# Patient Record
Sex: MALE | Race: BLACK OR AFRICAN AMERICAN | NOT HISPANIC OR LATINO | Employment: OTHER | URBAN - METROPOLITAN AREA
[De-identification: names, ages, dates, MRNs, and addresses within clinical notes are randomized per-mention and may not be internally consistent; named-entity substitution may affect disease eponyms.]

---

## 2017-01-04 NOTE — TELEPHONE ENCOUNTER
----- Message from Anna Bashir sent at 1/4/2017 11:47 AM CST -----  Contact: pt   Pt need a refill on test scripts.    ..[phone] (home) [phone] (work)  Pt states that once the refill in put in that the doctor would have to call this number [phone] so that they can override his payment.  ..  [facility] [id number] - [facility] [place] - [address] AT [facility]  [address] [place] [address]  Phone: [phone] Fax: [phone]

## 2017-01-05 NOTE — TELEPHONE ENCOUNTER
----- Message from Salazar Baron sent at 1/5/2017  1:58 PM CST -----  Contact: pt  He's calling in regards to a RX refill for diabetic strips, [facility] pharmacy in [place], pt states this his 2nd message and has not heard from anyone,  please advise, [phone] (home), please call pt when this has been sent to the pharmacy....

## 2017-01-05 NOTE — TELEPHONE ENCOUNTER
----- Message from Brynn Davila sent at 1/5/2017  8:38 AM CST -----  Contact[facility]  Would like to speak to nurse about rx for pt. Please call back at [phone]. Thanks//cdb

## 2017-01-06 DIAGNOSIS — E11.3299 TYPE 2 DIABETES MELLITUS WITH MILD NONPROLIFERATIVE RETINOPATHY, WITH LONG-TERM CURRENT USE OF INSULIN, MACULAR EDEMA PRESENCE UNSPECIFIED, UNSPECIFIED LATERALITY: ICD-10-CM

## 2017-01-06 DIAGNOSIS — K21.9 GASTROESOPHAGEAL REFLUX DISEASE, ESOPHAGITIS PRESENCE NOT SPECIFIED: ICD-10-CM

## 2017-01-06 DIAGNOSIS — Z79.4 TYPE 2 DIABETES MELLITUS WITH MILD NONPROLIFERATIVE RETINOPATHY, WITH LONG-TERM CURRENT USE OF INSULIN, MACULAR EDEMA PRESENCE UNSPECIFIED, UNSPECIFIED LATERALITY: ICD-10-CM

## 2017-01-06 RX ORDER — PANTOPRAZOLE SODIUM 40 MG/1
40 TABLET, DELAYED RELEASE ORAL DAILY
Qty: 30 TABLET | Refills: 11 | Status: SHIPPED | OUTPATIENT
Start: 2017-01-06 | End: 2017-01-30 | Stop reason: SDUPTHER

## 2017-01-06 RX ORDER — PEN NEEDLE, DIABETIC 30 GX3/16"
1 NEEDLE, DISPOSABLE MISCELLANEOUS 2 TIMES DAILY
Qty: 100 EACH | Refills: 3 | Status: SHIPPED | OUTPATIENT
Start: 2017-01-06 | End: 2017-01-30 | Stop reason: SDUPTHER

## 2017-01-06 RX ORDER — LISINOPRIL 5 MG/1
5 TABLET ORAL DAILY
Qty: 30 TABLET | Refills: 0 | Status: SHIPPED | OUTPATIENT
Start: 2017-01-06 | End: 2017-01-30 | Stop reason: SDUPTHER

## 2017-01-06 NOTE — TELEPHONE ENCOUNTER
----- Message from Katey Jones sent at 1/6/2017 10:25 AM CST -----  Contact: pt  Pt is requesting an rx refill for pin needles. Pt stated he does not have enough to last throughout the weekend. Pt also is requesting an rx refill for Pantoprazole 40mg and Lisinopril 5mg. Pt is requesting to be contacted when rx is sent tp pharmacy. Pt uses ..  [facility] [id number] - [place] - [address] AT [facility]  [address] [place] [address]  Phone: [phone] Fax: [phone]    Pls call pt back at [phone]

## 2017-01-07 RX ORDER — LISINOPRIL 5 MG/1
TABLET ORAL
Qty: 30 TABLET | Refills: 0 | OUTPATIENT
Start: 2017-01-07

## 2017-01-09 ENCOUNTER — TELEPHONE (OUTPATIENT)
Dept: INTERNAL MEDICINE | Facility: CLINIC | Age: 62
End: 2017-01-09

## 2017-01-09 NOTE — TELEPHONE ENCOUNTER
----- Message from Vimal Varela sent at 1/9/2017 12:50 PM CST -----  Contact:  Salena [facility] [phone] ext  [phone]  A request for diabetic strips has been sent from [facility] please contact at [phone] ext [phone].    Thanks  RS

## 2017-01-09 NOTE — TELEPHONE ENCOUNTER
Unable to leave voice message for Natalee with [facility] system full at this time.

## 2017-01-11 ENCOUNTER — TELEPHONE (OUTPATIENT)
Dept: INTERNAL MEDICINE | Facility: CLINIC | Age: 62
End: 2017-01-11

## 2017-01-11 NOTE — TELEPHONE ENCOUNTER
----- Message from Dary Martinez sent at 1/11/2017 12:41 PM CST -----  Contact: Salena/Anabelle Martino called and stated that the patient advised her that the nurse has been trying to get in touch with her. Please call her at [phone] ext [phone].    Thanks,  Dary

## 2017-01-11 NOTE — TELEPHONE ENCOUNTER
----- Message from Fabiola Mandujano sent at 1/11/2017 10:36 AM CST -----  Contact: [facility]  Pt needs to have a refill on Contour Next Test Strips. Pt uses..    [id number] - [place], [place] - [address] AT [address]  [address] [place] [address]  Phone: [phone] Fax: [phone]    [facility] can be reached at [phone] ext [phone].

## 2017-01-11 NOTE — TELEPHONE ENCOUNTER
Called Salena and informed that order for test strips was faxed to local pharm [facility] as requested. Cordelia transferred me to Nel who informed that they have not received old strips from pt. Pt states he sent them back on Friday 01/06/17. Nel states pt should check back within 1 week to ensure strips were received. Nel also informed that [facility] attempted to reach out to our office to have rx switched from [employer] Contour to preferred testing supplies and glucometer, True Matrix Air. Verbal order given to Claudia Pharmacist to dispense covered testing supplies once they receive incorrect strips from pt. Pt informed that order sent to [facility] pharm today to cover him until he receives new True Matrix Air supplies from [facility]. Pt verbalized understanding.

## 2017-01-11 NOTE — TELEPHONE ENCOUNTER
----- Message from Celine Jordan sent at 1/9/2017  2:49 PM CST -----  Contact: Jose/[facility] at home  Caller request call from nurse to get RX for Diabetes Strips for Contour Next...    [facility] [id number] - [facility] [place] - [address] AT [place] [facility]  [address] [place] [address]  Phone: [phone] Fax: [phone]

## 2017-01-12 ENCOUNTER — TELEPHONE (OUTPATIENT)
Dept: INTERNAL MEDICINE | Facility: CLINIC | Age: 62
End: 2017-01-12

## 2017-01-12 ENCOUNTER — OFFICE VISIT (OUTPATIENT)
Dept: SLEEP MEDICINE | Facility: CLINIC | Age: 62
End: 2017-01-12
Payer: MEDICARE

## 2017-01-12 ENCOUNTER — LAB VISIT (OUTPATIENT)
Dept: LAB | Facility: HOSPITAL | Age: 62
End: 2017-01-12
Attending: INTERNAL MEDICINE
Payer: MEDICARE

## 2017-01-12 VITALS
DIASTOLIC BLOOD PRESSURE: 72 MMHG | WEIGHT: 204.81 LBS | HEART RATE: 103 BPM | RESPIRATION RATE: 18 BRPM | HEIGHT: 65 IN | BODY MASS INDEX: 34.12 KG/M2 | SYSTOLIC BLOOD PRESSURE: 120 MMHG | OXYGEN SATURATION: 96 %

## 2017-01-12 DIAGNOSIS — G47.21 DELAYED SLEEP PHASE SYNDROME: ICD-10-CM

## 2017-01-12 DIAGNOSIS — G47.33 OBSTRUCTIVE SLEEP APNEA SYNDROME: Primary | ICD-10-CM

## 2017-01-12 DIAGNOSIS — E11.65 UNCONTROLLED TYPE 2 DIABETES MELLITUS WITH MILD NONPROLIFERATIVE RETINOPATHY WITHOUT MACULAR EDEMA, WITH LONG-TERM CURRENT USE OF INSULIN, UNSPECIFIED LATERALITY: ICD-10-CM

## 2017-01-12 DIAGNOSIS — E11.3299 UNCONTROLLED TYPE 2 DIABETES MELLITUS WITH MILD NONPROLIFERATIVE RETINOPATHY WITHOUT MACULAR EDEMA, WITH LONG-TERM CURRENT USE OF INSULIN, UNSPECIFIED LATERALITY: ICD-10-CM

## 2017-01-12 DIAGNOSIS — J45.909 MODERATE ASTHMA: ICD-10-CM

## 2017-01-12 DIAGNOSIS — I50.42 CHRONIC COMBINED SYSTOLIC AND DIASTOLIC CONGESTIVE HEART FAILURE: ICD-10-CM

## 2017-01-12 DIAGNOSIS — Z79.4 UNCONTROLLED TYPE 2 DIABETES MELLITUS WITH MILD NONPROLIFERATIVE RETINOPATHY WITHOUT MACULAR EDEMA, WITH LONG-TERM CURRENT USE OF INSULIN, UNSPECIFIED LATERALITY: ICD-10-CM

## 2017-01-12 LAB — FERRITIN SERPL-MCNC: 505 NG/ML

## 2017-01-12 PROCEDURE — 3046F HEMOGLOBIN A1C LEVEL >9.0%: CPT | Mod: S$GLB,,, | Performed by: INTERNAL MEDICINE

## 2017-01-12 PROCEDURE — 99999 PR PBB SHADOW E&M-EST. PATIENT-LVL IV: CPT | Mod: PBBFAC,,, | Performed by: INTERNAL MEDICINE

## 2017-01-12 PROCEDURE — 99205 OFFICE O/P NEW HI 60 MIN: CPT | Mod: S$GLB,,, | Performed by: INTERNAL MEDICINE

## 2017-01-12 PROCEDURE — 82728 ASSAY OF FERRITIN: CPT

## 2017-01-12 PROCEDURE — 4010F ACE/ARB THERAPY RXD/TAKEN: CPT | Mod: S$GLB,,, | Performed by: INTERNAL MEDICINE

## 2017-01-12 PROCEDURE — 36415 COLL VENOUS BLD VENIPUNCTURE: CPT | Mod: PO

## 2017-01-12 PROCEDURE — 3078F DIAST BP <80 MM HG: CPT | Mod: S$GLB,,, | Performed by: INTERNAL MEDICINE

## 2017-01-12 PROCEDURE — 1159F MED LIST DOCD IN RCRD: CPT | Mod: S$GLB,,, | Performed by: INTERNAL MEDICINE

## 2017-01-12 PROCEDURE — 3074F SYST BP LT 130 MM HG: CPT | Mod: S$GLB,,, | Performed by: INTERNAL MEDICINE

## 2017-01-12 NOTE — MR AVS SNAPSHOT
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, with long-term current use of insulin, unspecified laterality                To Do List           Future Appointments        Provider Department Dept Phone    2017 10:00 AM Selene Fishman Jr., RENAE [facility] [phone]    3/8/2017 10:00 AM FIELDS, VISUAL-ONE [facility] [phone]    3/8/2017 10:45 AM SOILA Dias OD [facility] [phone]    2017 1:00 PM EDUCATION, DIABETES [facility] [phone]    2017 9:00 AM Barb Veras DPM [facility] [phone]      Goals (5 Years of Data)     None      Follow-Up and Disposition     Return in about 6 months (around 2017) for Gahn with PSG and PFT, PDMC, ferritin today.      [facility] On [facility]     [facility] On Call Nurse Care Line -  Assistance  Registered nurses in the [facility] Center provide clinical advisement, health education, appointment booking, and other advisory services.  Call for this free service at [phone].             Medications                Verify that the below list of medications is an accurate representation of the medications you are currently taking.  If none reported, the list may be blank. If incorrect, please contact your healthcare provider. Carry this list with you in case of emergency.           Current  "Medications     albuterol 90 mcg/actuation inhaler Inhale 2 puffs into the lungs every 6 (six) hours as needed for Wheezing.    amlodipine (NORVASC) 10 MG tablet Take 1 tablet (10 mg total) by mouth once daily.    aspirin (ECOTRIN) 81 MG EC tablet Take 1 tablet (81 mg total) by mouth once daily.    atorvastatin (LIPITOR) 40 MG tablet Take 1 tablet (40 mg total) by mouth every evening.    blood sugar diagnostic Strp 1 strip by Misc.(Non-Drug; Combo Route) route 4 (four) times daily before meals and nightly.    blood-glucose meter (CONTOUR NEXT EZ METER) kit Use as instructed    gabapentin (NEURONTIN) 300 MG capsule Take 1 capsule (300 mg total) by mouth 3 (three) times daily.    insulin glargine (LANTUS SOLOSTAR) 100 unit/mL (3 mL) InPn pen Inject 50 Units into the skin every evening.    insulin lispro (HUMALOG KWIKPEN) 100 unit/mL InPn pen 20 units subcutaneously three times a day 10-15 min before meals    insulin syringe-needle U-100 1 mL 31 gauge x 5/16 Syrg     latanoprost 0.005 % ophthalmic solution Place 1 drop into both eyes every evening.    lisinopril (PRINIVIL,ZESTRIL) 5 MG tablet Take 1 tablet (5 mg total) by mouth once daily.    metformin (GLUCOPHAGE) 1000 MG tablet Take 1 tablet (1,000 mg total) by mouth 2 (two) times daily with meals.    metoprolol succinate (TOPROL-XL) 50 MG 24 hr tablet Take 1 tablet (50 mg total) by mouth once daily.    nitroGLYCERIN (NITROSTAT) 0.3 MG SL tablet Place 1 tablet (0.3 mg total) under the tongue every 5 (five) minutes as needed for Chest pain. No more than 3 tablets in one day.    pantoprazole (PROTONIX) 40 MG tablet Take 1 tablet (40 mg total) by mouth once daily.    pen needle, diabetic 31 gauge x 5/16" Ndle Inject 1 pen into the skin 2 (two) times daily. Use one 2 times a day    sertraline (ZOLOFT) 50 MG tablet Take 1 tablet (50 mg total) by mouth once daily.           Clinical Reference Information           Vital Signs - Last Recorded  Most recent update: 1/12/2017  " "2:51 PM by Arron Foster LPN    BP Pulse Resp Ht Wt SpO2    120/72 103 18 5' 5" (1.651 m) 92.9 kg (204 lb 12.9 oz) 96%    BMI                34.08 kg/m2          Blood Pressure          Most Recent Value    BP  120/72      Allergies as of 1/12/2017     No Known Allergies      Immunizations Administered on Date of Encounter - 1/12/2017     None      Orders Placed During Today's Visit      Normal Orders This Visit    Ambulatory Referral to Pulmonary Disease Management     Future Labs/Procedures Expected by Expires    FERRITIN  1/12/2017 3/13/2018    Complete PFT w/ bronchodilator  As directed 1/12/2018    Polysomnogram (CPAP will be added if patient meets diagnostic criteria.)  As directed 1/12/2018      Instructions    Your provider has scheduled you for a sleep study.   You should be receiving a phone call from the sleep lab shortly after your study has been approved by your insurance. Please make sure you have your current phone numbers in the [facility] system. If you do not hear from anyone in the next 10 business days, please call the sleep lab at [phone] to schedule your sleep study. The sleep studies are performed at [facility] seven nights a week.  When you are scheduling your sleep study, they will also make you a follow up appointment with your provider. This follow up appointment will be 10-14 days after your sleep study to review the results. If it is noted that you do have sleep apnea on your initial sleep study, you may receive a call back for a second night study with the CPAP before you come back to the office.     Return to review sleep study and start therapy.    Contact to sleep coordinator [phone] given     Discussion with patient concerning the diagnostic signs and symptoms of Obstructive Sleep Apnea and the risks of untreated/undiagnosed OSAS and the indications for a sleep study.   Discussed CPAP as primary therapy with adjunctive surgical interventions if " appropriate.     Discussed Mandibular Advancement Oral Appliances.     Risks of untreated OSAS discussed including Cardiovascular and Excessive Daytime Sleepiness and that especially with significant cardiovascular comorbities there is significantly increased risk of morbidity and mortality from heart attack, stroke, irregular heartbeat, sudden death from arrhythmia during sleep as well as increased risk of Motor vehicle accidents and other accidents due to excessive sleepiness.     Pt expresses understanding and will be scheduled for PSG with/without CPAP as appropriate.      Oriented to Sleep Disorders Center and video reviewed.     Follow up in several weeks once sleep studies are completed and ready to be reviewed.     Patient expresses understanding and agreement.         Oral appliance therapy discussed.Weight loss with diet and exercise may be beneficial for both OSAS and other comorbidities  May need to consider bariatric surgery.

## 2017-01-12 NOTE — TELEPHONE ENCOUNTER
----- Message from Katey Jones sent at 1/12/2017  8:52 AM CST -----  Contact: Anabelle/ Salena Martino is requesting a prior authorization for Contour NextTesting Strips. Salena can be reached at [phone] ext [phone]

## 2017-01-12 NOTE — TELEPHONE ENCOUNTER
----- Message from Brynn Davila sent at 1/12/2017  9:34 AM CST -----  Contact: [facility]  Would like to speak to nurse to verify order received for pt rx. Please call back at [phone]. Thanks//cdb
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
## 2017-01-12 NOTE — LETTER
January 12, 2017      YAYA Lam,FNP-C  [address] [address] [address]           [facility]  [address] [facility] [address]  Phone: [phone]          Patient: Crow Green   MR Number: [id number]   YOB: 1955   Date of Visit: 1/12/2017       Dear Lucy Martinez:    Thank you for referring Crow Green to me for evaluation. Attached you will find relevant portions of my assessment and plan of care.    If you have questions, please do not hesitate to call me. I look forward to following Crow Green along with you.    Sincerely,    Andre Vargas MD    Enclosure  CC:  No Recipients    If you would like to receive this communication electronically, please contact [email or web address] or [phone] to request more information on [employer] access.    For providers and/or their staff who would like to refer a patient to [facility], please contact us through our one-stop-shop provider referral line, [facility], at [phone].    If you feel you have received this communication in error or would no longer like to receive these types of communications, please e-mail [email or web address]

## 2017-01-12 NOTE — PATIENT INSTRUCTIONS
Your provider has scheduled you for a sleep study.   You should be receiving a phone call from the sleep lab shortly after your study has been approved by your insurance. Please make sure you have your current [facility]. If you do not hear from anyone in the next 10 business days, please call the sleep lab at [phone] to schedule your sleep study. The sleep studies are performed at [facility] seven nights a week.  When you are scheduling your sleep study, they will also make you a follow up appointment with your provider. This follow up appointment will be 10-14 days after your sleep study to review the results. If it is noted that you do have sleep apnea on your initial sleep study, you may receive a call back for a second night study with the CPAP before you come back to the office.     Return to review sleep study and start therapy.    Contact to sleep coordinator [phone] given     Discussion with patient concerning the diagnostic signs and symptoms of Obstructive Sleep Apnea and the risks of untreated/undiagnosed OSAS and the indications for a sleep study.   Discussed CPAP as primary therapy with adjunctive surgical interventions if appropriate.     Discussed Mandibular Advancement Oral Appliances.     Risks of untreated OSAS discussed including Cardiovascular and Excessive Daytime Sleepiness and that especially with significant cardiovascular comorbities there is significantly increased risk of morbidity and mortality from heart attack, stroke, irregular heartbeat, sudden death from arrhythmia during sleep as well as increased risk of Motor vehicle accidents and other accidents due to excessive sleepiness.     Pt expresses understanding and will be scheduled for PSG with/without CPAP as appropriate.      Oriented to [facility] and video reviewed.     Follow up in several weeks once sleep studies are completed and ready to be  reviewed.     Patient expresses understanding and agreement.         Oral appliance therapy discussed.Weight loss with diet and exercise may be beneficial for both OSAS and other comorbidities  May need to consider bariatric surgery.

## 2017-01-13 ENCOUNTER — TELEPHONE (OUTPATIENT)
Dept: PULMONOLOGY | Facility: CLINIC | Age: 62
End: 2017-01-13

## 2017-01-13 DIAGNOSIS — M54.9 BACK PAIN, UNSPECIFIED BACK LOCATION, UNSPECIFIED BACK PAIN LATERALITY, UNSPECIFIED CHRONICITY: Primary | ICD-10-CM

## 2017-01-13 DIAGNOSIS — R06.02 SOB (SHORTNESS OF BREATH): Primary | ICD-10-CM

## 2017-01-13 RX ORDER — NAPROXEN 500 MG/1
500 TABLET ORAL 2 TIMES DAILY
Qty: 14 TABLET | Refills: 0 | Status: SHIPPED | OUTPATIENT
Start: 2017-01-13 | End: 2017-01-30 | Stop reason: SDUPTHER

## 2017-01-13 RX ORDER — BLOOD SUGAR DIAGNOSTIC
STRIP MISCELLANEOUS
Qty: 100 STRIP | Refills: 0 | OUTPATIENT
Start: 2017-01-13

## 2017-01-18 ENCOUNTER — OFFICE VISIT (OUTPATIENT)
Dept: DIABETES | Facility: CLINIC | Age: 62
End: 2017-01-18
Payer: MEDICARE

## 2017-01-18 VITALS
DIASTOLIC BLOOD PRESSURE: 80 MMHG | HEIGHT: 65 IN | BODY MASS INDEX: 34.53 KG/M2 | SYSTOLIC BLOOD PRESSURE: 146 MMHG | WEIGHT: 207.25 LBS

## 2017-01-18 LAB — GLUCOSE SERPL-MCNC: 198 MG/DL (ref 70–110)

## 2017-01-18 PROCEDURE — 99214 OFFICE O/P EST MOD 30 MIN: CPT | Mod: S$GLB,,, | Performed by: PHYSICIAN ASSISTANT

## 2017-01-18 PROCEDURE — 99999 PR PBB SHADOW E&M-EST. PATIENT-LVL III: CPT | Mod: PBBFAC,,, | Performed by: PHYSICIAN ASSISTANT

## 2017-01-18 PROCEDURE — 3079F DIAST BP 80-89 MM HG: CPT | Mod: S$GLB,,, | Performed by: PHYSICIAN ASSISTANT

## 2017-01-18 PROCEDURE — 1159F MED LIST DOCD IN RCRD: CPT | Mod: S$GLB,,, | Performed by: PHYSICIAN ASSISTANT

## 2017-01-18 PROCEDURE — 3046F HEMOGLOBIN A1C LEVEL >9.0%: CPT | Mod: S$GLB,,, | Performed by: PHYSICIAN ASSISTANT

## 2017-01-18 PROCEDURE — 82948 REAGENT STRIP/BLOOD GLUCOSE: CPT | Mod: S$GLB,,, | Performed by: PHYSICIAN ASSISTANT

## 2017-01-18 PROCEDURE — 4010F ACE/ARB THERAPY RXD/TAKEN: CPT | Mod: S$GLB,,, | Performed by: PHYSICIAN ASSISTANT

## 2017-01-18 PROCEDURE — 3077F SYST BP >= 140 MM HG: CPT | Mod: S$GLB,,, | Performed by: PHYSICIAN ASSISTANT

## 2017-01-18 NOTE — PROGRESS NOTES
Subjective:      Patient ID: Crow Green is a 61 y.o. male.    PCP: Mateo Lambert DO      Crow Green is a pleasant 61 y.o. male presenting to follow up on diabetes mellitus. He has had diabetes for 17 or more years. His last visit in Diabetes Management was 12/7/2016. Since that time he has had mild improvement in his symptoms. His blood sugar range fasting has been 130-140 and 2 hour post meal has been 140-160, and he has been monitoring 3-5 times per day as directed. His current concerns are Glycemic control and needing his strips to check is SMBG.    He denies any hospital admissions, emergency room visits, hypoglycemia, syncope, diaphoresis, chest pain, or dyspnea.    He has gained 3 pounds since last visit. His BMI is 34.49  His blood sugar in the clinic today was:   Lab Results   Component Value Date    POCGLU 198 (A) 01/18/2017       We discussed the American diabetes Association recommendations:  hemoglobin A1c below 7.0%; all diabetics should be on statins unless contraindicated; one aspirin daily unless contraindicated; fasting blood sugar between 80 and 130 mg/dL; postprandial blood sugar below 180 mg/dl; prevention of hypoglycemia, may adjust goals to higher levels if persistent; ACE or ARB therapy if not contraindicated; and maintain in an ideal body weight with BMI below 25.    STANDARDS OF CARE:  Eye doctor: Dr. Sims, last exam Last year.  Dental exam: Recommend regular exams; denies gums bleeding.  Podiatry doctor:    ACTIVITY LEVEL: He exercises rarely.  MEAL PLANNING: Number of meals per day - 3. Number of snacks per day - 2.  Per dietary recall, patient is not limiting carbohydrates, saturated fats and sodium.   BLOOD GLUCOSE TESTING: Self-monitoring with   SOCIAL HISTORY: . Lives with spouse. retired no tobacco use    The following results were reviewed with patient.    Lab Results   Component Value Date    WBC 10.06 11/16/2016    HGB 14.0 11/16/2016    HCT 41.5  11/16/2016     11/16/2016    CHOL 189 11/16/2016    TRIG 134 11/16/2016    HDL 42 11/16/2016    ALT 35 11/16/2016    AST 50 (H) 11/16/2016     11/16/2016    K 4.1 11/16/2016     11/16/2016    CREATININE 1.0 11/16/2016    ESTGFRAFRICA >60 11/16/2016    EGFRNONAA >60 11/16/2016    BUN 14 11/16/2016    CO2 25 11/16/2016    TSH 1.345 12/07/2016    PSA 0.83 01/18/2013    INR 1.0 11/16/2016     (H) 11/16/2016       Lab Results   Component Value Date    HGBA1C 11.7 (H) 12/07/2016    HGBA1C 12.9 (H) 11/16/2016    HGBA1C 13.0 (H) 11/15/2016       Lab Results   Component Value Date    GLUTAMICACID 0.00 12/07/2016    CPEPTIDE 5.1 12/07/2016     Lab Results   Component Value Date    TSH 1.345 12/07/2016     Lab Results   Component Value Date    TOTALTESTOST 182 (L) 01/18/2013     Lab Results   Component Value Date    TESTOSTERONE 213 (L) 12/04/2014    TESTOSTERONE 35.9 (L) 12/04/2014     Lab Results   Component Value Date    FERRITIN 505 (H) 01/12/2017     Lab Results   Component Value Date    PTH 95.0 (H) 12/04/2014    CALCIUM 8.9 11/16/2016    PHOS 2.6 (L) 11/16/2016       Review of patient's allergies indicates:  No Known Allergies    Past Medical History   Diagnosis Date    Arthritis     Asthma     Depression     Diabetes mellitus     Diabetes mellitus, type 2 2000    Elevated PSA     Hypertension        Review of Systems   Constitutional: Negative.  Negative for activity change, appetite change, chills, diaphoresis, fatigue, fever and unexpected weight change.   HENT: Negative.  Negative for dental problem, facial swelling, hearing loss, nosebleeds, trouble swallowing and voice change.    Eyes: Negative.  Negative for photophobia, pain, discharge, redness, itching and visual disturbance.   Respiratory: Negative.  Negative for cough, choking, chest tightness, shortness of breath and wheezing.    Cardiovascular: Negative.  Negative for chest pain, palpitations and leg swelling.  "  Gastrointestinal: Negative.  Negative for abdominal distention, abdominal pain, blood in stool, constipation, diarrhea, nausea and vomiting.   Endocrine: Negative.  Negative for cold intolerance, heat intolerance, polydipsia, polyphagia and polyuria.   Genitourinary: Negative.  Negative for decreased urine volume, difficulty urinating, dysuria, frequency, scrotal swelling, testicular pain and urgency.   Musculoskeletal: Negative.  Negative for arthralgias, back pain, gait problem, joint swelling, myalgias, neck pain and neck stiffness.   Skin: Negative.  Negative for color change, pallor, rash and wound.   Allergic/Immunologic: Negative.  Negative for environmental allergies, food allergies and immunocompromised state.   Neurological: Negative.  Negative for dizziness, tremors, seizures, syncope, facial asymmetry, speech difficulty, weakness, light-headedness, numbness and headaches.   Hematological: Negative.  Negative for adenopathy. Does not bruise/bleed easily.   Psychiatric/Behavioral: Negative.  Negative for agitation, behavioral problems, confusion, decreased concentration, dysphoric mood, hallucinations, self-injury, sleep disturbance and suicidal ideas. The patient is not nervous/anxious and is not hyperactive.       Objective:     Vitals - 1 value per visit 12/30/2016 1/12/2017 1/18/2017   SYSTOLIC 146 120 146   DIASTOLIC 84 72 80   PULSE 100 103 -   TEMPERATURE 97.9 - -   RESPIRATIONS 20 18 -   SPO2 98 96 -   Weight (lb) 200.62 204.81 207.23   HEIGHT 5' 6" 5' 5" 5' 5"   BODY MASS INDEX 32.38 34.08 34.49   VISIT REPORT - - -   Pain Score  8 6 -       Physical Exam   Constitutional: He is oriented to person, place, and time. He appears well-developed and well-nourished. He is cooperative.  Non-toxic appearance. He does not have a sickly appearance. He does not appear ill. No distress. He is not intubated.   HENT:   Head: Normocephalic and atraumatic. Not macrocephalic and not microcephalic. Head is " without raccoon's eyes, without Summers's sign, without abrasion, without contusion, without laceration, without right periorbital erythema and without left periorbital erythema. Hair is normal.   Right Ear: External ear normal. No lacerations. No drainage, swelling or tenderness. No foreign bodies. No mastoid tenderness. Tympanic membrane is not injected, not scarred, not perforated, not erythematous, not retracted and not bulging. Tympanic membrane mobility is normal. No middle ear effusion. No hemotympanum. No decreased hearing is noted.   Left Ear: External ear normal. No lacerations. No drainage, swelling or tenderness. No foreign bodies. No mastoid tenderness. Tympanic membrane is not injected, not scarred, not perforated, not erythematous, not retracted and not bulging. Tympanic membrane mobility is normal.  No middle ear effusion. No hemotympanum. No decreased hearing is noted.   Nose: Nose normal.   Mouth/Throat: Oropharynx is clear and moist.   Eyes: EOM and lids are normal. Pupils are equal, round, and reactive to light. Right eye exhibits no chemosis, no discharge, no exudate and no hordeolum. No foreign body present in the right eye. Left eye exhibits no chemosis, no discharge, no exudate and no hordeolum. No foreign body present in the left eye. Right conjunctiva is not injected. Right conjunctiva has no hemorrhage. Left conjunctiva is not injected. Left conjunctiva has no hemorrhage. No scleral icterus. Right eye exhibits normal extraocular motion and no nystagmus. Left eye exhibits normal extraocular motion and no nystagmus. Right pupil is round and reactive. Left pupil is round and reactive. Pupils are equal.   Neck: Normal range of motion, full passive range of motion without pain and phonation normal. Neck supple. Normal carotid pulses, no hepatojugular reflux and no JVD present. No tracheal tenderness, no spinous process tenderness and no muscular tenderness present. Carotid bruit is not present.  No rigidity. No tracheal deviation, no edema, no erythema and normal range of motion present. No thyroid mass and no thyromegaly present.   Cardiovascular: Normal rate, regular rhythm, normal heart sounds and intact distal pulses.   No extrasystoles are present. PMI is not displaced.  Exam reveals no gallop, no friction rub and no decreased pulses.    No murmur heard.  Pulses:       Carotid pulses are 2+ on the right side, and 2+ on the left side.       Radial pulses are 2+ on the right side, and 2+ on the left side.        Dorsalis pedis pulses are 2+ on the right side, and 2+ on the left side.        Posterior tibial pulses are 2+ on the right side, and 2+ on the left side.   Pulmonary/Chest: Effort normal and breath sounds normal. No accessory muscle usage or stridor. No apnea, no tachypnea and no bradypnea. He is not intubated. No respiratory distress. He has no decreased breath sounds. He has no wheezes. He has no rhonchi. He has no rales. He exhibits no tenderness.   Abdominal: Soft. Bowel sounds are normal. He exhibits no shifting dullness, no distension, no pulsatile liver, no fluid wave, no abdominal bruit, no ascites, no pulsatile midline mass and no mass. There is no hepatosplenomegaly, splenomegaly or hepatomegaly. There is no tenderness. There is no rigidity, no rebound, no guarding, no CVA tenderness and no tenderness at McBurney's point. No hernia. Hernia confirmed negative in the ventral area.   Musculoskeletal: Normal range of motion. He exhibits no edema or tenderness.        Right foot: There is normal range of motion and no deformity.        Left foot: There is normal range of motion and no deformity.   Feet:   Right Foot:   Protective Sensation: 6 sites tested. 6 sites sensed.   Skin Integrity: Negative for ulcer, blister, skin breakdown, erythema, warmth, callus or dry skin.   Left Foot:   Protective Sensation: 6 sites tested. 6 sites sensed.   Skin Integrity: Negative for ulcer, blister, skin  breakdown, erythema, warmth, callus or dry skin.   Lymphadenopathy:        Head (right side): No submental, no submandibular, no tonsillar, no preauricular, no posterior auricular and no occipital adenopathy present.        Head (left side): No submental, no submandibular, no tonsillar, no preauricular, no posterior auricular and no occipital adenopathy present.     He has no cervical adenopathy.        Right cervical: No superficial cervical, no deep cervical and no posterior cervical adenopathy present.       Left cervical: No superficial cervical, no deep cervical and no posterior cervical adenopathy present.   Neurological: He is alert and oriented to person, place, and time. He has normal reflexes. He displays no atrophy and no tremor. No cranial nerve deficit or sensory deficit. He exhibits normal muscle tone. He displays no seizure activity. Coordination and gait normal.   Reflex Scores:       Bicep reflexes are 2+ on the right side and 2+ on the left side.       Brachioradialis reflexes are 2+ on the right side and 2+ on the left side.       Patellar reflexes are 2+ on the right side and 2+ on the left side.  Skin: Skin is warm and dry. No rash noted. No erythema. No pallor.   Psychiatric: He has a normal mood and affect. His mood appears not anxious. His affect is not angry, not blunt, not labile and not inappropriate. His speech is not rapid and/or pressured, not delayed, not tangential and not slurred. He is not agitated, not aggressive, not hyperactive, not slowed, not withdrawn, not actively hallucinating and not combative. Thought content is not paranoid and not delusional. Cognition and memory are not impaired. He does not express impulsivity or inappropriate judgment. He does not exhibit a depressed mood. He expresses no homicidal and no suicidal ideation. He expresses no suicidal plans and no homicidal plans. He is communicative. He exhibits normal recent memory and normal remote memory. He is  attentive.     Assessment:     1. 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 He has elected the following treatments.     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 Reminded to bring BG meter or record to each visit for review.  2.) Reviewed pathophysiology of type 2 diabetes, complications related to the disease, importance of annual dilated eye exam and self daily foot examination.  3.) Continue medications as prescribed MDI with Lantus and Humalog. [facility] or Phone review in 1 week with BG records for adjustment of medication.  4.) Reviewed carb counting, portion control, importance of spacing meals throughout the day to prevent post prandial elevations. Recommended low saturated fat, low sodium diet to aid in control of hypertension and cholesterol.  5.) Discussed activity, benefits, methods, and precautions. Recommended patient start/continue some form of exercise and increase as tolerated to 60 minutes per day to facilitate weight loss and aid in control of BGs. Also reminded patient of WHO recommendation of 10,000 steps daily as a goal.   6.) A1C, TSH, Lipid  Panel, CMP with eGFR and Micro/Creatinine per ADA protocol.  7.) Return to clinic in 3 months for follow up. Advised patient to call clinic with any questions or concerns.    A total of 30 minutes was spent in face to face time, of which 50 % was spent in counseling patient on disease process, complications, treatment, and side effects of medications.    The patient was explained the above plan and given opportunity to ask questions.  He understands, chooses and consents to this plan and accepts all the risks, which include but are not limited to the risks mentioned above.   He understands the alternative of having no testing, interventions or treatments at this time. He left content and without further questions.

## 2017-01-18 NOTE — PATIENT INSTRUCTIONS
"I have reviewed your results and they are still quite high. I would like to adjust your "Treating to Target". The treatment will be Insulin and your target will be the Fasting and 2 hour post meal blood sugar. It will work in this manner.     1. Goal for Fasting blood sugar is  mg/dl. I realize that you will need time to adjust to the new levels and presently you may feel too low if you are too aggressive now. So go slow and aim to lower your blood sugar to below 200 then 150 then 100 over several months.     2. Goal for 2 hour post meal blood sugar is below 180 mg/dl, here the same rules apply as in #1.     3. You will check your fasting blood sugar daily, if not where we would like it to be over a 3 day period then that evening we will increase the Lantus dose by 5 units. Then repeat the process over the next 3 days. Remember this is a slow process and take our time getting to goal. But, each week should be better than prior weeks. Blood sugars below 70 are unacceptable and should raise a "RED FLAG" where we may have to reduce our dose of insulin.     4. You will check your post meal glucose daily as well. However, each day you will check a different meal, (ie. Monday-breakfast; Tuesday- lunch; Wednesday- supper, then repeat). If your post meal glucose is not where we would like, increase pre-meal insulin by 2 units next time. A word of CAUTION: mealtime insulin is dependant on the size and concentration of your meal content. If not consuming a large meal do not take large dose of insulin. Use the reasonable person rule.      5. If you have any questions please do not hesitate to call.     Intensive insulin Therapy with correction factor:     You are on Intensive insulin therapy with Basal and Bolus insulin. Lantus, is your Basal insulin and will help maintain you Fasting and between meal sugar. Your fast acting or rescue insulin is Humalog insulin and will control your post meal sugar.      You will Take 25 " "units of Lantus at 9 pm each night. This will be adjusted up or down depending on your fasting blood sugar before breakfast.   Sujata starks justify is  Humalog will follow this pre meal schedule; Correction factor of "2 units per 50 mg/dl" and is based on 45-60 grams of carbohydrates in each meal, and 1800 k/quincy per day.      If blood sugar is below 70 eat first then check your blood sugar 2 hours later and make correction.  If blood pre-meal sugar is 70 -150 take 8 units of Humalog;  If blood pre-meal sugar is 151-200 Add +2 units of Humalog;  If blood pre-meal sugar is 201-250 Add +4 units of Humalog;  If blood pre-meal sugar is 251-300 Add +6 units of Humalog;  If blood pre-meal sugar is 301-350 Add +8 units of Humalog;  If blood pre-meal sugar is 351-400+ Add +10 units of Humalog;  Also increase water intake and call for appointment.  "

## 2017-01-20 ENCOUNTER — HOSPITAL ENCOUNTER (OUTPATIENT)
Dept: SLEEP MEDICINE | Facility: HOSPITAL | Age: 62
Discharge: HOME | End: 2017-01-20
Attending: INTERNAL MEDICINE
Payer: MEDICARE

## 2017-01-20 DIAGNOSIS — F51.04 PSYCHOPHYSIOLOGICAL INSOMNIA: ICD-10-CM

## 2017-01-20 DIAGNOSIS — F51.5 NIGHTMARES: ICD-10-CM

## 2017-01-20 DIAGNOSIS — G47.33 OBSTRUCTIVE SLEEP APNEA SYNDROME: Primary | ICD-10-CM

## 2017-01-20 DIAGNOSIS — K21.9 SLEEP RELATED GASTROESOPHAGEAL REFLUX DISEASE: ICD-10-CM

## 2017-01-20 DIAGNOSIS — Z72.821 INADEQUATE SLEEP HYGIENE: ICD-10-CM

## 2017-01-20 PROCEDURE — 95810 POLYSOM 6/> YRS 4/> PARAM: CPT | Mod: 26,,, | Performed by: PSYCHOLOGIST

## 2017-01-20 PROCEDURE — 95810 POLYSOM 6/> YRS 4/> PARAM: CPT

## 2017-01-24 ENCOUNTER — TELEPHONE (OUTPATIENT)
Dept: INTERNAL MEDICINE | Facility: CLINIC | Age: 62
End: 2017-01-24

## 2017-01-24 DIAGNOSIS — G47.33 OBSTRUCTIVE SLEEP APNEA SYNDROME: Primary | ICD-10-CM

## 2017-01-24 NOTE — TELEPHONE ENCOUNTER
----- Message from Keisha Biggs sent at 1/23/2017  4:11 PM CST -----  Contact: pt  Pt states needs a refill on Naproxen..[phone] (please notify when sent)        .  [id number] - [facility] [place] - [address] AT [place] & [facility]  [address] [place] [address]  Phone: [phone] Fax: [phone]

## 2017-01-25 ENCOUNTER — TELEPHONE (OUTPATIENT)
Dept: INTERNAL MEDICINE | Facility: CLINIC | Age: 62
End: 2017-01-25

## 2017-01-25 NOTE — TELEPHONE ENCOUNTER
Nevaeh with [facility] states they received a request from [employer] for Lidocaine Oint. Patient contacted [facility] for a back brace, who then asked pt if he wanted Lidocaine Oint. I called pt to verify and he states he requested Oint for knee and hip pain. [facility] to fax request for Lidocaine.

## 2017-01-25 NOTE — TELEPHONE ENCOUNTER
----- Message from Silvio Rendon sent at 1/24/2017  9:39 AM CST -----  Contact: [facility] ( [facility] )   [facility] ( [facility] ) is requesting a variable order for lanacane.              Please call [facility] ( [facility] ) back at  [phone]

## 2017-01-26 ENCOUNTER — PROCEDURE VISIT (OUTPATIENT)
Dept: PULMONOLOGY | Facility: CLINIC | Age: 62
End: 2017-01-26
Payer: MEDICARE

## 2017-01-26 VITALS — HEIGHT: 65 IN | HEART RATE: 76 BPM | WEIGHT: 207.25 LBS | BODY MASS INDEX: 34.53 KG/M2 | OXYGEN SATURATION: 98 %

## 2017-01-26 DIAGNOSIS — J45.909 ASTHMA: ICD-10-CM

## 2017-01-26 DIAGNOSIS — J45.909 ASTHMA: Primary | ICD-10-CM

## 2017-01-26 NOTE — PROGRESS NOTES
Pulmonary Disease Management  [facility]  Initial Visit    Referring Provider: Dr. Vargas  Diagnosis: Asthma  Current Respiratory Medications:      Current Outpatient Prescriptions:     albuterol 90 mcg/actuation inhaler, Inhale 2 puffs into the lungs every 6 (six) hours as needed for Wheezing., Disp: 18 g, Rfl: 0    amlodipine (NORVASC) 10 MG tablet, Take 1 tablet (10 mg total) by mouth once daily., Disp: 90 tablet, Rfl: 3    aspirin (ECOTRIN) 81 MG EC tablet, Take 1 tablet (81 mg total) by mouth once daily., Disp: , Rfl: 0    atorvastatin (LIPITOR) 40 MG tablet, Take 1 tablet (40 mg total) by mouth every evening., Disp: 90 tablet, Rfl: 3    blood sugar diagnostic Strp, 1 strip by Misc.(Non-Drug; Combo Route) route 6 (six) times daily., Disp: 160 strip, Rfl: 11    blood-glucose meter (CONTOUR NEXT EZ METER) kit, Use as instructed, Disp: 1 each, Rfl: 0    gabapentin (NEURONTIN) 300 MG capsule, Take 1 capsule (300 mg total) by mouth 3 (three) times daily., Disp: 270 capsule, Rfl: 3    insulin glargine (LANTUS SOLOSTAR) 100 unit/mL (3 mL) InPn pen, Inject 50 Units into the skin every evening., Disp: 2 Box, Rfl: 11    insulin lispro (HUMALOG KWIKPEN) 100 unit/mL InPn pen, 20 units subcutaneously three times a day 10-15 min before meals (Patient taking differently: 10 units subcutaneously twice times a day 10-15 min before meals), Disp: 1 Box, Rfl: 11    insulin syringe-needle U-100 1 mL 31 gauge x 5/16 Syrg, , Disp: , Rfl:     latanoprost 0.005 % ophthalmic solution, Place 1 drop into both eyes every evening., Disp: 1 Bottle, Rfl: 4    lisinopril (PRINIVIL,ZESTRIL) 5 MG tablet, Take 1 tablet (5 mg total) by mouth once daily., Disp: 30 tablet, Rfl: 0    metformin (GLUCOPHAGE) 1000 MG tablet, Take 1 tablet (1,000 mg total) by mouth 2 (two) times daily with meals., Disp: 180 tablet, Rfl: 3    metoprolol succinate (TOPROL-XL) 50 MG 24 hr tablet, Take 1 tablet (50 mg total) by mouth once daily.,  "Disp: 30 tablet, Rfl: 0    nitroGLYCERIN (NITROSTAT) 0.3 MG SL tablet, Place 1 tablet (0.3 mg total) under the tongue every 5 (five) minutes as needed for Chest pain. No more than 3 tablets in one day., Disp: 30 tablet, Rfl: 1    pantoprazole (PROTONIX) 40 MG tablet, Take 1 tablet (40 mg total) by mouth once daily., Disp: 30 tablet, Rfl: 11    pen needle, diabetic 31 gauge x 5/16" Ndle, Inject 1 pen into the skin 2 (two) times daily. Use one 2 times a day, Disp: 100 each, Rfl: 3    sertraline (ZOLOFT) 50 MG tablet, Take 1 tablet (50 mg total) by mouth once daily., Disp: 90 tablet, Rfl: 3    Allergies: Review of patient's allergies indicates:  No Known Allergies    Smoking history:   History   Smoking Status    Never Smoker   Smokeless Tobacco    Not on file       SpO2: 98 %  Pulse: 76  Resp. Rate: 20 PER MINUTE  BMI (kg/m2): 34.56  All Lung Fields Breath Sounds: coarse  Cough Type: nonproductive, dry  Cough Frequency: infrequent             Resting Bridgette Dyspnea Rating : 5-6     Current Oxygen Use: No                   ACT Questionnaire:  Asthma Control Test  In the past 4  weeks, how much of the time did your asthma keep you from getting as much done at work, school or at home?: All of the time  During the past 4 weeks, how often have you had shortness of breath?: More than once a day  During the past 4 weeks, how often did your asthma symptoms (wheezing, couging, shortness of breath, chest tightness or pain) wake you up at night or earlier than usual in the morning?: 2 or 3 nights a week  During the past 4 weeks, how often have you used your rescue inhaler or nebulizer medication (such as albuterol)?: 1 or 2 times per day  How would you rate your asthma control during the past 4 weeks?: Poorly controlled  If your score is 19 or less, your asthma may not be under control: 8    [place] Sleepiness Scale:  [place] Sleepiness Scale  Sitting and reading: High chance of dozing  Watching TV: High chance of " dozing  Sitting, inactive in a public place (e.g. a theatre or a meeting): High chance of dozing  As a passenger in a car for an hour without a break: High chance of dozing  Lying down to rest in the afternoon when circumstances permit: Moderate chance of dozing  Sitting and talking to someone: High chance of dozing  Sitting quietly after a lunch without alcohol: Moderate chance of dozing  In a car, while stopped for a few minutes in traffic: High chance of dozing  Total score: 22    PFT Results:  No results found for: PREFVC, PREFEV1, PREFEV1, [id number], PRETLC, PREDLCO      Best Peak Flow: 430    Green/Yellow Zone: (Best Peakflow * 80 %) 360-450  Yellow/Red Zone: (Best Peakflow * 50%) 225-360      Is this patient a candidate for pulmonary rehab?: No  Method Used for Education: Literature, Demonstration, Verbal  Did the patient demonstrate understanding?: No  What tests has the patient had done today?: Spirometry     Patient Concerns or Expectations: Patient expressed concerns about medications. Patient states he is out of his medication.    Therapist Comments: Mr. Us presented today with compliant's of SOB. He denies having a productive cough, wheezing, and chest tightness. He states he is not using his medications daily. He states he has used his inhalers 5 times in the last week. Patient currently using Advair, Spiriva, and Ventolin. The Advair and Spiriva is not listed in his medical record. Patient states his PCP ordered his medication. Patient was advised to use his Advair BID and Spiriva once daily. Patient advised to contact his primary for refills on his medications. I will also contact Dr. Vargas to discuss his medications. Patient was given  literature on Asthma triggers check list, Controlling Asthma triggers, Peak flow, Peak flow chart sheet, and instruction on using spacer with MDI. Patient was given and instructed on how to use a peak flow device. Patient was shown how to track peak flow  on chart and advised to use twice a day and track. Patient dose not have access to my chart he will need to be called for questionnaire.

## 2017-01-30 ENCOUNTER — TELEPHONE (OUTPATIENT)
Dept: PULMONOLOGY | Facility: CLINIC | Age: 62
End: 2017-01-30

## 2017-01-30 ENCOUNTER — HOSPITAL ENCOUNTER (OUTPATIENT)
Dept: SLEEP MEDICINE | Facility: HOSPITAL | Age: 62
Discharge: HOME | End: 2017-01-30
Attending: INTERNAL MEDICINE
Payer: MEDICARE

## 2017-01-30 DIAGNOSIS — E11.69 HYPERLIPIDEMIA ASSOCIATED WITH TYPE 2 DIABETES MELLITUS: ICD-10-CM

## 2017-01-30 DIAGNOSIS — E11.3299 TYPE 2 DIABETES MELLITUS WITH MILD NONPROLIFERATIVE RETINOPATHY, WITH LONG-TERM CURRENT USE OF INSULIN, MACULAR EDEMA PRESENCE UNSPECIFIED, UNSPECIFIED LATERALITY: ICD-10-CM

## 2017-01-30 DIAGNOSIS — E78.5 HYPERLIPIDEMIA ASSOCIATED WITH TYPE 2 DIABETES MELLITUS: ICD-10-CM

## 2017-01-30 DIAGNOSIS — F32.A DEPRESSION, UNSPECIFIED DEPRESSION TYPE: ICD-10-CM

## 2017-01-30 DIAGNOSIS — E11.42 DIABETIC PERIPHERAL NEUROPATHY ASSOCIATED WITH TYPE 2 DIABETES MELLITUS: ICD-10-CM

## 2017-01-30 DIAGNOSIS — M54.9 BACK PAIN, UNSPECIFIED BACK LOCATION, UNSPECIFIED BACK PAIN LATERALITY, UNSPECIFIED CHRONICITY: ICD-10-CM

## 2017-01-30 DIAGNOSIS — K21.9 GASTROESOPHAGEAL REFLUX DISEASE, ESOPHAGITIS PRESENCE NOT SPECIFIED: ICD-10-CM

## 2017-01-30 DIAGNOSIS — E11.3299 UNCONTROLLED TYPE 2 DIABETES MELLITUS WITH MILD NONPROLIFERATIVE RETINOPATHY WITHOUT MACULAR EDEMA, WITH LONG-TERM CURRENT USE OF INSULIN, UNSPECIFIED LATERALITY: ICD-10-CM

## 2017-01-30 DIAGNOSIS — E11.65 UNCONTROLLED TYPE 2 DIABETES MELLITUS WITH MILD NONPROLIFERATIVE RETINOPATHY WITHOUT MACULAR EDEMA, WITH LONG-TERM CURRENT USE OF INSULIN, UNSPECIFIED LATERALITY: ICD-10-CM

## 2017-01-30 DIAGNOSIS — Z79.4 TYPE 2 DIABETES MELLITUS WITH MILD NONPROLIFERATIVE RETINOPATHY, WITH LONG-TERM CURRENT USE OF INSULIN, MACULAR EDEMA PRESENCE UNSPECIFIED, UNSPECIFIED LATERALITY: ICD-10-CM

## 2017-01-30 DIAGNOSIS — Z79.4 UNCONTROLLED TYPE 2 DIABETES MELLITUS WITH MILD NONPROLIFERATIVE RETINOPATHY WITHOUT MACULAR EDEMA, WITH LONG-TERM CURRENT USE OF INSULIN, UNSPECIFIED LATERALITY: ICD-10-CM

## 2017-01-30 DIAGNOSIS — I15.2 HYPERTENSION ASSOCIATED WITH DIABETES: ICD-10-CM

## 2017-01-30 DIAGNOSIS — E11.59 HYPERTENSION ASSOCIATED WITH DIABETES: ICD-10-CM

## 2017-01-30 DIAGNOSIS — G47.33 OBSTRUCTIVE SLEEP APNEA SYNDROME: ICD-10-CM

## 2017-01-30 PROCEDURE — 95811 POLYSOM 6/>YRS CPAP 4/> PARM: CPT

## 2017-01-30 PROCEDURE — 95811 POLYSOM 6/>YRS CPAP 4/> PARM: CPT | Mod: 26,,, | Performed by: INTERNAL MEDICINE

## 2017-01-30 RX ORDER — GABAPENTIN 300 MG/1
300 CAPSULE ORAL 3 TIMES DAILY
Qty: 270 CAPSULE | Refills: 3 | Status: SHIPPED | OUTPATIENT
Start: 2017-01-30 | End: 2018-04-02

## 2017-01-30 RX ORDER — METFORMIN HYDROCHLORIDE 1000 MG/1
1000 TABLET ORAL 2 TIMES DAILY WITH MEALS
Qty: 180 TABLET | Refills: 3 | Status: SHIPPED | OUTPATIENT
Start: 2017-01-30 | End: 2018-09-13 | Stop reason: SDUPTHER

## 2017-01-30 RX ORDER — PANTOPRAZOLE SODIUM 40 MG/1
40 TABLET, DELAYED RELEASE ORAL DAILY
Qty: 30 TABLET | Refills: 11 | Status: SHIPPED | OUTPATIENT
Start: 2017-01-30 | End: 2018-03-29 | Stop reason: SDUPTHER

## 2017-01-30 RX ORDER — LATANOPROST 50 UG/ML
1 SOLUTION/ DROPS OPHTHALMIC NIGHTLY
Qty: 1 BOTTLE | Refills: 4 | OUTPATIENT
Start: 2017-01-30 | End: 2018-01-30

## 2017-01-30 RX ORDER — INSULIN LISPRO 100 [IU]/ML
INJECTION, SOLUTION INTRAVENOUS; SUBCUTANEOUS
Qty: 1 BOX | Refills: 11
Start: 2017-01-30 | End: 2017-05-16

## 2017-01-30 RX ORDER — METOPROLOL SUCCINATE 50 MG/1
50 TABLET, EXTENDED RELEASE ORAL DAILY
Qty: 30 TABLET | Refills: 0 | Status: SHIPPED | OUTPATIENT
Start: 2017-01-30 | End: 2017-02-20 | Stop reason: SDUPTHER

## 2017-01-30 RX ORDER — INSULIN GLARGINE 100 [IU]/ML
50 INJECTION, SOLUTION SUBCUTANEOUS NIGHTLY
Qty: 2 BOX | Refills: 11
Start: 2017-01-30 | End: 2019-03-20 | Stop reason: SDUPTHER

## 2017-01-30 RX ORDER — NAPROXEN 500 MG/1
TABLET ORAL
Qty: 14 TABLET | Refills: 0 | Status: ON HOLD | OUTPATIENT
Start: 2017-01-30 | End: 2017-03-20 | Stop reason: HOSPADM

## 2017-01-30 RX ORDER — PEN NEEDLE, DIABETIC 30 GX3/16"
1 NEEDLE, DISPOSABLE MISCELLANEOUS 2 TIMES DAILY
Qty: 100 EACH | Refills: 3 | Status: SHIPPED | OUTPATIENT
Start: 2017-01-30 | End: 2017-06-14 | Stop reason: SDUPTHER

## 2017-01-30 RX ORDER — LISINOPRIL 5 MG/1
5 TABLET ORAL DAILY
Qty: 30 TABLET | Refills: 0 | Status: SHIPPED | OUTPATIENT
Start: 2017-01-30 | End: 2017-02-16 | Stop reason: SDUPTHER

## 2017-01-30 RX ORDER — AMLODIPINE BESYLATE 10 MG/1
10 TABLET ORAL DAILY
Qty: 90 TABLET | Refills: 3 | Status: SHIPPED | OUTPATIENT
Start: 2017-01-30 | End: 2018-05-07 | Stop reason: SDUPTHER

## 2017-01-30 RX ORDER — ATORVASTATIN CALCIUM 40 MG/1
40 TABLET, FILM COATED ORAL NIGHTLY
Qty: 90 TABLET | Refills: 3 | Status: SHIPPED | OUTPATIENT
Start: 2017-01-30 | End: 2018-05-07 | Stop reason: SDUPTHER

## 2017-01-30 RX ORDER — SERTRALINE HYDROCHLORIDE 50 MG/1
50 TABLET, FILM COATED ORAL DAILY
Qty: 90 TABLET | Refills: 3 | Status: SHIPPED | OUTPATIENT
Start: 2017-01-30 | End: 2017-08-22 | Stop reason: DRUGHIGH

## 2017-01-30 NOTE — TELEPHONE ENCOUNTER
Patient called for a follow from PDM appointment. Not able to leave message will call patient back.

## 2017-01-30 NOTE — TELEPHONE ENCOUNTER
----- Message from Andre Vargas MD sent at 2017  1:24 PM CST -----  Please inform has severe SANDRITA  Needs to return for CPAP      Name: Crow Green Date of Report: 16 Date of PS2017  [facility] No.: [id number] : 1955 Time of PSG: 10:13:56 PM - 6:36:46 AM  Sex: Male Age: 61 Weight: 204.0 lbs Height: 5  5  Type of PSG: Diagnostic  REASONS FOR REFERRAL: Mr. Green is a 61 year old male, [place] Dr. Andre Vargas and [place] [facility] [place] AYYA Saxena FNP-C, and Mateo Niño for evaluation of possible obstructive sleep apnea / hypopnea syndrome  (OSAHS). Dr. Vargas requested diagnostic polysomnography based on the patients reported snoring, observed respiratory  pauses in sleep, unrefreshing sleep and daytime hypersomnolence. His Pine Sleepiness Scale score was 17, clinically  significant, and his STOP  BANG score was 8, high risk of SANDRITA. Dr. Lambert is the patients primary care physician.  STUDY PARAMETERS: This diagnostic study involved analysis of the patient's sleep pattern while breathing unassisted. The  study was performed with a sleep technologist in attendance for the entire test period. Video monitoring was carried out  throughout the study, and the routine laboratory clinical parameters were recorded: NOTE: The polysomnography  electrophysiological record for this patient has been reviewed in its entirety by Dr. Jimenez.  SUMMARY STATEMENTS  DIAGNOSTIC IMPRESSIONS  G47.33 / 327.23 Obstructive Sleep Apnea, Adult (OSAHS) - Probably Severe  F51.04 / 307.42 Psychophysiological Insomnia (stress  related and conditioned; with depression and anxiety)  F51.5 / 307.47 Nightmare Disorder  K21.9 / 530.1 Sleep - Related Gastroesophageal Reflux  Z72.821 / V69.4 Inadequate Sleep Hygiene  F51.12 / 307.44 r/o Insufficient Sleep Syndrome  PRIMARY TREATMENT RECOMMENDATIONS Treat, or refer to [facility].  1. The sleep period lasted 387.7 minutes (6.5  hrs) and the total sleep time (TST) was 59.5 minutes (1.0 hrs) which resulted in a  sleep efficiency of only 11.8 %. Thus, there was insufficient sleep to provide a reliable diagnosis, or level of severity of,  sleep  related respiratory or movement disorder. However, there was enough sleep that the occurrence of such events can  noted and discussed.  The diagnostic polysomnography revealed a probably severe obstructive sleep apnea / hypopnea syndrome  (A + H Index = 103.9 events / hr asleep with 87.7 arousals / hr asleep for the study, and a mean SpO2 value of 92.9 %,  moderate, minimum oxygen saturation during sleep of 80.0 %, and waking baseline SpO2 = 96 %. There were no RERAs  (respiratory effort  related arousals). Sporadic, moderately loud snoring was noted. A split - night diagnostic (using re   titration criteria) and CPAP titration polysomnography is recommended.

## 2017-01-30 NOTE — TELEPHONE ENCOUNTER
Patient called appointment desk requesting refills on all his medications. He would like them to be sent to [facility] with a 90 day supply. Please review and advise.

## 2017-01-30 NOTE — TELEPHONE ENCOUNTER
----- Message from Anna Bashir sent at 1/30/2017 11:47 AM CST -----  Contact: [facility]   Caller states that pt need a refill LIDOCAINE  OINTMENT.   [phone]      ..  [facility]

## 2017-01-31 DIAGNOSIS — J45.909 MODERATE ASTHMA: Primary | ICD-10-CM

## 2017-01-31 DIAGNOSIS — R06.02 SHORTNESS OF BREATH: ICD-10-CM

## 2017-01-31 RX ORDER — ALBUTEROL SULFATE 90 UG/1
2 AEROSOL, METERED RESPIRATORY (INHALATION) EVERY 6 HOURS PRN
Qty: 18 G | Refills: 0 | Status: CANCELLED | OUTPATIENT
Start: 2017-01-31 | End: 2018-01-31

## 2017-01-31 RX ORDER — ALBUTEROL SULFATE 90 UG/1
2 AEROSOL, METERED RESPIRATORY (INHALATION) EVERY 6 HOURS PRN
Qty: 18 G | Refills: 11 | Status: SHIPPED | OUTPATIENT
Start: 2017-01-31 | End: 2017-04-18

## 2017-01-31 RX ORDER — FLUTICASONE PROPIONATE AND SALMETEROL 100; 50 UG/1; UG/1
1 POWDER RESPIRATORY (INHALATION) 2 TIMES DAILY
Qty: 180 EACH | Refills: 3 | Status: SHIPPED | OUTPATIENT
Start: 2017-01-31 | End: 2017-11-13

## 2017-01-31 RX ORDER — TIOTROPIUM BROMIDE 18 UG/1
18 CAPSULE ORAL; RESPIRATORY (INHALATION) DAILY
Qty: 90 CAPSULE | Refills: 3 | Status: SHIPPED | OUTPATIENT
Start: 2017-01-31 | End: 2019-07-29 | Stop reason: SDUPTHER

## 2017-02-02 ENCOUNTER — TELEPHONE (OUTPATIENT)
Dept: INTERNAL MEDICINE | Facility: CLINIC | Age: 62
End: 2017-02-02

## 2017-02-02 NOTE — TELEPHONE ENCOUNTER
----- Message from Jeniffer Montes sent at 2/2/2017 10:46 AM CST -----  needs callback rg insulin, will elaborate...[phone]

## 2017-02-02 NOTE — TELEPHONE ENCOUNTER
Patient called stating  [employer] wouldn't cover his Lantus and Humalog, advised the patient that is would be cheaper to get it filled at a local pharmacy. The patient would like to have a new script for both [facility] to [facility]. Please review and advise.

## 2017-02-05 NOTE — PROCEDURES
"The polysomnography electrophysiological record for this patient has been reviewed in its entirety.    Sleep architecture:  Total sleep time was 5.2 hours. Subject awake: 09:48pm to 11:33pm.Sleep efficiency was modest however significantly improved. Slow was sleep and REM sleep were improved.    Respiratory:  This was a full night CPAP titration study.  CPAP was initiated at 4.0 cm to CPAP 14 cm, with a full face Quattro Mirage medium mask.  C-flex (set to 3) and heated humidification were used throughout.      Motor movement / Parasomnia: No PLMs.     Cardiac: Normal, Average HR was 84 BPM with maximal  BPM.    Patient perception: Took minutes to fall asleep, slept well.      IMPRESSION:    1. Adequate titration: CPAP 14.0 cm tested with   REM non-supine sleep.  2. Sleep parameters( total sleep time, Slow wave and REM sleep and sleep efficiency)  3. Obesity based on BMI.    RECOMMENDATION:    1. Treatment with CPAP 14.0 cm. Resmed mirage quarto medium mask. Attention to mask leak.  2. Weight loss (Significant). Good data in Behavioral-based treatment programs. 2013 AHA/ACC/TOS guideline for the management of overweight and obesity in adults: a report of the American College of Cardiology/American Heart Association Task Force on Practice Guidelines and The Obesity Society        See imported Sleep Study result in "Chart Review" under the   "Media tab".      (This Sleep Study was interpreted by a Board Certified Sleep   Specialist who conducted an epoch-by-epoch review of the entire   raw data recording.)     (The indication for this sleep study was reviewed and deemed   appropriate by AASM Practice Parameters or other reasons by a   Board Certified Sleep Specialist.)    Andre Vargas MD      "

## 2017-02-10 ENCOUNTER — PROCEDURE VISIT (OUTPATIENT)
Dept: PULMONOLOGY | Facility: CLINIC | Age: 62
End: 2017-02-10
Payer: MEDICARE

## 2017-02-10 ENCOUNTER — OFFICE VISIT (OUTPATIENT)
Dept: PULMONOLOGY | Facility: CLINIC | Age: 62
End: 2017-02-10
Payer: MEDICARE

## 2017-02-10 VITALS
WEIGHT: 207.25 LBS | DIASTOLIC BLOOD PRESSURE: 78 MMHG | OXYGEN SATURATION: 96 % | HEART RATE: 91 BPM | BODY MASS INDEX: 34.53 KG/M2 | SYSTOLIC BLOOD PRESSURE: 122 MMHG | HEIGHT: 65 IN

## 2017-02-10 DIAGNOSIS — J45.40 ASTHMA, MODERATE PERSISTENT, WELL-CONTROLLED: ICD-10-CM

## 2017-02-10 DIAGNOSIS — R06.02 SOB (SHORTNESS OF BREATH): ICD-10-CM

## 2017-02-10 DIAGNOSIS — G47.33 SEVERE OBSTRUCTIVE SLEEP APNEA: Primary | ICD-10-CM

## 2017-02-10 PROCEDURE — 94060 EVALUATION OF WHEEZING: CPT | Mod: S$GLB,,, | Performed by: INTERNAL MEDICINE

## 2017-02-10 PROCEDURE — 99999 PR PBB SHADOW E&M-EST. PATIENT-LVL IV: CPT | Mod: PBBFAC,,, | Performed by: NURSE PRACTITIONER

## 2017-02-10 PROCEDURE — 3074F SYST BP LT 130 MM HG: CPT | Mod: S$GLB,,, | Performed by: NURSE PRACTITIONER

## 2017-02-10 PROCEDURE — 94726 PLETHYSMOGRAPHY LUNG VOLUMES: CPT | Mod: S$GLB,,, | Performed by: INTERNAL MEDICINE

## 2017-02-10 PROCEDURE — 99214 OFFICE O/P EST MOD 30 MIN: CPT | Mod: 25,S$GLB,, | Performed by: NURSE PRACTITIONER

## 2017-02-10 PROCEDURE — 3078F DIAST BP <80 MM HG: CPT | Mod: S$GLB,,, | Performed by: NURSE PRACTITIONER

## 2017-02-10 PROCEDURE — 94729 DIFFUSING CAPACITY: CPT | Mod: S$GLB,,, | Performed by: INTERNAL MEDICINE

## 2017-02-10 NOTE — Clinical Note
i noticed in July he had appointment with satnam and me, I cancel my appointment then noticed on last note said to follow up with me with cpft review, can you put my appointment back on and guess teri dumont. Thank you and sorry for the redo.

## 2017-02-10 NOTE — PROGRESS NOTES
Established Patient    Subjective:      Patient ID: Crow Green is a 61 y.o. male.    Patient Active Problem List   Diagnosis    ED (erectile dysfunction)    Testicular hypogonadism    Non-proliferative diabetic retinopathy, mild, both eyes    Hypertension    Diabetic polyneuropathy    NSTEMI (non-ST elevated myocardial infarction)    Type 2 diabetes mellitus with hyperglycemia    Abnormal nuclear stress test    Coronary artery disease involving native coronary artery without angina pectoris    Chronic combined systolic and diastolic congestive heart failure    Cough    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, with long-term current use of insulin    Obstructive sleep apnea syndrome    Moderate asthma    Delayed sleep phase syndrome    Psychophysiological insomnia    Nightmares    Sleep related gastroesophageal reflux disease    Inadequate sleep hygiene       Problem list has been reviewed.    he has been referred by Andre Vargas MD for evaluation and management for   Chief Complaint   Patient presents with    Sleep Apnea     review sleep study    Asthma     review cpft        Chief Complaint: Sleep Apnea (review sleep study) and Asthma (review cpft )      HPI:    Sleep Apnea  Patient presents for follow up visit for review of sleep study done 1/20/2017and CPAP titration done 1/30/2017.   That revealed severe obstructive sleep apnea with CPAP 14 cm adequate.  He reports he tolerated the full face mask in sleep lab and feels confident he will continue to tolerate.   He is motivated to treat his brijesh as he is hopeful will help his daytime sleepiness, low energy levels and frequent night time awakenings.   [place] 9    Asthma  He also presents for review of complete pft done today with diagnosis of asthma long standing.   He reports significant improvement in sensation of breathing and wheezing with beginning advair and  spiriva handi inhaler. Ventolin prn, has used twice since beginning spiriva and advair.   Patient initially rated in asthma control test at 9. However he reports to me significant improvement and well controlled.  ACT questionnaire redone and he scored 23.     Does he do nebulizer treatments? no  Does he use an inhaler? yes  Does he use a spacer with MDIs? yes  Does he monitor peak flow rates? yes   What is his personal best peak flow rate: 475     Patient is impressive with how detailed his is at following asthma plan of care and verbalizes well instructions from pul disease mgmt visit with continued proper technique on inhalers.     Previous Report Reviewed: office notes and PSG reports.      Past Medical History: The following portions of the patient's history were reviewed and updated as appropriate:   He  has no past surgical history on file.  His family history includes Cataracts in his father, mother, and sister; Glaucoma in his maternal aunt, mother, and sister. There is no history of Prostate cancer.  He  reports that he has never smoked. He does not have any smokeless tobacco history on file. He reports that he does not drink alcohol or use illicit drugs.  He has a current medication list which includes the following prescription(s): albuterol, amlodipine, aspirin, atorvastatin, blood sugar diagnostic, blood-glucose meter, fluticasone-salmeterol 100-50 mcg/dose, gabapentin, insulin glargine, insulin lispro, insulin syringe-needle u-100, latanoprost, lisinopril, metformin, metoprolol succinate, naproxen, nitroglycerin, pantoprazole, pen needle, diabetic, sertraline, and tiotropium.  He has No Known Allergies..    Review of Systems   Constitutional: Negative for fever, chills, weight loss, weight gain, activity change, appetite change, fatigue and night sweats.   HENT: Negative for postnasal drip, rhinorrhea, sinus pressure, voice change and congestion.    Eyes: Negative for redness and itching.    Respiratory: Positive for apnea (dx of sleep apnea, new order for cpap 14 cm) and snoring. Negative for cough, sputum production, chest tightness, shortness of breath, wheezing, orthopnea, asthma nighttime symptoms, dyspnea on extertion, use of rescue inhaler and somnolence.    Cardiovascular: Negative for chest pain, palpitations and leg swelling.   Genitourinary: Negative for difficulty urinating and hematuria.   Endocrine: Negative for polydipsia, polyphagia, cold intolerance, heat intolerance and polyuria.    Musculoskeletal: Positive for arthralgias ( intermittent) and back pain (chronic). Negative for gait problem, joint swelling and myalgias.   Skin: Negative.    Gastrointestinal: Negative for nausea, vomiting, abdominal pain and acid reflux.   Neurological: Negative for dizziness, weakness, light-headedness and headaches.   Hematological: Negative for adenopathy. No excessive bruising.   Psychiatric/Behavioral: Negative for sleep disturbance. The patient is not nervous/anxious.       Objective:     Physical Exam   Constitutional: He is oriented to person, place, and time. Vital signs are normal. He appears well-developed and well-nourished. He is active and cooperative.  Non-toxic appearance. No distress.   HENT:   Head: Normocephalic and atraumatic.   Nose: Nose normal.   Mouth/Throat: Oropharynx is clear and moist. No oropharyngeal exudate.   Cardiovascular: Normal rate, regular rhythm, normal heart sounds and intact distal pulses.    Pulmonary/Chest: Effort normal and breath sounds normal. No stridor.   Abdominal: Soft. Bowel sounds are normal.   Musculoskeletal: He exhibits no edema.   Neurological: He is alert and oriented to person, place, and time. He has normal reflexes.   Skin: Skin is warm and dry.   Psychiatric: He has a normal mood and affect. His behavior is normal. Judgment and thought content normal.   Vitals reviewed.    Vitals:    02/10/17 1004   BP: 122/78   Pulse: 91   SpO2: 96%  "  Weight: 94 kg (207 lb 3.7 oz)   Height: 5' 5" (1.651 m)   PainSc: 0-No pain     Estimated body mass index is 34.49 kg/(m^2) as calculated from the following:    Height as of this encounter: 5' 5" (1.651 m).    Weight as of this encounter: 94 kg (207 lb 3.7 oz).    Personal Diagnostic Review  PS2017 significant for severe sleep apnea with .9  CPAP titration 2017 CPAP 14 cm adequate.    Pulmonary function tests: 2/10/2017 FEV1: 2.02  (79 % predicted), FVC:  2.46 (75 % predicted), FEV1/FVC:  82 (105 % predicted), TLC: 3.78 (69 % predicted), RV/TLVC: 35 (94 % predicted), DLCO: 17.8 (119 % predicted)   No bronchodilator response.     Assessment:     1. Severe obstructive sleep apnea    2. Asthma, moderate persistent, well-controlled      Orders Placed This Encounter   Procedures    CPAP FOR HOME USE     Initial order for CPAP 14 cm     Order Specific Question:   Type:     Answer:   CPAP     Order Specific Question:   CPAP setting (cmH20):     Answer:   14     Order Specific Question:   Length of need (1-99 months):     Answer:   99     Order Specific Question:   Humidification:     Answer:   Heated     Order Specific Question:   Type of mask:     Answer:   FFM     Order Specific Question:   Headgear?     Answer:   Yes     Order Specific Question:   Tubing?     Answer:   Yes     Order Specific Question:   Humidifier chamber?     Answer:   Yes     Order Specific Question:   Chin strap?     Answer:   Yes     Order Specific Question:   Filters?     Answer:   Yes    CPAP/BIPAP SUPPLIES     Order Specific Question:   Height:     Answer:   5' 5" (1.651 m)     Order Specific Question:   Weight:     Answer:   207 lbs     Order Specific Question:   Does patient have medical equipment at home?     Answer:   CPAP     Order Specific Question:   Type of mask:     Answer:   FFM     Comments:   Patient preference     Order Specific Question:   Tubing?     Answer:   Yes     Order Specific Question:   Humidifier " chamber?     Answer:   Yes     Order Specific Question:   Chin strap?     Answer:   Yes     Order Specific Question:   Filters?     Answer:   Yes     Order Specific Question:   Length of need (1-99 months):     Answer:   99     Plan:     Discussed diagnosis, its evaluation, treatment and usual course. All questions answered.    Severe obstructive sleep apnea  Comments:  Initial set up for CPAP 14 cm   Orders:  -     CPAP FOR HOME USE  -     CPAP/BIPAP SUPPLIES    Asthma, moderate persistent, well-controlled  Comments:  reports at this visit asthma well controlled with Advair and spiriva. rare need to use Ventolin rescue.      Reviewed therapeutic goals for positive airway pressure therapy CPAP  Ideal is usage 100% of nights for 6 - 8 hours per night. Minimum usage is 70% of night for at least 4 hours per night used. Pateint expressed understanding.     Return in about 7 weeks (around 3/31/2017) for CPAP compliance follow up initial download after set up on CPAP. consider over night oximetry to determine if continued desat. While on cpap.     Patient also has 6 mo asthma follow up on 7/12/2017 with me with cpft review.

## 2017-02-10 NOTE — MR AVS SNAPSHOT
[facility] - Pulmonary Services  [address] [facility] [place] [address]  Phone: [phone]  Fax: [phone]                  Crow Green   2/10/2017 10:00 AM   Office Visit    Description:  Male : 1955   Provider:  Eri Armstrong NP   Department:  [facility] - Pulmonary Services           Reason for Visit     Sleep Apnea           Diagnoses this Visit        Comments    Severe obstructive sleep apnea    -  Primary Initial set up for CPAP 14 cm            To Do List           Future Appointments        Provider Department Dept Phone    3/8/2017 10:00 AM FIELDS, VISUAL-ONE [facility] - Ophthalmology [phone]    3/8/2017 10:45 AM SOILA Dias OD [facility] - Ophthalmology [phone]    2017 10:00 AM Eri Armstrong NP [facility] Pulmonary Services [phone]    2017 1:00 PM EDUCATION, DIABETES [facility] Diabetes Management [phone]    2017 8:20 AM LABORATORY, [facility] [phone]      Goals (5 Years of Data)     None      Follow-Up and Disposition     Return in about 7 weeks (around 3/31/2017) for CPAP compliance follow up initial download after set up on CPAP.      [facility]     [facility] Care [facility] - 24/7 Assistance  Registered nurses in the [facility] provide clinical advisement, health education, appointment booking, and other advisory services.  Call for this free service at [phone].             Medications                Verify that the below list of medications is an accurate representation of the medications you are currently taking.  If none reported, the list may be blank. If incorrect, please contact your healthcare provider. Carry this list with you in case of emergency.           Current Medications     albuterol 90 mcg/actuation inhaler Inhale 2 puffs into the lungs every 6 (six) hours as needed for Wheezing.    amlodipine (NORVASC) 10 MG tablet Take 1 tablet (10 mg total) by mouth once daily.    aspirin  "(ECOTRIN) 81 MG EC tablet Take 1 tablet (81 mg total) by mouth once daily.    atorvastatin (LIPITOR) 40 MG tablet Take 1 tablet (40 mg total) by mouth every evening.    blood sugar diagnostic Strp 1 strip by Misc.(Non-Drug; Combo Route) route 6 (six) times daily.    blood-glucose meter (CONTOUR NEXT EZ METER) kit Use as instructed    fluticasone-salmeterol 100-50 mcg/dose (ADVAIR DISKUS) 100-50 mcg/dose diskus inhaler Inhale 1 puff into the lungs 2 (two) times daily. Controller    gabapentin (NEURONTIN) 300 MG capsule Take 1 capsule (300 mg total) by mouth 3 (three) times daily.    insulin glargine (LANTUS SOLOSTAR) 100 unit/mL (3 mL) InPn pen Inject 50 Units into the skin every evening.    insulin lispro (HUMALOG KWIKPEN) 100 unit/mL InPn pen 10 units subcutaneously twice times a day 10-15 min before meals    insulin syringe-needle U-100 1 mL 31 gauge x 5/16 Syrg     latanoprost 0.005 % ophthalmic solution Place 1 drop into both eyes every evening.    lisinopril (PRINIVIL,ZESTRIL) 5 MG tablet Take 1 tablet (5 mg total) by mouth once daily.    metformin (GLUCOPHAGE) 1000 MG tablet Take 1 tablet (1,000 mg total) by mouth 2 (two) times daily with meals.    metoprolol succinate (TOPROL-XL) 50 MG 24 hr tablet Take 1 tablet (50 mg total) by mouth once daily.    naproxen (EC NAPROSYN) 500 MG EC tablet TAKE 1 TABLET(500 MG) BY MOUTH TWICE DAILY    nitroGLYCERIN (NITROSTAT) 0.3 MG SL tablet Place 1 tablet (0.3 mg total) under the tongue every 5 (five) minutes as needed for Chest pain. No more than 3 tablets in one day.    pantoprazole (PROTONIX) 40 MG tablet Take 1 tablet (40 mg total) by mouth once daily.    pen needle, diabetic 31 gauge x 5/16" Ndle Inject 1 pen into the skin 2 (two) times daily. Use one 2 times a day    sertraline (ZOLOFT) 50 MG tablet Take 1 tablet (50 mg total) by mouth once daily.    tiotropium (SPIRIVA WITH HANDIHALER) 18 mcg inhalation capsule Inhale 1 capsule (18 mcg total) into the lungs once " "daily. Controller           Clinical Reference Information           Your Vitals Were     BP Pulse Height SpO2          122/78 91 5' 5" (1.651 m) 96%        Blood Pressure          Most Recent Value    BP  122/78      Allergies as of 2/10/2017     No Known Allergies      Immunizations Administered on Date of Encounter - 2/10/2017     None      Orders Placed During Today's Visit      Normal Orders This Visit    CPAP FOR HOME USE     CPAP/BIPAP SUPPLIES       Instructions      Continuous Positive Air Pressure (CPAP)  Continuous positive air pressure (CPAP) uses gentle air pressure to hold the airway open. CPAP is often the most effective treatment for sleep apnea and severe snoring. It works very well for many people. But keep in mind that it can take several adjustments before the setup is right for you.    How CPAP works  The CPAPmachine  is a small portable pump beside the bed. The pump sends air through a hose, which is held over your nose and mouth by a mask. Mild air pressure is gently pushed through your airway. The air pressure nudges sagging tissues aside. This widens the airway so you can breathe better. CPAP may be combined with other kinds of therapy for sleep apnea.     A mask over the nose gently directs air into the throat to keep the airway open.   Types of air pressure treatments  There are different types of CPAP. Your doctor or CPAP technician will help you decide which type is best for you:  · Basic CPAP keeps the pressure constant all night long.  · A bilevel device (BiPAP) provides more pressure when you breathe in and less when you breathe out. A BiPAP machine also may be set to provide automatic breaths to maintain breathing if you stop breathing while sleeping.  · An autoCPAP device automatically adjusts pressure throughout the night and in response to changes such as body position, sleep stage, and snoring.  Date Last Reviewed: 8/10/2015  © 6088-2291 [employer]. [address] [place] [address] " [place] [address]. All rights reserved. This information is not intended as a substitute for professional medical care. Always follow your healthcare professional's instructions.        Obstructive Sleep Apnea  Obstructive sleep apnea is a condition that causes your air passages to become narrowed or blocked during sleep. As a result, breathing stops for short periods. Your body wakes up enough for breathing to begin again, though you don't remember it. The cycle of stopped breathing and brief awakenings can repeat dozens of times a night. This prevents the body from getting to the deeper stages of sleep that are needed for good rest.  Signs of sleep apnea include loud snoring, noisy breathing, and gasping sounds during sleep. Daytime symptoms include waking up tired after a full night's sleep, waking up with headaches, feeling very sleepy or falling asleep during the day, and having problems with memory or concentration.  Risk factors for sleep apnea include:  · Being overweight  · Being a man, or a woman in menopause  · Smoking  · Using alcohol or sedating medications or herbs  · Having enlarged structures in the nose or throat  Home care  Lifestyle changes that can help treat snoring and sleep apnea include the following:  · If you are overweight, lose weight. Talk to your healthcare provider about a weight-loss plan for you.  · Avoid alcohol for 3 to 4 hours before bedtime. Avoid sedating medications. Ask your healthcare provider about the medications you take.  · If you smoke, talk to your healthcare provider about ways to quit.  · Sleep on your side. This can help prevent gravity from pulling relaxed throat tissues into your breathing passages.  · If you have allergies or sinus problems that block your nose, ask your healthcare provider for help.  Follow up  Follow up with your healthcare provider as advised. A diagnosis of sleep apnea is made with a sleep study. Your healthcare provider can tell  you more about this test.  When to seek medical care  Sleep apnea can make you more likely to have certain health problems. These include high blood pressure, heart attack, stroke, and sexual dysfunction. If you have sleep apnea, talk to your healthcare provider about the best treatments for you.  Date Last Reviewed: 5/3/2015  © 4781-5088 [employer]. [address] [place] [address], [place] [address]. All rights reserved. This information is not intended as a substitute for professional medical care. Always follow your healthcare professional's instructions.             Language Assistance Services     ATTENTION: Language assistance services are available, free of charge. Please call [phone].      ATENCIÓN: Si habla español, tiene a garcia disposición servicios gratuitos de asistencia lingüística. Llame al [phone].     CHÚ Ý: N?u b?n nói Ti?ng Vi?t, có các d?ch v? h? tr? ngôn ng? mi?n phí dành cho b?n. G?i s? [phone].         [facility] - [facility] complies with applicable Federal civil rights laws and does not discriminate on the basis of race, color, national origin, age, disability, or sex.

## 2017-02-10 NOTE — PATIENT INSTRUCTIONS
Continuous Positive Air Pressure (CPAP)  Continuous positive air pressure (CPAP) uses gentle air pressure to hold the airway open. CPAP is often the most effective treatment for sleep apnea and severe snoring. It works very well for many people. But keep in mind that it can take several adjustments before the setup is right for you.    How CPAP works  The CPAPmachine  is a small portable pump beside the bed. The pump sends air through a hose, which is held over your nose and mouth by a mask. Mild air pressure is gently pushed through your airway. The air pressure nudges sagging tissues aside. This widens the airway so you can breathe better. CPAP may be combined with other kinds of therapy for sleep apnea.     A mask over the nose gently directs air into the throat to keep the airway open.   Types of air pressure treatments  There are different types of CPAP. Your doctor or CPAP technician will help you decide which type is best for you:  · Basic CPAP keeps the pressure constant all night long.  · A bilevel device (BiPAP) provides more pressure when you breathe in and less when you breathe out. A BiPAP machine also may be set to provide automatic breaths to maintain breathing if you stop breathing while sleeping.  · An autoCPAP device automatically adjusts pressure throughout the night and in response to changes such as body position, sleep stage, and snoring.  Date Last Reviewed: 8/10/2015  © 2832-6599 [employer]. [address] [place] [address]. All rights reserved. This information is not intended as a substitute for professional medical care. Always follow your healthcare professional's instructions.        Obstructive Sleep Apnea  Obstructive sleep apnea is a condition that causes your air passages to become narrowed or blocked during sleep. As a result, breathing stops for short periods. Your body wakes up enough for breathing to begin again, though you don't remember it. The cycle  of stopped breathing and brief awakenings can repeat dozens of times a night. This prevents the body from getting to the deeper stages of sleep that are needed for good rest.  Signs of sleep apnea include loud snoring, noisy breathing, and gasping sounds during sleep. Daytime symptoms include waking up tired after a full night's sleep, waking up with headaches, feeling very sleepy or falling asleep during the day, and having problems with memory or concentration.  Risk factors for sleep apnea include:  · Being overweight  · Being a man, or a woman in menopause  · Smoking  · Using alcohol or sedating medications or herbs  · Having enlarged structures in the nose or throat  Home care  Lifestyle changes that can help treat snoring and sleep apnea include the following:  · If you are overweight, lose weight. Talk to your healthcare provider about a weight-loss plan for you.  · Avoid alcohol for 3 to 4 hours before bedtime. Avoid sedating medications. Ask your healthcare provider about the medications you take.  · If you smoke, talk to your healthcare provider about ways to quit.  · Sleep on your side. This can help prevent gravity from pulling relaxed throat tissues into your breathing passages.  · If you have allergies or sinus problems that block your nose, ask your healthcare provider for help.  Follow up  Follow up with your healthcare provider as advised. A diagnosis of sleep apnea is made with a sleep study. Your healthcare provider can tell you more about this test.  When to seek medical care  Sleep apnea can make you more likely to have certain health problems. These include high blood pressure, heart attack, stroke, and sexual dysfunction. If you have sleep apnea, talk to your healthcare provider about the best treatments for you.  Date Last Reviewed: 5/3/2015  © 5302-0859 [employer]. [address] [place] [address], [place] [address]. All rights reserved. This information is not intended as a substitute  for professional medical care. Always follow your healthcare professional's instructions.

## 2017-02-10 NOTE — LETTER
February 10, 2017      Andre Vargas MD  [address] [facility] [place] [address]           [facility] - Pulmonary Services  [address] [place] [address]  Phone: [phone]  Fax: [phone]          Patient: Crow Green   MR Number: [id number]   YOB: 1955   Date of Visit: 2/10/2017       Dear Dr. Andre Vargas:    Thank you for referring Crow Green to me for evaluation. Attached you will find relevant portions of my assessment and plan of care.    If you have questions, please do not hesitate to call me. I look forward to following Crow Green along with you.    Sincerely,    Eri Armstrong, NP    Enclosure  CC:  No Recipients    If you would like to receive this communication electronically, please contact [email or web address] or [phone] to request more information on [email or web address] Link access.    For providers and/or their staff who would like to refer a patient to [facility], please contact us through our one-stop-shop provider referral line, [facility] , at [phone].    If you feel you have received this communication in error or would no longer like to receive these types of communications, please e-mail [email or web address]

## 2017-02-11 LAB
POST FEF 25 75: 2.33 L/S (ref 1.73–3.26)
POST FET 100: 8.17 S
POST FEV1 FVC: 86 %
POST FEV1: 1.77 L (ref 2.21–2.93)
POST FIF 50: 1.88 L/S
POST FVC: 2.06 L (ref 2.9–3.71)
POST PEF: 5.42 L/S (ref 6.12–8.4)
PRE DLCO: 17.78 ML/MMHG/MIN (ref 19.71–28)
PRE ERV: 0.29 L
PRE FEF 25 75: 2.17 L/S (ref 1.73–3.26)
PRE FET 100: 9.03 S
PRE FEV1 FVC: 82 %
PRE FEV1: 2.02 L (ref 2.21–2.93)
PRE FIF 50: 0.69 L/S
PRE FRC PL: 1.61 L (ref 2.08–3.29)
PRE FVC: 2.46 L (ref 2.9–3.71)
PRE KROGHS K: 4.66 1/MIN
PRE PEF: 7.7 L/S (ref 6.12–8.4)
PRE RV: 1.32 L (ref 1.62–2.41)
PRE SVC: 2.46 L
PRE TLC: 3.78 L (ref 4.98–5.98)
PREDICTED DLCO: 23.85 ML/MMHG/MIN (ref 19.71–28)
PREDICTED FEV1 FVC: 78.45 % (ref 73.25–83.66)
PREDICTED FEV1: 2.57 L (ref 2.21–2.93)
PREDICTED FRC N2: 2.69 L (ref 2.08–3.29)
PREDICTED FRC PL: 2.69 L (ref 2.08–3.29)
PREDICTED FVC: 3.3 L (ref 2.9–3.71)
PREDICTED RV: 2.01 L (ref 1.62–2.41)
PREDICTED SVC: 3.39 L
PREDICTED TLC: 5.48 L (ref 4.98–5.98)
PROVOCATION PROTOCOL: ABNORMAL

## 2017-02-16 ENCOUNTER — OFFICE VISIT (OUTPATIENT)
Dept: INTERNAL MEDICINE | Facility: CLINIC | Age: 62
End: 2017-02-16
Payer: MEDICARE

## 2017-02-16 VITALS
RESPIRATION RATE: 16 BRPM | HEIGHT: 65 IN | WEIGHT: 211 LBS | BODY MASS INDEX: 35.16 KG/M2 | DIASTOLIC BLOOD PRESSURE: 73 MMHG | HEART RATE: 95 BPM | SYSTOLIC BLOOD PRESSURE: 135 MMHG | OXYGEN SATURATION: 95 % | TEMPERATURE: 98 F

## 2017-02-16 PROCEDURE — 99214 OFFICE O/P EST MOD 30 MIN: CPT | Mod: S$GLB,,, | Performed by: FAMILY MEDICINE

## 2017-02-16 PROCEDURE — 3078F DIAST BP <80 MM HG: CPT | Mod: S$GLB,,, | Performed by: FAMILY MEDICINE

## 2017-02-16 PROCEDURE — 4010F ACE/ARB THERAPY RXD/TAKEN: CPT | Mod: S$GLB,,, | Performed by: FAMILY MEDICINE

## 2017-02-16 PROCEDURE — 3046F HEMOGLOBIN A1C LEVEL >9.0%: CPT | Mod: S$GLB,,, | Performed by: FAMILY MEDICINE

## 2017-02-16 PROCEDURE — 3075F SYST BP GE 130 - 139MM HG: CPT | Mod: S$GLB,,, | Performed by: FAMILY MEDICINE

## 2017-02-16 PROCEDURE — 99999 PR PBB SHADOW E&M-EST. PATIENT-LVL III: CPT | Mod: PBBFAC,,, | Performed by: FAMILY MEDICINE

## 2017-02-16 RX ORDER — LISINOPRIL 5 MG/1
5 TABLET ORAL DAILY
Qty: 90 TABLET | Refills: 2 | Status: SHIPPED | OUTPATIENT
Start: 2017-02-16 | End: 2017-11-14 | Stop reason: SDUPTHER

## 2017-02-16 NOTE — MR AVS SNAPSHOT
[facility] - Internal Medicine  [address] [address] [address]  Phone: [phone]                  Crow Green   2017 11:40 AM   Office Visit    Description:  Male : 1955   Provider:  Mateo Lambert DO   Department:  [facility] - Internal Medicine           Reason for Visit     Follow-up                To Do List           Future Appointments        Provider Department Dept Phone    3/8/2017 10:00 AM FIELDS, VISUAL-ONE [facility] - Ophthalmology [phone]    3/8/2017 10:45 AM SOILA Dias OD [facility] - Ophthalmology [phone]    2017 10:00 AM Eri Armstrong, NP [facility] Pulmonary Services [phone]    2017 1:00 PM EDUCATION, DIABETES [facility] Diabetes Management [phone]    2017 8:20 AM LABORATORY, [facility] [phone]      Goals (5 Years of Data)     None      Follow-Up and Disposition     Return in about 2 months (around 2017).       These Medications        Disp Refills Start End    lisinopril (PRINIVIL,ZESTRIL) 5 MG tablet 90 tablet 2 2017    Take 1 tablet (5 mg total) by mouth once daily. - Oral    Pharmacy: [facility] - [place] [id number] [facility] Ph #: [phone]         [facility]     [facility] Care [facility] -  Assistance  Registered nurses in the [facility] provide clinical advisement, health education, appointment booking, and other advisory services.  Call for this free service at [phone].             Medications                Verify that the below list of medications is an accurate representation of the medications you are currently taking.  If none reported, the list may be blank. If incorrect, please contact your healthcare provider. Carry this list with you in case of emergency.           Current Medications     albuterol 90 mcg/actuation inhaler Inhale 2 puffs into the lungs every 6 (six) hours as needed for Wheezing.     "amlodipine (NORVASC) 10 MG tablet Take 1 tablet (10 mg total) by mouth once daily.    aspirin (ECOTRIN) 81 MG EC tablet Take 1 tablet (81 mg total) by mouth once daily.    atorvastatin (LIPITOR) 40 MG tablet Take 1 tablet (40 mg total) by mouth every evening.    blood sugar diagnostic Strp 1 strip by Misc.(Non-Drug; Combo Route) route 6 (six) times daily.    blood-glucose meter (CONTOUR NEXT EZ METER) kit Use as instructed    fluticasone-salmeterol 100-50 mcg/dose (ADVAIR DISKUS) 100-50 mcg/dose diskus inhaler Inhale 1 puff into the lungs 2 (two) times daily. Controller    gabapentin (NEURONTIN) 300 MG capsule Take 1 capsule (300 mg total) by mouth 3 (three) times daily.    insulin glargine (LANTUS SOLOSTAR) 100 unit/mL (3 mL) InPn pen Inject 50 Units into the skin every evening.    insulin lispro (HUMALOG KWIKPEN) 100 unit/mL InPn pen 10 units subcutaneously twice times a day 10-15 min before meals    insulin syringe-needle U-100 1 mL 31 gauge x 5/16 Syrg     latanoprost 0.005 % ophthalmic solution Place 1 drop into both eyes every evening.    lisinopril (PRINIVIL,ZESTRIL) 5 MG tablet Take 1 tablet (5 mg total) by mouth once daily.    metformin (GLUCOPHAGE) 1000 MG tablet Take 1 tablet (1,000 mg total) by mouth 2 (two) times daily with meals.    metoprolol succinate (TOPROL-XL) 50 MG 24 hr tablet Take 1 tablet (50 mg total) by mouth once daily.    naproxen (EC NAPROSYN) 500 MG EC tablet TAKE 1 TABLET(500 MG) BY MOUTH TWICE DAILY    nitroGLYCERIN (NITROSTAT) 0.3 MG SL tablet Place 1 tablet (0.3 mg total) under the tongue every 5 (five) minutes as needed for Chest pain. No more than 3 tablets in one day.    pantoprazole (PROTONIX) 40 MG tablet Take 1 tablet (40 mg total) by mouth once daily.    pen needle, diabetic 31 gauge x 5/16" Ndle Inject 1 pen into the skin 2 (two) times daily. Use one 2 times a day    sertraline (ZOLOFT) 50 MG tablet Take 1 tablet (50 mg total) by mouth once daily.    tiotropium (SPIRIVA WITH " "HANDIHALER) 18 mcg inhalation capsule Inhale 1 capsule (18 mcg total) into the lungs once daily. Controller           Clinical Reference Information           Your Vitals Were     BP Pulse Temp Resp    135/73 (BP Location: Left arm, Patient Position: Sitting, BP Method: Automatic) 95 97.9 °F (36.6 °C) (Tympanic) 16    Height Weight SpO2 BMI    5' 5" (1.651 m) 95.7 kg (210 lb 15.7 oz) 95% 35.11 kg/m2      Blood Pressure          Most Recent Value    BP  135/73      Allergies as of 2/16/2017     No Known Allergies      Immunizations Administered on Date of Encounter - 2/16/2017     None      Language Assistance Services     ATTENTION: Language assistance services are available, free of charge. Please call [phone].      ATENCIÓN: Si patriciala julien, tiene a garcia disposición servicios gratuitos de asistencia lingüística. Llame al [phone].     ERIKA Ý: N?u b?n nói Ti?ng Vi?t, có các d?ch v? h? tr? ngôn ng? mi?n phí dành cho b?n. G?i s? [phone].         [facility] - [facility] Medicine complies with applicable Federal civil rights laws and does not discriminate on the basis of race, color, national origin, age, disability, or sex.        "

## 2017-02-16 NOTE — PROGRESS NOTES
Subjective:       Patient ID: Crow Green is a 61 y.o. male.    Chief Complaint: Follow-up (3 month type 2 dm/htn)    Diabetes   He presents for his follow-up diabetic visit. He has type 2 diabetes mellitus. His disease course has been improving. Pertinent negatives for hypoglycemia include no dizziness, nervousness/anxiousness or speech difficulty. Pertinent negatives for diabetes include no chest pain. Current diabetic treatment includes intensive insulin program and oral agent (monotherapy). He is compliant with treatment all of the time. His weight is stable. He participates in exercise intermittently. His home blood glucose trend is decreasing steadily. An ACE inhibitor/angiotensin II receptor blocker is being taken. He sees a podiatrist.Eye exam is current.       Family History   Problem Relation Age of Onset    Glaucoma Mother     Cataracts Mother     Cataracts Father     Glaucoma Sister     Cataracts Sister     Glaucoma Maternal Aunt     Prostate cancer Neg Hx      Current Outpatient Prescriptions on File Prior to Visit   Medication Sig Dispense Refill    albuterol 90 mcg/actuation inhaler Inhale 2 puffs into the lungs every 6 (six) hours as needed for Wheezing. 18 g 11    amlodipine (NORVASC) 10 MG tablet Take 1 tablet (10 mg total) by mouth once daily. 90 tablet 3    aspirin (ECOTRIN) 81 MG EC tablet Take 1 tablet (81 mg total) by mouth once daily.  0    atorvastatin (LIPITOR) 40 MG tablet Take 1 tablet (40 mg total) by mouth every evening. 90 tablet 3    blood sugar diagnostic Strp 1 strip by Misc.(Non-Drug; Combo Route) route 6 (six) times daily. 160 strip 11    blood-glucose meter (CONTOUR NEXT EZ METER) kit Use as instructed 1 each 0    fluticasone-salmeterol 100-50 mcg/dose (ADVAIR DISKUS) 100-50 mcg/dose diskus inhaler Inhale 1 puff into the lungs 2 (two) times daily. Controller 180 each 3    gabapentin (NEURONTIN) 300 MG capsule Take 1 capsule (300 mg total) by mouth 3 (three)  "times daily. 270 capsule 3    insulin glargine (LANTUS SOLOSTAR) 100 unit/mL (3 mL) InPn pen Inject 50 Units into the skin every evening. 2 Box 11    insulin lispro (HUMALOG KWIKPEN) 100 unit/mL InPn pen 10 units subcutaneously twice times a day 10-15 min before meals 1 Box 11    insulin syringe-needle U-100 1 mL 31 gauge x 5/16 Syrg       latanoprost 0.005 % ophthalmic solution Place 1 drop into both eyes every evening. 1 Bottle 4    metformin (GLUCOPHAGE) 1000 MG tablet Take 1 tablet (1,000 mg total) by mouth 2 (two) times daily with meals. 180 tablet 3    metoprolol succinate (TOPROL-XL) 50 MG 24 hr tablet Take 1 tablet (50 mg total) by mouth once daily. 30 tablet 0    naproxen (EC NAPROSYN) 500 MG EC tablet TAKE 1 TABLET(500 MG) BY MOUTH TWICE DAILY 14 tablet 0    nitroGLYCERIN (NITROSTAT) 0.3 MG SL tablet Place 1 tablet (0.3 mg total) under the tongue every 5 (five) minutes as needed for Chest pain. No more than 3 tablets in one day. 30 tablet 1    pantoprazole (PROTONIX) 40 MG tablet Take 1 tablet (40 mg total) by mouth once daily. 30 tablet 11    pen needle, diabetic 31 gauge x 5/16" Ndle Inject 1 pen into the skin 2 (two) times daily. Use one 2 times a day 100 each 3    sertraline (ZOLOFT) 50 MG tablet Take 1 tablet (50 mg total) by mouth once daily. 90 tablet 3    tiotropium (SPIRIVA WITH HANDIHALER) 18 mcg inhalation capsule Inhale 1 capsule (18 mcg total) into the lungs once daily. Controller 90 capsule 3    [DISCONTINUED] lisinopril (PRINIVIL,ZESTRIL) 5 MG tablet Take 1 tablet (5 mg total) by mouth once daily. 30 tablet 0     No current facility-administered medications on file prior to visit.        Review of Systems   Constitutional: Negative for chills, fever and unexpected weight change.   HENT: Negative for congestion, ear pain, sore throat and voice change.    Eyes: Negative for pain and visual disturbance.   Respiratory: Negative for cough, shortness of breath and wheezing.  " "  Cardiovascular: Negative for chest pain.   Gastrointestinal: Negative for abdominal pain, nausea and vomiting.   Genitourinary: Negative for difficulty urinating.   Musculoskeletal: Negative for back pain.   Skin: Negative for rash.   Neurological: Negative for dizziness and speech difficulty.   Hematological: Does not bruise/bleed easily.   Psychiatric/Behavioral: Negative for sleep disturbance and suicidal ideas. The patient is not nervous/anxious.        Objective:     Visit Vitals    /73 (BP Location: Left arm, Patient Position: Sitting, BP Method: Automatic)    Pulse 95    Temp 97.9 °F (36.6 °C) (Tympanic)    Resp 16    Ht 5' 5" (1.651 m)    Wt 95.7 kg (210 lb 15.7 oz)    SpO2 95%    BMI 35.11 kg/m2        Physical Exam   Constitutional: He is oriented to person, place, and time. He appears well-developed and well-nourished.   HENT:   Head: Normocephalic and atraumatic.   Eyes: Conjunctivae are normal.   Cardiovascular: Normal rate.    Pulmonary/Chest: Effort normal. No respiratory distress.   Musculoskeletal: He exhibits no edema.   Neurological: He is alert and oriented to person, place, and time. Coordination normal.   Skin: Skin is warm and dry. No rash noted.   Psychiatric: He has a normal mood and affect. His behavior is normal.   Vitals reviewed.      Assessment & Plan     1. 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, with long-term current use of insulin  Continuing to work on better control.  At the last visit I had recommended that he increase the Humalog taken before meals to be increased to 20 units however he has continued to use 10 units 3 times a day.  I recommended that he continue the same dose of Lantus which is 50 units at night but to please increase the Humalog to 15 units before meals.  Also emphasized importance of adhering to metformin.  We will recheck his hemoglobin A1c at the coming visit.  " Fortunately his blood pressure is well controlled and other things are going well.  He is planning to join a gym soon through [employer].

## 2017-02-20 RX ORDER — METOPROLOL SUCCINATE 50 MG/1
TABLET, EXTENDED RELEASE ORAL
Qty: 30 TABLET | Refills: 0 | Status: SHIPPED | OUTPATIENT
Start: 2017-02-20 | End: 2017-03-13 | Stop reason: SDUPTHER

## 2017-02-27 ENCOUNTER — TELEPHONE (OUTPATIENT)
Dept: INTERNAL MEDICINE | Facility: CLINIC | Age: 62
End: 2017-02-27

## 2017-02-27 NOTE — TELEPHONE ENCOUNTER
----- Message from Celine Jordan sent at 2/27/2017  2:10 PM CST -----  Contact: [facility]  Caller request RX for Lidocaine ointment...  Please contact caller at [phone], Reference Number is [id number], please fax to [phone]

## 2017-03-08 ENCOUNTER — OFFICE VISIT (OUTPATIENT)
Dept: OPHTHALMOLOGY | Facility: CLINIC | Age: 62
End: 2017-03-08
Payer: MEDICARE

## 2017-03-08 DIAGNOSIS — H40.1134 PRIMARY OPEN ANGLE GLAUCOMA OF BOTH EYES, INDETERMINATE STAGE: Primary | ICD-10-CM

## 2017-03-08 PROCEDURE — 92012 INTRM OPH EXAM EST PATIENT: CPT | Mod: S$GLB,,, | Performed by: OPTOMETRIST

## 2017-03-08 PROCEDURE — 99999 PR PBB SHADOW E&M-EST. PATIENT-LVL I: CPT | Mod: PBBFAC,,, | Performed by: OPTOMETRIST

## 2017-03-08 PROCEDURE — 92133 CPTRZD OPH DX IMG PST SGM ON: CPT | Mod: S$GLB,,, | Performed by: OPTOMETRIST

## 2017-03-08 PROCEDURE — 92083 EXTENDED VISUAL FIELD XM: CPT | Mod: S$GLB,,, | Performed by: OPTOMETRIST

## 2017-03-08 NOTE — MR AVS SNAPSHOT
[facility]  [address] [facility] [address] [place] [address]  Phone: [phone]  Fax: [phone]                  Crow Green   3/8/2017 10:45 AM   Office Visit    Description:  Male : 1955   Provider:  SOILA Dias, OD   Department:  [facility]           Reason for Visit     Glaucoma           Diagnoses this Visit        Comments    Primary open angle glaucoma of both eyes, indeterminate stage    -  Primary            To Do List           Future Appointments        Provider Department Dept Phone    2017 10:00 AM Eri Armstrong NP [facility] Pulmonary Services [phone]    2017 1:00 PM EDUCATION, DIABETES [facility] Diabetes Management [phone]    2017 8:20 AM LABORATORY, [facility] [phone]    2017 8:30 AM SPECIMEN, [facility] [phone]    2017 9:00 AM Barb Veras DPM [facility] Podiatry [phone]      Goals (5 Years of Data)     None      Follow-Up and Disposition     Return in about 9 months (around 2017).      [facility]     [facility] Care [facility] -  Assistance  Registered nurses in the [facility] provide clinical advisement, health education, appointment booking, and other advisory services.  Call for this free service at [phone].             Medications                Verify that the below list of medications is an accurate representation of the medications you are currently taking.  If none reported, the list may be blank. If incorrect, please contact your healthcare provider. Carry this list with you in case of emergency.           Current Medications     albuterol 90 mcg/actuation inhaler Inhale 2 puffs into the lungs every 6 (six) hours as needed for Wheezing.    amlodipine (NORVASC) 10 MG tablet Take 1 tablet (10 mg total) by mouth once daily.    aspirin (ECOTRIN) 81 MG EC tablet Take 1 tablet (81 mg total) by mouth once daily.     "atorvastatin (LIPITOR) 40 MG tablet Take 1 tablet (40 mg total) by mouth every evening.    blood sugar diagnostic Strp 1 strip by Misc.(Non-Drug; Combo Route) route 6 (six) times daily.    blood-glucose meter (CONTOUR NEXT EZ METER) kit Use as instructed    fluticasone-salmeterol 100-50 mcg/dose (ADVAIR DISKUS) 100-50 mcg/dose diskus inhaler Inhale 1 puff into the lungs 2 (two) times daily. Controller    gabapentin (NEURONTIN) 300 MG capsule Take 1 capsule (300 mg total) by mouth 3 (three) times daily.    insulin glargine (LANTUS SOLOSTAR) 100 unit/mL (3 mL) InPn pen Inject 50 Units into the skin every evening.    insulin lispro (HUMALOG KWIKPEN) 100 unit/mL InPn pen 10 units subcutaneously twice times a day 10-15 min before meals    insulin syringe-needle U-100 1 mL 31 gauge x 5/16 Syrg     latanoprost 0.005 % ophthalmic solution Place 1 drop into both eyes every evening.    lisinopril (PRINIVIL,ZESTRIL) 5 MG tablet Take 1 tablet (5 mg total) by mouth once daily.    metformin (GLUCOPHAGE) 1000 MG tablet Take 1 tablet (1,000 mg total) by mouth 2 (two) times daily with meals.    metoprolol succinate (TOPROL-XL) 50 MG 24 hr tablet TAKE 1 TABLET ONE TIME DAILY    naproxen (EC NAPROSYN) 500 MG EC tablet TAKE 1 TABLET(500 MG) BY MOUTH TWICE DAILY    nitroGLYCERIN (NITROSTAT) 0.3 MG SL tablet Place 1 tablet (0.3 mg total) under the tongue every 5 (five) minutes as needed for Chest pain. No more than 3 tablets in one day.    pantoprazole (PROTONIX) 40 MG tablet Take 1 tablet (40 mg total) by mouth once daily.    pen needle, diabetic 31 gauge x 5/16" Ndle Inject 1 pen into the skin 2 (two) times daily. Use one 2 times a day    sertraline (ZOLOFT) 50 MG tablet Take 1 tablet (50 mg total) by mouth once daily.    tiotropium (SPIRIVA WITH HANDIHALER) 18 mcg inhalation capsule Inhale 1 capsule (18 mcg total) into the lungs once daily. Controller           Clinical Reference Information           Allergies as of 3/8/2017     No " Known Allergies      Immunizations Administered on Date of Encounter - 3/8/2017     None      Orders Placed During Today's Visit      Normal Orders This Visit    Ch Visual Field - OU - Extended - Both Eyes     OCT - Optic Nerve       Language Assistance Services     ATTENTION: Language assistance services are available, free of charge. Please call [phone].      ATENCIÓN: Si guy victoria, tiene a garcia disposición servicios gratuitos de asistencia lingüística. Llame al [phone].     CHÚ Ý: N?u b?n nói Ti?ng Vi?t, có các d?ch v? h? tr? ngôn ng? mi?n phí dành cho b?n. G?i s? [phone].         [facility] complies with applicable Federal civil rights laws and does not discriminate on the basis of race, color, national origin, age, disability, or sex.

## 2017-03-08 NOTE — PROGRESS NOTES
HPI     Last MLC exam 12/07/2016; saw Am[facility] in 2015 with IOP of 25/30 off   drops.  He was being treated by [facility] doctors but now wants care with   [facility].    COAG patient treated outside this clinic in the past.  He has been using   latanoprost qhs OU.  HVF/GOCT  Doctor is to go over results with patient  COAG  diabetic  Medication: Latanoprost 0.005%  Last Dilated exam 12/07/2016         Last edited by SOILA Dias, OD on 3/8/2017  4:02 PM.         Assessment /Plan     For exam results, see Encounter Report.    Primary open angle glaucoma of both eyes, indeterminate stage  -     Ch Visual Field - OU - Extended - Both Eyes  -     Cancel: Posterior Segment OCT Retina-Both eyes  -     OCT - Optic Nerve      He has COAG VF and NFL changes/defects moreso in the OD>OS but in both eyes.  I will continue his latanoprost and do a repeat IOP in 3 months.  He will need repeat HVF and GOCT in 9 months.

## 2017-03-09 ENCOUNTER — TELEPHONE (OUTPATIENT)
Dept: INTERNAL MEDICINE | Facility: CLINIC | Age: 62
End: 2017-03-09

## 2017-03-09 NOTE — TELEPHONE ENCOUNTER
----- Message from Salazar Baron sent at 3/9/2017  3:24 PM CST -----  Contact: pt  He's calling in in regards to rescheduling labs, there is a mix-up in dr's, when rescheduling a referral for Dr. Archana Dias is coming up... This is incorrect, please advise, [phone] (home), please call pt when this has been corrected.. He would like to reschedule after 3/14/17...

## 2017-03-10 ENCOUNTER — TELEPHONE (OUTPATIENT)
Dept: DIABETES | Facility: CLINIC | Age: 62
End: 2017-03-10

## 2017-03-10 NOTE — TELEPHONE ENCOUNTER
----- Message from Salazar Baron sent at 3/9/2017  3:10 PM CST -----  Contact: pt  He's calling in regards to rescheduling his diabetic classes, please advise, [phone] (home)

## 2017-03-14 RX ORDER — METOPROLOL SUCCINATE 50 MG/1
TABLET, EXTENDED RELEASE ORAL
Qty: 30 TABLET | Refills: 5 | Status: SHIPPED | OUTPATIENT
Start: 2017-03-14 | End: 2018-02-05 | Stop reason: SDUPTHER

## 2017-03-15 ENCOUNTER — OFFICE VISIT (OUTPATIENT)
Dept: INTERNAL MEDICINE | Facility: CLINIC | Age: 62
DRG: 417 | End: 2017-03-15
Payer: MEDICARE

## 2017-03-15 ENCOUNTER — HOSPITAL ENCOUNTER (INPATIENT)
Facility: HOSPITAL | Age: 62
LOS: 3 days | Discharge: HOME HEALTH | DRG: 417 | End: 2017-03-20
Attending: EMERGENCY MEDICINE | Admitting: INTERNAL MEDICINE
Payer: MEDICARE

## 2017-03-15 VITALS
TEMPERATURE: 96 F | HEART RATE: 87 BPM | DIASTOLIC BLOOD PRESSURE: 76 MMHG | WEIGHT: 206.38 LBS | BODY MASS INDEX: 34.38 KG/M2 | OXYGEN SATURATION: 95 % | HEIGHT: 65 IN | RESPIRATION RATE: 18 BRPM | SYSTOLIC BLOOD PRESSURE: 141 MMHG

## 2017-03-15 DIAGNOSIS — I25.9 CHRONIC ISCHEMIC HEART DISEASE: ICD-10-CM

## 2017-03-15 DIAGNOSIS — K80.20 CALCULUS OF GALLBLADDER WITHOUT CHOLECYSTITIS WITHOUT OBSTRUCTION: ICD-10-CM

## 2017-03-15 DIAGNOSIS — R10.11 RUQ ABDOMINAL PAIN: ICD-10-CM

## 2017-03-15 DIAGNOSIS — Z79.4 CONTROLLED TYPE 2 DIABETES MELLITUS WITH HYPERGLYCEMIA, WITH LONG-TERM CURRENT USE OF INSULIN: Primary | ICD-10-CM

## 2017-03-15 DIAGNOSIS — E11.65 TYPE 2 DIABETES MELLITUS WITH HYPERGLYCEMIA, WITH LONG-TERM CURRENT USE OF INSULIN: ICD-10-CM

## 2017-03-15 DIAGNOSIS — R94.39 ABNORMAL NUCLEAR STRESS TEST: ICD-10-CM

## 2017-03-15 DIAGNOSIS — N52.9 ERECTILE DYSFUNCTION, UNSPECIFIED ERECTILE DYSFUNCTION TYPE: ICD-10-CM

## 2017-03-15 DIAGNOSIS — K21.9 SLEEP RELATED GASTROESOPHAGEAL REFLUX DISEASE: ICD-10-CM

## 2017-03-15 DIAGNOSIS — E11.42 DIABETIC POLYNEUROPATHY ASSOCIATED WITH TYPE 2 DIABETES MELLITUS: ICD-10-CM

## 2017-03-15 DIAGNOSIS — G47.33 OBSTRUCTIVE SLEEP APNEA SYNDROME: ICD-10-CM

## 2017-03-15 DIAGNOSIS — T17.908A ASPIRATION INTO AIRWAY, INITIAL ENCOUNTER: ICD-10-CM

## 2017-03-15 DIAGNOSIS — I48.0 PAF (PAROXYSMAL ATRIAL FIBRILLATION): ICD-10-CM

## 2017-03-15 DIAGNOSIS — I25.10 CORONARY ARTERY DISEASE INVOLVING NATIVE CORONARY ARTERY OF NATIVE HEART WITHOUT ANGINA PECTORIS: ICD-10-CM

## 2017-03-15 DIAGNOSIS — M25.511 ACUTE PAIN OF RIGHT SHOULDER: ICD-10-CM

## 2017-03-15 DIAGNOSIS — I50.42 CHRONIC COMBINED SYSTOLIC AND DIASTOLIC CONGESTIVE HEART FAILURE: ICD-10-CM

## 2017-03-15 DIAGNOSIS — G47.21 DELAYED SLEEP PHASE SYNDROME: ICD-10-CM

## 2017-03-15 DIAGNOSIS — I10 ESSENTIAL HYPERTENSION: ICD-10-CM

## 2017-03-15 DIAGNOSIS — J45.41 MODERATE PERSISTENT ASTHMA WITH ACUTE EXACERBATION: ICD-10-CM

## 2017-03-15 DIAGNOSIS — R07.9 CHEST PAIN: ICD-10-CM

## 2017-03-15 DIAGNOSIS — Z79.4 TYPE 2 DIABETES MELLITUS WITH HYPERGLYCEMIA, WITH LONG-TERM CURRENT USE OF INSULIN: ICD-10-CM

## 2017-03-15 DIAGNOSIS — E11.65 CONTROLLED TYPE 2 DIABETES MELLITUS WITH HYPERGLYCEMIA, WITH LONG-TERM CURRENT USE OF INSULIN: Primary | ICD-10-CM

## 2017-03-15 DIAGNOSIS — F51.5 NIGHTMARES: ICD-10-CM

## 2017-03-15 DIAGNOSIS — F51.04 PSYCHOPHYSIOLOGICAL INSOMNIA: ICD-10-CM

## 2017-03-15 DIAGNOSIS — J81.0 FLASH PULMONARY EDEMA: ICD-10-CM

## 2017-03-15 DIAGNOSIS — J96.01 ACUTE RESPIRATORY FAILURE WITH HYPOXIA: ICD-10-CM

## 2017-03-15 DIAGNOSIS — E29.1 TESTICULAR HYPOGONADISM: ICD-10-CM

## 2017-03-15 DIAGNOSIS — R06.03 ACUTE RESPIRATORY DISTRESS: ICD-10-CM

## 2017-03-15 DIAGNOSIS — R79.89 ELEVATED TROPONIN: ICD-10-CM

## 2017-03-15 DIAGNOSIS — R07.9 CHEST PAIN, UNSPECIFIED TYPE: Primary | ICD-10-CM

## 2017-03-15 DIAGNOSIS — Z72.821 INADEQUATE SLEEP HYGIENE: ICD-10-CM

## 2017-03-15 DIAGNOSIS — R74.8 ELEVATED LIVER ENZYMES: ICD-10-CM

## 2017-03-15 LAB
ALBUMIN SERPL BCP-MCNC: 3.5 G/DL
ALP SERPL-CCNC: 76 U/L
ALT SERPL W/O P-5'-P-CCNC: 53 U/L
ANION GAP SERPL CALC-SCNC: 13 MMOL/L
APTT BLDCRRT: 24.5 SEC
AST SERPL-CCNC: 43 U/L
BASOPHILS # BLD AUTO: 0.01 K/UL
BASOPHILS NFR BLD: 0.1 %
BILIRUB SERPL-MCNC: 0.7 MG/DL
BUN SERPL-MCNC: 13 MG/DL
CALCIUM SERPL-MCNC: 8.7 MG/DL
CHLORIDE SERPL-SCNC: 105 MMOL/L
CK MB SERPL-MCNC: 2.9 NG/ML
CK MB SERPL-MCNC: 3.1 NG/ML
CK MB SERPL-RTO: 1.6 %
CK MB SERPL-RTO: 1.7 %
CK SERPL-CCNC: 174 U/L
CK SERPL-CCNC: 174 U/L
CK SERPL-CCNC: 200 U/L
CK SERPL-CCNC: 200 U/L
CO2 SERPL-SCNC: 20 MMOL/L
CREAT SERPL-MCNC: 0.9 MG/DL
DIFFERENTIAL METHOD: NORMAL
EOSINOPHIL # BLD AUTO: 0.1 K/UL
EOSINOPHIL NFR BLD: 0.7 %
ERYTHROCYTE [DISTWIDTH] IN BLOOD BY AUTOMATED COUNT: 13.3 %
EST. GFR  (AFRICAN AMERICAN): >60 ML/MIN/1.73 M^2
EST. GFR  (NON AFRICAN AMERICAN): >60 ML/MIN/1.73 M^2
GLUCOSE SERPL-MCNC: 161 MG/DL
HCT VFR BLD AUTO: 42.7 %
HGB BLD-MCNC: 14.1 G/DL
INR PPP: 1
LYMPHOCYTES # BLD AUTO: 2.4 K/UL
LYMPHOCYTES NFR BLD: 24.2 %
MCH RBC QN AUTO: 28.3 PG
MCHC RBC AUTO-ENTMCNC: 33 %
MCV RBC AUTO: 86 FL
MONOCYTES # BLD AUTO: 0.8 K/UL
MONOCYTES NFR BLD: 8.3 %
NEUTROPHILS # BLD AUTO: 6.7 K/UL
NEUTROPHILS NFR BLD: 66.4 %
PLATELET # BLD AUTO: 176 K/UL
PMV BLD AUTO: 11.3 FL
POCT GLUCOSE: 218 MG/DL (ref 70–110)
POCT GLUCOSE: 226 MG/DL (ref 70–110)
POTASSIUM SERPL-SCNC: 4.2 MMOL/L
PROT SERPL-MCNC: 7.9 G/DL
PROTHROMBIN TIME: 10.6 SEC
RBC # BLD AUTO: 4.99 M/UL
SODIUM SERPL-SCNC: 138 MMOL/L
TROPONIN I SERPL DL<=0.01 NG/ML-MCNC: 0.11 NG/ML
TROPONIN I SERPL DL<=0.01 NG/ML-MCNC: 0.12 NG/ML
WBC # BLD AUTO: 10.04 K/UL

## 2017-03-15 PROCEDURE — 80053 COMPREHEN METABOLIC PANEL: CPT

## 2017-03-15 PROCEDURE — 84484 ASSAY OF TROPONIN QUANT: CPT | Mod: 91

## 2017-03-15 PROCEDURE — 82962 GLUCOSE BLOOD TEST: CPT

## 2017-03-15 PROCEDURE — 99900035 HC TECH TIME PER 15 MIN (STAT): Performed by: GENERAL PRACTICE

## 2017-03-15 PROCEDURE — 96372 THER/PROPH/DIAG INJ SC/IM: CPT

## 2017-03-15 PROCEDURE — 27000221 HC OXYGEN, UP TO 24 HOURS: Performed by: GENERAL PRACTICE

## 2017-03-15 PROCEDURE — 4010F ACE/ARB THERAPY RXD/TAKEN: CPT | Mod: S$GLB,,, | Performed by: FAMILY MEDICINE

## 2017-03-15 PROCEDURE — 93010 ELECTROCARDIOGRAM REPORT: CPT | Mod: S$GLB,,, | Performed by: NUCLEAR MEDICINE

## 2017-03-15 PROCEDURE — 1160F RVW MEDS BY RX/DR IN RCRD: CPT | Mod: S$GLB,,, | Performed by: FAMILY MEDICINE

## 2017-03-15 PROCEDURE — 3046F HEMOGLOBIN A1C LEVEL >9.0%: CPT | Mod: S$GLB,,, | Performed by: FAMILY MEDICINE

## 2017-03-15 PROCEDURE — 96374 THER/PROPH/DIAG INJ IV PUSH: CPT

## 2017-03-15 PROCEDURE — 63600175 PHARM REV CODE 636 W HCPCS: Performed by: NURSE PRACTITIONER

## 2017-03-15 PROCEDURE — 93005 ELECTROCARDIOGRAM TRACING: CPT | Mod: S$GLB,,, | Performed by: FAMILY MEDICINE

## 2017-03-15 PROCEDURE — 85610 PROTHROMBIN TIME: CPT

## 2017-03-15 PROCEDURE — 99214 OFFICE O/P EST MOD 30 MIN: CPT | Mod: S$GLB,,, | Performed by: FAMILY MEDICINE

## 2017-03-15 PROCEDURE — 63600175 PHARM REV CODE 636 W HCPCS: Performed by: EMERGENCY MEDICINE

## 2017-03-15 PROCEDURE — 82553 CREATINE MB FRACTION: CPT | Mod: 91

## 2017-03-15 PROCEDURE — 85025 COMPLETE CBC W/AUTO DIFF WBC: CPT

## 2017-03-15 PROCEDURE — 3074F SYST BP LT 130 MM HG: CPT | Mod: S$GLB,,, | Performed by: FAMILY MEDICINE

## 2017-03-15 PROCEDURE — 99285 EMERGENCY DEPT VISIT HI MDM: CPT | Mod: 25

## 2017-03-15 PROCEDURE — 99999 PR PBB SHADOW E&M-EST. PATIENT-LVL III: CPT | Mod: PBBFAC,,, | Performed by: FAMILY MEDICINE

## 2017-03-15 PROCEDURE — 25000003 PHARM REV CODE 250: Performed by: EMERGENCY MEDICINE

## 2017-03-15 PROCEDURE — G0378 HOSPITAL OBSERVATION PER HR: HCPCS

## 2017-03-15 PROCEDURE — 25500020 PHARM REV CODE 255: Performed by: EMERGENCY MEDICINE

## 2017-03-15 PROCEDURE — 93005 ELECTROCARDIOGRAM TRACING: CPT | Performed by: GENERAL PRACTICE

## 2017-03-15 PROCEDURE — 85730 THROMBOPLASTIN TIME PARTIAL: CPT

## 2017-03-15 PROCEDURE — 3078F DIAST BP <80 MM HG: CPT | Mod: S$GLB,,, | Performed by: FAMILY MEDICINE

## 2017-03-15 PROCEDURE — 93010 ELECTROCARDIOGRAM REPORT: CPT | Mod: ,,, | Performed by: NUCLEAR MEDICINE

## 2017-03-15 RX ORDER — IBUPROFEN 200 MG
24 TABLET ORAL
Status: DISCONTINUED | OUTPATIENT
Start: 2017-03-15 | End: 2017-03-18 | Stop reason: SDUPTHER

## 2017-03-15 RX ORDER — LISINOPRIL 5 MG/1
5 TABLET ORAL DAILY
Status: DISCONTINUED | OUTPATIENT
Start: 2017-03-16 | End: 2017-03-16

## 2017-03-15 RX ORDER — IBUPROFEN 200 MG
16 TABLET ORAL
Status: DISCONTINUED | OUTPATIENT
Start: 2017-03-15 | End: 2017-03-18 | Stop reason: SDUPTHER

## 2017-03-15 RX ORDER — NITROGLYCERIN 0.4 MG/1
0.4 TABLET SUBLINGUAL EVERY 5 MIN PRN
Status: DISCONTINUED | OUTPATIENT
Start: 2017-03-15 | End: 2017-03-20 | Stop reason: HOSPADM

## 2017-03-15 RX ORDER — INSULIN ASPART 100 [IU]/ML
0-5 INJECTION, SOLUTION INTRAVENOUS; SUBCUTANEOUS
Status: DISCONTINUED | OUTPATIENT
Start: 2017-03-15 | End: 2017-03-18 | Stop reason: SDUPTHER

## 2017-03-15 RX ORDER — ENOXAPARIN SODIUM 100 MG/ML
1 INJECTION SUBCUTANEOUS ONCE
Status: COMPLETED | OUTPATIENT
Start: 2017-03-15 | End: 2017-03-15

## 2017-03-15 RX ORDER — GLUCAGON 1 MG
1 KIT INJECTION
Status: DISCONTINUED | OUTPATIENT
Start: 2017-03-15 | End: 2017-03-18

## 2017-03-15 RX ORDER — AMLODIPINE BESYLATE 10 MG/1
10 TABLET ORAL DAILY
Status: DISCONTINUED | OUTPATIENT
Start: 2017-03-16 | End: 2017-03-20 | Stop reason: HOSPADM

## 2017-03-15 RX ORDER — ONDANSETRON 2 MG/ML
4 INJECTION INTRAMUSCULAR; INTRAVENOUS ONCE
Status: COMPLETED | OUTPATIENT
Start: 2017-03-15 | End: 2017-03-15

## 2017-03-15 RX ORDER — PANTOPRAZOLE SODIUM 40 MG/1
40 TABLET, DELAYED RELEASE ORAL DAILY
Status: DISCONTINUED | OUTPATIENT
Start: 2017-03-16 | End: 2017-03-20 | Stop reason: HOSPADM

## 2017-03-15 RX ORDER — METOPROLOL SUCCINATE 50 MG/1
50 TABLET, EXTENDED RELEASE ORAL DAILY
Status: DISCONTINUED | OUTPATIENT
Start: 2017-03-16 | End: 2017-03-16

## 2017-03-15 RX ORDER — ENOXAPARIN SODIUM 100 MG/ML
40 INJECTION SUBCUTANEOUS EVERY 24 HOURS
Status: DISCONTINUED | OUTPATIENT
Start: 2017-03-16 | End: 2017-03-20 | Stop reason: HOSPADM

## 2017-03-15 RX ORDER — TIOTROPIUM BROMIDE 18 UG/1
18 CAPSULE ORAL; RESPIRATORY (INHALATION) DAILY
Status: DISCONTINUED | OUTPATIENT
Start: 2017-03-16 | End: 2017-03-16

## 2017-03-15 RX ORDER — NAPROXEN SODIUM 220 MG/1
81 TABLET, FILM COATED ORAL
Status: COMPLETED | OUTPATIENT
Start: 2017-03-15 | End: 2017-03-15

## 2017-03-15 RX ORDER — SERTRALINE HYDROCHLORIDE 50 MG/1
50 TABLET, FILM COATED ORAL DAILY
Status: DISCONTINUED | OUTPATIENT
Start: 2017-03-16 | End: 2017-03-20 | Stop reason: HOSPADM

## 2017-03-15 RX ORDER — ATORVASTATIN CALCIUM 40 MG/1
40 TABLET, FILM COATED ORAL NIGHTLY
Status: DISCONTINUED | OUTPATIENT
Start: 2017-03-15 | End: 2017-03-20 | Stop reason: HOSPADM

## 2017-03-15 RX ORDER — ASPIRIN 81 MG/1
81 TABLET ORAL DAILY
Status: DISCONTINUED | OUTPATIENT
Start: 2017-03-16 | End: 2017-03-17

## 2017-03-15 RX ORDER — GABAPENTIN 300 MG/1
300 CAPSULE ORAL 3 TIMES DAILY
Status: DISCONTINUED | OUTPATIENT
Start: 2017-03-16 | End: 2017-03-20 | Stop reason: HOSPADM

## 2017-03-15 RX ORDER — PANTOPRAZOLE SODIUM 40 MG/1
40 TABLET, DELAYED RELEASE ORAL DAILY
Status: DISCONTINUED | OUTPATIENT
Start: 2017-03-16 | End: 2017-03-16 | Stop reason: SDUPTHER

## 2017-03-15 RX ADMIN — ENOXAPARIN SODIUM 90 MG: 100 INJECTION SUBCUTANEOUS at 11:03

## 2017-03-15 RX ADMIN — ONDANSETRON 4 MG: 2 INJECTION INTRAMUSCULAR; INTRAVENOUS at 04:03

## 2017-03-15 RX ADMIN — ASPIRIN 81 MG 81 MG: 81 TABLET ORAL at 12:03

## 2017-03-15 RX ADMIN — IOHEXOL 100 ML: 350 INJECTION, SOLUTION INTRAVENOUS at 01:03

## 2017-03-15 RX ADMIN — INSULIN ASPART 1 UNITS: 100 INJECTION, SOLUTION INTRAVENOUS; SUBCUTANEOUS at 10:03

## 2017-03-15 RX ADMIN — NITROGLYCERIN 1 INCH: 20 OINTMENT TOPICAL at 12:03

## 2017-03-15 RX ADMIN — ENOXAPARIN SODIUM 90 MG: 100 INJECTION SUBCUTANEOUS at 02:03

## 2017-03-15 RX ADMIN — ATORVASTATIN CALCIUM 40 MG: 40 TABLET, FILM COATED ORAL at 09:03

## 2017-03-15 NOTE — MR AVS SNAPSHOT
[facility]  [address] [address]  Phone: [phone]                  Crow Green   3/15/2017 10:20 AM   Office Visit    Description:  Male : 1955   Provider:  Mateo Lambert DO   Department:  [facility]           Reason for Visit     Shoulder Pain           Diagnoses this Visit        Comments    Chest pain, unspecified type    -  Primary     Acute pain of right shoulder                To Do List           Future Appointments        Provider Department Dept Phone    2017 9:20 AM LABORATORY, [facility] [phone]    2017 9:40 AM SPECIMEN, [facility] [phone]    2017 10:20 AM Mateo Lambert DO [facility] Internal Medicine [phone]    2017 9:00 AM Eri Armstrong NP [facility] - Pulmonary Services [phone]    2017 8:40 AM Barb Veras DPM [facility] - Podiatry [phone]      Goals (5 Years of Data)     None      [facility]     [facility] Nurse Care [facility] -  Assistance  Registered nurses in the [facility] provide clinical advisement, health education, appointment booking, and other advisory services.  Call for this free service at [phone].             Medications                Verify that the below list of medications is an accurate representation of the medications you are currently taking.  If none reported, the list may be blank. If incorrect, please contact your healthcare provider. Carry this list with you in case of emergency.           Current Medications     albuterol 90 mcg/actuation inhaler Inhale 2 puffs into the lungs every 6 (six) hours as needed for Wheezing.    amlodipine (NORVASC) 10 MG tablet Take 1 tablet (10 mg total) by mouth once daily.    aspirin (ECOTRIN) 81 MG EC tablet Take 1 tablet (81 mg total) by mouth once daily.    atorvastatin (LIPITOR) 40 MG tablet Take 1 tablet (40 mg total) by  "mouth every evening.    blood sugar diagnostic Strp 1 strip by Misc.(Non-Drug; Combo Route) route 6 (six) times daily.    blood-glucose meter (CONTOUR NEXT EZ METER) kit Use as instructed    fluticasone-salmeterol 100-50 mcg/dose (ADVAIR DISKUS) 100-50 mcg/dose diskus inhaler Inhale 1 puff into the lungs 2 (two) times daily. Controller    gabapentin (NEURONTIN) 300 MG capsule Take 1 capsule (300 mg total) by mouth 3 (three) times daily.    insulin glargine (LANTUS SOLOSTAR) 100 unit/mL (3 mL) InPn pen Inject 50 Units into the skin every evening.    insulin lispro (HUMALOG KWIKPEN) 100 unit/mL InPn pen 10 units subcutaneously twice times a day 10-15 min before meals    insulin syringe-needle U-100 1 mL 31 gauge x 5/16 Syrg     latanoprost 0.005 % ophthalmic solution Place 1 drop into both eyes every evening.    lisinopril (PRINIVIL,ZESTRIL) 5 MG tablet Take 1 tablet (5 mg total) by mouth once daily.    metformin (GLUCOPHAGE) 1000 MG tablet Take 1 tablet (1,000 mg total) by mouth 2 (two) times daily with meals.    metoprolol succinate (TOPROL-XL) 50 MG 24 hr tablet TAKE 1 TABLET EVERY DAY    naproxen (EC NAPROSYN) 500 MG EC tablet TAKE 1 TABLET(500 MG) BY MOUTH TWICE DAILY    nitroGLYCERIN (NITROSTAT) 0.3 MG SL tablet Place 1 tablet (0.3 mg total) under the tongue every 5 (five) minutes as needed for Chest pain. No more than 3 tablets in one day.    pantoprazole (PROTONIX) 40 MG tablet Take 1 tablet (40 mg total) by mouth once daily.    pen needle, diabetic 31 gauge x 5/16" Ndle Inject 1 pen into the skin 2 (two) times daily. Use one 2 times a day    sertraline (ZOLOFT) 50 MG tablet Take 1 tablet (50 mg total) by mouth once daily.    tiotropium (SPIRIVA WITH HANDIHALER) 18 mcg inhalation capsule Inhale 1 capsule (18 mcg total) into the lungs once daily. Controller           Clinical Reference Information           Your Vitals Were     BP Pulse Temp Resp    141/76 (BP Location: Left arm, Patient Position: Sitting, BP " "Method: Automatic) 87 95.7 °F (35.4 °C) (Tympanic) 18    Height Weight SpO2 BMI    5' 5" (1.651 m) 93.6 kg (206 lb 5.6 oz) 95% 34.34 kg/m2      Blood Pressure          Most Recent Value    BP  (!)  141/76      Allergies as of 3/15/2017     No Known Allergies      Immunizations Administered on Date of Encounter - 3/15/2017     None      Orders Placed During Today's Visit      Normal Orders This Visit    IN OFFICE EKG 12-LEAD (to Muse)       Language Assistance Services     ATTENTION: Language assistance services are available, free of charge. Please call [phone].      ATENCIÓN: Si habla julien, tiene a garcia disposición servicios gratuitos de asistencia lingüística. Llame al [phone].     CHÚ Ý: N?u b?n nói Ti?ng Vi?t, có các d?ch v? h? tr? ngôn ng? mi?n phí dành cho b?n. G?i s? [phone].         [facility] complies with applicable Federal civil rights laws and does not discriminate on the basis of race, color, national origin, age, disability, or sex.        "

## 2017-03-15 NOTE — PROGRESS NOTES
Subjective:       Patient ID: Crow Green is a 61 y.o. male.    Chief Complaint: Shoulder Pain (rt)    HPI  Crow is here today complaining of new onset of sternal chest pain and right shoulder pain.  He reports that his shoulder started hurting him on Saturday night and awoke him from sleep.  Around the same time he started having sternal chest pain that he has a difficult time describing in character.  He did take some nitroglycerin which helped relieve the chest pain temporarily but he had to do this several different times over the last few days to help at all.  He would've come in sooner on Monday but has transportation challenges.  He reports that his shoulder pain is worse whenever he takes a big breath or if he tries to AB duct his right shoulder.  He does have some shortness of breath but denies any nausea and denies diaphoresis.  His history is significant for uncontrolled diabetes and history of coronary artery disease with NSTEMI late last year.    Family History   Problem Relation Age of Onset    Glaucoma Mother     Cataracts Mother     Cataracts Father     Glaucoma Sister     Cataracts Sister     Glaucoma Maternal Aunt     Prostate cancer Neg Hx        Current Outpatient Prescriptions:     albuterol 90 mcg/actuation inhaler, Inhale 2 puffs into the lungs every 6 (six) hours as needed for Wheezing., Disp: 18 g, Rfl: 11    amlodipine (NORVASC) 10 MG tablet, Take 1 tablet (10 mg total) by mouth once daily., Disp: 90 tablet, Rfl: 3    aspirin (ECOTRIN) 81 MG EC tablet, Take 1 tablet (81 mg total) by mouth once daily., Disp: , Rfl: 0    atorvastatin (LIPITOR) 40 MG tablet, Take 1 tablet (40 mg total) by mouth every evening., Disp: 90 tablet, Rfl: 3    blood sugar diagnostic Strp, 1 strip by Misc.(Non-Drug; Combo Route) route 6 (six) times daily., Disp: 160 strip, Rfl: 11    blood-glucose meter (CONTOUR NEXT EZ METER) kit, Use as instructed, Disp: 1 each, Rfl: 0     "fluticasone-salmeterol 100-50 mcg/dose (ADVAIR DISKUS) 100-50 mcg/dose diskus inhaler, Inhale 1 puff into the lungs 2 (two) times daily. Controller, Disp: 180 each, Rfl: 3    gabapentin (NEURONTIN) 300 MG capsule, Take 1 capsule (300 mg total) by mouth 3 (three) times daily., Disp: 270 capsule, Rfl: 3    insulin glargine (LANTUS SOLOSTAR) 100 unit/mL (3 mL) InPn pen, Inject 50 Units into the skin every evening., Disp: 2 Box, Rfl: 11    insulin lispro (HUMALOG KWIKPEN) 100 unit/mL InPn pen, 10 units subcutaneously twice times a day 10-15 min before meals, Disp: 1 Box, Rfl: 11    insulin syringe-needle U-100 1 mL 31 gauge x 5/16 Syrg, , Disp: , Rfl:     latanoprost 0.005 % ophthalmic solution, Place 1 drop into both eyes every evening., Disp: 1 Bottle, Rfl: 4    lisinopril (PRINIVIL,ZESTRIL) 5 MG tablet, Take 1 tablet (5 mg total) by mouth once daily., Disp: 90 tablet, Rfl: 2    metformin (GLUCOPHAGE) 1000 MG tablet, Take 1 tablet (1,000 mg total) by mouth 2 (two) times daily with meals., Disp: 180 tablet, Rfl: 3    metoprolol succinate (TOPROL-XL) 50 MG 24 hr tablet, TAKE 1 TABLET EVERY DAY, Disp: 30 tablet, Rfl: 5    naproxen (EC NAPROSYN) 500 MG EC tablet, TAKE 1 TABLET(500 MG) BY MOUTH TWICE DAILY, Disp: 14 tablet, Rfl: 0    nitroGLYCERIN (NITROSTAT) 0.3 MG SL tablet, Place 1 tablet (0.3 mg total) under the tongue every 5 (five) minutes as needed for Chest pain. No more than 3 tablets in one day., Disp: 30 tablet, Rfl: 1    pantoprazole (PROTONIX) 40 MG tablet, Take 1 tablet (40 mg total) by mouth once daily., Disp: 30 tablet, Rfl: 11    pen needle, diabetic 31 gauge x 5/16" Ndle, Inject 1 pen into the skin 2 (two) times daily. Use one 2 times a day, Disp: 100 each, Rfl: 3    sertraline (ZOLOFT) 50 MG tablet, Take 1 tablet (50 mg total) by mouth once daily., Disp: 90 tablet, Rfl: 3    tiotropium (SPIRIVA WITH HANDIHALER) 18 mcg inhalation capsule, Inhale 1 capsule (18 mcg total) into the lungs once " "daily. Controller, Disp: 90 capsule, Rfl: 3    Review of Systems   Constitutional: Negative for fatigue and fever.   Respiratory: Positive for shortness of breath.    Cardiovascular: Positive for chest pain.   Gastrointestinal: Negative for abdominal pain.   Musculoskeletal: Positive for arthralgias.   Skin: Negative for rash.   Neurological: Negative for dizziness and headaches.       Objective:   BP (!) 141/76 (BP Location: Left arm, Patient Position: Sitting, BP Method: Automatic)  Pulse 87  Temp (!) 95.7 °F (35.4 °C) (Tympanic)   Resp 18  Ht 5' 5" (1.651 m)  Wt 93.6 kg (206 lb 5.6 oz)  SpO2 95%  BMI 34.34 kg/m2     Physical Exam   Constitutional: He is oriented to person, place, and time. He appears well-developed and well-nourished.   HENT:   Head: Normocephalic and atraumatic.   Eyes: Conjunctivae are normal.   Cardiovascular: Normal rate.  An irregular rhythm present.  Occasional extrasystoles are present.   No murmur heard.  Pulmonary/Chest: Effort normal. No respiratory distress.   Musculoskeletal: He exhibits no edema.        Right shoulder: He exhibits decreased range of motion and tenderness. He exhibits no swelling and no crepitus.   Increased pain with abduction of right shoulder.  He also had reported pain whenever he took a deep breath       Neurological: He is alert and oriented to person, place, and time. Coordination normal.   Skin: Skin is warm and dry. No rash noted.   Psychiatric: He has a normal mood and affect. His behavior is normal.   Vitals reviewed.      Assessment & Plan     1. Chest pain, unspecified type  Although his symptoms are somewhat atypical for angina, I am concerned due to his history of coronary artery disease with past NSTEMI and the fact that his pain is relieved somewhat with nitroglycerin.  His EKG does not look very much different from the past when that he had.  - IN OFFICE EKG 12-LEAD (to [facility])    2. Acute pain of right shoulder  And I really think that his " right shoulder pain and chest pain are connected although they started around the same time.  Likely secondary to arthritis but also would want to get a chest x-ray to rule out referred pain from a pulmonary process.    3. 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, with long-term current use of insulin  Reports having his glucose readings recently in the 300s.  Prior to this he was having pretty good control with the use of insulin or medications.    4. Coronary artery disease involving native coronary artery of native heart without angina pectoris  Sending to the emergency room for his history and current presentation with chest pain.

## 2017-03-15 NOTE — MEDICAL/APP STUDENT
"Subjective:       Patient ID: Crow Green is a 61 y.o. male.    Chief Complaint: Shoulder Pain (rt)    HPI     60 yo AA M presenting alone for c/o right shoulder pain.  Pain onset on Saturday 3/11 while lying down.  He does not recall an injury.  He has had similar pain in the past and was diagnosed with arthritis.  In the past he was treated with pain medications.  Pain increases with movement.  He has tried tylenol OTC , heat compress, and ice for the pain, but had minimal relief.       SOB: reports worsening chronic SOB over the past few days.  Reports feeling SOB even while sitting, mild worsening with activity.  Has been using albuterol (last used 3/14), spiriva, and advair with some relief.  He reports using c-pap machine at night.      Chest pain: mid-sternal CP rated at 3-4/10; reports taking nitro (3 occasison) over past few days to relieve pain.  Also reports pain relief with drinking water.  Associated symptoms of headache (not present at this time)    Denies n/v.    Hx of Diabetes:  Reports blood sugars have been "up and down" since illness onset -- running into the 300s. He is currently taking metformin, lantus, and humalog as directed.       Family History   Problem Relation Age of Onset    Glaucoma Mother     Cataracts Mother     Cataracts Father     Glaucoma Sister     Cataracts Sister     Glaucoma Maternal Aunt     Prostate cancer Neg Hx        Current Outpatient Prescriptions:     albuterol 90 mcg/actuation inhaler, Inhale 2 puffs into the lungs every 6 (six) hours as needed for Wheezing., Disp: 18 g, Rfl: 11    amlodipine (NORVASC) 10 MG tablet, Take 1 tablet (10 mg total) by mouth once daily., Disp: 90 tablet, Rfl: 3    aspirin (ECOTRIN) 81 MG EC tablet, Take 1 tablet (81 mg total) by mouth once daily., Disp: , Rfl: 0    atorvastatin (LIPITOR) 40 MG tablet, Take 1 tablet (40 mg total) by mouth every evening., Disp: 90 tablet, Rfl: 3    blood sugar diagnostic Strp, 1 strip by " "Misc.(Non-Drug; Combo Route) route 6 (six) times daily., Disp: 160 strip, Rfl: 11    blood-glucose meter (CONTOUR NEXT EZ METER) kit, Use as instructed, Disp: 1 each, Rfl: 0    fluticasone-salmeterol 100-50 mcg/dose (ADVAIR DISKUS) 100-50 mcg/dose diskus inhaler, Inhale 1 puff into the lungs 2 (two) times daily. Controller, Disp: 180 each, Rfl: 3    gabapentin (NEURONTIN) 300 MG capsule, Take 1 capsule (300 mg total) by mouth 3 (three) times daily., Disp: 270 capsule, Rfl: 3    insulin glargine (LANTUS SOLOSTAR) 100 unit/mL (3 mL) InPn pen, Inject 50 Units into the skin every evening., Disp: 2 Box, Rfl: 11    insulin lispro (HUMALOG KWIKPEN) 100 unit/mL InPn pen, 10 units subcutaneously twice times a day 10-15 min before meals, Disp: 1 Box, Rfl: 11    insulin syringe-needle U-100 1 mL 31 gauge x 5/16 Syrg, , Disp: , Rfl:     latanoprost 0.005 % ophthalmic solution, Place 1 drop into both eyes every evening., Disp: 1 Bottle, Rfl: 4    lisinopril (PRINIVIL,ZESTRIL) 5 MG tablet, Take 1 tablet (5 mg total) by mouth once daily., Disp: 90 tablet, Rfl: 2    metformin (GLUCOPHAGE) 1000 MG tablet, Take 1 tablet (1,000 mg total) by mouth 2 (two) times daily with meals., Disp: 180 tablet, Rfl: 3    metoprolol succinate (TOPROL-XL) 50 MG 24 hr tablet, TAKE 1 TABLET EVERY DAY, Disp: 30 tablet, Rfl: 5    naproxen (EC NAPROSYN) 500 MG EC tablet, TAKE 1 TABLET(500 MG) BY MOUTH TWICE DAILY, Disp: 14 tablet, Rfl: 0    nitroGLYCERIN (NITROSTAT) 0.3 MG SL tablet, Place 1 tablet (0.3 mg total) under the tongue every 5 (five) minutes as needed for Chest pain. No more than 3 tablets in one day., Disp: 30 tablet, Rfl: 1    pantoprazole (PROTONIX) 40 MG tablet, Take 1 tablet (40 mg total) by mouth once daily., Disp: 30 tablet, Rfl: 11    pen needle, diabetic 31 gauge x 5/16" Ndle, Inject 1 pen into the skin 2 (two) times daily. Use one 2 times a day, Disp: 100 each, Rfl: 3    sertraline (ZOLOFT) 50 MG tablet, Take 1 tablet " "(50 mg total) by mouth once daily., Disp: 90 tablet, Rfl: 3    tiotropium (SPIRIVA WITH HANDIHALER) 18 mcg inhalation capsule, Inhale 1 capsule (18 mcg total) into the lungs once daily. Controller, Disp: 90 capsule, Rfl: 3    Review of Systems   Respiratory: Positive for shortness of breath.    Cardiovascular: Positive for chest pain.   Gastrointestinal: Negative for abdominal pain, diarrhea, nausea and vomiting.   Musculoskeletal: Positive for arthralgias.   Neurological: Positive for headaches.       Objective:   BP (!) 141/76 (BP Location: Left arm, Patient Position: Sitting, BP Method: Automatic)  Pulse 87  Temp (!) 95.7 °F (35.4 °C) (Tympanic)   Resp 18  Ht 5' 5" (1.651 m)  Wt 93.6 kg (206 lb 5.6 oz)  SpO2 95%  BMI 34.34 kg/m2     Physical Exam   Constitutional: He is oriented to person, place, and time. He appears well-developed and well-nourished.   Neck: No tracheal deviation present.   Cardiovascular: Normal rate, regular rhythm and normal heart sounds.    No murmur heard.  Pulmonary/Chest: Effort normal and breath sounds normal. He has no wheezes.   Abdominal: Soft. Bowel sounds are normal. There is no tenderness.   Musculoskeletal:        Right shoulder: He exhibits decreased range of motion, tenderness, bony tenderness and pain. He exhibits no swelling, no effusion and no crepitus.   Pt reports inability to perform active ROM with RUE; passive ROM intact, but painful. +2 right radial pulse noted   Neurological: He is alert and oriented to person, place, and time.   Skin: He is not diaphoretic.       Assessment & Plan     EKG in office  Send to ED for further evaluation      "
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
## 2017-03-15 NOTE — IP AVS SNAPSHOT
[address] [place] [address]           Patient Discharge Instructions     Our goal is to set you up for success. This packet includes information on your condition, medications, and your home care. It will help you to care for yourself so you don't get sicker and need to go back to the hospital.     Please ask your nurse if you have any questions.        There are many details to remember when preparing to leave the hospital. Here is what you will need to do:    1. Take your medicine. If you are prescribed medications, review your Medication List in the following pages. You may have new medications to  at the pharmacy and others that you'll need to stop taking. Review the instructions for how and when to take your medications. Talk with your doctor or nurses if you are unsure of what to do.     2. Go to your follow-up appointments. Specific follow-up information is listed in the following pages. Your may be contacted by a transition nurse or clinical provider about future appointments. Be sure we have all of the phone numbers to reach you, if needed. Please contact your provider's office if you are unable to make an appointment.     3. Watch for warning signs. Your doctor or nurse will give you detailed warning signs to watch for and when to call for assistance. These instructions may also include educational information about your condition. If you experience any of warning signs to your health, call your doctor.               ** Verify the list of medication(s) below is accurate and up to date. Carry this with you in case of emergency. If your medications have changed, please notify your healthcare provider.             Medication List      START taking these medications        Additional Info                      amiodarone 400 MG tablet   Commonly known as:  PACERONE   Quantity:  30 tablet   Refills:  1   Dose:  400 mg    Last time this was given:  400 mg  on 3/20/2017 10:21 AM   Instructions:  Take 1 tablet (400 mg total) by mouth once daily.     Begin Date    AM    Noon    PM    Bedtime       apixaban 5 mg Tab   Quantity:  60 tablet   Refills:  1   Dose:  5 mg    Instructions:  Take 1 tablet (5 mg total) by mouth 2 (two) times daily.     Begin Date    AM    Noon    PM    Bedtime       hydrocodone-acetaminophen 5-325mg 5-325 mg per tablet   Commonly known as:  NORCO   Quantity:  20 tablet   Refills:  0   Dose:  1 tablet    Last time this was given:  1 tablet on 3/19/2017  7:52 PM   Instructions:  Take 1 tablet by mouth every 6 (six) hours as needed for Pain.     Begin Date    AM    Noon    PM    Bedtime         CONTINUE taking these medications        Additional Info                      albuterol 90 mcg/actuation inhaler   Quantity:  18 g   Refills:  11   Dose:  2 puff    Last time this was given:  2 puffs on 3/17/2017  3:10 PM   Instructions:  Inhale 2 puffs into the lungs every 6 (six) hours as needed for Wheezing.     Begin Date    AM    Noon    PM    Bedtime       amlodipine 10 MG tablet   Commonly known as:  NORVASC   Quantity:  90 tablet   Refills:  3   Dose:  10 mg    Last time this was given:  10 mg on 3/20/2017 10:22 AM   Instructions:  Take 1 tablet (10 mg total) by mouth once daily.     Begin Date    AM    Noon    PM    Bedtime       aspirin 81 MG EC tablet   Commonly known as:  ECOTRIN   Refills:  0   Dose:  81 mg    Last time this was given:  81 mg on 3/17/2017  9:42 AM   Instructions:  Take 1 tablet (81 mg total) by mouth once daily.     Begin Date    AM    Noon    PM    Bedtime       atorvastatin 40 MG tablet   Commonly known as:  LIPITOR   Quantity:  90 tablet   Refills:  3   Dose:  40 mg    Last time this was given:  40 mg on 3/19/2017  9:44 PM   Instructions:  Take 1 tablet (40 mg total) by mouth every evening.     Begin Date    AM    Noon    PM    Bedtime       blood sugar diagnostic Strp   Quantity:  160 strip   Refills:  11   Dose:  1 strip    Indications:  Uncontrolled Diabetes Mellitus   Comments:  Patient uses Contour Next strips. He is on intensive insulin therapy and it is medically necessary to check before meals and 2 hour hours after meals as directed.    Instructions:  1 strip by Misc.(Non-Drug; Combo Route) route 6 (six) times daily.     Begin Date    AM    Noon    PM    Bedtime       blood-glucose meter kit   Commonly known as:  CONTOUR NEXT EZ METER   Quantity:  1 each   Refills:  0    Instructions:  Use as instructed     Begin Date    AM    Noon    PM    Bedtime       fluticasone-salmeterol 100-50 mcg/dose 100-50 mcg/dose diskus inhaler   Commonly known as:  ADVAIR DISKUS   Quantity:  180 each   Refills:  3   Dose:  1 puff    Instructions:  Inhale 1 puff into the lungs 2 (two) times daily. Controller     Begin Date    AM    Noon    PM    Bedtime       gabapentin 300 MG capsule   Commonly known as:  NEURONTIN   Quantity:  270 capsule   Refills:  3   Dose:  300 mg    Last time this was given:  300 mg on 3/20/2017  6:11 AM   Instructions:  Take 1 capsule (300 mg total) by mouth 3 (three) times daily.     Begin Date    AM    Noon    PM    Bedtime       insulin glargine 100 unit/mL (3 mL) Inpn pen   Commonly known as:  LANTUS SOLOSTAR   Quantity:  2 Box   Refills:  11   Dose:  50 Units    Instructions:  Inject 50 Units into the skin every evening.     Begin Date    AM    Noon    PM    Bedtime       insulin lispro 100 unit/mL Inpn pen   Commonly known as:  HUMALOG KWIKPEN   Quantity:  1 Box   Refills:  11    Instructions:  10 units subcutaneously twice times a day 10-15 min before meals     Begin Date    AM    Noon    PM    Bedtime       insulin syringe-needle U-100 1 mL 31 gauge x 5/16 Syrg   Refills:  0      Begin Date    AM    Noon    PM    Bedtime       latanoprost 0.005 % ophthalmic solution   Quantity:  1 Bottle   Refills:  4   Dose:  1 drop    Last time this was given:  1 drop on 3/19/2017 10:00 PM   Instructions:  Place 1 drop into both  "eyes every evening.     Begin Date    AM    Noon    PM    Bedtime       lisinopril 5 MG tablet   Commonly known as:  PRINIVIL,ZESTRIL   Quantity:  90 tablet   Refills:  2   Dose:  5 mg    Last time this was given:  10 mg on 3/20/2017 10:21 AM   Instructions:  Take 1 tablet (5 mg total) by mouth once daily.     Begin Date    AM    Noon    PM    Bedtime       metformin 1000 MG tablet   Commonly known as:  GLUCOPHAGE   Quantity:  180 tablet   Refills:  3   Dose:  1000 mg   Indications:  type 2 diabetes mellitus    Instructions:  Take 1 tablet (1,000 mg total) by mouth 2 (two) times daily with meals.     Begin Date    AM    Noon    PM    Bedtime       metoprolol succinate 50 MG 24 hr tablet   Commonly known as:  TOPROL-XL   Quantity:  30 tablet   Refills:  5    Last time this was given:  100 mg on 3/20/2017 10:21 AM   Instructions:  TAKE 1 TABLET EVERY DAY     Begin Date    AM    Noon    PM    Bedtime       nitroGLYCERIN 0.3 MG SL tablet   Commonly known as:  NITROSTAT   Quantity:  30 tablet   Refills:  1   Dose:  0.3 mg    Last time this was given:  0.4 mg on 3/18/2017  1:32 PM   Instructions:  Place 1 tablet (0.3 mg total) under the tongue every 5 (five) minutes as needed for Chest pain. No more than 3 tablets in one day.     Begin Date    AM    Noon    PM    Bedtime       pantoprazole 40 MG tablet   Commonly known as:  PROTONIX   Quantity:  30 tablet   Refills:  11   Dose:  40 mg    Last time this was given:  40 mg on 3/20/2017 10:22 AM   Instructions:  Take 1 tablet (40 mg total) by mouth once daily.     Begin Date    AM    Noon    PM    Bedtime       pen needle, diabetic 31 gauge x 5/16" Ndle   Quantity:  100 each   Refills:  3   Dose:  1 pen    Instructions:  Inject 1 pen into the skin 2 (two) times daily. Use one 2 times a day     Begin Date    AM    Noon    PM    Bedtime       sertraline 50 MG tablet   Commonly known as:  ZOLOFT   Quantity:  90 tablet   Refills:  3   Dose:  50 mg    Last time this was given:  50 " mg on 3/20/2017 10:22 AM   Instructions:  Take 1 tablet (50 mg total) by mouth once daily.     Begin Date    AM    Noon    PM    Bedtime       tiotropium 18 mcg inhalation capsule   Commonly known as:  SPIRIVA WITH HANDIHALER   Quantity:  90 capsule   Refills:  3   Dose:  18 mcg    Instructions:  Inhale 1 capsule (18 mcg total) into the lungs once daily. Controller     Begin Date    AM    Noon    PM    Bedtime         STOP taking these medications     naproxen 500 MG EC tablet   Commonly known as:  EC NAPROSYN            Where to Get Your Medications      These medications were sent to [facility] - [place], [place] - [address] AT [place] [facility]  [address], [place] [address]     Phone:  [phone]     amiodarone 400 MG tablet    apixaban 5 mg Tab    hydrocodone-acetaminophen 5-325mg 5-325 mg per tablet                  Please bring to all follow up appointments:    1. A copy of your discharge instructions.  2. All medicines you are currently taking in their original bottles.  3. Identification and insurance card.    Please arrive 15 minutes ahead of scheduled appointment time.    Please call 24 hours in advance if you must reschedule your appointment and/or time.        Your Scheduled Appointments     Apr 17, 2017  9:20 AM CDT   Fasting Lab with LABORAT[facility], [facility] ([facility])    [address] [place] [address] [phone]            Apr 17, 2017  9:40 AM CDT   Urine with SPECIMEN, [facility]    [address] [facility] [address] [facility] [address]  [place] [address] [phone]            Apr 17, 2017 10:20 AM CDT   Established Patient Visit with DO Ignacia Prattille - Internal Medicine ([facility])    [address] [place] [address] [phone]            Apr 18, 2017  9:00 AM CDT   Established Patient Visit with Eri Armstrong NP   [facility] - Pulmonary Services ([facility])    [address]  [place] [address]   [phone]            Apr 19, 2017  9:30 AM CDT   Established Patient Visit with Selene Fishman Jr., PA-C   [facility] - Diabetes Management ([facility])    [address] [place] [address]   [phone]              Follow-up Information     Follow up with Louis O. Jeansonne, MD. Schedule an appointment as soon as possible for a visit today.    Specialty:  General Surgery    Contact information:    [address] [place] [address] [place] [address]  [phone]          Follow up with Mateo Lambert DO. Schedule an appointment as soon as possible for a visit in 1 week.    Specialty:  Family Medicine    Why:  and , If symptoms worsen    Contact information:    [address] [address] [place] [address]  [phone]          Follow up with JEWELS Luna. Schedule an appointment as soon as possible for a visit in 1 week.    Specialty:  Cardiology    Contact information:    [address] [place] [address] [place] [address]  [phone]          Discharge Instructions     Future Orders    Activity as tolerated     Diet general     Questions:    Total calories:      Fat restriction, if any:      Protein restriction, if any:      Na restriction, if any:  2gNa    Fluid restriction:      Additional restrictions:  Diabetic 1800        Primary Diagnosis     Your primary diagnosis was:  Calculus Of Gallbladder And Bile Duct With Acute On Chronic Cholecystitis      Admission Information     Date & Time Provider Department [facility]    3/15/2017 11:05 AM Car Pineda MD [facility] [id number]      Care Providers     Provider Role Specialty Primary office phone    Car Pindea MD Attending Provider General Practice [phone]    Justice Cai MD Consulting Physician  Internal Medicine [phone]    Car Pineda MD Team Attending  General Practice [phone]    Torin Bishop MD Consulting Physician  General Surgery  [phone]    Louis O. Jeansonne IV, MD Consulting Physician  General  "Surgery  [phone]    Torin Bishop MD Surgeon  General Surgery [phone]    Louis O. Jeansonne IV, MD Surgeon  General Surgery [phone]    Juancho Alicea MD Consulting Physician  Pulmonary Disease [phone]      Your Vitals Were     BP Pulse Temp Resp Height Weight    135/98 90 98.7 °F (37.1 °C) (Oral) 20 5' 5" (1.651 m) 95 kg (209 lb 7 oz)    SpO2 BMI             95% 34.85 kg/m2         Recent Lab Values        12/4/2014 11/15/2016 11/16/2016 12/7/2016 3/16/2017              10:29 AM  9:35 PM  5:19 AM 12:05 PM 12:21 AM       A1C 10.6 (H) 13.0 (H) 12.9 (H) 11.7 (H) 8.8 (H)       Comment for A1C at  9:35 PM on 11/15/2016:  According to ADA guidelines, hemoglobin A1C <7.0% represents  optimal control in non-pregnant diabetic patients.  Different  metrics may apply to specific populations.   Standards of Medical Care in Diabetes - 2016.  For the purpose of screening for the presence of diabetes:  <5.7%     Consistent with the absence of diabetes  5.7-6.4%  Consistent with increasing risk for diabetes   (prediabetes)  >or=6.5%  Consistent with diabetes  Currently no consensus exists for use of hemoglobin A1C  for diagnosis of diabetes for children.      Comment for A1C at  5:19 AM on 11/16/2016:  According to ADA guidelines, hemoglobin A1C <7.0% represents  optimal control in non-pregnant diabetic patients.  Different  metrics may apply to specific populations.   Standards of Medical Care in Diabetes - 2016.  For the purpose of screening for the presence of diabetes:  <5.7%     Consistent with the absence of diabetes  5.7-6.4%  Consistent with increasing risk for diabetes   (prediabetes)  >or=6.5%  Consistent with diabetes  Currently no consensus exists for use of hemoglobin A1C  for diagnosis of diabetes for children.      Comment for A1C at 12:05 PM on 12/7/2016:  According to ADA guidelines, hemoglobin A1C <7.0% represents  optimal control in non-pregnant diabetic patients.  Different  metrics may " apply to specific populations.   Standards of Medical Care in Diabetes - 2016.  For the purpose of screening for the presence of diabetes:  <5.7%     Consistent with the absence of diabetes  5.7-6.4%  Consistent with increasing risk for diabetes   (prediabetes)  >or=6.5%  Consistent with diabetes  Currently no consensus exists for use of hemoglobin A1C  for diagnosis of diabetes for children.      Comment for A1C at 12:21 AM on 3/16/2017:  According to ADA guidelines, hemoglobin A1C <7.0% represents  optimal control in non-pregnant diabetic patients.  Different  metrics may apply to specific populations.   Standards of Medical Care in Diabetes - 2016.  For the purpose of screening for the presence of diabetes:  <5.7%     Consistent with the absence of diabetes  5.7-6.4%  Consistent with increasing risk for diabetes   (prediabetes)  >or=6.5%  Consistent with diabetes  Currently no consensus exists for use of hemoglobin A1C  for diagnosis of diabetes for children.        Pending Labs     Order Current Status    Specimen to Pathology - Surgery In process      Allergies as of 3/20/2017     No Known Allergies      [facility]     [facility] Nurse Care Line - 24/7 Assistance  Unless otherwise directed by your provider, please contact [facility], our nurse care line that is available for 24/7 assistance.     Registered nurses in the [facility] provide clinical advisement, health education, appointment booking, and other advisory services.  Call for this free service at [phone].        Advance Directives     An advance directive is a document which, in the event you are no longer able to make decisions for yourself, tells your healthcare team what kind of treatment you do or do not want to receive, or who you would like to make those decisions for you.  If you do not currently have an advance directive, [facility] encourages you to create one.  For more information call:  [phone]  [phone]), [phone] ([phone]),  or log on to [email or web address].        Language Assistance Services     ATTENTION: Language assistance services are available, free of charge. Please call [phone].      ATENCIÓN: Si patriciala julien, tiene a garcia disposición servicios gratuitos de asistencia lingüística. Llame al [phone].     CHÚ Ý: N?u b?n nói Ti?ng Vi?t, có các d?ch v? h? tr? ngôn ng? mi?n phí dành cho b?n. G?i s? [phone].        Heart Failure Education       Heart Failure: Being Active  You have a condition called heart failure. Being active doesnt mean that you have to wear yourself out. Even a little movement each day helps to strengthen your heart. If you cant get out to exercise, you can do simple stretching and strengthening exercises at home. These are good ways to keep you well-conditioned and prevent you and your heart from becoming excessively weak.    Ideas to get you started  · Add a little movement to things you do now. Walk to mail letters. Park your car at the far end of the parking lot and walk to the store. Walk up a flight of stairs instead of taking the elevator.  · Choose activities you enjoy. You might walk, swim, or ride an exercise bike. Things like gardening and washing the car count, too. Other possibilities include: washing dishes, walking the dog, walking around the mall, and doing aerobic activities with friends.  · Join a group exercise program at a [facility] or [facility], a senior center, or a community center. Or look into a hospital cardiac rehabilitation program. Ask your doctor if you qualify.  Tips to keep you going  · Get up and get dressed each day. Go to a coffee shop and read a newspaper or go somewhere that you'll be in the presence of other active people. Youll feel more like being active.  · Make a plan. Choose one or more activities that you enjoy and that you can easily do. Then plan to do at least one each day. You might write your plan on a  calendar.  · Go with a friend or a group if you like company. This can help you feel supported and stay motivated, too.  · Plan social events that you enjoy. This will keep you mentally engaged as well as physically motivated to do things you find pleasure in.  For your safety  · Talk with your healthcare provider before starting an exercise program.  · Exercise indoors when its too hot or too cold outside, or when the air quality is poor. Try walking at a shopping mall.  · Wear socks and sturdy shoes to maintain your balance and prevent falls.  · Start slowly. Do a few minutes several times a day at first. Increase your time and speed little by little.  · Stop and rest whenever you feel tired or get short of breath.  · Dont push yourself on days when you dont feel well.  Date Last Reviewed: 3/20/2016  © 9423-9595 [employer]. [address] [place] [address]. All rights reserved. This information is not intended as a substitute for professional medical care. Always follow your healthcare professional's instructions.              Heart Failure: Evaluating Your Heart  You have a condition called heart failure. To evaluate your condition, your doctor will examine you, ask questions, and do some tests. Along with looking for signs of heart failure, the doctor looks for any other health problems that may have led to heart failure. The results of your evaluation will help your doctor form a treatment plan.  Health history and physical exam  Your visit will start with a health history. Tell the doctor about any symptoms youve noticed and about all medicines you take. Then youll have a physical exam. This includes listening to your heartbeat and breathing. Youll also be checked for swelling (edema) in your legs and neck. When you have fluid buildup or fluid in the lungs, it may be called congestive heart failure.  Diagnosing heart failure     During an echocardiogram, sound waves bounce off the  heart. These are converted into a picture on the screen.   The following may be done to help your doctor form a diagnosis:  · X-rays show the size and shape of your heart. These pictures can also show fluid in your lungs.  · An electrocardiogram (ECG or EKG) shows the pattern of your heartbeat. Small pads (electrodes) are placed on your chest, arms, and legs. Wires connect the pads to the ECG machine, which records your hearts electrical signals. This can give the doctor information about heart function.  · An echocardiogram uses ultrasound waves to show the structure and movement of your heart muscle. This shows how well the heart pumps. It also shows the thickness of the heart walls, and if the heart is enlarged. It is one of the most useful, non-invasive tests as it provides information about the heart's general function. This helps your doctor make treatment decisions.  · Lab tests evaluate small amounts of blood or urine for signs of problems. A BNP lab test can help diagnose and evaluate heart failure. BNP stands for B-type natriuretic peptide. The ventricles secrete more BNP when heart failure worsens. Lab tests can also provide information about metabolic dysfunction or heart dysfunction.  Your treatment plan  Based on the results of your evaluation and tests, your doctor will develop a treatment plan. This plan is designed to relieve some of your heart failure symptoms and help make you more comfortable. Your treatment plan may include:  · Medicine to help your heart work better and improve your quality of life  · Changes in what you eat and drink to help prevent fluid from backing up in your body  · Daily monitoring of your weight and heart failure symptoms to see how well your treatment plan is working  · Exercise to help you stay healthy  · Help with quitting smoking  · Emotional and psychological support to help adjust to the changes  · Referrals to other specialists to make sure you are being treated  comprehensively  Date Last Reviewed: 3/21/2016  © 4442-9459 [employer]. [address] [place] [address], [place] [address]. All rights reserved. This information is not intended as a substitute for professional medical care. Always follow your healthcare professional's instructions.              Heart Failure: Making Changes to Your Diet  You have a condition called heart failure. When you have heart failure, excess fluid is more likely to build up in your body because your heart isn't working well. This makes the heart work harder to pump blood. Fluid buildup causes symptoms such as shortness of breath and swelling (edema). This is often referred to as congestive heart failure or CHF. Controlling the amount of salt (sodium) you eat may help stop fluid from building up. Your doctor may also tell you to reduce the amount of fluid you drink.  Reading food labels    Your healthcare provider will tell you how much sodium you can eat each day. Read food labels to keep track. Keep in mind that certain foods are high in salt. These include canned, frozen, and processed foods. Check the amount of sodium in each serving. Watch out for high-sodium ingredients. These include MSG (monosodium glutamate), baking soda, and sodium phosphate.   Eating less salt  Give yourself time to get used to eating less salt. It may take a little while. Here are some tips to help:  · Take the saltshaker off the table. Replace it with salt-free herb mixes and spices.  · Eat fresh or plain frozen vegetables. These have much less salt than canned vegetables.  · Choose low-sodium snacks like sodium-free pretzels, crackers, or air-popped popcorn.  · Dont add salt to your food when youre cooking. Instead, season your foods with pepper, lemon, garlic, or onion.  · When you eat out, ask that your food be cooked without added salt.  · Avoid eating fried foods as these often have a great deal of salt.  If youre told to limit fluids  You may need  to limit how much fluid you have to help prevent swelling. This includes anything that is liquid at room temperature, such as ice cream and soup. If your doctor tells you to limit fluid, try these tips:  · Measure drinks in a measuring cup before you drink them. This will help you meet daily goals.  · Chill drinks to make them more refreshing.  · Suck on frozen lemon wedges to quench thirst.  · Only drink when youre thirsty.  · Chew sugarless gum or suck on hard candy to keep your mouth moist.  · Weigh yourself daily to know if your body's fluid content is rising.  My sodium goal  Your healthcare provider may give you a sodium goal to meet each day. This includes sodium found in food as well as salt that you add. My goal is to eat no more than ___________ mg of sodium per day.     When to call your doctor  Call your doctor right away if you have any symptoms of worsening heart failure. These can include:  · Sudden weight gain  · Increased swelling of your legs or ankles  · Trouble breathing when youre resting or at night  · Increase in the number of pillows you have to sleep on  · Chest pain, pressure, discomfort, or pain in the jaw, neck, or back   Date Last Reviewed: 3/21/2016  © 4725-5387 [employer]. [address] [place] [address], [place] [address]. All rights reserved. This information is not intended as a substitute for professional medical care. Always follow your healthcare professional's instructions.              Heart Failure: Medicines to Help Your Heart    You have a condition called heart failure (also known as congestive heart failure, or CHF). Your doctor will likely prescribe medicines for heart failure and any underlying health problems you have. Most heart failure patients take one or more types of medicinen. Your healthcare provider will work to find the combination of medicines that works best for you.  Heart failure medicines  Here are the most common heart failure medicines:  · ACE  inhibitors lower blood pressure and decrease strain on the heart. This makes it easier for the heart to pump. Angiotensin receptor blockers have similar effects. These are prescribed for some patients instead of ACE inhibitors.  · Beta-blockers relieve stress on the heart. They also improve symptoms. They may also improve the heart's pumping action over time.  · Diuretics (also called water pills) help rid your body of excess water. This can help rid your body of swelling (edema). Having less fluid to pump means your heart doesnt have to work as hard. Some diuretics make your body lose a mineral called potassium. Your doctor will tell you if you need to take supplements or eat more foods high in potassium.  · Digoxin helps your heart pump with more strength. This helps your heart pump more blood with each beat. So, more oxygen-rich blood travels to the rest of the body.  · Aldosterone antagonists help alter hormones and decrease strain on the heart.  · Hydralazine and nitrates are two separate medicines used together to treat heart failure. They may come in one combination pill. They lower blood pressure and decrease how hard the heart has to pump.  Medicines for related conditions  Controlling other heart problems helps keep heart failure under control, too. Depending on other heart problems you have, medicines may be prescribed to:  · Lower blood pressure (antihypertensives).  · Lower cholesterol levels (statins).  · Prevent blood clots (anticoagulants or aspirin).  · Keep the heartbeat steady (antiarrhythmics).  Date Last Reviewed: 3/5/2016  © 8425-4775 [employer]. [address] [place] [address], [place] [address]. All rights reserved. This information is not intended as a substitute for professional medical care. Always follow your healthcare professional's instructions.              Heart Failure: Procedures That May Help    The heart is a muscle that pumps oxygen-rich blood to all parts of the  body. When you have heart failure, the heart is not able to pump as well as it should. Blood and fluid may back up into the lungs (congestive heart failure), and some parts of the body dont get enough oxygen-rich blood to work normally. These problems lead to the symptoms of heart failure.     Certain procedures may help the heart pump better in some cases of heart failure. Some procedures are done to treat health problems that may have caused the heart failure such as coronary artery disease or heart rhythm problems. For more serious heart failure, other options are available.  Treating artery and valve problems  If you have coronary artery disease or valve disease, procedures may be done to improve blood flow. This helps the heart pump better, which can improve heart failure symptoms. First, your doctor may do a cardiac catheterization to help detect clogged blood vessels or valve damage. During this procedure, a  thin tube (catheter) in inserted into a blood vessel and guided to the heart. There a dye is injected and a special type of X-ray (angiogram) is taken of the blood vessels. Procedures to open a blocked artery or fix damaged valves can also be done using catheterization.  · Angioplasty uses a balloon-tipped instrument at the end of the catheter. The balloon is inflated to widen the narrowed artery. In many cases, a stent is expanded to further support the narrowed artery. A stent is a metal mesh tube.  · Valve surgery repairs or replacement of faulty valves can also be done during catheterization so blood can flow properly through the chambers of the heart.  Bypass surgery is another option to help treat blocked arteries. It uses a healthy blood vessel from elsewhere in the body. The healthy blood vessel is attached above and below the blocked area so that blood can flow around the blocked artery.  Treating heart rhythm problems  A device may be placed in the chest to help a weak heart maintain a  healthy, heartbeat so the heart can pump more effectively:  · Pacemaker. A pacemaker is an implanted device that regulates your heartbeat electronically. It monitors your heart's rhythm and generates a painless electric impulse that helps the heart beat in a regular rhythm. A pacemaker is programmed to meet your specific heart rhythm needs.  · Biventricular pacing/cardiac resynchronization therapy. A type of pacemaker that paces both pumping chambers of the heart at the same time to coordinate contractions and to improve the heart's function. Some people with heart failure are candidates for this therapy.  · Implantable cardioverter defibrillator. A device similar to a pacemaker that senses when the heart is beating too fast and delivers an electrical shock to convert the fast rhythm to a normal rhythm. This can be a life saving device.  In severe cases  In more serious cases of heart failure when other treatments no longer work, other options may include:  · Ventricular assist devices (VADs). These are mechanical devices used to take over the pumping function for one or both of the heart's ventricles, or pumping chambers. A VAD may be necessary when heart failure progresses to the point that medicines and other treatments no longer help. In some cases, a VAD may be used as a bridge to transplant.  · Heart transplant. This is replacing the diseased heart with a healthy one from a donor. This is an option for a few people who are very sick. A heart transplant is very serious and not an option for all patients. Your doctor can tell you more.  Date Last Reviewed: 3/20/2016  © 6493-3950 [employer]. [address] [place] [address], [place] [address]. All rights reserved. This information is not intended as a substitute for professional medical care. Always follow your healthcare professional's instructions.              Heart Failure: Tracking Your Weight  You have a condition called heart failure. When you have  heart failure, a sudden weight gain or a steady rise in weight is a warning sign that your body is retaining too much water and salt. This could mean your heart failure is getting worse. If left untreated, it can cause problems for your lungs and result in shortness of breath. Weighing yourself each day is the best way to know if youre retaining water. If your weight goes up quickly, call your doctor. You will be given instructions on how to get rid of the excess water. You will likely need medicines and to avoid salt. This will help your heart work better.  Call your doctor if you gain more than 2 pounds in 1 day, more than 5 pounds in 1 week, or whatever weight gain you were told to report by your doctor. This is often a sign of worsening heart failure and needs to be evaluated and treated. Your doctor will tell you what to do next.   Tips for weighing yourself    · Weigh yourself at the same time each morning, wearing the same clothes. Weigh yourself after urinating and before eating.  · Use the same scale each day. Make sure the numbers are easy to read. Put the scale on a flat, hard surface -- not on a rug or carpet.  · Do not stop weighing yourself. If you forget one day, weigh again the next morning.  How to use your weight chart  · Keep your weight chart near the scale. Write your weight on the chart as soon as you get off the scale.  · Fill in the month and the start date on the chart. Then write down your weight each day. Your chart will look like this:    · If you miss a day, leave the space blank. Weigh yourself the next day and write your weight in the next space.  · Take your weight chart with you when you go to see your doctor.  Date Last Reviewed: 3/20/2016  © 0549-4089 [employer]. [address] [place] [address], [place] [address]. All rights reserved. This information is not intended as a substitute for professional medical care. Always follow your healthcare professional's  instructions.              Heart Failure: Warning Signs of a Flare-Up  You have a condition called heart failure. Once you have heart failure, flare-ups can happen. Below are signs that can mean your heart failure is getting worse. If you notice any of these warning signs, call your healthcare provider.  Swelling    · Your feet, ankles, or lower legs get puffier.  · You notice skin changes on your lower legs.  · Your shoes feel too tight.  · Your clothes are tighter in the waist.  · You have trouble getting rings on or off your fingers.  Shortness of breath  · You have to breathe harder even when youre doing your normal activities or when youre resting.  · You are short of breath walking up stairs or even short distances.  · You wake up at night short of breath or coughing.  · You need to use more pillows or sit up to sleep.  · You wake up tired or restless.  Other warning signs  · You feel weaker, dizzy, or more tired.  · You have chest pain or changes in your heartbeat.  · You have a cough that wont go away.  · You cant remember things or dont feel like eating.  Tracking your weight  Gaining weight is often the first warning sign that heart failure is getting worse. Gaining even a few pounds can be a sign that your body is retaining excess water and salt. Weighing yourself each day in the morning after you urinate and before you eat, is the best way to know if you're retaining water. Get a scale that is easy to read and make sure you wear the same clothes and use the same scale every time you weigh. Your healthcare provider will show you how to track your weight. Call your doctor if you gain more than 2 pounds in 1 day, 5 pounds in 1 week, or whatever weight gain you were told to report by your doctor. This is often a sign of worsening heart failure and needs to be evaluated and treated before it compromises your breathing. Your doctor will tell you what to do next.    Date Last Reviewed: 3/15/2016  ©  6178-2669 [employer]. [address] [place] [address], [place] [address]. All rights reserved. This information is not intended as a substitute for professional medical care. Always follow your healthcare professional's instructions.              Diabetes Discharge Instructions                                   [facility] complies with applicable Federal civil rights laws and does not discriminate on the basis of race, color, national origin, age, disability, or sex.

## 2017-03-16 PROBLEM — K80.20 CHOLELITHIASIS: Status: ACTIVE | Noted: 2017-03-16

## 2017-03-16 LAB
ALBUMIN SERPL BCP-MCNC: 3.2 G/DL
ALP SERPL-CCNC: 72 U/L
ALT SERPL W/O P-5'-P-CCNC: 58 U/L
AMYLASE SERPL-CCNC: 104 U/L
ANION GAP SERPL CALC-SCNC: 7 MMOL/L
AST SERPL-CCNC: 44 U/L
BASOPHILS # BLD AUTO: 0.01 K/UL
BASOPHILS NFR BLD: 0.1 %
BILIRUB DIRECT SERPL-MCNC: 0.3 MG/DL
BILIRUB SERPL-MCNC: 0.7 MG/DL
BUN SERPL-MCNC: 12 MG/DL
CALCIUM SERPL-MCNC: 8.5 MG/DL
CHLORIDE SERPL-SCNC: 107 MMOL/L
CK MB SERPL-MCNC: 2.8 NG/ML
CK MB SERPL-RTO: 1.7 %
CK SERPL-CCNC: 164 U/L
CK SERPL-CCNC: 164 U/L
CO2 SERPL-SCNC: 24 MMOL/L
CREAT SERPL-MCNC: 0.9 MG/DL
DIASTOLIC DYSFUNCTION: YES
DIFFERENTIAL METHOD: ABNORMAL
EOSINOPHIL # BLD AUTO: 0.2 K/UL
EOSINOPHIL NFR BLD: 2 %
ERYTHROCYTE [DISTWIDTH] IN BLOOD BY AUTOMATED COUNT: 13.7 %
EST. GFR  (AFRICAN AMERICAN): >60 ML/MIN/1.73 M^2
EST. GFR  (NON AFRICAN AMERICAN): >60 ML/MIN/1.73 M^2
GLUCOSE SERPL-MCNC: 203 MG/DL
HCT VFR BLD AUTO: 38.9 %
HGB BLD-MCNC: 13.1 G/DL
LIPASE SERPL-CCNC: 39 U/L
LYMPHOCYTES # BLD AUTO: 3.4 K/UL
LYMPHOCYTES NFR BLD: 37.4 %
MCH RBC QN AUTO: 29 PG
MCHC RBC AUTO-ENTMCNC: 33.7 %
MCV RBC AUTO: 86 FL
MONOCYTES # BLD AUTO: 0.9 K/UL
MONOCYTES NFR BLD: 9.7 %
NEUTROPHILS # BLD AUTO: 4.7 K/UL
NEUTROPHILS NFR BLD: 50.8 %
PLATELET # BLD AUTO: 158 K/UL
PMV BLD AUTO: 9.8 FL
POCT GLUCOSE: 147 MG/DL (ref 70–110)
POCT GLUCOSE: 154 MG/DL (ref 70–110)
POCT GLUCOSE: 176 MG/DL (ref 70–110)
POCT GLUCOSE: 178 MG/DL (ref 70–110)
POTASSIUM SERPL-SCNC: 4.2 MMOL/L
PROT SERPL-MCNC: 7.3 G/DL
RBC # BLD AUTO: 4.51 M/UL
RETIRED EF AND QEF - SEE NOTES: 45 (ref 55–65)
SODIUM SERPL-SCNC: 138 MMOL/L
TROPONIN I SERPL DL<=0.01 NG/ML-MCNC: 0.11 NG/ML
WBC # BLD AUTO: 9.18 K/UL

## 2017-03-16 PROCEDURE — 93306 TTE W/DOPPLER COMPLETE: CPT | Mod: 26,,, | Performed by: INTERNAL MEDICINE

## 2017-03-16 PROCEDURE — 63600175 PHARM REV CODE 636 W HCPCS: Performed by: NURSE PRACTITIONER

## 2017-03-16 PROCEDURE — 84484 ASSAY OF TROPONIN QUANT: CPT

## 2017-03-16 PROCEDURE — 25000003 PHARM REV CODE 250: Performed by: NURSE PRACTITIONER

## 2017-03-16 PROCEDURE — 85025 COMPLETE CBC W/AUTO DIFF WBC: CPT

## 2017-03-16 PROCEDURE — 80048 BASIC METABOLIC PNL TOTAL CA: CPT

## 2017-03-16 PROCEDURE — 94640 AIRWAY INHALATION TREATMENT: CPT

## 2017-03-16 PROCEDURE — 82150 ASSAY OF AMYLASE: CPT

## 2017-03-16 PROCEDURE — 82962 GLUCOSE BLOOD TEST: CPT

## 2017-03-16 PROCEDURE — 99223 1ST HOSP IP/OBS HIGH 75: CPT | Mod: ,,, | Performed by: INTERNAL MEDICINE

## 2017-03-16 PROCEDURE — 80076 HEPATIC FUNCTION PANEL: CPT

## 2017-03-16 PROCEDURE — 25000003 PHARM REV CODE 250: Performed by: INTERNAL MEDICINE

## 2017-03-16 PROCEDURE — 83036 HEMOGLOBIN GLYCOSYLATED A1C: CPT

## 2017-03-16 PROCEDURE — G0378 HOSPITAL OBSERVATION PER HR: HCPCS

## 2017-03-16 PROCEDURE — 94761 N-INVAS EAR/PLS OXIMETRY MLT: CPT

## 2017-03-16 PROCEDURE — 82553 CREATINE MB FRACTION: CPT

## 2017-03-16 PROCEDURE — 25000003 PHARM REV CODE 250: Performed by: EMERGENCY MEDICINE

## 2017-03-16 PROCEDURE — 25500020 PHARM REV CODE 255: Performed by: NURSE PRACTITIONER

## 2017-03-16 PROCEDURE — 93306 TTE W/DOPPLER COMPLETE: CPT

## 2017-03-16 PROCEDURE — 36415 COLL VENOUS BLD VENIPUNCTURE: CPT

## 2017-03-16 PROCEDURE — 27000221 HC OXYGEN, UP TO 24 HOURS

## 2017-03-16 PROCEDURE — 25500020 PHARM REV CODE 255: Performed by: EMERGENCY MEDICINE

## 2017-03-16 PROCEDURE — 83690 ASSAY OF LIPASE: CPT

## 2017-03-16 PROCEDURE — 25000003 PHARM REV CODE 250: Performed by: PHYSICIAN ASSISTANT

## 2017-03-16 PROCEDURE — 99222 1ST HOSP IP/OBS MODERATE 55: CPT | Mod: 57,,, | Performed by: SURGERY

## 2017-03-16 RX ORDER — METOPROLOL SUCCINATE 50 MG/1
100 TABLET, EXTENDED RELEASE ORAL DAILY
Status: DISCONTINUED | OUTPATIENT
Start: 2017-03-17 | End: 2017-03-20 | Stop reason: HOSPADM

## 2017-03-16 RX ORDER — TRAMADOL HYDROCHLORIDE 50 MG/1
50 TABLET ORAL EVERY 6 HOURS PRN
Status: DISCONTINUED | OUTPATIENT
Start: 2017-03-16 | End: 2017-03-20 | Stop reason: HOSPADM

## 2017-03-16 RX ORDER — IPRATROPIUM BROMIDE 0.5 MG/2.5ML
0.5 SOLUTION RESPIRATORY (INHALATION) EVERY 6 HOURS
Status: DISCONTINUED | OUTPATIENT
Start: 2017-03-16 | End: 2017-03-18

## 2017-03-16 RX ORDER — METOPROLOL SUCCINATE 50 MG/1
50 TABLET, EXTENDED RELEASE ORAL ONCE
Status: COMPLETED | OUTPATIENT
Start: 2017-03-16 | End: 2017-03-16

## 2017-03-16 RX ORDER — DEXTROSE MONOHYDRATE, SODIUM CHLORIDE, AND POTASSIUM CHLORIDE 50; 1.49; 4.5 G/1000ML; G/1000ML; G/1000ML
INJECTION, SOLUTION INTRAVENOUS CONTINUOUS
Status: DISCONTINUED | OUTPATIENT
Start: 2017-03-16 | End: 2017-03-18

## 2017-03-16 RX ORDER — ONDANSETRON 2 MG/ML
4 INJECTION INTRAMUSCULAR; INTRAVENOUS EVERY 8 HOURS PRN
Status: DISCONTINUED | OUTPATIENT
Start: 2017-03-16 | End: 2017-03-20 | Stop reason: HOSPADM

## 2017-03-16 RX ORDER — LISINOPRIL 10 MG/1
10 TABLET ORAL DAILY
Status: DISCONTINUED | OUTPATIENT
Start: 2017-03-17 | End: 2017-03-20 | Stop reason: HOSPADM

## 2017-03-16 RX ADMIN — ASPIRIN 81 MG: 81 TABLET, COATED ORAL at 09:03

## 2017-03-16 RX ADMIN — IPRATROPIUM BROMIDE 0.5 MG: 0.5 SOLUTION RESPIRATORY (INHALATION) at 07:03

## 2017-03-16 RX ADMIN — GABAPENTIN 300 MG: 300 CAPSULE ORAL at 05:03

## 2017-03-16 RX ADMIN — TRAMADOL HYDROCHLORIDE 50 MG: 50 TABLET, COATED ORAL at 04:03

## 2017-03-16 RX ADMIN — GABAPENTIN 300 MG: 300 CAPSULE ORAL at 09:03

## 2017-03-16 RX ADMIN — ATORVASTATIN CALCIUM 40 MG: 40 TABLET, FILM COATED ORAL at 08:03

## 2017-03-16 RX ADMIN — SERTRALINE HYDROCHLORIDE 50 MG: 50 TABLET, FILM COATED ORAL at 09:03

## 2017-03-16 RX ADMIN — GABAPENTIN 300 MG: 300 CAPSULE ORAL at 02:03

## 2017-03-16 RX ADMIN — METOPROLOL SUCCINATE 50 MG: 50 TABLET, EXTENDED RELEASE ORAL at 12:03

## 2017-03-16 RX ADMIN — LISINOPRIL 5 MG: 5 TABLET ORAL at 09:03

## 2017-03-16 RX ADMIN — METOPROLOL SUCCINATE 50 MG: 50 TABLET, EXTENDED RELEASE ORAL at 10:03

## 2017-03-16 RX ADMIN — IOHEXOL 75 ML: 350 INJECTION, SOLUTION INTRAVENOUS at 08:03

## 2017-03-16 RX ADMIN — ENOXAPARIN SODIUM 40 MG: 100 INJECTION SUBCUTANEOUS at 12:03

## 2017-03-16 RX ADMIN — TRAMADOL HYDROCHLORIDE 50 MG: 50 TABLET, COATED ORAL at 10:03

## 2017-03-16 RX ADMIN — AMLODIPINE BESYLATE 10 MG: 10 TABLET ORAL at 09:03

## 2017-03-16 RX ADMIN — IPRATROPIUM BROMIDE 0.5 MG: 0.5 SOLUTION RESPIRATORY (INHALATION) at 12:03

## 2017-03-16 RX ADMIN — PANTOPRAZOLE SODIUM 40 MG: 40 TABLET, DELAYED RELEASE ORAL at 09:03

## 2017-03-16 RX ADMIN — IOHEXOL 50 ML: 350 INJECTION, SOLUTION INTRAVENOUS at 05:03

## 2017-03-16 RX ADMIN — NITROGLYCERIN 0.4 MG: 0.4 TABLET SUBLINGUAL at 09:03

## 2017-03-16 NOTE — ASSESSMENT & PLAN NOTE
Serial troponin, ASA, BB, ACE, Statin, Lovenox x 1 dose   Consult cardiology in am   Serial troponin level 0.108, 0.106  Cardiology stated pt with [facility] in 11/2016  1. Non-obstructive CAD.   2.   Non-ischemic Cardiomyopathy

## 2017-03-16 NOTE — SUBJECTIVE & OBJECTIVE
Interval History:     Medications:  Continuous Infusions:   Scheduled Meds:   amlodipine  10 mg Oral Daily    aspirin  81 mg Oral Daily    atorvastatin  40 mg Oral QHS    enoxaparin  40 mg Subcutaneous Daily    gabapentin  300 mg Oral TID    ipratropium  0.5 mg Nebulization Q6H    [START ON 3/17/2017] lisinopril  10 mg Oral Daily    [START ON 3/17/2017] metoprolol succinate  100 mg Oral Daily    pantoprazole  40 mg Oral Daily    sertraline  50 mg Oral Daily     PRN Meds:dextrose 50%, dextrose 50%, glucagon (human recombinant), glucose, glucose, insulin aspart, nitroGLYCERIN, ondansetron, promethazine (PHENERGAN) IVPB, tramadol     Review of patient's allergies indicates:  No Known Allergies  Objective:     Vital Signs (Most Recent):  Temp: 97.8 °F (36.6 °C) (03/16/17 1600)  Pulse: 83 (03/16/17 1705)  Resp: 20 (03/16/17 1600)  BP: 109/70 (03/16/17 1600)  SpO2: (!) 93 % (03/16/17 1600) Vital Signs (24h Range):  Temp:  [97.6 °F (36.4 °C)-98.8 °F (37.1 °C)] 97.8 °F (36.6 °C)  Pulse:  [] 83  Resp:  [18-22] 20  SpO2:  [93 %-97 %] 93 %  BP: (102-146)/(62-86) 109/70     Weight: 93.5 kg (206 lb 2.1 oz)  Body mass index is 34.3 kg/(m^2).    Intake/Output - Last 3 Shifts       03/14 0700 - 03/15 0659 03/15 0700 - 03/16 0659 03/16 0700 - 03/17 0659    P.O.  360     Total Intake(mL/kg)  360 (3.9)     Urine (mL/kg/hr)  150     Total Output   150      Net   +210             Stool Occurrence  0 x           Physical Exam   Constitutional: He is oriented to person, place, and time. He appears well-developed and well-nourished.   HENT:   Head: Normocephalic and atraumatic.   Eyes: EOM are normal.   Neck: Normal range of motion. Neck supple.   Cardiovascular: Normal rate and regular rhythm.    Pulmonary/Chest: Effort normal and breath sounds normal.   Abdominal: Soft. Bowel sounds are normal. He exhibits no distension. There is tenderness (Patient ttp throughout epigastric area (from RUQ to LUQ) and RLQ, worse in RUQ ).    Well healed right subcostal incision   Neurological: He is alert and oriented to person, place, and time.   Skin: Skin is warm and dry.   Vitals reviewed.      Significant Labs:  CBC:   Recent Labs  Lab 03/16/17  0404   WBC 9.18   RBC 4.51*   HGB 13.1*   HCT 38.9*      MCV 86   MCH 29.0   MCHC 33.7     CMP:   Recent Labs  Lab 03/16/17  0404 03/16/17  1137   *  --    CALCIUM 8.5*  --    ALBUMIN  --  3.2*   PROT  --  7.3     --    K 4.2  --    CO2 24  --      --    BUN 12  --    CREATININE 0.9  --    ALKPHOS  --  72   ALT  --  58*   AST  --  44*   BILITOT  --  0.7     Coagulation:   Recent Labs  Lab 03/15/17  1156   INR 1.0   APTT 24.5     Cardiac markers:   Recent Labs  Lab 03/16/17  0021   CPKMB 2.8   TROPONINI 0.106*         Significant Diagnostics:  CTA Chest:  1. No evidence of pulmonary embolism.  2. Patchy groundglass opacities in the bilateral posterior lungs could be related to gravitational change/dependent atelectasis, infectious/inflammatory etiology or less likely pulmonary edema.  No other acute abnormality identified.  3.  Coronary artery calcifications.  4.  Cholelithiasis without evidence of acute cholecystitis.    U/S:  The liver is a upper limits of normal in size and diffusely fatty. No focal liver lesion identified. Gallbladder is nondistended.  No pericholecystic fluid or wall thickening.  Multiple echogenic shadowing gallstones. The common duct measures  4  mm.

## 2017-03-16 NOTE — PROGRESS NOTES
Pt arrived to floor via stretcher per [facility] x 2;  awake and alert; ambulated to bed; oriented to room and call light; telemetry monitor applied; educated on hourly rounding; full assess per flowsheets

## 2017-03-16 NOTE — H&P
"[facility] Hospital Medicine  History & Physical    Patient Name: Crow Green  MRN: [id number]  Admission Date: 3/15/2017  Attending Physician: Dr Charles   Primary Care Provider: Mateo Lambert DO         Patient information was obtained from patient and ER records.     Subjective:     Principal Problem:Chest pain    Chief Complaint:   Chief Complaint   Patient presents with    Chest Pain     since this past Sat. pain mid chest radiating to the R        HPI: The pt is a 60 yo AAM with NICM Ef 40%, nonobstructive CAD ([facility] 11/2016), DM, HTN, Asthma who presented to ED with chest pain and right shoulder pain x 4 days. Pt reports 4 days ago, he had onset of right shoulder pain at rest -worse with arm movement and better with rest. Later that night, he had substernal chest pain while in bed rated 7/10 described as sharp. He took NTG and went to bed. He then woke up and felt chest pain -on /off since then.The pain is worsened with deep breaths, when he attempts to sit up from a laying position. The pt also reports nausea and diarrhea. He had multiple  "watery" stools for the last 4 days that resolved today. Denies SOB, edema, weight gain. He does endorse dizziness- however, he reports dizziness on/off for several months.   In the ED, EKG showed NSR with incomplete LBB. Troponin 0.119>0.108 (last troponin 11/16/16 was 0.205) CTA chest showed no PE, patchy ground glass opacities could be related to gravitational change/dependent atelectasis, infectious/inflammatory etiology or less likely pulmonary edema.       Past Medical History:   Diagnosis Date    Arthritis     Asthma     Depression     Diabetes mellitus     Diabetes mellitus, type 2 2000    Elevated PSA     Hypertension        History reviewed. No pertinent surgical history.    Review of patient's allergies indicates:  No Known Allergies    No current facility-administered medications on file prior to encounter.      Current Outpatient " "Prescriptions on File Prior to Encounter   Medication Sig    albuterol 90 mcg/actuation inhaler Inhale 2 puffs into the lungs every 6 (six) hours as needed for Wheezing.    amlodipine (NORVASC) 10 MG tablet Take 1 tablet (10 mg total) by mouth once daily.    aspirin (ECOTRIN) 81 MG EC tablet Take 1 tablet (81 mg total) by mouth once daily.    atorvastatin (LIPITOR) 40 MG tablet Take 1 tablet (40 mg total) by mouth every evening.    blood sugar diagnostic Strp 1 strip by Misc.(Non-Drug; Combo Route) route 6 (six) times daily.    blood-glucose meter (CONTOUR NEXT EZ METER) kit Use as instructed    fluticasone-salmeterol 100-50 mcg/dose (ADVAIR DISKUS) 100-50 mcg/dose diskus inhaler Inhale 1 puff into the lungs 2 (two) times daily. Controller    gabapentin (NEURONTIN) 300 MG capsule Take 1 capsule (300 mg total) by mouth 3 (three) times daily.    insulin glargine (LANTUS SOLOSTAR) 100 unit/mL (3 mL) InPn pen Inject 50 Units into the skin every evening.    insulin lispro (HUMALOG KWIKPEN) 100 unit/mL InPn pen 10 units subcutaneously twice times a day 10-15 min before meals    latanoprost 0.005 % ophthalmic solution Place 1 drop into both eyes every evening.    lisinopril (PRINIVIL,ZESTRIL) 5 MG tablet Take 1 tablet (5 mg total) by mouth once daily.    metformin (GLUCOPHAGE) 1000 MG tablet Take 1 tablet (1,000 mg total) by mouth 2 (two) times daily with meals.    metoprolol succinate (TOPROL-XL) 50 MG 24 hr tablet TAKE 1 TABLET EVERY DAY    naproxen (EC NAPROSYN) 500 MG EC tablet TAKE 1 TABLET(500 MG) BY MOUTH TWICE DAILY    nitroGLYCERIN (NITROSTAT) 0.3 MG SL tablet Place 1 tablet (0.3 mg total) under the tongue every 5 (five) minutes as needed for Chest pain. No more than 3 tablets in one day.    pantoprazole (PROTONIX) 40 MG tablet Take 1 tablet (40 mg total) by mouth once daily.    pen needle, diabetic 31 gauge x 5/16" Ndle Inject 1 pen into the skin 2 (two) times daily. Use one 2 times a day    " sertraline (ZOLOFT) 50 MG tablet Take 1 tablet (50 mg total) by mouth once daily.    tiotropium (SPIRIVA WITH HANDIHALER) 18 mcg inhalation capsule Inhale 1 capsule (18 mcg total) into the lungs once daily. Controller    insulin syringe-needle U-100 1 mL 31 gauge x 5/16 Syrg      Family History     Problem Relation (Age of Onset)    Cataracts Mother, Father, Sister    Glaucoma Mother, Sister, Maternal Aunt        Social History Main Topics    Smoking status: Never Smoker    Smokeless tobacco: Not on file    Alcohol use No    Drug use: No    Sexual activity: Not Currently     Review of Systems   Constitutional: Positive for fatigue. Negative for appetite change, chills, diaphoresis and fever.   HENT: Negative for congestion, nosebleeds, sore throat and trouble swallowing.    Eyes: Negative for pain, discharge and visual disturbance.   Respiratory: Positive for chest tightness. Negative for apnea, cough, shortness of breath, wheezing and stridor.    Cardiovascular: Positive for chest pain. Negative for palpitations and leg swelling.   Gastrointestinal: Negative for abdominal distention, abdominal pain, blood in stool, constipation, diarrhea, nausea and vomiting.   Endocrine: Negative for cold intolerance and heat intolerance.   Genitourinary: Negative for difficulty urinating, dysuria, flank pain, frequency and urgency.   Musculoskeletal: Positive for back pain. Negative for arthralgias, joint swelling, myalgias, neck pain and neck stiffness.        Right shoulder pain    Skin: Negative for rash and wound.   Allergic/Immunologic: Negative for food allergies and immunocompromised state.   Neurological: Positive for dizziness. Negative for seizures, syncope, facial asymmetry, weakness, light-headedness and headaches.   Hematological: Negative for adenopathy.   Psychiatric/Behavioral: Negative for agitation, behavioral problems and confusion. The patient is not nervous/anxious.      Objective:     Vital Signs  (Most Recent):  Temp: 98.3 °F (36.8 °C) (03/15/17 2145)  Pulse: 101 (03/15/17 2202)  Resp: 20 (03/15/17 2145)  BP: 137/86 (03/15/17 2202)  SpO2: 97 % (03/15/17 2145) Vital Signs (24h Range):  Temp:  [95.7 °F (35.4 °C)-98.8 °F (37.1 °C)] 98.3 °F (36.8 °C)  Pulse:  [] 101  Resp:  [16-27] 20  SpO2:  [94 %-97 %] 97 %  BP: (114-147)/(64-86) 137/86     Weight: 93.5 kg (206 lb 2.1 oz)  Body mass index is 34.3 kg/(m^2).    Physical Exam   Constitutional: He is oriented to person, place, and time. He appears well-developed and well-nourished. No distress.   HENT:   Head: Normocephalic and atraumatic.   Nose: Nose normal.   Mouth/Throat: Oropharynx is clear and moist.   Eyes: Conjunctivae and EOM are normal. No scleral icterus.   Neck: Normal range of motion. Neck supple.   Cardiovascular: Normal rate, regular rhythm, normal heart sounds and intact distal pulses.  Exam reveals no gallop and no friction rub.    No murmur heard.  Pulmonary/Chest: Effort normal. No stridor. No respiratory distress. He has no wheezes. He has no rales. He exhibits no tenderness.   Diminished to bases    Abdominal: Soft. Bowel sounds are normal. He exhibits no distension. There is no tenderness. There is no rebound and no guarding.   Musculoskeletal: Normal range of motion. He exhibits no edema, tenderness or deformity.   Painful and limited ROM to right shoulder    Neurological: He is alert and oriented to person, place, and time. He has normal reflexes. No cranial nerve deficit. He exhibits normal muscle tone. Coordination normal.   Skin: Skin is warm and dry. No rash noted. He is not diaphoretic. No erythema. No pallor.   Psychiatric: He has a normal mood and affect. His behavior is normal. Thought content normal.   Nursing note and vitals reviewed.       Significant Labs:   BMP:   Recent Labs  Lab 03/15/17  1156   *      K 4.2      CO2 20*   BUN 13   CREATININE 0.9   CALCIUM 8.7     CBC:   Recent Labs  Lab  03/15/17  1156   WBC 10.04   HGB 14.1   HCT 42.7        CMP:   Recent Labs  Lab 03/15/17  1156      K 4.2      CO2 20*   *   BUN 13   CREATININE 0.9   CALCIUM 8.7   PROT 7.9   ALBUMIN 3.5   BILITOT 0.7   ALKPHOS 76   AST 43*   ALT 53*   ANIONGAP 13   EGFRNONAA >60.0     Troponin:   Recent Labs  Lab 03/15/17  1156 03/15/17  1812   TROPONINI 0.119* 0.108*     All pertinent labs within the past 24 hours have been reviewed.    Significant Imaging:   Imaging Results         CTA Chest Non-Coronary (Final result) Result time:  03/15/17 14:00:03    Final result by Pablo Sims III, MD (03/15/17 14:00:03)    Impression:      1. No evidence of pulmonary embolism.  2. Patchy groundglass opacities in the bilateral posterior lungs could be related to gravitational change/dependent atelectasis, infectious/inflammatory etiology or less likely pulmonary edema.  No other acute abnormality identified.  3.  Coronary artery calcifications.  4.  Cholelithiasis without evidence of acute cholecystitis.            Electronically signed by: PABLO SIMS MD  Date:     03/15/17  Time:    14:00     Narrative:    CTA CHEST NON CORONARY    Clinical history: R07.9 Chest pain, unspecified.  sharp stabbing chest pain radiating into the right arm. Possible pulmonary embolism    Technique: Routine CT angiogram of the chest protocol performed after the IV administration of 100 mL Omnipaque 350. 3D reconstructions/reformats/MIPs obtained. All CT scans at this facility use dose modulation, iterative reconstruction, and/or weight based dosing when appropriate to reduce radiation dose to as low as reasonably achievable.    Comparison: None    Findings:  There is no evidence of pulmonary embolism.  No aortic aneurysm or dissection is identified.  There is no cardiomegaly or pericardial effusion.  Coronary artery calcifications are noted. No pathologically enlarged lymph nodes are seen in the chest.  There are symmetric patchy  groundglass opacities scattered in the posterior aspects of both lungs, most prominent in the lower lobes.  There is chronic elevation of the right diaphragm with mild adjacent discoid atelectasis.  The remainder of the lungs appear clear of active disease.  No effusion or pneumothorax identified.  The airways and bronchi appear patent.  No acute osseous abnormality is seen. Limited images through the upper abdomen demonstrate gallstones without evidence of acute cholecystitis.            X-Ray Chest AP Portable (Final result) Result time:  03/15/17 12:06:23    Final result by Vimal Vance MD (03/15/17 12:06:23)    Impression:     See above      Electronically signed by: VIMAL VANCE MD  Date:     03/15/17  Time:    12:06     Narrative:    History: Chest pain    Shallow lung volumes with compression of markings at the bases and minimal right basilar plate atelectasis.. No definite infiltrates. Unchanged borderline heart size.            Assessment/Plan:     * Chest pain  Serial troponin, ASA, BB, ACE, Statin, lovenox x one dose   Consult cardiology in am       Hypertension  Cont Norvasc, Toprol      Type 2 diabetes mellitus with hyperglycemia  NISS      Chronic combined systolic and diastolic congestive heart failure  Cont ACE      Obstructive sleep apnea syndrome  Cont CPAP      Asthma  Duonebs       VTE Risk Mitigation         Ordered     Medium Risk of VTE  Once      03/15/17 2132     Reason for No Pharmacological VTE Prophylaxis  Once      03/15/17 2132        Esther Macias NP  Department of Hospital Medicine   [facility]

## 2017-03-16 NOTE — ASSESSMENT & PLAN NOTE
Pt has RUQ abdominal pain  + Heard's sign  Multiple gallstone according to US  No signs of cholecystitis  Surgery consult pending

## 2017-03-16 NOTE — CONSULTS
Consult Note  Cardiology    Consult Requested By: Dr. Solorio  Reason for Consult: Chest pain, elevated troponin    SUBJECTIVE:     History of Present Illness:  Patient is a 61 y.o. male with a PMHx of NICM (EF=40%), non-obstructive CAD, DM, and HTN who presented to [facility] ED yesterday complaining of right shoulder pain and substernal chest pain that started last night. Per patient, he initially took NTG and went to sleep, but symptoms persisted, which prompted him to seek further evaluation and treatment. Associated symptoms included nausea and diarrhea. Patient denied any associated fever, chills, SOB, diaphoresis, palpitations, near syncope, or syncope. He reported symptoms worsened with deep breaths and sitting up. Workup in ED revealed initial troponin of 0.118 and patient was subsequently transferred to [facility] for further evaluation and treatment. Patient seen and examined today, resting in bed. He is well known to cardiology service. At present time, still complains of pleuritic chest discomfort, RUQ tenderness, and SOB. He reports compliance with his medications. He previously had LHC in 11/16 which showed non-obstructive CAD. Chart reviewed. Repeat troponin 0.108, 0.106. Liver enzymes mildly elevated (43/53). EKG reviewed and showed SR with PAC's, incomplete LBBB.  Review of overnight monitor shows brief runs of PAF. CTA of chest reviewed and negative for PE, but showed cholelithiasis.        Review of patient's allergies indicates:   No Known Allergies    Past Medical History:   Diagnosis Date    Arthritis     Asthma     Depression     Diabetes mellitus     Diabetes mellitus, type 2 2000    Elevated PSA     Hypertension      History reviewed. No pertinent surgical history.  Family History   Problem Relation Age of Onset    Glaucoma Mother     Cataracts Mother     Cataracts Father     Glaucoma Sister     Cataracts Sister     Glaucoma Maternal Aunt     Prostate cancer Neg Hx      Social  History   Substance Use Topics    Smoking status: Never Smoker    Smokeless tobacco: None    Alcohol use No        Review of Systems:  Constitutional: negative  Eyes: negative  Ears, nose, mouth, throat, and face: negative  Respiratory: +SOB  Cardiovascular: +Chest pain  Gastrointestinal: +nausea, diarrhea  Genitourinary:negative  Hematologic/lymphatic: negative  Musculoskeletal: +right shoulder pain,   Neurological: negative  Behavioral/Psych: negative  Endocrine: negative  Allergic/Immunologic: negative    OBJECTIVE:     Vital Signs (Most Recent)  Temp: 98.1 °F (36.7 °C) (03/16/17 0701)  Pulse: 76 (03/16/17 0731)  Resp: 19 (03/16/17 0731)  BP: 131/81 (03/16/17 0701)  SpO2: 97 % (03/16/17 0731)    Vital Signs Range (Last 24H):  Temp:  [98 °F (36.7 °C)-98.8 °F (37.1 °C)]   Pulse:  []   Resp:  [16-27]   BP: (102-147)/(62-86)   SpO2:  [94 %-97 %]     Current Facility-Administered Medications   Medication    amlodipine tablet 10 mg    aspirin EC tablet 81 mg    atorvastatin tablet 40 mg    dextrose 50% injection 12.5 g    dextrose 50% injection 25 g    enoxaparin injection 40 mg    gabapentin capsule 300 mg    glucagon (human recombinant) injection 1 mg    glucose chewable tablet 16 g    glucose chewable tablet 24 g    insulin aspart pen 0-5 Units    ipratropium 0.02 % nebulizer solution 0.5 mg    lisinopril tablet 5 mg    metoprolol succinate (TOPROL-XL) 24 hr tablet 50 mg    nitroGLYCERIN SL tablet 0.4 mg    pantoprazole EC tablet 40 mg    sertraline tablet 50 mg    tramadol tablet 50 mg     No current facility-administered medications on file prior to encounter.      Current Outpatient Prescriptions on File Prior to Encounter   Medication Sig    albuterol 90 mcg/actuation inhaler Inhale 2 puffs into the lungs every 6 (six) hours as needed for Wheezing.    amlodipine (NORVASC) 10 MG tablet Take 1 tablet (10 mg total) by mouth once daily.    aspirin (ECOTRIN) 81 MG EC tablet Take 1 tablet  "(81 mg total) by mouth once daily.    atorvastatin (LIPITOR) 40 MG tablet Take 1 tablet (40 mg total) by mouth every evening.    blood sugar diagnostic Strp 1 strip by Misc.(Non-Drug; Combo Route) route 6 (six) times daily.    blood-glucose meter (CONTOUR NEXT EZ METER) kit Use as instructed    fluticasone-salmeterol 100-50 mcg/dose (ADVAIR DISKUS) 100-50 mcg/dose diskus inhaler Inhale 1 puff into the lungs 2 (two) times daily. Controller    gabapentin (NEURONTIN) 300 MG capsule Take 1 capsule (300 mg total) by mouth 3 (three) times daily.    insulin glargine (LANTUS SOLOSTAR) 100 unit/mL (3 mL) InPn pen Inject 50 Units into the skin every evening.    insulin lispro (HUMALOG KWIKPEN) 100 unit/mL InPn pen 10 units subcutaneously twice times a day 10-15 min before meals    latanoprost 0.005 % ophthalmic solution Place 1 drop into both eyes every evening.    lisinopril (PRINIVIL,ZESTRIL) 5 MG tablet Take 1 tablet (5 mg total) by mouth once daily.    metformin (GLUCOPHAGE) 1000 MG tablet Take 1 tablet (1,000 mg total) by mouth 2 (two) times daily with meals.    metoprolol succinate (TOPROL-XL) 50 MG 24 hr tablet TAKE 1 TABLET EVERY DAY    naproxen (EC NAPROSYN) 500 MG EC tablet TAKE 1 TABLET(500 MG) BY MOUTH TWICE DAILY    nitroGLYCERIN (NITROSTAT) 0.3 MG SL tablet Place 1 tablet (0.3 mg total) under the tongue every 5 (five) minutes as needed for Chest pain. No more than 3 tablets in one day.    pantoprazole (PROTONIX) 40 MG tablet Take 1 tablet (40 mg total) by mouth once daily.    pen needle, diabetic 31 gauge x 5/16" Ndle Inject 1 pen into the skin 2 (two) times daily. Use one 2 times a day    sertraline (ZOLOFT) 50 MG tablet Take 1 tablet (50 mg total) by mouth once daily.    tiotropium (SPIRIVA WITH HANDIHALER) 18 mcg inhalation capsule Inhale 1 capsule (18 mcg total) into the lungs once daily. Controller    insulin syringe-needle U-100 1 mL 31 gauge x 5/16 Syrg        Physical Exam:  General " appearance: alert, appears stated age and cooperative  Head: Normocephalic, without obvious abnormality, atraumatic  Neck: no adenopathy, no carotid bruit, no JVD  Lungs:  clear to auscultation bilaterally  Chest wall: no tenderness  Heart: regular rate and rhythm, S1, S2 normal, no murmur, click, rub or gallop, +Extrasystoles  Abdomen: soft, +RUQ tenderness with guarding  Extremities: extremities normal, atraumatic, no cyanosis or edema  Skin: Skin color, texture, turgor normal. No rashes or lesions  Neurologic: Grossly normal, AAO x 3  Laboratory:  Chemistry:   Lab Results   Component Value Date     03/16/2017    K 4.2 03/16/2017     03/16/2017    CO2 24 03/16/2017    BUN 12 03/16/2017    CREATININE 0.9 03/16/2017    CALCIUM 8.5 (L) 03/16/2017     Cardiac Markers:   Lab Results   Component Value Date    TROPONINI 0.106 (H) 03/16/2017     Cardiac Markers (Last 3):   Lab Results   Component Value Date    TROPONINI 0.106 (H) 03/16/2017    TROPONINI 0.108 (H) 03/15/2017    TROPONINI 0.119 (H) 03/15/2017     CBC:   Lab Results   Component Value Date    WBC 9.18 03/16/2017    HGB 13.1 (L) 03/16/2017    HCT 38.9 (L) 03/16/2017    MCV 86 03/16/2017     03/16/2017     Lipids:   Lab Results   Component Value Date    CHOL 189 11/16/2016    TRIG 134 11/16/2016    HDL 42 11/16/2016     Coagulation:   Lab Results   Component Value Date    INR 1.0 03/15/2017    APTT 24.5 03/15/2017       Diagnostic Results:  ECG: Reviewed  X-Ray: Reviewed  Echo: Repeat pending      ASSESSMENT/PLAN:     Patient Active Problem List   Diagnosis    ED (erectile dysfunction)    Testicular hypogonadism    Non-proliferative diabetic retinopathy, mild, both eyes    Hypertension    Diabetic polyneuropathy    Type 2 diabetes mellitus with hyperglycemia    Abnormal nuclear stress test    Coronary artery disease involving native coronary artery without angina pectoris    Chronic combined systolic and diastolic congestive heart  failure    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, with long-term current use of insulin    Obstructive sleep apnea syndrome    Asthma    Delayed sleep phase syndrome    Psychophysiological insomnia    Nightmares    Sleep related gastroesophageal reflux disease    Inadequate sleep hygiene    Chest pain      Patient who presents with chest pain and elevated troponin, seems non-cardiac, possibly secondary to symptomatic cholelithiasis as patient was very tender in RUQ during exam and liver enzymes are mildly elevated. Would suggest further workup with U/S or HIDA scan. Cardiac wise, patient is having intermittent runs of PAF. Will increase BB and ACEI to optimize medication. He will also need anticoagulation pending treatment of gallbladder issues. Recent cath 11/16 showed non-obstructive CAD.    Plan:   -Continue ASA, statin  -Increase Toprol XL to 100 mg daily  -Increase Lisinopril to 10 mg daily  -Needs further GI workup  -Check 2D echo to make sure no pericardial effusion/pericarditis    Chart reviewed. Dr. Bolanos examined patient and agrees with plan as outlined above.

## 2017-03-16 NOTE — SUBJECTIVE & OBJECTIVE
No current facility-administered medications on file prior to encounter.      Current Outpatient Prescriptions on File Prior to Encounter   Medication Sig    albuterol 90 mcg/actuation inhaler Inhale 2 puffs into the lungs every 6 (six) hours as needed for Wheezing.    amlodipine (NORVASC) 10 MG tablet Take 1 tablet (10 mg total) by mouth once daily.    aspirin (ECOTRIN) 81 MG EC tablet Take 1 tablet (81 mg total) by mouth once daily.    atorvastatin (LIPITOR) 40 MG tablet Take 1 tablet (40 mg total) by mouth every evening.    blood sugar diagnostic Strp 1 strip by Misc.(Non-Drug; Combo Route) route 6 (six) times daily.    blood-glucose meter (CONTOUR NEXT EZ METER) kit Use as instructed    fluticasone-salmeterol 100-50 mcg/dose (ADVAIR DISKUS) 100-50 mcg/dose diskus inhaler Inhale 1 puff into the lungs 2 (two) times daily. Controller    gabapentin (NEURONTIN) 300 MG capsule Take 1 capsule (300 mg total) by mouth 3 (three) times daily.    insulin glargine (LANTUS SOLOSTAR) 100 unit/mL (3 mL) InPn pen Inject 50 Units into the skin every evening.    insulin lispro (HUMALOG KWIKPEN) 100 unit/mL InPn pen 10 units subcutaneously twice times a day 10-15 min before meals    latanoprost 0.005 % ophthalmic solution Place 1 drop into both eyes every evening.    lisinopril (PRINIVIL,ZESTRIL) 5 MG tablet Take 1 tablet (5 mg total) by mouth once daily.    metformin (GLUCOPHAGE) 1000 MG tablet Take 1 tablet (1,000 mg total) by mouth 2 (two) times daily with meals.    metoprolol succinate (TOPROL-XL) 50 MG 24 hr tablet TAKE 1 TABLET EVERY DAY    naproxen (EC NAPROSYN) 500 MG EC tablet TAKE 1 TABLET(500 MG) BY MOUTH TWICE DAILY    nitroGLYCERIN (NITROSTAT) 0.3 MG SL tablet Place 1 tablet (0.3 mg total) under the tongue every 5 (five) minutes as needed for Chest pain. No more than 3 tablets in one day.    pantoprazole (PROTONIX) 40 MG tablet Take 1 tablet (40 mg total) by mouth once daily.    pen needle, diabetic  "31 gauge x 5/16" Ndle Inject 1 pen into the skin 2 (two) times daily. Use one 2 times a day    sertraline (ZOLOFT) 50 MG tablet Take 1 tablet (50 mg total) by mouth once daily.    tiotropium (SPIRIVA WITH HANDIHALER) 18 mcg inhalation capsule Inhale 1 capsule (18 mcg total) into the lungs once daily. Controller    insulin syringe-needle U-100 1 mL 31 gauge x 5/16 Syrg        Review of patient's allergies indicates:  No Known Allergies    Past Medical History:   Diagnosis Date    Arthritis     Asthma     Depression     Diabetes mellitus     Diabetes mellitus, type 2 2000    Elevated PSA     Hypertension      History reviewed. No pertinent surgical history.  Family History     Problem Relation (Age of Onset)    Cataracts Mother, Father, Sister    Glaucoma Mother, Sister, Maternal Aunt        Social History Main Topics    Smoking status: Never Smoker    Smokeless tobacco: Not on file    Alcohol use No    Drug use: No    Sexual activity: Not Currently     Review of Systems   Constitutional: Negative for chills, fever and unexpected weight change.   HENT: Negative for congestion.    Eyes: Negative for visual disturbance.   Respiratory: Negative for shortness of breath.    Cardiovascular: Positive for chest pain.   Gastrointestinal: Positive for abdominal pain, diarrhea and nausea. Negative for abdominal distention, constipation and vomiting.   Genitourinary: Negative for dysuria.   Musculoskeletal: Negative for arthralgias.   Skin: Negative for rash.   Neurological: Negative for light-headedness.   Hematological: Negative for adenopathy.     Objective:     Vital Signs (Most Recent):  Temp: 97.8 °F (36.6 °C) (03/16/17 1600)  Pulse: 83 (03/16/17 1705)  Resp: 20 (03/16/17 1600)  BP: 109/70 (03/16/17 1600)  SpO2: (!) 93 % (03/16/17 1600) Vital Signs (24h Range):  Temp:  [97.6 °F (36.4 °C)-98.8 °F (37.1 °C)] 97.8 °F (36.6 °C)  Pulse:  [] 83  Resp:  [18-22] 20  SpO2:  [93 %-97 %] 93 %  BP: " (102-146)/(62-86) 109/70     Weight: 93.5 kg (206 lb 2.1 oz)  Body mass index is 34.3 kg/(m^2).    Physical Exam   Constitutional: He is oriented to person, place, and time. He appears well-developed and well-nourished.   HENT:   Head: Normocephalic and atraumatic.   Eyes: EOM are normal.   Neck: Normal range of motion. Neck supple.   Cardiovascular: Normal rate and regular rhythm.    Pulmonary/Chest: Effort normal and breath sounds normal.   Abdominal: Soft. Bowel sounds are normal. He exhibits no distension. There is tenderness (TTP throughout epigastric area from RUQ to LUQ also down to RLQ; worse in RUQ, well healed right subcostal incision).   Neurological: He is alert and oriented to person, place, and time.   Skin: Skin is warm and dry.   Vitals reviewed.      Significant Labs:  CBC:   Recent Labs  Lab 03/16/17  0404   WBC 9.18   RBC 4.51*   HGB 13.1*   HCT 38.9*      MCV 86   MCH 29.0   MCHC 33.7     CMP:   Recent Labs  Lab 03/16/17  0404 03/16/17  1137   *  --    CALCIUM 8.5*  --    ALBUMIN  --  3.2*   PROT  --  7.3     --    K 4.2  --    CO2 24  --      --    BUN 12  --    CREATININE 0.9  --    ALKPHOS  --  72   ALT  --  58*   AST  --  44*   BILITOT  --  0.7     Coagulation:   Recent Labs  Lab 03/15/17  1156   INR 1.0   APTT 24.5     Cardiac markers:   Recent Labs  Lab 03/16/17  0021   CPKMB 2.8   TROPONINI 0.106*         Significant Diagnostics:  CTA Chest:  1. No evidence of pulmonary embolism.  2. Patchy groundglass opacities in the bilateral posterior lungs could be related to gravitational change/dependent atelectasis, infectious/inflammatory etiology or less likely pulmonary edema.  No other acute abnormality identified.  3.  Coronary artery calcifications.  4.  Cholelithiasis without evidence of acute cholecystitis.    U/S:  The liver is a upper limits of normal in size and diffusely fatty. No focal liver lesion identified. Gallbladder is nondistended.  No pericholecystic  fluid or wall thickening.  Multiple echogenic shadowing gallstones. The common duct measures  4  mm.

## 2017-03-16 NOTE — ED NOTES
Pt c/o midsternal CP since Saturday. Symptoms worsen with sitting up. Pt describes pain as intermittent throbbing midsternal CP that radiates to R side. Pain relieved with nitro that was applied earlier. Pain approx a 4/10 at this time. Reported nausea earlier, but relieved with medication administered by day shift nurse.     Level of Consciousness: Patient is awake, alert, oriented to person, place, time, and situation.    Appearance: Pt sitting comfortably in stretcher, no acute distress at this time. Clothing appropriately placed and clean. Hygiene is appropriate.   Skin: Skin is warm, dry, and intact. Skin turgor is normal/elastic. Mucous membranes moist. Skin color is normal for ethnicity. No skin breakdown noted.  Musculoskeletal: Reports R shoulder pain since Saturday. Moves all extremities well. Full active ROM. No deformities noted. Denies any weakness. Gait steady, ambulates without use of assistive devices.   Respiratory: Airway open and patent. Respirations equal, tachypneic. SOB with exertion. Expiratory wheezes noted bilaterally anterior. Reports worsening of SOB over the past couple weeks. Hx of Asthma. 2L NC.    Cardiac: SR with sinus arrhythmia with occasional PACs. No peripheral edema noted. Radial and pedal pulses present and normal. Capillary refill is within normal limits. See CP description above.   GI: Obese. Abdomen soft, non-tender to all quadrants with palpitation. Bowel sounds present and active in all quads. Abdomen symmetric with no distention noted. Denies any N/V/D. Reported nausea with CP earlier. None at this time.    Neurological: Symmetrical expressions noted to face. Equal bilateral . Normal sensation reported to all extremities. No obvious neurological deficits noted.   Psychosocial: Speech spontaneous, clear, and coherent. Appropriate to situation. Pt is calm and cooperative.     Pt informed of plan of care, verbalizes understanding, and denies any other questions,  complaints, or concerns at this time. Bed in locked in lowest position, siderails up x2, call light within reach. Pt continued on cardiac monitor, blood pressure cuff and continuous pulse ox in place. Will continue to monitor.

## 2017-03-16 NOTE — PLAN OF CARE
Problem: Patient Care Overview  Goal: Plan of Care Review  Outcome: Ongoing (interventions implemented as appropriate)  Free of falls/injury during shift  Pain managed effectively w/ PRN meds  Cards c/s this am  NPO  Very dyspneic w/ exertion  2L in place  CPap qhs  DM monitored and managed  NSR on telemetry  Safety interventions in place

## 2017-03-16 NOTE — PROGRESS NOTES
"[facility] Hospital Medicine  Progress Note    Patient Name: Crow Green  MRN: [id number]  Patient Class: OP- Observation   Admission Date: 3/15/2017  Length of Stay: 0 days  Attending Physician: Car Pineda MD  Primary Care Provider: Mateo Lambetr DO        Subjective:     Principal Problem:Cholelithiasis    HPI:  The pt is a 62 yo AAM with NICM Ef 40%, nonobstructive CAD ([facility] 11/2016), DM, HTN, Asthma who presented to ED with chest pain and right shoulder pain x 4 days. Pt reports 4 days ago, he had onset of right shoulder pain at rest -worse with arm movement and better with rest. Later that night, he had substernal chest pain while in bed rated 7/10 described as sharp. He took NTG and went to bed. He then woke up and felt chest pain -on /off since then.The pain is worsened with deep breaths, when he attempts to sit up from a laying position. The pt also reports nausea and diarrhea. He had multiple  "watery" stools for the last 4 days that resolved today. Denies SOB, edema, weight gain. He does endorse dizziness- however, he reports dizziness on/off for several months.   In the ED, EKG showed NSR with incomplete LBB. Troponin 0.119>0.108 (last troponin 11/16/16 was 0.205) CTA chest showed no PE, patchy ground glass opacities could be related to gravitational change/dependent atelectasis, infectious/inflammatory etiology or less likely pulmonary edema.       Hospital Course:       Interval History:      Pt was seen and examined by Dr. Bolanos. He advised need for Surgical consult as patient has RUQ abdominal pain.  Pt states he has a hx of gallstones and possibly had gallstones removed surgically. He states it occurred in past and he doesn't remember specifics.  He reports pain to right shoulder, RUQ abdominal pain, nausea and diarrhea. RUQ abdominal US indicates nondistended gallbladder - no pericholecystic fluid or wall thickening. Multiple echogenic shadowing gallstones.     Review of " Systems   Constitutional: Positive for fatigue. Negative for appetite change, chills, diaphoresis and fever.   HENT: Negative for congestion, nosebleeds, sore throat and trouble swallowing.    Eyes: Negative for pain, discharge and visual disturbance.   Respiratory: Negative for apnea, cough, chest tightness, shortness of breath, wheezing and stridor.    Cardiovascular: Positive for chest pain. Negative for palpitations and leg swelling.   Gastrointestinal: Positive for abdominal pain, diarrhea and nausea. Negative for abdominal distention, blood in stool, constipation and vomiting.   Endocrine: Negative for cold intolerance and heat intolerance.   Genitourinary: Negative for difficulty urinating, dysuria, flank pain, frequency and urgency.   Musculoskeletal: Positive for back pain. Negative for arthralgias, joint swelling, myalgias, neck pain and neck stiffness.        Right shoulder pain    Skin: Negative for rash and wound.   Allergic/Immunologic: Negative for food allergies and immunocompromised state.   Neurological: Positive for dizziness. Negative for seizures, syncope, facial asymmetry, weakness, light-headedness and headaches.   Hematological: Negative for adenopathy.   Psychiatric/Behavioral: Negative for agitation, behavioral problems and confusion. The patient is not nervous/anxious.      Objective:     Vital Signs (Most Recent):  Temp: 97.6 °F (36.4 °C) (03/16/17 1153)  Pulse: 86 (03/16/17 1225)  Resp: 18 (03/16/17 1225)  BP: 130/79 (03/16/17 1153)  SpO2: 96 % (03/16/17 1225) Vital Signs (24h Range):  Temp:  [97.6 °F (36.4 °C)-98.8 °F (37.1 °C)] 97.6 °F (36.4 °C)  Pulse:  [] 86  Resp:  [18-22] 18  SpO2:  [95 %-97 %] 96 %  BP: (102-147)/(62-86) 130/79     Weight: 93.5 kg (206 lb 2.1 oz)  Body mass index is 34.3 kg/(m^2).    Intake/Output Summary (Last 24 hours) at 03/16/17 1602  Last data filed at 03/16/17 0500   Gross per 24 hour   Intake              360 ml   Output              150 ml   Net               210 ml      Physical Exam   Constitutional: He is oriented to person, place, and time. He appears well-developed and well-nourished. No distress.   HENT:   Head: Normocephalic and atraumatic.   Eyes: Conjunctivae and EOM are normal. No scleral icterus.   Neck: Normal range of motion. Neck supple.   Cardiovascular: Normal rate, regular rhythm and normal heart sounds.    Pulmonary/Chest: Effort normal. No respiratory distress.   Diminished to bases    Abdominal: Soft. Bowel sounds are normal. He exhibits no distension. There is tenderness in the right upper quadrant. There is rebound and positive Heard's sign.       Musculoskeletal: Normal range of motion. He exhibits no edema, tenderness or deformity.   Painful and limited ROM to right shoulder    Neurological: He is alert and oriented to person, place, and time. He exhibits normal muscle tone.   Skin: Skin is warm and dry. No rash noted. He is not diaphoretic. No erythema. No pallor.   Psychiatric: He has a normal mood and affect. His behavior is normal. Thought content normal.   Nursing note and vitals reviewed.      Significant Labs:   CBC:   Recent Labs  Lab 03/15/17  1156 03/16/17  0404   WBC 10.04 9.18   HGB 14.1 13.1*   HCT 42.7 38.9*    158     CMP:   Recent Labs  Lab 03/15/17  1156 03/16/17  0404 03/16/17  1137    138  --    K 4.2 4.2  --     107  --    CO2 20* 24  --    * 203*  --    BUN 13 12  --    CREATININE 0.9 0.9  --    CALCIUM 8.7 8.5*  --    PROT 7.9  --  7.3   ALBUMIN 3.5  --  3.2*   BILITOT 0.7  --  0.7   ALKPHOS 76  --  72   AST 43*  --  44*   ALT 53*  --  58*   ANIONGAP 13 7*  --    EGFRNONAA >60.0 >60  --      All pertinent labs within the past 24 hours have been reviewed.    Significant Imaging: I have reviewed all pertinent imaging results/findings within the past 24 hours.    Assessment/Plan:      * Cholelithiasis  Pt has RUQ abdominal pain  + Heard's sign  Multiple gallstone according to US  No signs of  cholecystitis  Surgery consult pending      Chest pain  Serial troponin, ASA, BB, ACE, Statin, Lovenox x 1 dose   Consult cardiology in am   Serial troponin level 0.108, 0.106  Cardiology stated pt with [facility] in 11/2016  1. Non-obstructive CAD.   2.   Non-ischemic Cardiomyopathy      Hypertension  Cont Norvasc, Toprol      Type 2 diabetes mellitus with hyperglycemia  NISS      Chronic combined systolic and diastolic congestive heart failure  Cont ACE      Obstructive sleep apnea syndrome  Cont CPAP      Asthma  Duonebs       VTE Risk Mitigation         Ordered     enoxaparin injection 40 mg  Daily     Route:  Subcutaneous        03/15/17 5097     Medium Risk of VTE  Once      03/15/17 2132     Reason for No Pharmacological VTE Prophylaxis  Once      03/15/17 2132          Katey Chan NP  Department of Hospital Medicine   [facility]

## 2017-03-16 NOTE — PLAN OF CARE
Problem: Patient Care Overview  Goal: Plan of Care Review  Outcome: Ongoing (interventions implemented as appropriate)  Pt has remained free of injury for this shift, prn pain meds given upon request, bed alarm activated, family has remained at bedside.

## 2017-03-16 NOTE — SUBJECTIVE & OBJECTIVE
Interval History:      Pt was seen and examined by Dr. Bolanos. He advised need for Surgical consult as patient has RUQ abdominal pain.  Pt states he has a hx of gallstones and possibly had gallstones removed surgically. He states it occurred in past and he doesn't remember specifics.  He reports pain to right shoulder, RUQ abdominal pain, nausea and diarrhea. RUQ abdominal US indicates nondistended gallbladder - no pericholecystic fluid or wall thickening. Multiple echogenic shadowing gallstones.     Review of Systems   Constitutional: Positive for fatigue. Negative for appetite change, chills, diaphoresis and fever.   HENT: Negative for congestion, nosebleeds, sore throat and trouble swallowing.    Eyes: Negative for pain, discharge and visual disturbance.   Respiratory: Negative for apnea, cough, chest tightness, shortness of breath, wheezing and stridor.    Cardiovascular: Positive for chest pain. Negative for palpitations and leg swelling.   Gastrointestinal: Positive for abdominal pain, diarrhea and nausea. Negative for abdominal distention, blood in stool, constipation and vomiting.   Endocrine: Negative for cold intolerance and heat intolerance.   Genitourinary: Negative for difficulty urinating, dysuria, flank pain, frequency and urgency.   Musculoskeletal: Positive for back pain. Negative for arthralgias, joint swelling, myalgias, neck pain and neck stiffness.        Right shoulder pain    Skin: Negative for rash and wound.   Allergic/Immunologic: Negative for food allergies and immunocompromised state.   Neurological: Positive for dizziness. Negative for seizures, syncope, facial asymmetry, weakness, light-headedness and headaches.   Hematological: Negative for adenopathy.   Psychiatric/Behavioral: Negative for agitation, behavioral problems and confusion. The patient is not nervous/anxious.      Objective:     Vital Signs (Most Recent):  Temp: 97.6 °F (36.4 °C) (03/16/17 1153)  Pulse: 86 (03/16/17  1225)  Resp: 18 (03/16/17 1225)  BP: 130/79 (03/16/17 1153)  SpO2: 96 % (03/16/17 1225) Vital Signs (24h Range):  Temp:  [97.6 °F (36.4 °C)-98.8 °F (37.1 °C)] 97.6 °F (36.4 °C)  Pulse:  [] 86  Resp:  [18-22] 18  SpO2:  [95 %-97 %] 96 %  BP: (102-147)/(62-86) 130/79     Weight: 93.5 kg (206 lb 2.1 oz)  Body mass index is 34.3 kg/(m^2).    Intake/Output Summary (Last 24 hours) at 03/16/17 1602  Last data filed at 03/16/17 0500   Gross per 24 hour   Intake              360 ml   Output              150 ml   Net              210 ml      Physical Exam   Constitutional: He is oriented to person, place, and time. He appears well-developed and well-nourished. No distress.   HENT:   Head: Normocephalic and atraumatic.   Eyes: Conjunctivae and EOM are normal. No scleral icterus.   Neck: Normal range of motion. Neck supple.   Cardiovascular: Normal rate, regular rhythm and normal heart sounds.    Pulmonary/Chest: Effort normal. No respiratory distress.   Diminished to bases    Abdominal: Soft. Bowel sounds are normal. He exhibits no distension. There is tenderness in the right upper quadrant. There is rebound and positive Heard's sign.       Musculoskeletal: Normal range of motion. He exhibits no edema, tenderness or deformity.   Painful and limited ROM to right shoulder    Neurological: He is alert and oriented to person, place, and time. He exhibits normal muscle tone.   Skin: Skin is warm and dry. No rash noted. He is not diaphoretic. No erythema. No pallor.   Psychiatric: He has a normal mood and affect. His behavior is normal. Thought content normal.   Nursing note and vitals reviewed.      Significant Labs:   CBC:   Recent Labs  Lab 03/15/17  1156 03/16/17  0404   WBC 10.04 9.18   HGB 14.1 13.1*   HCT 42.7 38.9*    158     CMP:   Recent Labs  Lab 03/15/17  1156 03/16/17  0404 03/16/17  1137    138  --    K 4.2 4.2  --     107  --    CO2 20* 24  --    * 203*  --    BUN 13 12  --     CREATININE 0.9 0.9  --    CALCIUM 8.7 8.5*  --    PROT 7.9  --  7.3   ALBUMIN 3.5  --  3.2*   BILITOT 0.7  --  0.7   ALKPHOS 76  --  72   AST 43*  --  44*   ALT 53*  --  58*   ANIONGAP 13 7*  --    EGFRNONAA >60.0 >60  --      All pertinent labs within the past 24 hours have been reviewed.    Significant Imaging: I have reviewed all pertinent imaging results/findings within the past 24 hours.

## 2017-03-16 NOTE — SUBJECTIVE & OBJECTIVE
Past Medical History:   Diagnosis Date    Arthritis     Asthma     Depression     Diabetes mellitus     Diabetes mellitus, type 2 2000    Elevated PSA     Hypertension        History reviewed. No pertinent surgical history.    Review of patient's allergies indicates:  No Known Allergies    No current facility-administered medications on file prior to encounter.      Current Outpatient Prescriptions on File Prior to Encounter   Medication Sig    albuterol 90 mcg/actuation inhaler Inhale 2 puffs into the lungs every 6 (six) hours as needed for Wheezing.    amlodipine (NORVASC) 10 MG tablet Take 1 tablet (10 mg total) by mouth once daily.    aspirin (ECOTRIN) 81 MG EC tablet Take 1 tablet (81 mg total) by mouth once daily.    atorvastatin (LIPITOR) 40 MG tablet Take 1 tablet (40 mg total) by mouth every evening.    blood sugar diagnostic Strp 1 strip by Misc.(Non-Drug; Combo Route) route 6 (six) times daily.    blood-glucose meter (CONTOUR NEXT EZ METER) kit Use as instructed    fluticasone-salmeterol 100-50 mcg/dose (ADVAIR DISKUS) 100-50 mcg/dose diskus inhaler Inhale 1 puff into the lungs 2 (two) times daily. Controller    gabapentin (NEURONTIN) 300 MG capsule Take 1 capsule (300 mg total) by mouth 3 (three) times daily.    insulin glargine (LANTUS SOLOSTAR) 100 unit/mL (3 mL) InPn pen Inject 50 Units into the skin every evening.    insulin lispro (HUMALOG KWIKPEN) 100 unit/mL InPn pen 10 units subcutaneously twice times a day 10-15 min before meals    latanoprost 0.005 % ophthalmic solution Place 1 drop into both eyes every evening.    lisinopril (PRINIVIL,ZESTRIL) 5 MG tablet Take 1 tablet (5 mg total) by mouth once daily.    metformin (GLUCOPHAGE) 1000 MG tablet Take 1 tablet (1,000 mg total) by mouth 2 (two) times daily with meals.    metoprolol succinate (TOPROL-XL) 50 MG 24 hr tablet TAKE 1 TABLET EVERY DAY    naproxen (EC NAPROSYN) 500 MG EC tablet TAKE 1 TABLET(500 MG) BY MOUTH TWICE  "DAILY    nitroGLYCERIN (NITROSTAT) 0.3 MG SL tablet Place 1 tablet (0.3 mg total) under the tongue every 5 (five) minutes as needed for Chest pain. No more than 3 tablets in one day.    pantoprazole (PROTONIX) 40 MG tablet Take 1 tablet (40 mg total) by mouth once daily.    pen needle, diabetic 31 gauge x 5/16" Ndle Inject 1 pen into the skin 2 (two) times daily. Use one 2 times a day    sertraline (ZOLOFT) 50 MG tablet Take 1 tablet (50 mg total) by mouth once daily.    tiotropium (SPIRIVA WITH HANDIHALER) 18 mcg inhalation capsule Inhale 1 capsule (18 mcg total) into the lungs once daily. Controller    insulin syringe-needle U-100 1 mL 31 gauge x 5/16 Syrg      Family History     Problem Relation (Age of Onset)    Cataracts Mother, Father, Sister    Glaucoma Mother, Sister, Maternal Aunt        Social History Main Topics    Smoking status: Never Smoker    Smokeless tobacco: Not on file    Alcohol use No    Drug use: No    Sexual activity: Not Currently     Review of Systems   Constitutional: Positive for fatigue. Negative for appetite change, chills, diaphoresis and fever.   HENT: Negative for congestion, nosebleeds, sore throat and trouble swallowing.    Eyes: Negative for pain, discharge and visual disturbance.   Respiratory: Positive for chest tightness. Negative for apnea, cough, shortness of breath, wheezing and stridor.    Cardiovascular: Positive for chest pain. Negative for palpitations and leg swelling.   Gastrointestinal: Negative for abdominal distention, abdominal pain, blood in stool, constipation, diarrhea, nausea and vomiting.   Endocrine: Negative for cold intolerance and heat intolerance.   Genitourinary: Negative for difficulty urinating, dysuria, flank pain, frequency and urgency.   Musculoskeletal: Positive for back pain. Negative for arthralgias, joint swelling, myalgias, neck pain and neck stiffness.        Right shoulder pain    Skin: Negative for rash and wound. "   Allergic/Immunologic: Negative for food allergies and immunocompromised state.   Neurological: Positive for dizziness. Negative for seizures, syncope, facial asymmetry, weakness, light-headedness and headaches.   Hematological: Negative for adenopathy.   Psychiatric/Behavioral: Negative for agitation, behavioral problems and confusion. The patient is not nervous/anxious.      Objective:     Vital Signs (Most Recent):  Temp: 98.3 °F (36.8 °C) (03/15/17 2145)  Pulse: 101 (03/15/17 2202)  Resp: 20 (03/15/17 2145)  BP: 137/86 (03/15/17 2202)  SpO2: 97 % (03/15/17 2145) Vital Signs (24h Range):  Temp:  [95.7 °F (35.4 °C)-98.8 °F (37.1 °C)] 98.3 °F (36.8 °C)  Pulse:  [] 101  Resp:  [16-27] 20  SpO2:  [94 %-97 %] 97 %  BP: (114-147)/(64-86) 137/86     Weight: 93.5 kg (206 lb 2.1 oz)  Body mass index is 34.3 kg/(m^2).    Physical Exam   Constitutional: He is oriented to person, place, and time. He appears well-developed and well-nourished. No distress.   HENT:   Head: Normocephalic and atraumatic.   Nose: Nose normal.   Mouth/Throat: Oropharynx is clear and moist.   Eyes: Conjunctivae and EOM are normal. No scleral icterus.   Neck: Normal range of motion. Neck supple.   Cardiovascular: Normal rate, regular rhythm, normal heart sounds and intact distal pulses.  Exam reveals no gallop and no friction rub.    No murmur heard.  Pulmonary/Chest: Effort normal. No stridor. No respiratory distress. He has no wheezes. He has no rales. He exhibits no tenderness.   Diminished to bases    Abdominal: Soft. Bowel sounds are normal. He exhibits no distension. There is no tenderness. There is no rebound and no guarding.   Musculoskeletal: Normal range of motion. He exhibits no edema, tenderness or deformity.   Painful and limited ROM to right shoulder    Neurological: He is alert and oriented to person, place, and time. He has normal reflexes. No cranial nerve deficit. He exhibits normal muscle tone. Coordination normal.   Skin:  Skin is warm and dry. No rash noted. He is not diaphoretic. No erythema. No pallor.   Psychiatric: He has a normal mood and affect. His behavior is normal. Thought content normal.   Nursing note and vitals reviewed.       Significant Labs:   BMP:   Recent Labs  Lab 03/15/17  1156   *      K 4.2      CO2 20*   BUN 13   CREATININE 0.9   CALCIUM 8.7     CBC:   Recent Labs  Lab 03/15/17  1156   WBC 10.04   HGB 14.1   HCT 42.7        CMP:   Recent Labs  Lab 03/15/17  1156      K 4.2      CO2 20*   *   BUN 13   CREATININE 0.9   CALCIUM 8.7   PROT 7.9   ALBUMIN 3.5   BILITOT 0.7   ALKPHOS 76   AST 43*   ALT 53*   ANIONGAP 13   EGFRNONAA >60.0     Troponin:   Recent Labs  Lab 03/15/17  1156 03/15/17  1812   TROPONINI 0.119* 0.108*     All pertinent labs within the past 24 hours have been reviewed.    Significant Imaging:   Imaging Results         CTA Chest Non-Coronary (Final result) Result time:  03/15/17 14:00:03    Final result by Pablo Sims III, MD (03/15/17 14:00:03)    Impression:      1. No evidence of pulmonary embolism.  2. Patchy groundglass opacities in the bilateral posterior lungs could be related to gravitational change/dependent atelectasis, infectious/inflammatory etiology or less likely pulmonary edema.  No other acute abnormality identified.  3.  Coronary artery calcifications.  4.  Cholelithiasis without evidence of acute cholecystitis.            Electronically signed by: PABLO SIMS MD  Date:     03/15/17  Time:    14:00     Narrative:    CTA CHEST NON CORONARY    Clinical history: R07.9 Chest pain, unspecified.  sharp stabbing chest pain radiating into the right arm. Possible pulmonary embolism    Technique: Routine CT angiogram of the chest protocol performed after the IV administration of 100 mL Omnipaque 350. 3D reconstructions/reformats/MIPs obtained. All CT scans at this facility use dose modulation, iterative reconstruction, and/or weight based  dosing when appropriate to reduce radiation dose to as low as reasonably achievable.    Comparison: None    Findings:  There is no evidence of pulmonary embolism.  No aortic aneurysm or dissection is identified.  There is no cardiomegaly or pericardial effusion.  Coronary artery calcifications are noted. No pathologically enlarged lymph nodes are seen in the chest.  There are symmetric patchy groundglass opacities scattered in the posterior aspects of both lungs, most prominent in the lower lobes.  There is chronic elevation of the right diaphragm with mild adjacent discoid atelectasis.  The remainder of the lungs appear clear of active disease.  No effusion or pneumothorax identified.  The airways and bronchi appear patent.  No acute osseous abnormality is seen. Limited images through the upper abdomen demonstrate gallstones without evidence of acute cholecystitis.            X-Ray Chest AP Portable (Final result) Result time:  03/15/17 12:06:23    Final result by Vimal Vance MD (03/15/17 12:06:23)    Impression:     See above      Electronically signed by: VIMAL VANCE MD  Date:     03/15/17  Time:    12:06     Narrative:    History: Chest pain    Shallow lung volumes with compression of markings at the bases and minimal right basilar plate atelectasis.. No definite infiltrates. Unchanged borderline heart size.

## 2017-03-17 ENCOUNTER — ANESTHESIA (OUTPATIENT)
Dept: SURGERY | Facility: HOSPITAL | Age: 62
DRG: 417 | End: 2017-03-17
Payer: MEDICARE

## 2017-03-17 ENCOUNTER — SURGERY (OUTPATIENT)
Age: 62
End: 2017-03-17

## 2017-03-17 ENCOUNTER — ANESTHESIA EVENT (OUTPATIENT)
Dept: SURGERY | Facility: HOSPITAL | Age: 62
DRG: 417 | End: 2017-03-17
Payer: MEDICARE

## 2017-03-17 PROBLEM — Z79.4 CONTROLLED TYPE 2 DIABETES MELLITUS WITH HYPERGLYCEMIA, WITH LONG-TERM CURRENT USE OF INSULIN: Status: ACTIVE | Noted: 2017-03-17

## 2017-03-17 PROBLEM — E11.65 CONTROLLED TYPE 2 DIABETES MELLITUS WITH HYPERGLYCEMIA, WITH LONG-TERM CURRENT USE OF INSULIN: Status: ACTIVE | Noted: 2017-03-17

## 2017-03-17 PROBLEM — I48.0 PAF (PAROXYSMAL ATRIAL FIBRILLATION): Status: ACTIVE | Noted: 2017-03-17

## 2017-03-17 LAB
ABO + RH BLD: NORMAL
BLD GP AB SCN CELLS X3 SERPL QL: NORMAL
ESTIMATED AVG GLUCOSE: 206 MG/DL
HBA1C MFR BLD HPLC: 8.8 %
POCT GLUCOSE: 166 MG/DL (ref 70–110)
POCT GLUCOSE: 181 MG/DL (ref 70–110)
POCT GLUCOSE: 184 MG/DL (ref 70–110)
POCT GLUCOSE: 233 MG/DL (ref 70–110)

## 2017-03-17 PROCEDURE — 63600175 PHARM REV CODE 636 W HCPCS: Performed by: EMERGENCY MEDICINE

## 2017-03-17 PROCEDURE — 0FT44ZZ RESECTION OF GALLBLADDER, PERCUTANEOUS ENDOSCOPIC APPROACH: ICD-10-PCS | Performed by: SURGERY

## 2017-03-17 PROCEDURE — 25000003 PHARM REV CODE 250: Performed by: EMERGENCY MEDICINE

## 2017-03-17 PROCEDURE — 63600175 PHARM REV CODE 636 W HCPCS: Performed by: SURGERY

## 2017-03-17 PROCEDURE — 71000033 HC RECOVERY, INTIAL HOUR: Performed by: SURGERY

## 2017-03-17 PROCEDURE — 36000709 HC OR TIME LEV III EA ADD 15 MIN: Performed by: SURGERY

## 2017-03-17 PROCEDURE — 27000221 HC OXYGEN, UP TO 24 HOURS

## 2017-03-17 PROCEDURE — 88304 TISSUE EXAM BY PATHOLOGIST: CPT | Mod: 26,,, | Performed by: PATHOLOGY

## 2017-03-17 PROCEDURE — 63600175 PHARM REV CODE 636 W HCPCS: Performed by: NURSE PRACTITIONER

## 2017-03-17 PROCEDURE — 86900 BLOOD TYPING SEROLOGIC ABO: CPT

## 2017-03-17 PROCEDURE — 25000003 PHARM REV CODE 250: Performed by: SURGERY

## 2017-03-17 PROCEDURE — 63600175 PHARM REV CODE 636 W HCPCS: Performed by: NURSE ANESTHETIST, CERTIFIED REGISTERED

## 2017-03-17 PROCEDURE — 25000003 PHARM REV CODE 250: Performed by: NURSE ANESTHETIST, CERTIFIED REGISTERED

## 2017-03-17 PROCEDURE — 37000008 HC ANESTHESIA 1ST 15 MINUTES: Performed by: SURGERY

## 2017-03-17 PROCEDURE — 88304 TISSUE EXAM BY PATHOLOGIST: CPT | Performed by: PATHOLOGY

## 2017-03-17 PROCEDURE — 86850 RBC ANTIBODY SCREEN: CPT

## 2017-03-17 PROCEDURE — 71000039 HC RECOVERY, EACH ADD'L HOUR: Performed by: SURGERY

## 2017-03-17 PROCEDURE — 47562 LAPAROSCOPIC CHOLECYSTECTOMY: CPT | Mod: ,,, | Performed by: SURGERY

## 2017-03-17 PROCEDURE — 99232 SBSQ HOSP IP/OBS MODERATE 35: CPT | Mod: ,,, | Performed by: INTERNAL MEDICINE

## 2017-03-17 PROCEDURE — 82962 GLUCOSE BLOOD TEST: CPT

## 2017-03-17 PROCEDURE — 37000009 HC ANESTHESIA EA ADD 15 MINS: Performed by: SURGERY

## 2017-03-17 PROCEDURE — 94640 AIRWAY INHALATION TREATMENT: CPT

## 2017-03-17 PROCEDURE — 25000003 PHARM REV CODE 250: Performed by: NURSE PRACTITIONER

## 2017-03-17 PROCEDURE — 47562 LAPAROSCOPIC CHOLECYSTECTOMY: CPT | Mod: 80,,, | Performed by: SURGERY

## 2017-03-17 PROCEDURE — 36000708 HC OR TIME LEV III 1ST 15 MIN: Performed by: SURGERY

## 2017-03-17 PROCEDURE — 25000003 PHARM REV CODE 250: Performed by: PHYSICIAN ASSISTANT

## 2017-03-17 PROCEDURE — 11000001 HC ACUTE MED/SURG PRIVATE ROOM

## 2017-03-17 PROCEDURE — 25000003 PHARM REV CODE 250: Performed by: INTERNAL MEDICINE

## 2017-03-17 PROCEDURE — 27201423 OPTIME MED/SURG SUP & DEVICES STERILE SUPPLY: Performed by: SURGERY

## 2017-03-17 RX ORDER — ONDANSETRON 2 MG/ML
4 INJECTION INTRAMUSCULAR; INTRAVENOUS DAILY PRN
Status: DISCONTINUED | OUTPATIENT
Start: 2017-03-17 | End: 2017-03-17 | Stop reason: HOSPADM

## 2017-03-17 RX ORDER — ALBUTEROL SULFATE 90 UG/1
AEROSOL, METERED RESPIRATORY (INHALATION)
Status: DISCONTINUED | OUTPATIENT
Start: 2017-03-17 | End: 2017-03-17

## 2017-03-17 RX ORDER — HYDROCODONE BITARTRATE AND ACETAMINOPHEN 5; 325 MG/1; MG/1
1 TABLET ORAL EVERY 4 HOURS PRN
Status: DISCONTINUED | OUTPATIENT
Start: 2017-03-17 | End: 2017-03-20 | Stop reason: HOSPADM

## 2017-03-17 RX ORDER — MORPHINE SULFATE 10 MG/ML
2 INJECTION INTRAMUSCULAR; INTRAVENOUS; SUBCUTANEOUS EVERY 5 MIN PRN
Status: DISCONTINUED | OUTPATIENT
Start: 2017-03-17 | End: 2017-03-17 | Stop reason: HOSPADM

## 2017-03-17 RX ORDER — NEOSTIGMINE METHYLSULFATE 1 MG/ML
INJECTION, SOLUTION INTRAVENOUS
Status: DISCONTINUED | OUTPATIENT
Start: 2017-03-17 | End: 2017-03-17

## 2017-03-17 RX ORDER — MORPHINE SULFATE 10 MG/ML
2 INJECTION INTRAMUSCULAR; INTRAVENOUS; SUBCUTANEOUS
Status: DISCONTINUED | OUTPATIENT
Start: 2017-03-17 | End: 2017-03-18

## 2017-03-17 RX ORDER — GLYCOPYRROLATE 0.2 MG/ML
INJECTION INTRAMUSCULAR; INTRAVENOUS
Status: DISCONTINUED | OUTPATIENT
Start: 2017-03-17 | End: 2017-03-17

## 2017-03-17 RX ORDER — ACETAMINOPHEN 10 MG/ML
INJECTION, SOLUTION INTRAVENOUS
Status: DISCONTINUED | OUTPATIENT
Start: 2017-03-17 | End: 2017-03-17

## 2017-03-17 RX ORDER — SUCCINYLCHOLINE CHLORIDE 20 MG/ML
INJECTION INTRAMUSCULAR; INTRAVENOUS
Status: DISCONTINUED | OUTPATIENT
Start: 2017-03-17 | End: 2017-03-17

## 2017-03-17 RX ORDER — LIDOCAINE HYDROCHLORIDE 10 MG/ML
INJECTION, SOLUTION EPIDURAL; INFILTRATION; INTRACAUDAL; PERINEURAL
Status: DISCONTINUED | OUTPATIENT
Start: 2017-03-17 | End: 2017-03-17 | Stop reason: HOSPADM

## 2017-03-17 RX ORDER — MEPERIDINE HYDROCHLORIDE 50 MG/ML
12.5 INJECTION INTRAMUSCULAR; INTRAVENOUS; SUBCUTANEOUS ONCE AS NEEDED
Status: DISCONTINUED | OUTPATIENT
Start: 2017-03-17 | End: 2017-03-17 | Stop reason: HOSPADM

## 2017-03-17 RX ORDER — BUPIVACAINE HYDROCHLORIDE 2.5 MG/ML
INJECTION, SOLUTION INFILTRATION; PERINEURAL
Status: DISCONTINUED | OUTPATIENT
Start: 2017-03-17 | End: 2017-03-17 | Stop reason: HOSPADM

## 2017-03-17 RX ORDER — HYDROMORPHONE HYDROCHLORIDE 2 MG/ML
0.2 INJECTION, SOLUTION INTRAMUSCULAR; INTRAVENOUS; SUBCUTANEOUS EVERY 5 MIN PRN
Status: DISCONTINUED | OUTPATIENT
Start: 2017-03-17 | End: 2017-03-17 | Stop reason: HOSPADM

## 2017-03-17 RX ORDER — SODIUM CHLORIDE, SODIUM LACTATE, POTASSIUM CHLORIDE, CALCIUM CHLORIDE 600; 310; 30; 20 MG/100ML; MG/100ML; MG/100ML; MG/100ML
INJECTION, SOLUTION INTRAVENOUS CONTINUOUS PRN
Status: DISCONTINUED | OUTPATIENT
Start: 2017-03-17 | End: 2017-03-17

## 2017-03-17 RX ORDER — PROMETHAZINE HYDROCHLORIDE 25 MG/ML
INJECTION, SOLUTION INTRAMUSCULAR; INTRAVENOUS
Status: DISCONTINUED | OUTPATIENT
Start: 2017-03-17 | End: 2017-03-17

## 2017-03-17 RX ORDER — AMIODARONE HYDROCHLORIDE 200 MG/1
400 TABLET ORAL ONCE
Status: COMPLETED | OUTPATIENT
Start: 2017-03-17 | End: 2017-03-17

## 2017-03-17 RX ORDER — PROMETHAZINE HYDROCHLORIDE 25 MG/ML
INJECTION, SOLUTION INTRAMUSCULAR; INTRAVENOUS
Status: DISPENSED
Start: 2017-03-17 | End: 2017-03-18

## 2017-03-17 RX ORDER — SODIUM CHLORIDE 9 MG/ML
3 INJECTION, SOLUTION INTRAMUSCULAR; INTRAVENOUS; SUBCUTANEOUS
Status: DISCONTINUED | OUTPATIENT
Start: 2017-03-17 | End: 2017-03-17 | Stop reason: HOSPADM

## 2017-03-17 RX ORDER — ROCURONIUM BROMIDE 10 MG/ML
INJECTION, SOLUTION INTRAVENOUS
Status: DISCONTINUED | OUTPATIENT
Start: 2017-03-17 | End: 2017-03-17

## 2017-03-17 RX ORDER — CEFAZOLIN SODIUM 2 G/50ML
2 SOLUTION INTRAVENOUS
Status: DISCONTINUED | OUTPATIENT
Start: 2017-03-17 | End: 2017-03-18

## 2017-03-17 RX ORDER — LIDOCAINE HYDROCHLORIDE 10 MG/ML
INJECTION INFILTRATION; PERINEURAL
Status: DISCONTINUED | OUTPATIENT
Start: 2017-03-17 | End: 2017-03-17

## 2017-03-17 RX ORDER — ETOMIDATE 2 MG/ML
INJECTION INTRAVENOUS
Status: DISCONTINUED | OUTPATIENT
Start: 2017-03-17 | End: 2017-03-17

## 2017-03-17 RX ORDER — PHENYLEPHRINE HYDROCHLORIDE 10 MG/ML
INJECTION INTRAVENOUS
Status: DISCONTINUED | OUTPATIENT
Start: 2017-03-17 | End: 2017-03-17

## 2017-03-17 RX ORDER — FENTANYL CITRATE 50 UG/ML
INJECTION, SOLUTION INTRAMUSCULAR; INTRAVENOUS
Status: DISCONTINUED | OUTPATIENT
Start: 2017-03-17 | End: 2017-03-17

## 2017-03-17 RX ADMIN — AMIODARONE HYDROCHLORIDE 400 MG: 200 TABLET ORAL at 10:03

## 2017-03-17 RX ADMIN — DEXTROSE MONOHYDRATE, SODIUM CHLORIDE, AND POTASSIUM CHLORIDE: 50; 4.5; 1.49 INJECTION, SOLUTION INTRAVENOUS at 08:03

## 2017-03-17 RX ADMIN — TRAMADOL HYDROCHLORIDE 50 MG: 50 TABLET, COATED ORAL at 06:03

## 2017-03-17 RX ADMIN — LIDOCAINE HYDROCHLORIDE 5 ML: 10 INJECTION, SOLUTION EPIDURAL; INFILTRATION; INTRACAUDAL; PERINEURAL at 02:03

## 2017-03-17 RX ADMIN — FENTANYL CITRATE 25 MCG: 50 INJECTION, SOLUTION INTRAMUSCULAR; INTRAVENOUS at 02:03

## 2017-03-17 RX ADMIN — ROCURONIUM BROMIDE 5 MG: 10 INJECTION, SOLUTION INTRAVENOUS at 01:03

## 2017-03-17 RX ADMIN — ALBUTEROL SULFATE 2 PUFF: 90 AEROSOL, METERED RESPIRATORY (INHALATION) at 01:03

## 2017-03-17 RX ADMIN — PROMETHAZINE HYDROCHLORIDE 12.5 MG: 25 INJECTION INTRAMUSCULAR; INTRAVENOUS at 03:03

## 2017-03-17 RX ADMIN — PHENYLEPHRINE HYDROCHLORIDE 100 MCG: 10 INJECTION INTRAVENOUS at 01:03

## 2017-03-17 RX ADMIN — IPRATROPIUM BROMIDE 0.5 MG: 0.5 SOLUTION RESPIRATORY (INHALATION) at 12:03

## 2017-03-17 RX ADMIN — SUCCINYLCHOLINE CHLORIDE 160 MG: 20 INJECTION, SOLUTION INTRAMUSCULAR; INTRAVENOUS at 01:03

## 2017-03-17 RX ADMIN — GABAPENTIN 300 MG: 300 CAPSULE ORAL at 09:03

## 2017-03-17 RX ADMIN — NEOSTIGMINE METHYLSULFATE 4 MG: 1 INJECTION INTRAVENOUS at 03:03

## 2017-03-17 RX ADMIN — PANTOPRAZOLE SODIUM 40 MG: 40 TABLET, DELAYED RELEASE ORAL at 09:03

## 2017-03-17 RX ADMIN — IPRATROPIUM BROMIDE 0.5 MG: 0.5 SOLUTION RESPIRATORY (INHALATION) at 07:03

## 2017-03-17 RX ADMIN — BUPIVACAINE HYDROCHLORIDE 10 ML: 2.5 INJECTION, SOLUTION INFILTRATION; PERINEURAL at 02:03

## 2017-03-17 RX ADMIN — ACETAMINOPHEN 1000 MG: 10 INJECTION, SOLUTION INTRAVENOUS at 02:03

## 2017-03-17 RX ADMIN — SODIUM CHLORIDE, SODIUM LACTATE, POTASSIUM CHLORIDE, AND CALCIUM CHLORIDE: 600; 310; 30; 20 INJECTION, SOLUTION INTRAVENOUS at 01:03

## 2017-03-17 RX ADMIN — HYDROCODONE BITARTRATE AND ACETAMINOPHEN 1 TABLET: 5; 325 TABLET ORAL at 07:03

## 2017-03-17 RX ADMIN — GLYCOPYRROLATE 0.8 MG: 0.2 INJECTION, SOLUTION INTRAMUSCULAR; INTRAVENOUS at 03:03

## 2017-03-17 RX ADMIN — SERTRALINE HYDROCHLORIDE 50 MG: 50 TABLET, FILM COATED ORAL at 09:03

## 2017-03-17 RX ADMIN — DEXTROSE MONOHYDRATE, SODIUM CHLORIDE, AND POTASSIUM CHLORIDE: 50; 4.5; 1.49 INJECTION, SOLUTION INTRAVENOUS at 12:03

## 2017-03-17 RX ADMIN — ONDANSETRON 4 MG: 2 INJECTION INTRAMUSCULAR; INTRAVENOUS at 11:03

## 2017-03-17 RX ADMIN — MORPHINE SULFATE 2 MG: 10 INJECTION, SOLUTION INTRAMUSCULAR; INTRAVENOUS at 08:03

## 2017-03-17 RX ADMIN — INSULIN ASPART 2 UNITS: 100 INJECTION, SOLUTION INTRAVENOUS; SUBCUTANEOUS at 05:03

## 2017-03-17 RX ADMIN — ROCURONIUM BROMIDE 20 MG: 10 INJECTION, SOLUTION INTRAVENOUS at 01:03

## 2017-03-17 RX ADMIN — ETOMIDATE 20 MG: 2 INJECTION, SOLUTION INTRAVENOUS at 01:03

## 2017-03-17 RX ADMIN — ALBUTEROL SULFATE 2 PUFF: 90 AEROSOL, METERED RESPIRATORY (INHALATION) at 03:03

## 2017-03-17 RX ADMIN — ASPIRIN 81 MG: 81 TABLET, COATED ORAL at 09:03

## 2017-03-17 RX ADMIN — FENTANYL CITRATE 25 MCG: 50 INJECTION, SOLUTION INTRAMUSCULAR; INTRAVENOUS at 03:03

## 2017-03-17 RX ADMIN — SODIUM CHLORIDE, SODIUM LACTATE, POTASSIUM CHLORIDE, AND CALCIUM CHLORIDE: 600; 310; 30; 20 INJECTION, SOLUTION INTRAVENOUS at 02:03

## 2017-03-17 RX ADMIN — ATORVASTATIN CALCIUM 40 MG: 40 TABLET, FILM COATED ORAL at 09:03

## 2017-03-17 RX ADMIN — CEFAZOLIN SODIUM 2 G: 2 SOLUTION INTRAVENOUS at 01:03

## 2017-03-17 RX ADMIN — HYDROCODONE BITARTRATE AND ACETAMINOPHEN 1 TABLET: 5; 325 TABLET ORAL at 11:03

## 2017-03-17 RX ADMIN — LIDOCAINE HYDROCHLORIDE 80 MG: 10 INJECTION, SOLUTION INFILTRATION; PERINEURAL at 01:03

## 2017-03-17 RX ADMIN — GABAPENTIN 300 MG: 300 CAPSULE ORAL at 05:03

## 2017-03-17 RX ADMIN — FENTANYL CITRATE 50 MCG: 50 INJECTION, SOLUTION INTRAMUSCULAR; INTRAVENOUS at 01:03

## 2017-03-17 NOTE — PLAN OF CARE
"CM met with patient regarding discharge planning.  Patient plans to return home where he lives with relatives upon discharge.  Patient reports non-adherence to medication regimen due to being "frustrated" with so many medications.  Patient reports he as family support for "ex in-laws", but none from his own family.  CM educated patient on importance of medication compliance to prevent hospitalizations and maintain health.  Patient verbalized understanding.  No post hospital needs identified at this time.      03/16/17 1433   Discharge Assessment   Assessment Type Discharge Planning Assessment   Confirmed/corrected address and phone number on facesheet? Yes   Assessment information obtained from? Patient   Expected Length of Stay (days) 1   Communicated expected length of stay with patient/caregiver yes   Prior to hospitilization cognitive status: Alert/Oriented   Prior to hospitalization functional status: Independent   Current cognitive status: Alert/Oriented   Current Functional Status: Independent   Arrived From home or self-care   Lives With other relative(s)   Able to Return to Prior Arrangements yes   Is patient able to care for self after discharge? Yes   Patient's perception of discharge disposition home or selfcare   Readmission Within The Last 30 Days no previous admission in last 30 days   Patient currently being followed by outpatient case management? No   Patient currently receives home health services? No   Does the patient currently use HME? Yes   Patient currently receives private duty nursing? No   Patient currently receives any other outside agency services? No   Equipment Currently Used at Home CPAP   Do you have any problems affording any of your prescribed medications? No   Is the patient taking medications as prescribed? no   If no, which medications is patient not taking? All medications   Do you have any financial concerns preventing you from receiving the healthcare you need? No   Does the " patient have transportation to healthcare appointments? Yes   Transportation Available public transportation   On Dialysis? No   Does the patient receive services at the [facility]? No   Are there any open cases? No   Discharge Plan A Home   Discharge Plan B Home   Patient/Family In Agreement With Plan yes

## 2017-03-17 NOTE — CONSULTS
"[facility]  General Surgery  Consult        Subjective:     Chief Complaint/Reason for Admission: chest pain    History of Present Illness: 60 y/o male referred for + kendrick sign/ abdominal pain. The patient was admitted for chest pain and right shoulder pain. He has some associated nausea and diarrhea. Denies any fever/chills, abdominal pain prior to this or with eating. He was noted to have mildly elevated troponin which was cardiology felt due to noncardiac etiology. CT chest showed cholelithiasis. The patient was noted to have significant RUQ ttp so U/S done also showing cholelithiasis without any inflammatory changes. The patient reports previous removal of "fist size"gallstones in the 70's and has right subcostal incision.    No current facility-administered medications on file prior to encounter.      Current Outpatient Prescriptions on File Prior to Encounter   Medication Sig    albuterol 90 mcg/actuation inhaler Inhale 2 puffs into the lungs every 6 (six) hours as needed for Wheezing.    amlodipine (NORVASC) 10 MG tablet Take 1 tablet (10 mg total) by mouth once daily.    aspirin (ECOTRIN) 81 MG EC tablet Take 1 tablet (81 mg total) by mouth once daily.    atorvastatin (LIPITOR) 40 MG tablet Take 1 tablet (40 mg total) by mouth every evening.    blood sugar diagnostic Strp 1 strip by Misc.(Non-Drug; Combo Route) route 6 (six) times daily.    blood-glucose meter (CONTOUR NEXT EZ METER) kit Use as instructed    fluticasone-salmeterol 100-50 mcg/dose (ADVAIR DISKUS) 100-50 mcg/dose diskus inhaler Inhale 1 puff into the lungs 2 (two) times daily. Controller    gabapentin (NEURONTIN) 300 MG capsule Take 1 capsule (300 mg total) by mouth 3 (three) times daily.    insulin glargine (LANTUS SOLOSTAR) 100 unit/mL (3 mL) InPn pen Inject 50 Units into the skin every evening.    insulin lispro (HUMALOG KWIKPEN) 100 unit/mL InPn pen 10 units subcutaneously twice times a day 10-15 min before " "meals    latanoprost 0.005 % ophthalmic solution Place 1 drop into both eyes every evening.    lisinopril (PRINIVIL,ZESTRIL) 5 MG tablet Take 1 tablet (5 mg total) by mouth once daily.    metformin (GLUCOPHAGE) 1000 MG tablet Take 1 tablet (1,000 mg total) by mouth 2 (two) times daily with meals.    metoprolol succinate (TOPROL-XL) 50 MG 24 hr tablet TAKE 1 TABLET EVERY DAY    naproxen (EC NAPROSYN) 500 MG EC tablet TAKE 1 TABLET(500 MG) BY MOUTH TWICE DAILY    nitroGLYCERIN (NITROSTAT) 0.3 MG SL tablet Place 1 tablet (0.3 mg total) under the tongue every 5 (five) minutes as needed for Chest pain. No more than 3 tablets in one day.    pantoprazole (PROTONIX) 40 MG tablet Take 1 tablet (40 mg total) by mouth once daily.    pen needle, diabetic 31 gauge x 5/16" Ndle Inject 1 pen into the skin 2 (two) times daily. Use one 2 times a day    sertraline (ZOLOFT) 50 MG tablet Take 1 tablet (50 mg total) by mouth once daily.    tiotropium (SPIRIVA WITH HANDIHALER) 18 mcg inhalation capsule Inhale 1 capsule (18 mcg total) into the lungs once daily. Controller    insulin syringe-needle U-100 1 mL 31 gauge x 5/16 Syrg        Review of patient's allergies indicates:  No Known Allergies    Past Medical History:   Diagnosis Date    Arthritis     Asthma     Depression     Diabetes mellitus     Diabetes mellitus, type 2 2000    Elevated PSA     Hypertension      History reviewed. No pertinent surgical history.  Family History     Problem Relation (Age of Onset)    Cataracts Mother, Father, Sister    Glaucoma Mother, Sister, Maternal Aunt        Social History Main Topics    Smoking status: Never Smoker    Smokeless tobacco: Not on file    Alcohol use No    Drug use: No    Sexual activity: Not Currently     Review of Systems   Constitutional: Negative for chills, fever and unexpected weight change.   HENT: Negative for congestion.    Eyes: Negative for visual disturbance.   Respiratory: Negative for shortness " of breath.    Cardiovascular: Positive for chest pain.   Gastrointestinal: Positive for abdominal pain, diarrhea and nausea. Negative for abdominal distention, constipation and vomiting.   Genitourinary: Negative for dysuria.   Musculoskeletal: Negative for arthralgias.   Skin: Negative for rash.   Neurological: Negative for light-headedness.   Hematological: Negative for adenopathy.     Objective:     Vital Signs (Most Recent):  Temp: 97.8 °F (36.6 °C) (03/16/17 1600)  Pulse: 83 (03/16/17 1705)  Resp: 20 (03/16/17 1600)  BP: 109/70 (03/16/17 1600)  SpO2: (!) 93 % (03/16/17 1600) Vital Signs (24h Range):  Temp:  [97.6 °F (36.4 °C)-98.8 °F (37.1 °C)] 97.8 °F (36.6 °C)  Pulse:  [] 83  Resp:  [18-22] 20  SpO2:  [93 %-97 %] 93 %  BP: (102-146)/(62-86) 109/70     Weight: 93.5 kg (206 lb 2.1 oz)  Body mass index is 34.3 kg/(m^2).    Physical Exam   Constitutional: He is oriented to person, place, and time. He appears well-developed and well-nourished.   HENT:   Head: Normocephalic and atraumatic.   Eyes: EOM are normal.   Neck: Normal range of motion. Neck supple.   Cardiovascular: Normal rate and regular rhythm.    Pulmonary/Chest: Effort normal and breath sounds normal.   Abdominal: Soft. Bowel sounds are normal. He exhibits no distension. There is tenderness (TTP throughout epigastric area from RUQ to LUQ also down to RLQ; worse in RUQ, well healed right subcostal incision).   Neurological: He is alert and oriented to person, place, and time.   Skin: Skin is warm and dry.   Vitals reviewed.      Significant Labs:  CBC:   Recent Labs  Lab 03/16/17  0404   WBC 9.18   RBC 4.51*   HGB 13.1*   HCT 38.9*      MCV 86   MCH 29.0   MCHC 33.7     CMP:   Recent Labs  Lab 03/16/17  0404 03/16/17  1137   *  --    CALCIUM 8.5*  --    ALBUMIN  --  3.2*   PROT  --  7.3     --    K 4.2  --    CO2 24  --      --    BUN 12  --    CREATININE 0.9  --    ALKPHOS  --  72   ALT  --  58*   AST  --  44*   BILITOT   --  0.7     Coagulation:   Recent Labs  Lab 03/15/17  1156   INR 1.0   APTT 24.5     Cardiac markers:   Recent Labs  Lab 03/16/17  0021   CPKMB 2.8   TROPONINI 0.106*         Significant Diagnostics:  CTA Chest:  1. No evidence of pulmonary embolism.  2. Patchy groundglass opacities in the bilateral posterior lungs could be related to gravitational change/dependent atelectasis, infectious/inflammatory etiology or less likely pulmonary edema.  No other acute abnormality identified.  3.  Coronary artery calcifications.  4.  Cholelithiasis without evidence of acute cholecystitis.    U/S:  The liver is a upper limits of normal in size and diffusely fatty. No focal liver lesion identified. Gallbladder is nondistended.  No pericholecystic fluid or wall thickening.  Multiple echogenic shadowing gallstones. The common duct measures  4  mm.     Assessment/Plan:     * Cholelithiasis  No signs of inflammation of gallbladder on imaging; although patient reports gallstones removed in prior surgery in the 70's/corresponding abdominal wall incision, there is gallbladder/cholelithiasis evident on imaging  Unclear etiology of abdominal pain as inconsistent with typical gallbladder pain and benign appearing on imaging  Recommend CT abdomen as patient also having diarrhea to eval for other potential etiologies.  If no other source of pain found will consider cholecystectomy; good chance of having to do open procedure with prior surgery.    VTE Risk Mitigation         Ordered     enoxaparin injection 40 mg  Daily     Route:  Subcutaneous        03/15/17 7096     Medium Risk of VTE  Once      03/15/17 2132     Reason for No Pharmacological VTE Prophylaxis  Once      03/15/17 2132          Torin Bishop MD  General Surgery  [facility]     CT Abdomen:  Abdomen CT: 2 mm calculus right kidney.  Negative for hydronephrosis.  Small 1 cm cyst anterior left kidney.  Normal adrenals, spleen, pancreas.  Liver unremarkable.  There  are some calcified gallstones noted without surrounding inflammatory changes.  No biliary ductal dilatation.  The bowel is nonobstructed.  Normal appendix.  No inflammatory changes or fluid collections.  No significant atherosclerosis in the aorta.  Probably injection sites anterior bowel wall.  Pelvis CT: Prostate normal size.  Bladder collapsed.  Mild atherosclerosis iliac vasculature.  Moderate degenerative disc disease L5-S1.      Will make patient npo tonight at midnight for possible surgery tomorrow  Will re-evaluate pain in am and discuss options at that time

## 2017-03-17 NOTE — PLAN OF CARE
Problem: Patient Care Overview  Goal: Plan of Care Review  Outcome: Ongoing (interventions implemented as appropriate)  Free of falls/injury during shift  Pain managed effectively w/ PRN meds  Surgery consulted  NPO for possible gallstone removal  Very dyspneic w/ exertion  2L in place  DM monitored and managed  NSR on telemetry  Safety interventions in place

## 2017-03-17 NOTE — PLAN OF CARE
Pt will maintain vital signs WDL. Pt will maintain a patent airway. Pt will remain in the perioperative care setting until PACU discharge criteria is maintained.

## 2017-03-17 NOTE — TRANSFER OF CARE
"Anesthesia Transfer of Care Note    Patient: Crow Green    Procedure(s) Performed: Procedure(s) (LRB):  CHOLECYSTECTOMY-LAPAROSCOPIC (N/A)    Patient location: PACU    Anesthesia Type: general    Transport from OR: Transported from OR on room air with adequate spontaneous ventilation    Post pain: adequate analgesia    Post assessment: no apparent anesthetic complications    Post vital signs: stable    Level of consciousness: awake    Nausea/Vomiting: nausea and vomiting (phenergan 12.5 mg given upon arrival)    Complications: none          Last vitals:   Visit Vitals    BP (!) 166/95    Pulse 93    Temp 36.8 °C (98.2 °F) (Temporal)    Resp 18    Ht 5' 5" (1.651 m)    Wt 95 kg (209 lb 7 oz)    SpO2 97%    BMI 34.85 kg/m2     "

## 2017-03-17 NOTE — OP NOTE
Operative Note       SURGERY DATE:  3/17/2017     PRE-OP DIAGNOSIS:  Calculus of gallbladder without cholecystitis without obstruction [K80.20]    POST-OP DIAGNOSIS:  Calculus of gallbladder without cholecystitis without obstruction [K80.20]    Procedure(s) (LRB):  CHOLECYSTECTOMY-LAPAROSCOPIC (N/A)    Surgeon(s) and Role:     * Louis O. Jeansonne IV, MD - Primary     * Torin Bishop MD - Assisting    ASSISTANT:  Torin Bishop MD (Assistant needed due to complexity of the case)    TASKS PERFORMED BY ASSISTANT:  Retraction, Exposure, Hemostasis    ANESTHESIA: General    FINDINGS: Chronic cholecystitis with extensive adhesions    GRAFTS/IMPLANTS:  None    ESTIMATED BLOOD LOSS: 10 mL              COMPLICATIONS:  None    SPECIMEN:  Gallbladder    INDICATION:  Cholelithiasis.  The risks of laparoscopic cholecystectomy including bleeding, infection, common bile duct injury, bile leak, injury to abdominal organs, failure to alleviate symptoms, pulmonary embolus, deep vein thrombosis, cardiac event, and possibility of conversion to an open operation were explained to the patient.   The nature of the patient's condition, probability of success, risks of refusing treatment, and alternatives and risks of the alternatives were also explained.  The patient verbalized understanding.       DESCRIPTION OF PROCEDURE:    The patient was placed on the operating table in the supine position. SCDs were placed for DVT prophylaxis and antibiotics were administered within an hour prior to the incision. General anesthesia was induced.  The abdomen was prepped and draped in a sterile fashion.    An incision was made at the umbilicus. The fascia was elevated and the Veress needle was inserted. Proper position was confirmed by aspiration and saline meniscus test. The abdomen was insufflated with carbon dioxide to a pressure of 15 mmHg. The patient tolerated insufflation well. A 12-mm trocar was then inserted.     The laparoscope was inserted and  the abdomen inspected. No injuries from the initial trocar placement were noted. Three 5-mm trocars were then inserted, one in the right epigastrium and two along the right costal margin. The abdomen was inspected and no gross abnormalities were found.  The table was placed in the reverse Trendelenburg position with the right side up.     There were omental adhesions to the anterior abdominal wall along the previous subcostal incision.  These were divided with cautery.  The gallbladder was identified and noted to be chronically inflamed with additional omental adhesions, which were divided with cautery.   The dome of the gallbladder was grasped with an atraumatic grasper passed through the lateral port and retracted over the dome of the liver. The infundibulum was also grasped with an atraumatic grasper through the midclavicular port and retracted toward the right lower quadrant. This maneuver exposed Calot's triangle. The peritoneum overlying the gallbladder infundibulum was then incised and the cystic duct and cystic artery were identified and circumferentially dissected. The cystic duct and cystic artery were then doubly clipped and divided close to the gallbladder.     The gallbladder was then dissected from its peritoneal attachments by electrocautery. Hemostasis was checked and the gallbladder was removed through the umbilical port. The gallbladder was passed off the table as a specimen. The gallbladder fossa was copiously irrigated with saline, and hemostasis was obtained. There was no evidence of bleeding of the gallbladder fossa or cystic artery or leakage of bile from the cystic duct stump.    Secondary trocars were removed under direct vision. No bleeding was noted. The umbilical port fascia was closed with a 0-Vicryl suture. The skin for all ports was closed with 4-0 Monocryl subcuticular sutures.     The patient tolerated the procedure well and was taken to the postanesthesia care unit in stable  condition.             CONDITION: Good    DISPOSITION: PACU - hemodynamically stable.

## 2017-03-17 NOTE — PROGRESS NOTES
"Pt c/o midsternal, constant, 9/10 pain. Describes pain as, "like when I came in." Pt states he was given nitro SL when he came in and it resolved his CP. Nitro SL x 1 given. Pt reports relief of CP. Pt BP stable with MAP >60, see flowsheet. Will continue to monitor.  "

## 2017-03-17 NOTE — PROGRESS NOTES
"[facility] Hospital Medicine  Progress Note    Patient Name: Crow Green  MRN: [id number]  Patient Class: IP- Inpatient   Admission Date: 3/15/2017  Length of Stay: 0 days  Attending Physician: Car Pineda MD  Primary Care Provider: Mateo Lambert DO        Subjective:     Principal Problem:Cholelithiasis    HPI:  The pt is a 60 yo AAM with NICM Ef 40%, nonobstructive CAD ([facility] 11/2016), DM, HTN, Asthma who presented to ED with chest pain and right shoulder pain x 4 days. Pt reports 4 days ago, he had onset of right shoulder pain at rest -worse with arm movement and better with rest. Later that night, he had substernal chest pain while in bed rated 7/10 described as sharp. He took NTG and went to bed. He then woke up and felt chest pain -on /off since then.The pain is worsened with deep breaths, when he attempts to sit up from a laying position. The pt also reports nausea and diarrhea. He had multiple  "watery" stools for the last 4 days that resolved today. Denies SOB, edema, weight gain. He does endorse dizziness- however, he reports dizziness on/off for several months.   In the ED, EKG showed NSR with incomplete LBB. Troponin 0.119>0.108 (last troponin 11/16/16 was 0.205) CTA chest showed no PE, patchy ground glass opacities could be related to gravitational change/dependent atelectasis, infectious/inflammatory etiology or less likely pulmonary edema.       Hospital Course:  With concerns for ACS and chest pain, Cardiology saw the patient and felt pain was non-cardiac in nature and advised General Surgery evaluation for possible symptomatic cholelithiasis. There was a + Heard's sign. CTA of chest suggested cholelithiasis. RUQ ultrasound found cholelithiasis, nondistended gallbladder, no pericholecystic fluid or wall thickening. There were multiple shadowing gallstones. CT of ABD/Pelvis with contrast indicated cholelithiasis.  A cholecystectomy was scheduled for patient. Cardiology stated " that pt had moderate risk for cardiac event during surgery. Subsequently, during Telemetry, intermittent runs of PAF were noted. Toprol XL was increased to 100 mg daily. Transthoracic echocardiogram noted EF of 45 % and + diastolic dysfunction.  Cardiology ordered amiodarone and recommended anticoagulation post surgery.     Interval History:   Seen and examined 9:45 - Pt denies further right sided abdominal pain, nausea or chest pain. Right shoulder pain slightly improved. Diarrhea still present. Presently, NPO for cholecystectomy.     Review of Systems   Constitutional: Positive for fatigue. Negative for appetite change, chills, diaphoresis and fever.   HENT: Negative for congestion, nosebleeds, sore throat and trouble swallowing.    Eyes: Negative for pain, discharge and visual disturbance.   Respiratory: Negative for apnea, cough, chest tightness, shortness of breath, wheezing and stridor.    Cardiovascular: Negative for chest pain, palpitations and leg swelling.   Gastrointestinal: Positive for diarrhea. Negative for abdominal distention, abdominal pain, blood in stool, constipation, nausea and vomiting.   Endocrine: Negative for cold intolerance and heat intolerance.   Genitourinary: Negative for difficulty urinating, dysuria, flank pain, frequency and urgency.   Musculoskeletal: Positive for back pain. Negative for arthralgias, joint swelling, myalgias, neck pain and neck stiffness.        Right shoulder pain    Skin: Negative for rash and wound.   Allergic/Immunologic: Negative for food allergies and immunocompromised state.   Neurological: Negative for dizziness, seizures, syncope, facial asymmetry, weakness, light-headedness and headaches.   Hematological: Negative for adenopathy.   Psychiatric/Behavioral: Negative for agitation, behavioral problems and confusion. The patient is not nervous/anxious.      Objective:     Vital Signs (Most Recent):  Temp: 97.9 °F (36.6 °C) (03/17/17 0757)  Pulse: 84 (03/17/17  0757)  Resp: 18 (03/17/17 0757)  BP: 92/60 (03/17/17 0757)  SpO2: 97 % (03/17/17 0914) Vital Signs (24h Range):  Temp:  [97.6 °F (36.4 °C)-98.6 °F (37 °C)] 97.9 °F (36.6 °C)  Pulse:  [74-92] 84  Resp:  [18-20] 18  SpO2:  [93 %-97 %] 97 %  BP: ()/(50-79) 92/60     Weight: 95 kg (209 lb 7 oz)  Body mass index is 34.85 kg/(m^2).    Intake/Output Summary (Last 24 hours) at 03/17/17 0956  Last data filed at 03/17/17 0600   Gross per 24 hour   Intake           688.75 ml   Output             1125 ml   Net          -436.25 ml      Physical Exam   Constitutional: He is oriented to person, place, and time. He appears well-developed and well-nourished. No distress.   HENT:   Head: Normocephalic and atraumatic.   Eyes: Conjunctivae and EOM are normal. No scleral icterus.   Neck: Normal range of motion. Neck supple.   Cardiovascular: Normal rate, regular rhythm and normal heart sounds.    Pulmonary/Chest: Effort normal. No respiratory distress.   Diminished to bases    Abdominal: Soft. Bowel sounds are normal. He exhibits no distension. There is no tenderness. There is no rebound and negative Heard's sign.   Musculoskeletal: Normal range of motion. He exhibits no edema, tenderness or deformity.   Painful and limited ROM to right shoulder    Neurological: He is alert and oriented to person, place, and time. He exhibits normal muscle tone.   Skin: Skin is warm and dry. No rash noted. He is not diaphoretic. No erythema. No pallor.   Psychiatric: He has a normal mood and affect. His behavior is normal. Thought content normal.   Nursing note and vitals reviewed.      Significant Labs:   CBC:   Recent Labs  Lab 03/15/17  1156 03/16/17  0404   WBC 10.04 9.18   HGB 14.1 13.1*   HCT 42.7 38.9*    158     CMP:   Recent Labs  Lab 03/15/17  1156 03/16/17  0404 03/16/17  1137    138  --    K 4.2 4.2  --     107  --    CO2 20* 24  --    * 203*  --    BUN 13 12  --    CREATININE 0.9 0.9  --    CALCIUM 8.7 8.5*   --    PROT 7.9  --  7.3   ALBUMIN 3.5  --  3.2*   BILITOT 0.7  --  0.7   ALKPHOS 76  --  72   AST 43*  --  44*   ALT 53*  --  58*   ANIONGAP 13 7*  --    EGFRNONAA >60.0 >60  --      All pertinent labs within the past 24 hours have been reviewed.    Significant Imaging: I have reviewed all pertinent imaging results/findings within the past 24 hours.    Assessment/Plan:      * Cholelithiasis  RUQ pain has improved - General Surgery to perform Cholecystectomy 3/17/2017    Multiple gallstones according to US  No signs of cholecystitis    CT of ABD - multiple shadowing stones      Chest pain   ASA, BB Toprol dose increased, ACE lisinopril dose increased, Statin,   Cardiology stated pt with [facility] in 11/2016  1. Non-obstructive CAD.   2.   Non-ischemic Cardiomyopathy    Cardiology advised noncardiac pain      PAF (paroxysmal atrial fibrillation)  Telemetry monitoring  Toprol XL and amiodarone  Will need anticoagulation after surgery      Hypertension  Norvasc, Toprol increased to 100 mg and lisinopril increased to 10 mg      Type 2 diabetes mellitus with hyperglycemia  NISS      Chronic combined systolic and diastolic congestive heart failure  Compensated  Gentle IV hydration  Cont ACE      VTE Risk Mitigation         Ordered     enoxaparin injection 40 mg  Daily     Route:  Subcutaneous        03/15/17 4790     Medium Risk of VTE  Once      03/15/17 2132     Reason for No Pharmacological VTE Prophylaxis  Once      03/15/17 2132          Katey Chan NP  Department of Hospital Medicine   [facility]

## 2017-03-17 NOTE — PROGRESS NOTES
Received pt. From PACU via stretcher, No signs of distress are noted. 4 lap sites JOCY. Bedside report given by MAYNOR Ambrocio.

## 2017-03-17 NOTE — ANESTHESIA PREPROCEDURE EVALUATION
03/17/2017  Crow Green is a 61 y.o., male.    [facility] Anesthesia Evaluation    I have reviewed the Patient Summary Reports.        Review of Systems  Anesthesia Hx:  Denies Family Hx of Anesthesia complications.   Denies Personal Hx of Anesthesia complications.   Cardiovascular:   Hypertension CAD   CHF    Pulmonary:   Denies Pneumonia Asthma Sleep Apnea    Renal/:   Denies Chronic Renal Disease.     Hepatic/GI:   GERD    Neurological:   Denies CVA. Denies Seizures.    Endocrine:   Diabetes, type 2    Psych:   Psychiatric History          Physical Exam  General:  Well nourished    Airway/Jaw/Neck:  Airway Findings: General Airway Assessment: Adult Mallampati: III       Chest/Lungs:  Chest/Lungs Findings: Clear to auscultation, Normal Respiratory Rate     Heart/Vascular:  Heart Findings: Rate: Normal  Rhythm: Regular Rhythm  Sounds: Normal             Anesthesia Plan  Type of Anesthesia, risks & benefits discussed:  Anesthesia Type:  general  Patient's Preference:   Intra-op Monitoring Plan:   Intra-op Monitoring Plan Comments:   Post Op Pain Control Plan:   Post Op Pain Control Plan Comments:   Induction:    Beta Blocker:  Patient is on a Beta-Blocker and has received one dose within the past 24 hours (No further documentation required).       Informed Consent: Patient understands risks and agrees with Anesthesia plan.  Questions answered.   ASA Score: 3     Day of Surgery Review of History & Physical:            Ready For Surgery From Anesthesia Perspective.

## 2017-03-17 NOTE — ASSESSMENT & PLAN NOTE
ASA, BB Toprol dose increased, ACE lisinopril dose increased, Statin,   Cardiology stated pt with [facility] in 11/2016  1. Non-obstructive CAD.   2.   Non-ischemic Cardiomyopathy    Cardiology advised noncardiac pain

## 2017-03-17 NOTE — SUBJECTIVE & OBJECTIVE
Interval History:   Seen and examined 9:45 - Pt denies further right sided abdominal pain, nausea or chest pain. Right shoulder pain slightly improved. Diarrhea still present. Presently, NPO for cholecystectomy.     Review of Systems   Constitutional: Positive for fatigue. Negative for appetite change, chills, diaphoresis and fever.   HENT: Negative for congestion, nosebleeds, sore throat and trouble swallowing.    Eyes: Negative for pain, discharge and visual disturbance.   Respiratory: Negative for apnea, cough, chest tightness, shortness of breath, wheezing and stridor.    Cardiovascular: Negative for chest pain, palpitations and leg swelling.   Gastrointestinal: Positive for diarrhea. Negative for abdominal distention, abdominal pain, blood in stool, constipation, nausea and vomiting.   Endocrine: Negative for cold intolerance and heat intolerance.   Genitourinary: Negative for difficulty urinating, dysuria, flank pain, frequency and urgency.   Musculoskeletal: Positive for back pain. Negative for arthralgias, joint swelling, myalgias, neck pain and neck stiffness.        Right shoulder pain    Skin: Negative for rash and wound.   Allergic/Immunologic: Negative for food allergies and immunocompromised state.   Neurological: Negative for dizziness, seizures, syncope, facial asymmetry, weakness, light-headedness and headaches.   Hematological: Negative for adenopathy.   Psychiatric/Behavioral: Negative for agitation, behavioral problems and confusion. The patient is not nervous/anxious.      Objective:     Vital Signs (Most Recent):  Temp: 97.9 °F (36.6 °C) (03/17/17 0757)  Pulse: 84 (03/17/17 0757)  Resp: 18 (03/17/17 0757)  BP: 92/60 (03/17/17 0757)  SpO2: 97 % (03/17/17 0914) Vital Signs (24h Range):  Temp:  [97.6 °F (36.4 °C)-98.6 °F (37 °C)] 97.9 °F (36.6 °C)  Pulse:  [74-92] 84  Resp:  [18-20] 18  SpO2:  [93 %-97 %] 97 %  BP: ()/(50-79) 92/60     Weight: 95 kg (209 lb 7 oz)  Body mass index is 34.85  kg/(m^2).    Intake/Output Summary (Last 24 hours) at 03/17/17 0956  Last data filed at 03/17/17 0600   Gross per 24 hour   Intake           688.75 ml   Output             1125 ml   Net          -436.25 ml      Physical Exam   Constitutional: He is oriented to person, place, and time. He appears well-developed and well-nourished. No distress.   HENT:   Head: Normocephalic and atraumatic.   Eyes: Conjunctivae and EOM are normal. No scleral icterus.   Neck: Normal range of motion. Neck supple.   Cardiovascular: Normal rate, regular rhythm and normal heart sounds.    Pulmonary/Chest: Effort normal. No respiratory distress.   Diminished to bases    Abdominal: Soft. Bowel sounds are normal. He exhibits no distension. There is no tenderness. There is no rebound and negative Heard's sign.   Musculoskeletal: Normal range of motion. He exhibits no edema, tenderness or deformity.   Painful and limited ROM to right shoulder    Neurological: He is alert and oriented to person, place, and time. He exhibits normal muscle tone.   Skin: Skin is warm and dry. No rash noted. He is not diaphoretic. No erythema. No pallor.   Psychiatric: He has a normal mood and affect. His behavior is normal. Thought content normal.   Nursing note and vitals reviewed.      Significant Labs:   CBC:   Recent Labs  Lab 03/15/17  1156 03/16/17  0404   WBC 10.04 9.18   HGB 14.1 13.1*   HCT 42.7 38.9*    158     CMP:   Recent Labs  Lab 03/15/17  1156 03/16/17  0404 03/16/17  1137    138  --    K 4.2 4.2  --     107  --    CO2 20* 24  --    * 203*  --    BUN 13 12  --    CREATININE 0.9 0.9  --    CALCIUM 8.7 8.5*  --    PROT 7.9  --  7.3   ALBUMIN 3.5  --  3.2*   BILITOT 0.7  --  0.7   ALKPHOS 76  --  72   AST 43*  --  44*   ALT 53*  --  58*   ANIONGAP 13 7*  --    EGFRNONAA >60.0 >60  --      All pertinent labs within the past 24 hours have been reviewed.    Significant Imaging: I have reviewed all pertinent imaging  results/findings within the past 24 hours.

## 2017-03-17 NOTE — ASSESSMENT & PLAN NOTE
RUQ pain has improved - General Surgery to perform Cholecystectomy 3/17/2017    Multiple gallstones according to US  No signs of cholecystitis    CT of ABD - multiple shadowing stones

## 2017-03-17 NOTE — PROGRESS NOTES
Cardiology Progress Note        SUBJECTIVE:     History of Present Illness:  Patient is a 61 y.o. male who presents with atypical chest pain, RUQ tenderness, PAF, and elevated troponin. Patient seen and examined today, resting in bed. No recurrence of chest discomfort. Reports RUQ tenderness has improved. Labs reviewed Troponin has trended down. General surgery on board, planning for possible cholecystectomy today. Review of overnight monitor shows runs of PAF. 2D echo reviewed and shows EF of 45%, DD, unchanged from previous.       OBJECTIVE:     Vital Signs (Most Recent)  Temp: 97.9 °F (36.6 °C) (03/17/17 0757)  Pulse: 84 (03/17/17 0757)  Resp: 18 (03/17/17 0757)  BP: 92/60 (03/17/17 0757)  SpO2: 97 % (03/17/17 0914)    Vital Signs Range (Last 24H):  Temp:  [97.6 °F (36.4 °C)-98.6 °F (37 °C)]   Pulse:  [74-92]   Resp:  [18-20]   BP: ()/(50-79)   SpO2:  [93 %-97 %]     Intake/Output last 3 shifts:  I/O last 3 completed shifts:  In: 1048.8 [P.O.:660; I.V.:388.8]  Out: 1275 [Urine:1275]    Intake/Output this shift:       Review of patient's allergies indicates:  No Known Allergies    Current Facility-Administered Medications   Medication    amlodipine tablet 10 mg    aspirin EC tablet 81 mg    atorvastatin tablet 40 mg    dextrose 5 % and 0.45 % NaCl with KCl 20 mEq infusion    dextrose 50% injection 12.5 g    dextrose 50% injection 25 g    enoxaparin injection 40 mg    gabapentin capsule 300 mg    glucagon (human recombinant) injection 1 mg    glucose chewable tablet 16 g    glucose chewable tablet 24 g    insulin aspart pen 0-5 Units    ipratropium 0.02 % nebulizer solution 0.5 mg    lisinopril tablet 10 mg    metoprolol succinate (TOPROL-XL) 24 hr tablet 100 mg    nitroGLYCERIN SL tablet 0.4 mg    ondansetron injection 4 mg    pantoprazole EC tablet 40 mg    promethazine (PHENERGAN) 12.5 mg in dextrose 5 % 50 mL IVPB    sertraline tablet 50 mg    tramadol tablet 50 mg     No current  facility-administered medications on file prior to encounter.      Current Outpatient Prescriptions on File Prior to Encounter   Medication Sig    albuterol 90 mcg/actuation inhaler Inhale 2 puffs into the lungs every 6 (six) hours as needed for Wheezing.    amlodipine (NORVASC) 10 MG tablet Take 1 tablet (10 mg total) by mouth once daily.    aspirin (ECOTRIN) 81 MG EC tablet Take 1 tablet (81 mg total) by mouth once daily.    atorvastatin (LIPITOR) 40 MG tablet Take 1 tablet (40 mg total) by mouth every evening.    blood sugar diagnostic Strp 1 strip by Misc.(Non-Drug; Combo Route) route 6 (six) times daily.    blood-glucose meter (CONTOUR NEXT EZ METER) kit Use as instructed    fluticasone-salmeterol 100-50 mcg/dose (ADVAIR DISKUS) 100-50 mcg/dose diskus inhaler Inhale 1 puff into the lungs 2 (two) times daily. Controller    gabapentin (NEURONTIN) 300 MG capsule Take 1 capsule (300 mg total) by mouth 3 (three) times daily.    insulin glargine (LANTUS SOLOSTAR) 100 unit/mL (3 mL) InPn pen Inject 50 Units into the skin every evening.    insulin lispro (HUMALOG KWIKPEN) 100 unit/mL InPn pen 10 units subcutaneously twice times a day 10-15 min before meals    latanoprost 0.005 % ophthalmic solution Place 1 drop into both eyes every evening.    lisinopril (PRINIVIL,ZESTRIL) 5 MG tablet Take 1 tablet (5 mg total) by mouth once daily.    metformin (GLUCOPHAGE) 1000 MG tablet Take 1 tablet (1,000 mg total) by mouth 2 (two) times daily with meals.    metoprolol succinate (TOPROL-XL) 50 MG 24 hr tablet TAKE 1 TABLET EVERY DAY    naproxen (EC NAPROSYN) 500 MG EC tablet TAKE 1 TABLET(500 MG) BY MOUTH TWICE DAILY    nitroGLYCERIN (NITROSTAT) 0.3 MG SL tablet Place 1 tablet (0.3 mg total) under the tongue every 5 (five) minutes as needed for Chest pain. No more than 3 tablets in one day.    pantoprazole (PROTONIX) 40 MG tablet Take 1 tablet (40 mg total) by mouth once daily.    pen needle, diabetic 31 gauge x  "5/16" Ndle Inject 1 pen into the skin 2 (two) times daily. Use one 2 times a day    sertraline (ZOLOFT) 50 MG tablet Take 1 tablet (50 mg total) by mouth once daily.    tiotropium (SPIRIVA WITH HANDIHALER) 18 mcg inhalation capsule Inhale 1 capsule (18 mcg total) into the lungs once daily. Controller    insulin syringe-needle U-100 1 mL 31 gauge x 5/16 Syrg        Physical Exam:  General appearance: alert, appears stated age and cooperative  Head: Normocephalic, without obvious abnormality, atraumatic  Neck: no adenopathy, no carotid bruit, no JVD,  Lungs:  clear to auscultation bilaterally  Chest wall: no tenderness  Heart: regular rate and rhythm, S1, S2 normal, no murmur, click, rub or gallop, +PACs  Abdomen: soft, no tenderness today  Extremities: extremities normal, atraumatic, no cyanosis or edema  Skin: Skin color, texture, turgor normal. No rashes or lesions  Neurologic: Grossly normal, AAO x 3    Laboratory:  Chemistry:   Lab Results   Component Value Date     03/16/2017    K 4.2 03/16/2017     03/16/2017    CO2 24 03/16/2017    BUN 12 03/16/2017    CREATININE 0.9 03/16/2017    CALCIUM 8.5 (L) 03/16/2017     Cardiac Markers:   Lab Results   Component Value Date    TROPONINI 0.106 (H) 03/16/2017     Cardiac Markers (Last 3):   Lab Results   Component Value Date    TROPONINI 0.106 (H) 03/16/2017    TROPONINI 0.108 (H) 03/15/2017    TROPONINI 0.119 (H) 03/15/2017     CBC:   Lab Results   Component Value Date    WBC 9.18 03/16/2017    HGB 13.1 (L) 03/16/2017    HCT 38.9 (L) 03/16/2017    MCV 86 03/16/2017     03/16/2017     Lipids:   Lab Results   Component Value Date    CHOL 189 11/16/2016    TRIG 134 11/16/2016    HDL 42 11/16/2016     Coagulation:   Lab Results   Component Value Date    INR 1.0 03/15/2017    APTT 24.5 03/15/2017       Diagnostic Results:  ECG: Reviewed  X-Ray: Reviewed  Echo: Reviewed      ASSESSMENT/PLAN:     Patient Active Problem List   Diagnosis    ED (erectile " dysfunction)    Testicular hypogonadism    Non-proliferative diabetic retinopathy, mild, both eyes    Hypertension    Diabetic polyneuropathy    Type 2 diabetes mellitus with hyperglycemia    Abnormal nuclear stress test    Coronary artery disease involving native coronary artery without angina pectoris    Chronic combined systolic and diastolic congestive heart failure    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, with long-term current use of insulin    Obstructive sleep apnea syndrome    Asthma    Delayed sleep phase syndrome    Psychophysiological insomnia    Nightmares    Sleep related gastroesophageal reflux disease    Inadequate sleep hygiene    Chest pain    Cholelithiasis    PAF (paroxysmal atrial fibrillation)      Patient who presents with atypical chest pain, elevated troponin, and PAF. Cardiac wise, remains stable. Troponin has trended down and patient with recent LHC in 12/16 that showed non-obstructive CAD. Continues to have PAF, will start on po amio and continue BB and plan to start anticoagulation post-surgery. Ok to proceed with surgery at moderate risk of megha and post op CV complications.   Plan:   -Continue ASA, statin, BB, ACEI  -Amiodarone 400 mg now  -Ok to proceed with surgery at moderate risk  -Keep BB on board perioperatively  -Will need anticoagulation post-surgery    Chart reviewed. Dr. Bolanos examined patient and agrees with plan as outlined above.

## 2017-03-17 NOTE — ASSESSMENT & PLAN NOTE
No signs of inflammation of gallbladder on imaging; although patient reports gallstones removed in prior surgery in the 70's/corresponding abdominal wall incision, there is gallbladder/cholelithiasis evident on imaging  Unclear etiology of abdominal pain as inconsistent with typical gallbladder pain and benign appearing on imaging  Recommend CT abdomen as patient also having diarrhea to eval for other potential etiologies.  If no other source of pain found will consider cholecystectomy; good chance of having to do open procedure with prior surgery.

## 2017-03-17 NOTE — PLAN OF CARE
Problem: Breathing Pattern Ineffective (Adult)  Goal: Identify Related Risk Factors and Signs and Symptoms  Related risk factors and signs and symptoms are identified upon initiation of Human Response Clinical Practice Guideline (CPG)   Outcome: Ongoing (interventions implemented as appropriate)  Tolerates breathing txs well.

## 2017-03-18 PROBLEM — T17.908A ASPIRATION INTO AIRWAY: Status: ACTIVE | Noted: 2017-03-18

## 2017-03-18 PROBLEM — J81.0 FLASH PULMONARY EDEMA: Status: ACTIVE | Noted: 2017-03-18

## 2017-03-18 PROBLEM — J96.01 ACUTE RESPIRATORY FAILURE WITH HYPOXIA: Status: ACTIVE | Noted: 2017-03-18

## 2017-03-18 LAB
ANION GAP SERPL CALC-SCNC: 8 MMOL/L
BASOPHILS # BLD AUTO: 0 K/UL
BASOPHILS NFR BLD: 0 %
BNP SERPL-MCNC: 144 PG/ML
BUN SERPL-MCNC: 9 MG/DL
CALCIUM SERPL-MCNC: 8.3 MG/DL
CHLORIDE SERPL-SCNC: 104 MMOL/L
CO2 SERPL-SCNC: 24 MMOL/L
CREAT SERPL-MCNC: 0.9 MG/DL
DIFFERENTIAL METHOD: ABNORMAL
EOSINOPHIL # BLD AUTO: 0 K/UL
EOSINOPHIL NFR BLD: 0.3 %
ERYTHROCYTE [DISTWIDTH] IN BLOOD BY AUTOMATED COUNT: 13.6 %
EST. GFR  (AFRICAN AMERICAN): >60 ML/MIN/1.73 M^2
EST. GFR  (NON AFRICAN AMERICAN): >60 ML/MIN/1.73 M^2
GLUCOSE SERPL-MCNC: 206 MG/DL
HCT VFR BLD AUTO: 37.7 %
HGB BLD-MCNC: 12.5 G/DL
LYMPHOCYTES # BLD AUTO: 1.7 K/UL
LYMPHOCYTES NFR BLD: 12.4 %
MAGNESIUM SERPL-MCNC: 1.8 MG/DL
MCH RBC QN AUTO: 28.8 PG
MCHC RBC AUTO-ENTMCNC: 33.2 %
MCV RBC AUTO: 87 FL
MONOCYTES # BLD AUTO: 1 K/UL
MONOCYTES NFR BLD: 7.3 %
NEUTROPHILS # BLD AUTO: 10.7 K/UL
NEUTROPHILS NFR BLD: 80 %
PLATELET # BLD AUTO: 151 K/UL
PMV BLD AUTO: 9.9 FL
POCT GLUCOSE: 168 MG/DL (ref 70–110)
POCT GLUCOSE: 173 MG/DL (ref 70–110)
POCT GLUCOSE: 201 MG/DL (ref 70–110)
POCT GLUCOSE: 210 MG/DL (ref 70–110)
POTASSIUM SERPL-SCNC: 4.5 MMOL/L
RBC # BLD AUTO: 4.34 M/UL
SODIUM SERPL-SCNC: 136 MMOL/L
TROPONIN I SERPL DL<=0.01 NG/ML-MCNC: 0.09 NG/ML
WBC # BLD AUTO: 13.36 K/UL

## 2017-03-18 PROCEDURE — 63600175 PHARM REV CODE 636 W HCPCS: Performed by: EMERGENCY MEDICINE

## 2017-03-18 PROCEDURE — 25000003 PHARM REV CODE 250: Performed by: EMERGENCY MEDICINE

## 2017-03-18 PROCEDURE — 27000221 HC OXYGEN, UP TO 24 HOURS

## 2017-03-18 PROCEDURE — 36415 COLL VENOUS BLD VENIPUNCTURE: CPT

## 2017-03-18 PROCEDURE — 25000003 PHARM REV CODE 250: Performed by: PHYSICIAN ASSISTANT

## 2017-03-18 PROCEDURE — 25000242 PHARM REV CODE 250 ALT 637 W/ HCPCS: Performed by: NURSE PRACTITIONER

## 2017-03-18 PROCEDURE — 99292 CRITICAL CARE ADDL 30 MIN: CPT | Mod: ,,, | Performed by: INTERNAL MEDICINE

## 2017-03-18 PROCEDURE — 63600175 PHARM REV CODE 636 W HCPCS: Performed by: NURSE PRACTITIONER

## 2017-03-18 PROCEDURE — 94799 UNLISTED PULMONARY SVC/PX: CPT

## 2017-03-18 PROCEDURE — 25000003 PHARM REV CODE 250: Performed by: SURGERY

## 2017-03-18 PROCEDURE — 93010 ELECTROCARDIOGRAM REPORT: CPT | Mod: ,,, | Performed by: INTERNAL MEDICINE

## 2017-03-18 PROCEDURE — 85025 COMPLETE CBC W/AUTO DIFF WBC: CPT

## 2017-03-18 PROCEDURE — 20000000 HC ICU ROOM

## 2017-03-18 PROCEDURE — 83735 ASSAY OF MAGNESIUM: CPT

## 2017-03-18 PROCEDURE — 94760 N-INVAS EAR/PLS OXIMETRY 1: CPT

## 2017-03-18 PROCEDURE — 25000003 PHARM REV CODE 250: Performed by: NURSE PRACTITIONER

## 2017-03-18 PROCEDURE — 63600175 PHARM REV CODE 636 W HCPCS: Performed by: SURGERY

## 2017-03-18 PROCEDURE — 94660 CPAP INITIATION&MGMT: CPT

## 2017-03-18 PROCEDURE — 27000190 HC CPAP FULL FACE MASK W/VALVE

## 2017-03-18 PROCEDURE — 99291 CRITICAL CARE FIRST HOUR: CPT | Mod: ,,, | Performed by: NURSE PRACTITIONER

## 2017-03-18 PROCEDURE — 80048 BASIC METABOLIC PNL TOTAL CA: CPT

## 2017-03-18 PROCEDURE — 94640 AIRWAY INHALATION TREATMENT: CPT

## 2017-03-18 PROCEDURE — 25000003 PHARM REV CODE 250: Performed by: INTERNAL MEDICINE

## 2017-03-18 PROCEDURE — 84484 ASSAY OF TROPONIN QUANT: CPT

## 2017-03-18 PROCEDURE — 83880 ASSAY OF NATRIURETIC PEPTIDE: CPT

## 2017-03-18 PROCEDURE — 99900035 HC TECH TIME PER 15 MIN (STAT)

## 2017-03-18 RX ORDER — IPRATROPIUM BROMIDE AND ALBUTEROL SULFATE 2.5; .5 MG/3ML; MG/3ML
3 SOLUTION RESPIRATORY (INHALATION) EVERY 6 HOURS
Status: DISCONTINUED | OUTPATIENT
Start: 2017-03-18 | End: 2017-03-20 | Stop reason: HOSPADM

## 2017-03-18 RX ORDER — IBUPROFEN 200 MG
24 TABLET ORAL
Status: DISCONTINUED | OUTPATIENT
Start: 2017-03-18 | End: 2017-03-20 | Stop reason: HOSPADM

## 2017-03-18 RX ORDER — IBUPROFEN 200 MG
16 TABLET ORAL
Status: DISCONTINUED | OUTPATIENT
Start: 2017-03-18 | End: 2017-03-20 | Stop reason: HOSPADM

## 2017-03-18 RX ORDER — ARFORMOTEROL TARTRATE 15 UG/2ML
15 SOLUTION RESPIRATORY (INHALATION) 2 TIMES DAILY
Status: DISCONTINUED | OUTPATIENT
Start: 2017-03-18 | End: 2017-03-20 | Stop reason: HOSPADM

## 2017-03-18 RX ORDER — INSULIN ASPART 100 [IU]/ML
1-10 INJECTION, SOLUTION INTRAVENOUS; SUBCUTANEOUS
Status: DISCONTINUED | OUTPATIENT
Start: 2017-03-18 | End: 2017-03-20 | Stop reason: HOSPADM

## 2017-03-18 RX ORDER — LATANOPROST 50 UG/ML
1 SOLUTION/ DROPS OPHTHALMIC NIGHTLY
Status: DISCONTINUED | OUTPATIENT
Start: 2017-03-18 | End: 2017-03-20 | Stop reason: HOSPADM

## 2017-03-18 RX ORDER — FUROSEMIDE 10 MG/ML
40 INJECTION INTRAMUSCULAR; INTRAVENOUS 2 TIMES DAILY
Status: DISCONTINUED | OUTPATIENT
Start: 2017-03-18 | End: 2017-03-19

## 2017-03-18 RX ORDER — METRONIDAZOLE 500 MG/100ML
500 INJECTION, SOLUTION INTRAVENOUS
Status: DISCONTINUED | OUTPATIENT
Start: 2017-03-18 | End: 2017-03-20 | Stop reason: HOSPADM

## 2017-03-18 RX ORDER — CEFEPIME HYDROCHLORIDE 1 G/50ML
1 INJECTION, SOLUTION INTRAVENOUS
Status: DISCONTINUED | OUTPATIENT
Start: 2017-03-18 | End: 2017-03-20 | Stop reason: HOSPADM

## 2017-03-18 RX ORDER — INSULIN ASPART 100 [IU]/ML
0-5 INJECTION, SOLUTION INTRAVENOUS; SUBCUTANEOUS
Status: DISCONTINUED | OUTPATIENT
Start: 2017-03-18 | End: 2017-03-18

## 2017-03-18 RX ORDER — MORPHINE SULFATE 2 MG/ML
2 INJECTION, SOLUTION INTRAMUSCULAR; INTRAVENOUS
Status: DISCONTINUED | OUTPATIENT
Start: 2017-03-18 | End: 2017-03-20 | Stop reason: HOSPADM

## 2017-03-18 RX ORDER — BUDESONIDE 0.5 MG/2ML
0.5 INHALANT ORAL 2 TIMES DAILY
Status: DISCONTINUED | OUTPATIENT
Start: 2017-03-18 | End: 2017-03-20 | Stop reason: HOSPADM

## 2017-03-18 RX ORDER — GLUCAGON 1 MG
1 KIT INJECTION
Status: DISCONTINUED | OUTPATIENT
Start: 2017-03-18 | End: 2017-03-20 | Stop reason: HOSPADM

## 2017-03-18 RX ADMIN — ENOXAPARIN SODIUM 40 MG: 100 INJECTION SUBCUTANEOUS at 12:03

## 2017-03-18 RX ADMIN — SERTRALINE HYDROCHLORIDE 50 MG: 50 TABLET, FILM COATED ORAL at 08:03

## 2017-03-18 RX ADMIN — BUDESONIDE 0.5 MG: 0.5 SUSPENSION RESPIRATORY (INHALATION) at 07:03

## 2017-03-18 RX ADMIN — METRONIDAZOLE 500 MG: 500 INJECTION, SOLUTION INTRAVENOUS at 03:03

## 2017-03-18 RX ADMIN — IPRATROPIUM BROMIDE 0.5 MG: 0.5 SOLUTION RESPIRATORY (INHALATION) at 08:03

## 2017-03-18 RX ADMIN — IPRATROPIUM BROMIDE 0.5 MG: 0.5 SOLUTION RESPIRATORY (INHALATION) at 12:03

## 2017-03-18 RX ADMIN — RACEPINEPHRINE HYDROCHLORIDE 0.5 ML: 11.25 SOLUTION RESPIRATORY (INHALATION) at 02:03

## 2017-03-18 RX ADMIN — DEXTROSE MONOHYDRATE, SODIUM CHLORIDE, AND POTASSIUM CHLORIDE: 50; 4.5; 1.49 INJECTION, SOLUTION INTRAVENOUS at 09:03

## 2017-03-18 RX ADMIN — INSULIN DETEMIR 12 UNITS: 100 INJECTION, SOLUTION SUBCUTANEOUS at 10:03

## 2017-03-18 RX ADMIN — AMLODIPINE BESYLATE 10 MG: 10 TABLET ORAL at 08:03

## 2017-03-18 RX ADMIN — IPRATROPIUM BROMIDE AND ALBUTEROL SULFATE 3 ML: .5; 3 SOLUTION RESPIRATORY (INHALATION) at 06:03

## 2017-03-18 RX ADMIN — MORPHINE SULFATE 2 MG: 2 INJECTION, SOLUTION INTRAMUSCULAR; INTRAVENOUS at 03:03

## 2017-03-18 RX ADMIN — ARFORMOTEROL TARTRATE 15 MCG: 15 SOLUTION RESPIRATORY (INHALATION) at 07:03

## 2017-03-18 RX ADMIN — INSULIN ASPART 1 UNITS: 100 INJECTION, SOLUTION INTRAVENOUS; SUBCUTANEOUS at 05:03

## 2017-03-18 RX ADMIN — METRONIDAZOLE 500 MG: 500 INJECTION, SOLUTION INTRAVENOUS at 11:03

## 2017-03-18 RX ADMIN — MORPHINE SULFATE 2 MG: 10 INJECTION, SOLUTION INTRAMUSCULAR; INTRAVENOUS at 08:03

## 2017-03-18 RX ADMIN — HYDROCODONE BITARTRATE AND ACETAMINOPHEN 1 TABLET: 5; 325 TABLET ORAL at 10:03

## 2017-03-18 RX ADMIN — GABAPENTIN 300 MG: 300 CAPSULE ORAL at 05:03

## 2017-03-18 RX ADMIN — METOPROLOL SUCCINATE 100 MG: 50 TABLET, EXTENDED RELEASE ORAL at 08:03

## 2017-03-18 RX ADMIN — ATORVASTATIN CALCIUM 40 MG: 40 TABLET, FILM COATED ORAL at 09:03

## 2017-03-18 RX ADMIN — FUROSEMIDE 40 MG: 10 INJECTION, SOLUTION INTRAMUSCULAR; INTRAVENOUS at 02:03

## 2017-03-18 RX ADMIN — NITROGLYCERIN 0.4 MG: 0.4 TABLET SUBLINGUAL at 01:03

## 2017-03-18 RX ADMIN — MORPHINE SULFATE: 10 INJECTION, SOLUTION INTRAMUSCULAR; INTRAVENOUS at 01:03

## 2017-03-18 RX ADMIN — LATANOPROST 1 DROP: 50 SOLUTION OPHTHALMIC at 11:03

## 2017-03-18 RX ADMIN — GABAPENTIN 300 MG: 300 CAPSULE ORAL at 10:03

## 2017-03-18 RX ADMIN — CEFEPIME HYDROCHLORIDE 1 G: 1 INJECTION, POWDER, FOR SOLUTION INTRAMUSCULAR; INTRAVENOUS at 03:03

## 2017-03-18 RX ADMIN — PANTOPRAZOLE SODIUM 40 MG: 40 TABLET, DELAYED RELEASE ORAL at 08:03

## 2017-03-18 RX ADMIN — TRAMADOL HYDROCHLORIDE 50 MG: 50 TABLET, COATED ORAL at 11:03

## 2017-03-18 RX ADMIN — LISINOPRIL 10 MG: 10 TABLET ORAL at 08:03

## 2017-03-18 RX ADMIN — IPRATROPIUM BROMIDE 0.5 MG: 0.5 SOLUTION RESPIRATORY (INHALATION) at 01:03

## 2017-03-18 NOTE — SUBJECTIVE & OBJECTIVE
Interval History: status post lap cholecystectomy    Medications:  Continuous Infusions:   dextrose 5 % and 0.45 % NaCl with KCl 20 mEq 125 mL/hr at 03/18/17 1213     Scheduled Meds:   amlodipine  10 mg Oral Daily    atorvastatin  40 mg Oral QHS    enoxaparin  40 mg Subcutaneous Daily    gabapentin  300 mg Oral TID    ipratropium  0.5 mg Nebulization Q6H    lisinopril  10 mg Oral Daily    metoprolol succinate  100 mg Oral Daily    pantoprazole  40 mg Oral Daily    sertraline  50 mg Oral Daily     PRN Meds:ceFAZolin (ANCEF) IVPB, dextrose 50%, dextrose 50%, glucagon (human recombinant), glucose, glucose, hydrocodone-acetaminophen 5-325mg, insulin aspart, morphine, nitroGLYCERIN, ondansetron, promethazine (PHENERGAN) IVPB, tramadol     Review of patient's allergies indicates:  No Known Allergies  Objective:     Vital Signs (Most Recent):  Temp: 99 °F (37.2 °C) (03/18/17 0752)  Pulse: 96 (03/18/17 1139)  Resp: 18 (03/18/17 1139)  BP: 132/86 (03/18/17 1139)  SpO2: (!) 94 % (03/18/17 1139) Vital Signs (24h Range):  Temp:  [97.2 °F (36.2 °C)-99 °F (37.2 °C)] 99 °F (37.2 °C)  Pulse:  [] 96  Resp:  [16-20] 18  SpO2:  [93 %-100 %] 94 %  BP: (123-166)/(66-96) 132/86     Weight: 95 kg (209 lb 7 oz)  Body mass index is 34.85 kg/(m^2).    Intake/Output - Last 3 Shifts       03/16 0700 - 03/17 0659 03/17 0700 - 03/18 0659 03/18 0700 - 03/19 0659    P.O. 300 180     I.V. (mL/kg) 388.8 (4.1) 3000 (31.6)     IV Piggyback  50     Total Intake(mL/kg) 688.8 (7.3) 3230 (34)     Urine (mL/kg/hr) 1125 (0.5)  500 (1)    Blood  10 (0)     Total Output 1125 10 500    Net -436.3 +3220 -500           Urine Occurrence  1 x     Stool Occurrence 0 x            Physical Exam   Constitutional: He is oriented to person, place, and time. He appears well-developed and well-nourished.   HENT:   Head: Normocephalic.   Right Ear: External ear normal.   Left Ear: External ear normal.   Nose: Nose normal.   Eyes: Pupils are equal, round,  and reactive to light. No scleral icterus.   Neck: Normal range of motion. Neck supple. No thyromegaly present.   Cardiovascular: Normal rate, regular rhythm and normal heart sounds.    No murmur heard.  Pulmonary/Chest: Effort normal and breath sounds normal.   Abdominal: Soft. Bowel sounds are normal. He exhibits distension. There is tenderness. There is no guarding.   Musculoskeletal: Normal range of motion.   Lymphadenopathy:     He has no cervical adenopathy.   Neurological: He is alert and oriented to person, place, and time.   Skin: Skin is warm and dry.       Significant Labs:  CBC:   Recent Labs  Lab 03/18/17  0515   WBC 13.36*   RBC 4.34*   HGB 12.5*   HCT 37.7*      MCV 87   MCH 28.8   MCHC 33.2     BMP:   Recent Labs  Lab 03/18/17  0515   *      K 4.5      CO2 24   BUN 9   CREATININE 0.9   CALCIUM 8.3*   MG 1.8         Significant Diagnostics:  Noted previous notes.

## 2017-03-18 NOTE — PROGRESS NOTES
"[facility] Hospital Medicine  Progress Note    Patient Name: Crow Green  MRN: [id number]  Patient Class: IP- Inpatient   Admission Date: 3/15/2017  Length of Stay: 1 days  Attending Physician: Car Pineda MD  Primary Care Provider: Mateo Lambert DO        Subjective:     Principal Problem:Calculus of gallbladder without cholecystitis without obstruction    HPI:  The pt is a 60 yo AAM with NICM Ef 40%, nonobstructive CAD ([facility] 11/2016), DM, HTN, Asthma who presented to ED with chest pain and right shoulder pain x 4 days. Pt reports 4 days ago, he had onset of right shoulder pain at rest -worse with arm movement and better with rest. Later that night, he had substernal chest pain while in bed rated 7/10 described as sharp. He took NTG and went to bed. He then woke up and felt chest pain -on /off since then.The pain is worsened with deep breaths, when he attempts to sit up from a laying position. The pt also reports nausea and diarrhea. He had multiple  "watery" stools for the last 4 days that resolved today. Denies SOB, edema, weight gain. He does endorse dizziness- however, he reports dizziness on/off for several months.     In the ED, EKG showed NSR with incomplete LBB. Troponin 0.119>0.108 (last troponin 11/16/16 was 0.205) CTA chest showed no PE, patchy ground glass opacities could be related to gravitational change/dependent atelectasis, infectious/inflammatory etiology or less likely pulmonary edema.       Hospital Course:  With concerns for ACS and chest pain, Cardiology saw the patient and felt pain was non-cardiac in nature and advised General Surgery evaluation for possible symptomatic cholelithiasis. There was a + Heard's sign. CTA of chest suggested cholelithiasis. RUQ ultrasound found cholelithiasis, nondistended gallbladder, no pericholecystic fluid or wall thickening. There were multiple shadowing gallstones. CT of ABD/Pelvis with contrast indicated cholelithiasis.  A " cholecystectomy was scheduled for patient. Cardiology stated that pt had moderate risk for cardiac event during surgery. Subsequently, during Telemetry, intermittent runs of PAF were noted. Toprol XL was increased to 100 mg daily. Transthoracic echocardiogram noted EF of 45 % and + diastolic dysfunction.  Cardiology ordered amiodarone and recommended anticoagulation post surgery.     -03/18/17-pt seen and examined today and noted to be anxious and in distress after swallowing broth from a bowel of chicken soup.  Episode witnessed by RT at bedside post Duoneb administration. Pt reported difficulty swallowing, chest pain, abdominal pain at surgical site, and SOB.  Wheezing with coarse breath sounds noted upon assessment.  100% NRB placed and EKG obtained. Chest xray obtained.  Pt transferred to ICU and Pulmonology consulted for further evaluation.  IV antibiotics initiated.  Will continue to follow repeat lab results.        Interval History: Pt seen and examined post witnessed episode.  Pt reported CP, SOB, and trouble swallowing.  100% NRB in place.  Chest xray consistent with possible aspiration.  Pt transferred to ICU for further evaluation.      Review of Systems   Constitutional: Positive for fatigue. Negative for appetite change, chills, diaphoresis and fever.   HENT: Positive for trouble swallowing. Negative for congestion, nosebleeds and sore throat.    Eyes: Negative for pain, discharge and visual disturbance.   Respiratory: Positive for stridor. Negative for apnea, cough, chest tightness, shortness of breath and wheezing.    Cardiovascular: Positive for chest pain. Negative for palpitations and leg swelling.   Gastrointestinal: Positive for abdominal pain. Negative for abdominal distention, blood in stool, constipation, diarrhea, nausea and vomiting.   Endocrine: Negative for cold intolerance and heat intolerance.   Genitourinary: Negative for difficulty urinating, dysuria, flank pain, frequency and  urgency.   Musculoskeletal: Positive for arthralgias. Negative for back pain, joint swelling, myalgias, neck pain and neck stiffness.        Right shoulder pain    Skin: Negative for rash and wound.   Allergic/Immunologic: Negative for food allergies and immunocompromised state.   Neurological: Negative for dizziness, seizures, syncope, facial asymmetry, weakness, light-headedness and headaches.   Hematological: Negative for adenopathy.   Psychiatric/Behavioral: Negative for agitation, behavioral problems and confusion. The patient is not nervous/anxious.      Objective:     Vital Signs (Most Recent):  Temp: 99 °F (37.2 °C) (03/18/17 0752)  Pulse: 90 (03/18/17 1337)  Resp: (!) 24 (03/18/17 1337)  BP: 126/70 (03/18/17 1337)  SpO2: 96 % (03/18/17 1337) Vital Signs (24h Range):  Temp:  [97.2 °F (36.2 °C)-99 °F (37.2 °C)] 99 °F (37.2 °C)  Pulse:  [] 90  Resp:  [16-24] 24  SpO2:  [89 %-100 %] 96 %  BP: (113-166)/(66-96) 126/70     Weight: 95 kg (209 lb 7 oz)  Body mass index is 34.85 kg/(m^2).    Intake/Output Summary (Last 24 hours) at 03/18/17 1421  Last data filed at 03/18/17 0700   Gross per 24 hour   Intake             3230 ml   Output              510 ml   Net             2720 ml      Physical Exam   Constitutional: He is oriented to person, place, and time. He appears well-developed and well-nourished. + distress  HENT:   Head: Normocephalic.   Nose: Nose normal.   Mouth/Throat: Oropharynx is clear and moist.   Eyes: Conjunctivae and EOM are normal.   Neck: Normal range of motion. Neck supple. No JVD present.   Cardiovascular: Normal rate, regular rhythm, normal heart sounds and intact distal pulses.  Exam reveals no friction rub.    No murmur heard.  Pulmonary/Chest: Tachypnea noted. He has wheezes in the right upper field and the left upper field. He has rhonchi in the right lower field and the left lower field.   Abdominal: Soft. Bowel sounds are decreased. There is generalized tenderness.    Musculoskeletal: Normal range of motion.   Neurological: He is alert and oriented to person, place, and time.   Skin: Skin is warm and dry.   Abdominal surgical site intact   Psychiatric: He has a normal mood and affect. His behavior is normal. Thought content normal.       Significant Labs:   CBC:   Recent Labs  Lab 03/18/17  0515   WBC 13.36*   HGB 12.5*   HCT 37.7*        CMP:   Recent Labs  Lab 03/18/17  0515      K 4.5      CO2 24   *   BUN 9   CREATININE 0.9   CALCIUM 8.3*   ANIONGAP 8   EGFRNONAA >60     Troponin:   Recent Labs  Lab 03/18/17  1352   TROPONINI 0.085*       Significant Imaging:   Imaging Results         X-Ray Chest 1 View (In process)         CT Abdomen Pelvis With Contrast (Final result) Result time:  03/16/17 20:27:10    Final result by Tiki Velazquez MD (03/16/17 20:27:10)    Impression:      No acute findings.    Incidental findings include cholelithiasis, right renal nephrolithiasis, coronary arterial calcifications.    All CT scans at this facility use dose modulation, iterative reconstruction, and/or weight based dosing when appropriate to reduce radiation dose to as low as reasonably achievable.      Electronically signed by: TIKI VELAZQUZE MD  Date:     03/16/17  Time:    20:27     Narrative:    EXAM: CT ABDOMEN PELVIS WITH CONTRAST    CLINICAL HISTORY: Generalized abdominal pain     TECHNIQUE: Axial CT scan through the abdomen and pelvis with IV and with oral contrast.    COMPARISON STUDIES: none    FINDINGS:    Abdomen CT: 2 mm calculus right kidney.  Negative for hydronephrosis.  Small 1 cm cyst anterior left kidney.  Normal adrenals, spleen, pancreas.  Liver unremarkable.  There are some calcified gallstones noted without surrounding inflammatory changes.  No biliary ductal dilatation.  The bowel is nonobstructed.  Normal appendix.  No inflammatory changes or fluid collections.  No significant atherosclerosis in the aorta.  Probably injection sites  anterior bowel wall.    Pelvis CT: Prostate normal size.  Bladder collapsed.  Mild atherosclerosis iliac vasculature.  Moderate degenerative disc disease L5-S1.            US Abdomen Limited (Final result) Result time:  03/16/17 14:40:06    Final result by Jayant Rojas MD (03/16/17 14:40:06)    Impression:       Fatty liver. Cholelithiasis.  No sonographic evidence of acute cholecystitis.      Electronically signed by: JAYANT ROJAS MD  Date:     03/16/17  Time:    14:40     Narrative:    Exam: US ABDOMEN LIMITED    Clinical History: Acute right upper quadrant pain.  Initial encounter.      Findings:     The liver is a upper limits of normal in size and diffusely fatty. No focal liver lesion identified. Gallbladder is nondistended.  No pericholecystic fluid or wall thickening.  Multiple echogenic shadowing gallstones. The common duct measures  4  mm. The right kidney measures 10.8cm in long axis and has a normal sonographic appearance. Suboptimal visualization of the pancreas and aorta secondary to bowel gas. No free fluid in the upper abdomen. Hepatopedal flow noted in the portal vein.            CTA Chest Non-Coronary (Final result) Result time:  03/15/17 14:00:03    Final result by Pablo Sims III, MD (03/15/17 14:00:03)    Impression:      1. No evidence of pulmonary embolism.  2. Patchy groundglass opacities in the bilateral posterior lungs could be related to gravitational change/dependent atelectasis, infectious/inflammatory etiology or less likely pulmonary edema.  No other acute abnormality identified.  3.  Coronary artery calcifications.  4.  Cholelithiasis without evidence of acute cholecystitis.            Electronically signed by: PABLO SIMS MD  Date:     03/15/17  Time:    14:00     Narrative:    CTA CHEST NON CORONARY    Clinical history: R07.9 Chest pain, unspecified.  sharp stabbing chest pain radiating into the right arm. Possible pulmonary embolism    Technique: Routine CT angiogram of the  chest protocol performed after the IV administration of 100 mL Omnipaque 350. 3D reconstructions/reformats/MIPs obtained. All CT scans at this facility use dose modulation, iterative reconstruction, and/or weight based dosing when appropriate to reduce radiation dose to as low as reasonably achievable.    Comparison: None    Findings:  There is no evidence of pulmonary embolism.  No aortic aneurysm or dissection is identified.  There is no cardiomegaly or pericardial effusion.  Coronary artery calcifications are noted. No pathologically enlarged lymph nodes are seen in the chest.  There are symmetric patchy groundglass opacities scattered in the posterior aspects of both lungs, most prominent in the lower lobes.  There is chronic elevation of the right diaphragm with mild adjacent discoid atelectasis.  The remainder of the lungs appear clear of active disease.  No effusion or pneumothorax identified.  The airways and bronchi appear patent.  No acute osseous abnormality is seen. Limited images through the upper abdomen demonstrate gallstones without evidence of acute cholecystitis.            X-Ray Chest AP Portable (Final result) Result time:  03/15/17 12:06:23    Final result by Vimal Vance MD (03/15/17 12:06:23)    Impression:     See above      Electronically signed by: VIMAL VANCE MD  Date:     03/15/17  Time:    12:06     Narrative:    History: Chest pain    Shallow lung volumes with compression of markings at the bases and minimal right basilar plate atelectasis.. No definite infiltrates. Unchanged borderline heart size.            Assessment/Plan:      * Calculus of gallbladder without cholecystitis without obstruction  RUQ pain has improved - General Surgery to perform Cholecystectomy 3/17/2017    Multiple gallstones according to US  No signs of cholecystitis    CT of ABD - multiple shadowing stones      Hypertension  Norvasc, Toprol increased to 100 mg and lisinopril increased to 10 mg      Type  2 diabetes mellitus with hyperglycemia  -uncontrolled  -Accuchecks and NISS continued      Chronic combined systolic and diastolic congestive heart failure  Compensated  IVF held  Cont ACE  IV Lasix initiated      Obstructive sleep apnea syndrome  Cont CPAP    Asthma  Duonebs prn    Chest pain   ASA, BB Toprol dose increased, ACE lisinopril dose increased, Statin,   Cardiology stated pt with [facility] in 11/2016  1. Non-obstructive CAD.   2.   Non-ischemic Cardiomyopathy    Cardiology advised noncardiac pain      PAF (paroxysmal atrial fibrillation)  Telemetry monitoring  Toprol XL and amiodarone  Will need anticoagulation after surgery    Acute Respiratory Distress  -suspected tracheal swelling post procedure vs. possible aspiration  -pt transferred to ICU  -NPO  -supplemental oxygen via NRB  -IV antibiotics initiated (Unasyn)  -chest xray obtained  -Duonebs prn  -incentive spirometry     VTE Risk Mitigation         Ordered     enoxaparin injection 40 mg  Daily     Route:  Subcutaneous        03/15/17 4472     Medium Risk of VTE  Once      03/15/17 2132     Reason for No Pharmacological VTE Prophylaxis  Once      03/15/17 2132          Annabelle Choudhary NP  Department of Hospital Medicine   [facility]

## 2017-03-18 NOTE — PLAN OF CARE
Problem: Patient Care Overview  Goal: Plan of Care Review  Outcome: Ongoing (interventions implemented as appropriate)  Patient remains free from injury or falls during shift; plan of care reviewed with patient; pt verbalized understanding; pain controlled with norco 5/325 mg PRN and morphine for breakthrough x1 dose; patient ambulated in hallway without difficulty;Will continue to monitor with hourly rounding.

## 2017-03-18 NOTE — SUBJECTIVE & OBJECTIVE
Interval History: Pt seen and examined post witnessed episode.  Pt reported CP, SOB, and trouble swallowing.  100% NRB in place.  Chest xray consistent with possible aspiration.  Pt transferred to ICU for further evaluation.      Review of Systems   Constitutional: Positive for fatigue. Negative for appetite change, chills, diaphoresis and fever.   HENT: Positive for trouble swallowing. Negative for congestion, nosebleeds and sore throat.    Eyes: Negative for pain, discharge and visual disturbance.   Respiratory: Positive for stridor. Negative for apnea, cough, chest tightness, shortness of breath and wheezing.    Cardiovascular: Positive for chest pain. Negative for palpitations and leg swelling.   Gastrointestinal: Positive for abdominal pain. Negative for abdominal distention, blood in stool, constipation, diarrhea, nausea and vomiting.   Endocrine: Negative for cold intolerance and heat intolerance.   Genitourinary: Negative for difficulty urinating, dysuria, flank pain, frequency and urgency.   Musculoskeletal: Positive for arthralgias. Negative for back pain, joint swelling, myalgias, neck pain and neck stiffness.        Right shoulder pain    Skin: Negative for rash and wound.   Allergic/Immunologic: Negative for food allergies and immunocompromised state.   Neurological: Negative for dizziness, seizures, syncope, facial asymmetry, weakness, light-headedness and headaches.   Hematological: Negative for adenopathy.   Psychiatric/Behavioral: Negative for agitation, behavioral problems and confusion. The patient is not nervous/anxious.      Objective:     Vital Signs (Most Recent):  Temp: 99 °F (37.2 °C) (03/18/17 0752)  Pulse: 90 (03/18/17 1337)  Resp: (!) 24 (03/18/17 1337)  BP: 126/70 (03/18/17 1337)  SpO2: 96 % (03/18/17 1337) Vital Signs (24h Range):  Temp:  [97.2 °F (36.2 °C)-99 °F (37.2 °C)] 99 °F (37.2 °C)  Pulse:  [] 90  Resp:  [16-24] 24  SpO2:  [89 %-100 %] 96 %  BP: (113-166)/(66-96) 126/70      Weight: 95 kg (209 lb 7 oz)  Body mass index is 34.85 kg/(m^2).    Intake/Output Summary (Last 24 hours) at 03/18/17 1421  Last data filed at 03/18/17 0700   Gross per 24 hour   Intake             3230 ml   Output              510 ml   Net             2720 ml      Physical Exam   Constitutional: He is oriented to person, place, and time. He appears well-developed and well-nourished.   HENT:   Head: Normocephalic.   Nose: Nose normal.   Mouth/Throat: Oropharynx is clear and moist.   Eyes: Conjunctivae and EOM are normal.   Neck: Normal range of motion. Neck supple. No JVD present.   Cardiovascular: Normal rate, regular rhythm, normal heart sounds and intact distal pulses.  Exam reveals no friction rub.    No murmur heard.  Pulmonary/Chest: Tachypnea noted. He has wheezes in the right upper field and the left upper field. He has rhonchi in the right lower field and the left lower field.   Abdominal: Soft. Bowel sounds are decreased. There is generalized tenderness.   Musculoskeletal: Normal range of motion.   Neurological: He is alert and oriented to person, place, and time.   Skin: Skin is warm and dry.   Abdominal surgical site intact   Psychiatric: He has a normal mood and affect. His behavior is normal. Thought content normal.       Significant Labs:   CBC:   Recent Labs  Lab 03/18/17  0515   WBC 13.36*   HGB 12.5*   HCT 37.7*        CMP:   Recent Labs  Lab 03/18/17  0515      K 4.5      CO2 24   *   BUN 9   CREATININE 0.9   CALCIUM 8.3*   ANIONGAP 8   EGFRNONAA >60     Troponin:   Recent Labs  Lab 03/18/17  1352   TROPONINI 0.085*       Significant Imaging:   Imaging Results         X-Ray Chest 1 View (In process)         CT Abdomen Pelvis With Contrast (Final result) Result time:  03/16/17 20:27:10    Final result by Jose Velazquez MD (03/16/17 20:27:10)    Impression:      No acute findings.    Incidental findings include cholelithiasis, right renal nephrolithiasis, coronary  arterial calcifications.    All CT scans at this facility use dose modulation, iterative reconstruction, and/or weight based dosing when appropriate to reduce radiation dose to as low as reasonably achievable.      Electronically signed by: TIKI TRUJILLO MD  Date:     03/16/17  Time:    20:27     Narrative:    EXAM: CT ABDOMEN PELVIS WITH CONTRAST    CLINICAL HISTORY: Generalized abdominal pain     TECHNIQUE: Axial CT scan through the abdomen and pelvis with IV and with oral contrast.    COMPARISON STUDIES: none    FINDINGS:    Abdomen CT: 2 mm calculus right kidney.  Negative for hydronephrosis.  Small 1 cm cyst anterior left kidney.  Normal adrenals, spleen, pancreas.  Liver unremarkable.  There are some calcified gallstones noted without surrounding inflammatory changes.  No biliary ductal dilatation.  The bowel is nonobstructed.  Normal appendix.  No inflammatory changes or fluid collections.  No significant atherosclerosis in the aorta.  Probably injection sites anterior bowel wall.    Pelvis CT: Prostate normal size.  Bladder collapsed.  Mild atherosclerosis iliac vasculature.  Moderate degenerative disc disease L5-S1.            US Abdomen Limited (Final result) Result time:  03/16/17 14:40:06    Final result by Jayant Pickard MD (03/16/17 14:40:06)    Impression:       Fatty liver. Cholelithiasis.  No sonographic evidence of acute cholecystitis.      Electronically signed by: JAYANT PICKARD MD  Date:     03/16/17  Time:    14:40     Narrative:    Exam: US ABDOMEN LIMITED    Clinical History: Acute right upper quadrant pain.  Initial encounter.      Findings:     The liver is a upper limits of normal in size and diffusely fatty. No focal liver lesion identified. Gallbladder is nondistended.  No pericholecystic fluid or wall thickening.  Multiple echogenic shadowing gallstones. The common duct measures  4  mm. The right kidney measures 10.8cm in long axis and has a normal sonographic appearance. Suboptimal  visualization of the pancreas and aorta secondary to bowel gas. No free fluid in the upper abdomen. Hepatopedal flow noted in the portal vein.            CTA Chest Non-Coronary (Final result) Result time:  03/15/17 14:00:03    Final result by Pablo Sims III, MD (03/15/17 14:00:03)    Impression:      1. No evidence of pulmonary embolism.  2. Patchy groundglass opacities in the bilateral posterior lungs could be related to gravitational change/dependent atelectasis, infectious/inflammatory etiology or less likely pulmonary edema.  No other acute abnormality identified.  3.  Coronary artery calcifications.  4.  Cholelithiasis without evidence of acute cholecystitis.            Electronically signed by: PABLO SIMS MD  Date:     03/15/17  Time:    14:00     Narrative:    CTA CHEST NON CORONARY    Clinical history: R07.9 Chest pain, unspecified.  sharp stabbing chest pain radiating into the right arm. Possible pulmonary embolism    Technique: Routine CT angiogram of the chest protocol performed after the IV administration of 100 mL Omnipaque 350. 3D reconstructions/reformats/MIPs obtained. All CT scans at this facility use dose modulation, iterative reconstruction, and/or weight based dosing when appropriate to reduce radiation dose to as low as reasonably achievable.    Comparison: None    Findings:  There is no evidence of pulmonary embolism.  No aortic aneurysm or dissection is identified.  There is no cardiomegaly or pericardial effusion.  Coronary artery calcifications are noted. No pathologically enlarged lymph nodes are seen in the chest.  There are symmetric patchy groundglass opacities scattered in the posterior aspects of both lungs, most prominent in the lower lobes.  There is chronic elevation of the right diaphragm with mild adjacent discoid atelectasis.  The remainder of the lungs appear clear of active disease.  No effusion or pneumothorax identified.  The airways and bronchi appear patent.  No  acute osseous abnormality is seen. Limited images through the upper abdomen demonstrate gallstones without evidence of acute cholecystitis.            X-Ray Chest AP Portable (Final result) Result time:  03/15/17 12:06:23    Final result by Vimal Vance MD (03/15/17 12:06:23)    Impression:     See above      Electronically signed by: VIMAL VANCE MD  Date:     03/15/17  Time:    12:06     Narrative:    History: Chest pain    Shallow lung volumes with compression of markings at the bases and minimal right basilar plate atelectasis.. No definite infiltrates. Unchanged borderline heart size.

## 2017-03-18 NOTE — PROGRESS NOTES
Dr. Charles informed of 8 beat run of V-tach; patient denies CP, now NS on monitor, HR 98;new orders received; will continue to monitor

## 2017-03-18 NOTE — PROGRESS NOTES
ISS teaching done at the bedside. Pt. Was able to get up to 500ml/Hmmg pt. Used it a 10 times. Will continue to monitor. Will ambulate pt. Once pain is controlled.

## 2017-03-18 NOTE — PROGRESS NOTES
Pulmonary / Critical Care Progress Note    Called to patient bedside for RUQ pain , cough and chest congestion  Will probably need additional morphine for pain control    CT denise of chest does not clearly show an area of well defined pneumonia but there is significant right lower lobe, right middle lobe atelectesis    Patient evaluated at the bedside. Chart reviewed and patient examined. Progress  discussed with critical care nurse and nursing update given.  While at the patient's bedside hemodynamic effects of medications monitored. Dosage of medications adjusted.   Family updated.    Critical care time was exclusive of separately billable procedures and treating other patients.     Critical care was time spent personally by me on the following activities: development of treatment plan with patient or surrogate, discussions with consultants, evaluation of patient's response to treatment, examination of patient, obtaining history from patient or surrogate, ordering and performing treatments and interventions, ordering and review of laboratory studies, ordering and personal review of radiographic studies, pulse oximetry, Cardiac rhythm strips reviewed, re-evaluation of patient's condition, review of old charts    Critical Care time spent at bedside: 30 min    Juancho Alicea MD  Pulmonary / Critical Care Medicine

## 2017-03-18 NOTE — PROGRESS NOTES
Leighann RRT indicated that pt. Couldn't swallow he attempted to eat some soup, pt. Began to c/o of midsternal chest pain. 1 nitro was given, chest pain still unrelieved, RRT is at the bedside, Funmi FNP at the bedside orders were given, Pt. Still c/o of unrelieved chest pain morphine was given, pt. Indicated he couldn't breath, Non-rebreather applied, pt. O2 Saturation was 96%. Dr. Conklin at the bedside, cardiac work up done. Orders were given to transfer pt.

## 2017-03-18 NOTE — PROGRESS NOTES
"[facility] -   General Surgery  Progress Note    Subjective:     History of Present Illness:  60 y/o male referred for + kendrick sign/ abdominal pain. The patient was admitted for chest pain and right shoulder pain. He has some associated nausea and diarrhea. Denies any fever/chills, abdominal pain prior to this or with eating. He was noted to have mildly elevated troponin which was cardiology felt due to noncardiac etiology. CT chest showed cholelithiasis. The patient was noted to have significant RUQ ttp so U/S done also showing cholelithiasis without any inflammatory changes. The patient reports previous removal of "fist size"gallstones in the 70's and has right subcostal incision.    Post-Op Info:  Procedure(s) (LRB):  CHOLECYSTECTOMY-LAPAROSCOPIC (N/A)   1 Day Post-Op     Interval History: status post lap cholecystectomy    Medications:  Continuous Infusions:   dextrose 5 % and 0.45 % NaCl with KCl 20 mEq 125 mL/hr at 03/18/17 1213     Scheduled Meds:   amlodipine  10 mg Oral Daily    atorvastatin  40 mg Oral QHS    enoxaparin  40 mg Subcutaneous Daily    gabapentin  300 mg Oral TID    ipratropium  0.5 mg Nebulization Q6H    lisinopril  10 mg Oral Daily    metoprolol succinate  100 mg Oral Daily    pantoprazole  40 mg Oral Daily    sertraline  50 mg Oral Daily     PRN Meds:ceFAZolin (ANCEF) IVPB, dextrose 50%, dextrose 50%, glucagon (human recombinant), glucose, glucose, hydrocodone-acetaminophen 5-325mg, insulin aspart, morphine, nitroGLYCERIN, ondansetron, promethazine (PHENERGAN) IVPB, tramadol     Review of patient's allergies indicates:  No Known Allergies  Objective:     Vital Signs (Most Recent):  Temp: 99 °F (37.2 °C) (03/18/17 0752)  Pulse: 96 (03/18/17 1139)  Resp: 18 (03/18/17 1139)  BP: 132/86 (03/18/17 1139)  SpO2: (!) 94 % (03/18/17 1139) Vital Signs (24h Range):  Temp:  [97.2 °F (36.2 °C)-99 °F (37.2 °C)] 99 °F (37.2 °C)  Pulse:  [] 96  Resp:  [16-20] 18  SpO2:  [93 " %-100 %] 94 %  BP: (123-166)/(66-96) 132/86     Weight: 95 kg (209 lb 7 oz)  Body mass index is 34.85 kg/(m^2).    Intake/Output - Last 3 Shifts       03/16 0700 - 03/17 0659 03/17 0700 - 03/18 0659 03/18 0700 - 03/19 0659    P.O. 300 180     I.V. (mL/kg) 388.8 (4.1) 3000 (31.6)     IV Piggyback  50     Total Intake(mL/kg) 688.8 (7.3) 3230 (34)     Urine (mL/kg/hr) 1125 (0.5)  500 (1)    Blood  10 (0)     Total Output 1125 10 500    Net -436.3 +3220 -500           Urine Occurrence  1 x     Stool Occurrence 0 x            Physical Exam   Constitutional: He is oriented to person, place, and time. He appears well-developed and well-nourished.   HENT:   Head: Normocephalic.   Right Ear: External ear normal.   Left Ear: External ear normal.   Nose: Nose normal.   Eyes: Pupils are equal, round, and reactive to light. No scleral icterus.   Neck: Normal range of motion. Neck supple. No thyromegaly present.   Cardiovascular: Normal rate, regular rhythm and normal heart sounds.    No murmur heard.  Pulmonary/Chest: Effort normal and breath sounds normal.   Abdominal: Soft. Bowel sounds are normal. He exhibits distension. There is tenderness. There is no guarding.   Musculoskeletal: Normal range of motion.   Lymphadenopathy:     He has no cervical adenopathy.   Neurological: He is alert and oriented to person, place, and time.   Skin: Skin is warm and dry.       Significant Labs:  CBC:   Recent Labs  Lab 03/18/17  0515   WBC 13.36*   RBC 4.34*   HGB 12.5*   HCT 37.7*      MCV 87   MCH 28.8   MCHC 33.2     BMP:   Recent Labs  Lab 03/18/17  0515   *      K 4.5      CO2 24   BUN 9   CREATININE 0.9   CALCIUM 8.3*   MG 1.8         Significant Diagnostics:  Noted previous notes.    Assessment/Plan:     No new Assessment & Plan notes have been filed under this hospital service since the last note was generated.  Service: General Surgery      Lito Brewer MD  General Surgery  [facility]

## 2017-03-18 NOTE — PLAN OF CARE
Problem: Patient Care Overview  Goal: Plan of Care Review  Outcome: Ongoing (interventions implemented as appropriate)  Patient resting on O2 and tolerating neb txs well. Will continue to monitor. RT to follow.

## 2017-03-18 NOTE — ANESTHESIA POSTPROCEDURE EVALUATION
"Anesthesia Post Evaluation    Patient: Crow Green    Procedure(s) Performed: Procedure(s) (LRB):  CHOLECYSTECTOMY-LAPAROSCOPIC (N/A)    Final Anesthesia Type: general  Patient location during evaluation: PACU  Patient participation: Yes- Able to Participate  Level of consciousness: awake and alert  Post-procedure vital signs: reviewed and stable  Pain management: adequate  Airway patency: patent  PONV status at discharge: No PONV  Anesthetic complications: no      Cardiovascular status: blood pressure returned to baseline  Respiratory status: unassisted  Hydration status: euvolemic  Follow-up not needed.        Visit Vitals    BP (!) 147/79 (BP Location: Left arm, Patient Position: Lying, BP Method: Automatic)    Pulse 84    Temp 36.5 °C (97.7 °F) (Oral)    Resp 20    Ht 5' 5" (1.651 m)    Wt 95 kg (209 lb 7 oz)    SpO2 (!) 93%    BMI 34.85 kg/m2       Pain/Raymond Score: Pain Assessment Performed: Yes (3/17/2017  5:00 PM)  Presence of Pain: denies (3/17/2017  5:22 PM)  Pain Rating Prior to Med Admin: 10 (3/17/2017  7:21 PM)  Pain Rating Post Med Admin: 0 (3/17/2017 10:42 AM)  Raymond Score: 8 (3/17/2017  5:00 PM)      "

## 2017-03-18 NOTE — PROGRESS NOTES
1319 Patient on 2lnc sats 90%.  RT went in to give neb tx to patient, he stated that he did not feel well.  As RT was preparing neb tx patient took a sip of his soup and at that time he aspirated, perfuse coughing and grabbing his chest due to severe chest pain.  Neb  tx given.  Post tx patient sats decreased to 89%.  Increased to 4lpm still complaining of chest discomfort.   MAYNOR Kerr notified, NP Annabelle  called to bedside along with MD.  EKG, labs and vitals done at this time.  Patient stated that he could not breath, NRB placed at this time.  Sats at 95%  HR 92.  X-ray and lab called stat to room.  MD plans to send to ICU.

## 2017-03-18 NOTE — CONSULTS
"Inpatient consult to Pulmonology  Consult performed by: TRINA ADHIKARI  Consult ordered by: KELSI FALK  Reason for consult: aspiration pneumonia and respiratory distress        Critical Care Consult      Chief Complaint:  Respiratory Distress    HPI:    Crow Green is a 61 y.o. male with a PMH of HTN, DM, asthma, depression, and arthritis who presented to [facility] on 3/15/17 with chest pain and admitted; work up suspicious for non cardiac pain revealed cholelithiasis and on 3/17 underwent laparoscopic cholecystectomy; today while drinking broth (with RT at bedside preparing for scheduled neb treatment) he had aggressive coughing, complaint of difficulty swallowing, chest pain, surgical pain, increased work of breathing, and desaturation into upper 80s  Chest x ray and EKG were completed; he was placed on 100% NRB and is transferred to ICU for further evaluation and management.  On arrival to ICU, Mr Green is awake and alert, with mild work of breathing on 100% NRB with sats mid 90s and audible upper airway stridor, poor overall air movement, and RLL crackles on auscultation; he reports baseline SOB with minimal exertion and recently started using a "pap" at night (sees Dr Vargas) but does not were supplemental oxygen at baseline; he reports 17 yrs since diagnosis with asthma and uses his rescue albuterol frequently; he is a never smoker and is up to date on immunizations; he denies swallowing difficulty before today      PMHx:      Past Medical History:   Diagnosis Date    Arthritis     Asthma     Depression     Diabetes mellitus     Diabetes mellitus, type 2 2000    Elevated PSA     Hypertension         PMSx:      History reviewed. No pertinent surgical history.     Social Hx:      Social History     Social History    Marital status: Legally      Spouse name: N/A    Number of children: 5    Years of education: N/A     Occupational History    Not on file.     Social History Main " Topics    Smoking status: Never Smoker    Smokeless tobacco: Not on file    Alcohol use No    Drug use: No    Sexual activity: Not Currently     Other Topics Concern    Not on file     Social History Narrative        Family Hx:      Family History   Problem Relation Age of Onset    Glaucoma Mother     Cataracts Mother     Cataracts Father     Glaucoma Sister     Cataracts Sister     Glaucoma Maternal Aunt     Prostate cancer Neg Hx         Allergies:      Review of patient's allergies indicates:  No Known Allergies    Medications:    Home medications prior to admission reviewed  Current Facility-Administered Medications   Medication    albuterol-ipratropium 2.5mg-0.5mg/3mL nebulizer solution 3 mL    amlodipine tablet 10 mg    arformoterol nebulizer solution 15 mcg    atorvastatin tablet 40 mg    budesonide nebulizer solution 0.5 mg    cefepime in dextrose 5 % 1 gram/50 mL IVPB 1 g    dextrose 50% injection 12.5 g    dextrose 50% injection 25 g    enoxaparin injection 40 mg    furosemide injection 40 mg    gabapentin capsule 300 mg    glucagon (human recombinant) injection 1 mg    glucose chewable tablet 16 g    glucose chewable tablet 24 g    hydrocodone-acetaminophen 5-325mg per tablet 1 tablet    insulin aspart pen 1-10 Units    insulin detemir pen 12 Units    lisinopril tablet 10 mg    metoprolol succinate (TOPROL-XL) 24 hr tablet 100 mg    metronidazole IVPB 500 mg    morphine injection 2 mg    nitroGLYCERIN SL tablet 0.4 mg    ondansetron injection 4 mg    pantoprazole EC tablet 40 mg    promethazine (PHENERGAN) 12.5 mg in dextrose 5 % 50 mL IVPB    sertraline tablet 50 mg    tramadol tablet 50 mg       ROS:  Review of Systems   Constitutional: Negative for chills and fever.   Respiratory: Positive for cough, sputum production, shortness of breath and stridor.    Cardiovascular: Negative for chest pain and palpitations.   Gastrointestinal: Positive for abdominal pain  (incisional; worse with cough or movement). Negative for nausea and vomiting.   Genitourinary: Negative.    Musculoskeletal: Negative.    Neurological: Negative for focal weakness and seizures.   Psychiatric/Behavioral: The patient is not nervous/anxious.        Vital Signs (Most Recent)  Temp: 99 °F (37.2 °C) (03/18/17 0752)  Pulse: 97 (03/18/17 1435)  Resp: (!) 27 (03/18/17 1435)  BP: 126/70 (03/18/17 1337)  SpO2: 96 % (03/18/17 1435)    Vital Signs Range (Last 24H):  Temp:  [97.2 °F (36.2 °C)-99 °F (37.2 °C)]   Pulse:  []   Resp:  [16-27]   BP: (113-166)/(66-96)   SpO2:  [89 %-100 %]     I & O (Last 24H):  Intake/Output Summary (Last 24 hours) at 03/18/17 1459  Last data filed at 03/18/17 0700   Gross per 24 hour   Intake             2230 ml   Output              510 ml   Net             1720 ml     Ventilator Data (Last 24H):     Oxygen Concentration (%):  [] 98       Physical Exam   Constitutional: He is oriented to person, place, and time.   HENT:   Head: Normocephalic.   Neck: No JVD present. No tracheal deviation present.   Cardiovascular: Normal rate and regular rhythm.   No extrasystoles are present.   No murmur heard.  Pulses:       Radial pulses are 2+ on the right side, and 2+ on the left side.        Dorsalis pedis pulses are 2+ on the right side, and 2+ on the left side.   Pulmonary/Chest: No accessory muscle usage. He is in respiratory distress (mild). He has rhonchi. He has rales in the right lower field.   Audible upper airway stridor   Abdominal: Soft. Bowel sounds are decreased. There is tenderness.   Lap sites x 5 open to air with no erythema or drainage  Diffuse tenderness greatest at RUQ consistent with POD1 lap aidan   Musculoskeletal: He exhibits no edema.   Neurological: He is alert and oriented to person, place, and time.   Skin: Skin is warm and dry.   Psychiatric: He has a normal mood and affect. Thought content normal.       Laboratory (Last 24H):  CBC:    Recent Labs  Lab  03/18/17  0515   WBC 13.36*   HGB 12.5*   HCT 37.7*        CMP:    Recent Labs  Lab 03/18/17  0515   CALCIUM 8.3*      K 4.5   CO2 24      BUN 9   CREATININE 0.9       Labs within the past 24 hours have been reviewed.    Chest X-Ray:   Film and report reviewed: per me: RLL infiltrate along with vascular congestion and evidence of mild pulmonary edema along with enlarged cardiac silhouette compared to October of 2016      ASSESSMENT/PLAN:     Problem   Acute Respiratory Failure With Hypoxia   Flash Pulmonary Edema   Aspiration into Airway   Asthma   Controlled Type 2 Diabetes Mellitus With Hyperglycemia, With Long-Term Current Use of Insulin   Hypertension   Obstructive Sleep Apnea Syndrome       1. Neuro: monitor  2. Pulmonary: supplemental oxygen to keep sat > 92 with nocturnal and prn BiPap use for SOB/increased work of breathing; continue ipratropium, add TEJINDER and resume home budesonide and arformoterol; cover with short course empiric aspiration pneumonia abx; encourage aggressive TCDB, IS, and mobilization  3. Cardiac: monitor hemodynamics; continue metoprolol, lisinopril, lasix, and statin  4. Renal: monitor creatinine; accurate I/O  5. Infectious Disease: zosyn on critical reserve, will use cefepime and flagyl for empiric gm negative and anaerobic coverage  6. Hematology/Oncology: monitor for bleeding  7. Endocrine: add long acting insulin plus SSI prn with monitoring for glucose control and prevention of insulin toxicity (home high dose lantus and metformin, hold metformin while critically ill)  8. Fluids/Electrolytes/Nutrition/GI:   9. Musculoskeletal: OOB with assist; encourage mobilization  10. Pain Management:  Analgesia available  11. Discharge and Palliative Care: anticipate home with family on discharge    Preventive Measures:   Nutrition: Goal: Tolerate oral diet and meet caloric needs  Stress Ulcer: PPI  DVT: LMWH/SCDs  Head of Bed/Reposition: Elevate HOB and turn Q1-2 hours     Physical Therapy: consult once stable  IVAB Day: 1  Pneumonia Vaccine: up to date  Flu Vaccine: up to date  Surrogate Decision Maker: daughter, Anita  Code Status: full    Counseling/Consultation:I have discussed the care of this patient in detail with the nursing staff and Dr. Alicea and Dr Pineda.    Critical Care Time 58 minutes  Critical secondary to acute respiratory failure with hypoxia  Critical care was time spent personally by me on the following activities: development of treatment plan with patient or surrogate and bedside caregivers, discussions with consultants, evaluation of patient's response to treatment, examination of patient, ordering and performing treatments and interventions, ordering and review of laboratory studies, ordering and review of radiographic studies, pulse oximetry, and re-evaluation of patient's condition.  This critical care time did not overlap with that of any other provider.    Thank you AILEEN Conte for this consult and the pleasure of evaluating and caring for this patient    Shawna Martinez [facility]  [facility]

## 2017-03-19 PROBLEM — R06.03 ACUTE RESPIRATORY DISTRESS: Status: ACTIVE | Noted: 2017-03-19

## 2017-03-19 LAB
ALBUMIN SERPL BCP-MCNC: 3 G/DL
ALP SERPL-CCNC: 72 U/L
ALT SERPL W/O P-5'-P-CCNC: 54 U/L
ANION GAP SERPL CALC-SCNC: 9 MMOL/L
AST SERPL-CCNC: 38 U/L
BASOPHILS # BLD AUTO: 0.01 K/UL
BASOPHILS NFR BLD: 0.1 %
BILIRUB SERPL-MCNC: 1.1 MG/DL
BUN SERPL-MCNC: 13 MG/DL
CALCIUM SERPL-MCNC: 8.6 MG/DL
CHLORIDE SERPL-SCNC: 103 MMOL/L
CO2 SERPL-SCNC: 25 MMOL/L
CREAT SERPL-MCNC: 1 MG/DL
DIFFERENTIAL METHOD: ABNORMAL
EOSINOPHIL # BLD AUTO: 0.1 K/UL
EOSINOPHIL NFR BLD: 0.4 %
ERYTHROCYTE [DISTWIDTH] IN BLOOD BY AUTOMATED COUNT: 13.9 %
EST. GFR  (AFRICAN AMERICAN): >60 ML/MIN/1.73 M^2
EST. GFR  (NON AFRICAN AMERICAN): >60 ML/MIN/1.73 M^2
GLUCOSE SERPL-MCNC: 148 MG/DL
HCT VFR BLD AUTO: 37.7 %
HGB BLD-MCNC: 12.6 G/DL
LYMPHOCYTES # BLD AUTO: 2.3 K/UL
LYMPHOCYTES NFR BLD: 14.3 %
MCH RBC QN AUTO: 29.4 PG
MCHC RBC AUTO-ENTMCNC: 33.4 %
MCV RBC AUTO: 88 FL
MONOCYTES # BLD AUTO: 1.7 K/UL
MONOCYTES NFR BLD: 10.5 %
NEUTROPHILS # BLD AUTO: 11.8 K/UL
NEUTROPHILS NFR BLD: 74.7 %
PLATELET # BLD AUTO: 165 K/UL
PMV BLD AUTO: 9.9 FL
POCT GLUCOSE: 209 MG/DL (ref 70–110)
POCT GLUCOSE: 212 MG/DL (ref 70–110)
POCT GLUCOSE: 235 MG/DL (ref 70–110)
POCT GLUCOSE: 269 MG/DL (ref 70–110)
POTASSIUM SERPL-SCNC: 4.4 MMOL/L
PROT SERPL-MCNC: 7.4 G/DL
RBC # BLD AUTO: 4.29 M/UL
SODIUM SERPL-SCNC: 137 MMOL/L
WBC # BLD AUTO: 15.83 K/UL

## 2017-03-19 PROCEDURE — 63600175 PHARM REV CODE 636 W HCPCS: Performed by: NURSE PRACTITIONER

## 2017-03-19 PROCEDURE — 99232 SBSQ HOSP IP/OBS MODERATE 35: CPT | Mod: ,,, | Performed by: INTERNAL MEDICINE

## 2017-03-19 PROCEDURE — 25000242 PHARM REV CODE 250 ALT 637 W/ HCPCS: Performed by: NURSE PRACTITIONER

## 2017-03-19 PROCEDURE — 99024 POSTOP FOLLOW-UP VISIT: CPT | Mod: ,,, | Performed by: SURGERY

## 2017-03-19 PROCEDURE — 80053 COMPREHEN METABOLIC PANEL: CPT

## 2017-03-19 PROCEDURE — 63600175 PHARM REV CODE 636 W HCPCS: Performed by: EMERGENCY MEDICINE

## 2017-03-19 PROCEDURE — 25000003 PHARM REV CODE 250: Performed by: NURSE PRACTITIONER

## 2017-03-19 PROCEDURE — 25000003 PHARM REV CODE 250: Performed by: PHYSICIAN ASSISTANT

## 2017-03-19 PROCEDURE — 92610 EVALUATE SWALLOWING FUNCTION: CPT

## 2017-03-19 PROCEDURE — 27000221 HC OXYGEN, UP TO 24 HOURS

## 2017-03-19 PROCEDURE — 25000003 PHARM REV CODE 250: Performed by: EMERGENCY MEDICINE

## 2017-03-19 PROCEDURE — 94640 AIRWAY INHALATION TREATMENT: CPT

## 2017-03-19 PROCEDURE — 25000003 PHARM REV CODE 250: Performed by: INTERNAL MEDICINE

## 2017-03-19 PROCEDURE — 36415 COLL VENOUS BLD VENIPUNCTURE: CPT

## 2017-03-19 PROCEDURE — 25000003 PHARM REV CODE 250: Performed by: SURGERY

## 2017-03-19 PROCEDURE — 85025 COMPLETE CBC W/AUTO DIFF WBC: CPT

## 2017-03-19 PROCEDURE — 20000000 HC ICU ROOM

## 2017-03-19 PROCEDURE — 94799 UNLISTED PULMONARY SVC/PX: CPT

## 2017-03-19 PROCEDURE — 99291 CRITICAL CARE FIRST HOUR: CPT | Mod: ,,, | Performed by: INTERNAL MEDICINE

## 2017-03-19 RX ORDER — FUROSEMIDE 10 MG/ML
40 INJECTION INTRAMUSCULAR; INTRAVENOUS DAILY
Status: DISCONTINUED | OUTPATIENT
Start: 2017-03-20 | End: 2017-03-20 | Stop reason: HOSPADM

## 2017-03-19 RX ORDER — AMIODARONE HYDROCHLORIDE 200 MG/1
400 TABLET ORAL DAILY
Status: DISCONTINUED | OUTPATIENT
Start: 2017-03-19 | End: 2017-03-20 | Stop reason: HOSPADM

## 2017-03-19 RX ADMIN — HYDROCODONE BITARTRATE AND ACETAMINOPHEN 1 TABLET: 5; 325 TABLET ORAL at 05:03

## 2017-03-19 RX ADMIN — HYDROCODONE BITARTRATE AND ACETAMINOPHEN 1 TABLET: 5; 325 TABLET ORAL at 07:03

## 2017-03-19 RX ADMIN — METOPROLOL SUCCINATE 100 MG: 50 TABLET, EXTENDED RELEASE ORAL at 09:03

## 2017-03-19 RX ADMIN — IPRATROPIUM BROMIDE AND ALBUTEROL SULFATE 3 ML: .5; 3 SOLUTION RESPIRATORY (INHALATION) at 07:03

## 2017-03-19 RX ADMIN — METRONIDAZOLE 500 MG: 500 INJECTION, SOLUTION INTRAVENOUS at 08:03

## 2017-03-19 RX ADMIN — LISINOPRIL 10 MG: 10 TABLET ORAL at 09:03

## 2017-03-19 RX ADMIN — BUDESONIDE 0.5 MG: 0.5 SUSPENSION RESPIRATORY (INHALATION) at 07:03

## 2017-03-19 RX ADMIN — PANTOPRAZOLE SODIUM 40 MG: 40 TABLET, DELAYED RELEASE ORAL at 09:03

## 2017-03-19 RX ADMIN — ARFORMOTEROL TARTRATE 15 MCG: 15 SOLUTION RESPIRATORY (INHALATION) at 07:03

## 2017-03-19 RX ADMIN — SERTRALINE HYDROCHLORIDE 50 MG: 50 TABLET, FILM COATED ORAL at 09:03

## 2017-03-19 RX ADMIN — ENOXAPARIN SODIUM 40 MG: 100 INJECTION SUBCUTANEOUS at 12:03

## 2017-03-19 RX ADMIN — ONDANSETRON 4 MG: 2 INJECTION INTRAMUSCULAR; INTRAVENOUS at 07:03

## 2017-03-19 RX ADMIN — INSULIN ASPART 4 UNITS: 100 INJECTION, SOLUTION INTRAVENOUS; SUBCUTANEOUS at 12:03

## 2017-03-19 RX ADMIN — GABAPENTIN 300 MG: 300 CAPSULE ORAL at 03:03

## 2017-03-19 RX ADMIN — CEFEPIME HYDROCHLORIDE 1 G: 1 INJECTION, POWDER, FOR SOLUTION INTRAMUSCULAR; INTRAVENOUS at 04:03

## 2017-03-19 RX ADMIN — IPRATROPIUM BROMIDE AND ALBUTEROL SULFATE 3 ML: .5; 3 SOLUTION RESPIRATORY (INHALATION) at 12:03

## 2017-03-19 RX ADMIN — ATORVASTATIN CALCIUM 40 MG: 40 TABLET, FILM COATED ORAL at 09:03

## 2017-03-19 RX ADMIN — METRONIDAZOLE 500 MG: 500 INJECTION, SOLUTION INTRAVENOUS at 11:03

## 2017-03-19 RX ADMIN — INSULIN DETEMIR 20 UNITS: 100 INJECTION, SOLUTION SUBCUTANEOUS at 09:03

## 2017-03-19 RX ADMIN — FUROSEMIDE 40 MG: 10 INJECTION, SOLUTION INTRAMUSCULAR; INTRAVENOUS at 09:03

## 2017-03-19 RX ADMIN — GABAPENTIN 300 MG: 300 CAPSULE ORAL at 05:03

## 2017-03-19 RX ADMIN — AMLODIPINE BESYLATE 10 MG: 10 TABLET ORAL at 09:03

## 2017-03-19 RX ADMIN — AMIODARONE HYDROCHLORIDE 400 MG: 200 TABLET ORAL at 12:03

## 2017-03-19 RX ADMIN — INSULIN ASPART 2 UNITS: 100 INJECTION, SOLUTION INTRAVENOUS; SUBCUTANEOUS at 10:03

## 2017-03-19 RX ADMIN — METRONIDAZOLE 500 MG: 500 INJECTION, SOLUTION INTRAVENOUS at 04:03

## 2017-03-19 RX ADMIN — LATANOPROST 1 DROP: 50 SOLUTION OPHTHALMIC at 10:03

## 2017-03-19 RX ADMIN — CEFEPIME HYDROCHLORIDE 1 G: 1 INJECTION, POWDER, FOR SOLUTION INTRAMUSCULAR; INTRAVENOUS at 03:03

## 2017-03-19 RX ADMIN — GABAPENTIN 300 MG: 300 CAPSULE ORAL at 09:03

## 2017-03-19 RX ADMIN — INSULIN ASPART 4 UNITS: 100 INJECTION, SOLUTION INTRAVENOUS; SUBCUTANEOUS at 05:03

## 2017-03-19 NOTE — ASSESSMENT & PLAN NOTE
ASA, BB Toprol dose increased, ACE lisinopril dose increased, Statin,   Cardiology stated pt with C in 11/2016  1. Non-obstructive CAD.   2.   Non-ischemic Cardiomyopathy    Cardiology advised noncardiac pain    Resolved after Lap Mary-- likely referred pain from Acute cholecystitis.

## 2017-03-19 NOTE — ASSESSMENT & PLAN NOTE
Sec to Laryngospasm from previous day's intubation-- resolved with racemic Epinephrine.  Continue IS, Ambulate for Atelectasis

## 2017-03-19 NOTE — PROGRESS NOTES
"[facility] Hospital Medicine  Progress Note    Patient Name: Crow Green  MRN: [id number]  Patient Class: IP- Inpatient   Admission Date: 3/15/2017  Length of Stay: 2 days  Attending Physician: Car Pineda MD  Primary Care Provider: Mateo Lambert DO        Subjective:     Principal Problem:Chest pain    HPI:  The pt is a 62 yo AAM with NICM Ef 40%, nonobstructive CAD ([facility] 11/2016), DM, HTN, Asthma who presented to ED with chest pain and right shoulder pain x 4 days. Pt reports 4 days ago, he had onset of right shoulder pain at rest -worse with arm movement and better with rest. Later that night, he had substernal chest pain while in bed rated 7/10 described as sharp. He took NTG and went to bed. He then woke up and felt chest pain -on /off since then.The pain is worsened with deep breaths, when he attempts to sit up from a laying position. The pt also reports nausea and diarrhea. He had multiple  "watery" stools for the last 4 days that resolved today. Denies SOB, edema, weight gain. He does endorse dizziness- however, he reports dizziness on/off for several months.   In the ED, EKG showed NSR with incomplete LBB. Troponin 0.119>0.108 (last troponin 11/16/16 was 0.205) CTA chest showed no PE, patchy ground glass opacities could be related to gravitational change/dependent atelectasis, infectious/inflammatory etiology or less likely pulmonary edema.       Hospital Course:  With concerns for ACS and chest pain, Cardiology saw the patient and felt pain was non-cardiac in nature and advised General Surgery evaluation for possible symptomatic cholelithiasis. There was a + Heard's sign. CTA of chest suggested cholelithiasis. RUQ ultrasound found cholelithiasis, nondistended gallbladder, no pericholecystic fluid or wall thickening. There were multiple shadowing gallstones. CT of ABD/Pelvis with contrast indicated cholelithiasis.  A cholecystectomy was scheduled for patient. Cardiology stated that " pt had moderate risk for cardiac event during surgery. Subsequently, during Telemetry, intermittent runs of PAF were noted. Toprol XL was increased to 100 mg daily. Transthoracic echocardiogram noted EF of 45 % and + diastolic dysfunction.  Cardiology ordered amiodarone and recommended anticoagulation post surgery.     -03/18/17-pt seen and examined today and noted to be anxious and in distress after swallowing broth from a bowel of chicken soup.  Episode witnessed by RT at bedside post Duoneb administration. Pt reported difficulty swallowing, chest pain, abdominal pain at surgical site, and SOB.  Wheezing with coarse breath sounds noted upon assessment. 100% NRB placed and EKG obtained. Chest xray showed Atelectasis.  Pt transferred to ICU and Pulmonology consulted for further evaluation. IV antibiotics initiated. In the ICu she has resp Stridor and given a dose of Racemic Epi and it resolved promptly and pt felt better-- most likely related to Laryngospasm from ETT intubation from the day earlier. Doing well now, now stridor, SOB or swallow issue.        Interval History: looks much better, so SOB or wheezing, no resp distress. Abd incisional pain better, no NVD, no R sided chest or shoulder pain, no NVD, tolerating his diet well and swallowed it well.     Review of Systems   Constitutional: Positive for fatigue. Negative for appetite change, chills, diaphoresis and fever.   HENT: Negative for congestion, nosebleeds and sore throat.    Eyes: Negative for pain, discharge and visual disturbance.   Respiratory: Negative for apnea, cough, chest tightness, shortness of breath and wheezing.    Cardiovascular: Negative for palpitations and leg swelling.   Gastrointestinal: Positive for abdominal pain. Negative for abdominal distention, blood in stool, constipation, diarrhea, nausea and vomiting.   Endocrine: Negative for cold intolerance and heat intolerance.   Genitourinary: Negative for difficulty urinating, dysuria,  flank pain, frequency and urgency.   Musculoskeletal: Positive for arthralgias. Negative for back pain, joint swelling, myalgias, neck pain and neck stiffness.            Skin: Negative for pallor, rash and wound.   Allergic/Immunologic: Negative for food allergies and immunocompromised state.   Neurological: Negative for dizziness, seizures, syncope, facial asymmetry, weakness, light-headedness and headaches.   Hematological: Negative for adenopathy.   Psychiatric/Behavioral: Negative for agitation, behavioral problems and confusion. The patient is not nervous/anxious.      Objective:     Vital Signs (Most Recent):  Temp: 98.5 °F (36.9 °C) (03/19/17 1100)  Pulse: 89 (03/19/17 1200)  Resp: (!) 32 (03/19/17 1200)  BP: 125/75 (03/19/17 1100)  SpO2: (!) 93 % (03/19/17 1200) Vital Signs (24h Range):  Temp:  [97.9 °F (36.6 °C)-99.3 °F (37.4 °C)] 98.5 °F (36.9 °C)  Pulse:  [] 89  Resp:  [16-32] 32  SpO2:  [89 %-100 %] 93 %  BP: ()/() 125/75     Weight: 95 kg (209 lb 7 oz)  Body mass index is 34.85 kg/(m^2).    Intake/Output Summary (Last 24 hours) at 03/19/17 1331  Last data filed at 03/19/17 1101   Gross per 24 hour   Intake              790 ml   Output             1500 ml   Net             -710 ml      Physical Exam   Constitutional: He is oriented to person, place, and time. He appears well-developed and well-nourished.   HENT:   Head: Normocephalic.   Nose: Nose normal.   Mouth/Throat: Oropharynx is clear and moist.   Eyes: Conjunctivae and EOM are normal. Pupils are equal, round, and reactive to light.   Neck: Normal range of motion. Neck supple. No JVD present. No tracheal deviation present.   Cardiovascular: Normal rate, regular rhythm, normal heart sounds and intact distal pulses.  Exam reveals no friction rub.    No murmur heard.  Pulmonary/Chest: Effort normal and breath sounds normal. No stridor. Tachypnea noted. He has no wheezes. He has no rales.   Abdominal: Soft. Bowel sounds are normal.  He exhibits no distension. There is generalized tenderness.   Incisions healing well   Genitourinary:   Genitourinary Comments: deferred   Musculoskeletal: Normal range of motion. He exhibits no edema or tenderness.   Neurological: He is alert and oriented to person, place, and time. He has normal reflexes.   Skin: Skin is warm and dry.   Abdominal surgical site intact   Psychiatric: He has a normal mood and affect. His behavior is normal. Judgment and thought content normal.       Significant Labs:   BMP:   Recent Labs  Lab 03/18/17  0515 03/19/17  0439   * 148*    137   K 4.5 4.4    103   CO2 24 25   BUN 9 13   CREATININE 0.9 1.0   CALCIUM 8.3* 8.6*   MG 1.8  --      CBC:   Recent Labs  Lab 03/18/17 0515 03/19/17  0439   WBC 13.36* 15.83*   HGB 12.5* 12.6*   HCT 37.7* 37.7*    165     All pertinent labs within the past 24 hours have been reviewed.    Significant Imaging: I have reviewed all pertinent imaging results/findings within the past 24 hours.    Assessment/Plan:      * Chest pain   ASA, BB Toprol dose increased, ACE lisinopril dose increased, Statin,   Cardiology stated pt with [facility] in 11/2016  1. Non-obstructive CAD.   2.   Non-ischemic Cardiomyopathy    Cardiology advised noncardiac pain    Resolved after Lap Mary-- likely referred pain from Acute cholecystitis.    Calculus of gallbladder without cholecystitis without obstruction  +ve Heard's sign-- likely has Acute Cholecystitis -- resolved after Lap Cholecystectomy on 3/17/2017    Multiple gallstones according to US  CT of ABD - multiple shadowing stones      Acute respiratory distress  Sec to Laryngospasm from previous day's intubation-- resolved with racemic Epinephrine.  Continue IS, Ambulate for Atelectasis      Hypertension  BP controlled with Norvasc, Toprol increased to 100 mg and lisinopril increased to 10 mg      Type 2 diabetes mellitus with hyperglycemia  -uncontrolled  -Accuchecks and NISS continued  - will  increase Levemir    Chronic combined systolic and diastolic congestive heart failure  Compensated  IVF held  Cont ACE  IV Lasix initiated      Obstructive sleep apnea syndrome  Cont CPAP      PAF (paroxysmal atrial fibrillation)  Telemetry monitoring  Toprol XL and amiodarone  Will need anticoagulation after surgery      Controlled type 2 diabetes mellitus with hyperglycemia, with long-term current use of insulin  See above      VTE Risk Mitigation         Ordered     enoxaparin injection 40 mg  Daily     Route:  Subcutaneous        03/15/17 1424     Medium Risk of VTE  Once      03/15/17 2132     Reason for No Pharmacological VTE Prophylaxis  Once      03/15/17 2132          Car Pineda MD  Department of Hospital Medicine   [facility]

## 2017-03-19 NOTE — ASSESSMENT & PLAN NOTE
+ve Heard's sign-- likely has Acute Cholecystitis -- resolved after Lap Cholecystectomy on 3/17/2017    Multiple gallstones according to US  CT of ABD - multiple shadowing stones

## 2017-03-19 NOTE — SUBJECTIVE & OBJECTIVE
Interval History: looks much better, so SOB or wheezing, no resp distress. Abd incisional pain better, no NVD, no R sided chest or shoulder pain, no NVD, tolerating his diet well and swallowed it well.     Review of Systems   Constitutional: Positive for fatigue. Negative for appetite change, chills, diaphoresis and fever.   HENT: Negative for congestion, nosebleeds and sore throat.    Eyes: Negative for pain, discharge and visual disturbance.   Respiratory: Negative for apnea, cough, chest tightness, shortness of breath and wheezing.    Cardiovascular: Negative for palpitations and leg swelling.   Gastrointestinal: Positive for abdominal pain. Negative for abdominal distention, blood in stool, constipation, diarrhea, nausea and vomiting.   Endocrine: Negative for cold intolerance and heat intolerance.   Genitourinary: Negative for difficulty urinating, dysuria, flank pain, frequency and urgency.   Musculoskeletal: Positive for arthralgias. Negative for back pain, joint swelling, myalgias, neck pain and neck stiffness.            Skin: Negative for pallor, rash and wound.   Allergic/Immunologic: Negative for food allergies and immunocompromised state.   Neurological: Negative for dizziness, seizures, syncope, facial asymmetry, weakness, light-headedness and headaches.   Hematological: Negative for adenopathy.   Psychiatric/Behavioral: Negative for agitation, behavioral problems and confusion. The patient is not nervous/anxious.      Objective:     Vital Signs (Most Recent):  Temp: 98.5 °F (36.9 °C) (03/19/17 1100)  Pulse: 89 (03/19/17 1200)  Resp: (!) 32 (03/19/17 1200)  BP: 125/75 (03/19/17 1100)  SpO2: (!) 93 % (03/19/17 1200) Vital Signs (24h Range):  Temp:  [97.9 °F (36.6 °C)-99.3 °F (37.4 °C)] 98.5 °F (36.9 °C)  Pulse:  [] 89  Resp:  [16-32] 32  SpO2:  [89 %-100 %] 93 %  BP: ()/() 125/75     Weight: 95 kg (209 lb 7 oz)  Body mass index is 34.85 kg/(m^2).    Intake/Output Summary (Last 24  hours) at 03/19/17 1331  Last data filed at 03/19/17 1101   Gross per 24 hour   Intake              790 ml   Output             1500 ml   Net             -710 ml      Physical Exam   Constitutional: He is oriented to person, place, and time. He appears well-developed and well-nourished.   HENT:   Head: Normocephalic.   Nose: Nose normal.   Mouth/Throat: Oropharynx is clear and moist.   Eyes: Conjunctivae and EOM are normal. Pupils are equal, round, and reactive to light.   Neck: Normal range of motion. Neck supple. No JVD present. No tracheal deviation present.   Cardiovascular: Normal rate, regular rhythm, normal heart sounds and intact distal pulses.  Exam reveals no friction rub.    No murmur heard.  Pulmonary/Chest: Effort normal and breath sounds normal. No stridor. Tachypnea noted. He has no wheezes. He has no rales.   Abdominal: Soft. Bowel sounds are normal. He exhibits no distension. There is generalized tenderness.   Incisions healing well   Genitourinary:   Genitourinary Comments: deferred   Musculoskeletal: Normal range of motion. He exhibits no edema or tenderness.   Neurological: He is alert and oriented to person, place, and time. He has normal reflexes.   Skin: Skin is warm and dry.   Abdominal surgical site intact   Psychiatric: He has a normal mood and affect. His behavior is normal. Judgment and thought content normal.       Significant Labs:   BMP:   Recent Labs  Lab 03/18/17  0515 03/19/17  0439   * 148*    137   K 4.5 4.4    103   CO2 24 25   BUN 9 13   CREATININE 0.9 1.0   CALCIUM 8.3* 8.6*   MG 1.8  --      CBC:   Recent Labs  Lab 03/18/17  0515 03/19/17  0439   WBC 13.36* 15.83*   HGB 12.5* 12.6*   HCT 37.7* 37.7*    165     All pertinent labs within the past 24 hours have been reviewed.    Significant Imaging: I have reviewed all pertinent imaging results/findings within the past 24 hours.

## 2017-03-19 NOTE — PROGRESS NOTES
"Patient refuses to wear his bipap, called EICU to report to Dr Méndez.  Patients nasal cannula placed on and patients sats are remaining 94 to 98%.  I have educated patient on what the bipap is for and he states "he wears one sometimes at home and he's not wearing that mask that he can't breath with it on". Will continue to monitor patient,he is aaox4 at this time.  "

## 2017-03-19 NOTE — PROGRESS NOTES
Cardiology Progress Note        SUBJECTIVE:     History of Present Illness:  Patient is a 61 y.o. male who presents with atypical chest pain, PAF, and RUQ tenderness/cholelithiasis s/p cholecystectomy. Patient seen and examined today, resting in bed. Complains of some abdominal fullness and tenderness. Had some mild chest pressure overnight, has since resolved. Troponin slightly elevated but lower than admission. Patient with recent LHC in 11/16 that showed non-obstructive CAD.       OBJECTIVE:     Vital Signs (Most Recent)  Temp: 98.3 °F (36.8 °C) (03/19/17 0730)  Pulse: 91 (03/19/17 0900)  Resp: 20 (03/19/17 0900)  BP: 131/80 (03/19/17 0900)  SpO2: 98 % (03/19/17 0900)    Vital Signs Range (Last 24H):  Temp:  [97.9 °F (36.6 °C)-99.3 °F (37.4 °C)]   Pulse:  []   Resp:  [16-30]   BP: ()/()   SpO2:  [89 %-100 %]     Intake/Output last 3 shifts:  I/O last 3 completed shifts:  In: 1745 [P.O.:570; I.V.:825; IV Piggyback:350]  Out: 1550 [Urine:1550]    Intake/Output this shift:  I/O this shift:  In: 100 [IV Piggyback:100]  Out: -     Review of patient's allergies indicates:  No Known Allergies    Current Facility-Administered Medications   Medication    albuterol-ipratropium 2.5mg-0.5mg/3mL nebulizer solution 3 mL    amlodipine tablet 10 mg    arformoterol nebulizer solution 15 mcg    atorvastatin tablet 40 mg    budesonide nebulizer solution 0.5 mg    cefepime in dextrose 5 % 1 gram/50 mL IVPB 1 g    dextrose 50% injection 12.5 g    dextrose 50% injection 25 g    enoxaparin injection 40 mg    furosemide injection 40 mg    gabapentin capsule 300 mg    glucagon (human recombinant) injection 1 mg    glucose chewable tablet 16 g    glucose chewable tablet 24 g    hydrocodone-acetaminophen 5-325mg per tablet 1 tablet    insulin aspart pen 1-10 Units    insulin detemir pen 12 Units    latanoprost 0.005 % ophthalmic solution 1 drop    lisinopril tablet 10 mg    metoprolol succinate  (TOPROL-XL) 24 hr tablet 100 mg    metronidazole IVPB 500 mg    morphine injection 2 mg    nitroGLYCERIN SL tablet 0.4 mg    ondansetron injection 4 mg    pantoprazole EC tablet 40 mg    promethazine (PHENERGAN) 12.5 mg in dextrose 5 % 50 mL IVPB    sertraline tablet 50 mg    tramadol tablet 50 mg     No current facility-administered medications on file prior to encounter.      Current Outpatient Prescriptions on File Prior to Encounter   Medication Sig    albuterol 90 mcg/actuation inhaler Inhale 2 puffs into the lungs every 6 (six) hours as needed for Wheezing.    amlodipine (NORVASC) 10 MG tablet Take 1 tablet (10 mg total) by mouth once daily.    aspirin (ECOTRIN) 81 MG EC tablet Take 1 tablet (81 mg total) by mouth once daily.    atorvastatin (LIPITOR) 40 MG tablet Take 1 tablet (40 mg total) by mouth every evening.    blood sugar diagnostic Strp 1 strip by Misc.(Non-Drug; Combo Route) route 6 (six) times daily.    blood-glucose meter (CONTOUR NEXT EZ METER) kit Use as instructed    fluticasone-salmeterol 100-50 mcg/dose (ADVAIR DISKUS) 100-50 mcg/dose diskus inhaler Inhale 1 puff into the lungs 2 (two) times daily. Controller    gabapentin (NEURONTIN) 300 MG capsule Take 1 capsule (300 mg total) by mouth 3 (three) times daily.    insulin glargine (LANTUS SOLOSTAR) 100 unit/mL (3 mL) InPn pen Inject 50 Units into the skin every evening.    insulin lispro (HUMALOG KWIKPEN) 100 unit/mL InPn pen 10 units subcutaneously twice times a day 10-15 min before meals    latanoprost 0.005 % ophthalmic solution Place 1 drop into both eyes every evening.    lisinopril (PRINIVIL,ZESTRIL) 5 MG tablet Take 1 tablet (5 mg total) by mouth once daily.    metformin (GLUCOPHAGE) 1000 MG tablet Take 1 tablet (1,000 mg total) by mouth 2 (two) times daily with meals.    metoprolol succinate (TOPROL-XL) 50 MG 24 hr tablet TAKE 1 TABLET EVERY DAY    naproxen (EC NAPROSYN) 500 MG EC tablet TAKE 1 TABLET(500 MG) BY  "MOUTH TWICE DAILY    nitroGLYCERIN (NITROSTAT) 0.3 MG SL tablet Place 1 tablet (0.3 mg total) under the tongue every 5 (five) minutes as needed for Chest pain. No more than 3 tablets in one day.    pantoprazole (PROTONIX) 40 MG tablet Take 1 tablet (40 mg total) by mouth once daily.    pen needle, diabetic 31 gauge x 5/16" Ndle Inject 1 pen into the skin 2 (two) times daily. Use one 2 times a day    sertraline (ZOLOFT) 50 MG tablet Take 1 tablet (50 mg total) by mouth once daily.    tiotropium (SPIRIVA WITH HANDIHALER) 18 mcg inhalation capsule Inhale 1 capsule (18 mcg total) into the lungs once daily. Controller    insulin syringe-needle U-100 1 mL 31 gauge x 5/16 Syrg        Physical Exam:  General appearance: alert, appears stated age and cooperative  Head: Normocephalic, without obvious abnormality, atraumatic  Neck: no adenopathy, no carotid bruit, no JVD  Back:  no skin lesions, erythema, or scars  Lungs:  clear to auscultation bilaterally  Chest wall: no tenderness  Heart: regular rate and rhythm, S1, S2 normal, no murmur, click, rub or gallop  Abdomen: surgical incision C/D/I; bloating/distention  Extremities: extremities normal, atraumatic, no cyanosis or edema  Skin: Skin color, texture, turgor normal. No rashes or lesions  Neurologic: Grossly normal, AAO x 3    Laboratory:  Chemistry:   Lab Results   Component Value Date     03/19/2017    K 4.4 03/19/2017     03/19/2017    CO2 25 03/19/2017    BUN 13 03/19/2017    CREATININE 1.0 03/19/2017    CALCIUM 8.6 (L) 03/19/2017     Cardiac Markers:   Lab Results   Component Value Date    TROPONINI 0.085 (H) 03/18/2017     Cardiac Markers (Last 3):   Lab Results   Component Value Date    TROPONINI 0.085 (H) 03/18/2017    TROPONINI 0.106 (H) 03/16/2017    TROPONINI 0.108 (H) 03/15/2017     CBC:   Lab Results   Component Value Date    WBC 15.83 (H) 03/19/2017    HGB 12.6 (L) 03/19/2017    HCT 37.7 (L) 03/19/2017    MCV 88 03/19/2017     " 03/19/2017     Lipids:   Lab Results   Component Value Date    CHOL 189 11/16/2016    TRIG 134 11/16/2016    HDL 42 11/16/2016     Coagulation:   Lab Results   Component Value Date    INR 1.0 03/15/2017    APTT 24.5 03/15/2017       Diagnostic Results:  ECG: Reviewed  X-Ray: Reviewed  Echo: Reviewed      ASSESSMENT/PLAN:     Patient Active Problem List   Diagnosis    ED (erectile dysfunction)    Testicular hypogonadism    Non-proliferative diabetic retinopathy, mild, both eyes    Hypertension    Diabetic polyneuropathy    Type 2 diabetes mellitus with hyperglycemia    Abnormal nuclear stress test    Coronary artery disease involving native coronary artery without angina pectoris    Chronic combined systolic and diastolic congestive heart failure    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, with long-term current use of insulin    Obstructive sleep apnea syndrome    Asthma    Delayed sleep phase syndrome    Psychophysiological insomnia    Nightmares    Sleep related gastroesophageal reflux disease    Inadequate sleep hygiene    Chest pain    Calculus of gallbladder without cholecystitis without obstruction    PAF (paroxysmal atrial fibrillation)    Controlled type 2 diabetes mellitus with hyperglycemia, with long-term current use of insulin    Flash pulmonary edema    Acute respiratory failure with hypoxia    Aspiration into airway      Patient who presents with atypical chest pain, PAF, and RUQ tenderness/cholelithiasis s/p cholecystectomy. Stable CV wise. No issues. Restart amiodarone 400 mg BID to avoid afib. Continue current cardiac meds. Needs anticoagulation once ok with surgery, would recommend Eliquis 5 mg BID.   Plan:   -Continue current cardiac meds  -Add amiodarone 400 mg BID  -Anticoagulate once ok with general surgery recommend Eliquis 5 mg BID  -Recheck CMP in AM    Chart reviewed. Dr. Bolanos examined patient and agrees  with plan as outlined above.

## 2017-03-19 NOTE — PROGRESS NOTES
"[facility] -   Critical Care Medicine  Progress Note    Patient Name: Crow Green  MRN: [id number]  Admission Date: 3/15/2017  Hospital Length of Stay: 2 days  Code Status: Full Code  Attending Provider: Car Pineda MD  Primary Care Provider: Mateo Lambert DO   Principal Problem: Calculus of gallbladder without cholecystitis without obstruction    Subjective: feels better, less pleurisy and abdominal pain     HPI: Crow Green is a 61 y.o. male with a PMH of HTN, DM, asthma, depression, and arthritis who presented to [facility] on 3/15/17 with chest pain and admitted; work up suspicious for non cardiac pain revealed cholelithiasis and on 3/17 underwent laparoscopic cholecystectomy; today while drinking broth (with RT at bedside preparing for scheduled neb treatment) he had aggressive coughing, complaint of difficulty swallowing, chest pain, surgical pain, increased work of breathing, and desaturation into upper 80s  Chest x ray and EKG were completed; he was placed on 100% NRB and is transferred to ICU for further evaluation and management.  On arrival to ICU, Mr Green is awake and alert, with mild work of breathing on 100% NRB with sats mid 90s and audible upper airway stridor, poor overall air movement, and RLL crackles on auscultation; he reports baseline SOB with minimal exertion and recently started using a "pap" at night (sees Dr Vargas) but does not were supplemental oxygen at baseline; he reports 17 yrs since diagnosis with asthma and uses his rescue albuterol frequently; he is a never smoker and is up to date on immunizations; he denies swallowing difficulty before today       Hospital/ICU Course: improved pain control    Interval History/Significant Events: afebrile    Review of Systems   Constitutional: Positive for fatigue.   Respiratory: Positive for cough and chest tightness.    Gastrointestinal: Positive for abdominal distention and abdominal pain.     Objective: "     Vital Signs (Most Recent):  Temp: 98.3 °F (36.8 °C) (03/19/17 0730)  Pulse: 89 (03/19/17 1200)  Resp: (!) 32 (03/19/17 1200)  BP: 131/80 (03/19/17 0900)  SpO2: (!) 93 % (03/19/17 1200) Vital Signs (24h Range):  Temp:  [97.9 °F (36.6 °C)-99.3 °F (37.4 °C)] 98.3 °F (36.8 °C)  Pulse:  [] 89  Resp:  [16-32] 32  SpO2:  [89 %-100 %] 93 %  BP: ()/() 131/80     Weight: 95 kg (209 lb 7 oz)  Body mass index is 34.85 kg/(m^2).      Intake/Output Summary (Last 24 hours) at 03/19/17 1226  Last data filed at 03/19/17 0805   Gross per 24 hour   Intake              790 ml   Output             1050 ml   Net             -260 ml       Physical Exam   Constitutional: He is oriented to person, place, and time. He appears well-developed and well-nourished.   HENT:   Head: Normocephalic and atraumatic.   Eyes: Conjunctivae are normal. Pupils are equal, round, and reactive to light.   Neck: Neck supple. No JVD present. No tracheal deviation present. No thyromegaly present.   Cardiovascular: Normal rate, regular rhythm and normal heart sounds.    Pulmonary/Chest: Effort normal. He has decreased breath sounds. He has rhonchi in the right lower field. He has rales in the right lower field.   Abdominal: He exhibits distension. There is guarding.   Musculoskeletal: Normal range of motion.   Lymphadenopathy:     He has no cervical adenopathy.   Neurological: He is alert and oriented to person, place, and time.   Skin: Skin is warm and dry.   Nursing note and vitals reviewed.      Vents:  Oxygen Concentration (%): 32 (03/19/17 0022)    Lines/Drains/Airways     Peripheral Intravenous Line                 Peripheral IV - Single Lumen 03/15/17 1128 Right Antecubital 4 days         Peripheral IV - Single Lumen 03/17/17 1340 Right Wrist 1 day                Significant Labs:    CBC/Anemia Profile:    Recent Labs  Lab 03/18/17  0515 03/19/17  0439   WBC 13.36* 15.83*   HGB 12.5* 12.6*   HCT 37.7* 37.7*    165   MCV 87 88    RDW 13.6 13.9        Chemistries:    Recent Labs  Lab 03/18/17  0515 03/19/17  0439    137   K 4.5 4.4    103   CO2 24 25   BUN 9 13   CREATININE 0.9 1.0   CALCIUM 8.3* 8.6*   ALBUMIN  --  3.0*   PROT  --  7.4   BILITOT  --  1.1*   ALKPHOS  --  72   ALT  --  54*   AST  --  38   MG 1.8  --        Blood Culture: No results for input(s): LABBLOO in the last 48 hours.  BMP:   Recent Labs  Lab 03/18/17  0515 03/19/17  0439   * 148*    137   K 4.5 4.4    103   CO2 24 25   BUN 9 13   CREATININE 0.9 1.0   CALCIUM 8.3* 8.6*   MG 1.8  --      CMP:   Recent Labs  Lab 03/18/17  0515 03/19/17  0439    137   K 4.5 4.4    103   CO2 24 25   * 148*   BUN 9 13   CREATININE 0.9 1.0   CALCIUM 8.3* 8.6*   PROT  --  7.4   ALBUMIN  --  3.0*   BILITOT  --  1.1*   ALKPHOS  --  72   AST  --  38   ALT  --  54*   ANIONGAP 8 9   EGFRNONAA >60 >60     POCT Glucose:   Recent Labs  Lab 03/18/17  1142 03/18/17  2206 03/19/17  1159   POCTGLUCOSE 173* 168* 212*       Significant Imaging:  CXR: I have reviewed all pertinent results/findings within the past 24 hours and my personal findings are:  atelectesis    Assessment/Plan:     Active Diagnoses:    Diagnosis Date Noted POA    PRINCIPAL PROBLEM:  Calculus of gallbladder without cholecystitis without obstruction [K80.20] 03/16/2017 Yes    Flash pulmonary edema [J81.0] 03/18/2017 No    Acute respiratory failure with hypoxia [J96.01] 03/18/2017 No    Aspiration into airway [T17.908A] 03/18/2017 No    PAF (paroxysmal atrial fibrillation) [I48.0] 03/17/2017 Unknown    Controlled type 2 diabetes mellitus with hyperglycemia, with long-term current use of insulin [E11.65, Z79.4] 03/17/2017 Not Applicable    Chest pain [R07.9] 03/15/2017 Yes    Obstructive sleep apnea syndrome [G47.33] 01/12/2017 Yes    Asthma [J45.909] 01/12/2017 Yes    Chronic combined systolic and diastolic congestive heart failure [I50.42] 12/05/2016 Yes    Type 2 diabetes  mellitus with hyperglycemia [E11.65] 11/15/2016 Yes    Hypertension [I10] 02/27/2015 Yes      Problems Resolved During this Admission:    Diagnosis Date Noted Date Resolved POA        Critical Care Medicine Daily Checklist:    A: Awake: RASS Goal/Actual Goal:    Actual: Alvarado Agitation Sedation Scale (RASS): Alert and calm   B: Spontaneous Breathing Trial Performed?     C: SAT & SBT Coordinated?  na                      D: Delirium: CAM-ICU Overall CAM-ICU: Negative   E: Early Mobility Performed? Yes   F: Feeding Goal:    Status:     Current Diet Order   Procedures    Diet Diabetic 1800 Calories      AS: Analgesia/Sedation adequate   T: Thromboembolic Prophylaxis yes   H: HOB > 300 Yes   U: Stress Ulcer Prophylaxis (if needed) yes   G: Glucose Control good   B: Bowel Function Stool Occurrence: 0   I: Indwelling Catheter (Lines & Luis) Necessity needed   D: De-escalation of Antimicrobials/Pharmacotherapies na    Plan for the day/ETD OOB    Code Status:  Family/Goals of Care: Full Code  duscyssed     Critical Care Time: 35 williams  Critical secondary to Patient has a condition that poses threat to life and bodily function: Severe Respiratory Distress and Peritonitis      Critical care was time spent personally by me on the following activities: development of treatment plan with patient or surrogate and bedside caregivers, discussions with consultants, evaluation of patient's response to treatment, examination of patient, ordering and performing treatments and interventions, ordering and review of laboratory studies, ordering and review of radiographic studies, pulse oximetry, re-evaluation of patient's condition.  This critical care time did not overlap with that of any other provider or involve time for any procedures.     Juancho Alicea MD  Critical Care Medicine  [facility]

## 2017-03-19 NOTE — PROGRESS NOTES
"[facility]  General Surgery  Progress Note    Subjective:     History of Present Illness:  62 y/o male referred for + kendrick sign/ abdominal pain. The patient was admitted for chest pain and right shoulder pain. He has some associated nausea and diarrhea. Denies any fever/chills, abdominal pain prior to this or with eating. He was noted to have mildly elevated troponin which was cardiology felt due to noncardiac etiology. CT chest showed cholelithiasis. The patient was noted to have significant RUQ ttp so U/S done also showing cholelithiasis without any inflammatory changes. The patient reports previous removal of "fist size"gallstones in the 70's and has right subcostal incision.    Post-Op Info:  Procedure(s) (LRB):  CHOLECYSTECTOMY-LAPAROSCOPIC (N/A)   2 Days Post-Op     No new subjective & objective note has been filed under this hospital service since the last note was generated.    Assessment/Plan:     * Calculus of gallbladder without cholecystitis without obstruction  Discharge tomorrow.        Lito Brewer MD  General Surgery  [facility]  "

## 2017-03-19 NOTE — PT/OT/SLP EVAL
"Speech Language Pathology  Evaluation    Crow Green   MRN: [id number]   Admitting Diagnosis: Calculus of gallbladder without cholecystitis without obstruction    Diet recommendations: Solid Diet Level: Mechanical soft  Liquid Diet Level: Thin Feed only when awake/alert and HOB to 90 degrees    SLP Treatment Date: 03/19/17  Speech Start Time: 0830     Speech Stop Time: 0850     Speech Total (min): 20 min       TREATMENT BILLABLE MINUTES:  Eval Swallow and Oral Function 20    Diagnosis: Calculus of gallbladder without cholecystitis without obstruction  Dry mouth and dysphagia    Past Medical History:   Diagnosis Date    Arthritis     Asthma     Depression     Diabetes mellitus     Diabetes mellitus, type 2 2000    Elevated PSA     Hypertension      History reviewed. No pertinent surgical history.    Has the patient been evaluated by SLP for swallowing? : Yes  Keep patient NPO?: No   General Precautions: Standard,            Social Hx: Patient lives with his father/family    Prior diet: Regular consistency with thin liquids(no restrictions).    Occupational/hobbies/homemaking: unknown.    Subjective:  Pt was alert   Patient goals: " I just don't want it to hurt when I'm eating".                        Objective:       Pt was given trials of PO with no overt signs of aspiration and no complaints of pain. Oral motor and lingual movement was adequate and no signs of oral thrush noted.    Bedside Swallow Eval:  Consistencies Assessed: Thin liquids x8, Puree x3 and Soft solids x5  Oral Phase: dry mouth  Pharyngeal Phase: no overt clinical  signs/symptoms of aspiration    Additional Treatment:      FIM:                                 Assessment:  Crow Green is a 61 y.o. male with a medical diagnosis of Calculus of gallbladder without cholecystitis without obstruction and presents with mild dysphagia.  Pt stated his issues arose since his sx and he has also been experiencing dryness of mouth. Pt " recommended for follow-up with ST services for ongoing swallow assessment.           Discharge recommendations: Discharge Facility/Level Of Care Needs: home     Goals:   SLP Goals        Problem: SLP Goal    Goal Priority Disciplines Outcome   SLP Goal     SLP    Description:  1.  ST will assess swallow safety with regular solid consistency.    2.  Pt will tolerate all consistencies and liquids with no overt s/s of aspiration or oral residue.                Plan:   Patient to be seen Therapy Frequency: 3 x/week   Plan of Care expires:    Plan of Care reviewed with: patient  SLP Follow-up?: Yes  SLP - Next Visit Date: 03/20/17           Justin Fontaine CCC-SLP  03/19/2017

## 2017-03-19 NOTE — PLAN OF CARE
Problem: Patient Care Overview  Goal: Plan of Care Review  Outcome: Ongoing (interventions implemented as appropriate)  Pt had speech eval today, which he passed; able to eat meals without any choking; VSS; down-graded to tele pt; SHANNON.

## 2017-03-20 VITALS
WEIGHT: 209.44 LBS | DIASTOLIC BLOOD PRESSURE: 98 MMHG | HEIGHT: 65 IN | SYSTOLIC BLOOD PRESSURE: 135 MMHG | OXYGEN SATURATION: 96 % | HEART RATE: 85 BPM | TEMPERATURE: 99 F | RESPIRATION RATE: 22 BRPM | BODY MASS INDEX: 34.89 KG/M2

## 2017-03-20 PROBLEM — R07.9 CHEST PAIN: Status: RESOLVED | Noted: 2017-03-15 | Resolved: 2017-03-20

## 2017-03-20 PROBLEM — K80.66 CALCULUS OF GALLBLADDER AND BILE DUCT WITH ACUTE ON CHRONIC CHOLECYSTITIS: Status: RESOLVED | Noted: 2017-03-20 | Resolved: 2017-03-20

## 2017-03-20 PROBLEM — K80.66 CALCULUS OF GALLBLADDER AND BILE DUCT WITH ACUTE ON CHRONIC CHOLECYSTITIS: Status: ACTIVE | Noted: 2017-03-20

## 2017-03-20 PROBLEM — R06.03 ACUTE RESPIRATORY DISTRESS: Status: RESOLVED | Noted: 2017-03-19 | Resolved: 2017-03-20

## 2017-03-20 PROBLEM — K80.20 CALCULUS OF GALLBLADDER WITHOUT CHOLECYSTITIS WITHOUT OBSTRUCTION: Status: RESOLVED | Noted: 2017-03-16 | Resolved: 2017-03-20

## 2017-03-20 LAB
ALBUMIN SERPL BCP-MCNC: 2.8 G/DL
ALP SERPL-CCNC: 71 U/L
ALT SERPL W/O P-5'-P-CCNC: 37 U/L
ANION GAP SERPL CALC-SCNC: 9 MMOL/L
AST SERPL-CCNC: 22 U/L
BILIRUB SERPL-MCNC: 0.6 MG/DL
BUN SERPL-MCNC: 18 MG/DL
CALCIUM SERPL-MCNC: 8.4 MG/DL
CHLORIDE SERPL-SCNC: 101 MMOL/L
CO2 SERPL-SCNC: 24 MMOL/L
CREAT SERPL-MCNC: 1.1 MG/DL
EST. GFR  (AFRICAN AMERICAN): >60 ML/MIN/1.73 M^2
EST. GFR  (NON AFRICAN AMERICAN): >60 ML/MIN/1.73 M^2
GLUCOSE SERPL-MCNC: 228 MG/DL
POCT GLUCOSE: 158 MG/DL (ref 70–110)
POTASSIUM SERPL-SCNC: 3.7 MMOL/L
PROT SERPL-MCNC: 7.1 G/DL
SODIUM SERPL-SCNC: 134 MMOL/L

## 2017-03-20 PROCEDURE — 96374 THER/PROPH/DIAG INJ IV PUSH: CPT

## 2017-03-20 PROCEDURE — 96372 THER/PROPH/DIAG INJ SC/IM: CPT

## 2017-03-20 PROCEDURE — 94799 UNLISTED PULMONARY SVC/PX: CPT

## 2017-03-20 PROCEDURE — 63600175 PHARM REV CODE 636 W HCPCS: Performed by: NURSE PRACTITIONER

## 2017-03-20 PROCEDURE — 82962 GLUCOSE BLOOD TEST: CPT

## 2017-03-20 PROCEDURE — 63600175 PHARM REV CODE 636 W HCPCS: Performed by: EMERGENCY MEDICINE

## 2017-03-20 PROCEDURE — 25000003 PHARM REV CODE 250: Performed by: NURSE PRACTITIONER

## 2017-03-20 PROCEDURE — 36415 COLL VENOUS BLD VENIPUNCTURE: CPT

## 2017-03-20 PROCEDURE — 25000003 PHARM REV CODE 250: Performed by: EMERGENCY MEDICINE

## 2017-03-20 PROCEDURE — 25000003 PHARM REV CODE 250: Performed by: PHYSICIAN ASSISTANT

## 2017-03-20 PROCEDURE — 80053 COMPREHEN METABOLIC PANEL: CPT

## 2017-03-20 PROCEDURE — 94640 AIRWAY INHALATION TREATMENT: CPT

## 2017-03-20 PROCEDURE — 25000242 PHARM REV CODE 250 ALT 637 W/ HCPCS: Performed by: NURSE PRACTITIONER

## 2017-03-20 RX ORDER — AMIODARONE HYDROCHLORIDE 400 MG/1
400 TABLET ORAL DAILY
Qty: 30 TABLET | Refills: 1 | Status: SHIPPED | OUTPATIENT
Start: 2017-03-20 | End: 2017-04-28 | Stop reason: DRUGHIGH

## 2017-03-20 RX ORDER — HYDROCODONE BITARTRATE AND ACETAMINOPHEN 5; 325 MG/1; MG/1
1 TABLET ORAL EVERY 6 HOURS PRN
Qty: 20 TABLET | Refills: 0 | Status: SHIPPED | OUTPATIENT
Start: 2017-03-20 | End: 2017-03-28

## 2017-03-20 RX ADMIN — AMLODIPINE BESYLATE 10 MG: 10 TABLET ORAL at 10:03

## 2017-03-20 RX ADMIN — PANTOPRAZOLE SODIUM 40 MG: 40 TABLET, DELAYED RELEASE ORAL at 10:03

## 2017-03-20 RX ADMIN — IPRATROPIUM BROMIDE AND ALBUTEROL SULFATE 3 ML: .5; 3 SOLUTION RESPIRATORY (INHALATION) at 12:03

## 2017-03-20 RX ADMIN — CEFEPIME HYDROCHLORIDE 1 G: 1 INJECTION, POWDER, FOR SOLUTION INTRAMUSCULAR; INTRAVENOUS at 03:03

## 2017-03-20 RX ADMIN — SERTRALINE HYDROCHLORIDE 50 MG: 50 TABLET, FILM COATED ORAL at 10:03

## 2017-03-20 RX ADMIN — BUDESONIDE 0.5 MG: 0.5 SUSPENSION RESPIRATORY (INHALATION) at 07:03

## 2017-03-20 RX ADMIN — IPRATROPIUM BROMIDE AND ALBUTEROL SULFATE 3 ML: .5; 3 SOLUTION RESPIRATORY (INHALATION) at 07:03

## 2017-03-20 RX ADMIN — AMIODARONE HYDROCHLORIDE 400 MG: 200 TABLET ORAL at 10:03

## 2017-03-20 RX ADMIN — ARFORMOTEROL TARTRATE 15 MCG: 15 SOLUTION RESPIRATORY (INHALATION) at 07:03

## 2017-03-20 RX ADMIN — INSULIN ASPART 2 UNITS: 100 INJECTION, SOLUTION INTRAVENOUS; SUBCUTANEOUS at 06:03

## 2017-03-20 RX ADMIN — METRONIDAZOLE 500 MG: 500 INJECTION, SOLUTION INTRAVENOUS at 07:03

## 2017-03-20 RX ADMIN — MORPHINE SULFATE 2 MG: 2 INJECTION, SOLUTION INTRAMUSCULAR; INTRAVENOUS at 06:03

## 2017-03-20 RX ADMIN — LISINOPRIL 10 MG: 10 TABLET ORAL at 10:03

## 2017-03-20 RX ADMIN — GABAPENTIN 300 MG: 300 CAPSULE ORAL at 06:03

## 2017-03-20 RX ADMIN — METOPROLOL SUCCINATE 100 MG: 50 TABLET, EXTENDED RELEASE ORAL at 10:03

## 2017-03-20 NOTE — PLAN OF CARE
Problem: Patient Care Overview  Goal: Plan of Care Review  Outcome: Ongoing (interventions implemented as appropriate)  poc reviewed c pt.  No interval change overnight.  Pt independent and no frequent c/o pain.

## 2017-03-20 NOTE — PLAN OF CARE
Problem: Patient Care Overview  Goal: Plan of Care Review  Outcome: Outcome(s) achieved Date Met:  03/20/17  Pt being discharged home via medicare transportation arranged by case management; pt has received discharge orders; reviewed with RN; questions answered; understanding verbalized; VSS; will leave upon arrival of transportation.

## 2017-03-20 NOTE — PLAN OF CARE
Discharge Final note completed. Patient needs transportation assistance to home. CM placed call to [employer]/ursula [phone], spoke to Faboila MARCOS Transport to home set up completed, reference#[id number].   will call [phone] upon arrival. ICU nurse Keeley notified of discharge transportation.

## 2017-03-20 NOTE — DISCHARGE SUMMARY
"[facility] Hospital Medicine  Discharge Summary      Patient Name: Crow Green  MRN: [id number]  Admission Date: 3/15/2017  Hospital Length of Stay: 3 days  Discharge Date and Time:  03/20/2017 10:36 AM  Attending Physician: Car Pineda MD   Discharging Provider: Car Pineda MD  Primary Care Provider: Mateo Lambert DO      HPI:   The pt is a 62 yo AAM with NICM Ef 40%, nonobstructive CAD ([facility] 11/2016), DM, HTN, Asthma who presented to ED with chest pain and right shoulder pain x 4 days. Pt reports 4 days ago, he had onset of right shoulder pain at rest -worse with arm movement and better with rest. Later that night, he had substernal chest pain while in bed rated 7/10 described as sharp. He took NTG and went to bed. He then woke up and felt chest pain -on /off since then.The pain is worsened with deep breaths, when he attempts to sit up from a laying position. The pt also reports nausea and diarrhea. He had multiple  "watery" stools for the last 4 days that resolved today. Denies SOB, edema, weight gain. He does endorse dizziness- however, he reports dizziness on/off for several months.   In the ED, EKG showed NSR with incomplete LBB. Troponin 0.119>0.108 (last troponin 11/16/16 was 0.205) CTA chest showed no PE, patchy ground glass opacities could be related to gravitational change/dependent atelectasis, infectious/inflammatory etiology or less likely pulmonary edema.       Procedure(s) (LRB):  CHOLECYSTECTOMY-LAPAROSCOPIC (N/A)      Indwelling Lines/Drains at time of discharge:   Lines/Drains/Airways          No matching active lines, drains, or airways        Hospital Course:   With concerns for ACS and chest pain, Cardiology saw the patient and felt pain was non-cardiac in nature and advised General Surgery evaluation for possible symptomatic cholelithiasis. There was a + Heard's sign. CTA of chest suggested cholelithiasis. RUQ ultrasound found cholelithiasis, nondistended " gallbladder, no pericholecystic fluid or wall thickening. There were multiple shadowing gallstones. CT of ABD/Pelvis with contrast indicated cholelithiasis.  A cholecystectomy was scheduled for patient. Cardiology stated that pt had moderate risk for cardiac event during surgery. Subsequently, during Telemetry, intermittent runs of PAF were noted, hence started on Amiodarone and Eliquis. Toprol XL was increased to 100 mg daily. Transthoracic echocardiogram noted EF of 45 % and + diastolic dysfunction.  Cardiology ordered amiodarone and recommended anticoagulation post surgery.     Next day Surgery cleared him to be discharged but he was noted to be anxious and in resp distress and transferred to ICU immediately with wheezing and coarse breath sounds noted upon assessment. 100% NRB placed and EKG obtained. Chest xray showed RLL Atelectasis. In the ICU, he had obvious Stridor and given a dose of Racemic Epi and it resolved promptly and pt felt better-- most likely related to Laryngospasm from ETT intubation from the day earlier. Did well later without any further stridor or SOB or swallow issue. He was observed in the hospital for a couple of days. Repeat CXR next day also showed improvement in his post op Atelectasis of RLL. He is eating drinking well, walking around well, no SOB or Resp Distress and all his Symptoms since admission have resolved. He was seen and examined and deemed stable stable to be discharged home today. He will be on Amiodarone and Eliquis for his PAF as well.        Consults:   Consults         Status Ordering Provider     Inpatient consult to Cardiology  Once     Provider:  Jim Bolanos MD    Completed AUBRIE HYDE     Inpatient consult to General Surgery  Once     Provider:  (Not yet assigned)    Completed TODD PULIDO     Inpatient consult to Hospitalist  Once     Provider:  (Not yet assigned)    Acknowledged AUBRIE HYDE     Inpatient consult to Pulmonology  Once      Provider:  Juancho Alicea MD    Completed KELSI FALK          Significant Diagnostic Studies: Labs:   BMP:   Recent Labs  Lab 03/19/17 0439 03/20/17 0257   * 228*    134*   K 4.4 3.7    101   CO2 25 24   BUN 13 18   CREATININE 1.0 1.1   CALCIUM 8.6* 8.4*   , CMP   Recent Labs  Lab 03/19/17 0439 03/20/17 0257    134*   K 4.4 3.7    101   CO2 25 24   * 228*   BUN 13 18   CREATININE 1.0 1.1   CALCIUM 8.6* 8.4*   PROT 7.4 7.1   ALBUMIN 3.0* 2.8*   BILITOT 1.1* 0.6   ALKPHOS 72 71   AST 38 22   ALT 54* 37   ANIONGAP 9 9   ESTGFRAFRICA >60 >60   EGFRNONAA >60 >60   , CBC   Recent Labs  Lab 03/19/17 0439   WBC 15.83*   HGB 12.6*   HCT 37.7*          Pending Diagnostic Studies:     None        Final Active Diagnoses:    Diagnosis Date Noted POA    PAF (paroxysmal atrial fibrillation) [I48.0] 03/17/2017 Yes    Controlled type 2 diabetes mellitus with hyperglycemia, with long-term current use of insulin [E11.65, Z79.4] 03/17/2017 Not Applicable    Obstructive sleep apnea syndrome [G47.33] 01/12/2017 Yes    Asthma [J45.909] 01/12/2017 Yes    Chronic combined systolic and diastolic congestive heart failure [I50.42] 12/05/2016 Yes    Hypertension [I10] 02/27/2015 Yes      Problems Resolved During this Admission:    Diagnosis Date Noted Date Resolved POA    PRINCIPAL PROBLEM:  Calculus of gallbladder and bile duct with acute on chronic cholecystitis [K80.66] 03/20/2017 03/20/2017 Yes    Chest pain [R07.9] 03/15/2017 03/20/2017 Yes    Calculus of gallbladder without cholecystitis without obstruction [K80.20] 03/16/2017 03/20/2017 Yes    Acute respiratory distress [R06.00] 03/19/2017 03/20/2017 Yes    Type 2 diabetes mellitus with hyperglycemia [E11.65] 11/15/2016 03/20/2017 Yes      No new Assessment & Plan notes have been filed under this hospital service since the last note was generated.  Service: Hospital Medicine      Discharged Condition:  stable    Disposition: Home or Self Care    Follow Up:  Follow-up Information     Follow up with Louis O. Jeansonne, MD. Schedule an appointment as soon as possible for a visit today.    Specialty:  General Surgery    Contact information:    [id number] [place] [address] [address] [place] [address] [phone]          Follow up with Mateo Lambert DO. Schedule an appointment as soon as possible for a visit in 1 week.    Specialty:  Family Medicine    Why:  and , If symptoms worsen    Contact information:    [address] [address] [place] [address] [phone]          Patient Instructions:     Diet general   Order Specific Question Answer Comments   Na restriction, if any: 2gNa    Additional restrictions: Diabetic 1800      Activity as tolerated       Medications:  Reconciled Home Medications:   Current Discharge Medication List      START taking these medications    Details   amiodarone (PACERONE) 400 MG tablet Take 1 tablet (400 mg total) by mouth once daily.  Qty: 30 tablet, Refills: 1      hydrocodone-acetaminophen 5-325mg (NORCO) 5-325 mg per tablet Take 1 tablet by mouth every 6 (six) hours as needed for Pain.  Qty: 20 tablet, Refills: 0         CONTINUE these medications which have NOT CHANGED    Details   albuterol 90 mcg/actuation inhaler Inhale 2 puffs into the lungs every 6 (six) hours as needed for Wheezing.  Qty: 18 g, Refills: 11    Associated Diagnoses: Moderate asthma      amlodipine (NORVASC) 10 MG tablet Take 1 tablet (10 mg total) by mouth once daily.  Qty: 90 tablet, Refills: 3    Associated Diagnoses: Hypertension associated with diabetes      aspirin (ECOTRIN) 81 MG EC tablet Take 1 tablet (81 mg total) by mouth once daily.  Refills: 0      atorvastatin (LIPITOR) 40 MG tablet Take 1 tablet (40 mg total) by mouth every evening.  Qty: 90 tablet, Refills: 3    Associated Diagnoses: Hyperlipidemia associated with type 2 diabetes mellitus      blood sugar diagnostic Strp 1 strip by Misc.(Non-Drug; Combo  Route) route 6 (six) times daily.  Qty: 160 strip, Refills: 11    Comments: Patient uses Contour Next strips. He is on intensive insulin therapy and it is medically necessary to check before meals and 2 hour hours after meals as directed.      blood-glucose meter (CONTOUR NEXT EZ METER) kit Use as instructed  Qty: 1 each, Refills: 0      fluticasone-salmeterol 100-50 mcg/dose (ADVAIR DISKUS) 100-50 mcg/dose diskus inhaler Inhale 1 puff into the lungs 2 (two) times daily. Controller  Qty: 180 each, Refills: 3    Associated Diagnoses: Moderate asthma      gabapentin (NEURONTIN) 300 MG capsule Take 1 capsule (300 mg total) by mouth 3 (three) times daily.  Qty: 270 capsule, Refills: 3    Associated Diagnoses: Diabetic peripheral neuropathy associated with type 2 diabetes mellitus      insulin glargine (LANTUS SOLOSTAR) 100 unit/mL (3 mL) InPn pen Inject 50 Units into the skin every evening.  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  Qty: 1 Bottle, Refills: 4    Associated Diagnoses: Chronic open angle glaucoma, stage unspecified      lisinopril (PRINIVIL,ZESTRIL) 5 MG tablet Take 1 tablet (5 mg total) by mouth once daily.  Qty: 90 tablet, Refills: 2      metformin (GLUCOPHAGE) 1000 MG tablet Take 1 tablet (1,000 mg total) by mouth 2 (two) times daily with meals.  Qty: 180 tablet, Refills: 3    Associated Diagnoses: Type 2 diabetes  "mellitus with mild nonproliferative retinopathy, with long-term current use of insulin, macular edema presence unspecified, unspecified laterality      metoprolol succinate (TOPROL-XL) 50 MG 24 hr tablet TAKE 1 TABLET EVERY DAY  Qty: 30 tablet, Refills: 5      nitroGLYCERIN (NITROSTAT) 0.3 MG SL tablet Place 1 tablet (0.3 mg total) under the tongue every 5 (five) minutes as needed for Chest pain. No more than 3 tablets in one day.  Qty: 30 tablet, Refills: 1    Associated Diagnoses: Angina at rest      pantoprazole (PROTONIX) 40 MG tablet Take 1 tablet (40 mg total) by mouth once daily.  Qty: 30 tablet, Refills: 11    Associated Diagnoses: Gastroesophageal reflux disease, esophagitis presence not specified      pen needle, diabetic 31 gauge x 5/16" Ndle Inject 1 pen into the skin 2 (two) times daily. 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  Qty: 90 tablet, Refills: 3    Associated Diagnoses: Depression, unspecified depression type      tiotropium (SPIRIVA WITH HANDIHALER) 18 mcg inhalation capsule Inhale 1 capsule (18 mcg total) into the lungs once daily. Controller  Qty: 90 capsule, Refills: 3    Associated Diagnoses: Moderate asthma      insulin syringe-needle U-100 1 mL 31 gauge x 5/16 Syrg          STOP taking these medications       naproxen (EC NAPROSYN) 500 MG EC tablet Comments:   Reason for Stopping:             Time spent on the discharge of patient: 53 minutes    Car Pineda MD  Department of Hospital Medicine  [facility]  "

## 2017-03-21 ENCOUNTER — HOSPITAL ENCOUNTER (OUTPATIENT)
Facility: HOSPITAL | Age: 62
Discharge: HOME HEALTH | End: 2017-03-24
Attending: EMERGENCY MEDICINE | Admitting: SURGERY
Payer: MEDICARE

## 2017-03-21 DIAGNOSIS — T88.8XXA FLUID COLLECTION AT SURGICAL SITE, INITIAL ENCOUNTER: ICD-10-CM

## 2017-03-21 DIAGNOSIS — R10.11 RUQ ABDOMINAL PAIN: Primary | ICD-10-CM

## 2017-03-21 LAB
ALBUMIN SERPL BCP-MCNC: 2.8 G/DL
ALP SERPL-CCNC: 62 U/L
ALT SERPL W/O P-5'-P-CCNC: 29 U/L
ANION GAP SERPL CALC-SCNC: 10 MMOL/L
AST SERPL-CCNC: 21 U/L
BASOPHILS # BLD AUTO: 0.01 K/UL
BASOPHILS NFR BLD: 0.1 %
BILIRUB SERPL-MCNC: 0.5 MG/DL
BUN SERPL-MCNC: 13 MG/DL
CALCIUM SERPL-MCNC: 9 MG/DL
CHLORIDE SERPL-SCNC: 101 MMOL/L
CO2 SERPL-SCNC: 23 MMOL/L
CREAT SERPL-MCNC: 1 MG/DL
DIFFERENTIAL METHOD: ABNORMAL
EOSINOPHIL # BLD AUTO: 0.2 K/UL
EOSINOPHIL NFR BLD: 1.8 %
ERYTHROCYTE [DISTWIDTH] IN BLOOD BY AUTOMATED COUNT: 13.5 %
EST. GFR  (AFRICAN AMERICAN): >60 ML/MIN/1.73 M^2
EST. GFR  (NON AFRICAN AMERICAN): >60 ML/MIN/1.73 M^2
GLUCOSE SERPL-MCNC: 295 MG/DL
HCT VFR BLD AUTO: 32.1 %
HGB BLD-MCNC: 10.7 G/DL
LIPASE SERPL-CCNC: 54 U/L
LYMPHOCYTES # BLD AUTO: 2.3 K/UL
LYMPHOCYTES NFR BLD: 22.4 %
MCH RBC QN AUTO: 28.7 PG
MCHC RBC AUTO-ENTMCNC: 33.3 %
MCV RBC AUTO: 86 FL
MONOCYTES # BLD AUTO: 0.9 K/UL
MONOCYTES NFR BLD: 8.9 %
NEUTROPHILS # BLD AUTO: 6.8 K/UL
NEUTROPHILS NFR BLD: 66.8 %
PLATELET # BLD AUTO: 192 K/UL
PMV BLD AUTO: 10.7 FL
POTASSIUM SERPL-SCNC: 3.9 MMOL/L
PROT SERPL-MCNC: 7 G/DL
RBC # BLD AUTO: 3.73 M/UL
SODIUM SERPL-SCNC: 134 MMOL/L
WBC # BLD AUTO: 10.21 K/UL

## 2017-03-21 PROCEDURE — 83690 ASSAY OF LIPASE: CPT

## 2017-03-21 PROCEDURE — 96375 TX/PRO/DX INJ NEW DRUG ADDON: CPT

## 2017-03-21 PROCEDURE — 25000003 PHARM REV CODE 250: Performed by: EMERGENCY MEDICINE

## 2017-03-21 PROCEDURE — 96365 THER/PROPH/DIAG IV INF INIT: CPT

## 2017-03-21 PROCEDURE — 25500020 PHARM REV CODE 255: Performed by: EMERGENCY MEDICINE

## 2017-03-21 PROCEDURE — 99285 EMERGENCY DEPT VISIT HI MDM: CPT

## 2017-03-21 PROCEDURE — 63600175 PHARM REV CODE 636 W HCPCS: Performed by: EMERGENCY MEDICINE

## 2017-03-21 PROCEDURE — 96361 HYDRATE IV INFUSION ADD-ON: CPT

## 2017-03-21 PROCEDURE — 85025 COMPLETE CBC W/AUTO DIFF WBC: CPT

## 2017-03-21 PROCEDURE — 80053 COMPREHEN METABOLIC PANEL: CPT

## 2017-03-21 PROCEDURE — 96376 TX/PRO/DX INJ SAME DRUG ADON: CPT

## 2017-03-21 RX ORDER — HYDROMORPHONE HYDROCHLORIDE 2 MG/ML
0.5 INJECTION, SOLUTION INTRAMUSCULAR; INTRAVENOUS; SUBCUTANEOUS
Status: COMPLETED | OUTPATIENT
Start: 2017-03-21 | End: 2017-03-21

## 2017-03-21 RX ORDER — HYDROMORPHONE HYDROCHLORIDE 2 MG/ML
1 INJECTION, SOLUTION INTRAMUSCULAR; INTRAVENOUS; SUBCUTANEOUS
Status: ACTIVE | OUTPATIENT
Start: 2017-03-21 | End: 2017-03-22

## 2017-03-21 RX ADMIN — PROMETHAZINE HYDROCHLORIDE 12.5 MG: 25 INJECTION, SOLUTION INTRAMUSCULAR; INTRAVENOUS at 10:03

## 2017-03-21 RX ADMIN — SODIUM CHLORIDE 1000 ML: 0.9 INJECTION, SOLUTION INTRAVENOUS at 10:03

## 2017-03-21 RX ADMIN — IOHEXOL 30 ML: 350 INJECTION, SOLUTION INTRAVENOUS at 10:03

## 2017-03-21 RX ADMIN — HYDROMORPHONE HYDROCHLORIDE 0.5 MG: 2 INJECTION, SOLUTION INTRAMUSCULAR; INTRAVENOUS; SUBCUTANEOUS at 10:03

## 2017-03-21 NOTE — IP AVS SNAPSHOT
[facility]  [id number] [place] [address] [address] [place] [address]           Patient Discharge Instructions     Our goal is to set you up for success. This packet includes information on your condition, medications, and your home care. It will help you to care for yourself so you don't get sicker and need to go back to the hospital.     Please ask your nurse if you have any questions.        There are many details to remember when preparing to leave the hospital. Here is what you will need to do:    1. Take your medicine. If you are prescribed medications, review your Medication List in the following pages. You may have new medications to  at the pharmacy and others that you'll need to stop taking. Review the instructions for how and when to take your medications. Talk with your doctor or nurses if you are unsure of what to do.     2. Go to your follow-up appointments. Specific follow-up information is listed in the following pages. Your may be contacted by a transition nurse or clinical provider about future appointments. Be sure we have all of the phone numbers to reach you, if needed. Please contact your provider's office if you are unable to make an appointment.     3. Watch for warning signs. Your doctor or nurse will give you detailed warning signs to watch for and when to call for assistance. These instructions may also include educational information about your condition. If you experience any of warning signs to your health, call your doctor.               [facility]  Unless otherwise directed by your provider, please contact [facility], our nurse care line that is available for 24/7 assistance.     [phone] (toll-free)    Registered nurses in the [facility] provide clinical advisement, health education, appointment booking, and other advisory services.                    ** Verify the list of medication(s) below is accurate and up to date. Carry this with you  in case of emergency. If your medications have changed, please notify your healthcare provider.             Medication List      CONTINUE taking these medications        Additional Info                      albuterol 90 mcg/actuation inhaler   Quantity:  18 g   Refills:  11   Dose:  2 puff    Instructions:  Inhale 2 puffs into the lungs every 6 (six) hours as needed for Wheezing.     Begin Date    AM    Noon    PM    Bedtime       amiodarone 400 MG tablet   Commonly known as:  PACERONE   Quantity:  30 tablet   Refills:  1   Dose:  400 mg    Last time this was given:  400 mg on 3/24/2017  8:34 AM   Instructions:  Take 1 tablet (400 mg total) by mouth once daily.     Begin Date    AM    Noon    PM    Bedtime       amlodipine 10 MG tablet   Commonly known as:  NORVASC   Quantity:  90 tablet   Refills:  3   Dose:  10 mg    Last time this was given:  10 mg on 3/24/2017  8:34 AM   Instructions:  Take 1 tablet (10 mg total) by mouth once daily.     Begin Date    AM    Noon    PM    Bedtime       apixaban 5 mg Tab   Quantity:  60 tablet   Refills:  1   Dose:  5 mg    Last time this was given:  5 mg on 3/24/2017  8:34 AM   Instructions:  Take 1 tablet (5 mg total) by mouth 2 (two) times daily.     Begin Date    AM    Noon    PM    Bedtime       aspirin 81 MG EC tablet   Commonly known as:  ECOTRIN   Refills:  0   Dose:  81 mg    Last time this was given:  81 mg on 3/24/2017  8:34 AM   Instructions:  Take 1 tablet (81 mg total) by mouth once daily.     Begin Date    AM    Noon    PM    Bedtime       atorvastatin 40 MG tablet   Commonly known as:  LIPITOR   Quantity:  90 tablet   Refills:  3   Dose:  40 mg    Last time this was given:  40 mg on 3/23/2017  8:30 PM   Instructions:  Take 1 tablet (40 mg total) by mouth every evening.     Begin Date    AM    Noon    PM    Bedtime       blood sugar diagnostic Strp   Quantity:  160 strip   Refills:  11   Dose:  1 strip   Indications:  Uncontrolled Diabetes Mellitus   Comments:   Patient uses Contour Next strips. He is on intensive insulin therapy and it is medically necessary to check before meals and 2 hour hours after meals as directed.    Instructions:  1 strip by Misc.(Non-Drug; Combo Route) route 6 (six) times daily.     Begin Date    AM    Noon    PM    Bedtime       blood-glucose meter kit   Commonly known as:  CONTOUR NEXT EZ METER   Quantity:  1 each   Refills:  0    Instructions:  Use as instructed     Begin Date    AM    Noon    PM    Bedtime       fluticasone-salmeterol 100-50 mcg/dose 100-50 mcg/dose diskus inhaler   Commonly known as:  ADVAIR DISKUS   Quantity:  180 each   Refills:  3   Dose:  1 puff    Instructions:  Inhale 1 puff into the lungs 2 (two) times daily. Controller     Begin Date    AM    Noon    PM    Bedtime       gabapentin 300 MG capsule   Commonly known as:  NEURONTIN   Quantity:  270 capsule   Refills:  3   Dose:  300 mg    Last time this was given:  300 mg on 3/24/2017  2:50 PM   Instructions:  Take 1 capsule (300 mg total) by mouth 3 (three) times daily.     Begin Date    AM    Noon    PM    Bedtime       hydrocodone-acetaminophen 5-325mg 5-325 mg per tablet   Commonly known as:  NORCO   Quantity:  20 tablet   Refills:  0   Dose:  1 tablet    Instructions:  Take 1 tablet by mouth every 6 (six) hours as needed for Pain.     Begin Date    AM    Noon    PM    Bedtime       insulin glargine 100 unit/mL (3 mL) Inpn pen   Commonly known as:  LANTUS SOLOSTAR   Quantity:  2 Box   Refills:  11   Dose:  50 Units    Instructions:  Inject 50 Units into the skin every evening.     Begin Date    AM    Noon    PM    Bedtime       insulin lispro 100 unit/mL Inpn pen   Commonly known as:  HUMALOG KWIKPEN   Quantity:  1 Box   Refills:  11    Instructions:  10 units subcutaneously twice times a day 10-15 min before meals     Begin Date    AM    Noon    PM    Bedtime       insulin syringe-needle U-100 1 mL 31 gauge x 5/16 Syrg   Refills:  0      Begin Date    AM    Noon    PM  "   Bedtime       latanoprost 0.005 % ophthalmic solution   Quantity:  1 Bottle   Refills:  4   Dose:  1 drop    Instructions:  Place 1 drop into both eyes every evening.     Begin Date    AM    Noon    PM    Bedtime       lisinopril 5 MG tablet   Commonly known as:  PRINIVIL,ZESTRIL   Quantity:  90 tablet   Refills:  2   Dose:  5 mg    Last time this was given:  5 mg on 3/24/2017  8:34 AM   Instructions:  Take 1 tablet (5 mg total) by mouth once daily.     Begin Date    AM    Noon    PM    Bedtime       metformin 1000 MG tablet   Commonly known as:  GLUCOPHAGE   Quantity:  180 tablet   Refills:  3   Dose:  1000 mg   Indications:  type 2 diabetes mellitus    Instructions:  Take 1 tablet (1,000 mg total) by mouth 2 (two) times daily with meals.     Begin Date    AM    Noon    PM    Bedtime       metoprolol succinate 50 MG 24 hr tablet   Commonly known as:  TOPROL-XL   Quantity:  30 tablet   Refills:  5    Last time this was given:  50 mg on 3/24/2017  8:34 AM   Instructions:  TAKE 1 TABLET EVERY DAY     Begin Date    AM    Noon    PM    Bedtime       nitroGLYCERIN 0.3 MG SL tablet   Commonly known as:  NITROSTAT   Quantity:  30 tablet   Refills:  1   Dose:  0.3 mg    Instructions:  Place 1 tablet (0.3 mg total) under the tongue every 5 (five) minutes as needed for Chest pain. No more than 3 tablets in one day.     Begin Date    AM    Noon    PM    Bedtime       pantoprazole 40 MG tablet   Commonly known as:  PROTONIX   Quantity:  30 tablet   Refills:  11   Dose:  40 mg    Last time this was given:  40 mg on 3/24/2017  8:34 AM   Instructions:  Take 1 tablet (40 mg total) by mouth once daily.     Begin Date    AM    Noon    PM    Bedtime       pen needle, diabetic 31 gauge x 5/16" Ndle   Quantity:  100 each   Refills:  3   Dose:  1 pen    Instructions:  Inject 1 pen into the skin 2 (two) times daily. Use one 2 times a day     Begin Date    AM    Noon    PM    Bedtime       sertraline 50 MG tablet   Commonly known as:  " ZOLOFT   Quantity:  90 tablet   Refills:  3   Dose:  50 mg    Last time this was given:  50 mg on 3/24/2017  8:34 AM   Instructions:  Take 1 tablet (50 mg total) by mouth once daily.     Begin Date    AM    Noon    PM    Bedtime       tiotropium 18 mcg inhalation capsule   Commonly known as:  SPIRIVA WITH HANDIHALER   Quantity:  90 capsule   Refills:  3   Dose:  18 mcg    Instructions:  Inhale 1 capsule (18 mcg total) into the lungs once daily. Controller     Begin Date    AM    Noon    PM    Bedtime                  Please bring to all follow up appointments:    1. A copy of your discharge instructions.  2. All medicines you are currently taking in their original bottles.  3. Identification and insurance card.    Please arrive 15 minutes ahead of scheduled appointment time.    Please call 24 hours in advance if you must reschedule your appointment and/or time.        Your Scheduled Appointments     Mar 28, 2017  2:00 PM CDT   Post OP with Briana Blackburn PA-C   [facility] ([facility])    [address]  [place] [id number] [phone]            Mar 28, 2017  3:20 PM CDT   Established Patient Visit with Eri Armstrong NP   [facility] ([facility])    [address]  [place] [id number] [phone]            Apr 17, 2017  9:20 AM CDT   Fasting Lab with [facility], [facility] ([facility])    [address]  [place] [address] [phone]            Apr 17, 2017  9:40 AM CDT   Urine with [facility], [facility]    [address]  [place] [address] [phone]            Apr 17, 2017 10:20 AM CDT   Established Patient Visit with Mateo Lambert DO   [facility] ([address] [place] [address] [phone]              Follow-up Information     Follow up with Eri Armstrong NP In 3 days.    Specialties:  Pulmonary Disease, Internal Medicine    Why:   shortness of breath, established patient    Contact information:    [address]  [place] [address]  [phone]          Follow up with Briana Blackburn PA-C On 3/28/2017.    Specialty:  General Surgery    Why:  Post-op    Contact information:    [address] [place] [address]  [phone]          [facility] Call today.    Specialty:  Home Health Services    Why:  Please call your Home Health agency to notify of your arrival home.  Thank you    Contact information:    [phone]          Discharge Instructions     Future Orders    Diet general     Questions:    Total calories:      Fat restriction, if any:      Protein restriction, if any:      Na restriction, if any:      Fluid restriction:      Additional restrictions:      Lifting restrictions     Comments:    No lifting over 20 pounds        Discharge Instructions       [facility]  Instructions for Patients and Families      If your incisions have:  · Glue:  You may take a bath or shower immediately and wash your skin as you normally do.  The glue will eventually crumble or peel off. Do not let your incisions soak under water.  · Strips: Leave them on, but it is OK if they fall off on their own. It is OK to get them wet 48 hours after surgery.  · Bandage: You may remove it 2 days after your surgery, and then you may leave the incision open and take a shower or bath, unless otherwise instructed. If your bandage has clear plastic, you may shower with it on and then remove it 2 days after surgery.    Activities  · Walking is recommended after surgery; bed rest is not recommended unless specifically ordered.  · If you have had abdominal surgery, do not lift over 20 pounds for 4 weeks after surgery.  · If you have had hernia surgery, do not lift over 20 pounds for 6 weeks after surgery.  · You may drive when you are off your post-operative pain medication.  · Do not smoke after  surgery, it decreases your ability to heal and increases the risk of infection and pneumonia.    Diet:  Drink lots of fluids after surgery.  You might not have much of an appetite at first, you may eat regular food when you feel ready, unless you are given special diet instructions.    Post-operative symptoms and medications  · It is safe to take over-the-counter medications for constipation, heartburn, sleep, or itching if needed.  Prescription pain medication may contain acetaminophen (Tylenol), so you should not take additional acetaminophen (Tylenol) at the same time as your pain medication.  · You may experience nausea, low fever/chills, and clear drainage from your incision, sometimes up to a month after surgery.  Notify our office if you have fever over 101 degrees, worsening redness around your incision, thick cloudy drainage, or inability to drink any liquids.  · You will experience some level of pain after surgery.  Your pain medication should help with the pain, but may not be able to eliminate it entirely.  Pain will decrease with time, and most pain will be gone by 4 to 6 weeks after surgery.  · We are not able to call in prescriptions for pain medication after hours or on weekends.  If your pain medication is ineffective or you will run out soon and need a refill, please call our office at [phone].  We are not able to replace pain medication that has been lost or stolen.    After surgery, you will either be discharged home or admitted to the hospital.  If you are admitted to the hospital, one of the surgeons or a physician assistant will see you once a day.  Due to scheduled surgery, we may see you in the afternoon or at night; however, your nurse is able to page us at any time.  If you feel there is a situation that is not being addressed properly, please dial [phone] from the phone in your room.    Follow-up appointment  · You will see your surgeon or a physician assistant in clinic for a follow-up  "appointment at either our [facility] (off [address]) or [address] (off '[place]) locations, usually between one and four weeks after your surgery.  · The hospital nurses can make your follow up appointment, or you can make it online at [email or web address] or call [phone].  · If you have a smartphone with the [employer] brittnee, please let us know if you would like to do a phone visit instead of a post-op office visit.    If you are signed up for [facility], install the "[employer]" brittnee to access your test results, send messages to your doctors, and schedule appointments from your smartphone!       Primary Diagnosis     Your primary diagnosis was:  Abdominal Pain, Right Upper Quadrant      Admission Information     Date & Time Provider Department CSN    3/21/2017  9:23 PM Torin Bishop MD [facility] [id number]      Care Providers     Provider Role Specialty Primary office phone    Torin Bishop MD Attending Provider General Surgery [phone]    Louis O. Jeansonne IV, MD Consulting Physician  General Surgery  [phone]      Your Vitals Were     BP Pulse Temp Resp Height Weight    134/74 (BP Location: Right arm, Patient Position: Sitting, BP Method: Automatic) 75 98.1 °F (36.7 °C) (Oral) 18 5' 5" (1.651 m) 92.1 kg (203 lb)    SpO2 BMI             96% 33.78 kg/m2         Recent Lab Values        12/4/2014 11/15/2016 11/16/2016 12/7/2016 3/16/2017              10:29 AM  9:35 PM  5:19 AM 12:05 PM 12:21 AM       A1C 10.6 (H) 13.0 (H) 12.9 (H) 11.7 (H) 8.8 (H)       Comment for A1C at  9:35 PM on 11/15/2016:  According to ADA guidelines, hemoglobin A1C <7.0% represents  optimal control in non-pregnant diabetic patients.  Different  metrics may apply to specific populations.   Standards of Medical Care in Diabetes - 2016.  For the purpose of screening for the presence of diabetes:  <5.7%     Consistent with the absence of diabetes  5.7-6.4%  Consistent with increasing risk for diabetes "   (prediabetes)  >or=6.5%  Consistent with diabetes  Currently no consensus exists for use of hemoglobin A1C  for diagnosis of diabetes for children.      Comment for A1C at  5:19 AM on 11/16/2016:  According to ADA guidelines, hemoglobin A1C <7.0% represents  optimal control in non-pregnant diabetic patients.  Different  metrics may apply to specific populations.   Standards of Medical Care in Diabetes - 2016.  For the purpose of screening for the presence of diabetes:  <5.7%     Consistent with the absence of diabetes  5.7-6.4%  Consistent with increasing risk for diabetes   (prediabetes)  >or=6.5%  Consistent with diabetes  Currently no consensus exists for use of hemoglobin A1C  for diagnosis of diabetes for children.      Comment for A1C at 12:05 PM on 12/7/2016:  According to ADA guidelines, hemoglobin A1C <7.0% represents  optimal control in non-pregnant diabetic patients.  Different  metrics may apply to specific populations.   Standards of Medical Care in Diabetes - 2016.  For the purpose of screening for the presence of diabetes:  <5.7%     Consistent with the absence of diabetes  5.7-6.4%  Consistent with increasing risk for diabetes   (prediabetes)  >or=6.5%  Consistent with diabetes  Currently no consensus exists for use of hemoglobin A1C  for diagnosis of diabetes for children.      Comment for A1C at 12:21 AM on 3/16/2017:  According to ADA guidelines, hemoglobin A1C <7.0% represents  optimal control in non-pregnant diabetic patients.  Different  metrics may apply to specific populations.   Standards of Medical Care in Diabetes - 2016.  For the purpose of screening for the presence of diabetes:  <5.7%     Consistent with the absence of diabetes  5.7-6.4%  Consistent with increasing risk for diabetes   (prediabetes)  >or=6.5%  Consistent with diabetes  Currently no consensus exists for use of hemoglobin A1C  for diagnosis of diabetes for children.        Allergies as of 3/24/2017     No Known Allergies       Advance Directives     An advance directive is a document which, in the event you are no longer able to make decisions for yourself, tells your healthcare team what kind of treatment you do or do not want to receive, or who you would like to make those decisions for you.  If you do not currently have an advance directive, [facility] encourages you to create one.  For more information call:  [phone]), [phone] ([phone]),  or log on to [email or web address].        Language Assistance Services     ATTENTION: Language assistance services are available, free of charge. Please call [phone].      ATENCIÓN: Si hablisa victoria, tiene a garcia disposición servicios gratuitos de asistencia lingüística. Llame al [phone].     CHÚ Ý: N?u b?n nói Ti?ng Vi?t, có các d?ch v? h? tr? ngôn ng? mi?n phí dành cho b?n. G?i s? [phone].        Heart Failure Education       Heart Failure: Being Active  You have a condition called heart failure. Being active doesnt mean that you have to wear yourself out. Even a little movement each day helps to strengthen your heart. If you cant get out to exercise, you can do simple stretching and strengthening exercises at home. These are good ways to keep you well-conditioned and prevent you and your heart from becoming excessively weak.    Ideas to get you started  · Add a little movement to things you do now. Walk to mail letters. Park your car at the far end of the parking lot and walk to the store. Walk up a flight of stairs instead of taking the elevator.  · Choose activities you enjoy. You might walk, swim, or ride an exercise bike. Things like gardening and washing the car count, too. Other possibilities include: washing dishes, walking the dog, walking around the mall, and doing aerobic activities with friends.  · Join a group exercise program at a [facility] or [facility], a senior center, or a community center. Or look into a hospital cardiac rehabilitation  program. Ask your doctor if you qualify.  Tips to keep you going  · Get up and get dressed each day. Go to a coffee shop and read a newspaper or go somewhere that you'll be in the presence of other active people. Youll feel more like being active.  · Make a plan. Choose one or more activities that you enjoy and that you can easily do. Then plan to do at least one each day. You might write your plan on a calendar.  · Go with a friend or a group if you like company. This can help you feel supported and stay motivated, too.  · Plan social events that you enjoy. This will keep you mentally engaged as well as physically motivated to do things you find pleasure in.  For your safety  · Talk with your healthcare provider before starting an exercise program.  · Exercise indoors when its too hot or too cold outside, or when the air quality is poor. Try walking at a shopping mall.  · Wear socks and sturdy shoes to maintain your balance and prevent falls.  · Start slowly. Do a few minutes several times a day at first. Increase your time and speed little by little.  · Stop and rest whenever you feel tired or get short of breath.  · Dont push yourself on days when you dont feel well.  Date Last Reviewed: 3/20/2016  © 4184-1174 [employer]. [address] [place] [address], [place] [address]. All rights reserved. This information is not intended as a substitute for professional medical care. Always follow your healthcare professional's instructions.              Heart Failure: Evaluating Your Heart  You have a condition called heart failure. To evaluate your condition, your doctor will examine you, ask questions, and do some tests. Along with looking for signs of heart failure, the doctor looks for any other health problems that may have led to heart failure. The results of your evaluation will help your doctor form a treatment plan.  Health history and physical exam  Your visit will start with a health history. Tell the  doctor about any symptoms youve noticed and about all medicines you take. Then youll have a physical exam. This includes listening to your heartbeat and breathing. Youll also be checked for swelling (edema) in your legs and neck. When you have fluid buildup or fluid in the lungs, it may be called congestive heart failure.  Diagnosing heart failure     During an echocardiogram, sound waves bounce off the heart. These are converted into a picture on the screen.   The following may be done to help your doctor form a diagnosis:  · X-rays show the size and shape of your heart. These pictures can also show fluid in your lungs.  · An electrocardiogram (ECG or EKG) shows the pattern of your heartbeat. Small pads (electrodes) are placed on your chest, arms, and legs. Wires connect the pads to the ECG machine, which records your hearts electrical signals. This can give the doctor information about heart function.  · An echocardiogram uses ultrasound waves to show the structure and movement of your heart muscle. This shows how well the heart pumps. It also shows the thickness of the heart walls, and if the heart is enlarged. It is one of the most useful, non-invasive tests as it provides information about the heart's general function. This helps your doctor make treatment decisions.  · Lab tests evaluate small amounts of blood or urine for signs of problems. A BNP lab test can help diagnose and evaluate heart failure. BNP stands for B-type natriuretic peptide. The ventricles secrete more BNP when heart failure worsens. Lab tests can also provide information about metabolic dysfunction or heart dysfunction.  Your treatment plan  Based on the results of your evaluation and tests, your doctor will develop a treatment plan. This plan is designed to relieve some of your heart failure symptoms and help make you more comfortable. Your treatment plan may include:  · Medicine to help your heart work better and improve your quality  of life  · Changes in what you eat and drink to help prevent fluid from backing up in your body  · Daily monitoring of your weight and heart failure symptoms to see how well your treatment plan is working  · Exercise to help you stay healthy  · Help with quitting smoking  · Emotional and psychological support to help adjust to the changes  · Referrals to other specialists to make sure you are being treated comprehensively  Date Last Reviewed: 3/21/2016  © [id number] [employer]. [address] [place] [address], [place] [address]. All rights reserved. This information is not intended as a substitute for professional medical care. Always follow your healthcare professional's instructions.              Heart Failure: Making Changes to Your Diet  You have a condition called heart failure. When you have heart failure, excess fluid is more likely to build up in your body because your heart isn't working well. This makes the heart work harder to pump blood. Fluid buildup causes symptoms such as shortness of breath and swelling (edema). This is often referred to as congestive heart failure or CHF. Controlling the amount of salt (sodium) you eat may help stop fluid from building up. Your doctor may also tell you to reduce the amount of fluid you drink.  Reading food labels    Your healthcare provider will tell you how much sodium you can eat each day. Read food labels to keep track. Keep in mind that certain foods are high in salt. These include canned, frozen, and processed foods. Check the amount of sodium in each serving. Watch out for high-sodium ingredients. These include MSG (monosodium glutamate), baking soda, and sodium phosphate.   Eating less salt  Give yourself time to get used to eating less salt. It may take a little while. Here are some tips to help:  · Take the saltshaker off the table. Replace it with salt-free herb mixes and spices.  · Eat fresh or plain frozen vegetables. These have much less salt than  canned vegetables.  · Choose low-sodium snacks like sodium-free pretzels, crackers, or air-popped popcorn.  · Dont add salt to your food when youre cooking. Instead, season your foods with pepper, lemon, garlic, or onion.  · When you eat out, ask that your food be cooked without added salt.  · Avoid eating fried foods as these often have a great deal of salt.  If youre told to limit fluids  You may need to limit how much fluid you have to help prevent swelling. This includes anything that is liquid at room temperature, such as ice cream and soup. If your doctor tells you to limit fluid, try these tips:  · Measure drinks in a measuring cup before you drink them. This will help you meet daily goals.  · Chill drinks to make them more refreshing.  · Suck on frozen lemon wedges to quench thirst.  · Only drink when youre thirsty.  · Chew sugarless gum or suck on hard candy to keep your mouth moist.  · Weigh yourself daily to know if your body's fluid content is rising.  My sodium goal  Your healthcare provider may give you a sodium goal to meet each day. This includes sodium found in food as well as salt that you add. My goal is to eat no more than ___________ mg of sodium per day.     When to call your doctor  Call your doctor right away if you have any symptoms of worsening heart failure. These can include:  · Sudden weight gain  · Increased swelling of your legs or ankles  · Trouble breathing when youre resting or at night  · Increase in the number of pillows you have to sleep on  · Chest pain, pressure, discomfort, or pain in the jaw, neck, or back   Date Last Reviewed: 3/21/2016  © 7147-3543 [employer]. [address] [place] [address], [place] [address]. All rights reserved. This information is not intended as a substitute for professional medical care. Always follow your healthcare professional's instructions.              Heart Failure: Medicines to Help Your Heart    You have a condition called heart  failure (also known as congestive heart failure, or CHF). Your doctor will likely prescribe medicines for heart failure and any underlying health problems you have. Most heart failure patients take one or more types of medicinen. Your healthcare provider will work to find the combination of medicines that works best for you.  Heart failure medicines  Here are the most common heart failure medicines:  · ACE inhibitors lower blood pressure and decrease strain on the heart. This makes it easier for the heart to pump. Angiotensin receptor blockers have similar effects. These are prescribed for some patients instead of ACE inhibitors.  · Beta-blockers relieve stress on the heart. They also improve symptoms. They may also improve the heart's pumping action over time.  · Diuretics (also called water pills) help rid your body of excess water. This can help rid your body of swelling (edema). Having less fluid to pump means your heart doesnt have to work as hard. Some diuretics make your body lose a mineral called potassium. Your doctor will tell you if you need to take supplements or eat more foods high in potassium.  · Digoxin helps your heart pump with more strength. This helps your heart pump more blood with each beat. So, more oxygen-rich blood travels to the rest of the body.  · Aldosterone antagonists help alter hormones and decrease strain on the heart.  · Hydralazine and nitrates are two separate medicines used together to treat heart failure. They may come in one combination pill. They lower blood pressure and decrease how hard the heart has to pump.  Medicines for related conditions  Controlling other heart problems helps keep heart failure under control, too. Depending on other heart problems you have, medicines may be prescribed to:  · Lower blood pressure (antihypertensives).  · Lower cholesterol levels (statins).  · Prevent blood clots (anticoagulants or aspirin).  · Keep the heartbeat steady  (antiarrhythmics).  Date Last Reviewed: 3/5/2016  © [id number] [employer]. [address] [place] [address], [place] [address]. All rights reserved. This information is not intended as a substitute for professional medical care. Always follow your healthcare professional's instructions.              Heart Failure: Procedures That May Help    The heart is a muscle that pumps oxygen-rich blood to all parts of the body. When you have heart failure, the heart is not able to pump as well as it should. Blood and fluid may back up into the lungs (congestive heart failure), and some parts of the body dont get enough oxygen-rich blood to work normally. These problems lead to the symptoms of heart failure.     Certain procedures may help the heart pump better in some cases of heart failure. Some procedures are done to treat health problems that may have caused the heart failure such as coronary artery disease or heart rhythm problems. For more serious heart failure, other options are available.  Treating artery and valve problems  If you have coronary artery disease or valve disease, procedures may be done to improve blood flow. This helps the heart pump better, which can improve heart failure symptoms. First, your doctor may do a cardiac catheterization to help detect clogged blood vessels or valve damage. During this procedure, a  thin tube (catheter) in inserted into a blood vessel and guided to the heart. There a dye is injected and a special type of X-ray (angiogram) is taken of the blood vessels. Procedures to open a blocked artery or fix damaged valves can also be done using catheterization.  · Angioplasty uses a balloon-tipped instrument at the end of the catheter. The balloon is inflated to widen the narrowed artery. In many cases, a stent is expanded to further support the narrowed artery. A stent is a metal mesh tube.  · Valve surgery repairs or replacement of faulty valves can also be done during  catheterization so blood can flow properly through the chambers of the heart.  Bypass surgery is another option to help treat blocked arteries. It uses a healthy blood vessel from elsewhere in the body. The healthy blood vessel is attached above and below the blocked area so that blood can flow around the blocked artery.  Treating heart rhythm problems  A device may be placed in the chest to help a weak heart maintain a healthy, heartbeat so the heart can pump more effectively:  · Pacemaker. A pacemaker is an implanted device that regulates your heartbeat electronically. It monitors your heart's rhythm and generates a painless electric impulse that helps the heart beat in a regular rhythm. A pacemaker is programmed to meet your specific heart rhythm needs.  · Biventricular pacing/cardiac resynchronization therapy. A type of pacemaker that paces both pumping chambers of the heart at the same time to coordinate contractions and to improve the heart's function. Some people with heart failure are candidates for this therapy.  · Implantable cardioverter defibrillator. A device similar to a pacemaker that senses when the heart is beating too fast and delivers an electrical shock to convert the fast rhythm to a normal rhythm. This can be a life saving device.  In severe cases  In more serious cases of heart failure when other treatments no longer work, other options may include:  · Ventricular assist devices (VADs). These are mechanical devices used to take over the pumping function for one or both of the heart's ventricles, or pumping chambers. A VAD may be necessary when heart failure progresses to the point that medicines and other treatments no longer help. In some cases, a VAD may be used as a bridge to transplant.  · Heart transplant. This is replacing the diseased heart with a healthy one from a donor. This is an option for a few people who are very sick. A heart transplant is very serious and not an option for all  patients. Your doctor can tell you more.  Date Last Reviewed: 3/20/2016  © 8895-9901 [employer]. [address] [place] [address], [place] [address]. All rights reserved. This information is not intended as a substitute for professional medical care. Always follow your healthcare professional's instructions.              Heart Failure: Tracking Your Weight  You have a condition called heart failure. When you have heart failure, a sudden weight gain or a steady rise in weight is a warning sign that your body is retaining too much water and salt. This could mean your heart failure is getting worse. If left untreated, it can cause problems for your lungs and result in shortness of breath. Weighing yourself each day is the best way to know if youre retaining water. If your weight goes up quickly, call your doctor. You will be given instructions on how to get rid of the excess water. You will likely need medicines and to avoid salt. This will help your heart work better.  Call your doctor if you gain more than 2 pounds in 1 day, more than 5 pounds in 1 week, or whatever weight gain you were told to report by your doctor. This is often a sign of worsening heart failure and needs to be evaluated and treated. Your doctor will tell you what to do next.   Tips for weighing yourself    · Weigh yourself at the same time each morning, wearing the same clothes. Weigh yourself after urinating and before eating.  · Use the same scale each day. Make sure the numbers are easy to read. Put the scale on a flat, hard surface -- not on a rug or carpet.  · Do not stop weighing yourself. If you forget one day, weigh again the next morning.  How to use your weight chart  · Keep your weight chart near the scale. Write your weight on the chart as soon as you get off the scale.  · Fill in the month and the start date on the chart. Then write down your weight each day. Your chart will look like this:    · If you miss a day, leave the  space blank. Weigh yourself the next day and write your weight in the next space.  · Take your weight chart with you when you go to see your doctor.  Date Last Reviewed: 3/20/2016  © [id number] [employer]. [address] [place] [address], [place] [address]. All rights reserved. This information is not intended as a substitute for professional medical care. Always follow your healthcare professional's instructions.              Heart Failure: Warning Signs of a Flare-Up  You have a condition called heart failure. Once you have heart failure, flare-ups can happen. Below are signs that can mean your heart failure is getting worse. If you notice any of these warning signs, call your healthcare provider.  Swelling    · Your feet, ankles, or lower legs get puffier.  · You notice skin changes on your lower legs.  · Your shoes feel too tight.  · Your clothes are tighter in the waist.  · You have trouble getting rings on or off your fingers.  Shortness of breath  · You have to breathe harder even when youre doing your normal activities or when youre resting.  · You are short of breath walking up stairs or even short distances.  · You wake up at night short of breath or coughing.  · You need to use more pillows or sit up to sleep.  · You wake up tired or restless.  Other warning signs  · You feel weaker, dizzy, or more tired.  · You have chest pain or changes in your heartbeat.  · You have a cough that wont go away.  · You cant remember things or dont feel like eating.  Tracking your weight  Gaining weight is often the first warning sign that heart failure is getting worse. Gaining even a few pounds can be a sign that your body is retaining excess water and salt. Weighing yourself each day in the morning after you urinate and before you eat, is the best way to know if you're retaining water. Get a scale that is easy to read and make sure you wear the same clothes and use the same scale every time you weigh. Your  healthcare provider will show you how to track your weight. Call your doctor if you gain more than 2 pounds in 1 day, 5 pounds in 1 week, or whatever weight gain you were told to report by your doctor. This is often a sign of worsening heart failure and needs to be evaluated and treated before it compromises your breathing. Your doctor will tell you what to do next.    Date Last Reviewed: 3/15/2016  © [id number] [employer]. [address] [place] [address], [place] [address]. All rights reserved. This information is not intended as a substitute for professional medical care. Always follow your healthcare professional's instructions.              Diabetes Discharge Instructions                                   [facility] complies with applicable Federal civil rights laws and does not discriminate on the basis of race, color, national origin, age, disability, or sex.

## 2017-03-22 PROBLEM — R10.11 RUQ ABDOMINAL PAIN: Status: ACTIVE | Noted: 2017-03-22

## 2017-03-22 LAB
ALBUMIN SERPL BCP-MCNC: 2.7 G/DL
ALP SERPL-CCNC: 65 U/L
ALT SERPL W/O P-5'-P-CCNC: 30 U/L
ANION GAP SERPL CALC-SCNC: 11 MMOL/L
AST SERPL-CCNC: 29 U/L
BASOPHILS # BLD AUTO: 0.01 K/UL
BASOPHILS NFR BLD: 0.1 %
BILIRUB SERPL-MCNC: 0.5 MG/DL
BILIRUB UR QL STRIP: NEGATIVE
BUN SERPL-MCNC: 9 MG/DL
CALCIUM SERPL-MCNC: 8.4 MG/DL
CHLORIDE SERPL-SCNC: 103 MMOL/L
CLARITY UR: CLEAR
CO2 SERPL-SCNC: 21 MMOL/L
COLOR UR: YELLOW
CREAT SERPL-MCNC: 0.8 MG/DL
DIFFERENTIAL METHOD: ABNORMAL
EOSINOPHIL # BLD AUTO: 0.2 K/UL
EOSINOPHIL NFR BLD: 1.5 %
ERYTHROCYTE [DISTWIDTH] IN BLOOD BY AUTOMATED COUNT: 13.7 %
EST. GFR  (AFRICAN AMERICAN): >60 ML/MIN/1.73 M^2
EST. GFR  (NON AFRICAN AMERICAN): >60 ML/MIN/1.73 M^2
GLUCOSE SERPL-MCNC: 181 MG/DL
GLUCOSE UR QL STRIP: ABNORMAL
HCT VFR BLD AUTO: 32.6 %
HGB BLD-MCNC: 10.7 G/DL
HGB UR QL STRIP: NEGATIVE
KETONES UR QL STRIP: NEGATIVE
LEUKOCYTE ESTERASE UR QL STRIP: NEGATIVE
LYMPHOCYTES # BLD AUTO: 2.7 K/UL
LYMPHOCYTES NFR BLD: 23.6 %
MCH RBC QN AUTO: 28.4 PG
MCHC RBC AUTO-ENTMCNC: 32.8 %
MCV RBC AUTO: 87 FL
MONOCYTES # BLD AUTO: 1 K/UL
MONOCYTES NFR BLD: 8.5 %
NEUTROPHILS # BLD AUTO: 7.5 K/UL
NEUTROPHILS NFR BLD: 66.3 %
NITRITE UR QL STRIP: NEGATIVE
PH UR STRIP: 6 [PH] (ref 5–8)
PLATELET # BLD AUTO: 184 K/UL
PMV BLD AUTO: 11.5 FL
POTASSIUM SERPL-SCNC: 4.1 MMOL/L
PROT SERPL-MCNC: 7.1 G/DL
PROT UR QL STRIP: NEGATIVE
RBC # BLD AUTO: 3.77 M/UL
SODIUM SERPL-SCNC: 135 MMOL/L
SP GR UR STRIP: 1.02 (ref 1–1.03)
URN SPEC COLLECT METH UR: ABNORMAL
UROBILINOGEN UR STRIP-ACNC: ABNORMAL EU/DL
WBC # BLD AUTO: 11.27 K/UL

## 2017-03-22 PROCEDURE — 25000003 PHARM REV CODE 250: Performed by: EMERGENCY MEDICINE

## 2017-03-22 PROCEDURE — 85025 COMPLETE CBC W/AUTO DIFF WBC: CPT

## 2017-03-22 PROCEDURE — 81003 URINALYSIS AUTO W/O SCOPE: CPT

## 2017-03-22 PROCEDURE — G0378 HOSPITAL OBSERVATION PER HR: HCPCS

## 2017-03-22 PROCEDURE — 25500020 PHARM REV CODE 255: Performed by: EMERGENCY MEDICINE

## 2017-03-22 PROCEDURE — 80053 COMPREHEN METABOLIC PANEL: CPT

## 2017-03-22 PROCEDURE — 63600175 PHARM REV CODE 636 W HCPCS: Performed by: EMERGENCY MEDICINE

## 2017-03-22 PROCEDURE — 99024 POSTOP FOLLOW-UP VISIT: CPT | Mod: ,,, | Performed by: PHYSICIAN ASSISTANT

## 2017-03-22 RX ORDER — SODIUM CHLORIDE 9 MG/ML
INJECTION, SOLUTION INTRAVENOUS CONTINUOUS
Status: DISCONTINUED | OUTPATIENT
Start: 2017-03-22 | End: 2017-03-23

## 2017-03-22 RX ORDER — HYDROMORPHONE HYDROCHLORIDE 2 MG/ML
1 INJECTION, SOLUTION INTRAMUSCULAR; INTRAVENOUS; SUBCUTANEOUS EVERY 4 HOURS PRN
Status: DISCONTINUED | OUTPATIENT
Start: 2017-03-22 | End: 2017-03-24

## 2017-03-22 RX ORDER — ONDANSETRON 2 MG/ML
4 INJECTION INTRAMUSCULAR; INTRAVENOUS EVERY 12 HOURS PRN
Status: DISCONTINUED | OUTPATIENT
Start: 2017-03-22 | End: 2017-03-24 | Stop reason: HOSPADM

## 2017-03-22 RX ORDER — HYDROMORPHONE HYDROCHLORIDE 2 MG/ML
0.5 INJECTION, SOLUTION INTRAMUSCULAR; INTRAVENOUS; SUBCUTANEOUS EVERY 4 HOURS PRN
Status: DISCONTINUED | OUTPATIENT
Start: 2017-03-22 | End: 2017-03-24

## 2017-03-22 RX ADMIN — IOHEXOL 75 ML: 350 INJECTION, SOLUTION INTRAVENOUS at 12:03

## 2017-03-22 RX ADMIN — HYDROMORPHONE HYDROCHLORIDE 1 MG: 2 INJECTION, SOLUTION INTRAMUSCULAR; INTRAVENOUS; SUBCUTANEOUS at 12:03

## 2017-03-22 RX ADMIN — HYDROMORPHONE HYDROCHLORIDE 1 MG: 2 INJECTION, SOLUTION INTRAMUSCULAR; INTRAVENOUS; SUBCUTANEOUS at 08:03

## 2017-03-22 RX ADMIN — ONDANSETRON 4 MG: 2 INJECTION INTRAMUSCULAR; INTRAVENOUS at 07:03

## 2017-03-22 RX ADMIN — HYDROMORPHONE HYDROCHLORIDE 0.5 MG: 2 INJECTION, SOLUTION INTRAMUSCULAR; INTRAVENOUS; SUBCUTANEOUS at 07:03

## 2017-03-22 RX ADMIN — SODIUM CHLORIDE: 0.9 INJECTION, SOLUTION INTRAVENOUS at 02:03

## 2017-03-22 NOTE — ED PROVIDER NOTES
SCRIBE #1 NOTE: I, Dennis Whitaker, am scribing for, and in the presence of, Anibal Crespo MD. I have scribed the entire note.      History      Chief Complaint   Patient presents with    Abdominal Pain     recent gallbaladder removal       Review of patient's allergies indicates:  No Known Allergies     HPI   HPI    3/21/2017, 10:00 PM   History obtained from the patient      History of Present Illness: Crow Green is a 61 y.o. male patient who presents to the Emergency Department for RUQ abd pain which onset gradually today. Symptoms are constant and moderate in severity. Sx are exacerbated by nothing and relieved by nothing. Pt reports cholecystectomy done 3 days ago. Pt also reports SOB which has been chronic for 5 years. No other sxs reported. Patient denies any fever, N/V/D, chills, constipation, dysuria, difficulty urinating, weakness/numbness, CP, palpitations, cough, congestion, chest tightness, lightheadedness, dizziness and all other sxs at this time. No further complaints or concerns at this time.     Arrival mode: EMS    PCP: Mateo Lambert DO       Past Medical History:  Past Medical History:   Diagnosis Date    Arthritis     Asthma     Depression     Diabetes mellitus     Diabetes mellitus, type 2 2000    Elevated PSA     Hypertension        Past Surgical History:  History reviewed. No pertinent surgical history.      Family History:  Family History   Problem Relation Age of Onset    Glaucoma Mother     Cataracts Mother     Cataracts Father     Glaucoma Sister     Cataracts Sister     Glaucoma Maternal Aunt     Prostate cancer Neg Hx        Social History:  Social History     Social History Main Topics    Smoking status: Never Smoker    Smokeless tobacco: Unknown    Alcohol use No    Drug use: No    Sexual activity: Not Currently       ROS   Review of Systems   Constitutional: Negative for chills and fever.   HENT: Negative for congestion and sore throat.     Respiratory: Positive for shortness of breath (chronic). Negative for chest tightness.    Cardiovascular: Negative for chest pain.   Gastrointestinal: Positive for abdominal pain (Ruq). Negative for nausea and vomiting.   Genitourinary: Negative for difficulty urinating and dysuria.   Musculoskeletal: Negative for back pain and neck pain.   Skin: Negative for rash.   Neurological: Negative for dizziness, weakness, light-headedness, numbness and headaches.   Psychiatric/Behavioral: Negative for agitation and confusion.   All other systems reviewed and are negative.      Physical Exam    Initial Vitals   BP Pulse Resp Temp SpO2   03/21/17 2124 03/21/17 2124 03/21/17 2124 03/21/17 2124 03/21/17 2124   112/70 93 18 98.6 °F (37 °C) 95 %      Physical Exam  Nursing Notes and Vital Signs Reviewed.  Constitutional: Patient is in no apparent distress. Awake and alert. Well-developed and well-nourished.  Head: Atraumatic. Normocephalic.  Eyes: PERRL. EOM intact. Conjunctivae are not pale. No scleral icterus.  ENT: Mucous membranes are moist. Oropharynx is clear and symmetric.    Neck: Supple. Full ROM. No lymphadenopathy.  Cardiovascular: Regular rate. Regular rhythm. No murmurs, rubs, or gallops. Distal pulses are 2+ and symmetric.  Pulmonary/Chest: No respiratory distress. Clear to auscultation bilaterally. No wheezing, rales, or rhonchi.  Abdominal: Soft and non-distended.  There is RUQ tenderness.  No rebound, guarding, or rigidity. Good bowel sounds.  Musculoskeletal: Moves all extremities. No obvious deformities. No edema. No calf tenderness.  Skin: Warm and dry.  Neurological:  Alert, awake, and appropriate.  Normal speech.  No acute focal neurological deficits are appreciated.  Psychiatric: Normal affect. Good eye contact. Appropriate in content.    ED Course    Procedures  ED Vital Signs:  Vitals:    03/22/17 1901 03/23/17 0027 03/23/17 0451 03/23/17 0738   BP: 137/63 137/65 (!) 147/71 133/63   Pulse: 84 93 78 92    Resp: 18 20 17 18   Temp: 98.1 °F (36.7 °C) 97.5 °F (36.4 °C) 98.2 °F (36.8 °C) 98.3 °F (36.8 °C)   TempSrc: Oral Oral Oral Oral   SpO2: 100% 95% (!) 94% (!) 94%   Weight:       Height:        03/23/17 0805 03/23/17 1200 03/23/17 1215 03/23/17 1620   BP:   118/74 116/77   Pulse: 88 68 87 68   Resp: 18 20 18   Temp:   97.5 °F (36.4 °C) 98.3 °F (36.8 °C)   TempSrc:   Oral Oral   SpO2: 96%  98% 95%   Weight:       Height:        03/23/17 1909 03/23/17 1911 03/23/17 2035 03/23/17 2321   BP: (!) 142/76   129/75   Pulse: 81  82 97   Resp: 18 18 18   Temp: 98 °F (36.7 °C)   98.5 °F (36.9 °C)   TempSrc: Oral   Oral   SpO2: (!) 89% 95% (!) 93% 96%   Weight:       Height:        03/24/17 0426 03/24/17 0724 03/24/17 0828   BP: 113/61  (!) 147/68   Pulse: 87 89 90   Resp: 18 16 18   Temp: 98.4 °F (36.9 °C)  97.8 °F (36.6 °C)   TempSrc: Oral  Axillary   SpO2: (!) 93% 95% (!) 94%   Weight:      Height:          Abnormal Lab Results:  Labs Reviewed   CBC W/ AUTO DIFFERENTIAL - Abnormal; Notable for the following:        Result Value    RBC 3.73 (*)     Hemoglobin 10.7 (*)     Hematocrit 32.1 (*)     All other components within normal limits   COMPREHENSIVE METABOLIC PANEL - Abnormal; Notable for the following:     Sodium 134 (*)     Glucose 295 (*)     Albumin 2.8 (*)     All other components within normal limits   URINALYSIS - Abnormal; Notable for the following:     Glucose, UA 2+ (*)     Urobilinogen, UA 4.0-6.0 (*)     All other components within normal limits   CBC W/ AUTO DIFFERENTIAL - Abnormal; Notable for the following:     RBC 3.77 (*)     Hemoglobin 10.7 (*)     Hematocrit 32.6 (*)     All other components within normal limits   COMPREHENSIVE METABOLIC PANEL - Abnormal; Notable for the following:     Sodium 135 (*)     CO2 21 (*)     Glucose 181 (*)     Calcium 8.4 (*)     Albumin 2.7 (*)     All other components within normal limits   LIPASE        All Lab Results:  Results for orders placed or performed during the  hospital encounter of 03/21/17   CBC W/ AUTO DIFFERENTIAL   Result Value Ref Range    WBC 10.21 3.90 - 12.70 K/uL    RBC 3.73 (L) 4.60 - 6.20 M/uL    Hemoglobin 10.7 (L) 14.0 - 18.0 g/dL    Hematocrit 32.1 (L) 40.0 - 54.0 %    MCV 86 82 - 98 fL    MCH 28.7 27.0 - 31.0 pg    MCHC 33.3 32.0 - 36.0 %    RDW 13.5 11.5 - 14.5 %    Platelets 192 150 - 350 K/uL    MPV 10.7 9.2 - 12.9 fL    Gran # 6.8 1.8 - 7.7 K/uL    Lymph # 2.3 1.0 - 4.8 K/uL    Mono # 0.9 0.3 - 1.0 K/uL    Eos # 0.2 0.0 - 0.5 K/uL    Baso # 0.01 0.00 - 0.20 K/uL    Gran% 66.8 38.0 - 73.0 %    Lymph% 22.4 18.0 - 48.0 %    Mono% 8.9 4.0 - 15.0 %    Eosinophil% 1.8 0.0 - 8.0 %    Basophil% 0.1 0.0 - 1.9 %    Differential Method Automated    Comp. Metabolic Panel   Result Value Ref Range    Sodium 134 (L) 136 - 145 mmol/L    Potassium 3.9 3.5 - 5.1 mmol/L    Chloride 101 95 - 110 mmol/L    CO2 23 23 - 29 mmol/L    Glucose 295 (H) 70 - 110 mg/dL    BUN, Bld 13 6 - 20 mg/dL    Creatinine 1.0 0.5 - 1.4 mg/dL    Calcium 9.0 8.7 - 10.5 mg/dL    Total Protein 7.0 6.0 - 8.4 g/dL    Albumin 2.8 (L) 3.5 - 5.2 g/dL    Total Bilirubin 0.5 0.1 - 1.0 mg/dL    Alkaline Phosphatase 62 55 - 135 U/L    AST 21 10 - 40 U/L    ALT 29 10 - 44 U/L    Anion Gap 10 8 - 16 mmol/L    eGFR if African American >60 >60 mL/min/1.73 m^2    eGFR if non African American >60 >60 mL/min/1.73 m^2   Lipase   Result Value Ref Range    Lipase 54 4 - 60 U/L   Urinalysis - Clean Catch   Result Value Ref Range    Specimen UA Urine, Clean Catch     Color, UA Yellow Yellow, Straw, Angela    Appearance, UA Clear Clear    pH, UA 6.0 5.0 - 8.0    Specific Gravity, UA 1.020 1.005 - 1.030    Protein, UA Negative Negative    Glucose, UA 2+ (A) Negative    Ketones, UA Negative Negative    Bilirubin (UA) Negative Negative    Occult Blood UA Negative Negative    Nitrite, UA Negative Negative    Urobilinogen, UA 4.0-6.0 (A) <2.0 EU/dL    Leukocytes, UA Negative Negative   CBC auto differential   Result Value  Ref Range    WBC 11.27 3.90 - 12.70 K/uL    RBC 3.77 (L) 4.60 - 6.20 M/uL    Hemoglobin 10.7 (L) 14.0 - 18.0 g/dL    Hematocrit 32.6 (L) 40.0 - 54.0 %    MCV 87 82 - 98 fL    MCH 28.4 27.0 - 31.0 pg    MCHC 32.8 32.0 - 36.0 %    RDW 13.7 11.5 - 14.5 %    Platelets 184 150 - 350 K/uL    MPV 11.5 9.2 - 12.9 fL    Gran # 7.5 1.8 - 7.7 K/uL    Lymph # 2.7 1.0 - 4.8 K/uL    Mono # 1.0 0.3 - 1.0 K/uL    Eos # 0.2 0.0 - 0.5 K/uL    Baso # 0.01 0.00 - 0.20 K/uL    Gran% 66.3 38.0 - 73.0 %    Lymph% 23.6 18.0 - 48.0 %    Mono% 8.5 4.0 - 15.0 %    Eosinophil% 1.5 0.0 - 8.0 %    Basophil% 0.1 0.0 - 1.9 %    Differential Method Automated    Comprehensive metabolic panel   Result Value Ref Range    Sodium 135 (L) 136 - 145 mmol/L    Potassium 4.1 3.5 - 5.1 mmol/L    Chloride 103 95 - 110 mmol/L    CO2 21 (L) 23 - 29 mmol/L    Glucose 181 (H) 70 - 110 mg/dL    BUN, Bld 9 6 - 20 mg/dL    Creatinine 0.8 0.5 - 1.4 mg/dL    Calcium 8.4 (L) 8.7 - 10.5 mg/dL    Total Protein 7.1 6.0 - 8.4 g/dL    Albumin 2.7 (L) 3.5 - 5.2 g/dL    Total Bilirubin 0.5 0.1 - 1.0 mg/dL    Alkaline Phosphatase 65 55 - 135 U/L    AST 29 10 - 40 U/L    ALT 30 10 - 44 U/L    Anion Gap 11 8 - 16 mmol/L    eGFR if African American >60 >60 mL/min/1.73 m^2    eGFR if non African American >60 >60 mL/min/1.73 m^2   CBC auto differential   Result Value Ref Range    WBC 10.50 3.90 - 12.70 K/uL    RBC 3.95 (L) 4.60 - 6.20 M/uL    Hemoglobin 11.3 (L) 14.0 - 18.0 g/dL    Hematocrit 34.5 (L) 40.0 - 54.0 %    MCV 87 82 - 98 fL    MCH 28.6 27.0 - 31.0 pg    MCHC 32.8 32.0 - 36.0 %    RDW 13.4 11.5 - 14.5 %    Platelets 206 150 - 350 K/uL    MPV 10.7 9.2 - 12.9 fL    Gran # 7.0 1.8 - 7.7 K/uL    Lymph # 2.3 1.0 - 4.8 K/uL    Mono # 1.0 0.3 - 1.0 K/uL    Eos # 0.1 0.0 - 0.5 K/uL    Baso # 0.01 0.00 - 0.20 K/uL    Gran% 67.1 38.0 - 73.0 %    Lymph% 22.2 18.0 - 48.0 %    Mono% 9.3 4.0 - 15.0 %    Eosinophil% 1.3 0.0 - 8.0 %    Basophil% 0.1 0.0 - 1.9 %    Differential Method  Automated    Comprehensive metabolic panel   Result Value Ref Range    Sodium 139 136 - 145 mmol/L    Potassium 4.1 3.5 - 5.1 mmol/L    Chloride 106 95 - 110 mmol/L    CO2 26 23 - 29 mmol/L    Glucose 133 (H) 70 - 110 mg/dL    BUN, Bld 6 6 - 20 mg/dL    Creatinine 0.8 0.5 - 1.4 mg/dL    Calcium 8.4 (L) 8.7 - 10.5 mg/dL    Total Protein 6.8 6.0 - 8.4 g/dL    Albumin 2.7 (L) 3.5 - 5.2 g/dL    Total Bilirubin 0.6 0.1 - 1.0 mg/dL    Alkaline Phosphatase 73 55 - 135 U/L    AST 26 10 - 40 U/L    ALT 26 10 - 44 U/L    Anion Gap 7 (L) 8 - 16 mmol/L    eGFR if African American >60 >60 mL/min/1.73 m^2    eGFR if non African American >60 >60 mL/min/1.73 m^2   Troponin I   Result Value Ref Range    Troponin I 0.069 (H) 0.000 - 0.026 ng/mL   CBC auto differential   Result Value Ref Range    WBC 12.95 (H) 3.90 - 12.70 K/uL    RBC 3.96 (L) 4.60 - 6.20 M/uL    Hemoglobin 11.2 (L) 14.0 - 18.0 g/dL    Hematocrit 34.5 (L) 40.0 - 54.0 %    MCV 87 82 - 98 fL    MCH 28.3 27.0 - 31.0 pg    MCHC 32.5 32.0 - 36.0 %    RDW 13.4 11.5 - 14.5 %    Platelets 231 150 - 350 K/uL    MPV 10.6 9.2 - 12.9 fL    Gran # 9.2 (H) 1.8 - 7.7 K/uL    Lymph # 2.6 1.0 - 4.8 K/uL    Mono # 1.0 0.3 - 1.0 K/uL    Eos # 0.2 0.0 - 0.5 K/uL    Baso # 0.01 0.00 - 0.20 K/uL    Gran% 71.3 38.0 - 73.0 %    Lymph% 19.8 18.0 - 48.0 %    Mono% 7.6 4.0 - 15.0 %    Eosinophil% 1.2 0.0 - 8.0 %    Basophil% 0.1 0.0 - 1.9 %    Differential Method Automated    Comprehensive metabolic panel   Result Value Ref Range    Sodium 136 136 - 145 mmol/L    Potassium 4.2 3.5 - 5.1 mmol/L    Chloride 103 95 - 110 mmol/L    CO2 24 23 - 29 mmol/L    Glucose 185 (H) 70 - 110 mg/dL    BUN, Bld 8 6 - 20 mg/dL    Creatinine 0.8 0.5 - 1.4 mg/dL    Calcium 8.5 (L) 8.7 - 10.5 mg/dL    Total Protein 7.1 6.0 - 8.4 g/dL    Albumin 2.8 (L) 3.5 - 5.2 g/dL    Total Bilirubin 0.4 0.1 - 1.0 mg/dL    Alkaline Phosphatase 96 55 - 135 U/L    AST 27 10 - 40 U/L    ALT 32 10 - 44 U/L    Anion Gap 9 8 - 16  mmol/L    eGFR if African American >60 >60 mL/min/1.73 m^2    eGFR if non African American >60 >60 mL/min/1.73 m^2         Imaging Results:  Imaging Results         CT Abdomen Pelvis With Contrast (In process) Per [facility] radi[facility], pt's CT results: Complex soft tissue/fluid collection at the cholecystectomy site, differential diagnosis includes seroma, early abscess formation versus biloma. Recommend clinical correlation and short interval follow up. Small right renal calculi without evidence of obstructive urolithiasis.           X-Ray Abdomen Flat And Erect (Final result) Result time:  03/21/17 23:53:22    Final result by Vimal Perez MD (03/21/17 23:53:22)    Impression:         Unremarkable exam.      Electronically signed by: VIMAL PEREZ MD  Date:     03/21/17  Time:    23:53     Narrative:    Examination: Abdomen, 2 views.    History:    Abdominal pain    Findings:    Flat and upright views of the abdomen reveal no evidence of free air or pneumatosis. Bowel gas pattern is within normal limits. Oral contrast noted in the colon from CT examination dated 03/16/2017. No suspicious calcifications.            X-ray chest PA and lateral (Final result) Result time:  03/21/17 23:52:29    Final result by Vimal Perez MD (03/21/17 23:52:29)    Impression:     Shallow breath. Possible ectatic edema.      Electronically signed by: VIMAL PEREZ MD  Date:     03/21/17  Time:    23:52     Narrative:    Exam: Chest X-ray, two views.    History: Abdominal pain    Findings: Increased interstitial markings noted in the mid to lower lung zones, more pronounced than that identified on prior exam dated 03/19/2017 this may be related to shallow breath. Hydrostatic edema not excluded. No airspace consolidation or pleural effusion. Heart size normal.                      The Emergency Provider reviewed the vital signs and test results, which are outlined above.    ED Discussion     11:27 AM: Re-evaluated pt. Pt is laying  in ED bed and in NAD.  Pt states he is still having abd pain.  D/w pt all pertinent results. D/w pt any concerns expressed at this time. Answered all questions. Pt expresses understanding at this time.    12:16 AM: Re-evaluated pt. Pt is laying in ED bed and in NAD.  Pt states his RUQ abd pain is less severe but is still having pain. D/w pt all pertinent results. D/w pt any concerns expressed at this time. Answered all questions. Pt expresses understanding at this time.    1:26 AM: Discussed case with Dr. Bishop (General Surgery). Dr. Bishop agrees with current care and management of pt and accepts admission.   Admitting Service: General Surgery   Admitting Physician: Dr. Bishop  Admit to: Obs        ED Medication(s):  Medications   hydromorphone (PF) injection 1 mg (1 mg Intravenous Not Given 3/21/17 2330)   ondansetron injection 4 mg (4 mg Intravenous Given 3/23/17 0942)   amiodarone tablet 400 mg (400 mg Oral Given 3/24/17 0834)   amlodipine tablet 10 mg (10 mg Oral Given 3/24/17 0834)   apixaban tablet 5 mg (5 mg Oral Given 3/24/17 0834)   aspirin EC tablet 81 mg (81 mg Oral Given 3/24/17 0834)   atorvastatin tablet 40 mg (40 mg Oral Given 3/23/17 2030)   lisinopril tablet 5 mg (5 mg Oral Given 3/24/17 0834)   metoprolol succinate (TOPROL-XL) 24 hr tablet 50 mg (50 mg Oral Given 3/24/17 0834)   pantoprazole EC tablet 40 mg (40 mg Oral Given 3/24/17 0834)   sertraline tablet 50 mg (50 mg Oral Given 3/24/17 0834)   gabapentin capsule 300 mg (300 mg Oral Given 3/24/17 0537)   albuterol nebulizer solution 2.5 mg (2.5 mg Nebulization Given 3/23/17 2035)   ipratropium 0.02 % nebulizer solution 0.5 mg (0.5 mg Nebulization Given 3/24/17 0724)   docusate sodium capsule 100 mg (100 mg Oral Given 3/24/17 0834)   hydrocodone-acetaminophen 10-325mg per tablet 1 tablet (not administered)   hydromorphone (PF) injection 1 mg (not administered)   sodium chloride 0.9% bolus 1,000 mL (0 mLs Intravenous Stopped 3/22/17 3940)    hydromorphone (PF) injection 0.5 mg (0.5 mg Intravenous Given 3/21/17 2238)   promethazine (PHENERGAN) 12.5 mg in dextrose 5 % 50 mL IVPB (0 mg Intravenous Stopped 3/21/17 2318)   omnipaque 350 iohexol 30 mL (30 mLs Oral Given 3/21/17 2235)   omnipaque 350 iohexol 75 mL (75 mLs Intravenous Given 3/22/17 0023)   bisacodyl EC tablet 10 mg (10 mg Oral Given 3/23/17 1211)   furosemide injection 20 mg (20 mg Intravenous Given 3/24/17 0843)       Current Discharge Medication List          Follow-up Information     Follow up with Eri Armstrong NP In 3 days.    Specialties:  Pulmonary Disease, Internal Medicine    Why:  shortness of breath, established patient    Contact information:    [address] [facility] [place] [address]  [phone]          Follow up with Briana Blackburn PA-C On 3/28/2017.    Specialty:  General Surgery    Why:  Post-op    Contact information:    [address] [place] [address]  [phone]              Medical Decision Making    Medical Decision Making:   Clinical Tests:   Lab Tests: Ordered and Reviewed  Radiological Study: Reviewed and Ordered           Scribe Attestation:   Scribe #1: I performed the above scribed service and the documentation accurately describes the services I performed. I attest to the accuracy of the note.    Attending:   Physician Attestation Statement for Scribe #1: I, Anibal Crespo MD, personally performed the services described in this documentation, as scribed by Dennis Whitaker, in my presence, and it is both accurate and complete.          Clinical Impression       ICD-10-CM ICD-9-CM   1. RUQ abdominal pain R10.11 789.01   2. Fluid collection at surgical site, initial encounter T88.8XXA 998.13       Disposition:   Disposition: Placed in Observation  Condition: Stable         Anibal Crespo MD  03/24/17 7568

## 2017-03-22 NOTE — PLAN OF CARE
Problem: Patient Care Overview  Goal: Plan of Care Review  Outcome: Ongoing (interventions implemented as appropriate)  Pt injury free this shift. Pain controlled. IVF infusing as ordered. Pt is tolerating clear liquid diet with no n/v or increased pain. Chart reviewed. Will monitor.

## 2017-03-22 NOTE — SUBJECTIVE & OBJECTIVE
Interval History: Admitted for evaluation of fluid collection. Plan to get HIDA to assess for bile leak.     Medications:  Continuous Infusions:   sodium chloride 0.9% 100 mL/hr at 03/22/17 0202     Scheduled Meds:   HYDROmorphone  1 mg Intravenous ED 1 Time     PRN Meds:HYDROmorphone, HYDROmorphone, ondansetron     Review of patient's allergies indicates:  No Known Allergies  Objective:     Vital Signs (Most Recent):  Temp: 98.5 °F (36.9 °C) (03/22/17 0919)  Pulse: 95 (03/22/17 0919)  Resp: 20 (03/22/17 0919)  BP: (!) 143/71 (03/22/17 0919)  SpO2: (!) 93 % (03/22/17 0919) Vital Signs (24h Range):  Temp:  [98.5 °F (36.9 °C)-98.9 °F (37.2 °C)] 98.5 °F (36.9 °C)  Pulse:  [81-98] 95  Resp:  [17-23] 20  SpO2:  [93 %-97 %] 93 %  BP: (112-157)/(70-91) 143/71     Weight: 92.1 kg (203 lb)  Body mass index is 33.78 kg/(m^2).    Intake/Output - Last 3 Shifts       03/20 0700 - 03/21 0659 03/21 0700 - 03/22 0659 03/22 0700 - 03/23 0659           Urine Occurrence   1 x          Physical Exam   Constitutional: He appears well-developed and well-nourished.   HENT:   Head: Normocephalic and atraumatic.   Eyes: EOM are normal. No scleral icterus.   Neck: Neck supple.   Cardiovascular: Normal rate and regular rhythm.    Pulmonary/Chest: Effort normal. No respiratory distress.   Abdominal: Soft. There is tenderness (mild epigastric and RUQ).   Incisions c/d/i   Musculoskeletal: Normal range of motion.   Skin: Skin is warm and dry.   Psychiatric: He has a normal mood and affect. Thought content normal.   Vitals reviewed.      Significant Labs:  CBC:   Recent Labs  Lab 03/22/17  0459   WBC 11.27   RBC 3.77*   HGB 10.7*   HCT 32.6*      MCV 87   MCH 28.4   MCHC 32.8     CMP:   Recent Labs  Lab 03/22/17  0459   *   CALCIUM 8.4*   ALBUMIN 2.7*   PROT 7.1   *   K 4.1   CO2 21*      BUN 9   CREATININE 0.8   ALKPHOS 65   ALT 30   AST 29   BILITOT 0.5         Significant Diagnostics:  CT: I have reviewed all  pertinent results/findings within the past 24 hours and my personal findings are:  fluid collection in gallbladder fossa

## 2017-03-22 NOTE — ED NOTES
Received report from MAYNOR Ledezma. Pt in NAD,VSS, RR equal and unlabored. Pt awaiting bed on unit. Pt's bed is low, locked, and call light in reach. SR up x 2. Will continue to monitor pt.

## 2017-03-22 NOTE — ED NOTES
Pt sleeping in bed. No acute distress, RR equal and non labored, VSS. Bed in low, locked, and call light in reach. Side rails up X 2. Will continue to monitor.

## 2017-03-22 NOTE — ED NOTES
Called to patient's room, patient stating that his bed was wet.  Bed sheets were found to be saturated with urine.  It appears patient has had an episode of incontinence in his sleep.  Patient states this does not normally happen.  Bed has been cleaned and fresh linens were placed.  Patient is now back resting in bed.  He denies any further needs at this time.  Will continue to monitor.

## 2017-03-22 NOTE — ED NOTES
Patient helped up in bed to void in urinal.  Urine specimen was able to be obtained at this time.  Patient now back resting in bed.  He denies any further needs at this time.  Will continue to monitor.

## 2017-03-22 NOTE — H&P
[facility] -   General Surgery  History and Physical    Subjective:     History of Present Illness:  Crow Green presented to the ED 5 days s/p lap aidan with complaint of increased right upper abdominal pain starting yesterday. The pain is moderate 7/10, and constant. He denies fever, chills, nausea, vomiting. CT shows a complex fluid collection in the gallbladder fossa.     Post-Op Info:  * No surgery found *         Interval History: Admitted for evaluation of fluid collection. Plan to get HIDA to assess for bile leak.     Medications:  Continuous Infusions:   sodium chloride 0.9% 100 mL/hr at 03/22/17 0202     Scheduled Meds:   HYDROmorphone  1 mg Intravenous ED 1 Time     PRN Meds:HYDROmorphone, HYDROmorphone, ondansetron     Review of patient's allergies indicates:  No Known Allergies  Objective:     Vital Signs (Most Recent):  Temp: 98.5 °F (36.9 °C) (03/22/17 0919)  Pulse: 95 (03/22/17 0919)  Resp: 20 (03/22/17 0919)  BP: (!) 143/71 (03/22/17 0919)  SpO2: (!) 93 % (03/22/17 0919) Vital Signs (24h Range):  Temp:  [98.5 °F (36.9 °C)-98.9 °F (37.2 °C)] 98.5 °F (36.9 °C)  Pulse:  [81-98] 95  Resp:  [17-23] 20  SpO2:  [93 %-97 %] 93 %  BP: (112-157)/(70-91) 143/71     Weight: 92.1 kg (203 lb)  Body mass index is 33.78 kg/(m^2).    Intake/Output - Last 3 Shifts       03/20 0700 - 03/21 0659 03/21 0700 - 03/22 0659 03/22 0700 - 03/23 0659           Urine Occurrence   1 x          Physical Exam   Constitutional: He appears well-developed and well-nourished.   HENT:   Head: Normocephalic and atraumatic.   Eyes: EOM are normal. No scleral icterus.   Neck: Neck supple.   Cardiovascular: Normal rate and regular rhythm.    Pulmonary/Chest: Effort normal. No respiratory distress.   Abdominal: Soft. There is tenderness (mild epigastric and RUQ).   Incisions c/d/i   Musculoskeletal: Normal range of motion.   Skin: Skin is warm and dry.   Psychiatric: He has a normal mood and affect. Thought content  normal.   Vitals reviewed.      Significant Labs:  CBC:   Recent Labs  Lab 03/22/17  0459   WBC 11.27   RBC 3.77*   HGB 10.7*   HCT 32.6*      MCV 87   MCH 28.4   MCHC 32.8     CMP:   Recent Labs  Lab 03/22/17  0459   *   CALCIUM 8.4*   ALBUMIN 2.7*   PROT 7.1   *   K 4.1   CO2 21*      BUN 9   CREATININE 0.8   ALKPHOS 65   ALT 30   AST 29   BILITOT 0.5         Significant Diagnostics:  CT: I have reviewed all pertinent results/findings within the past 24 hours and my personal findings are:  fluid collection in gallbladder fossa    Assessment/Plan:     * RUQ abdominal pain  Fluid collection in gabladder fossa seen on CT. Normal labs, afebrile. 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     Asthma  Reorder home meds      Briana Blackburn PA-C  General Surgery  [facility]

## 2017-03-22 NOTE — PROGRESS NOTES
[facility] -   General Surgery  Progress Note    Subjective:     History of Present Illness:  Crow Green presented to the ED 5 days s/p lap aidan with complaint of increased right upper abdominal pain starting yesterday. The pain is moderate 7/10, and constant. He denies fever, chills, nausea, vomiting. CT shows a complex fluid collection in the gallbladder fossa.     Post-Op Info:  Lap aidan        Interval History: Admitted for evaluation of fluid collection. Plan to get HIDA to assess for bile leak.     Medications:  Continuous Infusions:   sodium chloride 0.9% 100 mL/hr at 03/22/17 0202     Scheduled Meds:   HYDROmorphone  1 mg Intravenous ED 1 Time     PRN Meds:HYDROmorphone, HYDROmorphone, ondansetron     Review of patient's allergies indicates:  No Known Allergies  Objective:     Vital Signs (Most Recent):  Temp: 98.5 °F (36.9 °C) (03/22/17 0919)  Pulse: 95 (03/22/17 0919)  Resp: 20 (03/22/17 0919)  BP: (!) 143/71 (03/22/17 0919)  SpO2: (!) 93 % (03/22/17 0919) Vital Signs (24h Range):  Temp:  [98.5 °F (36.9 °C)-98.9 °F (37.2 °C)] 98.5 °F (36.9 °C)  Pulse:  [81-98] 95  Resp:  [17-23] 20  SpO2:  [93 %-97 %] 93 %  BP: (112-157)/(70-91) 143/71     Weight: 92.1 kg (203 lb)  Body mass index is 33.78 kg/(m^2).    Intake/Output - Last 3 Shifts       03/20 0700 - 03/21 0659 03/21 0700 - 03/22 0659 03/22 0700 - 03/23 0659           Urine Occurrence   1 x          Physical Exam   Constitutional: He appears well-developed and well-nourished.   HENT:   Head: Normocephalic and atraumatic.   Eyes: EOM are normal. No scleral icterus.   Neck: Neck supple.   Cardiovascular: Normal rate and regular rhythm.    Pulmonary/Chest: Effort normal. No respiratory distress.   Abdominal: Soft. There is tenderness (mild epigastric and RUQ).   Incisions c/d/i   Musculoskeletal: Normal range of motion.   Skin: Skin is warm and dry.   Psychiatric: He has a normal mood and affect. Thought content normal.   Vitals  reviewed.      Significant Labs:  CBC:   Recent Labs  Lab 03/22/17  0459   WBC 11.27   RBC 3.77*   HGB 10.7*   HCT 32.6*      MCV 87   MCH 28.4   MCHC 32.8     CMP:   Recent Labs  Lab 03/22/17  0459   *   CALCIUM 8.4*   ALBUMIN 2.7*   PROT 7.1   *   K 4.1   CO2 21*      BUN 9   CREATININE 0.8   ALKPHOS 65   ALT 30   AST 29   BILITOT 0.5         Significant Diagnostics:  CT: I have reviewed all pertinent results/findings within the past 24 hours and my personal findings are:  fluid collection in gallbladder fossa    Assessment/Plan:     * RUQ abdominal pain  Fluid collection in gabladder fossa seen on CT. Normal labs, afebrile. Plan to get HIDA scan to assess for bile leak/biloma. If negative he could be discharged with outpatient follow up in general surgery clinic.      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     Asthma  Reorder home meds      Briana Blackburn PA-C  General Surgery  [facility] [place]

## 2017-03-22 NOTE — ASSESSMENT & PLAN NOTE
Fluid collection in gabladder fossa seen on CT. Normal labs, afebrile. Plan to get HIDA scan to assess for bile leak/biloma

## 2017-03-22 NOTE — ED NOTES
Patient has completed drinking his oral contrast.  Patient is sleeping at this time. No acute distress is noted, RR equal and non labored, VSS. Bed in low, locked, position and call light is within reach. Side rails up X 2. Will continue to monitor.

## 2017-03-23 LAB
ALBUMIN SERPL BCP-MCNC: 2.7 G/DL
ALP SERPL-CCNC: 73 U/L
ALT SERPL W/O P-5'-P-CCNC: 26 U/L
ANION GAP SERPL CALC-SCNC: 7 MMOL/L
AST SERPL-CCNC: 26 U/L
BASOPHILS # BLD AUTO: 0.01 K/UL
BASOPHILS NFR BLD: 0.1 %
BILIRUB SERPL-MCNC: 0.6 MG/DL
BUN SERPL-MCNC: 6 MG/DL
CALCIUM SERPL-MCNC: 8.4 MG/DL
CHLORIDE SERPL-SCNC: 106 MMOL/L
CO2 SERPL-SCNC: 26 MMOL/L
CREAT SERPL-MCNC: 0.8 MG/DL
DIFFERENTIAL METHOD: ABNORMAL
EOSINOPHIL # BLD AUTO: 0.1 K/UL
EOSINOPHIL NFR BLD: 1.3 %
ERYTHROCYTE [DISTWIDTH] IN BLOOD BY AUTOMATED COUNT: 13.4 %
EST. GFR  (AFRICAN AMERICAN): >60 ML/MIN/1.73 M^2
EST. GFR  (NON AFRICAN AMERICAN): >60 ML/MIN/1.73 M^2
GLUCOSE SERPL-MCNC: 133 MG/DL
HCT VFR BLD AUTO: 34.5 %
HGB BLD-MCNC: 11.3 G/DL
LYMPHOCYTES # BLD AUTO: 2.3 K/UL
LYMPHOCYTES NFR BLD: 22.2 %
MCH RBC QN AUTO: 28.6 PG
MCHC RBC AUTO-ENTMCNC: 32.8 %
MCV RBC AUTO: 87 FL
MONOCYTES # BLD AUTO: 1 K/UL
MONOCYTES NFR BLD: 9.3 %
NEUTROPHILS # BLD AUTO: 7 K/UL
NEUTROPHILS NFR BLD: 67.1 %
PLATELET # BLD AUTO: 206 K/UL
PMV BLD AUTO: 10.7 FL
POTASSIUM SERPL-SCNC: 4.1 MMOL/L
PROT SERPL-MCNC: 6.8 G/DL
RBC # BLD AUTO: 3.95 M/UL
SODIUM SERPL-SCNC: 139 MMOL/L
TROPONIN I SERPL DL<=0.01 NG/ML-MCNC: 0.07 NG/ML
WBC # BLD AUTO: 10.5 K/UL

## 2017-03-23 PROCEDURE — 63600175 PHARM REV CODE 636 W HCPCS: Performed by: SURGERY

## 2017-03-23 PROCEDURE — 25000242 PHARM REV CODE 250 ALT 637 W/ HCPCS: Performed by: SURGERY

## 2017-03-23 PROCEDURE — 97161 PT EVAL LOW COMPLEX 20 MIN: CPT

## 2017-03-23 PROCEDURE — 36415 COLL VENOUS BLD VENIPUNCTURE: CPT

## 2017-03-23 PROCEDURE — 80053 COMPREHEN METABOLIC PANEL: CPT

## 2017-03-23 PROCEDURE — G8980 MOBILITY D/C STATUS: HCPCS | Mod: CH

## 2017-03-23 PROCEDURE — 63600175 PHARM REV CODE 636 W HCPCS: Performed by: EMERGENCY MEDICINE

## 2017-03-23 PROCEDURE — 85025 COMPLETE CBC W/AUTO DIFF WBC: CPT

## 2017-03-23 PROCEDURE — 84484 ASSAY OF TROPONIN QUANT: CPT

## 2017-03-23 PROCEDURE — 94640 AIRWAY INHALATION TREATMENT: CPT

## 2017-03-23 PROCEDURE — 94799 UNLISTED PULMONARY SVC/PX: CPT

## 2017-03-23 PROCEDURE — 97116 GAIT TRAINING THERAPY: CPT

## 2017-03-23 PROCEDURE — 25000003 PHARM REV CODE 250: Performed by: SURGERY

## 2017-03-23 PROCEDURE — 94761 N-INVAS EAR/PLS OXIMETRY MLT: CPT

## 2017-03-23 PROCEDURE — G0378 HOSPITAL OBSERVATION PER HR: HCPCS

## 2017-03-23 PROCEDURE — G8978 MOBILITY CURRENT STATUS: HCPCS | Mod: CH

## 2017-03-23 PROCEDURE — 99900035 HC TECH TIME PER 15 MIN (STAT)

## 2017-03-23 PROCEDURE — 27000221 HC OXYGEN, UP TO 24 HOURS

## 2017-03-23 PROCEDURE — G8979 MOBILITY GOAL STATUS: HCPCS | Mod: CH

## 2017-03-23 RX ORDER — DOCUSATE SODIUM 100 MG/1
100 CAPSULE, LIQUID FILLED ORAL 2 TIMES DAILY
Status: DISCONTINUED | OUTPATIENT
Start: 2017-03-23 | End: 2017-03-24 | Stop reason: HOSPADM

## 2017-03-23 RX ORDER — AMLODIPINE BESYLATE 10 MG/1
10 TABLET ORAL DAILY
Status: DISCONTINUED | OUTPATIENT
Start: 2017-03-23 | End: 2017-03-24 | Stop reason: HOSPADM

## 2017-03-23 RX ORDER — GABAPENTIN 300 MG/1
300 CAPSULE ORAL 3 TIMES DAILY
Status: DISCONTINUED | OUTPATIENT
Start: 2017-03-23 | End: 2017-03-24 | Stop reason: HOSPADM

## 2017-03-23 RX ORDER — SERTRALINE HYDROCHLORIDE 50 MG/1
50 TABLET, FILM COATED ORAL DAILY
Status: DISCONTINUED | OUTPATIENT
Start: 2017-03-23 | End: 2017-03-24 | Stop reason: HOSPADM

## 2017-03-23 RX ORDER — TIOTROPIUM BROMIDE 18 UG/1
18 CAPSULE ORAL; RESPIRATORY (INHALATION) DAILY
Status: DISCONTINUED | OUTPATIENT
Start: 2017-03-23 | End: 2017-03-23

## 2017-03-23 RX ORDER — ALBUTEROL SULFATE 90 UG/1
2 AEROSOL, METERED RESPIRATORY (INHALATION) EVERY 6 HOURS PRN
Status: DISCONTINUED | OUTPATIENT
Start: 2017-03-23 | End: 2017-03-23

## 2017-03-23 RX ORDER — METOPROLOL SUCCINATE 50 MG/1
50 TABLET, EXTENDED RELEASE ORAL DAILY
Status: DISCONTINUED | OUTPATIENT
Start: 2017-03-23 | End: 2017-03-24 | Stop reason: HOSPADM

## 2017-03-23 RX ORDER — IPRATROPIUM BROMIDE 0.5 MG/2.5ML
0.5 SOLUTION RESPIRATORY (INHALATION) EVERY 6 HOURS
Status: DISCONTINUED | OUTPATIENT
Start: 2017-03-23 | End: 2017-03-24 | Stop reason: HOSPADM

## 2017-03-23 RX ORDER — ALBUTEROL SULFATE 0.83 MG/ML
2.5 SOLUTION RESPIRATORY (INHALATION) EVERY 6 HOURS PRN
Status: DISCONTINUED | OUTPATIENT
Start: 2017-03-23 | End: 2017-03-24 | Stop reason: HOSPADM

## 2017-03-23 RX ORDER — LISINOPRIL 5 MG/1
5 TABLET ORAL DAILY
Status: DISCONTINUED | OUTPATIENT
Start: 2017-03-23 | End: 2017-03-24 | Stop reason: HOSPADM

## 2017-03-23 RX ORDER — BISACODYL 5 MG
10 TABLET, DELAYED RELEASE (ENTERIC COATED) ORAL ONCE
Status: COMPLETED | OUTPATIENT
Start: 2017-03-23 | End: 2017-03-23

## 2017-03-23 RX ORDER — PANTOPRAZOLE SODIUM 40 MG/1
40 TABLET, DELAYED RELEASE ORAL DAILY
Status: DISCONTINUED | OUTPATIENT
Start: 2017-03-23 | End: 2017-03-24 | Stop reason: HOSPADM

## 2017-03-23 RX ORDER — ASPIRIN 81 MG/1
81 TABLET ORAL DAILY
Status: DISCONTINUED | OUTPATIENT
Start: 2017-03-23 | End: 2017-03-24 | Stop reason: HOSPADM

## 2017-03-23 RX ORDER — ATORVASTATIN CALCIUM 40 MG/1
40 TABLET, FILM COATED ORAL NIGHTLY
Status: DISCONTINUED | OUTPATIENT
Start: 2017-03-23 | End: 2017-03-24 | Stop reason: HOSPADM

## 2017-03-23 RX ORDER — AMIODARONE HYDROCHLORIDE 200 MG/1
400 TABLET ORAL DAILY
Status: DISCONTINUED | OUTPATIENT
Start: 2017-03-23 | End: 2017-03-24 | Stop reason: HOSPADM

## 2017-03-23 RX ADMIN — IPRATROPIUM BROMIDE 0.5 MG: 0.5 SOLUTION RESPIRATORY (INHALATION) at 12:03

## 2017-03-23 RX ADMIN — ASPIRIN 81 MG: 81 TABLET, COATED ORAL at 08:03

## 2017-03-23 RX ADMIN — APIXABAN 5 MG: 2.5 TABLET, FILM COATED ORAL at 08:03

## 2017-03-23 RX ADMIN — IPRATROPIUM BROMIDE 0.5 MG: 0.5 SOLUTION RESPIRATORY (INHALATION) at 08:03

## 2017-03-23 RX ADMIN — SERTRALINE HYDROCHLORIDE 50 MG: 50 TABLET, FILM COATED ORAL at 08:03

## 2017-03-23 RX ADMIN — AMIODARONE HYDROCHLORIDE 400 MG: 200 TABLET ORAL at 08:03

## 2017-03-23 RX ADMIN — LISINOPRIL 5 MG: 5 TABLET ORAL at 08:03

## 2017-03-23 RX ADMIN — ALBUTEROL SULFATE 2.5 MG: 2.5 SOLUTION RESPIRATORY (INHALATION) at 08:03

## 2017-03-23 RX ADMIN — PANTOPRAZOLE SODIUM 40 MG: 40 TABLET, DELAYED RELEASE ORAL at 08:03

## 2017-03-23 RX ADMIN — HYDROMORPHONE HYDROCHLORIDE 1 MG: 2 INJECTION, SOLUTION INTRAMUSCULAR; INTRAVENOUS; SUBCUTANEOUS at 05:03

## 2017-03-23 RX ADMIN — HYDROMORPHONE HYDROCHLORIDE 1 MG: 2 INJECTION, SOLUTION INTRAMUSCULAR; INTRAVENOUS; SUBCUTANEOUS at 06:03

## 2017-03-23 RX ADMIN — AMLODIPINE BESYLATE 10 MG: 10 TABLET ORAL at 08:03

## 2017-03-23 RX ADMIN — HYDROMORPHONE HYDROCHLORIDE 1 MG: 2 INJECTION, SOLUTION INTRAMUSCULAR; INTRAVENOUS; SUBCUTANEOUS at 11:03

## 2017-03-23 RX ADMIN — HYDROMORPHONE HYDROCHLORIDE 1 MG: 2 INJECTION, SOLUTION INTRAMUSCULAR; INTRAVENOUS; SUBCUTANEOUS at 12:03

## 2017-03-23 RX ADMIN — METOPROLOL SUCCINATE 50 MG: 50 TABLET, EXTENDED RELEASE ORAL at 08:03

## 2017-03-23 RX ADMIN — GABAPENTIN 300 MG: 300 CAPSULE ORAL at 01:03

## 2017-03-23 RX ADMIN — ATORVASTATIN CALCIUM 40 MG: 40 TABLET, FILM COATED ORAL at 08:03

## 2017-03-23 RX ADMIN — ONDANSETRON 4 MG: 2 INJECTION INTRAMUSCULAR; INTRAVENOUS at 09:03

## 2017-03-23 RX ADMIN — HYDROMORPHONE HYDROCHLORIDE 1 MG: 2 INJECTION, SOLUTION INTRAMUSCULAR; INTRAVENOUS; SUBCUTANEOUS at 09:03

## 2017-03-23 RX ADMIN — HYDROMORPHONE HYDROCHLORIDE 1 MG: 2 INJECTION, SOLUTION INTRAMUSCULAR; INTRAVENOUS; SUBCUTANEOUS at 02:03

## 2017-03-23 RX ADMIN — DOCUSATE SODIUM 100 MG: 100 CAPSULE, LIQUID FILLED ORAL at 08:03

## 2017-03-23 RX ADMIN — BISACODYL 10 MG: 5 TABLET, COATED ORAL at 12:03

## 2017-03-23 RX ADMIN — DOCUSATE SODIUM 100 MG: 100 CAPSULE, LIQUID FILLED ORAL at 01:03

## 2017-03-23 RX ADMIN — GABAPENTIN 300 MG: 300 CAPSULE ORAL at 09:03

## 2017-03-23 NOTE — SUBJECTIVE & OBJECTIVE
Interval History: Abdominal pain somewhat improved, minimal bowel function, tolerating diet, short of breath, HIDA scan negative  Medications:  Continuous Infusions:     Scheduled Meds:   amiodarone  400 mg Oral Daily    amlodipine  10 mg Oral Daily    apixaban  5 mg Oral BID    aspirin  81 mg Oral Daily    atorvastatin  40 mg Oral QHS    gabapentin  300 mg Oral TID    ipratropium  0.5 mg Nebulization Q6H    lisinopril  5 mg Oral Daily    metoprolol succinate  50 mg Oral Daily    pantoprazole  40 mg Oral Daily    sertraline  50 mg Oral Daily     PRN Meds:albuterol **AND** Inhalation Treatment Q6H PRN, HYDROmorphone, HYDROmorphone, ondansetron     Review of patient's allergies indicates:  No Known Allergies  Objective:     Vital Signs (Most Recent):  Temp: 98.3 °F (36.8 °C) (03/23/17 0738)  Pulse: 88 (03/23/17 0805)  Resp: 18 (03/23/17 0805)  BP: 133/63 (03/23/17 0738)  SpO2: 96 % (03/23/17 0805) Vital Signs (24h Range):  Temp:  [97.5 °F (36.4 °C)-98.3 °F (36.8 °C)] 98.3 °F (36.8 °C)  Pulse:  [78-95] 88  Resp:  [17-20] 18  SpO2:  [94 %-100 %] 96 %  BP: (133-147)/(63-76) 133/63     Weight: 92.1 kg (203 lb)  Body mass index is 33.78 kg/(m^2).    Intake/Output - Last 3 Shifts       03/21 0700 - 03/22 0659 03/22 0700 - 03/23 0659 03/23 0700 - 03/24 0659    P.O.  460     I.V. (mL/kg)  2718.3 (29.5) 278.3 (3)    Total Intake(mL/kg)  3178.3 (34.5) 278.3 (3)    Net   +3178.3 +278.3           Urine Occurrence  3 x     Stool Occurrence  1 x 0 x          Physical Exam   Constitutional: He appears well-developed and well-nourished.   HENT:   Head: Normocephalic and atraumatic.   Eyes: EOM are normal. No scleral icterus.   Neck: Neck supple.   Cardiovascular: Normal rate and regular rhythm.    Pulmonary/Chest: Effort normal. No respiratory distress.   Abdominal: Soft. There is tenderness (mild epigastric and RUQ).   Incisions c/d/i   Musculoskeletal: Normal range of motion.   Skin: Skin is warm and dry.   Psychiatric:  He has a normal mood and affect. Thought content normal.   Vitals reviewed.      Significant Labs:  CBC:     Recent Labs  Lab 03/23/17 0457   WBC 10.50   RBC 3.95*   HGB 11.3*   HCT 34.5*      MCV 87   MCH 28.6   MCHC 32.8     CMP:     Recent Labs  Lab 03/23/17 0457   *   CALCIUM 8.4*   ALBUMIN 2.7*   PROT 6.8      K 4.1   CO2 26      BUN 6   CREATININE 0.8   ALKPHOS 73   ALT 26   AST 26   BILITOT 0.6         Significant Diagnostics:  HIDA no sign of leak

## 2017-03-23 NOTE — PLAN OF CARE
Pt remains free of injuries. Pain moderately controlled c PRN meds and relaxation techniques. No BM yet. Ambulated in ascencio. 12 hr chart check completed. Will continue to monitor.

## 2017-03-23 NOTE — PLAN OF CARE
Problem: Patient Care Overview  Goal: Plan of Care Review  Outcome: Ongoing (interventions implemented as appropriate)  Remains free from harm, pain controlled via IV pain meds, tolerated diet, no acute distress noted. 24 hour chart check complete.

## 2017-03-23 NOTE — PROGRESS NOTES
Home Oxygen Evaluation    Date Performed: 3/23/2017    1) Patient's Home O2 Sat on room air, while at rest: 93        If O2 sats on room air at rest are 88% or below, patient qualifies. No additional testing needed. Document N/A in steps 2 and 3. If 89% or above, complete steps 2.      2) Patient's O2 Sat on room air while exercisin        If O2 sats on room air while exercising remain 89% or above patient does not qualify, no further testing needed Document N/A in step 3. If O2 sats on room air while exercising are 88% or below, continue to step 3.      3) Patient's O2 Sat while exercising on O2: N/A at N/A     Patient does not qualify according to Eval.         (Must show improvement from #2 for patients to qualify)    If O2 sats improve on oxygen, patient qualifies for portable oxygen. If not, the patient does not qualify.

## 2017-03-23 NOTE — PROGRESS NOTES
Pt c/o nausea after first regular diet intake. Ate 75% of meal. Administered Zofran 4 mg IVP. Encouraged to turn on lt side and elevate HOB 30 degrees. Will continue to monitor.

## 2017-03-23 NOTE — PROGRESS NOTES
[facility] -   General Surgery  Progress Note    Subjective:     History of Present Illness:  Crow Green presented to the ED 5 days s/p lap aidan with complaint of increased right upper abdominal pain starting yesterday. The pain is moderate 7/10, and constant. He denies fever, chills, nausea, vomiting. CT shows a complex fluid collection in the gallbladder fossa.     Post-Op Info:  * No surgery found *         Interval History: Abdominal pain somewhat improved, minimal bowel function, tolerating diet, short of breath, HIDA scan negative  Medications:  Continuous Infusions:     Scheduled Meds:   amiodarone  400 mg Oral Daily    amlodipine  10 mg Oral Daily    apixaban  5 mg Oral BID    aspirin  81 mg Oral Daily    atorvastatin  40 mg Oral QHS    gabapentin  300 mg Oral TID    ipratropium  0.5 mg Nebulization Q6H    lisinopril  5 mg Oral Daily    metoprolol succinate  50 mg Oral Daily    pantoprazole  40 mg Oral Daily    sertraline  50 mg Oral Daily     PRN Meds:albuterol **AND** Inhalation Treatment Q6H PRN, HYDROmorphone, HYDROmorphone, ondansetron     Review of patient's allergies indicates:  No Known Allergies  Objective:     Vital Signs (Most Recent):  Temp: 98.3 °F (36.8 °C) (03/23/17 0738)  Pulse: 88 (03/23/17 0805)  Resp: 18 (03/23/17 0805)  BP: 133/63 (03/23/17 0738)  SpO2: 96 % (03/23/17 0805) Vital Signs (24h Range):  Temp:  [97.5 °F (36.4 °C)-98.3 °F (36.8 °C)] 98.3 °F (36.8 °C)  Pulse:  [78-95] 88  Resp:  [17-20] 18  SpO2:  [94 %-100 %] 96 %  BP: (133-147)/(63-76) 133/63     Weight: 92.1 kg (203 lb)  Body mass index is 33.78 kg/(m^2).    Intake/Output - Last 3 Shifts       03/21 0700 - 03/22 0659 03/22 0700 - 03/23 0659 03/23 0700 - 03/24 0659    P.O.  460     I.V. (mL/kg)  2718.3 (29.5) 278.3 (3)    Total Intake(mL/kg)  3178.3 (34.5) 278.3 (3)    Net   +3178.3 +278.3           Urine Occurrence  3 x     Stool Occurrence  1 x 0 x          Physical Exam   Constitutional: He  appears well-developed and well-nourished.   HENT:   Head: Normocephalic and atraumatic.   Eyes: EOM are normal. No scleral icterus.   Neck: Neck supple.   Cardiovascular: Normal rate and regular rhythm.    Pulmonary/Chest: Effort normal. No respiratory distress.   Abdominal: Soft. There is tenderness (mild epigastric and RUQ).   Incisions c/d/i   Musculoskeletal: Normal range of motion.   Skin: Skin is warm and dry.   Psychiatric: He has a normal mood and affect. Thought content normal.   Vitals reviewed.      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      Shortness of breath - CXR, pulm toilet  Physical therapy eval, ambulate - pt reports not getting around well when discharged last time will consult social work for home health    Torin Bishop MD  General Surgery  [facility]

## 2017-03-23 NOTE — PT/OT/SLP EVAL
Physical Therapy  Evaluation    Crow Green   MRN: [id number]   Admitting Diagnosis: RUQ abdominal pain    PT Received On: 17  PT Start Time: 1156     PT Stop Time: 1220    PT Total Time (min): 24 min       Billable Minutes:  Evaluation 14 and Gait [id number]    Diagnosis: RUQ abdominal pain      Past Medical History:   Diagnosis Date    Arthritis     Asthma     Depression     Diabetes mellitus     Diabetes mellitus, type 2 2000    Elevated PSA     Hypertension       History reviewed. No pertinent surgical history.    Referring physician: SANYA  Date referred to PT: 3/23/2017      General Precautions: Standard,    Orthopedic Precautions:     Braces:              Patient History:  Lives With: parent(s)  Living Arrangements: house  Home Accessibility: stairs to enter home  Home Layout: Able to live on 1st floor  Number of Stairs to Enter Home: 4  Living Environment Comment: PT LIVES AT HOME WITH FATHER AND WAS IND AND DRIVING.   DME owned (not currently used): none    Previous Level of Function:  Ambulation Skills: independent  Transfer Skills: independent  ADL Skills: independent  Work/Leisure Activity: independent    Subjective:  Communicated with NURSE JULIEN AND EPIC CHART REVIEW  prior to session.   PT AGREED TO EVAL AND TX   Chief Complaint: PAIN  Patient goals: NONE STATED    Pain Ratin/10         Location: abdomen     Pain Rating Post-Intervention: 4/10    Objective:   Patient found with: peripheral IV     Cognitive Exam:  Oriented to: Person, Place, Time and Situation    Follows Commands/attention: Follows multistep  commands  Communication: clear/fluent  Safety awareness/insight to disability: intact    Physical Exam:  Postural examination/scapula alignment: Rounded shoulder    Skin integrity: Visible skin intact  Edema: None noted     Sensation:   Intact    Upper Extremity Range of Motion:  Right Upper Extremity: WFL  Left Upper Extremity: WFL    Upper Extremity Strength:  Right Upper  Extremity: WFL  Left Upper Extremity: WFL    Lower Extremity Range of Motion:  Right Lower Extremity: WFL  Left Lower Extremity: WFL    Lower Extremity Strength:  Right Lower Extremity: WFL  Left Lower Extremity: WFL       Functional Mobility:  Bed Mobility:  Supine to Sit: Modified Independent    Transfers:  Sit <> Stand Assistance: Independent  Sit <> Stand Assistive Device: No Assistive Device  Bed <> Chair Technique: Stand Pivot  Bed <> Chair Assistance: Independent  Bed <> Chair Assistive Device: No Assistive Device    Gait:   Gait Distance: PT GT TRAINED WITH NO AD X 180' WITH NO LOB.   Assistance 1: Independent  Gait Assistive Device: No device  Gait Pattern: swing-through gait  Gait Deviation(s): decreased hayde      Balance:   Static Sit: NORMAL: No deviations seen in posture held statically  Dynamic Sit: NORMAL: No deviations seen in posture held dynamically  Static Stand: NORMAL: No deviations seen in posture held statically  Dynamic stand: GOOD+: Independent gait (with or without assistive device)    Therapeutic Activities and Exercises:  PT GT TRAINED ON LEVEL INDOOR SURFACES WITH ONE STAND REST BREAK. PT RETURNED TO  T/F TO BEDSIDE IN AND LEFT SEATED WITH ALL NEEDS MET. PT EDUCATED ON MOBILITY PROGRAM.     AM-PAC 6 CLICK MOBILITY  How much help from another person does this patient currently need?   1 = Unable, Total/Dependent Assistance  2 = A lot, Maximum/Moderate Assistance  3 = A little, Minimum/Contact Guard/Supervision  4 = None, Modified [place]/Independent          AM-PAC Raw Score CMS G-Code Modifier Level of Impairment Assistance   6 % Total / Unable   7 - 9 CM 80 - 100% Maximal Assist   10 - 14 CL 60 - 80% Moderate Assist   15 - 19 CK 40 - 60% Moderate Assist   20 - 22 CJ 20 - 40% Minimal Assist   23 CI 1-20% SBA / CGA   24 CH 0% Independent/ Mod I     Patient left up in chair with call button in reach.    Assessment:   Crow Green is a 61 y.o. male with a medical  diagnosis of RUQ abdominal pain and presents with NO GROSS FUNC DEFICITS. PT WILL BE D/C FROM P.T. TO MOBILITY PROGRAM FOR WALKING.    Discharge recommendations: Discharge Facility/Level Of Care Needs: home     Barriers to discharge: Barriers to Discharge: None    Equipment recommendations: Equipment Needed After Discharge: none     PLAN:    D/C PT TO MOBILITY PROGRAM FOR WALKING.     Functional Assessment Tool Used: BOSTON  PAC  Score: CH  Functional Limitation: Mobility: Walking and moving around  Mobility: Walking and Moving Around Current Status ():   Mobility: Walking and Moving Around Goal Status ():   Mobility: Walking and Moving Around Discharge Status (): IVAN Salas, PT  03/23/2017

## 2017-03-23 NOTE — PLAN OF CARE
CM met with patient regarding discharge planning.  CM advised of recommendation for home health.  Patient in agreement with home health.  Patient chose [facility] as provider to render services.  Choice form signed, yellow copy given to patient, white copy placed in blue folder.  No other post hospital needs identified.      03/23/17 1326   Discharge Assessment   Assessment Type Discharge Planning Assessment   Confirmed/corrected address and phone number on facesheet? Yes   Assessment information obtained from? Patient   Expected Length of Stay (days) 2   Communicated expected length of stay with patient/caregiver yes   Prior to hospitilization cognitive status: Alert/Oriented   Prior to hospitalization functional status: Independent   Current cognitive status: Alert/Oriented   Current Functional Status: Independent   Arrived From home or self-care   Lives With parent(s)   Able to Return to Prior Arrangements yes   Is patient able to care for self after discharge? Yes   How many people do you have in your home that can help with your care after discharge? 0   Patient's perception of discharge disposition home health;home or selfcare   Patient currently being followed by outpatient case management? No   Patient currently receives home health services? No   Does the patient currently use HME? Yes   Patient currently receives private duty nursing? No   Patient currently receives any other outside agency services? No   Equipment Currently Used at Home CPAP   Do you have any problems affording any of your prescribed medications? No   Do you have any financial concerns preventing you from receiving the healthcare you need? No   Does the patient have transportation to healthcare appointments? Yes   Transportation Available Medicaid transportation   On Dialysis? No   Does the patient receive services at the Coumadin Clinic? No   Are there any open cases? No   Discharge Plan A Home;Home Health   Discharge Plan B  Home;Home Health   Patient/Family In Agreement With Plan yes

## 2017-03-23 NOTE — PLAN OF CARE
CM received call from Katey with [facility] stating she could accept the patient due to patient's primary care physician.  CM telephoned and spoke with patient regarding [facility] being unable to render services.  Patient elected to have [facility] render services.

## 2017-03-24 VITALS
RESPIRATION RATE: 18 BRPM | WEIGHT: 203 LBS | DIASTOLIC BLOOD PRESSURE: 70 MMHG | OXYGEN SATURATION: 94 % | TEMPERATURE: 98 F | HEART RATE: 85 BPM | BODY MASS INDEX: 33.82 KG/M2 | SYSTOLIC BLOOD PRESSURE: 116 MMHG | HEIGHT: 65 IN

## 2017-03-24 PROBLEM — R10.11 RUQ ABDOMINAL PAIN: Status: RESOLVED | Noted: 2017-03-22 | Resolved: 2017-03-24

## 2017-03-24 LAB
ALBUMIN SERPL BCP-MCNC: 2.8 G/DL
ALP SERPL-CCNC: 96 U/L
ALT SERPL W/O P-5'-P-CCNC: 32 U/L
ANION GAP SERPL CALC-SCNC: 9 MMOL/L
AST SERPL-CCNC: 27 U/L
BASOPHILS # BLD AUTO: 0.01 K/UL
BASOPHILS NFR BLD: 0.1 %
BILIRUB SERPL-MCNC: 0.4 MG/DL
BUN SERPL-MCNC: 8 MG/DL
CALCIUM SERPL-MCNC: 8.5 MG/DL
CHLORIDE SERPL-SCNC: 103 MMOL/L
CO2 SERPL-SCNC: 24 MMOL/L
CREAT SERPL-MCNC: 0.8 MG/DL
DIFFERENTIAL METHOD: ABNORMAL
EOSINOPHIL # BLD AUTO: 0.2 K/UL
EOSINOPHIL NFR BLD: 1.2 %
ERYTHROCYTE [DISTWIDTH] IN BLOOD BY AUTOMATED COUNT: 13.4 %
EST. GFR  (AFRICAN AMERICAN): >60 ML/MIN/1.73 M^2
EST. GFR  (NON AFRICAN AMERICAN): >60 ML/MIN/1.73 M^2
GLUCOSE SERPL-MCNC: 185 MG/DL
HCT VFR BLD AUTO: 34.5 %
HGB BLD-MCNC: 11.2 G/DL
LYMPHOCYTES # BLD AUTO: 2.6 K/UL
LYMPHOCYTES NFR BLD: 19.8 %
MCH RBC QN AUTO: 28.3 PG
MCHC RBC AUTO-ENTMCNC: 32.5 %
MCV RBC AUTO: 87 FL
MONOCYTES # BLD AUTO: 1 K/UL
MONOCYTES NFR BLD: 7.6 %
NEUTROPHILS # BLD AUTO: 9.2 K/UL
NEUTROPHILS NFR BLD: 71.3 %
PLATELET # BLD AUTO: 231 K/UL
PMV BLD AUTO: 10.6 FL
POCT GLUCOSE: 185 MG/DL (ref 70–110)
POTASSIUM SERPL-SCNC: 4.2 MMOL/L
PROT SERPL-MCNC: 7.1 G/DL
RBC # BLD AUTO: 3.96 M/UL
SODIUM SERPL-SCNC: 136 MMOL/L
WBC # BLD AUTO: 12.95 K/UL

## 2017-03-24 PROCEDURE — 63600175 PHARM REV CODE 636 W HCPCS: Performed by: SURGERY

## 2017-03-24 PROCEDURE — 25000003 PHARM REV CODE 250: Performed by: SURGERY

## 2017-03-24 PROCEDURE — 85025 COMPLETE CBC W/AUTO DIFF WBC: CPT

## 2017-03-24 PROCEDURE — 99024 POSTOP FOLLOW-UP VISIT: CPT | Mod: ,,, | Performed by: SURGERY

## 2017-03-24 PROCEDURE — 94799 UNLISTED PULMONARY SVC/PX: CPT

## 2017-03-24 PROCEDURE — 94640 AIRWAY INHALATION TREATMENT: CPT

## 2017-03-24 PROCEDURE — G0378 HOSPITAL OBSERVATION PER HR: HCPCS

## 2017-03-24 PROCEDURE — 80053 COMPREHEN METABOLIC PANEL: CPT

## 2017-03-24 PROCEDURE — 36415 COLL VENOUS BLD VENIPUNCTURE: CPT

## 2017-03-24 PROCEDURE — 94761 N-INVAS EAR/PLS OXIMETRY MLT: CPT

## 2017-03-24 PROCEDURE — 63600175 PHARM REV CODE 636 W HCPCS: Performed by: EMERGENCY MEDICINE

## 2017-03-24 RX ORDER — HYDROCODONE BITARTRATE AND ACETAMINOPHEN 10; 325 MG/1; MG/1
1 TABLET ORAL EVERY 4 HOURS PRN
Status: DISCONTINUED | OUTPATIENT
Start: 2017-03-24 | End: 2017-03-24 | Stop reason: HOSPADM

## 2017-03-24 RX ORDER — FUROSEMIDE 10 MG/ML
20 INJECTION INTRAMUSCULAR; INTRAVENOUS ONCE
Status: COMPLETED | OUTPATIENT
Start: 2017-03-24 | End: 2017-03-24

## 2017-03-24 RX ORDER — IBUPROFEN 200 MG
16 TABLET ORAL
Status: DISCONTINUED | OUTPATIENT
Start: 2017-03-24 | End: 2017-03-24 | Stop reason: HOSPADM

## 2017-03-24 RX ORDER — IBUPROFEN 200 MG
24 TABLET ORAL
Status: DISCONTINUED | OUTPATIENT
Start: 2017-03-24 | End: 2017-03-24 | Stop reason: HOSPADM

## 2017-03-24 RX ORDER — IBUPROFEN 200 MG
16 TABLET ORAL
Status: DISCONTINUED | OUTPATIENT
Start: 2017-03-24 | End: 2017-03-24

## 2017-03-24 RX ORDER — HYDROMORPHONE HYDROCHLORIDE 1 MG/ML
1 INJECTION, SOLUTION INTRAMUSCULAR; INTRAVENOUS; SUBCUTANEOUS
Status: DISCONTINUED | OUTPATIENT
Start: 2017-03-24 | End: 2017-03-24 | Stop reason: HOSPADM

## 2017-03-24 RX ORDER — GLUCAGON 1 MG
1 KIT INJECTION
Status: DISCONTINUED | OUTPATIENT
Start: 2017-03-24 | End: 2017-03-24 | Stop reason: HOSPADM

## 2017-03-24 RX ORDER — GLUCAGON 1 MG
1 KIT INJECTION
Status: DISCONTINUED | OUTPATIENT
Start: 2017-03-24 | End: 2017-03-24

## 2017-03-24 RX ORDER — DOCUSATE SODIUM 100 MG/1
100 CAPSULE, LIQUID FILLED ORAL 2 TIMES DAILY PRN
Qty: 30 CAPSULE | Refills: 0 | Status: SHIPPED | OUTPATIENT
Start: 2017-03-24 | End: 2017-04-08

## 2017-03-24 RX ORDER — IBUPROFEN 200 MG
24 TABLET ORAL
Status: DISCONTINUED | OUTPATIENT
Start: 2017-03-24 | End: 2017-03-24

## 2017-03-24 RX ORDER — HYDROCODONE BITARTRATE AND ACETAMINOPHEN 5; 325 MG/1; MG/1
1 TABLET ORAL EVERY 6 HOURS PRN
Qty: 15 TABLET | Refills: 0 | Status: SHIPPED | OUTPATIENT
Start: 2017-03-24 | End: 2017-03-28 | Stop reason: SDUPTHER

## 2017-03-24 RX ORDER — INSULIN ASPART 100 [IU]/ML
0-5 INJECTION, SOLUTION INTRAVENOUS; SUBCUTANEOUS
Status: DISCONTINUED | OUTPATIENT
Start: 2017-03-24 | End: 2017-03-24 | Stop reason: HOSPADM

## 2017-03-24 RX ADMIN — LISINOPRIL 5 MG: 5 TABLET ORAL at 08:03

## 2017-03-24 RX ADMIN — GABAPENTIN 300 MG: 300 CAPSULE ORAL at 02:03

## 2017-03-24 RX ADMIN — HYDROCODONE BITARTRATE AND ACETAMINOPHEN 1 TABLET: 10; 325 TABLET ORAL at 07:03

## 2017-03-24 RX ADMIN — HYDROMORPHONE HYDROCHLORIDE 1 MG: 2 INJECTION, SOLUTION INTRAMUSCULAR; INTRAVENOUS; SUBCUTANEOUS at 04:03

## 2017-03-24 RX ADMIN — FUROSEMIDE 20 MG: 10 INJECTION, SOLUTION INTRAMUSCULAR; INTRAVENOUS at 08:03

## 2017-03-24 RX ADMIN — ASPIRIN 81 MG: 81 TABLET, COATED ORAL at 08:03

## 2017-03-24 RX ADMIN — GABAPENTIN 300 MG: 300 CAPSULE ORAL at 05:03

## 2017-03-24 RX ADMIN — METOPROLOL SUCCINATE 50 MG: 50 TABLET, EXTENDED RELEASE ORAL at 08:03

## 2017-03-24 RX ADMIN — DOCUSATE SODIUM 100 MG: 100 CAPSULE, LIQUID FILLED ORAL at 08:03

## 2017-03-24 RX ADMIN — HYDROMORPHONE HYDROCHLORIDE 0.5 MG: 2 INJECTION, SOLUTION INTRAMUSCULAR; INTRAVENOUS; SUBCUTANEOUS at 08:03

## 2017-03-24 RX ADMIN — HYDROCODONE BITARTRATE AND ACETAMINOPHEN 1 TABLET: 10; 325 TABLET ORAL at 02:03

## 2017-03-24 RX ADMIN — IPRATROPIUM BROMIDE 0.5 MG: 0.5 SOLUTION RESPIRATORY (INHALATION) at 07:03

## 2017-03-24 RX ADMIN — AMLODIPINE BESYLATE 10 MG: 10 TABLET ORAL at 08:03

## 2017-03-24 RX ADMIN — IPRATROPIUM BROMIDE 0.5 MG: 0.5 SOLUTION RESPIRATORY (INHALATION) at 12:03

## 2017-03-24 RX ADMIN — SERTRALINE HYDROCHLORIDE 50 MG: 50 TABLET, FILM COATED ORAL at 08:03

## 2017-03-24 RX ADMIN — AMIODARONE HYDROCHLORIDE 400 MG: 200 TABLET ORAL at 08:03

## 2017-03-24 RX ADMIN — PANTOPRAZOLE SODIUM 40 MG: 40 TABLET, DELAYED RELEASE ORAL at 08:03

## 2017-03-24 RX ADMIN — APIXABAN 5 MG: 2.5 TABLET, FILM COATED ORAL at 08:03

## 2017-03-24 NOTE — NURSING
"Pt called nurse into room upset because she was 30 minutes late for his prn med. Informed pt that when i made round that he was asleep. Pt stated that the med was still late. Informed pt that his pain meds were prn. Pt stated to the nurse. "if your gonna get your feelings hurt for doing your job then dont come in here." pt education done on prn meds and rounding. Will note any changes  "

## 2017-03-24 NOTE — PLAN OF CARE
Problem: Patient Care Overview  Goal: Plan of Care Review  PT IS IND WITH GT AND WILL BE D/C TO MOBILITY PROGRAM FOR WALKING.   Outcome: Ongoing (interventions implemented as appropriate)  Pt remains free of injuries. Pain moderately controlled c PRN meds and relaxation techniques. Lasix administered per MD orders. 650 output so far. Ambulated in ascencio x1. Reports some VARGAS, but no improved SOB at rest. 12 hr chart check completed. Will continue to monitor.

## 2017-03-24 NOTE — DISCHARGE INSTRUCTIONS
[facility] General Surgery  Instructions for Patients and Families      If your incisions have:  · Glue:  You may take a bath or shower immediately and wash your skin as you normally do.  The glue will eventually crumble or peel off. Do not let your incisions soak under water.  · Strips: Leave them on, but it is OK if they fall off on their own. It is OK to get them wet 48 hours after surgery.  · Bandage: You may remove it 2 days after your surgery, and then you may leave the incision open and take a shower or bath, unless otherwise instructed. If your bandage has clear plastic, you may shower with it on and then remove it 2 days after surgery.    Activities  · Walking is recommended after surgery; bed rest is not recommended unless specifically ordered.  · If you have had abdominal surgery, do not lift over 20 pounds for 4 weeks after surgery.  · If you have had hernia surgery, do not lift over 20 pounds for 6 weeks after surgery.  · You may drive when you are off your post-operative pain medication.  · Do not smoke after surgery, it decreases your ability to heal and increases the risk of infection and pneumonia.    Diet:  Drink lots of fluids after surgery.  You might not have much of an appetite at first, you may eat regular food when you feel ready, unless you are given special diet instructions.    Post-operative symptoms and medications  · It is safe to take over-the-counter medications for constipation, heartburn, sleep, or itching if needed.  Prescription pain medication may contain acetaminophen (Tylenol), so you should not take additional acetaminophen (Tylenol) at the same time as your pain medication.  · You may experience nausea, low fever/chills, and clear drainage from your incision, sometimes up to a month after surgery.  Notify our office if you have fever over 101 degrees, worsening redness around your incision, thick cloudy drainage, or inability to drink any liquids.  · You will  "experience some level of pain after surgery.  Your pain medication should help with the pain, but may not be able to eliminate it entirely.  Pain will decrease with time, and most pain will be gone by 4 to 6 weeks after surgery.  · We are not able to call in prescriptions for pain medication after hours or on weekends.  If your pain medication is ineffective or you will run out soon and need a refill, please call our office at [phone].  We are not able to replace pain medication that has been lost or stolen.    After surgery, you will either be discharged home or admitted to the hospital.  If you are admitted to the hospital, one of the surgeons or a physician assistant will see you once a day.  Due to scheduled surgery, we may see you in the afternoon or at night; however, your nurse is able to page us at any time.  If you feel there is a situation that is not being addressed properly, please dial [phone] from the phone in your room.    Follow-up appointment  · You will see your surgeon or a physician assistant in clinic for a follow-up appointment at either our [facility] (off [facility]) or [facility] (off [facility]) locations, usually between one and four weeks after your surgery.  · The hospital nurses can make your follow up appointment, or you can make it online at [email or web address] or call [phone].  · If you have a smartphone with the Sidecar brittnee, please let us know if you would like to do a phone visit instead of a post-op office visit.    If you are signed up for [facility], install the "Sidecar" brittnee to access your test results, send messages to your doctors, and schedule appointments from your smartphone!   "

## 2017-03-24 NOTE — PLAN OF CARE
ISAK noted discharge orders and Ambulatory Referral for HH. ISAK JORGENSEN notes [facility] as patient's preference. Patient's information to [facility] via [facility].

## 2017-03-24 NOTE — PLAN OF CARE
Problem: Patient Care Overview  Goal: Plan of Care Review  PT IS IND WITH GT AND WILL BE D/C TO MOBILITY PROGRAM FOR WALKING.   Outcome: Ongoing (interventions implemented as appropriate)  Remains free from injury at this time. Respirations even and unlabored. C/o moderate pain. Prn meds given. 24 hour chart review complete. Able to verbalize needs and wants. Bed locked and in lowest position. Call light within reach. Side rails up x2. Will note any changes.

## 2017-03-24 NOTE — DISCHARGE SUMMARY
[facility] -   General Surgery  Discharge Summary      Patient Name: Crow Green  MRN: [id number]  Admission Date: 3/21/2017  Hospital Length of Stay: 0 days  Discharge Date and Time:  03/24/2017 8:40 AM  Attending Physician: Jose Delgado MD   Discharging Provider: Louis O. Jeansonne, MD  Primary Care Provider: Mateo Lambert DO    HPI:   Crow Green presented to the ED 5 days s/p lap aidan with complaint of increased right upper abdominal pain starting yesterday. The pain is moderate 7/10, and constant. He denies fever, chills, nausea, vomiting. CT shows a complex fluid collection in the gallbladder fossa.     * No surgery found *      Indwelling Lines/Drains at time of discharge:   Lines/Drains/Airways          No matching active lines, drains, or airways        Hospital Course: The patient was admitted for abdominal pain after laparoscopic cholecystectomy.  His CT scan was consistent with post op changes.  He improved with pain medication but complained of shortness of breath.  This also improved once IV fluids were discontinued.  He received one dose of lasix.  He was felt to be fit for discharge with close follow up by pulmonary and general surgery.    Consults:   Consults         Status Ordering Provider     Inpatient consult to Social Work  Once     Provider:  (Not yet assigned)    Acknowledged JOSE DELGADO          Significant Diagnostic Studies: Labs:   CMP   Recent Labs  Lab 03/23/17 0457 03/24/17  0442    136   K 4.1 4.2    103   CO2 26 24   * 185*   BUN 6 8   CREATININE 0.8 0.8   CALCIUM 8.4* 8.5*   PROT 6.8 7.1   ALBUMIN 2.7* 2.8*   BILITOT 0.6 0.4   ALKPHOS 73 96   AST 26 27   ALT 26 32   ANIONGAP 7* 9   ESTGFRAFRICA >60 >60   EGFRNONAA >60 >60    and CBC   Recent Labs  Lab 03/23/17 0457 03/24/17  0442   WBC 10.50 12.95*   HGB 11.3* 11.2*   HCT 34.5* 34.5*    231       Pending Diagnostic Studies:     None        Final Active Diagnoses:     Diagnosis Date Noted POA    Asthma [J45.909] 01/12/2017 Yes    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    Specialties:  Pulmonary Disease, Internal Medicine    Why:  shortness of breath, established patient    Contact information:    [address] [facility] [place] [address] [phone]          Follow up with Briana Blackburn PA-C On 3/28/2017.    Specialty:  General Surgery    Why:  Post-op    Contact information:    [id number] [place] [address] [address] [place] [id number] [phone]          Patient Instructions:     Diet general     Lifting restrictions   Order Comments: No lifting over 20 pounds       Medications:  Reconciled Home Medications:   Current Discharge Medication List      CONTINUE these medications which have NOT CHANGED    Details   albuterol 90 mcg/actuation inhaler Inhale 2 puffs into the lungs every 6 (six) hours as needed for Wheezing.  Qty: 18 g, Refills: 11    Associated Diagnoses: Moderate asthma      amiodarone (PACERONE) 400 MG tablet Take 1 tablet (400 mg total) by mouth once daily.  Qty: 30 tablet, Refills: 1      amlodipine (NORVASC) 10 MG tablet Take 1 tablet (10 mg total) by mouth once daily.  Qty: 90 tablet, Refills: 3    Associated Diagnoses: Hypertension associated with diabetes      aspirin (ECOTRIN) 81 MG EC tablet Take 1 tablet (81 mg total) by mouth once daily.  Refills: 0      atorvastatin (LIPITOR) 40 MG tablet Take 1 tablet (40 mg total) by mouth every evening.  Qty: 90 tablet,  Refills: 3    Associated Diagnoses: Hyperlipidemia associated with type 2 diabetes mellitus      fluticasone-salmeterol 100-50 mcg/dose (ADVAIR DISKUS) 100-50 mcg/dose diskus inhaler Inhale 1 puff into the lungs 2 (two) times daily. Controller  Qty: 180 each, Refills: 3    Associated Diagnoses: Moderate asthma      gabapentin (NEURONTIN) 300 MG capsule Take 1 capsule (300 mg total) by mouth 3 (three) times daily.  Qty: 270 capsule, Refills: 3    Associated Diagnoses: Diabetic peripheral neuropathy associated with type 2 diabetes mellitus      insulin glargine (LANTUS SOLOSTAR) 100 unit/mL (3 mL) InPn pen Inject 50 Units into the skin every evening.  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  Qty: 90 tablet, Refills: 2      metformin (GLUCOPHAGE) 1000 MG tablet Take 1 tablet (1,000 mg total) by mouth 2 (two) times daily with meals.  Qty: 180 tablet, Refills: 3    Associated Diagnoses: Type 2 diabetes mellitus with mild nonproliferative retinopathy, with long-term current use of insulin, macular edema presence unspecified, unspecified laterality      metoprolol succinate (TOPROL-XL) 50 MG 24 hr tablet TAKE 1 TABLET EVERY DAY  Qty: 30 tablet, Refills: 5      nitroGLYCERIN (NITROSTAT) 0.3 MG SL tablet Place 1 tablet (0.3 mg total) under the tongue every 5 (five) minutes as needed for Chest pain. No more than 3 tablets in one  "day.  Qty: 30 tablet, Refills: 1    Associated Diagnoses: Angina at rest      pantoprazole (PROTONIX) 40 MG tablet Take 1 tablet (40 mg total) by mouth once daily.  Qty: 30 tablet, Refills: 11    Associated Diagnoses: Gastroesophageal reflux disease, esophagitis presence not specified      sertraline (ZOLOFT) 50 MG tablet Take 1 tablet (50 mg total) by mouth once daily.  Qty: 90 tablet, Refills: 3    Associated Diagnoses: Depression, unspecified depression type      tiotropium (SPIRIVA WITH HANDIHALER) 18 mcg inhalation capsule Inhale 1 capsule (18 mcg total) into the lungs once daily. Controller  Qty: 90 capsule, Refills: 3    Associated Diagnoses: Moderate asthma      apixaban 5 mg Tab Take 1 tablet (5 mg total) by mouth 2 (two) times daily.  Qty: 60 tablet, Refills: 1      blood sugar diagnostic Strp 1 strip by Misc.(Non-Drug; Combo Route) route 6 (six) times daily.  Qty: 160 strip, Refills: 11    Comments: Patient uses Contour Next strips. He is on intensive insulin therapy and it is medically necessary to check before meals and 2 hour hours after meals as directed.      blood-glucose meter (CONTOUR NEXT EZ METER) kit Use as instructed  Qty: 1 each, Refills: 0      hydrocodone-acetaminophen 5-325mg (NORCO) 5-325 mg per tablet Take 1 tablet by mouth every 6 (six) hours as needed for Pain.  Qty: 20 tablet, Refills: 0      insulin syringe-needle U-100 1 mL 31 gauge x 5/16 Syrg       latanoprost 0.005 % ophthalmic solution Place 1 drop into both eyes every evening.  Qty: 1 Bottle, Refills: 4    Associated Diagnoses: Chronic open angle glaucoma, stage unspecified      pen needle, diabetic 31 gauge x 5/16" Ndle Inject 1 pen into the skin 2 (two) times daily. Use one 2 times a day  Qty: 100 each, Refills: 3    Associated Diagnoses: Type 2 diabetes mellitus with mild nonproliferative retinopathy, with long-term current use of insulin, macular edema presence unspecified, unspecified laterality           Time spent on the " discharge of patient: 45 minutes    Recheck CBC at follow up    Louis O. Jeansonne, MD  General Surgery  [facility]

## 2017-03-24 NOTE — PLAN OF CARE
CM placed call to [facility]-[phone], spoke to Katey Flores. Katey confirmed referral received, patient accepted and SN to visit patient in the am 3/25/17.

## 2017-03-25 NOTE — PROGRESS NOTES
Dr. Bishop notified of pt request for pain meds. He will send to preferred pharmacy. Pt notified and instructed to wean to Tylenol soon and take over the counter stool softeners.

## 2017-03-25 NOTE — PROGRESS NOTES
Instructed patient that if he took pain med right now that he would have to wait about 30 minutes before he left. Verbalized understanding. Respirations even and unlabored, no distress noted, sitting on couch

## 2017-03-28 ENCOUNTER — LAB VISIT (OUTPATIENT)
Dept: LAB | Facility: HOSPITAL | Age: 62
End: 2017-03-28
Attending: SURGERY
Payer: MEDICARE

## 2017-03-28 ENCOUNTER — OFFICE VISIT (OUTPATIENT)
Dept: PULMONOLOGY | Facility: CLINIC | Age: 62
End: 2017-03-28
Payer: MEDICARE

## 2017-03-28 ENCOUNTER — OFFICE VISIT (OUTPATIENT)
Dept: SURGERY | Facility: CLINIC | Age: 62
End: 2017-03-28
Payer: MEDICARE

## 2017-03-28 VITALS
SYSTOLIC BLOOD PRESSURE: 122 MMHG | DIASTOLIC BLOOD PRESSURE: 80 MMHG | TEMPERATURE: 98 F | WEIGHT: 196.19 LBS | HEART RATE: 68 BPM | BODY MASS INDEX: 32.65 KG/M2

## 2017-03-28 VITALS
BODY MASS INDEX: 33.13 KG/M2 | HEIGHT: 65 IN | WEIGHT: 198.88 LBS | DIASTOLIC BLOOD PRESSURE: 70 MMHG | OXYGEN SATURATION: 98 % | RESPIRATION RATE: 16 BRPM | HEART RATE: 92 BPM | SYSTOLIC BLOOD PRESSURE: 100 MMHG

## 2017-03-28 DIAGNOSIS — R10.11 RIGHT UPPER QUADRANT ABDOMINAL PAIN: ICD-10-CM

## 2017-03-28 DIAGNOSIS — G89.18 POST-OP PAIN: ICD-10-CM

## 2017-03-28 DIAGNOSIS — J45.41 MODERATE PERSISTENT ASTHMA WITH ACUTE EXACERBATION: Primary | ICD-10-CM

## 2017-03-28 DIAGNOSIS — G89.18 POST-OP PAIN: Primary | ICD-10-CM

## 2017-03-28 LAB
ALBUMIN SERPL BCP-MCNC: 3.2 G/DL
ALP SERPL-CCNC: 99 U/L
ALT SERPL W/O P-5'-P-CCNC: 37 U/L
AST SERPL-CCNC: 30 U/L
BASOPHILS # BLD AUTO: 0.01 K/UL
BASOPHILS NFR BLD: 0.1 %
BILIRUB DIRECT SERPL-MCNC: 0.2 MG/DL
BILIRUB SERPL-MCNC: 0.3 MG/DL
DIFFERENTIAL METHOD: ABNORMAL
EOSINOPHIL # BLD AUTO: 0.2 K/UL
EOSINOPHIL NFR BLD: 1.3 %
ERYTHROCYTE [DISTWIDTH] IN BLOOD BY AUTOMATED COUNT: 13.6 %
HCT VFR BLD AUTO: 40.3 %
HGB BLD-MCNC: 13 G/DL
LYMPHOCYTES # BLD AUTO: 3 K/UL
LYMPHOCYTES NFR BLD: 24.4 %
MCH RBC QN AUTO: 28.1 PG
MCHC RBC AUTO-ENTMCNC: 32.3 %
MCV RBC AUTO: 87 FL
MONOCYTES # BLD AUTO: 0.8 K/UL
MONOCYTES NFR BLD: 6.6 %
NEUTROPHILS # BLD AUTO: 8.2 K/UL
NEUTROPHILS NFR BLD: 67.4 %
PLATELET # BLD AUTO: 325 K/UL
PMV BLD AUTO: 10.9 FL
PROT SERPL-MCNC: 8.5 G/DL
RBC # BLD AUTO: 4.62 M/UL
WBC # BLD AUTO: 12.11 K/UL

## 2017-03-28 PROCEDURE — 99999 PR PBB SHADOW E&M-EST. PATIENT-LVL V: CPT | Mod: PBBFAC,,, | Performed by: NURSE PRACTITIONER

## 2017-03-28 PROCEDURE — 99999 PR PBB SHADOW E&M-EST. PATIENT-LVL IV: CPT | Mod: PBBFAC,,, | Performed by: PHYSICIAN ASSISTANT

## 2017-03-28 PROCEDURE — 3078F DIAST BP <80 MM HG: CPT | Mod: S$GLB,,, | Performed by: NURSE PRACTITIONER

## 2017-03-28 PROCEDURE — 3074F SYST BP LT 130 MM HG: CPT | Mod: S$GLB,,, | Performed by: NURSE PRACTITIONER

## 2017-03-28 PROCEDURE — 99214 OFFICE O/P EST MOD 30 MIN: CPT | Mod: 25,S$GLB,, | Performed by: NURSE PRACTITIONER

## 2017-03-28 PROCEDURE — 99024 POSTOP FOLLOW-UP VISIT: CPT | Mod: S$GLB,,, | Performed by: PHYSICIAN ASSISTANT

## 2017-03-28 PROCEDURE — 1160F RVW MEDS BY RX/DR IN RCRD: CPT | Mod: S$GLB,,, | Performed by: NURSE PRACTITIONER

## 2017-03-28 PROCEDURE — 96372 THER/PROPH/DIAG INJ SC/IM: CPT | Mod: S$GLB,,, | Performed by: NURSE PRACTITIONER

## 2017-03-28 RX ORDER — HYDROCODONE BITARTRATE AND ACETAMINOPHEN 5; 325 MG/1; MG/1
1 TABLET ORAL EVERY 6 HOURS PRN
Qty: 20 TABLET | Refills: 0 | Status: SHIPPED | OUTPATIENT
Start: 2017-03-28 | End: 2017-04-07

## 2017-03-28 RX ORDER — TRIAMCINOLONE ACETONIDE 40 MG/ML
40 INJECTION, SUSPENSION INTRA-ARTICULAR; INTRAMUSCULAR
Status: COMPLETED | OUTPATIENT
Start: 2017-03-28 | End: 2017-03-28

## 2017-03-28 RX ADMIN — TRIAMCINOLONE ACETONIDE 40 MG: 40 INJECTION, SUSPENSION INTRA-ARTICULAR; INTRAMUSCULAR at 04:03

## 2017-03-28 NOTE — PROGRESS NOTES
Crow Green is status post lap aidan and presents today for follow-up care.  He was readmitted for RUQ pain and found to have a fluid collection in gallbladder fossa. He was treated with antibiotics and his sx improved prior to d/c. He continues to pain of periumbilical and RUQ pain which is worse with movement.     PE: Abdomen is soft non-distended.  Incisions clean, dry, and intact. Tenderness around umbilicus and RUQ.     Pathology report was reviewed with the patient, which showed Gallbladder with stones:  Cholelithiasis;  Chronic cholecystitis.    A/P: s/p lap aidan - abdominal pain. Repeat CBC and LFT's. May need to repeat CT. Refill pain medication     The patient is instructed to avoid heavy lifting until 4 weeks after the surgery date.  Return to clinic as needed.

## 2017-03-28 NOTE — MR AVS SNAPSHOT
[facility]  [address] [facility] [place] [address]  Phone: [phone]  Fax: [phone]                  Crow Green   3/28/2017 2:00 PM   Office Visit    Description:  Male : 1955   Provider:  Briana Blackburn PA-C   Department:  [facility]           Reason for Visit     Post-op Evaluation           Diagnoses this Visit        Comments    Post-op pain    -  Primary     Right upper quadrant abdominal pain                To Do List           Future Appointments        Provider Department Dept Phone    3/28/2017 3:20 PM Eri Armstrong NP [facility] [phone]    3/28/2017 4:45 PM LAB, SAME DAY [facility] [phone]    2017 9:20 AM LABORATORY, [facility] [phone]    2017 9:40 AM SPECIMEN, [facility] [phone]    2017 10:20 AM Mateo Lambert DO [facility] [phone]      Goals (5 Years of Data)     None      Follow-Up and Disposition     Return if symptoms worsen or fail to improve.      [facility]     [facility] Care [facility] -  Assistance  Registered nurses in the [facility] provide clinical advisement, health education, appointment booking, and other advisory services.  Call for this free service at [phone].             Medications                Verify that the below list of medications is an accurate representation of the medications you are currently taking.  If none reported, the list may be blank. If incorrect, please contact your healthcare provider. Carry this list with you in case of emergency.           Current Medications     albuterol 90 mcg/actuation inhaler Inhale 2 puffs into the lungs every 6 (six) hours as needed for Wheezing.    amiodarone (PACERONE) 400 MG tablet Take 1 tablet (400 mg total) by mouth once daily.    amlodipine (NORVASC) 10 MG tablet Take 1 tablet (10 mg  "total) by mouth once daily.    apixaban 5 mg Tab Take 1 tablet (5 mg total) by mouth 2 (two) times daily.    aspirin (ECOTRIN) 81 MG EC tablet Take 1 tablet (81 mg total) by mouth once daily.    atorvastatin (LIPITOR) 40 MG tablet Take 1 tablet (40 mg total) by mouth every evening.    blood sugar diagnostic Strp 1 strip by Misc.(Non-Drug; Combo Route) route 6 (six) times daily.    blood-glucose meter (CONTOUR NEXT EZ METER) kit Use as instructed    docusate sodium (COLACE) 100 MG capsule Take 1 capsule (100 mg total) by mouth 2 (two) times daily as needed for Constipation.    fluticasone-salmeterol 100-50 mcg/dose (ADVAIR DISKUS) 100-50 mcg/dose diskus inhaler Inhale 1 puff into the lungs 2 (two) times daily. Controller    gabapentin (NEURONTIN) 300 MG capsule Take 1 capsule (300 mg total) by mouth 3 (three) times daily.    hydrocodone-acetaminophen 5-325mg (NORCO) 5-325 mg per tablet Take 1 tablet by mouth every 6 (six) hours as needed for Pain.    insulin glargine (LANTUS SOLOSTAR) 100 unit/mL (3 mL) InPn pen Inject 50 Units into the skin every evening.    insulin lispro (HUMALOG KWIKPEN) 100 unit/mL InPn pen 10 units subcutaneously twice times a day 10-15 min before meals    insulin syringe-needle U-100 1 mL 31 gauge x 5/16 Syrg     latanoprost 0.005 % ophthalmic solution Place 1 drop into both eyes every evening.    lisinopril (PRINIVIL,ZESTRIL) 5 MG tablet Take 1 tablet (5 mg total) by mouth once daily.    metformin (GLUCOPHAGE) 1000 MG tablet Take 1 tablet (1,000 mg total) by mouth 2 (two) times daily with meals.    metoprolol succinate (TOPROL-XL) 50 MG 24 hr tablet TAKE 1 TABLET EVERY DAY    nitroGLYCERIN (NITROSTAT) 0.3 MG SL tablet Place 1 tablet (0.3 mg total) under the tongue every 5 (five) minutes as needed for Chest pain. No more than 3 tablets in one day.    pantoprazole (PROTONIX) 40 MG tablet Take 1 tablet (40 mg total) by mouth once daily.    pen needle, diabetic 31 gauge x 5/16" Ndle Inject 1 pen " into the skin 2 (two) times daily. Use one 2 times a day    sertraline (ZOLOFT) 50 MG tablet Take 1 tablet (50 mg total) by mouth once daily.    tiotropium (SPIRIVA WITH HANDIHALER) 18 mcg inhalation capsule Inhale 1 capsule (18 mcg total) into the lungs once daily. Controller           Clinical Reference Information           Your Vitals Were     BP Pulse Temp Weight BMI    122/80 68 97.8 °F (36.6 °C) 89 kg (196 lb 3.4 oz) 32.65 kg/m2      Blood Pressure          Most Recent Value    BP  122/80      Allergies as of 3/28/2017     No Known Allergies      Immunizations Administered on Date of Encounter - 3/28/2017     None      Orders Placed During Today's Visit     Future Labs/Procedures Expected by Expires    CBC auto differential  3/28/2017 5/27/2018    Hepatic function panel  3/28/2017 5/27/2018      Language Assistance Services     ATTENTION: Language assistance services are available, free of charge. Please call [phone].      ATENCIÓN: Si habla español, tiene a garcia disposición servicios gratuitos de asistencia lingüística. Llame al [phone].     ERIKA Ý: N?u b?n nói Ti?ng Vi?t, có các d?ch v? h? tr? ngôn ng? mi?n phí dành cho b?n. G?i s? [phone].         [facility] complies with applicable Federal civil rights laws and does not discriminate on the basis of race, color, national origin, age, disability, or sex.

## 2017-03-29 NOTE — PROGRESS NOTES
"Subjective:      Patient ID: Crow Green is a 61 y.o. male.    Chief Complaint: Hospital Follow Up    HPI Comments: Patient was sleep apnea and asthma presents to the office today for evaluation of shortness of breath.  Patient with recent lap cholecystectomy and hospital readmission for fluid collection in gallbladder fossa.   He had associated symptoms of chest pain.  Cardiac evaluation was performed.  Patient also had CTA which was negative for pulmonary embolism.  Lung fields were clear.  He states his increased shortness of breath feels like asthma issues.  Bronchodilators improved since symptoms for 3-4 hours.  He is compliant with Advair and Spiriva. He states SOB for a few weeks. Labs reviewed and he is not significantly anemic.  He has some degree of pulmonary restriction due to abdominal discomfort.  He recently was placed on CPAP and having difficulties tolerating due to mask.  We will set him up with DME for a new mask fitting.       /70 (BP Location: Right arm, Patient Position: Sitting, BP Method: Manual)  Pulse 92  Resp 16  Ht 5' 5" (1.651 m)  Wt 90.2 kg (198 lb 13.7 oz)  SpO2 98%  BMI 33.09 kg/m2  Body mass index is 33.09 kg/(m^2).    Review of Systems   Constitutional: Negative.    HENT: Negative.    Respiratory: Positive for shortness of breath.    Cardiovascular: Negative.    Musculoskeletal: Negative.    Gastrointestinal: Positive for abdominal pain and abdominal distention.   Neurological: Negative.    Psychiatric/Behavioral: Negative.      Objective:      Physical Exam   Constitutional: He is oriented to person, place, and time. He appears well-developed and well-nourished.   HENT:   Head: Normocephalic and atraumatic.   Nose: Nose normal.   Mouth/Throat: Uvula is midline and oropharynx is clear and moist.   Neck: Trachea normal and normal range of motion. Neck supple. No thyroid mass and no thyromegaly present.   Cardiovascular: Normal rate, regular rhythm and normal heart " sounds.    Pulmonary/Chest: Effort normal and breath sounds normal. He has no wheezes. He has no rhonchi. He has no rales. Chest wall is not dull to percussion.   Abdominal: Soft. He exhibits no mass. There is no hepatosplenomegaly or splenomegaly. There is tenderness.   Musculoskeletal: Normal range of motion. He exhibits no edema.   Neurological: He is alert and oriented to person, place, and time.   Skin: Skin is warm and dry.   Psychiatric: He has a normal mood and affect.     Personal Diagnostic Review    Results for orders placed during the hospital encounter of 03/21/17   X-ray chest PA and lateral    Narrative Exam: Chest X-ray, two views.    History: Abdominal pain    Findings: Increased interstitial markings noted in the mid to lower lung zones, more pronounced than that identified on prior exam dated 03/19/2017 this may be related to shallow breath. Hydrostatic edema not excluded. No airspace consolidation or pleural effusion. Heart size normal.    Impression  Shallow breath. Possible ectatic edema.      Electronically signed by: SUKUMAR ANGUIANO MD  Date:     03/21/17  Time:    23:52          Assessment:       1. Moderate persistent asthma with acute exacerbation        Outpatient Encounter Prescriptions as of 3/28/2017   Medication Sig Dispense Refill    albuterol 90 mcg/actuation inhaler Inhale 2 puffs into the lungs every 6 (six) hours as needed for Wheezing. 18 g 11    amiodarone (PACERONE) 400 MG tablet Take 1 tablet (400 mg total) by mouth once daily. 30 tablet 1    amlodipine (NORVASC) 10 MG tablet Take 1 tablet (10 mg total) by mouth once daily. 90 tablet 3    apixaban 5 mg Tab Take 1 tablet (5 mg total) by mouth 2 (two) times daily. 60 tablet 1    aspirin (ECOTRIN) 81 MG EC tablet Take 1 tablet (81 mg total) by mouth once daily.  0    atorvastatin (LIPITOR) 40 MG tablet Take 1 tablet (40 mg total) by mouth every evening. 90 tablet 3    blood sugar diagnostic Strp 1 strip by Misc.(Non-Drug;  "Combo Route) route 6 (six) times daily. 160 strip 11    blood-glucose meter (CONTOUR NEXT EZ METER) kit Use as instructed 1 each 0    docusate sodium (COLACE) 100 MG capsule Take 1 capsule (100 mg total) by mouth 2 (two) times daily as needed for Constipation. 30 capsule 0    fluticasone-salmeterol 100-50 mcg/dose (ADVAIR DISKUS) 100-50 mcg/dose diskus inhaler Inhale 1 puff into the lungs 2 (two) times daily. Controller 180 each 3    gabapentin (NEURONTIN) 300 MG capsule Take 1 capsule (300 mg total) by mouth 3 (three) times daily. 270 capsule 3    hydrocodone-acetaminophen 5-325mg (NORCO) 5-325 mg per tablet Take 1 tablet by mouth every 6 (six) hours as needed for Pain. 20 tablet 0    insulin glargine (LANTUS SOLOSTAR) 100 unit/mL (3 mL) InPn pen Inject 50 Units into the skin every evening. 2 Box 11    insulin lispro (HUMALOG KWIKPEN) 100 unit/mL InPn pen 10 units subcutaneously twice times a day 10-15 min before meals 1 Box 11    insulin syringe-needle U-100 1 mL 31 gauge x 5/16 Syrg       latanoprost 0.005 % ophthalmic solution Place 1 drop into both eyes every evening. 1 Bottle 4    lisinopril (PRINIVIL,ZESTRIL) 5 MG tablet Take 1 tablet (5 mg total) by mouth once daily. 90 tablet 2    metformin (GLUCOPHAGE) 1000 MG tablet Take 1 tablet (1,000 mg total) by mouth 2 (two) times daily with meals. 180 tablet 3    metoprolol succinate (TOPROL-XL) 50 MG 24 hr tablet TAKE 1 TABLET EVERY DAY 30 tablet 5    nitroGLYCERIN (NITROSTAT) 0.3 MG SL tablet Place 1 tablet (0.3 mg total) under the tongue every 5 (five) minutes as needed for Chest pain. No more than 3 tablets in one day. 30 tablet 1    pantoprazole (PROTONIX) 40 MG tablet Take 1 tablet (40 mg total) by mouth once daily. 30 tablet 11    pen needle, diabetic 31 gauge x 5/16" Ndle Inject 1 pen into the skin 2 (two) times daily. Use one 2 times a day 100 each 3    sertraline (ZOLOFT) 50 MG tablet Take 1 tablet (50 mg total) by mouth once daily. 90 " tablet 3    tiotropium (SPIRIVA WITH HANDIHALER) 18 mcg inhalation capsule Inhale 1 capsule (18 mcg total) into the lungs once daily. Controller 90 capsule 3    [DISCONTINUED] hydrocodone-acetaminophen 5-325mg (NORCO) 5-325 mg per tablet Take 1 tablet by mouth every 6 (six) hours as needed for Pain. 20 tablet 0    [DISCONTINUED] hydrocodone-acetaminophen 5-325mg (NORCO) 5-325 mg per tablet Take 1 tablet by mouth every 6 (six) hours as needed for Pain. 15 tablet 0     Facility-Administered Encounter Medications as of 3/28/2017   Medication Dose Route Frequency Provider Last Rate Last Dose    [COMPLETED] triamcinolone acetonide injection 40 mg  40 mg Intramuscular 1 time in Clinic/HOD Elizabeth Lejeune, NP   40 mg at 03/28/17 1602     No orders of the defined types were placed in this encounter.    Plan:   He has some degree of pulmonary restriction secondary to abdominal discomfort.  He also has increased asthma symptoms which she states has been ongoing for the last few weeks.  Continue current pulmonary regimen.  Kenalog 40 mg IM now.  ED if symptoms worsen.  Pulmonary embolism ruled out.  Cardiac workup negative

## 2017-04-03 ENCOUNTER — PATIENT OUTREACH (OUTPATIENT)
Dept: ADMINISTRATIVE | Facility: HOSPITAL | Age: 62
End: 2017-04-03

## 2017-04-03 DIAGNOSIS — Z11.59 NEED FOR HEPATITIS C SCREENING TEST: Primary | ICD-10-CM

## 2017-04-03 NOTE — LETTER
April 3, 2017    Crow Green  [address]  [facility] [address]             [facility]  [address]  [facility] [address]  Phone: [phone] Dear Mr. Green:    [facility] is committed to your overall health.  To help you get the most out of each of your visits, we will review your information to make sure you are up to date on all of your recommended tests and/or procedures.      Mateo Lambert DO has found that you may be due for   Health Maintenance Due   Topic    Hepatitis C Screening     TETANUS VACCINE     Colonoscopy         If you have had any of the above done at another facility, please bring the records or information with you so that your record at [facility] will be complete.    If you are currently taking medication, please bring it with you to your appointment for review.    We will be happy to assist you with scheduling any necessary appointments or you may contact the [facility] appointment desk at [phone] to schedule at your convenience.     Thank you for choosing [facility] for your healthcare needs.  If you have any questions or concerns, please don't hesitate to call.    Sincerely,  Linda WHELAN LPN Care Coordinator  [facility]

## 2017-04-18 ENCOUNTER — OFFICE VISIT (OUTPATIENT)
Dept: PULMONOLOGY | Facility: CLINIC | Age: 62
End: 2017-04-18
Payer: MEDICARE

## 2017-04-18 ENCOUNTER — CLINICAL SUPPORT (OUTPATIENT)
Dept: CARDIOLOGY | Facility: CLINIC | Age: 62
End: 2017-04-18
Payer: MEDICARE

## 2017-04-18 VITALS
HEIGHT: 65 IN | WEIGHT: 196.63 LBS | HEART RATE: 98 BPM | SYSTOLIC BLOOD PRESSURE: 136 MMHG | BODY MASS INDEX: 32.76 KG/M2 | RESPIRATION RATE: 18 BRPM | DIASTOLIC BLOOD PRESSURE: 88 MMHG | OXYGEN SATURATION: 98 %

## 2017-04-18 DIAGNOSIS — G47.33 OSA ON CPAP: ICD-10-CM

## 2017-04-18 DIAGNOSIS — R10.13 ACUTE EPIGASTRIC PAIN: ICD-10-CM

## 2017-04-18 DIAGNOSIS — J45.40 ASTHMA, MODERATE PERSISTENT, WELL-CONTROLLED: Primary | ICD-10-CM

## 2017-04-18 PROCEDURE — 99213 OFFICE O/P EST LOW 20 MIN: CPT | Mod: S$GLB,,, | Performed by: NURSE PRACTITIONER

## 2017-04-18 PROCEDURE — 3079F DIAST BP 80-89 MM HG: CPT | Mod: S$GLB,,, | Performed by: NURSE PRACTITIONER

## 2017-04-18 PROCEDURE — 99999 PR PBB SHADOW E&M-EST. PATIENT-LVL IV: CPT | Mod: PBBFAC,,, | Performed by: NURSE PRACTITIONER

## 2017-04-18 PROCEDURE — 93000 ELECTROCARDIOGRAM COMPLETE: CPT | Mod: S$GLB,,, | Performed by: INTERNAL MEDICINE

## 2017-04-18 PROCEDURE — 1160F RVW MEDS BY RX/DR IN RCRD: CPT | Mod: S$GLB,,, | Performed by: NURSE PRACTITIONER

## 2017-04-18 PROCEDURE — 3075F SYST BP GE 130 - 139MM HG: CPT | Mod: S$GLB,,, | Performed by: NURSE PRACTITIONER

## 2017-04-18 NOTE — MR AVS SNAPSHOT
[facility] - Pulmonary Services  [address] [facility] [place] [address]  Phone: [phone]  Fax: [phone]                  Crow Green   2017 9:00 AM   Office Visit    Description:  Male : 1955   Provider:  Eri Armstrong NP   Department:  [facility] - Pulmonary Services           Reason for Visit     Sleep Apnea           Diagnoses this Visit        Comments    Asthma, moderate persistent, well-controlled    -  Primary on current medication, advair, spirvia and proventil inhaler.     SANDRITA on CPAP     benefits, not compliant r/t mask. encouraged appt with dme for mask fitting. cont CPAP 14 cm nightly.     Acute epigastric pain     resume protonix, awaiting in mail from pharmacy, substitute prilosec otc until obtains, follow gerd diet and precautions.            To Do List           Future Appointments        Provider Department Dept Phone    2017 9:20 [phone]    2017 8:40 AM Barb Veras DPM [facility] Pod[facility] [phone]    2017 9:30 AM Selene Fishman Jr., PA-C [facility] - Diabetes Management [phone]    2017 8:15 AM [facility] [phone]    2017 9:00 AM Eri Armstrong NP [facility] Pulmonary Services [phone]      Goals (5 Years of Data)     None      Follow-Up and Disposition     Return in about 6 weeks (around 2017) for CPAP compliance follow up.      [facility]     [facility] On Call Nurse Care Line - / Assistance  Unless otherwise directed by your provider, please contact [facility] On-[facility], our nurse care line that is available for  assistance.     Registered nurses in the [facility] Center provide: appointment scheduling, clinical advisement, health education, and other advisory services.  Call: [phone] (toll free)               Medications           STOP taking these medications     albuterol 90 mcg/actuation inhaler Inhale 2 puffs into the lungs every 6 (six)  hours as needed for Wheezing.           Verify that the below list of medications is an accurate representation of the medications you are currently taking.  If none reported, the list may be blank. If incorrect, please contact your healthcare provider. Carry this list with you in case of emergency.           Current Medications     amiodarone (PACERONE) 400 MG tablet Take 1 tablet (400 mg total) by mouth once daily.    amlodipine (NORVASC) 10 MG tablet Take 1 tablet (10 mg total) by mouth once daily.    apixaban 5 mg Tab Take 1 tablet (5 mg total) by mouth 2 (two) times daily.    aspirin (ECOTRIN) 81 MG EC tablet Take 1 tablet (81 mg total) by mouth once daily.    atorvastatin (LIPITOR) 40 MG tablet Take 1 tablet (40 mg total) by mouth every evening.    blood sugar diagnostic Strp 1 strip by Misc.(Non-Drug; Combo Route) route 6 (six) times daily.    blood-glucose meter (CONTOUR NEXT EZ METER) kit Use as instructed    fluticasone-salmeterol 100-50 mcg/dose (ADVAIR DISKUS) 100-50 mcg/dose diskus inhaler Inhale 1 puff into the lungs 2 (two) times daily. Controller    gabapentin (NEURONTIN) 300 MG capsule Take 1 capsule (300 mg total) by mouth 3 (three) times daily.    insulin glargine (LANTUS SOLOSTAR) 100 unit/mL (3 mL) InPn pen Inject 50 Units into the skin every evening.    insulin lispro (HUMALOG KWIKPEN) 100 unit/mL InPn pen 10 units subcutaneously twice times a day 10-15 min before meals    insulin syringe-needle U-100 1 mL 31 gauge x 5/16 Syrg     latanoprost 0.005 % ophthalmic solution Place 1 drop into both eyes every evening.    lisinopril (PRINIVIL,ZESTRIL) 5 MG tablet Take 1 tablet (5 mg total) by mouth once daily.    metformin (GLUCOPHAGE) 1000 MG tablet Take 1 tablet (1,000 mg total) by mouth 2 (two) times daily with meals.    metoprolol succinate (TOPROL-XL) 50 MG 24 hr tablet TAKE 1 TABLET EVERY DAY    nitroGLYCERIN (NITROSTAT) 0.3 MG SL tablet Place 1 tablet (0.3 mg total) under the tongue every 5  "(five) minutes as needed for Chest pain. No more than 3 tablets in one day.    pantoprazole (PROTONIX) 40 MG tablet Take 1 tablet (40 mg total) by mouth once daily.    pen needle, diabetic 31 gauge x 5/16" Ndle Inject 1 pen into the skin 2 (two) times daily. Use one 2 times a day    sertraline (ZOLOFT) 50 MG tablet Take 1 tablet (50 mg total) by mouth once daily.    tiotropium (SPIRIVA WITH HANDIHALER) 18 mcg inhalation capsule Inhale 1 capsule (18 mcg total) into the lungs once daily. Controller           Clinical Reference Information           Your Vitals Were     BP Pulse Resp Height Weight SpO2    136/88 (BP Location: Right arm, Patient Position: Sitting, BP Method: Manual) 98 18 5' 5" (1.651 m) 89.2 kg (196 lb 10.4 oz) 98%    BMI                32.72 kg/m2          Blood Pressure          Most Recent Value    BP  136/88      Allergies as of 4/18/2017     No Known Allergies      Immunizations Administered on Date of Encounter - 4/18/2017     None      Orders Placed During Today's Visit     Future Labs/Procedures Expected by Expires    EKG 12-lead  4/18/2017 4/18/2018      Language Assistance Services     ATTENTION: Language assistance services are available, free of charge. Please call [phone].      ATENCIÓN: Si guy victoria, tiene a garcia disposición servicios gratuitos de asistencia lingüística. Llame al [phone].     CHÚ Ý: N?u b?n nói Ti?ng Vi?t, có các d?ch v? h? tr? ngôn ng? mi?n phí dành cho b?n. G?i s? [phone].         [facility] complies with applicable Federal civil rights laws and does not discriminate on the basis of race, color, national origin, age, disability, or sex.        "

## 2017-04-18 NOTE — PROGRESS NOTES
"Subjective:      Patient ID: Crow Green is a 61 y.o. male.  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    Chief Complaint: Sleep Apnea    HPI: Crow Green is  here for follow up for SANDRITA and CPAP complaince assessment. He is on CPAP  of 14 cmH2O pressure. He is not compliant with CPAP use. Complaince download today reveals 0.0% of days with greater than 4 hours of device use. Patient reports benefit when CPAP use, however he reports when he turns on his side the mask slides out of place and then leaks, "sometimes repositions mask other times takes mask off for rest of night" reports once awake often will not go back to sleep, some nights up at 2- 3 am for rest of morning.  He reports if sleeping on his back he is comfortable with CPAP mask and air flow, problem is when he "tosses and turns" in bed.     Asthma  Patient reports his asthma is presently well controlled on advair, spiriva and albuterol.     Patient reports he lost some of his medication while traveling and is awaiting refills from his pharmacy today or tomorrow.     Previous Report " Reviewed: lab reports, office notes and radiology reports     The following portions of the patient's history were reviewed and updated as appropriate: allergies, current medications, past family history, past medical history, past social history, past surgical history and problem list.    Review of Systems   Constitutional: Negative for fever, chills, weight loss, weight gain, activity change, appetite change, fatigue and night sweats.   HENT: Negative for postnasal drip, rhinorrhea, sinus pressure, voice change and congestion.    Eyes: Negative for redness and itching.   Respiratory: Negative for snoring, cough, sputum production, chest tightness, shortness of breath, wheezing, orthopnea, asthma nighttime symptoms, dyspnea on extertion, use of rescue inhaler and somnolence.    Cardiovascular: Negative for chest pain, palpitations and leg swelling.   Genitourinary: Negative for difficulty urinating and hematuria.   Endocrine: Negative for polydipsia, polyphagia, cold intolerance, heat intolerance and polyuria.    Musculoskeletal: Negative for arthralgias, back pain, gait problem, joint swelling and myalgias.   Skin: Negative.    Gastrointestinal: Positive for acid reflux (lost protonix, has reordered and took zantac last night ). Negative for nausea, vomiting and abdominal pain.   Neurological: Negative for dizziness, weakness, light-headedness and headaches.   Hematological: Negative for adenopathy. No excessive bruising.   Psychiatric/Behavioral: Negative for sleep disturbance. The patient is not nervous/anxious.       Objective:     Physical Exam   Constitutional: He is oriented to person, place, and time. Vital signs are normal. He appears well-developed and well-nourished. He is active and cooperative.  Non-toxic appearance. He does not appear ill. No distress.   HENT:   Head: Normocephalic and atraumatic.   Right Ear: External ear normal.   Left Ear: External ear normal.   Nose: Nose normal.   Mouth/Throat:  "Oropharynx is clear and moist.   Eyes: Conjunctivae are normal.   Neck: Normal range of motion. Neck supple.   Cardiovascular: Normal rate, regular rhythm, normal heart sounds and intact distal pulses.    Pulmonary/Chest: Effort normal and breath sounds normal. He has no wheezes. He has no rales.   Abdominal: Soft. Bowel sounds are normal.   Musculoskeletal: Normal range of motion.   Neurological: He is alert and oriented to person, place, and time. He has normal reflexes.   Skin: Skin is warm and dry.   Psychiatric: He has a normal mood and affect. His behavior is normal. Judgment and thought content normal.   Vitals reviewed.    Vitals:    04/18/17 0823   BP: 136/88   Pulse: 98   Resp: 18   SpO2: 98%   Weight: 89.2 kg (196 lb 10.4 oz)   Height: 5' 5" (1.651 m)   PainSc:   8   PainLoc: Chest     body mass index is 32.72 kg/(m^2).    Personal Diagnostic Review    CPAP download  CPAP 14 cm  2/13/2017 to 3/14/2017  13 days used  Percentage days device used 43.3%  Average usage all days  0hrs 5 mins  Average usage days used 0 hr 12 mins  Percentage used greater than 4 hrs was  0.0%  AHI 4.3     Patient benefits when cpap in use however he is not compliant related to mask not staying in place when turning on side to sleep.     Assessment:     1. Asthma, moderate persistent, well-controlled    2. SANDRITA on CPAP    3. Acute epigastric pain        Orders Placed This Encounter   Procedures    EKG 12-lead     Standing Status:   Future     Number of Occurrences:   1     Standing Expiration Date:   4/18/2018       Plan:     Crow was seen today for sleep apnea.    Diagnoses and all orders for this visit:    Asthma, moderate persistent, well-controlled  Comments:  on current medication, advair, spirvia and proventil inhaler.     SNADRITA on CPAP  Comments:  benefits, not compliant r/t mask. encouraged appt with dme for mask fitting. cont CPAP 14 cm nightly.     Acute epigastric pain  Comments:  resume protonix, awaiting in mail from " pharmacy, sub. prilosec otc until obtains, follow gerd diet and precautions. EKG stable whe compared to prior 3/18/17  Orders:  -     EKG 12-lead; Future      Continue  CPAP of 14 CMS. ([facility] - [facility]). Reviewed therapeutic goals for positive airway pressure therapy CPAP  Ideal is usage 100% of nights for 6 - 8 hours per night. Minimum usage is 70% of night for at least 4 hours per night used. Pateint expressed understanding.     Return in about 6 weeks (around 5/30/2017) for CPAP compliance follow up r/t non compliance at last visit w/cpap use..

## 2017-04-19 ENCOUNTER — OFFICE VISIT (OUTPATIENT)
Dept: DIABETES | Facility: CLINIC | Age: 62
End: 2017-04-19
Payer: MEDICARE

## 2017-04-19 ENCOUNTER — OFFICE VISIT (OUTPATIENT)
Dept: PODIATRY | Facility: CLINIC | Age: 62
End: 2017-04-19
Payer: MEDICARE

## 2017-04-19 VITALS
DIASTOLIC BLOOD PRESSURE: 83 MMHG | BODY MASS INDEX: 33.24 KG/M2 | WEIGHT: 199.5 LBS | HEIGHT: 65 IN | SYSTOLIC BLOOD PRESSURE: 142 MMHG | HEART RATE: 99 BPM

## 2017-04-19 VITALS
HEIGHT: 65 IN | BODY MASS INDEX: 33.13 KG/M2 | DIASTOLIC BLOOD PRESSURE: 84 MMHG | SYSTOLIC BLOOD PRESSURE: 148 MMHG | WEIGHT: 198.88 LBS

## 2017-04-19 DIAGNOSIS — E11.49 TYPE II DIABETES MELLITUS WITH NEUROLOGICAL MANIFESTATIONS: Primary | ICD-10-CM

## 2017-04-19 DIAGNOSIS — B35.1 DERMATOPHYTOSIS OF NAIL: ICD-10-CM

## 2017-04-19 PROBLEM — Z79.4 CONTROLLED TYPE 2 DIABETES MELLITUS WITH HYPERGLYCEMIA, WITH LONG-TERM CURRENT USE OF INSULIN: Status: RESOLVED | Noted: 2017-03-17 | Resolved: 2017-04-19

## 2017-04-19 PROBLEM — E11.65 CONTROLLED TYPE 2 DIABETES MELLITUS WITH HYPERGLYCEMIA, WITH LONG-TERM CURRENT USE OF INSULIN: Status: RESOLVED | Noted: 2017-03-17 | Resolved: 2017-04-19

## 2017-04-19 LAB — GLUCOSE SERPL-MCNC: 397 MG/DL (ref 70–110)

## 2017-04-19 PROCEDURE — 3079F DIAST BP 80-89 MM HG: CPT | Mod: S$GLB,,, | Performed by: PODIATRIST

## 2017-04-19 PROCEDURE — 99999 PR PBB SHADOW E&M-EST. PATIENT-LVL IV: CPT | Mod: PBBFAC,,, | Performed by: PHYSICIAN ASSISTANT

## 2017-04-19 PROCEDURE — 1160F RVW MEDS BY RX/DR IN RCRD: CPT | Mod: S$GLB,,, | Performed by: PHYSICIAN ASSISTANT

## 2017-04-19 PROCEDURE — 4010F ACE/ARB THERAPY RXD/TAKEN: CPT | Mod: S$GLB,,, | Performed by: PODIATRIST

## 2017-04-19 PROCEDURE — 2022F DILAT RTA XM EVC RTNOPTHY: CPT | Mod: S$GLB,,, | Performed by: PHYSICIAN ASSISTANT

## 2017-04-19 PROCEDURE — 3045F PR MOST RECENT HEMOGLOBIN A1C LEVEL 7.0-9.0%: CPT | Mod: S$GLB,,, | Performed by: PODIATRIST

## 2017-04-19 PROCEDURE — 3077F SYST BP >= 140 MM HG: CPT | Mod: S$GLB,,, | Performed by: PHYSICIAN ASSISTANT

## 2017-04-19 PROCEDURE — 4010F ACE/ARB THERAPY RXD/TAKEN: CPT | Mod: S$GLB,,, | Performed by: PHYSICIAN ASSISTANT

## 2017-04-19 PROCEDURE — 11721 DEBRIDE NAIL 6 OR MORE: CPT | Mod: Q9,S$GLB,, | Performed by: PODIATRIST

## 2017-04-19 PROCEDURE — 3045F PR MOST RECENT HEMOGLOBIN A1C LEVEL 7.0-9.0%: CPT | Mod: S$GLB,,, | Performed by: PHYSICIAN ASSISTANT

## 2017-04-19 PROCEDURE — 82948 REAGENT STRIP/BLOOD GLUCOSE: CPT | Mod: S$GLB,,, | Performed by: PHYSICIAN ASSISTANT

## 2017-04-19 PROCEDURE — 99214 OFFICE O/P EST MOD 30 MIN: CPT | Mod: S$GLB,,, | Performed by: PHYSICIAN ASSISTANT

## 2017-04-19 PROCEDURE — 1160F RVW MEDS BY RX/DR IN RCRD: CPT | Mod: S$GLB,,, | Performed by: PODIATRIST

## 2017-04-19 PROCEDURE — 3079F DIAST BP 80-89 MM HG: CPT | Mod: S$GLB,,, | Performed by: PHYSICIAN ASSISTANT

## 2017-04-19 PROCEDURE — 99499 UNLISTED E&M SERVICE: CPT | Mod: S$GLB,,, | Performed by: PODIATRIST

## 2017-04-19 PROCEDURE — 3077F SYST BP >= 140 MM HG: CPT | Mod: S$GLB,,, | Performed by: PODIATRIST

## 2017-04-19 PROCEDURE — 99999 PR PBB SHADOW E&M-EST. PATIENT-LVL III: CPT | Mod: PBBFAC,,, | Performed by: PODIATRIST

## 2017-04-19 NOTE — PROGRESS NOTES
Subjective:      Patient ID: Crow Green is a 61 y.o. male.    PCP: Mateo Lambert DO      Crow Green is a pleasant 61 y.o. male presenting to follow up on diabetes mellitus. He has had diabetes for 17 or more years. His last visit in Diabetes Management was 1/18/2017 Since that time he has had no improvement in his glycemia. His blood sugar range fasting has been not checking and 2 hour post meal has been not checking, and he has been monitoring not checking times per day as directed. His current concerns are glycemic control and cough productive of green mucous, without fever.  Been relatively noncompliant with his medication as well as his lifestyle modifications and changes as well.  He presents today after hospital admission for gallbladder surgery with poor control and a random blood sugar of 397.    He reports hospital admissions for gallbladder with surgery, emergency room visits after discharge because of excessive fluid, he denies hypoglycemia, syncope, diaphoresis, chest pain.    He has gained 1 pounds since last visit. His BMI is 33.2    His blood sugar in the clinic today was:   Lab Results   Component Value Date    POCGLU 397 (A) 04/19/2017     We discussed the American diabetes Association recommendations:  hemoglobin A1c below 7.0%; all diabetics should be on statins unless contraindicated; one aspirin daily unless contraindicated; fasting blood sugar between 80 and 130 mg/dL; postprandial blood sugar below 180 mg/dl; prevention of hypoglycemia, may adjust goals to higher levels if persistent; ACE or ARB therapy if not contraindicated; and maintain in an ideal body weight with BMI below 25.    Crow is noncompliant some of the time with DM medications.     Crow is noncompliant some of the time with lifestyle modifications to include activity and meal planning.       STANDARDS OF CARE:  Eye doctor: Dr. Sims, last exam Last year.  Dental exam: Recommend regular exams; denies  gums bleeding.  Podiatry doctor:     ACTIVITY LEVEL: He exercises rarely.  MEAL PLANNING: Number of meals per day - 3. Number of snacks per day - 2.  Per dietary recall, patient is not limiting carbohydrates, saturated fats and sodium.   BLOOD GLUCOSE TESTING: Self-monitoring with   SOCIAL HISTORY: . Lives with spouse. retired no tobacco use    The following results were reviewed with patient.    Lab Results   Component Value Date    WBC 12.11 03/28/2017    HGB 13.0 (L) 03/28/2017    HCT 40.3 03/28/2017     03/28/2017    CHOL 189 11/16/2016    TRIG 134 11/16/2016    HDL 42 11/16/2016    ALT 37 03/28/2017    AST 30 03/28/2017     03/24/2017    K 4.2 03/24/2017     03/24/2017    CREATININE 0.8 03/24/2017    ESTGFRAFRICA >60 03/24/2017    EGFRNONAA >60 03/24/2017    BUN 8 03/24/2017    CO2 24 03/24/2017    TSH 1.345 12/07/2016    PSA 0.83 01/18/2013    INR 1.0 03/15/2017     (H) 03/24/2017       Lab Results   Component Value Date    HGBA1C 8.8 (H) 03/16/2017    HGBA1C 11.7 (H) 12/07/2016    HGBA1C 12.9 (H) 11/16/2016       Lab Results   Component Value Date    GLUTAMICACID 0.00 12/07/2016    CPEPTIDE 5.1 12/07/2016     Lab Results   Component Value Date    TSH 1.345 12/07/2016     Lab Results   Component Value Date    TOTALTESTOST 182 (L) 01/18/2013     Lab Results   Component Value Date    TESTOSTERONE 213 (L) 12/04/2014    TESTOSTERONE 35.9 (L) 12/04/2014     Lab Results   Component Value Date    FERRITIN 505 (H) 01/12/2017     Lab Results   Component Value Date    PTH 95.0 (H) 12/04/2014    CALCIUM 8.5 (L) 03/24/2017    PHOS 2.6 (L) 11/16/2016           Review of patient's allergies indicates:  No Known Allergies    Past Medical History:   Diagnosis Date    Arthritis     Asthma     Depression     Diabetes mellitus     Diabetes mellitus, type 2 2000    Elevated PSA     Hypertension        Review of Systems   Constitutional: Negative.  Negative for activity change, appetite  change, chills, diaphoresis, fatigue, fever and unexpected weight change.   HENT: Negative.  Negative for dental problem, facial swelling, hearing loss, nosebleeds, trouble swallowing and voice change.    Eyes: Negative.  Negative for photophobia, pain, discharge, redness, itching and visual disturbance.   Respiratory: Negative.  Negative for cough, choking, chest tightness, shortness of breath and wheezing.    Cardiovascular: Negative.  Negative for chest pain, palpitations and leg swelling.   Gastrointestinal: Negative.  Negative for abdominal distention, abdominal pain, blood in stool, constipation, diarrhea, nausea and vomiting.   Endocrine: Negative.  Negative for cold intolerance, heat intolerance, polydipsia, polyphagia and polyuria.   Genitourinary: Negative.  Negative for decreased urine volume, difficulty urinating, dysuria, frequency, scrotal swelling, testicular pain and urgency.   Musculoskeletal: Negative.  Negative for arthralgias, back pain, gait problem, joint swelling, myalgias, neck pain and neck stiffness.   Skin: Negative.  Negative for color change, pallor, rash and wound.   Allergic/Immunologic: Negative.  Negative for environmental allergies, food allergies and immunocompromised state.   Neurological: Negative.  Negative for dizziness, tremors, seizures, syncope, facial asymmetry, speech difficulty, weakness, light-headedness, numbness and headaches.   Hematological: Negative.  Negative for adenopathy. Does not bruise/bleed easily.   Psychiatric/Behavioral: Negative.  Negative for agitation, behavioral problems, confusion, decreased concentration, dysphoric mood, hallucinations, self-injury, sleep disturbance and suicidal ideas. The patient is not nervous/anxious and is not hyperactive.       Objective:     Vitals - 1 value per visit 4/18/2017 4/19/2017 4/19/2017   SYSTOLIC 136 148 142   DIASTOLIC 88 84 83   PULSE 98 - 99   TEMPERATURE - - -   RESPIRATIONS 18 - -   SPO2 98 - -   Weight (lb)  "196.65 198.86 199.52   Weight (kg) 89.2 90.2 90.5   HEIGHT 5' 5" 5' 5" 5' 5"   BODY MASS INDEX 32.72 33.09 33.2   VISIT REPORT - - -   Pain Score  8 - 0       Physical Exam   Constitutional: He is oriented to person, place, and time. He appears well-developed and well-nourished. He is cooperative.  Non-toxic appearance. He does not have a sickly appearance. He does not appear ill. No distress. He is not intubated.   HENT:   Head: Normocephalic and atraumatic. Not macrocephalic and not microcephalic. Head is without raccoon's eyes, without Summers's sign, without abrasion, without contusion, without laceration, without right periorbital erythema and without left periorbital erythema. Hair is normal.   Right Ear: External ear normal. No lacerations. No drainage, swelling or tenderness. No foreign bodies. No mastoid tenderness. Tympanic membrane is not injected, not scarred, not perforated, not erythematous, not retracted and not bulging. Tympanic membrane mobility is normal. No middle ear effusion. No hemotympanum. No decreased hearing is noted.   Left Ear: External ear normal. No lacerations. No drainage, swelling or tenderness. No foreign bodies. No mastoid tenderness. Tympanic membrane is not injected, not scarred, not perforated, not erythematous, not retracted and not bulging. Tympanic membrane mobility is normal.  No middle ear effusion. No hemotympanum. No decreased hearing is noted.   Nose: Nose normal.   Mouth/Throat: Oropharynx is clear and moist.   Eyes: EOM and lids are normal. Pupils are equal, round, and reactive to light. Right eye exhibits no chemosis, no discharge, no exudate and no hordeolum. No foreign body present in the right eye. Left eye exhibits no chemosis, no discharge, no exudate and no hordeolum. No foreign body present in the left eye. Right conjunctiva is not injected. Right conjunctiva has no hemorrhage. Left conjunctiva is not injected. Left conjunctiva has no hemorrhage. No scleral " icterus. Right eye exhibits normal extraocular motion and no nystagmus. Left eye exhibits normal extraocular motion and no nystagmus. Right pupil is round and reactive. Left pupil is round and reactive. Pupils are equal.   Neck: Normal range of motion, full passive range of motion without pain and phonation normal. Neck supple. Normal carotid pulses, no hepatojugular reflux and no JVD present. No tracheal tenderness, no spinous process tenderness and no muscular tenderness present. Carotid bruit is not present. No rigidity. No tracheal deviation, no edema, no erythema and normal range of motion present. No thyroid mass and no thyromegaly present.   Cardiovascular: Normal rate, regular rhythm, normal heart sounds and intact distal pulses.   No extrasystoles are present. PMI is not displaced.  Exam reveals no gallop, no friction rub and no decreased pulses.    No murmur heard.  Pulses:       Carotid pulses are 2+ on the right side, and 2+ on the left side.       Radial pulses are 2+ on the right side, and 2+ on the left side.        Dorsalis pedis pulses are 2+ on the right side, and 2+ on the left side.        Posterior tibial pulses are 2+ on the right side, and 2+ on the left side.   Pulmonary/Chest: Effort normal and breath sounds normal. No accessory muscle usage or stridor. No apnea, no tachypnea and no bradypnea. He is not intubated. No respiratory distress. He has no decreased breath sounds. He has no wheezes. He has no rhonchi. He has no rales. He exhibits no tenderness.   Abdominal: Soft. Bowel sounds are normal. He exhibits no shifting dullness, no distension, no pulsatile liver, no fluid wave, no abdominal bruit, no ascites, no pulsatile midline mass and no mass. There is no hepatosplenomegaly, splenomegaly or hepatomegaly. There is no tenderness. There is no rigidity, no rebound, no guarding, no CVA tenderness and no tenderness at McBurney's point. No hernia. Hernia confirmed negative in the ventral area.    Musculoskeletal: Normal range of motion. He exhibits no edema or tenderness.        Right foot: There is normal range of motion and no deformity.        Left foot: There is normal range of motion and no deformity.   Feet:   Right Foot:   Protective Sensation: 6 sites tested. 6 sites sensed.   Skin Integrity: Negative for ulcer, blister, skin breakdown, erythema, warmth, callus or dry skin.   Left Foot:   Protective Sensation: 6 sites tested. 6 sites sensed.   Skin Integrity: Negative for ulcer, blister, skin breakdown, erythema, warmth, callus or dry skin.   Lymphadenopathy:        Head (right side): No submental, no submandibular, no tonsillar, no preauricular, no posterior auricular and no occipital adenopathy present.        Head (left side): No submental, no submandibular, no tonsillar, no preauricular, no posterior auricular and no occipital adenopathy present.     He has no cervical adenopathy.        Right cervical: No superficial cervical, no deep cervical and no posterior cervical adenopathy present.       Left cervical: No superficial cervical, no deep cervical and no posterior cervical adenopathy present.   Neurological: He is alert and oriented to person, place, and time. He has normal reflexes. He displays no atrophy and no tremor. No cranial nerve deficit or sensory deficit. He exhibits normal muscle tone. He displays no seizure activity. Coordination and gait normal.   Reflex Scores:       Bicep reflexes are 2+ on the right side and 2+ on the left side.       Brachioradialis reflexes are 2+ on the right side and 2+ on the left side.       Patellar reflexes are 2+ on the right side and 2+ on the left side.  Skin: Skin is warm and dry. No rash noted. No erythema. No pallor.   Psychiatric: He has a normal mood and affect. His mood appears not anxious. His affect is not angry, not blunt, not labile and not inappropriate. His speech is not rapid and/or pressured, not delayed, not tangential and not  slurred. He is not agitated, not aggressive, not hyperactive, not slowed, not withdrawn, not actively hallucinating and not combative. Thought content is not paranoid and not delusional. Cognition and memory are not impaired. He does not express impulsivity or inappropriate judgment. He does not exhibit a depressed mood. He expresses no homicidal and no suicidal ideation. He expresses no suicidal plans and no homicidal plans. He is communicative. He exhibits normal recent memory and normal remote memory. He is attentive.     Assessment:     1. 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 He has elected the following treatments.     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   - I have expressed to the patient the need to be more compliant with his lifestyle as well as his insulin medication.  This is imperative to prevent complications from his diabetes.   - I have given him another copy of instructions for how to take his insulin included in his discharge summary.   - Advised patient he would need to follow-up in approximately 3 months but that he should call me with any complications or problems that he may experience.   - If he is not able to get his insulin I would like him to contact me as soon as he can.    1.) Patient was instructed to monitor blood glucose twice daily, fasting, post meal and ac dinner or at bedtime. Reminded to bring BG records or meter  "to each visit for review.  2.) Reviewed pathophysiology of type 2 diabetes, complications related to the disease, importance of annual dilated eye exam and self daily foot examination.  3.) Continue medications as prescribed MDI with Lantus and Humalog. [facility] MyChart or Phone review in 1 week with BG records for adjustment of medication.  4.) Reviewed carb counting, portion control, importance of spacing meals throughout the day to prevent post prandial elevations. Recommended low saturated fat, low sodium diet to aid in control of hypertension and cholesterol.  5.) Discussed activity, benefits, methods, and precautions. Recommended patient start/continue some form of exercise and increase as tolerated to 60 minutes per day to facilitate weight loss and aid in control of BGs. Also reminded patient of WHO recommendation of 10,000 steps daily as a goal.   6.) A1C, TSH, Lipid Panel, CMP with eGFR and Micro/Creatinine per ADA protocol.  7.) Return to clinic in 3 months for follow up. Advised patient to call clinic with any questions or concerns.     I have reviewed your results and they are still quite high. I would like you to start "Treating to Target". The treatment will be Insulin and your target will be the Fasting and 2 hour post meal blood sugar. It will work in this manner;    1. Goal for Fasting blood sugar is  mg/dl. I realize that you will need time to adjust to the new levels and presently you may feel too low if you are too aggressive now. So go slow and aim to lower your blood sugar to below 200 then 150 then 100 over several months.  3  2. Goal for 2 hour post meal blood sugar is below 180 mg/dl, here the same rules apply as in #1.    3. You will check your fasting blood sugar daily, if not where we would like it to be over a 3 day period then that evening we will increase the Lantus dose by 5 units. Then repeat the process over the next 3 days. Remember this is a slow process and take our time " "getting to goal. But, each week should be better than prior weeks. Blood sugars below 70 are unacceptable and should raise a "RED FLAG" where we may have to reduce our dose of insulin.    4. You will check your post meal glucose daily as well. However, each day you will check a different meal, (ie. Monday-breakfast; Tuesday- lunch; Wednesday- supper, then repeat). If your post meal glucose is not where we would like, increase pre-meal insulin by 2 units next time. A word of CAUTION: mealtime insulin is dependant on the size and concentration of your meal content. If not consuming a large meal do not take large dose of insulin. Use the reasonable person rule.     5. If you have any questions please do not hesitate to call.    Intensive insulin Therapy with correction factor:    You are on Intensive insulin therapy with Basal and Bolus insulin. Lantus, Levamir or NPH is your Basal insulin and will help maintain your fasting and between meal sugar. Your fast acting or rescue insulin is either Humalog, Novalog or Regular insulin and will control your post meal sugar.     You will Take 50 units of Lantus at 9 pm each night. This will be adjusted up or down depending on your fasting blood sugar before breakfast.    Humalog will follow this pre meal schedule; Correction factor of "2 units per 50 mg/dl" and is based on 30-60 grams of carbohydrates per meal.    If blood sugar is below 70 eat first then check your blood sugar 2 hours later and make correction.  If blood pre-meal sugar is  70 -150 take 20 units of Humalog;  If blood pre-meal sugar is 151-200 take +2 units of Humalog;  If blood pre-meal sugar is 201-250 take +4 units of Humalog;  If blood pre-meal sugar is 251-300 take +6 units of Humalog;  If blood pre-meal sugar is 301-350 take +8 units of Humalog;  If blood pre-meal sugar is 351-400+ take +10 units of Humalog;  Also increase water intake and call for appointment.     A total of 45 minutes was spent in face " to face time, of which 60 % was spent in counseling patient on disease process, complications, treatment, and side effects of medications.    The patient was explained the above plan and given opportunity to ask questions. He understands, chooses and consents to this plan and accepts all the risks, which include but are not limited to the risks mentioned above. He understands the alternative of having no testing, interventions or treatments at this time. He left content and without further questions.

## 2017-04-19 NOTE — PATIENT INSTRUCTIONS
" I have reviewed your results and they are still quite high. I would like you to start "Treating to Target". The treatment will be Insulin and your target will be the Fasting and 2 hour post meal blood sugar. It will work in this manner;    1. Goal for Fasting blood sugar is  mg/dl. I realize that you will need time to adjust to the new levels and presently you may feel too low if you are too aggressive now. So go slow and aim to lower your blood sugar to below 200 then 150 then 100 over several months.  3  2. Goal for 2 hour post meal blood sugar is below 180 mg/dl, here the same rules apply as in #1.    3. You will check your fasting blood sugar daily, if not where we would like it to be over a 3 day period then that evening we will increase the Lantus dose by 5 units. Then repeat the process over the next 3 days. Remember this is a slow process and take our time getting to goal. But, each week should be better than prior weeks. Blood sugars below 70 are unacceptable and should raise a "RED FLAG" where we may have to reduce our dose of insulin.    4. You will check your post meal glucose daily as well. However, each day you will check a different meal, (ie. Monday-breakfast; Tuesday- lunch; Wednesday- supper, then repeat). If your post meal glucose is not where we would like, increase pre-meal insulin by 2 units next time. A word of CAUTION: mealtime insulin is dependant on the size and concentration of your meal content. If not consuming a large meal do not take large dose of insulin. Use the reasonable person rule.     5. If you have any questions please do not hesitate to call.    Intensive insulin Therapy with correction factor:    You are on Intensive insulin therapy with Basal and Bolus insulin. Lantus, Levamir or NPH is your Basal insulin and will help maintain your fasting and between meal sugar. Your fast acting or rescue insulin is either Humalog, Novalog or Regular insulin and will control your " "post meal sugar.     You will Take 50 units of Lantus at 9 pm each night. This will be adjusted up or down depending on your fasting blood sugar before breakfast.    Humalog will follow this pre meal schedule; Correction factor of "2 units per 50 mg/dl" and is based on 30-60 grams of carbohydrates per meal.    If blood sugar is below 70 eat first then check your blood sugar 2 hours later and make correction.  If blood pre-meal sugar is  70 -150 take 20 units of Humalog;  If blood pre-meal sugar is 151-200 take +2 units of Humalog;  If blood pre-meal sugar is 201-250 take +4 units of Humalog;  If blood pre-meal sugar is 251-300 take +6 units of Humalog;  If blood pre-meal sugar is 301-350 take +8 units of Humalog;  If blood pre-meal sugar is 351-400+ take +10 units of Humalog;  Also increase water intake and call for appointment.    "

## 2017-04-19 NOTE — MR AVS SNAPSHOT
[facility] - Podiatry  [address] [facility] [place] [address]  Phone: [phone]  Fax: [phone]                  [place]   2017 8:40 AM   Office Visit    Description:  Male : 1955   Provider:  Barb Veras DPM   Department:  [facility]           Reason for Visit     Nail Care     Diabetes Mellitus                To Do List           Future Appointments        Provider Department Dept Phone    2017 9:30 AM Selene Fishman Jr., PACATERINA [facility] - Diabetes Management [phone]    2017 11:00 AM Mateo Lambert DO [facility] Medicine [phone]    2017 8:15 AM [facility] [phone]    2017 9:00 AM Eri Armstrong NP [facility] Services [phone]    2017 1:00 PM EDUCATION, DIABETES [facility] Diabetes Management [phone]      Goals (5 Years of Data)     None      [facility]     [facility] On [facility] Nurse Care Line -  Assistance  Unless otherwise directed by your provider, please contact [facility], our nurse care line that is available for  assistance.     Registered nurses in the [facility] Center provide: appointment scheduling, clinical advisement, health education, and other advisory services.  Call: [phone] (toll free)               Medications                Verify that the below list of medications is an accurate representation of the medications you are currently taking.  If none reported, the list may be blank. If incorrect, please contact your healthcare provider. Carry this list with you in case of emergency.           Current Medications     amiodarone (PACERONE) 400 MG tablet Take 1 tablet (400 mg total) by mouth once daily.    amlodipine (NORVASC) 10 MG tablet Take 1 tablet (10 mg total) by mouth once daily.    apixaban 5 mg Tab Take 1 tablet (5 mg total) by mouth 2 (two) times daily.    aspirin (ECOTRIN) 81 MG EC tablet Take 1 tablet (81 mg total) by mouth once  "daily.    atorvastatin (LIPITOR) 40 MG tablet Take 1 tablet (40 mg total) by mouth every evening.    blood sugar diagnostic Strp 1 strip by Misc.(Non-Drug; Combo Route) route 6 (six) times daily.    blood-glucose meter (CONTOUR NEXT EZ METER) kit Use as instructed    fluticasone-salmeterol 100-50 mcg/dose (ADVAIR DISKUS) 100-50 mcg/dose diskus inhaler Inhale 1 puff into the lungs 2 (two) times daily. Controller    gabapentin (NEURONTIN) 300 MG capsule Take 1 capsule (300 mg total) by mouth 3 (three) times daily.    insulin glargine (LANTUS SOLOSTAR) 100 unit/mL (3 mL) InPn pen Inject 50 Units into the skin every evening.    insulin lispro (HUMALOG KWIKPEN) 100 unit/mL InPn pen 10 units subcutaneously twice times a day 10-15 min before meals    insulin syringe-needle U-100 1 mL 31 gauge x 5/16 Syrg     latanoprost 0.005 % ophthalmic solution Place 1 drop into both eyes every evening.    lisinopril (PRINIVIL,ZESTRIL) 5 MG tablet Take 1 tablet (5 mg total) by mouth once daily.    metformin (GLUCOPHAGE) 1000 MG tablet Take 1 tablet (1,000 mg total) by mouth 2 (two) times daily with meals.    metoprolol succinate (TOPROL-XL) 50 MG 24 hr tablet TAKE 1 TABLET EVERY DAY    nitroGLYCERIN (NITROSTAT) 0.3 MG SL tablet Place 1 tablet (0.3 mg total) under the tongue every 5 (five) minutes as needed for Chest pain. No more than 3 tablets in one day.    pantoprazole (PROTONIX) 40 MG tablet Take 1 tablet (40 mg total) by mouth once daily.    pen needle, diabetic 31 gauge x 5/16" Ndle Inject 1 pen into the skin 2 (two) times daily. Use one 2 times a day    sertraline (ZOLOFT) 50 MG tablet Take 1 tablet (50 mg total) by mouth once daily.    tiotropium (SPIRIVA WITH HANDIHALER) 18 mcg inhalation capsule Inhale 1 capsule (18 mcg total) into the lungs once daily. Controller           Clinical Reference Information           Your Vitals Were     BP Pulse Height Weight BMI    142/83 (BP Location: Right arm, Patient Position: Sitting, BP " "Method: Automatic) 99 5' 5" (1.651 m) 90.5 kg (199 lb 8.3 oz) 33.2 kg/m2      Blood Pressure          Most Recent Value    BP  (!)  142/83      Allergies as of 4/19/2017     No Known Allergies      Immunizations Administered on Date of Encounter - 4/19/2017     None      Language Assistance Services     ATTENTION: Language assistance services are available, free of charge. Please call [phone].      ATENCIÓN: Si habla genaroañol, tiene a garcia disposición servicios gratuitos de asistencia lingüística. Llame al [phone].     CHÚ Ý: N?u b?n nói Ti?ng Vi?t, có các d?ch v? h? tr? ngôn ng? mi?n phí dành cho b?n. G?i s? [phone].         [facility] complies with applicable Federal civil rights laws and does not discriminate on the basis of race, color, national origin, age, disability, or sex.        "

## 2017-04-19 NOTE — MR AVS SNAPSHOT
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, with long-term current use of insulin    -  Primary            To Do List           Future Appointments        Provider Department Dept Phone    2017 11:00 AM Mateo Lambert DO [facility] [phone]    2017 8:15 AM [facility] [phone]    2017 9:00 AM Eri Armstrong NP [facility] Pulmonary Services [phone]    2017 1:00 PM EDUCATION, DIABETES [facility] - Diabetes Management [phone]    2017 10:15 AM SOILA Dias OD [facility] Ophthalmology [phone]      Goals (5 Years of Data)     None      Follow-Up and Disposition     Call patient with results Return in about 3 months (around 2017), or if symptoms worsen or fail to improve.      [facility]     [facility] Nurse Care Line -  Assistance  Unless otherwise directed by your provider, please contact [facility], our nurse care line that is available for  assistance.     Registered nurses in the [facility] provide: appointment scheduling, clinical advisement, health education, and other advisory services.  Call: [phone] (toll free)               Medications                Verify that the below list of medications is an accurate representation of the medications you are currently taking.  If none reported, the list may be blank. If incorrect, please contact your healthcare provider. Carry this list with you in case of  "emergency.           Current Medications     amiodarone (PACERONE) 400 MG tablet Take 1 tablet (400 mg total) by mouth once daily.    amlodipine (NORVASC) 10 MG tablet Take 1 tablet (10 mg total) by mouth once daily.    apixaban 5 mg Tab Take 1 tablet (5 mg total) by mouth 2 (two) times daily.    aspirin (ECOTRIN) 81 MG EC tablet Take 1 tablet (81 mg total) by mouth once daily.    atorvastatin (LIPITOR) 40 MG tablet Take 1 tablet (40 mg total) by mouth every evening.    blood sugar diagnostic Strp 1 strip by Misc.(Non-Drug; Combo Route) route 6 (six) times daily.    blood-glucose meter (CONTOUR NEXT EZ METER) kit Use as instructed    fluticasone-salmeterol 100-50 mcg/dose (ADVAIR DISKUS) 100-50 mcg/dose diskus inhaler Inhale 1 puff into the lungs 2 (two) times daily. Controller    gabapentin (NEURONTIN) 300 MG capsule Take 1 capsule (300 mg total) by mouth 3 (three) times daily.    insulin glargine (LANTUS SOLOSTAR) 100 unit/mL (3 mL) InPn pen Inject 50 Units into the skin every evening.    insulin lispro (HUMALOG KWIKPEN) 100 unit/mL InPn pen 10 units subcutaneously twice times a day 10-15 min before meals    insulin syringe-needle U-100 1 mL 31 gauge x 5/16 Syrg     latanoprost 0.005 % ophthalmic solution Place 1 drop into both eyes every evening.    lisinopril (PRINIVIL,ZESTRIL) 5 MG tablet Take 1 tablet (5 mg total) by mouth once daily.    metformin (GLUCOPHAGE) 1000 MG tablet Take 1 tablet (1,000 mg total) by mouth 2 (two) times daily with meals.    metoprolol succinate (TOPROL-XL) 50 MG 24 hr tablet TAKE 1 TABLET EVERY DAY    nitroGLYCERIN (NITROSTAT) 0.3 MG SL tablet Place 1 tablet (0.3 mg total) under the tongue every 5 (five) minutes as needed for Chest pain. No more than 3 tablets in one day.    pantoprazole (PROTONIX) 40 MG tablet Take 1 tablet (40 mg total) by mouth once daily.    pen needle, diabetic 31 gauge x 5/16" Ndle Inject 1 pen into the skin 2 (two) times daily. Use one 2 times a day    " "sertraline (ZOLOFT) 50 MG tablet Take 1 tablet (50 mg total) by mouth once daily.    tiotropium (SPIRIVA WITH HANDIHALER) 18 mcg inhalation capsule Inhale 1 capsule (18 mcg total) into the lungs once daily. Controller           Clinical Reference Information           Your Vitals Were     BP Height Weight BMI       148/84 (BP Location: Right arm, Patient Position: Sitting, BP Method: Manual) 5' 5" (1.651 m) 90.2 kg (198 lb 13.7 oz) 33.09 kg/m2       Blood Pressure          Most Recent Value    BP  (!)  148/84      Allergies as of 4/19/2017     No Known Allergies      Immunizations Administered on Date of Encounter - 4/19/2017     None      Instructions     I have reviewed your results and they are still quite high. I would like you to start "Treating to Target". The treatment will be Insulin and your target will be the Fasting and 2 hour post meal blood sugar. It will work in this manner;    1. Goal for Fasting blood sugar is  mg/dl. I realize that you will need time to adjust to the new levels and presently you may feel too low if you are too aggressive now. So go slow and aim to lower your blood sugar to below 200 then 150 then 100 over several months.    2. Goal for 2 hour post meal blood sugar is below 180 mg/dl, here the same rules apply as in #1.    3. You will check your fasting blood sugar daily, if not where we would like it to be over a 3 day period then that evening we will increase the Lantus dose by 5 units. Then repeat the process over the next 3 days. Remember this is a slow process and take our time getting to goal. But, each week should be better than prior weeks. Blood sugars below 70 are unacceptable and should raise a "RED FLAG" where we may have to reduce our dose of insulin.    4. You will check your post meal glucose daily as well. However, each day you will check a different meal, (ie. Monday-breakfast; Tuesday- lunch; Wednesday- supper, then repeat). If your post meal glucose is not " "where we would like, increase pre-meal insulin by 2 units next time. A word of CAUTION: mealtime insulin is dependant on the size and concentration of your meal content. If not consuming a large meal do not take large dose of insulin. Use the reasonable person rule.     5. If you have any questions please do not hesitate to call.    Intensive insulin Therapy with correction factor:    You are on Intensive insulin therapy with Basal and Bolus insulin. Lantus, Levamir or NPH is your Basal insulin and will help maintain your fasting and between meal sugar. Your fast acting or rescue insulin is either Humalog, Novalog or Regular insulin and will control your post meal sugar.     You will Take 50 units of Lantus at 9 pm each night. This will be adjusted up or down depending on your fasting blood sugar before breakfast.    Humalog will follow this pre meal schedule; Correction factor of "2 units per 50 mg/dl" and is based on 30-60 grams of carbohydrates per meal.    If blood sugar is below 70 eat first then check your blood sugar 2 hours later and make correction.  If blood pre-meal sugar is  70 -150 take 20 units of Humalog;  If blood pre-meal sugar is 151-200 take +2 units of Humalog;  If blood pre-meal sugar is 201-250 take +4 units of Humalog;  If blood pre-meal sugar is 251-300 take +6 units of Humalog;  If blood pre-meal sugar is 301-350 take +8 units of Humalog;  If blood pre-meal sugar is 351-400+ take +10 units of Humalog;  Also increase water intake and call for appointment.         Language Assistance Services     ATTENTION: Language assistance services are available, free of charge. Please call [phone].      ATENCIÓN: Si habla español, tiene a garcia disposición servicios gratuitos de asistencia lingüística. Llame al [phone].     CHÚ Ý: N?u b?n nói Ti?ng Vi?t, có các d?ch v? h? tr? ngôn ng? mi?n phí dành cho b?n. G?i s? [phone].         [facility] complies with applicable " Federal civil rights laws and does not discriminate on the basis of race, color, national origin, age, disability, or sex.

## 2017-04-21 ENCOUNTER — OFFICE VISIT (OUTPATIENT)
Dept: INTERNAL MEDICINE | Facility: CLINIC | Age: 62
End: 2017-04-21
Payer: MEDICARE

## 2017-04-21 VITALS
OXYGEN SATURATION: 96 % | HEART RATE: 80 BPM | WEIGHT: 198 LBS | DIASTOLIC BLOOD PRESSURE: 80 MMHG | HEIGHT: 65 IN | RESPIRATION RATE: 16 BRPM | TEMPERATURE: 98 F | SYSTOLIC BLOOD PRESSURE: 137 MMHG | BODY MASS INDEX: 32.99 KG/M2

## 2017-04-21 DIAGNOSIS — T23.029S: ICD-10-CM

## 2017-04-21 DIAGNOSIS — M76.31 IT BAND SYNDROME, RIGHT: ICD-10-CM

## 2017-04-21 DIAGNOSIS — J01.00 ACUTE NON-RECURRENT MAXILLARY SINUSITIS: Primary | ICD-10-CM

## 2017-04-21 PROCEDURE — 1160F RVW MEDS BY RX/DR IN RCRD: CPT | Mod: S$GLB,,, | Performed by: FAMILY MEDICINE

## 2017-04-21 PROCEDURE — 3075F SYST BP GE 130 - 139MM HG: CPT | Mod: S$GLB,,, | Performed by: FAMILY MEDICINE

## 2017-04-21 PROCEDURE — 3079F DIAST BP 80-89 MM HG: CPT | Mod: S$GLB,,, | Performed by: FAMILY MEDICINE

## 2017-04-21 PROCEDURE — 99214 OFFICE O/P EST MOD 30 MIN: CPT | Mod: S$GLB,,, | Performed by: FAMILY MEDICINE

## 2017-04-21 PROCEDURE — 99999 PR PBB SHADOW E&M-EST. PATIENT-LVL III: CPT | Mod: PBBFAC,,, | Performed by: FAMILY MEDICINE

## 2017-04-21 RX ORDER — AMOXICILLIN AND CLAVULANATE POTASSIUM 875; 125 MG/1; MG/1
1 TABLET, FILM COATED ORAL EVERY 12 HOURS
Qty: 14 TABLET | Refills: 0 | Status: SHIPPED | OUTPATIENT
Start: 2017-04-21 | End: 2017-04-28

## 2017-04-21 NOTE — PATIENT INSTRUCTIONS
Sinusitis (Antibiotic Treatment)    The sinuses are air-filled spaces within the bones of the face. They connect to the inside of the nose. Sinusitis is an inflammation of the tissue lining the sinus cavity. Sinus inflammation can occur during a cold. It can also be due to allergies to pollens and other particles in the air. Sinusitis can cause symptoms of sinus congestion and fullness. A sinus infection causes fever, headache and facial pain. There is often green or yellow drainage from the nose or into the back of the throat (post-nasal drip). You have been given antibiotics to treat this condition.  Home care:  · Take the full course of antibiotics as instructed. Do not stop taking them, even if you feel better.  · Drink plenty of water, hot tea, and other liquids. This may help thin mucus. It also may promote sinus drainage.  · Heat may help soothe painful areas of the face. Use a towel soaked in hot water. Or,  the shower and direct the hot spray onto your face. Using a vaporizer along with a menthol rub at night may also help.   · An expectorant containing guaifenesin may help thin the mucus and promote drainage from the sinuses.  · Over-the-counter decongestants may be used unless a similar medicine was prescribed. Nasal sprays work the fastest. Use one that contains phenylephrine or oxymetazoline. First blow the nose gently. Then use the spray. Do not use these medicines more often than directed on the label or symptoms may get worse. You may also use tablets containing pseudoephedrine. Avoid products that combine ingredients, because side effects may be increased. Read labels. You can also ask the pharmacist for help. (NOTE: Persons with high blood pressure should not use decongestants. They can raise blood pressure.)  · Over-the-counter antihistamines may help if allergies contributed to your sinusitis.    · Do not use nasal rinses or irrigation during an acute sinus infection, unless told to by  your health care provider. Rinsing may spread the infection to other sinuses.  · Use acetaminophen or ibuprofen to control pain, unless another pain medicine was prescribed. (If you have chronic liver or kidney disease or ever had a stomach ulcer, talk with your doctor before using these medicines. Aspirin should never be used in anyone under 18 years of age who is ill with a fever. It may cause severe liver damage.)  · Don't smoke. This can worsen symptoms.  Follow-up care  Follow up with your healthcare provider or our staff if you are not improving within the next week.  When to seek medical advice  Call your healthcare provider if any of these occur:  · Facial pain or headache becoming more severe  · Stiff neck  · Unusual drowsiness or confusion  · Swelling of the forehead or eyelids  · Vision problems, including blurred or double vision  · Fever of 100.4ºF (38ºC) or higher, or as directed by your healthcare provider  · Seizure  · Breathing problems  · Symptoms not resolving within 10 days  Date Last Reviewed: 4/13/2015 © 2000-2016 [employer]. [address] [place] [address]. All rights reserved. This information is not intended as a substitute for professional medical care. Always follow your healthcare professional's instructions.        Understanding Iliotibial Band Syndrome  Iliotibial band syndrome, or IT band syndrome, is a condition that causes pain on the outside of the knee. It most often occurs in athletes, especially long-distance runners. It can happen if you cycle, ski, row, or play soccer. It can also occur in people who are starting to exercise.  What is the IT band?  The iliotibial (IT) band is a strong, thick band of tissue that runs down the outside of your thigh. It goes all the way from your hip bones to the top of your shinbone (tibia), just below your knee joint. The bones of your knee joint include your tibia, your thighbone (femur), and your kneecap  (patella).  When you bend and straighten your leg, the IT band moves over the outer lower edge of your femur. Over time, bending and straightening your leg can cause the IT band to irritate nearby tissues and cause pain.  What causes IT band syndrome?  Researchers are still learning the exact cause of IT band syndrome. The pain may be caused by the IT band rubbing over the lower outer edge of the femur. This may cause inflammation in the bone, tendons, and small fluid-filled sacs (bursa) in the area. The IT band may also compress the tissue under it and cause pain.  If you are a runner, you might be more likely to develop IT band syndrome if:  · You run on uneven or downhill terrain  · You run on worn-out shoes  · You run many miles per day  · Your legs slope a little inward from your knee to your ankle (bowlegs)  Symptoms of IT band syndrome  IT band syndrome causes pain on the outside of the knee. It might affect one or both of your knees. The pain is an aching, burning feeling that can spread up the thigh to the hip. You may feel this pain only when you exercise, such as while running. The pain may be worst right after you step on your foot. It may only happen near the end of your exercise. As the condition gets worse, pain may start earlier and continue after you have stopped exercising. Going up and down the stairs may make the pain worse.  Diagnosing IT band syndrome  Your doctor will ask about your health history and your symptoms. He or she will give you a physical exam. This will include an exam of your knee. The range of motion and strength of your knee will be tested. The doctor will look for areas of pain around your knee. The symptoms of IT band syndrome can be like those of osteoarthritis or a meniscal tear. Your doctor will need to make sure IT band syndrome is the cause of your symptoms. If the diagnosis is not clear, you may need imaging tests. These can include an X-ray or MRI scan.  Date Last  Reviewed: 4/22/2015  © 8633-8544 [employer]. [address] [place] [address], [place] [address]. All rights reserved. This information is not intended as a substitute for professional medical care. Always follow your healthcare professional's instructions.

## 2017-04-21 NOTE — MR AVS SNAPSHOT
[facility]  [address] [address] [address]  Phone: [phone]                  Crow Green   2017 11:00 AM   Office Visit    Description:  Male : 1955   Provider:  Mateo Lambert DO   Department:  [facility]           Reason for Visit     Cough     Leg Pain           Diagnoses this Visit        Comments    Acute non-recurrent maxillary sinusitis    -  Primary     IT band syndrome, right         Burn of finger, unspecified laterality, unspecified degree, sequela                To Do List           Future Appointments        Provider Department Dept Phone    2017 8:15 AM Eduard Zhao MD [facility]Card[facility] [phone]    2017 9:00 AM Eri Armstrong NP [facility] - Pulmonary Services [phone]    2017 1:00 PM EDUCATION, DIABETES [facility] - Diabetes Management [phone]    2017 10:15 AM SOILA Dias OD [facility] - Ophthalmology [phone]    2017 1:00 PM EDUCATION, DIABETES [facility] - Diabetes Management [phone]      Goals (5 Years of Data)     None      Follow-Up and Disposition     Return if symptoms worsen or fail to improve.    Follow-up and Disposition History       These Medications        Disp Refills Start End    amoxicillin-clavulanate 875-125mg (AUGMENTIN) 875-125 mg per tablet 14 tablet 0 2017    Take 1 tablet by mouth every 12 (twelve) hours. - Oral    Pharmacy: [facility] [address] [place] [address] [address] AT [facility] [address] Ph #: [phone]         [facility]     [facility] - 24 Assistance  Unless otherwise directed by your provider, please contact [facility], our nurse care line that is available for  assistance.     Registered nurses in the [facility] provide: appointment scheduling, clinical advisement, health education, and other advisory services.  Call: [phone] (toll free)                Medications           START taking these NEW medications        Refills    amoxicillin-clavulanate 875-125mg (AUGMENTIN) 875-125 mg per tablet 0    Sig: Take 1 tablet by mouth every 12 (twelve) hours.    Class: Normal    Route: Oral           Verify that the below list of medications is an accurate representation of the medications you are currently taking.  If none reported, the list may be blank. If incorrect, please contact your healthcare provider. Carry this list with you in case of emergency.           Current Medications     amiodarone (PACERONE) 400 MG tablet Take 1 tablet (400 mg total) by mouth once daily.    amlodipine (NORVASC) 10 MG tablet Take 1 tablet (10 mg total) by mouth once daily.    apixaban 5 mg Tab Take 1 tablet (5 mg total) by mouth 2 (two) times daily.    aspirin (ECOTRIN) 81 MG EC tablet Take 1 tablet (81 mg total) by mouth once daily.    atorvastatin (LIPITOR) 40 MG tablet Take 1 tablet (40 mg total) by mouth every evening.    blood sugar diagnostic Strp 1 strip by Misc.(Non-Drug; Combo Route) route 6 (six) times daily.    fluticasone-salmeterol 100-50 mcg/dose (ADVAIR DISKUS) 100-50 mcg/dose diskus inhaler Inhale 1 puff into the lungs 2 (two) times daily. Controller    gabapentin (NEURONTIN) 300 MG capsule Take 1 capsule (300 mg total) by mouth 3 (three) times daily.    insulin glargine (LANTUS SOLOSTAR) 100 unit/mL (3 mL) InPn pen Inject 50 Units into the skin every evening.    insulin lispro (HUMALOG KWIKPEN) 100 unit/mL InPn pen 10 units subcutaneously twice times a day 10-15 min before meals    insulin syringe-needle U-100 1 mL 31 gauge x 5/16 Syrg     latanoprost 0.005 % ophthalmic solution Place 1 drop into both eyes every evening.    lisinopril (PRINIVIL,ZESTRIL) 5 MG tablet Take 1 tablet (5 mg total) by mouth once daily.    metformin (GLUCOPHAGE) 1000 MG tablet Take 1 tablet (1,000 mg total) by mouth 2 (two) times daily with meals.    metoprolol succinate (TOPROL-XL) 50 MG 24 hr  "tablet TAKE 1 TABLET EVERY DAY    nitroGLYCERIN (NITROSTAT) 0.3 MG SL tablet Place 1 tablet (0.3 mg total) under the tongue every 5 (five) minutes as needed for Chest pain. No more than 3 tablets in one day.    pantoprazole (PROTONIX) 40 MG tablet Take 1 tablet (40 mg total) by mouth once daily.    pen needle, diabetic 31 gauge x 5/16" Ndle Inject 1 pen into the skin 2 (two) times daily. Use one 2 times a day    sertraline (ZOLOFT) 50 MG tablet Take 1 tablet (50 mg total) by mouth once daily.    tiotropium (SPIRIVA WITH HANDIHALER) 18 mcg inhalation capsule Inhale 1 capsule (18 mcg total) into the lungs once daily. Controller    TRUE METRIX AIR GLUCOSE METER kit TEST FOUR TIMES DAILY BEFORE MEALS  AND EVERY NIGHT    amoxicillin-clavulanate 875-125mg (AUGMENTIN) 875-125 mg per tablet Take 1 tablet by mouth every 12 (twelve) hours.           Clinical Reference Information           Your Vitals Were     BP Pulse Temp Resp Height Weight    137/80 (BP Location: Left arm, Patient Position: Sitting, BP Method: Automatic) 80 98 °F (36.7 °C) (Oral) 16 5' 5" (1.651 m) 89.8 kg (197 lb 15.6 oz)    SpO2 BMI             96% 32.94 kg/m2         Blood Pressure          Most Recent Value    BP  137/80      Allergies as of 4/21/2017     No Known Allergies      Immunizations Administered on Date of Encounter - 4/21/2017     None      Instructions      Sinusitis (Antibiotic Treatment)    The sinuses are air-filled spaces within the bones of the face. They connect to the inside of the nose. Sinusitis is an inflammation of the tissue lining the sinus cavity. Sinus inflammation can occur during a cold. It can also be due to allergies to pollens and other particles in the air. Sinusitis can cause symptoms of sinus congestion and fullness. A sinus infection causes fever, headache and facial pain. There is often green or yellow drainage from the nose or into the back of the throat (post-nasal drip). You have been given antibiotics to treat " this condition.  Home care:  · Take the full course of antibiotics as instructed. Do not stop taking them, even if you feel better.  · Drink plenty of water, hot tea, and other liquids. This may help thin mucus. It also may promote sinus drainage.  · Heat may help soothe painful areas of the face. Use a towel soaked in hot water. Or,  the shower and direct the hot spray onto your face. Using a vaporizer along with a menthol rub at night may also help.   · An expectorant containing guaifenesin may help thin the mucus and promote drainage from the sinuses.  · Over-the-counter decongestants may be used unless a similar medicine was prescribed. Nasal sprays work the fastest. Use one that contains phenylephrine or oxymetazoline. First blow the nose gently. Then use the spray. Do not use these medicines more often than directed on the label or symptoms may get worse. You may also use tablets containing pseudoephedrine. Avoid products that combine ingredients, because side effects may be increased. Read labels. You can also ask the pharmacist for help. (NOTE: Persons with high blood pressure should not use decongestants. They can raise blood pressure.)  · Over-the-counter antihistamines may help if allergies contributed to your sinusitis.    · Do not use nasal rinses or irrigation during an acute sinus infection, unless told to by your health care provider. Rinsing may spread the infection to other sinuses.  · Use acetaminophen or ibuprofen to control pain, unless another pain medicine was prescribed. (If you have chronic liver or kidney disease or ever had a stomach ulcer, talk with your doctor before using these medicines. Aspirin should never be used in anyone under 18 years of age who is ill with a fever. It may cause severe liver damage.)  · Don't smoke. This can worsen symptoms.  Follow-up care  Follow up with your healthcare provider or our staff if you are not improving within the next week.  When to seek  medical advice  Call your healthcare provider if any of these occur:  · Facial pain or headache becoming more severe  · Stiff neck  · Unusual drowsiness or confusion  · Swelling of the forehead or eyelids  · Vision problems, including blurred or double vision  · Fever of 100.4ºF (38ºC) or higher, or as directed by your healthcare provider  · Seizure  · Breathing problems  · Symptoms not resolving within 10 days  Date Last Reviewed: 4/13/2015 © 2000-2016 [employer]. [address] [place] [address]. All rights reserved. This information is not intended as a substitute for professional medical care. Always follow your healthcare professional's instructions.        Understanding Iliotibial Band Syndrome  Iliotibial band syndrome, or IT band syndrome, is a condition that causes pain on the outside of the knee. It most often occurs in athletes, especially long-distance runners. It can happen if you cycle, ski, row, or play soccer. It can also occur in people who are starting to exercise.  What is the IT band?  The iliotibial (IT) band is a strong, thick band of tissue that runs down the outside of your thigh. It goes all the way from your hip bones to the top of your shinbone (tibia), just below your knee joint. The bones of your knee joint include your tibia, your thighbone (femur), and your kneecap (patella).  When you bend and straighten your leg, the IT band moves over the outer lower edge of your femur. Over time, bending and straightening your leg can cause the IT band to irritate nearby tissues and cause pain.  What causes IT band syndrome?  Researchers are still learning the exact cause of IT band syndrome. The pain may be caused by the IT band rubbing over the lower outer edge of the femur. This may cause inflammation in the bone, tendons, and small fluid-filled sacs (bursa) in the area. The IT band may also compress the tissue under it and cause pain.  If you are a runner, you might be  more likely to develop IT band syndrome if:  · You run on uneven or downhill terrain  · You run on worn-out shoes  · You run many miles per day  · Your legs slope a little inward from your knee to your ankle (bowlegs)  Symptoms of IT band syndrome  IT band syndrome causes pain on the outside of the knee. It might affect one or both of your knees. The pain is an aching, burning feeling that can spread up the thigh to the hip. You may feel this pain only when you exercise, such as while running. The pain may be worst right after you step on your foot. It may only happen near the end of your exercise. As the condition gets worse, pain may start earlier and continue after you have stopped exercising. Going up and down the stairs may make the pain worse.  Diagnosing IT band syndrome  Your doctor will ask about your health history and your symptoms. He or she will give you a physical exam. This will include an exam of your knee. The range of motion and strength of your knee will be tested. The doctor will look for areas of pain around your knee. The symptoms of IT band syndrome can be like those of osteoarthritis or a meniscal tear. Your doctor will need to make sure IT band syndrome is the cause of your symptoms. If the diagnosis is not clear, you may need imaging tests. These can include an X-ray or MRI scan.  Date Last Reviewed: 4/22/2015 © 2000-2016 [employer]. [address] [place] [address]. All rights reserved. This information is not intended as a substitute for professional medical care. Always follow your healthcare professional's instructions.             Language Assistance Services     ATTENTION: Language assistance services are available, free of charge. Please call [phone].      ATENCIÓN: Si guy victoria, tiene a garcia disposición servicios gratuitos de asistencia lingüística. Llame al [phone].     CHÚ Ý: N?u b?n nói Ti?ng Vi?t, có các d?ch v? h? tr? ngôn ng? mi?n phí  dành cho b?n. G?i s? [phone].         [facility] complies with applicable Federal civil rights laws and does not discriminate on the basis of race, color, national origin, age, disability, or sex.

## 2017-04-21 NOTE — PROGRESS NOTES
Subjective:       Patient ID: Crow Green is a 61 y.o. male.    Chief Complaint: Cough (green mucus) and Leg Pain (rt thigh)    Sinus Problem   This is a new problem. The current episode started in the past 7 days. The problem has been waxing and waning since onset. There has been no fever. The pain is moderate. Associated symptoms include chills (sweats at night), congestion, coughing and sinus pressure (especially on left side). Pertinent negatives include no diaphoresis, ear pain, hoarse voice, neck pain, shortness of breath, sneezing or sore throat. Past treatments include oral decongestants. The treatment provided mild relief.     Started having right hip pain for about the last week.  Did not have any trauma to cause the pain to start.  He said he noticed that whenever he was standing up a lot helping to oil some seafood.  Has taken some ibuprofen which has helped a little but he has not done anything else for this.    Also reported that he burned the tips of his fingers one week ago after touching a hot pot.    Family History   Problem Relation Age of Onset    Glaucoma Mother     Cataracts Mother     Cataracts Father     Glaucoma Sister     Cataracts Sister     Glaucoma Maternal Aunt     Prostate cancer Neg Hx        Current Outpatient Prescriptions:     amiodarone (PACERONE) 400 MG tablet, Take 1 tablet (400 mg total) by mouth once daily., Disp: 30 tablet, Rfl: 1    amlodipine (NORVASC) 10 MG tablet, Take 1 tablet (10 mg total) by mouth once daily., Disp: 90 tablet, Rfl: 3    apixaban 5 mg Tab, Take 1 tablet (5 mg total) by mouth 2 (two) times daily., Disp: 60 tablet, Rfl: 1    aspirin (ECOTRIN) 81 MG EC tablet, Take 1 tablet (81 mg total) by mouth once daily., Disp: , Rfl: 0    atorvastatin (LIPITOR) 40 MG tablet, Take 1 tablet (40 mg total) by mouth every evening., Disp: 90 tablet, Rfl: 3    blood sugar diagnostic Strp, 1 strip by Misc.(Non-Drug; Combo Route) route 6 (six) times daily.,  "Disp: 160 strip, Rfl: 11    fluticasone-salmeterol 100-50 mcg/dose (ADVAIR DISKUS) 100-50 mcg/dose diskus inhaler, Inhale 1 puff into the lungs 2 (two) times daily. Controller, Disp: 180 each, Rfl: 3    gabapentin (NEURONTIN) 300 MG capsule, Take 1 capsule (300 mg total) by mouth 3 (three) times daily., Disp: 270 capsule, Rfl: 3    insulin glargine (LANTUS SOLOSTAR) 100 unit/mL (3 mL) InPn pen, Inject 50 Units into the skin every evening., Disp: 2 Box, Rfl: 11    insulin lispro (HUMALOG KWIKPEN) 100 unit/mL InPn pen, 10 units subcutaneously twice times a day 10-15 min before meals (Patient taking differently: 20 units subcutaneously twice times a day 10-15 min before meals), Disp: 1 Box, Rfl: 11    insulin syringe-needle U-100 1 mL 31 gauge x 5/16 Syrg, , Disp: , Rfl:     latanoprost 0.005 % ophthalmic solution, Place 1 drop into both eyes every evening., Disp: 1 Bottle, Rfl: 4    lisinopril (PRINIVIL,ZESTRIL) 5 MG tablet, Take 1 tablet (5 mg total) by mouth once daily., Disp: 90 tablet, Rfl: 2    metformin (GLUCOPHAGE) 1000 MG tablet, Take 1 tablet (1,000 mg total) by mouth 2 (two) times daily with meals., Disp: 180 tablet, Rfl: 3    metoprolol succinate (TOPROL-XL) 50 MG 24 hr tablet, TAKE 1 TABLET EVERY DAY, Disp: 30 tablet, Rfl: 5    nitroGLYCERIN (NITROSTAT) 0.3 MG SL tablet, Place 1 tablet (0.3 mg total) under the tongue every 5 (five) minutes as needed for Chest pain. No more than 3 tablets in one day., Disp: 30 tablet, Rfl: 1    pantoprazole (PROTONIX) 40 MG tablet, Take 1 tablet (40 mg total) by mouth once daily., Disp: 30 tablet, Rfl: 11    pen needle, diabetic 31 gauge x 5/16" Ndle, Inject 1 pen into the skin 2 (two) times daily. Use one 2 times a day, Disp: 100 each, Rfl: 3    sertraline (ZOLOFT) 50 MG tablet, Take 1 tablet (50 mg total) by mouth once daily., Disp: 90 tablet, Rfl: 3    tiotropium (SPIRIVA WITH HANDIHALER) 18 mcg inhalation capsule, Inhale 1 capsule (18 mcg total) into the " "lungs once daily. Controller, Disp: 90 capsule, Rfl: 3    TRUE METRIX AIR GLUCOSE METER kit, TEST FOUR TIMES DAILY BEFORE MEALS  AND EVERY NIGHT, Disp: 1 each, Rfl: 0    Review of Systems   Constitutional: Positive for chills (sweats at night). Negative for diaphoresis.   HENT: Positive for congestion and sinus pressure (especially on left side). Negative for ear pain, hoarse voice, sneezing and sore throat.    Respiratory: Positive for cough. Negative for shortness of breath.    Musculoskeletal: Negative for neck pain.       Objective:   /80 (BP Location: Left arm, Patient Position: Sitting, BP Method: Automatic)  Pulse 80  Temp 98 °F (36.7 °C) (Oral)   Resp 16  Ht 5' 5" (1.651 m)  Wt 89.8 kg (197 lb 15.6 oz)  SpO2 96%  BMI 32.94 kg/m2     Physical Exam   Constitutional: He is oriented to person, place, and time. He appears well-developed and well-nourished. No distress.   HENT:   Head: Normocephalic and atraumatic.   Nose: Right sinus exhibits no maxillary sinus tenderness and no frontal sinus tenderness. Left sinus exhibits maxillary sinus tenderness and frontal sinus tenderness.   Eyes: Conjunctivae and EOM are normal. Pupils are equal, round, and reactive to light. Right eye exhibits no discharge. Left eye exhibits no discharge.   Neck: No thyromegaly present.   Cardiovascular: Normal rate, regular rhythm and normal heart sounds.    No murmur heard.  Pulmonary/Chest: Effort normal and breath sounds normal. No respiratory distress. He has no wheezes.   Abdominal: Soft. He exhibits no distension.   Musculoskeletal: He exhibits tenderness (tenderness to palpation over the right distal IT band.  No problems with hip joint articulation.  No erythema nor skin rash.). He exhibits no edema.   Neurological: He is alert and oriented to person, place, and time.   Skin: Skin is warm. No rash noted. He is not diaphoretic.   Has healing burns on multiple tips of fingers on both hands.   Psychiatric: He has a " normal mood and affect. His behavior is normal.   Vitals reviewed.      Assessment & Plan     1. Acute non-recurrent maxillary sinusitis  Due to the duration and severity of symptoms lasting over a week we'll treat with antibiotics.  Using Augmentin for 1 week.    2. IT band syndrome, right  Counseled him on the diagnosis and recommended stretching along with heating pad and scheduled ibuprofen for the next week.    3. Burn of finger, unspecified laterality, unspecified degree, sequela  Continue supportive care.  No signs of infection at this time.

## 2017-04-28 ENCOUNTER — OFFICE VISIT (OUTPATIENT)
Dept: CARDIOLOGY | Facility: CLINIC | Age: 62
End: 2017-04-28
Payer: MEDICARE

## 2017-04-28 VITALS
DIASTOLIC BLOOD PRESSURE: 74 MMHG | BODY MASS INDEX: 32.69 KG/M2 | WEIGHT: 196.19 LBS | SYSTOLIC BLOOD PRESSURE: 122 MMHG | HEIGHT: 65 IN | HEART RATE: 97 BPM

## 2017-04-28 DIAGNOSIS — I25.10 CORONARY ARTERY DISEASE INVOLVING NATIVE CORONARY ARTERY OF NATIVE HEART WITHOUT ANGINA PECTORIS: Primary | ICD-10-CM

## 2017-04-28 DIAGNOSIS — Z79.4 UNCONTROLLED TYPE 2 DIABETES MELLITUS WITH MILD NONPROLIFERATIVE RETINOPATHY WITHOUT MACULAR EDEMA, WITH LONG-TERM CURRENT USE OF INSULIN, UNSPECIFIED LATERALITY: ICD-10-CM

## 2017-04-28 DIAGNOSIS — E11.3299 UNCONTROLLED TYPE 2 DIABETES MELLITUS WITH MILD NONPROLIFERATIVE RETINOPATHY WITHOUT MACULAR EDEMA, WITH LONG-TERM CURRENT USE OF INSULIN, UNSPECIFIED LATERALITY: ICD-10-CM

## 2017-04-28 DIAGNOSIS — I10 ESSENTIAL HYPERTENSION: ICD-10-CM

## 2017-04-28 DIAGNOSIS — I48.0 PAF (PAROXYSMAL ATRIAL FIBRILLATION): ICD-10-CM

## 2017-04-28 DIAGNOSIS — I50.42 CHRONIC COMBINED SYSTOLIC AND DIASTOLIC CONGESTIVE HEART FAILURE: ICD-10-CM

## 2017-04-28 DIAGNOSIS — E11.65 UNCONTROLLED TYPE 2 DIABETES MELLITUS WITH MILD NONPROLIFERATIVE RETINOPATHY WITHOUT MACULAR EDEMA, WITH LONG-TERM CURRENT USE OF INSULIN, UNSPECIFIED LATERALITY: ICD-10-CM

## 2017-04-28 PROCEDURE — 3045F PR MOST RECENT HEMOGLOBIN A1C LEVEL 7.0-9.0%: CPT | Mod: S$GLB,,, | Performed by: INTERNAL MEDICINE

## 2017-04-28 PROCEDURE — 3074F SYST BP LT 130 MM HG: CPT | Mod: S$GLB,,, | Performed by: INTERNAL MEDICINE

## 2017-04-28 PROCEDURE — 4010F ACE/ARB THERAPY RXD/TAKEN: CPT | Mod: S$GLB,,, | Performed by: INTERNAL MEDICINE

## 2017-04-28 PROCEDURE — 99214 OFFICE O/P EST MOD 30 MIN: CPT | Mod: S$GLB,,, | Performed by: INTERNAL MEDICINE

## 2017-04-28 PROCEDURE — 99999 PR PBB SHADOW E&M-EST. PATIENT-LVL III: CPT | Mod: PBBFAC,,, | Performed by: INTERNAL MEDICINE

## 2017-04-28 PROCEDURE — 93000 ELECTROCARDIOGRAM COMPLETE: CPT | Mod: S$GLB,,, | Performed by: INTERNAL MEDICINE

## 2017-04-28 PROCEDURE — 3078F DIAST BP <80 MM HG: CPT | Mod: S$GLB,,, | Performed by: INTERNAL MEDICINE

## 2017-04-28 PROCEDURE — 1160F RVW MEDS BY RX/DR IN RCRD: CPT | Mod: S$GLB,,, | Performed by: INTERNAL MEDICINE

## 2017-04-28 RX ORDER — AMIODARONE HYDROCHLORIDE 200 MG/1
200 TABLET ORAL DAILY
Qty: 30 TABLET | Refills: 11 | Status: SHIPPED | OUTPATIENT
Start: 2017-04-28 | End: 2018-09-13

## 2017-04-28 NOTE — MR AVS SNAPSHOT
[facility]  [address] [address] [address]  Phone: [phone]                  Crow Green   2017 8:15 AM   Office Visit    Description:  Male : 1955   Provider:  Eduard Zhao MD   Department:  [facility]           Reason for Visit     Coronary Artery Disease     Chest Pain           Diagnoses this Visit        Comments    Coronary artery disease involving native coronary artery of native heart without angina pectoris    -  Primary     Chronic combined systolic and diastolic congestive heart failure         Essential hypertension         PAF (paroxysmal atrial fibrillation)         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, with long-term current use of insulin, unspecified laterality                To Do List           Future Appointments        Provider Department Dept Phone    2017 9:00 AM Eri Armstrong NP [facility] - Pulmonary Services [phone]    2017 1:00 PM EDUCATION, DIABETES [facility] - Diabetes Management [phone]    2017 10:15 AM SOILA Dias OD [facility] - Ophthalmology [phone]    2017 1:00 PM EDUCATION, DIABETES [facility] - Diabetes Management [phone]    2017 10:20 AM PULMONARY LAB, [facility]- Pulmonary Function [facility] [phone]      Goals (5 Years of Data)     None      Follow-Up and Disposition     Return in about 3 months (around 2017).       These Medications        Disp Refills Start End    amiodarone (PACERONE) 200 MG Tab 30 tablet 11 2017    Take 1 tablet (200 mg total) by mouth once daily. - Oral    Pharmacy: [id number] - [place], [place] - [address] Ph #: [phone]         [facility]     [facility] -  Assistance  Unless otherwise directed by your provider, please contact [facility], our nurse care line that is available for  assistance.      Registered nurses in the [facility] provide: appointment scheduling, clinical advisement, health education, and other advisory services.  Call: [phone] (toll free)               Medications           START taking these NEW medications        Refills    amiodarone (PACERONE) 200 MG Tab 11    Sig: Take 1 tablet (200 mg total) by mouth once daily.    Class: Normal    Route: Oral           Verify that the below list of medications is an accurate representation of the medications you are currently taking.  If none reported, the list may be blank. If incorrect, please contact your healthcare provider. Carry this list with you in case of emergency.           Current Medications     amiodarone (PACERONE) 200 MG Tab Take 1 tablet (200 mg total) by mouth once daily.    amlodipine (NORVASC) 10 MG tablet Take 1 tablet (10 mg total) by mouth once daily.    amoxicillin-clavulanate 875-125mg (AUGMENTIN) 875-125 mg per tablet Take 1 tablet by mouth every 12 (twelve) hours.    apixaban 5 mg Tab Take 1 tablet (5 mg total) by mouth 2 (two) times daily.    aspirin (ECOTRIN) 81 MG EC tablet Take 1 tablet (81 mg total) by mouth once daily.    atorvastatin (LIPITOR) 40 MG tablet Take 1 tablet (40 mg total) by mouth every evening.    blood sugar diagnostic Strp 1 strip by Misc.(Non-Drug; Combo Route) route 6 (six) times daily.    fluticasone-salmeterol 100-50 mcg/dose (ADVAIR DISKUS) 100-50 mcg/dose diskus inhaler Inhale 1 puff into the lungs 2 (two) times daily. Controller    gabapentin (NEURONTIN) 300 MG capsule Take 1 capsule (300 mg total) by mouth 3 (three) times daily.    insulin glargine (LANTUS SOLOSTAR) 100 unit/mL (3 mL) InPn pen Inject 50 Units into the skin every evening.    insulin lispro (HUMALOG KWIKPEN) 100 unit/mL InPn pen 10 units subcutaneously twice times a day 10-15 min before meals    insulin syringe-needle U-100 1 mL 31 gauge x 5/16 Syrg     latanoprost 0.005 % ophthalmic solution Place 1 drop  "into both eyes every evening.    lisinopril (PRINIVIL,ZESTRIL) 5 MG tablet Take 1 tablet (5 mg total) by mouth once daily.    metformin (GLUCOPHAGE) 1000 MG tablet Take 1 tablet (1,000 mg total) by mouth 2 (two) times daily with meals.    metoprolol succinate (TOPROL-XL) 50 MG 24 hr tablet TAKE 1 TABLET EVERY DAY    nitroGLYCERIN (NITROSTAT) 0.3 MG SL tablet Place 1 tablet (0.3 mg total) under the tongue every 5 (five) minutes as needed for Chest pain. No more than 3 tablets in one day.    pantoprazole (PROTONIX) 40 MG tablet Take 1 tablet (40 mg total) by mouth once daily.    pen needle, diabetic 31 gauge x 5/16" Ndle Inject 1 pen into the skin 2 (two) times daily. Use one 2 times a day    sertraline (ZOLOFT) 50 MG tablet Take 1 tablet (50 mg total) by mouth once daily.    tiotropium (SPIRIVA WITH HANDIHALER) 18 mcg inhalation capsule Inhale 1 capsule (18 mcg total) into the lungs once daily. Controller    TRUE METRIX AIR GLUCOSE METER kit TEST FOUR TIMES DAILY BEFORE MEALS  AND EVERY NIGHT           Clinical Reference Information           Your Vitals Were     BP Pulse Height Weight BMI    122/74 97 5' 5" (1.651 m) 89 kg (196 lb 3.4 oz) 32.65 kg/m2      Blood Pressure          Most Recent Value    BP  122/74      Allergies as of 4/28/2017     No Known Allergies      Immunizations Administered on Date of Encounter - 4/28/2017     None      Orders Placed During Today's Visit     Future Labs/Procedures Expected by Expires    Comprehensive metabolic panel  4/28/2017 6/27/2018    Lipid panel  4/28/2017 6/27/2018    TSH  4/28/2017 6/27/2018    X-Ray Chest PA And Lateral  4/28/2017 4/28/2018      Language Assistance Services     ATTENTION: Language assistance services are available, free of charge. Please call [phone].      ATENCIÓN: Si habla julien, tiene a garcia disposición servicios gratuitos de asistencia lingüística. Llame al [phone].     CHÚ Ý: N?u b?n nói Ti?ng Vi?t, có các d?ch v? h? tr? ngôn ng? mi?n " phí dành cho b?n. G?i s? [phone].         [facility] complies with applicable Federal civil rights laws and does not discriminate on the basis of race, color, national origin, age, disability, or sex.

## 2017-04-28 NOTE — PROGRESS NOTES
Subjective:   Patient ID:  Crow Green is a 61 y.o. male who presents for follow up of Coronary Artery Disease and Chest Pain      HPI Comments: 60 yo AAM, came in for discharge f/u  PMH CAD nonobstructive per LHC done in 11/16, DM, hTN, HLD and obesity,  -11/2016, Pt was admitted to [facility] for cough with little sputum for 6 weeks. Had severe left chest pain only with cough and sitting up from the bed. No pain with exercise. Denied palpitation, dizziness, orthopnea, PND and syncope..  Troponin up to 0.218 and trending down. Echo showed EF 45% and MPI showed anteroseptal positive. Subsequent LHC showed d2 30% lesion and otherwise demi Dz.   -03/2017, he was admitted to [facility] for acute cholelithiasis, s/p lap aidan. Pt had PAF during the admisssion and has been on amiodarone and Eliquis. BNP was up to 144 from 44 done five months ago.  Today, states that he had LUQ pain in AM, with some nausea, dizziness and sweating. He though some medication taking before breakfast caused the pain.  Orthostatic sign negative in the office.  EKG showed NSR, left axial deviation and LAE.                  Past Medical History:   Diagnosis Date    Arthritis     Asthma     Depression     Diabetes mellitus     Diabetes mellitus, type 2 2000    Elevated PSA     Hypertension        History reviewed. No pertinent surgical history.    Social History   Substance Use Topics    Smoking status: Never Smoker    Smokeless tobacco: None    Alcohol use No       Family History   Problem Relation Age of Onset    Glaucoma Mother     Cataracts Mother     Cataracts Father     Glaucoma Sister     Cataracts Sister     Glaucoma Maternal Aunt     Prostate cancer Neg Hx          Review of Systems   Constitution: Negative for decreased appetite, diaphoresis, fever, weakness, malaise/fatigue and night sweats.   HENT: Negative for headaches and nosebleeds.    Eyes: Negative for blurred vision and double vision.   Cardiovascular: Negative  for chest pain, claudication, dyspnea on exertion, irregular heartbeat, leg swelling, near-syncope, orthopnea, palpitations, paroxysmal nocturnal dyspnea and syncope.   Respiratory: Negative for cough, shortness of breath, sleep disturbances due to breathing, snoring, sputum production and wheezing.    Endocrine: Negative for cold intolerance and polyuria.   Hematologic/Lymphatic: Does not bruise/bleed easily.   Skin: Negative for rash.   Musculoskeletal: Negative for back pain, falls, joint pain, joint swelling and neck pain.   Gastrointestinal: Positive for nausea. Negative for abdominal pain, heartburn and vomiting.   Genitourinary: Negative for dysuria, frequency and hematuria.   Neurological: Negative for difficulty with concentration, dizziness, focal weakness, light-headedness, numbness and seizures.   Psychiatric/Behavioral: Negative for depression, memory loss and substance abuse. The patient does not have insomnia.    Allergic/Immunologic: Negative for HIV exposure and hives.       Objective:   Physical Exam   Constitutional: He is oriented to person, place, and time. He appears well-nourished.   HENT:   Head: Normocephalic.   Eyes: Pupils are equal, round, and reactive to light.   Neck: Normal carotid pulses and no JVD present. Carotid bruit is not present. No thyromegaly present.   Cardiovascular: Normal rate, regular rhythm, normal heart sounds and normal pulses.   No extrasystoles are present. PMI is not displaced.  Exam reveals no gallop and no S3.    No murmur heard.  Pulmonary/Chest: Breath sounds normal. No stridor. No respiratory distress.   Bronchi b/l   Abdominal: Soft. Bowel sounds are normal. There is no tenderness. There is no rebound.   Musculoskeletal: Normal range of motion.   Neurological: He is alert and oriented to person, place, and time.   Skin: Skin is intact. No rash noted.   Psychiatric: His behavior is normal.       Lab Results   Component Value Date    CHOL 189 11/16/2016     Lab  Results   Component Value Date    HDL 42 11/16/2016     Lab Results   Component Value Date    LDLCALC 120.2 11/16/2016     Lab Results   Component Value Date    TRIG 134 11/16/2016     Lab Results   Component Value Date    CHOLHDL 22.2 11/16/2016       Chemistry        Component Value Date/Time     03/24/2017 0442    K 4.2 03/24/2017 0442     03/24/2017 0442    CO2 24 03/24/2017 0442    BUN 8 03/24/2017 0442    CREATININE 0.8 03/24/2017 0442     (H) 03/24/2017 0442        Component Value Date/Time    CALCIUM 8.5 (L) 03/24/2017 0442    ALKPHOS 99 03/28/2017 1440    AST 30 03/28/2017 1440    ALT 37 03/28/2017 1440    BILITOT 0.3 03/28/2017 1440          Lab Results   Component Value Date    TSH 1.345 12/07/2016     Lab Results   Component Value Date    INR 1.0 03/15/2017    INR 1.0 11/16/2016    INR 1.0 11/15/2016     Lab Results   Component Value Date    WBC 12.11 03/28/2017    HGB 13.0 (L) 03/28/2017    HCT 40.3 03/28/2017    MCV 87 03/28/2017     03/28/2017     BMP  Sodium   Date Value Ref Range Status   03/24/2017 136 136 - 145 mmol/L Final     Potassium   Date Value Ref Range Status   03/24/2017 4.2 3.5 - 5.1 mmol/L Final     Chloride   Date Value Ref Range Status   03/24/2017 103 95 - 110 mmol/L Final     CO2   Date Value Ref Range Status   03/24/2017 24 23 - 29 mmol/L Final     BUN, Bld   Date Value Ref Range Status   03/24/2017 8 6 - 20 mg/dL Final     Creatinine   Date Value Ref Range Status   03/24/2017 0.8 0.5 - 1.4 mg/dL Final     Calcium   Date Value Ref Range Status   03/24/2017 8.5 (L) 8.7 - 10.5 mg/dL Final     Anion Gap   Date Value Ref Range Status   03/24/2017 9 8 - 16 mmol/L Final     eGFR if    Date Value Ref Range Status   03/24/2017 >60 >60 mL/min/1.73 m^2 Final     eGFR if non    Date Value Ref Range Status   03/24/2017 >60 >60 mL/min/1.73 m^2 Final     Comment:     Calculation used to obtain the estimated glomerular filtration  rate  (eGFR) is the CKD-EPI equation. Since race is unknown   in our information system, the eGFR values for   -American and Non--American patients are given   for each creatinine result.       CrCl cannot be calculated (Patient has no serum creatinine result on file.).     Assessment:      1. Coronary artery disease involving native coronary artery of native heart without angina pectoris    2. Chronic combined systolic and diastolic congestive heart failure    3. Essential hypertension    4. PAF (paroxysmal atrial fibrillation)    5. 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  TSH, CMP,lipid profile and cXR in 3 months.  continue Eliquis, BB, ACEI, ASA, statin and amlodipine  RTC in 3 months

## 2017-05-01 ENCOUNTER — TELEPHONE (OUTPATIENT)
Dept: CARDIOLOGY | Facility: CLINIC | Age: 62
End: 2017-05-01

## 2017-05-01 DIAGNOSIS — I48.0 PAF (PAROXYSMAL ATRIAL FIBRILLATION): Primary | ICD-10-CM

## 2017-05-10 ENCOUNTER — TELEPHONE (OUTPATIENT)
Dept: INTERNAL MEDICINE | Facility: CLINIC | Age: 62
End: 2017-05-10

## 2017-05-10 NOTE — TELEPHONE ENCOUNTER
----- Message from Keisha Biggs sent at 5/10/2017 10:27 AM CDT -----  Contact: [facility]  [facility] calling on the status of prescription request that they had faxed over.Rx- Lidocaine ointment....[phone]

## 2017-05-15 ENCOUNTER — TELEPHONE (OUTPATIENT)
Dept: INTERNAL MEDICINE | Facility: CLINIC | Age: 62
End: 2017-05-15

## 2017-05-15 NOTE — TELEPHONE ENCOUNTER
Medication is needed for Humalog is no longer covered under insurance and it needs to change to Novalog, Please call her at [phone] ext [phone].

## 2017-05-15 NOTE — TELEPHONE ENCOUNTER
----- Message from Neyda Bashir sent at 5/15/2017 12:24 PM CDT -----  Contact: [facility]  Medication is needed for Humalog is no longer covered under insurance and it needs to change to Novalog, Please call her at [phone] ext [phone].    Thanks  td

## 2017-05-16 RX ORDER — INSULIN ASPART 100 [IU]/ML
20 INJECTION, SOLUTION INTRAVENOUS; SUBCUTANEOUS
Qty: 54 ML | Refills: 3 | Status: SHIPPED | OUTPATIENT
Start: 2017-05-16 | End: 2017-05-17 | Stop reason: SDUPTHER

## 2017-05-17 ENCOUNTER — OFFICE VISIT (OUTPATIENT)
Dept: INTERNAL MEDICINE | Facility: CLINIC | Age: 62
End: 2017-05-17
Payer: MEDICARE

## 2017-05-17 VITALS
HEART RATE: 84 BPM | BODY MASS INDEX: 34.38 KG/M2 | HEIGHT: 65 IN | SYSTOLIC BLOOD PRESSURE: 124 MMHG | WEIGHT: 206.38 LBS | DIASTOLIC BLOOD PRESSURE: 65 MMHG | TEMPERATURE: 97 F

## 2017-05-17 DIAGNOSIS — Z91.89 AT RISK FOR MEDICATION NONCOMPLIANCE: ICD-10-CM

## 2017-05-17 DIAGNOSIS — M79.18 PIRIFORMIS MUSCLE PAIN: Primary | ICD-10-CM

## 2017-05-17 PROCEDURE — 3074F SYST BP LT 130 MM HG: CPT | Mod: S$GLB,,, | Performed by: FAMILY MEDICINE

## 2017-05-17 PROCEDURE — 4010F ACE/ARB THERAPY RXD/TAKEN: CPT | Mod: S$GLB,,, | Performed by: FAMILY MEDICINE

## 2017-05-17 PROCEDURE — 1160F RVW MEDS BY RX/DR IN RCRD: CPT | Mod: S$GLB,,, | Performed by: FAMILY MEDICINE

## 2017-05-17 PROCEDURE — 99999 PR PBB SHADOW E&M-EST. PATIENT-LVL III: CPT | Mod: PBBFAC,,, | Performed by: FAMILY MEDICINE

## 2017-05-17 PROCEDURE — 3045F PR MOST RECENT HEMOGLOBIN A1C LEVEL 7.0-9.0%: CPT | Mod: S$GLB,,, | Performed by: FAMILY MEDICINE

## 2017-05-17 PROCEDURE — 99214 OFFICE O/P EST MOD 30 MIN: CPT | Mod: S$GLB,,, | Performed by: FAMILY MEDICINE

## 2017-05-17 PROCEDURE — 3078F DIAST BP <80 MM HG: CPT | Mod: S$GLB,,, | Performed by: FAMILY MEDICINE

## 2017-05-17 RX ORDER — INSULIN ASPART 100 [IU]/ML
20 INJECTION, SOLUTION INTRAVENOUS; SUBCUTANEOUS
Qty: 18 ML | Refills: 0 | Status: SHIPPED | OUTPATIENT
Start: 2017-05-17 | End: 2017-06-15 | Stop reason: SDUPTHER

## 2017-05-17 RX ORDER — ALBUTEROL SULFATE 90 UG/1
AEROSOL, METERED RESPIRATORY (INHALATION)
COMMUNITY
Start: 2017-05-05 | End: 2018-09-11 | Stop reason: SDUPTHER

## 2017-05-17 RX ORDER — TRAMADOL HYDROCHLORIDE 50 MG/1
50 TABLET ORAL EVERY 6 HOURS PRN
Qty: 40 TABLET | Refills: 0 | Status: SHIPPED | OUTPATIENT
Start: 2017-05-17 | End: 2017-05-27

## 2017-05-17 RX ORDER — TIZANIDINE 2 MG/1
4 TABLET ORAL EVERY 6 HOURS PRN
Qty: 40 TABLET | Refills: 0 | Status: SHIPPED | OUTPATIENT
Start: 2017-05-17 | End: 2017-05-27

## 2017-05-17 NOTE — PATIENT INSTRUCTIONS
Hip Rotation (Flexibility)    These instructions are for the right hip. Switch sides for your left hip.  1. Lie on your back on the floor, with your knees bent and feet flat on the floor. Dont press your neck or lower back to the floor.  2. Rest your right ankle on your left knee.  3. Place a towel around the back of your left thigh. Pull on the ends of the towel to pull your left knee toward your chest. Feel the stretch in your buttocks.  4. Hold for 30 to 60 seconds. Lower your leg back down.  5. Repeat 2 times, or as instructed.  6. Switch legs and repeat.   7. Do this 3 times a day, or as instructed.  Date Last Reviewed: 3/10/2016  © 6435-2395 [employer]. [address] [place] [address], [place] [address]. All rights reserved. This information is not intended as a substitute for professional medical care. Always follow your healthcare professional's instructions.

## 2017-05-17 NOTE — PROGRESS NOTES
Subjective:       Patient ID: Crow Green is a 61 y.o. male.    Chief Complaint: Hip Pain (both hips)    HPI   patient is here today with pain in both hips that has been worsening over the last couple weeks.  He has pain whether he is sitting down but it is exacerbated when he is walking or in certain positions.  He has been taking some ibuprofen and took one of his friends pain medications which helped somewhat.  He is having difficulty walking due to this which is making exercise difficult.  Describes the pain as originating in the buttocks of both sides and radiates down to his calves.  Does not radiate to his feet.  Not having any incontinence issues.  Did not have any injury.  No trauma  Also states that his glucose has been running a bit high.  He has run out of Humalog due to insurance not covering this.  I recently sent a refill of NovoLog yesterday as I was only notified yesterday of this change.  At the last time he was seen by diabetes management he was instructed on how to adjust his pre-meal insulin but he was not given a printout and was not sure how to do this.  Since he has been out of the Humalog he has been sometimes taking 50 units of Lantus 3 times a day.  He was unaware that this is not appropriate.  He has not been having any hypoglycemia    Family History   Problem Relation Age of Onset    Glaucoma Mother     Cataracts Mother     Cataracts Father     Glaucoma Sister     Cataracts Sister     Glaucoma Maternal Aunt     Prostate cancer Neg Hx        Current Outpatient Prescriptions:     amiodarone (PACERONE) 200 MG Tab, Take 1 tablet (200 mg total) by mouth once daily., Disp: 30 tablet, Rfl: 11    amlodipine (NORVASC) 10 MG tablet, Take 1 tablet (10 mg total) by mouth once daily., Disp: 90 tablet, Rfl: 3    apixaban 5 mg Tab, Take 1 tablet (5 mg total) by mouth 2 (two) times daily., Disp: 60 tablet, Rfl: 1    aspirin (ECOTRIN) 81 MG EC tablet, Take 1 tablet (81 mg total) by  "mouth once daily., Disp: , Rfl: 0    atorvastatin (LIPITOR) 40 MG tablet, Take 1 tablet (40 mg total) by mouth every evening., Disp: 90 tablet, Rfl: 3    blood sugar diagnostic Strp, 1 strip by Misc.(Non-Drug; Combo Route) route 6 (six) times daily., Disp: 160 strip, Rfl: 11    fluticasone-salmeterol 100-50 mcg/dose (ADVAIR DISKUS) 100-50 mcg/dose diskus inhaler, Inhale 1 puff into the lungs 2 (two) times daily. Controller, Disp: 180 each, Rfl: 3    gabapentin (NEURONTIN) 300 MG capsule, Take 1 capsule (300 mg total) by mouth 3 (three) times daily., Disp: 270 capsule, Rfl: 3    insulin aspart (NOVOLOG FLEXPEN) 100 unit/mL InPn pen, Inject 20 Units into the skin 3 (three) times daily with meals., Disp: 18 mL, Rfl: 0    insulin glargine (LANTUS SOLOSTAR) 100 unit/mL (3 mL) InPn pen, Inject 50 Units into the skin every evening., Disp: 2 Box, Rfl: 11    insulin syringe-needle U-100 1 mL 31 gauge x 5/16 Syrg, , Disp: , Rfl:     latanoprost 0.005 % ophthalmic solution, Place 1 drop into both eyes every evening., Disp: 1 Bottle, Rfl: 4    lisinopril (PRINIVIL,ZESTRIL) 5 MG tablet, Take 1 tablet (5 mg total) by mouth once daily., Disp: 90 tablet, Rfl: 2    metformin (GLUCOPHAGE) 1000 MG tablet, Take 1 tablet (1,000 mg total) by mouth 2 (two) times daily with meals., Disp: 180 tablet, Rfl: 3    metoprolol succinate (TOPROL-XL) 50 MG 24 hr tablet, TAKE 1 TABLET EVERY DAY, Disp: 30 tablet, Rfl: 5    nitroGLYCERIN (NITROSTAT) 0.3 MG SL tablet, Place 1 tablet (0.3 mg total) under the tongue every 5 (five) minutes as needed for Chest pain. No more than 3 tablets in one day., Disp: 30 tablet, Rfl: 1    pantoprazole (PROTONIX) 40 MG tablet, Take 1 tablet (40 mg total) by mouth once daily., Disp: 30 tablet, Rfl: 11    pen needle, diabetic 31 gauge x 5/16" Ndle, Inject 1 pen into the skin 2 (two) times daily. Use one 2 times a day, Disp: 100 each, Rfl: 3    sertraline (ZOLOFT) 50 MG tablet, Take 1 tablet (50 mg total) " "by mouth once daily., Disp: 90 tablet, Rfl: 3    tiotropium (SPIRIVA WITH HANDIHALER) 18 mcg inhalation capsule, Inhale 1 capsule (18 mcg total) into the lungs once daily. Controller, Disp: 90 capsule, Rfl: 3    TRUE METRIX AIR GLUCOSE METER kit, TEST FOUR TIMES DAILY BEFORE MEALS  AND EVERY NIGHT, Disp: 1 each, Rfl: 0    VENTOLIN HFA 90 mcg/actuation inhaler, , Disp: , Rfl:     tizanidine (ZANAFLEX) 2 MG tablet, Take 2 tablets (4 mg total) by mouth every 6 (six) hours as needed., Disp: 40 tablet, Rfl: 0    tramadol (ULTRAM) 50 mg tablet, Take 1 tablet (50 mg total) by mouth every 6 (six) hours as needed for Pain., Disp: 40 tablet, Rfl: 0    Review of Systems   Constitutional: Negative for fever.   Respiratory: Negative for shortness of breath.    Cardiovascular: Negative for leg swelling.   Genitourinary: Negative for difficulty urinating and dysuria.   Musculoskeletal: Positive for arthralgias, back pain and gait problem.       Objective:   /65  Pulse 84  Temp 96.7 °F (35.9 °C)  Ht 5' 5" (1.651 m)  Wt 93.6 kg (206 lb 5.6 oz)  BMI 34.34 kg/m2     Physical Exam   Constitutional: He is oriented to person, place, and time. He appears well-developed and well-nourished.   HENT:   Head: Normocephalic and atraumatic.   Eyes: Conjunctivae are normal.   Cardiovascular: Normal rate.    Pulmonary/Chest: Effort normal. No respiratory distress.   Musculoskeletal: He exhibits no edema.   Neurological: He is alert and oriented to person, place, and time. Coordination normal.   Skin: Skin is warm and dry. No rash noted.   Psychiatric: He has a normal mood and affect. His behavior is normal.   Vitals reviewed.      Assessment & Plan     1. Piriformis muscle pain  The cause of this is likely secondary to deconditioning and lack of muscle flexibility.  He has regained about 10 pounds over the last few months and continues to have abdominal obesity which puts a strain on his lower back and this likely led to this " problem.  I encouraged him to do any kind of exercise that he can whether it is lifting weights with his upper body.  He says he plans to go inquire about joining the gym so that he can get into water aerobics and use some of the other machines as tolerated.  I encouraged him to try improving his flexibility but in the meantime can take Flexeril, ibuprofen and tramadol for the acute problem that he is having.    2. 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, with long-term current use of insulin  It is difficult for him to control his diabetes due to not understanding the types of insulin.  He has not been adhering to a good diet.  Not exercising enough due to musculoskeletal problems.  Also reports that he cannot see very well and says that he has to go back to the eye doctor for further evaluation.  He has someone that organizes his medicine for him and places it in a medicine tray.  I reemphasized the way that he needs to take insulin being once a day and checking his fasting numbers so that we can titrate based on that.  Also reprinted the progress note from the last time he was seen by diabetes management so he would have the recommended pre-meal adjustment schedule.  A 3 month supply of NovoLog was sent to [facility] by me within the last 2 days but I will send a 30 month supply to local pharmacy so that he can begin taking this again today.    3. At risk for medication noncompliance  As above

## 2017-05-17 NOTE — MR AVS SNAPSHOT
[facility] Medicine  [address] [address] [address]  Phone: [phone]                  Crow Green   2017 11:20 AM   Office Visit    Description:  Male : 1955   Provider:  Mateo Lambert DO   Department:  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 [phone]      Goals (5 Years of Data)     None       These Medications        Disp Refills Start End    tramadol (ULTRAM) 50 mg tablet 40 tablet 0 2017    Take 1 tablet (50 mg total) by mouth every 6 (six) hours as needed for Pain. - Oral    Pharmacy: [facility] [address] [place] [address] [facility] [address] Ph #: [phone]       tizanidine (ZANAFLEX) 2 MG tablet 40 tablet 0 2017    Take 2 tablets (4 mg total) by mouth every 6 (six) hours as needed. - Oral    Pharmacy: [facility] [place]  [facility] [address] Ph #: [phone]       insulin aspart (NOVOLOG FLEXPEN) 100 unit/mL InPn pen 18 mL 0 2017    Inject 20 Units into the skin 3 (three) times daily with meals. - Subcutaneous    Pharmacy: [facility] [place] [id number] [address] [facility]  [address] Ph #: [phone]         [facility]     [facility] Care [facility] - 24/7 Assistance  Unless otherwise directed by your provider, please contact [facility], our nurse care line that is available for 24/7 assistance.     Registered nurses in the [facility] provide: appointment scheduling, clinical advisement, health education, and other advisory services.  Call: [phone] (toll free)               Medications           START taking these NEW medications        Refills    tramadol (ULTRAM) 50 mg tablet 0    Sig: Take 1 tablet (50 mg total) by mouth every 6 (six) hours as needed for Pain.    Class: Normal    Route: Oral    tizanidine (ZANAFLEX) 2 MG tablet 0    Sig: Take 2 tablets (4 mg total) by mouth every 6 (six) hours as needed.    Class: Normal    Route: Oral           Verify that the below list of medications is an accurate representation of the medications you are currently taking.  If none reported, the list may be blank. If incorrect, please contact your healthcare provider. Carry this list with you in case of emergency.           Current Medications     amiodarone (PACERONE) 200 MG Tab Take 1 tablet (200 mg total) by mouth once daily.    amlodipine (NORVASC) 10 MG tablet Take 1 tablet (10 mg total) by mouth once daily.    apixaban 5 mg Tab Take 1 tablet (5 mg total) by mouth 2 (two) times daily.    aspirin (ECOTRIN) 81 MG EC tablet Take 1 tablet (81 mg total) by mouth once daily.    atorvastatin (LIPITOR) 40 MG tablet Take 1 tablet (40 mg total) by mouth every evening.    blood sugar diagnostic Strp 1 strip by Misc.(Non-Drug; Combo Route) route 6 (six) times daily.    fluticasone-salmeterol 100-50 mcg/dose (ADVAIR DISKUS) 100-50 mcg/dose diskus inhaler Inhale 1 puff into the lungs 2 (two) times daily. Controller    gabapentin (NEURONTIN) 300 MG capsule Take 1 capsule (300 mg total) by mouth 3 (three) times daily.    insulin aspart (NOVOLOG FLEXPEN)  "100 unit/mL InPn pen Inject 20 Units into the skin 3 (three) times daily with meals.    insulin glargine (LANTUS SOLOSTAR) 100 unit/mL (3 mL) InPn pen Inject 50 Units into the skin every evening.    insulin syringe-needle U-100 1 mL 31 gauge x 5/16 Syrg     latanoprost 0.005 % ophthalmic solution Place 1 drop into both eyes every evening.    lisinopril (PRINIVIL,ZESTRIL) 5 MG tablet Take 1 tablet (5 mg total) by mouth once daily.    metformin (GLUCOPHAGE) 1000 MG tablet Take 1 tablet (1,000 mg total) by mouth 2 (two) times daily with meals.    metoprolol succinate (TOPROL-XL) 50 MG 24 hr tablet TAKE 1 TABLET EVERY DAY    nitroGLYCERIN (NITROSTAT) 0.3 MG SL tablet Place 1 tablet (0.3 mg total) under the tongue every 5 (five) minutes as needed for Chest pain. No more than 3 tablets in one day.    pantoprazole (PROTONIX) 40 MG tablet Take 1 tablet (40 mg total) by mouth once daily.    pen needle, diabetic 31 gauge x 5/16" Ndle Inject 1 pen into the skin 2 (two) times daily. Use one 2 times a day    sertraline (ZOLOFT) 50 MG tablet Take 1 tablet (50 mg total) by mouth once daily.    tiotropium (SPIRIVA WITH HANDIHALER) 18 mcg inhalation capsule Inhale 1 capsule (18 mcg total) into the lungs once daily. Controller    TRUE METRIX AIR GLUCOSE METER kit TEST FOUR TIMES DAILY BEFORE MEALS  AND EVERY NIGHT    VENTOLIN HFA 90 mcg/actuation inhaler     tizanidine (ZANAFLEX) 2 MG tablet Take 2 tablets (4 mg total) by mouth every 6 (six) hours as needed.    tramadol (ULTRAM) 50 mg tablet Take 1 tablet (50 mg total) by mouth every 6 (six) hours as needed for Pain.           Clinical Reference Information           Your Vitals Were     BP Pulse Temp Height Weight BMI    124/65 84 96.7 °F (35.9 °C) 5' 5" (1.651 m) 93.6 kg (206 lb 5.6 oz) 34.34 kg/m2      Blood Pressure          Most Recent Value    BP  124/65      Allergies as of 5/17/2017     No Known Allergies      Immunizations Administered on Date of Encounter - 5/17/2017     " None      [facility] Sign-Up     Activating your [facility] account is as easy as 1-2-3!     1) Visit [email or web address], select Sign Up Now, enter this activation code and your date of birth, then select Next.  Activation code not generated  Current Patient Portal Status: Account disabled      2) Create a username and password to use when you visit [facility] in the future and select a security question in case you lose your password and select Next.    3) Enter your e-mail address and click Sign Up!    Additional Information  If you have questions, please e-mail [email or web address] or call [phone] to talk to our [email or web address] staff. Remember, [facility] is NOT to be used for urgent needs. For medical emergencies, dial 911.         Instructions      Hip Rotation (Flexibility)    These instructions are for the right hip. Switch sides for your left hip.  1. Lie on your back on the floor, with your knees bent and feet flat on the floor. Dont press your neck or lower back to the floor.  2. Rest your right ankle on your left knee.  3. Place a towel around the back of your left thigh. Pull on the ends of the towel to pull your left knee toward your chest. Feel the stretch in your buttocks.  4. Hold for 30 to 60 seconds. Lower your leg back down.  5. Repeat 2 times, or as instructed.  6. Switch legs and repeat.   7. Do this 3 times a day, or as instructed.  Date Last Reviewed: 3/10/2016  © 4451-9947 [employer]. [address] [place] [address]. All rights reserved. This information is not intended as a substitute for professional medical care. Always follow your healthcare professional's instructions.             Language Assistance Services     ATTENTION: Language assistance services are available, free of charge. Please call [phone].      ATENCIÓN: Si patriciala julien, tiene a garcia disposición servicios gratuitos de asistencia lingüística. Llame al [phone].     CHÚ Ý: N?u b?n nói Ti?ng Vi?t,  có các d?ch v? h? tr? ngôn ng? mi?n phí dành cho b?n. G?i s? [phone].         [facility] complies with applicable Federal civil rights laws and does not discriminate on the basis of race, color, national origin, age, disability, or sex.

## 2017-05-23 ENCOUNTER — TELEPHONE (OUTPATIENT)
Dept: INTERNAL MEDICINE | Facility: CLINIC | Age: 62
End: 2017-05-23

## 2017-05-23 RX ORDER — NAPROXEN 500 MG/1
500 TABLET ORAL 2 TIMES DAILY
Qty: 10 TABLET | Refills: 0 | Status: SHIPPED | OUTPATIENT
Start: 2017-05-23 | End: 2017-05-28

## 2017-05-23 NOTE — TELEPHONE ENCOUNTER
Sent naproxen 500mg to be taken BID for 5 days. If this is not effective he will need to follow up .

## 2017-05-23 NOTE — TELEPHONE ENCOUNTER
----- Message from Kimmie Cruzhn sent at 5/23/2017  1:21 PM CDT -----  Pt at [phone]//states he saw Dr Lambert on 5-17-17//for knee/leg/etc pain/the med is  Not helping//would like to know if possible to get a different med//uses//[facility] [place]//please call/thanks/lh

## 2017-05-23 NOTE — TELEPHONE ENCOUNTER
Pt called requesting something else be sent over for pain. lov 5/17/17. Pt states current meds not relieving pain. Please advise

## 2017-05-30 ENCOUNTER — OFFICE VISIT (OUTPATIENT)
Dept: PULMONOLOGY | Facility: CLINIC | Age: 62
End: 2017-05-30
Payer: MEDICARE

## 2017-05-30 VITALS
DIASTOLIC BLOOD PRESSURE: 80 MMHG | BODY MASS INDEX: 32.34 KG/M2 | RESPIRATION RATE: 20 BRPM | HEART RATE: 88 BPM | SYSTOLIC BLOOD PRESSURE: 140 MMHG | HEIGHT: 66 IN | OXYGEN SATURATION: 96 % | WEIGHT: 201.25 LBS

## 2017-05-30 DIAGNOSIS — G47.33 OSA ON CPAP: Primary | ICD-10-CM

## 2017-05-30 DIAGNOSIS — E66.9 OBESITY (BMI 30.0-34.9): ICD-10-CM

## 2017-05-30 DIAGNOSIS — J45.41 MODERATE PERSISTENT ASTHMA WITH ACUTE EXACERBATION: ICD-10-CM

## 2017-05-30 PROBLEM — E66.811 OBESITY (BMI 30.0-34.9): Status: ACTIVE | Noted: 2017-05-30

## 2017-05-30 PROCEDURE — 99213 OFFICE O/P EST LOW 20 MIN: CPT | Mod: S$GLB,,, | Performed by: NURSE PRACTITIONER

## 2017-05-30 PROCEDURE — 99999 PR PBB SHADOW E&M-EST. PATIENT-LVL IV: CPT | Mod: PBBFAC,,, | Performed by: NURSE PRACTITIONER

## 2017-05-30 NOTE — ASSESSMENT & PLAN NOTE
PS2017 significant for severe sleep apnea with .9  CPAP titration 2017 CPAP 14 cm adequate.   2/10/2017 initial order for CPAP 14 cm.  Patient remains not compliant with CPAP use.   0.0% 4 hr or greater. On CPAP 14cm AHI is 3.1  Mask not fitting properly is reported reason for non compliance.  Provided [facility] contact info for mask fitting.   Continue to work on being compliant with CPAP use.   Follow up 2017, plan remote download in 4 weeks by [facility].

## 2017-05-30 NOTE — ASSESSMENT & PLAN NOTE
Controlled on current regimen, advair 1 puff twice daily.  spriva 18 mcg once daily and albuterol inhaler.   Using albuterol about once a day.   ACT: 21

## 2017-05-30 NOTE — PROGRESS NOTES
Subjective:      Patient ID: Crow Green is a 61 y.o. male.    Chief Complaint: Sleep Apnea    HPI: Crow Green is  here for follow up for SANDRITA and CPAP complaince assessment.  He is on CPAP  of 14 cmH2O pressure. He is not compliant with CPAP use.   Complaince download today reveals 0.0% of days with greater than 4 hours of device use.   Patient reports he continues to have trouble keeping his mask on his face. He has tried 2 mask.    Previous Report Reviewed: office notes and PSG reports.      The following portions of the patient's history were reviewed and updated as appropriate: allergies, current medications, past family history, past medical history, past social history, past surgical history and problem list.    Review of Systems   Constitutional: Negative for fever, chills, weight loss, weight gain, activity change, appetite change, fatigue and night sweats.   HENT: Negative for postnasal drip, rhinorrhea, sinus pressure, voice change and congestion.    Eyes: Negative for redness and itching.   Respiratory: Negative for snoring, cough, sputum production, chest tightness, shortness of breath, wheezing, orthopnea, asthma nighttime symptoms, dyspnea on extertion, use of rescue inhaler and somnolence.    Cardiovascular: Negative for chest pain, palpitations and leg swelling.   Genitourinary: Negative for difficulty urinating and hematuria.   Endocrine: Negative for polydipsia, polyphagia, cold intolerance, heat intolerance and polyuria.    Musculoskeletal: Negative for arthralgias, back pain, gait problem, joint swelling and myalgias.   Skin: Negative.    Gastrointestinal: Negative for nausea, vomiting, abdominal pain and acid reflux.   Neurological: Negative for dizziness, weakness, light-headedness and headaches.   Hematological: Negative for adenopathy. No excessive bruising.   Psychiatric/Behavioral: Negative for sleep disturbance. The patient is not nervous/anxious.       Objective:  "    Physical Exam   Constitutional: He is oriented to person, place, and time. Vital signs are normal. He appears well-developed and well-nourished. He is active and cooperative.  Non-toxic appearance. He does not appear ill. No distress.   HENT:   Head: Normocephalic and atraumatic.   Nose: Nose normal.   Mouth/Throat: Oropharynx is clear and moist. No oropharyngeal exudate.   Eyes: Conjunctivae are normal.   Neck: Normal range of motion. Neck supple.   Cardiovascular: Normal rate, regular rhythm, normal heart sounds and intact distal pulses.    Pulmonary/Chest: Effort normal and breath sounds normal.   Abdominal: Soft.   Musculoskeletal: Normal range of motion.   Neurological: He is alert and oriented to person, place, and time.   Skin: Skin is warm and dry.   Psychiatric: He has a normal mood and affect. His behavior is normal. Judgment and thought content normal.   Vitals reviewed.    Vitals:    05/30/17 0833   BP: (!) 140/80   Pulse: 88   Resp: 20   SpO2: 96%   Weight: 91.3 kg (201 lb 4.5 oz)   Height: 5' 6" (1.676 m)     body mass index is 32.49 kg/m².    Personal Diagnostic Review    CPAP download  CPAP 14 cm  Compliance Summary  2/17/2017 - 4/21/2017 (64 days)  Days with Device Usage 15 days  Days without Device Usage 49 days  Percent Days with Device Usage 23.4%  Cumulative Usage 3 hrs. 55 mins. 25 secs.  Maximum Usage (1 Day) 42 mins. 41 secs.  Average Usage (All Days) 3 mins. 40 secs.  Average Usage (Days Used) 15 mins. 41 secs.  Minimum Usage (1 Day) 28 secs.  Percent of Days with Usage >= 4 Hours 0.0%  Percent of Days with Usage < 4 Hours 100.0%  Date Range  Total Blower Time 1 day 13 hrs. 44 mins. 9 secs.  Sleep Therapy Statistics  Average Time in Large Leak Per Day 1 mins. 28 secs.  Average AHI 3.1  CPAP 14.0 cmH2O  Patient benefits and is compliant      Assessment:     1. SANDRITA on CPAP    2. Obesity (BMI 30.0-34.9)    3. Moderate persistent asthma with acute exacerbation        No orders of the defined " types were placed in this encounter.    Problem List Items Addressed This Visit     Asthma     Controlled on current regimen, advair 1 puff twice daily.  spriva 18 mcg once daily and albuterol inhaler.   Using albuterol about once a day.   ACT: 21          Obesity (BMI 30.0-34.9)     Encouraged calorie reduction and 30 -60 minutes of exercise daily. Discussed impact of obesity on general health and sleep.             SANDRITA on CPAP - Primary     PS2017 significant for severe sleep apnea with .9  CPAP titration 2017 CPAP 14 cm adequate.   2/10/2017 initial order for CPAP 14 cm.  Patient remains not compliant with CPAP use.   0.0% 4 hr or greater. On CPAP 14cm AHI is 3.1  Mask not fitting properly is reported reason for non compliance.  Provided [facility] contact info for mask fitting.   Continue to work on being compliant with CPAP use.   Follow up 2017, plan remote download in 4 weeks by [facility].            Other Visit Diagnoses    None.       Plan:     SANDRITA on CPAP  PS2017 significant for severe sleep apnea with .9  CPAP titration 2017 CPAP 14 cm adequate.   2/10/2017 initial order for CPAP 14 cm.  Patient remains not compliant with CPAP use.   0.0% 4 hr or greater. On CPAP 14cm AHI is 3.1  Mask not fitting properly is reported reason for non compliance.  Provided [facility] contact info for mask fitting.   Continue to work on being compliant with CPAP use.   Follow up 2017, plan remote download in 4 weeks by [facility].     Obesity (BMI 30.0-34.9)  Encouraged calorie reduction and 30 -60 minutes of exercise daily. Discussed impact of obesity on general health and sleep.        Asthma  Controlled on current regimen, advair 1 puff twice daily.  spriva 18 mcg once daily and albuterol inhaler.   Using albuterol about once a day.   ACT: 21      ([facility]). Reviewed therapeutic goals for positive airway pressure therapy CPAP  Ideal is usage 100% of nights for 6  - 8 hours per night. Minimum usage is 70% of night for at least 4 hours per night used. Pateint expressed understanding.     Return in about 6 weeks (around 7/12/2017) for Asthma follow up, w/review cpft.  cpap compliance download related to prior not compliant. .

## 2017-05-30 NOTE — ASSESSMENT & PLAN NOTE
Encouraged calorie reduction and 30 -60 minutes of exercise daily. Discussed impact of obesity on general health and sleep.

## 2017-06-13 ENCOUNTER — TELEPHONE (OUTPATIENT)
Dept: INTERNAL MEDICINE | Facility: CLINIC | Age: 62
End: 2017-06-13

## 2017-06-13 ENCOUNTER — OFFICE VISIT (OUTPATIENT)
Dept: OPHTHALMOLOGY | Facility: CLINIC | Age: 62
End: 2017-06-13
Payer: MEDICARE

## 2017-06-13 DIAGNOSIS — H40.1194 INDETERMINATE STAGE CHRONIC OPEN ANGLE GLAUCOMA: ICD-10-CM

## 2017-06-13 PROCEDURE — 99999 PR PBB SHADOW E&M-EST. PATIENT-LVL II: CPT | Mod: PBBFAC,,, | Performed by: OPTOMETRIST

## 2017-06-13 PROCEDURE — 92012 INTRM OPH EXAM EST PATIENT: CPT | Mod: S$GLB,,, | Performed by: OPTOMETRIST

## 2017-06-13 NOTE — PROGRESS NOTES
HPI     Last [facility] visit 03/08/2017  3 month IOP   Glaucoma  Medication: Latanoprost 0.005%  Last Dilated 12/07/2017  Last HVF/GOCT 03/08/2017    Diabetic with BS fluctuating in the 400's   BS this morning 104    Last edited by Kemal Bashir MA on 6/13/2017  9:39 AM. (History)            Assessment /Plan     For exam results, see Encounter Report.    Indeterminate stage chronic open angle glaucoma      Good IOP control on latanoprost.  Continue with drops.  RTC 3 months for IOP check.  Will repeat full testing in 6 months.

## 2017-06-13 NOTE — TELEPHONE ENCOUNTER
[facility] pt has exhausted his medical transportation but will need extensions due to upcoming appointments in July. [facility] provider office has to seek approval. Called [employer] @ [phone]. Spoke with representative who stated I need to call [employer] @ [phone]. Will call in the morning.

## 2017-06-13 NOTE — TELEPHONE ENCOUNTER
----- Message from Georgette May sent at 6/13/2017  3:35 PM CDT -----  Contact: [facility]  Please call about service dates of services for up coming services. Please call at [phone] Ext [phone].

## 2017-06-14 ENCOUNTER — CLINICAL SUPPORT (OUTPATIENT)
Dept: DIABETES | Facility: CLINIC | Age: 62
End: 2017-06-14
Payer: MEDICARE

## 2017-06-14 DIAGNOSIS — E11.3299 TYPE 2 DIABETES MELLITUS WITH MILD NONPROLIFERATIVE RETINOPATHY, WITH LONG-TERM CURRENT USE OF INSULIN, MACULAR EDEMA PRESENCE UNSPECIFIED, UNSPECIFIED LATERALITY: ICD-10-CM

## 2017-06-14 DIAGNOSIS — Z79.4 TYPE 2 DIABETES MELLITUS WITH MILD NONPROLIFERATIVE RETINOPATHY, WITH LONG-TERM CURRENT USE OF INSULIN, MACULAR EDEMA PRESENCE UNSPECIFIED, UNSPECIFIED LATERALITY: ICD-10-CM

## 2017-06-14 PROCEDURE — G0109 DIAB MANAGE TRN IND/GROUP: HCPCS | Mod: S$GLB,,, | Performed by: DIETITIAN, REGISTERED

## 2017-06-14 PROCEDURE — 99999 PR PBB SHADOW E&M-EST. PATIENT-LVL III: CPT | Mod: PBBFAC,,,

## 2017-06-14 RX ORDER — PEN NEEDLE, DIABETIC 30 GX3/16"
1 NEEDLE, DISPOSABLE MISCELLANEOUS 3 TIMES DAILY
Qty: 90 EACH | Refills: 11 | Status: SHIPPED | OUTPATIENT
Start: 2017-06-14 | End: 2019-08-06 | Stop reason: SDUPTHER

## 2017-06-14 NOTE — PROGRESS NOTES
Diabetes Education  Author: Carmela Hutchison RD, CDE  Date: 6/14/2017    Diabetes Education Visit  Diabetes Education Record Assessment/Progress: Comprehensive/Group    Diabetes Type  Diabetes Type : Type II    Diabetes History  Diabetes Diagnosis: >10 years    Nutrition  Meal Planning: 3 meals per day, drinks regular soda, water    Monitoring   Self Monitoring : has meter; no routine  Blood Glucose Logs: No    Exercise   Frequency: Never    Current Diabetes Treatment   Current Treatment: Insulin, Oral Medication, Diet    Social History  Preferred Learning Method: Group Education  Primary Support: Self  Occupation: disability  Smoking Status: Never a Smoker  Alcohol Use: Never                             Barriers to Change  Barriers to Change: None  Learning Challenges : Vision  Vision - further explanation: visually impaired    Readiness to Learn   Readiness to Learn : Acceptance    Cultural Influences  Cultural Influences: No    Diabetes Education Assessment/Progress  Acute Complications (preventing, detecting, and treating acute complications): Class, Written Materials Provided  Chronic Complications (preventing, detecting, and treating chronic complications): Class, Written Materials Provided  Diabetes Disease Process (diabetes disease process and treatment options): Class, Written Materials Provided  Nutrition (Incorporating nutritional management into one's lifestyle): Class, Written Materials Provided  Physical Activity (incorporating physical activity into one's lifestyle): Class, Written Materials Provided  Medications (states correct name, dose, onset, peak, duration, side effects & timing of meds): Class, Written Materials Provided  Monitoring (monitoring blood glucose/other parameters & using results): Class, Written Materials Provided  Goal Setting and Problem Solving (verbalizes behavior change strategies & sets realistic goals): Class, Written Materials Provided  Behavior Change (developing personal  strategies to health & behavior change): Written Materials Provided, Class  Psychosocial Issues (developing personal srategies to address psychosocial concerns): Class, Written Materials Provided    Goals  Monitoring: Set  Start Date: 06/14/17 (I will log my blood sugars every day)  Target Date: 09/07/17         Diabetes Care Plan/Intervention  Education Plan/Intervention: Individual Follow-Up DSMT         Education Units of Time   Time Spent: 120 min      Health Maintenance Topics with due status: Not Due       Topic Last Completion Date    Lipid Panel 11/16/2016    Influenza Vaccine 11/18/2016    Pneumococcal PPSV23 (Medium Risk) 12/13/2016    Hemoglobin A1c 03/16/2017    Foot Exam 04/19/2017    Eye Exam 06/13/2017     Health Maintenance Due   Topic Date Due    Hepatitis C Screening  1955    TETANUS VACCINE  06/20/1975    Colonoscopy  06/20/2007

## 2017-06-15 RX ORDER — INSULIN ASPART 100 [IU]/ML
INJECTION, SOLUTION INTRAVENOUS; SUBCUTANEOUS
Qty: 15 ML | Refills: 0 | Status: SHIPPED | OUTPATIENT
Start: 2017-06-15 | End: 2019-03-19 | Stop reason: SDUPTHER

## 2017-06-16 ENCOUNTER — OFFICE VISIT (OUTPATIENT)
Dept: INTERNAL MEDICINE | Facility: CLINIC | Age: 62
End: 2017-06-16
Payer: MEDICARE

## 2017-06-16 ENCOUNTER — HOSPITAL ENCOUNTER (OUTPATIENT)
Dept: RADIOLOGY | Facility: HOSPITAL | Age: 62
Discharge: HOME | End: 2017-06-16
Attending: NURSE PRACTITIONER
Payer: MEDICARE

## 2017-06-16 VITALS
HEART RATE: 94 BPM | DIASTOLIC BLOOD PRESSURE: 80 MMHG | TEMPERATURE: 96 F | WEIGHT: 202.63 LBS | SYSTOLIC BLOOD PRESSURE: 123 MMHG | BODY MASS INDEX: 32.7 KG/M2

## 2017-06-16 DIAGNOSIS — G57.01 PIRIFORMIS SYNDROME, RIGHT: ICD-10-CM

## 2017-06-16 DIAGNOSIS — M76.31 IT BAND SYNDROME, RIGHT: Primary | ICD-10-CM

## 2017-06-16 DIAGNOSIS — M25.551 RIGHT HIP PAIN: ICD-10-CM

## 2017-06-16 PROCEDURE — 99999 PR PBB SHADOW E&M-EST. PATIENT-LVL V: CPT | Mod: PBBFAC,,, | Performed by: NURSE PRACTITIONER

## 2017-06-16 PROCEDURE — 73502 X-RAY EXAM HIP UNI 2-3 VIEWS: CPT | Mod: 26,RT,, | Performed by: RADIOLOGY

## 2017-06-16 PROCEDURE — 73502 X-RAY EXAM HIP UNI 2-3 VIEWS: CPT | Mod: TC,PO,RT

## 2017-06-16 PROCEDURE — 99213 OFFICE O/P EST LOW 20 MIN: CPT | Mod: S$GLB,,, | Performed by: NURSE PRACTITIONER

## 2017-06-16 NOTE — PROGRESS NOTES
Subjective:       Patient ID: Crow Green is a 61 y.o. male.    Chief Complaint: Hip Pain (right)    Hip Pain    The incident occurred more than 1 week ago. There was no injury mechanism. The pain is present in the right thigh and right hip. The quality of the pain is described as aching and burning. The pain is severe. The pain has been worsening since onset. Pertinent negatives include no inability to bear weight, loss of motion, loss of sensation, muscle weakness, numbness or tingling. The symptoms are aggravated by weight bearing, palpation and movement. He has tried rest (tramadol, muscle relaxer, NSAID) for the symptoms. The treatment provided no relief.     Review of Systems   Constitutional: Negative.    HENT: Negative.    Respiratory: Negative.    Cardiovascular: Negative.    Musculoskeletal: Positive for arthralgias and myalgias.   Neurological: Negative.  Negative for tingling and numbness.   Psychiatric/Behavioral: Negative.        Objective:      Physical Exam   Constitutional: He is oriented to person, place, and time. He appears well-developed and well-nourished. No distress.   HENT:   Head: Normocephalic and atraumatic.   Eyes: Conjunctivae are normal.   Cardiovascular: Normal rate and regular rhythm.    Pulmonary/Chest: Effort normal. No respiratory distress.   Musculoskeletal: He exhibits no edema.        Right hip: He exhibits tenderness. He exhibits normal range of motion, normal strength, no bony tenderness and no crepitus.        Right knee: Normal.        Legs:  Neurological: He is alert and oriented to person, place, and time. Coordination normal.   Skin: Skin is warm and dry. No rash noted. He is not diaphoretic.   Psychiatric: He has a normal mood and affect. His behavior is normal.   Vitals reviewed.      Assessment:       1. IT band syndrome, right    2. Piriformis syndrome, right        Plan:       *IT band syndrome, right  Discussed supportive home care such as massage, ice/heat,  gentle stretching. Discussed need for strengthening after recovery for prevention. Follow up if pain not resolved over next 1-2 weeks  -     X-Ray Hip 2 or 3 views Right; Future; Expected date: 06/16/2017  -     Ambulatory consult to Physical Therapy    Piriformis syndrome, right  -     Ambulatory consult to Physical Therapy    **

## 2017-06-19 ENCOUNTER — TELEPHONE (OUTPATIENT)
Dept: INTERNAL MEDICINE | Facility: CLINIC | Age: 62
End: 2017-06-19

## 2017-06-19 NOTE — TELEPHONE ENCOUNTER
Unable to reach someone who can assists with extending transportation privileges. I have called and been transferred several times in attempt to speak with correct department. I left message on Katharine voicemail, explaining and asking her to return call to discuss.

## 2017-07-07 ENCOUNTER — DOCUMENTATION ONLY (OUTPATIENT)
Dept: PULMONOLOGY | Facility: CLINIC | Age: 62
End: 2017-07-07

## 2017-07-07 ENCOUNTER — HOSPITAL ENCOUNTER (OUTPATIENT)
Dept: RADIOLOGY | Facility: HOSPITAL | Age: 62
Discharge: HOME | End: 2017-07-07
Attending: INTERNAL MEDICINE
Payer: MEDICARE

## 2017-07-07 DIAGNOSIS — I48.0 PAF (PAROXYSMAL ATRIAL FIBRILLATION): ICD-10-CM

## 2017-07-07 PROCEDURE — 71020 XR CHEST PA AND LATERAL: CPT | Mod: TC,PO

## 2017-07-07 PROCEDURE — 71020 XR CHEST PA AND LATERAL: CPT | Mod: 26,,, | Performed by: RADIOLOGY

## 2017-07-07 NOTE — PROGRESS NOTES
Compliance Summary  2/21/2017 - 4/21/2017 (60 days)  Days with Device Usage 12 days  Days without Device Usage 48 days  Percent Days with Device Usage 20.0%  Cumulative Usage 3 hrs. 22 mins. 41 secs.  Maximum Usage (1 Day) 42 mins. 41 secs.  Average Usage (All Days) 3 mins. 22 secs.  Average Usage (Days Used) 16 mins. 53 secs.  Minimum Usage (1 Day) 28 secs.  Percent of Days with Usage >= 4 Hours 0.0%  Percent of Days with Usage < 4 Hours 100.0%  Date Range  Total Blower Time 1 day 13 hrs. 10 mins. 25 secs.  Sleep Therapy Statistics  Average Time in Large Leak Per Day 1 mins. 50 secs.  Average AHI 3.6  CPAP 14.0 cmH2O    Patient remains not compliant with CPAP use despite refitting of mask with [facility].

## 2017-07-11 NOTE — PROGRESS NOTES
Update on patient's progress with his CPAP use.  With recent download he had not turned on his CPAP machine since April 2017.   Per message received from Vianney with [facility] on 7/10/2017: The download I sent you was from the last day he turned on his machine.  He is not using it at all.     Compliance Summary  2/21/2017 - 4/21/2017 (60 days)  Days with Device Usage 12 days  Days without Device Usage 48 days  Percent Days with Device Usage 20.0%  Cumulative Usage 3 hrs. 22 mins. 41 secs.  Maximum Usage (1 Day) 42 mins. 41 secs.  Average Usage (All Days) 3 mins. 22 secs.  Average Usage (Days Used) 16 mins. 53 secs.  Minimum Usage (1 Day) 28 secs.  Percent of Days with Usage >= 4 Hours 0.0%  Percent of Days with Usage < 4 Hours 100.0%  Date Range  Total Blower Time 1 day 13 hrs. 10 mins. 25 secs.  Sleep Therapy Statistics  Average Time in Large Leak Per Day 1 mins. 50 secs.  Average AHI 3.6  CPAP 14.0 cmH2O

## 2017-07-12 ENCOUNTER — HOSPITAL ENCOUNTER (OUTPATIENT)
Facility: HOSPITAL | Age: 62
Discharge: HOME | End: 2017-07-13
Attending: EMERGENCY MEDICINE | Admitting: INTERNAL MEDICINE
Payer: MEDICARE

## 2017-07-12 ENCOUNTER — OFFICE VISIT (OUTPATIENT)
Dept: URGENT CARE | Facility: CLINIC | Age: 62
End: 2017-07-12
Payer: MEDICARE

## 2017-07-12 ENCOUNTER — PROCEDURE VISIT (OUTPATIENT)
Dept: PULMONOLOGY | Facility: CLINIC | Age: 62
End: 2017-07-12
Payer: MEDICARE

## 2017-07-12 VITALS
SYSTOLIC BLOOD PRESSURE: 96 MMHG | WEIGHT: 199.31 LBS | HEIGHT: 66 IN | OXYGEN SATURATION: 98 % | TEMPERATURE: 98 F | BODY MASS INDEX: 32.03 KG/M2 | HEART RATE: 90 BPM | DIASTOLIC BLOOD PRESSURE: 70 MMHG

## 2017-07-12 DIAGNOSIS — R07.89 ATYPICAL CHEST PAIN: Primary | ICD-10-CM

## 2017-07-12 DIAGNOSIS — J45.909 MODERATE ASTHMA: ICD-10-CM

## 2017-07-12 DIAGNOSIS — R07.9 CHEST PAIN, UNSPECIFIED TYPE: Primary | ICD-10-CM

## 2017-07-12 DIAGNOSIS — R07.9 CHEST PAIN: ICD-10-CM

## 2017-07-12 DIAGNOSIS — I25.10 CORONARY ARTERY DISEASE, ANGINA PRESENCE UNSPECIFIED, UNSPECIFIED VESSEL OR LESION TYPE, UNSPECIFIED WHETHER NATIVE OR TRANSPLANTED HEART: ICD-10-CM

## 2017-07-12 DIAGNOSIS — R51.9 HEADACHE, UNSPECIFIED HEADACHE TYPE: ICD-10-CM

## 2017-07-12 DIAGNOSIS — R42 DIZZINESS: ICD-10-CM

## 2017-07-12 DIAGNOSIS — Z86.39 HISTORY OF DIABETES MELLITUS, TYPE II: ICD-10-CM

## 2017-07-12 PROBLEM — M25.551 RIGHT HIP PAIN: Status: ACTIVE | Noted: 2017-07-12

## 2017-07-12 PROBLEM — R79.89 ELEVATED TROPONIN: Status: ACTIVE | Noted: 2017-07-12

## 2017-07-12 LAB
ALBUMIN SERPL BCP-MCNC: 3.3 G/DL
ALP SERPL-CCNC: 73 U/L
ALT SERPL W/O P-5'-P-CCNC: 36 U/L
ANION GAP SERPL CALC-SCNC: 13 MMOL/L
AST SERPL-CCNC: 29 U/L
BASOPHILS # BLD AUTO: 0.01 K/UL
BASOPHILS NFR BLD: 0.1 %
BILIRUB SERPL-MCNC: 0.4 MG/DL
BNP SERPL-MCNC: 40 PG/ML
BUN SERPL-MCNC: 10 MG/DL
CALCIUM SERPL-MCNC: 9.6 MG/DL
CHLORIDE SERPL-SCNC: 103 MMOL/L
CK MB SERPL-MCNC: 3.3 NG/ML
CK MB SERPL-RTO: 2 %
CK SERPL-CCNC: 166 U/L
CO2 SERPL-SCNC: 22 MMOL/L
CREAT SERPL-MCNC: 1.1 MG/DL
DIASTOLIC DYSFUNCTION: YES
DIFFERENTIAL METHOD: NORMAL
EOSINOPHIL # BLD AUTO: 0.1 K/UL
EOSINOPHIL NFR BLD: 0.8 %
ERYTHROCYTE [DISTWIDTH] IN BLOOD BY AUTOMATED COUNT: 14.1 %
EST. GFR  (AFRICAN AMERICAN): >60 ML/MIN/1.73 M^2
EST. GFR  (NON AFRICAN AMERICAN): >60 ML/MIN/1.73 M^2
GLUCOSE SERPL-MCNC: 104 MG/DL
GLUCOSE SERPL-MCNC: 119 MG/DL (ref 70–110)
HCT VFR BLD AUTO: 43.2 %
HGB BLD-MCNC: 14.8 G/DL
LYMPHOCYTES # BLD AUTO: 3.5 K/UL
LYMPHOCYTES NFR BLD: 29 %
MCH RBC QN AUTO: 28.9 PG
MCHC RBC AUTO-ENTMCNC: 34.3 %
MCV RBC AUTO: 84 FL
MONOCYTES # BLD AUTO: 0.7 K/UL
MONOCYTES NFR BLD: 5.8 %
NEUTROPHILS # BLD AUTO: 7.7 K/UL
NEUTROPHILS NFR BLD: 64.6 %
PLATELET # BLD AUTO: 201 K/UL
PMV BLD AUTO: 9.8 FL
POCT GLUCOSE: 104 MG/DL (ref 70–110)
POCT GLUCOSE: 82 MG/DL (ref 70–110)
POTASSIUM SERPL-SCNC: 4.2 MMOL/L
PROT SERPL-MCNC: 7.9 G/DL
RBC # BLD AUTO: 5.12 M/UL
RETIRED EF AND QEF - SEE NOTES: 45 (ref 55–65)
SODIUM SERPL-SCNC: 138 MMOL/L
TROPONIN I SERPL DL<=0.01 NG/ML-MCNC: 0.09 NG/ML
TROPONIN I SERPL DL<=0.01 NG/ML-MCNC: 0.11 NG/ML
WBC # BLD AUTO: 11.98 K/UL

## 2017-07-12 PROCEDURE — 82962 GLUCOSE BLOOD TEST: CPT

## 2017-07-12 PROCEDURE — 93010 ELECTROCARDIOGRAM REPORT: CPT | Mod: 77,,, | Performed by: INTERNAL MEDICINE

## 2017-07-12 PROCEDURE — 27000221 HC OXYGEN, UP TO 24 HOURS

## 2017-07-12 PROCEDURE — 85025 COMPLETE CBC W/AUTO DIFF WBC: CPT

## 2017-07-12 PROCEDURE — 99285 EMERGENCY DEPT VISIT HI MDM: CPT | Mod: 25

## 2017-07-12 PROCEDURE — 94640 AIRWAY INHALATION TREATMENT: CPT

## 2017-07-12 PROCEDURE — 82948 REAGENT STRIP/BLOOD GLUCOSE: CPT | Mod: S$GLB,,, | Performed by: NURSE PRACTITIONER

## 2017-07-12 PROCEDURE — 96375 TX/PRO/DX INJ NEW DRUG ADDON: CPT | Mod: 76

## 2017-07-12 PROCEDURE — 84484 ASSAY OF TROPONIN QUANT: CPT

## 2017-07-12 PROCEDURE — 93005 ELECTROCARDIOGRAM TRACING: CPT

## 2017-07-12 PROCEDURE — 63600175 PHARM REV CODE 636 W HCPCS: Performed by: EMERGENCY MEDICINE

## 2017-07-12 PROCEDURE — 83880 ASSAY OF NATRIURETIC PEPTIDE: CPT

## 2017-07-12 PROCEDURE — 25000003 PHARM REV CODE 250: Performed by: NURSE PRACTITIONER

## 2017-07-12 PROCEDURE — 25000003 PHARM REV CODE 250: Performed by: EMERGENCY MEDICINE

## 2017-07-12 PROCEDURE — 96361 HYDRATE IV INFUSION ADD-ON: CPT

## 2017-07-12 PROCEDURE — 63600175 PHARM REV CODE 636 W HCPCS: Performed by: NURSE PRACTITIONER

## 2017-07-12 PROCEDURE — 96374 THER/PROPH/DIAG INJ IV PUSH: CPT | Mod: 76

## 2017-07-12 PROCEDURE — 93306 TTE W/DOPPLER COMPLETE: CPT

## 2017-07-12 PROCEDURE — 93010 ELECTROCARDIOGRAM REPORT: CPT | Mod: ,,, | Performed by: NUCLEAR MEDICINE

## 2017-07-12 PROCEDURE — 80053 COMPREHEN METABOLIC PANEL: CPT

## 2017-07-12 PROCEDURE — 84484 ASSAY OF TROPONIN QUANT: CPT | Mod: 91

## 2017-07-12 PROCEDURE — 99219 PR INITIAL OBSERVATION CARE,LEVL II: CPT | Mod: ,,, | Performed by: INTERNAL MEDICINE

## 2017-07-12 PROCEDURE — G0378 HOSPITAL OBSERVATION PER HR: HCPCS

## 2017-07-12 PROCEDURE — 93306 TTE W/DOPPLER COMPLETE: CPT | Mod: 26,,, | Performed by: INTERNAL MEDICINE

## 2017-07-12 PROCEDURE — 82553 CREATINE MB FRACTION: CPT

## 2017-07-12 PROCEDURE — 36415 COLL VENOUS BLD VENIPUNCTURE: CPT

## 2017-07-12 PROCEDURE — 99214 OFFICE O/P EST MOD 30 MIN: CPT | Mod: S$GLB,,, | Performed by: NURSE PRACTITIONER

## 2017-07-12 PROCEDURE — 99999 PR PBB SHADOW E&M-EST. PATIENT-LVL V: CPT | Mod: PBBFAC,,, | Performed by: NURSE PRACTITIONER

## 2017-07-12 RX ORDER — GABAPENTIN 300 MG/1
300 CAPSULE ORAL 3 TIMES DAILY
Status: DISCONTINUED | OUTPATIENT
Start: 2017-07-12 | End: 2017-07-13 | Stop reason: HOSPADM

## 2017-07-12 RX ORDER — METOPROLOL SUCCINATE 50 MG/1
50 TABLET, EXTENDED RELEASE ORAL DAILY
Status: DISCONTINUED | OUTPATIENT
Start: 2017-07-13 | End: 2017-07-13 | Stop reason: HOSPADM

## 2017-07-12 RX ORDER — AMIODARONE HYDROCHLORIDE 200 MG/1
200 TABLET ORAL DAILY
Status: DISCONTINUED | OUTPATIENT
Start: 2017-07-13 | End: 2017-07-13 | Stop reason: HOSPADM

## 2017-07-12 RX ORDER — IBUPROFEN 200 MG
24 TABLET ORAL
Status: DISCONTINUED | OUTPATIENT
Start: 2017-07-12 | End: 2017-07-13 | Stop reason: HOSPADM

## 2017-07-12 RX ORDER — HEPARIN SODIUM,PORCINE/D5W 25000/250
17 INTRAVENOUS SOLUTION INTRAVENOUS CONTINUOUS
Status: DISCONTINUED | OUTPATIENT
Start: 2017-07-12 | End: 2017-07-12

## 2017-07-12 RX ORDER — NAPROXEN SODIUM 220 MG/1
243 TABLET, FILM COATED ORAL DAILY
Status: DISCONTINUED | OUTPATIENT
Start: 2017-07-12 | End: 2017-07-12

## 2017-07-12 RX ORDER — MORPHINE SULFATE 2 MG/ML
2 INJECTION, SOLUTION INTRAMUSCULAR; INTRAVENOUS EVERY 4 HOURS PRN
Status: DISCONTINUED | OUTPATIENT
Start: 2017-07-12 | End: 2017-07-13 | Stop reason: HOSPADM

## 2017-07-12 RX ORDER — PANTOPRAZOLE SODIUM 40 MG/1
40 TABLET, DELAYED RELEASE ORAL DAILY
Status: DISCONTINUED | OUTPATIENT
Start: 2017-07-13 | End: 2017-07-13 | Stop reason: HOSPADM

## 2017-07-12 RX ORDER — BUDESONIDE 0.5 MG/2ML
0.5 INHALANT ORAL EVERY 12 HOURS
Status: DISCONTINUED | OUTPATIENT
Start: 2017-07-12 | End: 2017-07-13 | Stop reason: HOSPADM

## 2017-07-12 RX ORDER — MORPHINE SULFATE 4 MG/ML
4 INJECTION, SOLUTION INTRAMUSCULAR; INTRAVENOUS
Status: COMPLETED | OUTPATIENT
Start: 2017-07-12 | End: 2017-07-12

## 2017-07-12 RX ORDER — IBUPROFEN 200 MG
16 TABLET ORAL
Status: DISCONTINUED | OUTPATIENT
Start: 2017-07-12 | End: 2017-07-13 | Stop reason: HOSPADM

## 2017-07-12 RX ORDER — LISINOPRIL 5 MG/1
5 TABLET ORAL DAILY
Status: DISCONTINUED | OUTPATIENT
Start: 2017-07-13 | End: 2017-07-13 | Stop reason: HOSPADM

## 2017-07-12 RX ORDER — ASPIRIN 81 MG/1
81 TABLET ORAL DAILY
Status: DISCONTINUED | OUTPATIENT
Start: 2017-07-13 | End: 2017-07-13 | Stop reason: HOSPADM

## 2017-07-12 RX ORDER — ASPIRIN 325 MG
325 TABLET ORAL
Status: DISCONTINUED | OUTPATIENT
Start: 2017-07-12 | End: 2017-07-12 | Stop reason: HOSPADM

## 2017-07-12 RX ORDER — GLUCAGON 1 MG
1 KIT INJECTION
Status: DISCONTINUED | OUTPATIENT
Start: 2017-07-12 | End: 2017-07-13 | Stop reason: HOSPADM

## 2017-07-12 RX ORDER — ONDANSETRON 2 MG/ML
4 INJECTION INTRAMUSCULAR; INTRAVENOUS
Status: COMPLETED | OUTPATIENT
Start: 2017-07-12 | End: 2017-07-12

## 2017-07-12 RX ORDER — NITROGLYCERIN 0.4 MG/1
0.4 TABLET SUBLINGUAL EVERY 5 MIN PRN
Status: DISCONTINUED | OUTPATIENT
Start: 2017-07-12 | End: 2017-07-13 | Stop reason: HOSPADM

## 2017-07-12 RX ORDER — IPRATROPIUM BROMIDE AND ALBUTEROL SULFATE 2.5; .5 MG/3ML; MG/3ML
3 SOLUTION RESPIRATORY (INHALATION) EVERY 6 HOURS PRN
Status: DISCONTINUED | OUTPATIENT
Start: 2017-07-12 | End: 2017-07-13 | Stop reason: HOSPADM

## 2017-07-12 RX ORDER — ATORVASTATIN CALCIUM 40 MG/1
40 TABLET, FILM COATED ORAL NIGHTLY
Status: DISCONTINUED | OUTPATIENT
Start: 2017-07-12 | End: 2017-07-13 | Stop reason: HOSPADM

## 2017-07-12 RX ORDER — ARFORMOTEROL TARTRATE 15 UG/2ML
15 SOLUTION RESPIRATORY (INHALATION) 2 TIMES DAILY
Status: DISCONTINUED | OUTPATIENT
Start: 2017-07-12 | End: 2017-07-13 | Stop reason: HOSPADM

## 2017-07-12 RX ORDER — IPRATROPIUM BROMIDE 0.5 MG/2.5ML
0.5 SOLUTION RESPIRATORY (INHALATION) DAILY
Status: DISCONTINUED | OUTPATIENT
Start: 2017-07-13 | End: 2017-07-13 | Stop reason: HOSPADM

## 2017-07-12 RX ORDER — SERTRALINE HYDROCHLORIDE 50 MG/1
50 TABLET, FILM COATED ORAL DAILY
Status: DISCONTINUED | OUTPATIENT
Start: 2017-07-13 | End: 2017-07-13 | Stop reason: HOSPADM

## 2017-07-12 RX ORDER — INSULIN ASPART 100 [IU]/ML
0-5 INJECTION, SOLUTION INTRAVENOUS; SUBCUTANEOUS
Status: DISCONTINUED | OUTPATIENT
Start: 2017-07-12 | End: 2017-07-13 | Stop reason: HOSPADM

## 2017-07-12 RX ADMIN — ARFORMOTEROL TARTRATE 15 MCG: 15 SOLUTION RESPIRATORY (INHALATION) at 07:07

## 2017-07-12 RX ADMIN — ATORVASTATIN CALCIUM 40 MG: 40 TABLET, FILM COATED ORAL at 10:07

## 2017-07-12 RX ADMIN — MORPHINE SULFATE 4 MG: 4 INJECTION, SOLUTION INTRAMUSCULAR; INTRAVENOUS at 12:07

## 2017-07-12 RX ADMIN — SODIUM CHLORIDE 500 ML: 0.9 INJECTION, SOLUTION INTRAVENOUS at 11:07

## 2017-07-12 RX ADMIN — GABAPENTIN 300 MG: 300 CAPSULE ORAL at 10:07

## 2017-07-12 RX ADMIN — ONDANSETRON 4 MG: 2 INJECTION INTRAMUSCULAR; INTRAVENOUS at 12:07

## 2017-07-12 RX ADMIN — BUDESONIDE 0.5 MG: 0.5 SUSPENSION RESPIRATORY (INHALATION) at 07:07

## 2017-07-12 RX ADMIN — NITROGLYCERIN 1 INCH: 20 OINTMENT TOPICAL at 06:07

## 2017-07-12 RX ADMIN — MORPHINE SULFATE 2 MG: 2 INJECTION, SOLUTION INTRAMUSCULAR; INTRAVENOUS at 09:07

## 2017-07-12 RX ADMIN — Medication 325 MG: at 10:07

## 2017-07-12 RX ADMIN — APIXABAN 5 MG: 2.5 TABLET, FILM COATED ORAL at 10:07

## 2017-07-12 NOTE — PLAN OF CARE
CAMACHO form signed.       07/12/17 1501   CAMACHO Message   Medicare Outpatient and Observation Notification regarding financial responsibility Given to patient/caregiver;Explained to patient/caregiver;Signed/date by patient/caregiver   Date CAMACHO was signed 07/12/17   Time CAMACHO was signed [id number]

## 2017-07-12 NOTE — ED PROVIDER NOTES
SCRIBE #1 NOTE: I, Tiff Velarde, am scribing for, and in the presence of, Ewelina Kay MD. I have scribed the entire note.      History      Chief Complaint   Patient presents with    Chest Pain     and dizziness       Review of patient's allergies indicates:  No Known Allergies     HPI   HPI    7/12/2017, 11:10 AM   History obtained from the patient      History of Present Illness: Crow Green is a 62 y.o. male patient who presents to the Emergency Department for left sided CP which onset suddenly today. Symptoms are constant and moderate in severity. Pt describes pain as sharp and rates pain an 8/10. Pt completed a pulmonary function test at [facility] when pain onset. No mitigating or exacerbating factors reported. Associated sxs include dizziness, SOB, diaphoresis, and nausea. Patient denies any fever, emesis, diarrhea, abd pain, cough, leg swelling, palpitations, HA, lightheadedness, numbness, weakness, flank pain, dysuria, hematuria, and all other sxs at this time. Prior Tx includes ASA and nitroglycerin by EMS en route with no relief. No further complaints or concerns at this time.       Arrival mode: EMS    PCP: Mateo Lambert DO       Past Medical History:  Past Medical History:   Diagnosis Date    Arthritis     Asthma     Depression     Diabetes mellitus     Diabetes mellitus, type 2 2000    Elevated PSA     Hypertension        Past Surgical History:  History reviewed. No pertinent surgical history.      Family History:  Family History   Problem Relation Age of Onset    Glaucoma Mother     Cataracts Mother     Cataracts Father     Glaucoma Sister     Cataracts Sister     Glaucoma Maternal Aunt     Prostate cancer Neg Hx        Social History:  Social History     Social History Main Topics    Smoking status: Never Smoker    Smokeless tobacco: Never Used    Alcohol use No    Drug use: No    Sexual activity: Not Currently       ROS   Review of Systems   Constitutional:  Positive for diaphoresis. Negative for fever.   HENT: Negative for sore throat.    Respiratory: Positive for shortness of breath. Negative for cough.    Cardiovascular: Positive for chest pain (left sided). Negative for palpitations and leg swelling.   Gastrointestinal: Positive for nausea. Negative for abdominal pain, blood in stool, constipation, diarrhea and vomiting.   Genitourinary: Negative for decreased urine volume, difficulty urinating, dysuria, flank pain and hematuria.   Musculoskeletal: Negative for back pain.   Skin: Negative for rash.   Neurological: Positive for dizziness. Negative for weakness, light-headedness, numbness and headaches.   Hematological: Does not bruise/bleed easily.     Physical Exam      Initial Vitals [07/12/17 1106]   BP Pulse Resp Temp SpO2   111/72 89 18 98.4 °F (36.9 °C) 100 %      MAP       85          Physical Exam  Nursing Notes and Vital Signs Reviewed.  Constitutional: Patient is in no acute distress. Well-developed and well-nourished.  Head: Atraumatic. Normocephalic.  Eyes: PERRL. EOM intact. Conjunctivae are not pale. No scleral icterus.  ENT: Mucous membranes are moist. Oropharynx is clear and symmetric.    Neck: Supple. Full ROM. No lymphadenopathy.  Cardiovascular: Regular rate. Regular rhythm. No murmurs, rubs, or gallops. Distal pulses are 2+ and symmetric.  Pulmonary/Chest: No respiratory distress. Clear to auscultation bilaterally. No wheezing, rales, or rhonchi.  Abdominal: Soft and non-distended.  There is no tenderness.  No rebound, guarding, or rigidity. Good bowel sounds.  Musculoskeletal: Moves all extremities. No obvious deformities. No edema. No calf tenderness.  Skin: Warm and dry.  Neurological:  Alert, awake, and appropriate.  Normal speech.  No acute focal neurological deficits are appreciated.  Psychiatric: Normal affect. Good eye contact. Appropriate in content.    ED Course    Procedures  ED Vital Signs:  Vitals:    07/12/17 1106 07/12/17 1137  07/12/17 1145 07/12/17 1204   BP: 111/72 91/62  108/71   Pulse: 89 84 80 80   Resp: 18 19 19   Temp: 98.4 °F (36.9 °C)      TempSrc: Oral      SpO2: 100% 95%  96%   Weight:        07/12/17 1206 07/12/17 1218 07/12/17 1249 07/12/17 1418   BP:  105/72 110/72 110/72   Pulse:  78 74 73   Resp:  19 19 18   Temp:       TempSrc:       SpO2:  95% 96% 97%   Weight: 90.4 kg (199 lb 4.7 oz)       07/12/17 1448   BP: 119/74   Pulse: 73   Resp: 19   Temp:    TempSrc:    SpO2: 96%   Weight:        Abnormal Lab Results:  Labs Reviewed   COMPREHENSIVE METABOLIC PANEL - Abnormal; Notable for the following:        Result Value    CO2 22 (*)     Albumin 3.3 (*)     All other components within normal limits   TROPONIN I - Abnormal; Notable for the following:     Troponin I 0.113 (*)     All other components within normal limits   CBC W/ AUTO DIFFERENTIAL   B-TYPE NATRIURETIC PEPTIDE   CK-MB   POCT GLUCOSE   POCT GLUCOSE MONITORING CONTINUOUS        All Lab Results:  Results for orders placed or performed during the hospital encounter of 07/12/17   CBC auto differential   Result Value Ref Range    WBC 11.98 3.90 - 12.70 K/uL    RBC 5.12 4.60 - 6.20 M/uL    Hemoglobin 14.8 14.0 - 18.0 g/dL    Hematocrit 43.2 40.0 - 54.0 %    MCV 84 82 - 98 fL    MCH 28.9 27.0 - 31.0 pg    MCHC 34.3 32.0 - 36.0 %    RDW 14.1 11.5 - 14.5 %    Platelets 201 150 - 350 K/uL    MPV 9.8 9.2 - 12.9 fL    Gran # 7.7 1.8 - 7.7 K/uL    Lymph # 3.5 1.0 - 4.8 K/uL    Mono # 0.7 0.3 - 1.0 K/uL    Eos # 0.1 0.0 - 0.5 K/uL    Baso # 0.01 0.00 - 0.20 K/uL    Gran% 64.6 38.0 - 73.0 %    Lymph% 29.0 18.0 - 48.0 %    Mono% 5.8 4.0 - 15.0 %    Eosinophil% 0.8 0.0 - 8.0 %    Basophil% 0.1 0.0 - 1.9 %    Differential Method Automated    Comprehensive metabolic panel   Result Value Ref Range    Sodium 138 136 - 145 mmol/L    Potassium 4.2 3.5 - 5.1 mmol/L    Chloride 103 95 - 110 mmol/L    CO2 22 (L) 23 - 29 mmol/L    Glucose 104 70 - 110 mg/dL    BUN, Bld 10 6 - 20 mg/dL     Creatinine 1.1 0.5 - 1.4 mg/dL    Calcium 9.6 8.7 - 10.5 mg/dL    Total Protein 7.9 6.0 - 8.4 g/dL    Albumin 3.3 (L) 3.5 - 5.2 g/dL    Total Bilirubin 0.4 0.1 - 1.0 mg/dL    Alkaline Phosphatase 73 55 - 135 U/L    AST 29 10 - 40 U/L    ALT 36 10 - 44 U/L    Anion Gap 13 8 - 16 mmol/L    eGFR if African American >60 >60 mL/min/1.73 m^2    eGFR if non African American >60 >60 mL/min/1.73 m^2   Troponin I #1   Result Value Ref Range    Troponin I 0.113 (H) 0.000 - 0.026 ng/mL   B-Type natriuretic peptide (BNP)   Result Value Ref Range    BNP 40 0 - 99 pg/mL   CK-MB   Result Value Ref Range     20 - 200 U/L    CPK MB 3.3 0.1 - 6.5 ng/mL    MB% 2.0 0.0 - 5.0 %   2D echo with color flow doppler   Result Value Ref Range    EF 45 55 - 65    Diastolic Dysfunction Yes (A)    POCT glucose   Result Value Ref Range    POCT Glucose 82 70 - 110 mg/dL       Imaging Results:  Imaging Results          X-Ray Chest AP Portable (Final result)  Result time 07/12/17 13:05:09    Final result by Devon March MD (07/12/17 13:05:09)                 Impression:       No acute process seen.      Electronically signed by: DEVON MARCH MD  Date:     07/12/17  Time:    13:05              Narrative:    Clinical Data:Chest pain 7    Comparison:  7/12/17    Findings:  Single view of the chest.      Cardiac silhouette is normal.  The lungs demonstrate no evidence of active disease.  No evidence of pleural effusion or pneumothorax.  Bones appear intact.                             The EKG was ordered, reviewed, and independently interpreted by the ED provider.  Interpretation time: 11:10  Rate: 87 BPM  Rhythm: normal sinus rhythm  Interpretation: Incomplete right bundle branch block. Left anterior fascicular block. ST &T wave abnormality. No STEMI.  When compared to EKG performed 4/28/17, there are no significant changes.         The Emergency Provider reviewed the vital signs and test results, which are outlined above.    ED Discussion      12:39 PM: Dr. Kay discussed the pt's case with Maxwell Xie NP (Cardiologist) who recommends admission to hospital for rule out. He will consult with Dr. Pena and will notify if there are any changes.    12:47 PM: Re-evaluated pt. I have discussed test results, shared treatment plan, and the need for admission with patient and family at bedside. Pt and family express understanding at this time and agree with all information. All questions answered. Pt and family have no further questions or concerns at this time. Pt is ready for admit.    1:05 PM: Discussed case with Annabelle Anguiano NP ([facility]). Annabelle agrees with current care and management of pt and accepts admission.   Admitting Service: Hospital medicine   Admitting Physician: Dr. Chavez   Admit to: Observation      ED Medication(s):  Medications   amiodarone tablet 200 mg (not administered)   apixaban tablet 5 mg (not administered)   metoprolol succinate (TOPROL-XL) 24 hr tablet 50 mg (not administered)   lisinopril tablet 5 mg (not administered)   atorvastatin tablet 40 mg (not administered)   gabapentin capsule 300 mg (not administered)   pantoprazole EC tablet 40 mg (not administered)   sertraline tablet 50 mg (not administered)   aspirin EC tablet 81 mg (not administered)   nitroGLYCERIN SL tablet 0.4 mg (not administered)   glucose chewable tablet 16 g (not administered)   glucose chewable tablet 24 g (not administered)   dextrose 50% injection 12.5 g (not administered)   dextrose 50% injection 25 g (not administered)   glucagon (human recombinant) injection 1 mg (not administered)   insulin aspart pen 0-5 Units (not administered)   ipratropium 0.02 % nebulizer solution 0.5 mg (not administered)   arformoterol nebulizer solution 15 mcg (not administered)   budesonide nebulizer solution 0.5 mg (not administered)   albuterol-ipratropium 2.5mg-0.5mg/3mL nebulizer solution 3 mL (not administered)   nitroGLYCERIN 2% TD oint ointment 1 inch (not  administered)   morphine injection 2 mg (not administered)   sodium chloride 0.9% bolus 500 mL (0 mLs Intravenous Stopped 7/12/17 1251)   morphine injection 4 mg (4 mg Intravenous Given 7/12/17 1252)   ondansetron injection 4 mg (4 mg Intravenous Given 7/12/17 1252)       New Prescriptions    No medications on file             Medical Decision Making    Medical Decision Making:   Clinical Tests:   Lab Tests: Ordered and Reviewed  Radiological Study: Ordered and Reviewed  Medical Tests: Ordered and Reviewed           Scribe Attestation:   Scribe #1: I performed the above scribed service and the documentation accurately describes the services I performed. I attest to the accuracy of the note.    Attending:   Physician Attestation Statement for Scribe #1: I, Ewelina Kay MD, personally performed the services described in this documentation, as scribed by Tiff Velarde, in my presence, and it is both accurate and complete.          Clinical Impression       ICD-10-CM ICD-9-CM   1. Atypical chest pain R07.89 786.59   2. Chest pain R07.9 786.50   3. History of diabetes mellitus, type II Z86.39 V12.29   4. Coronary artery disease, angina presence unspecified, unspecified vessel or lesion type, unspecified whether native or transplanted heart I25.10 414.00       Disposition:   Disposition: Placed in Observation  Condition: [place] Kay MD  07/12/17 3686

## 2017-07-12 NOTE — CONSULTS
Consult Note  Cardiology    Consult Requested By: Dr Kay  Reason for Consult: Rule out ACS    SUBJECTIVE:     History of Present Illness:  Mr Green is a 62 y.o. male presents with with PMHx of DM, HTN, Depression, PAF on Eliquis, SANDRITA and Diastolic CHF. He presented to [facility] Emergency Room with left side chest pain, described as sharp. Patient was waiting this AM to have PFT's preformed and started feeling weak, nausea and dizzy. He was sent to Urgent Care and for there the ER for further evaluation. Patient underwent Left Heart Cath 11/18/2016 for ACS that showed Nonobstructive CAD, nonischemic CM. Troponin 0.113, BNP 40. EKG today: Normal Sinus Rhythm, ST and T wave abnormality. 2 D Echo today showed mildly depressed LVEF or 45 % and diastolic dysfunction.     Review of patient's allergies indicates:  No Known Allergies    Past Medical History:   Diagnosis Date    Arthritis     Asthma     Depression     Diabetes mellitus     Diabetes mellitus, type 2 2000    Elevated PSA     Hypertension      History reviewed. No pertinent surgical history.  Family History   Problem Relation Age of Onset    Glaucoma Mother     Cataracts Mother     Cataracts Father     Glaucoma Sister     Cataracts Sister     Glaucoma Maternal Aunt     Prostate cancer Neg Hx      Social History   Substance Use Topics    Smoking status: Never Smoker    Smokeless tobacco: Never Used    Alcohol use No        Review of Systems:  Constitutional: positive for weakness, negative for fever or chills   Eyes: negative  Ears, nose, mouth, throat, and face: negative  Respiratory: negative for shortness of breath, negative orthopnea or PND  Cardiovascular: positive for chest pain, negative palpitations, syncope or near syncope  Gastrointestinal: positive of nausea, negative for vomiting  Genitourinary:negative  Hematologic/lymphatic: negative  Musculoskeletal: positive left hip pain    Neurological: negative  Behavioral/Psych:  negative  Endocrine: negative  Allergic/Immunologic: negative    OBJECTIVE:     Vital Signs (Most Recent)  Temp: 98.4 °F (36.9 °C) (07/12/17 1106)  Pulse: 73 (07/12/17 1448)  Resp: 19 (07/12/17 1448)  BP: 119/74 (07/12/17 1448)  SpO2: 96 % (07/12/17 1448)    Vital Signs Range (Last 24H):  Temp:  [97.6 °F (36.4 °C)-98.4 °F (36.9 °C)]   Pulse:  [73-90]   Resp:  [18-19]   BP: ()/(62-74)   SpO2:  [95 %-100 %]     Current Facility-Administered Medications   Medication    albuterol-ipratropium 2.5mg-0.5mg/3mL nebulizer solution 3 mL    [START ON 7/13/2017] amiodarone tablet 200 mg    apixaban tablet 5 mg    arformoterol nebulizer solution 15 mcg    [START ON 7/13/2017] aspirin EC tablet 81 mg    atorvastatin tablet 40 mg    budesonide nebulizer solution 0.5 mg    dextrose 50% injection 12.5 g    dextrose 50% injection 25 g    gabapentin capsule 300 mg    glucagon (human recombinant) injection 1 mg    glucose chewable tablet 16 g    glucose chewable tablet 24 g    insulin aspart pen 0-5 Units    [START ON 7/13/2017] ipratropium 0.02 % nebulizer solution 0.5 mg    [START ON 7/13/2017] lisinopril tablet 5 mg    [START ON 7/13/2017] metoprolol succinate (TOPROL-XL) 24 hr tablet 50 mg    morphine injection 2 mg    nitroGLYCERIN 2% TD oint ointment 1 inch    nitroGLYCERIN SL tablet 0.4 mg    [START ON 7/13/2017] pantoprazole EC tablet 40 mg    [START ON 7/13/2017] sertraline tablet 50 mg     Current Outpatient Prescriptions   Medication Sig    amiodarone (PACERONE) 200 MG Tab Take 1 tablet (200 mg total) by mouth once daily.    amlodipine (NORVASC) 10 MG tablet Take 1 tablet (10 mg total) by mouth once daily.    apixaban 5 mg Tab Take 1 tablet (5 mg total) by mouth 2 (two) times daily.    aspirin (ECOTRIN) 81 MG EC tablet Take 1 tablet (81 mg total) by mouth once daily.    atorvastatin (LIPITOR) 40 MG tablet Take 1 tablet (40 mg total) by mouth every evening.    fluticasone-salmeterol 100-50  "mcg/dose (ADVAIR DISKUS) 100-50 mcg/dose diskus inhaler Inhale 1 puff into the lungs 2 (two) times daily. Controller    gabapentin (NEURONTIN) 300 MG capsule Take 1 capsule (300 mg total) by mouth 3 (three) times daily.    insulin glargine (LANTUS SOLOSTAR) 100 unit/mL (3 mL) InPn pen Inject 50 Units into the skin every evening.    latanoprost 0.005 % ophthalmic solution Place 1 drop into both eyes every evening.    lisinopril (PRINIVIL,ZESTRIL) 5 MG tablet Take 1 tablet (5 mg total) by mouth once daily.    metformin (GLUCOPHAGE) 1000 MG tablet Take 1 tablet (1,000 mg total) by mouth 2 (two) times daily with meals.    metoprolol succinate (TOPROL-XL) 50 MG 24 hr tablet TAKE 1 TABLET EVERY DAY    NOVOLOG FLEXPEN 100 unit/mL InPn pen ADMINISTER 20 UNITS UNDER THE SKIN THREE TIMES DAILY WITH MEALS    pantoprazole (PROTONIX) 40 MG tablet Take 1 tablet (40 mg total) by mouth once daily.    sertraline (ZOLOFT) 50 MG tablet Take 1 tablet (50 mg total) by mouth once daily.    tiotropium (SPIRIVA WITH HANDIHALER) 18 mcg inhalation capsule Inhale 1 capsule (18 mcg total) into the lungs once daily. Controller    blood sugar diagnostic Strp 1 strip by Misc.(Non-Drug; Combo Route) route 6 (six) times daily.    insulin syringe-needle U-100 1 mL 31 gauge x 5/16 Syrg     nitroGLYCERIN (NITROSTAT) 0.3 MG SL tablet Place 1 tablet (0.3 mg total) under the tongue every 5 (five) minutes as needed for Chest pain. No more than 3 tablets in one day.    pen needle, diabetic (BD INSULIN PEN NEEDLE UF SHORT) 31 gauge x 5/16" Ndle Inject 1 each into the skin 3 (three) times daily.    TRUE METRIX AIR GLUCOSE METER kit TEST FOUR TIMES DAILY BEFORE MEALS  AND EVERY NIGHT    VENTOLIN HFA 90 mcg/actuation inhaler      Current Facility-Administered Medications on File Prior to Encounter   Medication    [COMPLETED] aspirin tablet 325 mg    [DISCONTINUED] aspirin chewable tablet 243 mg     Current Outpatient Prescriptions on File " Prior to Encounter   Medication Sig    amiodarone (PACERONE) 200 MG Tab Take 1 tablet (200 mg total) by mouth once daily.    amlodipine (NORVASC) 10 MG tablet Take 1 tablet (10 mg total) by mouth once daily.    apixaban 5 mg Tab Take 1 tablet (5 mg total) by mouth 2 (two) times daily.    aspirin (ECOTRIN) 81 MG EC tablet Take 1 tablet (81 mg total) by mouth once daily.    atorvastatin (LIPITOR) 40 MG tablet Take 1 tablet (40 mg total) by mouth every evening.    fluticasone-salmeterol 100-50 mcg/dose (ADVAIR DISKUS) 100-50 mcg/dose diskus inhaler Inhale 1 puff into the lungs 2 (two) times daily. Controller    gabapentin (NEURONTIN) 300 MG capsule Take 1 capsule (300 mg total) by mouth 3 (three) times daily.    insulin glargine (LANTUS SOLOSTAR) 100 unit/mL (3 mL) InPn pen Inject 50 Units into the skin every evening.    latanoprost 0.005 % ophthalmic solution Place 1 drop into both eyes every evening.    lisinopril (PRINIVIL,ZESTRIL) 5 MG tablet Take 1 tablet (5 mg total) by mouth once daily.    metformin (GLUCOPHAGE) 1000 MG tablet Take 1 tablet (1,000 mg total) by mouth 2 (two) times daily with meals.    metoprolol succinate (TOPROL-XL) 50 MG 24 hr tablet TAKE 1 TABLET EVERY DAY    NOVOLOG FLEXPEN 100 unit/mL InPn pen ADMINISTER 20 UNITS UNDER THE SKIN THREE TIMES DAILY WITH MEALS    pantoprazole (PROTONIX) 40 MG tablet Take 1 tablet (40 mg total) by mouth once daily.    sertraline (ZOLOFT) 50 MG tablet Take 1 tablet (50 mg total) by mouth once daily.    tiotropium (SPIRIVA WITH HANDIHALER) 18 mcg inhalation capsule Inhale 1 capsule (18 mcg total) into the lungs once daily. Controller    blood sugar diagnostic Strp 1 strip by Misc.(Non-Drug; Combo Route) route 6 (six) times daily.    insulin syringe-needle U-100 1 mL 31 gauge x 5/16 Syrg     nitroGLYCERIN (NITROSTAT) 0.3 MG SL tablet Place 1 tablet (0.3 mg total) under the tongue every 5 (five) minutes as needed for Chest pain. No more than 3  "tablets in one day.    pen needle, diabetic (BD INSULIN PEN NEEDLE UF SHORT) 31 gauge x 5/16" Ndle Inject 1 each into the skin 3 (three) times daily.    TRUE METRIX AIR GLUCOSE METER kit TEST FOUR TIMES DAILY BEFORE MEALS  AND EVERY NIGHT    VENTOLIN HFA 90 mcg/actuation inhaler        Physical Exam:  General appearance: alert, appears stated age and cooperative  Head: Normocephalic, without obvious abnormality, atraumatic  Eyes:  conjunctivae/corneas clear. PERRL, EOM's intact. Fundi benign.  Nose: no discharge  Throat: normal findings: tongue midline and normal  Neck: no adenopathy, no carotid bruit, no JVD, supple, symmetrical, trachea midline and thyroid not enlarged, symmetric, no tenderness/mass/nodules  Back:  no skin lesions, erythema, or scars  Lungs:  clear to auscultation bilaterally  Chest wall: no tenderness  Heart: regular rate and rhythm, S1, S2 normal, no murmur, click, rub or gallop  Abdomen: soft, non-tender; bowel sounds normal; no masses,  no organomegaly  Extremities: extremities normal, atraumatic, no cyanosis or edema  Pulses: 1+ and symmetric  Skin: Skin color, texture, turgor normal. No rashes or lesions  Neurologic: Grossly normal    Laboratory:  Chemistry:   Lab Results   Component Value Date     07/12/2017    K 4.2 07/12/2017     07/12/2017    CO2 22 (L) 07/12/2017    BUN 10 07/12/2017    CREATININE 1.1 07/12/2017    CALCIUM 9.6 07/12/2017     Cardiac Markers:   Lab Results   Component Value Date    TROPONINI 0.113 (H) 07/12/2017     Cardiac Markers (Last 3):   Lab Results   Component Value Date    TROPONINI 0.113 (H) 07/12/2017    TROPONINI 0.069 (H) 03/23/2017    TROPONINI 0.085 (H) 03/18/2017     CBC:   Lab Results   Component Value Date    WBC 11.98 07/12/2017    HGB 14.8 07/12/2017    HCT 43.2 07/12/2017    MCV 84 07/12/2017     07/12/2017     Lipids:   Lab Results   Component Value Date    CHOL 171 07/07/2017    TRIG 83 07/07/2017    HDL 42 07/07/2017 "     Coagulation:   Lab Results   Component Value Date    INR 1.0 03/15/2017    APTT 24.5 03/15/2017       Diagnostic Results:  ECG: Reviewed  X-Ray: Reviewed  US: Reviewed  CT: Reviewed  Echo: Reviewed      ASSESSMENT/PLAN:     Patient Active Problem List   Diagnosis    ED (erectile dysfunction)    Testicular hypogonadism    Non-proliferative diabetic retinopathy, mild, both eyes    Hypertension    Diabetic polyneuropathy    Abnormal nuclear stress test    Coronary artery disease involving native coronary artery without angina pectoris    Chronic combined systolic and diastolic congestive heart failure    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, with long-term current use of insulin    SANDRITA on CPAP    Asthma    Delayed sleep phase syndrome    Psychophysiological insomnia    Nightmares    Sleep related gastroesophageal reflux disease    Inadequate sleep hygiene    PAF (paroxysmal atrial fibrillation)    At risk for medication noncompliance    Piriformis muscle pain    Obesity (BMI 30.0-34.9)    Indeterminate stage chronic open angle glaucoma    Atypical chest pain    Elevated troponin    Right hip pain      Patient presents with chest pain, weakness, nausea and dizziness. Troponin mildy elevated 0.113, may be chronicly elevated. Will need to rule out ACS.     Plan:     Will continue current medications and treatment plan  Continue amiodarone, ASA, Eliquis, Statin, ACE and B Blocker   Continue to monitor serial troponin    Chart reviewed. Dr. Pena agrees with plan as outlined above.

## 2017-07-12 NOTE — ASSESSMENT & PLAN NOTE
-chronic x 3 months  -pt currently receiving PT  -xray on 6/16/2017 showed some acetabular lipping bilaterally with exostosis or osteochondroma on the right.  Femoral heads are intact without intraosseous lesion or radiographic evidence of AVN.  No acute fracture or dislocation.  -analgesia prn

## 2017-07-12 NOTE — PROGRESS NOTES
"Subjective:      Patient ID: Crow Green is a 62 y.o. male.    Chief Complaint: Dizziness; Nausea; Headache; and Excessive Sweating    Mr. Green presents to Urgent Care today with complaints of sudden onset of dizziness, sweating, diaphoresis, chest pain, and shortness of breath. Symptoms started suddenly when he was waiting in the lobby upstairs to have some PFTs done. He took all routine medications prior to arrival. CBG this morning was ~250 and BP was 170/100 before a.m. meds. CP is left sided, aching, and non-radiating. Headache is generalized and aching in quality. Rates CP at 9/10 and HA at 10/10.      Dizziness:    Associated symptoms: headaches, nausea, diaphoresis, weakness and chest pain.no palpitations.  Nausea   Associated symptoms include chest pain, diaphoresis, fatigue, headaches, nausea and weakness.   Headache    Associated symptoms include dizziness, nausea, photophobia and weakness.   Excessive Sweating   Associated symptoms include chest pain, diaphoresis, fatigue, headaches, nausea and weakness.     Review of Systems   Constitutional: Positive for diaphoresis and fatigue.   Eyes: Positive for photophobia.   Respiratory: Positive for chest tightness and shortness of breath.    Cardiovascular: Positive for chest pain. Negative for palpitations and leg swelling.   Gastrointestinal: Positive for nausea.   Genitourinary: Negative.    Musculoskeletal: Negative.    Neurological: Positive for dizziness, weakness and headaches.   Hematological: Negative.        Objective:   BP 96/70 (BP Location: Right arm, Patient Position: Sitting, BP Method: Manual)   Pulse 90   Temp 97.6 °F (36.4 °C) (Oral)   Ht 5' 6" (1.676 m)   Wt 90.4 kg (199 lb 4.7 oz)   SpO2 98%   BMI 32.17 kg/m²   Physical Exam   Constitutional: He is oriented to person, place, and time. He appears well-developed and well-nourished. No distress.   HENT:   Head: Normocephalic and atraumatic.   Eyes: Pupils are equal, round, " and reactive to light.   Neck: Normal range of motion. Neck supple. No JVD present. No thyromegaly present.   Cardiovascular: Normal rate, regular rhythm, normal heart sounds and intact distal pulses.    Pulmonary/Chest: Effort normal and breath sounds normal. No respiratory distress.   Musculoskeletal: Normal range of motion.   Lymphadenopathy:     He has no cervical adenopathy.   Neurological: He is alert and oriented to person, place, and time.   Skin: Skin is warm. No rash noted. He is diaphoretic.   Nursing note and vitals reviewed.    Assessment:      1. Chest pain, unspecified type    2. Dizziness    3. Headache, unspecified headache type       Plan:   Chest pain, unspecified type  -     Discontinue: aspirin chewable tablet 243 mg; Take 3 tablets (243 mg total) by mouth once daily.  -     IN OFFICE EKG 12-LEAD (to Muse)  -     aspirin tablet 325 mg; Take 1 tablet (325 mg total) by mouth one time.    Dizziness  -     POCT glucose    Headache, unspecified headache type    EMS activated immediately upon entering room and learning of CP and SOB in addition to the initially reported symptoms of HA, nausea, diaphoresis, and dizziness.  Administered ASA.  EKG obtained: shows septal infarct, LAD, anterior fasicular block, and atrial enlargement. Aside from the fasicular block, EKG appears to be unchanged in comparison to previous.   No NTG administered in Urgent Care due to hypotension.  CBG = 119.  Transported to [facility] ER via ambulance.

## 2017-07-12 NOTE — HPI
Crow Green is a 62 y.o. male patient with PMHx of HTN, DM, Depression, and Asthma who presents to the Emergency Department for left sided CP which onset suddenly today. Pt describes pain as sharp and rates pain an 8/10. Pt completed a pulmonary function test at [facility] when pain onset. No mitigating or exacerbating factors reported. Associated sxs include dizziness, SOB, diaphoresis, and nausea. Pt also reports R hip pain which onset 3 months ago.  Pt seeing provider in [place] for R hip and is currently enrolled in PT for treatment. Pt does not recall name of Provider, Specialty, or diagnosis/etiology for R hip pain.  Patient denies any fever, emesis, diarrhea, abd pain, cough, leg swelling, palpitations, HA, lightheadedness, numbness, weakness, flank pain, dysuria, hematuria, and all other sxs at this time. Prior Tx includes ASA and nitroglycerin by EMS en route with no relief. Pt states pain relieved with Morphine.  Pt reports compliance with prescribed medications and dietary restrictions.

## 2017-07-12 NOTE — SUBJECTIVE & OBJECTIVE
Past Medical History:   Diagnosis Date    Arthritis     Asthma     Depression     Diabetes mellitus     Diabetes mellitus, type 2 2000    Elevated PSA     Hypertension        History reviewed. No pertinent surgical history.    Review of patient's allergies indicates:  No Known Allergies    Current Facility-Administered Medications on File Prior to Encounter   Medication    [COMPLETED] aspirin tablet 325 mg    [DISCONTINUED] aspirin chewable tablet 243 mg     Current Outpatient Prescriptions on File Prior to Encounter   Medication Sig    amiodarone (PACERONE) 200 MG Tab Take 1 tablet (200 mg total) by mouth once daily.    amlodipine (NORVASC) 10 MG tablet Take 1 tablet (10 mg total) by mouth once daily.    apixaban 5 mg Tab Take 1 tablet (5 mg total) by mouth 2 (two) times daily.    aspirin (ECOTRIN) 81 MG EC tablet Take 1 tablet (81 mg total) by mouth once daily.    atorvastatin (LIPITOR) 40 MG tablet Take 1 tablet (40 mg total) by mouth every evening.    fluticasone-salmeterol 100-50 mcg/dose (ADVAIR DISKUS) 100-50 mcg/dose diskus inhaler Inhale 1 puff into the lungs 2 (two) times daily. Controller    gabapentin (NEURONTIN) 300 MG capsule Take 1 capsule (300 mg total) by mouth 3 (three) times daily.    insulin glargine (LANTUS SOLOSTAR) 100 unit/mL (3 mL) InPn pen Inject 50 Units into the skin every evening.    latanoprost 0.005 % ophthalmic solution Place 1 drop into both eyes every evening.    lisinopril (PRINIVIL,ZESTRIL) 5 MG tablet Take 1 tablet (5 mg total) by mouth once daily.    metformin (GLUCOPHAGE) 1000 MG tablet Take 1 tablet (1,000 mg total) by mouth 2 (two) times daily with meals.    metoprolol succinate (TOPROL-XL) 50 MG 24 hr tablet TAKE 1 TABLET EVERY DAY    NOVOLOG FLEXPEN 100 unit/mL InPn pen ADMINISTER 20 UNITS UNDER THE SKIN THREE TIMES DAILY WITH MEALS    pantoprazole (PROTONIX) 40 MG tablet Take 1 tablet (40 mg total) by mouth once daily.    sertraline (ZOLOFT) 50 MG  "tablet Take 1 tablet (50 mg total) by mouth once daily.    tiotropium (SPIRIVA WITH HANDIHALER) 18 mcg inhalation capsule Inhale 1 capsule (18 mcg total) into the lungs once daily. Controller    blood sugar diagnostic Strp 1 strip by Misc.(Non-Drug; Combo Route) route 6 (six) times daily.    insulin syringe-needle U-100 1 mL 31 gauge x 5/16 Syrg     nitroGLYCERIN (NITROSTAT) 0.3 MG SL tablet Place 1 tablet (0.3 mg total) under the tongue every 5 (five) minutes as needed for Chest pain. No more than 3 tablets in one day.    pen needle, diabetic (BD INSULIN PEN NEEDLE UF SHORT) 31 gauge x 5/16" Ndle Inject 1 each into the skin 3 (three) times daily.    TRUE METRIX AIR GLUCOSE METER kit TEST FOUR TIMES DAILY BEFORE MEALS  AND EVERY NIGHT    VENTOLIN HFA 90 mcg/actuation inhaler      Family History     Problem Relation (Age of Onset)    Cataracts Mother, Father, Sister    Glaucoma Mother, Sister, Maternal Aunt        Social History Main Topics    Smoking status: Never Smoker    Smokeless tobacco: Never Used    Alcohol use No    Drug use: No    Sexual activity: Not Currently     Review of Systems   Constitutional: Positive for activity change, diaphoresis and fatigue. Negative for appetite change, chills and fever.   HENT: Negative for congestion, postnasal drip, sinus pressure, sneezing, sore throat and trouble swallowing.    Eyes: Negative.    Respiratory: Positive for shortness of breath. Negative for cough, chest tightness and wheezing.    Cardiovascular: Positive for chest pain. Negative for palpitations and leg swelling.   Gastrointestinal: Positive for nausea. Negative for abdominal distention, abdominal pain, diarrhea and vomiting.   Endocrine: Negative for cold intolerance and heat intolerance.   Genitourinary: Negative for difficulty urinating, dysuria, flank pain, frequency and urgency.   Musculoskeletal: Positive for arthralgias and myalgias. Negative for back pain and joint swelling. "   Allergic/Immunologic: Negative for environmental allergies and food allergies.   Neurological: Positive for dizziness. Negative for syncope, speech difficulty, weakness, numbness and headaches.   Hematological: Does not bruise/bleed easily.   Psychiatric/Behavioral: Negative for agitation, confusion and sleep disturbance. The patient is not nervous/anxious.      Objective:     Vital Signs (Most Recent):  Temp: 98.4 °F (36.9 °C) (07/12/17 1106)  Pulse: 73 (07/12/17 1418)  Resp: 18 (07/12/17 1418)  BP: 110/72 (07/12/17 1418)  SpO2: 97 % (07/12/17 1418) Vital Signs (24h Range):  Temp:  [97.6 °F (36.4 °C)-98.4 °F (36.9 °C)] 98.4 °F (36.9 °C)  Pulse:  [73-90] 73  Resp:  [18-19] 18  SpO2:  [95 %-100 %] 97 %  BP: ()/(62-72) 110/72     Weight: 90.4 kg (199 lb 4.7 oz)  Body mass index is 32.17 kg/m².    Physical Exam   Constitutional: He is oriented to person, place, and time. He appears well-developed and well-nourished.   HENT:   Head: Normocephalic.   Nose: Nose normal.   Mouth/Throat: Oropharynx is clear and moist.   Eyes: Conjunctivae and EOM are normal.   Neck: Normal range of motion. Neck supple.   Cardiovascular: Normal rate, regular rhythm, normal heart sounds and intact distal pulses.  Exam reveals no friction rub.    No murmur heard.  Pulmonary/Chest: Effort normal and breath sounds normal. No respiratory distress. He has no wheezes.   Abdominal: Soft. Bowel sounds are normal. He exhibits no distension. There is no tenderness.   Genitourinary:   Genitourinary Comments: Deferred   Musculoskeletal: Normal range of motion. He exhibits tenderness (R hip).   Limited ROM due to reported pain to R hip   Neurological: He is alert and oriented to person, place, and time.   Skin: Skin is warm and dry. Capillary refill takes less than 2 seconds.   Psychiatric: He has a normal mood and affect. His behavior is normal. Thought content normal.        Significant Labs:   CBC:   Recent Labs  Lab 07/12/17  1142   WBC 11.98    HGB 14.8   HCT 43.2        CMP:   Recent Labs  Lab 07/12/17  1142      K 4.2      CO2 22*      BUN 10   CREATININE 1.1   CALCIUM 9.6   PROT 7.9   ALBUMIN 3.3*   BILITOT 0.4   ALKPHOS 73   AST 29   ALT 36   ANIONGAP 13   EGFRNONAA >60     Troponin:   Recent Labs  Lab 07/12/17  1142   TROPONINI 0.113*       Significant Imaging:   Imaging Results          X-Ray Chest AP Portable (Final result)  Result time 07/12/17 13:05:09    Final result by Devon March MD (07/12/17 13:05:09)                 Impression:       No acute process seen.      Electronically signed by: DEVON MARCH MD  Date:     07/12/17  Time:    13:05              Narrative:    Clinical Data:Chest pain 7    Comparison:  7/12/17    Findings:  Single view of the chest.      Cardiac silhouette is normal.  The lungs demonstrate no evidence of active disease.  No evidence of pleural effusion or pneumothorax.  Bones appear intact.

## 2017-07-12 NOTE — PROGRESS NOTES
Patient feeling nauseated, weak and dizzy at this time. Vitals signs, HR-93 RR-16 Spo2 99%, /67.  Directed by Eri Armstrong to go down and see Urgent Care, no PFT today.

## 2017-07-12 NOTE — H&P
[facility] Hospital Medicine  History & Physical    Patient Name: Crow Green  MRN: [id number]  Admission Date: 7/12/2017  Attending Physician: Ewelina Kay MD   Primary Care Provider: Mateo Lambert DO         Patient information was obtained from patient and ER records.     Subjective:     Principal Problem: Atypical Chest Pain    Chief Complaint:   Chief Complaint   Patient presents with    Chest Pain     and dizziness        HPI:  Crow Green is a 62 y.o. male patient  with PMHx of HTN, DM, Depression, and Asthma who presents to the Emergency Department for left sided CP which onset suddenly today. Pt describes pain as sharp and rates pain an 8/10. Pt completed a pulmonary function test at [facility] when pain onset. No mitigating or exacerbating factors reported. Associated sxs include dizziness, SOB, diaphoresis, and nausea. Pt also reports R hip pain which onset 3 months ago.  Pt seeing provider in [place] for R hip and is currently enrolled in PT for treatment. Pt does not recall name of Provider, Specialty, or diagnosis/etiology for R hip pain.  Patient denies any fever, emesis, diarrhea, abd pain, cough, leg swelling, palpitations, HA, lightheadedness, numbness, weakness, flank pain, dysuria, hematuria, and all other sxs at this time. Prior Tx includes ASA and nitroglycerin by EMS en route with no relief. Pt states pain relieved with Morphine.  Pt reports compliance with prescribed medications and dietary restrictions.      Past Medical History:   Diagnosis Date    Arthritis     Asthma     Depression     Diabetes mellitus     Diabetes mellitus, type 2 2000    Elevated PSA     Hypertension        History reviewed. No pertinent surgical history.    Review of patient's allergies indicates:  No Known Allergies    Current Facility-Administered Medications on File Prior to Encounter   Medication    [COMPLETED] aspirin tablet 325 mg    [DISCONTINUED]  aspirin chewable tablet 243 mg     Current Outpatient Prescriptions on File Prior to Encounter   Medication Sig    amiodarone (PACERONE) 200 MG Tab Take 1 tablet (200 mg total) by mouth once daily.    amlodipine (NORVASC) 10 MG tablet Take 1 tablet (10 mg total) by mouth once daily.    apixaban 5 mg Tab Take 1 tablet (5 mg total) by mouth 2 (two) times daily.    aspirin (ECOTRIN) 81 MG EC tablet Take 1 tablet (81 mg total) by mouth once daily.    atorvastatin (LIPITOR) 40 MG tablet Take 1 tablet (40 mg total) by mouth every evening.    fluticasone-salmeterol 100-50 mcg/dose (ADVAIR DISKUS) 100-50 mcg/dose diskus inhaler Inhale 1 puff into the lungs 2 (two) times daily. Controller    gabapentin (NEURONTIN) 300 MG capsule Take 1 capsule (300 mg total) by mouth 3 (three) times daily.    insulin glargine (LANTUS SOLOSTAR) 100 unit/mL (3 mL) InPn pen Inject 50 Units into the skin every evening.    latanoprost 0.005 % ophthalmic solution Place 1 drop into both eyes every evening.    lisinopril (PRINIVIL,ZESTRIL) 5 MG tablet Take 1 tablet (5 mg total) by mouth once daily.    metformin (GLUCOPHAGE) 1000 MG tablet Take 1 tablet (1,000 mg total) by mouth 2 (two) times daily with meals.    metoprolol succinate (TOPROL-XL) 50 MG 24 hr tablet TAKE 1 TABLET EVERY DAY    NOVOLOG FLEXPEN 100 unit/mL InPn pen ADMINISTER 20 UNITS UNDER THE SKIN THREE TIMES DAILY WITH MEALS    pantoprazole (PROTONIX) 40 MG tablet Take 1 tablet (40 mg total) by mouth once daily.    sertraline (ZOLOFT) 50 MG tablet Take 1 tablet (50 mg total) by mouth once daily.    tiotropium (SPIRIVA WITH HANDIHALER) 18 mcg inhalation capsule Inhale 1 capsule (18 mcg total) into the lungs once daily. Controller    blood sugar diagnostic Strp 1 strip by Misc.(Non-Drug; Combo Route) route 6 (six) times daily.    insulin syringe-needle U-100 1 mL 31 gauge x 5/16 Syrg     nitroGLYCERIN (NITROSTAT) 0.3 MG SL tablet Place 1 tablet (0.3 mg total) under the  "tongue every 5 (five) minutes as needed for Chest pain. No more than 3 tablets in one day.    pen needle, diabetic (BD INSULIN PEN NEEDLE UF SHORT) 31 gauge x 5/16" Ndle Inject 1 each into the skin 3 (three) times daily.    TRUE METRIX AIR GLUCOSE METER kit TEST FOUR TIMES DAILY BEFORE MEALS  AND EVERY NIGHT    VENTOLIN HFA 90 mcg/actuation inhaler      Family History     Problem Relation (Age of Onset)    Cataracts Mother, Father, Sister    Glaucoma Mother, Sister, Maternal Aunt        Social History Main Topics    Smoking status: Never Smoker    Smokeless tobacco: Never Used    Alcohol use No    Drug use: No    Sexual activity: Not Currently     Review of Systems   Constitutional: Positive for activity change, diaphoresis and fatigue. Negative for appetite change, chills and fever.   HENT: Negative for congestion, postnasal drip, sinus pressure, sneezing, sore throat and trouble swallowing.    Eyes: Negative.    Respiratory: Positive for shortness of breath. Negative for cough, chest tightness and wheezing.    Cardiovascular: Positive for chest pain. Negative for palpitations and leg swelling.   Gastrointestinal: Positive for nausea. Negative for abdominal distention, abdominal pain, diarrhea and vomiting.   Endocrine: Negative for cold intolerance and heat intolerance.   Genitourinary: Negative for difficulty urinating, dysuria, flank pain, frequency and urgency.   Musculoskeletal: Positive for arthralgias and myalgias. Negative for back pain and joint swelling.   Allergic/Immunologic: Negative for environmental allergies and food allergies.   Neurological: Positive for dizziness. Negative for syncope, speech difficulty, weakness, numbness and headaches.   Hematological: Does not bruise/bleed easily.   Psychiatric/Behavioral: Negative for agitation, confusion and sleep disturbance. The patient is not nervous/anxious.      Objective:     Vital Signs (Most Recent):  Temp: 98.4 °F (36.9 °C) (07/12/17 " 1106)  Pulse: 73 (07/12/17 1418)  Resp: 18 (07/12/17 1418)  BP: 110/72 (07/12/17 1418)  SpO2: 97 % (07/12/17 1418) Vital Signs (24h Range):  Temp:  [97.6 °F (36.4 °C)-98.4 °F (36.9 °C)] 98.4 °F (36.9 °C)  Pulse:  [73-90] 73  Resp:  [18-19] 18  SpO2:  [95 %-100 %] 97 %  BP: ()/(62-72) 110/72     Weight: 90.4 kg (199 lb 4.7 oz)  Body mass index is 32.17 kg/m².    Physical Exam   Constitutional: He is oriented to person, place, and time. He appears well-developed and well-nourished.   HENT:   Head: Normocephalic.   Nose: Nose normal.   Mouth/Throat: Oropharynx is clear and moist.   Eyes: Conjunctivae and EOM are normal.   Neck: Normal range of motion. Neck supple.   Cardiovascular: Normal rate, regular rhythm, normal heart sounds and intact distal pulses.  Exam reveals no friction rub.    No murmur heard.  Pulmonary/Chest: Effort normal and breath sounds normal. No respiratory distress. He has no wheezes.   Abdominal: Soft. Bowel sounds are normal. He exhibits no distension. There is no tenderness.   Genitourinary:   Genitourinary Comments: Deferred   Musculoskeletal: Normal range of motion. He exhibits tenderness (R hip).   Limited ROM due to reported pain to R hip   Neurological: He is alert and oriented to person, place, and time.   Skin: Skin is warm and dry. Capillary refill takes less than 2 seconds.   Psychiatric: He has a normal mood and affect. His behavior is normal. Thought content normal.        Significant Labs:   CBC:   Recent Labs  Lab 07/12/17  1142   WBC 11.98   HGB 14.8   HCT 43.2        CMP:   Recent Labs  Lab 07/12/17  1142      K 4.2      CO2 22*      BUN 10   CREATININE 1.1   CALCIUM 9.6   PROT 7.9   ALBUMIN 3.3*   BILITOT 0.4   ALKPHOS 73   AST 29   ALT 36   ANIONGAP 13   EGFRNONAA >60     Troponin:   Recent Labs  Lab 07/12/17  1142   TROPONINI 0.113*       Significant Imaging:   Imaging Results          X-Ray Chest AP Portable (Final result)  Result time 07/12/17  13:05:09    Final result by Devon March MD (07/12/17 13:05:09)                 Impression:       No acute process seen.      Electronically signed by: DEVON MARCH MD  Date:     07/12/17  Time:    13:05              Narrative:    Clinical Data:Chest pain 7    Comparison:  7/12/17    Findings:  Single view of the chest.      Cardiac silhouette is normal.  The lungs demonstrate no evidence of active disease.  No evidence of pleural effusion or pneumothorax.  Bones appear intact.                            Assessment/Plan:     Right hip pain    -chronic x 3 months  -pt currently receiving PT  -xray on 6/16/2017 showed some acetabular lipping bilaterally with exostosis or osteochondroma on the right.  Femoral heads are intact without intraosseous lesion or radiographic evidence of AVN.  No acute fracture or dislocation.  -analgesia prn           Elevated troponin    -will follow results of serial troponins  -hx of chronic elevation           Atypical chest pain    Pt admitted to Observation Unit to rule out ACS   -Cardiology consulted  -Lopressor, ASA, Eliquis, and Statin continued   -NTG paste  -2 D ECHO results showed EF 45% with diastolic dysfunction   -LHC per Dr. 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, with long-term current use of insulin    -Accuchecks and SSI continued           Coronary artery disease involving native coronary artery without angina pectoris     -ASA, Lopressor, and Statin continued           Hypertension    -controlled  -resume home medications            VTE Risk Mitigation         Ordered     apixaban tablet 5 mg  2 times daily     Route:  Oral        07/12/17 1505     High Risk of VTE  Once      07/12/17 1312     Place sequential compression device  Until discontinued      07/12/17 1312     Reason for No Pharmacological VTE Prophylaxis  Once      07/12/17 1312        Annabelle Choudhary NP  Department of Hospital Medicine   [facility]

## 2017-07-12 NOTE — ASSESSMENT & PLAN NOTE
Pt admitted to Observation Unit to rule out ACS   -Cardiology consulted  -Lopressor, ASA, Eliquis, and Statin continued   -NTG paste  -2 D ECHO results showed EF 45% with diastolic dysfunction   -LHC per Dr. Pena in 11/2016 showed small vessel disease  -Morphine prn  -supplemental oxygen  -will follow serial troponin results  -EKG reviewed with Cardiology and chronic changes noted

## 2017-07-12 NOTE — PROGRESS NOTES
Pt received from ER via stretcher and transferred to bed in room 235 independently. O2 at 2 liters per nasal cannula continues. No shortness of breath reported. Oriented to room, call light system. No questions at this time. Telemetry monitor applied.

## 2017-07-12 NOTE — ASSESSMENT & PLAN NOTE
-Patty prn   -Ipratropium nebulizer  -Budesonide nebulizer treatment  -Brovana nebulizer treatment

## 2017-07-13 VITALS
TEMPERATURE: 97 F | HEIGHT: 66 IN | DIASTOLIC BLOOD PRESSURE: 70 MMHG | OXYGEN SATURATION: 96 % | RESPIRATION RATE: 20 BRPM | HEART RATE: 87 BPM | BODY MASS INDEX: 32.03 KG/M2 | SYSTOLIC BLOOD PRESSURE: 114 MMHG | WEIGHT: 199.31 LBS

## 2017-07-13 LAB
ALBUMIN SERPL BCP-MCNC: 2.8 G/DL
ALP SERPL-CCNC: 69 U/L
ALT SERPL W/O P-5'-P-CCNC: 34 U/L
ANION GAP SERPL CALC-SCNC: 10 MMOL/L
AST SERPL-CCNC: 27 U/L
BASOPHILS # BLD AUTO: 0.01 K/UL
BASOPHILS NFR BLD: 0.1 %
BILIRUB SERPL-MCNC: 0.4 MG/DL
BUN SERPL-MCNC: 20 MG/DL
CALCIUM SERPL-MCNC: 8.6 MG/DL
CHLORIDE SERPL-SCNC: 102 MMOL/L
CO2 SERPL-SCNC: 23 MMOL/L
CREAT SERPL-MCNC: 1.3 MG/DL
DIASTOLIC DYSFUNCTION: NO
DIFFERENTIAL METHOD: ABNORMAL
EOSINOPHIL # BLD AUTO: 0.1 K/UL
EOSINOPHIL NFR BLD: 1 %
ERYTHROCYTE [DISTWIDTH] IN BLOOD BY AUTOMATED COUNT: 14.2 %
EST. GFR  (AFRICAN AMERICAN): >60 ML/MIN/1.73 M^2
EST. GFR  (NON AFRICAN AMERICAN): 59 ML/MIN/1.73 M^2
GLUCOSE SERPL-MCNC: 174 MG/DL
HCT VFR BLD AUTO: 40.7 %
HGB BLD-MCNC: 13.4 G/DL
LYMPHOCYTES # BLD AUTO: 3.9 K/UL
LYMPHOCYTES NFR BLD: 28.6 %
MCH RBC QN AUTO: 28.5 PG
MCHC RBC AUTO-ENTMCNC: 32.9 %
MCV RBC AUTO: 86 FL
MONOCYTES # BLD AUTO: 1 K/UL
MONOCYTES NFR BLD: 7.3 %
NEUTROPHILS # BLD AUTO: 8.6 K/UL
NEUTROPHILS NFR BLD: 63 %
PLATELET # BLD AUTO: 173 K/UL
PMV BLD AUTO: 9.4 FL
POCT GLUCOSE: 151 MG/DL (ref 70–110)
POCT GLUCOSE: 182 MG/DL (ref 70–110)
POTASSIUM SERPL-SCNC: 4.2 MMOL/L
PROT SERPL-MCNC: 6.8 G/DL
RBC # BLD AUTO: 4.71 M/UL
SODIUM SERPL-SCNC: 135 MMOL/L
TROPONIN I SERPL DL<=0.01 NG/ML-MCNC: 0.07 NG/ML
WBC # BLD AUTO: 13.58 K/UL

## 2017-07-13 PROCEDURE — 25000003 PHARM REV CODE 250: Performed by: NURSE PRACTITIONER

## 2017-07-13 PROCEDURE — 25000242 PHARM REV CODE 250 ALT 637 W/ HCPCS: Performed by: NURSE PRACTITIONER

## 2017-07-13 PROCEDURE — 96376 TX/PRO/DX INJ SAME DRUG ADON: CPT | Mod: 76

## 2017-07-13 PROCEDURE — 96375 TX/PRO/DX INJ NEW DRUG ADDON: CPT

## 2017-07-13 PROCEDURE — 80053 COMPREHEN METABOLIC PANEL: CPT

## 2017-07-13 PROCEDURE — 85025 COMPLETE CBC W/AUTO DIFF WBC: CPT

## 2017-07-13 PROCEDURE — 96374 THER/PROPH/DIAG INJ IV PUSH: CPT

## 2017-07-13 PROCEDURE — 36415 COLL VENOUS BLD VENIPUNCTURE: CPT

## 2017-07-13 PROCEDURE — 78452 HT MUSCLE IMAGE SPECT MULT: CPT | Mod: 26,,, | Performed by: INTERNAL MEDICINE

## 2017-07-13 PROCEDURE — 63600175 PHARM REV CODE 636 W HCPCS: Performed by: NURSE PRACTITIONER

## 2017-07-13 PROCEDURE — 93016 CV STRESS TEST SUPVJ ONLY: CPT | Mod: ,,, | Performed by: INTERNAL MEDICINE

## 2017-07-13 PROCEDURE — G0378 HOSPITAL OBSERVATION PER HR: HCPCS

## 2017-07-13 PROCEDURE — 94640 AIRWAY INHALATION TREATMENT: CPT

## 2017-07-13 PROCEDURE — 84484 ASSAY OF TROPONIN QUANT: CPT

## 2017-07-13 PROCEDURE — 25000003 PHARM REV CODE 250: Performed by: INTERNAL MEDICINE

## 2017-07-13 PROCEDURE — 93018 CV STRESS TEST I&R ONLY: CPT | Mod: ,,, | Performed by: INTERNAL MEDICINE

## 2017-07-13 PROCEDURE — 63600175 PHARM REV CODE 636 W HCPCS: Performed by: INTERNAL MEDICINE

## 2017-07-13 PROCEDURE — 99224 PR SUBSEQUENT OBSERVATION CARE,LEVEL I: CPT | Mod: ,,, | Performed by: INTERNAL MEDICINE

## 2017-07-13 RX ORDER — FUROSEMIDE 10 MG/ML
40 INJECTION INTRAMUSCULAR; INTRAVENOUS ONCE
Status: COMPLETED | OUTPATIENT
Start: 2017-07-13 | End: 2017-07-13

## 2017-07-13 RX ORDER — ISOSORBIDE MONONITRATE 30 MG/1
30 TABLET, EXTENDED RELEASE ORAL DAILY
Status: DISCONTINUED | OUTPATIENT
Start: 2017-07-13 | End: 2017-07-13 | Stop reason: HOSPADM

## 2017-07-13 RX ORDER — ISOSORBIDE MONONITRATE 30 MG/1
30 TABLET, EXTENDED RELEASE ORAL DAILY
Qty: 30 TABLET | Refills: 0 | Status: SHIPPED | OUTPATIENT
Start: 2017-07-13 | End: 2018-01-11

## 2017-07-13 RX ORDER — REGADENOSON 0.08 MG/ML
0.4 INJECTION, SOLUTION INTRAVENOUS ONCE
Status: COMPLETED | OUTPATIENT
Start: 2017-07-13 | End: 2017-07-13

## 2017-07-13 RX ADMIN — ARFORMOTEROL TARTRATE 15 MCG: 15 SOLUTION RESPIRATORY (INHALATION) at 07:07

## 2017-07-13 RX ADMIN — GABAPENTIN 300 MG: 300 CAPSULE ORAL at 05:07

## 2017-07-13 RX ADMIN — ASPIRIN 81 MG: 81 TABLET, COATED ORAL at 10:07

## 2017-07-13 RX ADMIN — MORPHINE SULFATE 2 MG: 2 INJECTION, SOLUTION INTRAMUSCULAR; INTRAVENOUS at 05:07

## 2017-07-13 RX ADMIN — SERTRALINE HYDROCHLORIDE 50 MG: 50 TABLET ORAL at 10:07

## 2017-07-13 RX ADMIN — AMIODARONE HYDROCHLORIDE 200 MG: 200 TABLET ORAL at 10:07

## 2017-07-13 RX ADMIN — REGADENOSON 0.4 MG: 0.08 INJECTION, SOLUTION INTRAVENOUS at 03:07

## 2017-07-13 RX ADMIN — LISINOPRIL 5 MG: 5 TABLET ORAL at 10:07

## 2017-07-13 RX ADMIN — GABAPENTIN 300 MG: 300 CAPSULE ORAL at 03:07

## 2017-07-13 RX ADMIN — ISOSORBIDE MONONITRATE 30 MG: 30 TABLET, EXTENDED RELEASE ORAL at 10:07

## 2017-07-13 RX ADMIN — FUROSEMIDE 40 MG: 10 INJECTION, SOLUTION INTRAMUSCULAR; INTRAVENOUS at 11:07

## 2017-07-13 RX ADMIN — BUDESONIDE 0.5 MG: 0.5 SUSPENSION RESPIRATORY (INHALATION) at 07:07

## 2017-07-13 RX ADMIN — PANTOPRAZOLE SODIUM 40 MG: 40 TABLET, DELAYED RELEASE ORAL at 10:07

## 2017-07-13 RX ADMIN — IPRATROPIUM BROMIDE 0.5 MG: 0.5 SOLUTION RESPIRATORY (INHALATION) at 07:07

## 2017-07-13 RX ADMIN — NITROGLYCERIN 1 INCH: 20 OINTMENT TOPICAL at 05:07

## 2017-07-13 RX ADMIN — APIXABAN 5 MG: 2.5 TABLET, FILM COATED ORAL at 10:07

## 2017-07-13 NOTE — PROGRESS NOTES
Pt given dc instructions. He verbalized understanding. Cards FU in place. Pt states he will make his PCP apt. Pt denies having further questions. Pt states he is having ride difficulties. He states he doesn't have money for a cab. I let him know that he has been dc'd and when he reaches someone that can get him it doesn't matter the time. He states he will make a few more calls. PIV and tele monitor still in place until ride confirmed.

## 2017-07-13 NOTE — NURSING
2117 patient complains of chest pain 10/10 bp 109/56 BR 70 NSR on monitor, o2 96 % on 2 LNC.  Morphine 2 mg iv given, orders for ekg and stat cardiac enzymes given.     2130 EKG reviewed by Dr. Jacobs and AILEEN Arreola  2148 Cardiac enzymes sent to lab, troponin trending down  AILEEN Arreola notified of troponin, chest pain decreased to 6/10 VSS, patient is resting with eyes closed.   Will continue to monitor.

## 2017-07-13 NOTE — PLAN OF CARE
Problem: Patient Care Overview  Goal: Plan of Care Review  Outcome: Ongoing (interventions implemented as appropriate)  pateint had an episode of chest pain at beginning of shift, has resolved since, monitor cardiac enzymes, remains sr on monitor. Will continue to monitor.

## 2017-07-13 NOTE — PROGRESS NOTES
Cardiology Progress Note        SUBJECTIVE:     History of Present Illness:  Patient is a 62 y.o. male presents with atypical chest pain. He felt some chest disconfort throughout the night, troponin trending downward. Patient also having some upper abdominal pain as well.        OBJECTIVE:     Vital Signs (Most Recent)  Temp: 97.6 °F (36.4 °C) (07/13/17 1200)  Pulse: 80 (07/13/17 1200)  Resp: 20 (07/13/17 1200)  BP: 127/78 (07/13/17 1200)  SpO2: 98 % (07/13/17 1200)    Vital Signs Range (Last 24H):  Temp:  [97.5 °F (36.4 °C)-97.9 °F (36.6 °C)]   Pulse:  [69-82]   Resp:  [18-20]   BP: (110-128)/(61-78)   SpO2:  [93 %-98 %]     Intake/Output last 3 shifts:  I/O last 3 completed shifts:  In: 360 [P.O.:360]  Out: 500 [Urine:500]    Intake/Output this shift:  I/O this shift:  In: -   Out: 400 [Urine:400]    Review of patient's allergies indicates:  No Known Allergies    Current Facility-Administered Medications   Medication    albuterol-ipratropium 2.5mg-0.5mg/3mL nebulizer solution 3 mL    amiodarone tablet 200 mg    apixaban tablet 5 mg    arformoterol nebulizer solution 15 mcg    aspirin EC tablet 81 mg    atorvastatin tablet 40 mg    budesonide nebulizer solution 0.5 mg    dextrose 50% injection 12.5 g    dextrose 50% injection 25 g    gabapentin capsule 300 mg    glucagon (human recombinant) injection 1 mg    glucose chewable tablet 16 g    glucose chewable tablet 24 g    insulin aspart pen 0-5 Units    ipratropium 0.02 % nebulizer solution 0.5 mg    isosorbide mononitrate 24 hr tablet 30 mg    lisinopril tablet 5 mg    metoprolol succinate (TOPROL-XL) 24 hr tablet 50 mg    morphine injection 2 mg    nitroGLYCERIN SL tablet 0.4 mg    pantoprazole EC tablet 40 mg    sertraline tablet 50 mg     No current facility-administered medications on file prior to encounter.      Current Outpatient Prescriptions on File Prior to Encounter   Medication Sig    amiodarone (PACERONE) 200 MG Tab Take 1 tablet  (200 mg total) by mouth once daily.    amlodipine (NORVASC) 10 MG tablet Take 1 tablet (10 mg total) by mouth once daily.    apixaban 5 mg Tab Take 1 tablet (5 mg total) by mouth 2 (two) times daily.    aspirin (ECOTRIN) 81 MG EC tablet Take 1 tablet (81 mg total) by mouth once daily.    atorvastatin (LIPITOR) 40 MG tablet Take 1 tablet (40 mg total) by mouth every evening.    fluticasone-salmeterol 100-50 mcg/dose (ADVAIR DISKUS) 100-50 mcg/dose diskus inhaler Inhale 1 puff into the lungs 2 (two) times daily. Controller    gabapentin (NEURONTIN) 300 MG capsule Take 1 capsule (300 mg total) by mouth 3 (three) times daily.    insulin glargine (LANTUS SOLOSTAR) 100 unit/mL (3 mL) InPn pen Inject 50 Units into the skin every evening.    latanoprost 0.005 % ophthalmic solution Place 1 drop into both eyes every evening.    lisinopril (PRINIVIL,ZESTRIL) 5 MG tablet Take 1 tablet (5 mg total) by mouth once daily.    metformin (GLUCOPHAGE) 1000 MG tablet Take 1 tablet (1,000 mg total) by mouth 2 (two) times daily with meals.    metoprolol succinate (TOPROL-XL) 50 MG 24 hr tablet TAKE 1 TABLET EVERY DAY    NOVOLOG FLEXPEN 100 unit/mL InPn pen ADMINISTER 20 UNITS UNDER THE SKIN THREE TIMES DAILY WITH MEALS    pantoprazole (PROTONIX) 40 MG tablet Take 1 tablet (40 mg total) by mouth once daily.    sertraline (ZOLOFT) 50 MG tablet Take 1 tablet (50 mg total) by mouth once daily.    tiotropium (SPIRIVA WITH HANDIHALER) 18 mcg inhalation capsule Inhale 1 capsule (18 mcg total) into the lungs once daily. Controller    blood sugar diagnostic Strp 1 strip by Misc.(Non-Drug; Combo Route) route 6 (six) times daily.    insulin syringe-needle U-100 1 mL 31 gauge x 5/16 Syrg     nitroGLYCERIN (NITROSTAT) 0.3 MG SL tablet Place 1 tablet (0.3 mg total) under the tongue every 5 (five) minutes as needed for Chest pain. No more than 3 tablets in one day.    pen needle, diabetic (BD INSULIN PEN NEEDLE UF SHORT) 31 gauge x  "5/16" Ndle Inject 1 each into the skin 3 (three) times daily.    TRUE METRIX AIR GLUCOSE METER kit TEST FOUR TIMES DAILY BEFORE MEALS  AND EVERY NIGHT    VENTOLIN HFA 90 mcg/actuation inhaler        Physical Exam:  General appearance: alert, appears stated age and cooperative  Head: Normocephalic, without obvious abnormality, atraumatic  Eyes:  conjunctivae/corneas clear. PERRL, EOM's intact. Fundi benign.  Nose: no discharge  Throat: normal findings: tongue midline and normal  Neck: no adenopathy, no carotid bruit, no JVD, supple, symmetrical, trachea midline and thyroid not enlarged, symmetric, no tenderness/mass/nodules  Back:  no skin lesions, erythema, or scars  Lungs:  clear to auscultation bilaterally  Chest wall: no tenderness  Heart: regular rate and rhythm, S1, S2 normal, no murmur, click, rub or gallop  Abdomen: mildly large abdomen  Extremities: extremities normal, atraumatic, no cyanosis or edema  Pulses: 1+ and symmetric  Skin: Skin color, texture, turgor normal. No rashes or lesions  Neurologic: Grossly normal    Laboratory:  Chemistry:   Lab Results   Component Value Date     (L) 07/13/2017    K 4.2 07/13/2017     07/13/2017    CO2 23 07/13/2017    BUN 20 07/13/2017    CREATININE 1.3 07/13/2017    CALCIUM 8.6 (L) 07/13/2017     Cardiac Markers:   Lab Results   Component Value Date    TROPONINI 0.065 (H) 07/13/2017     Cardiac Markers (Last 3):   Lab Results   Component Value Date    TROPONINI 0.065 (H) 07/13/2017    TROPONINI 0.085 (H) 07/12/2017    TROPONINI 0.113 (H) 07/12/2017     CBC:   Lab Results   Component Value Date    WBC 13.58 (H) 07/13/2017    HGB 13.4 (L) 07/13/2017    HCT 40.7 07/13/2017    MCV 86 07/13/2017     07/13/2017     Lipids:   Lab Results   Component Value Date    CHOL 171 07/07/2017    TRIG 83 07/07/2017    HDL 42 07/07/2017     Coagulation:   Lab Results   Component Value Date    INR 1.0 03/15/2017    APTT 24.5 03/15/2017       Diagnostic Results:  ECG: " Reviewed  X-Ray: Reviewed  US: Reviewed  CT: Reviewed  Echo: Reviewed      ASSESSMENT/PLAN:     Patient Active Problem List   Diagnosis    ED (erectile dysfunction)    Testicular hypogonadism    Non-proliferative diabetic retinopathy, mild, both eyes    Hypertension    Diabetic polyneuropathy    Abnormal nuclear stress test    Coronary artery disease involving native coronary artery without angina pectoris    Chronic combined systolic and diastolic congestive heart failure    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, with long-term current use of insulin    SANDRITA on CPAP    Asthma    Delayed sleep phase syndrome    Psychophysiological insomnia    Nightmares    Sleep related gastroesophageal reflux disease    Inadequate sleep hygiene    PAF (paroxysmal atrial fibrillation)    At risk for medication noncompliance    Piriformis muscle pain    Obesity (BMI 30.0-34.9)    Indeterminate stage chronic open angle glaucoma    Atypical chest pain    Elevated troponin    Right hip pain        Plan:     Will continue current medical management   Continue Eliquis, ASA, amiodarone, ACE and Imdur   Abdominal ultrasound   Pharm stress test today     Chart reviewed. Dr. Pena agrees with plan as outlined above.

## 2017-07-14 NOTE — DISCHARGE SUMMARY
[facility] Hospital Medicine  Discharge Summary      Patient Name: Crow Green  MRN: [id number]  Admission Date: 7/12/2017  Hospital Length of Stay: 0 days  Discharge Date and Time: 7/13/2017  7:37 PM  Attending Physician: No att. providers found   Discharging Provider: Justice Cai MD  Primary Care Provider: Mateo Lambert DO      HPI:    Crow Green is a 62 y.o. male patient  with PMHx of HTN, DM, Depression, and Asthma who presents to the Emergency Department for left sided CP which onset suddenly today. Pt describes pain as sharp and rates pain an 8/10. Pt completed a pulmonary function test at [facility] when pain onset. No mitigating or exacerbating factors reported. Associated sxs include dizziness, SOB, diaphoresis, and nausea. Pt also reports R hip pain which onset 3 months ago.  Pt seeing provider in [place] for R hip and is currently enrolled in PT for treatment. Pt does not recall name of Provider, Specialty, or diagnosis/etiology for R hip pain.  Patient denies any fever, emesis, diarrhea, abd pain, cough, leg swelling, palpitations, HA, lightheadedness, numbness, weakness, flank pain, dysuria, hematuria, and all other sxs at this time. Prior Tx includes ASA and nitroglycerin by EMS en route with no relief. Pt states pain relieved with Morphine.  Pt reports compliance with prescribed medications and dietary restrictions.      * No surgery found *      Indwelling Lines/Drains at time of discharge:   Lines/Drains/Airways          No matching active lines, drains, or airways        Hospital Course:   Patient presented to hospital with a complaint of chest pain. Patient placed in observation ruled out for ACS. Patient underwent a stress test revealing no reversible ischemia. Patient evaluated by cardiology who noted the chronic troponin leak and adjusted his cardiac medications. Patient was seen and examined prior to his discharge and deemed stable for a safe  discharge to home.     Consults:   Consults         Status Ordering Provider     Inpatient consult to Cardiology  Once     Provider:  (Not yet assigned)    Completed URI HESS          Significant Diagnostic Studies: Labs:   BMP:   Recent Labs  Lab 07/13/17  0340   *   *   K 4.2      CO2 23   BUN 20   CREATININE 1.3   CALCIUM 8.6*   , CMP   Recent Labs  Lab 07/13/17  0340   *   K 4.2      CO2 23   *   BUN 20   CREATININE 1.3   CALCIUM 8.6*   PROT 6.8   ALBUMIN 2.8*   BILITOT 0.4   ALKPHOS 69   AST 27   ALT 34   ANIONGAP 10   ESTGFRAFRICA >60   EGFRNONAA 59*   , CBC   Recent Labs  Lab 07/13/17  0340   WBC 13.58*   HGB 13.4*   HCT 40.7      , INR   Lab Results   Component Value Date    INR 1.0 03/15/2017    INR 1.0 11/16/2016    INR 1.0 11/15/2016   , Troponin   Recent Labs  Lab 07/13/17  0340   TROPONINI 0.065*    and All labs within the past 24 hours have been reviewed    Pending Diagnostic Studies:     None        Final Active Diagnoses:    Diagnosis Date Noted POA    PRINCIPAL PROBLEM:  Atypical chest pain [R07.89] 07/12/2017 Yes    Elevated troponin [R74.8] 07/12/2017 Unknown    Right hip pain [M25.551] 07/12/2017 Unknown    Obesity (BMI 30.0-34.9) [E66.9] 05/30/2017 Yes    PAF (paroxysmal atrial fibrillation) [I48.0] 03/17/2017 Yes    Asthma [J45.909] 01/12/2017 Yes    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  Service: Hospital Medicine      Discharged  Condition: stable    Disposition: Home or Self Care    Follow Up:  Follow-up Information     Mateo Lambert DO In 3 days.    Specialty:  Family Medicine  Why:  hospital follow up   Contact information:  [address] [id number]  [place] [address] [phone]             Brandon Pena MD In 2 weeks.    Specialties:  Cardiology, INTERVENTIONAL CARDIOLOGY  Contact information:  [address]  [place] [address] [phone]                 Patient Instructions:     Diet general   Order Specific Question Answer Comments   Na restriction, if any: 2gNa    Additional restrictions: Diabetic 1800      Activity as tolerated     Call MD for:  temperature >100.4     Call MD for:  persistent nausea and vomiting or diarrhea     Call MD for:  severe uncontrolled pain     Call MD for:  difficulty breathing or increased cough     Call MD for:  persistent dizziness, light-headedness, or visual disturbances     Call MD for:  increased confusion or weakness       Medications:  Reconciled Home Medications:   Discharge Medication List as of 7/13/2017  6:18 PM      START taking these medications    Details   isosorbide mononitrate (IMDUR) 30 MG 24 hr tablet Take 1 tablet (30 mg total) by mouth once daily., Starting Thu 7/13/2017, Until Sat 8/12/2017, Normal         CONTINUE these medications which have NOT CHANGED    Details   amiodarone (PACERONE) 200 MG Tab Take 1 tablet (200 mg total) by mouth once daily., Starting 4/28/2017, Until Sat 4/28/18, Normal      amlodipine (NORVASC) 10 MG tablet Take 1 tablet (10 mg total) by mouth once daily., Starting 1/30/2017, Until Discontinued, Normal      apixaban 5 mg Tab Take 1 tablet (5 mg total) by mouth 2 (two) times daily., Starting 3/20/2017, Until Discontinued, Normal      aspirin (ECOTRIN) 81 MG EC tablet Take 1 tablet (81 mg total) by mouth once daily., Starting 11/18/2016, Until Sat 11/18/17, OTC      atorvastatin (LIPITOR) 40 MG tablet Take 1 tablet (40 mg total) by mouth every evening.,  Starting 1/30/2017, Until Tue 1/30/18, Normal      fluticasone-salmeterol 100-50 mcg/dose (ADVAIR DISKUS) 100-50 mcg/dose diskus inhaler Inhale 1 puff into the lungs 2 (two) times daily. Controller, Starting 1/31/2017, Until Wed 1/31/18, Normal      gabapentin (NEURONTIN) 300 MG capsule Take 1 capsule (300 mg total) by mouth 3 (three) times daily., Starting 1/30/2017, Until Discontinued, Normal      insulin glargine (LANTUS SOLOSTAR) 100 unit/mL (3 mL) InPn pen Inject 50 Units into the skin every evening., Starting 1/30/2017, Until Discontinued, No Print      latanoprost 0.005 % ophthalmic solution Place 1 drop into both eyes every evening., Starting 12/7/2016, Until Thu 12/7/17, Normal      lisinopril (PRINIVIL,ZESTRIL) 5 MG tablet Take 1 tablet (5 mg total) by mouth once daily., Starting 2/16/2017, Until Fri 2/16/18, Normal      metformin (GLUCOPHAGE) 1000 MG tablet Take 1 tablet (1,000 mg total) by mouth 2 (two) times daily with meals., Starting 1/30/2017, Until Discontinued, Normal      metoprolol succinate (TOPROL-XL) 50 MG 24 hr tablet TAKE 1 TABLET EVERY DAY, Normal      NOVOLOG FLEXPEN 100 unit/mL InPn pen ADMINISTER 20 UNITS UNDER THE SKIN THREE TIMES DAILY WITH MEALS, Normal      pantoprazole (PROTONIX) 40 MG tablet Take 1 tablet (40 mg total) by mouth once daily., Starting 1/30/2017, Until Discontinued, Normal      sertraline (ZOLOFT) 50 MG tablet Take 1 tablet (50 mg total) by mouth once daily., Starting 1/30/2017, Until Tue 1/30/18, Normal      tiotropium (SPIRIVA WITH HANDIHALER) 18 mcg inhalation capsule Inhale 1 capsule (18 mcg total) into the lungs once daily. Controller, Starting 1/31/2017, Until Wed 1/31/18, Normal      blood sugar diagnostic Strp 1 strip by Misc.(Non-Drug; Combo Route) route 6 (six) times daily., Starting 1/18/2017, Until Thu 1/18/18, Print      insulin syringe-needle U-100 1 mL 31 gauge x 5/16 Syrg Starting 11/8/2016, Until Discontinued, Historical Med      nitroGLYCERIN  "(NITROSTAT) 0.3 MG SL tablet Place 1 tablet (0.3 mg total) under the tongue every 5 (five) minutes as needed for Chest pain. No more than 3 tablets in one day., Starting 11/8/2016, Until Wed 11/8/17, Normal      pen needle, diabetic (BD INSULIN PEN NEEDLE UF SHORT) 31 gauge x 5/16" Ndle Inject 1 each into the skin 3 (three) times daily., Starting Wed 6/14/2017, Normal      TRUE METRIX AIR GLUCOSE METER kit TEST FOUR TIMES DAILY BEFORE MEALS  AND EVERY NIGHT, Normal      VENTOLIN HFA 90 mcg/actuation inhaler Starting 5/5/2017, Until Discontinued, Historical Med           Time spent on the discharge of patient: 30 minutes    Justice Cai MD  Department of Hospital Medicine  [facility]  "

## 2017-07-14 NOTE — HOSPITAL COURSE
Patient presented to hospital with a complaint of chest pain. Patient placed in observation ruled out for ACS. Patient underwent a stress test revealing no reversible ischemia. Patient evaluated by cardiology who noted the chronic troponin leak and adjusted his cardiac medications. Patient was seen and examined prior to his discharge and deemed stable for a safe discharge to home.

## 2017-07-20 ENCOUNTER — OFFICE VISIT (OUTPATIENT)
Dept: DIABETES | Facility: CLINIC | Age: 62
End: 2017-07-20
Payer: MEDICARE

## 2017-07-20 ENCOUNTER — LAB VISIT (OUTPATIENT)
Dept: LAB | Facility: HOSPITAL | Age: 62
End: 2017-07-20
Attending: PEDIATRICS
Payer: MEDICARE

## 2017-07-20 ENCOUNTER — CLINICAL SUPPORT (OUTPATIENT)
Dept: DIABETES | Facility: CLINIC | Age: 62
End: 2017-07-20
Payer: MEDICARE

## 2017-07-20 VITALS
WEIGHT: 207.25 LBS | SYSTOLIC BLOOD PRESSURE: 106 MMHG | DIASTOLIC BLOOD PRESSURE: 68 MMHG | HEIGHT: 65 IN | BODY MASS INDEX: 34.53 KG/M2

## 2017-07-20 DIAGNOSIS — E11.42 DIABETIC POLYNEUROPATHY ASSOCIATED WITH TYPE 2 DIABETES MELLITUS: ICD-10-CM

## 2017-07-20 LAB
C PEPTIDE SERPL-MCNC: 4.37 NG/ML
GLUCOSE SERPL-MCNC: 267 MG/DL (ref 70–110)
TSH SERPL DL<=0.005 MIU/L-ACNC: 2.37 UIU/ML

## 2017-07-20 PROCEDURE — 82948 REAGENT STRIP/BLOOD GLUCOSE: CPT | Mod: S$GLB,,, | Performed by: PHYSICIAN ASSISTANT

## 2017-07-20 PROCEDURE — 99214 OFFICE O/P EST MOD 30 MIN: CPT | Mod: S$GLB,,, | Performed by: PHYSICIAN ASSISTANT

## 2017-07-20 PROCEDURE — 3046F HEMOGLOBIN A1C LEVEL >9.0%: CPT | Mod: S$GLB,,, | Performed by: PHYSICIAN ASSISTANT

## 2017-07-20 PROCEDURE — 95250 CONT GLUC MNTR PHYS/QHP EQP: CPT | Mod: S$GLB,,, | Performed by: PHYSICIAN ASSISTANT

## 2017-07-20 PROCEDURE — 99999 PR PBB SHADOW E&M-EST. PATIENT-LVL V: CPT | Mod: PBBFAC,,, | Performed by: PHYSICIAN ASSISTANT

## 2017-07-20 PROCEDURE — 4010F ACE/ARB THERAPY RXD/TAKEN: CPT | Mod: S$GLB,,, | Performed by: PHYSICIAN ASSISTANT

## 2017-07-20 NOTE — PROGRESS NOTES
Subjective:      Patient ID: Crow Green is a 62 y.o. male.    PCP: Mateo Lambert DO      Crow Green is a pleasant 62 y.o. male presenting to follow up on diabetes mellitus. He has had diabetes for 17 or more years. His last visit in Diabetes Management was 6/14/2017 Since that time he has had no improvement in his glycemia. His blood sugar range fasting has been 180 and 2 hour post meal has been 200+, and he has been monitoring 5 times per day as directed. His current concerns are glycemic control.    He reports recent hospital admissions for chest pain, through emergency room visits, He denies hypoglycemia, syncope.  He relates a history of coming to the clinic for pulmonary evaluation with pulmonary functions test.  However during his waking.  He started developing chest discomfort and diaphoresis.  When he was called to the back to have the tests he explained to the unrelated and he was not able to perform the test as he was having shortness of breath chest pain and sweating a lot.  He was transferred to the emergency room via ambulance for further evaluation and treatment.  During his hospitalization from Wednesday until Friday there was no cardiovascular irregularity and no etiology for his chest pains file.  Currently he is at his baseline.    He has gained 8 pounds since last visit. His BMI is 34.23    His blood sugar in the clinic today was:   Lab Results   Component Value Date    POCGLU 267 (A) 07/20/2017     We discussed the American diabetes Association recommendations:  hemoglobin A1c below 7.0%; all diabetics should be on statins unless contraindicated; one aspirin daily unless contraindicated; fasting blood sugar between 80 and 130 mg/dL; postprandial blood sugar below 180 mg/dl; prevention of hypoglycemia, may adjust goals to higher levels if persistent; ACE or ARB therapy if not contraindicated; and maintain in an ideal body weight with BMI below 25.    Crow is compliant most of  the time with DM medications.     Crow is noncompliant some of the time with lifestyle modifications to include activity and meal planning.       STANDARDS OF CARE:  Eye doctor: Dr. Dias, last exam 6/13/2017.  Dental exam: Recommend regular exams; denies gums bleeding.  Podiatry doctor:     ACTIVITY LEVEL: He exercises rarely.  BLOOD GLUCOSE TESTING: Self-monitoring with   SOCIAL HISTORY: . Lives with spouse. retired no tobacco use    The following results were reviewed with patient.    Lab Results   Component Value Date    WBC 13.58 (H) 07/13/2017    HGB 13.4 (L) 07/13/2017    HCT 40.7 07/13/2017     07/13/2017    CHOL 171 07/07/2017    TRIG 83 07/07/2017    HDL 42 07/07/2017    ALT 34 07/13/2017    AST 27 07/13/2017     (L) 07/13/2017    K 4.2 07/13/2017     07/13/2017    CREATININE 1.3 07/13/2017    ESTGFRAFRICA >60 07/13/2017    EGFRNONAA 59 (A) 07/13/2017    BUN 20 07/13/2017    CO2 23 07/13/2017    TSH 1.111 07/07/2017    PSA 0.83 01/18/2013    INR 1.0 03/15/2017     (H) 07/13/2017       Lab Results   Component Value Date    HGBA1C 9.6 (H) 06/16/2017    HGBA1C 8.8 (H) 03/16/2017    HGBA1C 11.7 (H) 12/07/2016       Lab Results   Component Value Date    GLUTAMICACID 0.00 12/07/2016    CPEPTIDE 5.1 12/07/2016     Lab Results   Component Value Date    TSH 1.111 07/07/2017     Lab Results   Component Value Date    TOTALTESTOST 182 (L) 01/18/2013     Lab Results   Component Value Date    TESTOSTERONE 213 (L) 12/04/2014    TESTOSTERONE 35.9 (L) 12/04/2014     Lab Results   Component Value Date    FERRITIN 505 (H) 01/12/2017     Lab Results   Component Value Date    PTH 95.0 (H) 12/04/2014    CALCIUM 8.6 (L) 07/13/2017    PHOS 2.6 (L) 11/16/2016       Review of patient's allergies indicates:  No Known Allergies    Past Medical History:   Diagnosis Date    Arthritis     Asthma     Depression     Diabetes mellitus     Diabetes mellitus, type 2 2000    Elevated PSA      Hypertension        Review of Systems   Constitutional: Negative.  Negative for activity change, appetite change, chills, diaphoresis, fatigue, fever and unexpected weight change.   HENT: Negative.  Negative for dental problem, facial swelling, hearing loss, nosebleeds, trouble swallowing and voice change.    Eyes: Negative.  Negative for photophobia, pain, discharge, redness, itching and visual disturbance.   Respiratory: Negative.  Negative for cough, choking, chest tightness, shortness of breath and wheezing.    Cardiovascular: Negative.  Negative for chest pain, palpitations and leg swelling.   Gastrointestinal: Negative.  Negative for abdominal distention, abdominal pain, blood in stool, constipation, diarrhea, nausea and vomiting.   Endocrine: Negative.  Negative for cold intolerance, heat intolerance, polydipsia, polyphagia and polyuria.   Genitourinary: Negative.  Negative for decreased urine volume, difficulty urinating, dysuria, frequency, scrotal swelling, testicular pain and urgency.   Musculoskeletal: Negative.  Negative for arthralgias, back pain, gait problem, joint swelling, myalgias, neck pain and neck stiffness.   Skin: Negative.  Negative for color change, pallor, rash and wound.   Allergic/Immunologic: Negative.  Negative for environmental allergies, food allergies and immunocompromised state.   Neurological: Negative.  Negative for dizziness, tremors, seizures, syncope, facial asymmetry, speech difficulty, weakness, light-headedness, numbness and headaches.   Hematological: Negative.  Negative for adenopathy. Does not bruise/bleed easily.   Psychiatric/Behavioral: Negative.  Negative for agitation, behavioral problems, confusion, decreased concentration, dysphoric mood, hallucinations, self-injury, sleep disturbance and suicidal ideas. The patient is not nervous/anxious and is not hyperactive.       Objective:     Vitals - 1 value per visit 7/12/2017 7/13/2017 7/20/2017   SYSTOLIC - 114 106  "  DIASTOLIC - 70 68   PULSE - 87 -   TEMPERATURE - 97.4 -   RESPIRATIONS - 20 -   SPO2 - 96 -   Weight (lb) 199.3 - 207.23   Weight (kg) 90.4 - 94   HEIGHT 5' 6" - 5' 5"   BODY MASS INDEX 32.17 - 34.49   VISIT REPORT - - -   Pain Score  - - 0   Some recent data might be hidden       Physical Exam   Constitutional: He is oriented to person, place, and time. He appears well-developed and well-nourished. He is cooperative.  Non-toxic appearance. He does not have a sickly appearance. He does not appear ill. No distress. He is not intubated.   HENT:   Head: Normocephalic and atraumatic. Not macrocephalic and not microcephalic. Head is without raccoon's eyes, without Summers's sign, without abrasion, without contusion, without laceration, without right periorbital erythema and without left periorbital erythema. Hair is normal.   Right Ear: External ear normal. No lacerations. No drainage, swelling or tenderness. No foreign bodies. No mastoid tenderness. Tympanic membrane is not injected, not scarred, not perforated, not erythematous, not retracted and not bulging. Tympanic membrane mobility is normal. No middle ear effusion. No hemotympanum. No decreased hearing is noted.   Left Ear: External ear normal. No lacerations. No drainage, swelling or tenderness. No foreign bodies. No mastoid tenderness. Tympanic membrane is not injected, not scarred, not perforated, not erythematous, not retracted and not bulging. Tympanic membrane mobility is normal.  No middle ear effusion. No hemotympanum. No decreased hearing is noted.   Nose: Nose normal.   Mouth/Throat: Oropharynx is clear and moist.   Eyes: EOM and lids are normal. Pupils are equal, round, and reactive to light. Right eye exhibits no chemosis, no discharge, no exudate and no hordeolum. No foreign body present in the right eye. Left eye exhibits no chemosis, no discharge, no exudate and no hordeolum. No foreign body present in the left eye. Right conjunctiva is not " injected. Right conjunctiva has no hemorrhage. Left conjunctiva is not injected. Left conjunctiva has no hemorrhage. No scleral icterus. Right eye exhibits normal extraocular motion and no nystagmus. Left eye exhibits normal extraocular motion and no nystagmus. Right pupil is round and reactive. Left pupil is round and reactive. Pupils are equal.   Neck: Normal range of motion, full passive range of motion without pain and phonation normal. Neck supple. Normal carotid pulses, no hepatojugular reflux and no JVD present. No tracheal tenderness, no spinous process tenderness and no muscular tenderness present. Carotid bruit is not present. No neck rigidity. No tracheal deviation, no edema, no erythema and normal range of motion present. No thyroid mass and no thyromegaly present.   Cardiovascular: Normal rate, regular rhythm, normal heart sounds and intact distal pulses.   No extrasystoles are present. PMI is not displaced.  Exam reveals no gallop, no friction rub and no decreased pulses.    No murmur heard.  Pulses:       Carotid pulses are 2+ on the right side, and 2+ on the left side.       Radial pulses are 2+ on the right side, and 2+ on the left side.        Dorsalis pedis pulses are 2+ on the right side, and 2+ on the left side.        Posterior tibial pulses are 2+ on the right side, and 2+ on the left side.   Pulmonary/Chest: Effort normal and breath sounds normal. No accessory muscle usage or stridor. No apnea, no tachypnea and no bradypnea. He is not intubated. No respiratory distress. He has no decreased breath sounds. He has no wheezes. He has no rhonchi. He has no rales. He exhibits no tenderness.   Abdominal: Soft. Bowel sounds are normal. He exhibits no shifting dullness, no distension, no pulsatile liver, no fluid wave, no abdominal bruit, no ascites, no pulsatile midline mass and no mass. There is no hepatosplenomegaly, splenomegaly or hepatomegaly. There is no tenderness. There is no rigidity, no  rebound, no guarding, no CVA tenderness and no tenderness at McBurney's point. No hernia. Hernia confirmed negative in the ventral area.   Musculoskeletal: Normal range of motion. He exhibits no edema or tenderness.        Right foot: There is normal range of motion and no deformity.        Left foot: There is normal range of motion and no deformity.   Feet:   Right Foot:   Protective Sensation: 6 sites tested. 6 sites sensed.   Skin Integrity: Negative for ulcer, blister, skin breakdown, erythema, warmth, callus or dry skin.   Left Foot:   Protective Sensation: 6 sites tested. 6 sites sensed.   Skin Integrity: Negative for ulcer, blister, skin breakdown, erythema, warmth, callus or dry skin.   Lymphadenopathy:        Head (right side): No submental, no submandibular, no tonsillar, no preauricular, no posterior auricular and no occipital adenopathy present.        Head (left side): No submental, no submandibular, no tonsillar, no preauricular, no posterior auricular and no occipital adenopathy present.     He has no cervical adenopathy.        Right cervical: No superficial cervical, no deep cervical and no posterior cervical adenopathy present.       Left cervical: No superficial cervical, no deep cervical and no posterior cervical adenopathy present.   Neurological: He is alert and oriented to person, place, and time. He has normal reflexes. He displays no atrophy and no tremor. No cranial nerve deficit or sensory deficit. He exhibits normal muscle tone. He displays no seizure activity. Coordination and gait normal.   Reflex Scores:       Bicep reflexes are 2+ on the right side and 2+ on the left side.       Brachioradialis reflexes are 2+ on the right side and 2+ on the left side.       Patellar reflexes are 2+ on the right side and 2+ on the left side.  Skin: Skin is warm and dry. No rash noted. No erythema. No pallor.   Psychiatric: He has a normal mood and affect. His mood appears not anxious. His affect is  not angry, not blunt, not labile and not inappropriate. His speech is not rapid and/or pressured, not delayed, not tangential and not slurred. He is not agitated, not aggressive, not hyperactive, not slowed, not withdrawn, not actively hallucinating and not combative. Thought content is not paranoid and not delusional. Cognition and memory are not impaired. He does not express impulsivity or inappropriate judgment. He does not exhibit a depressed mood. He expresses no homicidal and no suicidal ideation. He expresses no suicidal plans and no homicidal plans. He is communicative. He exhibits normal recent memory and normal remote memory. He is attentive.     Assessment:     1. 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 He has elected the following treatments.     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 If on MDI will also need to test pre-meal blood sugar. Reminded to bring BG meter or record to each visit for review.  2.) Reviewed  "pathophysiology of type 2 diabetes, complications related to the disease, importance of annual dilated eye exam and self daily foot examination.  3.) Continue medications as prescribed MDI with Lantus and Novolog. [facility] MyChart or Phone review in 1 week with BG records for adjustment of medication.  4.) Dietician/CDE evaluation for ongoing meal planning, carbohydrate counting, and diabetic education.  5.) Discussed activity, benefits, methods, and precautions. Recommended patient start/continue some form of exercise and increase as tolerated to 60 minutes per day to facilitate weight loss and aid in control of BGs. Also reminded patient of WHO recommendation of 10,000 steps daily as a goal.   6.) A1C, TSH, Lipid Panel, CMP/Renal panel with eGFR and Micro/Creatinine.  7.) Return to clinic in 6 weeks for follow up. Advised patient to call clinic with any questions or concerns.      I have reviewed your results and they are still quite high. I would like you to adjust your "Treating to Target". The treatment will be Insulin and your target will be the Fasting and 2 hour post meal blood sugar. It will work in this manner;    1. Goal for Fasting blood sugar is  mg/dl. I realize that you will need time to adjust to the new levels and presently you may feel too low if you are too aggressive now. So go slow and aim to lower your blood sugar to below 200 then 150 then 100 over several months.    2. Goal for 2 hour post meal blood sugar is below 180 mg/dl, here the same rules apply as in #1.    3. You will check your fasting blood sugar daily, if not where we would like it to be over a 3 day period then that evening we will increase the Lantus dose by 5 units. Then repeat the process over the next 3 days. Remember this is a slow process and take our time getting to goal. But, each week should be better than prior weeks. Blood sugars below 70 are unacceptable and should raise a "RED FLAG" where we may have to reduce " "our dose of insulin.    4. You will check your post meal glucose daily as well. However, each day you will check a different meal, (ie. Monday-breakfast; Tuesday- lunch; Wednesday- supper, then repeat). If your post meal glucose is not where we would like, increase pre-meal insulin by 2 units next time. A word of CAUTION: mealtime insulin is dependant on the size and concentration of your meal content. If not consuming a large meal do not take large dose of insulin. Use the reasonable person rule.     5. If you have any questions please do not hesitate to call.    Intensive insulin Therapy with correction factor:    You are on Intensive insulin therapy with Basal and Bolus insulin. Lantus, Levamir or NPH is your Basal insulin and will help maintain your fasting and between meal sugar. Your fast acting or rescue insulin is either Humalog, Novalog or Regular insulin and will control your post meal sugar.     You will Take 50 units of Levemir at 9 pm each night. This will be adjusted up or down depending on your fasting blood sugar before breakfast.    Novolog will follow this pre meal schedule; Correction factor of "2 units per 50 mg/dl" and is based on 30-50 grams of carbohydrates per meal.    If blood sugar is below 70 eat first then check your blood sugar 2 hours later and make correction.  If blood pre-meal sugar is  70 -150 take 20 units of Novolog;  If blood pre-meal sugar is 151-200 take +2 units of Novolog;  If blood pre-meal sugar is 201-250 take +4 units of Novolog;  If blood pre-meal sugar is 251-300 take +6 units of Novolog;  If blood pre-meal sugar is 301-350 take +8 units of Novolog;  If blood pre-meal sugar is 351-400+ take +10 units of Novolog;  Also increase water intake and call for appointment.    A total of 40 minutes was spent in face to face time, of which 50 % was spent in counseling patient on disease process, complications, treatment, and side effects of medications.    The patient was " explained the above plan and given opportunity to ask questions. He understands, chooses and consents to this plan and accepts all the risks, which include but are not limited to the risks mentioned above. He understands the alternative of having no testing, interventions or treatments at this time. He left content and without further questions.

## 2017-07-20 NOTE — PATIENT INSTRUCTIONS
" I have reviewed your results and they are still quite high. I would like you to adjust your "Treating to Target". The treatment will be Insulin and your target will be the Fasting and 2 hour post meal blood sugar. It will work in this manner;    1. Goal for Fasting blood sugar is  mg/dl. I realize that you will need time to adjust to the new levels and presently you may feel too low if you are too aggressive now. So go slow and aim to lower your blood sugar to below 200 then 150 then 100 over several months.    2. Goal for 2 hour post meal blood sugar is below 180 mg/dl, here the same rules apply as in #1.    3. You will check your fasting blood sugar daily, if not where we would like it to be over a 3 day period then that evening we will increase the Lantus dose by 5 units. Then repeat the process over the next 3 days. Remember this is a slow process and take our time getting to goal. But, each week should be better than prior weeks. Blood sugars below 70 are unacceptable and should raise a "RED FLAG" where we may have to reduce our dose of insulin.    4. You will check your post meal glucose daily as well. However, each day you will check a different meal, (ie. Monday-breakfast; Tuesday- lunch; Wednesday- supper, then repeat). If your post meal glucose is not where we would like, increase pre-meal insulin by 2 units next time. A word of CAUTION: mealtime insulin is dependant on the size and concentration of your meal content. If not consuming a large meal do not take large dose of insulin. Use the reasonable person rule.     5. If you have any questions please do not hesitate to call.    Intensive insulin Therapy with correction factor:    You are on Intensive insulin therapy with Basal and Bolus insulin. Lantus, Levamir or NPH is your Basal insulin and will help maintain your fasting and between meal sugar. Your fast acting or rescue insulin is either Humalog, Novalog or Regular insulin and will control " "your post meal sugar.     You will Take 50 units of Levemir at 9 pm each night. This will be adjusted up or down depending on your fasting blood sugar before breakfast.    Novolog will follow this pre meal schedule; Correction factor of "2 units per 50 mg/dl" and is based on 30-50 grams of carbohydrates per meal.    If blood sugar is below 70 eat first then check your blood sugar 2 hours later and make correction.  If blood pre-meal sugar is  70 -150 take 20 units of Novolog;  If blood pre-meal sugar is 151-200 take +2 units of Novolog;  If blood pre-meal sugar is 201-250 take +4 units of Novolog;  If blood pre-meal sugar is 251-300 take +6 units of Novolog;  If blood pre-meal sugar is 301-350 take +8 units of Novolog;  If blood pre-meal sugar is 351-400+ take +10 units of Novolog;  Also increase water intake and call for appointment.    "

## 2017-07-20 NOTE — PROGRESS NOTES
Patient is here in clinic today for placement of continuous glucose monitoring system (CGMS) Freestyle [facility]ilar. Site on back of patient's left upper arm was cleaned and sensor was placed and started. No further questions from patient.   Explained to patient that this is a blind procedure and that he will not actually see his BG in real time. Instructed pt. he could shower and informed him of need to check blood sugars as prescribed. Patient was also informed to avoid all imaging procedures and acetaminophen during his procedure. Voiced understanding of all instructions given. Patient will come back on 8/3 to have sensor downloaded.       Continuous glucose monitoring report:     The patient's CGM was downloaded and was reviewed.  The report was technically satisfactory and there were 14 days of data reviewed. Target range was  mg/dl Hypoglycemia was below 70 and hyperglycemic was above 180 mg/dl. Patient's daily glucose summary showed a range of hypoglycemic from 0% to 21% with 2 of 14 days above 10%. His daily above target range was from 0% to 100% 14 of 14 days above 46%.  There was not a food intake diary or exercise diary submitted with this report.     Statistics:  Patient's average glucose was 229 mg/dL, Sensor usage was 14 of 14 days, number of calibration's per day was 0.  He was in amparo above range (TAR) 69% of the time, in time in range(TIR) 29% of the time, and in time below range (TBR) 2% of the time.  The target range for this patient was  mg/dL, and for the purpose of overnight this would be interpreted as 10 PM to 6 AM.     Pattern insight summary:  Nighttime lows, 0 were found.     Daytime lows, 2 were found and most significant pattern showed them to be between 6 PM      Eag2ehxk highs 11 was found and most significant pattern showed them to be between 10 PM- 6 AM     Daytime highs, 14 were found and most significant pattern was detected between 6 AM and 10 PM.        Interpretation:  #1  His hypoglycemia pattern was very mild and etiology was indeterminate.   #2 His daytime highs were due to suboptimal insulin dosing and post meal glucose excursion and he was advised to intensify his insulin dosing. Reduce his meal carbohydrates and portion sizes. Will consider insulin sensitizer to reduce metabolic weight gain.  #3 Will have her follow up in 6 weeks.

## 2017-07-21 LAB
ESTIMATED AVG GLUCOSE: 229 MG/DL
HBA1C MFR BLD HPLC: 9.6 %

## 2017-07-24 ENCOUNTER — PATIENT OUTREACH (OUTPATIENT)
Dept: ADMINISTRATIVE | Facility: HOSPITAL | Age: 62
End: 2017-07-24

## 2017-07-24 LAB — GAD65 AB SER-SCNC: 0.01 NMOL/L

## 2017-07-25 ENCOUNTER — OFFICE VISIT (OUTPATIENT)
Dept: INTERNAL MEDICINE | Facility: CLINIC | Age: 62
End: 2017-07-25
Payer: MEDICARE

## 2017-07-25 VITALS
HEIGHT: 65 IN | BODY MASS INDEX: 34.63 KG/M2 | DIASTOLIC BLOOD PRESSURE: 72 MMHG | WEIGHT: 207.88 LBS | OXYGEN SATURATION: 96 % | RESPIRATION RATE: 20 BRPM | TEMPERATURE: 98 F | HEART RATE: 100 BPM | SYSTOLIC BLOOD PRESSURE: 137 MMHG

## 2017-07-25 DIAGNOSIS — M25.511 CHRONIC RIGHT SHOULDER PAIN: ICD-10-CM

## 2017-07-25 DIAGNOSIS — M54.50 CHRONIC BILATERAL LOW BACK PAIN WITHOUT SCIATICA: ICD-10-CM

## 2017-07-25 DIAGNOSIS — G89.29 CHRONIC RIGHT SHOULDER PAIN: ICD-10-CM

## 2017-07-25 DIAGNOSIS — I50.42 CHRONIC COMBINED SYSTOLIC AND DIASTOLIC CONGESTIVE HEART FAILURE: Primary | ICD-10-CM

## 2017-07-25 DIAGNOSIS — G89.29 CHRONIC BILATERAL LOW BACK PAIN WITHOUT SCIATICA: ICD-10-CM

## 2017-07-25 PROCEDURE — 99999 PR PBB SHADOW E&M-EST. PATIENT-LVL IV: CPT | Mod: PBBFAC,,, | Performed by: FAMILY MEDICINE

## 2017-07-25 PROCEDURE — 3046F HEMOGLOBIN A1C LEVEL >9.0%: CPT | Mod: S$GLB,,, | Performed by: FAMILY MEDICINE

## 2017-07-25 PROCEDURE — 99214 OFFICE O/P EST MOD 30 MIN: CPT | Mod: S$GLB,,, | Performed by: FAMILY MEDICINE

## 2017-07-25 PROCEDURE — 4010F ACE/ARB THERAPY RXD/TAKEN: CPT | Mod: S$GLB,,, | Performed by: FAMILY MEDICINE

## 2017-07-25 NOTE — PROGRESS NOTES
Subjective:       Patient ID: Crow Green is a 62 y.o. male.    Chief Complaint: Follow-up (hospital chest pains); Back Pain; and Shoulder Pain    HPI  Crow is here today to follow-up from recent hospital admission.  He was admitted for chest pain to rule out ACS.  He had echocardiogram and nuclear stress test during the hospitalization that showed no change in his ejection fractions nor reversible ischemia.  Some medication adjustments were made and he has been compliant with his medication regimen although he does feel somewhat overwhelmed with all of his medications.  Says that someone from case management will be coming to his house next week to help arrange his medication and help him to go for more safely.  He is currently being assisted by diabetes management and has a monitor on him currently that his recorded blood glucose and he is keeping a journal of what he is eating.  He still not is exercising as much as he should be but he is going to physical therapy for right hip pain which she says has improved and he is hoping to have some work done with that shoulder also.  Engaged in diabetes education and seems to be learning better things for him to eat.    He has requested a back brace from supply company.    Family History   Problem Relation Age of Onset    Glaucoma Mother     Cataracts Mother     Cataracts Father     Glaucoma Sister     Cataracts Sister     Glaucoma Maternal Aunt     Prostate cancer Neg Hx        Current Outpatient Prescriptions:     amiodarone (PACERONE) 200 MG Tab, Take 1 tablet (200 mg total) by mouth once daily., Disp: 30 tablet, Rfl: 11    amlodipine (NORVASC) 10 MG tablet, Take 1 tablet (10 mg total) by mouth once daily., Disp: 90 tablet, Rfl: 3    apixaban 5 mg Tab, Take 1 tablet (5 mg total) by mouth 2 (two) times daily., Disp: 60 tablet, Rfl: 1    aspirin (ECOTRIN) 81 MG EC tablet, Take 1 tablet (81 mg total) by mouth once daily., Disp: , Rfl: 0     "atorvastatin (LIPITOR) 40 MG tablet, Take 1 tablet (40 mg total) by mouth every evening., Disp: 90 tablet, Rfl: 3    blood sugar diagnostic Strp, 1 strip by Misc.(Non-Drug; Combo Route) route 6 (six) times daily., Disp: 160 strip, Rfl: 11    fluticasone-salmeterol 100-50 mcg/dose (ADVAIR DISKUS) 100-50 mcg/dose diskus inhaler, Inhale 1 puff into the lungs 2 (two) times daily. Controller, Disp: 180 each, Rfl: 3    gabapentin (NEURONTIN) 300 MG capsule, Take 1 capsule (300 mg total) by mouth 3 (three) times daily., Disp: 270 capsule, Rfl: 3    insulin glargine (LANTUS SOLOSTAR) 100 unit/mL (3 mL) InPn pen, Inject 50 Units into the skin every evening., Disp: 2 Box, Rfl: 11    insulin syringe-needle U-100 1 mL 31 gauge x 5/16 Syrg, , Disp: , Rfl:     isosorbide mononitrate (IMDUR) 30 MG 24 hr tablet, Take 1 tablet (30 mg total) by mouth once daily., Disp: 30 tablet, Rfl: 0    latanoprost 0.005 % ophthalmic solution, Place 1 drop into both eyes every evening., Disp: 1 Bottle, Rfl: 4    lisinopril (PRINIVIL,ZESTRIL) 5 MG tablet, Take 1 tablet (5 mg total) by mouth once daily., Disp: 90 tablet, Rfl: 2    metformin (GLUCOPHAGE) 1000 MG tablet, Take 1 tablet (1,000 mg total) by mouth 2 (two) times daily with meals., Disp: 180 tablet, Rfl: 3    metoprolol succinate (TOPROL-XL) 50 MG 24 hr tablet, TAKE 1 TABLET EVERY DAY, Disp: 30 tablet, Rfl: 5    nitroGLYCERIN (NITROSTAT) 0.3 MG SL tablet, Place 1 tablet (0.3 mg total) under the tongue every 5 (five) minutes as needed for Chest pain. No more than 3 tablets in one day., Disp: 30 tablet, Rfl: 1    NOVOLOG FLEXPEN 100 unit/mL InPn pen, ADMINISTER 20 UNITS UNDER THE SKIN THREE TIMES DAILY WITH MEALS, Disp: 15 mL, Rfl: 0    pantoprazole (PROTONIX) 40 MG tablet, Take 1 tablet (40 mg total) by mouth once daily., Disp: 30 tablet, Rfl: 11    pen needle, diabetic (BD INSULIN PEN NEEDLE UF SHORT) 31 gauge x 5/16" Ndle, Inject 1 each into the skin 3 (three) times daily., " "Disp: 90 each, Rfl: 11    sertraline (ZOLOFT) 50 MG tablet, Take 1 tablet (50 mg total) by mouth once daily., Disp: 90 tablet, Rfl: 3    tiotropium (SPIRIVA WITH HANDIHALER) 18 mcg inhalation capsule, Inhale 1 capsule (18 mcg total) into the lungs once daily. Controller, Disp: 90 capsule, Rfl: 3    TRUE METRIX AIR GLUCOSE METER kit, TEST FOUR TIMES DAILY BEFORE MEALS  AND EVERY NIGHT, Disp: 1 each, Rfl: 0    VENTOLIN HFA 90 mcg/actuation inhaler, , Disp: , Rfl:     Review of Systems   Constitutional: Negative for chills and fever.   Eyes: Negative for visual disturbance.   Respiratory: Negative for cough and shortness of breath.    Cardiovascular: Negative for chest pain.   Gastrointestinal: Negative for abdominal pain.   Musculoskeletal: Positive for arthralgias and back pain.   Neurological: Negative for dizziness.       Objective:   /72 (BP Location: Left arm, Patient Position: Sitting, BP Method: Automatic)   Pulse 100   Temp 97.6 °F (36.4 °C) (Tympanic)   Resp 20   Ht 5' 5" (1.651 m)   Wt 94.3 kg (207 lb 14.3 oz)   SpO2 96%   BMI 34.60 kg/m²      Physical Exam   Constitutional: He is oriented to person, place, and time. He appears well-developed and well-nourished.   HENT:   Head: Normocephalic and atraumatic.   Eyes: Conjunctivae are normal.   Cardiovascular: Normal rate.    Pulmonary/Chest: Effort normal. No respiratory distress.   Musculoskeletal: He exhibits no edema.   Neurological: He is alert and oriented to person, place, and time. Coordination normal.   Skin: Skin is warm and dry. No rash noted.   Psychiatric: He has a normal mood and affect. His behavior is normal.   Vitals reviewed.      Assessment & Plan     1. Chronic combined systolic and diastolic congestive heart failure  Symptomatology and studies are stable.  Recommended that he follow-up with cardiology intermittently to assist with maintenance of medication.  - Ambulatory Referral to Cardiology    2. 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    3. Chronic right shoulder pain  - Ambulatory consult to Physical Therapy    4. Chronic bilateral low back pain without sciatica  Emphasized the importance of losing weight and how this will help with his low back pain but may consider getting a back brace in the future just to help give him some additional support.

## 2017-07-27 ENCOUNTER — NUTRITION (OUTPATIENT)
Dept: DIABETES | Facility: CLINIC | Age: 62
End: 2017-07-27
Payer: MEDICARE

## 2017-07-27 VITALS — HEIGHT: 65 IN | WEIGHT: 210.31 LBS | BODY MASS INDEX: 35.04 KG/M2

## 2017-07-27 DIAGNOSIS — E11.3299 TYPE 2 DIABETES MELLITUS WITH MILD NONPROLIFERATIVE DIABETIC RETINOPATHY WITHOUT MACULAR EDEMA, UNSPECIFIED EYE: Primary | ICD-10-CM

## 2017-07-27 PROCEDURE — G0108 DIAB MANAGE TRN  PER INDIV: HCPCS | Mod: S$GLB,,, | Performed by: DIETITIAN, REGISTERED

## 2017-07-27 PROCEDURE — 99999 PR PBB SHADOW E&M-EST. PATIENT-LVL IV: CPT | Mod: PBBFAC,,, | Performed by: DIETITIAN, REGISTERED

## 2017-07-27 NOTE — PROGRESS NOTES
Diabetes Education  Author: Carmela Hutchison RD, CDE  Date: 7/27/2017    Referring Provider: Mateo Lambert,   62 y.o. male in clinic today for diabetes education class f/u. T2DM for over 10 years now. Patient is currently taking Lantus 50 units every evening, Novolog 20 units before meals, metformin 1000 mg BID for diabetes. DM med mgt. Per RAMBO Fishman PA-C. Patient is being followed with 2-week professional CGMS at this time. Mr. Green states he is feeling overwhelmed at this time with SMBG log requirements for CGMS. SMBG 4-6x/day with fasting  this morning (200-300 more typical), AC readings 210-240, -224.     Last A1C:   Hemoglobin A1C   Date Value Ref Range Status   07/20/2017 9.6 (H) 4.0 - 5.6 % Final     Comment:     According to ADA guidelines, hemoglobin A1c <7.0% represents  optimal control in non-pregnant diabetic patients. Different  metrics may apply to specific patient populations.   Standards of Medical Care in Diabetes-2016.  For the purpose of screening for the presence of diabetes:  <5.7%     Consistent with the absence of diabetes  5.7-6.4%  Consistent with increasing risk for diabetes   (prediabetes)  >or=6.5%  Consistent with diabetes  Currently, no consensus exists for use of hemoglobin A1c  for diagnosis of diabetes for children.  This Hemoglobin A1c assay has significant interference with fetal   hemoglobin   (HbF). The results are invalid for patients with abnormal amounts of   HbF,   including those with known Hereditary Persistence   of Fetal Hemoglobin. Heterozygous hemoglobin variants (HbAS, HbAC,   HbAD, HbAE, HbA2) do not significantly interfere with this assay;   however, presence of multiple variants in a sample may impact the %   interference.       Diabetes Education Visit  Diabetes Education Record Assessment/Progress: Post Program/Follow-up    Diabetes Type  Diabetes Type : Type II    Diabetes History  Diabetes Diagnosis: >10 years    Monitoring   Self  Monitoring : 4-6x/day; fasting  this AM (200-300 more typical); ac readings 210-240; HS range 190-224  Blood Glucose Logs: No    Exercise   Frequency: Never    Current Diabetes Treatment   Current Treatment: Oral Medication, Diet, Insulin    Social History  Preferred Learning Method: Face to Face  Primary Support: Self    Barriers to Change  Barriers to Change: None  Learning Challenges : Vision  Vision - further explanation: visually impaired    Readiness to Learn   Readiness to Learn : Acceptance    Cultural Influences  Cultural Influences: No    Diabetes Education Assessment/Progress  Nutrition (Incorporating nutritional management into one's lifestyle): Discussion, Competent (verbalizes/demonstrates)  Physical Activity (incorporating physical activity into one's lifestyle): Competent (verbalizes/demonstrates), Discussion  Medications (states correct name, dose, onset, peak, duration, side effects & timing of meds): Discussion, Competent (verbalizes/demonstrates)  Monitoring (monitoring blood glucose/other parameters & using results): Discussion, Competent (verbalizes/demonstrates)  Goal Setting and Problem Solving (verbalizes behavior change strategies & sets realistic goals): Competent (verbalizes/demonstrates), Discussion  Behavior Change (developing personal strategies to health & behavior change): Discussion, Comnpetent (verbalizes/demonstrates)  Psychosocial Issues (developing personal srategies to address psychosocial concerns): Competent (verbalizes/demonstrates), Discussion    Goals  Monitoring: % Met  Met Percentage : 100% (Patient has been monitoring BG 4-6 x/day with records avail.)    Diabetes Care Plan/Intervention  Education Plan/Intervention: Individual Follow-Up DSMT  4 month recall with CDE/RD for cont mgt/support. Sample log filled out with patient for return demonstration. Patient will f/u with DM DARSHANA after completion of 2-wk CGMS.    Education Units of Time   Time Spent: 30  min      Health Maintenance Topics with due status: Not Due       Topic Last Completion Date    Influenza Vaccine 11/18/2016    Pneumococcal PPSV23 (Medium Risk) 12/13/2016    Eye Exam 06/13/2017    Lipid Panel 07/07/2017    Foot Exam 07/20/2017    Hemoglobin A1c 07/20/2017     Health Maintenance Due   Topic Date Due    TETANUS VACCINE  06/20/1975    Colonoscopy  06/20/2007    Zoster Vaccine  06/20/2017

## 2017-07-27 NOTE — LETTER
July 27, 2017      [place]  [id number] [address] [address]         Patient: Crow Green   MR Number: [id number]   YOB: 1955   Date of Visit: 7/27/2017       Dear Dr. Lambert:    Thank you for referring Crow for diabetes self-management education and support. He has completed all components of our Diabetes Management Program and his Self-Management Support Plan. Below is a summary of his clinical outcomes and goal progress.    Patient Outcomes:    A1c Status:   Lab Results   Component Value Date    HGBA1C 9.6 (H) 07/20/2017    HGBA1C 9.6 (H) 06/16/2017     Goals  Monitoring: % Met  Met Percentage : 100% (Patient has been monitoring BG 4-6 x/day with records avail.)         Follow up: Currently being monitored via Continuous Glucose Monitoring System x 14 days. Patient will f/u with diabetes DARSHANA after completed.    · Crow to follow diabetes support plan indicated above  · Crow to attend medical appointments as scheduled  · Crow to update you on his DM education progress as needed      If you have questions, please do not hesitate to call me. I look forward to providing additional education and support as needed.    Sincerely,    Carmela Hutchison RD, CDE

## 2017-07-28 ENCOUNTER — OFFICE VISIT (OUTPATIENT)
Dept: PODIATRY | Facility: CLINIC | Age: 62
End: 2017-07-28
Payer: MEDICARE

## 2017-07-28 ENCOUNTER — TELEPHONE (OUTPATIENT)
Dept: DIABETES | Facility: CLINIC | Age: 62
End: 2017-07-28

## 2017-07-28 VITALS
HEART RATE: 91 BPM | SYSTOLIC BLOOD PRESSURE: 123 MMHG | HEIGHT: 65 IN | BODY MASS INDEX: 35.37 KG/M2 | WEIGHT: 212.31 LBS | DIASTOLIC BLOOD PRESSURE: 74 MMHG

## 2017-07-28 DIAGNOSIS — E11.49 TYPE II DIABETES MELLITUS WITH NEUROLOGICAL MANIFESTATIONS: ICD-10-CM

## 2017-07-28 DIAGNOSIS — L85.3 XEROSIS OF SKIN: Primary | ICD-10-CM

## 2017-07-28 DIAGNOSIS — B35.1 DERMATOPHYTOSIS OF NAIL: ICD-10-CM

## 2017-07-28 PROCEDURE — 3046F HEMOGLOBIN A1C LEVEL >9.0%: CPT | Mod: S$GLB,,, | Performed by: PODIATRIST

## 2017-07-28 PROCEDURE — 99212 OFFICE O/P EST SF 10 MIN: CPT | Mod: 25,S$GLB,, | Performed by: PODIATRIST

## 2017-07-28 PROCEDURE — 4010F ACE/ARB THERAPY RXD/TAKEN: CPT | Mod: S$GLB,,, | Performed by: PODIATRIST

## 2017-07-28 PROCEDURE — 11721 DEBRIDE NAIL 6 OR MORE: CPT | Mod: Q9,S$GLB,, | Performed by: PODIATRIST

## 2017-07-28 PROCEDURE — 99999 PR PBB SHADOW E&M-EST. PATIENT-LVL III: CPT | Mod: PBBFAC,,, | Performed by: PODIATRIST

## 2017-07-28 RX ORDER — AMMONIUM LACTATE 12 G/100G
1 CREAM TOPICAL 2 TIMES DAILY
Qty: 1 TUBE | Refills: 3 | Status: SHIPPED | OUTPATIENT
Start: 2017-07-28 | End: 2019-06-27

## 2017-07-28 NOTE — PROGRESS NOTES
Subjective:     Patient ID: Crow Green is a 62 y.o. male.    Chief Complaint: Routine Foot Care (PCP: Ricky Lambert 7/2017, diabetic nail care/ feet check, patient ) and Foot Pain (left foot, pain was descibed as tingling, patient rated current pain at a 3)    Crow is a 62 y.o. male who presents to the clinic for evaluation and treatment of high risk feet. Crow has a past medical history of Arthritis; Asthma; Depression; Diabetes mellitus; Diabetes mellitus, type 2 (Diagnosed in 2000); Elevated PSA; and Hypertension. The patient's chief complaint is long, thick toenails. Patient also complains of dry skin not relieved with Gold bond Diabetic lotion.  This patient has documented high risk feet requiring routine maintenance secondary to diabetes mellitis and those secondary complications of diabetes, as mentioned..    PCP: Mateo Lambert, DO    Date Last Seen by PCP: 7/25/17    Current shoe gear:  Affected Foot: Tennis shoes     Unaffected Foot: Tennis shoes    Hemoglobin A1C   Date Value Ref Range Status   07/20/2017 9.6 (H) 4.0 - 5.6 % Final     Comment:     According to ADA guidelines, hemoglobin A1c <7.0% represents  optimal control in non-pregnant diabetic patients. Different  metrics may apply to specific patient populations.   Standards of Medical Care in Diabetes-2016.  For the purpose of screening for the presence of diabetes:  <5.7%     Consistent with the absence of diabetes  5.7-6.4%  Consistent with increasing risk for diabetes   (prediabetes)  >or=6.5%  Consistent with diabetes  Currently, no consensus exists for use of hemoglobin A1c  for diagnosis of diabetes for children.  This Hemoglobin A1c assay has significant interference with fetal   hemoglobin   (HbF). The results are invalid for patients with abnormal amounts of   HbF,   including those with known Hereditary Persistence   of Fetal Hemoglobin. Heterozygous hemoglobin variants (HbAS, HbAC,   HbAD, HbAE, HbA2) do not significantly  interfere with this assay;   however, presence of multiple variants in a sample may impact the %   interference.     06/16/2017 9.6 (H) 4.0 - 5.6 % Final     Comment:     According to ADA guidelines, hemoglobin A1c <7.0% represents  optimal control in non-pregnant diabetic patients. Different  metrics may apply to specific patient populations.   Standards of Medical Care in Diabetes-2016.  For the purpose of screening for the presence of diabetes:  <5.7%     Consistent with the absence of diabetes  5.7-6.4%  Consistent with increasing risk for diabetes   (prediabetes)  >or=6.5%  Consistent with diabetes  Currently, no consensus exists for use of hemoglobin A1c  for diagnosis of diabetes for children.  This Hemoglobin A1c assay has significant interference with fetal   hemoglobin   (HbF). The results are invalid for patients with abnormal amounts of   HbF,   including those with known Hereditary Persistence   of Fetal Hemoglobin. Heterozygous hemoglobin variants (HbAS, HbAC,   HbAD, HbAE, HbA2) do not significantly interfere with this assay;   however, presence of multiple variants in a sample may impact the %   interference.     03/16/2017 8.8 (H) 4.5 - 6.2 % Final     Comment:     According to ADA guidelines, hemoglobin A1C <7.0% represents  optimal control in non-pregnant diabetic patients.  Different  metrics may apply to specific populations.   Standards of Medical Care in Diabetes - 2016.  For the purpose of screening for the presence of diabetes:  <5.7%     Consistent with the absence of diabetes  5.7-6.4%  Consistent with increasing risk for diabetes   (prediabetes)  >or=6.5%  Consistent with diabetes  Currently no consensus exists for use of hemoglobin A1C  for diagnosis of diabetes for children.             Patient Active Problem List   Diagnosis    ED (erectile dysfunction)    Testicular hypogonadism    Non-proliferative diabetic retinopathy, mild, both eyes    Hypertension    Diabetic polyneuropathy     Abnormal nuclear stress test    Coronary artery disease involving native coronary artery without angina pectoris    Chronic combined systolic and diastolic congestive heart failure    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, with long-term current use of insulin    SANDRITA on CPAP    Asthma    Delayed sleep phase syndrome    Psychophysiological insomnia    Nightmares    Sleep related gastroesophageal reflux disease    Inadequate sleep hygiene    PAF (paroxysmal atrial fibrillation)    At risk for medication noncompliance    Piriformis muscle pain    Obesity (BMI 30.0-34.9)    Indeterminate stage chronic open angle glaucoma    Atypical chest pain    Elevated troponin    Right hip pain       Medication List with Changes/Refills   New Medications    AMMONIUM LACTATE 12 % CREA    Apply 1 Act topically 2 (two) times daily.   Current Medications    AMIODARONE (PACERONE) 200 MG TAB    Take 1 tablet (200 mg total) by mouth once daily.    AMLODIPINE (NORVASC) 10 MG TABLET    Take 1 tablet (10 mg total) by mouth once daily.    APIXABAN 5 MG TAB    Take 1 tablet (5 mg total) by mouth 2 (two) times daily.    ASPIRIN (ECOTRIN) 81 MG EC TABLET    Take 1 tablet (81 mg total) by mouth once daily.    ATORVASTATIN (LIPITOR) 40 MG TABLET    Take 1 tablet (40 mg total) by mouth every evening.    BLOOD SUGAR DIAGNOSTIC STRP    1 strip by Misc.(Non-Drug; Combo Route) route 6 (six) times daily.    FLUTICASONE-SALMETEROL 100-50 MCG/DOSE (ADVAIR DISKUS) 100-50 MCG/DOSE DISKUS INHALER    Inhale 1 puff into the lungs 2 (two) times daily. Controller    GABAPENTIN (NEURONTIN) 300 MG CAPSULE    Take 1 capsule (300 mg total) by mouth 3 (three) times daily.    INSULIN GLARGINE (LANTUS SOLOSTAR) 100 UNIT/ML (3 ML) INPN PEN    Inject 50 Units into the skin every evening.    INSULIN SYRINGE-NEEDLE U-100 1 ML 31 GAUGE X 5/16 SYRG        ISOSORBIDE MONONITRATE (IMDUR) 30 MG 24  "HR TABLET    Take 1 tablet (30 mg total) by mouth once daily.    LATANOPROST 0.005 % OPHTHALMIC SOLUTION    Place 1 drop into both eyes every evening.    LISINOPRIL (PRINIVIL,ZESTRIL) 5 MG TABLET    Take 1 tablet (5 mg total) by mouth once daily.    METFORMIN (GLUCOPHAGE) 1000 MG TABLET    Take 1 tablet (1,000 mg total) by mouth 2 (two) times daily with meals.    METOPROLOL SUCCINATE (TOPROL-XL) 50 MG 24 HR TABLET    TAKE 1 TABLET EVERY DAY    NITROGLYCERIN (NITROSTAT) 0.3 MG SL TABLET    Place 1 tablet (0.3 mg total) under the tongue every 5 (five) minutes as needed for Chest pain. No more than 3 tablets in one day.    NOVOLOG FLEXPEN 100 UNIT/ML INPN PEN    ADMINISTER 20 UNITS UNDER THE SKIN THREE TIMES DAILY WITH MEALS    PANTOPRAZOLE (PROTONIX) 40 MG TABLET    Take 1 tablet (40 mg total) by mouth once daily.    PEN NEEDLE, DIABETIC (BD INSULIN PEN NEEDLE UF SHORT) 31 GAUGE X 5/16" NDLE    Inject 1 each into the skin 3 (three) times daily.    SERTRALINE (ZOLOFT) 50 MG TABLET    Take 1 tablet (50 mg total) by mouth once daily.    TIOTROPIUM (SPIRIVA WITH HANDIHALER) 18 MCG INHALATION CAPSULE    Inhale 1 capsule (18 mcg total) into the lungs once daily. Controller    TRUE METRIX AIR GLUCOSE METER KIT    TEST FOUR TIMES DAILY BEFORE MEALS  AND EVERY NIGHT    VENTOLIN HFA 90 MCG/ACTUATION INHALER           Review of patient's allergies indicates:  No Known Allergies    History reviewed. No pertinent surgical history.    Family History   Problem Relation Age of Onset    Glaucoma Mother     Cataracts Mother     Cataracts Father     Glaucoma Sister     Cataracts Sister     Glaucoma Maternal Aunt     Prostate cancer Neg Hx        Social History     Social History    Marital status: Legally      Spouse name: N/A    Number of children: 5    Years of education: N/A     Occupational History    Not on file.     Social History Main Topics    Smoking status: Never Smoker    Smokeless tobacco: Never Used " "   Alcohol use No    Drug use: No    Sexual activity: Not Currently     Other Topics Concern    Not on file     Social History Narrative    No narrative on file       Vitals:    07/28/17 0917   BP: 123/74   Pulse: 91   Weight: 96.3 kg (212 lb 4.9 oz)   Height: 5' 5" (1.651 m)   PainSc:   3       Hemoglobin A1C   Date Value Ref Range Status   07/20/2017 9.6 (H) 4.0 - 5.6 % Final     Comment:     According to ADA guidelines, hemoglobin A1c <7.0% represents  optimal control in non-pregnant diabetic patients. Different  metrics may apply to specific patient populations.   Standards of Medical Care in Diabetes-2016.  For the purpose of screening for the presence of diabetes:  <5.7%     Consistent with the absence of diabetes  5.7-6.4%  Consistent with increasing risk for diabetes   (prediabetes)  >or=6.5%  Consistent with diabetes  Currently, no consensus exists for use of hemoglobin A1c  for diagnosis of diabetes for children.  This Hemoglobin A1c assay has significant interference with fetal   hemoglobin   (HbF). The results are invalid for patients with abnormal amounts of   HbF,   including those with known Hereditary Persistence   of Fetal Hemoglobin. Heterozygous hemoglobin variants (HbAS, HbAC,   HbAD, HbAE, HbA2) do not significantly interfere with this assay;   however, presence of multiple variants in a sample may impact the %   interference.     06/16/2017 9.6 (H) 4.0 - 5.6 % Final     Comment:     According to ADA guidelines, hemoglobin A1c <7.0% represents  optimal control in non-pregnant diabetic patients. Different  metrics may apply to specific patient populations.   Standards of Medical Care in Diabetes-2016.  For the purpose of screening for the presence of diabetes:  <5.7%     Consistent with the absence of diabetes  5.7-6.4%  Consistent with increasing risk for diabetes   (prediabetes)  >or=6.5%  Consistent with diabetes  Currently, no consensus exists for use of hemoglobin A1c  for diagnosis of " diabetes for children.  This Hemoglobin A1c assay has significant interference with fetal   hemoglobin   (HbF). The results are invalid for patients with abnormal amounts of   HbF,   including those with known Hereditary Persistence   of Fetal Hemoglobin. Heterozygous hemoglobin variants (HbAS, HbAC,   HbAD, HbAE, HbA2) do not significantly interfere with this assay;   however, presence of multiple variants in a sample may impact the %   interference.     03/16/2017 8.8 (H) 4.5 - 6.2 % Final     Comment:     According to ADA guidelines, hemoglobin A1C <7.0% represents  optimal control in non-pregnant diabetic patients.  Different  metrics may apply to specific populations.   Standards of Medical Care in Diabetes - 2016.  For the purpose of screening for the presence of diabetes:  <5.7%     Consistent with the absence of diabetes  5.7-6.4%  Consistent with increasing risk for diabetes   (prediabetes)  >or=6.5%  Consistent with diabetes  Currently no consensus exists for use of hemoglobin A1C  for diagnosis of diabetes for children.         Review of Systems   Constitutional: Negative for chills and fever.   Respiratory: Negative for shortness of breath.    Cardiovascular: Negative for chest pain, palpitations, orthopnea, claudication and leg swelling.   Gastrointestinal: Negative for diarrhea, nausea and vomiting.   Musculoskeletal: Negative for joint pain.   Skin: Negative for rash.   Neurological: Positive for tingling and sensory change. Negative for dizziness, focal weakness and weakness.   Psychiatric/Behavioral: Negative.          Objective:      PHYSICAL EXAM: Apperance: Alert and orient in no distress,well developed, and with good attention to grooming and body habits  Patient presents ambulating in tennis shoes.   LOWER EXTREMITY EXAM:  VASCULAR: Dorsalis pedis pulses 2/4 bilateral and Posterior Tibial pulses 2/4 bilateral. Capillary fill time <3 seconds bilateral. No edema observed bilateral. Varicosities  absent bilateral. Skin temperature of the lower extremities is warm to warm, proximal to distal. Hair growth dim bilateral.  DERMATOLOGICAL: No skin rashes, subcutaneous nodules, lesions, or ulcers observed bilateral. Nails 1,2,3,4,5 bilateral elongated, thickened, and discolored with subungual debris. Webspaces 1-4 clean, dry and without evidence of break in skin integrity bilateral. Dry and scaly skin noted plantarly bilateral.   NEUROLOGICAL: Light touch, sharp-dull, proprioception all present and equal bilaterally.  Vibratory sensation diminished at bilateral hallux and navicular. Protective sensation absent at sites as tested with a [place]-Kimmie 5.07 monofilament.   MUSCULOSKELETAL: Muscle strength is 5/5 for foot inverters, everters, plantarflexors, and dorsiflexors. Muscle tone is normal.         Assessment:       Encounter Diagnosis   Name Primary?    Xerosis of skin Yes         Plan:   Xerosis of skin  -     ammonium lactate 12 % Crea; Apply 1 Act topically 2 (two) times daily.  Dispense: 1 Tube; Refill: 3      I counseled the patient on his conditions, regarding findings of my examination, my impressions, and usual treatment plan.   Shoe inspection. Diabetic Foot Education. Patient reminded of the importance of good nutrition and blood sugar control to help prevent podiatric complications of diabetes. Patient instructed on proper foot hygeine. We discussed wearing proper shoe gear, daily foot inspections, never walking without protective shoe gear, never putting sharp instruments to feet.    With patient's permission, nails 1-5 bilateral were trimmed in length and thickness aggressively to their soft tissue attachment mechanically and with electric , removing all offending nail and debris. Patient relates relief following the procedure.  Prescription written for Ammonium Lactate 12% cream to be applied to feet twice daily.   Patient  will continue to monitor the areas daily, inspect feet, wear  protective shoe gear when ambulatory, moisturizer to maintain skin integrity. Patient reminded of the importance of good nutrition and blood sugar control to help prevent podiatric complications of diabetes.  Patient to return 3 months or sooner if needed.                 Barb Veras DPM  [facility]

## 2017-07-28 NOTE — TELEPHONE ENCOUNTER
----- Message from Selene Fishman Jr., PA-C sent at 7/28/2017  1:52 PM CDT -----  Please call Mr. Green and see if he is following his insulin directions. Also ask for his recent blood sugar numbers. We may need to intensify his therapy

## 2017-07-28 NOTE — PROGRESS NOTES
Please call Mr. Green and see if he is following his insulin directions. Also ask for his recent blood sugar numbers. We may need to intensify his therapy

## 2017-07-28 NOTE — TELEPHONE ENCOUNTER
Patient stated that he is taking Lantus 50 units every evening, Novolog 20 units before meals, metformin 1000 mg BID    7/28/17  Fasting 276  7/28/17  Pp dinner 310  7/28/17  Fasting 216  7/28/16  116

## 2017-07-31 ENCOUNTER — TELEPHONE (OUTPATIENT)
Dept: DIABETES | Facility: CLINIC | Age: 62
End: 2017-07-31

## 2017-07-31 NOTE — TELEPHONE ENCOUNTER
----- Message from Georgette Mercer sent at 7/31/2017  1:59 PM CDT -----  Contact: pt  Pt needs to reschedule the diabetic appt on 8/3 for a glucose montitoring.  Pt has a class at time.

## 2017-07-31 NOTE — TELEPHONE ENCOUNTER
Patient will not be able to make it to [facility] appt on 8/3 for removal. He will removal sensor on 8/3 and drop off on 8/7.

## 2017-08-07 ENCOUNTER — CLINICAL SUPPORT (OUTPATIENT)
Dept: DIABETES | Facility: CLINIC | Age: 62
End: 2017-08-07
Payer: MEDICARE

## 2017-08-07 PROCEDURE — 95251 CONT GLUC MNTR ANALYSIS I&R: CPT | Mod: S$GLB,,, | Performed by: PHYSICIAN ASSISTANT

## 2017-08-07 NOTE — PROGRESS NOTES
Patient returned to clinic today to return Glucose Sensor, patient removed sensor on 8/03/17, He did not have signed log form used in [facility] procedure, he will bring it tomorrow.    The [facility] Sensor has been scanned and downloaded. All reports have been imported into the patient's electronic medical record.    Endocrine PA-C  will complete data interpretation and make recommendations.    
08-May-2017 08:00

## 2017-08-22 ENCOUNTER — TELEPHONE (OUTPATIENT)
Dept: INTERNAL MEDICINE | Facility: CLINIC | Age: 62
End: 2017-08-22

## 2017-08-22 ENCOUNTER — OFFICE VISIT (OUTPATIENT)
Dept: INTERNAL MEDICINE | Facility: CLINIC | Age: 62
End: 2017-08-22
Payer: MEDICARE

## 2017-08-22 ENCOUNTER — HOSPITAL ENCOUNTER (OUTPATIENT)
Dept: RADIOLOGY | Facility: HOSPITAL | Age: 62
Discharge: HOME | End: 2017-08-22
Attending: FAMILY MEDICINE
Payer: MEDICARE

## 2017-08-22 VITALS
TEMPERATURE: 98 F | WEIGHT: 215.38 LBS | RESPIRATION RATE: 20 BRPM | BODY MASS INDEX: 34.61 KG/M2 | OXYGEN SATURATION: 97 % | HEIGHT: 66 IN | HEART RATE: 83 BPM | DIASTOLIC BLOOD PRESSURE: 77 MMHG | SYSTOLIC BLOOD PRESSURE: 137 MMHG

## 2017-08-22 DIAGNOSIS — R51.9 PERSISTENT HEADACHES: Primary | ICD-10-CM

## 2017-08-22 DIAGNOSIS — R51.9 PERSISTENT HEADACHES: ICD-10-CM

## 2017-08-22 PROCEDURE — 3078F DIAST BP <80 MM HG: CPT | Mod: S$GLB,,, | Performed by: FAMILY MEDICINE

## 2017-08-22 PROCEDURE — 3008F BODY MASS INDEX DOCD: CPT | Mod: S$GLB,,, | Performed by: FAMILY MEDICINE

## 2017-08-22 PROCEDURE — 99214 OFFICE O/P EST MOD 30 MIN: CPT | Mod: S$GLB,,, | Performed by: FAMILY MEDICINE

## 2017-08-22 PROCEDURE — 99999 PR PBB SHADOW E&M-EST. PATIENT-LVL III: CPT | Mod: PBBFAC,,, | Performed by: FAMILY MEDICINE

## 2017-08-22 PROCEDURE — 70551 MRI BRAIN STEM W/O DYE: CPT | Mod: 26,,, | Performed by: RADIOLOGY

## 2017-08-22 PROCEDURE — 3075F SYST BP GE 130 - 139MM HG: CPT | Mod: S$GLB,,, | Performed by: FAMILY MEDICINE

## 2017-08-22 PROCEDURE — 70551 MRI BRAIN STEM W/O DYE: CPT | Mod: TC,PO

## 2017-08-22 RX ORDER — SERTRALINE HYDROCHLORIDE 25 MG/1
TABLET, FILM COATED ORAL
COMMUNITY
Start: 2017-08-03 | End: 2018-01-11

## 2017-08-22 RX ORDER — DULOXETIN HYDROCHLORIDE 30 MG/1
CAPSULE, DELAYED RELEASE ORAL
COMMUNITY
Start: 2017-08-03 | End: 2018-04-25 | Stop reason: DRUGHIGH

## 2017-08-22 RX ORDER — TRAZODONE HYDROCHLORIDE 150 MG/1
TABLET ORAL
COMMUNITY
Start: 2017-08-03 | End: 2018-09-13 | Stop reason: SDUPTHER

## 2017-08-22 NOTE — PROGRESS NOTES
Subjective:       Patient ID: Crow Green is a 62 y.o. male.    Chief Complaint: Hip Pain (Pt currently seeing PT for hip pain. Inder increase form 2 to 3 week. Pt states PT suggest injection for pain. ) and Erectile Dysfunction (Pt c/o of dysfunction for several months. Viagra rx'd in past, pt no longer taking becuase too expensive. )    HPI  Although patient presented with chief complaint of hip pain and arrest dysfunction I went into the room he was visibly having headache and some dizziness.  Within delved into his recent history of having a daily ongoing headache for the last month.  He frequently wakes up with this headache and it does not go away.  Most of the time it is unilateral and he has a burning sensation in his left temporal area and he has frequent dizziness and nausea.  Also stated that he has been having pain and numbness whenever he is chewing food.  These headaches have not been alleviated by anything he has tried although sometimes he says whenever he ate he felt a little bit better.  It has significantly affected his life where he is not able to change positions very well without having symptoms.  In regards to his diabetes, he continues to fluctuate although he has been trying to do well with insulin.  Certainly has issues with eating healthy because he has transportation issues, is on a fixed income and both of these things combined to make it difficult for him to obtain healthy foods.  He has hip pain that he also mentioned.  He is going to physical therapy for this but continues to have issues and this really limits her from being able to exercise and lose weight which would help his diabetes.  He was wondering whether he would be a candidate for intramuscular injection to try to help with some of his hip pain.    Family History   Problem Relation Age of Onset    Glaucoma Mother     Cataracts Mother     Cataracts Father     Glaucoma Sister     Cataracts Sister     Glaucoma  Maternal Aunt     Prostate cancer Neg Hx        Current Outpatient Prescriptions:     ALCOHOL ANTISEPTIC PADS (ALCOHOL PREP PADS TOP), , Disp: , Rfl:     amiodarone (PACERONE) 200 MG Tab, Take 1 tablet (200 mg total) by mouth once daily., Disp: 30 tablet, Rfl: 11    amlodipine (NORVASC) 10 MG tablet, Take 1 tablet (10 mg total) by mouth once daily., Disp: 90 tablet, Rfl: 3    ammonium lactate 12 % Crea, Apply 1 Act topically 2 (two) times daily., Disp: 1 Tube, Rfl: 3    apixaban 5 mg Tab, Take 1 tablet (5 mg total) by mouth 2 (two) times daily., Disp: 60 tablet, Rfl: 1    aspirin (ECOTRIN) 81 MG EC tablet, Take 1 tablet (81 mg total) by mouth once daily., Disp: , Rfl: 0    atorvastatin (LIPITOR) 40 MG tablet, Take 1 tablet (40 mg total) by mouth every evening., Disp: 90 tablet, Rfl: 3    blood sugar diagnostic Strp, 1 strip by Misc.(Non-Drug; Combo Route) route 6 (six) times daily., Disp: 160 strip, Rfl: 11    duloxetine (CYMBALTA) 30 MG capsule, , Disp: , Rfl:     fluticasone-salmeterol 100-50 mcg/dose (ADVAIR DISKUS) 100-50 mcg/dose diskus inhaler, Inhale 1 puff into the lungs 2 (two) times daily. Controller, Disp: 180 each, Rfl: 3    gabapentin (NEURONTIN) 300 MG capsule, Take 1 capsule (300 mg total) by mouth 3 (three) times daily., Disp: 270 capsule, Rfl: 3    insulin glargine (LANTUS SOLOSTAR) 100 unit/mL (3 mL) InPn pen, Inject 50 Units into the skin every evening., Disp: 2 Box, Rfl: 11    insulin syringe-needle U-100 1 mL 31 gauge x 5/16 Syrg, , Disp: , Rfl:     latanoprost 0.005 % ophthalmic solution, Place 1 drop into both eyes every evening., Disp: 1 Bottle, Rfl: 4    lisinopril (PRINIVIL,ZESTRIL) 5 MG tablet, Take 1 tablet (5 mg total) by mouth once daily., Disp: 90 tablet, Rfl: 2    metformin (GLUCOPHAGE) 1000 MG tablet, Take 1 tablet (1,000 mg total) by mouth 2 (two) times daily with meals., Disp: 180 tablet, Rfl: 3    metoprolol succinate (TOPROL-XL) 50 MG 24 hr tablet, TAKE 1 TABLET  "EVERY DAY, Disp: 30 tablet, Rfl: 5    nitroGLYCERIN (NITROSTAT) 0.3 MG SL tablet, Place 1 tablet (0.3 mg total) under the tongue every 5 (five) minutes as needed for Chest pain. No more than 3 tablets in one day., Disp: 30 tablet, Rfl: 1    NOVOLOG FLEXPEN 100 unit/mL InPn pen, ADMINISTER 20 UNITS UNDER THE SKIN THREE TIMES DAILY WITH MEALS, Disp: 15 mL, Rfl: 0    pantoprazole (PROTONIX) 40 MG tablet, Take 1 tablet (40 mg total) by mouth once daily., Disp: 30 tablet, Rfl: 11    pen needle, diabetic (BD INSULIN PEN NEEDLE UF SHORT) 31 gauge x 5/16" Ndle, Inject 1 each into the skin 3 (three) times daily., Disp: 90 each, Rfl: 11    sertraline (ZOLOFT) 25 MG tablet, , Disp: , Rfl:     tiotropium (SPIRIVA WITH HANDIHALER) 18 mcg inhalation capsule, Inhale 1 capsule (18 mcg total) into the lungs once daily. Controller, Disp: 90 capsule, Rfl: 3    trazodone (DESYREL) 150 MG tablet, , Disp: , Rfl:     TRUE METRIX AIR GLUCOSE METER kit, TEST FOUR TIMES DAILY BEFORE MEALS  AND EVERY NIGHT, Disp: 1 each, Rfl: 0    VENTOLIN HFA 90 mcg/actuation inhaler, , Disp: , Rfl:     isosorbide mononitrate (IMDUR) 30 MG 24 hr tablet, Take 1 tablet (30 mg total) by mouth once daily., Disp: 30 tablet, Rfl: 0    Review of Systems   Constitutional: Negative for chills and fever.   Eyes: Negative for visual disturbance.   Respiratory: Negative for cough and shortness of breath.    Cardiovascular: Negative for chest pain.   Gastrointestinal: Positive for nausea. Negative for abdominal pain.   Musculoskeletal: Positive for arthralgias and back pain.   Neurological: Positive for dizziness, light-headedness, numbness and headaches.       Objective:   /77 (BP Location: Left arm, Patient Position: Sitting, BP Method: Large (Automatic))   Pulse 83   Temp 97.6 °F (36.4 °C) (Tympanic)   Resp 20   Ht 5' 6" (1.676 m)   Wt 97.7 kg (215 lb 6.2 oz)   SpO2 97%   BMI 34.76 kg/m²      Physical Exam   Constitutional: He is oriented to " person, place, and time. He appears well-developed and well-nourished.   HENT:   Head: Normocephalic and atraumatic.   Eyes: Conjunctivae are normal.   Cardiovascular: Normal rate.    Pulmonary/Chest: Effort normal. No respiratory distress.   Musculoskeletal: He exhibits no edema.   Neurological: He is alert and oriented to person, place, and time. Coordination (he was having abnormal appearance whenever he was changing position seemed to be having pretty intense headaches during the encounter.  There were no focal neurological deficits however.) abnormal.   Skin: Skin is warm and dry. No rash noted.   Psychiatric: He has a normal mood and affect. His behavior is normal.   Vitals reviewed.      Assessment & Plan     1. Persistent headaches  Today's encounter focused on his persistent headaches with associated nausea and dizziness.  Also quite concerned about his presentation and wanted to not only rule out an intracranial issue but to investigate whether he could possibly have giant cell arteritis since he is having a focal tenderness over his left temporal area.  Therefore I also waylon a CRP and sedimentation rate.  Before finishing this note we had obtained the MRI results which are normal and there is no acute signs of any aneurysm or intracranial process.  CRP is within normal limits at this time and we are still waiting the results of ESR  - Sedimentation rate, manual; Future  - C-reactive protein; Future  - MRI Brain Without Contrast; Future    He certainly needs to return frequently to discuss chronic medical conditions.  Fortunately he has social work and case management helping him to access resources.  Encouraged him to continue adhering with diabetes.  A big focus is trying to get his hip pain better so he can be more functional and hopefully get back to work so he can have more disposable income.

## 2017-08-22 NOTE — TELEPHONE ENCOUNTER
----- Message from Mateo Lambert, DO sent at 8/22/2017  4:11 PM CDT -----  Please inform patient that all of the labs are normal if he/she does not have [facility] activated. If there are any questions please let me know.

## 2017-08-23 ENCOUNTER — TELEPHONE (OUTPATIENT)
Dept: INTERNAL MEDICINE | Facility: CLINIC | Age: 62
End: 2017-08-23

## 2017-08-23 DIAGNOSIS — M31.6 GCA (GIANT CELL ARTERITIS): Primary | ICD-10-CM

## 2017-08-23 RX ORDER — PREDNISONE 20 MG/1
60 TABLET ORAL DAILY
Qty: 42 TABLET | Refills: 0 | Status: SHIPPED | OUTPATIENT
Start: 2017-08-23 | End: 2017-09-06

## 2017-08-23 NOTE — TELEPHONE ENCOUNTER
Labs show there is a possibility that symptoms are caused by an issue with artery near temple. Need to start high dose steroids, get in with general surgery asap for a biopsy and then see rheumatology next week    If Dr Zhao is available for gen sx please use him so the pt can f/u here in [place]. Please schedule his appts, may need to message nurses in order to get him worked in

## 2017-08-23 NOTE — TELEPHONE ENCOUNTER
Called and spoke with patient and was given result. Patient states was going to call and see what was the cost of medication. Explained that he needed to start steroids as soon as possible. Explain that doctor want him to be seen by general surgery to have a bisopy and will call him to schedule this as well as the appointment with rheumatology.    Informed patient will tried to see if someone was still in office to get this appointment schedule with General Surgery. Called patient informed that need to sent a message to that department and they will contact him to schedule. Called General surgery,was told that they see patient after they have been seen by rheumatology and to call back after an appointment has been schedule. tried to contact rheumatology department no answer. Will tried again in the morning    GCA (giant cell arteritis.

## 2017-08-24 ENCOUNTER — TELEPHONE (OUTPATIENT)
Dept: RHEUMATOLOGY | Facility: CLINIC | Age: 62
End: 2017-08-24

## 2017-08-24 NOTE — TELEPHONE ENCOUNTER
----- Message from Gato Jimenez MD sent at 8/23/2017  3:04 PM CDT -----  I would start him on 60 mg day prednisone if high suspicion.  Get Gen Surg to do bx  this week. We can get in nxt week and see whats evolved  ----- Message -----  From: Mateo Lambert DO  Sent: 8/23/2017   8:33 AM  To: MD Dr. Barbara Schaeffer, I have some suspicion of GCA in this gentleman. No vision issues yet but unilateral temporal headache and symptoms that seem like jaw claudication. MRI brain without any specific findings. Can I get him in with you? Should I start predni    sone and who would perform temporal artery biopsy?

## 2017-08-24 NOTE — TELEPHONE ENCOUNTER
Patient has been schedule with rheumatology on 08/28/17. Do you still want him to be seen this week in general. Surgery?

## 2017-08-24 NOTE — TELEPHONE ENCOUNTER
Spoke with Mr. CabralNorma and scheduled a New Patient appointment with Dr. Jimenez for 08/28/2017 at 1:30pm per Dr. Jimenez's request for Giant Cell Arteritis. Patient will have his cousin pick him up from his appointment. He will call if anything changes.

## 2017-08-28 ENCOUNTER — TELEPHONE (OUTPATIENT)
Dept: SURGERY | Facility: CLINIC | Age: 62
End: 2017-08-28

## 2017-08-28 ENCOUNTER — LAB VISIT (OUTPATIENT)
Dept: LAB | Facility: HOSPITAL | Age: 62
End: 2017-08-28
Attending: SURGERY
Payer: MEDICARE

## 2017-08-28 ENCOUNTER — TELEPHONE (OUTPATIENT)
Dept: RHEUMATOLOGY | Facility: CLINIC | Age: 62
End: 2017-08-28

## 2017-08-28 ENCOUNTER — OFFICE VISIT (OUTPATIENT)
Dept: SURGERY | Facility: CLINIC | Age: 62
End: 2017-08-28
Payer: MEDICARE

## 2017-08-28 VITALS
SYSTOLIC BLOOD PRESSURE: 138 MMHG | TEMPERATURE: 98 F | DIASTOLIC BLOOD PRESSURE: 84 MMHG | BODY MASS INDEX: 35.19 KG/M2 | WEIGHT: 218.06 LBS | HEART RATE: 97 BPM

## 2017-08-28 DIAGNOSIS — R51.9 HEADACHE, UNSPECIFIED HEADACHE TYPE: Primary | ICD-10-CM

## 2017-08-28 DIAGNOSIS — R51.9 HEADACHE, UNSPECIFIED HEADACHE TYPE: ICD-10-CM

## 2017-08-28 DIAGNOSIS — R51.9 HEADACHE: ICD-10-CM

## 2017-08-28 LAB
ANION GAP SERPL CALC-SCNC: 9 MMOL/L
BASOPHILS # BLD AUTO: 0.01 K/UL
BASOPHILS NFR BLD: 0.1 %
BUN SERPL-MCNC: 22 MG/DL
CALCIUM SERPL-MCNC: 9.1 MG/DL
CHLORIDE SERPL-SCNC: 101 MMOL/L
CO2 SERPL-SCNC: 28 MMOL/L
CREAT SERPL-MCNC: 1 MG/DL
DIFFERENTIAL METHOD: ABNORMAL
EOSINOPHIL # BLD AUTO: 0 K/UL
EOSINOPHIL NFR BLD: 0.1 %
ERYTHROCYTE [DISTWIDTH] IN BLOOD BY AUTOMATED COUNT: 13.6 %
EST. GFR  (AFRICAN AMERICAN): >60 ML/MIN/1.73 M^2
EST. GFR  (NON AFRICAN AMERICAN): >60 ML/MIN/1.73 M^2
GLUCOSE SERPL-MCNC: 194 MG/DL
HCT VFR BLD AUTO: 41.5 %
HGB BLD-MCNC: 13.6 G/DL
LYMPHOCYTES # BLD AUTO: 3.1 K/UL
LYMPHOCYTES NFR BLD: 16.7 %
MCH RBC QN AUTO: 28.5 PG
MCHC RBC AUTO-ENTMCNC: 32.8 G/DL
MCV RBC AUTO: 87 FL
MONOCYTES # BLD AUTO: 1.2 K/UL
MONOCYTES NFR BLD: 6.5 %
NEUTROPHILS # BLD AUTO: 14.3 K/UL
NEUTROPHILS NFR BLD: 76.2 %
PLATELET # BLD AUTO: 217 K/UL
PMV BLD AUTO: 10.7 FL
POTASSIUM SERPL-SCNC: 4 MMOL/L
RBC # BLD AUTO: 4.77 M/UL
SODIUM SERPL-SCNC: 138 MMOL/L
WBC # BLD AUTO: 18.79 K/UL

## 2017-08-28 PROCEDURE — 3075F SYST BP GE 130 - 139MM HG: CPT | Mod: S$GLB,,, | Performed by: SURGERY

## 2017-08-28 PROCEDURE — 99999 PR PBB SHADOW E&M-EST. PATIENT-LVL IV: CPT | Mod: PBBFAC,,, | Performed by: SURGERY

## 2017-08-28 PROCEDURE — 80048 BASIC METABOLIC PNL TOTAL CA: CPT

## 2017-08-28 PROCEDURE — 36415 COLL VENOUS BLD VENIPUNCTURE: CPT | Mod: PO

## 2017-08-28 PROCEDURE — 99213 OFFICE O/P EST LOW 20 MIN: CPT | Mod: S$GLB,,, | Performed by: SURGERY

## 2017-08-28 PROCEDURE — 3079F DIAST BP 80-89 MM HG: CPT | Mod: S$GLB,,, | Performed by: SURGERY

## 2017-08-28 PROCEDURE — 3008F BODY MASS INDEX DOCD: CPT | Mod: S$GLB,,, | Performed by: SURGERY

## 2017-08-28 PROCEDURE — 85025 COMPLETE CBC W/AUTO DIFF WBC: CPT

## 2017-08-28 RX ORDER — SODIUM CHLORIDE 9 MG/ML
INJECTION, SOLUTION INTRAVENOUS CONTINUOUS
Status: CANCELLED | OUTPATIENT
Start: 2017-08-28

## 2017-08-28 NOTE — LETTER
August 28, 2017      Mateo Lambert,   [address] [address] [address]           [facility]  [address] [facility] [address]  Phone: [phone]  Fax: [phone]          Patient: Crow Green   MR Number: [id number]   YOB: 1955   Date of Visit: 8/28/2017       Dear Dr. Mateo Lambert:    Thank you for referring Crow Green to me for evaluation. Attached you will find relevant portions of my assessment and plan of care.    If you have questions, please do not hesitate to call me. I look forward to following Crow Green along with you.    Sincerely,    Torin Bishop MD    Enclosure  CC:  No Recipients    If you would like to receive this communication electronically, please contact [email or web address] or [phone] to request more information on [email or web address] Link access.    For providers and/or their staff who would like to refer a patient to [facility], please contact us through our one-stop-shop provider referral line, [facility], at [phone].    If you feel you have received this communication in error or would no longer like to receive these types of communications, please e-mail [email or web address]

## 2017-08-28 NOTE — TELEPHONE ENCOUNTER
Spoke with Mr. Green who states that he did not come to his appointment today with Dr. Jimenez due to transportation leaving for 2:00pm and he did not want to miss it. Per Dr. Jimenez Mr. Green's appointment was rescheduled for 08/29/2017 at 11:30am. I also informed him to contact this nurse if he has to reschedule so that we know why his appointment was missed and can reschedule. Mr. Green states understanding.

## 2017-08-28 NOTE — TELEPHONE ENCOUNTER
"Spoke with Tricia Eubanks/[facility] with Dr Zhao/Cardio, informed pt is having Sx on 9/1/2017 Temporal Artery Biopsy (Left) and will need  Pre-opcardic clearance to Hold Apixaban 5 mg and Asa 81 mg per Dr. Bishop. Elaine stated,"she will let Dr. Zhao know of this matter."  "

## 2017-08-28 NOTE — PROGRESS NOTES
History & Physical    SUBJECTIVE:     History of Present Illness:  Patient is a 62 y.o. male referred for temporal artery biopsy.  Patient recently seen by PCP and noted to have left-sided headaches, blurry vision changes (last one associated with low blood sugar), jaw pain worse with eating however some at rest and facial pain(maxillary region).  He reports the headaches have improved since starting prednisone.  He is currently on Eliquis for A. fib and aspirin    Chief Complaint   Patient presents with    Consult       Review of patient's allergies indicates:  No Known Allergies    Current Outpatient Prescriptions   Medication Sig Dispense Refill    ALCOHOL ANTISEPTIC PADS (ALCOHOL PREP PADS TOP)       amiodarone (PACERONE) 200 MG Tab Take 1 tablet (200 mg total) by mouth once daily. 30 tablet 11    amlodipine (NORVASC) 10 MG tablet Take 1 tablet (10 mg total) by mouth once daily. 90 tablet 3    ammonium lactate 12 % Crea Apply 1 Act topically 2 (two) times daily. 1 Tube 3    apixaban 5 mg Tab Take 1 tablet (5 mg total) by mouth 2 (two) times daily. 60 tablet 1    aspirin (ECOTRIN) 81 MG EC tablet Take 1 tablet (81 mg total) by mouth once daily.  0    atorvastatin (LIPITOR) 40 MG tablet Take 1 tablet (40 mg total) by mouth every evening. 90 tablet 3    blood sugar diagnostic Strp 1 strip by Misc.(Non-Drug; Combo Route) route 6 (six) times daily. 160 strip 11    duloxetine (CYMBALTA) 30 MG capsule       fluticasone-salmeterol 100-50 mcg/dose (ADVAIR DISKUS) 100-50 mcg/dose diskus inhaler Inhale 1 puff into the lungs 2 (two) times daily. Controller 180 each 3    gabapentin (NEURONTIN) 300 MG capsule Take 1 capsule (300 mg total) by mouth 3 (three) times daily. 270 capsule 3    insulin glargine (LANTUS SOLOSTAR) 100 unit/mL (3 mL) InPn pen Inject 50 Units into the skin every evening. 2 Box 11    insulin syringe-needle U-100 1 mL 31 gauge x 5/16 Syrg       isosorbide mononitrate (IMDUR) 30 MG 24 hr  "tablet Take 1 tablet (30 mg total) by mouth once daily. 30 tablet 0    latanoprost 0.005 % ophthalmic solution Place 1 drop into both eyes every evening. 1 Bottle 4    lisinopril (PRINIVIL,ZESTRIL) 5 MG tablet Take 1 tablet (5 mg total) by mouth once daily. 90 tablet 2    metformin (GLUCOPHAGE) 1000 MG tablet Take 1 tablet (1,000 mg total) by mouth 2 (two) times daily with meals. 180 tablet 3    metoprolol succinate (TOPROL-XL) 50 MG 24 hr tablet TAKE 1 TABLET EVERY DAY 30 tablet 5    nitroGLYCERIN (NITROSTAT) 0.3 MG SL tablet Place 1 tablet (0.3 mg total) under the tongue every 5 (five) minutes as needed for Chest pain. No more than 3 tablets in one day. 30 tablet 1    NOVOLOG FLEXPEN 100 unit/mL InPn pen ADMINISTER 20 UNITS UNDER THE SKIN THREE TIMES DAILY WITH MEALS 15 mL 0    pantoprazole (PROTONIX) 40 MG tablet Take 1 tablet (40 mg total) by mouth once daily. 30 tablet 11    pen needle, diabetic (BD INSULIN PEN NEEDLE UF SHORT) 31 gauge x 5/16" Ndle Inject 1 each into the skin 3 (three) times daily. 90 each 11    predniSONE (DELTASONE) 20 MG tablet Take 3 tablets (60 mg total) by mouth once daily. 42 tablet 0    sertraline (ZOLOFT) 25 MG tablet       tiotropium (SPIRIVA WITH HANDIHALER) 18 mcg inhalation capsule Inhale 1 capsule (18 mcg total) into the lungs once daily. Controller 90 capsule 3    trazodone (DESYREL) 150 MG tablet       TRUE METRIX AIR GLUCOSE METER kit TEST FOUR TIMES DAILY BEFORE MEALS  AND EVERY NIGHT 1 each 0    VENTOLIN HFA 90 mcg/actuation inhaler        No current facility-administered medications for this visit.        Past Medical History:   Diagnosis Date    Arthritis     Asthma     Depression     Diabetes mellitus     Diabetes mellitus, type 2 Diagnosed in 2000    Elevated PSA     Hypertension      History reviewed. No pertinent surgical history.  Family History   Problem Relation Age of Onset    Glaucoma Mother     Cataracts Mother     Cataracts Father     " Glaucoma Sister     Cataracts Sister     Glaucoma Maternal Aunt     Prostate cancer Neg Hx      Social History   Substance Use Topics    Smoking status: Never Smoker    Smokeless tobacco: Never Used    Alcohol use No        Review of Systems:  Review of Systems   Constitutional: Negative for chills, fever and unexpected weight change.   HENT: Negative for congestion.         Facial pain/jaw pain   Eyes: Positive for visual disturbance.   Respiratory: Negative for shortness of breath.    Cardiovascular: Negative for chest pain.   Gastrointestinal: Negative for abdominal distention, abdominal pain, constipation, nausea, rectal pain and vomiting.   Genitourinary: Negative for dysuria.   Musculoskeletal: Negative for arthralgias.   Skin: Negative for rash.   Neurological: Positive for headaches. Negative for dizziness, syncope, facial asymmetry and light-headedness.   Hematological: Negative for adenopathy.       OBJECTIVE:     Vital Signs (Most Recent)  Temp: 97.7 °F (36.5 °C) (08/28/17 1043)  Pulse: 97 (08/28/17 1043)  BP: 138/84 (08/28/17 1043)     98.9 kg (218 lb 0.6 oz)     Physical Exam:  Physical Exam   Constitutional: He is oriented to person, place, and time. He appears well-developed and well-nourished.   HENT:   Head: Normocephalic and atraumatic.   Eyes: EOM are normal.   Neck: Normal range of motion. Neck supple.   Cardiovascular: Normal rate and regular rhythm.    Pulmonary/Chest: Effort normal and breath sounds normal.   Abdominal: Soft. Bowel sounds are normal. He exhibits no distension. There is no tenderness.   Neurological: He is alert and oriented to person, place, and time.   Skin: Skin is warm and dry.   Vitals reviewed.    Laboratory  ESR 48  CRP 5.3    Diagnostic Results:  MRI head:  1.  No evidence of recent infarction or other acute intracranial findings.    2.  Moderate degree of nonspecific white matter signal changes which likely represent sequela of chronic microvascular ischemic  disease.      ASSESSMENT/PLAN:     62-year-old male with left-sided headaches, jaw/facial pain, visual changes     PLAN:Plan     Left temporal artery biopsy 9/1/17  Cardiology clearance to hold Eliquis prior to procedure  Preop: CBC, BMP; stress test reviewed  Risks and benefits discussed with patient including: Pain, bleeding, infection, no diagnosis, need for further procedure  Keep appointment with rheumatology today

## 2017-08-29 ENCOUNTER — TELEPHONE (OUTPATIENT)
Dept: RHEUMATOLOGY | Facility: CLINIC | Age: 62
End: 2017-08-29

## 2017-08-29 ENCOUNTER — OFFICE VISIT (OUTPATIENT)
Dept: CARDIOLOGY | Facility: CLINIC | Age: 62
End: 2017-08-29
Payer: MEDICARE

## 2017-08-29 ENCOUNTER — LAB VISIT (OUTPATIENT)
Dept: LAB | Facility: HOSPITAL | Age: 62
End: 2017-08-29
Attending: INTERNAL MEDICINE
Payer: MEDICARE

## 2017-08-29 ENCOUNTER — OFFICE VISIT (OUTPATIENT)
Dept: RHEUMATOLOGY | Facility: CLINIC | Age: 62
End: 2017-08-29
Payer: MEDICARE

## 2017-08-29 VITALS
BODY MASS INDEX: 35.15 KG/M2 | WEIGHT: 218.69 LBS | SYSTOLIC BLOOD PRESSURE: 139 MMHG | DIASTOLIC BLOOD PRESSURE: 79 MMHG | HEART RATE: 101 BPM | HEIGHT: 66 IN

## 2017-08-29 VITALS
BODY MASS INDEX: 35.19 KG/M2 | SYSTOLIC BLOOD PRESSURE: 132 MMHG | HEIGHT: 66 IN | WEIGHT: 218.94 LBS | HEART RATE: 88 BPM | DIASTOLIC BLOOD PRESSURE: 78 MMHG

## 2017-08-29 DIAGNOSIS — R70.0 ELEVATED ERYTHROCYTE SEDIMENTATION RATE: Chronic | ICD-10-CM

## 2017-08-29 DIAGNOSIS — H53.8 BLURRED VISION, BILATERAL: ICD-10-CM

## 2017-08-29 DIAGNOSIS — I48.0 PAF (PAROXYSMAL ATRIAL FIBRILLATION): ICD-10-CM

## 2017-08-29 DIAGNOSIS — R51.9 PERSISTENT HEADACHES: Chronic | ICD-10-CM

## 2017-08-29 DIAGNOSIS — Z01.810 PREOP CARDIOVASCULAR EXAM: Primary | ICD-10-CM

## 2017-08-29 DIAGNOSIS — I25.10 CORONARY ARTERY DISEASE INVOLVING NATIVE CORONARY ARTERY OF NATIVE HEART WITHOUT ANGINA PECTORIS: ICD-10-CM

## 2017-08-29 DIAGNOSIS — E11.42 DIABETIC POLYNEUROPATHY ASSOCIATED WITH TYPE 2 DIABETES MELLITUS: Primary | ICD-10-CM

## 2017-08-29 LAB
CRP SERPL-MCNC: 1.2 MG/L
ERYTHROCYTE [SEDIMENTATION RATE] IN BLOOD BY WESTERGREN METHOD: 15 MM/HR
IGA SERPL-MCNC: 643 MG/DL
IGG SERPL-MCNC: 1202 MG/DL
IGM SERPL-MCNC: 55 MG/DL

## 2017-08-29 PROCEDURE — 82784 ASSAY IGA/IGD/IGG/IGM EACH: CPT | Mod: 59

## 2017-08-29 PROCEDURE — 3078F DIAST BP <80 MM HG: CPT | Mod: S$GLB,,, | Performed by: INTERNAL MEDICINE

## 2017-08-29 PROCEDURE — 86140 C-REACTIVE PROTEIN: CPT

## 2017-08-29 PROCEDURE — 3008F BODY MASS INDEX DOCD: CPT | Mod: S$GLB,,, | Performed by: INTERNAL MEDICINE

## 2017-08-29 PROCEDURE — 85651 RBC SED RATE NONAUTOMATED: CPT | Mod: PO

## 2017-08-29 PROCEDURE — 3075F SYST BP GE 130 - 139MM HG: CPT | Mod: S$GLB,,, | Performed by: INTERNAL MEDICINE

## 2017-08-29 PROCEDURE — 99999 PR PBB SHADOW E&M-EST. PATIENT-LVL III: CPT | Mod: PBBFAC,,, | Performed by: INTERNAL MEDICINE

## 2017-08-29 PROCEDURE — 84165 PROTEIN E-PHORESIS SERUM: CPT

## 2017-08-29 PROCEDURE — 4010F ACE/ARB THERAPY RXD/TAKEN: CPT | Mod: S$GLB,,, | Performed by: INTERNAL MEDICINE

## 2017-08-29 PROCEDURE — 3046F HEMOGLOBIN A1C LEVEL >9.0%: CPT | Mod: S$GLB,,, | Performed by: INTERNAL MEDICINE

## 2017-08-29 PROCEDURE — 84165 PROTEIN E-PHORESIS SERUM: CPT | Mod: 26,,, | Performed by: PATHOLOGY

## 2017-08-29 PROCEDURE — 99204 OFFICE O/P NEW MOD 45 MIN: CPT | Mod: S$GLB,,, | Performed by: INTERNAL MEDICINE

## 2017-08-29 PROCEDURE — 99214 OFFICE O/P EST MOD 30 MIN: CPT | Mod: S$GLB,,, | Performed by: INTERNAL MEDICINE

## 2017-08-29 PROCEDURE — 86334 IMMUNOFIX E-PHORESIS SERUM: CPT

## 2017-08-29 PROCEDURE — 36415 COLL VENOUS BLD VENIPUNCTURE: CPT | Mod: PO

## 2017-08-29 PROCEDURE — 86334 IMMUNOFIX E-PHORESIS SERUM: CPT | Mod: 26,,, | Performed by: PATHOLOGY

## 2017-08-29 NOTE — TELEPHONE ENCOUNTER
----- Message from Skylar Whitt sent at 8/29/2017  1:24 PM CDT -----  Contact: pt  Please call pt @ [phone], pt was told to call if anything change, pt states he have a change.

## 2017-08-29 NOTE — TELEPHONE ENCOUNTER
Spoke with Mr. Maria per Dr. Jimenez's advisement. Eye doctor appointment scheduled for 08/31/2017 due to transportation issues.

## 2017-08-29 NOTE — TELEPHONE ENCOUNTER
----- Message from Gato Jimenez MD sent at 8/29/2017  3:07 PM CDT -----  OK- se on thurs  ----- Message -----  From: Babs Sumner LPN  Sent: 8/29/2017   1:40 PM  To: Gato Jimenez MD    Spoke with Mr. Green who states that transportation cannot bring him on tomorrow because they are full. He states he does not have any family that can bring him. He would like to know if he can see the eye doctor on Thursday. Dr. Jimenez notified and this nurse will call back once he advises.

## 2017-08-29 NOTE — PROGRESS NOTES
RHEUMATOLOGY NOTE    CHIEF COMPLAINT:  Severe headaches.    PERTINENT HISTORY:  Crow was seen last week by Dr. Lambert.  He does have some   problems with his hips and other areas.  Dr. Lambert noted he was having   significant headaches, have been going off and on for a while.  He noted that he   had tender left temporal artery.  He called me and we elected to put him on   prednisone 60 mg until he gets his temporal artery biopsy.  He states that since   he has been on prednisone, he has had no significant improvement in the   headaches.    He tells he has had headaches off and on for eight to nine months and   particularly being left sided.  They are frequently associated with flashes of   dizziness.  It helps when holding his head particularly steady and aggravated by   motion.  He does get dizziness at times.  He does note that he tends to clinch   his jaws and that causes some pain.  He notes he chews on one side than the   other side and he gets pain.  He denies having TMJ by history.  He has had   visual blurring occurring off and on more recently.  He has followed diabetes   and not sure if he has any diabetic eye changes.  He has not seen the eye doctor   recently.  He does not have polymyalgia rheumatica as far as classic symptoms,   more chronic stiffness.  It will go away.  He does have chronic right shoulder   pain for three or four years, limited motion.  He has had recent right hip pain   as well as going to physical therapy for that.  His past history, positive as   mentioned for diabetes.  He is on medicines including two different doses of   insulin for that as well as metformin.  Past history also positive for   hypertension, hyperlipidemia, paroxysmal atrial fibrillation as well as coronary   artery disease.  He has had elevated PSA in the past as well.  He has had   obstructive sleep apnea as well as asthma from a pulmonary standpoint.  In   further viewing his records, he has been told he has a  nonproliferative diabetic   retinopathy.  Polyneuropathy has also been documented.    Allergies to medication not appreciated.    SOCIAL HISTORY:  Negative for smoking.  No tobacco or smokeless.  Does not drink   alcohol.  Family history is positive for diabetes as well as cataracts,   glaucoma, prostate cancer.    REVIEW OF SYSTEMS:  GENERAL:  With no increase weight loss.  No fevers, chills, or sweats.  No   fatigability, change in appetite, weight loss.  No recent infections.  SKIN:  Has been clear.  No history of recent shingles.  No rashes, itching or   changes in color or texture of skin, no Raynaud's, no malar rash.  HEAD:  Headaches, neuro changes as per HPI.  No recent head trauma.  EYES:  Vision changes and blurriness as per HPI.  Visual acuity fine.  No ocular   pain or redness.  EARS:  Denies ear pain, discharge or vertigo.  NOSE:  No loss of smell.  No epistaxis or postnasal drip.  MOUTH AND THROAT:  No hoarseness or change in voice or oral ulcers.  No   difficulty swallowing or dryness.  NODES:  Denies swollen glands.  CHEST:  Pulmonary with SANDRITA is noted.  Denies shortness of breath, VARGAS, cyanosis,   wheezing, cough and sputum production.  CARDIOVASCULAR:  Cardiac with hypertension and coronary artery disease is noted.    Denies chest pain, PND, orthopnea or reduced exercise tolerance.  ABDOMEN:  GI tract recently stable.  He is on Protonix for reflux.  No weight   loss.  Denies nausea, vomiting, diarrhea, abdominal pain, hematemesis or blood   in stool.  ENDOCRINE:  See HPI.  Significant diabetes.    URINARY:  No flank pain, dysuria or hematuria.  PERIPHERAL VASCULAR:  No claudication or cyanosis.  NEUROLOGIC:  No numbness, weakness or tingling.    PHYSICAL EXAMINATION:  VITAL SIGNS:  Exam with his weight 99 kg, with height 5 feet 6 inches, blood   pressure 139/79, heart rate of 100, pain score 8 involving his left temporal   area.  APPEARANCE:  Well nourished, well developed, in no acute  distress.  SKIN:  No rashes, no wounds, no ecchymosis.  Nail beds pink.  No digital ulcers.    No nodules or tightness noted.  HEENT:  Definite tenderness in the left temporal artery of the right.  He has a   good pulse.  His jaws have mild crepitus.  He has significant limitation motion   of the right shoulder with positive impingement sign.  He does not have   capsulitis and is partially getting into 160 degrees, left shoulder is normal.    His neck is supple.  He has had no adenopathy.  No thyroid enlargement.  No   parotid enlargement.  No signs of any skin rashes or zoster involving the left   head.  Normocephalic, atraumatic, alert and oriented x3.  PERRLA.  Conjunctivae   clear, sclerae nonicteric.  No tonsillar enlargement.  No pharyngeal erythema or   exudate.  No ulcers or lesions noted.  Mucous membranes moist and pink.  Oral   pharynx clear.  Neck supple, no JVD.  Normal ROM.  Thyroid normal.  No masses or   tracheal deviation.  No cervical, axillary or inguinal lymph node enlargement.  CHEST:  Lungs clear to auscultation bilaterally.  No dullness to percussion.  No   accessory muscle use.  No intercostal retraction noted.  CARDIOVASCULAR:  Normal S1, S2.  No rubs, murmurs or gallops.  No peripheral   edema.  ABDOMEN:  Bowel sounds normal, abdomen soft, not distended.  No tenderness or   masses.  No hepatosplenomegaly.  EXTREMITIES:  No clubbing, cyanosis or edema noted.  Dorsalis and pedis pulses   2+ palpable.  NEUROLOGICAL:  Cranial nerves II-XII intact.  Motor 5/5 strength major   flexors/extensors.  No tremor.  GAIT AND POSTURE:  Normal gait.  No cerebellar signs.  MUSCULOSKELETAL:  His hands have full motion, no synovitis.  Wrist likewise,   full motion, no synovitis.  Elbows normal.  Hips, some tenderness in the right   trochanteric bursa and mild low back tenderness.  Knees with crepitus, but not   swollen.  Peripheral nerve exam shows good strength in his lower extremities.    No signs of upper  motor neuron findings.  Pulses are normal peripherally.    LABORATORY DATA:  Lab data shows his white count 18,000 is now elevated from the   steroids.  Hematocrit 41%.  Platelets are 217,000.  Sed rates dropped from 48   to 15.  Electrolytes are normal including creatinine.  MRI of the brain was   normal.  CRP was normal at 5.3 before steroids were started.    IMPRESSIONS:  1.  Chronic headaches with recent exacerbation, left-sided.  It is not sure that   this represents a migraine variant, inner ear or some type of vestibular   dysfunction, With increased esr, this could possibly be    temporal arteritis.  Is This is a kind of atypical PMR?  Visual blurring   could be multifactorial.  The general symptoms are not classic- TMJ pain not just with   chewing, but he does have a lot of crunching he says, not clear.  2.  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  3.  Obesity, BMI of 35.  4.  Cardiac comorbidities significant including atrial fibrillation.  5.  SANDRITA and asthma by history.  6.  Increased sed rate with normal CRP.  I wonder if it could be any protein   abnormalities in his blood or other reasons rather than pure inflammation.    PLAN:  1.  Temporal artery biopsy as scheduled in two days and would like stay on 60 mg   of prednisone through that time.  2.  Low dose aspirin was stopped.  As soon as he gets his biopsy, we would like   to get back on that.  3.  We will recheck his lab today including sed rate and CRP and also, we will   get a serum protein electrophoresis and the immunoglobulin levels.  4.  We will send him down to Ophthalmology as soon as possible with his visual   blurring.  He will get there and take on this or if he has any ischemic changes   in his optic nerve.  We will see him back within a week.    Thank you for asking me to consult on his care.    Sincerely yours,    MD BESSY Cleveland/EDI  dd: 08/29/2017 18:16:51 (CDT)  td: 08/29/2017 22:04:56 (CDT)   Doc ID   #[id number]  Job ID #[id number]    CC:

## 2017-08-29 NOTE — PATIENT INSTRUCTIONS
Continue 60 mg pred. Day    Adjust insulin if sugar gets too high    Resume Asprin after biopsy this Friday

## 2017-08-29 NOTE — LETTER
August 29, 2017      YAYA Lam,FNP-C  [address] [address] [address]           [facility]  [address] [facility] [address]  Phone: [phone]  Fax: [phone]          Patient: Crow Green   MR Number: [id number]   YOB: 1955   Date of Visit: 8/29/2017       Dear Lucy Martinez:    Thank you for referring Crow Green to me for evaluation. Attached you will find relevant portions of my assessment and plan of care.    If you have questions, please do not hesitate to call me. I look forward to following Crow Green along with you.    Sincerely,    Gato Jimenez MD    Enclosure  CC:  No Recipients    If you would like to receive this communication electronically, please contact [email or web address] or [phone] to request more information on [employer] access.    For providers and/or their staff who would like to refer a patient to [facility], please contact us through our one-stop-shop provider referral line, [facility], at [phone].    If you feel you have received this communication in error or would no longer like to receive these types of communications, please e-mail [email or web address]

## 2017-08-29 NOTE — TELEPHONE ENCOUNTER
Spoke with Mr. Green who states that transportation cannot bring him on tomorrow because they are full. He states he does not have any family that can bring him. He would like to know if he can go on Thursday. Dr. Jimenez notified and this nurse will call back once he advises.

## 2017-08-29 NOTE — PROGRESS NOTES
Subjective:   Patient ID:  Crow Green is a 62 y.o. male who presents for follow up of Pre-op Exam (off blood thinners)      62 yo AAM, came in for preop clearance of left temporal artery biopsy on 09/01  PMH CAD nonobstructive per LHC done in 11/16, PAF, CHFpEF, DM, hTN, HLD and obesity,  -11/2016, Pt was admitted to [facility] for cough with little sputum for 6 weeks. Had severe left chest pain only with cough and sitting up from the bed. No pain with exercise. Denied palpitation, dizziness, orthopnea, PND and syncope..  Troponin up to 0.218 and trending down. Echo showed EF 45% and MPI showed anteroseptal positive. Subsequent LHC showed d2 30% lesion and otherwise demi Dz.   -03/2017, he was admitted to [facility] for acute cholelithiasis, s/p lap aidan. Pt had PAF during the admisssion and has been on amiodarone and Eliquis. BNP was up to 144 from 44 done five months ago.  Today, c/o severe headache, L>>R.     EKG showed NSR, left axial deviation and LAE.                    Past Medical History:   Diagnosis Date    Arthritis     Asthma     Depression     Diabetes mellitus     Diabetes mellitus, type 2 Diagnosed in 2000    Elevated PSA     Hypertension        History reviewed. No pertinent surgical history.    Social History   Substance Use Topics    Smoking status: Never Smoker    Smokeless tobacco: Never Used    Alcohol use No       Family History   Problem Relation Age of Onset    Glaucoma Mother     Cataracts Mother     Cataracts Father     Glaucoma Sister     Cataracts Sister     Glaucoma Maternal Aunt     Prostate cancer Neg Hx          Review of Systems   Constitution: Negative for decreased appetite, diaphoresis, fever, weakness, malaise/fatigue and night sweats.   HENT: Positive for headaches. Negative for nosebleeds.    Eyes: Positive for blurred vision. Negative for double vision.   Cardiovascular: Negative for chest pain, claudication, dyspnea on exertion, irregular heartbeat, leg  swelling, near-syncope, orthopnea, palpitations, paroxysmal nocturnal dyspnea and syncope.   Respiratory: Negative for cough, shortness of breath, sleep disturbances due to breathing, snoring, sputum production and wheezing.    Endocrine: Negative for cold intolerance and polyuria.   Hematologic/Lymphatic: Does not bruise/bleed easily.   Skin: Negative for rash.   Musculoskeletal: Negative for back pain, falls, joint pain, joint swelling and neck pain.   Gastrointestinal: Positive for nausea. Negative for abdominal pain, heartburn and vomiting.   Genitourinary: Negative for dysuria, frequency and hematuria.   Neurological: Negative for difficulty with concentration, dizziness, focal weakness, light-headedness, numbness and seizures.   Psychiatric/Behavioral: Negative for depression, memory loss and substance abuse. The patient does not have insomnia.    Allergic/Immunologic: Negative for HIV exposure and hives.       Objective:   Physical Exam   Constitutional: He is oriented to person, place, and time. He appears well-nourished.   HENT:   Head: Normocephalic.   +tenderness on forehead   Eyes: Pupils are equal, round, and reactive to light.   Neck: Normal carotid pulses and no JVD present. Carotid bruit is not present. No thyromegaly present.   Cardiovascular: Normal rate, regular rhythm, normal heart sounds and normal pulses.   No extrasystoles are present. PMI is not displaced.  Exam reveals no gallop and no S3.    No murmur heard.  Pulmonary/Chest: Breath sounds normal. No stridor. No respiratory distress.   Bronchi b/l   Abdominal: Soft. Bowel sounds are normal. There is no tenderness. There is no rebound.   Musculoskeletal: Normal range of motion.   Neurological: He is alert and oriented to person, place, and time.   Skin: Skin is intact. No rash noted.   Psychiatric: His behavior is normal.       Lab Results   Component Value Date    CHOL 171 07/07/2017    CHOL 189 11/16/2016     Lab Results   Component Value  Date    HDL 42 07/07/2017    HDL 42 11/16/2016     Lab Results   Component Value Date    LDLCALC 112.4 07/07/2017    LDLCALC 120.2 11/16/2016     Lab Results   Component Value Date    TRIG 83 07/07/2017    TRIG 134 11/16/2016     Lab Results   Component Value Date    CHOLHDL 24.6 07/07/2017    CHOLHDL 22.2 11/16/2016       Chemistry        Component Value Date/Time     08/28/2017 1124    K 4.0 08/28/2017 1124     08/28/2017 1124    CO2 28 08/28/2017 1124    BUN 22 (H) 08/28/2017 1124    CREATININE 1.0 08/28/2017 1124     (H) 08/28/2017 1124        Component Value Date/Time    CALCIUM 9.1 08/28/2017 1124    ALKPHOS 69 07/13/2017 0340    AST 27 07/13/2017 0340    ALT 34 07/13/2017 0340    BILITOT 0.4 07/13/2017 0340    ESTGFRAFRICA >60.0 08/28/2017 1124    EGFRNONAA >60.0 08/28/2017 1124          Lab Results   Component Value Date    TSH 2.374 07/20/2017     Lab Results   Component Value Date    INR 1.0 03/15/2017    INR 1.0 11/16/2016    INR 1.0 11/15/2016     Lab Results   Component Value Date    WBC 18.79 (H) 08/28/2017    HGB 13.6 (L) 08/28/2017    HCT 41.5 08/28/2017    MCV 87 08/28/2017     08/28/2017     BMP  Sodium   Date Value Ref Range Status   08/28/2017 138 136 - 145 mmol/L Final     Potassium   Date Value Ref Range Status   08/28/2017 4.0 3.5 - 5.1 mmol/L Final     Chloride   Date Value Ref Range Status   08/28/2017 101 95 - 110 mmol/L Final     CO2   Date Value Ref Range Status   08/28/2017 28 23 - 29 mmol/L Final     BUN, Bld   Date Value Ref Range Status   08/28/2017 22 (H) 6 - 20 mg/dL Final     Creatinine   Date Value Ref Range Status   08/28/2017 1.0 0.5 - 1.4 mg/dL Final     Calcium   Date Value Ref Range Status   08/28/2017 9.1 8.7 - 10.5 mg/dL Final     Anion Gap   Date Value Ref Range Status   08/28/2017 9 8 - 16 mmol/L Final     eGFR if    Date Value Ref Range Status   08/28/2017 >60.0 >60 mL/min/1.73 m^2 Final     eGFR if non    Date  Value Ref Range Status   08/28/2017 >60.0 >60 mL/min/1.73 m^2 Final     Comment:     Calculation used to obtain the estimated glomerular filtration  rate (eGFR) is the CKD-EPI equation. Since race is unknown   in our information system, the eGFR values for   -American and Non--American patients are given   for each creatinine result.       Estimated Creatinine Clearance: 86.7 mL/min (based on Cr of 1).     Assessment:      1. Preop cardiovascular exam    2. Coronary artery disease involving native coronary artery of native heart without angina pectoris    3. PAF (paroxysmal atrial fibrillation)        Plan:   Elevated periop risk of CV events for low risk procedure.  Good functional and exercise capacity.  No chest pain, active arrhythmia and CHF symptoms.  Ok to proceed the scheduled surgery without further cardiac study.  OK to hold Aspirin and Eliquis before the procedure and resume ASAP postop.  Continue Metoprolol and Statin periop.  Avoid periop fluid overloaded.  RTC in 6 months

## 2017-08-30 LAB
ALBUMIN SERPL ELPH-MCNC: 3.39 G/DL
ALPHA1 GLOB SERPL ELPH-MCNC: 0.28 G/DL
ALPHA2 GLOB SERPL ELPH-MCNC: 0.99 G/DL
B-GLOBULIN SERPL ELPH-MCNC: 1.15 G/DL
GAMMA GLOB SERPL ELPH-MCNC: 1.19 G/DL
PROT SERPL-MCNC: 7 G/DL

## 2017-08-30 NOTE — PROGRESS NOTES
Subjective:      Patient ID: Crow Green is a 62 y.o. male.    PCP: Mateo Lambert DO      Crow Green is a pleasant 62 y.o. male presenting to follow up on diabetes mellitus. He has had diabetes for 17 or more years. His last visit in Diabetes Management was 8/7/2017 Since that time he has had moderate improvement in his glycemia. His blood sugar range fasting has been 180-200+ and 2 hour post meal has been not taking, and he has been monitoring 2 times per day as directed. His current concerns are glycemic control.     On admission to the clinic this morning the patient was complaining of lightheadedness dizziness and a numbing sensation along the entire right side of his body.  He states he has had this occasionally in the past month to 6 weeks.  He is currently undergoing treatment for temporal arteritis and is on 60 mg of prednisone daily and is scheduled to have a biopsy tomorrow.  I inquired about patient's aspirin therapy and he told me that was discontinued because of possible surgery tomorrow.  My the end of the evaluation the patient states that he was feeling better.    He denies any hospital admissions, emergency room visits, hypoglycemia, syncope, diaphoresis, chest pain, or dyspnea.    He has lost 6 pounds since last visit. His BMI is 35.44    His blood sugar in the clinic today was:   Lab Results   Component Value Date    POCGLU 267 (A) 07/20/2017       We discussed the American diabetes Association recommendations:  hemoglobin A1c below 7.0%; all diabetics should be on statins unless contraindicated; one aspirin daily unless contraindicated; fasting blood sugar between 80 and 130 mg/dL; postprandial blood sugar below 180 mg/dl; prevention of hypoglycemia, may adjust goals to higher levels if persistent; ACE or ARB therapy if not contraindicated; and maintain in an ideal body weight with BMI below 25.    Crow is compliant most of the time with DM medications.     Crow is  compliant most of the time with lifestyle modifications to include activity and meal planning.       STANDARDS OF CARE:  Eye doctor: Dr. Dias, last exam 6/13/2017.  Dental exam: Recommend regular exams; denies gums bleeding.  Podiatry doctor:     ACTIVITY LEVEL: He exercises rarely.  BLOOD GLUCOSE TESTING: Self-monitoring with   SOCIAL HISTORY: . Lives with spouse. retired no tobacco use    The following results were reviewed with patient.    Lab Results   Component Value Date    WBC 18.79 (H) 08/28/2017    HGB 13.6 (L) 08/28/2017    HCT 41.5 08/28/2017     08/28/2017    CHOL 171 07/07/2017    TRIG 83 07/07/2017    HDL 42 07/07/2017    ALT 34 07/13/2017    AST 27 07/13/2017     08/28/2017    K 4.0 08/28/2017     08/28/2017    CREATININE 1.0 08/28/2017    ESTGFRAFRICA >60.0 08/28/2017    EGFRNONAA >60.0 08/28/2017    BUN 22 (H) 08/28/2017    CO2 28 08/28/2017    TSH 2.374 07/20/2017    PSA 0.83 01/18/2013    INR 1.0 03/15/2017     (H) 08/28/2017       Lab Results   Component Value Date    HGBA1C 9.6 (H) 07/20/2017    HGBA1C 9.6 (H) 06/16/2017    HGBA1C 8.8 (H) 03/16/2017       Lab Results   Component Value Date    GLUTAMICACID 0.01 07/20/2017    CPEPTIDE 4.37 07/20/2017     Lab Results   Component Value Date    TSH 2.374 07/20/2017     Lab Results   Component Value Date    TOTALTESTOST 182 (L) 01/18/2013     Lab Results   Component Value Date    TESTOSTERONE 213 (L) 12/04/2014    TESTOSTERONE 35.9 (L) 12/04/2014     Lab Results   Component Value Date    FERRITIN 505 (H) 01/12/2017     Lab Results   Component Value Date    PTH 95.0 (H) 12/04/2014    CALCIUM 9.1 08/28/2017    PHOS 2.6 (L) 11/16/2016       Review of patient's allergies indicates:  No Known Allergies    Past Medical History:   Diagnosis Date    Arthritis     Asthma     Depression     Diabetes mellitus     Diabetes mellitus, type 2 Diagnosed in 2000    Elevated PSA     Hypertension        Review of Systems    Constitutional: Negative.  Negative for activity change, appetite change, chills, diaphoresis, fatigue, fever and unexpected weight change.   HENT: Negative.  Negative for dental problem, facial swelling, hearing loss, nosebleeds, trouble swallowing and voice change.    Eyes: Negative.  Negative for photophobia, pain, discharge, redness, itching and visual disturbance.   Respiratory: Negative.  Negative for cough, choking, chest tightness, shortness of breath and wheezing.    Cardiovascular: Negative.  Negative for chest pain, palpitations and leg swelling.   Gastrointestinal: Negative.  Negative for abdominal distention, abdominal pain, blood in stool, constipation, diarrhea, nausea and vomiting.   Endocrine: Negative.  Negative for cold intolerance, heat intolerance, polydipsia, polyphagia and polyuria.   Genitourinary: Negative.  Negative for decreased urine volume, difficulty urinating, dysuria, frequency, scrotal swelling, testicular pain and urgency.   Musculoskeletal: Negative.  Negative for arthralgias, back pain, gait problem, joint swelling, myalgias, neck pain and neck stiffness.   Skin: Negative.  Negative for color change, pallor, rash and wound.   Allergic/Immunologic: Negative.  Negative for environmental allergies, food allergies and immunocompromised state.   Neurological: Negative.  Negative for dizziness, tremors, seizures, syncope, facial asymmetry, speech difficulty, weakness, light-headedness, numbness and headaches.   Hematological: Negative.  Negative for adenopathy. Does not bruise/bleed easily.   Psychiatric/Behavioral: Negative.  Negative for agitation, behavioral problems, confusion, decreased concentration, dysphoric mood, hallucinations, self-injury, sleep disturbance and suicidal ideas. The patient is not nervous/anxious and is not hyperactive.       Objective:     Vitals - 1 value per visit 8/29/2017 8/29/2017 8/31/2017   SYSTOLIC 132 139 154   DIASTOLIC 78 79 82   PULSE 88 101 -  "  TEMPERATURE - - -   RESPIRATIONS - - -   SPO2 - - -   Weight (lb) 218.92 218.7 212.96   Weight (kg) 99.3 99.2 96.6   HEIGHT 5' 6" 5' 6" 5' 5"   BODY MASS INDEX 35.33 35.3 35.44   VISIT REPORT - - -   Pain Score  - 8 7   Some recent data might be hidden     Physical Exam   Constitutional: He is oriented to person, place, and time. He appears well-developed and well-nourished. He is cooperative.  Non-toxic appearance. He does not have a sickly appearance. He does not appear ill. No distress. He is not intubated.   HENT:   Head: Normocephalic and atraumatic. Not macrocephalic and not microcephalic. Head is without raccoon's eyes, without Summers's sign, without abrasion, without contusion, without laceration, without right periorbital erythema and without left periorbital erythema. Hair is normal.   Right Ear: External ear normal. No lacerations. No drainage, swelling or tenderness. No foreign bodies. No mastoid tenderness. Tympanic membrane is not injected, not scarred, not perforated, not erythematous, not retracted and not bulging. Tympanic membrane mobility is normal. No middle ear effusion. No hemotympanum. No decreased hearing is noted.   Left Ear: External ear normal. No lacerations. No drainage, swelling or tenderness. No foreign bodies. No mastoid tenderness. Tympanic membrane is not injected, not scarred, not perforated, not erythematous, not retracted and not bulging. Tympanic membrane mobility is normal.  No middle ear effusion. No hemotympanum. No decreased hearing is noted.   Nose: Nose normal.   Mouth/Throat: Oropharynx is clear and moist.   Eyes: EOM and lids are normal. Pupils are equal, round, and reactive to light. Right eye exhibits no chemosis, no discharge, no exudate and no hordeolum. No foreign body present in the right eye. Left eye exhibits no chemosis, no discharge, no exudate and no hordeolum. No foreign body present in the left eye. Right conjunctiva is not injected. Right conjunctiva has " no hemorrhage. Left conjunctiva is not injected. Left conjunctiva has no hemorrhage. No scleral icterus. Right eye exhibits normal extraocular motion and no nystagmus. Left eye exhibits normal extraocular motion and no nystagmus. Right pupil is round and reactive. Left pupil is round and reactive. Pupils are equal.   Neck: Normal range of motion, full passive range of motion without pain and phonation normal. Neck supple. Normal carotid pulses, no hepatojugular reflux and no JVD present. No tracheal tenderness, no spinous process tenderness and no muscular tenderness present. Carotid bruit is not present. No neck rigidity. No tracheal deviation, no edema, no erythema and normal range of motion present. No thyroid mass and no thyromegaly present.   Cardiovascular: Normal rate, regular rhythm, normal heart sounds and intact distal pulses.   No extrasystoles are present. PMI is not displaced.  Exam reveals no gallop, no friction rub and no decreased pulses.    No murmur heard.  Pulses:       Carotid pulses are 2+ on the right side, and 2+ on the left side.       Radial pulses are 2+ on the right side, and 2+ on the left side.        Dorsalis pedis pulses are 2+ on the right side, and 2+ on the left side.        Posterior tibial pulses are 2+ on the right side, and 2+ on the left side.   Pulmonary/Chest: Effort normal and breath sounds normal. No accessory muscle usage or stridor. No apnea, no tachypnea and no bradypnea. He is not intubated. No respiratory distress. He has no decreased breath sounds. He has no wheezes. He has no rhonchi. He has no rales. He exhibits no tenderness.   Abdominal: Soft. Bowel sounds are normal. He exhibits no shifting dullness, no distension, no pulsatile liver, no fluid wave, no abdominal bruit, no ascites, no pulsatile midline mass and no mass. There is no hepatosplenomegaly, splenomegaly or hepatomegaly. There is no tenderness. There is no rigidity, no rebound, no guarding, no CVA  tenderness and no tenderness at McBurney's point. No hernia. Hernia confirmed negative in the ventral area.   Musculoskeletal: Normal range of motion. He exhibits no edema or tenderness.        Right foot: There is normal range of motion and no deformity.        Left foot: There is normal range of motion and no deformity.   Feet:   Right Foot:   Protective Sensation: 6 sites tested. 6 sites sensed.   Skin Integrity: Negative for ulcer, blister, skin breakdown, erythema, warmth, callus or dry skin.   Left Foot:   Protective Sensation: 6 sites tested. 6 sites sensed.   Skin Integrity: Negative for ulcer, blister, skin breakdown, erythema, warmth, callus or dry skin.   Lymphadenopathy:        Head (right side): No submental, no submandibular, no tonsillar, no preauricular, no posterior auricular and no occipital adenopathy present.        Head (left side): No submental, no submandibular, no tonsillar, no preauricular, no posterior auricular and no occipital adenopathy present.     He has no cervical adenopathy.        Right cervical: No superficial cervical, no deep cervical and no posterior cervical adenopathy present.       Left cervical: No superficial cervical, no deep cervical and no posterior cervical adenopathy present.   Neurological: He is alert and oriented to person, place, and time. He has normal reflexes. He displays no atrophy and no tremor. No cranial nerve deficit or sensory deficit. He exhibits normal muscle tone. He displays no seizure activity. Coordination and gait normal.   Reflex Scores:       Bicep reflexes are 2+ on the right side and 2+ on the left side.       Brachioradialis reflexes are 2+ on the right side and 2+ on the left side.       Patellar reflexes are 2+ on the right side and 2+ on the left side.  Skin: Skin is warm and dry. No rash noted. No erythema. No pallor.   Psychiatric: He has a normal mood and affect. His mood appears not anxious. His affect is not angry, not blunt, not  labile and not inappropriate. His speech is not rapid and/or pressured, not delayed, not tangential and not slurred. He is not agitated, not aggressive, not hyperactive, not slowed, not withdrawn, not actively hallucinating and not combative. Thought content is not paranoid and not delusional. Cognition and memory are not impaired. He does not express impulsivity or inappropriate judgment. He does not exhibit a depressed mood. He expresses no homicidal and no suicidal ideation. He expresses no suicidal plans and no homicidal plans. He is communicative. He exhibits normal recent memory and normal remote memory. He is attentive.     Assessment:     1. 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 He has elected the following treatments.     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   - Will need to adjust his insulin dosage to his blood sugar and carbohydrate  - Follow up in 1 month    1.) Patient was instructed to monitor blood glucose twice daily, fasting, and 2 hour post meal; if on Multiple Daily Injections (MDI) he will need to have pre-meal blood glucose as well. Reminded to bring BG meter or record to each visit for review.  2.) Reviewed pathophysiology of diabetes, complications related to the disease, importance of annual dilated eye exam and self daily foot  "examination.  3.) Continue medications as prescribed MDI with Lantus and Novolog. [facility] MyChart or Phone review in 1 week with BG records for adjustment of medication.  4.) Advised patient to continue to follow up with Dietician/CDE as directed.  5.) Discussed activity, benefits, methods, and precautions. Recommended patient start/continue some form of exercise and increase as tolerated to 60 minutes per day to facilitate weight loss and aid in control of BGs. Also reminded patient of WHO recommendation of 10,000 steps daily as a goal.   6.) A1C, TSH, Lipid Panel, CMP/renal panel with eGFR and Micro/Creatinine.  7.) Return to clinic in 1 months for follow up. Advised patient to call clinic with any questions or concerns.    I have reviewed your results and they are still quite high. I would like you to adjust your "Treating to Target". The treatment will be Insulin and your target will be the Fasting and 2 hour post meal blood sugar. It will work in this manner;     1. Goal for Fasting blood sugar is  mg/dl. I realize that you will need time to adjust to the new levels and presently you may feel too low if you are too aggressive now. So go slow and aim to lower your blood sugar to below 200 then 150 then 100 over several months.     2. Goal for 2 hour post meal blood sugar is below 180 mg/dl, here the same rules apply as in #1.     3. You will check your fasting blood sugar daily, if not where we would like it to be over a 3 day period then that evening we will increase the Lantus dose by 5 units. Then repeat the process over the next 3 days. Remember this is a slow process and take our time getting to goal. But, each week should be better than prior weeks. Blood sugars below 70 are unacceptable and should raise a "RED FLAG" where we may have to reduce our dose of insulin.     4. You will check your post meal glucose daily as well. However, each day you will check a different meal, (ie. Monday-breakfast; " "Tuesday- lunch; Wednesday- supper, then repeat). If your post meal glucose is not where we would like, increase pre-meal insulin by 2 units next time. A word of CAUTION: mealtime insulin is dependant on the size and concentration of your meal content. If not consuming a large meal do not take large dose of insulin. Use the reasonable person rule.      5. If you have any questions please do not hesitate to call.     Intensive insulin Therapy with correction factor:     You are on Intensive insulin therapy with Basal and Bolus insulin. Lantus, Levamir or NPH is your Basal insulin and will help maintain your fasting and between meal sugar. Your fast acting or rescue insulin is either Humalog, Novalog or Regular insulin and will control your post meal sugar.      You will Take 50 units of Levemir at 9 pm each night. This will be adjusted up or down depending on your fasting blood sugar before breakfast.     Novolog will follow this pre meal schedule; Correction factor of "2 units per 50 mg/dl" and is based on 30-50 grams of carbohydrates per meal.     If blood sugar is below 70 eat first then check your blood sugar 2 hours later and make correction.  If blood pre-meal sugar is  70 -150 take 20 units of Novolog;  If blood pre-meal sugar is 151-200 take +2 units of Novolog;  If blood pre-meal sugar is 201-250 take +4 units of Novolog;  If blood pre-meal sugar is 251-300 take +6 units of Novolog;  If blood pre-meal sugar is 301-350 take +8 units of Novolog;  If blood pre-meal sugar is 351-400+ take +10 units of Novolog;  Also increase water intake and call for appointment.    A total of 30 minutes was spent in face to face time, of which 50 % was spent in counseling patient on disease process, complications, treatment, and side effects of medications.    The patient was explained the above plan and given opportunity to ask questions.  He understands, chooses and consents to this plan and accepts all the risks, which " include but are not limited to the risks mentioned above.   He understands the alternative of having no testing, interventions or treatments at this time. He left content and without further questions.

## 2017-08-31 ENCOUNTER — OFFICE VISIT (OUTPATIENT)
Dept: DIABETES | Facility: CLINIC | Age: 62
End: 2017-08-31
Payer: MEDICARE

## 2017-08-31 ENCOUNTER — ANESTHESIA EVENT (OUTPATIENT)
Dept: SURGERY | Facility: HOSPITAL | Age: 62
End: 2017-08-31
Payer: MEDICARE

## 2017-08-31 ENCOUNTER — OFFICE VISIT (OUTPATIENT)
Dept: OPHTHALMOLOGY | Facility: CLINIC | Age: 62
End: 2017-08-31
Payer: MEDICARE

## 2017-08-31 VITALS
BODY MASS INDEX: 35.48 KG/M2 | HEIGHT: 65 IN | WEIGHT: 212.94 LBS | DIASTOLIC BLOOD PRESSURE: 82 MMHG | SYSTOLIC BLOOD PRESSURE: 154 MMHG

## 2017-08-31 DIAGNOSIS — E11.3293 MILD NONPROLIFERATIVE DIABETIC RETINOPATHY OF BOTH EYES WITHOUT MACULAR EDEMA ASSOCIATED WITH TYPE 2 DIABETES MELLITUS: ICD-10-CM

## 2017-08-31 DIAGNOSIS — H40.1134 PRIMARY OPEN ANGLE GLAUCOMA OF BOTH EYES, INDETERMINATE STAGE: ICD-10-CM

## 2017-08-31 DIAGNOSIS — H40.1194 INDETERMINATE STAGE CHRONIC OPEN ANGLE GLAUCOMA: Primary | ICD-10-CM

## 2017-08-31 LAB
GLUCOSE SERPL-MCNC: 84 MG/DL (ref 70–110)
INTERPRETATION SERPL IFE-IMP: NORMAL
PATHOLOGIST INTERPRETATION IFE: NORMAL
PATHOLOGIST INTERPRETATION SPE: NORMAL

## 2017-08-31 PROCEDURE — 92014 COMPRE OPH EXAM EST PT 1/>: CPT | Mod: S$GLB,,, | Performed by: OPTOMETRIST

## 2017-08-31 PROCEDURE — 99214 OFFICE O/P EST MOD 30 MIN: CPT | Mod: S$GLB,,, | Performed by: PHYSICIAN ASSISTANT

## 2017-08-31 PROCEDURE — 4010F ACE/ARB THERAPY RXD/TAKEN: CPT | Mod: S$GLB,,, | Performed by: PHYSICIAN ASSISTANT

## 2017-08-31 PROCEDURE — 3046F HEMOGLOBIN A1C LEVEL >9.0%: CPT | Mod: S$GLB,,, | Performed by: PHYSICIAN ASSISTANT

## 2017-08-31 PROCEDURE — 3077F SYST BP >= 140 MM HG: CPT | Mod: S$GLB,,, | Performed by: PHYSICIAN ASSISTANT

## 2017-08-31 PROCEDURE — 3079F DIAST BP 80-89 MM HG: CPT | Mod: S$GLB,,, | Performed by: PHYSICIAN ASSISTANT

## 2017-08-31 PROCEDURE — 99999 PR PBB SHADOW E&M-EST. PATIENT-LVL II: CPT | Mod: PBBFAC,,, | Performed by: OPTOMETRIST

## 2017-08-31 PROCEDURE — 3008F BODY MASS INDEX DOCD: CPT | Mod: S$GLB,,, | Performed by: PHYSICIAN ASSISTANT

## 2017-08-31 PROCEDURE — 99999 PR PBB SHADOW E&M-EST. PATIENT-LVL II: CPT | Mod: PBBFAC,,, | Performed by: PHYSICIAN ASSISTANT

## 2017-08-31 PROCEDURE — 82948 REAGENT STRIP/BLOOD GLUCOSE: CPT | Mod: S$GLB,,, | Performed by: PHYSICIAN ASSISTANT

## 2017-08-31 NOTE — PROGRESS NOTES
HPI     Last [facility] exam 06/13/2017  Chief complaint: floaters, OU  Onset: 2 to 3 months ago  Patient states that he is seeing black spots in both eyes   Spots move with gaze   Patient states that he has been having consistent headaches  Patient is currently taking a steroid and BS have been fluctuating from   the low 100's to 300's   Patient has seen cardiology and has had a stress test and CT scan with   good results  PCP suggested he been seen by an eye doctor    Diabetic= with history of BDR OU  COAG = latanoprost qhs OU    MLC:  He is to have a biopsy for TA tomorrow.  Dr. Jimenez wanted him to   have his eyes check for such.  He has had no amaurosis episodes.    Last edited by SOILA Dias, OD on 9/8/2017  2:36 PM. (History)            Assessment /Plan     For exam results, see Encounter Report.    Indeterminate stage chronic open angle glaucoma    Primary open angle glaucoma of both eyes, indeterminate stage    Mild nonproliferative diabetic retinopathy of both eyes without macular edema associated with type 2 diabetes mellitus    Cataract, nuclear, bilateral    Refractive error      Last full COAG testing was done in March 2017 this year.  No amaurosis to indicate ocular problems with possible TA.  No change in glaucoma status at this visit.  Moderate NS cataracts without complaint = will follow.  OH OK OU otherwise.  RTC 3 months for IOP check.

## 2017-08-31 NOTE — LETTER
September 5, 2017      Gato Jimenez MD  [address] [facility] [address]  [place] [address]           [facility] - Ophthalmology  [address] [place] [address]  Phone: [phone]  Fax: [phone]          Patient: Crow Green   MR Number: [id number]   YOB: 1955   Date of Visit: 8/31/2017       Dear Dr. Gato Jimenez:    Thank you for referring Crow Green to me for evaluation. Attached you will find relevant portions of my assessment and plan of care.    If you have questions, please do not hesitate to call me. I look forward to following Crow Green along with you.    Sincerely,    Joshua Rob  CC:  No Recipients    If you would like to receive this communication electronically, please contact [email or web address] or [phone] to request more information on [employer] access.    For providers and/or their staff who would like to refer a patient to [facility], please contact us through our one-stop-shop provider referral line, [facility], at [phone].    If you feel you have received this communication in error or would no longer like to receive these types of communications, please e-mail [email or web address]

## 2017-08-31 NOTE — PRE-PROCEDURE INSTRUCTIONS
Pre op instructions reviewed with patient per phone:    To confirm, Your surgeon has instructed you:  Surgery is scheduled 9/1/17 at 1200.      Please report to [facility] main lobby by 1030 .      INSTRUCTIONS IMPORTANT!!!  ¨ No smoking after 12 midnight, the night before surgery.  ¨ No solid food after 12 midnight, but you may have clear liquids up until 3 hours prior to surgery.  This includes: grape, cranberry, and apple juice (not orange, and no coffee.)   ¨ OK to brush teeth, but no gum, candy or mints!    ¨ Take only these medicines with a small swallow of water-morning of surgery.  Amlodipine, Cymbalta, Gabapentin, Imdur, Lisinopril, Metoprolol, Pantoprazole, Prednisone, Zoloft, Amiodarone    ____  Do not wear makeup, including mascara.  ____  No powder, lotions or creams to surgical area.  ____  Please remove all jewelry, including piercings and leave at home.  ____  No money or valuables needed. Please leave at home.  ____  Please bring identification and insurance information to hospital.  ____  If going home the same day, arrange for a ride home. You will not be able to   drive if Anesthesia was used.  ____  Children, under 12 years old, must remain in the waiting room with an adult.  They are not allowed in patient areas.  ____  Wear loose fitting clothing. Allow for dressings, bandages.  ____  Stop Aspirin, Ibuprofen, Motrin and Aleve at least 5-7 days before surgery, unless otherwise instructed by your doctor, or the nurse.   You MAY use Tylenol/acetaminophen until day of surgery.  ____  If you take diabetic medication, do not take am of surgery unless instructed by   Doctor.  ____ Stop taking any Fish Oil supplement or any Vitamins that contain Vitamin E at least 5 days prior to surgery.          Bathing Instructions-- The night before surgery and the morning prior to coming to the hospital:   -Do not shave the surgical area.   -Shower and wash your hair and body as  usual with your regular soap and shampoo.   -Rinse your hair and body completely.   -Use one packet of hibiclens to wash the surgical site (using your hand) gently for 5 minutes.  Do not scrub you skin too hard.   -Do not use hibiclens on your head, face, or genitals.   -Do not wash with regular soap after you use the hibiclens.   -Rinse your body thoroughly.   -Dry with clean, soft towel.  Do not use lotion, cream, deodorant, or powders on   the surgical site.    Use antibacterial soap in place of hibiclens if your surgery is on the head, face or genitals.         Surgical Site Infection    Prevention of surgical site infections:     -Keep incisions clean and dry.   -Do not soak/submerge incisions in water until completely healed.   -Do not apply lotions, powders, creams, or deodorants to site.   -Always make sure hands are cleaned with antibacterial soap/ alcohol-based   prior to touching the surgical site.  (This includes doctors, nurses, staff, and yourself.)    Signs and symptoms:   -Redness and pain around the area where you had surgery   -Drainage of cloudy fluid from your surgical wound   -Fever over 100.4  I have read or had read and explained to me, and understand the above information.

## 2017-09-01 ENCOUNTER — ANESTHESIA (OUTPATIENT)
Dept: SURGERY | Facility: HOSPITAL | Age: 62
End: 2017-09-01
Payer: MEDICARE

## 2017-09-01 ENCOUNTER — HOSPITAL ENCOUNTER (OUTPATIENT)
Facility: HOSPITAL | Age: 62
Discharge: HOME | End: 2017-09-01
Attending: SURGERY | Admitting: SURGERY
Payer: MEDICARE

## 2017-09-01 VITALS
SYSTOLIC BLOOD PRESSURE: 159 MMHG | TEMPERATURE: 98 F | WEIGHT: 202 LBS | OXYGEN SATURATION: 94 % | BODY MASS INDEX: 33.66 KG/M2 | RESPIRATION RATE: 16 BRPM | HEIGHT: 65 IN | HEART RATE: 89 BPM | DIASTOLIC BLOOD PRESSURE: 84 MMHG

## 2017-09-01 DIAGNOSIS — R51.9 HEADACHE: ICD-10-CM

## 2017-09-01 DIAGNOSIS — R51.9 HEADACHE, UNSPECIFIED HEADACHE TYPE: ICD-10-CM

## 2017-09-01 DIAGNOSIS — R07.9 CHEST PAIN: ICD-10-CM

## 2017-09-01 LAB
CK MB SERPL-MCNC: 3.9 NG/ML
CK MB SERPL-RTO: 2.9 %
CK SERPL-CCNC: 135 U/L
POCT GLUCOSE: 80 MG/DL (ref 70–110)
TROPONIN I SERPL DL<=0.01 NG/ML-MCNC: 0.09 NG/ML

## 2017-09-01 PROCEDURE — 84484 ASSAY OF TROPONIN QUANT: CPT

## 2017-09-01 PROCEDURE — 88305 TISSUE EXAM BY PATHOLOGIST: CPT | Performed by: PATHOLOGY

## 2017-09-01 PROCEDURE — 71000033 HC RECOVERY, INTIAL HOUR: Performed by: SURGERY

## 2017-09-01 PROCEDURE — 71000015 HC POSTOP RECOV 1ST HR: Performed by: SURGERY

## 2017-09-01 PROCEDURE — 37000009 HC ANESTHESIA EA ADD 15 MINS: Performed by: SURGERY

## 2017-09-01 PROCEDURE — 00352 ANES PX MAJ VSL NCK SMPL LIG: CPT | Performed by: SURGERY

## 2017-09-01 PROCEDURE — 63600175 PHARM REV CODE 636 W HCPCS: Performed by: NURSE ANESTHETIST, CERTIFIED REGISTERED

## 2017-09-01 PROCEDURE — 37000008 HC ANESTHESIA 1ST 15 MINUTES: Performed by: SURGERY

## 2017-09-01 PROCEDURE — 36000707: Performed by: SURGERY

## 2017-09-01 PROCEDURE — 82553 CREATINE MB FRACTION: CPT

## 2017-09-01 PROCEDURE — 25000003 PHARM REV CODE 250: Performed by: SURGERY

## 2017-09-01 PROCEDURE — 88305 TISSUE EXAM BY PATHOLOGIST: CPT | Mod: 26,,, | Performed by: PATHOLOGY

## 2017-09-01 PROCEDURE — 25000003 PHARM REV CODE 250: Performed by: NURSE ANESTHETIST, CERTIFIED REGISTERED

## 2017-09-01 PROCEDURE — 63600175 PHARM REV CODE 636 W HCPCS: Performed by: SURGERY

## 2017-09-01 PROCEDURE — 37609 LIGATION/BX TEMPORAL ARTERY: CPT | Mod: LT,,, | Performed by: SURGERY

## 2017-09-01 PROCEDURE — 63600175 PHARM REV CODE 636 W HCPCS: Performed by: ANESTHESIOLOGY

## 2017-09-01 PROCEDURE — 93010 ELECTROCARDIOGRAM REPORT: CPT | Mod: ,,, | Performed by: INTERNAL MEDICINE

## 2017-09-01 PROCEDURE — 93005 ELECTROCARDIOGRAM TRACING: CPT | Mod: 59

## 2017-09-01 PROCEDURE — 71000039 HC RECOVERY, EACH ADD'L HOUR: Performed by: SURGERY

## 2017-09-01 PROCEDURE — 36000706: Performed by: SURGERY

## 2017-09-01 PROCEDURE — 25000003 PHARM REV CODE 250: Performed by: ANESTHESIOLOGY

## 2017-09-01 RX ORDER — MORPHINE SULFATE 10 MG/ML
2 INJECTION INTRAMUSCULAR; INTRAVENOUS; SUBCUTANEOUS EVERY 5 MIN PRN
Status: DISCONTINUED | OUTPATIENT
Start: 2017-09-01 | End: 2017-09-01 | Stop reason: HOSPADM

## 2017-09-01 RX ORDER — SODIUM CHLORIDE, SODIUM LACTATE, POTASSIUM CHLORIDE, CALCIUM CHLORIDE 600; 310; 30; 20 MG/100ML; MG/100ML; MG/100ML; MG/100ML
INJECTION, SOLUTION INTRAVENOUS CONTINUOUS
Status: DISCONTINUED | OUTPATIENT
Start: 2017-09-01 | End: 2017-09-01 | Stop reason: HOSPADM

## 2017-09-01 RX ORDER — PROPOFOL 10 MG/ML
VIAL (ML) INTRAVENOUS
Status: DISCONTINUED | OUTPATIENT
Start: 2017-09-01 | End: 2017-09-01

## 2017-09-01 RX ORDER — FENTANYL CITRATE 50 UG/ML
INJECTION, SOLUTION INTRAMUSCULAR; INTRAVENOUS
Status: DISCONTINUED | OUTPATIENT
Start: 2017-09-01 | End: 2017-09-01

## 2017-09-01 RX ORDER — LIDOCAINE HCL/PF 100 MG/5ML
SYRINGE (ML) INTRAVENOUS
Status: DISCONTINUED | OUTPATIENT
Start: 2017-09-01 | End: 2017-09-01

## 2017-09-01 RX ORDER — HYDROCODONE BITARTRATE AND ACETAMINOPHEN 10; 325 MG/1; MG/1
1 TABLET ORAL EVERY 4 HOURS PRN
Status: DISCONTINUED | OUTPATIENT
Start: 2017-09-01 | End: 2017-09-01 | Stop reason: HOSPADM

## 2017-09-01 RX ORDER — ROCURONIUM BROMIDE 10 MG/ML
INJECTION, SOLUTION INTRAVENOUS
Status: DISCONTINUED | OUTPATIENT
Start: 2017-09-01 | End: 2017-09-01

## 2017-09-01 RX ORDER — HYDROCODONE BITARTRATE AND ACETAMINOPHEN 5; 325 MG/1; MG/1
1 TABLET ORAL EVERY 6 HOURS PRN
Qty: 10 TABLET | Refills: 0 | Status: SHIPPED | OUTPATIENT
Start: 2017-09-01 | End: 2018-01-11

## 2017-09-01 RX ORDER — MEPERIDINE HYDROCHLORIDE 50 MG/ML
12.5 INJECTION INTRAMUSCULAR; INTRAVENOUS; SUBCUTANEOUS ONCE AS NEEDED
Status: DISCONTINUED | OUTPATIENT
Start: 2017-09-01 | End: 2017-09-01 | Stop reason: HOSPADM

## 2017-09-01 RX ORDER — ONDANSETRON 2 MG/ML
4 INJECTION INTRAMUSCULAR; INTRAVENOUS EVERY 12 HOURS PRN
Status: DISCONTINUED | OUTPATIENT
Start: 2017-09-01 | End: 2017-09-01 | Stop reason: HOSPADM

## 2017-09-01 RX ORDER — OXYCODONE HYDROCHLORIDE 5 MG/1
5 TABLET ORAL
Status: DISCONTINUED | OUTPATIENT
Start: 2017-09-01 | End: 2017-09-01 | Stop reason: HOSPADM

## 2017-09-01 RX ORDER — SODIUM CHLORIDE 0.9 % (FLUSH) 0.9 %
3 SYRINGE (ML) INJECTION
Status: DISCONTINUED | OUTPATIENT
Start: 2017-09-01 | End: 2017-09-01 | Stop reason: HOSPADM

## 2017-09-01 RX ORDER — MIDAZOLAM HYDROCHLORIDE 1 MG/ML
INJECTION, SOLUTION INTRAMUSCULAR; INTRAVENOUS
Status: DISCONTINUED | OUTPATIENT
Start: 2017-09-01 | End: 2017-09-01

## 2017-09-01 RX ORDER — METOCLOPRAMIDE HYDROCHLORIDE 5 MG/ML
10 INJECTION INTRAMUSCULAR; INTRAVENOUS EVERY 10 MIN PRN
Status: DISCONTINUED | OUTPATIENT
Start: 2017-09-01 | End: 2017-09-01 | Stop reason: HOSPADM

## 2017-09-01 RX ORDER — CEFAZOLIN SODIUM 2 G/50ML
2 SOLUTION INTRAVENOUS
Status: COMPLETED | OUTPATIENT
Start: 2017-09-01 | End: 2017-09-01

## 2017-09-01 RX ORDER — LIDOCAINE HYDROCHLORIDE 10 MG/ML
INJECTION INFILTRATION; PERINEURAL
Status: DISCONTINUED | OUTPATIENT
Start: 2017-09-01 | End: 2017-09-01 | Stop reason: HOSPADM

## 2017-09-01 RX ORDER — HYDROCODONE BITARTRATE AND ACETAMINOPHEN 5; 325 MG/1; MG/1
1 TABLET ORAL EVERY 4 HOURS PRN
Status: DISCONTINUED | OUTPATIENT
Start: 2017-09-01 | End: 2017-09-01 | Stop reason: HOSPADM

## 2017-09-01 RX ORDER — ONDANSETRON 2 MG/ML
INJECTION INTRAMUSCULAR; INTRAVENOUS
Status: DISCONTINUED | OUTPATIENT
Start: 2017-09-01 | End: 2017-09-01

## 2017-09-01 RX ORDER — SODIUM CHLORIDE 0.9 % (FLUSH) 0.9 %
3 SYRINGE (ML) INJECTION EVERY 8 HOURS
Status: DISCONTINUED | OUTPATIENT
Start: 2017-09-01 | End: 2017-09-01 | Stop reason: HOSPADM

## 2017-09-01 RX ORDER — ACETAMINOPHEN 10 MG/ML
1000 INJECTION, SOLUTION INTRAVENOUS ONCE
Status: COMPLETED | OUTPATIENT
Start: 2017-09-01 | End: 2017-09-01

## 2017-09-01 RX ORDER — SODIUM CHLORIDE 9 MG/ML
INJECTION, SOLUTION INTRAVENOUS CONTINUOUS
Status: DISCONTINUED | OUTPATIENT
Start: 2017-09-01 | End: 2017-09-01 | Stop reason: HOSPADM

## 2017-09-01 RX ORDER — BUPIVACAINE HYDROCHLORIDE 2.5 MG/ML
INJECTION, SOLUTION EPIDURAL; INFILTRATION; INTRACAUDAL
Status: DISCONTINUED | OUTPATIENT
Start: 2017-09-01 | End: 2017-09-01 | Stop reason: HOSPADM

## 2017-09-01 RX ADMIN — PROPOFOL 120 MG: 10 INJECTION, EMULSION INTRAVENOUS at 01:09

## 2017-09-01 RX ADMIN — ROCURONIUM BROMIDE 50 MG: 10 INJECTION, SOLUTION INTRAVENOUS at 01:09

## 2017-09-01 RX ADMIN — CEFAZOLIN SODIUM 2 G: 2 SOLUTION INTRAVENOUS at 01:09

## 2017-09-01 RX ADMIN — LIDOCAINE HYDROCHLORIDE 80 MG: 20 INJECTION, SOLUTION INTRAVENOUS at 01:09

## 2017-09-01 RX ADMIN — ONDANSETRON 4 MG: 2 INJECTION, SOLUTION INTRAMUSCULAR; INTRAVENOUS at 03:09

## 2017-09-01 RX ADMIN — ACETAMINOPHEN 1000 MG: 10 INJECTION, SOLUTION INTRAVENOUS at 04:09

## 2017-09-01 RX ADMIN — MIDAZOLAM HYDROCHLORIDE 2 MG: 1 INJECTION, SOLUTION INTRAMUSCULAR; INTRAVENOUS at 01:09

## 2017-09-01 RX ADMIN — SODIUM CHLORIDE, SODIUM LACTATE, POTASSIUM CHLORIDE, AND CALCIUM CHLORIDE: 600; 310; 30; 20 INJECTION, SOLUTION INTRAVENOUS at 03:09

## 2017-09-01 RX ADMIN — SODIUM CHLORIDE, SODIUM LACTATE, POTASSIUM CHLORIDE, AND CALCIUM CHLORIDE: 600; 310; 30; 20 INJECTION, SOLUTION INTRAVENOUS at 01:09

## 2017-09-01 RX ADMIN — FENTANYL CITRATE 100 MCG: 50 INJECTION, SOLUTION INTRAMUSCULAR; INTRAVENOUS at 01:09

## 2017-09-01 NOTE — ANESTHESIA PREPROCEDURE EVALUATION
09/01/2017  Crow Green is a 62 y.o., male.    Anesthesia Evaluation    I have reviewed the Patient Summary Reports.    I have reviewed the Nursing Notes.   I have reviewed the Medications.     Review of Systems  Anesthesia Hx:  No problems with previous Anesthesia    Cardiovascular:   Hypertension CAD   Angina (Pt complained of chest pain this morning. Resolved without intervention. Has had pain before. Usually resolves with rest. Had to take NTG 1/2017.) CHF ECG has been reviewed. EF 38% 7/2017   Pulmonary:   Asthma Sleep Apnea    Hepatic/GI:   GERD    Neurological:   Neuromuscular Disease, Headaches    Endocrine:   Diabetes    Psych:   Psychiatric History          Physical Exam  General:  Well nourished    Airway/Jaw/Neck:  Airway Findings: Mouth Opening: Normal Tongue: Normal  General Airway Assessment: Adult  Mallampati: II       Chest/Lungs:  Chest/Lungs Findings: Clear to auscultation, Normal Respiratory Rate     Heart/Vascular:  Heart Findings: Rate: Normal  Rhythm: Regular Rhythm  Sounds: Normal        Mental Status:  Mental Status Findings:  Cooperative, Alert and Oriented         Anesthesia Plan  Type of Anesthesia, risks & benefits discussed:  Anesthesia Type:  general, MAC  Patient's Preference:   Intra-op Monitoring Plan:   Intra-op Monitoring Plan Comments:   Post Op Pain Control Plan: multimodal analgesia  Post Op Pain Control Plan Comments:   Induction:   IV  Beta Blocker:  Patient is on a Beta-Blocker and has received one dose within the past 24 hours (No further documentation required).       Informed Consent: Patient understands risks and agrees with Anesthesia plan.  Questions answered. Anesthesia consent signed with patient.  ASA Score: 3     Day of Surgery Review of History & Physical: I have interviewed and examined the patient. I have reviewed the patient's H&P dated:  There  are no significant changes.          Ready For Surgery From Anesthesia Perspective.

## 2017-09-01 NOTE — ANESTHESIA POSTPROCEDURE EVALUATION
"Anesthesia Post Evaluation    Patient: Crow Green    Procedure(s) Performed: Procedure(s) (LRB):  BIOPSY-ARTERY-TEMPORAL (Left)    Final Anesthesia Type: general  Patient location during evaluation: PACU  Patient participation: Yes- Able to Participate  Level of consciousness: awake  Post-procedure vital signs: reviewed and stable  Pain management: adequate  Airway patency: patent  PONV status at discharge: No PONV  Anesthetic complications: no      Cardiovascular status: blood pressure returned to baseline  Respiratory status: unassisted, spontaneous ventilation and room air  Hydration status: euvolemic  Follow-up not needed.        Visit Vitals  BP (!) 159/84   Pulse 89   Temp 36.6 °C (97.8 °F) (Temporal)   Resp 16   Ht 5' 5" (1.651 m)   Wt 91.6 kg (202 lb)   SpO2 (!) 94%   BMI 33.61 kg/m²       Pain/Raymond Score: Pain Assessment Performed: Yes (9/1/2017  4:45 PM)  Presence of Pain: complains of pain/discomfort (9/1/2017  3:33 PM)  Pain Rating Prior to Med Admin: 4 (9/1/2017  4:45 PM)  Raymond Score: 9 (9/1/2017  4:45 PM)      "

## 2017-09-01 NOTE — TRANSFER OF CARE
"Anesthesia Transfer of Care Note    Patient: Crow Green    Procedure(s) Performed: Procedure(s) (LRB):  BIOPSY-ARTERY-TEMPORAL (Left)    Patient location: PACU    Anesthesia Type: general    Transport from OR: Transported from OR on room air with adequate spontaneous ventilation    Post pain: adequate analgesia    Post assessment: no apparent anesthetic complications    Post vital signs: stable    Level of consciousness: awake    Nausea/Vomiting: no nausea/vomiting    Complications: none    Transfer of care protocol was followed      Last vitals:   Visit Vitals  /81 (BP Location: Right arm, Patient Position: Sitting)   Pulse 99   Temp 37.1 °C (98.8 °F) (Skin)   Resp (!) 95   Ht 5' 5" (1.651 m)   Wt 91.6 kg (202 lb)   SpO2 95%   BMI 33.61 kg/m²     "

## 2017-09-01 NOTE — INTERVAL H&P NOTE
The patient has been examined and the H&P has been reviewed:    I concur with the findings and no changes have occurred since H&P was written.    Anesthesia/Surgery risks, benefits and alternative options discussed and understood by patient/family.          Active Hospital Problems    Diagnosis  POA    Headache [R51]  Yes      Resolved Hospital Problems    Diagnosis Date Resolved POA   No resolved problems to display.

## 2017-09-01 NOTE — ANESTHESIA RELEASE NOTE
"Anesthesia Release from PACU Note    Patient: Crow Green    Procedure(s) Performed: Procedure(s) (LRB):  BIOPSY-ARTERY-TEMPORAL (Left)    Anesthesia type: general    Post pain: Adequate analgesia    Post assessment: no apparent anesthetic complications    Last Vitals:   Visit Vitals  /81 (BP Location: Right arm, Patient Position: Sitting)   Pulse 99   Temp 37.1 °C (98.8 °F) (Skin)   Resp (!) 95   Ht 5' 5" (1.651 m)   Wt 91.6 kg (202 lb)   SpO2 95%   BMI 33.61 kg/m²       Post vital signs: stable    Level of consciousness: awake    Nausea/Vomiting: no nausea/no vomiting    Complications: none    Airway Patency: patent    Respiratory: unassisted    Cardiovascular: stable and blood pressure at baseline    Hydration: euvolemic  "

## 2017-09-01 NOTE — PLAN OF CARE
Pt ambulated without difficulty to preop. Pt states he uses cane prn to ambulate due to right hip pain.   Pt c/o chest pain at 1145am. States midsternal pressure 7/10. States he had chest pain yesterday and it resolved prior to nitroglycerine. States chest pain this morning and again resolved. Now chest pain is back and has not resolved. States he has reflux and took medicine yesterday, but this is not the same kind of pain.  Dr hoffman notified and stat ekg and cardiac enzymes ordered and done. Dr montaño notified. Will continue to monitor.

## 2017-09-01 NOTE — BRIEF OP NOTE
[facility]  Brief Operative Note     SUMMARY     Surgery Date: 9/1/2017     Surgeon(s) and Role:     * Torin Bishop MD - Primary    Assisting Surgeon: None    Pre-op Diagnosis:  Headache, unspecified headache type [R51]    Post-op Diagnosis:  Post-Op Diagnosis Codes:     * Headache, unspecified headache type [R51]    Procedure(s) (LRB):  BIOPSY-ARTERY-TEMPORAL (Left)    Anesthesia: General    Description of the findings of the procedure: Left temporal artery biopsy    Findings/Key Components: Left temporal artery    Estimated Blood Loss: * No values recorded between 9/1/2017  2:14 PM and 9/1/2017  3:23 PM *         Specimens:   Specimen (12h ago through future)    Start     Ordered    09/01/17 1515  Specimen to Pathology - Surgery  Once     Comments:  1.) Left Temporal ArteryDX: Possible Temporal Arteritis      09/01/17 1516          Discharge Note    SUMMARY     Admit Date: 9/1/2017    Discharge Date and Time:  09/01/2017 3:23 PM    Hospital Course the patient underwent left temporal artery biopsy and was discharged postoperatively    Final Diagnosis: Post-Op Diagnosis Codes:     * Headache, unspecified headache type [R51]    Disposition: Home or Self Care    Follow Up/Patient Instructions:     Medications:  Reconciled Home Medications:   Current Discharge Medication List      START taking these medications    Details   hydrocodone-acetaminophen 5-325mg (NORCO) 5-325 mg per tablet Take 1 tablet by mouth every 6 (six) hours as needed for Pain.  Qty: 10 tablet, Refills: 0         CONTINUE these medications which have NOT CHANGED    Details   amiodarone (PACERONE) 200 MG Tab Take 1 tablet (200 mg total) by mouth once daily.  Qty: 30 tablet, Refills: 11    Associated Diagnoses: PAF (paroxysmal atrial fibrillation)      amlodipine (NORVASC) 10 MG tablet Take 1 tablet (10 mg total) by mouth once daily.  Qty: 90 tablet, Refills: 3    Associated Diagnoses: Hypertension associated with diabetes       atorvastatin (LIPITOR) 40 MG tablet Take 1 tablet (40 mg total) by mouth every evening.  Qty: 90 tablet, Refills: 3    Associated Diagnoses: Hyperlipidemia associated with type 2 diabetes mellitus      duloxetine (CYMBALTA) 30 MG capsule       gabapentin (NEURONTIN) 300 MG capsule Take 1 capsule (300 mg total) by mouth 3 (three) times daily.  Qty: 270 capsule, Refills: 3    Associated Diagnoses: Diabetic peripheral neuropathy associated with type 2 diabetes mellitus      insulin glargine (LANTUS SOLOSTAR) 100 unit/mL (3 mL) InPn pen Inject 50 Units into the skin every evening.  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  Qty: 30 tablet, Refills: 0      latanoprost 0.005 % ophthalmic solution Place 1 drop into both eyes every evening.  Qty: 1 Bottle, Refills: 4    Associated Diagnoses: Chronic open angle glaucoma, stage unspecified      lisinopril (PRINIVIL,ZESTRIL) 5 MG tablet Take 1 tablet (5 mg total) by mouth once daily.  Qty: 90 tablet, Refills: 2      metformin (GLUCOPHAGE) 1000 MG tablet Take 1 tablet (1,000 mg total) by mouth 2 (two) times daily with meals.  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  Qty: 30 tablet, Refills: 11    Associated  "Diagnoses: Gastroesophageal reflux disease, esophagitis presence not specified      predniSONE (DELTASONE) 20 MG tablet Take 3 tablets (60 mg total) by mouth once daily.  Qty: 42 tablet, Refills: 0    Associated Diagnoses: GCA (giant cell arteritis)      sertraline (ZOLOFT) 25 MG tablet       trazodone (DESYREL) 150 MG tablet       ALCOHOL ANTISEPTIC PADS (ALCOHOL PREP PADS TOP)       ammonium lactate 12 % Crea Apply 1 Act topically 2 (two) times daily.  Qty: 1 Tube, Refills: 3    Associated Diagnoses: Xerosis of skin      apixaban 5 mg Tab Take 1 tablet (5 mg total) by mouth 2 (two) times daily.  Qty: 60 tablet, Refills: 1      aspirin (ECOTRIN) 81 MG EC tablet Take 1 tablet (81 mg total) by mouth once daily.  Refills: 0      blood sugar diagnostic Strp 1 strip by Misc.(Non-Drug; Combo Route) route 6 (six) times daily.  Qty: 160 strip, Refills: 11    Comments: Patient uses Contour Next strips. He is on intensive insulin therapy and it is medically necessary to check before meals and 2 hour hours after meals as directed.      fluticasone-salmeterol 100-50 mcg/dose (ADVAIR DISKUS) 100-50 mcg/dose diskus inhaler Inhale 1 puff into the lungs 2 (two) times daily. Controller  Qty: 180 each, Refills: 3    Associated Diagnoses: Moderate asthma      insulin syringe-needle U-100 1 mL 31 gauge x 5/16 Syrg       nitroGLYCERIN (NITROSTAT) 0.3 MG SL tablet Place 1 tablet (0.3 mg total) under the tongue every 5 (five) minutes as needed for Chest pain. No more than 3 tablets in one day.  Qty: 30 tablet, Refills: 1    Associated Diagnoses: Angina at rest      pen needle, diabetic (BD INSULIN PEN NEEDLE UF SHORT) 31 gauge x 5/16" Ndle Inject 1 each into the skin 3 (three) times daily.  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 Controller  Qty: 90 capsule, Refills: 3    Associated Diagnoses: Moderate asthma      TRUE METRIX AIR GLUCOSE METER kit TEST FOUR TIMES DAILY BEFORE MEALS  AND EVERY NIGHT  Qty: 1 each, Refills: 0      VENTOLIN HFA 90 mcg/actuation inhaler              Discharge Procedure Orders  Diet general     Activity as tolerated     Shower on day dressing removed (No bath)     Call MD for:  temperature >100.4     Call MD for:  persistent nausea and vomiting     Call MD for:  severe uncontrolled pain     Call MD for:  difficulty breathing, headache or visual disturbances     Call MD for:  redness, tenderness, or signs of infection (pain, swelling, redness, odor or green/yellow discharge around incision site)     Call MD for:  hives     Call MD for:  persistent dizziness or light-headedness     Call MD for:  extreme fatigue     Remove dressing in 48 hours       Follow-up Information     Torin Bishop MD In 2 weeks.    Specialty:  General Surgery  Contact information:  [address] [place] [address] DR Shakira MARY [id number] [phone]

## 2017-09-01 NOTE — DISCHARGE INSTRUCTIONS
Incision Care  Remember: Follow-up visits allow your doctor to make sure your incision is healing well. Be sure to keep your appointments.   Stitches (sutures), surgical staples, special strips of surgical tape called Steri-Strips, or surgical skin glue may be used to close incisions. They also help stop bleeding and speed healing. To help your incision heal, follow the tips on this handout.  Home care     Steri-Strips   · Always wash your hands before touching your incision.  · Keep your incision clean and dry.  · Avoid doing things that could cause dirt or sweat to get on your incision.  · Dont pick at scabs. They help protect the wound.  · Keep your incision out of water.  · Take a sponge bath to avoid getting your incision wet, unless your healthcare provider tells you otherwise.  · Ask your provider when can you take a shower or bathe.  · Ask your provider about the best way to keep your incision dry when bathing or showering.  · Pat sutures dry if they get wet. Dont rub.  · Leave the bandage (dressing) in place until you are told to remove it or change it. Change it only as directed, using clean hands.  · After the first 12 hours, change your dressing every 24 hours, or as directed by your healthcare provider.  · Change your dressing if it gets wet or soiled.  Care for specific closures  Follow these guidelines unless your healthcare provider tells you otherwise:  · Sutures or staples. Once you no longer need to keep these dry, clean the wound daily. First remove the bandage using clean hands. Then wash the area gently with soap and warm water. Use a wet cotton swab to loosen and remove any blood or crust that forms. After cleaning, put a thin layer of antibiotic ointment on. Then put on a new bandage.  · Skin glue. Dont put liquid, ointment, or cream on your wound while the glue is in place. Avoid activities that cause heavy sweating. Protect the wound from sunlight. Do not scratch, rub, or pick at the  glue. Do not put tape directly over the glue. The glue should peel off within 5 to 10 days.  · Surgical tape. Keep the area dry. If it gets wet, blot the area dry with a clean towel. Surgical tape usually falls off within 7 to 10 days. If it has not fallen off after 10 days, contact your healthcare provider before taking it off yourself. If you are told to remove the tape, put mineral oil or petroleum jelly on a cotton ball. Gently rub the tape until it is removed.  Changing your dressing     Wash your hands before changing a dressing.    Leave the dressing (bandage) in place until you are told to remove it or change it. Follow the instructions below unless told otherwise by your healthcare provider.  · Always wash your hands before changing your dressing.  · After the first 48 hours the incision wound usually will have closed. At this point, leave the incision uncovered and open to the air. If the incision has not closed keep it covered.  · Cover your incision only if your clothing is rubbing it or causing irritation.  · Change your dressing if it gets wet or soiled.  Follow-up care  Follow up with your healthcare provider to ask how long sutures or staples should be left in place. Be sure to return for suture or staple removal as directed. If dissolving stitches were used in your mouth, these will not need to be removed. They should fall out or dissolve on their own.  If tape closures were used, remove them yourself when your provider recommends if they have not fallen off on their own. If skin glue was used, the glue will wear off by itself.  When to seek medical care  Call your healthcare provider if you have any of the following:  · More pain, redness, swelling, bleeding, or foul-smelling discharge around the incision area  · Fever of 100.4°F (38ºC) or higher or as directed by your healthcare provider  · Shaking chills  · Vomiting or nausea that doesn't go away  · Numbness, coldness, or tingling around the  incision area, or changes in skin color  · Opening of the sutures or wound  · Stitches or staples come apart or fall out or surgical tape falls off before 7 days or as directed by your healthcare provider   Date Last Reviewed: 10/16/2014  © 5434-6372 [employer]. [address] [place] [address]. All rights reserved. This information is not intended as a substitute for professional medical care. Always follow your healthcare professional's instructions.        Acetaminophen; Hydrocodone tablets or capsules  What is this medicine?  ACETAMINOPHEN; HYDROCODONE (a set a MARY james fen; gilmer droe KOE done) is a pain reliever. It is used to treat moderate to severe pain.  How should I use this medicine?  Take this medicine by mouth with a glass of water. Follow the directions on the prescription label. You can take it with or without food. If it upsets your stomach, take it with food. Do not take your medicine more often than directed.  A special MedGuide will be given to you by the pharmacist with each prescription and refill. Be sure to read this information carefully each time.  Talk to your pediatrician regarding the use of this medicine in children. Special care may be needed.  What side effects may I notice from receiving this medicine?  Side effects that you should report to your doctor or health care professional as soon as possible:  · allergic reactions like skin rash, itching or hives, swelling of the face, lips, or tongue  · breathing problems  · confusion  · redness, blistering, peeling or loosening of the skin, including inside the mouth  · signs and symptoms of low blood pressure like dizziness; feeling faint or lightheaded, falls; unusually weak or tired  · trouble passing urine or change in the amount of urine  · yellowing of the eyes or skin  Side effects that usually do not require medical attention (report to your doctor or health care professional if they continue or are  bothersome):  · constipation  · dry mouth  · nausea, vomiting  · tiredness  What may interact with this medicine?  This medicine may interact with the following medications:  · alcohol  · antiviral medicines for HIV or AIDS  · atropine  · antihistamines for allergy, cough and cold  · certain antibiotics like erythromycin, clarithromycin  · certain medicines for anxiety or sleep  · certain medicines for bladder problems like oxybutynin, tolterodine  · certain medicines for depression like amitriptyline, fluoxetine, sertraline  · certain medicines for fungal infections like ketoconazole and itraconazole  · certain medicines for Parkinson's disease like benztropine, trihexyphenidyl  · certain medicines for seizures like carbamazepine, phenobarbital, phenytoin, primidone  · certain medicines for stomach problems like dicyclomine, hyoscyamine  · certain medicines for travel sickness like scopolamine  · general anesthetics like halothane, isoflurane, methoxyflurane, propofol  · ipratropium  · local anesthetics like lidocaine, pramoxine, tetracaine  · MAOIs like Carbex, Eldepryl, Marplan, Nardil, and Parnate  · medicines that relax muscles for surgery  · other medicines with acetaminophen  · other narcotic medicines for pain or cough  · phenothiazines like chlorpromazine, mesoridazine, prochlorperazine, thioridazine  · rifampin  What if I miss a dose?  If you miss a dose, take it as soon as you can. If it is almost time for your next dose, take only that dose. Do not take double or extra doses.  Where should I keep my medicine?  Keep out of the reach of children. This medicine can be abused. Keep your medicine in a safe place to protect it from theft. Do not share this medicine with anyone. Selling or giving away this medicine is dangerous and against the law.  This medicine may cause accidental overdose and death if it taken by other adults, children, or pets. Mix any unused medicine with a substance like cat litter or  coffee grounds. Then throw the medicine away in a sealed container like a sealed bag or a coffee can with a lid. Do not use the medicine after the expiration date.  Store at room temperature between 15 and 30 degrees C (59 and 86 degrees F).  What should I tell my health care provider before I take this medicine?  They need to know if you have any of these conditions:  · brain tumor  · Crohn's disease, inflammatory bowel disease, or ulcerative colitis  · drug abuse or addiction  · head injury  · heart or circulation problems  · if you often drink alcohol  · kidney disease or problems going to the bathroom  · liver disease  · lung disease, asthma, or breathing problems  · an unusual or allergic reaction to acetaminophen, hydrocodone, other opioid analgesics, other medicines, foods, dyes, or preservatives  · pregnant or trying to get pregnant  · breast-feeding  What should I watch for while using this medicine?  Tell your doctor or health care professional if your pain does not go away, if it gets worse, or if you have new or a different type of pain. You may develop tolerance to the medicine. Tolerance means that you will need a higher dose of the medicine for pain relief. Tolerance is normal and is expected if you take the medicine for a long time.  Do not suddenly stop taking your medicine because you may develop a severe reaction. Your body becomes used to the medicine. This does NOT mean you are addicted. Addiction is a behavior related to getting and using a drug for a non-medical reason. If you have pain, you have a medical reason to take pain medicine. Your doctor will tell you how much medicine to take. If your doctor wants you to stop the medicine, the dose will be slowly lowered over time to avoid any side effects.  There are different types of narcotic medicines (opiates). If you take more than one type at the same time or if you are taking another medicine that also causes drowsiness, you may have more  side effects. Give your health care provider a list of all medicines you use. Your doctor will tell you how much medicine to take. Do not take more medicine than directed. Call emergency for help if you have problems breathing or unusual sleepiness.  Do not take other medicines that contain acetaminophen with this medicine. Always read labels carefully. If you have questions, ask your doctor or pharmacist.  If you take too much acetaminophen get medical help right away. Too much acetaminophen can be very dangerous and cause liver damage. Even if you do not have symptoms, it is important to get help right away.  You may get drowsy or dizzy. Do not drive, use machinery, or do anything that needs mental alertness until you know how this medicine affects you. Do not stand or sit up quickly, especially if you are an older patient. This reduces the risk of dizzy or fainting spells. Alcohol may interfere with the effect of this medicine. Avoid alcoholic drinks.  The medicine will cause constipation. Try to have a bowel movement at least every 2 to 3 days. If you do not have a bowel movement for 3 days, call your doctor or health care professional.  Your mouth may get dry. Chewing sugarless gum or sucking hard candy, and drinking plenty of water may help. Contact your doctor if the problem does not go away or is severe.  Date Last Reviewed:   NOTE:This sheet is a summary. It may not cover all possible information. If you have questions about this medicine, talk to your doctor, pharmacist, or health care provider. Copyright© 2016 [employer]        General Information:    1.  Do not drink alcoholic beverages including beer for 24 hours or as long as you are on pain medication..  2.  Do not drive a motor vehicle, operate machinery or power tools, or signs legal papers for 24 hours or as long as you are on pain medication.   3.  You may experience light-headedness, dizziness, and sleepiness following surgery. Please do not  stay alone. A responsible adult should be with you for this 24 hour period.  4.  Go home and rest.    5. Progress slowly to a normal diet unless instructed.  Otherwise, begin with liquids such as soft drinks, then soup and crackers working up to solid foods. Drink plenty of nonalcoholic fluids.  6.  Certain anesthetics and pain medications produce nausea and vomiting in certain       individuals. If nausea becomes a problem at home, call you doctor.    7. Several times every hour while you are awake, take 2-3 deep breaths and cough. If you had stomach surgery hold a pillow or rolled towel firmly against your stomach before you cough. This will help with any pain the cough might cause.    8. Several times every hour while you are awake, pump and flex your feet 5-6 times and do foot circles. This will help prevent blood clots.    9.Call your doctor for severe pain, bleeding, fever, or signs or symptoms of infection (pain, swelling, redness, foul odor, drainage).    10.You can contact your doctor anytime by [phone] for the [facility] (at [facility]) or [phone] for the [facility] on [facility].   [email or web address] is another way to contact your doctor if you are an active participant online with [facility].

## 2017-09-01 NOTE — OP NOTE
[facility]  Surgery Department  Operative Note    SUMMARY     Date of Procedure: 9/1/2017     Procedure: Procedure(s) (LRB):  BIOPSY-ARTERY-TEMPORAL (Left)     Surgeon(s) and Role:     * Torin Bishop MD - Primary    Asst.:  JEWELS Shah    Pre-Operative Diagnosis: Headache, unspecified headache type [R51]    Post-Operative Diagnosis: Post-Op Diagnosis Codes:     * Headache, unspecified headache type [R51]    Anesthesia: General    Technical Procedures Used: Left temporal artery biopsy    Description of the Findings of the Procedure: Left temporal artery    Significant Surgical Tasks Conducted by the Assistant(s), if Applicable: Retraction/exposure, suturing    Complications: No    Estimated Blood Loss (EBL): 5 cc           Implants: * No implants in log *    Specimens:   Specimen (12h ago through future)    Start     Ordered    09/01/17 1515  Specimen to Pathology - Surgery  Once     Comments:  1.) Left Temporal ArteryDX: Possible Temporal Arteritis      09/01/17 1516                  Condition: Good    Disposition: PACU - hemodynamically stable.    Procedure in detail:  The patient was brought to the OR and underwent general anesthesia.  His left face and scalp were prepped and draped in the usual sterile fashion.  The Doppler was used to bogdan the route of the temporal artery.  An incision was made over the site.  A combination of Bovie electrocautery and sharp dissection with the tenotomy scissors were used to dissect down to the artery.  We dissected along the length of the artery to expose proximally and distally.  There was a side branch noted which was ligated with 3-0 silk tie and a clip.  Once we had approximately 2-3 cm of the artery exposed with an ligated the proximal and distal ends of the artery using 3-0 silk suture and a clip.  The specimen was passed off.  Hemostasis was noted.  The incision was closed in 2 layers with a 3-0 Vicryl deep dermal followed by 4-0 Monocryl  subcuticular.  Sterile dressing was placed.  Patient tolerated the procedure in a stable and satisfactory condition.

## 2017-09-12 ENCOUNTER — TELEPHONE (OUTPATIENT)
Dept: RHEUMATOLOGY | Facility: CLINIC | Age: 62
End: 2017-09-12

## 2017-09-12 DIAGNOSIS — R70.0 ELEVATED ERYTHROCYTE SEDIMENTATION RATE: Primary | Chronic | ICD-10-CM

## 2017-09-12 DIAGNOSIS — R51.9 HEADACHE, UNSPECIFIED HEADACHE TYPE: ICD-10-CM

## 2017-09-12 NOTE — TELEPHONE ENCOUNTER
----- Message from Gato Jimenez MD sent at 9/12/2017  1:13 PM CDT -----  Call  And tell him temp artery Bx is OK- lower predniosne to 40 mg day for 1 week, then 20 mg day. Want to see in 2 weeks with jennifer crp and esr, cmp and cbc  ----- Message -----  From: Torin Bishop MD  Sent: 9/8/2017  12:08 PM  To: Gato Jimenez MD, Mateo Lambert, DO    Temporal artery biopsy results are negative

## 2017-09-12 NOTE — TELEPHONE ENCOUNTER
Mr. Green notified of results, medication dose adjustment and appointments. Lab scheduled for 09/22/2017 with previously scheduled 09/25/2017 appointment with Daisha Sumner PA-C.

## 2017-09-13 ENCOUNTER — OFFICE VISIT (OUTPATIENT)
Dept: INTERNAL MEDICINE | Facility: CLINIC | Age: 62
End: 2017-09-13
Payer: MEDICARE

## 2017-09-13 VITALS
HEART RATE: 95 BPM | RESPIRATION RATE: 18 BRPM | SYSTOLIC BLOOD PRESSURE: 145 MMHG | TEMPERATURE: 96 F | DIASTOLIC BLOOD PRESSURE: 95 MMHG | OXYGEN SATURATION: 95 % | WEIGHT: 210.56 LBS | HEIGHT: 65 IN | BODY MASS INDEX: 35.08 KG/M2

## 2017-09-13 DIAGNOSIS — Z12.11 COLON CANCER SCREENING: Primary | ICD-10-CM

## 2017-09-13 DIAGNOSIS — R51.9 PERSISTENT HEADACHES: Chronic | ICD-10-CM

## 2017-09-13 DIAGNOSIS — E11.42 DIABETIC POLYNEUROPATHY ASSOCIATED WITH TYPE 2 DIABETES MELLITUS: ICD-10-CM

## 2017-09-13 PROCEDURE — 3046F HEMOGLOBIN A1C LEVEL >9.0%: CPT | Mod: S$GLB,,, | Performed by: FAMILY MEDICINE

## 2017-09-13 PROCEDURE — 99214 OFFICE O/P EST MOD 30 MIN: CPT | Mod: S$GLB,,, | Performed by: FAMILY MEDICINE

## 2017-09-13 PROCEDURE — 3077F SYST BP >= 140 MM HG: CPT | Mod: S$GLB,,, | Performed by: FAMILY MEDICINE

## 2017-09-13 PROCEDURE — 4010F ACE/ARB THERAPY RXD/TAKEN: CPT | Mod: S$GLB,,, | Performed by: FAMILY MEDICINE

## 2017-09-13 PROCEDURE — 3008F BODY MASS INDEX DOCD: CPT | Mod: S$GLB,,, | Performed by: FAMILY MEDICINE

## 2017-09-13 PROCEDURE — 99999 PR PBB SHADOW E&M-EST. PATIENT-LVL III: CPT | Mod: PBBFAC,,, | Performed by: FAMILY MEDICINE

## 2017-09-13 PROCEDURE — 3080F DIAST BP >= 90 MM HG: CPT | Mod: S$GLB,,, | Performed by: FAMILY MEDICINE

## 2017-09-13 NOTE — PROGRESS NOTES
Subjective:       Patient ID: Crow Green is a 62 y.o. male.    Chief Complaint: Follow-up; Post-op Problem (lt side of head); and Dizziness    HPI  Came in today to follow-up from recent biopsy for temporal arteritis.  This biopsy was negative.  He is currently on a titration of prednisone and has follow-up planned with rheumatology.  Follow-up labs for this are scheduled.  Overall his headache is better and he is not having as much dizziness or vision issues.  He recently was seen by ophthalmology.  In regards to diabetes, it seems he is doing better and adhering to his medications but he needs to monitor more consistently and titrate his medications appropriately.    Family History   Problem Relation Age of Onset    Glaucoma Mother     Cataracts Mother     Cataracts Father     Glaucoma Sister     Cataracts Sister     Glaucoma Maternal Aunt     Prostate cancer Neg Hx        Current Outpatient Prescriptions:     ALCOHOL ANTISEPTIC PADS (ALCOHOL PREP PADS TOP), , Disp: , Rfl:     amiodarone (PACERONE) 200 MG Tab, Take 1 tablet (200 mg total) by mouth once daily., Disp: 30 tablet, Rfl: 11    amlodipine (NORVASC) 10 MG tablet, Take 1 tablet (10 mg total) by mouth once daily., Disp: 90 tablet, Rfl: 3    ammonium lactate 12 % Crea, Apply 1 Act topically 2 (two) times daily., Disp: 1 Tube, Rfl: 3    apixaban 5 mg Tab, Take 1 tablet (5 mg total) by mouth 2 (two) times daily., Disp: 60 tablet, Rfl: 1    aspirin (ECOTRIN) 81 MG EC tablet, Take 1 tablet (81 mg total) by mouth once daily., Disp: , Rfl: 0    atorvastatin (LIPITOR) 40 MG tablet, Take 1 tablet (40 mg total) by mouth every evening., Disp: 90 tablet, Rfl: 3    blood sugar diagnostic Strp, 1 strip by Misc.(Non-Drug; Combo Route) route 6 (six) times daily., Disp: 160 strip, Rfl: 11    duloxetine (CYMBALTA) 30 MG capsule, , Disp: , Rfl:     fluticasone-salmeterol 100-50 mcg/dose (ADVAIR DISKUS) 100-50 mcg/dose diskus inhaler, Inhale 1 puff  "into the lungs 2 (two) times daily. Controller, Disp: 180 each, Rfl: 3    gabapentin (NEURONTIN) 300 MG capsule, Take 1 capsule (300 mg total) by mouth 3 (three) times daily., Disp: 270 capsule, Rfl: 3    hydrocodone-acetaminophen 5-325mg (NORCO) 5-325 mg per tablet, Take 1 tablet by mouth every 6 (six) hours as needed for Pain., Disp: 10 tablet, Rfl: 0    insulin glargine (LANTUS SOLOSTAR) 100 unit/mL (3 mL) InPn pen, Inject 50 Units into the skin every evening., Disp: 2 Box, Rfl: 11    insulin syringe-needle U-100 1 mL 31 gauge x 5/16 Syrg, , Disp: , Rfl:     latanoprost 0.005 % ophthalmic solution, Place 1 drop into both eyes every evening., Disp: 1 Bottle, Rfl: 4    lisinopril (PRINIVIL,ZESTRIL) 5 MG tablet, Take 1 tablet (5 mg total) by mouth once daily., Disp: 90 tablet, Rfl: 2    metformin (GLUCOPHAGE) 1000 MG tablet, Take 1 tablet (1,000 mg total) by mouth 2 (two) times daily with meals., Disp: 180 tablet, Rfl: 3    metoprolol succinate (TOPROL-XL) 50 MG 24 hr tablet, TAKE 1 TABLET EVERY DAY, Disp: 30 tablet, Rfl: 5    nitroGLYCERIN (NITROSTAT) 0.3 MG SL tablet, Place 1 tablet (0.3 mg total) under the tongue every 5 (five) minutes as needed for Chest pain. No more than 3 tablets in one day., Disp: 30 tablet, Rfl: 1    NOVOLOG FLEXPEN 100 unit/mL InPn pen, ADMINISTER 20 UNITS UNDER THE SKIN THREE TIMES DAILY WITH MEALS, Disp: 15 mL, Rfl: 0    pantoprazole (PROTONIX) 40 MG tablet, Take 1 tablet (40 mg total) by mouth once daily., Disp: 30 tablet, Rfl: 11    pen needle, diabetic (BD INSULIN PEN NEEDLE UF SHORT) 31 gauge x 5/16" Ndle, Inject 1 each into the skin 3 (three) times daily., Disp: 90 each, Rfl: 11    sertraline (ZOLOFT) 25 MG tablet, , Disp: , Rfl:     tiotropium (SPIRIVA WITH HANDIHALER) 18 mcg inhalation capsule, Inhale 1 capsule (18 mcg total) into the lungs once daily. Controller, Disp: 90 capsule, Rfl: 3    trazodone (DESYREL) 150 MG tablet, , Disp: , Rfl:     TRUE METRIX AIR " "GLUCOSE METER kit, TEST FOUR TIMES DAILY BEFORE MEALS  AND EVERY NIGHT, Disp: 1 each, Rfl: 0    VENTOLIN HFA 90 mcg/actuation inhaler, , Disp: , Rfl:     isosorbide mononitrate (IMDUR) 30 MG 24 hr tablet, Take 1 tablet (30 mg total) by mouth once daily., Disp: 30 tablet, Rfl: 0    Review of Systems   Constitutional: Negative for chills and fever.   Eyes: Negative for visual disturbance.   Respiratory: Negative for cough and shortness of breath.    Cardiovascular: Negative for chest pain.   Gastrointestinal: Negative for abdominal pain.   Skin: Positive for wound (healing well from biopsy. minimal pain).   Neurological: Positive for headaches (actually improved. ). Negative for dizziness.       Objective:   BP (!) 145/95 (BP Location: Left arm, Patient Position: Sitting, BP Method: Large (Automatic))   Pulse 95   Temp 96 °F (35.6 °C) (Tympanic)   Resp 18   Ht 5' 5" (1.651 m)   Wt 95.5 kg (210 lb 8.6 oz)   SpO2 95%   BMI 35.04 kg/m²      Physical Exam   Constitutional: He is oriented to person, place, and time. He appears well-developed and well-nourished.   HENT:   Head: Normocephalic and atraumatic.   Eyes: Conjunctivae are normal.   Cardiovascular: Normal rate.    Pulmonary/Chest: Effort normal. No respiratory distress.   Musculoskeletal: He exhibits no edema.   Neurological: He is alert and oriented to person, place, and time. Coordination normal.   Skin: Skin is warm and dry. No rash noted.   No signs of infection at biopsy site   Psychiatric: He has a normal mood and affect. His behavior is normal.   Vitals reviewed.      Assessment & Plan     1. Colon cancer screening  - Case request GI: COLONOSCOPY    2. Diabetic polyneuropathy associated with type 2 diabetes mellitus  He continues to follow-up with diabetes management.  I printed out the most recent progress note from diabetes management and highlighted the areas that he can focus on such as titrating up Lantus and pre-meal insulin based on his blood " glucose readings.  I emphasized the importance of monitoring at least twice a day and riding down his numbers to bring into the next visits.  Encouraged exercise and portion control for dietary intake.   Referring for walker and diabetic shoes due to his gait instability secondary to diabetic peripheral neuropathy.  - DIABETIC SHOES FOR HOME USE  - WALKER FOR HOME USE    3. Persistent headaches  This is actually improved and fortunately the biopsy was negative for temporal arteritis.  He is titrating down the prednisone as recommended by rheumatology.

## 2017-09-14 ENCOUNTER — TELEPHONE (OUTPATIENT)
Dept: INTERNAL MEDICINE | Facility: CLINIC | Age: 62
End: 2017-09-14

## 2017-09-14 NOTE — TELEPHONE ENCOUNTER
----- Message from Fabiola Mandujano sent at 9/14/2017  2:56 PM CDT -----  Contact: Pt  Pt is requesting to speak to the nurse regarding his order for physical therapy. Pls call pt back at [phone].

## 2017-09-15 ENCOUNTER — TELEPHONE (OUTPATIENT)
Dept: INTERNAL MEDICINE | Facility: CLINIC | Age: 62
End: 2017-09-15

## 2017-09-15 NOTE — TELEPHONE ENCOUNTER
----- Message from Nichole Otoole sent at 9/15/2017 11:29 AM CDT -----  Contact: pt  Pt is calling Nurse staff regarding the patient received the wrong walker . Pt is requesting  The rollator Walker. Pt call back [phone] thanks

## 2017-09-18 ENCOUNTER — TELEPHONE (OUTPATIENT)
Dept: INTERNAL MEDICINE | Facility: CLINIC | Age: 62
End: 2017-09-18

## 2017-09-18 DIAGNOSIS — E11.42 DIABETIC POLYNEUROPATHY ASSOCIATED WITH TYPE 2 DIABETES MELLITUS: Primary | ICD-10-CM

## 2017-09-18 NOTE — TELEPHONE ENCOUNTER
----- Message from Georgette May sent at 9/18/2017 11:15 AM CDT -----  Please call pt back at [phone] about his walker and the shoes.

## 2017-09-18 NOTE — TELEPHONE ENCOUNTER
Pt called stating the wrong order was sent for walker. Pt stated it has to be worded roller walker. Please redo. Thanks.

## 2017-09-19 ENCOUNTER — TELEPHONE (OUTPATIENT)
Dept: CARDIOTHORACIC SURGERY | Facility: CLINIC | Age: 62
End: 2017-09-19

## 2017-09-19 NOTE — TELEPHONE ENCOUNTER
----- Message from Cheryl Hull sent at 9/19/2017  9:12 AM CDT -----  Contact: pt  The pt states he needs a order for physical therapy, pt request a call at [phone]///[id number]

## 2017-09-19 NOTE — TELEPHONE ENCOUNTER
----- Message from Esther Lund sent at 9/18/2017  7:17 PM CDT -----  Contact: Self   Pt is requesting a call back in regards to rescheduling his appt that was originally scheduled for today       Pt can be contacted at [phone]

## 2017-09-19 NOTE — TELEPHONE ENCOUNTER
----- Message from Silvio Rendon sent at 9/19/2017  1:42 PM CDT -----  Pt is requesting a call from nurse to discuss his order for his roll aide and shoes.          Please call pt back at [phone]

## 2017-09-19 NOTE — TELEPHONE ENCOUNTER
Nurse called pt in regards to request, pt voiced understanding and will await request from Dr Lambert.

## 2017-09-19 NOTE — TELEPHONE ENCOUNTER
Spoke to patient he is aware of his rescheduled appointment date and time, he voiced understanding.

## 2017-09-19 NOTE — TELEPHONE ENCOUNTER
Spoke with pt and then Kiki the Physical therapist at [facility]. She said the pt was discharged from Physical Therapy. He needs a new Rx for Physical Therapy and his last office visit notes faxed to them at [phone]

## 2017-09-20 ENCOUNTER — TELEPHONE (OUTPATIENT)
Dept: INTERNAL MEDICINE | Facility: CLINIC | Age: 62
End: 2017-09-20

## 2017-09-20 NOTE — TELEPHONE ENCOUNTER
----- Message from Favian Amaya sent at 9/20/2017  9:42 AM CDT -----  Contact: [facility]  Caller is calling to check on the status of the pt walker. Please give them a call at [phone] [phone]

## 2017-09-21 ENCOUNTER — TELEPHONE (OUTPATIENT)
Dept: INTERNAL MEDICINE | Facility: CLINIC | Age: 62
End: 2017-09-21

## 2017-09-21 DIAGNOSIS — R26.81 GAIT INSTABILITY: ICD-10-CM

## 2017-09-21 NOTE — TELEPHONE ENCOUNTER
----- Message from Cheryl Hull sent at 9/21/2017  9:10 AM CDT -----  Contact: pt  The pt states he is at the Chiropractor now and  needs a order please fax to [phone], pt can be reached at [phone]///[email or web address]///[email or web address]

## 2017-09-21 NOTE — TELEPHONE ENCOUNTER
----- Message from Nathen Mujica sent at 9/21/2017  9:11 AM CDT -----  Contact: Iris from [facility]   States she's calling to follow up on the referral for pt to start phys therapy again and can be faxed to [phone] and can be reached at [phone]//[email or web address]

## 2017-09-21 NOTE — TELEPHONE ENCOUNTER
Patient is requesting order for physical therapy . [facility]

## 2017-09-25 ENCOUNTER — OFFICE VISIT (OUTPATIENT)
Dept: RHEUMATOLOGY | Facility: CLINIC | Age: 62
End: 2017-09-25
Payer: MEDICARE

## 2017-09-25 ENCOUNTER — LAB VISIT (OUTPATIENT)
Dept: LAB | Facility: HOSPITAL | Age: 62
End: 2017-09-25
Attending: INTERNAL MEDICINE
Payer: MEDICARE

## 2017-09-25 ENCOUNTER — TELEPHONE (OUTPATIENT)
Dept: INTERNAL MEDICINE | Facility: CLINIC | Age: 62
End: 2017-09-25

## 2017-09-25 VITALS
WEIGHT: 217.63 LBS | BODY MASS INDEX: 36.21 KG/M2 | DIASTOLIC BLOOD PRESSURE: 81 MMHG | SYSTOLIC BLOOD PRESSURE: 142 MMHG | HEART RATE: 91 BPM

## 2017-09-25 DIAGNOSIS — R51.9 PERSISTENT HEADACHES: Primary | ICD-10-CM

## 2017-09-25 DIAGNOSIS — R70.0 ELEVATED ERYTHROCYTE SEDIMENTATION RATE: ICD-10-CM

## 2017-09-25 DIAGNOSIS — M25.511 CHRONIC RIGHT SHOULDER PAIN: ICD-10-CM

## 2017-09-25 DIAGNOSIS — E11.42 DIABETIC POLYNEUROPATHY ASSOCIATED WITH TYPE 2 DIABETES MELLITUS: ICD-10-CM

## 2017-09-25 DIAGNOSIS — G89.29 CHRONIC RIGHT SHOULDER PAIN: ICD-10-CM

## 2017-09-25 DIAGNOSIS — M25.551 RIGHT HIP PAIN: ICD-10-CM

## 2017-09-25 DIAGNOSIS — E11.3293 MILD NONPROLIFERATIVE DIABETIC RETINOPATHY OF BOTH EYES WITHOUT MACULAR EDEMA ASSOCIATED WITH TYPE 2 DIABETES MELLITUS: ICD-10-CM

## 2017-09-25 DIAGNOSIS — R51.9 HEADACHE, UNSPECIFIED HEADACHE TYPE: ICD-10-CM

## 2017-09-25 DIAGNOSIS — R70.0 ELEVATED ERYTHROCYTE SEDIMENTATION RATE: Chronic | ICD-10-CM

## 2017-09-25 LAB
ALBUMIN SERPL BCP-MCNC: 3.1 G/DL
ALP SERPL-CCNC: 67 U/L
ALT SERPL W/O P-5'-P-CCNC: 32 U/L
ANION GAP SERPL CALC-SCNC: 11 MMOL/L
AST SERPL-CCNC: 24 U/L
BASOPHILS # BLD AUTO: 0.01 K/UL
BASOPHILS NFR BLD: 0.1 %
BILIRUB SERPL-MCNC: 0.2 MG/DL
BUN SERPL-MCNC: 15 MG/DL
CALCIUM SERPL-MCNC: 9.5 MG/DL
CHLORIDE SERPL-SCNC: 105 MMOL/L
CO2 SERPL-SCNC: 28 MMOL/L
CREAT SERPL-MCNC: 0.8 MG/DL
CRP SERPL-MCNC: 2.5 MG/L
DIFFERENTIAL METHOD: ABNORMAL
EOSINOPHIL # BLD AUTO: 0.1 K/UL
EOSINOPHIL NFR BLD: 0.6 %
ERYTHROCYTE [DISTWIDTH] IN BLOOD BY AUTOMATED COUNT: 13.4 %
ERYTHROCYTE [SEDIMENTATION RATE] IN BLOOD BY WESTERGREN METHOD: 28 MM/HR
EST. GFR  (AFRICAN AMERICAN): >60 ML/MIN/1.73 M^2
EST. GFR  (NON AFRICAN AMERICAN): >60 ML/MIN/1.73 M^2
GLUCOSE SERPL-MCNC: 124 MG/DL
HCT VFR BLD AUTO: 40.7 %
HGB BLD-MCNC: 13.2 G/DL
LYMPHOCYTES # BLD AUTO: 4.2 K/UL
LYMPHOCYTES NFR BLD: 33.5 %
MCH RBC QN AUTO: 28.6 PG
MCHC RBC AUTO-ENTMCNC: 32.4 G/DL
MCV RBC AUTO: 88 FL
MONOCYTES # BLD AUTO: 0.9 K/UL
MONOCYTES NFR BLD: 7 %
NEUTROPHILS # BLD AUTO: 7.5 K/UL
NEUTROPHILS NFR BLD: 58.8 %
PLATELET # BLD AUTO: 213 K/UL
PMV BLD AUTO: 10 FL
POTASSIUM SERPL-SCNC: 3.7 MMOL/L
PROT SERPL-MCNC: 6.7 G/DL
RBC # BLD AUTO: 4.61 M/UL
SODIUM SERPL-SCNC: 144 MMOL/L
WBC # BLD AUTO: 12.64 K/UL

## 2017-09-25 PROCEDURE — 80053 COMPREHEN METABOLIC PANEL: CPT | Mod: PO

## 2017-09-25 PROCEDURE — 3079F DIAST BP 80-89 MM HG: CPT | Mod: S$GLB,,, | Performed by: PHYSICIAN ASSISTANT

## 2017-09-25 PROCEDURE — 3077F SYST BP >= 140 MM HG: CPT | Mod: S$GLB,,, | Performed by: PHYSICIAN ASSISTANT

## 2017-09-25 PROCEDURE — 85025 COMPLETE CBC W/AUTO DIFF WBC: CPT | Mod: PO

## 2017-09-25 PROCEDURE — 3008F BODY MASS INDEX DOCD: CPT | Mod: S$GLB,,, | Performed by: PHYSICIAN ASSISTANT

## 2017-09-25 PROCEDURE — 36415 COLL VENOUS BLD VENIPUNCTURE: CPT | Mod: PO

## 2017-09-25 PROCEDURE — 99999 PR PBB SHADOW E&M-EST. PATIENT-LVL III: CPT | Mod: PBBFAC,,, | Performed by: PHYSICIAN ASSISTANT

## 2017-09-25 PROCEDURE — 85651 RBC SED RATE NONAUTOMATED: CPT | Mod: PO

## 2017-09-25 PROCEDURE — 99214 OFFICE O/P EST MOD 30 MIN: CPT | Mod: S$GLB,,, | Performed by: PHYSICIAN ASSISTANT

## 2017-09-25 PROCEDURE — 86140 C-REACTIVE PROTEIN: CPT

## 2017-09-25 RX ORDER — PREDNISONE 10 MG/1
TABLET ORAL
Qty: 90 TABLET | Refills: 2 | Status: SHIPPED | OUTPATIENT
Start: 2017-09-25 | End: 2017-11-13 | Stop reason: SDUPTHER

## 2017-09-25 NOTE — PATIENT INSTRUCTIONS
Labs today     Go down to prednisone 20mg/day x 2 weeks  Then 10mg/day until you follow back up with us  Labs done early next time    Tylenol max dose 3000mg day     PT order will be faxed in to [facility]

## 2017-09-25 NOTE — TELEPHONE ENCOUNTER
Patient contact insurance company with complains that home health company discharge form service due to not being home. Patient states he has multiple appointment and don't have transportation so, when he get a ride he have togo. Requesting that home health contact before coming out to he' ll know inform if he have an appointment that day. Patient is requesting a referral for home mary to help with medication management. Patient was using [facility].

## 2017-09-25 NOTE — TELEPHONE ENCOUNTER
----- Message from Skylar Whitt sent at 9/25/2017  1:05 PM CDT -----  Contact: [facility]  Calling regarding pt order to get home health @  [phone], Ext [phone]

## 2017-09-25 NOTE — PROGRESS NOTES
Subjective:       Patient ID: Crow Green is a 62 y.o. male.    Chief Complaint: shoulder pain, headaches, elevated sed rate    Crow was sent to us recently for evaluation for GCA. He c/o significant headaches and tenderness to his left temporal artery, as well as shoulder and hip complaints, plus elevated esr.  He was started on prednisone 60mg/day and sent for biopsy.  Biopsy was negative and Dr. Jimenez instructed him to taper down on his steroid from 40mg/d x 1 week then to 20mg/day.  His headaches have been present for months.  He has chronic stiffness, joint complaints that didn't fit typical PMR symptoms.  He has chronic shoulder pain, right, with limited motion. He has Right hip pain and is in PT for that (has been put on hold with recent biopsy).  He has diabetes with associated diabetic retinopathy and polyneuropathy. His sed rate was trending down last checked, not done today.  Spep showed no monoclonal peaks, did have some increased beta globulins.     Today he complains of pain in his right arm/shoulder, he has painful range of motion, pain and tenderness in his trapezius muscle, and numbness in his right arm.  He has some residual pain in his left temple area, mild, still with mild headaches occasionally.        Review of Systems   Constitutional: Negative for chills, fatigue, fever and unexpected weight change.   HENT: Negative for congestion, mouth sores and rhinorrhea.    Eyes: Negative for pain, discharge and redness.   Respiratory: Negative for cough and shortness of breath.    Cardiovascular: Negative for chest pain and leg swelling.   Gastrointestinal: Negative for abdominal pain, diarrhea, nausea and vomiting.   Endocrine: Negative for cold intolerance and heat intolerance.   Genitourinary: Negative for dysuria.   Musculoskeletal: Positive for arthralgias and myalgias. Negative for joint swelling.   Skin: Negative for rash.   Allergic/Immunologic: Negative for immunocompromised  state.   Neurological: Positive for numbness and headaches. Negative for weakness.   Psychiatric/Behavioral: The patient is not nervous/anxious.          Objective:   BP (!) 142/81   Pulse 91   Wt 98.7 kg (217 lb 9.5 oz)   BMI 36.21 kg/m²      Physical Exam   Constitutional: He is oriented to person, place, and time and well-developed, well-nourished, and in no distress.   HENT:   Head: Normocephalic and atraumatic.   Left temple with scar from biopsy mild tenderness   Eyes: Pupils are equal, round, and reactive to light. Right eye exhibits no discharge.   Neck: Normal range of motion.   Cardiovascular: Normal rate, regular rhythm and normal heart sounds.  Exam reveals no friction rub.    Pulmonary/Chest: Effort normal and breath sounds normal. No respiratory distress.   Abdominal: Soft. He exhibits no distension. There is no tenderness.   Lymphadenopathy:     He has no cervical adenopathy.   Neurological: He is alert and oriented to person, place, and time.   Skin: No rash noted. No erythema.     Psychiatric: Mood normal.   Musculoskeletal: Normal range of motion. He exhibits tenderness. He exhibits no edema or deformity.   Right shoulder rom decreased about 110 degree ff, passive rom full but very painful    tender to palpation over the left trapezius muscle and shoulder    No synovitis to any joints         No results found for this or any previous visit (from the past 168 hour(s)).   Results for SHAGGY JASON (MRN [id number]) as of 9/25/2017 09:46   Ref. Range 8/29/2017 12:40   IgG - Serum Latest Ref Range: 650 - 1600 mg/dL 1202   IgM Latest Ref Range: 50 - 300 mg/dL 55   IgA Latest Ref Range: 40 - 350 mg/dL 643 (H)   Protein, Serum Latest Ref Range: 6.0 - 8.4 g/dL 7.0   Albumin grams/dl Latest Ref Range: 3.35 - 5.55 g/dL 3.39   Alpha-1 grams/dl Latest Ref Range: 0.17 - 0.41 g/dL 0.28   Alpha-2 grams/dl Latest Ref Range: 0.43 - 0.99 g/dL 0.99   Beta grams/dl Latest Ref Range: 0.50 - 1.10 g/dL 1.15 (H)    Gamma grams/dl Latest Ref Range: 0.67 - 1.58 g/dL 1.19   Pathologist Interpretation SPE Unknown REVIEWED   Pathologist Interpretation DARSHANA Unknown REVIEWED   [employer] Interp. Unknown SEE COMMENT     Assessment:       1. Persistent headaches    2. Elevated erythrocyte sedimentation rate    3. Chronic right shoulder pain    4. Right hip pain    5. Diabetic polyneuropathy associated with type 2 diabetes mellitus    6. Mild nonproliferative diabetic retinopathy of both eyes without macular edema associated with type 2 diabetes mellitus        Impression:    Chronic headaches with recent exacerbation, left-sided--this likely not GCA, temporal artery biopsy is negative, symptoms not typical PMR, chronic shoulder and hip pain on the right, Sed rate improving (no labs yet today)--currently on pred 40mg/d  Right hip pain: djd--has been in PT  Right shoulder pain: worsening, likely has djd with rotator cuff arthropathy involved, could also have referred pain from c spine deg arthritis  Increased sed rate with normal CRP: spep with no monoclonal peaks, he does have increased beta globulins, sed rate improved last checked  Diabetes mellitus with neuropathy and retinopathy    Plan:       Decrease prednisone to 20mg/day x 2 week, then 10mg/day (2 wks) until follow up, will cont to lower and watch inflammatory marker  Send back to [facility] for right shoulder RTC issues and right hip  Cont low dose asa  Get labs done today, check esr, crp  If shoulder still an issues next visit we could inject as long as on prednisone 10mg or less  Encouraged tylenol prn for pain keep at 3000mg or less daily    rtc in 4 wks with reg labs early   Case presented to Dr Jimenez. He was present in the exam room. PE done independently. Impression and Plan done in collaboration  Hx and exam reviewed  with Mrs Sumner, helped develop  Imp/Plan as above and discussed same with Diff. Dx considered. THAI Jimenez MD

## 2017-09-27 ENCOUNTER — TELEPHONE (OUTPATIENT)
Dept: INTERNAL MEDICINE | Facility: CLINIC | Age: 62
End: 2017-09-27

## 2017-09-27 NOTE — TELEPHONE ENCOUNTER
Spoke with some one, who faxed a form over to be fill out for diabetic shoe. Was told to complete form and fax back with clinic notes

## 2017-09-27 NOTE — TELEPHONE ENCOUNTER
----- Message from Carla Dillard sent at 9/27/2017 10:38 AM CDT -----  Contact: self   Patient would like to consult with nurse regarding  diabetic shoes. Please call back at [phone] for authorization.    Thanks,  Carla Dillard

## 2017-09-27 NOTE — TELEPHONE ENCOUNTER
----- Message from Nathen Mujica sent at 9/27/2017 11:10 AM CDT -----  Contact: [employer] [facility] [place]   States she's calling rg martin to know if the member has an rx that was sent for his diabetic shoes and states she spoke with someone in [place] and one is needed and also for heat moldable inserts and 6 mnths of chart notes stating that pt is being treated for the diabetes and can be reached at [phone] ext [phone]//[email or web address]

## 2017-09-28 ENCOUNTER — TELEPHONE (OUTPATIENT)
Dept: PULMONOLOGY | Facility: CLINIC | Age: 62
End: 2017-09-28

## 2017-09-28 ENCOUNTER — OFFICE VISIT (OUTPATIENT)
Dept: DIABETES | Facility: CLINIC | Age: 62
End: 2017-09-28
Payer: MEDICARE

## 2017-09-28 ENCOUNTER — LAB VISIT (OUTPATIENT)
Dept: LAB | Facility: HOSPITAL | Age: 62
End: 2017-09-28
Attending: PHYSICIAN ASSISTANT
Payer: MEDICARE

## 2017-09-28 VITALS
BODY MASS INDEX: 34.75 KG/M2 | HEIGHT: 65 IN | DIASTOLIC BLOOD PRESSURE: 90 MMHG | SYSTOLIC BLOOD PRESSURE: 138 MMHG | WEIGHT: 208.56 LBS

## 2017-09-28 DIAGNOSIS — J45.20 MILD INTERMITTENT ASTHMA WITHOUT COMPLICATION: Primary | ICD-10-CM

## 2017-09-28 LAB — GLUCOSE SERPL-MCNC: 254 MG/DL (ref 70–110)

## 2017-09-28 PROCEDURE — 3008F BODY MASS INDEX DOCD: CPT | Mod: S$GLB,,, | Performed by: PHYSICIAN ASSISTANT

## 2017-09-28 PROCEDURE — 83036 HEMOGLOBIN GLYCOSYLATED A1C: CPT

## 2017-09-28 PROCEDURE — 3075F SYST BP GE 130 - 139MM HG: CPT | Mod: S$GLB,,, | Performed by: PHYSICIAN ASSISTANT

## 2017-09-28 PROCEDURE — 3080F DIAST BP >= 90 MM HG: CPT | Mod: S$GLB,,, | Performed by: PHYSICIAN ASSISTANT

## 2017-09-28 PROCEDURE — 82948 REAGENT STRIP/BLOOD GLUCOSE: CPT | Mod: S$GLB,,, | Performed by: PHYSICIAN ASSISTANT

## 2017-09-28 PROCEDURE — 99214 OFFICE O/P EST MOD 30 MIN: CPT | Mod: S$GLB,,, | Performed by: PHYSICIAN ASSISTANT

## 2017-09-28 PROCEDURE — 36415 COLL VENOUS BLD VENIPUNCTURE: CPT | Mod: PO

## 2017-09-28 PROCEDURE — 99999 PR PBB SHADOW E&M-EST. PATIENT-LVL III: CPT | Mod: PBBFAC,,, | Performed by: PHYSICIAN ASSISTANT

## 2017-09-28 NOTE — PROGRESS NOTES
Subjective:      Patient ID: Crow Green is a 62 y.o. male.    PCP: Mateo Lambert DO      Crow Green is a pleasant 62 y.o. male presenting to follow up on diabetes mellitus. He has had diabetes for 17 or more years. His last visit in Diabetes Management was 8/31/2017 Since that time he has had no improvement in his glycemia. His blood sugar range fasting has been  and 2 hour post meal has been not taking, and he has been monitoring 2-3 times per day as directed. He is on a tapering dose of Prednisone for temporal arteritis, however the Bx was negative. His current concerns are glycemic control.    He denies any hospital admissions, emergency room visits, hypoglycemia, syncope, diaphoresis, chest pain, or dyspnea.    He has lost 9 pounds since last visit. His BMI is 34.71    His blood sugar in the clinic today was:   Lab Results   Component Value Date    POCGLU 254 (A) 09/28/2017       We discussed the American diabetes Association recommendations:  hemoglobin A1c below 7.0%; all diabetics should be on statins unless contraindicated; one aspirin daily unless contraindicated; fasting blood sugar between 80 and 130 mg/dL; postprandial blood sugar below 180 mg/dl; prevention of hypoglycemia, may adjust goals to higher levels if persistent; ACE or ARB therapy if not contraindicated; and maintain in an ideal body weight with BMI below 25.    Crow is compliant most of the time with DM medications.     Crow is compliant most of the time with lifestyle modifications to include activity and meal planning.       STANDARDS OF CARE:  Eye doctor: Dr. Dias, last exam 6/13/2017.  Dental exam: Recommend regular exams; denies gums bleeding.  Podiatry doctor:     ACTIVITY LEVEL: He exercises rarely.  BLOOD GLUCOSE TESTING: Self-monitoring with   SOCIAL HISTORY: . Lives with spouse. retired no tobacco use    The following results were reviewed with patient.    Lab Results   Component Value Date     WBC 12.64 09/25/2017    HGB 13.2 (L) 09/25/2017    HCT 40.7 09/25/2017     09/25/2017    CHOL 171 07/07/2017    TRIG 83 07/07/2017    HDL 42 07/07/2017    ALT 32 09/25/2017    AST 24 09/25/2017     09/25/2017    K 3.7 09/25/2017     09/25/2017    CREATININE 0.8 09/25/2017    ESTGFRAFRICA >60 09/25/2017    EGFRNONAA >60 09/25/2017    BUN 15 09/25/2017    CO2 28 09/25/2017    TSH 2.374 07/20/2017    PSA 0.83 01/18/2013    INR 1.0 03/15/2017     (H) 09/25/2017       Lab Results   Component Value Date    HGBA1C 9.6 (H) 07/20/2017    HGBA1C 9.6 (H) 06/16/2017    HGBA1C 8.8 (H) 03/16/2017       Lab Results   Component Value Date    GLUTAMICACID 0.01 07/20/2017    CPEPTIDE 4.37 07/20/2017     Lab Results   Component Value Date    TSH 2.374 07/20/2017     Lab Results   Component Value Date    TOTALTESTOST 182 (L) 01/18/2013     Lab Results   Component Value Date    TESTOSTERONE 213 (L) 12/04/2014    TESTOSTERONE 35.9 (L) 12/04/2014     Lab Results   Component Value Date    FERRITIN 505 (H) 01/12/2017     Lab Results   Component Value Date    PTH 95.0 (H) 12/04/2014    CALCIUM 9.5 09/25/2017    PHOS 2.6 (L) 11/16/2016       Review of patient's allergies indicates:  No Known Allergies    Past Medical History:   Diagnosis Date    Arthritis     Asthma     Depression     Diabetes mellitus     Diabetes mellitus, type 2 Diagnosed in 2000    Elevated PSA     Hypertension        Review of Systems   Constitutional: Negative.  Negative for activity change, appetite change, chills, diaphoresis, fatigue, fever and unexpected weight change.   HENT: Negative.  Negative for dental problem, facial swelling, hearing loss, nosebleeds, trouble swallowing and voice change.    Eyes: Negative.  Negative for photophobia, pain, discharge, redness, itching and visual disturbance.   Respiratory: Negative.  Negative for cough, choking, chest tightness, shortness of breath and wheezing.    Cardiovascular: Negative.   "Negative for chest pain, palpitations and leg swelling.   Gastrointestinal: Negative.  Negative for abdominal distention, abdominal pain, blood in stool, constipation, diarrhea, nausea and vomiting.   Endocrine: Negative.  Negative for cold intolerance, heat intolerance, polydipsia, polyphagia and polyuria.   Genitourinary: Negative.  Negative for decreased urine volume, difficulty urinating, dysuria, frequency, scrotal swelling, testicular pain and urgency.   Musculoskeletal: Negative.  Negative for arthralgias, back pain, gait problem, joint swelling, myalgias, neck pain and neck stiffness.   Skin: Negative.  Negative for color change, pallor, rash and wound.   Allergic/Immunologic: Negative.  Negative for environmental allergies, food allergies and immunocompromised state.   Neurological: Negative.  Negative for dizziness, tremors, seizures, syncope, facial asymmetry, speech difficulty, weakness, light-headedness, numbness and headaches.   Hematological: Negative.  Negative for adenopathy. Does not bruise/bleed easily.   Psychiatric/Behavioral: Negative.  Negative for agitation, behavioral problems, confusion, decreased concentration, dysphoric mood, hallucinations, self-injury, sleep disturbance and suicidal ideas. The patient is not nervous/anxious and is not hyperactive.       Objective:     Vitals - 1 value per visit 9/13/2017 9/25/2017 9/28/2017   SYSTOLIC 145 142 138   DIASTOLIC 95 81 90   PULSE 95 91 -   TEMPERATURE 96 - -   RESPIRATIONS 18 - -   SPO2 95 - -   Weight (lb) 210.54 217.59 208.56   Weight (kg) 95.5 98.7 94.6   HEIGHT 5' 5" - 5' 5"   BODY MASS INDEX 35.04 36.21 34.71   VISIT REPORT - - -   Pain Score  2 5 3   Some recent data might be hidden       Physical Exam   Constitutional: He is oriented to person, place, and time. He appears well-developed and well-nourished. He is cooperative.  Non-toxic appearance. He does not have a sickly appearance. He does not appear ill. No distress. He is not " intubated.   HENT:   Head: Normocephalic and atraumatic. Not macrocephalic and not microcephalic. Head is without raccoon's eyes, without Summers's sign, without abrasion, without contusion, without laceration, without right periorbital erythema and without left periorbital erythema. Hair is normal.   Right Ear: External ear normal. No lacerations. No drainage, swelling or tenderness. No foreign bodies. No mastoid tenderness. Tympanic membrane is not injected, not scarred, not perforated, not erythematous, not retracted and not bulging. Tympanic membrane mobility is normal. No middle ear effusion. No hemotympanum. No decreased hearing is noted.   Left Ear: External ear normal. No lacerations. No drainage, swelling or tenderness. No foreign bodies. No mastoid tenderness. Tympanic membrane is not injected, not scarred, not perforated, not erythematous, not retracted and not bulging. Tympanic membrane mobility is normal.  No middle ear effusion. No hemotympanum. No decreased hearing is noted.   Nose: Nose normal.   Mouth/Throat: Oropharynx is clear and moist.   Eyes: EOM and lids are normal. Pupils are equal, round, and reactive to light. Right eye exhibits no chemosis, no discharge, no exudate and no hordeolum. No foreign body present in the right eye. Left eye exhibits no chemosis, no discharge, no exudate and no hordeolum. No foreign body present in the left eye. Right conjunctiva is not injected. Right conjunctiva has no hemorrhage. Left conjunctiva is not injected. Left conjunctiva has no hemorrhage. No scleral icterus. Right eye exhibits normal extraocular motion and no nystagmus. Left eye exhibits normal extraocular motion and no nystagmus. Right pupil is round and reactive. Left pupil is round and reactive. Pupils are equal.   Neck: Normal range of motion, full passive range of motion without pain and phonation normal. Neck supple. Normal carotid pulses, no hepatojugular reflux and no JVD present. No tracheal  tenderness, no spinous process tenderness and no muscular tenderness present. Carotid bruit is not present. No neck rigidity. No tracheal deviation, no edema, no erythema and normal range of motion present. No thyroid mass and no thyromegaly present.   Cardiovascular: Normal rate, regular rhythm, normal heart sounds and intact distal pulses.   No extrasystoles are present. PMI is not displaced.  Exam reveals no gallop, no friction rub and no decreased pulses.    No murmur heard.  Pulses:       Carotid pulses are 2+ on the right side, and 2+ on the left side.       Radial pulses are 2+ on the right side, and 2+ on the left side.        Dorsalis pedis pulses are 2+ on the right side, and 2+ on the left side.        Posterior tibial pulses are 2+ on the right side, and 2+ on the left side.   Pulmonary/Chest: Effort normal and breath sounds normal. No accessory muscle usage or stridor. No apnea, no tachypnea and no bradypnea. He is not intubated. No respiratory distress. He has no decreased breath sounds. He has no wheezes. He has no rhonchi. He has no rales. He exhibits no tenderness.   Abdominal: Soft. Bowel sounds are normal. He exhibits no shifting dullness, no distension, no pulsatile liver, no fluid wave, no abdominal bruit, no ascites, no pulsatile midline mass and no mass. There is no hepatosplenomegaly, splenomegaly or hepatomegaly. There is no tenderness. There is no rigidity, no rebound, no guarding, no CVA tenderness and no tenderness at McBurney's point. No hernia. Hernia confirmed negative in the ventral area.   Musculoskeletal: Normal range of motion. He exhibits no edema or tenderness.        Right foot: There is normal range of motion and no deformity.        Left foot: There is normal range of motion and no deformity.   Feet:   Right Foot:   Protective Sensation: 6 sites tested. 6 sites sensed.   Skin Integrity: Negative for ulcer, blister, skin breakdown, erythema, warmth, callus or dry skin.   Left  Foot:   Protective Sensation: 6 sites tested. 6 sites sensed.   Skin Integrity: Negative for ulcer, blister, skin breakdown, erythema, warmth, callus or dry skin.   Lymphadenopathy:        Head (right side): No submental, no submandibular, no tonsillar, no preauricular, no posterior auricular and no occipital adenopathy present.        Head (left side): No submental, no submandibular, no tonsillar, no preauricular, no posterior auricular and no occipital adenopathy present.     He has no cervical adenopathy.        Right cervical: No superficial cervical, no deep cervical and no posterior cervical adenopathy present.       Left cervical: No superficial cervical, no deep cervical and no posterior cervical adenopathy present.   Neurological: He is alert and oriented to person, place, and time. He has normal reflexes. He displays no atrophy and no tremor. No cranial nerve deficit or sensory deficit. He exhibits normal muscle tone. He displays no seizure activity. Coordination and gait normal.   Reflex Scores:       Bicep reflexes are 2+ on the right side and 2+ on the left side.       Brachioradialis reflexes are 2+ on the right side and 2+ on the left side.       Patellar reflexes are 2+ on the right side and 2+ on the left side.  Skin: Skin is warm and dry. No rash noted. No erythema. No pallor.   Psychiatric: He has a normal mood and affect. His mood appears not anxious. His affect is not angry, not blunt, not labile and not inappropriate. His speech is not rapid and/or pressured, not delayed, not tangential and not slurred. He is not agitated, not aggressive, not hyperactive, not slowed, not withdrawn, not actively hallucinating and not combative. Thought content is not paranoid and not delusional. Cognition and memory are not impaired. He does not express impulsivity or inappropriate judgment. He does not exhibit a depressed mood. He expresses no homicidal and no suicidal ideation. He expresses no suicidal plans  and no homicidal plans. He is communicative. He exhibits normal recent memory and normal remote memory. He is attentive.     Assessment:     1. 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 He has elected the following treatments.     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, with long-term current use of insulin  -     POCT glucose    1.) Patient was instructed to monitor blood glucose twice daily, fasting, post meal and ac dinner or at bedtime. If on MDI will also need to test pre-meal blood sugar. Reminded to bring BG meter or record to each visit for review.  2.) Reviewed pathophysiology of type 2 diabetes, complications related to the disease, importance of annual dilated eye exam and self daily foot examination.  3.) Continue medications as prescribed MDI with Lantus and Novolog; Metformin; . [facility] MyChart or Phone review in 1 week with BG records for adjustment of medication.  4.) Dietician/CDE evaluation for ongoing meal planning, carbohydrate counting, and diabetic education.  5.) Discussed activity, benefits, methods, and precautions. Recommended patient start/continue some form of exercise and increase as tolerated to 60 minutes per day to facilitate weight loss and aid in control of BGs. Also reminded patient of WHO recommendation of 10,000 steps daily as a goal.   6.) A1C, TSH, Lipid Panel, CMP/Renal panel with eGFR and Micro/Creatinine.  7.) Return to clinic in 2 months for follow up.  Advised patient to call clinic with any questions or concerns.     A total of 30 minutes was spent in face to face time, of which 50 % was spent in counseling patient on disease process, complications, treatment, and side effects of medications.    The patient was explained the above plan and given opportunity to ask questions. He understands, chooses and consents to this plan and accepts all the risks, which include but are not limited to the risks mentioned above. He understands the alternative of having no testing, interventions or treatments at this time. He left content and without further questions.

## 2017-09-28 NOTE — TELEPHONE ENCOUNTER
Attempted to contact patient and schedule CPFT prior to apt 10/03/17 no answer and unable to leave message voicemail full.

## 2017-09-28 NOTE — PROGRESS NOTES
Patient missed appointment for cpft ordered by dr markham, new ordered needed to be placed to be scheduled.

## 2017-09-29 LAB
ESTIMATED AVG GLUCOSE: 226 MG/DL
HBA1C MFR BLD HPLC: 9.5 %

## 2017-10-02 ENCOUNTER — TELEPHONE (OUTPATIENT)
Dept: SURGERY | Facility: CLINIC | Age: 62
End: 2017-10-02

## 2017-10-02 ENCOUNTER — TELEPHONE (OUTPATIENT)
Dept: PULMONOLOGY | Facility: CLINIC | Age: 62
End: 2017-10-02

## 2017-10-02 NOTE — TELEPHONE ENCOUNTER
Per Dr. Bishop, if incision is healed, patient doesn't need to come in, patient states that his wound has healed and his PCP looked at it and stated that it was fine.

## 2017-10-02 NOTE — TELEPHONE ENCOUNTER
Attempted to contact patient regarding Cpap, and testing prior to apt, no answer unable to leave message

## 2017-10-02 NOTE — TELEPHONE ENCOUNTER
----- Message from Skylar Whitt sent at 10/2/2017  8:48 AM CDT -----  Contact: pt  Please call pt @ [phone] regarding appt today, pt need to cancel and reschedule for tomorrow or Friday, pt advise next available is not until 10/9, pt states he have transportation on those days.

## 2017-10-04 ENCOUNTER — TELEPHONE (OUTPATIENT)
Dept: INTERNAL MEDICINE | Facility: CLINIC | Age: 62
End: 2017-10-04

## 2017-10-04 NOTE — TELEPHONE ENCOUNTER
Advise doctor is out of the office and will complete form once he return towards the end of the month. States that another provider can complete forms,will resend forms

## 2017-10-04 NOTE — TELEPHONE ENCOUNTER
----- Message from Neyda Bsahir sent at 10/4/2017  9:39 AM CDT -----  Contact: Amadeo/[facility]  Amadeo is calling to see if the fax was received for the knee and back brace, Please call him at [phone] ref# [id number].     Thanks  td

## 2017-10-06 ENCOUNTER — OFFICE VISIT (OUTPATIENT)
Dept: PODIATRY | Facility: CLINIC | Age: 62
End: 2017-10-06
Payer: MEDICARE

## 2017-10-06 VITALS
DIASTOLIC BLOOD PRESSURE: 87 MMHG | HEIGHT: 65 IN | BODY MASS INDEX: 34.56 KG/M2 | SYSTOLIC BLOOD PRESSURE: 144 MMHG | HEART RATE: 89 BPM | WEIGHT: 207.44 LBS

## 2017-10-06 DIAGNOSIS — E11.49 TYPE II DIABETES MELLITUS WITH NEUROLOGICAL MANIFESTATIONS: Primary | ICD-10-CM

## 2017-10-06 DIAGNOSIS — B35.1 DERMATOPHYTOSIS OF NAIL: ICD-10-CM

## 2017-10-06 PROCEDURE — 99499 UNLISTED E&M SERVICE: CPT | Mod: S$GLB,,, | Performed by: PODIATRIST

## 2017-10-06 PROCEDURE — 11721 DEBRIDE NAIL 6 OR MORE: CPT | Mod: Q9,S$GLB,, | Performed by: PODIATRIST

## 2017-10-06 PROCEDURE — 99999 PR PBB SHADOW E&M-EST. PATIENT-LVL III: CPT | Mod: PBBFAC,,, | Performed by: PODIATRIST

## 2017-10-06 NOTE — PROGRESS NOTES
Subjective:     Patient ID: Crow Green is a 62 y.o. male.    Chief Complaint: Routine Foot Care (PCP: Mateo Lambert 9/13/2017) and Foot Pain (needle like pain per the patient )    Crow is a 62 y.o. male who presents to the clinic for evaluation and treatment of high risk feet. Crow has a past medical history of Arthritis; Asthma; Depression; Diabetes mellitus; Diabetes mellitus, type 2 (Diagnosed in 2000); Elevated PSA; and Hypertension. The patient's chief complaint is long, thick toenails.  This patient has documented high risk feet requiring routine maintenance secondary to diabetes mellitis and those secondary complications of diabetes, as mentioned..    PCP: Mateo Lambert, DO    Date Last Seen by PCP: 7/25/17    Current shoe gear:  Affected Foot: Tennis shoes     Unaffected Foot: Tennis shoes    Hemoglobin A1C   Date Value Ref Range Status   09/28/2017 9.5 (H) 4.0 - 5.6 % Final     Comment:     According to ADA guidelines, hemoglobin A1c <7.0% represents  optimal control in non-pregnant diabetic patients. Different  metrics may apply to specific patient populations.   Standards of Medical Care in Diabetes-2016.  For the purpose of screening for the presence of diabetes:  <5.7%     Consistent with the absence of diabetes  5.7-6.4%  Consistent with increasing risk for diabetes   (prediabetes)  >or=6.5%  Consistent with diabetes  Currently, no consensus exists for use of hemoglobin A1c  for diagnosis of diabetes for children.  This Hemoglobin A1c assay has significant interference with fetal   hemoglobin   (HbF). The results are invalid for patients with abnormal amounts of   HbF,   including those with known Hereditary Persistence   of Fetal Hemoglobin. Heterozygous hemoglobin variants (HbAS, HbAC,   HbAD, HbAE, HbA2) do not significantly interfere with this assay;   however, presence of multiple variants in a sample may impact the %   interference.     07/20/2017 9.6 (H) 4.0 - 5.6 % Final      Comment:     According to ADA guidelines, hemoglobin A1c <7.0% represents  optimal control in non-pregnant diabetic patients. Different  metrics may apply to specific patient populations.   Standards of Medical Care in Diabetes-2016.  For the purpose of screening for the presence of diabetes:  <5.7%     Consistent with the absence of diabetes  5.7-6.4%  Consistent with increasing risk for diabetes   (prediabetes)  >or=6.5%  Consistent with diabetes  Currently, no consensus exists for use of hemoglobin A1c  for diagnosis of diabetes for children.  This Hemoglobin A1c assay has significant interference with fetal   hemoglobin   (HbF). The results are invalid for patients with abnormal amounts of   HbF,   including those with known Hereditary Persistence   of Fetal Hemoglobin. Heterozygous hemoglobin variants (HbAS, HbAC,   HbAD, HbAE, HbA2) do not significantly interfere with this assay;   however, presence of multiple variants in a sample may impact the %   interference.     06/16/2017 9.6 (H) 4.0 - 5.6 % Final     Comment:     According to ADA guidelines, hemoglobin A1c <7.0% represents  optimal control in non-pregnant diabetic patients. Different  metrics may apply to specific patient populations.   Standards of Medical Care in Diabetes-2016.  For the purpose of screening for the presence of diabetes:  <5.7%     Consistent with the absence of diabetes  5.7-6.4%  Consistent with increasing risk for diabetes   (prediabetes)  >or=6.5%  Consistent with diabetes  Currently, no consensus exists for use of hemoglobin A1c  for diagnosis of diabetes for children.  This Hemoglobin A1c assay has significant interference with fetal   hemoglobin   (HbF). The results are invalid for patients with abnormal amounts of   HbF,   including those with known Hereditary Persistence   of Fetal Hemoglobin. Heterozygous hemoglobin variants (HbAS, HbAC,   HbAD, HbAE, HbA2) do not significantly interfere with this assay;   however, presence  of multiple variants in a sample may impact the %   interference.             Patient Active Problem List   Diagnosis    ED (erectile dysfunction)    Testicular hypogonadism    Non-proliferative diabetic retinopathy, mild, both eyes    Hypertension    Diabetic polyneuropathy    Abnormal nuclear stress test    Coronary artery disease involving native coronary artery without angina pectoris    Chronic combined systolic and diastolic congestive heart failure    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, with long-term current use of insulin    SANDRITA on CPAP    Asthma    Delayed sleep phase syndrome    Psychophysiological insomnia    Nightmares    Sleep related gastroesophageal reflux disease    Inadequate sleep hygiene    PAF (paroxysmal atrial fibrillation)    At risk for medication noncompliance    Piriformis muscle pain    Obesity (BMI 30.0-34.9)    Indeterminate stage chronic open angle glaucoma    Atypical chest pain    Elevated troponin    Right hip pain    Preop cardiovascular exam    Persistent headaches    Elevated erythrocyte sedimentation rate    Obesity, Class II, BMI 35-39.9, with comorbidity    Headache    Gait instability    Chronic right shoulder pain       Medication List with Changes/Refills   Current Medications    ALCOHOL ANTISEPTIC PADS (ALCOHOL PREP PADS TOP)        AMIODARONE (PACERONE) 200 MG TAB    Take 1 tablet (200 mg total) by mouth once daily.    AMLODIPINE (NORVASC) 10 MG TABLET    Take 1 tablet (10 mg total) by mouth once daily.    AMMONIUM LACTATE 12 % CREA    Apply 1 Act topically 2 (two) times daily.    APIXABAN 5 MG TAB    Take 1 tablet (5 mg total) by mouth 2 (two) times daily.    ASPIRIN (ECOTRIN) 81 MG EC TABLET    Take 1 tablet (81 mg total) by mouth once daily.    ATORVASTATIN (LIPITOR) 40 MG TABLET    Take 1 tablet (40 mg total) by mouth every evening.    BLOOD SUGAR DIAGNOSTIC STRP    1 strip  "by Misc.(Non-Drug; Combo Route) route 6 (six) times daily.    DULOXETINE (CYMBALTA) 30 MG CAPSULE        FLUTICASONE-SALMETEROL 100-50 MCG/DOSE (ADVAIR DISKUS) 100-50 MCG/DOSE DISKUS INHALER    Inhale 1 puff into the lungs 2 (two) times daily. Controller    GABAPENTIN (NEURONTIN) 300 MG CAPSULE    Take 1 capsule (300 mg total) by mouth 3 (three) times daily.    HYDROCODONE-ACETAMINOPHEN 5-325MG (NORCO) 5-325 MG PER TABLET    Take 1 tablet by mouth every 6 (six) hours as needed for Pain.    INSULIN GLARGINE (LANTUS SOLOSTAR) 100 UNIT/ML (3 ML) INPN PEN    Inject 50 Units into the skin every evening.    INSULIN SYRINGE-NEEDLE U-100 1 ML 31 GAUGE X 5/16 SYRG        ISOSORBIDE MONONITRATE (IMDUR) 30 MG 24 HR TABLET    Take 1 tablet (30 mg total) by mouth once daily.    LATANOPROST 0.005 % OPHTHALMIC SOLUTION    Place 1 drop into both eyes every evening.    LISINOPRIL (PRINIVIL,ZESTRIL) 5 MG TABLET    Take 1 tablet (5 mg total) by mouth once daily.    METFORMIN (GLUCOPHAGE) 1000 MG TABLET    Take 1 tablet (1,000 mg total) by mouth 2 (two) times daily with meals.    METOPROLOL SUCCINATE (TOPROL-XL) 50 MG 24 HR TABLET    TAKE 1 TABLET EVERY DAY    NITROGLYCERIN (NITROSTAT) 0.3 MG SL TABLET    Place 1 tablet (0.3 mg total) under the tongue every 5 (five) minutes as needed for Chest pain. No more than 3 tablets in one day.    NOVOLOG FLEXPEN 100 UNIT/ML INPN PEN    ADMINISTER 20 UNITS UNDER THE SKIN THREE TIMES DAILY WITH MEALS    PANTOPRAZOLE (PROTONIX) 40 MG TABLET    Take 1 tablet (40 mg total) by mouth once daily.    PEN NEEDLE, DIABETIC (BD INSULIN PEN NEEDLE UF SHORT) 31 GAUGE X 5/16" NDLE    Inject 1 each into the skin 3 (three) times daily.    PREDNISONE (DELTASONE) 10 MG TABLET    20mg daily (2 pills) by mouth for 2 weeks, then 10mg by mouth daily (1 pill)    SERTRALINE (ZOLOFT) 25 MG TABLET        TIOTROPIUM (SPIRIVA WITH HANDIHALER) 18 MCG INHALATION CAPSULE    Inhale 1 capsule (18 mcg total) into the lungs once " "daily. Controller    TRAZODONE (DESYREL) 150 MG TABLET        TRUE METRIX AIR GLUCOSE METER KIT    TEST FOUR TIMES DAILY BEFORE MEALS  AND EVERY NIGHT    VENTOLIN HFA 90 MCG/ACTUATION INHALER           Review of patient's allergies indicates:  No Known Allergies    Past Surgical History:   Procedure Laterality Date    CHOLECYSTECTOMY         Family History   Problem Relation Age of Onset    Glaucoma Mother     Cataracts Mother     Cataracts Father     Glaucoma Sister     Cataracts Sister     Glaucoma Maternal Aunt     Prostate cancer Neg Hx        Social History     Social History    Marital status: Legally      Spouse name: N/A    Number of children: 5    Years of education: N/A     Occupational History    Not on file.     Social History Main Topics    Smoking status: Never Smoker    Smokeless tobacco: Never Used    Alcohol use No    Drug use: No    Sexual activity: Not Currently     Other Topics Concern    Not on file     Social History Narrative    No narrative on file       Vitals:    10/06/17 1041   BP: (!) 144/87   Pulse: 89   Weight: 94.1 kg (207 lb 7.3 oz)   Height: 5' 5" (1.651 m)   PainSc:   6       Hemoglobin A1C   Date Value Ref Range Status   09/28/2017 9.5 (H) 4.0 - 5.6 % Final     Comment:     According to ADA guidelines, hemoglobin A1c <7.0% represents  optimal control in non-pregnant diabetic patients. Different  metrics may apply to specific patient populations.   Standards of Medical Care in Diabetes-2016.  For the purpose of screening for the presence of diabetes:  <5.7%     Consistent with the absence of diabetes  5.7-6.4%  Consistent with increasing risk for diabetes   (prediabetes)  >or=6.5%  Consistent with diabetes  Currently, no consensus exists for use of hemoglobin A1c  for diagnosis of diabetes for children.  This Hemoglobin A1c assay has significant interference with fetal   hemoglobin   (HbF). The results are invalid for patients with abnormal amounts of "   HbF,   including those with known Hereditary Persistence   of Fetal Hemoglobin. Heterozygous hemoglobin variants (HbAS, HbAC,   HbAD, HbAE, HbA2) do not significantly interfere with this assay;   however, presence of multiple variants in a sample may impact the %   interference.     07/20/2017 9.6 (H) 4.0 - 5.6 % Final     Comment:     According to ADA guidelines, hemoglobin A1c <7.0% represents  optimal control in non-pregnant diabetic patients. Different  metrics may apply to specific patient populations.   Standards of Medical Care in Diabetes-2016.  For the purpose of screening for the presence of diabetes:  <5.7%     Consistent with the absence of diabetes  5.7-6.4%  Consistent with increasing risk for diabetes   (prediabetes)  >or=6.5%  Consistent with diabetes  Currently, no consensus exists for use of hemoglobin A1c  for diagnosis of diabetes for children.  This Hemoglobin A1c assay has significant interference with fetal   hemoglobin   (HbF). The results are invalid for patients with abnormal amounts of   HbF,   including those with known Hereditary Persistence   of Fetal Hemoglobin. Heterozygous hemoglobin variants (HbAS, HbAC,   HbAD, HbAE, HbA2) do not significantly interfere with this assay;   however, presence of multiple variants in a sample may impact the %   interference.     06/16/2017 9.6 (H) 4.0 - 5.6 % Final     Comment:     According to ADA guidelines, hemoglobin A1c <7.0% represents  optimal control in non-pregnant diabetic patients. Different  metrics may apply to specific patient populations.   Standards of Medical Care in Diabetes-2016.  For the purpose of screening for the presence of diabetes:  <5.7%     Consistent with the absence of diabetes  5.7-6.4%  Consistent with increasing risk for diabetes   (prediabetes)  >or=6.5%  Consistent with diabetes  Currently, no consensus exists for use of hemoglobin A1c  for diagnosis of diabetes for children.  This Hemoglobin A1c assay has  significant interference with fetal   hemoglobin   (HbF). The results are invalid for patients with abnormal amounts of   HbF,   including those with known Hereditary Persistence   of Fetal Hemoglobin. Heterozygous hemoglobin variants (HbAS, HbAC,   HbAD, HbAE, HbA2) do not significantly interfere with this assay;   however, presence of multiple variants in a sample may impact the %   interference.         Review of Systems   Constitutional: Negative for chills and fever.   Respiratory: Negative for shortness of breath.    Cardiovascular: Negative for chest pain, palpitations, orthopnea, claudication and leg swelling.   Gastrointestinal: Negative for diarrhea, nausea and vomiting.   Musculoskeletal: Negative for joint pain.   Skin: Negative for rash.   Neurological: Positive for tingling and sensory change. Negative for dizziness, focal weakness and weakness.   Psychiatric/Behavioral: Negative.          Objective:      PHYSICAL EXAM: Apperance: Alert and orient in no distress,well developed, and with good attention to grooming and body habits  Patient presents ambulating in tennis shoes.   LOWER EXTREMITY EXAM:  VASCULAR: Dorsalis pedis pulses 2/4 bilateral and Posterior Tibial pulses 2/4 bilateral. Capillary fill time <3 seconds bilateral. No edema observed bilateral. Varicosities absent bilateral. Skin temperature of the lower extremities is warm to warm, proximal to distal. Hair growth dim bilateral.  DERMATOLOGICAL: No skin rashes, subcutaneous nodules, lesions, or ulcers observed bilateral. Nails 1,2,3,4,5 bilateral elongated, thickened, and discolored with subungual debris. Webspaces 1-4 clean, dry and without evidence of break in skin integrity bilateral. Dry and scaly skin noted plantarly bilateral.   NEUROLOGICAL: Light touch, sharp-dull, proprioception all present and equal bilaterally.  Vibratory sensation diminished at bilateral hallux and navicular. Protective sensation absent at sites as tested with a  [place]-Kimmie 5.07 monofilament.   MUSCULOSKELETAL: Muscle strength is 5/5 for foot inverters, everters, plantarflexors, and dorsiflexors. Muscle tone is normal.         Assessment:       Encounter Diagnoses   Name Primary?    Type II diabetes mellitus with neurological manifestations Yes    Dermatophytosis of nail          Plan:   Type II diabetes mellitus with neurological manifestations    Dermatophytosis of nail      I counseled the patient on his conditions, regarding findings of my examination, my impressions, and usual treatment plan.   Shoe inspection. Diabetic Foot Education. Patient reminded of the importance of good nutrition and blood sugar control to help prevent podiatric complications of diabetes. Patient instructed on proper foot hygeine. We discussed wearing proper shoe gear, daily foot inspections, never walking without protective shoe gear, never putting sharp instruments to feet.    With patient's permission, nails 1-5 bilateral were debrided/trimmed in length and thickness aggressively to their soft tissue attachment mechanically and with electric , removing all offending nail and debris. Patient relates relief following the procedure.  Patient  will continue to monitor the areas daily, inspect feet, wear protective shoe gear when ambulatory, moisturizer to maintain skin integrity. Patient reminded of the importance of good nutrition and blood sugar control to help prevent podiatric complications of diabetes.  Patient to return 3 months or sooner if needed.                 Barb Veras DPM  [facility]

## 2017-10-09 ENCOUNTER — TELEPHONE (OUTPATIENT)
Dept: INTERNAL MEDICINE | Facility: CLINIC | Age: 62
End: 2017-10-09

## 2017-10-09 NOTE — TELEPHONE ENCOUNTER
----- Message from Stacie Lao sent at 10/9/2017  8:27 AM CDT -----  Call pt at [phone]//regarding that he need to go back to theraphy class he not handle things to well//jordi posey

## 2017-10-09 NOTE — TELEPHONE ENCOUNTER
Nurse spoke with pt, pt stated he will contact office back once he returns home with name of doctor.

## 2017-10-09 NOTE — TELEPHONE ENCOUNTER
----- Message from Georgette Flores sent at 10/9/2017 10:07 AM CDT -----  Contact: pt  Pt is returning office's call.

## 2017-10-09 NOTE — TELEPHONE ENCOUNTER
He didn't state. I think just a general referral needed to be scheduled. Its needed for outpatient  Care.

## 2017-10-09 NOTE — TELEPHONE ENCOUNTER
Pt called requesting a referral to be sent to [facility] to resume his therapy, pt stated he's having issues again and needs to restart counseling sessions. Please review

## 2017-10-10 ENCOUNTER — TELEPHONE (OUTPATIENT)
Dept: INTERNAL MEDICINE | Facility: CLINIC | Age: 62
End: 2017-10-10

## 2017-10-12 ENCOUNTER — TELEPHONE (OUTPATIENT)
Dept: PODIATRY | Facility: CLINIC | Age: 62
End: 2017-10-12

## 2017-10-12 NOTE — TELEPHONE ENCOUNTER
Returned the call to  regarding him wanting to be seen as soon as possible for nail care.  stated he was told that his nail care and callous care were not done correctly when he went to get fitted for his shoes. I informed him that his insurance will most likely not cover the nail care before his 3 month wait.  stated understanding but he would like to be informed if there is another opening within a week. I informed him that I will but once again his visit will most  Likely not be covered. There was a mutual understanding stated and the call ended well.

## 2017-10-12 NOTE — TELEPHONE ENCOUNTER
----- Message from Monika Christian MA sent at 10/11/2017  1:50 PM CDT -----  Contact: Pt    Pt insisted on being worked in for nail care and callous care stating that it was not done right and they cannot measure his foot for diabetic shoes due to nails and callouses. Pt was informed that it will not be covered if he comes in sooner than his 3mnth appt, and he insisted that you work him in and call him back.  Monika Christian MA  Office of Dr. Estrella Woods, DPM   Podiatry Department, [facility]     ----- Message -----  From: Annie Muhammad  Sent: 10/11/2017  11:35 AM  To: [facility] Staff    Pt called and requested a callback in regards to diabetic shoes measurements was off when he went to be fitted callback number is..[phone] requested to been seen this week if possible.

## 2017-10-17 ENCOUNTER — PATIENT OUTREACH (OUTPATIENT)
Dept: ADMINISTRATIVE | Facility: HOSPITAL | Age: 62
End: 2017-10-17

## 2017-10-27 ENCOUNTER — PATIENT OUTREACH (OUTPATIENT)
Dept: INTERNAL MEDICINE | Facility: CLINIC | Age: 62
End: 2017-10-27

## 2017-10-31 ENCOUNTER — TELEPHONE (OUTPATIENT)
Dept: INTERNAL MEDICINE | Facility: CLINIC | Age: 62
End: 2017-10-31

## 2017-10-31 NOTE — TELEPHONE ENCOUNTER
----- Message from Jeniffer Montes sent at 10/31/2017  9:30 AM CDT -----  Contact: [facility]  called rg status of knee & back brace..[phone]

## 2017-10-31 NOTE — TELEPHONE ENCOUNTER
Nurse spoke with ori in regarding paperwork for supplies. Nurse informed ori paperwork should be completed by Friday.

## 2017-10-31 NOTE — PROGRESS NOTES
Subjective:       Patient ID: Crow Green is a 62 y.o. male.    Chief Complaint: shoulder pain, headaches, elevated sed rate    Crow was sent to us recently for evaluation for GCA. He c/o significant headaches and tenderness to his left temporal artery, as well as shoulder and hip complaints, plus elevated esr.  He was started on prednisone 60mg/day and sent for biopsy.  Biopsy was negative and Dr. Jimenez started tapering his prednisone.   His headaches have been present for months.  He has chronic stiffness, joint complaints that didn't fit typical PMR symptoms.  He has chronic shoulder pain, right rtc issues, with limited motion. He has Right hip pain and is in PT for that (has been on hold but going back monday).  He has diabetes with associated diabetic retinopathy and polyneuropathy. Spep showed no monoclonal peaks, did have some increased beta globulins.     Today he is feeling good with no complaints.  We sent back to PT to work on his shoulder as well as hip but he has been out due to his therapist being out.  He is resuming next week.  No worsening headaches, no temporal tenderness, no vision issues.  Left shoulder and left hip not involved.  Labs not done today       Review of Systems   Constitutional: Negative for chills, fatigue, fever and unexpected weight change.   HENT: Negative for congestion, mouth sores and rhinorrhea.    Eyes: Negative for pain, discharge and redness.   Respiratory: Negative for cough and shortness of breath.    Cardiovascular: Negative for chest pain and leg swelling.   Gastrointestinal: Negative for abdominal pain, diarrhea, nausea and vomiting.   Endocrine: Negative for cold intolerance and heat intolerance.   Genitourinary: Negative for dysuria.   Musculoskeletal: Positive for arthralgias and myalgias. Negative for joint swelling.   Skin: Negative for rash.   Allergic/Immunologic: Negative for immunocompromised state.   Neurological: Positive for numbness and  headaches. Negative for weakness.   Psychiatric/Behavioral: The patient is not nervous/anxious.          Objective:   /78   Pulse 96   Wt 94.9 kg (209 lb 3.5 oz)   BMI 34.82 kg/m²      Physical Exam   Constitutional: He is oriented to person, place, and time and well-developed, well-nourished, and in no distress.   HENT:   Head: Normocephalic and atraumatic.   Left temple with scar from biopsy mild tenderness   Eyes: Pupils are equal, round, and reactive to light. Right eye exhibits no discharge.   Neck: Normal range of motion.   Cardiovascular: Normal rate, regular rhythm and normal heart sounds.  Exam reveals no friction rub.    Pulmonary/Chest: Effort normal and breath sounds normal. No respiratory distress.   Abdominal: Soft. He exhibits no distension. There is no tenderness.   Lymphadenopathy:     He has no cervical adenopathy.   Neurological: He is alert and oriented to person, place, and time.   Skin: No rash noted. No erythema.     Psychiatric: Mood normal.   Musculoskeletal: Normal range of motion. He exhibits tenderness. He exhibits no edema or deformity.   Right shoulder rom decreased about 110 degree ff, passive rom full but very painful  Left shoulder normal  No synovitis to any joints         No results found for this or any previous visit (from the past 168 hour(s)).   Results for SHAGGY JASON (MRN [id number]) as of 9/25/2017 09:46   Ref. Range 8/29/2017 12:40   IgG - Serum Latest Ref Range: 650 - 1600 mg/dL 1202   IgM Latest Ref Range: 50 - 300 mg/dL 55   IgA Latest Ref Range: 40 - 350 mg/dL 643 (H)   Protein, Serum Latest Ref Range: 6.0 - 8.4 g/dL 7.0   Albumin grams/dl Latest Ref Range: 3.35 - 5.55 g/dL 3.39   Alpha-1 grams/dl Latest Ref Range: 0.17 - 0.41 g/dL 0.28   Alpha-2 grams/dl Latest Ref Range: 0.43 - 0.99 g/dL 0.99   Beta grams/dl Latest Ref Range: 0.50 - 1.10 g/dL 1.15 (H)   Gamma grams/dl Latest Ref Range: 0.67 - 1.58 g/dL 1.19   Pathologist Interpretation SPE Unknown  REVIEWED   Pathologist Interpretation DARSHANA Unknown REVIEWED   [employer] Interp. Unknown SEE COMMENT     Assessment:       1. Persistent headaches    2. Chronic right shoulder pain    3. Right hip pain    4. Elevated erythrocyte sedimentation rate        Impression:    Chronic headaches with recent exacerbation, left-sided--this likely not GCA, temporal artery biopsy is negative, symptoms not typical PMR, chronic shoulder and hip pain on the right, --currently on pred 10mg/d (weaning from 60mg/d)    Right hip pain: djd--has been in PT, out for a while but going back on Monday     Right shoulder pain: worsening, likely has djd with rotator cuff arthropathy involved, could also have referred pain from c spine deg arthritis    Increased sed rate with normal CRP: spep with no monoclonal peaks, he does have increased beta globulins, ESR not done today    Diabetes mellitus with neuropathy and retinopathy-stable    Plan:       Need labs today, will likely continue prednisone taper with 7.5mg/d x 2 wks then 5mg day x 2 weeks   Cont low dose asa  Get labs done today, check esr, crp  If shoulder still an issue next visit we could inject       rtc in 4 wks with reg labs early     Case presented to Dr Jimenez. He was present in the exam room. PE done independently. Impression and Plan done in collaboration    Hx and exam reviewed  with Mrs Sumner, helped develop  Imp/Plan as above and discussed same with Diff. Dx considered. THAI Jimenez MD

## 2017-11-01 ENCOUNTER — OFFICE VISIT (OUTPATIENT)
Dept: RHEUMATOLOGY | Facility: CLINIC | Age: 62
End: 2017-11-01
Payer: MEDICARE

## 2017-11-01 ENCOUNTER — TELEPHONE (OUTPATIENT)
Dept: RHEUMATOLOGY | Facility: CLINIC | Age: 62
End: 2017-11-01

## 2017-11-01 ENCOUNTER — LAB VISIT (OUTPATIENT)
Dept: LAB | Facility: HOSPITAL | Age: 62
End: 2017-11-01
Attending: INTERNAL MEDICINE
Payer: MEDICARE

## 2017-11-01 ENCOUNTER — PROCEDURE VISIT (OUTPATIENT)
Dept: PULMONOLOGY | Facility: CLINIC | Age: 62
End: 2017-11-01
Payer: MEDICARE

## 2017-11-01 VITALS
SYSTOLIC BLOOD PRESSURE: 131 MMHG | BODY MASS INDEX: 34.82 KG/M2 | WEIGHT: 209.19 LBS | HEART RATE: 96 BPM | DIASTOLIC BLOOD PRESSURE: 78 MMHG

## 2017-11-01 DIAGNOSIS — R70.0 ELEVATED ERYTHROCYTE SEDIMENTATION RATE: Chronic | ICD-10-CM

## 2017-11-01 DIAGNOSIS — G89.29 CHRONIC RIGHT SHOULDER PAIN: ICD-10-CM

## 2017-11-01 DIAGNOSIS — R51.9 PERSISTENT HEADACHES: Primary | ICD-10-CM

## 2017-11-01 DIAGNOSIS — M25.551 RIGHT HIP PAIN: ICD-10-CM

## 2017-11-01 DIAGNOSIS — M25.511 CHRONIC RIGHT SHOULDER PAIN: ICD-10-CM

## 2017-11-01 DIAGNOSIS — R51.9 HEADACHE, UNSPECIFIED HEADACHE TYPE: ICD-10-CM

## 2017-11-01 DIAGNOSIS — J45.20 MILD INTERMITTENT ASTHMA WITHOUT COMPLICATION: ICD-10-CM

## 2017-11-01 DIAGNOSIS — R70.0 ELEVATED ERYTHROCYTE SEDIMENTATION RATE: ICD-10-CM

## 2017-11-01 LAB
ALBUMIN SERPL BCP-MCNC: 3.2 G/DL
ALP SERPL-CCNC: 81 U/L
ALT SERPL W/O P-5'-P-CCNC: 40 U/L
ANION GAP SERPL CALC-SCNC: 10 MMOL/L
AST SERPL-CCNC: 24 U/L
BASOPHILS # BLD AUTO: 0.01 K/UL
BASOPHILS NFR BLD: 0.1 %
BILIRUB SERPL-MCNC: 0.3 MG/DL
BUN SERPL-MCNC: 22 MG/DL
CALCIUM SERPL-MCNC: 9.1 MG/DL
CHLORIDE SERPL-SCNC: 101 MMOL/L
CO2 SERPL-SCNC: 26 MMOL/L
CREAT SERPL-MCNC: 1.2 MG/DL
CRP SERPL-MCNC: 12.4 MG/L
DIFFERENTIAL METHOD: ABNORMAL
EOSINOPHIL # BLD AUTO: 0.1 K/UL
EOSINOPHIL NFR BLD: 0.4 %
ERYTHROCYTE [DISTWIDTH] IN BLOOD BY AUTOMATED COUNT: 13.7 %
ERYTHROCYTE [SEDIMENTATION RATE] IN BLOOD BY WESTERGREN METHOD: 36 MM/HR
EST. GFR  (AFRICAN AMERICAN): >60 ML/MIN/1.73 M^2
EST. GFR  (NON AFRICAN AMERICAN): >60 ML/MIN/1.73 M^2
GLUCOSE SERPL-MCNC: 260 MG/DL
HCT VFR BLD AUTO: 44.1 %
HGB BLD-MCNC: 14.8 G/DL
LYMPHOCYTES # BLD AUTO: 3.8 K/UL
LYMPHOCYTES NFR BLD: 29.2 %
MCH RBC QN AUTO: 29.5 PG
MCHC RBC AUTO-ENTMCNC: 33.6 G/DL
MCV RBC AUTO: 88 FL
MONOCYTES # BLD AUTO: 0.8 K/UL
MONOCYTES NFR BLD: 6.4 %
NEUTROPHILS # BLD AUTO: 8.3 K/UL
NEUTROPHILS NFR BLD: 63.9 %
PLATELET # BLD AUTO: 166 K/UL
PMV BLD AUTO: 10 FL
POTASSIUM SERPL-SCNC: 4.3 MMOL/L
PROT SERPL-MCNC: 7.3 G/DL
RBC # BLD AUTO: 5.01 M/UL
SODIUM SERPL-SCNC: 137 MMOL/L
WBC # BLD AUTO: 13.03 K/UL

## 2017-11-01 PROCEDURE — 85025 COMPLETE CBC W/AUTO DIFF WBC: CPT | Mod: PO

## 2017-11-01 PROCEDURE — 94729 DIFFUSING CAPACITY: CPT | Mod: S$GLB,,, | Performed by: INTERNAL MEDICINE

## 2017-11-01 PROCEDURE — 80053 COMPREHEN METABOLIC PANEL: CPT | Mod: PO

## 2017-11-01 PROCEDURE — 94060 EVALUATION OF WHEEZING: CPT | Mod: S$GLB,,, | Performed by: INTERNAL MEDICINE

## 2017-11-01 PROCEDURE — 99214 OFFICE O/P EST MOD 30 MIN: CPT | Mod: S$GLB,,, | Performed by: PHYSICIAN ASSISTANT

## 2017-11-01 PROCEDURE — 85651 RBC SED RATE NONAUTOMATED: CPT | Mod: PO

## 2017-11-01 PROCEDURE — 36415 COLL VENOUS BLD VENIPUNCTURE: CPT | Mod: PO

## 2017-11-01 PROCEDURE — 99999 PR PBB SHADOW E&M-EST. PATIENT-LVL III: CPT | Mod: PBBFAC,,, | Performed by: PHYSICIAN ASSISTANT

## 2017-11-01 PROCEDURE — 94726 PLETHYSMOGRAPHY LUNG VOLUMES: CPT | Mod: S$GLB,,, | Performed by: INTERNAL MEDICINE

## 2017-11-01 PROCEDURE — 86140 C-REACTIVE PROTEIN: CPT

## 2017-11-01 RX ORDER — PREDNISONE 5 MG/1
5 TABLET ORAL DAILY
Qty: 100 TABLET | Refills: 3 | Status: SHIPPED | OUTPATIENT
Start: 2017-11-01 | End: 2018-01-03 | Stop reason: ALTCHOICE

## 2017-11-01 NOTE — PATIENT INSTRUCTIONS
Tentative plan will be to go down on prednisone, will get labs today and I will call you with tapering instructions    Will send in 5mg tablets to pharmacy of prednisone    Get back into PT for your shoulder    Can give shot if needed     See back in 4 weeks with early labs

## 2017-11-03 LAB
POST FEF 25 75: 2.84 L/S (ref 1.68–3.22)
POST FET 100: 7.4 S
POST FEV1 FVC: 85 %
POST FEV1: 2.13 L (ref 2.19–2.91)
POST FIF 50: 1.13 L/S
POST FVC: 2.5 L (ref 2.88–3.69)
POST PEF: 5.74 L/S (ref 6.08–8.36)
PRE DLCO: 16.3 ML/MMHG/MIN (ref 19.72–28.01)
PRE ERV: 0.51 L
PRE FEF 25 75: 2.7 L/S (ref 1.68–3.22)
PRE FET 100: 7.54 S
PRE FEV1 FVC: 83 %
PRE FEV1: 2.07 L (ref 2.19–2.91)
PRE FIF 50: 1.47 L/S
PRE FRC PL: 1.77 L (ref 2.05–3.26)
PRE FVC: 2.48 L (ref 2.88–3.69)
PRE KROGHS K: 4.9 1/MIN
PRE PEF: 7.55 L/S (ref 6.08–8.36)
PRE RV: 1.26 L (ref 1.63–2.42)
PRE SVC: 2.48 L
PRE TLC: 3.74 L (ref 4.97–5.96)
PREDICTED DLCO: 23.86 ML/MMHG/MIN (ref 19.72–28.01)
PREDICTED FEV1 FVC: 78.27 % (ref 73.06–83.48)
PREDICTED FEV1: 2.55 L (ref 2.19–2.91)
PREDICTED FRC N2: 2.66 L (ref 2.05–3.26)
PREDICTED FRC PL: 2.66 L (ref 2.05–3.26)
PREDICTED FVC: 3.29 L (ref 2.88–3.69)
PREDICTED RV: 2.03 L (ref 1.63–2.42)
PREDICTED SVC: 3.36 L
PREDICTED TLC: 5.47 L (ref 4.97–5.96)
PROVOCATION PROTOCOL: ABNORMAL

## 2017-11-09 ENCOUNTER — PATIENT OUTREACH (OUTPATIENT)
Dept: ADMINISTRATIVE | Facility: HOSPITAL | Age: 62
End: 2017-11-09

## 2017-11-10 ENCOUNTER — TELEPHONE (OUTPATIENT)
Dept: INTERNAL MEDICINE | Facility: CLINIC | Age: 62
End: 2017-11-10

## 2017-11-10 NOTE — TELEPHONE ENCOUNTER
----- Message from Carla Dillard sent at 11/7/2017  9:16 AM CST -----  Contact: [facility]   Would like to consult with nurse regarding back and knee brace request. Please call back at [phone].      Thanks,  Carla Dillard

## 2017-11-13 ENCOUNTER — OFFICE VISIT (OUTPATIENT)
Dept: PULMONOLOGY | Facility: CLINIC | Age: 62
End: 2017-11-13
Payer: MEDICARE

## 2017-11-13 VITALS
BODY MASS INDEX: 34.12 KG/M2 | OXYGEN SATURATION: 98 % | HEART RATE: 84 BPM | SYSTOLIC BLOOD PRESSURE: 140 MMHG | HEIGHT: 65 IN | RESPIRATION RATE: 20 BRPM | DIASTOLIC BLOOD PRESSURE: 76 MMHG | WEIGHT: 204.81 LBS

## 2017-11-13 DIAGNOSIS — J45.40 MODERATE PERSISTENT ASTHMA WITHOUT COMPLICATION: ICD-10-CM

## 2017-11-13 DIAGNOSIS — G47.33 OSA ON CPAP: Primary | ICD-10-CM

## 2017-11-13 PROCEDURE — 99214 OFFICE O/P EST MOD 30 MIN: CPT | Mod: 25,S$GLB,, | Performed by: NURSE PRACTITIONER

## 2017-11-13 PROCEDURE — 90686 IIV4 VACC NO PRSV 0.5 ML IM: CPT | Mod: S$GLB,,, | Performed by: INTERNAL MEDICINE

## 2017-11-13 PROCEDURE — 99999 PR PBB SHADOW E&M-EST. PATIENT-LVL III: CPT | Mod: PBBFAC,,, | Performed by: NURSE PRACTITIONER

## 2017-11-13 PROCEDURE — G0008 ADMIN INFLUENZA VIRUS VAC: HCPCS | Mod: S$GLB,,, | Performed by: INTERNAL MEDICINE

## 2017-11-13 RX ORDER — FLUTICASONE PROPIONATE AND SALMETEROL 250; 50 UG/1; UG/1
1 POWDER RESPIRATORY (INHALATION) 2 TIMES DAILY
Qty: 60 EACH | Refills: 11 | Status: SHIPPED | OUTPATIENT
Start: 2017-11-13 | End: 2020-03-23 | Stop reason: SDUPTHER

## 2017-11-13 NOTE — PATIENT INSTRUCTIONS
What Are Snoring and Obstructive Sleep Apnea?  If youve ever had a stuffed-up nose, you know the feeling of trying to breathe through a very narrow passageway. This is what happens in your throat when you snore. While you sleep, structures in your throat partially block your air passage, making the passage narrow and hard to breathe through. If the entire passage becomes blocked and you cant breathe at all, you have sleep apnea.      Snoring Obstructive sleep apnea   Snoring  If your throat structures are too large or the muscles relax too much during sleep, the air passage may be partially blocked. As air from the nose or mouth passes around this blockage, the throat structures vibrate, causing the familiar sound of snoring. At times, this sound can be so loud that snorers wake up others, or even themselves, during the night. Snoring gets worse as more and more of the air passage is blocked.  Obstructive sleep apnea  If the structures completely block the throat, air cant flow to the lungs at all. This is called apnea (meaning no breathing). Since the lungs arent getting fresh air, the brain tells the body to wake up just enough to tighten the muscles and unblock the air passage. With a loud gasp, breathing begins again. This process may be repeated over and over again throughout the night, making your sleep fragmented with a lighter stage of sleep. Even though you do not remember waking up many times during the night to a lighter sleep, you feel tired the next day. The lack of sleep and fresh air can also strain your lungs, heart, and other organs, leading to problems such as high blood pressure, heart attack, or stroke.  Problems in the nose and jaw  Problems in the structure of the nose may obstruct breathing. A crooked (deviated) septum or swollen turbinates can make snoring worse or lead to apnea. Also, a receding jaw may make the tongue sit too far back, so its more likely to block the airway when  youre asleep.        Date Last Reviewed: 7/18/2015  © 0863-4333 [facility]. [address] [place] [address]. All rights reserved. This information is not intended as a substitute for professional medical care. Always follow your healthcare professional's instructions.        Continuous Positive Air Pressure (CPAP)     A mask over the nose gently directs air into the throat to keep the airway open.   Continuous positive air pressure (CPAP) uses gentle air pressure to hold the airway open. CPAP is often the most effective treatment for sleep apnea and severe snoring. It works very well for many people. But keep in mind that it can take several adjustments before the setup is right for you.  How CPAP works  The CPAP machine  is a small portable pump beside the bed. The pump sends air through a hose, which is held over your nose and mouth by a mask. Mild air pressure is gently pushed through your airway. The air pressure nudges sagging tissues aside. This widens the airway so you can breathe better. CPAP may be combined with other kinds of therapy for sleep apnea.  Types of air pressure treatments  There are different types of CPAP. Your doctor or CPAP technician will help you decide which type is best for you:  · Basic CPAP keeps the pressure constant all night long.  · A bilevel device (BiPAP) provides more pressure when you breathe in and less when you breathe out. A BiPAP machine also may be set to provide automatic breaths to maintain breathing if you stop breathing while sleeping.  · An autoCPAP device automatically adjusts pressure throughout the night and in response to changes such as body position, sleep stage, and snoring.  Date Last Reviewed: 8/10/2015  © 3759-3621 [facility]. [address] [place], [place] [id number]. All rights reserved. This information is not intended as a substitute for professional medical care. Always follow your healthcare professional's  instructions.

## 2017-11-13 NOTE — PROGRESS NOTES
Subjective:      Patient ID: Crow Green is a 62 y.o. male.    Chief Complaint: Sleep Apnea and Asthma    HPI: Crow Green is here for follow up for SANDRITA and CPAP complaince assessment.   He is on CPAP of 14 cmH2O pressure.  He is not compliant with CPAP use. Complaince download today reveals 0.0 % of days with greater than 4 hours of device use.   Patient reports benefit from CPAP use and denies snoring and excessive daytime sleepiness.  Patient reports complaint of lacks motivation to wear cpap.      Asthma  Patient presents for evaluation of asthma.   The patient has been previously diagnosed with asthma.   Symptoms currently include chest tightness, dyspnea, non-productive cough and wheezing and occur daily.   Observed precipitants include: exercise.   Current limitations in activity from asthma: some limitation. He cleans house with pacing, no longer cuts his own lawn.   Does he do nebulizer treatments? yes  Does he use an inhaler? yes  Does he use a spacer with MDIs? yes  Current medication: advair 100 mcg, spriva, albuterol neb and inhaler.   ACT score: 16     Previous Report Reviewed: lab reports and office notes     Past Medical History: The following portions of the patient's history were reviewed and updated as appropriate:   He  has a past surgical history that includes Cholecystectomy.  His family history includes Cataracts in his father, mother, and sister; Glaucoma in his maternal aunt, mother, and sister.  He  reports that he has never smoked. He has never used smokeless tobacco. He reports that he does not drink alcohol or use drugs.  He has a current medication list which includes the following prescription(s): alcohol antiseptic pads, amiodarone, amlodipine, ammonium lactate, apixaban, aspirin, atorvastatin, blood sugar diagnostic, duloxetine, gabapentin, hydrocodone-acetaminophen 5-325mg, insulin glargine, insulin syringe-needle u-100, latanoprost, lisinopril, metformin,  metoprolol succinate, novolog flexpen, pantoprazole, pen needle, diabetic, prednisone, sertraline, tiotropium, trazodone, true metrix air glucose meter, ventolin hfa, fluticasone-salmeterol 250-50 mcg/dose, isosorbide mononitrate, and nitroglycerin.  He has No Known Allergies..    The following portions of the patient's history were reviewed and updated as appropriate: allergies, current medications, past family history, past medical history, past social history, past surgical history and problem list.    Review of Systems   Constitutional: Negative for fever, chills, weight loss, weight gain, activity change, appetite change, fatigue and night sweats.   HENT: Negative for postnasal drip, rhinorrhea, sinus pressure, voice change and congestion.    Eyes: Negative for redness and itching.   Respiratory: Negative for snoring, cough, sputum production, chest tightness, shortness of breath, wheezing, orthopnea, asthma nighttime symptoms, dyspnea on extertion, use of rescue inhaler and somnolence.    Cardiovascular: Negative.  Negative for chest pain, palpitations and leg swelling.   Genitourinary: Negative for difficulty urinating and hematuria.   Endocrine: Negative for cold intolerance and heat intolerance.    Musculoskeletal: Negative for arthralgias, gait problem, joint swelling and myalgias.   Skin: Negative.    Gastrointestinal: Negative for nausea, vomiting, abdominal pain and acid reflux.   Neurological: Negative for dizziness, weakness, light-headedness and headaches.   Hematological: Negative for adenopathy. No excessive bruising.   All other systems reviewed and are negative.     Objective:     Physical Exam   Constitutional: He is oriented to person, place, and time. He appears well-developed and well-nourished. He is active and cooperative.  Non-toxic appearance. He does not appear ill. No distress.   HENT:   Head: Normocephalic and atraumatic.   Right Ear: External ear normal.   Left Ear: External ear  "normal.   Nose: Nose normal.   Mouth/Throat: Oropharynx is clear and moist. No oropharyngeal exudate.   Eyes: Conjunctivae are normal.   Neck: Normal range of motion. Neck supple.   Cardiovascular: Normal rate, regular rhythm, normal heart sounds and intact distal pulses.    Pulmonary/Chest: Effort normal and breath sounds normal.   Abdominal: Soft.   Musculoskeletal: He exhibits no edema.   Neurological: He is alert and oriented to person, place, and time. He has normal reflexes.   Skin: Skin is warm and dry.   Psychiatric: He has a normal mood and affect. His behavior is normal. Judgment and thought content normal.   Vitals reviewed.    Vitals:    11/13/17 0859   BP: (!) 140/76   Pulse: 84   Resp: 20   SpO2: 98%   Weight: 92.9 kg (204 lb 12.9 oz)   Height: 5' 5" (1.651 m)     body mass index is 34.08 kg/m².    Personal Diagnostic Review  CPAP download  CPAP 14 cm  Compliance Summary  9/22/2017 - 10/21/2017 (30 days)  Days with Device Usage 5 days  Days without Device Usage 25 days  Percent Days with Device Usage 16.7%  Cumulative Usage 1 hrs. 33 mins. 8 secs.  Maximum Usage (1 Day) 1 hrs. 15 mins. 18 secs.  Average Usage (All Days) 3 mins. 6 secs.  Average Usage (Days Used) 18 mins. 37 secs.  Minimum Usage (1 Day) 50 secs.  Percent of Days with Usage >= 4 Hours 0.0%  Percent of Days with Usage < 4 Hours 100.0%  Date Range   Total Blower Time 9 hrs. 2 mins. 36 secs.  Sleep Therapy Statistics  Average Time in Large Leak Per Day 1 mins.  Average AHI 3.2  CPAP 14.0 cmH2O    Pulmonary function tests: 11/01/2017 FEV1: 2.07  (81 % predicted), FVC:  2.48 (75 % predicted), FEV1/FVC:  84 (107 % predicted), TLC: 3.74 (68 % predicted), RV/TLVC: 16.3 (68 % predicted), DLCO: 16.3 (68 % predicted)   Post bronchodilator: FEV1: 2.13  (83 % predicted), FVC:  2.50 (76 % predicted), FEV1/FVC:  85 (109 % predicted).     Assessment:     1. SANDRITA on CPAP    2. Moderate persistent asthma without complication      Orders Placed This " "Encounter   Procedures    HME - OTHER     Please change to auto CPAP 6-14 cm     Order Specific Question:   Type of Equipment:     Answer:   CPAP     Order Specific Question:   Height:     Answer:   5' 5" (1.651 m)     Order Specific Question:   Weight:     Answer:   92.9 kg (204 lb 12.9 oz)     Order Specific Question:   Does patient have medical equipment at home?     Answer:   CPAP     Order Specific Question:   Vendor:     Answer:   [facility]     Order Specific Question:   Expected Date of Delivery:     Answer:   11/13/2017    Influenza - Quadrivalent (3 years & older) (PF)    Spirometry with/without bronchodilator     Standing Status:   Future     Standing Expiration Date:   11/13/2018     Plan:     Problem List Items Addressed This Visit     Asthma     Not controlled ACT score 16  Increased Advair from 100mcg to 250mcg   Continue spirva daily, albuterol nebs/inhaler if needed.   Prednisone 5 mg daily   FEV1: 2.07  (81 % predicted).           Relevant Medications    fluticasone-salmeterol 250-50 mcg/dose (ADVAIR DISKUS) 250-50 mcg/dose diskus inhaler    Other Relevant Orders    Spirometry with/without bronchodilator    SANDRITA on CPAP - Primary     Remains not compliant with CPAP 14 cm "forgets to put on"  Severe obstructive sleep apnea .9   Changed to auto CPAP to determine if lower pressure will still be optimal and help with compliance.          Relevant Orders    HME - OTHER         (ELINA - [facility]  Reviewed therapeutic goals for positive airway pressure therapy Auto CPAP  Ideal is usage 100% of nights for 6 - 8 hours per night. Minimum usage is 70% of night for at least 4 hours per night used. Pateint expressed understanding.     Return in about 2 months (around 1/13/2018).    "

## 2017-11-13 NOTE — ASSESSMENT & PLAN NOTE
"Remains not compliant with CPAP 14 cm "forgets to put on"  Severe obstructive sleep apnea .9   Changed to auto CPAP to determine if lower pressure will still be optimal and help with compliance.   "

## 2017-11-13 NOTE — ASSESSMENT & PLAN NOTE
Not controlled ACT score 16  Increased Advair from 100mcg to 250mcg   Continue spirva daily, albuterol nebs/inhaler if needed.   Prednisone 5 mg daily   FEV1: 2.07  (81 % predicted).

## 2017-11-15 RX ORDER — LISINOPRIL 5 MG/1
5 TABLET ORAL DAILY
Qty: 90 TABLET | Refills: 2 | Status: SHIPPED | OUTPATIENT
Start: 2017-11-15 | End: 2018-06-14

## 2017-11-20 ENCOUNTER — PATIENT OUTREACH (OUTPATIENT)
Dept: ADMINISTRATIVE | Facility: HOSPITAL | Age: 62
End: 2017-11-20

## 2017-11-21 ENCOUNTER — TELEPHONE (OUTPATIENT)
Dept: INTERNAL MEDICINE | Facility: CLINIC | Age: 62
End: 2017-11-21

## 2017-11-21 NOTE — TELEPHONE ENCOUNTER
----- Message from Adrienne Collins sent at 11/21/2017  9:12 AM CST -----  Contact: diabetic supplies- [phone] Ref# [id number]  Would like to consult with nurse about diabetic supply order for back and knee brace. Please call back at [phone] Ref # [id number] thx lj

## 2017-11-21 NOTE — TELEPHONE ENCOUNTER
Nurse returned call, informed that pt needs to schedule appt with doctor to discuss needing brace.

## 2017-12-05 ENCOUNTER — TELEPHONE (OUTPATIENT)
Dept: RHEUMATOLOGY | Facility: CLINIC | Age: 62
End: 2017-12-05

## 2017-12-06 ENCOUNTER — OFFICE VISIT (OUTPATIENT)
Dept: OPHTHALMOLOGY | Facility: CLINIC | Age: 62
End: 2017-12-06
Payer: MEDICARE

## 2017-12-06 ENCOUNTER — OFFICE VISIT (OUTPATIENT)
Dept: RHEUMATOLOGY | Facility: CLINIC | Age: 62
End: 2017-12-06
Payer: MEDICARE

## 2017-12-06 ENCOUNTER — LAB VISIT (OUTPATIENT)
Dept: LAB | Facility: HOSPITAL | Age: 62
End: 2017-12-06
Attending: INTERNAL MEDICINE
Payer: MEDICARE

## 2017-12-06 VITALS
WEIGHT: 211 LBS | HEART RATE: 73 BPM | BODY MASS INDEX: 35.11 KG/M2 | DIASTOLIC BLOOD PRESSURE: 81 MMHG | SYSTOLIC BLOOD PRESSURE: 139 MMHG

## 2017-12-06 DIAGNOSIS — H40.1134 PRIMARY OPEN ANGLE GLAUCOMA OF BOTH EYES, INDETERMINATE STAGE: Primary | ICD-10-CM

## 2017-12-06 DIAGNOSIS — G89.29 CHRONIC RIGHT SHOULDER PAIN: ICD-10-CM

## 2017-12-06 DIAGNOSIS — R51.9 HEADACHE, UNSPECIFIED HEADACHE TYPE: ICD-10-CM

## 2017-12-06 DIAGNOSIS — M25.551 RIGHT HIP PAIN: ICD-10-CM

## 2017-12-06 DIAGNOSIS — R51.9 PERSISTENT HEADACHES: Primary | ICD-10-CM

## 2017-12-06 DIAGNOSIS — R70.0 ELEVATED ERYTHROCYTE SEDIMENTATION RATE: Chronic | ICD-10-CM

## 2017-12-06 DIAGNOSIS — M25.511 CHRONIC RIGHT SHOULDER PAIN: ICD-10-CM

## 2017-12-06 DIAGNOSIS — R70.0 ELEVATED ERYTHROCYTE SEDIMENTATION RATE: ICD-10-CM

## 2017-12-06 LAB
ALBUMIN SERPL BCP-MCNC: 3.2 G/DL
ALP SERPL-CCNC: 78 U/L
ALT SERPL W/O P-5'-P-CCNC: 27 U/L
ANION GAP SERPL CALC-SCNC: 12 MMOL/L
AST SERPL-CCNC: 24 U/L
BASOPHILS # BLD AUTO: 0.01 K/UL
BASOPHILS NFR BLD: 0.1 %
BILIRUB SERPL-MCNC: 0.2 MG/DL
BUN SERPL-MCNC: 18 MG/DL
CALCIUM SERPL-MCNC: 9.1 MG/DL
CHLORIDE SERPL-SCNC: 103 MMOL/L
CO2 SERPL-SCNC: 26 MMOL/L
CREAT SERPL-MCNC: 1.1 MG/DL
CRP SERPL-MCNC: 5.8 MG/L
DIFFERENTIAL METHOD: ABNORMAL
EOSINOPHIL # BLD AUTO: 0.1 K/UL
EOSINOPHIL NFR BLD: 0.7 %
ERYTHROCYTE [DISTWIDTH] IN BLOOD BY AUTOMATED COUNT: 13.8 %
ERYTHROCYTE [SEDIMENTATION RATE] IN BLOOD BY WESTERGREN METHOD: 41 MM/HR
EST. GFR  (AFRICAN AMERICAN): >60 ML/MIN/1.73 M^2
EST. GFR  (NON AFRICAN AMERICAN): >60 ML/MIN/1.73 M^2
GLUCOSE SERPL-MCNC: 188 MG/DL
HCT VFR BLD AUTO: 42.1 %
HGB BLD-MCNC: 13.9 G/DL
LYMPHOCYTES # BLD AUTO: 3.4 K/UL
LYMPHOCYTES NFR BLD: 30.3 %
MCH RBC QN AUTO: 29.4 PG
MCHC RBC AUTO-ENTMCNC: 33 G/DL
MCV RBC AUTO: 89 FL
MONOCYTES # BLD AUTO: 0.7 K/UL
MONOCYTES NFR BLD: 6.3 %
NEUTROPHILS # BLD AUTO: 7 K/UL
NEUTROPHILS NFR BLD: 62.6 %
PLATELET # BLD AUTO: 194 K/UL
PMV BLD AUTO: 10.2 FL
POTASSIUM SERPL-SCNC: 4.4 MMOL/L
PROT SERPL-MCNC: 7.4 G/DL
RBC # BLD AUTO: 4.73 M/UL
SODIUM SERPL-SCNC: 141 MMOL/L
WBC # BLD AUTO: 11.2 K/UL

## 2017-12-06 PROCEDURE — 80053 COMPREHEN METABOLIC PANEL: CPT | Mod: PO

## 2017-12-06 PROCEDURE — 85025 COMPLETE CBC W/AUTO DIFF WBC: CPT | Mod: PO

## 2017-12-06 PROCEDURE — 92012 INTRM OPH EXAM EST PATIENT: CPT | Mod: S$GLB,,, | Performed by: OPTOMETRIST

## 2017-12-06 PROCEDURE — 99999 PR PBB SHADOW E&M-EST. PATIENT-LVL III: CPT | Mod: PBBFAC,,, | Performed by: PHYSICIAN ASSISTANT

## 2017-12-06 PROCEDURE — 36415 COLL VENOUS BLD VENIPUNCTURE: CPT | Mod: PO

## 2017-12-06 PROCEDURE — 99999 PR PBB SHADOW E&M-EST. PATIENT-LVL II: CPT | Mod: PBBFAC,,, | Performed by: OPTOMETRIST

## 2017-12-06 PROCEDURE — 85651 RBC SED RATE NONAUTOMATED: CPT | Mod: PO

## 2017-12-06 PROCEDURE — 86140 C-REACTIVE PROTEIN: CPT

## 2017-12-06 PROCEDURE — 20610 DRAIN/INJ JOINT/BURSA W/O US: CPT | Mod: RT,S$GLB,, | Performed by: PHYSICIAN ASSISTANT

## 2017-12-06 PROCEDURE — 99214 OFFICE O/P EST MOD 30 MIN: CPT | Mod: 25,S$GLB,, | Performed by: PHYSICIAN ASSISTANT

## 2017-12-06 RX ORDER — METHYLPREDNISOLONE ACETATE 80 MG/ML
80 INJECTION, SUSPENSION INTRA-ARTICULAR; INTRALESIONAL; INTRAMUSCULAR; SOFT TISSUE
Status: COMPLETED | OUTPATIENT
Start: 2017-12-06 | End: 2017-12-06

## 2017-12-06 RX ORDER — BETAMETHASONE SODIUM PHOSPHATE AND BETAMETHASONE ACETATE 3; 3 MG/ML; MG/ML
3 INJECTION, SUSPENSION INTRA-ARTICULAR; INTRALESIONAL; INTRAMUSCULAR; SOFT TISSUE
Status: COMPLETED | OUTPATIENT
Start: 2017-12-06 | End: 2017-12-06

## 2017-12-06 RX ORDER — LATANOPROST 50 UG/ML
1 SOLUTION/ DROPS OPHTHALMIC NIGHTLY
Qty: 1 BOTTLE | Refills: 4 | Status: SHIPPED | OUTPATIENT
Start: 2017-12-06 | End: 2018-03-05 | Stop reason: SDUPTHER

## 2017-12-06 RX ADMIN — BETAMETHASONE SODIUM PHOSPHATE AND BETAMETHASONE ACETATE 3 MG: 3; 3 INJECTION, SUSPENSION INTRA-ARTICULAR; INTRALESIONAL; INTRAMUSCULAR; SOFT TISSUE at 10:12

## 2017-12-06 RX ADMIN — METHYLPREDNISOLONE ACETATE 80 MG: 80 INJECTION, SUSPENSION INTRA-ARTICULAR; INTRALESIONAL; INTRAMUSCULAR; SOFT TISSUE at 10:12

## 2017-12-06 NOTE — PROGRESS NOTES
Subjective:       Patient ID: Crow Green is a 62 y.o. male.    Chief Complaint: shoulder pain, headaches, elevated sed rate    Crow was sent to us recently for evaluation for GCA. He c/o significant headaches and tenderness to his left temporal artery, as well as shoulder and hip complaints, plus elevated esr.  He was started on prednisone 60mg/day and sent for biopsy.  Biopsy was negative and Dr. Jimenez started tapering his prednisone.   His headaches have been present for months.  He has chronic stiffness, joint complaints that didn't fit typical PMR symptoms.  He has chronic shoulder pain, right rtc issues, with limited motion. He has Right hip pain and is in PT for that (has been on hold but going back monday).  He has diabetes with associated diabetic retinopathy and polyneuropathy. Spep showed no monoclonal peaks, did have some increased beta globulins.     He complains of right shoulder pain today worsening and lately he has had headaches in the morning that radiate from the front to the back of his head usually right sided, no temporal tenderness. He states he has blurry vision and sometimes it is spotty. Seeing eye MD today.  Left shoulder and left hip not involved.  We have been tapering his prednisone down and he is now on 5mg/day.  His esr/crp have increased slightly as we have tapered his steroid. Today his pain is 5/10 in his shoulder.       Review of Systems   Constitutional: Negative for chills, fatigue, fever and unexpected weight change.   HENT: Negative for congestion, mouth sores and rhinorrhea.    Eyes: Positive for visual disturbance. Negative for pain, discharge and redness.   Respiratory: Negative for cough and shortness of breath.    Cardiovascular: Negative for chest pain and leg swelling.   Gastrointestinal: Negative for abdominal pain, diarrhea, nausea and vomiting.   Endocrine: Negative for cold intolerance and heat intolerance.   Genitourinary: Negative for dysuria.    Musculoskeletal: Positive for arthralgias and myalgias. Negative for joint swelling.   Skin: Negative for rash.   Allergic/Immunologic: Negative for immunocompromised state.   Neurological: Positive for numbness and headaches. Negative for weakness.   Psychiatric/Behavioral: The patient is not nervous/anxious.          Objective:   /81   Pulse 73   Wt 95.7 kg (210 lb 15.7 oz)   BMI 35.11 kg/m²      Physical Exam   Constitutional: He is oriented to person, place, and time and well-developed, well-nourished, and in no distress.   HENT:   Head: Normocephalic and atraumatic.   Left temple with scar from biopsy mild tenderness   Eyes: Pupils are equal, round, and reactive to light. Right eye exhibits no discharge.   Neck: Normal range of motion.   Cardiovascular: Normal rate, regular rhythm and normal heart sounds.  Exam reveals no friction rub.    Pulmonary/Chest: Effort normal and breath sounds normal. No respiratory distress.   Abdominal: Soft. He exhibits no distension. There is no tenderness.   Lymphadenopathy:     He has no cervical adenopathy.   Neurological: He is alert and oriented to person, place, and time.   Skin: No rash noted. No erythema.     Psychiatric: Mood normal.   Musculoskeletal: Normal range of motion. He exhibits tenderness. He exhibits no edema or deformity.   Right shoulder rom decreased about 110 degree ff, passive rom full but very painful, tender around his ac joint  +impingement sign  Left shoulder normal  No synovitis to any joints         No results found for this or any previous visit (from the past 168 hour(s)).   Results for SHAGGY JASON (MRN [id number]) as of 9/25/2017 09:46   Ref. Range 8/29/2017 12:40   IgG - Serum Latest Ref Range: 650 - 1600 mg/dL 1202   IgM Latest Ref Range: 50 - 300 mg/dL 55   IgA Latest Ref Range: 40 - 350 mg/dL 643 (H)   Protein, Serum Latest Ref Range: 6.0 - 8.4 g/dL 7.0   Albumin grams/dl Latest Ref Range: 3.35 - 5.55 g/dL 3.39   Alpha-1  grams/dl Latest Ref Range: 0.17 - 0.41 g/dL 0.28   Alpha-2 grams/dl Latest Ref Range: 0.43 - 0.99 g/dL 0.99   Beta grams/dl Latest Ref Range: 0.50 - 1.10 g/dL 1.15 (H)   Gamma grams/dl Latest Ref Range: 0.67 - 1.58 g/dL 1.19   Pathologist Interpretation SPE Unknown REVIEWED   Pathologist Interpretation DARSHANA Unknown REVIEWED   Immunofix Interp. Unknown SEE COMMENT     Assessment:       1. Persistent headaches    2. Elevated erythrocyte sedimentation rate    3. Chronic right shoulder pain        Impression:    Chronic headaches with recent exacerbation, left-sided- not felt to be GCA, temporal artery biopsy is negative, symptoms not typical PMR, chronic shoulder and hip pain on the right, --currently on pred 5mg/d (weaning from 60mg/d); headaches returned mainly right sided radiating ant to posterior better with tylenol; c/o spotty and blurry vision sometimes    Right hip pain: djd--has been in PT, chronic,    Right shoulder pain: worsening, likely has djd with rotator cuff arthropathy involved,     Increased sed rate/crp : unsure cause, spep with no monoclonal peaks, he does have increased beta globulins,     Diabetes mellitus with neuropathy and retinopathy-stable, could be contributing to esr/crp?    Plan:       Labs today, inflammatory markers  Inject right shoulder today subacromial bursa  Stay at 5mg prednisone daily for now  See eye md today   Cont low dose asa    Procedure note: After verbal consent was obtained.  The right subacromial shoulder was prepared with sterile prep.  The skin was anesthetized with 1% ethyl chloride.  The shoulder joint was injected with 2.5 cc of 1% lidocaine to anesthetize the joint.  The shoulder joint was then injected with 3mg celestone/soluspan, 80mg  Depo-Medrol and 1.0 mL of 1% lidocaine.  Hemostasis was obtained.  The patient tolerated procedure well with no complications.  Patient discharged with icing instructions    rtc in 4 wks with reg labs early     Case presented to   [facility]. He was present in the exam room. PE done independently. Impression and Plan done in collaboration  Hx and exam reviewed  In room with Mrs Sumner, helped develop  Imp/Plan as above and discussed same with Diff. Dx considered and best approach. Will keep prednisone at 5 mg for now, follow lab, inject R sub acr. bursa. THAI Jimenez MD

## 2017-12-06 NOTE — PROGRESS NOTES
HPI     Last [facility] exam 08/31/2017  3 month IOP check  COAG  Medication: Latanoprost 0.005% qhs OU  Needs a refill on Latanoprost   Last full testing was last March  Diabetic  No complaints    Last used drops over a week ago.  He's out of drops.    Last edited by SOILA Dias, OD on 12/6/2017 10:42 AM. (History)            Assessment /Plan     For exam results, see Encounter Report.    Primary open angle glaucoma of both eyes, indeterminate stage  -     latanoprost 0.005 % ophthalmic solution; Place 1 drop into both eyes every evening.  Dispense: 1 Bottle; Refill: 4      Good IOP control OU.  RTC 3 months for full dilation, GOCT, HVF, etc.

## 2018-01-01 NOTE — PROGRESS NOTES
Pre visit chart audit completed.       Throat pain x 3 days, tactile temperature x 2, Aleve one pill PTA. No medical/surgical hx. IUTD

## 2018-01-03 ENCOUNTER — OFFICE VISIT (OUTPATIENT)
Dept: INTERNAL MEDICINE | Facility: CLINIC | Age: 63
End: 2018-01-03
Payer: MEDICARE

## 2018-01-03 ENCOUNTER — LAB VISIT (OUTPATIENT)
Dept: LAB | Facility: HOSPITAL | Age: 63
End: 2018-01-03
Attending: FAMILY MEDICINE
Payer: MEDICARE

## 2018-01-03 VITALS
SYSTOLIC BLOOD PRESSURE: 120 MMHG | RESPIRATION RATE: 18 BRPM | WEIGHT: 209.44 LBS | DIASTOLIC BLOOD PRESSURE: 72 MMHG | BODY MASS INDEX: 34.89 KG/M2 | TEMPERATURE: 96 F | OXYGEN SATURATION: 96 % | HEART RATE: 94 BPM | HEIGHT: 65 IN

## 2018-01-03 DIAGNOSIS — I48.0 PAF (PAROXYSMAL ATRIAL FIBRILLATION): ICD-10-CM

## 2018-01-03 DIAGNOSIS — I50.42 CHRONIC COMBINED SYSTOLIC AND DIASTOLIC CONGESTIVE HEART FAILURE: ICD-10-CM

## 2018-01-03 DIAGNOSIS — Z12.11 COLON CANCER SCREENING: Primary | ICD-10-CM

## 2018-01-03 PROBLEM — R79.89 ELEVATED TROPONIN: Status: RESOLVED | Noted: 2017-07-12 | Resolved: 2018-01-03

## 2018-01-03 PROBLEM — M79.18 PIRIFORMIS MUSCLE PAIN: Status: RESOLVED | Noted: 2017-05-17 | Resolved: 2018-01-03

## 2018-01-03 PROBLEM — Z01.810 PREOP CARDIOVASCULAR EXAM: Status: RESOLVED | Noted: 2017-08-29 | Resolved: 2018-01-03

## 2018-01-03 PROBLEM — R51.9 PERSISTENT HEADACHES: Chronic | Status: RESOLVED | Noted: 2017-08-29 | Resolved: 2018-01-03

## 2018-01-03 PROBLEM — R07.89 ATYPICAL CHEST PAIN: Status: RESOLVED | Noted: 2017-07-12 | Resolved: 2018-01-03

## 2018-01-03 PROBLEM — R51.9 HEADACHE: Status: RESOLVED | Noted: 2017-09-01 | Resolved: 2018-01-03

## 2018-01-03 LAB
ANION GAP SERPL CALC-SCNC: 9 MMOL/L
BUN SERPL-MCNC: 12 MG/DL
CALCIUM SERPL-MCNC: 9.5 MG/DL
CHLORIDE SERPL-SCNC: 105 MMOL/L
CO2 SERPL-SCNC: 30 MMOL/L
CREAT SERPL-MCNC: 0.8 MG/DL
EST. GFR  (AFRICAN AMERICAN): >60 ML/MIN/1.73 M^2
EST. GFR  (NON AFRICAN AMERICAN): >60 ML/MIN/1.73 M^2
ESTIMATED AVG GLUCOSE: 249 MG/DL
GLUCOSE SERPL-MCNC: 130 MG/DL
HBA1C MFR BLD HPLC: 10.3 %
POTASSIUM SERPL-SCNC: 4.1 MMOL/L
SODIUM SERPL-SCNC: 144 MMOL/L

## 2018-01-03 PROCEDURE — 99999 PR PBB SHADOW E&M-EST. PATIENT-LVL III: CPT | Mod: PBBFAC,,, | Performed by: FAMILY MEDICINE

## 2018-01-03 PROCEDURE — 83036 HEMOGLOBIN GLYCOSYLATED A1C: CPT

## 2018-01-03 PROCEDURE — 99214 OFFICE O/P EST MOD 30 MIN: CPT | Mod: S$GLB,,, | Performed by: FAMILY MEDICINE

## 2018-01-03 PROCEDURE — 80048 BASIC METABOLIC PNL TOTAL CA: CPT | Mod: PO

## 2018-01-03 PROCEDURE — 36415 COLL VENOUS BLD VENIPUNCTURE: CPT | Mod: PO

## 2018-01-03 RX ORDER — DOXEPIN HYDROCHLORIDE 150 MG/1
CAPSULE ORAL
COMMUNITY
Start: 2017-12-28 | End: 2018-09-13

## 2018-01-03 NOTE — PROGRESS NOTES
Subjective:       Patient ID: Crow Green is a 62 y.o. male.    Chief Complaint: Follow-up    Diabetes   He presents for his follow-up diabetic visit. He has type 2 diabetes mellitus. His disease course has been stable. There are no hypoglycemic associated symptoms. Pertinent negatives for hypoglycemia include no dizziness. There are no diabetic associated symptoms. Pertinent negatives for diabetes include no chest pain. There are no hypoglycemic complications. Symptoms are stable. Diabetic complications include peripheral neuropathy. Current diabetic treatment includes intensive insulin program and oral agent (monotherapy). He is compliant with treatment all of the time. His weight is stable. He is following a generally healthy diet. He participates in exercise intermittently. There is no change in his home blood glucose trend. An ACE inhibitor/angiotensin II receptor blocker is being taken. Eye exam is current.       Family History   Problem Relation Age of Onset    Glaucoma Mother     Cataracts Mother     Cataracts Father     Glaucoma Sister     Cataracts Sister     Glaucoma Maternal Aunt     Prostate cancer Neg Hx        Current Outpatient Prescriptions:     ALCOHOL ANTISEPTIC PADS (ALCOHOL PREP PADS TOP), , Disp: , Rfl:     amiodarone (PACERONE) 200 MG Tab, Take 1 tablet (200 mg total) by mouth once daily., Disp: 30 tablet, Rfl: 11    amlodipine (NORVASC) 10 MG tablet, Take 1 tablet (10 mg total) by mouth once daily., Disp: 90 tablet, Rfl: 3    ammonium lactate 12 % Crea, Apply 1 Act topically 2 (two) times daily. (Patient taking differently: Apply 1 Act topically once daily. ), Disp: 1 Tube, Rfl: 3    apixaban 5 mg Tab, Take 1 tablet (5 mg total) by mouth 2 (two) times daily., Disp: 60 tablet, Rfl: 1    aspirin (ECOTRIN) 81 MG EC tablet, Take 1 tablet (81 mg total) by mouth once daily., Disp: , Rfl: 0    atorvastatin (LIPITOR) 40 MG tablet, Take 1 tablet (40 mg total) by mouth every  "evening., Disp: 90 tablet, Rfl: 3    blood sugar diagnostic Strp, 1 strip by Misc.(Non-Drug; Combo Route) route 6 (six) times daily., Disp: 160 strip, Rfl: 11    doxepin (SINEQUAN) 150 MG Cap, , Disp: , Rfl:     duloxetine (CYMBALTA) 30 MG capsule, , Disp: , Rfl:     fluticasone-salmeterol 250-50 mcg/dose (ADVAIR DISKUS) 250-50 mcg/dose diskus inhaler, Inhale 1 puff into the lungs 2 (two) times daily. Controller, Disp: 60 each, Rfl: 11    gabapentin (NEURONTIN) 300 MG capsule, Take 1 capsule (300 mg total) by mouth 3 (three) times daily., Disp: 270 capsule, Rfl: 3    hydrocodone-acetaminophen 5-325mg (NORCO) 5-325 mg per tablet, Take 1 tablet by mouth every 6 (six) hours as needed for Pain., Disp: 10 tablet, Rfl: 0    insulin glargine (LANTUS SOLOSTAR) 100 unit/mL (3 mL) InPn pen, Inject 50 Units into the skin every evening., Disp: 2 Box, Rfl: 11    insulin syringe-needle U-100 1 mL 31 gauge x 5/16 Syrg, , Disp: , Rfl:     isosorbide mononitrate (IMDUR) 30 MG 24 hr tablet, Take 1 tablet (30 mg total) by mouth once daily., Disp: 30 tablet, Rfl: 0    latanoprost 0.005 % ophthalmic solution, Place 1 drop into both eyes every evening., Disp: 1 Bottle, Rfl: 4    lisinopril (PRINIVIL,ZESTRIL) 5 MG tablet, TAKE 1 TABLET (5 MG TOTAL) BY MOUTH ONCE DAILY., Disp: 90 tablet, Rfl: 2    metformin (GLUCOPHAGE) 1000 MG tablet, Take 1 tablet (1,000 mg total) by mouth 2 (two) times daily with meals., Disp: 180 tablet, Rfl: 3    metoprolol succinate (TOPROL-XL) 50 MG 24 hr tablet, TAKE 1 TABLET EVERY DAY, Disp: 30 tablet, Rfl: 5    NOVOLOG FLEXPEN 100 unit/mL InPn pen, ADMINISTER 20 UNITS UNDER THE SKIN THREE TIMES DAILY WITH MEALS, Disp: 15 mL, Rfl: 0    pantoprazole (PROTONIX) 40 MG tablet, Take 1 tablet (40 mg total) by mouth once daily., Disp: 30 tablet, Rfl: 11    pen needle, diabetic (BD INSULIN PEN NEEDLE UF SHORT) 31 gauge x 5/16" Ndle, Inject 1 each into the skin 3 (three) times daily., Disp: 90 each, Rfl: " "11    sertraline (ZOLOFT) 25 MG tablet, , Disp: , Rfl:     tiotropium (SPIRIVA WITH HANDIHALER) 18 mcg inhalation capsule, Inhale 1 capsule (18 mcg total) into the lungs once daily. Controller, Disp: 90 capsule, Rfl: 3    trazodone (DESYREL) 150 MG tablet, , Disp: , Rfl:     TRUE METRIX AIR GLUCOSE METER kit, TEST FOUR TIMES DAILY BEFORE MEALS  AND EVERY NIGHT, Disp: 1 each, Rfl: 0    VENTOLIN HFA 90 mcg/actuation inhaler, , Disp: , Rfl:     nitroGLYCERIN (NITROSTAT) 0.3 MG SL tablet, Place 1 tablet (0.3 mg total) under the tongue every 5 (five) minutes as needed for Chest pain. No more than 3 tablets in one day., Disp: 30 tablet, Rfl: 1    Review of Systems   Constitutional: Negative for chills and fever.   Eyes: Negative for visual disturbance.   Respiratory: Negative for cough and shortness of breath.    Cardiovascular: Negative for chest pain.   Gastrointestinal: Negative for abdominal pain.   Neurological: Negative for dizziness.       Objective:   /72 (BP Location: Right arm, Patient Position: Sitting, BP Method: X-Large (Manual))   Pulse 94   Temp 96 °F (35.6 °C) (Tympanic)   Resp 18   Ht 5' 5" (1.651 m)   Wt 95 kg (209 lb 7 oz)   SpO2 96%   BMI 34.85 kg/m²      Physical Exam   Constitutional: He is oriented to person, place, and time. He appears well-developed and well-nourished. No distress.   HENT:   Head: Normocephalic and atraumatic.   Nose: Nose normal.   Eyes: Conjunctivae and EOM are normal. Pupils are equal, round, and reactive to light. Right eye exhibits no discharge. Left eye exhibits no discharge.   Neck: No thyromegaly present.   Cardiovascular: Normal rate and normal heart sounds.  An irregularly irregular rhythm present.   No murmur heard.  Pulmonary/Chest: Effort normal and breath sounds normal. No respiratory distress. He has no wheezes.   Abdominal: Soft. He exhibits no distension.   Musculoskeletal: He exhibits no edema.   Neurological: He is alert and oriented to " person, place, and time. Coordination normal.   Skin: Skin is warm and dry. No rash noted. He is not diaphoretic.   Psychiatric: He has a normal mood and affect. His behavior is normal.   Vitals reviewed.      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  Blood pressure well controlled.         PAF (paroxysmal atrial fibrillation)    Current Assessment & Plan     Continues to have regular rhythm and he is on Eliquis and amiodarone.  Asymptomatic and rate controlled.           Other Visit Diagnoses     Colon cancer screening    -  Primary    Relevant Orders    Case request GI: COLONOSCOPY (Completed)            No Follow-up on file.

## 2018-01-03 NOTE — ASSESSMENT & PLAN NOTE
Continues to have regular rhythm and he is on Eliquis and amiodarone.  Asymptomatic and rate controlled.

## 2018-01-05 ENCOUNTER — TELEPHONE (OUTPATIENT)
Dept: INTERNAL MEDICINE | Facility: CLINIC | Age: 63
End: 2018-01-05

## 2018-01-08 ENCOUNTER — OFFICE VISIT (OUTPATIENT)
Dept: DIABETES | Facility: CLINIC | Age: 63
End: 2018-01-08
Payer: MEDICARE

## 2018-01-08 ENCOUNTER — LAB VISIT (OUTPATIENT)
Dept: LAB | Facility: HOSPITAL | Age: 63
End: 2018-01-08
Attending: PHYSICIAN ASSISTANT
Payer: MEDICARE

## 2018-01-08 ENCOUNTER — PATIENT OUTREACH (OUTPATIENT)
Dept: ADMINISTRATIVE | Facility: HOSPITAL | Age: 63
End: 2018-01-08

## 2018-01-08 VITALS
DIASTOLIC BLOOD PRESSURE: 82 MMHG | HEIGHT: 65 IN | SYSTOLIC BLOOD PRESSURE: 136 MMHG | WEIGHT: 206.81 LBS | BODY MASS INDEX: 34.46 KG/M2

## 2018-01-08 DIAGNOSIS — R51.9 HEADACHE, UNSPECIFIED HEADACHE TYPE: ICD-10-CM

## 2018-01-08 DIAGNOSIS — R70.0 ELEVATED ERYTHROCYTE SEDIMENTATION RATE: Chronic | ICD-10-CM

## 2018-01-08 LAB
ALBUMIN SERPL BCP-MCNC: 3.4 G/DL
ALP SERPL-CCNC: 82 U/L
ALT SERPL W/O P-5'-P-CCNC: 45 U/L
ANION GAP SERPL CALC-SCNC: 12 MMOL/L
AST SERPL-CCNC: 43 U/L
BASOPHILS # BLD AUTO: 0.01 K/UL
BASOPHILS NFR BLD: 0.1 %
BILIRUB SERPL-MCNC: 0.2 MG/DL
BUN SERPL-MCNC: 11 MG/DL
CALCIUM SERPL-MCNC: 9.5 MG/DL
CHLORIDE SERPL-SCNC: 101 MMOL/L
CO2 SERPL-SCNC: 28 MMOL/L
CREAT SERPL-MCNC: 1.1 MG/DL
CRP SERPL-MCNC: 8.4 MG/L
DIFFERENTIAL METHOD: NORMAL
EOSINOPHIL # BLD AUTO: 0.1 K/UL
EOSINOPHIL NFR BLD: 0.6 %
ERYTHROCYTE [DISTWIDTH] IN BLOOD BY AUTOMATED COUNT: 13.9 %
ERYTHROCYTE [SEDIMENTATION RATE] IN BLOOD BY WESTERGREN METHOD: 40 MM/HR
EST. GFR  (AFRICAN AMERICAN): >60 ML/MIN/1.73 M^2
EST. GFR  (NON AFRICAN AMERICAN): >60 ML/MIN/1.73 M^2
GLUCOSE SERPL-MCNC: 289 MG/DL (ref 70–110)
GLUCOSE SERPL-MCNC: 294 MG/DL
HCT VFR BLD AUTO: 43.8 %
HGB BLD-MCNC: 14.3 G/DL
LYMPHOCYTES # BLD AUTO: 3 K/UL
LYMPHOCYTES NFR BLD: 35.8 %
MCH RBC QN AUTO: 28.9 PG
MCHC RBC AUTO-ENTMCNC: 32.6 G/DL
MCV RBC AUTO: 89 FL
MONOCYTES # BLD AUTO: 0.8 K/UL
MONOCYTES NFR BLD: 9.4 %
NEUTROPHILS # BLD AUTO: 4.5 K/UL
NEUTROPHILS NFR BLD: 54.1 %
PLATELET # BLD AUTO: 180 K/UL
PMV BLD AUTO: 10.2 FL
POTASSIUM SERPL-SCNC: 3.8 MMOL/L
PROT SERPL-MCNC: 7.7 G/DL
RBC # BLD AUTO: 4.94 M/UL
SODIUM SERPL-SCNC: 141 MMOL/L
WBC # BLD AUTO: 8.39 K/UL

## 2018-01-08 PROCEDURE — 99214 OFFICE O/P EST MOD 30 MIN: CPT | Mod: S$GLB,,, | Performed by: PHYSICIAN ASSISTANT

## 2018-01-08 PROCEDURE — 36415 COLL VENOUS BLD VENIPUNCTURE: CPT | Mod: PO

## 2018-01-08 PROCEDURE — 82948 REAGENT STRIP/BLOOD GLUCOSE: CPT | Mod: S$GLB,,, | Performed by: PHYSICIAN ASSISTANT

## 2018-01-08 PROCEDURE — 86140 C-REACTIVE PROTEIN: CPT

## 2018-01-08 PROCEDURE — 80053 COMPREHEN METABOLIC PANEL: CPT | Mod: PO

## 2018-01-08 PROCEDURE — 85025 COMPLETE CBC W/AUTO DIFF WBC: CPT | Mod: PO

## 2018-01-08 PROCEDURE — 99999 PR PBB SHADOW E&M-EST. PATIENT-LVL II: CPT | Mod: PBBFAC,,, | Performed by: PHYSICIAN ASSISTANT

## 2018-01-08 PROCEDURE — 85651 RBC SED RATE NONAUTOMATED: CPT | Mod: PO

## 2018-01-08 NOTE — PROGRESS NOTES
Subjective:      Patient ID: Crow Green is a 62 y.o. male.    PCP: Mateo Lambert DO      Crow Green is a pleasant 62 y.o. male presenting to follow up on diabetes mellitus. He has had diabetes for 17 or more years. His last visit in Diabetes Management was 9/28/2017. Since that time he has had mild improvement in his glycemia. His blood sugar range fasting has been 200+ and 2 hour post meal has been 300+, and he has been monitoring 0-1 times per day. His current concerns are glycemic control.    He denies any hospital admissions, emergency room visits, hypoglycemia, syncope, diaphoresis, chest pain, or dyspnea.    He has lost 3 pounds since last visit. His BMI is 34.41 kg/m².    His blood sugar in the clinic today was:   Lab Results   Component Value Date    POCGLU 289 (A) 01/08/2018       We discussed the American diabetes Association recommendations:  hemoglobin A1c below 7.0%; all diabetics should be on statins unless contraindicated; one aspirin daily unless contraindicated; fasting blood sugar between 80 and 130 mg/dL; postprandial blood sugar below 180 mg/dl; prevention of hypoglycemia, may adjust goals to higher levels if persistent; ACE or ARB therapy if not contraindicated; and maintain in an ideal body weight with BMI below 25.    Crow is noncompliant some of the time with DM medications.     Crow is noncompliant some of the time with lifestyle modifications to include activity and meal planning.       Current Outpatient Prescriptions:     ALCOHOL ANTISEPTIC PADS (ALCOHOL PREP PADS TOP), , Disp: , Rfl:     amiodarone (PACERONE) 200 MG Tab, Take 1 tablet (200 mg total) by mouth once daily., Disp: 30 tablet, Rfl: 11    amlodipine (NORVASC) 10 MG tablet, Take 1 tablet (10 mg total) by mouth once daily., Disp: 90 tablet, Rfl: 3    ammonium lactate 12 % Crea, Apply 1 Act topically 2 (two) times daily. (Patient taking differently: Apply 1 Act topically once daily. ), Disp: 1 Tube,  Rfl: 3    apixaban 5 mg Tab, Take 1 tablet (5 mg total) by mouth 2 (two) times daily., Disp: 60 tablet, Rfl: 1    aspirin (ECOTRIN) 81 MG EC tablet, Take 1 tablet (81 mg total) by mouth once daily., Disp: , Rfl: 0    atorvastatin (LIPITOR) 40 MG tablet, Take 1 tablet (40 mg total) by mouth every evening., Disp: 90 tablet, Rfl: 3    blood sugar diagnostic Strp, 1 strip by Misc.(Non-Drug; Combo Route) route 6 (six) times daily., Disp: 160 strip, Rfl: 11    doxepin (SINEQUAN) 150 MG Cap, , Disp: , Rfl:     duloxetine (CYMBALTA) 30 MG capsule, , Disp: , Rfl:     fluticasone-salmeterol 250-50 mcg/dose (ADVAIR DISKUS) 250-50 mcg/dose diskus inhaler, Inhale 1 puff into the lungs 2 (two) times daily. Controller, Disp: 60 each, Rfl: 11    gabapentin (NEURONTIN) 300 MG capsule, Take 1 capsule (300 mg total) by mouth 3 (three) times daily., Disp: 270 capsule, Rfl: 3    hydrocodone-acetaminophen 5-325mg (NORCO) 5-325 mg per tablet, Take 1 tablet by mouth every 6 (six) hours as needed for Pain., Disp: 10 tablet, Rfl: 0    insulin glargine (LANTUS SOLOSTAR) 100 unit/mL (3 mL) InPn pen, Inject 50 Units into the skin every evening. (Patient taking differently: Inject 60 Units into the skin every evening. ), Disp: 2 Box, Rfl: 11    insulin syringe-needle U-100 1 mL 31 gauge x 5/16 Syrg, , Disp: , Rfl:     isosorbide mononitrate (IMDUR) 30 MG 24 hr tablet, Take 1 tablet (30 mg total) by mouth once daily., Disp: 30 tablet, Rfl: 0    latanoprost 0.005 % ophthalmic solution, Place 1 drop into both eyes every evening., Disp: 1 Bottle, Rfl: 4    lisinopril (PRINIVIL,ZESTRIL) 5 MG tablet, TAKE 1 TABLET (5 MG TOTAL) BY MOUTH ONCE DAILY., Disp: 90 tablet, Rfl: 2    metformin (GLUCOPHAGE) 1000 MG tablet, Take 1 tablet (1,000 mg total) by mouth 2 (two) times daily with meals., Disp: 180 tablet, Rfl: 3    metoprolol succinate (TOPROL-XL) 50 MG 24 hr tablet, TAKE 1 TABLET EVERY DAY, Disp: 30 tablet, Rfl: 5    nitroGLYCERIN  "(NITROSTAT) 0.3 MG SL tablet, Place 1 tablet (0.3 mg total) under the tongue every 5 (five) minutes as needed for Chest pain. No more than 3 tablets in one day., Disp: 30 tablet, Rfl: 1    NOVOLOG FLEXPEN 100 unit/mL InPn pen, ADMINISTER 20 UNITS UNDER THE SKIN THREE TIMES DAILY WITH MEALS, Disp: 15 mL, Rfl: 0    pantoprazole (PROTONIX) 40 MG tablet, Take 1 tablet (40 mg total) by mouth once daily., Disp: 30 tablet, Rfl: 11    pen needle, diabetic (BD INSULIN PEN NEEDLE UF SHORT) 31 gauge x 5/16" Ndle, Inject 1 each into the skin 3 (three) times daily., Disp: 90 each, Rfl: 11    sertraline (ZOLOFT) 25 MG tablet, , Disp: , Rfl:     tiotropium (SPIRIVA WITH HANDIHALER) 18 mcg inhalation capsule, Inhale 1 capsule (18 mcg total) into the lungs once daily. Controller, Disp: 90 capsule, Rfl: 3    trazodone (DESYREL) 150 MG tablet, , Disp: , Rfl:     TRUE METRIX AIR GLUCOSE METER kit, TEST FOUR TIMES DAILY BEFORE MEALS  AND EVERY NIGHT, Disp: 1 each, Rfl: 0    VENTOLIN HFA 90 mcg/actuation inhaler, , Disp: , Rfl:     dulaglutide (TRULICITY) 0.75 mg/0.5 mL PnIj, Inject 0.5 mLs (0.75 mg total) into the skin once a week., Disp: 4 Syringe, Rfl: 2    STANDARDS OF CARE:  Eye doctor: Dr. Dias, last exam 12/06/2017.  Dental exam: Recommend regular exams; denies gums bleeding.  Podiatry doctor:  ACE/ARB: Yes  Statin: Yes    ACTIVITY LEVEL: He exercises rarely.  BLOOD GLUCOSE TESTING: Self-monitoring with   SOCIAL HISTORY: . Lives with spouse. retired no tobacco use    Health Maintenance   Topic Date Due    TETANUS VACCINE  06/20/1975    Zoster Vaccine  06/20/2017    Hemoglobin A1c  07/03/2018    Lipid Panel  07/07/2018    Foot Exam  09/28/2018    Eye Exam  12/06/2018    Pneumococcal PPSV23 (Medium Risk) (2) 06/20/2022    Colonoscopy  10/22/2023    Hepatitis C Screening  Completed    Influenza Vaccine  Completed         Diabetes Management Status    Statin: Taking  ACE/ARB: Taking    Screening or " Prevention Patient's value Goal Complete/Controlled?   HgA1C Testing and Control   Lab Results   Component Value Date    HGBA1C 10.3 (H) 01/03/2018      Annually/Less than 8% No   Lipid profile : 07/07/2017 Annually Yes   LDL control Lab Results   Component Value Date    LDLCALC 112.4 07/07/2017    Annually/Less than 100 mg/dl  No   Nephropathy screening Lab Results   Component Value Date    LABMICR 800.0 07/20/2017     Lab Results   Component Value Date    PROTEINUA Negative 03/22/2017    Annually Yes   Blood pressure BP Readings from Last 1 Encounters:   01/08/18 136/82    Less than 140/90 Yes   Dilated retinal exam : 12/06/2017 Annually Yes   Foot exam   : 09/28/2017 Annually Yes       The following results were reviewed with patient.    Lab Results   Component Value Date    WBC 8.39 01/08/2018    HGB 14.3 01/08/2018    HCT 43.8 01/08/2018     01/08/2018    CHOL 171 07/07/2017    TRIG 83 07/07/2017    HDL 42 07/07/2017    ALT 45 (H) 01/08/2018    AST 43 (H) 01/08/2018     01/08/2018    K 3.8 01/08/2018     01/08/2018    CREATININE 1.1 01/08/2018    ESTGFRAFRICA >60 01/08/2018    EGFRNONAA >60 01/08/2018    BUN 11 01/08/2018    CO2 28 01/08/2018    TSH 2.374 07/20/2017    PSA 0.83 01/18/2013    INR 1.0 03/15/2017     (H) 01/08/2018       Lab Results   Component Value Date    HGBA1C 10.3 (H) 01/03/2018    HGBA1C 9.5 (H) 09/28/2017    HGBA1C 9.6 (H) 07/20/2017       Lab Results   Component Value Date    GLUTAMICACID 0.01 07/20/2017    CPEPTIDE 4.37 07/20/2017     Lab Results   Component Value Date    TSH 2.374 07/20/2017     Lab Results   Component Value Date    TOTALTESTOST 182 (L) 01/18/2013     Lab Results   Component Value Date    TESTOSTERONE 213 (L) 12/04/2014    TESTOSTERONE 35.9 (L) 12/04/2014     Lab Results   Component Value Date    FERRITIN 505 (H) 01/12/2017     Lab Results   Component Value Date    PTH 95.0 (H) 12/04/2014    CALCIUM 9.5 01/08/2018    PHOS 2.6 (L) 11/16/2016        Review of patient's allergies indicates:  No Known Allergies    Past Medical History:   Diagnosis Date    Arthritis     Asthma     Depression     Diabetes mellitus     Diabetes mellitus, type 2 Diagnosed in 2000    Elevated PSA     Hypertension        Review of Systems   Constitutional: Negative.  Negative for activity change, appetite change, chills, diaphoresis, fatigue, fever and unexpected weight change.   HENT: Negative.  Negative for dental problem, facial swelling, hearing loss, nosebleeds, trouble swallowing and voice change.    Eyes: Negative.  Negative for photophobia, pain, discharge, redness, itching and visual disturbance.   Respiratory: Negative.  Negative for cough, choking, chest tightness, shortness of breath and wheezing.    Cardiovascular: Negative.  Negative for chest pain, palpitations and leg swelling.   Gastrointestinal: Negative.  Negative for abdominal distention, abdominal pain, blood in stool, constipation, diarrhea, nausea and vomiting.   Endocrine: Negative.  Negative for cold intolerance, heat intolerance, polydipsia, polyphagia and polyuria.   Genitourinary: Negative.  Negative for decreased urine volume, difficulty urinating, dysuria, frequency, scrotal swelling, testicular pain and urgency.   Musculoskeletal: Negative.  Negative for arthralgias, back pain, gait problem, joint swelling, myalgias, neck pain and neck stiffness.   Skin: Negative.  Negative for color change, pallor, rash and wound.   Allergic/Immunologic: Negative.  Negative for environmental allergies, food allergies and immunocompromised state.   Neurological: Negative.  Negative for dizziness, tremors, seizures, syncope, facial asymmetry, speech difficulty, weakness, light-headedness, numbness and headaches.   Hematological: Negative.  Negative for adenopathy. Does not bruise/bleed easily.   Psychiatric/Behavioral: Negative.  Negative for agitation, behavioral problems, confusion, decreased concentration,  "dysphoric mood, hallucinations, self-injury, sleep disturbance and suicidal ideas. The patient is not nervous/anxious and is not hyperactive.       Objective:     Vitals - 1 value per visit 12/6/2017 1/3/2018 1/8/2018   SYSTOLIC 139 120 136   DIASTOLIC 81 72 82   PULSE 73 94 -   TEMPERATURE - 96 -   RESPIRATIONS - 18 -   SPO2 - 96 -   Weight (lb) 210.98 209.44 206.79   Weight (kg) 95.7 95 93.8   HEIGHT - 5' 5" 5' 5"   BODY MASS INDEX 35.11 34.85 34.41   VISIT REPORT - - -   Pain Score  5 4 10   Some recent data might be hidden       Physical Exam   Constitutional: He is oriented to person, place, and time. He appears well-developed and well-nourished. He is cooperative.  Non-toxic appearance. He does not have a sickly appearance. He does not appear ill. No distress. He is not intubated.   HENT:   Head: Normocephalic and atraumatic. Not macrocephalic and not microcephalic. Head is without raccoon's eyes, without Summers's sign, without abrasion, without contusion, without laceration, without right periorbital erythema and without left periorbital erythema. Hair is normal.   Right Ear: External ear normal. No lacerations. No drainage, swelling or tenderness. No foreign bodies. No mastoid tenderness. Tympanic membrane is not injected, not scarred, not perforated, not erythematous, not retracted and not bulging. Tympanic membrane mobility is normal. No middle ear effusion. No hemotympanum. No decreased hearing is noted.   Left Ear: External ear normal. No lacerations. No drainage, swelling or tenderness. No foreign bodies. No mastoid tenderness. Tympanic membrane is not injected, not scarred, not perforated, not erythematous, not retracted and not bulging. Tympanic membrane mobility is normal.  No middle ear effusion. No hemotympanum. No decreased hearing is noted.   Nose: Nose normal.   Mouth/Throat: Oropharynx is clear and moist.   Eyes: EOM and lids are normal. Pupils are equal, round, and reactive to light. Right eye " exhibits no chemosis, no discharge, no exudate and no hordeolum. No foreign body present in the right eye. Left eye exhibits no chemosis, no discharge, no exudate and no hordeolum. No foreign body present in the left eye. Right conjunctiva is not injected. Right conjunctiva has no hemorrhage. Left conjunctiva is not injected. Left conjunctiva has no hemorrhage. No scleral icterus. Right eye exhibits normal extraocular motion and no nystagmus. Left eye exhibits normal extraocular motion and no nystagmus. Right pupil is round and reactive. Left pupil is round and reactive. Pupils are equal.   Neck: Normal range of motion, full passive range of motion without pain and phonation normal. Neck supple. Normal carotid pulses, no hepatojugular reflux and no JVD present. No tracheal tenderness, no spinous process tenderness and no muscular tenderness present. Carotid bruit is not present. No neck rigidity. No tracheal deviation, no edema, no erythema and normal range of motion present. No thyroid mass and no thyromegaly present.   Cardiovascular: Normal rate, regular rhythm, normal heart sounds and intact distal pulses.   No extrasystoles are present. PMI is not displaced.  Exam reveals no gallop, no friction rub and no decreased pulses.    No murmur heard.  Pulses:       Carotid pulses are 2+ on the right side, and 2+ on the left side.       Radial pulses are 2+ on the right side, and 2+ on the left side.        Dorsalis pedis pulses are 2+ on the right side, and 2+ on the left side.        Posterior tibial pulses are 2+ on the right side, and 2+ on the left side.   Pulmonary/Chest: Effort normal and breath sounds normal. No accessory muscle usage or stridor. No apnea, no tachypnea and no bradypnea. He is not intubated. No respiratory distress. He has no decreased breath sounds. He has no wheezes. He has no rhonchi. He has no rales. He exhibits no tenderness.   Abdominal: Soft. Bowel sounds are normal. He exhibits no  shifting dullness, no distension, no pulsatile liver, no fluid wave, no abdominal bruit, no ascites, no pulsatile midline mass and no mass. There is no hepatosplenomegaly, splenomegaly or hepatomegaly. There is no tenderness. There is no rigidity, no rebound, no guarding, no CVA tenderness and no tenderness at McBurney's point. No hernia. Hernia confirmed negative in the ventral area.   Musculoskeletal: Normal range of motion. He exhibits no edema or tenderness.        Right foot: There is normal range of motion and no deformity.        Left foot: There is normal range of motion and no deformity.   Feet:   Right Foot:   Protective Sensation: 6 sites tested. 6 sites sensed.   Skin Integrity: Negative for ulcer, blister, skin breakdown, erythema, warmth, callus or dry skin.   Left Foot:   Protective Sensation: 6 sites tested. 6 sites sensed.   Skin Integrity: Negative for ulcer, blister, skin breakdown, erythema, warmth, callus or dry skin.   Lymphadenopathy:        Head (right side): No submental, no submandibular, no tonsillar, no preauricular, no posterior auricular and no occipital adenopathy present.        Head (left side): No submental, no submandibular, no tonsillar, no preauricular, no posterior auricular and no occipital adenopathy present.     He has no cervical adenopathy.        Right cervical: No superficial cervical, no deep cervical and no posterior cervical adenopathy present.       Left cervical: No superficial cervical, no deep cervical and no posterior cervical adenopathy present.   Neurological: He is alert and oriented to person, place, and time. He has normal reflexes. He displays no atrophy and no tremor. No cranial nerve deficit or sensory deficit. He exhibits normal muscle tone. He displays no seizure activity. Coordination and gait normal.   Reflex Scores:       Bicep reflexes are 2+ on the right side and 2+ on the left side.       Brachioradialis reflexes are 2+ on the right side and 2+ on  the left side.       Patellar reflexes are 2+ on the right side and 2+ on the left side.  Skin: Skin is warm and dry. No rash noted. No erythema. No pallor.   Psychiatric: He has a normal mood and affect. His mood appears not anxious. His affect is not angry, not blunt, not labile and not inappropriate. His speech is not rapid and/or pressured, not delayed, not tangential and not slurred. He is not agitated, not aggressive, not hyperactive, not slowed, not withdrawn, not actively hallucinating and not combative. Thought content is not paranoid and not delusional. Cognition and memory are not impaired. He does not express impulsivity or inappropriate judgment. He does not exhibit a depressed mood. He expresses no homicidal and no suicidal ideation. He expresses no suicidal plans and no homicidal plans. He is communicative. He exhibits normal recent memory and normal remote memory. He is attentive.     Assessment:     1. 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 He has elected the following treatments.     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  Dispense: 4 Syringe; Refill: 2  -     POCT glucose    1.) Patient was instructed to monitor blood glucose twice daily, fasting, and 2 hour post meal; if on Multiple Daily Injections (MDI) he will need to have  pre-meal blood glucose as well. Reminded to bring BG meter or record to each visit for review.  2.) Reviewed pathophysiology of diabetes, complications related to the disease, importance of annual dilated eye exam and self daily foot examination.  3.) Continue medications as prescribed MDI with Lantus and Novolog; Metformin; Trulicity. [facility] MyChart or Phone review in 1 week with [facility] records for adjustment of medication.  4.) Advised patient to continue to follow up with Dietician/CDE as directed.  5.) Discussed activity, benefits, methods, and precautions. Recommended patient start/continue some form of exercise and increase as tolerated to 60 minutes per day to facilitate weight loss and aid in control of BGs. Also reminded patient of WHO recommendation of 10,000 steps daily as a goal.   6.) A1C, TSH, Lipid Panel, CMP/renal panel with eGFR and Micro/Creatinine prior to next visit, if not already done.  7.) Return to clinic in 6 weeks for follow up. Advised patient to call clinic with any questions or concerns.    A total of 30 minutes was spent in face to face time, of which 50 % was spent in counseling patient on disease process, complications, treatment, and side effects of medications.    The patient was explained the above plan and given opportunity to ask questions.  He understands, chooses and consents to this plan and accepts all the risks, which include but are not limited to the risks mentioned above.   He understands the alternative of having no testing, interventions or treatments at this time. He left content and without further questions.

## 2018-01-09 DIAGNOSIS — E11.65 UNCONTROLLED TYPE 2 DIABETES MELLITUS WITH HYPERGLYCEMIA, WITH LONG-TERM CURRENT USE OF INSULIN: Primary | ICD-10-CM

## 2018-01-09 DIAGNOSIS — Z79.4 UNCONTROLLED TYPE 2 DIABETES MELLITUS WITH HYPERGLYCEMIA, WITH LONG-TERM CURRENT USE OF INSULIN: Primary | ICD-10-CM

## 2018-01-10 ENCOUNTER — OFFICE VISIT (OUTPATIENT)
Dept: PODIATRY | Facility: CLINIC | Age: 63
End: 2018-01-10
Payer: MEDICARE

## 2018-01-10 ENCOUNTER — DOCUMENTATION ONLY (OUTPATIENT)
Dept: ENDOSCOPY | Facility: HOSPITAL | Age: 63
End: 2018-01-10

## 2018-01-10 VITALS
HEIGHT: 65 IN | SYSTOLIC BLOOD PRESSURE: 126 MMHG | DIASTOLIC BLOOD PRESSURE: 75 MMHG | WEIGHT: 210.75 LBS | HEART RATE: 87 BPM | BODY MASS INDEX: 35.11 KG/M2

## 2018-01-10 DIAGNOSIS — E11.49 TYPE II DIABETES MELLITUS WITH NEUROLOGICAL MANIFESTATIONS: ICD-10-CM

## 2018-01-10 DIAGNOSIS — E11.9 COMPREHENSIVE DIABETIC FOOT EXAMINATION, TYPE 2 DM, ENCOUNTER FOR: Primary | ICD-10-CM

## 2018-01-10 DIAGNOSIS — B35.1 DERMATOPHYTOSIS OF NAIL: ICD-10-CM

## 2018-01-10 DIAGNOSIS — L85.3 XEROSIS OF SKIN: ICD-10-CM

## 2018-01-10 PROCEDURE — 11721 DEBRIDE NAIL 6 OR MORE: CPT | Mod: Q9,S$GLB,, | Performed by: PODIATRIST

## 2018-01-10 PROCEDURE — 99999 PR PBB SHADOW E&M-EST. PATIENT-LVL III: CPT | Mod: PBBFAC,,, | Performed by: PODIATRIST

## 2018-01-10 PROCEDURE — 99499 UNLISTED E&M SERVICE: CPT | Mod: S$GLB,,, | Performed by: PODIATRIST

## 2018-01-10 NOTE — PROGRESS NOTES
Subjective:     Patient ID: Crow Green is a 62 y.o. male.    Chief Complaint: Nail Care (Patient states no current pain or problems.) and Diabetes Mellitus (Last PCP visit with - 1/3/18.)    Crow is a 62 y.o. male who presents to the clinic for evaluation and treatment of high risk feet. Crow has a past medical history of Arthritis; Asthma; Depression; Diabetes mellitus; Diabetes mellitus, type 2 (Diagnosed in 2000); Elevated PSA; and Hypertension. The patient's chief complaint is long, thick toenails.  This patient has documented high risk feet requiring routine maintenance secondary to diabetes mellitis and those secondary complications of diabetes, as mentioned..    PCP: Mateo Lambert, DO    Date Last Seen by PCP: 7/25/17    Current shoe gear:  Affected Foot: Tennis shoes     Unaffected Foot: Tennis shoes    Hemoglobin A1C   Date Value Ref Range Status   01/03/2018 10.3 (H) 4.0 - 5.6 % Final     Comment:     According to ADA guidelines, hemoglobin A1c <7.0% represents  optimal control in non-pregnant diabetic patients. Different  metrics may apply to specific patient populations.   Standards of Medical Care in Diabetes-2016.  For the purpose of screening for the presence of diabetes:  <5.7%     Consistent with the absence of diabetes  5.7-6.4%  Consistent with increasing risk for diabetes   (prediabetes)  >or=6.5%  Consistent with diabetes  Currently, no consensus exists for use of hemoglobin A1c  for diagnosis of diabetes for children.  This Hemoglobin A1c assay has significant interference with fetal   hemoglobin   (HbF). The results are invalid for patients with abnormal amounts of   HbF,   including those with known Hereditary Persistence   of Fetal Hemoglobin. Heterozygous hemoglobin variants (HbAS, HbAC,   HbAD, HbAE, HbA2) do not significantly interfere with this assay;   however, presence of multiple variants in a sample may impact the %   interference.     09/28/2017 9.5 (H)  4.0 - 5.6 % Final     Comment:     According to ADA guidelines, hemoglobin A1c <7.0% represents  optimal control in non-pregnant diabetic patients. Different  metrics may apply to specific patient populations.   Standards of Medical Care in Diabetes-2016.  For the purpose of screening for the presence of diabetes:  <5.7%     Consistent with the absence of diabetes  5.7-6.4%  Consistent with increasing risk for diabetes   (prediabetes)  >or=6.5%  Consistent with diabetes  Currently, no consensus exists for use of hemoglobin A1c  for diagnosis of diabetes for children.  This Hemoglobin A1c assay has significant interference with fetal   hemoglobin   (HbF). The results are invalid for patients with abnormal amounts of   HbF,   including those with known Hereditary Persistence   of Fetal Hemoglobin. Heterozygous hemoglobin variants (HbAS, HbAC,   HbAD, HbAE, HbA2) do not significantly interfere with this assay;   however, presence of multiple variants in a sample may impact the %   interference.     07/20/2017 9.6 (H) 4.0 - 5.6 % Final     Comment:     According to ADA guidelines, hemoglobin A1c <7.0% represents  optimal control in non-pregnant diabetic patients. Different  metrics may apply to specific patient populations.   Standards of Medical Care in Diabetes-2016.  For the purpose of screening for the presence of diabetes:  <5.7%     Consistent with the absence of diabetes  5.7-6.4%  Consistent with increasing risk for diabetes   (prediabetes)  >or=6.5%  Consistent with diabetes  Currently, no consensus exists for use of hemoglobin A1c  for diagnosis of diabetes for children.  This Hemoglobin A1c assay has significant interference with fetal   hemoglobin   (HbF). The results are invalid for patients with abnormal amounts of   HbF,   including those with known Hereditary Persistence   of Fetal Hemoglobin. Heterozygous hemoglobin variants (HbAS, HbAC,   HbAD, HbAE, HbA2) do not significantly interfere with this  assay;   however, presence of multiple variants in a sample may impact the %   interference.             Patient Active Problem List   Diagnosis    ED (erectile dysfunction)    Testicular hypogonadism    Non-proliferative diabetic retinopathy, mild, both eyes    Hypertension    Diabetic polyneuropathy    Coronary artery disease involving native coronary artery without angina pectoris    Chronic combined systolic and diastolic congestive heart failure    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, with long-term current use of insulin    SANDRITA on CPAP    Asthma    Delayed sleep phase syndrome    Psychophysiological insomnia    Nightmares    Sleep related gastroesophageal reflux disease    Inadequate sleep hygiene    PAF (paroxysmal atrial fibrillation)    At risk for medication noncompliance    Obesity (BMI 30.0-34.9)    Indeterminate stage chronic open angle glaucoma    Right hip pain    Elevated erythrocyte sedimentation rate    Obesity, Class II, BMI 35-39.9, with comorbidity    Gait instability    Chronic right shoulder pain       Medication List with Changes/Refills   Current Medications    ALCOHOL ANTISEPTIC PADS (ALCOHOL PREP PADS TOP)        AMIODARONE (PACERONE) 200 MG TAB    Take 1 tablet (200 mg total) by mouth once daily.    AMLODIPINE (NORVASC) 10 MG TABLET    Take 1 tablet (10 mg total) by mouth once daily.    AMMONIUM LACTATE 12 % CREA    Apply 1 Act topically 2 (two) times daily.    APIXABAN 5 MG TAB    Take 1 tablet (5 mg total) by mouth 2 (two) times daily.    ASPIRIN (ECOTRIN) 81 MG EC TABLET    Take 1 tablet (81 mg total) by mouth once daily.    ATORVASTATIN (LIPITOR) 40 MG TABLET    Take 1 tablet (40 mg total) by mouth every evening.    BLOOD SUGAR DIAGNOSTIC STRP    1 strip by Misc.(Non-Drug; Combo Route) route 6 (six) times daily.    DOXEPIN (SINEQUAN) 150 MG CAP        DULOXETINE (CYMBALTA) 30 MG CAPSULE         "EXENATIDE MICROSPHERES (BYDUREON BCISE) 2 MG/0.85 ML ATIN    Inject 2 mg into the skin every 7 days.    FLUTICASONE-SALMETEROL 250-50 MCG/DOSE (ADVAIR DISKUS) 250-50 MCG/DOSE DISKUS INHALER    Inhale 1 puff into the lungs 2 (two) times daily. Controller    GABAPENTIN (NEURONTIN) 300 MG CAPSULE    Take 1 capsule (300 mg total) by mouth 3 (three) times daily.    HYDROCODONE-ACETAMINOPHEN 5-325MG (NORCO) 5-325 MG PER TABLET    Take 1 tablet by mouth every 6 (six) hours as needed for Pain.    INSULIN GLARGINE (LANTUS SOLOSTAR) 100 UNIT/ML (3 ML) INPN PEN    Inject 50 Units into the skin every evening.    INSULIN SYRINGE-NEEDLE U-100 1 ML 31 GAUGE X 5/16 SYRG        ISOSORBIDE MONONITRATE (IMDUR) 30 MG 24 HR TABLET    Take 1 tablet (30 mg total) by mouth once daily.    LATANOPROST 0.005 % OPHTHALMIC SOLUTION    Place 1 drop into both eyes every evening.    LISINOPRIL (PRINIVIL,ZESTRIL) 5 MG TABLET    TAKE 1 TABLET (5 MG TOTAL) BY MOUTH ONCE DAILY.    METFORMIN (GLUCOPHAGE) 1000 MG TABLET    Take 1 tablet (1,000 mg total) by mouth 2 (two) times daily with meals.    METOPROLOL SUCCINATE (TOPROL-XL) 50 MG 24 HR TABLET    TAKE 1 TABLET EVERY DAY    NITROGLYCERIN (NITROSTAT) 0.3 MG SL TABLET    Place 1 tablet (0.3 mg total) under the tongue every 5 (five) minutes as needed for Chest pain. No more than 3 tablets in one day.    NOVOLOG FLEXPEN 100 UNIT/ML INPN PEN    ADMINISTER 20 UNITS UNDER THE SKIN THREE TIMES DAILY WITH MEALS    PANTOPRAZOLE (PROTONIX) 40 MG TABLET    Take 1 tablet (40 mg total) by mouth once daily.    PEN NEEDLE, DIABETIC (BD INSULIN PEN NEEDLE UF SHORT) 31 GAUGE X 5/16" NDLE    Inject 1 each into the skin 3 (three) times daily.    SERTRALINE (ZOLOFT) 25 MG TABLET        TIOTROPIUM (SPIRIVA WITH HANDIHALER) 18 MCG INHALATION CAPSULE    Inhale 1 capsule (18 mcg total) into the lungs once daily. Controller    TRAZODONE (DESYREL) 150 MG TABLET        TRUE METRIX AIR GLUCOSE METER KIT    TEST FOUR TIMES DAILY " "BEFORE MEALS  AND EVERY NIGHT    VENTOLIN HFA 90 MCG/ACTUATION INHALER           Review of patient's allergies indicates:  No Known Allergies    Past Surgical History:   Procedure Laterality Date    CHOLECYSTECTOMY         Family History   Problem Relation Age of Onset    Glaucoma Mother     Cataracts Mother     Cataracts Father     Glaucoma Sister     Cataracts Sister     Glaucoma Maternal Aunt     Prostate cancer Neg Hx        Social History     Social History    Marital status: Legally      Spouse name: N/A    Number of children: 5    Years of education: N/A     Occupational History    Not on file.     Social History Main Topics    Smoking status: Never Smoker    Smokeless tobacco: Never Used    Alcohol use No    Drug use: No    Sexual activity: Not Currently     Other Topics Concern    Not on file     Social History Narrative    No narrative on file       Vitals:    01/10/18 1006   BP: 126/75   Pulse: 87   Weight: 95.6 kg (210 lb 12.2 oz)   Height: 5' 5" (1.651 m)   PainSc: 0-No pain       Hemoglobin A1C   Date Value Ref Range Status   01/03/2018 10.3 (H) 4.0 - 5.6 % Final     Comment:     According to ADA guidelines, hemoglobin A1c <7.0% represents  optimal control in non-pregnant diabetic patients. Different  metrics may apply to specific patient populations.   Standards of Medical Care in Diabetes-2016.  For the purpose of screening for the presence of diabetes:  <5.7%     Consistent with the absence of diabetes  5.7-6.4%  Consistent with increasing risk for diabetes   (prediabetes)  >or=6.5%  Consistent with diabetes  Currently, no consensus exists for use of hemoglobin A1c  for diagnosis of diabetes for children.  This Hemoglobin A1c assay has significant interference with fetal   hemoglobin   (HbF). The results are invalid for patients with abnormal amounts of   HbF,   including those with known Hereditary Persistence   of Fetal Hemoglobin. Heterozygous hemoglobin variants " (HbAS, HbAC,   HbAD, HbAE, HbA2) do not significantly interfere with this assay;   however, presence of multiple variants in a sample may impact the %   interference.     09/28/2017 9.5 (H) 4.0 - 5.6 % Final     Comment:     According to ADA guidelines, hemoglobin A1c <7.0% represents  optimal control in non-pregnant diabetic patients. Different  metrics may apply to specific patient populations.   Standards of Medical Care in Diabetes-2016.  For the purpose of screening for the presence of diabetes:  <5.7%     Consistent with the absence of diabetes  5.7-6.4%  Consistent with increasing risk for diabetes   (prediabetes)  >or=6.5%  Consistent with diabetes  Currently, no consensus exists for use of hemoglobin A1c  for diagnosis of diabetes for children.  This Hemoglobin A1c assay has significant interference with fetal   hemoglobin   (HbF). The results are invalid for patients with abnormal amounts of   HbF,   including those with known Hereditary Persistence   of Fetal Hemoglobin. Heterozygous hemoglobin variants (HbAS, HbAC,   HbAD, HbAE, HbA2) do not significantly interfere with this assay;   however, presence of multiple variants in a sample may impact the %   interference.     07/20/2017 9.6 (H) 4.0 - 5.6 % Final     Comment:     According to ADA guidelines, hemoglobin A1c <7.0% represents  optimal control in non-pregnant diabetic patients. Different  metrics may apply to specific patient populations.   Standards of Medical Care in Diabetes-2016.  For the purpose of screening for the presence of diabetes:  <5.7%     Consistent with the absence of diabetes  5.7-6.4%  Consistent with increasing risk for diabetes   (prediabetes)  >or=6.5%  Consistent with diabetes  Currently, no consensus exists for use of hemoglobin A1c  for diagnosis of diabetes for children.  This Hemoglobin A1c assay has significant interference with fetal   hemoglobin   (HbF). The results are invalid for patients with abnormal amounts of    HbF,   including those with known Hereditary Persistence   of Fetal Hemoglobin. Heterozygous hemoglobin variants (HbAS, HbAC,   HbAD, HbAE, HbA2) do not significantly interfere with this assay;   however, presence of multiple variants in a sample may impact the %   interference.         Review of Systems   Constitutional: Negative for chills and fever.   Respiratory: Negative for shortness of breath.    Cardiovascular: Negative for chest pain, palpitations, orthopnea, claudication and leg swelling.   Gastrointestinal: Negative for diarrhea, nausea and vomiting.   Musculoskeletal: Negative for joint pain.   Skin: Negative for rash.   Neurological: Positive for tingling and sensory change. Negative for dizziness, focal weakness and weakness.   Psychiatric/Behavioral: Negative.          Objective:      PHYSICAL EXAM: Apperance: Alert and orient in no distress,well developed, and with good attention to grooming and body habits  Patient presents ambulating in tennis shoes.   LOWER EXTREMITY EXAM:  VASCULAR: Dorsalis pedis pulses 2/4 bilateral and Posterior Tibial pulses 2/4 bilateral. Capillary fill time <3 seconds bilateral. No edema observed bilateral. Varicosities absent bilateral. Skin temperature of the lower extremities is warm to warm, proximal to distal. Hair growth dim bilateral.  DERMATOLOGICAL: No skin rashes, subcutaneous nodules, lesions, or ulcers observed bilateral. Nails 1,2,3,4,5 bilateral elongated, thickened, and discolored with subungual debris. Webspaces 1-4 clean, dry and without evidence of break in skin integrity bilateral. Dry and scaly skin noted plantarly bilateral.   NEUROLOGICAL: Light touch, sharp-dull, proprioception all present and equal bilaterally.  Vibratory sensation diminished at bilateral hallux and navicular. Protective sensation absent at sites as tested with a [place]imm[employer] 5.07 monofilament.   MUSCULOSKELETAL: Muscle strength is 5/5 for foot inverters, everters,  plantarflexors, and dorsiflexors. Muscle tone is normal.         Assessment:       Encounter Diagnoses   Name Primary?    Comprehensive diabetic foot examination, type 2 DM, encounter for Yes    Type II diabetes mellitus with neurological manifestations     Dermatophytosis of nail     Xerosis of skin          Plan:   Comprehensive diabetic foot examination, type 2 DM, encounter for    Type II diabetes mellitus with neurological manifestations    Dermatophytosis of nail    Xerosis of skin      I counseled the patient on his conditions, regarding findings of my examination, my impressions, and usual treatment plan.   Shoe inspection. Diabetic Foot Education. Patient reminded of the importance of good nutrition and blood sugar control to help prevent podiatric complications of diabetes. Patient instructed on proper foot hygeine. We discussed wearing proper shoe gear, daily foot inspections, never walking without protective shoe gear, never putting sharp instruments to feet.    With patient's permission, nails 1-5 bilateral were debrided/trimmed in length and thickness aggressively to their soft tissue attachment mechanically and with electric , removing all offending nail and debris. Patient relates relief following the procedure.  Patient  will continue to monitor the areas daily, inspect feet, wear protective shoe gear when ambulatory, moisturizer to maintain skin integrity. Patient reminded of the importance of good nutrition and blood sugar control to help prevent podiatric complications of diabetes.  Patient to return 3 months or sooner if needed.              Barb Veras DPM  [facility]

## 2018-01-11 ENCOUNTER — OFFICE VISIT (OUTPATIENT)
Dept: INTERNAL MEDICINE | Facility: CLINIC | Age: 63
End: 2018-01-11
Payer: MEDICARE

## 2018-01-11 VITALS
SYSTOLIC BLOOD PRESSURE: 118 MMHG | OXYGEN SATURATION: 80 % | RESPIRATION RATE: 18 BRPM | HEIGHT: 65 IN | WEIGHT: 211.19 LBS | TEMPERATURE: 96 F | BODY MASS INDEX: 35.18 KG/M2 | HEART RATE: 94 BPM | DIASTOLIC BLOOD PRESSURE: 68 MMHG

## 2018-01-11 DIAGNOSIS — M19.90 ARTHRITIS: Primary | ICD-10-CM

## 2018-01-11 PROCEDURE — 99213 OFFICE O/P EST LOW 20 MIN: CPT | Mod: S$GLB,,, | Performed by: FAMILY MEDICINE

## 2018-01-11 PROCEDURE — 99999 PR PBB SHADOW E&M-EST. PATIENT-LVL III: CPT | Mod: PBBFAC,,, | Performed by: FAMILY MEDICINE

## 2018-01-11 RX ORDER — NAPROXEN 500 MG/1
500 TABLET ORAL 2 TIMES DAILY
Qty: 14 TABLET | Refills: 0 | Status: SHIPPED | OUTPATIENT
Start: 2018-01-11 | End: 2018-02-12 | Stop reason: SDUPTHER

## 2018-01-11 NOTE — PROGRESS NOTES
Subjective:       Patient ID: Crow Green is a 62 y.o. male.    Chief Complaint: shoulder pain, headaches, elevated sed rate    Crow was sent to us recently for evaluation for GCA. He c/o significant headaches and tenderness to his left temporal artery, as well as shoulder and hip complaints, plus elevated esr.  He was started on prednisone 60mg/day and sent for biopsy.  Biopsy was negative and Dr. Jimenez started tapering his prednisone.   His headaches have been present for months.  He has chronic stiffness, joint complaints that didn't fit typical PMR symptoms.  He has chronic shoulder pain, right rtc issues, with limited motion. He has Right hip pain and is in PT for that (has been on hold but going back monday).  He has diabetes with associated diabetic retinopathy and polyneuropathy. Spep showed no monoclonal peaks, did have some increased beta globulins.     He complains of kinsey shoulder pain, right knee, neck and back pain today. Started a few days ago, His back pain is so bad he is using a walker today.  He saw his pcp yesterday who sent in naproxen. He hasn't started taking. Headaches have improved.  We did a steroid injection in the right shoulder last time which helped a little briefly.  We have been tapering his prednisone down and he is now on 5mg/day.  His esr/crp have increased slightly as we have tapered his steroid. Today his pain is 4/10.  No headaches, vision issues, jaw pain.        Review of Systems   Constitutional: Negative for chills, fatigue, fever and unexpected weight change.   HENT: Negative for congestion, mouth sores and rhinorrhea.    Eyes: Positive for visual disturbance. Negative for pain, discharge and redness.   Respiratory: Negative for cough and shortness of breath.    Cardiovascular: Negative for chest pain and leg swelling.   Gastrointestinal: Negative for abdominal pain, diarrhea, nausea and vomiting.   Endocrine: Negative for cold intolerance and heat intolerance.  "  Genitourinary: Negative for dysuria.   Musculoskeletal: Positive for arthralgias and myalgias. Negative for joint swelling.   Skin: Negative for rash.   Allergic/Immunologic: Negative for immunocompromised state.   Neurological: Positive for numbness and headaches. Negative for weakness.   Psychiatric/Behavioral: The patient is not nervous/anxious.          Objective:   /81   Pulse 88   Ht 5' 5" (1.651 m)   Wt 97.4 kg (214 lb 11.7 oz)   BMI 35.73 kg/m²      Physical Exam   Constitutional: He is oriented to person, place, and time and well-developed, well-nourished, and in no distress.   HENT:   Head: Normocephalic and atraumatic.   Left temple with scar from biopsy mild tenderness   Eyes: Pupils are equal, round, and reactive to light. Right eye exhibits no discharge.   Neck: Normal range of motion.   Cardiovascular: Normal rate, regular rhythm and normal heart sounds.  Exam reveals no friction rub.    Pulmonary/Chest: Effort normal and breath sounds normal. No respiratory distress.   Abdominal: Soft. He exhibits no distension. There is no tenderness.   Lymphadenopathy:     He has no cervical adenopathy.   Neurological: He is alert and oriented to person, place, and time.   Skin: No rash noted. No erythema.     Psychiatric: Mood normal.   Musculoskeletal: Normal range of motion. He exhibits tenderness. He exhibits no edema or deformity.   No synovitis to any joints    Right knee +crepitus, no effusion, warmth or tenderness  +tenderness to paraspinals cervical and upper back and kinsey shoulders, painful rom kinsey shoulders           Recent Results (from the past 168 hour(s))   POCT glucose    Collection Time: 01/08/18  9:02 AM   Result Value Ref Range    POC Glucose 289 (A) 70 - 110 mg/dL   C-reactive protein    Collection Time: 01/08/18  9:55 AM   Result Value Ref Range    CRP 8.4 (H) 0.0 - 8.2 mg/L   CBC auto differential    Collection Time: 01/08/18  9:58 AM   Result Value Ref Range    WBC 8.39 3.90 - " 12.70 K/uL    RBC 4.94 4.60 - 6.20 M/uL    Hemoglobin 14.3 14.0 - 18.0 g/dL    Hematocrit 43.8 40.0 - 54.0 %    MCV 89 82 - 98 fL    MCH 28.9 27.0 - 31.0 pg    MCHC 32.6 32.0 - 36.0 g/dL    RDW 13.9 11.5 - 14.5 %    Platelets 180 150 - 350 K/uL    MPV 10.2 9.2 - 12.9 fL    Gran # 4.5 1.8 - 7.7 K/uL    Lymph # 3.0 1.0 - 4.8 K/uL    Mono # 0.8 0.3 - 1.0 K/uL    Eos # 0.1 0.0 - 0.5 K/uL    Baso # 0.01 0.00 - 0.20 K/uL    Gran% 54.1 38.0 - 73.0 %    Lymph% 35.8 18.0 - 48.0 %    Mono% 9.4 4.0 - 15.0 %    Eosinophil% 0.6 0.0 - 8.0 %    Basophil% 0.1 0.0 - 1.9 %    Differential Method Automated    Comprehensive metabolic panel    Collection Time: 01/08/18  9:58 AM   Result Value Ref Range    Sodium 141 136 - 145 mmol/L    Potassium 3.8 3.5 - 5.1 mmol/L    Chloride 101 95 - 110 mmol/L    CO2 28 23 - 29 mmol/L    Glucose 294 (H) 70 - 110 mg/dL    BUN, Bld 11 6 - 20 mg/dL    Creatinine 1.1 0.5 - 1.4 mg/dL    Calcium 9.5 8.7 - 10.5 mg/dL    Total Protein 7.7 6.0 - 8.4 g/dL    Albumin 3.4 (L) 3.5 - 5.2 g/dL    Total Bilirubin 0.2 0.1 - 1.0 mg/dL    Alkaline Phosphatase 82 55 - 135 U/L    AST 43 (H) 10 - 40 U/L    ALT 45 (H) 10 - 44 U/L    Anion Gap 12 8 - 16 mmol/L    eGFR if African American >60 >60 mL/min/1.73 m^2    eGFR if non African American >60 >60 mL/min/1.73 m^2   Sedimentation rate, manual    Collection Time: 01/08/18  9:58 AM   Result Value Ref Range    Sed Rate 40 (H) 0 - 10 mm/Hr      Results for SHAGGY JASON (MRN [id number]) as of 9/25/2017 09:46   Ref. Range 8/29/2017 12:40   IgG - Serum Latest Ref Range: 650 - 1600 mg/dL 1202   IgM Latest Ref Range: 50 - 300 mg/dL 55   IgA Latest Ref Range: 40 - 350 mg/dL 643 (H)   Protein, Serum Latest Ref Range: 6.0 - 8.4 g/dL 7.0   Albumin grams/dl Latest Ref Range: 3.35 - 5.55 g/dL 3.39   Alpha-1 grams/dl Latest Ref Range: 0.17 - 0.41 g/dL 0.28   Alpha-2 grams/dl Latest Ref Range: 0.43 - 0.99 g/dL 0.99   Beta grams/dl Latest Ref Range: 0.50 - 1.10 g/dL 1.15 (H)   Gamma  grams/dl Latest Ref Range: 0.67 - 1.58 g/dL 1.19   Pathologist Interpretation SPE Unknown REVIEWED   Pathologist Interpretation DARSHANA Unknown REVIEWED   Immunofix Interp. Unknown SEE COMMENT     Assessment:       1. Persistent headaches    2. Elevated erythrocyte sedimentation rate    3. Primary osteoarthritis involving multiple joints    4. Primary osteoarthritis of right knee        Impression:    Chronic headaches with recent exacerbation, left-sided- not felt to be GCA, temporal artery biopsy is negative, symptoms not typical PMR, chronic shoulder pain, --currently on pred 5mg/d (weaning from 60mg/d); elevated sed rate/crp; increased joint pain today, takes low dose asa    Osteoarthritis of mult joints: chronic right shoulder pain, worsening left shoulder pain, neck, back, right knee    Increased sed rate/crp : unsure cause, spep with no monoclonal peaks, he does have increased beta globulins,     Diabetes mellitus with neuropathy and retinopathy- could be contributing to esr/crp?    Plan:         Inject right knee today   Increase prednisone to 15mg x 3 days, 10mg x 3 days back down to 5mg/day -hopefully will help with flare of joint pain  Ok for naproxen prn once down to 5mg/day prednisone  Cont low dose asa  zanaflex for neck, back spasm    rtc in 4 wks with reg labs early, will have him establish care with Dr. Lucero     Procedure note: After verbal consent was obtained.  The left/right knee was prepared with sterile prep.  The skin was anesthetized with 1% ethyl chloride.  The knee joint was injected with 3 cc of 1% lidocaine to anesthetize the knee.  The joint was then injected with 3mg celestone/soluspan, 80 mg Depomedrol and 1ml of 1% lidocaine.  Hemostasis was obtained.  The patient tolerated procedure well with no complications.  discharge and icing instructions given to patient

## 2018-01-11 NOTE — ASSESSMENT & PLAN NOTE
Unclear why he is having diffuse joint aches at this time.  No signs of infection or reaction to any medication.  Recommended using naproxen twice a day for the next week and I advised him if symptoms are not improving to please let me know.

## 2018-01-11 NOTE — PROGRESS NOTES
Subjective:       Patient ID: Crow Green is a 62 y.o. male.    Chief Complaint: Back Pain; Shoulder Pain (both ); and Knee Pain (right )    HPI  Here today complaining of multiple joint aches that started about 8 days ago.  He was seen by me approximately 9 days ago was doing fine at that time but the next day so whenever he woke up he, had to slide out of bed because he was having pain in both of his hips and both of her shoulders.  Not having any numbness or tingling.  Also having some right-sided knee pain.  Denies any coughing, shortness of breath.  No fever and no nausea.  No weakness of his lower or upper extremities.  Says he has not taken any medications since he is started with these symptoms.    Family History   Problem Relation Age of Onset    Glaucoma Mother     Cataracts Mother     Cataracts Father     Glaucoma Sister     Cataracts Sister     Glaucoma Maternal Aunt     Prostate cancer Neg Hx        Current Outpatient Prescriptions:     ALCOHOL ANTISEPTIC PADS (ALCOHOL PREP PADS TOP), , Disp: , Rfl:     amiodarone (PACERONE) 200 MG Tab, Take 1 tablet (200 mg total) by mouth once daily., Disp: 30 tablet, Rfl: 11    amlodipine (NORVASC) 10 MG tablet, Take 1 tablet (10 mg total) by mouth once daily., Disp: 90 tablet, Rfl: 3    ammonium lactate 12 % Crea, Apply 1 Act topically 2 (two) times daily. (Patient taking differently: Apply 1 Act topically once daily. ), Disp: 1 Tube, Rfl: 3    apixaban 5 mg Tab, Take 1 tablet (5 mg total) by mouth 2 (two) times daily., Disp: 60 tablet, Rfl: 1    atorvastatin (LIPITOR) 40 MG tablet, Take 1 tablet (40 mg total) by mouth every evening., Disp: 90 tablet, Rfl: 3    blood sugar diagnostic Strp, 1 strip by Misc.(Non-Drug; Combo Route) route 6 (six) times daily., Disp: 160 strip, Rfl: 11    doxepin (SINEQUAN) 150 MG Cap, , Disp: , Rfl:     duloxetine (CYMBALTA) 30 MG capsule, , Disp: , Rfl:     exenatide microspheres (BYDUREON BCISE) 2 mg/0.85 mL  "AtIn, Inject 2 mg into the skin every 7 days., Disp: 4 Syringe, Rfl: 11    fluticasone-salmeterol 250-50 mcg/dose (ADVAIR DISKUS) 250-50 mcg/dose diskus inhaler, Inhale 1 puff into the lungs 2 (two) times daily. Controller, Disp: 60 each, Rfl: 11    gabapentin (NEURONTIN) 300 MG capsule, Take 1 capsule (300 mg total) by mouth 3 (three) times daily., Disp: 270 capsule, Rfl: 3    insulin glargine (LANTUS SOLOSTAR) 100 unit/mL (3 mL) InPn pen, Inject 50 Units into the skin every evening. (Patient taking differently: Inject 60 Units into the skin every evening. ), Disp: 2 Box, Rfl: 11    insulin syringe-needle U-100 1 mL 31 gauge x 5/16 Syrg, , Disp: , Rfl:     latanoprost 0.005 % ophthalmic solution, Place 1 drop into both eyes every evening., Disp: 1 Bottle, Rfl: 4    lisinopril (PRINIVIL,ZESTRIL) 5 MG tablet, TAKE 1 TABLET (5 MG TOTAL) BY MOUTH ONCE DAILY., Disp: 90 tablet, Rfl: 2    metformin (GLUCOPHAGE) 1000 MG tablet, Take 1 tablet (1,000 mg total) by mouth 2 (two) times daily with meals., Disp: 180 tablet, Rfl: 3    metoprolol succinate (TOPROL-XL) 50 MG 24 hr tablet, TAKE 1 TABLET EVERY DAY, Disp: 30 tablet, Rfl: 5    NOVOLOG FLEXPEN 100 unit/mL InPn pen, ADMINISTER 20 UNITS UNDER THE SKIN THREE TIMES DAILY WITH MEALS, Disp: 15 mL, Rfl: 0    pantoprazole (PROTONIX) 40 MG tablet, Take 1 tablet (40 mg total) by mouth once daily., Disp: 30 tablet, Rfl: 11    pen needle, diabetic (BD INSULIN PEN NEEDLE UF SHORT) 31 gauge x 5/16" Ndle, Inject 1 each into the skin 3 (three) times daily., Disp: 90 each, Rfl: 11    tiotropium (SPIRIVA WITH HANDIHALER) 18 mcg inhalation capsule, Inhale 1 capsule (18 mcg total) into the lungs once daily. Controller, Disp: 90 capsule, Rfl: 3    trazodone (DESYREL) 150 MG tablet, , Disp: , Rfl:     TRUE METRIX AIR GLUCOSE METER kit, TEST FOUR TIMES DAILY BEFORE MEALS  AND EVERY NIGHT, Disp: 1 each, Rfl: 0    VENTOLIN HFA 90 mcg/actuation inhaler, , Disp: , Rfl:     aspirin " "(ECOTRIN) 81 MG EC tablet, Take 1 tablet (81 mg total) by mouth once daily., Disp: , Rfl: 0    naproxen (NAPROSYN) 500 MG tablet, Take 1 tablet (500 mg total) by mouth 2 (two) times daily., Disp: 14 tablet, Rfl: 0    nitroGLYCERIN (NITROSTAT) 0.3 MG SL tablet, Place 1 tablet (0.3 mg total) under the tongue every 5 (five) minutes as needed for Chest pain. No more than 3 tablets in one day., Disp: 30 tablet, Rfl: 1    Review of Systems   Constitutional: Negative for chills and fever.   Eyes: Negative for visual disturbance.   Respiratory: Negative for cough and shortness of breath.    Cardiovascular: Negative for chest pain.   Gastrointestinal: Negative for abdominal pain.   Musculoskeletal: Positive for arthralgias, back pain and gait problem.   Neurological: Negative for dizziness.       Objective:   /68   Pulse 94   Temp 96.4 °F (35.8 °C) (Tympanic)   Resp 18   Ht 5' 4.5" (1.638 m)   Wt 95.8 kg (211 lb 3.2 oz)   SpO2 (!) 80%   BMI 35.69 kg/m²      Physical Exam   Constitutional: He is oriented to person, place, and time. He appears well-developed and well-nourished.   HENT:   Head: Normocephalic and atraumatic.   Eyes: Conjunctivae are normal.   Cardiovascular: Normal rate.    Pulmonary/Chest: Effort normal. No respiratory distress.   Musculoskeletal: He exhibits no edema.   Does have some pain with range of motion of his hips as well as shoulders.  No weakness identified in upper or lower extremities.  Did have some crepitus to range of motion of his shoulders.  No muscle spasms.   Neurological: He is alert and oriented to person, place, and time. Coordination normal.   Skin: Skin is warm and dry. No rash noted.   Psychiatric: He has a normal mood and affect. His behavior is normal.   Vitals reviewed.      Assessment & Plan     Problem List Items Addressed This Visit        Orthopedic    Arthritis - Primary    Current Assessment & Plan     Unclear why he is having diffuse joint aches at this time.  " No signs of infection or reaction to any medication.  Recommended using naproxen twice a day for the next week and I advised him if symptoms are not improving to please let me know.                 Follow-up if symptoms worsen or fail to improve.

## 2018-01-12 ENCOUNTER — TELEPHONE (OUTPATIENT)
Dept: INTERNAL MEDICINE | Facility: CLINIC | Age: 63
End: 2018-01-12

## 2018-01-12 ENCOUNTER — OFFICE VISIT (OUTPATIENT)
Dept: RHEUMATOLOGY | Facility: CLINIC | Age: 63
End: 2018-01-12
Payer: MEDICARE

## 2018-01-12 VITALS
DIASTOLIC BLOOD PRESSURE: 81 MMHG | WEIGHT: 214.75 LBS | HEART RATE: 88 BPM | HEIGHT: 65 IN | BODY MASS INDEX: 35.78 KG/M2 | SYSTOLIC BLOOD PRESSURE: 129 MMHG

## 2018-01-12 DIAGNOSIS — R51.9 PERSISTENT HEADACHES: Primary | ICD-10-CM

## 2018-01-12 DIAGNOSIS — R70.0 ELEVATED ERYTHROCYTE SEDIMENTATION RATE: ICD-10-CM

## 2018-01-12 DIAGNOSIS — M15.9 PRIMARY OSTEOARTHRITIS INVOLVING MULTIPLE JOINTS: ICD-10-CM

## 2018-01-12 DIAGNOSIS — M17.11 PRIMARY OSTEOARTHRITIS OF RIGHT KNEE: ICD-10-CM

## 2018-01-12 PROCEDURE — 20610 DRAIN/INJ JOINT/BURSA W/O US: CPT | Mod: RT,S$GLB,, | Performed by: PHYSICIAN ASSISTANT

## 2018-01-12 PROCEDURE — 99214 OFFICE O/P EST MOD 30 MIN: CPT | Mod: 25,S$GLB,, | Performed by: PHYSICIAN ASSISTANT

## 2018-01-12 PROCEDURE — 99999 PR PBB SHADOW E&M-EST. PATIENT-LVL III: CPT | Mod: PBBFAC,,, | Performed by: PHYSICIAN ASSISTANT

## 2018-01-12 RX ORDER — BETAMETHASONE SODIUM PHOSPHATE AND BETAMETHASONE ACETATE 3; 3 MG/ML; MG/ML
3 INJECTION, SUSPENSION INTRA-ARTICULAR; INTRALESIONAL; INTRAMUSCULAR; SOFT TISSUE
Status: COMPLETED | OUTPATIENT
Start: 2018-01-12 | End: 2018-01-12

## 2018-01-12 RX ORDER — METHYLPREDNISOLONE ACETATE 80 MG/ML
80 INJECTION, SUSPENSION INTRA-ARTICULAR; INTRALESIONAL; INTRAMUSCULAR; SOFT TISSUE
Status: COMPLETED | OUTPATIENT
Start: 2018-01-12 | End: 2018-01-12

## 2018-01-12 RX ORDER — TIZANIDINE 4 MG/1
4 TABLET ORAL 3 TIMES DAILY PRN
Qty: 90 TABLET | Refills: 0 | Status: SHIPPED | OUTPATIENT
Start: 2018-01-12 | End: 2018-01-22

## 2018-01-12 RX ADMIN — BETAMETHASONE SODIUM PHOSPHATE AND BETAMETHASONE ACETATE 3 MG: 3; 3 INJECTION, SUSPENSION INTRA-ARTICULAR; INTRALESIONAL; INTRAMUSCULAR; SOFT TISSUE at 10:01

## 2018-01-12 RX ADMIN — METHYLPREDNISOLONE ACETATE 80 MG: 80 INJECTION, SUSPENSION INTRA-ARTICULAR; INTRALESIONAL; INTRAMUSCULAR; SOFT TISSUE at 10:01

## 2018-01-12 NOTE — PATIENT INSTRUCTIONS
Increase prednisone to 15mg/day for 3 days, then 10mg day for 3 days then back down to 5mg/day then you can take naproxen from your pcp    Shot in knee today     No decrease in prednisone for now    Back for recheck in a month with new doctor

## 2018-01-12 NOTE — TELEPHONE ENCOUNTER
----- Message from Awilda Jamison sent at 1/12/2018 12:23 PM CST -----  Contact: anthony/[employer] [phone] ID# [id number]  States that she is calling to check status for back brace and knee brace. Please call back at [phone] id# [id number]//thank you acc

## 2018-01-19 ENCOUNTER — TELEPHONE (OUTPATIENT)
Dept: DIABETES | Facility: CLINIC | Age: 63
End: 2018-01-19

## 2018-01-19 NOTE — TELEPHONE ENCOUNTER
----- Message from Ly Syed sent at 1/19/2018  9:27 AM CST -----  Contact: Pelon with [employer]  Pelon called and stated he needed to speak to the nurse. He stated that he is checking the status of a request for supplies for this pt. He can be reached at [phone].     Thanks,  TF

## 2018-01-24 RX ORDER — GLIMEPIRIDE 2 MG/1
TABLET ORAL
Qty: 90 TABLET | Refills: 0 | Status: SHIPPED | OUTPATIENT
Start: 2018-01-24 | End: 2018-06-08 | Stop reason: SDUPTHER

## 2018-02-05 RX ORDER — METOPROLOL SUCCINATE 50 MG/1
TABLET, EXTENDED RELEASE ORAL
Qty: 90 TABLET | Refills: 5 | Status: ON HOLD | OUTPATIENT
Start: 2018-02-05 | End: 2019-08-08 | Stop reason: HOSPADM

## 2018-02-10 ENCOUNTER — HOSPITAL ENCOUNTER (EMERGENCY)
Facility: HOSPITAL | Age: 63
Discharge: HOME | End: 2018-02-10
Attending: EMERGENCY MEDICINE
Payer: MEDICARE

## 2018-02-10 VITALS
SYSTOLIC BLOOD PRESSURE: 141 MMHG | BODY MASS INDEX: 34.99 KG/M2 | DIASTOLIC BLOOD PRESSURE: 86 MMHG | TEMPERATURE: 98 F | RESPIRATION RATE: 20 BRPM | HEART RATE: 90 BPM | HEIGHT: 65 IN | WEIGHT: 210 LBS | OXYGEN SATURATION: 96 %

## 2018-02-10 DIAGNOSIS — M25.552 HIP PAIN, ACUTE, LEFT: ICD-10-CM

## 2018-02-10 DIAGNOSIS — M54.32 SCIATICA OF LEFT SIDE: Primary | ICD-10-CM

## 2018-02-10 PROCEDURE — 99284 EMERGENCY DEPT VISIT MOD MDM: CPT | Mod: 25

## 2018-02-10 PROCEDURE — 96372 THER/PROPH/DIAG INJ SC/IM: CPT | Mod: 59

## 2018-02-10 PROCEDURE — 96374 THER/PROPH/DIAG INJ IV PUSH: CPT

## 2018-02-10 PROCEDURE — 63600175 PHARM REV CODE 636 W HCPCS: Performed by: EMERGENCY MEDICINE

## 2018-02-10 RX ORDER — ORPHENADRINE CITRATE 30 MG/ML
60 INJECTION INTRAMUSCULAR; INTRAVENOUS
Status: COMPLETED | OUTPATIENT
Start: 2018-02-10 | End: 2018-02-10

## 2018-02-10 RX ORDER — KETOROLAC TROMETHAMINE 30 MG/ML
15 INJECTION, SOLUTION INTRAMUSCULAR; INTRAVENOUS
Status: COMPLETED | OUTPATIENT
Start: 2018-02-10 | End: 2018-02-10

## 2018-02-10 RX ORDER — HYDROCODONE BITARTRATE AND ACETAMINOPHEN 5; 325 MG/1; MG/1
1 TABLET ORAL EVERY 4 HOURS PRN
Qty: 12 TABLET | Refills: 0 | Status: SHIPPED | OUTPATIENT
Start: 2018-02-10 | End: 2018-02-12

## 2018-02-10 RX ADMIN — KETOROLAC TROMETHAMINE 15 MG: 30 INJECTION, SOLUTION INTRAMUSCULAR at 07:02

## 2018-02-10 RX ADMIN — ORPHENADRINE CITRATE 60 MG: 30 INJECTION INTRAMUSCULAR; INTRAVENOUS at 07:02

## 2018-02-10 NOTE — ED NOTES
"Improvement in pain, still having facial grimace while sitting on side of bed. Rates pain as a "7". Reported to dr osborne.  "

## 2018-02-10 NOTE — ED PROVIDER NOTES
SCRIBE #1 NOTE: I, Fabiola Rosas, am scribing for, and in the presence of, Marina Mtz MD. I have scribed the entire note.      History      Chief Complaint   Patient presents with    Hip Pain     pt c/o left hip pain since Wed; radiates down leg, denies injury; received  ml PTA per [facility]        Review of patient's allergies indicates:  No Known Allergies     HPI   HPI    2/10/2018, 7:35 AM   History obtained from the patient      History of Present Illness: Crow Green is a 62 y.o. male patient who presents to the Emergency Department for L hip pain which onset 3 days ago. Symptoms are constant and moderate in severity. Pt denies injury to hip. Pt received  ml PTA per [facility]. Pt reports walking exacerbates sxs. No other mitigating or exacerbating factors reported. Associated sxs include dizziness and L lower back pain. Patient denies any CP, SOB, fever, chills, bowel/bladder incontinence, weakness/tingling, and all other sxs at this time. Prior Tx includes Tylenol with no relief. No further complaints or concerns at this time.         Arrival mode: AAS    PCP: Mateo Lambert DO       Past Medical History:  Past Medical History:   Diagnosis Date    Arthritis     Asthma     Depression     Diabetes mellitus     Diabetes mellitus, type 2 Diagnosed in 2000    Elevated PSA     Hypertension        Past Surgical History:  Past Surgical History:   Procedure Laterality Date    CHOLECYSTECTOMY           Family History:  Family History   Problem Relation Age of Onset    Glaucoma Mother     Cataracts Mother     Cataracts Father     Glaucoma Sister     Cataracts Sister     Glaucoma Maternal Aunt     Prostate cancer Neg Hx        Social History:  Social History     Social History Main Topics    Smoking status: Never Smoker    Smokeless tobacco: Never Used    Alcohol use No    Drug use: No    Sexual activity: Not Currently       ROS   Review of Systems   Constitutional: Negative for  fever.   HENT: Negative for sore throat.    Respiratory: Negative for shortness of breath.    Cardiovascular: Negative for chest pain.   Gastrointestinal: Negative for abdominal pain, diarrhea, nausea and vomiting.        (-) bowel incontinence   Genitourinary: Negative for dysuria.        (-) Bladder incontinence   Musculoskeletal: Positive for back pain.        (+) L hip pain   Skin: Negative for rash.   Neurological: Positive for dizziness. Negative for weakness, numbness and headaches.   Hematological: Does not bruise/bleed easily.   All other systems reviewed and are negative.      Physical Exam      Initial Vitals [02/10/18 0727]   BP Pulse Resp Temp SpO2   (!) 163/80 96 20 98 °F (36.7 °C) --      MAP       107.67          Physical Exam  Nursing Notes and Vital Signs Reviewed.  Constitutional: Patient is in no acute distress. Well-developed and well-nourished.  Head: Atraumatic. Normocephalic.  Eyes: PERRL. EOM intact. Conjunctivae are not pale. No scleral icterus.  ENT: Mucous membranes are moist. Oropharynx is clear and symmetric.    Neck: Supple. Full ROM. No lymphadenopathy.  Cardiovascular: Regular rate. Regular rhythm. No murmurs, rubs, or gallops. Distal pulses are 2+ and symmetric.  Pulmonary/Chest: No respiratory distress. Clear to auscultation bilaterally. No wheezing or rales.  Abdominal: Soft and non-distended.  There is no tenderness.  No rebound, guarding, or rigidity. Good bowel sounds.  Genitourinary: No CVA tenderness  Musculoskeletal: Moves all extremities. No obvious deformities. No edema. No calf tenderness. Pain with external rotation of hip. L lower back tenderness  Skin: Warm and dry.  Neurological:  Alert, awake, and appropriate.  Normal speech.  No acute focal neurological deficits are appreciated.  Psychiatric: Normal affect. Good eye contact. Appropriate in content.    ED Course    Procedures  ED Vital Signs:  Vitals:    02/10/18 0727 02/10/18 0854   BP: (!) 163/80 (!) 141/86  "  Pulse: 96 90   Resp: 20 20   Temp: 98 °F (36.7 °C)    TempSrc: Oral    SpO2:  96%   Weight: 95.3 kg (210 lb)    Height: 5' 5" (1.651 m)        Abnormal Lab Results:  Labs Reviewed - No data to display     All Lab Results:  Results for orders placed or performed in visit on 01/08/18   CBC auto differential   Result Value Ref Range    WBC 8.39 3.90 - 12.70 K/uL    RBC 4.94 4.60 - 6.20 M/uL    Hemoglobin 14.3 14.0 - 18.0 g/dL    Hematocrit 43.8 40.0 - 54.0 %    MCV 89 82 - 98 fL    MCH 28.9 27.0 - 31.0 pg    MCHC 32.6 32.0 - 36.0 g/dL    RDW 13.9 11.5 - 14.5 %    Platelets 180 150 - 350 K/uL    MPV 10.2 9.2 - 12.9 fL    Gran # (ANC) 4.5 1.8 - 7.7 K/uL    Lymph # 3.0 1.0 - 4.8 K/uL    Mono # 0.8 0.3 - 1.0 K/uL    Eos # 0.1 0.0 - 0.5 K/uL    Baso # 0.01 0.00 - 0.20 K/uL    Gran% 54.1 38.0 - 73.0 %    Lymph% 35.8 18.0 - 48.0 %    Mono% 9.4 4.0 - 15.0 %    Eosinophil% 0.6 0.0 - 8.0 %    Basophil% 0.1 0.0 - 1.9 %    Differential Method Automated    Comprehensive metabolic panel   Result Value Ref Range    Sodium 141 136 - 145 mmol/L    Potassium 3.8 3.5 - 5.1 mmol/L    Chloride 101 95 - 110 mmol/L    CO2 28 23 - 29 mmol/L    Glucose 294 (H) 70 - 110 mg/dL    BUN, Bld 11 6 - 20 mg/dL    Creatinine 1.1 0.5 - 1.4 mg/dL    Calcium 9.5 8.7 - 10.5 mg/dL    Total Protein 7.7 6.0 - 8.4 g/dL    Albumin 3.4 (L) 3.5 - 5.2 g/dL    Total Bilirubin 0.2 0.1 - 1.0 mg/dL    Alkaline Phosphatase 82 55 - 135 U/L    AST 43 (H) 10 - 40 U/L    ALT 45 (H) 10 - 44 U/L    Anion Gap 12 8 - 16 mmol/L    eGFR if African American >60 >60 mL/min/1.73 m^2    eGFR if non African American >60 >60 mL/min/1.73 m^2   C-reactive protein   Result Value Ref Range    CRP 8.4 (H) 0.0 - 8.2 mg/L   Sedimentation rate, manual   Result Value Ref Range    Sed Rate 40 (H) 0 - 10 mm/Hr         Imaging Results:  Imaging Results          X-Ray Lumbar Spine Ap And Lateral (Final result)  Result time 02/10/18 09:09:27   Procedure changed from X-Ray Lumbar Spine Complete 5 " View     Final result by Sae Velazquez MD (02/10/18 09:09:27)                 Impression:         No acute fracture or subluxation. Bilateral facet joint osteoarthritis at L4-L5 and L5-S1.      Electronically signed by: SAE VELAZQUEZ MD  Date:     02/10/18  Time:    09:09              Narrative:    Lumbar spine, 5 views    Clinical History:   Pain in the low back.    Findings:      The vertebral body heights and alignment are within normal limits. No acute fracture or subluxation.There is bilateral facet joint osteoarthritis at L4-L5 and L5-S1.  There is a very subtle dextroscoliosis of lumbar spine centered at L2-L3.                             X-Ray Hip 2 View Left (Final result)  Result time 02/10/18 09:08:40    Final result by Sae Velazquez MD (02/10/18 09:08:40)                 Impression:         Negative.  No left hip fracture or dislocation or significant degenerative change.        Electronically signed by: SAE VELAZQUEZ MD  Date:     02/10/18  Time:    09:08              Narrative:    Exam: XR HIP 2 VIEW LEFT    Clinical History:    Pain in the left hip.    Findings:      No osseous, articular, or soft tissue abnormality demonstrated.                                      The Emergency Provider reviewed the vital signs and test results, which are outlined above.    ED Discussion     9:51 AM: Reassessed pt at this time. Discussed with pt all pertinent ED information and results. Discussed pt dx and plan of tx. Gave pt all f/u and return to the ED instructions. All questions and concerns were addressed at this time. Pt expresses understanding of information and instructions, and is comfortable with plan to discharge. Pt is stable for discharge.        ED Medication(s):  Medications   ketorolac injection 15 mg (15 mg Intravenous Given 2/10/18 0755)   orphenadrine injection 60 mg (60 mg Intramuscular Given 2/10/18 0755)       Discharge Medication List as of 2/10/2018  9:50 AM      START taking these medications     Details   hydrocodone-acetaminophen 5-325mg (NORCO) 5-325 mg per tablet Take 1 tablet by mouth every 4 (four) hours as needed for Pain., Starting Sat 2/10/2018, Until Tue 2/20/2018, Print             Follow-up Information     Mateo Lambert DO In 2 days.    Specialty:  Family Medicine  Contact information:  [address] [address] [address]  [phone]             [facility] - .    Specialty:  Emergency Medicine  Why:  As needed, If symptoms worsen  Contact information:  [address] [address]  [facility] [address] [phone]                   Medical Decision Making    Medical Decision Making:   Clinical Tests:   Radiological Study: Ordered and Reviewed           Scribe Attestation:   Scribe #1: I performed the above scribed service and the documentation accurately describes the services I performed. I attest to the accuracy of the note.    Attending:   Physician Attestation Statement for Scribe #1: I, Marina Mtz MD, personally performed the services described in this documentation, as scribed by Fabiola Rosas, in my presence, and it is both accurate and complete.          Clinical Impression       ICD-10-CM ICD-9-CM   1. Sciatica of left side M54.32 724.3   2. Hip pain, acute, left M25.552 719.45       Disposition:   Disposition: Discharged  Condition: Stable         Marina Mtz MD  02/11/18 0619

## 2018-02-12 ENCOUNTER — OFFICE VISIT (OUTPATIENT)
Dept: INTERNAL MEDICINE | Facility: CLINIC | Age: 63
End: 2018-02-12
Payer: MEDICARE

## 2018-02-12 VITALS
HEIGHT: 65 IN | TEMPERATURE: 99 F | SYSTOLIC BLOOD PRESSURE: 118 MMHG | HEART RATE: 71 BPM | WEIGHT: 216 LBS | BODY MASS INDEX: 35.99 KG/M2 | DIASTOLIC BLOOD PRESSURE: 70 MMHG | OXYGEN SATURATION: 98 %

## 2018-02-12 DIAGNOSIS — M54.32 LEFT SIDED SCIATICA: Primary | ICD-10-CM

## 2018-02-12 PROCEDURE — 96372 THER/PROPH/DIAG INJ SC/IM: CPT | Mod: S$GLB,,, | Performed by: FAMILY MEDICINE

## 2018-02-12 PROCEDURE — 99999 PR PBB SHADOW E&M-EST. PATIENT-LVL III: CPT | Mod: PBBFAC,,, | Performed by: FAMILY MEDICINE

## 2018-02-12 PROCEDURE — 3008F BODY MASS INDEX DOCD: CPT | Mod: S$GLB,,, | Performed by: FAMILY MEDICINE

## 2018-02-12 PROCEDURE — 99214 OFFICE O/P EST MOD 30 MIN: CPT | Mod: 25,S$GLB,, | Performed by: FAMILY MEDICINE

## 2018-02-12 RX ORDER — KETOROLAC TROMETHAMINE 30 MG/ML
30 INJECTION, SOLUTION INTRAMUSCULAR; INTRAVENOUS
Status: DISCONTINUED | OUTPATIENT
Start: 2018-02-12 | End: 2018-02-12

## 2018-02-12 RX ORDER — NAPROXEN 500 MG/1
500 TABLET ORAL 2 TIMES DAILY
Qty: 14 TABLET | Refills: 0 | Status: SHIPPED | OUTPATIENT
Start: 2018-02-12 | End: 2018-02-27 | Stop reason: SDUPTHER

## 2018-02-12 RX ORDER — KETOROLAC TROMETHAMINE 30 MG/ML
30 INJECTION, SOLUTION INTRAMUSCULAR; INTRAVENOUS
Status: COMPLETED | OUTPATIENT
Start: 2018-02-12 | End: 2018-02-12

## 2018-02-12 RX ADMIN — KETOROLAC TROMETHAMINE 30 MG: 30 INJECTION, SOLUTION INTRAMUSCULAR; INTRAVENOUS at 03:02

## 2018-02-12 NOTE — ASSESSMENT & PLAN NOTE
Recommended using the heating pad, physical therapy referral, intramuscular Toradol injection and starting on naproxen in 2 days.  Told him that no opioid medication would be warranted

## 2018-02-14 ENCOUNTER — TELEPHONE (OUTPATIENT)
Dept: RHEUMATOLOGY | Facility: CLINIC | Age: 63
End: 2018-02-14

## 2018-02-14 ENCOUNTER — TELEPHONE (OUTPATIENT)
Dept: INTERNAL MEDICINE | Facility: CLINIC | Age: 63
End: 2018-02-14

## 2018-02-14 RX ORDER — HYDROCODONE BITARTRATE AND ACETAMINOPHEN 10; 325 MG/1; MG/1
1 TABLET ORAL EVERY 12 HOURS PRN
Qty: 14 TABLET | Refills: 0 | Status: SHIPPED | OUTPATIENT
Start: 2018-02-14 | End: 2018-03-16 | Stop reason: SDUPTHER

## 2018-02-14 NOTE — TELEPHONE ENCOUNTER
----- Message from Carla Dillard sent at 2/14/2018  4:52 PM CST -----  Contact: self   Please call patient back to notify him when called in.    1. What is the name of the medication you are requesting? muscle relaxers   2. What is the dose? n/a  3. How do you take the medication? Orally, topically, etc? orally  4. How often do you take this medication? n/a  5. Do you need a 30 day or 90 day supply? n/a  6. How many refills are you requesting? n/a   7. What is your preferred pharmacy and location of the pharmacy?   [facility] - [place], [place] - [address] AT [facility]  [address] [place] [address]  Phone: [phone] Fax: [phone]  8. Who can we contact with further questions? [phone]

## 2018-02-14 NOTE — TELEPHONE ENCOUNTER
----- Message from Laurence Pena sent at 2/14/2018  3:44 PM CST -----  Contact: pt  Calling about Rx medication is not working for him and please advise [phone]

## 2018-02-14 NOTE — TELEPHONE ENCOUNTER
Will give only a low supply of norco. He should continue taking the naproxen as I know he only just started it. Has he started going to PT yet?

## 2018-02-14 NOTE — TELEPHONE ENCOUNTER
Nurse returned pt call, pt stated medication naproxen 500 mg is not helping relieve pain at all. Please advise

## 2018-02-15 ENCOUNTER — TELEPHONE (OUTPATIENT)
Dept: RHEUMATOLOGY | Facility: CLINIC | Age: 63
End: 2018-02-15

## 2018-02-15 ENCOUNTER — TELEPHONE (OUTPATIENT)
Dept: INTERNAL MEDICINE | Facility: CLINIC | Age: 63
End: 2018-02-15

## 2018-02-15 NOTE — TELEPHONE ENCOUNTER
----- Message from Babs Sumner LPN sent at 2/14/2018  4:56 PM CST -----  Message   Received: Today    Refill   Message Contents   Carla ARMENDARIZ Staff  Caller: self (Today,  4:52 PM)         Please call patient back to notify him when called in.     1. What is the name of the medication you are requesting? muscle relaxers   2. What is the dose? n/a   3. How do you take the medication? Orally, topically, etc? orally   4. How often do you take this medication? n/a   5. Do you need a 30 day or 90 day supply? n/a   6. How many refills are you requesting? n/a   7. What is your preferred pharmacy and location of the pharmacy?   [facility] [address] [place] [address] - [address] AT [place] [facility]   [address] [place] [address]   Phone: [phone] Fax: [phone]   8. Who can we contact with further questions? [phone]

## 2018-02-15 NOTE — TELEPHONE ENCOUNTER
----- Message from Favian Amaya sent at 2/15/2018 12:02 PM CST -----  Contact: Pt  Please give pt a call at ..) [phone] (work) regarding a referral for physical therapy.

## 2018-02-16 ENCOUNTER — TELEPHONE (OUTPATIENT)
Dept: INTERNAL MEDICINE | Facility: CLINIC | Age: 63
End: 2018-02-16

## 2018-02-16 NOTE — TELEPHONE ENCOUNTER
----- Message from Kaila Vaughan sent at 2/16/2018 11:43 AM CST -----  Contact: Mohamud/[employer]  States he's calling regarding a knee and back brace. Please call Mohamud at [phone]. Thank you

## 2018-02-16 NOTE — TELEPHONE ENCOUNTER
Nurse spoke with representative in regards to paperwork, representative informed paperwork has not be completed at this time and will follow up with doctor for status.

## 2018-02-27 ENCOUNTER — LAB VISIT (OUTPATIENT)
Dept: LAB | Facility: HOSPITAL | Age: 63
End: 2018-02-27
Attending: PHYSICIAN ASSISTANT
Payer: MEDICARE

## 2018-02-27 ENCOUNTER — OFFICE VISIT (OUTPATIENT)
Dept: INTERNAL MEDICINE | Facility: CLINIC | Age: 63
End: 2018-02-27
Payer: MEDICARE

## 2018-02-27 ENCOUNTER — TELEPHONE (OUTPATIENT)
Dept: CARDIOLOGY | Facility: CLINIC | Age: 63
End: 2018-02-27

## 2018-02-27 ENCOUNTER — OFFICE VISIT (OUTPATIENT)
Dept: DIABETES | Facility: CLINIC | Age: 63
End: 2018-02-27
Payer: MEDICARE

## 2018-02-27 VITALS
DIASTOLIC BLOOD PRESSURE: 84 MMHG | BODY MASS INDEX: 34.97 KG/M2 | SYSTOLIC BLOOD PRESSURE: 136 MMHG | HEIGHT: 65 IN | WEIGHT: 209.88 LBS

## 2018-02-27 VITALS
TEMPERATURE: 96 F | SYSTOLIC BLOOD PRESSURE: 164 MMHG | OXYGEN SATURATION: 98 % | HEART RATE: 115 BPM | DIASTOLIC BLOOD PRESSURE: 70 MMHG | RESPIRATION RATE: 18 BRPM | WEIGHT: 211.88 LBS | HEIGHT: 65 IN | BODY MASS INDEX: 35.3 KG/M2

## 2018-02-27 DIAGNOSIS — E21.3 HYPERPARATHYROIDISM: ICD-10-CM

## 2018-02-27 DIAGNOSIS — E21.5 DISORDER OF PARATHYROID GLAND: ICD-10-CM

## 2018-02-27 DIAGNOSIS — M54.32 LEFT SIDED SCIATICA: Primary | ICD-10-CM

## 2018-02-27 LAB
25(OH)D3+25(OH)D2 SERPL-MCNC: 12 NG/ML
CA-I BLDV-SCNC: 1.18 MMOL/L
ESTIMATED AVG GLUCOSE: 246 MG/DL
GLUCOSE SERPL-MCNC: 82 MG/DL (ref 70–110)
HBA1C MFR BLD HPLC: 10.2 %
PTH-INTACT SERPL-MCNC: 49 PG/ML

## 2018-02-27 PROCEDURE — 99999 PR PBB SHADOW E&M-EST. PATIENT-LVL II: CPT | Mod: PBBFAC,,, | Performed by: PHYSICIAN ASSISTANT

## 2018-02-27 PROCEDURE — 82306 VITAMIN D 25 HYDROXY: CPT

## 2018-02-27 PROCEDURE — 3079F DIAST BP 80-89 MM HG: CPT | Mod: S$GLB,,, | Performed by: PHYSICIAN ASSISTANT

## 2018-02-27 PROCEDURE — 83036 HEMOGLOBIN GLYCOSYLATED A1C: CPT

## 2018-02-27 PROCEDURE — 99999 PR PBB SHADOW E&M-EST. PATIENT-LVL IV: CPT | Mod: PBBFAC,,, | Performed by: FAMILY MEDICINE

## 2018-02-27 PROCEDURE — 3074F SYST BP LT 130 MM HG: CPT | Mod: S$GLB,,, | Performed by: FAMILY MEDICINE

## 2018-02-27 PROCEDURE — 3075F SYST BP GE 130 - 139MM HG: CPT | Mod: S$GLB,,, | Performed by: PHYSICIAN ASSISTANT

## 2018-02-27 PROCEDURE — 99214 OFFICE O/P EST MOD 30 MIN: CPT | Mod: S$GLB,,, | Performed by: FAMILY MEDICINE

## 2018-02-27 PROCEDURE — 3078F DIAST BP <80 MM HG: CPT | Mod: S$GLB,,, | Performed by: FAMILY MEDICINE

## 2018-02-27 PROCEDURE — 82948 REAGENT STRIP/BLOOD GLUCOSE: CPT | Mod: S$GLB,,, | Performed by: PHYSICIAN ASSISTANT

## 2018-02-27 PROCEDURE — 83970 ASSAY OF PARATHORMONE: CPT

## 2018-02-27 PROCEDURE — 82330 ASSAY OF CALCIUM: CPT

## 2018-02-27 PROCEDURE — 99214 OFFICE O/P EST MOD 30 MIN: CPT | Mod: S$GLB,,, | Performed by: PHYSICIAN ASSISTANT

## 2018-02-27 PROCEDURE — 36415 COLL VENOUS BLD VENIPUNCTURE: CPT | Mod: PO

## 2018-02-27 RX ORDER — NAPROXEN 500 MG/1
500 TABLET ORAL 2 TIMES DAILY WITH MEALS
Qty: 14 TABLET | Refills: 0 | Status: SHIPPED | OUTPATIENT
Start: 2018-02-27 | End: 2018-04-19 | Stop reason: SDUPTHER

## 2018-02-27 RX ORDER — HYDROCODONE BITARTRATE AND ACETAMINOPHEN 10; 325 MG/1; MG/1
1 TABLET ORAL EVERY 12 HOURS PRN
Qty: 14 TABLET | Refills: 0 | Status: SHIPPED | OUTPATIENT
Start: 2018-02-27 | End: 2018-03-07 | Stop reason: SDUPTHER

## 2018-02-27 NOTE — PROGRESS NOTES
Subjective:      Patient ID: Crow Green is a 62 y.o. male.    PCP: Mateo Lambert DO      Crow Green is a pleasant 62 y.o. male presenting to follow up on diabetes mellitus. He has had diabetes for 17 or more years. His last visit in Diabetes Management was 1/8/2018. Since that time he has had no improvement in his glycemia. His blood sugar range fasting has been  and 2 hour post meal has been 130-140, and he has been monitoring 2 times per day. His current concerns are glycemic control.    He is also noted to have hyperparathyroidism secondary to Vitamin D deficiency (Last level was 11 in 2014). He has not had a bone density, although he does have low testosterone. Will start low dose replacement of vitamin at 800 IU daily. Any higher dose may worsen PTH.  We'll also get workup of parathyroid including bone density since he also has comorbidity of hypogonadism, as well as ionized calcium, and 24-hour urine for urine calcium.      He denies any hospital admissions, emergency room visits, hypoglycemia, syncope, diaphoresis, chest pain, or dyspnea.    He has lost 7 pounds since last visit. His BMI is 34.93 kg/m².    His blood sugar in the clinic today was:   Lab Results   Component Value Date    POCGLU 87 02/27/2018       We discussed the American diabetes Association recommendations:  hemoglobin A1c below 7.0%; all diabetics should be on statins unless contraindicated; one aspirin daily unless contraindicated; fasting blood sugar between 80 and 130 mg/dL; postprandial blood sugar below 180 mg/dl; prevention of hypoglycemia, may adjust goals to higher levels if persistent; ACE or ARB therapy if not contraindicated; and maintain in an ideal body weight with BMI below 25.    Crow is noncompliant some of the time with DM medications.     Crow is noncompliant much of the time with lifestyle modifications to include activity and meal planning.       Current Outpatient Prescriptions:      ALCOHOL ANTISEPTIC PADS (ALCOHOL PREP PADS TOP), , Disp: , Rfl:     amiodarone (PACERONE) 200 MG Tab, Take 1 tablet (200 mg total) by mouth once daily., Disp: 30 tablet, Rfl: 11    amlodipine (NORVASC) 10 MG tablet, Take 1 tablet (10 mg total) by mouth once daily., Disp: 90 tablet, Rfl: 3    ammonium lactate 12 % Crea, Apply 1 Act topically 2 (two) times daily. (Patient taking differently: Apply 1 Act topically once daily. ), Disp: 1 Tube, Rfl: 3    apixaban 5 mg Tab, Take 1 tablet (5 mg total) by mouth 2 (two) times daily., Disp: 60 tablet, Rfl: 1    aspirin (ECOTRIN) 81 MG EC tablet, Take 1 tablet (81 mg total) by mouth once daily., Disp: , Rfl: 0    atorvastatin (LIPITOR) 40 MG tablet, Take 1 tablet (40 mg total) by mouth every evening., Disp: 90 tablet, Rfl: 3    doxepin (SINEQUAN) 150 MG Cap, , Disp: , Rfl:     duloxetine (CYMBALTA) 30 MG capsule, , Disp: , Rfl:     exenatide microspheres (BYDUREON BCISE) 2 mg/0.85 mL AtIn, Inject 2 mg into the skin every 7 days., Disp: 4 Syringe, Rfl: 11    fluticasone-salmeterol 250-50 mcg/dose (ADVAIR DISKUS) 250-50 mcg/dose diskus inhaler, Inhale 1 puff into the lungs 2 (two) times daily. Controller, Disp: 60 each, Rfl: 11    gabapentin (NEURONTIN) 300 MG capsule, Take 1 capsule (300 mg total) by mouth 3 (three) times daily., Disp: 270 capsule, Rfl: 3    glimepiride (AMARYL) 2 MG tablet, TAKE 1 TABLET BY MOUTH DAILY BEFORE BREAKFAST, Disp: 90 tablet, Rfl: 0    insulin glargine (LANTUS SOLOSTAR) 100 unit/mL (3 mL) InPn pen, Inject 50 Units into the skin every evening. (Patient taking differently: Inject 60 Units into the skin every evening. ), Disp: 2 Box, Rfl: 11    insulin syringe-needle U-100 1 mL 31 gauge x 5/16 Syrg, , Disp: , Rfl:     latanoprost 0.005 % ophthalmic solution, Place 1 drop into both eyes every evening., Disp: 1 Bottle, Rfl: 4    lisinopril (PRINIVIL,ZESTRIL) 5 MG tablet, TAKE 1 TABLET (5 MG TOTAL) BY MOUTH ONCE DAILY., Disp: 90 tablet,  "Rfl: 2    metformin (GLUCOPHAGE) 1000 MG tablet, Take 1 tablet (1,000 mg total) by mouth 2 (two) times daily with meals., Disp: 180 tablet, Rfl: 3    metoprolol succinate (TOPROL-XL) 50 MG 24 hr tablet, TAKE 1 TABLET EVERY DAY, Disp: 90 tablet, Rfl: 5    naproxen (NAPROSYN) 500 MG tablet, Take 1 tablet (500 mg total) by mouth 2 (two) times daily., Disp: 14 tablet, Rfl: 0    nitroGLYCERIN (NITROSTAT) 0.3 MG SL tablet, Place 1 tablet (0.3 mg total) under the tongue every 5 (five) minutes as needed for Chest pain. No more than 3 tablets in one day., Disp: 30 tablet, Rfl: 1    NOVOLOG FLEXPEN 100 unit/mL InPn pen, ADMINISTER 20 UNITS UNDER THE SKIN THREE TIMES DAILY WITH MEALS, Disp: 15 mL, Rfl: 0    pantoprazole (PROTONIX) 40 MG tablet, Take 1 tablet (40 mg total) by mouth once daily., Disp: 30 tablet, Rfl: 11    pen needle, diabetic (BD INSULIN PEN NEEDLE UF SHORT) 31 gauge x 5/16" Ndle, Inject 1 each into the skin 3 (three) times daily., Disp: 90 each, Rfl: 11    tiotropium (SPIRIVA WITH HANDIHALER) 18 mcg inhalation capsule, Inhale 1 capsule (18 mcg total) into the lungs once daily. Controller, Disp: 90 capsule, Rfl: 3    trazodone (DESYREL) 150 MG tablet, , Disp: , Rfl:     TRUE METRIX AIR GLUCOSE METER kit, TEST FOUR TIMES DAILY BEFORE MEALS  AND EVERY NIGHT, Disp: 1 each, Rfl: 0    VENTOLIN HFA 90 mcg/actuation inhaler, , Disp: , Rfl:     STANDARDS OF CARE:  Eye doctor: Dr. Dias, last exam 12/06/2017.  Dental exam: Recommend regular exams; denies gums bleeding.  Podiatry doctor:  ACE/ARB: Yes  Statin: Yes     ACTIVITY LEVEL: He exercises rarely.  BLOOD GLUCOSE TESTING: Self-monitoring with   SOCIAL HISTORY: . Lives with spouse. retired no tobacco use       Health Maintenance   Topic Date Due    TETANUS VACCINE  06/20/1975    Zoster Vaccine  06/20/2017    Hemoglobin A1c  07/03/2018    Lipid Panel  07/07/2018    Eye Exam  12/06/2018    Foot Exam  02/19/2019    Pneumococcal PPSV23 (Medium " Risk) (2) 06/20/2022    Colonoscopy  10/22/2023    Hepatitis C Screening  Completed    Influenza Vaccine  Completed         Diabetes Management Status    Statin: Not taking  ACE/ARB: Taking    Screening or Prevention Patient's value Goal Complete/Controlled?   HgA1C Testing and Control   Lab Results   Component Value Date    HGBA1C 10.3 (H) 01/03/2018      Annually/Less than 8% No   Lipid profile : 07/07/2017 Annually Yes   LDL control Lab Results   Component Value Date    LDLCALC 112.4 07/07/2017    Annually/Less than 100 mg/dl  No   Nephropathy screening Lab Results   Component Value Date    LABMICR 800.0 07/20/2017     Lab Results   Component Value Date    PROTEINUA Negative 03/22/2017    Annually Yes   Blood pressure BP Readings from Last 1 Encounters:   02/12/18 118/70    Less than 140/90 Yes   Dilated retinal exam : 12/06/2017 Annually Yes   Foot exam   : 02/19/2018 Annually Yes       The following results were reviewed with patient.    Lab Results   Component Value Date    WBC 8.39 01/08/2018    HGB 14.3 01/08/2018    HCT 43.8 01/08/2018     01/08/2018    CHOL 171 07/07/2017    TRIG 83 07/07/2017    HDL 42 07/07/2017    ALT 45 (H) 01/08/2018    AST 43 (H) 01/08/2018     01/08/2018    K 3.8 01/08/2018     01/08/2018    CREATININE 1.1 01/08/2018    ESTGFRAFRICA >60 01/08/2018    EGFRNONAA >60 01/08/2018    BUN 11 01/08/2018    CO2 28 01/08/2018    TSH 2.374 07/20/2017    PSA 0.83 01/18/2013    INR 1.0 03/15/2017     (H) 01/08/2018       Lab Results   Component Value Date    HGBA1C 10.3 (H) 01/03/2018    HGBA1C 9.5 (H) 09/28/2017    HGBA1C 9.6 (H) 07/20/2017       Lab Results   Component Value Date    GLUTAMICACID 0.01 07/20/2017    CPEPTIDE 4.37 07/20/2017     Lab Results   Component Value Date    TSH 2.374 07/20/2017     Lab Results   Component Value Date    TOTALTESTOST 182 (L) 01/18/2013     Lab Results   Component Value Date    TESTOSTERONE 213 (L) 12/04/2014    TESTOSTERONE  35.9 (L) 12/04/2014     Lab Results   Component Value Date    FERRITIN 505 (H) 01/12/2017     Lab Results   Component Value Date    PTH 95.0 (H) 12/04/2014    CALCIUM 9.5 01/08/2018    PHOS 2.6 (L) 11/16/2016     Review of patient's allergies indicates:  No Known Allergies    Past Medical History:   Diagnosis Date    Arthritis     Asthma     Depression     Diabetes mellitus     Diabetes mellitus, type 2 Diagnosed in 2000    Elevated PSA     Hypertension        Review of Systems   Constitutional: Negative.  Negative for activity change, appetite change, chills, diaphoresis, fatigue, fever and unexpected weight change.   HENT: Negative.  Negative for dental problem, facial swelling, hearing loss, nosebleeds, trouble swallowing and voice change.    Eyes: Negative.  Negative for photophobia, pain, discharge, redness, itching and visual disturbance.   Respiratory: Negative.  Negative for cough, choking, chest tightness, shortness of breath and wheezing.    Cardiovascular: Negative.  Negative for chest pain, palpitations and leg swelling.   Gastrointestinal: Negative.  Negative for abdominal distention, abdominal pain, blood in stool, constipation, diarrhea, nausea and vomiting.   Endocrine: Negative.  Negative for cold intolerance, heat intolerance, polydipsia, polyphagia and polyuria.   Genitourinary: Negative.  Negative for decreased urine volume, difficulty urinating, dysuria, frequency, scrotal swelling, testicular pain and urgency.   Musculoskeletal: Negative.  Negative for arthralgias, back pain, gait problem, joint swelling, myalgias, neck pain and neck stiffness.   Skin: Negative.  Negative for color change, pallor, rash and wound.   Allergic/Immunologic: Negative.  Negative for environmental allergies, food allergies and immunocompromised state.   Neurological: Negative.  Negative for dizziness, tremors, seizures, syncope, facial asymmetry, speech difficulty, weakness, light-headedness, numbness and  "headaches.   Hematological: Negative.  Negative for adenopathy. Does not bruise/bleed easily.   Psychiatric/Behavioral: Negative.  Negative for agitation, behavioral problems, confusion, decreased concentration, dysphoric mood, hallucinations, self-injury, sleep disturbance and suicidal ideas. The patient is not nervous/anxious and is not hyperactive.       Objective:     Vitals - 1 value per visit 2/10/2018 2/12/2018 2/27/2018   SYSTOLIC 141 118 136   DIASTOLIC 86 70 84   PULSE 90 71 -   TEMPERATURE 98 98.5 -   RESPIRATIONS 20 - -   SPO2 96 98 -   Weight (lb) 210 216 209.88   Weight (kg) 95.255 97.977 95.2   HEIGHT 5' 5" 5' 5" 5' 5"   BODY MASS INDEX 34.95 35.94 34.93   VISIT REPORT - - -   Pain Score  - 10 9   Some recent data might be hidden     Physical Exam   Constitutional: He is oriented to person, place, and time. He appears well-developed and well-nourished. He is cooperative.  Non-toxic appearance. He does not have a sickly appearance. He does not appear ill. No distress. He is not intubated.   HENT:   Head: Normocephalic and atraumatic. Not macrocephalic and not microcephalic. Head is without raccoon's eyes, without Summers's sign, without abrasion, without contusion, without laceration, without right periorbital erythema and without left periorbital erythema. Hair is normal.   Right Ear: External ear normal. No lacerations. No drainage, swelling or tenderness. No foreign bodies. No mastoid tenderness. Tympanic membrane is not injected, not scarred, not perforated, not erythematous, not retracted and not bulging. Tympanic membrane mobility is normal. No middle ear effusion. No hemotympanum. No decreased hearing is noted.   Left Ear: External ear normal. No lacerations. No drainage, swelling or tenderness. No foreign bodies. No mastoid tenderness. Tympanic membrane is not injected, not scarred, not perforated, not erythematous, not retracted and not bulging. Tympanic membrane mobility is normal.  No middle " ear effusion. No hemotympanum. No decreased hearing is noted.   Nose: Nose normal.   Mouth/Throat: Oropharynx is clear and moist.   Eyes: EOM and lids are normal. Pupils are equal, round, and reactive to light. Right eye exhibits no chemosis, no discharge, no exudate and no hordeolum. No foreign body present in the right eye. Left eye exhibits no chemosis, no discharge, no exudate and no hordeolum. No foreign body present in the left eye. Right conjunctiva is not injected. Right conjunctiva has no hemorrhage. Left conjunctiva is not injected. Left conjunctiva has no hemorrhage. No scleral icterus. Right eye exhibits normal extraocular motion and no nystagmus. Left eye exhibits normal extraocular motion and no nystagmus. Right pupil is round and reactive. Left pupil is round and reactive. Pupils are equal.   Neck: Normal range of motion, full passive range of motion without pain and phonation normal. Neck supple. Normal carotid pulses, no hepatojugular reflux and no JVD present. No tracheal tenderness, no spinous process tenderness and no muscular tenderness present. Carotid bruit is not present. No neck rigidity. No tracheal deviation, no edema, no erythema and normal range of motion present. No thyroid mass and no thyromegaly present.   Cardiovascular: Normal rate, regular rhythm, normal heart sounds and intact distal pulses.   No extrasystoles are present. PMI is not displaced.  Exam reveals no gallop, no friction rub and no decreased pulses.    No murmur heard.  Pulses:       Carotid pulses are 2+ on the right side, and 2+ on the left side.       Radial pulses are 2+ on the right side, and 2+ on the left side.        Dorsalis pedis pulses are 2+ on the right side, and 2+ on the left side.        Posterior tibial pulses are 2+ on the right side, and 2+ on the left side.   Pulmonary/Chest: Effort normal and breath sounds normal. No accessory muscle usage or stridor. No apnea, no tachypnea and no bradypnea. He is  not intubated. No respiratory distress. He has no decreased breath sounds. He has no wheezes. He has no rhonchi. He has no rales. He exhibits no tenderness.   Abdominal: Soft. Bowel sounds are normal. He exhibits no shifting dullness, no distension, no pulsatile liver, no fluid wave, no abdominal bruit, no ascites, no pulsatile midline mass and no mass. There is no hepatosplenomegaly, splenomegaly or hepatomegaly. There is no tenderness. There is no rigidity, no rebound, no guarding, no CVA tenderness and no tenderness at McBurney's point. No hernia. Hernia confirmed negative in the ventral area.   Musculoskeletal: Normal range of motion. He exhibits no edema or tenderness.        Right foot: There is normal range of motion and no deformity.        Left foot: There is normal range of motion and no deformity.   Feet:   Right Foot:   Protective Sensation: 6 sites tested. 6 sites sensed.   Skin Integrity: Negative for ulcer, blister, skin breakdown, erythema, warmth, callus or dry skin.   Left Foot:   Protective Sensation: 6 sites tested. 6 sites sensed.   Skin Integrity: Negative for ulcer, blister, skin breakdown, erythema, warmth, callus or dry skin.   Lymphadenopathy:        Head (right side): No submental, no submandibular, no tonsillar, no preauricular, no posterior auricular and no occipital adenopathy present.        Head (left side): No submental, no submandibular, no tonsillar, no preauricular, no posterior auricular and no occipital adenopathy present.     He has no cervical adenopathy.        Right cervical: No superficial cervical, no deep cervical and no posterior cervical adenopathy present.       Left cervical: No superficial cervical, no deep cervical and no posterior cervical adenopathy present.   Neurological: He is alert and oriented to person, place, and time. He has normal reflexes. He displays no atrophy and no tremor. No cranial nerve deficit or sensory deficit. He exhibits normal muscle tone.  He displays no seizure activity. Coordination and gait normal.   Reflex Scores:       Bicep reflexes are 2+ on the right side and 2+ on the left side.       Brachioradialis reflexes are 2+ on the right side and 2+ on the left side.       Patellar reflexes are 2+ on the right side and 2+ on the left side.  Skin: Skin is warm and dry. No rash noted. No erythema. No pallor.   Psychiatric: He has a normal mood and affect. His mood appears not anxious. His affect is not angry, not blunt, not labile and not inappropriate. His speech is not rapid and/or pressured, not delayed, not tangential and not slurred. He is not agitated, not aggressive, not hyperactive, not slowed, not withdrawn, not actively hallucinating and not combative. Thought content is not paranoid and not delusional. Cognition and memory are not impaired. He does not express impulsivity or inappropriate judgment. He does not exhibit a depressed mood. He expresses no homicidal and no suicidal ideation. He expresses no suicidal plans and no homicidal plans. He is communicative. He exhibits normal recent memory and normal remote memory. He is attentive.     Assessment:     1. 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 Hyperparathyroidism    3. Disorder of parathyroid gland       We have discussed the etiology and treatment options associated with the diagnosis as well as alternatives. He has elected the following treatments.     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, with long-term current use of insulin  -     POCT glucose  -     Hemoglobin  A1c; Future; Expected date: 02/27/2018    Hyperparathyroidism  -     PTH, intact; Future; Expected date: 02/27/2018  -     Calcium, ionized; Future; Expected date: 02/27/2018  -     Vitamin D; Future; Expected date: 02/27/2018  -     Calcium, Timed Urine [facility]; 24 Hours; Future; Expected date: 02/27/2018  -     DXA Bone Density Spine And Hip; Future; Expected date: 02/27/2018    Disorder of parathyroid gland   -     Vitamin D; Future; Expected date: 02/27/2018    1.) Patient was instructed to monitor blood glucose twice daily, fasting, and 2 hour post meal; if on Multiple Daily Injections (MDI) he will need to have pre-meal blood glucose as well. Reminded to bring BG meter or record to each visit for review.  2.) Reviewed pathophysiology of diabetes, complications related to the disease, importance of annual dilated eye exam and self daily foot examination.  3.) Continue medications as prescribed Novolog; Metformin; Amaryl; and MDI with Lantus and Novolog. [facility] MyChart or Phone review in 1 week with [facility] records for adjustment of medication.  4.) Advised patient to continue to follow up with Dietician/CDE as directed.  5.) Discussed activity, benefits, methods, and precautions. Recommended patient start/continue some form of exercise and increase as tolerated to 60 minutes per day to facilitate weight loss and aid in control of BGs. Also reminded patient of WHO recommendation of 10,000 steps daily as a goal.   6.) A1C, TSH, Lipid Panel, CMP/renal panel with eGFR and Micro/Creatinine prior to next visit, if not already done.  7.) Return to clinic in 3 weeks for follow up. Advised patient to call clinic with any questions or concerns.    A total of 30 minutes was spent in face to face time, of which 50 % was spent in counseling patient on disease process, complications, treatment, and side effects of medications.    The patient was explained the above plan and given opportunity to ask questions.  He understands,  chooses and consents to this plan and accepts all the risks, which include but are not limited to the risks mentioned above.   He understands the alternative of having no testing, interventions or treatments at this time. He left content and without further questions.

## 2018-02-27 NOTE — PROGRESS NOTES
Subjective:       Patient ID: Crow Green is a 62 y.o. male.    Chief Complaint: Hip Pain (left hip radiates down leg)    HPI  Here today to follow up on sciatica. Has had symptoms for more than 6 weeks now and is having to use a walker to get around.   Naproxen helped but only temporarily.  Has been taking tylenol with tizanidine which also only helps temporarily. Taking more than 4000g of tylenol in a day and also took norco which helped temporarily.  Pain is worse at night. No incontinence.   Has not yet started PT  Feels tingling and numbness in the left foot    Was put on vitamin D by endocrine and recommended to get a DEXA scan.    Family History   Problem Relation Age of Onset    Glaucoma Mother     Cataracts Mother     Cataracts Father     Glaucoma Sister     Cataracts Sister     Glaucoma Maternal Aunt     Prostate cancer Neg Hx        Current Outpatient Prescriptions:     ALCOHOL ANTISEPTIC PADS (ALCOHOL PREP PADS TOP), , Disp: , Rfl:     amiodarone (PACERONE) 200 MG Tab, Take 1 tablet (200 mg total) by mouth once daily., Disp: 30 tablet, Rfl: 11    amlodipine (NORVASC) 10 MG tablet, Take 1 tablet (10 mg total) by mouth once daily., Disp: 90 tablet, Rfl: 3    ammonium lactate 12 % Crea, Apply 1 Act topically 2 (two) times daily. (Patient taking differently: Apply 1 Act topically once daily. ), Disp: 1 Tube, Rfl: 3    apixaban 5 mg Tab, Take 1 tablet (5 mg total) by mouth 2 (two) times daily., Disp: 60 tablet, Rfl: 1    aspirin (ECOTRIN) 81 MG EC tablet, Take 1 tablet (81 mg total) by mouth once daily., Disp: , Rfl: 0    atorvastatin (LIPITOR) 40 MG tablet, Take 1 tablet (40 mg total) by mouth every evening., Disp: 90 tablet, Rfl: 3    doxepin (SINEQUAN) 150 MG Cap, , Disp: , Rfl:     duloxetine (CYMBALTA) 30 MG capsule, , Disp: , Rfl:     exenatide microspheres (BYDUREON BCISE) 2 mg/0.85 mL AtIn, Inject 2 mg into the skin every 7 days., Disp: 4 Syringe, Rfl: 11     "fluticasone-salmeterol 250-50 mcg/dose (ADVAIR DISKUS) 250-50 mcg/dose diskus inhaler, Inhale 1 puff into the lungs 2 (two) times daily. Controller, Disp: 60 each, Rfl: 11    gabapentin (NEURONTIN) 300 MG capsule, Take 1 capsule (300 mg total) by mouth 3 (three) times daily., Disp: 270 capsule, Rfl: 3    glimepiride (AMARYL) 2 MG tablet, TAKE 1 TABLET BY MOUTH DAILY BEFORE BREAKFAST, Disp: 90 tablet, Rfl: 0    insulin glargine (LANTUS SOLOSTAR) 100 unit/mL (3 mL) InPn pen, Inject 50 Units into the skin every evening. (Patient taking differently: Inject 60 Units into the skin every evening. ), Disp: 2 Box, Rfl: 11    insulin syringe-needle U-100 1 mL 31 gauge x 5/16 Syrg, , Disp: , Rfl:     latanoprost 0.005 % ophthalmic solution, Place 1 drop into both eyes every evening., Disp: 1 Bottle, Rfl: 4    lisinopril (PRINIVIL,ZESTRIL) 5 MG tablet, TAKE 1 TABLET (5 MG TOTAL) BY MOUTH ONCE DAILY., Disp: 90 tablet, Rfl: 2    metformin (GLUCOPHAGE) 1000 MG tablet, Take 1 tablet (1,000 mg total) by mouth 2 (two) times daily with meals., Disp: 180 tablet, Rfl: 3    metoprolol succinate (TOPROL-XL) 50 MG 24 hr tablet, TAKE 1 TABLET EVERY DAY, Disp: 90 tablet, Rfl: 5    naproxen (NAPROSYN) 500 MG tablet, Take 1 tablet (500 mg total) by mouth 2 (two) times daily with meals. To help with pain, Disp: 14 tablet, Rfl: 0    nitroGLYCERIN (NITROSTAT) 0.3 MG SL tablet, Place 1 tablet (0.3 mg total) under the tongue every 5 (five) minutes as needed for Chest pain. No more than 3 tablets in one day., Disp: 30 tablet, Rfl: 1    NOVOLOG FLEXPEN 100 unit/mL InPn pen, ADMINISTER 20 UNITS UNDER THE SKIN THREE TIMES DAILY WITH MEALS, Disp: 15 mL, Rfl: 0    pantoprazole (PROTONIX) 40 MG tablet, Take 1 tablet (40 mg total) by mouth once daily., Disp: 30 tablet, Rfl: 11    pen needle, diabetic (BD INSULIN PEN NEEDLE UF SHORT) 31 gauge x 5/16" Ndle, Inject 1 each into the skin 3 (three) times daily., Disp: 90 each, Rfl: 11    trazodone " "(DESYREL) 150 MG tablet, , Disp: , Rfl:     TRUE METRIX AIR GLUCOSE METER kit, TEST FOUR TIMES DAILY BEFORE MEALS  AND EVERY NIGHT, Disp: 1 each, Rfl: 0    VENTOLIN HFA 90 mcg/actuation inhaler, , Disp: , Rfl:     hydrocodone-acetaminophen 10-325mg (NORCO)  mg Tab, Take 1 tablet by mouth every 12 (twelve) hours as needed for Pain (severe pain only)., Disp: 14 tablet, Rfl: 0    tiotropium (SPIRIVA WITH HANDIHALER) 18 mcg inhalation capsule, Inhale 1 capsule (18 mcg total) into the lungs once daily. Controller, Disp: 90 capsule, Rfl: 3    Review of Systems   Constitutional: Negative for chills and fever.   Eyes: Negative for visual disturbance.   Respiratory: Negative for cough and shortness of breath.    Cardiovascular: Negative for chest pain.   Gastrointestinal: Negative for abdominal pain.   Musculoskeletal: Positive for back pain and gait problem.   Neurological: Negative for dizziness.       Objective:   BP (!) 164/70 (BP Location: Right arm, Patient Position: Sitting, BP Method: X-Large (Manual))   Pulse (!) 115   Temp 96.3 °F (35.7 °C) (Tympanic)   Resp 18   Ht 5' 5" (1.651 m)   Wt 96.1 kg (211 lb 13.8 oz)   SpO2 98%   BMI 35.26 kg/m²      Physical Exam   Constitutional: He is oriented to person, place, and time. He appears well-developed and well-nourished. No distress.   HENT:   Head: Normocephalic and atraumatic.   Nose: Nose normal.   Eyes: Conjunctivae and EOM are normal. Pupils are equal, round, and reactive to light. Right eye exhibits no discharge. Left eye exhibits no discharge.   Neck: No thyromegaly present.   Cardiovascular: Normal rate, regular rhythm and normal heart sounds.    No murmur heard.  Pulmonary/Chest: Effort normal and breath sounds normal. No respiratory distress. He has no wheezes.   Abdominal: Soft. He exhibits no distension.   Musculoskeletal: He exhibits no edema.   Patient had a positive straight leg raise test on the left side.  Was in obvious discomfort " although he was walking with only a slight antalgic gait after evaluation.   Neurological: He is alert and oriented to person, place, and time.   Skin: Skin is warm. No rash noted. He is not diaphoretic.   Psychiatric: He has a normal mood and affect. His behavior is normal.   Vitals reviewed.      Assessment & Plan     Problem List Items Addressed This Visit        Orthopedic    Left sided sciatica - Primary    Current Assessment & Plan     Symptoms not improving with conservative treatment. Referring for MRI, physiatry, encouraging to go to PT and limited use of norco along with BID naproxen for pain relief.          Relevant Orders    MRI Lumbar Spine Without Contrast    Ambulatory Referral to Physiatry            No Follow-up on file.

## 2018-02-27 NOTE — ASSESSMENT & PLAN NOTE
Symptoms not improving with conservative treatment. Referring for MRI, physiatry, encouraging to go to PT and limited use of norco along with BID naproxen for pain relief.

## 2018-02-27 NOTE — MEDICAL/APP STUDENT
"Subjective:       Patient ID: Crow Green is a 62 y.o. male.    Chief Complaint: Hip Pain (left hip radiates down leg)    Pt presents to clinic for follow up for left hip and leg pain that began over a month ago with tingling and numbness to the bottom of his left foot. Pt reports that it hurts constantly and it is only relieved by taking naproxen which lasts about 4 hours. Also relived by tizanidine and acetaminophen which lasts about 6 hours. No longer has any tizanidine or naproxen. States the Norco worked the best and for the longest period of time but he only had a few of those. Has been using a walked to ambulate since the pain started due to his "leg giving out sometimes." Pt followed by endocrinology, started on vitamin D today. Will have DEXA scan tomorrow. Pt leaving to go out of town in April and would like to feel better before leaving.       Hip Pain    The incident occurred at home. There was no injury mechanism. The pain is present in the left leg and left hip. The quality of the pain is described as shooting. The pain is severe. The pain has been constant since onset. Associated symptoms include an inability to bear weight, numbness and tingling. He reports no foreign bodies present. Nothing aggravates the symptoms. He has tried acetaminophen, rest and elevation (naproxen and tizanidine) for the symptoms. The treatment provided moderate relief.     Review of Systems   Neurological: Positive for tingling and numbness.       Objective:      Physical Exam   Constitutional: He is oriented to person, place, and time. He appears well-developed and well-nourished.   HENT:   Right Ear: External ear normal.   Left Ear: External ear normal.   Nose: Nose normal.   Mouth/Throat: Oropharynx is clear and moist.   Eyes: Conjunctivae and EOM are normal.   Neck: Normal range of motion.   Cardiovascular: Normal rate, regular rhythm and normal heart sounds.    Pulmonary/Chest: Effort normal and breath sounds " normal.   Musculoskeletal:        Lumbar back: He exhibits pain.   Left hip and leg: He exhibits pain.   Neurological: He is alert and oriented to person, place, and time.   Skin: Skin is warm and dry.   Psychiatric: He has a normal mood and affect. His behavior is normal. Thought content normal.       Assessment:       No diagnosis found.    Plan:       MRI ordered, referred to physiatry, and discussed rescheduling appointment with PT. Suggested Naproxen BID. Explained the importance of not taking more than 8 tablets of acetaminophen in a 24 hour period and using Norco only when pain is severe.     Call clinic with concerns.

## 2018-02-28 ENCOUNTER — APPOINTMENT (OUTPATIENT)
Dept: RADIOLOGY | Facility: CLINIC | Age: 63
End: 2018-02-28
Attending: PHYSICIAN ASSISTANT
Payer: MEDICARE

## 2018-02-28 DIAGNOSIS — E21.3 HYPERPARATHYROIDISM: ICD-10-CM

## 2018-02-28 PROCEDURE — 77080 DXA BONE DENSITY AXIAL: CPT | Mod: 26,,, | Performed by: RADIOLOGY

## 2018-02-28 PROCEDURE — 77080 DXA BONE DENSITY AXIAL: CPT | Mod: TC,PO

## 2018-03-02 ENCOUNTER — PROCEDURE VISIT (OUTPATIENT)
Dept: PULMONOLOGY | Facility: CLINIC | Age: 63
End: 2018-03-02
Payer: MEDICARE

## 2018-03-02 ENCOUNTER — TELEPHONE (OUTPATIENT)
Dept: PULMONOLOGY | Facility: CLINIC | Age: 63
End: 2018-03-02

## 2018-03-02 ENCOUNTER — OFFICE VISIT (OUTPATIENT)
Dept: PULMONOLOGY | Facility: CLINIC | Age: 63
End: 2018-03-02
Payer: MEDICARE

## 2018-03-02 ENCOUNTER — LAB VISIT (OUTPATIENT)
Dept: LAB | Facility: HOSPITAL | Age: 63
End: 2018-03-02
Attending: INTERNAL MEDICINE
Payer: MEDICARE

## 2018-03-02 VITALS
RESPIRATION RATE: 20 BRPM | SYSTOLIC BLOOD PRESSURE: 126 MMHG | DIASTOLIC BLOOD PRESSURE: 80 MMHG | BODY MASS INDEX: 35.01 KG/M2 | HEIGHT: 65 IN | OXYGEN SATURATION: 96 % | WEIGHT: 210.13 LBS | HEART RATE: 78 BPM

## 2018-03-02 DIAGNOSIS — J45.40 MODERATE PERSISTENT ASTHMA WITHOUT COMPLICATION: ICD-10-CM

## 2018-03-02 DIAGNOSIS — J45.20 MILD INTERMITTENT ASTHMA WITHOUT COMPLICATION: ICD-10-CM

## 2018-03-02 DIAGNOSIS — E66.9 OBESITY (BMI 30.0-34.9): ICD-10-CM

## 2018-03-02 DIAGNOSIS — R70.0 ELEVATED ERYTHROCYTE SEDIMENTATION RATE: Chronic | ICD-10-CM

## 2018-03-02 DIAGNOSIS — R51.9 HEADACHE, UNSPECIFIED HEADACHE TYPE: ICD-10-CM

## 2018-03-02 DIAGNOSIS — G47.33 OSA ON CPAP: Primary | ICD-10-CM

## 2018-03-02 LAB
ALBUMIN SERPL BCP-MCNC: 3.7 G/DL
ALP SERPL-CCNC: 93 U/L
ALT SERPL W/O P-5'-P-CCNC: 66 U/L
ANION GAP SERPL CALC-SCNC: 12 MMOL/L
AST SERPL-CCNC: 69 U/L
BASOPHILS # BLD AUTO: 0.02 K/UL
BASOPHILS NFR BLD: 0.2 %
BILIRUB SERPL-MCNC: 0.3 MG/DL
BUN SERPL-MCNC: 14 MG/DL
CALCIUM SERPL-MCNC: 9.7 MG/DL
CHLORIDE SERPL-SCNC: 103 MMOL/L
CO2 SERPL-SCNC: 27 MMOL/L
CREAT SERPL-MCNC: 0.9 MG/DL
CRP SERPL-MCNC: 7.9 MG/L
DIFFERENTIAL METHOD: NORMAL
EOSINOPHIL # BLD AUTO: 0.1 K/UL
EOSINOPHIL NFR BLD: 0.6 %
ERYTHROCYTE [DISTWIDTH] IN BLOOD BY AUTOMATED COUNT: 13.5 %
ERYTHROCYTE [SEDIMENTATION RATE] IN BLOOD BY WESTERGREN METHOD: 29 MM/HR
EST. GFR  (AFRICAN AMERICAN): >60 ML/MIN/1.73 M^2
EST. GFR  (NON AFRICAN AMERICAN): >60 ML/MIN/1.73 M^2
GLUCOSE SERPL-MCNC: 152 MG/DL
HCT VFR BLD AUTO: 44.8 %
HGB BLD-MCNC: 14.4 G/DL
LYMPHOCYTES # BLD AUTO: 3.9 K/UL
LYMPHOCYTES NFR BLD: 32.2 %
MCH RBC QN AUTO: 28.9 PG
MCHC RBC AUTO-ENTMCNC: 32.1 G/DL
MCV RBC AUTO: 90 FL
MONOCYTES # BLD AUTO: 0.9 K/UL
MONOCYTES NFR BLD: 7.1 %
NEUTROPHILS # BLD AUTO: 7.3 K/UL
NEUTROPHILS NFR BLD: 60.1 %
PLATELET # BLD AUTO: 186 K/UL
PMV BLD AUTO: 10.3 FL
POST FEF 25 75: 2.49 L/S (ref 1.68–3.22)
POST FET 100: 8.2 S
POST FEV1 FVC: 85 %
POST FEV1: 1.65 L (ref 2.19–2.91)
POST FIF 50: 1.26 L/S
POST FVC: 1.94 L (ref 2.88–3.69)
POST PEF: 4.49 L/S (ref 6.08–8.36)
POTASSIUM SERPL-SCNC: 4.2 MMOL/L
PRE FEF 25 75: 2.37 L/S (ref 1.68–3.22)
PRE FET 100: 8.62 S
PRE FEV1 FVC: 84 %
PRE FEV1: 1.71 L (ref 2.19–2.91)
PRE FIF 50: 1.6 L/S
PRE FVC: 2.03 L (ref 2.88–3.69)
PRE PEF: 5.84 L/S (ref 6.08–8.36)
PREDICTED FEV1 FVC: 78.27 % (ref 73.06–83.48)
PREDICTED FEV1: 2.55 L (ref 2.19–2.91)
PREDICTED FVC: 3.29 L (ref 2.88–3.69)
PROT SERPL-MCNC: 8.2 G/DL
PROVOCATION PROTOCOL: ABNORMAL
RBC # BLD AUTO: 4.99 M/UL
SODIUM SERPL-SCNC: 142 MMOL/L
WBC # BLD AUTO: 12.22 K/UL

## 2018-03-02 PROCEDURE — 3074F SYST BP LT 130 MM HG: CPT | Mod: S$GLB,,, | Performed by: NURSE PRACTITIONER

## 2018-03-02 PROCEDURE — 3079F DIAST BP 80-89 MM HG: CPT | Mod: S$GLB,,, | Performed by: NURSE PRACTITIONER

## 2018-03-02 PROCEDURE — 99214 OFFICE O/P EST MOD 30 MIN: CPT | Mod: 25,S$GLB,, | Performed by: NURSE PRACTITIONER

## 2018-03-02 PROCEDURE — 99999 PR PBB SHADOW E&M-EST. PATIENT-LVL III: CPT | Mod: PBBFAC,,, | Performed by: NURSE PRACTITIONER

## 2018-03-02 PROCEDURE — 36415 COLL VENOUS BLD VENIPUNCTURE: CPT | Mod: PO

## 2018-03-02 PROCEDURE — 85651 RBC SED RATE NONAUTOMATED: CPT | Mod: PO

## 2018-03-02 PROCEDURE — 80053 COMPREHEN METABOLIC PANEL: CPT | Mod: PO

## 2018-03-02 PROCEDURE — 94060 EVALUATION OF WHEEZING: CPT | Mod: S$GLB,,, | Performed by: INTERNAL MEDICINE

## 2018-03-02 PROCEDURE — 85025 COMPLETE CBC W/AUTO DIFF WBC: CPT | Mod: PO

## 2018-03-02 PROCEDURE — 86140 C-REACTIVE PROTEIN: CPT

## 2018-03-02 NOTE — PROGRESS NOTES
Subjective:      Patient ID: Crow Green is a 62 y.o. male.    Chief Complaint: Sleep Apnea    HPI: Crow Green is here for follow up for SANDRITA and CPAP complaince assessment. He is on Auto CPAP of 6-14 cmH2O pressure.  He remains not compliant with CPAP use. Complaince download today reveals 0.0% of days with greater than 4 hours of device use.   Patient reports some benefit from CPAP use and sleeps better, he lacks motivation to use nightly and keep on with up to bathroom does not always go back to sleep, he watches a lot of TV at night  And sleeps off and on during the day. Encouraged CPAP with naps during the day.   Patient reports no complaints with mask, he has new mask that fits well.     Asthma:  Patient is here for evaluation of asthma.   He reports he feels controlled with current medication regimen.  He denies wheezing, cough or sustained shortness of breath. He has some mild shortness of breath with prolonged exertion, he walks every evening. Uses albuterol inhaler if needed before exercise. Has used rescue twice since  Began walking program over past 2 weeks.   Current limitations in activity from asthma: some limitation. He cleans house with pacing, no longer cuts his own lawn.   Does he use a spacer with MDIs? No, provided spacer with instruction today for use with rescue.  Current medication: advair 100 mcg, spriva, albuterol neb and inhaler.   ACT score: 20, scores 19 or above indicate well controlled asthma.     Previous Report Reviewed: lab reports and office notes     Past Medical History: The following portions of the patient's history were reviewed and updated as appropriate:   He  has a past surgical history that includes Cholecystectomy.  His family history includes Cataracts in his father, mother, and sister; Glaucoma in his maternal aunt, mother, and sister.  He  reports that he has never smoked. He has never used smokeless tobacco. He reports that he does not drink alcohol  or use drugs.  He has a current medication list which includes the following prescription(s): amiodarone, amlodipine, ammonium lactate, apixaban, duloxetine, exenatide microspheres, fluticasone-salmeterol 250-50 mcg/dose, gabapentin, glimepiride, hydrocodone-acetaminophen 10-325mg, insulin glargine, insulin syringe-needle u-100, lisinopril, metformin, metoprolol succinate, naproxen, novolog flexpen u-100 insulin, pantoprazole, pen needle, diabetic, trazodone, true metrix air glucose meter, ventolin hfa, alcohol antiseptic pads, aspirin, atorvastatin, doxepin, latanoprost, nitroglycerin, and tiotropium.  He has No Known Allergies..    The following portions of the patient's history were reviewed and updated as appropriate: allergies, current medications, past family history, past medical history, past social history, past surgical history and problem list.    Review of Systems   Constitutional: Negative for fever, chills, weight loss, weight gain, activity change, appetite change, fatigue and night sweats.   HENT: Negative for postnasal drip, rhinorrhea, sinus pressure, voice change and congestion.    Eyes: Negative for redness and itching.   Respiratory: Negative for snoring, cough, sputum production, chest tightness, shortness of breath, wheezing, orthopnea, asthma nighttime symptoms, dyspnea on extertion, use of rescue inhaler and somnolence.    Cardiovascular: Negative.  Negative for chest pain, palpitations and leg swelling.   Genitourinary: Negative for difficulty urinating and hematuria.   Endocrine: Negative for cold intolerance and heat intolerance.    Musculoskeletal: Negative for arthralgias, gait problem, joint swelling and myalgias.   Skin: Negative.    Gastrointestinal: Negative for nausea, vomiting, abdominal pain and acid reflux.   Neurological: Negative for dizziness, weakness, light-headedness and headaches.   Hematological: Negative for adenopathy. No excessive bruising.   All other systems  "reviewed and are negative.     Objective:   /80 (BP Location: Right arm, Patient Position: Sitting)   Pulse 78   Resp 20   Ht 5' 5" (1.651 m)   Wt 95.3 kg (210 lb 1.6 oz)   SpO2 96%   BMI 34.96 kg/m²   Physical Exam   Constitutional: He is oriented to person, place, and time. Vital signs are normal. He appears well-developed and well-nourished. He is active and cooperative.  Non-toxic appearance. He does not have a sickly appearance. He does not appear ill. No distress.   HENT:   Head: Normocephalic and atraumatic.   Right Ear: External ear normal.   Left Ear: External ear normal.   Nose: Nose normal.   Mouth/Throat: Oropharynx is clear and moist. No oropharyngeal exudate.   Eyes: Conjunctivae are normal.   Neck: Normal range of motion. Neck supple.   Cardiovascular: Normal rate, regular rhythm, normal heart sounds and intact distal pulses.    Pulmonary/Chest: Effort normal and breath sounds normal.   Abdominal: Soft.   Musculoskeletal: He exhibits no edema.   Neurological: He is alert and oriented to person, place, and time. He has normal reflexes.   Skin: Skin is warm and dry.   Psychiatric: He has a normal mood and affect. His behavior is normal. Judgment and thought content normal.   Vitals reviewed.    Personal Diagnostic Review    CPAP download  Auto CPAP 6-14 cm  Compliance Summary  1/23/2018 - 2/21/2018 (30 days)  Days with Device Usage 11 days  Days without Device Usage 19 days  Percent Days with Device Usage 36.7%  Cumulative Usage 2 hrs. 51 mins. 9 secs.  Maximum Usage (1 Day) 54 mins. 30 secs.  Average Usage (All Days) 5 mins. 42 secs.  Average Usage (Days Used) 15 mins. 33 secs.  Minimum Usage (1 Day) 27 secs.  Percent of Days with Usage >= 4 Hours 0.0%  Percent of Days with Usage < 4 Hours 100.0%  Date Range  Total Blower Time 5 hrs. 44 mins. 12 secs.  Average AHI 8.2  Auto CPAP Summary  Auto CPAP Mean Pressure 7.5 cmH2O  Auto CPAP Peak Average Pressure 9.9 cmH2O  Average Device Pressure <= " 90% of Time 8.2 cmH2O  Average Time in Large Leak Per Day 49 secs.    Pulmonary function tests: 3/2/2018 FEV1: 1.71  (67 % predicted), FVC:  2.03 (62 % predicted), FEV1/FVC:  84 (108 % predicted)  Post bronchodilator: FEV1: 1.65  (65 % predicted), FVC:  1.94 (59 % predicted), FEV1/FVC:  85 (109 % predicted).   Restrictive air flow defect. No bronchodilator response.     Assessment:     1. SANDRITA on CPAP    2. Mild intermittent asthma without complication Well controlled   3. Obesity, Class II, BMI 35-39.9, with comorbidity    4. Obesity (BMI 30.0-34.9)      No orders of the defined types were placed in this encounter.    Plan:     Problem List Items Addressed This Visit     Asthma    Obesity (BMI 30.0-34.9)     Encouraged calorie reduction and 30 minutes of exercise daily. Discussed impact of obesity on general health.  Began walking program 45 min daily began 2 weeks ago.  Also beginning PT next week 2 twice a week.  Has lost 6 lbs since feb 2018, with walking and reducing calories.            SANDRITA on CPAP - Primary     Remains not compliant  Adherence            Other Visit Diagnoses     Obesity, Class II, BMI 35-39.9, with comorbidity  (Chronic)            (DME - [facility]  Reviewed therapeutic goals for positive airway pressure therapy Auto CPAP  Ideal is usage 100% of nights for 6 - 8 hours per night. Minimum usage is 70% of night for at least 4 hours per night used. Pateint expressed understanding.     Follow-up in about 6 months (around 9/2/2018) for CPAP compliance and asthma follow up w/cpap download. .

## 2018-03-02 NOTE — ASSESSMENT & PLAN NOTE
Encouraged calorie reduction and 30 minutes of exercise daily. Discussed impact of obesity on general health.  Began walking program 45 min daily began 2 weeks ago.  Also beginning PT next week 2 twice a week.  Has lost 6 lbs since feb 2018, with walking and reducing calories.

## 2018-03-05 ENCOUNTER — TELEPHONE (OUTPATIENT)
Dept: RADIOLOGY | Facility: HOSPITAL | Age: 63
End: 2018-03-05

## 2018-03-05 ENCOUNTER — OFFICE VISIT (OUTPATIENT)
Dept: OPHTHALMOLOGY | Facility: CLINIC | Age: 63
End: 2018-03-05
Payer: MEDICARE

## 2018-03-05 DIAGNOSIS — H52.7 REFRACTIVE ERROR: ICD-10-CM

## 2018-03-05 DIAGNOSIS — E11.3293 MILD NONPROLIFERATIVE DIABETIC RETINOPATHY OF BOTH EYES WITHOUT MACULAR EDEMA ASSOCIATED WITH TYPE 2 DIABETES MELLITUS: ICD-10-CM

## 2018-03-05 DIAGNOSIS — H40.1134 PRIMARY OPEN ANGLE GLAUCOMA OF BOTH EYES, INDETERMINATE STAGE: Primary | ICD-10-CM

## 2018-03-05 DIAGNOSIS — H25.13 CATARACT, NUCLEAR SCLEROTIC SENILE, BILATERAL: ICD-10-CM

## 2018-03-05 PROCEDURE — 92083 EXTENDED VISUAL FIELD XM: CPT | Mod: S$GLB,,, | Performed by: OPTOMETRIST

## 2018-03-05 PROCEDURE — 92015 DETERMINE REFRACTIVE STATE: CPT | Mod: S$GLB,,, | Performed by: OPTOMETRIST

## 2018-03-05 PROCEDURE — 92133 CPTRZD OPH DX IMG PST SGM ON: CPT | Mod: S$GLB,,, | Performed by: OPTOMETRIST

## 2018-03-05 PROCEDURE — 99999 PR PBB SHADOW E&M-EST. PATIENT-LVL II: CPT | Mod: PBBFAC,,, | Performed by: OPTOMETRIST

## 2018-03-05 PROCEDURE — 92014 COMPRE OPH EXAM EST PT 1/>: CPT | Mod: S$GLB,,, | Performed by: OPTOMETRIST

## 2018-03-05 RX ORDER — LATANOPROST 50 UG/ML
1 SOLUTION/ DROPS OPHTHALMIC NIGHTLY
Qty: 1 BOTTLE | Refills: 4 | Status: SHIPPED | OUTPATIENT
Start: 2018-03-05 | End: 2018-08-13 | Stop reason: SDUPTHER

## 2018-03-05 NOTE — PROGRESS NOTES
HPI     Last [facility] exam 12/06/2017  DFE (Today)  HVF/GOCT (Today)  Glaucoma  Medication: Latanoprost 0.005% 1 drop qhs OU  Patient states out of Latanoprost has not used for 1 week    Diabetic/IDDM with mild non-proliferative changes in the past.        Last edited by SOILA Dias, OD on 3/5/2018  4:06 PM. (History)            Assessment /Plan     For exam results, see Encounter Report.    Primary open angle glaucoma of both eyes, indeterminate stage  -     latanoprost 0.005 % ophthalmic solution; Place 1 drop into both eyes every evening.  Dispense: 1 Bottle; Refill: 4  -     Ch Visual Field - OU - Extended - Both Eyes    Mild nonproliferative diabetic retinopathy of both eyes without macular edema associated with type 2 diabetes mellitus    Cataract, nuclear sclerotic senile, bilateral    Refractive error      He has mild BDR OU = will follow.  His COAG notes are below.  Spec Rx given.  He has mild NS/cortical changes.  I will see him in 3 months for an IOP check.  I will repeat the OCT and the HVF in 9 months as the results today show both plusses and minuses.            COAG:   Note that he stopped his drops 2-3 weeks ago.  I renewed Rx and stressed that he use them q day.  His IOP is up today.  HVF:    OD:  Although he has had in 2017 marked defects with nasal steps, arcuate changes, testing today shows improvement in these.  He has some increase in defects superior temporally and some paracentral changes in the same area.  I will follow these.    OS:  He has had some improvement in the superior and inferior arcuate defects since last year.  He has no paracentral changes.  Overall this is an improvement over last year.  I will follow these changes.    NOTE:  He has not use his latanoprost in one to two weeks previous to this visit.  I renewed his Rx and urged him not to miss a drop OU in the future. I will see him in 3 months for an IOP check.  His IOP was up today which I suspect is due to his not taking  his meds.    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  I will follow and repeat in 9 months.    OS:  No changes in NFL study since last year.  I will repeat in 9 months.

## 2018-03-06 ENCOUNTER — HOSPITAL ENCOUNTER (OUTPATIENT)
Dept: RADIOLOGY | Facility: HOSPITAL | Age: 63
Discharge: HOME | End: 2018-03-06
Attending: FAMILY MEDICINE
Payer: MEDICARE

## 2018-03-06 ENCOUNTER — TELEPHONE (OUTPATIENT)
Dept: INTERNAL MEDICINE | Facility: CLINIC | Age: 63
End: 2018-03-06

## 2018-03-06 DIAGNOSIS — M51.26 LUMBAR DISC HERNIATION: Primary | ICD-10-CM

## 2018-03-06 DIAGNOSIS — M54.32 LEFT SIDED SCIATICA: ICD-10-CM

## 2018-03-06 PROCEDURE — 72148 MRI LUMBAR SPINE W/O DYE: CPT | Mod: TC,PO

## 2018-03-06 PROCEDURE — 72148 MRI LUMBAR SPINE W/O DYE: CPT | Mod: 26,,, | Performed by: RADIOLOGY

## 2018-03-06 NOTE — TELEPHONE ENCOUNTER
----- Message from Monique Sun LPN sent at 3/6/2018  5:16 PM CST -----  Spoke with pt and informed pt per  of his MRI results. Pt verbalized understanding. He would prefer to see neuro surgery in [place].

## 2018-03-07 RX ORDER — HYDROCODONE BITARTRATE AND ACETAMINOPHEN 10; 325 MG/1; MG/1
1 TABLET ORAL EVERY 12 HOURS PRN
Qty: 14 TABLET | Refills: 0 | Status: SHIPPED | OUTPATIENT
Start: 2018-03-07 | End: 2018-03-29 | Stop reason: SDUPTHER

## 2018-03-07 NOTE — TELEPHONE ENCOUNTER
----- Message from Kimmie Delgado sent at 3/7/2018 12:58 PM CST -----  Pt at [phone]//states he is needing a refill of his pain med//does not know the name of med//uses//[facility] in [place]//please call//thanks/francisco

## 2018-03-08 ENCOUNTER — TELEPHONE (OUTPATIENT)
Dept: INTERNAL MEDICINE | Facility: CLINIC | Age: 63
End: 2018-03-08

## 2018-03-08 NOTE — TELEPHONE ENCOUNTER
Tried calling pt back and a relative answered and said that he went to the doctor. I asked him to have him call us back if he needs us.

## 2018-03-08 NOTE — TELEPHONE ENCOUNTER
----- Message from Cheryl Hull sent at 3/8/2018 10:11 AM CST -----  Contact: [facility]  The pt request a call, no additional info given, the pt can be reached at [phone]///[email or web address]

## 2018-03-09 ENCOUNTER — TELEPHONE (OUTPATIENT)
Dept: INTERNAL MEDICINE | Facility: CLINIC | Age: 63
End: 2018-03-09

## 2018-03-09 NOTE — TELEPHONE ENCOUNTER
Nurse returned call to [facility] verified request to neurosurgery. [facility] will contact pt with appt

## 2018-03-09 NOTE — TELEPHONE ENCOUNTER
----- Message from Maxine Velazquez sent at 3/9/2018 12:52 PM CST -----  Contact: [facility]/ [facility]   Caller request call back to see what type of doctor the pt needs. [phone].

## 2018-03-14 ENCOUNTER — TELEPHONE (OUTPATIENT)
Dept: INTERNAL MEDICINE | Facility: CLINIC | Age: 63
End: 2018-03-14

## 2018-03-14 NOTE — TELEPHONE ENCOUNTER
----- Message from Carla Dillard sent at 3/14/2018 11:48 AM CDT -----  Contact: self   Patient would like to consult with nurse regarding who he was referred  to and address of appointment on 3/19/18. Please call back at [phone].      Thanks,  Carla Dillard

## 2018-03-16 NOTE — TELEPHONE ENCOUNTER
----- Message from Anna Mckay sent at 3/16/2018 12:32 PM CDT -----  Contact: pt   1. What is the name of the medication you are requesting? Pain med   2. What is the dose?   3. How do you take the medication? Orally, topically, etc? Orally   4. How often do you take this medication?   5. Do you need a 30 day or 90 day supply? 30  6. How many refills are you requesting? 1  7. What is your preferred pharmacy and location of the pharmacy?   [id number] - [place], [place] - [address] AT [place] & [facility]  [address]  [facility] [address]  Phone: [phone] Fax: [phone]  8. Who can we contact with further questions? the patient     Pt states that he want the nurse to call when med is sent to the pharmacy

## 2018-03-16 NOTE — TELEPHONE ENCOUNTER
Spoke with pt and he asked that we fax over his last MRI to who ever  is sending him to bc they asked for it and he gave their fax number at [phone]. He also requested a medication be refilled and he said its for his legs. He did not know the name of it. So I told him to call back once he knows which med he needs refilled.

## 2018-03-16 NOTE — TELEPHONE ENCOUNTER
----- Message from Shamika Mike sent at 3/16/2018  1:50 PM CDT -----  Contact: pt  He's calling in regards to refill (hyrocodine 10-325mg) (naproxen 500mg) pls call pt back at [phone] (home) [phone] (work)      [facility] [id number] - [place] - [address] AT [place] [address]  [address] [place] [address]  Phone: [phone] Fax: [phone]

## 2018-03-16 NOTE — TELEPHONE ENCOUNTER
----- Message from Constance Montez sent at 3/16/2018  1:25 PM CDT -----  Contact: Crow Maria at [phone] (home) [phone] (work) regarding upcoming surgery.

## 2018-03-19 ENCOUNTER — TELEPHONE (OUTPATIENT)
Dept: INTERNAL MEDICINE | Facility: CLINIC | Age: 63
End: 2018-03-19

## 2018-03-19 RX ORDER — HYDROCODONE BITARTRATE AND ACETAMINOPHEN 10; 325 MG/1; MG/1
1 TABLET ORAL EVERY 12 HOURS PRN
Qty: 14 TABLET | Refills: 0 | Status: SHIPPED | OUTPATIENT
Start: 2018-03-19 | End: 2018-03-26

## 2018-03-19 NOTE — TELEPHONE ENCOUNTER
----- Message from Anna Bashir sent at 3/19/2018  2:33 PM CDT -----  Contact: [facility]   Caller states that the script that was faxed over cant be fax.   ..[phone]

## 2018-03-19 NOTE — TELEPHONE ENCOUNTER
Nurse spoke with pt and informed pt to come sign [facility] to get disc. Informed pt rx will be faxed.

## 2018-03-19 NOTE — TELEPHONE ENCOUNTER
----- Message from Peggy Hearn sent at 3/19/2018 12:14 PM CDT -----  Contact: pt  Pt calling states he need a disc from his MRI, he also needs a refill on medication, he can be reached at [phone] Thanks

## 2018-03-20 ENCOUNTER — TELEPHONE (OUTPATIENT)
Dept: INTERNAL MEDICINE | Facility: CLINIC | Age: 63
End: 2018-03-20

## 2018-03-20 NOTE — TELEPHONE ENCOUNTER
----- Message from Alexandrelise Vela sent at 3/20/2018  8:41 AM CDT -----  Contact: Self- [phone]  Pharmacy waiting on Rx Medication for hip pain, Please re fax Rx to pharmacy.  Please call back at [phone]. x-        Pt Uses:  .  [email or web address] [id number] - [place] - [address] AT [place] & [facility]  [address] [place] [address]  Phone: [phone] Fax: [phone]

## 2018-03-28 RX ORDER — HYDROCODONE BITARTRATE AND ACETAMINOPHEN 10; 325 MG/1; MG/1
1 TABLET ORAL EVERY 12 HOURS PRN
Qty: 14 TABLET | Refills: 0 | Status: CANCELLED | OUTPATIENT
Start: 2018-03-28 | End: 2018-04-04

## 2018-03-28 NOTE — TELEPHONE ENCOUNTER
Patient has been notify he will need to reschedule appointment with physiatry. Patient has been schedule for 04/02/18 at 1:20 pm

## 2018-03-28 NOTE — TELEPHONE ENCOUNTER
----- Message from Salazar Baron sent at 3/28/2018  9:49 AM CDT -----  Contact: pt  1. What is the name of the medication you are requesting? Hydrocodone  2. What is the dose? 10 mg  3. How do you take the medication? Orally, topically, etc? Orally  4. How often do you take this medication? Twice daily  5. Do you need a 30 day or 90 day supply? 30  6. How many refills are you requesting? 4  7. What is your preferred pharmacy and location of the pharmacy? [facility] in [place]  8. Who can we contact with further questions? [phone] (home)     Pt states his glucose reading is very high, pls advise....

## 2018-03-29 DIAGNOSIS — K21.9 GASTROESOPHAGEAL REFLUX DISEASE, ESOPHAGITIS PRESENCE NOT SPECIFIED: ICD-10-CM

## 2018-03-29 DIAGNOSIS — I20.89 ANGINA AT REST: ICD-10-CM

## 2018-03-29 RX ORDER — PANTOPRAZOLE SODIUM 40 MG/1
40 TABLET, DELAYED RELEASE ORAL DAILY
Qty: 30 TABLET | Refills: 11 | Status: SHIPPED | OUTPATIENT
Start: 2018-03-29 | End: 2018-04-19 | Stop reason: SDUPTHER

## 2018-03-29 RX ORDER — HYDROCODONE BITARTRATE AND ACETAMINOPHEN 10; 325 MG/1; MG/1
1 TABLET ORAL EVERY 12 HOURS PRN
Qty: 14 TABLET | Refills: 0 | Status: SHIPPED | OUTPATIENT
Start: 2018-03-29 | End: 2018-04-05 | Stop reason: SDUPTHER

## 2018-03-29 NOTE — TELEPHONE ENCOUNTER
----- Message from Cheryl Hull sent at 3/29/2018  1:51 PM CDT -----  Contact: pt  The pt states he needs a refill on his Acid reflex med, the pt can be reached at [phone]///[id number]

## 2018-03-29 NOTE — TELEPHONE ENCOUNTER
----- Message from Nathen Mujica sent at 3/29/2018  1:47 PM CDT -----  Contact: pt   States he's calling to have a refill called in and pls call when called in and can be reached at [phone]//thanks/dbw     1. What is the name of the medication you are requesting? Hydrocodone   2. What is the dose? 10mg   3. How do you take the medication? Orally, topically, etc? orally  4. How often do you take this medication? 2 daily   5. Do you need a 30 day or 90 day supply? 30 days  6. How many refills are you requesting?   7. What is your preferred pharmacy and location of the pharmacy?   8. Who can we contact with further questions? Pt     [id number] - [place] - [address] AT [facility]  [address] [address]  Phone: [phone] Fax: [phone]

## 2018-04-02 ENCOUNTER — INITIAL CONSULT (OUTPATIENT)
Dept: PHYSICAL MEDICINE AND REHAB | Facility: CLINIC | Age: 63
End: 2018-04-02
Payer: MEDICARE

## 2018-04-02 VITALS
DIASTOLIC BLOOD PRESSURE: 90 MMHG | WEIGHT: 210 LBS | BODY MASS INDEX: 34.99 KG/M2 | SYSTOLIC BLOOD PRESSURE: 150 MMHG | HEART RATE: 105 BPM | HEIGHT: 65 IN | RESPIRATION RATE: 14 BRPM

## 2018-04-02 DIAGNOSIS — M47.26 OSTEOARTHRITIS OF SPINE WITH RADICULOPATHY, LUMBAR REGION: Primary | ICD-10-CM

## 2018-04-02 DIAGNOSIS — M51.26 HNP (HERNIATED NUCLEUS PULPOSUS), LUMBAR: ICD-10-CM

## 2018-04-02 PROCEDURE — 99999 PR PBB SHADOW E&M-EST. PATIENT-LVL III: CPT | Mod: PBBFAC,,, | Performed by: PHYSICAL MEDICINE & REHABILITATION

## 2018-04-02 PROCEDURE — 3080F DIAST BP >= 90 MM HG: CPT | Mod: CPTII,S$GLB,, | Performed by: PHYSICAL MEDICINE & REHABILITATION

## 2018-04-02 PROCEDURE — 3077F SYST BP >= 140 MM HG: CPT | Mod: CPTII,S$GLB,, | Performed by: PHYSICAL MEDICINE & REHABILITATION

## 2018-04-02 PROCEDURE — 99204 OFFICE O/P NEW MOD 45 MIN: CPT | Mod: S$GLB,,, | Performed by: PHYSICAL MEDICINE & REHABILITATION

## 2018-04-02 RX ORDER — BACLOFEN 10 MG/1
10 TABLET ORAL NIGHTLY PRN
Qty: 30 TABLET | Refills: 1 | Status: SHIPPED | OUTPATIENT
Start: 2018-04-02 | End: 2018-09-13

## 2018-04-02 RX ORDER — PREDNISONE 10 MG/1
TABLET ORAL
COMMUNITY
Start: 2018-03-29 | End: 2018-05-23

## 2018-04-02 RX ORDER — NITROGLYCERIN 0.3 MG/1
TABLET SUBLINGUAL
Qty: 100 TABLET | Refills: 0 | Status: SHIPPED | OUTPATIENT
Start: 2018-04-02 | End: 2019-06-27 | Stop reason: SDUPTHER

## 2018-04-02 RX ORDER — GABAPENTIN 600 MG/1
600 TABLET ORAL 3 TIMES DAILY
Qty: 90 TABLET | Refills: 1 | Status: SHIPPED | OUTPATIENT
Start: 2018-04-02 | End: 2018-10-09 | Stop reason: SDUPTHER

## 2018-04-02 NOTE — PROGRESS NOTES
PM&R NEW PATIENT HISTORY & PHYSICAL :    Referring Physician:    Chief Complaint   Patient presents with    Back Pain    Leg Pain     left        HPI: This is a 62 y.o.  male being seen in clinic today for evaluation of low back and left leg pain that has worsened since January.  With prolonged walking or standing, his symptoms worsen and gabapentin provides minimal relief.  He has to change position frequently for relief.  After his MRI, he was referred to NS by Dr Lambert (extruded disc at L5-S1).  He is supposed to be starting PT at [facility] and is agreeable to have an updated order.     History obtained from patient    Functional History:  Walking: limited  Transfers: Independent  Assistive devices: No  Power mobility: No  Falls: None     Needs help with:  Nothing - all ADLS normal    Cooking   Cleaning  Bathing   Dressing   Toileting     Past family, medical, social, and surgical history reviewed in chart    Review of Systems:     General- denies lethargy, weight change, fever, chills  Head/neck- denies swallowing difficulties  ENT- denies hearing changes  Cardiovascular-denies chest pain  Pulmonary- +shortness of breath-Asthma  GI- denies constipation or bowel incontinence  - denies bladder incontinence  Skin- denies wounds or rashes  Musculoskeletal- +weakness, +pain  Neurologic- +numbness and tingling  Psychiatric- denies depressive or psychotic features, denies anxiety  Lymphatic-denies swelling  Endocrine- denies hypoglycemic symptoms/+DM history  All other pertinent systems negative     Physical Examination:  General: Well developed, well nourished male, NAD  HEENT:NCAT EOMI bilaterally   Pulmonary:Normal respirations    Spinal Examination: CERVICAL  Active ROM is within normal limits.  Inspection: No deformity of spinal alignment.    Spinal Examination: LUMBAR or THORACIC  Active ROM is limited in all planes  Inspection: No deformity of spinal alignment.  No palpable olisthesis.  Palpation: No vertebral  tenderness to percussion.  Tight and tender along paraspinals on left, ttp at si joint on left  SLR Test (seated):+ on left    Bilateral Upper and Lower Extremities:  Pulses are 2+ at radial, bilaterally.  Shoulder/Elbow/Wrist/Hand ROM   Hip/Knee/Ankle ROM wnl except mild limitation at left leg due to discomfort   Bilateral Extremities show normal capillary refill.  No signs of cyanosis, rubor, edema, skin changes, or dysvascular changes of appendages.  Nails appear intact.    Neurological Exam:  Cranial Nerves:  II-XII grossly intact    Manual Muscle Testing: (Motor 5=normal)  RIGHT Lower extremity: Hip flexion 5/5, Hip Abduction 5/5, Hip Adduction 5/5, Knee extension 5/5, Knee flexion 5/5, Ankle dorsiflexion 5/5, Extensor hallucis longus 5/5, Ankle plantarflexion 5/5  LEFT Lower extremity:  Hip flexion 4/5, Hip Abduction 5/5,Hip Adduction 5/5, Knee extension 4p/5, Knee flexion 5/5, Ankle dorsiflexion 5/5, Extensor hallucis longus 5/5, Ankle plantarflexion 5/5    No focal atrophy is noted of either lower extremity.    Bilateral Reflexes:hypo at patellar  No clonus at knee or ankle.    Sensation: tested to light touch  - intact in legs except dec at post left leg to calf  Gait: Narrow base and good arm swing.    IMPRESSION/PLAN: This is a 62 y.o.  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    1. Rx for PT-update at [facility]-Core and lower ext strengthening, myofascial release, dec pain, light traction, modalities   2. Ice/heat modalities  3. Increase gabapentin 600mg TID, add baclofen 10mg QHS prn, finish steroid taper  4. Handouts on back care, stretch, exercise, etc provided  5. Pt has appt with NSG in 2 weeks    María Elena Sorensen M.D.  Physical Medicine and Rehab

## 2018-04-02 NOTE — PATIENT INSTRUCTIONS
Arthritis: Exercise     Look for exercise classes for arthritis in your community.     Exercise is important to your overall health. It is especially important in people with arthritis. Regular exercise can:  · Keep your heart and blood vessels healthy  · Help with weight management, or weight loss  · Improve your mood  · Help prevent and manage health problems such as:  ¨ Diabetes  ¨ High blood pressure  ¨ High cholesterol  ¨ Depression  In people with arthritis, it offers all of those benefits and it can:  · Lessen pain and stiffness  · Strengthen muscles that support your joints  · Help you to be able to do the things you enjoy  Exercise and arthritis  Exercise is an important part of any arthritis treatment plan. A complete program consists of the following three types of exercises:  · Aerobic exercises for cardiovascular health and overall fitness.   · Strengthening exercises to build up muscles to help prevent injury and keep joints stable.  · Range-of-motion exercises to keep muscles and joints flexible.  Getting started  Talk with your healthcare provider about what is safe for you. Make sure you:  · Learn how to do exercises properly and safely. Consider talking with a physical therapist or  used to working with people with arthritis.  · Start gradually and build. If you haven't been exercising, start slowly. Don't exercise too hard or too long.  · Create a routine. Set aside specific times for exercise every day.  · Warm up carefully. Take 5 to 10 minutes at the beginning and end of exercising to warm up and cool down. Just do the same exercises at a slower pace for 5 to 10 minutes.  · Work at a comfortable, smooth pace. Move your joints gently to prevent injury.  · Pay attention to your body. Don't exercise a painful or swollen joint; switch to another activity. Follow the 2-hour pain rule: You did too much if your joint or muscle pain lasts 2 hours or more after exercising, or is worse the next  day. This doesn't mean you should stop exercising. Just do less.  Aerobic exercise  Aerobic exercise improves overall health and helps control weight. Choose those that don't add extra stress to your joints. For example, walking, swimming, or bicycling.  Most people should exercise for at least 30 minutes. most days of the week. You don't have to exercise all at once. Try exercising for 10 minutes, 3 times a day, for example.  Strengthening exercises  Strengthening your muscles help to protect your joints and prevent injuries. Try to do strengthening exercises 2 to 3 times a week:  · These exercises can be done with exercise or resistance bands (inexpensive exercise aids that add resistance), or with light weights. Some people use soup cans as weights.  · Isometric exercises are done by tightening the muscles without moving the joint. This may be a good way to strengthen the muscles around a stiff joint.  A physical therapist or  can teach you how to do these exercises.  Range-of-motion (ROM) exercises  Range-of-motion (ROM) exercises allow you to move each of your joints in every way they are intended to move. You should do ROM exercises for each joint 2 to 3 times a day. This will help you maintain full use of all of your joints.  Sample ROM exercises  The following are just a sample of ROM exercises--one for your neck, shoulders, elbows, hips, knees, and ankles. To completely move each joint through its full range of motion, you will have to do a few exercises for each joint. A physical therapist or  can teach you how to do full ROM exercises for each joint.   Repeat each for these exercises 5 to 10 times. Make sure you move slowly:  1. Neck turns. Sit in a straight-backed chair. Look straight ahead. Slowly turn your head to the right, then return it to center. Repeat. Do the same thing, turning your head to the left. Repeat.  2. Shoulder raise. Lie on your back or sit in a chair. Raise one arm over  your head, keeping your elbow straight. Keep the arm close to your ear. Return it slowly to your side. Repeat with your other arm.  3. Elbow stretch. Sit in a chair. If you are able, put both arms out to your sides to form a T. Slowly touch your shoulders with the tips of your fingers. Then return to the T-position. Repeat.  4. Hip stretch. Lie on your back with your legs straight and about 6 inches apart. With your foot flexed, slide your leg out to the side, then slide it back to the starting position. Repeat with your other leg.  5. Knee bend. Sit in a chair with your legs bent at the knees in front of you. Straighten one leg as much as you can, then bring it back to the floor. Repeat this 5 to 10 times. Then do the same thing with the other leg.   6. Ankle stretch. Sit with your feet flat on the floor. Lift your toes off of the floor while your heels stay down. Repeat. Then lift your heels off the floor while your toes stay down. Repeat.  Other exercise  Many other exercise and activities benefit people with arthritis. It is most important to find exercise and activities that you enjoy. You might try:  · Yoga, including chair yoga, helps to keep your joints strong and flexible.   · [employer], an ancient type of exercise with slow, gentle movements  · Water exercise, including water walking  For more information on exercise for arthritis go to the [employer] website: www.arthritis.org.  Date Last Reviewed: 2/14/2016  © 8834-9701 [employer]. [address] [place] [address], [place] [address]. All rights reserved. This information is not intended as a substitute for professional medical care. Always follow your healthcare professional's instructions.        Possible Causes of Low Back or Leg Pain    The symptoms in your back or leg may be due to pressure on a nerve. This pressure may be caused by a damaged disk or by abnormal bone growth. Either way, you may feel pain, burning, tingling, or numbness.  If you have pressure on a nerve that connects to the sciatic nerve, pain may shoot down your leg.    Pressure from the disk  Constant wear and tear can weaken a disk over time and cause back pain. The disk can then be damaged by a sudden movement or injury. If its soft center begins to bulge, the disk may press on a nerve. Or the outside of the disk may tear, and the soft center may squeeze through and pinch a nerve.    Pressure from bone  As a disk wears out, the vertebrae right above and below the disk begin to touch. This can put pressure on a nerve. Often, abnormal bone (called bone spurs) grows where the vertebrae rub against each other. This can cause the foramen or the spinal canal to narrow (called stenosis) and press against a nerve.  Date Last Reviewed: 10/4/2015  © 2180-4359 [employer]. [address] [place] [address]. All rights reserved. This information is not intended as a substitute for professional medical care. Always follow your healthcare professional's instructions.        Causes of Lumbar (Low Back) Pain  Low back pain can be caused by problems with any part of the lumbar spine. A disk can herniate (push out) and press on a nerve. Vertebrae can rub against each other or slip out of place. This can irritate facet joints and nerves. It can also lead to stenosis, a narrowing of the spinal canal or foramen.  Pressure from a disk  Constant wear and tear on a disk can cause it to weaken and push outward. Part of the disk may then press on nearby nerves. There are two common types of herniated disks:  Contained means the soft nucleus is protruding outward.   Extruded means the firm annulus has torn, letting the soft center squeeze through.     Pressure from bone  An unstable spine   With age, a disk may thin and wear out. Vertebrae above and below the disk may begin to touch. This can put pressure on nerves. It can also cause bone spurs (growths) to form where the bones rub  together.    Stenosis results when bone spurs narrow the foramen or spinal canal. This also puts pressure on nerves. Slipping vertebrae can irritate nerves and joints. They can also worsen stenosis.    In some cases, vertebrae become unstable and slip forward. This is called spondylolisthesis.     Date Last Reviewed: 10/12/2015  © 5567-3913 [employer] [address] [place] [address]. All rights reserved. This information is not intended as a substitute for professional medical care. Always follow your healthcare professional's instructions.        Back Safety: Poor Posture Hurts  An unhealthy spine often starts with bad habits. Poor movement patterns and posture problems are common causes of back pain. Disk, bone, nerve, and soft tissue problems can all be affected by poor posture. They can lead to pain, stiffness, and other symptoms.    Poor posture backfires  Poor posture can cause pain. Too much slouching puts increased pressure on the disks. An excessive lumbar curve can overload and inflame the vertebrae. As a result, the back muscles may tighten or spasm to splint and protect the spine. This adds to the pain you feel.    Proper posture: The key to safe movement  Your spine bears your weight throughout the day. This is true whether youre sleeping, standing, or bending. Certain positions strain your spine more than others. But by maintaining proper posture in all positions, you can reduce the stress on your spine.       To improve your standing posture, follow these steps:  · Breathe deeply.  · Relax your shoulders, hips, and ankles. · Think of the ears, shoulders, hips, and ankles as a series of dots. Now, adjust your body to connect the dots in a straight line.  · Tuck your buttocks in just a bit if you need to.      Date Last Reviewed: 10/28/2015  © 0430-7179 [employer] [address] [place] [address]. All rights reserved. This information is not intended  as a substitute for professional medical care. Always follow your healthcare professional's instructions.        Relieving Tension in Your Back  Being relaxed helps keep your mind healthy and your back ready to move. Take short breaks often. Walk around. Stretch. Switch tasks. Also give the following a try.  Make time to relax. Start by setting aside 5 minutes daily.   Deep breathing    Deep breathing is a simple way to reduce stress. You can do it almost any time you need to relax.  · Inhale slowly through your nose. Let your lungs and stomach expand.  · Hold your breath for 2 to 3 seconds.  · Exhale slowly through your mouth until your lungs feel empty. Repeat 3 to 4 times.  Relieve tension  Muscle tension can create tender spots called trigger points. The tips below may help relieve muscle tension.  · Press the trigger point if you can reach it. If not, lie on a soft tennis ball, or ask a friend to press the spot. Use steady pressure for 10 to 15 seconds. Breathe deeply. Repeat a few times.  · Massage trigger points with ice for 2 to 5 minutes. Press lightly at first. Slowly increase firmness.  Date Last Reviewed: 10/18/2015  © 5669-2629 [employer]. [address] [place] [address]. All rights reserved. This information is not intended as a substitute for professional medical care. Always follow your healthcare professional's instructions.        Degenerative Disk Disease    Spinal disks are gel-filled cushions between the bones, or vertebrae, of the spine. The disks act like shock absorbers. Over time, the disks may break down. This is called degenerative disk disease.  This condition can affect the neck or back. It is one of the most common causes of low back pain. It is the leading cause of disability in people under age 45 in the [place]. The pain often remains localized to the lower back or neck. Muscle spasm is often present and adds to the pain.  Disk degeneration is a natural  part of aging. But it is not painful for most people. It may also occur as a result of repeated minor injuries due to daily activities, sports, or accidents. It may lead to osteoarthritis of the spine. Back pain related to disk disease may come and go. Or it may become chronic and last for months or years. The disk may bulge or rupture. This is called a slipped disk or herniated disk. That can put pressure on a nearby spinal nerve and cause neck or back pain that spreads down one arm or leg.  X-rays or an MRI may help to diagnose this condition. For acute pain, treatment includes anti-inflammatory medicines, muscle relaxants, rest, ice, or heat. Strong prescription pain medicines, called opioids, may be needed for short-term treatment if pain suddenly gets worse. Opioid medicines can be addictive. So they are not advised for long-term pain management. Other types of medicines are preferred. Surgery is generally not used to treat this condition unless there is a complication.    Home care  · For neck pain: Use a comfortable pillow that supports the head and keeps the spine in a neutral position. Your head should not be tilted forward or backward.  · For back pain: Avoid sitting for long periods of time. This puts more stress on the lower back than standing or walking. Starting a regular exercise program to strengthen the supporting muscles of the spine will make it easier to live with degenerative disk disease.  · Apply an ice pack over the injured area for no more than 15 to 20 minutes. Do this every 3 to 6 hours for the first 24 to 48 hours. To make an ice pack, put ice cubes in a plastic bag that seals at the top. Wrap the bag in a clean, thin towel or cloth. Never put ice or an ice pack directly on the skin. Keep using ice packs to ease pain and swelling as needed. After 48 hours, apply heat (warm shower or warm bath) for 20 minutes several times a day, or switch between ice and heat.   · You may  use over-the-counter pain medicine to control pain, unless another pain medicine was prescribed. If you have chronic liver or kidney disease or ever had a stomach ulcer or GI bleeding, talk with your provider before using these medicines.  Follow-up care  Follow up with your healthcare provider, or as directed.  If X-rays, a CT scan or an MRI were done, you will be notified of any new findings that may affect your care.  When to seek medical advice  Contact your healthcare provider right away if any of these occur:  · Increasing back pain  · Your foot drags when you walk, a condition called foot drop  · You have new weakness, numbness, or pain in one or both arms or legs  · Loss of bowel or bladder control  · Numbness or tingling in the buttock or groin area  Date Last Reviewed: 11/23/2015  © 5427-6589 [employer]. [address] [place] [address]. All rights reserved. This information is not intended as a substitute for professional medical care. Always follow your healthcare professional's instructions.        Back Safety: Getting Into and Out of Bed  Good posture protects your back when you sit, stand, and walk. It is also important while getting into and out of bed. Follow the steps below to get out of bed. Reverse them to get into bed.  Safety tip: Sit at the side of the bed for a few seconds before standing up. Then, after you stand up, wait a moment before walking to be sure youre not dizzy.      Roll onto your side.   1. Roll onto your side  · Keep your knees together.  · Flatten your stomach muscles to keep your back from arching.  · Put your hands on the bed in front of you.     Raise your body.   2. Raise your body  · Push your upper body off the bed as you swing your legs to the floor.  · Keeping your back straight, move your whole body as one unit. Dont bend or twist at the waist.  · Let the weight of your legs help you move.     Stand up.   3. Stand up  · Lean forward from your  hip and roll onto the balls of your feet.  · Flatten your stomach muscles to keep your back from arching.  · Using your arm and leg muscles, push yourself to a standing position.  Date Last Reviewed: 8/31/2015 © 2000-2017 [employer]. [address] [place] [address]. All rights reserved. This information is not intended as a substitute for professional medical care. Always follow your healthcare professional's instructions.        Herniated Intervertebral Disk  A herniated disk happens when a spinal disk tears. Spinal disks are gel-filled cushions that sit between the bones of the spine (vertebrae). If a disk tears, the center may bulge out. The disk may then press on nearby nerves. This can cause severe pain. Numbness or tingling in an arm or leg may also occur.  You may need X-rays of the spine. If you've had the pain for a few weeks and it is not getting better after treatment, you may need an MRI scan. Treatment for a herniated disk usually involves pain medicines and home care. Surgery is not usually done to treat a herniated disk unless the nerve problem is causing weakness or numbness.    Home care  Medicines may be prescribed to help relieve pain, spasm, and swelling. You may be given a prescription. Or you may be told to use an over-the-counter pain reliever such as ibuprofen or naproxen. Take all medicines as directed.  For neck pain:  · Use a pillow that supports your head and keeps your spine in a neutral position. Don't tilt your head forward or backward.  For back pain:  · Try not to sit for long periods, especially if your low back is affected. Sitting puts more stress on the low back than standing or walking.  · Sleep on a firm mattress with a pillow under your knees.  · Have a regular exercise program to help strengthen the muscles that support the spine.  · Avoid complete bed rest. It usually isnt helpful. It may even make your condition worse.  General care  · During the  first 2 days after a pain flare-up, put an ice pack or bag of frozen peas on the painful area for 20 minutes. You can make your own ice pack by putting ice cubes in a plastic bag. Wrap the bag in a thin towel. Use the ice pack every 2 to 4 hours. This helps reduce swelling and pain.   · Physical therapy or osteopathic manipulative therapy may help if the pain doesnt go away. Talk with your healthcare provider about these therapies.  Follow-up care  Follow up with your healthcare provider, or as advised. This is very important. If youve been referred to a specialist, make an appointment right away.  When to seek medical advice  Call your healthcare provider right away if any of these occur:  · Back pain gets worse  · New weakness, numbness, or pain in one or both arms or legs  · Numbness or tingling in your buttock or groin area  · Your foot drags as you walk or you have new weakness in a foot  · You cant control your bowel or bladder  · You arent able to have a bowel movement or pass urine  · Fever of 100.4°F (38°C) or higher, or as advised  · New symptoms that were not present during todays visit  Date Last Reviewed: 8/1/2016  © 3557-8946 [employer]. [address] [place] [address], [place] [address]. All rights reserved. This information is not intended as a substitute for professional medical care. Always follow your healthcare professional's instructions.        Exercises to Strengthen Your Lower Back  Strong lower back and abdominal muscles work together to support your spine. The exercises below will help strengthen the lower back. It is important that you begin exercising slowly and increase levels gradually.  Always begin any exercise program with stretching. If you feel pain while doing any of these exercises, stop and talk to your doctor about a more specific exercise program that better suits your condition.   Low back stretch  The point of stretching is to make you more flexible and increase  your range of motion. Stretch only as much as you are able. Stretch slowly. Do not push your stretch to the limit. If at any point you feel pain while stretching, this is your (temporary) limit.  · Lie on your back with your knees bent and both feet on the ground.  · Slowly raise your left knee to your chest as you flatten your lower back against the floor. Hold for 5 seconds.  · Relax and repeat the exercise with your right knee.  · Do 10 of these exercises for each leg.  · Repeat hugging both knees to your chest at the same time.  Building lower back strength  Start your exercise routine with 10 to 30 minutes a day, 1 to 3 times a day.  Initial exercises  Lying on your back:  1. Ankle pumps: Move your foot up and down, towards your head, and then away. Repeat 10 times with each foot.  2. Heel slides: Slowly bend your knee, drawing the heel of your foot towards you. Then slide your heel/foot from you, straightening your knee. Do not lift your foot off the floor (this is not a leg lift).  3. Abdominal contraction: Bend your knees and put your hands on your stomach. Tighten your stomach muscles. Hold for 5 seconds, then relax. Repeat 10 times.  4. Straight leg raise: Bend one leg at the knee and keep the other leg straight. Tighten your stomach muscles. Slowly lift your straight leg 6 to 12 inches off the floor and hold for up to 5 seconds. Repeat 10 times on each side.  Standin. Wall squats: Stand with your back against the wall. Move your feet about 12 inches away from the wall. Tighten your stomach muscles, and slowly bend your knees until they are at about a 45 degree angle. Do not go down too far. Hold about 5 seconds. Then slowly return to your starting position. Repeat 10 times.  2. Heel raises: Stand facing the wall. Slowly raise the heels of your feet up and down, while keeping your toes on the floor. If you have trouble balancing, you can touch the wall with your hands. Repeat 10 times.  More advanced  exercises  When you feel comfortable enough, try these exercises.  1. Kneeling lumbar extension: Begin on your hands and knees. At the same time, raise and straighten your right arm and left leg until they are parallel to the ground. Hold for 2 seconds and come back slowly to a starting position. Repeat with left arm and right leg, alternating 10 times.  2. Prone lumbar extension: Lie face down, arms extended overhead, palms on the floor. At the same time, raise your right arm and left leg as high as comfortably possible. Hold for 10 seconds and slowly return to start. Repeat with left arm and right leg, alternating 10 times. Gradually build up to 20 times. (Advanced: Repeat this exercise raising both arms and both legs a few inches off the floor at the same time. Hold for 5 seconds and release.)  3. Pelvic tilt: Lie on the floor on your back with your knees bent at 90 degrees. Your feet should be flat on the floor. Inhale, exhale, then slowly contract your abdominal muscles bringing your navel toward your spine. Let your pelvis rock back until your lower back is flat on the floor. Hold for 10 seconds while breathing smoothly.  4. Abdominal crunch: Perform a pelvic tilt (above) flattening your lower back against the floor. Holding the tension in your abdominal muscles, take another breath and raise your shoulder blades off the ground (this is not a full sit-up). Keep your head in line with your body (dont bend your neck forward). Hold for 2 seconds, then slowly lower.  Date Last Reviewed: 6/1/2016  © 0246-5557 [employer]. [address] [place] [address], [place] [address]. All rights reserved. This information is not intended as a substitute for professional medical care. Always follow your healthcare professional's instructions.

## 2018-04-02 NOTE — LETTER
April 2, 2018      [place]  [address] [address] [address]           [facility] - Physiatry  [address] [facility] [address]  Phone: [phone]  Fax: [phone]          Patient: Crow Green   MR Number: [id number]   YOB: 1955   Date of Visit: 4/2/2018       Dear Dr. Mateo Lambert:    Thank you for referring Crow Green to me for evaluation. Attached you will find relevant portions of my assessment and plan of care.    If you have questions, please do not hesitate to call me. I look forward to following Crow Green along with you.    Sincerely,    María Elena Sorensen MD    Enclosure  CC:  No Recipients    If you would like to receive this communication electronically, please contact [email or web address] or [phone] to request more information on [employer] access.    For providers and/or their staff who would like to refer a patient to [facility], please contact us through our one-stop-shop provider referral line, [facility], at [phone].    If you feel you have received this communication in error or would no longer like to receive these types of communications, please e-mail [email or web address]

## 2018-04-05 NOTE — TELEPHONE ENCOUNTER
----- Message from Favian Amaya sent at 4/5/2018  1:28 PM CDT -----  Contact: Pt  Pt needs a refill on his pain medication. Please give pt a call at [phone]      ..  [facility] [id number] - [facility] [place] - [address] AT [address] [facility]  [address] [address]  Phone: [phone] Fax: [phone]

## 2018-04-05 NOTE — TELEPHONE ENCOUNTER
----- Message from Favian Amaya sent at 4/5/2018  1:28 PM CDT -----  Contact: Pt  Pt needs a refill on his pain medication. Please give pt a call at [phone]      ..  [facility] [address] - [facility] [place] - [address] AT [address] [facility]  [address] [address]  Phone: [phone] Fax: [phone]

## 2018-04-05 NOTE — TELEPHONE ENCOUNTER
Pt called requesting pain medication refill. Pt seen by Dr. María Elena Corrigan on 4/2/18. Pt states he will be getting injection on 4/11/18. Please advise

## 2018-04-09 RX ORDER — HYDROCODONE BITARTRATE AND ACETAMINOPHEN 10; 325 MG/1; MG/1
1 TABLET ORAL EVERY 12 HOURS PRN
Qty: 14 TABLET | Refills: 0 | Status: SHIPPED | OUTPATIENT
Start: 2018-04-09 | End: 2018-04-17 | Stop reason: SDUPTHER

## 2018-04-12 ENCOUNTER — TELEPHONE (OUTPATIENT)
Dept: INTERNAL MEDICINE | Facility: CLINIC | Age: 63
End: 2018-04-12

## 2018-04-12 NOTE — TELEPHONE ENCOUNTER
----- Message from Jean Christie sent at 4/12/2018 10:53 AM CDT -----  Contact: self [phone]  Would like to consult with nurse regarding issue with back pain, would like to have something called to pharmacy.  Please call back at [phone].  Md Jocelyn            Pt uses.  [facility] [employer] [facility] [address] [place] - [address] AT [place] & [facility]  [address] [address]  Phone: [phone] Fax: [phone]

## 2018-04-13 ENCOUNTER — TELEPHONE (OUTPATIENT)
Dept: INTERNAL MEDICINE | Facility: CLINIC | Age: 63
End: 2018-04-13

## 2018-04-13 NOTE — TELEPHONE ENCOUNTER
----- Message from Nathen Mujica sent at 4/13/2018  1:58 PM CDT -----  Contact: PT   States he's calling rg needing a refill and is in pain and states that the shot that he was given isn't working and can be reached at [phone]    1. What is the name of the medication you are requesting? Hydrocodone   2. What is the dose? 10mg   3. How do you take the medication? Orally, topically, etc? Orally   4. How often do you take this medication? As needed, twice daily   5. Do you need a 30 day or 90 day supply? 30 days  6. How many refills are you requesting?   7. What is your preferred pharmacy and location of the pharmacy?   8. Who can we contact with further questions? Pt       [facility] [id number] - [facility] [place] - [address] AT [address]  [address] [address]  Phone: [phone] Fax: [phone]

## 2018-04-13 NOTE — TELEPHONE ENCOUNTER
Pt call returned, pt informed its too soon for refills. Last refill on 4/9/18. Nurse informed pt to contact  office and informed of continue pain after injection

## 2018-04-16 NOTE — TELEPHONE ENCOUNTER
Request refill for norco, refill sent in on 4/9/2018 for 14 tablets. States he is in severe pain and needs refill.

## 2018-04-16 NOTE — TELEPHONE ENCOUNTER
----- Message from Maxine Velazquez sent at 4/16/2018 11:59 AM CDT -----  Contact: Pt   Pt request call back ..) [phone] (work)

## 2018-04-17 ENCOUNTER — TELEPHONE (OUTPATIENT)
Dept: PHYSICAL MEDICINE AND REHAB | Facility: CLINIC | Age: 63
End: 2018-04-17

## 2018-04-17 ENCOUNTER — TELEPHONE (OUTPATIENT)
Dept: INTERNAL MEDICINE | Facility: CLINIC | Age: 63
End: 2018-04-17

## 2018-04-17 RX ORDER — HYDROCODONE BITARTRATE AND ACETAMINOPHEN 10; 325 MG/1; MG/1
1 TABLET ORAL EVERY 12 HOURS PRN
Qty: 10 TABLET | Refills: 0 | Status: SHIPPED | OUTPATIENT
Start: 2018-04-17 | End: 2018-04-24 | Stop reason: SDUPTHER

## 2018-04-17 RX ORDER — HYDROCODONE BITARTRATE AND ACETAMINOPHEN 10; 325 MG/1; MG/1
1 TABLET ORAL EVERY 12 HOURS PRN
Qty: 14 TABLET | Refills: 0 | OUTPATIENT
Start: 2018-04-17 | End: 2018-04-24

## 2018-04-17 NOTE — TELEPHONE ENCOUNTER
Called back.  tried to talk to pt about recommendation from neurosurgery and increasing of gabapentin, pt started to argue that nothing was helping and hung up on .

## 2018-04-17 NOTE — TELEPHONE ENCOUNTER
----- Message from Cheryl Hull sent at 4/17/2018  1:53 PM CDT -----  Contact: pt  The pt states he is out of his pain medication and is in pain, the pt wantst to be advised, can be reached at [phone]///thxMW

## 2018-04-17 NOTE — TELEPHONE ENCOUNTER
He was supposed to have appointment with neurosurgery. What happened to that? He will need visit prior to another refill of norco.

## 2018-04-17 NOTE — TELEPHONE ENCOUNTER
----- Message from Maxine Velazquez sent at 4/17/2018  2:29 PM CDT -----  Contact: Pt   Pt request call back .[phone] (home) [phone] (work)'

## 2018-04-17 NOTE — TELEPHONE ENCOUNTER
----- Message from Kimmie Natarajan sent at 4/17/2018  3:26 PM CDT -----  Pt at [phone]//states he is returning your call//please call again//cassandra/francisco

## 2018-04-17 NOTE — TELEPHONE ENCOUNTER
patient states he is in pain now. It is 10/10 on pain. Injection was given on Thursday. Patient states he call our office and was told to contact office who gave him the injections. Patient states call Dr. Sorensen office and was told they can do nothing about his medicine. Patient states has muscles relaxers but that is not helping. Informed patient will sent a message to doctor and call him back with his recommendation. Please advise

## 2018-04-17 NOTE — TELEPHONE ENCOUNTER
----- Message from Aixa Smiley sent at 4/17/2018  1:27 PM CDT -----  Contact: patient  Calling concerning the status of his RX Norco request. States he have called a few times and no one returned his call. Please call patient ASAP today URGENT!! @ [phone]. Thanks, [facility] [id number] - [place] - [address] AT [facility]  [address] [address]  Phone: [phone] Fax: [phone]

## 2018-04-17 NOTE — TELEPHONE ENCOUNTER
Nurse returned call to pt, pt advised to contact Dr María Elena Sorensen nurse to inform of continued pain after injections. Number given to pt [phone]. Pt voiced understanding

## 2018-04-17 NOTE — TELEPHONE ENCOUNTER
----- Message from Favian Amaya sent at 4/17/2018  2:14 PM CDT -----  Contact: Pt  Please give pt a call at [phone] (work)he was returning the nurse call.

## 2018-04-19 ENCOUNTER — TELEPHONE (OUTPATIENT)
Dept: INTERNAL MEDICINE | Facility: CLINIC | Age: 63
End: 2018-04-19

## 2018-04-19 ENCOUNTER — OFFICE VISIT (OUTPATIENT)
Dept: INTERNAL MEDICINE | Facility: CLINIC | Age: 63
End: 2018-04-19
Payer: MEDICARE

## 2018-04-19 VITALS
HEIGHT: 65 IN | SYSTOLIC BLOOD PRESSURE: 130 MMHG | HEART RATE: 84 BPM | OXYGEN SATURATION: 96 % | DIASTOLIC BLOOD PRESSURE: 72 MMHG | WEIGHT: 206.56 LBS | RESPIRATION RATE: 16 BRPM | TEMPERATURE: 98 F | BODY MASS INDEX: 34.41 KG/M2

## 2018-04-19 DIAGNOSIS — K21.9 GASTROESOPHAGEAL REFLUX DISEASE WITHOUT ESOPHAGITIS: ICD-10-CM

## 2018-04-19 DIAGNOSIS — M54.31 BILATERAL SCIATICA: Primary | ICD-10-CM

## 2018-04-19 DIAGNOSIS — M25.551 RIGHT HIP PAIN: ICD-10-CM

## 2018-04-19 DIAGNOSIS — M25.511 CHRONIC RIGHT SHOULDER PAIN: ICD-10-CM

## 2018-04-19 DIAGNOSIS — K21.9 GASTROESOPHAGEAL REFLUX DISEASE, ESOPHAGITIS PRESENCE NOT SPECIFIED: ICD-10-CM

## 2018-04-19 DIAGNOSIS — M54.32 BILATERAL SCIATICA: Primary | ICD-10-CM

## 2018-04-19 DIAGNOSIS — G89.29 CHRONIC RIGHT SHOULDER PAIN: ICD-10-CM

## 2018-04-19 DIAGNOSIS — M19.90 ARTHRITIS: ICD-10-CM

## 2018-04-19 PROCEDURE — 99214 OFFICE O/P EST MOD 30 MIN: CPT | Mod: S$GLB,,, | Performed by: FAMILY MEDICINE

## 2018-04-19 PROCEDURE — 3075F SYST BP GE 130 - 139MM HG: CPT | Mod: CPTII,S$GLB,, | Performed by: FAMILY MEDICINE

## 2018-04-19 PROCEDURE — 3078F DIAST BP <80 MM HG: CPT | Mod: CPTII,S$GLB,, | Performed by: FAMILY MEDICINE

## 2018-04-19 PROCEDURE — 99999 PR PBB SHADOW E&M-EST. PATIENT-LVL IV: CPT | Mod: PBBFAC,,, | Performed by: FAMILY MEDICINE

## 2018-04-19 RX ORDER — NAPROXEN 500 MG/1
500 TABLET ORAL 2 TIMES DAILY WITH MEALS
Qty: 14 TABLET | Refills: 0 | Status: SHIPPED | OUTPATIENT
Start: 2018-04-19 | End: 2018-05-28 | Stop reason: SDUPTHER

## 2018-04-19 RX ORDER — PANTOPRAZOLE SODIUM 40 MG/1
40 TABLET, DELAYED RELEASE ORAL DAILY
Qty: 30 TABLET | Refills: 11 | Status: SHIPPED | OUTPATIENT
Start: 2018-04-19 | End: 2018-04-23 | Stop reason: SDUPTHER

## 2018-04-19 NOTE — PROGRESS NOTES
Subjective:       Patient ID: Crow Green is a 62 y.o. male.    Chief Complaint: Pain (back and legs)    Pt haiving lumbar back pain with electrical radiation down legs.  Seen by  , seeing PT.      Back Pain   This is a chronic problem. The current episode started more than 1 month ago. The problem occurs constantly. The problem has been gradually worsening since onset. The pain is present in the lumbar spine. The quality of the pain is described as shooting and aching. The pain radiates to the right thigh, left thigh, right foot and left foot. The pain is severe. The pain is the same all the time. The symptoms are aggravated by bending, standing, sitting and position. Stiffness is present all day. Associated symptoms include tingling. Pertinent negatives include no abdominal pain, chest pain, fever, numbness or weakness. Treatments tried: PT, NSAIDS, injection from physiatry. The treatment provided mild relief.     Review of Systems   Constitutional: Negative for fever.   Cardiovascular: Negative for chest pain.   Gastrointestinal: Negative for abdominal pain.   Musculoskeletal: Positive for back pain.   Neurological: Positive for tingling. Negative for weakness and numbness.       Objective:      Physical Exam   Constitutional: He is oriented to person, place, and time. He appears well-developed and well-nourished. No distress.   HENT:   Head: Normocephalic and atraumatic.   Pulmonary/Chest: Effort normal and breath sounds normal. No respiratory distress. He has no wheezes.   Musculoskeletal: He exhibits no tenderness.   Back brace in place.    Bilateral straight leg raise +.  No fasciculations or ttp of lumbar region     Neurological: He is alert and oriented to person, place, and time.   Skin: Skin is warm and dry. No rash noted. He is not diaphoretic. No erythema.   Nursing note and vitals reviewed.      Assessment:       1. Bilateral sciatica    2. Arthritis    3. Chronic right shoulder pain    4.  Right hip pain    5. Gastroesophageal reflux disease, esophagitis presence not specified    6. Gastroesophageal reflux disease without esophagitis        Plan:     Problem List Items Addressed This Visit        GI    Gastroesophageal reflux disease without esophagitis       Orthopedic    Right hip pain    Relevant Orders    Ambulatory Referral to Pain Clinic    Chronic right shoulder pain    Relevant Orders    Ambulatory Referral to Pain Clinic    Arthritis    Relevant Orders    Ambulatory Referral to Pain Clinic    Bilateral sciatica - Primary    Current Assessment & Plan     Pt has seen physiatry, using Phys therapy  Pain still not controlled, asking for narcotics refill.  Refer to Dr. Tsai for eval, if deemed fit by him, will have pt sign pain contract for chronic pain meds.  Seeing physiatry today         Relevant Medications    naproxen (NAPROSYN) 500 MG tablet      Other Visit Diagnoses     Gastroesophageal reflux disease, esophagitis presence not specified        Relevant Medications    pantoprazole (PROTONIX) 40 MG tablet

## 2018-04-19 NOTE — TELEPHONE ENCOUNTER
----- Message from Annie Muhammad sent at 4/19/2018  9:34 AM CDT -----  Contact: [employer]   T[facility]on called to follow up on fax that was sent over in regards to pt above pt ref number is [id number] callback number is [phone]

## 2018-04-19 NOTE — ASSESSMENT & PLAN NOTE
Pt has seen physiatry, using Phys therapy  Pain still not controlled, asking for narcotics refill.  Refer to Dr. Tsai for eval, if deemed fit by him, will have pt sign pain contract for chronic pain meds.  Seeing physiatry today

## 2018-04-23 DIAGNOSIS — K21.9 GASTROESOPHAGEAL REFLUX DISEASE, ESOPHAGITIS PRESENCE NOT SPECIFIED: ICD-10-CM

## 2018-04-23 RX ORDER — PANTOPRAZOLE SODIUM 40 MG/1
40 TABLET, DELAYED RELEASE ORAL DAILY
Qty: 30 TABLET | Refills: 11 | Status: SHIPPED | OUTPATIENT
Start: 2018-04-23 | End: 2018-05-07 | Stop reason: SDUPTHER

## 2018-04-23 NOTE — TELEPHONE ENCOUNTER
----- Message from Awilda Jamison sent at 4/23/2018  3:19 PM CDT -----  Contact: self [phone]  States that the medication (naproxen) that was filled Friday was the wrong medication. States that he needs a refill on the acid reflux medication. Pt did not know name of medication. Please call back at [phone]//thank you acc

## 2018-04-24 ENCOUNTER — TELEPHONE (OUTPATIENT)
Dept: INTERNAL MEDICINE | Facility: CLINIC | Age: 63
End: 2018-04-24

## 2018-04-24 RX ORDER — HYDROCODONE BITARTRATE AND ACETAMINOPHEN 10; 325 MG/1; MG/1
1 TABLET ORAL EVERY 12 HOURS PRN
Qty: 10 TABLET | Refills: 0 | Status: SHIPPED | OUTPATIENT
Start: 2018-04-24 | End: 2018-04-27 | Stop reason: SDUPTHER

## 2018-04-24 NOTE — TELEPHONE ENCOUNTER
----- Message from Georgette Flores sent at 4/24/2018 10:04 AM CDT -----  Contact: pt  Pt is out his medication for his back pain.     1. What is the name of the medication you are requesting? Hydrocodine  2. What is the dose? 10mg  3. How do you take the medication? Orally, topically, etc? orally  4. How often do you take this medication? Twice a day  5. Do you need a 30 day or 90 day supply? 30  6. How many refills are you requesting? ?  7. What is your preferred pharmacy and location of the pharmacy? [place]  8. Who can we contact with further questions? [phone]

## 2018-04-24 NOTE — TELEPHONE ENCOUNTER
Spoke with pt to let him know that he needs to see  for the hydrocodone Rx. Pt said that he just saw  which he did. Pt was cussing at me. I told him I would send  his med request and see what he says.     Saw  4/19/18

## 2018-04-24 NOTE — TELEPHONE ENCOUNTER
Please inform patient that it is unacceptable to speak in such a manner to our staff. I understand he is in pain but if he continues to be uncooperative, we will not be able to continue to care for him.   This has now become a chronic pain issue and he needs to come in to sign a narcotics contract. I will send refill today but he needs to see me in within the next week before I will send another prescription. If he cannot do this then he will need to seek care elsewhere.   Did he never see neurosurgery?

## 2018-04-24 NOTE — TELEPHONE ENCOUNTER
----- Message from Kaila Vaughan sent at 4/24/2018  9:28 AM CDT -----  Contact: [employer]  States she's calling to see if we received an order for a back and knee brace. Please call [employer] at [phone]. Ref# [id number] you

## 2018-04-25 ENCOUNTER — OFFICE VISIT (OUTPATIENT)
Dept: INTERNAL MEDICINE | Facility: CLINIC | Age: 63
End: 2018-04-25
Payer: MEDICARE

## 2018-04-25 VITALS
HEART RATE: 97 BPM | WEIGHT: 202.81 LBS | OXYGEN SATURATION: 96 % | HEIGHT: 65 IN | DIASTOLIC BLOOD PRESSURE: 77 MMHG | TEMPERATURE: 96 F | SYSTOLIC BLOOD PRESSURE: 123 MMHG | RESPIRATION RATE: 18 BRPM | BODY MASS INDEX: 33.79 KG/M2

## 2018-04-25 DIAGNOSIS — M54.32 LEFT SIDED SCIATICA: Primary | ICD-10-CM

## 2018-04-25 PROCEDURE — 99999 PR PBB SHADOW E&M-EST. PATIENT-LVL III: CPT | Mod: PBBFAC,,, | Performed by: FAMILY MEDICINE

## 2018-04-25 PROCEDURE — 3078F DIAST BP <80 MM HG: CPT | Mod: CPTII,S$GLB,, | Performed by: FAMILY MEDICINE

## 2018-04-25 PROCEDURE — 99214 OFFICE O/P EST MOD 30 MIN: CPT | Mod: S$GLB,,, | Performed by: FAMILY MEDICINE

## 2018-04-25 PROCEDURE — 3074F SYST BP LT 130 MM HG: CPT | Mod: CPTII,S$GLB,, | Performed by: FAMILY MEDICINE

## 2018-04-25 RX ORDER — SERTRALINE HYDROCHLORIDE 50 MG/1
50 TABLET, FILM COATED ORAL DAILY
COMMUNITY
End: 2018-05-07 | Stop reason: SDUPTHER

## 2018-04-25 RX ORDER — DULOXETIN HYDROCHLORIDE 60 MG/1
60 CAPSULE, DELAYED RELEASE ORAL DAILY
COMMUNITY
End: 2018-09-13

## 2018-04-25 NOTE — PROGRESS NOTES
Subjective:       Patient ID: Crow Green is a 62 y.o. male.    Chief Complaint: Follow-up (back pain) and Medication Refill    HPI  Crow is following up today per my request so we can make sure he is on the right track to making progress with his significant back pain and sciatica.  On March 3 of this year he had an MRI of his lumbar spine that showed significant disc extrusion of the L5-S1 segment with mass effect affecting the S1 nerve root with some bilateral foraminal narrowing.  [facility] and did have an injection but he feels that that hasn't really helped at all.  He continues to require Norco one to 2 times a day to help manage his pain.  He's been pretty miserable and also is taking gabapentin 600 mg 3 times a day along with baclofen at night.  He did artery complete a Medrol Dosepak.  He has seen physiatry who recommended a course of physical therapy which he is adhering to to the best of his ability.  He does have some transportation issues which sometimes makes it a challenge for him to make all of his appointments but he is doing his best.  In regards to his diabetes, he has been compliant with his medications and has been having better readings at home recently.    Family History   Problem Relation Age of Onset    Glaucoma Mother     Cataracts Mother     Cataracts Father     Glaucoma Sister     Cataracts Sister     Glaucoma Maternal Aunt     Prostate cancer Neg Hx        Current Outpatient Prescriptions:     ALCOHOL ANTISEPTIC PADS (ALCOHOL PREP PADS TOP), , Disp: , Rfl:     amiodarone (PACERONE) 200 MG Tab, Take 1 tablet (200 mg total) by mouth once daily., Disp: 30 tablet, Rfl: 11    amlodipine (NORVASC) 10 MG tablet, Take 1 tablet (10 mg total) by mouth once daily., Disp: 90 tablet, Rfl: 3    ammonium lactate 12 % Crea, Apply 1 Act topically 2 (two) times daily. (Patient taking differently: Apply 1 Act topically once daily. ),  Disp: 1 Tube, Rfl: 3    apixaban 5 mg Tab, Take 1 tablet (5 mg total) by mouth 2 (two) times daily., Disp: 60 tablet, Rfl: 1    aspirin (ECOTRIN) 81 MG EC tablet, Take 1 tablet (81 mg total) by mouth once daily., Disp: , Rfl: 0    atorvastatin (LIPITOR) 40 MG tablet, Take 1 tablet (40 mg total) by mouth every evening., Disp: 90 tablet, Rfl: 3    baclofen (LIORESAL) 10 MG tablet, Take 1 tablet (10 mg total) by mouth nightly as needed., Disp: 30 tablet, Rfl: 1    doxepin (SINEQUAN) 150 MG Cap, , Disp: , Rfl:     DULoxetine (CYMBALTA) 60 MG capsule, Take 60 mg by mouth once daily., Disp: , Rfl:     exenatide microspheres (BYDUREON BCISE) 2 mg/0.85 mL AtIn, Inject 2 mg into the skin every 7 days., Disp: 4 Syringe, Rfl: 11    fluticasone-salmeterol 250-50 mcg/dose (ADVAIR DISKUS) 250-50 mcg/dose diskus inhaler, Inhale 1 puff into the lungs 2 (two) times daily. Controller, Disp: 60 each, Rfl: 11    gabapentin (NEURONTIN) 600 MG tablet, Take 1 tablet (600 mg total) by mouth 3 (three) times daily., Disp: 90 tablet, Rfl: 1    glimepiride (AMARYL) 2 MG tablet, TAKE 1 TABLET BY MOUTH DAILY BEFORE BREAKFAST, Disp: 90 tablet, Rfl: 0    hydrocodone-acetaminophen 10-325mg (NORCO)  mg Tab, Take 1 tablet by mouth every 12 (twelve) hours as needed for Pain (severe pain only)., Disp: 10 tablet, Rfl: 0    insulin glargine (LANTUS SOLOSTAR) 100 unit/mL (3 mL) InPn pen, Inject 50 Units into the skin every evening. (Patient taking differently: Inject 60 Units into the skin every evening. ), Disp: 2 Box, Rfl: 11    insulin syringe-needle U-100 1 mL 31 gauge x 5/16 Syrg, , Disp: , Rfl:     latanoprost 0.005 % ophthalmic solution, Place 1 drop into both eyes every evening., Disp: 1 Bottle, Rfl: 4    lisinopril (PRINIVIL,ZESTRIL) 5 MG tablet, TAKE 1 TABLET (5 MG TOTAL) BY MOUTH ONCE DAILY., Disp: 90 tablet, Rfl: 2    metformin (GLUCOPHAGE) 1000 MG tablet, Take 1 tablet (1,000 mg total) by mouth 2 (two) times daily with  "meals., Disp: 180 tablet, Rfl: 3    metoprolol succinate (TOPROL-XL) 50 MG 24 hr tablet, TAKE 1 TABLET EVERY DAY, Disp: 90 tablet, Rfl: 5    naproxen (NAPROSYN) 500 MG tablet, Take 1 tablet (500 mg total) by mouth 2 (two) times daily with meals. To help with pain, Disp: 14 tablet, Rfl: 0    nitroGLYCERIN (NITROSTAT) 0.3 MG SL tablet, PLACE 1 TABLET UNDER THE TONGUE EVERY 5 MINUTES AS NEEDED FOR CHEST PAIN, NO MORE THAN 3 TABLETS IN ONE DAY, Disp: 100 tablet, Rfl: 0    NOVOLOG FLEXPEN 100 unit/mL InPn pen, ADMINISTER 20 UNITS UNDER THE SKIN THREE TIMES DAILY WITH MEALS, Disp: 15 mL, Rfl: 0    pantoprazole (PROTONIX) 40 MG tablet, Take 1 tablet (40 mg total) by mouth once daily., Disp: 30 tablet, Rfl: 11    pen needle, diabetic (BD INSULIN PEN NEEDLE UF SHORT) 31 gauge x 5/16" Ndle, Inject 1 each into the skin 3 (three) times daily., Disp: 90 each, Rfl: 11    predniSONE (DELTASONE) 10 MG tablet, , Disp: , Rfl:     sertraline (ZOLOFT) 50 MG tablet, Take 50 mg by mouth once daily., Disp: , Rfl:     trazodone (DESYREL) 150 MG tablet, , Disp: , Rfl:     TRUE METRIX AIR GLUCOSE METER kit, TEST FOUR TIMES DAILY BEFORE MEALS  AND EVERY NIGHT, Disp: 1 each, Rfl: 0    VENTOLIN HFA 90 mcg/actuation inhaler, , Disp: , Rfl:     tiotropium (SPIRIVA WITH HANDIHALER) 18 mcg inhalation capsule, Inhale 1 capsule (18 mcg total) into the lungs once daily. Controller, Disp: 90 capsule, Rfl: 3    Review of Systems   Constitutional: Negative for chills and fever.   Eyes: Negative for visual disturbance.   Respiratory: Negative for cough and shortness of breath.    Cardiovascular: Negative for chest pain.   Gastrointestinal: Negative for abdominal pain.   Musculoskeletal: Positive for back pain.   Neurological: Negative for dizziness.       Objective:   /77 (BP Location: Right arm, Patient Position: Sitting, BP Method: X-Large (Automatic))   Pulse 97   Temp 96.2 °F (35.7 °C) (Tympanic)   Resp 18   Ht 5' 5" (1.651 m)   " Wt 92 kg (202 lb 13.2 oz)   SpO2 96%   BMI 33.75 kg/m²      Physical Exam   Constitutional: He is oriented to person, place, and time. He appears well-developed and well-nourished.   HENT:   Head: Normocephalic and atraumatic.   Eyes: Conjunctivae are normal.   Cardiovascular: Normal rate.    Pulmonary/Chest: Effort normal. No respiratory distress.   Musculoskeletal: He exhibits no edema.   He does have a bit of a gait disturbance and different motions such as twisting and flexing triggered shooting pain down his left leg but also down his right leg at times.  During the exam encounter he would have intermittent flareups where he would wince and have to stop moving.   Neurological: He is alert and oriented to person, place, and time. Coordination normal.   Skin: Skin is warm and dry. No rash noted.   Psychiatric: He has a normal mood and affect. His behavior is normal.   Vitals reviewed.      Assessment & Plan     Problem List Items Addressed This Visit        Orthopedic    Left sided sciatica - Primary    Current Assessment & Plan     He certainly has been making efforts to improve this but is having little progress.  I recommended that [facility] and plan to have a follow-up appointment there.  He is likely going to be a surgical candidate due to the MRI results and lack of response to the injection that he had.  I recommended that he go up to 900 mg 3 times a day of the gabapentin.  He has 300 mg tablets and 600 mg tablets so I told him to take those together.  Will continue to provide him with Norco to help with severe pain.  When he is due for his next refill will give him a month's supply.  He signed a chronic drug use contract today and understands the stipulations of this.                 No Follow-up on file.

## 2018-04-27 ENCOUNTER — OFFICE VISIT (OUTPATIENT)
Dept: PODIATRY | Facility: CLINIC | Age: 63
End: 2018-04-27
Payer: MEDICARE

## 2018-04-27 ENCOUNTER — TELEPHONE (OUTPATIENT)
Dept: INTERNAL MEDICINE | Facility: CLINIC | Age: 63
End: 2018-04-27

## 2018-04-27 VITALS
DIASTOLIC BLOOD PRESSURE: 97 MMHG | BODY MASS INDEX: 33.28 KG/M2 | WEIGHT: 199.75 LBS | RESPIRATION RATE: 20 BRPM | SYSTOLIC BLOOD PRESSURE: 160 MMHG | HEART RATE: 98 BPM | HEIGHT: 65 IN

## 2018-04-27 DIAGNOSIS — E11.49 TYPE II DIABETES MELLITUS WITH NEUROLOGICAL MANIFESTATIONS: Primary | ICD-10-CM

## 2018-04-27 DIAGNOSIS — B35.1 DERMATOPHYTOSIS OF NAIL: ICD-10-CM

## 2018-04-27 PROCEDURE — 11721 DEBRIDE NAIL 6 OR MORE: CPT | Mod: Q9,S$GLB,, | Performed by: PODIATRIST

## 2018-04-27 PROCEDURE — 99499 UNLISTED E&M SERVICE: CPT | Mod: S$GLB,,, | Performed by: PODIATRIST

## 2018-04-27 PROCEDURE — 99999 PR PBB SHADOW E&M-EST. PATIENT-LVL III: CPT | Mod: PBBFAC,,, | Performed by: PODIATRIST

## 2018-04-27 RX ORDER — HYDROCODONE BITARTRATE AND ACETAMINOPHEN 10; 325 MG/1; MG/1
1 TABLET ORAL EVERY 12 HOURS PRN
Qty: 60 TABLET | Refills: 0 | Status: SHIPPED | OUTPATIENT
Start: 2018-04-27 | End: 2018-05-25 | Stop reason: SDUPTHER

## 2018-04-27 NOTE — TELEPHONE ENCOUNTER
----- Message from Neyda Mackey sent at 4/27/2018  9:53 AM CDT -----  Contact: [facility] [employer]  She is calling in regards to wanting to know if the orders were received for the Back brace and Knee brace     She can be reached at [phone]  Ref Id# [id number]

## 2018-04-27 NOTE — TELEPHONE ENCOUNTER
Refill request, pt advised rx will be sent by provider by Monday.pt voiced understanding and will not run out until Monday 4/30/18.

## 2018-04-27 NOTE — TELEPHONE ENCOUNTER
----- Message from Annie Muhammad sent at 4/27/2018  9:07 AM CDT -----  Contact: Pt   Pt states he was told to call to get a refill called in for his pain medication today hydrocodone.    ..[phone] (home) [phone] (work)      ..  [facility] [address] - [place] - [address] AT [place] & [facility]  [address] [address]  Phone: [phone] Fax: [phone]

## 2018-05-07 DIAGNOSIS — E11.69 HYPERLIPIDEMIA ASSOCIATED WITH TYPE 2 DIABETES MELLITUS: ICD-10-CM

## 2018-05-07 DIAGNOSIS — I15.2 HYPERTENSION ASSOCIATED WITH DIABETES: ICD-10-CM

## 2018-05-07 DIAGNOSIS — E11.59 HYPERTENSION ASSOCIATED WITH DIABETES: ICD-10-CM

## 2018-05-07 DIAGNOSIS — K21.9 GASTROESOPHAGEAL REFLUX DISEASE, ESOPHAGITIS PRESENCE NOT SPECIFIED: ICD-10-CM

## 2018-05-07 DIAGNOSIS — E78.5 HYPERLIPIDEMIA ASSOCIATED WITH TYPE 2 DIABETES MELLITUS: ICD-10-CM

## 2018-05-08 RX ORDER — ATORVASTATIN CALCIUM 40 MG/1
TABLET, FILM COATED ORAL
Qty: 90 TABLET | Refills: 3 | Status: SHIPPED | OUTPATIENT
Start: 2018-05-08 | End: 2019-05-09 | Stop reason: SDUPTHER

## 2018-05-08 RX ORDER — GABAPENTIN 300 MG/1
CAPSULE ORAL
Qty: 270 CAPSULE | Refills: 3 | Status: SHIPPED | OUTPATIENT
Start: 2018-05-08 | End: 2018-09-13

## 2018-05-08 RX ORDER — PANTOPRAZOLE SODIUM 40 MG/1
40 TABLET, DELAYED RELEASE ORAL DAILY
Qty: 30 TABLET | Refills: 11 | Status: SHIPPED | OUTPATIENT
Start: 2018-05-08 | End: 2018-10-09 | Stop reason: SDUPTHER

## 2018-05-08 RX ORDER — AMLODIPINE BESYLATE 10 MG/1
TABLET ORAL
Qty: 90 TABLET | Refills: 3 | Status: SHIPPED | OUTPATIENT
Start: 2018-05-08 | End: 2019-05-09 | Stop reason: SDUPTHER

## 2018-05-08 RX ORDER — SERTRALINE HYDROCHLORIDE 50 MG/1
TABLET, FILM COATED ORAL
Qty: 90 TABLET | Refills: 3 | Status: ON HOLD | OUTPATIENT
Start: 2018-05-08 | End: 2020-07-08 | Stop reason: HOSPADM

## 2018-05-14 NOTE — PROGRESS NOTES
Subjective:     Patient ID: Crow Green is a 62 y.o. male.    Chief Complaint: Nail Care (last PCP visit was 4/25/2018 with Dr. Lambert)    Crow is a 62 y.o. male who presents to the clinic for evaluation and treatment of high risk feet. Crow has a past medical history of Arthritis; Asthma; Depression; Diabetes mellitus, type 2 (Diagnosed in 2000); Elevated PSA; and Hypertension. The patient's chief complaint is long, thick toenails.  This patient has documented high risk feet requiring routine maintenance secondary to diabetes mellitis and those secondary complications of diabetes, as mentioned..    PCP: Mateo Lambert, DO    Date Last Seen by PCP: 4/25/18    Current shoe gear:  Affected Foot: Tennis shoes     Unaffected Foot: Tennis shoes    Hemoglobin A1C   Date Value Ref Range Status   02/27/2018 10.2 (H) 4.0 - 5.6 % Final     Comment:     According to ADA guidelines, hemoglobin A1c <7.0% represents  optimal control in non-pregnant diabetic patients. Different  metrics may apply to specific patient populations.   Standards of Medical Care in Diabetes-2016.  For the purpose of screening for the presence of diabetes:  <5.7%     Consistent with the absence of diabetes  5.7-6.4%  Consistent with increasing risk for diabetes   (prediabetes)  >or=6.5%  Consistent with diabetes  Currently, no consensus exists for use of hemoglobin A1c  for diagnosis of diabetes for children.  This Hemoglobin A1c assay has significant interference with fetal   hemoglobin   (HbF). The results are invalid for patients with abnormal amounts of   HbF,   including those with known Hereditary Persistence   of Fetal Hemoglobin. Heterozygous hemoglobin variants (HbAS, HbAC,   HbAD, HbAE, HbA2) do not significantly interfere with this assay;   however, presence of multiple variants in a sample may impact the %   interference.     01/03/2018 10.3 (H) 4.0 - 5.6 % Final     Comment:     According to ADA guidelines, hemoglobin A1c  <7.0% represents  optimal control in non-pregnant diabetic patients. Different  metrics may apply to specific patient populations.   Standards of Medical Care in Diabetes-2016.  For the purpose of screening for the presence of diabetes:  <5.7%     Consistent with the absence of diabetes  5.7-6.4%  Consistent with increasing risk for diabetes   (prediabetes)  >or=6.5%  Consistent with diabetes  Currently, no consensus exists for use of hemoglobin A1c  for diagnosis of diabetes for children.  This Hemoglobin A1c assay has significant interference with fetal   hemoglobin   (HbF). The results are invalid for patients with abnormal amounts of   HbF,   including those with known Hereditary Persistence   of Fetal Hemoglobin. Heterozygous hemoglobin variants (HbAS, HbAC,   HbAD, HbAE, HbA2) do not significantly interfere with this assay;   however, presence of multiple variants in a sample may impact the %   interference.     09/28/2017 9.5 (H) 4.0 - 5.6 % Final     Comment:     According to ADA guidelines, hemoglobin A1c <7.0% represents  optimal control in non-pregnant diabetic patients. Different  metrics may apply to specific patient populations.   Standards of Medical Care in Diabetes-2016.  For the purpose of screening for the presence of diabetes:  <5.7%     Consistent with the absence of diabetes  5.7-6.4%  Consistent with increasing risk for diabetes   (prediabetes)  >or=6.5%  Consistent with diabetes  Currently, no consensus exists for use of hemoglobin A1c  for diagnosis of diabetes for children.  This Hemoglobin A1c assay has significant interference with fetal   hemoglobin   (HbF). The results are invalid for patients with abnormal amounts of   HbF,   including those with known Hereditary Persistence   of Fetal Hemoglobin. Heterozygous hemoglobin variants (HbAS, HbAC,   HbAD, HbAE, HbA2) do not significantly interfere with this assay;   however, presence of multiple variants in a sample may impact the %    interference.             Patient Active Problem List   Diagnosis    ED (erectile dysfunction)    Testicular hypogonadism    Non-proliferative diabetic retinopathy, mild, both eyes    Hypertension    Diabetic polyneuropathy    Coronary artery disease involving native coronary artery without angina pectoris    Chronic combined systolic and diastolic congestive heart failure    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, with long-term current use of insulin    SANDRITA on CPAP    Asthma    Delayed sleep phase syndrome    Psychophysiological insomnia    Nightmares    Sleep related gastroesophageal reflux disease    Inadequate sleep hygiene    PAF (paroxysmal atrial fibrillation)    At risk for medication noncompliance    Obesity (BMI 30.0-34.9)    Indeterminate stage chronic open angle glaucoma    Right hip pain    Elevated erythrocyte sedimentation rate    Gait instability    Chronic right shoulder pain    Arthritis    Left sided sciatica    Dyspnea on exertion    Osteoarthritis of spine with radiculopathy, lumbar region    HNP (herniated nucleus pulposus), lumbar    Gastroesophageal reflux disease without esophagitis       Medication List with Changes/Refills   New Medications    GABAPENTIN (NEURONTIN) 300 MG CAPSULE    TAKE 1 CAPSULE THREE TIMES DAILY   Current Medications    ALCOHOL ANTISEPTIC PADS (ALCOHOL PREP PADS TOP)        AMIODARONE (PACERONE) 200 MG TAB    Take 1 tablet (200 mg total) by mouth once daily.    AMMONIUM LACTATE 12 % CREA    Apply 1 Act topically 2 (two) times daily.    APIXABAN 5 MG TAB    Take 1 tablet (5 mg total) by mouth 2 (two) times daily.    ASPIRIN (ECOTRIN) 81 MG EC TABLET    Take 1 tablet (81 mg total) by mouth once daily.    BACLOFEN (LIORESAL) 10 MG TABLET    Take 1 tablet (10 mg total) by mouth nightly as needed.    DOXEPIN (SINEQUAN) 150 MG CAP        DULOXETINE (CYMBALTA) 60 MG CAPSULE    Take 60  "mg by mouth once daily.    EXENATIDE MICROSPHERES (BYDUREON BCISE) 2 MG/0.85 ML ATIN    Inject 2 mg into the skin every 7 days.    FLUTICASONE-SALMETEROL 250-50 MCG/DOSE (ADVAIR DISKUS) 250-50 MCG/DOSE DISKUS INHALER    Inhale 1 puff into the lungs 2 (two) times daily. Controller    GABAPENTIN (NEURONTIN) 600 MG TABLET    Take 1 tablet (600 mg total) by mouth 3 (three) times daily.    GLIMEPIRIDE (AMARYL) 2 MG TABLET    TAKE 1 TABLET BY MOUTH DAILY BEFORE BREAKFAST    HYDROCODONE-ACETAMINOPHEN 10-325MG (NORCO)  MG TAB    Take 1 tablet by mouth every 12 (twelve) hours as needed for Pain (severe pain only).    INSULIN GLARGINE (LANTUS SOLOSTAR) 100 UNIT/ML (3 ML) INPN PEN    Inject 50 Units into the skin every evening.    INSULIN SYRINGE-NEEDLE U-100 1 ML 31 GAUGE X 5/16 SYRG        LATANOPROST 0.005 % OPHTHALMIC SOLUTION    Place 1 drop into both eyes every evening.    LISINOPRIL (PRINIVIL,ZESTRIL) 5 MG TABLET    TAKE 1 TABLET (5 MG TOTAL) BY MOUTH ONCE DAILY.    METFORMIN (GLUCOPHAGE) 1000 MG TABLET    Take 1 tablet (1,000 mg total) by mouth 2 (two) times daily with meals.    METOPROLOL SUCCINATE (TOPROL-XL) 50 MG 24 HR TABLET    TAKE 1 TABLET EVERY DAY    NAPROXEN (NAPROSYN) 500 MG TABLET    Take 1 tablet (500 mg total) by mouth 2 (two) times daily with meals. To help with pain    NITROGLYCERIN (NITROSTAT) 0.3 MG SL TABLET    PLACE 1 TABLET UNDER THE TONGUE EVERY 5 MINUTES AS NEEDED FOR CHEST PAIN, NO MORE THAN 3 TABLETS IN ONE DAY    NOVOLOG FLEXPEN 100 UNIT/ML INPN PEN    ADMINISTER 20 UNITS UNDER THE SKIN THREE TIMES DAILY WITH MEALS    PEN NEEDLE, DIABETIC (BD INSULIN PEN NEEDLE UF SHORT) 31 GAUGE X 5/16" NDLE    Inject 1 each into the skin 3 (three) times daily.    PREDNISONE (DELTASONE) 10 MG TABLET        TIOTROPIUM (SPIRIVA WITH HANDIHALER) 18 MCG INHALATION CAPSULE    Inhale 1 capsule (18 mcg total) into the lungs once daily. Controller    TRAZODONE (DESYREL) 150 MG TABLET        TRUE METRIX AIR " "GLUCOSE METER KIT    TEST FOUR TIMES DAILY BEFORE MEALS  AND EVERY NIGHT    VENTOLIN HFA 90 MCG/ACTUATION INHALER       Changed and/or Refilled Medications    Modified Medication Previous Medication    AMLODIPINE (NORVASC) 10 MG TABLET amlodipine (NORVASC) 10 MG tablet       TAKE 1 TABLET ONE TIME DAILY    Take 1 tablet (10 mg total) by mouth once daily.    ATORVASTATIN (LIPITOR) 40 MG TABLET atorvastatin (LIPITOR) 40 MG tablet       TAKE 1 TABLET EVERY EVENING    Take 1 tablet (40 mg total) by mouth every evening.    PANTOPRAZOLE (PROTONIX) 40 MG TABLET pantoprazole (PROTONIX) 40 MG tablet       Take 1 tablet (40 mg total) by mouth once daily.    Take 1 tablet (40 mg total) by mouth once daily.    SERTRALINE (ZOLOFT) 50 MG TABLET sertraline (ZOLOFT) 50 MG tablet       TAKE 1 TABLET ONE TIME DAILY    Take 50 mg by mouth once daily.       Review of patient's allergies indicates:  No Known Allergies    Past Surgical History:   Procedure Laterality Date    CHOLECYSTECTOMY         Family History   Problem Relation Age of Onset    Glaucoma Mother     Cataracts Mother     Cataracts Father     Glaucoma Sister     Cataracts Sister     Glaucoma Maternal Aunt     Prostate cancer Neg Hx        Social History     Social History    Marital status: Legally      Spouse name: N/A    Number of children: 5    Years of education: N/A     Occupational History    disabled      Social History Main Topics    Smoking status: Never Smoker    Smokeless tobacco: Never Used    Alcohol use No    Drug use: No    Sexual activity: Not Currently     Other Topics Concern    Not on file     Social History Narrative    No narrative on file       Vitals:    04/27/18 0952   BP: (!) 160/97   Pulse: 98   Resp: 20   Weight: 90.6 kg (199 lb 11.8 oz)   Height: 5' 5" (1.651 m)   PainSc: 0-No pain       Hemoglobin A1C   Date Value Ref Range Status   02/27/2018 10.2 (H) 4.0 - 5.6 % Final     Comment:     According to ADA guidelines, " hemoglobin A1c <7.0% represents  optimal control in non-pregnant diabetic patients. Different  metrics may apply to specific patient populations.   Standards of Medical Care in Diabetes-2016.  For the purpose of screening for the presence of diabetes:  <5.7%     Consistent with the absence of diabetes  5.7-6.4%  Consistent with increasing risk for diabetes   (prediabetes)  >or=6.5%  Consistent with diabetes  Currently, no consensus exists for use of hemoglobin A1c  for diagnosis of diabetes for children.  This Hemoglobin A1c assay has significant interference with fetal   hemoglobin   (HbF). The results are invalid for patients with abnormal amounts of   HbF,   including those with known Hereditary Persistence   of Fetal Hemoglobin. Heterozygous hemoglobin variants (HbAS, HbAC,   HbAD, HbAE, HbA2) do not significantly interfere with this assay;   however, presence of multiple variants in a sample may impact the %   interference.     01/03/2018 10.3 (H) 4.0 - 5.6 % Final     Comment:     According to ADA guidelines, hemoglobin A1c <7.0% represents  optimal control in non-pregnant diabetic patients. Different  metrics may apply to specific patient populations.   Standards of Medical Care in Diabetes-2016.  For the purpose of screening for the presence of diabetes:  <5.7%     Consistent with the absence of diabetes  5.7-6.4%  Consistent with increasing risk for diabetes   (prediabetes)  >or=6.5%  Consistent with diabetes  Currently, no consensus exists for use of hemoglobin A1c  for diagnosis of diabetes for children.  This Hemoglobin A1c assay has significant interference with fetal   hemoglobin   (HbF). The results are invalid for patients with abnormal amounts of   HbF,   including those with known Hereditary Persistence   of Fetal Hemoglobin. Heterozygous hemoglobin variants (HbAS, HbAC,   HbAD, HbAE, HbA2) do not significantly interfere with this assay;   however, presence of multiple variants in a sample may  impact the %   interference.     09/28/2017 9.5 (H) 4.0 - 5.6 % Final     Comment:     According to ADA guidelines, hemoglobin A1c <7.0% represents  optimal control in non-pregnant diabetic patients. Different  metrics may apply to specific patient populations.   Standards of Medical Care in Diabetes-2016.  For the purpose of screening for the presence of diabetes:  <5.7%     Consistent with the absence of diabetes  5.7-6.4%  Consistent with increasing risk for diabetes   (prediabetes)  >or=6.5%  Consistent with diabetes  Currently, no consensus exists for use of hemoglobin A1c  for diagnosis of diabetes for children.  This Hemoglobin A1c assay has significant interference with fetal   hemoglobin   (HbF). The results are invalid for patients with abnormal amounts of   HbF,   including those with known Hereditary Persistence   of Fetal Hemoglobin. Heterozygous hemoglobin variants (HbAS, HbAC,   HbAD, HbAE, HbA2) do not significantly interfere with this assay;   however, presence of multiple variants in a sample may impact the %   interference.         Review of Systems   Constitutional: Negative for chills and fever.   Respiratory: Negative for shortness of breath.    Cardiovascular: Negative for chest pain, palpitations, orthopnea, claudication and leg swelling.   Gastrointestinal: Negative for diarrhea, nausea and vomiting.   Musculoskeletal: Negative for joint pain.   Skin: Negative for rash.   Neurological: Positive for tingling and sensory change. Negative for dizziness, focal weakness and weakness.   Psychiatric/Behavioral: Negative.          Objective:      PHYSICAL EXAM: Apperance: Alert and orient in no distress,well developed, and with good attention to grooming and body habits  Patient presents ambulating in tennis shoes.   LOWER EXTREMITY EXAM:  VASCULAR: Dorsalis pedis pulses 2/4 bilateral and Posterior Tibial pulses 2/4 bilateral. Capillary fill time <3 seconds bilateral. No edema observed bilateral.  Varicosities absent bilateral. Skin temperature of the lower extremities is warm to warm, proximal to distal. Hair growth dim bilateral.  DERMATOLOGICAL: No skin rashes, subcutaneous nodules, lesions, or ulcers observed bilateral. Nails 1,2,3,4,5 bilateral elongated, thickened, and discolored with subungual debris. Webspaces 1,2,3,4clean, dry and without evidence of break in skin integrity bilateral. Dry and scaly skin noted plantarly bilateral.   NEUROLOGICAL: Light touch, sharp-dull, proprioception all present and equal bilaterally.  Vibratory sensation diminished at bilateral hallux and navicular. Protective sensation absent at sites as tested with a [place]-Kimmie 5.07 monofilament.   MUSCULOSKELETAL: Muscle strength is 5/5 for foot inverters, everters, plantarflexors, and dorsiflexors. Muscle tone is normal.         Assessment:       Encounter Diagnoses   Name Primary?    Type II diabetes mellitus with neurological manifestations Yes    Dermatophytosis of nail          Plan:   Type II diabetes mellitus with neurological manifestations    Dermatophytosis of nail      I counseled the patient on his conditions, regarding findings of my examination, my impressions, and usual treatment plan.   Shoe inspection. Diabetic Foot Education. Patient reminded of the importance of good nutrition and blood sugar control to help prevent podiatric complications of diabetes. Patient instructed on proper foot hygeine. We discussed wearing proper shoe gear, daily foot inspections, never walking without protective shoe gear, never putting sharp instruments to feet.    With patient's permission, nails 1-5 bilateral were debrided/trimmed in length and thickness aggressively to their soft tissue attachment mechanically and with electric , removing all offending nail and debris. Patient relates relief following the procedure.  Patient  will continue to monitor the areas daily, inspect feet, wear protective shoe gear when  ambulatory, moisturizer to maintain skin integrity. Patient reminded of the importance of good nutrition and blood sugar control to help prevent podiatric complications of diabetes.  Patient to return 3 months or sooner if needed.              Barb Veras DPM  [facility]

## 2018-05-15 ENCOUNTER — TELEPHONE (OUTPATIENT)
Dept: INTERNAL MEDICINE | Facility: CLINIC | Age: 63
End: 2018-05-15

## 2018-05-15 NOTE — TELEPHONE ENCOUNTER
----- Message from Dary Martinez sent at 5/15/2018  4:44 PM CDT -----  Contact: Taz/[facility]  Taz called to check on the status of the back and knee brace request that was faxed over on 5/15/18. He can be contacted at [phone]. Ref ID# [id number]    Thanks,  Dary

## 2018-05-15 NOTE — TELEPHONE ENCOUNTER
Call returned to [place] with [facility]. Representative advised pt has to schedule appt for requested services.

## 2018-05-16 ENCOUNTER — TELEPHONE (OUTPATIENT)
Dept: INTERNAL MEDICINE | Facility: CLINIC | Age: 63
End: 2018-05-16

## 2018-05-16 DIAGNOSIS — I50.42 CHRONIC COMBINED SYSTOLIC AND DIASTOLIC CONGESTIVE HEART FAILURE: Primary | ICD-10-CM

## 2018-05-16 NOTE — TELEPHONE ENCOUNTER
----- Message from Jane Castelan sent at 5/16/2018  2:10 PM CDT -----  Contact: Pt.   Pt is requesting Nurse to come to home to assist with medications #[phone] [employer]

## 2018-05-23 ENCOUNTER — OFFICE VISIT (OUTPATIENT)
Dept: INTERNAL MEDICINE | Facility: CLINIC | Age: 63
End: 2018-05-23
Payer: MEDICARE

## 2018-05-23 VITALS
HEART RATE: 104 BPM | OXYGEN SATURATION: 95 % | BODY MASS INDEX: 34.46 KG/M2 | SYSTOLIC BLOOD PRESSURE: 148 MMHG | RESPIRATION RATE: 20 BRPM | HEIGHT: 65 IN | TEMPERATURE: 97 F | WEIGHT: 206.81 LBS | DIASTOLIC BLOOD PRESSURE: 74 MMHG

## 2018-05-23 DIAGNOSIS — I50.42 CHRONIC COMBINED SYSTOLIC AND DIASTOLIC CONGESTIVE HEART FAILURE: ICD-10-CM

## 2018-05-23 DIAGNOSIS — M54.32 LEFT SIDED SCIATICA: Primary | ICD-10-CM

## 2018-05-23 PROCEDURE — 99999 PR PBB SHADOW E&M-EST. PATIENT-LVL IV: CPT | Mod: PBBFAC,,, | Performed by: FAMILY MEDICINE

## 2018-05-23 PROCEDURE — 3078F DIAST BP <80 MM HG: CPT | Mod: CPTII,S$GLB,, | Performed by: FAMILY MEDICINE

## 2018-05-23 PROCEDURE — 3077F SYST BP >= 140 MM HG: CPT | Mod: CPTII,S$GLB,, | Performed by: FAMILY MEDICINE

## 2018-05-23 PROCEDURE — 3008F BODY MASS INDEX DOCD: CPT | Mod: CPTII,S$GLB,, | Performed by: FAMILY MEDICINE

## 2018-05-23 PROCEDURE — 99214 OFFICE O/P EST MOD 30 MIN: CPT | Mod: S$GLB,,, | Performed by: FAMILY MEDICINE

## 2018-05-23 PROCEDURE — 3046F HEMOGLOBIN A1C LEVEL >9.0%: CPT | Mod: CPTII,S$GLB,, | Performed by: FAMILY MEDICINE

## 2018-05-23 RX ORDER — POTASSIUM CHLORIDE 750 MG/1
10 TABLET, EXTENDED RELEASE ORAL ONCE
COMMUNITY
End: 2018-09-13 | Stop reason: SDUPTHER

## 2018-05-23 RX ORDER — FUROSEMIDE 80 MG/1
TABLET ORAL
Refills: 0 | COMMUNITY
Start: 2018-05-16 | End: 2018-08-17 | Stop reason: DRUGHIGH

## 2018-05-23 NOTE — ASSESSMENT & PLAN NOTE
Symptoms have improved.  Sending new referral for physical therapy slightly can go to some place closer.

## 2018-05-23 NOTE — ASSESSMENT & PLAN NOTE
Continues to have very poorly controlled diabetes.  I really did not have good insight into where the problem is as he reports he is taking his medication.  I asked him to keep a very detailed journal of what he eats and drinks every day, his glucose levels and how much insulin he is getting 24 hr a day 7 days a week for the next 2 weeks.  Having him follow up at that time so I can evaluate this detail journal to know where the problem lies

## 2018-05-23 NOTE — PROGRESS NOTES
Subjective:       Patient ID: Crow Green is a 62 y.o. male.    Chief Complaint: Follow-up (OLOL- SOB)    AJ Maria is here today to follow-up from recent hospitalization at [facility].  He presented there and was found to have acute decompensation of congestive heart failure.  He had a decreased ejection fraction in the 30s.  He was started on Lasix of 80 mg twice a day and also put on potassium.  Other medications were kept the same.  When he presented to the hospital his hemoglobin A1c was 11.1.  Initial glucose level was 465 but it was kept well controlled while he was in the hospital.  He says he is doing okay now.  Not having any shortness of breath or chest pain.  Not having any lower extremity edema. We recently arranged for him to have home health to visit him at least once a week to help with medication management issues.  He says that he is compliant with his insulin and has been trying to improve his diet but get his diabetes continues to be poorly controlled.  Reports that is sciatica back pain is a little bit better.  He has already had 1 injection and will be returning next month to have a follow-up appointment.  He did start physical therapy but he was having some difficulty with transportation issues because he only has a certain amount of transports during the year with his insurance.  He is requesting to have his therapy referral sent to another location that way he can not consume all of his transportation allotment    Family History   Problem Relation Age of Onset    Glaucoma Mother     Cataracts Mother     Cataracts Father     Glaucoma Sister     Cataracts Sister     Glaucoma Maternal Aunt     Prostate cancer Neg Hx        Current Outpatient Prescriptions:     ALCOHOL ANTISEPTIC PADS (ALCOHOL PREP PADS TOP), , Disp: , Rfl:     amiodarone (PACERONE) 200 MG Tab, Take 1 tablet (200 mg total) by mouth once daily., Disp: 30 tablet, Rfl: 11    amLODIPine (NORVASC) 10  MG tablet, TAKE 1 TABLET ONE TIME DAILY, Disp: 90 tablet, Rfl: 3    ammonium lactate 12 % Crea, Apply 1 Act topically 2 (two) times daily. (Patient taking differently: Apply 1 Act topically once daily. ), Disp: 1 Tube, Rfl: 3    apixaban 5 mg Tab, Take 1 tablet (5 mg total) by mouth 2 (two) times daily., Disp: 60 tablet, Rfl: 1    aspirin (ECOTRIN) 81 MG EC tablet, Take 1 tablet (81 mg total) by mouth once daily., Disp: , Rfl: 0    atorvastatin (LIPITOR) 40 MG tablet, TAKE 1 TABLET EVERY EVENING, Disp: 90 tablet, Rfl: 3    baclofen (LIORESAL) 10 MG tablet, Take 1 tablet (10 mg total) by mouth nightly as needed., Disp: 30 tablet, Rfl: 1    doxepin (SINEQUAN) 150 MG Cap, , Disp: , Rfl:     DULoxetine (CYMBALTA) 60 MG capsule, Take 60 mg by mouth once daily., Disp: , Rfl:     exenatide microspheres (BYDURE[employer]SE) 2 mg/0.85 mL AtIn, Inject 2 mg into the skin every 7 days., Disp: 4 Syringe, Rfl: 11    fluticasone-salmeterol 250-50 mcg/dose (ADVAIR DISKUS) 250-50 mcg/dose diskus inhaler, Inhale 1 puff into the lungs 2 (two) times daily. Controller, Disp: 60 each, Rfl: 11    furosemide (LASIX) 80 MG tablet, TK 1 T PO BID, Disp: , Rfl: 0    gabapentin (NEURONTIN) 300 MG capsule, TAKE 1 CAPSULE THREE TIMES DAILY, Disp: 270 capsule, Rfl: 3    gabapentin (NEURONTIN) 600 MG tablet, Take 1 tablet (600 mg total) by mouth 3 (three) times daily., Disp: 90 tablet, Rfl: 1    glimepiride (AMARYL) 2 MG tablet, TAKE 1 TABLET BY MOUTH DAILY BEFORE BREAKFAST, Disp: 90 tablet, Rfl: 0    hydrocodone-acetaminophen 10-325mg (NORCO)  mg Tab, Take 1 tablet by mouth every 12 (twelve) hours as needed for Pain (severe pain only)., Disp: 60 tablet, Rfl: 0    insulin glargine (LANTUS SOLOSTAR) 100 unit/mL (3 mL) InPn pen, Inject 50 Units into the skin every evening. (Patient taking differently: Inject 60 Units into the skin every evening. ), Disp: 2 Box, Rfl: 11    insulin syringe-needle U-100 1 mL 31 gauge x 5/16 Syrg, ,  "Disp: , Rfl:     latanoprost 0.005 % ophthalmic solution, Place 1 drop into both eyes every evening., Disp: 1 Bottle, Rfl: 4    lisinopril (PRINIVIL,ZESTRIL) 5 MG tablet, TAKE 1 TABLET (5 MG TOTAL) BY MOUTH ONCE DAILY., Disp: 90 tablet, Rfl: 2    metformin (GLUCOPHAGE) 1000 MG tablet, Take 1 tablet (1,000 mg total) by mouth 2 (two) times daily with meals., Disp: 180 tablet, Rfl: 3    metoprolol succinate (TOPROL-XL) 50 MG 24 hr tablet, TAKE 1 TABLET EVERY DAY, Disp: 90 tablet, Rfl: 5    naproxen (NAPROSYN) 500 MG tablet, Take 1 tablet (500 mg total) by mouth 2 (two) times daily with meals. To help with pain, Disp: 14 tablet, Rfl: 0    nitroGLYCERIN (NITROSTAT) 0.3 MG SL tablet, PLACE 1 TABLET UNDER THE TONGUE EVERY 5 MINUTES AS NEEDED FOR CHEST PAIN, NO MORE THAN 3 TABLETS IN ONE DAY, Disp: 100 tablet, Rfl: 0    NOVOLOG FLEXPEN 100 unit/mL InPn pen, ADMINISTER 20 UNITS UNDER THE SKIN THREE TIMES DAILY WITH MEALS, Disp: 15 mL, Rfl: 0    pantoprazole (PROTONIX) 40 MG tablet, Take 1 tablet (40 mg total) by mouth once daily., Disp: 30 tablet, Rfl: 11    pen needle, diabetic (BD INSULIN PEN NEEDLE UF SHORT) 31 gauge x 5/16" Ndle, Inject 1 each into the skin 3 (three) times daily., Disp: 90 each, Rfl: 11    potassium chloride (KLOR-CON) 10 MEQ TbSR, Take 10 mEq by mouth once., Disp: , Rfl:     sertraline (ZOLOFT) 50 MG tablet, TAKE 1 TABLET ONE TIME DAILY, Disp: 90 tablet, Rfl: 3    trazodone (DESYREL) 150 MG tablet, , Disp: , Rfl:     TRUE METRIX AIR GLUCOSE METER kit, TEST FOUR TIMES DAILY BEFORE MEALS  AND EVERY NIGHT, Disp: 1 each, Rfl: 0    VENTOLIN HFA 90 mcg/actuation inhaler, , Disp: , Rfl:     tiotropium (SPIRIVA WITH HANDIHALER) 18 mcg inhalation capsule, Inhale 1 capsule (18 mcg total) into the lungs once daily. Controller, Disp: 90 capsule, Rfl: 3    Review of Systems   Constitutional: Negative for chills and fever.   Eyes: Negative for visual disturbance.   Respiratory: Negative for cough and " "shortness of breath.    Cardiovascular: Negative for chest pain.   Gastrointestinal: Negative for abdominal pain.   Neurological: Negative for dizziness.       Objective:   BP (!) 148/74 (BP Location: Left arm, Patient Position: Sitting, BP Method: X-Large (Manual))   Pulse 104   Temp 96.8 °F (36 °C) (Tympanic)   Resp 20   Ht 5' 5" (1.651 m)   Wt 93.8 kg (206 lb 12.7 oz)   SpO2 95%   BMI 34.41 kg/m²      Physical Exam   Constitutional: He is oriented to person, place, and time. He appears well-developed and well-nourished.   HENT:   Head: Normocephalic and atraumatic.   Eyes: Conjunctivae are normal.   Cardiovascular: Normal rate.    Pulmonary/Chest: Effort normal. No respiratory distress.   Musculoskeletal: He exhibits no edema.   Neurological: He is alert and oriented to person, place, and time. Coordination normal.   Skin: Skin is warm and dry. No rash noted.   Psychiatric: He has a normal mood and affect. His behavior is normal.   Vitals reviewed.      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  I really did not have good insight into where the problem is as he reports he is taking his medication.  I asked him to keep a very detailed journal of what he eats and drinks every day, his glucose levels and how much insulin he is getting 24 hr a day 7 days a week for the next 2 weeks.  Having him follow up at that time so I can evaluate this detail journal to know where the problem lies            Cardiac/Vascular    Chronic combined systolic and diastolic congestive heart failure    Current Assessment & Plan     Symptoms stable at this time.  Continue same medications along with the new medication of Lasix.  Counseled him on avoiding salt and taking daily " weights.         Relevant Orders    Ambulatory referral to Cardiology       Orthopedic    Left sided sciatica - Primary    Current Assessment & Plan     Symptoms have improved.  Sending new referral for physical therapy slightly can go to some place closer.         Relevant Orders    Ambulatory Referral to Physical/Occupational Therapy            Follow-up in about 2 weeks (around 6/6/2018) for Diabetes.

## 2018-05-23 NOTE — ASSESSMENT & PLAN NOTE
Symptoms stable at this time.  Continue same medications along with the new medication of Lasix.  Counseled him on avoiding salt and taking daily weights.

## 2018-05-24 ENCOUNTER — TELEPHONE (OUTPATIENT)
Dept: INTERNAL MEDICINE | Facility: CLINIC | Age: 63
End: 2018-05-24

## 2018-05-24 DIAGNOSIS — Z91.89 AT RISK FOR MEDICATION NONCOMPLIANCE: Primary | ICD-10-CM

## 2018-05-24 NOTE — TELEPHONE ENCOUNTER
Order was placed earlier this week and [facility] sent me a message said that they were working on it.

## 2018-05-24 NOTE — TELEPHONE ENCOUNTER
----- Message from Kimmie Natarajan sent at 5/24/2018 12:37 PM CDT -----  Pt at [phone]//states he is needing an order for home health//he needs help with his medications//to fill his pill container//please call//feliz/francisco

## 2018-05-24 NOTE — TELEPHONE ENCOUNTER
Perhaps a referral to case management could help because he has had medication management issues in the past

## 2018-05-24 NOTE — TELEPHONE ENCOUNTER
I spoke with [facility]. He is not eligible for home health because he is not homebound. He goes to outpatient physical therapy which makes him ineligible.

## 2018-05-25 ENCOUNTER — TELEPHONE (OUTPATIENT)
Dept: INTERNAL MEDICINE | Facility: CLINIC | Age: 63
End: 2018-05-25

## 2018-05-25 ENCOUNTER — OUTPATIENT CASE MANAGEMENT (OUTPATIENT)
Dept: ADMINISTRATIVE | Facility: OTHER | Age: 63
End: 2018-05-25

## 2018-05-25 RX ORDER — HYDROCODONE BITARTRATE AND ACETAMINOPHEN 10; 325 MG/1; MG/1
1 TABLET ORAL EVERY 12 HOURS PRN
Qty: 60 TABLET | Refills: 0 | Status: SHIPPED | OUTPATIENT
Start: 2018-05-25 | End: 2018-06-27 | Stop reason: SDUPTHER

## 2018-05-25 NOTE — TELEPHONE ENCOUNTER
----- Message from Skylar Jose Eduardo sent at 5/25/2018  8:55 AM CDT -----  Contact: pt  1. What is the name of the medication you are requesting? Hydrocodone  2. What is the dose? 10mg  3. How do you take the medication? Orally, topically, etc? orally  4. How often do you take this medication? 2xday  5. Do you need a 30 day or 90 day supply? 30  6. How many refills are you requesting? 1  7. What is your preferred pharmacy and location of the pharmacy? [facility]/[address]  8. Who can we contact with further questions? [phone]

## 2018-05-25 NOTE — TELEPHONE ENCOUNTER
Spoke with pt and he wanted to know if the hydrocodone Rx was sent to pharmacy. I told him it was sent today. To check with his pharmacy. Pt verbalized understanding

## 2018-05-25 NOTE — TELEPHONE ENCOUNTER
----- Message from Shamika Mckeon sent at 5/25/2018  2:05 PM CDT -----  Contact: pt  He's calling in regards to, rx being sent over to pharm pls call pt back at [phone] (home) [phone] (work)        [facility] [address] [place] [address] [facility] - [address] AT [facility]  [address] [place] [address]  Phone: [phone] Fax: [phone]

## 2018-05-25 NOTE — PROGRESS NOTES
Please note the following patient has been assigned to Laila Martinez RN in Outpatient Case Management for Diabetes Disease Management with a hbA1C greater than 10.0.    Please contact [facility] at Ext. [phone] with any questions.    Thank you,    Gabrielle Farrar LMSW

## 2018-05-28 DIAGNOSIS — M54.31 BILATERAL SCIATICA: ICD-10-CM

## 2018-05-28 DIAGNOSIS — M54.32 BILATERAL SCIATICA: ICD-10-CM

## 2018-05-28 RX ORDER — NAPROXEN 500 MG/1
500 TABLET ORAL 2 TIMES DAILY WITH MEALS
Qty: 14 TABLET | Refills: 0 | Status: SHIPPED | OUTPATIENT
Start: 2018-05-28 | End: 2018-09-13

## 2018-06-01 ENCOUNTER — OUTPATIENT CASE MANAGEMENT (OUTPATIENT)
Dept: ADMINISTRATIVE | Facility: OTHER | Age: 63
End: 2018-06-01

## 2018-06-01 NOTE — PROGRESS NOTES
6/1  1st Attempt to complete initial assessment for Outpatient Care Management; Patient son answered phone and states his father is in the hospital at [facility]. This OPCM will follow up.

## 2018-06-05 ENCOUNTER — OUTPATIENT CASE MANAGEMENT (OUTPATIENT)
Dept: ADMINISTRATIVE | Facility: OTHER | Age: 63
End: 2018-06-05

## 2018-06-07 ENCOUNTER — TELEPHONE (OUTPATIENT)
Dept: INTERNAL MEDICINE | Facility: CLINIC | Age: 63
End: 2018-06-07

## 2018-06-07 ENCOUNTER — OUTPATIENT CASE MANAGEMENT (OUTPATIENT)
Dept: ADMINISTRATIVE | Facility: OTHER | Age: 63
End: 2018-06-07

## 2018-06-07 NOTE — PROGRESS NOTES
6/7  -- 3rd Attempt to complete initial assessment for Outpatient Care Management; As this is my third unsuccessful attempt to complete initial assessment for OPCM,I will close case.  I will mail a letter with my contact information.

## 2018-06-07 NOTE — LETTER
June 7, 2018    Crow FISCHER Norma  [address]  [facility] [address]             [facility] [address] [place] [address] Dear Mr. Green,          I work with [facility] Outpatient Case Management Department. We received a referral to call you to discuss your medical history.These services are free of charge and are offered to [facility] patients who have recently been discharged from any of our facilities or who have complex medical conditions that may require the skill of a nurse to assist with management.             I am a Registered Nurse who specializes in connecting patients with available medical and financial resources as well as addressing any educational needs that may be indicated.      I attempted to reach you by telephone, but I was unsuccessful. Please call our department so that we can go over some questions with you regarding your health.    The Outpatient Case Management Department can be reached at [phone] from 8:00AM to 4:30 PM on Monday thru Friday. [facility] also has a program where a nurse is available 24/7 to answer questions or provide medical advice, their number is [phone].    Thanks,    Laila Martinez, MAYNOR   Outpatient Complex Case Management

## 2018-06-07 NOTE — TELEPHONE ENCOUNTER
----- Message from Awilda Jamison sent at 6/7/2018  2:04 PM CDT -----  Contact: self [phone]  States that he is calling to let nurse know that is has been discharged from hosp. Please call back at [phone]//thank you acc

## 2018-06-08 RX ORDER — GLIMEPIRIDE 2 MG/1
TABLET ORAL
Qty: 90 TABLET | Refills: 0 | Status: SHIPPED | OUTPATIENT
Start: 2018-06-08 | End: 2018-06-14

## 2018-06-12 ENCOUNTER — TELEPHONE (OUTPATIENT)
Dept: INTERNAL MEDICINE | Facility: CLINIC | Age: 63
End: 2018-06-12

## 2018-06-12 NOTE — TELEPHONE ENCOUNTER
----- Message from Jane Castelan sent at 6/12/2018  3:01 PM CDT -----  Contact: Jose Pharmacist   Calling regarding to fax for fildenafil. Pharmacist stated that she faxed it 4 time and is still waiting on an approval  or denial . [phone]

## 2018-06-13 ENCOUNTER — TELEPHONE (OUTPATIENT)
Dept: INTERNAL MEDICINE | Facility: CLINIC | Age: 63
End: 2018-06-13

## 2018-06-13 NOTE — TELEPHONE ENCOUNTER
----- Message from Neyda Bashir sent at 6/13/2018  9:02 AM CDT -----  Contact: Mojgan/[facility]  Mojgan stated that she fax over orders for Home Health certification and she has not gotting any reply back, Please call her at [phone].    Thanks  Td

## 2018-06-13 NOTE — TELEPHONE ENCOUNTER
Nurse returned call to Moj[facility]. [facility] informed paperwork was signed as of 6/12/18 and will be sent over [phone]

## 2018-06-13 NOTE — TELEPHONE ENCOUNTER
Pt called stating he's experiencing SOB, Chest pain with Ha's. Pt denies any nausea/vomiting, blurred vision and weakness. Nurse advised pt to report to the nearest ER for evaluation. Pt voiced understanding.

## 2018-06-14 ENCOUNTER — OFFICE VISIT (OUTPATIENT)
Dept: INTERNAL MEDICINE | Facility: CLINIC | Age: 63
End: 2018-06-14
Payer: MEDICARE

## 2018-06-14 VITALS
TEMPERATURE: 97 F | SYSTOLIC BLOOD PRESSURE: 144 MMHG | OXYGEN SATURATION: 90 % | BODY MASS INDEX: 34.82 KG/M2 | DIASTOLIC BLOOD PRESSURE: 82 MMHG | WEIGHT: 209 LBS | HEART RATE: 91 BPM | RESPIRATION RATE: 19 BRPM | HEIGHT: 65 IN

## 2018-06-14 DIAGNOSIS — I50.42 CHRONIC COMBINED SYSTOLIC AND DIASTOLIC CONGESTIVE HEART FAILURE: Primary | ICD-10-CM

## 2018-06-14 DIAGNOSIS — M54.32 LEFT SIDED SCIATICA: ICD-10-CM

## 2018-06-14 DIAGNOSIS — R06.09 DYSPNEA ON EXERTION: ICD-10-CM

## 2018-06-14 PROCEDURE — 3046F HEMOGLOBIN A1C LEVEL >9.0%: CPT | Mod: CPTII,S$GLB,, | Performed by: FAMILY MEDICINE

## 2018-06-14 PROCEDURE — 3079F DIAST BP 80-89 MM HG: CPT | Mod: CPTII,S$GLB,, | Performed by: FAMILY MEDICINE

## 2018-06-14 PROCEDURE — 99214 OFFICE O/P EST MOD 30 MIN: CPT | Mod: S$GLB,,, | Performed by: FAMILY MEDICINE

## 2018-06-14 PROCEDURE — 3077F SYST BP >= 140 MM HG: CPT | Mod: CPTII,S$GLB,, | Performed by: FAMILY MEDICINE

## 2018-06-14 PROCEDURE — 3008F BODY MASS INDEX DOCD: CPT | Mod: CPTII,S$GLB,, | Performed by: FAMILY MEDICINE

## 2018-06-14 PROCEDURE — 99999 PR PBB SHADOW E&M-EST. PATIENT-LVL III: CPT | Mod: PBBFAC,,, | Performed by: FAMILY MEDICINE

## 2018-06-14 RX ORDER — LISINOPRIL 10 MG/1
10 TABLET ORAL
COMMUNITY
Start: 2018-06-08 | End: 2018-09-13 | Stop reason: SDUPTHER

## 2018-06-14 RX ORDER — SPIRONOLACTONE 25 MG/1
25 TABLET ORAL DAILY
Qty: 30 TABLET | Refills: 11 | Status: SHIPPED | OUTPATIENT
Start: 2018-06-14 | End: 2018-06-15 | Stop reason: ALTCHOICE

## 2018-06-15 ENCOUNTER — OFFICE VISIT (OUTPATIENT)
Dept: CARDIOLOGY | Facility: CLINIC | Age: 63
End: 2018-06-15
Payer: MEDICARE

## 2018-06-15 VITALS
HEART RATE: 88 BPM | WEIGHT: 209 LBS | SYSTOLIC BLOOD PRESSURE: 120 MMHG | DIASTOLIC BLOOD PRESSURE: 86 MMHG | BODY MASS INDEX: 34.82 KG/M2 | HEIGHT: 65 IN

## 2018-06-15 DIAGNOSIS — I25.10 CORONARY ARTERY DISEASE INVOLVING NATIVE CORONARY ARTERY OF NATIVE HEART WITHOUT ANGINA PECTORIS: ICD-10-CM

## 2018-06-15 DIAGNOSIS — G47.33 OSA ON CPAP: ICD-10-CM

## 2018-06-15 DIAGNOSIS — I48.0 PAF (PAROXYSMAL ATRIAL FIBRILLATION): ICD-10-CM

## 2018-06-15 DIAGNOSIS — E66.9 OBESITY (BMI 30.0-34.9): ICD-10-CM

## 2018-06-15 DIAGNOSIS — I50.32 CHRONIC DIASTOLIC CONGESTIVE HEART FAILURE: Primary | ICD-10-CM

## 2018-06-15 DIAGNOSIS — I10 ESSENTIAL HYPERTENSION: ICD-10-CM

## 2018-06-15 PROCEDURE — 99999 PR PBB SHADOW E&M-EST. PATIENT-LVL III: CPT | Mod: PBBFAC,,, | Performed by: INTERNAL MEDICINE

## 2018-06-15 PROCEDURE — 3008F BODY MASS INDEX DOCD: CPT | Mod: CPTII,S$GLB,, | Performed by: INTERNAL MEDICINE

## 2018-06-15 PROCEDURE — 3074F SYST BP LT 130 MM HG: CPT | Mod: CPTII,S$GLB,, | Performed by: INTERNAL MEDICINE

## 2018-06-15 PROCEDURE — 99214 OFFICE O/P EST MOD 30 MIN: CPT | Mod: S$GLB,,, | Performed by: INTERNAL MEDICINE

## 2018-06-15 PROCEDURE — 3079F DIAST BP 80-89 MM HG: CPT | Mod: CPTII,S$GLB,, | Performed by: INTERNAL MEDICINE

## 2018-06-15 RX ORDER — METOLAZONE 2.5 MG/1
2.5 TABLET ORAL DAILY
Qty: 30 TABLET | Refills: 11 | Status: SHIPPED | OUTPATIENT
Start: 2018-06-15 | End: 2018-09-13 | Stop reason: SDUPTHER

## 2018-06-15 NOTE — PROGRESS NOTES
Subjective:   Patient ID:  Crow Green is a 62 y.o. male who presents for follow up of Shortness of Breath and Hospital Follow Up      60 yo AAM, came in for 1-yr f/u  PMH CAD nonobstructive per LHC done in 11/16, PAF, CHFpEF 45%, DM, hTN, HLD and obesity,  -11/2016, Pt was admitted to [facility] for cough with little sputum for 6 weeks. Had severe left chest pain only with cough and sitting up from the bed. No pain with exercise. Denied palpitation, dizziness, orthopnea, PND and syncope. Troponin up to 0.218 and trending down. Echo showed EF 45% and MPI showed anteroseptal positive. Subsequent LHC showed d2 30% lesion and otherwise demi Dz.   -03/2017, he was admitted to [facility] for acute cholelithiasis, s/p lap aidan. Pt had PAF during the admisssion and has been on amiodarone and Eliquis. BNP was up to 144 from 44 done five months ago.    Today, c/o SOB for two weeks, walking 25 yards and take a break, positive orthopnea and PND.   Gained 10 pounds in two months. No leg swelling.  Admitted to [facility] 3 weeks ago for PNA and CHF.  . Cr 1.0. Elevated troponin, MPI showed SPECT images at rest and stress studies showed a moderate-sized, moderate anteroseptal perfusion defect and a large, severe inferior wall perfusion defect that are fixed. The gated stress study demonstrated a left ventricular ejection fraction of 35%, and ECHo showed EF 50%  Occasional cough                    Past Medical History:   Diagnosis Date    Acute kidney injury     Arthritis     Asthma     Atrial fibrillation     CHF (congestive heart failure)     Depression     Diabetes mellitus, type 2 Diagnosed in 2000    Elevated PSA     Hypertension     Sleep apnea        Past Surgical History:   Procedure Laterality Date    CHOLECYSTECTOMY         Social History   Substance Use Topics    Smoking status: Never Smoker    Smokeless tobacco: Never Used    Alcohol use No       Family History   Problem Relation Age of Onset     Glaucoma Mother     Cataracts Mother     Cataracts Father     Glaucoma Sister     Cataracts Sister     Glaucoma Maternal Aunt     Prostate cancer Neg Hx          Review of Systems   Constitution: Negative for decreased appetite, diaphoresis, fever, weakness, malaise/fatigue and night sweats.   HENT: Negative for nosebleeds.    Eyes: Negative for blurred vision and double vision.   Cardiovascular: Positive for dyspnea on exertion, orthopnea and paroxysmal nocturnal dyspnea. Negative for chest pain, claudication, irregular heartbeat, leg swelling, near-syncope, palpitations and syncope.   Respiratory: Negative for cough, shortness of breath, sleep disturbances due to breathing, snoring, sputum production and wheezing.    Endocrine: Negative for cold intolerance and polyuria.   Hematologic/Lymphatic: Does not bruise/bleed easily.   Skin: Negative for rash.   Musculoskeletal: Negative for back pain, falls, joint pain, joint swelling and neck pain.   Gastrointestinal: Positive for nausea. Negative for abdominal pain, heartburn and vomiting.   Genitourinary: Negative for dysuria, frequency and hematuria.   Neurological: Positive for headaches. Negative for difficulty with concentration, dizziness, focal weakness, light-headedness, numbness and seizures.   Psychiatric/Behavioral: Negative for depression, memory loss and substance abuse. The patient does not have insomnia.    Allergic/Immunologic: Negative for HIV exposure and hives.       Objective:   Physical Exam   Constitutional: He is oriented to person, place, and time. He appears well-nourished.   HENT:   Head: Normocephalic.   Eyes: Pupils are equal, round, and reactive to light.   Neck: Normal carotid pulses and no JVD present. Carotid bruit is not present. No thyromegaly present.   Cardiovascular: Normal rate, regular rhythm, normal heart sounds and normal pulses.   No extrasystoles are present. PMI is not displaced.  Exam reveals no gallop and no S3.    No  murmur heard.  Pulmonary/Chest: Breath sounds normal. No stridor. No respiratory distress.   Crackles +     Abdominal: Soft. Bowel sounds are normal. There is no tenderness. There is no rebound.   Musculoskeletal: Normal range of motion.   Neurological: He is alert and oriented to person, place, and time.   Skin: Skin is intact. No rash noted.   Psychiatric: His behavior is normal.       Lab Results   Component Value Date    CHOL 171 07/07/2017    CHOL 189 11/16/2016     Lab Results   Component Value Date    HDL 42 07/07/2017    HDL 42 11/16/2016     Lab Results   Component Value Date    LDLCALC 112.4 07/07/2017    LDLCALC 120.2 11/16/2016     Lab Results   Component Value Date    TRIG 83 07/07/2017    TRIG 134 11/16/2016     Lab Results   Component Value Date    CHOLHDL 24.6 07/07/2017    CHOLHDL 22.2 11/16/2016       Chemistry        Component Value Date/Time     03/02/2018 1056    K 4.2 03/02/2018 1056     03/02/2018 1056    CO2 27 03/02/2018 1056    BUN 14 03/02/2018 1056    CREATININE 0.9 03/02/2018 1056     (H) 03/02/2018 1056        Component Value Date/Time    CALCIUM 9.7 03/02/2018 1056    ALKPHOS 93 03/02/2018 1056    AST 69 (H) 03/02/2018 1056    ALT 66 (H) 03/02/2018 1056    BILITOT 0.3 03/02/2018 1056    ESTGFRAFRICA >60 03/02/2018 1056    EGFRNONAA >60 03/02/2018 1056          Lab Results   Component Value Date    TSH 2.374 07/20/2017     Lab Results   Component Value Date    INR 1.0 03/15/2017    INR 1.0 11/16/2016    INR 1.0 11/15/2016     Lab Results   Component Value Date    WBC 12.22 03/02/2018    HGB 14.4 03/02/2018    HCT 44.8 03/02/2018    MCV 90 03/02/2018     03/02/2018     BMP  Sodium   Date Value Ref Range Status   03/02/2018 142 136 - 145 mmol/L Final     Potassium   Date Value Ref Range Status   03/02/2018 4.2 3.5 - 5.1 mmol/L Final     Chloride   Date Value Ref Range Status   03/02/2018 103 95 - 110 mmol/L Final     CO2   Date Value Ref Range Status   03/02/2018  27 23 - 29 mmol/L Final     BUN, Bld   Date Value Ref Range Status   03/02/2018 14 6 - 20 mg/dL Final     Creatinine   Date Value Ref Range Status   03/02/2018 0.9 0.5 - 1.4 mg/dL Final     Calcium   Date Value Ref Range Status   03/02/2018 9.7 8.7 - 10.5 mg/dL Final     Anion Gap   Date Value Ref Range Status   03/02/2018 12 8 - 16 mmol/L Final     eGFR if    Date Value Ref Range Status   03/02/2018 >60 >60 mL/min/1.73 m^2 Final     eGFR if non    Date Value Ref Range Status   03/02/2018 >60 >60 mL/min/1.73 m^2 Final     Comment:     Calculation used to obtain the estimated glomerular filtration  rate (eGFR) is the CKD-EPI equation.        CrCl cannot be calculated (Patient's most recent lab result is older than the maximum 7 days allowed.).     Assessment:      1. Chronic diastolic congestive heart failure    2. Coronary artery disease involving native coronary artery of native heart without angina pectoris    3. Essential hypertension    4. PAF (paroxysmal atrial fibrillation)    5. Obesity (BMI 30.0-34.9)    6. SANDRITA on CPAP      CHFpEF fludi oerloaded  BP soft  Plan:   Not start Aldactone yet,will d./c dueot tosoft BP  Add Metolazone 2.5 mg daily 30 min before lasix 80 mg daily  Continue current meds.  Recommend heart-healthy diet, weight control and regular exercise.  Agustin. Risk modification.   RTC in 4 weeks    I have reviewed all pertinent labs and cardiac studies. Plans and recommendations have been formulated under my direct supervision. All questions answered and patient voiced understanding. Patient to continue current medications.

## 2018-06-18 ENCOUNTER — TELEPHONE (OUTPATIENT)
Dept: INTERNAL MEDICINE | Facility: CLINIC | Age: 63
End: 2018-06-18

## 2018-06-18 NOTE — TELEPHONE ENCOUNTER
----- Message from Jean Christie sent at 6/18/2018  9:43 AM CDT -----  Contact: Briana_ [employer] [phone]  Would like to have orders for physical therapy re-faxed to [phone].   Please call back at  [phone].  Jocelyn Pham

## 2018-06-18 NOTE — TELEPHONE ENCOUNTER
----- Message from Mark Sims sent at 6/18/2018  9:26 AM CDT -----  Contact: pt   Pt wants to check on getting author. To go to therapy         ..[phone]

## 2018-06-20 ENCOUNTER — TELEPHONE (OUTPATIENT)
Dept: INTERNAL MEDICINE | Facility: CLINIC | Age: 63
End: 2018-06-20

## 2018-06-20 NOTE — ASSESSMENT & PLAN NOTE
Recent echo at [facility] showed a decreased ejection fraction below 40.  Counseled on the importance of adherence to all of his medication.  Unfortunately he has some challenges with organizing all of his medications and we are trying to get someone to go to his home weekly to the set everything out for him so he can be more compliant.

## 2018-06-20 NOTE — ASSESSMENT & PLAN NOTE
Upon review of his discharge summary, there is a statement that said his glucose was very well controlled in the hospital which brought up the question of whether he has been compliant at home.

## 2018-06-20 NOTE — TELEPHONE ENCOUNTER
----- Message from Mateo Lambert, DO sent at 6/19/2018 10:07 PM CDT -----  Can we call to see if he has had anyone help him set up his medicines yet?

## 2018-06-20 NOTE — ASSESSMENT & PLAN NOTE
He is improving since hospital discharge.  Currently, he does not qualify for home oxygen.  I had him ambulate around the clinic and the nursing station and he never dropped below 90% on room air.

## 2018-06-20 NOTE — PROGRESS NOTES
Subjective:       Patient ID: Crow Green is a 62 y.o. male.    Chief Complaint: Follow-up (Hospital F/U)    HPI  Transitional Care Note    Family and/or Caretaker present at visit?  No.  Diagnostic tests reviewed/disposition: No diagnosic tests pending after this hospitalization.  Disease/illness education: complete  Home health/community services discussion/referrals: Patient does not have home health established from hospital visit.  They do need home health.  If needed, we will set up home health for the patient. But he does not qualify since he is still able to get out. He really just needs someone to organize his meds  Establishment or re-establishment of referral orders for community resources: No other necessary community resources.   Discussion with other health care providers: No discussion with other health care providers necessary.       Mr. Green  Recently had to call the ambulance for acute shortness of breath.  He was taken to [facility] and admitted for several days.  He was found to be  In respiratory distress and was placed on BiPAP.  Initially he was started on IV antibiotics has it seemed on CT scan he had some bilateral interstitial infiltrates.  Sputum cultures remain negative and he actually responded quite well to intravenous diuresis.  He was gradually weaned off of oxygen during the hospitalization.  He was discontinued off of amiodarone as there was some thought that this could have been contributing to his infiltrates.  His ejection fraction in the hospital was found to be 35%.  There considering adding Aldactone to his regimen but deferred to an outpatient basis.    Other change that was made to his medication was an increase of lisinopril.    Family History   Problem Relation Age of Onset    Glaucoma Mother     Cataracts Mother     Cataracts Father     Glaucoma Sister     Cataracts Sister     Glaucoma Maternal Aunt     Prostate cancer Neg Hx        Current  Outpatient Prescriptions:     ALCOHOL ANTISEPTIC PADS (ALCOHOL PREP PADS TOP), , Disp: , Rfl:     amLODIPine (NORVASC) 10 MG tablet, TAKE 1 TABLET ONE TIME DAILY, Disp: 90 tablet, Rfl: 3    ammonium lactate 12 % Crea, Apply 1 Act topically 2 (two) times daily. (Patient taking differently: Apply 1 Act topically once daily. ), Disp: 1 Tube, Rfl: 3    apixaban 5 mg Tab, Take 1 tablet (5 mg total) by mouth 2 (two) times daily., Disp: 60 tablet, Rfl: 1    aspirin (ECOTRIN) 81 MG EC tablet, Take 1 tablet (81 mg total) by mouth once daily., Disp: , Rfl: 0    atorvastatin (LIPITOR) 40 MG tablet, TAKE 1 TABLET EVERY EVENING, Disp: 90 tablet, Rfl: 3    baclofen (LIORESAL) 10 MG tablet, Take 1 tablet (10 mg total) by mouth nightly as needed., Disp: 30 tablet, Rfl: 1    doxepin (SINEQUAN) 150 MG Cap, , Disp: , Rfl:     DULoxetine (CYMBALTA) 60 MG capsule, Take 60 mg by mouth once daily., Disp: , Rfl:     exenatide microspheres (BYDUREON BCISE) 2 mg/0.85 mL AtIn, Inject 2 mg into the skin every 7 days., Disp: 4 Syringe, Rfl: 11    fluticasone-salmeterol 250-50 mcg/dose (ADVAIR DISKUS) 250-50 mcg/dose diskus inhaler, Inhale 1 puff into the lungs 2 (two) times daily. Controller, Disp: 60 each, Rfl: 11    furosemide (LASIX) 80 MG tablet, TK 1 T PO BID, Disp: , Rfl: 0    gabapentin (NEURONTIN) 300 MG capsule, TAKE 1 CAPSULE THREE TIMES DAILY, Disp: 270 capsule, Rfl: 3    gabapentin (NEURONTIN) 600 MG tablet, Take 1 tablet (600 mg total) by mouth 3 (three) times daily., Disp: 90 tablet, Rfl: 1    HYDROcodone-acetaminophen (NORCO)  mg per tablet, Take 1 tablet by mouth every 12 (twelve) hours as needed for Pain (severe pain only)., Disp: 60 tablet, Rfl: 0    insulin glargine (LANTUS SOLOSTAR) 100 unit/mL (3 mL) InPn pen, Inject 50 Units into the skin every evening. (Patient taking differently: Inject 60 Units into the skin every evening. ), Disp: 2 Box, Rfl: 11    insulin syringe-needle U-100 1 mL 31 gauge x  "5/16 Syrg, , Disp: , Rfl:     latanoprost 0.005 % ophthalmic solution, Place 1 drop into both eyes every evening., Disp: 1 Bottle, Rfl: 4    lisinopril 10 MG tablet, Take 10 mg by mouth., Disp: , Rfl:     metformin (GLUCOPHAGE) 1000 MG tablet, Take 1 tablet (1,000 mg total) by mouth 2 (two) times daily with meals., Disp: 180 tablet, Rfl: 3    metoprolol succinate (TOPROL-XL) 50 MG 24 hr tablet, TAKE 1 TABLET EVERY DAY, Disp: 90 tablet, Rfl: 5    naproxen (NAPROSYN) 500 MG tablet, Take 1 tablet (500 mg total) by mouth 2 (two) times daily with meals. To help with pain, Disp: 14 tablet, Rfl: 0    nitroGLYCERIN (NITROSTAT) 0.3 MG SL tablet, PLACE 1 TABLET UNDER THE TONGUE EVERY 5 MINUTES AS NEEDED FOR CHEST PAIN, NO MORE THAN 3 TABLETS IN ONE DAY, Disp: 100 tablet, Rfl: 0    NOVOLOG FLEXPEN 100 unit/mL InPn pen, ADMINISTER 20 UNITS UNDER THE SKIN THREE TIMES DAILY WITH MEALS, Disp: 15 mL, Rfl: 0    pantoprazole (PROTONIX) 40 MG tablet, Take 1 tablet (40 mg total) by mouth once daily., Disp: 30 tablet, Rfl: 11    pen needle, diabetic (BD INSULIN PEN NEEDLE UF SHORT) 31 gauge x 5/16" Ndle, Inject 1 each into the skin 3 (three) times daily., Disp: 90 each, Rfl: 11    potassium chloride (KLOR-CON) 10 MEQ TbSR, Take 10 mEq by mouth once., Disp: , Rfl:     sertraline (ZOLOFT) 50 MG tablet, TAKE 1 TABLET ONE TIME DAILY, Disp: 90 tablet, Rfl: 3    trazodone (DESYREL) 150 MG tablet, , Disp: , Rfl:     TRUE METRIX AIR GLUCOSE METER kit, TEST FOUR TIMES DAILY BEFORE MEALS  AND EVERY NIGHT, Disp: 1 each, Rfl: 0    VENTOLIN HFA 90 mcg/actuation inhaler, , Disp: , Rfl:     amiodarone (PACERONE) 200 MG Tab, Take 1 tablet (200 mg total) by mouth once daily., Disp: 30 tablet, Rfl: 11    metOLazone (ZAROXOLYN) 2.5 MG tablet, Take 1 tablet (2.5 mg total) by mouth once daily., Disp: 30 tablet, Rfl: 11    tiotropium (SPIRIVA WITH HANDIHALER) 18 mcg inhalation capsule, Inhale 1 capsule (18 mcg total) into the lungs once " "daily. Controller, Disp: 90 capsule, Rfl: 3    Review of Systems   Constitutional: Positive for fatigue. Negative for chills and fever.   Eyes: Negative for visual disturbance.   Respiratory: Positive for shortness of breath. Negative for cough.    Cardiovascular: Negative for chest pain.   Gastrointestinal: Negative for abdominal pain.   Neurological: Negative for dizziness.       Objective:   BP (!) 144/82 (BP Location: Left arm, Patient Position: Sitting, BP Method: Large (Automatic))   Pulse 91   Temp 96.9 °F (36.1 °C) (Tympanic)   Resp 19   Ht 5' 5" (1.651 m)   Wt 94.8 kg (208 lb 15.9 oz)   SpO2 (!) 90%   BMI 34.78 kg/m²      Physical Exam   Constitutional: He is oriented to person, place, and time. He appears well-developed and well-nourished. No distress.   HENT:   Head: Normocephalic and atraumatic.   Nose: Nose normal.   Eyes: Conjunctivae and EOM are normal. Pupils are equal, round, and reactive to light. Right eye exhibits no discharge. Left eye exhibits no discharge.   Neck: No thyromegaly present.   Cardiovascular: Normal rate, regular rhythm and normal heart sounds.    No murmur heard.  Pulmonary/Chest: Effort normal. No respiratory distress (No respiratory distress and was able to ambulate with the help of a walker around the nurse's station without his oxygen saturation dipping below 90). He has no wheezes. He has rales (Faint rales at the bases.).   Abdominal: Soft. He exhibits no distension.   Musculoskeletal: He exhibits no edema.   Neurological: He is alert and oriented to person, place, and time.   Skin: Skin is warm. No rash noted. He is not diaphoretic.   Psychiatric: He has a normal mood and affect. His behavior is normal.   Vitals reviewed.      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            Cardiac/Vascular    Chronic diastolic congestive heart failure - Primary    Current Assessment & Plan       Recent echo at [facility] showed a decreased ejection fraction below 40.  Counseled on the importance of adherence to all of his medication.  Unfortunately he has some challenges with organizing all of his medications and we are trying to get someone to go to his home weekly to the set everything out for him so he can be more compliant.            Orthopedic    Left sided sciatica    Current Assessment & Plan       Symptoms are currently stable at this time.            Other    Dyspnea on exertion    Current Assessment & Plan       He is improving since hospital discharge.  Currently, he does not qualify for home oxygen.  I had him ambulate around the clinic and the nursing station and he never dropped below 90% on room air.                 No Follow-up on file.

## 2018-06-21 ENCOUNTER — TELEPHONE (OUTPATIENT)
Dept: INTERNAL MEDICINE | Facility: CLINIC | Age: 63
End: 2018-06-21

## 2018-06-21 NOTE — TELEPHONE ENCOUNTER
----- Message from Neyda Bashir sent at 6/21/2018  9:41 AM CDT -----  Contact: Briana/[facility]  Briana needs a copy of his MRI fax to [phone] and or call her at [phone].    Thanks  Td

## 2018-06-22 ENCOUNTER — TELEPHONE (OUTPATIENT)
Dept: URGENT CARE | Facility: CLINIC | Age: 63
End: 2018-06-22

## 2018-06-22 ENCOUNTER — TELEPHONE (OUTPATIENT)
Dept: CARDIOLOGY | Facility: CLINIC | Age: 63
End: 2018-06-22

## 2018-06-22 DIAGNOSIS — I50.32 CHRONIC DIASTOLIC CONGESTIVE HEART FAILURE: Primary | ICD-10-CM

## 2018-06-22 NOTE — TELEPHONE ENCOUNTER
Pt doesn't feel as though metolazone isn't working.  His urine output isn't any different than before and sometimes he doesn't urinate all day.  Will ask dr. Zhao to advise.

## 2018-06-22 NOTE — TELEPHONE ENCOUNTER
----- Message from Neyda Bashir sent at 6/22/2018  8:36 AM CDT -----  Contact: Patient   Patient would like a call back at [phone], Regards to he feels as if the Metolazone is not working.    Thanks  Td

## 2018-06-25 NOTE — TELEPHONE ENCOUNTER
Spoke with pt who said he is taking Metolazone 30 min before lasix.  Scheduled BNP for tomorrow per dr. Zhao.

## 2018-06-26 ENCOUNTER — OFFICE VISIT (OUTPATIENT)
Dept: OPHTHALMOLOGY | Facility: CLINIC | Age: 63
End: 2018-06-26
Payer: MEDICARE

## 2018-06-26 ENCOUNTER — LAB VISIT (OUTPATIENT)
Dept: LAB | Facility: HOSPITAL | Age: 63
End: 2018-06-26
Attending: INTERNAL MEDICINE
Payer: MEDICARE

## 2018-06-26 DIAGNOSIS — I50.32 CHRONIC DIASTOLIC CONGESTIVE HEART FAILURE: ICD-10-CM

## 2018-06-26 DIAGNOSIS — H40.1134 PRIMARY OPEN ANGLE GLAUCOMA OF BOTH EYES, INDETERMINATE STAGE: Primary | ICD-10-CM

## 2018-06-26 LAB — BNP SERPL-MCNC: 132 PG/ML

## 2018-06-26 PROCEDURE — 92012 INTRM OPH EXAM EST PATIENT: CPT | Mod: S$GLB,,, | Performed by: OPTOMETRIST

## 2018-06-26 PROCEDURE — 36415 COLL VENOUS BLD VENIPUNCTURE: CPT | Mod: PO

## 2018-06-26 PROCEDURE — 99999 PR PBB SHADOW E&M-EST. PATIENT-LVL II: CPT | Mod: PBBFAC,,, | Performed by: OPTOMETRIST

## 2018-06-26 PROCEDURE — 83880 ASSAY OF NATRIURETIC PEPTIDE: CPT

## 2018-06-26 NOTE — PROGRESS NOTES
HPI     Last [facility] exam 03/05/2018  3 month IOP check   Glaucoma suspect   Medication: Latanoprost 0.005% 1 drop qhs OU  Last HVF/GOCT 03/05/2018 and I plan to repeat these test in December this   year.  In the interim I plan 2 more IOP checks = today and late September.    Diabetic/IDDM    Last edited by SOILA Dias, OD on 6/26/2018  9:38 AM. (History)            Assessment /Plan     For exam results, see Encounter Report.    Primary open angle glaucoma of both eyes, indeterminate stage      IOP much better today.  RTC 3 months for IOP check.  Will do full testing again this December.

## 2018-06-27 ENCOUNTER — TELEPHONE (OUTPATIENT)
Dept: INTERNAL MEDICINE | Facility: CLINIC | Age: 63
End: 2018-06-27

## 2018-06-27 ENCOUNTER — TELEPHONE (OUTPATIENT)
Dept: CARDIOLOGY | Facility: CLINIC | Age: 63
End: 2018-06-27

## 2018-06-27 RX ORDER — HYDROCODONE BITARTRATE AND ACETAMINOPHEN 10; 325 MG/1; MG/1
1 TABLET ORAL EVERY 12 HOURS PRN
Qty: 14 TABLET | Refills: 0 | Status: SHIPPED | OUTPATIENT
Start: 2018-06-27 | End: 2018-07-04

## 2018-06-27 NOTE — TELEPHONE ENCOUNTER
----- Message from Aixa Smiley sent at 6/27/2018  4:36 PM CDT -----  Contact: patient  Calling concerning refill of his pain medication. States he is in very severe pain Please call patient TODAY ASAP URGENT!!! @ [phone]. Thanks, Ellie

## 2018-06-27 NOTE — TELEPHONE ENCOUNTER
Tried calling pt back with no answer or way to leave a msg. But before I tried calling the pt back I called [facility] pharmacy where his norco Rx was sent today at 9:30 this morning to make sure they had it and they said that he picked the Rx up today already that it was 14 pills he picked up.

## 2018-06-27 NOTE — TELEPHONE ENCOUNTER
----- Message from Maxine Velazquez sent at 6/27/2018  9:04 AM CDT -----  Contact: PT   ...1. What is the name of the medication you are requesting? Hydrocondon  2. What is the dose? 10 mg   3. How do you take the medication? Orally, topically, etc? Orally   4. How often do you take this medication? Twice a day   5. Do you need a 30 day or 90 day supply? 30   6. How many refills are you requesting? 0  7. What is your preferred pharmacy and location of the pharmacy? .  [id number] - [facility] [place] - [address] AT [facility]  [address] [place] [address]  Phone: [phone] Fax: [phone]        8. Who can we contact with further questions? PT

## 2018-06-27 NOTE — TELEPHONE ENCOUNTER
Spoke with pt with test results.  Pt wanted to know if he should increase lasix as discussed with Dr Zhao.  Will ask dr. Zhao to advise.    ----- Message from Eduard Zhao MD sent at 6/26/2018  4:43 PM CDT -----  bnp mildly elevated

## 2018-06-28 ENCOUNTER — TELEPHONE (OUTPATIENT)
Dept: CARDIOLOGY | Facility: CLINIC | Age: 63
End: 2018-06-28

## 2018-06-28 ENCOUNTER — TELEPHONE (OUTPATIENT)
Dept: INTERNAL MEDICINE | Facility: CLINIC | Age: 63
End: 2018-06-28

## 2018-06-28 NOTE — TELEPHONE ENCOUNTER
----- Message from Cheryl Hull sent at 6/28/2018  9:00 AM CDT -----  Contact: pt  The pt request a return call, no additional info given, can be reached at [phone]///thxMW

## 2018-06-28 NOTE — TELEPHONE ENCOUNTER
Spoke with pt with with labs and not to increase lasix per dr. Zhao.    ----- Message from Eduard Zhao MD sent at 6/26/2018  4:43 PM CDT -----  bnp mildly elevated

## 2018-07-10 ENCOUNTER — TELEPHONE (OUTPATIENT)
Dept: INTERNAL MEDICINE | Facility: CLINIC | Age: 63
End: 2018-07-10

## 2018-07-10 NOTE — TELEPHONE ENCOUNTER
Pt called requesting a refill on pain medication. Pt stated he has appt on 7/19/18 with Dr Tsai. Please advise

## 2018-07-10 NOTE — TELEPHONE ENCOUNTER
----- Message from Cheryl Hull sent at 7/10/2018 11:21 AM CDT -----  Contact: pt  The pt states he needs a refill on his pain med, can be reached at [phone]///thxMW

## 2018-07-11 ENCOUNTER — TELEPHONE (OUTPATIENT)
Dept: INTERNAL MEDICINE | Facility: CLINIC | Age: 63
End: 2018-07-11

## 2018-07-11 RX ORDER — HYDROCODONE BITARTRATE AND ACETAMINOPHEN 10; 325 MG/1; MG/1
1 TABLET ORAL EVERY 12 HOURS PRN
Qty: 14 TABLET | Refills: 0 | Status: SHIPPED | OUTPATIENT
Start: 2018-07-11 | End: 2018-07-18

## 2018-07-11 NOTE — TELEPHONE ENCOUNTER
----- Message from Kaila Vaughan sent at 7/11/2018 12:52 PM CDT -----  Contact: pt   States someone was suppose to call him back yesterday regarding a refill on his pain medicine and no one called him back. Please call pt at [phone]. Thank you

## 2018-07-11 NOTE — TELEPHONE ENCOUNTER
----- Message from Fouzia Timmons sent at 7/11/2018  2:52 PM CDT -----  Contact: self/[phone]  Returning call, please call back at [phone]. Thanks/ar

## 2018-07-11 NOTE — TELEPHONE ENCOUNTER
Tried calling pt back to let him know that his Rx was sent to the pharmacy already that he needs to check with them. No way to leave a msg.

## 2018-07-11 NOTE — TELEPHONE ENCOUNTER
Spoke with pt and informed him that his Rx was sent to the pharmacy earlier today. Pt verbalized understanding.

## 2018-07-13 DIAGNOSIS — E11.9 TYPE 2 DIABETES MELLITUS WITHOUT COMPLICATION: ICD-10-CM

## 2018-07-16 ENCOUNTER — TELEPHONE (OUTPATIENT)
Dept: INTERNAL MEDICINE | Facility: CLINIC | Age: 63
End: 2018-07-16

## 2018-07-16 NOTE — TELEPHONE ENCOUNTER
Pt called returned, pt stated he was having unbearable pains from his neck and jaw  radiates to shoulders and chest pain with sob  which goes down his body. Pt stated he has taken total of 3 nitroglycerin in a total a few hours unrelieved. Upon a nurse triage pt moaning in pain and exertion when breathing. Nurse called 911 in area where pt leaves to assess patient status. Nurse called patient and informed pt EMS is on the way, nurse stayed on the phone with pt until EMS arrived.  Nurse spoke with EMS verbal health hx given to EMS.

## 2018-07-16 NOTE — TELEPHONE ENCOUNTER
----- Message from Georgette Flores sent at 7/16/2018  8:00 AM CDT -----  Contact: pt  Pt is having tremendos pain and cannot sleep.  Please advise because is doesn't feel like he can wait until Friday to see the  [phone]

## 2018-07-17 ENCOUNTER — TELEPHONE (OUTPATIENT)
Dept: INTERNAL MEDICINE | Facility: CLINIC | Age: 63
End: 2018-07-17

## 2018-07-17 NOTE — TELEPHONE ENCOUNTER
Called patient to follow up to see how he was feeling this morning. Patient states he was admitted to [facility] on yesterday due to how he was breathing. Patient states he was still in hospital. Informed patient to contact our office if need anything and will see him once he is discharge

## 2018-07-19 ENCOUNTER — OFFICE VISIT (OUTPATIENT)
Dept: PAIN MEDICINE | Facility: CLINIC | Age: 63
End: 2018-07-19
Payer: MEDICARE

## 2018-07-19 VITALS
HEIGHT: 65 IN | DIASTOLIC BLOOD PRESSURE: 70 MMHG | WEIGHT: 208 LBS | HEART RATE: 46 BPM | SYSTOLIC BLOOD PRESSURE: 140 MMHG | RESPIRATION RATE: 16 BRPM | BODY MASS INDEX: 34.66 KG/M2

## 2018-07-19 DIAGNOSIS — M47.816 LUMBAR SPONDYLOSIS: Primary | ICD-10-CM

## 2018-07-19 DIAGNOSIS — M54.16 LUMBAR RADICULOPATHY: ICD-10-CM

## 2018-07-19 PROCEDURE — 3008F BODY MASS INDEX DOCD: CPT | Mod: CPTII,S$GLB,, | Performed by: ANESTHESIOLOGY

## 2018-07-19 PROCEDURE — 3078F DIAST BP <80 MM HG: CPT | Mod: CPTII,S$GLB,, | Performed by: ANESTHESIOLOGY

## 2018-07-19 PROCEDURE — 99999 PR PBB SHADOW E&M-EST. PATIENT-LVL III: CPT | Mod: PBBFAC,,, | Performed by: ANESTHESIOLOGY

## 2018-07-19 PROCEDURE — 99204 OFFICE O/P NEW MOD 45 MIN: CPT | Mod: S$GLB,,, | Performed by: ANESTHESIOLOGY

## 2018-07-19 PROCEDURE — 3077F SYST BP >= 140 MM HG: CPT | Mod: CPTII,S$GLB,, | Performed by: ANESTHESIOLOGY

## 2018-07-19 RX ORDER — HYDROCODONE BITARTRATE AND ACETAMINOPHEN 10; 325 MG/1; MG/1
TABLET ORAL
Qty: 35 TABLET | Refills: 0 | Status: SHIPPED | OUTPATIENT
Start: 2018-07-19 | End: 2018-09-13 | Stop reason: SDUPTHER

## 2018-07-19 NOTE — PROGRESS NOTES
Chief Pain Complaint:  Low-back Pain        History of Present Illness:   Crow Green is a 63 y.o. male  who is presenting with a chief complaint of Low-back Pain  . The patient began experiencing this problem insidiously, and the pain has been gradually worsening over the past 6 month(s). The pain is described as throbbing, shooting, burning and electrical and is located in the left lumbar spine. Pain is intermittent and lasts hours. The pain radiates to  left leg. The patient rates his pain a 8 out of ten and interferes with activities of daily living a 8 out of ten. Pain is exacerbated by flexion of the lumbar spine, and is improved by rest. Patient reports no prior trauma, no prior spinal surgery     - pertinent negatives: No fever, No chills, No weight loss, No bladder dysfunction, No bowel dysfunction, No saddle anesthesia  - pertinent positives: right leg weakness    - medications, other therapies tried (physical therapy, injections):     >> NSAIDs, Tylenol, Norco, gabapentin and flexeril    >> Has previously undergone Physical Therapy    >> Has NOT previously undergone spinal injection/s      Imaging / Labs / Studies (reviewed on 7/19/2018):    Results for orders placed during the hospital encounter of 03/06/18   MRI Lumbar Spine Without Contrast    Narrative EXAMINATION:  MRI LUMBAR SPINE WITHOUT CONTRAST    CLINICAL HISTORY:  Low back pain, >6wks conservative tx, persistent-progressive sx, surgical candidate; Sciatica, left side    TECHNIQUE:  Multiplanar, multisequence MR images were acquired from the thoracolumbar junction to the sacrum without the administration of contrast.    COMPARISON:  None.    FINDINGS:  Vertebral body heights alignment are within normal limits.  There is moderate disc height loss at L5-S1.  Mild disc height loss also noted and L1-2.  There is mild disc desiccation at L3-4 and L4-5. marrow signal is within normal limits with no evidence of fracture or marrow replacement  process.    Conus appears within normal limits terminating at the level of the L1 level.  Cauda equina appears unremarkable    Paraspinous soft tissues appear within normal limits.    T12-L1:   No spinal canal or neuroforaminal narrowing.    L1-2: Small posterior central disc extrusion with superior migration.  No spinal canal or neuroforaminal narrowing.    L2-3:  No spinal canal or neuroforaminal narrowing.    L3-4:  No spinal canal or neuroforaminal narrowing.    L4-5: Mild generalized disc bulge and mild bilateral facet arthropathy.  Moderate left and mild right neuroforaminal narrowing.  No spinal canal stenosis.    L5-S1: Large central/left paracentral disc extrusion with slight inferior migration.  Moderate bilateral facet arthropathy.  Disc material contacts and displaces the descending left S1 nerve root.  Moderate to severe bilateral neural foraminal narrowing.      Impression 1. Posterior central/left paracentral disc extrusion at L5-S1 with mass effect on the descending left S1 nerve root and moderate to severe bilateral neural foraminal narrowing.  2. Other multilevel degenerative disc disease as above.      Electronically signed by: Jame Stephens MD  Date:    03/06/2018  Time:    13:30                                   Results for orders placed during the hospital encounter of 02/10/18   X-Ray Lumbar Spine Ap And Lateral    Narrative Lumbar spine, 5 views    Clinical History:   Pain in the low back.    Findings:      The vertebral body heights and alignment are within normal limits. No acute fracture or subluxation.There is bilateral facet joint osteoarthritis at L4-L5 and L5-S1.  There is a very subtle dextroscoliosis of lumbar spine centered at L2-L3.    Impression      No acute fracture or subluxation. Bilateral facet joint osteoarthritis at L4-L5 and L5-S1.      Electronically signed by: SHAYY TRUJILLO MD  Date:     02/10/18  Time:    09:09                          Review of  "Systems:  CONSTITUTIONAL: patient denies any fever, chills, or weight loss  SKIN: patient denies any rash or itching  RESPIRATORY: patient denies having any shortness of breath  GASTROINTESTINAL: patient denies having any diarrhea, constipation, or bowel incontinence  GENITOURINARY: patient denies having any abnormal bladder function    MUSCULOSKELETAL:  - patient complains of the above noted pain/s (see chief pain complaint)    NEUROLOGICAL:   - pain as above  - strength in Lower extremities is decreased, on the RIGHT  - sensation in Lower extremities is intact, BILATERALLY  - patient denies any loss of bowel or bladder control      PSYCHIATRIC: patient denies any change in mood    Other:  All other systems reviewed and are negative      Physical Exam:  BP (!) 140/70 (BP Location: Right arm, Patient Position: Sitting, BP Method: Medium (Automatic))   Pulse (!) 46   Resp 16   Ht 5' 5" (1.651 m)   Wt 94.3 kg (208 lb)   BMI 34.61 kg/m²  (reviewed on 7/19/2018)  General: Alert and oriented, in no apparent distress.  Gait: normal gait.  Skin: No rashes, No discoloration, No obvious lesions  HEENT: Normocephalic, atraumatic. Pupils equal and round.  Cardiovascular: Regular rate and rhythm , no significant peripheral edema present  Respiratory: Without audible wheezing, without use of accessory muscles of respiration.    Musculoskeletal:    Lumbar Spine    - Pain on flexion of lumbar spine Present  - Straight Leg Raise:  Present    - Pain on extension of lumbar spine Absent  - TTP over the lumbar facet joints Absent  - Lumbar facet loading Absent    -Pain on palpation over the SI joint  Absent  - YONIS: Absent      Neuro:    Strength:  LE R/L: HF: 5/4, HE: 5/5, KF: 5/4; KE: 5/5    Extremity Reflexes: Brisk and symmetric throughout.      Extremity Sensory: Sensation to pinprick and temperature symmetric. Proprioception intact.      Psych:  Mood and affect is appropriate      Assessment:    Crow Green is a 63 " y.o. year old male who is presenting with    Encounter Diagnoses   Name Primary?    Lumbar spondylosis Yes    Lumbar radiculopathy        Plan:    1. Interventional: Schedule patient for Left L4-5, L5-S1 TFESI with local.    2. Pharmacologic: Norco 10/325 mg Po BID PRN (35 tabs) as a bridge to injection.  checked. Last fill on 7/1/2018 (14 tabs). Continue Gabapentin 600 gm Po TID. NSAIDs PRN.     3. Rehabilitative: PT post injection.    4. Diagnostic: None for now.    5. Follow up: Follow-up for After Injection.      20 minutes were spent in this encounter with more than 50% of the time used for counseling and review of the plan.  Imaging / studies reviewed, detailed above.  I discussed in detail the risks, benefits, and alternatives to any and all potential treatment options.  All questions and concerns were fully addressed today in clinic. Medical decision making moderate.    Thank you for the opportunity to assist in the care of this patient.    Best wishes,    Signed:    Dillan Tsai MD          Disclaimer:  This note may have been prepared using voice recognition software, it may have not been extensively proofed, as such there could be errors within the text such as sound alike errors.

## 2018-07-19 NOTE — LETTER
July 19, 2018      Sarmad Gregorio MD  [id number] [address] [address]           [facility]  [address] [place] [id number]  Phone: [phone]  Fax: [phone]          Patient: Crow Green   MR Number: [id number]   YOB: 1955   Date of Visit: 7/19/2018       Dear Dr. Sarmad Gregorio:    Thank you for referring Crow Green to me for evaluation. Attached you will find relevant portions of my assessment and plan of care.    If you have questions, please do not hesitate to call me. I look forward to following Crow Green along with you.    Sincerely,    Dillan Tsai MD    Enclosure  CC:  No Recipients    If you would like to receive this communication electronically, please contact [email or web address] or [phone] to request more information on [employer] access.    For providers and/or their staff who would like to refer a patient to [facility], please contact us through our one-stop-shop provider referral line, [facility], at [phone].    If you feel you have received this communication in error or would no longer like to receive these types of communications, please e-mail [email or web address]

## 2018-08-01 ENCOUNTER — TELEPHONE (OUTPATIENT)
Dept: INTERNAL MEDICINE | Facility: CLINIC | Age: 63
End: 2018-08-01

## 2018-08-01 NOTE — TELEPHONE ENCOUNTER
----- Message from Shamika Mckeon sent at 7/31/2018  4:25 PM CDT -----  Contact: pt  He's calling in regards to medication pls call pt back at [phone] (home) [phone] (work)

## 2018-08-03 ENCOUNTER — OFFICE VISIT (OUTPATIENT)
Dept: PODIATRY | Facility: CLINIC | Age: 63
End: 2018-08-03
Payer: MEDICARE

## 2018-08-03 ENCOUNTER — TELEPHONE (OUTPATIENT)
Dept: PULMONOLOGY | Facility: CLINIC | Age: 63
End: 2018-08-03

## 2018-08-03 ENCOUNTER — TELEPHONE (OUTPATIENT)
Dept: CARDIOLOGY | Facility: CLINIC | Age: 63
End: 2018-08-03

## 2018-08-03 VITALS
DIASTOLIC BLOOD PRESSURE: 93 MMHG | SYSTOLIC BLOOD PRESSURE: 154 MMHG | WEIGHT: 206.38 LBS | HEART RATE: 97 BPM | RESPIRATION RATE: 20 BRPM | BODY MASS INDEX: 34.38 KG/M2 | HEIGHT: 65 IN

## 2018-08-03 DIAGNOSIS — E11.49 TYPE II DIABETES MELLITUS WITH NEUROLOGICAL MANIFESTATIONS: Primary | ICD-10-CM

## 2018-08-03 DIAGNOSIS — B35.1 DERMATOPHYTOSIS OF NAIL: ICD-10-CM

## 2018-08-03 PROCEDURE — 11721 DEBRIDE NAIL 6 OR MORE: CPT | Mod: Q9,S$PBB,HCNC, | Performed by: PODIATRIST

## 2018-08-03 PROCEDURE — 11721 DEBRIDE NAIL 6 OR MORE: CPT | Mod: Q9,PBBFAC,HCNC,PO | Performed by: PODIATRIST

## 2018-08-03 PROCEDURE — 99499 UNLISTED E&M SERVICE: CPT | Mod: S$PBB,HCNC,, | Performed by: PODIATRIST

## 2018-08-03 PROCEDURE — 99215 OFFICE O/P EST HI 40 MIN: CPT | Mod: PBBFAC,HCNC,PO,25 | Performed by: PODIATRIST

## 2018-08-03 PROCEDURE — 99999 PR PBB SHADOW E&M-EST. PATIENT-LVL V: CPT | Mod: PBBFAC,HCNC,, | Performed by: PODIATRIST

## 2018-08-03 NOTE — TELEPHONE ENCOUNTER
Pt called. He is having increase in sob/roberson, significant swelling in his left foot. He states he is unable to lay flat to sleep even with cpap mask. I advised pt to report to [facility] for evaluation.

## 2018-08-03 NOTE — TELEPHONE ENCOUNTER
Telephone spoke to patient related to receiving notice from patient's pharmacist that he was on both Breo and Advair inhalers.  It was determined that patient was prescribed Breo 1 discharge from [facility] with visit on 07/16/2018 related to heart failure.  I spoke to the patient he states that he is taking both Breo and Advair.  Advised patient to stop Breo continue Advair as per his choice since he has 3 Advair inhalers on hand to continue.  He is also to continue his Spiriva inhaler daily he has albuterol inhaler on hand if needed.   Patient reports that he has intermittent leg pain of which she was told that it was sciatica type pain and prescribed Neurontin.  He states he continues to have trouble walking with his like pain and intermittent shortness of breath with exertion.    Advised he would need evaluation to further assess.  Patient states he will follow up with his primary care provider, pulmonary, or report to the ER if his symptoms persisted.     Notice faxed back to patient's pharmacist advised to stop Breo and continue Advair.

## 2018-08-06 ENCOUNTER — HOSPITAL ENCOUNTER (EMERGENCY)
Facility: HOSPITAL | Age: 63
Discharge: HOME | End: 2018-08-06
Attending: EMERGENCY MEDICINE
Payer: MEDICARE

## 2018-08-06 VITALS
TEMPERATURE: 99 F | WEIGHT: 201.75 LBS | HEART RATE: 96 BPM | SYSTOLIC BLOOD PRESSURE: 170 MMHG | RESPIRATION RATE: 18 BRPM | OXYGEN SATURATION: 99 % | BODY MASS INDEX: 33.61 KG/M2 | DIASTOLIC BLOOD PRESSURE: 94 MMHG | HEIGHT: 65 IN

## 2018-08-06 DIAGNOSIS — R06.02 SHORTNESS OF BREATH: ICD-10-CM

## 2018-08-06 DIAGNOSIS — I50.9 ACUTE ON CHRONIC CONGESTIVE HEART FAILURE, UNSPECIFIED HEART FAILURE TYPE: Primary | ICD-10-CM

## 2018-08-06 DIAGNOSIS — F14.10 COCAINE ABUSE: ICD-10-CM

## 2018-08-06 DIAGNOSIS — M79.89 LEG SWELLING: ICD-10-CM

## 2018-08-06 LAB
ALBUMIN SERPL BCP-MCNC: 3.3 G/DL
ALP SERPL-CCNC: 63 U/L
ALT SERPL W/O P-5'-P-CCNC: 39 U/L
AMPHET+METHAMPHET UR QL: NEGATIVE
ANION GAP SERPL CALC-SCNC: 8 MMOL/L
AST SERPL-CCNC: 36 U/L
BARBITURATES UR QL SCN>200 NG/ML: NEGATIVE
BASOPHILS # BLD AUTO: 0.01 K/UL
BASOPHILS NFR BLD: 0.1 %
BENZODIAZ UR QL SCN>200 NG/ML: NEGATIVE
BILIRUB SERPL-MCNC: 0.7 MG/DL
BILIRUB UR QL STRIP: NEGATIVE
BNP SERPL-MCNC: 325 PG/ML
BUN SERPL-MCNC: 10 MG/DL
BZE UR QL SCN: ABNORMAL
CALCIUM SERPL-MCNC: 8.9 MG/DL
CANNABINOIDS UR QL SCN: NEGATIVE
CHLORIDE SERPL-SCNC: 104 MMOL/L
CLARITY UR: CLEAR
CO2 SERPL-SCNC: 25 MMOL/L
COLOR UR: YELLOW
CREAT SERPL-MCNC: 0.8 MG/DL
CREAT UR-MCNC: 22.4 MG/DL
DIFFERENTIAL METHOD: ABNORMAL
EOSINOPHIL # BLD AUTO: 0.1 K/UL
EOSINOPHIL NFR BLD: 1.3 %
ERYTHROCYTE [DISTWIDTH] IN BLOOD BY AUTOMATED COUNT: 15.3 %
EST. GFR  (AFRICAN AMERICAN): >60 ML/MIN/1.73 M^2
EST. GFR  (NON AFRICAN AMERICAN): >60 ML/MIN/1.73 M^2
GLUCOSE SERPL-MCNC: 156 MG/DL
GLUCOSE UR QL STRIP: NEGATIVE
HCT VFR BLD AUTO: 36 %
HGB BLD-MCNC: 11.7 G/DL
HGB UR QL STRIP: NEGATIVE
INR PPP: 1
KETONES UR QL STRIP: NEGATIVE
LEUKOCYTE ESTERASE UR QL STRIP: NEGATIVE
LYMPHOCYTES # BLD AUTO: 2.7 K/UL
LYMPHOCYTES NFR BLD: 28.4 %
MCH RBC QN AUTO: 28 PG
MCHC RBC AUTO-ENTMCNC: 32.5 G/DL
MCV RBC AUTO: 86 FL
METHADONE UR QL SCN>300 NG/ML: NEGATIVE
MONOCYTES # BLD AUTO: 0.9 K/UL
MONOCYTES NFR BLD: 9.5 %
NEUTROPHILS # BLD AUTO: 5.8 K/UL
NEUTROPHILS NFR BLD: 60.7 %
NITRITE UR QL STRIP: NEGATIVE
OPIATES UR QL SCN: ABNORMAL
PCP UR QL SCN>25 NG/ML: NEGATIVE
PH UR STRIP: 7 [PH] (ref 5–8)
PLATELET # BLD AUTO: 168 K/UL
PMV BLD AUTO: 10.2 FL
POTASSIUM SERPL-SCNC: 3.7 MMOL/L
PROT SERPL-MCNC: 7.3 G/DL
PROT UR QL STRIP: ABNORMAL
PROTHROMBIN TIME: 10.8 SEC
RBC # BLD AUTO: 4.18 M/UL
SODIUM SERPL-SCNC: 137 MMOL/L
SP GR UR STRIP: 1.01 (ref 1–1.03)
TOXICOLOGY INFORMATION: ABNORMAL
TROPONIN I SERPL DL<=0.01 NG/ML-MCNC: 0.2 NG/ML
TROPONIN I SERPL DL<=0.01 NG/ML-MCNC: 0.21 NG/ML
URN SPEC COLLECT METH UR: ABNORMAL
UROBILINOGEN UR STRIP-ACNC: NEGATIVE EU/DL
WBC # BLD AUTO: 9.58 K/UL

## 2018-08-06 PROCEDURE — 93005 ELECTROCARDIOGRAM TRACING: CPT

## 2018-08-06 PROCEDURE — 63600175 PHARM REV CODE 636 W HCPCS: Performed by: EMERGENCY MEDICINE

## 2018-08-06 PROCEDURE — 85610 PROTHROMBIN TIME: CPT

## 2018-08-06 PROCEDURE — 93010 ELECTROCARDIOGRAM REPORT: CPT | Mod: ,,, | Performed by: INTERNAL MEDICINE

## 2018-08-06 PROCEDURE — 84484 ASSAY OF TROPONIN QUANT: CPT | Mod: 91

## 2018-08-06 PROCEDURE — 80307 DRUG TEST PRSMV CHEM ANLYZR: CPT

## 2018-08-06 PROCEDURE — 81003 URINALYSIS AUTO W/O SCOPE: CPT | Mod: 59

## 2018-08-06 PROCEDURE — 99285 EMERGENCY DEPT VISIT HI MDM: CPT | Mod: 25

## 2018-08-06 PROCEDURE — 80053 COMPREHEN METABOLIC PANEL: CPT

## 2018-08-06 PROCEDURE — 85025 COMPLETE CBC W/AUTO DIFF WBC: CPT

## 2018-08-06 PROCEDURE — 83880 ASSAY OF NATRIURETIC PEPTIDE: CPT

## 2018-08-06 PROCEDURE — 96375 TX/PRO/DX INJ NEW DRUG ADDON: CPT

## 2018-08-06 PROCEDURE — 96374 THER/PROPH/DIAG INJ IV PUSH: CPT

## 2018-08-06 RX ORDER — MORPHINE SULFATE 4 MG/ML
4 INJECTION, SOLUTION INTRAMUSCULAR; INTRAVENOUS
Status: COMPLETED | OUTPATIENT
Start: 2018-08-06 | End: 2018-08-06

## 2018-08-06 RX ORDER — FUROSEMIDE 10 MG/ML
40 INJECTION INTRAMUSCULAR; INTRAVENOUS
Status: COMPLETED | OUTPATIENT
Start: 2018-08-06 | End: 2018-08-06

## 2018-08-06 RX ORDER — ONDANSETRON 2 MG/ML
4 INJECTION INTRAMUSCULAR; INTRAVENOUS
Status: COMPLETED | OUTPATIENT
Start: 2018-08-06 | End: 2018-08-06

## 2018-08-06 RX ADMIN — FUROSEMIDE 40 MG: 10 INJECTION, SOLUTION INTRAMUSCULAR; INTRAVENOUS at 11:08

## 2018-08-06 RX ADMIN — MORPHINE SULFATE 4 MG: 4 INJECTION INTRAVENOUS at 11:08

## 2018-08-06 RX ADMIN — ONDANSETRON 4 MG: 2 INJECTION, SOLUTION INTRAMUSCULAR; INTRAVENOUS at 11:08

## 2018-08-06 NOTE — ED NOTES
Pts monitor alarming with O2 sats in the 70's. Entered room and patient was having periods of apnea in his sleep, woken pt and he reports sleep apnea and sleeps with a mask at night. Placed pt on 2 L and paging resp for mask

## 2018-08-06 NOTE — ED PROVIDER NOTES
SCRIBE #1 NOTE: I, Neyda Mtz, am scribing for, and in the presence of, Ewelina Kay MD. I have scribed the entire note.      History      Chief Complaint   Patient presents with    Leg Swelling     lower extremity swelling onset friday with SOB, hx of CHF       Review of patient's allergies indicates:  No Known Allergies     HPI   HPI    8/6/2018, 11:00 AM   History obtained from the patient      History of Present Illness: Crow Green is a 63 y.o. male patient with a PMHx of DM who presents to the Emergency Department for bilateral leg swelling which onset gradually 3 days ago. Symptoms are constant and moderate in severity. Pt states he has chronic leg pain. No mitigating or exacerbating factors reported. Associated sxs include SOB which is chronic. Patient denies any fever, chills, n/v/d, abd pain, CP, HA, dizziness, back pain, and all other sxs at this time. Prior Tx includes hydrocodone. Pt is on Lasix and Eliquis. No further complaints or concerns at this time.         Arrival mode: Personal vehicle      PCP: Mateo Lambert DO       Past Medical History:  Past Medical History:   Diagnosis Date    Acute kidney injury     Arthritis     Asthma     Atrial fibrillation     CHF (congestive heart failure)     Depression     Diabetes mellitus, type 2 Diagnosed in 2000    Elevated PSA     Hypertension     Sleep apnea        Past Surgical History:  Past Surgical History:   Procedure Laterality Date    CHOLECYSTECTOMY           Family History:  Family History   Problem Relation Age of Onset    Glaucoma Mother     Cataracts Mother     Cataracts Father     Glaucoma Sister     Cataracts Sister     Glaucoma Maternal Aunt     Prostate cancer Neg Hx        Social History:  Social History     Social History Main Topics    Smoking status: Never Smoker    Smokeless tobacco: Never Used    Alcohol use No    Drug use: No    Sexual activity: Not Currently       ROS   Review of Systems    Constitutional: Negative for fever.   HENT: Negative for sore throat.    Respiratory: Negative for shortness of breath.    Cardiovascular: Positive for leg swelling (bilaterally). Negative for chest pain and palpitations.   Gastrointestinal: Negative for nausea.   Genitourinary: Negative for dysuria.   Musculoskeletal: Negative for back pain.   Skin: Negative for rash.   Neurological: Negative for weakness.   Hematological: Does not bruise/bleed easily.   All other systems reviewed and are negative.      Physical Exam      Initial Vitals [08/06/18 1043]   BP Pulse Resp Temp SpO2   (!) 153/71 78 20 98.6 °F (37 °C) 95 %      MAP       --          Physical Exam  Nursing Notes and Vital Signs Reviewed.  Constitutional: Patient is in no apparent distress. Well-developed and well-nourished.  Head: Atraumatic. Normocephalic.  Eyes: PERRL. EOM intact. Conjunctivae are not pale. No scleral icterus.  ENT: Mucous membranes are moist. Oropharynx is clear and symmetric.    Neck: Supple. Full ROM. No lymphadenopathy.  Cardiovascular: Regular rate. Regular rhythm. No murmurs, rubs, or gallops. Distal pulses are 2+ and symmetric.  Pulmonary/Chest: No respiratory distress. Clear to auscultation bilaterally. No wheezing or rales.  Abdominal: Soft and non-distended.  There is no tenderness.  No rebound, guarding, or rigidity. Good bowel sounds.  Genitourinary: No CVA tenderness  Musculoskeletal: Moves all extremities. No obvious deformities. No calf tenderness. Trace edema in bilateral legs. Good pulses. Neurovascularly intact.  Skin: Warm and dry.  Neurological:  Alert, awake, and appropriate.  Normal speech.  No acute focal neurological deficits are appreciated.  Psychiatric: Normal affect. Good eye contact. Appropriate in content.    ED Course    Procedures  ED Vital Signs:  Vitals:    08/06/18 1043 08/06/18 1119 08/06/18 1121 08/06/18 1145   BP: (!) 153/71  (!) 152/78 134/86   Pulse: 78 90 87 92   Resp: 20  18 20   Temp: 98.6 °F  "(37 °C)      TempSrc: Oral      SpO2: 95%  (!) 94% (!) 94%   Weight: 91.5 kg (201 lb 11.5 oz)      Height: 5' 5" (1.651 m)       08/06/18 1201 08/06/18 1302 08/06/18 1401 08/06/18 1502   BP: (!) 148/99 (!) 186/79 (!) 161/91 (!) 149/60   Pulse: 94 90 98 95   Resp: 16 20 (!) 24 (!) 24   Temp:  98.9 °F (37.2 °C)  98.6 °F (37 °C)   TempSrc:  Oral  Oral   SpO2: 96% 95% 96% 98%   Weight:       Height:        08/06/18 1602   BP: (!) 170/94   Pulse: 96   Resp: 18   Temp: 98.6 °F (37 °C)   TempSrc: Oral   SpO2: 99%   Weight:    Height:        Abnormal Lab Results:  Labs Reviewed   CBC W/ AUTO DIFFERENTIAL - Abnormal; Notable for the following:        Result Value    RBC 4.18 (*)     Hemoglobin 11.7 (*)     Hematocrit 36.0 (*)     RDW 15.3 (*)     All other components within normal limits   COMPREHENSIVE METABOLIC PANEL - Abnormal; Notable for the following:     Glucose 156 (*)     Albumin 3.3 (*)     All other components within normal limits   TROPONIN I - Abnormal; Notable for the following:     Troponin I 0.214 (*)     All other components within normal limits   B-TYPE NATRIURETIC PEPTIDE - Abnormal; Notable for the following:      (*)     All other components within normal limits   DRUG SCREEN PANEL, URINE EMERGENCY - Abnormal; Notable for the following:     Creatinine, Random Ur 22.4 (*)     All other components within normal limits   URINALYSIS - Abnormal; Notable for the following:     Protein, UA Trace (*)     All other components within normal limits   TROPONIN I - Abnormal; Notable for the following:     Troponin I 0.195 (*)     All other components within normal limits   PROTIME-INR        All Lab Results:  Results for orders placed or performed during the hospital encounter of 08/06/18   CBC auto differential   Result Value Ref Range    WBC 9.58 3.90 - 12.70 K/uL    RBC 4.18 (L) 4.60 - 6.20 M/uL    Hemoglobin 11.7 (L) 14.0 - 18.0 g/dL    Hematocrit 36.0 (L) 40.0 - 54.0 %    MCV 86 82 - 98 fL    MCH 28.0 " 27.0 - 31.0 pg    MCHC 32.5 32.0 - 36.0 g/dL    RDW 15.3 (H) 11.5 - 14.5 %    Platelets 168 150 - 350 K/uL    MPV 10.2 9.2 - 12.9 fL    Gran # (ANC) 5.8 1.8 - 7.7 K/uL    Lymph # 2.7 1.0 - 4.8 K/uL    Mono # 0.9 0.3 - 1.0 K/uL    Eos # 0.1 0.0 - 0.5 K/uL    Baso # 0.01 0.00 - 0.20 K/uL    Gran% 60.7 38.0 - 73.0 %    Lymph% 28.4 18.0 - 48.0 %    Mono% 9.5 4.0 - 15.0 %    Eosinophil% 1.3 0.0 - 8.0 %    Basophil% 0.1 0.0 - 1.9 %    Differential Method Automated    Comprehensive metabolic panel   Result Value Ref Range    Sodium 137 136 - 145 mmol/L    Potassium 3.7 3.5 - 5.1 mmol/L    Chloride 104 95 - 110 mmol/L    CO2 25 23 - 29 mmol/L    Glucose 156 (H) 70 - 110 mg/dL    BUN, Bld 10 8 - 23 mg/dL    Creatinine 0.8 0.5 - 1.4 mg/dL    Calcium 8.9 8.7 - 10.5 mg/dL    Total Protein 7.3 6.0 - 8.4 g/dL    Albumin 3.3 (L) 3.5 - 5.2 g/dL    Total Bilirubin 0.7 0.1 - 1.0 mg/dL    Alkaline Phosphatase 63 55 - 135 U/L    AST 36 10 - 40 U/L    ALT 39 10 - 44 U/L    Anion Gap 8 8 - 16 mmol/L    eGFR if African American >60 >60 mL/min/1.73 m^2    eGFR if non African American >60 >60 mL/min/1.73 m^2   Troponin I   Result Value Ref Range    Troponin I 0.214 (H) 0.000 - 0.026 ng/mL   Brain natriuretic peptide   Result Value Ref Range     (H) 0 - 99 pg/mL   Protime-INR   Result Value Ref Range    Prothrombin Time 10.8 9.0 - 12.5 sec    INR 1.0 0.8 - 1.2   Drug screen panel, emergency   Result Value Ref Range    Benzodiazepines Negative     Methadone metabolites Negative     Cocaine (Metab.) Presumptive Positive     Opiate Scrn, Ur Presumptive Positive     Barbiturate Screen, Ur Negative     Amphetamine Screen, Ur Negative     THC Negative     Phencyclidine Negative     Creatinine, Random Ur 22.4 (L) 23.0 - 375.0 mg/dL    Toxicology Information SEE COMMENT    Urinalysis Clean Catch   Result Value Ref Range    Specimen UA Urine, Clean Catch     Color, UA Yellow Yellow, Straw, Angela    Appearance, UA Clear Clear    pH, UA 7.0 5.0  - 8.0    Specific Gravity, UA 1.010 1.005 - 1.030    Protein, UA Trace (A) Negative    Glucose, UA Negative Negative    Ketones, UA Negative Negative    Bilirubin (UA) Negative Negative    Occult Blood UA Negative Negative    Nitrite, UA Negative Negative    Urobilinogen, UA Negative <2.0 EU/dL    Leukocytes, UA Negative Negative   Troponin I   Result Value Ref Range    Troponin I 0.195 (H) 0.000 - 0.026 ng/mL         Imaging Results:  Imaging Results          US Lower Extremity Veins Left (Final result)  Result time 08/06/18 12:42:44    Final result by Jeremias Ochoa MD (08/06/18 12:42:44)                 Impression:      Negative for DVT.      Electronically signed by: Jeremias Ochoa MD  Date:    08/06/2018  Time:    12:42             Narrative:    EXAMINATION:  US LOWER EXTREMITY VEINS LEFT    CLINICAL HISTORY:  Left leg pain and swelling, other specified soft tissue disorders    COMPARISON:  None    FINDINGS:  Duplex and color flow imaging with spectral wave analysis performed.    The veins demonstrate normal compressibility and phasicity. No evidence of filling defect.                               X-Ray Chest AP Portable (Final result)  Result time 08/06/18 11:22:14    Final result by Jose Mishra MD (08/06/18 11:22:14)                 Impression:      Borderline heart size with mild vascular congestion.  Atelectatic change right lung base which is stable from 07/12/2017.      Electronically signed by: Jose Mishra MD  Date:    08/06/2018  Time:    11:22             Narrative:    EXAMINATION:  XR CHEST AP PORTABLE    CLINICAL HISTORY:  CHF; shortness of breath    COMPARISON:  07/12/2017    FINDINGS:  Heart is borderline in size.  Aortic atherosclerosis.  There is some atelectatic change in the right lung base with elevation right hemidiaphragm unchanged from prior exam.  Mild pulmonary vascular congestion.                                 The EKG was ordered, reviewed, and independently interpreted by  the ED provider.  Interpretation time: 11:19  Rate: 93 BPM  Rhythm: normal sinus rhythm and marked sinus arrhythmia  Interpretation: Left anterior fascicular block. Septal infarct. No STEMI.  When compared to EKG performed 9/1/17, there are no significant changes.             The Emergency Provider reviewed the vital signs and test results, which are outlined above.    ED Discussion     12:34 PM: Reviewed pt's recent admission at [facility] for chest pain workup mid July. Pt's troponin was 0.2. Pt's troponin showed no change and patient's troponin is chronically elevated at 0.2.per cardiology notes. Pt has no chest pain at this time. Pt's EKG shows no acute changes. Pt's urine was positive for cocaine at [facility].     2:00 PM: Reassessed pt at this time.  Pt states his condition has improved at this time. Discussed with pt that he needs to stop doing cocaine and the risks associated with doing such. Discussed with pt all pertinent ED information and results. Discussed pt dx and plan of tx. Gave pt all f/u and return to the ED instructions. All questions and concerns were addressed at this time. Pt expresses understanding of information and instructions, and is comfortable with plan to discharge. Pt is stable for discharge.    I discussed with patient and/or family/caretaker that evaluation in the ED does not suggest any emergent or life threatening medical conditions requiring immediate intervention beyond what was provided in the ED, and I believe patient is safe for discharge.  Regardless, an unremarkable evaluation in the ED does not preclude the development or presence of a serious of life threatening condition. As such, patient was instructed to return immediately for any worsening or change in current symptoms.      ED Medication(s):  Medications   morphine injection 4 mg (4 mg Intravenous Given 8/6/18 1115)   ondansetron injection 4 mg (4 mg Intravenous Given 8/6/18 1115)   furosemide injection 40 mg (40 mg Intravenous  Given 8/6/18 1141)       Discharge Medication List as of 8/6/2018  1:59 PM          Follow-up Information     [facility]. Schedule an appointment as soon as possible for a visit in 2 days.    Specialty:  Cardiology  Why:  Return to the Emergency Room, If symptoms worsen  Contact information:  [address] [facility] [address]  [phone]                   Medical Decision Making    Medical Decision Making:   Clinical Tests:   Lab Tests: Ordered and Reviewed  Radiological Study: Reviewed and Ordered  Medical Tests: Ordered and Reviewed           Scribe Attestation:   Scribe #1: I performed the above scribed service and the documentation accurately describes the services I performed. I attest to the accuracy of the note.    Attending:   Physician Attestation Statement for Scribe #1: I, Ewelina Kay MD, personally performed the services described in this documentation, as scribed by Neyda Mtz, in my presence, and it is both accurate and complete.          Clinical Impression       ICD-10-CM ICD-9-CM   1. Acute on chronic congestive heart failure, unspecified heart failure type I50.9 428.0   2. Shortness of breath R06.02 786.05   3. Leg swelling M79.89 729.81   4. Cocaine abuse F14.10 305.60       Disposition:   Disposition: Discharged  Condition: Stable             Ewelina Kay MD  08/06/18 0493

## 2018-08-06 NOTE — ED NOTES
Pt lying in bed talking on cell phone. AAO x 4. Skin warm and dry. Resp even and unlabored with equal chest rise and fall. Denies any needs/pain at this time.

## 2018-08-06 NOTE — ED NOTES
Called [facility] transportation for pt twice. Each time the insurance provider states the pt does not have transportation coverage.

## 2018-08-06 NOTE — ED NOTES
Pt AAOx3, resting in bed with eyes closed easily aroused. Skin warm and dry. Resp even and unlabored with equal chest rise and fall. Side rails up x 2, call bell within reach. Denies pain. NAD at this time.

## 2018-08-13 DIAGNOSIS — H40.1134 PRIMARY OPEN ANGLE GLAUCOMA OF BOTH EYES, INDETERMINATE STAGE: ICD-10-CM

## 2018-08-13 RX ORDER — LATANOPROST 50 UG/ML
1 SOLUTION/ DROPS OPHTHALMIC NIGHTLY
Qty: 1 BOTTLE | Refills: 4 | Status: SHIPPED | OUTPATIENT
Start: 2018-08-13 | End: 2018-09-05 | Stop reason: SDUPTHER

## 2018-08-13 NOTE — TELEPHONE ENCOUNTER
----- Message from Kirstin Hull sent at 8/13/2018  3:26 PM CDT -----  Contact: pt  Pt called to request eye drops to be called in..[phone] (home) [phone] (work)                 ..   [facility] [address] - [facility] [place] - [address] AT [facility]   [address] [place] [address]   Phone: [phone] Fax: [phone]

## 2018-08-13 NOTE — PROGRESS NOTES
Subjective:     Patient ID: Crow Green is a 63 y.o. male.    Chief Complaint: Nail Care (last PCP visit was on 6/14/2018 with Dr. Lambert)    Crow is a 63 y.o. male who presents to the clinic for evaluation and treatment of high risk feet. Crow has a past medical history of Acute kidney injury, Arthritis, Asthma, Atrial fibrillation, CHF (congestive heart failure), Depression, Diabetes mellitus, type 2 (Diagnosed in 2000), Elevated PSA, Hypertension, and Sleep apnea. The patient's chief complaint is long, thick toenails.  This patient has documented high risk feet requiring routine maintenance secondary to diabetes mellitis and those secondary complications of diabetes, as mentioned..    PCP: Mateo Lambert, DO    Date Last Seen by PCP: 6/14/18    Current shoe gear:  Affected Foot: Tennis shoes     Unaffected Foot: Tennis shoes    Hemoglobin A1C   Date Value Ref Range Status   02/27/2018 10.2 (H) 4.0 - 5.6 % Final     Comment:     According to ADA guidelines, hemoglobin A1c <7.0% represents  optimal control in non-pregnant diabetic patients. Different  metrics may apply to specific patient populations.   Standards of Medical Care in Diabetes-2016.  For the purpose of screening for the presence of diabetes:  <5.7%     Consistent with the absence of diabetes  5.7-6.4%  Consistent with increasing risk for diabetes   (prediabetes)  >or=6.5%  Consistent with diabetes  Currently, no consensus exists for use of hemoglobin A1c  for diagnosis of diabetes for children.  This Hemoglobin A1c assay has significant interference with fetal   hemoglobin   (HbF). The results are invalid for patients with abnormal amounts of   HbF,   including those with known Hereditary Persistence   of Fetal Hemoglobin. Heterozygous hemoglobin variants (HbAS, HbAC,   HbAD, HbAE, HbA2) do not significantly interfere with this assay;   however, presence of multiple variants in a sample may impact the %   interference.     01/03/2018  10.3 (H) 4.0 - 5.6 % Final     Comment:     According to ADA guidelines, hemoglobin A1c <7.0% represents  optimal control in non-pregnant diabetic patients. Different  metrics may apply to specific patient populations.   Standards of Medical Care in Diabetes-2016.  For the purpose of screening for the presence of diabetes:  <5.7%     Consistent with the absence of diabetes  5.7-6.4%  Consistent with increasing risk for diabetes   (prediabetes)  >or=6.5%  Consistent with diabetes  Currently, no consensus exists for use of hemoglobin A1c  for diagnosis of diabetes for children.  This Hemoglobin A1c assay has significant interference with fetal   hemoglobin   (HbF). The results are invalid for patients with abnormal amounts of   HbF,   including those with known Hereditary Persistence   of Fetal Hemoglobin. Heterozygous hemoglobin variants (HbAS, HbAC,   HbAD, HbAE, HbA2) do not significantly interfere with this assay;   however, presence of multiple variants in a sample may impact the %   interference.     09/28/2017 9.5 (H) 4.0 - 5.6 % Final     Comment:     According to ADA guidelines, hemoglobin A1c <7.0% represents  optimal control in non-pregnant diabetic patients. Different  metrics may apply to specific patient populations.   Standards of Medical Care in Diabetes-2016.  For the purpose of screening for the presence of diabetes:  <5.7%     Consistent with the absence of diabetes  5.7-6.4%  Consistent with increasing risk for diabetes   (prediabetes)  >or=6.5%  Consistent with diabetes  Currently, no consensus exists for use of hemoglobin A1c  for diagnosis of diabetes for children.  This Hemoglobin A1c assay has significant interference with fetal   hemoglobin   (HbF). The results are invalid for patients with abnormal amounts of   HbF,   including those with known Hereditary Persistence   of Fetal Hemoglobin. Heterozygous hemoglobin variants (HbAS, HbAC,   HbAD, HbAE, HbA2) do not significantly interfere with  this assay;   however, presence of multiple variants in a sample may impact the %   interference.             Patient Active Problem List   Diagnosis    ED (erectile dysfunction)    Testicular hypogonadism    Non-proliferative diabetic retinopathy, mild, both eyes    Hypertension    Diabetic polyneuropathy    Coronary artery disease involving native coronary artery without angina pectoris    Chronic diastolic congestive heart failure    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, with long-term current use of insulin    SANDRITA on CPAP    Asthma    Delayed sleep phase syndrome    Psychophysiological insomnia    Nightmares    Sleep related gastroesophageal reflux disease    Inadequate sleep hygiene    PAF (paroxysmal atrial fibrillation)    At risk for medication noncompliance    Obesity (BMI 30.0-34.9)    Indeterminate stage chronic open angle glaucoma    Right hip pain    Elevated erythrocyte sedimentation rate    Gait instability    Chronic right shoulder pain    Arthritis    Left sided sciatica    Dyspnea on exertion    Osteoarthritis of spine with radiculopathy, lumbar region    HNP (herniated nucleus pulposus), lumbar    Gastroesophageal reflux disease without esophagitis       Medication List with Changes/Refills   Current Medications    ALCOHOL ANTISEPTIC PADS (ALCOHOL PREP PADS TOP)        AMIODARONE (PACERONE) 200 MG TAB    Take 1 tablet (200 mg total) by mouth once daily.    AMLODIPINE (NORVASC) 10 MG TABLET    TAKE 1 TABLET ONE TIME DAILY    AMMONIUM LACTATE 12 % CREA    Apply 1 Act topically 2 (two) times daily.    APIXABAN 5 MG TAB    Take 1 tablet (5 mg total) by mouth 2 (two) times daily.    ASPIRIN (ECOTRIN) 81 MG EC TABLET    Take 1 tablet (81 mg total) by mouth once daily.    ATORVASTATIN (LIPITOR) 40 MG TABLET    TAKE 1 TABLET EVERY EVENING    BACLOFEN (LIORESAL) 10 MG TABLET    Take 1 tablet (10 mg total) by mouth  "nightly as needed.    DOXEPIN (SINEQUAN) 150 MG CAP        DULOXETINE (CYMBALTA) 60 MG CAPSULE    Take 60 mg by mouth once daily.    EXENATIDE MICROSPHERES (BYDUREON BCISE) 2 MG/0.85 ML ATIN    Inject 2 mg into the skin every 7 days.    FLUTICASONE-SALMETEROL 250-50 MCG/DOSE (ADVAIR DISKUS) 250-50 MCG/DOSE DISKUS INHALER    Inhale 1 puff into the lungs 2 (two) times daily. Controller    FUROSEMIDE (LASIX) 80 MG TABLET    TK 1 T PO BID    GABAPENTIN (NEURONTIN) 300 MG CAPSULE    TAKE 1 CAPSULE THREE TIMES DAILY    GABAPENTIN (NEURONTIN) 600 MG TABLET    Take 1 tablet (600 mg total) by mouth 3 (three) times daily.    HYDROCODONE-ACETAMINOPHEN (NORCO)  MG PER TABLET    HYDROCODONE-ACETAMINOPHEN (Norco)  MG ORAL TAB - 1 tab po q 6 hrs prn pain    INSULIN GLARGINE (LANTUS SOLOSTAR) 100 UNIT/ML (3 ML) INPN PEN    Inject 50 Units into the skin every evening.    INSULIN SYRINGE-NEEDLE U-100 1 ML 31 GAUGE X 5/16 SYRG        LATANOPROST 0.005 % OPHTHALMIC SOLUTION    Place 1 drop into both eyes every evening.    LISINOPRIL 10 MG TABLET    Take 10 mg by mouth.    METFORMIN (GLUCOPHAGE) 1000 MG TABLET    Take 1 tablet (1,000 mg total) by mouth 2 (two) times daily with meals.    METOLAZONE (ZAROXOLYN) 2.5 MG TABLET    Take 1 tablet (2.5 mg total) by mouth once daily.    METOPROLOL SUCCINATE (TOPROL-XL) 50 MG 24 HR TABLET    TAKE 1 TABLET EVERY DAY    NAPROXEN (NAPROSYN) 500 MG TABLET    Take 1 tablet (500 mg total) by mouth 2 (two) times daily with meals. To help with pain    NITROGLYCERIN (NITROSTAT) 0.3 MG SL TABLET    PLACE 1 TABLET UNDER THE TONGUE EVERY 5 MINUTES AS NEEDED FOR CHEST PAIN, NO MORE THAN 3 TABLETS IN ONE DAY    NOVOLOG FLEXPEN 100 UNIT/ML INPN PEN    ADMINISTER 20 UNITS UNDER THE SKIN THREE TIMES DAILY WITH MEALS    PANTOPRAZOLE (PROTONIX) 40 MG TABLET    Take 1 tablet (40 mg total) by mouth once daily.    PEN NEEDLE, DIABETIC (BD INSULIN PEN NEEDLE UF SHORT) 31 GAUGE X 5/16" NDLE    Inject 1 each " "into the skin 3 (three) times daily.    POTASSIUM CHLORIDE (KLOR-CON) 10 MEQ TBSR    Take 10 mEq by mouth once.    SERTRALINE (ZOLOFT) 50 MG TABLET    TAKE 1 TABLET ONE TIME DAILY    TIOTROPIUM (SPIRIVA WITH HANDIHALER) 18 MCG INHALATION CAPSULE    Inhale 1 capsule (18 mcg total) into the lungs once daily. Controller    TRAZODONE (DESYREL) 150 MG TABLET        TRUE METRIX AIR GLUCOSE METER KIT    TEST FOUR TIMES DAILY BEFORE MEALS  AND EVERY NIGHT    VENTOLIN HFA 90 MCG/ACTUATION INHALER           Review of patient's allergies indicates:  No Known Allergies    Past Surgical History:   Procedure Laterality Date    CHOLECYSTECTOMY         Family History   Problem Relation Age of Onset    Glaucoma Mother     Cataracts Mother     Cataracts Father     Glaucoma Sister     Cataracts Sister     Glaucoma Maternal Aunt     Prostate cancer Neg Hx        Social History     Socioeconomic History    Marital status: Legally      Spouse name: Not on file    Number of children: 5    Years of education: Not on file    Highest education level: Not on file   Social Needs    Financial resource strain: Not on file    Food insecurity - worry: Not on file    Food insecurity - inability: Not on file    Transportation needs - medical: Not on file    Transportation needs - non-medical: Not on file   Occupational History    Occupation: disabled   Tobacco Use    Smoking status: Never Smoker    Smokeless tobacco: Never Used   Substance and Sexual Activity    Alcohol use: No    Drug use: No    Sexual activity: Not Currently   Other Topics Concern    Not on file   Social History Narrative    Not on file       Vitals:    08/03/18 1053   BP: (!) 154/93   Pulse: 97   Resp: 20   Weight: 93.6 kg (206 lb 5.6 oz)   Height: 5' 5" (1.651 m)   PainSc: 0-No pain       Hemoglobin A1C   Date Value Ref Range Status   02/27/2018 10.2 (H) 4.0 - 5.6 % Final     Comment:     According to ADA guidelines, hemoglobin A1c <7.0% " represents  optimal control in non-pregnant diabetic patients. Different  metrics may apply to specific patient populations.   Standards of Medical Care in Diabetes-2016.  For the purpose of screening for the presence of diabetes:  <5.7%     Consistent with the absence of diabetes  5.7-6.4%  Consistent with increasing risk for diabetes   (prediabetes)  >or=6.5%  Consistent with diabetes  Currently, no consensus exists for use of hemoglobin A1c  for diagnosis of diabetes for children.  This Hemoglobin A1c assay has significant interference with fetal   hemoglobin   (HbF). The results are invalid for patients with abnormal amounts of   HbF,   including those with known Hereditary Persistence   of Fetal Hemoglobin. Heterozygous hemoglobin variants (HbAS, HbAC,   HbAD, HbAE, HbA2) do not significantly interfere with this assay;   however, presence of multiple variants in a sample may impact the %   interference.     01/03/2018 10.3 (H) 4.0 - 5.6 % Final     Comment:     According to ADA guidelines, hemoglobin A1c <7.0% represents  optimal control in non-pregnant diabetic patients. Different  metrics may apply to specific patient populations.   Standards of Medical Care in Diabetes-2016.  For the purpose of screening for the presence of diabetes:  <5.7%     Consistent with the absence of diabetes  5.7-6.4%  Consistent with increasing risk for diabetes   (prediabetes)  >or=6.5%  Consistent with diabetes  Currently, no consensus exists for use of hemoglobin A1c  for diagnosis of diabetes for children.  This Hemoglobin A1c assay has significant interference with fetal   hemoglobin   (HbF). The results are invalid for patients with abnormal amounts of   HbF,   including those with known Hereditary Persistence   of Fetal Hemoglobin. Heterozygous hemoglobin variants (HbAS, HbAC,   HbAD, HbAE, HbA2) do not significantly interfere with this assay;   however, presence of multiple variants in a sample may impact the %    interference.     09/28/2017 9.5 (H) 4.0 - 5.6 % Final     Comment:     According to ADA guidelines, hemoglobin A1c <7.0% represents  optimal control in non-pregnant diabetic patients. Different  metrics may apply to specific patient populations.   Standards of Medical Care in Diabetes-2016.  For the purpose of screening for the presence of diabetes:  <5.7%     Consistent with the absence of diabetes  5.7-6.4%  Consistent with increasing risk for diabetes   (prediabetes)  >or=6.5%  Consistent with diabetes  Currently, no consensus exists for use of hemoglobin A1c  for diagnosis of diabetes for children.  This Hemoglobin A1c assay has significant interference with fetal   hemoglobin   (HbF). The results are invalid for patients with abnormal amounts of   HbF,   including those with known Hereditary Persistence   of Fetal Hemoglobin. Heterozygous hemoglobin variants (HbAS, HbAC,   HbAD, HbAE, HbA2) do not significantly interfere with this assay;   however, presence of multiple variants in a sample may impact the %   interference.         Review of Systems   Constitutional: Negative for chills and fever.   Respiratory: Negative for shortness of breath.    Cardiovascular: Negative for chest pain, palpitations, orthopnea, claudication and leg swelling.   Gastrointestinal: Negative for diarrhea, nausea and vomiting.   Musculoskeletal: Negative for joint pain.   Skin: Negative for rash.   Neurological: Positive for tingling and sensory change. Negative for dizziness, focal weakness and weakness.   Psychiatric/Behavioral: Negative.          Objective:      PHYSICAL EXAM: Apperance: Alert and orient in no distress,well developed, and with good attention to grooming and body habits  Patient presents ambulating in tennis shoes.   LOWER EXTREMITY EXAM:  VASCULAR: Dorsalis pedis pulses 2/4 bilateral and Posterior Tibial pulses 2/4 bilateral. Capillary fill time <3 seconds bilateral. No edema observed bilateral. Varicosities  absent bilateral. Skin temperature of the lower extremities is warm to warm, proximal to distal. Hair growth dim bilateral.  DERMATOLOGICAL: No skin rashes, subcutaneous nodules, lesions, or ulcers observed bilateral. Nails 1,2,3,4,5 bilateral elongated, thickened, and discolored with subungual debris. Webspaces 1,2,3,4clean, dry and without evidence of break in skin integrity bilateral. Dry and scaly skin noted plantarly bilateral.   NEUROLOGICAL: Light touch, sharp-dull, proprioception all present and equal bilaterally.  Vibratory sensation diminished at bilateral hallux and navicular. Protective sensation absent at sites as tested with a [place]-[employer] 5.07 monofilament.   MUSCULOSKELETAL: Muscle strength is 5/5 for foot inverters, everters, plantarflexors, and dorsiflexors. Muscle tone is normal.         Assessment:       Encounter Diagnoses   Name Primary?    Type II diabetes mellitus with neurological manifestations Yes    Dermatophytosis of nail          Plan:   Type II diabetes mellitus with neurological manifestations    Dermatophytosis of nail      I counseled the patient on his conditions, regarding findings of my examination, my impressions, and usual treatment plan.   Shoe inspection. Diabetic Foot Education. Patient reminded of the importance of good nutrition and blood sugar control to help prevent podiatric complications of diabetes. Patient instructed on proper foot hygeine. We discussed wearing proper shoe gear, daily foot inspections, never walking without protective shoe gear, never putting sharp instruments to feet.    With patient's permission, nails 1-5 bilateral were debrided/trimmed in length and thickness aggressively to their soft tissue attachment mechanically and with electric , removing all offending nail and debris. Patient relates relief following the procedure.  Patient  will continue to monitor the areas daily, inspect feet, wear protective shoe gear when ambulatory,  moisturizer to maintain skin integrity. Patient reminded of the importance of good nutrition and blood sugar control to help prevent podiatric complications of diabetes.  Patient to return 3 months or sooner if needed.              Barb Veras DPM  [facility]

## 2018-08-13 NOTE — TELEPHONE ENCOUNTER
----- Message from Kirstin Hull sent at 8/13/2018  3:26 PM CDT -----  Contact: pt  Pt called to request eye drops to be called in..[phone] (home) [phone] (work)                 ..   [facility] [id number] - [facility] [place] - [address] AT [facility]   [address] [place] [address]   Phone: [phone] Fax: [phone]

## 2018-08-16 ENCOUNTER — TELEPHONE (OUTPATIENT)
Dept: INTERNAL MEDICINE | Facility: CLINIC | Age: 63
End: 2018-08-16

## 2018-08-17 ENCOUNTER — OFFICE VISIT (OUTPATIENT)
Dept: CARDIOLOGY | Facility: CLINIC | Age: 63
End: 2018-08-17
Payer: MEDICARE

## 2018-08-17 VITALS
BODY MASS INDEX: 34.93 KG/M2 | HEART RATE: 72 BPM | WEIGHT: 209.69 LBS | HEIGHT: 65 IN | SYSTOLIC BLOOD PRESSURE: 138 MMHG | DIASTOLIC BLOOD PRESSURE: 88 MMHG

## 2018-08-17 DIAGNOSIS — I50.32 CHRONIC DIASTOLIC CONGESTIVE HEART FAILURE: ICD-10-CM

## 2018-08-17 DIAGNOSIS — G47.33 OSA ON CPAP: ICD-10-CM

## 2018-08-17 DIAGNOSIS — I48.0 PAF (PAROXYSMAL ATRIAL FIBRILLATION): ICD-10-CM

## 2018-08-17 DIAGNOSIS — I10 ESSENTIAL HYPERTENSION: ICD-10-CM

## 2018-08-17 DIAGNOSIS — R06.09 DOE (DYSPNEA ON EXERTION): Primary | ICD-10-CM

## 2018-08-17 DIAGNOSIS — I25.10 CORONARY ARTERY DISEASE INVOLVING NATIVE CORONARY ARTERY OF NATIVE HEART WITHOUT ANGINA PECTORIS: ICD-10-CM

## 2018-08-17 PROCEDURE — 99213 OFFICE O/P EST LOW 20 MIN: CPT | Mod: PBBFAC,HCNC | Performed by: INTERNAL MEDICINE

## 2018-08-17 PROCEDURE — 99999 PR PBB SHADOW E&M-EST. PATIENT-LVL III: CPT | Mod: PBBFAC,HCNC,, | Performed by: INTERNAL MEDICINE

## 2018-08-17 PROCEDURE — 99214 OFFICE O/P EST MOD 30 MIN: CPT | Mod: S$PBB,HCNC,, | Performed by: INTERNAL MEDICINE

## 2018-08-17 RX ORDER — ISOSORBIDE MONONITRATE 60 MG/1
60 TABLET, EXTENDED RELEASE ORAL DAILY
Qty: 30 TABLET | Refills: 5 | Status: SHIPPED | OUTPATIENT
Start: 2018-08-17 | End: 2018-09-13 | Stop reason: SDUPTHER

## 2018-08-17 RX ORDER — FUROSEMIDE 40 MG/1
TABLET ORAL
Qty: 90 TABLET | Refills: 11 | Status: SHIPPED | OUTPATIENT
Start: 2018-08-17 | End: 2018-10-09 | Stop reason: SDUPTHER

## 2018-08-17 NOTE — PROGRESS NOTES
Subjective:   Patient ID:  Crow Green is a 63 y.o. male who presents for follow up of Follow-up and Shortness of Breath      60 yo AAM, came in for 1-yr f/u  PMH CAD nonobstructive per LHC done in 11/16, PAF, CHFpEF 45%, DM, hTN, HLD and obesity. SANDRITA o nCPAP f/u pulm at [facility]  -11/2016, Pt was admitted to [facility] for cough with little sputum for 6 weeks. Had severe left chest pain only with cough and sitting up from the bed. No pain with exercise. Denied palpitation, dizziness, orthopnea, PND and syncope. Troponin up to 0.218 and trending down. Echo showed EF 45% and MPI showed anteroseptal positive. Subsequent LHC showed d2 30% lesion and otherwise demi Dz.   -03/2017, he was admitted to [facility] for acute cholelithiasis, s/p lap aidan. Pt had PAF during the admisssion and has been on amiodarone and Eliquis. BNP was up to 144 from 44 done five months ago.  -05/2018 MPI showed SPECT images at rest and stress studies showed a moderate-sized, moderate anteroseptal perfusion defect and a large, severe inferior wall perfusion defect that are fixed. The gated stress study demonstrated a left ventricular ejection fraction of 35%, and ECHo showed EF 50%. Chest CTA showed no PE.  Since , twice ER visits at [facility] and [facility] for chest pain and SOB. Urine drug presumptive cocaine positive but pt declined to use cocaine.   States that SOB only partially relieved few hours after diuretics. Positive orthopnea. Only walks for 10 yards. Using walker for rest.   ekg NSR and low r progression on precrodial leads.                      Past Medical History:   Diagnosis Date    Acute kidney injury     Arthritis     Asthma     Atrial fibrillation     CHF (congestive heart failure)     Depression     Diabetes mellitus, type 2 Diagnosed in 2000    Elevated PSA     Hypertension     Sleep apnea        Past Surgical History:   Procedure Laterality Date    CHOLECYSTECTOMY         Social History     Tobacco Use    Smoking  status: Never Smoker    Smokeless tobacco: Never Used   Substance Use Topics    Alcohol use: No    Drug use: No       Family History   Problem Relation Age of Onset    Glaucoma Mother     Cataracts Mother     Cataracts Father     Glaucoma Sister     Cataracts Sister     Glaucoma Maternal Aunt     Prostate cancer Neg Hx          Review of Systems   Constitution: Negative for decreased appetite, diaphoresis, fever, weakness, malaise/fatigue and night sweats.   HENT: Negative for nosebleeds.    Eyes: Negative for blurred vision and double vision.   Cardiovascular: Positive for dyspnea on exertion, orthopnea and paroxysmal nocturnal dyspnea. Negative for chest pain, claudication, irregular heartbeat, leg swelling, near-syncope, palpitations and syncope.   Respiratory: Negative for cough, shortness of breath, sleep disturbances due to breathing, snoring, sputum production and wheezing.    Endocrine: Negative for cold intolerance and polyuria.   Hematologic/Lymphatic: Does not bruise/bleed easily.   Skin: Negative for rash.   Musculoskeletal: Negative for back pain, falls, joint pain, joint swelling and neck pain.   Gastrointestinal: Positive for nausea. Negative for abdominal pain, heartburn and vomiting.   Genitourinary: Negative for dysuria, frequency and hematuria.   Neurological: Positive for headaches. Negative for difficulty with concentration, dizziness, focal weakness, light-headedness, numbness and seizures.   Psychiatric/Behavioral: Negative for depression, memory loss and substance abuse. The patient does not have insomnia.    Allergic/Immunologic: Negative for HIV exposure and hives.       Objective:   Physical Exam   Constitutional: He is oriented to person, place, and time. He appears well-nourished.   HENT:   Head: Normocephalic.   Eyes: Pupils are equal, round, and reactive to light.   Neck: Normal carotid pulses and no JVD present. Carotid bruit is not present. No thyromegaly present.    Cardiovascular: Normal rate, regular rhythm, normal heart sounds and normal pulses.  No extrasystoles are present. PMI is not displaced. Exam reveals no gallop and no S3.   No murmur heard.  Pulmonary/Chest: Breath sounds normal. No stridor. No respiratory distress.   Crackles +     Abdominal: Soft. Bowel sounds are normal. There is no tenderness. There is no rebound.   Musculoskeletal: Normal range of motion.   1+ edema   Neurological: He is alert and oriented to person, place, and time.   Skin: Skin is intact. No rash noted.   Psychiatric: His behavior is normal.       Lab Results   Component Value Date    CHOL 171 07/07/2017    CHOL 189 11/16/2016     Lab Results   Component Value Date    HDL 42 07/07/2017    HDL 42 11/16/2016     Lab Results   Component Value Date    LDLCALC 112.4 07/07/2017    LDLCALC 120.2 11/16/2016     Lab Results   Component Value Date    TRIG 83 07/07/2017    TRIG 134 11/16/2016     Lab Results   Component Value Date    CHOLHDL 24.6 07/07/2017    CHOLHDL 22.2 11/16/2016       Chemistry        Component Value Date/Time     08/06/2018 1111    K 3.7 08/06/2018 1111     08/06/2018 1111    CO2 25 08/06/2018 1111    BUN 10 08/06/2018 1111    CREATININE 0.8 08/06/2018 1111     (H) 08/06/2018 1111        Component Value Date/Time    CALCIUM 8.9 08/06/2018 1111    ALKPHOS 63 08/06/2018 1111    AST 36 08/06/2018 1111    ALT 39 08/06/2018 1111    BILITOT 0.7 08/06/2018 1111    ESTGFRAFRICA >60 08/06/2018 1111    EGFRNONAA >60 08/06/2018 1111          Lab Results   Component Value Date    HGBA1C 10.2 (H) 02/27/2018     Lab Results   Component Value Date    TSH 2.374 07/20/2017     Lab Results   Component Value Date    INR 1.0 08/06/2018    INR 1.0 03/15/2017    INR 1.0 11/16/2016     Lab Results   Component Value Date    WBC 9.58 08/06/2018    HGB 11.7 (L) 08/06/2018    HCT 36.0 (L) 08/06/2018    MCV 86 08/06/2018     08/06/2018     BMP  Sodium   Date Value Ref Range Status    08/06/2018 137 136 - 145 mmol/L Final     Potassium   Date Value Ref Range Status   08/06/2018 3.7 3.5 - 5.1 mmol/L Final     Chloride   Date Value Ref Range Status   08/06/2018 104 95 - 110 mmol/L Final     CO2   Date Value Ref Range Status   08/06/2018 25 23 - 29 mmol/L Final     BUN, Bld   Date Value Ref Range Status   08/06/2018 10 8 - 23 mg/dL Final     Creatinine   Date Value Ref Range Status   08/06/2018 0.8 0.5 - 1.4 mg/dL Final     Calcium   Date Value Ref Range Status   08/06/2018 8.9 8.7 - 10.5 mg/dL Final     Anion Gap   Date Value Ref Range Status   08/06/2018 8 8 - 16 mmol/L Final     eGFR if    Date Value Ref Range Status   08/06/2018 >60 >60 mL/min/1.73 m^2 Final     eGFR if non    Date Value Ref Range Status   08/06/2018 >60 >60 mL/min/1.73 m^2 Final     Comment:     Calculation used to obtain the estimated glomerular filtration  rate (eGFR) is the CKD-EPI equation.        BNP  @LABRCNTIP(BNP,BNPTRIAGEBLO)@  @LABRCNTIP(troponini)@  CrCl cannot be calculated (Patient's most recent lab result is older than the maximum 7 days allowed.).  No results found in the last 24 hours.  No results found in the last 24 hours.  No results found in the last 24 hours.    Assessment:      1. VARGAS (dyspnea on exertion)    2. Coronary artery disease involving native coronary artery of native heart without angina pectoris    3. PAF (paroxysmal atrial fibrillation)    4. Essential hypertension    5. Chronic diastolic congestive heart failure    6. 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 SANDRITA on CPAP      VARGAS   CHF EF 40 to 45% due to CAD. Fluid overloaded  Declined cocaine use    Plan:   Add Imdur 60 mg daily  Adjust Lasix to 80 mg in Am and 40 mg in PM  contiue metolazoen 2.5 mg daily  Check BNP and BMP in 1 week  RTC in 2 weeks  Repeat echo for EF  continue ASA,  Lipitor, BB, ACEI, amlodipine and amiodarone 200 mg daily  Elqiuis is held for lower back shot.  Daily weight. Please call the office if gain 3 pounds in 1 day or 5 pounds in 1 week.  Fluid restriction 1.5 liters a day  Na< 2 gm

## 2018-08-17 NOTE — PATIENT INSTRUCTIONS
Metolazone 2.5 mg , wait 30 minutes, taking Lasix 80 mg and then another 30 minute for breakfast;  Lasix 40 mg 2 hours after lunch.  Daily weight. Please call the office if gain 3 pounds in 1 day or 5 pounds in 1 week.  Fluid restriction 1.5 liters a day  Na< 2 gm

## 2018-08-27 ENCOUNTER — TELEPHONE (OUTPATIENT)
Dept: PAIN MEDICINE | Facility: CLINIC | Age: 63
End: 2018-08-27

## 2018-08-27 NOTE — TELEPHONE ENCOUNTER
Contacted pt. Injection rescheduled. Pre injection instructions taught and mailed. Orders entered. Pt was able to verbalize all understanding. All questions answered.//lp

## 2018-08-27 NOTE — TELEPHONE ENCOUNTER
----- Message from Kaila Vaughan sent at 8/27/2018 12:51 PM CDT -----  Contact: pt  States he needs to reschedule his procedure. Please call pt at [phone]. Thank you

## 2018-09-05 DIAGNOSIS — H40.1134 PRIMARY OPEN ANGLE GLAUCOMA OF BOTH EYES, INDETERMINATE STAGE: ICD-10-CM

## 2018-09-05 RX ORDER — LATANOPROST 50 UG/ML
1 SOLUTION/ DROPS OPHTHALMIC NIGHTLY
Qty: 1 BOTTLE | Refills: 4 | Status: SHIPPED | OUTPATIENT
Start: 2018-09-05 | End: 2019-02-28 | Stop reason: SDUPTHER

## 2018-09-05 NOTE — TELEPHONE ENCOUNTER
----- Message from Kirstin Hull sent at 9/5/2018 11:04 AM CDT -----  Contact: pt/[phone]  Need refill on Latanoprost 0.005% 1 pls send into [facility] Phone: [phone] Fax: [phone]

## 2018-09-06 ENCOUNTER — TELEPHONE (OUTPATIENT)
Dept: PAIN MEDICINE | Facility: CLINIC | Age: 63
End: 2018-09-06

## 2018-09-06 ENCOUNTER — TELEPHONE (OUTPATIENT)
Dept: INTERNAL MEDICINE | Facility: CLINIC | Age: 63
End: 2018-09-06

## 2018-09-06 RX ORDER — HYDROCODONE BITARTRATE AND ACETAMINOPHEN 10; 325 MG/1; MG/1
TABLET ORAL
Qty: 35 TABLET | Refills: 0 | OUTPATIENT
Start: 2018-09-06

## 2018-09-06 NOTE — TELEPHONE ENCOUNTER
----- Message from Roberta Rey sent at 9/6/2018  9:32 AM CDT -----  Contact: self  Requesting rx refill. Please call back at [phone].     1. What is the name of the medication you are requesting? hydrocodone  2. What is the dose? 10 mg  3. How do you take the medication? Orally, topically, etc? orally  4. How often do you take this medication? Twice a day  5. Do you need a 30 day or 90 day supply? 60  6. How many refills are you requesting? n/a  7. What is your preferred pharmacy and location of the pharmacy?     Pt uses:    [id number] - [place] - [address] AT [facility]  [address] [place] [address]  Phone: [phone] Fax: [phone]      8. Who can we contact with further questions? N/a    Thanks,   Roberta Rey

## 2018-09-06 NOTE — TELEPHONE ENCOUNTER
----- Message from Mark Sims sent at 9/6/2018  9:04 AM CDT -----  Contact: pt   Pt states he is pain and would like to have something prescribed or he will find another provider.         ..[phone]

## 2018-09-06 NOTE — TELEPHONE ENCOUNTER
"Contacted pt. Pt requesting something for pain. Informed pt that at last visit 7/19/18 pt was prescribed Norco 10/325 mg Po BID PRN (35 tabs) as a one time bridge to injection. Pt states, "what am I suppose to do in the meantime for pain?" Informed pt that he was scheduled for inj 8/22/18 tthat he no showed to. Pt states, "this is ridiculous. Im going find me another doctor!" Pt then hangs up. Appts kept for now as pt did not tell me to cancel upcoming injection and f/u//lp  "

## 2018-09-06 NOTE — TELEPHONE ENCOUNTER
----- Message from Fouzia Timmons sent at 9/6/2018  4:14 PM CDT -----  Contact: self/[phone]  Returning call, please call back at [phone]. Thanks/ar

## 2018-09-11 ENCOUNTER — TELEPHONE (OUTPATIENT)
Dept: INTERNAL MEDICINE | Facility: CLINIC | Age: 63
End: 2018-09-11

## 2018-09-11 ENCOUNTER — OFFICE VISIT (OUTPATIENT)
Dept: PULMONOLOGY | Facility: CLINIC | Age: 63
End: 2018-09-11
Payer: MEDICARE

## 2018-09-11 VITALS
RESPIRATION RATE: 18 BRPM | WEIGHT: 209.88 LBS | HEART RATE: 104 BPM | SYSTOLIC BLOOD PRESSURE: 136 MMHG | HEIGHT: 65 IN | OXYGEN SATURATION: 93 % | DIASTOLIC BLOOD PRESSURE: 82 MMHG | BODY MASS INDEX: 34.97 KG/M2

## 2018-09-11 DIAGNOSIS — G47.33 OSA ON CPAP: ICD-10-CM

## 2018-09-11 DIAGNOSIS — J45.40 MODERATE PERSISTENT ASTHMA WITHOUT COMPLICATION: Primary | ICD-10-CM

## 2018-09-11 DIAGNOSIS — J01.90 ACUTE NON-RECURRENT SINUSITIS, UNSPECIFIED LOCATION: ICD-10-CM

## 2018-09-11 DIAGNOSIS — R06.02 SHORTNESS OF BREATH ON EXERTION: ICD-10-CM

## 2018-09-11 DIAGNOSIS — J45.40 NOT WELL CONTROLLED MODERATE PERSISTENT ASTHMA: ICD-10-CM

## 2018-09-11 PROCEDURE — 99215 OFFICE O/P EST HI 40 MIN: CPT | Mod: PBBFAC,PO | Performed by: NURSE PRACTITIONER

## 2018-09-11 PROCEDURE — 3008F BODY MASS INDEX DOCD: CPT | Mod: CPTII,,, | Performed by: NURSE PRACTITIONER

## 2018-09-11 PROCEDURE — 99214 OFFICE O/P EST MOD 30 MIN: CPT | Mod: S$PBB,,, | Performed by: NURSE PRACTITIONER

## 2018-09-11 PROCEDURE — 3075F SYST BP GE 130 - 139MM HG: CPT | Mod: CPTII,,, | Performed by: NURSE PRACTITIONER

## 2018-09-11 PROCEDURE — 3079F DIAST BP 80-89 MM HG: CPT | Mod: CPTII,,, | Performed by: NURSE PRACTITIONER

## 2018-09-11 PROCEDURE — 99999 PR PBB SHADOW E&M-EST. PATIENT-LVL V: CPT | Mod: PBBFAC,,, | Performed by: NURSE PRACTITIONER

## 2018-09-11 RX ORDER — PREDNISONE 20 MG/1
TABLET ORAL
Qty: 20 TABLET | Refills: 0 | Status: SHIPPED | OUTPATIENT
Start: 2018-09-11 | End: 2018-10-09

## 2018-09-11 RX ORDER — ALBUTEROL SULFATE 90 UG/1
2 AEROSOL, METERED RESPIRATORY (INHALATION) EVERY 4 HOURS PRN
Qty: 3 INHALER | Refills: 4 | Status: SHIPPED | OUTPATIENT
Start: 2018-09-11 | End: 2019-07-29 | Stop reason: SDUPTHER

## 2018-09-11 RX ORDER — AZITHROMYCIN 250 MG/1
TABLET, FILM COATED ORAL
Qty: 6 TABLET | Refills: 0 | Status: SHIPPED | OUTPATIENT
Start: 2018-09-11 | End: 2018-10-09

## 2018-09-11 NOTE — TELEPHONE ENCOUNTER
Returned call to pt. Informed pt that he can discuss his refills with Dr. Lambert on his next appointment on 09/13/2018. Pt Voiced understanding.

## 2018-09-11 NOTE — TELEPHONE ENCOUNTER
----- Message from Dary Martinez sent at 9/11/2018  4:12 PM CDT -----  Contact: Patient  Patient called to request refills on medications that he received from [facility] in July. He would like Dr. Lambert to refill them for him.    Trazodone 150 mg - 1 tablet daily - 90 day supply    Doxepin 150 mg - 1 tablet by mouth at bedtime - 90 day supply    [facility] - [place], [place] - [address]  [address] [address] [address]  Phone: [phone] Fax: [phone]    He can be contacted at [phone].    Thanks,  Dary

## 2018-09-11 NOTE — PROGRESS NOTES
Subjective:      Patient ID: Crow Green is a 63 y.o. male.    Chief Complaint: Sleep Apnea and Asthma    HPI: Crow Green is here for 6 month follow up for SANDRITA and CPAP complaince assessment.   He is on Auto CPAP of 6-14 cmH2O pressure.  He remains not compliant with CPAP use. Complaince download today reveals 0% of days with greater than 4 hours of device use.   Patient reports some benefit from CPAP use and sleeps better, he lacks motivation to use nightly and keep on with up to bathroom does not always go back to sleep, he watches a lot of TV at night  And sleeps off and on during the day. Encouraged CPAP with naps during the day.   Patient reports still needs to obtain a new mask, does not fit right and has to hold on his face. He feels he is breathing fine and does feel he needs to wear CPAP., re instruction on need for CPAP use with obstructive sleep apnea diagnosis. Arranged for mask fitting with Linda today.     Asthma:  Patient is also here for evaluation of asthma.   He reports he feels poorly controlled with current medication regimen.  He denies wheezing or cough. Continues with chronic shortness of breath. He stopped his evening walk since legs swell if walks outside.    Current limitations in activity from asthma: some limitation. He cleans house with pacing, no longer cuts his own lawn.   Does he use a spacer with MDIs? Yes, provided spacer at previous visit, reports using.  Current medication: Advair 250 mcg, Spriva, Albuterol neb and inhaler.   ACT score: 13     Previous Report Reviewed: lab reports and office notes     Past Medical History: The following portions of the patient's history were reviewed and updated as appropriate:   He  has a past surgical history that includes Cholecystectomy; BIOPSY-ARTERY-TEMPORAL (Left, 9/1/2017); and CHOLECYSTECTOMY-LAPAROSCOPIC (N/A, 3/17/2017).  His family history includes Cataracts in his father, mother, and sister; Glaucoma in his  maternal aunt, mother, and sister.  He  reports that  has never smoked. he has never used smokeless tobacco. He reports that he does not drink alcohol or use drugs.  He has a current medication list which includes the following prescription(s): alcohol antiseptic pads, amiodarone, amlodipine, ammonium lactate, apixaban, aspirin, baclofen, doxepin, duloxetine, exenatide microspheres, fluticasone-salmeterol 250-50 mcg/dose, furosemide, gabapentin, gabapentin, hydrocodone-acetaminophen, insulin glargine, insulin syringe-needle u-100, isosorbide mononitrate, latanoprost, lisinopril, metformin, metolazone, metoprolol succinate, naproxen, nitroglycerin, novolog flexpen u-100 insulin, pantoprazole, pen needle, diabetic, potassium chloride, sertraline, tiotropium, trazodone, true metrix air glucose meter, ventolin hfa, atorvastatin, azithromycin, and prednisone.  He has No Known Allergies.    The following portions of the patient's history were reviewed and updated as appropriate: allergies, current medications, past family history, past medical history, past social history, past surgical history and problem list.    Review of Systems   Constitutional: Negative for fever, chills, weight loss, weight gain, activity change, appetite change, fatigue and night sweats.   HENT: Negative for postnasal drip, rhinorrhea, sinus pressure, voice change and congestion.    Eyes: Negative for redness and itching.   Respiratory: Negative for snoring, cough, sputum production, chest tightness, shortness of breath, wheezing, orthopnea, asthma nighttime symptoms, dyspnea on extertion, use of rescue inhaler and somnolence.    Cardiovascular: Negative.  Negative for chest pain, palpitations and leg swelling.   Genitourinary: Negative for difficulty urinating and hematuria.   Endocrine: Negative for cold intolerance and heat intolerance.    Musculoskeletal: Negative for arthralgias, gait problem, joint swelling and myalgias.   Skin: Negative.   "  Gastrointestinal: Negative for nausea, vomiting, abdominal pain and acid reflux.   Neurological: Negative for dizziness, weakness, light-headedness and headaches.   Hematological: Negative for adenopathy. No excessive bruising.   All other systems reviewed and are negative.     Objective:   /82   Pulse 104   Resp 18   Ht 5' 5" (1.651 m)   Wt 95.2 kg (209 lb 14.1 oz)   SpO2 (!) 93%   BMI 34.93 kg/m²   Physical Exam   Constitutional: He is oriented to person, place, and time. Vital signs are normal. He appears well-developed and well-nourished. He is active and cooperative.  Non-toxic appearance. He does not have a sickly appearance. He does not appear ill. No distress.   HENT:   Head: Normocephalic and atraumatic.   Right Ear: External ear normal.   Left Ear: External ear normal.   Nose: Nose normal.   Mouth/Throat: Oropharynx is clear and moist. No oropharyngeal exudate.   Eyes: Conjunctivae are normal.   Neck: Normal range of motion. Neck supple.   Cardiovascular: Normal rate, regular rhythm, normal heart sounds and intact distal pulses.   Pulmonary/Chest: Effort normal and breath sounds normal.   Abdominal: Soft.   Musculoskeletal: He exhibits no edema.   Neurological: He is alert and oriented to person, place, and time. He has normal reflexes.   Skin: Skin is warm and dry.   Psychiatric: He has a normal mood and affect. His behavior is normal. Judgment and thought content normal.   Vitals reviewed.    Personal Diagnostic Review    CPAP download  Auto CPAP 6-14 cm  Compliance Summary  7/13/2018 - 9/10/2018 (60 days)  Days with Device Usage 8 days  Days without Device Usage 52 days  Percent Days with Device Usage 13.3%  Cumulative Usage 7 hrs. 4 mins. 42 secs.  Maximum Usage (1 Day) 2 hrs. 34 mins. 50 secs.  Average Usage (All Days) 7 mins. 4 secs.  Average Usage (Days Used) 53 mins. 5 secs.  Minimum Usage (1 Day) 11 secs.  Percent of Days with Usage >= 4 Hours 0.0%  Percent of Days with Usage < 4 " Hours 100.0%  Date Range  Total Blower Time 12 hrs. 20 mins. 1 secs.  Average AHI 19.7  Auto-CPAP Summary  Auto-CPAP Mean Pressure 6.7 cmH2O  Auto-CPAP Peak Average Pressure 7.4 cmH2O  Average Device Pressure <= 90% of Time 7.6 cmH2O  Average Time in Large Leak Per Day 8 mins. 52 secs.    Pulmonary function tests: 3/2/2018 FEV1: 1.71  (67 % predicted), FVC:  2.03 (62 % predicted), FEV1/FVC:  84 (108 % predicted)  Post bronchodilator: FEV1: 1.65  (65 % predicted), FVC:  1.94 (59 % predicted), FEV1/FVC:  85 (109 % predicted).   Normal airflow.     Assessment:     1. Moderate persistent asthma without complication    2. SANDRITA on CPAP    3. Not well controlled moderate persistent asthma    4. Acute non-recurrent sinusitis, unspecified location    5. Shortness of breath on exertion      Orders Placed This Encounter   Procedures    CPAP/BIPAP SUPPLIES     90 day supply. 4 refills.     Order Specific Question:   Type of mask:     Answer:   FFM     Order Specific Question:   Headgear?     Answer:   Yes     Order Specific Question:   Tubing?     Answer:   Yes     Order Specific Question:   Humidifier chamber?     Answer:   Yes     Order Specific Question:   Chin strap?     Answer:   Yes     Order Specific Question:   Filters?     Answer:   Yes     Order Specific Question:   Length of need (1-99 months):     Answer:   99    Spirometry with/without bronchodilator     Standing Status:   Future     Standing Expiration Date:   9/11/2019    Stress test, pulmonary     Standing Status:   Future     Standing Expiration Date:   9/11/2019     Plan:     Problem List Items Addressed This Visit     Asthma - Primary    Relevant Medications    VENTOLIN HFA 90 mcg/actuation inhaler    Other Relevant Orders    Spirometry with/without bronchodilator    SANDRITA on CPAP    Relevant Orders    CPAP/BIPAP SUPPLIES      Other Visit Diagnoses     Not well controlled moderate persistent asthma        Relevant Medications    predniSONE (DELTASONE) 20 MG  tablet    Acute non-recurrent sinusitis, unspecified location        Relevant Medications    azithromycin (Z-HOLDEN) 250 MG tablet    Shortness of breath on exertion        Relevant Orders    Stress test, pulmonary         (DME - [facility]  Reviewed therapeutic goals for positive airway pressure therapy Auto CPAP  Ideal is usage 100% of nights for 6 - 8 hours per night. Minimum usage is 70% of night for at least 4 hours per night used.      Follow-up in about 3 weeks (around 10/2/2018) for Asthma after acute flare w/review mark.

## 2018-09-13 ENCOUNTER — HOSPITAL ENCOUNTER (OUTPATIENT)
Dept: CARDIOLOGY | Facility: CLINIC | Age: 63
Discharge: HOME | End: 2018-09-13
Payer: MEDICARE

## 2018-09-13 ENCOUNTER — HOSPITAL ENCOUNTER (EMERGENCY)
Facility: HOSPITAL | Age: 63
Discharge: HOME | End: 2018-09-13
Attending: EMERGENCY MEDICINE
Payer: MEDICARE

## 2018-09-13 ENCOUNTER — OFFICE VISIT (OUTPATIENT)
Dept: INTERNAL MEDICINE | Facility: CLINIC | Age: 63
End: 2018-09-13
Payer: MEDICARE

## 2018-09-13 VITALS
WEIGHT: 211 LBS | HEART RATE: 106 BPM | OXYGEN SATURATION: 93 % | BODY MASS INDEX: 35.16 KG/M2 | TEMPERATURE: 97 F | DIASTOLIC BLOOD PRESSURE: 80 MMHG | SYSTOLIC BLOOD PRESSURE: 138 MMHG | HEIGHT: 65 IN | RESPIRATION RATE: 16 BRPM

## 2018-09-13 VITALS
HEART RATE: 98 BPM | WEIGHT: 212.75 LBS | SYSTOLIC BLOOD PRESSURE: 148 MMHG | TEMPERATURE: 98 F | OXYGEN SATURATION: 96 % | BODY MASS INDEX: 35.4 KG/M2 | RESPIRATION RATE: 20 BRPM | DIASTOLIC BLOOD PRESSURE: 81 MMHG

## 2018-09-13 DIAGNOSIS — Z79.4 TYPE 2 DIABETES MELLITUS WITH MILD NONPROLIFERATIVE RETINOPATHY, WITH LONG-TERM CURRENT USE OF INSULIN, MACULAR EDEMA PRESENCE UNSPECIFIED, UNSPECIFIED LATERALITY: ICD-10-CM

## 2018-09-13 DIAGNOSIS — Z91.148 NON COMPLIANCE W MEDICATION REGIMEN: ICD-10-CM

## 2018-09-13 DIAGNOSIS — I50.32 CHRONIC DIASTOLIC CONGESTIVE HEART FAILURE: ICD-10-CM

## 2018-09-13 DIAGNOSIS — M54.32 LEFT SIDED SCIATICA: ICD-10-CM

## 2018-09-13 DIAGNOSIS — I50.9 ACUTE ON CHRONIC CONGESTIVE HEART FAILURE, UNSPECIFIED HEART FAILURE TYPE: ICD-10-CM

## 2018-09-13 DIAGNOSIS — R79.89 ELEVATED TROPONIN: ICD-10-CM

## 2018-09-13 DIAGNOSIS — Z91.89 AT RISK FOR MEDICATION NONCOMPLIANCE: ICD-10-CM

## 2018-09-13 DIAGNOSIS — R06.02 SHORTNESS OF BREATH: ICD-10-CM

## 2018-09-13 DIAGNOSIS — R07.9 CHEST PAIN: Primary | ICD-10-CM

## 2018-09-13 DIAGNOSIS — E11.3299 TYPE 2 DIABETES MELLITUS WITH MILD NONPROLIFERATIVE RETINOPATHY, WITH LONG-TERM CURRENT USE OF INSULIN, MACULAR EDEMA PRESENCE UNSPECIFIED, UNSPECIFIED LATERALITY: ICD-10-CM

## 2018-09-13 LAB
ALBUMIN SERPL BCP-MCNC: 3.1 G/DL
ALP SERPL-CCNC: 65 U/L
ALT SERPL W/O P-5'-P-CCNC: 41 U/L
ANION GAP SERPL CALC-SCNC: 9 MMOL/L
AST SERPL-CCNC: 45 U/L
BASOPHILS # BLD AUTO: 0 K/UL
BASOPHILS NFR BLD: 0 %
BILIRUB SERPL-MCNC: 0.5 MG/DL
BUN SERPL-MCNC: 12 MG/DL
CALCIUM SERPL-MCNC: 9.3 MG/DL
CHLORIDE SERPL-SCNC: 104 MMOL/L
CO2 SERPL-SCNC: 27 MMOL/L
CREAT SERPL-MCNC: 0.8 MG/DL
DIFFERENTIAL METHOD: ABNORMAL
EOSINOPHIL # BLD AUTO: 0.1 K/UL
EOSINOPHIL NFR BLD: 1.1 %
ERYTHROCYTE [DISTWIDTH] IN BLOOD BY AUTOMATED COUNT: 15.1 %
EST. GFR  (AFRICAN AMERICAN): >60 ML/MIN/1.73 M^2
EST. GFR  (NON AFRICAN AMERICAN): >60 ML/MIN/1.73 M^2
GLUCOSE SERPL-MCNC: 117 MG/DL
HCT VFR BLD AUTO: 37.4 %
HGB BLD-MCNC: 11.9 G/DL
LYMPHOCYTES # BLD AUTO: 1.8 K/UL
LYMPHOCYTES NFR BLD: 21.7 %
MCH RBC QN AUTO: 27 PG
MCHC RBC AUTO-ENTMCNC: 31.8 G/DL
MCV RBC AUTO: 85 FL
MONOCYTES # BLD AUTO: 0.7 K/UL
MONOCYTES NFR BLD: 8.7 %
NEUTROPHILS # BLD AUTO: 5.7 K/UL
NEUTROPHILS NFR BLD: 68.3 %
PLATELET # BLD AUTO: 168 K/UL
PMV BLD AUTO: 10.7 FL
POTASSIUM SERPL-SCNC: 3.5 MMOL/L
PROT SERPL-MCNC: 7.4 G/DL
RBC # BLD AUTO: 4.4 M/UL
SODIUM SERPL-SCNC: 140 MMOL/L
TROPONIN I SERPL DL<=0.01 NG/ML-MCNC: 0.38 NG/ML
WBC # BLD AUTO: 8.38 K/UL

## 2018-09-13 PROCEDURE — 80053 COMPREHEN METABOLIC PANEL: CPT

## 2018-09-13 PROCEDURE — 93010 ELECTROCARDIOGRAM REPORT: CPT | Mod: 76,,, | Performed by: INTERNAL MEDICINE

## 2018-09-13 PROCEDURE — 93010 ELECTROCARDIOGRAM REPORT: CPT | Mod: ,,, | Performed by: INTERNAL MEDICINE

## 2018-09-13 PROCEDURE — 93005 ELECTROCARDIOGRAM TRACING: CPT

## 2018-09-13 PROCEDURE — 3046F HEMOGLOBIN A1C LEVEL >9.0%: CPT | Mod: CPTII,,, | Performed by: FAMILY MEDICINE

## 2018-09-13 PROCEDURE — 84484 ASSAY OF TROPONIN QUANT: CPT

## 2018-09-13 PROCEDURE — 3079F DIAST BP 80-89 MM HG: CPT | Mod: CPTII,,, | Performed by: FAMILY MEDICINE

## 2018-09-13 PROCEDURE — 63600175 PHARM REV CODE 636 W HCPCS: Performed by: EMERGENCY MEDICINE

## 2018-09-13 PROCEDURE — 93005 ELECTROCARDIOGRAM TRACING: CPT | Mod: PBBFAC,PO | Performed by: FAMILY MEDICINE

## 2018-09-13 PROCEDURE — 90686 IIV4 VACC NO PRSV 0.5 ML IM: CPT | Mod: PBBFAC,PO

## 2018-09-13 PROCEDURE — 99215 OFFICE O/P EST HI 40 MIN: CPT | Mod: S$PBB,,, | Performed by: FAMILY MEDICINE

## 2018-09-13 PROCEDURE — 3008F BODY MASS INDEX DOCD: CPT | Mod: CPTII,,, | Performed by: FAMILY MEDICINE

## 2018-09-13 PROCEDURE — 96374 THER/PROPH/DIAG INJ IV PUSH: CPT

## 2018-09-13 PROCEDURE — 3075F SYST BP GE 130 - 139MM HG: CPT | Mod: CPTII,,, | Performed by: FAMILY MEDICINE

## 2018-09-13 PROCEDURE — 99213 OFFICE O/P EST LOW 20 MIN: CPT | Mod: PBBFAC,PO | Performed by: FAMILY MEDICINE

## 2018-09-13 PROCEDURE — 99900035 HC TECH TIME PER 15 MIN (STAT)

## 2018-09-13 PROCEDURE — 99284 EMERGENCY DEPT VISIT MOD MDM: CPT | Mod: 25,27

## 2018-09-13 PROCEDURE — 99999 PR PBB SHADOW E&M-EST. PATIENT-LVL III: CPT | Mod: PBBFAC,,, | Performed by: FAMILY MEDICINE

## 2018-09-13 PROCEDURE — 85025 COMPLETE CBC W/AUTO DIFF WBC: CPT

## 2018-09-13 PROCEDURE — 25000003 PHARM REV CODE 250: Performed by: EMERGENCY MEDICINE

## 2018-09-13 RX ORDER — DOXEPIN HYDROCHLORIDE 150 MG/1
CAPSULE ORAL
Status: CANCELLED | OUTPATIENT
Start: 2018-09-13

## 2018-09-13 RX ORDER — HYDROCODONE BITARTRATE AND ACETAMINOPHEN 10; 325 MG/1; MG/1
1 TABLET ORAL
Status: COMPLETED | OUTPATIENT
Start: 2018-09-13 | End: 2018-09-13

## 2018-09-13 RX ORDER — TRAZODONE HYDROCHLORIDE 150 MG/1
150 TABLET ORAL NIGHTLY
Qty: 90 TABLET | Refills: 1 | Status: SHIPPED | OUTPATIENT
Start: 2018-09-13 | End: 2019-02-20 | Stop reason: SDUPTHER

## 2018-09-13 RX ORDER — SPIRONOLACTONE 25 MG/1
25 TABLET ORAL DAILY
COMMUNITY
End: 2018-10-09

## 2018-09-13 RX ORDER — GLIMEPIRIDE 2 MG/1
2 TABLET ORAL
COMMUNITY
End: 2018-09-13 | Stop reason: SDUPTHER

## 2018-09-13 RX ORDER — METOLAZONE 2.5 MG/1
2.5 TABLET ORAL DAILY
Qty: 90 TABLET | Refills: 3 | Status: SHIPPED | OUTPATIENT
Start: 2018-09-13 | End: 2019-08-15 | Stop reason: SDUPTHER

## 2018-09-13 RX ORDER — METFORMIN HYDROCHLORIDE 1000 MG/1
1000 TABLET ORAL 2 TIMES DAILY WITH MEALS
Qty: 180 TABLET | Refills: 3 | Status: SHIPPED | OUTPATIENT
Start: 2018-09-13 | End: 2018-10-09 | Stop reason: SDUPTHER

## 2018-09-13 RX ORDER — GLIMEPIRIDE 2 MG/1
2 TABLET ORAL
Qty: 90 TABLET | Refills: 3 | Status: SHIPPED | OUTPATIENT
Start: 2018-09-13 | End: 2018-10-09 | Stop reason: SDUPTHER

## 2018-09-13 RX ORDER — HYDROCODONE BITARTRATE AND ACETAMINOPHEN 10; 325 MG/1; MG/1
TABLET ORAL
Qty: 35 TABLET | Refills: 0 | Status: SHIPPED | OUTPATIENT
Start: 2018-09-13 | End: 2018-10-09 | Stop reason: SDUPTHER

## 2018-09-13 RX ORDER — SPIRONOLACTONE 25 MG/1
25 TABLET ORAL DAILY
COMMUNITY
End: 2018-09-13

## 2018-09-13 RX ORDER — LISINOPRIL 10 MG/1
10 TABLET ORAL DAILY
Qty: 90 TABLET | Refills: 3 | Status: SHIPPED | OUTPATIENT
Start: 2018-09-13 | End: 2019-06-27 | Stop reason: SDUPTHER

## 2018-09-13 RX ORDER — METOPROLOL TARTRATE 25 MG/1
50 TABLET, FILM COATED ORAL
Status: COMPLETED | OUTPATIENT
Start: 2018-09-13 | End: 2018-09-13

## 2018-09-13 RX ORDER — FUROSEMIDE 10 MG/ML
80 INJECTION INTRAMUSCULAR; INTRAVENOUS
Status: COMPLETED | OUTPATIENT
Start: 2018-09-13 | End: 2018-09-13

## 2018-09-13 RX ORDER — ISOSORBIDE MONONITRATE 60 MG/1
60 TABLET, EXTENDED RELEASE ORAL DAILY
Qty: 90 TABLET | Refills: 3 | Status: SHIPPED | OUTPATIENT
Start: 2018-09-13 | End: 2020-08-18 | Stop reason: SDUPTHER

## 2018-09-13 RX ORDER — POTASSIUM CHLORIDE 1500 MG/1
20 TABLET, EXTENDED RELEASE ORAL DAILY
Qty: 90 TABLET | Refills: 3 | Status: SHIPPED | OUTPATIENT
Start: 2018-09-13 | End: 2018-10-09 | Stop reason: SDUPTHER

## 2018-09-13 RX ADMIN — HYDROCODONE BITARTRATE AND ACETAMINOPHEN 1 TABLET: 10; 325 TABLET ORAL at 02:09

## 2018-09-13 RX ADMIN — FUROSEMIDE 80 MG: 10 INJECTION, SOLUTION INTRAMUSCULAR; INTRAVENOUS at 02:09

## 2018-09-13 RX ADMIN — METOPROLOL TARTRATE 50 MG: 25 TABLET, FILM COATED ORAL at 02:09

## 2018-09-13 NOTE — ASSESSMENT & PLAN NOTE
He has history of elevated troponin however I repeated the troponin today is double what it had been before.  Considering this and is past medical history along with current findings of chest pain I am sending to the emergency room for rule out of acute coronary syndrome.  At minimum he will need to be trended on the troponins.

## 2018-09-13 NOTE — ED PROVIDER NOTES
Encounter Date: 9/13/2018       History     Chief Complaint   Patient presents with    Chest Pain     since last night plus SOB.      Patient is a 63-year-old male with a past medical history as described below, who presents today with complaints of dyspnea on exertion for 3 months.  He does report some worsening over the past few days.  Does report episode of chest pain last night as well that has resolved.  Chest pain was nonexertional.  Patient is unclear of any of the medications he is taking.  He does however have a bag of medications and states that he has someone that helps him at home.  When questioned about his furosemide dosing, patient is not sure if he is taking it.  He thinks he may be taking 20 mg.  Per chart review, patient is scheduled to take 80 mg of Lasix in the morning and 40 mg at night.  Chart review also reveals that his primary care physician spent a good deal of time this morning at their visit going over his medications and arranging it is that patient knows which medicines to take when.  Looking in the patient's back, patient does have 40 mg furosemide tablets.  Patient did have an elevated troponin at his primary care visit this morning.  Patient was sent here for evaluation and to trend troponins.  Patient is the main complaint in the ER is his right leg pain.  He states this is a chronic issue for which she takes hydrocodone for and is requesting his hydrocodone.  Denies any active shortness of breath, chest pain, pedal edema, cough, fever/chills, nausea/vomiting, diaphoresis, leg swelling.          Review of patient's allergies indicates:  No Known Allergies  Past Medical History:   Diagnosis Date    Acute kidney injury     Arthritis     Asthma     Atrial fibrillation     CHF (congestive heart failure)     Depression     Diabetes mellitus, type 2 Diagnosed in 2000    Elevated PSA     Hypertension     Sleep apnea      Past Surgical History:   Procedure Laterality Date     BIOPSY-ARTERY-TEMPORAL Left 9/1/2017    Performed by Torin Bishop MD at [facility] OR    CHOLECYSTECTOMY      CHOLECYSTECTOMY-LAPAROSCOPIC N/A 3/17/2017    Performed by Louis O. Jeansonne IV, MD at [facility] OR     Family History   Problem Relation Age of Onset    Glaucoma Mother     Cataracts Mother     Cataracts Father     Glaucoma Sister     Cataracts Sister     Glaucoma Maternal Aunt     Prostate cancer Neg Hx      Social History     Tobacco Use    Smoking status: Never Smoker    Smokeless tobacco: Never Used   Substance Use Topics    Alcohol use: No    Drug use: No     Review of Systems   Constitutional: Negative for chills, diaphoresis and fever.   HENT: Negative for congestion, rhinorrhea and sore throat.    Eyes: Negative for pain, redness and visual disturbance.   Respiratory: Positive for shortness of breath (x 3 months). Negative for cough.    Cardiovascular: Positive for chest pain (last night). Negative for palpitations and leg swelling.   Gastrointestinal: Negative for abdominal distention, abdominal pain, blood in stool, constipation, diarrhea, nausea and vomiting.   Genitourinary: Negative for dysuria and hematuria.   Musculoskeletal: Positive for arthralgias (chronic RLE pain). Negative for joint swelling.   Skin: Negative for rash and wound.   Neurological: Negative for seizures, syncope and headaches.   All other systems reviewed and are negative.      Physical Exam     Initial Vitals   BP Pulse Resp Temp SpO2   09/13/18 1120 09/13/18 1115 09/13/18 1120 09/13/18 1120 09/13/18 1120   (!) 142/95 98 18 98.2 °F (36.8 °C) 97 %      MAP       --                Physical Exam    Nursing note and vitals reviewed.  Constitutional: He appears well-developed and well-nourished. No distress.   HENT:   Head: Normocephalic and atraumatic.   Mouth/Throat: Oropharynx is clear and moist.   Eyes: Conjunctivae and EOM are normal. Pupils are equal, round, and reactive to light.   Neck: Neck supple. No tracheal  deviation present.   Cardiovascular: Normal heart sounds and intact distal pulses.   Afib with rate 100   Pulmonary/Chest: Breath sounds normal. No respiratory distress.   Abdominal: Soft. He exhibits no distension. There is no tenderness. There is no rebound and no guarding.   Musculoskeletal: Normal range of motion. He exhibits no edema or tenderness.   No unilateral leg width discrepancy    Neurological: He is alert and oriented to person, place, and time.   No focal deficits   Skin: Skin is warm. No rash noted. No erythema.   Psychiatric: He has a normal mood and affect. His behavior is normal.         ED Course   Procedures  Labs Reviewed   CBC W/ AUTO DIFFERENTIAL - Abnormal; Notable for the following components:       Result Value    RBC 4.40 (*)     Hemoglobin 11.9 (*)     Hematocrit 37.4 (*)     MCHC 31.8 (*)     RDW 15.1 (*)     All other components within normal limits   COMPREHENSIVE METABOLIC PANEL - Abnormal; Notable for the following components:    Glucose 117 (*)     Albumin 3.1 (*)     AST 45 (*)     All other components within normal limits   TROPONIN I - Abnormal; Notable for the following components:    Troponin I 0.378 (*)     All other components within normal limits     Results for orders placed or performed during the hospital encounter of 09/13/18   CBC auto differential   Result Value Ref Range    WBC 8.38 3.90 - 12.70 K/uL    RBC 4.40 (L) 4.60 - 6.20 M/uL    Hemoglobin 11.9 (L) 14.0 - 18.0 g/dL    Hematocrit 37.4 (L) 40.0 - 54.0 %    MCV 85 82 - 98 fL    MCH 27.0 27.0 - 31.0 pg    MCHC 31.8 (L) 32.0 - 36.0 g/dL    RDW 15.1 (H) 11.5 - 14.5 %    Platelets 168 150 - 350 K/uL    MPV 10.7 9.2 - 12.9 fL    Gran # (ANC) 5.7 1.8 - 7.7 K/uL    Lymph # 1.8 1.0 - 4.8 K/uL    Mono # 0.7 0.3 - 1.0 K/uL    Eos # 0.1 0.0 - 0.5 K/uL    Baso # 0.00 0.00 - 0.20 K/uL    Gran% 68.3 38.0 - 73.0 %    Lymph% 21.7 18.0 - 48.0 %    Mono% 8.7 4.0 - 15.0 %    Eosinophil% 1.1 0.0 - 8.0 %    Basophil% 0.0 0.0 - 1.9 %     Differential Method Automated    Comprehensive metabolic panel   Result Value Ref Range    Sodium 140 136 - 145 mmol/L    Potassium 3.5 3.5 - 5.1 mmol/L    Chloride 104 95 - 110 mmol/L    CO2 27 23 - 29 mmol/L    Glucose 117 (H) 70 - 110 mg/dL    BUN, Bld 12 8 - 23 mg/dL    Creatinine 0.8 0.5 - 1.4 mg/dL    Calcium 9.3 8.7 - 10.5 mg/dL    Total Protein 7.4 6.0 - 8.4 g/dL    Albumin 3.1 (L) 3.5 - 5.2 g/dL    Total Bilirubin 0.5 0.1 - 1.0 mg/dL    Alkaline Phosphatase 65 55 - 135 U/L    AST 45 (H) 10 - 40 U/L    ALT 41 10 - 44 U/L    Anion Gap 9 8 - 16 mmol/L    eGFR if African American >60.0 >60 mL/min/1.73 m^2    eGFR if non African American >60.0 >60 mL/min/1.73 m^2   Troponin I   Result Value Ref Range    Troponin I 0.378 (H) 0.000 - 0.026 ng/mL            Imaging Results          X-Ray Chest AP Portable (Final result)  Result time 09/13/18 11:46:04    Final result by DARELL Bose Sr., MD (09/13/18 11:46:04)                 Impression:      1. There has been interval development of a mild amount of interstitial and alveolar opacities seen in both lungs.  This is characteristic of pulmonary edema.  2. The size of the heart is prominent.  This may be secondary to magnification.  .      Electronically signed by: Vimal Bose MD  Date:    09/13/2018  Time:    11:46             Narrative:    EXAMINATION:  XR CHEST AP PORTABLE    CLINICAL HISTORY:  chest pain;    COMPARISON:  08/06/2018    FINDINGS:  The size of the heart is prominent.  There has been interval development of a mild amount of interstitial and alveolar opacities seen in both lungs.  There is no pneumothorax.  The costophrenic angles are sharp.                                  Medications   furosemide injection 80 mg (80 mg Intravenous Given 9/13/18 1420)   HYDROcodone-acetaminophen  mg per tablet 1 tablet (1 tablet Oral Given 9/13/18 1416)   metoprolol tartrate (LOPRESSOR) tablet 50 mg (50 mg Oral Given 9/13/18 1417)       Medical Decision  Making:   Patient presented with shortness of breath medication noncompliance with his Lasix for CHF; x-ray shows mild fluid in his lungs bilaterally; no hypoxia; patient given does IV Lasix here and educated at length about the importance of Lasix compliance at home; patient was sent here for an elevation in troponin to rule out ACS; patient's symptoms seem more consistent with CHF rather than ACS; repeat troponin lower here in the emergency department as compared to his earlier troponin this morning at primary care appointment, arguing against acute coronary event; of note, patient has chronic elevation troponin when previous labs were reviewed; patient stable for discharge at this point; patient discharged home with PCP f/u and ER return precautions provided  EKG:  Rate 97, normal sinus rhythm, left axis deviation, normal p.r.n. QRS interval, , no significant new ST-T segment abnormalities when compared to prior EKG                    Clinical Impression:   Diagnosis:  CHF exacerbation, right leg pain, shortness of breath, medication noncompliance  Disposition:  Discharged home in stable condition  Prescriptions:  No new prescriptions provided  Follow-up Instructions:  Follow up with primary care physician in 1 day.  Return to the ER for any new worsening symptoms                           Devon Carlisle MD  09/14/18 [id number]

## 2018-09-13 NOTE — DISCHARGE INSTRUCTIONS
Make sure you are taking your medications as prescribed including 80 mg of furosemide in the mornings and 40 in the evenings

## 2018-09-13 NOTE — PROGRESS NOTES
Subjective:       Patient ID: Crow Green is a 63 y.o. male.    Chief Complaint: Shortness of Breath and Medication Refill    HPI  Came in today with chief complaint of shortness of breath and mild chest pain.  He said it started last night with feeling more short of breath and he had been for the last few weeks.  He does not have any nausea or vomiting but does say that the pain radiates to his back somewhat.  No radiation of pain down his arm.  Has had similar issues in the past.  Has a chronic history of challenges with compliance with his medication.  Recently saw Cardiology just over a month ago and was started on some new medications.  It is not clear whether he has been taking those as prescribed.    Today we spent significant amount of time organizing all of his medication and putting him in to pill boxes so he is set up for the next 2 weeks.    Family History   Problem Relation Age of Onset    Glaucoma Mother     Cataracts Mother     Cataracts Father     Glaucoma Sister     Cataracts Sister     Glaucoma Maternal Aunt     Prostate cancer Neg Hx        Current Outpatient Medications:     ALCOHOL ANTISEPTIC PADS (ALCOHOL PREP PADS TOP), , Disp: , Rfl:     amLODIPine (NORVASC) 10 MG tablet, TAKE 1 TABLET ONE TIME DAILY, Disp: 90 tablet, Rfl: 3    atorvastatin (LIPITOR) 40 MG tablet, TAKE 1 TABLET EVERY EVENING, Disp: 90 tablet, Rfl: 3    furosemide (LASIX) 40 MG tablet, Take 2 tablets (80 mg total) by mouth every morning AND 1 tablet (40 mg total) every evening., Disp: 90 tablet, Rfl: 11    gabapentin (NEURONTIN) 600 MG tablet, Take 1 tablet (600 mg total) by mouth 3 (three) times daily., Disp: 90 tablet, Rfl: 1    glimepiride (AMARYL) 2 MG tablet, Take 1 tablet (2 mg total) by mouth before breakfast., Disp: 90 tablet, Rfl: 3    insulin syringe-needle U-100 1 mL 31 gauge x 5/16 Syrg, , Disp: , Rfl:     isosorbide mononitrate (IMDUR) 60 MG 24 hr tablet, Take 1 tablet (60 mg total) by  "mouth once daily., Disp: 90 tablet, Rfl: 3    latanoprost 0.005 % ophthalmic solution, Place 1 drop into both eyes every evening., Disp: 1 Bottle, Rfl: 4    lisinopril 10 MG tablet, Take 1 tablet (10 mg total) by mouth once daily., Disp: 90 tablet, Rfl: 3    metFORMIN (GLUCOPHAGE) 1000 MG tablet, Take 1 tablet (1,000 mg total) by mouth 2 (two) times daily with meals., Disp: 180 tablet, Rfl: 3    metOLazone (ZAROXOLYN) 2.5 MG tablet, Take 1 tablet (2.5 mg total) by mouth once daily., Disp: 90 tablet, Rfl: 3    metoprolol succinate (TOPROL-XL) 50 MG 24 hr tablet, TAKE 1 TABLET EVERY DAY, Disp: 90 tablet, Rfl: 5    nitroGLYCERIN (NITROSTAT) 0.3 MG SL tablet, PLACE 1 TABLET UNDER THE TONGUE EVERY 5 MINUTES AS NEEDED FOR CHEST PAIN, NO MORE THAN 3 TABLETS IN ONE DAY, Disp: 100 tablet, Rfl: 0    NOVOLOG FLEXPEN 100 unit/mL InPn pen, ADMINISTER 20 UNITS UNDER THE SKIN THREE TIMES DAILY WITH MEALS, Disp: 15 mL, Rfl: 0    pantoprazole (PROTONIX) 40 MG tablet, Take 1 tablet (40 mg total) by mouth once daily., Disp: 30 tablet, Rfl: 11    pen needle, diabetic (BD INSULIN PEN NEEDLE UF SHORT) 31 gauge x 5/16" Ndle, Inject 1 each into the skin 3 (three) times daily., Disp: 90 each, Rfl: 11    potassium chloride (K-TAB) 20 mEq, Take 1 tablet (20 mEq total) by mouth once daily. for 90 doses, Disp: 90 tablet, Rfl: 3    predniSONE (DELTASONE) 20 MG tablet, 3 daily x 3 days, 2 daily x 3 days, 1 daily x 3 days, 1/2 daily x 4 days., Disp: 20 tablet, Rfl: 0    sertraline (ZOLOFT) 50 MG tablet, TAKE 1 TABLET ONE TIME DAILY, Disp: 90 tablet, Rfl: 3    TRUE METRIX AIR GLUCOSE METER kit, TEST FOUR TIMES DAILY BEFORE MEALS  AND EVERY NIGHT, Disp: 1 each, Rfl: 0    VENTOLIN HFA 90 mcg/actuation inhaler, Inhale 2 puffs into the lungs every 4 (four) hours as needed for Wheezing., Disp: 3 Inhaler, Rfl: 4    ammonium lactate 12 % Crea, Apply 1 Act topically 2 (two) times daily. (Patient taking differently: Apply 1 Act topically once " "daily. ), Disp: 1 Tube, Rfl: 3    apixaban 5 mg Tab, Take 1 tablet (5 mg total) by mouth 2 (two) times daily., Disp: 60 tablet, Rfl: 1    aspirin (ECOTRIN) 81 MG EC tablet, Take 1 tablet (81 mg total) by mouth once daily., Disp: , Rfl: 0    azithromycin (Z-HOLDEN) 250 MG tablet, Take 2 tablets by mouth on day 1; Take 1 tablet by mouth on days 2-5, Disp: 6 tablet, Rfl: 0    exenatide microspheres (BYDUREON BCISE) 2 mg/0.85 mL AtIn, Inject 2 mg into the skin every 7 days., Disp: 4 Syringe, Rfl: 11    fluticasone-salmeterol 250-50 mcg/dose (ADVAIR DISKUS) 250-50 mcg/dose diskus inhaler, Inhale 1 puff into the lungs 2 (two) times daily. Controller, Disp: 60 each, Rfl: 11    HYDROcodone-acetaminophen (NORCO)  mg per tablet, HYDROCODONE-ACETAMINOPHEN (Norco)  MG ORAL TAB - 1 tab po q 6 hrs prn pain, Disp: 35 tablet, Rfl: 0    insulin glargine (LANTUS SOLOSTAR) 100 unit/mL (3 mL) InPn pen, Inject 50 Units into the skin every evening. (Patient taking differently: Inject 60 Units into the skin every evening. ), Disp: 2 Box, Rfl: 11    tiotropium (SPIRIVA WITH HANDIHALER) 18 mcg inhalation capsule, Inhale 1 capsule (18 mcg total) into the lungs once daily. Controller, Disp: 90 capsule, Rfl: 3    traZODone (DESYREL) 150 MG tablet, Take 1 tablet (150 mg total) by mouth nightly., Disp: 90 tablet, Rfl: 1    Review of Systems   Constitutional: Negative for chills and fever.   Eyes: Negative for visual disturbance.   Respiratory: Positive for shortness of breath. Negative for cough.    Cardiovascular: Positive for chest pain.   Gastrointestinal: Negative for abdominal pain.   Neurological: Negative for dizziness.       Objective:   /80 (BP Location: Right arm, Patient Position: Sitting, BP Method: Medium (Manual))   Pulse 106   Temp 96.6 °F (35.9 °C) (Tympanic)   Resp 16   Ht 5' 5" (1.651 m)   Wt 95.7 kg (210 lb 15.7 oz)   SpO2 (!) 93%   BMI 35.11 kg/m²      Physical Exam   Constitutional: He is " oriented to person, place, and time. He appears well-developed and well-nourished. He appears distressed (Mild distress.).   HENT:   Head: Normocephalic and atraumatic.   Nose: Nose normal.   Eyes: Conjunctivae and EOM are normal. Pupils are equal, round, and reactive to light. Right eye exhibits no discharge. Left eye exhibits no discharge.   Neck: No thyromegaly present.   Cardiovascular: Normal rate, regular rhythm and normal heart sounds.   No murmur heard.  Pulmonary/Chest: Effort normal and breath sounds normal. No respiratory distress. He has no wheezes.   Abdominal: Soft. He exhibits no distension.   Musculoskeletal: He exhibits no edema.   Neurological: He is alert and oriented to person, place, and time.   Skin: Skin is warm. No rash noted. He is not diaphoretic.   Psychiatric: He has a normal mood and affect. His behavior is normal.   Vitals reviewed.      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  Considering this and is past medical history along with current findings of chest pain I am sending to the emergency room for rule out of acute coronary syndrome.  At minimum he will need to be trended on the troponins.         Relevant Orders    IN OFFICE EKG 12-LEAD (to [facility])    Brain natriuretic peptide (Completed)    Comprehensive metabolic panel    Troponin I (Completed)       Orthopedic    Left sided sciatica    Current Assessment & Plan     Continue follow-up with Dr. Tsai.  Sent refill for  pain medication         Relevant Orders    SUBSEQUENT HOME HEALTH ORDERS       Other    At risk for medication noncompliance    Current Assessment & Plan     Spent significant amount of time today organizing medication         Relevant Orders    SUBSEQUENT HOME HEALTH ORDERS      Other Visit Diagnoses     Type 2 diabetes mellitus with mild nonproliferative retinopathy, with long-term current use of insulin, macular edema presence unspecified, unspecified laterality        Relevant Medications    metFORMIN (GLUCOPHAGE) 1000 MG tablet            No Follow-up on file.

## 2018-09-13 NOTE — ED NOTES
Per patient's request, [facility] was called to come  patient. Someone will be dispatched.

## 2018-09-17 ENCOUNTER — TELEPHONE (OUTPATIENT)
Dept: PHARMACY | Facility: CLINIC | Age: 63
End: 2018-09-17

## 2018-09-17 ENCOUNTER — OFFICE VISIT (OUTPATIENT)
Dept: CARDIOLOGY | Facility: CLINIC | Age: 63
End: 2018-09-17
Payer: MEDICARE

## 2018-09-17 VITALS
SYSTOLIC BLOOD PRESSURE: 108 MMHG | HEIGHT: 65 IN | DIASTOLIC BLOOD PRESSURE: 66 MMHG | HEART RATE: 82 BPM | BODY MASS INDEX: 33.94 KG/M2 | WEIGHT: 203.69 LBS

## 2018-09-17 DIAGNOSIS — I50.22 CHRONIC SYSTOLIC CONGESTIVE HEART FAILURE: Primary | ICD-10-CM

## 2018-09-17 DIAGNOSIS — E11.42 DIABETIC POLYNEUROPATHY ASSOCIATED WITH TYPE 2 DIABETES MELLITUS: ICD-10-CM

## 2018-09-17 DIAGNOSIS — I10 ESSENTIAL HYPERTENSION: ICD-10-CM

## 2018-09-17 DIAGNOSIS — I25.10 CORONARY ARTERY DISEASE INVOLVING NATIVE CORONARY ARTERY OF NATIVE HEART WITHOUT ANGINA PECTORIS: ICD-10-CM

## 2018-09-17 DIAGNOSIS — I48.0 PAF (PAROXYSMAL ATRIAL FIBRILLATION): ICD-10-CM

## 2018-09-17 PROCEDURE — 99213 OFFICE O/P EST LOW 20 MIN: CPT | Mod: PBBFAC | Performed by: INTERNAL MEDICINE

## 2018-09-17 PROCEDURE — 3045F PR MOST RECENT HEMOGLOBIN A1C LEVEL 7.0-9.0%: CPT | Mod: CPTII,,, | Performed by: INTERNAL MEDICINE

## 2018-09-17 PROCEDURE — 3008F BODY MASS INDEX DOCD: CPT | Mod: CPTII,,, | Performed by: INTERNAL MEDICINE

## 2018-09-17 PROCEDURE — 99999 PR PBB SHADOW E&M-EST. PATIENT-LVL III: CPT | Mod: PBBFAC,,, | Performed by: INTERNAL MEDICINE

## 2018-09-17 PROCEDURE — 3078F DIAST BP <80 MM HG: CPT | Mod: CPTII,,, | Performed by: INTERNAL MEDICINE

## 2018-09-17 PROCEDURE — 3074F SYST BP LT 130 MM HG: CPT | Mod: CPTII,,, | Performed by: INTERNAL MEDICINE

## 2018-09-17 PROCEDURE — 99214 OFFICE O/P EST MOD 30 MIN: CPT | Mod: S$PBB,,, | Performed by: INTERNAL MEDICINE

## 2018-09-17 RX ORDER — BACLOFEN 10 MG/1
10 TABLET ORAL DAILY
COMMUNITY
End: 2018-09-25 | Stop reason: SDUPTHER

## 2018-09-17 NOTE — PROGRESS NOTES
Subjective:   Patient ID:  Crow Green is a 63 y.o. male who presents for follow up of Hospital Follow Up; Congestive Heart Failure; and Fatigue      60 yo AAM, came in for 1-yr f/u  PMH CAD nonobstructive per LHC done in 11/16, PAF, CHFpEF 45%, DM, hTN, HLD and obesity. SANDRITA o nCPAP f/u pulm at [facility]  -11/2016, Pt was admitted to [facility] for cough with little sputum for 6 weeks. Had severe left chest pain only with cough and sitting up from the bed. No pain with exercise. Denied palpitation, dizziness, orthopnea, PND and syncope. Troponin up to 0.218 and trending down. Echo showed EF 45% and MPI showed anteroseptal positive. Subsequent LHC showed d2 30% lesion and otherwise demi Dz.   -03/2017, he was admitted to [facility] for acute cholelithiasis, s/p lap aidan. Pt had PAF during the admisssion and has been on amiodarone and Eliquis. BNP was up to 144 from 44 done five months ago.  -05/2018 at [facility], MPI showed SPECT images at rest and stress studies showed a moderate-sized, moderate anteroseptal perfusion defect and a large, severe inferior wall perfusion defect that are fixed. The gated stress study demonstrated a left ventricular ejection fraction of 35%, and ECHo showed EF 50%. Chest CTA showed no PE.  Since , twice ER visits at [facility] and [facility] for chest pain and SOB. Urine drug presumptive cocaine positive but pt declined to use cocaine.   Hand shaking and dropped stuff from his hand for few days. Using a lot for walker due to the leg weakness and imbalance.   Dyspnea stable. No chest pain. Can climb 6 stairs and walks a city block before stopped to catch the breathing.   .                       Past Medical History:   Diagnosis Date    Acute kidney injury     Arthritis     Asthma     Atrial fibrillation     CHF (congestive heart failure)     Depression     Diabetes mellitus, type 2 Diagnosed in 2000    Elevated PSA     Hypertension     Sleep apnea        Past Surgical History:    Procedure Laterality Date    BIOPSY-ARTERY-TEMPORAL Left 9/1/2017    Performed by Torin Bishop MD at [facility] OR    CHOLECYSTECTOMY      CHOLECYSTECTOMY-LAPAROSCOPIC N/A 3/17/2017    Performed by Louis O. Jeansonne IV, MD at [facility] OR       Social History     Tobacco Use    Smoking status: Never Smoker    Smokeless tobacco: Never Used   Substance Use Topics    Alcohol use: No    Drug use: No       Family History   Problem Relation Age of Onset    Glaucoma Mother     Cataracts Mother     Cataracts Father     Glaucoma Sister     Cataracts Sister     Glaucoma Maternal Aunt     Prostate cancer Neg Hx          Review of Systems   Constitution: Negative for decreased appetite, diaphoresis, fever, weakness, malaise/fatigue and night sweats.   HENT: Negative for nosebleeds.    Eyes: Negative for blurred vision and double vision.   Cardiovascular: Positive for dyspnea on exertion, orthopnea and paroxysmal nocturnal dyspnea. Negative for chest pain, claudication, irregular heartbeat, leg swelling, near-syncope, palpitations and syncope.   Respiratory: Negative for cough, shortness of breath, sleep disturbances due to breathing, snoring, sputum production and wheezing.    Endocrine: Negative for cold intolerance and polyuria.   Hematologic/Lymphatic: Does not bruise/bleed easily.   Skin: Negative for rash.   Musculoskeletal: Negative for back pain, falls, joint pain, joint swelling and neck pain.   Gastrointestinal: Positive for nausea. Negative for abdominal pain, heartburn and vomiting.   Genitourinary: Negative for dysuria, frequency and hematuria.   Neurological: Positive for headaches. Negative for difficulty with concentration, dizziness, focal weakness, light-headedness, numbness and seizures.   Psychiatric/Behavioral: Negative for depression, memory loss and substance abuse. The patient does not have insomnia.    Allergic/Immunologic: Negative for HIV exposure and hives.       Objective:   Physical Exam    Constitutional: He is oriented to person, place, and time. He appears well-nourished.   HENT:   Head: Normocephalic.   Eyes: Pupils are equal, round, and reactive to light.   Neck: Normal carotid pulses and no JVD present. Carotid bruit is not present. No thyromegaly present.   Cardiovascular: Normal rate, regular rhythm, normal heart sounds and normal pulses.  No extrasystoles are present. PMI is not displaced. Exam reveals no gallop and no S3.   No murmur heard.  Pulmonary/Chest: Breath sounds normal. No stridor. No respiratory distress.   Crackles +     Abdominal: Soft. Bowel sounds are normal. There is no tenderness. There is no rebound.   Musculoskeletal: Normal range of motion.   1+ edema   Neurological: He is alert and oriented to person, place, and time.   Skin: Skin is intact. No rash noted.   Psychiatric: His behavior is normal.       Lab Results   Component Value Date    CHOL 201 (H) 09/13/2018    CHOL 171 07/07/2017    CHOL 189 11/16/2016     Lab Results   Component Value Date    HDL 49 09/13/2018    HDL 42 07/07/2017    HDL 42 11/16/2016     Lab Results   Component Value Date    LDLCALC 132.8 09/13/2018    LDLCALC 112.4 07/07/2017    LDLCALC 120.2 11/16/2016     Lab Results   Component Value Date    TRIG 96 09/13/2018    TRIG 83 07/07/2017    TRIG 134 11/16/2016     Lab Results   Component Value Date    CHOLHDL 24.4 09/13/2018    CHOLHDL 24.6 07/07/2017    CHOLHDL 22.2 11/16/2016       Chemistry        Component Value Date/Time     09/13/2018 1202    K 3.5 09/13/2018 1202     09/13/2018 1202    CO2 27 09/13/2018 1202    BUN 12 09/13/2018 1202    CREATININE 0.8 09/13/2018 1202     (H) 09/13/2018 1202        Component Value Date/Time    CALCIUM 9.3 09/13/2018 1202    ALKPHOS 65 09/13/2018 1202    AST 45 (H) 09/13/2018 1202    ALT 41 09/13/2018 1202    BILITOT 0.5 09/13/2018 1202    ESTGFRAFRICA >60.0 09/13/2018 1202    EGFRNONAA >60.0 09/13/2018 1202          Lab Results   Component  Value Date    HGBA1C 8.0 (H) 09/13/2018     Lab Results   Component Value Date    TSH 2.374 07/20/2017     Lab Results   Component Value Date    INR 1.0 08/06/2018    INR 1.0 03/15/2017    INR 1.0 11/16/2016     Lab Results   Component Value Date    WBC 8.38 09/13/2018    HGB 11.9 (L) 09/13/2018    HCT 37.4 (L) 09/13/2018    MCV 85 09/13/2018     09/13/2018     BMP  Sodium   Date Value Ref Range Status   09/13/2018 140 136 - 145 mmol/L Final     Potassium   Date Value Ref Range Status   09/13/2018 3.5 3.5 - 5.1 mmol/L Final     Chloride   Date Value Ref Range Status   09/13/2018 104 95 - 110 mmol/L Final     CO2   Date Value Ref Range Status   09/13/2018 27 23 - 29 mmol/L Final     BUN, Bld   Date Value Ref Range Status   09/13/2018 12 8 - 23 mg/dL Final     Creatinine   Date Value Ref Range Status   09/13/2018 0.8 0.5 - 1.4 mg/dL Final     Calcium   Date Value Ref Range Status   09/13/2018 9.3 8.7 - 10.5 mg/dL Final     Anion Gap   Date Value Ref Range Status   09/13/2018 9 8 - 16 mmol/L Final     eGFR if    Date Value Ref Range Status   09/13/2018 >60.0 >60 mL/min/1.73 m^2 Final     eGFR if non    Date Value Ref Range Status   09/13/2018 >60.0 >60 mL/min/1.73 m^2 Final     Comment:     Calculation used to obtain the estimated glomerular filtration  rate (eGFR) is the CKD-EPI equation.        BNP  @LABRCNTIP(BNP,BNPTRIAGEBLO)@  @LABRCNTIP(troponini)@  Estimated Creatinine Clearance: 101.4 mL/min (based on SCr of 0.8 mg/dL).  No results found in the last 24 hours.  No results found in the last 24 hours.  No results found in the last 24 hours.    Assessment:      1. Chronic systolic congestive heart failure    2. Coronary artery disease involving native coronary artery of native heart without angina pectoris    3. Essential hypertension    4. PAF (paroxysmal atrial fibrillation)    5. Diabetic polyneuropathy associated with type 2 diabetes mellitus      No CP  CHFrEF 35%,  compensated, ELIZABETH class III  No AFIB  BP wnl  LDL high  Visiting nurse filled the medicine box once in 2 weeks  Plan:   BNP abd BMP in 2 weeks  Add Repatha  Continue Lasix 80 mg in AM and 40 mg in PM  Continue Metolazone, ToprolXL, ACEi, Imdur, aldactone and Lipitor 40 mg,   Refer to neurology for hand shaking   DASH and fludi restriction to 1.5 liters  RTC in 3 months

## 2018-09-18 ENCOUNTER — HOSPITAL ENCOUNTER (EMERGENCY)
Facility: HOSPITAL | Age: 63
Discharge: HOME | End: 2018-09-19
Attending: EMERGENCY MEDICINE
Payer: MEDICARE

## 2018-09-18 DIAGNOSIS — R42 DIZZINESS: ICD-10-CM

## 2018-09-18 DIAGNOSIS — R53.1 WEAKNESS: ICD-10-CM

## 2018-09-18 DIAGNOSIS — R25.1 TREMORS OF NERVOUS SYSTEM: Primary | ICD-10-CM

## 2018-09-18 DIAGNOSIS — R07.9 CHEST PAIN, UNSPECIFIED TYPE: ICD-10-CM

## 2018-09-18 LAB
ALBUMIN SERPL BCP-MCNC: 3.2 G/DL
ALP SERPL-CCNC: 63 U/L
ALT SERPL W/O P-5'-P-CCNC: 22 U/L
ANION GAP SERPL CALC-SCNC: 11 MMOL/L
AST SERPL-CCNC: 21 U/L
BASOPHILS # BLD AUTO: 0.01 K/UL
BASOPHILS NFR BLD: 0.1 %
BILIRUB SERPL-MCNC: 0.3 MG/DL
BILIRUB UR QL STRIP: NEGATIVE
BNP SERPL-MCNC: 161 PG/ML
BUN SERPL-MCNC: 30 MG/DL
CALCIUM SERPL-MCNC: 9.6 MG/DL
CHLORIDE SERPL-SCNC: 95 MMOL/L
CK MB SERPL-MCNC: 4.3 NG/ML
CK MB SERPL-RTO: 1.8 %
CK SERPL-CCNC: 245 U/L
CK SERPL-CCNC: 245 U/L
CLARITY UR: CLEAR
CO2 SERPL-SCNC: 31 MMOL/L
COLOR UR: YELLOW
CREAT SERPL-MCNC: 1.7 MG/DL
DIFFERENTIAL METHOD: ABNORMAL
EOSINOPHIL # BLD AUTO: 0.2 K/UL
EOSINOPHIL NFR BLD: 1.4 %
ERYTHROCYTE [DISTWIDTH] IN BLOOD BY AUTOMATED COUNT: 15.3 %
EST. GFR  (AFRICAN AMERICAN): 49 ML/MIN/1.73 M^2
EST. GFR  (NON AFRICAN AMERICAN): 43 ML/MIN/1.73 M^2
ETHANOL SERPL-MCNC: <10 MG/DL
GLUCOSE SERPL-MCNC: 141 MG/DL
GLUCOSE UR QL STRIP: NEGATIVE
HCT VFR BLD AUTO: 40 %
HGB BLD-MCNC: 13 G/DL
HGB UR QL STRIP: ABNORMAL
KETONES UR QL STRIP: NEGATIVE
LEUKOCYTE ESTERASE UR QL STRIP: NEGATIVE
LYMPHOCYTES # BLD AUTO: 2.7 K/UL
LYMPHOCYTES NFR BLD: 24.7 %
MAGNESIUM SERPL-MCNC: 1.7 MG/DL
MCH RBC QN AUTO: 27.7 PG
MCHC RBC AUTO-ENTMCNC: 32.5 G/DL
MCV RBC AUTO: 85 FL
MICROSCOPIC COMMENT: ABNORMAL
MONOCYTES # BLD AUTO: 0.9 K/UL
MONOCYTES NFR BLD: 7.9 %
NEUTROPHILS # BLD AUTO: 7.1 K/UL
NEUTROPHILS NFR BLD: 65.9 %
NITRITE UR QL STRIP: NEGATIVE
PH UR STRIP: 8 [PH] (ref 5–8)
PLATELET # BLD AUTO: 186 K/UL
PMV BLD AUTO: 10.3 FL
POTASSIUM SERPL-SCNC: 4.3 MMOL/L
PROT SERPL-MCNC: 7.4 G/DL
PROT UR QL STRIP: NEGATIVE
RBC # BLD AUTO: 4.7 M/UL
RBC #/AREA URNS HPF: 5 /HPF (ref 0–4)
SODIUM SERPL-SCNC: 137 MMOL/L
SP GR UR STRIP: 1.01 (ref 1–1.03)
TROPONIN I SERPL DL<=0.01 NG/ML-MCNC: 0.28 NG/ML
TSH SERPL DL<=0.005 MIU/L-ACNC: 0.85 UIU/ML
URN SPEC COLLECT METH UR: ABNORMAL
UROBILINOGEN UR STRIP-ACNC: NEGATIVE EU/DL
WBC # BLD AUTO: 10.83 K/UL

## 2018-09-18 PROCEDURE — 96361 HYDRATE IV INFUSION ADD-ON: CPT

## 2018-09-18 PROCEDURE — 83735 ASSAY OF MAGNESIUM: CPT

## 2018-09-18 PROCEDURE — 84443 ASSAY THYROID STIM HORMONE: CPT

## 2018-09-18 PROCEDURE — 96360 HYDRATION IV INFUSION INIT: CPT

## 2018-09-18 PROCEDURE — 25000003 PHARM REV CODE 250: Performed by: EMERGENCY MEDICINE

## 2018-09-18 PROCEDURE — 93005 ELECTROCARDIOGRAM TRACING: CPT

## 2018-09-18 PROCEDURE — 93010 ELECTROCARDIOGRAM REPORT: CPT | Mod: ,,, | Performed by: INTERNAL MEDICINE

## 2018-09-18 PROCEDURE — 80053 COMPREHEN METABOLIC PANEL: CPT

## 2018-09-18 PROCEDURE — 85025 COMPLETE CBC W/AUTO DIFF WBC: CPT

## 2018-09-18 PROCEDURE — 82553 CREATINE MB FRACTION: CPT

## 2018-09-18 PROCEDURE — 36415 COLL VENOUS BLD VENIPUNCTURE: CPT

## 2018-09-18 PROCEDURE — 81000 URINALYSIS NONAUTO W/SCOPE: CPT | Mod: 59

## 2018-09-18 PROCEDURE — 84484 ASSAY OF TROPONIN QUANT: CPT

## 2018-09-18 PROCEDURE — 82550 ASSAY OF CK (CPK): CPT

## 2018-09-18 PROCEDURE — 83880 ASSAY OF NATRIURETIC PEPTIDE: CPT

## 2018-09-18 PROCEDURE — 99285 EMERGENCY DEPT VISIT HI MDM: CPT | Mod: 25

## 2018-09-18 PROCEDURE — 80320 DRUG SCREEN QUANTALCOHOLS: CPT

## 2018-09-18 RX ADMIN — SODIUM CHLORIDE 1000 ML: 0.9 INJECTION, SOLUTION INTRAVENOUS at 09:09

## 2018-09-19 ENCOUNTER — TELEPHONE (OUTPATIENT)
Dept: INTERNAL MEDICINE | Facility: CLINIC | Age: 63
End: 2018-09-19

## 2018-09-19 VITALS
SYSTOLIC BLOOD PRESSURE: 127 MMHG | DIASTOLIC BLOOD PRESSURE: 69 MMHG | TEMPERATURE: 98 F | WEIGHT: 209 LBS | OXYGEN SATURATION: 95 % | BODY MASS INDEX: 34.82 KG/M2 | HEIGHT: 65 IN | HEART RATE: 86 BPM | RESPIRATION RATE: 16 BRPM

## 2018-09-19 LAB
AMPHET+METHAMPHET UR QL: NEGATIVE
BARBITURATES UR QL SCN>200 NG/ML: NEGATIVE
BENZODIAZ UR QL SCN>200 NG/ML: NEGATIVE
BZE UR QL SCN: NEGATIVE
CANNABINOIDS UR QL SCN: NEGATIVE
CREAT UR-MCNC: 51.5 MG/DL
METHADONE UR QL SCN>300 NG/ML: NEGATIVE
OPIATES UR QL SCN: NEGATIVE
PCP UR QL SCN>25 NG/ML: NEGATIVE
TOXICOLOGY INFORMATION: NORMAL

## 2018-09-19 PROCEDURE — 80307 DRUG TEST PRSMV CHEM ANLYZR: CPT

## 2018-09-19 NOTE — TELEPHONE ENCOUNTER
Pt states that he went to the ED yesterday and they can't find anything wrong. States he is still having tremors and can't hold anything still in his hands. Per Dr. Lambert, he wants him to schedule a fu for next week. appt scheduled

## 2018-09-19 NOTE — ED NOTES
Pt. Able to hold cup without hand tremors, swallow and keep fluids down. Dr. Kay notified of successful PO challenge.

## 2018-09-19 NOTE — TELEPHONE ENCOUNTER
Documentation Only:    Faxed Prior Authorization form and supporting documentation for Repatha to insurance on 09/19/2018.

## 2018-09-20 NOTE — TELEPHONE ENCOUNTER
Documentation Only:    Prior Authorization for Repatha has been approved for 6 months.    Approval dates:  09/20/2018 through 03/20/2019    Patient co-pay:  $8.35

## 2018-09-20 NOTE — TELEPHONE ENCOUNTER
"Repatha initial consultation, injection training, and shipment attempted.  Both home and mobile numbers listed in EPIC is "not reachable" at the moment.  MyChart unavailable.  $8.35 copay in 004.  "

## 2018-09-25 ENCOUNTER — OFFICE VISIT (OUTPATIENT)
Dept: INTERNAL MEDICINE | Facility: CLINIC | Age: 63
End: 2018-09-25
Payer: MEDICARE

## 2018-09-25 VITALS
WEIGHT: 194.44 LBS | DIASTOLIC BLOOD PRESSURE: 84 MMHG | RESPIRATION RATE: 18 BRPM | OXYGEN SATURATION: 98 % | BODY MASS INDEX: 32.4 KG/M2 | SYSTOLIC BLOOD PRESSURE: 132 MMHG | HEART RATE: 94 BPM | TEMPERATURE: 97 F | HEIGHT: 65 IN

## 2018-09-25 DIAGNOSIS — E11.42 DIABETIC POLYNEUROPATHY ASSOCIATED WITH TYPE 2 DIABETES MELLITUS: ICD-10-CM

## 2018-09-25 DIAGNOSIS — I10 ESSENTIAL (PRIMARY) HYPERTENSION: Primary | ICD-10-CM

## 2018-09-25 DIAGNOSIS — Z91.89 AT RISK FOR MEDICATION NONCOMPLIANCE: ICD-10-CM

## 2018-09-25 PROBLEM — G47.21 DELAYED SLEEP PHASE SYNDROME: Status: RESOLVED | Noted: 2017-01-12 | Resolved: 2018-09-25

## 2018-09-25 PROBLEM — R70.0 ELEVATED ERYTHROCYTE SEDIMENTATION RATE: Chronic | Status: RESOLVED | Noted: 2017-08-29 | Resolved: 2018-09-25

## 2018-09-25 PROCEDURE — 99999 PR PBB SHADOW E&M-EST. PATIENT-LVL III: CPT | Mod: PBBFAC,,, | Performed by: FAMILY MEDICINE

## 2018-09-25 PROCEDURE — 3079F DIAST BP 80-89 MM HG: CPT | Mod: CPTII,,, | Performed by: FAMILY MEDICINE

## 2018-09-25 PROCEDURE — 3075F SYST BP GE 130 - 139MM HG: CPT | Mod: CPTII,,, | Performed by: FAMILY MEDICINE

## 2018-09-25 PROCEDURE — 3008F BODY MASS INDEX DOCD: CPT | Mod: CPTII,,, | Performed by: FAMILY MEDICINE

## 2018-09-25 PROCEDURE — 99214 OFFICE O/P EST MOD 30 MIN: CPT | Mod: S$PBB,,, | Performed by: FAMILY MEDICINE

## 2018-09-25 PROCEDURE — 3045F PR MOST RECENT HEMOGLOBIN A1C LEVEL 7.0-9.0%: CPT | Mod: CPTII,,, | Performed by: FAMILY MEDICINE

## 2018-09-25 PROCEDURE — 99213 OFFICE O/P EST LOW 20 MIN: CPT | Mod: PBBFAC,PO | Performed by: FAMILY MEDICINE

## 2018-09-25 RX ORDER — BACLOFEN 10 MG/1
10 TABLET ORAL DAILY
Qty: 90 TABLET | Refills: 3 | Status: SHIPPED | OUTPATIENT
Start: 2018-09-25 | End: 2020-03-23 | Stop reason: SDUPTHER

## 2018-09-25 NOTE — TELEPHONE ENCOUNTER
Repatha initial consultation, injection training, and shipment attempted.   Provider staff messaged for alternative phone numbers. $8.35 copay in 004.

## 2018-09-25 NOTE — PROGRESS NOTES
Subjective:       Patient ID: Crow Green is a 63 y.o. male.    Chief Complaint: Follow-up (ED)    HPI    Here today to follow-up on recent emergency room visit.  No significant findings were evident.  He still has intermittent tingling of his feet and hands but recognizes this is likely secondary from diabetes.  He is doing better.  No chest pain and no shortness of breath.  Has been trying to get out and walk more even though he has some pain.  Is scheduled for nerve burning procedure next week.    Family History   Problem Relation Age of Onset    Glaucoma Mother     Cataracts Mother     Cataracts Father     Glaucoma Sister     Cataracts Sister     Glaucoma Maternal Aunt     No Known Problems Brother     No Known Problems Daughter     No Known Problems Son     Prostate cancer Neg Hx        Current Outpatient Medications:     ALCOHOL ANTISEPTIC PADS (ALCOHOL PREP PADS TOP), , Disp: , Rfl:     amLODIPine (NORVASC) 10 MG tablet, TAKE 1 TABLET ONE TIME DAILY, Disp: 90 tablet, Rfl: 3    ammonium lactate 12 % Crea, Apply 1 Act topically 2 (two) times daily. (Patient taking differently: Apply 1 Act topically once daily. ), Disp: 1 Tube, Rfl: 3    apixaban 5 mg Tab, Take 1 tablet (5 mg total) by mouth 2 (two) times daily., Disp: 60 tablet, Rfl: 1    aspirin (ECOTRIN) 81 MG EC tablet, Take 1 tablet (81 mg total) by mouth once daily., Disp: , Rfl: 0    atorvastatin (LIPITOR) 40 MG tablet, TAKE 1 TABLET EVERY EVENING, Disp: 90 tablet, Rfl: 3    azithromycin (Z-HOLDEN) 250 MG tablet, Take 2 tablets by mouth on day 1; Take 1 tablet by mouth on days 2-5, Disp: 6 tablet, Rfl: 0    baclofen (LIORESAL) 10 MG tablet, Take 10 mg by mouth once daily., Disp: , Rfl:     evolocumab (REPATHA SURECLICK) 140 mg/mL PnIj, Inject 140 mg into the skin every 14 (fourteen) days., Disp: 11 mL, Rfl: 3    exenatide microspheres (BYDUREON BCISE) 2 mg/0.85 mL AtIn, Inject 2 mg into the skin every 7 days., Disp: 4 Syringe,  Rfl: 11    fluticasone-salmeterol 250-50 mcg/dose (ADVAIR DISKUS) 250-50 mcg/dose diskus inhaler, Inhale 1 puff into the lungs 2 (two) times daily. Controller, Disp: 60 each, Rfl: 11    furosemide (LASIX) 40 MG tablet, Take 2 tablets (80 mg total) by mouth every morning AND 1 tablet (40 mg total) every evening., Disp: 90 tablet, Rfl: 11    gabapentin (NEURONTIN) 600 MG tablet, Take 1 tablet (600 mg total) by mouth 3 (three) times daily., Disp: 90 tablet, Rfl: 1    glimepiride (AMARYL) 2 MG tablet, Take 1 tablet (2 mg total) by mouth before breakfast., Disp: 90 tablet, Rfl: 3    HYDROcodone-acetaminophen (NORCO)  mg per tablet, HYDROCODONE-ACETAMINOPHEN (Norco)  MG ORAL TAB - 1 tab po q 6 hrs prn pain, Disp: 35 tablet, Rfl: 0    insulin glargine (LANTUS SOLOSTAR) 100 unit/mL (3 mL) InPn pen, Inject 50 Units into the skin every evening. (Patient taking differently: Inject 70 Units into the skin every evening. ), Disp: 2 Box, Rfl: 11    insulin syringe-needle U-100 1 mL 31 gauge x 5/16 Syrg, , Disp: , Rfl:     isosorbide mononitrate (IMDUR) 60 MG 24 hr tablet, Take 1 tablet (60 mg total) by mouth once daily., Disp: 90 tablet, Rfl: 3    latanoprost 0.005 % ophthalmic solution, Place 1 drop into both eyes every evening., Disp: 1 Bottle, Rfl: 4    lisinopril 10 MG tablet, Take 1 tablet (10 mg total) by mouth once daily., Disp: 90 tablet, Rfl: 3    metFORMIN (GLUCOPHAGE) 1000 MG tablet, Take 1 tablet (1,000 mg total) by mouth 2 (two) times daily with meals., Disp: 180 tablet, Rfl: 3    metOLazone (ZAROXOLYN) 2.5 MG tablet, Take 1 tablet (2.5 mg total) by mouth once daily., Disp: 90 tablet, Rfl: 3    metoprolol succinate (TOPROL-XL) 50 MG 24 hr tablet, TAKE 1 TABLET EVERY DAY, Disp: 90 tablet, Rfl: 5    nitroGLYCERIN (NITROSTAT) 0.3 MG SL tablet, PLACE 1 TABLET UNDER THE TONGUE EVERY 5 MINUTES AS NEEDED FOR CHEST PAIN, NO MORE THAN 3 TABLETS IN ONE DAY, Disp: 100 tablet, Rfl: 0    NOVOLOG FLEXPEN  "100 unit/mL InPn pen, ADMINISTER 20 UNITS UNDER THE SKIN THREE TIMES DAILY WITH MEALS, Disp: 15 mL, Rfl: 0    pantoprazole (PROTONIX) 40 MG tablet, Take 1 tablet (40 mg total) by mouth once daily., Disp: 30 tablet, Rfl: 11    pen needle, diabetic (BD INSULIN PEN NEEDLE UF SHORT) 31 gauge x 5/16" Ndle, Inject 1 each into the skin 3 (three) times daily., Disp: 90 each, Rfl: 11    potassium chloride (K-TAB) 20 mEq, Take 1 tablet (20 mEq total) by mouth once daily. for 90 doses, Disp: 90 tablet, Rfl: 3    predniSONE (DELTASONE) 20 MG tablet, 3 daily x 3 days, 2 daily x 3 days, 1 daily x 3 days, 1/2 daily x 4 days., Disp: 20 tablet, Rfl: 0    sertraline (ZOLOFT) 50 MG tablet, TAKE 1 TABLET ONE TIME DAILY, Disp: 90 tablet, Rfl: 3    spironolactone (ALDACTONE) 25 MG tablet, Take 25 mg by mouth once daily., Disp: , Rfl:     tiotropium (SPIRIVA WITH HANDIHALER) 18 mcg inhalation capsule, Inhale 1 capsule (18 mcg total) into the lungs once daily. Controller, Disp: 90 capsule, Rfl: 3    traZODone (DESYREL) 150 MG tablet, Take 1 tablet (150 mg total) by mouth nightly., Disp: 90 tablet, Rfl: 1    TRUE METRIX AIR GLUCOSE METER kit, TEST FOUR TIMES DAILY BEFORE MEALS  AND EVERY NIGHT, Disp: 1 each, Rfl: 0    VENTOLIN HFA 90 mcg/actuation inhaler, Inhale 2 puffs into the lungs every 4 (four) hours as needed for Wheezing., Disp: 3 Inhaler, Rfl: 4    Review of Systems   Constitutional: Negative for chills and fever.   Eyes: Negative for visual disturbance.   Respiratory: Negative for cough and shortness of breath.    Cardiovascular: Negative for chest pain.   Gastrointestinal: Negative for abdominal pain.   Neurological: Negative for dizziness.       Objective:   /84 (BP Location: Right arm, Patient Position: Sitting, BP Method: Medium (Manual))   Pulse 94   Temp 96.6 °F (35.9 °C) (Tympanic)   Resp 18   Ht 5' 5" (1.651 m)   Wt 88.2 kg (194 lb 7.1 oz)   SpO2 98%   BMI 32.36 kg/m²      Physical Exam "   Constitutional: He is oriented to person, place, and time. He appears well-developed and well-nourished. No distress.   HENT:   Head: Normocephalic and atraumatic.   Nose: Nose normal.   Eyes: Conjunctivae and EOM are normal. Pupils are equal, round, and reactive to light. Right eye exhibits no discharge. Left eye exhibits no discharge.   Neck: No thyromegaly present.   Cardiovascular: Normal rate, regular rhythm and normal heart sounds.   No murmur heard.  Pulmonary/Chest: Effort normal and breath sounds normal. No respiratory distress. He has no wheezes. He has no rales.   Abdominal: Soft. He exhibits no distension.   Musculoskeletal: He exhibits no edema.   Neurological: He is alert and oriented to person, place, and time.   Skin: Skin is warm. No rash noted. He is not diaphoretic.   Psychiatric: He has a normal mood and affect. His behavior is normal.   Vitals reviewed.      Assessment & Plan     Problem List Items Addressed This Visit        Neuro    Diabetic polyneuropathy    Current Assessment & Plan       His diabetes is improving.  His most recent A1c was at 8. Encouraged him to keep up the good work in even though he has issues with chronic pain that he should try to walk on a daily basis and maybe even get on a stationary bike which will help with decreasing his pain and improve his overall health.            Cardiac/Vascular    Essential (primary) hypertension - Primary    Current Assessment & Plan       Blood pressure well controlled.  Will consider lowering medication at next visit if able to.            Other    At risk for medication noncompliance    Current Assessment & Plan       Spent time organizing this medication in medicine traced today.               Extensive amount of time spent by nursing staff organizing his medicine in pill trace for the next couple weeks.  Will have him follow up again in 2 weeks to do this.  Hopefully at that time we can start considering decreasing some of his  medications.    Follow-up in about 2 weeks (around 10/9/2018) for Blood pressure monitoring.

## 2018-09-25 NOTE — ASSESSMENT & PLAN NOTE
His diabetes is improving.  His most recent A1c was at 8. Encouraged him to keep up the good work in even though he has issues with chronic pain that he should try to walk on a daily basis and maybe even get on a stationary bike which will help with decreasing his pain and improve his overall health.

## 2018-09-26 ENCOUNTER — TELEPHONE (OUTPATIENT)
Dept: INTERNAL MEDICINE | Facility: CLINIC | Age: 63
End: 2018-09-26

## 2018-09-26 NOTE — TELEPHONE ENCOUNTER
Called number left in msg-not there.   Called home number listed in chart-disconnected  Called work number listed in chart-no answer.

## 2018-09-26 NOTE — TELEPHONE ENCOUNTER
----- Message from Nathen Mujica sent at 9/26/2018  4:45 PM CDT -----  Contact: pt   States he's calling rg having some questions about one of his meds and can be reached at [phone]//thanks/dbw

## 2018-10-01 NOTE — TELEPHONE ENCOUNTER
Patient stated that he has been on Repatha for 5 years and has 4 boxes at home?  He would OSP to call back after his next appointment on 10/19/18.

## 2018-10-02 ENCOUNTER — OFFICE VISIT (OUTPATIENT)
Dept: OPHTHALMOLOGY | Facility: CLINIC | Age: 63
End: 2018-10-02
Payer: MEDICARE

## 2018-10-02 DIAGNOSIS — H40.1134 PRIMARY OPEN ANGLE GLAUCOMA OF BOTH EYES, INDETERMINATE STAGE: Primary | ICD-10-CM

## 2018-10-02 PROCEDURE — 99212 OFFICE O/P EST SF 10 MIN: CPT | Mod: PBBFAC,PO | Performed by: OPTOMETRIST

## 2018-10-02 PROCEDURE — 99999 PR PBB SHADOW E&M-EST. PATIENT-LVL II: CPT | Mod: PBBFAC,,, | Performed by: OPTOMETRIST

## 2018-10-02 PROCEDURE — 92012 INTRM OPH EXAM EST PATIENT: CPT | Mod: S$PBB,,, | Performed by: OPTOMETRIST

## 2018-10-02 NOTE — PROGRESS NOTES
HPI     Last [facility] visit 06/26/2018  Glaucoma   3 month IOP Check  Medication: Latanoprost 0.005% 1 drop qhs OU  Last HVF/GOCT 03/05/2018    Diabetic/IDDM    Last edited by Kemal Bashir MA on 10/2/2018  9:46 AM. (History)            Assessment /Plan     For exam results, see Encounter Report.    Primary open angle glaucoma of both eyes, indeterminate stage      Good  IOP adjusted for CCT.  RTC 3 months for repeat HVF, GOCT, IOP, and MR (He wants new specs after the first of the year.

## 2018-10-05 ENCOUNTER — TELEPHONE (OUTPATIENT)
Dept: INTERNAL MEDICINE | Facility: CLINIC | Age: 63
End: 2018-10-05

## 2018-10-05 NOTE — TELEPHONE ENCOUNTER
----- Message from Lucille Campbell sent at 10/5/2018 10:26 AM CDT -----  Contact: Patient  Patient states he was not able to get the injection and is requesting pain medication be called in for him, please call him back at [phone]. Thank you

## 2018-10-09 ENCOUNTER — OFFICE VISIT (OUTPATIENT)
Dept: INTERNAL MEDICINE | Facility: CLINIC | Age: 63
End: 2018-10-09
Payer: MEDICARE

## 2018-10-09 VITALS
TEMPERATURE: 97 F | WEIGHT: 193.13 LBS | HEIGHT: 65 IN | OXYGEN SATURATION: 98 % | BODY MASS INDEX: 32.18 KG/M2 | RESPIRATION RATE: 18 BRPM | SYSTOLIC BLOOD PRESSURE: 128 MMHG | HEART RATE: 91 BPM | DIASTOLIC BLOOD PRESSURE: 80 MMHG

## 2018-10-09 DIAGNOSIS — M51.26 HNP (HERNIATED NUCLEUS PULPOSUS), LUMBAR: ICD-10-CM

## 2018-10-09 DIAGNOSIS — I50.22 CHRONIC SYSTOLIC CONGESTIVE HEART FAILURE: ICD-10-CM

## 2018-10-09 DIAGNOSIS — Z79.4 TYPE 2 DIABETES MELLITUS WITH MILD NONPROLIFERATIVE RETINOPATHY, WITH LONG-TERM CURRENT USE OF INSULIN, MACULAR EDEMA PRESENCE UNSPECIFIED, UNSPECIFIED LATERALITY: ICD-10-CM

## 2018-10-09 DIAGNOSIS — K21.9 GASTROESOPHAGEAL REFLUX DISEASE, ESOPHAGITIS PRESENCE NOT SPECIFIED: ICD-10-CM

## 2018-10-09 DIAGNOSIS — E11.3299 TYPE 2 DIABETES MELLITUS WITH MILD NONPROLIFERATIVE RETINOPATHY, WITH LONG-TERM CURRENT USE OF INSULIN, MACULAR EDEMA PRESENCE UNSPECIFIED, UNSPECIFIED LATERALITY: ICD-10-CM

## 2018-10-09 DIAGNOSIS — M47.26 OSTEOARTHRITIS OF SPINE WITH RADICULOPATHY, LUMBAR REGION: ICD-10-CM

## 2018-10-09 DIAGNOSIS — Z91.89 AT RISK FOR MEDICATION NONCOMPLIANCE: Primary | ICD-10-CM

## 2018-10-09 PROCEDURE — 3045F PR MOST RECENT HEMOGLOBIN A1C LEVEL 7.0-9.0%: CPT | Mod: CPTII,,, | Performed by: FAMILY MEDICINE

## 2018-10-09 PROCEDURE — 3079F DIAST BP 80-89 MM HG: CPT | Mod: CPTII,,, | Performed by: FAMILY MEDICINE

## 2018-10-09 PROCEDURE — 3008F BODY MASS INDEX DOCD: CPT | Mod: CPTII,,, | Performed by: FAMILY MEDICINE

## 2018-10-09 PROCEDURE — 99999 PR PBB SHADOW E&M-EST. PATIENT-LVL III: CPT | Mod: PBBFAC,,, | Performed by: FAMILY MEDICINE

## 2018-10-09 PROCEDURE — 99213 OFFICE O/P EST LOW 20 MIN: CPT | Mod: PBBFAC,PO | Performed by: FAMILY MEDICINE

## 2018-10-09 PROCEDURE — 99214 OFFICE O/P EST MOD 30 MIN: CPT | Mod: S$PBB,,, | Performed by: FAMILY MEDICINE

## 2018-10-09 PROCEDURE — 3074F SYST BP LT 130 MM HG: CPT | Mod: CPTII,,, | Performed by: FAMILY MEDICINE

## 2018-10-09 RX ORDER — POTASSIUM CHLORIDE 1500 MG/1
20 TABLET, EXTENDED RELEASE ORAL DAILY
Qty: 90 TABLET | Refills: 3 | Status: SHIPPED | OUTPATIENT
Start: 2018-10-09 | End: 2019-01-07

## 2018-10-09 RX ORDER — GLIMEPIRIDE 2 MG/1
2 TABLET ORAL
Qty: 90 TABLET | Refills: 3 | Status: SHIPPED | OUTPATIENT
Start: 2018-10-09 | End: 2020-01-16

## 2018-10-09 RX ORDER — METFORMIN HYDROCHLORIDE 1000 MG/1
1000 TABLET ORAL 2 TIMES DAILY WITH MEALS
Qty: 180 TABLET | Refills: 3 | Status: SHIPPED | OUTPATIENT
Start: 2018-10-09 | End: 2019-11-25 | Stop reason: SDUPTHER

## 2018-10-09 RX ORDER — GABAPENTIN 600 MG/1
600 TABLET ORAL 3 TIMES DAILY
Qty: 90 TABLET | Refills: 1 | Status: SHIPPED | OUTPATIENT
Start: 2018-10-09 | End: 2018-12-10 | Stop reason: SDUPTHER

## 2018-10-09 RX ORDER — FUROSEMIDE 40 MG/1
TABLET ORAL
Qty: 90 TABLET | Refills: 11 | Status: SHIPPED | OUTPATIENT
Start: 2018-10-09 | End: 2019-07-29 | Stop reason: SDUPTHER

## 2018-10-09 RX ORDER — HYDROCODONE BITARTRATE AND ACETAMINOPHEN 10; 325 MG/1; MG/1
TABLET ORAL
Qty: 28 TABLET | Refills: 0 | Status: SHIPPED | OUTPATIENT
Start: 2018-10-09 | End: 2018-10-29 | Stop reason: SDUPTHER

## 2018-10-09 RX ORDER — PANTOPRAZOLE SODIUM 40 MG/1
40 TABLET, DELAYED RELEASE ORAL DAILY
Qty: 30 TABLET | Refills: 11 | Status: SHIPPED | OUTPATIENT
Start: 2018-10-09 | End: 2020-04-09 | Stop reason: SDUPTHER

## 2018-10-11 NOTE — ASSESSMENT & PLAN NOTE
He is certainly making progress.  His most recent hemoglobin A1c was 8.0.  His report of glucose readings mostly being below 150 and even closer to the 120s many times suggest that his A1c will be even better and at the next check.  I encouraged him to keep up the good work

## 2018-10-11 NOTE — ASSESSMENT & PLAN NOTE
Over the last month he has lost about 20 lb which likely is secondary to optimal diuresis as well as dietary adherence.

## 2018-10-11 NOTE — ASSESSMENT & PLAN NOTE
Medical assistant Gemma spent more than 20 min organizing all of his medications for the next 2 weeks.  We were able to eliminate couple of medicines that are no longer indicated and identify where he needed refills.

## 2018-10-18 ENCOUNTER — TELEPHONE (OUTPATIENT)
Dept: INTERNAL MEDICINE | Facility: CLINIC | Age: 63
End: 2018-10-18

## 2018-10-18 NOTE — TELEPHONE ENCOUNTER
----- Message from Fouzia Timmons sent at 10/18/2018  3:34 PM CDT -----  Contact: self/[phone]  Would like to consult with nurse regarding a referral for pain management, please call back at [phone]. Thanks/ar

## 2018-10-18 NOTE — TELEPHONE ENCOUNTER
Contacted pt and pt stated he will just wait until Tuesday for his scheduled appointment with Dr. Lambert.

## 2018-10-22 ENCOUNTER — CLINICAL SUPPORT (OUTPATIENT)
Dept: PULMONOLOGY | Facility: CLINIC | Age: 63
End: 2018-10-22
Payer: MEDICARE

## 2018-10-22 DIAGNOSIS — J45.40 MODERATE PERSISTENT ASTHMA WITHOUT COMPLICATION: ICD-10-CM

## 2018-10-22 LAB
BRPFT: ABNORMAL
FEF 25 75 CHG: 61.4 %
FEF 25 75 LLN: 0.92
FEF 25 75 POST REF: 50.2 %
FEF 25 75 PRE REF: 31.1 %
FEF 25 75 REF: 2.24
FET100 CHG: -7.7 %
FEV1 CHG: -0.1 %
FEV1 FVC CHG: 8.4 %
FEV1 FVC LLN: 67
FEV1 FVC POST REF: 91.5 %
FEV1 FVC PRE REF: 84.4 %
FEV1 FVC REF: 79
FEV1 LLN: 1.84
FEV1 POST REF: 73.4 %
FEV1 PRE REF: 73.5 %
FEV1 REF: 2.56
FEV6 CHG: -3.9 %
FEV6 LLN: 2.34
FEV6 POST REF: 74.8 %
FEV6 POST: 2.34 L (ref 2.34–3.93)
FEV6 PRE REF: 77.8 %
FEV6 PRE: 2.44 L (ref 2.34–3.93)
FEV6 REF: 3.13
FVC CHG: -7.8 %
FVC LLN: 2.4
FVC POST REF: 80.4 %
FVC PRE REF: 87.2 %
FVC REF: 3.24
MVV LLN: 97
MVV REF: 114
PEF CHG: -5.2 %
PEF LLN: 4.9
PEF POST REF: 75.7 %
PEF PRE REF: 79.9 %
PEF REF: 7.19
POST FEF 25 75: 1.12 L/S (ref 0.92–4.13)
POST FET 100: 12.07 SEC
POST FEV1 FVC: 72.11 % (ref 66.9–89.26)
POST FEV1: 1.88 L (ref 1.84–3.23)
POST FVC: 2.61 L (ref 2.4–4.1)
POST PEF: 5.44 L/S (ref 4.9–9.47)
PRE FEF 25 75: 0.7 L/S (ref 0.92–4.13)
PRE FET 100: 13.07 SEC
PRE FEV1 FVC: 66.52 % (ref 66.9–89.26)
PRE FEV1: 1.88 L (ref 1.84–3.23)
PRE FVC: 2.83 L (ref 2.4–4.1)
PRE PEF: 5.74 L/S (ref 4.9–9.47)

## 2018-10-22 PROCEDURE — 94060 EVALUATION OF WHEEZING: CPT | Mod: 26,S$PBB,, | Performed by: INTERNAL MEDICINE

## 2018-10-22 PROCEDURE — 94060 EVALUATION OF WHEEZING: CPT | Mod: PBBFAC,PO

## 2018-10-23 ENCOUNTER — OFFICE VISIT (OUTPATIENT)
Dept: INTERNAL MEDICINE | Facility: CLINIC | Age: 63
End: 2018-10-23
Payer: MEDICARE

## 2018-10-23 ENCOUNTER — HOSPITAL ENCOUNTER (EMERGENCY)
Facility: HOSPITAL | Age: 63
Discharge: HOME | End: 2018-10-23
Attending: EMERGENCY MEDICINE
Payer: MEDICARE

## 2018-10-23 VITALS
SYSTOLIC BLOOD PRESSURE: 133 MMHG | HEIGHT: 67 IN | BODY MASS INDEX: 30.76 KG/M2 | WEIGHT: 196 LBS | RESPIRATION RATE: 23 BRPM | TEMPERATURE: 98 F | DIASTOLIC BLOOD PRESSURE: 83 MMHG | OXYGEN SATURATION: 100 % | HEART RATE: 77 BPM

## 2018-10-23 VITALS
TEMPERATURE: 97 F | WEIGHT: 197.06 LBS | HEIGHT: 65 IN | SYSTOLIC BLOOD PRESSURE: 112 MMHG | HEART RATE: 74 BPM | OXYGEN SATURATION: 98 % | BODY MASS INDEX: 32.83 KG/M2 | RESPIRATION RATE: 18 BRPM | DIASTOLIC BLOOD PRESSURE: 62 MMHG

## 2018-10-23 DIAGNOSIS — I50.22 CHRONIC SYSTOLIC CONGESTIVE HEART FAILURE: ICD-10-CM

## 2018-10-23 DIAGNOSIS — Z91.89 AT RISK FOR MEDICATION NONCOMPLIANCE: ICD-10-CM

## 2018-10-23 DIAGNOSIS — R11.10 VOMITING, INTRACTABILITY OF VOMITING NOT SPECIFIED, PRESENCE OF NAUSEA NOT SPECIFIED, UNSPECIFIED VOMITING TYPE: ICD-10-CM

## 2018-10-23 DIAGNOSIS — R06.02 SOB (SHORTNESS OF BREATH): ICD-10-CM

## 2018-10-23 DIAGNOSIS — N28.9 ACUTE RENAL INSUFFICIENCY: ICD-10-CM

## 2018-10-23 DIAGNOSIS — E86.9 VOLUME DEPLETION: Primary | ICD-10-CM

## 2018-10-23 DIAGNOSIS — R11.0 NAUSEA: Primary | ICD-10-CM

## 2018-10-23 LAB
BASOPHILS # BLD AUTO: 0.01 K/UL
BASOPHILS NFR BLD: 0.1 %
BNP SERPL-MCNC: 101 PG/ML
CK MB SERPL-MCNC: 4.6 NG/ML
CK MB SERPL-RTO: 3.1 %
CK SERPL-CCNC: 147 U/L
CK SERPL-CCNC: 147 U/L
DIFFERENTIAL METHOD: ABNORMAL
EOSINOPHIL # BLD AUTO: 0.2 K/UL
EOSINOPHIL NFR BLD: 1.4 %
ERYTHROCYTE [DISTWIDTH] IN BLOOD BY AUTOMATED COUNT: 14 %
HCT VFR BLD AUTO: 41.5 %
HGB BLD-MCNC: 13.5 G/DL
LYMPHOCYTES # BLD AUTO: 2.6 K/UL
LYMPHOCYTES NFR BLD: 23.4 %
MAGNESIUM SERPL-MCNC: 1.9 MG/DL
MCH RBC QN AUTO: 27 PG
MCHC RBC AUTO-ENTMCNC: 32.5 G/DL
MCV RBC AUTO: 83 FL
MONOCYTES # BLD AUTO: 0.8 K/UL
MONOCYTES NFR BLD: 7.6 %
NEUTROPHILS # BLD AUTO: 7.5 K/UL
NEUTROPHILS NFR BLD: 67.3 %
PHOSPHATE SERPL-MCNC: 3.5 MG/DL
PLATELET # BLD AUTO: 178 K/UL
PMV BLD AUTO: 11.8 FL
RBC # BLD AUTO: 5 M/UL
TROPONIN I SERPL DL<=0.01 NG/ML-MCNC: 0.21 NG/ML
WBC # BLD AUTO: 11.12 K/UL

## 2018-10-23 PROCEDURE — 99285 EMERGENCY DEPT VISIT HI MDM: CPT | Mod: 25,27

## 2018-10-23 PROCEDURE — 99999 PR PBB SHADOW E&M-EST. PATIENT-LVL V: CPT | Mod: PBBFAC,,, | Performed by: FAMILY MEDICINE

## 2018-10-23 PROCEDURE — 99214 OFFICE O/P EST MOD 30 MIN: CPT | Mod: S$PBB,,, | Performed by: FAMILY MEDICINE

## 2018-10-23 PROCEDURE — 83735 ASSAY OF MAGNESIUM: CPT

## 2018-10-23 PROCEDURE — 99900035 HC TECH TIME PER 15 MIN (STAT)

## 2018-10-23 PROCEDURE — 99215 OFFICE O/P EST HI 40 MIN: CPT | Mod: PBBFAC,25,PO | Performed by: FAMILY MEDICINE

## 2018-10-23 PROCEDURE — 3078F DIAST BP <80 MM HG: CPT | Mod: CPTII,,, | Performed by: FAMILY MEDICINE

## 2018-10-23 PROCEDURE — 82553 CREATINE MB FRACTION: CPT

## 2018-10-23 PROCEDURE — 96360 HYDRATION IV INFUSION INIT: CPT

## 2018-10-23 PROCEDURE — 3008F BODY MASS INDEX DOCD: CPT | Mod: CPTII,,, | Performed by: FAMILY MEDICINE

## 2018-10-23 PROCEDURE — 93005 ELECTROCARDIOGRAM TRACING: CPT

## 2018-10-23 PROCEDURE — 84484 ASSAY OF TROPONIN QUANT: CPT

## 2018-10-23 PROCEDURE — 3045F PR MOST RECENT HEMOGLOBIN A1C LEVEL 7.0-9.0%: CPT | Mod: CPTII,,, | Performed by: FAMILY MEDICINE

## 2018-10-23 PROCEDURE — 82550 ASSAY OF CK (CPK): CPT

## 2018-10-23 PROCEDURE — 85025 COMPLETE CBC W/AUTO DIFF WBC: CPT

## 2018-10-23 PROCEDURE — 93010 ELECTROCARDIOGRAM REPORT: CPT | Mod: ,,, | Performed by: INTERNAL MEDICINE

## 2018-10-23 PROCEDURE — 3074F SYST BP LT 130 MM HG: CPT | Mod: CPTII,,, | Performed by: FAMILY MEDICINE

## 2018-10-23 PROCEDURE — 25000003 PHARM REV CODE 250: Performed by: EMERGENCY MEDICINE

## 2018-10-23 PROCEDURE — 84100 ASSAY OF PHOSPHORUS: CPT

## 2018-10-23 PROCEDURE — 83880 ASSAY OF NATRIURETIC PEPTIDE: CPT

## 2018-10-23 PROCEDURE — S0119 ONDANSETRON 4 MG: HCPCS | Mod: PBBFAC,PO

## 2018-10-23 RX ORDER — ONDANSETRON 4 MG/1
4 TABLET, ORALLY DISINTEGRATING ORAL
Status: COMPLETED | OUTPATIENT
Start: 2018-10-23 | End: 2018-10-23

## 2018-10-23 RX ORDER — ONDANSETRON 4 MG/1
4 TABLET, ORALLY DISINTEGRATING ORAL EVERY 6 HOURS PRN
Qty: 6 TABLET | Refills: 1 | Status: SHIPPED | OUTPATIENT
Start: 2018-10-23 | End: 2019-02-21

## 2018-10-23 RX ORDER — ONDANSETRON 4 MG/1
4 TABLET, FILM COATED ORAL
Status: DISCONTINUED | OUTPATIENT
Start: 2018-10-23 | End: 2018-10-23

## 2018-10-23 RX ADMIN — ONDANSETRON 4 MG: 4 TABLET, ORALLY DISINTEGRATING ORAL at 10:10

## 2018-10-23 RX ADMIN — SODIUM CHLORIDE 500 ML: 0.9 INJECTION, SOLUTION INTRAVENOUS at 01:10

## 2018-10-23 NOTE — ED NOTES
Patient ate 2 sandwiches and drank 2 cups of water. No nausea or vomiting. Will continue to monitor.

## 2018-10-23 NOTE — ED PROVIDER NOTES
Encounter Date: 10/23/2018       History     Chief Complaint   Patient presents with    Emesis     vomiting and diarrhea since yest. seen by dr. king pcp today and sent over to ed     Diarrhea     Relatively poor historian.  Offers little information and answers questions with minimal responses.  Extensive past medical history as outlined, extensive medication list, reviewed.  It appears that he is partially compliant with his medicines but not fully compliant.  He did see [facility] pulmonology yesterday for pulmonary function testing, and had lab work this morning from Dr. King, his primary care provider.  Recent creatinine has noted to rise from 0.8 to 1.7 to 2.1 today.  He feels generally achy, a little dizzy, possible mild chest pain at times, but no other specific complaints.  Denies fever, urinary complaints, nausea, vomiting, diarrhea head, abdominal pain, or back pain. He reports his blood sugars have been in the 100s.  No other specific complaints.      The history is provided by the patient. No  was used.     Review of patient's allergies indicates:  No Known Allergies  Past Medical History:   Diagnosis Date    Acute kidney injury     Arthritis     Asthma     Atrial fibrillation     CHF (congestive heart failure)     Depression     Diabetes mellitus, type 2 Diagnosed in 2000    Elevated PSA     Hypertension     Sleep apnea      Past Surgical History:   Procedure Laterality Date    BIOPSY-ARTERY-TEMPORAL Left 9/1/2017    Performed by Torin Bishop MD at [facility] OR    CHOLECYSTECTOMY      CHOLECYSTECTOMY-LAPAROSCOPIC N/A 3/17/2017    Performed by Louis O. Jeansonne IV, MD at [facility] OR     Family History   Problem Relation Age of Onset    Glaucoma Mother     Cataracts Mother     Cataracts Father     Glaucoma Sister     Cataracts Sister     Glaucoma Maternal Aunt     No Known Problems Brother     No Known Problems Daughter     No Known Problems Son     Prostate cancer  Neg Hx      Social History     Tobacco Use    Smoking status: Never Smoker    Smokeless tobacco: Never Used   Substance Use Topics    Alcohol use: No    Drug use: No     Review of Systems   Constitutional: Negative for chills and fever.   HENT: Negative for congestion, facial swelling, nosebleeds and sinus pressure.    Eyes: Negative for pain and redness.   Respiratory: Negative for chest tightness, shortness of breath and wheezing.    Cardiovascular: Negative for chest pain, palpitations and leg swelling.   Gastrointestinal: Negative for abdominal distention, abdominal pain, diarrhea, nausea and vomiting.   Endocrine: Negative for cold intolerance, polydipsia and polyphagia.   Genitourinary: Negative for difficulty urinating, dysuria, frequency and hematuria.   Musculoskeletal: Positive for arthralgias and myalgias. Negative for back pain and neck pain.   Skin: Negative for color change and rash.   Neurological: Positive for dizziness. Negative for weakness, numbness and headaches.   Hematological: Negative for adenopathy. Does not bruise/bleed easily.   Psychiatric/Behavioral: Negative for agitation and behavioral problems.   All other systems reviewed and are negative.      Physical Exam     Initial Vitals [10/23/18 1137]   BP Pulse Resp Temp SpO2   130/74 68 20 97.8 °F (36.6 °C) 100 %      MAP       --         Physical Exam    Nursing note and vitals reviewed.  Constitutional: He appears well-developed and well-nourished. He is not diaphoretic. No distress.   HENT:   Head: Normocephalic and atraumatic.   Mouth/Throat: Oropharynx is clear and moist. No oropharyngeal exudate.   Eyes: Conjunctivae and EOM are normal. Pupils are equal, round, and reactive to light. Right eye exhibits no discharge. Left eye exhibits no discharge. No scleral icterus.   Neck: Normal range of motion. Neck supple. No thyromegaly present. No tracheal deviation present. No JVD present.   Cardiovascular: Normal rate and normal heart  sounds. Exam reveals no gallop and no friction rub.    No murmur heard.  Pulmonary/Chest: Breath sounds normal. No respiratory distress. He has no wheezes. He has no rhonchi. He has no rales. He exhibits no tenderness.   Abdominal: Soft. Bowel sounds are normal. He exhibits no distension and no mass. There is no tenderness. There is no rebound and no guarding.   Musculoskeletal: Normal range of motion. He exhibits no edema or tenderness.   Lymphadenopathy:     He has no cervical adenopathy.   Neurological: He is alert and oriented to person, place, and time. He has normal strength. No cranial nerve deficit.   Skin: Skin is warm and dry. No rash noted. No erythema.   Psychiatric: He has a normal mood and affect. His behavior is normal. Judgment and thought content normal.   Flat affect         ED Course   Procedures  Labs Reviewed   CBC W/ AUTO DIFFERENTIAL - Abnormal; Notable for the following components:       Result Value    Hemoglobin 13.5 (*)     All other components within normal limits   B-TYPE NATRIURETIC PEPTIDE - Abnormal; Notable for the following components:     (*)     All other components within normal limits   TROPONIN I - Abnormal; Notable for the following components:    Troponin I 0.215 (*)     All other components within normal limits   MAGNESIUM   PHOSPHORUS   CK   CK-MB   URINALYSIS, REFLEX TO URINE CULTURE     EKG Readings: (Independently Interpreted)   Initial Reading: No STEMI. Rhythm: Normal Sinus Rhythm. Heart Rate: 68. Ectopy: No Ectopy.   Normal sinus rhythm at 68 beats per minute, left axis deviation, nonspecific intraventricular conduction delay, possible old anteroseptal infarct.       Imaging Results          X-Ray Chest PA And Lateral (Final result)  Result time 10/23/18 13:35:01    Final result by DARELL Bose Sr., MD (10/23/18 13:35:01)                 Impression:      1. There is no evidence of an acute pulmonary process.  2. There is mild  cardiomegaly.  .      Electronically signed by: Vimal Bose MD  Date:    10/23/2018  Time:    13:35             Narrative:    EXAMINATION:  XR CHEST PA AND LATERAL    CLINICAL HISTORY:  Shortness of breath    COMPARISON:  09/18/2018    FINDINGS:  There is mild cardiomegaly.  There is no evidence of an acute pulmonary process.  There is no pneumothorax or pleural effusion.                              1:15 PM Discussed with Dr. Lambert.  Patient had complained earlier of some nausea and vomiting, is known to be poorly compliant with medications including having missed apparently a week's worth of medicine in the last 2 or 3 weeks.  Elevated creatinine noted.  Will hydrate gently and await further studies.    3:28 PM Feels much better.  Exam is stable. No new complaints or problems.  Ready to go home.  Judged stable for outpatient management with close follow-up with Dr. Lambert.  Encouraged to take his medicines on a regular basis and increase fluid intake.  Zofran as needed for nausea and vomiting.                              Clinical Impression:       1. Volume depletion    2. SOB (shortness of breath)    3. Vomiting, intractability of vomiting not specified, presence of nausea not specified, unspecified vomiting type           Disposition:   Condition: Stable                        Justice Jain MD  10/23/18 1076

## 2018-10-23 NOTE — PROGRESS NOTES
Subjective:       Patient ID: Crow Green is a 63 y.o. male.    Chief Complaint: Follow-up (2 week )    HPI  Came in today for 2 week follow-up.  I have been having him come every 2 weeks for monitoring since he is high risk for readmission secondary to multiple medical comorbidities and poor medication compliance.  Every 2 weeks we have been organizing his medication in to medicine trays which he has 2 weeks worth of.  Unfortunately, today he presents with 1 of the trays that is still nearly completely full even though it has been 2 weeks since he was last here.  He explains the reason for this is some confusion and he says that he thought he took his medication.  Family History   Problem Relation Age of Onset    Glaucoma Mother     Cataracts Mother     Cataracts Father     Glaucoma Sister     Cataracts Sister     Glaucoma Maternal Aunt     No Known Problems Brother     No Known Problems Daughter     No Known Problems Son     Prostate cancer Neg Hx      No current facility-administered medications for this visit.     Current Outpatient Medications:     ALCOHOL ANTISEPTIC PADS (ALCOHOL PREP PADS TOP), , Disp: , Rfl:     amLODIPine (NORVASC) 10 MG tablet, TAKE 1 TABLET ONE TIME DAILY, Disp: 90 tablet, Rfl: 3    ammonium lactate 12 % Crea, Apply 1 Act topically 2 (two) times daily. (Patient taking differently: Apply 1 Act topically once daily. ), Disp: 1 Tube, Rfl: 3    apixaban (ELIQUIS) 5 mg Tab, Take 1 tablet (5 mg total) by mouth 2 (two) times daily., Disp: 180 tablet, Rfl: 2    aspirin (ECOTRIN) 81 MG EC tablet, Take 1 tablet (81 mg total) by mouth once daily., Disp: , Rfl: 0    atorvastatin (LIPITOR) 40 MG tablet, TAKE 1 TABLET EVERY EVENING, Disp: 90 tablet, Rfl: 3    baclofen (LIORESAL) 10 MG tablet, Take 1 tablet (10 mg total) by mouth once daily., Disp: 90 tablet, Rfl: 3    evolocumab (REPATHA SURECLICK) 140 mg/mL PnIj, Inject 140 mg into the skin every 14 (fourteen) days., Disp:  11 mL, Rfl: 3    exenatide microspheres (BYDUREON BCISE) 2 mg/0.85 mL AtIn, Inject 2 mg into the skin every 7 days., Disp: 4 Syringe, Rfl: 11    fluticasone-salmeterol 250-50 mcg/dose (ADVAIR DISKUS) 250-50 mcg/dose diskus inhaler, Inhale 1 puff into the lungs 2 (two) times daily. Controller, Disp: 60 each, Rfl: 11    furosemide (LASIX) 40 MG tablet, Take 2 tablets (80 mg total) by mouth every morning AND 1 tablet (40 mg total) every evening., Disp: 90 tablet, Rfl: 11    gabapentin (NEURONTIN) 600 MG tablet, Take 1 tablet (600 mg total) by mouth 3 (three) times daily., Disp: 90 tablet, Rfl: 1    glimepiride (AMARYL) 2 MG tablet, Take 1 tablet (2 mg total) by mouth before breakfast., Disp: 90 tablet, Rfl: 3    HYDROcodone-acetaminophen (NORCO)  mg per tablet, HYDROCODONE-ACETAMINOPHEN (Norco)  MG ORAL TAB - 1 tab po q 6 hrs prn pain, Disp: 28 tablet, Rfl: 0    insulin glargine (LANTUS SOLOSTAR) 100 unit/mL (3 mL) InPn pen, Inject 50 Units into the skin every evening. (Patient taking differently: Inject 70 Units into the skin every evening. ), Disp: 2 Box, Rfl: 11    insulin syringe-needle U-100 1 mL 31 gauge x 5/16 Syrg, , Disp: , Rfl:     isosorbide mononitrate (IMDUR) 60 MG 24 hr tablet, Take 1 tablet (60 mg total) by mouth once daily., Disp: 90 tablet, Rfl: 3    latanoprost 0.005 % ophthalmic solution, Place 1 drop into both eyes every evening., Disp: 1 Bottle, Rfl: 4    lisinopril 10 MG tablet, Take 1 tablet (10 mg total) by mouth once daily., Disp: 90 tablet, Rfl: 3    metFORMIN (GLUCOPHAGE) 1000 MG tablet, Take 1 tablet (1,000 mg total) by mouth 2 (two) times daily with meals., Disp: 180 tablet, Rfl: 3    metOLazone (ZAROXOLYN) 2.5 MG tablet, Take 1 tablet (2.5 mg total) by mouth once daily., Disp: 90 tablet, Rfl: 3    metoprolol succinate (TOPROL-XL) 50 MG 24 hr tablet, TAKE 1 TABLET EVERY DAY, Disp: 90 tablet, Rfl: 5    nitroGLYCERIN (NITROSTAT) 0.3 MG SL tablet, PLACE 1 TABLET  "UNDER THE TONGUE EVERY 5 MINUTES AS NEEDED FOR CHEST PAIN, NO MORE THAN 3 TABLETS IN ONE DAY, Disp: 100 tablet, Rfl: 0    NOVOLOG FLEXPEN 100 unit/mL InPn pen, ADMINISTER 20 UNITS UNDER THE SKIN THREE TIMES DAILY WITH MEALS, Disp: 15 mL, Rfl: 0    pantoprazole (PROTONIX) 40 MG tablet, Take 1 tablet (40 mg total) by mouth once daily., Disp: 30 tablet, Rfl: 11    pen needle, diabetic (BD INSULIN PEN NEEDLE UF SHORT) 31 gauge x 5/16" Ndle, Inject 1 each into the skin 3 (three) times daily., Disp: 90 each, Rfl: 11    potassium chloride (K-TAB) 20 mEq, Take 1 tablet (20 mEq total) by mouth once daily. for 90 doses, Disp: 90 tablet, Rfl: 3    sertraline (ZOLOFT) 50 MG tablet, TAKE 1 TABLET ONE TIME DAILY, Disp: 90 tablet, Rfl: 3    traZODone (DESYREL) 150 MG tablet, Take 1 tablet (150 mg total) by mouth nightly., Disp: 90 tablet, Rfl: 1    TRUE METRIX AIR GLUCOSE METER kit, TEST FOUR TIMES DAILY BEFORE MEALS  AND EVERY NIGHT, Disp: 1 each, Rfl: 0    VENTOLIN HFA 90 mcg/actuation inhaler, Inhale 2 puffs into the lungs every 4 (four) hours as needed for Wheezing., Disp: 3 Inhaler, Rfl: 4    tiotropium (SPIRIVA WITH HANDIHALER) 18 mcg inhalation capsule, Inhale 1 capsule (18 mcg total) into the lungs once daily. Controller, Disp: 90 capsule, Rfl: 3    Facility-Administered Medications Ordered in Other Visits:     sodium chloride 0.9% bolus 500 mL, 500 mL, Intravenous, ED 1 Time, Justice Jain MD    Review of Systems   Constitutional: Negative for chills and fever.   Eyes: Negative for visual disturbance.   Respiratory: Negative for cough and shortness of breath.    Cardiovascular: Negative for chest pain.   Gastrointestinal: Positive for abdominal pain and nausea.   Neurological: Positive for headaches. Negative for dizziness.       Objective:   /62 (BP Location: Left arm, Patient Position: Sitting, BP Method: Medium (Automatic))   Pulse 74   Temp 97.4 °F (36.3 °C) (Tympanic)   Resp 18   Ht 5' 5" " (1.651 m)   Wt 89.4 kg (197 lb 1.5 oz)   SpO2 98%   BMI 32.80 kg/m²      Physical Exam   Constitutional: He is oriented to person, place, and time. He appears well-developed and well-nourished.   Appeared uncomfortable   HENT:   Head: Normocephalic and atraumatic.   Eyes: Conjunctivae are normal.   Cardiovascular: Normal rate.   Pulmonary/Chest: Effort normal. No respiratory distress.   Abdominal: He exhibits no mass. There is tenderness. There is no rebound and no guarding.   Musculoskeletal: He exhibits no edema.   Neurological: He is alert and oriented to person, place, and time. Coordination normal.   Skin: Skin is warm and dry. No rash noted.   Psychiatric: He has a normal mood and affect. His behavior is normal.   Vitals reviewed.      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  Sending for IV fluids in the emergency room.  Opted not to do this in the clinic due to his history of CHF and need for more rigorous monitoring.  This episode is again another reason why he would benefit from home health to help him be more compliant with medications with a regimen that is pretty complicated.         Relevant Medications    ondansetron disintegrating tablet 4 mg (Completed)       Other    At risk for medication noncompliance    Relevant Orders    SUBSEQUENT HOME HEALTH ORDERS            No Follow-up on file.

## 2018-10-23 NOTE — ED NOTES
Patient supine on Er stretcher. Flat affect. Skin warm and dry resp even and unlabored. Patient has no complaints at present. BBSCTA . Will continue to monitor.

## 2018-10-23 NOTE — DISCHARGE INSTRUCTIONS
_______________    Tests show that you or a little dehydrated.  Increase your fluid intake and see Dr. Lambert in 2 or 3 days for a recheck.  Use the medicine as labeled if needed for any vomiting.  Continue usual medicines. Return to the ER as needed.    ________________

## 2018-10-23 NOTE — ASSESSMENT & PLAN NOTE
This could be part of the reason why he is having nausea and discomfort today.  Sending for IV fluids in the emergency room.  Opted not to do this in the clinic due to his history of CHF and need for more rigorous monitoring.  This episode is again another reason why he would benefit from home health to help him be more compliant with medications with a regimen that is pretty complicated.

## 2018-10-23 NOTE — ED NOTES
Pt presents to ER from PCP office for elevate kidney function. Also reports vomiting and diarrhea. Denies any abdominal pain or other complaints.     Level of Consciousness: The patient is awake, alert, and oriented to person, place and time. Pts affect is appropriate, speech is appropriate.   Appearance: Pt is resting in stretcher, no acute distress is noted. Clothing and hygiene are appropriate.   Skin: Skin is W/D/I. Normal skin turgor. Mucous membranes are moist. Skin color normal.   Musculoskeletal: Moves all extremities well, full range of motion. No obvious deformities noted. Pt ambulates independently with assistance of a walker.   Respiratory: Airway open and patent. Respiration rate even and unlabored. No use of accessory muscles noted. Breath sounds clear to ausculation.   Cardiac: Regular rate and rhythm. No peripheral edema noted. Peripheral pulses palpated. Capillary refill brisk.   Abdomen: No distension noted. Soft and non-tender to palpation. Active bowel sounds in all quads.   Neurologic: Symmetrical expression noted in face. Hand grasps equal. Normal sensation reported in all extremities. No obvious neurological deficits noted.     Pt made aware of plan of care, verbalizes understanding and denies any questions at this time. Bed low and locked, side rails upx2. Call light in reach. Cardiac monitor applied, alarms on. Will continue to monitor patient.

## 2018-10-23 NOTE — MEDICAL/APP STUDENT
"Subjective:       Patient ID: Crow Green is a 63 y.o. male.    Chief Complaint: Follow-up (2 week )    Left hand "jumping" and leg    Didn't take a full week of medication - reports he didn't realize, "cant think"     Chest/abdominal pain    Threw up this am          Review of Systems    Objective:      Physical Exam    Assessment:       No diagnosis found.    Plan:         "

## 2018-10-25 ENCOUNTER — TELEPHONE (OUTPATIENT)
Dept: INTERNAL MEDICINE | Facility: CLINIC | Age: 63
End: 2018-10-25

## 2018-10-25 DIAGNOSIS — E11.42 DIABETIC POLYNEUROPATHY ASSOCIATED WITH TYPE 2 DIABETES MELLITUS: Primary | ICD-10-CM

## 2018-10-25 NOTE — TELEPHONE ENCOUNTER
Spoke with pt about order for Diabetic shoes. Pt would like an order put in. Pt will contact insurance company to see if he can get them somewhere closer than [place]. Pt advised to call back with information to have order faxed to Pt voiced understanding.

## 2018-10-25 NOTE — TELEPHONE ENCOUNTER
----- Message from Lucille Campbell sent at 10/25/2018  2:10 PM CDT -----  Contact: Patient  Patient is returning a call, please call them back at [phone]. Thank you

## 2018-10-25 NOTE — TELEPHONE ENCOUNTER
----- Message from Jane Castelan sent at 10/25/2018 12:49 PM CDT -----  Contact: Pt.   Pt is calling popeye requesting to have script sent for Diabetetic Shoes: ..[phone]  Nancy (fiiting for Shoes)  [facility]   [address] [place] [address]   [phone]

## 2018-10-26 ENCOUNTER — TELEPHONE (OUTPATIENT)
Dept: INTERNAL MEDICINE | Facility: CLINIC | Age: 63
End: 2018-10-26

## 2018-10-26 NOTE — TELEPHONE ENCOUNTER
Returned call to pt about diabetic shoes. Informed pt that the order has been signed and will fax it to  in [place]

## 2018-10-26 NOTE — TELEPHONE ENCOUNTER
----- Message from Awilda Jamison sent at 10/26/2018 10:40 AM CDT -----  Contact: self [phone]  States that he is waiting for a call from nurse regarding his diabetic shoes. Please call back at [phone]//thank you acc

## 2018-10-29 ENCOUNTER — TELEPHONE (OUTPATIENT)
Dept: PAIN MEDICINE | Facility: CLINIC | Age: 63
End: 2018-10-29

## 2018-10-29 ENCOUNTER — TELEPHONE (OUTPATIENT)
Dept: INTERNAL MEDICINE | Facility: CLINIC | Age: 63
End: 2018-10-29

## 2018-10-29 RX ORDER — HYDROCODONE BITARTRATE AND ACETAMINOPHEN 10; 325 MG/1; MG/1
TABLET ORAL
Qty: 28 TABLET | Refills: 0 | Status: SHIPPED | OUTPATIENT
Start: 2018-10-29 | End: 2018-11-29 | Stop reason: SDUPTHER

## 2018-10-29 NOTE — TELEPHONE ENCOUNTER
----- Message from Fransisca Saleem MA sent at 10/29/2018 12:55 PM CDT -----  Contact: pt       ----- Message -----  From: Monique Orlando LPN  Sent: 10/29/2018  12:53 PM  To: Aileen LAINEZ Staff        ----- Message -----  From: Anna Bashir  Sent: 10/29/2018  12:02 PM  To: Fausto WARNER Staff    Pt states that he need to reschedule injection for his back.    .[phone] (home) [phone] (work)

## 2018-10-29 NOTE — TELEPHONE ENCOUNTER
Attempted to contact pt x 3.  states unavailable. Work number rings with no answer. Unable to leave message. //lp

## 2018-10-29 NOTE — TELEPHONE ENCOUNTER
----- Message from Anna Mckay sent at 10/29/2018 12:01 PM CDT -----  Contact: pt   1. What is the name of the medication you are requesting? HYDROcodone-acetaminophen (NORCO)  2. What is the dose?   mg per tablet  3. How do you take the medication? Orally, topically, etc? Oral   4. How often do you take this medication?   5. Do you need a 30 day or 90 day supply? 30  6. How many refills are you requesting? 1  7. What is your preferred pharmacy and location of the pharmacy?   [facility] in [place]     8. Who can we contact with further questions? the patient

## 2018-10-31 ENCOUNTER — TELEPHONE (OUTPATIENT)
Dept: PHARMACY | Facility: CLINIC | Age: 63
End: 2018-10-31

## 2018-11-01 ENCOUNTER — TELEPHONE (OUTPATIENT)
Dept: ADMINISTRATIVE | Facility: CLINIC | Age: 63
End: 2018-11-01

## 2018-11-06 NOTE — TELEPHONE ENCOUNTER
Patient informed of approval of Repatha 140mg/ml Sureclick injections - $8.35 (CC info needed on file). Confirmed two patient identifiers - name + . He states that he has NEVER been on the Repatha and will need a consultation, injection training, and to set up his initial shipment of medication - scheduled callback for 18 at 10am. TTN

## 2018-11-07 NOTE — TELEPHONE ENCOUNTER
Initial Repatha 140mg/ml Sureclick PENS consult completed on 18 . Confirmed 2 patient identifiers - name and . Therapy Appropriate.  --Injection experience: currently on insulin.    Counseled patient on administration directions:  - Inject Repatha 140mg/ml Sureclick PENS into the skin every 14 days.   - Take out of the refrigerator 30-60 minutes prior to injection.  - Wash hands before and after injection.  - Monthly RX will come with gauze, bandaids, and alcohol swabs.  - Patient may inject in either the tops of the thighs, abdomen- but at least 2 inches away from her belly button, or the outer part of her upper arm.  Patient was instructed to rotate injections sites.  - Patient is to wipe down the injection site with the alcohol pad, wait to dry.  Gently squeeze the area of the cleaned skin and hold it firmly.   Place the pen flat against the raised area of skin that is being squeezed, then push down on the button and release,  in 10-15 seconds you will hear a click and the window will go from from clear to yellow, indicating injection is complete.  - Patient should rotate injection sites.   - Patient will use sharps container; once full, per TYRA hanna, she/ he may lock the sharps container and place in her trash. She/ he can then contact the Pharmacy and we will replace the sharps at no additional charge.    Patient was counseled on possible side effects:  - Injection site reaction: redness, soreness, itching, bruising, which should resolve within 3-5 days.  - flu-like symptoms    DDIs: none. Medication list reviewed. Patient advised to call [facility] prior to starting any new medications - OTC, RX, or herbal.    Patient did not have any further questions. He was advised to keep a calendar to stay compliant. Consultation included: indication; goals of treatment; administration; storage and handling; side effects; how to handle side effects; the importance of compliance; how to handle missed doses; the importance  of laboratory monitoring; the importance of keeping all follow up appointments.  Patient understands to report any medication changes to [facility] and provider. All questions answered and addressed to patients satisfaction. Upon completion of consult and injection training, patient states that he cannot afford the $8.35 copay b/c he is on a fixed income, forwarded to financial assistance team to help.

## 2018-11-08 ENCOUNTER — TELEPHONE (OUTPATIENT)
Dept: PAIN MEDICINE | Facility: CLINIC | Age: 63
End: 2018-11-08

## 2018-11-08 NOTE — TELEPHONE ENCOUNTER
Contacted patient; patient is stating that he needs to reschedule injection. Informed patient that he does not have an appointment for any procedure; I will have Kimmie with Dr. Orr contact patient to reschedule injections.     ----- Message from Jeniffer Montes sent at 11/8/2018  2:50 PM CST -----  needs c/b to r/s inj...[phone]

## 2018-11-09 ENCOUNTER — TELEPHONE (OUTPATIENT)
Dept: CARDIOLOGY | Facility: CLINIC | Age: 63
End: 2018-11-09

## 2018-11-09 DIAGNOSIS — M54.16 LUMBAR RADICULITIS: Primary | ICD-10-CM

## 2018-11-09 NOTE — TELEPHONE ENCOUNTER
Ok to hold ASA for 5 days and Eliquis 2 days before the scheduled procedure. Please resume post-op ASAP.

## 2018-11-09 NOTE — TELEPHONE ENCOUNTER
----- Message from Kimmie Granados LPN sent at 11/9/2018  1:19 PM CST -----  Good Morning. Dr. Tsai request to perform transforaminal PITO for patient. Pt is currently on Eliquis 5mg and will need to d/c 1 (one) week prior to having this injection along with all other blood thinners including aspirin. Please advise or contact me at ext [phone] with questions. Thanks//lp      ----- Message -----  From: Zack Rosas LPN  Sent: 11/8/2018   2:58 PM  To: Kimmie Granados LPN    This patient is stating that he is calling to reschedule injections? I do not see anything that is scheduled nor do I see any orders for injections to schedule. Can you please check into this and call patient to schedule injections?    Thanks    ----- Message -----  From: Jeniffer Montes  Sent: 11/8/2018   2:50 PM  To: Aileen LAINEZ Staff    needs c/b to r/s inj...[phone]

## 2018-11-16 ENCOUNTER — CLINICAL SUPPORT (OUTPATIENT)
Dept: PULMONOLOGY | Facility: CLINIC | Age: 63
End: 2018-11-16
Payer: MEDICARE

## 2018-11-16 VITALS — WEIGHT: 194.69 LBS | BODY MASS INDEX: 32.44 KG/M2 | HEIGHT: 65 IN

## 2018-11-16 DIAGNOSIS — R06.02 SHORTNESS OF BREATH ON EXERTION: ICD-10-CM

## 2018-11-16 PROCEDURE — 94618 PULMONARY STRESS TESTING: CPT | Mod: HCNC,S$GLB,, | Performed by: INTERNAL MEDICINE

## 2018-11-16 NOTE — PROCEDURES
"[facility]  Six Minute Walk     SUMMARY     Ordering Provider: Eri Armstrong NP   Interpreting Provider: Dr Alicea  Performing nurse/tech/RT: KELSY Alves RRT  Diagnosis: Shortness of Breath(on exertion)  Height: 5' 5" (165.1 cm)  Weight: 88.3 kg (194 lb 10.7 oz)  BMI (Calculated): 32.5   Patient Race:             Phase Oxygen Assessment Supplemental O2 Heart   Rate Blood Pressure Bridgette Dyspnea Scale Rating   Resting 97 % Room Air 66 bpm 117/78 2   Exercise        Minute        1 98 % Room Air 90 bpm     2 98 % Room Air 88 bpm     3 97 % Room Air 93 bpm     4 98 % Room Air 93 bpm     5 98 % Room Air 96 bpm     6  97 % Room Air 99 bpm 121/75 3   Recovery        Minute        1 98 % Room Air 93 bpm     2 98 % Room Air 93 bpm     3 98 % Room Air 91 bpm     4 98 % Room Air 90 bpm 115/68 2     Six Minute Walk Summary  6MWT Status: completed without stopping  Patient Reported: Dizziness, Dyspnea, Lightheadedness(pain in both legs and hips,fingers tingling)     Interpretation:  Did the patient stop or pause?: No                                         Total Time Walked (Calculated): 360 seconds  Final Partial Lap Distance (feet): 50 feet  Total Distance Meters (Calculated): 259.08 meters  Predicted Distance Meters (Calculated): 479.18 meters  Percentage of Predicted (Calculated): 54.07  Peak VO2 (Calculated): 11.75  Mets: 3.36  Has The Patient Had a Previous Six Minute Walk Test?: No       Previous 6MWT Results  Has The Patient Had a Previous Six Minute Walk Test?: No      Interpretation:  Total distance walked in six minutes is moderately reduced indicating a reduction in overall  functional capacity. There was no exercise induced hypoxemia with significant oxygen desaturation.    Oxygen desaturation did not meet criteria for supplemental oxygen prescription.    Clinical correlation suggested.    Juancho Alicea MD    Mild exercise-induced hypoxemia described as an arterial oxygen saturation of " 93-95% (or 3-4% less than at rest), moderate exercise-induced hypoxemia as 89-93%, and severe exercise induced hypoxemia as < 89% O2 saturation.  Medicare Criteria Comments:   When arterial oxygen saturation is at or below 88% during exercise (severe exercise induced hypoxemia) then the patient falls under Medicare Group 1 criteria for supplemental oxygen.  Details about Medicare Group Criteria coverage can be found at http://www.cms.[facility].gov/manuals/downloads/

## 2018-11-20 ENCOUNTER — TELEPHONE (OUTPATIENT)
Dept: INTERNAL MEDICINE | Facility: CLINIC | Age: 63
End: 2018-11-20

## 2018-11-20 NOTE — TELEPHONE ENCOUNTER
Pt call returned, pt advised too soon for refills. Pt to contact office on the 29 for refills. Pt voiced understanding

## 2018-11-20 NOTE — TELEPHONE ENCOUNTER
----- Message from Laurence Pena sent at 11/20/2018  2:30 PM CST -----  Contact: pt  Calling in regards to out of pain medicine and send to [facility] in [place] and to give him a call once it has been sent or if any questions and please advise . [phone] (home) [phone] (work)

## 2018-11-28 ENCOUNTER — NURSE TRIAGE (OUTPATIENT)
Dept: ADMINISTRATIVE | Facility: CLINIC | Age: 63
End: 2018-11-28

## 2018-11-28 NOTE — TELEPHONE ENCOUNTER
Called and spoke with Home Health nurse, Rosa, who stated patient had a cough with nasal congestion. No wheezing. Patient also has a 5 lbs weight gain since was last seen on 11/13/18 according to there records. Patient HR was 103 and R was 28. Please advise

## 2018-11-29 NOTE — TELEPHONE ENCOUNTER
Nurse returned call to pt, pt stated he was following up on medication refill. Pt has symptoms of SOB, blurred vision and coughing. Pt stated hh nurse didn't know whether to have him go to Er or schedule appt. Dr. Gregorio schedule full with no openings.  Pt advised by nurse due to health condition, pt should be elevated in ER . Pt voiced understanding and will go to ER

## 2018-11-29 NOTE — TELEPHONE ENCOUNTER
Call and spoke with dina Lee Dr., patient is to take OTC cough and nasal medicine, increase fluid and if get worse within the next 48-72 hour return to clinic.

## 2018-11-29 NOTE — TELEPHONE ENCOUNTER
----- Message from Carla Dillard sent at 11/29/2018 11:30 AM CST -----  Contact: self  Pt requesting a call back once sent to pharmacy.  1. What is the name of the medication you are requesting? Pain medication  2. What is the dose? 10 mg   3. How do you take the medication? Orally, topically, etc? orally  4. How often do you take this medication? Twice a day  5. Do you need a 30 day or 90 day supply? n/a  6. How many refills are you requesting? n/a  7. What is your preferred pharmacy and location of the pharmacy?  [facility]    8. Who can we contact with further questions? Self

## 2018-11-29 NOTE — TELEPHONE ENCOUNTER
----- Message from Carla Dillard sent at 11/29/2018 12:55 PM CST -----  Contact: self   Requesting a call back regarding status of rx refill request.please call back at [phone].      Thanks,  Carla Dillard

## 2018-11-30 ENCOUNTER — OFFICE VISIT (OUTPATIENT)
Dept: PODIATRY | Facility: CLINIC | Age: 63
End: 2018-11-30
Payer: MEDICARE

## 2018-11-30 VITALS
DIASTOLIC BLOOD PRESSURE: 76 MMHG | HEART RATE: 87 BPM | WEIGHT: 196.88 LBS | BODY MASS INDEX: 32.8 KG/M2 | SYSTOLIC BLOOD PRESSURE: 138 MMHG | HEIGHT: 65 IN

## 2018-11-30 DIAGNOSIS — B35.1 DERMATOPHYTOSIS OF NAIL: ICD-10-CM

## 2018-11-30 DIAGNOSIS — E11.49 TYPE II DIABETES MELLITUS WITH NEUROLOGICAL MANIFESTATIONS: Primary | ICD-10-CM

## 2018-11-30 PROCEDURE — 11721 DEBRIDE NAIL 6 OR MORE: CPT | Mod: Q9,HCNC,S$GLB, | Performed by: PODIATRIST

## 2018-11-30 PROCEDURE — 99999 PR PBB SHADOW E&M-EST. PATIENT-LVL III: CPT | Mod: PBBFAC,HCNC,, | Performed by: PODIATRIST

## 2018-11-30 PROCEDURE — 99499 UNLISTED E&M SERVICE: CPT | Mod: HCNC,S$GLB,, | Performed by: PODIATRIST

## 2018-12-02 ENCOUNTER — HOSPITAL ENCOUNTER (EMERGENCY)
Facility: HOSPITAL | Age: 63
Discharge: HOME | End: 2018-12-02
Attending: EMERGENCY MEDICINE
Payer: MEDICARE

## 2018-12-02 VITALS
BODY MASS INDEX: 33.37 KG/M2 | DIASTOLIC BLOOD PRESSURE: 65 MMHG | SYSTOLIC BLOOD PRESSURE: 132 MMHG | WEIGHT: 200.31 LBS | HEART RATE: 101 BPM | HEIGHT: 65 IN | OXYGEN SATURATION: 98 % | RESPIRATION RATE: 21 BRPM | TEMPERATURE: 98 F

## 2018-12-02 DIAGNOSIS — R06.02 SHORTNESS OF BREATH: ICD-10-CM

## 2018-12-02 DIAGNOSIS — Z79.01 CHRONIC ANTICOAGULATION: ICD-10-CM

## 2018-12-02 DIAGNOSIS — Z79.4 TYPE 2 DIABETES MELLITUS WITH HYPERGLYCEMIA, WITH LONG-TERM CURRENT USE OF INSULIN: ICD-10-CM

## 2018-12-02 DIAGNOSIS — E11.65 TYPE 2 DIABETES MELLITUS WITH HYPERGLYCEMIA, WITH LONG-TERM CURRENT USE OF INSULIN: ICD-10-CM

## 2018-12-02 DIAGNOSIS — J40 BRONCHITIS: Primary | ICD-10-CM

## 2018-12-02 DIAGNOSIS — I48.20 CHRONIC ATRIAL FIBRILLATION: ICD-10-CM

## 2018-12-02 LAB
ALBUMIN SERPL BCP-MCNC: 3.1 G/DL
ALP SERPL-CCNC: 60 U/L
ALT SERPL W/O P-5'-P-CCNC: 20 U/L
ANION GAP SERPL CALC-SCNC: 11 MMOL/L
APTT BLDCRRT: 25.7 SEC
AST SERPL-CCNC: 21 U/L
BASOPHILS # BLD AUTO: 0.01 K/UL
BASOPHILS NFR BLD: 0.1 %
BILIRUB SERPL-MCNC: 0.3 MG/DL
BNP SERPL-MCNC: 95 PG/ML
BUN SERPL-MCNC: 15 MG/DL
CALCIUM SERPL-MCNC: 9.1 MG/DL
CHLORIDE SERPL-SCNC: 103 MMOL/L
CO2 SERPL-SCNC: 24 MMOL/L
CREAT SERPL-MCNC: 1.1 MG/DL
DIFFERENTIAL METHOD: ABNORMAL
EOSINOPHIL # BLD AUTO: 0.4 K/UL
EOSINOPHIL NFR BLD: 3.7 %
ERYTHROCYTE [DISTWIDTH] IN BLOOD BY AUTOMATED COUNT: 14.7 %
EST. GFR  (AFRICAN AMERICAN): >60 ML/MIN/1.73 M^2
EST. GFR  (NON AFRICAN AMERICAN): >60 ML/MIN/1.73 M^2
GLUCOSE SERPL-MCNC: 203 MG/DL
HCT VFR BLD AUTO: 36.3 %
HGB BLD-MCNC: 12.2 G/DL
INR PPP: 0.9
LYMPHOCYTES # BLD AUTO: 2.8 K/UL
LYMPHOCYTES NFR BLD: 27.8 %
MCH RBC QN AUTO: 27.6 PG
MCHC RBC AUTO-ENTMCNC: 33.6 G/DL
MCV RBC AUTO: 82 FL
MONOCYTES # BLD AUTO: 0.9 K/UL
MONOCYTES NFR BLD: 8.8 %
NEUTROPHILS # BLD AUTO: 6.1 K/UL
NEUTROPHILS NFR BLD: 59.7 %
PLATELET # BLD AUTO: 174 K/UL
PMV BLD AUTO: 10 FL
POTASSIUM SERPL-SCNC: 3.5 MMOL/L
PROT SERPL-MCNC: 7.4 G/DL
PROTHROMBIN TIME: 10.3 SEC
RBC # BLD AUTO: 4.42 M/UL
SODIUM SERPL-SCNC: 138 MMOL/L
TROPONIN I SERPL DL<=0.01 NG/ML-MCNC: 0.15 NG/ML
WBC # BLD AUTO: 10.22 K/UL

## 2018-12-02 PROCEDURE — 99285 EMERGENCY DEPT VISIT HI MDM: CPT | Mod: HCNC,25

## 2018-12-02 PROCEDURE — 84484 ASSAY OF TROPONIN QUANT: CPT | Mod: HCNC

## 2018-12-02 PROCEDURE — 85025 COMPLETE CBC W/AUTO DIFF WBC: CPT | Mod: HCNC

## 2018-12-02 PROCEDURE — 80053 COMPREHEN METABOLIC PANEL: CPT | Mod: HCNC

## 2018-12-02 PROCEDURE — 25000242 PHARM REV CODE 250 ALT 637 W/ HCPCS: Mod: HCNC | Performed by: EMERGENCY MEDICINE

## 2018-12-02 PROCEDURE — 36415 COLL VENOUS BLD VENIPUNCTURE: CPT | Mod: HCNC

## 2018-12-02 PROCEDURE — 83880 ASSAY OF NATRIURETIC PEPTIDE: CPT | Mod: HCNC

## 2018-12-02 PROCEDURE — 94640 AIRWAY INHALATION TREATMENT: CPT | Mod: HCNC

## 2018-12-02 PROCEDURE — 93010 ELECTROCARDIOGRAM REPORT: CPT | Mod: HCNC,,, | Performed by: INTERNAL MEDICINE

## 2018-12-02 PROCEDURE — 93005 ELECTROCARDIOGRAM TRACING: CPT | Mod: HCNC

## 2018-12-02 PROCEDURE — 85730 THROMBOPLASTIN TIME PARTIAL: CPT | Mod: HCNC

## 2018-12-02 PROCEDURE — 85610 PROTHROMBIN TIME: CPT | Mod: HCNC

## 2018-12-02 RX ORDER — AZITHROMYCIN 250 MG/1
250 TABLET, FILM COATED ORAL DAILY
Qty: 6 TABLET | Refills: 0 | Status: SHIPPED | OUTPATIENT
Start: 2018-12-02 | End: 2018-12-10 | Stop reason: ALTCHOICE

## 2018-12-02 RX ORDER — IPRATROPIUM BROMIDE AND ALBUTEROL SULFATE 2.5; .5 MG/3ML; MG/3ML
3 SOLUTION RESPIRATORY (INHALATION)
Status: COMPLETED | OUTPATIENT
Start: 2018-12-02 | End: 2018-12-02

## 2018-12-02 RX ORDER — AZITHROMYCIN 250 MG/1
250 TABLET, FILM COATED ORAL DAILY
Qty: 6 TABLET | Refills: 0 | Status: SHIPPED | OUTPATIENT
Start: 2018-12-02 | End: 2018-12-02 | Stop reason: SDUPTHER

## 2018-12-02 RX ORDER — PROMETHAZINE HYDROCHLORIDE AND CODEINE PHOSPHATE 6.25; 1 MG/5ML; MG/5ML
5 SOLUTION ORAL EVERY 4 HOURS PRN
Qty: 118 ML | Refills: 0 | Status: SHIPPED | OUTPATIENT
Start: 2018-12-02 | End: 2018-12-12

## 2018-12-02 RX ORDER — HYDROCODONE BITARTRATE AND ACETAMINOPHEN 10; 325 MG/1; MG/1
TABLET ORAL
Qty: 28 TABLET | Refills: 0 | Status: SHIPPED | OUTPATIENT
Start: 2018-12-02 | End: 2019-01-02 | Stop reason: SDUPTHER

## 2018-12-02 RX ADMIN — IPRATROPIUM BROMIDE AND ALBUTEROL SULFATE 3 ML: .5; 3 SOLUTION RESPIRATORY (INHALATION) at 06:12

## 2018-12-03 ENCOUNTER — TELEPHONE (OUTPATIENT)
Dept: INTERNAL MEDICINE | Facility: CLINIC | Age: 63
End: 2018-12-03

## 2018-12-03 NOTE — TELEPHONE ENCOUNTER
Spoke with about medication request. Informed pt that medication has been sent to pharmacy on 12/2/2018. Scheduled py for a/u appt 12/10/2018

## 2018-12-03 NOTE — ED PROVIDER NOTES
SCRIBE #1 NOTE: I, Natali Fernandez, am scribing for, and in the presence of, Ewelina Kay MD. I have scribed the entire note.      History      Chief Complaint   Patient presents with    Shortness of Breath     with cough x1 wk       Review of patient's allergies indicates:  No Known Allergies     HPI   HPI    12/2/2018, 6:07 PM   History obtained from the patient      History of Present Illness: Crow Green is a 63 y.o. male patient with a PMHx CHF, asthma, A-fib, DM Type II, HTN, who presents to the Emergency Department for worsening SOB which onset gradually 1 week ago. Symptoms are constant and moderate in severity. Sxs exacerbated with exertion. No mitigating factors reported. Associated sxs include subjective fever and cough. Patient denies any CP, pain with breathing, hemtopysis, diaphoresis, palpitations, extremity weakness/numbness, leg pain/swelling, dizziness, chills, abd pain, n/v, chest tightness, unexpected weight gain, and all other sxs at this time. Pt is a poor historian. No further complaints or concerns at this time.         Arrival mode: AASI    PCP: Mateo Lambert DO       Past Medical History:  Past Medical History:   Diagnosis Date    Acute kidney injury     Arthritis     Asthma     Atrial fibrillation     CHF (congestive heart failure)     Depression     Diabetes mellitus, type 2 Diagnosed in 2000    Elevated PSA     Hypertension     Sleep apnea        Past Surgical History:  Past Surgical History:   Procedure Laterality Date    BIOPSY-ARTERY-TEMPORAL Left 9/1/2017    Performed by [facility]in [facility] at [facility] OR    CHOLECYSTECTOMY      CHOLECYSTECTOMY-LAPAROSCOPIC N/A 3/17/2017    Performed by Louis O. Jeansonne IV, MD at [facility] OR         Family History:  Family History   Problem Relation Age of Onset    Glaucoma Mother     Cataracts Mother     Cataracts Father     Glaucoma Sister     Cataracts Sister     Glaucoma Maternal Aunt     No Known Problems Brother      No Known Problems Daughter     No Known Problems Son     Prostate cancer Neg Hx        Social History:  Social History     Tobacco Use    Smoking status: Never Smoker    Smokeless tobacco: Never Used   Substance and Sexual Activity    Alcohol use: No    Drug use: No    Sexual activity: Not Currently       ROS   Review of Systems   Constitutional: Positive for fever (subjective). Negative for chills, diaphoresis and unexpected weight change.   HENT: Negative for sore throat.    Respiratory: Positive for cough and shortness of breath. Negative for chest tightness.    Cardiovascular: Negative for chest pain, palpitations and leg swelling.   Gastrointestinal: Negative for abdominal pain, nausea and vomiting.   Genitourinary: Negative for dysuria.   Musculoskeletal: Negative for back pain and neck pain.   Skin: Negative for rash.   Neurological: Negative for dizziness, weakness and numbness.   Hematological: Does not bruise/bleed easily.   All other systems reviewed and are negative.      Physical Exam      Initial Vitals [12/02/18 1801]   BP Pulse Resp Temp SpO2   (!) 142/76 92 (!) 24 98.1 °F (36.7 °C) 98 %      MAP       --          Physical Exam  Nursing Notes and Vital Signs Reviewed.  Constitutional: Patient is in no acute distress. Well-developed and well-nourished.  Head: Atraumatic. Normocephalic.  Eyes: PERRL. EOM intact. Conjunctivae are not pale. No scleral icterus.  ENT: Mucous membranes are moist. Oropharynx is clear and symmetric.    Neck: Supple. Full ROM. No lymphadenopathy.  Cardiovascular: Regular rate. Irregularly irregular rhythm. No murmurs, rubs, or gallops. Distal pulses are 2+ and symmetric.  Pulmonary/Chest: No respiratory distress. Clear to auscultation bilaterally. No wheezing or rales. Dry cough.  Abdominal: Soft and non-distended.  There is no tenderness.  No rebound, guarding, or rigidity.   Musculoskeletal: Moves all extremities. No obvious deformities. No edema. No calf  "tenderness.  Skin: Warm and dry.  Neurological:  Alert, awake, and appropriate.  Normal speech.  No acute focal neurological deficits are appreciated.  Psychiatric: Normal affect. Good eye contact. Appropriate in content.    ED Course    Procedures  ED Vital Signs:  Vitals:    12/02/18 1801 12/02/18 1820 12/02/18 1833 12/02/18 1931   BP: (!) 142/76   (!) 147/77   Pulse: 92 91 94 100   Resp: (!) 24 (!) 22  (!) 22   Temp: 98.1 °F (36.7 °C)      TempSrc: Oral      SpO2: 98% 98%  95%   Weight: 90.9 kg (200 lb 4.8 oz)      Height: 5' 5" (1.651 m)       12/02/18 1946 12/02/18 2015   BP: (!) 149/85 132/65   Pulse: 99 101   Resp: (!) 25 (!) 21   Temp:     TempSrc:     SpO2: 95% 98%   Weight:     Height:         Abnormal Lab Results:  Labs Reviewed   CBC W/ AUTO DIFFERENTIAL - Abnormal; Notable for the following components:       Result Value    RBC 4.42 (*)     Hemoglobin 12.2 (*)     Hematocrit 36.3 (*)     RDW 14.7 (*)     All other components within normal limits   COMPREHENSIVE METABOLIC PANEL - Abnormal; Notable for the following components:    Glucose 203 (*)     Albumin 3.1 (*)     All other components within normal limits   TROPONIN I - Abnormal; Notable for the following components:    Troponin I 0.147 (*)     All other components within normal limits   B-TYPE NATRIURETIC PEPTIDE   PROTIME-INR   APTT        All Lab Results:  Results for orders placed or performed during the hospital encounter of 12/02/18   CBC auto differential   Result Value Ref Range    WBC 10.22 3.90 - 12.70 K/uL    RBC 4.42 (L) 4.60 - 6.20 M/uL    Hemoglobin 12.2 (L) 14.0 - 18.0 g/dL    Hematocrit 36.3 (L) 40.0 - 54.0 %    MCV 82 82 - 98 fL    MCH 27.6 27.0 - 31.0 pg    MCHC 33.6 32.0 - 36.0 g/dL    RDW 14.7 (H) 11.5 - 14.5 %    Platelets 174 150 - 350 K/uL    MPV 10.0 9.2 - 12.9 fL    Gran # (ANC) 6.1 1.8 - 7.7 K/uL    Lymph # 2.8 1.0 - 4.8 K/uL    Mono # 0.9 0.3 - 1.0 K/uL    Eos # 0.4 0.0 - 0.5 K/uL    Baso # 0.01 0.00 - 0.20 K/uL    Gran% " 59.7 38.0 - 73.0 %    Lymph% 27.8 18.0 - 48.0 %    Mono% 8.8 4.0 - 15.0 %    Eosinophil% 3.7 0.0 - 8.0 %    Basophil% 0.1 0.0 - 1.9 %    Differential Method Automated    Comprehensive metabolic panel   Result Value Ref Range    Sodium 138 136 - 145 mmol/L    Potassium 3.5 3.5 - 5.1 mmol/L    Chloride 103 95 - 110 mmol/L    CO2 24 23 - 29 mmol/L    Glucose 203 (H) 70 - 110 mg/dL    BUN, Bld 15 8 - 23 mg/dL    Creatinine 1.1 0.5 - 1.4 mg/dL    Calcium 9.1 8.7 - 10.5 mg/dL    Total Protein 7.4 6.0 - 8.4 g/dL    Albumin 3.1 (L) 3.5 - 5.2 g/dL    Total Bilirubin 0.3 0.1 - 1.0 mg/dL    Alkaline Phosphatase 60 55 - 135 U/L    AST 21 10 - 40 U/L    ALT 20 10 - 44 U/L    Anion Gap 11 8 - 16 mmol/L    eGFR if African American >60 >60 mL/min/1.73 m^2    eGFR if non African American >60 >60 mL/min/1.73 m^2   Troponin I #1   Result Value Ref Range    Troponin I 0.147 (H) 0.000 - 0.026 ng/mL   B-Type natriuretic peptide (BNP)   Result Value Ref Range    BNP 95 0 - 99 pg/mL   Protime-INR   Result Value Ref Range    Prothrombin Time 10.3 9.0 - 12.5 sec    INR 0.9 0.8 - 1.2   APTT   Result Value Ref Range    aPTT 25.7 21.0 - 32.0 sec         Imaging Results:  Imaging Results          X-Ray Chest PA And Lateral (Final result)  Result time 12/02/18 19:08:11    Final result by Sae Velazquez MD (12/02/18 19:08:11)                 Impression:      No acute cardiopulmonary process.      Electronically signed by: Sae Velazquez MD  Date:    12/02/2018  Time:    19:08             Narrative:    EXAMINATION:  XR CHEST PA AND LATERAL    CLINICAL HISTORY:  Chest Pain;    COMPARISON:  Chest x-ray, 10/23/2018.    FINDINGS:  The lungs are clear. Heart size is top-normal.  No pleural effusion or pneumothorax. The bones are intact.                               The EKG was ordered, reviewed, and independently interpreted by the ED provider.  Interpretation time: 1818  Rate: 89 BPM  Rhythm: normal sinus rhythm  Interpretation: Left anterior fascicular  block. Septal infarct. Possible lateral infarct. No STEMI.             The Emergency Provider reviewed the vital signs and test results, which are outlined above.    ED Discussion     7:56 PM: Pt's troponin is chronically elevated and it is the lowest it has been compared to other troponin results in past, I do not feel symptoms are ACS or PE related, on eliquis.     8:00 PM: Reassessed pt at this time.  Pt states his condition has improved at this time. Pt denies any CP or chest tightness at this time. Discussed with pt all pertinent ED information and results. Discussed pt dx and plan of tx. Gave pt all f/u and return to the ED instructions. All questions and concerns were addressed at this time. Pt expresses understanding of information and instructions, and is comfortable with plan to discharge. Pt is stable for discharge.    I discussed with patient and/or family/caretaker that evaluation in the ED does not suggest any emergent or life threatening medical conditions requiring immediate intervention beyond what was provided in the ED, and I believe patient is safe for discharge.  Regardless, an unremarkable evaluation in the ED does not preclude the development or presence of a serious of life threatening condition. As such, patient was instructed to return immediately for any worsening or change in current symptoms.    Discharge Medication List as of 12/2/2018  8:00 PM      START taking these medications    Details   promethazine-codeine 6.25-10 mg/5 ml (PHENERGAN WITH CODEINE) 6.25-10 mg/5 mL syrup Take 5 mLs by mouth every 4 (four) hours as needed for Cough., Starting Sun 12/2/2018, Until Wed 12/12/2018, Print      azithromycin (Z-HOLDEN) 250 MG tablet Take 1 tablet (250 mg total) by mouth once daily. Take first 2 tablets together, then 1 every day until finished., Starting Sun 12/2/2018, Normal         CONTINUE these medications which have NOT CHANGED    Details   amLODIPine (NORVASC) 10 MG tablet TAKE 1 TABLET  "ONE TIME DAILY, Normal      apixaban (ELIQUIS) 5 mg Tab Take 1 tablet (5 mg total) by mouth 2 (two) times daily., Starting Tue 9/25/2018, Until Mon 12/24/2018, Normal      atorvastatin (LIPITOR) 40 MG tablet TAKE 1 TABLET EVERY EVENING, Normal      baclofen (LIORESAL) 10 MG tablet Take 1 tablet (10 mg total) by mouth once daily., Starting Tue 9/25/2018, Normal      evolocumab (REPATHA SURECLICK) 140 mg/mL PnIj Inject 140 mg into the skin every 14 (fourteen) days., Starting Mon 9/17/2018, Normal      furosemide (LASIX) 40 MG tablet Multiple Dosages:Starting Tue 10/9/2018, Last dose on Wed 10/9/2019Take 2 tablets (80 mg total) by mouth every morning AND 1 tablet (40 mg total) every evening., Normal      gabapentin (NEURONTIN) 600 MG tablet Take 1 tablet (600 mg total) by mouth 3 (three) times daily., Starting Tue 10/9/2018, Normal      glimepiride (AMARYL) 2 MG tablet Take 1 tablet (2 mg total) by mouth before breakfast., Starting Tue 10/9/2018, Normal      insulin glargine (LANTUS SOLOSTAR) 100 unit/mL (3 mL) InPn pen Inject 50 Units into the skin every evening., Starting 1/30/2017, Until Discontinued, No Print      insulin syringe-needle U-100 1 mL 31 gauge x 5/16 Syrg Starting 11/8/2016, Until Discontinued, Historical Med      lisinopril 10 MG tablet Take 1 tablet (10 mg total) by mouth once daily., Starting Thu 9/13/2018, Until Fri 9/13/2019, Normal      metFORMIN (GLUCOPHAGE) 1000 MG tablet Take 1 tablet (1,000 mg total) by mouth 2 (two) times daily with meals., Starting Tue 10/9/2018, Normal      metoprolol succinate (TOPROL-XL) 50 MG 24 hr tablet TAKE 1 TABLET EVERY DAY, Normal      NOVOLOG FLEXPEN 100 unit/mL InPn pen ADMINISTER 20 UNITS UNDER THE SKIN THREE TIMES DAILY WITH MEALS, Normal      pantoprazole (PROTONIX) 40 MG tablet Take 1 tablet (40 mg total) by mouth once daily., Starting Tue 10/9/2018, Normal      pen needle, diabetic (BD INSULIN PEN NEEDLE UF SHORT) 31 gauge x 5/16" Ndle Inject 1 each into " the skin 3 (three) times daily., Starting Wed 6/14/2017, Normal      potassium chloride (K-TAB) 20 mEq Take 1 tablet (20 mEq total) by mouth once daily. for 90 doses, Starting Tue 10/9/2018, Until Mon 1/7/2019, Normal      sertraline (ZOLOFT) 50 MG tablet TAKE 1 TABLET ONE TIME DAILY, Normal      traZODone (DESYREL) 150 MG tablet Take 1 tablet (150 mg total) by mouth nightly., Starting Thu 9/13/2018, Normal      VENTOLIN HFA 90 mcg/actuation inhaler Inhale 2 puffs into the lungs every 4 (four) hours as needed for Wheezing., Starting Tue 9/11/2018, Until Wed 9/11/2019, Normal      ALCOHOL ANTISEPTIC PADS (ALCOHOL PREP PADS TOP) Starting Wed 8/16/2017, Historical Med      ammonium lactate 12 % Crea Apply 1 Act topically 2 (two) times daily., Starting Fri 7/28/2017, Normal      aspirin (ECOTRIN) 81 MG EC tablet Take 1 tablet (81 mg total) by mouth once daily., Starting Fri 11/18/2016, Until Tue 10/23/2018, OTC      exenatide microspheres (BYDUREON BCISE) 2 mg/0.85 mL AtIn Inject 2 mg into the skin every 7 days., Starting Tue 1/9/2018, Until Wed 1/9/2019, Normal      fluticasone-salmeterol 250-50 mcg/dose (ADVAIR DISKUS) 250-50 mcg/dose diskus inhaler Inhale 1 puff into the lungs 2 (two) times daily. Controller, Starting Mon 11/13/2017, Until Tue 11/13/2018, Normal      HYDROcodone-acetaminophen (NORCO)  mg per tablet HYDROCODONE-ACETAMINOPHEN (Norco)  MG ORAL TAB - 1 tab po q 6 hrs prn pain, Normal      isosorbide mononitrate (IMDUR) 60 MG 24 hr tablet Take 1 tablet (60 mg total) by mouth once daily., Starting Thu 9/13/2018, Normal      latanoprost 0.005 % ophthalmic solution Place 1 drop into both eyes every evening., Starting Wed 9/5/2018, Until Thu 9/5/2019, Normal      metOLazone (ZAROXOLYN) 2.5 MG tablet Take 1 tablet (2.5 mg total) by mouth once daily., Starting Thu 9/13/2018, Until Fri 9/13/2019, Normal      nitroGLYCERIN (NITROSTAT) 0.3 MG SL tablet PLACE 1 TABLET UNDER THE TONGUE EVERY 5 MINUTES AS  NEEDED FOR CHEST PAIN, NO MORE THAN 3 TABLETS IN ONE DAY, Normal      ondansetron (ZOFRAN-ODT) 4 MG TbDL Take 1 tablet (4 mg total) by mouth every 6 (six) hours as needed., Starting Tue 10/23/2018, Print      tiotropium (SPIRIVA WITH HANDIHALER) 18 mcg inhalation capsule Inhale 1 capsule (18 mcg total) into the lungs once daily. Controller, Starting Tue 1/31/2017, Until Tue 10/9/2018, Normal      TRUE METRIX AIR GLUCOSE METER kit TEST FOUR TIMES DAILY BEFORE MEALS  AND EVERY NIGHT, Normal               ED Medication(s):  Medications   albuterol-ipratropium 2.5 mg-0.5 mg/3 mL nebulizer solution 3 mL (3 mLs Nebulization Given 12/2/18 1820)       Follow-up Information     Mateo Lambert DO. Schedule an appointment as soon as possible for a visit in 1 day.    Specialty:  Family Medicine  Why:  Return to the Emergency Room, If symptoms worsen  Contact information:  [address] [place] [id number]  [phone]                     Medical Decision Making    Medical Decision Making:   Clinical Tests:   Lab Tests: Ordered and Reviewed  Radiological Study: Ordered and Reviewed  Medical Tests: Ordered and Reviewed           Scribe Attestation:   Scribe #1: I performed the above scribed service and the documentation accurately describes the services I performed. I attest to the accuracy of the note.    Attending:   Physician Attestation Statement for Scribe #1: I, Ewelina Kay MD, personally performed the services described in this documentation, as scribed by Natali Fernandez, in my presence, and it is both accurate and complete.          Clinical Impression       ICD-10-CM ICD-9-CM   1. Bronchitis J40 490   2. Shortness of breath R06.02 786.05   3. Chronic atrial fibrillation I48.2 427.31   4. Chronic anticoagulation Z79.01 V58.61   5. Type 2 diabetes mellitus with hyperglycemia, with long-term current use of insulin E11.65 250.00    Z79.4 790.29     V58.67       Disposition:   Disposition:  Discharged  Condition: Stable         Ewelina Kay MD  12/02/18 7430

## 2018-12-03 NOTE — TELEPHONE ENCOUNTER
----- Message from Kimmie Natarajan sent at 12/3/2018  9:31 AM CST -----  1. What is the name of the medication you are requesting? Hydrocodone  2. What is the dose? 10mg  3. How do you take the medication? Orally, topically, etc? orally  4. How often do you take this medication? Takes twice daily  5. Do you need a 30 day or 90 day supply? 30 day  6. How many refills are you requesting? 17. What is your preferred pharmacy and location of the pharmacy? [facility] in [place], [place]  8. Who can we contact with further questions? [phone]

## 2018-12-03 NOTE — ED NOTES
Patient states he has been increasingly SOB and having a cough x1 week with green mucus when he coughs it up. Patient states he has some upper chest pain when coughing or breathing.     Patient moved to ED room 2, patient assisted onto stretcher and changed into a gown. Patient placed on cardiac monitor, continuous pulse oximetry and automatic blood pressure cuff. Bed placed in low locked position, side rails up x 2, call light is within reach of patient or family, orientation to room and explanation of wait provided to family and patient, alarms set and turned on for monitor and pulse ox, awaiting MD evaluation and orders, will continue to monitor.    Patient identifies self as Crow Green      LOC: The patient is awake, alert and aware of environment with an appropriate affect, the patient is oriented x 3 and speaking appropriately.  APPEARANCE: Patient resting comfortably and in no acute distress, patient is clean and well groomed, patient's clothing is properly fastened.  SKIN: The skin is warm and dry, color consistent with ethnicity, patient has normal skin turgor and moist mucus membranes, skin intact, no breakdown or bruising noted.  MUSCULOSKELETAL: Patient moving all extremities well, no obvious swelling or deformities noted.  RESPIRATORY: Airway is open and patent, respirations are spontaneous, patient has a normal effort and rate, no accessory muscle use noted. Patient has some audible expiratory wheezing.  CARDIAC: Patient has a normal rate and rhythm, no periphreal edema noted, capillary refill < 3 seconds.  ABDOMEN: Soft and non tender to palpation, no distention noted.  NEUROLOGIC: PERRL, eyes open spontaneously, behavior appropriate to situation, follows commands, facial expression symmetrical, bilateral hand grasp equal and even, purposeful motor response noted, normal sensation in all extremities when touched with a finger.

## 2018-12-03 NOTE — ED NOTES
Per Dr. Kay, patient is medically stable to be discharged to [facility] at this time.

## 2018-12-05 NOTE — PROGRESS NOTES
Subjective:     Patient ID: Crow Green is a 63 y.o. male.    Chief Complaint: Routine Foot Care (PCP: HEMANT Lambert 10/23/2018 , diabetic nail care/feet check )    Crow is a 63 y.o. male who presents to the clinic for evaluation and treatment of high risk feet. Crow has a past medical history of Acute kidney injury, Arthritis, Asthma, Atrial fibrillation, CHF (congestive heart failure), Depression, Diabetes mellitus, type 2 (Diagnosed in 2000), Elevated PSA, Hypertension, and Sleep apnea. The patient's chief complaint is long, thick toenails.  This patient has documented high risk feet requiring routine maintenance secondary to diabetes mellitis and those secondary complications of diabetes, as mentioned..    PCP: Mateo Lambert, DO    Date Last Seen by PCP: 10/23/18    Current shoe gear:  Affected Foot: Tennis shoes     Unaffected Foot: Tennis shoes    Hemoglobin A1C   Date Value Ref Range Status   09/13/2018 8.0 (H) 4.0 - 5.6 % Final     Comment:     ADA Screening Guidelines:  5.7-6.4%  Consistent with prediabetes  >or=6.5%  Consistent with diabetes  High levels of fetal hemoglobin interfere with the HbA1C  assay. Heterozygous hemoglobin variants (HbS, HgC, etc)do  not significantly interfere with this assay.   However, presence of multiple variants may affect accuracy.     02/27/2018 10.2 (H) 4.0 - 5.6 % Final     Comment:     According to ADA guidelines, hemoglobin A1c <7.0% represents  optimal control in non-pregnant diabetic patients. Different  metrics may apply to specific patient populations.   Standards of Medical Care in Diabetes-2016.  For the purpose of screening for the presence of diabetes:  <5.7%     Consistent with the absence of diabetes  5.7-6.4%  Consistent with increasing risk for diabetes   (prediabetes)  >or=6.5%  Consistent with diabetes  Currently, no consensus exists for use of hemoglobin A1c  for diagnosis of diabetes for children.  This Hemoglobin A1c assay has significant  interference with fetal   hemoglobin   (HbF). The results are invalid for patients with abnormal amounts of   HbF,   including those with known Hereditary Persistence   of Fetal Hemoglobin. Heterozygous hemoglobin variants (HbAS, HbAC,   HbAD, HbAE, HbA2) do not significantly interfere with this assay;   however, presence of multiple variants in a sample may impact the %   interference.     01/03/2018 10.3 (H) 4.0 - 5.6 % Final     Comment:     According to ADA guidelines, hemoglobin A1c <7.0% represents  optimal control in non-pregnant diabetic patients. Different  metrics may apply to specific patient populations.   Standards of Medical Care in Diabetes-2016.  For the purpose of screening for the presence of diabetes:  <5.7%     Consistent with the absence of diabetes  5.7-6.4%  Consistent with increasing risk for diabetes   (prediabetes)  >or=6.5%  Consistent with diabetes  Currently, no consensus exists for use of hemoglobin A1c  for diagnosis of diabetes for children.  This Hemoglobin A1c assay has significant interference with fetal   hemoglobin   (HbF). The results are invalid for patients with abnormal amounts of   HbF,   including those with known Hereditary Persistence   of Fetal Hemoglobin. Heterozygous hemoglobin variants (HbAS, HbAC,   HbAD, HbAE, HbA2) do not significantly interfere with this assay;   however, presence of multiple variants in a sample may impact the %   interference.             Patient Active Problem List   Diagnosis    ED (erectile dysfunction)    Testicular hypogonadism    Non-proliferative diabetic retinopathy, mild, both eyes    Essential (primary) hypertension    Diabetic polyneuropathy    Coronary artery disease involving native coronary artery without angina pectoris    Chronic systolic congestive heart failure    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, with long-term current use of insulin    SANDRITA on CPAP     Asthma    Psychophysiological insomnia    Nightmares    Sleep related gastroesophageal reflux disease    Inadequate sleep hygiene    PAF (paroxysmal atrial fibrillation)    At risk for medication noncompliance    Obesity (BMI 30.0-34.9)    Indeterminate stage chronic open angle glaucoma    Elevated troponin    Right hip pain    Gait instability    Chronic right shoulder pain    Arthritis    Left sided sciatica    Dyspnea on exertion    Osteoarthritis of spine with radiculopathy, lumbar region    HNP (herniated nucleus pulposus), lumbar    Gastroesophageal reflux disease without esophagitis    Acute renal insufficiency          Medication List           Accurate as of 11/30/18 11:59 PM. If you have any questions, ask your nurse or doctor.               CHANGE how you take these medications    ammonium lactate 12 % Crea  Apply 1 Act topically 2 (two) times daily.  What changed:  when to take this     insulin glargine 100 unit/mL (3 mL) Inpn pen  Commonly known as:  LANTUS SOLOSTAR U-100 INSULIN  Inject 50 Units into the skin every evening.  What changed:  how much to take        CONTINUE taking these medications    ALCOHOL PREP PADS TOP     amLODIPine 10 MG tablet  Commonly known as:  NORVASC  TAKE 1 TABLET ONE TIME DAILY     apixaban 5 mg Tab  Commonly known as:  ELIQUIS  Take 1 tablet (5 mg total) by mouth 2 (two) times daily.     aspirin 81 MG EC tablet  Commonly known as:  ECOTRIN  Take 1 tablet (81 mg total) by mouth once daily.     atorvastatin 40 MG tablet  Commonly known as:  LIPITOR  TAKE 1 TABLET EVERY EVENING     baclofen 10 MG tablet  Commonly known as:  LIORESAL  Take 1 tablet (10 mg total) by mouth once daily.     evolocumab 140 mg/mL Pnij  Commonly known as:  REPATHA SURECLICK  Inject 140 mg into the skin every 14 (fourteen) days.     exenatide microspheres 2 mg/0.85 mL Atin  Commonly known as:  BYDUREON BCISE  Inject 2 mg into the skin every 7 days.     fluticasone-salmeterol 250-50  mcg/dose 250-50 mcg/dose diskus inhaler  Commonly known as:  ADVAIR DISKUS  Inhale 1 puff into the lungs 2 (two) times daily. Controller     furosemide 40 MG tablet  Commonly known as:  LASIX  Take 2 tablets (80 mg total) by mouth every morning AND 1 tablet (40 mg total) every evening.     gabapentin 600 MG tablet  Commonly known as:  NEURONTIN  Take 1 tablet (600 mg total) by mouth 3 (three) times daily.     glimepiride 2 MG tablet  Commonly known as:  AMARYL  Take 1 tablet (2 mg total) by mouth before breakfast.     HYDROcodone-acetaminophen  mg per tablet  Commonly known as:  NORCO  HYDROCODONE-ACETAMINOPHEN (Norco)  MG ORAL TAB - 1 tab po q 6 hrs prn pain     insulin syringe-needle U-100 1 mL 31 gauge x 5/16 Syrg     isosorbide mononitrate 60 MG 24 hr tablet  Commonly known as:  IMDUR  Take 1 tablet (60 mg total) by mouth once daily.     latanoprost 0.005 % ophthalmic solution  Place 1 drop into both eyes every evening.     lisinopril 10 MG tablet  Take 1 tablet (10 mg total) by mouth once daily.     metFORMIN 1000 MG tablet  Commonly known as:  GLUCOPHAGE  Take 1 tablet (1,000 mg total) by mouth 2 (two) times daily with meals.     metOLazone 2.5 MG tablet  Commonly known as:  ZAROXOLYN  Take 1 tablet (2.5 mg total) by mouth once daily.     metoprolol succinate 50 MG 24 hr tablet  Commonly known as:  TOPROL-XL  TAKE 1 TABLET EVERY DAY     nitroGLYCERIN 0.3 MG SL tablet  Commonly known as:  NITROSTAT  PLACE 1 TABLET UNDER THE TONGUE EVERY 5 MINUTES AS NEEDED FOR CHEST PAIN, NO MORE THAN 3 TABLETS IN ONE DAY     NovoLOG Flexpen U-100 Insulin 100 unit/mL Inpn pen  Generic drug:  insulin aspart U-100  ADMINISTER 20 UNITS UNDER THE SKIN THREE TIMES DAILY WITH MEALS     ondansetron 4 MG Tbdl  Commonly known as:  ZOFRAN-ODT  Take 1 tablet (4 mg total) by mouth every 6 (six) hours as needed.     pantoprazole 40 MG tablet  Commonly known as:  PROTONIX  Take 1 tablet (40 mg total) by mouth once daily.     pen  "needle, diabetic 31 gauge x 5/16" Ndle  Commonly known as:  BD ULTRA-FINE SHORT PEN NEEDLE  Inject 1 each into the skin 3 (three) times daily.     potassium chloride 20 mEq  Commonly known as:  K-TAB  Take 1 tablet (20 mEq total) by mouth once daily. for 90 doses     sertraline 50 MG tablet  Commonly known as:  ZOLOFT  TAKE 1 TABLET ONE TIME DAILY     tiotropium 18 mcg inhalation capsule  Commonly known as:  SPIRIVA WITH HANDIHALER  Inhale 1 capsule (18 mcg total) into the lungs once daily. Controller     traZODone 150 MG tablet  Commonly known as:  DESYREL  Take 1 tablet (150 mg total) by mouth nightly.     TRUE METRIX AIR GLUCOSE METER kit  Generic drug:  blood-glucose meter  TEST FOUR TIMES DAILY BEFORE MEALS  AND EVERY NIGHT     VENTOLIN HFA 90 mcg/actuation inhaler  Generic drug:  albuterol  Inhale 2 puffs into the lungs every 4 (four) hours as needed for Wheezing.            Review of patient's allergies indicates:  No Known Allergies    Past Surgical History:   Procedure Laterality Date    BIOPSY-ARTERY-TEMPORAL Left 9/1/2017    Performed [facility]in [facility] at [facility]    CHOLECYSTECTOMY      CHOLECYSTECTOMY-LAPAROSCOPIC N/A 3/17/2017    Performed by Louis O. Jeansonne IV, MD at [facility] OR       Family History   Problem Relation Age of Onset    Glaucoma Mother     Cataracts Mother     Cataracts Father     Glaucoma Sister     Cataracts Sister     Glaucoma Maternal Aunt     No Known Problems Brother     No Known Problems Daughter     No Known Problems Son     Prostate cancer Neg Hx        Social History     Socioeconomic History    Marital status: Legally      Spouse name: Not on file    Number of children: 5    Years of education: Not on file    Highest education level: Not on file   Social Needs    Financial resource strain: Not on file    Food insecurity - worry: Not on file    Food insecurity - inability: Not on file    Transportation needs - medical: Not on file    " "Transportation needs - non-medical: Not on file   Occupational History    Occupation: disabled   Tobacco Use    Smoking status: Never Smoker    Smokeless tobacco: Never Used   Substance and Sexual Activity    Alcohol use: No    Drug use: No    Sexual activity: Not Currently   Other Topics Concern    Not on file   Social History Narrative    Not on file       Vitals:    11/30/18 1152   BP: 138/76   Pulse: 87   Weight: 89.3 kg (196 lb 13.9 oz)   Height: 5' 5" (1.651 m)   PainSc: 0-No pain       Hemoglobin A1C   Date Value Ref Range Status   09/13/2018 8.0 (H) 4.0 - 5.6 % Final     Comment:     ADA Screening Guidelines:  5.7-6.4%  Consistent with prediabetes  >or=6.5%  Consistent with diabetes  High levels of fetal hemoglobin interfere with the HbA1C  assay. Heterozygous hemoglobin variants (HbS, HgC, etc)do  not significantly interfere with this assay.   However, presence of multiple variants may affect accuracy.     02/27/2018 10.2 (H) 4.0 - 5.6 % Final     Comment:     According to ADA guidelines, hemoglobin A1c <7.0% represents  optimal control in non-pregnant diabetic patients. Different  metrics may apply to specific patient populations.   Standards of Medical Care in Diabetes-2016.  For the purpose of screening for the presence of diabetes:  <5.7%     Consistent with the absence of diabetes  5.7-6.4%  Consistent with increasing risk for diabetes   (prediabetes)  >or=6.5%  Consistent with diabetes  Currently, no consensus exists for use of hemoglobin A1c  for diagnosis of diabetes for children.  This Hemoglobin A1c assay has significant interference with fetal   hemoglobin   (HbF). The results are invalid for patients with abnormal amounts of   HbF,   including those with known Hereditary Persistence   of Fetal Hemoglobin. Heterozygous hemoglobin variants (HbAS, HbAC,   HbAD, HbAE, HbA2) do not significantly interfere with this assay;   however, presence of multiple variants in a sample may impact the % "   interference.     01/03/2018 10.3 (H) 4.0 - 5.6 % Final     Comment:     According to ADA guidelines, hemoglobin A1c <7.0% represents  optimal control in non-pregnant diabetic patients. Different  metrics may apply to specific patient populations.   Standards of Medical Care in Diabetes-2016.  For the purpose of screening for the presence of diabetes:  <5.7%     Consistent with the absence of diabetes  5.7-6.4%  Consistent with increasing risk for diabetes   (prediabetes)  >or=6.5%  Consistent with diabetes  Currently, no consensus exists for use of hemoglobin A1c  for diagnosis of diabetes for children.  This Hemoglobin A1c assay has significant interference with fetal   hemoglobin   (HbF). The results are invalid for patients with abnormal amounts of   HbF,   including those with known Hereditary Persistence   of Fetal Hemoglobin. Heterozygous hemoglobin variants (HbAS, HbAC,   HbAD, HbAE, HbA2) do not significantly interfere with this assay;   however, presence of multiple variants in a sample may impact the %   interference.         Review of Systems   Constitutional: Negative for chills and fever.   Respiratory: Negative for shortness of breath.    Cardiovascular: Negative for chest pain, palpitations, orthopnea, claudication and leg swelling.   Gastrointestinal: Negative for diarrhea, nausea and vomiting.   Musculoskeletal: Negative for joint pain.   Skin: Negative for rash.   Neurological: Positive for tingling and sensory change. Negative for dizziness, focal weakness and weakness.   Psychiatric/Behavioral: Negative.          Objective:      PHYSICAL EXAM: Apperance: Alert and orient in no distress,well developed, and with good attention to grooming and body habits  Patient presents ambulating in tennis shoes.   LOWER EXTREMITY EXAM:  VASCULAR: Dorsalis pedis pulses 2/4 bilateral and Posterior Tibial pulses 2/4 bilateral. Capillary fill time <3 seconds bilateral. No edema observed bilateral. Varicosities  absent bilateral. Skin temperature of the lower extremities is warm to warm, proximal to distal. Hair growth dim bilateral.  DERMATOLOGICAL: No skin rashes, subcutaneous nodules, lesions, or ulcers observed bilateral. Nails 1,2,3,4,5 bilateral elongated, thickened, and discolored with subungual debris. Webspaces 1,2,3,4 clean, dry and without evidence of break in skin integrity bilateral. Dry and scaly skin noted plantarly bilateral.   NEUROLOGICAL: Light touch, sharp-dull, proprioception all present and equal bilaterally.  Vibratory sensation diminished at bilateral hallux and navicular. Protective sensation absent at sites as tested with a [place]immie 5.07 monofilament.   MUSCULOSKELETAL: Muscle strength is 5/5 for foot inverters, everters, plantarflexors, and dorsiflexors. Muscle tone is normal.         Assessment:       Encounter Diagnoses   Name Primary?    Type II diabetes mellitus with neurological manifestations Yes    Dermatophytosis of nail          Plan:   Type II diabetes mellitus with neurological manifestations    Dermatophytosis of nail      I counseled the patient on his conditions, regarding findings of my examination, my impressions, and usual treatment plan.   Shoe inspection. Diabetic Foot Education. Patient reminded of the importance of good nutrition and blood sugar control to help prevent podiatric complications of diabetes. Patient instructed on proper foot hygeine. We discussed wearing proper shoe gear, daily foot inspections, never walking without protective shoe gear, never putting sharp instruments to feet.    With patient's permission, nails 1-5 bilateral were debrided/trimmed in length and thickness aggressively to their soft tissue attachment mechanically and with electric , removing all offending nail and debris. Patient relates relief following the procedure.  Patient  will continue to monitor the areas daily, inspect feet, wear protective shoe gear when ambulatory,  moisturizer to maintain skin integrity. Patient reminded of the importance of good nutrition and blood sugar control to help prevent podiatric complications of diabetes.  Patient to return 3 months or sooner if needed.              Barb Veras DPM  [facility]

## 2018-12-10 ENCOUNTER — OFFICE VISIT (OUTPATIENT)
Dept: INTERNAL MEDICINE | Facility: CLINIC | Age: 63
End: 2018-12-10
Payer: MEDICARE

## 2018-12-10 ENCOUNTER — HOSPITAL ENCOUNTER (OUTPATIENT)
Dept: RADIOLOGY | Facility: HOSPITAL | Age: 63
Discharge: HOME | End: 2018-12-10
Attending: FAMILY MEDICINE
Payer: MEDICARE

## 2018-12-10 VITALS
DIASTOLIC BLOOD PRESSURE: 86 MMHG | RESPIRATION RATE: 16 BRPM | HEART RATE: 92 BPM | TEMPERATURE: 97 F | HEIGHT: 65 IN | OXYGEN SATURATION: 98 % | BODY MASS INDEX: 33.13 KG/M2 | WEIGHT: 198.88 LBS | SYSTOLIC BLOOD PRESSURE: 130 MMHG

## 2018-12-10 DIAGNOSIS — M47.26 OSTEOARTHRITIS OF SPINE WITH RADICULOPATHY, LUMBAR REGION: ICD-10-CM

## 2018-12-10 DIAGNOSIS — E11.42 DIABETIC POLYNEUROPATHY ASSOCIATED WITH TYPE 2 DIABETES MELLITUS: ICD-10-CM

## 2018-12-10 DIAGNOSIS — I25.10 CORONARY ARTERY DISEASE INVOLVING NATIVE CORONARY ARTERY OF NATIVE HEART WITHOUT ANGINA PECTORIS: ICD-10-CM

## 2018-12-10 DIAGNOSIS — J40 BRONCHITIS: ICD-10-CM

## 2018-12-10 DIAGNOSIS — M51.26 HNP (HERNIATED NUCLEUS PULPOSUS), LUMBAR: ICD-10-CM

## 2018-12-10 DIAGNOSIS — J40 BRONCHITIS: Primary | ICD-10-CM

## 2018-12-10 PROCEDURE — 94640 AIRWAY INHALATION TREATMENT: CPT | Mod: HCNC,S$GLB,, | Performed by: FAMILY MEDICINE

## 2018-12-10 PROCEDURE — 96372 THER/PROPH/DIAG INJ SC/IM: CPT | Mod: 59,HCNC,S$GLB, | Performed by: FAMILY MEDICINE

## 2018-12-10 PROCEDURE — 3008F BODY MASS INDEX DOCD: CPT | Mod: CPTII,HCNC,S$GLB, | Performed by: FAMILY MEDICINE

## 2018-12-10 PROCEDURE — 99214 OFFICE O/P EST MOD 30 MIN: CPT | Mod: HCNC,25,S$GLB, | Performed by: FAMILY MEDICINE

## 2018-12-10 PROCEDURE — 71046 X-RAY EXAM CHEST 2 VIEWS: CPT | Mod: TC,HCNC,PO

## 2018-12-10 PROCEDURE — 71046 X-RAY EXAM CHEST 2 VIEWS: CPT | Mod: 26,HCNC,, | Performed by: RADIOLOGY

## 2018-12-10 PROCEDURE — 3075F SYST BP GE 130 - 139MM HG: CPT | Mod: CPTII,HCNC,S$GLB, | Performed by: FAMILY MEDICINE

## 2018-12-10 PROCEDURE — 3079F DIAST BP 80-89 MM HG: CPT | Mod: CPTII,HCNC,S$GLB, | Performed by: FAMILY MEDICINE

## 2018-12-10 PROCEDURE — 3045F PR MOST RECENT HEMOGLOBIN A1C LEVEL 7.0-9.0%: CPT | Mod: CPTII,HCNC,S$GLB, | Performed by: FAMILY MEDICINE

## 2018-12-10 PROCEDURE — 99999 PR PBB SHADOW E&M-EST. PATIENT-LVL V: CPT | Mod: PBBFAC,HCNC,, | Performed by: FAMILY MEDICINE

## 2018-12-10 RX ORDER — IPRATROPIUM BROMIDE 0.5 MG/2.5ML
0.5 SOLUTION RESPIRATORY (INHALATION)
Status: COMPLETED | OUTPATIENT
Start: 2018-12-10 | End: 2018-12-10

## 2018-12-10 RX ORDER — BETAMETHASONE SODIUM PHOSPHATE AND BETAMETHASONE ACETATE 3; 3 MG/ML; MG/ML
6 INJECTION, SUSPENSION INTRA-ARTICULAR; INTRALESIONAL; INTRAMUSCULAR; SOFT TISSUE
Status: COMPLETED | OUTPATIENT
Start: 2018-12-10 | End: 2018-12-10

## 2018-12-10 RX ORDER — ALBUTEROL SULFATE 2.5 MG/.5ML
2.5 SOLUTION RESPIRATORY (INHALATION)
Status: COMPLETED | OUTPATIENT
Start: 2018-12-10 | End: 2018-12-10

## 2018-12-10 RX ORDER — GABAPENTIN 600 MG/1
600 TABLET ORAL 3 TIMES DAILY
Qty: 90 TABLET | Refills: 1 | Status: SHIPPED | OUTPATIENT
Start: 2018-12-10 | End: 2018-12-11 | Stop reason: SDUPTHER

## 2018-12-10 RX ORDER — METHYLPREDNISOLONE 4 MG/1
TABLET ORAL
Qty: 1 PACKAGE | Refills: 0 | Status: SHIPPED | OUTPATIENT
Start: 2018-12-10 | End: 2019-01-03

## 2018-12-10 RX ADMIN — BETAMETHASONE SODIUM PHOSPHATE AND BETAMETHASONE ACETATE 6 MG: 3; 3 INJECTION, SUSPENSION INTRA-ARTICULAR; INTRALESIONAL; INTRAMUSCULAR; SOFT TISSUE at 10:12

## 2018-12-10 RX ADMIN — ALBUTEROL SULFATE 2.5 MG: 2.5 SOLUTION RESPIRATORY (INHALATION) at 09:12

## 2018-12-10 RX ADMIN — IPRATROPIUM BROMIDE 0.5 MG: 0.5 SOLUTION RESPIRATORY (INHALATION) at 09:12

## 2018-12-10 NOTE — PROGRESS NOTES
Subjective:       Patient ID: Crow Green is a 63 y.o. male.    Chief Complaint: Follow-up (ED) and Cough    HPI   came in today to follow-up from recent emergency room visit where he was seen 8 days ago for cough and shortness of breath.  Chest x-ray showed no acute process and he was ultimately diagnosed  With bronchitis.  He was given nebulizer treatment in the emergency room and was sent home with antibiotics.  He did not receive any steroid therapy.  He continues to have cough and intermittent shortness of breath.  The cough therapy also was taking has not helped at all.      Family History   Problem Relation Age of Onset    Glaucoma Mother     Cataracts Mother     Cataracts Father     Glaucoma Sister     Cataracts Sister     Glaucoma Maternal Aunt     No Known Problems Brother     No Known Problems Daughter     No Known Problems Son     Prostate cancer Neg Hx        Current Outpatient Medications:     ALCOHOL ANTISEPTIC PADS (ALCOHOL PREP PADS TOP), , Disp: , Rfl:     amLODIPine (NORVASC) 10 MG tablet, TAKE 1 TABLET ONE TIME DAILY, Disp: 90 tablet, Rfl: 3    ammonium lactate 12 % Crea, Apply 1 Act topically 2 (two) times daily. (Patient taking differently: Apply 1 Act topically once daily. ), Disp: 1 Tube, Rfl: 3    apixaban (ELIQUIS) 5 mg Tab, Take 1 tablet (5 mg total) by mouth 2 (two) times daily., Disp: 180 tablet, Rfl: 2    atorvastatin (LIPITOR) 40 MG tablet, TAKE 1 TABLET EVERY EVENING, Disp: 90 tablet, Rfl: 3    baclofen (LIORESAL) 10 MG tablet, Take 1 tablet (10 mg total) by mouth once daily., Disp: 90 tablet, Rfl: 3    furosemide (LASIX) 40 MG tablet, Take 2 tablets (80 mg total) by mouth every morning AND 1 tablet (40 mg total) every evening., Disp: 90 tablet, Rfl: 11    gabapentin (NEURONTIN) 600 MG tablet, Take 1 tablet (600 mg total) by mouth 3 (three) times daily., Disp: 90 tablet, Rfl: 1    glimepiride (AMARYL) 2 MG tablet, Take 1 tablet (2 mg total) by mouth before  "breakfast., Disp: 90 tablet, Rfl: 3    HYDROcodone-acetaminophen (NORCO)  mg per tablet, HYDROCODONE-ACETAMINOPHEN (Norco)  MG ORAL TAB - 1 tab po q 6 hrs prn pain, Disp: 28 tablet, Rfl: 0    insulin glargine (LANTUS SOLOSTAR) 100 unit/mL (3 mL) InPn pen, Inject 50 Units into the skin every evening. (Patient taking differently: Inject 70 Units into the skin every evening. ), Disp: 2 Box, Rfl: 11    insulin syringe-needle U-100 1 mL 31 gauge x 5/16 Syrg, , Disp: , Rfl:     isosorbide mononitrate (IMDUR) 60 MG 24 hr tablet, Take 1 tablet (60 mg total) by mouth once daily., Disp: 90 tablet, Rfl: 3    latanoprost 0.005 % ophthalmic solution, Place 1 drop into both eyes every evening., Disp: 1 Bottle, Rfl: 4    lisinopril 10 MG tablet, Take 1 tablet (10 mg total) by mouth once daily., Disp: 90 tablet, Rfl: 3    metFORMIN (GLUCOPHAGE) 1000 MG tablet, Take 1 tablet (1,000 mg total) by mouth 2 (two) times daily with meals., Disp: 180 tablet, Rfl: 3    metoprolol succinate (TOPROL-XL) 50 MG 24 hr tablet, TAKE 1 TABLET EVERY DAY, Disp: 90 tablet, Rfl: 5    nitroGLYCERIN (NITROSTAT) 0.3 MG SL tablet, PLACE 1 TABLET UNDER THE TONGUE EVERY 5 MINUTES AS NEEDED FOR CHEST PAIN, NO MORE THAN 3 TABLETS IN ONE DAY, Disp: 100 tablet, Rfl: 0    NOVOLOG FLEXPEN 100 unit/mL InPn pen, ADMINISTER 20 UNITS UNDER THE SKIN THREE TIMES DAILY WITH MEALS, Disp: 15 mL, Rfl: 0    ondansetron (ZOFRAN-ODT) 4 MG TbDL, Take 1 tablet (4 mg total) by mouth every 6 (six) hours as needed., Disp: 6 tablet, Rfl: 1    pantoprazole (PROTONIX) 40 MG tablet, Take 1 tablet (40 mg total) by mouth once daily., Disp: 30 tablet, Rfl: 11    pen needle, diabetic (BD INSULIN PEN NEEDLE UF SHORT) 31 gauge x 5/16" Ndle, Inject 1 each into the skin 3 (three) times daily., Disp: 90 each, Rfl: 11    potassium chloride (K-TAB) 20 mEq, Take 1 tablet (20 mEq total) by mouth once daily. for 90 doses, Disp: 90 tablet, Rfl: 3    sertraline (ZOLOFT) 50 MG " tablet, TAKE 1 TABLET ONE TIME DAILY, Disp: 90 tablet, Rfl: 3    traZODone (DESYREL) 150 MG tablet, Take 1 tablet (150 mg total) by mouth nightly., Disp: 90 tablet, Rfl: 1    TRUE METRIX AIR GLUCOSE METER kit, TEST FOUR TIMES DAILY BEFORE MEALS  AND EVERY NIGHT, Disp: 1 each, Rfl: 0    VENTOLIN HFA 90 mcg/actuation inhaler, Inhale 2 puffs into the lungs every 4 (four) hours as needed for Wheezing., Disp: 3 Inhaler, Rfl: 4    aspirin (ECOTRIN) 81 MG EC tablet, Take 1 tablet (81 mg total) by mouth once daily., Disp: , Rfl: 0    evolocumab (REPATHA SURECLICK) 140 mg/mL PnIj, Inject 140 mg into the skin every 14 (fourteen) days., Disp: 11 mL, Rfl: 3    exenatide microspheres (BYDUREON BCISE) 2 mg/0.85 mL AtIn, Inject 2 mg into the skin every 7 days., Disp: 4 Syringe, Rfl: 11    fluticasone-salmeterol 250-50 mcg/dose (ADVAIR DISKUS) 250-50 mcg/dose diskus inhaler, Inhale 1 puff into the lungs 2 (two) times daily. Controller, Disp: 60 each, Rfl: 11    inhalation spacing device, Use as directed for inhalation., Disp: 1 Device, Rfl: 0    inhalation spacing device, Use as directed for inhalation., Disp: 1 Device, Rfl: 0    ipratropium-albuterol (COMBIVENT)  mcg/actuation inhaler, Inhale 1 puff into the lungs 4 (four) times daily. Rescue, Disp: 4 g, Rfl: 0    methylPREDNISolone (MEDROL DOSEPACK) 4 mg tablet, use as directed. Start on Tuesday, Disp: 1 Package, Rfl: 0    metOLazone (ZAROXOLYN) 2.5 MG tablet, Take 1 tablet (2.5 mg total) by mouth once daily., Disp: 90 tablet, Rfl: 3    promethazine-codeine 6.25-10 mg/5 ml (PHENERGAN WITH CODEINE) 6.25-10 mg/5 mL syrup, Take 5 mLs by mouth every 4 (four) hours as needed for Cough., Disp: 118 mL, Rfl: 0    tiotropium (SPIRIVA WITH HANDIHALER) 18 mcg inhalation capsule, Inhale 1 capsule (18 mcg total) into the lungs once daily. Controller, Disp: 90 capsule, Rfl: 3  No current facility-administered medications for this visit.     Review of Systems  "  Constitutional: Positive for diaphoresis. Negative for chills and fever.   Eyes: Negative for visual disturbance.   Respiratory: Positive for cough, shortness of breath and wheezing.    Cardiovascular: Negative for chest pain.   Gastrointestinal: Negative for abdominal pain.   Neurological: Negative for dizziness.       Objective:   /86 (BP Location: Left arm, Patient Position: Sitting, BP Method: Medium (Automatic))   Pulse 92   Temp 97.3 °F (36.3 °C)   Resp 16   Ht 5' 5" (1.651 m)   Wt 90.2 kg (198 lb 13.7 oz)   SpO2 98%   BMI 33.09 kg/m²      Physical Exam   Constitutional: He is oriented to person, place, and time. He appears well-developed and well-nourished. No distress.   HENT:   Head: Normocephalic and atraumatic.   Nose: Nose normal.   Eyes: Conjunctivae and EOM are normal. Pupils are equal, round, and reactive to light. Right eye exhibits no discharge. Left eye exhibits no discharge.   Neck: No thyromegaly present.   Cardiovascular: Normal rate, regular rhythm and normal heart sounds.   No murmur heard.  Pulmonary/Chest: Effort normal. No stridor. No respiratory distress. He has wheezes ( with rhonchi heard throughout lung fields.).   Frequent coughing during exam.  Did not have much improvement on his lung exam after 1 nebulizer with Atrovent and albuterol   Abdominal: Soft. He exhibits no distension.   Musculoskeletal: He exhibits no edema.   Neurological: He is alert and oriented to person, place, and time.   Skin: Skin is warm. No rash noted. He is not diaphoretic.   Psychiatric: He has a normal mood and affect. His behavior is normal.   Vitals reviewed.      Assessment & Plan     Problem List Items Addressed This Visit        Neuro    Diabetic polyneuropathy    Relevant Orders    Microalbumin/creatinine urine ratio    Comprehensive metabolic panel (Completed)    Hemoglobin A1c    Osteoarthritis of spine with radiculopathy, lumbar region    Relevant Medications    gabapentin (NEURONTIN) " 600 MG tablet    HNP (herniated nucleus pulposus), lumbar    Relevant Medications    gabapentin (NEURONTIN) 600 MG tablet       Pulmonary    Bronchitis - Primary    Current Assessment & Plan      To not have much improvement with nebulizer treatment.  It is concerning that he has diabetes and a history of CHF with the symptoms.  Chest x-ray interpreted by me prior to radiologist read it was not concerning for pneumonia.  I recommended an intramuscular steroid injection and a Medrol Dosepak.  I warned him that his glucose will likely need addressing with additional NovoLog and possibly extra basal insulin to compensate for the steroid dosing.  Also recommended Combivent.  No need for antibiotics at this time.  No signs of CHF decompensation.         Relevant Orders    X-Ray Chest PA And Lateral (Completed)       Cardiac/Vascular    Coronary artery disease involving native coronary artery without angina pectoris    Current Assessment & Plan       No anginal chest pain and no shortness of breath with exertion.  His shortness of breath is associated with intermittent coughing spells                 No Follow-up on file.

## 2018-12-10 NOTE — ASSESSMENT & PLAN NOTE
To not have much improvement with nebulizer treatment.  It is concerning that he has diabetes and a history of CHF with the symptoms.  Chest x-ray interpreted by me prior to radiologist read it was not concerning for pneumonia.  I recommended an intramuscular steroid injection and a Medrol Dosepak.  I warned him that his glucose will likely need addressing with additional NovoLog and possibly extra basal insulin to compensate for the steroid dosing.  Also recommended Combivent.  No need for antibiotics at this time.  No signs of CHF decompensation.

## 2018-12-10 NOTE — ASSESSMENT & PLAN NOTE
No anginal chest pain and no shortness of breath with exertion.  His shortness of breath is associated with intermittent coughing spells

## 2018-12-11 ENCOUNTER — TELEPHONE (OUTPATIENT)
Dept: INTERNAL MEDICINE | Facility: CLINIC | Age: 63
End: 2018-12-11

## 2018-12-11 DIAGNOSIS — M47.26 OSTEOARTHRITIS OF SPINE WITH RADICULOPATHY, LUMBAR REGION: ICD-10-CM

## 2018-12-11 DIAGNOSIS — M51.26 HNP (HERNIATED NUCLEUS PULPOSUS), LUMBAR: ICD-10-CM

## 2018-12-11 RX ORDER — GABAPENTIN 600 MG/1
600 TABLET ORAL 3 TIMES DAILY
Qty: 90 TABLET | Refills: 1 | Status: SHIPPED | OUTPATIENT
Start: 2018-12-11 | End: 2019-01-23 | Stop reason: SDUPTHER

## 2018-12-11 RX ORDER — PROMETHAZINE HYDROCHLORIDE AND DEXTROMETHORPHAN HYDROBROMIDE 6.25; 15 MG/5ML; MG/5ML
5 SYRUP ORAL
Qty: 240 ML | Refills: 0 | Status: SHIPPED | OUTPATIENT
Start: 2018-12-11 | End: 2018-12-21

## 2018-12-11 NOTE — TELEPHONE ENCOUNTER
----- Message from Shamika Mckeon sent at 12/11/2018  4:21 PM CST -----  Contact: home health nurse-Awilda  She's calling in regards to Medication, pls call pt back at [phone]

## 2018-12-11 NOTE — TELEPHONE ENCOUNTER
He had reported to me yesterday that cough syrup was not effective. Was he able to get the combivent?

## 2018-12-11 NOTE — TELEPHONE ENCOUNTER
----- Message from Kimmie Natarajan sent at 12/11/2018 11:32 AM CST -----  Pt at [phone]//states he had an appt with Dr Lambert yesterday//12-10-18//was prescribed medications//the pharmacy did not received the cough syrup script/he did get the steroid med and they had to order the other med//(pt not sure the name of med)but it is on back order//uses/[facility] in [place]//please call//cassandra//lo

## 2018-12-11 NOTE — TELEPHONE ENCOUNTER
Appt scheduled and confirmed, pt request a rx for cough. Last rx was given on 12/2/18 at [facility].  Home Health nurse Awilda stated pt is out of Gabapentin and rx was sent to [facility]. Per [facility] have doctor send short Rx to local pharmacy for 7 days.  Please advise

## 2018-12-12 ENCOUNTER — TELEPHONE (OUTPATIENT)
Dept: INTERNAL MEDICINE | Facility: CLINIC | Age: 63
End: 2018-12-12

## 2018-12-12 NOTE — TELEPHONE ENCOUNTER
----- Message from Jean Christie sent at 12/12/2018  9:05 AM CST -----  Contact: self [phone]  Would like to consult with nurse regarding issue with persistent cough. Please call back at [phone].  Md Jocelyn

## 2018-12-12 NOTE — TELEPHONE ENCOUNTER
Pt call returned, pt stated he feels as if he's not getting any better. Pt request cough medication. Pt informed medication was sent to [facility] pharmacy 12/11/18. Pt voiced understanding

## 2018-12-13 ENCOUNTER — TELEPHONE (OUTPATIENT)
Dept: INTERNAL MEDICINE | Facility: CLINIC | Age: 63
End: 2018-12-13

## 2018-12-13 NOTE — TELEPHONE ENCOUNTER
Nurse advised pt rx for cough medication was sent to pharmacy on 12/12/18. Pt was informed on 12/12/18 of medication sent. Pt stated that's not the one he wanted. Nurse advised pt to take prescribed rx and that bronchitis takes time to rub its course. Continue to take medrol dose pack along with breathing tx and rest. Pt voiced understanding.

## 2018-12-13 NOTE — TELEPHONE ENCOUNTER
----- Message from Awilda Jamison sent at 12/13/2018  8:37 AM CST -----  Contact: self [phone]  States that is already taking the Robitussin cough medication and would like a cough medication with codeine called in for him. Pt uses     [id number] - [facility] [place] - [address] AT [place] & [facility]  [address] [place] [address]  Phone: [phone] Fax: [phone]    Please call back at [phone]//thank you acc

## 2018-12-26 ENCOUNTER — NURSE TRIAGE (OUTPATIENT)
Dept: ADMINISTRATIVE | Facility: CLINIC | Age: 63
End: 2018-12-26

## 2018-12-26 ENCOUNTER — TELEPHONE (OUTPATIENT)
Dept: CARDIOLOGY | Facility: CLINIC | Age: 63
End: 2018-12-26

## 2018-12-26 NOTE — TELEPHONE ENCOUNTER
Spoke with pt who stated he was not going to the ER.  Pt stated he wanted an appointment tomorrow.  An appointment was made.

## 2018-12-26 NOTE — TELEPHONE ENCOUNTER
"    Reason for Disposition   Chest pain   Chest pain lasting longer than 5 minutes and ANY of the following:* Over 50 years old* Over 30 years old and at least one cardiac risk factor (i.e., high blood pressure, diabetes, high cholesterol, obesity, smoker or strong family history of heart disease)* Pain is crushing, pressure-like, or heavy * Took nitroglycerin and chest pain was not relieved* History of heart disease (i.e., angina, heart attack, bypass surgery, angioplasty, CHF)    Protocols used: ST BREATHING DIFFICULTY-A-OH, ST CHEST PAIN-A-OH    Crow's call was transferred from [facility] in [facility].  She said he is trying to get appt with Dr Zhao, his cardiologist, but he reports chest pain to her.  In speaking with Crow on triage line, he states he has been short of breath, with cough, for "about 3 weeks."  He is audibly "huffing" with each breath he takes during this call and RR elevated. He also reports chest pain that "comes and goes."  It lasts longer than 5 minutes when it comes, last episode was one hour ago.  CP coming more frequently.  He does have history of CAD, CHF, DM, and asthma.  Per [facility] triage protocol, recommended he call 911 now.  He states he will do so.  Message to Dr Zhao, cardiology, and Eri Armstrong NP in pulmonology.  Please contact caller directly with any additional care advice.  .  "

## 2018-12-28 ENCOUNTER — TELEPHONE (OUTPATIENT)
Dept: PULMONOLOGY | Facility: CLINIC | Age: 63
End: 2018-12-28

## 2018-12-28 NOTE — TELEPHONE ENCOUNTER
"Called and updated with patient. States he is doing better, feeling "okay".  States he has a dry cough, that's about it. Scheduled a f/u appointment with AILEEN Armstrong at [facility] on 1/29/19 at 10:40. Appointment letter mailed.  "

## 2018-12-31 ENCOUNTER — TELEPHONE (OUTPATIENT)
Dept: ADMINISTRATIVE | Facility: CLINIC | Age: 63
End: 2018-12-31

## 2018-12-31 NOTE — TELEPHONE ENCOUNTER
Home Health Recert 12/23/2018 - 02/20/2019 with [facility] ([facility]) - Dr. Mateo Lambert. Patient received SN services.

## 2019-01-02 ENCOUNTER — TELEPHONE (OUTPATIENT)
Dept: INTERNAL MEDICINE | Facility: CLINIC | Age: 64
End: 2019-01-02

## 2019-01-02 RX ORDER — HYDROCODONE BITARTRATE AND ACETAMINOPHEN 10; 325 MG/1; MG/1
TABLET ORAL
Qty: 28 TABLET | Refills: 0 | Status: SHIPPED | OUTPATIENT
Start: 2019-01-02 | End: 2019-01-29 | Stop reason: SDUPTHER

## 2019-01-02 NOTE — TELEPHONE ENCOUNTER
----- Message from Mark Sims sent at 1/2/2019  2:24 PM CST -----  Contact: pt   Pt would like to get refill on       Hydrocodeine/ 10 mg / 30 days  / tablets    Pt would like cb.          [phone]          ..      [id number] - [facility] [place] - [address] AT [place] & [facility]  [address] [address]  Phone: [phone] Fax: [phone]

## 2019-01-02 NOTE — TELEPHONE ENCOUNTER
Notified patient prescription was faxed to his pharmacy, but may need hard copy. Patient states he has an appointment tomorrow anyway.

## 2019-01-02 NOTE — TELEPHONE ENCOUNTER
----- Message from Sarahy Ochoa sent at 1/2/2019  9:39 AM CST -----  Contact: pt  Pt request call back when refill sent   1. What is the name of the medication you are requesting? HYDROcodone-acetaminophen (NORCO)   2. What is the dose?  mg per tablet   3. How do you take the medication? Orally, topically, etc? Orally   4. How often do you take this medication?   5. Do you need a 30 day or 90 day supply?    6. How many refills are you requesting?    7. What is your preferred pharmacy and location of the pharmacy?   [id number] - [facility] [place] - [address] AT [place] & [facility]  [address] [place] [address]  Phone: [phone] Fax: [phone]  8. Who can we contact with further questions? [phone] (work)

## 2019-01-03 ENCOUNTER — HOSPITAL ENCOUNTER (OUTPATIENT)
Dept: CARDIOLOGY | Facility: CLINIC | Age: 64
Discharge: HOME | End: 2019-01-03
Payer: MEDICARE

## 2019-01-03 ENCOUNTER — OFFICE VISIT (OUTPATIENT)
Dept: INTERNAL MEDICINE | Facility: CLINIC | Age: 64
End: 2019-01-03
Payer: MEDICARE

## 2019-01-03 ENCOUNTER — CLINICAL SUPPORT (OUTPATIENT)
Dept: DIABETES | Facility: CLINIC | Age: 64
End: 2019-01-03
Payer: MEDICARE

## 2019-01-03 ENCOUNTER — OFFICE VISIT (OUTPATIENT)
Dept: DIABETES | Facility: CLINIC | Age: 64
End: 2019-01-03
Payer: MEDICARE

## 2019-01-03 VITALS
SYSTOLIC BLOOD PRESSURE: 108 MMHG | DIASTOLIC BLOOD PRESSURE: 58 MMHG | WEIGHT: 197.56 LBS | HEART RATE: 83 BPM | HEIGHT: 65 IN | BODY MASS INDEX: 32.91 KG/M2 | TEMPERATURE: 99 F | RESPIRATION RATE: 19 BRPM | OXYGEN SATURATION: 98 %

## 2019-01-03 VITALS
DIASTOLIC BLOOD PRESSURE: 68 MMHG | BODY MASS INDEX: 33.06 KG/M2 | WEIGHT: 198.44 LBS | HEIGHT: 65 IN | SYSTOLIC BLOOD PRESSURE: 116 MMHG

## 2019-01-03 DIAGNOSIS — E11.65 UNCONTROLLED TYPE 2 DIABETES MELLITUS WITH HYPERGLYCEMIA: Primary | ICD-10-CM

## 2019-01-03 DIAGNOSIS — R07.9 CHEST PAIN, UNSPECIFIED TYPE: ICD-10-CM

## 2019-01-03 DIAGNOSIS — I25.118 CORONARY ARTERY DISEASE OF NATIVE ARTERY OF NATIVE HEART WITH STABLE ANGINA PECTORIS: ICD-10-CM

## 2019-01-03 DIAGNOSIS — E11.65 UNCONTROLLED TYPE 2 DIABETES MELLITUS WITH HYPERGLYCEMIA: ICD-10-CM

## 2019-01-03 DIAGNOSIS — R05.9 COUGH: Primary | ICD-10-CM

## 2019-01-03 DIAGNOSIS — R09.82 PND (POST-NASAL DRIP): ICD-10-CM

## 2019-01-03 LAB — GLUCOSE SERPL-MCNC: 113 MG/DL (ref 70–110)

## 2019-01-03 PROCEDURE — 3078F DIAST BP <80 MM HG: CPT | Mod: CPTII,HCNC,S$GLB, | Performed by: NURSE PRACTITIONER

## 2019-01-03 PROCEDURE — 93010 ELECTROCARDIOGRAM REPORT: CPT | Mod: HCNC,S$GLB,, | Performed by: INTERNAL MEDICINE

## 2019-01-03 PROCEDURE — 3078F DIAST BP <80 MM HG: CPT | Mod: CPTII,HCNC,S$GLB, | Performed by: PHYSICIAN ASSISTANT

## 2019-01-03 PROCEDURE — 99214 OFFICE O/P EST MOD 30 MIN: CPT | Mod: HCNC,S$GLB,, | Performed by: NURSE PRACTITIONER

## 2019-01-03 PROCEDURE — 3074F SYST BP LT 130 MM HG: CPT | Mod: CPTII,HCNC,S$GLB, | Performed by: PHYSICIAN ASSISTANT

## 2019-01-03 PROCEDURE — 3074F SYST BP LT 130 MM HG: CPT | Mod: CPTII,HCNC,S$GLB, | Performed by: NURSE PRACTITIONER

## 2019-01-03 PROCEDURE — 93005 EKG 12-LEAD: ICD-10-PCS | Mod: HCNC,S$GLB,, | Performed by: NURSE PRACTITIONER

## 2019-01-03 PROCEDURE — 82962 POCT GLUCOSE, HAND-HELD DEVICE: ICD-10-PCS | Mod: HCNC,S$GLB,, | Performed by: PHYSICIAN ASSISTANT

## 2019-01-03 PROCEDURE — 99999 PR PBB SHADOW E&M-EST. PATIENT-LVL IV: ICD-10-PCS | Mod: PBBFAC,HCNC,, | Performed by: PHYSICIAN ASSISTANT

## 2019-01-03 PROCEDURE — 3045F PR MOST RECENT HEMOGLOBIN A1C LEVEL 7.0-9.0%: ICD-10-PCS | Mod: CPTII,HCNC,S$GLB, | Performed by: PHYSICIAN ASSISTANT

## 2019-01-03 PROCEDURE — 93005 ELECTROCARDIOGRAM TRACING: CPT | Mod: HCNC,S$GLB,, | Performed by: NURSE PRACTITIONER

## 2019-01-03 PROCEDURE — 3045F PR MOST RECENT HEMOGLOBIN A1C LEVEL 7.0-9.0%: CPT | Mod: CPTII,HCNC,S$GLB, | Performed by: PHYSICIAN ASSISTANT

## 2019-01-03 PROCEDURE — 3008F PR BODY MASS INDEX (BMI) DOCUMENTED: ICD-10-PCS | Mod: CPTII,HCNC,S$GLB, | Performed by: NURSE PRACTITIONER

## 2019-01-03 PROCEDURE — 3074F PR MOST RECENT SYSTOLIC BLOOD PRESSURE < 130 MM HG: ICD-10-PCS | Mod: CPTII,HCNC,S$GLB, | Performed by: PHYSICIAN ASSISTANT

## 2019-01-03 PROCEDURE — 3008F BODY MASS INDEX DOCD: CPT | Mod: CPTII,HCNC,S$GLB, | Performed by: NURSE PRACTITIONER

## 2019-01-03 PROCEDURE — 3074F PR MOST RECENT SYSTOLIC BLOOD PRESSURE < 130 MM HG: ICD-10-PCS | Mod: CPTII,HCNC,S$GLB, | Performed by: NURSE PRACTITIONER

## 2019-01-03 PROCEDURE — 99214 PR OFFICE/OUTPT VISIT, EST, LEVL IV, 30-39 MIN: ICD-10-PCS | Mod: HCNC,S$GLB,, | Performed by: PHYSICIAN ASSISTANT

## 2019-01-03 PROCEDURE — 3008F PR BODY MASS INDEX (BMI) DOCUMENTED: ICD-10-PCS | Mod: CPTII,HCNC,S$GLB, | Performed by: PHYSICIAN ASSISTANT

## 2019-01-03 PROCEDURE — 99999 PR PBB SHADOW E&M-EST. PATIENT-LVL V: CPT | Mod: PBBFAC,HCNC,, | Performed by: NURSE PRACTITIONER

## 2019-01-03 PROCEDURE — 3078F PR MOST RECENT DIASTOLIC BLOOD PRESSURE < 80 MM HG: ICD-10-PCS | Mod: CPTII,HCNC,S$GLB, | Performed by: NURSE PRACTITIONER

## 2019-01-03 PROCEDURE — 82962 GLUCOSE BLOOD TEST: CPT | Mod: HCNC,S$GLB,, | Performed by: PHYSICIAN ASSISTANT

## 2019-01-03 PROCEDURE — 99214 OFFICE O/P EST MOD 30 MIN: CPT | Mod: HCNC,S$GLB,, | Performed by: PHYSICIAN ASSISTANT

## 2019-01-03 PROCEDURE — 3078F PR MOST RECENT DIASTOLIC BLOOD PRESSURE < 80 MM HG: ICD-10-PCS | Mod: CPTII,HCNC,S$GLB, | Performed by: PHYSICIAN ASSISTANT

## 2019-01-03 PROCEDURE — 99999 PR PBB SHADOW E&M-EST. PATIENT-LVL IV: CPT | Mod: PBBFAC,HCNC,, | Performed by: PHYSICIAN ASSISTANT

## 2019-01-03 PROCEDURE — 99214 PR OFFICE/OUTPT VISIT, EST, LEVL IV, 30-39 MIN: ICD-10-PCS | Mod: HCNC,S$GLB,, | Performed by: NURSE PRACTITIONER

## 2019-01-03 PROCEDURE — 3008F BODY MASS INDEX DOCD: CPT | Mod: CPTII,HCNC,S$GLB, | Performed by: PHYSICIAN ASSISTANT

## 2019-01-03 PROCEDURE — 99999 PR PBB SHADOW E&M-EST. PATIENT-LVL V: ICD-10-PCS | Mod: PBBFAC,HCNC,, | Performed by: NURSE PRACTITIONER

## 2019-01-03 PROCEDURE — 93010 EKG 12-LEAD: ICD-10-PCS | Mod: HCNC,S$GLB,, | Performed by: INTERNAL MEDICINE

## 2019-01-03 RX ORDER — LEVOCETIRIZINE DIHYDROCHLORIDE 5 MG/1
5 TABLET, FILM COATED ORAL NIGHTLY
Qty: 30 TABLET | Refills: 11 | Status: SHIPPED | OUTPATIENT
Start: 2019-01-03 | End: 2019-02-21

## 2019-01-03 RX ORDER — BENZONATATE 100 MG/1
200 CAPSULE ORAL 3 TIMES DAILY PRN
Qty: 50 CAPSULE | Refills: 1 | Status: SHIPPED | OUTPATIENT
Start: 2019-01-03 | End: 2019-01-13

## 2019-01-03 RX ORDER — APIXABAN 5 MG/1
5 TABLET, FILM COATED ORAL 2 TIMES DAILY
COMMUNITY
Start: 2018-12-27 | End: 2020-03-23 | Stop reason: SDUPTHER

## 2019-01-03 NOTE — PROGRESS NOTES
"Patient is here in clinic today for placement of continuous glucose monitoring system. Patient was provided a Freestyle Pilar sensor and a copy of the Continuous Glucose Monitoring Patient Log to to fill out during the study.      A detailed explanation of CGMS was provided. Patient informed that this is a blind procedure and they will not actually see the blood sugar tracing in real time. Reviewed with the patient the Freestyle Pilar Pro Sensor education handout, "What you need to know"  to review self-care during the study to avoid sensor loosening or removal ie...bathing, swimming, dressing, and exercising.      Instructed patient to check blood sugar using home glucometer and to record the following on provided patient log sheets: blood sugar taken at home, meals and snacks, activity, and diabetes medications taken and dosage         Sensor was inserted on back of left upper arm.   Follow up appointment was given to patient  "

## 2019-01-03 NOTE — PROGRESS NOTES
Subjective:      Patient ID: Crow Green is a 63 y.o. male.    PCP: Mateo Lambert DO      Crow Green is a pleasant 63 y.o. male presenting to follow up on diabetes mellitus. He has had diabetes for 18 or more years. His last visit in Diabetes Management was 2/27/2018. Since that time he has been managed by his PCP and was stable until approximately 2 months ago. He had started having low blood sugars and over the past 2 weeks he has not taken any of his medication except metformin because of hypoglycemia. His blood sugar range fasting has been  and he has been monitoring 1 times per day. His current concerns are glycemic control, chronic pain and recent bronchitis.     He denies any hospital admissions, emergency room visits, positive for hypoglycemia, denies syncope, diaphoresis, positive chest pain, and dyspnea since bronchitis.    He has maintained 198 pounds since last visit. His BMI is 33.02 kg/m².    His blood sugar in the clinic today was:   Lab Results   Component Value Date    POCGLU 113 (A) 01/03/2019       We discussed the American diabetes Association recommendations:  hemoglobin A1c below 7.0%; all diabetics should be on statins unless contraindicated; one aspirin daily unless contraindicated; fasting blood sugar between 80 and 130 mg/dL; postprandial blood sugar below 180 mg/dl; prevention of hypoglycemia, may adjust goals to higher levels if persistent; ACE or ARB therapy if not contraindicated; and maintain in an ideal body weight with BMI below 25.    Crow is noncompliant some of the time with DM medications.     Crow is noncompliant much of the time with lifestyle modifications to include activity and meal planning.       Current Outpatient Medications:     ALCOHOL ANTISEPTIC PADS (ALCOHOL PREP PADS TOP), , Disp: , Rfl:     amLODIPine (NORVASC) 10 MG tablet, TAKE 1 TABLET ONE TIME DAILY, Disp: 90 tablet, Rfl: 3    ammonium lactate 12 % Crea, Apply 1 Act topically  2 (two) times daily. (Patient taking differently: Apply 1 Act topically once daily. ), Disp: 1 Tube, Rfl: 3    aspirin (ECOTRIN) 81 MG EC tablet, Take 1 tablet (81 mg total) by mouth once daily., Disp: , Rfl: 0    atorvastatin (LIPITOR) 40 MG tablet, TAKE 1 TABLET EVERY EVENING, Disp: 90 tablet, Rfl: 3    baclofen (LIORESAL) 10 MG tablet, Take 1 tablet (10 mg total) by mouth once daily., Disp: 90 tablet, Rfl: 3    fluticasone-salmeterol 250-50 mcg/dose (ADVAIR DISKUS) 250-50 mcg/dose diskus inhaler, Inhale 1 puff into the lungs 2 (two) times daily. Controller, Disp: 60 each, Rfl: 11    furosemide (LASIX) 40 MG tablet, Take 2 tablets (80 mg total) by mouth every morning AND 1 tablet (40 mg total) every evening., Disp: 90 tablet, Rfl: 11    gabapentin (NEURONTIN) 600 MG tablet, Take 1 tablet (600 mg total) by mouth 3 (three) times daily., Disp: 90 tablet, Rfl: 1    glimepiride (AMARYL) 2 MG tablet, Take 1 tablet (2 mg total) by mouth before breakfast., Disp: 90 tablet, Rfl: 3    HYDROcodone-acetaminophen (NORCO)  mg per tablet, HYDROCODONE-ACETAMINOPHEN (Norco)  MG ORAL TAB - 1 tab po q 6 hrs prn pain, Disp: 28 tablet, Rfl: 0    inhalation spacing device, Use as directed for inhalation., Disp: 1 Device, Rfl: 0    inhalation spacing device, Use as directed for inhalation., Disp: 1 Device, Rfl: 0    insulin glargine (LANTUS SOLOSTAR) 100 unit/mL (3 mL) InPn pen, Inject 50 Units into the skin every evening. (Patient taking differently: Inject 70 Units into the skin every evening. ), Disp: 2 Box, Rfl: 11    insulin syringe-needle U-100 1 mL 31 gauge x 5/16 Syrg, , Disp: , Rfl:     ipratropium-albuterol (COMBIVENT)  mcg/actuation inhaler, Inhale 1 puff into the lungs 4 (four) times daily. Rescue, Disp: 4 g, Rfl: 0    isosorbide mononitrate (IMDUR) 60 MG 24 hr tablet, Take 1 tablet (60 mg total) by mouth once daily., Disp: 90 tablet, Rfl: 3    latanoprost 0.005 % ophthalmic solution, Place 1  "drop into both eyes every evening., Disp: 1 Bottle, Rfl: 4    lisinopril 10 MG tablet, Take 1 tablet (10 mg total) by mouth once daily., Disp: 90 tablet, Rfl: 3    metFORMIN (GLUCOPHAGE) 1000 MG tablet, Take 1 tablet (1,000 mg total) by mouth 2 (two) times daily with meals., Disp: 180 tablet, Rfl: 3    methylPREDNISolone (MEDROL DOSEPACK) 4 mg tablet, use as directed. Start on Tuesday, Disp: 1 Package, Rfl: 0    metOLazone (ZAROXOLYN) 2.5 MG tablet, Take 1 tablet (2.5 mg total) by mouth once daily., Disp: 90 tablet, Rfl: 3    metoprolol succinate (TOPROL-XL) 50 MG 24 hr tablet, TAKE 1 TABLET EVERY DAY, Disp: 90 tablet, Rfl: 5    nitroGLYCERIN (NITROSTAT) 0.3 MG SL tablet, PLACE 1 TABLET UNDER THE TONGUE EVERY 5 MINUTES AS NEEDED FOR CHEST PAIN, NO MORE THAN 3 TABLETS IN ONE DAY, Disp: 100 tablet, Rfl: 0    NOVOLOG FLEXPEN 100 unit/mL InPn pen, ADMINISTER 20 UNITS UNDER THE SKIN THREE TIMES DAILY WITH MEALS, Disp: 15 mL, Rfl: 0    ondansetron (ZOFRAN-ODT) 4 MG TbDL, Take 1 tablet (4 mg total) by mouth every 6 (six) hours as needed., Disp: 6 tablet, Rfl: 1    pantoprazole (PROTONIX) 40 MG tablet, Take 1 tablet (40 mg total) by mouth once daily., Disp: 30 tablet, Rfl: 11    pen needle, diabetic (BD INSULIN PEN NEEDLE UF SHORT) 31 gauge x 5/16" Ndle, Inject 1 each into the skin 3 (three) times daily., Disp: 90 each, Rfl: 11    potassium chloride (K-TAB) 20 mEq, Take 1 tablet (20 mEq total) by mouth once daily. for 90 doses, Disp: 90 tablet, Rfl: 3    sertraline (ZOLOFT) 50 MG tablet, TAKE 1 TABLET ONE TIME DAILY, Disp: 90 tablet, Rfl: 3    traZODone (DESYREL) 150 MG tablet, Take 1 tablet (150 mg total) by mouth nightly., Disp: 90 tablet, Rfl: 1    TRUE METRIX AIR GLUCOSE METER kit, TEST FOUR TIMES DAILY BEFORE MEALS  AND EVERY NIGHT, Disp: 1 each, Rfl: 0    VENTOLIN HFA 90 mcg/actuation inhaler, Inhale 2 puffs into the lungs every 4 (four) hours as needed for Wheezing., Disp: 3 Inhaler, Rfl: 4    " exenatide microspheres (BYDUREON BCISE) 2 mg/0.85 mL AtIn, Inject 2 mg into the skin every 7 days., Disp: 4 Syringe, Rfl: 11    tiotropium (SPIRIVA WITH HANDIHALER) 18 mcg inhalation capsule, Inhale 1 capsule (18 mcg total) into the lungs once daily. Controller, Disp: 90 capsule, Rfl: 3    STANDARDS OF CARE:  Eye doctor: Dr. Dias, last exam 12/06/2017.  Dental exam: Recommend regular exams; denies gums bleeding.  Podiatry doctor:  ACE/ARB: Yes  Statin: Yes     ACTIVITY LEVEL: He exercises rarely.  BLOOD GLUCOSE TESTING: Self-monitoring with   SOCIAL HISTORY: . Lives with spouse. retired no tobacco use       Health Maintenance   Topic Date Due    TETANUS VACCINE  06/20/1975    Zoster Vaccine  06/20/2017    Hemoglobin A1c  06/10/2019    Lipid Panel  09/13/2019    Eye Exam  10/02/2019    Urine Microalbumin  12/10/2019    Foot Exam  01/03/2020    Colonoscopy  10/22/2023    Hepatitis C Screening  Completed    Pneumococcal Vaccine (Medium Risk)  Completed    Influenza Vaccine  Completed         Diabetes Management Status    Statin: Not taking  ACE/ARB: Taking    Screening or Prevention Patient's value Goal Complete/Controlled?   HgA1C Testing and Control   Lab Results   Component Value Date    HGBA1C 8.7 (H) 12/10/2018      Annually/Less than 8% No   Lipid profile : 09/13/2018 Annually Yes   LDL control Lab Results   Component Value Date    LDLCALC 132.8 09/13/2018    Annually/Less than 100 mg/dl  No   Nephropathy screening Lab Results   Component Value Date    LABMICR 701.0 12/10/2018     Lab Results   Component Value Date    PROTEINUA Negative 09/18/2018    Annually Yes   Blood pressure BP Readings from Last 1 Encounters:   01/03/19 116/68    Less than 140/90 Yes   Dilated retinal exam : 10/02/2018 Annually Yes   Foot exam   : 01/03/2019 Annually Yes       The following results were reviewed with patient.    Lab Results   Component Value Date    WBC 10.22 12/02/2018    HGB 12.2 (L) 12/02/2018    HCT  36.3 (L) 12/02/2018     12/02/2018    CHOL 201 (H) 09/13/2018    TRIG 96 09/13/2018    HDL 49 09/13/2018    ALT 23 12/10/2018    AST 20 12/10/2018     12/10/2018    K 3.2 (L) 12/10/2018    CL 99 12/10/2018    CREATININE 1.0 12/10/2018    ESTGFRAFRICA >60.0 12/10/2018    EGFRNONAA >60.0 12/10/2018    BUN 14 12/10/2018    CO2 28 12/10/2018    TSH 0.848 09/18/2018    PSA 0.83 01/18/2013    INR 0.9 12/02/2018     (H) 12/10/2018       Lab Results   Component Value Date    HGBA1C 8.7 (H) 12/10/2018    HGBA1C 8.0 (H) 09/13/2018    HGBA1C 10.2 (H) 02/27/2018       Lab Results   Component Value Date    GLUTAMICACID 0.01 07/20/2017    CPEPTIDE 4.37 07/20/2017     Lab Results   Component Value Date    TSH 0.848 09/18/2018     Lab Results   Component Value Date    TOTALTESTOST 182 (L) 01/18/2013     Lab Results   Component Value Date    TESTOSTERONE 213 (L) 12/04/2014    TESTOSTERONE 35.9 (L) 12/04/2014     Lab Results   Component Value Date    FERRITIN 505 (H) 01/12/2017     Lab Results   Component Value Date    PTH 49.0 02/27/2018    CALCIUM 9.4 12/10/2018    CAION 1.18 02/27/2018    PHOS 3.5 10/23/2018     Review of patient's allergies indicates:  No Known Allergies    Past Medical History:   Diagnosis Date    Acute kidney injury     Arthritis     Asthma     Atrial fibrillation     CHF (congestive heart failure)     Depression     Diabetes mellitus, type 2 Diagnosed in 2000    Elevated PSA     Hypertension     Sleep apnea        Review of Systems   Constitutional: Negative.  Negative for activity change, appetite change, chills, diaphoresis, fatigue, fever and unexpected weight change.   HENT: Negative.  Negative for dental problem, facial swelling, hearing loss, nosebleeds, trouble swallowing and voice change.    Eyes: Negative.  Negative for photophobia, pain, discharge, redness, itching and visual disturbance.   Respiratory: Negative.  Negative for cough, choking, chest tightness, shortness  "of breath and wheezing.    Cardiovascular: Negative.  Negative for chest pain, palpitations and leg swelling.   Gastrointestinal: Negative.  Negative for abdominal distention, abdominal pain, blood in stool, constipation, diarrhea, nausea and vomiting.   Endocrine: Negative.  Negative for cold intolerance, heat intolerance, polydipsia, polyphagia and polyuria.   Genitourinary: Negative.  Negative for decreased urine volume, difficulty urinating, dysuria, frequency, scrotal swelling, testicular pain and urgency.   Musculoskeletal: Negative.  Negative for arthralgias, back pain, gait problem, joint swelling, myalgias, neck pain and neck stiffness.   Skin: Negative.  Negative for color change, pallor, rash and wound.   Allergic/Immunologic: Negative.  Negative for environmental allergies, food allergies and immunocompromised state.   Neurological: Negative.  Negative for dizziness, tremors, seizures, syncope, facial asymmetry, speech difficulty, weakness, light-headedness, numbness and headaches.   Hematological: Negative.  Negative for adenopathy. Does not bruise/bleed easily.   Psychiatric/Behavioral: Negative.  Negative for agitation, behavioral problems, confusion, decreased concentration, dysphoric mood, hallucinations, self-injury, sleep disturbance and suicidal ideas. The patient is not nervous/anxious and is not hyperactive.       Objective:     Vitals - 1 value per visit 12/2/2018 12/10/2018 1/3/2019   SYSTOLIC 132 130 116   DIASTOLIC 65 86 68   PULSE 101 92 -   TEMPERATURE 98.1 97.3 -   RESPIRATIONS 21 16 -   SPO2 98 98 -   Weight (lb) 200.3 198.86 198.41   Weight (kg) 90.855 90.2 90   HEIGHT 5' 5" 5' 5" 5' 5"   BODY MASS INDEX 33.33 33.09 33.02   VISIT REPORT - - -   Pain Score  - 7 7   Some recent data might be hidden     Physical Exam   Constitutional: He is oriented to person, place, and time. He appears well-developed and well-nourished.   HENT:   Head: Normocephalic and atraumatic.   Right Ear: External " ear normal.   Left Ear: External ear normal.   Nose: Nose normal.   Mouth/Throat: Oropharynx is clear and moist.   Eyes: EOM are normal. Pupils are equal, round, and reactive to light.   Neck: Normal range of motion. Neck supple. No JVD present. No tracheal deviation present. No thyromegaly present.   Cardiovascular: Normal rate, regular rhythm, normal heart sounds and intact distal pulses. Exam reveals no gallop and no friction rub.   No murmur heard.  Pulmonary/Chest: Effort normal. No stridor. No respiratory distress. He has wheezes (and rhonchi left lung). He has no rales. He exhibits no tenderness.   Abdominal: Soft. Bowel sounds are normal. He exhibits no distension and no mass. There is no tenderness. There is no rebound and no guarding.   Musculoskeletal: Normal range of motion. He exhibits no edema or tenderness.   Lymphadenopathy:     He has no cervical adenopathy.   Neurological: He is alert and oriented to person, place, and time. He has normal reflexes. No cranial nerve deficit. Coordination normal.   Skin: Skin is warm and dry. No rash noted. No erythema. No pallor.   Psychiatric: He has a normal mood and affect.     Assessment:     1. Uncontrolled type 2 diabetes mellitus with hyperglycemia       Plan:     Crow Green is seen today for   1. Uncontrolled type 2 diabetes mellitus with hyperglycemia      We have discussed the etiology and treatment options associated with the diagnosis as well as alternatives. He has elected the following treatments.      - continue to hold medication secondary to hypoglycemia   -  Continue with D&E, reduce portion size, and restrict carbohydrates (no more that 45 grams ) per meal.   - CGM with follow-up in 2 weeks.      Uncontrolled type 2 diabetes mellitus with hyperglycemia  -     POCT Glucose, Hand-Held Device  -     GLUCOSE MONITORING CONTINUOUS MIN 72 HOURS; Future      1.) Patient was instructed to monitor blood glucose twice daily, fasting, and 2 hour post  meal; if on Multiple Daily Injections (MDI) he will need to have pre-meal blood glucose as well. Reminded to bring BG meter or record to each visit for review.  2.) Reviewed pathophysiology of diabetes, complications related to the disease, importance of annual dilated eye exam and self daily foot examination.  3.) Hold medication (hypoglycemia) except for metformin; he is prescribed Bydureon; Metformin; Amaryl; and MDI with Lantus and Novolog.  [facility] MyChart or Phone review in 1 week with BG records for adjustment of medication.  4.) Advised patient to continue to follow up with Dietician/CDE as directed.  5.) Discussed activity, benefits, methods, and precautions. Recommended patient start/continue some form of exercise and increase as tolerated to 60 minutes per day to facilitate weight loss and aid in control of BGs. Also reminded patient of WHO recommendation of 10,000 steps daily as a goal.   6.) A1C, TSH, Lipid Panel, CMP/renal panel with eGFR and Micro/Creatinine prior to next visit, if not already done.  7.) Return to clinic in 2 weeks for follow up. Advised patient to call clinic with any questions or concerns.    A total of 30 minutes was spent in face to face time, of which 50 % was spent in counseling patient on disease process, complications, treatment, and side effects of medications.    The patient was explained the above plan and given opportunity to ask questions.  He understands, chooses and consents to this plan and accepts all the risks, which include but are not limited to the risks mentioned above.   He understands the alternative of having no testing, interventions or treatments at this time. He left content and without further questions.

## 2019-01-03 NOTE — LETTER
January 3, 2019      [place]  [address] [facility] [address]  [place] [address]           [facility] - Diabetes Management  [address] [place] [address] [address]  [place] [address]  Phone: [phone]  Fax: [phone]          Patient: Crow Green   MR Number: [id number]   YOB: 1955   Date of Visit: 1/3/2019       Dear Dr. Mateo Lambert:    Thank you for referring Crow Green to me for evaluation. Attached you will find relevant portions of my assessment and plan of care.    If you have questions, please do not hesitate to call me. I look forward to following Crow Green along with you.    Sincerely,    Selene Fishman Jr., RENAE    Enclosure  CC:  No Recipients    If you would like to receive this communication electronically, please contact [email or web address] or [phone] to request more information on [facility] [employer] access.    For providers and/or their staff who would like to refer a patient to [facility], please contact us through our one-stop-shop provider referral line, [facility], at [phone].    If you feel you have received this communication in error or would no longer like to receive these types of communications, please e-mail [email or web address]

## 2019-01-03 NOTE — PROGRESS NOTES
Subjective:       Patient ID: Crow Green is a 63 y.o. male.    Chief Complaint: Cough  pt reports to clinic with chief complaint of cough for 1 month.  Cough is productive of clear sputum.  Worsens at night.  Has been evaluated in ER and by PCP.  CXRs were negative for acute process.  Pt has been treated with a zpak, steroids and phenergan DM (2 bottles), patient is requesting a refill on codeine DM states (it will knock it out).  Denies increased sodium intake, no weight gain, on daily PPI.  Pt also complains of substernum chest pain.  Pain has been intermittent for weeks.  No palpitations or dysnpena.  PMH of DM2, CAD, CHf, HTN  Cough   This is a recurrent problem. The current episode started more than 1 month ago. The problem has been unchanged. The cough is non-productive. Associated symptoms include nasal congestion, shortness of breath and wheezing. Pertinent negatives include no ear congestion. The symptoms are aggravated by lying down. He has tried prescription cough suppressant and oral steroids for the symptoms. The treatment provided no relief.     Review of Systems   Constitutional: Negative.    HENT: Positive for congestion.    Respiratory: Positive for cough, shortness of breath and wheezing.    Cardiovascular: Negative.    Gastrointestinal: Negative.        Objective:      Physical Exam   Constitutional: He is oriented to person, place, and time. He appears well-developed and well-nourished.   Eyes: EOM are normal.   Neck: Neck supple.   Cardiovascular: Normal rate.   Pulmonary/Chest: Effort normal and breath sounds normal. No stridor. No respiratory distress.   Musculoskeletal: He exhibits no edema.   Neurological: He is alert and oriented to person, place, and time.   Vitals reviewed.      Assessment:       1. Cough    2. Chest pain, unspecified type    3. Coronary artery disease of native artery of native heart with stable angina pectoris    4. PND (post-nasal drip)        Plan:    Cough  -     benzonatate (TESSALON) 100 MG capsule; Take 2 capsules (200 mg total) by mouth 3 (three) times daily as needed for Cough.  Dispense: 50 capsule; Refill: 1  No refill on cough syrup.  Continue PPI to rule out GERD as cause of chronic cough, will treat for PND as cause.  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

## 2019-01-16 ENCOUNTER — TELEPHONE (OUTPATIENT)
Dept: PAIN MEDICINE | Facility: CLINIC | Age: 64
End: 2019-01-16

## 2019-01-16 NOTE — TELEPHONE ENCOUNTER
----- Message from Fransisca Saleem MA sent at 1/11/2019  7:19 AM CST -----  Spoke with mr Sims he would like to reschedule the inj he has cancelled the f/u inj appt until

## 2019-01-23 ENCOUNTER — OFFICE VISIT (OUTPATIENT)
Dept: OPHTHALMOLOGY | Facility: CLINIC | Age: 64
End: 2019-01-23
Payer: MEDICARE

## 2019-01-23 DIAGNOSIS — M51.26 HNP (HERNIATED NUCLEUS PULPOSUS), LUMBAR: ICD-10-CM

## 2019-01-23 DIAGNOSIS — H40.1132 PRIMARY OPEN ANGLE GLAUCOMA (POAG) OF BOTH EYES, MODERATE STAGE: Primary | ICD-10-CM

## 2019-01-23 DIAGNOSIS — M47.26 OSTEOARTHRITIS OF SPINE WITH RADICULOPATHY, LUMBAR REGION: ICD-10-CM

## 2019-01-23 PROCEDURE — 92014 COMPRE OPH EXAM EST PT 1/>: CPT | Mod: HCNC,S$GLB,, | Performed by: OPTOMETRIST

## 2019-01-23 PROCEDURE — 92083 EXTENDED VISUAL FIELD XM: CPT | Mod: HCNC,S$GLB,, | Performed by: OPTOMETRIST

## 2019-01-23 PROCEDURE — 92083 HUMPHREY VISUAL FIELD - OU - BOTH EYES: ICD-10-PCS | Mod: HCNC,S$GLB,, | Performed by: OPTOMETRIST

## 2019-01-23 PROCEDURE — 92133 POSTERIOR SEGMENT OCT OPTIC NERVE(OCULAR COHERENCE TOMOGRAPHY) - OU - BOTH EYES: ICD-10-PCS | Mod: HCNC,S$GLB,, | Performed by: OPTOMETRIST

## 2019-01-23 PROCEDURE — 92133 CPTRZD OPH DX IMG PST SGM ON: CPT | Mod: HCNC,S$GLB,, | Performed by: OPTOMETRIST

## 2019-01-23 PROCEDURE — 99999 PR PBB SHADOW E&M-EST. PATIENT-LVL II: CPT | Mod: PBBFAC,HCNC,, | Performed by: OPTOMETRIST

## 2019-01-23 PROCEDURE — 99999 PR PBB SHADOW E&M-EST. PATIENT-LVL II: ICD-10-PCS | Mod: PBBFAC,HCNC,, | Performed by: OPTOMETRIST

## 2019-01-23 PROCEDURE — 92014 PR EYE EXAM, EST PATIENT,COMPREHESV: ICD-10-PCS | Mod: HCNC,S$GLB,, | Performed by: OPTOMETRIST

## 2019-01-23 RX ORDER — GABAPENTIN 600 MG/1
600 TABLET ORAL 3 TIMES DAILY
Qty: 90 TABLET | Refills: 1 | Status: SHIPPED | OUTPATIENT
Start: 2019-01-23 | End: 2019-03-20 | Stop reason: SDUPTHER

## 2019-01-23 NOTE — PROGRESS NOTES
HPI     Last [facility] visit 10/02/2018  Glaucoma  Diabetic/IDDM  3 month IOP check   Last HVF/GOCT 03/05/2018  Repeat HVF/GOCT (Today)  Medication: Latanoprost 0.005% 1 drop OU QHS   C/o decreased distance vision     He will need dilated exam at next visit for internal and SDP    Last edited by SOILA Dias, OD on 1/23/2019  5:08 PM. (History)            Assessment /Plan     For exam results, see Encounter Report.    Primary open angle glaucoma (POAG) of both eyes, moderate stage  -     OCT - Optic Nerve  -     Ch Visual Field - OU - Extended - Both Eyes      He will need dilated exam at next visit for internal and SDP.    HVF OD:  Very poor reliability.  OD has always been worse in VF loss than OS but moreso today.  The reliability is so poor that it is hard to  the HVF on its own.  The OCT for the OD today shows no progression from the last one done.      HVF OS:  Very poor reliability.  There also seems to be some progression in the superior arcuate defect compared to last year.  However, the OCT does not show any progression.    Because of the poor reliability OU, I will repeat in 2 months and will likely add a second drop OU or refer for surgical intervention depending on the HVF results.  I will also do a SDP then.      OCT:  Stable OU but see above.  RTC 2 months.

## 2019-01-24 ENCOUNTER — TELEPHONE (OUTPATIENT)
Dept: INTERNAL MEDICINE | Facility: CLINIC | Age: 64
End: 2019-01-24

## 2019-01-24 NOTE — TELEPHONE ENCOUNTER
----- Message from Ly Syed sent at 1/24/2019  2:06 PM CST -----  Contact: Pt   Pt called and stated he was returning a call to the nurse. He can be reached at [phone] (home) [phone] (work).    Thanks,  TF

## 2019-01-24 NOTE — TELEPHONE ENCOUNTER
----- Message from Cheryl Hull sent at 1/24/2019 11:34 AM CST -----  Contact: pt  The pt request a return call, no additional info given, can be reached at [phone]///[email or web address]

## 2019-01-29 ENCOUNTER — OFFICE VISIT (OUTPATIENT)
Dept: PULMONOLOGY | Facility: CLINIC | Age: 64
End: 2019-01-29
Payer: MEDICARE

## 2019-01-29 VITALS
BODY MASS INDEX: 32.8 KG/M2 | HEIGHT: 65 IN | RESPIRATION RATE: 17 BRPM | SYSTOLIC BLOOD PRESSURE: 130 MMHG | HEART RATE: 91 BPM | DIASTOLIC BLOOD PRESSURE: 80 MMHG | WEIGHT: 196.88 LBS | OXYGEN SATURATION: 96 %

## 2019-01-29 DIAGNOSIS — J45.40 MODERATE PERSISTENT ASTHMA WITHOUT COMPLICATION: Primary | ICD-10-CM

## 2019-01-29 DIAGNOSIS — G47.33 OSA ON CPAP: ICD-10-CM

## 2019-01-29 DIAGNOSIS — E66.9 OBESITY (BMI 30.0-34.9): ICD-10-CM

## 2019-01-29 PROCEDURE — 3079F DIAST BP 80-89 MM HG: CPT | Mod: HCNC,CPTII,S$GLB, | Performed by: NURSE PRACTITIONER

## 2019-01-29 PROCEDURE — 3008F PR BODY MASS INDEX (BMI) DOCUMENTED: ICD-10-PCS | Mod: HCNC,CPTII,S$GLB, | Performed by: NURSE PRACTITIONER

## 2019-01-29 PROCEDURE — 99214 PR OFFICE/OUTPT VISIT, EST, LEVL IV, 30-39 MIN: ICD-10-PCS | Mod: HCNC,S$GLB,, | Performed by: NURSE PRACTITIONER

## 2019-01-29 PROCEDURE — 99999 PR PBB SHADOW E&M-EST. PATIENT-LVL V: ICD-10-PCS | Mod: PBBFAC,HCNC,, | Performed by: NURSE PRACTITIONER

## 2019-01-29 PROCEDURE — 99214 OFFICE O/P EST MOD 30 MIN: CPT | Mod: HCNC,S$GLB,, | Performed by: NURSE PRACTITIONER

## 2019-01-29 PROCEDURE — 99999 PR PBB SHADOW E&M-EST. PATIENT-LVL V: CPT | Mod: PBBFAC,HCNC,, | Performed by: NURSE PRACTITIONER

## 2019-01-29 PROCEDURE — 3079F PR MOST RECENT DIASTOLIC BLOOD PRESSURE 80-89 MM HG: ICD-10-PCS | Mod: HCNC,CPTII,S$GLB, | Performed by: NURSE PRACTITIONER

## 2019-01-29 PROCEDURE — 3075F PR MOST RECENT SYSTOLIC BLOOD PRESS GE 130-139MM HG: ICD-10-PCS | Mod: HCNC,CPTII,S$GLB, | Performed by: NURSE PRACTITIONER

## 2019-01-29 PROCEDURE — 3008F BODY MASS INDEX DOCD: CPT | Mod: HCNC,CPTII,S$GLB, | Performed by: NURSE PRACTITIONER

## 2019-01-29 PROCEDURE — 3075F SYST BP GE 130 - 139MM HG: CPT | Mod: HCNC,CPTII,S$GLB, | Performed by: NURSE PRACTITIONER

## 2019-01-29 RX ORDER — HYDROCODONE BITARTRATE AND ACETAMINOPHEN 10; 325 MG/1; MG/1
TABLET ORAL
Qty: 14 TABLET | Refills: 0 | Status: SHIPPED | OUTPATIENT
Start: 2019-01-29 | End: 2019-02-12 | Stop reason: SDUPTHER

## 2019-01-29 NOTE — ASSESSMENT & PLAN NOTE
Encouraged calorie reduction and 30 minutes of exercise daily. Discussed impact of obesity on general health.  Working on eating salads, fish, cutting fried foods.   Walking program most days  Plans being active with program with [facility]

## 2019-01-29 NOTE — TELEPHONE ENCOUNTER
Patient call in regards to have the epidural injection, would like to see if that can be schedule for today. Patient has an appointment with pulmonary this morning at 10:40 am. Please advise

## 2019-01-29 NOTE — PROGRESS NOTES
Subjective:      Patient ID: Crow Green is a 63 y.o. male.    Chief Complaint: Sleep Apnea    HPI: Crow Green is here for follow up for SANDRITA and CPAP complaince assessment. He is on Auto CPAP of 6-14 cmH2O pressure which is  optimally controlling sleep apnea with apneic index (AHI) 3.0 events an hour.   He is not compliant with CPAP use. Complaince download today reveals 0.0% of days with greater than 4 hours of device use. Patient has not used CPAP since December 2018. He reports he neglecting putting CPAP on, despite reporting benefit when used.   Patient reports no complaints. Nasal mask is used and well tolerated.   [place] 16    Asthma  Well controlled on current regimen.   Advair, Spiriva handihaler, albuterol inhaler, combivent inhaler. Has not needed rescue inhalers in past 2 months.  ACT score 24, asthma scores 19 or greater indicate well controlled asthma     Previous Report Reviewed: lab reports and office notes     Past Medical History: The following portions of the patient's history were reviewed and updated as appropriate:   He  has a past surgical history that includes Cholecystectomy.  His family history includes Cataracts in his father, mother, and sister; Glaucoma in his maternal aunt, mother, and sister; No Known Problems in his brother, daughter, and son.  He  reports that  has never smoked. he has never used smokeless tobacco. He reports that he does not drink alcohol or use drugs.  He has a current medication list which includes the following prescription(s): alcohol antiseptic pads, amlodipine, ammonium lactate, atorvastatin, baclofen, eliquis, furosemide, gabapentin, glimepiride, inhalation spacing device, inhalation spacing device, insulin, insulin syringe-needle u-100, ipratropium-albuterol, isosorbide mononitrate, latanoprost, levocetirizine, lisinopril, metformin, metolazone, metoprolol succinate, nitroglycerin, novolog flexpen u-100 insulin, ondansetron, pantoprazole,  "pen needle, diabetic, sertraline, trazodone, true metrix air glucose meter, ventolin hfa, aspirin, fluticasone-salmeterol 250-50 mcg/dose, hydrocodone-acetaminophen, and tiotropium.  He has No Known Allergies..    The following portions of the patient's history were reviewed and updated as appropriate: allergies, current medications, past family history, past medical history, past social history, past surgical history and problem list.    Review of Systems   Constitutional: Negative for fever, chills, weight loss, weight gain, activity change, appetite change, fatigue and night sweats.   HENT: Negative for postnasal drip, rhinorrhea, sinus pressure, voice change and congestion.    Eyes: Negative for redness and itching.   Respiratory: Negative for snoring, cough, sputum production, chest tightness, shortness of breath, wheezing, orthopnea, asthma nighttime symptoms, dyspnea on extertion, use of rescue inhaler and somnolence.    Cardiovascular: Negative.  Negative for chest pain, palpitations and leg swelling.   Genitourinary: Negative for difficulty urinating and hematuria.   Endocrine: Negative for cold intolerance and heat intolerance.    Musculoskeletal: Negative for arthralgias, gait problem, joint swelling and myalgias.   Skin: Negative.    Gastrointestinal: Negative for nausea, vomiting, abdominal pain and acid reflux.   Neurological: Negative for dizziness, weakness, light-headedness and headaches.   Hematological: Negative for adenopathy. No excessive bruising.   All other systems reviewed and are negative.     Objective:   /80   Pulse 91   Resp 17   Ht 5' 5" (1.651 m)   Wt 89.3 kg (196 lb 13.9 oz)   SpO2 96%   BMI 32.76 kg/m²   Physical Exam   Constitutional: He is oriented to person, place, and time. He appears well-developed and well-nourished. He is active and cooperative.  Non-toxic appearance. He does not appear ill. No distress.   HENT:   Head: Normocephalic and atraumatic.   Right Ear: " External ear normal.   Left Ear: External ear normal.   Nose: Nose normal.   Mouth/Throat: Oropharynx is clear and moist. No oropharyngeal exudate.   Eyes: Conjunctivae are normal.   Neck: Normal range of motion. Neck supple.   Cardiovascular: Normal rate, regular rhythm, normal heart sounds and intact distal pulses.   Pulmonary/Chest: Effort normal and breath sounds normal.   Abdominal: Soft.   Musculoskeletal: He exhibits no edema.   Neurological: He is alert and oriented to person, place, and time.   Skin: Skin is warm and dry.   Psychiatric: He has a normal mood and affect. His behavior is normal. Judgment and thought content normal.   Vitals reviewed.    Personal Diagnostic Review  CPAP download  APAP 6-14 cm  Compliance Summary  11/23/2018 - 12/22/2018 (30 days)  Days with Device Usage 5 days  Days without Device Usage 25 days  Percent Days with Device Usage 16.7%  Cumulative Usage 3 hrs. 25 mins. 29 secs.  Maximum Usage (1 Day) 2 hrs. 37 mins. 50 secs.  Average Usage (All Days) 6 mins. 50 secs.  Average Usage (Days Used) 41 mins. 5 secs.  Minimum Usage (1 Day) 2 mins.  Percent of Days with Usage >= 4 Hours 0.0%  Percent of Days with Usage < 4 Hours 100.0%  Date Range  Total Blower Time 22 hrs. 46 mins. 39 secs.  Average AHI 3.0  Auto-CPAP Summary  Auto-CPAP Mean Pressure 7.2 cmH2O  Auto-CPAP Peak Average Pressure 7.5 cmH2O  Average Device Pressure <= 90% of Time 8.3 cmH2O  Average Time in Large Leak Per Day 1 mins.    Assessment:     1. Moderate persistent asthma without complication    2. SANDRITA on CPAP    3. Obesity (BMI 30.0-34.9)      Orders Placed This Encounter   Procedures    Spirometry with/without bronchodilator     Standing Status:   Future     Standing Expiration Date:   1/29/2020     Plan:     Problem List Items Addressed This Visit     Asthma - Primary     Well controlled, ACT score 24,asthma scores 19 or greater indicate well controlled asthma   Continued current regimen: Advair 250 mcg, spiriva  handihaler, combivent, albuterol inhaler.   Follow up in 3 months for spirometry           Relevant Orders    Spirometry with/without bronchodilator    Obesity (BMI 30.0-34.9)     Encouraged calorie reduction and 30 minutes of exercise daily. Discussed impact of obesity on general health.  Working on eating salads, fish, cutting fried foods.   Walking program most days  Plans being active with program with [employer]          SANDRITA on CPAP     Remains not compliant with auto CPAP 6-14 cm with nasal mask  Adherence  Follow up in 3 months for compliance download  [facility]: [facility]               [facility]  Reviewed therapeutic goals for positive airway pressure therapy Auto CPAP  Ideal is usage 100% of nights for 6 - 8 hours per night. Minimum usage is 70% of night for at least 4 hours per night used.   Follow-up in about 3 months (around 4/29/2019) for Asthma review mark. CPAP compliance download 3 month fu, not compliant with cpap use.

## 2019-01-29 NOTE — TELEPHONE ENCOUNTER
Am only going to send 14 tabs of norco. His fill pattern on this is becoming chronic and is not appropriate for long term use. He will need to utilize other medications/injections going forward.

## 2019-01-29 NOTE — TELEPHONE ENCOUNTER
----- Message from Roberta Rey sent at 1/29/2019  8:04 AM CST -----  Contact: self  Refill request. Pt would like to speak to nurse in regards to appt also. Please call pt back at [phone].    1. What is the name of the medication you are requesting? Hydrocodone (pain med)   2. What is the dose? 10 mg  3. How do you take the medication? Orally, topically, etc? orally  4. How often do you take this medication? Twice a day as needed  5. Do you need a 30 day or 90 day supply? 30  6. How many refills are you requesting? n/a  7. What is your preferred pharmacy and location of the pharmacy?     Pt uses:    [id number] - [facility] [place] - [address] AT [place] [facility]  [address] [place] [address]  Phone: [phone] Fax: [phone]    8. Who can we contact with further questions? N/a    Thanks,   Roberta Rey

## 2019-01-29 NOTE — ASSESSMENT & PLAN NOTE
Well controlled, ACT score 24,asthma scores 19 or greater indicate well controlled asthma   Continued current regimen: Advair 250 mcg, spiriva handihaler, combivent, albuterol inhaler.   Follow up in 3 months for spirometry

## 2019-01-29 NOTE — ASSESSMENT & PLAN NOTE
Remains not compliant with auto CPAP 6-14 cm with nasal mask  Adherence  Follow up in 3 months for compliance download  HME: [facility]

## 2019-02-05 ENCOUNTER — TELEPHONE (OUTPATIENT)
Dept: INTERNAL MEDICINE | Facility: CLINIC | Age: 64
End: 2019-02-05

## 2019-02-05 NOTE — TELEPHONE ENCOUNTER
----- Message from Anna Bashir sent at 2/5/2019  9:29 AM CST -----  Contact: pt   Pt states that he need to speak to the nurse. Pt didn't want to give anymore info about the call.     .[phone] (home)

## 2019-02-05 NOTE — TELEPHONE ENCOUNTER
Call return to patient. Pt advised if not having any problems or concerns at this time no need to have a office visit. Pt denies having any concerns. Pt lov 12/10/18. Pt voiced understanding

## 2019-02-07 ENCOUNTER — PATIENT OUTREACH (OUTPATIENT)
Dept: ADMINISTRATIVE | Facility: HOSPITAL | Age: 64
End: 2019-02-07

## 2019-02-11 RX ORDER — HYDROCODONE BITARTRATE AND ACETAMINOPHEN 10; 325 MG/1; MG/1
TABLET ORAL
Qty: 14 TABLET | Refills: 0 | OUTPATIENT
Start: 2019-02-11

## 2019-02-11 NOTE — TELEPHONE ENCOUNTER
----- Message from Fouzia Timmons sent at 2/11/2019  8:11 AM CST -----  Contact: self/[phone]  Type:  RX Refill Request    Who Called: patient  Refill or New Rx:refill  RX Name and Strength:Hydrocodine 10 mg  How is the patient currently taking it? (ex. 1XDay):twice a day  Is this a 30 day or 90 day RX:30  Preferred Pharmacy with phone number:.      [id number] - [facility] [place] - [address] [facility]  [address] [place] [address]  Phone: [phone] Fax: [phone]      Local or Mail Order:local  Ordering Provider:Dr. Lambert  Would the patient rather a call back or a response via [facility]? Call back   Best Call Back Number:[phone]  Additional Information:

## 2019-02-12 ENCOUNTER — TELEPHONE (OUTPATIENT)
Dept: PAIN MEDICINE | Facility: CLINIC | Age: 64
End: 2019-02-12

## 2019-02-12 ENCOUNTER — TELEPHONE (OUTPATIENT)
Dept: INTERNAL MEDICINE | Facility: CLINIC | Age: 64
End: 2019-02-12

## 2019-02-12 RX ORDER — HYDROCODONE BITARTRATE AND ACETAMINOPHEN 10; 325 MG/1; MG/1
TABLET ORAL
Qty: 14 TABLET | Refills: 0 | Status: SHIPPED | OUTPATIENT
Start: 2019-02-12 | End: 2019-02-13 | Stop reason: SDUPTHER

## 2019-02-12 NOTE — TELEPHONE ENCOUNTER
----- Message from Skylar Whitt sent at 2/12/2019 11:59 AM CST -----  Contact: pt  Type:  Needs Medical Advice    Who Called: Patient  Would the patient rather a call back or a response via [facility]? Call back  Best Call Back Number: [phone]  Additional Information: pt need to schedule pain injection

## 2019-02-12 NOTE — TELEPHONE ENCOUNTER
----- Message from Saira Tyson sent at 2/12/2019  8:17 AM CST -----  .Type:  RX Refill Request    Who Called: patient  Refill or New Rx:refill  RX Name and Strength:hydrocodine, 10mg  How is the patient currently taking it? (ex. 1XDay):2x daily  Is this a 30 day or 90 day RX:  Preferred Pharmacy with phone number:..    [id number] - [facility] [place] - [address] AT [facility]  [address] [place] [address]  Phone: [phone] Fax: [phone]      Local or Mail Order:local  Ordering Provider:weeks  Would the patient rather a call back or a response via [facility]? Call back  Best Call Back Number:..[phone]   Additional Information: patient needs status of refill

## 2019-02-12 NOTE — TELEPHONE ENCOUNTER
----- Message from Awilda Jamison sent at 2/12/2019  4:22 PM CST -----  Contact: self [phone]  Type:  RX Refill Request    Who Called: Crow Green  Refill or New Rx:refill  RX Name and Strength:hydrocodone 10mg  How is the patient currently taking it? (ex. 1XDay):2x day  Is this a 30 day or 90 day RX:30 day  Preferred Pharmacy with phone number:    [id number] - [facility] [place] - [address] [facility]  [address]  [facility] [place] [address]  Phone: [phone] Fax: [phone]    Local or Mail Order:local  Ordering Provider:Weeks  Would the patient rather a call back or a response via [facility]? Call back   Best Call Back Number:[phone]  Additional Information: states that pharm did not receive the rx.

## 2019-02-12 NOTE — TELEPHONE ENCOUNTER
----- Message from Skylar Whitt sent at 2/12/2019 11:59 AM CST -----  Contact: pt  Type:  Needs Medical Advice    Who Called: Patient  Would the patient rather a call back or a response via [facility]? Call back  Best Call Back Number: [phone]  Additional Information: pt need to schedule pain injection

## 2019-02-12 NOTE — TELEPHONE ENCOUNTER
Patient notified Rx has been sent to his pharmacy. Patient also informed he need to schedule an appointment before any more refills. Patient has been schedule for 02/20/19 at 9:40 am

## 2019-02-13 ENCOUNTER — TELEPHONE (OUTPATIENT)
Dept: INTERNAL MEDICINE | Facility: CLINIC | Age: 64
End: 2019-02-13

## 2019-02-13 RX ORDER — HYDROCODONE BITARTRATE AND ACETAMINOPHEN 10; 325 MG/1; MG/1
TABLET ORAL
Qty: 14 TABLET | Refills: 0 | Status: SHIPPED | OUTPATIENT
Start: 2019-02-13 | End: 2019-02-21

## 2019-02-13 NOTE — TELEPHONE ENCOUNTER
Patient notified Rx was sent to his pharmacy and will need to schedule an appointment before next refill

## 2019-02-13 NOTE — TELEPHONE ENCOUNTER
----- Message from Skylar Whitt sent at 2/13/2019  7:19 AM CST -----  Contact: pt  Type:  RX Refill Request    Who Called: Patient  Refill or New Rx:Refill  RX Name and Strength:Hydrocodone 10mg  How is the patient currently taking it? (ex. 1XDay):2xday  Is this a 30 day or 90 day RX:30  Preferred Pharmacy with phone number: [facility]/[address]  Local or Mail Order:Local  Ordering Provider:Dr Lambert  Would the patient rather a call back or a response via [facility]? Call back  Best Call Back Number:[phone]  Additional Information: pt left 5 message yesterday

## 2019-02-21 ENCOUNTER — OFFICE VISIT (OUTPATIENT)
Dept: INTERNAL MEDICINE | Facility: CLINIC | Age: 64
End: 2019-02-21
Payer: MEDICARE

## 2019-02-21 ENCOUNTER — TELEPHONE (OUTPATIENT)
Dept: NEUROSURGERY | Facility: CLINIC | Age: 64
End: 2019-02-21

## 2019-02-21 VITALS
HEART RATE: 80 BPM | DIASTOLIC BLOOD PRESSURE: 68 MMHG | WEIGHT: 201.5 LBS | HEIGHT: 65 IN | OXYGEN SATURATION: 97 % | TEMPERATURE: 97 F | RESPIRATION RATE: 19 BRPM | BODY MASS INDEX: 33.57 KG/M2 | SYSTOLIC BLOOD PRESSURE: 118 MMHG

## 2019-02-21 DIAGNOSIS — I10 ESSENTIAL (PRIMARY) HYPERTENSION: ICD-10-CM

## 2019-02-21 DIAGNOSIS — I50.22 CHRONIC SYSTOLIC CONGESTIVE HEART FAILURE: ICD-10-CM

## 2019-02-21 DIAGNOSIS — N52.9 ERECTILE DYSFUNCTION, UNSPECIFIED ERECTILE DYSFUNCTION TYPE: ICD-10-CM

## 2019-02-21 DIAGNOSIS — M54.32 LEFT SIDED SCIATICA: Primary | ICD-10-CM

## 2019-02-21 DIAGNOSIS — H40.1134 PRIMARY OPEN ANGLE GLAUCOMA OF BOTH EYES, INDETERMINATE STAGE: ICD-10-CM

## 2019-02-21 DIAGNOSIS — M51.26 HNP (HERNIATED NUCLEUS PULPOSUS), LUMBAR: ICD-10-CM

## 2019-02-21 PROBLEM — J40 BRONCHITIS: Status: RESOLVED | Noted: 2018-12-10 | Resolved: 2019-02-21

## 2019-02-21 PROBLEM — N28.9 ACUTE RENAL INSUFFICIENCY: Status: RESOLVED | Noted: 2018-10-23 | Resolved: 2019-02-21

## 2019-02-21 PROCEDURE — 99214 OFFICE O/P EST MOD 30 MIN: CPT | Mod: HCNC,S$GLB,, | Performed by: FAMILY MEDICINE

## 2019-02-21 PROCEDURE — 99999 PR PBB SHADOW E&M-EST. PATIENT-LVL IV: ICD-10-PCS | Mod: PBBFAC,HCNC,, | Performed by: FAMILY MEDICINE

## 2019-02-21 PROCEDURE — 3078F DIAST BP <80 MM HG: CPT | Mod: HCNC,CPTII,S$GLB, | Performed by: FAMILY MEDICINE

## 2019-02-21 PROCEDURE — 99214 PR OFFICE/OUTPT VISIT, EST, LEVL IV, 30-39 MIN: ICD-10-PCS | Mod: HCNC,S$GLB,, | Performed by: FAMILY MEDICINE

## 2019-02-21 PROCEDURE — 3045F PR MOST RECENT HEMOGLOBIN A1C LEVEL 7.0-9.0%: ICD-10-PCS | Mod: HCNC,CPTII,S$GLB, | Performed by: FAMILY MEDICINE

## 2019-02-21 PROCEDURE — 3008F PR BODY MASS INDEX (BMI) DOCUMENTED: ICD-10-PCS | Mod: HCNC,CPTII,S$GLB, | Performed by: FAMILY MEDICINE

## 2019-02-21 PROCEDURE — 3045F PR MOST RECENT HEMOGLOBIN A1C LEVEL 7.0-9.0%: CPT | Mod: HCNC,CPTII,S$GLB, | Performed by: FAMILY MEDICINE

## 2019-02-21 PROCEDURE — 99999 PR PBB SHADOW E&M-EST. PATIENT-LVL IV: CPT | Mod: PBBFAC,HCNC,, | Performed by: FAMILY MEDICINE

## 2019-02-21 PROCEDURE — 3008F BODY MASS INDEX DOCD: CPT | Mod: HCNC,CPTII,S$GLB, | Performed by: FAMILY MEDICINE

## 2019-02-21 PROCEDURE — 3074F PR MOST RECENT SYSTOLIC BLOOD PRESSURE < 130 MM HG: ICD-10-PCS | Mod: HCNC,CPTII,S$GLB, | Performed by: FAMILY MEDICINE

## 2019-02-21 PROCEDURE — 3074F SYST BP LT 130 MM HG: CPT | Mod: HCNC,CPTII,S$GLB, | Performed by: FAMILY MEDICINE

## 2019-02-21 PROCEDURE — 3078F PR MOST RECENT DIASTOLIC BLOOD PRESSURE < 80 MM HG: ICD-10-PCS | Mod: HCNC,CPTII,S$GLB, | Performed by: FAMILY MEDICINE

## 2019-02-21 RX ORDER — HYDROCODONE BITARTRATE AND ACETAMINOPHEN 7.5; 325 MG/1; MG/1
1 TABLET ORAL EVERY 6 HOURS PRN
Qty: 14 TABLET | Refills: 0 | Status: SHIPPED | OUTPATIENT
Start: 2019-02-21 | End: 2019-02-21 | Stop reason: SDUPTHER

## 2019-02-21 RX ORDER — HYDROCODONE BITARTRATE AND ACETAMINOPHEN 10; 325 MG/1; MG/1
TABLET ORAL
Qty: 14 TABLET | Refills: 0 | Status: CANCELLED | OUTPATIENT
Start: 2019-02-21

## 2019-02-21 RX ORDER — LATANOPROST 50 UG/ML
1 SOLUTION/ DROPS OPHTHALMIC NIGHTLY
Qty: 1 BOTTLE | Refills: 4 | Status: CANCELLED | OUTPATIENT
Start: 2019-02-21 | End: 2020-02-21

## 2019-02-21 RX ORDER — TRAZODONE HYDROCHLORIDE 150 MG/1
TABLET ORAL
Qty: 90 TABLET | Refills: 1 | Status: SHIPPED | OUTPATIENT
Start: 2019-02-21 | End: 2020-04-09

## 2019-02-21 RX ORDER — HYDROCODONE BITARTRATE AND ACETAMINOPHEN 7.5; 325 MG/1; MG/1
1 TABLET ORAL EVERY 6 HOURS PRN
Qty: 14 TABLET | Refills: 0 | Status: SHIPPED | OUTPATIENT
Start: 2019-02-21 | End: 2019-03-01 | Stop reason: SDUPTHER

## 2019-02-21 NOTE — ASSESSMENT & PLAN NOTE
Agree to refill his pain medication but at a slightly lower dose.  I strongly urged him to call and get follow-up appointment with physiatry but also recommended that he consult with neuro surgery considering his findings of MRI last year and the persistent pain and restricted activity.  I am wondering if he might be a surgical candidate

## 2019-02-21 NOTE — ASSESSMENT & PLAN NOTE
Diabetes had improved at the last evaluation.  Due for repeat labs next month.  Continue on same treatment as well as dietary control.  Needs to the strive for some weight loss

## 2019-02-21 NOTE — ASSESSMENT & PLAN NOTE
Currently need to focus on underlying issues with erectile dysfunction such as weight loss.  Not sure whether some of his back pain could be causing problems with this also.  Not a candidate for Viagra or Cialis

## 2019-02-21 NOTE — TELEPHONE ENCOUNTER
Patient states went to his pharmacy and Rx was not there, it went to [employer]. Can you resend to [facility] in [place].

## 2019-02-21 NOTE — TELEPHONE ENCOUNTER
Informed pt call is in reference to a referral from Dr. Lambert d/t HNP (herniated nucleus pulposus), lumbar, pt is scheduled with Dr Chandra, on 2/26/19//ns

## 2019-02-21 NOTE — PROGRESS NOTES
Subjective:       Patient ID: Crow Green is a 63 y.o. male.    Chief Complaint: Erectile Dysfunction and Follow-up (joint pain)    HPI    Came in today for follow-up on chronic issues most particularly  His chronic back pain.  He has frequent pain that radiates down the back of his thighs and is made worse by ambulation but also will affect him sometimes whenever he is just sitting or laying still.  Not having any urinary issues but did mention that sometimes he will have fecal incontinence especially at nighttime.  During the day he does not have this problem but will have to go to the bathroom with loose stools.  He takes intermittent Norco which does relieve some of the pain for 2-3 hours.  He has had 1 injection with pain management but it was not too effective.  Has been trying to get in touch with the clinic to schedule follow-up for possible repeat injection.  MRI showed early last year, that he had some disc bulging.  Has not yet consulted with neuro surgery to discuss whether he would be a surgical candidate but he certainly is miserable and it depresses him because he can't do the things that he wants to do.  He is financially challenged and would love to be able to be more active and get back to work.    He says that he did take some Viagra from a friend of his but it did not work.  Did not have any chest pain or shortness of breath with this.  However, I told him that he should not be taking any medicine of this class such as Viagra or Cialis due to the fact that he takes isosorbide mononitrate on a daily basis    Family History   Problem Relation Age of Onset    Glaucoma Mother     Cataracts Mother     Cataracts Father     Glaucoma Sister     Cataracts Sister     Glaucoma Maternal Aunt     No Known Problems Brother     No Known Problems Daughter     No Known Problems Son     Prostate cancer Neg Hx        Current Outpatient Medications:     ALCOHOL ANTISEPTIC PADS (ALCOHOL PREP PADS  TOP), , Disp: , Rfl:     amLODIPine (NORVASC) 10 MG tablet, TAKE 1 TABLET ONE TIME DAILY, Disp: 90 tablet, Rfl: 3    ammonium lactate 12 % Crea, Apply 1 Act topically 2 (two) times daily. (Patient taking differently: Apply 1 Act topically once daily. ), Disp: 1 Tube, Rfl: 3    aspirin (ECOTRIN) 81 MG EC tablet, Take 1 tablet (81 mg total) by mouth once daily., Disp: , Rfl: 0    atorvastatin (LIPITOR) 40 MG tablet, TAKE 1 TABLET EVERY EVENING, Disp: 90 tablet, Rfl: 3    baclofen (LIORESAL) 10 MG tablet, Take 1 tablet (10 mg total) by mouth once daily., Disp: 90 tablet, Rfl: 3    ELIQUIS 5 mg Tab, , Disp: , Rfl:     fluticasone-salmeterol 250-50 mcg/dose (ADVAIR DISKUS) 250-50 mcg/dose diskus inhaler, Inhale 1 puff into the lungs 2 (two) times daily. Controller, Disp: 60 each, Rfl: 11    furosemide (LASIX) 40 MG tablet, Take 2 tablets (80 mg total) by mouth every morning AND 1 tablet (40 mg total) every evening., Disp: 90 tablet, Rfl: 11    gabapentin (NEURONTIN) 600 MG tablet, Take 1 tablet (600 mg total) by mouth 3 (three) times daily., Disp: 90 tablet, Rfl: 1    glimepiride (AMARYL) 2 MG tablet, Take 1 tablet (2 mg total) by mouth before breakfast., Disp: 90 tablet, Rfl: 3    inhalation spacing device, Use as directed for inhalation., Disp: 1 Device, Rfl: 0    inhalation spacing device, Use as directed for inhalation., Disp: 1 Device, Rfl: 0    insulin glargine (LANTUS SOLOSTAR) 100 unit/mL (3 mL) InPn pen, Inject 50 Units into the skin every evening. (Patient taking differently: Inject 70 Units into the skin every evening. ), Disp: 2 Box, Rfl: 11    insulin syringe-needle U-100 1 mL 31 gauge x 5/16 Syrg, , Disp: , Rfl:     ipratropium-albuterol (COMBIVENT)  mcg/actuation inhaler, Inhale 1 puff into the lungs 4 (four) times daily. Rescue, Disp: 4 g, Rfl: 0    isosorbide mononitrate (IMDUR) 60 MG 24 hr tablet, Take 1 tablet (60 mg total) by mouth once daily., Disp: 90 tablet, Rfl: 3     "latanoprost 0.005 % ophthalmic solution, Place 1 drop into both eyes every evening., Disp: 1 Bottle, Rfl: 4    lisinopril 10 MG tablet, Take 1 tablet (10 mg total) by mouth once daily., Disp: 90 tablet, Rfl: 3    metFORMIN (GLUCOPHAGE) 1000 MG tablet, Take 1 tablet (1,000 mg total) by mouth 2 (two) times daily with meals., Disp: 180 tablet, Rfl: 3    metOLazone (ZAROXOLYN) 2.5 MG tablet, Take 1 tablet (2.5 mg total) by mouth once daily., Disp: 90 tablet, Rfl: 3    metoprolol succinate (TOPROL-XL) 50 MG 24 hr tablet, TAKE 1 TABLET EVERY DAY, Disp: 90 tablet, Rfl: 5    nitroGLYCERIN (NITROSTAT) 0.3 MG SL tablet, PLACE 1 TABLET UNDER THE TONGUE EVERY 5 MINUTES AS NEEDED FOR CHEST PAIN, NO MORE THAN 3 TABLETS IN ONE DAY, Disp: 100 tablet, Rfl: 0    NOVOLOG FLEXPEN 100 unit/mL InPn pen, ADMINISTER 20 UNITS UNDER THE SKIN THREE TIMES DAILY WITH MEALS, Disp: 15 mL, Rfl: 0    pantoprazole (PROTONIX) 40 MG tablet, Take 1 tablet (40 mg total) by mouth once daily., Disp: 30 tablet, Rfl: 11    pen needle, diabetic (BD INSULIN PEN NEEDLE UF SHORT) 31 gauge x 5/16" Ndle, Inject 1 each into the skin 3 (three) times daily., Disp: 90 each, Rfl: 11    sertraline (ZOLOFT) 50 MG tablet, TAKE 1 TABLET ONE TIME DAILY, Disp: 90 tablet, Rfl: 3    TRUE METRIX AIR GLUCOSE METER kit, TEST FOUR TIMES DAILY BEFORE MEALS  AND EVERY NIGHT, Disp: 1 each, Rfl: 0    VENTOLIN HFA 90 mcg/actuation inhaler, Inhale 2 puffs into the lungs every 4 (four) hours as needed for Wheezing., Disp: 3 Inhaler, Rfl: 4    HYDROcodone-acetaminophen (NORCO) 7.5-325 mg per tablet, Take 1 tablet by mouth every 6 (six) hours as needed for Pain., Disp: 14 tablet, Rfl: 0    tiotropium (SPIRIVA WITH HANDIHALER) 18 mcg inhalation capsule, Inhale 1 capsule (18 mcg total) into the lungs once daily. Controller, Disp: 90 capsule, Rfl: 3    traZODone (DESYREL) 150 MG tablet, TAKE 1 TABLET EVERY NIGHT, Disp: 90 tablet, Rfl: 1    Review of Systems   Constitutional: " "Negative for chills and fever.   Eyes: Negative for visual disturbance.   Respiratory: Negative for cough and shortness of breath.    Cardiovascular: Negative for chest pain.   Gastrointestinal: Negative for abdominal pain.   Musculoskeletal: Positive for back pain.   Neurological: Negative for dizziness.       Objective:   /68 (BP Location: Right arm, Patient Position: Sitting, BP Method: Medium (Automatic))   Pulse 80   Temp 96.7 °F (35.9 °C) (Tympanic)   Resp 19   Ht 5' 5" (1.651 m)   Wt 91.4 kg (201 lb 8 oz)   SpO2 97%   BMI 33.53 kg/m²      Physical Exam   Constitutional: He is oriented to person, place, and time. He appears well-developed and well-nourished.   HENT:   Head: Normocephalic and atraumatic.   Eyes: Conjunctivae are normal.   Cardiovascular: Normal rate.   Pulmonary/Chest: Effort normal. No respiratory distress.   Musculoskeletal: He exhibits no edema.   Neurological: He is alert and oriented to person, place, and time. Coordination normal.   Skin: Skin is warm and dry. No rash noted.   Psychiatric: He has a normal mood and affect. His behavior is normal.   Vitals reviewed.      Assessment & Plan     Problem List Items Addressed This Visit        Neuro    HNP (herniated nucleus pulposus), lumbar    Current Assessment & Plan       Agree to refill his pain medication but at a slightly lower dose.  I strongly urged him to call and get follow-up appointment with physiatry but also recommended that he consult with neuro surgery considering his findings of MRI last year and the persistent pain and restricted activity.  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  Due for repeat labs next " month.  Continue on same treatment as well as dietary control.  Needs to the strive for some weight loss            Cardiac/Vascular    Essential (primary) hypertension    Current Assessment & Plan      Well controlled.  Continue on same medication.         Chronic systolic congestive heart failure    Current Assessment & Plan     Stable at this time. No symptoms/signs of decompensation              Renal/    ED (erectile dysfunction)    Current Assessment & Plan      Currently need to focus on underlying issues with erectile dysfunction such as weight loss.  Not sure whether some of his back pain could be causing problems with this also.  Not a candidate for Viagra or Cialis            Orthopedic    Left sided sciatica - Primary    Relevant Orders    Ambulatory Referral to Neurosurgery      Other Visit Diagnoses     Primary open angle glaucoma of both eyes, indeterminate stage                Follow-up in about 3 months (around 5/21/2019).

## 2019-02-25 ENCOUNTER — TELEPHONE (OUTPATIENT)
Dept: NEUROSURGERY | Facility: CLINIC | Age: 64
End: 2019-02-25

## 2019-02-25 NOTE — TELEPHONE ENCOUNTER
Re: Neurosurgery Appt.  Dr Chandra will be out office in the afternoon. RS patient's appt. Pt verbalized understanding.

## 2019-02-26 ENCOUNTER — TELEPHONE (OUTPATIENT)
Dept: NEUROSURGERY | Facility: CLINIC | Age: 64
End: 2019-02-26

## 2019-02-26 NOTE — TELEPHONE ENCOUNTER
Spoke with pt, he's rescheduled for 3/8/19 @1140//ns     ----- Message from Lucille Campbell sent at 2/26/2019  1:10 PM CST -----  Contact: Patient  Type:  Patient Returning Call    Who Called:Patent  Who Left Message for Patient:Nurse  Does the patient know what this is regarding?:change of appt  Would the patient rather a call back or a response via [facility]? call  Best Call Back Number:[phone]  Additional Information:

## 2019-02-26 NOTE — TELEPHONE ENCOUNTER
Left message referring to the message below, asked pt to give call back to reschedule since he's not able to keep appt for tomorrow//ns       ----- Message from Lucille Campbell sent at 2/26/2019 12:03 PM CST -----  Contact: Patient  Type:  Needs Medical Advice    Who Called: Patient  Symptoms (please be specific):   How long has patient had these symptoms:   Pharmacy name and phone #:    Would the patient rather a call back or a response via [facility]? call  Best Call Back Number: [phone]  Additional Information: Patient cannot make the appt tomorrow needs to reschedule

## 2019-02-26 NOTE — TELEPHONE ENCOUNTER
Spoke with pt, Pt rescheduled for tomorrow at 9am at the [facility] location//ns     ----- Message from Kaila Vaughan sent at 2/26/2019 11:18 AM CST -----  Contact: pt  States he's waiting on his ride, he will be 20 to 30 minutes late. Please call pt at [phone]. Thank you

## 2019-02-28 DIAGNOSIS — H40.1134 PRIMARY OPEN ANGLE GLAUCOMA OF BOTH EYES, INDETERMINATE STAGE: ICD-10-CM

## 2019-02-28 RX ORDER — LATANOPROST 50 UG/ML
1 SOLUTION/ DROPS OPHTHALMIC NIGHTLY
Qty: 1 BOTTLE | Refills: 4 | Status: SHIPPED | OUTPATIENT
Start: 2019-02-28 | End: 2019-05-16 | Stop reason: SDUPTHER

## 2019-03-01 ENCOUNTER — OFFICE VISIT (OUTPATIENT)
Dept: PODIATRY | Facility: CLINIC | Age: 64
End: 2019-03-01
Payer: MEDICARE

## 2019-03-01 VITALS
DIASTOLIC BLOOD PRESSURE: 89 MMHG | SYSTOLIC BLOOD PRESSURE: 137 MMHG | BODY MASS INDEX: 33.53 KG/M2 | HEIGHT: 65 IN | RESPIRATION RATE: 16 BRPM | HEART RATE: 94 BPM

## 2019-03-01 DIAGNOSIS — B35.1 DERMATOPHYTOSIS OF NAIL: ICD-10-CM

## 2019-03-01 DIAGNOSIS — E11.49 TYPE II DIABETES MELLITUS WITH NEUROLOGICAL MANIFESTATIONS: Primary | ICD-10-CM

## 2019-03-01 PROCEDURE — 99999 PR PBB SHADOW E&M-EST. PATIENT-LVL III: ICD-10-PCS | Mod: PBBFAC,HCNC,, | Performed by: PODIATRIST

## 2019-03-01 PROCEDURE — 3079F PR MOST RECENT DIASTOLIC BLOOD PRESSURE 80-89 MM HG: ICD-10-PCS | Mod: HCNC,CPTII,S$GLB, | Performed by: PODIATRIST

## 2019-03-01 PROCEDURE — 99999 PR PBB SHADOW E&M-EST. PATIENT-LVL III: CPT | Mod: PBBFAC,HCNC,, | Performed by: PODIATRIST

## 2019-03-01 PROCEDURE — 99213 PR OFFICE/OUTPT VISIT, EST, LEVL III, 20-29 MIN: ICD-10-PCS | Mod: 25,HCNC,S$GLB, | Performed by: PODIATRIST

## 2019-03-01 PROCEDURE — 3075F SYST BP GE 130 - 139MM HG: CPT | Mod: HCNC,CPTII,S$GLB, | Performed by: PODIATRIST

## 2019-03-01 PROCEDURE — 3008F BODY MASS INDEX DOCD: CPT | Mod: HCNC,CPTII,S$GLB, | Performed by: PODIATRIST

## 2019-03-01 PROCEDURE — 3045F PR MOST RECENT HEMOGLOBIN A1C LEVEL 7.0-9.0%: ICD-10-PCS | Mod: HCNC,CPTII,S$GLB, | Performed by: PODIATRIST

## 2019-03-01 PROCEDURE — 3008F PR BODY MASS INDEX (BMI) DOCUMENTED: ICD-10-PCS | Mod: HCNC,CPTII,S$GLB, | Performed by: PODIATRIST

## 2019-03-01 PROCEDURE — 3079F DIAST BP 80-89 MM HG: CPT | Mod: HCNC,CPTII,S$GLB, | Performed by: PODIATRIST

## 2019-03-01 PROCEDURE — 11721 PR DEBRIDEMENT OF NAILS, 6 OR MORE: ICD-10-PCS | Mod: Q9,HCNC,S$GLB, | Performed by: PODIATRIST

## 2019-03-01 PROCEDURE — 3045F PR MOST RECENT HEMOGLOBIN A1C LEVEL 7.0-9.0%: CPT | Mod: HCNC,CPTII,S$GLB, | Performed by: PODIATRIST

## 2019-03-01 PROCEDURE — 11721 DEBRIDE NAIL 6 OR MORE: CPT | Mod: Q9,HCNC,S$GLB, | Performed by: PODIATRIST

## 2019-03-01 PROCEDURE — 3075F PR MOST RECENT SYSTOLIC BLOOD PRESS GE 130-139MM HG: ICD-10-PCS | Mod: HCNC,CPTII,S$GLB, | Performed by: PODIATRIST

## 2019-03-01 PROCEDURE — 99213 OFFICE O/P EST LOW 20 MIN: CPT | Mod: 25,HCNC,S$GLB, | Performed by: PODIATRIST

## 2019-03-01 RX ORDER — HYDROCODONE BITARTRATE AND ACETAMINOPHEN 7.5; 325 MG/1; MG/1
1 TABLET ORAL EVERY 6 HOURS PRN
Qty: 28 TABLET | Refills: 0 | Status: SHIPPED | OUTPATIENT
Start: 2019-03-01 | End: 2019-03-14 | Stop reason: SDUPTHER

## 2019-03-01 NOTE — TELEPHONE ENCOUNTER
This is the last refill of this I am sending. Referral placed for pain management. He will need to establish care with pain management to take over narcotic prescriptions.

## 2019-03-01 NOTE — TELEPHONE ENCOUNTER
----- Message from Lucille Campbell sent at 3/1/2019  9:50 AM CST -----  Contact: Patient  Type:  Needs Medical Advice    Who Called: Patient  Symptoms (please be specific):   How long has patient had these symptoms:    Pharmacy name and phone #:    Would the patient rather a call back or a response via [facility]? call  Best Call Back Number: [phone]  Additional Information: Patient states that [facility] has not received his Hydrocodone refill.

## 2019-03-01 NOTE — TELEPHONE ENCOUNTER
----- Message from Peggy Hearn sent at 3/1/2019 10:03 AM CST -----  Contact: pt   Pt stated he's having problems getting his medication, the pharmacy needs to have his prescription sent via e-script, he can be reached at  [phone] Thanks

## 2019-03-07 ENCOUNTER — OFFICE VISIT (OUTPATIENT)
Dept: PAIN MEDICINE | Facility: CLINIC | Age: 64
End: 2019-03-07
Payer: MEDICARE

## 2019-03-07 VITALS
SYSTOLIC BLOOD PRESSURE: 140 MMHG | HEART RATE: 99 BPM | DIASTOLIC BLOOD PRESSURE: 95 MMHG | WEIGHT: 201 LBS | BODY MASS INDEX: 33.49 KG/M2 | RESPIRATION RATE: 18 BRPM | HEIGHT: 65 IN

## 2019-03-07 DIAGNOSIS — M54.16 LUMBAR RADICULOPATHY: ICD-10-CM

## 2019-03-07 DIAGNOSIS — M47.816 LUMBAR SPONDYLOSIS: ICD-10-CM

## 2019-03-07 DIAGNOSIS — M51.36 DDD (DEGENERATIVE DISC DISEASE), LUMBAR: ICD-10-CM

## 2019-03-07 DIAGNOSIS — M54.16 LUMBAR RADICULITIS: Primary | ICD-10-CM

## 2019-03-07 PROCEDURE — 99999 PR PBB SHADOW E&M-EST. PATIENT-LVL V: ICD-10-PCS | Mod: PBBFAC,,, | Performed by: PHYSICIAN ASSISTANT

## 2019-03-07 PROCEDURE — 99214 OFFICE O/P EST MOD 30 MIN: CPT | Mod: S$GLB,,, | Performed by: PHYSICIAN ASSISTANT

## 2019-03-07 PROCEDURE — 99999 PR PBB SHADOW E&M-EST. PATIENT-LVL V: CPT | Mod: PBBFAC,,, | Performed by: PHYSICIAN ASSISTANT

## 2019-03-07 PROCEDURE — 99215 OFFICE O/P EST HI 40 MIN: CPT | Mod: PBBFAC | Performed by: PHYSICIAN ASSISTANT

## 2019-03-07 PROCEDURE — 99214 PR OFFICE/OUTPT VISIT, EST, LEVL IV, 30-39 MIN: ICD-10-PCS | Mod: S$GLB,,, | Performed by: PHYSICIAN ASSISTANT

## 2019-03-07 NOTE — PROGRESS NOTES
Chief Pain Complaint:  Low-back Pain    Interval History:  Patient was last seen on 7-19-18 with Dr. Tsai. At that visit, the plan was to move forward with PITO to help with left leg pain.  The injection had been canceled and rescheduled more than once.  He now states that pain is in both legs, where before it was just the left leg.     History of Present Illness:   Crow Green is a 63 y.o. male  who is presenting with a chief complaint of Low-back Pain. The patient began experiencing this problem insidiously, and the pain has been gradually worsening over the past 6 month(s). The pain is described as throbbing, shooting, burning and electrical and is located in the bilateral lumbar spine. Pain is intermittent and lasts hours. The pain radiates to bilateral lower extremities. The patient rates his pain a 3 out of ten and interferes with activities of daily living a 8 out of ten. Pain is exacerbated by flexion of the lumbar spine, and is improved by rest. Patient reports no prior trauma, no prior spinal surgery     - pertinent negatives: No fever, No chills, No weight loss, No bladder dysfunction, No bowel dysfunction, No saddle anesthesia  - pertinent positives: right leg weakness    - medications, other therapies tried (physical therapy, injections):     >> NSAIDs, Tylenol, Norco, gabapentin and flexeril    >> Has previously undergone Physical Therapy    >> Has NOT previously undergone spinal injection/s      Imaging / Labs / Studies (reviewed on 3/7/2019):    Results for orders placed during the hospital encounter of 03/06/18   MRI Lumbar Spine Without Contrast    Narrative TECHNIQUE:  Multiplanar, multisequence MR images were acquired from the thoracolumbar junction to the sacrum without the administration of contrast.  COMPARISON:  None.  FINDINGS:  Vertebral body heights alignment are within normal limits.  There is moderate disc height loss at L5-S1.  Mild disc height loss also noted and L1-2.  There  is mild disc desiccation at L3-4 and L4-5. marrow signal is within normal limits with no evidence of fracture or marrow replacement process.  Conus appears within normal limits terminating at the level of the L1 level.  Cauda equina appears unremarkable  Paraspinous soft tissues appear within normal limits.  T12-L1:   No spinal canal or neuroforaminal narrowing.  L1-2: Small posterior central disc extrusion with superior migration.  No spinal canal or neuroforaminal narrowing.  L2-3:  No spinal canal or neuroforaminal narrowing.  L3-4:  No spinal canal or neuroforaminal narrowing.  L4-5: Mild generalized disc bulge and mild bilateral facet arthropathy.  Moderate left and mild right neuroforaminal narrowing.  No spinal canal stenosis.  L5-S1: Large central/left paracentral disc extrusion with slight inferior migration.  Moderate bilateral facet arthropathy.  Disc material contacts and displaces the descending left S1 nerve root.  Moderate to severe bilateral neural foraminal narrowing.    Impression 1. Posterior central/left paracentral disc extrusion at L5-S1 with mass effect on the descending left S1 nerve root and moderate to severe bilateral neural foraminal narrowing.  2. Other multilevel degenerative disc disease as above.       Results for orders placed during the hospital encounter of 02/10/18   X-Ray Lumbar Spine Ap And Lateral    Narrative Lumbar spine, 5 views  Clinical History:   Pain in the low back.  Findings:      The vertebral body heights and alignment are within normal limits. No acute fracture or subluxation.There is bilateral facet joint osteoarthritis at L4-L5 and L5-S1.  There is a very subtle dextroscoliosis of lumbar spine centered at L2-L3.    Impression      No acute fracture or subluxation. Bilateral facet joint osteoarthritis at L4-L5 and L5-S1.                         Review of Systems:  CONSTITUTIONAL: patient denies any fever, chills, or weight loss  SKIN: patient denies any rash or  "itching  RESPIRATORY: patient denies having any shortness of breath  GASTROINTESTINAL: patient denies having any diarrhea, constipation, or bowel incontinence  GENITOURINARY: patient denies having any abnormal bladder function    MUSCULOSKELETAL:  - patient complains of the above noted pain/s (see chief pain complaint)    NEUROLOGICAL:   - pain as above  - strength in Lower extremities is decreased, on the RIGHT  - sensation in Lower extremities is intact, BILATERALLY  - patient denies any loss of bowel or bladder control      PSYCHIATRIC: patient denies any change in mood    Other:  All other systems reviewed and are negative      Physical Exam:  Vitals:  BP (!) 140/95 (BP Location: Right arm, Patient Position: Sitting, BP Method: Medium (Automatic))   Pulse 99   Resp 18   Ht 5' 5" (1.651 m)   Wt 91.2 kg (201 lb)   BMI 33.45 kg/m²   (reviewed on 3/7/2019)    General: alert and oriented, in no apparent distress.  Gait: antalgic gait.  Skin: no rashes, no discoloration, no obvious lesions  HEENT: normocephalic, atraumatic. Pupils equal and round.  Cardiovascular: no significant peripheral edema present.  Respiratory: without use of accessory muscles of respiration.    Musculoskeletal - Lumbar Spine:  - Pain on flexion of lumbar spine: Present  - Pain on extension of lumbar spine: Present  - Lumbar facet loading: Absent   - TTP over the lumbar facet joints: Absent  - TTP over the lumbar paraspinals: Present  - TTP over the SI joints:  Absent   - TTP over GT bursa: Absent   - Straight Leg Raise: Present  - YONIS: Negative    Neuro - Lower Extremities:  - RLE Strength:     >> 5/5 strength with right hip flexion/ extension    >> 5/5 strength with right knee flexion/ extension    >> 5/5 strength in right ankle with plantar and dorsiflexion  - LLE Strength:     >> 5/5 strength with left hip flexion/ extension    >> 5/5 strength with knee flexion extension on the left     >> 5/5 strength in left ankle with plantar and " dorsiflexion  - BLE Strength: R/L: HF: 5/5, HE: 5/5, KF: 5/5; KE: 5/5; FE: 5/5; FF: 5/5  - Extremity Reflexes: Brisk and symmetric throughout  - Sensory: Sensation to light touch intact bilaterally      Psych:  Mood and affect is appropriate          Assessment:  Crow Green is a 63 y.o. year old male who is presenting with    Encounter Diagnoses   Name Primary?    Lumbar radiculitis Yes    Lumbar spondylosis     Lumbar radiculopathy     DDD (degenerative disc disease), lumbar        Plan:  1. Interventional:   - Re-schedule patient for Left L4-5, L5-S1 TFESI with local. Orders for procedure have been placed today.  Kimmie will call to schedule procedure on Monday (if patient decides to do so).  She will give more specific procedure instructions at the time of that call.    - Patient scheduled to see Dr. Chandra tomorrow for evaluation.     2. Pharmacologic:   - Continue Gabapentin 600 gm Po TID.   - Patient requests refill of Norco.  He was given a Rx of Norco 10/325 mg Po BID PRN (35 tabs) as a bridge to injection at his last appt in July 2018 (8 months ago).  He requests a refill today.  I inform him that our plan is to provide interventional treatments, and if he would rather establish care with a facility that may be able to provide medication management, he may do so.  He was given a list of other providers in the area today.    3. Rehabilitative: Encouraged regular exercise.    4. Diagnostic: None for now.    5. Follow up: None.    - I discussed the risks, benefits, and alternatives to potential treatment options. All questions and concerns were fully addressed today in clinic. Dr. Tsai was consulted regarding the patient plan and agrees.

## 2019-03-08 ENCOUNTER — OFFICE VISIT (OUTPATIENT)
Dept: NEUROSURGERY | Facility: CLINIC | Age: 64
End: 2019-03-08
Payer: MEDICARE

## 2019-03-08 VITALS
DIASTOLIC BLOOD PRESSURE: 80 MMHG | HEIGHT: 65 IN | SYSTOLIC BLOOD PRESSURE: 158 MMHG | WEIGHT: 182 LBS | BODY MASS INDEX: 30.32 KG/M2 | HEART RATE: 92 BPM

## 2019-03-08 DIAGNOSIS — M47.816 LUMBAR SPONDYLOSIS: Primary | ICD-10-CM

## 2019-03-08 PROCEDURE — 99204 OFFICE O/P NEW MOD 45 MIN: CPT | Mod: HCNC,S$GLB,, | Performed by: NEUROLOGICAL SURGERY

## 2019-03-08 PROCEDURE — 99999 PR PBB SHADOW E&M-EST. PATIENT-LVL V: ICD-10-PCS | Mod: PBBFAC,HCNC,, | Performed by: NEUROLOGICAL SURGERY

## 2019-03-08 PROCEDURE — 99999 PR PBB SHADOW E&M-EST. PATIENT-LVL V: CPT | Mod: PBBFAC,HCNC,, | Performed by: NEUROLOGICAL SURGERY

## 2019-03-08 PROCEDURE — 99215 OFFICE O/P EST HI 40 MIN: CPT | Mod: PBBFAC,PO | Performed by: NEUROLOGICAL SURGERY

## 2019-03-08 PROCEDURE — 99204 PR OFFICE/OUTPT VISIT, NEW, LEVL IV, 45-59 MIN: ICD-10-PCS | Mod: HCNC,S$GLB,, | Performed by: NEUROLOGICAL SURGERY

## 2019-03-08 NOTE — PROGRESS NOTES
Subjective:      Patient ID: Crow Green is a 63 y.o. male.    Chief Complaint: Lumbar Spine Pain (L-Spine)     is here today for evaluation as a new patient  Has had the symptoms for over a year getting worse over the past several months  He has had ongoing issues with ambulation and pain in his back.  Radiates down his legs into his feet with associated numbness and tingling.  Pain rated 7/10 in the back today.  Radiates equally into bilateral lower extremities with associated numbness and tingling.  Currently uses a rolling walker for stability purposes  Pain worse with activity and better with rest.  Has been to pain management for this in the past.  He was recommended epidural steroid injections however he was not able to schedule secondary to being on blood thinning medications.  Has also tried physical therapy however not recently  Currently takes hydrocodone-also takes Eliquis as well as aspirin      Review of Systems   Constitutional: Negative for activity change, appetite change and chills.   HENT: Negative for hearing loss, sore throat and tinnitus.    Eyes: Negative for pain, discharge and itching.   Cardiovascular: Negative for chest pain.   Gastrointestinal: Negative for abdominal pain.   Endocrine: Negative for cold intolerance and heat intolerance.   Genitourinary: Negative for difficulty urinating and dysuria.   Musculoskeletal: Positive for back pain and gait problem.   Allergic/Immunologic: Negative for environmental allergies.   Neurological: Positive for weakness. Negative for dizziness, tremors, light-headedness and headaches.   Hematological: Negative for adenopathy.   Psychiatric/Behavioral: Negative for agitation, behavioral problems and confusion.         Objective:       Physical Exam:  Nursing note and vitals reviewed.    Constitutional: He appears well-developed and well-nourished. No distress.     Eyes: Pupils are equal, round, and reactive to light. EOM are  normal.     Cardiovascular: Intact distal pulses.     Abdominal: Soft.     Psych/Behavior: He is alert. He is oriented to person, place, and time. He has a normal mood and affect.     Neurological:        DTRs: Tricep reflexes are 2+ on the right side and 2+ on the left side. Bicep reflexes are 2+ on the right side and 2+ on the left side. Brachioradialis reflexes are 2+ on the right side and 2+ on the left side. Achilles reflexes are 2+ on the right side and 2+ on the left side.     General    Nursing note and vitals reviewed.  Constitutional: He is oriented to person, place, and time. He appears well-developed and well-nourished. No distress.   HENT:   Head: Normocephalic and atraumatic.   Nose: Nose normal.   Eyes: EOM are normal. Pupils are equal, round, and reactive to light.   Neck: Normal range of motion. Neck supple.   Cardiovascular: Intact distal pulses.    Pulmonary/Chest: Effort normal. No respiratory distress.   Abdominal: Soft. He exhibits no distension.   Neurological: He is alert and oriented to person, place, and time. No cranial nerve deficit.   Psychiatric: He has a normal mood and affect. His behavior is normal.         Right Ankle/Foot Exam     Tests   Heel Walk: unable to perform  Tiptoe Walk: unable to perform  Single Heel Rise: able to perform  Double Heel Rise: unable to perform double heel rise    Left Ankle/Foot Exam     Tests   Heel Walk: unable to perform  Tiptoe Walk: unable to perform  Single Heel Rise: able to perform  Double Heel Rise: able to perform  Back (L-Spine & T-Spine) / Neck (C-Spine) Exam     Tenderness Posterior midline palpation reveals tenderness of the Upper L-Spine and Lower L-Spine. Right paramedian tenderness of the Lower L-Spine, Sacrum and Upper L-Spine. Left paramedian tenderness of the Lower L-Spine, Sacrum and Upper L-Spine.     Back (L-Spine & T-Spine) Range of Motion   Lateral bend right: abnormal   Lateral bend left: abnormal     Neck (C-Spine) Range of  Motion   Flexion:     Normal  Extension: Normal  Right Lateral Bend: normal  Left Lateral Bend: normal  Right Rotation: normal  Left Rotation: normal    Spinal Sensation   Right Side Sensation  C-Spine Level: normal   L-Spine Level: normal  Left Side Sensation  C-Spine Level: normal  L-Spine Level: normal    Back (L-Spine & T-Spine) Tests   Right Side Tests  Squat Test: unable to perform  Left Side Tests  Squat: unable to perform    Neck (C-Spine) Tests   Spurling's Test   Left:  Negative  Right: negative      Muscle Strength   Right Upper Extremity   Biceps: 5/5/5   Deltoid:  5/5  Triceps:  5/5  Wrist extension: 5/5/5   Wrist flexion: 5/5/5   Finger Flexors:  5/5  Finger Extensors:  5/5  Left Upper Extremity  Biceps: 5/5/5   Deltoid:  5/5  Triceps:  5/5  Wrist extension: 5/5/5   Wrist flexion: 5/5/5   Finger Flexors:  5/5  Finger Extensors:  5/5  Right Lower Extremity   Hip Abduction: 5/5   Hip Flexion: 5/5   Hip Extensors: 5/5  Quadriceps:  5/5   Hamstrin/5   Anterior tibial:  5/5/5  Gastrocsoleus:  5/5/5  EHL:  5/5  Left Lower Extremity   Hip Abduction: 5/5   Hip Flexion: 5/5   Hip Extensors: 5/5  Quadriceps:  5/5   Hamstrin/5   Anterior tibial:  5/5/5   Gastrocsoleus:  5/5/5  EHL:  5/5    Reflexes     Left Side  Biceps:  2+  Triceps:  2+  Brachioradialis:  2+  Quadriceps:  2+  Post Tibial:  2+  Achilles:  2+  Left Euceda's Sign:  Absent    Right Side   Biceps:  2+  Triceps:  2+  Brachioradialis:  2+  Quadriceps:  2+  Post Tibial:  2+  Achilles:  2+  Right Euceda's Sign:  absent    I  reviewed all pertinent imaging regarding this case.  He has an MRI of the lumbar spine from 2018  At L5 and S1 there is a very large posterior herniated disc central and eccentric to the left  Assessment:     1. Lumbar spondylosis      Plan:     Lumbar spondylosis  -     MRI Lumbar Spine Without Contrast; Future; Expected date: 2019     Patient had a very large herniated disc approximately a year ago.  We  will repeat the MRI to make sure that there are any new findings which could be causing his symptoms.  Certainly the disc from a year ago could be causing his symptoms currently.  Based on the size of the herniated disc he would potentially be a surgical candidate however he has a number of other medical problems.  Will follow the patient back up after imaging is complete    Thank you for the referral   Please call with any questions    Eamon Chandra MD  Neurosurgery

## 2019-03-08 NOTE — LETTER
March 8, 2019      Mateo Lambert,   [id number] [address] [address]           [facility] Neurosurgery  [address] [place] [address] Dr Shakira MARY [id number]  Phone: [phone]  Fax: [phone]          Patient: Crow Green   MR Number: [id number]   YOB: 1955   Date of Visit: 3/8/2019       Dear Dr. Mateo Lambert:    Thank you for referring Crow Green to me for evaluation. Attached you will find relevant portions of my assessment and plan of care.    If you have questions, please do not hesitate to call me. I look forward to following Crow Green along with you.    Sincerely,    Eamon Chandra MD    Enclosure  CC:  No Recipients    If you would like to receive this communication electronically, please contact [email or web address] or [phone] to request more information on [employer] Link access.    For providers and/or their staff who would like to refer a patient to [facility], please contact us through our one-stop-shop provider referral line, [facility], at [phone].    If you feel you have received this communication in error or would no longer like to receive these types of communications, please e-mail [email or web address]

## 2019-03-11 NOTE — PROGRESS NOTES
Subjective:     Patient ID: Crow Green is a 63 y.o. male.    Chief Complaint: Routine Foot Care (c/o bilateral tingling, wears tennis shoes with socks, diabetic Pt, last seen on 12/10/18 with PCP Dr. Lambert)    Crow is a 63 y.o. male who presents to the clinic for evaluation and treatment of high risk feet. Crow has a past medical history of Acute kidney injury, Arthritis, Asthma, Atrial fibrillation, CHF (congestive heart failure), Depression, Diabetes mellitus, type 2 (Diagnosed in 2000), Elevated PSA, Hypertension, and Sleep apnea. The patient's chief complaint is long, thick toenails.  This patient has documented high risk feet requiring routine maintenance secondary to diabetes mellitis and those secondary complications of diabetes, as mentioned..    PCP: Mateo Lambert, DO    Date Last Seen by PCP: 12/10/18    Current shoe gear:  Affected Foot: Tennis shoes     Unaffected Foot: Tennis shoes    Hemoglobin A1C   Date Value Ref Range Status   12/10/2018 8.7 (H) 4.0 - 5.6 % Final     Comment:     ADA Screening Guidelines:  5.7-6.4%  Consistent with prediabetes  >or=6.5%  Consistent with diabetes  High levels of fetal hemoglobin interfere with the HbA1C  assay. Heterozygous hemoglobin variants (HbS, HgC, etc)do  not significantly interfere with this assay.   However, presence of multiple variants may affect accuracy.     09/13/2018 8.0 (H) 4.0 - 5.6 % Final     Comment:     ADA Screening Guidelines:  5.7-6.4%  Consistent with prediabetes  >or=6.5%  Consistent with diabetes  High levels of fetal hemoglobin interfere with the HbA1C  assay. Heterozygous hemoglobin variants (HbS, HgC, etc)do  not significantly interfere with this assay.   However, presence of multiple variants may affect accuracy.     02/27/2018 10.2 (H) 4.0 - 5.6 % Final     Comment:     According to ADA guidelines, hemoglobin A1c <7.0% represents  optimal control in non-pregnant diabetic patients. Different  metrics may apply to  specific patient populations.   Standards of Medical Care in Diabetes-2016.  For the purpose of screening for the presence of diabetes:  <5.7%     Consistent with the absence of diabetes  5.7-6.4%  Consistent with increasing risk for diabetes   (prediabetes)  >or=6.5%  Consistent with diabetes  Currently, no consensus exists for use of hemoglobin A1c  for diagnosis of diabetes for children.  This Hemoglobin A1c assay has significant interference with fetal   hemoglobin   (HbF). The results are invalid for patients with abnormal amounts of   HbF,   including those with known Hereditary Persistence   of Fetal Hemoglobin. Heterozygous hemoglobin variants (HbAS, HbAC,   HbAD, HbAE, HbA2) do not significantly interfere with this assay;   however, presence of multiple variants in a sample may impact the %   interference.             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, with long-term current use of insulin    SANDRITA on CPAP    Asthma    Psychophysiological insomnia    Nightmares    Sleep related gastroesophageal reflux disease    Inadequate sleep hygiene    PAF (paroxysmal atrial fibrillation)    At risk for medication noncompliance    Obesity (BMI 30.0-34.9)    Indeterminate stage chronic open angle glaucoma    Elevated troponin    Right hip pain    Gait instability    Chronic right shoulder pain    Arthritis    Left sided sciatica    Dyspnea on exertion    Osteoarthritis of spine with radiculopathy, lumbar region    HNP (herniated nucleus pulposus), lumbar    Gastroesophageal reflux  disease without esophagitis       Medication List with Changes/Refills   Current Medications    ALCOHOL ANTISEPTIC PADS (ALCOHOL PREP PADS TOP)        AMLODIPINE (NORVASC) 10 MG TABLET    TAKE 1 TABLET ONE TIME DAILY    AMMONIUM LACTATE 12 % CREA    Apply 1 Act topically 2 (two) times daily.    ASPIRIN (ECOTRIN) 81 MG EC TABLET    Take 1 tablet (81 mg total) by mouth once daily.    ATORVASTATIN (LIPITOR) 40 MG TABLET    TAKE 1 TABLET EVERY EVENING    BACLOFEN (LIORESAL) 10 MG TABLET    Take 1 tablet (10 mg total) by mouth once daily.    ELIQUIS 5 MG TAB        FLUTICASONE-SALMETEROL 250-50 MCG/DOSE (ADVAIR DISKUS) 250-50 MCG/DOSE DISKUS INHALER    Inhale 1 puff into the lungs 2 (two) times daily. Controller    FUROSEMIDE (LASIX) 40 MG TABLET    Take 2 tablets (80 mg total) by mouth every morning AND 1 tablet (40 mg total) every evening.    GABAPENTIN (NEURONTIN) 600 MG TABLET    Take 1 tablet (600 mg total) by mouth 3 (three) times daily.    GLIMEPIRIDE (AMARYL) 2 MG TABLET    Take 1 tablet (2 mg total) by mouth before breakfast.    HYDROCODONE-ACETAMINOPHEN (NORCO) 7.5-325 MG PER TABLET    Take 1 tablet by mouth every 6 (six) hours as needed for Pain.    INHALATION SPACING DEVICE    Use as directed for inhalation.    INHALATION SPACING DEVICE    Use as directed for inhalation.    INSULIN GLARGINE (LANTUS SOLOSTAR) 100 UNIT/ML (3 ML) INPN PEN    Inject 50 Units into the skin every evening.    INSULIN SYRINGE-NEEDLE U-100 1 ML 31 GAUGE X 5/16 SYRG        IPRATROPIUM-ALBUTEROL (COMBIVENT)  MCG/ACTUATION INHALER    Inhale 1 puff into the lungs 4 (four) times daily. Rescue    ISOSORBIDE MONONITRATE (IMDUR) 60 MG 24 HR TABLET    Take 1 tablet (60 mg total) by mouth once daily.    LATANOPROST 0.005 % OPHTHALMIC SOLUTION    Place 1 drop into both eyes every evening.    LISINOPRIL 10 MG TABLET    Take 1 tablet (10 mg total) by mouth once daily.    METFORMIN (GLUCOPHAGE) 1000 MG TABLET    Take 1 tablet (1,000 mg total)  "by mouth 2 (two) times daily with meals.    METOLAZONE (ZAROXOLYN) 2.5 MG TABLET    Take 1 tablet (2.5 mg total) by mouth once daily.    METOPROLOL SUCCINATE (TOPROL-XL) 50 MG 24 HR TABLET    TAKE 1 TABLET EVERY DAY    NITROGLYCERIN (NITROSTAT) 0.3 MG SL TABLET    PLACE 1 TABLET UNDER THE TONGUE EVERY 5 MINUTES AS NEEDED FOR CHEST PAIN, NO MORE THAN 3 TABLETS IN ONE DAY    NOVOLOG FLEXPEN 100 UNIT/ML INPN PEN    ADMINISTER 20 UNITS UNDER THE SKIN THREE TIMES DAILY WITH MEALS    PANTOPRAZOLE (PROTONIX) 40 MG TABLET    Take 1 tablet (40 mg total) by mouth once daily.    PEN NEEDLE, DIABETIC (BD INSULIN PEN NEEDLE UF SHORT) 31 GAUGE X 5/16" NDLE    Inject 1 each into the skin 3 (three) times daily.    SERTRALINE (ZOLOFT) 50 MG TABLET    TAKE 1 TABLET ONE TIME DAILY    TIOTROPIUM (SPIRIVA WITH HANDIHALER) 18 MCG INHALATION CAPSULE    Inhale 1 capsule (18 mcg total) into the lungs once daily. Controller    TRAZODONE (DESYREL) 150 MG TABLET    TAKE 1 TABLET EVERY NIGHT    TRUE METRIX AIR GLUCOSE METER KIT    TEST FOUR TIMES DAILY BEFORE MEALS  AND EVERY NIGHT    VENTOLIN HFA 90 MCG/ACTUATION INHALER    Inhale 2 puffs into the lungs every 4 (four) hours as needed for Wheezing.       Review of patient's allergies indicates:  No Known Allergies    Past Surgical History:   Procedure Laterality Date    BIOPSY-ARTERY-TEMPORAL Left 9/1/2017    [facility] at [facility]    CHOLECYSTECTOMY      CHOLECYSTECTOMY-LAPAROSCOPIC N/A 3/17/2017    Performed by Louis O. Jeansonne IV, MD at [facility] OR       Family History   Problem Relation Age of Onset    Glaucoma Mother     Cataracts Mother     Cataracts Father     Glaucoma Sister     Cataracts Sister     Glaucoma Maternal Aunt     No Known Problems Brother     No Known Problems Daughter     No Known Problems Son     Prostate cancer Neg Hx        Social History     Socioeconomic History    Marital status: Legally      Spouse name: Not on file    Number " "of children: 5    Years of education: Not on file    Highest education level: Not on file   Social Needs    Financial resource strain: Not on file    Food insecurity - worry: Not on file    Food insecurity - inability: Not on file    Transportation needs - medical: Not on file    Transportation needs - non-medical: Not on file   Occupational History    Occupation: disabled   Tobacco Use    Smoking status: Never Smoker    Smokeless tobacco: Never Used   Substance and Sexual Activity    Alcohol use: No    Drug use: No    Sexual activity: Not Currently   Other Topics Concern    Not on file   Social History Narrative    Not on file       Vitals:    03/01/19 0923   BP: 137/89   Pulse: 94   Resp: 16   Height: 5' 5" (1.651 m)   PainSc: 0-No pain   PainLoc: Foot       Hemoglobin A1C   Date Value Ref Range Status   12/10/2018 8.7 (H) 4.0 - 5.6 % Final     Comment:     ADA Screening Guidelines:  5.7-6.4%  Consistent with prediabetes  >or=6.5%  Consistent with diabetes  High levels of fetal hemoglobin interfere with the HbA1C  assay. Heterozygous hemoglobin variants (HbS, HgC, etc)do  not significantly interfere with this assay.   However, presence of multiple variants may affect accuracy.     09/13/2018 8.0 (H) 4.0 - 5.6 % Final     Comment:     ADA Screening Guidelines:  5.7-6.4%  Consistent with prediabetes  >or=6.5%  Consistent with diabetes  High levels of fetal hemoglobin interfere with the HbA1C  assay. Heterozygous hemoglobin variants (HbS, HgC, etc)do  not significantly interfere with this assay.   However, presence of multiple variants may affect accuracy.     02/27/2018 10.2 (H) 4.0 - 5.6 % Final     Comment:     According to ADA guidelines, hemoglobin A1c <7.0% represents  optimal control in non-pregnant diabetic patients. Different  metrics may apply to specific patient populations.   Standards of Medical Care in Diabetes-2016.  For the purpose of screening for the presence of diabetes:  <5.7%     " Consistent with the absence of diabetes  5.7-6.4%  Consistent with increasing risk for diabetes   (prediabetes)  >or=6.5%  Consistent with diabetes  Currently, no consensus exists for use of hemoglobin A1c  for diagnosis of diabetes for children.  This Hemoglobin A1c assay has significant interference with fetal   hemoglobin   (HbF). The results are invalid for patients with abnormal amounts of   HbF,   including those with known Hereditary Persistence   of Fetal Hemoglobin. Heterozygous hemoglobin variants (HbAS, HbAC,   HbAD, HbAE, HbA2) do not significantly interfere with this assay;   however, presence of multiple variants in a sample may impact the %   interference.         Review of Systems   Constitutional: Negative for chills and fever.   Respiratory: Negative for shortness of breath.    Cardiovascular: Negative for chest pain, palpitations, orthopnea, claudication and leg swelling.   Gastrointestinal: Negative for diarrhea, nausea and vomiting.   Musculoskeletal: Negative for joint pain.   Skin: Negative for rash.   Neurological: Positive for tingling and sensory change. Negative for dizziness, focal weakness and weakness.   Psychiatric/Behavioral: Negative.          Objective:      PHYSICAL EXAM: Apperance: Alert and orient in no distress,well developed, and with good attention to grooming and body habits  Patient presents ambulating in tennis shoes.   LOWER EXTREMITY EXAM:  VASCULAR: Dorsalis pedis pulses 2/4 bilateral and Posterior Tibial pulses 2/4 bilateral. Capillary fill time <3 seconds bilateral. No edema observed bilateral. Varicosities absent bilateral. Skin temperature of the lower extremities is warm to warm, proximal to distal. Hair growth dim bilateral.  DERMATOLOGICAL: No skin rashes, subcutaneous nodules, lesions, or ulcers observed bilateral. Nails 1,2,3,4,5 bilateral elongated, thickened, and discolored with subungual debris. Webspaces 1,2,3,4 clean, dry and without evidence of break in  skin integrity bilateral. Dry and scaly skin noted plantarly bilateral.   NEUROLOGICAL: Light touch, sharp-dull, proprioception all present and equal bilaterally.  Vibratory sensation diminished at bilateral hallux and navicular. Protective sensation absent at sites as tested with a Webster-Kimmie 5.07 monofilament.   MUSCULOSKELETAL: Muscle strength is 5/5 for foot inverters, everters, plantarflexors, and dorsiflexors. Muscle tone is normal.         Assessment:       Encounter Diagnoses   Name Primary?    Type II diabetes mellitus with neurological manifestations Yes    Dermatophytosis of nail          Plan:   Type II diabetes mellitus with neurological manifestations    Dermatophytosis of nail      I counseled the patient on his conditions, regarding findings of my examination, my impressions, and usual treatment plan.   Greater than 50% of this visit spent on counseling and coordination of care.  Greater than 15 minutes of a 20 minute appointment spent on education about the diabetic foot, neuropathy, and prevention of limb loss.  Shoe inspection. Diabetic Foot Education. Patient reminded of the importance of good nutrition and blood sugar control to help prevent podiatric complications of diabetes. Patient instructed on proper foot hygeine. We discussed wearing proper shoe gear, daily foot inspections, never walking without protective shoe gear, never putting sharp instruments to feet.    With patient's permission, nails 1-5 bilateral were debrided/trimmed in length and thickness aggressively to their soft tissue attachment mechanically and with electric , removing all offending nail and debris. Patient relates relief following the procedure.  Patient  will continue to monitor the areas daily, inspect feet, wear protective shoe gear when ambulatory, moisturizer to maintain skin integrity. Patient reminded of the importance of good nutrition and blood sugar control to help prevent podiatric  complications of diabetes.  Patient to return 3 months or sooner if needed.              Barb Veras DPM  [facility]

## 2019-03-12 ENCOUNTER — PATIENT OUTREACH (OUTPATIENT)
Dept: ADMINISTRATIVE | Facility: HOSPITAL | Age: 64
End: 2019-03-12

## 2019-03-14 RX ORDER — HYDROCODONE BITARTRATE AND ACETAMINOPHEN 7.5; 325 MG/1; MG/1
1 TABLET ORAL EVERY 6 HOURS PRN
Qty: 28 TABLET | Refills: 0 | Status: SHIPPED | OUTPATIENT
Start: 2019-03-14 | End: 2019-03-28

## 2019-03-14 NOTE — TELEPHONE ENCOUNTER
----- Message from Harriett Vela sent at 3/14/2019 10:44 AM CDT -----  Contact: [phone]  Pt would like refills called in on Rx medication Hydrocodone.  Please call back at [phone].  x-             [facility]:  [id number] [place] - [address] AT [place] & [facility]  [address] [place] [address]  Phone: [phone] Fax: [phone]

## 2019-03-14 NOTE — TELEPHONE ENCOUNTER
Pt is requesting refill on [place]   Next appt 03/20/2019. Pt stated he is out of pain medication. Tried to reschedule for earlier appt. But no morning appts for transportation

## 2019-03-19 RX ORDER — INSULIN ASPART 100 [IU]/ML
INJECTION, SOLUTION INTRAVENOUS; SUBCUTANEOUS
Qty: 15 ML | Refills: 0 | Status: SHIPPED | OUTPATIENT
Start: 2019-03-19 | End: 2019-03-20 | Stop reason: SDUPTHER

## 2019-03-20 ENCOUNTER — LAB VISIT (OUTPATIENT)
Dept: LAB | Facility: HOSPITAL | Age: 64
End: 2019-03-20
Attending: FAMILY MEDICINE
Payer: MEDICARE

## 2019-03-20 ENCOUNTER — OFFICE VISIT (OUTPATIENT)
Dept: INTERNAL MEDICINE | Facility: CLINIC | Age: 64
End: 2019-03-20
Payer: MEDICARE

## 2019-03-20 VITALS
BODY MASS INDEX: 32.44 KG/M2 | TEMPERATURE: 96 F | SYSTOLIC BLOOD PRESSURE: 124 MMHG | RESPIRATION RATE: 16 BRPM | OXYGEN SATURATION: 96 % | WEIGHT: 194.69 LBS | DIASTOLIC BLOOD PRESSURE: 80 MMHG | HEIGHT: 65 IN | HEART RATE: 52 BPM

## 2019-03-20 DIAGNOSIS — Z79.4 TYPE 2 DIABETES MELLITUS WITH MILD NONPROLIFERATIVE RETINOPATHY, WITH LONG-TERM CURRENT USE OF INSULIN, MACULAR EDEMA PRESENCE UNSPECIFIED, UNSPECIFIED LATERALITY: ICD-10-CM

## 2019-03-20 DIAGNOSIS — M47.26 OSTEOARTHRITIS OF SPINE WITH RADICULOPATHY, LUMBAR REGION: ICD-10-CM

## 2019-03-20 DIAGNOSIS — E11.3299 TYPE 2 DIABETES MELLITUS WITH MILD NONPROLIFERATIVE RETINOPATHY, WITH LONG-TERM CURRENT USE OF INSULIN, MACULAR EDEMA PRESENCE UNSPECIFIED, UNSPECIFIED LATERALITY: ICD-10-CM

## 2019-03-20 DIAGNOSIS — M51.26 HNP (HERNIATED NUCLEUS PULPOSUS), LUMBAR: ICD-10-CM

## 2019-03-20 DIAGNOSIS — I10 ESSENTIAL (PRIMARY) HYPERTENSION: ICD-10-CM

## 2019-03-20 LAB
ALBUMIN SERPL BCP-MCNC: 3.5 G/DL
ALBUMIN/CREAT UR: 102.1 UG/MG
ALP SERPL-CCNC: 65 U/L
ALT SERPL W/O P-5'-P-CCNC: 25 U/L
ANION GAP SERPL CALC-SCNC: 10 MMOL/L
AST SERPL-CCNC: 20 U/L
BILIRUB SERPL-MCNC: 0.3 MG/DL
BUN SERPL-MCNC: 24 MG/DL
CALCIUM SERPL-MCNC: 9.8 MG/DL
CHLORIDE SERPL-SCNC: 92 MMOL/L
CO2 SERPL-SCNC: 35 MMOL/L
CREAT SERPL-MCNC: 1.3 MG/DL
CREAT UR-MCNC: 234 MG/DL
EST. GFR  (AFRICAN AMERICAN): >60 ML/MIN/1.73 M^2
EST. GFR  (NON AFRICAN AMERICAN): 58.9 ML/MIN/1.73 M^2
ESTIMATED AVG GLUCOSE: 263 MG/DL
GLUCOSE SERPL-MCNC: 215 MG/DL
HBA1C MFR BLD HPLC: 10.8 %
MICROALBUMIN UR DL<=1MG/L-MCNC: 239 UG/ML
POTASSIUM SERPL-SCNC: 3.9 MMOL/L
PROT SERPL-MCNC: 8.1 G/DL
SODIUM SERPL-SCNC: 137 MMOL/L

## 2019-03-20 PROCEDURE — 3008F BODY MASS INDEX DOCD: CPT | Mod: HCNC,CPTII,S$GLB, | Performed by: FAMILY MEDICINE

## 2019-03-20 PROCEDURE — 3079F DIAST BP 80-89 MM HG: CPT | Mod: HCNC,CPTII,S$GLB, | Performed by: FAMILY MEDICINE

## 2019-03-20 PROCEDURE — 36415 COLL VENOUS BLD VENIPUNCTURE: CPT | Mod: HCNC,PO

## 2019-03-20 PROCEDURE — 3074F SYST BP LT 130 MM HG: CPT | Mod: HCNC,CPTII,S$GLB, | Performed by: FAMILY MEDICINE

## 2019-03-20 PROCEDURE — 3008F PR BODY MASS INDEX (BMI) DOCUMENTED: ICD-10-PCS | Mod: HCNC,CPTII,S$GLB, | Performed by: FAMILY MEDICINE

## 2019-03-20 PROCEDURE — 99214 PR OFFICE/OUTPT VISIT, EST, LEVL IV, 30-39 MIN: ICD-10-PCS | Mod: HCNC,S$GLB,, | Performed by: FAMILY MEDICINE

## 2019-03-20 PROCEDURE — 3045F PR MOST RECENT HEMOGLOBIN A1C LEVEL 7.0-9.0%: ICD-10-PCS | Mod: HCNC,CPTII,S$GLB, | Performed by: FAMILY MEDICINE

## 2019-03-20 PROCEDURE — 99999 PR PBB SHADOW E&M-EST. PATIENT-LVL III: ICD-10-PCS | Mod: PBBFAC,HCNC,, | Performed by: FAMILY MEDICINE

## 2019-03-20 PROCEDURE — 80053 COMPREHEN METABOLIC PANEL: CPT | Mod: HCNC,PO

## 2019-03-20 PROCEDURE — 99214 OFFICE O/P EST MOD 30 MIN: CPT | Mod: HCNC,S$GLB,, | Performed by: FAMILY MEDICINE

## 2019-03-20 PROCEDURE — 3045F PR MOST RECENT HEMOGLOBIN A1C LEVEL 7.0-9.0%: CPT | Mod: HCNC,CPTII,S$GLB, | Performed by: FAMILY MEDICINE

## 2019-03-20 PROCEDURE — 3074F PR MOST RECENT SYSTOLIC BLOOD PRESSURE < 130 MM HG: ICD-10-PCS | Mod: HCNC,CPTII,S$GLB, | Performed by: FAMILY MEDICINE

## 2019-03-20 PROCEDURE — 82043 UR ALBUMIN QUANTITATIVE: CPT | Mod: HCNC

## 2019-03-20 PROCEDURE — 83036 HEMOGLOBIN GLYCOSYLATED A1C: CPT | Mod: HCNC

## 2019-03-20 PROCEDURE — 99999 PR PBB SHADOW E&M-EST. PATIENT-LVL III: CPT | Mod: PBBFAC,HCNC,, | Performed by: FAMILY MEDICINE

## 2019-03-20 PROCEDURE — 3079F PR MOST RECENT DIASTOLIC BLOOD PRESSURE 80-89 MM HG: ICD-10-PCS | Mod: HCNC,CPTII,S$GLB, | Performed by: FAMILY MEDICINE

## 2019-03-20 RX ORDER — INSULIN ASPART 100 [IU]/ML
INJECTION, SOLUTION INTRAVENOUS; SUBCUTANEOUS
Qty: 15 ML | Refills: 0 | Status: SHIPPED | OUTPATIENT
Start: 2019-03-20 | End: 2019-05-09 | Stop reason: SDUPTHER

## 2019-03-20 RX ORDER — GABAPENTIN 800 MG/1
800 TABLET ORAL 3 TIMES DAILY
Qty: 270 TABLET | Refills: 0 | Status: SHIPPED | OUTPATIENT
Start: 2019-03-20 | End: 2019-05-29 | Stop reason: SDUPTHER

## 2019-03-20 RX ORDER — CLOTRIMAZOLE 1 %
CREAM (GRAM) TOPICAL 2 TIMES DAILY
Qty: 60 G | Refills: 0 | Status: SHIPPED | OUTPATIENT
Start: 2019-03-20 | End: 2019-06-27

## 2019-03-20 RX ORDER — INSULIN GLARGINE 100 [IU]/ML
70 INJECTION, SOLUTION SUBCUTANEOUS NIGHTLY
Qty: 63 ML | Refills: 3 | Status: SHIPPED | OUTPATIENT
Start: 2019-03-20 | End: 2019-06-27 | Stop reason: SDUPTHER

## 2019-03-20 NOTE — ASSESSMENT & PLAN NOTE
Unfortunately continues to require Norco and high doses of gabapentin for some relief.  I urged him to keep follow-up with surgery and Pain Management for definitive therapy.  Reminded him that we will not continue to prescribe Norco for ever.

## 2019-03-20 NOTE — PROGRESS NOTES
Subjective:       Patient ID: Crow Green is a 63 y.o. male.    Chief Complaint: Follow-up; Hypertension; and Diabetes    HPI  Here today to follow-up on hypertension and diabetes.  Has been taking his medication consistently and trying to eat better.  However, he did say that he is often skipping meals earlier in the day because he does not have much of an appetite and says that sometimes he feels nauseous if he eats breakfast.  I encouraged him to try to eat at least small meals throughout the day and mid morning by the latest especially since he is taking insulin frequently with a large dose of basal insulin.  Reports that his average readings of glucose around 140.  Did not bring his blood glucose log today nor his glucometer for us to evaluate.  His last A1c was 8.7 in December.  Prior to that was 8.0.  One of his main complaints at this time is his back pain and neuropathy.  Has consulted with Neurosurgery and continues to follow up with pain management for injections in efforts to give him relief.  Has been reliant on gabapentin and taking increased doses of this at times.  Was on the 600 mg but sometimes is taking 1200 mg twice a day.  Also relying on taking Norco intermittently and says that it is the only thing that really helps with his pain sufficiently.  Family History   Problem Relation Age of Onset    Glaucoma Mother     Cataracts Mother     Cataracts Father     Glaucoma Sister     Cataracts Sister     Glaucoma Maternal Aunt     No Known Problems Brother     No Known Problems Daughter     No Known Problems Son     Prostate cancer Neg Hx        Current Outpatient Medications:     amLODIPine (NORVASC) 10 MG tablet, TAKE 1 TABLET ONE TIME DAILY, Disp: 90 tablet, Rfl: 3    ammonium lactate 12 % Crea, Apply 1 Act topically 2 (two) times daily. (Patient taking differently: Apply 1 Act topically once daily. ), Disp: 1 Tube, Rfl: 3    atorvastatin (LIPITOR) 40 MG tablet, TAKE 1 TABLET  EVERY EVENING, Disp: 90 tablet, Rfl: 3    baclofen (LIORESAL) 10 MG tablet, Take 1 tablet (10 mg total) by mouth once daily., Disp: 90 tablet, Rfl: 3    ELIQUIS 5 mg Tab, , Disp: , Rfl:     furosemide (LASIX) 40 MG tablet, Take 2 tablets (80 mg total) by mouth every morning AND 1 tablet (40 mg total) every evening., Disp: 90 tablet, Rfl: 11    gabapentin (NEURONTIN) 800 MG tablet, Take 1 tablet (800 mg total) by mouth 3 (three) times daily., Disp: 270 tablet, Rfl: 0    glimepiride (AMARYL) 2 MG tablet, Take 1 tablet (2 mg total) by mouth before breakfast., Disp: 90 tablet, Rfl: 3    HYDROcodone-acetaminophen (NORCO) 7.5-325 mg per tablet, Take 1 tablet by mouth every 6 (six) hours as needed for Pain., Disp: 28 tablet, Rfl: 0    inhalation spacing device, Use as directed for inhalation., Disp: 1 Device, Rfl: 0    insulin (LANTUS SOLOSTAR U-100 INSULIN) glargine 100 units/mL (3mL) SubQ pen, Inject 70 Units into the skin every evening., Disp: 63 mL, Rfl: 3    insulin aspart U-100 (NOVOLOG FLEXPEN U-100 INSULIN) 100 unit/mL InPn pen, ADMINISTER 20 UNITS UNDER THE SKIN THREE TIMES DAILY WITH MEALS, Disp: 15 mL, Rfl: 0    insulin syringe-needle U-100 1 mL 31 gauge x 5/16 Syrg, , Disp: , Rfl:     ipratropium-albuterol (COMBIVENT)  mcg/actuation inhaler, Inhale 1 puff into the lungs 4 (four) times daily. Rescue, Disp: 4 g, Rfl: 0    isosorbide mononitrate (IMDUR) 60 MG 24 hr tablet, Take 1 tablet (60 mg total) by mouth once daily., Disp: 90 tablet, Rfl: 3    latanoprost 0.005 % ophthalmic solution, Place 1 drop into both eyes every evening., Disp: 1 Bottle, Rfl: 4    lisinopril 10 MG tablet, Take 1 tablet (10 mg total) by mouth once daily., Disp: 90 tablet, Rfl: 3    metFORMIN (GLUCOPHAGE) 1000 MG tablet, Take 1 tablet (1,000 mg total) by mouth 2 (two) times daily with meals., Disp: 180 tablet, Rfl: 3    metOLazone (ZAROXOLYN) 2.5 MG tablet, Take 1 tablet (2.5 mg total) by mouth once daily., Disp: 90  "tablet, Rfl: 3    metoprolol succinate (TOPROL-XL) 50 MG 24 hr tablet, TAKE 1 TABLET EVERY DAY, Disp: 90 tablet, Rfl: 5    nitroGLYCERIN (NITROSTAT) 0.3 MG SL tablet, PLACE 1 TABLET UNDER THE TONGUE EVERY 5 MINUTES AS NEEDED FOR CHEST PAIN, NO MORE THAN 3 TABLETS IN ONE DAY, Disp: 100 tablet, Rfl: 0    pantoprazole (PROTONIX) 40 MG tablet, Take 1 tablet (40 mg total) by mouth once daily., Disp: 30 tablet, Rfl: 11    pen needle, diabetic (BD INSULIN PEN NEEDLE UF SHORT) 31 gauge x 5/16" Ndle, Inject 1 each into the skin 3 (three) times daily., Disp: 90 each, Rfl: 11    sertraline (ZOLOFT) 50 MG tablet, TAKE 1 TABLET ONE TIME DAILY, Disp: 90 tablet, Rfl: 3    traZODone (DESYREL) 150 MG tablet, TAKE 1 TABLET EVERY NIGHT, Disp: 90 tablet, Rfl: 1    TRUE METRIX AIR GLUCOSE METER kit, TEST FOUR TIMES DAILY BEFORE MEALS  AND EVERY NIGHT, Disp: 1 each, Rfl: 0    VENTOLIN HFA 90 mcg/actuation inhaler, Inhale 2 puffs into the lungs every 4 (four) hours as needed for Wheezing., Disp: 3 Inhaler, Rfl: 4    ALCOHOL ANTISEPTIC PADS (ALCOHOL PREP PADS TOP), , Disp: , Rfl:     aspirin (ECOTRIN) 81 MG EC tablet, Take 1 tablet (81 mg total) by mouth once daily., Disp: , Rfl: 0    clotrimazole (LOTRIMIN) 1 % cream, Apply topically 2 (two) times daily. Apply to outer ears, Disp: 60 g, Rfl: 0    fluticasone-salmeterol 250-50 mcg/dose (ADVAIR DISKUS) 250-50 mcg/dose diskus inhaler, Inhale 1 puff into the lungs 2 (two) times daily. Controller, Disp: 60 each, Rfl: 11    tiotropium (SPIRIVA WITH HANDIHALER) 18 mcg inhalation capsule, Inhale 1 capsule (18 mcg total) into the lungs once daily. Controller, Disp: 90 capsule, Rfl: 3    Review of Systems   Constitutional: Negative for chills and fever.   Eyes: Negative for visual disturbance.   Respiratory: Negative for cough and shortness of breath.    Cardiovascular: Negative for chest pain.   Gastrointestinal: Negative for abdominal pain.   Musculoskeletal: Positive for arthralgias " "and back pain.   Neurological: Negative for dizziness.       Objective:   /80 (BP Location: Left arm, Patient Position: Sitting, BP Method: Medium (Automatic))   Pulse (!) 52   Temp 96.1 °F (35.6 °C) (Tympanic)   Resp 16   Ht 5' 5" (1.651 m)   Wt 88.3 kg (194 lb 10.7 oz)   SpO2 96%   BMI 32.39 kg/m²      Physical Exam   Constitutional: He is oriented to person, place, and time. He appears well-developed and well-nourished.   HENT:   Head: Normocephalic and atraumatic.   Eyes: Conjunctivae are normal.   Cardiovascular: Normal rate.   Pulmonary/Chest: Effort normal. No respiratory distress.   Musculoskeletal: He exhibits no edema.   Using Rollator walker   Neurological: He is alert and oriented to person, place, and time. Coordination normal.   Skin: Skin is warm and dry. No rash noted.   Psychiatric: He has a normal mood and affect. His behavior is normal.   Vitals reviewed.      Assessment & Plan     Problem List Items Addressed This Visit        Neuro    Osteoarthritis of spine with radiculopathy, lumbar region    Relevant Medications    gabapentin (NEURONTIN) 800 MG tablet    HNP (herniated nucleus pulposus), lumbar    Current Assessment & Plan     Unfortunately continues to require Norco and high doses of gabapentin for some relief.  I urged him to keep follow-up with surgery and Pain Management for definitive therapy.  Reminded him that we will not continue to prescribe Norco for ever.         Relevant Medications    gabapentin (NEURONTIN) 800 MG tablet       Ophtho    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, with long-term current use of insulin    Current Assessment & Plan     Hopefully his A1c is better today.  It seems he is making progress but I encouraged him to try to eat more consistently and explained to him that if he skips lot of meals it can actually be counterproductive.         Relevant Medications    insulin aspart U-100 " (NOVOLOG FLEXPEN U-100 INSULIN) 100 unit/mL InPn pen    insulin (LANTUS SOLOSTAR U-100 INSULIN) glargine 100 units/mL (3mL) SubQ pen       Cardiac/Vascular    Essential (primary) hypertension    Current Assessment & Plan     Blood pressure well controlled today.  Continue on same medications.           Other Visit Diagnoses     Type 2 diabetes mellitus with mild nonproliferative retinopathy, with long-term current use of insulin, macular edema presence unspecified, unspecified laterality        Relevant Medications    insulin aspart U-100 (NOVOLOG FLEXPEN U-100 INSULIN) 100 unit/mL InPn pen    insulin (LANTUS SOLOSTAR U-100 INSULIN) glargine 100 units/mL (3mL) SubQ pen    Other Relevant Orders    Comprehensive metabolic panel    Hemoglobin A1c    Microalbumin/creatinine urine ratio            No Follow-up on file.    Disclaimer:  This note may have been prepared using voice recognition software, it may have not been extensively proofed, as such there could be errors within the text such as sound alike errors.

## 2019-03-20 NOTE — ASSESSMENT & PLAN NOTE
Hopefully his A1c is better today.  It seems he is making progress but I encouraged him to try to eat more consistently and explained to him that if he skips lot of meals it can actually be counterproductive.

## 2019-03-26 ENCOUNTER — TELEPHONE (OUTPATIENT)
Dept: RADIOLOGY | Facility: HOSPITAL | Age: 64
End: 2019-03-26

## 2019-03-28 RX ORDER — HYDROCODONE BITARTRATE AND ACETAMINOPHEN 7.5; 325 MG/1; MG/1
1 TABLET ORAL EVERY 6 HOURS PRN
Qty: 28 TABLET | Refills: 0 | OUTPATIENT
Start: 2019-03-28 | End: 2019-04-11

## 2019-03-28 NOTE — TELEPHONE ENCOUNTER
----- Message from Skylar Whitt sent at 3/28/2019 10:07 AM CDT -----  Contact: pt  Type:  RX Refill Request    Who Called: Patient  Refill or New Rx:Refill  RX Name and Strength:hdrodone 7.5mg  How is the patient currently taking it? (ex. 1XDay):na  Is this a 30 day or 90 day RX:30  Preferred Pharmacy with phone number:[facility]  Local or Mail Order:Local  Ordering Provider:Dr Lambert  Would the patient rather a call back or a response via [facility]? Call back  Best Call Back Number:[phone]  Additional Information: na

## 2019-04-03 RX ORDER — HYDROCODONE BITARTRATE AND ACETAMINOPHEN 5; 325 MG/1; MG/1
1 TABLET ORAL EVERY 6 HOURS PRN
Qty: 20 TABLET | Refills: 0 | Status: CANCELLED | OUTPATIENT
Start: 2019-04-03 | End: 2019-04-13

## 2019-04-03 RX ORDER — HYDROCODONE BITARTRATE AND ACETAMINOPHEN 5; 325 MG/1; MG/1
1 TABLET ORAL EVERY 6 HOURS PRN
Qty: 28 TABLET | Refills: 0 | Status: SHIPPED | OUTPATIENT
Start: 2019-04-03 | End: 2019-06-27

## 2019-04-03 NOTE — TELEPHONE ENCOUNTER
----- Message from Lucille Campbell sent at 4/3/2019  8:10 AM CDT -----  Contact: Patient  1. What is the name of the medication you are requesting? hydrocodone  2. What is the dose? 7.5mg  3. How do you take the medication? Orally, topically, etc? orally  4. How often do you take this medication? Twice daily  5. Do you need a 30 day or 90 day supply?   6. How many refills are you requesting?   7. What is your preferred pharmacy and location of the pharmacy? [facility] in [place]  8. Who can we contact with further questions? Patient

## 2019-04-03 NOTE — TELEPHONE ENCOUNTER
Sent refill with 5mg strength. Will not send any further refills for narcotics. This is the last one I am sending. He should only take for severe pain. Keep follow up with pain management/neurosurg

## 2019-04-08 ENCOUNTER — OUTPATIENT CASE MANAGEMENT (OUTPATIENT)
Dept: ADMINISTRATIVE | Facility: OTHER | Age: 64
End: 2019-04-08

## 2019-04-08 NOTE — PROGRESS NOTES
4/8/19  Summary:  Attempted to complete initial assessment for Outpatient Care Management    Interventions:  Left message requesting a return call    Plan:  Scheduled pt for call back. Rosa Marrero RN    4/9/19  Summary:  2nd Attempt to complete initial assessment for OPCM    Interventions:  Left message requesting a return call, will also mail a letter requesting a return call    Plan:  Scheduled pt for call back. Rosa Marrero RN    4/17/19  Summary:  3rd Attempt to complete initial assessment for OPCM    Interventions:  Left message requesting a return call, a letter was mailed at the time of the 2nd attempt.     Plan:  Close case. Rosa Marrero RN

## 2019-04-08 NOTE — LETTER
April 9, 2019    Crow Green  [address]  [facility] [address]             [facility] [address] [place] [address] Dear Mr. Green,    I work with [facility] Outpatient Case Management Department. We received a referral to call you to discuss your medical history.These services are free of charge and are offered to [facility] patients who have recently been discharged from any of our facilities or who have complex medical conditions that may require the skill of a nurse to assist with management.         .      I attempted to reach you by telephone, but I was unsuccessful. Please call our department so that we can go over some questions with you regarding your health.    The Outpatient Case Management Department can be reached at [phone] from 8:00AM to 4:30 PM on Monday thru Friday. [facility] also has a program where a nurse is available 24/7 to answer questions or provide medical advice, their number is [phone].    Thanks,  Rosa Marrero RN  Outpatient Care Management  [phone]

## 2019-04-23 ENCOUNTER — TELEPHONE (OUTPATIENT)
Dept: INTERNAL MEDICINE | Facility: CLINIC | Age: 64
End: 2019-04-23

## 2019-04-23 ENCOUNTER — HOSPITAL ENCOUNTER (EMERGENCY)
Facility: HOSPITAL | Age: 64
Discharge: HOME | End: 2019-04-23
Attending: EMERGENCY MEDICINE
Payer: MEDICARE

## 2019-04-23 VITALS
TEMPERATURE: 98 F | RESPIRATION RATE: 14 BRPM | HEIGHT: 65 IN | DIASTOLIC BLOOD PRESSURE: 87 MMHG | WEIGHT: 192.38 LBS | SYSTOLIC BLOOD PRESSURE: 194 MMHG | HEART RATE: 79 BPM | BODY MASS INDEX: 32.05 KG/M2 | OXYGEN SATURATION: 96 %
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
## 2019-04-23 DIAGNOSIS — R07.81 RIB PAIN ON LEFT SIDE: Primary | ICD-10-CM

## 2019-04-23 DIAGNOSIS — I10 ESSENTIAL HYPERTENSION: ICD-10-CM

## 2019-04-23 PROCEDURE — 86703 HIV-1/HIV-2 1 RESULT ANTBDY: CPT | Mod: HCNC

## 2019-04-23 PROCEDURE — 99284 EMERGENCY DEPT VISIT MOD MDM: CPT | Mod: HCNC

## 2019-04-23 PROCEDURE — 86803 HEPATITIS C AB TEST: CPT | Mod: HCNC

## 2019-04-23 NOTE — TELEPHONE ENCOUNTER
Pt call returned, pt advised he would to schedule f/u per ER for fall. Pt stated he was going to find another doctor cause  will not give him pain medications that he needs.

## 2019-04-23 NOTE — TELEPHONE ENCOUNTER
----- Message from Neyda Bashir sent at 4/23/2019 12:36 PM CDT -----  Contact: Patient   Type:  Needs Medical Advice    Who Called: Crow  Symptoms (please be specific): Pain and soreness due to a fall injury  How long has patient had these symptoms: Thursday  Pharmacy name and phone #:   [facility] [place] [id number] - [facility] [place] - [address] AT [place] & [facility]  [address] [place] [address]  Phone: [phone] Fax: [phone]  Would the patient rather a call back or a response via [facility]? Call back   Best Call Back Number: Please call him at [phone]  Additional Information: n/a

## 2019-04-23 NOTE — ED PROVIDER NOTES
SCRIBE #1 NOTE: I, Helen Tang, am scribing for, and in the presence of, Ewelina Kay MD. I have scribed the entire note.       History     Chief Complaint   Patient presents with    Fall     reports fall on thursday, c/o left rib pain and right hand pain      Review of patient's allergies indicates:  No Known Allergies      History of Present Illness     HPI    4/23/2019, 11:00 AM  History obtained from the patient      History of Present Illness: Crow Green is a 63 y.o. male patient who presents to the Emergency Department for evaluation of fall down stairs x 6 days ago. Symptoms are constant and moderate in severity. No mitigating or exacerbating factors reported. Associated sxs include L rib myalgias. Patient denies any head injury, fever, sore throat, SOB, CP, N/V/D, dysuria, flank pain, back pain, neck pain, dizziness, syncope and all other sxs at this time. Prior Tx includes. Patient has cut on right palm due to the fall. No further complaints or concerns at this time.         Arrival mode: Personal vehicle     PCP: Mateo Lambert DO        Past Medical History:  Past Medical History:   Diagnosis Date    Acute kidney injury     Arthritis     Asthma     Atrial fibrillation     CHF (congestive heart failure)     Depression     Diabetes mellitus, type 2 Diagnosed in 2000    Elevated PSA     Hypertension     Sleep apnea        Past Surgical History:  Past Surgical History:   Procedure Laterality Date    BIOPSY-ARTERY-TEMPORAL Left 9/1/2017    Performed by Torin Bishop [facility] at [facility] OR    CHOLECYSTECTOMY      CHOLECYSTECTOMY-LAPAROSCOPIC N/A 3/17/2017    Performed by Louis O. Jeansonne IV, MD at [facility] OR         Family History:  Family History   Problem Relation Age of Onset    Glaucoma Mother     Cataracts Mother     Cataracts Father     Glaucoma Sister     Cataracts Sister     Glaucoma Maternal Aunt     No Known Problems Brother     No Known Problems Daughter     No  Known Problems Son     Prostate cancer Neg Hx        Social History:  Social History     Tobacco Use    Smoking status: Never Smoker    Smokeless tobacco: Never Used   Substance and Sexual Activity    Alcohol use: No    Drug use: No    Sexual activity: Not Currently        Review of Systems     Review of Systems   Constitutional: Negative for fever.   HENT: Negative for sore throat.    Respiratory: Negative for shortness of breath.    Cardiovascular: Negative for chest pain.   Gastrointestinal: Negative for diarrhea, nausea and vomiting.   Genitourinary: Negative for dysuria and flank pain.   Musculoskeletal: Positive for myalgias (L rib pain). Negative for back pain and neck pain.   Skin: Negative for rash.   Neurological: Negative for dizziness, syncope and weakness.   Hematological: Does not bruise/bleed easily.   All other systems reviewed and are negative.       Physical Exam     Initial Vitals [04/23/19 0954]   BP Pulse Resp Temp SpO2   (!) 194/87 79 14 97.9 °F (36.6 °C) 96 %      MAP       --          Physical Exam  Constitutional: Patient is in no distress. Awake and alert. Appropriate for age.   Head: Atraumatic. No facial instability or step-offs.   Eyes: PERRL. EOM normal. Conjunctivae normal.   HENT: Moist mucous membranes. No epistaxis. Patent airway.   Neck: No midline bony tenderness, deformities, or step-offs   Cardiovascular: Regular rate and rhythm. Heart sounds are normal. Intact distal pulses   Pulmonary/Chest: No respiratory distress. Breath sounds are normal. No decreased breath sounds. Chest wall is stable.   Abdominal: Soft and non-distended. Non-tender.   Back: No abrasions or ecchymosis. No midline bony tenderness to the T-spine or L-spine. No deformities or step-offs.   Musculoskeletal: Full range of motion in bilateral extremities. No obvious deformities. Reproducible tenderness to L anterior chest wall. No obvious crepitance or ecchymosis.   Skin: Normal color. No cyanosis. Small  "abrasion to R palm, no infection, no swelling or deformity.   Neurological: Awake and alert. Appropriate for age. GCS 15. Normal speech. Motor strength is normal at 5/5 bilaterally. Non-focal neurological examination.       ED Course   Procedures  ED Vital Signs:  Vitals:    04/23/19 0954   BP: (!) 194/87   Pulse: 79   Resp: 14   Temp: 97.9 °F (36.6 °C)   TempSrc: Oral   SpO2: 96%   Weight: 87.2 kg (192 lb 5.6 oz)   Height: 5' 5" (1.651 m)       Abnormal Lab Results:  Labs Reviewed   HIV 1 / 2 ANTIBODY   HEPATITIS C ANTIBODY        Imaging Results:  Imaging Results          X-Ray Ribs 2 View Left (Final result)  Result time 04/23/19 10:45:14    Final result by DARELL Bose Sr., MD (04/23/19 10:45:14)                 Impression:      Normal study.      Electronically signed by: Vimal Bose MD  Date:    04/23/2019  Time:    10:45             Narrative:    EXAMINATION:  XR RIBS 2 VIEW LEFT    CLINICAL HISTORY:  Pleurodynia    COMPARISON:  12/10/2018    FINDINGS:  There is no acute rib fracture visualized.  The visualized portion of the lungs is clear.  There is no pneumothorax.  The left costophrenic angle is sharp.                                      The Emergency Provider reviewed the vital signs and test results, which are outlined above.     ED Discussion     11:08 AM: Reassessed pt at this time.  Patient under pain contract with Dr. Lambert, says he is out of his hydrocodone, explained to patient I was unable to give him any sharon medication from the ER and would have to follow up with Dr. Lambert for further tx. Discussed with pt all pertinent ED information and results. Discussed pt dx and plan of tx. Gave pt all f/u and return to the ED instructions. All questions and concerns were addressed at this time. Pt expresses understanding of information and instructions, and is comfortable with plan to discharge. Pt is stable for discharge.    Trauma precautions were discussed with patient and/or family/caretaker; " I do not specifically detect any abdominal, thoracic, CNS, orthopedic, or other emergent or life threatening condition and that patient is safe to be discharged.  It was also discussed that despite an unrevealing examination and negative radiographic examination for serious or life threatening injury, these conditions may still exist.  As such, patient should return to ED immediately should they experience, severe or worsening pain, shortness of breath, abdominal pain, headache, vomiting, or any other concern.  It was also discussed that not infrequently, injuries may not be diagnosed during the initial ED visit (such as fractures) and that if the patient discovers a new area of concern, a new area of injury that was not evaluated in the ED, they should return for evaluation as they may have an injury that requires treatment.          ED Medication(s):  Medications - No data to display    Discharge Medication List as of 4/23/2019 10:59 AM          Follow-up Information     Mateo Lambert DO. Schedule an appointment as soon as possible for a visit in 2 days.    Specialty:  Family Medicine  Why:  Return to the Emergency Room, If symptoms worsen  Contact information:  [address] [address] [address]  [phone]                         Medical Decision Making:   Clinical Tests:   Radiological Study: Ordered and Reviewed             Scribe Attestation:   Scribe #1: I performed the above scribed service and the documentation accurately describes the services I performed. I attest to the accuracy of the note.     Attending:   Physician Attestation Statement for Scribe #1: I, Ewelina Kay MD, personally performed the services described in this documentation, as scribed by Helen Tang, in my presence, and it is both accurate and complete.           Clinical Impression       ICD-10-CM ICD-9-CM   1. Rib pain on left side R07.81 786.50   2. Fall at home, initial encounter W19.XXXA E888.9    Y92.009 E849.0    3. Essential hypertension I10 401.9       Disposition:   Disposition: Discharged  Condition: Stable         Ewelina Kay MD  04/23/19 1140

## 2019-04-24 LAB
HCV AB SERPL QL IA: NEGATIVE
HIV 1+2 AB+HIV1 P24 AG SERPL QL IA: NEGATIVE

## 2019-05-09 ENCOUNTER — HOSPITAL ENCOUNTER (EMERGENCY)
Facility: HOSPITAL | Age: 64
Discharge: HOME | End: 2019-05-09
Attending: EMERGENCY MEDICINE
Payer: MEDICARE

## 2019-05-09 VITALS
OXYGEN SATURATION: 99 % | TEMPERATURE: 98 F | DIASTOLIC BLOOD PRESSURE: 77 MMHG | HEART RATE: 88 BPM | BODY MASS INDEX: 31.46 KG/M2 | SYSTOLIC BLOOD PRESSURE: 144 MMHG | HEIGHT: 65 IN | RESPIRATION RATE: 16 BRPM | WEIGHT: 188.81 LBS

## 2019-05-09 DIAGNOSIS — R73.9 HYPERGLYCEMIA: ICD-10-CM

## 2019-05-09 DIAGNOSIS — E11.59 HYPERTENSION ASSOCIATED WITH DIABETES: ICD-10-CM

## 2019-05-09 DIAGNOSIS — R51.9 NONINTRACTABLE HEADACHE, UNSPECIFIED CHRONICITY PATTERN, UNSPECIFIED HEADACHE TYPE: Primary | ICD-10-CM

## 2019-05-09 DIAGNOSIS — E11.69 HYPERLIPIDEMIA ASSOCIATED WITH TYPE 2 DIABETES MELLITUS: ICD-10-CM

## 2019-05-09 DIAGNOSIS — E78.5 HYPERLIPIDEMIA ASSOCIATED WITH TYPE 2 DIABETES MELLITUS: ICD-10-CM

## 2019-05-09 DIAGNOSIS — I15.2 HYPERTENSION ASSOCIATED WITH DIABETES: ICD-10-CM

## 2019-05-09 LAB
ALBUMIN SERPL BCP-MCNC: 3.3 G/DL (ref 3.5–5.2)
ALP SERPL-CCNC: 96 U/L (ref 55–135)
ALT SERPL W/O P-5'-P-CCNC: 19 U/L (ref 10–44)
ANION GAP SERPL CALC-SCNC: 10 MMOL/L (ref 8–16)
AST SERPL-CCNC: 15 U/L (ref 10–40)
BASOPHILS # BLD AUTO: 0.02 K/UL (ref 0–0.2)
BASOPHILS NFR BLD: 0.2 % (ref 0–1.9)
BILIRUB SERPL-MCNC: 0.2 MG/DL (ref 0.1–1)
BUN SERPL-MCNC: 14 MG/DL (ref 8–23)
CALCIUM SERPL-MCNC: 9.6 MG/DL (ref 8.7–10.5)
CHLORIDE SERPL-SCNC: 96 MMOL/L (ref 95–110)
CO2 SERPL-SCNC: 28 MMOL/L (ref 23–29)
CREAT SERPL-MCNC: 1.2 MG/DL (ref 0.5–1.4)
DIFFERENTIAL METHOD: ABNORMAL
EOSINOPHIL # BLD AUTO: 0.1 K/UL (ref 0–0.5)
EOSINOPHIL NFR BLD: 1.6 % (ref 0–8)
ERYTHROCYTE [DISTWIDTH] IN BLOOD BY AUTOMATED COUNT: 13.2 % (ref 11.5–14.5)
EST. GFR  (AFRICAN AMERICAN): >60 ML/MIN/1.73 M^2
EST. GFR  (NON AFRICAN AMERICAN): >60 ML/MIN/1.73 M^2
GLUCOSE SERPL-MCNC: 271 MG/DL (ref 70–110)
HCT VFR BLD AUTO: 41.8 % (ref 40–54)
HGB BLD-MCNC: 13.8 G/DL (ref 14–18)
LYMPHOCYTES # BLD AUTO: 2.6 K/UL (ref 1–4.8)
LYMPHOCYTES NFR BLD: 29.2 % (ref 18–48)
MCH RBC QN AUTO: 29 PG (ref 27–31)
MCHC RBC AUTO-ENTMCNC: 33 G/DL (ref 32–36)
MCV RBC AUTO: 88 FL (ref 82–98)
MONOCYTES # BLD AUTO: 0.6 K/UL (ref 0.3–1)
MONOCYTES NFR BLD: 7.1 % (ref 4–15)
NEUTROPHILS # BLD AUTO: 5.5 K/UL (ref 1.8–7.7)
NEUTROPHILS NFR BLD: 61.9 % (ref 38–73)
PLATELET # BLD AUTO: 50 K/UL (ref 150–350)
PMV BLD AUTO: 11.1 FL (ref 9.2–12.9)
POCT GLUCOSE: 233 MG/DL (ref 70–110)
POTASSIUM SERPL-SCNC: 4 MMOL/L (ref 3.5–5.1)
PROT SERPL-MCNC: 7.5 G/DL (ref 6–8.4)
RBC # BLD AUTO: 4.76 M/UL (ref 4.6–6.2)
SODIUM SERPL-SCNC: 134 MMOL/L (ref 136–145)
WBC # BLD AUTO: 8.89 K/UL (ref 3.9–12.7)

## 2019-05-09 PROCEDURE — 85025 COMPLETE CBC W/AUTO DIFF WBC: CPT | Mod: HCNC

## 2019-05-09 PROCEDURE — 96375 TX/PRO/DX INJ NEW DRUG ADDON: CPT | Mod: HCNC

## 2019-05-09 PROCEDURE — 25000003 PHARM REV CODE 250: Mod: HCNC | Performed by: EMERGENCY MEDICINE

## 2019-05-09 PROCEDURE — 82962 GLUCOSE BLOOD TEST: CPT | Mod: HCNC

## 2019-05-09 PROCEDURE — 63600175 PHARM REV CODE 636 W HCPCS: Mod: HCNC | Performed by: EMERGENCY MEDICINE

## 2019-05-09 PROCEDURE — 96361 HYDRATE IV INFUSION ADD-ON: CPT | Mod: HCNC

## 2019-05-09 PROCEDURE — 99284 EMERGENCY DEPT VISIT MOD MDM: CPT | Mod: 25,HCNC

## 2019-05-09 PROCEDURE — C1751 CATH, INF, PER/CENT/MIDLINE: HCPCS | Mod: HCNC

## 2019-05-09 PROCEDURE — 80053 COMPREHEN METABOLIC PANEL: CPT | Mod: HCNC

## 2019-05-09 PROCEDURE — 96374 THER/PROPH/DIAG INJ IV PUSH: CPT | Mod: HCNC

## 2019-05-09 PROCEDURE — 36410 VNPNXR 3YR/> PHY/QHP DX/THER: CPT | Mod: HCNC

## 2019-05-09 RX ORDER — AMLODIPINE BESYLATE 10 MG/1
TABLET ORAL
Qty: 90 TABLET | Refills: 3 | Status: SHIPPED | OUTPATIENT
Start: 2019-05-09 | End: 2019-07-29 | Stop reason: SDUPTHER

## 2019-05-09 RX ORDER — BUTALBITAL, ACETAMINOPHEN AND CAFFEINE 50; 325; 40 MG/1; MG/1; MG/1
1 TABLET ORAL EVERY 4 HOURS PRN
Qty: 15 TABLET | Refills: 0 | Status: SHIPPED | OUTPATIENT
Start: 2019-05-09 | End: 2019-06-08

## 2019-05-09 RX ORDER — ONDANSETRON 2 MG/ML
4 INJECTION INTRAMUSCULAR; INTRAVENOUS
Status: COMPLETED | OUTPATIENT
Start: 2019-05-09 | End: 2019-05-09

## 2019-05-09 RX ORDER — BUTALBITAL, ACETAMINOPHEN AND CAFFEINE 50; 325; 40 MG/1; MG/1; MG/1
1 TABLET ORAL
Status: COMPLETED | OUTPATIENT
Start: 2019-05-09 | End: 2019-05-09

## 2019-05-09 RX ORDER — ATORVASTATIN CALCIUM 40 MG/1
40 TABLET, FILM COATED ORAL NIGHTLY
Qty: 90 TABLET | Refills: 3 | Status: SHIPPED | OUTPATIENT
Start: 2019-05-09 | End: 2019-07-29 | Stop reason: SDUPTHER

## 2019-05-09 RX ORDER — KETOROLAC TROMETHAMINE 30 MG/ML
15 INJECTION, SOLUTION INTRAMUSCULAR; INTRAVENOUS
Status: COMPLETED | OUTPATIENT
Start: 2019-05-09 | End: 2019-05-09

## 2019-05-09 RX ORDER — INSULIN ASPART 100 [IU]/ML
INJECTION, SOLUTION INTRAVENOUS; SUBCUTANEOUS
Qty: 15 ML | Refills: 0 | Status: SHIPPED | OUTPATIENT
Start: 2019-05-09 | End: 2019-05-27 | Stop reason: SDUPTHER

## 2019-05-09 RX ADMIN — SODIUM CHLORIDE 1000 ML: 0.9 INJECTION, SOLUTION INTRAVENOUS at 10:05

## 2019-05-09 RX ADMIN — KETOROLAC TROMETHAMINE 15 MG: 30 INJECTION, SOLUTION INTRAMUSCULAR; INTRAVENOUS at 11:05

## 2019-05-09 RX ADMIN — BUTALBITAL, ACETAMINOPHEN AND CAFFEINE 1 TABLET: 50; 325; 40 TABLET ORAL at 11:05

## 2019-05-09 RX ADMIN — ONDANSETRON 4 MG: 2 INJECTION INTRAMUSCULAR; INTRAVENOUS at 10:05

## 2019-05-09 NOTE — ED NOTES
Pt AAOx3, resting in bed watching tv, side rails up x 2, call bell within reach. NAD at this time. Will continue to monitor.

## 2019-05-09 NOTE — ED NOTES
Patient states his head usually hurts like this when his blood sugar is high. Will notify Dr. Norwood.

## 2019-05-13 ENCOUNTER — LAB VISIT (OUTPATIENT)
Dept: LAB | Facility: HOSPITAL | Age: 64
End: 2019-05-13
Attending: PHYSICIAN ASSISTANT
Payer: MEDICARE

## 2019-05-13 ENCOUNTER — OFFICE VISIT (OUTPATIENT)
Dept: DERMATOLOGY | Facility: CLINIC | Age: 64
End: 2019-05-13
Payer: MEDICARE

## 2019-05-13 DIAGNOSIS — L29.9 PRURITUS: ICD-10-CM

## 2019-05-13 DIAGNOSIS — L30.9 DERMATITIS: Primary | ICD-10-CM

## 2019-05-13 DIAGNOSIS — L30.9 DERMATITIS: ICD-10-CM

## 2019-05-13 LAB
ALBUMIN SERPL BCP-MCNC: 3.4 G/DL (ref 3.5–5.2)
ALP SERPL-CCNC: 86 U/L (ref 55–135)
ALT SERPL W/O P-5'-P-CCNC: 20 U/L (ref 10–44)
ANION GAP SERPL CALC-SCNC: 11 MMOL/L (ref 8–16)
AST SERPL-CCNC: 21 U/L (ref 10–40)
BASOPHILS # BLD AUTO: 0.01 K/UL (ref 0–0.2)
BASOPHILS NFR BLD: 0.1 % (ref 0–1.9)
BILIRUB SERPL-MCNC: 0.2 MG/DL (ref 0.1–1)
BUN SERPL-MCNC: 16 MG/DL (ref 8–23)
CALCIUM SERPL-MCNC: 9.7 MG/DL (ref 8.7–10.5)
CHLORIDE SERPL-SCNC: 100 MMOL/L (ref 95–110)
CO2 SERPL-SCNC: 25 MMOL/L (ref 23–29)
CREAT SERPL-MCNC: 0.9 MG/DL (ref 0.5–1.4)
DIFFERENTIAL METHOD: ABNORMAL
EOSINOPHIL # BLD AUTO: 0.2 K/UL (ref 0–0.5)
EOSINOPHIL NFR BLD: 2.8 % (ref 0–8)
ERYTHROCYTE [DISTWIDTH] IN BLOOD BY AUTOMATED COUNT: 13.2 % (ref 11.5–14.5)
EST. GFR  (AFRICAN AMERICAN): >60 ML/MIN/1.73 M^2
EST. GFR  (NON AFRICAN AMERICAN): >60 ML/MIN/1.73 M^2
GLUCOSE SERPL-MCNC: 118 MG/DL (ref 70–110)
HCT VFR BLD AUTO: 46.5 % (ref 40–54)
HGB BLD-MCNC: 14.6 G/DL (ref 14–18)
IMM GRANULOCYTES # BLD AUTO: 0.03 K/UL (ref 0–0.04)
IMM GRANULOCYTES NFR BLD AUTO: 0.4 % (ref 0–0.5)
LYMPHOCYTES # BLD AUTO: 1.9 K/UL (ref 1–4.8)
LYMPHOCYTES NFR BLD: 24.1 % (ref 18–48)
MCH RBC QN AUTO: 28.2 PG (ref 27–31)
MCHC RBC AUTO-ENTMCNC: 31.4 G/DL (ref 32–36)
MCV RBC AUTO: 90 FL (ref 82–98)
MONOCYTES # BLD AUTO: 0.5 K/UL (ref 0.3–1)
MONOCYTES NFR BLD: 6.3 % (ref 4–15)
NEUTROPHILS # BLD AUTO: 5.2 K/UL (ref 1.8–7.7)
NEUTROPHILS NFR BLD: 66.3 % (ref 38–73)
NRBC BLD-RTO: 0 /100 WBC
PLATELET # BLD AUTO: 129 K/UL (ref 150–350)
PMV BLD AUTO: 12.6 FL (ref 9.2–12.9)
POTASSIUM SERPL-SCNC: 4 MMOL/L (ref 3.5–5.1)
PROT SERPL-MCNC: 7.9 G/DL (ref 6–8.4)
RBC # BLD AUTO: 5.18 M/UL (ref 4.6–6.2)
SODIUM SERPL-SCNC: 136 MMOL/L (ref 136–145)
TSH SERPL DL<=0.005 MIU/L-ACNC: 1.31 UIU/ML (ref 0.4–4)
WBC # BLD AUTO: 7.77 K/UL (ref 3.9–12.7)

## 2019-05-13 PROCEDURE — 11105 PUNCH BX SKIN EA SEP/ADDL: CPT | Mod: PBBFAC,HCNC,PN | Performed by: PHYSICIAN ASSISTANT

## 2019-05-13 PROCEDURE — 99202 OFFICE O/P NEW SF 15 MIN: CPT | Mod: 25,S$PBB,HCNC, | Performed by: PHYSICIAN ASSISTANT

## 2019-05-13 PROCEDURE — 99999 PR PBB SHADOW E&M-EST. PATIENT-LVL III: ICD-10-PCS | Mod: PBBFAC,HCNC,, | Performed by: PHYSICIAN ASSISTANT

## 2019-05-13 PROCEDURE — 11104 PUNCH BX SKIN SINGLE LESION: CPT | Mod: S$PBB,HCNC,, | Performed by: PHYSICIAN ASSISTANT

## 2019-05-13 PROCEDURE — 80053 COMPREHEN METABOLIC PANEL: CPT | Mod: HCNC

## 2019-05-13 PROCEDURE — 99202 PR OFFICE/OUTPT VISIT, NEW, LEVL II, 15-29 MIN: ICD-10-PCS | Mod: 25,S$PBB,HCNC, | Performed by: PHYSICIAN ASSISTANT

## 2019-05-13 PROCEDURE — 88313 SPECIAL STAINS GROUP 2: CPT | Mod: 26,HCNC,, | Performed by: PATHOLOGY

## 2019-05-13 PROCEDURE — 85025 COMPLETE CBC W/AUTO DIFF WBC: CPT | Mod: HCNC

## 2019-05-13 PROCEDURE — 88313 TISSUE SPECIMEN TO PATHOLOGY, DERMATOLOGY: ICD-10-PCS | Mod: 26,HCNC,, | Performed by: PATHOLOGY

## 2019-05-13 PROCEDURE — 84443 ASSAY THYROID STIM HORMONE: CPT | Mod: HCNC

## 2019-05-13 PROCEDURE — 88305 TISSUE EXAM BY PATHOLOGIST: CPT | Mod: 26,HCNC,, | Performed by: PATHOLOGY

## 2019-05-13 PROCEDURE — 11104 PR PUNCH BIOPSY, SKIN, SINGLE LESION: ICD-10-PCS | Mod: S$PBB,HCNC,, | Performed by: PHYSICIAN ASSISTANT

## 2019-05-13 PROCEDURE — 99999 PR PBB SHADOW E&M-EST. PATIENT-LVL III: CPT | Mod: PBBFAC,HCNC,, | Performed by: PHYSICIAN ASSISTANT

## 2019-05-13 PROCEDURE — 88305 TISSUE EXAM BY PATHOLOGIST: CPT | Mod: HCNC | Performed by: PATHOLOGY

## 2019-05-13 PROCEDURE — 11104 PUNCH BX SKIN SINGLE LESION: CPT | Mod: PBBFAC,HCNC,PN | Performed by: PHYSICIAN ASSISTANT

## 2019-05-13 PROCEDURE — 36415 COLL VENOUS BLD VENIPUNCTURE: CPT | Mod: HCNC

## 2019-05-13 PROCEDURE — 11105 PUNCH BX SKIN EA SEP/ADDL: CPT | Mod: S$PBB,HCNC,, | Performed by: PHYSICIAN ASSISTANT

## 2019-05-13 PROCEDURE — 11105 PR PUNCH BIOPSY, SKIN, EA ADDTL LESION: ICD-10-PCS | Mod: S$PBB,HCNC,, | Performed by: PHYSICIAN ASSISTANT

## 2019-05-13 PROCEDURE — 88305 TISSUE SPECIMEN TO PATHOLOGY, DERMATOLOGY: ICD-10-PCS | Mod: 26,HCNC,, | Performed by: PATHOLOGY

## 2019-05-13 RX ORDER — HYDROXYZINE HYDROCHLORIDE 10 MG/1
10 TABLET, FILM COATED ORAL NIGHTLY PRN
Qty: 30 TABLET | Refills: 0 | Status: SHIPPED | OUTPATIENT
Start: 2019-05-13 | End: 2019-06-12

## 2019-05-13 RX ORDER — TRIAMCINOLONE ACETONIDE 1 MG/G
CREAM TOPICAL
Qty: 45 G | Refills: 1 | Status: SHIPPED | OUTPATIENT
Start: 2019-05-13 | End: 2019-06-27

## 2019-05-13 RX ORDER — LEVOCETIRIZINE DIHYDROCHLORIDE 5 MG/1
TABLET, FILM COATED ORAL
Qty: 30 TABLET | Refills: 0 | Status: SHIPPED | OUTPATIENT
Start: 2019-05-13 | End: 2019-06-27

## 2019-05-13 NOTE — PROGRESS NOTES
Subjective:       Patient ID:  Crow Green is a 63 y.o. male who presents for   Chief Complaint   Patient presents with    Rash     Itchy,dry, flaky rash to upper body x1 week      History of Present Illness: The patient presents with chief complaint of rash   Location: upper body and scalp  Duration: 1-2 weeks  Signs/Symptoms: itchy, dry and flaky, redness, and burning    Prior treatments: blue star ointment with some help intially  PMHX: + IDDM, CHF      Review of Systems   Constitutional: Negative for fever and chills.   Gastrointestinal: Negative for nausea and vomiting.   Skin: Positive for itching, rash, dry skin and activity-related sunscreen use. Negative for daily sunscreen use and recent sunburn.   Hematologic/Lymphatic: Does not bruise/bleed easily.        Objective:    Physical Exam   Constitutional: He appears well-developed and well-nourished. No distress.   Neurological: He is alert and oriented to person, place, and time. He is not disoriented.   Psychiatric: He has a normal mood and affect.   Skin:   Areas Examined (abnormalities noted in diagram):   Scalp / Hair Palpated and Inspected  Head / Face Inspection Performed  Neck Inspection Performed  Chest / Axilla Inspection Performed  Abdomen Inspection Performed  Back Inspection Performed  RUE Inspected  LUE Inspection Performed              Diagram Legend     Erythematous scaling macule/papule c/w actinic keratosis       Vascular papule c/w angioma      Pigmented verrucoid papule/plaque c/w seborrheic keratosis      Yellow umbilicated papule c/w sebaceous hyperplasia      Irregularly shaped tan macule c/w lentigo     1-2 mm smooth white papules consistent with Milia      Movable subcutaneous cyst with punctum c/w epidermal inclusion cyst      Subcutaneous movable cyst c/w pilar cyst      Firm pink to brown papule c/w dermatofibroma      Pedunculated fleshy papule(s) c/w skin tag(s)      Evenly pigmented macule c/w junctional nevus      Mildly variegated pigmented, slightly irregular-bordered macule c/w mildly atypical nevus      Flesh colored to evenly pigmented papule c/w intradermal nevus       Pink pearly papule/plaque c/w basal cell carcinoma      Erythematous hyperkeratotic cursted plaque c/w SCC      Surgical scar with no sign of skin cancer recurrence      Open and closed comedones      Inflammatory papules and pustules      Verrucoid papule consistent consistent with wart     Erythematous eczematous patches and plaques     Dystrophic onycholytic nail with subungual debris c/w onychomycosis     Umbilicated papule    Erythematous-base heme-crusted tan verrucoid plaque consistent with inflamed seborrheic keratosis     Erythematous Silvery Scaling Plaque c/w Psoriasis     See annotation      Assessment / Plan:      Pathology Orders:     Normal Orders This Visit    Tissue Specimen To Pathology, Dermatology     Questions:    Directional Terms:  Other(comment)    Clinical Information:  r/o drug reaction vs. dermatomyositis vs. other    Specific Site:  Right chest    Tissue Specimen To Pathology, Dermatology     Questions:    Directional Terms:  Other(comment)    Clinical Information:  r/o drug reaction vs. Dermatomyositis vs. other    Specific Site:  right forearm        Dermatitis  -     Tissue Specimen To Pathology, Dermatology  -     Tissue Specimen To Pathology, Dermatology  -     triamcinolone acetonide 0.1% (KENALOG) 0.1 % cream; AAA of rash and itching bid prn. Strong steroid- do not apply to face or folds of skin.  Dispense: 45 g; Refill: 1  -     levocetirizine (XYZAL) 5 MG tablet; Take 1 tablet each morning PRN itching.  Dispense: 30 tablet; Refill: 0  -     hydrOXYzine HCl (ATARAX) 10 MG Tab; Take 1 tablet (10 mg total) by mouth nightly as needed. May cause drowsiness.  Do not drive or operate heavy machinery.  Dispense: 30 tablet; Refill: 0  -     Comprehensive metabolic panel; Future; Expected date: 05/13/2019  -     [facility] auto  differential; Future; Expected date: 05/13/2019  -     TSH; Future; Expected date: 05/13/2019  Pruritus  -     triamcinolone acetonide 0.1% (KENALOG) 0.1 % cream; AAA of rash and itching bid prn. Strong steroid- do not apply to face or folds of skin.  Dispense: 45 g; Refill: 1  -     levocetirizine (XYZAL) 5 MG tablet; Take 1 tablet each morning PRN itching.  Dispense: 30 tablet; Refill: 0  -     hydrOXYzine HCl (ATARAX) 10 MG Tab; Take 1 tablet (10 mg total) by mouth nightly as needed. May cause drowsiness.  Do not drive or operate heavy machinery.  Dispense: 30 tablet; Refill: 0  -     Comprehensive metabolic panel; Future; Expected date: 05/13/2019  -     CBC auto differential; Future; Expected date: 05/13/2019  -     TSH; Future; Expected date: 05/13/2019    Will check above labs and start above meds. Cautioned about risk of falls and drowsiness with hydroxyzine and xyzal.  He understands not to apply TAC to biopsy sites. Avoid any IM steroids today due to DM and poor blood sugar control (A1C > 8.7)      PROCEDURE NOTE -- PUNCH BIOPSY  Location: right chest    After risks, benefits, and alternatives were discussed with the patient, the patient agrees to the procedure by verbal consent. The area(s) is cleansed with alcohol. A total of 1 cc of lidocaine 1% with epinephrine and sodium bicarbonate was injected for local anesthesia. A 3 mm punch biopsy was used to remove part or all of the lesion(s). The specimen was sent for tissue pathology. The defect(s) was then packed with gelfoam. The area was dressed with antibiotic ointment and bandaged. The patient tolerated the procedure well without adverse events.  Wound care instructions were given to the patient on the AVS.  The patient will be notified of pathology results once available. Results will also be available in Epic.      PROCEDURE NOTE -- PUNCH BIOPSY  Location: right forearm    After risks, benefits, and alternatives were discussed with the patient, the  patient agrees to the procedure by verbal consent. The area(s) is cleansed with alcohol. A total of 1 cc of lidocaine 1% with epinephrine and sodium bicarbonate was injected for local anesthesia. A 3 mm punch biopsy was used to remove part or all of the lesion(s). The specimen was sent for tissue pathology. The defect(s) was then packed with gelfoam. The area was dressed with antibiotic ointment and bandaged. The patient tolerated the procedure well without adverse events.  Wound care instructions were given to the patient on the AVS.  The patient will be notified of pathology results once available. Results will also be available in Epic.    Will schedule for f/u with MD in 3 weeks.          Follow up in about 3 weeks (around 6/3/2019) for to see Dermatology MD.

## 2019-05-13 NOTE — PATIENT INSTRUCTIONS
Punch Biopsy Wound Care    Your PA has performed a punch biopsy today.  A band aid and antibiotic ointment has been placed over the site.  This should remain in place for 24 hours.  It is recommended that you keep the area dry for the first 24 hours.  After 24 hours, you may remove the band aid and wash the area with warm soap and water and apply Vaseline jelly.  Many patients prefer to use Neosporin or Bacitracin ointment.  This is acceptable; however know that you can develop an allergy to this medication even if you have used it safely for years.  It is important to keep the area moist.  Letting it dry out and get air slows healing time, will worsen the scar, and make it more difficult to remove the stitches if they were placed.  Band aid is optional after first 24 hours.      If you notice increasing redness, tenderness, pain, or yellow drainage at the biopsy or surgical site, please notify your doctor.  These are signs of an infection.    If your biopsy/surgical site is bleeding, apply firm pressure for 15 minutes straight.  Repeat for another 15 minutes, if it is still bleeding.   If the surgical site continues to bleed, then please contact your doctor.      [facility] - [facility]   DERMATOLOGY  [address]   [place] [address]   Dept: [phone]   Dept Fax: [phone]

## 2019-05-16 ENCOUNTER — OFFICE VISIT (OUTPATIENT)
Dept: OPHTHALMOLOGY | Facility: CLINIC | Age: 64
End: 2019-05-16
Payer: MEDICARE

## 2019-05-16 DIAGNOSIS — H40.1132 PRIMARY OPEN ANGLE GLAUCOMA (POAG) OF BOTH EYES, MODERATE STAGE: Primary | ICD-10-CM

## 2019-05-16 DIAGNOSIS — H40.1134 PRIMARY OPEN ANGLE GLAUCOMA OF BOTH EYES, INDETERMINATE STAGE: ICD-10-CM

## 2019-05-16 PROCEDURE — 92012 INTRM OPH EXAM EST PATIENT: CPT | Mod: HCNC,S$GLB,, | Performed by: OPTOMETRIST

## 2019-05-16 PROCEDURE — 92133 POSTERIOR SEGMENT OCT OPTIC NERVE(OCULAR COHERENCE TOMOGRAPHY) - OU - BOTH EYES: ICD-10-PCS | Mod: HCNC,S$GLB,, | Performed by: OPTOMETRIST

## 2019-05-16 PROCEDURE — 92012 PR EYE EXAM, EST PATIENT,INTERMED: ICD-10-PCS | Mod: HCNC,S$GLB,, | Performed by: OPTOMETRIST

## 2019-05-16 PROCEDURE — 99999 PR PBB SHADOW E&M-EST. PATIENT-LVL II: CPT | Mod: PBBFAC,HCNC,, | Performed by: OPTOMETRIST

## 2019-05-16 PROCEDURE — 99999 PR PBB SHADOW E&M-EST. PATIENT-LVL II: ICD-10-PCS | Mod: PBBFAC,HCNC,, | Performed by: OPTOMETRIST

## 2019-05-16 PROCEDURE — 92083 HUMPHREY VISUAL FIELD - OU - BOTH EYES: ICD-10-PCS | Mod: HCNC,S$GLB,, | Performed by: OPTOMETRIST

## 2019-05-16 PROCEDURE — 92083 EXTENDED VISUAL FIELD XM: CPT | Mod: HCNC,S$GLB,, | Performed by: OPTOMETRIST

## 2019-05-16 PROCEDURE — 92133 CPTRZD OPH DX IMG PST SGM ON: CPT | Mod: HCNC,S$GLB,, | Performed by: OPTOMETRIST

## 2019-05-16 RX ORDER — LATANOPROST 50 UG/ML
1 SOLUTION/ DROPS OPHTHALMIC NIGHTLY
Qty: 1 BOTTLE | Refills: 4 | Status: SHIPPED | OUTPATIENT
Start: 2019-05-16 | End: 2019-10-15 | Stop reason: SDUPTHER

## 2019-05-16 NOTE — PROGRESS NOTES
HPI     Last [facility] visit 01/23/2019  Glaucoma  Medication: Latanoprost 0.005% 1 drop OU QHS   Last HVF/GOCT (Today)  DFE (Today)  Diabetic/IDDM      Last edited by Kemal Bashir MA on 5/16/2019  9:11 AM. (History)            Assessment /Plan     For exam results, see Encounter Report.    Primary open angle glaucoma (POAG) of both eyes, moderate stage      HVF were better today OU than the last one in January.  No changes in GOCT OU since January.  I will keep him on latanoprost  For now-one drop OU qhs.  RTC 3 months for IOP check.    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  I see no reason to change meds at this time.  RTC 3 months for IOP check.  Full testing in 6 months.    OCT:  NFL loss worse in the OD but little change since 2017.  I will repeat in 6 months.  Continue with latanoprost OU qhs.    IOP OK  Today.  RTC 3 months for IOP check.

## 2019-05-22 ENCOUNTER — TELEPHONE (OUTPATIENT)
Dept: RADIOLOGY | Facility: HOSPITAL | Age: 64
End: 2019-05-22

## 2019-05-27 DIAGNOSIS — M47.26 OSTEOARTHRITIS OF SPINE WITH RADICULOPATHY, LUMBAR REGION: ICD-10-CM

## 2019-05-27 DIAGNOSIS — M51.26 HNP (HERNIATED NUCLEUS PULPOSUS), LUMBAR: ICD-10-CM

## 2019-05-27 DIAGNOSIS — E11.3299 TYPE 2 DIABETES MELLITUS WITH MILD NONPROLIFERATIVE RETINOPATHY, WITH LONG-TERM CURRENT USE OF INSULIN, MACULAR EDEMA PRESENCE UNSPECIFIED, UNSPECIFIED LATERALITY: Primary | ICD-10-CM

## 2019-05-27 DIAGNOSIS — Z79.4 TYPE 2 DIABETES MELLITUS WITH MILD NONPROLIFERATIVE RETINOPATHY, WITH LONG-TERM CURRENT USE OF INSULIN, MACULAR EDEMA PRESENCE UNSPECIFIED, UNSPECIFIED LATERALITY: Primary | ICD-10-CM

## 2019-05-28 ENCOUNTER — CLINICAL SUPPORT (OUTPATIENT)
Dept: PULMONOLOGY | Facility: CLINIC | Age: 64
End: 2019-05-28
Payer: MEDICARE

## 2019-05-28 ENCOUNTER — OFFICE VISIT (OUTPATIENT)
Dept: PULMONOLOGY | Facility: CLINIC | Age: 64
End: 2019-05-28
Payer: MEDICARE

## 2019-05-28 VITALS
DIASTOLIC BLOOD PRESSURE: 68 MMHG | WEIGHT: 196.38 LBS | RESPIRATION RATE: 16 BRPM | HEART RATE: 86 BPM | OXYGEN SATURATION: 99 % | BODY MASS INDEX: 32.72 KG/M2 | HEIGHT: 65 IN | SYSTOLIC BLOOD PRESSURE: 130 MMHG

## 2019-05-28 DIAGNOSIS — L30.9 DERMATITIS: ICD-10-CM

## 2019-05-28 DIAGNOSIS — F51.04 PSYCHOPHYSIOLOGICAL INSOMNIA: ICD-10-CM

## 2019-05-28 DIAGNOSIS — K21.9 SLEEP RELATED GASTROESOPHAGEAL REFLUX DISEASE: ICD-10-CM

## 2019-05-28 DIAGNOSIS — J45.40 MODERATE PERSISTENT ASTHMA WITHOUT COMPLICATION: ICD-10-CM

## 2019-05-28 DIAGNOSIS — E66.9 OBESITY (BMI 30.0-34.9): ICD-10-CM

## 2019-05-28 DIAGNOSIS — G47.33 OSA ON CPAP: ICD-10-CM

## 2019-05-28 DIAGNOSIS — J45.40 MODERATE PERSISTENT ASTHMA WITHOUT COMPLICATION: Primary | ICD-10-CM

## 2019-05-28 DIAGNOSIS — I50.22 CHRONIC SYSTOLIC CONGESTIVE HEART FAILURE: ICD-10-CM

## 2019-05-28 DIAGNOSIS — I48.0 PAF (PAROXYSMAL ATRIAL FIBRILLATION): ICD-10-CM

## 2019-05-28 PROBLEM — I50.43 ACUTE ON CHRONIC COMBINED SYSTOLIC AND DIASTOLIC HEART FAILURE: Status: ACTIVE | Noted: 2018-05-11

## 2019-05-28 LAB
BRPFT: NORMAL
FEF 25 75 LLN: 0.88
FEF 25 75 PRE REF: 126.6 %
FEF 25 75 REF: 2.16
FEV1 FVC LLN: 67
FEV1 FVC PRE REF: 105.8 %
FEV1 FVC REF: 79
FEV1 LLN: 1.78
FEV1 PRE REF: 81.4 %
FEV1 REF: 2.47
FVC LLN: 2.31
FVC PRE REF: 77.1 %
FVC REF: 3.13
PEF LLN: 4.77
PEF PRE REF: 102.5 %
PEF REF: 7
PRE FEF 25 75: 2.74 L/S (ref 0.88–3.44)
PRE FET 100: 7.75 SEC
PRE FEV1 FVC: 83.49 % (ref 66.87–90.9)
PRE FEV1: 2.01 L (ref 1.78–3.17)
PRE FVC: 2.41 L (ref 2.31–3.95)
PRE PEF: 7.17 L/S (ref 4.77–9.23)

## 2019-05-28 PROCEDURE — 99214 PR OFFICE/OUTPT VISIT, EST, LEVL IV, 30-39 MIN: ICD-10-PCS | Mod: 25,S$PBB,HCNC, | Performed by: NURSE PRACTITIONER

## 2019-05-28 PROCEDURE — 99999 PR PBB SHADOW E&M-EST. PATIENT-LVL V: CPT | Mod: PBBFAC,HCNC,, | Performed by: NURSE PRACTITIONER

## 2019-05-28 PROCEDURE — 94010 BREATHING CAPACITY TEST: ICD-10-PCS | Mod: HCNC,S$GLB,, | Performed by: INTERNAL MEDICINE

## 2019-05-28 PROCEDURE — 94010 BREATHING CAPACITY TEST: CPT | Mod: HCNC,S$GLB,, | Performed by: INTERNAL MEDICINE

## 2019-05-28 PROCEDURE — 99214 OFFICE O/P EST MOD 30 MIN: CPT | Mod: 25,S$PBB,HCNC, | Performed by: NURSE PRACTITIONER

## 2019-05-28 PROCEDURE — 99999 PR PBB SHADOW E&M-EST. PATIENT-LVL V: ICD-10-PCS | Mod: PBBFAC,HCNC,, | Performed by: NURSE PRACTITIONER

## 2019-05-28 RX ORDER — INSULIN ASPART 100 [IU]/ML
INJECTION, SOLUTION INTRAVENOUS; SUBCUTANEOUS
Qty: 15 ML | Refills: 0 | Status: SHIPPED | OUTPATIENT
Start: 2019-05-28 | End: 2020-03-10 | Stop reason: SDUPTHER

## 2019-05-28 NOTE — ASSESSMENT & PLAN NOTE
Remains not compliant with auto CPAP 6-14 cm with nasal mask  [place] [address]   Average AHI 4.6  Auto-CPAP Summary  Auto-CPAP Mean Pressure 7.2 cmH2O  Auto-CPAP Peak Average Pressure 7.5 cmH2O  Average Device Pressure <= 90% of Time 9.3 cmH2O  Average Time in Large Leak Per Day 18 mins. 30 secs.  Adherence  [facility]: [facility]

## 2019-05-28 NOTE — ASSESSMENT & PLAN NOTE
Working on control, A1C 10.8 March 2019, followed diabetes mgmt and primary care provider, Dr. Lambert

## 2019-05-28 NOTE — PROGRESS NOTES
Subjective:      Patient ID: Crow Green is a 63 y.o. male.    Chief Complaint: Asthma and Sleep Apnea    HPI: Crow Green is here for follow up for SANDRITA and CPAP complaince assessment. He is on Auto CPAP of 6-14 cmH2O pressure which is  optimally controlling sleep apnea with apneic index (AHI) 3.0 events an hour.   He is not compliant with CPAP use. Complaince download today reveals 3.3% of days with greater than 4 hours of device use. Patient tried to use CPAP since Jan 2019, developed diffuse rash, so head gear increased itching so stopped use again. Under care of dermatology for rash. Reports benefit when CPAP used. Nasal mask is used and well tolerated.   [place] 16    Asthma  Reported as well controlled on current regimen.   Advair, Spiriva handihaler, albuterol inhaler, combivent inhaler. Has not needed rescue inhalers in past 2 months.  ACT score 18 score of 18 since using albuterol inhaler 3 times a day. Has reduced advair to once a day and he stopped spiriva about 3 months ago after last visit in Jan. He no longer has combivent.   Denies wheezing or coughing. Symptoms when using albuterol feels hot temperature affects breathing when outside, albuterol helps mild sensation of shortness of breath when in heat.      Previous Report Reviewed: lab reports and office notes     Past Medical History: The following portions of the patient's history were reviewed and updated as appropriate:   He  has a past surgical history that includes Cholecystectomy.  His family history includes Cataracts in his father, mother, and sister; Glaucoma in his maternal aunt, mother, and sister; No Known Problems in his brother, daughter, and son.  He  reports that he has never smoked. He has never used smokeless tobacco. He reports that he does not drink alcohol or use drugs.  He has a current medication list which includes the following prescription(s): alcohol antiseptic pads, amlodipine, ammonium lactate,  atorvastatin, baclofen, butalbital-acetaminophen-caffeine -40 mg, clotrimazole, eliquis, furosemide, gabapentin, glimepiride, hydrocodone-acetaminophen, hydroxyzine hcl, inhalation spacing device, insulin, insulin aspart u-100, insulin syringe-needle u-100, ipratropium-albuterol, isosorbide mononitrate, latanoprost, levocetirizine, lisinopril, metformin, metolazone, metoprolol succinate, nitroglycerin, pantoprazole, pen needle, diabetic, sertraline, trazodone, triamcinolone acetonide 0.1%, true metrix air glucose meter, ventolin hfa, aspirin, fluticasone-salmeterol 250-50 mcg/dose, and tiotropium.  He has No Known Allergies..    The following portions of the patient's history were reviewed and updated as appropriate: allergies, current medications, past family history, past medical history, past social history, past surgical history and problem list.    Review of Systems   Constitutional: Negative for fever, chills, weight loss, weight gain, activity change, appetite change, fatigue and night sweats.   HENT: Negative for postnasal drip, rhinorrhea, sinus pressure, voice change and congestion.    Eyes: Negative for redness and itching.   Respiratory: Negative for snoring, cough, sputum production, chest tightness, shortness of breath, wheezing, orthopnea, asthma nighttime symptoms, dyspnea on extertion, use of rescue inhaler and somnolence.    Cardiovascular: Negative.  Negative for chest pain, palpitations and leg swelling.   Genitourinary: Negative for difficulty urinating and hematuria.   Endocrine: Negative for cold intolerance and heat intolerance.    Musculoskeletal: Negative for arthralgias, gait problem, joint swelling and myalgias.   Skin: Positive for rash (diffuse, under care of dermatology).   Gastrointestinal: Negative for nausea, vomiting, abdominal pain and acid reflux.   Neurological: Negative for dizziness, weakness, light-headedness and headaches.   Hematological: Negative for adenopathy. No  "excessive bruising.   All other systems reviewed and are negative.     Objective:   /68   Pulse 86   Resp 16   Ht 5' 5" (1.651 m)   Wt 89.1 kg (196 lb 6.4 oz)   SpO2 99%   BMI 32.68 kg/m²   Physical Exam   Constitutional: He is oriented to person, place, and time. He appears well-developed and well-nourished. He is active and cooperative.  Non-toxic appearance. He does not appear ill. No distress.   HENT:   Head: Normocephalic and atraumatic.   Right Ear: External ear normal.   Left Ear: External ear normal.   Nose: Nose normal.   Mouth/Throat: Oropharynx is clear and moist. No oropharyngeal exudate.   Eyes: Conjunctivae are normal.   Neck: Normal range of motion. Neck supple.   Cardiovascular: Normal rate, regular rhythm, normal heart sounds and intact distal pulses.   Pulmonary/Chest: Effort normal and breath sounds normal. He has no wheezes. He has no rales.   Abdominal: Soft.   Musculoskeletal: He exhibits no edema.   Neurological: He is alert and oriented to person, place, and time.   Skin: Skin is warm and dry. Capillary refill takes less than 2 seconds. Rash (diffuse dry, pink rash ) noted.   Psychiatric: He has a normal mood and affect. His behavior is normal. Judgment and thought content normal.   Vitals reviewed.    Personal Diagnostic Review  CPAP download  APAP 6-14 cm  Compliance Summary  2/12/2019 - 3/13/2019 (30 days)  Days with Device Usage 6 days  Days without Device Usage 24 days  Percent Days with Device Usage 20.0%  Cumulative Usage 9 hrs. 4 mins. 59 secs.  Maximum Usage (1 Day) 7 hrs. 41 mins. 47 secs.  Average Usage (All Days) 18 mins. 9 secs.  Average Usage (Days Used) 1 hrs. 30 mins. 49 secs.  Minimum Usage (1 Day) 4 mins. 12 secs.  Percent of Days with Usage >= 4 Hours 3.3%  Percent of Days with Usage < 4 Hours 96.7%  Date Range  Average AHI 4.6  Auto-CPAP Summary  Auto-CPAP Mean Pressure 7.2 cmH2O  Auto-CPAP Peak Average Pressure 7.5 cmH2O  Average Device Pressure <= 90% of Time " 9.3 cmH2O  Average Time in Large Leak Per Day 18 mins. 30 secs.    Assessment:     1. Moderate persistent asthma without complication    2. SANDRITA on CPAP    3. Psychophysiological insomnia    4. PAF (paroxysmal atrial fibrillation)    5. Obesity (BMI 30.0-34.9)    6. Chronic systolic congestive heart failure    7. 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 Sleep related gastroesophageal reflux disease    9. Dermatitis      Orders Placed This Encounter   Procedures    X-Ray Chest PA And Lateral     Standing Status:   Future     Standing Expiration Date:   5/28/2020     Order Specific Question:   May the Radiologist modify the order per protocol to meet the clinical needs of the patient?     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 Lambert          Sleep related gastroesophageal reflux disease     Controlled on protonix 40 mg nightly         Psychophysiological insomnia     Insomnia improved up until developed diffuse rash over past 2-3 months, under care dermatology         PAF (paroxysmal atrial fibrillation)     Followed by cardiology   ekg 1/3/2019 Sinus rhythm with Premature atrial complexes         SANDRITA on CPAP       Remains not compliant with auto CPAP 6-14 cm with nasal mask  [place] 16   Average AHI 4.6  Auto-CPAP Summary  Auto-CPAP Mean Pressure 7.2 cmH2O  Auto-CPAP Peak Average Pressure 7.5 cmH2O  Average Device Pressure <= 90% of Time 9.3 cmH2O  Average Time in Large Leak Per Day 18 mins. 30 secs.  Adherence  HME: [facility]             Obesity (BMI 30.0-34.9)     Encouraged calorie reduction and 30 minutes of exercise daily. Discussed impact of obesity on general health.  still eating salads, fish, cutting fried foods.   Walking program most days  Active with [employer] program         Chronic systolic congestive heart failure     Followed by cardiology, no shortness of breath, no peripheral edema. Lung clear          Asthma - Primary     Controlled, using albuterol 3 times a day. advair 250 mcg once a day, he stopped spiriva 1/2019, did not think he needed.   No longer has combivent.   Spirometry 5/28/2019 Normal airflow FEV 1 81.4, took albuterol prior.   Compliance with advair 250 mcg twice daily. Add back spiriva if needed.  Albuterol inhaler or nebs if needed.               Relevant Orders    Spirometry with/without bronchodilator    X-Ray Chest PA And Lateral      Other Visit Diagnoses     Dermatitis        chronic x 3 months, undercare of dermatology          (DME) - [facility]  Reviewed therapeutic goals for positive airway pressure therapy Auto CPAP  Ideal is usage 100% of nights for 6 - 8 hours per night. Minimum usage is 70% of night for at least 4 hours per night used.     Follow up in about 1 year (around 5/28/2020) for Asthma, review mark/cxr. CPAP 1 year compliance download.

## 2019-05-28 NOTE — ASSESSMENT & PLAN NOTE
Controlled, using albuterol 3 times a day. advair 250 mcg once a day, he stopped spiriva 1/2019, did not think he needed.   No longer has combivent.   Spirometry 5/28/2019 Normal airflow FEV 1 81.4, took albuterol prior.   Compliance with advair 250 mcg twice daily. Add back spiriva if needed.  Albuterol inhaler or nebs if needed.

## 2019-05-28 NOTE — ASSESSMENT & PLAN NOTE
Encouraged calorie reduction and 30 minutes of exercise daily. Discussed impact of obesity on general health.  still eating salads, fish, cutting fried foods.   Walking program most days  Active with [employer] program

## 2019-05-29 RX ORDER — GABAPENTIN 800 MG/1
800 TABLET ORAL 3 TIMES DAILY
Qty: 270 TABLET | Refills: 0 | Status: SHIPPED | OUTPATIENT
Start: 2019-05-29 | End: 2019-07-29 | Stop reason: SDUPTHER

## 2019-05-29 RX ORDER — INSULIN PUMP SYRINGE, 3 ML
EACH MISCELLANEOUS
Qty: 1 EACH | Refills: 0 | Status: SHIPPED | OUTPATIENT
Start: 2019-05-29 | End: 2021-08-03

## 2019-06-05 ENCOUNTER — TELEPHONE (OUTPATIENT)
Dept: RADIOLOGY | Facility: HOSPITAL | Age: 64
End: 2019-06-05

## 2019-06-07 ENCOUNTER — OFFICE VISIT (OUTPATIENT)
Dept: PODIATRY | Facility: CLINIC | Age: 64
End: 2019-06-07
Payer: MEDICARE

## 2019-06-07 DIAGNOSIS — B35.1 DERMATOPHYTOSIS OF NAIL: ICD-10-CM

## 2019-06-07 DIAGNOSIS — E11.49 TYPE II DIABETES MELLITUS WITH NEUROLOGICAL MANIFESTATIONS: Primary | ICD-10-CM

## 2019-06-07 PROCEDURE — 11721 PR DEBRIDEMENT OF NAILS, 6 OR MORE: ICD-10-PCS | Mod: Q9,S$PBB,HCNC, | Performed by: PODIATRIST

## 2019-06-07 PROCEDURE — 99499 NO LOS: ICD-10-PCS | Mod: S$PBB,HCNC,, | Performed by: PODIATRIST

## 2019-06-07 PROCEDURE — 11721 DEBRIDE NAIL 6 OR MORE: CPT | Mod: Q9,S$PBB,HCNC, | Performed by: PODIATRIST

## 2019-06-07 PROCEDURE — 99499 UNLISTED E&M SERVICE: CPT | Mod: S$PBB,HCNC,, | Performed by: PODIATRIST

## 2019-06-07 PROCEDURE — 99999 PR PBB SHADOW E&M-EST. PATIENT-LVL II: CPT | Mod: PBBFAC,HCNC,, | Performed by: PODIATRIST

## 2019-06-07 PROCEDURE — 99999 PR PBB SHADOW E&M-EST. PATIENT-LVL II: ICD-10-PCS | Mod: PBBFAC,HCNC,, | Performed by: PODIATRIST

## 2019-06-07 PROCEDURE — 11721 DEBRIDE NAIL 6 OR MORE: CPT | Mod: Q9,PBBFAC,HCNC | Performed by: PODIATRIST

## 2019-06-12 DIAGNOSIS — L30.9 DERMATITIS: ICD-10-CM

## 2019-06-12 DIAGNOSIS — L29.9 PRURITUS: ICD-10-CM

## 2019-06-12 RX ORDER — LEVOCETIRIZINE DIHYDROCHLORIDE 5 MG/1
TABLET, FILM COATED ORAL
Qty: 30 TABLET | Refills: 0 | OUTPATIENT
Start: 2019-06-12

## 2019-06-12 RX ORDER — TRIAMCINOLONE ACETONIDE 1 MG/G
CREAM TOPICAL
Qty: 45 G | Refills: 1 | OUTPATIENT
Start: 2019-06-12

## 2019-06-12 NOTE — TELEPHONE ENCOUNTER
Called pt to inform pt that kenalog rx has 1 refill and he will need to contact pharmacy for refill also informed him that the xyzal can not be refilled with out follow up.    Pt would like to have rx sent to [facility]

## 2019-06-13 ENCOUNTER — PATIENT OUTREACH (OUTPATIENT)
Dept: ADMINISTRATIVE | Facility: HOSPITAL | Age: 64
End: 2019-06-13

## 2019-06-17 ENCOUNTER — TELEPHONE (OUTPATIENT)
Dept: NEUROSURGERY | Facility: CLINIC | Age: 64
End: 2019-06-17

## 2019-06-17 NOTE — TELEPHONE ENCOUNTER
Phoned pt to inform him that we have to reschedule his appt for tomorrow until after he gets his MRI done, pt agreed. Pt was rescheduled to 7/3 at 11:30 to see [facility] at [facility] [place] [facility].Pt agreed to a time and date before the call got disconnected, I left a message after calling with the date and time of f/u appt

## 2019-06-17 NOTE — PROGRESS NOTES
Subjective:     Patient ID: Crow Green is a 63 y.o. male.    Chief Complaint: Nail Care (RNC. No noted pain. Diabetic Pt. Pt wearing tennis shoes w/ socks. PCP DR Newman, last seen March 2019.)    Crow is a 63 y.o. male who presents to the clinic for evaluation and treatment of high risk feet. Crow has a past medical history of Acute kidney injury, Arthritis, Asthma, Atrial fibrillation, CHF (congestive heart failure), Depression, Diabetes mellitus, type 2 (Diagnosed in 2000), Elevated PSA, Hypertension, and Sleep apnea. The patient's chief complaint is long, thick toenails.  This patient has documented high risk feet requiring routine maintenance secondary to diabetes mellitis and those secondary complications of diabetes, as mentioned..    PCP: Mateo Lambert, DO    Date Last Seen by PCP: 03/20/19    Current shoe gear:  Affected Foot: Tennis shoes     Unaffected Foot: Tennis shoes    Hemoglobin A1C   Date Value Ref Range Status   03/20/2019 10.8 (H) 4.0 - 5.6 % Final     Comment:     ADA Screening Guidelines:  5.7-6.4%  Consistent with prediabetes  >or=6.5%  Consistent with diabetes  High levels of fetal hemoglobin interfere with the HbA1C  assay. Heterozygous hemoglobin variants (HbS, HgC, etc)do  not significantly interfere with this assay.   However, presence of multiple variants may affect accuracy.     12/10/2018 8.7 (H) 4.0 - 5.6 % Final     Comment:     ADA Screening Guidelines:  5.7-6.4%  Consistent with prediabetes  >or=6.5%  Consistent with diabetes  High levels of fetal hemoglobin interfere with the HbA1C  assay. Heterozygous hemoglobin variants (HbS, HgC, etc)do  not significantly interfere with this assay.   However, presence of multiple variants may affect accuracy.     09/13/2018 8.0 (H) 4.0 - 5.6 % Final     Comment:     ADA Screening Guidelines:  5.7-6.4%  Consistent with prediabetes  >or=6.5%  Consistent with diabetes  High levels of fetal hemoglobin interfere with the  HbA1C  assay. Heterozygous hemoglobin variants (HbS, HgC, etc)do  not significantly interfere with this assay.   However, presence of multiple variants may affect accuracy.             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, with long-term current use of insulin    SANDRITA on CPAP    Asthma    Psychophysiological insomnia    Nightmares    Sleep related gastroesophageal reflux disease    Inadequate sleep hygiene    PAF (paroxysmal atrial fibrillation)    At risk for medication noncompliance    Obesity (BMI 30.0-34.9)    Indeterminate stage chronic open angle glaucoma    Elevated troponin    Right hip pain    Gait instability    Chronic right shoulder pain    Arthritis    Left sided sciatica    Dyspnea on exertion    Osteoarthritis of spine with radiculopathy, lumbar region    HNP (herniated nucleus pulposus), lumbar    Gastroesophageal reflux disease without esophagitis    Acute on chronic combined systolic and diastolic heart failure    Hypogonadism in male       Medication List with Changes/Refills   Current Medications    ALCOHOL ANTISEPTIC PADS (ALCOHOL PREP PADS TOP)        AMLODIPINE (NORVASC) 10 MG TABLET    TAKE 1 TABLET ONE TIME DAILY    AMMONIUM LACTATE 12 % CREA    Apply 1 Act topically 2 (two) times daily.    ASPIRIN (ECOTRIN) 81 MG EC TABLET    Take 1 tablet (81 mg total) by mouth once daily.    ATORVASTATIN (LIPITOR) 40 MG TABLET    Take 1 tablet (40 mg total) by mouth every evening.    BACLOFEN (LIORESAL) 10 MG TABLET    Take 1 tablet (10 mg total) by mouth once daily.     BLOOD-GLUCOSE METER (TRUE METRIX AIR GLUCOSE METER) KIT    TEST FOUR TIMES DAILY BEFORE MEALS  AND EVERY NIGHT    CLOTRIMAZOLE (LOTRIMIN) 1 % CREAM    Apply topically 2 (two) times daily. Apply to outer ears    ELIQUIS 5 MG TAB        FLUTICASONE-SALMETEROL 250-50 MCG/DOSE (ADVAIR DISKUS) 250-50 MCG/DOSE DISKUS INHALER    Inhale 1 puff into the lungs 2 (two) times daily. Controller    FUROSEMIDE (LASIX) 40 MG TABLET    Take 2 tablets (80 mg total) by mouth every morning AND 1 tablet (40 mg total) every evening.    GABAPENTIN (NEURONTIN) 800 MG TABLET    Take 1 tablet (800 mg total) by mouth 3 (three) times daily.    GLIMEPIRIDE (AMARYL) 2 MG TABLET    Take 1 tablet (2 mg total) by mouth before breakfast.    HYDROCODONE-ACETAMINOPHEN (NORCO) 5-325 MG PER TABLET    Take 1 tablet by mouth every 6 (six) hours as needed for Pain.    INHALATION SPACING DEVICE    Use as directed for inhalation.    INSULIN (LANTUS SOLOSTAR U-100 INSULIN) GLARGINE 100 UNITS/ML (3ML) SUBQ PEN    Inject 70 Units into the skin every evening.    INSULIN ASPART U-100 (NOVOLOG FLEXPEN U-100 INSULIN) 100 UNIT/ML (3 ML) INPN PEN    INJECT 20 UNITS SUBCUTANEOUSLY THREE TIMES DAILY WITH MEALS    INSULIN SYRINGE-NEEDLE U-100 1 ML 31 GAUGE X 5/16 SYRG        IPRATROPIUM-ALBUTEROL (COMBIVENT)  MCG/ACTUATION INHALER    Inhale 1 puff into the lungs 4 (four) times daily. Rescue    ISOSORBIDE MONONITRATE (IMDUR) 60 MG 24 HR TABLET    Take 1 tablet (60 mg total) by mouth once daily.    LATANOPROST 0.005 % OPHTHALMIC SOLUTION    Place 1 drop into both eyes every evening.    LEVOCETIRIZINE (XYZAL) 5 MG TABLET    Take 1 tablet each morning PRN itching.    LISINOPRIL 10 MG TABLET    Take 1 tablet (10 mg total) by mouth once daily.    METFORMIN (GLUCOPHAGE) 1000 MG TABLET    Take 1 tablet (1,000 mg total) by mouth 2 (two) times daily with meals.    METOLAZONE (ZAROXOLYN) 2.5 MG TABLET    Take 1 tablet (2.5 mg total) by mouth once daily.    METOPROLOL  "SUCCINATE (TOPROL-XL) 50 MG 24 HR TABLET    TAKE 1 TABLET EVERY DAY    NITROGLYCERIN (NITROSTAT) 0.3 MG SL TABLET    PLACE 1 TABLET UNDER THE TONGUE EVERY 5 MINUTES AS NEEDED FOR CHEST PAIN, NO MORE THAN 3 TABLETS IN ONE DAY    PANTOPRAZOLE (PROTONIX) 40 MG TABLET    Take 1 tablet (40 mg total) by mouth once daily.    PEN NEEDLE, DIABETIC (BD INSULIN PEN NEEDLE UF SHORT) 31 GAUGE X 5/16" NDLE    Inject 1 each into the skin 3 (three) times daily.    SERTRALINE (ZOLOFT) 50 MG TABLET    TAKE 1 TABLET ONE TIME DAILY    TIOTROPIUM (SPIRIVA WITH HANDIHALER) 18 MCG INHALATION CAPSULE    Inhale 1 capsule (18 mcg total) into the lungs once daily. Controller    TRAZODONE (DESYREL) 150 MG TABLET    TAKE 1 TABLET EVERY NIGHT    TRIAMCINOLONE ACETONIDE 0.1% (KENALOG) 0.1 % CREAM    AAA of rash and itching bid prn. Strong steroid- do not apply to face or folds of skin.    VENTOLIN HFA 90 MCG/ACTUATION INHALER    Inhale 2 puffs into the lungs every 4 (four) hours as needed for Wheezing.       Review of patient's allergies indicates:  No Known Allergies    Past Surgical History:   Procedure Laterality Date    BIOPSY-ARTERY-TEMPORAL Left 9/1/2017    [facility] at [facility]    CHOLECYSTECTOMY      CHOLECYSTECTOMY-LAPAROSCOPIC N/A 3/17/2017    Performed by Louis O. Jeansonne IV, MD at [facility] OR       Family History   Problem Relation Age of Onset    Glaucoma Mother     Cataracts Mother     Cataracts Father     Glaucoma Sister     Cataracts Sister     Glaucoma Maternal Aunt     No Known Problems Brother     No Known Problems Daughter     No Known Problems Son     Prostate cancer Neg Hx        Social History     Socioeconomic History    Marital status: Legally      Spouse name: Not on file    Number of children: 5    Years of education: Not on file    Highest education level: Not on file   Occupational History    Occupation: disabled   Social Needs    Financial resource strain: Not on file    " Food insecurity:     Worry: Not on file     Inability: Not on file    Transportation needs:     Medical: Not on file     Non-medical: Not on file   Tobacco Use    Smoking status: Never Smoker    Smokeless tobacco: Never Used   Substance and Sexual Activity    Alcohol use: No    Drug use: No    Sexual activity: Not Currently   Lifestyle    Physical activity:     Days per week: Not on file     Minutes per session: Not on file    Stress: Not on file   Relationships    Social connections:     Talks on phone: Not on file     Gets together: Not on file     Attends [employer] service: Not on file     Active member of club or organization: Not on file     Attends meetings of clubs or organizations: Not on file     Relationship status: Not on file   Other Topics Concern    Not on file   Social History Narrative    Not on file       There were no vitals filed for this visit.    Hemoglobin A1C   Date Value Ref Range Status   03/20/2019 10.8 (H) 4.0 - 5.6 % Final     Comment:     ADA Screening Guidelines:  5.7-6.4%  Consistent with prediabetes  >or=6.5%  Consistent with diabetes  High levels of fetal hemoglobin interfere with the HbA1C  assay. Heterozygous hemoglobin variants (HbS, HgC, etc)do  not significantly interfere with this assay.   However, presence of multiple variants may affect accuracy.     12/10/2018 8.7 (H) 4.0 - 5.6 % Final     Comment:     ADA Screening Guidelines:  5.7-6.4%  Consistent with prediabetes  >or=6.5%  Consistent with diabetes  High levels of fetal hemoglobin interfere with the HbA1C  assay. Heterozygous hemoglobin variants (HbS, HgC, etc)do  not significantly interfere with this assay.   However, presence of multiple variants may affect accuracy.     09/13/2018 8.0 (H) 4.0 - 5.6 % Final     Comment:     ADA Screening Guidelines:  5.7-6.4%  Consistent with prediabetes  >or=6.5%  Consistent with diabetes  High levels of fetal hemoglobin interfere with the HbA1C  assay. Heterozygous  hemoglobin variants (HbS, HgC, etc)do  not significantly interfere with this assay.   However, presence of multiple variants may affect accuracy.         Review of Systems   Constitutional: Negative for chills and fever.   Respiratory: Negative for shortness of breath.    Cardiovascular: Negative for chest pain, palpitations, orthopnea, claudication and leg swelling.   Gastrointestinal: Negative for diarrhea, nausea and vomiting.   Musculoskeletal: Negative for joint pain.   Skin: Negative for rash.   Neurological: Positive for tingling and sensory change. Negative for dizziness, focal weakness and weakness.   Psychiatric/Behavioral: Negative.          Objective:      PHYSICAL EXAM: Apperance: Alert and orient in no distress,well developed, and with good attention to grooming and body habits  Patient presents ambulating in tennis shoes.   LOWER EXTREMITY EXAM:  VASCULAR: Dorsalis pedis pulses 2/4 bilateral and Posterior Tibial pulses 2/4 bilateral. Capillary fill time <3 seconds bilateral. No edema observed bilateral. Varicosities absent bilateral. Skin temperature of the lower extremities is warm to warm, proximal to distal. Hair growth dim bilateral.  DERMATOLOGICAL: No skin rashes, subcutaneous nodules, lesions, or ulcers observed bilateral. Nails 1,2,3,4,5 bilateral elongated, thickened, and discolored with subungual debris. Webspaces 1,2,3,4 clean, dry and without evidence of break in skin integrity bilateral. Dry and scaly skin noted plantarly bilateral.   NEUROLOGICAL: Light touch, sharp-dull, proprioception all present and equal bilaterally.  Vibratory sensation diminished at bilateral hallux and navicular. Protective sensation absent at sites as tested with a [place] 5.07 monofilament.   MUSCULOSKELETAL: Muscle strength is 5/5 for foot inverters, everters, plantarflexors, and dorsiflexors. Muscle tone is normal.         Assessment:       Encounter Diagnoses   Name Primary?    Type II diabetes  mellitus with neurological manifestations Yes    Dermatophytosis of nail          Plan:   Type II diabetes mellitus with neurological manifestations    Dermatophytosis of nail      I counseled the patient on his conditions, regarding findings of my examination, my impressions, and usual treatment plan.   Greater than 15 minutes of a 20 minute appointment spent on education about the diabetic foot, neuropathy, and prevention of limb loss.  Shoe inspection. Diabetic Foot Education. Patient reminded of the importance of good nutrition and blood sugar control to help prevent podiatric complications of diabetes. Patient instructed on proper foot hygeine. We discussed wearing proper shoe gear, daily foot inspections, never walking without protective shoe gear, never putting sharp instruments to feet.    With patient's permission, nails 1-5 bilateral were debrided/trimmed in length and thickness aggressively to their soft tissue attachment mechanically and with electric , removing all offending nail and debris. Patient relates relief following the procedure.  Patient  will continue to monitor the areas daily, inspect feet, wear protective shoe gear when ambulatory, moisturizer to maintain skin integrity. Patient reminded of the importance of good nutrition and blood sugar control to help prevent podiatric complications of diabetes.  Patient to return 3 months or sooner if needed.              Barb Veras DPM  [facility]

## 2019-06-19 ENCOUNTER — TELEPHONE (OUTPATIENT)
Dept: NEUROSURGERY | Facility: CLINIC | Age: 64
End: 2019-06-19

## 2019-06-19 NOTE — TELEPHONE ENCOUNTER
Spoke with pt to reschedule MRI Lumbar Spine f/u appt. For next tueday at [facility] [place] [facility] with Dr. Chandra

## 2019-06-20 ENCOUNTER — PATIENT OUTREACH (OUTPATIENT)
Dept: ADMINISTRATIVE | Facility: HOSPITAL | Age: 64
End: 2019-06-20

## 2019-06-21 ENCOUNTER — TELEPHONE (OUTPATIENT)
Dept: NEUROSURGERY | Facility: CLINIC | Age: 64
End: 2019-06-21

## 2019-06-21 NOTE — TELEPHONE ENCOUNTER
Returned patient call- lefted message to return call to discuss further , if he has any available images on a disc for upcoming appt       ----- Message from Jane Castelan sent at 6/21/2019 12:31 PM CDT -----  Contact: Pt   Pt is calling .Type:  Patient Returning Call    Who Called: Pt   Who Left Message for Patient: Nurse   Does the patient know what this is regarding?:Pt states that call is concerning changing appt.    Would the patient rather a call back or a response via [facility]? Call Back   Best Call Back Number: [phone]         .Thank You  Susana Castelan

## 2019-06-21 NOTE — TELEPHONE ENCOUNTER
Called pt to confirm pt MRI appt is 6/25 and f/u appt with Olinde is 7/3. Pt did not answer I did leave a message

## 2019-06-25 ENCOUNTER — LAB VISIT (OUTPATIENT)
Dept: LAB | Facility: HOSPITAL | Age: 64
End: 2019-06-25
Attending: STUDENT IN AN ORGANIZED HEALTH CARE EDUCATION/TRAINING PROGRAM
Payer: MEDICARE

## 2019-06-25 ENCOUNTER — OFFICE VISIT (OUTPATIENT)
Dept: DERMATOLOGY | Facility: CLINIC | Age: 64
End: 2019-06-25
Payer: MEDICARE

## 2019-06-25 DIAGNOSIS — M35.9 CONNECTIVE TISSUE DISEASE: ICD-10-CM

## 2019-06-25 DIAGNOSIS — M35.9 CONNECTIVE TISSUE DISEASE: Primary | ICD-10-CM

## 2019-06-25 LAB — CK SERPL-CCNC: 160 U/L (ref 20–200)

## 2019-06-25 PROCEDURE — 36415 COLL VENOUS BLD VENIPUNCTURE: CPT | Mod: HCNC

## 2019-06-25 PROCEDURE — 99213 OFFICE O/P EST LOW 20 MIN: CPT | Mod: S$PBB,HCNC,, | Performed by: STUDENT IN AN ORGANIZED HEALTH CARE EDUCATION/TRAINING PROGRAM

## 2019-06-25 PROCEDURE — 82550 ASSAY OF CK (CPK): CPT | Mod: HCNC

## 2019-06-25 PROCEDURE — 99999 PR PBB SHADOW E&M-EST. PATIENT-LVL II: ICD-10-PCS | Mod: PBBFAC,HCNC,, | Performed by: STUDENT IN AN ORGANIZED HEALTH CARE EDUCATION/TRAINING PROGRAM

## 2019-06-25 PROCEDURE — 99213 PR OFFICE/OUTPT VISIT, EST, LEVL III, 20-29 MIN: ICD-10-PCS | Mod: S$PBB,HCNC,, | Performed by: STUDENT IN AN ORGANIZED HEALTH CARE EDUCATION/TRAINING PROGRAM

## 2019-06-25 PROCEDURE — 82085 ASSAY OF ALDOLASE: CPT | Mod: HCNC

## 2019-06-25 PROCEDURE — 99999 PR PBB SHADOW E&M-EST. PATIENT-LVL II: CPT | Mod: PBBFAC,HCNC,, | Performed by: STUDENT IN AN ORGANIZED HEALTH CARE EDUCATION/TRAINING PROGRAM

## 2019-06-25 PROCEDURE — 86038 ANTINUCLEAR ANTIBODIES: CPT | Mod: HCNC

## 2019-06-25 RX ORDER — PREDNISONE 20 MG/1
TABLET ORAL
Qty: 42 TABLET | Refills: 0 | Status: SHIPPED | OUTPATIENT
Start: 2019-06-25 | End: 2019-06-27 | Stop reason: ALTCHOICE

## 2019-06-25 RX ORDER — PREDNISONE 20 MG/1
20 TABLET ORAL DAILY
Qty: 15 TABLET | Refills: 0 | Status: CANCELLED | OUTPATIENT
Start: 2019-06-25 | End: 2019-07-10

## 2019-06-25 NOTE — PROGRESS NOTES
Subjective:       Patient ID:  Crow Green is a 64 y.o. male who presents for   Chief Complaint   Patient presents with    Dry Skin     c/o dry skin to body that itch  x 1 month tx atarax kenalog xyzal      History of Present Illness: The patient presents with chief complaint of a rash. Last seen on 5/13/19 by JEWELS Orozco where he had a biopsy done that was consistent with interface dermatitis and connective tissue disorder. Patient reports having diffuse intense itching and redness and flaking of the skin. He was started on hydroxyzine and TAC cream with no improvement in his symptoms. He also complains of having muscle weakness in the hips with difficulty getting out of chairs and off the ground. Patient reports worsening symptoms with sun exposure.     Of note, patient with diabetes, stable control over glucose levels.         Review of Systems     Objective:    Physical Exam   Constitutional: He appears well-developed and well-nourished. No distress.   Neurological: He is alert and oriented to person, place, and time. He is not disoriented.   Psychiatric: He has a normal mood and affect.   Skin:   Areas Examined (abnormalities noted in diagram):   Head / Face Inspection Performed  Neck Inspection Performed  Chest / Axilla Inspection Performed  Abdomen Inspection Performed  Back Inspection Performed  RUE Inspected  LUE Inspection Performed              Diagram Legend     Erythematous scaling macule/papule c/w actinic keratosis       Vascular papule c/w angioma      Pigmented verrucoid papule/plaque c/w seborrheic keratosis      Yellow umbilicated papule c/w sebaceous hyperplasia      Irregularly shaped tan macule c/w lentigo     1-2 mm smooth white papules consistent with Milia      Movable subcutaneous cyst with punctum c/w epidermal inclusion cyst      Subcutaneous movable cyst c/w pilar cyst      Firm pink to brown papule c/w dermatofibroma      Pedunculated fleshy papule(s) c/w skin tag(s)       Evenly pigmented macule c/w junctional nevus     Mildly variegated pigmented, slightly irregular-bordered macule c/w mildly atypical nevus      Flesh colored to evenly pigmented papule c/w intradermal nevus       Pink pearly papule/plaque c/w basal cell carcinoma      Erythematous hyperkeratotic cursted plaque c/w SCC      Surgical scar with no sign of skin cancer recurrence      Open and closed comedones      Inflammatory papules and pustules      Verrucoid papule consistent consistent with wart     Erythematous eczematous patches and plaques     Dystrophic onycholytic nail with subungual debris c/w onychomycosis     Umbilicated papule    Erythematous-base heme-crusted tan verrucoid plaque consistent with inflamed seborrheic keratosis     Erythematous Silvery Scaling Plaque c/w Psoriasis     See annotation      Assessment / Plan:        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 Pt with extensive skin involvement with worsening symptoms; will treat with oral prednisone taper - discussed to closely monitor glucose levels. Will obtain further lab workup, including AMOL and reflex profile.     -     predniSONE (DELTASONE) 20 MG tablet; Take 3 pills po qam with breakfast x 1 wk then take 2 po qam with breakfast x 1 wk then take 1 po qam with breakfast x 1 wk  Dispense: 42 tablet; Refill: 0  -     AMOL Screen w/Reflex; Future; Expected date: 06/25/2019  -     CK; Future; Expected date: 06/25/2019  -     ALDOLASE; Future; Expected date: 06/25/2019  -     Ambulatory Referral to Rheumatology - will ask for assistance in management with immunomodulators. Plaquenil vs Cellcept vs Imuran for steroid sparing treatment if needed   -      Pt reports being up to date on age appropriate cancer screenings. Besides muscle weakness, no other concerning systemic side effects.          Follow up in about 6 weeks (around 8/6/2019).

## 2019-06-26 ENCOUNTER — HOSPITAL ENCOUNTER (OUTPATIENT)
Dept: RADIOLOGY | Facility: HOSPITAL | Age: 64
Discharge: HOME | End: 2019-06-26
Attending: NEUROLOGICAL SURGERY
Payer: MEDICARE

## 2019-06-26 DIAGNOSIS — M47.816 LUMBAR SPONDYLOSIS: ICD-10-CM

## 2019-06-26 LAB
ALDOLASE SERPL-CCNC: 4.1 U/L (ref 1.2–7.6)
ANA SER QL IF: NORMAL

## 2019-06-26 PROCEDURE — 72148 MRI LUMBAR SPINE W/O DYE: CPT | Mod: TC,HCNC

## 2019-06-26 PROCEDURE — 72148 MRI LUMBAR SPINE W/O DYE: CPT | Mod: 26,HCNC,, | Performed by: RADIOLOGY

## 2019-06-26 PROCEDURE — 72148 MRI LUMBAR SPINE WITHOUT CONTRAST: ICD-10-PCS | Mod: 26,HCNC,, | Performed by: RADIOLOGY

## 2019-06-27 ENCOUNTER — TELEPHONE (OUTPATIENT)
Dept: RHEUMATOLOGY | Facility: CLINIC | Age: 64
End: 2019-06-27

## 2019-06-27 ENCOUNTER — LAB VISIT (OUTPATIENT)
Dept: LAB | Facility: HOSPITAL | Age: 64
End: 2019-06-27
Attending: FAMILY MEDICINE
Payer: MEDICARE

## 2019-06-27 ENCOUNTER — OFFICE VISIT (OUTPATIENT)
Dept: INTERNAL MEDICINE | Facility: CLINIC | Age: 64
End: 2019-06-27
Payer: MEDICARE

## 2019-06-27 ENCOUNTER — INITIAL CONSULT (OUTPATIENT)
Dept: RHEUMATOLOGY | Facility: CLINIC | Age: 64
End: 2019-06-27
Payer: MEDICARE

## 2019-06-27 VITALS
HEART RATE: 94 BPM | DIASTOLIC BLOOD PRESSURE: 60 MMHG | SYSTOLIC BLOOD PRESSURE: 139 MMHG | RESPIRATION RATE: 12 BRPM | WEIGHT: 198.63 LBS | OXYGEN SATURATION: 97 % | BODY MASS INDEX: 33.09 KG/M2 | TEMPERATURE: 96 F | HEIGHT: 65 IN

## 2019-06-27 VITALS
BODY MASS INDEX: 33.27 KG/M2 | SYSTOLIC BLOOD PRESSURE: 148 MMHG | HEART RATE: 58 BPM | WEIGHT: 199.94 LBS | DIASTOLIC BLOOD PRESSURE: 74 MMHG

## 2019-06-27 DIAGNOSIS — R76.8 OTHER SPECIFIED ABNORMAL IMMUNOLOGICAL FINDINGS IN SERUM: ICD-10-CM

## 2019-06-27 DIAGNOSIS — Z79.4 TYPE 2 DIABETES MELLITUS WITH MILD NONPROLIFERATIVE RETINOPATHY, WITH LONG-TERM CURRENT USE OF INSULIN, MACULAR EDEMA PRESENCE UNSPECIFIED, UNSPECIFIED LATERALITY: Primary | ICD-10-CM

## 2019-06-27 DIAGNOSIS — E11.3299 TYPE 2 DIABETES MELLITUS WITH MILD NONPROLIFERATIVE RETINOPATHY, WITH LONG-TERM CURRENT USE OF INSULIN, MACULAR EDEMA PRESENCE UNSPECIFIED, UNSPECIFIED LATERALITY: Primary | ICD-10-CM

## 2019-06-27 DIAGNOSIS — Z12.5 PROSTATE CANCER SCREENING: ICD-10-CM

## 2019-06-27 DIAGNOSIS — I50.22 CHRONIC SYSTOLIC CONGESTIVE HEART FAILURE: ICD-10-CM

## 2019-06-27 DIAGNOSIS — M33.13 DERMATOMYOSITIS: Primary | ICD-10-CM

## 2019-06-27 DIAGNOSIS — M51.26 HNP (HERNIATED NUCLEUS PULPOSUS), LUMBAR: ICD-10-CM

## 2019-06-27 DIAGNOSIS — I20.89 ANGINA AT REST: ICD-10-CM

## 2019-06-27 DIAGNOSIS — R53.83 FATIGUE, UNSPECIFIED TYPE: ICD-10-CM

## 2019-06-27 LAB
ESTIMATED AVG GLUCOSE: 235 MG/DL (ref 68–131)
HBA1C MFR BLD HPLC: 9.8 % (ref 4–5.6)

## 2019-06-27 PROCEDURE — A6449: ICD-10-PCS | Mod: HCNC,S$GLB,, | Performed by: FAMILY MEDICINE

## 2019-06-27 PROCEDURE — 99214 PR OFFICE/OUTPT VISIT, EST, LEVL IV, 30-39 MIN: ICD-10-PCS | Mod: HCNC,S$GLB,, | Performed by: FAMILY MEDICINE

## 2019-06-27 PROCEDURE — 3074F SYST BP LT 130 MM HG: CPT | Mod: HCNC,CPTII,S$GLB, | Performed by: INTERNAL MEDICINE

## 2019-06-27 PROCEDURE — 3074F PR MOST RECENT SYSTOLIC BLOOD PRESSURE < 130 MM HG: ICD-10-PCS | Mod: HCNC,CPTII,S$GLB, | Performed by: INTERNAL MEDICINE

## 2019-06-27 PROCEDURE — 99215 PR OFFICE/OUTPT VISIT, EST, LEVL V, 40-54 MIN: ICD-10-PCS | Mod: HCNC,25,S$GLB, | Performed by: INTERNAL MEDICINE

## 2019-06-27 PROCEDURE — 36415 COLL VENOUS BLD VENIPUNCTURE: CPT | Mod: HCNC,PO

## 2019-06-27 PROCEDURE — 3078F PR MOST RECENT DIASTOLIC BLOOD PRESSURE < 80 MM HG: ICD-10-PCS | Mod: HCNC,CPTII,S$GLB, | Performed by: INTERNAL MEDICINE

## 2019-06-27 PROCEDURE — 3046F PR MOST RECENT HEMOGLOBIN A1C LEVEL > 9.0%: ICD-10-PCS | Mod: HCNC,CPTII,S$GLB, | Performed by: FAMILY MEDICINE

## 2019-06-27 PROCEDURE — 3078F DIAST BP <80 MM HG: CPT | Mod: HCNC,CPTII,S$GLB, | Performed by: INTERNAL MEDICINE

## 2019-06-27 PROCEDURE — 99999 PR PBB SHADOW E&M-EST. PATIENT-LVL III: ICD-10-PCS | Mod: PBBFAC,HCNC,, | Performed by: INTERNAL MEDICINE

## 2019-06-27 PROCEDURE — 96372 THER/PROPH/DIAG INJ SC/IM: CPT | Mod: HCNC,S$GLB,, | Performed by: INTERNAL MEDICINE

## 2019-06-27 PROCEDURE — 3008F BODY MASS INDEX DOCD: CPT | Mod: HCNC,CPTII,S$GLB, | Performed by: INTERNAL MEDICINE

## 2019-06-27 PROCEDURE — 99214 OFFICE O/P EST MOD 30 MIN: CPT | Mod: HCNC,S$GLB,, | Performed by: FAMILY MEDICINE

## 2019-06-27 PROCEDURE — 3075F PR MOST RECENT SYSTOLIC BLOOD PRESS GE 130-139MM HG: ICD-10-PCS | Mod: HCNC,CPTII,S$GLB, | Performed by: FAMILY MEDICINE

## 2019-06-27 PROCEDURE — 99999 PR PBB SHADOW E&M-EST. PATIENT-LVL IV: CPT | Mod: PBBFAC,HCNC,, | Performed by: FAMILY MEDICINE

## 2019-06-27 PROCEDURE — 99999 PR PBB SHADOW E&M-EST. PATIENT-LVL IV: ICD-10-PCS | Mod: PBBFAC,HCNC,, | Performed by: FAMILY MEDICINE

## 2019-06-27 PROCEDURE — 99215 OFFICE O/P EST HI 40 MIN: CPT | Mod: HCNC,25,S$GLB, | Performed by: INTERNAL MEDICINE

## 2019-06-27 PROCEDURE — 3078F DIAST BP <80 MM HG: CPT | Mod: HCNC,CPTII,S$GLB, | Performed by: FAMILY MEDICINE

## 2019-06-27 PROCEDURE — 99999 PR PBB SHADOW E&M-EST. PATIENT-LVL III: CPT | Mod: PBBFAC,HCNC,, | Performed by: INTERNAL MEDICINE

## 2019-06-27 PROCEDURE — 3078F PR MOST RECENT DIASTOLIC BLOOD PRESSURE < 80 MM HG: ICD-10-PCS | Mod: HCNC,CPTII,S$GLB, | Performed by: FAMILY MEDICINE

## 2019-06-27 PROCEDURE — 3075F SYST BP GE 130 - 139MM HG: CPT | Mod: HCNC,CPTII,S$GLB, | Performed by: FAMILY MEDICINE

## 2019-06-27 PROCEDURE — 3008F PR BODY MASS INDEX (BMI) DOCUMENTED: ICD-10-PCS | Mod: HCNC,CPTII,S$GLB, | Performed by: INTERNAL MEDICINE

## 2019-06-27 PROCEDURE — 3008F PR BODY MASS INDEX (BMI) DOCUMENTED: ICD-10-PCS | Mod: HCNC,CPTII,S$GLB, | Performed by: FAMILY MEDICINE

## 2019-06-27 PROCEDURE — A6449 LT COMPRES BAND >=3" <5"/YD: HCPCS | Mod: HCNC,S$GLB,, | Performed by: FAMILY MEDICINE

## 2019-06-27 PROCEDURE — 3008F BODY MASS INDEX DOCD: CPT | Mod: HCNC,CPTII,S$GLB, | Performed by: FAMILY MEDICINE

## 2019-06-27 PROCEDURE — 83036 HEMOGLOBIN GLYCOSYLATED A1C: CPT | Mod: HCNC

## 2019-06-27 PROCEDURE — 3046F HEMOGLOBIN A1C LEVEL >9.0%: CPT | Mod: HCNC,CPTII,S$GLB, | Performed by: FAMILY MEDICINE

## 2019-06-27 PROCEDURE — 96372 PR INJECTION,THERAP/PROPH/DIAG2ST, IM OR SUBCUT: ICD-10-PCS | Mod: HCNC,S$GLB,, | Performed by: INTERNAL MEDICINE

## 2019-06-27 RX ORDER — BETAMETHASONE SODIUM PHOSPHATE AND BETAMETHASONE ACETATE 3; 3 MG/ML; MG/ML
6 INJECTION, SUSPENSION INTRA-ARTICULAR; INTRALESIONAL; INTRAMUSCULAR; SOFT TISSUE
Status: COMPLETED | OUTPATIENT
Start: 2019-06-27 | End: 2019-06-27

## 2019-06-27 RX ORDER — INSULIN GLARGINE 100 [IU]/ML
80 INJECTION, SOLUTION SUBCUTANEOUS NIGHTLY
Qty: 72 ML | Refills: 0 | Status: SHIPPED | OUTPATIENT
Start: 2019-06-27 | End: 2020-01-16

## 2019-06-27 RX ORDER — LISINOPRIL 10 MG/1
10 TABLET ORAL DAILY
Qty: 90 TABLET | Refills: 3 | Status: SHIPPED | OUTPATIENT
Start: 2019-06-27 | End: 2020-07-28

## 2019-06-27 RX ORDER — PREDNISONE 20 MG/1
TABLET ORAL
Qty: 100 TABLET | Refills: 0 | Status: SHIPPED | OUTPATIENT
Start: 2019-06-27 | End: 2019-06-27 | Stop reason: DRUGHIGH

## 2019-06-27 RX ORDER — NITROGLYCERIN 0.3 MG/1
TABLET SUBLINGUAL
Qty: 100 TABLET | Refills: 0 | Status: SHIPPED | OUTPATIENT
Start: 2019-06-27 | End: 2020-03-23 | Stop reason: SDUPTHER

## 2019-06-27 RX ORDER — PREDNISONE 20 MG/1
40 TABLET ORAL DAILY
Qty: 100 TABLET | Refills: 0 | Status: SHIPPED | OUTPATIENT
Start: 2019-06-27 | End: 2019-07-24 | Stop reason: SDUPTHER

## 2019-06-27 RX ADMIN — BETAMETHASONE SODIUM PHOSPHATE AND BETAMETHASONE ACETATE 6 MG: 3; 3 INJECTION, SUSPENSION INTRA-ARTICULAR; INTRALESIONAL; INTRAMUSCULAR; SOFT TISSUE at 02:06

## 2019-06-27 NOTE — PROGRESS NOTES
Administered 1cc of Betamethasone 6mg/cc to Left Ventrogluteal.  Patient tolerated well. No acute reaction noted to site.  Patient instructed on S/S to report. Patient verbalized understanding.    Lot:[id number]  Exp:04/18/20

## 2019-06-27 NOTE — ASSESSMENT & PLAN NOTE
He does continue to have back pain.  Lumbar disc herniation.  Advised follow-up with Neurosurgery.

## 2019-06-27 NOTE — PROGRESS NOTES
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, with long-term current use of insulin    SANDRITA on CPAP    Asthma    Psychophysiological insomnia    Nightmares    Sleep related gastroesophageal reflux disease    Inadequate sleep hygiene    PAF (paroxysmal atrial fibrillation)    At risk for medication noncompliance    Obesity (BMI 30.0-34.9)    Indeterminate stage chronic open angle glaucoma    Elevated troponin    Right hip pain    Gait instability    Chronic right shoulder pain    Arthritis    Left sided sciatica    Dyspnea on exertion    Osteoarthritis of spine with radiculopathy, lumbar region    HNP (herniated nucleus pulposus), lumbar    Gastroesophageal reflux disease without esophagitis    Acute on chronic combined systolic and diastolic heart failure    Hypogonadism in male      presenting for further evaluation of diffuse rash referred by Dermatology  First time  Seeing me    he was previously seen in our clinic by Dr. Jimenez  For suspected polymyalgia rheumatica/ GCA ,  But then biopsy was negative and he was tapered off steroids   in May he presented to Dermatology Clinic with a diffuse erythematous itchy rash involving  Most of the body   it involved upper arms,  Legs-  Below-knee,  Chest wall and abdominal wall,  Sparing of back   face, scalp around the eyes    it is very itchy and scaly   biopsy was done by  Dermatology,  This was suggestive of interface dermatitis   most consistent with dermatomyositis   other differential MCTD versus SLE     he denies any muscle weakness,  Normal CK and aldolase   denies any fever chills, weight loss     he was ordered prednisone 60 mg a day by dermatology but he has not started yet as he did not receive the medication from mail-order pharmacy     no family history of autoimmune disease   nonsmoker   denies any chest pain or shortness of breath    Past Medical History:   Diagnosis Date    Acute kidney injury     Arthritis     Asthma     Atrial fibrillation     CHF (congestive heart failure)     Depression     Diabetes mellitus, type 2 Diagnosed in 2000    Elevated PSA     Hypertension     Sleep apnea        Past Surgical History:   Procedure Laterality Date    BIOPSY-ARTERY-TEMPORAL Left 9/1/2017    Performed [facility] at [facility]    CHOLECYSTECTOMY      CHOLECYSTECTOMY-LAPAROSCOPIC N/A 3/17/2017    Performed by Louis O. Jeansonne IV, MD at [facility] OR         Social History     Tobacco Use    Smoking status: Never Smoker    Smokeless tobacco: Never Used   Substance Use Topics    Alcohol use: No    Drug use: No       Family History   Problem Relation Age of Onset    Glaucoma Mother     Cataracts Mother     Cataracts Father     Glaucoma Sister     Cataracts Sister     Glaucoma Maternal Aunt     No Known Problems Brother     No Known Problems Daughter     No Known Problems Son     Prostate cancer Neg Hx        Review of patient's allergies indicates:  No Known Allergies      Statin use:  Calcium supplements :  Vitamin d supplements :    DEXA :  Medications :  Exercise :    Review of Systems:  Constitutional: Denies fever, chills. No recent weight changes.   Fatigue: yes   Muscle weakness: no  Headaches: no new headaches  Eyes: No redness or dryness.  No recent visual changes.  ENT: Denies dry mouth. No oral or nasal ulcers.  Card: No chest pain.  Resp: No  cough or sob.   Gastro: No nausea or vomiting.  No heartburn.  Constipation: no  Diarrhea: no  Genito:  No dysuria.  No genital ulcers.  Skin: + rash  Raynauds:no  Neuro:  none  Psych: No depression, anxiety  Endo:  no excess thirst.  Heme: no abnormal bleeding or bruising  Clots:none   Miscarriages : none     OBJECTIVE:     Vital Signs   Vitals:    06/27/19 1305   BP: (!) 148/74   Pulse: (!) 58     Physical Exam:  General Appearance:  NAD.   Gait: not favoring.  HEENT: PERRL.  Eyes not dry or injected.  No nasal ulcers.  Mouth not dry, no oral lesions.  Lymph: cervical, supraclavicular or axillary nodes: none abnormal   Cardio: no irregularity of S1 or S2.  No gallops or rubs.   Resp: Normal respiratory motion. Clear to auscultation bilaterally.   No abnormal chest conformation.  Abd: Soft, non-tender, nondistended.  No masses.   Skin: Head and neck,  and extremities examined. + diffuse scaly erythematous rash   all over forearms, lower legs, anterior chest, anterior abdominal wall   all over face and scalp, around eyes  Neuro: Ox3.   Cranial nerves II-XII grossly intact.   Sensation intact  in both distal LE and upper extremities to light touch.  Musculoskeletal Exam:    No synovitis     L spine facet tenderness       Muscle strength:Equal and full in all mm groups of the upper and lower ext.    Laboratory:   Results for orders placed or performed in visit on 06/27/19   Hemoglobin A1c   Result Value Ref Range    Hemoglobin A1C 9.8 (H) 4.0 - 5.6 %    Estimated Avg Glucose 235 (H) 68 - 131 mg/dL     Imaging :    Notes reviewed  Other procedures:  Pathology skin reviewed    ASSESSMENT/PLAN:     Dermatomyositis  -     Myositis AssessR Plus Josie-1; Future; Expected date: 06/27/2019  -     MyoMarker Panel 3; Future; Expected date: 06/27/2019  -     C-reactive protein; Standing  -     Sedimentation rate; Standing  -     Lactate dehydrogenase; Standing  -     betamethasone acetate-betamethasone sodium phosphate injection 6  mg  -     Quantiferon Gold TB; Future; Expected date: 06/27/2019  -     Protein electrophoresis, serum; Future; Expected date: 06/27/2019  -     Immunofixation electrophoresis; Future; Expected date: 06/27/2019  -     CT Chest Abdoment Pelvis Without Contrast (XPD); Future; Expected date: 06/27/2019  -     PSA, SCREENING; Future; Expected date: 06/27/2019  -     Thiopurine Methyltrans, RBC; Future; Expected date: 06/27/2019    Fatigue, unspecified type  -     Hepatitis panel, acute; Future    Other specified abnormal immunological findings in serum  -     CT Chest Abdoment Pelvis Without Contrast (XPD); Future; Expected date: 06/27/2019    Prostate cancer screening  -     PSA, SCREENING; Future; Expected date: 06/27/2019    Other orders  -     predniSONE (DELTASONE) 20 MG tablet; 60 mg  day x 1 week , then 50 mg/ day x week , then stay on 40 mg / day until seen  Dispense: 100 tablet; Refill: 0    1: dermatomyositis sine myositis    no evidence of muscular involvement,  Normal CK and aldolase   start prednisone 40 mg a day- higher dose avoided due to multiple comorbid conditions including atrial fibrillation and IDDM   will watch his blood sugars closely   if worsening or no response noted in the next 72 hr on 40 mg of prednisone he would call me back     check TPMT ,  Will add Imuran next visit     other labs and Sumanth markers as above   malignancy screening   PSA   CT chest abdomen and pelvis   colonoscopy pending    7-10  Day return    Risks vs Benefits and potential side effects of medication prescribed today were discussed with patient. Medication literature given to patient up discharge  Went over uptodate information /literature on the meds prescribed today       steroids-  Weight gain, uncontrolled blood sugars, cataracts,  palpitations discussed   please notify me if any major side effects noted on steroids  Patient advised to hold DMARD and/or biologic therapy for signs of infection or for surgery. If you are  unsure what to do please call our office for instruction. [facility] [phone]        Disclaimer: This note was prepared using voice recognition system and is likely to have sound alike errors and is not proof read.  Please call me with any questions.

## 2019-06-27 NOTE — LETTER
June 27, 2019      Sea Agee MD  [address] [place] [address]           [facility]  [address] [place] [address]  Phone: [phone]  Fax: [phone]          Patient: Crow Green   MR Number: [id number]   YOB: 1955   Date of Visit: 6/27/2019       Dear Dr. Sea Agee:    Thank you for referring Crow Green to me for evaluation. Attached you will find relevant portions of my assessment and plan of care.    If you have questions, please do not hesitate to call me. I look forward to following Crow Green along with you.    Sincerely,    Della Lucero MD    Enclosure  CC:  No Recipients    If you would like to receive this communication electronically, please contact [email or web address] or [phone] to request more information on [facility] Link access.    For providers and/or their staff who would like to refer a patient to [facility], please contact us through our one-stop-shop provider referral line, [facility], at [phone].    If you feel you have received this communication in error or would no longer like to receive these types of communications, please e-mail [email or web address]

## 2019-06-27 NOTE — TELEPHONE ENCOUNTER
Called micah to clarify Prednisone RX as requested by Dr. Lucero. Spoke to Alva verbalized understanding for 20mg Prednisone 2/day

## 2019-06-27 NOTE — PROGRESS NOTES
Subjective:       Patient ID: Crow Green is a 64 y.o. male.    Chief Complaint: Follow-up (3 months); Diabetes; and Hypertension    HPI    Came in today for follow-up on diabetes.  He did not bring glucose log today however says that his lowest numbers in a fasting state have been around 160.  He continues to take 70 units of Lantus as well as NovoLog during the day with meals.  Denies any problems with compliance with his medications.  Has pretty significant problem going on which is a rash that has caused a lot of discomfort that has all over his body.  He has seen Dermatology already and has an appointment later today with Rheumatology.  Was recommended to start on a prednisone taper but is awaiting for that medication to come in to started.  It is causing him a lot of itching and discomfort and giving him a hard time sleeping at night.    Has recently followed up with neuro surgery and had an MRI done yesterday which I reviewed.  He does continue to have back pain.  Lumbar disc herniation.  Advised follow-up with Neurosurgery.  No red flag symptoms at this time.    Family History   Problem Relation Age of Onset    Glaucoma Mother     Cataracts Mother     Cataracts Father     Glaucoma Sister     Cataracts Sister     Glaucoma Maternal Aunt     No Known Problems Brother     No Known Problems Daughter     No Known Problems Son     Prostate cancer Neg Hx        Current Outpatient Medications:     ALCOHOL ANTISEPTIC PADS (ALCOHOL PREP PADS TOP), , Disp: , Rfl:     amLODIPine (NORVASC) 10 MG tablet, TAKE 1 TABLET ONE TIME DAILY, Disp: 90 tablet, Rfl: 3    atorvastatin (LIPITOR) 40 MG tablet, Take 1 tablet (40 mg total) by mouth every evening., Disp: 90 tablet, Rfl: 3    baclofen (LIORESAL) 10 MG tablet, Take 1 tablet (10 mg total) by mouth once daily., Disp: 90 tablet, Rfl: 3    blood-glucose meter (TRUE METRIX AIR GLUCOSE METER) kit, TEST FOUR TIMES DAILY BEFORE MEALS  AND EVERY NIGHT, Disp: 1  "each, Rfl: 0    ELIQUIS 5 mg Tab, , Disp: , Rfl:     furosemide (LASIX) 40 MG tablet, Take 2 tablets (80 mg total) by mouth every morning AND 1 tablet (40 mg total) every evening., Disp: 90 tablet, Rfl: 11    gabapentin (NEURONTIN) 800 MG tablet, Take 1 tablet (800 mg total) by mouth 3 (three) times daily., Disp: 270 tablet, Rfl: 0    glimepiride (AMARYL) 2 MG tablet, Take 1 tablet (2 mg total) by mouth before breakfast., Disp: 90 tablet, Rfl: 3    insulin aspart U-100 (NOVOLOG FLEXPEN U-100 INSULIN) 100 unit/mL (3 mL) InPn pen, INJECT 20 UNITS SUBCUTANEOUSLY THREE TIMES DAILY WITH MEALS, Disp: 15 mL, Rfl: 0    insulin syringe-needle U-100 1 mL 31 gauge x 5/16 Syrg, , Disp: , Rfl:     ipratropium-albuterol (COMBIVENT)  mcg/actuation inhaler, Inhale 1 puff into the lungs 4 (four) times daily. Rescue, Disp: 4 g, Rfl: 0    isosorbide mononitrate (IMDUR) 60 MG 24 hr tablet, Take 1 tablet (60 mg total) by mouth once daily., Disp: 90 tablet, Rfl: 3    latanoprost 0.005 % ophthalmic solution, Place 1 drop into both eyes every evening., Disp: 1 Bottle, Rfl: 4    lisinopril 10 MG tablet, Take 1 tablet (10 mg total) by mouth once daily., Disp: 90 tablet, Rfl: 3    metFORMIN (GLUCOPHAGE) 1000 MG tablet, Take 1 tablet (1,000 mg total) by mouth 2 (two) times daily with meals., Disp: 180 tablet, Rfl: 3    metOLazone (ZAROXOLYN) 2.5 MG tablet, Take 1 tablet (2.5 mg total) by mouth once daily., Disp: 90 tablet, Rfl: 3    metoprolol succinate (TOPROL-XL) 50 MG 24 hr tablet, TAKE 1 TABLET EVERY DAY, Disp: 90 tablet, Rfl: 5    nitroGLYCERIN (NITROSTAT) 0.3 MG SL tablet, PLACE 1 TABLET UNDER THE TONGUE EVERY 5 MINUTES AS NEEDED FOR CHEST PAIN, NO MORE THAN 3 TABLETS IN ONE DAY, Disp: 100 tablet, Rfl: 0    pantoprazole (PROTONIX) 40 MG tablet, Take 1 tablet (40 mg total) by mouth once daily., Disp: 30 tablet, Rfl: 11    pen needle, diabetic (BD INSULIN PEN NEEDLE UF SHORT) 31 gauge x 5/16" Ndle, Inject 1 each into " "the skin 3 (three) times daily., Disp: 90 each, Rfl: 11    sertraline (ZOLOFT) 50 MG tablet, TAKE 1 TABLET ONE TIME DAILY, Disp: 90 tablet, Rfl: 3    traZODone (DESYREL) 150 MG tablet, TAKE 1 TABLET EVERY NIGHT, Disp: 90 tablet, Rfl: 1    VENTOLIN HFA 90 mcg/actuation inhaler, Inhale 2 puffs into the lungs every 4 (four) hours as needed for Wheezing., Disp: 3 Inhaler, Rfl: 4    aspirin (ECOTRIN) 81 MG EC tablet, Take 1 tablet (81 mg total) by mouth once daily., Disp: , Rfl: 0    fluticasone-salmeterol 250-50 mcg/dose (ADVAIR DISKUS) 250-50 mcg/dose diskus inhaler, Inhale 1 puff into the lungs 2 (two) times daily. Controller, Disp: 60 each, Rfl: 11    inhalation spacing device, Use as directed for inhalation., Disp: 1 Device, Rfl: 0    insulin (LANTUS SOLOSTAR U-100 INSULIN) glargine 100 units/mL (3mL) SubQ pen, Inject 80 Units into the skin every evening., Disp: 72 mL, Rfl: 0    tiotropium (SPIRIVA WITH HANDIHALER) 18 mcg inhalation capsule, Inhale 1 capsule (18 mcg total) into the lungs once daily. Controller, Disp: 90 capsule, Rfl: 3    Review of Systems   Constitutional: Negative for chills and fever.   Eyes: Negative for visual disturbance.   Respiratory: Negative for cough and shortness of breath.    Cardiovascular: Negative for chest pain.   Gastrointestinal: Negative for abdominal pain.   Musculoskeletal: Positive for back pain.   Skin: Positive for rash.   Neurological: Negative for dizziness.       Objective:   /60 (BP Location: Left arm, Patient Position: Sitting, BP Method: Medium (Automatic))   Pulse 94   Temp 96.3 °F (35.7 °C) (Tympanic)   Resp 12   Ht 5' 5" (1.651 m)   Wt 90.1 kg (198 lb 10.2 oz)   SpO2 97%   BMI 33.05 kg/m²      Physical Exam   Constitutional: He is oriented to person, place, and time. He appears well-developed and well-nourished. No distress.   HENT:   Head: Normocephalic and atraumatic.   Nose: Nose normal.   Eyes: Pupils are equal, round, and reactive to light. " Conjunctivae and EOM are normal. Right eye exhibits no discharge. Left eye exhibits no discharge.   Neck: No thyromegaly present.   Cardiovascular: Normal rate, regular rhythm and normal heart sounds.   No murmur heard.  Pulmonary/Chest: Effort normal and breath sounds normal. No respiratory distress. He has no wheezes.   Abdominal: Soft. He exhibits no distension.   Musculoskeletal: He exhibits no edema.   Neurological: He is alert and oriented to person, place, and time.   Skin: Skin is warm. Rash (  Diffuse rash on full body which is causing erythema and cracking.  He says that his arms are bothering him the most.  Prior to him leaving he wanted to wet a paper towel and use an Ace wrap it to give him some relief and prevent him from scratching) noted. He is not diaphoretic.   Psychiatric: He has a normal mood and affect. His behavior is normal.   Vitals reviewed.      Assessment & Plan     Problem List Items Addressed This Visit        Neuro    HNP (herniated nucleus pulposus), lumbar    Current Assessment & Plan      He does continue to have back pain.  Lumbar disc herniation.  Advised follow-up with Neurosurgery.            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  When he starts on the prednisone for rash he should increase the mealtime insulin by 5 units.         Relevant Medications    insulin (LANTUS SOLOSTAR U-100 INSULIN) glargine 100 units/mL (3mL) SubQ pen       Cardiac/Vascular    Chronic systolic congestive heart failure    Current Assessment & Plan       Has been doing well.  No signs or symptoms of decompensation at this time.           Other Visit Diagnoses     Type 2 diabetes mellitus with mild nonproliferative retinopathy, with long-term current use of insulin, macular edema presence unspecified,  unspecified laterality    -  Primary    Relevant Medications    insulin (LANTUS SOLOSTAR U-100 INSULIN) glargine 100 units/mL (3mL) SubQ pen    Other Relevant Orders    Ambulatory Referral to Outpatient Case Management    Angina at rest        Relevant Medications    nitroGLYCERIN (NITROSTAT) 0.3 MG SL tablet            No follow-ups on file.    Disclaimer:  This note may have been prepared using voice recognition software, it may have not been extensively proofed, as such there could be errors within the text such as sound alike errors.

## 2019-07-01 ENCOUNTER — OUTPATIENT CASE MANAGEMENT (OUTPATIENT)
Dept: ADMINISTRATIVE | Facility: OTHER | Age: 64
End: 2019-07-01

## 2019-07-01 ENCOUNTER — TELEPHONE (OUTPATIENT)
Dept: RADIOLOGY | Facility: HOSPITAL | Age: 64
End: 2019-07-01

## 2019-07-01 NOTE — LETTER
July 1, 2019    Crow FISCHER Norma  [address]  [facility] [address]             [facility] [address] [place] [address] Dear Mr Green,     I work with [facility] Outpatient Case Management Department. We received a referral to call you to discuss your medical history.These services are free of charge and are offered to [facility] patients who have recently been discharged from any of our facilities or who have complex medical conditions that may require the skill of a nurse to assist with management.             I am a Registered Nurse who specializes in connecting patients with available medical and financial resources as well as addressing any educational needs that may be indicated.      I attempted to reach you by telephone, but I was unsuccessful. Please call our department so that we can go over some questions with you regarding your health.    The Outpatient Case Management Department can be reached at [phone] from 8:00AM to 4:30 PM on Monday thru Friday. [facility] also has a program where a nurse is available 24/7 to answer questions or provide medical advice. Their number is [phone].      Thank you,      Dang Rausch RN  Outpatient Case Management  [phone]        MD aware of BNP and kidney function

## 2019-07-01 NOTE — LETTER
July 2, 2019    Crow AALIYAH Green  [address]  [facility] [address]             [facility] [address] [place] [address] Welcome to [facility]s Complex Care Management Program.  It was a pleasure talking with you today.  My name is Dang Clay and I look forward to being your Care Manager.  My goal is to help you function at the healthiest and highest level possible.  You can contact me directly at [phone].    As an [facility] patient with [employer], some of the services we may be able to provide include:      Development of an individualized care plan with a Registered Nurse    Connection with a    Connection with available resources and services     Coordinate communication among your care team members    Provide coaching and education    Help you understand your doctors treatment plan   Help you obtain information about your insurance coverage.     All services provided by [facility]s Complex Care Managers and other care team members are coordinated with and communicated to your primary care team.    As part of your enrollment, you will be receiving education materials and more information about these services in your [facility] account, by phone or through the mail.  If you do not wish to participate or receive information, please contact our office at [phone].      Sincerely,        Dang Clay RN, CCM [facility]   Out-patient RN Complex Care Manager

## 2019-07-02 ENCOUNTER — TELEPHONE (OUTPATIENT)
Dept: INTERNAL MEDICINE | Facility: CLINIC | Age: 64
End: 2019-07-02

## 2019-07-02 NOTE — PROGRESS NOTES
7/2/19 - Summary: Phone contact made with pt today for completion of Initial Screen for OPCM. He is a 63 y/o male with diagnoses of uncontrolled DM with polyneuropathy with A1c of 9.8,  on 6/27/19, diabetic retinopathy, OA of lumbar spine, HTN, CHF, CAD with hx of MI and asthma. He lives with his father, who is disabled and pt tries to provide care for, but he reports difficulty with assisting his father as he has a hard time taking care of himself recently. He uses a rollator with all mobility. He denies any falls within the last 12 months. He is able to perform most of his own personal care needs, but is unable to drive, shop, cook or clean house. His daughter assists him with transportation and some other IADL's, like shopping and home maintenance, but she lives in [place], so she does not visit often. He wants to get her to start setting up his meds for him as well. He readily admits to noncompliance with some meds, stating he has a hard time swallowing big pills(Metformin) and taking so many pills at one time in the morning causes nausea. He does report compliance with heart meds, Amaryl and insulins, but does not take Metformin. He performs finger sticks 6 X daily, before and after all meals. He reports his blood sugar has been around 140-150 since he saw Dr Lambert last weeks, even tho he is not taking Metformin. He does not have an established disaster plan nor an advanced directive. He is accessing [employer] transportation and OTC benefits, but is not accessing the Chronic Condition Meals Program that he qualifies for. He scores 16 on the PHQ9 and reports thoughts of being better off dead, but no SI.     Interventions: Completed Initial Screen, Nursing Assessment, PHQ 9, POC and Medication Reconciliation. Discussed DM, CAD and CHF and importance of diet compliance with all recommended restrictions to help manage these disease processes. Discussed [employer] Chronic Condition Meals Program and he agreed to this CM  sending Dr Lambert a request to complete an application and send it to [employer]. Advised this CM will:  - Mail educational literature about DM, CHF and CAD. Mail out will include an advanced directive packet and an emergency preparedness brochure.   - Refer him to Amanda Whipple [facility] PRIETOW related to score of 16 on PHQ 9.   - Request application to be completed and sent to [facility] for Chronic Conditions Meal Program by Dr Lambert and his staff.   - Follow up in two weeks on 7/16/19 between 10-11 AM as he reports any day is fine as long as he does not have a scheduled appt and 10-11 AM is the best time to reach him.     Plan: Follow up on 7/16/19 as above. Has he rec'd mailed literature and reveiwed it? How are blood sugars running? Has he modified his diet any? Has he started receiving meals from [employer]?    Todays [facility] Self-Management Care Plan was developed with the patients/caregivers input and was based on identified barriers from todays assessment.  Goals were written today with the patient/caregiver and the patient has agreed to work towards these goals to improve his/her overall well-being. Patient verbalized understanding of the care plan, goals, and all of today's instructions. Encouraged patient/caregiver to communicate with his/her physician and health care team about health conditions and the treatment plan.  Provided my contact information today and encouraged patient/caregiver to call me with any questions as needed. Dang Clay RN

## 2019-07-02 NOTE — TELEPHONE ENCOUNTER
----- Message from Dang Clay RN sent at 7/2/2019  9:30 AM CDT -----  Contact: Dang Lambert/Staff,    I screened Mr Norma for OPCM today and he admitted he is not taking Metformin related to difficulty with swallowing large pills. He does take Amaryl, Lantus and Novolog, but sounds like he adjusts these doses as well related to finger stick results. I will send him some educational literature about DM and strongly encouraged him to take his meds as ordered and follow a low carb diet.     He reports significant financial difficulties in affording food, stating he eats a lot of hot dogs and lunch meat. [facility] has a Chronic Condition Meals Program that delivers up to 2 meals per day for pts with DM, CAD and/or CHF. Because he has all 3 of these diagnoses, he should qualify for 2 meals. I can email your nurse or MA the application for this program if you don't have one in your clinic. It's super easy and quick to complete, then needs to be faxed to [facility] after you sign it. Thank you.     Kindest Regards,     Dang Clay RN, [facility]  Outpatient Case Management  [phone]  Ext [phone]  [email or web address]

## 2019-07-02 NOTE — PATIENT INSTRUCTIONS
Hypoglycemia (Low Blood Sugar)     Fast-acting sugar includes a cup of nonfat milk.     Too little sugar (glucose) in your blood is called hypoglycemia or low blood sugar. Low blood sugar usually means anything lower than 70 mg/dL. Talk with your healthcare provider about your target range and what level is too low for you. Diabetes itself doesnt cause low blood sugar. But some of the treatments for diabetes, such as pills or insulin, may raise your risk for it. Low blood sugar may cause you to pass out or have a seizure. So always treat low blood sugar right away, but don't overeat.  Special note: Always carry a source of fast-acting sugar and a snack in case of hypoglycemia.   What you may notice  If you have low blood sugar, you may have one or more of these symptoms:  · Shakiness or dizziness  · Cold, clammy skin or sweating  · Feelings of hunger  · Headache  · Nervousness  · A hard, fast heartbeat  · Weakness  · Confusion or irritability  · Blurred vision  · Having nightmares or waking up confused or sweating  · Numbness or tingling in the lips or tongue  What you should do  Here are tips to follow if you have hypoglycemia:   · First check your blood sugar. If it is too low (out of your target range), eat or drink 15 to 20 grams of fast-acting sugar. This may be 3 to 4 glucose tablets, 4 ounces (half a cup) of fruit juice or regular (nondiet) soda, 8 ounces (1 cup) of fat-free milk, or 1 tablespoon of honey. Dont take more than this, or your blood sugar may go too high.  · Wait 15 minutes. Then recheck your blood sugar if you can.  · If your blood sugar is still too low, repeat the steps above and check your blood sugar again. If your blood sugar still has not returned to your target range, contact your healthcare provider or seek emergency care.  · Once your blood sugar returns to target range, eat a snack or meal.  Preventing low blood sugar  Things you can do include the following:   · If your condition  needs a strict treatment plan, eat your meals and snacks at the same times each day. Dont skip meals!  · If your treatment plan lets you change when you eat and what you eat, learn how to change the time and dose of your rapid-acting insulin to match this.   · Ask your healthcare provider if it is safe for you to drink alcohol. Never drink on an empty stomach.  · Take your medicine at the prescribed times.  · Always carry a source of fast-acting sugar and a snack when youre away from home.  Other things to do  Additional tips include the following:  · Carry a medical ID card, a compact USB drive, or wear a medical alert bracelet or necklace. It should say that you have diabetes. It should also say what to do if you pass out or have a seizure.  · Make sure your family, friends, and coworkers know the signs of low blood sugar. Tell them what to do if your blood sugar falls very low and you cant treat yourself.  · Keep a glucagon emergency kit handy. Be sure your family, friends, and coworkers know how and when to use it. Check it regularly and replace the glucagon before it expires.  · Talk with your health care team about other things you can do to prevent low blood sugar.     If you have unexplained hypoglycemia or hypoglycemia several times, call your healthcare provider.   Date Last Reviewed: 5/1/2016  © 2289-9700 [employer]. [address] [place] [address], [place] [address]. All rights reserved. This information is not intended as a substitute for professional medical care. Always follow your healthcare professional's instructions.        Hypoglycemia (Low Blood Sugar)     Fast-acting sugar includes a cup of nonfat milk.     Too little sugar (glucose) in your blood is called hypoglycemia or low blood sugar. Low blood sugar usually means anything lower than 70 mg/dL. Talk with your healthcare provider about your target range and what level is too low for you. Diabetes itself doesnt cause low blood  sugar. But some of the treatments for diabetes, such as pills or insulin, may raise your risk for it. Low blood sugar may cause you to pass out or have a seizure. So always treat low blood sugar right away, but don't overeat.  Special note: Always carry a source of fast-acting sugar and a snack in case of hypoglycemia.   What you may notice  If you have low blood sugar, you may have one or more of these symptoms:  · Shakiness or dizziness  · Cold, clammy skin or sweating  · Feelings of hunger  · Headache  · Nervousness  · A hard, fast heartbeat  · Weakness  · Confusion or irritability  · Blurred vision  · Having nightmares or waking up confused or sweating  · Numbness or tingling in the lips or tongue  What you should do  Here are tips to follow if you have hypoglycemia:   · First check your blood sugar. If it is too low (out of your target range), eat or drink 15 to 20 grams of fast-acting sugar. This may be 3 to 4 glucose tablets, 4 ounces (half a cup) of fruit juice or regular (nondiet) soda, 8 ounces (1 cup) of fat-free milk, or 1 tablespoon of honey. Dont take more than this, or your blood sugar may go too high.  · Wait 15 minutes. Then recheck your blood sugar if you can.  · If your blood sugar is still too low, repeat the steps above and check your blood sugar again. If your blood sugar still has not returned to your target range, contact your healthcare provider or seek emergency care.  · Once your blood sugar returns to target range, eat a snack or meal.  Preventing low blood sugar  Things you can do include the following:   · If your condition needs a strict treatment plan, eat your meals and snacks at the same times each day. Dont skip meals!  · If your treatment plan lets you change when you eat and what you eat, learn how to change the time and dose of your rapid-acting insulin to match this.   · Ask your healthcare provider if it is safe for you to drink alcohol. Never drink on an empty  stomach.  · Take your medicine at the prescribed times.  · Always carry a source of fast-acting sugar and a snack when youre away from home.  Other things to do  Additional tips include the following:  · Carry a medical ID card, a compact USB drive, or wear a medical alert bracelet or necklace. It should say that you have diabetes. It should also say what to do if you pass out or have a seizure.  · Make sure your family, friends, and coworkers know the signs of low blood sugar. Tell them what to do if your blood sugar falls very low and you cant treat yourself.  · Keep a glucagon emergency kit handy. Be sure your family, friends, and coworkers know how and when to use it. Check it regularly and replace the glucagon before it expires.  · Talk with your health care team about other things you can do to prevent low blood sugar.     If you have unexplained hypoglycemia or hypoglycemia several times, call your healthcare provider.   Date Last Reviewed: 5/1/2016  © 2908-4059 [employer]. [address] [place] [address], [place] [address]. All rights reserved. This information is not intended as a substitute for professional medical care. Always follow your healthcare professional's instructions.        Diabetes and Heart Disease     Take your medicines as directed each day, even if you feel fine.   If you have diabetes, you are two to four times more likely to have heart disease than someone without diabetes. This higher risk is due to diabetes, but it is also due to other risk factors for heart disease that happen in people with diabetes. But theres good news. You can help control your health risks by making some changes in your life. You can take steps to reduce your risk of heart disease by half--similar to the risk in people who don't have diabetes.  Your main risk factors  Three major risk factors for heart disease are high blood sugar, high blood pressure, and high levels of lipids. By keeping risk factors  under control, you can help keep your heart and arteries healthy. This may reduce your chances of a heart attack.  · Blood sugar. High blood sugar can make artery walls tough and rough. Plaque (waxy material in the blood) can then build up along the artery walls, making it harder for blood to flow through the arteries. Having high blood sugar increases the chances of having high blood pressure and high cholesterol.  · Blood pressure. When blood pressure is high all the time it causes your heart to work harder to pump blood. Artery walls become damaged. This increases the risk for plaque build up.  · Lipids. The body needs some lipids in the blood to stay healthy. But lipid levels that are too high can damage the artery walls. Lipids include cholesterol and triglycerides. There are two kinds of cholesterol. LDL (bad) cholesterol can damage the arteries. But HDL (good) cholesterol helps clear LDL cholesterol from the blood vessels. This helps keep the arteries healthy. When blood sugar is high, the level of triglycerides in the blood may also be high. High blood triglyceride levels can cause plaque to form.   Other risk factors  Certain lifestyle factors can increase levels of your blood sugar, blood pressure, and lipids. Such increases raise your risk of heart disease:  · Smoking damages the lining of your arteries. This allows plaque to build up in the artery walls. Smoking also constricts (narrows) the arteries. This can raise blood pressure and cause chest pain or angina. Smoking also increases your risk of getting type 2 diabetes.  · Not being active makes it harder for your heart to do its work. Inactivity is linked to many other risk factors, such as high blood pressure and poor cholesterol levels. Inactivity also increases your risk of getting type 2 diabetes.  · Being overweight makes it harder for your body to use insulin. It also makes your heart work too hard. Being overweight is also the main  contributor to the development of type 2 diabetes,   Changes you can make  Following a few simple steps can help keep your risk factors under control. Work with your healthcare team to reach your goals.  · Quitting smoking could save your life. Smoking damages the lining of the blood vessels and raises blood pressure. Smoking also affects how your body uses insulin. This makes it harder to keep blood sugar under control. If you smoke and need help quitting, talk to your healthcare team.   · Testing your blood sugar is the only way to know whether it is under control. Be sure to test your blood sugar yourself. Also get your blood tested in the lab, as directed.  · Monitoring your blood pressure and lipid levels can help you achieve safe levels. Visit your healthcare team as scheduled.  · Taking medicines as directed can help control blood sugar, blood pressure, blood clotting, and/or cholesterol levels.  · Eating right can reduce your risk factors and help you lose weight. Try to limit the amount of processed or refined carbohydrates you eat at one time. Cut back on your total calorie intake. Eat foods low in saturated fat and cholesterol. Eat fiber, including vegetables and whole grains, and cut down on salt. A dietitian or diabetes educator can help form a meal plan that works for you--even if you are on a low budget.   · Being active can help reduce your weight, strengthen your heart, and lower your lipid levels and blood pressure. Exercise and activity are good for your whole body. Talk to your healthcare team about increasing your activity safely over time.  · Keeping your appointments with your healthcare provider helps you stay healthy. Go in for checkups and lab tests as scheduled.  Date Last Reviewed: 5/19/2016  © 3811-5365 [employer]. [address] [place] [address], [place] [address]. All rights reserved. This information is not intended as a substitute for professional medical care. Always follow  "your healthcare professional's instructions.        Diabetes with High Blood Sugar  You have been treated for high blood sugar (hyperglycemia). This may be because of an infection or other illness, eating too many sweets or starches, or not taking enough insulin.  Home care  Monitor and write down your blood sugar level at least twice a day. Do this before breakfast and before dinner. If you take insulin, record your routine insulin dose as well. Also record any additional doses required based on your sliding scale. Do this for the next 3 to 5 days.  High blood sugar may cause symptoms that you can learn to recognize, such as:  · Frequent urination  · Thirst  · Dizziness  · Headache  · Shortness of breath  · Breath that smells fruity  · Nausea or vomiting  · Abdominal pain  · Drowsiness or loss of consciousness  If you have high-blood-sugar symptoms, use a blood or urine test to find out what your blood sugar level is. If it is above your usual range, use the "sliding scale" regular insulin dose prescribed by your healthcare provider. If you were not given a range for your insulin dose, contact your healthcare provider for more advice.  Follow-up care  Follow up with your healthcare provider, or as advised. You may need to meet with your healthcare provider in the next week. You will likely review your blood sugar records together. You may also talk to your provider about adjusting your dose of insulin or other medicine for blood sugar.  When to seek medical advice  Call your healthcare provider right away if these occur:  · High blood sugar symptoms (Symptoms are described above.)  · Blood sugar over 300 mg/dl If you cant reach your healthcare provider, go to a hospital emergency room or urgent care center  Date Last Reviewed: 6/1/2016  © 6508-7411 [employer]. [address] [place] [address], [place] [address]. All rights reserved. This information is not intended as a substitute for professional medical " care. Always follow your healthcare professional's instructions.        Understanding Carbohydrates, Fats, and Protein  Food is a source of fuel and nourishment for your body. Its also a source of pleasure. Having diabetes doesnt mean you have to eat special foods or give up desserts. Instead, your dietitian can show you how to plan meals to suit your body. To start, learn how different foods affect blood sugar.  Carbohydrates  Carbohydrates are the main source of fuel for the body. Carbohydrates raise blood sugar. Many people think carbohydrates are only found in pasta or bread. But carbohydrates are actually in many kinds of foods:  · Sugars occur naturally in foods such as fruit, milk, honey, and molasses. Sugars can also be added to many foods, from cereals and yogurt to candy and desserts. Sugars raise blood sugar.  · Starches are found in bread, cereals, pasta, and dried beans. Theyre also found in corn, peas, potatoes, yam, acorn squash, and butternut squash. Starches also raise blood sugar.   · Fiber is found in foods such as vegetables, fruits, beans, and whole grains. Unlike other carbs, fiber isnt digested or absorbed. So it doesnt raise blood sugar. In fact, fiber can help keep blood sugar from rising too fast. It also helps keep blood cholesterol at a healthy level.  Did you know?  Even though carbohydrates raise blood sugar, its best to have some in every meal. They are an important part of a healthy diet.   Fat  Fat is an energy source that can be stored until needed. Fat does not raise blood sugar. However, it can raise blood cholesterol, increasing the risk of heart disease. Fat is also high in calories, which can cause weight gain. Not all types of fat are the same.  More Healthy:  · Monounsaturated fats are mostly found in vegetable oils, such as olive, canola, and peanut oils. They are also found in avocados and some nuts. Monounsaturated fats are healthy for your heart. Thats because they  lower LDL (unhealthy) cholesterol.  · Polyunsaturated fats are mostly found in vegetable oils, such as corn, safflower, and soybean oils. They are also found in some seeds, nuts, and fish. Polyunsaturated fats lower LDL (unhealthy) cholesterol. So, choosing them instead of saturated fats is healthy for your heart. Certain unsaturated fats can help lower triglycerides.   Less Healthy:  · Saturated fats are found in animal products, such as meat, poultry, whole milk, lard, and butter. Saturated fats raise LDL cholesterol and are not healthy for your heart.  · Hydrogenated oils and trans fats are formed when vegetable oils are processed into solid fats. They are found in many processed foods. Hydrogenated oils and trans fats raise LDL cholesterol and lower HDL (healthy) cholesterol. They are not healthy for your heart.  Protein  Protein helps the body build and repair muscle and other tissue. Protein has little or no effect on blood sugar. However, many foods that contain protein also contain saturated fat. By choosing low-fat protein sources, you can get the benefits of protein without the extra fat:  · Plant protein is found in dry beans and peas, nuts, and soy products, such as tofu and soymilk. These sources tend to be cholesterol-free and low in saturated fat.  · Animal protein is found in fish, poultry, meat, cheese, milk, and eggs. These contain cholesterol and can be high in saturated fat. Aim for lean, lower-fat choices.  Date Last Reviewed: 3/1/2016  © 1863-7337 [employer]. [address] [place] [address], [place] [address]. All rights reserved. This information is not intended as a substitute for professional medical care. Always follow your healthcare professional's instructions.        Long-Term Complications of Diabetes    Diabetes can cause health problems over time. These are called complications. They are more likely to happen if your blood sugar is often too high. Over time, high blood sugar can  damage blood vessels in your body. It is important to keep your blood sugar in your target range. This can help prevent or delay complications from diabetes.  Possible complications  Complications of diabetes include:  · Eye problems, including damage to the blood vessels in the eyes (retinopathy), pressure in the eye (glaucoma), and clouding of the eyes lens (a cataract). Eye problems can eventually lead to irreversible blindness.   · Tooth and gum problems (periodontal disease), causing loss of teeth and bone  · Blood vessel (vascular) disease leading to circulation problems, heart attack or stroke, or a need for amputation of a limb   · Problems with sexual function leading to erectile dysfunction in men and sexual discomfort in women   · Kidney disease (nephropathy) can eventually lead to kidney failure, which may require dialysis or kidney transplant   · Nerve problems (neuropathy), causing pain or loss of feeling in your feet and other parts of your body, potentially leading to an amputation of a limb   · High blood pressure (hypertension), putting strain on your heart and blood vessels  · Serious infections, possibly leading to loss of toes, feet, or limbs  How to avoid complications  The serious consequences of these complications may be avoidable for most people with diabetes by managing your blood glucose, blood pressure, and cholesterol levels. This can help you feel better and stay healthy. You can manage diabetes by tracking your blood sugar. You can also eat healthy and exercise to avoid gaining weight. And you should take medicine if directed by your healthcare provider.  Date Last Reviewed: 5/1/2016  © 1802-2472 [employer] [address] [place] [address], [place] [address]. All rights reserved. This information is not intended as a substitute for professional medical care. Always follow your healthcare professional's instructions.        Understanding Coronary Artery Disease (CAD)    To  understand coronary artery disease (CAD), you need to know how your heart works. Your heart is a muscle that pumps blood throughout your body. To work right, your heart needs a steady supply of oxygen. It gets this oxygen from blood supplied by the coronary arteries.     Healthy artery   Healthy artery. When a coronary artery is healthy and has no blockages, blood flows through easily. Healthy arteries can easily supply the oxygen-rich blood your heart needs.     Damaged artery   Damaged artery. Coronary artery disease begins when damage to the artery lining leads to the buildup of fat-like substances and cholesterol along the artery wall. This is called plaque. This damage could be caused by things like high blood pressure or smoking. This plaque buildup begins to narrow the arteries carrying blood to the heart. This is called atherosclerosis,     Narrowed artery   Narrowed artery. As more plaque builds up, your artery has trouble supplying blood to your heart muscle when it needs it most, such as during exercise. You may not feel any symptoms when this happens. Or you may feel angina--pressure, tightness, achiness, or pain in your chest, jaw, neck, back, or arm.     Blocked artery   Blocked artery. A piece of plaque may break off and completely block the artery. Or a blood clot may plug the narrowed artery. When this happens, blood flow is blocked from reaching the heart. Without oxygen-rich blood, part of the heart muscle becomes damaged and stops working. You may feel crushing pressure or pain in or around your chest. This is a heart attack (acute myocardial infarction, or AMI) and is a medical emergency.     Date Last Reviewed: 3/28/2016  © 7517-1658 [employer]. [address] [place] [address], [place] [address]. All rights reserved. This information is not intended as a substitute for professional medical care. Always follow your healthcare professional's instructions.        Heart Disease  Education    The heart beats 60 to 100 times per minute, 24 hours a day. This equals almost 1000,000 times a day. It pumps blood with oxygen and nutrients to the tissues and organs of the body. But the heart is a muscle and needs its own supply of blood. Blood flow to the heart is supplied by the coronary arteries. Coronary artery disease (atherosclerosis) is a result of cholesterol, saturated fat, and calcium deposits (plaques) that build up inside the walls. This causes inflammation within the coronary arteries. These plaques narrow the artery and reduce blood flow to the heart muscle. The reduction in blood flow to the heart muscle decreases oxygen supply to the heart. If the narrowing is significant enough, the oxygen supply to one or more regions of the heart can be temporarily or permanently shut down. This can cause chest pain, and possibly death of heart tissue (heart attack).  Types of chest pain  Angina is the name for pain in the heart muscle. Angina is a warning sign of serious heart disease. When untreated it can lead to a heart attack, also known as acute myocardial infarction, or AMI. Angina occurs when there is not enough blood and oxygen flowing to the heart for the amount of work it is doing. This most often happens during physical exertion, when the heart is working hardest. It is usually relieved by rest or nitroglycerin. Angina may also occur after a large meal when extra blood is sent to the digestive organs and less goes to the heart. In the case of advanced or unstable heart disease, angina can occur at rest or awaken you from sleep. Angina usually lasts from a few minutes up to 20 minutes or more. When treated early, the effects of angina can be reversed without permanent damage to the heart. Angina is a serious condition and needs to be evaluated by a medical professional immediately.  There are two types of angina -- stable and unstable:  · Stable angina usually occurs with a  predictable level of activity. Being stable, its character, severity, and occurrence do not change much over time. It usually starts with activity, and resolves with rest or taking your medicine as instructed by your doctor. The symptoms usually do not last long.  · Unstable angina changes or gets worse over time. It is different from whatever you are used to. It may feel different or worse, begin without cause, occur with exercise or exertion, wake you up from sleep, and last longer. It may not respond in the same way as it does when you take your usual medicines for an attack. This type of angina can be a warning sign of an impending heart attack.     A heart attack is usually the result of a blood clot that suddenly forms in a coronary artery that has been narrowed with plaque. When this occurs, blood flow may be cut off to a part of the heart muscle, causing the cells to die. This weakens the pumping action of the heart, which affects the delivery of blood to all the other organs in the body including the brain. This damage is not reversible. However, early treatment can limit the amount of damage.  The pain you feel with angina and a heart attack may have a similar quality. However, it is usually different in intensity and duration. Here are some typical descriptions of a heart attack:  · It is most often experienced as a squeezing, crushing, pressure-like sensation in the center of the chest.  · It is sometimes described as something heavy sitting on my chest.  · It may feel more like a bad case of indigestion.  · The pain may spread from the chest to the arm, shoulder, throat or jaw.  · Sometimes the pain is not felt in the chest at all, but only in the arm, shoulder, throat or jaw.  · There may also be nausea, vomiting, dizziness or light-headedness, sweating and trouble breathing.  · Palpitations, or your heart beating rapidly  · A new, irregular heart beat  · Unexplained weakness  You may not be able to  "tell the difference between "bad" angina and a heart attack at home. Seek help if your symptoms are different than usual. Do not be in denial or just try to "tough it out."  Call 911  This is the fastest and safest way to get to the emergency department. The paramedics can also start treatment on the way to the hospital, saving valuable time for your heart.  · If the angina gets worse, if it continues, or if it stops and returns, call 911 immediately. Do not delay. You may be having a heart attack.  · After you call 911, take a second tablet or spray unless instructed otherwise. When repeating doses, sit down if possible, because it can make you feel lightheaded or dizzy. Wait another 5 minutes. If the angina still does not go away, take a third tablet or spray. Do not take more than 3 tablets or sprays within 15 minutes. Stay on the phone with 911 for further instruction.  · Your healthcare provider may give you slightly different instructions than those above. If so, follow them carefully.  Do not wait until symptoms become severe to call 911.  Other reasons to call 911 include:  · Trouble breathing  · Feeling lightheaded, faint, or dizzy  · Rapid heart beat  · Slower than usual heart rate compared to your normal  · Angina with weakness, dizziness, fainting, heavy sweating, nausea, or vomiting  · Extreme drowsiness, confusion  · Weakness of an arm or leg or one side of the face  · Difficulty with speech or vision  When to seek medical care  Remember, the signs and symptoms of a heart attack are not always like they are on TV. Sometimes they are not so obvious. You may only feel weak, or just not right. If it is not clear or if you have any doubt, call for advice.  · Seek help if there is a change in the type of pain, if it feels different, or if your symptoms are mild.  · Do not drive yourself. Have someone else drive you. If no one can drive, call 911.  · Do not delay. Fast diagnosis and treatment can prevent or " "limit the amount of heart damage during a heart attack.  · Do not go to your doctor's office or a clinic as they may not be able to provide all the testing and treatment required for this condition.  · If your doctor has given you medicine to take when symptoms occur, take them but don't delay getting help trying to locate medicines.  What happens in the emergency department  The emergency department is connected to your local emergency medical system (EMS) through 911. That's why during a cardiac emergency, calling 911 is the fastest way to get help. The goal of the emergency department is to rapidly screen, evaluate, and treat people.  Once you are there, an electrocardiogram (ECG or heart tracing) will be done. Blood samples may be taken to look for the presence of heart enzymes that leak from damaged heart cells and show if a heart attack is occurring. You will often be evaluated by a heart specialist (cardiologist) who decides the best course of action. In the case of severe angina or early heart attack, and depending on the circumstances, powerful "clot busting" medicines can be used to dissolve blood clots in the coronary artery. In other cases, you may be taken to a cardiac catheterization lab. Here, a tiny balloon-tipped catheter is advanced through blood vessels to the heart. There the balloon is inflated pushing open the blood vessel restoring blood flow.  Risk factors for heart disease  Risk factors for heart disease are a combination of genetic and lifestyle. Many risk factors work by either directly or indirectly damaging the blood vessels of the heart, or by increasing the risk of forming blood or cholesterol clots, which then clog up and block the arteries.     Examples of physical lifestyle risk factors:  · Cigarette smoking  · High blood pressure  · High blood cholesterol  · Use of stimulant drugs such as cocaine, crack, and amphetamines  · Eating a high-fat, high-cholesterol " meal  · Diabetes   · Obesity which increases risk for diabetes and high blood pressure  · Lack of regular physical activity     Examples of emotional lifestyle factors:  · Chronic high stress levels release stress hormones. These raise blood pressure and cholesterol level and makes blood clot more easily.  · Held-in anger, hostile or cynical attitude  · Social and emotional isolation, lack of intimacy  · Loss of relationship  · Depression  Other factors that increase the risk of heart attack that you cannot control :  · Age. The older you get beyond 40, the greater is your risk of significant coronary artery disease.  · Gender. More men than women get heart disease; but once past menopause, women who are not taking estrogen replacement have the same risk as men for a heart attack.  · Family history. If your mother, father, brother or sister has coronary artery disease, your risk of having it is higher than a person your age without this family history.  What can you do to decrease your risk  To reduce your risk of heart disease:  · Get regular checkups with your doctor.  · Take your medicines for blood pressure, cholesterol or diabetes as directed.  · Watch your diet. Eat a heart healthy diet choosing fresh foods, less salt, cholesterol, and fat  · Stop smoking. Get help if needed.  · Get regular exercise.  · Manage stress.  · Carry a list of medicines and doses in your wallet.  Date Last Reviewed: 12/30/2015  © 6834-5679 [employer]. [address] [place] [address], [place] [address]. All rights reserved. This information is not intended as a substitute for professional medical care. Always follow your healthcare professional's instructions.        Heart Failure: Warning Signs of a Flare-Up  You have a condition called heart failure. Once you have heart failure, flare-ups can happen. Below are signs that can mean your heart failure is getting worse. If you notice any of these warning signs, call your  healthcare provider.  Swelling    · Your feet, ankles, or lower legs get puffier.  · You notice skin changes on your lower legs.  · Your shoes feel too tight.  · Your clothes are tighter in the waist.  · You have trouble getting rings on or off your fingers.  Shortness of breath  · You have to breathe harder even when youre doing your normal activities or when youre resting.  · You are short of breath walking up stairs or even short distances.  · You wake up at night short of breath or coughing.  · You need to use more pillows or sit up to sleep.  · You wake up tired or restless.  Other warning signs  · You feel weaker, dizzy, or more tired.  · You have chest pain or changes in your heartbeat.  · You have a cough that wont go away.  · You cant remember things or dont feel like eating.  Tracking your weight  Gaining weight is often the first warning sign that heart failure is getting worse. Gaining even a few pounds can be a sign that your body is retaining excess water and salt. Weighing yourself each day in the morning after you urinate and before you eat, is the best way to know if you're retaining water. Get a scale that is easy to read and make sure you wear the same clothes and use the same scale every time you weigh. Your healthcare provider will show you how to track your weight. Call your doctor if you gain more than 2 pounds in 1 day, 5 pounds in 1 week, or whatever weight gain you were told to report by your doctor. This is often a sign of worsening heart failure and needs to be evaluated and treated before it compromises your breathing. Your doctor will tell you what to do next.    Date Last Reviewed: 3/15/2016  © 0289-8972 [employer]. [address] [place] [address], [place] [address]. All rights reserved. This information is not intended as a substitute for professional medical care. Always follow your healthcare professional's instructions.

## 2019-07-02 NOTE — TELEPHONE ENCOUNTER
Yes please follow up on this meal program. And, he needs to take metformin. He can break the pills into 4 pieces if he needs to so he can swallow them.

## 2019-07-08 ENCOUNTER — TELEPHONE (OUTPATIENT)
Dept: RADIOLOGY | Facility: HOSPITAL | Age: 64
End: 2019-07-08

## 2019-07-08 ENCOUNTER — TELEPHONE (OUTPATIENT)
Dept: RHEUMATOLOGY | Facility: CLINIC | Age: 64
End: 2019-07-08

## 2019-07-08 DIAGNOSIS — M33.13 DERMATOMYOSITIS: Primary | ICD-10-CM

## 2019-07-08 NOTE — TELEPHONE ENCOUNTER
----- Message from Tami Pena RT sent at 7/8/2019  3:59 PM CDT -----  Contact: RADIOLOGY  2ND REQUEST   Hello,  This patient is scheduled tomorrow for a CT w/ contrast. We will need a recent creatinine on this patient (within the last 30 days). Please schedule patient for STAT labs 1 hr prior to radiology appointment and Please call the patient and inform them of the changes. If you have any questions please contact Radiology at [phone].  Thank you,    Radiology Dept

## 2019-07-08 NOTE — TELEPHONE ENCOUNTER
----- Message from Tami Pena RT sent at 7/8/2019 11:44 AM CDT -----  Contact: RADIOLOGY  Hello,  This patient is scheduled tomorrow for a CT w/ contrast. We will need a recent creatinine on this patient (within the last 30 days). Please schedule patient for STAT labs 1 hr prior to radiology appointment and Please call the patient and inform them of the changes. If you have any questions please contact Radiology at [phone].  Thank you,    Radiology Dept

## 2019-07-09 ENCOUNTER — TELEPHONE (OUTPATIENT)
Dept: RHEUMATOLOGY | Facility: CLINIC | Age: 64
End: 2019-07-09

## 2019-07-09 ENCOUNTER — HOSPITAL ENCOUNTER (OUTPATIENT)
Dept: RADIOLOGY | Facility: HOSPITAL | Age: 64
Discharge: HOME | End: 2019-07-09
Attending: INTERNAL MEDICINE
Payer: MEDICARE

## 2019-07-09 DIAGNOSIS — R76.8 OTHER SPECIFIED ABNORMAL IMMUNOLOGICAL FINDINGS IN SERUM: ICD-10-CM

## 2019-07-09 DIAGNOSIS — M33.13 DERMATOMYOSITIS: ICD-10-CM

## 2019-07-09 PROCEDURE — 74177 CT CHEST ABDOMEN PELVIS WITH CONTRAST (XPD): ICD-10-PCS | Mod: 26,HCNC,, | Performed by: RADIOLOGY

## 2019-07-09 PROCEDURE — 71260 CT CHEST ABDOMEN PELVIS WITH CONTRAST (XPD): ICD-10-PCS | Mod: 26,HCNC,, | Performed by: RADIOLOGY

## 2019-07-09 PROCEDURE — 74177 CT ABD & PELVIS W/CONTRAST: CPT | Mod: 26,HCNC,, | Performed by: RADIOLOGY

## 2019-07-09 PROCEDURE — 71260 CT THORAX DX C+: CPT | Mod: 26,HCNC,, | Performed by: RADIOLOGY

## 2019-07-09 PROCEDURE — 25500020 PHARM REV CODE 255: Mod: HCNC | Performed by: INTERNAL MEDICINE

## 2019-07-09 PROCEDURE — 74177 CT ABD & PELVIS W/CONTRAST: CPT | Mod: TC,HCNC

## 2019-07-09 RX ADMIN — IOHEXOL 30 ML: 350 INJECTION, SOLUTION INTRAVENOUS at 11:07

## 2019-07-09 RX ADMIN — IOHEXOL 100 ML: 350 INJECTION, SOLUTION INTRAVENOUS at 01:07

## 2019-07-09 NOTE — TELEPHONE ENCOUNTER
----- Message from Tami Pena, RT sent at 7/9/2019  7:21 AM CDT -----  Contact: Radiology  3RD REQUEST  NURSE SCHEDULED APPT AND THERE ARE NO LAB ORDERS ATTACHED TO PATIENTS APPT,,, WE NEED A UPDATED CREATINE LAB PUT IN SYSTEM STAT IN ORDER FOR PATIENT TO HAVE TEST DONE TODAY,,,  IF YOU HAVE ANY QUESTIONS PLEASE CALL RADIOLOGY AT EXT [phone]

## 2019-07-09 NOTE — TELEPHONE ENCOUNTER
Dr. Lucero, a Creatinine is needed for this patient to have his CT done. Please put in a CMP order. Thank you,     Mychal

## 2019-07-11 ENCOUNTER — OUTPATIENT CASE MANAGEMENT (OUTPATIENT)
Dept: ADMINISTRATIVE | Facility: OTHER | Age: 64
End: 2019-07-11

## 2019-07-11 NOTE — PROGRESS NOTES
7/11/19 - [x] Called and reviewed disaster plan with patient/caregiver.  Provided emergency phone number for patient's [place].   [] Attempted to reach patient/caregiver to review disaster plan.  Left a voice message with the phone number for patient's [place] Office of Emergency Preparedness.     Reviewed with Patient/Caregiver:  [x] Patient to have a supply of water, non-perishable food items, flashlights and batteries  [x] Patient to bring all medications if evacuates:  at least a five day supply preferably in the medication bottles, in case displaced for longer than 5 days  [x] Patient to bring any DME needed (walkers, wheelchairs, shower chairs, nebulizer machine, etc.)   [x] If Diabetic, patient to bring glucometer and monitoring supplies.   [] If Hypertension, patient to bring blood pressure cuff  [] If CHF, patient to bring scale  [] If tube feeds, patient to bring tube feedings and feeding supplies.  [] If on oxygen,  patient to bring all oxygen supplies (Cannula, portable tanks, concentrator).  Patient will contact oxygen supply company to find out the nearest location of a supply company near evacuated location. ([employer]: [phone], [facility]: [phone], [facility], [facility]:[phone], [employer]: [phone]), [facility] -[phone].  [] If on hemodialysis, patient should already have a plan in place with dialysis center. If patient does not know where to evacuate to in order to be close to a dialysis center, patient/caregiver to reach out to regular dialysis center for further direction. ([facility]  service line: [phone], [facility]  service line [phone])  [] If receiving treatment at an infusion center, patient/caregiver to contact the infusion center to find out which infusion center they have a contract with where patient plans to evacuate.      Office of Emergency Preparedness Phone number:  [] [place]: [phone]  [] [facility]:  [phone]  [] [place]: [phone]  [] [place]: [phone]  [] [place]: [phone]  [] [place]: [phone]  [] [facility]: [phone]  [x] [facility]: [phone]  [] [facility] [place]: [phone]  [] [facility]: [phone]  [] [facility]: [phone]  [] [place]: [phone]  [] [facility]: [phone]    [] [facility]:  [phone]   [] [facility]:  [phone]   [] [facility]:  [phone]   [] [facility]:  [phone]   [] [facility]:  [phone]   [] [facility]: [phone]   [] [facility]:  [phone]   [] [facility]:  [phone]   [] [facility]:  [phone]   [] [facility]:  [phone]   [] [facility]:  [phone]   [] [place]:  [phone]   [] [facility]:  [phone]   [] [facility]:  [phone]   [] [facility]:  [phone]   [] [facility]: [phone]   [] [facility]:  [phone]   [] [facility]: [phone]   [] [facility]: [phone]

## 2019-07-12 RX ORDER — PREDNISONE 20 MG/1
TABLET ORAL
Qty: 42 TABLET | Refills: 0 | OUTPATIENT
Start: 2019-07-12

## 2019-07-16 ENCOUNTER — OUTPATIENT CASE MANAGEMENT (OUTPATIENT)
Dept: ADMINISTRATIVE | Facility: OTHER | Age: 64
End: 2019-07-16

## 2019-07-16 ENCOUNTER — TELEPHONE (OUTPATIENT)
Dept: INTERNAL MEDICINE | Facility: CLINIC | Age: 64
End: 2019-07-16

## 2019-07-16 NOTE — PROGRESS NOTES
7/16/19 - SUMMARY: Phone contact made with pt today for follow up with OPCM. He advised his blood sugar is averaging around 180-190, both fasting and pc meals. He has been taking prednisone prescribed by his dermatologist for a skin rash and it has greatly increased his appetite. He has not yet started receiving Chronic Condition Meals. He has also not yet rec'd the literature mailed by this CM .   INTERVENTION: Advised Dr Lambert staff sent his Chronic Conditions Meal Program application to [facility] and gave him the number to call and check on status of meals if he does not begin to receive them in the mail in the next few days. Discussed blood sugars need to be  fasting and <180 non-fasting for diabetes to be considered controlled. Encouraged to eat low carb snacks, like peanut butter, ham or turkey sandwiches for increased appetite to avoid causing elevations in blood sugar. He continues to report he is not taking Metformin, this time advising his dermatologist told him to hold it while taking prednisone. This CM reviewed the AVS given by dermatologist and there were no instructions to hold Metformin while taking Prednisone. Instructed this CM messaged Dr Lambert that he has not been taking Metformin after last contact and he(Dr Lambert) responded that he needs to definitely be taking Metformin and it is OK for him to break it into 2 or 4 pieces if needed, in order to swallow it. Encouraged him to resume it today and he advised he will. Encouraged him to read the literature sent by this CM when it arrives and write down any questions he may have for my next contact.   PLAN: Follow up in two weeks. Call between 10-11 AM, per his request. Review literature about diabetes, including whether he has any hypo or hyperglycemia symptoms. Is he receiving Chronic Conditions Meals? Is he taking Metformin? How are fasting and non-fasting blood sugars?    Todays OPCM Self-Management Care Plan was reviewed with the  patients/caregivers input based on identified barriers from todays and previous assessments.  Goals were reviewed today with the patient/caregiver and the patient has agreed to continue to work towards these goals to improve his/her overall well-being. Patient verbalized understanding of the care plan, goals, and all of today's instructions. Encouraged patient/caregiver to communicate with his/her physician and health care team about health conditions and the treatment plan.  Provided my contact information today and encouraged patient/caregiver to call me with any questions as needed. Dang Clay RN

## 2019-07-16 NOTE — TELEPHONE ENCOUNTER
Call returned to Dang, informed application for pt was received and faxed. Dang to inform pt and advise.

## 2019-07-16 NOTE — TELEPHONE ENCOUNTER
----- Message from Dang Clay RN sent at 7/16/2019 10:15 AM CDT -----  Contact: Dang Clay RN  Good morning,     I am checking to see if Tory rec'd the [employer] Application for Mr [place] that I emailed her on 7/2/19 and whether it has been completed and submitted to [employer]. Thank you.    Kindest Regards,     Dang Clay RN, [facility]  Outpatient Case Management  [phone]  Ext [phone]  [email or web address]

## 2019-07-16 NOTE — PROGRESS NOTES
Summary: LCSW received referral from OPCM RN for support services, PHQ9 (score = 16). Phoned patient, completed social assessment, PHQ9 (score = 14) and plan of care. Patient is a 63 yo  male ( from wife for 10 years). He has 5 children, only one daughter involved in his life. His daughter moved to [place] and now transports him to MD appointments and assists him in completing applications, etc. Patient stated that he completed the 9th grade but only reads at a 3rd grade level. Patient lives with his father who recently had a stroke affecting his speech. His aunt was recently diagnosed with Alzheimer disease and his sister had a stroke. Patient stated that he gets depressed because he cannot maintain the family home in which they live. He stated that 2 weeks ago he was approved for MOW and for personal care services through [employer].   Patient stated that his father uses a tub bench but the tub bench which his sister gave him was too large for his tub. He stated that they do not have grab bars. He requested resources for adding grab bars in their bathroom. Reviewed the [employer] OTC benefit with patient and items for bath safety. He stated that he may have ordered the bath mat, noted items for bath safety through OTC catalog.   Patient's monthly income is $880 after $250 deduction for repayment of a tax bill.   Patient stated that he had been in [facility] about 5 months ago. He stated that he felt that it was helpful and would like to return to the program. Patient agreed to referral to the program. He expressed concern about conflicts with medical appointments and the IOP.   Patient stated that he does not have an advanced directive; however, he has the forms and plans to name his daughter as his Medical POA. Provided education about advanced care planning.     Interventions:  · Completed social assessment, PHQ9 and plan of care.   · Collaborated with OPCM RN.   · Provided resource for home modifications  for personal safety.   · Provided referral for IOP.   · Reviewed [employer] OTC items for personal safety.     Plan:  Follow up next week to ensure referral to IOP is complete.     Todays OPCM Self-Management Care Plan was developed with the patients/caregivers input and was based on identified barriers from todays assessment.  Goals were written today with the patient/caregiver and the patient has agreed to work towards these goals to improve his/her overall well-being. Patient verbalized understanding of the care plan, goals, and all of today's instructions. Encouraged patient/caregiver to communicate with his/her physician and health care team about health conditions and the treatment plan.  Provided my contact information today and encouraged patient/caregiver to call me with any questions as needed.    7/17/19 [facility]oned [facility], left message to call regarding referral. ([phone]). Received return phone call from [facility]. He stated that he has called patient and is awaiting return phone call. Anticipates being able to re-admit him for services.

## 2019-07-22 ENCOUNTER — PATIENT OUTREACH (OUTPATIENT)
Dept: ADMINISTRATIVE | Facility: HOSPITAL | Age: 64
End: 2019-07-22

## 2019-07-23 ENCOUNTER — OUTPATIENT CASE MANAGEMENT (OUTPATIENT)
Dept: ADMINISTRATIVE | Facility: OTHER | Age: 64
End: 2019-07-23

## 2019-07-23 ENCOUNTER — TELEPHONE (OUTPATIENT)
Dept: RHEUMATOLOGY | Facility: CLINIC | Age: 64
End: 2019-07-23

## 2019-07-23 NOTE — PROGRESS NOTES
Summary: Phoned patient. Patient confirmed that he has begun [facility]. He attended two days and had to miss today due to an MD appointment that was cancelled. He expressed stress about having to cancel going to IOP today and will miss tomorrow due to having 2 MD appointments. He stated that he has much stress related to having multiple health issues, citing that the steroids and the change in diabetes medications have affected his thirst and hunger and he feels that he has lost the gains to his health that he had recently made. He said that his Bg is 489 this morning and his blood pressure is 190/72. Informed him that [facility] will notify his nurse.   Patient also reported that his father had begun getting MOW and sitter service through [employer] but he has learned that he did not qualify. Noted continued problems with being able to keep his home clean and to cook for himself. He stated that he has not begun receiving the [employer] meals yet.     Interventions: Collaborated with OPCM RN regarding patient's health concerns. Phone call to OPCM RN.   Verified initiation of IOP.     Plan: Follow up next week. Verification of services, [employer].     Todays hospitals Self-Management Care Plan was developed with the patients/caregivers input and was based on identified barriers from todays assessment.  Goals were written today with the patient/caregiver and the patient has agreed to work towards these goals to improve his/her overall well-being. Patient verbalized understanding of the care plan, goals, and all of today's instructions. Encouraged patient/caregiver to communicate with his/her physician and health care team about health conditions and the treatment plan.  Provided my contact information today and encouraged patient/caregiver to call me with any questions as needed.

## 2019-07-23 NOTE — PROGRESS NOTES
"7/23/19 - SUMMARY: Phone contact made with pt today related to phoned report from Amanda Whipple, [facility] LCSW that pt's blood sugar was >400 this AM.   INTERVENTION: Questioned pt about how he if feeling and he advised he is OK. His vision was "dim" earlier, which he reports is a symptom he has when his blood sugar is elevated, just like his hands shaking are a symptom of hypoglycemia. He rechecked blood sugar about 20 minutes prior to this CM's call and it was down to 270 and he has taken his lunch time dose of Novolog. He has also resumed taking his Metformin. Encouraged him to seek treatment at the ED or call his provider's office in the future when his blood sugar is >400 and he verbalized understanding. Advised this CM will plan to follow up two weeks from today and he agreed to this. Will attempt afternoon contact time as he is attending IOP. Provided him with the [employer] phone number to call and check on the status of his Chronic Condition Meal delivery.   PLAN: Follow up in two weeks. Review recent blood sugars. Is he taking all diab meds as ordered? Discuss diab diet, foods to choose, those to avoid, portion size and number of carbs per meal.     Todays [facility] Self-Management Care Plan was reviewed with the patients/caregivers input based on identified barriers from todays and previous assessments.  Goals were reviewed today with the patient/caregiver and the patient has agreed to continue to work towards these goals to improve his/her overall well-being. Patient verbalized understanding of the care plan, goals, and all of today's instructions. Encouraged patient/caregiver to communicate with his/her physician and health care team about health conditions and the treatment plan.  Provided my contact information today and encouraged patient/caregiver to call me with any questions as needed. Dang Clay RN      "

## 2019-07-23 NOTE — PROGRESS NOTES
Subjective:      Patient ID: Crow Green is a 64 y.o. male.    Chief Complaint: Lumbar Spine Pain (L-Spine); Follow-up; and Results     is here today for evaluation as a new patient  Has had the symptoms for over a year getting worse over the past several months  He has had ongoing issues with ambulation and pain in his back.  Radiates down his legs into his feet with associated numbness and tingling.  Pain rated 7/10 in the back today.  Radiates equally into bilateral lower extremities with associated numbness and tingling.  Currently uses a rolling walker for stability purposes  Pain worse with activity and better with rest.  Has been to pain management for this in the past.  He was recommended epidural steroid injections however he was not able to schedule secondary to being on blood thinning medications.  Has also tried physical therapy however not recently  Currently takes hydrocodone-also takes Eliquis as well as aspirin    Patient presents today for MRI follow-up of the lumbar spine. Patient states that overall his symptoms are the same, however he does report increased pain into left hip after twisting injury when he was getting out of the tub.           Review of Systems   Constitutional: Negative for activity change, appetite change and chills.   HENT: Negative for hearing loss, sore throat and tinnitus.    Eyes: Negative for pain, discharge and itching.   Cardiovascular: Negative for chest pain.   Gastrointestinal: Negative for abdominal pain.   Endocrine: Negative for cold intolerance and heat intolerance.   Genitourinary: Negative for difficulty urinating and dysuria.   Musculoskeletal: Positive for back pain and gait problem.   Allergic/Immunologic: Negative for environmental allergies.   Neurological: Positive for weakness. Negative for dizziness, tremors, light-headedness and headaches.   Hematological: Negative for adenopathy.   Psychiatric/Behavioral: Negative for agitation,  behavioral problems and confusion.         Objective:       Physical Exam:  Nursing note and vitals reviewed.    Constitutional: He appears well-developed and well-nourished. No distress.     Eyes: Pupils are equal, round, and reactive to light. EOM are normal.     Cardiovascular: Intact distal pulses.     Abdominal: Soft.     Psych/Behavior: He is alert. He is oriented to person, place, and time. He has a normal mood and affect.     Neurological:        DTRs: Tricep reflexes are 2+ on the right side and 2+ on the left side. Bicep reflexes are 2+ on the right side and 2+ on the left side. Brachioradialis reflexes are 2+ on the right side and 2+ on the left side. Achilles reflexes are 2+ on the right side and 2+ on the left side.     General    Nursing note and vitals reviewed.  Constitutional: He is oriented to person, place, and time. He appears well-developed and well-nourished. No distress.   HENT:   Head: Normocephalic and atraumatic.   Nose: Nose normal.   Eyes: EOM are normal. Pupils are equal, round, and reactive to light.   Neck: Normal range of motion. Neck supple.   Cardiovascular: Intact distal pulses.    Pulmonary/Chest: Effort normal. No respiratory distress.   Abdominal: Soft. He exhibits no distension.   Neurological: He is alert and oriented to person, place, and time. No cranial nerve deficit.   Psychiatric: He has a normal mood and affect. His behavior is normal.         Right Ankle/Foot Exam     Tests   Heel Walk: unable to perform  Tiptoe Walk: unable to perform  Single Heel Rise: able to perform  Double Heel Rise: unable to perform double heel rise    Left Ankle/Foot Exam     Tests   Heel Walk: unable to perform  Tiptoe Walk: unable to perform  Single Heel Rise: able to perform  Double Heel Rise: able to perform  Back (L-Spine & T-Spine) / Neck (C-Spine) Exam     Tenderness Posterior midline palpation reveals tenderness of the Upper L-Spine and Lower L-Spine. Right paramedian tenderness of the  Lower L-Spine, Sacrum and Upper L-Spine. Left paramedian tenderness of the Lower L-Spine, Sacrum and Upper L-Spine.     Back (L-Spine & T-Spine) Range of Motion   Lateral bend right: abnormal   Lateral bend left: abnormal     Neck (C-Spine) Range of Motion   Flexion:     Normal  Extension: Normal  Right Lateral Bend: normal  Left Lateral Bend: normal  Right Rotation: normal  Left Rotation: normal    Spinal Sensation   Right Side Sensation  C-Spine Level: normal   L-Spine Level: normal  Left Side Sensation  C-Spine Level: normal  L-Spine Level: normal    Back (L-Spine & T-Spine) Tests   Right Side Tests  Squat Test: unable to perform  Left Side Tests  Squat: unable to perform    Neck (C-Spine) Tests   Spurling's Test   Left:  Negative  Right: negative      Muscle Strength   Right Upper Extremity   Biceps: 5/5/5   Deltoid:  5/5  Triceps:  5/5  Wrist extension: 5/5/5   Wrist flexion: 5/5/5   Finger Flexors:  5/5  Finger Extensors:  5/5  Left Upper Extremity  Biceps: 5/5/5   Deltoid:  5/5  Triceps:  5/5  Wrist extension: 5/5/5   Wrist flexion: 5/5/5   Finger Flexors:  5/5  Finger Extensors:  5/5  Right Lower Extremity   Hip Abduction: 5/5   Hip Flexion: 5/5   Hip Extensors: 5/5  Quadriceps:  5/5   Hamstrin/5   Anterior tibial:  5/5/5  Gastrocsoleus:  5/5/5  EHL:  5/5  Left Lower Extremity   Hip Abduction: 5/5   Hip Flexion: 5/5   Hip Extensors: 5/5  Quadriceps:  5/5   Hamstrin/5   Anterior tibial:  5/5/5   Gastrocsoleus:  5/5/5  EHL:  5/5    Reflexes     Left Side  Biceps:  2+  Triceps:  2+  Brachioradialis:  2+  Quadriceps:  2+  Post Tibial:  2+  Achilles:  2+  Left Euceda's Sign:  Absent    Right Side   Biceps:  2+  Triceps:  2+  Brachioradialis:  2+  Quadriceps:  2+  Post Tibial:  2+  Achilles:  2+  Right Euceda's Sign:  absent    I  reviewed all pertinent imaging regarding this case.  MRI Lumbar:   Vertebral body heights unchanged.  Mild grade 1 retrolisthesis of L5 on S1.  L5-S1 Modic type 1 endplate  changes noted.  No concerning marrow signal abnormality.  Multilevel disc desiccation present with progression of height loss at L5-S1.    Conus terminates normally.  Intra-abdominal/pelvic visualized structures are unremarkable.    L1-L2: Similar circumferential disc bulge present with tiny central to paracentral extrusion component.  No significant spinal canal stenosis or neural foraminal narrowing.    L2-L3: No spinal canal stenosis or neural foraminal narrowing with facet/ligamentum flavum degenerative hypertrophy changes.    L3-L4: Mild circumferential disc bulging noted without spinal canal stenosis.  Mild bilateral inferior neural foraminal narrowing secondary to facet/ligamentum flavum hypertrophy.    L4-L5: Circumferential disc bulging noted with small left paracentral-foraminal extrusion component extending superiorly.  These findings in combination with facet/ligamentum flavum hypertrophy cause moderate bilateral neural foraminal narrowing.  There is mild effacement of the exited nerve roots greater on the left.    L5-S1: Retrolisthesis in similar central left paracentral disc extrusion with inferior extension.  Degree of mass effect on the thecal sac and spinal canal stenosis are similar.  Involvement of the descending left S2 nerve root is also unchanged.    Severe bilateral neural foraminal narrowing present.  Facet degenerative changes noted.    Assessment:     1. Lumbar spondylosis    2. Lumbar radiculopathy    3. Osteoarthritis of spine with radiculopathy, lumbar region    4. Bleeding      Plan:     Lumbar spondylosis  -     CBC auto differential; Future; Expected date: 07/24/2019  -     X-Ray Chest PA And Lateral Pre-OP; Future; Expected date: 07/24/2019  -     Comprehensive metabolic panel; Future; Expected date: 07/24/2019  -     EKG 12-lead; Future  -     Protime-INR; Future; Expected date: 07/24/2019  -     Type And Screen Preop; Future; Expected date: 07/24/2019  -     X-Ray Lumbar Spine  Flexion And Extension Only; Future; Expected date: 07/24/2019  -     CT Lumbar Spine Without Contrast; Future; Expected date: 07/24/2019    Lumbar radiculopathy    Osteoarthritis of spine with radiculopathy, lumbar region    Bleeding  -     Protime-INR; Future; Expected date: 07/24/2019      Discussed with patient that in addition to his very large herniated disc at L5-S1 which was present approximately a year ago he also has some disc bulge at L4-5 with moderate bilateral neural foraminal narrowing.   Discussed with patient that we will begin his preoperative workup since I am concerned about his other health problems and getting cleared for lumbar back surgery.   Will order CT lumbar spine and XR lumbar flexion extension   Preoperative labs will be ordered today  Preoperative clearances to be scheduled with Dr. Lambert (PCP), Eri Armstrong NP (Pulmonology), and Dr. Zhao (Cardiology)  Patient to follow up with Dr. Chandra following CT lumbar to discuss possible TLIF    Patient verbalizes understanding and agrees with the above plan.    Blakeney McKnight, PA-C [facility]

## 2019-07-24 ENCOUNTER — CLINICAL SUPPORT (OUTPATIENT)
Dept: CARDIOLOGY | Facility: CLINIC | Age: 64
End: 2019-07-24
Payer: MEDICARE

## 2019-07-24 ENCOUNTER — TELEPHONE (OUTPATIENT)
Dept: RHEUMATOLOGY | Facility: CLINIC | Age: 64
End: 2019-07-24

## 2019-07-24 ENCOUNTER — OFFICE VISIT (OUTPATIENT)
Dept: NEUROSURGERY | Facility: CLINIC | Age: 64
End: 2019-07-24
Payer: MEDICARE

## 2019-07-24 ENCOUNTER — OFFICE VISIT (OUTPATIENT)
Dept: RHEUMATOLOGY | Facility: CLINIC | Age: 64
End: 2019-07-24
Payer: MEDICARE

## 2019-07-24 ENCOUNTER — HOSPITAL ENCOUNTER (OUTPATIENT)
Dept: RADIOLOGY | Facility: HOSPITAL | Age: 64
Discharge: HOME | End: 2019-07-24
Attending: PHYSICIAN ASSISTANT
Payer: MEDICARE

## 2019-07-24 VITALS
HEART RATE: 50 BPM | HEIGHT: 65 IN | BODY MASS INDEX: 35.59 KG/M2 | SYSTOLIC BLOOD PRESSURE: 142 MMHG | DIASTOLIC BLOOD PRESSURE: 79 MMHG | WEIGHT: 213.63 LBS

## 2019-07-24 VITALS
WEIGHT: 212.94 LBS | HEART RATE: 82 BPM | SYSTOLIC BLOOD PRESSURE: 123 MMHG | BODY MASS INDEX: 35.44 KG/M2 | DIASTOLIC BLOOD PRESSURE: 78 MMHG

## 2019-07-24 DIAGNOSIS — M47.816 LUMBAR SPONDYLOSIS: ICD-10-CM

## 2019-07-24 DIAGNOSIS — M54.16 LUMBAR RADICULOPATHY: ICD-10-CM

## 2019-07-24 DIAGNOSIS — M47.26 OSTEOARTHRITIS OF SPINE WITH RADICULOPATHY, LUMBAR REGION: ICD-10-CM

## 2019-07-24 DIAGNOSIS — R06.02 SOB (SHORTNESS OF BREATH): ICD-10-CM

## 2019-07-24 DIAGNOSIS — M33.13 DERMATOMYOSITIS: Primary | ICD-10-CM

## 2019-07-24 DIAGNOSIS — R58 BLEEDING: ICD-10-CM

## 2019-07-24 DIAGNOSIS — M47.816 LUMBAR SPONDYLOSIS: Primary | ICD-10-CM

## 2019-07-24 PROCEDURE — 99999 PR PBB SHADOW E&M-EST. PATIENT-LVL V: ICD-10-PCS | Mod: PBBFAC,HCNC,, | Performed by: PHYSICIAN ASSISTANT

## 2019-07-24 PROCEDURE — 3078F PR MOST RECENT DIASTOLIC BLOOD PRESSURE < 80 MM HG: ICD-10-PCS | Mod: HCNC,CPTII,S$GLB, | Performed by: PHYSICIAN ASSISTANT

## 2019-07-24 PROCEDURE — 3074F SYST BP LT 130 MM HG: CPT | Mod: HCNC,CPTII,S$GLB, | Performed by: INTERNAL MEDICINE

## 2019-07-24 PROCEDURE — 99214 OFFICE O/P EST MOD 30 MIN: CPT | Mod: HCNC,S$GLB,, | Performed by: PHYSICIAN ASSISTANT

## 2019-07-24 PROCEDURE — 3078F PR MOST RECENT DIASTOLIC BLOOD PRESSURE < 80 MM HG: ICD-10-PCS | Mod: HCNC,CPTII,S$GLB, | Performed by: INTERNAL MEDICINE

## 2019-07-24 PROCEDURE — 3008F BODY MASS INDEX DOCD: CPT | Mod: HCNC,CPTII,S$GLB, | Performed by: INTERNAL MEDICINE

## 2019-07-24 PROCEDURE — 71046 X-RAY EXAM CHEST 2 VIEWS: CPT | Mod: TC,HCNC

## 2019-07-24 PROCEDURE — 3008F BODY MASS INDEX DOCD: CPT | Mod: HCNC,CPTII,S$GLB, | Performed by: PHYSICIAN ASSISTANT

## 2019-07-24 PROCEDURE — 3008F PR BODY MASS INDEX (BMI) DOCUMENTED: ICD-10-PCS | Mod: HCNC,CPTII,S$GLB, | Performed by: PHYSICIAN ASSISTANT

## 2019-07-24 PROCEDURE — 99214 OFFICE O/P EST MOD 30 MIN: CPT | Mod: HCNC,S$GLB,, | Performed by: INTERNAL MEDICINE

## 2019-07-24 PROCEDURE — 72120 XR LUMBAR SPINE FLEXION AND EXTENSION ONLY: ICD-10-PCS | Mod: 26,HCNC,, | Performed by: RADIOLOGY

## 2019-07-24 PROCEDURE — 93005 EKG 12-LEAD: ICD-10-PCS | Mod: HCNC,S$GLB,, | Performed by: INTERNAL MEDICINE

## 2019-07-24 PROCEDURE — 99999 PR PBB SHADOW E&M-EST. PATIENT-LVL II: ICD-10-PCS | Mod: PBBFAC,HCNC,, | Performed by: INTERNAL MEDICINE

## 2019-07-24 PROCEDURE — 93010 EKG 12-LEAD: ICD-10-PCS | Mod: HCNC,S$GLB,, | Performed by: INTERNAL MEDICINE

## 2019-07-24 PROCEDURE — 72120 X-RAY BEND ONLY L-S SPINE: CPT | Mod: 26,HCNC,, | Performed by: RADIOLOGY

## 2019-07-24 PROCEDURE — 99999 PR PBB SHADOW E&M-EST. PATIENT-LVL V: CPT | Mod: PBBFAC,HCNC,, | Performed by: PHYSICIAN ASSISTANT

## 2019-07-24 PROCEDURE — 99999 PR PBB SHADOW E&M-EST. PATIENT-LVL II: CPT | Mod: PBBFAC,HCNC,, | Performed by: INTERNAL MEDICINE

## 2019-07-24 PROCEDURE — 3074F SYST BP LT 130 MM HG: CPT | Mod: HCNC,CPTII,S$GLB, | Performed by: PHYSICIAN ASSISTANT

## 2019-07-24 PROCEDURE — 71046 X-RAY EXAM CHEST 2 VIEWS: CPT | Mod: 26,HCNC,, | Performed by: RADIOLOGY

## 2019-07-24 PROCEDURE — 93010 ELECTROCARDIOGRAM REPORT: CPT | Mod: HCNC,S$GLB,, | Performed by: INTERNAL MEDICINE

## 2019-07-24 PROCEDURE — 93005 ELECTROCARDIOGRAM TRACING: CPT | Mod: HCNC,S$GLB,, | Performed by: INTERNAL MEDICINE

## 2019-07-24 PROCEDURE — 99214 PR OFFICE/OUTPT VISIT, EST, LEVL IV, 30-39 MIN: ICD-10-PCS | Mod: HCNC,S$GLB,, | Performed by: INTERNAL MEDICINE

## 2019-07-24 PROCEDURE — 3078F DIAST BP <80 MM HG: CPT | Mod: HCNC,CPTII,S$GLB, | Performed by: PHYSICIAN ASSISTANT

## 2019-07-24 PROCEDURE — 3074F PR MOST RECENT SYSTOLIC BLOOD PRESSURE < 130 MM HG: ICD-10-PCS | Mod: HCNC,CPTII,S$GLB, | Performed by: INTERNAL MEDICINE

## 2019-07-24 PROCEDURE — 3074F PR MOST RECENT SYSTOLIC BLOOD PRESSURE < 130 MM HG: ICD-10-PCS | Mod: HCNC,CPTII,S$GLB, | Performed by: PHYSICIAN ASSISTANT

## 2019-07-24 PROCEDURE — 72120 X-RAY BEND ONLY L-S SPINE: CPT | Mod: TC,HCNC

## 2019-07-24 PROCEDURE — 99214 PR OFFICE/OUTPT VISIT, EST, LEVL IV, 30-39 MIN: ICD-10-PCS | Mod: HCNC,S$GLB,, | Performed by: PHYSICIAN ASSISTANT

## 2019-07-24 PROCEDURE — 71046 XR CHEST PA AND LATERAL PRE-OP: ICD-10-PCS | Mod: 26,HCNC,, | Performed by: RADIOLOGY

## 2019-07-24 PROCEDURE — 3078F DIAST BP <80 MM HG: CPT | Mod: HCNC,CPTII,S$GLB, | Performed by: INTERNAL MEDICINE

## 2019-07-24 PROCEDURE — 3008F PR BODY MASS INDEX (BMI) DOCUMENTED: ICD-10-PCS | Mod: HCNC,CPTII,S$GLB, | Performed by: INTERNAL MEDICINE

## 2019-07-24 RX ORDER — AZATHIOPRINE 50 MG/1
50 TABLET ORAL DAILY
Qty: 90 TABLET | Refills: 1 | Status: SHIPPED | OUTPATIENT
Start: 2019-07-24 | End: 2019-08-21 | Stop reason: SDUPTHER

## 2019-07-24 RX ORDER — PREDNISONE 10 MG/1
TABLET ORAL
Qty: 60 TABLET | Refills: 1 | Status: SHIPPED | OUTPATIENT
Start: 2019-07-24 | End: 2019-08-01

## 2019-07-24 NOTE — TELEPHONE ENCOUNTER
Called pt about missed appt, states still at other appt will be running late today-per dr. dm bonilla in

## 2019-07-24 NOTE — PATIENT INSTRUCTIONS
Azathioprine tablets  What is this medicine?  AZATHIOPRINE (ay za THYE oh preen) suppresses the immune system. It is used to prevent organ rejection after a transplant. It is also used to treat rheumatoid arthritis.  How should I use this medicine?  Take this medicine by mouth with a full glass of water. Follow the directions on the prescription label. Take your medicine at regular intervals. Do not take your medicine more often than directed. Continue to take your medicine even if you feel better. Do not stop taking except on your doctor's advice.  Talk to your pediatrician regarding the use of this medicine in children. Special care may be needed.  What side effects may I notice from receiving this medicine?  Side effects that you should report to your doctor or health care professional as soon as possible:  · allergic reactions like skin rash, itching or hives, swelling of the face, lips, or tongue  · changes in vision  · confusion  · fever, chills, or any other sign of infection  · loss of balance or coordination  · severe stomach pain  · unusual bleeding, bruising  · unusually weak or tired  · vomiting  · yellowing of the eyes or skin  Side effects that usually do not require medical attention (report to your doctor or health care professional if they continue or are bothersome):  · hair loss  · nausea  What may interact with this medicine?  Do not take this medicine with any of the following medications:  · febuxostat  · mercaptopurine  This medicine may also interact with the following medications:  · allopurinol  · aminosalicylates like sulfasalazine, mesalamine, balsalazide, and olsalazine  · leflunomide  · medicines called ACE inhibitors like benazepril, captopril, enalapril, fosinopril, quinapril, lisinopril, ramipril, and trandolapril  · mycophenolate  · sulfamethoxazole; trimethoprim  · vaccines  · warfarin  What if I miss a dose?  If you miss a dose, take it as soon as you can. If it is almost time  for your next dose, take only that dose. Do not take double or extra doses.  Where should I keep my medicine?  Keep out of the reach of children.  Store at room temperature between 15 and 25 degrees C (59 and 77 degrees F). Protect from light. Throw away any unused medicine after the expiration date.  What should I tell my health care provider before I take this medicine?  They need to know if you have any of these conditions:  · infection  · kidney disease  · liver disease  · an unusual or allergic reaction to azathioprine, other medicines, lactose, foods, dyes, or preservatives  · pregnant or trying to get pregnant  · breast feeding  What should I watch for while using this medicine?  Visit your doctor or health care professional for regular checks on your progress. You will need frequent blood checks during the first few months you are receiving the medicine.  If you get a cold or other infection while receiving this medicine, call your doctor or health care professional. Do not treat yourself. The medicine may increase your risk of getting an infection.  Women should inform their doctor if they wish to become pregnant or think they might be pregnant. There is a potential for serious side effects to an unborn child. Talk to your health care professional or pharmacist for more information.  Men may have a reduced sperm count while they are taking this medicine. Talk to your health care professional for more information.  This medicine may increase your risk of getting certain kinds of cancer. Talk to your doctor about healthy lifestyle choices, important screenings, and your risk.  NOTE:This sheet is a summary. It may not cover all possible information. If you have questions about this medicine, talk to your doctor, pharmacist, or health care provider. Copyright© 2017 [employer]

## 2019-07-24 NOTE — PROGRESS NOTES
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, with long-term current use of insulin    SANDRITA on CPAP    Asthma    Psychophysiological insomnia    Nightmares    Sleep related gastroesophageal reflux disease    Inadequate sleep hygiene    PAF (paroxysmal atrial fibrillation)    At risk for medication noncompliance    Obesity (BMI 30.0-34.9)    Indeterminate stage chronic open angle glaucoma    Elevated troponin    Right hip pain    Gait instability    Chronic right shoulder pain    Arthritis    Left sided sciatica    Dyspnea on exertion    Osteoarthritis of spine with radiculopathy, lumbar region    HNP (herniated nucleus pulposus), lumbar    Gastroesophageal reflux disease without esophagitis    Acute on chronic combined systolic and diastolic heart failure    Hypogonadism in male       Here for follow-up since initial visit  Few weeks back he was seen for dermatomyositis with diffuse rash all over    Since last visit he was started on prednisone, however he tapered it quickly and did 60 mg a day for a week followed by 40 mg a day for a week followed by 20 mg a day  Currently he is on prednisone 20 mg a day  Rash completely resolved  Malignancy workup negative including CT chest, abdomen, pelvis  PSA normal  Sumanth marker panel negative  Myositis panel pending  HIV, hep panel negative    He has history of some  shortness of breath  Nonsmoker  CT chest only somewhat mosaic  attenuation no ILD pattern      History   he was previously seen in our clinic by Dr. Jimenez  For suspected polymyalgia rheumatica/ GCA ,  But then biopsy was negative and he was tapered off steroids   in May he presented to Dermatology Clinic with a diffuse erythematous itchy rash involving  Most of the body   it involved upper arms,  Legs-  Below-knee,  Chest wall and abdominal wall,  Sparing of back   face, scalp around the eyes    it is very itchy and scaly   biopsy was done by Dermatology,  This was suggestive of interface dermatitis   most consistent with dermatomyositis   other differential MCTD versus SLE     he denies any muscle weakness,  Normal CK and aldolase   denies any fever chills, weight loss     he was ordered prednisone 60 mg a day by dermatology but he has not started yet as he did not receive the medication from mail-order pharmacy     no family history of autoimmune disease   nonsmoker   denies any chest pain or shortness of breath    Past Medical History:   Diagnosis Date    Acute kidney injury     Arthritis     Asthma     Atrial fibrillation     CHF (congestive heart failure)     Depression     Diabetes mellitus, type 2 Diagnosed in 2000    Elevated PSA     Hypertension     Sleep apnea        Past Surgical History:   Procedure Laterality Date    BIOPSY-ARTERY-TEMPORAL Left 9/1/2017    [facility] OR    CHOLECYSTECTOMY      CHOLECYSTECTOMY-LAPAROSCOPIC N/A 3/17/2017    Performed by Louis O. Jeansonne IV, MD at [facility] OR         Social History     Tobacco Use    Smoking status: Never Smoker    Smokeless tobacco: Never Used   Substance Use Topics    Alcohol use: No    Drug use: No       Family History   Problem Relation Age of Onset    Glaucoma Mother     Cataracts Mother     Cataracts Father     Glaucoma Sister     Cataracts Sister     Glaucoma Maternal Aunt     No Known Problems  Brother     No Known Problems Daughter     No Known Problems Son     Prostate cancer Neg Hx        Review of patient's allergies indicates:  No Known Allergies      Statin use:  Calcium supplements :  Vitamin d supplements :    DEXA :  Medications :  Exercise :    Review of Systems:  Constitutional: Denies fever, chills. No recent weight changes.   Fatigue: no   Muscle weakness: no  Headaches: no new headaches  Eyes: No redness or dryness.  No recent visual changes.  ENT: Denies dry mouth. No oral or nasal ulcers.  Card: No chest pain.  Resp: No cough   Gastro: No nausea or vomiting.  No heartburn.  Constipation: no  Diarrhea: no  Genito:  No dysuria.  No genital ulcers.  Skin:  Resolved  Raynauds:no  Neuro:  none  Psych: No depression, anxiety  Endo:  no excess thirst.  Heme: no abnormal bleeding or bruising  Clots:none   Miscarriages : none     OBJECTIVE:     Vital Signs   Vitals:    07/24/19 1235   BP: 123/78   Pulse: 82     Physical Exam:  General Appearance:  NAD.   Gait: not favoring.  HEENT: PERRL.  Eyes not dry or injected.  No nasal ulcers.  Mouth not dry, no oral lesions.  Lymph: cervical, supraclavicular or axillary nodes: none abnormal   Cardio: no irregularity of S1 or S2.  No gallops or rubs.   Resp: Normal respiratory motion. Clear to auscultation bilaterally.   No abnormal chest conformation.  Abd: Soft, non-tender, nondistended.  No masses.   Skin: Head and neck,  and extremities examined. +Neuro: Ox3.   Cranial nerves II-XII grossly intact.   Rash resolved    Musculoskeletal Exam:    No synovitis     L spine facet tenderness       Muscle strength:Equal and full in all mm groups of the upper and lower ext.    Laboratory:   Results for orders placed or performed in visit on 07/09/19   Basic metabolic panel   Result Value Ref Range    Sodium 139 136 - 145 mmol/L    Potassium 3.5 3.5 - 5.1 mmol/L    Chloride 102 95 - 110 mmol/L    CO2 23 23 - 29 mmol/L    Glucose 121 (H) 70 - 110 mg/dL    BUN, Bld  19 8 - 23 mg/dL    Creatinine 1.0 0.5 - 1.4 mg/dL    Calcium 9.3 8.7 - 10.5 mg/dL    Anion Gap 14 8 - 16 mmol/L    eGFR if African American >60 >60 mL/min/1.73 m^2    eGFR if non African American >60 >60 mL/min/1.73 m^2     Imaging :  CT chest abdomen pelvis    Impression       1. No evidence to suggest malignancy.  2. Diffuse mosaic attenuation of the lung parenchyma bilaterally which appears more prominent when compared to the CT from 03/15/2017.  3. Remaining findings as discussed above and stable when compared to prior studies.         Notes reviewed  Other procedures:  Pathology skin reviewed    ASSESSMENT/PLAN:     Dermatomyositis  -     azaTHIOprine (IMURAN) 50 mg Tab; Take 1 tablet (50 mg total) by mouth once daily.  Dispense: 90 tablet; Refill: 1  -     predniSONE (DELTASONE) 10 MG tablet; 15 mg / day x 2 weeks then 10 mg / day  Dispense: 60 tablet; Refill: 1  -     Complete PFT with bronchodilator; Future    SOB (shortness of breath)  -     Complete PFT with bronchodilator; Future    1: dermatomyositis sine myositis    no evidence of muscular involvement,  Normal CK and aldolase  Rash resolved  Continue with prednisone 20 mg a day for 2 weeks followed by 15 mg a day for 2 weeks  Then stay at 10 mg a day     malignancy screening- had a normal colonoscopy 1 year back per him   PSA- normal   CT chest abdomen and pelvis- no evidence of any malignancy    TPMT normal  Start Imuran 50 mg a day, check CBC in 2 weeks, if normal he is aware will increase to 50 mg twice a day    2:  Shortness of breath, intermittent:  No ILD changes noted on CT chest  Check PFTs  Patient is a nonsmoker    3:  Diabetes mellitus type 2:  Watch blood sugars closely while on prednisone    4:  History of AFib:  Hold if any palpitations noted in notify us immediately     4 week return with all 4    Risks vs Benefits and potential side effects of medication prescribed today were discussed with patient. Medication literature given to patient  up discharge  Went over uptodate information /literature on the meds prescribed today       steroids-  Weight gain, uncontrolled blood sugars, cataracts,  palpitations discussed  Imuran- infection, malignancy, lymphoma risk discussed  Always hold for infections or prior to surgery   please notify me if any major side effects noted on steroids  Patient advised to hold DMARD and/or biologic therapy for signs of infection or for surgery. If you are unsure what to do please call our office for instruction. [facility] [phone]        Disclaimer: This note was prepared using voice recognition system and is likely to have sound alike errors and is not proof read.  Please call me with any questions.

## 2019-07-25 ENCOUNTER — TELEPHONE (OUTPATIENT)
Dept: PHARMACY | Facility: CLINIC | Age: 64
End: 2019-07-25

## 2019-07-25 NOTE — TELEPHONE ENCOUNTER
Informed Patient  that [facility] received prescription for Azathioprine & Prednisone and benefits investigation is required.  [facility] will be back in touch once insurance determination is received.

## 2019-07-29 ENCOUNTER — TELEPHONE (OUTPATIENT)
Dept: INTERNAL MEDICINE | Facility: CLINIC | Age: 64
End: 2019-07-29

## 2019-07-29 ENCOUNTER — HOSPITAL ENCOUNTER (OUTPATIENT)
Dept: RADIOLOGY | Facility: HOSPITAL | Age: 64
Discharge: HOME | End: 2019-07-29
Attending: PHYSICIAN ASSISTANT
Payer: MEDICARE

## 2019-07-29 ENCOUNTER — OFFICE VISIT (OUTPATIENT)
Dept: INTERNAL MEDICINE | Facility: CLINIC | Age: 64
End: 2019-07-29
Payer: MEDICARE

## 2019-07-29 VITALS
WEIGHT: 202.38 LBS | TEMPERATURE: 98 F | SYSTOLIC BLOOD PRESSURE: 141 MMHG | BODY MASS INDEX: 33.72 KG/M2 | OXYGEN SATURATION: 98 % | HEART RATE: 76 BPM | DIASTOLIC BLOOD PRESSURE: 81 MMHG | HEIGHT: 65 IN | RESPIRATION RATE: 19 BRPM

## 2019-07-29 DIAGNOSIS — I15.2 HYPERTENSION ASSOCIATED WITH DIABETES: Primary | ICD-10-CM

## 2019-07-29 DIAGNOSIS — J45.909 MODERATE ASTHMA: ICD-10-CM

## 2019-07-29 DIAGNOSIS — M47.816 LUMBAR SPONDYLOSIS: ICD-10-CM

## 2019-07-29 DIAGNOSIS — E11.65 TYPE 2 DIABETES MELLITUS WITH HYPERGLYCEMIA, WITH LONG-TERM CURRENT USE OF INSULIN: ICD-10-CM

## 2019-07-29 DIAGNOSIS — J45.40 MODERATE PERSISTENT ASTHMA WITHOUT COMPLICATION: ICD-10-CM

## 2019-07-29 DIAGNOSIS — Z79.4 TYPE 2 DIABETES MELLITUS WITH HYPERGLYCEMIA, WITH LONG-TERM CURRENT USE OF INSULIN: ICD-10-CM

## 2019-07-29 DIAGNOSIS — E11.59 HYPERTENSION ASSOCIATED WITH DIABETES: Primary | ICD-10-CM

## 2019-07-29 DIAGNOSIS — E11.69 HYPERLIPIDEMIA ASSOCIATED WITH TYPE 2 DIABETES MELLITUS: ICD-10-CM

## 2019-07-29 DIAGNOSIS — M51.26 HNP (HERNIATED NUCLEUS PULPOSUS), LUMBAR: ICD-10-CM

## 2019-07-29 DIAGNOSIS — E78.5 HYPERLIPIDEMIA ASSOCIATED WITH TYPE 2 DIABETES MELLITUS: ICD-10-CM

## 2019-07-29 DIAGNOSIS — M47.26 OSTEOARTHRITIS OF SPINE WITH RADICULOPATHY, LUMBAR REGION: ICD-10-CM

## 2019-07-29 DIAGNOSIS — E21.5 DISORDER OF PARATHYROID GLAND: ICD-10-CM

## 2019-07-29 PROCEDURE — 72131 CT LUMBAR SPINE WITHOUT CONTRAST: ICD-10-PCS | Mod: 26,HCNC,, | Performed by: RADIOLOGY

## 2019-07-29 PROCEDURE — 3046F HEMOGLOBIN A1C LEVEL >9.0%: CPT | Mod: HCNC,CPTII,S$GLB, | Performed by: FAMILY MEDICINE

## 2019-07-29 PROCEDURE — 99999 PR PBB SHADOW E&M-EST. PATIENT-LVL III: CPT | Mod: PBBFAC,HCNC,, | Performed by: FAMILY MEDICINE

## 2019-07-29 PROCEDURE — 72131 CT LUMBAR SPINE W/O DYE: CPT | Mod: TC,HCNC

## 2019-07-29 PROCEDURE — 99214 PR OFFICE/OUTPT VISIT, EST, LEVL IV, 30-39 MIN: ICD-10-PCS | Mod: HCNC,S$GLB,, | Performed by: FAMILY MEDICINE

## 2019-07-29 PROCEDURE — 3077F PR MOST RECENT SYSTOLIC BLOOD PRESSURE >= 140 MM HG: ICD-10-PCS | Mod: HCNC,CPTII,S$GLB, | Performed by: FAMILY MEDICINE

## 2019-07-29 PROCEDURE — 3077F SYST BP >= 140 MM HG: CPT | Mod: HCNC,CPTII,S$GLB, | Performed by: FAMILY MEDICINE

## 2019-07-29 PROCEDURE — 3008F BODY MASS INDEX DOCD: CPT | Mod: HCNC,CPTII,S$GLB, | Performed by: FAMILY MEDICINE

## 2019-07-29 PROCEDURE — 3008F PR BODY MASS INDEX (BMI) DOCUMENTED: ICD-10-PCS | Mod: HCNC,CPTII,S$GLB, | Performed by: FAMILY MEDICINE

## 2019-07-29 PROCEDURE — 99999 PR PBB SHADOW E&M-EST. PATIENT-LVL III: ICD-10-PCS | Mod: PBBFAC,HCNC,, | Performed by: FAMILY MEDICINE

## 2019-07-29 PROCEDURE — 99214 OFFICE O/P EST MOD 30 MIN: CPT | Mod: HCNC,S$GLB,, | Performed by: FAMILY MEDICINE

## 2019-07-29 PROCEDURE — 72131 CT LUMBAR SPINE W/O DYE: CPT | Mod: 26,HCNC,, | Performed by: RADIOLOGY

## 2019-07-29 PROCEDURE — 3079F PR MOST RECENT DIASTOLIC BLOOD PRESSURE 80-89 MM HG: ICD-10-PCS | Mod: HCNC,CPTII,S$GLB, | Performed by: FAMILY MEDICINE

## 2019-07-29 PROCEDURE — 3046F PR MOST RECENT HEMOGLOBIN A1C LEVEL > 9.0%: ICD-10-PCS | Mod: HCNC,CPTII,S$GLB, | Performed by: FAMILY MEDICINE

## 2019-07-29 PROCEDURE — 3079F DIAST BP 80-89 MM HG: CPT | Mod: HCNC,CPTII,S$GLB, | Performed by: FAMILY MEDICINE

## 2019-07-29 RX ORDER — ALBUTEROL SULFATE 90 UG/1
2 AEROSOL, METERED RESPIRATORY (INHALATION) EVERY 4 HOURS PRN
Qty: 3 INHALER | Refills: 0 | Status: ON HOLD | OUTPATIENT
Start: 2019-07-29 | End: 2019-08-08 | Stop reason: HOSPADM

## 2019-07-29 RX ORDER — GABAPENTIN 800 MG/1
800 TABLET ORAL 3 TIMES DAILY
Qty: 270 TABLET | Refills: 0 | Status: SHIPPED | OUTPATIENT
Start: 2019-07-29 | End: 2019-10-15 | Stop reason: SDUPTHER

## 2019-07-29 RX ORDER — AMLODIPINE BESYLATE 10 MG/1
TABLET ORAL
Qty: 90 TABLET | Refills: 0 | Status: SHIPPED | OUTPATIENT
Start: 2019-07-29 | End: 2019-08-15 | Stop reason: ALTCHOICE

## 2019-07-29 RX ORDER — TIOTROPIUM BROMIDE 18 UG/1
18 CAPSULE ORAL; RESPIRATORY (INHALATION) DAILY
Qty: 90 CAPSULE | Refills: 0 | Status: SHIPPED | OUTPATIENT
Start: 2019-07-29 | End: 2020-03-23 | Stop reason: SDUPTHER

## 2019-07-29 RX ORDER — FUROSEMIDE 40 MG/1
TABLET ORAL
Qty: 270 TABLET | Refills: 0 | Status: SHIPPED | OUTPATIENT
Start: 2019-07-29 | End: 2020-01-16

## 2019-07-29 RX ORDER — ATORVASTATIN CALCIUM 40 MG/1
40 TABLET, FILM COATED ORAL NIGHTLY
Qty: 90 TABLET | Refills: 0 | Status: SHIPPED | OUTPATIENT
Start: 2019-07-29 | End: 2019-10-17 | Stop reason: SDUPTHER

## 2019-07-29 NOTE — PROGRESS NOTES
Subjective:       Patient ID: Crow Green is a 64 y.o. male.    Chief Complaint: Follow-up (1 month)    Hypertension   This is a chronic problem. The current episode started more than 1 year ago. The problem has been gradually improving since onset. The problem is uncontrolled. Pertinent negatives include no anxiety, blurred vision, chest pain, headaches or shortness of breath. There are no associated agents to hypertension. Risk factors for coronary artery disease include diabetes mellitus, dyslipidemia, male gender and obesity. Past treatments include calcium channel blockers and ACE inhibitors. There are no compliance problems.      Review of Systems   Eyes: Negative for blurred vision.   Respiratory: Negative for shortness of breath.    Cardiovascular: Negative for chest pain.   Gastrointestinal: Negative for abdominal pain.   Neurological: Negative for headaches.       Objective:      Physical Exam   Constitutional: He appears well-developed and well-nourished. No distress.   Using walker   HENT:   Head: Normocephalic and atraumatic.   Pulmonary/Chest: Effort normal and breath sounds normal. No respiratory distress. He has no wheezes.   Abdominal: Soft. Bowel sounds are normal. He exhibits no distension. There is no tenderness.   Skin: Skin is warm and dry. No rash noted. He is not diaphoretic. No erythema.   Nursing note and vitals reviewed.      Assessment:       1. Hypertension associated with diabetes    2. Disorder of parathyroid gland    3. Hyperlipidemia associated with type 2 diabetes mellitus    4. Osteoarthritis of spine with radiculopathy, lumbar region    5. HNP (herniated nucleus pulposus), lumbar    6. Moderate persistent asthma without complication    7. Moderate asthma    8. Type 2 diabetes mellitus with hyperglycemia, with long-term current use of insulin    9. 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 In studies with rodents, Ozempic® and medicines that work like Ozempic® caused thyroid tumors, including thyroid cancer. It is not known if Ozempic® will cause thyroid tumors or a type of thyroid cancer called medullary thyroid carcinoma (MTC) in people.  Discussed with pt that if they have a known history of MEN2, pancreatitis, history, or retinopathy that they would not be a good candidate. Pt has verbalized that they do not have any previous history as described above and are willing to start this medication.  Pt also understands that this medication may cause Gallbladder disease or hypersensitivity to Ozempic.           Relevant Medications    semaglutide (OZEMPIC) 0.25 mg or 0.5 mg(2 mg/1.5 mL) PnIj       Pulmonary    Moderate asthma    Relevant Medications    VENTOLIN HFA 90 mcg/actuation inhaler    tiotropium (SPIRIVA WITH HANDIHALER) 18 mcg inhalation capsule       Cardiac/Vascular    Hypertension associated with diabetes - Primary    Relevant Medications    amLODIPine (NORVASC) 10 MG tablet    semaglutide (OZEMPIC) 0.25 mg or 0.5 mg(2 mg/1.5 mL) PnIj    Hyperlipidemia associated with type 2 diabetes mellitus    Relevant Medications    atorvastatin (LIPITOR) 40 MG tablet    semaglutide (OZEMPIC) 0.25 mg or 0.5 mg(2 mg/1.5 mL) PnIj       Endocrine     Disorder of parathyroid gland    Relevant Orders    PTH, intact    RESOLVED: Type 2 diabetes mellitus with hyperglycemia    Relevant Medications    semaglutide (OZEMPIC) 0.25 mg or 0.5 mg(2 mg/1.5 mL) PnIj

## 2019-07-29 NOTE — TELEPHONE ENCOUNTER
----- Message from Awilda Jamison sent at 7/29/2019 12:14 PM CDT -----  Contact: self [phone]  States that he found some more medication and would like a call back at [phone]//thank you acc

## 2019-07-30 ENCOUNTER — OFFICE VISIT (OUTPATIENT)
Dept: NEUROSURGERY | Facility: CLINIC | Age: 64
End: 2019-07-30
Payer: MEDICARE

## 2019-07-30 VITALS
DIASTOLIC BLOOD PRESSURE: 75 MMHG | BODY MASS INDEX: 33.72 KG/M2 | WEIGHT: 202.38 LBS | SYSTOLIC BLOOD PRESSURE: 109 MMHG | HEART RATE: 85 BPM | HEIGHT: 65 IN | RESPIRATION RATE: 20 BRPM

## 2019-07-30 DIAGNOSIS — M47.816 LUMBAR SPONDYLOSIS: ICD-10-CM

## 2019-07-30 DIAGNOSIS — M51.36 DEGENERATIVE DISC DISEASE, LUMBAR: Primary | ICD-10-CM

## 2019-07-30 DIAGNOSIS — M25.551 RIGHT HIP PAIN: ICD-10-CM

## 2019-07-30 PROCEDURE — 99214 PR OFFICE/OUTPT VISIT, EST, LEVL IV, 30-39 MIN: ICD-10-PCS | Mod: HCNC,S$GLB,, | Performed by: NEUROLOGICAL SURGERY

## 2019-07-30 PROCEDURE — 3008F BODY MASS INDEX DOCD: CPT | Mod: HCNC,CPTII,S$GLB, | Performed by: NEUROLOGICAL SURGERY

## 2019-07-30 PROCEDURE — 99999 PR PBB SHADOW E&M-EST. PATIENT-LVL IV: ICD-10-PCS | Mod: PBBFAC,HCNC,, | Performed by: NEUROLOGICAL SURGERY

## 2019-07-30 PROCEDURE — 3008F PR BODY MASS INDEX (BMI) DOCUMENTED: ICD-10-PCS | Mod: HCNC,CPTII,S$GLB, | Performed by: NEUROLOGICAL SURGERY

## 2019-07-30 PROCEDURE — 3074F PR MOST RECENT SYSTOLIC BLOOD PRESSURE < 130 MM HG: ICD-10-PCS | Mod: HCNC,CPTII,S$GLB, | Performed by: NEUROLOGICAL SURGERY

## 2019-07-30 PROCEDURE — 99214 OFFICE O/P EST MOD 30 MIN: CPT | Mod: HCNC,S$GLB,, | Performed by: NEUROLOGICAL SURGERY

## 2019-07-30 PROCEDURE — 3078F PR MOST RECENT DIASTOLIC BLOOD PRESSURE < 80 MM HG: ICD-10-PCS | Mod: HCNC,CPTII,S$GLB, | Performed by: NEUROLOGICAL SURGERY

## 2019-07-30 PROCEDURE — 99999 PR PBB SHADOW E&M-EST. PATIENT-LVL IV: CPT | Mod: PBBFAC,HCNC,, | Performed by: NEUROLOGICAL SURGERY

## 2019-07-30 PROCEDURE — 3078F DIAST BP <80 MM HG: CPT | Mod: HCNC,CPTII,S$GLB, | Performed by: NEUROLOGICAL SURGERY

## 2019-07-30 PROCEDURE — 3074F SYST BP LT 130 MM HG: CPT | Mod: HCNC,CPTII,S$GLB, | Performed by: NEUROLOGICAL SURGERY

## 2019-07-30 NOTE — PROGRESS NOTES
Subjective:      Patient ID: Crow Green is a 64 y.o. male.    Chief Complaint: Lumbar Spine Pain (L-Spine) (Lumbar spondylosis, discuss MRI + x-ray's )    Patient is here today for follow-up with a CT scan of the lumbar spine for my review.    Since his last visit his symptoms have changed he has lower back pain as before however it is only going into the hips.  He has no lower extremity symptoms at this time.  He states that the right hip is worse than left.  He continues to use a rolling walker for support.  He was scheduled for an injection however he cancel the appointment.  Today he states the pain is 7/10 in severity.  No radiation down the legs. no weakness.  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   HENT: Negative for hearing loss, sore throat and tinnitus.    Eyes: Negative for pain, discharge and itching.   Cardiovascular: Negative for chest pain.   Gastrointestinal: Negative for abdominal pain.   Endocrine: Negative for cold intolerance and heat intolerance.   Genitourinary: Negative for difficulty urinating and dysuria.   Musculoskeletal: Positive for arthralgias, back pain and gait problem.   Allergic/Immunologic: Negative for environmental allergies.   Neurological: Negative for dizziness, tremors, light-headedness and headaches.   Hematological: Negative for adenopathy.   Psychiatric/Behavioral: Negative for agitation, behavioral problems and confusion.         Objective:       Neurosurgery Physical Exam  Ortho/SPM Exam    Nursing note and vitals reviewed  Gen:Oriented to person, place, and time.             Appears stated age   Skin: no rashes or lesions identified   Head:Normocephalic and atraumatic.  Nose: Nose normal.    Eyes: EOM are normal. Pupils are equal, round, and reactive to  light.   Neck: Neck supple. No masses or lesions palpated  Cardiovascular: Intact distal pulses.    Abdominal: Soft.   Neurological: Alert and oriented to person, place, and time.  No cranial nerve deficit.  Coordination normal. Normal muscle tone  Psychiatric: Normal mood and affect. Behavior is normal.      Back:        Spasms posteriorly Paraspinal muscle spasms   None  Pain with flexion and extention   WNL  Range of motion    Neg  Straight leg raise     Motor   Right Right Left Left  Level Group   5  5  L2 Hip flexor (Psoas)   5  5  L3 Leg extension (Quads)   5  5  L4 Dorsiflexion & foot inversion (Tibialis Anterior)   5  5  L5 Great toe extension ( EHL)   5  5  S1 Foot eversion (Gastroc, PL & PB)     Sensation  NL Decreased (R/L/BL) Level Sensation    X  L2 Anterio-medial thigh   X  L3 Medial thigh around knee   X  L4 Medial foot   X  L5 Dorsum foot   X  S1 Lateral foot     Reflex  2+  Patellar tendon (L4)   2+  Achilles tendon (S1)       CT scan lumbar  1. Degenerative changes of the lumbar spine as detailed above and greatest at the L5-S1 level and similar in appearance to an MRI from 03/06/2018.    Assessment:     1. Degenerative disc disease, lumbar    2. Lumbar spondylosis    3. Right hip pain      Plan:     Degenerative disc disease, lumbar    Lumbar spondylosis    Right hip pain     At this time patient is no longer complaining of left radiculopathy he is complaining of back pain and pain into the hips bilaterally with the right worse than left.  He still ambulating with a rolling walker.  I do not think his symptoms are coming from the left-sided herniated disc at L4-5 and 5 1.  If his symptoms change in a begins having the left-sided sciatica we have potentially work him up further for possible surgical intervention.  However at this time he would not want to pursue that as he has just started a new job and would not like to take time off for surgery.  I will make his appointment for follow-up p.r.n.  if his symptoms return I would refer him to pain management for injection    Thank you for the referral   Please call with any questions    Eamon Chandra MD  Neurosurgery

## 2019-07-31 ENCOUNTER — OUTPATIENT CASE MANAGEMENT (OUTPATIENT)
Dept: ADMINISTRATIVE | Facility: OTHER | Age: 64
End: 2019-07-31

## 2019-07-31 ENCOUNTER — TELEPHONE (OUTPATIENT)
Dept: RHEUMATOLOGY | Facility: CLINIC | Age: 64
End: 2019-07-31

## 2019-07-31 DIAGNOSIS — M33.13 DERMATOMYOSITIS: Primary | ICD-10-CM

## 2019-07-31 NOTE — PROGRESS NOTES
Summary: Patient called back. He stated that his phone lost power and he could not complete his conversation with [employer], he is awaiting a call back from the [employer] representative since his phone has charged some. He stated that he has dealt with this situation before. He stated that he is having stress related blood pressure due to the situation. He stated that he did attend IOP today, stated that it is helpful. Patient stated that he continues to not receive [employer] Well Dine Meals.     Interventions: Provided supportive counseling. Collaborate with OPCM RN regarding the status of Well Dine Meals. Prescription faxed to PCP, in basket message sent to PCP.     Plan: Follow up in one week.     Todays OPCM Self-Management Care Plan was developed with the patients/caregivers input and was based on identified barriers from todays assessment.  Goals were written today with the patient/caregiver and the patient has agreed to work towards these goals to improve his/her overall well-being. Patient verbalized understanding of the care plan, goals, and all of today's instructions. Encouraged patient/caregiver to communicate with his/her physician and health care team about health conditions and the treatment plan.  Provided my contact information today and encouraged patient/caregiver to call me with any questions as needed.

## 2019-07-31 NOTE — TELEPHONE ENCOUNTER
----- Message from Jane Castelan sent at 7/31/2019  1:19 PM CDT -----  Contact: Pt   Pt is calling .Type:  Needs Medical Advice    Who Called:  Pt   Symptoms (please be specific): itching and peeling hands   How long has patient had these symptoms:   Recurring   Pharmacy name and phone #:   .  [facility] #[id number] - [facility] [place] - [address] AT [address] [facility]  [address] [place] [address]  Phone: [phone] Fax: [phone]  Would the patient rather a call back or a response via [facility]? Call back   Best Call Back Number:  [phone]         .Thank You  Susana Castelan

## 2019-07-31 NOTE — PROGRESS NOTES
Summary: Phoned patient to update plan of care. Patient stated that he is not doing well. He stated that he is upset about getting a bill from [employer]. He was with a representative on another line. He said that he would call right back.     Interventions: Initiated update.     Plan: Follow up next week if no call back from patient.     Todays OPCM Self-Management Care Plan was developed with the patients/caregivers input and was based on identified barriers from todays assessment.  Goals were written today with the patient/caregiver and the patient has agreed to work towards these goals to improve his/her overall well-being. Patient verbalized understanding of the care plan, goals, and all of today's instructions. Encouraged patient/caregiver to communicate with his/her physician and health care team about health conditions and the treatment plan.  Provided my contact information today and encouraged patient/caregiver to call me with any questions as needed.

## 2019-07-31 NOTE — TELEPHONE ENCOUNTER
Both arms and hands are in pain at a level 10 and the itching/peeling of skin has happened two days after office visit. Pt states he was told to call office if it happened again.  Please advise

## 2019-08-01 ENCOUNTER — TELEPHONE (OUTPATIENT)
Dept: INTERNAL MEDICINE | Facility: CLINIC | Age: 64
End: 2019-08-01

## 2019-08-01 RX ORDER — PREDNISONE 20 MG/1
20 TABLET ORAL DAILY
Qty: 50 TABLET | Refills: 0 | Status: SHIPPED | OUTPATIENT
Start: 2019-08-01 | End: 2019-08-21 | Stop reason: SDUPTHER

## 2019-08-01 NOTE — TELEPHONE ENCOUNTER
Call the patient  He ran out of prednisone and Last he was on 20 mg a day a week ago, has not been on any medications since then    So he noted a flare  Has not received Imuran so far    I have sent a script for prednisone 20 mg a day to [facility], he will started today  If any more issues noted will let me know immediately    Please bring him in next week for follow-up

## 2019-08-01 NOTE — TELEPHONE ENCOUNTER
----- Message from Aixa Smiley sent at 8/1/2019  4:07 PM CDT -----  Contact: MANJINDER ESCALERA/SHAYLEE THOMPSON  CALLING REGARDING IF PATIENT SHOULD BE TAKING ELOQUIS OR POTASSIUM. PATIENT STATES HE IS NOT SUPPOSE TO TAKE IT.NURSE WANT TO VERIFY. PLEASE CALL MANJINDER @ [phone]. THANKS. GEMINI

## 2019-08-02 ENCOUNTER — TELEPHONE (OUTPATIENT)
Dept: RHEUMATOLOGY | Facility: CLINIC | Age: 64
End: 2019-08-02

## 2019-08-02 NOTE — TELEPHONE ENCOUNTER
----- Message from Peggy Ng sent at 8/2/2019  1:22 PM CDT -----  There was an issue with a test ordered on this patient from 06/27/2019. Please call the [facility] lab as soon as possible at [phone] ext. [phone] for complete details.  Anyone in the [facility] lab will be able to assist you with further information.

## 2019-08-05 NOTE — TELEPHONE ENCOUNTER
Called pt to schedule follow up appointment. Pt states he can only come on Tuesday or Wednesday. There were no available appointments but pt has an appointment scheduled for 8.21.19. PT states he will keep that appointment.

## 2019-08-06 ENCOUNTER — OUTPATIENT CASE MANAGEMENT (OUTPATIENT)
Dept: ADMINISTRATIVE | Facility: OTHER | Age: 64
End: 2019-08-06

## 2019-08-06 ENCOUNTER — OFFICE VISIT (OUTPATIENT)
Dept: DERMATOLOGY | Facility: CLINIC | Age: 64
End: 2019-08-06
Payer: MEDICARE

## 2019-08-06 DIAGNOSIS — E11.3299 TYPE 2 DIABETES MELLITUS WITH MILD NONPROLIFERATIVE RETINOPATHY, WITH LONG-TERM CURRENT USE OF INSULIN, MACULAR EDEMA PRESENCE UNSPECIFIED, UNSPECIFIED LATERALITY: ICD-10-CM

## 2019-08-06 DIAGNOSIS — M33.13 DERMATOMYOSITIS: Primary | ICD-10-CM

## 2019-08-06 DIAGNOSIS — Z79.4 TYPE 2 DIABETES MELLITUS WITH MILD NONPROLIFERATIVE RETINOPATHY, WITH LONG-TERM CURRENT USE OF INSULIN, MACULAR EDEMA PRESENCE UNSPECIFIED, UNSPECIFIED LATERALITY: ICD-10-CM

## 2019-08-06 PROCEDURE — 99999 PR PBB SHADOW E&M-EST. PATIENT-LVL II: CPT | Mod: PBBFAC,HCNC,, | Performed by: STUDENT IN AN ORGANIZED HEALTH CARE EDUCATION/TRAINING PROGRAM

## 2019-08-06 PROCEDURE — 99999 PR PBB SHADOW E&M-EST. PATIENT-LVL II: ICD-10-PCS | Mod: PBBFAC,HCNC,, | Performed by: STUDENT IN AN ORGANIZED HEALTH CARE EDUCATION/TRAINING PROGRAM

## 2019-08-06 PROCEDURE — 99213 OFFICE O/P EST LOW 20 MIN: CPT | Mod: HCNC,S$GLB,, | Performed by: STUDENT IN AN ORGANIZED HEALTH CARE EDUCATION/TRAINING PROGRAM

## 2019-08-06 PROCEDURE — 99213 PR OFFICE/OUTPT VISIT, EST, LEVL III, 20-29 MIN: ICD-10-PCS | Mod: HCNC,S$GLB,, | Performed by: STUDENT IN AN ORGANIZED HEALTH CARE EDUCATION/TRAINING PROGRAM

## 2019-08-06 RX ORDER — PEN NEEDLE, DIABETIC 30 GX3/16"
1 NEEDLE, DISPOSABLE MISCELLANEOUS 3 TIMES DAILY
Qty: 100 EACH | Refills: 11 | Status: SHIPPED | OUTPATIENT
Start: 2019-08-06 | End: 2020-03-23 | Stop reason: SDUPTHER

## 2019-08-06 NOTE — PROGRESS NOTES
Subjective:       Patient ID:  Crow Green is a 64 y.o. male who presents for   Chief Complaint   Patient presents with    Skin Check     History of Present Illness: The patient presents for follow-up of dermatomyositis. Last seen on 6/25/19. At that time, he was nearly erythrodermic and having diffuse, full body rash and desquamation, and subsequently, he was started on high dose prednsione taper. He reports that his symptoms significantly improved, and was evaluated by Rheumatology. By Rheumatology, he was also to start Imuran, though the patient is unsure if he is taking this medication or not. He recently was tapered off prednisone, and noticed having a flare of symptoms with his rash on the arms, and was placed back on prednisone taper. He reports that his rash is improving once again. Still continues to have subjective muscle aches and weakness. Malignancy workup has been negative.       Review of Systems   Skin: Positive for rash.        Objective:    Physical Exam   Constitutional: He appears well-developed and well-nourished. No distress.   Neurological: He is alert and oriented to person, place, and time. He is not disoriented.   Psychiatric: He has a normal mood and affect.   Skin:   Areas Examined (abnormalities noted in diagram):   Head / Face Inspection Performed  Neck Inspection Performed  Chest / Axilla Inspection Performed  Abdomen Inspection Performed  Back Inspection Performed  RUE Inspected  LUE Inspection Performed              Diagram Legend     Erythematous scaling macule/papule c/w actinic keratosis       Vascular papule c/w angioma      Pigmented verrucoid papule/plaque c/w seborrheic keratosis      Yellow umbilicated papule c/w sebaceous hyperplasia      Irregularly shaped tan macule c/w lentigo     1-2 mm smooth white papules consistent with Milia      Movable subcutaneous cyst with punctum c/w epidermal inclusion cyst      Subcutaneous movable cyst c/w pilar cyst      Firm  pink to brown papule c/w dermatofibroma      Pedunculated fleshy papule(s) c/w skin tag(s)      Evenly pigmented macule c/w junctional nevus     Mildly variegated pigmented, slightly irregular-bordered macule c/w mildly atypical nevus      Flesh colored to evenly pigmented papule c/w intradermal nevus       Pink pearly papule/plaque c/w basal cell carcinoma      Erythematous hyperkeratotic cursted plaque c/w SCC      Surgical scar with no sign of skin cancer recurrence      Open and closed comedones      Inflammatory papules and pustules      Verrucoid papule consistent consistent with wart     Erythematous eczematous patches and plaques     Dystrophic onycholytic nail with subungual debris c/w onychomycosis     Umbilicated papule    Erythematous-base heme-crusted tan verrucoid plaque consistent with inflamed seborrheic keratosis     Erythematous Silvery Scaling Plaque c/w Psoriasis     See annotation      Assessment / Plan:        Dermatomyositis - biopsy demonstrated, good response to prednisone, with recent flare after attempt to taper off medication; patient currently back on treatment.     Patient will need a steroid sparing medication, which he was prescribed Imuran by Rheumatology, though unsure if he has taken. Pt to follow-up with Rheum as scheduled. Continue gentle skin care regimen and prednisone until Rheumatology visit.              Follow up in about 3 months (around 11/6/2019).

## 2019-08-06 NOTE — TELEPHONE ENCOUNTER
----- Message from Pete Rendon sent at 8/6/2019 10:34 AM CDT -----  Contact: [phone]  .Type:  RX Refill Request    Who Called: Crow Cabralmillion  Refill or New Rx:refill  RX Name and Strength:Lancet needles   How is the patient currently taking it? (ex. 1XDay):6xday  Is this a 30 day or 90 day RX:90day  Preferred Pharmacy with phone number:.  [facility] #[id number] - [place] - [address] [facility]  [address] [facility] [address]  Phone: [phone] Fax: [phone]      Local or Mail Order:local  Ordering Provider:Dr. Lambert  Would the patient rather a call back or a response via [facility]? Call back  Best Call Back Number:[phone]  Additional Information:       .Type:  RX Refill Request    Who Called: Crow Cabralmillion  Refill or New Rx:refill  RX Name and Strength:Insulin Needles  How is the patient currently taking it? (ex. 1XDay):4xday  Is this a 30 day or 90 day RX:90day   Preferred Pharmacy with phone number:.    [facility] #[id number] - [place] - [address] [facility]  [address] [address]  Phone: [phone] Fax: [phone]    Local or Mail Order:local  Ordering Provider:  Would the patient rather a call back or a response via [facility]? Call back   Best Call Back Number:[phone]  Additional Information:

## 2019-08-07 ENCOUNTER — OUTPATIENT CASE MANAGEMENT (OUTPATIENT)
Dept: ADMINISTRATIVE | Facility: OTHER | Age: 64
End: 2019-08-07

## 2019-08-07 ENCOUNTER — HOSPITAL ENCOUNTER (OUTPATIENT)
Facility: HOSPITAL | Age: 64
Discharge: HOME | End: 2019-08-08
Attending: EMERGENCY MEDICINE | Admitting: INTERNAL MEDICINE
Payer: MEDICARE

## 2019-08-07 ENCOUNTER — CLINICAL SUPPORT (OUTPATIENT)
Dept: PULMONOLOGY | Facility: CLINIC | Age: 64
End: 2019-08-07
Payer: MEDICARE

## 2019-08-07 DIAGNOSIS — I48.91 ATRIAL FIBRILLATION WITH RVR: ICD-10-CM

## 2019-08-07 DIAGNOSIS — I48.91 ATRIAL FIBRILLATION: ICD-10-CM

## 2019-08-07 DIAGNOSIS — E16.2 HYPOGLYCEMIA: ICD-10-CM

## 2019-08-07 DIAGNOSIS — R06.02 SOB (SHORTNESS OF BREATH): ICD-10-CM

## 2019-08-07 DIAGNOSIS — I48.91 A-FIB: ICD-10-CM

## 2019-08-07 DIAGNOSIS — M33.13 DERMATOMYOSITIS: ICD-10-CM

## 2019-08-07 DIAGNOSIS — R07.9 CHEST PAIN: Primary | ICD-10-CM

## 2019-08-07 PROBLEM — N17.9 AKI (ACUTE KIDNEY INJURY): Status: ACTIVE | Noted: 2019-08-07

## 2019-08-07 PROBLEM — D72.829 LEUKOCYTOSIS: Status: ACTIVE | Noted: 2019-08-07

## 2019-08-07 LAB
ALBUMIN SERPL BCP-MCNC: 3.5 G/DL (ref 3.5–5.2)
ALP SERPL-CCNC: 61 U/L (ref 55–135)
ALT SERPL W/O P-5'-P-CCNC: 25 U/L (ref 10–44)
ANION GAP SERPL CALC-SCNC: 10 MMOL/L (ref 8–16)
ANISOCYTOSIS BLD QL SMEAR: SLIGHT
AST SERPL-CCNC: 17 U/L (ref 10–40)
BASOPHILS # BLD AUTO: 0.01 K/UL (ref 0–0.2)
BASOPHILS NFR BLD: 0.1 % (ref 0–1.9)
BILIRUB SERPL-MCNC: 0.5 MG/DL (ref 0.1–1)
BNP SERPL-MCNC: 137 PG/ML (ref 0–99)
BRPFT: ABNORMAL
BUN SERPL-MCNC: 37 MG/DL (ref 8–23)
CALCIUM SERPL-MCNC: 9.5 MG/DL (ref 8.7–10.5)
CHLORIDE SERPL-SCNC: 103 MMOL/L (ref 95–110)
CO2 SERPL-SCNC: 27 MMOL/L (ref 23–29)
CREAT SERPL-MCNC: 1.5 MG/DL (ref 0.5–1.4)
D DIMER PPP IA.FEU-MCNC: <0.19 MG/L FEU
DACRYOCYTES BLD QL SMEAR: ABNORMAL
DIFFERENTIAL METHOD: ABNORMAL
DLCO ADJ PRE: 12.02 ML/(MIN*MMHG) (ref 17.59–31.44)
DLCO SINGLE BREATH LLN: 17.59
DLCO SINGLE BREATH PRE REF: 45.5 %
DLCO SINGLE BREATH REF: 24.51
DLCOC SBVA LLN: 2.65
DLCOC SBVA PRE REF: 151.2 %
DLCOC SBVA REF: 4.02
DLCOC SINGLE BREATH LLN: 17.59
DLCOC SINGLE BREATH PRE REF: 49 %
DLCOC SINGLE BREATH REF: 24.51
DLCOVA LLN: 2.65
DLCOVA PRE REF: 140.4 %
DLCOVA PRE: 5.64 ML/(MIN*MMHG*L) (ref 2.65–5.38)
DLCOVA REF: 4.02
DLVAADJ PRE: 6.07 ML/(MIN*MMHG*L) (ref 2.65–5.38)
EOSINOPHIL # BLD AUTO: 0.1 K/UL (ref 0–0.5)
EOSINOPHIL NFR BLD: 0.4 % (ref 0–8)
ERV LLN: 1.03
ERV PRE REF: 60.3 %
ERV REF: 1.03
ERYTHROCYTE [DISTWIDTH] IN BLOOD BY AUTOMATED COUNT: 14 % (ref 11.5–14.5)
EST. GFR  (AFRICAN AMERICAN): 57 ML/MIN/1.73 M^2
EST. GFR  (NON AFRICAN AMERICAN): 49 ML/MIN/1.73 M^2
FEF 25 75 LLN: 0.9
FEF 25 75 PRE REF: 104.2 %
FEF 25 75 REF: 2.2
FEV1 FVC LLN: 67
FEV1 FVC PRE REF: 103.7 %
FEV1 FVC REF: 79
FEV1 LLN: 1.82
FEV1 PRE REF: 72.8 %
FEV1 REF: 2.54
FRCPLETH LLN: 2.34
FRCPLETH PREREF: 63.8 %
FRCPLETH REF: 3.33
FVC LLN: 2.38
FVC PRE REF: 70.3 %
FVC REF: 3.22
GIANT PLATELETS BLD QL SMEAR: PRESENT
GLUCOSE SERPL-MCNC: 59 MG/DL (ref 70–110)
HCT VFR BLD AUTO: 41.4 % (ref 40–54)
HGB BLD-MCNC: 13.2 G/DL (ref 14–18)
HYPOCHROMIA BLD QL SMEAR: ABNORMAL
IVC PRE: 1.17 L (ref 2.38–4.06)
IVC SINGLE BREATH LLN: 2.38
IVC SINGLE BREATH PRE REF: 36.3 %
IVC SINGLE BREATH REF: 3.22
LYMPHOCYTES # BLD AUTO: 5.9 K/UL (ref 1–4.8)
LYMPHOCYTES NFR BLD: 36.9 % (ref 18–48)
MAGNESIUM SERPL-MCNC: 1.7 MG/DL (ref 1.6–2.6)
MCH RBC QN AUTO: 28.4 PG (ref 27–31)
MCHC RBC AUTO-ENTMCNC: 31.9 G/DL (ref 32–36)
MCV RBC AUTO: 89 FL (ref 82–98)
MONOCYTES # BLD AUTO: 1.7 K/UL (ref 0.3–1)
MONOCYTES NFR BLD: 10.6 % (ref 4–15)
MVV LLN: 95
MVV PRE REF: 49.9 %
MVV REF: 112
NEUTROPHILS # BLD AUTO: 8.4 K/UL (ref 1.8–7.7)
NEUTROPHILS NFR BLD: 52 % (ref 38–73)
OVALOCYTES BLD QL SMEAR: ABNORMAL
PEF LLN: 4.85
PEF PRE REF: 70.9 %
PEF REF: 7.14
PLATELET # BLD AUTO: 194 K/UL (ref 150–350)
PLATELET BLD QL SMEAR: ABNORMAL
PMV BLD AUTO: 12 FL (ref 9.2–12.9)
POCT GLUCOSE: 108 MG/DL (ref 70–110)
POCT GLUCOSE: 213 MG/DL (ref 70–110)
POCT GLUCOSE: 50 MG/DL (ref 70–110)
POCT GLUCOSE: 80 MG/DL (ref 70–110)
POIKILOCYTOSIS BLD QL SMEAR: SLIGHT
POTASSIUM SERPL-SCNC: 3.7 MMOL/L (ref 3.5–5.1)
PRE DLCO: 11.16 ML/(MIN*MMHG) (ref 17.59–31.44)
PRE ERV: 0.62 L (ref 1.03–1.03)
PRE FEF 25 75: 2.29 L/S (ref 0.9–3.5)
PRE FET 100: 7.71 SEC
PRE FEV1 FVC: 81.62 % (ref 66.68–90.72)
PRE FEV1: 1.85 L (ref 1.82–3.25)
PRE FRC PL: 2.13 L
PRE FVC: 2.26 L (ref 2.38–4.06)
PRE MVV: 56 L/MIN (ref 95.44–129.12)
PRE PEF: 5.06 L/S (ref 4.85–9.42)
PRE RV: 1.35 L (ref 1.62–2.97)
PRE TLC: 3.71 L (ref 4.95–7.25)
PROT SERPL-MCNC: 7.1 G/DL (ref 6–8.4)
RAW LLN: 3.06
RAW PRE REF: 120.9 %
RAW PRE: 3.7 CMH2O*S/L (ref 3.06–3.06)
RAW REF: 3.06
RBC # BLD AUTO: 4.65 M/UL (ref 4.6–6.2)
RV LLN: 1.62
RV PRE REF: 58.9 %
RV REF: 2.3
RVTLC LLN: 29
RVTLC PRE REF: 95.7 %
RVTLC PRE: 36.52 % (ref 29.16–47.12)
RVTLC REF: 38
SODIUM SERPL-SCNC: 140 MMOL/L (ref 136–145)
SPHEROCYTES BLD QL SMEAR: ABNORMAL
TLC LLN: 4.95
TLC PRE REF: 60.7 %
TLC REF: 6.1
TROPONIN I SERPL DL<=0.01 NG/ML-MCNC: 0.07 NG/ML (ref 0–0.03)
TROPONIN I SERPL DL<=0.01 NG/ML-MCNC: 0.08 NG/ML (ref 0–0.03)
TROPONIN I SERPL DL<=0.01 NG/ML-MCNC: 0.08 NG/ML (ref 0–0.03)
TSH SERPL DL<=0.005 MIU/L-ACNC: 1.78 UIU/ML (ref 0.4–4)
VA PRE: 2.02 L (ref 5.95–5.95)
VA SINGLE BREATH LLN: 5.95
VA SINGLE BREATH PRE REF: 34 %
VA SINGLE BREATH REF: 5.95
VC LLN: 2.38
VC PRE REF: 73.1 %
VC PRE: 2.35 L (ref 2.38–4.06)
VC REF: 3.22
VTGRAWPRE: 2.04 L
WBC # BLD AUTO: 16.06 K/UL (ref 3.9–12.7)

## 2019-08-07 PROCEDURE — 94726 PULM FUNCT TST PLETHYSMOGRAP: ICD-10-PCS | Mod: HCNC,S$GLB,, | Performed by: INTERNAL MEDICINE

## 2019-08-07 PROCEDURE — 94799 UNLISTED PULMONARY SVC/PX: CPT | Mod: HCNC

## 2019-08-07 PROCEDURE — 96366 THER/PROPH/DIAG IV INF ADDON: CPT

## 2019-08-07 PROCEDURE — 85379 FIBRIN DEGRADATION QUANT: CPT | Mod: HCNC

## 2019-08-07 PROCEDURE — 25000003 PHARM REV CODE 250: Mod: HCNC | Performed by: NURSE PRACTITIONER

## 2019-08-07 PROCEDURE — 96365 THER/PROPH/DIAG IV INF INIT: CPT

## 2019-08-07 PROCEDURE — 94729 DIFFUSING CAPACITY: CPT | Mod: HCNC,S$GLB,, | Performed by: INTERNAL MEDICINE

## 2019-08-07 PROCEDURE — 85025 COMPLETE CBC W/AUTO DIFF WBC: CPT | Mod: 91,HCNC

## 2019-08-07 PROCEDURE — 83735 ASSAY OF MAGNESIUM: CPT | Mod: HCNC

## 2019-08-07 PROCEDURE — G0378 HOSPITAL OBSERVATION PER HR: HCPCS | Mod: HCNC

## 2019-08-07 PROCEDURE — 94726 PLETHYSMOGRAPHY LUNG VOLUMES: CPT | Mod: HCNC,S$GLB,, | Performed by: INTERNAL MEDICINE

## 2019-08-07 PROCEDURE — 99291 CRITICAL CARE FIRST HOUR: CPT | Mod: 25,HCNC

## 2019-08-07 PROCEDURE — 83880 ASSAY OF NATRIURETIC PEPTIDE: CPT | Mod: HCNC

## 2019-08-07 PROCEDURE — 94640 AIRWAY INHALATION TREATMENT: CPT | Mod: HCNC

## 2019-08-07 PROCEDURE — 94729 PR C02/MEMBANE DIFFUSE CAPACITY: ICD-10-PCS | Mod: HCNC,S$GLB,, | Performed by: INTERNAL MEDICINE

## 2019-08-07 PROCEDURE — 96375 TX/PRO/DX INJ NEW DRUG ADDON: CPT | Mod: HCNC

## 2019-08-07 PROCEDURE — 80053 COMPREHEN METABOLIC PANEL: CPT | Mod: HCNC

## 2019-08-07 PROCEDURE — 25000003 PHARM REV CODE 250: Mod: HCNC

## 2019-08-07 PROCEDURE — 25000242 PHARM REV CODE 250 ALT 637 W/ HCPCS: Mod: HCNC | Performed by: NURSE PRACTITIONER

## 2019-08-07 PROCEDURE — 94060 PR EVAL OF BRONCHOSPASM: ICD-10-PCS | Mod: 53,HCNC,S$GLB, | Performed by: INTERNAL MEDICINE

## 2019-08-07 PROCEDURE — 94060 EVALUATION OF WHEEZING: CPT | Mod: 53,HCNC,S$GLB, | Performed by: INTERNAL MEDICINE

## 2019-08-07 PROCEDURE — 25000003 PHARM REV CODE 250: Mod: HCNC | Performed by: EMERGENCY MEDICINE

## 2019-08-07 PROCEDURE — 82962 GLUCOSE BLOOD TEST: CPT | Mod: HCNC,91

## 2019-08-07 PROCEDURE — 84484 ASSAY OF TROPONIN QUANT: CPT | Mod: 91,HCNC

## 2019-08-07 PROCEDURE — 84443 ASSAY THYROID STIM HORMONE: CPT | Mod: HCNC

## 2019-08-07 PROCEDURE — 63600175 PHARM REV CODE 636 W HCPCS: Mod: HCNC | Performed by: EMERGENCY MEDICINE

## 2019-08-07 PROCEDURE — 63600175 PHARM REV CODE 636 W HCPCS: Mod: HCNC | Performed by: NURSE PRACTITIONER

## 2019-08-07 PROCEDURE — 96361 HYDRATE IV INFUSION ADD-ON: CPT | Mod: HCNC

## 2019-08-07 PROCEDURE — 36415 COLL VENOUS BLD VENIPUNCTURE: CPT | Mod: HCNC

## 2019-08-07 RX ORDER — DILTIAZEM HYDROCHLORIDE 5 MG/ML
10 INJECTION INTRAVENOUS
Status: COMPLETED | OUTPATIENT
Start: 2019-08-07 | End: 2019-08-07

## 2019-08-07 RX ORDER — ISOSORBIDE MONONITRATE 60 MG/1
60 TABLET, EXTENDED RELEASE ORAL DAILY
Status: DISCONTINUED | OUTPATIENT
Start: 2019-08-08 | End: 2019-08-08 | Stop reason: HOSPADM

## 2019-08-07 RX ORDER — DILTIAZEM HCL 1 MG/ML
5 INJECTION, SOLUTION INTRAVENOUS CONTINUOUS
Status: DISCONTINUED | OUTPATIENT
Start: 2019-08-07 | End: 2019-08-07

## 2019-08-07 RX ORDER — ENOXAPARIN SODIUM 100 MG/ML
40 INJECTION SUBCUTANEOUS EVERY 24 HOURS
Status: DISCONTINUED | OUTPATIENT
Start: 2019-08-07 | End: 2019-08-07

## 2019-08-07 RX ORDER — GLUCAGON 1 MG
1 KIT INJECTION
Status: DISCONTINUED | OUTPATIENT
Start: 2019-08-07 | End: 2019-08-08 | Stop reason: HOSPADM

## 2019-08-07 RX ORDER — MORPHINE SULFATE 4 MG/ML
4 INJECTION, SOLUTION INTRAMUSCULAR; INTRAVENOUS
Status: DISCONTINUED | OUTPATIENT
Start: 2019-08-07 | End: 2019-08-07

## 2019-08-07 RX ORDER — SERTRALINE HYDROCHLORIDE 50 MG/1
50 TABLET, FILM COATED ORAL DAILY
Status: DISCONTINUED | OUTPATIENT
Start: 2019-08-08 | End: 2019-08-08 | Stop reason: HOSPADM

## 2019-08-07 RX ORDER — DILTIAZEM HCL/D5W 125 MG/125
5 PLASTIC BAG, INJECTION (ML) INTRAVENOUS CONTINUOUS
Status: DISCONTINUED | OUTPATIENT
Start: 2019-08-07 | End: 2019-08-08

## 2019-08-07 RX ORDER — BUDESONIDE 0.5 MG/2ML
0.5 INHALANT ORAL EVERY 12 HOURS
Status: DISCONTINUED | OUTPATIENT
Start: 2019-08-08 | End: 2019-08-08 | Stop reason: HOSPADM

## 2019-08-07 RX ORDER — SODIUM CHLORIDE 9 MG/ML
INJECTION, SOLUTION INTRAVENOUS CONTINUOUS
Status: DISCONTINUED | OUTPATIENT
Start: 2019-08-07 | End: 2019-08-08 | Stop reason: HOSPADM

## 2019-08-07 RX ORDER — PANTOPRAZOLE SODIUM 40 MG/1
40 TABLET, DELAYED RELEASE ORAL DAILY
Status: DISCONTINUED | OUTPATIENT
Start: 2019-08-08 | End: 2019-08-08 | Stop reason: HOSPADM

## 2019-08-07 RX ORDER — METOPROLOL TARTRATE 25 MG/1
25 TABLET, FILM COATED ORAL
Status: COMPLETED | OUTPATIENT
Start: 2019-08-07 | End: 2019-08-07

## 2019-08-07 RX ORDER — ONDANSETRON 2 MG/ML
4 INJECTION INTRAMUSCULAR; INTRAVENOUS
Status: COMPLETED | OUTPATIENT
Start: 2019-08-07 | End: 2019-08-07

## 2019-08-07 RX ORDER — IBUPROFEN 200 MG
16 TABLET ORAL
Status: DISCONTINUED | OUTPATIENT
Start: 2019-08-07 | End: 2019-08-08 | Stop reason: HOSPADM

## 2019-08-07 RX ORDER — NITROGLYCERIN 0.4 MG/1
0.4 TABLET SUBLINGUAL EVERY 5 MIN PRN
Status: DISCONTINUED | OUTPATIENT
Start: 2019-08-07 | End: 2019-08-08 | Stop reason: HOSPADM

## 2019-08-07 RX ORDER — IBUPROFEN 200 MG
24 TABLET ORAL
Status: DISCONTINUED | OUTPATIENT
Start: 2019-08-07 | End: 2019-08-08 | Stop reason: HOSPADM

## 2019-08-07 RX ORDER — INSULIN ASPART 100 [IU]/ML
0-5 INJECTION, SOLUTION INTRAVENOUS; SUBCUTANEOUS
Status: DISCONTINUED | OUTPATIENT
Start: 2019-08-07 | End: 2019-08-08 | Stop reason: HOSPADM

## 2019-08-07 RX ORDER — DEXTROSE 50 % IN WATER (D50W) INTRAVENOUS SYRINGE
25
Status: COMPLETED | OUTPATIENT
Start: 2019-08-07 | End: 2019-08-07

## 2019-08-07 RX ORDER — DILTIAZEM HYDROCHLORIDE 5 MG/ML
15 INJECTION INTRAVENOUS
Status: DISCONTINUED | OUTPATIENT
Start: 2019-08-07 | End: 2019-08-07

## 2019-08-07 RX ORDER — ASPIRIN 81 MG/1
81 TABLET ORAL DAILY
Status: DISCONTINUED | OUTPATIENT
Start: 2019-08-08 | End: 2019-08-08 | Stop reason: HOSPADM

## 2019-08-07 RX ORDER — ASPIRIN 325 MG
325 TABLET ORAL
Status: DISPENSED | OUTPATIENT
Start: 2019-08-07 | End: 2019-08-08

## 2019-08-07 RX ORDER — ARFORMOTEROL TARTRATE 15 UG/2ML
15 SOLUTION RESPIRATORY (INHALATION) 2 TIMES DAILY
Status: DISCONTINUED | OUTPATIENT
Start: 2019-08-07 | End: 2019-08-08 | Stop reason: HOSPADM

## 2019-08-07 RX ORDER — ATORVASTATIN CALCIUM 40 MG/1
40 TABLET, FILM COATED ORAL NIGHTLY
Status: DISCONTINUED | OUTPATIENT
Start: 2019-08-07 | End: 2019-08-08 | Stop reason: HOSPADM

## 2019-08-07 RX ADMIN — ONDANSETRON 4 MG: 2 INJECTION INTRAMUSCULAR; INTRAVENOUS at 02:08

## 2019-08-07 RX ADMIN — DEXTROSE MONOHYDRATE 25 G: 500 INJECTION PARENTERAL at 02:08

## 2019-08-07 RX ADMIN — ATORVASTATIN CALCIUM 40 MG: 40 TABLET, FILM COATED ORAL at 09:08

## 2019-08-07 RX ADMIN — METOPROLOL TARTRATE 25 MG: 25 TABLET ORAL at 03:08

## 2019-08-07 RX ADMIN — DILTIAZEM HYDROCHLORIDE 10 MG: 5 INJECTION INTRAVENOUS at 03:08

## 2019-08-07 RX ADMIN — SODIUM CHLORIDE: 0.9 INJECTION, SOLUTION INTRAVENOUS at 07:08

## 2019-08-07 RX ADMIN — Medication 10 MG/HR: at 03:08

## 2019-08-07 RX ADMIN — ARFORMOTEROL TARTRATE 15 MCG: 15 SOLUTION RESPIRATORY (INHALATION) at 08:08

## 2019-08-07 RX ADMIN — SODIUM CHLORIDE 500 ML: 0.9 INJECTION, SOLUTION INTRAVENOUS at 02:08

## 2019-08-07 RX ADMIN — APIXABAN 5 MG: 2.5 TABLET, FILM COATED ORAL at 09:08

## 2019-08-07 NOTE — SIGNIFICANT EVENT
Response Team - Patient Flow Sheet     Date: 2019  Time 1227  Location: Pulmonary Lab  Name: Crow Green  : 1955  MRN: [id number]  PCP: Mateo Lambert DO  Allergies: Review of patient's allergies indicates:  No Known Allergies  Past Medical History:    Past Medical History:   Diagnosis Date    Acute kidney injury     Arthritis     Asthma     Atrial fibrillation     CHF (congestive heart failure)     Depression     Diabetes mellitus, type 2 Diagnosed in     Elevated PSA     Hypertension     Sleep apnea        Reason for response team call:chest pain     Injury:  NO    Vitals:  Time BP HR Resp SPO2 Glucose   1229 108/42 71      1233  59  99 on 2lpm    1235 101/53 61  98 2lpm    1243 83/43 68  97 2lpm    1247 74/46 88          EKG performed: yes  EKG Ordered By: Dr Alicea  EKG Read By: Dr Alicea              Medication 1  Fluid / Medication: Normal saline right hand 24 guage  Time: 1236  Gauge:  24   Rate: bolus   Started by: Delores VEGA    Medication 2  Fluid / Medication: O2 2lpm NC  Time: 1227  Gauge:     Rate:    Started by:Nhung RRT        Time Notes   1231 Nitro 0.4 SL   1239 EKG   1243 Metoprolol 5mg IV push             Response Team Members: Dang Lo Monica, Kayla, Dina, Veronda, Jacki,Dr Alicea  Provider Response Called: yes    Provider Name/Time: Dr Alicea 1226  Ambulance Called: Yes   time: 1233, arrival time: 1247  Patient Transported To: [facility] [place]  Report Called To:  Family/Friend/Caregiver Notified: Yes

## 2019-08-07 NOTE — PROGRESS NOTES
"8/7/19 - SUMMARY: Rec''d return call from pt. He advised he has not yet started receiving meals in the mail from the [facility]. He advised his blood sugars are "jamal high", stating his fasting value this morning was 224. He continues to have a rash over a lot of his body, but states it is a little improved since he has been taking prednisone. He does report increased appetite since he has been on prednisone, as well. He saw his dermatologist yesterday and is waiting to see another one now related to the rash/dermatomyositis.  INTERVENTION: Discussed low carb diet and encouraged intake of lean protein and vegetables at every meal. Encouraged to try to avoid concentrated sweets like high sugar/high calorie drinks and snack foods. Discussed effect of prednisone to elevate blood sugar and cause increased appetite which may be responsible for reported elevated blood sugars and increased appetite. Gave pt's daughter the number to the [facility] Well Dine Meal program and encouraged them to call to ask about status of meal delivery. He reports he is taking all meds as prescribed.   PLAN: Is he having any hypo or hyperglycemia symptoms? Has he initiated his [facility] OTC or transportation benefits? Discuss CAD and CHF diagnoses and assess understanding. Is he still taking all meds as prescribed and is he using the med organizer mailed to him?    Todays OPCM Self-Management Care Plan was reviewed with the patients/caregivers input based on identified barriers from todays and previous assessments.  Goals were reviewed today with the patient/caregiver and the patient has agreed to continue to work towards these goals to improve his/her overall well-being. Patient verbalized understanding of the care plan, goals, and all of today's instructions. Encouraged patient/caregiver to communicate with his/her physician and health care team about health conditions and the treatment plan.  Provided my contact " information today and encouraged patient/caregiver to call me with any questions as needed. Dang Clay RN

## 2019-08-07 NOTE — ED PROVIDER NOTES
SCRIBE #1 NOTE: I, Pina Lemus, am scribing for, and in the presence of, Anthony Jansen MD. I have scribed the entire note.       History     Chief Complaint   Patient presents with    Chest Pain     c/o CP, referred to ED by Dr. Alicea with A-fib w/RVR     Review of patient's allergies indicates:  No Known Allergies      History of Present Illness     HPI    8/7/2019, 1:32 PM  History obtained from the patient and EMS      History of Present Illness: Crow Green is a 64 y.o. male patient with a PMHx of Afib, asthma, CHF, DM, and HTN who was sent by Dr. Alicea (Pulmonology) to the Emergency Department for evaluation of CP which onset suddenly in clinic PTA. While being evaluated in clinic, pt developed CP after albuterol tx and rapid response was called. Pt was given NTG 0.4 SL and CP was partially resolved. EKG results Afib with RVR and pt was given metoprolol 5 mg IV. Symptoms are constant and moderate in severity.  No mitigating or exacerbating factors reported. No other sxs. Patient denies any fever, chills, diaphoresis, dizziness, leg swelling, palpitations, cough, SOB, extremity numbness/weakness, N/V, and all other sxs at this time. No further complaints or concerns at this time.       Arrival mode: Ambulance service    PCP: Mateo Lambert DO        Past Medical History:  Past Medical History:   Diagnosis Date    Acute kidney injury     Arthritis     Asthma     Atrial fibrillation     CHF (congestive heart failure)     Depression     Diabetes mellitus, type 2 Diagnosed in 2000    Elevated PSA     Hypertension     Sleep apnea        Past Surgical History:  Past Surgical History:   Procedure Laterality Date    BIOPSY-ARTERY-TEMPORAL Left 9/1/2017    Performed by Torin Bishop MD at [facility] OR    CHOLECYSTECTOMY      CHOLECYSTECTOMY-LAPAROSCOPIC N/A 3/17/2017    Performed by Louis O. Jeansonne IV, MD at [facility] OR         Family History:  Family History   Problem Relation Age of Onset     Glaucoma Mother     Cataracts Mother     Cataracts Father     Glaucoma Sister     Cataracts Sister     Glaucoma Maternal Aunt     No Known Problems Brother     No Known Problems Daughter     No Known Problems Son     Prostate cancer Neg Hx        Social History:  Social History     Tobacco Use    Smoking status: Never Smoker    Smokeless tobacco: Never Used   Substance and Sexual Activity    Alcohol use: No    Drug use: No    Sexual activity: Not Currently        Review of Systems     Review of Systems   Constitutional: Negative for chills, diaphoresis and fever.   HENT: Negative for sore throat.    Respiratory: Negative for cough and shortness of breath.    Cardiovascular: Positive for chest pain. Negative for palpitations and leg swelling.   Gastrointestinal: Negative for nausea and vomiting.   Genitourinary: Negative for dysuria.   Musculoskeletal: Negative for back pain.   Skin: Negative for rash.   Neurological: Negative for dizziness and weakness.   Hematological: Does not bruise/bleed easily.   All other systems reviewed and are negative.       Physical Exam     Initial Vitals [08/07/19 1326]   BP Pulse Resp Temp SpO2   101/70 (!) 116 20 97.9 °F (36.6 °C) 98 %      MAP       --          Physical Exam  Nursing Notes and Vital Signs Reviewed.  Constitutional: Patient is in mild distress. Well-developed and well-nourished.  Head: Atraumatic. Normocephalic.  Eyes: PERRL. EOM intact. Conjunctivae are not pale. No scleral icterus.  ENT: Mucous membranes are moist. Oropharynx is clear and symmetric.    Neck: Supple. Full ROM. No lymphadenopathy.  Cardiovascular: Tachycardic Irregularly irregular. No murmurs, rubs, or gallops. Distal pulses are 2+ and symmetric.  Pulmonary/Chest: No respiratory distress. Clear to auscultation bilaterally. No wheezing or rales.  Abdominal: Soft and non-distended.  There is no tenderness.  No rebound, guarding, or rigidity. Good bowel sounds.  Genitourinary: No CVA  "tenderness  Musculoskeletal: Moves all extremities. No obvious deformities. No edema. No calf tenderness.  Skin: Warm and dry.  Neurological:  Alert, awake, and appropriate.  Normal speech.  No acute focal neurological deficits are appreciated.  Psychiatric: Normal affect. Good eye contact. Appropriate in content.     ED Course   Critical Care  Date/Time: 8/7/2019 3:42 PM  Performed by: Anthony Jansen MD  Authorized by: Anthony Jansen MD   Direct patient critical care time: 20 minutes  Additional history critical care time: 4 minutes  Ordering / reviewing critical care time: 4 minutes  Documentation critical care time: 4 minutes  Consulting other physicians critical care time: 4 minutes  Consult with family critical care time: 4 minutes  Total critical care time (exclusive of procedural time) : 40 minutes  Critical care time was exclusive of separately billable procedures and treating other patients and teaching time.  Critical care was necessary to treat or prevent imminent or life-threatening deterioration of the following conditions: Afib with RVR.  Critical care was time spent personally by me on the following activities: blood draw for specimens, development of treatment plan with patient or surrogate, discussions with consultants, interpretation of cardiac output measurements, evaluation of patient's response to treatment, examination of patient, obtaining history from patient or surrogate, ordering and performing treatments and interventions, ordering and review of laboratory studies, ordering and review of radiographic studies, re-evaluation of patient's condition, review of old charts and pulse oximetry.        ED Vital Signs:  Vitals:    08/07/19 1326 08/07/19 1336 08/07/19 1338 08/07/19 1400   BP: 101/70      Pulse: (!) 116  77 (!) 114   Resp: 20      Temp: 97.9 °F (36.6 °C)      SpO2: 98%      Weight:  90.6 kg (199 lb 11.8 oz)     Height:  5' 5" (1.651 m)      08/07/19 1428 08/07/19 " 1442 08/07/19 1502 08/07/19 1532   BP: (!) 92/50 (!) 106/55 116/72 120/70   Pulse:  82 92 78   Resp: 16 20 19 16   Temp:       SpO2: 95% (!) 94% 95% 95%   Weight:       Height:        08/07/19 1544   BP:    Pulse: (!) 122   Resp: (!) 23   Temp:    SpO2: 98%   Weight:    Height:        Abnormal Lab Results:  Labs Reviewed   CBC W/ AUTO DIFFERENTIAL - Abnormal; Notable for the following components:       Result Value    WBC 16.06 (*)     Hemoglobin 13.2 (*)     Mean Corpuscular Hemoglobin Conc 31.9 (*)     Gran # (ANC) 8.4 (*)     Lymph # 5.9 (*)     Mono # 1.7 (*)     All other components within normal limits   COMPREHENSIVE METABOLIC PANEL - Abnormal; Notable for the following components:    Glucose 59 (*)     BUN, Bld 37 (*)     Creatinine 1.5 (*)     eGFR if  57 (*)     eGFR if non  49 (*)     All other components within normal limits   TROPONIN I - Abnormal; Notable for the following components:    Troponin I 0.078 (*)     All other components within normal limits   POCT GLUCOSE - Abnormal; Notable for the following components:    POCT Glucose 50 (*)     All other components within normal limits   POCT GLUCOSE - Abnormal; Notable for the following components:    POCT Glucose 213 (*)     All other components within normal limits   D DIMER, QUANTITATIVE   B-TYPE NATRIURETIC PEPTIDE        All Lab Results:  Results for orders placed or performed during the hospital encounter of 08/07/19   CBC auto differential   Result Value Ref Range    WBC 16.06 (H) 3.90 - 12.70 K/uL    RBC 4.65 4.60 - 6.20 M/uL    Hemoglobin 13.2 (L) 14.0 - 18.0 g/dL    Hematocrit 41.4 40.0 - 54.0 %    Mean Corpuscular Volume 89 82 - 98 fL    Mean Corpuscular Hemoglobin 28.4 27.0 - 31.0 pg    Mean Corpuscular Hemoglobin Conc 31.9 (L) 32.0 - 36.0 g/dL    RDW 14.0 11.5 - 14.5 %    Platelets 194 150 - 350 K/uL    MPV 12.0 9.2 - 12.9 fL    Gran # (ANC) 8.4 (H) 1.8 - 7.7 K/uL    Lymph # 5.9 (H) 1.0 - 4.8 K/uL    Mono #  1.7 (H) 0.3 - 1.0 K/uL    Eos # 0.1 0.0 - 0.5 K/uL    Baso # 0.01 0.00 - 0.20 K/uL    Gran% 52.0 38.0 - 73.0 %    Lymph% 36.9 18.0 - 48.0 %    Mono% 10.6 4.0 - 15.0 %    Eosinophil% 0.4 0.0 - 8.0 %    Basophil% 0.1 0.0 - 1.9 %    Platelet Estimate Appears normal     Aniso Slight     Poik Slight     Hypo Occasional     Ovalocytes Occasional     Tear Drop Cells Occasional     Spherocytes Occasional     Large/Giant Platelets Present     Differential Method Automated    Comprehensive metabolic panel   Result Value Ref Range    Sodium 140 136 - 145 mmol/L    Potassium 3.7 3.5 - 5.1 mmol/L    Chloride 103 95 - 110 mmol/L    CO2 27 23 - 29 mmol/L    Glucose 59 (L) 70 - 110 mg/dL    BUN, Bld 37 (H) 8 - 23 mg/dL    Creatinine 1.5 (H) 0.5 - 1.4 mg/dL    Calcium 9.5 8.7 - 10.5 mg/dL    Total Protein 7.1 6.0 - 8.4 g/dL    Albumin 3.5 3.5 - 5.2 g/dL    Total Bilirubin 0.5 0.1 - 1.0 mg/dL    Alkaline Phosphatase 61 55 - 135 U/L    AST 17 10 - 40 U/L    ALT 25 10 - 44 U/L    Anion Gap 10 8 - 16 mmol/L    eGFR if African American 57 (A) >60 mL/min/1.73 m^2    eGFR if non African American 49 (A) >60 mL/min/1.73 m^2   Troponin I #1   Result Value Ref Range    Troponin I 0.078 (H) 0.000 - 0.026 ng/mL   D dimer, quantitative   Result Value Ref Range    D-Dimer <0.19 <0.50 mg/L FEU   POCT glucose   Result Value Ref Range    POCT Glucose 50 (LL) 70 - 110 mg/dL   POCT glucose   Result Value Ref Range    POCT Glucose 213 (H) 70 - 110 mg/dL         Imaging Results:  Imaging Results          X-Ray Chest AP Portable (Final result)  Result time 08/07/19 14:14:04    Final result by Jayjay De La Rosa MD (08/07/19 14:14:04)                 Impression:      No acute abnormality.      Electronically signed by: Jayjay De La Rosa  Date:    08/07/2019  Time:    14:14             Narrative:    EXAMINATION:  XR CHEST AP PORTABLE    CLINICAL HISTORY:  Chest Pain;.    TECHNIQUE:  Single frontal portable view of the chest was  performed.    COMPARISON:  07/24/2019    FINDINGS:  Support devices: None    The lungs are clear, with normal appearance of pulmonary vasculature and no pleural effusion or pneumothorax.    The cardiac silhouette is normal in size. The hilar and mediastinal contours are unremarkable.    Bones are intact.                                 The EKG was ordered, reviewed, and independently interpreted by the ED provider.  Interpretation time: 1400  Rate: 114 BPM  Rhythm: atrial fibrillation with RVR  Interpretation: L anterior fascicular block. Septal infarct, age undetermined. ST and T wave abnormality, consider lateral ischemia. No STEMI.         The Emergency Provider reviewed the vital signs and test results, which are outlined above.     ED Discussion     3:45 PM: Discussed case with JEWELS Jimenez ([facility]). Dr. Chavez agrees with current care and management of pt and accepts admission.   Admitting Service: Hospital medicine   Admitting Physician: Dr. Chavez  Admit to: Obs Tele    3:50 PM: Re-evaluated pt. I have discussed test results, shared treatment plan, and the need for admission. Pt expresses understanding at this time and agree with all information. All questions answered. Pt has no further questions or concerns at this time. Pt is ready for admit.      ED Medication(s):  Medications   aspirin tablet 325 mg (325 mg Oral Not Given 8/7/19 1400)   diltiaZEM 125 mg in dextrose 5% 125 mL infusion (non-titrating) (10 mg/hr Intravenous New Bag 8/7/19 1555)   sodium chloride 0.9% bolus 500 mL (0 mLs Intravenous Stopped 8/7/19 1508)   ondansetron injection 4 mg (4 mg Intravenous Given 8/7/19 1418)   dextrose 50 % in water (D50W) injection 25 g (25 g Intravenous Given 8/7/19 1436)   metoprolol tartrate (LOPRESSOR) tablet 25 mg (25 mg Oral Given 8/7/19 1525)   diltiaZEM injection 10 mg (10 mg Intravenous Given 8/7/19 1545)       New Prescriptions    No medications on file                 Medical  Decision Making:   Clinical Tests:   Lab Tests: Ordered and Reviewed  Radiological Study: Ordered and Reviewed  Medical Tests: Ordered and Reviewed             Scribe Attestation:   Scribe #1: I performed the above scribed service and the documentation accurately describes the services I performed. I attest to the accuracy of the note.     Attending:   Physician Attestation Statement for Scribe #1: I, Anthony Jansen MD, personally performed the services described in this documentation, as scribed by Pina Lemus, in my presence, and it is both accurate and complete.           Clinical Impression       ICD-10-CM ICD-9-CM   1. Chest pain R07.9 786.50   2. Atrial fibrillation with RVR I48.91 427.31   3. Hypoglycemia E16.2 251.2       Disposition:   Disposition: Placed in Observation  Condition: Fair         Anthony Jansen MD  08/08/19 0619

## 2019-08-07 NOTE — PROGRESS NOTES
PFT with bronchodilator completed.    Physician Note:  Rapid response  Patient developed chest pain after albuterol treatment  Rapid response called    Given NTG 0.4 sl  Chest pain partially resolved  EMS called  Still with chest pressure    EKG - Atrial Fibrillation with rapid response noted    Given metoprolol 5 mg iv    Chest pressure improved    EMS arrived and given report

## 2019-08-08 VITALS
TEMPERATURE: 99 F | BODY MASS INDEX: 31.59 KG/M2 | HEART RATE: 73 BPM | SYSTOLIC BLOOD PRESSURE: 104 MMHG | HEIGHT: 65 IN | DIASTOLIC BLOOD PRESSURE: 61 MMHG | RESPIRATION RATE: 18 BRPM | WEIGHT: 189.63 LBS | OXYGEN SATURATION: 96 %

## 2019-08-08 DIAGNOSIS — I48.0 PAF (PAROXYSMAL ATRIAL FIBRILLATION): Primary | ICD-10-CM

## 2019-08-08 PROBLEM — N17.9 AKI (ACUTE KIDNEY INJURY): Status: RESOLVED | Noted: 2019-08-07 | Resolved: 2019-08-08

## 2019-08-08 PROBLEM — R07.9 CHEST PAIN: Status: RESOLVED | Noted: 2019-08-07 | Resolved: 2019-08-08

## 2019-08-08 PROBLEM — D72.829 LEUKOCYTOSIS: Status: RESOLVED | Noted: 2019-08-07 | Resolved: 2019-08-08

## 2019-08-08 LAB
ANION GAP SERPL CALC-SCNC: 8 MMOL/L (ref 8–16)
APTT BLDCRRT: 24.3 SEC (ref 21–32)
BASOPHILS # BLD AUTO: 0.02 K/UL (ref 0–0.2)
BASOPHILS NFR BLD: 0.2 % (ref 0–1.9)
BUN SERPL-MCNC: 29 MG/DL (ref 8–23)
CALCIUM SERPL-MCNC: 9 MG/DL (ref 8.7–10.5)
CHLORIDE SERPL-SCNC: 105 MMOL/L (ref 95–110)
CHOLEST SERPL-MCNC: 109 MG/DL (ref 120–199)
CHOLEST/HDLC SERPL: 2.6 {RATIO} (ref 2–5)
CO2 SERPL-SCNC: 28 MMOL/L (ref 23–29)
CREAT SERPL-MCNC: 1.1 MG/DL (ref 0.5–1.4)
DIASTOLIC DYSFUNCTION: YES
DIFFERENTIAL METHOD: ABNORMAL
EOSINOPHIL # BLD AUTO: 0.1 K/UL (ref 0–0.5)
EOSINOPHIL NFR BLD: 0.9 % (ref 0–8)
ERYTHROCYTE [DISTWIDTH] IN BLOOD BY AUTOMATED COUNT: 14.4 % (ref 11.5–14.5)
EST. GFR  (AFRICAN AMERICAN): >60 ML/MIN/1.73 M^2
EST. GFR  (NON AFRICAN AMERICAN): >60 ML/MIN/1.73 M^2
ESTIMATED AVG GLUCOSE: 232 MG/DL (ref 68–131)
ESTIMATED PA SYSTOLIC PRESSURE: 32.16
GLUCOSE SERPL-MCNC: 76 MG/DL (ref 70–110)
HBA1C MFR BLD HPLC: 9.7 % (ref 4–5.6)
HCT VFR BLD AUTO: 35.6 % (ref 40–54)
HDLC SERPL-MCNC: 42 MG/DL (ref 40–75)
HDLC SERPL: 38.5 % (ref 20–50)
HGB BLD-MCNC: 11.5 G/DL (ref 14–18)
INR PPP: 1 (ref 0.8–1.2)
LDLC SERPL CALC-MCNC: 33.4 MG/DL (ref 63–159)
LYMPHOCYTES # BLD AUTO: 5.2 K/UL (ref 1–4.8)
LYMPHOCYTES NFR BLD: 44.5 % (ref 18–48)
MAGNESIUM SERPL-MCNC: 1.5 MG/DL (ref 1.6–2.6)
MCH RBC QN AUTO: 28.5 PG (ref 27–31)
MCHC RBC AUTO-ENTMCNC: 32.3 G/DL (ref 32–36)
MCV RBC AUTO: 88 FL (ref 82–98)
MITRAL VALVE REGURGITATION: ABNORMAL
MONOCYTES # BLD AUTO: 0.8 K/UL (ref 0.3–1)
MONOCYTES NFR BLD: 7.2 % (ref 4–15)
NEUTROPHILS # BLD AUTO: 5.5 K/UL (ref 1.8–7.7)
NEUTROPHILS NFR BLD: 47.5 % (ref 38–73)
NONHDLC SERPL-MCNC: 67 MG/DL
PHOSPHATE SERPL-MCNC: 3.6 MG/DL (ref 2.7–4.5)
PLATELET # BLD AUTO: 194 K/UL (ref 150–350)
PMV BLD AUTO: 10.3 FL (ref 9.2–12.9)
POCT GLUCOSE: 75 MG/DL (ref 70–110)
POTASSIUM SERPL-SCNC: 3.7 MMOL/L (ref 3.5–5.1)
PROTHROMBIN TIME: 10.4 SEC (ref 9–12.5)
RBC # BLD AUTO: 4.04 M/UL (ref 4.6–6.2)
RETIRED EF AND QEF - SEE NOTES: 45 (ref 55–65)
SODIUM SERPL-SCNC: 141 MMOL/L (ref 136–145)
TRICUSPID VALVE REGURGITATION: ABNORMAL
TRIGL SERPL-MCNC: 168 MG/DL (ref 30–150)
TROPONIN I SERPL DL<=0.01 NG/ML-MCNC: 0.08 NG/ML (ref 0–0.03)
WBC # BLD AUTO: 11.6 K/UL (ref 3.9–12.7)

## 2019-08-08 PROCEDURE — 93306 2D ECHO WITH COLOR FLOW DOPPLER: ICD-10-PCS | Mod: 26,HCNC,, | Performed by: INTERNAL MEDICINE

## 2019-08-08 PROCEDURE — 84484 ASSAY OF TROPONIN QUANT: CPT | Mod: HCNC

## 2019-08-08 PROCEDURE — 93010 EKG 12-LEAD: ICD-10-PCS | Mod: HCNC,,, | Performed by: INTERNAL MEDICINE

## 2019-08-08 PROCEDURE — 99215 PR OFFICE/OUTPT VISIT, EST, LEVL V, 40-54 MIN: ICD-10-PCS | Mod: HCNC,25,, | Performed by: INTERNAL MEDICINE

## 2019-08-08 PROCEDURE — 85025 COMPLETE CBC W/AUTO DIFF WBC: CPT | Mod: HCNC

## 2019-08-08 PROCEDURE — 84100 ASSAY OF PHOSPHORUS: CPT | Mod: HCNC

## 2019-08-08 PROCEDURE — 83735 ASSAY OF MAGNESIUM: CPT | Mod: HCNC

## 2019-08-08 PROCEDURE — 94640 AIRWAY INHALATION TREATMENT: CPT | Mod: HCNC

## 2019-08-08 PROCEDURE — 94799 UNLISTED PULMONARY SVC/PX: CPT | Mod: HCNC

## 2019-08-08 PROCEDURE — 85730 THROMBOPLASTIN TIME PARTIAL: CPT | Mod: HCNC

## 2019-08-08 PROCEDURE — 80061 LIPID PANEL: CPT | Mod: HCNC

## 2019-08-08 PROCEDURE — 96366 THER/PROPH/DIAG IV INF ADDON: CPT

## 2019-08-08 PROCEDURE — 93306 TTE W/DOPPLER COMPLETE: CPT | Mod: 26,HCNC,, | Performed by: INTERNAL MEDICINE

## 2019-08-08 PROCEDURE — 93306 TTE W/DOPPLER COMPLETE: CPT | Mod: HCNC

## 2019-08-08 PROCEDURE — 99215 OFFICE O/P EST HI 40 MIN: CPT | Mod: HCNC,25,, | Performed by: INTERNAL MEDICINE

## 2019-08-08 PROCEDURE — 25000003 PHARM REV CODE 250: Mod: HCNC | Performed by: NURSE PRACTITIONER

## 2019-08-08 PROCEDURE — 85610 PROTHROMBIN TIME: CPT | Mod: HCNC

## 2019-08-08 PROCEDURE — G0378 HOSPITAL OBSERVATION PER HR: HCPCS | Mod: HCNC

## 2019-08-08 PROCEDURE — 99900035 HC TECH TIME PER 15 MIN (STAT): Mod: HCNC

## 2019-08-08 PROCEDURE — 94761 N-INVAS EAR/PLS OXIMETRY MLT: CPT | Mod: HCNC

## 2019-08-08 PROCEDURE — 93005 ELECTROCARDIOGRAM TRACING: CPT | Mod: HCNC

## 2019-08-08 PROCEDURE — 25000242 PHARM REV CODE 250 ALT 637 W/ HCPCS: Mod: HCNC | Performed by: NURSE PRACTITIONER

## 2019-08-08 PROCEDURE — 93010 ELECTROCARDIOGRAM REPORT: CPT | Mod: HCNC,,, | Performed by: INTERNAL MEDICINE

## 2019-08-08 PROCEDURE — 83036 HEMOGLOBIN GLYCOSYLATED A1C: CPT | Mod: HCNC

## 2019-08-08 PROCEDURE — 80048 BASIC METABOLIC PNL TOTAL CA: CPT | Mod: HCNC

## 2019-08-08 RX ORDER — ACETAMINOPHEN 325 MG/1
650 TABLET ORAL EVERY 6 HOURS PRN
Status: DISCONTINUED | OUTPATIENT
Start: 2019-08-08 | End: 2019-08-08 | Stop reason: HOSPADM

## 2019-08-08 RX ORDER — METOPROLOL SUCCINATE 100 MG/1
100 TABLET, EXTENDED RELEASE ORAL DAILY
Qty: 30 TABLET | Refills: 1 | Status: SHIPPED | OUTPATIENT
Start: 2019-08-09 | End: 2019-12-18

## 2019-08-08 RX ORDER — METOPROLOL SUCCINATE 50 MG/1
100 TABLET, EXTENDED RELEASE ORAL DAILY
Status: DISCONTINUED | OUTPATIENT
Start: 2019-08-08 | End: 2019-08-08 | Stop reason: HOSPADM

## 2019-08-08 RX ADMIN — ISOSORBIDE MONONITRATE 60 MG: 60 TABLET, EXTENDED RELEASE ORAL at 10:08

## 2019-08-08 RX ADMIN — APIXABAN 5 MG: 2.5 TABLET, FILM COATED ORAL at 09:08

## 2019-08-08 RX ADMIN — BUDESONIDE 0.5 MG: 0.5 SUSPENSION RESPIRATORY (INHALATION) at 07:08

## 2019-08-08 RX ADMIN — ASPIRIN 81 MG: 81 TABLET, COATED ORAL at 10:08

## 2019-08-08 RX ADMIN — METOPROLOL SUCCINATE 100 MG: 50 TABLET, EXTENDED RELEASE ORAL at 10:08

## 2019-08-08 RX ADMIN — PANTOPRAZOLE SODIUM 40 MG: 40 TABLET, DELAYED RELEASE ORAL at 10:08

## 2019-08-08 RX ADMIN — ACETAMINOPHEN 650 MG: 325 TABLET ORAL at 04:08

## 2019-08-08 RX ADMIN — ARFORMOTEROL TARTRATE 15 MCG: 15 SOLUTION RESPIRATORY (INHALATION) at 07:08

## 2019-08-08 RX ADMIN — SERTRALINE HYDROCHLORIDE 50 MG: 50 TABLET ORAL at 10:08

## 2019-08-08 NOTE — ASSESSMENT & PLAN NOTE
Troponin 0.078, 0.081, 0.074, 0.079.   Troponin flat and chronically elevated.  nonobstuctive CAD on [facility] in Nov 2016

## 2019-08-08 NOTE — PLAN OF CARE
CM met with patient at the bedside.  He does not anticipate any discharge needs at this time.  CM updated whiteboard with contact information for any needs/questions.

## 2019-08-08 NOTE — ASSESSMENT & PLAN NOTE
-Pt admitted to Observation Unit   -in the setting of Afib w/RVR  -Cardiology consult  -Eliquis continued   Serial cardiac enzymes  -beta blocker ,ASA, Imdur, and statin continued   -ACE held due to JOSE MARIA  Nitrates prn  Oxygen therapy to maintain sats > 92 %  Lipid panel  2 D ECHO pending

## 2019-08-08 NOTE — ASSESSMENT & PLAN NOTE
Atypical chest pain likely from conversion to A-fib with RVR after albuterol treatment.   Chest pain resolved after converting to NSR with IV lopressor and Cardizem   Troponin 0.078, 0.081, 0.074, 0.079. Troponin flat and chronically elevated  Nonobstructive CAD on cath in Nov 2016  Chest xray unremarkable  Repeat Echo pending

## 2019-08-08 NOTE — H&P
[facility] Hospital Medicine  History & Physical    Patient Name: Crow Green  MRN: [id number]  Admission Date: 8/7/2019  Attending Physician: Brendan Chavez MD   Primary Care Provider: Mateo Lambert DO         Patient information was obtained from patient and ER records.     Subjective:     Principal Problem: Atrial fibrillation w/ RVR     Chief Complaint:   Chief Complaint   Patient presents with    Chest Pain     c/o CP, referred to ED by Dr. Alicea with A-fib w/RVR        HPI:  Crow Green is a 64 y.o. male patient with a PMHx of Afib, asthma, CHF, DM, Sleep apnea, Depression, and HTN who was referred to the ED by Dr. Alicea (Pulmonology) for evaluation of C hest Pain.  Pt described pain as mid-sternal chest pressure rated 10/10 which onset suddenly  in clinic during PFTs.   A rapid response was called and pt reported mild improvement after NTG 0.4 SL given.  EKG in clinic showed Afib with RVR with metoprolol 5 mg IV received. Symptoms are constant and moderate in severity.  No mitigating or exacerbating factors reported. Associated symptoms include: HA, palpitations, dizziness, diaphoresis, and nausea.  Patient denies any fever, chills, leg swelling, cough, SOB, extremity numbness, weakness, vomiting, and all other sxs at this time. No further complaints or concerns at this time.  Pt states he has been out of 3-4 medications but does note know the names.  Pt is followed outpatient by Dr. HEMANT Lambert (PCP) and does not know the name of his Cardiologist.  Pt is a full code and Anita Jones (daughter) at [phone] is the surrogate decision maker.  ER work up showed: BUN/Creatinine 37/1.5, Glucose 59, , and Troponin 0.078.  Chest xray unremarkable.  EKG showed Afib w/RVR .  Cardiology consulted with Hospital Medicine contacted for admission.     Past Medical History:   Diagnosis Date    Acute kidney injury     Arthritis     Asthma     Atrial fibrillation      CHF (congestive heart failure)     Depression     Diabetes mellitus, type 2 Diagnosed in 2000    Elevated PSA     Hypertension     Sleep apnea        Past Surgical History:   Procedure Laterality Date    BIOPSY-ARTERY-TEMPORAL Left 9/1/2017    Performed by Torin Bishop MD at [facility] OR    CHOLECYSTECTOMY      CHOLECYSTECTOMY-LAPAROSCOPIC N/A 3/17/2017    Performed by Louis O. Jeansonne IV, MD at [facility] OR       Review of patient's allergies indicates:  No Known Allergies    No current facility-administered medications on file prior to encounter.      Current Outpatient Medications on File Prior to Encounter   Medication Sig    aspirin (ECOTRIN) 81 MG EC tablet Take 1 tablet (81 mg total) by mouth once daily.    ALCOHOL ANTISEPTIC PADS (ALCOHOL PREP PADS TOP)     amLODIPine (NORVASC) 10 MG tablet TAKE 1 TABLET ONE TIME DAILY    atorvastatin (LIPITOR) 40 MG tablet Take 1 tablet (40 mg total) by mouth every evening.    azaTHIOprine (IMURAN) 50 mg Tab Take 1 tablet (50 mg total) by mouth once daily.    baclofen (LIORESAL) 10 MG tablet Take 1 tablet (10 mg total) by mouth once daily.    blood-glucose meter (TRUE METRIX AIR GLUCOSE METER) kit TEST FOUR TIMES DAILY BEFORE MEALS  AND EVERY NIGHT    ELIQUIS 5 mg Tab     fluticasone-salmeterol 250-50 mcg/dose (ADVAIR DISKUS) 250-50 mcg/dose diskus inhaler Inhale 1 puff into the lungs 2 (two) times daily. Controller    furosemide (LASIX) 40 MG tablet Take 2 tablets (80 mg total) by mouth every morning AND 1 tablet (40 mg total) every evening.    gabapentin (NEURONTIN) 800 MG tablet Take 1 tablet (800 mg total) by mouth 3 (three) times daily.    glimepiride (AMARYL) 2 MG tablet Take 1 tablet (2 mg total) by mouth before breakfast.    inhalation spacing device Use as directed for inhalation.    insulin (LANTUS SOLOSTAR U-100 INSULIN) glargine 100 units/mL (3mL) SubQ pen Inject 80 Units into the skin every evening.    insulin aspart U-100 (NOVOLOG FLEXPEN  "U-100 INSULIN) 100 unit/mL (3 mL) InPn pen INJECT 20 UNITS SUBCUTANEOUSLY THREE TIMES DAILY WITH MEALS    insulin syringe-needle U-100 1 mL 31 gauge x 5/16 Syrg     ipratropium-albuterol (COMBIVENT)  mcg/actuation inhaler Inhale 1 puff into the lungs 4 (four) times daily. Rescue    isosorbide mononitrate (IMDUR) 60 MG 24 hr tablet Take 1 tablet (60 mg total) by mouth once daily.    lancets 32 gauge Misc 1 lancet by Misc.(Non-Drug; Combo Route) route 2 (two) times daily.    latanoprost 0.005 % ophthalmic solution Place 1 drop into both eyes every evening.    lisinopril 10 MG tablet Take 1 tablet (10 mg total) by mouth once daily.    metFORMIN (GLUCOPHAGE) 1000 MG tablet Take 1 tablet (1,000 mg total) by mouth 2 (two) times daily with meals.    metOLazone (ZAROXOLYN) 2.5 MG tablet Take 1 tablet (2.5 mg total) by mouth once daily.    metoprolol succinate (TOPROL-XL) 50 MG 24 hr tablet TAKE 1 TABLET EVERY DAY    nitroGLYCERIN (NITROSTAT) 0.3 MG SL tablet PLACE 1 TABLET UNDER THE TONGUE EVERY 5 MINUTES AS NEEDED FOR CHEST PAIN, NO MORE THAN 3 TABLETS IN ONE DAY    pantoprazole (PROTONIX) 40 MG tablet Take 1 tablet (40 mg total) by mouth once daily.    pen needle, diabetic (BD ULTRA-FINE SHORT PEN NEEDLE) 31 gauge x 5/16" Ndle Inject 1 each into the skin 3 (three) times daily.    predniSONE (DELTASONE) 20 MG tablet Take 1 tablet (20 mg total) by mouth once daily.    semaglutide (OZEMPIC) 0.25 mg or 0.5 mg(2 mg/1.5 mL) PnIj Inject 0.25 mg into the skin every 7 days.    sertraline (ZOLOFT) 50 MG tablet TAKE 1 TABLET ONE TIME DAILY    tiotropium (SPIRIVA WITH HANDIHALER) 18 mcg inhalation capsule Inhale 1 capsule (18 mcg total) into the lungs once daily. Controller    traZODone (DESYREL) 150 MG tablet TAKE 1 TABLET EVERY NIGHT    VENTOLIN HFA 90 mcg/actuation inhaler Inhale 2 puffs into the lungs every 4 (four) hours as needed for Wheezing.     Family History     Problem Relation (Age of Onset)    " Cataracts Mother, Father, Sister    Glaucoma Mother, Sister, Maternal Aunt    No Known Problems Brother, Daughter, Son        Tobacco Use    Smoking status: Never Smoker    Smokeless tobacco: Never Used   Substance and Sexual Activity    Alcohol use: No    Drug use: No    Sexual activity: Not Currently     Review of Systems   Constitutional: Positive for activity change and diaphoresis. Negative for appetite change, fatigue and fever.   HENT: Negative for congestion, postnasal drip, sinus pressure, sore throat and trouble swallowing.    Eyes: Negative.    Respiratory: Negative for cough, shortness of breath and wheezing.    Cardiovascular: Positive for chest pain and palpitations.   Gastrointestinal: Positive for nausea. Negative for abdominal distention, abdominal pain, diarrhea and vomiting.   Endocrine: Negative for cold intolerance and heat intolerance.   Genitourinary: Negative for difficulty urinating, dysuria, flank pain, frequency and urgency.   Musculoskeletal: Negative for arthralgias, back pain and myalgias.   Skin: Negative for color change and wound.   Allergic/Immunologic: Negative for environmental allergies and food allergies.   Neurological: Positive for dizziness and headaches. Negative for syncope and weakness.   Hematological: Does not bruise/bleed easily.   Psychiatric/Behavioral: Negative for agitation, confusion and sleep disturbance. The patient is not nervous/anxious.      Objective:     Vital Signs (Most Recent):  Temp: 97.3 °F (36.3 °C) (08/07/19 1953)  Pulse: 72 (08/07/19 1953)  Resp: 16 (08/07/19 1953)  BP: 103/63 (08/07/19 1953)  SpO2: 96 % (08/07/19 1953) Vital Signs (24h Range):  Temp:  [96.9 °F (36.1 °C)-98.1 °F (36.7 °C)] 97.3 °F (36.3 °C)  Pulse:  [] 72  Resp:  [16-23] 16  SpO2:  [94 %-98 %] 96 %  BP: ()/(50-80) 103/63     Weight: 87.2 kg (192 lb 3.9 oz)  Body mass index is 31.99 kg/m².    Physical Exam   Constitutional: He is oriented to person, place, and time.  He appears well-developed and well-nourished.   HENT:   Head: Normocephalic.   Nose: Nose normal.   Mouth/Throat: Oropharynx is clear and moist.   Eyes: Conjunctivae are normal.   Neck: Normal range of motion. Neck supple.   Cardiovascular: Normal rate and intact distal pulses. An irregularly irregular rhythm present.   Pulses:       Dorsalis pedis pulses are 2+ on the right side, and 2+ on the left side.        Posterior tibial pulses are 2+ on the right side, and 2+ on the left side.   Pulmonary/Chest: Effort normal and breath sounds normal. No respiratory distress. He has no wheezes.   Pain to chest reproducible with palpation    Abdominal: Soft. Bowel sounds are normal. He exhibits no distension. There is no tenderness.   Genitourinary:   Genitourinary Comments: Deferred    Musculoskeletal: Normal range of motion.   Neurological: He is alert and oriented to person, place, and time.   Skin: Skin is warm and dry.   Psychiatric: He has a normal mood and affect. His behavior is normal.           Significant Labs:   CBC:   Recent Labs   Lab 08/07/19  1125 08/07/19  1408   WBC 12.23 16.06*   HGB 13.2* 13.2*   HCT 41.6 41.4    194     CMP:   Recent Labs   Lab 08/07/19  1408      K 3.7      CO2 27   GLU 59*   BUN 37*   CREATININE 1.5*   CALCIUM 9.5   PROT 7.1   ALBUMIN 3.5   BILITOT 0.5   ALKPHOS 61   AST 17   ALT 25   ANIONGAP 10   EGFRNONAA 49*     Magnesium:   Recent Labs   Lab 08/07/19  1408   MG 1.7     Troponin:   Recent Labs   Lab 08/07/19  1408 08/07/19  1645   TROPONINI 0.078* 0.081*     TSH:   Recent Labs   Lab 08/07/19  1645   TSH 1.781       Significant Imaging:   Imaging Results          X-Ray Chest AP Portable (Final result)  Result time 08/07/19 14:14:04    Final result by Jayjay De La Rosa MD (08/07/19 14:14:04)                 Impression:      No acute abnormality.      Electronically signed by: Jayjay De La Rosa  Date:    08/07/2019  Time:    14:14             Narrative:    EXAMINATION:  XR  CHEST AP PORTABLE    CLINICAL HISTORY:  Chest Pain;.    TECHNIQUE:  Single frontal portable view of the chest was performed.    COMPARISON:  07/24/2019    FINDINGS:  Support devices: None    The lungs are clear, with normal appearance of pulmonary vasculature and no pleural effusion or pneumothorax.    The cardiac silhouette is normal in size. The hilar and mediastinal contours are unremarkable.    Bones are intact.                              Assessment/Plan:     JOSE MARIA (acute kidney injury)  -Creatinine mildly elevated   -nephrotoxic medications held   -gentle hydration       Atrial fibrillation with RVR  -Cardiology consulted   -Cardizem infusion- rate controlled- will transfer to oral in am   -Lopressor continued   -Echo results pending   -will follow serial enzymes      Chest pain  -Pt admitted to Observation Unit   -in the setting of Afib w/RVR  -Cardiology consult  -Eliquis continued   Serial cardiac enzymes  -beta blocker ,ASA, Imdur, and statin continued   -ACE held due to JOSE MARIA  Nitrates prn  Oxygen therapy to maintain sats > 92 %  Lipid panel  2 D ECHO pending         Elevated troponin  -initial troponin 0.081  -likely due to demand   -will follow serial results       Moderate asthma  -Duonebs as needed   -nebulizer treatments       SANDRITA on CPAP  -CPAP nightly      Chronic systolic congestive heart failure  -appears compensated   -Lopressor continued   -will resume Lasix  -  -Echo results pending       Coronary artery disease of native artery of native heart with stable angina pectoris  -ASA, Statin, Eliquis, and Lopressor continued   -will follow serial troponin results    Hypertension associated with diabetes  -soft on Cardizem infusion   -Lopressor continued    -nephrotoxic meds held due to JOSE MARIA   -Accuchecks and SSI   -HbA1c pending     Leukocytosis   -likely reactive   -chest xray unremarkable  -pt afebrile   -UA pending   -repeat CBC in am     VTE Risk Mitigation (From admission, onward)         Ordered     enoxaparin injection 40 mg  Daily      08/07/19 1946     Place sequential compression device  Until discontinued      08/07/19 1946     Place MARCIA hose  Until discontinued      08/07/19 1946             Annabelle Choudhary NP  Department of Hospital Medicine   [facility]

## 2019-08-08 NOTE — SUBJECTIVE & OBJECTIVE
Past Medical History:   Diagnosis Date    Acute kidney injury     Arthritis     Asthma     Atrial fibrillation     CHF (congestive heart failure)     Depression     Diabetes mellitus, type 2 Diagnosed in 2000    Elevated PSA     Hypertension     Sleep apnea        Past Surgical History:   Procedure Laterality Date    BIOPSY-ARTERY-TEMPORAL Left 9/1/2017    Performed by Torin Bishop MD at [facility] OR    CHOLECYSTECTOMY      CHOLECYSTECTOMY-LAPAROSCOPIC N/A 3/17/2017    Performed by Louis O. Jeansonne IV, MD at [facility] OR       Review of patient's allergies indicates:  No Known Allergies    No current facility-administered medications on file prior to encounter.      Current Outpatient Medications on File Prior to Encounter   Medication Sig    aspirin (ECOTRIN) 81 MG EC tablet Take 1 tablet (81 mg total) by mouth once daily.    ALCOHOL ANTISEPTIC PADS (ALCOHOL PREP PADS TOP)     amLODIPine (NORVASC) 10 MG tablet TAKE 1 TABLET ONE TIME DAILY    atorvastatin (LIPITOR) 40 MG tablet Take 1 tablet (40 mg total) by mouth every evening.    azaTHIOprine (IMURAN) 50 mg Tab Take 1 tablet (50 mg total) by mouth once daily.    baclofen (LIORESAL) 10 MG tablet Take 1 tablet (10 mg total) by mouth once daily.    blood-glucose meter (TRUE METRIX AIR GLUCOSE METER) kit TEST FOUR TIMES DAILY BEFORE MEALS  AND EVERY NIGHT    ELIQUIS 5 mg Tab     fluticasone-salmeterol 250-50 mcg/dose (ADVAIR DISKUS) 250-50 mcg/dose diskus inhaler Inhale 1 puff into the lungs 2 (two) times daily. Controller    furosemide (LASIX) 40 MG tablet Take 2 tablets (80 mg total) by mouth every morning AND 1 tablet (40 mg total) every evening.    gabapentin (NEURONTIN) 800 MG tablet Take 1 tablet (800 mg total) by mouth 3 (three) times daily.    glimepiride (AMARYL) 2 MG tablet Take 1 tablet (2 mg total) by mouth before breakfast.    inhalation spacing device Use as directed for inhalation.    insulin (LANTUS SOLOSTAR U-100 INSULIN) glargine  "100 units/mL (3mL) SubQ pen Inject 80 Units into the skin every evening.    insulin aspart U-100 (NOVOLOG FLEXPEN U-100 INSULIN) 100 unit/mL (3 mL) InPn pen INJECT 20 UNITS SUBCUTANEOUSLY THREE TIMES DAILY WITH MEALS    insulin syringe-needle U-100 1 mL 31 gauge x 5/16 Syrg     ipratropium-albuterol (COMBIVENT)  mcg/actuation inhaler Inhale 1 puff into the lungs 4 (four) times daily. Rescue    isosorbide mononitrate (IMDUR) 60 MG 24 hr tablet Take 1 tablet (60 mg total) by mouth once daily.    lancets 32 gauge Misc 1 lancet by Misc.(Non-Drug; Combo Route) route 2 (two) times daily.    latanoprost 0.005 % ophthalmic solution Place 1 drop into both eyes every evening.    lisinopril 10 MG tablet Take 1 tablet (10 mg total) by mouth once daily.    metFORMIN (GLUCOPHAGE) 1000 MG tablet Take 1 tablet (1,000 mg total) by mouth 2 (two) times daily with meals.    metOLazone (ZAROXOLYN) 2.5 MG tablet Take 1 tablet (2.5 mg total) by mouth once daily.    metoprolol succinate (TOPROL-XL) 50 MG 24 hr tablet TAKE 1 TABLET EVERY DAY    nitroGLYCERIN (NITROSTAT) 0.3 MG SL tablet PLACE 1 TABLET UNDER THE TONGUE EVERY 5 MINUTES AS NEEDED FOR CHEST PAIN, NO MORE THAN 3 TABLETS IN ONE DAY    pantoprazole (PROTONIX) 40 MG tablet Take 1 tablet (40 mg total) by mouth once daily.    pen needle, diabetic (BD ULTRA-FINE SHORT PEN NEEDLE) 31 gauge x 5/16" Ndle Inject 1 each into the skin 3 (three) times daily.    predniSONE (DELTASONE) 20 MG tablet Take 1 tablet (20 mg total) by mouth once daily.    semaglutide (OZEMPIC) 0.25 mg or 0.5 mg(2 mg/1.5 mL) PnIj Inject 0.25 mg into the skin every 7 days.    sertraline (ZOLOFT) 50 MG tablet TAKE 1 TABLET ONE TIME DAILY    tiotropium (SPIRIVA WITH HANDIHALER) 18 mcg inhalation capsule Inhale 1 capsule (18 mcg total) into the lungs once daily. Controller    traZODone (DESYREL) 150 MG tablet TAKE 1 TABLET EVERY NIGHT    VENTOLIN HFA 90 mcg/actuation inhaler Inhale 2 puffs into " the lungs every 4 (four) hours as needed for Wheezing.     Family History     Problem Relation (Age of Onset)    Cataracts Mother, Father, Sister    Glaucoma Mother, Sister, Maternal Aunt    No Known Problems Brother, Daughter, Son        Tobacco Use    Smoking status: Never Smoker    Smokeless tobacco: Never Used   Substance and Sexual Activity    Alcohol use: No    Drug use: No    Sexual activity: Not Currently     Review of Systems   Constitution: Negative for diaphoresis, malaise/fatigue, weight gain and weight loss.   HENT: Negative for congestion and nosebleeds.    Cardiovascular: Positive for chest pain and palpitations. Negative for claudication, cyanosis, dyspnea on exertion, irregular heartbeat, leg swelling, near-syncope, orthopnea, paroxysmal nocturnal dyspnea and syncope.   Respiratory: Negative for cough, hemoptysis, shortness of breath, sleep disturbances due to breathing, snoring, sputum production and wheezing.    Hematologic/Lymphatic: Negative for bleeding problem. Does not bruise/bleed easily.   Skin: Negative for rash.   Musculoskeletal: Negative for arthritis, back pain, falls, joint pain, muscle cramps and muscle weakness.   Gastrointestinal: Negative for abdominal pain, constipation, diarrhea, heartburn, hematemesis, hematochezia, melena, nausea and vomiting.   Genitourinary: Negative for dysuria, hematuria and nocturia.   Neurological: Negative for excessive daytime sleepiness, dizziness, headaches, light-headedness, loss of balance, numbness, vertigo and weakness.     Objective:     Vital Signs (Most Recent):  Temp: 97.4 °F (36.3 °C) (08/08/19 0733)  Pulse: 71 (08/08/19 0733)  Resp: 20 (08/08/19 0733)  BP: 112/69 (08/08/19 0733)  SpO2: 98 % (08/08/19 0733) Vital Signs (24h Range):  Temp:  [96.9 °F (36.1 °C)-98.2 °F (36.8 °C)] 97.4 °F (36.3 °C)  Pulse:  [] 71  Resp:  [16-23] 20  SpO2:  [94 %-98 %] 98 %  BP: ()/(50-80) 112/69     Weight: 87.2 kg (192 lb 3.9 oz)  Body mass  index is 31.99 kg/m².    SpO2: 98 %  O2 Device (Oxygen Therapy): room air      Intake/Output Summary (Last 24 hours) at 8/8/2019 1014  Last data filed at 8/8/2019 0600  Gross per 24 hour   Intake 1552.91 ml   Output 650 ml   Net 902.91 ml       Lines/Drains/Airways     Peripheral Intravenous Line                 Peripheral IV - Single Lumen 08/07/19 1328 24 G Right Hand less than 1 day         Peripheral IV - Single Lumen 08/07/19 1341 20 G Left Forearm less than 1 day                Physical Exam   Constitutional: He is oriented to person, place, and time. He appears well-developed and well-nourished.   Neck: Neck supple. No JVD present.   Cardiovascular: Normal rate, regular rhythm, normal heart sounds and normal pulses. Exam reveals no friction rub.   No murmur heard.  Pulmonary/Chest: Effort normal and breath sounds normal. No respiratory distress. He has no wheezes. He has no rales.   Abdominal: Soft. Bowel sounds are normal. He exhibits no distension.   Musculoskeletal: He exhibits no edema or tenderness.   Neurological: He is alert and oriented to person, place, and time.   Skin: Skin is warm and dry. No rash noted.   Psychiatric: He has a normal mood and affect. His behavior is normal.   Nursing note and vitals reviewed.      Significant Labs:   All pertinent lab results from the last 24 hours have been reviewed. and   Recent Lab Results       08/08/19  0317   08/07/19  2137   08/07/19  2113   08/07/19  1645   08/07/19  1625        DLCOc SBVA LLN               DLCOc SBVA Pre Ref               DLCOc SBVA Ref               DLCOc Single Breath LLN               DLCOc Single Breath Pre Ref               DLCOc Single Breath Ref               DLCO Single Breath LLN               DLCO Single Breath Pre Ref               DLCO Single Breath Ref               DLCOVA LLN               DLCOVA PRE               DLCOVA Pre Ref               DLCOVA Ref               DLVAAdj PRE               ERV LLN               ERV Pre  Ref               ERV Ref               FEF 25 75 LLN               FEF 25 75 Pre Ref               FEF 25 75 Ref               FEV1 FVC LLN               FEV1 FVC Pre Ref               FEV1 FVC Ref               FEV1 LLN               FEV1 Pre Ref               FEV1 Ref               FRCpleth LLN               FRCpleth PreRef               FRCpleth Ref               FVC LLN               FVC Pre Ref               FVC Ref               IVC PRE               IVC Single Breath LLN               IVC Single Breath Pre Ref               IVC Single Breath Ref               MVV LLN               MVV Pre Ref               MVV Ref               PEF LLN               PEF Pre Ref               PEF Ref               Raw LLN               Raw PRE               Raw Pre Ref               Raw Ref               RV LLN               RV Pre Ref               RV Ref               RVTLC LLN               RVTLC PRE               RVTLC Pre Ref               RVTLC Ref               TLC LLN               TLC Pre Ref               TLC Ref               VA PRE               VA Single Breath LLN               VA Single Breath Pre Ref               VA Single Breath Ref               VC LLN               VC PRE               VC Pre Ref               VC Ref               Albumin               Alkaline Phosphatase               ALT               Anion Gap 8             Aniso               aPTT 24.3  Comment:  aPTT therapeutic range = 39-69 seconds             AST               Baso # 0.02             Basophil% 0.2             BILIRUBIN TOTAL               BNP               Interpretation               BUN, Bld 29             Calcium 9.0             Chloride 105             CO2 28             Creatinine 1.1             D-Dimer               Differential Method Automated             DLCO ADJ PRE               eGFR if  >60             eGFR if non  >60  Comment:  Calculation used to obtain the estimated glomerular  filtration  rate (eGFR) is the CKD-EPI equation.                Eos # 0.1             Eosinophil% 0.9             Large/Giant Platelets               Glucose 76             Gran # (ANC) 5.5             Gran% 47.5             Hematocrit 35.6             Hemoglobin 11.5             Hypo               Immature Grans (Abs)               Immature Granulocytes               Coumadin Monitoring INR 1.0  Comment:  Coumadin Therapy:  2.0 - 3.0 for INR for all indicators except mechanical heart valves  and antiphospholipid syndromes which should use 2.5 - 3.5.               Lymph # 5.2             Lymph% 44.5             Magnesium 1.5             MCH 28.5             MCHC 32.3             MCV 88             Mono # 0.8             Mono% 7.2             MPV 10.3             nRBC               Ovalocytes               Phosphorus 3.6             Platelet Estimate               Platelets 194             POCT Glucose     108   80     Poik               Potassium 3.7             Pre DLCO               Pre ERV               Pre FEF 25 75               Pre                Pre FEV1               Pre FEV1 FVC               Pre FRC PL               Pre FVC               Pre MVV               Pre PEF               Pre RV               Pre TLC               PROTEIN TOTAL               Protime 10.4             RBC 4.04             RDW 14.4             Sodium 141             Spherocytes               Tear Drop Cells               Troponin I 0.079  Comment:  The reference interval for Troponin I represents the 99th percentile   cutoff   for our facility and is consistent with 3rd generation assay   performance.   0.074  Comment:  The reference interval for Troponin I represents the 99th percentile   cutoff   for our facility and is consistent with 3rd generation assay   performance.     0.081  Comment:  The reference interval for Troponin I represents the 99th percentile   cutoff   for our facility and is consistent with 3rd generation  assay   performance.         TSH       1.781       VTGRAWPRE               WBC 11.60                              08/07/19  1447   08/07/19  1429   08/07/19  1408   08/07/19  1329   08/07/19  1125        DLCOc SBVA LLN       2.65       DLCOc SBVA Pre Ref       151.2       DLCOc SBVA Ref       4.02       DLCOc Single Breath LLN       17.59       DLCOc Single Breath Pre Ref       49.0       DLCOc Single Breath Ref       24.51       DLCO Single Breath LLN       17.59       DLCO Single Breath Pre Ref       45.5       DLCO Single Breath Ref       24.51       DLCOVA LLN       2.65       DLCOVA PRE       5.64       DLCOVA Pre Ref       140.4       DLCOVA Ref       4.02       DLVAAdj PRE       6.07       ERV LLN       1.03       ERV Pre Ref       60.3       ERV Ref       1.03       FEF 25 75 LLN       0.90       FEF 25 75 Pre Ref       104.2       FEF 25 75 Ref       2.20       FEV1 FVC LLN       67       FEV1 FVC Pre Ref       103.7       FEV1 FVC Ref       79       FEV1 LLN       1.82       FEV1 Pre Ref       72.8       FEV1 Ref       2.54       FRCpleth LLN       2.34       FRCpleth PreRef       63.8       FRCpleth Ref       3.33       FVC LLN       2.38       FVC Pre Ref       70.3       FVC Ref       3.22       IVC PRE       1.17       IVC Single Breath LLN       2.38       IVC Single Breath Pre Ref       36.3       IVC Single Breath Ref       3.22       MVV LLN       95       MVV Pre Ref       49.9       MVV Ref       112       PEF LLN       4.85       PEF Pre Ref       70.9       PEF Ref       7.14       Raw LLN       3.06       Raw PRE       3.70       Raw Pre Ref       120.9       Raw Ref       3.06       RV LLN       1.62       RV Pre Ref       58.9       RV Ref       2.30       RVTLC LLN       29       RVTLC PRE       36.52       RVTLC Pre Ref       95.7       RVTLC Ref       38       TLC LLN       4.95       TLC Pre Ref       60.7       TLC Ref       6.10       VA PRE       2.02       VA Single Breath LLN        5.95       VA Single Breath Pre Ref       34.0       VA Single Breath Ref       5.95       VC LLN       2.38       VC PRE       2.35       VC Pre Ref       73.1       VC Ref       3.22       Albumin     3.5         Alkaline Phosphatase     61         ALT     25         Anion Gap     10         Aniso     Slight         aPTT               AST     17         Baso #     0.01   0.02     Basophil%     0.1   0.2     BILIRUBIN TOTAL     0.5  Comment:  For infants and newborns, interpretation of results should be based  on gestational age, weight and in agreement with clinical  observations.  Premature Infant recommended reference ranges:  Up to 24 hours.............<8.0 mg/dL  Up to 48 hours............<12.0 mg/dL  3-5 days..................<15.0 mg/dL  6-29 days.................<15.0 mg/dL           BNP     137  Comment:  Values of less than 100 pg/ml are consistent with non-CHF populations.         Interpretation         Normal spirometry. (FEV1/VC greater than or equal to LLN and FVC greater than or equal to LLN)  Normal airflow. (FEV1/VC greater than or equal to LLN)  Moderate restriction.(TLC> or equal to 60% predicted and < 70% predicted)  Moderate reduction in diffusion capacity - adjusted for hemoglobin. (DLCO > or equal to 40% and < 60% predicted).   Adjusted for alveolar volume DLCO was normal.  Since 05/28/2019: FEV1, FVC and DLCO have declined.         BUN, Bld     37         Calcium     9.5         Chloride     103         CO2     27         Creatinine     1.5         D-Dimer     <0.19  Comment:  The quantitative D-dimer assay should be used as an aid in   the diagnosis of deep vein thrombosis and pulmonary embolism  in patients with the appropriate presentation and clinical  history. The upper limit of the reference interval and the clinical   cut off   point are identical. Causes of a positive (>0.50 mg/L FEU) D-Dimer   test  include, but are not limited to: DVT, PE, DIC, thrombolytic   therapy, anticoagulant  therapy, recent surgery, trauma, or   pregnancy, disseminated malignancy, aortic aneurysm, cirrhosis,  and severe infection. False negative results may occur in   patients with distal DVT.           Differential Method     Automated   Automated     DLCO ADJ PRE       12.02       eGFR if      57         eGFR if non      49  Comment:  Calculation used to obtain the estimated glomerular filtration  rate (eGFR) is the CKD-EPI equation.            Eos #     0.1   0.0     Eosinophil%     0.4   0.1     Large/Giant Platelets     Present         Glucose     59         Gran # (ANC)     8.4   8.0     Gran%     52.0   65.7     Hematocrit     41.4   41.6     Hemoglobin     13.2   13.2     Hypo     Occasional         Immature Grans (Abs)         0.03  Comment:  Mild elevation in immature granulocytes is non specific and   can be seen in a variety of conditions including stress response,   acute inflammation, trauma and pregnancy. Correlation with other   laboratory and clinical findings is essential.       Immature Granulocytes         0.2     Coumadin Monitoring INR               Lymph #     5.9   3.3     Lymph%     36.9   27.0     Magnesium     1.7         MCH     28.4   28.1     MCHC     31.9   31.7     MCV     89   89     Mono #     1.7   0.9     Mono%     10.6   7.0     MPV     12.0   9.3     nRBC         0     Ovalocytes     Occasional         Phosphorus               Platelet Estimate     Appears normal         Platelets     194   260     POCT Glucose 213 50           Poik     Slight         Potassium     3.7         Pre DLCO       11.16       Pre ERV       0.62       Pre FEF 25 75       2.29       Pre        7.71       Pre FEV1       1.85       Pre FEV1 FVC       81.62       Pre FRC PL       2.13       Pre FVC       2.26       Pre MVV       56.00       Pre PEF       5.06       Pre RV       1.35       Pre TLC       3.71       PROTEIN TOTAL     7.1         Protime               RBC      4.65   4.70     RDW     14.0   13.8     Sodium     140         Spherocytes     Occasional         Tear Drop Cells     Occasional         Troponin I     0.078  Comment:  The reference interval for Troponin I represents the 99th percentile   cutoff   for our facility and is consistent with 3rd generation assay   performance.           TSH               VTGRAWPRE       2.04       WBC     16.06   12.23                          Significant Imaging: Echocardiogram:   2D echo with color flow doppler:   Results for orders placed or performed during the hospital encounter of 07/12/17   2D echo with color flow doppler   Result Value Ref Range    QEF 45 55 - 65    Diastolic Dysfunction Yes (A)     Narrative    Date of Procedure: 07/12/2017        TEST DESCRIPTION   Technical Quality: This is a technically challenging study. There is poor endocardial definition.     Aorta: The aortic root is normal in size, measuring 2.9 cm at sinotubular junction and 2.9 cm at Sinuses of Valsalva. The proximal ascending aorta is normal in size, measuring 3.2 cm across.     Left Atrium: The left atrial volume index is moderately enlarged, measuring 44.40 cc/m2.     Left Ventricle: The left ventricle is normal in size, with an end-diastolic diameter of 4.6 cm, and an end-systolic diameter of 3.7 cm. Septal wall thickness is mildly increased, and posterior wall thickness is increased, with the septum measuring 1.4 cm   and the posterior wall measuring 1.8 cm across. Relative wall thickness was increased at 0.78, and the LV mass index was increased at 174.5 g/m2 consistent with concentric left ventricular hypertrophy. There are no regional wall motion abnormalities.   Left ventricular systolic function appears mildly depressed. Visually estimated ejection fraction is 45%. The LV Doppler derived stroke volume equals 60.0 ccs.     Diastolic indices: E wave velocity 0.9 m/s, E/A ratio 0.9,  msec., E/e' ratio(avg) 15. There is  pseudonormalization of mitral inflow pattern consistent with significant diastolic dysfunction.     Right Atrium: The right atrium is normal in size, measuring 4.5 cm in length and 4.0 cm in width in the apical view.     Right Ventricle: The right ventricle is normal in size. Global right ventricular systolic function appears normal. Tricuspid annular plane systolic excursion (TAPSE) is 1.8 cm.     Aortic Valve:  The aortic valve is normal in structure. The mean gradient obtained across the aortic valve is 4 mmHg.     Mitral Valve:  The mitral valve is normal in structure. The pressure half time is 60 msec. The calculated mitral valve area is 3.67 cm2. There is mitral annular calcification.     Tricuspid Valve:  The tricuspid valve is normal in structure.     Pulmonary Valve:  The pulmonic valve is not well seen.     IVC: IVC is normal in size and collapses > 50% with a sniff, suggesting normal right atrial pressure of 3 mmHg.     Intracavitary: There is no evidence of pericardial effusion, intracavity mass, thrombi, or vegetation.         CONCLUSIONS     1 - Mildly depressed left ventricular systolic function (EF 45%).     2 - Impaired LV relaxation, elevated LAP (grade 2 diastolic dysfunction).     3 - Normal right ventricular systolic function .     4 - Moderate left atrial enlargement.     5 - Concentric hypertrophy.             This document has been electronically    SIGNED BY: Brandon Pena MD On: 07/12/2017 14:51

## 2019-08-08 NOTE — ASSESSMENT & PLAN NOTE
-likely reactive   -chest xray negative  -pt afebrile   -UA results pending   -repeat CBC in am

## 2019-08-08 NOTE — PLAN OF CARE
Problem: Adult Inpatient Plan of Care  Goal: Plan of Care Review  Outcome: Ongoing (interventions implemented as appropriate)  Pt AAO x4.  VSS  Pt able to make needs known.  Pt remained afebrile throughout this shift.   Pt ambulates in room, gait steady   Pt remained free of falls this shift.   Pt denies pain this shift.  Plan of care reviewed. Patient verbalizes understanding.   Pt moving/turing independent. Frequent weight shifting encouraged.  Patient atrial fibrillation on monitor.   Bed low, side rails up x 2, wheels locked, call light in reach.   Hourly rounding completed.   Will continue to monitor.

## 2019-08-08 NOTE — ASSESSMENT & PLAN NOTE
Patient has converted to NSR with Cardizem gtt and IV Lopressor   Recommend stopping Cardizem gtt and increase metoprolol to 50mg BID  Continue Eliquis  A-fib likely triggered by albuterol  Recommend avoiding albuterol in the future.   OP EP referral for PAF

## 2019-08-08 NOTE — ASSESSMENT & PLAN NOTE
-Cardiology consulted   -Cardizem infusion- rate controlled- will transfer to oral in am   -Lopressor continued   -Echo results pending   -will follow serial enzymes

## 2019-08-08 NOTE — HPI
Crow Green is a 64 y.o. male patient with a PMHx of Afib, asthma, CHF, DM, Sleep apnea, Depression, and HTN who was referred to the ED by Dr. Alicea (Pulmonology) for evaluation of Chest Pain.  Pt described pain as mid-sternal chest pressure rated 10/10 which onset suddenly in clinic during PFTs.   A rapid response was called and pt reported mild improvement after NTG 0.4 SL given.  EKG in clinic showed Afib with RVR with metoprolol 5 mg IV received. Symptoms are constant and moderate in severity.  No mitigating or exacerbating factors reported. Associated symptoms include: HA, palpitations, dizziness, diaphoresis, and nausea.  Patient denies any fever, chills, leg swelling, cough, SOB, extremity numbness, weakness, vomiting, and all other sxs at this time. No further complaints or concerns at this time.  Pt states he has been out of 3-4 medications but does note know the names.  Pt is followed outpatient by Dr. HEMANT Lambert (PCP) and does not know the name of his Cardiologist.  Pt is a full code and Anita Jones (daughter) at [phone] is the surrogate decision maker.  ER work up showed: BUN/Creatinine 37/1.5, Glucose 59, , and Troponin 0.078.  Chest xray unremarkable.  EKG showed Afib w/RVR .  Cardiology consulted with Hospital Medicine contacted for admission.

## 2019-08-08 NOTE — NURSING
Discharged order received and reviewed with patient and family. Patient instructed when to take each medication next dose. IV removed, tele removed. Patient assisted with dressing by staff. Patient transported to front lobby via wheelchair by staff for family to transport home.

## 2019-08-08 NOTE — ASSESSMENT & PLAN NOTE
-soft on Cardizem infusion   -Lopressor continued    -nephrotoxic meds held due to JOSE MARIA   -Accuchecks and SSI   -HbA1c pending

## 2019-08-08 NOTE — PLAN OF CARE
Informed patient of observation status , advised patient status may change per provider if needed . Patient understand ,signed , and received written notification . Informed patient of financial services contact number [phone] if needed. No further action taken .

## 2019-08-08 NOTE — DISCHARGE SUMMARY
[facility] Hospital Medicine  Discharge Summary      Patient Name: Crow Green  MRN: [id number]  Admission Date: 8/7/2019  Hospital Length of Stay: 0 days  Discharge Date and Time:  08/08/2019 1:23 PM  Attending Physician: Justice Cai MD   Discharging Provider: Asya Vela NP  Primary Care Provider: Mateo Lambert DO      HPI:    Crow Green is a 64 y.o. male patient with a PMHx of Afib, asthma, CHF, DM, Sleep apnea, Depression, and HTN who was referred to the ED by Dr. Alicea (Pulmonology) for evaluation of C hest Pain.  Pt described pain as mid-sternal chest pressure rated 10/10 which onset suddenly  in clinic during PFTs.   A rapid response was called and pt reported mild improvement after NTG 0.4 SL given.  EKG in clinic showed Afib with RVR with metoprolol 5 mg IV received. Symptoms are constant and moderate in severity.  No mitigating or exacerbating factors reported. Associated symptoms include: HA, palpitations, dizziness, diaphoresis, and nausea.  Patient denies any fever, chills, leg swelling, cough, SOB, extremity numbness, weakness, vomiting, and all other sxs at this time. No further complaints or concerns at this time.  Pt states he has been out of 3-4 medications but does note know the names.  Pt is followed outpatient by Dr. HEMANT Lambert (PCP) and does not know the name of his Cardiologist.  Pt is a full code and Anita Jones (daughter) at [phone] is the surrogate decision maker.  ER work up showed: BUN/Creatinine 37/1.5, Glucose 59, , and Troponin 0.078.  Chest xray unremarkable.  EKG showed Afib w/RVR .  Cardiology consulted with Hospital Medicine contacted for admission.     * No surgery found *      Hospital Course:   Crow Green is a 64 year old male who was admitted to [facility] for further evaluation of chest pain. He was noted in atrial fibrillation with RVR. He was given one dose of Metoprolol 5mg IV  X 1  and started on Cardizem infusion. Troponin chronically elevated and flat. Seen by Cardiology who stopped Cardizem infusion and increased metoprolol to 100mg BID. He will continue Eliquis for CVA prophylaxis.  Contributing factor factor of RVR was felt to be related to albuterol. This was discontinued. Echocardiogram unchanged from two years ago. Patient was asked to follow up with PCP and Cardiology in one week. Cardiology will make appointment and contact patient. Patient seen and examined on date of discharge and deemed suitable.     Consults:   Consults (From admission, onward)        Status Ordering Provider     Inpatient consult to Cardiology  Once     Provider:  Shawna Rojas NP    Completed KELSI FALK          Leukocytosis-resolved as of 8/8/2019  -likely reactive   -chest xray negative  -pt afebrile   -UA results pending   -repeat CBC in am         Final Active Diagnoses:    Diagnosis Date Noted POA    Atrial fibrillation with RVR [I48.91] 08/07/2019 Unknown    Elevated troponin [R74.8] 07/12/2017 Yes    SANDRITA on CPAP [G47.33, Z99.89] 01/12/2017 Not Applicable    Moderate asthma [J45.909] 01/12/2017 Yes    Coronary artery disease of native artery of native heart with stable angina pectoris [I25.118] 12/05/2016 Yes    Chronic systolic congestive heart failure [I50.22] 12/05/2016 Yes    Hypertension associated with diabetes [E11.59, I10] 02/27/2015 Yes      Problems Resolved During this Admission:    Diagnosis Date Noted Date Resolved POA    PRINCIPAL PROBLEM:  Chest pain [R07.9] 08/07/2019 08/08/2019 Yes    JOSE MARIA (acute kidney injury) [N17.9] 08/07/2019 08/08/2019 Unknown    Leukocytosis [D72.829] 08/07/2019 08/08/2019 Unknown       Discharged Condition: stable    Disposition: Home or Self Care    Follow Up:    Patient Instructions:      Activity as tolerated       Significant Diagnostic Studies: Labs:   CMP   Recent Labs   Lab 08/07/19  1408 08/08/19  0317    141   K 3.7 3.7    105   CO2  27 28   GLU 59* 76   BUN 37* 29*   CREATININE 1.5* 1.1   CALCIUM 9.5 9.0   PROT 7.1  --    ALBUMIN 3.5  --    BILITOT 0.5  --    ALKPHOS 61  --    AST 17  --    ALT 25  --    ANIONGAP 10 8   ESTGFRAFRICA 57* >60   EGFRNONAA 49* >60    and CBC   Recent Labs   Lab 08/07/19  1125 08/07/19  1408 08/08/19  0317   WBC 12.23 16.06* 11.60   HGB 13.2* 13.2* 11.5*   HCT 41.6 41.4 35.6*    194 194       Pending Diagnostic Studies:     None         Medications:  Reconciled Home Medications:      Medication List      CHANGE how you take these medications    metoprolol succinate 100 MG 24 hr tablet  Commonly known as:  TOPROL-XL  Take 1 tablet (100 mg total) by mouth once daily.  Start taking on:  8/9/2019  What changed:    · medication strength  · how much to take        CONTINUE taking these medications    ALCOHOL PREP PADS TOP     amLODIPine 10 MG tablet  Commonly known as:  NORVASC  TAKE 1 TABLET ONE TIME DAILY     aspirin 81 MG EC tablet  Commonly known as:  ECOTRIN  Take 1 tablet (81 mg total) by mouth once daily.     atorvastatin 40 MG tablet  Commonly known as:  LIPITOR  Take 1 tablet (40 mg total) by mouth every evening.     azaTHIOprine 50 mg Tab  Commonly known as:  IMURAN  Take 1 tablet (50 mg total) by mouth once daily.     baclofen 10 MG tablet  Commonly known as:  LIORESAL  Take 1 tablet (10 mg total) by mouth once daily.     blood-glucose meter kit  Commonly known as:  TRUE METRIX AIR GLUCOSE METER  TEST FOUR TIMES DAILY BEFORE MEALS  AND EVERY NIGHT     ELIQUIS 5 mg Tab  Generic drug:  apixaban     fluticasone-salmeterol 250-50 mcg/dose 250-50 mcg/dose diskus inhaler  Commonly known as:  ADVAIR DISKUS  Inhale 1 puff into the lungs 2 (two) times daily. Controller     furosemide 40 MG tablet  Commonly known as:  LASIX  Take 2 tablets (80 mg total) by mouth every morning AND 1 tablet (40 mg total) every evening.     gabapentin 800 MG tablet  Commonly known as:  NEURONTIN  Take 1 tablet (800 mg total) by mouth  "3 (three) times daily.     glimepiride 2 MG tablet  Commonly known as:  AMARYL  Take 1 tablet (2 mg total) by mouth before breakfast.     inhalation spacing device  Use as directed for inhalation.     insulin aspart U-100 100 unit/mL (3 mL) Inpn pen  Commonly known as:  NovoLOG Flexpen U-100 Insulin  INJECT 20 UNITS SUBCUTANEOUSLY THREE TIMES DAILY WITH MEALS     insulin glargine 100 units/mL (3mL) SubQ pen  Commonly known as:  LANTUS SOLOSTAR U-100 INSULIN  Inject 80 Units into the skin every evening.     insulin syringe-needle U-100 1 mL 31 gauge x 5/16 Syrg     ipratropium-albuterol  mcg/actuation inhaler  Commonly known as:  COMBIVENT  Inhale 1 puff into the lungs 4 (four) times daily. Rescue     isosorbide mononitrate 60 MG 24 hr tablet  Commonly known as:  IMDUR  Take 1 tablet (60 mg total) by mouth once daily.     lancets 32 gauge Misc  1 lancet by Misc.(Non-Drug; Combo Route) route 2 (two) times daily.     latanoprost 0.005 % ophthalmic solution  Place 1 drop into both eyes every evening.     lisinopril 10 MG tablet  Take 1 tablet (10 mg total) by mouth once daily.     metFORMIN 1000 MG tablet  Commonly known as:  GLUCOPHAGE  Take 1 tablet (1,000 mg total) by mouth 2 (two) times daily with meals.     metOLazone 2.5 MG tablet  Commonly known as:  ZAROXOLYN  Take 1 tablet (2.5 mg total) by mouth once daily.     nitroGLYCERIN 0.3 MG SL tablet  Commonly known as:  NITROSTAT  PLACE 1 TABLET UNDER THE TONGUE EVERY 5 MINUTES AS NEEDED FOR CHEST PAIN, NO MORE THAN 3 TABLETS IN ONE DAY     pantoprazole 40 MG tablet  Commonly known as:  PROTONIX  Take 1 tablet (40 mg total) by mouth once daily.     pen needle, diabetic 31 gauge x 5/16" Ndle  Commonly known as:  BD ULTRA-FINE SHORT PEN NEEDLE  Inject 1 each into the skin 3 (three) times daily.     predniSONE 20 MG tablet  Commonly known as:  DELTASONE  Take 1 tablet (20 mg total) by mouth once daily.     semaglutide 0.25 mg or 0.5 mg(2 mg/1.5 mL) Pnij  Commonly " known as:  OZEMPIC  Inject 0.25 mg into the skin every 7 days.     sertraline 50 MG tablet  Commonly known as:  ZOLOFT  TAKE 1 TABLET ONE TIME DAILY     tiotropium 18 mcg inhalation capsule  Commonly known as:  SPIRIVA WITH HANDIHALER  Inhale 1 capsule (18 mcg total) into the lungs once daily. Controller     traZODone 150 MG tablet  Commonly known as:  DESYREL  TAKE 1 TABLET EVERY NIGHT        STOP taking these medications    VENTOLIN HFA 90 mcg/actuation inhaler  Generic drug:  albuterol            Indwelling Lines/Drains at time of discharge:   Lines/Drains/Airways          None          Time spent on the discharge of patient: >35 minutes  Patient was seen and examined on the date of discharge and determined to be suitable for discharge.         Asya Vela NP  Department of Hospital Medicine  [facility]

## 2019-08-08 NOTE — HOSPITAL COURSE
Crow Green is a 64 year old male who was admitted to [facility] for further evaluation of chest pain. He was noted in atrial fibrillation with RVR. He was given one dose of Metoprolol 5mg IV  X 1 and started on Cardizem infusion. Troponin chronically elevated and flat. Seen by Cardiology who stopped Cardizem infusion and increased metoprolol to 100mg BID. He will continue Eliquis for CVA prophylaxis.  Contributing factor factor of RVR was felt to be related to albuterol. This was discontinued. Echocardiogram unchanged from two years ago. Patient was asked to follow up with PCP and Cardiology in one week. Cardiology will make appointment and contact patient. Patient seen and examined on date of discharge and deemed suitable.

## 2019-08-08 NOTE — DISCHARGE SUMMARY
[facility] Hospital Medicine  Discharge Summary      Patient Name: Crow Green  MRN: [id number]  Admission Date: 8/7/2019  Hospital Length of Stay: 0 days  Discharge Date and Time:  08/08/2019 1:00 PM  Attending Physician: Justice Cai MD   Discharging Provider: Asya Vela NP  Primary Care Provider: Mateo Lambert DO      HPI:    Crow Green is a 64 y.o. male patient with a PMHx of Afib, asthma, CHF, DM, Sleep apnea, Depression, and HTN who was referred to the ED by Dr. Alicea (Pulmonology) for evaluation of C hest Pain.  Pt described pain as mid-sternal chest pressure rated 10/10 which onset suddenly  in clinic during PFTs.   A rapid response was called and pt reported mild improvement after NTG 0.4 SL given.  EKG in clinic showed Afib with RVR with metoprolol 5 mg IV received. Symptoms are constant and moderate in severity.  No mitigating or exacerbating factors reported. Associated symptoms include: HA, palpitations, dizziness, diaphoresis, and nausea.  Patient denies any fever, chills, leg swelling, cough, SOB, extremity numbness, weakness, vomiting, and all other sxs at this time. No further complaints or concerns at this time.  Pt states he has been out of 3-4 medications but does note know the names.  Pt is followed outpatient by Dr. HEMANT Lambert (PCP) and does not know the name of his Cardiologist.  Pt is a full code and Anita Jones (daughter) at [phone] is the surrogate decision maker.  ER work up showed: BUN/Creatinine 37/1.5, Glucose 59, , and Troponin 0.078.  Chest xray unremarkable.  EKG showed Afib w/RVR .  Cardiology consulted with Hospital Medicine contacted for admission.     * No surgery found *      Hospital Course:   No notes on file     Consults:   Consults (From admission, onward)        Status Ordering Provider     Inpatient consult to Cardiology  Once     Provider:  Shawna Rojas NP    Completed KELSI FALK           Leukocytosis-resolved as of 8/8/2019  -likely reactive   -chest xray negative  -pt afebrile   -UA results pending   -repeat CBC in am         Final Active Diagnoses:    Diagnosis Date Noted POA    Atrial fibrillation with RVR [I48.91] 08/07/2019 Unknown    Elevated troponin [R74.8] 07/12/2017 Yes    SANDRITA on CPAP [G47.33, Z99.89] 01/12/2017 Not Applicable    Moderate asthma [J45.909] 01/12/2017 Yes    Coronary artery disease of native artery of native heart with stable angina pectoris [I25.118] 12/05/2016 Yes    Chronic systolic congestive heart failure [I50.22] 12/05/2016 Yes    Hypertension associated with diabetes [E11.59, I10] 02/27/2015 Yes      Problems Resolved During this Admission:    Diagnosis Date Noted Date Resolved POA    Chest pain [R07.9] 08/07/2019 08/08/2019 Yes    JOSE MARIA (acute kidney injury) [N17.9] 08/07/2019 08/08/2019 Unknown    Leukocytosis [D72.829] 08/07/2019 08/08/2019 Unknown       Discharged Condition: stable    Disposition:     Follow Up:    Patient Instructions:   No discharge procedures on file.    Significant Diagnostic Studies: Labs:   CMP   Recent Labs   Lab 08/07/19  1408 08/08/19  0317    141   K 3.7 3.7    105   CO2 27 28   GLU 59* 76   BUN 37* 29*   CREATININE 1.5* 1.1   CALCIUM 9.5 9.0   PROT 7.1  --    ALBUMIN 3.5  --    BILITOT 0.5  --    ALKPHOS 61  --    AST 17  --    ALT 25  --    ANIONGAP 10 8   ESTGFRAFRICA 57* >60   EGFRNONAA 49* >60    and CBC   Recent Labs   Lab 08/07/19  1125 08/07/19  1408 08/08/19  0317   WBC 12.23 16.06* 11.60   HGB 13.2* 13.2* 11.5*   HCT 41.6 41.4 35.6*    194 194       Pending Diagnostic Studies:     None         Medications:  Reconciled Home Medications:      Medication List      CHANGE how you take these medications    metoprolol succinate 100 MG 24 hr tablet  Commonly known as:  TOPROL-XL  Take 1 tablet (100 mg total) by mouth once daily.  Start taking on:  8/9/2019  What changed:    · medication strength  · how  much to take        CONTINUE taking these medications    ALCOHOL PREP PADS TOP     amLODIPine 10 MG tablet  Commonly known as:  NORVASC  TAKE 1 TABLET ONE TIME DAILY     aspirin 81 MG EC tablet  Commonly known as:  ECOTRIN  Take 1 tablet (81 mg total) by mouth once daily.     atorvastatin 40 MG tablet  Commonly known as:  LIPITOR  Take 1 tablet (40 mg total) by mouth every evening.     azaTHIOprine 50 mg Tab  Commonly known as:  IMURAN  Take 1 tablet (50 mg total) by mouth once daily.     baclofen 10 MG tablet  Commonly known as:  LIORESAL  Take 1 tablet (10 mg total) by mouth once daily.     blood-glucose meter kit  Commonly known as:  TRUE METRIX AIR GLUCOSE METER  TEST FOUR TIMES DAILY BEFORE MEALS  AND EVERY NIGHT     ELIQUIS 5 mg Tab  Generic drug:  apixaban     fluticasone-salmeterol 250-50 mcg/dose 250-50 mcg/dose diskus inhaler  Commonly known as:  ADVAIR DISKUS  Inhale 1 puff into the lungs 2 (two) times daily. Controller     furosemide 40 MG tablet  Commonly known as:  LASIX  Take 2 tablets (80 mg total) by mouth every morning AND 1 tablet (40 mg total) every evening.     gabapentin 800 MG tablet  Commonly known as:  NEURONTIN  Take 1 tablet (800 mg total) by mouth 3 (three) times daily.     glimepiride 2 MG tablet  Commonly known as:  AMARYL  Take 1 tablet (2 mg total) by mouth before breakfast.     inhalation spacing device  Use as directed for inhalation.     insulin aspart U-100 100 unit/mL (3 mL) Inpn pen  Commonly known as:  NovoLOG Flexpen U-100 Insulin  INJECT 20 UNITS SUBCUTANEOUSLY THREE TIMES DAILY WITH MEALS     insulin glargine 100 units/mL (3mL) SubQ pen  Commonly known as:  LANTUS SOLOSTAR U-100 INSULIN  Inject 80 Units into the skin every evening.     insulin syringe-needle U-100 1 mL 31 gauge x 5/16 Syrg     ipratropium-albuterol  mcg/actuation inhaler  Commonly known as:  COMBIVENT  Inhale 1 puff into the lungs 4 (four) times daily. Rescue     isosorbide mononitrate 60 MG 24 hr  "tablet  Commonly known as:  IMDUR  Take 1 tablet (60 mg total) by mouth once daily.     lancets 32 gauge Misc  1 lancet by Misc.(Non-Drug; Combo Route) route 2 (two) times daily.     latanoprost 0.005 % ophthalmic solution  Place 1 drop into both eyes every evening.     lisinopril 10 MG tablet  Take 1 tablet (10 mg total) by mouth once daily.     metFORMIN 1000 MG tablet  Commonly known as:  GLUCOPHAGE  Take 1 tablet (1,000 mg total) by mouth 2 (two) times daily with meals.     metOLazone 2.5 MG tablet  Commonly known as:  ZAROXOLYN  Take 1 tablet (2.5 mg total) by mouth once daily.     nitroGLYCERIN 0.3 MG SL tablet  Commonly known as:  NITROSTAT  PLACE 1 TABLET UNDER THE TONGUE EVERY 5 MINUTES AS NEEDED FOR CHEST PAIN, NO MORE THAN 3 TABLETS IN ONE DAY     pantoprazole 40 MG tablet  Commonly known as:  PROTONIX  Take 1 tablet (40 mg total) by mouth once daily.     pen needle, diabetic 31 gauge x 5/16" Ndle  Commonly known as:  BD ULTRA-FINE SHORT PEN NEEDLE  Inject 1 each into the skin 3 (three) times daily.     predniSONE 20 MG tablet  Commonly known as:  DELTASONE  Take 1 tablet (20 mg total) by mouth once daily.     semaglutide 0.25 mg or 0.5 mg(2 mg/1.5 mL) Pnij  Commonly known as:  OZEMPIC  Inject 0.25 mg into the skin every 7 days.     sertraline 50 MG tablet  Commonly known as:  ZOLOFT  TAKE 1 TABLET ONE TIME DAILY     tiotropium 18 mcg inhalation capsule  Commonly known as:  SPIRIVA WITH HANDIHALER  Inhale 1 capsule (18 mcg total) into the lungs once daily. Controller     traZODone 150 MG tablet  Commonly known as:  DESYREL  TAKE 1 TABLET EVERY NIGHT     VENTOLIN HFA 90 mcg/actuation inhaler  Generic drug:  albuterol  Inhale 2 puffs into the lungs every 4 (four) hours as needed for Wheezing.            Indwelling Lines/Drains at time of discharge:   Lines/Drains/Airways          None          Time spent on the discharge of patient: >35 minutes  Patient was seen and examined on the date of discharge and " determined to be suitable for discharge.         Asya Vela NP  Department of Hospital Medicine  [facility]

## 2019-08-08 NOTE — ASSESSMENT & PLAN NOTE
Echo in July 2017 shows LVEF 45% with DD  Repeat echo pending  Appears compensated on exam   Needs to resume home meds include Toprol, Lisinopril and diuretics   Increase Toprol to 100mg daily   If echo looks ok, would be ok with DC patient home today and follow up in Cardiology clinic

## 2019-08-08 NOTE — PLAN OF CARE
08/08/19 1415   Final Note   Assessment Type Final Discharge Note   Anticipated Discharge Disposition Home   Right Care Referral Info   Post Acute Recommendation No Care

## 2019-08-08 NOTE — HPI
Crow Green is a 64 y.o. male patient with a PMHx of Afib, asthma, CHF, DM, Sleep apnea, Depression, and HTN. Patient presented to the ED with complaints of chest pain undergoing Pulmonary studies in clinic. Had albuterol treatment and then began having chest pain. EKG upon arrival showed A-fib with RVR. Given 1 dose of IV Lopressor 5mg. Started on Cardizem gtt. Currently NSR on Tele and denies any chest pain.  BUN/Creatinine 37/1.5, Glucose 59, , and Troponin 0.078, 0.081, 0.074, 0.079. Troponin flat and chronically elevated.  Chest xray unremarkable. Echo in July 2017 shows LVEF 45% with DD. Nonobstructive CAD on cath in Nov 2016.

## 2019-08-08 NOTE — NURSING
Pt complained of headache. Pt requests Tylenol. Notified Devon Scott NP. Acetaminophen 650 mg ordered per Devon Scott NP. Confirmed through read back.

## 2019-08-08 NOTE — PLAN OF CARE
Problem: Adult Inpatient Plan of Care  Goal: Plan of Care Review  Outcome: Ongoing (interventions implemented as appropriate)  Pt on room air tolerating well. No distress noted at this time. Pt does not need a cpap machine at this time.

## 2019-08-08 NOTE — CONSULTS
[facility] -   Cardiology  Consult Note    Patient Name: Crow Green  MRN: [id number]  Admission Date: 8/7/2019  Hospital Length of Stay: 0 days  Code Status: Prior   Attending Provider: Justice Cai MD   Consulting Provider: ROMIE Cisse  Primary Care Physician: Mateo Lambert DO  Principal Problem:<principal problem not specified>    Patient information was obtained from patient and ER records.     Inpatient consult to Cardiology  Consult performed by: ROMIE Nunez  Consult ordered by: Annabelle Choudhary NP  Reason for consult: A-fib and chest pain         Subjective:     Chief Complaint:  Chest pain      HPI:   Crow Green is a 64 y.o. male patient with a PMHx of Afib, asthma, CHF, DM, Sleep apnea, Depression, and HTN. Patient presented to the ED with complaints of chest pain undergoing Pulmonary studies in clinic. Had albuterol treatment and then began having chest pain. EKG upon arrival showed A-fib with RVR. Given 1 dose of IV Lopressor 5mg. Started on Cardizem gtt. Currently NSR on Tele and denies any chest pain.  BUN/Creatinine 37/1.5, Glucose 59, , and Troponin 0.078, 0.081, 0.074, 0.079. Troponin flat and chronically elevated.  Chest xray unremarkable. Echo in July 2017 shows LVEF 45% with DD. Nonobstructive CAD on cath in Nov 2016.     Past Medical History:   Diagnosis Date    Acute kidney injury     Arthritis     Asthma     Atrial fibrillation     CHF (congestive heart failure)     Depression     Diabetes mellitus, type 2 Diagnosed in 2000    Elevated PSA     Hypertension     Sleep apnea        Past Surgical History:   Procedure Laterality Date    BIOPSY-ARTERY-TEMPORAL Left 9/1/2017    Performed by Torin Bishop MD at [facility] OR    CHOLECYSTECTOMY      CHOLECYSTECTOMY-LAPAROSCOPIC N/A 3/17/2017    Performed by Louis O. Jeansonne IV, MD at [facility] OR       Review of patient's allergies indicates:  No Known Allergies    No current  facility-administered medications on file prior to encounter.      Current Outpatient Medications on File Prior to Encounter   Medication Sig    aspirin (ECOTRIN) 81 MG EC tablet Take 1 tablet (81 mg total) by mouth once daily.    ALCOHOL ANTISEPTIC PADS (ALCOHOL PREP PADS TOP)     amLODIPine (NORVASC) 10 MG tablet TAKE 1 TABLET ONE TIME DAILY    atorvastatin (LIPITOR) 40 MG tablet Take 1 tablet (40 mg total) by mouth every evening.    azaTHIOprine (IMURAN) 50 mg Tab Take 1 tablet (50 mg total) by mouth once daily.    baclofen (LIORESAL) 10 MG tablet Take 1 tablet (10 mg total) by mouth once daily.    blood-glucose meter (TRUE METRIX AIR GLUCOSE METER) kit TEST FOUR TIMES DAILY BEFORE MEALS  AND EVERY NIGHT    ELIQUIS 5 mg Tab     fluticasone-salmeterol 250-50 mcg/dose (ADVAIR DISKUS) 250-50 mcg/dose diskus inhaler Inhale 1 puff into the lungs 2 (two) times daily. Controller    furosemide (LASIX) 40 MG tablet Take 2 tablets (80 mg total) by mouth every morning AND 1 tablet (40 mg total) every evening.    gabapentin (NEURONTIN) 800 MG tablet Take 1 tablet (800 mg total) by mouth 3 (three) times daily.    glimepiride (AMARYL) 2 MG tablet Take 1 tablet (2 mg total) by mouth before breakfast.    inhalation spacing device Use as directed for inhalation.    insulin (LANTUS SOLOSTAR U-100 INSULIN) glargine 100 units/mL (3mL) SubQ pen Inject 80 Units into the skin every evening.    insulin aspart U-100 (NOVOLOG FLEXPEN U-100 INSULIN) 100 unit/mL (3 mL) InPn pen INJECT 20 UNITS SUBCUTANEOUSLY THREE TIMES DAILY WITH MEALS    insulin syringe-needle U-100 1 mL 31 gauge x 5/16 Syrg     ipratropium-albuterol (COMBIVENT)  mcg/actuation inhaler Inhale 1 puff into the lungs 4 (four) times daily. Rescue    isosorbide mononitrate (IMDUR) 60 MG 24 hr tablet Take 1 tablet (60 mg total) by mouth once daily.    lancets 32 gauge Misc 1 lancet by Misc.(Non-Drug; Combo Route) route 2 (two) times daily.     "latanoprost 0.005 % ophthalmic solution Place 1 drop into both eyes every evening.    lisinopril 10 MG tablet Take 1 tablet (10 mg total) by mouth once daily.    metFORMIN (GLUCOPHAGE) 1000 MG tablet Take 1 tablet (1,000 mg total) by mouth 2 (two) times daily with meals.    metOLazone (ZAROXOLYN) 2.5 MG tablet Take 1 tablet (2.5 mg total) by mouth once daily.    metoprolol succinate (TOPROL-XL) 50 MG 24 hr tablet TAKE 1 TABLET EVERY DAY    nitroGLYCERIN (NITROSTAT) 0.3 MG SL tablet PLACE 1 TABLET UNDER THE TONGUE EVERY 5 MINUTES AS NEEDED FOR CHEST PAIN, NO MORE THAN 3 TABLETS IN ONE DAY    pantoprazole (PROTONIX) 40 MG tablet Take 1 tablet (40 mg total) by mouth once daily.    pen needle, diabetic (BD ULTRA-FINE SHORT PEN NEEDLE) 31 gauge x 5/16" Ndle Inject 1 each into the skin 3 (three) times daily.    predniSONE (DELTASONE) 20 MG tablet Take 1 tablet (20 mg total) by mouth once daily.    semaglutide (OZEMPIC) 0.25 mg or 0.5 mg(2 mg/1.5 mL) PnIj Inject 0.25 mg into the skin every 7 days.    sertraline (ZOLOFT) 50 MG tablet TAKE 1 TABLET ONE TIME DAILY    tiotropium (SPIRIVA WITH HANDIHALER) 18 mcg inhalation capsule Inhale 1 capsule (18 mcg total) into the lungs once daily. Controller    traZODone (DESYREL) 150 MG tablet TAKE 1 TABLET EVERY NIGHT    VENTOLIN HFA 90 mcg/actuation inhaler Inhale 2 puffs into the lungs every 4 (four) hours as needed for Wheezing.     Family History     Problem Relation (Age of Onset)    Cataracts Mother, Father, Sister    Glaucoma Mother, Sister, Maternal Aunt    No Known Problems Brother, Daughter, Son        Tobacco Use    Smoking status: Never Smoker    Smokeless tobacco: Never Used   Substance and Sexual Activity    Alcohol use: No    Drug use: No    Sexual activity: Not Currently     Review of Systems   Constitution: Negative for diaphoresis, malaise/fatigue, weight gain and weight loss.   HENT: Negative for congestion and nosebleeds.    Cardiovascular: " Positive for chest pain and palpitations. Negative for claudication, cyanosis, dyspnea on exertion, irregular heartbeat, leg swelling, near-syncope, orthopnea, paroxysmal nocturnal dyspnea and syncope.   Respiratory: Negative for cough, hemoptysis, shortness of breath, sleep disturbances due to breathing, snoring, sputum production and wheezing.    Hematologic/Lymphatic: Negative for bleeding problem. Does not bruise/bleed easily.   Skin: Negative for rash.   Musculoskeletal: Negative for arthritis, back pain, falls, joint pain, muscle cramps and muscle weakness.   Gastrointestinal: Negative for abdominal pain, constipation, diarrhea, heartburn, hematemesis, hematochezia, melena, nausea and vomiting.   Genitourinary: Negative for dysuria, hematuria and nocturia.   Neurological: Negative for excessive daytime sleepiness, dizziness, headaches, light-headedness, loss of balance, numbness, vertigo and weakness.     Objective:     Vital Signs (Most Recent):  Temp: 97.4 °F (36.3 °C) (08/08/19 0733)  Pulse: 71 (08/08/19 0733)  Resp: 20 (08/08/19 0733)  BP: 112/69 (08/08/19 0733)  SpO2: 98 % (08/08/19 0733) Vital Signs (24h Range):  Temp:  [96.9 °F (36.1 °C)-98.2 °F (36.8 °C)] 97.4 °F (36.3 °C)  Pulse:  [] 71  Resp:  [16-23] 20  SpO2:  [94 %-98 %] 98 %  BP: ()/(50-80) 112/69     Weight: 87.2 kg (192 lb 3.9 oz)  Body mass index is 31.99 kg/m².    SpO2: 98 %  O2 Device (Oxygen Therapy): room air      Intake/Output Summary (Last 24 hours) at 8/8/2019 1014  Last data filed at 8/8/2019 0600  Gross per 24 hour   Intake 1552.91 ml   Output 650 ml   Net 902.91 ml       Lines/Drains/Airways     Peripheral Intravenous Line                 Peripheral IV - Single Lumen 08/07/19 1328 24 G Right Hand less than 1 day         Peripheral IV - Single Lumen 08/07/19 1341 20 G Left Forearm less than 1 day                Physical Exam   Constitutional: He is oriented to person, place, and time. He appears well-developed and  well-nourished.   Neck: Neck supple. No JVD present.   Cardiovascular: Normal rate, regular rhythm, normal heart sounds and normal pulses. Exam reveals no friction rub.   No murmur heard.  Pulmonary/Chest: Effort normal and breath sounds normal. No respiratory distress. He has no wheezes. He has no rales.   Abdominal: Soft. Bowel sounds are normal. He exhibits no distension.   Musculoskeletal: He exhibits no edema or tenderness.   Neurological: He is alert and oriented to person, place, and time.   Skin: Skin is warm and dry. No rash noted.   Psychiatric: He has a normal mood and affect. His behavior is normal.   Nursing note and vitals reviewed.      Significant Labs:   All pertinent lab results from the last 24 hours have been reviewed. and   Recent Lab Results       08/08/19  0317   08/07/19  2137   08/07/19  2113   08/07/19  1645   08/07/19  1625        DLCOc SBVA LLN               DLCOc SBVA Pre Ref               DLCOc SBVA Ref               DLCOc Single Breath LLN               DLCOc Single Breath Pre Ref               DLCOc Single Breath Ref               DLCO Single Breath LLN               DLCO Single Breath Pre Ref               DLCO Single Breath Ref               DLCOVA LLN               DLCOVA PRE               DLCOVA Pre Ref               DLCOVA Ref               DLVAAdj PRE               ERV LLN               ERV Pre Ref               ERV Ref               FEF 25 75 LLN               FEF 25 75 Pre Ref               FEF 25 75 Ref               FEV1 FVC LLN               FEV1 FVC Pre Ref               FEV1 FVC Ref               FEV1 LLN               FEV1 Pre Ref               FEV1 Ref               FRCpleth LLN               FRCpleth PreRef               FRCpleth Ref               FVC LLN               FVC Pre Ref               FVC Ref               IVC PRE               IVC Single Breath LLN               IVC Single Breath Pre Ref               IVC Single Breath Ref               MVV LLN                MVV Pre Ref               MVV Ref               PEF LLN               PEF Pre Ref               PEF Ref               Raw LLN               Raw PRE               Raw Pre Ref               Raw Ref               RV LLN               RV Pre Ref               RV Ref               RVTLC LLN               RVTLC PRE               RVTLC Pre Ref               RVTLC Ref               TLC LLN               TLC Pre Ref               TLC Ref               VA PRE               VA Single Breath LLN               VA Single Breath Pre Ref               VA Single Breath Ref               VC LLN               VC PRE               VC Pre Ref               VC Ref               Albumin               Alkaline Phosphatase               ALT               Anion Gap 8             Aniso               aPTT 24.3  Comment:  aPTT therapeutic range = 39-69 seconds             AST               Baso # 0.02             Basophil% 0.2             BILIRUBIN TOTAL               BNP               Interpretation               BUN, Bld 29             Calcium 9.0             Chloride 105             CO2 28             Creatinine 1.1             D-Dimer               Differential Method Automated             DLCO ADJ PRE               eGFR if  >60             eGFR if non  >60  Comment:  Calculation used to obtain the estimated glomerular filtration  rate (eGFR) is the CKD-EPI equation.                Eos # 0.1             Eosinophil% 0.9             Large/Giant Platelets               Glucose 76             Gran # (ANC) 5.5             Gran% 47.5             Hematocrit 35.6             Hemoglobin 11.5             Hypo               Immature Grans (Abs)               Immature Granulocytes               Coumadin Monitoring INR 1.0  Comment:  Coumadin Therapy:  2.0 - 3.0 for INR for all indicators except mechanical heart valves  and antiphospholipid syndromes which should use 2.5 - 3.5.               Lymph # 5.2             Lymph%  44.5             Magnesium 1.5             MCH 28.5             MCHC 32.3             MCV 88             Mono # 0.8             Mono% 7.2             MPV 10.3             nRBC               Ovalocytes               Phosphorus 3.6             Platelet Estimate               Platelets 194             POCT Glucose     108   80     Poik               Potassium 3.7             Pre DLCO               Pre ERV               Pre FEF 25 75               Pre                Pre FEV1               Pre FEV1 FVC               Pre FRC PL               Pre FVC               Pre MVV               Pre PEF               Pre RV               Pre TLC               PROTEIN TOTAL               Protime 10.4             RBC 4.04             RDW 14.4             Sodium 141             Spherocytes               Tear Drop Cells               Troponin I 0.079  Comment:  The reference interval for Troponin I represents the 99th percentile   cutoff   for our facility and is consistent with 3rd generation assay   performance.   0.074  Comment:  The reference interval for Troponin I represents the 99th percentile   cutoff   for our facility and is consistent with 3rd generation assay   performance.     0.081  Comment:  The reference interval for Troponin I represents the 99th percentile   cutoff   for our facility and is consistent with 3rd generation assay   performance.         TSH       1.781       VTGRAWPRE               WBC 11.60                              08/07/19  1447   08/07/19  1429   08/07/19  1408   08/07/19  1329   08/07/19  1125        DLCOc SBVA LLN       2.65       DLCOc SBVA Pre Ref       151.2       DLCOc SBVA Ref       4.02       DLCOc Single Breath LLN       17.59       DLCOc Single Breath Pre Ref       49.0       DLCOc Single Breath Ref       24.51       DLCO Single Breath LLN       17.59       DLCO Single Breath Pre Ref       45.5       DLCO Single Breath Ref       24.51       DLCOVA LLN       2.65       DLCOVA PRE        5.64       DLCOVA Pre Ref       140.4       DLCOVA Ref       4.02       DLVAAdj PRE       6.07       ERV LLN       1.03       ERV Pre Ref       60.3       ERV Ref       1.03       FEF 25 75 LLN       0.90       FEF 25 75 Pre Ref       104.2       FEF 25 75 Ref       2.20       FEV1 FVC LLN       67       FEV1 FVC Pre Ref       103.7       FEV1 FVC Ref       79       FEV1 LLN       1.82       FEV1 Pre Ref       72.8       FEV1 Ref       2.54       FRCpleth LLN       2.34       FRCpleth PreRef       63.8       FRCpleth Ref       3.33       FVC LLN       2.38       FVC Pre Ref       70.3       FVC Ref       3.22       IVC PRE       1.17       IVC Single Breath LLN       2.38       IVC Single Breath Pre Ref       36.3       IVC Single Breath Ref       3.22       MVV LLN       95       MVV Pre Ref       49.9       MVV Ref       112       PEF LLN       4.85       PEF Pre Ref       70.9       PEF Ref       7.14       Raw LLN       3.06       Raw PRE       3.70       Raw Pre Ref       120.9       Raw Ref       3.06       RV LLN       1.62       RV Pre Ref       58.9       RV Ref       2.30       RVTLC LLN       29       RVTLC PRE       36.52       RVTLC Pre Ref       95.7       RVTLC Ref       38       TLC LLN       4.95       TLC Pre Ref       60.7       TLC Ref       6.10       VA PRE       2.02       VA Single Breath LLN       5.95       VA Single Breath Pre Ref       34.0       VA Single Breath Ref       5.95       VC LLN       2.38       VC PRE       2.35       VC Pre Ref       73.1       VC Ref       3.22       Albumin     3.5         Alkaline Phosphatase     61         ALT     25         Anion Gap     10         Aniso     Slight         aPTT               AST     17         Baso #     0.01   0.02     Basophil%     0.1   0.2     BILIRUBIN TOTAL     0.5  Comment:  For infants and newborns, interpretation of results should be based  on gestational age, weight and in agreement with clinical  observations.  Premature  Infant recommended reference ranges:  Up to 24 hours.............<8.0 mg/dL  Up to 48 hours............<12.0 mg/dL  3-5 days..................<15.0 mg/dL  6-29 days.................<15.0 mg/dL           BNP     137  Comment:  Values of less than 100 pg/ml are consistent with non-CHF populations.         Interpretation         Normal spirometry. (FEV1/VC greater than or equal to LLN and FVC greater than or equal to LLN)  Normal airflow. (FEV1/VC greater than or equal to LLN)  Moderate restriction.(TLC> or equal to 60% predicted and < 70% predicted)  Moderate reduction in diffusion capacity - adjusted for hemoglobin. (DLCO > or equal to 40% and < 60% predicted).   Adjusted for alveolar volume DLCO was normal.  Since 05/28/2019: FEV1, FVC and DLCO have declined.         BUN, Bld     37         Calcium     9.5         Chloride     103         CO2     27         Creatinine     1.5         D-Dimer     <0.19  Comment:  The quantitative D-dimer assay should be used as an aid in   the diagnosis of deep vein thrombosis and pulmonary embolism  in patients with the appropriate presentation and clinical  history. The upper limit of the reference interval and the clinical   cut off   point are identical. Causes of a positive (>0.50 mg/L FEU) D-Dimer   test  include, but are not limited to: DVT, PE, DIC, thrombolytic   therapy, anticoagulant therapy, recent surgery, trauma, or   pregnancy, disseminated malignancy, aortic aneurysm, cirrhosis,  and severe infection. False negative results may occur in   patients with distal DVT.           Differential Method     Automated   Automated     DLCO ADJ PRE       12.02       eGFR if      57         eGFR if non      49  Comment:  Calculation used to obtain the estimated glomerular filtration  rate (eGFR) is the CKD-EPI equation.            Eos #     0.1   0.0     Eosinophil%     0.4   0.1     Large/Giant Platelets     Present         Glucose     59          Gran # (ANC)     8.4   8.0     Gran%     52.0   65.7     Hematocrit     41.4   41.6     Hemoglobin     13.2   13.2     Hypo     Occasional         Immature Grans (Abs)         0.03  Comment:  Mild elevation in immature granulocytes is non specific and   can be seen in a variety of conditions including stress response,   acute inflammation, trauma and pregnancy. Correlation with other   laboratory and clinical findings is essential.       Immature Granulocytes         0.2     Coumadin Monitoring INR               Lymph #     5.9   3.3     Lymph%     36.9   27.0     Magnesium     1.7         MCH     28.4   28.1     MCHC     31.9   31.7     MCV     89   89     Mono #     1.7   0.9     Mono%     10.6   7.0     MPV     12.0   9.3     nRBC         0     Ovalocytes     Occasional         Phosphorus               Platelet Estimate     Appears normal         Platelets     194   260     POCT Glucose 213 50           Poik     Slight         Potassium     3.7         Pre DLCO       11.16       Pre ERV       0.62       Pre FEF 25 75       2.29       Pre        7.71       Pre FEV1       1.85       Pre FEV1 FVC       81.62       Pre FRC PL       2.13       Pre FVC       2.26       Pre MVV       56.00       Pre PEF       5.06       Pre RV       1.35       Pre TLC       3.71       PROTEIN TOTAL     7.1         Protime               RBC     4.65   4.70     RDW     14.0   13.8     Sodium     140         Spherocytes     Occasional         Tear Drop Cells     Occasional         Troponin I     0.078  Comment:  The reference interval for Troponin I represents the 99th percentile   cutoff   for our facility and is consistent with 3rd generation assay   performance.           TSH               VTGRAWPRE       2.04       WBC     16.06   12.23                          Significant Imaging: Echocardiogram:   2D echo with color flow doppler:   Results for orders placed or performed during the hospital encounter of 07/12/17   2D echo with  color flow doppler   Result Value Ref Range    QEF 45 55 - 65    Diastolic Dysfunction Yes (A)     Narrative    Date of Procedure: 07/12/2017        TEST DESCRIPTION   Technical Quality: This is a technically challenging study. There is poor endocardial definition.     Aorta: The aortic root is normal in size, measuring 2.9 cm at sinotubular junction and 2.9 cm at Sinuses of Valsalva. The proximal ascending aorta is normal in size, measuring 3.2 cm across.     Left Atrium: The left atrial volume index is moderately enlarged, measuring 44.40 cc/m2.     Left Ventricle: The left ventricle is normal in size, with an end-diastolic diameter of 4.6 cm, and an end-systolic diameter of 3.7 cm. Septal wall thickness is mildly increased, and posterior wall thickness is increased, with the septum measuring 1.4 cm   and the posterior wall measuring 1.8 cm across. Relative wall thickness was increased at 0.78, and the LV mass index was increased at 174.5 g/m2 consistent with concentric left ventricular hypertrophy. There are no regional wall motion abnormalities.   Left ventricular systolic function appears mildly depressed. Visually estimated ejection fraction is 45%. The LV Doppler derived stroke volume equals 60.0 ccs.     Diastolic indices: E wave velocity 0.9 m/s, E/A ratio 0.9,  msec., E/e' ratio(avg) 15. There is pseudonormalization of mitral inflow pattern consistent with significant diastolic dysfunction.     Right Atrium: The right atrium is normal in size, measuring 4.5 cm in length and 4.0 cm in width in the apical view.     Right Ventricle: The right ventricle is normal in size. Global right ventricular systolic function appears normal. Tricuspid annular plane systolic excursion (TAPSE) is 1.8 cm.     Aortic Valve:  The aortic valve is normal in structure. The mean gradient obtained across the aortic valve is 4 mmHg.     Mitral Valve:  The mitral valve is normal in structure. The pressure half time is 60 msec.  The calculated mitral valve area is 3.67 cm2. There is mitral annular calcification.     Tricuspid Valve:  The tricuspid valve is normal in structure.     Pulmonary Valve:  The pulmonic valve is not well seen.     IVC: IVC is normal in size and collapses > 50% with a sniff, suggesting normal right atrial pressure of 3 mmHg.     Intracavitary: There is no evidence of pericardial effusion, intracavity mass, thrombi, or vegetation.         CONCLUSIONS     1 - Mildly depressed left ventricular systolic function (EF 45%).     2 - Impaired LV relaxation, elevated LAP (grade 2 diastolic dysfunction).     3 - Normal right ventricular systolic function .     4 - Moderate left atrial enlargement.     5 - Concentric hypertrophy.             This document has been electronically    SIGNED BY: Brandon Pena MD On: 07/12/2017 14:51        Assessment and Plan:     Chest pain  Atypical chest pain likely from conversion to A-fib with RVR after albuterol treatment.   Chest pain resolved after converting to NSR with IV lopressor and Cardizem   Troponin 0.078, 0.081, 0.074, 0.079. Troponin flat and chronically elevated  Nonobstructive CAD on cath in Nov 2016  Chest xray unremarkable  Repeat Echo pending        Atrial fibrillation with RVR  Patient has converted to NSR with Cardizem gtt and IV Lopressor   Recommend stopping Cardizem gtt and increase metoprolol to 50mg BID  Continue Eliquis  A-fib likely triggered by albuterol  Recommend avoiding albuterol in the future.   OP EP referral for PAF    Elevated troponin  Troponin 0.078, 0.081, 0.074, 0.079.   Troponin flat and chronically elevated.  nonobstuctive CAD on C in Nov 2016    Chronic systolic congestive heart failure  Echo in July 2017 shows LVEF 45% with DD  Repeat echo pending  Appears compensated on exam   Needs to resume home meds include Toprol, Lisinopril and diuretics   Increase Toprol to 100mg daily   If echo looks ok, would be ok with DC patient home today and follow up  in Cardiology clinic    Hypertension associated with diabetes  BP controlled on current medical management           VTE Risk Mitigation (From admission, onward)        Ordered     apixaban tablet 5 mg  2 times daily      08/07/19 2031     Place sequential compression device  Until discontinued      08/07/19 1946     Place MARCIA hose  Until discontinued      08/07/19 1946        Chart reviewed. Patient examined by Dr. Pena and agrees with plan that has been outlined.     Thank you for your consult. I will be available as needed. Please contact us if you have any additional questions.    Areli Jamison, ROMIE  Cardiology   [facility]

## 2019-08-09 ENCOUNTER — TELEPHONE (OUTPATIENT)
Dept: INTERNAL MEDICINE | Facility: CLINIC | Age: 64
End: 2019-08-09

## 2019-08-09 ENCOUNTER — TELEPHONE (OUTPATIENT)
Dept: CARDIOLOGY | Facility: CLINIC | Age: 64
End: 2019-08-09

## 2019-08-09 NOTE — TELEPHONE ENCOUNTER
----- Message from Jeniffer Davis sent at 8/9/2019 11:48 AM CDT -----  Contact: Pt  Pt asked that nurse please contact him regarding a work excuse. ([phone])

## 2019-08-14 ENCOUNTER — TELEPHONE (OUTPATIENT)
Dept: PHARMACY | Facility: CLINIC | Age: 64
End: 2019-08-14

## 2019-08-14 ENCOUNTER — INITIAL CONSULT (OUTPATIENT)
Dept: CARDIOLOGY | Facility: CLINIC | Age: 64
End: 2019-08-14
Payer: MEDICARE

## 2019-08-14 VITALS
DIASTOLIC BLOOD PRESSURE: 62 MMHG | HEART RATE: 54 BPM | OXYGEN SATURATION: 93 % | BODY MASS INDEX: 33.35 KG/M2 | SYSTOLIC BLOOD PRESSURE: 134 MMHG | WEIGHT: 200.19 LBS | HEIGHT: 65 IN

## 2019-08-14 DIAGNOSIS — I50.22 CHRONIC SYSTOLIC CONGESTIVE HEART FAILURE: ICD-10-CM

## 2019-08-14 DIAGNOSIS — I25.118 CORONARY ARTERY DISEASE OF NATIVE ARTERY OF NATIVE HEART WITH STABLE ANGINA PECTORIS: ICD-10-CM

## 2019-08-14 DIAGNOSIS — I48.0 PAF (PAROXYSMAL ATRIAL FIBRILLATION): Primary | ICD-10-CM

## 2019-08-14 PROCEDURE — 99999 PR PBB SHADOW E&M-EST. PATIENT-LVL III: CPT | Mod: PBBFAC,HCNC,, | Performed by: INTERNAL MEDICINE

## 2019-08-14 PROCEDURE — 99999 PR PBB SHADOW E&M-EST. PATIENT-LVL III: ICD-10-PCS | Mod: PBBFAC,HCNC,, | Performed by: INTERNAL MEDICINE

## 2019-08-14 PROCEDURE — 99205 PR OFFICE/OUTPT VISIT, NEW, LEVL V, 60-74 MIN: ICD-10-PCS | Mod: HCNC,S$GLB,, | Performed by: INTERNAL MEDICINE

## 2019-08-14 PROCEDURE — 3008F PR BODY MASS INDEX (BMI) DOCUMENTED: ICD-10-PCS | Mod: HCNC,CPTII,S$GLB, | Performed by: INTERNAL MEDICINE

## 2019-08-14 PROCEDURE — 3078F DIAST BP <80 MM HG: CPT | Mod: HCNC,CPTII,S$GLB, | Performed by: INTERNAL MEDICINE

## 2019-08-14 PROCEDURE — 3078F PR MOST RECENT DIASTOLIC BLOOD PRESSURE < 80 MM HG: ICD-10-PCS | Mod: HCNC,CPTII,S$GLB, | Performed by: INTERNAL MEDICINE

## 2019-08-14 PROCEDURE — 3075F PR MOST RECENT SYSTOLIC BLOOD PRESS GE 130-139MM HG: ICD-10-PCS | Mod: HCNC,CPTII,S$GLB, | Performed by: INTERNAL MEDICINE

## 2019-08-14 PROCEDURE — 3008F BODY MASS INDEX DOCD: CPT | Mod: HCNC,CPTII,S$GLB, | Performed by: INTERNAL MEDICINE

## 2019-08-14 PROCEDURE — 3075F SYST BP GE 130 - 139MM HG: CPT | Mod: HCNC,CPTII,S$GLB, | Performed by: INTERNAL MEDICINE

## 2019-08-14 PROCEDURE — 99205 OFFICE O/P NEW HI 60 MIN: CPT | Mod: HCNC,S$GLB,, | Performed by: INTERNAL MEDICINE

## 2019-08-14 NOTE — LETTER
August 14, 2019      Shawna Rojas, NP  [address]  [facility] [address]           [facility]  [address] [place] [address] , [address]  [place] [address]  Phone: [phone]  Fax: [phone]          Patient: Crow Green   MR Number: [id number]   YOB: 1955   Date of Visit: 8/14/2019       Dear Shawna Rojas:    Thank you for referring Crow Green to me for evaluation. Attached you will find relevant portions of my assessment and plan of care.    If you have questions, please do not hesitate to call me. I look forward to following Crow Green along with you.    Sincerely,    Gio Brown MD    Enclosure  CC:  No Recipients    If you would like to receive this communication electronically, please contact [email or web address] or [phone] to request more information on [facility] access.    For providers and/or their staff who would like to refer a patient to [facility], please contact us through our one-stop-shop provider referral line, [facility], at [phone].    If you feel you have received this communication in error or would no longer like to receive these types of communications, please e-mail [email or web address]

## 2019-08-14 NOTE — TELEPHONE ENCOUNTER
Imuran consult declined, pt feels comfortable with counseling from MD office. Pt confirmed shipping of Imuran on  to arrive on 8/15. Address and  verified. $0 copay in 004. Pt plans to start Imuran 8/15. All questions answered and addressed to patients satisfaction. Rph will f/u with patient in 1 week from start, [facility] to contact patient in 3 weeks for refills.

## 2019-08-14 NOTE — PROGRESS NOTES
Subjective:    Patient ID:  Crow Green is a 64 y.o. male who presents for evaluation of Atrial Fibrillation      64 yoM here for AF management. He was undergoing a PFT and developed AF/RVR 8/7/19. He was very symptomatic in AF. He had hypotension leading to admission. He converted to NSR by 8/8/19. His EF was 40-45% (baseline NICM). He was started on eliquis. No known prior similar symptoms. He has CHADSVASC of 3.     NM 7/17:    Nuclear Quantitative Functional Analysis:   LVEF: 38 %  LVED Volume: 127 ml  LVES Volume: 79 ml    Impression: NORMAL MYOCARDIAL PERFUSION  1. The perfusion scan is free of evidence for myocardial ischemia or injury.   2. Resting wall motion is physiologic.   3. There is resting LV dysfunction with a reduced ejection fraction of 38 %.   4. The ventricular volumes are normal at rest and stress.   5. The extracardiac distribution of radioactivity is normal.     Echo 8/19:  CONCLUSIONS     1 - Mildly depressed left ventricular systolic function (EF 45-50%).     2 - Impaired LV relaxation, normal LAP (grade 1 diastolic dysfunction).     3 - Normal right ventricular systolic function .     4 - Moderate left atrial enlargement.     5 - Concentric hypertrophy.     Past Medical History:  No date: Acute kidney injury  No date: Arthritis  No date: Asthma  No date: Atrial fibrillation  No date: CHF (congestive heart failure)  No date: Depression  Diagnosed in 2000: Diabetes mellitus, type 2  No date: Elevated PSA  No date: Hypertension  No date: Sleep apnea    Past Surgical History:  9/1/2017: BIOPSY-ARTERY-TEMPORAL; Left      Comment:  Performed by Torin Bishop MD at [facility] OR  No date: CHOLECYSTECTOMY  3/17/2017: CHOLECYSTECTOMY-LAPAROSCOPIC; N/A      Comment:  Performed by Louis O. Jeansonne IV, MD at [facility] OR    Social History    Socioeconomic History      Marital status: Legally       Spouse name: Not on file      Number of children: 5      Years of education: Not on file       Highest education level: Not on file    Occupational History      Occupation: disabled    Social Needs      Financial resource strain: Not on file      Food insecurity:        Worry: Not on file        Inability: Not on file      Transportation needs:        Medical: Not on file        Non-medical: Not on file    Tobacco Use      Smoking status: Never Smoker      Smokeless tobacco: Never Used    Substance and Sexual Activity      Alcohol use: No      Drug use: No      Sexual activity: Not Currently    Lifestyle      Physical activity:        Days per week: Not on file        Minutes per session: Not on file      Stress: Rather much    Relationships      Social connections:        Talks on phone: Not on file        Gets together: Not on file        Attends Christianity service: Not on file        Active member of club or organization: Not on file        Attends meetings of clubs or organizations: Not on file        Relationship status: Not on file    Other Topics      Concerns:        Not on file    Social History Narrative      Not on file      Review of patient's family history indicates:  Problem: Glaucoma      Relation: Mother          Age of Onset: (Not Specified)  Problem: Cataracts      Relation: Mother          Age of Onset: (Not Specified)  Problem: Cataracts      Relation: Father          Age of Onset: (Not Specified)  Problem: Glaucoma      Relation: Sister          Age of Onset: (Not Specified)  Problem: Cataracts      Relation: Sister          Age of Onset: (Not Specified)  Problem: Glaucoma      Relation: Maternal Aunt          Age of Onset: (Not Specified)  Problem: No Known Problems      Relation: Brother          Age of Onset: (Not Specified)  Problem: No Known Problems      Relation: Daughter          Age of Onset: (Not Specified)  Problem: No Known Problems      Relation: Son          Age of Onset: (Not Specified)  Problem: Prostate cancer      Relation: Neg Hx          Age of Onset: (Not  Specified)        Review of Systems   Constitution: Negative.   HENT: Negative.    Eyes: Negative.    Cardiovascular: Positive for irregular heartbeat and palpitations. Negative for chest pain, dyspnea on exertion, leg swelling, near-syncope and syncope.   Respiratory: Negative.  Negative for shortness of breath.    Endocrine: Negative.    Hematologic/Lymphatic: Negative.    Skin: Negative.    Musculoskeletal: Negative.    Gastrointestinal: Negative.    Genitourinary: Negative.    Neurological: Negative.  Negative for dizziness and light-headedness.   Psychiatric/Behavioral: Negative.    Allergic/Immunologic: Negative.         Objective:    Physical Exam   Constitutional: He is oriented to person, place, and time. He appears well-developed and well-nourished. No distress.   HENT:   Head: Normocephalic and atraumatic.   Eyes: Pupils are equal, round, and reactive to light. EOM are normal.   Neck: Normal range of motion. No JVD present. No thyromegaly present.   Cardiovascular: Normal rate, regular rhythm, S1 normal, S2 normal and normal heart sounds. PMI is not displaced. Exam reveals no gallop and no friction rub.   No murmur heard.  Pulmonary/Chest: Effort normal and breath sounds normal. No respiratory distress. He has no wheezes. He has no rales.   Abdominal: Soft. Bowel sounds are normal. He exhibits no distension. There is no tenderness. There is no rebound and no guarding.   Musculoskeletal: Normal range of motion. He exhibits no edema or tenderness.   Neurological: He is alert and oriented to person, place, and time. No cranial nerve deficit.   Skin: Skin is warm and dry. No rash noted. No erythema.   Psychiatric: He has a normal mood and affect. His behavior is normal. Judgment and thought content normal.   Vitals reviewed.        Assessment:       1. PAF (paroxysmal atrial fibrillation)    2. Chronic systolic congestive heart failure    3. Coronary artery disease of native artery of native heart with stable  angina pectoris         Plan:       64 yoM pAF, NICM here for AF management. I discussed AF and its basic pathophysiology, including its health implications and treatment options with the patient. Specifically, I addressed the need for CVA prophylaxis as well as the goal to reduce symptomatic arrhythmic episodes by pharmacologic and/or procedural methods. He is on appropriate CVA prophylaxis with apixaban. He is on metoprolol for rate control. He is not a good AAD candidate due to lung disease and NICM. If he has recurrent events, I would offer PVI. RTC 3m

## 2019-08-15 ENCOUNTER — OFFICE VISIT (OUTPATIENT)
Dept: CARDIOLOGY | Facility: CLINIC | Age: 64
End: 2019-08-15
Payer: MEDICARE

## 2019-08-15 ENCOUNTER — OUTPATIENT CASE MANAGEMENT (OUTPATIENT)
Dept: ADMINISTRATIVE | Facility: OTHER | Age: 64
End: 2019-08-15

## 2019-08-15 VITALS
WEIGHT: 201.5 LBS | HEART RATE: 94 BPM | DIASTOLIC BLOOD PRESSURE: 63 MMHG | HEIGHT: 65 IN | BODY MASS INDEX: 33.57 KG/M2 | SYSTOLIC BLOOD PRESSURE: 104 MMHG

## 2019-08-15 DIAGNOSIS — E11.69 HYPERLIPIDEMIA ASSOCIATED WITH TYPE 2 DIABETES MELLITUS: ICD-10-CM

## 2019-08-15 DIAGNOSIS — E11.59 HYPERTENSION ASSOCIATED WITH DIABETES: ICD-10-CM

## 2019-08-15 DIAGNOSIS — I48.0 PAF (PAROXYSMAL ATRIAL FIBRILLATION): Primary | ICD-10-CM

## 2019-08-15 DIAGNOSIS — E78.5 HYPERLIPIDEMIA ASSOCIATED WITH TYPE 2 DIABETES MELLITUS: ICD-10-CM

## 2019-08-15 DIAGNOSIS — G47.33 OSA ON CPAP: ICD-10-CM

## 2019-08-15 DIAGNOSIS — I48.91 ATRIAL FIBRILLATION WITH RVR: ICD-10-CM

## 2019-08-15 DIAGNOSIS — I25.118 CORONARY ARTERY DISEASE OF NATIVE ARTERY OF NATIVE HEART WITH STABLE ANGINA PECTORIS: ICD-10-CM

## 2019-08-15 DIAGNOSIS — I15.2 HYPERTENSION ASSOCIATED WITH DIABETES: ICD-10-CM

## 2019-08-15 DIAGNOSIS — I50.32 CHRONIC DIASTOLIC HEART FAILURE: ICD-10-CM

## 2019-08-15 PROCEDURE — 3074F PR MOST RECENT SYSTOLIC BLOOD PRESSURE < 130 MM HG: ICD-10-PCS | Mod: HCNC,CPTII,S$GLB, | Performed by: INTERNAL MEDICINE

## 2019-08-15 PROCEDURE — 99999 PR PBB SHADOW E&M-EST. PATIENT-LVL III: ICD-10-PCS | Mod: PBBFAC,HCNC,, | Performed by: INTERNAL MEDICINE

## 2019-08-15 PROCEDURE — 3046F PR MOST RECENT HEMOGLOBIN A1C LEVEL > 9.0%: ICD-10-PCS | Mod: HCNC,CPTII,S$GLB, | Performed by: INTERNAL MEDICINE

## 2019-08-15 PROCEDURE — 99999 PR PBB SHADOW E&M-EST. PATIENT-LVL III: CPT | Mod: PBBFAC,HCNC,, | Performed by: INTERNAL MEDICINE

## 2019-08-15 PROCEDURE — 3008F BODY MASS INDEX DOCD: CPT | Mod: HCNC,CPTII,S$GLB, | Performed by: INTERNAL MEDICINE

## 2019-08-15 PROCEDURE — 3074F SYST BP LT 130 MM HG: CPT | Mod: HCNC,CPTII,S$GLB, | Performed by: INTERNAL MEDICINE

## 2019-08-15 PROCEDURE — 99214 OFFICE O/P EST MOD 30 MIN: CPT | Mod: HCNC,25,S$GLB, | Performed by: INTERNAL MEDICINE

## 2019-08-15 PROCEDURE — 3008F PR BODY MASS INDEX (BMI) DOCUMENTED: ICD-10-PCS | Mod: HCNC,CPTII,S$GLB, | Performed by: INTERNAL MEDICINE

## 2019-08-15 PROCEDURE — 93000 EKG 12-LEAD: ICD-10-PCS | Mod: HCNC,S$GLB,, | Performed by: INTERNAL MEDICINE

## 2019-08-15 PROCEDURE — 93000 ELECTROCARDIOGRAM COMPLETE: CPT | Mod: HCNC,S$GLB,, | Performed by: INTERNAL MEDICINE

## 2019-08-15 PROCEDURE — 3078F DIAST BP <80 MM HG: CPT | Mod: HCNC,CPTII,S$GLB, | Performed by: INTERNAL MEDICINE

## 2019-08-15 PROCEDURE — 99214 PR OFFICE/OUTPT VISIT, EST, LEVL IV, 30-39 MIN: ICD-10-PCS | Mod: HCNC,25,S$GLB, | Performed by: INTERNAL MEDICINE

## 2019-08-15 PROCEDURE — 3046F HEMOGLOBIN A1C LEVEL >9.0%: CPT | Mod: HCNC,CPTII,S$GLB, | Performed by: INTERNAL MEDICINE

## 2019-08-15 PROCEDURE — 3078F PR MOST RECENT DIASTOLIC BLOOD PRESSURE < 80 MM HG: ICD-10-PCS | Mod: HCNC,CPTII,S$GLB, | Performed by: INTERNAL MEDICINE

## 2019-08-15 RX ORDER — METOLAZONE 2.5 MG/1
2.5 TABLET ORAL
Qty: 36 TABLET | Refills: 3 | Status: SHIPPED | OUTPATIENT
Start: 2019-08-16 | End: 2020-04-28 | Stop reason: ALTCHOICE

## 2019-08-15 RX ORDER — DILTIAZEM HYDROCHLORIDE 60 MG/1
60 CAPSULE, EXTENDED RELEASE ORAL 2 TIMES DAILY
Qty: 60 CAPSULE | Refills: 11 | Status: SHIPPED | OUTPATIENT
Start: 2019-08-15 | End: 2020-04-09 | Stop reason: SDUPTHER

## 2019-08-15 NOTE — PROGRESS NOTES
Subjective:   Patient ID:  Crow Green is a 64 y.o. male who presents for follow up of Follow-up; Shortness of Breath; and Neck Pain      63 yo AAM, came in for post discharge f/u  PMH CAD nonobstructive per LHC done in 11/16, PAF, CHFpEF 45%, DM, hTN, HLD and obesity. Asthma and SANDRITA o nCPAP f/u with Dr. Alicea.  -11/2016, Pt was admitted to [facility] for cough with little sputum for 6 weeks. Had severe left chest pain only with cough and sitting up from the bed. No pain with exercise. Denied palpitation, dizziness, orthopnea, PND and syncope. Troponin up to 0.218 and trending down. Echo showed EF 45% and MPI showed anteroseptal positive. Subsequent LHC showed d2 30% lesion and otherwise demi Dz.   -03/2017, he was admitted to [facility] for acute cholelithiasis, s/p lap aidan. Pt had PAF during the admisssion and has been on amiodarone and Eliquis. BNP was up to 144 from 44 done five months ago.  -05/2018 at [facility], MPI showed SPECT images at rest and stress studies showed a moderate-sized, moderate anteroseptal perfusion defect and a large, severe inferior wall perfusion defect that are fixed. The gated stress study demonstrated a left ventricular ejection fraction of 35%, and ECHo showed EF 50%. Chest CTA showed no PE.  -2018, had urine drug presumptive cocaine positive but pt declined to use cocaine.   08/2019 afib with rvr during PFT, and admitted to [facility] due to weakness and dizziness.TROPONIN 0.07 AND FLAT. Converted to sinus after cardizem and BB. F/u with Dr. Brown on 08/15 and advised PVI if recurrent Afib.   echo showed EF 45%.  Today, EKG showed afib with RVR  bpm. Pt has chronic VARGAS. No chest pain, dizziness and faint                          Past Medical History:   Diagnosis Date    Acute kidney injury     Arthritis     Asthma     Atrial fibrillation     CHF (congestive heart failure)     Depression     Diabetes mellitus, type 2 Diagnosed in 2000    Elevated PSA     Hypertension      Sleep apnea        Past Surgical History:   Procedure Laterality Date    BIOPSY-ARTERY-TEMPORAL Left 9/1/2017    Performed by Torin Bishop MD at [facility] OR    CHOLECYSTECTOMY      CHOLECYSTECTOMY-LAPAROSCOPIC N/A 3/17/2017    Performed by Louis O. Jeansonne IV, MD at [facility] OR       Social History     Tobacco Use    Smoking status: Never Smoker    Smokeless tobacco: Never Used   Substance Use Topics    Alcohol use: No    Drug use: No       Family History   Problem Relation Age of Onset    Glaucoma Mother     Cataracts Mother     Cataracts Father     Glaucoma Sister     Cataracts Sister     Glaucoma Maternal Aunt     No Known Problems Brother     No Known Problems Daughter     No Known Problems Son     Prostate cancer Neg Hx          Review of Systems   Constitution: Negative for decreased appetite, diaphoresis, fever, malaise/fatigue and night sweats.   HENT: Negative for nosebleeds.    Eyes: Negative for blurred vision and double vision.   Cardiovascular: Positive for dyspnea on exertion, orthopnea and paroxysmal nocturnal dyspnea. Negative for chest pain, claudication, irregular heartbeat, leg swelling, near-syncope, palpitations and syncope.   Respiratory: Negative for cough, shortness of breath, sleep disturbances due to breathing, snoring, sputum production and wheezing.    Endocrine: Negative for cold intolerance and polyuria.   Hematologic/Lymphatic: Does not bruise/bleed easily.   Skin: Negative for rash.   Musculoskeletal: Negative for back pain, falls, joint pain, joint swelling and neck pain.   Gastrointestinal: Positive for nausea. Negative for abdominal pain, heartburn and vomiting.   Genitourinary: Negative for dysuria, frequency and hematuria.   Neurological: Positive for headaches. Negative for difficulty with concentration, dizziness, focal weakness, light-headedness, numbness, seizures and weakness.   Psychiatric/Behavioral: Negative for depression, memory loss and substance abuse.  The patient does not have insomnia.    Allergic/Immunologic: Negative for HIV exposure and hives.       Objective:   Physical Exam   Constitutional: He is oriented to person, place, and time. He appears well-nourished.   HENT:   Head: Normocephalic.   Eyes: Pupils are equal, round, and reactive to light.   Neck: Normal carotid pulses and no JVD present. Carotid bruit is not present. No thyromegaly present.   Cardiovascular: Normal heart sounds and normal pulses. An irregularly irregular rhythm present.  No extrasystoles are present. Tachycardia present. PMI is not displaced. Exam reveals no gallop and no S3.   No murmur heard.  Pulmonary/Chest: Breath sounds normal. No stridor. No respiratory distress.   Crackles +     Abdominal: Soft. Bowel sounds are normal. There is no tenderness. There is no rebound.   Musculoskeletal: Normal range of motion.   1+ edema   Neurological: He is alert and oriented to person, place, and time.   Skin: Skin is intact. No rash noted.   Psychiatric: His behavior is normal.       Lab Results   Component Value Date    CHOL 109 (L) 08/08/2019    CHOL 201 (H) 09/13/2018    CHOL 171 07/07/2017     Lab Results   Component Value Date    HDL 42 08/08/2019    HDL 49 09/13/2018    HDL 42 07/07/2017     Lab Results   Component Value Date    LDLCALC 33.4 (L) 08/08/2019    LDLCALC 132.8 09/13/2018    LDLCALC 112.4 07/07/2017     Lab Results   Component Value Date    TRIG 168 (H) 08/08/2019    TRIG 96 09/13/2018    TRIG 83 07/07/2017     Lab Results   Component Value Date    CHOLHDL 38.5 08/08/2019    CHOLHDL 24.4 09/13/2018    CHOLHDL 24.6 07/07/2017       Chemistry        Component Value Date/Time     08/08/2019 0317    K 3.7 08/08/2019 0317     08/08/2019 0317    CO2 28 08/08/2019 0317    BUN 29 (H) 08/08/2019 0317    CREATININE 1.1 08/08/2019 0317    GLU 76 08/08/2019 0317        Component Value Date/Time    CALCIUM 9.0 08/08/2019 0317    ALKPHOS 61 08/07/2019 1408    AST 17 08/07/2019  1408    ALT 25 08/07/2019 1408    BILITOT 0.5 08/07/2019 1408    ESTGFRAFRICA >60 08/08/2019 0317    EGFRNONAA >60 08/08/2019 0317          Lab Results   Component Value Date    HGBA1C 9.7 (H) 08/08/2019     Lab Results   Component Value Date    TSH 1.781 08/07/2019     Lab Results   Component Value Date    INR 1.0 08/08/2019    INR 1.0 07/24/2019    INR 0.9 12/02/2018     Lab Results   Component Value Date    WBC 11.60 08/08/2019    HGB 11.5 (L) 08/08/2019    HCT 35.6 (L) 08/08/2019    MCV 88 08/08/2019     08/08/2019     BMP  Sodium   Date Value Ref Range Status   08/08/2019 141 136 - 145 mmol/L Final     Potassium   Date Value Ref Range Status   08/08/2019 3.7 3.5 - 5.1 mmol/L Final     Chloride   Date Value Ref Range Status   08/08/2019 105 95 - 110 mmol/L Final     CO2   Date Value Ref Range Status   08/08/2019 28 23 - 29 mmol/L Final     BUN, Bld   Date Value Ref Range Status   08/08/2019 29 (H) 8 - 23 mg/dL Final     Creatinine   Date Value Ref Range Status   08/08/2019 1.1 0.5 - 1.4 mg/dL Final     Calcium   Date Value Ref Range Status   08/08/2019 9.0 8.7 - 10.5 mg/dL Final     Anion Gap   Date Value Ref Range Status   08/08/2019 8 8 - 16 mmol/L Final     eGFR if    Date Value Ref Range Status   08/08/2019 >60 >60 mL/min/1.73 m^2 Final     eGFR if non    Date Value Ref Range Status   08/08/2019 >60 >60 mL/min/1.73 m^2 Final     Comment:     Calculation used to obtain the estimated glomerular filtration  rate (eGFR) is the CKD-EPI equation.        BNP  @LABRCNTIP(BNP,BNPTRIAGEBLO)@  @LABRCNTIP(troponini)@  CrCl cannot be calculated (Patient's most recent lab result is older than the maximum 7 days allowed.).  No results found in the last 24 hours.  No results found in the last 24 hours.  No results found in the last 24 hours.    Assessment:      1. PAF (paroxysmal atrial fibrillation)    2. Atrial fibrillation with RVR    3. Coronary artery disease of native artery of  native heart with stable angina pectoris    4. Hypertension associated with diabetes    5. Hyperlipidemia associated with type 2 diabetes mellitus    6. Chronic diastolic heart failure    7. SANDRITA on CPAP      Recurrent Afib with RVR. Pt can not tell if in AFIB  CHFpEF 45%, fluid overloaded    Plan:     Add cardizem 60 mg bid for VR control  HOLTER for afib load  Continue lasix 80 mg daily  Add metolazone 2.5 mg on MWF  Daily weight. Please call the office if gain 3 pounds in 1 day or 5 pounds in 1 week.  Fluid restriction 1.5 liters a day  Na< 2 gm  Continue ASA, Eliquis 5mg bid, BB. ACEI, statin and Imdur  RTC in 3 months

## 2019-08-15 NOTE — PROGRESS NOTES
8/15/19 - SUMMARY: Rec'd call from pt this AM. Reviewed chart and pt was treated in the ED last week for a fib. He was seen by Dr Brown yesterday for a fib and states he is taking his Eloquis. He is aware of his follow up appt with Dr Zhao this afternoon with plans to attend.   INTERVENTION: Discussed med compliance and he advised he has a home health nurse currently visiting weekly setting up his meds and he is taking all as set up. Questioned about whether he will be able to take this over when home health stops and he said he cannot. Discussed alternatives and he advised his daughter may be willing to set up meds for him. Encouraged him to have a conversation with her and ask if she is willing. Advised this CM will talk with her if needed. Discussed blood sugars and he advised AM fasting values are around , later in the day sometimes goes up to 200's, with only one >300. Discussed [employer] [facility] and he advised he has not been contacted by them. Provided him with the number to call to check on status. He advised he is receiving 7-10 meals weekly from [employer], delivered every Tues and this has been very helpful.   PLAN: Follow up in two weeks.  Is he having hypo or hyperglycemia symptoms? Status of CCMP? Discuss CHF and CAD. Outcome of most recent cardio provider appt?    Todays OPCM Self-Management Care Plan was reviewed with the patients/caregivers input based on identified barriers from todays and previous assessments.  Goals were reviewed today with the patient/caregiver and the patient has agreed to continue to work towards these goals to improve his/her overall well-being. Patient verbalized understanding of the care plan, goals, and all of today's instructions. Encouraged patient/caregiver to communicate with his/her physician and health care team about health conditions and the treatment plan.  Provided my contact information today and encouraged patient/caregiver to call me  with any questions as needed. Dang Clay RN

## 2019-08-16 PROBLEM — I50.32 CHRONIC DIASTOLIC HEART FAILURE: Status: ACTIVE | Noted: 2018-05-11

## 2019-08-19 NOTE — PROGRESS NOTES
Summary: Phoned patient. Patient verified that he is using [employer] [facility] and is attending IOP. He stated that he has no other needs at this time. Discussed case closure, patient expressed agreement to case closure.     Interventions: Verification of utilization of resources.     Plan: Case closure, notified OPCM RN.     Todays OPCM Self-Management Care Plan was developed with the patients/caregivers input and was based on identified barriers from todays assessment.  Goals were written today with the patient/caregiver and the patient has agreed to work towards these goals to improve his/her overall well-being. Patient verbalized understanding of the care plan, goals, and all of today's instructions. Encouraged patient/caregiver to communicate with his/her physician and health care team about health conditions and the treatment plan.  Provided my contact information today and encouraged patient/caregiver to call me with any questions as needed.

## 2019-08-21 ENCOUNTER — LAB VISIT (OUTPATIENT)
Dept: LAB | Facility: HOSPITAL | Age: 64
End: 2019-08-21
Attending: INTERNAL MEDICINE
Payer: MEDICARE

## 2019-08-21 ENCOUNTER — OFFICE VISIT (OUTPATIENT)
Dept: RHEUMATOLOGY | Facility: CLINIC | Age: 64
End: 2019-08-21
Payer: MEDICARE

## 2019-08-21 VITALS
HEIGHT: 65 IN | WEIGHT: 195.13 LBS | BODY MASS INDEX: 32.51 KG/M2 | HEART RATE: 75 BPM | SYSTOLIC BLOOD PRESSURE: 109 MMHG | DIASTOLIC BLOOD PRESSURE: 72 MMHG

## 2019-08-21 DIAGNOSIS — I48.0 PAF (PAROXYSMAL ATRIAL FIBRILLATION): Primary | ICD-10-CM

## 2019-08-21 DIAGNOSIS — E66.9 OBESITY (BMI 30.0-34.9): ICD-10-CM

## 2019-08-21 DIAGNOSIS — M33.13 DERMATOMYOSITIS: ICD-10-CM

## 2019-08-21 LAB
ALBUMIN SERPL BCP-MCNC: 3.5 G/DL (ref 3.5–5.2)
ALP SERPL-CCNC: 64 U/L (ref 55–135)
ALT SERPL W/O P-5'-P-CCNC: 44 U/L (ref 10–44)
ANION GAP SERPL CALC-SCNC: 11 MMOL/L (ref 8–16)
AST SERPL-CCNC: 33 U/L (ref 10–40)
BASOPHILS # BLD AUTO: 0.03 K/UL (ref 0–0.2)
BASOPHILS NFR BLD: 0.2 % (ref 0–1.9)
BILIRUB SERPL-MCNC: 0.4 MG/DL (ref 0.1–1)
BUN SERPL-MCNC: 40 MG/DL (ref 8–23)
CALCIUM SERPL-MCNC: 9.9 MG/DL (ref 8.7–10.5)
CHLORIDE SERPL-SCNC: 102 MMOL/L (ref 95–110)
CO2 SERPL-SCNC: 29 MMOL/L (ref 23–29)
CREAT SERPL-MCNC: 1.4 MG/DL (ref 0.5–1.4)
CRP SERPL-MCNC: 1.1 MG/L (ref 0–8.2)
DIFFERENTIAL METHOD: ABNORMAL
EOSINOPHIL # BLD AUTO: 0 K/UL (ref 0–0.5)
EOSINOPHIL NFR BLD: 0.2 % (ref 0–8)
ERYTHROCYTE [DISTWIDTH] IN BLOOD BY AUTOMATED COUNT: 13.9 % (ref 11.5–14.5)
ERYTHROCYTE [SEDIMENTATION RATE] IN BLOOD BY WESTERGREN METHOD: 40 MM/HR (ref 0–23)
EST. GFR  (AFRICAN AMERICAN): >60 ML/MIN/1.73 M^2
EST. GFR  (NON AFRICAN AMERICAN): 53 ML/MIN/1.73 M^2
GLUCOSE SERPL-MCNC: 108 MG/DL (ref 70–110)
HCT VFR BLD AUTO: 42 % (ref 40–54)
HGB BLD-MCNC: 13.1 G/DL (ref 14–18)
IMM GRANULOCYTES # BLD AUTO: 0.09 K/UL (ref 0–0.04)
IMM GRANULOCYTES NFR BLD AUTO: 0.5 % (ref 0–0.5)
LYMPHOCYTES # BLD AUTO: 2.8 K/UL (ref 1–4.8)
LYMPHOCYTES NFR BLD: 15.4 % (ref 18–48)
MCH RBC QN AUTO: 28.8 PG (ref 27–31)
MCHC RBC AUTO-ENTMCNC: 31.2 G/DL (ref 32–36)
MCV RBC AUTO: 92 FL (ref 82–98)
MONOCYTES # BLD AUTO: 0.9 K/UL (ref 0.3–1)
MONOCYTES NFR BLD: 5 % (ref 4–15)
NEUTROPHILS # BLD AUTO: 14.5 K/UL (ref 1.8–7.7)
NEUTROPHILS NFR BLD: 79.2 % (ref 38–73)
NRBC BLD-RTO: 0 /100 WBC
PLATELET # BLD AUTO: 153 K/UL (ref 150–350)
PMV BLD AUTO: 9.6 FL (ref 9.2–12.9)
POTASSIUM SERPL-SCNC: 4.7 MMOL/L (ref 3.5–5.1)
PROT SERPL-MCNC: 7.4 G/DL (ref 6–8.4)
RBC # BLD AUTO: 4.55 M/UL (ref 4.6–6.2)
SODIUM SERPL-SCNC: 142 MMOL/L (ref 136–145)
WBC # BLD AUTO: 18.25 K/UL (ref 3.9–12.7)

## 2019-08-21 PROCEDURE — 99215 OFFICE O/P EST HI 40 MIN: CPT | Mod: HCNC,S$GLB,, | Performed by: INTERNAL MEDICINE

## 2019-08-21 PROCEDURE — 80053 COMPREHEN METABOLIC PANEL: CPT | Mod: HCNC

## 2019-08-21 PROCEDURE — 86140 C-REACTIVE PROTEIN: CPT | Mod: HCNC

## 2019-08-21 PROCEDURE — 99999 PR PBB SHADOW E&M-EST. PATIENT-LVL V: CPT | Mod: PBBFAC,HCNC,, | Performed by: INTERNAL MEDICINE

## 2019-08-21 PROCEDURE — 99215 PR OFFICE/OUTPT VISIT, EST, LEVL V, 40-54 MIN: ICD-10-PCS | Mod: HCNC,S$GLB,, | Performed by: INTERNAL MEDICINE

## 2019-08-21 PROCEDURE — 3078F DIAST BP <80 MM HG: CPT | Mod: HCNC,CPTII,S$GLB, | Performed by: INTERNAL MEDICINE

## 2019-08-21 PROCEDURE — 3074F SYST BP LT 130 MM HG: CPT | Mod: HCNC,CPTII,S$GLB, | Performed by: INTERNAL MEDICINE

## 2019-08-21 PROCEDURE — 85025 COMPLETE CBC W/AUTO DIFF WBC: CPT | Mod: HCNC

## 2019-08-21 PROCEDURE — 3078F PR MOST RECENT DIASTOLIC BLOOD PRESSURE < 80 MM HG: ICD-10-PCS | Mod: HCNC,CPTII,S$GLB, | Performed by: INTERNAL MEDICINE

## 2019-08-21 PROCEDURE — 3074F PR MOST RECENT SYSTOLIC BLOOD PRESSURE < 130 MM HG: ICD-10-PCS | Mod: HCNC,CPTII,S$GLB, | Performed by: INTERNAL MEDICINE

## 2019-08-21 PROCEDURE — 36415 COLL VENOUS BLD VENIPUNCTURE: CPT | Mod: HCNC

## 2019-08-21 PROCEDURE — 85652 RBC SED RATE AUTOMATED: CPT | Mod: HCNC

## 2019-08-21 PROCEDURE — 3008F PR BODY MASS INDEX (BMI) DOCUMENTED: ICD-10-PCS | Mod: HCNC,CPTII,S$GLB, | Performed by: INTERNAL MEDICINE

## 2019-08-21 PROCEDURE — 99999 PR PBB SHADOW E&M-EST. PATIENT-LVL V: ICD-10-PCS | Mod: PBBFAC,HCNC,, | Performed by: INTERNAL MEDICINE

## 2019-08-21 PROCEDURE — 3008F BODY MASS INDEX DOCD: CPT | Mod: HCNC,CPTII,S$GLB, | Performed by: INTERNAL MEDICINE

## 2019-08-21 RX ORDER — AZATHIOPRINE 50 MG/1
50 TABLET ORAL 2 TIMES DAILY
Qty: 180 TABLET | Refills: 1 | Status: SHIPPED | OUTPATIENT
Start: 2019-08-21 | End: 2020-03-11 | Stop reason: SDUPTHER

## 2019-08-21 RX ORDER — PREDNISONE 10 MG/1
15 TABLET ORAL DAILY
Qty: 50 TABLET | Refills: 1 | Status: SHIPPED | OUTPATIENT
Start: 2019-08-21 | End: 2020-02-21

## 2019-08-21 NOTE — PROGRESS NOTES
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, with long-term current use of insulin    SANDRITA on CPAP    Moderate asthma    Psychophysiological insomnia    Nightmares    Sleep related gastroesophageal reflux disease    Inadequate sleep hygiene    PAF (paroxysmal atrial fibrillation)    At risk for medication noncompliance    Obesity (BMI 30.0-34.9)    Indeterminate stage chronic open angle glaucoma    Elevated troponin    Right hip pain    Gait instability    Chronic right shoulder pain    Arthritis    Left sided sciatica    Dyspnea on exertion    Osteoarthritis of spine with radiculopathy, lumbar region    HNP (herniated nucleus pulposus), lumbar    Gastroesophageal reflux disease without esophagitis    Chronic diastolic heart failure    Hypogonadism in male    Disorder of parathyroid gland    Hyperlipidemia associated with type 2 diabetes mellitus    Atrial fibrillation with RVR       Here for follow-up   Few weeks back he was seen for dermatomyositis with diffuse rash all over    Currently he is on prednisone 20 mg a day, did not taper as recommended  Rash completely resolved  Malignancy workup negative including CT chest, abdomen, pelvis  PSA normal  Sumanth marker panel negative  AMOL negative, normal CK and aldolase  Ariane marker panel negative  HIV, hep panel negative    He is currently on Imuran 50 mg a day and  prednisone 20 mg a day  He is doing well, no further rash noted  But he has noticed some hypopigmented patches 2 on the face and few on upper extremities forearms    He has history of some shortness of breath  Nonsmoker  CT chest only somewhat mosaic  attenuation no ILD pattern      History   he was previously seen in our clinic by Dr. Jimenez  For suspected polymyalgia rheumatica/ GCA ,  But then biopsy was negative and he was tapered off steroids   in May he presented to Dermatology Clinic with a diffuse erythematous itchy rash involving  Most of the body   it involved upper arms,  Legs-  Below-knee,  Chest wall and abdominal wall,  Sparing of back   face, scalp around the eyes    it is very itchy and scaly   biopsy was done by Dermatology,  This was suggestive of interface dermatitis   most consistent with dermatomyositis   other differential MCTD versus SLE     he denies any muscle weakness,  Normal CK and aldolase   denies any fever chills, weight loss     he was ordered prednisone 60 mg a day by dermatology but he has not started yet as he did not receive the medication from mail-order pharmacy     no family history of autoimmune disease   nonsmoker   denies any chest pain or shortness of breath    Past Medical History:   Diagnosis Date    Acute kidney injury     Arthritis     Asthma     Atrial fibrillation     CHF (congestive heart failure)     Depression     Diabetes mellitus, type 2 Diagnosed in 2000    Elevated PSA     Hypertension     Sleep apnea        Past Surgical History:   Procedure Laterality Date    BIOPSY-ARTERY-TEMPORAL Left 9/1/2017    Per[facility] Bishop [facility] at [facility]    CHOLECYSTECTOMY      CHOLECYSTECTOMY-LAPAROSCOPIC N/A 3/17/2017    Performed by Louis O. Jeansonne IV, MD at [facility] OR         Social History     Tobacco Use    Smoking status: Never Smoker    Smokeless tobacco: Never Used   Substance Use Topics    Alcohol use: No    Drug use: No       Family History    Problem Relation Age of Onset    Glaucoma Mother     Cataracts Mother     Cataracts Father     Glaucoma Sister     Cataracts Sister     Glaucoma Maternal Aunt     No Known Problems Brother     No Known Problems Daughter     No Known Problems Son     Prostate cancer Neg Hx        Review of patient's allergies indicates:  No Known Allergies      Statin use:  No  Calcium supplements :  Vitamin d supplements :  1 tablet a day    DEXA :  Medications :  Exercise :    Review of Systems:  Constitutional: Denies fever, chills. No recent weight changes.   Fatigue: no   Muscle weakness: no  Headaches: no new headaches  Eyes: No redness or dryness.  No recent visual changes.  ENT: Denies dry mouth. No oral or nasal ulcers.  Card: No chest pain.  Resp: No cough   Gastro: No nausea or vomiting.  No heartburn.  Constipation: no  Diarrhea: no  Genito:  No dysuria.  No genital ulcers.  Skin:  Resolved  Raynauds:no  Neuro:  none  Psych: No depression, anxiety  Endo:  no excess thirst.  Heme: no abnormal bleeding or bruising  Clots:none   Miscarriages : none     OBJECTIVE:     Vital Signs   Vitals:    08/21/19 1137   BP: 109/72   Pulse: 75     Physical Exam:  General Appearance:  NAD.   Gait: not favoring.  HEENT: PERRL.  Eyes not dry or injected.  No nasal ulcers.  Mouth not dry, no oral lesions.  Lymph: cervical, supraclavicular or axillary nodes: none abnormal   Cardio: no irregularity of S1 or S2.  No gallops or rubs.   Resp: Normal respiratory motion. Clear to auscultation bilaterally.   No abnormal chest conformation.  Abd: Soft, non-tender, nondistended.  No masses.   Skin: Head and neck,  and extremities examined. +Neuro: Ox3.   Cranial nerves II-XII grossly intact.   Rash resolved  Few hypopigmented patches noted on both forearms, 2 small patches on cheeks    Musculoskeletal Exam:    No synovitis     L spine facet tenderness       Muscle strength:Equal and full in all mm groups of the upper and lower  ext.    Laboratory:   Results for orders placed or performed in visit on 08/21/19   CBC auto differential   Result Value Ref Range    WBC 18.25 (H) 3.90 - 12.70 K/uL    RBC 4.55 (L) 4.60 - 6.20 M/uL    Hemoglobin 13.1 (L) 14.0 - 18.0 g/dL    Hematocrit 42.0 40.0 - 54.0 %    Mean Corpuscular Volume 92 82 - 98 fL    Mean Corpuscular Hemoglobin 28.8 27.0 - 31.0 pg    Mean Corpuscular Hemoglobin Conc 31.2 (L) 32.0 - 36.0 g/dL    RDW 13.9 11.5 - 14.5 %    Platelets 153 150 - 350 K/uL    MPV 9.6 9.2 - 12.9 fL    Immature Granulocytes 0.5 0.0 - 0.5 %    Gran # (ANC) 14.5 (H) 1.8 - 7.7 K/uL    Immature Grans (Abs) 0.09 (H) 0.00 - 0.04 K/uL    Lymph # 2.8 1.0 - 4.8 K/uL    Mono # 0.9 0.3 - 1.0 K/uL    Eos # 0.0 0.0 - 0.5 K/uL    Baso # 0.03 0.00 - 0.20 K/uL    nRBC 0 0 /100 WBC    Gran% 79.2 (H) 38.0 - 73.0 %    Lymph% 15.4 (L) 18.0 - 48.0 %    Mono% 5.0 4.0 - 15.0 %    Eosinophil% 0.2 0.0 - 8.0 %    Basophil% 0.2 0.0 - 1.9 %    Differential Method Automated    Comprehensive metabolic panel   Result Value Ref Range    Sodium 142 136 - 145 mmol/L    Potassium 4.7 3.5 - 5.1 mmol/L    Chloride 102 95 - 110 mmol/L    CO2 29 23 - 29 mmol/L    Glucose 108 70 - 110 mg/dL    BUN, Bld 40 (H) 8 - 23 mg/dL    Creatinine 1.4 0.5 - 1.4 mg/dL    Calcium 9.9 8.7 - 10.5 mg/dL    Total Protein 7.4 6.0 - 8.4 g/dL    Albumin 3.5 3.5 - 5.2 g/dL    Total Bilirubin 0.4 0.1 - 1.0 mg/dL    Alkaline Phosphatase 64 55 - 135 U/L    AST 33 10 - 40 U/L    ALT 44 10 - 44 U/L    Anion Gap 11 8 - 16 mmol/L    eGFR if African American >60 >60 mL/min/1.73 m^2    eGFR if non African American 53 (A) >60 mL/min/1.73 m^2     Imaging :  CT chest abdomen pelvis    Impression       1. No evidence to suggest malignancy.  2. Diffuse mosaic attenuation of the lung parenchyma bilaterally which appears more prominent when compared to the CT from 03/15/2017.  3. Remaining findings as discussed above and stable when compared to prior studies.         Notes reviewed  Other  procedures:  Pathology skin reviewed    ASSESSMENT/PLAN:     PAF (paroxysmal atrial fibrillation)    Dermatomyositis  -     azaTHIOprine (IMURAN) 50 mg Tab; Take 1 tablet (50 mg total) by mouth 2 (two) times daily.  Dispense: 180 tablet; Refill: 1  -     predniSONE (DELTASONE) 10 MG tablet; Take 1.5 tablets (15 mg total) by mouth once daily.  Dispense: 50 tablet; Refill: 1    Obesity (BMI 30.0-34.9)    1: dermatomyositis sine myositis    no evidence of muscular involvement,  Normal CK and aldolase  Rash resolved  On prednisone 20 mg daily  In a week decrease to 15 mg a day      malignancy screening- had a normal colonoscopy 1 year back per him   PSA- normal   CT chest abdomen and pelvis- no evidence of any malignancy    TPMT normal  Increase Imuran to 50 mg p.o. b.i.d.    Unclear etiology of hypopigmented patches- advice he follows up with Dermatology    2:  Shortness of breath, intermittent:  No ILD changes noted on CT chest  Check PFTs  Patient is a nonsmoker    3:  Diabetes mellitus type 2:  Watch blood sugars closely while on prednisone    4:  History of AFib:  Hold if any palpitations noted in notify us immediately     4 week return with all 4    Risks vs Benefits and potential side effects of medication prescribed today were discussed with patient. Medication literature given to patient up discharge  Went over uptodate information /literature on the meds prescribed today       steroids-  Weight gain, uncontrolled blood sugars, cataracts,  palpitations discussed  Imuran- infection, malignancy, lymphoma risk discussed  Always hold for infections or prior to surgery   please notify me if any major side effects noted on steroids  Patient advised to hold DMARD and/or biologic therapy for signs of infection or for surgery. If you are unsure what to do please call our office for instruction. [facility] [phone]        Disclaimer: This note was prepared using voice recognition system and is likely  to have sound alike errors and is not proof read.  Please call me with any questions.

## 2019-08-22 ENCOUNTER — OFFICE VISIT (OUTPATIENT)
Dept: OPHTHALMOLOGY | Facility: CLINIC | Age: 64
End: 2019-08-22
Payer: MEDICARE

## 2019-08-22 DIAGNOSIS — H40.1132 PRIMARY OPEN ANGLE GLAUCOMA (POAG) OF BOTH EYES, MODERATE STAGE: Primary | ICD-10-CM

## 2019-08-22 PROCEDURE — 99999 PR PBB SHADOW E&M-EST. PATIENT-LVL II: CPT | Mod: PBBFAC,HCNC,, | Performed by: OPTOMETRIST

## 2019-08-22 PROCEDURE — 92012 PR EYE EXAM, EST PATIENT,INTERMED: ICD-10-PCS | Mod: HCNC,S$GLB,, | Performed by: OPTOMETRIST

## 2019-08-22 PROCEDURE — 92012 INTRM OPH EXAM EST PATIENT: CPT | Mod: HCNC,S$GLB,, | Performed by: OPTOMETRIST

## 2019-08-22 PROCEDURE — 99999 PR PBB SHADOW E&M-EST. PATIENT-LVL II: ICD-10-PCS | Mod: PBBFAC,HCNC,, | Performed by: OPTOMETRIST

## 2019-08-22 NOTE — PROGRESS NOTES
HPI     Last MLC exam 05/16/2019  3 month IOP Check   Glaucoma  Latanoprost 1 drop OU QHS   Last HVF/GOCT 05/16/2019  Last DFE 05/16/2019      Diabetic/IDDM      Last edited by Kemal Bashir MA on 8/22/2019 10:30 AM. (History)            Assessment /Plan     For exam results, see Encounter Report.    Primary open angle glaucoma (POAG) of both eyes, moderate stage      Good IOP OU.  RTC 3 months for IOP, HVF 24-2, and GOCT

## 2019-08-29 ENCOUNTER — OFFICE VISIT (OUTPATIENT)
Dept: INTERNAL MEDICINE | Facility: CLINIC | Age: 64
End: 2019-08-29
Payer: MEDICARE

## 2019-08-29 ENCOUNTER — OUTPATIENT CASE MANAGEMENT (OUTPATIENT)
Dept: ADMINISTRATIVE | Facility: OTHER | Age: 64
End: 2019-08-29

## 2019-08-29 VITALS
BODY MASS INDEX: 32.73 KG/M2 | HEIGHT: 65 IN | TEMPERATURE: 98 F | RESPIRATION RATE: 18 BRPM | WEIGHT: 196.44 LBS | HEART RATE: 82 BPM | OXYGEN SATURATION: 98 % | SYSTOLIC BLOOD PRESSURE: 125 MMHG | DIASTOLIC BLOOD PRESSURE: 66 MMHG

## 2019-08-29 DIAGNOSIS — M79.605 LEFT LEG PAIN: ICD-10-CM

## 2019-08-29 PROCEDURE — 3078F PR MOST RECENT DIASTOLIC BLOOD PRESSURE < 80 MM HG: ICD-10-PCS | Mod: HCNC,CPTII,S$GLB, | Performed by: FAMILY MEDICINE

## 2019-08-29 PROCEDURE — 99214 OFFICE O/P EST MOD 30 MIN: CPT | Mod: HCNC,S$GLB,, | Performed by: FAMILY MEDICINE

## 2019-08-29 PROCEDURE — 99999 PR PBB SHADOW E&M-EST. PATIENT-LVL III: CPT | Mod: PBBFAC,HCNC,, | Performed by: FAMILY MEDICINE

## 2019-08-29 PROCEDURE — 99214 PR OFFICE/OUTPT VISIT, EST, LEVL IV, 30-39 MIN: ICD-10-PCS | Mod: HCNC,S$GLB,, | Performed by: FAMILY MEDICINE

## 2019-08-29 PROCEDURE — 3046F PR MOST RECENT HEMOGLOBIN A1C LEVEL > 9.0%: ICD-10-PCS | Mod: HCNC,CPTII,S$GLB, | Performed by: FAMILY MEDICINE

## 2019-08-29 PROCEDURE — 3008F BODY MASS INDEX DOCD: CPT | Mod: HCNC,CPTII,S$GLB, | Performed by: FAMILY MEDICINE

## 2019-08-29 PROCEDURE — 3078F DIAST BP <80 MM HG: CPT | Mod: HCNC,CPTII,S$GLB, | Performed by: FAMILY MEDICINE

## 2019-08-29 PROCEDURE — 3046F HEMOGLOBIN A1C LEVEL >9.0%: CPT | Mod: HCNC,CPTII,S$GLB, | Performed by: FAMILY MEDICINE

## 2019-08-29 PROCEDURE — 99999 PR PBB SHADOW E&M-EST. PATIENT-LVL III: ICD-10-PCS | Mod: PBBFAC,HCNC,, | Performed by: FAMILY MEDICINE

## 2019-08-29 PROCEDURE — 3074F PR MOST RECENT SYSTOLIC BLOOD PRESSURE < 130 MM HG: ICD-10-PCS | Mod: HCNC,CPTII,S$GLB, | Performed by: FAMILY MEDICINE

## 2019-08-29 PROCEDURE — 3074F SYST BP LT 130 MM HG: CPT | Mod: HCNC,CPTII,S$GLB, | Performed by: FAMILY MEDICINE

## 2019-08-29 PROCEDURE — 3008F PR BODY MASS INDEX (BMI) DOCUMENTED: ICD-10-PCS | Mod: HCNC,CPTII,S$GLB, | Performed by: FAMILY MEDICINE

## 2019-08-29 NOTE — PROGRESS NOTES
Subjective:       Patient ID: Crow Green is a 64 y.o. male.    Chief Complaint: Follow-up (1 month HTN/ozempic)    Diabetes   He presents for his follow-up diabetic visit. He has type 2 diabetes mellitus. His disease course has been stable. Pertinent negatives for hypoglycemia include no confusion, dizziness, headaches or hunger. Pertinent negatives for diabetes include no blurred vision, no chest pain, no fatigue and no weakness. Pertinent negatives for hypoglycemia complications include no blackouts and no hospitalization. Symptoms are stable. Risk factors for coronary artery disease include diabetes mellitus, hypertension and male sex. Current diabetic treatments: glp1, insulin. He is compliant with treatment all of the time. His weight is stable. He is following a diabetic diet. There is no change in his home blood glucose trend. An ACE inhibitor/angiotensin II receptor blocker is being taken. Eye exam is current.     Review of Systems   Constitutional: Negative for fatigue.   Eyes: Negative for blurred vision.   Cardiovascular: Negative for chest pain.   Neurological: Negative for dizziness, weakness and headaches.   Psychiatric/Behavioral: Negative for confusion.       Objective:      Physical Exam   Constitutional: He appears well-developed and well-nourished. No distress.   HENT:   Head: Normocephalic and atraumatic.   Pulmonary/Chest: Effort normal and breath sounds normal. No respiratory distress. He has no wheezes.   Abdominal: Soft. He exhibits no distension. There is no tenderness. There is no guarding.   Musculoskeletal:   Limited ROM to LLE. NTTP   Skin: Skin is warm and dry. No rash noted. He is not diaphoretic. No erythema.   Nursing note and vitals reviewed.      Assessment:       1. 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

## 2019-09-05 NOTE — TELEPHONE ENCOUNTER
----- Message from Dang Clay RN sent at 9/5/2019  1:15 PM CDT -----  Contact: Dang Lambert/Staff,    I spoke with Mr Green this morning and he has no refills available for his glucometer strips. I checked his med list and there is currently not a script for them listed. He has a True Metrix Air glucometer.     Thank you,     Dang Clay RN, [facility]  Outpatient Case Management  [phone]  Ext [phone]  [email or web address]

## 2019-09-05 NOTE — TELEPHONE ENCOUNTER
----- Message from Anna Bashir sent at 9/5/2019  9:26 AM CDT -----  Contact: pt   Pt is requesting a call back in regards to his strips.    [phone] (home) [phone] (work)

## 2019-09-05 NOTE — PROGRESS NOTES
9/5/19 - SUMMARY: Phone contact made with pt today for follow up with [facility]. He is currently at [facility]. He has begun to receive meals from [employer] in the mail for his Chronic Condition Meals benefit. He reports his rash is better, but not resolved. He continues to take prednisone for it and it does not seem to be causing elevations in his blood sugar. He reports his blood sugars yesterday and so far today have been 96, 96, 119 and 127. He also advised his appetite has not been negatively affected by it and he is usually eating one regular and one small meal daily. He is taking all meds as prescribed and tries to follow both heart healthy and low carb restrictions most of the time. He does report he is almost out of glucometer strips and was told it is too soon to have them refilled.   INTERVENTION: Discussed frequency of finger sticks and he advised he is checking blood sugar 6 X day. Encouraged him to decrease this to no more than 4 X day as that is the frequency his script if ordered to cover. Reviewed new med for diabetes that is to be injected weekly and he advised he has rec'd it from [facility] and started using it. Questioned about med compliance with all po meds and he advised he has a nurse visiting weekly that is setting up his meds in an organizer for a week at a time and he takes them as set up. He is unsure if she is a home health nurse or how long she will be coming to his home to do this. Advised this CM will message his PCP to ask if a new script for glucometer strips needs to be sent to [facility] as there is not currently a script on his chart for them. Questioned about outpt therapy referral seen on chart from 8/29/19 appt with his PCP. He advised he missed a call from the therapy dept and he does not know how to call them back. This CM contacted outpt therapy at [facility] and was told they left a message for pt. This CM texted their contact number to pt as he agreed to receive texted number so he can return  their call. Advised this CM will follow up with him in two weeks and will plan to d/c from [facility] at that time if no new problems or concerns. Reviewed upcoming lab tests and need to fast after going to bed the night before until after blood is drawn. He verbalized understanding.   PLAN: Discuss med compliance and who will assist with med set up when home health nurse d/c's him. Did he get a new script of glucometer strips delivered? Does he have hypo or hyperglycemia symptoms? Has he started PT? Review ways to control CAD, CHF and DM and symptoms of exacerbation of CHF or CAD. Review recent Hgb A1c and FLP results.    Todays [facility] Self-Management Care Plan was reviewed with the patients/caregivers input based on identified barriers from todays and previous assessments.  Goals were reviewed today with the patient/caregiver and the patient has agreed to continue to work towards these goals to improve his/her overall well-being. Patient verbalized understanding of the care plan, goals, and all of today's instructions. Encouraged patient/caregiver to communicate with his/her physician and health care team about health conditions and the treatment plan.  Provided my contact information today and encouraged patient/caregiver to call me with any questions as needed. Dang Clay RN

## 2019-09-09 ENCOUNTER — LAB VISIT (OUTPATIENT)
Dept: LAB | Facility: HOSPITAL | Age: 64
End: 2019-09-09
Payer: MEDICARE

## 2019-09-09 ENCOUNTER — NURSE TRIAGE (OUTPATIENT)
Dept: ADMINISTRATIVE | Facility: CLINIC | Age: 64
End: 2019-09-09

## 2019-09-09 ENCOUNTER — OFFICE VISIT (OUTPATIENT)
Dept: PODIATRY | Facility: CLINIC | Age: 64
End: 2019-09-09
Payer: MEDICARE

## 2019-09-09 VITALS
BODY MASS INDEX: 32.73 KG/M2 | HEIGHT: 65 IN | SYSTOLIC BLOOD PRESSURE: 91 MMHG | DIASTOLIC BLOOD PRESSURE: 55 MMHG | HEART RATE: 43 BPM | WEIGHT: 196.44 LBS

## 2019-09-09 DIAGNOSIS — E11.49 TYPE II DIABETES MELLITUS WITH NEUROLOGICAL MANIFESTATIONS: Primary | ICD-10-CM

## 2019-09-09 DIAGNOSIS — B35.1 DERMATOPHYTOSIS OF NAIL: ICD-10-CM

## 2019-09-09 LAB
CHOLEST SERPL-MCNC: 132 MG/DL (ref 120–199)
CHOLEST/HDLC SERPL: 3.4 {RATIO} (ref 2–5)
HDLC SERPL-MCNC: 39 MG/DL (ref 40–75)
HDLC SERPL: 29.5 % (ref 20–50)
LDLC SERPL CALC-MCNC: 64.2 MG/DL (ref 63–159)
NONHDLC SERPL-MCNC: 93 MG/DL
TRIGL SERPL-MCNC: 144 MG/DL (ref 30–150)

## 2019-09-09 PROCEDURE — 11721 PR DEBRIDEMENT OF NAILS, 6 OR MORE: ICD-10-PCS | Mod: Q9,HCNC,S$GLB, | Performed by: PODIATRIST

## 2019-09-09 PROCEDURE — 99499 UNLISTED E&M SERVICE: CPT | Mod: HCNC,S$GLB,, | Performed by: PODIATRIST

## 2019-09-09 PROCEDURE — 80061 LIPID PANEL: CPT | Mod: HCNC

## 2019-09-09 PROCEDURE — 99999 PR PBB SHADOW E&M-EST. PATIENT-LVL III: CPT | Mod: PBBFAC,HCNC,, | Performed by: PODIATRIST

## 2019-09-09 PROCEDURE — 99999 PR PBB SHADOW E&M-EST. PATIENT-LVL III: ICD-10-PCS | Mod: PBBFAC,HCNC,, | Performed by: PODIATRIST

## 2019-09-09 PROCEDURE — 99499 RISK ADDL DX/OHS AUDIT: ICD-10-PCS | Mod: HCNC,S$GLB,, | Performed by: PODIATRIST

## 2019-09-09 PROCEDURE — 11721 DEBRIDE NAIL 6 OR MORE: CPT | Mod: Q9,HCNC,S$GLB, | Performed by: PODIATRIST

## 2019-09-09 PROCEDURE — 36415 COLL VENOUS BLD VENIPUNCTURE: CPT | Mod: HCNC

## 2019-09-09 NOTE — PROGRESS NOTES
Subjective:     Patient ID: Crow Green is a 64 y.o. male.    Chief Complaint: Nail Care (RNC. c/o no pain. Diabetic Pt. Wears tennis shoes. PCP DR Lambert, last visit 6/27/19)    Crow is a 64 y.o. male who presents to the clinic for evaluation and treatment of high risk feet. Crow has a past medical history of Acute kidney injury, Arthritis, Asthma, Atrial fibrillation, CHF (congestive heart failure), Depression, Diabetes mellitus, type 2 (Diagnosed in 2000), Elevated PSA, Hypertension, and Sleep apnea. The patient's chief complaint is long, thick toenails.  This patient has documented high risk feet requiring routine maintenance secondary to diabetes mellitis and those secondary complications of diabetes, as mentioned..    PCP: Mateo Lambert, DO    Date Last Seen by PCP: 06/27/19    Current shoe gear:  Affected Foot: Tennis shoes     Unaffected Foot: Tennis shoes    Hemoglobin A1C   Date Value Ref Range Status   08/08/2019 9.7 (H) 4.0 - 5.6 % Final     Comment:     ADA Screening Guidelines:  5.7-6.4%  Consistent with prediabetes  >or=6.5%  Consistent with diabetes  High levels of fetal hemoglobin interfere with the HbA1C  assay. Heterozygous hemoglobin variants (HbS, HgC, etc)do  not significantly interfere with this assay.   However, presence of multiple variants may affect accuracy.     06/27/2019 9.8 (H) 4.0 - 5.6 % Final     Comment:     ADA Screening Guidelines:  5.7-6.4%  Consistent with prediabetes  >or=6.5%  Consistent with diabetes  High levels of fetal hemoglobin interfere with the HbA1C  assay. Heterozygous hemoglobin variants (HbS, HgC, etc)do  not significantly interfere with this assay.   However, presence of multiple variants may affect accuracy.     03/20/2019 10.8 (H) 4.0 - 5.6 % Final     Comment:     ADA Screening Guidelines:  5.7-6.4%  Consistent with prediabetes  >or=6.5%  Consistent with diabetes  High levels of fetal hemoglobin interfere with the HbA1C  assay. Heterozygous  hemoglobin variants (HbS, HgC, etc)do  not significantly interfere with this assay.   However, presence of multiple variants may affect accuracy.             Patient Active Problem List   Diagnosis    ED (erectile dysfunction)    Testicular hypogonadism    Non-proliferative diabetic retinopathy, mild, both eyes    Hypertension associated with diabetes    Diabetic polyneuropathy    Coronary artery disease of native artery of native heart with stable angina pectoris    Chronic systolic congestive heart failure    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, with long-term current use of insulin    SANDRITA on CPAP    Moderate asthma    Psychophysiological insomnia    Nightmares    Sleep related gastroesophageal reflux disease    Inadequate sleep hygiene    PAF (paroxysmal atrial fibrillation)    At risk for medication noncompliance    Obesity (BMI 30.0-34.9)    Indeterminate stage chronic open angle glaucoma    Elevated troponin    Right hip pain    Gait instability    Chronic right shoulder pain    Arthritis    Left sided sciatica    Dyspnea on exertion    Osteoarthritis of spine with radiculopathy, lumbar region    HNP (herniated nucleus pulposus), lumbar    Gastroesophageal reflux disease without esophagitis    Chronic diastolic heart failure    Hypogonadism in male    Disorder of parathyroid gland    Hyperlipidemia associated with type 2 diabetes mellitus    Atrial fibrillation with RVR    Left leg pain       Medication List with Changes/Refills   Current Medications    ALCOHOL ANTISEPTIC PADS (ALCOHOL PREP PADS TOP)        ASPIRIN (ECOTRIN) 81 MG EC TABLET    Take 1 tablet (81 mg total) by mouth once daily.    ATORVASTATIN (LIPITOR) 40 MG TABLET    Take 1 tablet (40 mg total) by mouth every evening.    AZATHIOPRINE (IMURAN) 50 MG TAB    Take 1 tablet (50 mg total) by mouth 2 (two) times daily.    BACLOFEN (LIORESAL) 10 MG TABLET     Take 1 tablet (10 mg total) by mouth once daily.    BLOOD SUGAR DIAGNOSTIC (TRUE METRIX GLUCOSE TEST STRIP) STRP    Use with blood glucose meter kit to test blood sugar four times  daily    BLOOD-GLUCOSE METER (TRUE METRIX AIR GLUCOSE METER) KIT    TEST FOUR TIMES DAILY BEFORE MEALS  AND EVERY NIGHT    DILTIAZEM (CARDIZEM SR) 60 MG CP12    Take 1 capsule (60 mg total) by mouth 2 (two) times daily.    ELIQUIS 5 MG TAB        FLUTICASONE-SALMETEROL 250-50 MCG/DOSE (ADVAIR DISKUS) 250-50 MCG/DOSE DISKUS INHALER    Inhale 1 puff into the lungs 2 (two) times daily. Controller    FUROSEMIDE (LASIX) 40 MG TABLET    Take 2 tablets (80 mg total) by mouth every morning AND 1 tablet (40 mg total) every evening.    GABAPENTIN (NEURONTIN) 800 MG TABLET    Take 1 tablet (800 mg total) by mouth 3 (three) times daily.    GLIMEPIRIDE (AMARYL) 2 MG TABLET    Take 1 tablet (2 mg total) by mouth before breakfast.    INHALATION SPACING DEVICE    Use as directed for inhalation.    INSULIN (LANTUS SOLOSTAR U-100 INSULIN) GLARGINE 100 UNITS/ML (3ML) SUBQ PEN    Inject 80 Units into the skin every evening.    INSULIN ASPART U-100 (NOVOLOG FLEXPEN U-100 INSULIN) 100 UNIT/ML (3 ML) INPN PEN    INJECT 20 UNITS SUBCUTANEOUSLY THREE TIMES DAILY WITH MEALS    INSULIN SYRINGE-NEEDLE U-100 1 ML 31 GAUGE X 5/16 SYRG        IPRATROPIUM-ALBUTEROL (COMBIVENT)  MCG/ACTUATION INHALER    Inhale 1 puff into the lungs 4 (four) times daily. Rescue    ISOSORBIDE MONONITRATE (IMDUR) 60 MG 24 HR TABLET    Take 1 tablet (60 mg total) by mouth once daily.    LANCETS 32 GAUGE MISC    1 lancet by Misc.(Non-Drug; Combo Route) route 2 (two) times daily.    LATANOPROST 0.005 % OPHTHALMIC SOLUTION    Place 1 drop into both eyes every evening.    LISINOPRIL 10 MG TABLET    Take 1 tablet (10 mg total) by mouth once daily.    METFORMIN (GLUCOPHAGE) 1000 MG TABLET    Take 1 tablet (1,000 mg total) by mouth 2 (two) times daily with meals.    METOLAZONE (ZAROXOLYN)  "2.5 MG TABLET    Take 1 tablet (2.5 mg total) by mouth every Mon, Wed, Fri.    METOPROLOL SUCCINATE (TOPROL-XL) 100 MG 24 HR TABLET    Take 1 tablet (100 mg total) by mouth once daily.    NITROGLYCERIN (NITROSTAT) 0.3 MG SL TABLET    PLACE 1 TABLET UNDER THE TONGUE EVERY 5 MINUTES AS NEEDED FOR CHEST PAIN, NO MORE THAN 3 TABLETS IN ONE DAY    PANTOPRAZOLE (PROTONIX) 40 MG TABLET    Take 1 tablet (40 mg total) by mouth once daily.    PEN NEEDLE, DIABETIC (BD ULTRA-FINE SHORT PEN NEEDLE) 31 GAUGE X 5/16" NDLE    Inject 1 each into the skin 3 (three) times daily.    PREDNISONE (DELTASONE) 10 MG TABLET    Take 1.5 tablets (15 mg total) by mouth once daily.    SEMAGLUTIDE (OZEMPIC) 0.25 MG OR 0.5 MG(2 MG/1.5 ML) PNIJ    Inject 0.5 mg into the skin every 7 days.    SERTRALINE (ZOLOFT) 50 MG TABLET    TAKE 1 TABLET ONE TIME DAILY    TIOTROPIUM (SPIRIVA WITH HANDIHALER) 18 MCG INHALATION CAPSULE    Inhale 1 capsule (18 mcg total) into the lungs once daily. Controller    TRAZODONE (DESYREL) 150 MG TABLET    TAKE 1 TABLET EVERY NIGHT       Review of patient's allergies indicates:  No Known Allergies    Past Surgical History:   Procedure Laterality Date    BIOPSY-ARTERY-TEMPORAL Left 9/1/2017    Performed by [facility]in Bishop [facility] at [facility]    CHOLECYSTECTOMY      CHOLECYSTECTOMY-LAPAROSCOPIC N/A 3/17/2017    Performed by Louis O. Jeansonne IV, MD at [facility]       Family History   Problem Relation Age of Onset    Glaucoma Mother     Cataracts Mother     Cataracts Father     Glaucoma Sister     Cataracts Sister     Glaucoma Maternal Aunt     No Known Problems Brother     No Known Problems Daughter     No Known Problems Son     Prostate cancer Neg Hx        Social History     Socioeconomic History    Marital status: Legally      Spouse name: Not on file    Number of children: 5    Years of education: Not on file    Highest education level: Not on file   Occupational History    Occupation: disabled   Social " "Needs    Financial resource strain: Not on file    Food insecurity:     Worry: Not on file     Inability: Not on file    Transportation needs:     Medical: Not on file     Non-medical: Not on file   Tobacco Use    Smoking status: Never Smoker    Smokeless tobacco: Never Used   Substance and Sexual Activity    Alcohol use: No    Drug use: No    Sexual activity: Not Currently   Lifestyle    Physical activity:     Days per week: Not on file     Minutes per session: Not on file    Stress: Rather much   Relationships    Social connections:     Talks on phone: Not on file     Gets together: Not on file     Attends Worship service: Not on file     Active member of club or organization: Not on file     Attends meetings of clubs or organizations: Not on file     Relationship status: Not on file   Other Topics Concern    Not on file   Social History Narrative    Not on file       Vitals:    09/09/19 1038   BP: (!) 91/55   Pulse: (!) 43   Weight: 89.1 kg (196 lb 6.9 oz)   Height: 5' 5" (1.651 m)   PainSc: 0-No pain       Hemoglobin A1C   Date Value Ref Range Status   08/08/2019 9.7 (H) 4.0 - 5.6 % Final     Comment:     ADA Screening Guidelines:  5.7-6.4%  Consistent with prediabetes  >or=6.5%  Consistent with diabetes  High levels of fetal hemoglobin interfere with the HbA1C  assay. Heterozygous hemoglobin variants (HbS, HgC, etc)do  not significantly interfere with this assay.   However, presence of multiple variants may affect accuracy.     06/27/2019 9.8 (H) 4.0 - 5.6 % Final     Comment:     ADA Screening Guidelines:  5.7-6.4%  Consistent with prediabetes  >or=6.5%  Consistent with diabetes  High levels of fetal hemoglobin interfere with the HbA1C  assay. Heterozygous hemoglobin variants (HbS, HgC, etc)do  not significantly interfere with this assay.   However, presence of multiple variants may affect accuracy.     03/20/2019 10.8 (H) 4.0 - 5.6 % Final     Comment:     ADA Screening Guidelines:  5.7-6.4%  " Consistent with prediabetes  >or=6.5%  Consistent with diabetes  High levels of fetal hemoglobin interfere with the HbA1C  assay. Heterozygous hemoglobin variants (HbS, HgC, etc)do  not significantly interfere with this assay.   However, presence of multiple variants may affect accuracy.         Review of Systems   Constitutional: Negative for chills and fever.   Respiratory: Negative for shortness of breath.    Cardiovascular: Negative for chest pain, palpitations, orthopnea, claudication and leg swelling.   Gastrointestinal: Negative for diarrhea, nausea and vomiting.   Musculoskeletal: Negative for joint pain.   Skin: Negative for rash.   Neurological: Positive for tingling and sensory change. Negative for dizziness, focal weakness and weakness.   Psychiatric/Behavioral: Negative.          Objective:      PHYSICAL EXAM: Apperance: Alert and orient in no distress,well developed, and with good attention to grooming and body habits  Patient presents ambulating in tennis shoes.   LOWER EXTREMITY EXAM:  VASCULAR: Dorsalis pedis pulses 2/4 bilateral and Posterior Tibial pulses 2/4 bilateral. Capillary fill time <3 seconds bilateral. No edema observed bilateral. Varicosities absent bilateral. Skin temperature of the lower extremities is warm to warm, proximal to distal. Hair growth dim bilateral.  DERMATOLOGICAL: No skin rashes, subcutaneous nodules, lesions, or ulcers observed bilateral. Nails 1,2,3,4,5 bilateral elongated, thickened, and discolored with subungual debris. Webspaces 1,2,3,4 clean, dry and without evidence of break in skin integrity bilateral. Dry and scaly skin noted plantarly bilateral.   NEUROLOGICAL: Light touch, sharp-dull, proprioception all present and equal bilaterally.  Vibratory sensation diminished at bilateral hallux and navicular. Protective sensation absent at sites as tested with a Alex-Kimmie 5.07 monofilament.   MUSCULOSKELETAL: Muscle strength is 5/5 for foot inverters, everters,  plantarflexors, and dorsiflexors. Muscle tone is normal.         Assessment:       Encounter Diagnoses   Name Primary?    Type II diabetes mellitus with neurological manifestations Yes    Dermatophytosis of nail          Plan:   Type II diabetes mellitus with neurological manifestations    Dermatophytosis of nail      I counseled the patient on his conditions, regarding findings of my examination, my impressions, and usual treatment plan.   Greater than 15 minutes of a 20 minute appointment spent on education about the diabetic foot, neuropathy, and prevention of limb loss.  Shoe inspection. Diabetic Foot Education. Patient reminded of the importance of good nutrition and blood sugar control to help prevent podiatric complications of diabetes. Patient instructed on proper foot hygeine. We discussed wearing proper shoe gear, daily foot inspections, never walking without protective shoe gear, never putting sharp instruments to feet.    With patient's permission, nails 1-5 bilateral were debrided/trimmed in length and thickness aggressively to their soft tissue attachment mechanically and with electric , removing all offending nail and debris. Patient relates relief following the procedure.  Patient  will continue to monitor the areas daily, inspect feet, wear protective shoe gear when ambulatory, moisturizer to maintain skin integrity. Patient reminded of the importance of good nutrition and blood sugar control to help prevent podiatric complications of diabetes.  Patient to return 3 months or sooner if needed.              Barb Veras DPM  [facility]

## 2019-09-09 NOTE — TELEPHONE ENCOUNTER
103/58 pulse 40 at office earlier today. Had no symptoms  105/57 pulse 88 during call  Patient denies any symptoms   Patient ended call abruptly for a phone call he wanted to take.      Reason for Disposition   Caller has cancelled the call before the first contact.    Protocols used: ST NO CONTACT OR DUPLICATE CONTACT CALL-A-AH

## 2019-09-12 ENCOUNTER — OFFICE VISIT (OUTPATIENT)
Dept: DERMATOLOGY | Facility: CLINIC | Age: 64
End: 2019-09-12
Payer: MEDICARE

## 2019-09-12 DIAGNOSIS — M33.13 DERMATOMYOSITIS: Primary | ICD-10-CM

## 2019-09-12 PROCEDURE — 99213 PR OFFICE/OUTPT VISIT, EST, LEVL III, 20-29 MIN: ICD-10-PCS | Mod: HCNC,S$GLB,, | Performed by: STUDENT IN AN ORGANIZED HEALTH CARE EDUCATION/TRAINING PROGRAM

## 2019-09-12 PROCEDURE — 99999 PR PBB SHADOW E&M-EST. PATIENT-LVL II: CPT | Mod: PBBFAC,HCNC,, | Performed by: STUDENT IN AN ORGANIZED HEALTH CARE EDUCATION/TRAINING PROGRAM

## 2019-09-12 PROCEDURE — 99499 RISK ADDL DX/OHS AUDIT: ICD-10-PCS | Mod: HCNC,S$GLB,, | Performed by: STUDENT IN AN ORGANIZED HEALTH CARE EDUCATION/TRAINING PROGRAM

## 2019-09-12 PROCEDURE — 99499 UNLISTED E&M SERVICE: CPT | Mod: HCNC,S$GLB,, | Performed by: STUDENT IN AN ORGANIZED HEALTH CARE EDUCATION/TRAINING PROGRAM

## 2019-09-12 PROCEDURE — 99999 PR PBB SHADOW E&M-EST. PATIENT-LVL II: ICD-10-PCS | Mod: PBBFAC,HCNC,, | Performed by: STUDENT IN AN ORGANIZED HEALTH CARE EDUCATION/TRAINING PROGRAM

## 2019-09-12 PROCEDURE — 99213 OFFICE O/P EST LOW 20 MIN: CPT | Mod: HCNC,S$GLB,, | Performed by: STUDENT IN AN ORGANIZED HEALTH CARE EDUCATION/TRAINING PROGRAM

## 2019-09-12 NOTE — PROGRESS NOTES
Subjective:       Patient ID:  Crow Green is a 64 y.o. male who presents for   Chief Complaint   Patient presents with    Rash     1 month f/u, pt sees improvement in condition, no recent flare up, tx prednisone     History of Present Illness: The patient presents for follow-up of dermatomyositis. Last seen on 8/6/19. He is currently on prednisone and azathioprine from Rheumatology and reports significant improvement in symptoms. Denies any recent flares of his symptoms. Previously was erythrodermic with muscle weakness; now skin is clear, except for a few hypopigmented areas on the arms. Doing well with no complaints and overall pleased with results. No other concerning rash or skin lesions.     Rash         Review of Systems   Skin: Negative for rash.        Objective:    Physical Exam   Constitutional: He appears well-developed and well-nourished. No distress.   Neurological: He is alert and oriented to person, place, and time. He is not disoriented.   Psychiatric: He has a normal mood and affect.   Skin:   Areas Examined (abnormalities noted in diagram):   Head / Face Inspection Performed  Neck Inspection Performed  Chest / Axilla Inspection Performed  RUE Inspected  LUE Inspection Performed              Diagram Legend     Erythematous scaling macule/papule c/w actinic keratosis       Vascular papule c/w angioma      Pigmented verrucoid papule/plaque c/w seborrheic keratosis      Yellow umbilicated papule c/w sebaceous hyperplasia      Irregularly shaped tan macule c/w lentigo     1-2 mm smooth white papules consistent with Milia      Movable subcutaneous cyst with punctum c/w epidermal inclusion cyst      Subcutaneous movable cyst c/w pilar cyst      Firm pink to brown papule c/w dermatofibroma      Pedunculated fleshy papule(s) c/w skin tag(s)      Evenly pigmented macule c/w junctional nevus     Mildly variegated pigmented, slightly irregular-bordered macule c/w mildly atypical nevus      Flesh  colored to evenly pigmented papule c/w intradermal nevus       Pink pearly papule/plaque c/w basal cell carcinoma      Erythematous hyperkeratotic cursted plaque c/w SCC      Surgical scar with no sign of skin cancer recurrence      Open and closed comedones      Inflammatory papules and pustules      Verrucoid papule consistent consistent with wart     Erythematous eczematous patches and plaques     Dystrophic onycholytic nail with subungual debris c/w onychomycosis     Umbilicated papule    Erythematous-base heme-crusted tan verrucoid plaque consistent with inflamed seborrheic keratosis     Erythematous Silvery Scaling Plaque c/w Psoriasis     See annotation      Assessment / Plan:        Dermatomyositis - patient doing well on prednisone and azathioprine, managed by Rheumatology. No cutaneous manifestations of dermatomyositis. Will follow for any changes. Recommend continued current regimen at this time.              Follow up in about 6 months (around 3/12/2020).

## 2019-09-17 ENCOUNTER — TELEPHONE (OUTPATIENT)
Dept: PHARMACY | Facility: CLINIC | Age: 64
End: 2019-09-17

## 2019-09-19 ENCOUNTER — CLINICAL SUPPORT (OUTPATIENT)
Dept: REHABILITATION | Facility: HOSPITAL | Age: 64
End: 2019-09-19
Payer: MEDICARE

## 2019-09-19 ENCOUNTER — OUTPATIENT CASE MANAGEMENT (OUTPATIENT)
Dept: ADMINISTRATIVE | Facility: OTHER | Age: 64
End: 2019-09-19

## 2019-09-19 DIAGNOSIS — M79.605 LEFT LEG PAIN: Primary | ICD-10-CM

## 2019-09-19 PROCEDURE — 97162 PT EVAL MOD COMPLEX 30 MIN: CPT | Mod: HCNC

## 2019-09-19 PROCEDURE — 97014 ELECTRIC STIMULATION THERAPY: CPT | Mod: HCNC

## 2019-09-19 NOTE — PLAN OF CARE
[facility]  Physical Therapy Initial Evaluation    Name: Crow Green  Clinic Number: [id number]    Therapy Diagnosis:   Encounter Diagnosis   Name Primary?    Left leg pain Yes     Physician: Sarmad Gregorio MD    Physician Orders: PT Eval and Treat   Medical Diagnosis from Referral: M79.605 (ICD-10-CM) - Left leg pain  Evaluation Date: 9/19/2019  Authorization Period Expiration: 8/28/20  Plan of Care Expiration: 10/19/19  Visit # / Visits authorized: 1/1    Time In: 2:00  Time Out: 3:00  Total Billable Time: 60 minutes    Precautions: Standard    Subjective   Date of onset: 8/29/19  History of current condition - Crow reports: Both hips and thighs have been bothering him for a couple years. He states his low back also bothers him along with muscle cramps. He reports he gets most his hip pain when he walks and when he stands on his left side. He reports that the hip pain is pretty constant but it really increases with activity. He states he occassionally will have numbness and tingling down into the bottom of his feet. He states the left side bothers him more. He reports he has had therapy around a year ago somewhere else and he had some relief.      Medical History:   Past Medical History:   Diagnosis Date    Acute kidney injury     Arthritis     Asthma     Atrial fibrillation     CHF (congestive heart failure)     Depression     Diabetes mellitus, type 2 Diagnosed in 2000    Elevated PSA     Hypertension     Sleep apnea        Surgical History:   Crow Green  has a past surgical history that includes Cholecystectomy.    Medications:   Crow has a current medication list which includes the following prescription(s): alcohol antiseptic pads, aspirin, atorvastatin, azathioprine, baclofen, blood sugar diagnostic, blood-glucose meter, diltiazem, eliquis, fluticasone-salmeterol 250-50 mcg/dose, furosemide, gabapentin, glimepiride, inhalation spacing device,  insulin, insulin aspart u-100, insulin syringe-needle u-100, ipratropium-albuterol, isosorbide mononitrate, lancets, latanoprost, lisinopril, metformin, metolazone, metoprolol succinate, nitroglycerin, pantoprazole, pen needle, diabetic, prednisone, semaglutide, sertraline, tiotropium, and trazodone.    Allergies:   Review of patient's allergies indicates:  No Known Allergies     Imaging, X-ray : No change in alignment on flexion or extension views.  Similar degenerative findings noted, most prevalent at L5-S1.    Prior Therapy: a year ago, some relief   Social History:  lives with their family  Occupation: disability  Prior Level of Function: walk long distances, squat, and bend over  Current Level of Function: walking short distances    Pain:  Current 4/10, worst 10/10, best 0/10   Location: bilateral back  and hips   Description: Burning  Aggravating Factors: Bending, Walking and Flexing  Easing Factors: pain medication and rest    Pts goals: decrease pain and help walk better    Objective     Sensation:  Sensation is intact to light touch    ROM   Right (degrees) Left (degrees)   Lumbar Flexion  25% pain 25% pain    Lumbar Extension 25% pain 25% pain   Lumbar Sidebending 25% pain 25%   Lumbar Rotation 50% 50%       Strength   Right    Left   Gluteus Jose 4/5 4/5   Gluteus Medius 3+/5 3+/5   Hip Adductors  4-/5 4-/5   Psoas 4/5 4/5   Quadriceps 4/5 4/5   Hamstrings 4/5 4/5   Anterior Tibialis 4+/5 4+/5   Gastroc/Soleus 4+/5 4+/5   Transverse Abdominis 3+/5 3+/5     Special Tests:   Test Right Left   Lalo Test  negative negative   Neida negative negative   YONIS  negative negative   FADIR  negative negative   Scour negative negative   SLUMP positive positive     Muscle Length: decreased hamstring length bilaterally     Palpation: bilateral piriformis and and erector spinae    Gait Analysis: The patient ambulated with the use of none and presents with the following gait abnormalities: none    Other: Pt  presents with postural abnormalities which include: forward head and rounded shoulders        Function:     LOWER EXTREMITY FUNCTIONAL SCALE    Patient-reported functional assessment scores are rated as follows:  A score of 0/4 represents extreme difficulty or unable to perform the activity. A score of 1/4 represents quite a bit of difficulty. A score of 2/4 represents moderate difficulty. A score of 3/4 represents a little bit of difficulty. A score of 4/4 represents no difficulty.                EVAL   1. Any of your usual work, housework or school activities   2/4  2. Your usual hobbies, sporting     2/4  3. Getting in and out of tub      2/4  4. Walking between rooms      2/4  5. Putting on shoes or socks      1/4  6. Squatting        0/4  7. Lifting an object from the ground      1/4  8. Performing light activities around the home   2/4  9. Performing heavy activities around the home   0/4  10. Getting in and out of car      2/4  11. Walking 2 blocks       0/4  12.Walking a mile       0/4  13. Getting up and down 1 flight of stairs    0/4  14. Standing for 1 hour      0/4  15. Sitting for an hour       0/4  16. Running on even ground      0/4  17. Running on uneven ground     0/4  18. Making sharp turns when running fast    0/4  19. Hopping        0/4  20. Rolling over in bed      2/4    Patient reports 20% ability based on score of the Lower Extremity Functional Scale.       TREATMENT   Treatment Time In:  2:45  Treatment Time Out: 3:00  Total Treatment time separate from Evaluation: 15 minutes    Crow received therapeutic exercises to develop strength, endurance, ROM, flexibility, posture and core stabilization for 0 minutes including:    Crow received the following manual therapy techniques: Myofacial release and Soft tissue Mobilization were applied to the: low back for 0 minutes, including:    Crow participated in neuromuscular re-education activities to improve: Balance, Coordination, Sense,  Proprioception and Posture for 0 minutes. The following activities were included:    Crow received the following supervised modalities after being cleared for contradictions: TENS:  Crow received TENS electrical stimulation for pain to the low back. Pt received continuous mode for 15 minutes. Crow tolerated treatment well without any adverse effects.     Crow received hot pack for 15 minutes to low back.    Home Exercises and Patient Education Provided    Education provided:   - stretching    Written Home Exercises Provided: yes.  Exercises were reviewed and Crow was able to demonstrate them prior to the end of the session.  Crow demonstrated good  understanding of the education provided.     See EMR under Patient Instructions for exercises provided 9/19/2019.    Assessment   Crow is a 64 y.o. male referred to outpatient Physical Therapy with a medical diagnosis of M79.605 (ICD-10-CM) - Left leg pain. The patient presents with impairments which include decreased ROM, decreased strength, decreased muscle length, impaired balance, gait abnormalities and decreased overall function.  These impairments are limiting patient's ability to walk long distances, and squat down. Pt prognosis is Good due to personal factors and co-morbidities listed below. Pt will benefit from skilled outpatient Physical Therapy to address the deficits stated above and in the chart below, provide pt/family education, and to maximize pt's level of independence.     Plan of care discussed with patient: Yes  Pt's spiritual, cultural and educational needs considered and patient is agreeable to the plan of care and goals as stated below:     Anticipated Barriers for therapy: none    Medical Necessity is demonstrated by the following  History  Co-morbidities and personal factors that may impact the plan of care Co-morbidities:   CHF, diabetes, high BMI and HTN    Personal Factors:   no deficits     moderate   Examination  Body  Structures and Functions, activity limitations and participation restrictions that may impact the plan of care Body Regions:   back  lower extremities    Body Systems:    ROM  strength  gross coordinated movement  balance  gait  transfers    Participation Restrictions:   Community Walking    Activity limitations:   Learning and applying knowledge  no deficits    General Tasks and Commands  no deficits    Communication  no deficits    Mobility  lifting and carrying objects  walking    Self care  washing oneself (bathing, drying, washing hands)    Domestic Life  doing house work (cleaning house, washing dishes, laundry)    Interactions/Relationships  no deficits    Life Areas  no deficits    Community and Social Life  community life  recreation and leisure         moderate   Clinical Presentation evolving clinical presentation with changing clinical characteristics moderate   Decision Making/ Complexity Score: moderate     Goals:  Short Term Goals: 2 weeks   1. Recent signs and systems trend is improving in order to progress towards LTG's.  2. Patient will improve lumbar flexion AROM to 50% in order to progress towards functional activities.  3. Patient will be independent with HEP in order to further progress and return to maximal function.  4. Pain rating at Worst: 4/10 in order to progress towards increased independence with activity.  5. Improve postural awareness.     Long Term Goals: 4 weeks   1. Patient will return to normal ADL, recreational, and work related activities with less pain and limitation.   2. Patient will improve lumbar sidebend AROM to 75% in order to return to maximal functional potential.   3. Patient will improve glute med strength to 4/5 in order to improve functional independence.   4. Patient will demonstrate normal gait mechanics in order to minimize any compensation and return to PLOF.   5. Patient will self report improvement to 50% ability on the Lower Extremity Functional Scale.       Plan   Plan of care Certification: 9/19/2019 to 10/19/19.    Outpatient Physical Therapy 2 times weekly for 4 weeks to include the following interventions: Electrical Stimulation IFC, Gait Training, Manual Therapy, Moist Heat/ Ice, Neuromuscular Re-ed, Patient Education, Therapeutic Activites and Therapeutic Exercise.     Rupert Sanchez, PT, DPT

## 2019-09-19 NOTE — PROGRESS NOTES
9/19/19 - SUMMARY: Phone contact made with pt today for follow up and d/c from [facility].   INTERVENTION: Reviewed upcoming appts and he is aware of outpt therapy appt today and 3 appts tomorrow. He is unsure if he will have transportation for tomorrow and will call to reschedule appts in the morning, if he is unable to get transportation arranged. He has obtained new script for glucometer strips that was ordered after pt reported being almost out on this CM's last contact. Reviewed hypo and hyperglycemia symptoms and he reports being aware of both. Discussed CAD and CHF disease processes and symptoms of each. Encouraged to continue to follow low cholesterol, low na diet restrictions as he reports he has been trying to do. Commended him for attending appts with providers as scheduled, taking meds and following recommended diet to maintain optimal health status. Advised this CM will d/c from [facility] today related to goals met and encouraged him to follow up with the appropriate provider for any symptoms causing concern in the future. He verbalized understanding.   PLAN:D/C from [facility] today.    Todays [facility] Self-Management Care Plan was reviewed with the patients/caregivers input based on identified barriers from todays and previous assessments.  Goals were reviewed today with the patient/caregiver and the patient has agreed to continue to work towards these goals to improve his/her overall well-being. Patient verbalized understanding of the care plan, goals, and all of today's instructions. Encouraged patient/caregiver to communicate with his/her physician and health care team about health conditions and the treatment plan.  Provided my contact information today and encouraged patient/caregiver to call me with any questions as needed. Dang Clay RN

## 2019-09-30 ENCOUNTER — TELEPHONE (OUTPATIENT)
Dept: INTERNAL MEDICINE | Facility: CLINIC | Age: 64
End: 2019-09-30

## 2019-09-30 NOTE — TELEPHONE ENCOUNTER
----- Message from Mark Sims sent at 9/30/2019  4:29 PM CDT -----  Contact: pt   Pt would like cb, in reference to home health nurse orders.            [phone]

## 2019-09-30 NOTE — TELEPHONE ENCOUNTER
Patient called in regards to getting home health orders placed. Patient states that home health is usually the ones who fills all of his prescriptions for him?? Patient was advised that Dr. Gregorio was out of the office on today and would be back on tomorrow. Patient verbally understood the information given.

## 2019-10-01 NOTE — TELEPHONE ENCOUNTER
Patient is requesting order for home health. Patient has been discharge and need orders sent to [facility] for medicine management

## 2019-10-10 ENCOUNTER — CLINICAL SUPPORT (OUTPATIENT)
Dept: REHABILITATION | Facility: HOSPITAL | Age: 64
End: 2019-10-10
Payer: MEDICARE

## 2019-10-10 DIAGNOSIS — M79.605 LEFT LEG PAIN: ICD-10-CM

## 2019-10-10 PROCEDURE — 97110 THERAPEUTIC EXERCISES: CPT | Mod: HCNC

## 2019-10-10 PROCEDURE — 97150 GROUP THERAPEUTIC PROCEDURES: CPT | Mod: HCNC

## 2019-10-10 NOTE — PROGRESS NOTES
"  Physical Therapy Daily Treatment Note     Name: Crow FISCHER [facility] Number: [id number]    Therapy Diagnosis:   Encounter Diagnosis   Name Primary?    Left leg pain      Physician: Sarmad Gregorio MD    Visit Date: 10/10/2019    Physician Orders: PT Eval and Treat   Medical Diagnosis from Referral: M79.605 (ICD-10-CM) - Left leg pain  Evaluation Date: 9/19/2019  Authorization Period Expiration: 12/31/2019  Plan of Care Expiration: 10/19/19  Visit # / Visits authorized: 2/20    Precautions: Standard    Time In: 10:00am  Time Out: 11:00am  Total Billable Time: 60 minutes    SUBJECTIVE     Pt reports: that he is doing "so so" today. He is still having a lot of issues with his back. He has not been here since his eval 3 weeks ago.   He was not compliant with home exercise program.  Response to previous treatment: n/a  Functional change: n/a    TREATMENT     Crow received therapeutic exercises to develop strength, endurance, ROM, flexibility, posture and core stabilization for 60 minutes including:    Exercise 10/10/2019   Bike 10'   Ab Brace 30x5"   SKTC 10"10x   Piriformis stretch 3x30"   Hip ABD  RTB 30x3"   Hip ADD ball 30x3"   LTR 30xeach   Bridges 3x10   Ball roll outs Flex 10s 10x                     Home Exercises Provided and Patient Education Provided     Education/Self-Care provided:    Written Home Exercises Provided: Patient instructed to cont prior HEP.  Exercises were reviewed and Crow was able to demonstrate them prior to the end of the session.  Crow demonstrated poor understanding of the education provided.     See EMR under Patient Instructions for exercises provided prior visit.    ASSESSMENT     Pt tolerated all therex well with some complaints of discomfort, mostly with SKTC and piri stretches. Extended time due to multiple rest breaks needed and pt doing all activities slowly and controlled. Pt walked in with altered gait due to back/hip tightness.     Crow is progressing " well towards his goals.   Pt prognosis is Guarded.     Pt will continue to benefit from skilled outpatient physical therapy to address the deficits listed in the problem list box on initial evaluation, provide pt/family education and to maximize pt's level of independence in the home and community environment.     Pt's spiritual, cultural and educational needs considered and pt agreeable to plan of care and goals.     Anticipated barriers to physical therapy: none    Goals:   Goals:  Short Term Goals: 2 weeks   1. Recent signs and systems trend is improving in order to progress towards LTG's.  2. Patient will improve lumbar flexion AROM to 50% in order to progress towards functional activities.  3. Patient will be independent with HEP in order to further progress and return to maximal function.  4. Pain rating at Worst: 4/10 in order to progress towards increased independence with activity.  5. Improve postural awareness.      Long Term Goals: 4 weeks   1. Patient will return to normal ADL, recreational, and work related activities with less pain and limitation.   2. Patient will improve lumbar sidebend AROM to 75% in order to return to maximal functional potential.   3. Patient will improve glute med strength to 4/5 in order to improve functional independence.   4. Patient will demonstrate normal gait mechanics in order to minimize any compensation and return to PLOF.   5. Patient will self report improvement to 50% ability on the Lower Extremity Functional Scale.      PLAN   Plan of care Certification: 9/19/2019 to 10/19/19.    Continue Plan of Care (POC) and progress per patient tolerance.    Carol Lao, PTA

## 2019-10-15 ENCOUNTER — TELEPHONE (OUTPATIENT)
Dept: PHARMACY | Facility: CLINIC | Age: 64
End: 2019-10-15

## 2019-10-15 DIAGNOSIS — H40.1132 PRIMARY OPEN ANGLE GLAUCOMA (POAG) OF BOTH EYES, MODERATE STAGE: ICD-10-CM

## 2019-10-15 DIAGNOSIS — M47.26 OSTEOARTHRITIS OF SPINE WITH RADICULOPATHY, LUMBAR REGION: ICD-10-CM

## 2019-10-15 DIAGNOSIS — H40.1134 PRIMARY OPEN ANGLE GLAUCOMA OF BOTH EYES, INDETERMINATE STAGE: ICD-10-CM

## 2019-10-15 DIAGNOSIS — M51.26 HNP (HERNIATED NUCLEUS PULPOSUS), LUMBAR: ICD-10-CM

## 2019-10-15 RX ORDER — LATANOPROST 50 UG/ML
SOLUTION/ DROPS OPHTHALMIC
Qty: 1 BOTTLE | Refills: 4 | Status: SHIPPED | OUTPATIENT
Start: 2019-10-15 | End: 2020-01-07

## 2019-10-16 RX ORDER — GABAPENTIN 800 MG/1
TABLET ORAL
Qty: 270 TABLET | Refills: 0 | Status: SHIPPED | OUTPATIENT
Start: 2019-10-16 | End: 2020-01-06 | Stop reason: SDUPTHER

## 2019-10-17 DIAGNOSIS — E78.5 HYPERLIPIDEMIA ASSOCIATED WITH TYPE 2 DIABETES MELLITUS: ICD-10-CM

## 2019-10-17 DIAGNOSIS — E11.69 HYPERLIPIDEMIA ASSOCIATED WITH TYPE 2 DIABETES MELLITUS: ICD-10-CM

## 2019-10-21 RX ORDER — ATORVASTATIN CALCIUM 40 MG/1
TABLET, FILM COATED ORAL
Qty: 90 TABLET | Refills: 4 | Status: SHIPPED | OUTPATIENT
Start: 2019-10-21 | End: 2021-03-23 | Stop reason: SDUPTHER

## 2019-10-24 ENCOUNTER — PATIENT OUTREACH (OUTPATIENT)
Dept: ADMINISTRATIVE | Facility: HOSPITAL | Age: 64
End: 2019-10-24

## 2019-10-31 ENCOUNTER — HOSPITAL ENCOUNTER (OUTPATIENT)
Dept: RADIOLOGY | Facility: HOSPITAL | Age: 64
Discharge: HOME | End: 2019-10-31
Attending: FAMILY MEDICINE
Payer: MEDICARE

## 2019-10-31 ENCOUNTER — OFFICE VISIT (OUTPATIENT)
Dept: INTERNAL MEDICINE | Facility: CLINIC | Age: 64
End: 2019-10-31
Payer: MEDICARE

## 2019-10-31 VITALS
HEART RATE: 91 BPM | DIASTOLIC BLOOD PRESSURE: 90 MMHG | WEIGHT: 202.63 LBS | BODY MASS INDEX: 33.76 KG/M2 | HEIGHT: 65 IN | OXYGEN SATURATION: 98 % | SYSTOLIC BLOOD PRESSURE: 153 MMHG | TEMPERATURE: 96 F | RESPIRATION RATE: 22 BRPM

## 2019-10-31 DIAGNOSIS — J22 LRTI (LOWER RESPIRATORY TRACT INFECTION): Primary | ICD-10-CM

## 2019-10-31 DIAGNOSIS — R05.9 COUGH: ICD-10-CM

## 2019-10-31 PROCEDURE — 71046 X-RAY EXAM CHEST 2 VIEWS: CPT | Mod: 26,HCNC,, | Performed by: RADIOLOGY

## 2019-10-31 PROCEDURE — 99214 PR OFFICE/OUTPT VISIT, EST, LEVL IV, 30-39 MIN: ICD-10-PCS | Mod: HCNC,25,S$GLB, | Performed by: FAMILY MEDICINE

## 2019-10-31 PROCEDURE — 71046 XR CHEST PA AND LATERAL: ICD-10-PCS | Mod: 26,HCNC,, | Performed by: RADIOLOGY

## 2019-10-31 PROCEDURE — 3077F SYST BP >= 140 MM HG: CPT | Mod: HCNC,CPTII,S$GLB, | Performed by: FAMILY MEDICINE

## 2019-10-31 PROCEDURE — 3080F DIAST BP >= 90 MM HG: CPT | Mod: HCNC,CPTII,S$GLB, | Performed by: FAMILY MEDICINE

## 2019-10-31 PROCEDURE — 94640 PR INHAL RX, AIRWAY OBST/DX SPUTUM INDUCT: ICD-10-PCS | Mod: HCNC,S$GLB,, | Performed by: FAMILY MEDICINE

## 2019-10-31 PROCEDURE — 3008F BODY MASS INDEX DOCD: CPT | Mod: HCNC,CPTII,S$GLB, | Performed by: FAMILY MEDICINE

## 2019-10-31 PROCEDURE — 99499 UNLISTED E&M SERVICE: CPT | Mod: HCNC,S$GLB,, | Performed by: FAMILY MEDICINE

## 2019-10-31 PROCEDURE — 3080F PR MOST RECENT DIASTOLIC BLOOD PRESSURE >= 90 MM HG: ICD-10-PCS | Mod: HCNC,CPTII,S$GLB, | Performed by: FAMILY MEDICINE

## 2019-10-31 PROCEDURE — 94640 AIRWAY INHALATION TREATMENT: CPT | Mod: HCNC,S$GLB,, | Performed by: FAMILY MEDICINE

## 2019-10-31 PROCEDURE — 99999 PR PBB SHADOW E&M-EST. PATIENT-LVL III: ICD-10-PCS | Mod: PBBFAC,HCNC,, | Performed by: FAMILY MEDICINE

## 2019-10-31 PROCEDURE — 3008F PR BODY MASS INDEX (BMI) DOCUMENTED: ICD-10-PCS | Mod: HCNC,CPTII,S$GLB, | Performed by: FAMILY MEDICINE

## 2019-10-31 PROCEDURE — 99214 OFFICE O/P EST MOD 30 MIN: CPT | Mod: HCNC,25,S$GLB, | Performed by: FAMILY MEDICINE

## 2019-10-31 PROCEDURE — 3077F PR MOST RECENT SYSTOLIC BLOOD PRESSURE >= 140 MM HG: ICD-10-PCS | Mod: HCNC,CPTII,S$GLB, | Performed by: FAMILY MEDICINE

## 2019-10-31 PROCEDURE — 99499 RISK ADDL DX/OHS AUDIT: ICD-10-PCS | Mod: HCNC,S$GLB,, | Performed by: FAMILY MEDICINE

## 2019-10-31 PROCEDURE — 71046 X-RAY EXAM CHEST 2 VIEWS: CPT | Mod: TC,HCNC,PO

## 2019-10-31 PROCEDURE — 99999 PR PBB SHADOW E&M-EST. PATIENT-LVL III: CPT | Mod: PBBFAC,HCNC,, | Performed by: FAMILY MEDICINE

## 2019-10-31 RX ORDER — ALBUTEROL SULFATE 0.83 MG/ML
2.5 SOLUTION RESPIRATORY (INHALATION)
Status: COMPLETED | OUTPATIENT
Start: 2019-10-31 | End: 2019-10-31

## 2019-10-31 RX ORDER — SERTRALINE HYDROCHLORIDE 100 MG/1
100 TABLET, FILM COATED ORAL
COMMUNITY
End: 2020-04-09 | Stop reason: SDUPTHER

## 2019-10-31 RX ORDER — METOPROLOL SUCCINATE 50 MG/1
50 TABLET, EXTENDED RELEASE ORAL
COMMUNITY
Start: 2019-10-31 | End: 2019-12-18 | Stop reason: SDUPTHER

## 2019-10-31 RX ORDER — INSULIN ASPART 100 [IU]/ML
10 INJECTION, SOLUTION INTRAVENOUS; SUBCUTANEOUS
COMMUNITY
Start: 2019-10-31 | End: 2020-04-28

## 2019-10-31 RX ORDER — METOLAZONE 5 MG/1
2.5 TABLET ORAL
COMMUNITY
Start: 2019-11-01 | End: 2020-04-28 | Stop reason: ALTCHOICE

## 2019-10-31 RX ORDER — TRAZODONE HYDROCHLORIDE 100 MG/1
200 TABLET ORAL
COMMUNITY
End: 2020-04-09 | Stop reason: SDUPTHER

## 2019-10-31 RX ORDER — AMLODIPINE BESYLATE 10 MG/1
10 TABLET ORAL DAILY
COMMUNITY
Start: 2019-09-15 | End: 2020-04-09

## 2019-10-31 RX ORDER — GABAPENTIN 300 MG/1
300 CAPSULE ORAL
COMMUNITY
Start: 2019-10-31 | End: 2020-01-06

## 2019-10-31 RX ORDER — AZITHROMYCIN 250 MG/1
250 TABLET, FILM COATED ORAL
COMMUNITY
Start: 2019-10-31 | End: 2019-11-02

## 2019-10-31 RX ORDER — PREDNISONE 20 MG/1
TABLET ORAL
COMMUNITY
Start: 2019-10-31 | End: 2019-11-04

## 2019-10-31 RX ORDER — BENZONATATE 200 MG/1
200 CAPSULE ORAL 3 TIMES DAILY PRN
COMMUNITY
Start: 2019-10-31 | End: 2019-11-07

## 2019-10-31 RX ORDER — IPRATROPIUM BROMIDE 0.5 MG/2.5ML
0.5 SOLUTION RESPIRATORY (INHALATION)
Status: COMPLETED | OUTPATIENT
Start: 2019-10-31 | End: 2019-10-31

## 2019-10-31 RX ADMIN — IPRATROPIUM BROMIDE 0.5 MG: 0.5 SOLUTION RESPIRATORY (INHALATION) at 05:10

## 2019-10-31 RX ADMIN — ALBUTEROL SULFATE 2.5 MG: 0.83 SOLUTION RESPIRATORY (INHALATION) at 05:10

## 2019-10-31 NOTE — MEDICAL/APP STUDENT
Subjective:       Patient ID: Crow Green is a 64 y.o. male.    Chief Complaint: Hospital Follow Up (SOB )    Mr. Green presents to the clinic today for a hospital admission follow up. [facility] for [facility] and discharged today, 10/31, around noon. He had an episode of Afib w/RVR and was diagnosed with Parainfluenza A. He was discharged home on a steroid taper, azithromycin, and tessalon perles. Norvasc was stopped by hospital medicine. Additionally, he has home health services arranged with [facility] to begin 11/1.     He complains of a frequent cough and trouble breathing. He does not feel like he's getting better.     Review of Systems   Constitutional: Negative for chills and fever.   Respiratory: Positive for cough, shortness of breath and wheezing. Negative for stridor.    Cardiovascular: Negative for chest pain.   Gastrointestinal: Negative for nausea and vomiting.   Neurological: Negative for dizziness, light-headedness and headaches.       Objective:      Physical Exam   Constitutional: He is oriented to person, place, and time. No distress.   HENT:   Head: Normocephalic.   Eyes: Pupils are equal, round, and reactive to light.   Neck: Neck supple.   Cardiovascular: Normal rate and regular rhythm.   Pulmonary/Chest: He has wheezes. He has rales.   Rhonchi in bilateral lower lobes, expiratory wheeze bilaterally, wheezes resolved with duoneb treatment in clinic   Abdominal: He exhibits no distension.   Neurological: He is alert and oriented to person, place, and time.   Skin: Skin is warm and dry.   Psychiatric: His behavior is normal.       Assessment:       1. Cough        Plan:       Duoneb treatment in clinic  Chest xray - reviewed and compared with previous xray, stable, no evidence of lower lobe infiltrates  Nebulizer for home use with atrovent  Take meds as prescribed by hospital medicine team upon discharge

## 2019-11-01 DIAGNOSIS — E78.5 HYPERLIPIDEMIA ASSOCIATED WITH TYPE 2 DIABETES MELLITUS: ICD-10-CM

## 2019-11-01 DIAGNOSIS — E11.69 HYPERLIPIDEMIA ASSOCIATED WITH TYPE 2 DIABETES MELLITUS: ICD-10-CM

## 2019-11-01 PROBLEM — J22 LRTI (LOWER RESPIRATORY TRACT INFECTION): Status: ACTIVE | Noted: 2019-11-01

## 2019-11-01 PROBLEM — I77.9 DISORDER OF ARTERIES AND ARTERIOLES, UNSPECIFIED: Status: ACTIVE | Noted: 2019-11-01

## 2019-11-01 NOTE — PROGRESS NOTES
Subjective:       Patient ID: Crow Green is a 64 y.o. male.     Chief Complaint: Hospital Follow Up (SOB )     Mr. Green presents to the clinic today for a hospital admission follow up. He was [facility] 10[facility] for SOB and discharged today, 10/31, around noon. He had an episode of Afib w/RVR and was diagnosed with Parainfluenza A. He was discharged home on a steroid taper, azithromycin, and tessalon perles. Norvasc was stopped by hospital medicine. Additionally, he has home health services arranged with [facility] to begin 11/1.      He complains of a frequent cough and trouble breathing. He does not feel like he's getting better.      Review of Systems   Constitutional: Negative for chills and fever.   Respiratory: Positive for cough, shortness of breath and wheezing. Negative for stridor.    Cardiovascular: Negative for chest pain.   Gastrointestinal: Negative for nausea and vomiting.   Neurological: Negative for dizziness, light-headedness and headaches.       Objective:   Physical Exam   Constitutional: He is oriented to person, place, and time. No distress.   HENT:   Head: Normocephalic.   Eyes: Pupils are equal, round, and reactive to light.   Neck: Neck supple.   Cardiovascular: Normal rate and regular rhythm.   Pulmonary/Chest: He has wheezes. He has rales.   Rhonchi in bilateral lower lobes, expiratory wheeze bilaterally, wheezes resolved with duoneb treatment in clinic   Abdominal: He exhibits no distension.   Neurological: He is alert and oriented to person, place, and time.   Skin: Skin is warm and dry.   Psychiatric: His behavior is normal.       Assessment:       1. LRTI secondary to parainfluenza        Plan:       Duoneb treatment in clinic showed some improvement in sympotoms  Chest xray - reviewed and compared with previous xray, stable, no evidence of lower lobe infiltrates  Nebulizer for home use with atrovent to help with symptoms. q4 hours. Avoiding albuterol as to  hopefully avoid cardiac stimulation and risk of RVR from A. Fib.  Take meds as prescribed by hospital medicine team upon discharge.   If symptoms worsened return to ED

## 2019-11-06 ENCOUNTER — TELEPHONE (OUTPATIENT)
Dept: INTERNAL MEDICINE | Facility: CLINIC | Age: 64
End: 2019-11-06

## 2019-11-06 RX ORDER — PROMETHAZINE HYDROCHLORIDE AND DEXTROMETHORPHAN HYDROBROMIDE 6.25; 15 MG/5ML; MG/5ML
5 SYRUP ORAL
Qty: 240 ML | Refills: 0 | Status: SHIPPED | OUTPATIENT
Start: 2019-11-06 | End: 2019-11-15 | Stop reason: SDUPTHER

## 2019-11-06 RX ORDER — IPRATROPIUM BROMIDE 0.5 MG/2.5ML
500 SOLUTION RESPIRATORY (INHALATION) 4 TIMES DAILY
Qty: 1 BOX | Refills: 2 | Status: SHIPPED | OUTPATIENT
Start: 2019-11-06 | End: 2019-11-06 | Stop reason: SDUPTHER

## 2019-11-06 NOTE — TELEPHONE ENCOUNTER
----- Message from Sabina Oliveira sent at 11/6/2019  1:09 PM CST -----  Contact: self  Requesting call back due to provider ordered breathing machine but did not get medication to put in machine. Please call back at [phone].      Thanks,  Sabina Oliveira

## 2019-11-07 ENCOUNTER — OFFICE VISIT (OUTPATIENT)
Dept: INTERNAL MEDICINE | Facility: CLINIC | Age: 64
End: 2019-11-07
Payer: MEDICARE

## 2019-11-07 VITALS
SYSTOLIC BLOOD PRESSURE: 115 MMHG | HEART RATE: 88 BPM | DIASTOLIC BLOOD PRESSURE: 65 MMHG | HEIGHT: 65 IN | TEMPERATURE: 97 F | BODY MASS INDEX: 33.06 KG/M2 | OXYGEN SATURATION: 96 % | WEIGHT: 198.44 LBS

## 2019-11-07 DIAGNOSIS — I15.2 HYPERTENSION ASSOCIATED WITH DIABETES: ICD-10-CM

## 2019-11-07 DIAGNOSIS — E11.59 HYPERTENSION ASSOCIATED WITH DIABETES: ICD-10-CM

## 2019-11-07 DIAGNOSIS — J22 LOWER RESPIRATORY INFECTION: Primary | ICD-10-CM

## 2019-11-07 DIAGNOSIS — M47.26 OSTEOARTHRITIS OF SPINE WITH RADICULOPATHY, LUMBAR REGION: ICD-10-CM

## 2019-11-07 DIAGNOSIS — J22 LRTI (LOWER RESPIRATORY TRACT INFECTION): ICD-10-CM

## 2019-11-07 DIAGNOSIS — R53.81 PHYSICAL DECONDITIONING: ICD-10-CM

## 2019-11-07 PROBLEM — R06.02 SHORTNESS OF BREATH: Status: RESOLVED | Noted: 2019-11-07 | Resolved: 2019-11-07

## 2019-11-07 PROBLEM — R06.02 SHORTNESS OF BREATH: Status: ACTIVE | Noted: 2019-11-07

## 2019-11-07 PROCEDURE — 99214 OFFICE O/P EST MOD 30 MIN: CPT | Mod: HCNC,25,S$GLB, | Performed by: FAMILY MEDICINE

## 2019-11-07 PROCEDURE — 94640 PR INHAL RX, AIRWAY OBST/DX SPUTUM INDUCT: ICD-10-PCS | Mod: HCNC,S$GLB,, | Performed by: FAMILY MEDICINE

## 2019-11-07 PROCEDURE — 3074F SYST BP LT 130 MM HG: CPT | Mod: HCNC,CPTII,S$GLB, | Performed by: FAMILY MEDICINE

## 2019-11-07 PROCEDURE — 3078F DIAST BP <80 MM HG: CPT | Mod: HCNC,CPTII,S$GLB, | Performed by: FAMILY MEDICINE

## 2019-11-07 PROCEDURE — 3074F PR MOST RECENT SYSTOLIC BLOOD PRESSURE < 130 MM HG: ICD-10-PCS | Mod: HCNC,CPTII,S$GLB, | Performed by: FAMILY MEDICINE

## 2019-11-07 PROCEDURE — 99999 PR PBB SHADOW E&M-EST. PATIENT-LVL V: CPT | Mod: PBBFAC,HCNC,, | Performed by: FAMILY MEDICINE

## 2019-11-07 PROCEDURE — 99214 PR OFFICE/OUTPT VISIT, EST, LEVL IV, 30-39 MIN: ICD-10-PCS | Mod: HCNC,25,S$GLB, | Performed by: FAMILY MEDICINE

## 2019-11-07 PROCEDURE — 3046F HEMOGLOBIN A1C LEVEL >9.0%: CPT | Mod: HCNC,CPTII,S$GLB, | Performed by: FAMILY MEDICINE

## 2019-11-07 PROCEDURE — 3008F PR BODY MASS INDEX (BMI) DOCUMENTED: ICD-10-PCS | Mod: HCNC,CPTII,S$GLB, | Performed by: FAMILY MEDICINE

## 2019-11-07 PROCEDURE — 3008F BODY MASS INDEX DOCD: CPT | Mod: HCNC,CPTII,S$GLB, | Performed by: FAMILY MEDICINE

## 2019-11-07 PROCEDURE — 99999 PR PBB SHADOW E&M-EST. PATIENT-LVL V: ICD-10-PCS | Mod: PBBFAC,HCNC,, | Performed by: FAMILY MEDICINE

## 2019-11-07 PROCEDURE — 3078F PR MOST RECENT DIASTOLIC BLOOD PRESSURE < 80 MM HG: ICD-10-PCS | Mod: HCNC,CPTII,S$GLB, | Performed by: FAMILY MEDICINE

## 2019-11-07 PROCEDURE — 3046F PR MOST RECENT HEMOGLOBIN A1C LEVEL > 9.0%: ICD-10-PCS | Mod: HCNC,CPTII,S$GLB, | Performed by: FAMILY MEDICINE

## 2019-11-07 PROCEDURE — 94640 AIRWAY INHALATION TREATMENT: CPT | Mod: HCNC,S$GLB,, | Performed by: FAMILY MEDICINE

## 2019-11-07 RX ORDER — IPRATROPIUM BROMIDE 0.5 MG/2.5ML
SOLUTION RESPIRATORY (INHALATION)
Qty: 937.5 ML | Refills: 2 | Status: SHIPPED | OUTPATIENT
Start: 2019-11-07 | End: 2020-03-23 | Stop reason: SDUPTHER

## 2019-11-07 RX ORDER — ALBUTEROL SULFATE 0.83 MG/ML
2.5 SOLUTION RESPIRATORY (INHALATION)
Status: COMPLETED | OUTPATIENT
Start: 2019-11-07 | End: 2019-11-07

## 2019-11-07 RX ORDER — IPRATROPIUM BROMIDE 0.5 MG/2.5ML
0.5 SOLUTION RESPIRATORY (INHALATION) ONCE
Status: DISCONTINUED | OUTPATIENT
Start: 2019-11-07 | End: 2019-11-07

## 2019-11-07 RX ORDER — ALBUTEROL SULFATE 2.5 MG/.5ML
2.5 SOLUTION RESPIRATORY (INHALATION) ONCE
Status: DISCONTINUED | OUTPATIENT
Start: 2019-11-07 | End: 2019-11-07

## 2019-11-07 RX ADMIN — IPRATROPIUM BROMIDE 0.5 MG: 0.5 SOLUTION RESPIRATORY (INHALATION) at 11:11

## 2019-11-07 RX ADMIN — ALBUTEROL SULFATE 2.5 MG: 0.83 SOLUTION RESPIRATORY (INHALATION) at 11:11

## 2019-11-07 NOTE — PROGRESS NOTES
Patient ID: Crow Green is a 64 y.o. male.    Chief Complaint: Cough and Leg Pain (Both)    HPI 63 yo M here with complaint of continued cough. Was here a few days ago for hospital f/u. Was prescribed atrovent neb but says he did not have nebulizer solution. Will pickup from pharmacy today. Since last visit, SOB has improved. Only reports SOB with excessive coughing. Does have some antibiotics remaining. Still reports back pain that radiates to both legs. Says Gabapentin has provided relief. Pain is worse with walking and standing. Has home health visits which have been helpful.    Family History   Problem Relation Age of Onset    Glaucoma Mother     Cataracts Mother     Cataracts Father     Glaucoma Sister     Cataracts Sister     Glaucoma Maternal Aunt     No Known Problems Brother     No Known Problems Daughter     No Known Problems Son     Prostate cancer Neg Hx        Current Outpatient Medications:     ALCOHOL ANTISEPTIC PADS (ALCOHOL PREP PADS TOP), , Disp: , Rfl:     amLODIPine (NORVASC) 10 MG tablet, , Disp: , Rfl:     atorvastatin (LIPITOR) 40 MG tablet, TAKE 1 TABLET EVERY EVENING, Disp: 90 tablet, Rfl: 4    azaTHIOprine (IMURAN) 50 mg Tab, Take 1 tablet (50 mg total) by mouth 2 (two) times daily., Disp: 180 tablet, Rfl: 1    baclofen (LIORESAL) 10 MG tablet, Take 1 tablet (10 mg total) by mouth once daily., Disp: 90 tablet, Rfl: 3    benzonatate (TESSALON) 200 MG capsule, Take 200 mg by mouth 3 (three) times daily as needed., Disp: , Rfl:     blood sugar diagnostic (TRUE METRIX GLUCOSE TEST STRIP) Strp, Use with blood glucose meter kit to test blood sugar four times  daily, Disp: 200 strip, Rfl: 11    blood-glucose meter (TRUE METRIX AIR GLUCOSE METER) kit, TEST FOUR TIMES DAILY BEFORE MEALS  AND EVERY NIGHT, Disp: 1 each, Rfl: 0    diltiaZEM (CARDIZEM SR) 60 MG Cp12, Take 1 capsule (60 mg total) by mouth 2 (two) times daily., Disp: 60 capsule, Rfl: 11    ELIQUIS 5 mg Tab, ,  Disp: , Rfl:     furosemide (LASIX) 40 MG tablet, Take 2 tablets (80 mg total) by mouth every morning AND 1 tablet (40 mg total) every evening., Disp: 270 tablet, Rfl: 0    gabapentin (NEURONTIN) 300 MG capsule, Take 300 mg by mouth., Disp: , Rfl:     gabapentin (NEURONTIN) 800 MG tablet, TAKE 1 TABLET THREE TIMES DAILY, Disp: 270 tablet, Rfl: 0    glimepiride (AMARYL) 2 MG tablet, Take 1 tablet (2 mg total) by mouth before breakfast., Disp: 90 tablet, Rfl: 3    inhalation spacing device, Use as directed for inhalation., Disp: 1 Device, Rfl: 0    insulin aspart U-100 (NOVOLOG FLEXPEN U-100 INSULIN) 100 unit/mL (3 mL) InPn pen, INJECT 20 UNITS SUBCUTANEOUSLY THREE TIMES DAILY WITH MEALS, Disp: 15 mL, Rfl: 0    insulin aspart U-100 (NOVOLOG) 100 unit/mL (3 mL) InPn pen, Inject 10 Units into the skin., Disp: , Rfl:     insulin syringe-needle U-100 1 mL 31 gauge x 5/16 Syrg, , Disp: , Rfl:     isosorbide mononitrate (IMDUR) 60 MG 24 hr tablet, Take 1 tablet (60 mg total) by mouth once daily., Disp: 90 tablet, Rfl: 3    lancets 32 gauge Misc, 1 lancet by Misc.(Non-Drug; Combo Route) route 2 (two) times daily., Disp: 100 each, Rfl: 11    latanoprost 0.005 % ophthalmic solution, INSTILL 1 DROP INTO BOTH EYES EVERY EVENING, Disp: 1 Bottle, Rfl: 4    lisinopril 10 MG tablet, Take 1 tablet (10 mg total) by mouth once daily., Disp: 90 tablet, Rfl: 3    metFORMIN (GLUCOPHAGE) 1000 MG tablet, Take 1 tablet (1,000 mg total) by mouth 2 (two) times daily with meals., Disp: 180 tablet, Rfl: 3    metOLazone (ZAROXOLYN) 2.5 MG tablet, Take 1 tablet (2.5 mg total) by mouth every Mon, Wed, Fri., Disp: 36 tablet, Rfl: 3    metOLazone (ZAROXOLYN) 5 MG tablet, Take 2.5 mg by mouth., Disp: , Rfl:     metoprolol succinate (TOPROL-XL) 100 MG 24 hr tablet, Take 1 tablet (100 mg total) by mouth once daily., Disp: 30 tablet, Rfl: 1    metoprolol succinate (TOPROL-XL) 50 MG 24 hr tablet, Take 50 mg by mouth., Disp: , Rfl:      "nitroGLYCERIN (NITROSTAT) 0.3 MG SL tablet, PLACE 1 TABLET UNDER THE TONGUE EVERY 5 MINUTES AS NEEDED FOR CHEST PAIN, NO MORE THAN 3 TABLETS IN ONE DAY, Disp: 100 tablet, Rfl: 0    pantoprazole (PROTONIX) 40 MG tablet, Take 1 tablet (40 mg total) by mouth once daily., Disp: 30 tablet, Rfl: 11    pen needle, diabetic (BD ULTRA-FINE SHORT PEN NEEDLE) 31 gauge x 5/16" Ndle, Inject 1 each into the skin 3 (three) times daily., Disp: 100 each, Rfl: 11    predniSONE (DELTASONE) 10 MG tablet, Take 1.5 tablets (15 mg total) by mouth once daily., Disp: 50 tablet, Rfl: 1    semaglutide (OZEMPIC) 0.25 mg or 0.5 mg(2 mg/1.5 mL) PnIj, Inject 0.5 mg into the skin every 7 days., Disp: 1.5 mL, Rfl: 0    sertraline (ZOLOFT) 100 MG tablet, Take 100 mg by mouth., Disp: , Rfl:     sertraline (ZOLOFT) 50 MG tablet, TAKE 1 TABLET ONE TIME DAILY, Disp: 90 tablet, Rfl: 3    traZODone (DESYREL) 100 MG tablet, Take 200 mg by mouth., Disp: , Rfl:     traZODone (DESYREL) 150 MG tablet, TAKE 1 TABLET EVERY NIGHT, Disp: 90 tablet, Rfl: 1    aspirin (ECOTRIN) 81 MG EC tablet, Take 1 tablet (81 mg total) by mouth once daily., Disp: , Rfl: 0    fluticasone-salmeterol 250-50 mcg/dose (ADVAIR DISKUS) 250-50 mcg/dose diskus inhaler, Inhale 1 puff into the lungs 2 (two) times daily. Controller, Disp: 60 each, Rfl: 11    food supplemt, lactose-reduced (ENSURE) Liqd, Take 118 mLs by mouth 3 (three) times daily with meals., Disp: 1 Bottle, Rfl: 4    insulin (LANTUS SOLOSTAR U-100 INSULIN) glargine 100 units/mL (3mL) SubQ pen, Inject 80 Units into the skin every evening., Disp: 72 mL, Rfl: 0    ipratropium (ATROVENT) 0.02 % nebulizer solution, USE 1 VIAL VIA NEBULIZER FOUR TIMES DAILY, Disp: 937.5 mL, Rfl: 2    promethazine-dextromethorphan (PROMETHAZINE-DM) 6.25-15 mg/5 mL Syrp, Take 5 mLs by mouth every 4 to 6 hours as needed (cough). (Patient not taking: Reported on 11/7/2019), Disp: 240 mL, Rfl: 0    tiotropium (SPIRIVA WITH HANDIHALER) " "18 mcg inhalation capsule, Inhale 1 capsule (18 mcg total) into the lungs once daily. Controller, Disp: 90 capsule, Rfl: 0  No current facility-administered medications for this visit.     Review of Systems   Constitutional: Negative for activity change, appetite change, chills and fatigue.   HENT: Negative for congestion and rhinorrhea.    Eyes: Negative for itching.   Respiratory: Positive for cough (productive in morning) and shortness of breath (with excess coughing).    Cardiovascular: Negative for chest pain and leg swelling.   Gastrointestinal: Negative for abdominal pain.   Genitourinary: Negative for dysuria.   Musculoskeletal: Positive for arthralgias and back pain.   Neurological: Negative for dizziness and light-headedness.       Objective:   /65 (BP Location: Left arm, Patient Position: Sitting, BP Method: X-Large (Automatic))   Pulse 88   Temp 96.6 °F (35.9 °C) (Tympanic)   Ht 5' 5" (1.651 m)   Wt 90 kg (198 lb 6.6 oz)   SpO2 96%   BMI 33.02 kg/m²      Physical Exam   Constitutional: He is oriented to person, place, and time. He appears well-developed and well-nourished. No distress.   HENT:   Head: Normocephalic and atraumatic.   Eyes: Conjunctivae are normal.   Neck: Normal range of motion.   Cardiovascular: Normal rate, regular rhythm and normal heart sounds.   No murmur heard.  Pulmonary/Chest: Effort normal. No respiratory distress. He has wheezes (mild scattered expiratory wheezes).   Mildly decreased air movement   Abdominal: Soft.   Musculoskeletal: Normal range of motion. He exhibits no edema or tenderness.   No paraspinal muscle or spinal tenderness   Neurological: He is alert and oriented to person, place, and time.   Skin: Skin is warm. He is not diaphoretic.   Psychiatric: He has a normal mood and affect. His behavior is normal.       Assessment & Plan     Problem List Items Addressed This Visit        Neuro    Osteoarthritis of spine with radiculopathy, lumbar region    " Current Assessment & Plan     -no worsening of symptoms  -continue gabapentin which appears to be helping            Pulmonary    LRTI (lower respiratory tract infection)    Current Assessment & Plan     -given duoneb tx in clinic with improvement of pulmonary exam  -Neb soln sent to pharmacy  -Continue previously prescribed abx  -Overall he seems to be improving since discharge            Cardiac/Vascular    Hypertension associated with diabetes    Current Assessment & Plan     -controlled. Continue current meds            Other    Physical deconditioning    Current Assessment & Plan     -overall deconditioned 2/2 arthritis and other co-morbidities. Had PT/OT before but says it made him feel worse (only attended one session)  -requested dietary supplements be sent to his pharmacy         Relevant Medications    food supplemt, lactose-reduced (ENSURE) Liqd    RESOLVED: Shortness of breath - Primary    Relevant Medications    albuterol nebulizer solution 2.5 mg (Completed)            Follow up if symptoms worsen or fail to improve.

## 2019-11-07 NOTE — ASSESSMENT & PLAN NOTE
-overall deconditioned 2/2 arthritis and other co-morbidities. Had PT/OT before but says it made him feel worse (only attended one session)  -requested dietary supplements be sent to his pharmacy

## 2019-11-07 NOTE — ASSESSMENT & PLAN NOTE
-given duoneb tx in clinic with improvement of pulmonary exam  -Neb soln sent to pharmacy  -Continue previously prescribed abx  -Overall he seems to be improving since discharge

## 2019-11-11 ENCOUNTER — TELEPHONE (OUTPATIENT)
Dept: INTERNAL MEDICINE | Facility: CLINIC | Age: 64
End: 2019-11-11

## 2019-11-11 NOTE — TELEPHONE ENCOUNTER
----- Message from Anna Bashir sent at 11/11/2019 10:52 AM CST -----  Contact: pt  Call pt in regards to getting a hose called in for his Nebulizer.   .[phone] (home)

## 2019-11-15 ENCOUNTER — TELEPHONE (OUTPATIENT)
Dept: INTERNAL MEDICINE | Facility: CLINIC | Age: 64
End: 2019-11-15

## 2019-11-15 RX ORDER — PROMETHAZINE HYDROCHLORIDE AND DEXTROMETHORPHAN HYDROBROMIDE 6.25; 15 MG/5ML; MG/5ML
5 SYRUP ORAL
Qty: 240 ML | Refills: 0 | Status: SHIPPED | OUTPATIENT
Start: 2019-11-15 | End: 2019-11-19 | Stop reason: SDUPTHER

## 2019-11-15 NOTE — TELEPHONE ENCOUNTER
----- Message from Rosaura Martinez sent at 11/15/2019 10:01 AM CST -----  Contact: SHAGGY JASON [[id number]]  [phone]    Patient needs a refill of his cough syrup Promethazine DM.    [facility]

## 2019-11-15 NOTE — TELEPHONE ENCOUNTER
----- Message from Saira Tyson sent at 11/15/2019  4:11 PM CST -----  ..Type:  RX Refill Request    Who Called: patient  Refill or New Rx:refill  RX Name and Strength:cough medication  How is the patient currently taking it? (ex. 1XDay)  Is this a 30 day or 90 day RX:  Preferred Pharmacy with phone number:.  [id number] - [facility] [place] - [address] AT [place] [facility]  [address] [place] [address]  Phone: [phone] Fax: [phone]        Local or Mail Order:local  Ordering Provider:  Would the patient rather a call back or a response via [facility]?   Best Call Back Number:.[phone] (home) [phone] (work)    Additional Information:

## 2019-11-15 NOTE — TELEPHONE ENCOUNTER
----- Message from Rosaura Martinez sent at 11/15/2019 10:01 AM CST -----  Contact: SHAGGY JASON [[id number]]  [phone]    Patient needs a refill of his cough syrup Promethazine DM.    [facility]

## 2019-11-18 NOTE — TELEPHONE ENCOUNTER
Clinical follow-up conducted for Yunior. Name/ confirmed. no missed doses; no new medications; no side effects noted. He mentioned that his appetite has gotten better. He has not lost any pounds and has in fact gained a few pounds. He confirmed taking it twice a day and states that he has been using gloves to take the medication. Patient understands to report any medication changes to [facility] and provider. All questions answered and addressed to patients satisfaction.      Monica Bolanos, Pharm.D.  Pharmacy Resident, PGY-1   [facility]

## 2019-11-19 ENCOUNTER — TELEPHONE (OUTPATIENT)
Dept: INTERNAL MEDICINE | Facility: CLINIC | Age: 64
End: 2019-11-19

## 2019-11-19 DIAGNOSIS — E11.42 DIABETIC POLYNEUROPATHY ASSOCIATED WITH TYPE 2 DIABETES MELLITUS: Primary | ICD-10-CM

## 2019-11-19 NOTE — TELEPHONE ENCOUNTER
----- Message from Saira Tyson sent at 11/19/2019  9:53 AM CST -----  Patient needs call back with status of paperwork..[phone] (home) [phone] (work)

## 2019-11-19 NOTE — TELEPHONE ENCOUNTER
Pt requesting order for diabetic shoes. Wants to go to  in [place].   Also wants to get a smaller bottle of cough medicine bc the bigger bottle is too expensive

## 2019-11-19 NOTE — TELEPHONE ENCOUNTER
----- Message from Susana Castelan sent at 11/19/2019  2:01 PM CST -----  Contact: Pt   Pt is calling regarding requesting to have nurse call to back. Pt  states that call is concerning getting script for diabetics shoes also getting script broken down into bottoms on cough syrup. .[phone]       .Thank You  Susana Castelan

## 2019-11-20 ENCOUNTER — OFFICE VISIT (OUTPATIENT)
Dept: CARDIOLOGY | Facility: CLINIC | Age: 64
End: 2019-11-20
Payer: MEDICARE

## 2019-11-20 VITALS
SYSTOLIC BLOOD PRESSURE: 114 MMHG | WEIGHT: 202.19 LBS | BODY MASS INDEX: 33.69 KG/M2 | HEART RATE: 81 BPM | DIASTOLIC BLOOD PRESSURE: 72 MMHG | OXYGEN SATURATION: 91 % | HEIGHT: 65 IN

## 2019-11-20 DIAGNOSIS — I48.0 PAF (PAROXYSMAL ATRIAL FIBRILLATION): ICD-10-CM

## 2019-11-20 DIAGNOSIS — I48.91 ATRIAL FIBRILLATION WITH RVR: Primary | ICD-10-CM

## 2019-11-20 DIAGNOSIS — I50.22 CHRONIC SYSTOLIC CONGESTIVE HEART FAILURE: ICD-10-CM

## 2019-11-20 PROCEDURE — 3008F PR BODY MASS INDEX (BMI) DOCUMENTED: ICD-10-PCS | Mod: HCNC,CPTII,S$GLB, | Performed by: INTERNAL MEDICINE

## 2019-11-20 PROCEDURE — 99499 UNLISTED E&M SERVICE: CPT | Mod: HCNC,S$GLB,, | Performed by: INTERNAL MEDICINE

## 2019-11-20 PROCEDURE — 99999 PR PBB SHADOW E&M-EST. PATIENT-LVL III: CPT | Mod: PBBFAC,HCNC,, | Performed by: INTERNAL MEDICINE

## 2019-11-20 PROCEDURE — 99215 PR OFFICE/OUTPT VISIT, EST, LEVL V, 40-54 MIN: ICD-10-PCS | Mod: HCNC,S$GLB,, | Performed by: INTERNAL MEDICINE

## 2019-11-20 PROCEDURE — 99215 OFFICE O/P EST HI 40 MIN: CPT | Mod: HCNC,S$GLB,, | Performed by: INTERNAL MEDICINE

## 2019-11-20 PROCEDURE — 3008F BODY MASS INDEX DOCD: CPT | Mod: HCNC,CPTII,S$GLB, | Performed by: INTERNAL MEDICINE

## 2019-11-20 PROCEDURE — 3078F DIAST BP <80 MM HG: CPT | Mod: HCNC,CPTII,S$GLB, | Performed by: INTERNAL MEDICINE

## 2019-11-20 PROCEDURE — 3074F PR MOST RECENT SYSTOLIC BLOOD PRESSURE < 130 MM HG: ICD-10-PCS | Mod: HCNC,CPTII,S$GLB, | Performed by: INTERNAL MEDICINE

## 2019-11-20 PROCEDURE — 3074F SYST BP LT 130 MM HG: CPT | Mod: HCNC,CPTII,S$GLB, | Performed by: INTERNAL MEDICINE

## 2019-11-20 PROCEDURE — 99999 PR PBB SHADOW E&M-EST. PATIENT-LVL III: ICD-10-PCS | Mod: PBBFAC,HCNC,, | Performed by: INTERNAL MEDICINE

## 2019-11-20 PROCEDURE — 3078F PR MOST RECENT DIASTOLIC BLOOD PRESSURE < 80 MM HG: ICD-10-PCS | Mod: HCNC,CPTII,S$GLB, | Performed by: INTERNAL MEDICINE

## 2019-11-20 PROCEDURE — 99499 RISK ADDL DX/OHS AUDIT: ICD-10-PCS | Mod: HCNC,S$GLB,, | Performed by: INTERNAL MEDICINE

## 2019-11-20 RX ORDER — PROMETHAZINE HYDROCHLORIDE AND DEXTROMETHORPHAN HYDROBROMIDE 6.25; 15 MG/5ML; MG/5ML
5 SYRUP ORAL
Qty: 240 ML | Refills: 0 | Status: SHIPPED | OUTPATIENT
Start: 2019-11-20 | End: 2019-11-30

## 2019-11-20 NOTE — PROGRESS NOTES
Subjective:    Patient ID:  Crow Green is a 64 y.o. male who presents for follow-up of PAF (paroxysmal atrial fibrillation) and Loss of Consciousness (fell and blacked out 1 week ago)      64 yoM here for AF management.     He was undergoing a PFT and developed AF/RVR 8/7/19. He was very symptomatic in AF. He had hypotension leading to admission. He converted to NSR by 8/8/19. His EF was 40-45% (baseline NICM). He was started on eliquis. No known prior similar symptoms. He has CHADSVASC of 3.     Interval history: He presented with AF/RVR and was diagnosed with Influenza. No therapy for AF was provided. He feels his AF episodes are getting worse. Pulse regular today. He had an episode of syncope while getting up to use the bathroom early morning. No prior or subsequent events. He remains on apixaban for CVA prophylaxis.     NM 7/17:  Nuclear Quantitative Functional Analysis:   LVEF: 38 %  LVED Volume: 127 ml  LVES Volume: 79 ml    Impression: NORMAL MYOCARDIAL PERFUSION  1. The perfusion scan is free of evidence for myocardial ischemia or injury.   2. Resting wall motion is physiologic.   3. There is resting LV dysfunction with a reduced ejection fraction of 38 %.   4. The ventricular volumes are normal at rest and stress.   5. The extracardiac distribution of radioactivity is normal.     Echo 8/19:  CONCLUSIONS     1 - Mildly depressed left ventricular systolic function (EF 45-50%).     2 - Impaired LV relaxation, normal LAP (grade 1 diastolic dysfunction).     3 - Normal right ventricular systolic function .     4 - Moderate left atrial enlargement.     5 - Concentric hypertrophy.     Past Medical History:  No date: Acute kidney injury  No date: Arthritis  No date: Asthma  No date: Atrial fibrillation  No date: CHF (congestive heart failure)  No date: Depression  Diagnosed in 2000: Diabetes mellitus, type 2  No date: Elevated PSA  No date: Hypertension  No date: Sleep apnea    Past Surgical History:  No  date: CHOLECYSTECTOMY    Social History    Socioeconomic History      Marital status: Legally       Spouse name: Not on file      Number of children: 5      Years of education: Not on file      Highest education level: Not on file    Occupational History      Occupation: disabled    Social Needs      Financial resource strain: Not on file      Food insecurity:        Worry: Not on file        Inability: Not on file      Transportation needs:        Medical: Not on file        Non-medical: Not on file    Tobacco Use      Smoking status: Never Smoker      Smokeless tobacco: Never Used    Substance and Sexual Activity      Alcohol use: No      Drug use: No      Sexual activity: Yes        Partners: Female    Lifestyle      Physical activity:        Days per week: Not on file        Minutes per session: Not on file      Stress: Rather much    Relationships      Social connections:        Talks on phone: Not on file        Gets together: Not on file        Attends Bahai service: Not on file        Active member of club or organization: Not on file        Attends meetings of clubs or organizations: Not on file        Relationship status: Not on file    Other Topics      Concerns:        Not on file    Social History Narrative      Not on file      Review of patient's family history indicates:  Problem: Glaucoma      Relation: Mother          Age of Onset: (Not Specified)  Problem: Cataracts      Relation: Mother          Age of Onset: (Not Specified)  Problem: Cataracts      Relation: Father          Age of Onset: (Not Specified)  Problem: Glaucoma      Relation: Sister          Age of Onset: (Not Specified)  Problem: Cataracts      Relation: Sister          Age of Onset: (Not Specified)  Problem: Glaucoma      Relation: Maternal Aunt          Age of Onset: (Not Specified)  Problem: No Known Problems      Relation: Brother          Age of Onset: (Not Specified)  Problem: No Known Problems      Relation:  Daughter          Age of Onset: (Not Specified)  Problem: No Known Problems      Relation: Son          Age of Onset: (Not Specified)  Problem: Prostate cancer      Relation: Neg Hx          Age of Onset: (Not Specified)        Review of Systems   Constitution: Negative.   HENT: Negative.    Eyes: Negative.    Cardiovascular: Positive for irregular heartbeat and palpitations. Negative for chest pain, dyspnea on exertion, leg swelling, near-syncope and syncope.   Respiratory: Negative.  Negative for shortness of breath.    Endocrine: Negative.    Hematologic/Lymphatic: Negative.    Skin: Negative.    Musculoskeletal: Negative.    Gastrointestinal: Negative.    Genitourinary: Negative.    Neurological: Negative.  Negative for dizziness and light-headedness.   Psychiatric/Behavioral: Negative.    Allergic/Immunologic: Negative.         Objective:    Physical Exam   Constitutional: He is oriented to person, place, and time. He appears well-developed and well-nourished. No distress.   HENT:   Head: Normocephalic and atraumatic.   Eyes: Pupils are equal, round, and reactive to light. EOM are normal.   Neck: Normal range of motion. No JVD present. No thyromegaly present.   Cardiovascular: Normal rate, regular rhythm, S1 normal, S2 normal and normal heart sounds. PMI is not displaced. Exam reveals no gallop and no friction rub.   No murmur heard.  Pulmonary/Chest: Effort normal and breath sounds normal. No respiratory distress. He has no wheezes. He has no rales.   Abdominal: Soft. Bowel sounds are normal. He exhibits no distension. There is no tenderness. There is no rebound and no guarding.   Musculoskeletal: Normal range of motion. He exhibits no edema or tenderness.   Neurological: He is alert and oriented to person, place, and time. No cranial nerve deficit.   Skin: Skin is warm and dry. No rash noted. No erythema.   Psychiatric: He has a normal mood and affect. His behavior is normal. Judgment and thought content  normal.   Vitals reviewed.        Assessment:       1. Atrial fibrillation with RVR    2. Chronic systolic congestive heart failure    3. PAF (paroxysmal atrial fibrillation)         Plan:       64 yoM paroxysmal AF here for management. He has recurrent, symptomatic AF/RVR. He is not a good candidate for AAD therapy. I offered PVI for definitive therapy. I had extensive discussion with patient regarding risks and benefits of PVI/WACA, and he would like to proceed. Risks of procedure include (but are not limited to) bleeding, stroke, perforation requiring emergency draining or surgery, AV block, death, AV fistula, AE fistula, PV stenosis.    Continue anti-coagulation until day prior to procedure.    RF PVI  PITO  Anesthesia  DAMIEN prior, cancel if NSR    Possible candidate for Tactiflex study

## 2019-11-22 ENCOUNTER — TELEPHONE (OUTPATIENT)
Dept: INTERNAL MEDICINE | Facility: CLINIC | Age: 64
End: 2019-11-22

## 2019-11-22 NOTE — TELEPHONE ENCOUNTER
Patient call with concern that  is no longer accepting [employer]. Patient would like to have referral send somewhere else. Order was fax to [facility]. Patient has been notified and they might call for add information . Patient express understanding

## 2019-11-22 NOTE — TELEPHONE ENCOUNTER
----- Message from Sabina Oliveira sent at 11/22/2019  1:57 PM CST -----  Contact: self  requesting call back regarding informing provider that foor provider pt was referred to does not accept [employer] and is requesting some where else to be seen at. Please call back at [phone].      Thanks,  Sabina Oliveira

## 2019-11-25 ENCOUNTER — TELEPHONE (OUTPATIENT)
Dept: SPORTS MEDICINE | Facility: CLINIC | Age: 64
End: 2019-11-25

## 2019-11-25 DIAGNOSIS — E11.3299 TYPE 2 DIABETES MELLITUS WITH MILD NONPROLIFERATIVE RETINOPATHY, WITH LONG-TERM CURRENT USE OF INSULIN, MACULAR EDEMA PRESENCE UNSPECIFIED, UNSPECIFIED LATERALITY: ICD-10-CM

## 2019-11-25 DIAGNOSIS — M25.512 LEFT SHOULDER PAIN, UNSPECIFIED CHRONICITY: Primary | ICD-10-CM

## 2019-11-25 DIAGNOSIS — Z79.4 TYPE 2 DIABETES MELLITUS WITH MILD NONPROLIFERATIVE RETINOPATHY, WITH LONG-TERM CURRENT USE OF INSULIN, MACULAR EDEMA PRESENCE UNSPECIFIED, UNSPECIFIED LATERALITY: ICD-10-CM

## 2019-11-26 ENCOUNTER — TELEPHONE (OUTPATIENT)
Dept: SPORTS MEDICINE | Facility: CLINIC | Age: 64
End: 2019-11-26

## 2019-11-26 RX ORDER — METFORMIN HYDROCHLORIDE 1000 MG/1
1000 TABLET ORAL 2 TIMES DAILY WITH MEALS
Qty: 180 TABLET | Refills: 0 | Status: SHIPPED | OUTPATIENT
Start: 2019-11-26 | End: 2020-05-19

## 2019-11-26 NOTE — TELEPHONE ENCOUNTER
Called pt in regards to rescheduling ortho apt because the provider will be out of the office. Offered apt on 11/26 and pt was unable to schedule due to transportation. Apt was scheduled for 12/2.

## 2019-12-02 ENCOUNTER — HOSPITAL ENCOUNTER (OUTPATIENT)
Dept: RADIOLOGY | Facility: HOSPITAL | Age: 64
Discharge: HOME | End: 2019-12-02
Attending: FAMILY MEDICINE
Payer: MEDICARE

## 2019-12-02 ENCOUNTER — OFFICE VISIT (OUTPATIENT)
Dept: SPORTS MEDICINE | Facility: CLINIC | Age: 64
End: 2019-12-02
Payer: MEDICARE

## 2019-12-02 VITALS
HEART RATE: 93 BPM | WEIGHT: 202.19 LBS | DIASTOLIC BLOOD PRESSURE: 89 MMHG | BODY MASS INDEX: 33.69 KG/M2 | HEIGHT: 65 IN | SYSTOLIC BLOOD PRESSURE: 140 MMHG

## 2019-12-02 DIAGNOSIS — M25.512 LEFT SHOULDER PAIN, UNSPECIFIED CHRONICITY: Primary | ICD-10-CM

## 2019-12-02 DIAGNOSIS — S46.012A TRAUMATIC TEAR OF LEFT ROTATOR CUFF, UNSPECIFIED TEAR EXTENT, INITIAL ENCOUNTER: ICD-10-CM

## 2019-12-02 DIAGNOSIS — M25.512 LEFT SHOULDER PAIN, UNSPECIFIED CHRONICITY: ICD-10-CM

## 2019-12-02 DIAGNOSIS — M25.512 ACUTE PAIN OF LEFT SHOULDER: Primary | ICD-10-CM

## 2019-12-02 PROCEDURE — 73030 X-RAY EXAM OF SHOULDER: CPT | Mod: TC,HCNC,LT

## 2019-12-02 PROCEDURE — 3008F PR BODY MASS INDEX (BMI) DOCUMENTED: ICD-10-PCS | Mod: HCNC,CPTII,S$GLB, | Performed by: ORTHOPAEDIC SURGERY

## 2019-12-02 PROCEDURE — 3079F PR MOST RECENT DIASTOLIC BLOOD PRESSURE 80-89 MM HG: ICD-10-PCS | Mod: HCNC,CPTII,S$GLB, | Performed by: ORTHOPAEDIC SURGERY

## 2019-12-02 PROCEDURE — 99214 PR OFFICE/OUTPT VISIT, EST, LEVL IV, 30-39 MIN: ICD-10-PCS | Mod: HCNC,25,S$GLB, | Performed by: ORTHOPAEDIC SURGERY

## 2019-12-02 PROCEDURE — 3008F BODY MASS INDEX DOCD: CPT | Mod: HCNC,CPTII,S$GLB, | Performed by: ORTHOPAEDIC SURGERY

## 2019-12-02 PROCEDURE — 3077F PR MOST RECENT SYSTOLIC BLOOD PRESSURE >= 140 MM HG: ICD-10-PCS | Mod: HCNC,CPTII,S$GLB, | Performed by: ORTHOPAEDIC SURGERY

## 2019-12-02 PROCEDURE — 3077F SYST BP >= 140 MM HG: CPT | Mod: HCNC,CPTII,S$GLB, | Performed by: ORTHOPAEDIC SURGERY

## 2019-12-02 PROCEDURE — 3079F DIAST BP 80-89 MM HG: CPT | Mod: HCNC,CPTII,S$GLB, | Performed by: ORTHOPAEDIC SURGERY

## 2019-12-02 PROCEDURE — 73030 XR SHOULDER COMPLETE 2 OR MORE VIEWS LEFT: ICD-10-PCS | Mod: 26,HCNC,LT, | Performed by: RADIOLOGY

## 2019-12-02 PROCEDURE — 73030 X-RAY EXAM OF SHOULDER: CPT | Mod: 26,HCNC,LT, | Performed by: RADIOLOGY

## 2019-12-02 PROCEDURE — 99999 PR PBB SHADOW E&M-EST. PATIENT-LVL III: CPT | Mod: PBBFAC,HCNC,, | Performed by: ORTHOPAEDIC SURGERY

## 2019-12-02 PROCEDURE — 99999 PR PBB SHADOW E&M-EST. PATIENT-LVL III: ICD-10-PCS | Mod: PBBFAC,HCNC,, | Performed by: ORTHOPAEDIC SURGERY

## 2019-12-02 PROCEDURE — 99214 OFFICE O/P EST MOD 30 MIN: CPT | Mod: HCNC,25,S$GLB, | Performed by: ORTHOPAEDIC SURGERY

## 2019-12-02 RX ORDER — AMOXICILLIN 500 MG/1
CAPSULE ORAL
Refills: 0 | COMMUNITY
Start: 2019-11-21 | End: 2020-02-21

## 2019-12-02 RX ORDER — PEAK FLOW METER
EACH MISCELLANEOUS
Refills: 0 | COMMUNITY
Start: 2019-11-04

## 2019-12-02 NOTE — PROGRESS NOTES
Subjective:     Patient ID: Crow Green is a 64 y.o. male.    Chief Complaint: Pain of the Left Shoulder    Crow Green, a 64 y.o. RHD male, presents today for evaluation of his LEFT shoulder. About  2 weeks ago he was getting out of his bed when he fell, hitting his left side. Denies numbness and radiating pain. Reports anterior shoulder burning and tingling. Pain with ADLs that require lifting and raising arm above 90. No prior trauma to left upper extremity. No history of cervical spine problems.     Shoulder Pain    The pain is present in the left shoulder. This is a new problem. The current episode started 1 to 4 weeks ago. There has been a history of trauma. The injury was the result of a falling action while at home. The problem occurs constantly. The problem has been gradually worsening. The quality of the pain is described as tingling, burning, aching and sharp. The pain is at a severity of 6/10. Associated symptoms include a limited range of motion. Pertinent negatives include no fever or numbness. The symptoms are aggravated by activity, bearing weight, flexion and extension. He has tried nothing for the symptoms. Physical therapy was not tried.      Past Medical History:   Diagnosis Date    Acute kidney injury     Arthritis     Asthma     Atrial fibrillation     CHF (congestive heart failure)     Depression     Diabetes mellitus, type 2 Diagnosed in 2000    Elevated PSA     Hypertension     Sleep apnea      Past Surgical History:   Procedure Laterality Date    CHOLECYSTECTOMY       Family History   Problem Relation Age of Onset    Glaucoma Mother     Cataracts Mother     Cataracts Father     Glaucoma Sister     Cataracts Sister     Glaucoma Maternal Aunt     No Known Problems Brother     No Known Problems Daughter     No Known Problems Son     Prostate cancer Neg Hx      Social History     Socioeconomic History    Marital status: Legally      Spouse name:  Not on file    Number of children: 5    Years of education: Not on file    Highest education level: Not on file   Occupational History    Occupation: disabled   Social Needs    Financial resource strain: Not on file    Food insecurity:     Worry: Not on file     Inability: Not on file    Transportation needs:     Medical: Not on file     Non-medical: Not on file   Tobacco Use    Smoking status: Never Smoker    Smokeless tobacco: Never Used   Substance and Sexual Activity    Alcohol use: No    Drug use: No    Sexual activity: Yes     Partners: Female   Lifestyle    Physical activity:     Days per week: Not on file     Minutes per session: Not on file    Stress: Rather much   Relationships    Social connections:     Talks on phone: Not on file     Gets together: Not on file     Attends Jainism service: Not on file     Active member of club or organization: Not on file     Attends meetings of clubs or organizations: Not on file     Relationship status: Not on file   Other Topics Concern    Not on file   Social History Narrative    Not on file     Medication List with Changes/Refills   Current Medications    ALCOHOL ANTISEPTIC PADS (ALCOHOL PREP PADS TOP)        AMLODIPINE (NORVASC) 10 MG TABLET    Take 10 mg by mouth once daily.     AMOXICILLIN (AMOXIL) 500 MG CAPSULE        ASPIRIN (ECOTRIN) 81 MG EC TABLET    Take 1 tablet (81 mg total) by mouth once daily.    ATORVASTATIN (LIPITOR) 40 MG TABLET    TAKE 1 TABLET EVERY EVENING    AZATHIOPRINE (IMURAN) 50 MG TAB    Take 1 tablet (50 mg total) by mouth 2 (two) times daily.    BACLOFEN (LIORESAL) 10 MG TABLET    Take 1 tablet (10 mg total) by mouth once daily.    BLOOD SUGAR DIAGNOSTIC (TRUE METRIX GLUCOSE TEST STRIP) STRP    Use with blood glucose meter kit to test blood sugar four times  daily    BLOOD-GLUCOSE METER (TRUE METRIX AIR GLUCOSE METER) KIT    TEST FOUR TIMES DAILY BEFORE MEALS  AND EVERY NIGHT    DILTIAZEM (CARDIZEM SR) 60 MG CP12    Take  1 capsule (60 mg total) by mouth 2 (two) times daily.    ELIQUIS 5 MG TAB    Take 5 mg by mouth 2 (two) times daily.     FLUTICASONE-SALMETEROL 250-50 MCG/DOSE (ADVAIR DISKUS) 250-50 MCG/DOSE DISKUS INHALER    Inhale 1 puff into the lungs 2 (two) times daily. Controller    FOOD SUPPLEMT, LACTOSE-REDUCED (ENSURE) LIQD    Take 118 mLs by mouth 3 (three) times daily with meals.    FUROSEMIDE (LASIX) 40 MG TABLET    Take 2 tablets (80 mg total) by mouth every morning AND 1 tablet (40 mg total) every evening.    GABAPENTIN (NEURONTIN) 300 MG CAPSULE    Take 300 mg by mouth.    GABAPENTIN (NEURONTIN) 800 MG TABLET    TAKE 1 TABLET THREE TIMES DAILY    GLIMEPIRIDE (AMARYL) 2 MG TABLET    Take 1 tablet (2 mg total) by mouth before breakfast.    INHALATION SPACING DEVICE    Use as directed for inhalation.    INSULIN (LANTUS SOLOSTAR U-100 INSULIN) GLARGINE 100 UNITS/ML (3ML) SUBQ PEN    Inject 80 Units into the skin every evening.    INSULIN ASPART U-100 (NOVOLOG FLEXPEN U-100 INSULIN) 100 UNIT/ML (3 ML) INPN PEN    INJECT 20 UNITS SUBCUTANEOUSLY THREE TIMES DAILY WITH MEALS    INSULIN ASPART U-100 (NOVOLOG) 100 UNIT/ML (3 ML) INPN PEN    Inject 10 Units into the skin.    INSULIN SYRINGE-NEEDLE U-100 1 ML 31 GAUGE X 5/16 SYRG        IPRATROPIUM (ATROVENT) 0.02 % NEBULIZER SOLUTION    USE 1 VIAL VIA NEBULIZER FOUR TIMES DAILY    ISOSORBIDE MONONITRATE (IMDUR) 60 MG 24 HR TABLET    Take 1 tablet (60 mg total) by mouth once daily.    LANCETS 32 GAUGE MISC    1 lancet by Misc.(Non-Drug; Combo Route) route 2 (two) times daily.    LATANOPROST 0.005 % OPHTHALMIC SOLUTION    INSTILL 1 DROP INTO BOTH EYES EVERY EVENING    LISINOPRIL 10 MG TABLET    Take 1 tablet (10 mg total) by mouth once daily.    METFORMIN (GLUCOPHAGE) 1000 MG TABLET    TAKE 1 TABLET (1,000 MG TOTAL) BY MOUTH 2 (TWO) TIMES DAILY WITH MEALS.    METOLAZONE (ZAROXOLYN) 2.5 MG TABLET    Take 1 tablet (2.5 mg total) by mouth every Mon, Wed, Fri.    METOLAZONE  "(ZAROXOLYN) 5 MG TABLET    Take 2.5 mg by mouth.    METOPROLOL SUCCINATE (TOPROL-XL) 100 MG 24 HR TABLET    Take 1 tablet (100 mg total) by mouth once daily.    METOPROLOL SUCCINATE (TOPROL-XL) 50 MG 24 HR TABLET    Take 50 mg by mouth.    NITROGLYCERIN (NITROSTAT) 0.3 MG SL TABLET    PLACE 1 TABLET UNDER THE TONGUE EVERY 5 MINUTES AS NEEDED FOR CHEST PAIN, NO MORE THAN 3 TABLETS IN ONE DAY    PANTOPRAZOLE (PROTONIX) 40 MG TABLET    Take 1 tablet (40 mg total) by mouth once daily.    PEN NEEDLE, DIABETIC (BD ULTRA-FINE SHORT PEN NEEDLE) 31 GAUGE X 5/16" NDLE    Inject 1 each into the skin 3 (three) times daily.    PREDNISONE (DELTASONE) 10 MG TABLET    Take 1.5 tablets (15 mg total) by mouth once daily.    SEMAGLUTIDE (OZEMPIC) 0.25 MG OR 0.5 MG(2 MG/1.5 ML) PNIJ    Inject 0.5 mg into the skin every 7 days.    SERTRALINE (ZOLOFT) 100 MG TABLET    Take 100 mg by mouth.    SERTRALINE (ZOLOFT) 50 MG TABLET    TAKE 1 TABLET ONE TIME DAILY    TIOTROPIUM (SPIRIVA WITH HANDIHALER) 18 MCG INHALATION CAPSULE    Inhale 1 capsule (18 mcg total) into the lungs once daily. Controller    TRAZODONE (DESYREL) 100 MG TABLET    Take 200 mg by mouth.    TRAZODONE (DESYREL) 150 MG TABLET    TAKE 1 TABLET EVERY NIGHT    VIOS AEROSOL DELIVERY SYSTEM VIN    USE AS DIRESTED     Review of patient's allergies indicates:  No Known Allergies  Review of Systems   Constitution: Negative for chills and fever.   HENT: Negative for ear discharge and hearing loss.    Eyes: Negative for blurred vision and visual disturbance.   Cardiovascular: Negative for chest pain and leg swelling.   Respiratory: Negative for cough and shortness of breath.    Endocrine: Negative for polyuria.   Hematologic/Lymphatic: Negative for bleeding problem.   Skin: Negative for rash.   Musculoskeletal: Positive for joint pain. Negative for back pain, joint swelling, muscle cramps and muscle weakness.   Gastrointestinal: Negative for nausea and vomiting.   Genitourinary: " Negative for hematuria.   Neurological: Negative for loss of balance, numbness and paresthesias.   Psychiatric/Behavioral: Negative for altered mental status.       Objective:   Body mass index is 33.64 kg/m².  Vitals:    12/02/19 1031   BP: (!) 140/89   Pulse: 93                General    Vitals reviewed.  Constitutional: He is oriented to person, place, and time. He appears well-developed and well-nourished. No distress.   HENT:   Head: Atraumatic.   Nose: Nose normal.   Eyes: EOM are normal. Right eye exhibits no discharge. Left eye exhibits no discharge.   Neck: Neck supple.   Cardiovascular: Normal rate and intact distal pulses.    Pulmonary/Chest: Effort normal. No respiratory distress.   Neurological: He is alert and oriented to person, place, and time. Coordination normal.   Psychiatric: He has a normal mood and affect. His behavior is normal. Judgment and thought content normal.         Right Shoulder Exam     Inspection/Observation   Swelling: absent  Bruising: absent  Scars: absent  Deformity: absent  Scapular Winging: absent  Scapular Dyskinesia: negative  Atrophy: absent    Tenderness   The patient is experiencing no tenderness.    Range of Motion   Active abduction: 90   Passive abduction: 100   Extension: 0   Forward Flexion: 180   Forward Elevation: 180Adduction: 40   External Rotation 0 degrees: 50   Internal rotation 0 degrees: T8     Tests & Signs   Drop arm: negative  Arevalo test: negative  Impingement: negative  Lift Off Sign: negative  Active Compression Test ([place]'s Sign): negative  Speed's Test: negative    Other   Sensation: normal    Left Shoulder Exam     Inspection/Observation   Swelling: absent  Bruising: absent  Scars: absent  Deformity: absent  Scapular Winging: absent  Scapular Dyskinesia: negative  Atrophy: absent    Tenderness   The patient is tender to palpation of the acromioclavicular joint, biceps tendon and supraspinatus.    Range of Motion   Active abduction: 90   Passive  abduction: 100   Extension: 0   Forward Flexion: 90   Forward Elevation: 170Adduction: 40   External Rotation 0 degrees: 50   Internal rotation 0 degrees: Sacrum     Tests & Signs   Drop arm: negative  Arevalo test: positive  Impingement: positive  Rotator Cuff Painful Arc/Range: severe  Lift Off Sign: positive  Active Compression test ([place]'s Sign): negative  Speed's Test: positive  Bear Hug: negative    Other   Sensation: normal       Muscle Strength   Right Upper Extremity   Shoulder Abduction: 5/5   Shoulder Internal Rotation: 5/5   Shoulder External Rotation: 5/5   Supraspinatus: 5/5/5   Subscapularis: 5/5/5   Biceps: 5/5/5   Left Upper Extremity  Shoulder Abduction: 5/5   Shoulder Internal Rotation: 4/5   Shoulder External Rotation: 4/5   Supraspinatus: 3/5/5   Subscapularis: 4/5/5   Biceps: 5/5/5     Reflexes     Left Side  Biceps:  2+  Triceps:  2+    Right Side   Biceps:  2+  Triceps:  2+    Vascular Exam     Right Pulses      Radial:                    2+      Left Pulses      Radial:                    2+      Capillary Refill  Right Hand: normal capillary refill  Left Hand: normal capillary refill      Relevant imaging results reviewed and interpreted by me, discussed with the patient and / or family today     Reading Physician Reading Date Result Priority   Jame Stephens MD 12/2/2019 Routine      Narrative     EXAMINATION:  XR SHOULDER COMPLETE 2 OR MORE VIEWS LEFT    CLINICAL HISTORY:  Pain in left shoulder    TECHNIQUE:  Two or three views of the left shoulder were performed.    COMPARISON:  None    FINDINGS:  There is no radiographic evidence of acute osseous, articular, or soft tissue abnormality.  There are mild degenerative changes present at the AC and glenohumeral joints.      Impression       As above.  No acute findings.      Electronically signed by: Jame Stephens MD  Date: 12/02/2019  Time: 10:22          Assessment:     Encounter Diagnoses   Name Primary?    Acute pain of left  shoulder Yes    Traumatic tear of left rotator cuff, unspecified tear extent, initial encounter         Plan:     MRI left shoulder  F/up after mri                  Disclaimer: This note was prepared using a voice recognition system and is likely to have sound alike errors within the text.

## 2019-12-03 PROCEDURE — G0179 PR HOME HEALTH MD RECERTIFICATION: ICD-10-PCS | Mod: ,,, | Performed by: FAMILY MEDICINE

## 2019-12-03 PROCEDURE — G0179 MD RECERTIFICATION HHA PT: HCPCS | Mod: ,,, | Performed by: FAMILY MEDICINE

## 2019-12-05 ENCOUNTER — TELEPHONE (OUTPATIENT)
Dept: ORTHOPEDICS | Facility: CLINIC | Age: 64
End: 2019-12-05

## 2019-12-05 DIAGNOSIS — Z01.812 PRE-PROCEDURE LAB EXAM: Primary | ICD-10-CM

## 2019-12-05 NOTE — TELEPHONE ENCOUNTER
Attempted to contact pt regarding STAT Lab prior to MRI in [facility]. No answer. Left voicemail with details to arrive by 8am for blood work prior, AL, LPN.

## 2019-12-06 ENCOUNTER — TELEPHONE (OUTPATIENT)
Dept: ORTHOPEDICS | Facility: CLINIC | Age: 64
End: 2019-12-06

## 2019-12-06 NOTE — TELEPHONE ENCOUNTER
Rescheduled pt's MRI and Lab. Pt was instructed to arrive by 4:30pm for the STAT Lab prior to the MRI. He verbalized understanding. Also rescheduled the MRI review with Dr. Zavaleta. Pt confirmed date, time, location of all appointments and requested appointment notifications be mailed to him. Confirmed mailing address and placed in  for delivery. Pt was thankful for the call, SILVIA FRANK.     ----- Message from Cheryl Hull sent at 12/6/2019  8:33 AM CST -----  Contact: pt  Pleas call the pt to reschedule today's appt, I wasn't able to reschedule, the pt can be reached at [phone]///[id number]

## 2019-12-09 ENCOUNTER — TELEPHONE (OUTPATIENT)
Dept: INTERNAL MEDICINE | Facility: CLINIC | Age: 64
End: 2019-12-09

## 2019-12-09 ENCOUNTER — OFFICE VISIT (OUTPATIENT)
Dept: PODIATRY | Facility: CLINIC | Age: 64
End: 2019-12-09
Payer: MEDICARE

## 2019-12-09 ENCOUNTER — TELEPHONE (OUTPATIENT)
Dept: CARDIOLOGY | Facility: HOSPITAL | Age: 64
End: 2019-12-09

## 2019-12-09 VITALS
WEIGHT: 201.5 LBS | SYSTOLIC BLOOD PRESSURE: 132 MMHG | HEART RATE: 89 BPM | HEIGHT: 65 IN | DIASTOLIC BLOOD PRESSURE: 78 MMHG | BODY MASS INDEX: 33.57 KG/M2

## 2019-12-09 DIAGNOSIS — E11.49 TYPE II DIABETES MELLITUS WITH NEUROLOGICAL MANIFESTATIONS: Primary | ICD-10-CM

## 2019-12-09 DIAGNOSIS — B35.1 DERMATOPHYTOSIS OF NAIL: ICD-10-CM

## 2019-12-09 PROCEDURE — 99499 UNLISTED E&M SERVICE: CPT | Mod: HCNC,S$GLB,, | Performed by: PODIATRIST

## 2019-12-09 PROCEDURE — 99999 PR PBB SHADOW E&M-EST. PATIENT-LVL III: ICD-10-PCS | Mod: PBBFAC,HCNC,, | Performed by: PODIATRIST

## 2019-12-09 PROCEDURE — 99499 NO LOS: ICD-10-PCS | Mod: HCNC,S$GLB,, | Performed by: PODIATRIST

## 2019-12-09 PROCEDURE — 99999 PR PBB SHADOW E&M-EST. PATIENT-LVL III: CPT | Mod: PBBFAC,HCNC,, | Performed by: PODIATRIST

## 2019-12-09 PROCEDURE — 11721 DEBRIDE NAIL 6 OR MORE: CPT | Mod: Q9,HCNC,S$GLB, | Performed by: PODIATRIST

## 2019-12-09 PROCEDURE — 11721 PR DEBRIDEMENT OF NAILS, 6 OR MORE: ICD-10-PCS | Mod: Q9,HCNC,S$GLB, | Performed by: PODIATRIST

## 2019-12-09 NOTE — PROGRESS NOTES
Subjective:     Patient ID: Crow Green is a 64 y.o. male.    Chief Complaint: Nail Care (no c/o pain, wear tennis shoes with socks, diabetic pt, PCP Dr. Lambert last seen 10/31/19)    Crow is a 64 y.o. male who presents to the clinic for evaluation and treatment of high risk feet. Crow has a past medical history of Acute kidney injury, Arthritis, Asthma, Atrial fibrillation, CHF (congestive heart failure), Depression, Diabetes mellitus, type 2 (Diagnosed in 2000), Elevated PSA, Hypertension, and Sleep apnea. The patient's chief complaint is long, thick toenails.  This patient has documented high risk feet requiring routine maintenance secondary to diabetes mellitis and those secondary complications of diabetes, as mentioned..    PCP: Mateo aLmbert, DO    Date Last Seen by PCP: 10/31/19    Current shoe gear:  Affected Foot: Tennis shoes     Unaffected Foot: Tennis shoes    Hemoglobin A1C   Date Value Ref Range Status   08/08/2019 9.7 (H) 4.0 - 5.6 % Final     Comment:     ADA Screening Guidelines:  5.7-6.4%  Consistent with prediabetes  >or=6.5%  Consistent with diabetes  High levels of fetal hemoglobin interfere with the HbA1C  assay. Heterozygous hemoglobin variants (HbS, HgC, etc)do  not significantly interfere with this assay.   However, presence of multiple variants may affect accuracy.     06/27/2019 9.8 (H) 4.0 - 5.6 % Final     Comment:     ADA Screening Guidelines:  5.7-6.4%  Consistent with prediabetes  >or=6.5%  Consistent with diabetes  High levels of fetal hemoglobin interfere with the HbA1C  assay. Heterozygous hemoglobin variants (HbS, HgC, etc)do  not significantly interfere with this assay.   However, presence of multiple variants may affect accuracy.     03/20/2019 10.8 (H) 4.0 - 5.6 % Final     Comment:     ADA Screening Guidelines:  5.7-6.4%  Consistent with prediabetes  >or=6.5%  Consistent with diabetes  High levels of fetal hemoglobin interfere with the HbA1C  assay.  Heterozygous hemoglobin variants (HbS, HgC, etc)do  not significantly interfere with this assay.   However, presence of multiple variants may affect accuracy.             Patient Active Problem List   Diagnosis    ED (erectile dysfunction)    Testicular hypogonadism    Non-proliferative diabetic retinopathy, mild, both eyes    Hypertension associated with diabetes    Diabetic polyneuropathy    Coronary artery disease of native artery of native heart with stable angina pectoris    Chronic systolic congestive heart failure    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, with long-term current use of insulin    SANDRITA on CPAP    Moderate asthma    Psychophysiological insomnia    Nightmares    Sleep related gastroesophageal reflux disease    Inadequate sleep hygiene    PAF (paroxysmal atrial fibrillation)    At risk for medication noncompliance    Obesity (BMI 30.0-34.9)    Indeterminate stage chronic open angle glaucoma    Elevated troponin    Right hip pain    Gait instability    Chronic right shoulder pain    Arthritis    Left sided sciatica    Dyspnea on exertion    Osteoarthritis of spine with radiculopathy, lumbar region    HNP (herniated nucleus pulposus), lumbar    Gastroesophageal reflux disease without esophagitis    Chronic diastolic heart failure    Hypogonadism in male    Disorder of parathyroid gland    Hyperlipidemia associated with type 2 diabetes mellitus    Atrial fibrillation with RVR    Left leg pain    Disorder of arteries and arterioles, unspecified    LRTI (lower respiratory tract infection)    Physical deconditioning    Acute pain of left shoulder    Traumatic tear of left rotator cuff       Medication List with Changes/Refills   Current Medications    ALCOHOL ANTISEPTIC PADS (ALCOHOL PREP PADS TOP)        AMLODIPINE (NORVASC) 10 MG TABLET    Take 10 mg by mouth once daily.     AMOXICILLIN (AMOXIL) 500 MG  CAPSULE        ASPIRIN (ECOTRIN) 81 MG EC TABLET    Take 1 tablet (81 mg total) by mouth once daily.    ATORVASTATIN (LIPITOR) 40 MG TABLET    TAKE 1 TABLET EVERY EVENING    AZATHIOPRINE (IMURAN) 50 MG TAB    Take 1 tablet (50 mg total) by mouth 2 (two) times daily.    BACLOFEN (LIORESAL) 10 MG TABLET    Take 1 tablet (10 mg total) by mouth once daily.    BLOOD SUGAR DIAGNOSTIC (TRUE METRIX GLUCOSE TEST STRIP) STRP    Use with blood glucose meter kit to test blood sugar four times  daily    BLOOD-GLUCOSE METER (TRUE METRIX AIR GLUCOSE METER) KIT    TEST FOUR TIMES DAILY BEFORE MEALS  AND EVERY NIGHT    DILTIAZEM (CARDIZEM SR) 60 MG CP12    Take 1 capsule (60 mg total) by mouth 2 (two) times daily.    ELIQUIS 5 MG TAB    Take 5 mg by mouth 2 (two) times daily.     FLUTICASONE-SALMETEROL 250-50 MCG/DOSE (ADVAIR DISKUS) 250-50 MCG/DOSE DISKUS INHALER    Inhale 1 puff into the lungs 2 (two) times daily. Controller    FOOD SUPPLEMT, LACTOSE-REDUCED (ENSURE) LIQD    Take 118 mLs by mouth 3 (three) times daily with meals.    FUROSEMIDE (LASIX) 40 MG TABLET    Take 2 tablets (80 mg total) by mouth every morning AND 1 tablet (40 mg total) every evening.    GABAPENTIN (NEURONTIN) 300 MG CAPSULE    Take 300 mg by mouth.    GABAPENTIN (NEURONTIN) 800 MG TABLET    TAKE 1 TABLET THREE TIMES DAILY    GLIMEPIRIDE (AMARYL) 2 MG TABLET    Take 1 tablet (2 mg total) by mouth before breakfast.    INHALATION SPACING DEVICE    Use as directed for inhalation.    INSULIN (LANTUS SOLOSTAR U-100 INSULIN) GLARGINE 100 UNITS/ML (3ML) SUBQ PEN    Inject 80 Units into the skin every evening.    INSULIN ASPART U-100 (NOVOLOG FLEXPEN U-100 INSULIN) 100 UNIT/ML (3 ML) INPN PEN    INJECT 20 UNITS SUBCUTANEOUSLY THREE TIMES DAILY WITH MEALS    INSULIN ASPART U-100 (NOVOLOG) 100 UNIT/ML (3 ML) INPN PEN    Inject 10 Units into the skin.    INSULIN SYRINGE-NEEDLE U-100 1 ML 31 GAUGE X 5/16 SYRG        IPRATROPIUM (ATROVENT) 0.02 % NEBULIZER SOLUTION     "USE 1 VIAL VIA NEBULIZER FOUR TIMES DAILY    ISOSORBIDE MONONITRATE (IMDUR) 60 MG 24 HR TABLET    Take 1 tablet (60 mg total) by mouth once daily.    LANCETS 32 GAUGE MISC    1 lancet by Misc.(Non-Drug; Combo Route) route 2 (two) times daily.    LATANOPROST 0.005 % OPHTHALMIC SOLUTION    INSTILL 1 DROP INTO BOTH EYES EVERY EVENING    LISINOPRIL 10 MG TABLET    Take 1 tablet (10 mg total) by mouth once daily.    METFORMIN (GLUCOPHAGE) 1000 MG TABLET    TAKE 1 TABLET (1,000 MG TOTAL) BY MOUTH 2 (TWO) TIMES DAILY WITH MEALS.    METOLAZONE (ZAROXOLYN) 2.5 MG TABLET    Take 1 tablet (2.5 mg total) by mouth every Mon, Wed, Fri.    METOLAZONE (ZAROXOLYN) 5 MG TABLET    Take 2.5 mg by mouth.    METOPROLOL SUCCINATE (TOPROL-XL) 100 MG 24 HR TABLET    Take 1 tablet (100 mg total) by mouth once daily.    METOPROLOL SUCCINATE (TOPROL-XL) 50 MG 24 HR TABLET    Take 50 mg by mouth.    NITROGLYCERIN (NITROSTAT) 0.3 MG SL TABLET    PLACE 1 TABLET UNDER THE TONGUE EVERY 5 MINUTES AS NEEDED FOR CHEST PAIN, NO MORE THAN 3 TABLETS IN ONE DAY    PANTOPRAZOLE (PROTONIX) 40 MG TABLET    Take 1 tablet (40 mg total) by mouth once daily.    PEN NEEDLE, DIABETIC (BD ULTRA-FINE SHORT PEN NEEDLE) 31 GAUGE X 5/16" NDLE    Inject 1 each into the skin 3 (three) times daily.    PREDNISONE (DELTASONE) 10 MG TABLET    Take 1.5 tablets (15 mg total) by mouth once daily.    SEMAGLUTIDE (OZEMPIC) 0.25 MG OR 0.5 MG(2 MG/1.5 ML) PNIJ    Inject 0.5 mg into the skin every 7 days.    SERTRALINE (ZOLOFT) 100 MG TABLET    Take 100 mg by mouth.    SERTRALINE (ZOLOFT) 50 MG TABLET    TAKE 1 TABLET ONE TIME DAILY    TIOTROPIUM (SPIRIVA WITH HANDIHALER) 18 MCG INHALATION CAPSULE    Inhale 1 capsule (18 mcg total) into the lungs once daily. Controller    TRAZODONE (DESYREL) 100 MG TABLET    Take 200 mg by mouth.    TRAZODONE (DESYREL) 150 MG TABLET    TAKE 1 TABLET EVERY NIGHT    VIOS AEROSOL DELIVERY SYSTEM VIN    USE AS DIRESTED       Review of patient's " "allergies indicates:  No Known Allergies    Past Surgical History:   Procedure Laterality Date    CHOLECYSTECTOMY         Family History   Problem Relation Age of Onset    Glaucoma Mother     Cataracts Mother     Cataracts Father     Glaucoma Sister     Cataracts Sister     Glaucoma Maternal Aunt     No Known Problems Brother     No Known Problems Daughter     No Known Problems Son     Prostate cancer Neg Hx        Social History     Socioeconomic History    Marital status: Legally      Spouse name: Not on file    Number of children: 5    Years of education: Not on file    Highest education level: Not on file   Occupational History    Occupation: disabled   Social Needs    Financial resource strain: Not on file    Food insecurity:     Worry: Not on file     Inability: Not on file    Transportation needs:     Medical: Not on file     Non-medical: Not on file   Tobacco Use    Smoking status: Never Smoker    Smokeless tobacco: Never Used   Substance and Sexual Activity    Alcohol use: No    Drug use: No    Sexual activity: Yes     Partners: Female   Lifestyle    Physical activity:     Days per week: Not on file     Minutes per session: Not on file    Stress: Rather much   Relationships    Social connections:     Talks on phone: Not on file     Gets together: Not on file     Attends Mu-ism service: Not on file     Active member of club or organization: Not on file     Attends meetings of clubs or organizations: Not on file     Relationship status: Not on file   Other Topics Concern    Not on file   Social History Narrative    Not on file       Vitals:    12/09/19 0955   BP: 132/78   Pulse: 89   Weight: 91.4 kg (201 lb 8 oz)   Height: 5' 5" (1.651 m)       Hemoglobin A1C   Date Value Ref Range Status   08/08/2019 9.7 (H) 4.0 - 5.6 % Final     Comment:     ADA Screening Guidelines:  5.7-6.4%  Consistent with prediabetes  >or=6.5%  Consistent with diabetes  High levels of fetal " hemoglobin interfere with the HbA1C  assay. Heterozygous hemoglobin variants (HbS, HgC, etc)do  not significantly interfere with this assay.   However, presence of multiple variants may affect accuracy.     06/27/2019 9.8 (H) 4.0 - 5.6 % Final     Comment:     ADA Screening Guidelines:  5.7-6.4%  Consistent with prediabetes  >or=6.5%  Consistent with diabetes  High levels of fetal hemoglobin interfere with the HbA1C  assay. Heterozygous hemoglobin variants (HbS, HgC, etc)do  not significantly interfere with this assay.   However, presence of multiple variants may affect accuracy.     03/20/2019 10.8 (H) 4.0 - 5.6 % Final     Comment:     ADA Screening Guidelines:  5.7-6.4%  Consistent with prediabetes  >or=6.5%  Consistent with diabetes  High levels of fetal hemoglobin interfere with the HbA1C  assay. Heterozygous hemoglobin variants (HbS, HgC, etc)do  not significantly interfere with this assay.   However, presence of multiple variants may affect accuracy.         Review of Systems   Constitutional: Negative for chills and fever.   Respiratory: Negative for shortness of breath.    Cardiovascular: Negative for chest pain, palpitations, orthopnea, claudication and leg swelling.   Gastrointestinal: Negative for diarrhea, nausea and vomiting.   Musculoskeletal: Negative for joint pain.   Skin: Negative for rash.   Neurological: Positive for tingling and sensory change. Negative for dizziness, focal weakness and weakness.   Psychiatric/Behavioral: Negative.          Objective:      PHYSICAL EXAM: Apperance: Alert and orient in no distress,well developed, and with good attention to grooming and body habits  Patient presents ambulating in tennis shoes.   LOWER EXTREMITY EXAM:  VASCULAR: Dorsalis pedis pulses 2/4 bilateral and Posterior Tibial pulses 2/4 bilateral. Capillary fill time <3 seconds bilateral. No edema observed bilateral. Varicosities absent bilateral. Skin temperature of the lower extremities is warm to warm,  proximal to distal. Hair growth dim bilateral.  DERMATOLOGICAL: No skin rashes, subcutaneous nodules, lesions, or ulcers observed bilateral. Nails 1,2,3,4,5 bilateral elongated, thickened, and discolored with subungual debris. Webspaces 1,2,3,4 clean, dry and without evidence of break in skin integrity bilateral. Dry and scaly skin noted plantarly bilateral.   NEUROLOGICAL: Light touch, sharp-dull, proprioception all present and equal bilaterally.  Vibratory sensation diminished at bilateral hallux and navicular. Protective sensation absent at sites as tested with a [employer]-Kimmie 5.07 monofilament.   MUSCULOSKELETAL: Muscle strength is 5/5 for foot inverters, everters, plantarflexors, and dorsiflexors. Muscle tone is normal.         Assessment:       Encounter Diagnoses   Name Primary?    Type II diabetes mellitus with neurological manifestations Yes    Dermatophytosis of nail          Plan:   Type II diabetes mellitus with neurological manifestations    Dermatophytosis of nail      I counseled the patient on his conditions, regarding findings of my examination, my impressions, and usual treatment plan.   Greater than 15 minutes of a 20 minute appointment spent on education about the diabetic foot, neuropathy, and prevention of limb loss.  Shoe inspection. Diabetic Foot Education. Patient reminded of the importance of good nutrition and blood sugar control to help prevent podiatric complications of diabetes. Patient instructed on proper foot hygeine. We discussed wearing proper shoe gear, daily foot inspections, never walking without protective shoe gear, never putting sharp instruments to feet.    With patient's permission, nails 1-5 bilateral were debrided/trimmed in length and thickness aggressively to their soft tissue attachment mechanically and with electric , removing all offending nail and debris. Patient relates relief following the procedure.  Patient  will continue to monitor the areas daily,  inspect feet, wear protective shoe gear when ambulatory, moisturizer to maintain skin integrity. Patient reminded of the importance of good nutrition and blood sugar control to help prevent podiatric complications of diabetes.  Patient to return 3 months or sooner if needed.              Barb Veras DPM  [facility]

## 2019-12-09 NOTE — TELEPHONE ENCOUNTER
----- Message from Saira Tyson sent at 12/9/2019  9:50 AM CST -----  Patient needs call back rg diabetic shoes...[phone] (home) [phone] (work)

## 2019-12-11 ENCOUNTER — TELEPHONE (OUTPATIENT)
Dept: PHARMACY | Facility: CLINIC | Age: 64
End: 2019-12-11

## 2019-12-11 NOTE — TELEPHONE ENCOUNTER
Patient called for refill readiness on [employer]uran. No answer - lvm for call back.     Monica Bolanos, Pharm.D.  Pharmacy Resident, PGY-1   [facility]

## 2019-12-12 ENCOUNTER — TELEPHONE (OUTPATIENT)
Dept: RADIOLOGY | Facility: HOSPITAL | Age: 64
End: 2019-12-12

## 2019-12-13 ENCOUNTER — HOSPITAL ENCOUNTER (OUTPATIENT)
Dept: RADIOLOGY | Facility: HOSPITAL | Age: 64
Discharge: HOME | End: 2019-12-13
Attending: ORTHOPAEDIC SURGERY
Payer: MEDICARE

## 2019-12-13 DIAGNOSIS — M25.512 LEFT SHOULDER PAIN, UNSPECIFIED CHRONICITY: ICD-10-CM

## 2019-12-13 PROCEDURE — 73221 MRI JOINT UPR EXTREM W/O DYE: CPT | Mod: 26,HCNC,LT, | Performed by: RADIOLOGY

## 2019-12-13 PROCEDURE — 73221 MRI SHOULDER WITHOUT CONTRAST LEFT: ICD-10-PCS | Mod: 26,HCNC,LT, | Performed by: RADIOLOGY

## 2019-12-13 PROCEDURE — 73221 MRI JOINT UPR EXTREM W/O DYE: CPT | Mod: TC,HCNC,LT

## 2019-12-16 ENCOUNTER — TELEPHONE (OUTPATIENT)
Dept: INTERNAL MEDICINE | Facility: CLINIC | Age: 64
End: 2019-12-16

## 2019-12-16 NOTE — TELEPHONE ENCOUNTER
----- Message from Jean Christie sent at 12/16/2019  3:40 PM CST -----  Contact: self [phone]  Pt would like to follow-up with nurse regarding diabetic shoes.  Please call back at [phone].  Md Jocelyn

## 2019-12-17 ENCOUNTER — TELEPHONE (OUTPATIENT)
Dept: ELECTROPHYSIOLOGY | Facility: CLINIC | Age: 64
End: 2019-12-17

## 2019-12-17 NOTE — TELEPHONE ENCOUNTER
Attempted to contact patient to schedule procedure. No answer, unable to leave message, voicemail full.

## 2019-12-18 RX ORDER — METOPROLOL SUCCINATE 50 MG/1
50 TABLET, EXTENDED RELEASE ORAL DAILY
Qty: 90 TABLET | Refills: 3 | Status: SHIPPED | OUTPATIENT
Start: 2019-12-18 | End: 2020-12-15 | Stop reason: SDUPTHER

## 2019-12-20 ENCOUNTER — TELEPHONE (OUTPATIENT)
Dept: PHARMACY | Facility: CLINIC | Age: 64
End: 2019-12-20

## 2019-12-20 ENCOUNTER — OFFICE VISIT (OUTPATIENT)
Dept: SPORTS MEDICINE | Facility: CLINIC | Age: 64
End: 2019-12-20
Payer: MEDICARE

## 2019-12-20 ENCOUNTER — TELEPHONE (OUTPATIENT)
Dept: SPORTS MEDICINE | Facility: CLINIC | Age: 64
End: 2019-12-20

## 2019-12-20 ENCOUNTER — TELEPHONE (OUTPATIENT)
Dept: RHEUMATOLOGY | Facility: CLINIC | Age: 64
End: 2019-12-20

## 2019-12-20 VITALS
BODY MASS INDEX: 33.49 KG/M2 | HEIGHT: 65 IN | DIASTOLIC BLOOD PRESSURE: 83 MMHG | HEART RATE: 81 BPM | SYSTOLIC BLOOD PRESSURE: 135 MMHG | WEIGHT: 201 LBS

## 2019-12-20 DIAGNOSIS — M54.16 LUMBAR RADICULOPATHY: ICD-10-CM

## 2019-12-20 DIAGNOSIS — S46.012A TRAUMATIC TEAR OF LEFT ROTATOR CUFF, UNSPECIFIED TEAR EXTENT, INITIAL ENCOUNTER: ICD-10-CM

## 2019-12-20 DIAGNOSIS — R52 PAIN: ICD-10-CM

## 2019-12-20 DIAGNOSIS — M25.512 ACUTE PAIN OF LEFT SHOULDER: Primary | ICD-10-CM

## 2019-12-20 DIAGNOSIS — M54.12 CERVICAL RADICULOPATHY: ICD-10-CM

## 2019-12-20 DIAGNOSIS — M25.512 LEFT SHOULDER PAIN, UNSPECIFIED CHRONICITY: ICD-10-CM

## 2019-12-20 PROCEDURE — 3079F DIAST BP 80-89 MM HG: CPT | Mod: HCNC,CPTII,S$GLB, | Performed by: ORTHOPAEDIC SURGERY

## 2019-12-20 PROCEDURE — 3075F SYST BP GE 130 - 139MM HG: CPT | Mod: HCNC,CPTII,S$GLB, | Performed by: ORTHOPAEDIC SURGERY

## 2019-12-20 PROCEDURE — 99214 OFFICE O/P EST MOD 30 MIN: CPT | Mod: 25,HCNC,S$GLB, | Performed by: ORTHOPAEDIC SURGERY

## 2019-12-20 PROCEDURE — 99214 PR OFFICE/OUTPT VISIT, EST, LEVL IV, 30-39 MIN: ICD-10-PCS | Mod: 25,HCNC,S$GLB, | Performed by: ORTHOPAEDIC SURGERY

## 2019-12-20 PROCEDURE — 3008F PR BODY MASS INDEX (BMI) DOCUMENTED: ICD-10-PCS | Mod: HCNC,CPTII,S$GLB, | Performed by: ORTHOPAEDIC SURGERY

## 2019-12-20 PROCEDURE — 3079F PR MOST RECENT DIASTOLIC BLOOD PRESSURE 80-89 MM HG: ICD-10-PCS | Mod: HCNC,CPTII,S$GLB, | Performed by: ORTHOPAEDIC SURGERY

## 2019-12-20 PROCEDURE — 20610 PR DRAIN/INJECT LARGE JOINT/BURSA: ICD-10-PCS | Mod: HCNC,LT,S$GLB, | Performed by: ORTHOPAEDIC SURGERY

## 2019-12-20 PROCEDURE — 20610 DRAIN/INJ JOINT/BURSA W/O US: CPT | Mod: HCNC,LT,S$GLB, | Performed by: ORTHOPAEDIC SURGERY

## 2019-12-20 PROCEDURE — 3075F PR MOST RECENT SYSTOLIC BLOOD PRESS GE 130-139MM HG: ICD-10-PCS | Mod: HCNC,CPTII,S$GLB, | Performed by: ORTHOPAEDIC SURGERY

## 2019-12-20 PROCEDURE — 3008F BODY MASS INDEX DOCD: CPT | Mod: HCNC,CPTII,S$GLB, | Performed by: ORTHOPAEDIC SURGERY

## 2019-12-20 PROCEDURE — 99999 PR PBB SHADOW E&M-EST. PATIENT-LVL IV: ICD-10-PCS | Mod: PBBFAC,HCNC,, | Performed by: ORTHOPAEDIC SURGERY

## 2019-12-20 PROCEDURE — 99999 PR PBB SHADOW E&M-EST. PATIENT-LVL IV: CPT | Mod: PBBFAC,HCNC,, | Performed by: ORTHOPAEDIC SURGERY

## 2019-12-20 RX ORDER — TRIAMCINOLONE ACETONIDE 40 MG/ML
40 INJECTION, SUSPENSION INTRA-ARTICULAR; INTRAMUSCULAR
Status: COMPLETED | OUTPATIENT
Start: 2019-12-20 | End: 2019-12-20

## 2019-12-20 RX ADMIN — TRIAMCINOLONE ACETONIDE 40 MG: 40 INJECTION, SUSPENSION INTRA-ARTICULAR; INTRAMUSCULAR at 10:12

## 2019-12-20 NOTE — TELEPHONE ENCOUNTER
----- Message from Georgette Xiao PharmD sent at 12/20/2019 12:45 PM CST -----  Regarding: Yunior  Good Afternoon,  Mr. Green missed his last follow up appt with Dr. Lucero and would like to reschedule. Could your office reach out to him to schedule a follow up appt?       Thanks,  Georgette Xiao, PharmD, CSP [facility]  [phone]

## 2019-12-20 NOTE — PROGRESS NOTES
Subjective:     Patient ID: Crow Green is a 64 y.o. male.    Chief Complaint: Pain of the Left Shoulder    12/20/2019  Crow Green, a 64 y.o. RHD male, returns today for evaluation of his LEFT shoulder and MRI results. He reports worsening pain and stiffness. He uses a rolator scooter and reports pain when using.    12/02/19  Crow Green, a 64 y.o. RHD male, presents today for evaluation of his LEFT shoulder. About  2 weeks ago he was getting out of his bed when he fell, hitting his left side. Denies numbness and radiating pain. Reports anterior shoulder burning and tingling. Pain with ADLs that require lifting and raising arm above 90. No prior trauma to left upper extremity. No history of cervical spine problems.     Shoulder Pain    The pain is present in the left shoulder. This is a chronic problem. The current episode started 1 to 4 weeks ago and more than 1 month ago. There has been a history of trauma. The injury was the result of a falling action while at home. The problem occurs constantly. The problem has been gradually worsening. The quality of the pain is described as tingling, burning, aching and sharp. The pain is at a severity of 10/10. Associated symptoms include a limited range of motion and stiffness. The symptoms are aggravated by activity, bearing weight, flexion and extension. He has tried nothing for the symptoms. Physical therapy was not tried.      Past Medical History:   Diagnosis Date    Acute kidney injury     Arthritis     Asthma     Atrial fibrillation     CHF (congestive heart failure)     Depression     Diabetes mellitus, type 2 Diagnosed in 2000    Elevated PSA     Hypertension     Sleep apnea      Past Surgical History:   Procedure Laterality Date    CHOLECYSTECTOMY       Family History   Problem Relation Age of Onset    Glaucoma Mother     Cataracts Mother     Cataracts Father     Glaucoma Sister     Cataracts Sister     Glaucoma Maternal  Aunt     No Known Problems Brother     No Known Problems Daughter     No Known Problems Son     Prostate cancer Neg Hx      Social History     Socioeconomic History    Marital status: Legally      Spouse name: Not on file    Number of children: 5    Years of education: Not on file    Highest education level: Not on file   Occupational History    Occupation: disabled   Social Needs    Financial resource strain: Not on file    Food insecurity:     Worry: Not on file     Inability: Not on file    Transportation needs:     Medical: Not on file     Non-medical: Not on file   Tobacco Use    Smoking status: Never Smoker    Smokeless tobacco: Never Used   Substance and Sexual Activity    Alcohol use: No    Drug use: No    Sexual activity: Yes     Partners: Female   Lifestyle    Physical activity:     Days per week: Not on file     Minutes per session: Not on file    Stress: Rather much   Relationships    Social connections:     Talks on phone: Not on file     Gets together: Not on file     Attends Holiness service: Not on file     Active member of club or organization: Not on file     Attends meetings of clubs or organizations: Not on file     Relationship status: Not on file   Other Topics Concern    Not on file   Social History Narrative    Not on file     Medication List with Changes/Refills   Current Medications    ALCOHOL ANTISEPTIC PADS (ALCOHOL PREP PADS TOP)        AMLODIPINE (NORVASC) 10 MG TABLET    Take 10 mg by mouth once daily.     AMOXICILLIN (AMOXIL) 500 MG CAPSULE        ASPIRIN (ECOTRIN) 81 MG EC TABLET    Take 1 tablet (81 mg total) by mouth once daily.    ATORVASTATIN (LIPITOR) 40 MG TABLET    TAKE 1 TABLET EVERY EVENING    AZATHIOPRINE (IMURAN) 50 MG TAB    Take 1 tablet (50 mg total) by mouth 2 (two) times daily.    BACLOFEN (LIORESAL) 10 MG TABLET    Take 1 tablet (10 mg total) by mouth once daily.    BLOOD SUGAR DIAGNOSTIC (TRUE METRIX GLUCOSE TEST STRIP) STRP    Use  with blood glucose meter kit to test blood sugar four times  daily    BLOOD-GLUCOSE METER (TRUE METRIX AIR GLUCOSE METER) KIT    TEST FOUR TIMES DAILY BEFORE MEALS  AND EVERY NIGHT    DILTIAZEM (CARDIZEM SR) 60 MG CP12    Take 1 capsule (60 mg total) by mouth 2 (two) times daily.    ELIQUIS 5 MG TAB    Take 5 mg by mouth 2 (two) times daily.     FLUTICASONE-SALMETEROL 250-50 MCG/DOSE (ADVAIR DISKUS) 250-50 MCG/DOSE DISKUS INHALER    Inhale 1 puff into the lungs 2 (two) times daily. Controller    FOOD SUPPLEMT, LACTOSE-REDUCED (ENSURE) LIQD    Take 118 mLs by mouth 3 (three) times daily with meals.    FUROSEMIDE (LASIX) 40 MG TABLET    Take 2 tablets (80 mg total) by mouth every morning AND 1 tablet (40 mg total) every evening.    GABAPENTIN (NEURONTIN) 300 MG CAPSULE    Take 300 mg by mouth.    GABAPENTIN (NEURONTIN) 800 MG TABLET    TAKE 1 TABLET THREE TIMES DAILY    GLIMEPIRIDE (AMARYL) 2 MG TABLET    Take 1 tablet (2 mg total) by mouth before breakfast.    INHALATION SPACING DEVICE    Use as directed for inhalation.    INSULIN (LANTUS SOLOSTAR U-100 INSULIN) GLARGINE 100 UNITS/ML (3ML) SUBQ PEN    Inject 80 Units into the skin every evening.    INSULIN ASPART U-100 (NOVOLOG FLEXPEN U-100 INSULIN) 100 UNIT/ML (3 ML) INPN PEN    INJECT 20 UNITS SUBCUTANEOUSLY THREE TIMES DAILY WITH MEALS    INSULIN ASPART U-100 (NOVOLOG) 100 UNIT/ML (3 ML) INPN PEN    Inject 10 Units into the skin.    INSULIN SYRINGE-NEEDLE U-100 1 ML 31 GAUGE X 5/16 SYRG        IPRATROPIUM (ATROVENT) 0.02 % NEBULIZER SOLUTION    USE 1 VIAL VIA NEBULIZER FOUR TIMES DAILY    ISOSORBIDE MONONITRATE (IMDUR) 60 MG 24 HR TABLET    Take 1 tablet (60 mg total) by mouth once daily.    LANCETS 32 GAUGE MISC    1 lancet by Misc.(Non-Drug; Combo Route) route 2 (two) times daily.    LATANOPROST 0.005 % OPHTHALMIC SOLUTION    INSTILL 1 DROP INTO BOTH EYES EVERY EVENING    LISINOPRIL 10 MG TABLET    Take 1 tablet (10 mg total) by mouth once daily.    METFORMIN  "(GLUCOPHAGE) 1000 MG TABLET    TAKE 1 TABLET (1,000 MG TOTAL) BY MOUTH 2 (TWO) TIMES DAILY WITH MEALS.    METOLAZONE (ZAROXOLYN) 2.5 MG TABLET    Take 1 tablet (2.5 mg total) by mouth every Mon, Wed, Fri.    METOLAZONE (ZAROXOLYN) 5 MG TABLET    Take 2.5 mg by mouth.    METOPROLOL SUCCINATE (TOPROL-XL) 50 MG 24 HR TABLET    Take 1 tablet (50 mg total) by mouth once daily.    NITROGLYCERIN (NITROSTAT) 0.3 MG SL TABLET    PLACE 1 TABLET UNDER THE TONGUE EVERY 5 MINUTES AS NEEDED FOR CHEST PAIN, NO MORE THAN 3 TABLETS IN ONE DAY    PANTOPRAZOLE (PROTONIX) 40 MG TABLET    Take 1 tablet (40 mg total) by mouth once daily.    PEN NEEDLE, DIABETIC (BD ULTRA-FINE SHORT PEN NEEDLE) 31 GAUGE X 5/16" NDLE    Inject 1 each into the skin 3 (three) times daily.    PREDNISONE (DELTASONE) 10 MG TABLET    Take 1.5 tablets (15 mg total) by mouth once daily.    SEMAGLUTIDE (OZEMPIC) 0.25 MG OR 0.5 MG(2 MG/1.5 ML) PNIJ    Inject 0.5 mg into the skin every 7 days.    SERTRALINE (ZOLOFT) 100 MG TABLET    Take 100 mg by mouth.    SERTRALINE (ZOLOFT) 50 MG TABLET    TAKE 1 TABLET ONE TIME DAILY    TIOTROPIUM (SPIRIVA WITH HANDIHALER) 18 MCG INHALATION CAPSULE    Inhale 1 capsule (18 mcg total) into the lungs once daily. Controller    TRAZODONE (DESYREL) 100 MG TABLET    Take 200 mg by mouth.    TRAZODONE (DESYREL) 150 MG TABLET    TAKE 1 TABLET EVERY NIGHT    VIOS AEROSOL DELIVERY SYSTEM VIN    USE AS DIRESTED     Review of patient's allergies indicates:  No Known Allergies  Review of Systems   HENT: Negative for ear discharge and hearing loss.    Eyes: Negative for blurred vision and visual disturbance.   Cardiovascular: Negative for leg swelling.   Respiratory: Negative for shortness of breath.    Endocrine: Negative for polyuria.   Hematologic/Lymphatic: Negative for bleeding problem.   Musculoskeletal: Positive for joint pain and stiffness. Negative for back pain, muscle cramps and muscle weakness.   Genitourinary: Negative for " hematuria.   Neurological: Negative for loss of balance and paresthesias.   Psychiatric/Behavioral: Negative for altered mental status.       Objective:   Body mass index is 33.45 kg/m².  Vitals:    12/20/19 0857   BP: 135/83   Pulse: 81                General    Vitals reviewed.  Constitutional: He is oriented to person, place, and time. He appears well-developed and well-nourished. No distress.   HENT:   Head: Atraumatic.   Nose: Nose normal.   Eyes: EOM are normal. Right eye exhibits no discharge. Left eye exhibits no discharge.   Neck: Neck supple.   Cardiovascular: Normal rate and intact distal pulses.    Pulmonary/Chest: Effort normal. No respiratory distress.   Neurological: He is alert and oriented to person, place, and time. Coordination normal.   Psychiatric: He has a normal mood and affect. His behavior is normal. Judgment and thought content normal.         Right Shoulder Exam     Inspection/Observation   Swelling: absent  Bruising: absent  Scars: absent  Deformity: absent  Scapular Winging: absent  Scapular Dyskinesia: negative  Atrophy: absent    Tenderness   The patient is experiencing no tenderness.    Range of Motion   Active abduction: 90   Passive abduction: 100   Extension: 0   Forward Flexion: 180   Forward Elevation: 180Adduction: 40   External Rotation 0 degrees: 50   Internal rotation 0 degrees: T8     Tests & Signs   Drop arm: negative  Arevalo test: negative  Impingement: negative  Lift Off Sign: negative  Active Compression Test (Lenoir's Sign): negative  Speed's Test: negative    Other   Sensation: normal    Left Shoulder Exam     Inspection/Observation   Swelling: absent  Bruising: absent  Scars: absent  Deformity: absent  Scapular Winging: absent  Scapular Dyskinesia: negative  Atrophy: absent    Tenderness   The patient is tender to palpation of the acromioclavicular joint, biceps tendon and supraspinatus.    Range of Motion   Active abduction: 90   Passive abduction: 100   Extension:  0   Forward Flexion: 90   Forward Elevation: 170Adduction: 40   External Rotation 0 degrees: 50   Internal rotation 0 degrees: Sacrum     Tests & Signs   Drop arm: negative  Arevalo test: positive  Impingement: positive  Rotator Cuff Painful Arc/Range: severe  Lift Off Sign: positive  Active Compression test (Latah's Sign): negative  Speed's Test: positive  Bear Hug: negative    Other   Sensation: normal       Muscle Strength   Right Upper Extremity   Shoulder Abduction: 5/5   Shoulder Internal Rotation: 5/5   Shoulder External Rotation: 5/5   Supraspinatus: 5/5/5   Subscapularis: 5/5/5   Biceps: 5/5/5   Left Upper Extremity  Shoulder Abduction: 5/5   Shoulder Internal Rotation: 4/5   Shoulder External Rotation: 4/5   Supraspinatus: 3/5/5   Subscapularis: 4/5/5   Biceps: 5/5/5     Reflexes     Left Side  Biceps:  2+  Triceps:  2+    Right Side   Biceps:  2+  Triceps:  2+    Vascular Exam     Right Pulses      Radial:                    2+      Left Pulses      Radial:                    2+      Capillary Refill  Right Hand: normal capillary refill  Left Hand: normal capillary refill      Relevant imaging results reviewed and interpreted by me, discussed with the patient and / or family today     Reading Physician Reading Date Result Priority   Jame Stephens MD 12/2/2019 Routine      Narrative     EXAMINATION:  XR SHOULDER COMPLETE 2 OR MORE VIEWS LEFT    CLINICAL HISTORY:  Pain in left shoulder    TECHNIQUE:  Two or three views of the left shoulder were performed.    COMPARISON:  None    FINDINGS:  There is no radiographic evidence of acute osseous, articular, or soft tissue abnormality.  There are mild degenerative changes present at the AC and glenohumeral joints.      Impression       As above.  No acute findings.      Electronically signed by: Jame Stephens MD  Date: 12/02/2019  Time: 10:22        Reading Physician Reading Date Result Priority   Jame Stephens MD 12/16/2019 Routine      Narrative      EXAMINATION:  MRI SHOULDER WITHOUT CONTRAST LEFT    CLINICAL HISTORY:  left shoulder pain;  Pain in left shoulder    TECHNIQUE:  Multiplanar/multisequence MRI of the left shoulder was performed without the use of intravenous or intra-articular gadolinium.    COMPARISON:  Radiographs dated 12/02/2019    FINDINGS:  Rotator cuff: There is a massive full-thickness rotator cuff tear involving the full widths of the supraspinatus and infraspinatus tendons spanning approximately 6 cm AP with retraction to the level of the glenoid.  There is moderate tendinosis of the subscapularis tendon with no definite tear visualized.  The teres minor tendon appears intact.  Fatty atrophy of the supraspinatus and infraspinatus muscle bellies is noted.    Labrum: Global degenerative changes noted.    Long head of the biceps: Intact    Bones: No evidence of fracture or marrow replacement process.    AC joint: Within normal limits.    Cartilage: No large glenohumeral articular cartilage defect demonstrated on this non arthrographic study.    Miscellaneous: Mild arthrosis      Impression       Massive rotator cuff tear as above with associated atrophy of the supraspinatus and infraspinatus muscle bellies.    Moderate tendinosis of the subscapularis.      Electronically signed by: Jame Stephens MD  Date: 12/16/2019  Time: 08:12       Assessment:     Encounter Diagnoses   Name Primary?    Acute pain of left shoulder Yes    Traumatic tear of left rotator cuff, unspecified tear extent, initial encounter     Left shoulder pain, unspecified chronicity     Pain     Cervical radiculopathy     Lumbar radiculopathy         Plan:     Reviewed imaging findings with the patient today.  He has multiple medical comorbidities, he states that he currently need some sort of cardiac intervention that is cardiologist spoken to him about.  Will proceed with conservative treatment at this time for rotator cuff tear.  Shoulder injection  today  PT/OT  Neurosurgery for cervical lumbar radiculopathies  External pain management for prescription pain medication  F/up 2-3mo.  Discussed arthroscopic rotator cuff repair as an option when medically optimized      PROCEDURE NOTE:  After time out was performed, including verification of patient ID, procedure, site and side, availability of information and equipment, review of safety issues, and agreement with consent, the procedure site was marked and the patient was prepped aseptically.  The shoulder was prepped with betadyne and alcohol. The left subacromial space was injected with 1 cc 40 mg kenalog and 2 cc lidocaine and 2 cc .5% marcaine.   Bandaid was applied. Patient was reminded to rest with RICE for 48 hours post injection and to call clinic immediately for any adverse side effects as explained in clinic today.         Disclaimer: This note was prepared using a voice recognition system and is likely to have sound alike errors within the text.

## 2019-12-26 ENCOUNTER — TELEPHONE (OUTPATIENT)
Dept: ELECTROPHYSIOLOGY | Facility: CLINIC | Age: 64
End: 2019-12-26

## 2019-12-30 ENCOUNTER — TELEPHONE (OUTPATIENT)
Dept: ELECTROPHYSIOLOGY | Facility: CLINIC | Age: 64
End: 2019-12-30

## 2019-12-30 DIAGNOSIS — Z01.89 ENCOUNTER FOR OTHER SPECIFIED SPECIAL EXAMINATIONS: ICD-10-CM

## 2019-12-30 DIAGNOSIS — I48.91 ATRIAL FIBRILLATION WITH RVR: Primary | ICD-10-CM

## 2019-12-30 NOTE — TELEPHONE ENCOUNTER
----- Message from Kinza Nguyen MA sent at 12/30/2019  1:51 PM CST -----  Contact: Self      ----- Message -----  From: Nicole El  Sent: 12/30/2019   1:50 PM CST  To: Kevin Ledezma- returning your call regarding scheduling procedure.  Thanks    [phone]

## 2019-12-30 NOTE — TELEPHONE ENCOUNTER
Attempted to contact patient in order to reschedule his procedure. No answer, left message to please return call.

## 2019-12-31 ENCOUNTER — TELEPHONE (OUTPATIENT)
Dept: ELECTROPHYSIOLOGY | Facility: CLINIC | Age: 64
End: 2019-12-31

## 2019-12-31 NOTE — TELEPHONE ENCOUNTER
----- Message from Kristin Reyes MA sent at 12/31/2019 10:50 AM CST -----  Contact: pt      ----- Message -----  From: Ginette Almanzar  Sent: 12/31/2019  10:45 AM CST  To: Kevin Powers Staff    Pt would like to be called back regarding his surgery    Pt can be reached at [phone]

## 2020-01-06 ENCOUNTER — TELEPHONE (OUTPATIENT)
Dept: NEUROSURGERY | Facility: CLINIC | Age: 65
End: 2020-01-06

## 2020-01-06 DIAGNOSIS — H40.1134 PRIMARY OPEN ANGLE GLAUCOMA OF BOTH EYES, INDETERMINATE STAGE: ICD-10-CM

## 2020-01-06 DIAGNOSIS — M47.26 OSTEOARTHRITIS OF SPINE WITH RADICULOPATHY, LUMBAR REGION: ICD-10-CM

## 2020-01-06 DIAGNOSIS — H40.1132 PRIMARY OPEN ANGLE GLAUCOMA (POAG) OF BOTH EYES, MODERATE STAGE: ICD-10-CM

## 2020-01-06 DIAGNOSIS — M51.26 HNP (HERNIATED NUCLEUS PULPOSUS), LUMBAR: ICD-10-CM

## 2020-01-06 RX ORDER — GABAPENTIN 800 MG/1
800 TABLET ORAL 3 TIMES DAILY
Qty: 270 TABLET | Refills: 4 | Status: SHIPPED | OUTPATIENT
Start: 2020-01-06 | End: 2020-09-11 | Stop reason: SDUPTHER

## 2020-01-06 NOTE — TELEPHONE ENCOUNTER
----- Message from Isabel Grayson sent at 1/6/2020  3:41 PM CST -----  Contact: Pt   Type:  RX Refill Request    Who Called: Pt   Refill or New Rx:refill   RX Name and Strength: gabapentin (NEURONTIN) 800 MG  How is the patient currently taking it? (ex. 1XDay): 3 times daily   Is this a 30 day or 90 day RX:30  Preferred Pharmacy with phone number:   [id number] - [place] - [address] AT [place] [facility]  [address] [place] [address]  Phone: [phone] Fax: [phone]  Local or Mail Order: local   Ordering Provider: weeks  Would the patient rather a call back or a response via [facility]? Call back   Best Call Back Number: [phone] (home) [phone] (work)  Additional Information: n/a

## 2020-01-06 NOTE — TELEPHONE ENCOUNTER
Pr rescheduled due to no cervical MRI imaging on 1/22/2020 ar 8am.    Pt verbalized understanding.       - - -

## 2020-01-06 NOTE — TELEPHONE ENCOUNTER
No MRI images (cervical - Neck)  noted in Epic nor media. Plan to contact patient to see if there are any outside images outside [facility] within 6 mths to 1 year.

## 2020-01-07 RX ORDER — LATANOPROST 50 UG/ML
SOLUTION/ DROPS OPHTHALMIC
Qty: 1 BOTTLE | Refills: 2 | Status: SHIPPED | OUTPATIENT
Start: 2020-01-07 | End: 2020-11-24 | Stop reason: SDUPTHER

## 2020-01-09 ENCOUNTER — EXTERNAL HOME HEALTH (OUTPATIENT)
Dept: HOME HEALTH SERVICES | Facility: HOSPITAL | Age: 65
End: 2020-01-09
Payer: MEDICARE

## 2020-01-14 ENCOUNTER — OFFICE VISIT (OUTPATIENT)
Dept: INTERNAL MEDICINE | Facility: CLINIC | Age: 65
End: 2020-01-14
Payer: MEDICARE

## 2020-01-14 VITALS
RESPIRATION RATE: 16 BRPM | HEIGHT: 65 IN | HEART RATE: 94 BPM | BODY MASS INDEX: 33.46 KG/M2 | SYSTOLIC BLOOD PRESSURE: 122 MMHG | DIASTOLIC BLOOD PRESSURE: 77 MMHG | OXYGEN SATURATION: 97 % | TEMPERATURE: 97 F | WEIGHT: 200.81 LBS

## 2020-01-14 DIAGNOSIS — I48.0 PAF (PAROXYSMAL ATRIAL FIBRILLATION): ICD-10-CM

## 2020-01-14 DIAGNOSIS — M54.32 LEFT SIDED SCIATICA: ICD-10-CM

## 2020-01-14 DIAGNOSIS — S46.012A TRAUMATIC TEAR OF LEFT ROTATOR CUFF, UNSPECIFIED TEAR EXTENT, INITIAL ENCOUNTER: Primary | ICD-10-CM

## 2020-01-14 PROCEDURE — 99214 OFFICE O/P EST MOD 30 MIN: CPT | Mod: HCNC,S$GLB,, | Performed by: FAMILY MEDICINE

## 2020-01-14 PROCEDURE — 99214 PR OFFICE/OUTPT VISIT, EST, LEVL IV, 30-39 MIN: ICD-10-PCS | Mod: HCNC,S$GLB,, | Performed by: FAMILY MEDICINE

## 2020-01-14 PROCEDURE — 99999 PR PBB SHADOW E&M-EST. PATIENT-LVL V: ICD-10-PCS | Mod: PBBFAC,HCNC,, | Performed by: FAMILY MEDICINE

## 2020-01-14 PROCEDURE — 99215 OFFICE O/P EST HI 40 MIN: CPT | Mod: PBBFAC,HCNC,PO | Performed by: FAMILY MEDICINE

## 2020-01-14 PROCEDURE — 99999 PR PBB SHADOW E&M-EST. PATIENT-LVL V: CPT | Mod: PBBFAC,HCNC,, | Performed by: FAMILY MEDICINE

## 2020-01-14 NOTE — ASSESSMENT & PLAN NOTE
-rate controlled. Continue metoprolol and apixaban. continue follow-up with cards who discussed possible intervention

## 2020-01-14 NOTE — PROGRESS NOTES
Patient ID: Crow Green is a 64 y.o. male.    Chief Complaint: Shoulder Pain    HPI 64-year-old male here with complaints of left shoulder pain and back pain.  He is followed by sports medicine for left rotator cuff tear.  At last appointment a month ago he was given left shoulder steroid shot which he says provided relief.  No worsening of pain. Pain aggravated when attempting to lift arm. Was referred to PT/OT but was confused regarding appt location and missed PT appt. Does have prior diagnosis of lumbar disc bulge and bilateral facet arthropathy. Was previously evaluated by neurosurgery and pain management. Does report previous physical therapy for back with little relief.  Back pain aggravated with long periods of standing and ambulation. Pain radiates to left leg. No weakness or recent falls.    Family History   Problem Relation Age of Onset    Glaucoma Mother     Cataracts Mother     Cataracts Father     Glaucoma Sister     Cataracts Sister     Glaucoma Maternal Aunt     No Known Problems Brother     No Known Problems Daughter     No Known Problems Son     Prostate cancer Neg Hx        Current Outpatient Medications:     ALCOHOL ANTISEPTIC PADS (ALCOHOL PREP PADS TOP), , Disp: , Rfl:     amLODIPine (NORVASC) 10 MG tablet, Take 10 mg by mouth once daily. , Disp: , Rfl:     amoxicillin (AMOXIL) 500 MG capsule, , Disp: , Rfl: 0    atorvastatin (LIPITOR) 40 MG tablet, TAKE 1 TABLET EVERY EVENING, Disp: 90 tablet, Rfl: 4    azaTHIOprine (IMURAN) 50 mg Tab, Take 1 tablet (50 mg total) by mouth 2 (two) times daily., Disp: 180 tablet, Rfl: 1    baclofen (LIORESAL) 10 MG tablet, Take 1 tablet (10 mg total) by mouth once daily., Disp: 90 tablet, Rfl: 3    blood sugar diagnostic (TRUE METRIX GLUCOSE TEST STRIP) Strp, Use with blood glucose meter kit to test blood sugar four times  daily, Disp: 200 strip, Rfl: 11    blood-glucose meter (TRUE METRIX AIR GLUCOSE METER) kit, TEST FOUR TIMES DAILY  BEFORE MEALS  AND EVERY NIGHT, Disp: 1 each, Rfl: 0    diltiaZEM (CARDIZEM SR) 60 MG Cp12, Take 1 capsule (60 mg total) by mouth 2 (two) times daily., Disp: 60 capsule, Rfl: 11    ELIQUIS 5 mg Tab, Take 5 mg by mouth 2 (two) times daily. , Disp: , Rfl:     furosemide (LASIX) 40 MG tablet, Take 2 tablets (80 mg total) by mouth every morning AND 1 tablet (40 mg total) every evening., Disp: 270 tablet, Rfl: 0    gabapentin (NEURONTIN) 800 MG tablet, Take 1 tablet (800 mg total) by mouth 3 (three) times daily., Disp: 270 tablet, Rfl: 4    glimepiride (AMARYL) 2 MG tablet, Take 1 tablet (2 mg total) by mouth before breakfast., Disp: 90 tablet, Rfl: 3    inhalation spacing device, Use as directed for inhalation., Disp: 1 Device, Rfl: 0    insulin aspart U-100 (NOVOLOG FLEXPEN U-100 INSULIN) 100 unit/mL (3 mL) InPn pen, INJECT 20 UNITS SUBCUTANEOUSLY THREE TIMES DAILY WITH MEALS, Disp: 15 mL, Rfl: 0    insulin aspart U-100 (NOVOLOG) 100 unit/mL (3 mL) InPn pen, Inject 10 Units into the skin., Disp: , Rfl:     insulin syringe-needle U-100 1 mL 31 gauge x 5/16 Syrg, , Disp: , Rfl:     ipratropium (ATROVENT) 0.02 % nebulizer solution, USE 1 VIAL VIA NEBULIZER FOUR TIMES DAILY, Disp: 937.5 mL, Rfl: 2    isosorbide mononitrate (IMDUR) 60 MG 24 hr tablet, Take 1 tablet (60 mg total) by mouth once daily., Disp: 90 tablet, Rfl: 3    lancets 32 gauge Misc, 1 lancet by Misc.(Non-Drug; Combo Route) route 2 (two) times daily., Disp: 100 each, Rfl: 11    latanoprost 0.005 % ophthalmic solution, INSTILL 1 DROP INTO BOTH EYES EVERY EVENING, Disp: 1 Bottle, Rfl: 2    lisinopril 10 MG tablet, Take 1 tablet (10 mg total) by mouth once daily., Disp: 90 tablet, Rfl: 3    metFORMIN (GLUCOPHAGE) 1000 MG tablet, TAKE 1 TABLET (1,000 MG TOTAL) BY MOUTH 2 (TWO) TIMES DAILY WITH MEALS., Disp: 180 tablet, Rfl: 0    metOLazone (ZAROXOLYN) 2.5 MG tablet, Take 1 tablet (2.5 mg total) by mouth every Mon, Wed, Fri., Disp: 36 tablet, Rfl:  "3    metOLazone (ZAROXOLYN) 5 MG tablet, Take 2.5 mg by mouth., Disp: , Rfl:     metoprolol succinate (TOPROL-XL) 50 MG 24 hr tablet, Take 1 tablet (50 mg total) by mouth once daily., Disp: 90 tablet, Rfl: 3    nitroGLYCERIN (NITROSTAT) 0.3 MG SL tablet, PLACE 1 TABLET UNDER THE TONGUE EVERY 5 MINUTES AS NEEDED FOR CHEST PAIN, NO MORE THAN 3 TABLETS IN ONE DAY, Disp: 100 tablet, Rfl: 0    pantoprazole (PROTONIX) 40 MG tablet, Take 1 tablet (40 mg total) by mouth once daily., Disp: 30 tablet, Rfl: 11    pen needle, diabetic (BD ULTRA-FINE SHORT PEN NEEDLE) 31 gauge x 5/16" Ndle, Inject 1 each into the skin 3 (three) times daily., Disp: 100 each, Rfl: 11    predniSONE (DELTASONE) 10 MG tablet, Take 1.5 tablets (15 mg total) by mouth once daily., Disp: 50 tablet, Rfl: 1    semaglutide (OZEMPIC) 0.25 mg or 0.5 mg(2 mg/1.5 mL) PnIj, Inject 0.5 mg into the skin every 7 days., Disp: 1.5 mL, Rfl: 0    sertraline (ZOLOFT) 100 MG tablet, Take 100 mg by mouth., Disp: , Rfl:     sertraline (ZOLOFT) 50 MG tablet, TAKE 1 TABLET ONE TIME DAILY, Disp: 90 tablet, Rfl: 3    traZODone (DESYREL) 150 MG tablet, TAKE 1 TABLET EVERY NIGHT, Disp: 90 tablet, Rfl: 1    VIOS AEROSOL DELIVERY SYSTEM Shefali, USE AS DIRESTED, Disp: , Rfl: 0    aspirin (ECOTRIN) 81 MG EC tablet, Take 1 tablet (81 mg total) by mouth once daily., Disp: , Rfl: 0    fluticasone-salmeterol 250-50 mcg/dose (ADVAIR DISKUS) 250-50 mcg/dose diskus inhaler, Inhale 1 puff into the lungs 2 (two) times daily. Controller, Disp: 60 each, Rfl: 11    food supplemt, lactose-reduced (ENSURE) Liqd, Take 118 mLs by mouth 3 (three) times daily with meals. (Patient not taking: Reported on 1/14/2020), Disp: 1 Bottle, Rfl: 4    insulin (LANTUS SOLOSTAR U-100 INSULIN) glargine 100 units/mL (3mL) SubQ pen, Inject 80 Units into the skin every evening., Disp: 72 mL, Rfl: 0    tiotropium (SPIRIVA WITH HANDIHALER) 18 mcg inhalation capsule, Inhale 1 capsule (18 mcg total) into " "the lungs once daily. Controller, Disp: 90 capsule, Rfl: 0    traZODone (DESYREL) 100 MG tablet, Take 200 mg by mouth., Disp: , Rfl:     Review of Systems   Constitutional: Negative for chills and fever.   Respiratory: Negative for cough, shortness of breath and wheezing.    Cardiovascular: Negative for chest pain.   Gastrointestinal: Negative for abdominal pain, diarrhea and nausea.   Musculoskeletal: Positive for back pain. Negative for arthralgias.        Shoulder pain, left   Skin: Negative for rash.   Neurological: Negative for dizziness and headaches.   Psychiatric/Behavioral: Negative for sleep disturbance. The patient is not nervous/anxious.        Objective:   /77 (BP Location: Right arm, Patient Position: Sitting, BP Method: Large (Automatic))   Pulse 94   Temp 96.7 °F (35.9 °C) (Tympanic)   Resp 16   Ht 5' 5" (1.651 m)   Wt 91.1 kg (200 lb 13.4 oz)   SpO2 97%   BMI 33.42 kg/m²      Physical Exam   Constitutional: He appears well-developed and well-nourished. No distress.   Cardiovascular: Normal rate.   irregular rhythm   Pulmonary/Chest: Effort normal and breath sounds normal.   Musculoskeletal: He exhibits tenderness (bilateral lumbar).   +left straight leg test. Unable to raise left shoulder past midline   Skin: He is not diaphoretic.       Assessment & Plan     Problem List Items Addressed This Visit        Cardiac/Vascular    PAF (paroxysmal atrial fibrillation)    Current Assessment & Plan     -rate controlled. Continue metoprolol and apixaban. continue follow-up with cards who discussed possible intervention            Orthopedic    Left sided sciatica    Current Assessment & Plan     -has history of lumbar disc bulge and chronic back pain.  Does have upcoming follow-up with neurosurgery         Traumatic tear of left rotator cuff - Primary    Current Assessment & Plan     -needs PT/OT as recommended by sports medicine. continue follow-up with sports medicine                    "

## 2020-01-14 NOTE — ASSESSMENT & PLAN NOTE
-has history of lumbar disc bulge and chronic back pain.  Does have upcoming follow-up with neurosurgery

## 2020-01-15 DIAGNOSIS — Z79.4 TYPE 2 DIABETES MELLITUS WITH MILD NONPROLIFERATIVE RETINOPATHY, WITH LONG-TERM CURRENT USE OF INSULIN, MACULAR EDEMA PRESENCE UNSPECIFIED, UNSPECIFIED LATERALITY: ICD-10-CM

## 2020-01-15 DIAGNOSIS — E11.3299 TYPE 2 DIABETES MELLITUS WITH MILD NONPROLIFERATIVE RETINOPATHY, WITH LONG-TERM CURRENT USE OF INSULIN, MACULAR EDEMA PRESENCE UNSPECIFIED, UNSPECIFIED LATERALITY: ICD-10-CM

## 2020-01-16 RX ORDER — FUROSEMIDE 40 MG/1
TABLET ORAL
Qty: 270 TABLET | Refills: 0 | Status: SHIPPED | OUTPATIENT
Start: 2020-01-16 | End: 2020-06-10

## 2020-01-16 RX ORDER — GLIMEPIRIDE 2 MG/1
2 TABLET ORAL
Qty: 90 TABLET | Refills: 3 | Status: SHIPPED | OUTPATIENT
Start: 2020-01-16 | End: 2021-03-23 | Stop reason: SDUPTHER

## 2020-01-16 RX ORDER — INSULIN GLARGINE 100 [IU]/ML
INJECTION, SOLUTION SUBCUTANEOUS
Qty: 75 ML | Refills: 4 | Status: SHIPPED | OUTPATIENT
Start: 2020-01-16 | End: 2020-03-23 | Stop reason: SDUPTHER

## 2020-01-22 ENCOUNTER — OFFICE VISIT (OUTPATIENT)
Dept: NEUROSURGERY | Facility: CLINIC | Age: 65
End: 2020-01-22
Payer: MEDICARE

## 2020-01-22 ENCOUNTER — HOSPITAL ENCOUNTER (OUTPATIENT)
Dept: RADIOLOGY | Facility: HOSPITAL | Age: 65
Discharge: HOME | End: 2020-01-22
Attending: PHYSICIAN ASSISTANT
Payer: MEDICARE

## 2020-01-22 VITALS
HEART RATE: 88 BPM | WEIGHT: 206.81 LBS | BODY MASS INDEX: 34.46 KG/M2 | SYSTOLIC BLOOD PRESSURE: 118 MMHG | HEIGHT: 65 IN | DIASTOLIC BLOOD PRESSURE: 78 MMHG

## 2020-01-22 DIAGNOSIS — M54.50 LOW BACK PAIN, NON-SPECIFIC: ICD-10-CM
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
## 2020-01-22 DIAGNOSIS — M54.16 LUMBAR RADICULOPATHY: ICD-10-CM

## 2020-01-22 DIAGNOSIS — M51.36 DEGENERATIVE DISC DISEASE, LUMBAR: Primary | ICD-10-CM

## 2020-01-22 DIAGNOSIS — M51.36 DEGENERATIVE DISC DISEASE, LUMBAR: ICD-10-CM

## 2020-01-22 PROCEDURE — 3008F PR BODY MASS INDEX (BMI) DOCUMENTED: ICD-10-PCS | Mod: HCNC,CPTII,S$GLB, | Performed by: PHYSICIAN ASSISTANT

## 2020-01-22 PROCEDURE — 72110 XR LUMBAR SPINE 5 VIEW WITH FLEX AND EXT: ICD-10-PCS | Mod: 26,HCNC,, | Performed by: RADIOLOGY

## 2020-01-22 PROCEDURE — 3078F PR MOST RECENT DIASTOLIC BLOOD PRESSURE < 80 MM HG: ICD-10-PCS | Mod: HCNC,CPTII,S$GLB, | Performed by: PHYSICIAN ASSISTANT

## 2020-01-22 PROCEDURE — 73521 X-RAY EXAM HIPS BI 2 VIEWS: CPT | Mod: 26,HCNC,, | Performed by: RADIOLOGY

## 2020-01-22 PROCEDURE — 3008F BODY MASS INDEX DOCD: CPT | Mod: HCNC,CPTII,S$GLB, | Performed by: PHYSICIAN ASSISTANT

## 2020-01-22 PROCEDURE — 72110 X-RAY EXAM L-2 SPINE 4/>VWS: CPT | Mod: TC,HCNC

## 2020-01-22 PROCEDURE — 99999 PR PBB SHADOW E&M-EST. PATIENT-LVL V: CPT | Mod: PBBFAC,HCNC,, | Performed by: PHYSICIAN ASSISTANT

## 2020-01-22 PROCEDURE — 99999 PR PBB SHADOW E&M-EST. PATIENT-LVL V: ICD-10-PCS | Mod: PBBFAC,HCNC,, | Performed by: PHYSICIAN ASSISTANT

## 2020-01-22 PROCEDURE — 73521 X-RAY EXAM HIPS BI 2 VIEWS: CPT | Mod: TC,HCNC

## 2020-01-22 PROCEDURE — 99214 OFFICE O/P EST MOD 30 MIN: CPT | Mod: HCNC,S$GLB,, | Performed by: PHYSICIAN ASSISTANT

## 2020-01-22 PROCEDURE — 3074F SYST BP LT 130 MM HG: CPT | Mod: HCNC,CPTII,S$GLB, | Performed by: PHYSICIAN ASSISTANT

## 2020-01-22 PROCEDURE — 72110 X-RAY EXAM L-2 SPINE 4/>VWS: CPT | Mod: 26,HCNC,, | Performed by: RADIOLOGY

## 2020-01-22 PROCEDURE — 73521 XR HIPS BILATERAL 2 VIEW INCL AP PELVIS: ICD-10-PCS | Mod: 26,HCNC,, | Performed by: RADIOLOGY

## 2020-01-22 PROCEDURE — 3074F PR MOST RECENT SYSTOLIC BLOOD PRESSURE < 130 MM HG: ICD-10-PCS | Mod: HCNC,CPTII,S$GLB, | Performed by: PHYSICIAN ASSISTANT

## 2020-01-22 PROCEDURE — 3078F DIAST BP <80 MM HG: CPT | Mod: HCNC,CPTII,S$GLB, | Performed by: PHYSICIAN ASSISTANT

## 2020-01-22 PROCEDURE — 99214 PR OFFICE/OUTPT VISIT, EST, LEVL IV, 30-39 MIN: ICD-10-PCS | Mod: HCNC,S$GLB,, | Performed by: PHYSICIAN ASSISTANT

## 2020-01-22 RX ORDER — KETOROLAC TROMETHAMINE 10 MG/1
10 TABLET, FILM COATED ORAL EVERY 8 HOURS PRN
Qty: 20 TABLET | Refills: 0 | Status: ON HOLD | OUTPATIENT
Start: 2020-01-22 | End: 2020-07-05

## 2020-01-22 NOTE — PROGRESS NOTES
Subjective:      Patient ID: Crow Green is a 64 y.o. male.    Chief Complaint: Lumbar Spine Pain (L-Spine) and Follow-up    HPI   The patient presents to clinic due to lower back and hip pain. This has been ongoing for years however it is worse on the L side. He reports a fall in November- his feet became tangled in the sheets and he hit the wall and fell onto the L shoulder.   He rates his pain a 10/10. Patient is not taking anything for his pain. His last MRI was in June of 2019. His symptoms have worsened since then. He also complains of numbness, tingling and weakness in both legs and hands. He is ambulating with a Rolator for balance.   He denies bladder incontinence. He does report bowel incontinence for the past 2 weeks usually while sleeping at night.     Patient was previously seen by MD several months.     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     Review of Systems   Constitution: Negative for fever.   HENT: Negative for congestion.    Respiratory: Negative for cough and wheezing.    Skin: Negative for poor wound healing.   Musculoskeletal: Positive for back pain, falls, muscle weakness and myalgias. Negative for joint pain and neck pain.   Gastrointestinal: Negative for abdominal pain, bowel incontinence, diarrhea, nausea and vomiting.   Genitourinary: Negative for bladder incontinence.   Neurological: Positive for numbness. Negative for seizures.   Psychiatric/Behavioral: Negative for altered mental status.         Objective:            Ortho/SPM Exam    Neurosurgery Physical Exam  Ortho/SPM Exam     Nursing note and vitals reviewed  Gen:Oriented to person, place, and time.             Appears stated age   Skin: no rashes or lesions identified   Head:Normocephalic and atraumatic.  Nose: Nose normal.    Eyes: EOM are normal. Pupils are equal, round, and reactive to light.   Neck: Neck supple. No masses or lesions  palpated  Cardiovascular: Intact distal pulses.    Abdominal: Soft.   Neurological: Alert and oriented to person, place, and time.  No cranial nerve deficit.  Coordination normal. Normal muscle tone  Psychiatric: Normal mood and affect. Behavior is normal.        Back:    Spasms posteriorly Paraspinal muscle spasms     Present Pain with flexion and extention     Decreased Range of motion      Polo, L >R Straight leg raise      Motor   Right Right Left Left  Level Group   5 4+  5  4 L2 Hip flexor (Psoas)   5 4+  5  4 L3 Leg extension (Quads)   5  5 5  4 L4 Dorsiflexion & foot inversion (Tibialis Anterior)   5  5 5  4 L5 Great toe extension ( EHL)   5  5 5  4 S1 Foot eversion (Gastroc, PL & PB)      Sensation  NL Decreased (R/L/BL) Level Sensation    X   L2 Anterio-medial thigh   X   L3 Medial thigh around knee   X   L4 Medial foot   X   L5 Dorsum foot   X   S1 Lateral foot      Reflex  2+   Patellar tendon (L4)   2+   Achilles tendon (S1)                    Assessment:       Encounter Diagnoses   Name Primary?    Low back pain, non-specific     Degenerative disc disease, lumbar Yes    Lumbar radiculopathy     Fall, initial encounter           Plan:       Crow was seen today for lumbar spine pain (l-spine) and follow-up.    Diagnoses and all orders for this visit:    Degenerative disc disease, lumbar  -     X-Ray Lumbar Complete With Flex And Ext; Future    Low back pain, non-specific  -     MRI Lumbar Spine Without Contrast; Future    Lumbar radiculopathy  -     X-Ray Lumbar Complete With Flex And Ext; Future    Fall, initial encounter  -     X-Ray Lumbar Complete With Flex And Ext; Future  -     X-Ray Hips Bilateral 2 View Incl AP Pelvis; Future    Other orders  -     ketorolac (TORADOL) 10 mg tablet; Take 1 tablet (10 mg total) by mouth every 8 (eight) hours as needed for Pain.        Since the patient had a recent fall and his symptoms are worse, will recommend new imaging studies.   He will get X-rays today  on the way out and MRI to be scheduled for further evaluation.  Patient will follow-up after imaging studies for discussion.   Rx given for Toradol today.          Rivera Roa PA-C

## 2020-01-22 NOTE — LETTER
January 22, 2020      Luis Antonio Zavaleta MD  [address]  [place] [address]           [facility]  [address]  [place] [address]  Phone: [phone]  Fax: [phone]          Patient: Crow Green   MR Number: [id number]   YOB: 1955   Date of Visit: 1/22/2020       Dear Dr. Luis Antonio Zavaleta:    Thank you for referring Crow Green to me for evaluation. Attached you will find relevant portions of my assessment and plan of care.    If you have questions, please do not hesitate to call me. I look forward to following Crow Green along with you.    Sincerely,    Rivera Roa PA-C    Enclosure  CC:  No Recipients    If you would like to receive this communication electronically, please contact [email or web address] or [phone] to request more information on [facility] access.    For providers and/or their staff who would like to refer a patient to [facility], please contact us through our one-stop-shop provider referral line, [facility], at [phone].    If you feel you have received this communication in error or would no longer like to receive these types of communications, please e-mail [email or web address]

## 2020-01-29 ENCOUNTER — TELEPHONE (OUTPATIENT)
Dept: PHARMACY | Facility: CLINIC | Age: 65
End: 2020-01-29

## 2020-02-03 ENCOUNTER — TELEPHONE (OUTPATIENT)
Dept: RADIOLOGY | Facility: HOSPITAL | Age: 65
End: 2020-02-03

## 2020-02-06 ENCOUNTER — TELEPHONE (OUTPATIENT)
Dept: ORTHOPEDICS | Facility: CLINIC | Age: 65
End: 2020-02-06

## 2020-02-06 NOTE — TELEPHONE ENCOUNTER
Spoke w/ patient, informed patient that I needed to r/s their appt due to the provider being out of office. Patient agreed and their appt is r/s. Patient verbalized understanding and was grateful for the call-DD

## 2020-02-10 ENCOUNTER — TELEPHONE (OUTPATIENT)
Dept: RHEUMATOLOGY | Facility: CLINIC | Age: 65
End: 2020-02-10

## 2020-02-10 NOTE — TELEPHONE ENCOUNTER
Called pt in ref to prev note, & to see if he could come in for f/u appt today @ 1. No answer LM to return call

## 2020-02-10 NOTE — TELEPHONE ENCOUNTER
----- Message from Katey Jacob PharmD sent at 2/7/2020  1:01 PM CST -----  Regarding: Imuran  Good Afternoon Dr. Lucero and Staff,    Mr. Green reports that a home health nurse helps him schedule is appointments and handles all of his medications. It appears the patient fills at multiple pharmacies and he is unable to confirm what he takes. [facility] has been trying to reach nurse for about a month now. Patient provided a number for [facility] to reach nurse today, but he was not confident the number was correct - number went to voicemail and message was left today.   Patient could not confirm missed doses of Imuran since he takes the medications that nurse arranges for him daily. Per our refill notes, it seems the patient has missed doses.  Is there any way the patient can be contacted to arrange a follow-up appointment and review adherence? Please advise and let me know if I can further assist. Thank you.    Katey Ingram, PharmD  Clinical Pharmacist  [facility]  [phone]

## 2020-02-10 NOTE — TELEPHONE ENCOUNTER
----- Message from Favian Amaya sent at 2/10/2020  3:29 PM CST -----  Contact: Pt  Please give pt a call at .[phone] he is returning the nurse call

## 2020-02-11 ENCOUNTER — DOCUMENTATION ONLY (OUTPATIENT)
Dept: REHABILITATION | Facility: HOSPITAL | Age: 65
End: 2020-02-11

## 2020-02-11 ENCOUNTER — TELEPHONE (OUTPATIENT)
Dept: RHEUMATOLOGY | Facility: CLINIC | Age: 65
End: 2020-02-11

## 2020-02-11 PROBLEM — M79.605 LEFT LEG PAIN: Status: RESOLVED | Noted: 2019-08-29 | Resolved: 2020-02-11

## 2020-02-11 NOTE — TELEPHONE ENCOUNTER
----- Message from Della Lucero MD sent at 2/11/2020 12:01 PM CST -----  Regarding: RE: Imstephania  Looks like have not seen him since august Thanks   ----- Message -----  From: Mague Marie LPN  Sent: 2/10/2020   7:13 AM CST  To: Della Lucero MD  Subject: FW: Imstephania                                       FYI... All the overbook slots are taken for the nxt 2weeks. We have an opening today @ 1pm. Will try to reach him today to see if he can make it if ok with you.  ----- Message -----  From: Katey Jacob PharmD  Sent: 2/7/2020   1:01 PM CST  To: Della Lucero MD, Nic Hull Staff  Subject: Imuran                                           Good Afternoon Dr. Lucero and Staff,    Mr. Green reports that a home health nurse helps him schedule is appointments and handles all of his medications. It appears the patient fills at multiple pharmacies and he is unable to confirm what he takes. [facility] has been trying to reach nurse for about a month now. Patient provided a number for [facility] to reach nurse today, but he was not confident the number was correct - number went to voicemail and message was left today.   Patient could not confirm missed doses of Imuran since he takes the medications that nurse arranges for him daily. Per our refill notes, it seems the patient has missed doses.  Is there any way the patient can be contacted to arrange a follow-up appointment and review adherence? Please advise and let me know if I can further assist. Thank you.    Katey Ingram, PharmD  Clinical Pharmacist  [facility]  [phone]

## 2020-02-11 NOTE — TELEPHONE ENCOUNTER
Called pt again to schedule f/u appt per prev notes. Pt scheduled for 3/11/20 @ 10:30. Sent reminder 1wk prior so pt can arrange for transportation

## 2020-02-11 NOTE — PROGRESS NOTES
Outpatient Therapy Discharge Summary     Name: Crow [facility] Number: [id number]    Therapy Diagnosis:        Encounter Diagnosis   Name Primary?    Left leg pain Yes      Physician: Sarmad Gregorio MD     Physician Orders: PT Eval and Treat   Medical Diagnosis from Referral: M79.605 (ICD-10-CM) - Left leg pain  Evaluation Date: 9/19/2019      Date of Last visit: 10/10/2019  Total Visits Received: 2  Cancelled Visits: 2  No Show Visits: 6    Assessment    Goals:  Short Term Goals: 2 weeks NOT MET  1. Recent signs and systems trend is improving in order to progress towards LTG's.  2. Patient will improve lumbar flexion AROM to 50% in order to progress towards functional activities.  3. Patient will be independent with HEP in order to further progress and return to maximal function.  4. Pain rating at Worst: 4/10 in order to progress towards increased independence with activity.  5. Improve postural awareness.      Long Term Goals: 4 weeks NOT MET  1. Patient will return to normal ADL, recreational, and work related activities with less pain and limitation.   2. Patient will improve lumbar sidebend AROM to 75% in order to return to maximal functional potential.   3. Patient will improve glute med strength to 4/5 in order to improve functional independence.   4. Patient will demonstrate normal gait mechanics in order to minimize any compensation and return to PLOF.   5. Patient will self report improvement to 50% ability on the Lower Extremity Functional Scale.      Patient did not return to therapy after his initial and 1 follow up visit.     Discharge reason: Patient has not attended therapy since 10/10/2019.    Plan   This patient is discharged from Physical Therapy

## 2020-02-11 NOTE — TELEPHONE ENCOUNTER
----- Message from Fouzia Timmons sent at 2/10/2020  3:16 PM CST -----  Contact: self/[phone]  Type:  Patient Returning Call    Who Called:Crow Green  Who Left Message for Patient:Mague  Does the patient know what this is regarding?:no  Would the patient rather a call back or a response via [facility]? Call back   Best Call Back Number:[phone]  Additional Information:

## 2020-02-12 ENCOUNTER — TELEPHONE (OUTPATIENT)
Dept: INTERNAL MEDICINE | Facility: CLINIC | Age: 65
End: 2020-02-12

## 2020-02-12 NOTE — TELEPHONE ENCOUNTER
Returned call to pt, he is stating the he would to have HH. He is having a hard time at home. Scheduled pt to see Dr. Lambert to discuss issues

## 2020-02-12 NOTE — TELEPHONE ENCOUNTER
----- Message from Jeniffer Montes sent at 2/12/2020  2:06 PM CST -----  Contact: self  needs call back regarding setting up home health, also diabetic shoes request..[phone]

## 2020-02-13 ENCOUNTER — TELEPHONE (OUTPATIENT)
Dept: ORTHOPEDICS | Facility: CLINIC | Age: 65
End: 2020-02-13

## 2020-02-13 ENCOUNTER — TELEPHONE (OUTPATIENT)
Dept: INTERNAL MEDICINE | Facility: CLINIC | Age: 65
End: 2020-02-13

## 2020-02-13 NOTE — TELEPHONE ENCOUNTER
----- Message from Yadiel Veras sent at 2/13/2020 10:50 AM CST -----  Contact: Pt  Please call Crow to discuss a concern he has [phone].

## 2020-02-13 NOTE — TELEPHONE ENCOUNTER
----- Message from Jeniffer Montes sent at 2/13/2020 11:01 AM CST -----  Contact: self  please send pain management referral[facility] at [phone]//ph :[phone]. pt call back is .[phone]

## 2020-02-13 NOTE — TELEPHONE ENCOUNTER
Spoke w/ patient in regards to him having a concern of his records and informed him that he needs to speak with medical records. Patient verbalized understanding and was grateful for the call-DD    ----- Message from Abi Mayen MA sent at 2/13/2020 10:59 AM CST -----  Contact: Pt      ----- Message -----  From: Yadiel Veras  Sent: 2/13/2020  10:50 AM CST  To: Meghann Salmon Staff    Please call Crow to discuss a concern he has [phone].

## 2020-02-13 NOTE — TELEPHONE ENCOUNTER
----- Message from Rosemary Parish sent at 2/13/2020  9:35 AM CST -----  Contact: Patient   Patient would like a call back concerning his diabetic shoes. Please call at Ph .[phone]

## 2020-02-17 ENCOUNTER — LAB VISIT (OUTPATIENT)
Dept: LAB | Facility: HOSPITAL | Age: 65
End: 2020-02-17
Attending: INTERNAL MEDICINE
Payer: MEDICARE

## 2020-02-17 DIAGNOSIS — I48.91 ATRIAL FIBRILLATION WITH RVR: ICD-10-CM

## 2020-02-17 LAB
ANION GAP SERPL CALC-SCNC: 9 MMOL/L (ref 8–16)
APTT BLDCRRT: 27.6 SEC (ref 21–32)
BASOPHILS # BLD AUTO: 0.01 K/UL (ref 0–0.2)
BASOPHILS NFR BLD: 0.1 % (ref 0–1.9)
BUN SERPL-MCNC: 22 MG/DL (ref 8–23)
CALCIUM SERPL-MCNC: 10 MG/DL (ref 8.7–10.5)
CHLORIDE SERPL-SCNC: 96 MMOL/L (ref 95–110)
CO2 SERPL-SCNC: 33 MMOL/L (ref 23–29)
CREAT SERPL-MCNC: 1.1 MG/DL (ref 0.5–1.4)
DIFFERENTIAL METHOD: ABNORMAL
EOSINOPHIL # BLD AUTO: 0.2 K/UL (ref 0–0.5)
EOSINOPHIL NFR BLD: 1.7 % (ref 0–8)
ERYTHROCYTE [DISTWIDTH] IN BLOOD BY AUTOMATED COUNT: 13.4 % (ref 11.5–14.5)
EST. GFR  (AFRICAN AMERICAN): >60 ML/MIN/1.73 M^2
EST. GFR  (NON AFRICAN AMERICAN): >60 ML/MIN/1.73 M^2
GLUCOSE SERPL-MCNC: 94 MG/DL (ref 70–110)
HCT VFR BLD AUTO: 43.1 % (ref 40–54)
HGB BLD-MCNC: 12.8 G/DL (ref 14–18)
IMM GRANULOCYTES # BLD AUTO: 0.02 K/UL (ref 0–0.04)
IMM GRANULOCYTES NFR BLD AUTO: 0.2 % (ref 0–0.5)
INR PPP: 1 (ref 0.8–1.2)
LYMPHOCYTES # BLD AUTO: 2.8 K/UL (ref 1–4.8)
LYMPHOCYTES NFR BLD: 27.3 % (ref 18–48)
MCH RBC QN AUTO: 27.1 PG (ref 27–31)
MCHC RBC AUTO-ENTMCNC: 29.7 G/DL (ref 32–36)
MCV RBC AUTO: 91 FL (ref 82–98)
MONOCYTES # BLD AUTO: 0.8 K/UL (ref 0.3–1)
MONOCYTES NFR BLD: 8.1 % (ref 4–15)
NEUTROPHILS # BLD AUTO: 6.5 K/UL (ref 1.8–7.7)
NEUTROPHILS NFR BLD: 62.6 % (ref 38–73)
NRBC BLD-RTO: 0 /100 WBC
PLATELET # BLD AUTO: 169 K/UL (ref 150–350)
PMV BLD AUTO: 12.7 FL (ref 9.2–12.9)
POTASSIUM SERPL-SCNC: 4.2 MMOL/L (ref 3.5–5.1)
PROTHROMBIN TIME: 10.5 SEC (ref 9–12.5)
RBC # BLD AUTO: 4.73 M/UL (ref 4.6–6.2)
SODIUM SERPL-SCNC: 138 MMOL/L (ref 136–145)
WBC # BLD AUTO: 10.3 K/UL (ref 3.9–12.7)

## 2020-02-17 PROCEDURE — 85610 PROTHROMBIN TIME: CPT | Mod: HCNC

## 2020-02-17 PROCEDURE — 36415 COLL VENOUS BLD VENIPUNCTURE: CPT | Mod: HCNC

## 2020-02-17 PROCEDURE — 85025 COMPLETE CBC W/AUTO DIFF WBC: CPT | Mod: HCNC

## 2020-02-17 PROCEDURE — 85730 THROMBOPLASTIN TIME PARTIAL: CPT | Mod: HCNC

## 2020-02-17 PROCEDURE — 80048 BASIC METABOLIC PNL TOTAL CA: CPT | Mod: HCNC

## 2020-02-21 ENCOUNTER — OFFICE VISIT (OUTPATIENT)
Dept: INTERNAL MEDICINE | Facility: CLINIC | Age: 65
End: 2020-02-21
Payer: MEDICARE

## 2020-02-21 ENCOUNTER — TELEPHONE (OUTPATIENT)
Dept: INTERNAL MEDICINE | Facility: CLINIC | Age: 65
End: 2020-02-21

## 2020-02-21 VITALS
RESPIRATION RATE: 20 BRPM | BODY MASS INDEX: 33.11 KG/M2 | HEART RATE: 85 BPM | HEIGHT: 65 IN | SYSTOLIC BLOOD PRESSURE: 132 MMHG | OXYGEN SATURATION: 98 % | TEMPERATURE: 98 F | DIASTOLIC BLOOD PRESSURE: 88 MMHG | WEIGHT: 198.75 LBS

## 2020-02-21 DIAGNOSIS — F51.04 PSYCHOPHYSIOLOGICAL INSOMNIA: ICD-10-CM

## 2020-02-21 DIAGNOSIS — I50.22 CHRONIC SYSTOLIC CONGESTIVE HEART FAILURE: ICD-10-CM

## 2020-02-21 DIAGNOSIS — G47.33 OSA ON CPAP: ICD-10-CM

## 2020-02-21 DIAGNOSIS — N52.9 ERECTILE DYSFUNCTION, UNSPECIFIED ERECTILE DYSFUNCTION TYPE: ICD-10-CM

## 2020-02-21 DIAGNOSIS — I15.2 HYPERTENSION ASSOCIATED WITH DIABETES: ICD-10-CM

## 2020-02-21 DIAGNOSIS — M51.26 HNP (HERNIATED NUCLEUS PULPOSUS), LUMBAR: ICD-10-CM

## 2020-02-21 DIAGNOSIS — E11.59 HYPERTENSION ASSOCIATED WITH DIABETES: ICD-10-CM

## 2020-02-21 DIAGNOSIS — M54.32 LEFT SIDED SCIATICA: ICD-10-CM

## 2020-02-21 DIAGNOSIS — N40.1 BENIGN PROSTATIC HYPERPLASIA WITH URINARY HESITANCY: ICD-10-CM

## 2020-02-21 DIAGNOSIS — R39.11 BENIGN PROSTATIC HYPERPLASIA WITH URINARY HESITANCY: ICD-10-CM

## 2020-02-21 DIAGNOSIS — Z91.89 AT RISK FOR MEDICATION NONCOMPLIANCE: ICD-10-CM

## 2020-02-21 DIAGNOSIS — M47.26 OSTEOARTHRITIS OF SPINE WITH RADICULOPATHY, LUMBAR REGION: Primary | ICD-10-CM

## 2020-02-21 DIAGNOSIS — K21.9 GASTROESOPHAGEAL REFLUX DISEASE WITHOUT ESOPHAGITIS: ICD-10-CM

## 2020-02-21 DIAGNOSIS — I25.118 CORONARY ARTERY DISEASE OF NATIVE ARTERY OF NATIVE HEART WITH STABLE ANGINA PECTORIS: ICD-10-CM

## 2020-02-21 DIAGNOSIS — I48.0 PAF (PAROXYSMAL ATRIAL FIBRILLATION): ICD-10-CM

## 2020-02-21 PROBLEM — I77.9 DISORDER OF ARTERIES AND ARTERIOLES, UNSPECIFIED: Status: RESOLVED | Noted: 2019-11-01 | Resolved: 2020-02-21

## 2020-02-21 PROBLEM — I48.91 ATRIAL FIBRILLATION WITH RVR: Status: RESOLVED | Noted: 2019-08-07 | Resolved: 2020-02-21

## 2020-02-21 PROBLEM — R79.89 ELEVATED TROPONIN: Status: RESOLVED | Noted: 2017-07-12 | Resolved: 2020-02-21

## 2020-02-21 PROCEDURE — 3075F SYST BP GE 130 - 139MM HG: CPT | Mod: HCNC,CPTII,S$GLB, | Performed by: FAMILY MEDICINE

## 2020-02-21 PROCEDURE — 99215 PR OFFICE/OUTPT VISIT, EST, LEVL V, 40-54 MIN: ICD-10-PCS | Mod: HCNC,S$GLB,, | Performed by: FAMILY MEDICINE

## 2020-02-21 PROCEDURE — 99215 OFFICE O/P EST HI 40 MIN: CPT | Mod: HCNC,S$GLB,, | Performed by: FAMILY MEDICINE

## 2020-02-21 PROCEDURE — 3079F DIAST BP 80-89 MM HG: CPT | Mod: HCNC,CPTII,S$GLB, | Performed by: FAMILY MEDICINE

## 2020-02-21 PROCEDURE — 3075F PR MOST RECENT SYSTOLIC BLOOD PRESS GE 130-139MM HG: ICD-10-PCS | Mod: HCNC,CPTII,S$GLB, | Performed by: FAMILY MEDICINE

## 2020-02-21 PROCEDURE — 3008F BODY MASS INDEX DOCD: CPT | Mod: HCNC,CPTII,S$GLB, | Performed by: FAMILY MEDICINE

## 2020-02-21 PROCEDURE — 3079F PR MOST RECENT DIASTOLIC BLOOD PRESSURE 80-89 MM HG: ICD-10-PCS | Mod: HCNC,CPTII,S$GLB, | Performed by: FAMILY MEDICINE

## 2020-02-21 PROCEDURE — 99499 UNLISTED E&M SERVICE: CPT | Mod: HCNC,S$GLB,, | Performed by: FAMILY MEDICINE

## 2020-02-21 PROCEDURE — 99999 PR PBB SHADOW E&M-EST. PATIENT-LVL V: CPT | Mod: PBBFAC,HCNC,, | Performed by: FAMILY MEDICINE

## 2020-02-21 PROCEDURE — 3051F HG A1C>EQUAL 7.0%<8.0%: CPT | Mod: HCNC,CPTII,S$GLB, | Performed by: FAMILY MEDICINE

## 2020-02-21 PROCEDURE — 99499 RISK ADDL DX/OHS AUDIT: ICD-10-PCS | Mod: HCNC,S$GLB,, | Performed by: FAMILY MEDICINE

## 2020-02-21 PROCEDURE — 3008F PR BODY MASS INDEX (BMI) DOCUMENTED: ICD-10-PCS | Mod: HCNC,CPTII,S$GLB, | Performed by: FAMILY MEDICINE

## 2020-02-21 PROCEDURE — 99999 PR PBB SHADOW E&M-EST. PATIENT-LVL V: ICD-10-PCS | Mod: PBBFAC,HCNC,, | Performed by: FAMILY MEDICINE

## 2020-02-21 PROCEDURE — 3051F PR MOST RECENT HEMOGLOBIN A1C LEVEL 7.0 - < 8.0%: ICD-10-PCS | Mod: HCNC,CPTII,S$GLB, | Performed by: FAMILY MEDICINE

## 2020-02-21 RX ORDER — TAMSULOSIN HYDROCHLORIDE 0.4 MG/1
0.4 CAPSULE ORAL DAILY
Qty: 30 CAPSULE | Refills: 11 | Status: SHIPPED | OUTPATIENT
Start: 2020-02-21 | End: 2021-02-24 | Stop reason: SDUPTHER

## 2020-02-21 NOTE — LETTER
February 21, 2020    Crow Green  [address]  [place] [address]         [facility]  [address] [place] [address]  [place] [address]  Phone: [phone]  Fax: [phone] February 21, 2020     Patient: Crow Green   YOB: 1955   Date of Visit: 2/21/2020       To Whom It May Concern:    It is my medical opinion that Crow Green has sleep apnea. He is compliant with his medication and CPAP but sometimes struggles with staying awake. Please know he is doing his best..    If you have any questions or concerns, please don't hesitate to call.    Sincerely,        Mateo Lambert, DO

## 2020-02-21 NOTE — PROGRESS NOTES
Subjective:       Patient ID: Crow Green is a 64 y.o. male.    Chief Complaint: Anxiety and Leg Pain    HPI     People at work are getting to him. Feels like he is slipping and going backwards. He has a lot of anxiety. He says not much gets into his mind because he watches T.V. Does not like to talk about it too much.    Patient is also having foot and leg pain for which he is taking gabapentin. He is looking for relief. His pain is worst at night. Patient is having needle and burning pain in legs. This has been happening for 7-8 years. This is when he found he was having a diabetic. He says that the leg pain starts at the left gluteal muscle and radiates down his thigh and leg. This causes him the most discomfort. Says he has received corticosteroid shots in the past with relief but has lost touch with those doctors.    Patient is not able to have sex because of erectile dysfunction. Feels like he wants to have a relationship. No morning erections. This has been going on for close to 5 years. States that there was a plan for him to see a doctor for his erectile dysfunction but that it fell through. This is one of his main concerns today at the visit.    Patient has congestive heart failure which he is having trouble breathing. He states that he is having surgery for this coming up. From the patients chart the patient is going to undergo ablation for atrial fibrillation on 2/27/2020.    Patient takes metformin and does not like it. A couple days ago his highest glucose levels were 190 and lowest was in the 90s. He needs glucose strips.    Patient has sleep apnea for which he needs a note for his boss for the days he dozes off. Uses CPAP machine.    He does not have his medications at this visit. He is willing to bring his medications to his next appointment in a visit at [facility].    Patient fell on Monday or Tuesday. He started throwing up. Says he has not been experiencing those symptoms since  "then. Unsure of the cause.        Review of Systems   Constitutional: Positive for appetite change (eating 1x per day) and diaphoresis (woke up sweating). Negative for chills and fever.   HENT: Negative for sneezing and sore throat.    Respiratory: Negative for cough.    Cardiovascular: Negative for chest pain and leg swelling.   Gastrointestinal: Positive for constipation (has not gone to bathroom in 2 days). Negative for diarrhea.   Endocrine: Negative for polydipsia, polyphagia and polyuria.   Genitourinary: Positive for difficulty urinating (felt like he had to go to the bathroom today but didnt come out; eventually he was able to go). Negative for dysuria.   Musculoskeletal: Positive for back pain. Negative for gait problem.   Allergic/Immunologic: Negative.    Neurological: Positive for light-headedness and headaches.   Psychiatric/Behavioral: Positive for agitation, sleep disturbance and suicidal ideas (has a lot to do for his grandkids and great grandkids).       Objective:       Vitals:    02/21/20 1316   BP: 132/88   BP Location: Right arm   Patient Position: Sitting   BP Method: X-Large (Manual)   Pulse: 85   Resp: 20   Temp: 97.7 °F (36.5 °C)   TempSrc: Temporal   SpO2: 98%   Weight: 90.2 kg (198 lb 11.9 oz)   Height: 5' 5" (1.651 m)     Physical Exam   Constitutional: He is oriented to person, place, and time. No distress.   HENT:   Head: Normocephalic.   Right Ear: External ear normal.   Left Ear: External ear normal.   Eyes: Conjunctivae are normal. Right eye exhibits no discharge. Left eye exhibits no discharge.   Cardiovascular: Normal rate and normal heart sounds.   No murmur heard.  Pulmonary/Chest: Effort normal and breath sounds normal. No stridor. No respiratory distress. He has no wheezes. He has no rales.   Abdominal: Soft. He exhibits no distension. There is no tenderness.   Musculoskeletal: Normal range of motion.        Right foot: There is no deformity.        Left foot: There is no " deformity.   Feet:   Right Foot:   Protective Sensation: 4 sites tested. 4 sites sensed.   Skin Integrity: Positive for dry skin. Negative for ulcer, blister or erythema.   Left Foot:   Protective Sensation: 4 sites tested. 2 sites sensed.   Skin Integrity: Positive for dry skin. Negative for ulcer, blister or erythema.   Neurological: He is alert and oriented to person, place, and time.   Skin: Skin is warm and dry. He is not diaphoretic.   Psychiatric: Judgment and thought content normal. He expresses no suicidal (states he has had suicidal ideation but has too much to live for) ideation. He expresses no suicidal plans.   Vitals reviewed.      Assessment:       Health maintenence  Back pain from sciatic nerve pain  Erectile dysfunction  GERD  Type 2 diabetes complicated by neuropathy  Atrial fibrillation  Sleep apnea   BPH     Plan:       Health maintenence  · Patient needs to get home health assistance to help organize his medications.  · Will have patient follow-up soon     Back pain from sciatic nerve impingement  · Patient will be scheduled to see pain medicine specialist  · Patients pain most consistent with a sciatic nerve impingement  · Considering switch the patients psych medications to one that has a more favorable profile for neuropathic pain such as Cymbalta.    Erectile dysfunction  · Cannot put patient on Cialis or Viagra because he has been prescribed a nitrate.  · Patient unsure if he takes the nitrate.  · Need outpatient medications organized to see if this is a problem we can help with.    GERD  · Patient started experiencing reflux symptoms.  · Started taking his pantoprazole (protonix) 40 mg oral po daily again this morning    Diabetes  · Patient having side effects from his metformin 1000 mg PO 2x daily  · Switched the patient metformin dosage to 500 mg PO one times daily at this visit.  · Increased the dosage of the patients Semaglutide (OZEMPIC) medication to 1 mg intradermal  weekly  · Performed diabetic foot exam at this visit. Patient has some diabetic foot neuropathy in left foot based on monofilament testing.  · Ordered blood glucose strips for patient    Atrial fibrillation  · Patient being followed by cardiology  · Ablation of atrial fibrillation scheduled on 2/27/2020    Sleep apnea  · Patient says he is using CPAP machine  · Provided patient note for work to explain his medical condition and why he is tired.    BPH  · Patient has described difficulty voiding while urinating  · Prescribed Tamsulosin at this office visit to help relax prostate and making urinating easier.

## 2020-02-21 NOTE — TELEPHONE ENCOUNTER
----- Message from Dejan Jackson sent at 2/21/2020 10:33 AM CST -----  Contact: Self  Pt calling in regards to his appt he have on this morning that he is late for due to transportation issues and needs nurse to contact him please to know if he still can come in at a later time on today. He don't want to reschedule his appt      Please advise pt can be contact at  [phone]

## 2020-02-24 ENCOUNTER — OUTPATIENT CASE MANAGEMENT (OUTPATIENT)
Dept: ADMINISTRATIVE | Facility: OTHER | Age: 65
End: 2020-02-24

## 2020-02-24 NOTE — LETTER
February 26, 2020    Crow FISCHER Norma  [address]  [facility] [address]             [facility] [address] [place] [address] Dear Mr. Green,     I work with [facility]'s Outpatient Case Management Department. We received a referral to call you to discuss your medical history.These services are free of charge and are offered to [facility] patients who have recently been discharged from any of our facilities or who have complex medical conditions that may require the skill of a nurse to assist with management.             I am a Registered Nurse who specializes in connecting patients with available medical and financial resources as well as addressing any educational needs that may be indicated.      I attempted to reach you by telephone, but I was unsuccessful. Please call our department so that we can go over some questions with you regarding your health.    The Outpatient Case Management Department can be reached at [phone] from 8:00AM to 4:30 PM on Monday thru Friday. [facility] also has a program where a nurse is available 24/7 to answer questions or provide medical advice, their number is [phone].    Thanks,      Ermelinda Todd, RN  Outpatient Complex Case Management/Disease Management   [phone]

## 2020-02-26 ENCOUNTER — TELEPHONE (OUTPATIENT)
Dept: ELECTROPHYSIOLOGY | Facility: CLINIC | Age: 65
End: 2020-02-26

## 2020-02-26 ENCOUNTER — PATIENT OUTREACH (OUTPATIENT)
Dept: ADMINISTRATIVE | Facility: OTHER | Age: 65
End: 2020-02-26

## 2020-02-26 NOTE — PROGRESS NOTES
2/26/20  Outpatient Care Management  Patient Does Not Consent    Patient: Crow Green  MRN:  [id number]  Date of Service:  2/26/2020  Completed by:  Ermelinda Todd RN    Chief Complaint   Patient presents with    OPCM Chart Review     2/24/20    OPCM RN First Assessment Attempt     2/26/20    Case Closure     2/26/20       Patient Summary     Program:  OPCM High Risk  Qualification Status:  Yes  Consent Received:  Decline  Decline Reason:  Not interested      Started to complete initial assessment for patient. After patient answered some of the questions, patient asked to stop assessment and states that he was getting frustrated with having to answer all of the questions. Attempted to reschedule assessment at a later time and patient declined OPCM program at this time. Letter mailed with OPCM contact information and instructed patient to contact OPCM if he changes his mind. Case closed.    Ermelinda Todd RN  Outpatient

## 2020-02-26 NOTE — TELEPHONE ENCOUNTER
Spoke to Mr. Green    CONFIRMED procedure arrival time of 9AM  Reiterated instructions including:  -Directions to check in desk  -NPO after midnight night prior to procedure  -High importance of HOLDING Eliquis, Glimeperide, Insulin, Metformin, Ozempic day of procedure   -Pre-procedure LABS 2/17, checked  -Confirmed no recent fever, bleeding, infection or skin rash in the past 30 days     Pt verbalizes understanding of above and appreciates call.

## 2020-02-27 ENCOUNTER — HOSPITAL ENCOUNTER (OUTPATIENT)
Dept: CARDIOLOGY | Facility: CLINIC | Age: 65
Discharge: HOME | End: 2020-02-27
Attending: INTERNAL MEDICINE | Admitting: INTERNAL MEDICINE
Payer: MEDICARE

## 2020-02-27 ENCOUNTER — ANESTHESIA (OUTPATIENT)
Dept: MEDSURG UNIT | Facility: HOSPITAL | Age: 65
End: 2020-02-27
Payer: MEDICARE

## 2020-02-27 ENCOUNTER — HOSPITAL ENCOUNTER (OUTPATIENT)
Facility: HOSPITAL | Age: 65
Discharge: HOME | End: 2020-02-28
Attending: INTERNAL MEDICINE | Admitting: INTERNAL MEDICINE
Payer: MEDICARE

## 2020-02-27 ENCOUNTER — ANESTHESIA EVENT (OUTPATIENT)
Dept: MEDSURG UNIT | Facility: HOSPITAL | Age: 65
End: 2020-02-27
Payer: MEDICARE

## 2020-02-27 VITALS
BODY MASS INDEX: 32.99 KG/M2 | SYSTOLIC BLOOD PRESSURE: 130 MMHG | WEIGHT: 198 LBS | HEIGHT: 65 IN | DIASTOLIC BLOOD PRESSURE: 76 MMHG

## 2020-02-27 DIAGNOSIS — I48.91 ATRIAL FIBRILLATION WITH RVR: ICD-10-CM

## 2020-02-27 DIAGNOSIS — Z01.89 ENCOUNTER FOR OTHER SPECIFIED SPECIAL EXAMINATIONS: ICD-10-CM

## 2020-02-27 DIAGNOSIS — I48.91 ATRIAL FIBRILLATION: ICD-10-CM

## 2020-02-27 DIAGNOSIS — I48.91 A-FIB: ICD-10-CM

## 2020-02-27 LAB
ABO + RH BLD: NORMAL
BLD GP AB SCN CELLS X3 SERPL QL: NORMAL
BSA FOR ECHO PROCEDURE: 2.03 M2
POCT GLUCOSE: 102 MG/DL (ref 70–110)
POCT GLUCOSE: 161 MG/DL (ref 70–110)
RIGHT VENTRICULAR END-DIASTOLIC DIMENSION: 4 CM

## 2020-02-27 PROCEDURE — 93312 ECHO TRANSESOPHAGEAL: CPT | Mod: 26,HCNC,, | Performed by: INTERNAL MEDICINE

## 2020-02-27 PROCEDURE — 25000003 PHARM REV CODE 250: Mod: HCNC | Performed by: STUDENT IN AN ORGANIZED HEALTH CARE EDUCATION/TRAINING PROGRAM

## 2020-02-27 PROCEDURE — 25000003 PHARM REV CODE 250: Mod: HCNC | Performed by: NURSE ANESTHETIST, CERTIFIED REGISTERED

## 2020-02-27 PROCEDURE — 93010 ELECTROCARDIOGRAM REPORT: CPT | Mod: HCNC,,, | Performed by: INTERNAL MEDICINE

## 2020-02-27 PROCEDURE — 27201423 OPTIME MED/SURG SUP & DEVICES STERILE SUPPLY: Mod: HCNC | Performed by: INTERNAL MEDICINE

## 2020-02-27 PROCEDURE — 93325 TRANSESOPHAGEAL ECHO (TEE) (CUPID ONLY): ICD-10-PCS | Mod: 26,HCNC,, | Performed by: INTERNAL MEDICINE

## 2020-02-27 PROCEDURE — 37000008 HC ANESTHESIA 1ST 15 MINUTES: Performed by: ANESTHESIOLOGY

## 2020-02-27 PROCEDURE — C2630 CATH, EP, COOL-TIP: HCPCS | Mod: HCNC | Performed by: INTERNAL MEDICINE

## 2020-02-27 PROCEDURE — 94002 VENT MGMT INPAT INIT DAY: CPT | Mod: HCNC,59

## 2020-02-27 PROCEDURE — 00537 ANES CARDIAC EP PROCEDURES: CPT | Mod: HCNC | Performed by: INTERNAL MEDICINE

## 2020-02-27 PROCEDURE — 93662 INTRACARDIAC ECG (ICE): CPT | Mod: 26,HCNC,, | Performed by: INTERNAL MEDICINE

## 2020-02-27 PROCEDURE — 93005 ELECTROCARDIOGRAM TRACING: CPT | Mod: HCNC

## 2020-02-27 PROCEDURE — D9220A PRA ANESTHESIA: ICD-10-PCS | Mod: HCNC,CRNA,, | Performed by: NURSE ANESTHETIST, CERTIFIED REGISTERED

## 2020-02-27 PROCEDURE — 93613 INTRACARDIAC EPHYS 3D MAPG: CPT | Mod: HCNC,,, | Performed by: INTERNAL MEDICINE

## 2020-02-27 PROCEDURE — 93010 EKG 12-LEAD: ICD-10-PCS | Mod: HCNC,,, | Performed by: INTERNAL MEDICINE

## 2020-02-27 PROCEDURE — 37000009 HC ANESTHESIA EA ADD 15 MINS: Mod: HCNC | Performed by: INTERNAL MEDICINE

## 2020-02-27 PROCEDURE — 94761 N-INVAS EAR/PLS OXIMETRY MLT: CPT | Mod: HCNC

## 2020-02-27 PROCEDURE — 93662 INTRACARDIAC ECG (ICE): CPT | Mod: HCNC | Performed by: INTERNAL MEDICINE

## 2020-02-27 PROCEDURE — 25000003 PHARM REV CODE 250: Mod: HCNC | Performed by: INTERNAL MEDICINE

## 2020-02-27 PROCEDURE — C1730 CATH, EP, 19 OR FEW ELECT: HCPCS | Mod: HCNC | Performed by: INTERNAL MEDICINE

## 2020-02-27 PROCEDURE — 27800505 HC CATH, RADIAL ARTERY KIT: Mod: HCNC | Performed by: ANESTHESIOLOGY

## 2020-02-27 PROCEDURE — 93662 PR INTRACARD ECHO, THER/DX INTERVENT: ICD-10-PCS | Mod: 26,HCNC,, | Performed by: INTERNAL MEDICINE

## 2020-02-27 PROCEDURE — 27201037 HC PRESSURE MONITORING SET UP: Mod: HCNC

## 2020-02-27 PROCEDURE — 93010 ELECTROCARDIOGRAM REPORT: CPT | Mod: 77,HCNC,, | Performed by: INTERNAL MEDICINE

## 2020-02-27 PROCEDURE — 99900035 HC TECH TIME PER 15 MIN (STAT): Mod: HCNC

## 2020-02-27 PROCEDURE — 93656 COMPRE EP EVAL ABLTJ ATR FIB: CPT | Mod: HCNC,,, | Performed by: INTERNAL MEDICINE

## 2020-02-27 PROCEDURE — 36620 PR INSERT CATH,ART,PERCUT,SHORTTERM: ICD-10-PCS | Mod: 59,HCNC,, | Performed by: ANESTHESIOLOGY

## 2020-02-27 PROCEDURE — 63600175 PHARM REV CODE 636 W HCPCS: Mod: HCNC | Performed by: NURSE ANESTHETIST, CERTIFIED REGISTERED

## 2020-02-27 PROCEDURE — 93613 INTRACARDIAC EPHYS 3D MAPG: CPT | Mod: HCNC | Performed by: INTERNAL MEDICINE

## 2020-02-27 PROCEDURE — C1753 CATH, INTRAVAS ULTRASOUND: HCPCS | Mod: HCNC | Performed by: INTERNAL MEDICINE

## 2020-02-27 PROCEDURE — 93010 EKG 12-LEAD: ICD-10-PCS | Mod: 77,HCNC,, | Performed by: INTERNAL MEDICINE

## 2020-02-27 PROCEDURE — 93320 DOPPLER ECHO COMPLETE: CPT | Mod: 26,HCNC,, | Performed by: INTERNAL MEDICINE

## 2020-02-27 PROCEDURE — 01922 ANES N-INVAS IMG/RADJ THER: CPT | Performed by: ANESTHESIOLOGY

## 2020-02-27 PROCEDURE — 93325 DOPPLER ECHO COLOR FLOW MAPG: CPT | Mod: HCNC

## 2020-02-27 PROCEDURE — 93320 TRANSESOPHAGEAL ECHO (TEE) (CUPID ONLY): ICD-10-PCS | Mod: 26,HCNC,, | Performed by: INTERNAL MEDICINE

## 2020-02-27 PROCEDURE — 93325 DOPPLER ECHO COLOR FLOW MAPG: CPT | Mod: 26,HCNC,, | Performed by: INTERNAL MEDICINE

## 2020-02-27 PROCEDURE — 37000009 HC ANESTHESIA EA ADD 15 MINS: Performed by: ANESTHESIOLOGY

## 2020-02-27 PROCEDURE — 93656 COMPRE EP EVAL ABLTJ ATR FIB: CPT | Mod: HCNC | Performed by: INTERNAL MEDICINE

## 2020-02-27 PROCEDURE — 82962 GLUCOSE BLOOD TEST: CPT | Mod: HCNC,91 | Performed by: INTERNAL MEDICINE

## 2020-02-27 PROCEDURE — C1894 INTRO/SHEATH, NON-LASER: HCPCS | Mod: HCNC | Performed by: INTERNAL MEDICINE

## 2020-02-27 PROCEDURE — 93312 TRANSESOPHAGEAL ECHO (TEE) (CUPID ONLY): ICD-10-PCS | Mod: 26,HCNC,, | Performed by: INTERNAL MEDICINE

## 2020-02-27 PROCEDURE — 36620 INSERTION CATHETER ARTERY: CPT | Mod: 59,HCNC,, | Performed by: ANESTHESIOLOGY

## 2020-02-27 PROCEDURE — 63600175 PHARM REV CODE 636 W HCPCS: Mod: HCNC | Performed by: STUDENT IN AN ORGANIZED HEALTH CARE EDUCATION/TRAINING PROGRAM

## 2020-02-27 PROCEDURE — 63600175 PHARM REV CODE 636 W HCPCS: Mod: HCNC | Performed by: NURSE PRACTITIONER

## 2020-02-27 PROCEDURE — 82962 GLUCOSE BLOOD TEST: CPT | Mod: HCNC

## 2020-02-27 PROCEDURE — D9220A PRA ANESTHESIA: ICD-10-PCS | Mod: HCNC,ANES,, | Performed by: ANESTHESIOLOGY

## 2020-02-27 PROCEDURE — D9220A PRA ANESTHESIA: Mod: HCNC,ANES,, | Performed by: ANESTHESIOLOGY

## 2020-02-27 PROCEDURE — 93613 PR INTRACARD ELECTROPHYS 3-DIMENS MAPPING: ICD-10-PCS | Mod: HCNC,,, | Performed by: INTERNAL MEDICINE

## 2020-02-27 PROCEDURE — 93656 PR ELECTROPHYS EVAL, COMPREHEN, W/ABLATION OF ATRIAL FIBR: ICD-10-PCS | Mod: HCNC,,, | Performed by: INTERNAL MEDICINE

## 2020-02-27 PROCEDURE — 37000008 HC ANESTHESIA 1ST 15 MINUTES: Mod: HCNC | Performed by: INTERNAL MEDICINE

## 2020-02-27 PROCEDURE — D9220A PRA ANESTHESIA: Mod: HCNC,CRNA,, | Performed by: NURSE ANESTHETIST, CERTIFIED REGISTERED

## 2020-02-27 PROCEDURE — 86901 BLOOD TYPING SEROLOGIC RH(D): CPT | Mod: HCNC

## 2020-02-27 PROCEDURE — C1766 INTRO/SHEATH,STRBLE,NON-PEEL: HCPCS | Mod: HCNC | Performed by: INTERNAL MEDICINE

## 2020-02-27 RX ORDER — IBUPROFEN 600 MG/1
600 TABLET ORAL EVERY 6 HOURS PRN
Status: DISCONTINUED | OUTPATIENT
Start: 2020-02-27 | End: 2020-02-28 | Stop reason: HOSPADM

## 2020-02-27 RX ORDER — DILTIAZEM HYDROCHLORIDE 30 MG/1
60 TABLET, FILM COATED ORAL EVERY 12 HOURS
Status: DISCONTINUED | OUTPATIENT
Start: 2020-02-27 | End: 2020-02-28 | Stop reason: HOSPADM

## 2020-02-27 RX ORDER — AZATHIOPRINE 50 MG/1
50 TABLET ORAL 2 TIMES DAILY
Status: DISCONTINUED | OUTPATIENT
Start: 2020-02-27 | End: 2020-02-28 | Stop reason: HOSPADM

## 2020-02-27 RX ORDER — SODIUM CHLORIDE 0.9 % (FLUSH) 0.9 %
5 SYRINGE (ML) INJECTION
Status: DISCONTINUED | OUTPATIENT
Start: 2020-02-27 | End: 2020-02-27 | Stop reason: HOSPADM

## 2020-02-27 RX ORDER — PROPOFOL 10 MG/ML
50 INJECTION, EMULSION INTRAVENOUS CONTINUOUS
Status: DISCONTINUED | OUTPATIENT
Start: 2020-02-27 | End: 2020-02-27

## 2020-02-27 RX ORDER — EPHEDRINE SULFATE 50 MG/ML
INJECTION, SOLUTION INTRAVENOUS
Status: DISCONTINUED | OUTPATIENT
Start: 2020-02-27 | End: 2020-02-27

## 2020-02-27 RX ORDER — INSULIN ASPART 100 [IU]/ML
1-10 INJECTION, SOLUTION INTRAVENOUS; SUBCUTANEOUS
Status: DISCONTINUED | OUTPATIENT
Start: 2020-02-27 | End: 2020-02-28 | Stop reason: HOSPADM

## 2020-02-27 RX ORDER — GABAPENTIN 400 MG/1
800 CAPSULE ORAL 3 TIMES DAILY
Status: DISCONTINUED | OUTPATIENT
Start: 2020-02-27 | End: 2020-02-28 | Stop reason: HOSPADM

## 2020-02-27 RX ORDER — FUROSEMIDE 40 MG/1
40 TABLET ORAL DAILY
Status: DISCONTINUED | OUTPATIENT
Start: 2020-02-28 | End: 2020-02-28 | Stop reason: HOSPADM

## 2020-02-27 RX ORDER — DIPHENHYDRAMINE HYDROCHLORIDE 50 MG/ML
INJECTION INTRAMUSCULAR; INTRAVENOUS
Status: DISCONTINUED | OUTPATIENT
Start: 2020-02-27 | End: 2020-02-27

## 2020-02-27 RX ORDER — HEPARIN SODIUM 1000 [USP'U]/ML
INJECTION, SOLUTION INTRAVENOUS; SUBCUTANEOUS
Status: DISCONTINUED | OUTPATIENT
Start: 2020-02-27 | End: 2020-02-27

## 2020-02-27 RX ORDER — FENTANYL CITRATE 50 UG/ML
25 INJECTION, SOLUTION INTRAMUSCULAR; INTRAVENOUS EVERY 5 MIN PRN
Status: DISCONTINUED | OUTPATIENT
Start: 2020-02-27 | End: 2020-02-28 | Stop reason: HOSPADM

## 2020-02-27 RX ORDER — SODIUM CHLORIDE 9 MG/ML
INJECTION, SOLUTION INTRAVENOUS CONTINUOUS
Status: DISCONTINUED | OUTPATIENT
Start: 2020-02-27 | End: 2020-02-27

## 2020-02-27 RX ORDER — IBUPROFEN 200 MG
24 TABLET ORAL
Status: DISCONTINUED | OUTPATIENT
Start: 2020-02-27 | End: 2020-02-28 | Stop reason: HOSPADM

## 2020-02-27 RX ORDER — PANTOPRAZOLE SODIUM 40 MG/1
40 TABLET, DELAYED RELEASE ORAL DAILY
Status: DISCONTINUED | OUTPATIENT
Start: 2020-02-28 | End: 2020-02-28 | Stop reason: HOSPADM

## 2020-02-27 RX ORDER — METOPROLOL SUCCINATE 50 MG/1
50 TABLET, EXTENDED RELEASE ORAL DAILY
Status: DISCONTINUED | OUTPATIENT
Start: 2020-02-28 | End: 2020-02-28 | Stop reason: HOSPADM

## 2020-02-27 RX ORDER — IBUPROFEN 200 MG
16 TABLET ORAL
Status: DISCONTINUED | OUTPATIENT
Start: 2020-02-27 | End: 2020-02-28 | Stop reason: HOSPADM

## 2020-02-27 RX ORDER — MIDAZOLAM HYDROCHLORIDE 1 MG/ML
INJECTION, SOLUTION INTRAMUSCULAR; INTRAVENOUS
Status: DISCONTINUED | OUTPATIENT
Start: 2020-02-27 | End: 2020-02-27

## 2020-02-27 RX ORDER — FUROSEMIDE 10 MG/ML
INJECTION INTRAMUSCULAR; INTRAVENOUS
Status: DISCONTINUED | OUTPATIENT
Start: 2020-02-27 | End: 2020-02-27

## 2020-02-27 RX ORDER — HYDROMORPHONE HYDROCHLORIDE 1 MG/ML
0.2 INJECTION, SOLUTION INTRAMUSCULAR; INTRAVENOUS; SUBCUTANEOUS EVERY 5 MIN PRN
Status: DISCONTINUED | OUTPATIENT
Start: 2020-02-27 | End: 2020-02-28 | Stop reason: HOSPADM

## 2020-02-27 RX ORDER — ACETAMINOPHEN 325 MG/1
650 TABLET ORAL EVERY 6 HOURS PRN
Status: DISCONTINUED | OUTPATIENT
Start: 2020-02-27 | End: 2020-02-28 | Stop reason: HOSPADM

## 2020-02-27 RX ORDER — ASPIRIN 81 MG/1
81 TABLET ORAL DAILY
Status: DISCONTINUED | OUTPATIENT
Start: 2020-02-28 | End: 2020-02-28 | Stop reason: HOSPADM

## 2020-02-27 RX ORDER — ONDANSETRON 2 MG/ML
INJECTION INTRAMUSCULAR; INTRAVENOUS
Status: DISCONTINUED | OUTPATIENT
Start: 2020-02-27 | End: 2020-02-27

## 2020-02-27 RX ORDER — GLUCAGON 1 MG
1 KIT INJECTION
Status: DISCONTINUED | OUTPATIENT
Start: 2020-02-27 | End: 2020-02-28 | Stop reason: HOSPADM

## 2020-02-27 RX ORDER — ATORVASTATIN CALCIUM 20 MG/1
40 TABLET, FILM COATED ORAL NIGHTLY
Status: DISCONTINUED | OUTPATIENT
Start: 2020-02-27 | End: 2020-02-28 | Stop reason: HOSPADM

## 2020-02-27 RX ORDER — LIDOCAINE HYDROCHLORIDE 20 MG/ML
INJECTION INTRAVENOUS
Status: DISCONTINUED | OUTPATIENT
Start: 2020-02-27 | End: 2020-02-27

## 2020-02-27 RX ORDER — DIPHENHYDRAMINE HYDROCHLORIDE 50 MG/ML
25 INJECTION INTRAMUSCULAR; INTRAVENOUS EVERY 6 HOURS PRN
Status: DISCONTINUED | OUTPATIENT
Start: 2020-02-27 | End: 2020-02-28 | Stop reason: HOSPADM

## 2020-02-27 RX ORDER — LISINOPRIL 10 MG/1
10 TABLET ORAL DAILY
Status: DISCONTINUED | OUTPATIENT
Start: 2020-02-28 | End: 2020-02-28 | Stop reason: HOSPADM

## 2020-02-27 RX ORDER — FENTANYL CITRATE 50 UG/ML
INJECTION, SOLUTION INTRAMUSCULAR; INTRAVENOUS
Status: DISCONTINUED | OUTPATIENT
Start: 2020-02-27 | End: 2020-02-27

## 2020-02-27 RX ORDER — PROPOFOL 10 MG/ML
VIAL (ML) INTRAVENOUS
Status: DISCONTINUED | OUTPATIENT
Start: 2020-02-27 | End: 2020-02-27

## 2020-02-27 RX ORDER — LIDOCAINE HYDROCHLORIDE 20 MG/ML
INJECTION, SOLUTION EPIDURAL; INFILTRATION; INTRACAUDAL; PERINEURAL
Status: DISCONTINUED | OUTPATIENT
Start: 2020-02-27 | End: 2020-02-27

## 2020-02-27 RX ORDER — ONDANSETRON 2 MG/ML
4 INJECTION INTRAMUSCULAR; INTRAVENOUS ONCE AS NEEDED
Status: DISCONTINUED | OUTPATIENT
Start: 2020-02-27 | End: 2020-02-28 | Stop reason: HOSPADM

## 2020-02-27 RX ORDER — BACLOFEN 10 MG/1
10 TABLET ORAL DAILY
Status: DISCONTINUED | OUTPATIENT
Start: 2020-02-28 | End: 2020-02-28 | Stop reason: HOSPADM

## 2020-02-27 RX ORDER — PROPOFOL 10 MG/ML
VIAL (ML) INTRAVENOUS CONTINUOUS PRN
Status: DISCONTINUED | OUTPATIENT
Start: 2020-02-27 | End: 2020-02-27

## 2020-02-27 RX ORDER — DILTIAZEM HYDROCHLORIDE 60 MG/1
60 CAPSULE, EXTENDED RELEASE ORAL 2 TIMES DAILY
Status: DISCONTINUED | OUTPATIENT
Start: 2020-02-27 | End: 2020-02-27

## 2020-02-27 RX ORDER — SUCCINYLCHOLINE CHLORIDE 20 MG/ML
INJECTION INTRAMUSCULAR; INTRAVENOUS
Status: DISCONTINUED | OUTPATIENT
Start: 2020-02-27 | End: 2020-02-27

## 2020-02-27 RX ORDER — PHENYLEPHRINE HYDROCHLORIDE 10 MG/ML
INJECTION INTRAVENOUS
Status: DISCONTINUED | OUTPATIENT
Start: 2020-02-27 | End: 2020-02-27

## 2020-02-27 RX ORDER — ROCURONIUM BROMIDE 10 MG/ML
INJECTION, SOLUTION INTRAVENOUS
Status: DISCONTINUED | OUTPATIENT
Start: 2020-02-27 | End: 2020-02-27

## 2020-02-27 RX ORDER — ISOSORBIDE MONONITRATE 60 MG/1
60 TABLET, EXTENDED RELEASE ORAL DAILY
Status: DISCONTINUED | OUTPATIENT
Start: 2020-02-28 | End: 2020-02-28 | Stop reason: HOSPADM

## 2020-02-27 RX ORDER — PROTAMINE SULFATE 10 MG/ML
INJECTION, SOLUTION INTRAVENOUS
Status: DISCONTINUED | OUTPATIENT
Start: 2020-02-27 | End: 2020-02-27

## 2020-02-27 RX ADMIN — PHENYLEPHRINE HYDROCHLORIDE 80 MCG: 10 INJECTION INTRAVENOUS at 12:02

## 2020-02-27 RX ADMIN — PROTAMINE SULFATE 10 MG: 10 INJECTION, SOLUTION INTRAVENOUS at 03:02

## 2020-02-27 RX ADMIN — ACETAMINOPHEN 650 MG: 325 TABLET ORAL at 11:02

## 2020-02-27 RX ADMIN — HEPARIN SODIUM 4000 UNITS: 1000 INJECTION INTRAVENOUS; SUBCUTANEOUS at 02:02

## 2020-02-27 RX ADMIN — TRAZODONE HYDROCHLORIDE 150 MG: 50 TABLET ORAL at 10:02

## 2020-02-27 RX ADMIN — MIDAZOLAM HYDROCHLORIDE 2 MG: 1 INJECTION, SOLUTION INTRAMUSCULAR; INTRAVENOUS at 12:02

## 2020-02-27 RX ADMIN — PROPOFOL 100 MG: 10 INJECTION, EMULSION INTRAVENOUS at 12:02

## 2020-02-27 RX ADMIN — DIPHENHYDRAMINE HYDROCHLORIDE 50 MG: 50 INJECTION, SOLUTION INTRAMUSCULAR; INTRAVENOUS at 03:02

## 2020-02-27 RX ADMIN — EPHEDRINE SULFATE 10 MG: 50 INJECTION, SOLUTION INTRAMUSCULAR; INTRAVENOUS; SUBCUTANEOUS at 01:02

## 2020-02-27 RX ADMIN — PROTAMINE SULFATE 50 MG: 10 INJECTION, SOLUTION INTRAVENOUS at 03:02

## 2020-02-27 RX ADMIN — GABAPENTIN 800 MG: 400 CAPSULE ORAL at 10:02

## 2020-02-27 RX ADMIN — AZATHIOPRINE 50 MG: 50 TABLET ORAL at 10:02

## 2020-02-27 RX ADMIN — SUCCINYLCHOLINE CHLORIDE 120 MG: 20 INJECTION, SOLUTION INTRAMUSCULAR; INTRAVENOUS at 12:02

## 2020-02-27 RX ADMIN — ROCURONIUM BROMIDE 5 MG: 10 INJECTION, SOLUTION INTRAVENOUS at 12:02

## 2020-02-27 RX ADMIN — SODIUM CHLORIDE, SODIUM GLUCONATE, SODIUM ACETATE, POTASSIUM CHLORIDE, MAGNESIUM CHLORIDE, SODIUM PHOSPHATE, DIBASIC, AND POTASSIUM PHOSPHATE: .53; .5; .37; .037; .03; .012; .00082 INJECTION, SOLUTION INTRAVENOUS at 12:02

## 2020-02-27 RX ADMIN — ATORVASTATIN CALCIUM 40 MG: 20 TABLET, FILM COATED ORAL at 10:02

## 2020-02-27 RX ADMIN — HEPARIN SODIUM 3000 UNITS: 1000 INJECTION INTRAVENOUS; SUBCUTANEOUS at 02:02

## 2020-02-27 RX ADMIN — ONDANSETRON 4 MG: 2 INJECTION INTRAMUSCULAR; INTRAVENOUS at 03:02

## 2020-02-27 RX ADMIN — SODIUM CHLORIDE: 0.9 INJECTION, SOLUTION INTRAVENOUS at 12:02

## 2020-02-27 RX ADMIN — LIDOCAINE HYDROCHLORIDE 40 MG: 20 INJECTION, SOLUTION INTRAVENOUS at 12:02

## 2020-02-27 RX ADMIN — DILTIAZEM HYDROCHLORIDE 60 MG: 30 TABLET, FILM COATED ORAL at 10:02

## 2020-02-27 RX ADMIN — INSULIN ASPART 3 UNITS: 100 INJECTION, SOLUTION INTRAVENOUS; SUBCUTANEOUS at 10:02

## 2020-02-27 RX ADMIN — HEPARIN SODIUM 12000 UNITS: 1000 INJECTION INTRAVENOUS; SUBCUTANEOUS at 01:02

## 2020-02-27 RX ADMIN — SODIUM CHLORIDE 0.25 MCG/KG/MIN: 9 INJECTION, SOLUTION INTRAVENOUS at 12:02

## 2020-02-27 RX ADMIN — HEPARIN SODIUM 2000 UNITS: 1000 INJECTION INTRAVENOUS; SUBCUTANEOUS at 02:02

## 2020-02-27 RX ADMIN — METHYLPREDNISOLONE SODIUM SUCCINATE 125 MG: 125 INJECTION, POWDER, FOR SOLUTION INTRAMUSCULAR; INTRAVENOUS at 03:02

## 2020-02-27 RX ADMIN — APIXABAN 5 MG: 5 TABLET, FILM COATED ORAL at 10:02

## 2020-02-27 RX ADMIN — PROPOFOL 200 MCG/KG/MIN: 10 INJECTION, EMULSION INTRAVENOUS at 03:02

## 2020-02-27 RX ADMIN — FUROSEMIDE 40 MG: 10 INJECTION, SOLUTION INTRAMUSCULAR; INTRAVENOUS at 03:02

## 2020-02-27 RX ADMIN — FENTANYL CITRATE 100 MCG: 50 INJECTION, SOLUTION INTRAMUSCULAR; INTRAVENOUS at 12:02

## 2020-02-27 NOTE — TRANSFER OF CARE
"Anesthesia Transfer of Care Note    Patient: Crow Green    Procedure(s) Performed: Procedure(s) (LRB):  Ablation atrial fibrillation (N/A)  Transesophageal echo (DAMIEN) intra-procedure log documentation (N/A)    Patient location: PACU    Anesthesia Type: general    Transport from OR: Transported from OR intubated on 100% O2 by AMBU with adequate controlled ventilation. Continuous ECG monitoring in transport. Continuous SpO2 monitoring in transport. Continuos invasive BP monitoring in transport    Post pain: adequate analgesia    Post assessment: tolerated procedure well (Patient developed an allergic reaction at the end of procedure. Patient remained intubated. No changes to blood pressure or heart rate. )    Post vital signs: stable    Level of consciousness: responds to stimulation    Nausea/Vomiting: no nausea/vomiting    Complications: none    Transfer of care protocol was followedComments: Patient transferred to PACU on propofol gtt in stable condition. Ambu bag used to assist patient ventilation.All monitors in place during transport.       Last vitals:   Visit Vitals  BP 95/60 (BP Location: Left arm, Patient Position: Lying)   Pulse 82   Temp 36.1 °C (97 °F) (Temporal)   Resp 14   Ht 5' 5" (1.651 m)   Wt 89.8 kg (198 lb)   SpO2 99%   BMI 32.95 kg/m²     "

## 2020-02-27 NOTE — SUBJECTIVE & OBJECTIVE
Past Medical History:   Diagnosis Date    Acute kidney injury     Arthritis     Asthma     Atrial fibrillation     Atrial fibrillation with RVR 8/7/2019    CHF (congestive heart failure)     Depression     Diabetes mellitus, type 2 Diagnosed in 2000    Elevated PSA     Hypertension     Sleep apnea        Past Surgical History:   Procedure Laterality Date    CHOLECYSTECTOMY         Review of patient's allergies indicates:  No Known Allergies    No current facility-administered medications on file prior to encounter.      Current Outpatient Medications on File Prior to Encounter   Medication Sig    ALCOHOL ANTISEPTIC PADS (ALCOHOL PREP PADS TOP)     amLODIPine (NORVASC) 10 MG tablet Take 10 mg by mouth once daily.     atorvastatin (LIPITOR) 40 MG tablet TAKE 1 TABLET EVERY EVENING    azaTHIOprine (IMURAN) 50 mg Tab Take 1 tablet (50 mg total) by mouth 2 (two) times daily.    baclofen (LIORESAL) 10 MG tablet Take 1 tablet (10 mg total) by mouth once daily.    blood-glucose meter (TRUE METRIX AIR GLUCOSE METER) kit TEST FOUR TIMES DAILY BEFORE MEALS  AND EVERY NIGHT    diltiaZEM (CARDIZEM SR) 60 MG Cp12 Take 1 capsule (60 mg total) by mouth 2 (two) times daily.    ELIQUIS 5 mg Tab Take 5 mg by mouth 2 (two) times daily.     food supplemt, lactose-reduced (ENSURE) Liqd Take 118 mLs by mouth 3 (three) times daily with meals.    inhalation spacing device Use as directed for inhalation.    insulin aspart U-100 (NOVOLOG FLEXPEN U-100 INSULIN) 100 unit/mL (3 mL) InPn pen INJECT 20 UNITS SUBCUTANEOUSLY THREE TIMES DAILY WITH MEALS    insulin aspart U-100 (NOVOLOG) 100 unit/mL (3 mL) InPn pen Inject 10 Units into the skin.    insulin syringe-needle U-100 1 mL 31 gauge x 5/16 Syrg     ipratropium (ATROVENT) 0.02 % nebulizer solution USE 1 VIAL VIA NEBULIZER FOUR TIMES DAILY    isosorbide mononitrate (IMDUR) 60 MG 24 hr tablet Take 1 tablet (60 mg total) by mouth once daily.    lancets 32 gauge Misc 1  "lancet by Misc.(Non-Drug; Combo Route) route 2 (two) times daily.    lisinopril 10 MG tablet Take 1 tablet (10 mg total) by mouth once daily.    metFORMIN (GLUCOPHAGE) 1000 MG tablet TAKE 1 TABLET (1,000 MG TOTAL) BY MOUTH 2 (TWO) TIMES DAILY WITH MEALS.    metOLazone (ZAROXOLYN) 2.5 MG tablet Take 1 tablet (2.5 mg total) by mouth every Mon, Wed, Fri.    metOLazone (ZAROXOLYN) 5 MG tablet Take 2.5 mg by mouth.    metoprolol succinate (TOPROL-XL) 50 MG 24 hr tablet Take 1 tablet (50 mg total) by mouth once daily.    nitroGLYCERIN (NITROSTAT) 0.3 MG SL tablet PLACE 1 TABLET UNDER THE TONGUE EVERY 5 MINUTES AS NEEDED FOR CHEST PAIN, NO MORE THAN 3 TABLETS IN ONE DAY    pantoprazole (PROTONIX) 40 MG tablet Take 1 tablet (40 mg total) by mouth once daily.    pen needle, diabetic (BD ULTRA-FINE SHORT PEN NEEDLE) 31 gauge x 5/16" Ndle Inject 1 each into the skin 3 (three) times daily.    sertraline (ZOLOFT) 100 MG tablet Take 100 mg by mouth.    sertraline (ZOLOFT) 50 MG tablet TAKE 1 TABLET ONE TIME DAILY    traZODone (DESYREL) 100 MG tablet Take 200 mg by mouth.    traZODone (DESYREL) 150 MG tablet TAKE 1 TABLET EVERY NIGHT    VIOS AEROSOL DELIVERY SYSTEM Shefali USE AS DIRESTED    aspirin (ECOTRIN) 81 MG EC tablet Take 1 tablet (81 mg total) by mouth once daily.    fluticasone-salmeterol 250-50 mcg/dose (ADVAIR DISKUS) 250-50 mcg/dose diskus inhaler Inhale 1 puff into the lungs 2 (two) times daily. Controller    tiotropium (SPIRIVA WITH HANDIHALER) 18 mcg inhalation capsule Inhale 1 capsule (18 mcg total) into the lungs once daily. Controller     Family History     Problem Relation (Age of Onset)    Cataracts Mother, Father, Sister    Glaucoma Mother, Sister, Maternal Aunt    No Known Problems Brother, Daughter, Son        Tobacco Use    Smoking status: Never Smoker    Smokeless tobacco: Never Used   Substance and Sexual Activity    Alcohol use: No    Drug use: No    Sexual activity: Yes     Partners: " Female     Review of Systems   Constitution: Negative for chills, decreased appetite, diaphoresis, fever, weight gain and weight loss.   Eyes: Negative for blurred vision.   Cardiovascular: Negative for chest pain, dyspnea on exertion, irregular heartbeat, leg swelling, near-syncope, orthopnea and palpitations.   Respiratory: Negative for cough, shortness of breath, snoring and wheezing.    Gastrointestinal: Negative for abdominal pain, nausea and vomiting.   Genitourinary: Negative for bladder incontinence and urgency.     Objective:     Vital Signs (Most Recent):  Temp: 96.1 °F (35.6 °C) (02/27/20 0800)  Pulse: 62 (02/27/20 0800)  Resp: 16 (02/27/20 0800)  BP: 130/76 (02/27/20 0826)  SpO2: 95 % (02/27/20 0800) Vital Signs (24h Range):  Temp:  [96.1 °F (35.6 °C)] 96.1 °F (35.6 °C)  Pulse:  [62] 62  Resp:  [16] 16  SpO2:  [95 %] 95 %  BP: (130-135)/(76-82) 130/76     Weight: 89.8 kg (198 lb)  Body mass index is 32.95 kg/m².    SpO2: 95 %  O2 Device (Oxygen Therapy): room air    No intake or output data in the 24 hours ending 02/27/20 1047    Lines/Drains/Airways     Peripheral Intravenous Line                 Peripheral IV - Single Lumen 02/27/20 0846 18 G Right Antecubital less than 1 day         Peripheral IV - Single Lumen 02/27/20 0846 20 G Left Forearm less than 1 day                Physical Exam   Constitutional: He is oriented to person, place, and time. He appears well-developed and well-nourished. No distress.   Neck: No JVD present.   Cardiovascular: Normal rate, regular rhythm, normal heart sounds and intact distal pulses. Exam reveals no gallop and no friction rub.   No murmur heard.  Pulmonary/Chest: Effort normal and breath sounds normal. No respiratory distress. He has no wheezes. He has no rales. He exhibits no tenderness.   Abdominal: Soft. Bowel sounds are normal. He exhibits no distension and no mass. There is no tenderness. There is no rebound and no guarding.   Musculoskeletal: Normal range of  motion. He exhibits no edema.   Neurological: He is alert and oriented to person, place, and time.   Skin: Skin is warm and dry. He is not diaphoretic. No erythema.   Nursing note and vitals reviewed.      Significant Labs: CMP No results for input(s): NA, K, CL, CO2, GLU, BUN, CREATININE, CALCIUM, PROT, ALBUMIN, BILITOT, ALKPHOS, AST, ALT, ANIONGAP, ESTGFRAFRICA, EGFRNONAA in the last 48 hours., CBC No results for input(s): WBC, HGB, HCT, PLT in the last 48 hours., INR No results for input(s): INR, PROTIME in the last 48 hours. and Lipid Panel No results for input(s): CHOL, HDL, LDLCALC, TRIG, CHOLHDL in the last 48 hours.    Significant Imaging: Echocardiogram: Transthoracic echo (TTE) complete (Cupid Only): No results found. However, due to the size of the patient record, not all encounters were searched. Please check Results Review for a complete set of results.

## 2020-02-27 NOTE — Clinical Note
Pt developed upper chest, face, neck whelps, redness and hives.  Dr. Brown at bedside, meds ordered and given per anes, benadryl, zofran, and solumedrol, vitals stable, no resp distress, will continue to plan to extubated

## 2020-02-27 NOTE — ANESTHESIA POSTPROCEDURE EVALUATION
Anesthesia Post Evaluation    Patient: Crow Green    Procedure(s) Performed: Procedure(s) (LRB):  Ablation atrial fibrillation (N/A)  Transesophageal echo (DAMIEN) intra-procedure log documentation (N/A)    Final Anesthesia Type: general    Patient location during evaluation: PACU  Patient participation: Yes- Able to Participate  Level of consciousness: awake  Post-procedure vital signs: reviewed and stable  Airway patency: patent    PONV status at discharge: No PONV  Anesthetic complications: yes  Perioperative Events: other medication reaction  Rozina-operative Events Comments: Likely allergic reaction related to protamine. Remained intubated for approximately 1 hour post procedure. Hives resolved, leak test was reassuring compared to intraop test during emergence. Extubated in PACU at approximately 16:50 to simple face mask. Continues to do well            Vitals Value Taken Time   /79 2/27/2020  5:16 PM   Temp 36.1 °C (97 °F) 2/27/2020  4:15 PM   Pulse 84 2/27/2020  5:24 PM   Resp 19 2/27/2020  5:24 PM   SpO2 100 % 2/27/2020  5:24 PM   Vitals shown include unvalidated device data.      No case tracking events are documented in the log.      Pain/Raymond Score: Raymond Score: 8 (2/27/2020  5:15 PM)

## 2020-02-27 NOTE — H&P
"Electrophysiology Pre Procedure H&P    HPI:   Crow Green is a 64 y.o. male with symptomatic therapy refractory AF, NICMO EF 40%, HTN, DM, and SANDRITA who presents to [facility] for outpatient elective PVI with Dr Brown. He has no fever or chills. No chest pain or pressure. No lightheadedness, dizziness, syncope, or presyncope. His last dose of apixaban was last night he thinks.     From Dr Brown's clinic note dated 11/20/19:  "He was undergoing a PFT and developed AF/RVR 8/7/19. He was very symptomatic in AF. He had hypotension leading to admission. He converted to NSR by 8/8/19. His EF was 40-45% (baseline NICM). He was started on eliquis. No known prior similar symptoms. He has CHADSVASC of 3.      Interval history: He presented with AF/RVR and was diagnosed with Influenza. No therapy for AF was provided. He feels his AF episodes are getting worse. Pulse regular today. He had an episode of syncope while getting up to use the bathroom early morning. No prior or subsequent events. He remains on apixaban for CVA prophylaxis.   NM 7/17:  Nuclear Quantitative Functional Analysis:   LVEF: 38 %  LVED Volume: 127 ml  LVES Volume: 79 ml  Impression: NORMAL MYOCARDIAL PERFUSION  1. The perfusion scan is free of evidence for myocardial ischemia or injury.   2. Resting wall motion is physiologic.   3. There is resting LV dysfunction with a reduced ejection fraction of 38 %.   4. The ventricular volumes are normal at rest and stress.   5. The extracardiac distribution of radioactivity is normal.   Echo 8/19:  CONCLUSIONS     1 - Mildly depressed left ventricular systolic function (EF 45-50%).     2 - Impaired LV relaxation, normal LAP (grade 1 diastolic dysfunction).     3 - Normal right ventricular systolic function .     4 - Moderate left atrial enlargement.     5 - Concentric hypertrophy."   Past Medical History:   Diagnosis Date    Acute kidney injury     Arthritis     Asthma     Atrial fibrillation     Atrial " fibrillation with RVR 8/7/2019    CHF (congestive heart failure)     Depression     Diabetes mellitus, type 2 Diagnosed in 2000    Elevated PSA     Hypertension     Sleep apnea         Social History     Socioeconomic History    Marital status: Legally      Spouse name: Not on file    Number of children: 5    Years of education: Not on file    Highest education level: Not on file   Occupational History    Occupation: disabled   Social Needs    Financial resource strain: Not on file    Food insecurity:     Worry: Not on file     Inability: Not on file    Transportation needs:     Medical: Not on file     Non-medical: Not on file   Tobacco Use    Smoking status: Never Smoker    Smokeless tobacco: Never Used   Substance and Sexual Activity    Alcohol use: No    Drug use: No    Sexual activity: Yes     Partners: Female   Lifestyle    Physical activity:     Days per week: Not on file     Minutes per session: Not on file    Stress: Rather much   Relationships    Social connections:     Talks on phone: Not on file     Gets together: Not on file     Attends Confucianism service: Not on file     Active member of club or organization: Not on file     Attends meetings of clubs or organizations: Not on file     Relationship status: Not on file   Other Topics Concern    Not on file   Social History Narrative    Not on file        Family History   Problem Relation Age of Onset    Glaucoma Mother     Cataracts Mother     Cataracts Father     Glaucoma Sister     Cataracts Sister     Glaucoma Maternal Aunt     No Known Problems Brother     No Known Problems Daughter     No Known Problems Son     Prostate cancer Neg Hx         Current Facility-Administered Medications   Medication Dose Route Frequency Provider Last Rate Last Dose    0.9%  NaCl infusion   Intravenous Continuous Daniella Medellin NP        sodium chloride 0.9% flush 5 mL  5 mL Intravenous PRN Daniella Medellin NP         "    Constitutional: (-)fevers, (-)chills, (-)night sweats  HEENT: (-) headaches (-) lightheadedness (-) blurry vision  Cardiovascular: (-)chest pain (-)paroxysmal nocturnal dyspnea (-)orthopnea  Respiratory: (-)shortness of breath, (-)dyspnea on exertion (-)hemoptysis  Gastrointestinal: (-)abdominal pain (-)nausea (-)vomiting (-)tarry stools  Musculoskeletal: (-)arthralgias (-)limited range of motion  Neurologic: (-)parasthesias (-)mood disorder (-)anxiety  Endo: (-)polyuria (-)polydipsia (-)heat/cold intolerance  Skin: (-)rash     Vitals:    02/27/20 0800 02/27/20 0821 02/27/20 0826   BP: 135/82  130/76   BP Location: Left arm  Right arm   Patient Position: Lying  Lying   Pulse: 62     Resp: 16     Temp: 96.1 °F (35.6 °C)     TempSrc: Oral     SpO2: 95%     Weight:  89.8 kg (198 lb)    Height:  5' 5" (1.651 m)      Physical Exam:   Gen: no acute distress, pleasant patient answering questions appropriately  HEENT: extra-ocular muscles intact, normocephalic-atraumatic  Neck: no jugular venous distension, no lymphadenopathy, no thyromegaly, no carotid bruits  CVS: irregularly irregular rhythm  CHEST: non labored respirations  ABD: Soft, non-tender, nondistended  EXT: No Edema, 2+ pulses bilaterally (radial, femoral, dorsales pedis, posterior tibial)  NEURO: awake, alert, and oriented   Skin: No rash     Review of Labs:   Lab Results   Component Value Date    INR 1.0 02/17/2020    INR 1.0 08/08/2019    INR 1.0 07/24/2019     Lab Results   Component Value Date    WBC 10.30 02/17/2020    HGB 12.8 (L) 02/17/2020    HCT 43.1 02/17/2020    MCV 91 02/17/2020     02/17/2020     CMP  Sodium   Date Value Ref Range Status   02/17/2020 138 136 - 145 mmol/L Final     Potassium   Date Value Ref Range Status   02/17/2020 4.2 3.5 - 5.1 mmol/L Final     Chloride   Date Value Ref Range Status   02/17/2020 96 95 - 110 mmol/L Final     CO2   Date Value Ref Range Status   02/17/2020 33 (H) 23 - 29 mmol/L Final     Glucose   Date " Value Ref Range Status   02/17/2020 94 70 - 110 mg/dL Final     BUN, Bld   Date Value Ref Range Status   02/17/2020 22 8 - 23 mg/dL Final     Creatinine   Date Value Ref Range Status   02/17/2020 1.1 0.5 - 1.4 mg/dL Final     Calcium   Date Value Ref Range Status   02/17/2020 10.0 8.7 - 10.5 mg/dL Final     Total Protein   Date Value Ref Range Status   12/13/2019 7.4 6.0 - 8.4 g/dL Final     Albumin   Date Value Ref Range Status   12/13/2019 3.6 3.5 - 5.2 g/dL Final   12/04/2014 4.0 3.6 - 5.1 g/dL Final     Comment:     @ Test Performed By:  [facility]  Gio De León M.D., FCAP.,   [address] [place] [id number]  IA #[id number]       Total Bilirubin   Date Value Ref Range Status   12/13/2019 0.3 0.1 - 1.0 mg/dL Final     Comment:     For infants and newborns, interpretation of results should be based  on gestational age, weight and in agreement with clinical  observations.  Premature Infant recommended reference ranges:  Up to 24 hours.............<8.0 mg/dL  Up to 48 hours............<12.0 mg/dL  3-5 days..................<15.0 mg/dL  6-29 days.................<15.0 mg/dL       Alkaline Phosphatase   Date Value Ref Range Status   12/13/2019 66 55 - 135 U/L Final     AST   Date Value Ref Range Status   12/13/2019 12 10 - 40 U/L Final     ALT   Date Value Ref Range Status   12/13/2019 11 10 - 44 U/L Final     Anion Gap   Date Value Ref Range Status   02/17/2020 9 8 - 16 mmol/L Final     eGFR if    Date Value Ref Range Status   02/17/2020 >60.0 >60 mL/min/1.73 m^2 Final     eGFR if non    Date Value Ref Range Status   02/17/2020 >60.0 >60 mL/min/1.73 m^2 Final     Comment:     Calculation used to obtain the estimated glomerular filtration  rate (eGFR) is the CKD-EPI equation.        Most recent ECG  afib    Assessment/Plan:  Crow Green is a 64 y.o. male with symptomatic therapy refractory AF, NICMO EF 40%, HTN, DM,  and SANDRITA who presents to [facility] for outpatient elective PVI with Dr Brown.  We discussed the alternatives, benefits and risks of the procedure including pain, infection, bleeding, injury to veins and arteries including aneurysms and fistulas, injury to heart valves, puncture of the heart leading to pericardial effusion or tamponade requiring tube drainage, atrial-esophageal fistula, heart attack, stroke and death.  Consent signed and placed in chart.   In AF, so will need DAMIEN today.

## 2020-02-27 NOTE — PROGRESS NOTES
MD Portillo at bedside. Propofol off at this time. Patient tolerating well. Respiratory therpist at bedside preparing for extubation.

## 2020-02-27 NOTE — BRIEF OP NOTE
: Gio Brown MD    Post-operative Diagnosis: AF    Procedure Performed: Pulmonary vein isolation of all 4 pulmonary veins via radiofrequency ablation.     Description of Procedure: The patient was brought to the EP lab in the fasting state. Prepped and draped in sterile fashion. Safety timeout was performed. Sedation administered by anesthesia staff. Ultrasound guided venous access of the bilateral femoral veins was performed. ICE and CS catheters placed via left femoral vein access. Long sheaths via right femoral venous access. Heparin bolus and continuous infusion per protocol. Two transseptal punctures performed using combination of fluoroscopic and ICE guidance. Map created. RFA to isolate all pulmonary veins. ICE confirmed no significant pericardial effusion.     EBL: <10 mL    Specimens Removed: None  Complications: no immediate    Protamine given to reverse heparin. In the lab, he developed hives and urticaria following protamine administration. Benadryl and solumedrol given for allergic reaction.

## 2020-02-27 NOTE — Clinical Note
Manual pressure applied to the right femoral vein sheath insertion site. Bilateral groins sutured x 1 on left and x 2 on rights

## 2020-02-27 NOTE — ANESTHESIA PREPROCEDURE EVALUATION
02/27/2020  Crow Green is a 64 y.o., male.  Patient Active Problem List   Diagnosis    ED (erectile dysfunction)    Non-proliferative diabetic retinopathy, mild, both eyes    Hypertension associated with diabetes    Diabetic polyneuropathy    Coronary artery disease of native artery of native heart with stable angina pectoris    Chronic systolic congestive heart failure    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, with long-term current use of insulin    SANDRITA on CPAP    Moderate asthma    Psychophysiological insomnia    Nightmares    Sleep related gastroesophageal reflux disease    Inadequate sleep hygiene    PAF (paroxysmal atrial fibrillation)    At risk for medication noncompliance    Obesity (BMI 30.0-34.9)    Indeterminate stage chronic open angle glaucoma    Right hip pain    Gait instability    Chronic right shoulder pain    Arthritis    Left sided sciatica    Dyspnea on exertion    Osteoarthritis of spine with radiculopathy, lumbar region    HNP (herniated nucleus pulposus), lumbar    Gastroesophageal reflux disease without esophagitis    Chronic diastolic heart failure    Hypogonadism in male    Disorder of parathyroid gland    Hyperlipidemia associated with type 2 diabetes mellitus    LRTI (lower respiratory tract infection)    Physical deconditioning    Acute pain of left shoulder    Traumatic tear of left rotator cuff    Atrial fibrillation with RVR   Nuclear Quantitative Functional Analysis:   LVEF: 38 %  LVED Volume: 127 ml  LVES Volume: 79 ml     Impression: NORMAL MYOCARDIAL PERFUSION  1. The perfusion scan is free of evidence for myocardial ischemia or injury.   2. Resting wall motion is physiologic.   3. There is resting LV dysfunction with a reduced ejection fraction of 38 %.   4. The  ventricular volumes are normal at rest and stress.   5. The extracardiac distribution of radioactivity is normal.      Echo 8/19:  CONCLUSIONS     1 - Mildly depressed left ventricular systolic function (EF 45-50%).     2 - Impaired LV relaxation, normal LAP (grade 1 diastolic dysfunction).     3 - Normal right ventricular systolic function .     4 - Moderate left atrial enlargement.     5 - Concentric hypertrophy.       Anesthesia Evaluation         Review of Systems         Anesthesia Plan  Type of Anesthesia, risks & benefits discussed:  Anesthesia Type:  general  Patient's Preference: General  Intra-op Monitoring Plan: standard ASA monitors  Intra-op Monitoring Plan Comments: Standard ASA monitors.   Post Op Pain Control Plan: per primary service following discharge from PACU  Post Op Pain Control Plan Comments: Per primary service.     Induction:   IV  Beta Blocker:  Patient is not currently on a Beta-Blocker (No further documentation required).       Informed Consent: Patient understands risks and agrees with Anesthesia plan.  Questions answered. Anesthesia consent signed with patient.  ASA Score: 3     Day of Surgery Review of History & Physical:    H&P update referred to the surgeon.     Anesthesia Plan Notes: Chart reviewed, patient interviewed and examined.  The plan for general anesthesia was explained.  Questions were answered and the consent was signed.  Agnes PUENTE         Ready For Surgery From Anesthesia Perspective.

## 2020-02-27 NOTE — ANESTHESIA PROCEDURE NOTES
Arterial    Diagnosis: a fib    Patient location during procedure: done in OR  Procedure start time: 2/27/2020 12:40 PM  Procedure end time: 2/27/2020 12:42 PM    Staffing  Authorizing Provider: Jermaine Connelly MD  Performing Provider: Jermaine Connelly MD    Anesthesiologist was present at the time of the procedure.    Preanesthetic Checklist  Completed: patient identified, site marked, surgical consent, pre-op evaluation, timeout performed, IV checked, risks and benefits discussed, monitors and equipment checked and anesthesia consent givenArterial  Skin Prep: chlorhexidine gluconate  Local Infiltration: none  Orientation: right  Location: radial  Catheter Size: 20 G  Catheter placement by Ultrasound guidance. Heme positive aspiration all ports.  Vessel Caliber: medium, patent, compressibility normal  Needle advanced into vessel with real time Ultrasound guidance.Insertion Attempts: 1  Assessment  Dressing: secured with tape and tegaderm

## 2020-02-27 NOTE — PROGRESS NOTES
Pt is AAOx4 and denies pain.  VSS.  Admit questions completed.  IV access obtained.  Report given to Ana MCGUIRE EP charge nurse.  Will continue to monitor.

## 2020-02-27 NOTE — PROGRESS NOTES
Patient extubated at this time. Patient tolerated well. Patient placed on 6 liters simple face mask. Tolerating well. Patient fogging SFM. Will continue to monitor

## 2020-02-27 NOTE — HPI
Crow Green is a 63 yo M with PMHx AF and HFmEF (EF 45-50%) who presents for DAMIEN +/- PVI with Dr Brown. Patient is on Eliquis for CVA ppx. Cancel DAMIEN if SR.     TTE 8/8/2019:     CONCLUSIONS     1 - Mildly depressed left ventricular systolic function (EF 45-50%).     2 - Impaired LV relaxation, normal LAP (grade 1 diastolic dysfunction).     3 - Normal right ventricular systolic function .     4 - Moderate left atrial enlargement.     5 - Concentric hypertrophy.

## 2020-02-27 NOTE — ASSESSMENT & PLAN NOTE
1. DAMIEN for evaluation of afib r/o LA/BARBARA thrombus  -No absolute contraindications of esophageal stricture, tumor, perforation, laceration,or diverticulum and/or active GI bleed  -The risks, benefits & alternatives of the procedure were explained to the patient.   -The risks of transesophageal echo include but are not limited to:  Dental trauma, esophageal trauma/perforation, bleeding, laryngospasm/brochospasm, aspiration, sore throat/hoarseness, & dislodgement of the endotracheal tube/nasogastric tube (where applicable).    -The risks of moderate sedation include hypotension, respiratory depression, arrhythmias, bronchospasm, & death.    -Informed consent was obtained. The patient is agreeable to proceed with the procedure and all questions and concerns addressed.    Case discussed with an attending in echocardiography lab.     Further recommendations per attending addendum

## 2020-02-27 NOTE — ANESTHESIA PROCEDURE NOTES
Intubation  Performed by: Brendan Bird CRNA  Authorized by: Jermaine Connelly MD     Intubation:     Induction:  Intravenous    Intubated:  Postinduction    Mask Ventilation:  Easy with oral airway    Attempts:  1    Attempted By:  Student (DONNELL Knight)    Method of Intubation:  Direct    Blade:  Iqbal 3    Laryngeal View Grade: Grade I - full view of chords      Difficult Airway Encountered?: No      Complications:  None    Airway Device:  Oral endotracheal tube    Airway Device Size:  7.5    Style/Cuff Inflation:  Cuffed (inflated to minimal occlusive pressure)    Tube secured:  23    Secured at:  The lips    Placement Verified By:  Capnometry    Complicating Factors:  Obesity, short neck, oropharyngeal edema or fat and poor neck/head extension    Findings Post-Intubation:  BS equal bilateral and atraumatic/condition of teeth unchanged

## 2020-02-27 NOTE — CONSULTS
[facility] Card[facility]  Cardiology  Consult Note    Patient Name: Crow Green  MRN: [id number]  Admission Date: 2/27/2020  Hospital Length of Stay: 0 days  Code Status: Prior   Attending Provider: Gio Brown MD   Consulting Provider: Tracy Rizzo MD  Primary Care Physician: Mateo Lambert DO  Principal Problem:<principal problem not specified>    Patient information was obtained from patient and ER records.     Consults  Subjective:     Chief Complaint:  Afib     HPI:   Crow Green is a 63 yo M with PMHx AF and HFmEF (EF 45-50%) who presents for DAMIEN +/- PVI with Dr Brown. Patient is on Eliquis for CVA ppx. Cancel DAMIEN if SR.     TTE 8/8/2019:     CONCLUSIONS     1 - Mildly depressed left ventricular systolic function (EF 45-50%).     2 - Impaired LV relaxation, normal LAP (grade 1 diastolic dysfunction).     3 - Normal right ventricular systolic function .     4 - Moderate left atrial enlargement.     5 - Concentric hypertrophy.       Past Medical History:   Diagnosis Date    Acute kidney injury     Arthritis     Asthma     Atrial fibrillation     Atrial fibrillation with RVR 8/7/2019    CHF (congestive heart failure)     Depression     Diabetes mellitus, type 2 Diagnosed in 2000    Elevated PSA     Hypertension     Sleep apnea        Past Surgical History:   Procedure Laterality Date    CHOLECYSTECTOMY         Review of patient's allergies indicates:  No Known Allergies    No current facility-administered medications on file prior to encounter.      Current Outpatient Medications on File Prior to Encounter   Medication Sig    ALCOHOL ANTISEPTIC PADS (ALCOHOL PREP PADS TOP)     amLODIPine (NORVASC) 10 MG tablet Take 10 mg by mouth once daily.     atorvastatin (LIPITOR) 40 MG tablet TAKE 1 TABLET EVERY EVENING    azaTHIOprine (IMURAN) 50 mg Tab Take 1 tablet (50 mg total) by mouth 2 (two) times daily.    baclofen (LIORESAL) 10 MG tablet Take 1  "tablet (10 mg total) by mouth once daily.    blood-glucose meter (TRUE METRIX AIR GLUCOSE METER) kit TEST FOUR TIMES DAILY BEFORE MEALS  AND EVERY NIGHT    diltiaZEM (CARDIZEM SR) 60 MG Cp12 Take 1 capsule (60 mg total) by mouth 2 (two) times daily.    ELIQUIS 5 mg Tab Take 5 mg by mouth 2 (two) times daily.     food supplemt, lactose-reduced (ENSURE) Liqd Take 118 mLs by mouth 3 (three) times daily with meals.    inhalation spacing device Use as directed for inhalation.    insulin aspart U-100 (NOVOLOG FLEXPEN U-100 INSULIN) 100 unit/mL (3 mL) InPn pen INJECT 20 UNITS SUBCUTANEOUSLY THREE TIMES DAILY WITH MEALS    insulin aspart U-100 (NOVOLOG) 100 unit/mL (3 mL) InPn pen Inject 10 Units into the skin.    insulin syringe-needle U-100 1 mL 31 gauge x 5/16 Syrg     ipratropium (ATROVENT) 0.02 % nebulizer solution USE 1 VIAL VIA NEBULIZER FOUR TIMES DAILY    isosorbide mononitrate (IMDUR) 60 MG 24 hr tablet Take 1 tablet (60 mg total) by mouth once daily.    lancets 32 gauge Misc 1 lancet by Misc.(Non-Drug; Combo Route) route 2 (two) times daily.    lisinopril 10 MG tablet Take 1 tablet (10 mg total) by mouth once daily.    metFORMIN (GLUCOPHAGE) 1000 MG tablet TAKE 1 TABLET (1,000 MG TOTAL) BY MOUTH 2 (TWO) TIMES DAILY WITH MEALS.    metOLazone (ZAROXOLYN) 2.5 MG tablet Take 1 tablet (2.5 mg total) by mouth every Mon, Wed, Fri.    metOLazone (ZAROXOLYN) 5 MG tablet Take 2.5 mg by mouth.    metoprolol succinate (TOPROL-XL) 50 MG 24 hr tablet Take 1 tablet (50 mg total) by mouth once daily.    nitroGLYCERIN (NITROSTAT) 0.3 MG SL tablet PLACE 1 TABLET UNDER THE TONGUE EVERY 5 MINUTES AS NEEDED FOR CHEST PAIN, NO MORE THAN 3 TABLETS IN ONE DAY    pantoprazole (PROTONIX) 40 MG tablet Take 1 tablet (40 mg total) by mouth once daily.    pen needle, diabetic (BD ULTRA-FINE SHORT PEN NEEDLE) 31 gauge x 5/16" Ndle Inject 1 each into the skin 3 (three) times daily.    sertraline (ZOLOFT) 100 MG tablet Take " 100 mg by mouth.    sertraline (ZOLOFT) 50 MG tablet TAKE 1 TABLET ONE TIME DAILY    traZODone (DESYREL) 100 MG tablet Take 200 mg by mouth.    traZODone (DESYREL) 150 MG tablet TAKE 1 TABLET EVERY NIGHT    VIOS AEROSOL DELIVERY SYSTEM Shefali USE AS DIRESTED    aspirin (ECOTRIN) 81 MG EC tablet Take 1 tablet (81 mg total) by mouth once daily.    fluticasone-salmeterol 250-50 mcg/dose (ADVAIR DISKUS) 250-50 mcg/dose diskus inhaler Inhale 1 puff into the lungs 2 (two) times daily. Controller    tiotropium (SPIRIVA WITH HANDIHALER) 18 mcg inhalation capsule Inhale 1 capsule (18 mcg total) into the lungs once daily. Controller     Family History     Problem Relation (Age of Onset)    Cataracts Mother, Father, Sister    Glaucoma Mother, Sister, Maternal Aunt    No Known Problems Brother, Daughter, Son        Tobacco Use    Smoking status: Never Smoker    Smokeless tobacco: Never Used   Substance and Sexual Activity    Alcohol use: No    Drug use: No    Sexual activity: Yes     Partners: Female     Review of Systems   Constitution: Negative for chills, decreased appetite, diaphoresis, fever, weight gain and weight loss.   Eyes: Negative for blurred vision.   Cardiovascular: Negative for chest pain, dyspnea on exertion, irregular heartbeat, leg swelling, near-syncope, orthopnea and palpitations.   Respiratory: Negative for cough, shortness of breath, snoring and wheezing.    Gastrointestinal: Negative for abdominal pain, nausea and vomiting.   Genitourinary: Negative for bladder incontinence and urgency.     Objective:     Vital Signs (Most Recent):  Temp: 96.1 °F (35.6 °C) (02/27/20 0800)  Pulse: 62 (02/27/20 0800)  Resp: 16 (02/27/20 0800)  BP: 130/76 (02/27/20 0826)  SpO2: 95 % (02/27/20 0800) Vital Signs (24h Range):  Temp:  [96.1 °F (35.6 °C)] 96.1 °F (35.6 °C)  Pulse:  [62] 62  Resp:  [16] 16  SpO2:  [95 %] 95 %  BP: (130-135)/(76-82) 130/76     Weight: 89.8 kg (198 lb)  Body mass index is 32.95  kg/m².    SpO2: 95 %  O2 Device (Oxygen Therapy): room air    No intake or output data in the 24 hours ending 02/27/20 1047    Lines/Drains/Airways     Peripheral Intravenous Line                 Peripheral IV - Single Lumen 02/27/20 0846 18 G Right Antecubital less than 1 day         Peripheral IV - Single Lumen 02/27/20 0846 20 G Left Forearm less than 1 day                Physical Exam   Constitutional: He is oriented to person, place, and time. He appears well-developed and well-nourished. No distress.   Neck: No JVD present.   Cardiovascular: Normal rate, regular rhythm, normal heart sounds and intact distal pulses. Exam reveals no gallop and no friction rub.   No murmur heard.  Pulmonary/Chest: Effort normal and breath sounds normal. No respiratory distress. He has no wheezes. He has no rales. He exhibits no tenderness.   Abdominal: Soft. Bowel sounds are normal. He exhibits no distension and no mass. There is no tenderness. There is no rebound and no guarding.   Musculoskeletal: Normal range of motion. He exhibits no edema.   Neurological: He is alert and oriented to person, place, and time.   Skin: Skin is warm and dry. He is not diaphoretic. No erythema.   Nursing note and vitals reviewed.      Significant Labs: CMP No results for input(s): NA, K, CL, CO2, GLU, BUN, CREATININE, CALCIUM, PROT, ALBUMIN, BILITOT, ALKPHOS, AST, ALT, ANIONGAP, ESTGFRAFRICA, EGFRNONAA in the last 48 hours., CBC No results for input(s): WBC, HGB, HCT, PLT in the last 48 hours., INR No results for input(s): INR, PROTIME in the last 48 hours. and Lipid Panel No results for input(s): CHOL, HDL, LDLCALC, TRIG, CHOLHDL in the last 48 hours.    Significant Imaging: Echocardiogram: Transthoracic echo (TTE) complete (Cupid Only): No results found. However, due to the size of the patient record, not all encounters were searched. Please check Results Review for a complete set of results.    Assessment and Plan:     Atrial fibrillation with  RVR  1. DAMIEN for evaluation of afib r/o LA/BARBARA thrombus  -No absolute contraindications of esophageal stricture, tumor, perforation, laceration,or diverticulum and/or active GI bleed  -The risks, benefits & alternatives of the procedure were explained to the patient.   -The risks of transesophageal echo include but are not limited to:  Dental trauma, esophageal trauma/perforation, bleeding, laryngospasm/brochospasm, aspiration, sore throat/hoarseness, & dislodgement of the endotracheal tube/nasogastric tube (where applicable).    -The risks of moderate sedation include hypotension, respiratory depression, arrhythmias, bronchospasm, & death.    -Informed consent was obtained. The patient is agreeable to proceed with the procedure and all questions and concerns addressed.    Case discussed with an attending in echocardiography lab.     Further recommendations per attending addendum          VTE Risk Mitigation (From admission, onward)    None          Thank you for your consult. I will sign off. Please contact us if you have any additional questions.    Tracy Rizzo MD  Cardiology   [facility]

## 2020-02-28 VITALS
HEIGHT: 65 IN | OXYGEN SATURATION: 93 % | BODY MASS INDEX: 32.99 KG/M2 | TEMPERATURE: 98 F | RESPIRATION RATE: 18 BRPM | HEART RATE: 105 BPM | SYSTOLIC BLOOD PRESSURE: 137 MMHG | WEIGHT: 198 LBS | DIASTOLIC BLOOD PRESSURE: 72 MMHG

## 2020-02-28 LAB
POCT GLUCOSE: 264 MG/DL (ref 70–110)
POCT GLUCOSE: 365 MG/DL (ref 70–110)

## 2020-02-28 PROCEDURE — 82962 GLUCOSE BLOOD TEST: CPT | Mod: HCNC

## 2020-02-28 PROCEDURE — 63600175 PHARM REV CODE 636 W HCPCS: Mod: HCNC | Performed by: STUDENT IN AN ORGANIZED HEALTH CARE EDUCATION/TRAINING PROGRAM

## 2020-02-28 PROCEDURE — 25000003 PHARM REV CODE 250: Mod: HCNC | Performed by: STUDENT IN AN ORGANIZED HEALTH CARE EDUCATION/TRAINING PROGRAM

## 2020-02-28 RX ADMIN — BACLOFEN 10 MG: 10 TABLET ORAL at 08:02

## 2020-02-28 RX ADMIN — METOPROLOL SUCCINATE 50 MG: 50 TABLET, EXTENDED RELEASE ORAL at 08:02

## 2020-02-28 RX ADMIN — AZATHIOPRINE 50 MG: 50 TABLET ORAL at 11:02

## 2020-02-28 RX ADMIN — APIXABAN 5 MG: 5 TABLET, FILM COATED ORAL at 08:02

## 2020-02-28 RX ADMIN — ISOSORBIDE MONONITRATE 60 MG: 60 TABLET, EXTENDED RELEASE ORAL at 08:02

## 2020-02-28 RX ADMIN — GABAPENTIN 800 MG: 400 CAPSULE ORAL at 07:02

## 2020-02-28 RX ADMIN — LISINOPRIL 10 MG: 10 TABLET ORAL at 08:02

## 2020-02-28 RX ADMIN — ASPIRIN 81 MG: 81 TABLET, COATED ORAL at 08:02

## 2020-02-28 RX ADMIN — FUROSEMIDE 40 MG: 40 TABLET ORAL at 08:02

## 2020-02-28 RX ADMIN — DILTIAZEM HYDROCHLORIDE 60 MG: 30 TABLET, FILM COATED ORAL at 08:02

## 2020-02-28 RX ADMIN — INSULIN ASPART 10 UNITS: 100 INJECTION, SOLUTION INTRAVENOUS; SUBCUTANEOUS at 08:02

## 2020-02-28 NOTE — DISCHARGE SUMMARY
[facility]  Cardiac Electrophysiology  Discharge Summary      Patient Name: Crow Green  MRN: [id number]  Admission Date: 2/27/2020  Hospital Length of Stay: 0 days  Discharge Date and Time: 2/28/2020 12:15 PM  Attending Physician: Gio Brown MD   Discharging Provider: Bradley Hull MD  Primary Care Physician: Mateo Lambert DO    HPI:   Crow Green is a 64 y.o. male with symptomatic therapy refractory AF, NICMO EF 40%, HTN, DM, and SANDRITA who presented to [facility] for outpatient elective PVI with Dr Brown.   Procedure(s) (LRB):  Ablation atrial fibrillation (N/A)  Transesophageal echo (DAMIEN) intra-procedure log documentation (N/A)     Indwelling Lines/Drains at time of discharge:  Lines/Drains/Airways     None                 Hospital Course:  Underwent PVI with Dr Brown. No immediate complications. Tolerated well.   Significant Diagnostic Studies: Labs:   BMP: No results for input(s): GLU, NA, K, CL, CO2, BUN, CREATININE, CALCIUM, MG in the last 48 hours., CMP No results for input(s): NA, K, CL, CO2, GLU, BUN, CREATININE, CALCIUM, PROT, ALBUMIN, BILITOT, ALKPHOS, AST, ALT, ANIONGAP, ESTGFRAFRICA, EGFRNONAA in the last 48 hours., CBC No results for input(s): WBC, HGB, HCT, PLT in the last 48 hours. and INR   Lab Results   Component Value Date    INR 1.0 02/17/2020    INR 1.0 08/08/2019    INR 1.0 07/24/2019       Pending Diagnostic Studies:     None          Final Active Diagnoses:    Diagnosis Date Noted POA    Atrial fibrillation with RVR [I48.91] 02/27/2020 Yes      Problems Resolved During this Admission:     No new Assessment & Plan notes have been filed under this hospital service since the last note was generated.  Service: Arrhythmia      Discharged Condition: good    Disposition: Home or Self Care    Follow Up:  Follow-up Information     Gio Brown MD In 6 weeks.    Specialties:  Electrophysiology, Cardiovascular Disease  Contact  information:  [address]  [facility] [address]  [phone]                 Patient Instructions:      Other restrictions (specify):   Order Comments: Do not strain or lift anything greater than 5 lb for 1 week.  Do not drive or operate any dangerous machinery for 24 hours.   Clean the area with mild soap and water and then cover it with a bandage.   Once the skin has healed, bathing in a tub or swimming is allowed.   Inspect the groin site daily and report to the physician any swelling at the site that   cannot be controlled with manual pressure for 10 minutes, unusual pain at the   access site or affected extremity, unusual swelling at the access site, or signs or   symptoms of infection such as redness, pain, or fever.   Call 911 if you have:   Bleeding from the puncture site that you cannot stop by doing the following:   Relax and lie down right away. Keep your leg flat and apply firm pressure to the site using your fingers and a gauze pad. Keep the pressure on for 20 minutes. Continue this until the bleeding stops. This may take awhile. When bleeding stops, cover the site with a sterile bandage and keep your leg still as much as possible.     Medications:  Reconciled Home Medications:      Medication List      CONTINUE taking these medications    ALCOHOL PREP PADS TOP     amLODIPine 10 MG tablet  Commonly known as:  NORVASC  Take 10 mg by mouth once daily.     aspirin 81 MG EC tablet  Commonly known as:  ECOTRIN  Take 1 tablet (81 mg total) by mouth once daily.     atorvastatin 40 MG tablet  Commonly known as:  LIPITOR  TAKE 1 TABLET EVERY EVENING     azaTHIOprine 50 mg Tab  Commonly known as:  IMURAN  Take 1 tablet (50 mg total) by mouth 2 (two) times daily.     baclofen 10 MG tablet  Commonly known as:  LIORESAL  Take 1 tablet (10 mg total) by mouth once daily.     blood sugar diagnostic Strp  Commonly known as:  True Metrix Glucose Test Strip  Use with blood glucose meter kit to test blood sugar  four times  daily     blood-glucose meter kit  Commonly known as:  True Metrix Air Glucose Meter  TEST FOUR TIMES DAILY BEFORE MEALS  AND EVERY NIGHT     diltiaZEM 60 MG Cp12  Commonly known as:  CARDIZEM SR  Take 1 capsule (60 mg total) by mouth 2 (two) times daily.     Eliquis 5 mg Tab  Generic drug:  apixaban  Take 5 mg by mouth 2 (two) times daily.     fluticasone-salmeterol 250-50 mcg/dose 250-50 mcg/dose diskus inhaler  Commonly known as:  Advair Diskus  Inhale 1 puff into the lungs 2 (two) times daily. Controller     food supplemt, lactose-reduced Liqd  Commonly known as:  Ensure  Take 118 mLs by mouth 3 (three) times daily with meals.     furosemide 40 MG tablet  Commonly known as:  LASIX  TAKE 2 TABLETS (80 MG TOTAL) EVERY MORNING AND 1 TABLET (40 MG TOTAL) EVERY EVENING.     gabapentin 800 MG tablet  Commonly known as:  NEURONTIN  Take 1 tablet (800 mg total) by mouth 3 (three) times daily.     glimepiride 2 MG tablet  Commonly known as:  AMARYL  TAKE 1 TABLET (2 MG TOTAL) BY MOUTH BEFORE BREAKFAST.     inhalation spacing device  Use as directed for inhalation.     * insulin aspart U-100 100 unit/mL (3 mL) Inpn pen  Commonly known as:  NovoLOG Flexpen U-100 Insulin  INJECT 20 UNITS SUBCUTANEOUSLY THREE TIMES DAILY WITH MEALS     * insulin aspart U-100 100 unit/mL (3 mL) Inpn pen  Commonly known as:  NovoLOG  Inject 10 Units into the skin.     insulin syringe-needle U-100 1 mL 31 gauge x 5/16 Syrg     ipratropium 0.02 % nebulizer solution  Commonly known as:  ATROVENT  USE 1 VIAL VIA NEBULIZER FOUR TIMES DAILY     isosorbide mononitrate 60 MG 24 hr tablet  Commonly known as:  IMDUR  Take 1 tablet (60 mg total) by mouth once daily.     ketorolac 10 mg tablet  Commonly known as:  TORADOL  Take 1 tablet (10 mg total) by mouth every 8 (eight) hours as needed for Pain.     lancets 32 gauge Misc  1 lancet by Misc.(Non-Drug; Combo Route) route 2 (two) times daily.     Lantus Solostar U-100 Insulin glargine 100  "units/mL (3mL) SubQ pen  Generic drug:  insulin  INJECT 80 UNITS SUBCUTANEOUSLY EVERY EVENING     latanoprost 0.005 % ophthalmic solution  INSTILL 1 DROP INTO BOTH EYES EVERY EVENING     lisinopril 10 MG tablet  Take 1 tablet (10 mg total) by mouth once daily.     metFORMIN 1000 MG tablet  Commonly known as:  GLUCOPHAGE  TAKE 1 TABLET (1,000 MG TOTAL) BY MOUTH 2 (TWO) TIMES DAILY WITH MEALS.     * metOLazone 2.5 MG tablet  Commonly known as:  ZAROXOLYN  Take 1 tablet (2.5 mg total) by mouth every Mon, Wed, Fri.     * metOLazone 5 MG tablet  Commonly known as:  ZAROXOLYN  Take 2.5 mg by mouth.     metoprolol succinate 50 MG 24 hr tablet  Commonly known as:  TOPROL-XL  Take 1 tablet (50 mg total) by mouth once daily.     nitroGLYCERIN 0.3 MG SL tablet  Commonly known as:  NITROSTAT  PLACE 1 TABLET UNDER THE TONGUE EVERY 5 MINUTES AS NEEDED FOR CHEST PAIN, NO MORE THAN 3 TABLETS IN ONE DAY     pantoprazole 40 MG tablet  Commonly known as:  PROTONIX  Take 1 tablet (40 mg total) by mouth once daily.     pen needle, diabetic 31 gauge x 5/16" Ndle  Commonly known as:  BD Ultra-Fine Short Pen Needle  Inject 1 each into the skin 3 (three) times daily.     semaglutide 0.25 mg or 0.5 mg(2 mg/1.5 mL) Pnij  Commonly known as:  Ozempic  Inject 1 mg into the skin once a week.     * sertraline 100 MG tablet  Commonly known as:  ZOLOFT  Take 100 mg by mouth.     * sertraline 50 MG tablet  Commonly known as:  ZOLOFT  TAKE 1 TABLET ONE TIME DAILY     tamsulosin 0.4 mg Cap  Commonly known as:  Flomax  Take 1 capsule (0.4 mg total) by mouth once daily.     tiotropium 18 mcg inhalation capsule  Commonly known as:  Spiriva with HandiHaler  Inhale 1 capsule (18 mcg total) into the lungs once daily. Controller     * traZODone 100 MG tablet  Commonly known as:  DESYREL  Take 200 mg by mouth.     * traZODone 150 MG tablet  Commonly known as:  DESYREL  TAKE 1 TABLET EVERY NIGHT     Vios Aerosol Delivery System Shefali  Generic drug:  nebulizer " and compressor  USE AS DIRESTED         * This list has 8 medication(s) that are the same as other medications prescribed for you. Read the directions carefully, and ask your doctor or other care provider to review them with you.                Time spent on the discharge of patient: 30 minutes    Bradley Hull MD  Cardiac Electrophysiology  [facility]

## 2020-02-28 NOTE — NURSING TRANSFER
Nursing Transfer Note      2/27/2020     Transfer To: [id number]    Transfer via stretcher    Transfer with cardiac monitoring    Transported by PCT    Medicines sent: None    Chart send with patient: Yes    Notified: sister    Patient reassessed at: 2/27/20 6325    Upon arrival to floor: cardiac monitor applied, patient oriented to room, call bell in reach and bed in lowest position

## 2020-02-28 NOTE — PROGRESS NOTES
Bilateral groin sutures removed per protocol. Gauze/tegaderm dressings applied. No hematoma or active bleeding noted.

## 2020-02-28 NOTE — PLAN OF CARE
Pt AAO x3, NAD, s/p PVI on 2/27/20 per vin Keane groin sites, dsgs CDI, no active bleeding and no hematoma noted. Pt voids and ambulates without any difficulty. Plan of care reviewed with pt. Call light within pt reach, pt instructed to call staff for any needed assistance, safety maintained. Pt denies needs/concerns at this time, will continue to monitor.

## 2020-02-28 NOTE — NURSING
Bedrest completed, pt ambulated in hallway with walker and staff without any difficulty, bilat groin sites CDI, no active bleeding and no hematoma noted. Luis d/c'd as ordered, will continue to monitor.

## 2020-02-28 NOTE — NURSING
Discharge instructions and medlist given.  Patient verbalized understanding.   Off unit via wheelchair with ESME Martinez to meet his cousin in the front of the hospital for a ride to the bus terminal to take a bus home.

## 2020-02-28 NOTE — PLAN OF CARE
Patient s/p PVI, AAOx3. VSS, no c/o pain or discomfort at this time, resp even and unlabored. Bilateral groin sutures intact. No active bleeding. No hematoma noted. Post procedure protocol reviewed with patient. Understanding verbalized. Nurse call bell within reach. Will continue to monitor per post procedure protocol.

## 2020-03-02 ENCOUNTER — TELEPHONE (OUTPATIENT)
Dept: RADIOLOGY | Facility: HOSPITAL | Age: 65
End: 2020-03-02

## 2020-03-02 ENCOUNTER — OFFICE VISIT (OUTPATIENT)
Dept: PAIN MEDICINE | Facility: CLINIC | Age: 65
End: 2020-03-02
Payer: MEDICARE

## 2020-03-02 ENCOUNTER — OFFICE VISIT (OUTPATIENT)
Dept: ORTHOPEDICS | Facility: CLINIC | Age: 65
End: 2020-03-02
Payer: MEDICARE

## 2020-03-02 ENCOUNTER — TELEPHONE (OUTPATIENT)
Dept: PODIATRY | Facility: CLINIC | Age: 65
End: 2020-03-02

## 2020-03-02 ENCOUNTER — TELEPHONE (OUTPATIENT)
Dept: INTERNAL MEDICINE | Facility: CLINIC | Age: 65
End: 2020-03-02

## 2020-03-02 ENCOUNTER — TELEPHONE (OUTPATIENT)
Dept: ORTHOPEDICS | Facility: CLINIC | Age: 65
End: 2020-03-02

## 2020-03-02 VITALS
SYSTOLIC BLOOD PRESSURE: 141 MMHG | HEIGHT: 65 IN | RESPIRATION RATE: 18 BRPM | WEIGHT: 198 LBS | HEART RATE: 104 BPM | BODY MASS INDEX: 32.99 KG/M2 | DIASTOLIC BLOOD PRESSURE: 90 MMHG

## 2020-03-02 DIAGNOSIS — M75.122 COMPLETE TEAR OF LEFT ROTATOR CUFF, UNSPECIFIED WHETHER TRAUMATIC: ICD-10-CM

## 2020-03-02 DIAGNOSIS — I50.32 CHRONIC DIASTOLIC HEART FAILURE: ICD-10-CM

## 2020-03-02 DIAGNOSIS — M54.9 BACK PAIN, UNSPECIFIED BACK LOCATION, UNSPECIFIED BACK PAIN LATERALITY, UNSPECIFIED CHRONICITY: Primary | ICD-10-CM

## 2020-03-02 DIAGNOSIS — I50.22 CHRONIC SYSTOLIC CONGESTIVE HEART FAILURE: ICD-10-CM

## 2020-03-02 DIAGNOSIS — M47.26 OSTEOARTHRITIS OF SPINE WITH RADICULOPATHY, LUMBAR REGION: ICD-10-CM

## 2020-03-02 DIAGNOSIS — M25.512 LEFT SHOULDER PAIN, UNSPECIFIED CHRONICITY: ICD-10-CM

## 2020-03-02 DIAGNOSIS — G89.29 OTHER CHRONIC PAIN: Primary | ICD-10-CM

## 2020-03-02 DIAGNOSIS — G89.29 CHRONIC LEFT SHOULDER PAIN: ICD-10-CM

## 2020-03-02 DIAGNOSIS — M54.16 LUMBAR RADICULITIS: Primary | ICD-10-CM

## 2020-03-02 DIAGNOSIS — M47.816 LUMBAR SPONDYLOSIS: ICD-10-CM

## 2020-03-02 DIAGNOSIS — M25.512 CHRONIC LEFT SHOULDER PAIN: ICD-10-CM

## 2020-03-02 DIAGNOSIS — I48.91 ATRIAL FIBRILLATION WITH RVR: ICD-10-CM

## 2020-03-02 LAB
POC ACTIVATED CLOTTING TIME K: 125 SEC (ref 74–137)
POC ACTIVATED CLOTTING TIME K: 268 SEC (ref 74–137)
POC ACTIVATED CLOTTING TIME K: 301 SEC (ref 74–137)
SAMPLE: ABNORMAL
SAMPLE: NORMAL

## 2020-03-02 PROCEDURE — 99999 PR PBB SHADOW E&M-EST. PATIENT-LVL V: CPT | Mod: PBBFAC,HCNC,, | Performed by: PHYSICIAN ASSISTANT

## 2020-03-02 PROCEDURE — 99214 PR OFFICE/OUTPT VISIT, EST, LEVL IV, 30-39 MIN: ICD-10-PCS | Mod: 25,HCNC,S$GLB, | Performed by: ORTHOPAEDIC SURGERY

## 2020-03-02 PROCEDURE — 99999 PR PBB SHADOW E&M-EST. PATIENT-LVL V: ICD-10-PCS | Mod: PBBFAC,HCNC,, | Performed by: PHYSICIAN ASSISTANT

## 2020-03-02 PROCEDURE — 3080F PR MOST RECENT DIASTOLIC BLOOD PRESSURE >= 90 MM HG: ICD-10-PCS | Mod: HCNC,CPTII,S$GLB, | Performed by: ORTHOPAEDIC SURGERY

## 2020-03-02 PROCEDURE — 3008F BODY MASS INDEX DOCD: CPT | Mod: HCNC,CPTII,S$GLB, | Performed by: PHYSICIAN ASSISTANT

## 2020-03-02 PROCEDURE — G0180 MD CERTIFICATION HHA PATIENT: HCPCS | Mod: ,,, | Performed by: FAMILY MEDICINE

## 2020-03-02 PROCEDURE — 3008F PR BODY MASS INDEX (BMI) DOCUMENTED: ICD-10-PCS | Mod: HCNC,CPTII,S$GLB, | Performed by: PHYSICIAN ASSISTANT

## 2020-03-02 PROCEDURE — 99214 PR OFFICE/OUTPT VISIT, EST, LEVL IV, 30-39 MIN: ICD-10-PCS | Mod: HCNC,S$GLB,, | Performed by: PHYSICIAN ASSISTANT

## 2020-03-02 PROCEDURE — 3080F PR MOST RECENT DIASTOLIC BLOOD PRESSURE >= 90 MM HG: ICD-10-PCS | Mod: HCNC,CPTII,S$GLB, | Performed by: PHYSICIAN ASSISTANT

## 2020-03-02 PROCEDURE — 3077F SYST BP >= 140 MM HG: CPT | Mod: HCNC,CPTII,S$GLB, | Performed by: ORTHOPAEDIC SURGERY

## 2020-03-02 PROCEDURE — 20610 PR DRAIN/INJECT LARGE JOINT/BURSA: ICD-10-PCS | Mod: HCNC,LT,S$GLB, | Performed by: ORTHOPAEDIC SURGERY

## 2020-03-02 PROCEDURE — 3080F DIAST BP >= 90 MM HG: CPT | Mod: HCNC,CPTII,S$GLB, | Performed by: PHYSICIAN ASSISTANT

## 2020-03-02 PROCEDURE — 3077F PR MOST RECENT SYSTOLIC BLOOD PRESSURE >= 140 MM HG: ICD-10-PCS | Mod: HCNC,CPTII,S$GLB, | Performed by: PHYSICIAN ASSISTANT

## 2020-03-02 PROCEDURE — 99999 PR PBB SHADOW E&M-EST. PATIENT-LVL II: ICD-10-PCS | Mod: PBBFAC,HCNC,, | Performed by: ORTHOPAEDIC SURGERY

## 2020-03-02 PROCEDURE — 99214 OFFICE O/P EST MOD 30 MIN: CPT | Mod: HCNC,S$GLB,, | Performed by: PHYSICIAN ASSISTANT

## 2020-03-02 PROCEDURE — 3077F PR MOST RECENT SYSTOLIC BLOOD PRESSURE >= 140 MM HG: ICD-10-PCS | Mod: HCNC,CPTII,S$GLB, | Performed by: ORTHOPAEDIC SURGERY

## 2020-03-02 PROCEDURE — 99214 OFFICE O/P EST MOD 30 MIN: CPT | Mod: 25,HCNC,S$GLB, | Performed by: ORTHOPAEDIC SURGERY

## 2020-03-02 PROCEDURE — 3077F SYST BP >= 140 MM HG: CPT | Mod: HCNC,CPTII,S$GLB, | Performed by: PHYSICIAN ASSISTANT

## 2020-03-02 PROCEDURE — G0180 PR HOME HEALTH MD CERTIFICATION: ICD-10-PCS | Mod: ,,, | Performed by: FAMILY MEDICINE

## 2020-03-02 PROCEDURE — 20610 DRAIN/INJ JOINT/BURSA W/O US: CPT | Mod: HCNC,LT,S$GLB, | Performed by: ORTHOPAEDIC SURGERY

## 2020-03-02 PROCEDURE — 99999 PR PBB SHADOW E&M-EST. PATIENT-LVL II: CPT | Mod: PBBFAC,HCNC,, | Performed by: ORTHOPAEDIC SURGERY

## 2020-03-02 PROCEDURE — 3080F DIAST BP >= 90 MM HG: CPT | Mod: HCNC,CPTII,S$GLB, | Performed by: ORTHOPAEDIC SURGERY

## 2020-03-02 RX ORDER — TRIAMCINOLONE ACETONIDE 40 MG/ML
40 INJECTION, SUSPENSION INTRA-ARTICULAR; INTRAMUSCULAR
Status: COMPLETED | OUTPATIENT
Start: 2020-03-02 | End: 2020-03-02

## 2020-03-02 RX ADMIN — TRIAMCINOLONE ACETONIDE 40 MG: 40 INJECTION, SUSPENSION INTRA-ARTICULAR; INTRAMUSCULAR at 11:03

## 2020-03-02 NOTE — TELEPHONE ENCOUNTER
Attempted to contact Pt  In regards to the next available appt. Pt not available, will try again at a later time.

## 2020-03-02 NOTE — PATIENT INSTRUCTIONS
If you are experiencing pain/discomfort or have questions after 5pm and would like to be connected to the [facility] on call team, please call this number [phone] and specify which Orthopedics/Sports Medicine provider is treating you.

## 2020-03-02 NOTE — TELEPHONE ENCOUNTER
----- Message from Lucille Campbell sent at 3/2/2020  2:55 PM CST -----  [facility]  Ms Lee needs to speak to nurse regarding patient /98 blood sugar 415 heart rate 114, states patient has not taken his meds for one week, stating he does not know what he is suppose to take. Please call her back at [phone]

## 2020-03-02 NOTE — PROGRESS NOTES
Subjective:     Patient ID: Crow Green is a 64 y.o. male.    Chief Complaint: Pain of the Left Shoulder    03/02/2020    Crow Green, a 64 y.o. MALE, returns today for LEFT shoulder pain. Had cardiac ablation last week.  Patient received a CSI at last visit and he states it gave him some relief.      12/20/2019  Crow Green, a 64 y.o. RHD male, returns today for evaluation of his LEFT shoulder and MRI results. He reports worsening pain and stiffness. He uses a rolator scooter and reports pain when using.    12/02/19  Crow Green, a 64 y.o. RHD male, presents today for evaluation of his LEFT shoulder. About  2 weeks ago he was getting out of his bed when he fell, hitting his left side. Denies numbness and radiating pain. Reports anterior shoulder burning and tingling. Pain with ADLs that require lifting and raising arm above 90. No prior trauma to left upper extremity. No history of cervical spine problems.     Shoulder Pain    The pain is present in the left shoulder. This is a chronic problem. The current episode started more than 1 month ago. There has been a history of trauma. The injury was the result of a falling action while at home. The problem occurs constantly. The problem has been unchanged. The quality of the pain is described as tingling, burning, aching and sharp. The pain is at a severity of 7/10. Associated symptoms include a limited range of motion and stiffness. Pertinent negatives include no fever or numbness. The symptoms are aggravated by activity, bearing weight, flexion, extension, lifting, lying down, twisting and contact. He has tried injection treatment, heat and NSAIDs for the symptoms. The treatment provided no relief. Physical therapy: patient states he only went to 1 visit.      Past Medical History:   Diagnosis Date    Acute kidney injury     Arthritis     Asthma     Atrial fibrillation     Atrial fibrillation with RVR 8/7/2019    CHF  (congestive heart failure)     Depression     Diabetes mellitus, type 2 Diagnosed in 2000    Elevated PSA     Hypertension     Sleep apnea      Past Surgical History:   Procedure Laterality Date    CHOLECYSTECTOMY       Family History   Problem Relation Age of Onset    Glaucoma Mother     Cataracts Mother     Cataracts Father     Glaucoma Sister     Cataracts Sister     Glaucoma Maternal Aunt     No Known Problems Brother     No Known Problems Daughter     No Known Problems Son     Prostate cancer Neg Hx      Social History     Socioeconomic History    Marital status: Legally      Spouse name: Not on file    Number of children: 5    Years of education: Not on file    Highest education level: Not on file   Occupational History    Occupation: disabled   Social Needs    Financial resource strain: Not on file    Food insecurity:     Worry: Not on file     Inability: Not on file    Transportation needs:     Medical: Not on file     Non-medical: Not on file   Tobacco Use    Smoking status: Never Smoker    Smokeless tobacco: Never Used   Substance and Sexual Activity    Alcohol use: No    Drug use: No    Sexual activity: Yes     Partners: Female   Lifestyle    Physical activity:     Days per week: Not on file     Minutes per session: Not on file    Stress: Rather much   Relationships    Social connections:     Talks on phone: Not on file     Gets together: Not on file     Attends Confucianist service: Not on file     Active member of club or organization: Not on file     Attends meetings of clubs or organizations: Not on file     Relationship status: Not on file   Other Topics Concern    Not on file   Social History Narrative    Not on file     Medication List with Changes/Refills   Current Medications    ALCOHOL ANTISEPTIC PADS (ALCOHOL PREP PADS TOP)        AMLODIPINE (NORVASC) 10 MG TABLET    Take 10 mg by mouth once daily.     ASPIRIN (ECOTRIN) 81 MG EC TABLET    Take 1 tablet  (81 mg total) by mouth once daily.    ATORVASTATIN (LIPITOR) 40 MG TABLET    TAKE 1 TABLET EVERY EVENING    AZATHIOPRINE (IMURAN) 50 MG TAB    Take 1 tablet (50 mg total) by mouth 2 (two) times daily.    BACLOFEN (LIORESAL) 10 MG TABLET    Take 1 tablet (10 mg total) by mouth once daily.    BLOOD SUGAR DIAGNOSTIC (TRUE METRIX GLUCOSE TEST STRIP) STRP    Use with blood glucose meter kit to test blood sugar four times  daily    BLOOD-GLUCOSE METER (TRUE METRIX AIR GLUCOSE METER) KIT    TEST FOUR TIMES DAILY BEFORE MEALS  AND EVERY NIGHT    DILTIAZEM (CARDIZEM SR) 60 MG CP12    Take 1 capsule (60 mg total) by mouth 2 (two) times daily.    ELIQUIS 5 MG TAB    Take 5 mg by mouth 2 (two) times daily.     FLUTICASONE-SALMETEROL 250-50 MCG/DOSE (ADVAIR DISKUS) 250-50 MCG/DOSE DISKUS INHALER    Inhale 1 puff into the lungs 2 (two) times daily. Controller    FOOD SUPPLEMT, LACTOSE-REDUCED (ENSURE) LIQD    Take 118 mLs by mouth 3 (three) times daily with meals.    FUROSEMIDE (LASIX) 40 MG TABLET    TAKE 2 TABLETS (80 MG TOTAL) EVERY MORNING AND 1 TABLET (40 MG TOTAL) EVERY EVENING.    GABAPENTIN (NEURONTIN) 800 MG TABLET    Take 1 tablet (800 mg total) by mouth 3 (three) times daily.    GLIMEPIRIDE (AMARYL) 2 MG TABLET    TAKE 1 TABLET (2 MG TOTAL) BY MOUTH BEFORE BREAKFAST.    INHALATION SPACING DEVICE    Use as directed for inhalation.    INSULIN ASPART U-100 (NOVOLOG FLEXPEN U-100 INSULIN) 100 UNIT/ML (3 ML) INPN PEN    INJECT 20 UNITS SUBCUTANEOUSLY THREE TIMES DAILY WITH MEALS    INSULIN ASPART U-100 (NOVOLOG) 100 UNIT/ML (3 ML) INPN PEN    Inject 10 Units into the skin.    INSULIN SYRINGE-NEEDLE U-100 1 ML 31 GAUGE X 5/16 SYRG        IPRATROPIUM (ATROVENT) 0.02 % NEBULIZER SOLUTION    USE 1 VIAL VIA NEBULIZER FOUR TIMES DAILY    ISOSORBIDE MONONITRATE (IMDUR) 60 MG 24 HR TABLET    Take 1 tablet (60 mg total) by mouth once daily.    KETOROLAC (TORADOL) 10 MG TABLET    Take 1 tablet (10 mg total) by mouth every 8 (eight)  "hours as needed for Pain.    LANCETS 32 GAUGE MISC    1 lancet by Misc.(Non-Drug; Combo Route) route 2 (two) times daily.    LANTUS SOLOSTAR U-100 INSULIN GLARGINE 100 UNITS/ML (3ML) SUBQ PEN    INJECT 80 UNITS SUBCUTANEOUSLY EVERY EVENING    LATANOPROST 0.005 % OPHTHALMIC SOLUTION    INSTILL 1 DROP INTO BOTH EYES EVERY EVENING    LISINOPRIL 10 MG TABLET    Take 1 tablet (10 mg total) by mouth once daily.    METFORMIN (GLUCOPHAGE) 1000 MG TABLET    TAKE 1 TABLET (1,000 MG TOTAL) BY MOUTH 2 (TWO) TIMES DAILY WITH MEALS.    METOLAZONE (ZAROXOLYN) 2.5 MG TABLET    Take 1 tablet (2.5 mg total) by mouth every Mon, Wed, Fri.    METOLAZONE (ZAROXOLYN) 5 MG TABLET    Take 2.5 mg by mouth.    METOPROLOL SUCCINATE (TOPROL-XL) 50 MG 24 HR TABLET    Take 1 tablet (50 mg total) by mouth once daily.    NITROGLYCERIN (NITROSTAT) 0.3 MG SL TABLET    PLACE 1 TABLET UNDER THE TONGUE EVERY 5 MINUTES AS NEEDED FOR CHEST PAIN, NO MORE THAN 3 TABLETS IN ONE DAY    PANTOPRAZOLE (PROTONIX) 40 MG TABLET    Take 1 tablet (40 mg total) by mouth once daily.    PEN NEEDLE, DIABETIC (BD ULTRA-FINE SHORT PEN NEEDLE) 31 GAUGE X 5/16" NDLE    Inject 1 each into the skin 3 (three) times daily.    SEMAGLUTIDE (OZEMPIC) 0.25 MG OR 0.5 MG(2 MG/1.5 ML) PNIJ    Inject 1 mg into the skin once a week.    SERTRALINE (ZOLOFT) 100 MG TABLET    Take 100 mg by mouth.    SERTRALINE (ZOLOFT) 50 MG TABLET    TAKE 1 TABLET ONE TIME DAILY    TAMSULOSIN (FLOMAX) 0.4 MG CAP    Take 1 capsule (0.4 mg total) by mouth once daily.    TIOTROPIUM (SPIRIVA WITH HANDIHALER) 18 MCG INHALATION CAPSULE    Inhale 1 capsule (18 mcg total) into the lungs once daily. Controller    TRAZODONE (DESYREL) 100 MG TABLET    Take 200 mg by mouth.    TRAZODONE (DESYREL) 150 MG TABLET    TAKE 1 TABLET EVERY NIGHT    [employer] AEROSOL DELIVERY SYSTEM VIN    USE AS DIRESTED     Review of patient's allergies indicates:   Allergen Reactions    Protamine Hives     Urticaria, possible upper airway " swelling     Review of Systems   Constitution: Negative for chills and fever.   HENT: Negative for ear discharge and hearing loss.    Eyes: Negative for blurred vision and visual disturbance.   Cardiovascular: Negative for chest pain and leg swelling.   Respiratory: Negative for cough and shortness of breath.    Endocrine: Negative for polyuria.   Hematologic/Lymphatic: Negative for bleeding problem.   Skin: Negative for rash.   Musculoskeletal: Positive for joint pain, muscle cramps, muscle weakness and stiffness. Negative for back pain and joint swelling.   Gastrointestinal: Negative for nausea and vomiting.   Genitourinary: Negative for hematuria.   Neurological: Positive for loss of balance. Negative for numbness and paresthesias.   Psychiatric/Behavioral: Negative for altered mental status.       Objective:   There is no height or weight on file to calculate BMI.  There were no vitals filed for this visit.             General    Vitals reviewed.  Constitutional: He is oriented to person, place, and time. He appears well-developed and well-nourished. No distress.   HENT:   Head: Atraumatic.   Nose: Nose normal.   Eyes: EOM are normal. Right eye exhibits no discharge. Left eye exhibits no discharge.   Neck: Neck supple.   Cardiovascular: Normal rate and intact distal pulses.    Pulmonary/Chest: Effort normal. No respiratory distress.   Neurological: He is alert and oriented to person, place, and time. Coordination normal.   Psychiatric: He has a normal mood and affect. His behavior is normal. Judgment and thought content normal.         Right Shoulder Exam     Inspection/Observation   Swelling: absent  Bruising: absent  Scars: absent  Deformity: absent  Scapular Winging: absent  Scapular Dyskinesia: negative  Atrophy: absent    Tenderness   The patient is experiencing no tenderness.    Range of Motion   Active abduction: 90   Passive abduction: 100   Extension: 0   Forward Flexion: 160   Forward Elevation:  160Adduction: 40   External Rotation 0 degrees: 50   Internal rotation 0 degrees: T8     Tests & Signs   Drop arm: negative  Arevalo test: negative  Impingement: negative  Lift Off Sign: negative  Active Compression Test ([place]'s Sign): negative  Speed's Test: negative    Other   Sensation: normal    Left Shoulder Exam     Inspection/Observation   Swelling: absent  Bruising: absent  Scars: absent  Deformity: absent  Scapular Winging: absent  Scapular Dyskinesia: negative  Atrophy: absent    Tenderness   The patient is tender to palpation of the acromioclavicular joint, biceps tendon and supraspinatus.    Range of Motion   Active abduction: 90   Passive abduction: 100   Extension: 0   Forward Flexion: 120   Forward Elevation: 160Adduction: 40   External Rotation 0 degrees: 40   Internal rotation 0 degrees: L5     Tests & Signs   Drop arm: negative  Arevalo test: positive  Impingement: positive  Rotator Cuff Painful Arc/Range: severe  Lift Off Sign: positive  Active Compression Test ([place]'s Sign): positive  Speed's Test: positive  Bear Hug: negative    Other   Sensation: normal       Muscle Strength   Right Upper Extremity   Shoulder Abduction: 5/5   Shoulder Internal Rotation: 5/5   Shoulder External Rotation: 5/5   Supraspinatus: 5/5/5   Subscapularis: 5/5/5   Biceps: 5/5/5   Left Upper Extremity  Shoulder Abduction: 4/5   Shoulder Internal Rotation: 4/5   Shoulder External Rotation: 4/5   Supraspinatus: 3/5/5   Subscapularis: 4/5/5   Biceps: 4/5/5     Reflexes     Left Side  Biceps:  2+  Triceps:  2+    Right Side   Biceps:  2+  Triceps:  2+    Vascular Exam     Right Pulses      Radial:                    2+      Left Pulses      Radial:                    2+      Capillary Refill  Right Hand: normal capillary refill  Left Hand: normal capillary refill      Relevant imaging results reviewed and interpreted by me, discussed with the patient and / or family today     Reading Physician Reading Date Result  Priority   Jame Stephens MD 12/2/2019 Routine      Narrative     EXAMINATION:  XR SHOULDER COMPLETE 2 OR MORE VIEWS LEFT    CLINICAL HISTORY:  Pain in left shoulder    TECHNIQUE:  Two or three views of the left shoulder were performed.    COMPARISON:  None    FINDINGS:  There is no radiographic evidence of acute osseous, articular, or soft tissue abnormality.  There are mild degenerative changes present at the AC and glenohumeral joints.      Impression       As above.  No acute findings.      Electronically signed by: Jame Stephens MD  Date: 12/02/2019  Time: 10:22        Reading Physician Reading Date Result Priority   Jame Stephens MD 12/16/2019 Routine      Narrative     EXAMINATION:  MRI SHOULDER WITHOUT CONTRAST LEFT    CLINICAL HISTORY:  left shoulder pain;  Pain in left shoulder    TECHNIQUE:  Multiplanar/multisequence MRI of the left shoulder was performed without the use of intravenous or intra-articular gadolinium.    COMPARISON:  Radiographs dated 12/02/2019    FINDINGS:  Rotator cuff: There is a massive full-thickness rotator cuff tear involving the full widths of the supraspinatus and infraspinatus tendons spanning approximately 6 cm AP with retraction to the level of the glenoid.  There is moderate tendinosis of the subscapularis tendon with no definite tear visualized.  The teres minor tendon appears intact.  Fatty atrophy of the supraspinatus and infraspinatus muscle bellies is noted.    Labrum: Global degenerative changes noted.    Long head of the biceps: Intact    Bones: No evidence of fracture or marrow replacement process.    AC joint: Within normal limits.    Cartilage: No large glenohumeral articular cartilage defect demonstrated on this non arthrographic study.    Miscellaneous: Mild arthrosis      Impression       Massive rotator cuff tear as above with associated atrophy of the supraspinatus and infraspinatus muscle bellies.    Moderate tendinosis of the  subscapularis.      Electronically signed by: Jame Stephens MD  Date: 12/16/2019  Time: 08:12       Assessment:     Encounter Diagnoses   Name Primary?    Left shoulder pain, unspecified chronicity     Osteoarthritis of spine with radiculopathy, lumbar region     Other chronic pain Yes    Complete tear of left rotator cuff, unspecified whether traumatic     Atrial fibrillation with RVR     Chronic diastolic heart failure     Chronic systolic congestive heart failure         Plan:     Reviewed imaging findings with the patient today.  He has multiple medical comorbidities, just underwent cardiac ablation, not interested in surgery at this time  Will continue conservative treatment  Shoulder injection today  PT/OT  Neurosurgery for cervical lumbar radiculopathies  External pain management for prescription pain medication  F/up 3-4mo.  Discussed reverse shoulder replacement versus arthroscopic debridement possible rotator cuff repair/partial repair as an option if medically optimized    Continue cardiac management per Cardiology-has follow-up status post ablation soon    PROCEDURE NOTE:  After time out was performed, including verification of patient ID, procedure, site and side, availability of information and equipment, review of safety issues, and agreement with consent, the procedure site was marked and the patient was prepped aseptically.  The shoulder was prepped with betadyne and alcohol. The left subacromial space was injected with 1 cc 40 mg kenalog and 2 cc lidocaine and 2 cc .5% marcaine.   Bandaid was applied. Patient was reminded to rest with RICE for 48 hours post injection and to call clinic immediately for any adverse side effects as explained in clinic today.         Disclaimer: This note was prepared using a voice recognition system and is likely to have sound alike errors within the text.

## 2020-03-02 NOTE — TELEPHONE ENCOUNTER
Please advise.  nurse also stated pt has not taken medication for a week due to not knowing what medications to take. Was advised that she has filled pt med case with the meds that he has for the next two weeks. Pt has numerous of extra pill bottles in home. She is unclear on home much trazodone pt should be taking. The has 100 mg taken 2 tablets every evening.   Please advise

## 2020-03-03 NOTE — PROGRESS NOTES
Chief Pain Complaint:  Low-back Pain and Shoulder Pain    Interval History (3/3/2020): Patient was seen on 3/7/19 (about a year ago). At that visit, plan was to schedule another PITO.  Patient reports he did not have that procedure because he has to set up transportation prior, and he often forgets about future appointments.  He saw Dr. Zavaleta previously for left shoulder pain.  He has had an MRI revealing full thickness RC tear, but he has not moved forward with planning surgical intervention. This shoulder pain seems to be main presenting pain problem today. He had a CSI on 12/20/19 with some relief with ortho Dr. Zavaleta.     Interval History (3/7/19):  Patient was last seen on 7-19-18 with Dr. Tsai. At that visit, the plan was to move forward with PITO to help with left leg pain.  The injection had been canceled and rescheduled more than once.  He now states that pain is in both legs, where before it was just the left leg.     History of Present Illness:   Crow Green is a 64 y.o. male  who is presenting with a chief complaint of Low-back Pain. The patient began experiencing this problem insidiously, and the pain has been gradually worsening over the past 6 month(s). The pain is described as throbbing, shooting, burning and electrical and is located in the bilateral lumbar spine. Pain is intermittent and lasts hours. The pain radiates to bilateral lower extremities. The patient rates his pain a 3 out of ten and interferes with activities of daily living a 8 out of ten. Pain is exacerbated by flexion of the lumbar spine, and is improved by rest. Patient reports no prior trauma, no prior spinal surgery     - pertinent negatives: No fever, No chills, No weight loss, No bladder dysfunction, No bowel dysfunction, No saddle anesthesia  - pertinent positives: right leg weakness    - medications, other therapies tried (physical therapy, injections):     >> NSAIDs, Tylenol, Norco, gabapentin and flexeril    >>  Has previously undergone Physical Therapy    >> Has NOT previously undergone spinal injection/s      Imaging / Labs / Studies (reviewed on 3/3/2020):    Results for orders placed during the hospital encounter of 12/02/19   X-Ray Shoulder 2 or More Views Left    Narrative COMPARISON:  None  FINDINGS:  There is no radiographic evidence of acute osseous, articular, or soft tissue abnormality.  There are mild degenerative changes present at the AC and glenohumeral joints.        12/16/2019 MRI SHOULDER WITHOUT CONTRAST LEFT  COMPARISON:  Radiographs dated 12/02/2019  FINDINGS:  Rotator cuff: There is a massive full-thickness rotator cuff tear involving the full widths of the supraspinatus and infraspinatus tendons spanning approximately 6 cm AP with retraction to the level of the glenoid.  There is moderate tendinosis of the subscapularis tendon with no definite tear visualized.  The teres minor tendon appears intact.  Fatty atrophy of the supraspinatus and infraspinatus muscle bellies is noted.  Labrum: Global degenerative changes noted.  Long head of the biceps: Intact  Bones: No evidence of fracture or marrow replacement process.  AC joint: Within normal limits.  Cartilage: No large glenohumeral articular cartilage defect demonstrated on this non arthrographic study.  Miscellaneous: Mild arthrosis  Impression:  Massive rotator cuff tear as above with associated atrophy of the supraspinatus and infraspinatus muscle bellies.  Moderate tendinosis of the subscapularis.     Results for orders placed during the hospital encounter of 03/06/18   MRI Lumbar Spine Without Contrast    Narrative TECHNIQUE:  Multiplanar, multisequence MR images were acquired from the thoracolumbar junction to the sacrum without the administration of contrast.  COMPARISON:  None.  FINDINGS:  Vertebral body heights alignment are within normal limits.  There is moderate disc height loss at L5-S1.  Mild disc height loss also noted and L1-2.  There is  mild disc desiccation at L3-4 and L4-5. marrow signal is within normal limits with no evidence of fracture or marrow replacement process.  Conus appears within normal limits terminating at the level of the L1 level.  Cauda equina appears unremarkable  Paraspinous soft tissues appear within normal limits.  T12-L1:   No spinal canal or neuroforaminal narrowing.  L1-2: Small posterior central disc extrusion with superior migration.  No spinal canal or neuroforaminal narrowing.  L2-3:  No spinal canal or neuroforaminal narrowing.  L3-4:  No spinal canal or neuroforaminal narrowing.  L4-5: Mild generalized disc bulge and mild bilateral facet arthropathy.  Moderate left and mild right neuroforaminal narrowing.  No spinal canal stenosis.  L5-S1: Large central/left paracentral disc extrusion with slight inferior migration.  Moderate bilateral facet arthropathy.  Disc material contacts and displaces the descending left S1 nerve root.  Moderate to severe bilateral neural foraminal narrowing.    Impression 1. Posterior central/left paracentral disc extrusion at L5-S1 with mass effect on the descending left S1 nerve root and moderate to severe bilateral neural foraminal narrowing.  2. Other multilevel degenerative disc disease as above.       Results for orders placed during the hospital encounter of 02/10/18   X-Ray Lumbar Spine Ap And Lateral    Narrative Lumbar spine, 5 views  Clinical History:   Pain in the low back.  Findings: The vertebral body heights and alignment are within normal limits. No acute fracture or subluxation.There is bilateral facet joint osteoarthritis at L4-L5 and L5-S1.  There is a very subtle dextroscoliosis of lumbar spine centered at L2-L3.    Impression      No acute fracture or subluxation. Bilateral facet joint osteoarthritis at L4-L5 and L5-S1.         Review of Systems:  CONSTITUTIONAL: patient denies any fever, chills, or weight loss  SKIN: patient denies any rash or itching  RESPIRATORY:  "patient denies having any shortness of breath  GASTROINTESTINAL: patient denies having any diarrhea, constipation, or bowel incontinence  GENITOURINARY: patient denies having any abnormal bladder function    MUSCULOSKELETAL:  - patient complains of the above noted pain/s (see chief pain complaint)    NEUROLOGICAL:   - pain as above  - strength in Lower extremities is decreased, on the RIGHT  - sensation in Lower extremities is intact, BILATERALLY  - patient denies any loss of bowel or bladder control      PSYCHIATRIC: patient denies any change in mood    Other:  All other systems reviewed and are negative      Physical Exam:  Vitals:  BP (!) 141/90 (BP Location: Right arm, Patient Position: Sitting, BP Method: Medium (Automatic))   Pulse 104   Resp 18   Ht 5' 5" (1.651 m)   Wt 89.8 kg (198 lb)   BMI 32.95 kg/m²   (reviewed on 3/3/2020)    General: alert and oriented, in no apparent distress.  Gait: antalgic gait.  Skin: no rashes, no discoloration, no obvious lesions  HEENT: normocephalic, atraumatic. Pupils equal and round.  Cardiovascular: no significant peripheral edema present.  Respiratory: without use of accessory muscles of respiration.    Musculoskeletal - Lumbar Spine:  - Pain on flexion of lumbar spine: Present  - Pain on extension of lumbar spine: Present  - Lumbar facet loading: Absent   - TTP over the lumbar facet joints: Absent  - TTP over the lumbar paraspinals: Present  - TTP over the SI joints:  Absent   - TTP over GT bursa: Absent   - Straight Leg Raise: Present    Neuro - Lower Extremities:  - RLE Strength:     >> 5/5 strength with right hip flexion/ extension    >> 5/5 strength with right knee flexion/ extension    >> 5/5 strength in right ankle with plantar and dorsiflexion  - LLE Strength:     >> 5/5 strength with left hip flexion/ extension    >> 5/5 strength with knee flexion extension on the left     >> 5/5 strength in left ankle with plantar and dorsiflexion  - BLE Strength: R/L: HF: " 5/5, HE: 5/5, KF: 5/5; KE: 5/5; FE: 5/5; FF: 5/5  - Extremity Reflexes: Brisk and symmetric throughout  - Sensory: Sensation to light touch intact bilaterally      Psych:  Mood and affect is appropriate          Assessment:  Crow Green is a 64 y.o. year old male who is presenting with    Encounter Diagnoses   Name Primary?    Lumbar radiculitis Yes    Lumbar spondylosis     Chronic left shoulder pain     Complete tear of left rotator cuff, unspecified whether traumatic        Plan:  1. Interventional:   - Will hold off on rescheduling patient for Left L4-5, L5-S1 TFESI. Patient has scheduled and canceled procedure multiple times.  I am unsure if he actually wants to move forward with this.   - Patient will also follow up with Dr. Chandra soon.  He has seen their department once and had x-rays. He is scheduled for lumbar MRI tomorrow 3/3.     2. Pharmacologic:   - Continue Gabapentin 800 mm Po TID and baclofen 10mg PRN.   - Patient has previously requested refills of Norco.  We will not continue opioid therapy in the future.  He understands.  He was given a list of other providers in the area last visit.     3. Rehabilitative: Encouraged regular exercise.    4. Diagnostic: None for now.    5. Follow up: PRN.    - I discussed the risks, benefits, and alternatives to potential treatment options. All questions and concerns were fully addressed today in clinic.

## 2020-03-03 NOTE — TELEPHONE ENCOUNTER
Spoke with DONNA chung per Dr. Lambert about trazodone medication. Advised pt can take up to 200 mg. HH will contact Dr. Zhao office about heart/htn medications

## 2020-03-04 ENCOUNTER — TELEPHONE (OUTPATIENT)
Dept: PODIATRY | Facility: CLINIC | Age: 65
End: 2020-03-04

## 2020-03-04 ENCOUNTER — TELEPHONE (OUTPATIENT)
Dept: ELECTROPHYSIOLOGY | Facility: CLINIC | Age: 65
End: 2020-03-04

## 2020-03-04 NOTE — TELEPHONE ENCOUNTER
I have scheduled pt f/u appt , post procedure done on 02/27 ,   It is scheduled with  in [place] on 05/13/2020 for 8:40am

## 2020-03-05 ENCOUNTER — TELEPHONE (OUTPATIENT)
Dept: RHEUMATOLOGY | Facility: CLINIC | Age: 65
End: 2020-03-05

## 2020-03-05 ENCOUNTER — PATIENT OUTREACH (OUTPATIENT)
Dept: ADMINISTRATIVE | Facility: OTHER | Age: 65
End: 2020-03-05

## 2020-03-05 ENCOUNTER — TELEPHONE (OUTPATIENT)
Dept: ORTHOPEDICS | Facility: CLINIC | Age: 65
End: 2020-03-05

## 2020-03-05 DIAGNOSIS — M54.9 BACK PAIN, UNSPECIFIED BACK LOCATION, UNSPECIFIED BACK PAIN LATERALITY, UNSPECIFIED CHRONICITY: ICD-10-CM

## 2020-03-05 DIAGNOSIS — M54.12 CERVICAL RADICULOPATHY: ICD-10-CM

## 2020-03-05 DIAGNOSIS — M25.512 LEFT SHOULDER PAIN, UNSPECIFIED CHRONICITY: ICD-10-CM

## 2020-03-05 DIAGNOSIS — M75.122 COMPLETE TEAR OF LEFT ROTATOR CUFF, UNSPECIFIED WHETHER TRAUMATIC: Primary | ICD-10-CM

## 2020-03-05 NOTE — TELEPHONE ENCOUNTER
Tried to reach pt at numbers listed so can remind him of upcoming appt & to arrange for transportation. Person listed at home number states not available at that number & mobile number listed not a working number. Will mail reminder letter today.

## 2020-03-05 NOTE — TELEPHONE ENCOUNTER
----- Message from Mague Marie LPN sent at 2/11/2020  3:14 PM CST -----  Call pt to remind him of  3/11/20 appt so can arrange transportation

## 2020-03-06 ENCOUNTER — TELEPHONE (OUTPATIENT)
Dept: PODIATRY | Facility: CLINIC | Age: 65
End: 2020-03-06

## 2020-03-06 NOTE — TELEPHONE ENCOUNTER
Called to reschedule patient's Podiatry appointment on Monday, March 9, 2020 due to clinic bookout. There was no answer. I left a voice message requesting a call back.

## 2020-03-09 ENCOUNTER — NURSE TRIAGE (OUTPATIENT)
Dept: ADMINISTRATIVE | Facility: CLINIC | Age: 65
End: 2020-03-09

## 2020-03-09 DIAGNOSIS — E11.3299 TYPE 2 DIABETES MELLITUS WITH MILD NONPROLIFERATIVE RETINOPATHY, WITH LONG-TERM CURRENT USE OF INSULIN, MACULAR EDEMA PRESENCE UNSPECIFIED, UNSPECIFIED LATERALITY: ICD-10-CM

## 2020-03-09 DIAGNOSIS — Z79.4 TYPE 2 DIABETES MELLITUS WITH MILD NONPROLIFERATIVE RETINOPATHY, WITH LONG-TERM CURRENT USE OF INSULIN, MACULAR EDEMA PRESENCE UNSPECIFIED, UNSPECIFIED LATERALITY: ICD-10-CM

## 2020-03-09 NOTE — TELEPHONE ENCOUNTER
Spoke with  nurse. Weight gain 10 pounds in 1 weeks, SOB, crackles right lung. Pt was advised to go to ER and pt refused. Gemma and I tried calling pt to get him to go to ER no answer.  nurse states  was at the home as well and they were going to call EPS. Very non compliant per HH nurse. Not taking meds and not checking blood sugar.

## 2020-03-09 NOTE — TELEPHONE ENCOUNTER
SILVIA Machuca with [facility] in , called to say she is at Crow's home now.  He has had a 10# weight gain since his last weigh in.  Today is 195 #, was 185 # last Thursday.  She states he is SOB even at rest, and she hears crackles in his right lung; he sounds very wet, per Brigette.  BP is 160/90, temp 97.3.  His CBG (as told to Brigette by Crow) today is 175.  Brigette states last week was 415; she is not certain how often he checks. O2 sats on RA 97%.  Hx AF RVR, CHF, DM. Recommend ED now for Crow to be evaluated.  Brigette states he refuses.  He does have family there with him; encouraged her to discuss with patient and family present, and strongly encourage ED for evaluation.  Message to Mateo Lambert DO, pcp.  Please contact DONNA Machuca nurse at [phone] directly with any additional care advice.       Reason for Disposition   MODERATE difficulty breathing (e.g., speaks in phrases, SOB even at rest, pulse 100-120) of new onset or worse than normal    Additional Information   Negative: Breathing stopped and hasn't returned   Negative: Choking on something   Negative: SEVERE difficulty breathing (e.g., struggling for each breath, speaks in single words, pulse > 120)   Negative: Bluish lips, tongue, or face now   Negative: Difficult to awaken or acting confused (e.g., disoriented, slurred speech)   Negative: Passed out (i.e., fainted, collapsed and was not responding)   Negative: Wheezing started suddenly after medicine, an allergic food, or bee sting   Negative: Stridor   Negative: Slow, shallow and weak breathing   Negative: Sounds like a life-threatening emergency to the triager   Negative: Chest pain   Negative: Wheezing (high pitched whistling sound) and previous asthma attacks or use of asthma medicines   Negative: Difficulty breathing and only present when coughing   Negative: Difficulty breathing and only from stuffy or runny nose    Protocols used: ST BREATHING  DIFFICULTY-A-OH

## 2020-03-10 ENCOUNTER — TELEPHONE (OUTPATIENT)
Dept: RHEUMATOLOGY | Facility: CLINIC | Age: 65
End: 2020-03-10

## 2020-03-10 RX ORDER — INSULIN ASPART 100 [IU]/ML
INJECTION, SOLUTION INTRAVENOUS; SUBCUTANEOUS
Qty: 15 ML | Refills: 0 | Status: SHIPPED | OUTPATIENT
Start: 2020-03-10 | End: 2020-03-23 | Stop reason: SDUPTHER

## 2020-03-10 NOTE — TELEPHONE ENCOUNTER
Spoke with pt this morning to see how he is feeling. pt BS is 224. Pt stated that he is not having SOB today. Pt stated that he was very agitated with HH yesterday. He is coughing up a lot of mucus, pt was coughing a lot a lot on the phone. Pt OT is at the home now, pt BP is 140/85. Pt stated he need refill on novolog. Pt will call he gets any worse

## 2020-03-11 ENCOUNTER — HOSPITAL ENCOUNTER (EMERGENCY)
Facility: HOSPITAL | Age: 65
Discharge: HOME | End: 2020-03-11
Attending: EMERGENCY MEDICINE
Payer: MEDICARE

## 2020-03-11 ENCOUNTER — DOCUMENTATION ONLY (OUTPATIENT)
Dept: RHEUMATOLOGY | Facility: CLINIC | Age: 65
End: 2020-03-11

## 2020-03-11 ENCOUNTER — EXTERNAL HOME HEALTH (OUTPATIENT)
Dept: HOME HEALTH SERVICES | Facility: HOSPITAL | Age: 65
End: 2020-03-11
Payer: MEDICARE

## 2020-03-11 ENCOUNTER — TELEPHONE (OUTPATIENT)
Dept: INTERNAL MEDICINE | Facility: CLINIC | Age: 65
End: 2020-03-11

## 2020-03-11 ENCOUNTER — OFFICE VISIT (OUTPATIENT)
Dept: RHEUMATOLOGY | Facility: CLINIC | Age: 65
End: 2020-03-11
Payer: MEDICARE

## 2020-03-11 VITALS
TEMPERATURE: 98 F | HEART RATE: 92 BPM | DIASTOLIC BLOOD PRESSURE: 70 MMHG | RESPIRATION RATE: 18 BRPM | BODY MASS INDEX: 31.95 KG/M2 | WEIGHT: 192 LBS | SYSTOLIC BLOOD PRESSURE: 114 MMHG | OXYGEN SATURATION: 96 %

## 2020-03-11 VITALS — WEIGHT: 192.69 LBS | BODY MASS INDEX: 32.06 KG/M2

## 2020-03-11 DIAGNOSIS — M33.13 DERMATOMYOSITIS: Primary | ICD-10-CM

## 2020-03-11 DIAGNOSIS — E66.9 OBESITY (BMI 30.0-34.9): ICD-10-CM

## 2020-03-11 DIAGNOSIS — I48.0 PAF (PAROXYSMAL ATRIAL FIBRILLATION): ICD-10-CM

## 2020-03-11 DIAGNOSIS — I50.22 CHRONIC SYSTOLIC CONGESTIVE HEART FAILURE: ICD-10-CM

## 2020-03-11 DIAGNOSIS — R05.9 COUGH: ICD-10-CM

## 2020-03-11 DIAGNOSIS — J40 BRONCHITIS: Primary | ICD-10-CM

## 2020-03-11 DIAGNOSIS — N17.9 ACUTE RENAL FAILURE, UNSPECIFIED ACUTE RENAL FAILURE TYPE: ICD-10-CM

## 2020-03-11 DIAGNOSIS — N28.9 RENAL INSUFFICIENCY: ICD-10-CM

## 2020-03-11 DIAGNOSIS — M33.13 DERMATOMYOSITIS: ICD-10-CM

## 2020-03-11 DIAGNOSIS — R06.02 SOB (SHORTNESS OF BREATH): ICD-10-CM

## 2020-03-11 PROCEDURE — 3008F PR BODY MASS INDEX (BMI) DOCUMENTED: ICD-10-PCS | Mod: HCNC,CPTII,S$GLB, | Performed by: INTERNAL MEDICINE

## 2020-03-11 PROCEDURE — 99215 OFFICE O/P EST HI 40 MIN: CPT | Mod: HCNC,S$GLB,, | Performed by: INTERNAL MEDICINE

## 2020-03-11 PROCEDURE — 99999 PR PBB SHADOW E&M-EST. PATIENT-LVL II: ICD-10-PCS | Mod: PBBFAC,HCNC,, | Performed by: INTERNAL MEDICINE

## 2020-03-11 PROCEDURE — 99283 EMERGENCY DEPT VISIT LOW MDM: CPT | Mod: 25,HCNC

## 2020-03-11 PROCEDURE — 99499 RISK ADDL DX/OHS AUDIT: ICD-10-PCS | Mod: HCNC,S$GLB,, | Performed by: INTERNAL MEDICINE

## 2020-03-11 PROCEDURE — 3008F BODY MASS INDEX DOCD: CPT | Mod: HCNC,CPTII,S$GLB, | Performed by: INTERNAL MEDICINE

## 2020-03-11 PROCEDURE — 99215 PR OFFICE/OUTPT VISIT, EST, LEVL V, 40-54 MIN: ICD-10-PCS | Mod: HCNC,S$GLB,, | Performed by: INTERNAL MEDICINE

## 2020-03-11 PROCEDURE — 99499 UNLISTED E&M SERVICE: CPT | Mod: HCNC,S$GLB,, | Performed by: INTERNAL MEDICINE

## 2020-03-11 PROCEDURE — 99999 PR PBB SHADOW E&M-EST. PATIENT-LVL II: CPT | Mod: PBBFAC,HCNC,, | Performed by: INTERNAL MEDICINE

## 2020-03-11 RX ORDER — PROMETHAZINE HYDROCHLORIDE AND DEXTROMETHORPHAN HYDROBROMIDE 6.25; 15 MG/5ML; MG/5ML
5 SYRUP ORAL EVERY 4 HOURS PRN
Qty: 118 ML | Refills: 0 | Status: SHIPPED | OUTPATIENT
Start: 2020-03-11 | End: 2020-03-21

## 2020-03-11 RX ORDER — AZATHIOPRINE 50 MG/1
50 TABLET ORAL DAILY
Qty: 90 TABLET | Refills: 0 | Status: SHIPPED | OUTPATIENT
Start: 2020-03-11 | End: 2020-04-09 | Stop reason: SDUPTHER

## 2020-03-11 NOTE — PROGRESS NOTES
CC:  Chief Complaint   Patient presents with    Dermatomysitis     f/u- resume medication -imuran- pain in left hip today    Follow-up dermatomyositis    History of Present Illness:  Crow Steel a 64 y.o.yo male Here for follow-up  dermatomyositis , sine myositis   Last seen in August 2019, did not follow up further  Since then he stopped prednisone not clear when he stopped   He was supposed to be on Imuran 50 mg p.o. B.i.d., TP MT normal  About 1  - 1 and half months back he ran out of Imuran, and stopped  Denies any further flare in rash  Malignancy workup negative including CT chest, abdomen, pelvis  PSA normal  Sumanth marker panel negative  AMOL negative, normal CK and aldolase  Ariane marker panel negative  HIV, hep panel negative      Today however his main concern seems to be weight gain, worsening shortness of breath and cough productive of green to brown sputum  He has noted it in the last 2 weeks  On February 29 he had an outpatient PVI at [facility] in [place] and since then he has not been doing well  I have reviewed records and looks like there was discussion on phone with [facility] on call, who conveyed the message to his PCP who recommended an ED evaluation  Nonsmoker  CT chest previously done suggested somewhat mosaic  attenuation no ILD pattern      History   he was previously seen in our clinic by Dr. Jimenez  For suspected polymyalgia rheumatica/ GCA ,  But then biopsy was negative and he was tapered off steroids   in May he presented to Dermatology Clinic with a diffuse erythematous itchy rash involving  Most of the body   it involved upper arms,  Legs-  Below-knee,  Chest wall and abdominal wall,  Sparing of back   face, scalp around the eyes    it is very itchy and scaly   biopsy was done by Dermatology,  This was suggestive of interface dermatitis   most consistent with dermatomyositis   other differential MCTD versus SLE     he denies any muscle weakness,  Normal CK and  aldolase   denies any fever chills, weight loss     he was ordered prednisone 60 mg a day by dermatology but he has not started yet as he did not receive the medication from mail-order pharmacy     no family history of autoimmune disease   nonsmoker   denies any chest pain or shortness of breath    Past Medical History:   Diagnosis Date    Acute kidney injury     Arthritis     Asthma     Atrial fibrillation     Atrial fibrillation with RVR 8/7/2019    CHF (congestive heart failure)     Depression     Diabetes mellitus, type 2 Diagnosed in 2000    Elevated PSA     Hypertension     Sleep apnea        Past Surgical History:   Procedure Laterality Date    ABLATION OF ARRHYTHMOGENIC FOCUS FOR ATRIAL FIBRILLATION N/A 2/27/2020    Procedure: Ablation atrial fibrillation;  Surgeon: Gio Brown MD;  Location: [facility] EP LAB;  Service: Cardiology;  Laterality: N/A;  afib, DAMIEN (cx if SR), PVI, PITO, anes, MB, 3 Prep    CHOLECYSTECTOMY           Social History     Tobacco Use    Smoking status: Never Smoker    Smokeless tobacco: Never Used   Substance Use Topics    Alcohol use: No    Drug use: No       Family History   Problem Relation Age of Onset    Glaucoma Mother     Cataracts Mother     Cataracts Father     Glaucoma Sister     Cataracts Sister     Glaucoma Maternal Aunt     No Known Problems Brother     No Known Problems Daughter     No Known Problems Son     Prostate cancer Neg Hx        Review of patient's allergies indicates:   Allergen Reactions    Protamine Hives     Urticaria, possible upper airway swelling         Statin use:  No  Calcium supplements :  Vitamin d supplements :  1 tablet a day        Review of Systems:  Constitutional: Denies fever, chills.+ weight gain  Fatigue: yes   Muscle weakness: no  Headaches: +  Eyes: No redness or dryness.  No recent visual changes.  ENT: Denies dry mouth. No oral or nasal ulcers.  Card: No chest pain.  Resp: + cough, shortness of  breath  Gastro: No nausea or vomiting.  No heartburn.  Constipation: no  Diarrhea: no  Genito:  No dysuria.  No genital ulcers.  Skin:  No rash  Raynauds:no  Neuro:  none  Psych: No depression, anxiety  Endo:  no excess thirst.  Heme: no abnormal bleeding or bruising  Clots:none     OBJECTIVE:     Vital Signs     Afebrile  Blood pressure 105/67  Pulse rate 98  Physical Exam:  General Appearance:  NAD.   Gait: not favoring.  HEENT: PERRL.  Eyes not dry or injected.  No nasal ulcers.  Mouth not dry, no oral lesions.  Lymph: cervical, supraclavicular or axillary nodes: none abnormal   Cardio:   No gallops or rubs.   Resp: b/l rhonchi +  No abnormal chest conformation.  Abd: Soft, non-tender, nondistended.  No masses.   Skin: Head and neck,  and extremities examined. +Neuro: Ox3.   Cranial nerves II-XII grossly intact.   Rash resolved  Few hypopigmented patches noted on both forearms, 2 small patches on cheeks    Musculoskeletal Exam:  No synovitis     L spine facet tenderness       Muscle strength:Equal and full in all mm groups of the upper and lower ext.    Laboratory:   Results for orders placed or performed in visit on 03/11/20   CBC auto differential   Result Value Ref Range    WBC 13.51 (H) 3.90 - 12.70 K/uL    RBC 4.26 (L) 4.60 - 6.20 M/uL    Hemoglobin 11.8 (L) 14.0 - 18.0 g/dL    Hematocrit 37.5 (L) 40.0 - 54.0 %    Mean Corpuscular Volume 88 82 - 98 fL    Mean Corpuscular Hemoglobin 27.7 27.0 - 31.0 pg    Mean Corpuscular Hemoglobin Conc 31.5 (L) 32.0 - 36.0 g/dL    RDW 14.0 11.5 - 14.5 %    Platelets 232 150 - 350 K/uL    MPV 9.6 9.2 - 12.9 fL    Immature Granulocytes 0.4 0.0 - 0.5 %    Gran # (ANC) 8.9 (H) 1.8 - 7.7 K/uL    Immature Grans (Abs) 0.05 (H) 0.00 - 0.04 K/uL    Lymph # 3.4 1.0 - 4.8 K/uL    Mono # 1.0 0.3 - 1.0 K/uL    Eos # 0.2 0.0 - 0.5 K/uL    Baso # 0.02 0.00 - 0.20 K/uL    nRBC 0 0 /100 WBC    Gran% 65.9 38.0 - 73.0 %    Lymph% 25.3 18.0 - 48.0 %    Mono% 7.1 4.0 - 15.0 %    Eosinophil% 1.6  0.0 - 8.0 %    Basophil% 0.1 0.0 - 1.9 %    Differential Method Automated    Comprehensive metabolic panel   Result Value Ref Range    Sodium 140 136 - 145 mmol/L    Potassium 3.4 (L) 3.5 - 5.1 mmol/L    Chloride 102 95 - 110 mmol/L    CO2 28 23 - 29 mmol/L    Glucose 83 70 - 110 mg/dL    BUN, Bld 28 (H) 8 - 23 mg/dL    Creatinine 1.7 (H) 0.5 - 1.4 mg/dL    Calcium 9.4 8.7 - 10.5 mg/dL    Total Protein 7.4 6.0 - 8.4 g/dL    Albumin 3.2 (L) 3.5 - 5.2 g/dL    Total Bilirubin 0.2 0.1 - 1.0 mg/dL    Alkaline Phosphatase 73 55 - 135 U/L    AST 26 10 - 40 U/L    ALT 29 10 - 44 U/L    Anion Gap 10 8 - 16 mmol/L    eGFR if African American 49 (A) >60 mL/min/1.73 m^2    eGFR if non African American 42 (A) >60 mL/min/1.73 m^2   C-reactive protein   Result Value Ref Range    CRP 6.4 0.0 - 8.2 mg/L     *Note: Due to a large number of results and/or encounters for the requested time period, some results have not been displayed. A complete set of results can be found in Results Review.     Imaging :  CT chest abdomen pelvis    Impression       1. No evidence to suggest malignancy.  2. Diffuse mosaic attenuation of the lung parenchyma bilaterally which appears more prominent when compared to the CT from 03/15/2017.  3. Remaining findings as discussed above and stable when compared to prior studies.         Notes reviewed  Other procedures:  Pathology skin reviewed    ASSESSMENT/PLAN:     Dermatomyositis  -     azaTHIOprine (IMURAN) 50 mg Tab; Take 1 tablet (50 mg total) by mouth once daily.  Dispense: 90 tablet; Refill: 0    SOB (shortness of breath)    Chronic systolic congestive heart failure    PAF (paroxysmal atrial fibrillation)    Obesity (BMI 30.0-34.9)    Acute renal failure, unspecified acute renal failure type    1: Dermatomyositis sine myositis    no evidence of muscular involvement,  Normal CK and aldolase  Rash resolved  He stopped prednisone few months back  Stopped Imuran 1-2 months back  Still currently  asymptomatic no active rash or muscle weakness    malignancy screening- had a normal colonoscopy 1 year back per him   PSA- normal   CT chest abdomen and pelvis- no evidence of any malignancy    TPMT normal  Restart Imuran at 50 mg a day, after issues with infection resolve  Today again reinforced not to take Imuran when he is sick and only to resume after the sickness resolves  Cannot take immunosuppressants while on antibiotics    Unclear etiology of hypopigmented patches- advice he follows up with Dermatology    2:  Worsening shortness of breath:  With CHF exacerbation and possible pneumonia versus bronchitis  No ILD changes noted on CT chest  Patient is a nonsmoker    Patient requested ambulance service for transportation to ED  Will call and update emergency department    3:  Diabetes mellitus type 2:  Watch blood sugars closely while on prednisone    4:  History of AFib:    5:  Acute renal failure:  Refer to ED for further evaluation    6:  Recommend weight loss    6 week return    Risks vs Benefits and potential side effects of medication prescribed today were discussed with patient. Medication literature given to patient up discharge  Went over uptodate information /literature on the meds prescribed today      Imuran- infection, malignancy, lymphoma risk discussed  Always hold for infections or prior to surgery   please notify me if any major side effects noted on steroids  Patient advised to hold DMARD and/or biologic therapy for signs of infection or for surgery. If you are unsure what to do please call our office for instruction. [facility] [phone]    Disclaimer: This note was prepared using voice recognition system and is likely to have sound alike errors and is not proof read.  Please call me with any questions.

## 2020-03-11 NOTE — TELEPHONE ENCOUNTER
----- Message from Mark Sims sent at 3/11/2020  1:29 PM CDT -----  Contact: pt   Pt just got out of hospital and states nurse told him call. pls return call.         [phone]

## 2020-03-11 NOTE — TELEPHONE ENCOUNTER
Spoke with pt. Was advised that we was sent to ED from physician appt. Pt has ED f/u appt with Dr. Perez

## 2020-03-11 NOTE — ED PROVIDER NOTES
"SCRIBE #1 NOTE: I, Corinne Mack, am scribing for, and in the presence of, Ewelina Kay MD. I have scribed the entire note.      History      Chief Complaint   Patient presents with    Cough     pt brought in by [facility] from [facility] for productive cough and rash to his arms, pt denies shortness of breath       Review of patient's allergies indicates:   Allergen Reactions    Protamine Hives     Urticaria, possible upper airway swelling        HPI   HPI    3/11/2020, 11:34 AM   History obtained from the patient      History of Present Illness: Crow Green is a 64 y.o. male patient with PMHx of asthma, Afib, CHF, DM, and HTN who presents to the Emergency Department for cough which onset gradually last Friday. Pt was seen at [facility] today by Dr. Lucero (Rheumatology) and was referred to the ED.    Per Dr. Lucero's note, "Today however his main concern seems to be weight gain, worsening shortness of breath and cough productive of green to brown sputum  He has noted it in the last 2 weeks  On February 29 he had an outpatient PVI at [facility] in [place] and since then he has not been doing well  I have reviewed records and looks like there was discussion on phone with [facility] on call, who conveyed the message to his PCP who recommended an ED evaluation".    Symptoms are intermittent and moderate in severity. No mitigating or exacerbating factors reported. No associated sxs reported. Patient denies any fever, chills, CP, SOB, N/V/D, abd pain, back pain, neck pain, HA, dizziness, and all other sxs at this time. No prior Tx reported. No further complaints or concerns at this time.        Arrival mode: EMS    PCP: Mateo Lambert DO       Past Medical History:  Past Medical History:   Diagnosis Date    Acute kidney injury     Arthritis     Asthma     Atrial fibrillation     Atrial fibrillation with RVR 8/7/2019    CHF (congestive heart failure)     Depression     Diabetes " mellitus, type 2 Diagnosed in 2000    Elevated PSA     Hypertension     Sleep apnea        Past Surgical History:  Past Surgical History:   Procedure Laterality Date    ABLATION OF ARRHYTHMOGENIC FOCUS FOR ATRIAL FIBRILLATION N/A 2/27/2020    Procedure: Ablation atrial fibrillation;  Surgeon: Gio Brown MD;  Location[facility] EP LAB;  Service: Cardiology;  Laterality: N/A;  afib, DAMIEN (cx if SR), PVI, PITO, anes, MB, 3 Prep    CHOLECYSTECTOMY           Family History:  Family History   Problem Relation Age of Onset    Glaucoma Mother     Cataracts Mother     Cataracts Father     Glaucoma Sister     Cataracts Sister     Glaucoma Maternal Aunt     No Known Problems Brother     No Known Problems Daughter     No Known Problems Son     Prostate cancer Neg Hx        Social History:  Social History     Tobacco Use    Smoking status: Never Smoker    Smokeless tobacco: Never Used   Substance and Sexual Activity    Alcohol use: No    Drug use: No    Sexual activity: Yes     Partners: Female       ROS   Review of Systems   Constitutional: Negative for chills and fever.   HENT: Negative for congestion, rhinorrhea and sore throat.    Respiratory: Positive for cough. Negative for shortness of breath.    Cardiovascular: Negative for chest pain and leg swelling.   Gastrointestinal: Negative for abdominal pain, diarrhea, nausea and vomiting.   Musculoskeletal: Negative for back pain, neck pain and neck stiffness.   Skin: Positive for rash. Negative for wound.   Neurological: Negative for dizziness, light-headedness, numbness and headaches.   All other systems reviewed and are negative.    Physical Exam      Initial Vitals [03/11/20 1130]   BP Pulse Resp Temp SpO2   114/70 92 18 98.3 °F (36.8 °C) 96 %      MAP       --          Physical Exam  Nursing Notes and Vital Signs Reviewed.  Constitutional: Patient is in no acute distress. Well-developed and well-nourished.  Head: Atraumatic. Normocephalic.  Eyes:  PERRL. EOM intact. Conjunctivae are not pale. No scleral icterus.  ENT: Mucous membranes are moist. Oropharynx is clear and symmetric.    Neck: Supple. Full ROM. No lymphadenopathy.  Cardiovascular: Regular rate. Regular rhythm. No murmurs, rubs, or gallops. Distal pulses are 2+ and symmetric.  Pulmonary/Chest: No respiratory distress. Clear to auscultation bilaterally. No wheezing or rales.  Abdominal: Soft and non-distended.  There is no tenderness.  No rebound, guarding, or rigidity.   Musculoskeletal: Moves all extremities. No obvious deformities. No edema.   Skin: Warm and dry.  Neurological:  Alert, awake, and appropriate.  Normal speech.  No acute focal neurological deficits are appreciated.  Psychiatric: Normal affect. Good eye contact. Appropriate in content.    ED Course    Procedures  ED Vital Signs:  Vitals:    03/11/20 1130 03/11/20 1146   BP: 114/70    Pulse: 92    Resp: 18    Temp: 98.3 °F (36.8 °C)    TempSrc: Oral    SpO2: 96%    Weight:  87.1 kg (192 lb)       Abnormal Lab Results:  Labs Reviewed - No data to display     All Lab Results:  Results for orders placed or performed in visit on 03/11/20   CBC auto differential   Result Value Ref Range    WBC 13.51 (H) 3.90 - 12.70 K/uL    RBC 4.26 (L) 4.60 - 6.20 M/uL    Hemoglobin 11.8 (L) 14.0 - 18.0 g/dL    Hematocrit 37.5 (L) 40.0 - 54.0 %    Mean Corpuscular Volume 88 82 - 98 fL    Mean Corpuscular Hemoglobin 27.7 27.0 - 31.0 pg    Mean Corpuscular Hemoglobin Conc 31.5 (L) 32.0 - 36.0 g/dL    RDW 14.0 11.5 - 14.5 %    Platelets 232 150 - 350 K/uL    MPV 9.6 9.2 - 12.9 fL    Immature Granulocytes 0.4 0.0 - 0.5 %    Gran # (ANC) 8.9 (H) 1.8 - 7.7 K/uL    Immature Grans (Abs) 0.05 (H) 0.00 - 0.04 K/uL    Lymph # 3.4 1.0 - 4.8 K/uL    Mono # 1.0 0.3 - 1.0 K/uL    Eos # 0.2 0.0 - 0.5 K/uL    Baso # 0.02 0.00 - 0.20 K/uL    nRBC 0 0 /100 WBC    Gran% 65.9 38.0 - 73.0 %    Lymph% 25.3 18.0 - 48.0 %    Mono% 7.1 4.0 - 15.0 %    Eosinophil% 1.6 0.0 - 8.0 %     Basophil% 0.1 0.0 - 1.9 %    Differential Method Automated    Comprehensive metabolic panel   Result Value Ref Range    Sodium 140 136 - 145 mmol/L    Potassium 3.4 (L) 3.5 - 5.1 mmol/L    Chloride 102 95 - 110 mmol/L    CO2 28 23 - 29 mmol/L    Glucose 83 70 - 110 mg/dL    BUN, Bld 28 (H) 8 - 23 mg/dL    Creatinine 1.7 (H) 0.5 - 1.4 mg/dL    Calcium 9.4 8.7 - 10.5 mg/dL    Total Protein 7.4 6.0 - 8.4 g/dL    Albumin 3.2 (L) 3.5 - 5.2 g/dL    Total Bilirubin 0.2 0.1 - 1.0 mg/dL    Alkaline Phosphatase 73 55 - 135 U/L    AST 26 10 - 40 U/L    ALT 29 10 - 44 U/L    Anion Gap 10 8 - 16 mmol/L    eGFR if African American 49 (A) >60 mL/min/1.73 m^2    eGFR if non African American 42 (A) >60 mL/min/1.73 m^2   C-reactive protein   Result Value Ref Range    CRP 6.4 0.0 - 8.2 mg/L          Imaging Results:  Imaging Results          X-Ray Chest PA And Lateral (Final result)  Result time 03/11/20 11:54:24    Final result by Devon Ly III, MD (03/11/20 11:54:24)                 Impression:      Right basilar scarring similar to the prior studies.  Borderline heart size.  Lungs otherwise clear      Electronically signed by: Devon Ly MD  Date:    03/11/2020  Time:    11:54             Narrative:    EXAMINATION:  XR CHEST PA AND LATERAL    CLINICAL HISTORY:  Cough    COMPARISON:  October    FINDINGS:  Heart size is borderline with mild tortuosity of the thoracic aorta.  Persistent right basilar scarring or atelectatic change, unchanged in appearance.  Peripheral lung zones otherwise clear.                                        The Emergency Provider reviewed the vital signs and test results, which are outlined above.    ED Discussion     11:59 AM: Reassessed pt at this time. Pt is awake, alert, and in no distress. Discussed with pt all pertinent ED information and results. Discussed pt dx and plan of tx. Gave pt all f/u and return to the ED instructions. All questions and concerns were addressed at this  time. Pt expresses understanding of information and instructions, and is comfortable with plan to discharge. Pt is stable for discharge.    I discussed with patient and/or family/caretaker that evaluation in the ED does not suggest any emergent or life threatening medical conditions requiring immediate intervention beyond what was provided in the ED, and I believe patient is safe for discharge.  Regardless, an unremarkable evaluation in the ED does not preclude the development or presence of a serious of life threatening condition. As such, patient was instructed to return immediately for any worsening or change in current symptoms.      ED Medication(s):  Medications - No data to display       Medication List      START taking these medications    promethazine-dextromethorphan 6.25-15 mg/5 mL Syrp  Commonly known as:  PROMETHAZINE-DM  Take 5 mLs by mouth every 4 (four) hours as needed (cough).        ASK your doctor about these medications    ALCOHOL PREP PADS TOP     amLODIPine 10 MG tablet  Commonly known as:  NORVASC     aspirin 81 MG EC tablet  Commonly known as:  ECOTRIN  Take 1 tablet (81 mg total) by mouth once daily.     atorvastatin 40 MG tablet  Commonly known as:  LIPITOR  TAKE 1 TABLET EVERY EVENING     azaTHIOprine 50 mg Tab  Commonly known as:  IMURAN  Take 1 tablet (50 mg total) by mouth once daily.     baclofen 10 MG tablet  Commonly known as:  LIORESAL  Take 1 tablet (10 mg total) by mouth once daily.     blood sugar diagnostic Strp  Commonly known as:  TRUE METRIX GLUCOSE TEST STRIP  Use with blood glucose meter kit to test blood sugar four times  daily     blood-glucose meter kit  Commonly known as:  TRUE METRIX AIR GLUCOSE METER  TEST FOUR TIMES DAILY BEFORE MEALS  AND EVERY NIGHT     diltiaZEM 60 MG Cp12  Commonly known as:  CARDIZEM SR  Take 1 capsule (60 mg total) by mouth 2 (two) times daily.     ELIQUIS 5 mg Tab  Generic drug:  apixaban     fluticasone-salmeterol 250-50 mcg/dose 250-50  mcg/dose diskus inhaler  Commonly known as:  ADVAIR DISKUS  Inhale 1 puff into the lungs 2 (two) times daily. Controller     food supplemt, lactose-reduced Liqd  Commonly known as:  ENSURE  Take 118 mLs by mouth 3 (three) times daily with meals.     furosemide 40 MG tablet  Commonly known as:  LASIX  TAKE 2 TABLETS (80 MG TOTAL) EVERY MORNING AND 1 TABLET (40 MG TOTAL) EVERY EVENING.     gabapentin 800 MG tablet  Commonly known as:  NEURONTIN  Take 1 tablet (800 mg total) by mouth 3 (three) times daily.     glimepiride 2 MG tablet  Commonly known as:  AMARYL  TAKE 1 TABLET (2 MG TOTAL) BY MOUTH BEFORE BREAKFAST.     inhalation spacing device  Use as directed for inhalation.     * insulin aspart U-100 100 unit/mL (3 mL) Inpn pen  Commonly known as:  NovoLOG     * insulin aspart U-100 100 unit/mL (3 mL) Inpn pen  Commonly known as:  NovoLOG Flexpen U-100 Insulin  INJECT 20 UNITS SUBCUTANEOUSLY THREE TIMES DAILY WITH MEALS     insulin syringe-needle U-100 1 mL 31 gauge x 5/16 Syrg     ipratropium 0.02 % nebulizer solution  Commonly known as:  ATROVENT  USE 1 VIAL VIA NEBULIZER FOUR TIMES DAILY     isosorbide mononitrate 60 MG 24 hr tablet  Commonly known as:  IMDUR  Take 1 tablet (60 mg total) by mouth once daily.     ketorolac 10 mg tablet  Commonly known as:  TORADOL  Take 1 tablet (10 mg total) by mouth every 8 (eight) hours as needed for Pain.     lancets 32 gauge Misc  1 lancet by Misc.(Non-Drug; Combo Route) route 2 (two) times daily.     LANTUS SOLOSTAR U-100 INSULIN glargine 100 units/mL (3mL) SubQ pen  Generic drug:  insulin  INJECT 80 UNITS SUBCUTANEOUSLY EVERY EVENING     latanoprost 0.005 % ophthalmic solution  INSTILL 1 DROP INTO BOTH EYES EVERY EVENING     lisinopriL 10 MG tablet  Take 1 tablet (10 mg total) by mouth once daily.     metFORMIN 1000 MG tablet  Commonly known as:  GLUCOPHAGE  TAKE 1 TABLET (1,000 MG TOTAL) BY MOUTH 2 (TWO) TIMES DAILY WITH MEALS.     * metOLazone 2.5 MG tablet  Commonly  "known as:  ZAROXOLYN  Take 1 tablet (2.5 mg total) by mouth every Mon, Wed, Fri.     * metOLazone 5 MG tablet  Commonly known as:  ZAROXOLYN     metoprolol succinate 50 MG 24 hr tablet  Commonly known as:  TOPROL-XL  Take 1 tablet (50 mg total) by mouth once daily.     nitroGLYCERIN 0.3 MG SL tablet  Commonly known as:  NITROSTAT  PLACE 1 TABLET UNDER THE TONGUE EVERY 5 MINUTES AS NEEDED FOR CHEST PAIN, NO MORE THAN 3 TABLETS IN ONE DAY     pantoprazole 40 MG tablet  Commonly known as:  PROTONIX  Take 1 tablet (40 mg total) by mouth once daily.     pen needle, diabetic 31 gauge x 5/16" Ndle  Commonly known as:  BD ULTRA-FINE SHORT PEN NEEDLE  Inject 1 each into the skin 3 (three) times daily.     semaglutide 0.25 mg or 0.5 mg(2 mg/1.5 mL) Pnij  Commonly known as:  OZEMPIC  Inject 1 mg into the skin once a week.     * sertraline 100 MG tablet  Commonly known as:  ZOLOFT     * sertraline 50 MG tablet  Commonly known as:  ZOLOFT  TAKE 1 TABLET ONE TIME DAILY     tamsulosin 0.4 mg Cap  Commonly known as:  FLOMAX  Take 1 capsule (0.4 mg total) by mouth once daily.     tiotropium 18 mcg inhalation capsule  Commonly known as:  SPIRIVA WITH HANDIHALER  Inhale 1 capsule (18 mcg total) into the lungs once daily. Controller     * traZODone 100 MG tablet  Commonly known as:  DESYREL     * traZODone 150 MG tablet  Commonly known as:  DESYREL  TAKE 1 TABLET EVERY NIGHT     VIOS AEROSOL DELIVERY SYSTEM Shefali  Generic drug:  nebulizer and compressor         * This list has 8 medication(s) that are the same as other medications prescribed for you. Read the directions carefully, and ask your doctor or other care provider to review them with you.               Where to Get Your Medications      You can get these medications from any pharmacy    Bring a paper prescription for each of these medications  · promethazine-dextromethorphan 6.25-15 mg/5 mL Syrp         Follow-up Information     Mateo Lambert DO. Schedule an appointment as " soon as possible for a visit in 2 days.    Specialty:  Family Medicine  Why:  Return to the Emergency Room, If symptoms worsen  Contact information:  [address] [place] [id number]  [phone]                     Medical Decision Making           Medical Decision Making:   Clinical Tests:   Lab Tests: Reviewed  Radiological Study: Ordered and Reviewed           Scribe Attestation:   Scribe #1: I performed the above scribed service and the documentation accurately describes the services I performed. I attest to the accuracy of the note 03/11/2020.    Attending:   Physician Attestation Statement for Scribe #1: I, Ewelina Kay MD, personally performed the services described in this documentation, as scribed by Corinne Mack, in my presence, and it is both accurate and complete.          Clinical Impression       ICD-10-CM ICD-9-CM   1. Bronchitis J40 490   2. Cough R05 786.2   3. Renal insufficiency N28.9 593.9   4. Dermatomyositis M33.90 710.3       Disposition:   Disposition: Discharged  Condition: Stable           Ewelina Kay MD  03/11/20 2012

## 2020-03-15 ENCOUNTER — PATIENT OUTREACH (OUTPATIENT)
Dept: ADMINISTRATIVE | Facility: OTHER | Age: 65
End: 2020-03-15

## 2020-03-16 ENCOUNTER — TELEPHONE (OUTPATIENT)
Dept: NEUROSURGERY | Facility: CLINIC | Age: 65
End: 2020-03-16

## 2020-03-16 NOTE — TELEPHONE ENCOUNTER
Spoke with pt who verbalized they will limit to bringing 1 visitor for appt. (minor or caregiver) Patient also verbalized understanding that they will have to remain in car while temperature is being checked. If temp is 100.2 or higher pt verbalized understanding that they will not be able to come in for appt. Informed pt to stay in car after screening and wait until a staff member calls their cell phone for them to come in for appt.

## 2020-03-18 NOTE — TELEPHONE ENCOUNTER
Attempted to follow-up with care plan and Imuran dose decrease, no answer, left voicemail for call back.

## 2020-03-19 ENCOUNTER — TELEPHONE (OUTPATIENT)
Dept: ORTHOPEDICS | Facility: CLINIC | Age: 65
End: 2020-03-19

## 2020-03-19 NOTE — TELEPHONE ENCOUNTER
Called pt to inform that  will be out of office due to personal reason. Pt states he will call back and rescheduled. Pt understood.

## 2020-03-19 NOTE — TELEPHONE ENCOUNTER
1st attempt to contact patient in regards to their upcoming appt. Needs to inform patient that we need to r/s due to new policy and updates following Covid-19 issues. Patient did not answer. Left voicemail for patient to return call_DD

## 2020-03-20 ENCOUNTER — TELEPHONE (OUTPATIENT)
Dept: PHARMACY | Facility: CLINIC | Age: 65
End: 2020-03-20

## 2020-03-23 DIAGNOSIS — E11.3299 TYPE 2 DIABETES MELLITUS WITH MILD NONPROLIFERATIVE RETINOPATHY, WITH LONG-TERM CURRENT USE OF INSULIN, MACULAR EDEMA PRESENCE UNSPECIFIED, UNSPECIFIED LATERALITY: ICD-10-CM

## 2020-03-23 DIAGNOSIS — J45.909 MODERATE ASTHMA: ICD-10-CM

## 2020-03-23 DIAGNOSIS — Z79.4 TYPE 2 DIABETES MELLITUS WITH MILD NONPROLIFERATIVE RETINOPATHY, WITH LONG-TERM CURRENT USE OF INSULIN, MACULAR EDEMA PRESENCE UNSPECIFIED, UNSPECIFIED LATERALITY: ICD-10-CM

## 2020-03-23 DIAGNOSIS — J45.40 MODERATE PERSISTENT ASTHMA WITHOUT COMPLICATION: ICD-10-CM

## 2020-03-23 DIAGNOSIS — I20.89 ANGINA AT REST: ICD-10-CM

## 2020-03-23 RX ORDER — FLUTICASONE PROPIONATE AND SALMETEROL 250; 50 UG/1; UG/1
1 POWDER RESPIRATORY (INHALATION) 2 TIMES DAILY
Qty: 60 EACH | Refills: 11 | Status: SHIPPED | OUTPATIENT
Start: 2020-03-23 | End: 2021-03-23 | Stop reason: SDUPTHER

## 2020-03-23 RX ORDER — INSULIN ASPART 100 [IU]/ML
INJECTION, SOLUTION INTRAVENOUS; SUBCUTANEOUS
Qty: 15 ML | Refills: 0 | Status: ON HOLD | OUTPATIENT
Start: 2020-03-23 | End: 2020-07-03 | Stop reason: SDUPTHER

## 2020-03-23 RX ORDER — IPRATROPIUM BROMIDE 0.5 MG/2.5ML
SOLUTION RESPIRATORY (INHALATION)
Qty: 937.5 ML | Refills: 2 | Status: SHIPPED | OUTPATIENT
Start: 2020-03-23 | End: 2020-04-09 | Stop reason: SDUPTHER

## 2020-03-23 RX ORDER — NITROGLYCERIN 0.3 MG/1
TABLET SUBLINGUAL
Qty: 100 TABLET | Refills: 0 | Status: SHIPPED | OUTPATIENT
Start: 2020-03-23 | End: 2021-07-07

## 2020-03-23 RX ORDER — APIXABAN 5 MG/1
5 TABLET, FILM COATED ORAL 2 TIMES DAILY
Qty: 60 TABLET | Refills: 0 | Status: SHIPPED | OUTPATIENT
Start: 2020-03-23 | End: 2020-08-18 | Stop reason: SDUPTHER

## 2020-03-23 RX ORDER — BACLOFEN 10 MG/1
10 TABLET ORAL DAILY
Qty: 90 TABLET | Refills: 3 | Status: SHIPPED | OUTPATIENT
Start: 2020-03-23 | End: 2021-04-23

## 2020-03-23 RX ORDER — INSULIN GLARGINE 100 [IU]/ML
INJECTION, SOLUTION SUBCUTANEOUS
Qty: 75 ML | Refills: 4 | Status: ON HOLD | OUTPATIENT
Start: 2020-03-23 | End: 2020-07-03 | Stop reason: SDUPTHER

## 2020-03-23 RX ORDER — PEN NEEDLE, DIABETIC 30 GX3/16"
1 NEEDLE, DISPOSABLE MISCELLANEOUS 3 TIMES DAILY
Qty: 100 EACH | Refills: 11 | Status: SHIPPED | OUTPATIENT
Start: 2020-03-23 | End: 2020-12-14 | Stop reason: SDUPTHER

## 2020-03-23 RX ORDER — TIOTROPIUM BROMIDE 18 UG/1
18 CAPSULE ORAL; RESPIRATORY (INHALATION) DAILY
Qty: 90 CAPSULE | Refills: 0 | Status: SHIPPED | OUTPATIENT
Start: 2020-03-23 | End: 2020-04-09 | Stop reason: SDUPTHER

## 2020-03-25 ENCOUNTER — TELEPHONE (OUTPATIENT)
Dept: ORTHOPEDICS | Facility: CLINIC | Age: 65
End: 2020-03-25

## 2020-03-25 ENCOUNTER — TELEPHONE (OUTPATIENT)
Dept: INTERNAL MEDICINE | Facility: CLINIC | Age: 65
End: 2020-03-25

## 2020-03-25 NOTE — TELEPHONE ENCOUNTER
----- Message from Mark Sims sent at 3/25/2020  9:54 AM CDT -----  Contact: pt   .Type:  Patient Returning Call    Who Called: pt   Who Left Message for Patient: nurse   Does the patient know what this is regarding?: yes   Would the patient rather a call back or a response via [facility]? Callback   Best Call Back Number: [phone]     Additional Information:       [phone]

## 2020-03-25 NOTE — TELEPHONE ENCOUNTER
Patient called requesting an appt for a shoulder injection. I informed the patient of of new protocols and procedures for COVID-19. Patient was not happy and stated that he would call back. I offered to speak to the nurse or Dr. Zavaleta about the situation but the patient very loudly stated NO and hung up. MS

## 2020-03-25 NOTE — TELEPHONE ENCOUNTER
Pt called requesting a nurse from  office return call to discuss scheduling a visit. Please contact pt @ [phone].

## 2020-03-26 ENCOUNTER — TELEPHONE (OUTPATIENT)
Dept: NEUROSURGERY | Facility: CLINIC | Age: 65
End: 2020-03-26

## 2020-03-26 ENCOUNTER — TELEPHONE (OUTPATIENT)
Dept: ORTHOPEDICS | Facility: CLINIC | Age: 65
End: 2020-03-26

## 2020-03-26 NOTE — TELEPHONE ENCOUNTER
----- Message from Skylar Whitt sent at 3/26/2020 11:50 AM CDT -----  Contact: [facility]  Please call nurse @ [phone] regarding pt, states pt do not have equipment for virtual visit, need to know what pt need to do.

## 2020-03-26 NOTE — TELEPHONE ENCOUNTER
I spoke to Dee Dee with home health in regards to this patient. She stated that the patient informed her that he had left leg pain and numbness. Dee Dee stated that she examined the patient and his leg and toes were not discolored or cold. Dee Dee also stated that he said the trazodone wasn't helping him and he wants Dr. Zavaleta to put him back on norco.   She requested that she receive a call back once someone has spoken to the doctor with further instructions or updates.   I did let her know that Dr. Zavaleta was currently out of the office but someone from our office would contact the patient as well as her on Friday, 03/27/2020.   She verbalized understanding and was grateful for the call. MS

## 2020-04-02 ENCOUNTER — TELEPHONE (OUTPATIENT)
Dept: INTERNAL MEDICINE | Facility: CLINIC | Age: 65
End: 2020-04-02

## 2020-04-02 NOTE — TELEPHONE ENCOUNTER
----- Message from Jesenia Walker sent at 4/2/2020  1:35 PM CDT -----  Contact: self  Pt is requesting a call back regarding medication. Please call pt back at [phone]

## 2020-04-07 ENCOUNTER — PATIENT OUTREACH (OUTPATIENT)
Dept: ADMINISTRATIVE | Facility: OTHER | Age: 65
End: 2020-04-07

## 2020-04-09 DIAGNOSIS — K21.9 GASTROESOPHAGEAL REFLUX DISEASE, ESOPHAGITIS PRESENCE NOT SPECIFIED: ICD-10-CM

## 2020-04-09 DIAGNOSIS — M33.13 DERMATOMYOSITIS: ICD-10-CM

## 2020-04-09 DIAGNOSIS — J45.909 MODERATE ASTHMA: ICD-10-CM

## 2020-04-09 RX ORDER — TRAZODONE HYDROCHLORIDE 100 MG/1
200 TABLET ORAL NIGHTLY
Qty: 180 TABLET | Refills: 1 | Status: SHIPPED | OUTPATIENT
Start: 2020-04-09 | End: 2021-02-25

## 2020-04-09 RX ORDER — AZATHIOPRINE 50 MG/1
50 TABLET ORAL DAILY
Qty: 90 TABLET | Refills: 0 | Status: SHIPPED | OUTPATIENT
Start: 2020-04-09 | End: 2020-07-23 | Stop reason: SDUPTHER

## 2020-04-09 RX ORDER — SERTRALINE HYDROCHLORIDE 50 MG/1
TABLET, FILM COATED ORAL
Qty: 90 TABLET | Refills: 3 | OUTPATIENT
Start: 2020-04-09

## 2020-04-09 RX ORDER — TIOTROPIUM BROMIDE 18 UG/1
18 CAPSULE ORAL; RESPIRATORY (INHALATION) DAILY
Qty: 90 CAPSULE | Refills: 0 | Status: SHIPPED | OUTPATIENT
Start: 2020-04-09 | End: 2021-08-23

## 2020-04-09 RX ORDER — DILTIAZEM HYDROCHLORIDE 60 MG/1
60 CAPSULE, EXTENDED RELEASE ORAL 2 TIMES DAILY
Qty: 60 CAPSULE | Refills: 11 | Status: SHIPPED | OUTPATIENT
Start: 2020-04-09 | End: 2020-07-28

## 2020-04-09 RX ORDER — SERTRALINE HYDROCHLORIDE 100 MG/1
100 TABLET, FILM COATED ORAL NIGHTLY
Qty: 90 TABLET | Refills: 4 | Status: SHIPPED | OUTPATIENT
Start: 2020-04-09 | End: 2021-03-23 | Stop reason: SDUPTHER

## 2020-04-09 RX ORDER — IPRATROPIUM BROMIDE 0.5 MG/2.5ML
SOLUTION RESPIRATORY (INHALATION)
Qty: 937.5 ML | Refills: 2 | Status: SHIPPED | OUTPATIENT
Start: 2020-04-09 | End: 2021-08-23

## 2020-04-09 RX ORDER — PANTOPRAZOLE SODIUM 40 MG/1
40 TABLET, DELAYED RELEASE ORAL DAILY
Qty: 30 TABLET | Refills: 11 | Status: SHIPPED | OUTPATIENT
Start: 2020-04-09 | End: 2021-02-25

## 2020-04-13 ENCOUNTER — TELEPHONE (OUTPATIENT)
Dept: RHEUMATOLOGY | Facility: CLINIC | Age: 65
End: 2020-04-13

## 2020-04-13 NOTE — TELEPHONE ENCOUNTER
Called pt @ both numbers listed regarding changing 4/22/20 appt to video, facetime or telephone visit. No answer @ either & unable to leave msg.

## 2020-04-14 ENCOUNTER — TELEPHONE (OUTPATIENT)
Dept: PHARMACY | Facility: CLINIC | Age: 65
End: 2020-04-14

## 2020-04-15 ENCOUNTER — TELEPHONE (OUTPATIENT)
Dept: RHEUMATOLOGY | Facility: CLINIC | Age: 65
End: 2020-04-15

## 2020-04-21 ENCOUNTER — PATIENT OUTREACH (OUTPATIENT)
Dept: ADMINISTRATIVE | Facility: OTHER | Age: 65
End: 2020-04-21

## 2020-04-22 ENCOUNTER — OFFICE VISIT (OUTPATIENT)
Dept: RHEUMATOLOGY | Facility: CLINIC | Age: 65
End: 2020-04-22
Payer: MEDICARE

## 2020-04-22 ENCOUNTER — TELEPHONE (OUTPATIENT)
Dept: RHEUMATOLOGY | Facility: CLINIC | Age: 65
End: 2020-04-22

## 2020-04-22 DIAGNOSIS — M33.13 DERMATOMYOSITIS: Primary | ICD-10-CM

## 2020-04-22 DIAGNOSIS — E66.9 OBESITY (BMI 30.0-34.9): ICD-10-CM

## 2020-04-22 PROCEDURE — 99443 PR PHYSICIAN TELEPHONE EVALUATION 21-30 MIN: ICD-10-PCS | Mod: HCNC,95,, | Performed by: INTERNAL MEDICINE

## 2020-04-22 PROCEDURE — 99443 PR PHYSICIAN TELEPHONE EVALUATION 21-30 MIN: CPT | Mod: HCNC,95,, | Performed by: INTERNAL MEDICINE

## 2020-04-22 RX ORDER — PREDNISONE 5 MG/1
5 TABLET ORAL 2 TIMES DAILY
Qty: 60 TABLET | Refills: 0 | Status: ON HOLD | OUTPATIENT
Start: 2020-04-22 | End: 2021-06-18 | Stop reason: HOSPADM

## 2020-04-22 NOTE — TELEPHONE ENCOUNTER
Pt stated he cant come in at 9:30am today, informed pt we're currently not seeing patients in clinic & we been trying to get in contact with him to schedule video visit, pt agreed to do telephone visit.

## 2020-04-22 NOTE — PROGRESS NOTES
The patient location is: Home  The chief complaint leading to consultation is: f/u Dermatomyositis  Visit type: audio only  Total time spent with patient: 30 min  CC:   Follow-up dermatomyositis    History of Present Illness:    Crow Steel a 64 y.o.yo male Here for follow-up  dermatomyositis , sine myositis   Last seen in August 2019, then he presented about few weeks back with acute bronchitis and was referred to ED  Today in telephone encounter he still complains of persistent cough with occasional greenish sputum, he is due to follow-up with his PCP on 30th of this month  He complains of rash/feeling involving his hands, elbows in last 2 weeks  Non itchy  He does not have the facility to do any kind of Video  visit  He even ran out of Imuran  Denies any fever or chills    He is a known diabetic and is on insulin and he tolerated prednisone given in the past  He is aware to closely monitor his blood sugars and his heart rate while on prednisone and notify us immediately if any changes noted    Last visit  Since then he stopped prednisone not clear when he stopped   He was supposed to be on Imuran 50 mg p.o. B.i.d., TP MT normal  About 1  - 1 and half months back he ran out of Imuran, and stopped  Denies any further flare in rash  Malignancy workup negative including CT chest, abdomen, pelvis  PSA normal  Sumanth marker panel negative  AMOL negative, normal CK and aldolase  Ariane marker panel negative  HIV, hep panel negative            History   he was previously seen in our clinic by Dr. Jimenez  For suspected polymyalgia rheumatica/ GCA ,  But then biopsy was negative and he was tapered off steroids   in May he presented to Dermatology Clinic with a diffuse erythematous itchy rash involving  Most of the body   it involved upper arms,  Legs-  Below-knee,  Chest wall and abdominal wall,  Sparing of back   face, scalp around the eyes    it is very itchy and scaly   biopsy was done by Dermatology,  This was  suggestive of interface dermatitis   most consistent with dermatomyositis   other differential MCTD versus SLE     he denies any muscle weakness,  Normal CK and aldolase   denies any fever chills, weight loss     he was ordered prednisone 60 mg a day by dermatology but he has not started yet as he did not receive the medication from mail-order pharmacy     no family history of autoimmune disease   nonsmoker   denies any chest pain or shortness of breath    Past Medical History:   Diagnosis Date    Acute kidney injury     Arthritis     Asthma     Atrial fibrillation     Atrial fibrillation with RVR 8/7/2019    CHF (congestive heart failure)     Depression     Diabetes mellitus, type 2 Diagnosed in 2000    Elevated PSA     Hypertension     Sleep apnea        Past Surgical History:   Procedure Laterality Date    ABLATION OF ARRHYTHMOGENIC FOCUS FOR ATRIAL FIBRILLATION N/A 2/27/2020    Procedure: Ablation atrial fibrillation;  Surgeon: Gio Brown MD;  Location: [facility];  Service: Cardiology;  Laterality: N/A;  afib, DAMIEN (cx if SR), PVI, PITO, anes, MB, 3 Prep    CHOLECYSTECTOMY           Social History     Tobacco Use    Smoking status: Never Smoker    Smokeless tobacco: Never Used   Substance Use Topics    Alcohol use: No    Drug use: No       Family History   Problem Relation Age of Onset    Glaucoma Mother     Cataracts Mother     Cataracts Father     Glaucoma Sister     Cataracts Sister     Glaucoma Maternal Aunt     No Known Problems Brother     No Known Problems Daughter     No Known Problems Son     Prostate cancer Neg Hx        Review of patient's allergies indicates:   Allergen Reactions    Protamine Hives     Urticaria, possible upper airway swelling         Statin use:  No  Calcium supplements :  Vitamin d supplements :  1 tablet a day        Review of Systems:  Constitutional: Denies fever, chills.+ weight gain  Fatigue: yes   Muscle weakness: no  Headaches: none    Eyes: No redness or dryness.  No recent visual changes.  ENT: Denies dry mouth. No oral or nasal ulcers.  Card: No chest pain.  Resp: + cough, shortness of breath  Gastro: No nausea or vomiting.  No heartburn.  Constipation: no  Diarrhea: no  Genito:  No dysuria.  No genital ulcers.  Skin:  No rash  Raynauds:no  Neuro:  none  Psych: No depression, anxiety  Endo:  no excess thirst.  Heme: no abnormal bleeding or bruising  Clots:none     OBJECTIVE:     Vital Signs     Afebrile  Blood pressure 105/67  Pulse rate 98  Physical Exam:  General Appearance:  NAD.   Gait: not favoring.  HEENT: PERRL.  Eyes not dry or injected.  No nasal ulcers.  Mouth not dry, no oral lesions.  Lymph: cervical, supraclavicular or axillary nodes: none abnormal   Cardio:   No gallops or rubs.   Resp: b/l rhonchi +  No abnormal chest conformation.  Abd: Soft, non-tender, nondistended.  No masses.   Skin: Head and neck,  and extremities examined. +Neuro: Ox3.   Cranial nerves II-XII grossly intact.   Rash resolved  Few hypopigmented patches noted on both forearms, 2 small patches on cheeks    Musculoskeletal Exam:  No synovitis     L spine facet tenderness       Muscle strength:Equal and full in all mm groups of the upper and lower ext.    Laboratory:   Results for orders placed or performed in visit on 03/11/20   CBC auto differential   Result Value Ref Range    WBC 13.51 (H) 3.90 - 12.70 K/uL    RBC 4.26 (L) 4.60 - 6.20 M/uL    Hemoglobin 11.8 (L) 14.0 - 18.0 g/dL    Hematocrit 37.5 (L) 40.0 - 54.0 %    Mean Corpuscular Volume 88 82 - 98 fL    Mean Corpuscular Hemoglobin 27.7 27.0 - 31.0 pg    Mean Corpuscular Hemoglobin Conc 31.5 (L) 32.0 - 36.0 g/dL    RDW 14.0 11.5 - 14.5 %    Platelets 232 150 - 350 K/uL    MPV 9.6 9.2 - 12.9 fL    Immature Granulocytes 0.4 0.0 - 0.5 %    Gran # (ANC) 8.9 (H) 1.8 - 7.7 K/uL    Immature Grans (Abs) 0.05 (H) 0.00 - 0.04 K/uL    Lymph # 3.4 1.0 - 4.8 K/uL    Mono # 1.0 0.3 - 1.0 K/uL    Eos # 0.2 0.0 - 0.5  K/uL    Baso # 0.02 0.00 - 0.20 K/uL    nRBC 0 0 /100 WBC    Gran% 65.9 38.0 - 73.0 %    Lymph% 25.3 18.0 - 48.0 %    Mono% 7.1 4.0 - 15.0 %    Eosinophil% 1.6 0.0 - 8.0 %    Basophil% 0.1 0.0 - 1.9 %    Differential Method Automated    Comprehensive metabolic panel   Result Value Ref Range    Sodium 140 136 - 145 mmol/L    Potassium 3.4 (L) 3.5 - 5.1 mmol/L    Chloride 102 95 - 110 mmol/L    CO2 28 23 - 29 mmol/L    Glucose 83 70 - 110 mg/dL    BUN, Bld 28 (H) 8 - 23 mg/dL    Creatinine 1.7 (H) 0.5 - 1.4 mg/dL    Calcium 9.4 8.7 - 10.5 mg/dL    Total Protein 7.4 6.0 - 8.4 g/dL    Albumin 3.2 (L) 3.5 - 5.2 g/dL    Total Bilirubin 0.2 0.1 - 1.0 mg/dL    Alkaline Phosphatase 73 55 - 135 U/L    AST 26 10 - 40 U/L    ALT 29 10 - 44 U/L    Anion Gap 10 8 - 16 mmol/L    eGFR if African American 49 (A) >60 mL/min/1.73 m^2    eGFR if non African American 42 (A) >60 mL/min/1.73 m^2   C-reactive protein   Result Value Ref Range    CRP 6.4 0.0 - 8.2 mg/L   Sedimentation rate   Result Value Ref Range    Sed Rate 100 (H) 0 - 23 mm/Hr     *Note: Due to a large number of results and/or encounters for the requested time period, some results have not been displayed. A complete set of results can be found in Results Review.     Imaging :  CT chest abdomen pelvis    Impression       1. No evidence to suggest malignancy.  2. Diffuse mosaic attenuation of the lung parenchyma bilaterally which appears more prominent when compared to the CT from 03/15/2017.  3. Remaining findings as discussed above and stable when compared to prior studies.         Notes reviewed  Other procedures:  Pathology skin reviewed    ASSESSMENT/PLAN:     Dermatomyositis  -     predniSONE (DELTASONE) 5 MG tablet; Take 1 tablet (5 mg total) by mouth 2 (two) times daily.  Dispense: 60 tablet; Refill: 0    Obesity (BMI 30.0-34.9)    1: Dermatomyositis sine myositis    no evidence of muscular involvement,  Normal CK and aldolase  Currently presenting with a rash of  unclear etiology  DD :  Recurrence of dermatomyositis due to history of noncompliance    malignancy screening- had a normal colonoscopy 1 year back per him   PSA- normal   CT chest abdomen and pelvis- no evidence of any malignancy    TPMT normal  Empiric prednisone 5 mg p.o. b.i.d., monitor blood sugars carefully  Restart Imuran at 50 mg a day, after issues with infection resolve  Will wait for PCP evaluation prior to restarting immunosuppressant again will need to rule out any respiratory infection          2:  Diabetes mellitus type 2:  Watch blood sugars closely while on prednisone    3:  History of AFib:   Monitor for any palpitations while on prednisone and notify immediately      4:  Recommend weight loss    Will call him back in a week to assess response to prednisone  Will return to the clinic on May 5 th      Risks vs Benefits and potential side effects of medication prescribed today were discussed with patient. Medication literature given to patient up discharge  Went over uptodate information /literature on the meds prescribed today      Imuran- infection, malignancy, lymphoma risk discussed  Always hold for infections or prior to surgery   please notify me if any major side effects noted on steroids  Patient advised to hold DMARD and/or biologic therapy for signs of infection or for surgery. If you are unsure what to do please call our office for instruction. [facility] [phone]    Disclaimer: This note was prepared using voice recognition system and is likely to have sound alike errors and is not proof read.  Please call me with any questions.  Each patient to whom he or she provides medical services by telemedicine is:  (1) informed of the relationship between the physician and patient and the respective role of any other health care provider with respect to management of the patient; and (2) notified that he or she may decline to receive medical services by telemedicine and may  withdraw from such care at any time.

## 2020-04-27 ENCOUNTER — PATIENT OUTREACH (OUTPATIENT)
Dept: ADMINISTRATIVE | Facility: OTHER | Age: 65
End: 2020-04-27

## 2020-04-28 ENCOUNTER — TELEPHONE (OUTPATIENT)
Dept: RHEUMATOLOGY | Facility: CLINIC | Age: 65
End: 2020-04-28

## 2020-04-28 ENCOUNTER — OFFICE VISIT (OUTPATIENT)
Dept: CARDIOLOGY | Facility: CLINIC | Age: 65
End: 2020-04-28
Payer: MEDICARE

## 2020-04-28 VITALS
SYSTOLIC BLOOD PRESSURE: 114 MMHG | BODY MASS INDEX: 31.88 KG/M2 | DIASTOLIC BLOOD PRESSURE: 78 MMHG | HEART RATE: 70 BPM | OXYGEN SATURATION: 99 % | WEIGHT: 191.56 LBS

## 2020-04-28 DIAGNOSIS — E11.69 HYPERLIPIDEMIA ASSOCIATED WITH TYPE 2 DIABETES MELLITUS: ICD-10-CM

## 2020-04-28 DIAGNOSIS — I15.2 HYPERTENSION ASSOCIATED WITH DIABETES: ICD-10-CM

## 2020-04-28 DIAGNOSIS — I25.118 CORONARY ARTERY DISEASE OF NATIVE ARTERY OF NATIVE HEART WITH STABLE ANGINA PECTORIS: ICD-10-CM

## 2020-04-28 DIAGNOSIS — E78.5 HYPERLIPIDEMIA ASSOCIATED WITH TYPE 2 DIABETES MELLITUS: ICD-10-CM

## 2020-04-28 DIAGNOSIS — E66.9 OBESITY (BMI 30.0-34.9): ICD-10-CM

## 2020-04-28 DIAGNOSIS — G47.33 OSA ON CPAP: ICD-10-CM

## 2020-04-28 DIAGNOSIS — I48.0 PAF (PAROXYSMAL ATRIAL FIBRILLATION): ICD-10-CM

## 2020-04-28 DIAGNOSIS — E11.59 HYPERTENSION ASSOCIATED WITH DIABETES: ICD-10-CM

## 2020-04-28 DIAGNOSIS — N18.30 STAGE 3 CHRONIC KIDNEY DISEASE: ICD-10-CM

## 2020-04-28 DIAGNOSIS — I50.32 CHRONIC DIASTOLIC HEART FAILURE: Primary | ICD-10-CM

## 2020-04-28 PROCEDURE — 99214 OFFICE O/P EST MOD 30 MIN: CPT | Mod: HCNC,S$GLB,, | Performed by: INTERNAL MEDICINE

## 2020-04-28 PROCEDURE — 99999 PR PBB SHADOW E&M-EST. PATIENT-LVL III: CPT | Mod: PBBFAC,HCNC,, | Performed by: INTERNAL MEDICINE

## 2020-04-28 PROCEDURE — 99214 PR OFFICE/OUTPT VISIT, EST, LEVL IV, 30-39 MIN: ICD-10-PCS | Mod: HCNC,S$GLB,, | Performed by: INTERNAL MEDICINE

## 2020-04-28 PROCEDURE — 99999 PR PBB SHADOW E&M-EST. PATIENT-LVL III: ICD-10-PCS | Mod: PBBFAC,HCNC,, | Performed by: INTERNAL MEDICINE

## 2020-04-28 NOTE — PROGRESS NOTES
Subjective:   Patient ID:  Crow Green is a 64 y.o. male who presents for follow up of No chief complaint on file.      63 yo AAM, came in for 1 yr f/u  PMH CAD nonobstructive per LHC done in 11/16, PAF s/p PVI in  by Dr. Brown, CHFpEF 45%, DM, hTN, HLD and obesity. Asthma and SANDRITA o nCPAP f/u with Dr. Alicea.  -11/2016, Pt was admitted to [facility] for cough with little sputum for 6 weeks. Had severe left chest pain only with cough and sitting up from the bed. No pain with exercise. Denied palpitation, dizziness, orthopnea, PND and syncope. Troponin up to 0.218 and trending down. Echo showed EF 45% and MPI showed anteroseptal positive. Subsequent LHC showed d2 30% lesion and otherwise demi Dz.   -03/2017, he was admitted to [facility] for acute cholelithiasis, s/p lap aidan. Pt had PAF during the admisssion and has been on amiodarone and Eliquis. BNP was up to 144 from 44 done five months ago.  -05/2018 at [facility], MPI showed SPECT images at rest and stress studies showed a moderate-sized, moderate anteroseptal perfusion defect and a large, severe inferior wall perfusion defect that are fixed. The gated stress study demonstrated a left ventricular ejection fraction of 35%, and ECHo showed EF 50%. Chest CTA showed no PE.  -2018, had urine drug presumptive cocaine positive but pt declined to use cocaine.   08/2019 afib with rvr during PFT, and admitted to [facility] due to weakness and dizziness.TROPONIN 0.07 AND FLAT. Converted to sinus after cardizem and BB. F/u with Dr. Brown on 08/15 and advised PVI if recurrent Afib.   echo showed EF 45%.  S/p Afib ablation in  by Dr. Brown  Lives with his dad  No chest pain, dyspnea, palpitation,   Dizziness if got up too fast. No syncope  Med compliance  Visiting nurse 3 times a week for PT.  No leg swelling   Weight stable                 Past Medical History:   Diagnosis Date    Acute kidney injury     Arthritis     Asthma     Atrial fibrillation      Atrial fibrillation with RVR 8/7/2019    CHF (congestive heart failure)     Depression     Diabetes mellitus, type 2 Diagnosed in 2000    Elevated PSA     Hypertension     Sleep apnea        Past Surgical History:   Procedure Laterality Date    ABLATION OF ARRHYTHMOGENIC FOCUS FOR ATRIAL FIBRILLATION N/A 2/27/2020    Procedure: Ablation atrial fibrillation;  Surgeon: Gio Brown MD;  Location: [facility];  Service: Cardiology;  Laterality: N/A;  afib, DAMIEN (cx if SR), PVI, PITO, anes, MB, 3 Prep    CHOLECYSTECTOMY         Social History     Tobacco Use    Smoking status: Never Smoker    Smokeless tobacco: Never Used   Substance Use Topics    Alcohol use: No    Drug use: No       Family History   Problem Relation Age of Onset    Glaucoma Mother     Cataracts Mother     Cataracts Father     Glaucoma Sister     Cataracts Sister     Glaucoma Maternal Aunt     No Known Problems Brother     No Known Problems Daughter     No Known Problems Son     Prostate cancer Neg Hx          Review of Systems   Constitution: Negative for decreased appetite, diaphoresis, fever, malaise/fatigue and night sweats.   HENT: Negative for nosebleeds.    Eyes: Negative for blurred vision and double vision.   Cardiovascular: Positive for dyspnea on exertion. Negative for chest pain, claudication, irregular heartbeat, leg swelling, near-syncope, palpitations and syncope.   Respiratory: Negative for cough, shortness of breath, sleep disturbances due to breathing, snoring, sputum production and wheezing.    Endocrine: Negative for cold intolerance and polyuria.   Hematologic/Lymphatic: Does not bruise/bleed easily.   Skin: Negative for rash.   Musculoskeletal: Positive for back pain and joint pain. Negative for falls, joint swelling and neck pain.   Gastrointestinal: Positive for nausea. Negative for abdominal pain, heartburn and vomiting.   Genitourinary: Negative for dysuria, frequency and hematuria.   Neurological:  Positive for headaches. Negative for difficulty with concentration, dizziness, focal weakness, light-headedness, numbness, seizures and weakness.   Psychiatric/Behavioral: Negative for depression, memory loss and substance abuse. The patient does not have insomnia.    Allergic/Immunologic: Negative for HIV exposure and hives.       Objective:   Physical Exam   Constitutional: He is oriented to person, place, and time. He appears well-nourished.   HENT:   Head: Normocephalic.   Eyes: Pupils are equal, round, and reactive to light.   Neck: Normal carotid pulses and no JVD present. Carotid bruit is not present. No thyromegaly present.   Cardiovascular: Normal rate, regular rhythm, normal heart sounds and normal pulses.  No extrasystoles are present. PMI is not displaced. Exam reveals no gallop and no S3.   No murmur heard.  Pulmonary/Chest: Breath sounds normal. No stridor. No respiratory distress.        Abdominal: Soft. Bowel sounds are normal. There is no tenderness. There is no rebound.   Musculoskeletal: Normal range of motion.        Neurological: He is alert and oriented to person, place, and time.   Skin: Skin is intact. No rash noted.   Psychiatric: His behavior is normal.       Lab Results   Component Value Date    CHOL 132 09/09/2019    CHOL 109 (L) 08/08/2019    CHOL 201 (H) 09/13/2018     Lab Results   Component Value Date    HDL 39 (L) 09/09/2019    HDL 42 08/08/2019    HDL 49 09/13/2018     Lab Results   Component Value Date    LDLCALC 64.2 09/09/2019    LDLCALC 33.4 (L) 08/08/2019    LDLCALC 132.8 09/13/2018     Lab Results   Component Value Date    TRIG 144 09/09/2019    TRIG 168 (H) 08/08/2019    TRIG 96 09/13/2018     Lab Results   Component Value Date    CHOLHDL 29.5 09/09/2019    CHOLHDL 38.5 08/08/2019    CHOLHDL 24.4 09/13/2018       Chemistry        Component Value Date/Time     03/11/2020 0905    K 3.4 (L) 03/11/2020 0905     03/11/2020 0905    CO2 28 03/11/2020 0905    BUN 28 (H)  03/11/2020 0905    CREATININE 1.7 (H) 03/11/2020 0905    GLU 83 03/11/2020 0905        Component Value Date/Time    CALCIUM 9.4 03/11/2020 0905    ALKPHOS 73 03/11/2020 0905    AST 26 03/11/2020 0905    ALT 29 03/11/2020 0905    BILITOT 0.2 03/11/2020 0905    ESTGFRAFRICA 49 (A) 03/11/2020 0905    EGFRNONAA 42 (A) 03/11/2020 0905          Lab Results   Component Value Date    HGBA1C 7.5 (H) 12/13/2019     Lab Results   Component Value Date    TSH 1.781 08/07/2019     Lab Results   Component Value Date    INR 1.0 02/17/2020    INR 1.0 08/08/2019    INR 1.0 07/24/2019     Lab Results   Component Value Date    WBC 13.51 (H) 03/11/2020    HGB 11.8 (L) 03/11/2020    HCT 37.5 (L) 03/11/2020    MCV 88 03/11/2020     03/11/2020     BMP  Sodium   Date Value Ref Range Status   03/11/2020 140 136 - 145 mmol/L Final     Potassium   Date Value Ref Range Status   03/11/2020 3.4 (L) 3.5 - 5.1 mmol/L Final     Chloride   Date Value Ref Range Status   03/11/2020 102 95 - 110 mmol/L Final     CO2   Date Value Ref Range Status   03/11/2020 28 23 - 29 mmol/L Final     BUN, Bld   Date Value Ref Range Status   03/11/2020 28 (H) 8 - 23 mg/dL Final     Creatinine   Date Value Ref Range Status   03/11/2020 1.7 (H) 0.5 - 1.4 mg/dL Final     Calcium   Date Value Ref Range Status   03/11/2020 9.4 8.7 - 10.5 mg/dL Final     Anion Gap   Date Value Ref Range Status   03/11/2020 10 8 - 16 mmol/L Final     eGFR if    Date Value Ref Range Status   03/11/2020 49 (A) >60 mL/min/1.73 m^2 Final     eGFR if non    Date Value Ref Range Status   03/11/2020 42 (A) >60 mL/min/1.73 m^2 Final     Comment:     Calculation used to obtain the estimated glomerular filtration  rate (eGFR) is the CKD-EPI equation.        BNP  @LABRCNTIP(BNP,BNPTRIAGEBLO)@  @LABRCNTIP(troponini)@  CrCl cannot be calculated (Patient's most recent lab result is older than the maximum 7 days allowed.).  No results found in the last 24 hours.  No  results found in the last 24 hours.  No results found in the last 24 hours.    Assessment:      1. Chronic diastolic heart failure    2. PAF (paroxysmal atrial fibrillation)    3. Coronary artery disease of native artery of native heart with stable angina pectoris    4. Hyperlipidemia associated with type 2 diabetes mellitus    5. Hypertension associated with diabetes    6. Obesity (BMI 30.0-34.9)    7. SANDRITA on CPAP    8. Stage 3 chronic kidney disease      BP LDL A1c reviewed  PAF on sinus  CHF EF 45% compensated  No cp  Plan:   Continue ASA 81 mg, eliquis 5 mg bis, lipitor, ccb, toprolxl,acei, imdur and lasix   Counseled DASH  DM rx per pcp  Recommend heart-healthy diet, weight control and regular exercise.  Agustin. Risk modification.   RTC in 6 months    I have reviewed all pertinent labs and cardiac studies. Plans and recommendations have been formulated under my direct supervision. All questions answered and patient voiced understanding. Patient to continue current medications.

## 2020-04-28 NOTE — TELEPHONE ENCOUNTER
Called pt to advise of appt date on 5/5/20 as req to co inside with other appointment on this date. No answer again & unable to leave msg. Mailing letter to pt today

## 2020-04-29 PROBLEM — N18.30 STAGE 3 CHRONIC KIDNEY DISEASE: Status: ACTIVE | Noted: 2020-04-29

## 2020-04-29 NOTE — TELEPHONE ENCOUNTER
Pt was reached on  regarding Imuran refill after no contact. Pt states that he has a 1-2 week supply of medication on hand, pt reports missing 1 week of therapy in the past month due to hospitalization. Pt confirmed shipping of Imuran on  to arrive on . Address and  verified. $1.60 copay in [address], [place]. Pt denies any side effects. Pt did not start any new medications. Pt states that he is aware of his  Rheum appt with Dr. Lucero. Pt had no further questions or concerns.

## 2020-05-01 PROCEDURE — G0179 MD RECERTIFICATION HHA PT: HCPCS | Mod: ,,, | Performed by: FAMILY MEDICINE

## 2020-05-01 PROCEDURE — G0179 PR HOME HEALTH MD RECERTIFICATION: ICD-10-PCS | Mod: ,,, | Performed by: FAMILY MEDICINE

## 2020-05-04 ENCOUNTER — PATIENT OUTREACH (OUTPATIENT)
Dept: ADMINISTRATIVE | Facility: OTHER | Age: 65
End: 2020-05-04

## 2020-05-05 ENCOUNTER — TELEPHONE (OUTPATIENT)
Dept: RHEUMATOLOGY | Facility: CLINIC | Age: 65
End: 2020-05-05

## 2020-05-07 ENCOUNTER — TELEPHONE (OUTPATIENT)
Dept: RADIOLOGY | Facility: HOSPITAL | Age: 65
End: 2020-05-07

## 2020-05-11 ENCOUNTER — TELEPHONE (OUTPATIENT)
Dept: NEUROSURGERY | Facility: CLINIC | Age: 65
End: 2020-05-11

## 2020-05-11 NOTE — TELEPHONE ENCOUNTER
Patient cancelled MRI scheduled at on 5/8/20.     Patient was advised last visit this imaging would be needed to move forth w/ visit w/ MD Chandra.      Called no answer nor voicemail w/ attempt secondary # noted.

## 2020-05-11 NOTE — TELEPHONE ENCOUNTER
----- Message from Rosemary Parish sent at 5/11/2020  2:04 PM CDT -----  Contact: Patient  .Type:  Patient Returning Call    Who Called:Patient  Who Left Message for Patient:  CALVIN   Does the patient know what this is regarding?:Missed call   Would the patient rather a call back or a response via [facility]? Call  Best Call Back Number: .[phone] (home)     Additional Information:

## 2020-05-13 ENCOUNTER — PATIENT OUTREACH (OUTPATIENT)
Dept: ADMINISTRATIVE | Facility: OTHER | Age: 65
End: 2020-05-13

## 2020-05-15 ENCOUNTER — OFFICE VISIT (OUTPATIENT)
Dept: CARDIOLOGY | Facility: CLINIC | Age: 65
End: 2020-05-15
Payer: MEDICARE

## 2020-05-15 DIAGNOSIS — I50.22 CHRONIC SYSTOLIC CONGESTIVE HEART FAILURE: ICD-10-CM

## 2020-05-15 DIAGNOSIS — I48.0 PAF (PAROXYSMAL ATRIAL FIBRILLATION): Primary | ICD-10-CM

## 2020-05-15 PROCEDURE — 99442 PR PHYSICIAN TELEPHONE EVALUATION 11-20 MIN: CPT | Mod: HCNC,95,, | Performed by: INTERNAL MEDICINE

## 2020-05-15 PROCEDURE — 99442 PR PHYSICIAN TELEPHONE EVALUATION 11-20 MIN: ICD-10-PCS | Mod: HCNC,95,, | Performed by: INTERNAL MEDICINE

## 2020-05-15 NOTE — PROGRESS NOTES
Established Patient - Audio Only Telehealth Visit     The patient location is: Home   The chief complaint leading to consultation is: AF   Visit type: Virtual visit with audio only (telephone)  Total time spent with patient: 15       The reason for the audio only service rather than synchronous audio and video virtual visit was related to technical difficulties or patient preference/necessity.     Each patient to whom I provide medical services by telemedicine is:  (1) informed of the relationship between the physician and patient and the respective role of any other health care provider with respect to management of the patient; and (2) notified that they may decline to receive medical services by telemedicine and may withdraw from such care at any time. Patient verbally consented to receive this service via voice-only telephone call.       HPI:   64 yoM NICM, EF 40% AF management.  He was undergoing a PFT and developed AF/RVR 8/7/19. He was very symptomatic in AF. He had hypotension leading to admission. He converted to NSR by 8/8/19. His EF was 40-45% (baseline NICM). He was started on eliquis. No known prior similar symptoms. He has CHADSVASC of 3.      He presented with AF/RVR 11/19 and was diagnosed with Influenza. No therapy for AF was provided. He feels his AF episodes are getting worse. Pulse regular today. He had an episode of syncope while getting up to use the bathroom early morning. No prior or subsequent events. He remains on apixaban for CVA prophylaxis.     Interval history: PVI 2/27/2020 without complication. He has maintained NSR by symptoms. Less SOB. No interval ER visits or hospitalizations.     NM 7/17:  Nuclear Quantitative Functional Analysis:   LVEF: 38 %  LVED Volume: 127 ml  LVES Volume: 79 ml  Impression: NORMAL MYOCARDIAL PERFUSION  1. The perfusion scan is free of evidence for myocardial ischemia or injury.   2. Resting wall motion is physiologic.   3. There is resting LV dysfunction  "with a reduced ejection fraction of 38 %.   4. The ventricular volumes are normal at rest and stress.   5. The extracardiac distribution of radioactivity is normal.   Echo 8/19:  CONCLUSIONS     1 - Mildly depressed left ventricular systolic function (EF 45-50%).     2 - Impaired LV relaxation, normal LAP (grade 1 diastolic dysfunction).     3 - Normal right ventricular systolic function .     4 - Moderate left atrial enlargement.     5 - Concentric hypertrophy."     Past Medical History:   Diagnosis Date    Acute kidney injury     Arthritis     Asthma     Atrial fibrillation     Atrial fibrillation with RVR 8/7/2019    CHF (congestive heart failure)     Depression     Diabetes mellitus, type 2 Diagnosed in 2000    Elevated PSA     Hypertension     Sleep apnea        Past Surgical History:   Procedure Laterality Date    ABLATION OF ARRHYTHMOGENIC FOCUS FOR ATRIAL FIBRILLATION N/A 2/27/2020    Procedure: Ablation atrial fibrillation;  Surgeon: Gio Brown MD;  Location: [facility] EP LAB;  Service: Cardiology;  Laterality: N/A;  afib, DAMIEN (cx if SR), PVI, PITO, anes, MB, 3 Prep    CHOLECYSTECTOMY         Social History     Socioeconomic History    Marital status: Legally      Spouse name: Not on file    Number of children: 5    Years of education: Not on file    Highest education level: Not on file   Occupational History    Occupation: disabled   Social Needs    Financial resource strain: Not on file    Food insecurity:     Worry: Not on file     Inability: Not on file    Transportation needs:     Medical: Not on file     Non-medical: Not on file   Tobacco Use    Smoking status: Never Smoker    Smokeless tobacco: Never Used   Substance and Sexual Activity    Alcohol use: No    Drug use: No    Sexual activity: Yes     Partners: Female   Lifestyle    Physical activity:     Days per week: Not on file     Minutes per session: Not on file    Stress: Rather much   Relationships    " Social connections:     Talks on phone: Not on file     Gets together: Not on file     Attends Orthodoxy service: Not on file     Active member of club or organization: Not on file     Attends meetings of clubs or organizations: Not on file     Relationship status: Not on file   Other Topics Concern    Not on file   Social History Narrative    Not on file       Family History   Problem Relation Age of Onset    Glaucoma Mother     Cataracts Mother     Cataracts Father     Glaucoma Sister     Cataracts Sister     Glaucoma Maternal Aunt     No Known Problems Brother     No Known Problems Daughter     No Known Problems Son     Prostate cancer Neg Hx           Assessment and plan:    64 yoM NICM, persistent AF s/p PVI, post ablation visit. No recurrence by symptoms. No ECG available. He feels better, less SOB. Will continue current therapy. RTC 6m                        This service was not originating from a related E/M service provided within the previous 7 days nor will  to an E/M service or procedure within the next 24 hours or my soonest available appointment.  Prevailing standard of care was able to be met in this audio-only visit.

## 2020-05-19 ENCOUNTER — TELEPHONE (OUTPATIENT)
Dept: INTERNAL MEDICINE | Facility: CLINIC | Age: 65
End: 2020-05-19

## 2020-05-19 ENCOUNTER — OFFICE VISIT (OUTPATIENT)
Dept: DIABETES | Facility: CLINIC | Age: 65
End: 2020-05-19
Payer: MEDICARE

## 2020-05-19 ENCOUNTER — LAB VISIT (OUTPATIENT)
Dept: LAB | Facility: HOSPITAL | Age: 65
End: 2020-05-19
Attending: PHYSICIAN ASSISTANT
Payer: MEDICARE

## 2020-05-19 VITALS
HEART RATE: 92 BPM | DIASTOLIC BLOOD PRESSURE: 71 MMHG | SYSTOLIC BLOOD PRESSURE: 120 MMHG | WEIGHT: 201.75 LBS | BODY MASS INDEX: 33.61 KG/M2 | HEIGHT: 65 IN

## 2020-05-19 DIAGNOSIS — E11.59 HYPERTENSION ASSOCIATED WITH DIABETES: ICD-10-CM

## 2020-05-19 DIAGNOSIS — N52.9 ERECTILE DYSFUNCTION, UNSPECIFIED ERECTILE DYSFUNCTION TYPE: ICD-10-CM

## 2020-05-19 DIAGNOSIS — I25.118 CORONARY ARTERY DISEASE OF NATIVE ARTERY OF NATIVE HEART WITH STABLE ANGINA PECTORIS: ICD-10-CM

## 2020-05-19 DIAGNOSIS — E11.42 DIABETIC POLYNEUROPATHY ASSOCIATED WITH TYPE 2 DIABETES MELLITUS: ICD-10-CM

## 2020-05-19 DIAGNOSIS — E11.3312 MODERATE NONPROLIFERATIVE DIABETIC RETINOPATHY OF LEFT EYE WITH MACULAR EDEMA ASSOCIATED WITH TYPE 2 DIABETES MELLITUS: ICD-10-CM

## 2020-05-19 DIAGNOSIS — E11.65 UNCONTROLLED TYPE 2 DIABETES MELLITUS WITH HYPERGLYCEMIA: Primary | ICD-10-CM

## 2020-05-19 DIAGNOSIS — I15.2 HYPERTENSION ASSOCIATED WITH DIABETES: ICD-10-CM

## 2020-05-19 DIAGNOSIS — E11.69 HYPERLIPIDEMIA ASSOCIATED WITH TYPE 2 DIABETES MELLITUS: ICD-10-CM

## 2020-05-19 DIAGNOSIS — I50.32 CHRONIC DIASTOLIC HEART FAILURE: ICD-10-CM

## 2020-05-19 DIAGNOSIS — E21.5 DISORDER OF PARATHYROID GLAND: ICD-10-CM

## 2020-05-19 DIAGNOSIS — E29.1 HYPOGONADISM IN MALE: ICD-10-CM

## 2020-05-19 DIAGNOSIS — N18.30 STAGE 3 CHRONIC KIDNEY DISEASE: ICD-10-CM

## 2020-05-19 DIAGNOSIS — E66.9 OBESITY (BMI 30.0-34.9): ICD-10-CM

## 2020-05-19 DIAGNOSIS — E11.65 UNCONTROLLED TYPE 2 DIABETES MELLITUS WITH HYPERGLYCEMIA: ICD-10-CM

## 2020-05-19 DIAGNOSIS — E78.5 HYPERLIPIDEMIA ASSOCIATED WITH TYPE 2 DIABETES MELLITUS: ICD-10-CM

## 2020-05-19 LAB
ESTIMATED AVG GLUCOSE: 174 MG/DL (ref 68–131)
GLUCOSE SERPL-MCNC: 83 MG/DL (ref 70–110)
HBA1C MFR BLD HPLC: 7.7 % (ref 4–5.6)

## 2020-05-19 PROCEDURE — 82962 POCT GLUCOSE, HAND-HELD DEVICE: ICD-10-PCS | Mod: HCNC,S$GLB,, | Performed by: PHYSICIAN ASSISTANT

## 2020-05-19 PROCEDURE — 3051F HG A1C>EQUAL 7.0%<8.0%: CPT | Mod: HCNC,CPTII,S$GLB, | Performed by: PHYSICIAN ASSISTANT

## 2020-05-19 PROCEDURE — 3074F SYST BP LT 130 MM HG: CPT | Mod: HCNC,CPTII,S$GLB, | Performed by: PHYSICIAN ASSISTANT

## 2020-05-19 PROCEDURE — 3078F PR MOST RECENT DIASTOLIC BLOOD PRESSURE < 80 MM HG: ICD-10-PCS | Mod: HCNC,CPTII,S$GLB, | Performed by: PHYSICIAN ASSISTANT

## 2020-05-19 PROCEDURE — 99999 PR PBB SHADOW E&M-EST. PATIENT-LVL V: ICD-10-PCS | Mod: PBBFAC,HCNC,, | Performed by: PHYSICIAN ASSISTANT

## 2020-05-19 PROCEDURE — 36415 COLL VENOUS BLD VENIPUNCTURE: CPT | Mod: HCNC

## 2020-05-19 PROCEDURE — 99499 UNLISTED E&M SERVICE: CPT | Mod: HCNC,S$GLB,, | Performed by: PHYSICIAN ASSISTANT

## 2020-05-19 PROCEDURE — 99214 OFFICE O/P EST MOD 30 MIN: CPT | Mod: HCNC,S$GLB,, | Performed by: PHYSICIAN ASSISTANT

## 2020-05-19 PROCEDURE — 82962 GLUCOSE BLOOD TEST: CPT | Mod: HCNC,S$GLB,, | Performed by: PHYSICIAN ASSISTANT

## 2020-05-19 PROCEDURE — 3074F PR MOST RECENT SYSTOLIC BLOOD PRESSURE < 130 MM HG: ICD-10-PCS | Mod: HCNC,CPTII,S$GLB, | Performed by: PHYSICIAN ASSISTANT

## 2020-05-19 PROCEDURE — 3078F DIAST BP <80 MM HG: CPT | Mod: HCNC,CPTII,S$GLB, | Performed by: PHYSICIAN ASSISTANT

## 2020-05-19 PROCEDURE — 3008F BODY MASS INDEX DOCD: CPT | Mod: HCNC,CPTII,S$GLB, | Performed by: PHYSICIAN ASSISTANT

## 2020-05-19 PROCEDURE — 3008F PR BODY MASS INDEX (BMI) DOCUMENTED: ICD-10-PCS | Mod: HCNC,CPTII,S$GLB, | Performed by: PHYSICIAN ASSISTANT

## 2020-05-19 PROCEDURE — 99999 PR PBB SHADOW E&M-EST. PATIENT-LVL V: CPT | Mod: PBBFAC,HCNC,, | Performed by: PHYSICIAN ASSISTANT

## 2020-05-19 PROCEDURE — 3051F PR MOST RECENT HEMOGLOBIN A1C LEVEL 7.0 - < 8.0%: ICD-10-PCS | Mod: HCNC,CPTII,S$GLB, | Performed by: PHYSICIAN ASSISTANT

## 2020-05-19 PROCEDURE — 83036 HEMOGLOBIN GLYCOSYLATED A1C: CPT | Mod: HCNC

## 2020-05-19 PROCEDURE — 99499 RISK ADDL DX/OHS AUDIT: ICD-10-PCS | Mod: HCNC,S$GLB,, | Performed by: PHYSICIAN ASSISTANT

## 2020-05-19 PROCEDURE — 99214 PR OFFICE/OUTPT VISIT, EST, LEVL IV, 30-39 MIN: ICD-10-PCS | Mod: HCNC,S$GLB,, | Performed by: PHYSICIAN ASSISTANT

## 2020-05-19 RX ORDER — METFORMIN HYDROCHLORIDE 500 MG/1
1000 TABLET, EXTENDED RELEASE ORAL 2 TIMES DAILY WITH MEALS
Qty: 360 TABLET | Refills: 3 | Status: SHIPPED | OUTPATIENT
Start: 2020-05-19 | End: 2020-10-21 | Stop reason: SDUPTHER

## 2020-05-19 NOTE — PATIENT INSTRUCTIONS
Medication Changes:   - Change Lantus to 65 units in the morning  - Change Novolog (orange) 5 units with sliding scale as below three times a day before meals.   - Change Metformin to Metformin XR - will see if the 2 tabs twice a day helps with upset stomach, if not will decrease.   - Continue Glimepiride 2 mg with breakfast   - Change Ozempic 0.5 mg weekly     If blood sugar is below 70 eat first then check your blood sugar 2 hours later and make correction.  If blood pre-meal sugar is  70 -150 take 5 units of Novolog;  If blood pre-meal sugar is 151-200 take 7 units of Novolog;  If blood pre-meal sugar is 201-250 take 9 units of Novolog;  If blood pre-meal sugar is 251-300 take 11 units of Novolog;  If blood pre-meal sugar is 301-350 take 13 units of Novolog;  If blood pre-meal sugar is 351-400+ take 15 units of Novolog;  Also increase water intake and call for appointment.      -[employer] will call you about the continuous glucose monitor

## 2020-05-19 NOTE — PROGRESS NOTES
PCP: Mateo Lambert DO    Subjective:     Chief Complaint: Diabetes - Established Patient    HISTORY OF PRESENT ILLNESS: 64 y.o.   male presenting for diabetes management visit.   Patient has previously been established with the Diabetes Management Department and was last seen by Selene Fishman PA-C on 01/03/19.   Patient is new to me and is here for establishment of future care .   Patient has had Type II diabetes since 2000.  Pertinent to decision making is the following comorbidities: HTN, HLD, CAD, CHF, CKD III, Obesity by BMI, ED and Parathryoid disorder and Hypogonadism   Patient has the following Diabetes complications: with diabetic chronic kidney disease, with diabetic polyneuropathy and with diabetic retinopathy; mild non-proliferative; without macular edema  He  has attended diabetes education in the past.     Patient's most recent A1c of 7.5% was completed 5 months ago.   Patient states since His last A1c His blood glucose levels have been low in the morning / fasting.   Patient monitors blood glucose 4 times per day with meter : Fasting, Before Lunch, Before Dinner and Before Bed.   Patient blood glucose monitoring device will not be uploaded into [employer] today secondary to patient forgetting device.   Per patient recall, fasting blood sugars ranging from 50 - 80, before lunch ranging 130 - 140, before dinner 130 - 140, and before bed ranging 120s.   Patient endorses the following diabetes related symptoms: Diarrhea. This may be related to Metformin use.   Patient is due today for the following diabetes-related health maintenance standards: Urine Microalbumin/creatinine ratio and A1c.   He denies recent hospital admissions or emergency room visits.   He voices having hypoglycemia as above.    Patient's concerns today include glycemic control.   Patient medication regimen is as below.     CURRENT DM MEDICATIONS:    Lantus 70 units in the morning   Novolog 5 - 20 units TID wm  "   Metformin 1000 mg BID    Glimepiride 2 mg with breakfast    Ozempic 0.25 mg weekly     Patient has failed the following Diabetes medications:    Bydureon      Labs Reviewed.       Lab Results   Component Value Date    CPEPTIDE 4.37 07/20/2017     Lab Results   Component Value Date    GLUTAMICACID 0.01 07/20/2017       Height: 5' 5" (165.1 cm)  //  Weight: 91.5 kg (201 lb 11.5 oz), Body mass index is 33.57 kg/m².  His blood sugar in clinic today is:    Lab Results   Component Value Date    POCGLU 83 05/19/2020       Review of Systems   Constitutional: Negative for activity change, appetite change, chills and fever.   HENT: Negative for dental problem, mouth sores, nosebleeds, sore throat and trouble swallowing.    Eyes: Negative for pain and discharge.   Respiratory: Negative for shortness of breath, wheezing and stridor.    Cardiovascular: Negative for chest pain, palpitations and leg swelling.   Gastrointestinal: Negative for abdominal pain, diarrhea, nausea and vomiting.   Endocrine: Negative for polydipsia, polyphagia and polyuria.   Genitourinary: Negative for dysuria, frequency and urgency.   Musculoskeletal: Negative for joint swelling and myalgias.   Skin: Negative for rash and wound.   Neurological: Negative for dizziness, syncope, weakness and headaches.   Psychiatric/Behavioral: Negative for behavioral problems and dysphoric mood.         Diabetes Management Status  Statin: Taking  ACE/ARB: Taking    Screening or Prevention Patient's value Goal Complete/Controlled?   HgA1C Testing and Control   Lab Results   Component Value Date    HGBA1C 7.5 (H) 12/13/2019      Annually/Less than 8% Yes   Lipid profile : 09/09/2019 Annually Yes   LDL control Lab Results   Component Value Date    LDLCALC 64.2 09/09/2019    Annually/Less than 100 mg/dl  Yes   Nephropathy screening Lab Results   Component Value Date    MICALBCREAT 102.1 (H) 03/20/2019     Lab Results   Component Value Date    PROTEINUA Negative " 09/18/2018    Annually No   Blood pressure BP Readings from Last 1 Encounters:   05/19/20 120/71    Less than 140/90 Yes   Dilated retinal exam : 08/22/2019 Annually Yes    Foot exam   : 02/21/2020 Annually Yes     Social History     Socioeconomic History    Marital status: Legally      Spouse name: Not on file    Number of children: 5    Years of education: Not on file    Highest education level: Not on file   Occupational History    Occupation: disabled   Social Needs    Financial resource strain: Not on file    Food insecurity:     Worry: Not on file     Inability: Not on file    Transportation needs:     Medical: Not on file     Non-medical: Not on file   Tobacco Use    Smoking status: Never Smoker    Smokeless tobacco: Never Used   Substance and Sexual Activity    Alcohol use: No    Drug use: No    Sexual activity: Yes     Partners: Female   Lifestyle    Physical activity:     Days per week: Not on file     Minutes per session: Not on file    Stress: Rather much   Relationships    Social connections:     Talks on phone: Not on file     Gets together: Not on file     Attends Yazdanism service: Not on file     Active member of club or organization: Not on file     Attends meetings of clubs or organizations: Not on file     Relationship status: Not on file   Other Topics Concern    Not on file   Social History Narrative    Not on file     Past Medical History:   Diagnosis Date    Acute kidney injury     Arthritis     Asthma     Atrial fibrillation     Atrial fibrillation with RVR 8/7/2019    CHF (congestive heart failure)     Depression     Diabetes mellitus, type 2 Diagnosed in 2000    Elevated PSA     Hypertension     Sleep apnea        Objective:        Physical Exam   Constitutional: He is oriented to person, place, and time. He appears well-developed and well-nourished. No distress.   HENT:   Head: Normocephalic and atraumatic.   Right Ear: External ear normal.   Left  Ear: External ear normal.   Nose: Nose normal.   Eyes: Pupils are equal, round, and reactive to light. EOM are normal. Right eye exhibits no discharge. Left eye exhibits no discharge.   Neck: Normal range of motion. Neck supple.   Cardiovascular: Normal rate, regular rhythm, normal heart sounds and intact distal pulses.   Pulmonary/Chest: Effort normal and breath sounds normal.   Abdominal: Soft.   Musculoskeletal: Normal range of motion.   Neurological: He is alert and oriented to person, place, and time. He exhibits normal muscle tone. Coordination normal.   Skin: Skin is warm and dry. Capillary refill takes less than 2 seconds. He is not diaphoretic.   Psychiatric: He has a normal mood and affect. His behavior is normal. Judgment and thought content normal.         Assessment / Plan:     Uncontrolled type 2 diabetes mellitus with hyperglycemia  -     POCT Glucose, Hand-Held Device  -     metFORMIN (GLUCOPHAGE-XR) 500 MG XR 24hr tablet; Take 2 tablets (1,000 mg total) by mouth 2 (two) times daily with meals.  Dispense: 360 tablet; Refill: 3  -     Hemoglobin A1C; Standing  -     Ambulatory referral/consult to Diabetes Education; Future; Expected date: 05/26/2020  -     Discontinue: semaglutide (OZEMPIC) 0.25 mg or 0.5 mg(2 mg/1.5 mL) PnIj; Inject 0.5 mg into the skin every 7 days.  Dispense: 3 Syringe; Refill: 3    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 associated with type 2 diabetes mellitus    Diabetic polyneuropathy associated with type 2 diabetes mellitus    Stage 3 chronic kidney disease    Hypertension associated with diabetes    Hyperlipidemia associated with type 2 diabetes mellitus    Disorder of parathyroid gland    Hypogonadism in male    Chronic diastolic heart failure    Erectile dysfunction, unspecified erectile dysfunction type    Coronary artery disease of native artery of native heart with stable angina pectoris    Obesity (BMI 30.0-34.9)      Additional  Plan Details:    - POCT Glucose  - Encouraged continuation of lifestyle changes including regular exercise and limiting carbohydrates to 30-45 grams per meal threes times daily and 15 grams per snack with a limit of two daily.   - Encouraged continued monitoring of blood glucose with maintenance of 4 times daily at Fasting, Before Lunch, Before Dinner and Before Bed. Dexcom paperwork today.    - Current DM Medication Regimen: Change Lantus to 65 units in the morning. Change Novolog to 5 units with sliding scale as below three times a day before meals. Change Metformin to Metformin XR 1000 twice a day to see if helps with upset stomach, if not will decrease. Change Ozempic 0.5 mg weekly. Continue Glimepiride 2 mg with breakfast    -  Referral to CDE - Establish Care  - Health Maintenance standards addressed today: Urine Microalbumin / Creatinine Ratio  - Nursing Visit: Patient is age 79 or younger with an A1c of 7.5 or greater and will need nursing visit by Phone in 1 week for BG log review and insulin adjustment since patient unable or unwilling to come in for physical visit.   - Follow up in 6 weeks with A1c prior.      If blood sugar is below 70 eat first then check your blood sugar 2 hours later and make correction.  If blood pre-meal sugar is  70 -150 take 5 units of Novolog;  If blood pre-meal sugar is 151-200 take 7 units of Novolog;  If blood pre-meal sugar is 201-250 take 9 units of Novolog;  If blood pre-meal sugar is 251-300 take 11 units of Novolog;  If blood pre-meal sugar is 301-350 take 13 units of Novolog;  If blood pre-meal sugar is 351-400+ take 15 units of Novolog;  Also increase water intake and call for appointment.    Blakeney McKnight, PA-C [facility]    A total of 30 minutes was spent in face to face time, of which over 50 % was spent in counseling patient on disease process, complications, treatment, and side effects of medications.

## 2020-05-19 NOTE — TELEPHONE ENCOUNTER
----- Message from Al Yates sent at 5/19/2020  5:36 PM CDT -----  Contact: Anabelle/Ms.Claudia Dan called in and wanted to speak with the office regarding  upcoming appointment. She would like a call back from the office and can be reached at    [phone]

## 2020-05-21 ENCOUNTER — TELEPHONE (OUTPATIENT)
Dept: RADIOLOGY | Facility: HOSPITAL | Age: 65
End: 2020-05-21

## 2020-05-21 ENCOUNTER — OFFICE VISIT (OUTPATIENT)
Dept: INTERNAL MEDICINE | Facility: CLINIC | Age: 65
End: 2020-05-21
Payer: MEDICARE

## 2020-05-21 ENCOUNTER — LAB VISIT (OUTPATIENT)
Dept: LAB | Facility: HOSPITAL | Age: 65
End: 2020-05-21
Attending: FAMILY MEDICINE
Payer: MEDICARE

## 2020-05-21 VITALS
HEIGHT: 65 IN | RESPIRATION RATE: 12 BRPM | BODY MASS INDEX: 33.02 KG/M2 | OXYGEN SATURATION: 97 % | WEIGHT: 198.19 LBS | HEART RATE: 92 BPM | SYSTOLIC BLOOD PRESSURE: 111 MMHG | DIASTOLIC BLOOD PRESSURE: 67 MMHG

## 2020-05-21 DIAGNOSIS — M75.122 COMPLETE TEAR OF LEFT ROTATOR CUFF, UNSPECIFIED WHETHER TRAUMATIC: ICD-10-CM

## 2020-05-21 DIAGNOSIS — I15.2 HYPERTENSION ASSOCIATED WITH DIABETES: ICD-10-CM

## 2020-05-21 DIAGNOSIS — Z23 ENCOUNTER FOR VACCINATION: Primary | ICD-10-CM

## 2020-05-21 DIAGNOSIS — E11.69 HYPERLIPIDEMIA ASSOCIATED WITH TYPE 2 DIABETES MELLITUS: ICD-10-CM

## 2020-05-21 DIAGNOSIS — E78.5 HYPERLIPIDEMIA ASSOCIATED WITH TYPE 2 DIABETES MELLITUS: ICD-10-CM

## 2020-05-21 DIAGNOSIS — E11.59 HYPERTENSION ASSOCIATED WITH DIABETES: ICD-10-CM

## 2020-05-21 DIAGNOSIS — M47.26 OSTEOARTHRITIS OF SPINE WITH RADICULOPATHY, LUMBAR REGION: ICD-10-CM

## 2020-05-21 LAB
ALBUMIN SERPL BCP-MCNC: 3.5 G/DL (ref 3.5–5.2)
ALP SERPL-CCNC: 69 U/L (ref 55–135)
ALT SERPL W/O P-5'-P-CCNC: 13 U/L (ref 10–44)
ANION GAP SERPL CALC-SCNC: 9 MMOL/L (ref 8–16)
AST SERPL-CCNC: 13 U/L (ref 10–40)
BILIRUB SERPL-MCNC: 0.2 MG/DL (ref 0.1–1)
BUN SERPL-MCNC: 46 MG/DL (ref 8–23)
CALCIUM SERPL-MCNC: 8.7 MG/DL (ref 8.7–10.5)
CHLORIDE SERPL-SCNC: 105 MMOL/L (ref 95–110)
CO2 SERPL-SCNC: 26 MMOL/L (ref 23–29)
CREAT SERPL-MCNC: 1.6 MG/DL (ref 0.5–1.4)
EST. GFR  (AFRICAN AMERICAN): 52.6 ML/MIN/1.73 M^2
EST. GFR  (NON AFRICAN AMERICAN): 45.5 ML/MIN/1.73 M^2
GLUCOSE SERPL-MCNC: 79 MG/DL (ref 70–110)
POTASSIUM SERPL-SCNC: 4.6 MMOL/L (ref 3.5–5.1)
PROT SERPL-MCNC: 7.9 G/DL (ref 6–8.4)
SODIUM SERPL-SCNC: 140 MMOL/L (ref 136–145)

## 2020-05-21 PROCEDURE — 3008F BODY MASS INDEX DOCD: CPT | Mod: HCNC,CPTII,S$GLB, | Performed by: FAMILY MEDICINE

## 2020-05-21 PROCEDURE — 96372 PR INJECTION,THERAP/PROPH/DIAG2ST, IM OR SUBCUT: ICD-10-PCS | Mod: HCNC,S$GLB,, | Performed by: FAMILY MEDICINE

## 2020-05-21 PROCEDURE — 99999 PR PBB SHADOW E&M-EST. PATIENT-LVL III: CPT | Mod: PBBFAC,HCNC,, | Performed by: FAMILY MEDICINE

## 2020-05-21 PROCEDURE — 3078F DIAST BP <80 MM HG: CPT | Mod: HCNC,CPTII,S$GLB, | Performed by: FAMILY MEDICINE

## 2020-05-21 PROCEDURE — 3078F PR MOST RECENT DIASTOLIC BLOOD PRESSURE < 80 MM HG: ICD-10-PCS | Mod: HCNC,CPTII,S$GLB, | Performed by: FAMILY MEDICINE

## 2020-05-21 PROCEDURE — 3074F SYST BP LT 130 MM HG: CPT | Mod: HCNC,CPTII,S$GLB, | Performed by: FAMILY MEDICINE

## 2020-05-21 PROCEDURE — 80053 COMPREHEN METABOLIC PANEL: CPT | Mod: HCNC,PO

## 2020-05-21 PROCEDURE — 3051F PR MOST RECENT HEMOGLOBIN A1C LEVEL 7.0 - < 8.0%: ICD-10-PCS | Mod: HCNC,CPTII,S$GLB, | Performed by: FAMILY MEDICINE

## 2020-05-21 PROCEDURE — 99214 OFFICE O/P EST MOD 30 MIN: CPT | Mod: HCNC,25,S$GLB, | Performed by: FAMILY MEDICINE

## 2020-05-21 PROCEDURE — 3051F HG A1C>EQUAL 7.0%<8.0%: CPT | Mod: HCNC,CPTII,S$GLB, | Performed by: FAMILY MEDICINE

## 2020-05-21 PROCEDURE — 3074F PR MOST RECENT SYSTOLIC BLOOD PRESSURE < 130 MM HG: ICD-10-PCS | Mod: HCNC,CPTII,S$GLB, | Performed by: FAMILY MEDICINE

## 2020-05-21 PROCEDURE — 99214 PR OFFICE/OUTPT VISIT, EST, LEVL IV, 30-39 MIN: ICD-10-PCS | Mod: HCNC,25,S$GLB, | Performed by: FAMILY MEDICINE

## 2020-05-21 PROCEDURE — 36415 COLL VENOUS BLD VENIPUNCTURE: CPT | Mod: HCNC,PO

## 2020-05-21 PROCEDURE — 99999 PR PBB SHADOW E&M-EST. PATIENT-LVL III: ICD-10-PCS | Mod: PBBFAC,HCNC,, | Performed by: FAMILY MEDICINE

## 2020-05-21 PROCEDURE — 80061 LIPID PANEL: CPT | Mod: HCNC

## 2020-05-21 PROCEDURE — 3008F PR BODY MASS INDEX (BMI) DOCUMENTED: ICD-10-PCS | Mod: HCNC,CPTII,S$GLB, | Performed by: FAMILY MEDICINE

## 2020-05-21 PROCEDURE — 96372 THER/PROPH/DIAG INJ SC/IM: CPT | Mod: HCNC,S$GLB,, | Performed by: FAMILY MEDICINE

## 2020-05-21 RX ORDER — SEMAGLUTIDE 1.34 MG/ML
INJECTION, SOLUTION SUBCUTANEOUS
COMMUNITY
Start: 2020-02-24 | End: 2020-08-18 | Stop reason: SDUPTHER

## 2020-05-21 RX ORDER — KETOROLAC TROMETHAMINE 30 MG/ML
30 INJECTION, SOLUTION INTRAMUSCULAR; INTRAVENOUS
Status: COMPLETED | OUTPATIENT
Start: 2020-05-21 | End: 2020-05-21

## 2020-05-21 RX ADMIN — KETOROLAC TROMETHAMINE 30 MG: 30 INJECTION, SOLUTION INTRAMUSCULAR; INTRAVENOUS at 02:05

## 2020-05-21 NOTE — ASSESSMENT & PLAN NOTE
-patient taking baclofen and gabapentin with no relief. Requesting Norco. Gave one time IM of toradol. Told him to avoid home nsaids otherwise because of heart failure and reduced kidney function  -has lumbar mri tomorrow

## 2020-05-21 NOTE — PROGRESS NOTES
Patient ID: Crow Green is a 64 y.o. male.    Chief Complaint: Hip Pain and Shoulder Pain    HPI Patient is 64-year-old male presents with left shoulder and left hip pain.  Reports pain has worsened over last few weeks. He has known left rotator cuff tear and follows with ortho. Patient was not interested in surgery at last visit. Conservative measures thus far with shoulder injections and PT/OT.  Also has lumbar spondylosis and DDD, he follows with neurosurgery. Is scheduled for lumbar MRI on tomorrow. He reports compliance with Baclofen and Gabapentin. Reports left side hip pain that radiates down his leg. Pain worse with ambulation. No urinary or bowel incontinence. Left shoulder pain worse with range of motion.      Family History   Problem Relation Age of Onset    Glaucoma Mother     Cataracts Mother     Cataracts Father     Glaucoma Sister     Cataracts Sister     Glaucoma Maternal Aunt     No Known Problems Brother     No Known Problems Daughter     No Known Problems Son     Prostate cancer Neg Hx        Current Outpatient Medications:     ALCOHOL ANTISEPTIC PADS (ALCOHOL PREP PADS TOP), , Disp: , Rfl:     atorvastatin (LIPITOR) 40 MG tablet, TAKE 1 TABLET EVERY EVENING, Disp: 90 tablet, Rfl: 4    azaTHIOprine (IMURAN) 50 mg Tab, Take 1 tablet (50 mg total) by mouth once daily., Disp: 90 tablet, Rfl: 0    baclofen (LIORESAL) 10 MG tablet, Take 1 tablet (10 mg total) by mouth once daily., Disp: 90 tablet, Rfl: 3    blood sugar diagnostic (TRUE METRIX GLUCOSE TEST STRIP) Strp, Use with blood glucose meter kit to test blood sugar four times  daily, Disp: 200 strip, Rfl: 99    blood-glucose meter (TRUE METRIX AIR GLUCOSE METER) kit, TEST FOUR TIMES DAILY BEFORE MEALS  AND EVERY NIGHT, Disp: 1 each, Rfl: 0    diltiaZEM (CARDIZEM SR) 60 MG Cp12, Take 1 capsule (60 mg total) by mouth 2 (two) times daily., Disp: 60 capsule, Rfl: 11    ELIQUIS 5 mg Tab, Take 1 tablet (5 mg total) by mouth 2  (two) times daily., Disp: 60 tablet, Rfl: 0    fluticasone-salmeterol diskus inhaler 250-50 mcg, Inhale 1 puff into the lungs 2 (two) times daily. Controller, Disp: 60 each, Rfl: 11    food supplemt, lactose-reduced (ENSURE) Liqd, Take 118 mLs by mouth 3 (three) times daily with meals., Disp: 1 Bottle, Rfl: 4    furosemide (LASIX) 40 MG tablet, TAKE 2 TABLETS (80 MG TOTAL) EVERY MORNING AND 1 TABLET (40 MG TOTAL) EVERY EVENING., Disp: 270 tablet, Rfl: 0    gabapentin (NEURONTIN) 800 MG tablet, Take 1 tablet (800 mg total) by mouth 3 (three) times daily., Disp: 270 tablet, Rfl: 4    glimepiride (AMARYL) 2 MG tablet, TAKE 1 TABLET (2 MG TOTAL) BY MOUTH BEFORE BREAKFAST., Disp: 90 tablet, Rfl: 3    inhalation spacing device, Use as directed for inhalation., Disp: 1 Device, Rfl: 0    insulin (LANTUS SOLOSTAR U-100 INSULIN) glargine 100 units/mL (3mL) SubQ pen, INJECT 80 UNITS SUBCUTANEOUSLY EVERY EVENING, Disp: 75 mL, Rfl: 4    insulin aspart U-100 (NOVOLOG FLEXPEN U-100 INSULIN) 100 unit/mL (3 mL) InPn pen, INJECT 20 UNITS SUBCUTANEOUSLY THREE TIMES DAILY WITH MEALS, Disp: 15 mL, Rfl: 0    insulin syringe-needle U-100 1 mL 31 gauge x 5/16 Syrg, , Disp: , Rfl:     ipratropium (ATROVENT) 0.02 % nebulizer solution, USE 1 VIAL VIA NEBULIZER FOUR TIMES DAILY, Disp: 937.5 mL, Rfl: 2    isosorbide mononitrate (IMDUR) 60 MG 24 hr tablet, Take 1 tablet (60 mg total) by mouth once daily., Disp: 90 tablet, Rfl: 3    ketorolac (TORADOL) 10 mg tablet, Take 1 tablet (10 mg total) by mouth every 8 (eight) hours as needed for Pain., Disp: 20 tablet, Rfl: 0    lancets 32 gauge Misc, 1 lancet by Misc.(Non-Drug; Combo Route) route 2 (two) times daily., Disp: 100 each, Rfl: 11    latanoprost 0.005 % ophthalmic solution, INSTILL 1 DROP INTO BOTH EYES EVERY EVENING, Disp: 1 Bottle, Rfl: 2    lisinopril 10 MG tablet, Take 1 tablet (10 mg total) by mouth once daily., Disp: 90 tablet, Rfl: 3    metFORMIN (GLUCOPHAGE-XR) 500 MG  "XR 24hr tablet, Take 2 tablets (1,000 mg total) by mouth 2 (two) times daily with meals., Disp: 360 tablet, Rfl: 3    metoprolol succinate (TOPROL-XL) 50 MG 24 hr tablet, Take 1 tablet (50 mg total) by mouth once daily., Disp: 90 tablet, Rfl: 3    nitroGLYCERIN (NITROSTAT) 0.3 MG SL tablet, PLACE 1 TABLET UNDER THE TONGUE EVERY 5 MINUTES AS NEEDED FOR CHEST PAIN, NO MORE THAN 3 TABLETS IN ONE DAY, Disp: 100 tablet, Rfl: 0    OZEMPIC 1 mg/dose (2 mg/1.5 mL) PnIj, , Disp: , Rfl:     pantoprazole (PROTONIX) 40 MG tablet, Take 1 tablet (40 mg total) by mouth once daily., Disp: 30 tablet, Rfl: 11    pen needle, diabetic (BD ULTRA-FINE SHORT PEN NEEDLE) 31 gauge x 5/16" Ndle, Inject 1 each into the skin 3 (three) times daily., Disp: 100 each, Rfl: 11    predniSONE (DELTASONE) 5 MG tablet, Take 1 tablet (5 mg total) by mouth 2 (two) times daily., Disp: 60 tablet, Rfl: 0    sertraline (ZOLOFT) 100 MG tablet, Take 1 tablet (100 mg total) by mouth every evening., Disp: 90 tablet, Rfl: 4    sertraline (ZOLOFT) 50 MG tablet, TAKE 1 TABLET ONE TIME DAILY, Disp: 90 tablet, Rfl: 3    tamsulosin (FLOMAX) 0.4 mg Cap, Take 1 capsule (0.4 mg total) by mouth once daily., Disp: 30 capsule, Rfl: 11    tiotropium (SPIRIVA WITH HANDIHALER) 18 mcg inhalation capsule, Inhale 1 capsule (18 mcg total) into the lungs once daily. Controller, Disp: 90 capsule, Rfl: 0    traZODone (DESYREL) 100 MG tablet, Take 2 tablets (200 mg total) by mouth every evening., Disp: 180 tablet, Rfl: 1    VIOS AEROSOL DELIVERY SYSTEM Shefali, USE AS DIRESTED, Disp: , Rfl: 0    aspirin (ECOTRIN) 81 MG EC tablet, Take 1 tablet (81 mg total) by mouth once daily., Disp: , Rfl: 0    diphth,pertus,acell,,tetanus (BOOSTRIX) 2.5-8-5 Lf-mcg-Lf/0.5mL Susp, Inject 0.5 mLs into the muscle once. for 1 dose, Disp: 0.5 mL, Rfl: 0  No current facility-administered medications for this visit.     Review of Systems   Constitutional: Negative for chills and fever.   Eyes: " "Negative for visual disturbance.   Cardiovascular: Negative for chest pain.   Gastrointestinal: Negative for abdominal pain, diarrhea and nausea.   Musculoskeletal: Positive for arthralgias and back pain.   Psychiatric/Behavioral: Negative for sleep disturbance. The patient is not nervous/anxious.        Objective:   /67 (BP Location: Left arm, Patient Position: Sitting, BP Method: Medium (Automatic))   Pulse 92   Resp 12   Ht 5' 5" (1.651 m)   Wt 89.9 kg (198 lb 3.1 oz)   SpO2 97%   BMI 32.98 kg/m²      Physical Exam   Constitutional: He is oriented to person, place, and time. He appears well-developed and well-nourished. No distress.   HENT:   Head: Normocephalic and atraumatic.   Eyes: Conjunctivae are normal.   Neck: Normal range of motion.   Cardiovascular: Normal rate, regular rhythm and normal heart sounds.   Pulmonary/Chest: Effort normal and breath sounds normal.   Abdominal: Soft.   Musculoskeletal: Normal range of motion. He exhibits tenderness (left paraspinal lumbar and hip). He exhibits no edema or deformity.   Left rotator cuff, ttp. Decreased ROM, abduction   Neurological: He is alert and oriented to person, place, and time.   Skin: Skin is warm. He is not diaphoretic.   Psychiatric: He has a normal mood and affect. His behavior is normal.       Assessment & Plan     Problem List Items Addressed This Visit        Neuro    Osteoarthritis of spine with radiculopathy, lumbar region    Current Assessment & Plan     -patient taking baclofen and gabapentin with no relief. Requesting Norco. Gave one time IM of toradol. Told him to avoid home nsaids otherwise because of heart failure and reduced kidney function  -has lumbar mri tomorrow         Relevant Medications    ketorolac injection 30 mg (Completed)       Cardiac/Vascular    Hypertension associated with diabetes    Current Assessment & Plan     -bp controlled. Continue current meds         Relevant Medications    OZEMPIC 1 mg/dose (2 mg/1.5 " mL) PnIj    Other Relevant Orders    Comprehensive metabolic panel (Completed)    Hyperlipidemia associated with type 2 diabetes mellitus    Current Assessment & Plan     -takes atorvastatin         Relevant Medications    OZEMPIC 1 mg/dose (2 mg/1.5 mL) PnIj    Other Relevant Orders    Lipid Panel       Orthopedic    Complete tear of left rotator cuff    Current Assessment & Plan     -Has upcoming appt with ortho for shoulder.           Other Visit Diagnoses     Encounter for vaccination    -  Primary    Relevant Medications    diphth,pertus,acell,,tetanus (BOOSTRIX) 2.5-8-5 Lf-mcg-Lf/0.5mL Susp            Disclaimer:  This note may have been prepared using voice recognition software, without extensive proofreading. As such, there could be errors within the text such as sound alike errors.

## 2020-05-22 ENCOUNTER — HOSPITAL ENCOUNTER (OUTPATIENT)
Dept: RADIOLOGY | Facility: HOSPITAL | Age: 65
Discharge: HOME | End: 2020-05-22
Attending: PHYSICIAN ASSISTANT
Payer: MEDICARE

## 2020-05-22 DIAGNOSIS — M54.50 LOW BACK PAIN, NON-SPECIFIC: ICD-10-CM

## 2020-05-22 LAB
CHOLEST SERPL-MCNC: 128 MG/DL (ref 120–199)
CHOLEST/HDLC SERPL: 2.8 {RATIO} (ref 2–5)
HDLC SERPL-MCNC: 46 MG/DL (ref 40–75)
HDLC SERPL: 35.9 % (ref 20–50)
LDLC SERPL CALC-MCNC: 62.4 MG/DL (ref 63–159)
NONHDLC SERPL-MCNC: 82 MG/DL
TRIGL SERPL-MCNC: 98 MG/DL (ref 30–150)

## 2020-05-22 PROCEDURE — 72148 MRI LUMBAR SPINE W/O DYE: CPT | Mod: 26,HCNC,, | Performed by: RADIOLOGY

## 2020-05-22 PROCEDURE — 72148 MRI LUMBAR SPINE W/O DYE: CPT | Mod: TC,HCNC

## 2020-05-22 PROCEDURE — 72148 MRI LUMBAR SPINE WITHOUT CONTRAST: ICD-10-PCS | Mod: 26,HCNC,, | Performed by: RADIOLOGY

## 2020-05-25 ENCOUNTER — TELEPHONE (OUTPATIENT)
Dept: PHARMACY | Facility: CLINIC | Age: 65
End: 2020-05-25

## 2020-05-26 ENCOUNTER — CLINICAL SUPPORT (OUTPATIENT)
Dept: DIABETES | Facility: CLINIC | Age: 65
End: 2020-05-26
Payer: MEDICARE

## 2020-05-26 ENCOUNTER — TELEPHONE (OUTPATIENT)
Dept: DIABETES | Facility: CLINIC | Age: 65
End: 2020-05-26

## 2020-05-26 NOTE — TELEPHONE ENCOUNTER
----- Message from Eduardo Castillo PA-C sent at 5/26/2020  3:38 PM CDT -----  The following labs have been reviewed and interpreted as following:     A1c: Mild regression. Continue working on glucose control

## 2020-05-27 ENCOUNTER — EXTERNAL HOME HEALTH (OUTPATIENT)
Dept: HOME HEALTH SERVICES | Facility: HOSPITAL | Age: 65
End: 2020-05-27
Payer: MEDICARE

## 2020-05-27 ENCOUNTER — TELEPHONE (OUTPATIENT)
Dept: INTERNAL MEDICINE | Facility: CLINIC | Age: 65
End: 2020-05-27

## 2020-05-27 ENCOUNTER — PATIENT OUTREACH (OUTPATIENT)
Dept: ADMINISTRATIVE | Facility: OTHER | Age: 65
End: 2020-05-27

## 2020-05-27 NOTE — PROGRESS NOTES
60 Day Recert Order # [id number]  New Cert Period: 05/01/2020 to 06/29/2020 with [facility] - [facility] - Dr. Mateo Lambert

## 2020-05-27 NOTE — TELEPHONE ENCOUNTER
----- Message from Mel Carrasco sent at 5/27/2020  3:23 PM CDT -----  Contact: Elayne flores/justina roberts [phone]   Caller is calling  to consult with nurse about pt who have an open investigation with them  Please call Elayne roberts [phone] back. thanks T/P

## 2020-05-28 ENCOUNTER — OFFICE VISIT (OUTPATIENT)
Dept: OPHTHALMOLOGY | Facility: CLINIC | Age: 65
End: 2020-05-28
Payer: MEDICARE

## 2020-05-28 DIAGNOSIS — H40.1132 PRIMARY OPEN ANGLE GLAUCOMA (POAG) OF BOTH EYES, MODERATE STAGE: Primary | ICD-10-CM

## 2020-05-28 DIAGNOSIS — H25.013 CORTICAL AGE-RELATED CATARACT OF BOTH EYES: ICD-10-CM

## 2020-05-28 DIAGNOSIS — H52.03 HYPEROPIA WITH ASTIGMATISM AND PRESBYOPIA, BILATERAL: ICD-10-CM

## 2020-05-28 DIAGNOSIS — E11.3312 MODERATE NONPROLIFERATIVE DIABETIC RETINOPATHY OF LEFT EYE WITH MACULAR EDEMA ASSOCIATED WITH TYPE 2 DIABETES MELLITUS: ICD-10-CM

## 2020-05-28 DIAGNOSIS — H52.203 HYPEROPIA WITH ASTIGMATISM AND PRESBYOPIA, BILATERAL: ICD-10-CM

## 2020-05-28 DIAGNOSIS — H52.4 HYPEROPIA WITH ASTIGMATISM AND PRESBYOPIA, BILATERAL: ICD-10-CM

## 2020-05-28 DIAGNOSIS — H25.13 NUCLEAR SCLEROSIS, BILATERAL: ICD-10-CM

## 2020-05-28 PROCEDURE — 92133 CPTRZD OPH DX IMG PST SGM ON: CPT | Mod: HCNC,S$GLB,, | Performed by: OPTOMETRIST

## 2020-05-28 PROCEDURE — 92015 PR REFRACTION: ICD-10-PCS | Mod: HCNC,S$GLB,, | Performed by: OPTOMETRIST

## 2020-05-28 PROCEDURE — 92015 DETERMINE REFRACTIVE STATE: CPT | Mod: HCNC,S$GLB,, | Performed by: OPTOMETRIST

## 2020-05-28 PROCEDURE — 99999 PR PBB SHADOW E&M-EST. PATIENT-LVL I: ICD-10-PCS | Mod: PBBFAC,HCNC,, | Performed by: OPTOMETRIST

## 2020-05-28 PROCEDURE — 92014 COMPRE OPH EXAM EST PT 1/>: CPT | Mod: HCNC,S$GLB,, | Performed by: OPTOMETRIST

## 2020-05-28 PROCEDURE — 92133 POSTERIOR SEGMENT OCT OPTIC NERVE(OCULAR COHERENCE TOMOGRAPHY) - OU - BOTH EYES: ICD-10-PCS | Mod: HCNC,S$GLB,, | Performed by: OPTOMETRIST

## 2020-05-28 PROCEDURE — 99999 PR PBB SHADOW E&M-EST. PATIENT-LVL I: CPT | Mod: PBBFAC,HCNC,, | Performed by: OPTOMETRIST

## 2020-05-28 PROCEDURE — 92014 PR EYE EXAM, EST PATIENT,COMPREHESV: ICD-10-PCS | Mod: HCNC,S$GLB,, | Performed by: OPTOMETRIST

## 2020-05-28 NOTE — PROGRESS NOTES
Chart reviewed.   Immunizations: Triggered Imm Registry     Orders placed: n/a  Upcoming appts to satisfy REJI topics: n/a

## 2020-05-28 NOTE — PROGRESS NOTES
"HPI     Patient last visit with [facility] on 08/22/2019.  Medication eye drops if any: Latanoprost OU QHS  Last HVF: 05/16/2019  Last gOCT: 05/28/2020  Last SDP: none     Diabetic eye exam  Diagnosed with diabetes 15 years ago  Recent vision fluctuations No  Lab Results       Component                Value               Date                       HGBA1C                   7.7 (H)             05/19/2020              HPI    Any vision changes since last exam: Yes, seeing double vision only occasionally while watching TV  Does not see double today  "if I refocus, it gets single"    Eye pain: the corner of the left eye yesterday, today no pain  Other ocular symptoms: none    Do you wear currently wear glasses or contacts? Glasses    Interested in contacts today? No    Do you plan on getting new glasses today? Yes, if needed        Last edited by Kwame Stephens, OD on 5/28/2020  9:17 AM. (History)            Assessment /Plan     For exam results, see Encounter Report.    Primary open angle glaucoma (POAG) of both eyes, moderate stage  -     Posterior Segment OCT Optic Nerve- Both eyes    IOP stable today OU with thicker pachs  Some progression noted on NFL scan OD>OS; this could be due to at posterior pole related to BDR  Continue Latanoprost qhs OU  monitor    Moderate nonproliferative diabetic retinopathy of both eyes with macular edema associated with type 2 diabetes mellitus  Mac edema OS  Consult Dr Capps.    Nuclear sclerosis, bilateral  Cortical age-related cataract of both eyes  Surgery is not indicated at this time.   Monitor 12 months.    Hyperopia with astigmatism and presbyopia, bilateral  Eyeglass Final Rx     Eyeglass Final Rx       Sphere Cylinder Axis Add    Right +1.00 +0.25 040 +2.50    Left +1.00 +0.50 150 +2.50    Expiration Date:  5/29/2021                  RTC next available with [facility] for consult or PRN  Discussed above and all questions were answered.                    "

## 2020-05-29 ENCOUNTER — OFFICE VISIT (OUTPATIENT)
Dept: ORTHOPEDICS | Facility: CLINIC | Age: 65
End: 2020-05-29
Payer: MEDICARE

## 2020-05-29 VITALS — WEIGHT: 198 LBS | BODY MASS INDEX: 32.99 KG/M2 | HEIGHT: 65 IN

## 2020-05-29 DIAGNOSIS — S46.012D TRAUMATIC COMPLETE TEAR OF LEFT ROTATOR CUFF, SUBSEQUENT ENCOUNTER: Primary | ICD-10-CM

## 2020-05-29 DIAGNOSIS — M25.511 CHRONIC RIGHT SHOULDER PAIN: ICD-10-CM

## 2020-05-29 DIAGNOSIS — M25.512 LEFT SHOULDER PAIN, UNSPECIFIED CHRONICITY: Primary | ICD-10-CM

## 2020-05-29 DIAGNOSIS — G89.29 CHRONIC RIGHT SHOULDER PAIN: ICD-10-CM

## 2020-05-29 DIAGNOSIS — I50.22 CHRONIC SYSTOLIC CONGESTIVE HEART FAILURE: ICD-10-CM

## 2020-05-29 PROCEDURE — 99999 PR PBB SHADOW E&M-EST. PATIENT-LVL III: ICD-10-PCS | Mod: PBBFAC,HCNC,, | Performed by: FAMILY MEDICINE

## 2020-05-29 PROCEDURE — 99213 OFFICE O/P EST LOW 20 MIN: CPT | Mod: HCNC,S$GLB,, | Performed by: FAMILY MEDICINE

## 2020-05-29 PROCEDURE — 3008F BODY MASS INDEX DOCD: CPT | Mod: HCNC,CPTII,S$GLB, | Performed by: FAMILY MEDICINE

## 2020-05-29 PROCEDURE — 3051F HG A1C>EQUAL 7.0%<8.0%: CPT | Mod: HCNC,CPTII,S$GLB, | Performed by: FAMILY MEDICINE

## 2020-05-29 PROCEDURE — 99999 PR PBB SHADOW E&M-EST. PATIENT-LVL III: CPT | Mod: PBBFAC,HCNC,, | Performed by: FAMILY MEDICINE

## 2020-05-29 PROCEDURE — 3008F PR BODY MASS INDEX (BMI) DOCUMENTED: ICD-10-PCS | Mod: HCNC,CPTII,S$GLB, | Performed by: FAMILY MEDICINE

## 2020-05-29 PROCEDURE — 3051F PR MOST RECENT HEMOGLOBIN A1C LEVEL 7.0 - < 8.0%: ICD-10-PCS | Mod: HCNC,CPTII,S$GLB, | Performed by: FAMILY MEDICINE

## 2020-05-29 PROCEDURE — 99213 PR OFFICE/OUTPT VISIT, EST, LEVL III, 20-29 MIN: ICD-10-PCS | Mod: HCNC,S$GLB,, | Performed by: FAMILY MEDICINE

## 2020-05-29 RX ORDER — DICLOFENAC SODIUM 10 MG/G
2 GEL TOPICAL 4 TIMES DAILY
Qty: 1 TUBE | Refills: 5 | Status: SHIPPED | OUTPATIENT
Start: 2020-05-29 | End: 2021-07-26

## 2020-05-29 NOTE — TELEPHONE ENCOUNTER
Pt contacted [facility] on  regarding Imuran refill. Pt states that he has 0 doses on hand. Pt confirmed shipping of Imuran on  to arrive on . Address and  verified. $0 copay in 004. Pt denies any side effects. Pt did not start any new medications. Pt had no further questions or concerns.

## 2020-05-29 NOTE — PROGRESS NOTES
Subjective:     Patient ID: Crow Green is a 64 y.o. male.    Chief Complaint: Pain of the Left Shoulder      HPI:  Patient has ongoing pain in the left shoulder associated with popping and reduced range of motion.  He had an MRI December 13, 2019 which showed several massive tear is a of rotator cuff tendinopathy.    The patient is not had operative procedure but this possibility had been discussed with him apparently and he would like to discuss this further with an orthopedic surgeon.    No recent fever weight loss chills or shortness of breath.  The patient has multiple comorbidities but feels that he is in good enough condition to be willing to go through shoulder surgery if it is recommended.    Past Medical History:   Diagnosis Date    Acute kidney injury     Arthritis     Asthma     Atrial fibrillation     Atrial fibrillation with RVR 8/7/2019    CHF (congestive heart failure)     Depression     Diabetes mellitus, type 2 Diagnosed in 2000    Elevated PSA     Hypertension     Sleep apnea      Past Surgical History:   Procedure Laterality Date    ABLATION OF ARRHYTHMOGENIC FOCUS FOR ATRIAL FIBRILLATION N/A 2/27/2020    Procedure: Ablation atrial fibrillation;  Surgeon: Gio Brown MD;  Location: [facility];  Service: Cardiology;  Laterality: N/A;  afib, DAMIEN (cx if SR), PVI, PITO, anes, MB, 3 Prep    CHOLECYSTECTOMY       Family History   Problem Relation Age of Onset    Glaucoma Mother     Cataracts Mother     Cataracts Father     Glaucoma Sister     Cataracts Sister     Glaucoma Maternal Aunt     No Known Problems Brother     No Known Problems Daughter     No Known Problems Son     Prostate cancer Neg Hx      Social History     Socioeconomic History    Marital status: Legally      Spouse name: Not on file    Number of children: 5    Years of education: Not on file    Highest education level: Not on file   Occupational History    Occupation: disabled    Social Needs    Financial resource strain: Not on file    Food insecurity:     Worry: Not on file     Inability: Not on file    Transportation needs:     Medical: Not on file     Non-medical: Not on file   Tobacco Use    Smoking status: Never Smoker    Smokeless tobacco: Never Used   Substance and Sexual Activity    Alcohol use: No     Comment: occ    Drug use: No    Sexual activity: Yes     Partners: Female   Lifestyle    Physical activity:     Days per week: Not on file     Minutes per session: Not on file    Stress: Rather much   Relationships    Social connections:     Talks on phone: Not on file     Gets together: Not on file     Attends Congregational service: Not on file     Active member of club or organization: Not on file     Attends meetings of clubs or organizations: Not on file     Relationship status: Not on file   Other Topics Concern    Not on file   Social History Narrative    Not on file       Current Outpatient Medications:     ALCOHOL ANTISEPTIC PADS (ALCOHOL PREP PADS TOP), , Disp: , Rfl:     atorvastatin (LIPITOR) 40 MG tablet, TAKE 1 TABLET EVERY EVENING, Disp: 90 tablet, Rfl: 4    azaTHIOprine (IMURAN) 50 mg Tab, Take 1 tablet (50 mg total) by mouth once daily., Disp: 90 tablet, Rfl: 0    baclofen (LIORESAL) 10 MG tablet, Take 1 tablet (10 mg total) by mouth once daily., Disp: 90 tablet, Rfl: 3    blood sugar diagnostic (TRUE METRIX GLUCOSE TEST STRIP) Strp, Use with blood glucose meter kit to test blood sugar four times  daily, Disp: 200 strip, Rfl: 99    blood-glucose meter (TRUE METRIX AIR GLUCOSE METER) kit, TEST FOUR TIMES DAILY BEFORE MEALS  AND EVERY NIGHT, Disp: 1 each, Rfl: 0    diltiaZEM (CARDIZEM SR) 60 MG Cp12, Take 1 capsule (60 mg total) by mouth 2 (two) times daily., Disp: 60 capsule, Rfl: 11    ELIQUIS 5 mg Tab, Take 1 tablet (5 mg total) by mouth 2 (two) times daily., Disp: 60 tablet, Rfl: 0    fluticasone-salmeterol diskus inhaler 250-50 mcg, Inhale 1 puff  into the lungs 2 (two) times daily. Controller, Disp: 60 each, Rfl: 11    food supplemt, lactose-reduced (ENSURE) Liqd, Take 118 mLs by mouth 3 (three) times daily with meals., Disp: 1 Bottle, Rfl: 4    furosemide (LASIX) 40 MG tablet, TAKE 2 TABLETS (80 MG TOTAL) EVERY MORNING AND 1 TABLET (40 MG TOTAL) EVERY EVENING., Disp: 270 tablet, Rfl: 0    gabapentin (NEURONTIN) 800 MG tablet, Take 1 tablet (800 mg total) by mouth 3 (three) times daily., Disp: 270 tablet, Rfl: 4    glimepiride (AMARYL) 2 MG tablet, TAKE 1 TABLET (2 MG TOTAL) BY MOUTH BEFORE BREAKFAST., Disp: 90 tablet, Rfl: 3    inhalation spacing device, Use as directed for inhalation., Disp: 1 Device, Rfl: 0    insulin (LANTUS SOLOSTAR U-100 INSULIN) glargine 100 units/mL (3mL) SubQ pen, INJECT 80 UNITS SUBCUTANEOUSLY EVERY EVENING, Disp: 75 mL, Rfl: 4    insulin aspart U-100 (NOVOLOG FLEXPEN U-100 INSULIN) 100 unit/mL (3 mL) InPn pen, INJECT 20 UNITS SUBCUTANEOUSLY THREE TIMES DAILY WITH MEALS, Disp: 15 mL, Rfl: 0    insulin syringe-needle U-100 1 mL 31 gauge x 5/16 Syrg, , Disp: , Rfl:     ipratropium (ATROVENT) 0.02 % nebulizer solution, USE 1 VIAL VIA NEBULIZER FOUR TIMES DAILY, Disp: 937.5 mL, Rfl: 2    isosorbide mononitrate (IMDUR) 60 MG 24 hr tablet, Take 1 tablet (60 mg total) by mouth once daily., Disp: 90 tablet, Rfl: 3    ketorolac (TORADOL) 10 mg tablet, Take 1 tablet (10 mg total) by mouth every 8 (eight) hours as needed for Pain., Disp: 20 tablet, Rfl: 0    lancets 32 gauge Misc, 1 lancet by Misc.(Non-Drug; Combo Route) route 2 (two) times daily., Disp: 100 each, Rfl: 11    latanoprost 0.005 % ophthalmic solution, INSTILL 1 DROP INTO BOTH EYES EVERY EVENING, Disp: 1 Bottle, Rfl: 2    lisinopril 10 MG tablet, Take 1 tablet (10 mg total) by mouth once daily., Disp: 90 tablet, Rfl: 3    metFORMIN (GLUCOPHAGE-XR) 500 MG XR 24hr tablet, Take 2 tablets (1,000 mg total) by mouth 2 (two) times daily with meals., Disp: 360 tablet, Rfl:  "3    metoprolol succinate (TOPROL-XL) 50 MG 24 hr tablet, Take 1 tablet (50 mg total) by mouth once daily., Disp: 90 tablet, Rfl: 3    nitroGLYCERIN (NITROSTAT) 0.3 MG SL tablet, PLACE 1 TABLET UNDER THE TONGUE EVERY 5 MINUTES AS NEEDED FOR CHEST PAIN, NO MORE THAN 3 TABLETS IN ONE DAY, Disp: 100 tablet, Rfl: 0    OZEMPIC 1 mg/dose (2 mg/1.5 mL) PnIj, , Disp: , Rfl:     pantoprazole (PROTONIX) 40 MG tablet, Take 1 tablet (40 mg total) by mouth once daily., Disp: 30 tablet, Rfl: 11    pen needle, diabetic (BD ULTRA-FINE SHORT PEN NEEDLE) 31 gauge x 5/16" Ndle, Inject 1 each into the skin 3 (three) times daily., Disp: 100 each, Rfl: 11    predniSONE (DELTASONE) 5 MG tablet, Take 1 tablet (5 mg total) by mouth 2 (two) times daily., Disp: 60 tablet, Rfl: 0    sertraline (ZOLOFT) 100 MG tablet, Take 1 tablet (100 mg total) by mouth every evening., Disp: 90 tablet, Rfl: 4    sertraline (ZOLOFT) 50 MG tablet, TAKE 1 TABLET ONE TIME DAILY, Disp: 90 tablet, Rfl: 3    tamsulosin (FLOMAX) 0.4 mg Cap, Take 1 capsule (0.4 mg total) by mouth once daily., Disp: 30 capsule, Rfl: 11    tiotropium (SPIRIVA WITH HANDIHALER) 18 mcg inhalation capsule, Inhale 1 capsule (18 mcg total) into the lungs once daily. Controller, Disp: 90 capsule, Rfl: 0    traZODone (DESYREL) 100 MG tablet, Take 2 tablets (200 mg total) by mouth every evening., Disp: 180 tablet, Rfl: 1    VIOS AEROSOL DELIVERY SYSTEM Shefali, USE AS DIRESTED, Disp: , Rfl: 0    aspirin (ECOTRIN) 81 MG EC tablet, Take 1 tablet (81 mg total) by mouth once daily., Disp: , Rfl: 0    diclofenac sodium (VOLTAREN) 1 % Gel, Apply 2 g topically 4 (four) times daily., Disp: 1 Tube, Rfl: 5  Review of patient's allergies indicates:   Allergen Reactions    Protamine Hives     Urticaria, possible upper airway swelling     Review of Systems   Constitutional: Negative for chills, fever and weight loss.   Respiratory: Negative for shortness of breath.    Cardiovascular: Negative for " chest pain and palpitations.       Objective:   Body mass index is 32.95 kg/m².  There were no vitals filed for this visit.        Ortho/SPM Exam alert and oriented well-nourished well-developed ambulatory and in no acute distress    Respiratory effort is normal    Left shoulder-no acute deformity but some generalized atrophy noted    Full exam not done due the patient's apprehension in level of pain in obvious restriction of motion    Patient has abduction to 30° and flexion to 40°    He cannot maintain empty can position or reach behind his low back or behind his head    He has diffuse tenderness to palpation of the anterior lateral posterior shoulder    Neurovascular intact for the shoulder musculature    Psychiatric normal affect and cognition    IMAGING: Posterior Segment OCT Optic Nerve- Both eyes  Right Eye  Quality was good. Scan locations included Optic Nerve Head, Retinal Nerve   Fiber Layer (RNFL).     Left Eye  Quality was good. Scan locations included Optic Nerve Head, Retinal Nerve   Fiber Layer (RNFL).     Findings  Right Eye  Low. TS, NS, N, General, TI. Retinal Nerve Fiber Layer Thinning was nasal.   Yes. Progression has worsened.     Left Eye  Borderline. N. Retinal Nerve Fiber Layer Thinning was nasal. Yes.   Progression has been stable.        Radiographs / Imaging : Relevant imaging results reviewed by me and interpreted by me, discussed with the patient and / or family       Assessment:     Encounter Diagnoses   Name Primary?    Traumatic complete tear of left rotator cuff, subsequent encounter Yes    Chronic right shoulder pain     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, with long-term current use of insulin     Chronic systolic congestive heart failure         Plan:   Traumatic complete tear of left rotator cuff, subsequent encounter    Chronic right shoulder pain    Uncontrolled type 2 diabetes mellitus with mild  nonproliferative retinopathy without macular edema, with long-term current use of insulin    Chronic systolic congestive heart failure        The patient verbalized good understanding of the medical issues discussed today and expressed appreciation for the care provided.  Patient was given the opportunity to ask questions and be an active participant in their medical care. Patient had no further questions or concerns at this time.     The patient was encouraged to participate in appropriate physical activity.      Cisco Luciano M.D.  [facility]        This note was dictated using voice recognition software and may have sound a like errors.

## 2020-06-02 ENCOUNTER — TELEPHONE (OUTPATIENT)
Dept: PAIN MEDICINE | Facility: CLINIC | Age: 65
End: 2020-06-02

## 2020-06-02 ENCOUNTER — OFFICE VISIT (OUTPATIENT)
Dept: NEUROSURGERY | Facility: CLINIC | Age: 65
End: 2020-06-02
Payer: MEDICARE

## 2020-06-02 ENCOUNTER — TELEPHONE (OUTPATIENT)
Dept: ORTHOPEDICS | Facility: CLINIC | Age: 65
End: 2020-06-02

## 2020-06-02 ENCOUNTER — PATIENT OUTREACH (OUTPATIENT)
Dept: ADMINISTRATIVE | Facility: OTHER | Age: 65
End: 2020-06-02

## 2020-06-02 VITALS
HEIGHT: 65 IN | BODY MASS INDEX: 32.99 KG/M2 | WEIGHT: 198 LBS | DIASTOLIC BLOOD PRESSURE: 92 MMHG | HEART RATE: 87 BPM | SYSTOLIC BLOOD PRESSURE: 135 MMHG

## 2020-06-02 DIAGNOSIS — M54.16 LUMBAR RADICULOPATHY: ICD-10-CM

## 2020-06-02 DIAGNOSIS — M54.16 LUMBAR RADICULITIS: ICD-10-CM

## 2020-06-02 DIAGNOSIS — Z03.818 ENCOUNTER FOR OBSERVATION FOR SUSPECTED EXPOSURE TO OTHER BIOLOGICAL AGENTS RULED OUT: Primary | ICD-10-CM

## 2020-06-02 DIAGNOSIS — M51.36 DEGENERATIVE DISC DISEASE, LUMBAR: Primary | ICD-10-CM

## 2020-06-02 DIAGNOSIS — M47.816 LUMBAR SPONDYLOSIS: Primary | ICD-10-CM

## 2020-06-02 PROCEDURE — 99999 PR PBB SHADOW E&M-EST. PATIENT-LVL III: CPT | Mod: PBBFAC,HCNC,, | Performed by: NEUROLOGICAL SURGERY

## 2020-06-02 PROCEDURE — 3008F BODY MASS INDEX DOCD: CPT | Mod: HCNC,CPTII,S$GLB, | Performed by: NEUROLOGICAL SURGERY

## 2020-06-02 PROCEDURE — 3075F PR MOST RECENT SYSTOLIC BLOOD PRESS GE 130-139MM HG: ICD-10-PCS | Mod: HCNC,CPTII,S$GLB, | Performed by: NEUROLOGICAL SURGERY

## 2020-06-02 PROCEDURE — 3080F DIAST BP >= 90 MM HG: CPT | Mod: HCNC,CPTII,S$GLB, | Performed by: NEUROLOGICAL SURGERY

## 2020-06-02 PROCEDURE — 3080F PR MOST RECENT DIASTOLIC BLOOD PRESSURE >= 90 MM HG: ICD-10-PCS | Mod: HCNC,CPTII,S$GLB, | Performed by: NEUROLOGICAL SURGERY

## 2020-06-02 PROCEDURE — 99999 PR PBB SHADOW E&M-EST. PATIENT-LVL III: ICD-10-PCS | Mod: PBBFAC,HCNC,, | Performed by: NEUROLOGICAL SURGERY

## 2020-06-02 PROCEDURE — 3008F PR BODY MASS INDEX (BMI) DOCUMENTED: ICD-10-PCS | Mod: HCNC,CPTII,S$GLB, | Performed by: NEUROLOGICAL SURGERY

## 2020-06-02 PROCEDURE — 99213 OFFICE O/P EST LOW 20 MIN: CPT | Mod: HCNC,S$GLB,, | Performed by: NEUROLOGICAL SURGERY

## 2020-06-02 PROCEDURE — 3075F SYST BP GE 130 - 139MM HG: CPT | Mod: HCNC,CPTII,S$GLB, | Performed by: NEUROLOGICAL SURGERY

## 2020-06-02 PROCEDURE — 99213 PR OFFICE/OUTPT VISIT, EST, LEVL III, 20-29 MIN: ICD-10-PCS | Mod: HCNC,S$GLB,, | Performed by: NEUROLOGICAL SURGERY

## 2020-06-02 NOTE — H&P (VIEW-ONLY)
Subjective:      Patient ID: Crow Green is a 64 y.o. male.    Chief Complaint: Lumbar Spine Pain (L-Spine)    Patient is here today for follow-up evaluation with an MRI of the lumbar spine for my review  Since the last visit his pain is somewhat better in the neck  Previously he was stating that his pain was 10/10  Today is pain is 5/10   Pain is in hip today as well as shoulder pain on the left   Continues to ambulate with a rolling walker   N/t down bilateral lower extremities   No weakness           Patient was previously seen by MD several months.     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     Review of Systems   Constitution: Negative for fever.   HENT: Negative for congestion.    Respiratory: Negative for cough and wheezing.    Skin: Negative for poor wound healing.   Musculoskeletal: Positive for back pain, falls, muscle weakness and myalgias. Negative for joint pain and neck pain.   Gastrointestinal: Negative for abdominal pain, bowel incontinence, diarrhea, nausea and vomiting.   Genitourinary: Negative for bladder incontinence.   Neurological: Positive for numbness. Negative for seizures.   Psychiatric/Behavioral: Negative for altered mental status.         Objective:            Ortho/SPM Exam    Neurosurgery Physical Exam  Ortho/SPM Exam     Nursing note and vitals reviewed  Gen:Oriented to person, place, and time.             Appears stated age   Skin: no rashes or lesions identified   Head:Normocephalic and atraumatic.  Nose: Nose normal.    Eyes: EOM are normal. Pupils are equal, round, and reactive to light.   Neck: Neck supple. No masses or lesions palpated  Cardiovascular: Intact distal pulses.    Abdominal: Soft.   Neurological: Alert and oriented to person, place, and time.  No cranial nerve deficit.  Coordination normal. Normal muscle tone  Psychiatric: Normal mood and affect. Behavior is normal.        Back:    Spasms posteriorly  Paraspinal muscle spasms     Present Pain with flexion and extention     Decreased Range of motion      Polo, L >R Straight leg raise      Motor   Right Right Left Left  Level Group   5  5  L2 Hip flexor (Psoas)   5  5  L3 Leg extension (Quads)   5  5  L4 Dorsiflexion & foot inversion (Tibialis Anterior)   5  5  L5 Great toe extension ( EHL)   5  5  S1 Foot eversion (Gastroc, PL & PB)      Sensation  NL Decreased (R/L/BL) Level Sensation    X   L2 Anterio-medial thigh   X   L3 Medial thigh around knee   X   L4 Medial foot   X   L5 Dorsum foot   X   S1 Lateral foot      Reflex  2+   Patellar tendon (L4)   2+   Achilles tendon (S1)        MR  Multilevel lumbar spondylosis and degenerative disc disease as detailed.  Resolution of prior small extruded disc component at L4-5.  Otherwise no significant interval change.      L5-S1: Broad-based posterior disc bulge with large central disc protrusion and extruded disc component extending inferior to the disc plane along the posterior margin of the S1 vertebra similar to before.  Resultant mass effect on the thecal sac and moderate central canal stenosis with minimum canal diameter of 8 mm.  Encroachment of the descending S1 nerve root on the left.  Marked bilateral foraminal narrowing.    CT scan   At the L5-S1 level, there appears to be a diffuse disc bulge with a superimposed central to left paracentral disc extrusion that appears to at least abut and likely effaces the proximal S1 nerve root on the left.  This was also demonstrated on a previous MRI from 03/06/2018.    Assessment:       Encounter Diagnosis   Name Primary?    Degenerative disc disease, lumbar Yes          Plan:       Crow was seen today for lumbar spine pain (l-spine).    Diagnoses and all orders for this visit:    Degenerative disc disease, lumbar     Patient has a degenerative disc at L5-S1  Given his pain all over from not sure that aggressively pursuing this he would be a good surgical candidate  He  has been seen multiple times and the pain which he has consistently changes  We have seen him for  the neck shoulder hip and now back pain and leg pain  In order to treat his lumbar spine he would need an anterior lumbar interbody fusion at L5-S1  He is hesitant to pursue this at this time  We will refer him to pain management for possible injection on the left-sided L5-S1 to see this helps him with his symptoms    Thank you for the referral   Please call with any questions    Eamon Chandra MD  Neurosurgery     Disclaimer: This note was prepared using a voice recognition system and is likely to have sound alike errors within the text.

## 2020-06-02 NOTE — TELEPHONE ENCOUNTER
----- Message from Norma Helton LPN sent at 6/2/2020 11:50 AM CDT -----  Hello,    Can you pls schedule pt for schedule pt for PITO Transforaminal Left Lumbar 5-S1    Thanks  Pascale

## 2020-06-02 NOTE — TELEPHONE ENCOUNTER
Pt denies injury. Explained to Mr. Green that we would not order X-Rays prior to him being seen by Dr. Calderon. If Dr. Calderon feels that pt needs them, he will order at appt. Pt expressed verbal understanding, and was thankful for the returned call, AL, LPN.       ----- Message from Silvio Rendon sent at 6/2/2020 12:38 PM CDT -----  ..Type:  Patient Returning Call    Who Called: pt   Who Left Message for Patient  Does the patient know what this is regarding?: appt   Would the patient rather a call back or a response via [facility]? Call back   Best Call Back Number: [phone]  Additional Information:  Pt is requesting a call from nurse to have an order for ex rays put in

## 2020-06-02 NOTE — TELEPHONE ENCOUNTER
----- Message from Eduard Zhao MD sent at 6/2/2020  1:42 PM CDT -----  Ok to hold ASA 7 days and ELiquis 3 days before the procedure. And resume one day after the procedure  ----- Message -----  From: Kimmie Granados LPN  Sent: 6/2/2020   9:59 AM CDT  To: Eduard Zhao MD    Good Morning. Dr. Tsai request to perform transforaminal PITO for patient. Pt is currently on Eliquis and will need to d/c 3 days prior to having this injection. Pt will also have to stop blood thinners including aspirin 7 days prior. Please advise or contact me at ext [phone] with questions. Thanks//lp

## 2020-06-02 NOTE — PROGRESS NOTES
Subjective:      Patient ID: Crow Green is a 64 y.o. male.    Chief Complaint: Lumbar Spine Pain (L-Spine)    Patient is here today for follow-up evaluation with an MRI of the lumbar spine for my review  Since the last visit his pain is somewhat better in the neck  Previously he was stating that his pain was 10/10  Today is pain is 5/10   Pain is in hip today as well as shoulder pain on the left   Continues to ambulate with a rolling walker   N/t down bilateral lower extremities   No weakness           Patient was previously seen by MD several months.     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     Review of Systems   Constitution: Negative for fever.   HENT: Negative for congestion.    Respiratory: Negative for cough and wheezing.    Skin: Negative for poor wound healing.   Musculoskeletal: Positive for back pain, falls, muscle weakness and myalgias. Negative for joint pain and neck pain.   Gastrointestinal: Negative for abdominal pain, bowel incontinence, diarrhea, nausea and vomiting.   Genitourinary: Negative for bladder incontinence.   Neurological: Positive for numbness. Negative for seizures.   Psychiatric/Behavioral: Negative for altered mental status.         Objective:            Ortho/SPM Exam    Neurosurgery Physical Exam  Ortho/SPM Exam     Nursing note and vitals reviewed  Gen:Oriented to person, place, and time.             Appears stated age   Skin: no rashes or lesions identified   Head:Normocephalic and atraumatic.  Nose: Nose normal.    Eyes: EOM are normal. Pupils are equal, round, and reactive to light.   Neck: Neck supple. No masses or lesions palpated  Cardiovascular: Intact distal pulses.    Abdominal: Soft.   Neurological: Alert and oriented to person, place, and time.  No cranial nerve deficit.  Coordination normal. Normal muscle tone  Psychiatric: Normal mood and affect. Behavior is normal.        Back:    Spasms posteriorly  Paraspinal muscle spasms     Present Pain with flexion and extention     Decreased Range of motion      Polo, L >R Straight leg raise      Motor   Right Right Left Left  Level Group   5  5  L2 Hip flexor (Psoas)   5  5  L3 Leg extension (Quads)   5  5  L4 Dorsiflexion & foot inversion (Tibialis Anterior)   5  5  L5 Great toe extension ( EHL)   5  5  S1 Foot eversion (Gastroc, PL & PB)      Sensation  NL Decreased (R/L/BL) Level Sensation    X   L2 Anterio-medial thigh   X   L3 Medial thigh around knee   X   L4 Medial foot   X   L5 Dorsum foot   X   S1 Lateral foot      Reflex  2+   Patellar tendon (L4)   2+   Achilles tendon (S1)        MR  Multilevel lumbar spondylosis and degenerative disc disease as detailed.  Resolution of prior small extruded disc component at L4-5.  Otherwise no significant interval change.      L5-S1: Broad-based posterior disc bulge with large central disc protrusion and extruded disc component extending inferior to the disc plane along the posterior margin of the S1 vertebra similar to before.  Resultant mass effect on the thecal sac and moderate central canal stenosis with minimum canal diameter of 8 mm.  Encroachment of the descending S1 nerve root on the left.  Marked bilateral foraminal narrowing.    CT scan   At the L5-S1 level, there appears to be a diffuse disc bulge with a superimposed central to left paracentral disc extrusion that appears to at least abut and likely effaces the proximal S1 nerve root on the left.  This was also demonstrated on a previous MRI from 03/06/2018.    Assessment:       Encounter Diagnosis   Name Primary?    Degenerative disc disease, lumbar Yes          Plan:       Crow was seen today for lumbar spine pain (l-spine).    Diagnoses and all orders for this visit:    Degenerative disc disease, lumbar     Patient has a degenerative disc at L5-S1  Given his pain all over from not sure that aggressively pursuing this he would be a good surgical candidate  He  has been seen multiple times and the pain which he has consistently changes  We have seen him for  the neck shoulder hip and now back pain and leg pain  In order to treat his lumbar spine he would need an anterior lumbar interbody fusion at L5-S1  He is hesitant to pursue this at this time  We will refer him to pain management for possible injection on the left-sided L5-S1 to see this helps him with his symptoms    Thank you for the referral   Please call with any questions    Eamon Chandra MD  Neurosurgery     Disclaimer: This note was prepared using a voice recognition system and is likely to have sound alike errors within the text.

## 2020-06-05 ENCOUNTER — OFFICE VISIT (OUTPATIENT)
Dept: ORTHOPEDICS | Facility: CLINIC | Age: 65
End: 2020-06-05
Payer: MEDICARE

## 2020-06-05 ENCOUNTER — HOSPITAL ENCOUNTER (OUTPATIENT)
Dept: RADIOLOGY | Facility: HOSPITAL | Age: 65
Discharge: HOME | End: 2020-06-05
Attending: PHYSICAL MEDICINE & REHABILITATION
Payer: MEDICARE

## 2020-06-05 VITALS
BODY MASS INDEX: 32.99 KG/M2 | WEIGHT: 198 LBS | HEART RATE: 96 BPM | DIASTOLIC BLOOD PRESSURE: 73 MMHG | SYSTOLIC BLOOD PRESSURE: 126 MMHG | HEIGHT: 65 IN

## 2020-06-05 DIAGNOSIS — M54.16 LUMBAR RADICULOPATHY: ICD-10-CM

## 2020-06-05 DIAGNOSIS — G89.29 OTHER CHRONIC PAIN: ICD-10-CM

## 2020-06-05 DIAGNOSIS — M79.605 LEFT LEG PAIN: Primary | ICD-10-CM

## 2020-06-05 DIAGNOSIS — M79.605 LEFT LEG PAIN: ICD-10-CM

## 2020-06-05 PROCEDURE — 99999 PR PBB SHADOW E&M-EST. PATIENT-LVL III: ICD-10-PCS | Mod: PBBFAC,HCNC,, | Performed by: PHYSICAL MEDICINE & REHABILITATION

## 2020-06-05 PROCEDURE — 3074F SYST BP LT 130 MM HG: CPT | Mod: HCNC,CPTII,S$GLB, | Performed by: PHYSICAL MEDICINE & REHABILITATION

## 2020-06-05 PROCEDURE — 3078F DIAST BP <80 MM HG: CPT | Mod: HCNC,CPTII,S$GLB, | Performed by: PHYSICAL MEDICINE & REHABILITATION

## 2020-06-05 PROCEDURE — 3074F PR MOST RECENT SYSTOLIC BLOOD PRESSURE < 130 MM HG: ICD-10-PCS | Mod: HCNC,CPTII,S$GLB, | Performed by: PHYSICAL MEDICINE & REHABILITATION

## 2020-06-05 PROCEDURE — 99214 PR OFFICE/OUTPT VISIT, EST, LEVL IV, 30-39 MIN: ICD-10-PCS | Mod: HCNC,S$GLB,, | Performed by: PHYSICAL MEDICINE & REHABILITATION

## 2020-06-05 PROCEDURE — 3008F PR BODY MASS INDEX (BMI) DOCUMENTED: ICD-10-PCS | Mod: HCNC,CPTII,S$GLB, | Performed by: PHYSICAL MEDICINE & REHABILITATION

## 2020-06-05 PROCEDURE — 3008F BODY MASS INDEX DOCD: CPT | Mod: HCNC,CPTII,S$GLB, | Performed by: PHYSICAL MEDICINE & REHABILITATION

## 2020-06-05 PROCEDURE — 73590 X-RAY EXAM OF LOWER LEG: CPT | Mod: 26,HCNC,LT, | Performed by: RADIOLOGY

## 2020-06-05 PROCEDURE — 3078F PR MOST RECENT DIASTOLIC BLOOD PRESSURE < 80 MM HG: ICD-10-PCS | Mod: HCNC,CPTII,S$GLB, | Performed by: PHYSICAL MEDICINE & REHABILITATION

## 2020-06-05 PROCEDURE — 73590 XR TIBIA FIBULA 2 VIEW LEFT: ICD-10-PCS | Mod: 26,HCNC,LT, | Performed by: RADIOLOGY

## 2020-06-05 PROCEDURE — 73590 X-RAY EXAM OF LOWER LEG: CPT | Mod: TC,HCNC,LT

## 2020-06-05 PROCEDURE — 99999 PR PBB SHADOW E&M-EST. PATIENT-LVL III: CPT | Mod: PBBFAC,HCNC,, | Performed by: PHYSICAL MEDICINE & REHABILITATION

## 2020-06-05 PROCEDURE — 99214 OFFICE O/P EST MOD 30 MIN: CPT | Mod: HCNC,S$GLB,, | Performed by: PHYSICAL MEDICINE & REHABILITATION

## 2020-06-05 NOTE — PATIENT INSTRUCTIONS
Understanding Lumbar Radiculopathy    Lumbar radiculopathy is irritation or inflammation of a nerve root in the low back. It causes symptoms that spread out from the back down one or both legs. To understand this condition, it helps to understand the parts of the spine:  · Vertebrae. These are bones that stack to form the spine. The lumbar spine contains the 5 bottom vertebrae.  · Disks. These are soft pads of tissue between the vertebrae. They act as shock absorbers for the spine.  · Spinal canal. This is a tunnel formed within the stacked vertebrae. In the lumbar spine, nerves run through this canal.  · Nerves. These branch off and leave the spinal canal, traveling out to parts of the body. As they leave the spinal canal, nerves pass through openings between the vertebrae. The nerve root is the part of the nerve that is closest to the spinal canal.  · Sciatic nerve. This is a large nerve formed from several nerve roots in the low back. This nerve extends down the back of the leg to the foot.  With lumbar radiculopathy, nerve roots in the low back become irritated. This leads to pain and symptoms. The sciatic nerve is commonly involved, so the condition is often called sciatica.  What causes lumbar radiculopathy?  Aging, injury, poor posture, extra body weight, and other issues can lead to problems in the low back. These problems may then irritate nerve roots. They include:  · Damage to a disk in the lumbar spine. The damaged disk may then press on nearby nerve roots.  · Degeneration from wear and tear, and aging. This can lead to narrowing (stenosis) of the openings between the vertebrae. The narrowed openings press on nerve roots as they leave the spinal canal.  · Unstable spine. This is when a vertebra slips forward. It can then press on a nerve root.  Other, less common things can put pressure on nerves in the low back. These include diabetes, infection, or a tumor.  Symptoms of lumbar radiculopathy  These  include:  · Pain in the low back  · Pain, numbness, tingling, or weakness that travels into the buttocks, hip, groin, or leg  · Muscle spasms  Treatment for lumbar radiculopathy  In most cases, your healthcare provider will first try treatments that help relieve symptoms. These may include:  · Prescription and over-the-counter pain medicines. These help relieve pain, swelling, and irritation.  · Limits on positions and activities that increase pain. But lying in bed or avoiding all movement is only recommended for a short period of time.  · Physical therapy, including exercises and stretches. This helps decrease pain and increase movement and function.  · Steroid shots into the lower back. This may help relieve symptoms for a time.  · Weight-loss program. If you are overweight, losing extra pounds may help relieve symptoms.  In some cases, you may need surgery to fix the underlying problem. This depends on the cause, the symptoms, and how long the pain has lasted.  Possible complications  Over time, an irritated and inflamed nerve may become damaged. This may lead to long-lasting (permanent) numbness or weakness in your legs and feet. If symptoms change suddenly or get worse, be sure to let your healthcare provider know.  When to call your healthcare provider  Call your healthcare provider right away if you have any of these:  · New pain or pain that gets worse  · New or increasing weakness, tingling, or numbness in your leg or foot  · Problems controlling your bladder or bowel   Date Last Reviewed: 3/10/2016  © 7062-9605 "[employer]". [address] [place] [address], [place] [address]. All rights reserved. This information is not intended as a substitute for professional medical care. Always follow your healthcare professional's instructions.

## 2020-06-05 NOTE — PROGRESS NOTES
SPORTS MEDICINE / PM&R NEW PATIENT H & P:    Referring Physician: Self, Aaareferral    Chief Complaint   Patient presents with    Left Lower Leg - Pain       HPI: This is a 64 y.o.  male being seen in clinic today for evaluation of Pain of the Left Lower Leg   The problem first began on Saturday, May 30th. He states that he thought he leg was asleep, tried to get out of bed and almost fell . He feels aching, burning (mostly), tingling, constant, pain at rest and pain with movement pain in his left and lower lower legs. The symptoms are worsening. He has tried tylenol without improvement. He has not tried physical therapy. He has a well known history of chronic L5-S1 radiculopathy that he sees Dr. Chandra and Dr. Tsai for treatment.    History obtained from patient.    Past family, medical, social, surgical history, and vital signs reviewed in chart.    Review of Systems   Constitutional: Negative for chills, fever and weight loss.   HENT: Negative for hearing loss and sore throat.    Eyes: Negative for blurred vision, photophobia and pain.   Respiratory: Negative for shortness of breath.    Cardiovascular: Negative for chest pain.   Gastrointestinal: Negative for abdominal pain.   Genitourinary: Negative for dysuria.   Musculoskeletal: Positive for joint pain.   Skin: Negative for rash.   Neurological: Negative for tingling and headaches.   Endo/Heme/Allergies: Does not bruise/bleed easily.   Psychiatric/Behavioral: Negative for depression.       General    Nursing note and vitals reviewed.  Constitutional: He is oriented to person, place, and time. He appears well-developed and well-nourished.   HENT:   Head: Normocephalic and atraumatic.   Eyes: Conjunctivae and EOM are normal. Pupils are equal, round, and reactive to light.   Neck: Neck supple.   Cardiovascular: Intact distal pulses.    Pulmonary/Chest: Effort normal. No respiratory distress.   Abdominal: He exhibits no distension.   Neurological: He is alert  and oriented to person, place, and time. He has normal reflexes.   Psychiatric: He has a normal mood and affect.         Right Ankle/Foot Exam   Right ankle exam is normal.    Left Ankle/Foot Exam     Inspection  Erythema: absent  Bruising: Ankle - absent Foot - absent  Effusion: Ankle - absent     Comments:  Extremely tender to even very light touch on skin in medial to anterior lower leg, largely in an L4 distribution. Some pain and possible weakness with resisted ankle DF. Ambulating well with rollator walker and pain does not appear to increase with weight bearing. + SLR        IMPRESSION/PLAN: Crow is a 64 y.o.  male with:    1. Left leg pain  - X-Ray Tibia Fibula 2 View Left; Future    2. Lumbar radiculopathy    3. Other chronic pain       The findings were discussed with Crow in detail. This pain seems most consistent with his known lumbar radiculopathy that is worse on the left side. He has already seen Dr. Chandra for this earlier this week and was referred to Dr. Tsai. He is scheduled for Lumbar PITO on 6/11/20. He does not seem to understand that this pain is likely coming from his back / pinched nerve. We will obtain x-ray to rule out orthopedic lesion. Otherwise he should follow-up with Pain Clinic for further eval and treatment. All of his questions were answered. He was provided this plan in writing. He will follow-up with arsenio Calderon M.D.  Sports Medicine    Addendum:   EXAMINATION:  XR TIBIA FIBULA 2 VIEW LEFT    CLINICAL HISTORY:  Pain in left leg    TECHNIQUE:  AP and lateral views of the left tibia and fibula were performed.    COMPARISON:  None.    FINDINGS:  No acute osseous abnormality.  Mild degenerative findings of the knee noted.  Arterial vascular calcifications present without focal soft tissue abnormality.      Impression       As above      Electronically signed by: Shamar Kovacs MD  Date: 06/05/2020  Time: 08:21     Discussed results with patient. No orthopedic  cause for leg pain. Keep follow-up with Pain Clinic.

## 2020-06-08 NOTE — PRE-PROCEDURE INSTRUCTIONS
"Attempted to PAT pt. No answer on pt's numbers. Spoke with emergency contact. Stated "just spoke with him on his mobile number." attempted to call again. NO answer. LVM.  "

## 2020-06-08 NOTE — PRE-PROCEDURE INSTRUCTIONS
Spoke with Patient regarding procedure scheduled on   Arrival time 0800  Has patient been sick with fever or on antibiotics within the last 7 days? no  Has patient received a vaccination within the last 7 days? no  Has the patient stopped all medications as directed? Yes held eliquis 6/8 and aspirin 6/2. Hold am dose of Amaryl. Patient states he is no longer taking Metformin.  Does patient have a pacemaker and or defibrillator? no  Does the patient have a ride to and from procedure and someone reliable to remain with patient? Yes Taylor pt will give contact number on arrival  Is the patient diabetic? Yes   Does the patient have sleep apnea? Or use O2 at home? no  Is the patient receiving sedation? yes  Is the patient instructed to remain NPO beginning at midnight the night before their procedure? yes  Procedure location confirmed with patient? yes  Covid testing: pt to receive rapid morning of procedure due to no transportation.

## 2020-06-11 ENCOUNTER — PATIENT OUTREACH (OUTPATIENT)
Dept: ADMINISTRATIVE | Facility: OTHER | Age: 65
End: 2020-06-11

## 2020-06-11 ENCOUNTER — HOSPITAL ENCOUNTER (OUTPATIENT)
Facility: HOSPITAL | Age: 65
Discharge: HOME | End: 2020-06-11
Attending: ANESTHESIOLOGY | Admitting: ANESTHESIOLOGY
Payer: MEDICARE

## 2020-06-11 VITALS
BODY MASS INDEX: 32.2 KG/M2 | DIASTOLIC BLOOD PRESSURE: 67 MMHG | OXYGEN SATURATION: 99 % | RESPIRATION RATE: 15 BRPM | WEIGHT: 193.25 LBS | HEIGHT: 65 IN | HEART RATE: 70 BPM | TEMPERATURE: 98 F | SYSTOLIC BLOOD PRESSURE: 111 MMHG

## 2020-06-11 DIAGNOSIS — M54.16 LUMBAR RADICULOPATHY: Primary | ICD-10-CM

## 2020-06-11 LAB
POCT GLUCOSE: 139 MG/DL (ref 70–110)
POCT GLUCOSE: 55 MG/DL (ref 70–110)
POCT GLUCOSE: 58 MG/DL (ref 70–110)
POCT GLUCOSE: 65 MG/DL (ref 70–110)
SARS-COV-2 RDRP RESP QL NAA+PROBE: NEGATIVE

## 2020-06-11 PROCEDURE — 63600175 PHARM REV CODE 636 W HCPCS: Mod: HCNC | Performed by: ANESTHESIOLOGY

## 2020-06-11 PROCEDURE — 64483 NJX AA&/STRD TFRM EPI L/S 1: CPT | Mod: HCNC,LT,, | Performed by: ANESTHESIOLOGY

## 2020-06-11 PROCEDURE — 64484 PRA INJECT ANES/STEROID FORAMEN LUMBAR/SACRAL W IMG GUIDE ,EA ADD LEVEL: ICD-10-PCS | Mod: HCNC,LT,, | Performed by: ANESTHESIOLOGY

## 2020-06-11 PROCEDURE — 64483 NJX AA&/STRD TFRM EPI L/S 1: CPT | Mod: HCNC | Performed by: ANESTHESIOLOGY

## 2020-06-11 PROCEDURE — 64483 PR EPIDURAL INJ, ANES/STEROID, TRANSFORAMINAL, LUMB/SACR, SNGL LEVL: ICD-10-PCS | Mod: HCNC,LT,, | Performed by: ANESTHESIOLOGY

## 2020-06-11 PROCEDURE — 99153 MOD SED SAME PHYS/QHP EA: CPT | Mod: HCNC | Performed by: ANESTHESIOLOGY

## 2020-06-11 PROCEDURE — 25000003 PHARM REV CODE 250: Mod: HCNC | Performed by: ANESTHESIOLOGY

## 2020-06-11 PROCEDURE — U0002 COVID-19 LAB TEST NON-CDC: HCPCS | Mod: HCNC

## 2020-06-11 PROCEDURE — 25500020 PHARM REV CODE 255: Mod: HCNC | Performed by: ANESTHESIOLOGY

## 2020-06-11 PROCEDURE — 64484 NJX AA&/STRD TFRM EPI L/S EA: CPT | Mod: HCNC | Performed by: ANESTHESIOLOGY

## 2020-06-11 PROCEDURE — 99152 MOD SED SAME PHYS/QHP 5/>YRS: CPT | Mod: HCNC | Performed by: ANESTHESIOLOGY

## 2020-06-11 PROCEDURE — 64484 NJX AA&/STRD TFRM EPI L/S EA: CPT | Mod: HCNC,LT,, | Performed by: ANESTHESIOLOGY

## 2020-06-11 RX ORDER — DEXTROSE 50 % IN WATER (D50W) INTRAVENOUS SYRINGE
12.5 ONCE
Status: COMPLETED | OUTPATIENT
Start: 2020-06-11 | End: 2020-06-11

## 2020-06-11 RX ORDER — LIDOCAINE HYDROCHLORIDE 10 MG/ML
INJECTION, SOLUTION EPIDURAL; INFILTRATION; INTRACAUDAL; PERINEURAL
Status: DISCONTINUED | OUTPATIENT
Start: 2020-06-11 | End: 2020-06-11 | Stop reason: HOSPADM

## 2020-06-11 RX ORDER — FENTANYL CITRATE 50 UG/ML
INJECTION, SOLUTION INTRAMUSCULAR; INTRAVENOUS
Status: DISCONTINUED | OUTPATIENT
Start: 2020-06-11 | End: 2020-06-11 | Stop reason: HOSPADM

## 2020-06-11 RX ORDER — DEXAMETHASONE SODIUM PHOSPHATE 100 MG/10ML
INJECTION INTRAMUSCULAR; INTRAVENOUS
Status: DISCONTINUED | OUTPATIENT
Start: 2020-06-11 | End: 2020-06-11 | Stop reason: HOSPADM

## 2020-06-11 RX ORDER — MIDAZOLAM HYDROCHLORIDE 1 MG/ML
INJECTION, SOLUTION INTRAMUSCULAR; INTRAVENOUS
Status: DISCONTINUED | OUTPATIENT
Start: 2020-06-11 | End: 2020-06-11 | Stop reason: HOSPADM

## 2020-06-11 RX ADMIN — DEXTROSE MONOHYDRATE 12.5 G: 500 INJECTION PARENTERAL at 10:06

## 2020-06-11 NOTE — PLAN OF CARE
Discharge instructions reviewed with pt. Pt states understanding and has no further questions. BG <55, Pt given 12.5 g of dextrose. BG then 139. Pt AAOx4, stable.

## 2020-06-11 NOTE — OP NOTE
"Procedure: Lumbar Transforaminal Epidural Steroid Injection under Fluoroscopic Guidance (supraneural approach)    Level: L4/5 L5/S1    Side: Left    PROCEDURE DATE: 6/11/2020    Pre-operative Diagnosis: Lumbar Radiculopathy  Post-operative Diagnosis: Lumbar Radiculopathy    Provider: Dillan Tsai MD  Assistant(s): None    Anesthesia: Local, IV Sedation    >> 2 mg of VERSED    >> 100 mcg of FENTANYL     Indication: Low back pain with radiculopathy consistent with distribution of targeted nerve. Symptoms unresponsive to conservative treatments. Fluoroscopy was used to optimize visualization of needle placement and to maximize safety.     Procedure Description / Technique:  The patient was seen and identified in the preoperative area. Risks, benefits, complications, and alternatives were discussed with the patient. The patient agreed to proceed with the procedure and signed the consent. The site and side of the procedure was identified and marked. An IV was started. The patient was taken to the procedural suite.    The patient was positioned in prone orientation on procedure table and a pillow was placed under the abdomen to reduce lumbar lordosis. A time out was performed prior to any intervention. The procedure, site, side, and allergies were stated and agreed to by all present. The lumbosacral area was widely prepped with ChloraPrep. The procedural site was draped in usual sterile fashion. Vital signs were closely monitored throughout this procedure. Conscious sedation was used for this procedure to decrease patient anxiety.    The target area was visualized under fluoroscopy. The cephalocaudal angle of the fluoroscope was adjusted as to align the vertebral end plates. The fluoroscopic arm was rotated ipsilaterally to an angle of approximately 30 degrees until the "mayra dog" outline came into view and the tip of the inferior superior articular process pointed towards the midline, 6:00 position of the above " "pedicle. A 25 gauge 3.5 inch spinal needle was directed towards the "chin" of the "mayra dog" (adjacent to the pars interarticularis and inferior to the pedicle). The needle was advanced until OS was met at the inferior border of the pedicle / pars interface. The needle was adjusted so that it would pass inferior to the osseous border. The fluoroscope was then placed in the lateral position and the needle was slowly advanced until it rested in the posterior 1/3rd of the vertebral foramen. AP fluoroscopy was checked and the needle tip rested at the 6:00 position under the pedicle. No paresthesia was elicited during needle placement. With the needle tip in its final position, gentle aspiration was negative for blood and CSF. Omnipaque 240 (1 to 2 mL) was injected under live fluoroscopy. Microbore tubing was used for injection. There was no pain or paresthesia on injection. The contrast clearly delineated the targeted nerve root on AP fluoroscopy. No vascular uptake was seen. A solution containing 2 mL of 1% PF Lidocaine and 2 mL of Dexamethasone (10 mg/mL) was mixed and 2 mL was injected slowly at each level targeted. There was minimal resistance on injection. No pain or paresthesia was elicited on injection. The stylet was replaced and the needle was withdrawn intact. This procedure was performed for each of the above indicated levels.     Description of Findings: Not applicable    Prosthetic devices, grafts, tissues, or devices implanted: None    Specimen Removed: No    Estimated Blood Loss: minimal    COMPLICATIONS: None    DISPOSITION / PLANS: The patient was transferred to the recovery area in a stable condition for observation. The patient was reexamined prior to discharge. There was no evidence of acute neurologic injury following the procedure.  Patient was discharged from the recovery room after meeting discharge criteria. Home discharge instructions were given to the patient by the staff.  "

## 2020-06-11 NOTE — DISCHARGE INSTRUCTIONS
Pain Post Injection     1. Side effects may include: headache, restlessness at night, weakness to upper or lower extremities (arms or legs), flushing of the face and/ or neck. None are life threatening and will subside within 2-3 days.   2. If you have SEVERE headache with nausea and extreme pain, please call office ([phone]). Unless you usually have this type of migraine headache.   3. If you develop fever (greater than 101 F) or have any redness, swelling, or drainage at the injection site(s), please call off ([phone]). This may be a sign of infection.   4. If a rash or whelps (like hives) occur, please call the office ([phone]).  5. If you are a heart patient, please watch for symptoms such as swelling in your hands and feet and shortness of breath. If any of these symptoms occur, please notify your primary care/ heart doctor and go to the nearest emergency room.  6. If you have high blood pressure, you may experience an increase in your blood pressure over the next two weeks. Please continue to monitor your blood pressure and contact your primary care provider if your blood pressure does not return to baseline.    7. If you are a diabetic or you monitor your blood sugar, you may have an increase in your blood sugar over the next two weeks. If your blood sugar does not return to your baseline, please contact your primary care provider.  8. NO HEAT TO THE INJECTION SITE for the next 24 hours including: bath or shower, heating pad, moist heat, and / or hot tubs.   9. May use ice pack to injection site for any pain or discomfort. Apply ice pack for 20 minutes then remove for 20 minutes before re- applying to site. (recipe for homemade frozen gel pack below)  10. DO NOT DRIVE OR OPERATE HEAVY MACHINERY UNTIL TOMORROW MORNING.   11. OK to resume all medications unless otherwise directed by your  doctor.  12. Injection(s) may take up to two weeks until full effects are noted.  13. You may return to normal activity/ work the following day.  14. Please do not receive a flu or pneumonia vaccine until one week after your procedure.  .  Homemade Frozen Gel Ice Pack:  1 bottle of rubbing alcohol  3 bottles of water (use rubbing alcohol bottle to measure)  2 large zip lock bags    Instructions:  1. Pour alcohol and water into zip lock bag. Make sure bag is large enough to allow for expansion.  2. Try to get as much air out of the freezer bag before sealing it shut.   3. Place the bag and its contents inside a second freezer bag to contain any leakage.   4. Leave the bag in the freezer for at least one hour.  5. When it's ready, place a towel between the gel pack and bare skin to avoid burning the skin.

## 2020-06-11 NOTE — DISCHARGE SUMMARY
[facility]  Discharge Note       Description of Procedure: Left L4-5, L5-S1 Lumbar Transforaminal Epidural Steroid Injection under Fluoroscopic Guidance    Procedure Date: 6/11/2020    Admit Date: 6/11/2020  Discharge Date: 6/11/2020     Attending Physician: Quin Grimaldo   Discharge Provider: Quin Grimaldo    Preoperative Diagnosis: Lumbar Spondylosis, Lumbar Radiculopathy     Postoperative Diagnosis: as above, same as preoperative diagnosis    Discharged Condition: Stable    Hospital Course: Patient was admitted for an outpatient procedure. The procedure was tolerated well with no complications.    Final Diagnoses: Same as principal problem.    Disposition: Home, self-care.    Follow up/Patient Instructions:  Follow-up in clinic in 2-3 weeks.    Medications: No medications were prescribed today. The patient was advised to resume normal medication regimen without change.  Specific information was provided regarding restarting any anticoagulant/s.    Discharge Procedure Orders (must include Diet, Follow-up, Activity):  Light activity for the remainder of the day, resume normal activity tomorrow. Resume normal diet. Follow-up in clinic in 2-3 weeks.

## 2020-06-12 ENCOUNTER — OFFICE VISIT (OUTPATIENT)
Dept: PODIATRY | Facility: CLINIC | Age: 65
End: 2020-06-12
Payer: MEDICARE

## 2020-06-12 VITALS
BODY MASS INDEX: 33.06 KG/M2 | HEIGHT: 65 IN | DIASTOLIC BLOOD PRESSURE: 80 MMHG | HEART RATE: 95 BPM | SYSTOLIC BLOOD PRESSURE: 146 MMHG | WEIGHT: 198.44 LBS

## 2020-06-12 DIAGNOSIS — B35.1 DERMATOPHYTOSIS OF NAIL: ICD-10-CM

## 2020-06-12 DIAGNOSIS — S91.209A AVULSED TOENAIL, INITIAL ENCOUNTER: Primary | ICD-10-CM

## 2020-06-12 DIAGNOSIS — E11.49 TYPE II DIABETES MELLITUS WITH NEUROLOGICAL MANIFESTATIONS: ICD-10-CM

## 2020-06-12 PROCEDURE — 99213 PR OFFICE/OUTPT VISIT, EST, LEVL III, 20-29 MIN: ICD-10-PCS | Mod: 25,HCNC,S$GLB, | Performed by: PODIATRIST

## 2020-06-12 PROCEDURE — 3051F PR MOST RECENT HEMOGLOBIN A1C LEVEL 7.0 - < 8.0%: ICD-10-PCS | Mod: HCNC,CPTII,S$GLB, | Performed by: PODIATRIST

## 2020-06-12 PROCEDURE — 3008F PR BODY MASS INDEX (BMI) DOCUMENTED: ICD-10-PCS | Mod: HCNC,CPTII,S$GLB, | Performed by: PODIATRIST

## 2020-06-12 PROCEDURE — 99999 PR PBB SHADOW E&M-EST. PATIENT-LVL III: CPT | Mod: PBBFAC,HCNC,, | Performed by: PODIATRIST

## 2020-06-12 PROCEDURE — 99999 PR PBB SHADOW E&M-EST. PATIENT-LVL III: ICD-10-PCS | Mod: PBBFAC,HCNC,, | Performed by: PODIATRIST

## 2020-06-12 PROCEDURE — 3079F DIAST BP 80-89 MM HG: CPT | Mod: HCNC,CPTII,S$GLB, | Performed by: PODIATRIST

## 2020-06-12 PROCEDURE — 3079F PR MOST RECENT DIASTOLIC BLOOD PRESSURE 80-89 MM HG: ICD-10-PCS | Mod: HCNC,CPTII,S$GLB, | Performed by: PODIATRIST

## 2020-06-12 PROCEDURE — 99213 OFFICE O/P EST LOW 20 MIN: CPT | Mod: 25,HCNC,S$GLB, | Performed by: PODIATRIST

## 2020-06-12 PROCEDURE — 3051F HG A1C>EQUAL 7.0%<8.0%: CPT | Mod: HCNC,CPTII,S$GLB, | Performed by: PODIATRIST

## 2020-06-12 PROCEDURE — 3077F PR MOST RECENT SYSTOLIC BLOOD PRESSURE >= 140 MM HG: ICD-10-PCS | Mod: HCNC,CPTII,S$GLB, | Performed by: PODIATRIST

## 2020-06-12 PROCEDURE — 11721 PR DEBRIDEMENT OF NAILS, 6 OR MORE: ICD-10-PCS | Mod: Q9,HCNC,S$GLB, | Performed by: PODIATRIST

## 2020-06-12 PROCEDURE — 11721 DEBRIDE NAIL 6 OR MORE: CPT | Mod: Q9,HCNC,S$GLB, | Performed by: PODIATRIST

## 2020-06-12 PROCEDURE — 3077F SYST BP >= 140 MM HG: CPT | Mod: HCNC,CPTII,S$GLB, | Performed by: PODIATRIST

## 2020-06-12 PROCEDURE — 3008F BODY MASS INDEX DOCD: CPT | Mod: HCNC,CPTII,S$GLB, | Performed by: PODIATRIST

## 2020-06-12 NOTE — PROGRESS NOTES
Subjective:     Patient ID: Crow Green is a 64 y.o. male.    Chief Complaint: Nail Care (skin peeling to left hallux. no c/o pain. wears tennis shoes. diabetic Pt. last seen on 02/21/20 with PCP Dr. Lambert.)    Crow is a 64 y.o. male who presents to the clinic for evaluation and treatment of high risk feet. Crow has a past medical history of Acute kidney injury, Arthritis, Asthma, Atrial fibrillation, Atrial fibrillation with RVR (8/7/2019), CHF (congestive heart failure), Depression, Diabetes mellitus, type 2 (Diagnosed in 2000), Elevated PSA, Hypertension, and Sleep apnea. The patient's chief complaint is long, thick toenails.  This patient has documented high risk feet requiring routine maintenance secondary to diabetes mellitis and those secondary complications of diabetes, as mentioned..    PCP: Mateo Lambert, DO    Date Last Seen by PCP: 10/31/19    Current shoe gear:  Affected Foot: Tennis shoes     Unaffected Foot: Tennis shoes    Hemoglobin A1C   Date Value Ref Range Status   05/19/2020 7.7 (H) 4.0 - 5.6 % Final     Comment:     ADA Screening Guidelines:  5.7-6.4%  Consistent with prediabetes  >or=6.5%  Consistent with diabetes  High levels of fetal hemoglobin interfere with the HbA1C  assay. Heterozygous hemoglobin variants (HbS, HgC, etc)do  not significantly interfere with this assay.   However, presence of multiple variants may affect accuracy.     12/13/2019 7.5 (H) 4.0 - 5.6 % Final     Comment:     ADA Screening Guidelines:  5.7-6.4%  Consistent with prediabetes  >or=6.5%  Consistent with diabetes  High levels of fetal hemoglobin interfere with the HbA1C  assay. Heterozygous hemoglobin variants (HbS, HgC, etc)do  not significantly interfere with this assay.   However, presence of multiple variants may affect accuracy.     08/08/2019 9.7 (H) 4.0 - 5.6 % Final     Comment:     ADA Screening Guidelines:  5.7-6.4%  Consistent with prediabetes  >or=6.5%  Consistent with diabetes  High  levels of fetal hemoglobin interfere with the HbA1C  assay. Heterozygous hemoglobin variants (HbS, HgC, etc)do  not significantly interfere with this assay.   However, presence of multiple variants may affect accuracy.             Patient Active Problem List   Diagnosis    ED (erectile dysfunction)    Non-proliferative diabetic retinopathy, mild, both eyes    Hypertension associated with diabetes    Diabetic polyneuropathy    Coronary artery disease of native artery of native heart with stable angina pectoris    Chronic systolic congestive heart failure    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, with long-term current use of insulin    SANDRITA on CPAP    Moderate asthma    Psychophysiological insomnia    Nightmares    Sleep related gastroesophageal reflux disease    Inadequate sleep hygiene    PAF (paroxysmal atrial fibrillation)    At risk for medication noncompliance    Obesity (BMI 30.0-34.9)    Indeterminate stage chronic open angle glaucoma    Right hip pain    Gait instability    Chronic right shoulder pain    Arthritis    Left sided sciatica    Dyspnea on exertion    Osteoarthritis of spine with radiculopathy, lumbar region    HNP (herniated nucleus pulposus), lumbar    Gastroesophageal reflux disease without esophagitis    Chronic diastolic heart failure    Hypogonadism in male    Disorder of parathyroid gland    Hyperlipidemia associated with type 2 diabetes mellitus    LRTI (lower respiratory tract infection)    Physical deconditioning    Acute pain of left shoulder    Traumatic tear of left rotator cuff    Atrial fibrillation with RVR    Other chronic pain    Left shoulder pain    Complete tear of left rotator cuff    Stage 3 chronic kidney disease    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 associated with type 2 diabetes mellitus    Lumbar radiculopathy       Medication  List with Changes/Refills   Current Medications    ALCOHOL ANTISEPTIC PADS (ALCOHOL PREP PADS TOP)        ASPIRIN (ECOTRIN) 81 MG EC TABLET    Take 1 tablet (81 mg total) by mouth once daily.    ATORVASTATIN (LIPITOR) 40 MG TABLET    TAKE 1 TABLET EVERY EVENING    AZATHIOPRINE (IMURAN) 50 MG TAB    Take 1 tablet (50 mg total) by mouth once daily.    BACLOFEN (LIORESAL) 10 MG TABLET    Take 1 tablet (10 mg total) by mouth once daily.    BLOOD SUGAR DIAGNOSTIC (TRUE METRIX GLUCOSE TEST STRIP) STRP    Use with blood glucose meter kit to test blood sugar four times  daily    BLOOD-GLUCOSE METER (TRUE METRIX AIR GLUCOSE METER) KIT    TEST FOUR TIMES DAILY BEFORE MEALS  AND EVERY NIGHT    DICLOFENAC SODIUM (VOLTAREN) 1 % GEL    Apply 2 g topically 4 (four) times daily.    DILTIAZEM (CARDIZEM SR) 60 MG CP12    Take 1 capsule (60 mg total) by mouth 2 (two) times daily.    ELIQUIS 5 MG TAB    Take 1 tablet (5 mg total) by mouth 2 (two) times daily.    FLUTICASONE-SALMETEROL DISKUS INHALER 250-50 MCG    Inhale 1 puff into the lungs 2 (two) times daily. Controller    FOOD SUPPLEMT, LACTOSE-REDUCED (ENSURE) LIQD    Take 118 mLs by mouth 3 (three) times daily with meals.    FUROSEMIDE (LASIX) 40 MG TABLET    TAKE 2 TABLETS EVERY MORNING  AND TAKE 1 TABLET EVERY EVENING    GABAPENTIN (NEURONTIN) 800 MG TABLET    Take 1 tablet (800 mg total) by mouth 3 (three) times daily.    GLIMEPIRIDE (AMARYL) 2 MG TABLET    TAKE 1 TABLET (2 MG TOTAL) BY MOUTH BEFORE BREAKFAST.    INHALATION SPACING DEVICE    Use as directed for inhalation.    INSULIN (LANTUS SOLOSTAR U-100 INSULIN) GLARGINE 100 UNITS/ML (3ML) SUBQ PEN    INJECT 80 UNITS SUBCUTANEOUSLY EVERY EVENING    INSULIN ASPART U-100 (NOVOLOG FLEXPEN U-100 INSULIN) 100 UNIT/ML (3 ML) INPN PEN    INJECT 20 UNITS SUBCUTANEOUSLY THREE TIMES DAILY WITH MEALS    INSULIN SYRINGE-NEEDLE U-100 1 ML 31 GAUGE X 5/16 SYRG        IPRATROPIUM (ATROVENT) 0.02 % NEBULIZER SOLUTION    USE 1 VIAL VIA  "NEBULIZER FOUR TIMES DAILY    ISOSORBIDE MONONITRATE (IMDUR) 60 MG 24 HR TABLET    Take 1 tablet (60 mg total) by mouth once daily.    KETOROLAC (TORADOL) 10 MG TABLET    Take 1 tablet (10 mg total) by mouth every 8 (eight) hours as needed for Pain.    LANCETS 32 GAUGE MISC    1 lancet by Misc.(Non-Drug; Combo Route) route 2 (two) times daily.    LATANOPROST 0.005 % OPHTHALMIC SOLUTION    INSTILL 1 DROP INTO BOTH EYES EVERY EVENING    LISINOPRIL 10 MG TABLET    Take 1 tablet (10 mg total) by mouth once daily.    METFORMIN (GLUCOPHAGE-XR) 500 MG XR 24HR TABLET    Take 2 tablets (1,000 mg total) by mouth 2 (two) times daily with meals.    METOPROLOL SUCCINATE (TOPROL-XL) 50 MG 24 HR TABLET    Take 1 tablet (50 mg total) by mouth once daily.    NITROGLYCERIN (NITROSTAT) 0.3 MG SL TABLET    PLACE 1 TABLET UNDER THE TONGUE EVERY 5 MINUTES AS NEEDED FOR CHEST PAIN, NO MORE THAN 3 TABLETS IN ONE DAY    OZEMPIC 1 MG/DOSE (2 MG/1.5 ML) PNIJ        PANTOPRAZOLE (PROTONIX) 40 MG TABLET    Take 1 tablet (40 mg total) by mouth once daily.    PEN NEEDLE, DIABETIC (BD ULTRA-FINE SHORT PEN NEEDLE) 31 GAUGE X 5/16" NDLE    Inject 1 each into the skin 3 (three) times daily.    PREDNISONE (DELTASONE) 5 MG TABLET    Take 1 tablet (5 mg total) by mouth 2 (two) times daily.    SERTRALINE (ZOLOFT) 100 MG TABLET    Take 1 tablet (100 mg total) by mouth every evening.    SERTRALINE (ZOLOFT) 50 MG TABLET    TAKE 1 TABLET ONE TIME DAILY    TAMSULOSIN (FLOMAX) 0.4 MG CAP    Take 1 capsule (0.4 mg total) by mouth once daily.    TIOTROPIUM (SPIRIVA WITH HANDIHALER) 18 MCG INHALATION CAPSULE    Inhale 1 capsule (18 mcg total) into the lungs once daily. Controller    TRAZODONE (DESYREL) 100 MG TABLET    Take 2 tablets (200 mg total) by mouth every evening.    VIOS AEROSOL DELIVERY SYSTEM VIN    USE AS DIRESTED       Review of patient's allergies indicates:  No Known Allergies    Past Surgical History:   Procedure Laterality Date    ABLATION OF " "ARRHYTHMOGENIC FOCUS FOR ATRIAL FIBRILLATION N/A 2/27/2020    Procedure: Ablation atrial fibrillation;  Surgeon: Gio Brown MD;  Location: [facility] EP LAB;  Service: Cardiology;  Laterality: N/A;  afib, DAMIEN (cx if SR), PVI, PITO, anes, MB, 3 Prep    CHOLECYSTECTOMY         Family History   Problem Relation Age of Onset    Glaucoma Mother     Cataracts Mother     Cataracts Father     Glaucoma Sister     Cataracts Sister     Glaucoma Maternal Aunt     No Known Problems Brother     No Known Problems Daughter     No Known Problems Son     Prostate cancer Neg Hx        Social History     Socioeconomic History    Marital status: Legally      Spouse name: Not on file    Number of children: 5    Years of education: Not on file    Highest education level: Not on file   Occupational History    Occupation: disabled   Social Needs    Financial resource strain: Not on file    Food insecurity:     Worry: Not on file     Inability: Not on file    Transportation needs:     Medical: Not on file     Non-medical: Not on file   Tobacco Use    Smoking status: Never Smoker    Smokeless tobacco: Never Used   Substance and Sexual Activity    Alcohol use: No     Comment: occ    Drug use: No    Sexual activity: Yes     Partners: Female   Lifestyle    Physical activity:     Days per week: Not on file     Minutes per session: Not on file    Stress: Rather much   Relationships    Social connections:     Talks on phone: Not on file     Gets together: Not on file     Attends Jewish service: Not on file     Active member of club or organization: Not on file     Attends meetings of clubs or organizations: Not on file     Relationship status: Not on file   Other Topics Concern    Not on file   Social History Narrative    Not on file       Vitals:    06/12/20 0926   BP: (!) 146/80   Pulse: 95   Weight: 90 kg (198 lb 6.6 oz)   Height: 5' 5" (1.651 m)   PainSc: 0-No pain   PainLoc: Foot       Hemoglobin A1C "   Date Value Ref Range Status   05/19/2020 7.7 (H) 4.0 - 5.6 % Final     Comment:     ADA Screening Guidelines:  5.7-6.4%  Consistent with prediabetes  >or=6.5%  Consistent with diabetes  High levels of fetal hemoglobin interfere with the HbA1C  assay. Heterozygous hemoglobin variants (HbS, HgC, etc)do  not significantly interfere with this assay.   However, presence of multiple variants may affect accuracy.     12/13/2019 7.5 (H) 4.0 - 5.6 % Final     Comment:     ADA Screening Guidelines:  5.7-6.4%  Consistent with prediabetes  >or=6.5%  Consistent with diabetes  High levels of fetal hemoglobin interfere with the HbA1C  assay. Heterozygous hemoglobin variants (HbS, HgC, etc)do  not significantly interfere with this assay.   However, presence of multiple variants may affect accuracy.     08/08/2019 9.7 (H) 4.0 - 5.6 % Final     Comment:     ADA Screening Guidelines:  5.7-6.4%  Consistent with prediabetes  >or=6.5%  Consistent with diabetes  High levels of fetal hemoglobin interfere with the HbA1C  assay. Heterozygous hemoglobin variants (HbS, HgC, etc)do  not significantly interfere with this assay.   However, presence of multiple variants may affect accuracy.         Review of Systems   Constitutional: Negative for chills and fever.   Respiratory: Negative for shortness of breath.    Cardiovascular: Negative for chest pain, palpitations, orthopnea, claudication and leg swelling.   Gastrointestinal: Negative for diarrhea, nausea and vomiting.   Musculoskeletal: Negative for joint pain.   Skin: Negative for rash.   Neurological: Positive for tingling and sensory change. Negative for dizziness, focal weakness and weakness.   Psychiatric/Behavioral: Negative.          Objective:      PHYSICAL EXAM: Apperance: Alert and orient in no distress,well developed, and with good attention to grooming and body habits  Patient presents ambulating in tennis shoes.   LOWER EXTREMITY EXAM:  VASCULAR: Dorsalis pedis pulses 2/4  bilateral and Posterior Tibial pulses 2/4 bilateral. Capillary fill time <3 seconds bilateral. No edema observed bilateral. Varicosities absent bilateral. Skin temperature of the lower extremities is warm to warm, proximal to distal. Hair growth dim bilateral.  DERMATOLOGICAL: No skin rashes, subcutaneous nodules, lesions, or ulcers observed bilateral. Nails 1,2,3,4,5 bilateral elongated, thickened, and discolored with subungual debris. Webspaces 1,2,3,4 clean, dry and without evidence of break in skin integrity bilateral. Dry and scaly skin noted plantarly bilateral.  Left hallux nail partially avulsed and mild maceration noted to distal nail bed. Minimal blood drainage noted. No erythema, increased temp noted.   NEUROLOGICAL: Light touch, sharp-dull, proprioception all present and equal bilaterally.  Vibratory sensation diminished at bilateral hallux and navicular. Protective sensation absent at sites as tested with a Max-Kimmie 5.07 monofilament.   MUSCULOSKELETAL: Muscle strength is 5/5 for foot inverters, everters, plantarflexors, and dorsiflexors. Muscle tone is normal. Pain on palpation of left medial malleoli.         Assessment:       Encounter Diagnoses   Name Primary?    Avulsed toenail, initial encounter - Left Foot Yes    Type II diabetes mellitus with neurological manifestations     Dermatophytosis of nail          Plan:   Avulsed toenail, initial encounter - Left Foot    Type II diabetes mellitus with neurological manifestations    Dermatophytosis of nail      I counseled the patient on his conditions, regarding findings of my examination, my impressions, and usual treatment plan.   Greater than 15 minutes of a 20 minute appointment spent on education about the diabetic foot, neuropathy, and prevention of limb loss.  Shoe inspection. Diabetic Foot Education. Patient reminded of the importance of good nutrition and blood sugar control to help prevent podiatric complications of diabetes.  Patient instructed on proper foot hygeine. We discussed wearing proper shoe gear, daily foot inspections, never walking without protective shoe gear, never putting sharp instruments to feet.    With patient's permission, nails 1-5 bilateral were debrided/trimmed in length and thickness aggressively to their soft tissue attachment mechanically and with electric , removing all offending nail and debris. Patient relates relief following the procedure.  Patient instructed to purchase Betadine(Iodine) and apply with q-tip toe left hallux once daily and cover with band-aid.   The patient is alerted to watch for any signs of infection (redness, pus, pain, increased swelling or fever) and call if such occurs. Home wound care instructions are provided.  Patient  will continue to monitor the areas daily, inspect feet, wear protective shoe gear when ambulatory, moisturizer to maintain skin integrity. Patient reminded of the importance of good nutrition and blood sugar control to help prevent podiatric complications of diabetes.  Patient to return 1 week or sooner if needed.              Barb Veras DPM  [facility]

## 2020-06-15 ENCOUNTER — PATIENT OUTREACH (OUTPATIENT)
Dept: ADMINISTRATIVE | Facility: HOSPITAL | Age: 65
End: 2020-06-15

## 2020-06-15 NOTE — PROGRESS NOTES
Chart audit. Patient already had diabetic eye exam schedule on 06/30/2020 with Dr. Capps. Patient also had a recent hemoglobin a1c on 05/19/2020.

## 2020-06-18 ENCOUNTER — PATIENT OUTREACH (OUTPATIENT)
Dept: ADMINISTRATIVE | Facility: HOSPITAL | Age: 65
End: 2020-06-18

## 2020-06-18 NOTE — PROGRESS NOTES
Last /80. Need SHELLEY for 2013 or any recnt colon report, not in media or legacy, or CE.  HM reviewed and updated. Immunizations abstracted.  Care Everywhere abstracted. PSA due 6-27. CC note updated.  Health Maintenance Due   Topic    TETANUS VACCINE     Urine Microalbumin      Previsit chart audit completed.  *KDL*

## 2020-06-19 ENCOUNTER — HOSPITAL ENCOUNTER (OUTPATIENT)
Dept: RADIOLOGY | Facility: HOSPITAL | Age: 65
Discharge: HOME | End: 2020-06-19
Attending: PODIATRIST
Payer: MEDICARE

## 2020-06-19 ENCOUNTER — TELEPHONE (OUTPATIENT)
Dept: PODIATRY | Facility: CLINIC | Age: 65
End: 2020-06-19

## 2020-06-19 ENCOUNTER — CLINICAL SUPPORT (OUTPATIENT)
Dept: PODIATRY | Facility: CLINIC | Age: 65
End: 2020-06-19
Payer: MEDICARE

## 2020-06-19 ENCOUNTER — TELEPHONE (OUTPATIENT)
Dept: INTERNAL MEDICINE | Facility: CLINIC | Age: 65
End: 2020-06-19

## 2020-06-19 VITALS
HEART RATE: 84 BPM | WEIGHT: 198 LBS | SYSTOLIC BLOOD PRESSURE: 105 MMHG | BODY MASS INDEX: 32.99 KG/M2 | DIASTOLIC BLOOD PRESSURE: 65 MMHG | HEIGHT: 65 IN

## 2020-06-19 DIAGNOSIS — L97.529 ISCHEMIC TOE ULCER, LEFT, WITH UNSPECIFIED SEVERITY: Primary | ICD-10-CM

## 2020-06-19 DIAGNOSIS — L97.529 ISCHEMIC TOE ULCER, LEFT, WITH UNSPECIFIED SEVERITY: ICD-10-CM

## 2020-06-19 DIAGNOSIS — L03.116 CELLULITIS OF LEFT FOOT: ICD-10-CM

## 2020-06-19 PROCEDURE — 99214 PR OFFICE/OUTPT VISIT, EST, LEVL IV, 30-39 MIN: ICD-10-PCS | Mod: HCNC,S$GLB,, | Performed by: PODIATRIST

## 2020-06-19 PROCEDURE — 73630 X-RAY EXAM OF FOOT: CPT | Mod: TC,HCNC,LT

## 2020-06-19 PROCEDURE — 73630 X-RAY EXAM OF FOOT: CPT | Mod: 26,HCNC,LT, | Performed by: RADIOLOGY

## 2020-06-19 PROCEDURE — 99999 PR PBB SHADOW E&M-EST. PATIENT-LVL V: ICD-10-PCS | Mod: PBBFAC,HCNC,,

## 2020-06-19 PROCEDURE — 99214 OFFICE O/P EST MOD 30 MIN: CPT | Mod: HCNC,S$GLB,, | Performed by: PODIATRIST

## 2020-06-19 PROCEDURE — 73630 XR FOOT COMPLETE 3 VIEW LEFT: ICD-10-PCS | Mod: 26,HCNC,LT, | Performed by: RADIOLOGY

## 2020-06-19 PROCEDURE — 99999 PR PBB SHADOW E&M-EST. PATIENT-LVL V: CPT | Mod: PBBFAC,HCNC,,

## 2020-06-19 RX ORDER — SILVER SULFADIAZINE 10 G/1000G
CREAM TOPICAL 2 TIMES DAILY
Qty: 50 G | Refills: 0 | Status: SHIPPED | OUTPATIENT
Start: 2020-06-19 | End: 2021-08-03

## 2020-06-19 RX ORDER — AMOXICILLIN AND CLAVULANATE POTASSIUM 875; 125 MG/1; MG/1
1 TABLET, FILM COATED ORAL EVERY 12 HOURS
Qty: 20 TABLET | Refills: 0 | Status: ON HOLD | OUTPATIENT
Start: 2020-06-19 | End: 2020-07-03 | Stop reason: HOSPADM

## 2020-06-19 NOTE — TELEPHONE ENCOUNTER
----- Message from Marck Moore sent at 6/19/2020  1:01 PM CDT -----  .Type:  Needs Medical Advice    [facility]  [facility]  Symptoms (please be specific):  How long has patient had these symptoms:   Pharmacy name and phone #:    Would the patient rather a call back or a response via [facility]? Call   Best Call Back Number:  [phone]   Additional Information:  Caller is requesting a call back from the nurse in regards to the caller knowing if the office received her fax please

## 2020-06-19 NOTE — PROGRESS NOTES
This 63 y/o male present today for left toe. Patient states he has been applying the Betadine as instructed. Patient states the toe became swollen and red several days ago. Patient states he has been in pain and just didn't;t call the office.    Patient was seen by myself.       PHYSICAL EXAM: Apperance: Alert and orient in no distress,well developed, and with good attention to grooming and body habits  Patient presents ambulating in tennis shoes.   Lower Extremity Exam  VASCULAR: Dorsalis pedis pulses 1/4 left and Posterior Tibial pulses 1/4 left. Capillary fill time <4 seconds left. Mild edema observed left. Varicosities present left. Skin temperature of the lower extremities is warm to warm, proximal to distal. Hair growth dim left. (--) lymphangitis or (+) cellulitis noted left.  DERMATOLOGICAL: (+) edema, (+) erythema, (--) malodor, (--)  drainage, (+) warmth to left foot.  Ulcer noted to left hallux with eschar base and with a 1 mm yellow/white border and hyperkeratosis surrounding ulcer. Ulcer measurement 2cm x 1cm.  The ulcer does not extend into deeper tissue and (--) sinus tracts exist.  The dorsum surface of the feet are soft and supple.  The plantar aspects of feet are dry and scaly. Webspaces 1,2,3,4 clean, dry and without evidence of break in skin integrity left.   NEUROLOGICAL: Light touch, sharp-dull, proprioception all present and equal bilaterally.    MUSCULOSKELETAL: Muscle strength is 5/5 for foot inverters, everters, plantarflexors, and dorsiflexors. Muscle tone is normal.  Inspection/palpation of bone, joints and muscles unremarkable with the exception of that noted above.                  With patient's permission, the wound dressed with Silvadene and toe football. Patient was given instructions on daily dressing changes. Patient was also instructed on the importance of keeping the wound and dressings clean dry and intact and keeping pressure off the wound area until complete healing of the  wound.The patient is alerted to watch for any signs of infection (redness, pus, pain, increased swelling or fever) and call if such occurs. Home wound care instructions are provided.  Ordered procalcitonin, ESR, CRP, and CBC to be completed today.   Ordered left foot x-ray to be completed today.   Ordered arterial ultrasound to be completed Wednesday.   Prescription written for Augmentin to be taken twice daily.  Prescription written for topical Silvadene to be applied to left toe once daily.   Patient to return in 1 week or sooner if needed.

## 2020-06-23 ENCOUNTER — DOCUMENTATION ONLY (OUTPATIENT)
Dept: CARDIOLOGY | Facility: HOSPITAL | Age: 65
End: 2020-06-23

## 2020-06-23 ENCOUNTER — TELEPHONE (OUTPATIENT)
Dept: INTERNAL MEDICINE | Facility: CLINIC | Age: 65
End: 2020-06-23
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
## 2020-06-23 NOTE — TELEPHONE ENCOUNTER
----- Message from Skylar Whitt sent at 6/23/2020  7:08 AM CDT -----  Please call pt @ [phone] regarding appt today, pt have questions for nurse, urgent.

## 2020-06-23 NOTE — TELEPHONE ENCOUNTER
Patient called in wanting to have his 1 pm appointment for today rescheduled stating that he was given the wrong information regarding the date and time of his appointment. Patient appointment will be reschedule, patient will be notified.

## 2020-06-23 NOTE — PROGRESS NOTES
"Pt presented to clinic at 6:55 am for his 1:00 appointment.  He wanted to be done now.  I called and spoke to him after he got upset with the check in lady over the phone.  I informed him that I would love to do him early, but my schedule was booked.  I have no openings until my lunch break.  He stated that his appointment was at 7:00 am.  Then we got disconnected. I called him back and said I am sorry that we got disconnected to which he said "I hung up on you we were not disconnected."  I said oh ok, well would I am reviewing the appointments you have had and I do not see that you were ever booked for 7:00 am.  I told him I would be happy to skip my lunch break today to do his test.  He said h*ll no, that this was the 3rd time we have done this to him.  I offered to reschedule him and hung up on me again.      "

## 2020-06-23 NOTE — TELEPHONE ENCOUNTER
----- Message from [facility] sent at 6/23/2020 12:04 PM CDT -----  Elayne called to discuss pt. She stated that there's an open investigation on pt with ETS services she has a few questions please advise Toya at [phone]

## 2020-06-24 ENCOUNTER — NURSE TRIAGE (OUTPATIENT)
Dept: ADMINISTRATIVE | Facility: CLINIC | Age: 65
End: 2020-06-24

## 2020-06-24 ENCOUNTER — PATIENT OUTREACH (OUTPATIENT)
Dept: ADMINISTRATIVE | Facility: OTHER | Age: 65
End: 2020-06-24

## 2020-06-24 DIAGNOSIS — Z12.11 ENCOUNTER FOR FIT (FECAL IMMUNOCHEMICAL TEST) SCREENING: Primary | ICD-10-CM

## 2020-06-24 NOTE — PROGRESS NOTES
Patient's chart was reviewed.   Requested updates within Care Everywhere.  Immunizations reconciled.    Health Maintenance was updated.  Fit kit order placed

## 2020-06-24 NOTE — TELEPHONE ENCOUNTER
Pt seen by provider on 6/24/20 in ED for foot pain. Denied any cough, fever, or difficulty breathing. No contact needed per post procedure protocol.    Reason for Disposition   Patient already left for the hospital/clinic    Protocols used: NO CONTACT OR DUPLICATE CONTACT CALL-A-OH

## 2020-06-26 ENCOUNTER — OFFICE VISIT (OUTPATIENT)
Dept: INTERNAL MEDICINE | Facility: CLINIC | Age: 65
DRG: 256 | End: 2020-06-26
Payer: MEDICARE

## 2020-06-26 ENCOUNTER — HOSPITAL ENCOUNTER (INPATIENT)
Facility: HOSPITAL | Age: 65
LOS: 7 days | Discharge: HOME HEALTH | DRG: 256 | End: 2020-07-03
Attending: FAMILY MEDICINE | Admitting: EMERGENCY MEDICINE
Payer: MEDICARE

## 2020-06-26 VITALS
HEART RATE: 89 BPM | DIASTOLIC BLOOD PRESSURE: 65 MMHG | TEMPERATURE: 98 F | BODY MASS INDEX: 33.38 KG/M2 | SYSTOLIC BLOOD PRESSURE: 123 MMHG | HEIGHT: 65 IN | WEIGHT: 200.38 LBS | OXYGEN SATURATION: 95 %

## 2020-06-26 DIAGNOSIS — I15.2 HYPERTENSION ASSOCIATED WITH DIABETES: Chronic | ICD-10-CM

## 2020-06-26 DIAGNOSIS — E11.9 TYPE 2 DIABETES MELLITUS WITHOUT COMPLICATION: ICD-10-CM

## 2020-06-26 DIAGNOSIS — E11.59 HYPERTENSION ASSOCIATED WITH DIABETES: Chronic | ICD-10-CM

## 2020-06-26 DIAGNOSIS — R06.02 SOB (SHORTNESS OF BREATH): ICD-10-CM

## 2020-06-26 DIAGNOSIS — E11.3299 TYPE 2 DIABETES MELLITUS WITH MILD NONPROLIFERATIVE RETINOPATHY, WITH LONG-TERM CURRENT USE OF INSULIN, MACULAR EDEMA PRESENCE UNSPECIFIED, UNSPECIFIED LATERALITY: ICD-10-CM

## 2020-06-26 DIAGNOSIS — R06.02 SHORTNESS OF BREATH: ICD-10-CM

## 2020-06-26 DIAGNOSIS — L97.529 ULCER OF LEFT FOOT, UNSPECIFIED ULCER STAGE: ICD-10-CM

## 2020-06-26 DIAGNOSIS — Z79.4 TYPE 2 DIABETES MELLITUS WITH MILD NONPROLIFERATIVE RETINOPATHY, WITH LONG-TERM CURRENT USE OF INSULIN, MACULAR EDEMA PRESENCE UNSPECIFIED, UNSPECIFIED LATERALITY: ICD-10-CM

## 2020-06-26 DIAGNOSIS — M79.676 TOE PAIN: ICD-10-CM

## 2020-06-26 DIAGNOSIS — I96 GANGRENE OF LEFT FOOT: Primary | ICD-10-CM

## 2020-06-26 DIAGNOSIS — I50.42 CHRONIC COMBINED SYSTOLIC AND DIASTOLIC CONGESTIVE HEART FAILURE: Chronic | ICD-10-CM

## 2020-06-26 PROBLEM — I48.0 PAF (PAROXYSMAL ATRIAL FIBRILLATION): Chronic | Status: ACTIVE | Noted: 2017-03-17

## 2020-06-26 PROBLEM — N18.30 STAGE 3 CHRONIC KIDNEY DISEASE: Chronic | Status: ACTIVE | Noted: 2020-04-29

## 2020-06-26 PROBLEM — L97.523 DIABETIC ULCER OF TOE OF LEFT FOOT ASSOCIATED WITH TYPE 2 DIABETES MELLITUS, WITH NECROSIS OF MUSCLE: Status: ACTIVE | Noted: 2020-06-26

## 2020-06-26 PROBLEM — E11.621 DIABETIC ULCER OF TOE OF LEFT FOOT ASSOCIATED WITH TYPE 2 DIABETES MELLITUS, WITH NECROSIS OF MUSCLE: Status: ACTIVE | Noted: 2020-06-26

## 2020-06-26 PROBLEM — R79.89 ELEVATED TROPONIN: Status: ACTIVE | Noted: 2020-06-26

## 2020-06-26 LAB
ALBUMIN SERPL BCP-MCNC: 2.7 G/DL (ref 3.5–5.2)
ALP SERPL-CCNC: 64 U/L (ref 55–135)
ALT SERPL W/O P-5'-P-CCNC: 19 U/L (ref 10–44)
ANION GAP SERPL CALC-SCNC: 8 MMOL/L (ref 8–16)
APTT BLDCRRT: 27.4 SEC (ref 21–32)
AST SERPL-CCNC: 15 U/L (ref 10–40)
BILIRUB SERPL-MCNC: 0.2 MG/DL (ref 0.1–1)
BILIRUB UR QL STRIP: NEGATIVE
BUN SERPL-MCNC: 36 MG/DL (ref 8–23)
CALCIUM SERPL-MCNC: 9.4 MG/DL (ref 8.7–10.5)
CHLORIDE SERPL-SCNC: 104 MMOL/L (ref 95–110)
CK SERPL-CCNC: 78 U/L (ref 20–200)
CK SERPL-CCNC: 87 U/L (ref 20–200)
CLARITY UR REFRACT.AUTO: CLEAR
CO2 SERPL-SCNC: 29 MMOL/L (ref 23–29)
COLOR UR AUTO: YELLOW
CREAT SERPL-MCNC: 1.4 MG/DL (ref 0.5–1.4)
D DIMER PPP IA.FEU-MCNC: 0.58 MG/L FEU
EST. GFR  (AFRICAN AMERICAN): >60 ML/MIN/1.73 M^2
EST. GFR  (NON AFRICAN AMERICAN): 53.1 ML/MIN/1.73 M^2
GLUCOSE SERPL-MCNC: 76 MG/DL (ref 70–110)
GLUCOSE UR QL STRIP: NEGATIVE
HGB UR QL STRIP: NEGATIVE
INR PPP: 1 (ref 0.8–1.2)
KETONES UR QL STRIP: NEGATIVE
LACTATE SERPL-SCNC: 1.5 MMOL/L (ref 0.5–2.2)
LEUKOCYTE ESTERASE UR QL STRIP: NEGATIVE
NITRITE UR QL STRIP: NEGATIVE
PH UR STRIP: 6 [PH] (ref 5–8)
POCT GLUCOSE: 58 MG/DL (ref 70–110)
POTASSIUM SERPL-SCNC: 4.3 MMOL/L (ref 3.5–5.1)
PROT SERPL-MCNC: 8 G/DL (ref 6–8.4)
PROT UR QL STRIP: NEGATIVE
PROTHROMBIN TIME: 10.1 SEC (ref 9–12.5)
SARS-COV-2 RDRP RESP QL NAA+PROBE: NEGATIVE
SODIUM SERPL-SCNC: 141 MMOL/L (ref 136–145)
SP GR UR STRIP: <=1.005 (ref 1–1.03)
TROPONIN I SERPL DL<=0.01 NG/ML-MCNC: 0.07 NG/ML (ref 0–0.03)
TROPONIN I SERPL DL<=0.01 NG/ML-MCNC: 0.07 NG/ML (ref 0–0.03)
URN SPEC COLLECT METH UR: ABNORMAL
UROBILINOGEN UR STRIP-ACNC: NEGATIVE EU/DL

## 2020-06-26 PROCEDURE — 99499 UNLISTED E&M SERVICE: CPT | Mod: HCNC,S$GLB,, | Performed by: FAMILY MEDICINE

## 2020-06-26 PROCEDURE — 99215 PR OFFICE/OUTPT VISIT, EST, LEVL V, 40-54 MIN: ICD-10-PCS | Mod: HCNC,S$GLB,, | Performed by: FAMILY MEDICINE

## 2020-06-26 PROCEDURE — 63600175 PHARM REV CODE 636 W HCPCS: Mod: HCNC,ER | Performed by: NURSE PRACTITIONER

## 2020-06-26 PROCEDURE — 3008F PR BODY MASS INDEX (BMI) DOCUMENTED: ICD-10-PCS | Mod: HCNC,CPTII,S$GLB, | Performed by: FAMILY MEDICINE

## 2020-06-26 PROCEDURE — 93010 ELECTROCARDIOGRAM REPORT: CPT | Mod: HCNC,,, | Performed by: INTERNAL MEDICINE

## 2020-06-26 PROCEDURE — 3008F BODY MASS INDEX DOCD: CPT | Mod: HCNC,CPTII,S$GLB, | Performed by: FAMILY MEDICINE

## 2020-06-26 PROCEDURE — 63600175 PHARM REV CODE 636 W HCPCS: Mod: HCNC,ER | Performed by: EMERGENCY MEDICINE

## 2020-06-26 PROCEDURE — 93005 ELECTROCARDIOGRAM TRACING: CPT | Mod: HCNC,ER

## 2020-06-26 PROCEDURE — 96374 THER/PROPH/DIAG INJ IV PUSH: CPT | Mod: HCNC,ER

## 2020-06-26 PROCEDURE — 25000003 PHARM REV CODE 250: Mod: HCNC | Performed by: INTERNAL MEDICINE

## 2020-06-26 PROCEDURE — 93010 EKG 12-LEAD: ICD-10-PCS | Mod: HCNC,,, | Performed by: INTERNAL MEDICINE

## 2020-06-26 PROCEDURE — 83605 ASSAY OF LACTIC ACID: CPT | Mod: HCNC,ER

## 2020-06-26 PROCEDURE — 63600175 PHARM REV CODE 636 W HCPCS: Mod: HCNC,ER | Performed by: FAMILY MEDICINE

## 2020-06-26 PROCEDURE — U0002 COVID-19 LAB TEST NON-CDC: HCPCS | Mod: HCNC,ER

## 2020-06-26 PROCEDURE — 3078F DIAST BP <80 MM HG: CPT | Mod: HCNC,CPTII,S$GLB, | Performed by: FAMILY MEDICINE

## 2020-06-26 PROCEDURE — 3074F SYST BP LT 130 MM HG: CPT | Mod: HCNC,CPTII,S$GLB, | Performed by: FAMILY MEDICINE

## 2020-06-26 PROCEDURE — 21400001 HC TELEMETRY ROOM: Mod: HCNC

## 2020-06-26 PROCEDURE — 82550 ASSAY OF CK (CPK): CPT | Mod: HCNC,ER

## 2020-06-26 PROCEDURE — 99499 RISK ADDL DX/OHS AUDIT: ICD-10-PCS | Mod: HCNC,S$GLB,, | Performed by: FAMILY MEDICINE

## 2020-06-26 PROCEDURE — 36415 COLL VENOUS BLD VENIPUNCTURE: CPT | Mod: HCNC

## 2020-06-26 PROCEDURE — 99215 OFFICE O/P EST HI 40 MIN: CPT | Mod: HCNC,S$GLB,, | Performed by: FAMILY MEDICINE

## 2020-06-26 PROCEDURE — 99999 PR PBB SHADOW E&M-EST. PATIENT-LVL V: ICD-10-PCS | Mod: PBBFAC,HCNC,, | Performed by: FAMILY MEDICINE

## 2020-06-26 PROCEDURE — 99285 EMERGENCY DEPT VISIT HI MDM: CPT | Mod: 25,HCNC,ER

## 2020-06-26 PROCEDURE — 82550 ASSAY OF CK (CPK): CPT | Mod: 91,HCNC

## 2020-06-26 PROCEDURE — 84484 ASSAY OF TROPONIN QUANT: CPT | Mod: HCNC,ER

## 2020-06-26 PROCEDURE — 3078F PR MOST RECENT DIASTOLIC BLOOD PRESSURE < 80 MM HG: ICD-10-PCS | Mod: HCNC,CPTII,S$GLB, | Performed by: FAMILY MEDICINE

## 2020-06-26 PROCEDURE — 25500020 PHARM REV CODE 255: Mod: HCNC,ER | Performed by: FAMILY MEDICINE

## 2020-06-26 PROCEDURE — 25000003 PHARM REV CODE 250: Mod: HCNC,ER | Performed by: FAMILY MEDICINE

## 2020-06-26 PROCEDURE — 81003 URINALYSIS AUTO W/O SCOPE: CPT | Mod: HCNC,ER

## 2020-06-26 PROCEDURE — 99999 PR PBB SHADOW E&M-EST. PATIENT-LVL V: CPT | Mod: PBBFAC,HCNC,, | Performed by: FAMILY MEDICINE

## 2020-06-26 PROCEDURE — 3074F PR MOST RECENT SYSTOLIC BLOOD PRESSURE < 130 MM HG: ICD-10-PCS | Mod: HCNC,CPTII,S$GLB, | Performed by: FAMILY MEDICINE

## 2020-06-26 PROCEDURE — 85730 THROMBOPLASTIN TIME PARTIAL: CPT | Mod: HCNC,ER

## 2020-06-26 PROCEDURE — 85379 FIBRIN DEGRADATION QUANT: CPT | Mod: HCNC,ER

## 2020-06-26 PROCEDURE — 80053 COMPREHEN METABOLIC PANEL: CPT | Mod: HCNC,ER

## 2020-06-26 PROCEDURE — 63600175 PHARM REV CODE 636 W HCPCS: Mod: HCNC | Performed by: NURSE PRACTITIONER

## 2020-06-26 PROCEDURE — 85610 PROTHROMBIN TIME: CPT | Mod: HCNC,ER

## 2020-06-26 PROCEDURE — 87040 BLOOD CULTURE FOR BACTERIA: CPT | Mod: HCNC

## 2020-06-26 PROCEDURE — 84484 ASSAY OF TROPONIN QUANT: CPT | Mod: 91,HCNC

## 2020-06-26 RX ORDER — TIOTROPIUM BROMIDE 18 UG/1
18 CAPSULE ORAL; RESPIRATORY (INHALATION) DAILY
Status: DISCONTINUED | OUTPATIENT
Start: 2020-06-27 | End: 2020-06-26

## 2020-06-26 RX ORDER — HEPARIN SODIUM,PORCINE/D5W 25000/250
12 INTRAVENOUS SOLUTION INTRAVENOUS CONTINUOUS
Status: DISCONTINUED | OUTPATIENT
Start: 2020-06-26 | End: 2020-06-29

## 2020-06-26 RX ORDER — DILTIAZEM HYDROCHLORIDE 60 MG/1
60 TABLET, FILM COATED ORAL 2 TIMES DAILY
Status: DISCONTINUED | OUTPATIENT
Start: 2020-06-26 | End: 2020-07-03 | Stop reason: HOSPADM

## 2020-06-26 RX ORDER — LISINOPRIL 10 MG/1
10 TABLET ORAL DAILY
Status: DISCONTINUED | OUTPATIENT
Start: 2020-06-27 | End: 2020-07-03 | Stop reason: HOSPADM

## 2020-06-26 RX ORDER — SODIUM CHLORIDE 0.9 % (FLUSH) 0.9 %
10 SYRINGE (ML) INJECTION
Status: DISCONTINUED | OUTPATIENT
Start: 2020-06-26 | End: 2020-07-03 | Stop reason: HOSPADM

## 2020-06-26 RX ORDER — GABAPENTIN 400 MG/1
800 CAPSULE ORAL 3 TIMES DAILY
Status: DISCONTINUED | OUTPATIENT
Start: 2020-06-27 | End: 2020-07-03 | Stop reason: HOSPADM

## 2020-06-26 RX ORDER — TAMSULOSIN HYDROCHLORIDE 0.4 MG/1
0.4 CAPSULE ORAL DAILY
Status: DISCONTINUED | OUTPATIENT
Start: 2020-06-27 | End: 2020-07-03 | Stop reason: HOSPADM

## 2020-06-26 RX ORDER — ASPIRIN 81 MG/1
81 TABLET ORAL DAILY
Status: DISCONTINUED | OUTPATIENT
Start: 2020-06-27 | End: 2020-07-03 | Stop reason: HOSPADM

## 2020-06-26 RX ORDER — TRAZODONE HYDROCHLORIDE 100 MG/1
200 TABLET ORAL NIGHTLY PRN
Status: DISCONTINUED | OUTPATIENT
Start: 2020-06-26 | End: 2020-07-03 | Stop reason: HOSPADM

## 2020-06-26 RX ORDER — IPRATROPIUM BROMIDE 0.5 MG/2.5ML
0.5 SOLUTION RESPIRATORY (INHALATION) EVERY 6 HOURS
Status: DISCONTINUED | OUTPATIENT
Start: 2020-06-27 | End: 2020-07-03 | Stop reason: HOSPADM

## 2020-06-26 RX ORDER — MORPHINE SULFATE 4 MG/ML
4 INJECTION, SOLUTION INTRAMUSCULAR; INTRAVENOUS
Status: COMPLETED | OUTPATIENT
Start: 2020-06-26 | End: 2020-06-26

## 2020-06-26 RX ORDER — VANCOMYCIN HCL IN 5 % DEXTROSE 1G/250ML
1000 PLASTIC BAG, INJECTION (ML) INTRAVENOUS
Status: DISCONTINUED | OUTPATIENT
Start: 2020-06-26 | End: 2020-06-26

## 2020-06-26 RX ORDER — HYDROCODONE BITARTRATE AND ACETAMINOPHEN 10; 325 MG/1; MG/1
1 TABLET ORAL EVERY 6 HOURS PRN
Status: DISCONTINUED | OUTPATIENT
Start: 2020-06-26 | End: 2020-07-03 | Stop reason: HOSPADM

## 2020-06-26 RX ORDER — LATANOPROST 50 UG/ML
1 SOLUTION/ DROPS OPHTHALMIC NIGHTLY
Status: DISCONTINUED | OUTPATIENT
Start: 2020-06-26 | End: 2020-07-03 | Stop reason: HOSPADM

## 2020-06-26 RX ORDER — INSULIN ASPART 100 [IU]/ML
1-10 INJECTION, SOLUTION INTRAVENOUS; SUBCUTANEOUS EVERY 6 HOURS PRN
Status: DISCONTINUED | OUTPATIENT
Start: 2020-06-26 | End: 2020-06-30 | Stop reason: SDUPTHER

## 2020-06-26 RX ORDER — FUROSEMIDE 40 MG/1
40 TABLET ORAL NIGHTLY
Status: DISCONTINUED | OUTPATIENT
Start: 2020-06-26 | End: 2020-06-30

## 2020-06-26 RX ORDER — BUDESONIDE 0.5 MG/2ML
0.5 INHALANT ORAL EVERY 12 HOURS
Status: DISCONTINUED | OUTPATIENT
Start: 2020-06-27 | End: 2020-07-03 | Stop reason: HOSPADM

## 2020-06-26 RX ORDER — SERTRALINE HYDROCHLORIDE 50 MG/1
100 TABLET, FILM COATED ORAL NIGHTLY
Status: DISCONTINUED | OUTPATIENT
Start: 2020-06-26 | End: 2020-07-03 | Stop reason: HOSPADM

## 2020-06-26 RX ORDER — FUROSEMIDE 80 MG/1
80 TABLET ORAL EVERY MORNING
Status: DISCONTINUED | OUTPATIENT
Start: 2020-06-27 | End: 2020-06-30

## 2020-06-26 RX ORDER — ARFORMOTEROL TARTRATE 15 UG/2ML
15 SOLUTION RESPIRATORY (INHALATION) 2 TIMES DAILY
Status: DISCONTINUED | OUTPATIENT
Start: 2020-06-27 | End: 2020-07-03 | Stop reason: HOSPADM

## 2020-06-26 RX ORDER — FLUTICASONE PROPIONATE AND SALMETEROL 250; 50 UG/1; UG/1
1 POWDER RESPIRATORY (INHALATION) 2 TIMES DAILY
Status: DISCONTINUED | OUTPATIENT
Start: 2020-06-26 | End: 2020-06-26

## 2020-06-26 RX ORDER — NITROGLYCERIN 0.4 MG/1
0.4 TABLET SUBLINGUAL EVERY 5 MIN PRN
Status: DISCONTINUED | OUTPATIENT
Start: 2020-06-26 | End: 2020-07-03 | Stop reason: HOSPADM

## 2020-06-26 RX ORDER — ACETAMINOPHEN 325 MG/1
650 TABLET ORAL EVERY 6 HOURS PRN
Status: DISCONTINUED | OUTPATIENT
Start: 2020-06-26 | End: 2020-07-03 | Stop reason: HOSPADM

## 2020-06-26 RX ORDER — HYDROCODONE BITARTRATE AND ACETAMINOPHEN 5; 325 MG/1; MG/1
1 TABLET ORAL EVERY 6 HOURS PRN
Status: DISCONTINUED | OUTPATIENT
Start: 2020-06-26 | End: 2020-07-03 | Stop reason: HOSPADM

## 2020-06-26 RX ORDER — VANCOMYCIN HCL IN 5 % DEXTROSE 1G/250ML
1000 PLASTIC BAG, INJECTION (ML) INTRAVENOUS ONCE
Status: COMPLETED | OUTPATIENT
Start: 2020-06-26 | End: 2020-06-26

## 2020-06-26 RX ORDER — GLUCAGON 1 MG
1 KIT INJECTION
Status: DISCONTINUED | OUTPATIENT
Start: 2020-06-26 | End: 2020-06-30 | Stop reason: SDUPTHER

## 2020-06-26 RX ORDER — METOPROLOL SUCCINATE 50 MG/1
50 TABLET, EXTENDED RELEASE ORAL DAILY
Status: DISCONTINUED | OUTPATIENT
Start: 2020-06-27 | End: 2020-07-03 | Stop reason: HOSPADM

## 2020-06-26 RX ORDER — ONDANSETRON 2 MG/ML
4 INJECTION INTRAMUSCULAR; INTRAVENOUS EVERY 6 HOURS PRN
Status: DISCONTINUED | OUTPATIENT
Start: 2020-06-26 | End: 2020-07-03 | Stop reason: HOSPADM

## 2020-06-26 RX ORDER — PANTOPRAZOLE SODIUM 40 MG/1
40 TABLET, DELAYED RELEASE ORAL DAILY
Status: DISCONTINUED | OUTPATIENT
Start: 2020-06-27 | End: 2020-07-03 | Stop reason: HOSPADM

## 2020-06-26 RX ORDER — ISOSORBIDE MONONITRATE 60 MG/1
60 TABLET, EXTENDED RELEASE ORAL DAILY
Status: DISCONTINUED | OUTPATIENT
Start: 2020-06-27 | End: 2020-07-03 | Stop reason: HOSPADM

## 2020-06-26 RX ORDER — ATORVASTATIN CALCIUM 40 MG/1
40 TABLET, FILM COATED ORAL NIGHTLY
Status: DISCONTINUED | OUTPATIENT
Start: 2020-06-26 | End: 2020-07-03 | Stop reason: HOSPADM

## 2020-06-26 RX ADMIN — HYDROCODONE BITARTRATE AND ACETAMINOPHEN 1 TABLET: 10; 325 TABLET ORAL at 11:06

## 2020-06-26 RX ADMIN — FUROSEMIDE 40 MG: 40 TABLET ORAL at 11:06

## 2020-06-26 RX ADMIN — VANCOMYCIN HYDROCHLORIDE 500 MG: 500 INJECTION, POWDER, LYOPHILIZED, FOR SOLUTION INTRAVENOUS at 05:06

## 2020-06-26 RX ADMIN — MORPHINE SULFATE 4 MG: 4 INJECTION INTRAVENOUS at 02:06

## 2020-06-26 RX ADMIN — DILTIAZEM HYDROCHLORIDE 60 MG: 60 TABLET, FILM COATED ORAL at 11:06

## 2020-06-26 RX ADMIN — MORPHINE SULFATE 4 MG: 4 INJECTION INTRAVENOUS at 07:06

## 2020-06-26 RX ADMIN — VANCOMYCIN HYDROCHLORIDE 750 MG: 750 INJECTION, POWDER, LYOPHILIZED, FOR SOLUTION INTRAVENOUS at 04:06

## 2020-06-26 RX ADMIN — SERTRALINE HYDROCHLORIDE 100 MG: 50 TABLET ORAL at 11:06

## 2020-06-26 RX ADMIN — VANCOMYCIN HYDROCHLORIDE 1000 MG: 1 INJECTION, POWDER, LYOPHILIZED, FOR SOLUTION INTRAVENOUS at 02:06

## 2020-06-26 RX ADMIN — LATANOPROST 1 DROP: 50 SOLUTION OPHTHALMIC at 11:06

## 2020-06-26 RX ADMIN — IOHEXOL 100 ML: 350 INJECTION, SOLUTION INTRAVENOUS at 04:06

## 2020-06-26 RX ADMIN — ATORVASTATIN CALCIUM 40 MG: 40 TABLET, FILM COATED ORAL at 11:06

## 2020-06-26 RX ADMIN — HEPARIN SODIUM 12 UNITS/KG/HR: 10000 INJECTION, SOLUTION INTRAVENOUS at 11:06

## 2020-06-26 NOTE — PROGRESS NOTES
Patient ID: Crow Green is a 65 y.o. male.    Chief Complaint: Leg & foot pain-difficulty with walking (Left )    HPI Patient is 65-year-old male who presents follow-up of left big toe.  He was seen by podiatrist approximately 1 week ago and prescribed Augmentin.  Has been ambulating with foot boot which has offered some additional support.  Reports that he has been taking antibiotics as prescribed.  Reports toe pain but has not worsened.  Denies any toe drainage. Denies fever and chills.    Family History   Problem Relation Age of Onset    Glaucoma Mother     Cataracts Mother     Cataracts Father     Glaucoma Sister     Cataracts Sister     Glaucoma Maternal Aunt     No Known Problems Brother     No Known Problems Daughter     No Known Problems Son     Prostate cancer Neg Hx      No current facility-administered medications for this visit.     Current Outpatient Medications:     ALCOHOL ANTISEPTIC PADS (ALCOHOL PREP PADS TOP), , Disp: , Rfl:     allopurinoL (ZYLOPRIM) 100 MG tablet, Take 1 tablet (100 mg total) by mouth once daily., Disp: 30 tablet, Rfl: 0    amoxicillin-clavulanate 875-125mg (AUGMENTIN) 875-125 mg per tablet, Take 1 tablet by mouth every 12 (twelve) hours. for 10 days, Disp: 20 tablet, Rfl: 0    atorvastatin (LIPITOR) 40 MG tablet, TAKE 1 TABLET EVERY EVENING, Disp: 90 tablet, Rfl: 4    azaTHIOprine (IMURAN) 50 mg Tab, Take 1 tablet (50 mg total) by mouth once daily., Disp: 90 tablet, Rfl: 0    baclofen (LIORESAL) 10 MG tablet, Take 1 tablet (10 mg total) by mouth once daily., Disp: 90 tablet, Rfl: 3    blood sugar diagnostic (TRUE METRIX GLUCOSE TEST STRIP) Strp, Use with blood glucose meter kit to test blood sugar four times  daily, Disp: 200 strip, Rfl: 99    blood-glucose meter (TRUE METRIX AIR GLUCOSE METER) kit, TEST FOUR TIMES DAILY BEFORE MEALS  AND EVERY NIGHT, Disp: 1 each, Rfl: 0    colchicine (COLCRYS) 0.6 mg tablet, Take 1 tablet (0.6 mg total) by mouth  once daily., Disp: 30 tablet, Rfl: 0    diclofenac sodium (VOLTAREN) 1 % Gel, Apply 2 g topically 4 (four) times daily., Disp: 1 Tube, Rfl: 5    diltiaZEM (CARDIZEM SR) 60 MG Cp12, Take 1 capsule (60 mg total) by mouth 2 (two) times daily., Disp: 60 capsule, Rfl: 11    ELIQUIS 5 mg Tab, Take 1 tablet (5 mg total) by mouth 2 (two) times daily., Disp: 60 tablet, Rfl: 0    fluticasone-salmeterol diskus inhaler 250-50 mcg, Inhale 1 puff into the lungs 2 (two) times daily. Controller, Disp: 60 each, Rfl: 11    food supplemt, lactose-reduced (ENSURE) Liqd, Take 118 mLs by mouth 3 (three) times daily with meals., Disp: 1 Bottle, Rfl: 4    furosemide (LASIX) 40 MG tablet, TAKE 2 TABLETS EVERY MORNING  AND TAKE 1 TABLET EVERY EVENING, Disp: 270 tablet, Rfl: 0    gabapentin (NEURONTIN) 800 MG tablet, Take 1 tablet (800 mg total) by mouth 3 (three) times daily., Disp: 270 tablet, Rfl: 4    glimepiride (AMARYL) 2 MG tablet, TAKE 1 TABLET (2 MG TOTAL) BY MOUTH BEFORE BREAKFAST., Disp: 90 tablet, Rfl: 3    HYDROcodone-acetaminophen (NORCO) 5-325 mg per tablet, Take 1 tablet by mouth every 6 (six) hours as needed for Pain., Disp: 9 tablet, Rfl: 0    inhalation spacing device, Use as directed for inhalation., Disp: 1 Device, Rfl: 0    insulin (LANTUS SOLOSTAR U-100 INSULIN) glargine 100 units/mL (3mL) SubQ pen, INJECT 80 UNITS SUBCUTANEOUSLY EVERY EVENING, Disp: 75 mL, Rfl: 4    insulin aspart U-100 (NOVOLOG FLEXPEN U-100 INSULIN) 100 unit/mL (3 mL) InPn pen, INJECT 20 UNITS SUBCUTANEOUSLY THREE TIMES DAILY WITH MEALS, Disp: 15 mL, Rfl: 0    insulin syringe-needle U-100 1 mL 31 gauge x 5/16 Syrg, , Disp: , Rfl:     ipratropium (ATROVENT) 0.02 % nebulizer solution, USE 1 VIAL VIA NEBULIZER FOUR TIMES DAILY, Disp: 937.5 mL, Rfl: 2    isosorbide mononitrate (IMDUR) 60 MG 24 hr tablet, Take 1 tablet (60 mg total) by mouth once daily., Disp: 90 tablet, Rfl: 3    ketorolac (TORADOL) 10 mg tablet, Take 1 tablet (10 mg  "total) by mouth every 8 (eight) hours as needed for Pain., Disp: 20 tablet, Rfl: 0    lancets 32 gauge Misc, 1 lancet by Misc.(Non-Drug; Combo Route) route 2 (two) times daily., Disp: 100 each, Rfl: 11    latanoprost 0.005 % ophthalmic solution, INSTILL 1 DROP INTO BOTH EYES EVERY EVENING, Disp: 1 Bottle, Rfl: 2    lisinopril 10 MG tablet, Take 1 tablet (10 mg total) by mouth once daily., Disp: 90 tablet, Rfl: 3    metFORMIN (GLUCOPHAGE-XR) 500 MG XR 24hr tablet, Take 2 tablets (1,000 mg total) by mouth 2 (two) times daily with meals., Disp: 360 tablet, Rfl: 3    methylPREDNISolone (MEDROL DOSEPACK) 4 mg tablet, Take as directed on the package., Disp: 1 Package, Rfl: 0    metoprolol succinate (TOPROL-XL) 50 MG 24 hr tablet, Take 1 tablet (50 mg total) by mouth once daily., Disp: 90 tablet, Rfl: 3    nitroGLYCERIN (NITROSTAT) 0.3 MG SL tablet, PLACE 1 TABLET UNDER THE TONGUE EVERY 5 MINUTES AS NEEDED FOR CHEST PAIN, NO MORE THAN 3 TABLETS IN ONE DAY, Disp: 100 tablet, Rfl: 0    OZEMPIC 1 mg/dose (2 mg/1.5 mL) PnIj, , Disp: , Rfl:     pantoprazole (PROTONIX) 40 MG tablet, Take 1 tablet (40 mg total) by mouth once daily., Disp: 30 tablet, Rfl: 11    pen needle, diabetic (BD ULTRA-FINE SHORT PEN NEEDLE) 31 gauge x 5/16" Ndle, Inject 1 each into the skin 3 (three) times daily., Disp: 100 each, Rfl: 11    predniSONE (DELTASONE) 5 MG tablet, Take 1 tablet (5 mg total) by mouth 2 (two) times daily., Disp: 60 tablet, Rfl: 0    sertraline (ZOLOFT) 100 MG tablet, Take 1 tablet (100 mg total) by mouth every evening., Disp: 90 tablet, Rfl: 4    sertraline (ZOLOFT) 50 MG tablet, TAKE 1 TABLET ONE TIME DAILY, Disp: 90 tablet, Rfl: 3    silver sulfADIAZINE 1% (SILVADENE) 1 % cream, Apply topically 2 (two) times daily., Disp: 50 g, Rfl: 0    tamsulosin (FLOMAX) 0.4 mg Cap, Take 1 capsule (0.4 mg total) by mouth once daily., Disp: 30 capsule, Rfl: 11    tiotropium (SPIRIVA WITH HANDIHALER) 18 mcg inhalation capsule, " "Inhale 1 capsule (18 mcg total) into the lungs once daily. Controller, Disp: 90 capsule, Rfl: 0    traZODone (DESYREL) 100 MG tablet, Take 2 tablets (200 mg total) by mouth every evening., Disp: 180 tablet, Rfl: 1    VIOS AEROSOL DELIVERY SYSTEM Shefali, USE AS DIRESTED, Disp: , Rfl: 0    aspirin (ECOTRIN) 81 MG EC tablet, Take 1 tablet (81 mg total) by mouth once daily., Disp: , Rfl: 0    Facility-Administered Medications Ordered in Other Visits:     morphine injection 4 mg, 4 mg, Intravenous, ED 1 Time, Gio Scott MD    Pharmacy to dose Vancomycin consult, , , Once **AND** vancomycin - pharmacy to dose, , Intravenous, pharmacy to manage frequency, Tim Carolina NP    Review of Systems   Constitutional: Negative for chills and fever.   Musculoskeletal: Positive for arthralgias (left foot, big toe).   Skin: Positive for color change and wound.       Objective:   /65 (BP Location: Left arm, Patient Position: Sitting, BP Method: X-Large (Automatic))   Pulse 89   Temp 97.9 °F (36.6 °C)   Ht 5' 5" (1.651 m)   Wt 90.9 kg (200 lb 6.4 oz)   SpO2 95%   BMI 33.35 kg/m²      Physical Exam  Constitutional:       Appearance: He is obese.   Cardiovascular:      Rate and Rhythm: Normal rate and regular rhythm.   Pulmonary:      Effort: Pulmonary effort is normal.      Breath sounds: Normal breath sounds.   Musculoskeletal:         General: Swelling and tenderness present.   Neurological:      Mental Status: He is alert.                 Assessment & Plan     Crow was seen today for leg & foot pain-difficulty with walking.    Diagnoses and all orders for this visit:    Ulcer of left foot, unspecified ulcer stage  Comments:  -i discussed case with Dr. Veras podiatrist who recommended hospital admission  Orders:  -     CBC auto differential; Future  -     Comprehensive metabolic panel; Future  -     C-reactive protein; Future  -     Sedimentation rate; Future  -     MRI Foot (Forefoot) Left Without " Contrast; Future        Disclaimer:  This note may have been prepared using voice recognition software, without extensive proofreading. As such, there could be errors within the text such as sound alike errors.

## 2020-06-26 NOTE — ED PROVIDER NOTES
Encounter Date: 6/26/2020       History     Chief Complaint   Patient presents with    Toe Pain     left great toe pain, on atb     The history is provided by the patient.   General Illness   Illness onset: left great toe pain was seen by Dr Perez today who consulted with Dr Veras podiatry for admission, due to ischemic toe, 1 week ago, seen 3 days ago in [facility].  The problem occurs continuously. The problem has been gradually worsening. The pain is at a severity of 7/10. Nothing relieves the symptoms. The symptoms are aggravated by movement and activity. Pertinent negatives include no fever, no decreased vision, no double vision, no eye itching, no photophobia, no abdominal pain, no constipation, no diarrhea, no nausea, no vomiting, no congestion, no ear discharge, no ear pain, no headaches, no hearing loss, no mouth sores, no rhinorrhea, no sore throat, no stridor, no swollen glands, no muscle aches, no neck pain, no neck stiffness, no cough, no shortness of breath, no URI, no wheezing, no rash, no diaper rash, no discharge, no pain and no eye redness.     Review of patient's allergies indicates:   Allergen Reactions    Protamine Hives     Urticaria, possible upper airway swelling     Past Medical History:   Diagnosis Date    Acute kidney injury     Arthritis     Asthma     Atrial fibrillation     Atrial fibrillation with RVR 8/7/2019    CHF (congestive heart failure)     Depression     Diabetes mellitus, type 2 Diagnosed in 2000    Elevated PSA     Hypertension     Sleep apnea      Past Surgical History:   Procedure Laterality Date    ABLATION OF ARRHYTHMOGENIC FOCUS FOR ATRIAL FIBRILLATION N/A 2/27/2020    Procedure: Ablation atrial fibrillation;  Surgeon: Gio Brown MD;  Location: [facility];  Service: Cardiology;  Laterality: N/A;  afib, DAMIEN (cx if SR), PVI, PITO, anes, MB, 3 Prep    CHOLECYSTECTOMY      SELECTIVE INJECTION OF ANESTHETIC AGENT AROUND LUMBAR  SPINAL NERVE ROOT BY TRANSFORAMINAL APPROACH Left 6/11/2020    Procedure: BLOCK, SPINAL NERVE ROOT, LUMBAR, SELECTIVE, TRANSFORAMINAL APPROACH Left L4-5, L5-S1 TFESI with RN IV sedation;  Surgeon: Dillan Tsai MD;  Location: [facility];  Service: Pain Management;  Laterality: Left;     Family History   Problem Relation Age of Onset    Glaucoma Mother     Cataracts Mother     Cataracts Father     Glaucoma Sister     Cataracts Sister     Glaucoma Maternal Aunt     No Known Problems Brother     No Known Problems Daughter     No Known Problems Son     Prostate cancer Neg Hx      Social History     Tobacco Use    Smoking status: Never Smoker    Smokeless tobacco: Never Used   Substance Use Topics    Alcohol use: No     Comment: occ    Drug use: No     Review of Systems   Constitutional: Negative for fever.   HENT: Negative for congestion, ear discharge, ear pain, hearing loss, mouth sores, rhinorrhea and sore throat.    Eyes: Negative for double vision, photophobia, pain, discharge, redness and itching.   Respiratory: Negative for cough, shortness of breath, wheezing and stridor.    Gastrointestinal: Negative for abdominal pain, constipation, diarrhea, nausea and vomiting.   Musculoskeletal: Positive for arthralgias. Negative for neck pain.        Left great toe pain and black    Skin: Negative for rash.   Neurological: Negative for headaches.   All other systems reviewed and are negative.      Physical Exam     Initial Vitals [06/26/20 1301]   BP Pulse Resp Temp SpO2   134/74 90 18 97.9 °F (36.6 °C) 96 %      MAP       --         Physical Exam    Nursing note and vitals reviewed.  Constitutional: Vital signs are normal. He appears well-developed and well-nourished. He is not diaphoretic. He is cooperative.  Non-toxic appearance. He does not have a sickly appearance. He does not appear ill. No distress.   HENT:   Head: Normocephalic. Head is without laceration.   Right Ear: External ear normal. No  swelling or tenderness.   Left Ear: External ear normal. No swelling or tenderness.   Eyes: Conjunctivae and lids are normal. Lids are everted and swept, no foreign bodies found.   Neck: Trachea normal, normal range of motion, full passive range of motion without pain and phonation normal. Neck supple. No thyromegaly present.   Cardiovascular: Normal rate, regular rhythm, normal heart sounds, intact distal pulses and normal pulses.   Abdominal: Soft. Normal appearance and bowel sounds are normal. He exhibits no distension, no ascites and no mass. There is no abdominal tenderness.   Musculoskeletal:        Left ankle: He exhibits decreased range of motion, swelling, deformity and abnormal pulse. He exhibits no ecchymosis and no laceration. Achilles tendon exhibits no pain, no defect and normal Jones's test results.        Feet:       Comments: Left great toe black, pulse found with doppler to dorsal foot, other toes warm with no discoloration   Lymphadenopathy:     He has no cervical adenopathy.     He has no axillary adenopathy.   Neurological: He is alert and oriented to person, place, and time. He has normal reflexes. No cranial nerve deficit or sensory deficit. GCS eye subscore is 4. GCS verbal subscore is 5. GCS motor subscore is 6.   Skin: Skin is warm, dry and intact. Capillary refill takes less than 2 seconds. No laceration, no rash and no abscess noted. No erythema.   Psychiatric: He has a normal mood and affect. His speech is normal and behavior is normal. Cognition and memory are normal.     2:45 PM patient c/o of SOB will add EKG, CE, CXR and D Dimer. Denies chest pain.      ED Course   Procedures  Labs Reviewed   CULTURE, BLOOD   CULTURE, BLOOD   COMPREHENSIVE METABOLIC PANEL   LACTIC ACID, PLASMA   URINALYSIS, REFLEX TO URINE CULTURE   SARS-COV-2 (COVID-19) QUALITATIVE PCR     EKG Readings: (Independently Interpreted)   Initial Reading: No STEMI. Previous EKG: Compared with most recent EKG Rhythm:  sinus rhythm. Heart Rate: 86. Ectopy: PACs. Conduction: LAFB. Clinical Impression: with PACs                Results for orders placed or performed during the hospital encounter of 06/26/20   Comprehensive metabolic panel   Result Value Ref Range    Sodium 141 136 - 145 mmol/L    Potassium 4.3 3.5 - 5.1 mmol/L    Chloride 104 95 - 110 mmol/L    CO2 29 23 - 29 mmol/L    Glucose 76 70 - 110 mg/dL    BUN, Bld 36 (H) 8 - 23 mg/dL    Creatinine 1.4 0.5 - 1.4 mg/dL    Calcium 9.4 8.7 - 10.5 mg/dL    Total Protein 8.0 6.0 - 8.4 g/dL    Albumin 2.7 (L) 3.5 - 5.2 g/dL    Total Bilirubin 0.2 0.1 - 1.0 mg/dL    Alkaline Phosphatase 64 55 - 135 U/L    AST 15 10 - 40 U/L    ALT 19 10 - 44 U/L    Anion Gap 8 8 - 16 mmol/L    eGFR if African American >60.0 >60 mL/min/1.73 m^2    eGFR if non  53.1 (A) >60 mL/min/1.73 m^2   Lactic acid, plasma #1   Result Value Ref Range    Lactate (Lactic Acid) 1.5 0.5 - 2.2 mmol/L   COVID-19 Rapid Screening   Result Value Ref Range    SARS-CoV-2 RNA, Amplification, Qual Negative Negative   D dimer, quantitative   Result Value Ref Range    D-Dimer 0.58 (H) <0.50 mg/L FEU   CK   Result Value Ref Range    CPK 87 20 - 200 U/L   Protime-INR   Result Value Ref Range    Prothrombin Time 10.1 9.0 - 12.5 sec    INR 1.0 0.8 - 1.2   APTT   Result Value Ref Range    aPTT 27.4 21.0 - 32.0 sec   Troponin I   Result Value Ref Range    Troponin I 0.065 (H) 0.000 - 0.026 ng/mL     *Note: Due to a large number of results and/or encounters for the requested time period, some results have not been displayed. A complete set of results can be found in Results Review.       Imaging Results          CTA Chest Non-Coronary (PE Study) (Final result)  Result time 06/26/20 19:35:00    Final result by DARELL Bose Sr., MD (06/26/20 19:35:00)                 Impression:      1. There is no pulmonary embolus.  2. There is a mild amount of atelectasis in the inferior aspect of the right middle lobe. There is a  mild amount of atelectasis in the inferior aspect of the lingula.  3. There is a streaky opacity in the right lower lobe.  This is characteristic of subsegmental atelectasis or scarring.  4. There is a 10 mm oval shaped sclerotic area in the T10 vertebral body.  This is characteristic of a bone island.  All CT scans at this facility use dose modulation, iterative reconstruction, and/or weight base dosing when appropriate to reduce radiation dose when appropriate to reduce radiation dose to as low as reasonably achievable.      Electronically signed by: Vimal Bose MD  Date:    06/26/2020  Time:    19:35             Narrative:    EXAMINATION:  CTA CHEST NON CORONARY    CLINICAL HISTORY:  PE suspected, intermediate prob, positive D-dimer;    TECHNIQUE:  Standard chest CT protocol was performed with IV contrast and 3D MIP reformats.  100 mL of Omnipaque 350 contrast material was used for this examination.    COMPARISON:  A chest x-ray performed on 06/26/2020.    FINDINGS:  The size of heart is within normal limits.  There is a mild amount of atherosclerosis.  There is no pulmonary embolus.  There is a mild amount of atelectasis in the inferior aspect of the right middle lobe.  There is a streaky opacity in the right lower lobe.  There is a mild amount of atelectasis in the inferior aspect of the lingula.  There is no pneumothorax or pleural effusion.  There is a 10 mm oval shaped sclerotic area in the T10 vertebral body.                               X-Ray Chest 1 View (Final result)  Result time 06/26/20 19:29:15    Final result by DARELL Bose Sr., MD (06/26/20 19:29:15)                 Impression:      1. There is a persistent mild amount of haziness in the base of the right lung.  This is characteristic of atelectasis or pneumonia.  2. The size of the heart is prominent.  This may be secondary to magnification.  .      Electronically signed by: Vimal Bose MD  Date:    06/26/2020  Time:    19:29              Narrative:    EXAMINATION:  XR CHEST 1 VIEW    CLINICAL HISTORY:  Shortness of breath    COMPARISON:  06/23/2020    FINDINGS:  The size of the heart is prominent.  There is a persistent mild amount of haziness in the base of the right lung.  The left lung is clear.  There is no pneumothorax.  The costophrenic angles are sharp.                                      All historical, clinical, radiographic, and laboratory findings were reviewed with the patient/family in detail along with the indications for transfer to [facility]  secondary to and a need for surgerical consult vascular consult and IV antibiotics given the diagnosis of Gangrene of left Great toe, and Shortness of breath..  All remaining questions and concerns were addressed at that time and the patient/family communicates understanding and agrees to proceed accordingly.  Similarly all pertinent details of the encounter were discussed with Hospital Medicine Cindy Ivan NP  at [facility] via secure chat. Dr Pineda who agrees to accept the patient in transfer based on the needs/patient preferences outlined above.  Patient will be transferred by [facility] secondary to a need for ongoing iv antibiotics en route. Benefits : surgical consult, surgical services,   Risk: MVC, Death, deterioration of status  Tim Carolina NP  4:25 PM                       Clinical Impression:       ICD-10-CM ICD-9-CM   1. Gangrene of left foot  I96 785.4   2. Toe pain  M79.676 729.5   3. Shortness of breath  R06.02 786.05   4. SOB (shortness of breath)  R06.02 786.05                                Tim Carolina NP  06/26/20 1631       Tim Carolina NP  06/26/20 1949

## 2020-06-27 PROBLEM — R79.89 ELEVATED TROPONIN: Chronic | Status: ACTIVE | Noted: 2020-06-26

## 2020-06-27 LAB
ANION GAP SERPL CALC-SCNC: 12 MMOL/L (ref 8–16)
APTT BLDCRRT: 40.2 SEC (ref 21–32)
APTT BLDCRRT: 41.9 SEC (ref 21–32)
BASOPHILS # BLD AUTO: 0.02 K/UL (ref 0–0.2)
BASOPHILS NFR BLD: 0.1 % (ref 0–1.9)
BUN SERPL-MCNC: 26 MG/DL (ref 8–23)
CALCIUM SERPL-MCNC: 9.2 MG/DL (ref 8.7–10.5)
CHLORIDE SERPL-SCNC: 99 MMOL/L (ref 95–110)
CO2 SERPL-SCNC: 28 MMOL/L (ref 23–29)
CREAT SERPL-MCNC: 1.1 MG/DL (ref 0.5–1.4)
DIFFERENTIAL METHOD: ABNORMAL
EOSINOPHIL # BLD AUTO: 0.1 K/UL (ref 0–0.5)
EOSINOPHIL NFR BLD: 1 % (ref 0–8)
ERYTHROCYTE [DISTWIDTH] IN BLOOD BY AUTOMATED COUNT: 14.2 % (ref 11.5–14.5)
EST. GFR  (AFRICAN AMERICAN): >60 ML/MIN/1.73 M^2
EST. GFR  (NON AFRICAN AMERICAN): >60 ML/MIN/1.73 M^2
GLUCOSE SERPL-MCNC: 127 MG/DL (ref 70–110)
HCT VFR BLD AUTO: 31.5 % (ref 40–54)
HGB BLD-MCNC: 9.7 G/DL (ref 14–18)
IMM GRANULOCYTES # BLD AUTO: 0.07 K/UL (ref 0–0.04)
IMM GRANULOCYTES NFR BLD AUTO: 0.5 % (ref 0–0.5)
LYMPHOCYTES # BLD AUTO: 2.7 K/UL (ref 1–4.8)
LYMPHOCYTES NFR BLD: 19.3 % (ref 18–48)
MCH RBC QN AUTO: 26.9 PG (ref 27–31)
MCHC RBC AUTO-ENTMCNC: 30.8 G/DL (ref 32–36)
MCV RBC AUTO: 88 FL (ref 82–98)
MONOCYTES # BLD AUTO: 0.9 K/UL (ref 0.3–1)
MONOCYTES NFR BLD: 6.1 % (ref 4–15)
NEUTROPHILS # BLD AUTO: 10.4 K/UL (ref 1.8–7.7)
NEUTROPHILS NFR BLD: 73 % (ref 38–73)
NRBC BLD-RTO: 0 /100 WBC
PLATELET # BLD AUTO: 244 K/UL (ref 150–350)
PMV BLD AUTO: 10.2 FL (ref 9.2–12.9)
POCT GLUCOSE: 113 MG/DL (ref 70–110)
POCT GLUCOSE: 166 MG/DL (ref 70–110)
POCT GLUCOSE: 169 MG/DL (ref 70–110)
POCT GLUCOSE: 68 MG/DL (ref 70–110)
POTASSIUM SERPL-SCNC: 3.7 MMOL/L (ref 3.5–5.1)
RBC # BLD AUTO: 3.6 M/UL (ref 4.6–6.2)
SODIUM SERPL-SCNC: 139 MMOL/L (ref 136–145)
TROPONIN I SERPL DL<=0.01 NG/ML-MCNC: 0.07 NG/ML (ref 0–0.03)
TROPONIN I SERPL DL<=0.01 NG/ML-MCNC: 0.07 NG/ML (ref 0–0.03)
VANCOMYCIN SERPL-MCNC: 11.5 UG/ML
WBC # BLD AUTO: 14.19 K/UL (ref 3.9–12.7)

## 2020-06-27 PROCEDURE — 99223 PR INITIAL HOSPITAL CARE,LEVL III: ICD-10-PCS | Mod: HCNC,,, | Performed by: PODIATRIST

## 2020-06-27 PROCEDURE — 63600175 PHARM REV CODE 636 W HCPCS: Mod: HCNC | Performed by: INTERNAL MEDICINE

## 2020-06-27 PROCEDURE — 84484 ASSAY OF TROPONIN QUANT: CPT | Mod: HCNC

## 2020-06-27 PROCEDURE — 25000242 PHARM REV CODE 250 ALT 637 W/ HCPCS: Mod: HCNC | Performed by: EMERGENCY MEDICINE

## 2020-06-27 PROCEDURE — 94760 N-INVAS EAR/PLS OXIMETRY 1: CPT | Mod: HCNC

## 2020-06-27 PROCEDURE — 36415 COLL VENOUS BLD VENIPUNCTURE: CPT | Mod: HCNC

## 2020-06-27 PROCEDURE — 25000003 PHARM REV CODE 250: Mod: HCNC | Performed by: INTERNAL MEDICINE

## 2020-06-27 PROCEDURE — C9399 UNCLASSIFIED DRUGS OR BIOLOG: HCPCS | Mod: HCNC | Performed by: INTERNAL MEDICINE

## 2020-06-27 PROCEDURE — 94640 AIRWAY INHALATION TREATMENT: CPT | Mod: HCNC

## 2020-06-27 PROCEDURE — 85730 THROMBOPLASTIN TIME PARTIAL: CPT | Mod: 91,HCNC

## 2020-06-27 PROCEDURE — 21400001 HC TELEMETRY ROOM: Mod: HCNC

## 2020-06-27 PROCEDURE — 85025 COMPLETE CBC W/AUTO DIFF WBC: CPT | Mod: HCNC

## 2020-06-27 PROCEDURE — 99223 1ST HOSP IP/OBS HIGH 75: CPT | Mod: HCNC,,, | Performed by: PODIATRIST

## 2020-06-27 PROCEDURE — 80048 BASIC METABOLIC PNL TOTAL CA: CPT | Mod: HCNC

## 2020-06-27 PROCEDURE — 80202 ASSAY OF VANCOMYCIN: CPT | Mod: HCNC

## 2020-06-27 PROCEDURE — 85730 THROMBOPLASTIN TIME PARTIAL: CPT | Mod: HCNC

## 2020-06-27 PROCEDURE — 63600175 PHARM REV CODE 636 W HCPCS: Mod: HCNC | Performed by: NURSE PRACTITIONER

## 2020-06-27 PROCEDURE — 25000242 PHARM REV CODE 250 ALT 637 W/ HCPCS: Mod: HCNC | Performed by: INTERNAL MEDICINE

## 2020-06-27 RX ORDER — MORPHINE SULFATE 2 MG/ML
2 INJECTION, SOLUTION INTRAMUSCULAR; INTRAVENOUS EVERY 6 HOURS PRN
Status: DISCONTINUED | OUTPATIENT
Start: 2020-06-27 | End: 2020-07-02

## 2020-06-27 RX ADMIN — LISINOPRIL 10 MG: 10 TABLET ORAL at 08:06

## 2020-06-27 RX ADMIN — BUDESONIDE 0.5 MG: 0.5 SUSPENSION RESPIRATORY (INHALATION) at 07:06

## 2020-06-27 RX ADMIN — HYDROCODONE BITARTRATE AND ACETAMINOPHEN 1 TABLET: 10; 325 TABLET ORAL at 10:06

## 2020-06-27 RX ADMIN — HYDROCODONE BITARTRATE AND ACETAMINOPHEN 1 TABLET: 10; 325 TABLET ORAL at 11:06

## 2020-06-27 RX ADMIN — DILTIAZEM HYDROCHLORIDE 60 MG: 60 TABLET, FILM COATED ORAL at 09:06

## 2020-06-27 RX ADMIN — IPRATROPIUM BROMIDE 0.5 MG: 0.5 SOLUTION RESPIRATORY (INHALATION) at 07:06

## 2020-06-27 RX ADMIN — METOPROLOL SUCCINATE 50 MG: 50 TABLET, EXTENDED RELEASE ORAL at 08:06

## 2020-06-27 RX ADMIN — FUROSEMIDE 40 MG: 40 TABLET ORAL at 09:06

## 2020-06-27 RX ADMIN — INSULIN DETEMIR 40 UNITS: 100 INJECTION, SOLUTION SUBCUTANEOUS at 09:06

## 2020-06-27 RX ADMIN — GABAPENTIN 800 MG: 400 CAPSULE ORAL at 02:06

## 2020-06-27 RX ADMIN — LATANOPROST 1 DROP: 50 SOLUTION OPHTHALMIC at 09:06

## 2020-06-27 RX ADMIN — TRAZODONE HYDROCHLORIDE 200 MG: 100 TABLET ORAL at 09:06

## 2020-06-27 RX ADMIN — HEPARIN SODIUM 12 UNITS/KG/HR: 10000 INJECTION, SOLUTION INTRAVENOUS at 09:06

## 2020-06-27 RX ADMIN — IPRATROPIUM BROMIDE 0.5 MG: 0.5 SOLUTION RESPIRATORY (INHALATION) at 12:06

## 2020-06-27 RX ADMIN — GABAPENTIN 800 MG: 400 CAPSULE ORAL at 09:06

## 2020-06-27 RX ADMIN — GABAPENTIN 800 MG: 400 CAPSULE ORAL at 08:06

## 2020-06-27 RX ADMIN — ARFORMOTEROL TARTRATE 15 MCG: 15 SOLUTION RESPIRATORY (INHALATION) at 07:06

## 2020-06-27 RX ADMIN — FUROSEMIDE 80 MG: 80 TABLET ORAL at 08:06

## 2020-06-27 RX ADMIN — ATORVASTATIN CALCIUM 40 MG: 40 TABLET, FILM COATED ORAL at 09:06

## 2020-06-27 RX ADMIN — SERTRALINE HYDROCHLORIDE 100 MG: 50 TABLET ORAL at 09:06

## 2020-06-27 RX ADMIN — DILTIAZEM HYDROCHLORIDE 60 MG: 60 TABLET, FILM COATED ORAL at 08:06

## 2020-06-27 RX ADMIN — VANCOMYCIN HYDROCHLORIDE 1250 MG: 1.25 INJECTION, POWDER, LYOPHILIZED, FOR SOLUTION INTRAVENOUS at 11:06

## 2020-06-27 RX ADMIN — MORPHINE SULFATE 2 MG: 2 INJECTION, SOLUTION INTRAMUSCULAR; INTRAVENOUS at 12:06

## 2020-06-27 RX ADMIN — INSULIN ASPART 2 UNITS: 100 INJECTION, SOLUTION INTRAVENOUS; SUBCUTANEOUS at 05:06

## 2020-06-27 RX ADMIN — ISOSORBIDE MONONITRATE 60 MG: 60 TABLET, EXTENDED RELEASE ORAL at 08:06

## 2020-06-27 RX ADMIN — ASPIRIN 81 MG: 81 TABLET, COATED ORAL at 08:06

## 2020-06-27 RX ADMIN — PANTOPRAZOLE SODIUM 40 MG: 40 TABLET, DELAYED RELEASE ORAL at 08:06

## 2020-06-27 RX ADMIN — TAMSULOSIN HYDROCHLORIDE 0.4 MG: 0.4 CAPSULE ORAL at 08:06

## 2020-06-27 NOTE — SUBJECTIVE & OBJECTIVE
Interval History:     Review of Systems   Constitutional: Negative for chills, diaphoresis, fatigue and fever.   HENT: Negative for congestion, postnasal drip, rhinorrhea, sinus pressure and sore throat.    Respiratory: Negative for cough, shortness of breath and wheezing.    Cardiovascular: Negative for chest pain, palpitations and leg swelling.   Gastrointestinal: Negative for abdominal pain, diarrhea, nausea and vomiting.   Genitourinary: Negative for dysuria and hematuria.   Musculoskeletal: Positive for arthralgias (left great toe). Negative for back pain and myalgias.   Skin: Positive for color change (left great toe) and wound (left great toe). Negative for pallor and rash.   Neurological: Negative for dizziness, syncope, weakness, light-headedness, numbness and headaches.   All other systems reviewed and are negative.    Objective:     Vital Signs (Most Recent):  Temp: 98.1 °F (36.7 °C) (06/27/20 1521)  Pulse: 88 (06/27/20 1521)  Resp: 18 (06/27/20 1521)  BP: 118/65 (06/27/20 1521)  SpO2: (!) 93 % (06/27/20 1521) Vital Signs (24h Range):  Temp:  [96.4 °F (35.8 °C)-98.7 °F (37.1 °C)] 98.1 °F (36.7 °C)  Pulse:  [78-95] 88  Resp:  [16-21] 18  SpO2:  [93 %-97 %] 93 %  BP: (108-143)/(60-76) 118/65     Weight: 90.1 kg (198 lb 10.2 oz)  Body mass index is 33.05 kg/m².    Intake/Output Summary (Last 24 hours) at 6/27/2020 1618  Last data filed at 6/26/2020 1920  Gross per 24 hour   Intake 600 ml   Output 450 ml   Net 150 ml      Physical Exam  Vitals signs and nursing note reviewed.   Constitutional:       General: He is not in acute distress.     Appearance: He is well-developed. He is not diaphoretic.   HENT:      Head: Normocephalic and atraumatic.   Eyes:      Conjunctiva/sclera: Conjunctivae normal.   Neck:      Musculoskeletal: Normal range of motion and neck supple.   Cardiovascular:      Rate and Rhythm: Normal rate and regular rhythm.      Pulses:           Dorsalis pedis pulses are 2+ on the right side and  detected w/ Doppler on the left side.        Posterior tibial pulses are 2+ on the right side and 2+ on the left side.      Heart sounds: S1 normal and S2 normal. No murmur.   Pulmonary:      Effort: Pulmonary effort is normal. No tachypnea, accessory muscle usage or respiratory distress.      Breath sounds: No wheezing, rhonchi or rales.   Abdominal:      General: Bowel sounds are normal. There is no distension.      Palpations: Abdomen is soft.      Tenderness: There is no abdominal tenderness.   Musculoskeletal: Normal range of motion.   Feet:      Left foot:      Skin integrity: Ulcer, skin breakdown, erythema and warmth present.      Comments: Left great toe black, pulse found with doppler to dorsal foot, other toes warm with no discoloration.  Pain to left great toe with decreased ROM.  Skin:     General: Skin is warm.      Capillary Refill: Capillary refill takes less than 2 seconds.      Findings: No rash.      Comments: See pictures below of left great toe gangrene.    Neurological:      Mental Status: He is alert and oriented to person, place, and time.   Psychiatric:         Behavior: Behavior normal.         Significant Labs: All pertinent labs within the past 24 hours have been reviewed.    Significant Imaging: I have reviewed all pertinent imaging results/findings within the past 24 hours.

## 2020-06-27 NOTE — SUBJECTIVE & OBJECTIVE
Past Medical History:   Diagnosis Date    Acute kidney injury     Arthritis     Asthma     Atrial fibrillation     Atrial fibrillation with RVR 8/7/2019    CHF (congestive heart failure)     Depression     Diabetes mellitus, type 2 Diagnosed in 2000    Elevated PSA     Hypertension     Sleep apnea        Past Surgical History:   Procedure Laterality Date    ABLATION OF ARRHYTHMOGENIC FOCUS FOR ATRIAL FIBRILLATION N/A 2/27/2020    Procedure: Ablation atrial fibrillation;  Surgeon: Gio Brown MD;  Location: [facility] EP LAB;  Service: Cardiology;  Laterality: N/A;  afib, DAMIEN (cx if SR), PVI, PITO, anes, MB, 3 Prep    CHOLECYSTECTOMY      SELECTIVE INJECTION OF ANESTHETIC AGENT AROUND LUMBAR SPINAL NERVE ROOT BY TRANSFORAMINAL APPROACH Left 6/11/2020    Procedure: BLOCK, SPINAL NERVE ROOT, LUMBAR, SELECTIVE, TRANSFORAMINAL APPROACH Left L4-5, L5-S1 TFESI with RN IV sedation;  Surgeon: Dillan Tsai MD;  Location: [facility] PAIN [facility]GT;  Service: Pain Management;  Laterality: Left;       Review of patient's allergies indicates:   Allergen Reactions    Protamine Hives     Urticaria, possible upper airway swelling       No current facility-administered medications on file prior to encounter.      Current Outpatient Medications on File Prior to Encounter   Medication Sig    ALCOHOL ANTISEPTIC PADS (ALCOHOL PREP PADS TOP)     allopurinoL (ZYLOPRIM) 100 MG tablet Take 1 tablet (100 mg total) by mouth once daily.    amoxicillin-clavulanate 875-125mg (AUGMENTIN) 875-125 mg per tablet Take 1 tablet by mouth every 12 (twelve) hours. for 10 days    aspirin (ECOTRIN) 81 MG EC tablet Take 1 tablet (81 mg total) by mouth once daily.    atorvastatin (LIPITOR) 40 MG tablet TAKE 1 TABLET EVERY EVENING    azaTHIOprine (IMURAN) 50 mg Tab Take 1 tablet (50 mg total) by mouth once daily.    baclofen (LIORESAL) 10 MG tablet Take 1 tablet (10 mg total) by mouth once daily.    blood sugar diagnostic (TRUE METRIX GLUCOSE TEST  STRIP) Strp Use with blood glucose meter kit to test blood sugar four times  daily    blood-glucose meter (TRUE METRIX AIR GLUCOSE METER) kit TEST FOUR TIMES DAILY BEFORE MEALS  AND EVERY NIGHT    colchicine (COLCRYS) 0.6 mg tablet Take 1 tablet (0.6 mg total) by mouth once daily.    diclofenac sodium (VOLTAREN) 1 % Gel Apply 2 g topically 4 (four) times daily.    diltiaZEM (CARDIZEM SR) 60 MG Cp12 Take 1 capsule (60 mg total) by mouth 2 (two) times daily.    ELIQUIS 5 mg Tab Take 1 tablet (5 mg total) by mouth 2 (two) times daily.    fluticasone-salmeterol diskus inhaler 250-50 mcg Inhale 1 puff into the lungs 2 (two) times daily. Controller    food supplemt, lactose-reduced (ENSURE) Liqd Take 118 mLs by mouth 3 (three) times daily with meals.    furosemide (LASIX) 40 MG tablet TAKE 2 TABLETS EVERY MORNING  AND TAKE 1 TABLET EVERY EVENING    gabapentin (NEURONTIN) 800 MG tablet Take 1 tablet (800 mg total) by mouth 3 (three) times daily.    glimepiride (AMARYL) 2 MG tablet TAKE 1 TABLET (2 MG TOTAL) BY MOUTH BEFORE BREAKFAST.    HYDROcodone-acetaminophen (NORCO) 5-325 mg per tablet Take 1 tablet by mouth every 6 (six) hours as needed for Pain.    inhalation spacing device Use as directed for inhalation.    insulin (LANTUS SOLOSTAR U-100 INSULIN) glargine 100 units/mL (3mL) SubQ pen INJECT 80 UNITS SUBCUTANEOUSLY EVERY EVENING    insulin aspart U-100 (NOVOLOG FLEXPEN U-100 INSULIN) 100 unit/mL (3 mL) InPn pen INJECT 20 UNITS SUBCUTANEOUSLY THREE TIMES DAILY WITH MEALS    insulin syringe-needle U-100 1 mL 31 gauge x 5/16 Syrg     ipratropium (ATROVENT) 0.02 % nebulizer solution USE 1 VIAL VIA NEBULIZER FOUR TIMES DAILY    isosorbide mononitrate (IMDUR) 60 MG 24 hr tablet Take 1 tablet (60 mg total) by mouth once daily.    ketorolac (TORADOL) 10 mg tablet Take 1 tablet (10 mg total) by mouth every 8 (eight) hours as needed for Pain.    lancets 32 gauge Misc 1 lancet by Misc.(Non-Drug; Combo Route)  "route 2 (two) times daily.    latanoprost 0.005 % ophthalmic solution INSTILL 1 DROP INTO BOTH EYES EVERY EVENING    lisinopril 10 MG tablet Take 1 tablet (10 mg total) by mouth once daily.    metFORMIN (GLUCOPHAGE-XR) 500 MG XR 24hr tablet Take 2 tablets (1,000 mg total) by mouth 2 (two) times daily with meals.    methylPREDNISolone (MEDROL DOSEPACK) 4 mg tablet Take as directed on the package.    metoprolol succinate (TOPROL-XL) 50 MG 24 hr tablet Take 1 tablet (50 mg total) by mouth once daily.    nitroGLYCERIN (NITROSTAT) 0.3 MG SL tablet PLACE 1 TABLET UNDER THE TONGUE EVERY 5 MINUTES AS NEEDED FOR CHEST PAIN, NO MORE THAN 3 TABLETS IN ONE DAY    OZEMPIC 1 mg/dose (2 mg/1.5 mL) PnIj     pantoprazole (PROTONIX) 40 MG tablet Take 1 tablet (40 mg total) by mouth once daily.    pen needle, diabetic (BD ULTRA-FINE SHORT PEN NEEDLE) 31 gauge x 5/16" Ndle Inject 1 each into the skin 3 (three) times daily.    predniSONE (DELTASONE) 5 MG tablet Take 1 tablet (5 mg total) by mouth 2 (two) times daily.    sertraline (ZOLOFT) 100 MG tablet Take 1 tablet (100 mg total) by mouth every evening.    sertraline (ZOLOFT) 50 MG tablet TAKE 1 TABLET ONE TIME DAILY    silver sulfADIAZINE 1% (SILVADENE) 1 % cream Apply topically 2 (two) times daily.    tamsulosin (FLOMAX) 0.4 mg Cap Take 1 capsule (0.4 mg total) by mouth once daily.    tiotropium (SPIRIVA WITH HANDIHALER) 18 mcg inhalation capsule Inhale 1 capsule (18 mcg total) into the lungs once daily. Controller    traZODone (DESYREL) 100 MG tablet Take 2 tablets (200 mg total) by mouth every evening.    VIOS AEROSOL DELIVERY SYSTEM Shefali USE AS DIRESTED     Family History     Problem Relation (Age of Onset)    Cataracts Mother, Father, Sister    Glaucoma Mother, Sister, Maternal Aunt    No Known Problems Brother, Daughter, Son        Tobacco Use    Smoking status: Never Smoker    Smokeless tobacco: Never Used   Substance and Sexual Activity    Alcohol use: No "     Comment: occ    Drug use: No    Sexual activity: Yes     Partners: Female     Review of Systems   Constitutional: Negative for chills, diaphoresis, fatigue and fever.   HENT: Negative for congestion, postnasal drip, rhinorrhea, sinus pressure and sore throat.    Respiratory: Negative for cough, shortness of breath and wheezing.    Cardiovascular: Negative for chest pain, palpitations and leg swelling.   Gastrointestinal: Negative for abdominal pain, diarrhea, nausea and vomiting.   Genitourinary: Negative for dysuria and hematuria.   Musculoskeletal: Positive for arthralgias (left great toe). Negative for back pain and myalgias.   Skin: Positive for color change (left great toe) and wound (left great toe). Negative for pallor and rash.   Neurological: Negative for dizziness, syncope, weakness, light-headedness, numbness and headaches.   All other systems reviewed and are negative.    Objective:     Vital Signs (Most Recent):  Temp: 97.1 °F (36.2 °C) (06/26/20 2010)  Pulse: 85 (06/26/20 2010)  Resp: 20 (06/26/20 2010)  BP: 128/65 (06/26/20 2010)  SpO2: 97 % (06/26/20 2010) Vital Signs (24h Range):  Temp:  [97.1 °F (36.2 °C)-97.9 °F (36.6 °C)] 97.1 °F (36.2 °C)  Pulse:  [85-90] 85  Resp:  [18-21] 20  SpO2:  [94 %-97 %] 97 %  BP: (116-134)/(60-74) 128/65     Weight: 90.1 kg (198 lb 10.2 oz)  Body mass index is 33.05 kg/m².    Physical Exam  Vitals signs and nursing note reviewed.   Constitutional:       General: He is not in acute distress.     Appearance: He is well-developed. He is not diaphoretic.   HENT:      Head: Normocephalic and atraumatic.   Eyes:      Conjunctiva/sclera: Conjunctivae normal.   Neck:      Musculoskeletal: Normal range of motion and neck supple.   Cardiovascular:      Rate and Rhythm: Normal rate and regular rhythm.      Pulses:           Dorsalis pedis pulses are 2+ on the right side and detected w/ Doppler on the left side.        Posterior tibial pulses are 2+ on the right side and 2+  on the left side.      Heart sounds: S1 normal and S2 normal. No murmur.   Pulmonary:      Effort: Pulmonary effort is normal. No tachypnea, accessory muscle usage or respiratory distress.      Breath sounds: No wheezing, rhonchi or rales.   Abdominal:      General: Bowel sounds are normal. There is no distension.      Palpations: Abdomen is soft.      Tenderness: There is no abdominal tenderness.   Musculoskeletal: Normal range of motion.   Feet:      Left foot:      Skin integrity: Ulcer, skin breakdown, erythema and warmth present.      Comments: Left great toe black, pulse found with doppler to dorsal foot, other toes warm with no discoloration.  Pain to left great toe with decreased ROM.  Skin:     General: Skin is warm.      Capillary Refill: Capillary refill takes less than 2 seconds.      Findings: No rash.      Comments: See pictures below of left great toe gangrene.    Neurological:      Mental Status: He is alert and oriented to person, place, and time.   Psychiatric:         Behavior: Behavior normal.                     Significant Labs:  Results for orders placed or performed during the hospital encounter of 06/26/20   Comprehensive metabolic panel   Result Value Ref Range    Sodium 141 136 - 145 mmol/L    Potassium 4.3 3.5 - 5.1 mmol/L    Chloride 104 95 - 110 mmol/L    CO2 29 23 - 29 mmol/L    Glucose 76 70 - 110 mg/dL    BUN, Bld 36 (H) 8 - 23 mg/dL    Creatinine 1.4 0.5 - 1.4 mg/dL    Calcium 9.4 8.7 - 10.5 mg/dL    Total Protein 8.0 6.0 - 8.4 g/dL    Albumin 2.7 (L) 3.5 - 5.2 g/dL    Total Bilirubin 0.2 0.1 - 1.0 mg/dL    Alkaline Phosphatase 64 55 - 135 U/L    AST 15 10 - 40 U/L    ALT 19 10 - 44 U/L    Anion Gap 8 8 - 16 mmol/L    eGFR if African American >60.0 >60 mL/min/1.73 m^2    eGFR if non  53.1 (A) >60 mL/min/1.73 m^2   Lactic acid, plasma #1   Result Value Ref Range    Lactate (Lactic Acid) 1.5 0.5 - 2.2 mmol/L   Urinalysis, Reflex to Urine Culture Urine, Clean Catch     Specimen: Urine   Result Value Ref Range    Specimen UA Urine, Clean Catch     Color, UA Yellow Yellow, Straw, Angela    Appearance, UA Clear Clear    pH, UA 6.0 5.0 - 8.0    Specific Gravity, UA <=1.005 (A) 1.005 - 1.030    Protein, UA Negative Negative    Glucose, UA Negative Negative    Ketones, UA Negative Negative    Bilirubin (UA) Negative Negative    Occult Blood UA Negative Negative    Nitrite, UA Negative Negative    Urobilinogen, UA Negative <2.0 EU/dL    Leukocytes, UA Negative Negative   COVID-19 Rapid Screening   Result Value Ref Range    SARS-CoV-2 RNA, Amplification, Qual Negative Negative   D dimer, quantitative   Result Value Ref Range    D-Dimer 0.58 (H) <0.50 mg/L FEU   CK   Result Value Ref Range    CPK 87 20 - 200 U/L   Protime-INR   Result Value Ref Range    Prothrombin Time 10.1 9.0 - 12.5 sec    INR 1.0 0.8 - 1.2   APTT   Result Value Ref Range    aPTT 27.4 21.0 - 32.0 sec   Troponin I   Result Value Ref Range    Troponin I 0.065 (H) 0.000 - 0.026 ng/mL     *Note: Due to a large number of results and/or encounters for the requested time period, some results have not been displayed. A complete set of results can be found in Results Review.      All pertinent labs within the past 24 hours have been reviewed.    Significant Imaging:  Imaging Results          CTA Chest Non-Coronary (PE Study) (Final result)  Result time 06/26/20 19:35:00    Final result by DARELL Bose Sr., MD (06/26/20 19:35:00)                 Impression:      1. There is no pulmonary embolus.  2. There is a mild amount of atelectasis in the inferior aspect of the right middle lobe. There is a mild amount of atelectasis in the inferior aspect of the lingula.  3. There is a streaky opacity in the right lower lobe.  This is characteristic of subsegmental atelectasis or scarring.  4. There is a 10 mm oval shaped sclerotic area in the T10 vertebral body.  This is characteristic of a bone island.  All CT scans at this facility  use dose modulation, iterative reconstruction, and/or weight base dosing when appropriate to reduce radiation dose when appropriate to reduce radiation dose to as low as reasonably achievable.      Electronically signed by: Vimal Bose MD  Date:    06/26/2020  Time:    19:35             Narrative:    EXAMINATION:  CTA CHEST NON CORONARY    CLINICAL HISTORY:  PE suspected, intermediate prob, positive D-dimer;    TECHNIQUE:  Standard chest CT protocol was performed with IV contrast and 3D MIP reformats.  100 mL of Omnipaque 350 contrast material was used for this examination.    COMPARISON:  A chest x-ray performed on 06/26/2020.    FINDINGS:  The size of heart is within normal limits.  There is a mild amount of atherosclerosis.  There is no pulmonary embolus.  There is a mild amount of atelectasis in the inferior aspect of the right middle lobe.  There is a streaky opacity in the right lower lobe.  There is a mild amount of atelectasis in the inferior aspect of the lingula.  There is no pneumothorax or pleural effusion.  There is a 10 mm oval shaped sclerotic area in the T10 vertebral body.                               X-Ray Chest 1 View (Final result)  Result time 06/26/20 19:29:15    Final result by DARELL Bose Sr., MD (06/26/20 19:29:15)                 Impression:      1. There is a persistent mild amount of haziness in the base of the right lung.  This is characteristic of atelectasis or pneumonia.  2. The size of the heart is prominent.  This may be secondary to magnification.  .      Electronically signed by: Vimal Bose MD  Date:    06/26/2020  Time:    19:29             Narrative:    EXAMINATION:  XR CHEST 1 VIEW    CLINICAL HISTORY:  Shortness of breath    COMPARISON:  06/23/2020    FINDINGS:  The size of the heart is prominent.  There is a persistent mild amount of haziness in the base of the right lung.  The left lung is clear.  There is no pneumothorax.  The costophrenic angles are sharp.                                I have reviewed all pertinent imaging results/findings within the past 24 hours.     EKG: (personally reviewed)  Sinus rhythm with PACs, LAFB, no acute ischemic ST-T changes.  No significant change from previous tracings.

## 2020-06-27 NOTE — PROGRESS NOTES
[facility] Hospital Medicine  Progress Note    Patient Name: Crow Green  MRN: [id number]  Patient Class: IP- Inpatient   Admission Date: 6/26/2020  Length of Stay: 1 days  Attending Physician: Anthony Hester, *  Primary Care Provider: Mateo Lambert DO        Subjective:     Principal Problem:Gangrene of left foot        HPI:  Mr. Green is a 64 yo male with a PMHx of DM II, HTN, HLD, SANDRITA, obesity, PAF on Eliquis, nonischemic CMPY with LVEF of 45%, nonobstructive CAD, and asthma.  He presented to the ED with c/o pain to left great toe that has progressively worsened over the past 1 week.  He was seen by Dr. Veras (Podiatry) last week and prescribed Augmentin and a foot boot.  Patient reports being compliant with antibiotics, but has seen no improvement in left great toe wound.  Associated toe swelling, redness, warmth, black color change, and chills.  The symptoms are aggravated by movement and activity; no alleviating factors.  Denies any drainage, foul odor, weakness, numbness/tingling, foot or ankle pain/swelling, CP, SOB, ABD pain, N/V/D, back pain, HA, lightheadedness, dizziness, syncope, fever or chills.  ED evaluation of left great toe was consistent with gangrene.  Labs showed WBC 18.95, 0% bands, and normal lactic.  Blood cultures were drawn in the ED and IV vanc given.  Hospital Medicine was contacted for admission.       Overview/Hospital Course:  65 y/p aam admitted with a Dx of Left foot gangrene  And  Left foot cellulitis . He was started on Broad spectrum IVAB . Podiatrist was consulted . He had a recent arterial and  Venous LE US which did not show any acute finding  . The Blood cx are NGTD.    Interval History:     Review of Systems   Constitutional: Negative for chills, diaphoresis, fatigue and fever.   HENT: Negative for congestion, postnasal drip, rhinorrhea, sinus pressure and sore throat.    Respiratory: Negative for cough, shortness of breath and  wheezing.    Cardiovascular: Negative for chest pain, palpitations and leg swelling.   Gastrointestinal: Negative for abdominal pain, diarrhea, nausea and vomiting.   Genitourinary: Negative for dysuria and hematuria.   Musculoskeletal: Positive for arthralgias (left great toe). Negative for back pain and myalgias.   Skin: Positive for color change (left great toe) and wound (left great toe). Negative for pallor and rash.   Neurological: Negative for dizziness, syncope, weakness, light-headedness, numbness and headaches.   All other systems reviewed and are negative.    Objective:     Vital Signs (Most Recent):  Temp: 98.1 °F (36.7 °C) (06/27/20 1521)  Pulse: 88 (06/27/20 1521)  Resp: 18 (06/27/20 1521)  BP: 118/65 (06/27/20 1521)  SpO2: (!) 93 % (06/27/20 1521) Vital Signs (24h Range):  Temp:  [96.4 °F (35.8 °C)-98.7 °F (37.1 °C)] 98.1 °F (36.7 °C)  Pulse:  [78-95] 88  Resp:  [16-21] 18  SpO2:  [93 %-97 %] 93 %  BP: (108-143)/(60-76) 118/65     Weight: 90.1 kg (198 lb 10.2 oz)  Body mass index is 33.05 kg/m².    Intake/Output Summary (Last 24 hours) at 6/27/2020 1618  Last data filed at 6/26/2020 1920  Gross per 24 hour   Intake 600 ml   Output 450 ml   Net 150 ml      Physical Exam  Vitals signs and nursing note reviewed.   Constitutional:       General: He is not in acute distress.     Appearance: He is well-developed. He is not diaphoretic.   HENT:      Head: Normocephalic and atraumatic.   Eyes:      Conjunctiva/sclera: Conjunctivae normal.   Neck:      Musculoskeletal: Normal range of motion and neck supple.   Cardiovascular:      Rate and Rhythm: Normal rate and regular rhythm.      Pulses:           Dorsalis pedis pulses are 2+ on the right side and detected w/ Doppler on the left side.        Posterior tibial pulses are 2+ on the right side and 2+ on the left side.      Heart sounds: S1 normal and S2 normal. No murmur.   Pulmonary:      Effort: Pulmonary effort is normal. No tachypnea, accessory muscle  usage or respiratory distress.      Breath sounds: No wheezing, rhonchi or rales.   Abdominal:      General: Bowel sounds are normal. There is no distension.      Palpations: Abdomen is soft.      Tenderness: There is no abdominal tenderness.   Musculoskeletal: Normal range of motion.   Feet:      Left foot:      Skin integrity: Ulcer, skin breakdown, erythema and warmth present.      Comments: Left great toe black, pulse found with doppler to dorsal foot, other toes warm with no discoloration.  Pain to left great toe with decreased ROM.  Skin:     General: Skin is warm.      Capillary Refill: Capillary refill takes less than 2 seconds.      Findings: No rash.      Comments: See pictures below of left great toe gangrene.    Neurological:      Mental Status: He is alert and oriented to person, place, and time.   Psychiatric:         Behavior: Behavior normal.         Significant Labs: All pertinent labs within the past 24 hours have been reviewed.    Significant Imaging: I have reviewed all pertinent imaging results/findings within the past 24 hours.      Assessment/Plan:      * Gangrene of left foot  - Continue empiric IV vanc.  Pharmacy consult for dosing.  - Analgesics as needed.  - Podiatry consult.  Will keep NPO after MN for probable surgical intervention.    Chronic troponin elevation  - Troponin stable at baseline.  No CP or anginal equivalent.  EKG unrevealing.  - Patient with known nonobstructive CAD per [facility] in 2016.  - Trend serial cardiac enzymes.  - Continue ASA, BB, and statin.    Diabetic ulcer of toe of left foot associated with type 2 diabetes mellitus, with necrosis of muscle  - Cont IVAB   -Podiatrist consulted     Stage 3 chronic kidney disease  - Creatinine stable at baseline.  Avoid nephrotoxic agents.  Follow daily labs.    PAF (paroxysmal atrial fibrillation)  - Remains in sinus rhythm.  Monitor telemetry.  - Continue home Toprol XL and diltiazem.   - Will hold Eliquis for probable surgery.   Start heparin drip per nomogram for AC.    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  - Continue basal insulin at 40 units nightly.  - AccuChecks with moderate dose SSI.  - Diabetic diet.  - Recent HbA1c of 7.7.    Chronic combined systolic and diastolic HFrEF of 45%  - Clinically compensated on admission.  - Continue home diuretics, BB, and ACEi.  - Strict I&O's, daily weights, Na/fluid restriction.     Hypertension associated with diabetes  - BP stable.  Continue home lisinopril, Imdur, Toprol XL, diltiazem, and Lasix.      VTE Risk Mitigation (From admission, onward)         Ordered     heparin 25,000 units in dextrose 5% 250 mL (100 units/mL) infusion LOW INTENSITY nomogram - [facility]  Continuous     Question:  Heparin Infusion Adjustment (DO NOT MODIFY ANSWER)  Answer:  \\[email or web address]\epic\Images\Pharmacy\HeparinInfusions\heparin LOW INTENSITY nomogram for [facility] [id number]    06/26/20 2217     IP VTE HIGH RISK PATIENT  Once      06/26/20 2324     heparin 25,000 units in dextrose 5% (100 units/ml) IV bolus from bag - ADDITIONAL PRN BOLUS - 60 units/kg  As needed (PRN)     Question:  Heparin Infusion Adjustment (DO NOT MODIFY ANSWER)  Answer:  [email or web address]\epic\Images\Pharmacy\HeparinInfusions\heparin LOW INTENSITY nomogram for [facility] [id number].pdf    06/26/20 2217     heparin 25,000 units in dextrose 5% (100 units/ml) IV bolus from bag - ADDITIONAL PRN BOLUS - 30 units/kg  As needed (PRN)     Question:  Heparin Infusion Adjustment (DO NOT MODIFY ANSWER)  Answer:  [email or web address]\epic\Images\Pharmacy\HeparinInfusions\heparin LOW INTENSITY nomogram for [facility] [id number].pdf    06/26/20 2217     Reason for No Pharmacological VTE Prophylaxis  Once     Question:  Reasons:  Answer:  Already adequately anticoagulated on oral Anticoagulants    06/26/20 2217     Place sequential  compression device  Until discontinued      06/26/20 3652                      Anthony Hester MD  Department of Hospital Medicine   [facility]

## 2020-06-27 NOTE — ASSESSMENT & PLAN NOTE
- Clinically compensated on admission.  - Will hold off on IV hydration to prevent volume overload.  - Continue home diuretics, BB, and ACEi.  - Strict I&O's, daily weights, Na/fluid restriction.

## 2020-06-27 NOTE — ASSESSMENT & PLAN NOTE
- Hold metformin, glimepiride, and Ozempic during hospital stay.  - Continue basal insulin at 40 units nightly.  - AccuChecks with moderate dose SSI.  - Diabetic diet.  - Recent HbA1c of 7.7.

## 2020-06-27 NOTE — NURSING
Patient admitted to room 218. AAO. Admit performed. Patient oriented to room, call bell, meal times, visiting hours, procedures, etc. Patient expressed understanding. Patient advised on safety and fall prevention. Bed alarm on. Bed low. SR x 2 up. See admission for additional details.

## 2020-06-27 NOTE — HOSPITAL COURSE
65 y/p aam admitted with a Dx of Left foot gangrene  And  Left foot cellulitis . He was started on Broad spectrum IVAB . Podiatrist was consulted . He had a recent arterial and  Venous LE US which did not show any acute finding .The Blood cx are NGTD.  6/28 Pt was seen and examined at bedside . He is complaining of severe pain  Of the left toe . Podiatrist  Plan for surgical resection of great toe with possible graft application/WoundVac application with Dr. Barb Veras on Monday . The blood cx are NGTD   6/29 He is s/p Left toe amputation today . The blood cx are NGTD . He is on cefepime and vanc . We will de escalate  Tomorrow am .  He report the pain is better . There was no acute event overnight .  6/30 He is s/p Left Hallux Amputation . The wound cx is (+) for Staph . He will be taken to the OR tomorrow  For closure .  The BP has been borderline low .  7/1 He is s/p wound closure today . He cont with left leg pain .  There was no acute event overnight   7/2 PT/OT recommend HH . Pt state the pain is improving . The DOAC was resume last night .   7/3 Pt was seen and examined at bedside . He was determined  To be suitable for d/c   -Pt insulin  Dosage ere decrease . He was advised to f/u his pcp  -He will F/U  Vascular surgery and Podiatrist  -He will d/c on doxycycline   -PT/OT rec  HH

## 2020-06-27 NOTE — PROGRESS NOTES
Pharmacokinetic Assessment Follow Up: IV Vancomycin    Vancomycin serum concentration assessment(s):  ~18 hour random level @ 0815 this AM = 11.5 mcg/ml    Vancomycin Regimen Plan:  We re-dosed with 15 mg/kg (1250 mg) around 1245 today  Will check random level tomorrow 6/28 with AM labs  Will re-dose for levels < 20 mcg/ml  Goal level: 10-20 mcg/ml    Drug levels (last 3 results):  Recent Labs   Lab Result Units 06/27/20  0815   Vancomycin, Random ug/mL 11.5       Pharmacy will continue to follow and monitor vancomycin.    Please contact pharmacy at extension [phone] for questions regarding this assessment.    Thank you for the consult,   Katherine McArdle, Pharm.D. 6/27/2020 4:23 PM      Patient brief summary:  Crow Green is a 65 y.o. male initiated on antimicrobial therapy with IV Vancomycin for treatment of foot gangrene    The patient's current regimen is pulse dosing PRN based on random levels (due to history of JOSE MARIA)    Drug Allergies:   Review of patient's allergies indicates:   Allergen Reactions    Protamine Hives     Urticaria, possible upper airway swelling       Actual Body Weight:   90.1 kg    Renal Function:   Estimated Creatinine Clearance: 70.9 mL/min (based on SCr of 1.1 mg/dL). -- down from 1.4      CBC (last 72 hours):  Recent Labs   Lab Result Units 06/26/20  1232 06/27/20  0439   WBC K/uL 18.95* 14.19*   Hemoglobin g/dL 9.8* 9.7*   Hematocrit % 31.7* 31.5*   Platelets K/uL 265 244   Gran% % 80.9* 73.0   Lymph% % 11.5* 19.3   Mono% % 6.6 6.1   Eosinophil% % 0.2 1.0   Basophil% % 0.1 0.1   Differential Method  Automated Automated       Metabolic Panel (last 72 hours):  Recent Labs   Lab Result Units 06/26/20  1214 06/26/20  1232 06/26/20  1313 06/26/20  1743 06/27/20  0439   Sodium mmol/L  --  141 141  --  139   Potassium mmol/L  --  4.2 4.3  --  3.7   Chloride mmol/L  --  103 104  --  99   CO2 mmol/L  --  29 29  --  28   Glucose mg/dL  --  96 76  --  127*   Glucose, UA   --   --   --   Negative  --    BUN, Bld mg/dL  --  37* 36*  --  26*   Creatinine mg/dL  --  1.4 1.4  --  1.1   Creatinine, Random Ur mg/dL 127.0  --   --   --   --    Albumin g/dL  --  2.7* 2.7*  --   --    Total Bilirubin mg/dL  --  0.1 0.2  --   --    Alkaline Phosphatase U/L  --  62 64  --   --    AST U/L  --  14 15  --   --    ALT U/L  --  19 19  --   --        Vancomycin Administrations:  vancomycin given in the last 96 hours                   vancomycin 1.25 g in dextrose 5% 250 mL IVPB (ready to mix) (mg) 1,250 mg New Bag 06/27/20 1145    vancomycin 500 mg in dextrose 5 % 100 mL IVPB (ready to mix system) (mg) 500 mg New Bag 06/26/20 1756    vancomycin 750 mg in dextrose 5 % 250 mL IVPB (ready to mix system) (mg) 750 mg New Bag 06/26/20 1645    vancomycin in dextrose 5 % 1 gram/250 mL IVPB 1,000 mg (mg) 1,000 mg New Bag 06/26/20 1445                Microbiologic Results:  Microbiology Results (last 7 days)     Procedure Component Value Units Date/Time    Blood culture x two cultures. Draw prior to antibiotics. [[id number]] Collected: 06/26/20 1317    Order Status: Completed Specimen: Blood from Peripheral, Hand, Right Updated: 06/27/20 0715     Blood Culture, Routine No Growth to date    Narrative:      Aerobic and anaerobic    Blood culture x two cultures. Draw prior to antibiotics. [[id number]] Collected: 06/26/20 1313    Order Status: Completed Specimen: Blood from Peripheral, Antecubital, Left Updated: 06/27/20 0715     Blood Culture, Routine No Growth to date    Narrative:      Aerobic and anaerobic

## 2020-06-27 NOTE — ASSESSMENT & PLAN NOTE
- Troponin stable at baseline.  No CP or anginal equivalent.  EKG unrevealing.  - Patient with known nonobstructive CAD per [facility] in 2016.  - Trend serial cardiac enzymes.  - Continue ASA, BB, and statin.

## 2020-06-27 NOTE — PROGRESS NOTES
Pharmacokinetic Initial Assessment: IV Vancomycin    Assessment/Plan:    Initiate intravenous vancomycin with loading dose of 2250 mg once with subsequent doses when random concentrations are less than 20 mcg/mL. Desired empiric serum trough concentration is 10 to 20 mcg/mL.     Draw vancomycin random level on 6/27 at 0800.    Pharmacy will continue to follow and monitor vancomycin.      Please contact pharmacy at extension [phone] with any questions regarding this assessment.     Thank you for the consult,   Cristóbal Luther, PharmD 6/26/2020 7:04 PM         Patient brief summary:  Crow Green is a 65 y.o. male initiated on antimicrobial therapy with IV Vancomycin for treatment of suspected skin & soft tissue infection.    Drug Allergies:   Review of patient's allergies indicates:   Allergen Reactions    Protamine Hives     Urticaria, possible upper airway swelling       Actual Body Weight:   90.1 kg    Renal Function:   Estimated Creatinine Clearance: 55.7 mL/min (based on SCr of 1.4 mg/dL).,     Dialysis Method (if applicable):  N/A    CBC (last 72 hours):  Recent Labs   Lab Result Units 06/26/20  1232   WBC K/uL 18.95*   Hemoglobin g/dL 9.8*   Hematocrit % 31.7*   Platelets K/uL 265   Gran% % 80.9*   Lymph% % 11.5*   Mono% % 6.6   Eosinophil% % 0.2   Basophil% % 0.1   Differential Method  Automated       Metabolic Panel (last 72 hours):  Recent Labs   Lab Result Units 06/26/20  1232 06/26/20  1313 06/26/20  1743   Sodium mmol/L 141 141  --    Potassium mmol/L 4.2 4.3  --    Chloride mmol/L 103 104  --    CO2 mmol/L 29 29  --    Glucose mg/dL 96 76  --    Glucose, UA   --   --  Negative   BUN, Bld mg/dL 37* 36*  --    Creatinine mg/dL 1.4 1.4  --    Albumin g/dL 2.7* 2.7*  --    Total Bilirubin mg/dL 0.1 0.2  --    Alkaline Phosphatase U/L 62 64  --    AST U/L 14 15  --    ALT U/L 19 19  --        Drug levels (last 3 results):  No results for input(s): VANCOMYCINRA, VANCOMYCINPE, VANCOMYCINTR in the last 72  hours.    Microbiologic Results:  Microbiology Results (last 7 days)       Procedure Component Value Units Date/Time    Blood culture x two cultures. Draw prior to antibiotics. [[id number]] Collected: 06/26/20 1313    Order Status: Sent Specimen: Blood from Peripheral, Antecubital, Left Updated: 06/26/20 0662    Blood culture x two cultures. Draw prior to antibiotics. [[id number]] Collected: 06/26/20 1317    Order Status: Sent Specimen: Blood from Peripheral, Hand, Right Updated: 06/26/20 1840

## 2020-06-27 NOTE — HPI
Mr. Green is a 66 yo male with a PMHx of DM II, HTN, HLD, SANDRITA, obesity, PAF on Eliquis, nonischemic CMPY with LVEF of 45%, nonobstructive CAD, and asthma.  He presented to the ED with c/o pain to left great toe that has progressively worsened over the past 1 week.  He was seen by Dr. Veras (Podiatry) last week and prescribed Augmentin and a foot boot.  Patient reports being compliant with antibiotics, but has seen no improvement in left great toe wound.  Associated toe swelling, redness, warmth, black color change, and chills.  The symptoms are aggravated by movement and activity; no alleviating factors.  Denies any drainage, foul odor, weakness, numbness/tingling, foot or ankle pain/swelling, CP, SOB, ABD pain, N/V/D, back pain, HA, lightheadedness, dizziness, syncope, fever or chills.  ED evaluation of left great toe was consistent with gangrene.  Labs showed WBC 18.95, 0% bands, and normal lactic.  Blood cultures were drawn in the ED and IV vanc given.  Hospital Medicine was contacted for admission.

## 2020-06-27 NOTE — PLAN OF CARE
Patient states he is current with [facility] who sends a nurse to fill his medication box.  Preference obtained.  Referral sent via [facility].     06/27/20 3274   Post-Acute Status   Post-Acute Authorization [facility]   Home Health Status Referrals Sent

## 2020-06-27 NOTE — ASSESSMENT & PLAN NOTE
- Clinically compensated on admission.  - Continue home diuretics, BB, and ACEi.  - Strict I&O's, daily weights, Na/fluid restriction.

## 2020-06-27 NOTE — PLAN OF CARE
Patient AAOx4. VSS.  Patient remained afebrile throughout the shift.  Heart rate closely monitored   Patient NSR on monitor.  Patient remained free of falls this shift.  Plan of care reviewed.  Heparin gtt infusing to PIV, will cont to monitor  Patient verbalized understanding.  Patient moving/turning independently  Frequent weight shifting encouraged.  Bed low, siderails up x2, wheels locked, call light in reach.  Patient instructed to call for assistance.  Hourly rounding completed.  Will continue to monitor.

## 2020-06-27 NOTE — H&P
[facility] Hospital Medicine  History & Physical    Patient Name: Crow Green  MRN: [id number]  Admission Date: 6/26/2020  Attending Physician: Car Pineda MD   Primary Care Provider: Mateo Lambert DO         Patient information was obtained from patient, past medical records and ER records.     Subjective:     Principal Problem:Gangrene of left foot    Chief Complaint:   Chief Complaint   Patient presents with    Toe Pain     left great toe pain, on atb        HPI: Mr. Green is a 64 yo male with a PMHx of DM II, HTN, HLD, SANDRITA, obesity, PAF on Eliquis, nonischemic CMPY with LVEF of 45%, nonobstructive CAD, and asthma.  He presented to the ED with c/o pain to left great toe that has progressively worsened over the past 1 week.  He was seen by Dr. Veras (Podiatry) last week and prescribed Augmentin and a foot boot.  Patient reports being compliant with antibiotics, but has seen no improvement in left great toe wound.  Associated toe swelling, redness, warmth, black color change, and chills.  The symptoms are aggravated by movement and activity; no alleviating factors.  Denies any drainage, foul odor, weakness, numbness/tingling, foot or ankle pain/swelling, CP, SOB, ABD pain, N/V/D, back pain, HA, lightheadedness, dizziness, syncope, fever or chills.  ED evaluation of left great toe was consistent with gangrene.  Labs showed WBC 18.95, 0% bands, and normal lactic.  Blood cultures were drawn in the ED and IV vanc given.  Hospital Medicine was contacted for admission.       Past Medical History:   Diagnosis Date    Acute kidney injury     Arthritis     Asthma     Atrial fibrillation     Atrial fibrillation with RVR 8/7/2019    CHF (congestive heart failure)     Depression     Diabetes mellitus, type 2 Diagnosed in 2000    Elevated PSA     Hypertension     Sleep apnea        Past Surgical History:   Procedure Laterality Date    ABLATION OF ARRHYTHMOGENIC FOCUS FOR ATRIAL  FIBRILLATION N/A 2/27/2020    Procedure: Ablation atrial fibrillation;  Surgeon: Gio Brown MD;  Location: [facility] EP LAB;  Service: Cardiology;  Laterality: N/A;  afib, DAMIEN (cx if SR), PVI, PITO, anes, MB, 3 Prep    CHOLECYSTECTOMY      SELECTIVE INJECTION OF ANESTHETIC AGENT AROUND LUMBAR SPINAL NERVE ROOT BY TRANSFORAMINAL APPROACH Left 6/11/2020    Procedure: BLOCK, SPINAL NERVE ROOT, LUMBAR, SELECTIVE, TRANSFORAMINAL APPROACH Left L4-5, L5-S1 TFESI with RN IV sedation;  Surgeon: Dillan Tsai MD;  Location: [facility] PAIN MGT;  Service: Pain Management;  Laterality: Left;       Review of patient's allergies indicates:   Allergen Reactions    Protamine Hives     Urticaria, possible upper airway swelling       No current facility-administered medications on file prior to encounter.      Current Outpatient Medications on File Prior to Encounter   Medication Sig    ALCOHOL ANTISEPTIC PADS (ALCOHOL PREP PADS TOP)     allopurinoL (ZYLOPRIM) 100 MG tablet Take 1 tablet (100 mg total) by mouth once daily.    amoxicillin-clavulanate 875-125mg (AUGMENTIN) 875-125 mg per tablet Take 1 tablet by mouth every 12 (twelve) hours. for 10 days    aspirin (ECOTRIN) 81 MG EC tablet Take 1 tablet (81 mg total) by mouth once daily.    atorvastatin (LIPITOR) 40 MG tablet TAKE 1 TABLET EVERY EVENING    azaTHIOprine (IMURAN) 50 mg Tab Take 1 tablet (50 mg total) by mouth once daily.    baclofen (LIORESAL) 10 MG tablet Take 1 tablet (10 mg total) by mouth once daily.    blood sugar diagnostic (TRUE METRIX GLUCOSE TEST STRIP) Strp Use with blood glucose meter kit to test blood sugar four times  daily    blood-glucose meter (TRUE METRIX AIR GLUCOSE METER) kit TEST FOUR TIMES DAILY BEFORE MEALS  AND EVERY NIGHT    colchicine (COLCRYS) 0.6 mg tablet Take 1 tablet (0.6 mg total) by mouth once daily.    diclofenac sodium (VOLTAREN) 1 % Gel Apply 2 g topically 4 (four) times daily.    diltiaZEM (CARDIZEM SR) 60 MG Cp12 Take 1  capsule (60 mg total) by mouth 2 (two) times daily.    ELIQUIS 5 mg Tab Take 1 tablet (5 mg total) by mouth 2 (two) times daily.    fluticasone-salmeterol diskus inhaler 250-50 mcg Inhale 1 puff into the lungs 2 (two) times daily. Controller    food supplemt, lactose-reduced (ENSURE) Liqd Take 118 mLs by mouth 3 (three) times daily with meals.    furosemide (LASIX) 40 MG tablet TAKE 2 TABLETS EVERY MORNING  AND TAKE 1 TABLET EVERY EVENING    gabapentin (NEURONTIN) 800 MG tablet Take 1 tablet (800 mg total) by mouth 3 (three) times daily.    glimepiride (AMARYL) 2 MG tablet TAKE 1 TABLET (2 MG TOTAL) BY MOUTH BEFORE BREAKFAST.    HYDROcodone-acetaminophen (NORCO) 5-325 mg per tablet Take 1 tablet by mouth every 6 (six) hours as needed for Pain.    inhalation spacing device Use as directed for inhalation.    insulin (LANTUS SOLOSTAR U-100 INSULIN) glargine 100 units/mL (3mL) SubQ pen INJECT 80 UNITS SUBCUTANEOUSLY EVERY EVENING    insulin aspart U-100 (NOVOLOG FLEXPEN U-100 INSULIN) 100 unit/mL (3 mL) InPn pen INJECT 20 UNITS SUBCUTANEOUSLY THREE TIMES DAILY WITH MEALS    insulin syringe-needle U-100 1 mL 31 gauge x 5/16 Syrg     ipratropium (ATROVENT) 0.02 % nebulizer solution USE 1 VIAL VIA NEBULIZER FOUR TIMES DAILY    isosorbide mononitrate (IMDUR) 60 MG 24 hr tablet Take 1 tablet (60 mg total) by mouth once daily.    ketorolac (TORADOL) 10 mg tablet Take 1 tablet (10 mg total) by mouth every 8 (eight) hours as needed for Pain.    lancets 32 gauge Misc 1 lancet by Misc.(Non-Drug; Combo Route) route 2 (two) times daily.    latanoprost 0.005 % ophthalmic solution INSTILL 1 DROP INTO BOTH EYES EVERY EVENING    lisinopril 10 MG tablet Take 1 tablet (10 mg total) by mouth once daily.    metFORMIN (GLUCOPHAGE-XR) 500 MG XR 24hr tablet Take 2 tablets (1,000 mg total) by mouth 2 (two) times daily with meals.    methylPREDNISolone (MEDROL DOSEPACK) 4 mg tablet Take as directed on the package.     "metoprolol succinate (TOPROL-XL) 50 MG 24 hr tablet Take 1 tablet (50 mg total) by mouth once daily.    nitroGLYCERIN (NITROSTAT) 0.3 MG SL tablet PLACE 1 TABLET UNDER THE TONGUE EVERY 5 MINUTES AS NEEDED FOR CHEST PAIN, NO MORE THAN 3 TABLETS IN ONE DAY    OZEMPIC 1 mg/dose (2 mg/1.5 mL) PnIj     pantoprazole (PROTONIX) 40 MG tablet Take 1 tablet (40 mg total) by mouth once daily.    pen needle, diabetic (BD ULTRA-FINE SHORT PEN NEEDLE) 31 gauge x 5/16" Ndle Inject 1 each into the skin 3 (three) times daily.    predniSONE (DELTASONE) 5 MG tablet Take 1 tablet (5 mg total) by mouth 2 (two) times daily.    sertraline (ZOLOFT) 100 MG tablet Take 1 tablet (100 mg total) by mouth every evening.    sertraline (ZOLOFT) 50 MG tablet TAKE 1 TABLET ONE TIME DAILY    silver sulfADIAZINE 1% (SILVADENE) 1 % cream Apply topically 2 (two) times daily.    tamsulosin (FLOMAX) 0.4 mg Cap Take 1 capsule (0.4 mg total) by mouth once daily.    tiotropium (SPIRIVA WITH HANDIHALER) 18 mcg inhalation capsule Inhale 1 capsule (18 mcg total) into the lungs once daily. Controller    traZODone (DESYREL) 100 MG tablet Take 2 tablets (200 mg total) by mouth every evening.    VIOS AEROSOL DELIVERY SYSTEM Shefali USE AS DIRESTED     Family History     Problem Relation (Age of Onset)    Cataracts Mother, Father, Sister    Glaucoma Mother, Sister, Maternal Aunt    No Known Problems Brother, Daughter, Son        Tobacco Use    Smoking status: Never Smoker    Smokeless tobacco: Never Used   Substance and Sexual Activity    Alcohol use: No     Comment: occ    Drug use: No    Sexual activity: Yes     Partners: Female     Review of Systems   Constitutional: Negative for chills, diaphoresis, fatigue and fever.   HENT: Negative for congestion, postnasal drip, rhinorrhea, sinus pressure and sore throat.    Respiratory: Negative for cough, shortness of breath and wheezing.    Cardiovascular: Negative for chest pain, palpitations and leg " swelling.   Gastrointestinal: Negative for abdominal pain, diarrhea, nausea and vomiting.   Genitourinary: Negative for dysuria and hematuria.   Musculoskeletal: Positive for arthralgias (left great toe). Negative for back pain and myalgias.   Skin: Positive for color change (left great toe) and wound (left great toe). Negative for pallor and rash.   Neurological: Negative for dizziness, syncope, weakness, light-headedness, numbness and headaches.   All other systems reviewed and are negative.    Objective:     Vital Signs (Most Recent):  Temp: 97.1 °F (36.2 °C) (06/26/20 2010)  Pulse: 85 (06/26/20 2010)  Resp: 20 (06/26/20 2010)  BP: 128/65 (06/26/20 2010)  SpO2: 97 % (06/26/20 2010) Vital Signs (24h Range):  Temp:  [97.1 °F (36.2 °C)-97.9 °F (36.6 °C)] 97.1 °F (36.2 °C)  Pulse:  [85-90] 85  Resp:  [18-21] 20  SpO2:  [94 %-97 %] 97 %  BP: (116-134)/(60-74) 128/65     Weight: 90.1 kg (198 lb 10.2 oz)  Body mass index is 33.05 kg/m².    Physical Exam  Vitals signs and nursing note reviewed.   Constitutional:       General: He is not in acute distress.     Appearance: He is well-developed. He is not diaphoretic.   HENT:      Head: Normocephalic and atraumatic.   Eyes:      Conjunctiva/sclera: Conjunctivae normal.   Neck:      Musculoskeletal: Normal range of motion and neck supple.   Cardiovascular:      Rate and Rhythm: Normal rate and regular rhythm.      Pulses:           Dorsalis pedis pulses are 2+ on the right side and detected w/ Doppler on the left side.        Posterior tibial pulses are 2+ on the right side and 2+ on the left side.      Heart sounds: S1 normal and S2 normal. No murmur.   Pulmonary:      Effort: Pulmonary effort is normal. No tachypnea, accessory muscle usage or respiratory distress.      Breath sounds: No wheezing, rhonchi or rales.   Abdominal:      General: Bowel sounds are normal. There is no distension.      Palpations: Abdomen is soft.      Tenderness: There is no abdominal tenderness.    Musculoskeletal: Normal range of motion.   Feet:      Left foot:      Skin integrity: Ulcer, skin breakdown, erythema and warmth present.      Comments: Left great toe black, pulse found with doppler to dorsal foot, other toes warm with no discoloration.  Pain to left great toe with decreased ROM.  Skin:     General: Skin is warm.      Capillary Refill: Capillary refill takes less than 2 seconds.      Findings: No rash.      Comments: See pictures below of left great toe gangrene.    Neurological:      Mental Status: He is alert and oriented to person, place, and time.   Psychiatric:         Behavior: Behavior normal.                     Significant Labs:  Results for orders placed or performed during the hospital encounter of 06/26/20   Comprehensive metabolic panel   Result Value Ref Range    Sodium 141 136 - 145 mmol/L    Potassium 4.3 3.5 - 5.1 mmol/L    Chloride 104 95 - 110 mmol/L    CO2 29 23 - 29 mmol/L    Glucose 76 70 - 110 mg/dL    BUN, Bld 36 (H) 8 - 23 mg/dL    Creatinine 1.4 0.5 - 1.4 mg/dL    Calcium 9.4 8.7 - 10.5 mg/dL    Total Protein 8.0 6.0 - 8.4 g/dL    Albumin 2.7 (L) 3.5 - 5.2 g/dL    Total Bilirubin 0.2 0.1 - 1.0 mg/dL    Alkaline Phosphatase 64 55 - 135 U/L    AST 15 10 - 40 U/L    ALT 19 10 - 44 U/L    Anion Gap 8 8 - 16 mmol/L    eGFR if African American >60.0 >60 mL/min/1.73 m^2    eGFR if non  53.1 (A) >60 mL/min/1.73 m^2   Lactic acid, plasma #1   Result Value Ref Range    Lactate (Lactic Acid) 1.5 0.5 - 2.2 mmol/L   Urinalysis, Reflex to Urine Culture Urine, Clean Catch    Specimen: Urine   Result Value Ref Range    Specimen UA Urine, Clean Catch     Color, UA Yellow Yellow, Straw, Angela    Appearance, UA Clear Clear    pH, UA 6.0 5.0 - 8.0    Specific Gravity, UA <=1.005 (A) 1.005 - 1.030    Protein, UA Negative Negative    Glucose, UA Negative Negative    Ketones, UA Negative Negative    Bilirubin (UA) Negative Negative    Occult Blood UA Negative Negative     Nitrite, UA Negative Negative    Urobilinogen, UA Negative <2.0 EU/dL    Leukocytes, UA Negative Negative   COVID-19 Rapid Screening   Result Value Ref Range    SARS-CoV-2 RNA, Amplification, Qual Negative Negative   D dimer, quantitative   Result Value Ref Range    D-Dimer 0.58 (H) <0.50 mg/L FEU   CK   Result Value Ref Range    CPK 87 20 - 200 U/L   Protime-INR   Result Value Ref Range    Prothrombin Time 10.1 9.0 - 12.5 sec    INR 1.0 0.8 - 1.2   APTT   Result Value Ref Range    aPTT 27.4 21.0 - 32.0 sec   Troponin I   Result Value Ref Range    Troponin I 0.065 (H) 0.000 - 0.026 ng/mL     *Note: Due to a large number of results and/or encounters for the requested time period, some results have not been displayed. A complete set of results can be found in Results Review.      All pertinent labs within the past 24 hours have been reviewed.    Significant Imaging:  Imaging Results          CTA Chest Non-Coronary (PE Study) (Final result)  Result time 06/26/20 19:35:00    Final result by DARELL Bose Sr., MD (06/26/20 19:35:00)                 Impression:      1. There is no pulmonary embolus.  2. There is a mild amount of atelectasis in the inferior aspect of the right middle lobe. There is a mild amount of atelectasis in the inferior aspect of the lingula.  3. There is a streaky opacity in the right lower lobe.  This is characteristic of subsegmental atelectasis or scarring.  4. There is a 10 mm oval shaped sclerotic area in the T10 vertebral body.  This is characteristic of a bone island.  All CT scans at this facility use dose modulation, iterative reconstruction, and/or weight base dosing when appropriate to reduce radiation dose when appropriate to reduce radiation dose to as low as reasonably achievable.      Electronically signed by: Vimal Bose MD  Date:    06/26/2020  Time:    19:35             Narrative:    EXAMINATION:  CTA CHEST NON CORONARY    CLINICAL HISTORY:  PE suspected, intermediate prob,  positive D-dimer;    TECHNIQUE:  Standard chest CT protocol was performed with IV contrast and 3D MIP reformats.  100 mL of Omnipaque 350 contrast material was used for this examination.    COMPARISON:  A chest x-ray performed on 06/26/2020.    FINDINGS:  The size of heart is within normal limits.  There is a mild amount of atherosclerosis.  There is no pulmonary embolus.  There is a mild amount of atelectasis in the inferior aspect of the right middle lobe.  There is a streaky opacity in the right lower lobe.  There is a mild amount of atelectasis in the inferior aspect of the lingula.  There is no pneumothorax or pleural effusion.  There is a 10 mm oval shaped sclerotic area in the T10 vertebral body.                               X-Ray Chest 1 View (Final result)  Result time 06/26/20 19:29:15    Final result by DARELL Bose Sr., MD (06/26/20 19:29:15)                 Impression:      1. There is a persistent mild amount of haziness in the base of the right lung.  This is characteristic of atelectasis or pneumonia.  2. The size of the heart is prominent.  This may be secondary to magnification.  .      Electronically signed by: Vimal Bose MD  Date:    06/26/2020  Time:    19:29             Narrative:    EXAMINATION:  XR CHEST 1 VIEW    CLINICAL HISTORY:  Shortness of breath    COMPARISON:  06/23/2020    FINDINGS:  The size of the heart is prominent.  There is a persistent mild amount of haziness in the base of the right lung.  The left lung is clear.  There is no pneumothorax.  The costophrenic angles are sharp.                               I have reviewed all pertinent imaging results/findings within the past 24 hours.     EKG: (personally reviewed)  Sinus rhythm with PACs, LAFB, no acute ischemic ST-T changes.  No significant change from previous tracings.           Assessment/Plan:     * Gangrene of left foot  - Continue empiric IV vanc.  Pharmacy consult for dosing.  - Analgesics as needed.  -  Podiatry consult.  Will keep NPO after MN for probable surgical intervention.    Diabetic ulcer of toe of left foot associated with type 2 diabetes mellitus, with necrosis of muscle  - Plan as above.    Chronic troponin elevation  - Troponin stable at baseline.  No CP or anginal equivalent.  EKG unrevealing.  - Patient with known nonobstructive CAD per [facility] in 2016.  - Trend serial cardiac enzymes.  - Continue ASA, BB, and statin.    Stage 3 chronic kidney disease  - Creatinine stable at baseline.  Avoid nephrotoxic agents.  Follow daily labs.    PAF (paroxysmal atrial fibrillation)  - Remains in sinus rhythm.  Monitor telemetry.  - Continue home Toprol XL and diltiazem.   - Will hold Eliquis for probable surgery.  Start heparin drip per nomogram for AC.    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  - Continue basal insulin at 40 units nightly.  - AccuChecks with moderate dose SSI.  - Diabetic diet.  - Recent HbA1c of 7.7.    Chronic combined systolic and diastolic HFrEF of 45%  - Clinically compensated on admission.  - Will hold off on IV hydration to prevent volume overload.  - Continue home diuretics, BB, and ACEi.  - Strict I&O's, daily weights, Na/fluid restriction.     Hypertension associated with diabetes  - BP stable.  Continue home lisinopril, Imdur, Toprol XL, diltiazem, and Lasix.      VTE Risk Mitigation (From admission, onward)         Ordered     heparin 25,000 units in dextrose 5% 250 mL (100 units/mL) infusion LOW INTENSITY nomogram - [facility]  Continuous     Question:  Heparin Infusion Adjustment (DO NOT MODIFY ANSWER)  Answer:  [email or web address]\Images\Pharmacy\HeparinInfusions\heparin LOW INTENSITY nomogram for [facility] [id number].pdf    06/26/20 2217     IP VTE HIGH RISK PATIENT  Once      06/26/20 2324     heparin 25,000 units in dextrose 5% (100  units/ml) IV bolus from bag - ADDITIONAL PRN BOLUS - 60 units/kg  As needed (PRN)     Question:  Heparin Infusion Adjustment (DO NOT MODIFY ANSWER)  Answer:  \\[email or web address]\epic\Images\Pharmacy\HeparinInfusions\heparin LOW INTENSITY nomogram for [facility] [id number].pdf    06/26/20 2217     heparin 25,000 units in dextrose 5% (100 units/ml) IV bolus from bag - ADDITIONAL PRN BOLUS - 30 units/kg  As needed (PRN)     Question:  Heparin Infusion Adjustment (DO NOT MODIFY ANSWER)  Answer:  \[email or web address]\epic\Images\Pharmacy\HeparinInfusions\heparin LOW INTENSITY nomogram for [facility] [id number].pdf    06/26/20 2217     Reason for No Pharmacological VTE Prophylaxis  Once     Question:  Reasons:  Answer:  Already adequately anticoagulated on oral Anticoagulants    06/26/20 2217     Place sequential compression device  Until discontinued      06/26/20 2213                   Ruby Chinchilla NP  Department of Hospital Medicine   [facility]

## 2020-06-27 NOTE — ED NOTES
Gave report to Carlo MCGUIRE, [facility], Updated on pt status, Dx, V/S. Pt AAOx3., Denies CP or SOB.  Pt pesonal walker sent with AASI unit.
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
## 2020-06-27 NOTE — ASSESSMENT & PLAN NOTE
- Continue empiric IV vanc.  Pharmacy consult for dosing.  - Analgesics as needed.  - Podiatry consult.  Will keep NPO after MN for probable surgical intervention.

## 2020-06-27 NOTE — ASSESSMENT & PLAN NOTE
- Troponin stable at baseline.  No CP or anginal equivalent.  EKG unrevealing.  - Patient with known nonobstructive CAD per [facility] in 2016.  - Trend serial cardiac enzymes.  - Continue ASA, BB, and statin.

## 2020-06-27 NOTE — ED NOTES
Reassessment of Pain following medication, , pt stated pain level is at 1, no SOB of chest pain. /65, tempt 97.7 oral, HR 85, 97% SAT on room air, respirations 18

## 2020-06-27 NOTE — NURSING
Assumed care, received report from Paget, RN, agree with previous assessment, VSS, denies any discomfort and appears to be in no distress. Will cont to monitor

## 2020-06-27 NOTE — PLAN OF CARE
Verbally Advised patient of rights to appeal , patient received written notification of discharge appeal process . Patient understands and provided verbal consent.

## 2020-06-27 NOTE — ASSESSMENT & PLAN NOTE
- Remains in sinus rhythm.  Monitor telemetry.  - Continue home Toprol XL and diltiazem.   - Will hold Eliquis for probable surgery.  Start heparin drip per nomogram for AC.

## 2020-06-27 NOTE — ED NOTES
Departure assessment. Pt AAOx3 denies CP, SOB and mild left foot pain. Denies further needs and services.

## 2020-06-28 LAB
ANION GAP SERPL CALC-SCNC: 12 MMOL/L (ref 8–16)
APTT BLDCRRT: 36.7 SEC (ref 21–32)
APTT BLDCRRT: 48.6 SEC (ref 21–32)
APTT BLDCRRT: 51.2 SEC (ref 21–32)
BASOPHILS # BLD AUTO: 0.02 K/UL (ref 0–0.2)
BASOPHILS NFR BLD: 0.1 % (ref 0–1.9)
BUN SERPL-MCNC: 30 MG/DL (ref 8–23)
CALCIUM SERPL-MCNC: 8.7 MG/DL (ref 8.7–10.5)
CHLORIDE SERPL-SCNC: 97 MMOL/L (ref 95–110)
CO2 SERPL-SCNC: 30 MMOL/L (ref 23–29)
CREAT SERPL-MCNC: 1.3 MG/DL (ref 0.5–1.4)
DIFFERENTIAL METHOD: ABNORMAL
EOSINOPHIL # BLD AUTO: 0.2 K/UL (ref 0–0.5)
EOSINOPHIL NFR BLD: 1 % (ref 0–8)
ERYTHROCYTE [DISTWIDTH] IN BLOOD BY AUTOMATED COUNT: 14.6 % (ref 11.5–14.5)
EST. GFR  (AFRICAN AMERICAN): >60 ML/MIN/1.73 M^2
EST. GFR  (NON AFRICAN AMERICAN): 58 ML/MIN/1.73 M^2
GLUCOSE SERPL-MCNC: 141 MG/DL (ref 70–110)
HCT VFR BLD AUTO: 33.1 % (ref 40–54)
HGB BLD-MCNC: 10.1 G/DL (ref 14–18)
IMM GRANULOCYTES # BLD AUTO: 0.16 K/UL (ref 0–0.04)
IMM GRANULOCYTES NFR BLD AUTO: 1.1 % (ref 0–0.5)
LYMPHOCYTES # BLD AUTO: 2.7 K/UL (ref 1–4.8)
LYMPHOCYTES NFR BLD: 18.8 % (ref 18–48)
MCH RBC QN AUTO: 26.6 PG (ref 27–31)
MCHC RBC AUTO-ENTMCNC: 30.5 G/DL (ref 32–36)
MCV RBC AUTO: 87 FL (ref 82–98)
MONOCYTES # BLD AUTO: 1 K/UL (ref 0.3–1)
MONOCYTES NFR BLD: 7.2 % (ref 4–15)
NEUTROPHILS # BLD AUTO: 10.3 K/UL (ref 1.8–7.7)
NEUTROPHILS NFR BLD: 71.8 % (ref 38–73)
NRBC BLD-RTO: 0 /100 WBC
PLATELET # BLD AUTO: 165 K/UL (ref 150–350)
PMV BLD AUTO: 12.2 FL (ref 9.2–12.9)
POCT GLUCOSE: 120 MG/DL (ref 70–110)
POCT GLUCOSE: 204 MG/DL (ref 70–110)
POCT GLUCOSE: 208 MG/DL (ref 70–110)
POCT GLUCOSE: 242 MG/DL (ref 70–110)
POTASSIUM SERPL-SCNC: 3.9 MMOL/L (ref 3.5–5.1)
RBC # BLD AUTO: 3.79 M/UL (ref 4.6–6.2)
SODIUM SERPL-SCNC: 139 MMOL/L (ref 136–145)
VANCOMYCIN SERPL-MCNC: 15.8 UG/ML
WBC # BLD AUTO: 14.31 K/UL (ref 3.9–12.7)

## 2020-06-28 PROCEDURE — 63600175 PHARM REV CODE 636 W HCPCS: Mod: HCNC | Performed by: INTERNAL MEDICINE

## 2020-06-28 PROCEDURE — 63600175 PHARM REV CODE 636 W HCPCS: Mod: HCNC | Performed by: NURSE PRACTITIONER

## 2020-06-28 PROCEDURE — 85025 COMPLETE CBC W/AUTO DIFF WBC: CPT | Mod: HCNC

## 2020-06-28 PROCEDURE — 85730 THROMBOPLASTIN TIME PARTIAL: CPT | Mod: HCNC

## 2020-06-28 PROCEDURE — 25000003 PHARM REV CODE 250: Mod: HCNC | Performed by: INTERNAL MEDICINE

## 2020-06-28 PROCEDURE — 25000242 PHARM REV CODE 250 ALT 637 W/ HCPCS: Mod: HCNC | Performed by: EMERGENCY MEDICINE

## 2020-06-28 PROCEDURE — 36415 COLL VENOUS BLD VENIPUNCTURE: CPT | Mod: HCNC

## 2020-06-28 PROCEDURE — 80202 ASSAY OF VANCOMYCIN: CPT | Mod: HCNC

## 2020-06-28 PROCEDURE — 80048 BASIC METABOLIC PNL TOTAL CA: CPT | Mod: HCNC

## 2020-06-28 PROCEDURE — 85730 THROMBOPLASTIN TIME PARTIAL: CPT | Mod: 91,HCNC

## 2020-06-28 PROCEDURE — 94760 N-INVAS EAR/PLS OXIMETRY 1: CPT | Mod: HCNC

## 2020-06-28 PROCEDURE — 25000242 PHARM REV CODE 250 ALT 637 W/ HCPCS: Mod: HCNC | Performed by: INTERNAL MEDICINE

## 2020-06-28 PROCEDURE — 94640 AIRWAY INHALATION TREATMENT: CPT | Mod: HCNC

## 2020-06-28 PROCEDURE — 21400001 HC TELEMETRY ROOM: Mod: HCNC

## 2020-06-28 RX ORDER — SODIUM CHLORIDE 9 MG/ML
INJECTION, SOLUTION INTRAVENOUS CONTINUOUS
Status: ACTIVE | OUTPATIENT
Start: 2020-06-28 | End: 2020-06-29

## 2020-06-28 RX ORDER — CEFEPIME HYDROCHLORIDE 1 G/50ML
1 INJECTION, SOLUTION INTRAVENOUS
Status: DISCONTINUED | OUTPATIENT
Start: 2020-06-28 | End: 2020-06-29 | Stop reason: DRUGHIGH

## 2020-06-28 RX ADMIN — IPRATROPIUM BROMIDE 0.5 MG: 0.5 SOLUTION RESPIRATORY (INHALATION) at 12:06

## 2020-06-28 RX ADMIN — HEPARIN SODIUM 14 UNITS/KG/HR: 10000 INJECTION, SOLUTION INTRAVENOUS at 08:06

## 2020-06-28 RX ADMIN — SODIUM CHLORIDE: 0.9 INJECTION, SOLUTION INTRAVENOUS at 01:06

## 2020-06-28 RX ADMIN — METOPROLOL SUCCINATE 50 MG: 50 TABLET, EXTENDED RELEASE ORAL at 08:06

## 2020-06-28 RX ADMIN — HYDROCODONE BITARTRATE AND ACETAMINOPHEN 1 TABLET: 10; 325 TABLET ORAL at 08:06

## 2020-06-28 RX ADMIN — TAMSULOSIN HYDROCHLORIDE 0.4 MG: 0.4 CAPSULE ORAL at 08:06

## 2020-06-28 RX ADMIN — ACETAMINOPHEN 650 MG: 325 TABLET ORAL at 08:06

## 2020-06-28 RX ADMIN — GABAPENTIN 800 MG: 400 CAPSULE ORAL at 03:06

## 2020-06-28 RX ADMIN — ATORVASTATIN CALCIUM 40 MG: 40 TABLET, FILM COATED ORAL at 08:06

## 2020-06-28 RX ADMIN — LATANOPROST 1 DROP: 50 SOLUTION OPHTHALMIC at 09:06

## 2020-06-28 RX ADMIN — LISINOPRIL 10 MG: 10 TABLET ORAL at 08:06

## 2020-06-28 RX ADMIN — CEFEPIME HYDROCHLORIDE 1 G: 1 INJECTION, SOLUTION INTRAVENOUS at 01:06

## 2020-06-28 RX ADMIN — PANTOPRAZOLE SODIUM 40 MG: 40 TABLET, DELAYED RELEASE ORAL at 08:06

## 2020-06-28 RX ADMIN — FUROSEMIDE 80 MG: 80 TABLET ORAL at 06:06

## 2020-06-28 RX ADMIN — IPRATROPIUM BROMIDE 0.5 MG: 0.5 SOLUTION RESPIRATORY (INHALATION) at 07:06

## 2020-06-28 RX ADMIN — GABAPENTIN 800 MG: 400 CAPSULE ORAL at 08:06

## 2020-06-28 RX ADMIN — ARFORMOTEROL TARTRATE 15 MCG: 15 SOLUTION RESPIRATORY (INHALATION) at 08:06

## 2020-06-28 RX ADMIN — HYDROCODONE BITARTRATE AND ACETAMINOPHEN 1 TABLET: 10; 325 TABLET ORAL at 11:06

## 2020-06-28 RX ADMIN — INSULIN ASPART 4 UNITS: 100 INJECTION, SOLUTION INTRAVENOUS; SUBCUTANEOUS at 08:06

## 2020-06-28 RX ADMIN — MORPHINE SULFATE 2 MG: 2 INJECTION, SOLUTION INTRAMUSCULAR; INTRAVENOUS at 09:06

## 2020-06-28 RX ADMIN — HEPARIN SODIUM 14 UNITS/KG/HR: 10000 INJECTION, SOLUTION INTRAVENOUS at 06:06

## 2020-06-28 RX ADMIN — DILTIAZEM HYDROCHLORIDE 60 MG: 60 TABLET, FILM COATED ORAL at 08:06

## 2020-06-28 RX ADMIN — SERTRALINE HYDROCHLORIDE 100 MG: 50 TABLET ORAL at 08:06

## 2020-06-28 RX ADMIN — MORPHINE SULFATE 2 MG: 2 INJECTION, SOLUTION INTRAMUSCULAR; INTRAVENOUS at 04:06

## 2020-06-28 RX ADMIN — ISOSORBIDE MONONITRATE 60 MG: 60 TABLET, EXTENDED RELEASE ORAL at 08:06

## 2020-06-28 RX ADMIN — INSULIN ASPART 4 UNITS: 100 INJECTION, SOLUTION INTRAVENOUS; SUBCUTANEOUS at 05:06

## 2020-06-28 RX ADMIN — INSULIN DETEMIR 20 UNITS: 100 INJECTION, SOLUTION SUBCUTANEOUS at 08:06

## 2020-06-28 RX ADMIN — BUDESONIDE 0.5 MG: 0.5 SUSPENSION RESPIRATORY (INHALATION) at 07:06

## 2020-06-28 RX ADMIN — ARFORMOTEROL TARTRATE 15 MCG: 15 SOLUTION RESPIRATORY (INHALATION) at 07:06

## 2020-06-28 RX ADMIN — VANCOMYCIN HYDROCHLORIDE 1250 MG: 1.25 INJECTION, POWDER, LYOPHILIZED, FOR SOLUTION INTRAVENOUS at 08:06

## 2020-06-28 RX ADMIN — INSULIN ASPART 4 UNITS: 100 INJECTION, SOLUTION INTRAVENOUS; SUBCUTANEOUS at 12:06

## 2020-06-28 RX ADMIN — CEFEPIME HYDROCHLORIDE 1 G: 1 INJECTION, SOLUTION INTRAVENOUS at 10:06

## 2020-06-28 RX ADMIN — ASPIRIN 81 MG: 81 TABLET, COATED ORAL at 08:06

## 2020-06-28 RX ADMIN — SODIUM CHLORIDE: 0.9 INJECTION, SOLUTION INTRAVENOUS at 08:06

## 2020-06-28 NOTE — ASSESSMENT & PLAN NOTE
- Continue empiric IV vanc.  Pharmacy consult for dosing.  - Analgesics as needed.  - Podiatry consult.  Plan surgical resection of great toe with possible graft application/WoundVac application with Dr. Barb Veras on Monday

## 2020-06-28 NOTE — PLAN OF CARE
Plan of care reviewed with patient, he verbalized understanding.  Pt remains free from falls this shift, fall precautions in place.  Pt remains free from skin breakdown, he turns and repositions independently.  AAOx4, VSS, NAD noted at this time.  Pt remained afebrile.  Room air.  NSR to ST on the tele monitor.  Blood glucose monitoring AC/HS.  Pain controled by PRN morphine and norco.  NS @ 50mL/hr.  Pt admitted for gangrene to left great toe; IV abx, plan for amputation tomorrow.  Pt currently resting comfortably in bed.  Hourly rounding complete.  Will continue to monitor.

## 2020-06-28 NOTE — ASSESSMENT & PLAN NOTE
- Troponin stable at baseline.  No CP or anginal equivalent.  EKG unrevealing.  - Patient with known nonobstructive CAD per [facility] in 2016.  - Trend serial cardiac enzymes.  - Continue ASA, BB, and statin.

## 2020-06-28 NOTE — NURSING
Per MD Adrian, ok to give PRN morhpine 2mg q6h IV as first choice for pain and give norco PO PRN for breakthrough pain.

## 2020-06-28 NOTE — SUBJECTIVE & OBJECTIVE
Interval History:     Review of Systems   Constitutional: Negative for chills, diaphoresis, fatigue and fever.   HENT: Negative for congestion, postnasal drip, rhinorrhea, sinus pressure and sore throat.    Respiratory: Negative for cough, shortness of breath and wheezing.    Cardiovascular: Negative for chest pain, palpitations and leg swelling.   Gastrointestinal: Negative for abdominal pain, diarrhea, nausea and vomiting.   Genitourinary: Negative for dysuria and hematuria.   Musculoskeletal: Positive for arthralgias (left great toe). Negative for back pain and myalgias.   Skin: Positive for color change (left great toe) and wound (left great toe). Negative for pallor and rash.   Neurological: Negative for dizziness, syncope, weakness, light-headedness, numbness and headaches.   All other systems reviewed and are negative.    Objective:     Vital Signs (Most Recent):  Temp: 97.9 °F (36.6 °C) (06/28/20 1128)  Pulse: 90 (06/28/20 1202)  Resp: 18 (06/28/20 1202)  BP: 90/62 (06/28/20 1128)  SpO2: (!) 93 % (06/28/20 1202) Vital Signs (24h Range):  Temp:  [97.5 °F (36.4 °C)-98.8 °F (37.1 °C)] 97.9 °F (36.6 °C)  Pulse:  [] 90  Resp:  [16-22] 18  SpO2:  [92 %-95 %] 93 %  BP: ()/(51-75) 90/62     Weight: 90.1 kg (198 lb 10.2 oz)  Body mass index is 33.05 kg/m².    Intake/Output Summary (Last 24 hours) at 6/28/2020 1257  Last data filed at 6/28/2020 0610  Gross per 24 hour   Intake 1192.47 ml   Output 700 ml   Net 492.47 ml      Physical Exam  Vitals signs and nursing note reviewed.   Constitutional:       General: He is not in acute distress.     Appearance: He is well-developed. He is not diaphoretic.   HENT:      Head: Normocephalic and atraumatic.   Eyes:      Conjunctiva/sclera: Conjunctivae normal.   Neck:      Musculoskeletal: Normal range of motion and neck supple.   Cardiovascular:      Rate and Rhythm: Normal rate and regular rhythm.      Pulses:           Dorsalis pedis pulses are 2+ on the right  side and detected w/ Doppler on the left side.        Posterior tibial pulses are 2+ on the right side and 2+ on the left side.      Heart sounds: S1 normal and S2 normal. No murmur.   Pulmonary:      Effort: Pulmonary effort is normal. No tachypnea, accessory muscle usage or respiratory distress.      Breath sounds: No wheezing, rhonchi or rales.   Abdominal:      General: Bowel sounds are normal. There is no distension.      Palpations: Abdomen is soft.      Tenderness: There is no abdominal tenderness.   Musculoskeletal: Normal range of motion.   Feet:      Left foot:      Skin integrity: Ulcer, skin breakdown, erythema and warmth present.      Comments: Left great toe black, pulse found with doppler to dorsal foot, other toes warm with no discoloration.  Pain to left great toe with decreased ROM.  Skin:     General: Skin is warm.      Capillary Refill: Capillary refill takes less than 2 seconds.      Findings: No rash.      Comments: See pictures below of left great toe gangrene.    Neurological:      Mental Status: He is alert and oriented to person, place, and time.   Psychiatric:         Behavior: Behavior normal.         Significant Labs: All pertinent labs within the past 24 hours have been reviewed.    Significant Imaging: I have reviewed all pertinent imaging results/findings within the past 24 hours.

## 2020-06-28 NOTE — ASSESSMENT & PLAN NOTE
- Cont IVAB   -Podiatrist consulted . Plan surgical resection of great toe with possible graft application/WoundVac application with Dr. Barb Veras on Monday

## 2020-06-28 NOTE — PROGRESS NOTES
Pharmacokinetic Assessment Follow Up: IV Vancomycin    Vancomycin serum concentration assessment(s):  Vancomycin random level @ 0422 this AM = 15.8 mcg/ml   This level was collected ~16 hours after a 15 mg/kg dose (1250 mg)    Vancomycin Regimen Plan:  We re-dosed with 15 mg/kg (1250 mg) again today @ 0836  Next random level due tomorrow 6/29 @ 0430 with AM labs  We will continue to re-dose for levels < 20 mcg/ml  **we are pulse dosing at the request of AILEEN Carolina due to pt's history of AKIs**    Drug levels (last 3 results):  Recent Labs   Lab Result Units 06/27/20  0815 06/28/20  0422   Vancomycin, Random ug/mL 11.5 15.8       Pharmacy will continue to follow and monitor vancomycin.    Please contact pharmacy at extension [phone] for questions regarding this assessment.    Thank you for the consult,   Katherine McArdle, Pharm.D. 6/28/2020 1:43 PM        Patient brief summary:  Crow Green is a 65 y.o. male initiated on antimicrobial therapy with IV Vancomycin for treatment of skin & soft tissue infection (gangrene of L foot)    The patient's current regimen is pulse dosing PRN based on random levels     Drug Allergies:   Review of patient's allergies indicates:   Allergen Reactions    Protamine Hives     Urticaria, possible upper airway swelling       Actual Body Weight:   90.1 kg    Renal Function:   Estimated Creatinine Clearance: 60 mL/min (based on SCr of 1.3 mg/dL). -- up from 1.1      CBC (last 72 hours):  Recent Labs   Lab Result Units 06/26/20  1232 06/27/20  0439 06/28/20  0422   WBC K/uL 18.95* 14.19* 14.31*   Hemoglobin g/dL 9.8* 9.7* 10.1*   Hematocrit % 31.7* 31.5* 33.1*   Platelets K/uL 265 244 165   Gran% % 80.9* 73.0 71.8   Lymph% % 11.5* 19.3 18.8   Mono% % 6.6 6.1 7.2   Eosinophil% % 0.2 1.0 1.0   Basophil% % 0.1 0.1 0.1   Differential Method  Automated Automated Automated       Metabolic Panel (last 72 hours):  Recent Labs   Lab Result Units 06/26/20  1214 06/26/20  1238  06/26/20  1313 06/26/20  1743 06/27/20  0439 06/28/20  0422   Sodium mmol/L  --  141 141  --  139 139   Potassium mmol/L  --  4.2 4.3  --  3.7 3.9   Chloride mmol/L  --  103 104  --  99 97   CO2 mmol/L  --  29 29  --  28 30*   Glucose mg/dL  --  96 76  --  127* 141*   Glucose, UA   --   --   --  Negative  --   --    BUN, Bld mg/dL  --  37* 36*  --  26* 30*   Creatinine mg/dL  --  1.4 1.4  --  1.1 1.3   Creatinine, Random Ur mg/dL 127.0  --   --   --   --   --    Albumin g/dL  --  2.7* 2.7*  --   --   --    Total Bilirubin mg/dL  --  0.1 0.2  --   --   --    Alkaline Phosphatase U/L  --  62 64  --   --   --    AST U/L  --  14 15  --   --   --    ALT U/L  --  19 19  --   --   --        Vancomycin Administrations:  vancomycin given in the last 96 hours                   vancomycin 1.25 g in dextrose 5% 250 mL IVPB (ready to mix) (mg) 1,250 mg New Bag 06/28/20 0836    vancomycin 1.25 g in dextrose 5% 250 mL IVPB (ready to mix) (mg) 1,250 mg New Bag 06/27/20 1145    vancomycin 500 mg in dextrose 5 % 100 mL IVPB (ready to mix system) (mg) 500 mg New Bag 06/26/20 1756    vancomycin 750 mg in dextrose 5 % 250 mL IVPB (ready to mix system) (mg) 750 mg New Bag 06/26/20 1645    vancomycin in dextrose 5 % 1 gram/250 mL IVPB 1,000 mg (mg) 1,000 mg New Bag 06/26/20 1445                Microbiologic Results:  Microbiology Results (last 7 days)     Procedure Component Value Units Date/Time    Blood culture x two cultures. Draw prior to antibiotics. [[id number]] Collected: 06/26/20 1317    Order Status: Completed Specimen: Blood from Peripheral, Hand, Right Updated: 06/28/20 0612     Blood Culture, Routine No Growth to date      No Growth to date    Narrative:      Aerobic and anaerobic    Blood culture x two cultures. Draw prior to antibiotics. [[id number]] Collected: 06/26/20 1313    Order Status: Completed Specimen: Blood from Peripheral, Antecubital, Left Updated: 06/28/20 0612     Blood Culture, Routine No Growth to date      No  Growth to date    Narrative:      Aerobic and anaerobic

## 2020-06-28 NOTE — ASSESSMENT & PLAN NOTE
- Hold metformin, glimepiride, and Ozempic during hospital stay.  - Decrease  basal insulin  To 20  units nightly. Pt will  Be npo after midnight   - AccuChecks with moderate dose SSI.  - Diabetic diet.  - Recent HbA1c of 7.7.

## 2020-06-28 NOTE — PROGRESS NOTES
[facility] Hospital Medicine  Progress Note    Patient Name: Crow Green  MRN: [id number]  Patient Class: IP- Inpatient   Admission Date: 6/26/2020  Length of Stay: 2 days  Attending Physician: Anthony Hester, *  Primary Care Provider: Mateo Lambert DO        Subjective:     Principal Problem:Gangrene of left foot        HPI:  Mr. Green is a 66 yo male with a PMHx of DM II, HTN, HLD, SANDRITA, obesity, PAF on Eliquis, nonischemic CMPY with LVEF of 45%, nonobstructive CAD, and asthma.  He presented to the ED with c/o pain to left great toe that has progressively worsened over the past 1 week.  He was seen by Dr. Veras (Podiatry) last week and prescribed Augmentin and a foot boot.  Patient reports being compliant with antibiotics, but has seen no improvement in left great toe wound.  Associated toe swelling, redness, warmth, black color change, and chills.  The symptoms are aggravated by movement and activity; no alleviating factors.  Denies any drainage, foul odor, weakness, numbness/tingling, foot or ankle pain/swelling, CP, SOB, ABD pain, N/V/D, back pain, HA, lightheadedness, dizziness, syncope, fever or chills.  ED evaluation of left great toe was consistent with gangrene.  Labs showed WBC 18.95, 0% bands, and normal lactic.  Blood cultures were drawn in the ED and IV vanc given.  Hospital Medicine was contacted for admission.       Overview/Hospital Course:  65 y/p aam admitted with a Dx of Left foot gangrene  And  Left foot cellulitis . He was started on [employer] IVAB . Podiatrist was consulted . He had a recent arterial and  Venous LE US which did not show any acute finding .The Blood cx are NGTD.  6/28 Pt was seen and examined at bedside . He is complaining of severe pain  Of the left toe . Podiatrist  Plan for surgical resection of great toe with possible graft application/WoundVac application with Dr. Barb Veras on Monday . The blood cx are NGTD     Interval  History:     Review of Systems   Constitutional: Negative for chills, diaphoresis, fatigue and fever.   HENT: Negative for congestion, postnasal drip, rhinorrhea, sinus pressure and sore throat.    Respiratory: Negative for cough, shortness of breath and wheezing.    Cardiovascular: Negative for chest pain, palpitations and leg swelling.   Gastrointestinal: Negative for abdominal pain, diarrhea, nausea and vomiting.   Genitourinary: Negative for dysuria and hematuria.   Musculoskeletal: Positive for arthralgias (left great toe). Negative for back pain and myalgias.   Skin: Positive for color change (left great toe) and wound (left great toe). Negative for pallor and rash.   Neurological: Negative for dizziness, syncope, weakness, light-headedness, numbness and headaches.   All other systems reviewed and are negative.    Objective:     Vital Signs (Most Recent):  Temp: 97.9 °F (36.6 °C) (06/28/20 1128)  Pulse: 90 (06/28/20 1202)  Resp: 18 (06/28/20 1202)  BP: 90/62 (06/28/20 1128)  SpO2: (!) 93 % (06/28/20 1202) Vital Signs (24h Range):  Temp:  [97.5 °F (36.4 °C)-98.8 °F (37.1 °C)] 97.9 °F (36.6 °C)  Pulse:  [] 90  Resp:  [16-22] 18  SpO2:  [92 %-95 %] 93 %  BP: ()/(51-75) 90/62     Weight: 90.1 kg (198 lb 10.2 oz)  Body mass index is 33.05 kg/m².    Intake/Output Summary (Last 24 hours) at 6/28/2020 1257  Last data filed at 6/28/2020 0610  Gross per 24 hour   Intake 1192.47 ml   Output 700 ml   Net 492.47 ml      Physical Exam  Vitals signs and nursing note reviewed.   Constitutional:       General: He is not in acute distress.     Appearance: He is well-developed. He is not diaphoretic.   HENT:      Head: Normocephalic and atraumatic.   Eyes:      Conjunctiva/sclera: Conjunctivae normal.   Neck:      Musculoskeletal: Normal range of motion and neck supple.   Cardiovascular:      Rate and Rhythm: Normal rate and regular rhythm.      Pulses:           Dorsalis pedis pulses are 2+ on the right side and  detected w/ Doppler on the left side.        Posterior tibial pulses are 2+ on the right side and 2+ on the left side.      Heart sounds: S1 normal and S2 normal. No murmur.   Pulmonary:      Effort: Pulmonary effort is normal. No tachypnea, accessory muscle usage or respiratory distress.      Breath sounds: No wheezing, rhonchi or rales.   Abdominal:      General: Bowel sounds are normal. There is no distension.      Palpations: Abdomen is soft.      Tenderness: There is no abdominal tenderness.   Musculoskeletal: Normal range of motion.   Feet:      Left foot:      Skin integrity: Ulcer, skin breakdown, erythema and warmth present.      Comments: Left great toe black, pulse found with doppler to dorsal foot, other toes warm with no discoloration.  Pain to left great toe with decreased ROM.  Skin:     General: Skin is warm.      Capillary Refill: Capillary refill takes less than 2 seconds.      Findings: No rash.      Comments: See pictures below of left great toe gangrene.    Neurological:      Mental Status: He is alert and oriented to person, place, and time.   Psychiatric:         Behavior: Behavior normal.         Significant Labs: All pertinent labs within the past 24 hours have been reviewed.    Significant Imaging: I have reviewed all pertinent imaging results/findings within the past 24 hours.      Assessment/Plan:      * Gangrene of left foot  - Continue empiric IV vanc.  Pharmacy consult for dosing.  - Analgesics as needed.  - Podiatry consult.  Plan surgical resection of great toe with possible graft application/WoundVac application with Dr. Barb Veras on Monday     Chronic troponin elevation  - Troponin stable at baseline.  No CP or anginal equivalent.  EKG unrevealing.  - Patient with known nonobstructive CAD per [facility] in 2016.  - Trend serial cardiac enzymes.  - Continue ASA, BB, and statin.    Diabetic ulcer of toe of left foot associated with type 2 diabetes mellitus, with necrosis of muscle  -  Cont IVAB   -Podiatrist consulted . Plan surgical resection of great toe with possible graft application/WoundVac application with Dr. Barb Veras on Monday     Stage 3 chronic kidney disease  - Creatinine stable at baseline.  Avoid nephrotoxic agents.  Follow daily labs.    PAF (paroxysmal atrial fibrillation)  - Monitor telemetry.  - Continue home Toprol XL and diltiazem.   - Will hold Eliquis for probable surgery.  Start heparin drip per nomogram for AC.    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  - Decrease  basal insulin  To 20  units nightly. Pt will  Be npo after midnight   - AccuChecks with moderate dose SSI.  - Diabetic diet.  - Recent HbA1c of 7.7.    Chronic combined systolic and diastolic HFrEF of 45%  - Clinically compensated on admission.  - Continue home diuretics, BB, and ACEi.  - Strict I&O's, daily weights, Na/fluid restriction.     Hypertension associated with diabetes  - BP stable.  Continue home lisinopril, Imdur, Toprol XL, diltiazem, and Lasix.      VTE Risk Mitigation (From admission, onward)         Ordered     heparin 25,000 units in dextrose 5% 250 mL (100 units/mL) infusion LOW INTENSITY nomogram - [facility]  Continuous     Question:  Heparin Infusion Adjustment (DO NOT MODIFY ANSWER)  Answer:  \\[email or web address]\Images\Pharmacy\HeparinInfusions\heparin LOW INTENSITY nomogram for [facility] [id number]    06/26/20 2217     IP VTE HIGH RISK PATIENT  Once      06/26/20 2324     heparin 25,000 units in dextrose 5% (100 units/ml) IV bolus from bag - ADDITIONAL PRN BOLUS - 60 units/kg  As needed (PRN)     Question:  Heparin Infusion Adjustment (DO NOT MODIFY ANSWER)  Answer:  \\[facility]\Images\Pharmacy\HeparinInfusions\heparin LOW INTENSITY nomogram for [facility] [id number]    06/26/20 2217     heparin 25,000 units in dextrose 5% (100  units/ml) IV bolus from bag - ADDITIONAL PRN BOLUS - 30 units/kg  As needed (PRN)     Question:  Heparin Infusion Adjustment (DO NOT MODIFY ANSWER)  Answer:  [email or web address]\Images\Pharmacy\HeparinInfusions\heparin LOW INTENSITY nomogram for [facility] [id number]    06/26/20 2217     Reason for No Pharmacological VTE Prophylaxis  Once     Question:  Reasons:  Answer:  Already adequately anticoagulated on oral Anticoagulants    06/26/20 2217     Place sequential compression device  Until discontinued      06/26/20 2213                      Anthony Hester MD  Department of Hospital Medicine   [facility]

## 2020-06-28 NOTE — ASSESSMENT & PLAN NOTE
- Monitor telemetry.  - Continue home Toprol XL and diltiazem.   - Will hold Eliquis for probable surgery.  Start heparin drip per nomogram for AC.

## 2020-06-28 NOTE — CONSULTS
[facility] -   Podiatry  Consult Note    Patient Name: Crow Green  MRN: [id number]  Admission Date: 6/26/2020  Hospital Length of Stay: 1 days  Attending Physician: Anthony Hester, *  Primary Care Provider: Mateo Lambert DO     Inpatient consult to Podiatry  Consult performed by: Checo Turcios DPM  Consult ordered by: Ruby Chinchilla NP        Subjective:     History of Present Illness: 65M, w/ DMII with PAD and neurpathy with other comorbid states, admitted concerning for gangrene of the left hallux. Patient does follow with Dr. Barb Veras DPM as out-patient. Patient admitted due to failing PO ABx. States improvement since admission and initiation of IV ABx. Prior non-invasive vascular studies were WNL. Prior XR negative for aggressive OM.     Scheduled Meds:   arformoteroL  15 mcg Nebulization BID    And    budesonide  0.5 mg Nebulization Q12H    aspirin  81 mg Oral Daily    atorvastatin  40 mg Oral QHS    diltiaZEM  60 mg Oral BID    furosemide  40 mg Oral QHS    furosemide  80 mg Oral QAM    gabapentin  800 mg Oral TID    insulin detemir U-100  40 Units Subcutaneous QHS    ipratropium  0.5 mg Nebulization Q6H    isosorbide mononitrate  60 mg Oral Daily    latanoprost  1 drop Both Eyes QHS    lisinopriL  10 mg Oral Daily    metoprolol succinate  50 mg Oral Daily    pantoprazole  40 mg Oral Daily    sertraline  100 mg Oral QHS    tamsulosin  0.4 mg Oral Daily     Continuous Infusions:   heparin (porcine) in D5W 12 Units/kg/hr (06/26/20 2304)     PRN Meds:acetaminophen, dextrose 50%, glucagon (human recombinant), heparin (PORCINE), heparin (PORCINE), HYDROcodone-acetaminophen, HYDROcodone-acetaminophen, insulin aspart U-100, morphine, nitroGLYCERIN, ondansetron, sodium chloride 0.9%, traZODone, Pharmacy to dose Vancomycin consult **AND** vancomycin - pharmacy to dose    Review of patient's allergies indicates:   Allergen Reactions    Protamine Hives      Urticaria, possible upper airway swelling        Past Medical History:   Diagnosis Date    Acute kidney injury     Arthritis     Asthma     Atrial fibrillation     Atrial fibrillation with RVR 8/7/2019    CHF (congestive heart failure)     Depression     Diabetes mellitus, type 2 Diagnosed in 2000    Elevated PSA     Hypertension     Sleep apnea      Past Surgical History:   Procedure Laterality Date    ABLATION OF ARRHYTHMOGENIC FOCUS FOR ATRIAL FIBRILLATION N/A 2/27/2020    Procedure: Ablation atrial fibrillation;  Surgeon: Gio Brown MD;  Location: [facility] EP LAB;  Service: Cardiology;  Laterality: N/A;  afib, DAMIEN (cx if SR), PVI, PITO, anes, MB, 3 Prep    CHOLECYSTECTOMY      SELECTIVE INJECTION OF ANESTHETIC AGENT AROUND LUMBAR SPINAL NERVE ROOT BY TRANSFORAMINAL APPROACH Left 6/11/2020    Procedure: BLOCK, SPINAL NERVE ROOT, LUMBAR, SELECTIVE, TRANSFORAMINAL APPROACH Left L4-5, L5-S1 TFESI with RN IV sedation;  Surgeon: Dillan Tsai MD;  Location: [facility] PAIN MGT;  Service: Pain Management;  Laterality: Left;       Family History     Problem Relation (Age of Onset)    Cataracts Mother, Father, Sister    Glaucoma Mother, Sister, Maternal Aunt    No Known Problems Brother, Daughter, Son        Tobacco Use    Smoking status: Never Smoker    Smokeless tobacco: Never Used   Substance and Sexual Activity    Alcohol use: No     Comment: occ    Drug use: No    Sexual activity: Yes     Partners: Female     Review of Systems   Constitutional: Positive for activity change. Negative for chills, fatigue and fever.   HENT: Negative for hearing loss.    Eyes: Negative for photophobia and visual disturbance.   Respiratory: Negative for cough, chest tightness, shortness of breath and wheezing.    Cardiovascular: Negative for chest pain and palpitations.   Gastrointestinal: Negative for constipation, diarrhea, nausea and vomiting.   Endocrine: Negative for cold intolerance and heat intolerance.    Genitourinary: Negative for flank pain.   Musculoskeletal: Positive for gait problem. Negative for neck pain and neck stiffness.   Skin: Positive for color change and wound.   Neurological: Positive for numbness. Negative for light-headedness and headaches.   Psychiatric/Behavioral: Negative for sleep disturbance.     Objective:     Vital Signs (Most Recent):  Temp: 98.8 °F (37.1 °C) (06/27/20 1912)  Pulse: 93 (06/27/20 1933)  Resp: 18 (06/27/20 1933)  BP: 114/63 (06/27/20 1912)  SpO2: (!) 94 % (06/27/20 1933) Vital Signs (24h Range):  Temp:  [96.4 °F (35.8 °C)-98.8 °F (37.1 °C)] 98.8 °F (37.1 °C)  Pulse:  [78-95] 93  Resp:  [16-20] 18  SpO2:  [93 %-97 %] 94 %  BP: (108-143)/(63-76) 114/63     Weight: 90.1 kg (198 lb 10.2 oz)  Body mass index is 33.05 kg/m².    Foot Exam    Laboratory:  All pertinent labs reviewed within the last 24 hours.    Diagnostic Results:  I have reviewed all pertinent imaging results/findings within the past 24 hours.    Clinical Findings:  RLE PT is 2/4 and DP is 1/4 w/ Pop. of 2/4. LLE DP is non-palpable and the PT is 1+ to 2-/4. Warm to warm B/L LE. Edema, mild, LLE. Gangrene, 1st toe, LLE. Malodor, moderate, left foot. Stable gangrene. No fluctuance or crepitus is noted.     Assessment/Plan:     Active Diagnoses:    Diagnosis Date Noted POA    PRINCIPAL PROBLEM:  Gangrene of left foot [I96] 06/26/2020 Yes    Diabetic ulcer of toe of left foot associated with type 2 diabetes mellitus, with necrosis of muscle [E11.621, L97.523] 06/26/2020 Yes    Chronic troponin elevation [R79.89] 06/26/2020 Yes     Chronic    Stage 3 chronic kidney disease [N18.3] 04/29/2020 Yes     Chronic    PAF (paroxysmal atrial fibrillation) [I48.0] 03/17/2017 Yes     Chronic    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, with long-term current use of insulin [E11.3299, Z79.4, E11.65] 12/07/2016 Not Applicable     Chronic    Chronic combined systolic  and diastolic HFrEF of 45% [I50.42] 12/05/2016 Yes     Chronic    Hypertension associated with diabetes [E11.59, I10] 02/27/2015 Yes     Chronic      Problems Resolved During this Admission:     Gangrene, left great toe.     6/23/20 Arterial SONO: No hemodynamically significant stenosis demonstrated in the left lower extremity arterial system.    Patient will be pre-op for surgical resection of great toe with possible graft application/WoundVac application with Dr. Barb Veras on Monday in the AM or afternoon.       Thank you for your consult. I will follow-up with patient. Please contact us if you have any additional questions.    Checo Turcios DPM  Podiatry  [facility] [place]

## 2020-06-29 ENCOUNTER — ANESTHESIA EVENT (OUTPATIENT)
Dept: SURGERY | Facility: HOSPITAL | Age: 65
DRG: 256 | End: 2020-06-29
Payer: MEDICARE

## 2020-06-29 ENCOUNTER — ANESTHESIA (OUTPATIENT)
Dept: SURGERY | Facility: HOSPITAL | Age: 65
DRG: 256 | End: 2020-06-29
Payer: MEDICARE

## 2020-06-29 ENCOUNTER — TELEPHONE (OUTPATIENT)
Dept: INTERNAL MEDICINE | Facility: CLINIC | Age: 65
End: 2020-06-29

## 2020-06-29 ENCOUNTER — OUTPATIENT CASE MANAGEMENT (OUTPATIENT)
Dept: ADMINISTRATIVE | Facility: OTHER | Age: 65
End: 2020-06-29

## 2020-06-29 LAB
APTT BLDCRRT: 47.6 SEC (ref 21–32)
CREAT SERPL-MCNC: 1.6 MG/DL (ref 0.5–1.4)
EST. GFR  (AFRICAN AMERICAN): 52 ML/MIN/1.73 M^2
EST. GFR  (NON AFRICAN AMERICAN): 45 ML/MIN/1.73 M^2
POCT GLUCOSE: 207 MG/DL (ref 70–110)
POCT GLUCOSE: 289 MG/DL (ref 70–110)
POCT GLUCOSE: 332 MG/DL (ref 70–110)
VANCOMYCIN SERPL-MCNC: 14.9 UG/ML

## 2020-06-29 PROCEDURE — S0020 INJECTION, BUPIVICAINE HYDRO: HCPCS | Mod: HCNC | Performed by: PODIATRIST

## 2020-06-29 PROCEDURE — 94640 AIRWAY INHALATION TREATMENT: CPT | Mod: HCNC

## 2020-06-29 PROCEDURE — 63600175 PHARM REV CODE 636 W HCPCS: Mod: HCNC | Performed by: PODIATRIST

## 2020-06-29 PROCEDURE — 88311 DECALCIFY TISSUE: CPT | Mod: HCNC | Performed by: PATHOLOGY

## 2020-06-29 PROCEDURE — 36000706: Mod: HCNC | Performed by: PODIATRIST

## 2020-06-29 PROCEDURE — 36415 COLL VENOUS BLD VENIPUNCTURE: CPT | Mod: HCNC

## 2020-06-29 PROCEDURE — 85730 THROMBOPLASTIN TIME PARTIAL: CPT | Mod: HCNC

## 2020-06-29 PROCEDURE — 25000003 PHARM REV CODE 250: Mod: HCNC | Performed by: PODIATRIST

## 2020-06-29 PROCEDURE — 27000221 HC OXYGEN, UP TO 24 HOURS: Mod: HCNC

## 2020-06-29 PROCEDURE — 88305 TISSUE EXAM BY PATHOLOGIST: CPT | Mod: HCNC | Performed by: PATHOLOGY

## 2020-06-29 PROCEDURE — 82565 ASSAY OF CREATININE: CPT | Mod: HCNC

## 2020-06-29 PROCEDURE — 25000003 PHARM REV CODE 250: Mod: HCNC | Performed by: NURSE ANESTHETIST, CERTIFIED REGISTERED

## 2020-06-29 PROCEDURE — 87075 CULTR BACTERIA EXCEPT BLOOD: CPT | Mod: HCNC

## 2020-06-29 PROCEDURE — 87070 CULTURE OTHR SPECIMN AEROBIC: CPT | Mod: HCNC

## 2020-06-29 PROCEDURE — 25000242 PHARM REV CODE 250 ALT 637 W/ HCPCS: Mod: HCNC | Performed by: INTERNAL MEDICINE

## 2020-06-29 PROCEDURE — 37000008 HC ANESTHESIA 1ST 15 MINUTES: Mod: HCNC | Performed by: PODIATRIST

## 2020-06-29 PROCEDURE — 63600175 PHARM REV CODE 636 W HCPCS: Mod: HCNC | Performed by: NURSE ANESTHETIST, CERTIFIED REGISTERED

## 2020-06-29 PROCEDURE — 28810 AMPUTATION TOE & METATARSAL: CPT | Mod: TA,HCNC,, | Performed by: PODIATRIST

## 2020-06-29 PROCEDURE — 25000242 PHARM REV CODE 250 ALT 637 W/ HCPCS: Mod: HCNC | Performed by: PODIATRIST

## 2020-06-29 PROCEDURE — 71000033 HC RECOVERY, INTIAL HOUR: Mod: HCNC | Performed by: PODIATRIST

## 2020-06-29 PROCEDURE — 87077 CULTURE AEROBIC IDENTIFY: CPT | Mod: 59,HCNC

## 2020-06-29 PROCEDURE — 88305 TISSUE EXAM BY PATHOLOGIST: CPT | Mod: 26,,, | Performed by: PATHOLOGY

## 2020-06-29 PROCEDURE — 25000003 PHARM REV CODE 250: Mod: HCNC | Performed by: INTERNAL MEDICINE

## 2020-06-29 PROCEDURE — 36000707: Mod: HCNC | Performed by: PODIATRIST

## 2020-06-29 PROCEDURE — 80202 ASSAY OF VANCOMYCIN: CPT | Mod: HCNC

## 2020-06-29 PROCEDURE — 21400001 HC TELEMETRY ROOM: Mod: HCNC

## 2020-06-29 PROCEDURE — 37000009 HC ANESTHESIA EA ADD 15 MINS: Mod: HCNC | Performed by: PODIATRIST

## 2020-06-29 PROCEDURE — 63600175 PHARM REV CODE 636 W HCPCS: Mod: HCNC | Performed by: INTERNAL MEDICINE

## 2020-06-29 PROCEDURE — 87186 SC STD MICRODIL/AGAR DIL: CPT | Mod: HCNC

## 2020-06-29 PROCEDURE — 28810 PR AMPUTATION METATARSAL+TOE,SINGLE: ICD-10-PCS | Mod: TA,HCNC,, | Performed by: PODIATRIST

## 2020-06-29 PROCEDURE — 88305 TISSUE EXAM BY PATHOLOGIST: ICD-10-PCS | Mod: 26,,, | Performed by: PATHOLOGY

## 2020-06-29 PROCEDURE — 87076 CULTURE ANAEROBE IDENT EACH: CPT | Mod: 59,HCNC

## 2020-06-29 RX ORDER — LIDOCAINE HYDROCHLORIDE 10 MG/ML
INJECTION, SOLUTION EPIDURAL; INFILTRATION; INTRACAUDAL; PERINEURAL
Status: DISCONTINUED | OUTPATIENT
Start: 2020-06-29 | End: 2020-06-29 | Stop reason: HOSPADM

## 2020-06-29 RX ORDER — ONDANSETRON 2 MG/ML
INJECTION INTRAMUSCULAR; INTRAVENOUS
Status: DISCONTINUED | OUTPATIENT
Start: 2020-06-29 | End: 2020-06-29

## 2020-06-29 RX ORDER — PROPOFOL 10 MG/ML
INJECTION, EMULSION INTRAVENOUS
Status: DISCONTINUED | OUTPATIENT
Start: 2020-06-29 | End: 2020-06-29

## 2020-06-29 RX ORDER — CEFEPIME HYDROCHLORIDE 1 G/50ML
1 INJECTION, SOLUTION INTRAVENOUS
Status: DISCONTINUED | OUTPATIENT
Start: 2020-06-29 | End: 2020-06-30

## 2020-06-29 RX ORDER — HEPARIN SODIUM,PORCINE/D5W 25000/250
12 INTRAVENOUS SOLUTION INTRAVENOUS CONTINUOUS
Status: DISCONTINUED | OUTPATIENT
Start: 2020-06-29 | End: 2020-06-29

## 2020-06-29 RX ORDER — FENTANYL CITRATE 50 UG/ML
INJECTION, SOLUTION INTRAMUSCULAR; INTRAVENOUS
Status: DISCONTINUED | OUTPATIENT
Start: 2020-06-29 | End: 2020-06-29

## 2020-06-29 RX ORDER — ONDANSETRON 2 MG/ML
4 INJECTION INTRAMUSCULAR; INTRAVENOUS DAILY PRN
Status: DISCONTINUED | OUTPATIENT
Start: 2020-06-29 | End: 2020-06-29 | Stop reason: HOSPADM

## 2020-06-29 RX ORDER — SODIUM CHLORIDE, SODIUM LACTATE, POTASSIUM CHLORIDE, CALCIUM CHLORIDE 600; 310; 30; 20 MG/100ML; MG/100ML; MG/100ML; MG/100ML
INJECTION, SOLUTION INTRAVENOUS CONTINUOUS PRN
Status: DISCONTINUED | OUTPATIENT
Start: 2020-06-29 | End: 2020-06-29

## 2020-06-29 RX ORDER — OXYCODONE AND ACETAMINOPHEN 5; 325 MG/1; MG/1
1 TABLET ORAL
Status: DISCONTINUED | OUTPATIENT
Start: 2020-06-29 | End: 2020-06-29 | Stop reason: HOSPADM

## 2020-06-29 RX ORDER — HYDROMORPHONE HYDROCHLORIDE 2 MG/ML
0.2 INJECTION, SOLUTION INTRAMUSCULAR; INTRAVENOUS; SUBCUTANEOUS EVERY 5 MIN PRN
Status: DISCONTINUED | OUTPATIENT
Start: 2020-06-29 | End: 2020-06-29 | Stop reason: HOSPADM

## 2020-06-29 RX ORDER — KETAMINE HYDROCHLORIDE 50 MG/ML
INJECTION, SOLUTION INTRAMUSCULAR; INTRAVENOUS
Status: DISCONTINUED | OUTPATIENT
Start: 2020-06-29 | End: 2020-06-29

## 2020-06-29 RX ORDER — BUPIVACAINE HYDROCHLORIDE 5 MG/ML
INJECTION, SOLUTION EPIDURAL; INTRACAUDAL
Status: DISCONTINUED | OUTPATIENT
Start: 2020-06-29 | End: 2020-06-29 | Stop reason: HOSPADM

## 2020-06-29 RX ORDER — KETOROLAC TROMETHAMINE 30 MG/ML
15 INJECTION, SOLUTION INTRAMUSCULAR; INTRAVENOUS EVERY 8 HOURS PRN
Status: DISCONTINUED | OUTPATIENT
Start: 2020-06-29 | End: 2020-06-29 | Stop reason: HOSPADM

## 2020-06-29 RX ORDER — MIDAZOLAM HYDROCHLORIDE 1 MG/ML
INJECTION, SOLUTION INTRAMUSCULAR; INTRAVENOUS
Status: DISCONTINUED | OUTPATIENT
Start: 2020-06-29 | End: 2020-06-29

## 2020-06-29 RX ORDER — LIDOCAINE HCL/PF 100 MG/5ML
SYRINGE (ML) INTRAVENOUS
Status: DISCONTINUED | OUTPATIENT
Start: 2020-06-29 | End: 2020-06-29

## 2020-06-29 RX ADMIN — MIDAZOLAM 2 MG: 1 INJECTION INTRAMUSCULAR; INTRAVENOUS at 07:06

## 2020-06-29 RX ADMIN — INSULIN ASPART 6 UNITS: 100 INJECTION, SOLUTION INTRAVENOUS; SUBCUTANEOUS at 04:06

## 2020-06-29 RX ADMIN — HYDROCODONE BITARTRATE AND ACETAMINOPHEN 1 TABLET: 5; 325 TABLET ORAL at 04:06

## 2020-06-29 RX ADMIN — MORPHINE SULFATE 2 MG: 2 INJECTION, SOLUTION INTRAMUSCULAR; INTRAVENOUS at 07:06

## 2020-06-29 RX ADMIN — KETAMINE HYDROCHLORIDE 20 MG: 50 INJECTION INTRAMUSCULAR; INTRAVENOUS at 07:06

## 2020-06-29 RX ADMIN — SODIUM CHLORIDE, SODIUM LACTATE, POTASSIUM CHLORIDE, AND CALCIUM CHLORIDE: .6; .31; .03; .02 INJECTION, SOLUTION INTRAVENOUS at 07:06

## 2020-06-29 RX ADMIN — PANTOPRAZOLE SODIUM 40 MG: 40 TABLET, DELAYED RELEASE ORAL at 09:06

## 2020-06-29 RX ADMIN — IPRATROPIUM BROMIDE 0.5 MG: 0.5 SOLUTION RESPIRATORY (INHALATION) at 01:06

## 2020-06-29 RX ADMIN — VANCOMYCIN HYDROCHLORIDE 1500 MG: 1.5 INJECTION, POWDER, LYOPHILIZED, FOR SOLUTION INTRAVENOUS at 10:06

## 2020-06-29 RX ADMIN — ARFORMOTEROL TARTRATE 15 MCG: 15 SOLUTION RESPIRATORY (INHALATION) at 07:06

## 2020-06-29 RX ADMIN — FENTANYL CITRATE 50 MCG: 50 INJECTION, SOLUTION INTRAMUSCULAR; INTRAVENOUS at 07:06

## 2020-06-29 RX ADMIN — HYDROCODONE BITARTRATE AND ACETAMINOPHEN 1 TABLET: 10; 325 TABLET ORAL at 03:06

## 2020-06-29 RX ADMIN — PROPOFOL 20 MG: 10 INJECTION, EMULSION INTRAVENOUS at 07:06

## 2020-06-29 RX ADMIN — SERTRALINE HYDROCHLORIDE 100 MG: 50 TABLET ORAL at 09:06

## 2020-06-29 RX ADMIN — ISOSORBIDE MONONITRATE 60 MG: 60 TABLET, EXTENDED RELEASE ORAL at 09:06

## 2020-06-29 RX ADMIN — INSULIN DETEMIR 20 UNITS: 100 INJECTION, SOLUTION SUBCUTANEOUS at 09:06

## 2020-06-29 RX ADMIN — CEFEPIME HYDROCHLORIDE 1 G: 1 INJECTION, SOLUTION INTRAVENOUS at 04:06

## 2020-06-29 RX ADMIN — CEFEPIME HYDROCHLORIDE 1 G: 1 INJECTION, SOLUTION INTRAVENOUS at 05:06

## 2020-06-29 RX ADMIN — IPRATROPIUM BROMIDE 0.5 MG: 0.5 SOLUTION RESPIRATORY (INHALATION) at 07:06

## 2020-06-29 RX ADMIN — HYDROCODONE BITARTRATE AND ACETAMINOPHEN 1 TABLET: 10; 325 TABLET ORAL at 09:06

## 2020-06-29 RX ADMIN — ASPIRIN 81 MG: 81 TABLET, COATED ORAL at 09:06

## 2020-06-29 RX ADMIN — INSULIN ASPART 4 UNITS: 100 INJECTION, SOLUTION INTRAVENOUS; SUBCUTANEOUS at 05:06

## 2020-06-29 RX ADMIN — BUDESONIDE 0.5 MG: 0.5 SUSPENSION RESPIRATORY (INHALATION) at 07:06

## 2020-06-29 RX ADMIN — GABAPENTIN 800 MG: 400 CAPSULE ORAL at 03:06

## 2020-06-29 RX ADMIN — ONDANSETRON 4 MG: 2 INJECTION, SOLUTION INTRAMUSCULAR; INTRAVENOUS at 07:06

## 2020-06-29 RX ADMIN — GABAPENTIN 800 MG: 400 CAPSULE ORAL at 09:06

## 2020-06-29 RX ADMIN — FUROSEMIDE 80 MG: 80 TABLET ORAL at 09:06

## 2020-06-29 RX ADMIN — TRAZODONE HYDROCHLORIDE 200 MG: 100 TABLET ORAL at 09:06

## 2020-06-29 RX ADMIN — METOPROLOL SUCCINATE 50 MG: 50 TABLET, EXTENDED RELEASE ORAL at 09:06

## 2020-06-29 RX ADMIN — DILTIAZEM HYDROCHLORIDE 60 MG: 60 TABLET, FILM COATED ORAL at 09:06

## 2020-06-29 RX ADMIN — KETAMINE HYDROCHLORIDE 10 MG: 50 INJECTION INTRAMUSCULAR; INTRAVENOUS at 07:06

## 2020-06-29 RX ADMIN — TAMSULOSIN HYDROCHLORIDE 0.4 MG: 0.4 CAPSULE ORAL at 09:06

## 2020-06-29 RX ADMIN — LIDOCAINE HYDROCHLORIDE 50 MG: 20 INJECTION, SOLUTION INTRAVENOUS at 07:06

## 2020-06-29 RX ADMIN — FUROSEMIDE 40 MG: 40 TABLET ORAL at 09:06

## 2020-06-29 RX ADMIN — INSULIN ASPART 4 UNITS: 100 INJECTION, SOLUTION INTRAVENOUS; SUBCUTANEOUS at 09:06

## 2020-06-29 RX ADMIN — LISINOPRIL 10 MG: 10 TABLET ORAL at 09:06

## 2020-06-29 RX ADMIN — MORPHINE SULFATE 2 MG: 2 INJECTION, SOLUTION INTRAMUSCULAR; INTRAVENOUS at 12:06

## 2020-06-29 RX ADMIN — IPRATROPIUM BROMIDE 0.5 MG: 0.5 SOLUTION RESPIRATORY (INHALATION) at 12:06

## 2020-06-29 RX ADMIN — ATORVASTATIN CALCIUM 40 MG: 40 TABLET, FILM COATED ORAL at 09:06

## 2020-06-29 NOTE — BRIEF OP NOTE
[facility] -   Brief Operative Note    SUMMARY     Surgery Date: 6/29/2020     Surgeon(s) and Role:     * Barb Veras DPM - Primary    Assisting Surgeon: None    Pre-op Diagnosis:  Gangrene of left foot [I96]    Post-op Diagnosis:  Post-Op Diagnosis Codes:     * Gangrene of left foot [I96]    Procedure(s) (LRB):  AMPUTATION, TOE (Left)    Anesthesia: Local MAC    Description of Procedure: Left hallux amputation    Description of the findings of the procedure: Gangrenous changes to left hallux, abscess noted to left lateral 1st MPJ    Estimated Blood Loss: 10cc recorded between 6/29/2020  7:29 AM and 6/29/2020  8:02 AM *         Specimens:   Specimen (12h ago, onward)    None

## 2020-06-29 NOTE — ASSESSMENT & PLAN NOTE
- Monitor telemetry.  - Continue home Toprol XL and diltiazem.   - D/C heparin drip  -Resume elequis tomorrow  If ok with podiatrist

## 2020-06-29 NOTE — PROGRESS NOTES
6/29/20 - Chart reviewed and noted patient is currently inpatient at [facility]. Secure chat message sent to [facility] regarding [facility]PCM referral at discharge. Dang Clay RN

## 2020-06-29 NOTE — ANESTHESIA POSTPROCEDURE EVALUATION
Anesthesia Post Evaluation    Patient: Crow Green    Procedure(s) Performed: Procedure(s) (LRB):  AMPUTATION, TOE (Left)    Final Anesthesia Type: MAC    Patient location during evaluation: PACU  Patient participation: Yes- Able to Participate  Level of consciousness: awake and alert  Post-procedure vital signs: reviewed and stable  Pain management: adequate  Airway patency: patent  SANDRITA mitigation strategies: Multimodal analgesia  PONV status at discharge: No PONV  Anesthetic complications: no      Cardiovascular status: hemodynamically stable  Respiratory status: spontaneous ventilation  Hydration status: euvolemic  Follow-up not needed.          Vitals Value Taken Time   /79 06/29/20 0850   Temp 36.8 °C (98.2 °F) 06/29/20 0815   Pulse 80 06/29/20 0850   Resp 17 06/29/20 0849   SpO2 99 % 06/29/20 0850   Vitals shown include unvalidated device data.      No case tracking events are documented in the log.      Pain/Raymond Score: Pain Rating Prior to Med Admin: 5 (6/29/2020  4:07 AM)  Pain Rating Post Med Admin: 0 (6/29/2020  5:07 AM)  Raymond Score: 8 (6/29/2020  8:45 AM)

## 2020-06-29 NOTE — SUBJECTIVE & OBJECTIVE
Interval History:     Review of Systems   Constitutional: Negative for chills, diaphoresis, fatigue and fever.   HENT: Negative for congestion, postnasal drip, rhinorrhea, sinus pressure and sore throat.    Respiratory: Negative for cough, shortness of breath and wheezing.    Cardiovascular: Negative for chest pain, palpitations and leg swelling.   Gastrointestinal: Negative for abdominal pain, diarrhea, nausea and vomiting.   Genitourinary: Negative for dysuria and hematuria.   Musculoskeletal: Positive for arthralgias (left great toe). Negative for back pain and myalgias.   Skin: Positive for color change (left great toe) and wound (left great toe). Negative for pallor and rash.   Neurological: Negative for dizziness, syncope, weakness, light-headedness, numbness and headaches.   All other systems reviewed and are negative.    Objective:     Vital Signs (Most Recent):  Temp: 97.7 °F (36.5 °C) (06/29/20 1159)  Pulse: 78 (06/29/20 1300)  Resp: 20 (06/29/20 1241)  BP: 119/67 (06/29/20 1159)  SpO2: (!) 93 % (06/29/20 1241) Vital Signs (24h Range):  Temp:  [97.5 °F (36.4 °C)-99.9 °F (37.7 °C)] 97.7 °F (36.5 °C)  Pulse:  [76-97] 78  Resp:  [14-22] 20  SpO2:  [92 %-100 %] 93 %  BP: ()/(50-78) 119/67     Weight: 88.5 kg (195 lb 1.7 oz)  Body mass index is 32.47 kg/m².    Intake/Output Summary (Last 24 hours) at 6/29/2020 1415  Last data filed at 6/29/2020 0756  Gross per 24 hour   Intake 2053.33 ml   Output 900 ml   Net 1153.33 ml      Physical Exam  Vitals signs and nursing note reviewed.   Constitutional:       General: He is not in acute distress.     Appearance: He is well-developed. He is not diaphoretic.   HENT:      Head: Normocephalic and atraumatic.   Eyes:      Conjunctiva/sclera: Conjunctivae normal.   Neck:      Musculoskeletal: Normal range of motion and neck supple.   Cardiovascular:      Rate and Rhythm: Normal rate and regular rhythm.      Pulses:           Dorsalis pedis pulses are 2+ on the right  side and detected w/ Doppler on the left side.        Posterior tibial pulses are 2+ on the right side and 2+ on the left side.      Heart sounds: S1 normal and S2 normal. No murmur.   Pulmonary:      Effort: Pulmonary effort is normal. No tachypnea, accessory muscle usage or respiratory distress.      Breath sounds: No wheezing, rhonchi or rales.   Abdominal:      General: Bowel sounds are normal. There is no distension.      Palpations: Abdomen is soft.      Tenderness: There is no abdominal tenderness.   Musculoskeletal: Normal range of motion.   Feet:      Left foot:      Skin integrity: Ulcer, skin breakdown, erythema and warmth present.      Comments: Left toe amputation . Covered .  Skin:     General: Skin is warm.      Capillary Refill: Capillary refill takes less than 2 seconds.      Findings: No rash.   Neurological:      Mental Status: He is alert and oriented to person, place, and time.   Psychiatric:         Behavior: Behavior normal.         Significant Labs: All pertinent labs within the past 24 hours have been reviewed.    Significant Imaging: I have reviewed all pertinent imaging results/findings within the past 24 hours.

## 2020-06-29 NOTE — ASSESSMENT & PLAN NOTE
- Troponin stable at baseline.  No CP or anginal equivalent.  EKG unrevealing.  - Patient with known nonobstructive CAD per [facility] in 2016.  - Trend serial cardiac enzymes.  - Continue ASA, BB, and statin.

## 2020-06-29 NOTE — PLAN OF CARE
Plan of care reviewed with patient, he verbalized understanding.  Pt remains free from falls this shift, fall precautions in place.  Pt remains free from skin breakdown, he turns and repositions independently.  AAOx4, VSS, NAD noted at this time.  Pt remained afebrile.  Room air.  NSR to ST on the tele monitor.  Blood glucose monitoring AC/HS.  Pain controled by PRN morphine and norco.  Pt admitted for gangrene to left great toe; pt had left great toe amputated this morning; dressing in place; pt experiencing moderate to severe pain, PRN meds given per order; IV abx.  Pt currently resting comfortably in bed.  Hourly rounding complete.  Will continue to monitor.

## 2020-06-29 NOTE — ANESTHESIA PREPROCEDURE EVALUATION
06/29/2020  Crow Geren is a 65 y.o., male.    Anesthesia Evaluation    I have reviewed the Patient Summary Reports.    I have reviewed the Nursing Notes.    I have reviewed the Medications.     Review of Systems  Anesthesia Hx:  No problems with previous Anesthesia    Social:  Non-Smoker    Hematology/Oncology:         -- Anemia: Hematology Comments: On heparin   Cardiovascular:   Hypertension Angina CHF    Pulmonary:   Asthma Shortness of breath Sleep Apnea    Renal/:   Chronic Renal Disease    Hepatic/GI:   GERD    Musculoskeletal:   Arthritis     Neurological:   Neuromuscular Disease,    Endocrine:   Diabetes    Psych:   Psychiatric History        Patient Active Problem List   Diagnosis    ED (erectile dysfunction)    Non-proliferative diabetic retinopathy, mild, both eyes    Hypertension associated with diabetes    Diabetic polyneuropathy    Nonobstructive coronary artery disease of native artery of native heart    Chronic combined systolic and diastolic HFrEF of 45%    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, with long-term current use of insulin    SANDRITA on CPAP    Moderate asthma    Psychophysiological insomnia    Nightmares    Sleep related gastroesophageal reflux disease    Inadequate sleep hygiene    PAF (paroxysmal atrial fibrillation)    At risk for medication noncompliance    Obesity (BMI 30.0-34.9)    Indeterminate stage chronic open angle glaucoma    Right hip pain    Gait instability    Chronic right shoulder pain    Arthritis    Left sided sciatica    Dyspnea on exertion    Osteoarthritis of spine with radiculopathy, lumbar region    HNP (herniated nucleus pulposus), lumbar    Gastroesophageal reflux disease without esophagitis    Chronic diastolic heart failure    Hypogonadism in male     Disorder of parathyroid gland    Hyperlipidemia associated with type 2 diabetes mellitus    LRTI (lower respiratory tract infection)    Physical deconditioning    Acute pain of left shoulder    Traumatic tear of left rotator cuff    Atrial fibrillation with RVR    Other chronic pain    Left shoulder pain    Complete tear of left rotator cuff    Stage 3 chronic kidney disease    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 associated with type 2 diabetes mellitus    Lumbar radiculopathy    Diabetic ulcer of toe of left foot associated with type 2 diabetes mellitus, with necrosis of muscle    Gangrene of left foot    Ulcer of left foot    Chronic troponin elevation     No current facility-administered medications on file prior to encounter.      Current Outpatient Medications on File Prior to Encounter   Medication Sig Dispense Refill    ALCOHOL ANTISEPTIC PADS (ALCOHOL PREP PADS TOP)       allopurinoL (ZYLOPRIM) 100 MG tablet Take 1 tablet (100 mg total) by mouth once daily. 30 tablet 0    amoxicillin-clavulanate 875-125mg (AUGMENTIN) 875-125 mg per tablet Take 1 tablet by mouth every 12 (twelve) hours. for 10 days 20 tablet 0    aspirin (ECOTRIN) 81 MG EC tablet Take 1 tablet (81 mg total) by mouth once daily.  0    atorvastatin (LIPITOR) 40 MG tablet TAKE 1 TABLET EVERY EVENING 90 tablet 4    azaTHIOprine (IMURAN) 50 mg Tab Take 1 tablet (50 mg total) by mouth once daily. 90 tablet 0    baclofen (LIORESAL) 10 MG tablet Take 1 tablet (10 mg total) by mouth once daily. 90 tablet 3    blood sugar diagnostic (TRUE METRIX GLUCOSE TEST STRIP) Strp Use with blood glucose meter kit to test blood sugar four times  daily 200 strip 99    blood-glucose meter (TRUE METRIX AIR GLUCOSE METER) kit TEST FOUR TIMES DAILY BEFORE MEALS  AND EVERY NIGHT 1 each 0    colchicine (COLCRYS) 0.6 mg tablet Take 1 tablet (0.6 mg total) by mouth once daily. 30 tablet 0    diclofenac  sodium (VOLTAREN) 1 % Gel Apply 2 g topically 4 (four) times daily. 1 Tube 5    diltiaZEM (CARDIZEM SR) 60 MG Cp12 Take 1 capsule (60 mg total) by mouth 2 (two) times daily. 60 capsule 11    ELIQUIS 5 mg Tab Take 1 tablet (5 mg total) by mouth 2 (two) times daily. 60 tablet 0    fluticasone-salmeterol diskus inhaler 250-50 mcg Inhale 1 puff into the lungs 2 (two) times daily. Controller 60 each 11    food supplemt, lactose-reduced (ENSURE) Liqd Take 118 mLs by mouth 3 (three) times daily with meals. 1 Bottle 4    furosemide (LASIX) 40 MG tablet TAKE 2 TABLETS EVERY MORNING  AND TAKE 1 TABLET EVERY EVENING 270 tablet 0    gabapentin (NEURONTIN) 800 MG tablet Take 1 tablet (800 mg total) by mouth 3 (three) times daily. 270 tablet 4    glimepiride (AMARYL) 2 MG tablet TAKE 1 TABLET (2 MG TOTAL) BY MOUTH BEFORE BREAKFAST. 90 tablet 3    HYDROcodone-acetaminophen (NORCO) 5-325 mg per tablet Take 1 tablet by mouth every 6 (six) hours as needed for Pain. 9 tablet 0    inhalation spacing device Use as directed for inhalation. 1 Device 0    insulin (LANTUS SOLOSTAR U-100 INSULIN) glargine 100 units/mL (3mL) SubQ pen INJECT 80 UNITS SUBCUTANEOUSLY EVERY EVENING 75 mL 4    insulin aspart U-100 (NOVOLOG FLEXPEN U-100 INSULIN) 100 unit/mL (3 mL) InPn pen INJECT 20 UNITS SUBCUTANEOUSLY THREE TIMES DAILY WITH MEALS 15 mL 0    insulin syringe-needle U-100 1 mL 31 gauge x 5/16 Syrg       ipratropium (ATROVENT) 0.02 % nebulizer solution USE 1 VIAL VIA NEBULIZER FOUR TIMES DAILY 937.5 mL 2    isosorbide mononitrate (IMDUR) 60 MG 24 hr tablet Take 1 tablet (60 mg total) by mouth once daily. 90 tablet 3    ketorolac (TORADOL) 10 mg tablet Take 1 tablet (10 mg total) by mouth every 8 (eight) hours as needed for Pain. 20 tablet 0    lancets 32 gauge Misc 1 lancet by Misc.(Non-Drug; Combo Route) route 2 (two) times daily. 100 each 11    latanoprost 0.005 % ophthalmic solution INSTILL 1 DROP INTO BOTH EYES EVERY EVENING 1  "Bottle 2    lisinopril 10 MG tablet Take 1 tablet (10 mg total) by mouth once daily. 90 tablet 3    metFORMIN (GLUCOPHAGE-XR) 500 MG XR 24hr tablet Take 2 tablets (1,000 mg total) by mouth 2 (two) times daily with meals. 360 tablet 3    methylPREDNISolone (MEDROL DOSEPACK) 4 mg tablet Take as directed on the package. 1 Package 0    metoprolol succinate (TOPROL-XL) 50 MG 24 hr tablet Take 1 tablet (50 mg total) by mouth once daily. 90 tablet 3    nitroGLYCERIN (NITROSTAT) 0.3 MG SL tablet PLACE 1 TABLET UNDER THE TONGUE EVERY 5 MINUTES AS NEEDED FOR CHEST PAIN, NO MORE THAN 3 TABLETS IN ONE  tablet 0    OZEMPIC 1 mg/dose (2 mg/1.5 mL) PnIj       pantoprazole (PROTONIX) 40 MG tablet Take 1 tablet (40 mg total) by mouth once daily. 30 tablet 11    pen needle, diabetic (BD ULTRA-FINE SHORT PEN NEEDLE) 31 gauge x 5/16" Ndle Inject 1 each into the skin 3 (three) times daily. 100 each 11    predniSONE (DELTASONE) 5 MG tablet Take 1 tablet (5 mg total) by mouth 2 (two) times daily. 60 tablet 0    sertraline (ZOLOFT) 100 MG tablet Take 1 tablet (100 mg total) by mouth every evening. 90 tablet 4    sertraline (ZOLOFT) 50 MG tablet TAKE 1 TABLET ONE TIME DAILY 90 tablet 3    silver sulfADIAZINE 1% (SILVADENE) 1 % cream Apply topically 2 (two) times daily. 50 g 0    tamsulosin (FLOMAX) 0.4 mg Cap Take 1 capsule (0.4 mg total) by mouth once daily. 30 capsule 11    tiotropium (SPIRIVA WITH HANDIHALER) 18 mcg inhalation capsule Inhale 1 capsule (18 mcg total) into the lungs once daily. Controller 90 capsule 0    traZODone (DESYREL) 100 MG tablet Take 2 tablets (200 mg total) by mouth every evening. 180 tablet 1    VIOS AEROSOL DELIVERY SYSTEM Shefali USE AS DIRESTED  0     Past Surgical History:   Procedure Laterality Date    ABLATION OF ARRHYTHMOGENIC FOCUS FOR ATRIAL FIBRILLATION N/A 2/27/2020    Procedure: Ablation atrial fibrillation;  Surgeon: Gio Brown MD;  [facility]: [facility];  Service: " Cardiology;  Laterality: N/A;  afib, DAMIEN (cx if SR), PVI, PITO, anes, MB, 3 Prep    CHOLECYSTECTOMY      SELECTIVE INJECTION OF ANESTHETIC AGENT AROUND LUMBAR SPINAL NERVE ROOT BY TRANSFORAMINAL APPROACH Left 6/11/2020    Procedure: BLOCK, SPINAL NERVE ROOT, LUMBAR, SELECTIVE, TRANSFORAMINAL APPROACH Left L4-5, L5-S1 TFESI with RN IV sedation;  Surgeon: Dillan Tsai MD;  Location: [facility];  Service: Pain Management;  Laterality: Left;         Physical Exam  General:  Well nourished    Airway/Jaw/Neck:  Airway Findings: Mouth Opening: Normal Tongue: Normal  General Airway Assessment: Adult  Mallampati: II  TM Distance: 4 - 6 cm  Jaw/Neck Findings:  Neck ROM: Normal ROM      Dental:  Dental Findings: In tact   Chest/Lungs:  Chest/Lungs Findings: Clear to auscultation, Normal Respiratory Rate     Heart/Vascular:  Heart Findings: Rate: Normal  Rhythm: Regular Rhythm  Sounds: Normal        Mental Status:  Mental Status Findings:  Cooperative, Alert and Oriented         Anesthesia Plan  Type of Anesthesia, risks & benefits discussed:  Anesthesia Type:  MAC  Patient's Preference:   Intra-op Monitoring Plan: standard ASA monitors  Intra-op Monitoring Plan Comments:   Post Op Pain Control Plan: multimodal analgesia and per primary service following discharge from PACU  Post Op Pain Control Plan Comments:   Induction:   IV  Beta Blocker:  Patient is on a Beta-Blocker and has received one dose within the past 24 hours (No further documentation required).       Informed Consent: Patient understands risks and agrees with Anesthesia plan.  Questions answered. Anesthesia consent signed with patient.  ASA Score: 3     Day of Surgery Review of History & Physical:  There are no significant changes.          Ready For Surgery From Anesthesia Perspective.       Chemistry        Component Value Date/Time     06/28/2020 0422    K 3.9 06/28/2020 0422    CL 97 06/28/2020 0422    CO2 30 (H) 06/28/2020 0422    BUN 30 (H)  06/28/2020 0422    CREATININE 1.3 06/28/2020 0422     (H) 06/28/2020 0422        Component Value Date/Time    CALCIUM 8.7 06/28/2020 0422    ALKPHOS 64 06/26/2020 1313    AST 15 06/26/2020 1313    ALT 19 06/26/2020 1313    BILITOT 0.2 06/26/2020 1313    ESTGFRAFRICA >60 06/28/2020 0422    EGFRNONAA 58 (A) 06/28/2020 0422        Lab Results   Component Value Date    WBC 14.31 (H) 06/28/2020    HGB 10.1 (L) 06/28/2020    HCT 33.1 (L) 06/28/2020    MCV 87 06/28/2020     06/28/2020       Echo 2/27/20:  · Normal appearing Left atrial appendage. Velocity 0.7m/s. No thrombus visualized in the BARBARA or left atrium.  · Mildly decreased left ventricular systolic function. The estimated ejection fraction is 45%.  · Low normal right ventricular systolic function.  · Grade 2 plaque present in the ascending aorta and transverse aorta.     PFT's: 5/28/19  Mild restriction based on reduced forced vital capacity (FVC - Forced Vital Capacity). Order lung volumes if clinically indicated

## 2020-06-29 NOTE — TRANSFER OF CARE
"Anesthesia Transfer of Care Note    Patient: Crow Green    Procedure(s) Performed: Procedure(s) (LRB):  AMPUTATION, TOE (Left)    Patient location: PACU    Anesthesia Type: MAC    Transport from OR: Transported from OR on room air with adequate spontaneous ventilation    Post pain: adequate analgesia    Post assessment: no apparent anesthetic complications    Post vital signs: stable    Level of consciousness: awake    Nausea/Vomiting: no nausea/vomiting    Complications: none    Transfer of care protocol was followed      Last vitals:   Visit Vitals  /78 (BP Location: Right arm, Patient Position: Lying)   Pulse 85   Temp 36.8 °C (98.2 °F) (Skin)   Resp (!) 22   Ht 5' 5" (1.651 m)   Wt 88.5 kg (195 lb 1.7 oz)   SpO2 (!) 94%   BMI 32.47 kg/m²     "

## 2020-06-29 NOTE — ASSESSMENT & PLAN NOTE
- Continue empiric IV vanc.  Pharmacy consult for dosing.  - Analgesics as needed.  - Podiatry consult.  S/P Left toe amputation

## 2020-06-29 NOTE — PLAN OF CARE
Report received from aster martin. Pt able to move self from bed to wheelchair. Ok to travel off monitor to preop. Pt noted on monitor in preop to have nsr. Vital signs stable. Dr cervantes and dr kim notified heparin drip stopped at 0545. Ok to proceed with surgery a planned. Noted pt did not want anyone contacted either by text or md. He will call them after surgery. preop completed without incident.

## 2020-06-29 NOTE — INTERVAL H&P NOTE
The patient has been examined and the H&P has been reviewed:    I concur with the findings and no changes have occurred since H&P was written.    Anesthesia/Surgery risks, benefits and alternative options discussed and understood by patient/family.          Active Hospital Problems    Diagnosis  POA    *Gangrene of left foot [I96]  Yes    Diabetic ulcer of toe of left foot associated with type 2 diabetes mellitus, with necrosis of muscle [E11.621, L97.523]  Yes    Chronic troponin elevation [R79.89]  Yes     Chronic    Stage 3 chronic kidney disease [N18.3]  Yes     Chronic    PAF (paroxysmal atrial fibrillation) [I48.0]  Yes     Chronic    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, with long-term current use of insulin [E11.3299, Z79.4, E11.65]  Not Applicable     Chronic    Chronic combined systolic and diastolic HFrEF of 45% [I50.42]  Yes     Chronic    Hypertension associated with diabetes [E11.59, I10]  Yes     Chronic      Resolved Hospital Problems   No resolved problems to display.

## 2020-06-29 NOTE — ANESTHESIA POSTPROCEDURE EVALUATION
Anesthesia Post Evaluation    Patient: Crow Green    Procedure(s) Performed: Procedure(s) (LRB):  AMPUTATION, TOE (Left)    Final Anesthesia Type: MAC    Patient location during evaluation: PACU  Patient participation: Yes- Able to Participate  Level of consciousness: awake and lethargic  Post-procedure vital signs: reviewed and stable  Pain management: adequate  Airway patency: patent    PONV status at discharge: No PONV  Anesthetic complications: no      Cardiovascular status: hemodynamically stable  Respiratory status: unassisted  Hydration status: euvolemic  Follow-up not needed.          Vitals Value Taken Time   /74 06/29/20 0815   Temp 37 06/29/20 0817   Pulse 86 06/29/20 0816   Resp 9 06/29/20 0816   SpO2 99 % 06/29/20 0816   Vitals shown include unvalidated device data.      No case tracking events are documented in the log.      Pain/Raymond Score: Pain Rating Prior to Med Admin: 5 (6/29/2020  4:07 AM)  Pain Rating Post Med Admin: 0 (6/29/2020  5:07 AM)

## 2020-06-29 NOTE — PROGRESS NOTES
Subjective:     Patient ID: Crow Green is a 65 y.o. male.    Chief Complaint: Toe Pain (left great toe pain, on atb)      Crow is a 65 y.o. male admitted for gangrene of left toe. Patient was seen by his PCP on Friday 6/26/2020 and there was noticed worsening the left toe ulcer. Patient was sent to ER for IV antibiotics and further testing. Crow has a past medical history of Acute kidney injury, Arthritis, Asthma, Atrial fibrillation, Atrial fibrillation with RVR (8/7/2019), CHF (congestive heart failure), Depression, Diabetes mellitus, type 2 (Diagnosed in 2000), Elevated PSA, Hypertension, and Sleep apnea. The patient's chief complaint is left toe ulcer. The patient states he was taking the Augmentin as prescribed and dressing the toe as instructed with the Silvadene. Patient states the toe just became more and more painful. Patient states he understands the tissue is dead and needs to be cut off and is ready to proceed with the toe amputation.     History of Trauma: negative  Sign of Infection: none    Hemoglobin A1C   Date Value Ref Range Status   05/19/2020 7.7 (H) 4.0 - 5.6 % Final     Comment:     ADA Screening Guidelines:  5.7-6.4%  Consistent with prediabetes  >or=6.5%  Consistent with diabetes  High levels of fetal hemoglobin interfere with the HbA1C  assay. Heterozygous hemoglobin variants (HbS, HgC, etc)do  not significantly interfere with this assay.   However, presence of multiple variants may affect accuracy.     12/13/2019 7.5 (H) 4.0 - 5.6 % Final     Comment:     ADA Screening Guidelines:  5.7-6.4%  Consistent with prediabetes  >or=6.5%  Consistent with diabetes  High levels of fetal hemoglobin interfere with the HbA1C  assay. Heterozygous hemoglobin variants (HbS, HgC, etc)do  not significantly interfere with this assay.   However, presence of multiple variants may affect accuracy.     08/08/2019 9.7 (H) 4.0 - 5.6 % Final     Comment:     ADA Screening Guidelines:  5.7-6.4%   Consistent with prediabetes  >or=6.5%  Consistent with diabetes  High levels of fetal hemoglobin interfere with the HbA1C  assay. Heterozygous hemoglobin variants (HbS, HgC, etc)do  not significantly interfere with this assay.   However, presence of multiple variants may affect accuracy.                   Patient Active Problem List   Diagnosis    ED (erectile dysfunction)    Non-proliferative diabetic retinopathy, mild, both eyes    Hypertension associated with diabetes    Diabetic polyneuropathy    Nonobstructive coronary artery disease of native artery of native heart    Chronic combined systolic and diastolic HFrEF of 45%    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, with long-term current use of insulin    SANDRITA on CPAP    Moderate asthma    Psychophysiological insomnia    Nightmares    Sleep related gastroesophageal reflux disease    Inadequate sleep hygiene    PAF (paroxysmal atrial fibrillation)    At risk for medication noncompliance    Obesity (BMI 30.0-34.9)    Indeterminate stage chronic open angle glaucoma    Right hip pain    Gait instability    Chronic right shoulder pain    Arthritis    Left sided sciatica    Dyspnea on exertion    Osteoarthritis of spine with radiculopathy, lumbar region    HNP (herniated nucleus pulposus), lumbar    Gastroesophageal reflux disease without esophagitis    Chronic diastolic heart failure    Hypogonadism in male    Disorder of parathyroid gland    Hyperlipidemia associated with type 2 diabetes mellitus    LRTI (lower respiratory tract infection)    Physical deconditioning    Acute pain of left shoulder    Traumatic tear of left rotator cuff    Atrial fibrillation with RVR    Other chronic pain    Left shoulder pain    Complete tear of left rotator cuff    Stage 3 chronic kidney disease    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 associated  with type 2 diabetes mellitus    Lumbar radiculopathy    Diabetic ulcer of toe of left foot associated with type 2 diabetes mellitus, with necrosis of muscle    Gangrene of left foot    Ulcer of left foot    Chronic troponin elevation       Current Discharge Medication List      CONTINUE these medications which have NOT CHANGED    Details   ALCOHOL ANTISEPTIC PADS (ALCOHOL PREP PADS TOP)       allopurinoL (ZYLOPRIM) 100 MG tablet Take 1 tablet (100 mg total) by mouth once daily.  Qty: 30 tablet, Refills: 0      amoxicillin-clavulanate 875-125mg (AUGMENTIN) 875-125 mg per tablet Take 1 tablet by mouth every 12 (twelve) hours. for 10 days  Qty: 20 tablet, Refills: 0    Associated Diagnoses: Ischemic toe ulcer, left, with unspecified severity; Cellulitis of left foot      aspirin (ECOTRIN) 81 MG EC tablet Take 1 tablet (81 mg total) by mouth once daily.  Refills: 0      atorvastatin (LIPITOR) 40 MG tablet TAKE 1 TABLET EVERY EVENING  Qty: 90 tablet, Refills: 4    Associated Diagnoses: Hyperlipidemia associated with type 2 diabetes mellitus      azaTHIOprine (IMURAN) 50 mg Tab Take 1 tablet (50 mg total) by mouth once daily.  Qty: 90 tablet, Refills: 0    Associated Diagnoses: Dermatomyositis      baclofen (LIORESAL) 10 MG tablet Take 1 tablet (10 mg total) by mouth once daily.  Qty: 90 tablet, Refills: 3      blood sugar diagnostic (TRUE METRIX GLUCOSE TEST STRIP) Strp Use with blood glucose meter kit to test blood sugar four times  daily  Qty: 200 strip, Refills: 99      blood-glucose meter (TRUE METRIX AIR GLUCOSE METER) kit TEST FOUR TIMES DAILY BEFORE MEALS  AND EVERY NIGHT  Qty: 1 each, Refills: 0    Associated Diagnoses: Type 2 diabetes mellitus with mild nonproliferative retinopathy, with long-term current use of insulin, macular edema presence unspecified, unspecified laterality      colchicine (COLCRYS) 0.6 mg tablet Take 1 tablet (0.6 mg total) by mouth once daily.  Qty: 30 tablet, Refills: 0       diclofenac sodium (VOLTAREN) 1 % Gel Apply 2 g topically 4 (four) times daily.  Qty: 1 Tube, Refills: 5    Associated Diagnoses: Left shoulder pain, unspecified chronicity      diltiaZEM (CARDIZEM SR) 60 MG Cp12 Take 1 capsule (60 mg total) by mouth 2 (two) times daily.  Qty: 60 capsule, Refills: 11      ELIQUIS 5 mg Tab Take 1 tablet (5 mg total) by mouth 2 (two) times daily.  Qty: 60 tablet, Refills: 0      fluticasone-salmeterol diskus inhaler 250-50 mcg Inhale 1 puff into the lungs 2 (two) times daily. Controller  Qty: 60 each, Refills: 11    Associated Diagnoses: Moderate persistent asthma without complication      food supplemt, lactose-reduced (ENSURE) Liqd Take 118 mLs by mouth 3 (three) times daily with meals.  Qty: 1 Bottle, Refills: 4    Comments: Case of ensure  Associated Diagnoses: Physical deconditioning      furosemide (LASIX) 40 MG tablet TAKE 2 TABLETS EVERY MORNING  AND TAKE 1 TABLET EVERY EVENING  Qty: 270 tablet, Refills: 0      gabapentin (NEURONTIN) 800 MG tablet Take 1 tablet (800 mg total) by mouth 3 (three) times daily.  Qty: 270 tablet, Refills: 4    Associated Diagnoses: Osteoarthritis of spine with radiculopathy, lumbar region; HNP (herniated nucleus pulposus), lumbar      glimepiride (AMARYL) 2 MG tablet TAKE 1 TABLET (2 MG TOTAL) BY MOUTH BEFORE BREAKFAST.  Qty: 90 tablet, Refills: 3      HYDROcodone-acetaminophen (NORCO) 5-325 mg per tablet Take 1 tablet by mouth every 6 (six) hours as needed for Pain.  Qty: 9 tablet, Refills: 0    Comments: Quantity prescribed more than 7 day supply? No      inhalation spacing device Use as directed for inhalation.  Qty: 1 Device, Refills: 0      insulin (LANTUS SOLOSTAR U-100 INSULIN) glargine 100 units/mL (3mL) SubQ pen INJECT 80 UNITS SUBCUTANEOUSLY EVERY EVENING  Qty: 75 mL, Refills: 4    Associated Diagnoses: Type 2 diabetes mellitus with mild nonproliferative retinopathy, with long-term current use of insulin, macular edema presence  unspecified, unspecified laterality      insulin aspart U-100 (NOVOLOG FLEXPEN U-100 INSULIN) 100 unit/mL (3 mL) InPn pen INJECT 20 UNITS SUBCUTANEOUSLY THREE TIMES DAILY WITH MEALS  Qty: 15 mL, Refills: 0    Associated Diagnoses: Type 2 diabetes mellitus with mild nonproliferative retinopathy, with long-term current use of insulin, macular edema presence unspecified, unspecified laterality      insulin syringe-needle U-100 1 mL 31 gauge x 5/16 Syrg       ipratropium (ATROVENT) 0.02 % nebulizer solution USE 1 VIAL VIA NEBULIZER FOUR TIMES DAILY  Qty: 937.5 mL, Refills: 2    Comments: **Patient requests 90 days supply**      isosorbide mononitrate (IMDUR) 60 MG 24 hr tablet Take 1 tablet (60 mg total) by mouth once daily.  Qty: 90 tablet, Refills: 3      ketorolac (TORADOL) 10 mg tablet Take 1 tablet (10 mg total) by mouth every 8 (eight) hours as needed for Pain.  Qty: 20 tablet, Refills: 0      lancets 32 gauge Misc 1 lancet by Misc.(Non-Drug; Combo Route) route 2 (two) times daily.  Qty: 100 each, Refills: 11      latanoprost 0.005 % ophthalmic solution INSTILL 1 DROP INTO BOTH EYES EVERY EVENING  Qty: 1 Bottle, Refills: 2    Associated Diagnoses: Primary open angle glaucoma of both eyes, indeterminate stage; Primary open angle glaucoma (POAG) of both eyes, moderate stage      lisinopril 10 MG tablet Take 1 tablet (10 mg total) by mouth once daily.  Qty: 90 tablet, Refills: 3      metFORMIN (GLUCOPHAGE-XR) 500 MG XR 24hr tablet Take 2 tablets (1,000 mg total) by mouth 2 (two) times daily with meals.  Qty: 360 tablet, Refills: 3    Associated Diagnoses: Uncontrolled type 2 diabetes mellitus with hyperglycemia      methylPREDNISolone (MEDROL DOSEPACK) 4 mg tablet Take as directed on the package.  Qty: 1 Package, Refills: 0      metoprolol succinate (TOPROL-XL) 50 MG 24 hr tablet Take 1 tablet (50 mg total) by mouth once daily.  Qty: 90 tablet, Refills: 3      nitroGLYCERIN (NITROSTAT) 0.3 MG SL tablet PLACE 1 TABLET  "UNDER THE TONGUE EVERY 5 MINUTES AS NEEDED FOR CHEST PAIN, NO MORE THAN 3 TABLETS IN ONE DAY  Qty: 100 tablet, Refills: 0    Associated Diagnoses: Angina at rest      OZEMPIC 1 mg/dose (2 mg/1.5 mL) PnIj       pantoprazole (PROTONIX) 40 MG tablet Take 1 tablet (40 mg total) by mouth once daily.  Qty: 30 tablet, Refills: 11    Associated Diagnoses: Gastroesophageal reflux disease, esophagitis presence not specified      pen needle, diabetic (BD ULTRA-FINE SHORT PEN NEEDLE) 31 gauge x 5/16" Ndle Inject 1 each into the skin 3 (three) times daily.  Qty: 100 each, Refills: 11    Associated Diagnoses: Type 2 diabetes mellitus with mild nonproliferative retinopathy, with long-term current use of insulin, macular edema presence unspecified, unspecified laterality      predniSONE (DELTASONE) 5 MG tablet Take 1 tablet (5 mg total) by mouth 2 (two) times daily.  Qty: 60 tablet, Refills: 0    Associated Diagnoses: Dermatomyositis      !! sertraline (ZOLOFT) 100 MG tablet Take 1 tablet (100 mg total) by mouth every evening.  Qty: 90 tablet, Refills: 4      !! sertraline (ZOLOFT) 50 MG tablet TAKE 1 TABLET ONE TIME DAILY  Qty: 90 tablet, Refills: 3      silver sulfADIAZINE 1% (SILVADENE) 1 % cream Apply topically 2 (two) times daily.  Qty: 50 g, Refills: 0    Associated Diagnoses: Ischemic toe ulcer, left, with unspecified severity; Cellulitis of left foot      tamsulosin (FLOMAX) 0.4 mg Cap Take 1 capsule (0.4 mg total) by mouth once daily.  Qty: 30 capsule, Refills: 11    Associated Diagnoses: Benign prostatic hyperplasia with urinary hesitancy      tiotropium (SPIRIVA WITH HANDIHALER) 18 mcg inhalation capsule Inhale 1 capsule (18 mcg total) into the lungs once daily. Controller  Qty: 90 capsule, Refills: 0    Associated Diagnoses: Moderate asthma      traZODone (DESYREL) 100 MG tablet Take 2 tablets (200 mg total) by mouth every evening.  Qty: 180 tablet, Refills: 1      VIOS AEROSOL DELIVERY SYSTEM Shefali USE AS " DIRESTED  Refills: 0       !! - Potential duplicate medications found. Please discuss with provider.          Review of patient's allergies indicates:   Allergen Reactions    Protamine Hives     Urticaria, possible upper airway swelling       Past Surgical History:   Procedure Laterality Date    ABLATION OF ARRHYTHMOGENIC FOCUS FOR ATRIAL FIBRILLATION N/A 2/27/2020    Procedure: Ablation atrial fibrillation;  Surgeon: Gio Brown MD;  Location: [facility] EP LAB;  Service: Cardiology;  Laterality: N/A;  afib, DAMIEN (cx if SR), PVI, PITO, anes, MB, 3 Prep    CHOLECYSTECTOMY      SELECTIVE INJECTION OF ANESTHETIC AGENT AROUND LUMBAR SPINAL NERVE ROOT BY TRANSFORAMINAL APPROACH Left 6/11/2020    Procedure: BLOCK, SPINAL NERVE ROOT, LUMBAR, SELECTIVE, TRANSFORAMINAL APPROACH Left L4-5, L5-S1 TFESI with RN IV sedation;  Surgeon: Dillan Tsai MD;  Location: [facility] PAIN [facility]GT;  Service: Pain Management;  Laterality: Left;       Family History   Problem Relation Age of Onset    Glaucoma Mother     Cataracts Mother     Cataracts Father     Glaucoma Sister     Cataracts Sister     Glaucoma Maternal Aunt     No Known Problems Brother     No Known Problems Daughter     No Known Problems Son     Prostate cancer Neg Hx        Social History     Socioeconomic History    Marital status: Legally      Spouse name: Not on file    Number of children: 5    Years of education: Not on file    Highest education level: Not on file   Occupational History    Occupation: disabled   Social Needs    Financial resource strain: Not on file    Food insecurity     Worry: Not on file     Inability: Not on file    Transportation needs     Medical: Not on file     Non-medical: Not on file   Tobacco Use    Smoking status: Never Smoker    Smokeless tobacco: Never Used   Substance and Sexual Activity    Alcohol use: No     Comment: occ    Drug use: No    Sexual activity: Yes     Partners: Female   Lifestyle    Physical  activity     Days per week: Not on file     Minutes per session: Not on file    Stress: Rather much   Relationships    Social connections     Talks on phone: Not on file     Gets together: Not on file     Attends Congregation service: Not on file     Active member of club or organization: Not on file     Attends meetings of clubs or organizations: Not on file     Relationship status: Not on file   Other Topics Concern    Not on file   Social History Narrative    Not on file       Vitals:    06/29/20 0300 06/29/20 0324 06/29/20 0407 06/29/20 0500   BP:  (!) 112/57     Pulse: 91 93  88   Resp:  18 16    Temp:  97.5 °F (36.4 °C)     TempSrc:       SpO2:  (!) 93%     Weight:  88.5 kg (195 lb 1.7 oz)     Height:           Hemoglobin A1C   Date Value Ref Range Status   05/19/2020 7.7 (H) 4.0 - 5.6 % Final     Comment:     ADA Screening Guidelines:  5.7-6.4%  Consistent with prediabetes  >or=6.5%  Consistent with diabetes  High levels of fetal hemoglobin interfere with the HbA1C  assay. Heterozygous hemoglobin variants (HbS, HgC, etc)do  not significantly interfere with this assay.   However, presence of multiple variants may affect accuracy.     12/13/2019 7.5 (H) 4.0 - 5.6 % Final     Comment:     ADA Screening Guidelines:  5.7-6.4%  Consistent with prediabetes  >or=6.5%  Consistent with diabetes  High levels of fetal hemoglobin interfere with the HbA1C  assay. Heterozygous hemoglobin variants (HbS, HgC, etc)do  not significantly interfere with this assay.   However, presence of multiple variants may affect accuracy.     08/08/2019 9.7 (H) 4.0 - 5.6 % Final     Comment:     ADA Screening Guidelines:  5.7-6.4%  Consistent with prediabetes  >or=6.5%  Consistent with diabetes  High levels of fetal hemoglobin interfere with the HbA1C  assay. Heterozygous hemoglobin variants (HbS, HgC, etc)do  not significantly interfere with this assay.   However, presence of multiple variants may affect accuracy.         Review of Systems    Constitutional: Negative for chills and fever.   Respiratory: Negative for shortness of breath.    Cardiovascular: Negative for chest pain, palpitations, orthopnea, claudication and leg swelling.   Gastrointestinal: Negative for diarrhea, nausea and vomiting.   Musculoskeletal: Negative for joint pain.   Skin: Negative for rash.   Neurological: Positive for tingling and sensory change.   Psychiatric/Behavioral: Negative.              Objective:      PHYSICAL EXAM: Apperance: Alert and orient in no distress,well developed, and with good attention to grooming and body habits  Patient presents ambulating in tennis shoes.   Lower Extremity Exam  VASCULAR: Dorsalis pedis pulses faint left and Posterior Tibial pulses 1/4 left. Capillary fill time <4 seconds left. Mild edema observed left. Varicosities present left. Skin temperature of the lower extremities is warm to warm, proximal to distal. Hair growth dim left. (--) lymphangitis or (+) cellulitis noted left.  DERMATOLOGICAL: (+) edema, (+) erythema, (+) malodor, (+) serosanginous drainage, (+) warmth to left foot.  Ulcer noted to left hallux with increased eschar/necrotic base and with a 1 mm yellow/white border and hyperkeratosis surrounding ulcer. The dorsum surface of the feet are soft and supple.  The plantar aspects of feet are dry and scaly. Webspaces 1,2,3,4 clean, dry and without evidence of break in skin integrity left.   NEUROLOGICAL: Light touch, sharp-dull, proprioception all present and equal bilaterally.    MUSCULOSKELETAL: Muscle strength is 5/5 for foot inverters, everters, plantarflexors, and dorsiflexors. Muscle tone is normal.  Inspection/palpation of bone, joints and muscles unremarkable with the exception of that noted above.            Assessment:       Gangrene of left foot  -     Case Request Operating Room: AMPUTATION, TOE; Standing    Toe pain  -     Cancel: EKG 12-lead; Standing  -     Cancel: US Lower Extremity Arteries Left; Standing  -      Cancel: US Lower Extremity Veins Left; Standing    Shortness of breath  -     EKG 12-lead; Standing    SOB (shortness of breath)  -     X-Ray Chest 1 View; Standing    Hypertension associated with diabetes    Chronic combined systolic and diastolic HFrEF of 45%  -     Ambulatory referral/consult to Outpatient Case Management    Other orders  -     Cancel: ED Preference List Used to Initiate Sepsis Orders; Standing  -     Blood culture x two cultures. Draw prior to antibiotics.; Standing  -     Cancel: CBC auto differential; Standing  -     Comprehensive metabolic panel; Standing  -     Lactic acid, plasma #1; Standing  -     Cancel: Lactic acid, plasma #2; Standing  -     Cancel: Vital signs; Standing  -     Cancel: Bed rest; Standing  -     Cancel: Cardiac Monitoring - Adult; Standing  -     Cancel: Pulse Oximetry Continuous; Standing  -     Cancel: Strict intake and output; Standing  -     Urinalysis, Reflex to Urine Culture Urine, Clean Catch; Standing  -     Saline lock IV; Standing  -     Cancel: X-Ray Chest AP Portable; Standing  -     Cancel: X-Ray Toe 2 or More Views Left; Standing  -     Discontinue: vancomycin in dextrose 5 % 1 gram/250 mL IVPB 1,000 mg  -     Cancel: COVID-19 Routine Screening; Standing  -     Pharmacy to dose Vancomycin consult; Standing  -     vancomycin - pharmacy to dose  -     vancomycin in dextrose 5 % 1 gram/250 mL IVPB 1,000 mg  -     vancomycin 750 mg in dextrose 5 % 250 mL IVPB (ready to mix system)  -     vancomycin 500 mg in dextrose 5 % 100 mL IVPB (ready to mix system)  -     morphine injection 4 mg  -     Transfer patient; Standing  -     Vital signs; Standing  -     Progressive Mobility Protocol (mobilize patient to their highest level of functioning at least twice daily); Standing  -     Notify physician ; Standing  -     sodium chloride 0.9% flush 10 mL  -     IP VTE HIGH RISK PATIENT; Standing  -     Place sequential compression device; Standing  -     Cancel: Pulse  Oximetry Q4H; Standing  -     Full code; Standing  -     Cancel: Admit to Inpatient; Standing  -     COVID-19 Rapid Screening; Standing  -     Cancel: Troponin I; Standing  -     Cancel: CPK; Standing  -     Cancel: Protime-INR; Standing  -     Cancel: APTT; Standing  -     Cancel: D dimer, quantitative; Standing  -     Cancel: Admit to Inpatient; Standing  -     Protime-INR; Standing  -     D dimer, quantitative; Standing  -     CPK; Standing  -     APTT; Standing  -     Troponin I; Standing  -     D dimer, quantitative; Standing  -     CK; Standing  -     Protime-INR; Standing  -     APTT; Standing  -     Troponin I; Standing  -     CTA Chest Non-Coronary (PE Study); Standing  -     iohexoL (OMNIPAQUE 350) injection 100 mL  -     Admit to Inpatient; Standing  -     Cancel: Vancomycin, random; Standing  -     Vancomycin, random; Standing  -     morphine injection 4 mg  -     PFC Facilitated Transportation Request; Standing  -     Troponin I; Standing  -     Inpatient consult to Podiatry; Standing  -     Strict intake and output; Standing  -     Daily weights; Standing  -     Cardiac Monitoring - Adult; Standing  -     dextrose 50% injection 12.5 g  -     glucagon (human recombinant) injection 1 mg  -     Re-check Blood Glucose; Standing  -     POCT glucose; Standing  -     If any glucose result is less than 50 or greater than 400:; Standing  -     If 2nd result is less than 50 or greater than 400:; Standing  -     insulin aspart U-100 pen 1-10 Units  -     Cancel: Diet diabetic [facility]; 2000 Calorie; Cardiac (Low Na/Chol); Fluid - 1500mL; Standing  -     Cancel: Diet NPO Except for: Medication; Standing  -     CBC auto differential; Standing  -     Basic metabolic panel; Standing  -     Troponin I; Standing  -     CK; Standing  -     aspirin EC tablet 81 mg  -     atorvastatin tablet 40 mg  -     diltiaZEM tablet 60 mg  -     Discontinue: fluticasone-salmeterol 250-50 mcg/dose diskus inhaler 1 puff  -      furosemide tablet 80 mg  -     furosemide tablet 40 mg  -     gabapentin capsule 800 mg  -     HYDROcodone-acetaminophen 5-325 mg per tablet 1 tablet  -     Discontinue: insulin detemir U-100 pen 40 Units  -     isosorbide mononitrate 24 hr tablet 60 mg  -     latanoprost 0.005 % ophthalmic solution 1 drop  -     lisinopriL tablet 10 mg  -     metoprolol succinate (TOPROL-XL) 24 hr tablet 50 mg  -     nitroGLYCERIN SL tablet 0.4 mg  -     pantoprazole EC tablet 40 mg  -     sertraline tablet 100 mg  -     tamsulosin 24 hr capsule 0.4 mg  -     Discontinue: tiotropium inhalation capsule 18 mcg  -     traZODone tablet 200 mg  -     ipratropium 0.02 % nebulizer solution 0.5 mg  -     HYDROcodone-acetaminophen  mg per tablet 1 tablet  -     acetaminophen tablet 650 mg  -     ondansetron injection 4 mg  -     Cancel: IP VTE HIGH RISK PATIENT; Standing  -     Reason for No Pharmacological VTE Prophylaxis; Standing  -     Cancel: Draw aPTT level 6 hours after start of infusion; adjust dosage and order aPTT based on the nomogram in hyperlink; Standing  -     Cancel: Do not administer any intramuscular injections, call physician for an alternative route or medication if medication is needed; Standing  -     Cancel: If bleeding occurs, or HIT is suspected, stop heparin infusion and contact physician; Standing  -     Cancel: APTT; Standing  -     Discontinue: heparin 25,000 units in dextrose 5% 250 mL (100 units/mL) infusion LOW INTENSITY nomogram - [facility]  -     Discontinue: heparin 25,000 units in dextrose 5% (100 units/ml) IV bolus from bag - ADDITIONAL PRN BOLUS - 60 units/kg  -     Discontinue: heparin 25,000 units in dextrose 5% (100 units/ml) IV bolus from bag - ADDITIONAL PRN BOLUS - 30 units/kg  -     POCT glucose; Standing  -     arformoteroL nebulizer solution 15 mcg  -     Inhalation Treatment BID; Standing  -     budesonide nebulizer solution 0.5 mg  -     POCT glucose; Standing  -     APTT; Standing  -      vancomycin 1.25 g in dextrose 5% 250 mL IVPB (ready to mix)  -     POCT glucose; Standing  -     morphine injection 2 mg  -     Cancel: Diet Cardiac; Standing  -     Cancel: Diet NPO Except for: Medication; Standing  -     CARDIAC MONITORING STRIPS  -     Vancomycin, random; Standing  -     POCT glucose; Standing  -     POCT glucose; Standing  -     POCT glucose; Standing  -     Cancel: Diet Cardiac Thin; Standing  -     APTT; Standing  -     vancomycin 1.25 g in dextrose 5% 250 mL IVPB (ready to mix)  -     POCT glucose; Standing  -     Discontinue: cefepime in dextrose 5 % 1 gram/50 mL IVPB 1 g  -     0.9%  NaCl infusion  -     Cancel: Diet NPO Except for: Medication; Standing  -     APTT; Standing  -     insulin detemir U-100 pen 20 Units  -     Vancomycin, random; Standing  -     POCT glucose; Standing  -     POCT glucose; Standing  -     POCT glucose; Standing  -     Admit to Phase 1 PACU, transfer to Phase 2 per protocol when indicated ; Standing  -     Cancel: Vital signs; Standing  -     Discontinue: hydromorphone (PF) injection 0.2 mg  -     Discontinue: oxyCODONE-acetaminophen 5-325 mg per tablet 1 tablet  -     Discontinue: ketorolac injection 15 mg  -     Discontinue: ondansetron injection 4 mg  -     Discontinue: promethazine (PHENERGAN) 6.25 mg in dextrose 5 % 50 mL IVPB  -     Cancel: Intake and output Per protocol; Standing  -     Apply warming blanket; Standing  -     Notify Anesthesiologist; Standing  -     Notify Physician - Potential Need of Opioid Reversal; Standing  -     Oxygen Continuous; Standing  -     Pulse Oximetry Continuous; Standing  -     Discontinue: lidocaine (PF) 10 mg/ml (1%) injection  -     Discontinue: bupivacaine (PF) 0.5% (5 mg/mL) injection  -     Culture, Anaerobe; Standing  -     Aerobic culture; Standing  -     Specimen to Pathology, Surgery Other; Standing  -     nozaseptin (NOZIN) nasal   -     Transfer patient; Standing  -     X-Ray Foot 2 View Left;  Standing  -     Diet Cardiac; Standing  -     Basic metabolic panel; Standing  -     vancomycin 1.5 g in dextrose 5 % 250 mL IVPB (ready to mix)  -     VANCOMYCIN, TROUGH before next dose; Standing  -     Creatinine, serum; Standing  -     Discontinue: heparin 25,000 units in dextrose 5% (100 units/ml) IV bolus from bag - ADDITIONAL PRN BOLUS - 30 units/kg  -     Discontinue: heparin 25,000 units in dextrose 5% (100 units/ml) IV bolus from bag - ADDITIONAL PRN BOLUS - 60 units/kg  -     Discontinue: heparin 25,000 units in dextrose 5% 250 mL (100 units/mL) infusion LOW INTENSITY nomogram - [facility]  -     cefepime in dextrose 5 % 1 gram/50 mL IVPB 1 g  -     CBC auto differential; Standing  -     POCT glucose; Standing          Plan:   Gangrene of left foot  -     Case Request Operating Room: AMPUTATION, TOE; Standing    Toe pain  -     Cancel: EKG 12-lead; Standing  -     Cancel: US Lower Extremity Arteries Left; Standing  -     Cancel: US Lower Extremity Veins Left; Standing    Shortness of breath  -     EKG 12-lead; Standing    SOB (shortness of breath)  -     X-Ray Chest 1 View; Standing    Other orders  -     Cancel: ED Preference List Used to Initiate Sepsis Orders; Standing  -     Blood culture x two cultures. Draw prior to antibiotics.; Standing  -     Cancel: CBC auto differential; Standing  -     Comprehensive metabolic panel; Standing  -     Lactic acid, plasma #1; Standing  -     Cancel: Lactic acid, plasma #2; Standing  -     Cancel: Vital signs; Standing  -     Cancel: Bed rest; Standing  -     Cancel: Cardiac Monitoring - Adult; Standing  -     Cancel: Pulse Oximetry Continuous; Standing  -     Cancel: Strict intake and output; Standing  -     Urinalysis, Reflex to Urine Culture Urine, Clean Catch; Standing  -     Saline lock IV; Standing  -     Cancel: X-Ray Chest AP Portable; Standing  -     Cancel: X-Ray Toe 2 or More Views Left; Standing  -     Discontinue: vancomycin in dextrose 5 % 1 gram/250 mL IVPB  1,000 mg  -     Cancel: COVID-19 Routine Screening; Standing  -     Pharmacy to dose Vancomycin consult; Standing  -     vancomycin - pharmacy to dose  -     vancomycin in dextrose 5 % 1 gram/250 mL IVPB 1,000 mg  -     vancomycin 750 mg in dextrose 5 % 250 mL IVPB (ready to mix system)  -     vancomycin 500 mg in dextrose 5 % 100 mL IVPB (ready to mix system)  -     morphine injection 4 mg  -     Transfer patient; Standing  -     Vital signs; Standing  -     Progressive Mobility Protocol (mobilize patient to their highest level of functioning at least twice daily); Standing  -     Notify physician ; Standing  -     sodium chloride 0.9% flush 10 mL  -     IP VTE HIGH RISK PATIENT; Standing  -     Place sequential compression device; Standing  -     Pulse Oximetry Q4H; Standing  -     Full code; Standing  -     Cancel: Admit to Inpatient; Standing  -     COVID-19 Rapid Screening; Standing  -     Cancel: Troponin I; Standing  -     Cancel: CPK; Standing  -     Cancel: Protime-INR; Standing  -     Cancel: APTT; Standing  -     Cancel: D dimer, quantitative; Standing  -     Cancel: Admit to Inpatient; Standing  -     Protime-INR; Standing  -     D dimer, quantitative; Standing  -     CPK; Standing  -     APTT; Standing  -     Troponin I; Standing  -     D dimer, quantitative; Standing  -     CK; Standing  -     Protime-INR; Standing  -     APTT; Standing  -     Troponin I; Standing  -     CTA Chest Non-Coronary (PE Study); Standing  -     iohexoL (OMNIPAQUE 350) injection 100 mL  -     Admit to Inpatient; Standing  -     Cancel: Vancomycin, random; Standing  -     Vancomycin, random; Standing  -     morphine injection 4 mg  -     PFC Facilitated Transportation Request; Standing  -     Troponin I; Standing  -     Inpatient consult to Podiatry; Standing  -     Strict intake and output; Standing  -     Daily weights; Standing  -     Cardiac Monitoring - Adult; Standing  -     dextrose 50% injection 12.5 g  -     glucagon  (human recombinant) injection 1 mg  -     Re-check Blood Glucose; Standing  -     POCT glucose; Standing  -     If any glucose result is less than 50 or greater than 400:; Standing  -     If 2nd result is less than 50 or greater than 400:; Standing  -     insulin aspart U-100 pen 1-10 Units  -     Cancel: Diet diabetic [facility]; 2000 Calorie; Cardiac (Low Na/Chol); Fluid - 1500mL; Standing  -     Cancel: Diet NPO Except for: Medication; Standing  -     CBC auto differential; Standing  -     Basic metabolic panel; Standing  -     Troponin I; Standing  -     CK; Standing  -     aspirin EC tablet 81 mg  -     atorvastatin tablet 40 mg  -     diltiaZEM tablet 60 mg  -     Discontinue: fluticasone-salmeterol 250-50 mcg/dose diskus inhaler 1 puff  -     furosemide tablet 80 mg  -     furosemide tablet 40 mg  -     gabapentin capsule 800 mg  -     HYDROcodone-acetaminophen 5-325 mg per tablet 1 tablet  -     Discontinue: insulin detemir U-100 pen 40 Units  -     isosorbide mononitrate 24 hr tablet 60 mg  -     latanoprost 0.005 % ophthalmic solution 1 drop  -     lisinopriL tablet 10 mg  -     metoprolol succinate (TOPROL-XL) 24 hr tablet 50 mg  -     nitroGLYCERIN SL tablet 0.4 mg  -     pantoprazole EC tablet 40 mg  -     sertraline tablet 100 mg  -     tamsulosin 24 hr capsule 0.4 mg  -     Discontinue: tiotropium inhalation capsule 18 mcg  -     traZODone tablet 200 mg  -     ipratropium 0.02 % nebulizer solution 0.5 mg  -     HYDROcodone-acetaminophen  mg per tablet 1 tablet  -     acetaminophen tablet 650 mg  -     ondansetron injection 4 mg  -     Cancel: IP VTE HIGH RISK PATIENT; Standing  -     Reason for No Pharmacological VTE Prophylaxis; Standing  -     Draw aPTT level 6 hours after start of infusion; adjust dosage and order aPTT based on the nomogram in hyperlink; Standing  -     Do not administer any intramuscular injections, call physician for an alternative route or medication if medication is  needed; Standing  -     If bleeding occurs, or HIT is suspected, stop heparin infusion and contact physician; Standing  -     APTT; Standing  -     heparin 25,000 units in dextrose 5% 250 mL (100 units/mL) infusion LOW INTENSITY nomogram - [facility]  -     heparin 25,000 units in dextrose 5% (100 units/ml) IV bolus from bag - ADDITIONAL PRN BOLUS - 60 units/kg  -     heparin 25,000 units in dextrose 5% (100 units/ml) IV bolus from bag - ADDITIONAL PRN BOLUS - 30 units/kg  -     POCT glucose; Standing  -     arformoteroL nebulizer solution 15 mcg  -     Inhalation Treatment BID; Standing  -     budesonide nebulizer solution 0.5 mg  -     POCT glucose; Standing  -     APTT; Standing  -     vancomycin 1.25 g in dextrose 5% 250 mL IVPB (ready to mix)  -     POCT glucose; Standing  -     morphine injection 2 mg  -     Cancel: Diet Cardiac; Standing  -     Cancel: Diet NPO Except for: Medication; Standing  -     CARDIAC MONITORING STRIPS  -     Vancomycin, random; Standing  -     POCT glucose; Standing  -     POCT glucose; Standing  -     POCT glucose; Standing  -     Cancel: Diet Cardiac Thin; Standing  -     APTT; Standing  -     vancomycin 1.25 g in dextrose 5% 250 mL IVPB (ready to mix)  -     POCT glucose; Standing  -     cefepime in dextrose 5 % 1 gram/50 mL IVPB 1 g  -     0.9%  NaCl infusion  -     Diet NPO Except for: Medication; Standing  -     APTT; Standing  -     insulin detemir U-100 pen 20 Units  -     Vancomycin, random; Standing  -     POCT glucose; Standing  -     POCT glucose; Standing  -     POCT glucose; Standing      I counseled the patient on his conditions, regarding findings of my examination, my impressions, and usual treatment plan.   Patient to OR 06/29/2020 for surgical management for gangrene left hallux deformity. All concerns and questions answered by myself, Dr. Rito DPM. No gurantees given nor implied.             Barb Veras DPM  [facility]

## 2020-06-29 NOTE — TELEPHONE ENCOUNTER
----- Message from Shea Pena sent at 6/19/2020  1:57 PM CDT -----  Contact: Ms Rooney - [phone]  Would like to consult with the nurse returning the nurse call call, please call back at [phone], thanks sj

## 2020-06-29 NOTE — OP NOTE
Patient: Crow Green  : 1955  MR#: [id number]  DOS: 2020  Surgeon: Dr. Barb Veras D.P.M.  Assistant: None  Pre-Op Dx: Left Hallux Gangrene  Post Op Dx: Left Hallux Gangrene  Procedure: Left Hallux Amputation  Anesthesia: IV sedation with local anesthesia  Hemostasis: Pneumatic Ankle Tourniquet @250mmHg  EBL: 10cc  Materials: 3-0 nylon, 1/2' packing  Injectables: Pre-op: 20cc of 1:1 mixture of 1% Lidocaine plain and 0.5% Marcaine plain  Post-op: 7cc of 0.5% Marcaine plain   Specimen: Necrotic soft tissue and Bone     Procedure in detail:  The patient was brought into the operating room and placed on operating table in the supine position. Following IV sedation, local anesthesia was obtained about the patients Left 1st ray utilizing 20cc of 1:1 mixture of 1% Lidocaine plain and 0.5% Marcaine plain. The Left foot was then scrubbed, prepped, and draped in the usual aseptic manner. The left lower extremity was then raised to exsanguinate the Left foot and the pneumatic ankle tourniquet was then inflated.    Attention was then directed to the Left foot where a linear longitudinal incision over the left 1st metatarsal head continuing that incision to a racquet-shape incision around the proximal phalanx was made with blunt dissection down to bone. Next the proximal phalanx was identified and found to be soft and necrotic. A pus pocket was noted the left lateral 1st interspace. The proximal phalanx was then disarticulate at the joint, removed intoto and passed from the operative field. The 1st metatarsal head  was noted to be of good bone quality. Next utilizing a rongeur the cartilage of the 1st metatarsal head was removed and passed from operative field. The flexor and extensor tendons were both identified and pulled distally and cut as for proximally as allowed and passed from the operative field. The wound was then irrigated with sterile saline solution. Wound was then reinspected and found to  be free of all necrotic soft tissue and bone.  Next the skin over the 1st metatarsal head was reapproximated utilizing 3-0 nylon. Upon completion of the procedure a postoperative block consisting of 7cc of 0.5% Marcaine plain was injected about the incison site.   The wound was then packed with 1/2' packing and dressed with compressive dressing consisting of  betadine soaked adaptic, gauze, jewel, and ACE.    At this time, a pneumatic ankle tourniquet was then deflated and a prompt hyperemic response was noted to all toes of the Left foot. The patient tolerated anesthesia and procedure well. The patient was transported to PACU with vital signs stable and neurovascular status intact to the Left foot.

## 2020-06-29 NOTE — PROGRESS NOTES
Pharmacokinetic Assessment Follow Up: IV Vancomycin    Vancomycin serum concentration assessment(s):    The random level was drawn correctly and can be used to guide therapy at this time. The measurement is below the desired definitive target range of 15 to 20 mcg/mL.    Vancomycin Regimen Plan:    Change regimen to Vancomycin 1500 mg IV every 24 hours with next serum trough concentration measured at [address] prior to next dose on 6/30    Drug levels (last 3 results):  Recent Labs   Lab Result Units 06/27/20  0815 06/28/20  0422 06/29/20  0407   Vancomycin, Random ug/mL 11.5 15.8 14.9       Pharmacy will continue to follow and monitor vancomycin.    Please contact pharmacy at extension [phone] for questions regarding this assessment.    Thank you for the consult,   No Perla       Patient brief summary:  Crow Green is a 65 y.o. male initiated on antimicrobial therapy with IV Vancomycin for treatment of skin & soft tissue infection    The patient's current regimen is Vancomycin 1500mg Q24    Drug Allergies:   Review of patient's allergies indicates:   Allergen Reactions    Protamine Hives     Urticaria, possible upper airway swelling       Actual Body Weight:   88.5kg    Renal Function:   Estimated Creatinine Clearance: 59.5 mL/min (based on SCr of 1.3 mg/dL).,     Dialysis Method (if applicable):  N/A    CBC (last 72 hours):  Recent Labs   Lab Result Units 06/26/20  1232 06/27/20  0439 06/28/20  0422   WBC K/uL 18.95* 14.19* 14.31*   Hemoglobin g/dL 9.8* 9.7* 10.1*   Hematocrit % 31.7* 31.5* 33.1*   Platelets K/uL 265 244 165   Gran% % 80.9* 73.0 71.8   Lymph% % 11.5* 19.3 18.8   Mono% % 6.6 6.1 7.2   Eosinophil% % 0.2 1.0 1.0   Basophil% % 0.1 0.1 0.1   Differential Method  Automated Automated Automated       Metabolic Panel (last 72 hours):  Recent Labs   Lab Result Units 06/26/20  1214 06/26/20  1232 06/26/20  1313 06/26/20  1743 06/27/20  0439 06/28/20  0422   Sodium mmol/L  --  141 141  --  139  139   Potassium mmol/L  --  4.2 4.3  --  3.7 3.9   Chloride mmol/L  --  103 104  --  99 97   CO2 mmol/L  --  29 29  --  28 30*   Glucose mg/dL  --  96 76  --  127* 141*   Glucose, UA   --   --   --  Negative  --   --    BUN, Bld mg/dL  --  37* 36*  --  26* 30*   Creatinine mg/dL  --  1.4 1.4  --  1.1 1.3   Creatinine, Random Ur mg/dL 127.0  --   --   --   --   --    Albumin g/dL  --  2.7* 2.7*  --   --   --    Total Bilirubin mg/dL  --  0.1 0.2  --   --   --    Alkaline Phosphatase U/L  --  62 64  --   --   --    AST U/L  --  14 15  --   --   --    ALT U/L  --  19 19  --   --   --        Vancomycin Administrations:  vancomycin given in the last 96 hours                   vancomycin 1.25 g in dextrose 5% 250 mL IVPB (ready to mix) (mg) 1,250 mg New Bag 06/28/20 0836    vancomycin 1.25 g in dextrose 5% 250 mL IVPB (ready to mix) (mg) 1,250 mg New Bag 06/27/20 1145    vancomycin 500 mg in dextrose 5 % 100 mL IVPB (ready to mix system) (mg) 500 mg New Bag 06/26/20 1756    vancomycin 750 mg in dextrose 5 % 250 mL IVPB (ready to mix system) (mg) 750 mg New Bag 06/26/20 1645    vancomycin in dextrose 5 % 1 gram/250 mL IVPB 1,000 mg (mg) 1,000 mg New Bag 06/26/20 1445                Microbiologic Results:  Microbiology Results (last 7 days)     Procedure Component Value Units Date/Time    Aerobic culture [[id number]] Collected: 06/29/20 0759    Order Status: Sent Specimen: Abscess from Toe, Left Foot Updated: 06/29/20 0825    Culture, Anaerobe [[id number]] Collected: 06/29/20 0759    Order Status: Sent Specimen: Abscess from Toe, Left Foot Updated: 06/29/20 0825    Blood culture x two cultures. Draw prior to antibiotics. [[id number]] Collected: 06/26/20 1317    Order Status: Completed Specimen: Blood from Peripheral, Hand, Right Updated: 06/29/20 0612     Blood Culture, Routine No Growth to date      No Growth to date      No Growth to date    Narrative:      Aerobic and anaerobic    Blood culture x two cultures. Draw prior to  antibiotics. [[id number]] Collected: 06/26/20 1313    Order Status: Completed Specimen: Blood from Peripheral, Antecubital, Left Updated: 06/29/20 0612     Blood Culture, Routine No Growth to date      No Growth to date      No Growth to date    Narrative:      Aerobic and anaerobic

## 2020-06-29 NOTE — PLAN OF CARE
No falls today. Safety maintained. Plan of  care reviewed with patient. Patient expressed understanding. Patient remains NPO for procedure later this AM.

## 2020-06-29 NOTE — PROGRESS NOTES
Pharmacist Renal Dose Adjustment Note    Crow Green is a 65 y.o. male being treated with the medication Cefepime    Patient Data:    Vital Signs (Most Recent):  Temp: 97.7 °F (36.5 °C) (06/29/20 1159)  Pulse: 81 (06/29/20 1159)  Resp: 18 (06/29/20 1159)  BP: 119/67 (06/29/20 1159)  SpO2: 95 % (06/29/20 1159) Vital Signs (72h Range):  Temp:  [96.4 °F (35.8 °C)-99.9 °F (37.7 °C)]   Pulse:  []   Resp:  [14-22]   BP: ()/(50-78)   SpO2:  [92 %-100 %]      Recent Labs   Lab 06/27/20  0439 06/28/20  0422 06/29/20  1104   CREATININE 1.1 1.3 1.6*     Serum creatinine: 1.6 mg/dL (H) 06/29/20 1104  Estimated creatinine clearance: 48.3 mL/min (A)    Medication:Cefepime dose: 1 gm frequency q8h will be changed to medication:cefepime dose:1 gm frequency:q12h due to decrease in CrCl    Pharmacist's Name: Tita Bolden  Pharmacist's Extension: [phone]

## 2020-06-29 NOTE — PROGRESS NOTES
[facility] Hospital Medicine  Progress Note    Patient Name: Crow Green  MRN: [id number]  Patient Class: IP- Inpatient   Admission Date: 6/26/2020  Length of Stay: 3 days  Attending Physician: Anthony Hester, *  Primary Care Provider: Mateo Lambert DO        Subjective:     Principal Problem:Gangrene of left foot        HPI:  Mr. Green is a 66 yo male with a PMHx of DM II, HTN, HLD, SANDRITA, obesity, PAF on Eliquis, nonischemic CMPY with LVEF of 45%, nonobstructive CAD, and asthma.  He presented to the ED with c/o pain to left great toe that has progressively worsened over the past 1 week.  He was seen by Dr. Veras (Podiatry) last week and prescribed Augmentin and a foot boot.  Patient reports being compliant with antibiotics, but has seen no improvement in left great toe wound.  Associated toe swelling, redness, warmth, black color change, and chills.  The symptoms are aggravated by movement and activity; no alleviating factors.  Denies any drainage, foul odor, weakness, numbness/tingling, foot or ankle pain/swelling, CP, SOB, ABD pain, N/V/D, back pain, HA, lightheadedness, dizziness, syncope, fever or chills.  ED evaluation of left great toe was consistent with gangrene.  Labs showed WBC 18.95, 0% bands, and normal lactic.  Blood cultures were drawn in the ED and IV vanc given.  Hospital Medicine was contacted for admission.       Overview/Hospital Course:  65 y/p aam admitted with a Dx of Left foot gangrene  And  Left foot cellulitis . He was started on [employer] IVAB . Podiatrist was consulted . He had a recent arterial and  Venous LE US which did not show any acute finding .The Blood cx are NGTD.  6/28 Pt was seen and examined at bedside . He is complaining of severe pain  Of the left toe . Podiatrist  Plan for surgical resection of great toe with possible graft application/WoundVac application with Dr. Barb Veras on Monday . The blood cx are NGTD   6/29 He is s/p  Left toe amputation today . The blood cx are NGTD . He is on cefepime and vanc . We will de escalate  Tomorrow am .  He report the pain is better . There was no acute event overnight .    Interval History:     Review of Systems   Constitutional: Negative for chills, diaphoresis, fatigue and fever.   HENT: Negative for congestion, postnasal drip, rhinorrhea, sinus pressure and sore throat.    Respiratory: Negative for cough, shortness of breath and wheezing.    Cardiovascular: Negative for chest pain, palpitations and leg swelling.   Gastrointestinal: Negative for abdominal pain, diarrhea, nausea and vomiting.   Genitourinary: Negative for dysuria and hematuria.   Musculoskeletal: Positive for arthralgias (left great toe). Negative for back pain and myalgias.   Skin: Positive for color change (left great toe) and wound (left great toe). Negative for pallor and rash.   Neurological: Negative for dizziness, syncope, weakness, light-headedness, numbness and headaches.   All other systems reviewed and are negative.    Objective:     Vital Signs (Most Recent):  Temp: 97.7 °F (36.5 °C) (06/29/20 1159)  Pulse: 78 (06/29/20 1300)  Resp: 20 (06/29/20 1241)  BP: 119/67 (06/29/20 1159)  SpO2: (!) 93 % (06/29/20 1241) Vital Signs (24h Range):  Temp:  [97.5 °F (36.4 °C)-99.9 °F (37.7 °C)] 97.7 °F (36.5 °C)  Pulse:  [76-97] 78  Resp:  [14-22] 20  SpO2:  [92 %-100 %] 93 %  BP: ()/(50-78) 119/67     Weight: 88.5 kg (195 lb 1.7 oz)  Body mass index is 32.47 kg/m².    Intake/Output Summary (Last 24 hours) at 6/29/2020 1415  Last data filed at 6/29/2020 0756  Gross per 24 hour   Intake 2053.33 ml   Output 900 ml   Net 1153.33 ml      Physical Exam  Vitals signs and nursing note reviewed.   Constitutional:       General: He is not in acute distress.     Appearance: He is well-developed. He is not diaphoretic.   HENT:      Head: Normocephalic and atraumatic.   Eyes:      Conjunctiva/sclera: Conjunctivae normal.   Neck:       Musculoskeletal: Normal range of motion and neck supple.   Cardiovascular:      Rate and Rhythm: Normal rate and regular rhythm.      Pulses:           Dorsalis pedis pulses are 2+ on the right side and detected w/ Doppler on the left side.        Posterior tibial pulses are 2+ on the right side and 2+ on the left side.      Heart sounds: S1 normal and S2 normal. No murmur.   Pulmonary:      Effort: Pulmonary effort is normal. No tachypnea, accessory muscle usage or respiratory distress.      Breath sounds: No wheezing, rhonchi or rales.   Abdominal:      General: Bowel sounds are normal. There is no distension.      Palpations: Abdomen is soft.      Tenderness: There is no abdominal tenderness.   Musculoskeletal: Normal range of motion.   Feet:      Left foot:      Skin integrity: Ulcer, skin breakdown, erythema and warmth present.      Comments: Left toe amputation . Covered .  Skin:     General: Skin is warm.      Capillary Refill: Capillary refill takes less than 2 seconds.      Findings: No rash.   Neurological:      Mental Status: He is alert and oriented to person, place, and time.   Psychiatric:         Behavior: Behavior normal.         Significant Labs: All pertinent labs within the past 24 hours have been reviewed.    Significant Imaging: I have reviewed all pertinent imaging results/findings within the past 24 hours.      Assessment/Plan:      * Gangrene of left foot  - Continue empiric IV vanc.  Pharmacy consult for dosing.  - Analgesics as needed.  - Podiatry consult.  S/P Left toe amputation     Chronic troponin elevation  - Troponin stable at baseline.  No CP or anginal equivalent.  EKG unrevealing.  - Patient with known nonobstructive CAD per [facility] in 2016.  - Trend serial cardiac enzymes.  - Continue ASA, BB, and statin.    Diabetic ulcer of toe of left foot associated with type 2 diabetes mellitus, with necrosis of muscle  - Cont IVAB   -Podiatrist consulted . S/P Left toe amputation     Stage 3  chronic kidney disease  - Creatinine stable at baseline.  Avoid nephrotoxic agents.  Follow daily labs.    PAF (paroxysmal atrial fibrillation)  - Monitor telemetry.  - Continue home Toprol XL and diltiazem.   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  - Decrease  basal insulin  To 20  units nightly. Pt will  Be npo after midnight   - AccuChecks with moderate dose SSI.  - Diabetic diet.  - Recent HbA1c of 7.7.    Chronic combined systolic and diastolic HFrEF of 45%  - Clinically compensated on admission.  - Continue home diuretics, BB, and ACEi.  - Strict I&O's, daily weights, Na/fluid restriction.     Hypertension associated with diabetes  - BP stable.  Continue home lisinopril, Imdur, Toprol XL, diltiazem, and Lasix.      VTE Risk Mitigation (From admission, onward)         Ordered     IP VTE HIGH RISK PATIENT  Once      06/26/20 2324     Reason for No Pharmacological VTE Prophylaxis  Once     Question:  Reasons:  Answer:  Already adequately anticoagulated on oral Anticoagulants    06/26/20 2217     Place sequential compression device  Until discontinued      06/26/20 2213                      Anthony Hester MD  Department of Hospital Medicine   [facility]

## 2020-06-30 LAB
ANION GAP SERPL CALC-SCNC: 12 MMOL/L (ref 8–16)
BASOPHILS # BLD AUTO: 0.03 K/UL (ref 0–0.2)
BASOPHILS NFR BLD: 0.2 % (ref 0–1.9)
BUN SERPL-MCNC: 28 MG/DL (ref 8–23)
CALCIUM SERPL-MCNC: 8.9 MG/DL (ref 8.7–10.5)
CHLORIDE SERPL-SCNC: 99 MMOL/L (ref 95–110)
CO2 SERPL-SCNC: 28 MMOL/L (ref 23–29)
CREAT SERPL-MCNC: 1.3 MG/DL (ref 0.5–1.4)
DIFFERENTIAL METHOD: ABNORMAL
EOSINOPHIL # BLD AUTO: 0.3 K/UL (ref 0–0.5)
EOSINOPHIL NFR BLD: 1.9 % (ref 0–8)
ERYTHROCYTE [DISTWIDTH] IN BLOOD BY AUTOMATED COUNT: 14.6 % (ref 11.5–14.5)
EST. GFR  (AFRICAN AMERICAN): >60 ML/MIN/1.73 M^2
EST. GFR  (NON AFRICAN AMERICAN): 58 ML/MIN/1.73 M^2
GLUCOSE SERPL-MCNC: 165 MG/DL (ref 70–110)
HCT VFR BLD AUTO: 30.3 % (ref 40–54)
HGB BLD-MCNC: 9.1 G/DL (ref 14–18)
IMM GRANULOCYTES # BLD AUTO: 0.21 K/UL (ref 0–0.04)
IMM GRANULOCYTES NFR BLD AUTO: 1.3 % (ref 0–0.5)
LYMPHOCYTES # BLD AUTO: 2.2 K/UL (ref 1–4.8)
LYMPHOCYTES NFR BLD: 14.2 % (ref 18–48)
MCH RBC QN AUTO: 26.7 PG (ref 27–31)
MCHC RBC AUTO-ENTMCNC: 30 G/DL (ref 32–36)
MCV RBC AUTO: 89 FL (ref 82–98)
MONOCYTES # BLD AUTO: 0.9 K/UL (ref 0.3–1)
MONOCYTES NFR BLD: 6 % (ref 4–15)
NEUTROPHILS # BLD AUTO: 12 K/UL (ref 1.8–7.7)
NEUTROPHILS NFR BLD: 76.4 % (ref 38–73)
NRBC BLD-RTO: 0 /100 WBC
PLATELET # BLD AUTO: 242 K/UL (ref 150–350)
PMV BLD AUTO: 10.3 FL (ref 9.2–12.9)
POCT GLUCOSE: 153 MG/DL (ref 70–110)
POCT GLUCOSE: 293 MG/DL (ref 70–110)
POCT GLUCOSE: 296 MG/DL (ref 70–110)
POCT GLUCOSE: 322 MG/DL (ref 70–110)
POCT GLUCOSE: 324 MG/DL (ref 70–110)
POTASSIUM SERPL-SCNC: 4.3 MMOL/L (ref 3.5–5.1)
RBC # BLD AUTO: 3.41 M/UL (ref 4.6–6.2)
SODIUM SERPL-SCNC: 139 MMOL/L (ref 136–145)
VANCOMYCIN SERPL-MCNC: 10.9 UG/ML
WBC # BLD AUTO: 15.72 K/UL (ref 3.9–12.7)

## 2020-06-30 PROCEDURE — 27000221 HC OXYGEN, UP TO 24 HOURS: Mod: HCNC

## 2020-06-30 PROCEDURE — 21400001 HC TELEMETRY ROOM: Mod: HCNC

## 2020-06-30 PROCEDURE — C9399 UNCLASSIFIED DRUGS OR BIOLOG: HCPCS | Mod: HCNC | Performed by: PODIATRIST

## 2020-06-30 PROCEDURE — 85025 COMPLETE CBC W/AUTO DIFF WBC: CPT | Mod: HCNC

## 2020-06-30 PROCEDURE — 63600175 PHARM REV CODE 636 W HCPCS: Mod: HCNC | Performed by: PODIATRIST

## 2020-06-30 PROCEDURE — 25000242 PHARM REV CODE 250 ALT 637 W/ HCPCS: Mod: HCNC | Performed by: PODIATRIST

## 2020-06-30 PROCEDURE — 80048 BASIC METABOLIC PNL TOTAL CA: CPT | Mod: HCNC

## 2020-06-30 PROCEDURE — 80202 ASSAY OF VANCOMYCIN: CPT | Mod: HCNC

## 2020-06-30 PROCEDURE — 36415 COLL VENOUS BLD VENIPUNCTURE: CPT | Mod: HCNC

## 2020-06-30 PROCEDURE — G0179 MD RECERTIFICATION HHA PT: HCPCS | Mod: ,,, | Performed by: FAMILY MEDICINE

## 2020-06-30 PROCEDURE — 63600175 PHARM REV CODE 636 W HCPCS: Mod: HCNC | Performed by: INTERNAL MEDICINE

## 2020-06-30 PROCEDURE — 94761 N-INVAS EAR/PLS OXIMETRY MLT: CPT | Mod: HCNC

## 2020-06-30 PROCEDURE — 94640 AIRWAY INHALATION TREATMENT: CPT | Mod: HCNC

## 2020-06-30 PROCEDURE — 25000003 PHARM REV CODE 250: Mod: HCNC | Performed by: INTERNAL MEDICINE

## 2020-06-30 PROCEDURE — 25000003 PHARM REV CODE 250: Mod: HCNC | Performed by: PODIATRIST

## 2020-06-30 PROCEDURE — G0179 PR HOME HEALTH MD RECERTIFICATION: ICD-10-PCS | Mod: ,,, | Performed by: FAMILY MEDICINE

## 2020-06-30 RX ORDER — GLUCAGON 1 MG
1 KIT INJECTION
Status: DISCONTINUED | OUTPATIENT
Start: 2020-06-30 | End: 2020-07-03 | Stop reason: HOSPADM

## 2020-06-30 RX ORDER — INSULIN ASPART 100 [IU]/ML
1-10 INJECTION, SOLUTION INTRAVENOUS; SUBCUTANEOUS
Status: DISCONTINUED | OUTPATIENT
Start: 2020-06-30 | End: 2020-07-03 | Stop reason: HOSPADM

## 2020-06-30 RX ORDER — IBUPROFEN 200 MG
16 TABLET ORAL
Status: DISCONTINUED | OUTPATIENT
Start: 2020-06-30 | End: 2020-07-03 | Stop reason: HOSPADM

## 2020-06-30 RX ORDER — SODIUM CHLORIDE 9 MG/ML
INJECTION, SOLUTION INTRAVENOUS CONTINUOUS
Status: DISCONTINUED | OUTPATIENT
Start: 2020-06-30 | End: 2020-07-01

## 2020-06-30 RX ORDER — IBUPROFEN 200 MG
24 TABLET ORAL
Status: DISCONTINUED | OUTPATIENT
Start: 2020-06-30 | End: 2020-07-03 | Stop reason: HOSPADM

## 2020-06-30 RX ADMIN — FUROSEMIDE 80 MG: 80 TABLET ORAL at 06:06

## 2020-06-30 RX ADMIN — INSULIN ASPART 8 UNITS: 100 INJECTION, SOLUTION INTRAVENOUS; SUBCUTANEOUS at 02:06

## 2020-06-30 RX ADMIN — MORPHINE SULFATE 2 MG: 2 INJECTION, SOLUTION INTRAMUSCULAR; INTRAVENOUS at 06:06

## 2020-06-30 RX ADMIN — VANCOMYCIN HYDROCHLORIDE 1500 MG: 1.5 INJECTION, POWDER, LYOPHILIZED, FOR SOLUTION INTRAVENOUS at 03:06

## 2020-06-30 RX ADMIN — ASPIRIN 81 MG: 81 TABLET, COATED ORAL at 10:06

## 2020-06-30 RX ADMIN — PANTOPRAZOLE SODIUM 40 MG: 40 TABLET, DELAYED RELEASE ORAL at 10:06

## 2020-06-30 RX ADMIN — HYDROCODONE BITARTRATE AND ACETAMINOPHEN 1 TABLET: 10; 325 TABLET ORAL at 05:06

## 2020-06-30 RX ADMIN — CEFEPIME HYDROCHLORIDE 1 G: 1 INJECTION, SOLUTION INTRAVENOUS at 05:06

## 2020-06-30 RX ADMIN — BUDESONIDE 0.5 MG: 0.5 SUSPENSION RESPIRATORY (INHALATION) at 08:06

## 2020-06-30 RX ADMIN — LATANOPROST 1 DROP: 50 SOLUTION OPHTHALMIC at 09:06

## 2020-06-30 RX ADMIN — SODIUM CHLORIDE: 0.9 INJECTION, SOLUTION INTRAVENOUS at 12:06

## 2020-06-30 RX ADMIN — IPRATROPIUM BROMIDE 0.5 MG: 0.5 SOLUTION RESPIRATORY (INHALATION) at 08:06

## 2020-06-30 RX ADMIN — ATORVASTATIN CALCIUM 40 MG: 40 TABLET, FILM COATED ORAL at 08:06

## 2020-06-30 RX ADMIN — IPRATROPIUM BROMIDE 0.5 MG: 0.5 SOLUTION RESPIRATORY (INHALATION) at 12:06

## 2020-06-30 RX ADMIN — GABAPENTIN 800 MG: 400 CAPSULE ORAL at 08:06

## 2020-06-30 RX ADMIN — ARFORMOTEROL TARTRATE 15 MCG: 15 SOLUTION RESPIRATORY (INHALATION) at 07:06

## 2020-06-30 RX ADMIN — INSULIN ASPART 2 UNITS: 100 INJECTION, SOLUTION INTRAVENOUS; SUBCUTANEOUS at 05:06

## 2020-06-30 RX ADMIN — METOPROLOL SUCCINATE 50 MG: 50 TABLET, EXTENDED RELEASE ORAL at 09:06

## 2020-06-30 RX ADMIN — GABAPENTIN 800 MG: 400 CAPSULE ORAL at 02:06

## 2020-06-30 RX ADMIN — IPRATROPIUM BROMIDE 0.5 MG: 0.5 SOLUTION RESPIRATORY (INHALATION) at 07:06

## 2020-06-30 RX ADMIN — SERTRALINE HYDROCHLORIDE 100 MG: 50 TABLET ORAL at 08:06

## 2020-06-30 RX ADMIN — GABAPENTIN 800 MG: 400 CAPSULE ORAL at 10:06

## 2020-06-30 RX ADMIN — BUDESONIDE 0.5 MG: 0.5 SUSPENSION RESPIRATORY (INHALATION) at 07:06

## 2020-06-30 RX ADMIN — IPRATROPIUM BROMIDE 0.5 MG: 0.5 SOLUTION RESPIRATORY (INHALATION) at 02:06

## 2020-06-30 RX ADMIN — INSULIN ASPART 3 UNITS: 100 INJECTION, SOLUTION INTRAVENOUS; SUBCUTANEOUS at 08:06

## 2020-06-30 RX ADMIN — IPRATROPIUM BROMIDE 0.5 MG: 0.5 SOLUTION RESPIRATORY (INHALATION) at 11:06

## 2020-06-30 RX ADMIN — HYDROCODONE BITARTRATE AND ACETAMINOPHEN 1 TABLET: 10; 325 TABLET ORAL at 08:06

## 2020-06-30 RX ADMIN — TAMSULOSIN HYDROCHLORIDE 0.4 MG: 0.4 CAPSULE ORAL at 10:06

## 2020-06-30 RX ADMIN — MORPHINE SULFATE 2 MG: 2 INJECTION, SOLUTION INTRAMUSCULAR; INTRAVENOUS at 09:06

## 2020-06-30 RX ADMIN — INSULIN DETEMIR 20 UNITS: 100 INJECTION, SOLUTION SUBCUTANEOUS at 08:06

## 2020-06-30 RX ADMIN — ARFORMOTEROL TARTRATE 15 MCG: 15 SOLUTION RESPIRATORY (INHALATION) at 08:06

## 2020-06-30 RX ADMIN — DILTIAZEM HYDROCHLORIDE 60 MG: 60 TABLET, FILM COATED ORAL at 08:06

## 2020-06-30 NOTE — ASSESSMENT & PLAN NOTE
- Troponin stable at baseline.  No CP or anginal equivalent.  EKG unrevealing.  - Patient with known nonobstructive CAD per [facility] in 2016.  - Continue ASA, and statin.

## 2020-06-30 NOTE — PROGRESS NOTES
Pharmacokinetic Assessment Follow Up: IV Vancomycin    Vancomycin serum concentration assessment(s):    The trough level was drawn incorrectly and cannot be used to guide therapy at this time.    Vancomycin Regimen Plan:    Continue regimen to Vancomycin 1500 mg IV every 24 hours with next serum trough concentration measured at 1430 prior to next dose on 7/1    Drug levels (last 3 results):  Recent Labs   Lab Result Units 06/28/20  0422 06/29/20  0407 06/30/20  1353   Vancomycin, Random ug/mL 15.8 14.9 10.9       Pharmacy will continue to follow and monitor vancomycin.    Please contact pharmacy at extension [phone] for questions regarding this assessment.    Thank you for the consult,   No Perla       Patient brief summary:  Crow Green is a 65 y.o. male initiated on antimicrobial therapy with IV Vancomycin for treatment of skin & soft tissue infection    The patient's current regimen is Vancomycin 1500mg Q24    Drug Allergies:   Review of patient's allergies indicates:   Allergen Reactions    Protamine Hives     Urticaria, possible upper airway swelling       Actual Body Weight:   88kg    Renal Function:   Estimated Creatinine Clearance: 59.3 mL/min (based on SCr of 1.3 mg/dL).,     Dialysis Method (if applicable):  N/A    CBC (last 72 hours):  Recent Labs   Lab Result Units 06/28/20  0422 06/30/20  0413   WBC K/uL 14.31* 15.72*   Hemoglobin g/dL 10.1* 9.1*   Hematocrit % 33.1* 30.3*   Platelets K/uL 165 242   Gran% % 71.8 76.4*   Lymph% % 18.8 14.2*   Mono% % 7.2 6.0   Eosinophil% % 1.0 1.9   Basophil% % 0.1 0.2   Differential Method  Automated Automated       Metabolic Panel (last 72 hours):  Recent Labs   Lab Result Units 06/28/20  0422 06/29/20  1104 06/30/20  0413   Sodium mmol/L 139  --  139   Potassium mmol/L 3.9  --  4.3   Chloride mmol/L 97  --  99   CO2 mmol/L 30*  --  28   Glucose mg/dL 141*  --  165*   BUN, Bld mg/dL 30*  --  28*   Creatinine mg/dL 1.3 1.6* 1.3       Vancomycin  Administrations:  vancomycin given in the last 96 hours                   vancomycin 1.5 g in dextrose 5 % 250 mL IVPB (ready to mix) (mg) 1,500 mg New Bag 06/29/20 1035    vancomycin 1.25 g in dextrose 5% 250 mL IVPB (ready to mix) (mg) 1,250 mg New Bag 06/28/20 0836    vancomycin 1.25 g in dextrose 5% 250 mL IVPB (ready to mix) (mg) 1,250 mg New Bag 06/27/20 1145    vancomycin 500 mg in dextrose 5 % 100 mL IVPB (ready to mix system) (mg) 500 mg New Bag 06/26/20 1756    vancomycin 750 mg in dextrose 5 % 250 mL IVPB (ready to mix system) (mg) 750 mg New Bag 06/26/20 1645    vancomycin in dextrose 5 % 1 gram/250 mL IVPB 1,000 mg (mg) 1,000 mg New Bag 06/26/20 1445                Microbiologic Results:  Microbiology Results (last 7 days)     Procedure Component Value Units Date/Time    Aerobic culture [[id number]]  (Abnormal) Collected: 06/29/20 0759    Order Status: Completed Specimen: Abscess from Toe, Left Foot Updated: 06/30/20 0844     Aerobic Bacterial Culture STAPHYLOCOCCUS AUREUS  Moderate  Susceptibility pending      Culture, Anaerobe [[id number]] Collected: 06/29/20 0759    Order Status: Completed Specimen: Abscess from Toe, Left Foot Updated: 06/30/20 0731     Anaerobic Culture Culture in progress     Comment: Previous comment was modified by CSK at 07:30 on 06/30/2020 No   anaerobes isolated         Blood culture x two cultures. Draw prior to antibiotics. [[id number]] Collected: 06/26/20 1313    Order Status: Completed Specimen: Blood from Peripheral, Antecubital, Left Updated: 06/30/20 0612     Blood Culture, Routine No Growth to date      No Growth to date      No Growth to date      No Growth to date    Narrative:      Aerobic and anaerobic    Blood culture x two cultures. Draw prior to antibiotics. [[id number]] Collected: 06/26/20 1317    Order Status: Completed Specimen: Blood from Peripheral, Hand, Right Updated: 06/30/20 0612     Blood Culture, Routine No Growth to date      No Growth to date      No  Growth to date      No Growth to date    Narrative:      Aerobic and anaerobic

## 2020-06-30 NOTE — ASSESSMENT & PLAN NOTE
- Monitor telemetry.  - Continue home Toprol XL and diltiazem.   - D/C heparin drip  -Resume elequis tomorrow  After closure

## 2020-06-30 NOTE — SUBJECTIVE & OBJECTIVE
Interval History:     Review of Systems   Constitutional: Negative for chills, diaphoresis, fatigue and fever.   HENT: Negative for congestion, postnasal drip, rhinorrhea, sinus pressure and sore throat.    Respiratory: Negative for cough, shortness of breath and wheezing.    Cardiovascular: Negative for chest pain, palpitations and leg swelling.   Gastrointestinal: Negative for abdominal pain, diarrhea, nausea and vomiting.   Genitourinary: Negative for dysuria and hematuria.   Musculoskeletal: Positive for arthralgias. Negative for back pain and myalgias.   Skin: Positive for wound. Negative for color change, pallor and rash.   Neurological: Negative for dizziness, syncope, weakness, light-headedness, numbness and headaches.   All other systems reviewed and are negative.    Objective:     Vital Signs (Most Recent):  Temp: 98.6 °F (37 °C) (06/30/20 1113)  Pulse: 82 (06/30/20 1113)  Resp: 18 (06/30/20 1113)  BP: 121/71 (06/30/20 1113)  SpO2: 95 % (06/30/20 1113) Vital Signs (24h Range):  Temp:  [97.6 °F (36.4 °C)-98.8 °F (37.1 °C)] 98.6 °F (37 °C)  Pulse:  [80-96] 82  Resp:  [16-22] 18  SpO2:  [90 %-98 %] 95 %  BP: ()/(50-71) 121/71     Weight: 88 kg (194 lb 0.1 oz)  Body mass index is 32.28 kg/m².    Intake/Output Summary (Last 24 hours) at 6/30/2020 1413  Last data filed at 6/30/2020 0500  Gross per 24 hour   Intake 360 ml   Output 875 ml   Net -515 ml      Physical Exam  Vitals signs and nursing note reviewed.   Constitutional:       General: He is not in acute distress.     Appearance: He is well-developed. He is not diaphoretic.   HENT:      Head: Normocephalic and atraumatic.   Eyes:      Conjunctiva/sclera: Conjunctivae normal.   Neck:      Musculoskeletal: Normal range of motion and neck supple.   Cardiovascular:      Rate and Rhythm: Normal rate and regular rhythm.      Pulses:           Dorsalis pedis pulses are 2+ on the right side and detected w/ Doppler on the left side.        Posterior tibial  pulses are 2+ on the right side and 2+ on the left side.      Heart sounds: S1 normal and S2 normal. No murmur.   Pulmonary:      Effort: Pulmonary effort is normal. No tachypnea, accessory muscle usage or respiratory distress.      Breath sounds: No wheezing, rhonchi or rales.   Abdominal:      General: Bowel sounds are normal. There is no distension.      Palpations: Abdomen is soft.      Tenderness: There is no abdominal tenderness.   Musculoskeletal: Normal range of motion.   Feet:      Left foot:      Skin integrity: No ulcer, skin breakdown, erythema or warmth.      Comments: Left toe amputation . Covered .  Skin:     General: Skin is warm.      Capillary Refill: Capillary refill takes less than 2 seconds.      Findings: No rash.   Neurological:      Mental Status: He is alert and oriented to person, place, and time.   Psychiatric:         Behavior: Behavior normal.         Significant Labs: All pertinent labs within the past 24 hours have been reviewed.    Significant Imaging: I have reviewed all pertinent imaging results/findings within the past 24 hours.

## 2020-06-30 NOTE — PLAN OF CARE
Plan of care reviewed with patient. Patient verbalized understanding. NSR on monitor. No acute distress noted. Side rails x 2, bed in lowest position, call bell within reach, pt advised to call for assistance. Maintain bed alarm for patient safety.

## 2020-06-30 NOTE — PLAN OF CARE
Pt remains fall free this shift.  Pt AAOx4, verbal, clear speech.  Skin warm and dry. No new skin issues. Left Foot wrapped with surgical dressing.  Telemonitoring in progress, 80s-90 SR  2L O2 via NC  Urinal and up to bathroom  Assist x1 with transfers.  CBG q 6 hrs with PRN SS insulin.   Bed low, side rails up x 2, wheels locked, call light in reach.   Bed alarm maintained for safety.   Patient instructed to call for assistance.   Hourly rounding completed.   24 hour chart check completed  POC updated and reviewed with pt. Will continue POC.

## 2020-06-30 NOTE — PROGRESS NOTES
Subjective:     Patient ID: Crow Green is a 65 y.o. male.    Chief Complaint: Toe Pain (left great toe pain, on atb)    HPI: This 65 year old male seen at bedside 1 days status post left hallux amputation procedure. Patient has no complaints of fever chills or sweats. Positive decreased pain. Patient states dressing was kept dry, clean, and intact. Patient does still complain of pain in the left lower leg when he hangs the leg down on the side of the bed.        Patient Active Problem List   Diagnosis    ED (erectile dysfunction)    Non-proliferative diabetic retinopathy, mild, both eyes    Hypertension associated with diabetes    Diabetic polyneuropathy    Nonobstructive coronary artery disease of native artery of native heart    Chronic combined systolic and diastolic HFrEF of 45%    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, with long-term current use of insulin    SANDRITA on CPAP    Moderate asthma    Psychophysiological insomnia    Nightmares    Sleep related gastroesophageal reflux disease    Inadequate sleep hygiene    PAF (paroxysmal atrial fibrillation)    At risk for medication noncompliance    Obesity (BMI 30.0-34.9)    Indeterminate stage chronic open angle glaucoma    Right hip pain    Gait instability    Chronic right shoulder pain    Arthritis    Left sided sciatica    Dyspnea on exertion    Osteoarthritis of spine with radiculopathy, lumbar region    HNP (herniated nucleus pulposus), lumbar    Gastroesophageal reflux disease without esophagitis    Chronic diastolic heart failure    Hypogonadism in male    Disorder of parathyroid gland    Hyperlipidemia associated with type 2 diabetes mellitus    LRTI (lower respiratory tract infection)    Physical deconditioning    Acute pain of left shoulder    Traumatic tear of left rotator cuff    Atrial fibrillation with RVR    Other chronic pain    Left shoulder  pain    Complete tear of left rotator cuff    Stage 3 chronic kidney disease    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 associated with type 2 diabetes mellitus    Lumbar radiculopathy    Diabetic ulcer of toe of left foot associated with type 2 diabetes mellitus, with necrosis of muscle    Gangrene of left foot    Ulcer of left foot    Chronic troponin elevation       Current Discharge Medication List      CONTINUE these medications which have NOT CHANGED    Details   metoprolol succinate (TOPROL-XL) 50 MG 24 hr tablet Take 1 tablet (50 mg total) by mouth once daily.  Qty: 90 tablet, Refills: 3      ALCOHOL ANTISEPTIC PADS (ALCOHOL PREP PADS TOP)       allopurinoL (ZYLOPRIM) 100 MG tablet Take 1 tablet (100 mg total) by mouth once daily.  Qty: 30 tablet, Refills: 0      aspirin (ECOTRIN) 81 MG EC tablet Take 1 tablet (81 mg total) by mouth once daily.  Refills: 0      atorvastatin (LIPITOR) 40 MG tablet TAKE 1 TABLET EVERY EVENING  Qty: 90 tablet, Refills: 4    Associated Diagnoses: Hyperlipidemia associated with type 2 diabetes mellitus      azaTHIOprine (IMURAN) 50 mg Tab Take 1 tablet (50 mg total) by mouth once daily.  Qty: 90 tablet, Refills: 0    Associated Diagnoses: Dermatomyositis      baclofen (LIORESAL) 10 MG tablet Take 1 tablet (10 mg total) by mouth once daily.  Qty: 90 tablet, Refills: 3      blood sugar diagnostic (TRUE METRIX GLUCOSE TEST STRIP) Strp Use with blood glucose meter kit to test blood sugar four times  daily  Qty: 200 strip, Refills: 99      blood-glucose meter (TRUE METRIX AIR GLUCOSE METER) kit TEST FOUR TIMES DAILY BEFORE MEALS  AND EVERY NIGHT  Qty: 1 each, Refills: 0    Associated Diagnoses: Type 2 diabetes mellitus with mild nonproliferative retinopathy, with long-term current use of insulin, macular edema presence unspecified, unspecified laterality      colchicine (COLCRYS) 0.6 mg tablet Take 1 tablet (0.6 mg total) by mouth once  daily.  Qty: 30 tablet, Refills: 0      diclofenac sodium (VOLTAREN) 1 % Gel Apply 2 g topically 4 (four) times daily.  Qty: 1 Tube, Refills: 5    Associated Diagnoses: Left shoulder pain, unspecified chronicity      diltiaZEM (CARDIZEM SR) 60 MG Cp12 Take 1 capsule (60 mg total) by mouth 2 (two) times daily.  Qty: 60 capsule, Refills: 11      ELIQUIS 5 mg Tab Take 1 tablet (5 mg total) by mouth 2 (two) times daily.  Qty: 60 tablet, Refills: 0      fluticasone-salmeterol diskus inhaler 250-50 mcg Inhale 1 puff into the lungs 2 (two) times daily. Controller  Qty: 60 each, Refills: 11    Associated Diagnoses: Moderate persistent asthma without complication      food supplemt, lactose-reduced (ENSURE) Liqd Take 118 mLs by mouth 3 (three) times daily with meals.  Qty: 1 Bottle, Refills: 4    Comments: Case of ensure  Associated Diagnoses: Physical deconditioning      furosemide (LASIX) 40 MG tablet TAKE 2 TABLETS EVERY MORNING  AND TAKE 1 TABLET EVERY EVENING  Qty: 270 tablet, Refills: 0      gabapentin (NEURONTIN) 800 MG tablet Take 1 tablet (800 mg total) by mouth 3 (three) times daily.  Qty: 270 tablet, Refills: 4    Associated Diagnoses: Osteoarthritis of spine with radiculopathy, lumbar region; HNP (herniated nucleus pulposus), lumbar      glimepiride (AMARYL) 2 MG tablet TAKE 1 TABLET (2 MG TOTAL) BY MOUTH BEFORE BREAKFAST.  Qty: 90 tablet, Refills: 3      HYDROcodone-acetaminophen (NORCO) 5-325 mg per tablet Take 1 tablet by mouth every 6 (six) hours as needed for Pain.  Qty: 9 tablet, Refills: 0    Comments: Quantity prescribed more than 7 day supply? No      inhalation spacing device Use as directed for inhalation.  Qty: 1 Device, Refills: 0      insulin (LANTUS SOLOSTAR U-100 INSULIN) glargine 100 units/mL (3mL) SubQ pen INJECT 80 UNITS SUBCUTANEOUSLY EVERY EVENING  Qty: 75 mL, Refills: 4    Associated Diagnoses: Type 2 diabetes mellitus with mild nonproliferative retinopathy, with long-term current use of  insulin, macular edema presence unspecified, unspecified laterality      insulin aspart U-100 (NOVOLOG FLEXPEN U-100 INSULIN) 100 unit/mL (3 mL) InPn pen INJECT 20 UNITS SUBCUTANEOUSLY THREE TIMES DAILY WITH MEALS  Qty: 15 mL, Refills: 0    Associated Diagnoses: Type 2 diabetes mellitus with mild nonproliferative retinopathy, with long-term current use of insulin, macular edema presence unspecified, unspecified laterality      insulin syringe-needle U-100 1 mL 31 gauge x 5/16 Syrg       ipratropium (ATROVENT) 0.02 % nebulizer solution USE 1 VIAL VIA NEBULIZER FOUR TIMES DAILY  Qty: 937.5 mL, Refills: 2    Comments: **Patient requests 90 days supply**      isosorbide mononitrate (IMDUR) 60 MG 24 hr tablet Take 1 tablet (60 mg total) by mouth once daily.  Qty: 90 tablet, Refills: 3      ketorolac (TORADOL) 10 mg tablet Take 1 tablet (10 mg total) by mouth every 8 (eight) hours as needed for Pain.  Qty: 20 tablet, Refills: 0      lancets 32 gauge Misc 1 lancet by Misc.(Non-Drug; Combo Route) route 2 (two) times daily.  Qty: 100 each, Refills: 11      latanoprost 0.005 % ophthalmic solution INSTILL 1 DROP INTO BOTH EYES EVERY EVENING  Qty: 1 Bottle, Refills: 2    Associated Diagnoses: Primary open angle glaucoma of both eyes, indeterminate stage; Primary open angle glaucoma (POAG) of both eyes, moderate stage      lisinopril 10 MG tablet Take 1 tablet (10 mg total) by mouth once daily.  Qty: 90 tablet, Refills: 3      metFORMIN (GLUCOPHAGE-XR) 500 MG XR 24hr tablet Take 2 tablets (1,000 mg total) by mouth 2 (two) times daily with meals.  Qty: 360 tablet, Refills: 3    Associated Diagnoses: Uncontrolled type 2 diabetes mellitus with hyperglycemia      methylPREDNISolone (MEDROL DOSEPACK) 4 mg tablet Take as directed on the package.  Qty: 1 Package, Refills: 0      nitroGLYCERIN (NITROSTAT) 0.3 MG SL tablet PLACE 1 TABLET UNDER THE TONGUE EVERY 5 MINUTES AS NEEDED FOR CHEST PAIN, NO MORE THAN 3 TABLETS IN ONE DAY  Qty:  "100 tablet, Refills: 0    Associated Diagnoses: Angina at rest      OZEMPIC 1 mg/dose (2 mg/1.5 mL) PnIj       pantoprazole (PROTONIX) 40 MG tablet Take 1 tablet (40 mg total) by mouth once daily.  Qty: 30 tablet, Refills: 11    Associated Diagnoses: Gastroesophageal reflux disease, esophagitis presence not specified      pen needle, diabetic (BD ULTRA-FINE SHORT PEN NEEDLE) 31 gauge x 5/16" Ndle Inject 1 each into the skin 3 (three) times daily.  Qty: 100 each, Refills: 11    Associated Diagnoses: Type 2 diabetes mellitus with mild nonproliferative retinopathy, with long-term current use of insulin, macular edema presence unspecified, unspecified laterality      predniSONE (DELTASONE) 5 MG tablet Take 1 tablet (5 mg total) by mouth 2 (two) times daily.  Qty: 60 tablet, Refills: 0    Associated Diagnoses: Dermatomyositis      !! sertraline (ZOLOFT) 100 MG tablet Take 1 tablet (100 mg total) by mouth every evening.  Qty: 90 tablet, Refills: 4      !! sertraline (ZOLOFT) 50 MG tablet TAKE 1 TABLET ONE TIME DAILY  Qty: 90 tablet, Refills: 3      silver sulfADIAZINE 1% (SILVADENE) 1 % cream Apply topically 2 (two) times daily.  Qty: 50 g, Refills: 0    Associated Diagnoses: Ischemic toe ulcer, left, with unspecified severity; Cellulitis of left foot      tamsulosin (FLOMAX) 0.4 mg Cap Take 1 capsule (0.4 mg total) by mouth once daily.  Qty: 30 capsule, Refills: 11    Associated Diagnoses: Benign prostatic hyperplasia with urinary hesitancy      tiotropium (SPIRIVA WITH HANDIHALER) 18 mcg inhalation capsule Inhale 1 capsule (18 mcg total) into the lungs once daily. Controller  Qty: 90 capsule, Refills: 0    Associated Diagnoses: Moderate asthma      traZODone (DESYREL) 100 MG tablet Take 2 tablets (200 mg total) by mouth every evening.  Qty: 180 tablet, Refills: 1      VIOS AEROSOL DELIVERY SYSTEM Shefali USE AS DIRESTED  Refills: 0       !! - Potential duplicate medications found. Please discuss with provider.      STOP " taking these medications       amoxicillin-clavulanate 875-125mg (AUGMENTIN) 875-125 mg per tablet Comments:   Reason for Stopping:               Review of patient's allergies indicates:   Allergen Reactions    Protamine Hives     Urticaria, possible upper airway swelling       Past Surgical History:   Procedure Laterality Date    ABLATION OF ARRHYTHMOGENIC FOCUS FOR ATRIAL FIBRILLATION N/A 2/27/2020    Procedure: Ablation atrial fibrillation;  Surgeon: Gio Brown MD;  Location: [facility] EP LAB;  Service: Cardiology;  Laterality: N/A;  afib, DAMIEN (cx if SR), PVI, PITO, anes, MB, 3 Prep    CHOLECYSTECTOMY      SELECTIVE INJECTION OF ANESTHETIC AGENT AROUND LUMBAR SPINAL NERVE ROOT BY TRANSFORAMINAL APPROACH Left 6/11/2020    Procedure: BLOCK, SPINAL NERVE ROOT, LUMBAR, SELECTIVE, TRANSFORAMINAL APPROACH Left L4-5, L5-S1 TFESI with RN IV sedation;  Surgeon: Dillan Tsai MD;  Location: [facility] PAIN [facility]GT;  Service: Pain Management;  Laterality: Left;       Family History   Problem Relation Age of Onset    Glaucoma Mother     Cataracts Mother     Cataracts Father     Glaucoma Sister     Cataracts Sister     Glaucoma Maternal Aunt     No Known Problems Brother     No Known Problems Daughter     No Known Problems Son     Prostate cancer Neg Hx        Social History     Socioeconomic History    Marital status: Legally      Spouse name: Not on file    Number of children: 5    Years of education: Not on file    Highest education level: Not on file   Occupational History    Occupation: disabled   Social Needs    Financial resource strain: Not on file    Food insecurity     Worry: Not on file     Inability: Not on file    Transportation needs     Medical: Not on file     Non-medical: Not on file   Tobacco Use    Smoking status: Never Smoker    Smokeless tobacco: Never Used   Substance and Sexual Activity    Alcohol use: No     Comment: occ    Drug use: No    Sexual activity: Yes     Partners:  Female   Lifestyle    Physical activity     Days per week: Not on file     Minutes per session: Not on file    Stress: Rather much   Relationships    Social connections     Talks on phone: Not on file     Gets together: Not on file     Attends Confucianism service: Not on file     Active member of club or organization: Not on file     Attends meetings of clubs or organizations: Not on file     Relationship status: Not on file   Other Topics Concern    Not on file   Social History Narrative    Not on file       Vitals:    06/30/20 0800 06/30/20 0838 06/30/20 0842 06/30/20 0845   BP: 96/62   98/68   Pulse: 83 88 84    Resp: 18 16 16    Temp: 97.6 °F (36.4 °C)      TempSrc: Oral      SpO2:  95% (!) 94%    Weight:       Height:           Review of Systems   Constitutional: Negative for chills and fever.   Respiratory: Negative for shortness of breath.    Cardiovascular: Positive for claudication. Negative for chest pain, palpitations, orthopnea and leg swelling.   Gastrointestinal: Negative for diarrhea, nausea and vomiting.   Musculoskeletal: Negative for joint pain.   Skin: Negative for rash.   Neurological: Positive for tingling and sensory change.   Psychiatric/Behavioral: Negative.              Objective:      Physical examination: General: Patient is in no acute distress, alert and oriented x 3.  Dressing to left foot clean, dry, and intact.   Patient seen at bedside resting.   Lower Extremity Exam:  Vascular: Dorsalis pedis and Posterior tibial pulses palpable on left foot.  Capillary fill time <5 sec to toes on left foot. none edema noted on left foot.   Dermatologic: Sutures Intact. Open wound clean with adequate wound bed and edges.Negative erythema, drainage, or increased temp noted to surgical site.   Neurological: Light touch sensation intact to left foot.   Musculoskeletal: Positive pain on palpation/ROM of left foot. Left hallux open amputation noted.           Assessment:       Gangrene of left foot  -      Case Request Operating Room: AMPUTATION, TOE; Standing  -     Case Request Operating Room: CLOSURE, WOUND; Standing    Toe pain  -     Cancel: EKG 12-lead; Standing  -     Cancel: US Lower Extremity Arteries Left; Standing  -     Cancel: US Lower Extremity Veins Left; Standing    Shortness of breath  -     EKG 12-lead; Standing    SOB (shortness of breath)  -     X-Ray Chest 1 View; Standing    Hypertension associated with diabetes    Chronic combined systolic and diastolic HFrEF of 45%  -     Ambulatory referral/consult to Outpatient Case Management    Other orders  -     Cancel: ED Preference List Used to Initiate Sepsis Orders; Standing  -     Blood culture x two cultures. Draw prior to antibiotics.; Standing  -     Cancel: CBC auto differential; Standing  -     Comprehensive metabolic panel; Standing  -     Lactic acid, plasma #1; Standing  -     Cancel: Lactic acid, plasma #2; Standing  -     Cancel: Vital signs; Standing  -     Cancel: Bed rest; Standing  -     Cancel: Cardiac Monitoring - Adult; Standing  -     Cancel: Pulse Oximetry Continuous; Standing  -     Cancel: Strict intake and output; Standing  -     Urinalysis, Reflex to Urine Culture Urine, Clean Catch; Standing  -     Saline lock IV; Standing  -     Cancel: X-Ray Chest AP Portable; Standing  -     Cancel: X-Ray Toe 2 or More Views Left; Standing  -     Discontinue: vancomycin in dextrose 5 % 1 gram/250 mL IVPB 1,000 mg  -     Cancel: COVID-19 Routine Screening; Standing  -     Pharmacy to dose Vancomycin consult; Standing  -     vancomycin - pharmacy to dose  -     vancomycin in dextrose 5 % 1 gram/250 mL IVPB 1,000 mg  -     vancomycin 750 mg in dextrose 5 % 250 mL IVPB (ready to mix system)  -     vancomycin 500 mg in dextrose 5 % 100 mL IVPB (ready to mix system)  -     morphine injection 4 mg  -     Transfer patient; Standing  -     Vital signs; Standing  -     Progressive Mobility Protocol (mobilize patient to their highest level of  functioning at least twice daily); Standing  -     Notify physician ; Standing  -     sodium chloride 0.9% flush 10 mL  -     IP VTE HIGH RISK PATIENT; Standing  -     Place sequential compression device; Standing  -     Cancel: Pulse Oximetry Q4H; Standing  -     Full code; Standing  -     Cancel: Admit to Inpatient; Standing  -     COVID-19 Rapid Screening; Standing  -     Cancel: Troponin I; Standing  -     Cancel: CPK; Standing  -     Cancel: Protime-INR; Standing  -     Cancel: APTT; Standing  -     Cancel: D dimer, quantitative; Standing  -     Cancel: Admit to Inpatient; Standing  -     Protime-INR; Standing  -     D dimer, quantitative; Standing  -     CPK; Standing  -     APTT; Standing  -     Troponin I; Standing  -     D dimer, quantitative; Standing  -     CK; Standing  -     Protime-INR; Standing  -     APTT; Standing  -     Troponin I; Standing  -     CTA Chest Non-Coronary (PE Study); Standing  -     iohexoL (OMNIPAQUE 350) injection 100 mL  -     Admit to Inpatient; Standing  -     Cancel: Vancomycin, random; Standing  -     Vancomycin, random; Standing  -     morphine injection 4 mg  -     PFC Facilitated Transportation Request; Standing  -     Troponin I; Standing  -     Inpatient consult to Podiatry; Standing  -     Strict intake and output; Standing  -     Daily weights; Standing  -     Cardiac Monitoring - Adult; Standing  -     dextrose 50% injection 12.5 g  -     glucagon (human recombinant) injection 1 mg  -     Re-check Blood Glucose; Standing  -     POCT glucose; Standing  -     If any glucose result is less than 50 or greater than 400:; Standing  -     If 2nd result is less than 50 or greater than 400:; Standing  -     insulin aspart U-100 pen 1-10 Units  -     Cancel: Diet diabetic [facility]; 2000 Calorie; Cardiac (Low Na/Chol); Fluid - 1500mL; Standing  -     Cancel: Diet NPO Except for: Medication; Standing  -     CBC auto differential; Standing  -     Basic metabolic panel;  Standing  -     Troponin I; Standing  -     CK; Standing  -     aspirin EC tablet 81 mg  -     atorvastatin tablet 40 mg  -     diltiaZEM tablet 60 mg  -     Discontinue: fluticasone-salmeterol 250-50 mcg/dose diskus inhaler 1 puff  -     furosemide tablet 80 mg  -     furosemide tablet 40 mg  -     gabapentin capsule 800 mg  -     HYDROcodone-acetaminophen 5-325 mg per tablet 1 tablet  -     Discontinue: insulin detemir U-100 pen 40 Units  -     isosorbide mononitrate 24 hr tablet 60 mg  -     latanoprost 0.005 % ophthalmic solution 1 drop  -     lisinopriL tablet 10 mg  -     metoprolol succinate (TOPROL-XL) 24 hr tablet 50 mg  -     nitroGLYCERIN SL tablet 0.4 mg  -     pantoprazole EC tablet 40 mg  -     sertraline tablet 100 mg  -     tamsulosin 24 hr capsule 0.4 mg  -     Discontinue: tiotropium inhalation capsule 18 mcg  -     traZODone tablet 200 mg  -     ipratropium 0.02 % nebulizer solution 0.5 mg  -     HYDROcodone-acetaminophen  mg per tablet 1 tablet  -     acetaminophen tablet 650 mg  -     ondansetron injection 4 mg  -     Cancel: IP VTE HIGH RISK PATIENT; Standing  -     Reason for No Pharmacological VTE Prophylaxis; Standing  -     Cancel: Draw aPTT level 6 hours after start of infusion; adjust dosage and order aPTT based on the nomogram in hyperlink; Standing  -     Cancel: Do not administer any intramuscular injections, call physician for an alternative route or medication if medication is needed; Standing  -     Cancel: If bleeding occurs, or HIT is suspected, stop heparin infusion and contact physician; Standing  -     Cancel: APTT; Standing  -     Discontinue: heparin 25,000 units in dextrose 5% 250 mL (100 units/mL) infusion LOW INTENSITY nomogram - [facility]  -     Discontinue: heparin 25,000 units in dextrose 5% (100 units/ml) IV bolus from bag - ADDITIONAL PRN BOLUS - 60 units/kg  -     Discontinue: heparin 25,000 units in dextrose 5% (100 units/ml) IV bolus from bag - ADDITIONAL PRN BOLUS  - 30 units/kg  -     POCT glucose; Standing  -     arformoteroL nebulizer solution 15 mcg  -     Inhalation Treatment BID; Standing  -     budesonide nebulizer solution 0.5 mg  -     POCT glucose; Standing  -     APTT; Standing  -     vancomycin 1.25 g in dextrose 5% 250 mL IVPB (ready to mix)  -     POCT glucose; Standing  -     morphine injection 2 mg  -     Cancel: Diet Cardiac; Standing  -     Cancel: Diet NPO Except for: Medication; Standing  -     CARDIAC MONITORING STRIPS  -     Vancomycin, random; Standing  -     POCT glucose; Standing  -     POCT glucose; Standing  -     POCT glucose; Standing  -     Cancel: Diet Cardiac Thin; Standing  -     APTT; Standing  -     vancomycin 1.25 g in dextrose 5% 250 mL IVPB (ready to mix)  -     POCT glucose; Standing  -     Discontinue: cefepime in dextrose 5 % 1 gram/50 mL IVPB 1 g  -     0.9%  NaCl infusion  -     Cancel: Diet NPO Except for: Medication; Standing  -     APTT; Standing  -     insulin detemir U-100 pen 20 Units  -     Vancomycin, random; Standing  -     POCT glucose; Standing  -     POCT glucose; Standing  -     POCT glucose; Standing  -     Admit to Phase 1 PACU, transfer to Phase 2 per protocol when indicated ; Standing  -     Cancel: Vital signs; Standing  -     Discontinue: hydromorphone (PF) injection 0.2 mg  -     Discontinue: oxyCODONE-acetaminophen 5-325 mg per tablet 1 tablet  -     Discontinue: ketorolac injection 15 mg  -     Discontinue: ondansetron injection 4 mg  -     Discontinue: promethazine (PHENERGAN) 6.25 mg in dextrose 5 % 50 mL IVPB  -     Cancel: Intake and output Per protocol; Standing  -     Apply warming blanket; Standing  -     Notify Anesthesiologist; Standing  -     Notify Physician - Potential Need of Opioid Reversal; Standing  -     Oxygen Continuous; Standing  -     Pulse Oximetry Continuous; Standing  -     Discontinue: lidocaine (PF) 10 mg/ml (1%) injection  -     Discontinue: bupivacaine (PF) 0.5% (5 mg/mL) injection  -      Culture, Anaerobe; Standing  -     Aerobic culture; Standing  -     Specimen to Pathology, Surgery Other; Standing  -     nozaseptin (NOZIN) nasal   -     Transfer patient; Standing  -     X-Ray Foot 2 View Left; Standing  -     Diet Cardiac; Standing  -     Basic metabolic panel; Standing  -     vancomycin 1.5 g in dextrose 5 % 250 mL IVPB (ready to mix)  -     VANCOMYCIN, TROUGH before next dose; Standing  -     Creatinine, serum; Standing  -     Discontinue: heparin 25,000 units in dextrose 5% (100 units/ml) IV bolus from bag - ADDITIONAL PRN BOLUS - 30 units/kg  -     Discontinue: heparin 25,000 units in dextrose 5% (100 units/ml) IV bolus from bag - ADDITIONAL PRN BOLUS - 60 units/kg  -     Discontinue: heparin 25,000 units in dextrose 5% 250 mL (100 units/mL) infusion LOW INTENSITY nomogram - [facility]  -     cefepime in dextrose 5 % 1 gram/50 mL IVPB 1 g  -     CBC auto differential; Standing  -     POCT glucose; Standing  -     POCT glucose; Standing  -     POCT glucose; Standing  -     Diet NPO Except for: Medication; Standing  -     Inpatient consult to Vascular Surgery; Standing            Plan:   Gangrene of left foot  -     Case Request Operating Room: AMPUTATION, TOE; Standing  -     Case Request Operating Room: CLOSURE, WOUND; Standing    Toe pain  -     Cancel: EKG 12-lead; Standing  -     Cancel: US Lower Extremity Arteries Left; Standing  -     Cancel: US Lower Extremity Veins Left; Standing    Shortness of breath  -     EKG 12-lead; Standing    SOB (shortness of breath)  -     X-Ray Chest 1 View; Standing    Hypertension associated with diabetes    Chronic combined systolic and diastolic HFrEF of 45%  -     Ambulatory referral/consult to Outpatient Case Management    Other orders  -     Cancel: ED Preference List Used to Initiate Sepsis Orders; Standing  -     Blood culture x two cultures. Draw prior to antibiotics.; Standing  -     Cancel: CBC auto differential; Standing  -      Comprehensive metabolic panel; Standing  -     Lactic acid, plasma #1; Standing  -     Cancel: Lactic acid, plasma #2; Standing  -     Cancel: Vital signs; Standing  -     Cancel: Bed rest; Standing  -     Cancel: Cardiac Monitoring - Adult; Standing  -     Cancel: Pulse Oximetry Continuous; Standing  -     Cancel: Strict intake and output; Standing  -     Urinalysis, Reflex to Urine Culture Urine, Clean Catch; Standing  -     Saline lock IV; Standing  -     Cancel: X-Ray Chest AP Portable; Standing  -     Cancel: X-Ray Toe 2 or More Views Left; Standing  -     Discontinue: vancomycin in dextrose 5 % 1 gram/250 mL IVPB 1,000 mg  -     Cancel: COVID-19 Routine Screening; Standing  -     Pharmacy to dose Vancomycin consult; Standing  -     vancomycin - pharmacy to dose  -     vancomycin in dextrose 5 % 1 gram/250 mL IVPB 1,000 mg  -     vancomycin 750 mg in dextrose 5 % 250 mL IVPB (ready to mix system)  -     vancomycin 500 mg in dextrose 5 % 100 mL IVPB (ready to mix system)  -     morphine injection 4 mg  -     Transfer patient; Standing  -     Vital signs; Standing  -     Progressive Mobility Protocol (mobilize patient to their highest level of functioning at least twice daily); Standing  -     Notify physician ; Standing  -     sodium chloride 0.9% flush 10 mL  -     IP VTE HIGH RISK PATIENT; Standing  -     Place sequential compression device; Standing  -     Cancel: Pulse Oximetry Q4H; Standing  -     Full code; Standing  -     Cancel: Admit to Inpatient; Standing  -     COVID-19 Rapid Screening; Standing  -     Cancel: Troponin I; Standing  -     Cancel: CPK; Standing  -     Cancel: Protime-INR; Standing  -     Cancel: APTT; Standing  -     Cancel: D dimer, quantitative; Standing  -     Cancel: Admit to Inpatient; Standing  -     Protime-INR; Standing  -     D dimer, quantitative; Standing  -     CPK; Standing  -     APTT; Standing  -     Troponin I; Standing  -     D dimer, quantitative; Standing  -     CK;  Standing  -     Protime-INR; Standing  -     APTT; Standing  -     Troponin I; Standing  -     CTA Chest Non-Coronary (PE Study); Standing  -     iohexoL (OMNIPAQUE 350) injection 100 mL  -     Admit to Inpatient; Standing  -     Cancel: Vancomycin, random; Standing  -     Vancomycin, random; Standing  -     morphine injection 4 mg  -     PFC Facilitated Transportation Request; Standing  -     Troponin I; Standing  -     Inpatient consult to Podiatry; Standing  -     Strict intake and output; Standing  -     Daily weights; Standing  -     Cardiac Monitoring - Adult; Standing  -     dextrose 50% injection 12.5 g  -     glucagon (human recombinant) injection 1 mg  -     Re-check Blood Glucose; Standing  -     POCT glucose; Standing  -     If any glucose result is less than 50 or greater than 400:; Standing  -     If 2nd result is less than 50 or greater than 400:; Standing  -     insulin aspart U-100 pen 1-10 Units  -     Cancel: Diet diabetic [facility]; 2000 Calorie; Cardiac (Low Na/Chol); Fluid - 1500mL; Standing  -     Cancel: Diet NPO Except for: Medication; Standing  -     CBC auto differential; Standing  -     Basic metabolic panel; Standing  -     Troponin I; Standing  -     CK; Standing  -     aspirin EC tablet 81 mg  -     atorvastatin tablet 40 mg  -     diltiaZEM tablet 60 mg  -     Discontinue: fluticasone-salmeterol 250-50 mcg/dose diskus inhaler 1 puff  -     furosemide tablet 80 mg  -     furosemide tablet 40 mg  -     gabapentin capsule 800 mg  -     HYDROcodone-acetaminophen 5-325 mg per tablet 1 tablet  -     Discontinue: insulin detemir U-100 pen 40 Units  -     isosorbide mononitrate 24 hr tablet 60 mg  -     latanoprost 0.005 % ophthalmic solution 1 drop  -     lisinopriL tablet 10 mg  -     metoprolol succinate (TOPROL-XL) 24 hr tablet 50 mg  -     nitroGLYCERIN SL tablet 0.4 mg  -     pantoprazole EC tablet 40 mg  -     sertraline tablet 100 mg  -     tamsulosin 24 hr capsule 0.4 mg  -      Discontinue: tiotropium inhalation capsule 18 mcg  -     traZODone tablet 200 mg  -     ipratropium 0.02 % nebulizer solution 0.5 mg  -     HYDROcodone-acetaminophen  mg per tablet 1 tablet  -     acetaminophen tablet 650 mg  -     ondansetron injection 4 mg  -     Cancel: IP VTE HIGH RISK PATIENT; Standing  -     Reason for No Pharmacological VTE Prophylaxis; Standing  -     Cancel: Draw aPTT level 6 hours after start of infusion; adjust dosage and order aPTT based on the nomogram in hyperlink; Standing  -     Cancel: Do not administer any intramuscular injections, call physician for an alternative route or medication if medication is needed; Standing  -     Cancel: If bleeding occurs, or HIT is suspected, stop heparin infusion and contact physician; Standing  -     Cancel: APTT; Standing  -     Discontinue: heparin 25,000 units in dextrose 5% 250 mL (100 units/mL) infusion LOW INTENSITY nomogram - [facility]  -     Discontinue: heparin 25,000 units in dextrose 5% (100 units/ml) IV bolus from bag - ADDITIONAL PRN BOLUS - 60 units/kg  -     Discontinue: heparin 25,000 units in dextrose 5% (100 units/ml) IV bolus from bag - ADDITIONAL PRN BOLUS - 30 units/kg  -     POCT glucose; Standing  -     arformoteroL nebulizer solution 15 mcg  -     Inhalation Treatment BID; Standing  -     budesonide nebulizer solution 0.5 mg  -     POCT glucose; Standing  -     APTT; Standing  -     vancomycin 1.25 g in dextrose 5% 250 mL IVPB (ready to mix)  -     POCT glucose; Standing  -     morphine injection 2 mg  -     Cancel: Diet Cardiac; Standing  -     Cancel: Diet NPO Except for: Medication; Standing  -     CARDIAC MONITORING STRIPS  -     Vancomycin, random; Standing  -     POCT glucose; Standing  -     POCT glucose; Standing  -     POCT glucose; Standing  -     Cancel: Diet Cardiac Thin; Standing  -     APTT; Standing  -     vancomycin 1.25 g in dextrose 5% 250 mL IVPB (ready to mix)  -     POCT glucose; Standing  -      Discontinue: cefepime in dextrose 5 % 1 gram/50 mL IVPB 1 g  -     0.9%  NaCl infusion  -     Cancel: Diet NPO Except for: Medication; Standing  -     APTT; Standing  -     insulin detemir U-100 pen 20 Units  -     Vancomycin, random; Standing  -     POCT glucose; Standing  -     POCT glucose; Standing  -     POCT glucose; Standing  -     Admit to Phase 1 PACU, transfer to Phase 2 per protocol when indicated ; Standing  -     Cancel: Vital signs; Standing  -     Discontinue: hydromorphone (PF) injection 0.2 mg  -     Discontinue: oxyCODONE-acetaminophen 5-325 mg per tablet 1 tablet  -     Discontinue: ketorolac injection 15 mg  -     Discontinue: ondansetron injection 4 mg  -     Discontinue: promethazine (PHENERGAN) 6.25 mg in dextrose 5 % 50 mL IVPB  -     Cancel: Intake and output Per protocol; Standing  -     Apply warming blanket; Standing  -     Notify Anesthesiologist; Standing  -     Notify Physician - Potential Need of Opioid Reversal; Standing  -     Oxygen Continuous; Standing  -     Pulse Oximetry Continuous; Standing  -     Discontinue: lidocaine (PF) 10 mg/ml (1%) injection  -     Discontinue: bupivacaine (PF) 0.5% (5 mg/mL) injection  -     Culture, Anaerobe; Standing  -     Aerobic culture; Standing  -     Specimen to Pathology, Surgery Other; Standing  -     nozaseptin (NOZIN) nasal   -     Transfer patient; Standing  -     X-Ray Foot 2 View Left; Standing  -     Diet Cardiac; Standing  -     Basic metabolic panel; Standing  -     vancomycin 1.5 g in dextrose 5 % 250 mL IVPB (ready to mix)  -     VANCOMYCIN, TROUGH before next dose; Standing  -     Creatinine, serum; Standing  -     Discontinue: heparin 25,000 units in dextrose 5% (100 units/ml) IV bolus from bag - ADDITIONAL PRN BOLUS - 30 units/kg  -     Discontinue: heparin 25,000 units in dextrose 5% (100 units/ml) IV bolus from bag - ADDITIONAL PRN BOLUS - 60 units/kg  -     Discontinue: heparin 25,000 units in dextrose 5% 250 mL (100  units/mL) infusion LOW INTENSITY nomogram - [facility]  -     cefepime in dextrose 5 % 1 gram/50 mL IVPB 1 g  -     CBC auto differential; Standing  -     POCT glucose; Standing  -     POCT glucose; Standing  -     POCT glucose; Standing  -     Diet NPO Except for: Medication; Standing  -     Inpatient consult to Vascular Surgery; Standing      I counseled the patient on his conditions, regarding findings of my examination, my impressions, and usual treatment plan.   Left foot open wound flushed with 50cc of sterile saline and  dressed with wet to dry and, gauze, kerlix, and ACE.   Plan for secondary wound closure in the AM tomorrow.   Vascular consulted for left lower leg claudication.   Podiatry to continue to follow.           Barb Veras DPM  [facility]

## 2020-06-30 NOTE — PROGRESS NOTES
[facility] Hospital Medicine  Progress Note    Patient Name: Crow Green  MRN: [id number]  Patient Class: IP- Inpatient   Admission Date: 6/26/2020  Length of Stay: 4 days  Attending Physician: Anthony Hester, *  Primary Care Provider: Mateo Lambert DO        Subjective:     Principal Problem:Gangrene of left foot        HPI:  Mr. Green is a 64 yo male with a PMHx of DM II, HTN, HLD, SANDRITA, obesity, PAF on Eliquis, nonischemic CMPY with LVEF of 45%, nonobstructive CAD, and asthma.  He presented to the ED with c/o pain to left great toe that has progressively worsened over the past 1 week.  He was seen by Dr. Veras (Podiatry) last week and prescribed Augmentin and a foot boot.  Patient reports being compliant with antibiotics, but has seen no improvement in left great toe wound.  Associated toe swelling, redness, warmth, black color change, and chills.  The symptoms are aggravated by movement and activity; no alleviating factors.  Denies any drainage, foul odor, weakness, numbness/tingling, foot or ankle pain/swelling, CP, SOB, ABD pain, N/V/D, back pain, HA, lightheadedness, dizziness, syncope, fever or chills.  ED evaluation of left great toe was consistent with gangrene.  Labs showed WBC 18.95, 0% bands, and normal lactic.  Blood cultures were drawn in the ED and IV vanc given.  Hospital Medicine was contacted for admission.       Overview/Hospital Course:  65 y/p aam admitted with a Dx of Left foot gangrene  And  Left foot cellulitis . He was started on [employer] IVAB . Podiatrist was consulted . He had a recent arterial and  Venous LE US which did not show any acute finding .The Blood cx are NGTD.  6/28 Pt was seen and examined at bedside . He is complaining of severe pain  Of the left toe . Podiatrist  Plan for surgical resection of great toe with possible graft application/WoundVac application with Dr. Barb Veras on Monday . The blood cx are NGTD   6/29 He is s/p  Left toe amputation today . The blood cx are NGTD . He is on cefepime and vanc . We will de escalate  Tomorrow am .  He report the pain is better . There was no acute event overnight .  6/30 He is s/p Left Hallux Amputation . The wound cx is (+) for Staph . He will be taken to the OR tomorrow  For closure .  The BP has been borderline low .       Interval History:     Review of Systems   Constitutional: Negative for chills, diaphoresis, fatigue and fever.   HENT: Negative for congestion, postnasal drip, rhinorrhea, sinus pressure and sore throat.    Respiratory: Negative for cough, shortness of breath and wheezing.    Cardiovascular: Negative for chest pain, palpitations and leg swelling.   Gastrointestinal: Negative for abdominal pain, diarrhea, nausea and vomiting.   Genitourinary: Negative for dysuria and hematuria.   Musculoskeletal: Positive for arthralgias. Negative for back pain and myalgias.   Skin: Positive for wound. Negative for color change, pallor and rash.   Neurological: Negative for dizziness, syncope, weakness, light-headedness, numbness and headaches.   All other systems reviewed and are negative.    Objective:     Vital Signs (Most Recent):  Temp: 98.6 °F (37 °C) (06/30/20 1113)  Pulse: 82 (06/30/20 1113)  Resp: 18 (06/30/20 1113)  BP: 121/71 (06/30/20 1113)  SpO2: 95 % (06/30/20 1113) Vital Signs (24h Range):  Temp:  [97.6 °F (36.4 °C)-98.8 °F (37.1 °C)] 98.6 °F (37 °C)  Pulse:  [80-96] 82  Resp:  [16-22] 18  SpO2:  [90 %-98 %] 95 %  BP: ()/(50-71) 121/71     Weight: 88 kg (194 lb 0.1 oz)  Body mass index is 32.28 kg/m².    Intake/Output Summary (Last 24 hours) at 6/30/2020 1413  Last data filed at 6/30/2020 0500  Gross per 24 hour   Intake 360 ml   Output 875 ml   Net -515 ml      Physical Exam  Vitals signs and nursing note reviewed.   Constitutional:       General: He is not in acute distress.     Appearance: He is well-developed. He is not diaphoretic.   HENT:      Head: Normocephalic  and atraumatic.   Eyes:      Conjunctiva/sclera: Conjunctivae normal.   Neck:      Musculoskeletal: Normal range of motion and neck supple.   Cardiovascular:      Rate and Rhythm: Normal rate and regular rhythm.      Pulses:           Dorsalis pedis pulses are 2+ on the right side and detected w/ Doppler on the left side.        Posterior tibial pulses are 2+ on the right side and 2+ on the left side.      Heart sounds: S1 normal and S2 normal. No murmur.   Pulmonary:      Effort: Pulmonary effort is normal. No tachypnea, accessory muscle usage or respiratory distress.      Breath sounds: No wheezing, rhonchi or rales.   Abdominal:      General: Bowel sounds are normal. There is no distension.      Palpations: Abdomen is soft.      Tenderness: There is no abdominal tenderness.   Musculoskeletal: Normal range of motion.   Feet:      Left foot:      Skin integrity: No ulcer, skin breakdown, erythema or warmth.      Comments: Left toe amputation . Covered .  Skin:     General: Skin is warm.      Capillary Refill: Capillary refill takes less than 2 seconds.      Findings: No rash.   Neurological:      Mental Status: He is alert and oriented to person, place, and time.   Psychiatric:         Behavior: Behavior normal.         Significant Labs: All pertinent labs within the past 24 hours have been reviewed.    Significant Imaging: I have reviewed all pertinent imaging results/findings within the past 24 hours.      Assessment/Plan:      * Gangrene of left foot  - Continue empiric IV vanc.  Pharmacy consult for dosing.  - Analgesics as needed.  - Podiatry consult.  S/P Left toe amputation     Chronic troponin elevation  - Troponin stable at baseline.  No CP or anginal equivalent.  EKG unrevealing.  - Patient with known nonobstructive CAD per [facility] in 2016.  - Continue ASA, and statin.    Diabetic ulcer of toe of left foot associated with type 2 diabetes mellitus, with necrosis of muscle  - Cont IV vanc   -Podiatrist  consulted . S/P Left toe amputation   -Wound Cx staph aureus     Stage 3 chronic kidney disease  - Creatinine stable at baseline.  Avoid nephrotoxic agents.  Follow daily labs.    PAF (paroxysmal atrial fibrillation)  - Monitor telemetry.  - Continue home Toprol XL and diltiazem.   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  - Cont levemir 20 unit qhs   - AccuChecks with moderate dose SSI.  - Diabetic diet.  - Recent HbA1c of 7.7.    Chronic combined systolic and diastolic HFrEF of 45%  - Clinically compensated   - Strict I&O's, daily weights, Na/fluid restriction.     Hypertension associated with diabetes  -BP is borderline  Low  -Hold BP medication   -D/C lasix         VTE Risk Mitigation (From admission, onward)         Ordered     IP VTE HIGH RISK PATIENT  Once      06/26/20 2323     Reason for No Pharmacological VTE Prophylaxis  Once     Question:  Reasons:  Answer:  Already adequately anticoagulated on oral Anticoagulants    06/26/20 2217     Place sequential compression device  Until discontinued      06/26/20 2219                      Anthony Hester MD  Department of Hospital Medicine   [facility]

## 2020-06-30 NOTE — ASSESSMENT & PLAN NOTE
- Hold metformin, glimepiride, and Ozempic during hospital stay.  - Cont levemir 20 unit qhs   - AccuChecks with moderate dose SSI.  - Diabetic diet.  - Recent HbA1c of 7.7.

## 2020-07-01 ENCOUNTER — ANESTHESIA EVENT (OUTPATIENT)
Dept: SURGERY | Facility: HOSPITAL | Age: 65
DRG: 256 | End: 2020-07-01
Payer: MEDICARE

## 2020-07-01 ENCOUNTER — ANESTHESIA (OUTPATIENT)
Dept: SURGERY | Facility: HOSPITAL | Age: 65
DRG: 256 | End: 2020-07-01
Payer: MEDICARE

## 2020-07-01 LAB
ANION GAP SERPL CALC-SCNC: 9 MMOL/L (ref 8–16)
BUN SERPL-MCNC: 20 MG/DL (ref 8–23)
CALCIUM SERPL-MCNC: 8.6 MG/DL (ref 8.7–10.5)
CHLORIDE SERPL-SCNC: 97 MMOL/L (ref 95–110)
CO2 SERPL-SCNC: 31 MMOL/L (ref 23–29)
CREAT SERPL-MCNC: 1.1 MG/DL (ref 0.5–1.4)
EST. GFR  (AFRICAN AMERICAN): >60 ML/MIN/1.73 M^2
EST. GFR  (NON AFRICAN AMERICAN): >60 ML/MIN/1.73 M^2
GLUCOSE SERPL-MCNC: 187 MG/DL (ref 70–110)
POCT GLUCOSE: 166 MG/DL (ref 70–110)
POCT GLUCOSE: 174 MG/DL (ref 70–110)
POCT GLUCOSE: 197 MG/DL (ref 70–110)
POCT GLUCOSE: 232 MG/DL (ref 70–110)
POCT GLUCOSE: 284 MG/DL (ref 70–110)
POTASSIUM SERPL-SCNC: 4.2 MMOL/L (ref 3.5–5.1)
SODIUM SERPL-SCNC: 137 MMOL/L (ref 136–145)
VANCOMYCIN TROUGH SERPL-MCNC: 10.2 UG/ML (ref 10–22)

## 2020-07-01 PROCEDURE — 13160 SEC CLSR SURG WND/DEHSN XTN: CPT | Mod: 58,HCNC,, | Performed by: PODIATRIST

## 2020-07-01 PROCEDURE — 94640 AIRWAY INHALATION TREATMENT: CPT | Mod: HCNC

## 2020-07-01 PROCEDURE — 25000242 PHARM REV CODE 250 ALT 637 W/ HCPCS: Mod: HCNC | Performed by: PODIATRIST

## 2020-07-01 PROCEDURE — 21400001 HC TELEMETRY ROOM: Mod: HCNC

## 2020-07-01 PROCEDURE — 25000003 PHARM REV CODE 250: Mod: HCNC | Performed by: PODIATRIST

## 2020-07-01 PROCEDURE — 37000008 HC ANESTHESIA 1ST 15 MINUTES: Mod: HCNC | Performed by: PODIATRIST

## 2020-07-01 PROCEDURE — 80048 BASIC METABOLIC PNL TOTAL CA: CPT | Mod: HCNC

## 2020-07-01 PROCEDURE — 25000003 PHARM REV CODE 250: Mod: HCNC | Performed by: INTERNAL MEDICINE

## 2020-07-01 PROCEDURE — 13160 PR SECD CLOS SURG WND EXTEN/COMPLIC: ICD-10-PCS | Mod: 58,HCNC,, | Performed by: PODIATRIST

## 2020-07-01 PROCEDURE — S0020 INJECTION, BUPIVICAINE HYDRO: HCPCS | Mod: HCNC | Performed by: PODIATRIST

## 2020-07-01 PROCEDURE — 99900037 HC PT THERAPY SCREENING (STAT): Mod: HCNC

## 2020-07-01 PROCEDURE — C9399 UNCLASSIFIED DRUGS OR BIOLOG: HCPCS | Mod: HCNC | Performed by: PODIATRIST

## 2020-07-01 PROCEDURE — 63600175 PHARM REV CODE 636 W HCPCS: Mod: HCNC | Performed by: NURSE ANESTHETIST, CERTIFIED REGISTERED

## 2020-07-01 PROCEDURE — 36000707: Mod: HCNC | Performed by: PODIATRIST

## 2020-07-01 PROCEDURE — 80202 ASSAY OF VANCOMYCIN: CPT | Mod: HCNC

## 2020-07-01 PROCEDURE — 36000706: Mod: HCNC | Performed by: PODIATRIST

## 2020-07-01 PROCEDURE — 36415 COLL VENOUS BLD VENIPUNCTURE: CPT | Mod: HCNC

## 2020-07-01 PROCEDURE — 99900038 HC OT GENERIC THERAPY SCREENING (STAT): Mod: HCNC

## 2020-07-01 PROCEDURE — 25000003 PHARM REV CODE 250: Mod: HCNC | Performed by: NURSE ANESTHETIST, CERTIFIED REGISTERED

## 2020-07-01 PROCEDURE — 71000033 HC RECOVERY, INTIAL HOUR: Mod: HCNC | Performed by: PODIATRIST

## 2020-07-01 PROCEDURE — 63600175 PHARM REV CODE 636 W HCPCS: Mod: HCNC | Performed by: PODIATRIST

## 2020-07-01 PROCEDURE — 37000009 HC ANESTHESIA EA ADD 15 MINS: Mod: HCNC | Performed by: PODIATRIST

## 2020-07-01 RX ORDER — HYDROMORPHONE HYDROCHLORIDE 2 MG/ML
0.2 INJECTION, SOLUTION INTRAMUSCULAR; INTRAVENOUS; SUBCUTANEOUS EVERY 5 MIN PRN
Status: DISCONTINUED | OUTPATIENT
Start: 2020-07-01 | End: 2020-07-01

## 2020-07-01 RX ORDER — DIPHENHYDRAMINE HYDROCHLORIDE 50 MG/ML
25 INJECTION INTRAMUSCULAR; INTRAVENOUS EVERY 6 HOURS PRN
Status: DISCONTINUED | OUTPATIENT
Start: 2020-07-01 | End: 2020-07-01

## 2020-07-01 RX ORDER — LIDOCAINE HYDROCHLORIDE 10 MG/ML
INJECTION, SOLUTION EPIDURAL; INFILTRATION; INTRACAUDAL; PERINEURAL
Status: DISCONTINUED | OUTPATIENT
Start: 2020-07-01 | End: 2020-07-01 | Stop reason: HOSPADM

## 2020-07-01 RX ORDER — FENTANYL CITRATE 50 UG/ML
INJECTION, SOLUTION INTRAMUSCULAR; INTRAVENOUS
Status: DISCONTINUED | OUTPATIENT
Start: 2020-07-01 | End: 2020-07-01

## 2020-07-01 RX ORDER — SODIUM CHLORIDE, SODIUM LACTATE, POTASSIUM CHLORIDE, CALCIUM CHLORIDE 600; 310; 30; 20 MG/100ML; MG/100ML; MG/100ML; MG/100ML
INJECTION, SOLUTION INTRAVENOUS CONTINUOUS PRN
Status: DISCONTINUED | OUTPATIENT
Start: 2020-07-01 | End: 2020-07-01

## 2020-07-01 RX ORDER — MIDAZOLAM HYDROCHLORIDE 1 MG/ML
INJECTION, SOLUTION INTRAMUSCULAR; INTRAVENOUS
Status: DISCONTINUED | OUTPATIENT
Start: 2020-07-01 | End: 2020-07-01

## 2020-07-01 RX ORDER — PROPOFOL 10 MG/ML
VIAL (ML) INTRAVENOUS
Status: DISCONTINUED | OUTPATIENT
Start: 2020-07-01 | End: 2020-07-01

## 2020-07-01 RX ORDER — AMOXICILLIN AND CLAVULANATE POTASSIUM 875; 125 MG/1; MG/1
1 TABLET, FILM COATED ORAL EVERY 12 HOURS
Status: DISCONTINUED | OUTPATIENT
Start: 2020-07-01 | End: 2020-07-03

## 2020-07-01 RX ORDER — METOCLOPRAMIDE HYDROCHLORIDE 5 MG/ML
10 INJECTION INTRAMUSCULAR; INTRAVENOUS EVERY 10 MIN PRN
Status: DISCONTINUED | OUTPATIENT
Start: 2020-07-01 | End: 2020-07-01

## 2020-07-01 RX ORDER — MEPERIDINE HYDROCHLORIDE 25 MG/ML
12.5 INJECTION INTRAMUSCULAR; INTRAVENOUS; SUBCUTANEOUS ONCE AS NEEDED
Status: DISCONTINUED | OUTPATIENT
Start: 2020-07-01 | End: 2020-07-01

## 2020-07-01 RX ORDER — BUPIVACAINE HYDROCHLORIDE 5 MG/ML
INJECTION, SOLUTION EPIDURAL; INTRACAUDAL
Status: DISCONTINUED | OUTPATIENT
Start: 2020-07-01 | End: 2020-07-01 | Stop reason: HOSPADM

## 2020-07-01 RX ORDER — SODIUM CHLORIDE 0.9 % (FLUSH) 0.9 %
3 SYRINGE (ML) INJECTION EVERY 8 HOURS
Status: DISCONTINUED | OUTPATIENT
Start: 2020-07-01 | End: 2020-07-01

## 2020-07-01 RX ORDER — LIDOCAINE HYDROCHLORIDE 10 MG/ML
INJECTION, SOLUTION EPIDURAL; INFILTRATION; INTRACAUDAL; PERINEURAL
Status: DISCONTINUED | OUTPATIENT
Start: 2020-07-01 | End: 2020-07-01

## 2020-07-01 RX ORDER — DOXYCYCLINE HYCLATE 100 MG
100 TABLET ORAL EVERY 12 HOURS
Status: DISCONTINUED | OUTPATIENT
Start: 2020-07-01 | End: 2020-07-03 | Stop reason: HOSPADM

## 2020-07-01 RX ADMIN — HYDROCODONE BITARTRATE AND ACETAMINOPHEN 1 TABLET: 10; 325 TABLET ORAL at 04:07

## 2020-07-01 RX ADMIN — HYDROCODONE BITARTRATE AND ACETAMINOPHEN 1 TABLET: 10; 325 TABLET ORAL at 10:07

## 2020-07-01 RX ADMIN — LIDOCAINE HYDROCHLORIDE 50 MG: 10 INJECTION, SOLUTION EPIDURAL; INFILTRATION; INTRACAUDAL; PERINEURAL at 09:07

## 2020-07-01 RX ADMIN — LATANOPROST 1 DROP: 50 SOLUTION OPHTHALMIC at 09:07

## 2020-07-01 RX ADMIN — MIDAZOLAM 2 MG: 1 INJECTION INTRAMUSCULAR; INTRAVENOUS at 09:07

## 2020-07-01 RX ADMIN — FENTANYL CITRATE 100 MCG: 50 INJECTION, SOLUTION INTRAMUSCULAR; INTRAVENOUS at 09:07

## 2020-07-01 RX ADMIN — IPRATROPIUM BROMIDE 0.5 MG: 0.5 SOLUTION RESPIRATORY (INHALATION) at 12:07

## 2020-07-01 RX ADMIN — METOPROLOL SUCCINATE 50 MG: 50 TABLET, EXTENDED RELEASE ORAL at 11:07

## 2020-07-01 RX ADMIN — GABAPENTIN 800 MG: 400 CAPSULE ORAL at 11:07

## 2020-07-01 RX ADMIN — PROPOFOL 20 MG: 10 INJECTION, EMULSION INTRAVENOUS at 09:07

## 2020-07-01 RX ADMIN — DILTIAZEM HYDROCHLORIDE 60 MG: 60 TABLET, FILM COATED ORAL at 09:07

## 2020-07-01 RX ADMIN — PROPOFOL 20 MG: 10 INJECTION, EMULSION INTRAVENOUS at 10:07

## 2020-07-01 RX ADMIN — LISINOPRIL 10 MG: 10 TABLET ORAL at 11:07

## 2020-07-01 RX ADMIN — MORPHINE SULFATE 2 MG: 2 INJECTION, SOLUTION INTRAMUSCULAR; INTRAVENOUS at 04:07

## 2020-07-01 RX ADMIN — APIXABAN 5 MG: 2.5 TABLET, FILM COATED ORAL at 09:07

## 2020-07-01 RX ADMIN — ATORVASTATIN CALCIUM 40 MG: 40 TABLET, FILM COATED ORAL at 09:07

## 2020-07-01 RX ADMIN — INSULIN DETEMIR 20 UNITS: 100 INJECTION, SOLUTION SUBCUTANEOUS at 09:07

## 2020-07-01 RX ADMIN — BUDESONIDE 0.5 MG: 0.5 SUSPENSION RESPIRATORY (INHALATION) at 07:07

## 2020-07-01 RX ADMIN — INSULIN ASPART 4 UNITS: 100 INJECTION, SOLUTION INTRAVENOUS; SUBCUTANEOUS at 04:07

## 2020-07-01 RX ADMIN — ARFORMOTEROL TARTRATE 15 MCG: 15 SOLUTION RESPIRATORY (INHALATION) at 07:07

## 2020-07-01 RX ADMIN — AMOXICILLIN AND CLAVULANATE POTASSIUM 1 TABLET: 875; 125 TABLET, FILM COATED ORAL at 09:07

## 2020-07-01 RX ADMIN — IPRATROPIUM BROMIDE 0.5 MG: 0.5 SOLUTION RESPIRATORY (INHALATION) at 07:07

## 2020-07-01 RX ADMIN — ISOSORBIDE MONONITRATE 60 MG: 60 TABLET, EXTENDED RELEASE ORAL at 11:07

## 2020-07-01 RX ADMIN — DOXYCYCLINE HYCLATE 100 MG: 100 TABLET, COATED ORAL at 09:07

## 2020-07-01 RX ADMIN — ASPIRIN 81 MG: 81 TABLET, COATED ORAL at 11:07

## 2020-07-01 RX ADMIN — INSULIN ASPART 3 UNITS: 100 INJECTION, SOLUTION INTRAVENOUS; SUBCUTANEOUS at 09:07

## 2020-07-01 RX ADMIN — DILTIAZEM HYDROCHLORIDE 60 MG: 60 TABLET, FILM COATED ORAL at 11:07

## 2020-07-01 RX ADMIN — PANTOPRAZOLE SODIUM 40 MG: 40 TABLET, DELAYED RELEASE ORAL at 11:07

## 2020-07-01 RX ADMIN — MORPHINE SULFATE 2 MG: 2 INJECTION, SOLUTION INTRAMUSCULAR; INTRAVENOUS at 06:07

## 2020-07-01 RX ADMIN — GABAPENTIN 800 MG: 400 CAPSULE ORAL at 09:07

## 2020-07-01 RX ADMIN — TAMSULOSIN HYDROCHLORIDE 0.4 MG: 0.4 CAPSULE ORAL at 11:07

## 2020-07-01 RX ADMIN — SODIUM CHLORIDE, SODIUM LACTATE, POTASSIUM CHLORIDE, AND CALCIUM CHLORIDE: 600; 310; 30; 20 INJECTION, SOLUTION INTRAVENOUS at 09:07

## 2020-07-01 RX ADMIN — SERTRALINE HYDROCHLORIDE 100 MG: 50 TABLET ORAL at 09:07

## 2020-07-01 NOTE — PROGRESS NOTES
Pharmacy Vancomycin Consult Note    Therapy with Vancomycin complete and/or consult discontinued by provider.  Pharmacy will sign off, please re-consult as needed.    Thank you for allowing us to participate in this patient's care.  No Perla, Pharm D 7/1/2020 2:52 PM

## 2020-07-01 NOTE — TRANSFER OF CARE
"Anesthesia Transfer of Care Note    Patient: Crow Green    Procedure(s) Performed: Procedure(s) (LRB):  CLOSURE, WOUND (Left)    Patient location: PACU    Anesthesia Type: MAC    Transport from OR: Transported from OR on room air with adequate spontaneous ventilation    Post pain: adequate analgesia    Post assessment: no apparent anesthetic complications and tolerated procedure well    Post vital signs: stable    Level of consciousness: responds to stimulation    Nausea/Vomiting: no nausea/vomiting    Complications: none    Transfer of care protocol was followed      Last vitals:   Visit Vitals  BP (!) 144/80   Pulse 84   Temp 37 °C (98.6 °F)   Resp 18   Ht 5' 5" (1.651 m)   Wt 89 kg (196 lb 3.4 oz)   SpO2 96%   BMI 32.65 kg/m²     "

## 2020-07-01 NOTE — OP NOTE
Patient: Crow Green  : 1955  MR#: [id number]  DOS: 2020  Surgeon: Dr. Barb Veras D.P.M.  Assistant: None  Pre-Op Dx: Open Amputation Wound of Left Hallux  Post Op Dx: Open Amputation Wound of Left Hallux  Procedure: Secondary Closure of Open Amputation Wound of Left Hallux  Anesthesia: IV sedation with local anesthesia  Hemostasis: Pneumatic Ankle Tourniquet @250mmHg  EBL: 15cc  Materials: 3-0 prolene, 1/2' packing  Injectables: Pre-op: 20cc of 1:1 mixture of 1% Lidocaine plain and 0.5% Marcaine plain  Post-op: 10cc of 0.5% Marcaine plain   Specimen: Necrotic soft tissue and Bone     Procedure in detail:  The patient was brought into the operating room and placed on operating table in the supine position. Following IV sedation, local anesthesia was obtained about the patients Left 1st ray utilizing 20cc of 1:1 mixture of 1% Lidocaine plain and 0.5% Marcaine plain. The Left foot was then scrubbed, prepped, and draped in the usual aseptic manner. The left lower extremity was then raised to exsanguinate the Left foot and the pneumatic ankle tourniquet was then inflated.     Attention was then directed to the left hallux open amputation wound. The flexor and extensor tendons were both identified and pulled distally and cut as for proximally as allowed and passed from the operative field. The wound was then irrigated with sterile saline solution. Wound was then reinspected and found to be free of all necrotic soft tissue and bone. No expression of purulent drainage was noted.   Next the skin was reapproximated utilizing 3-0 prolene. Upon completion of the procedure a postoperative block consisting of 10cc of 0.5% Marcaine plain was injected about the incison site.   Between the sutures of the most lateral portion on the incsion was then packed with 1/2' packing and dressed with compressive dressing consisting of  betadine soaked adaptic, gauze, jewel, and ACE.     At this time, a pneumatic  ankle tourniquet was then deflated and a prompt hyperemic response was noted to all toes of the Left foot. The patient tolerated anesthesia and procedure well. The patient was transported to PACU with vital signs stable and neurovascular status intact to the Left foot.

## 2020-07-01 NOTE — ANESTHESIA POSTPROCEDURE EVALUATION
Anesthesia Post Evaluation    Patient: Crow Green    Procedure(s) Performed: Procedure(s) (LRB):  CLOSURE, WOUND (Left)    Final Anesthesia Type: MAC    Patient location during evaluation: PACU  Patient participation: Yes- Able to Participate  Level of consciousness: awake and alert, oriented and awake  Post-procedure vital signs: reviewed and stable  Pain management: adequate  Airway patency: patent    PONV status at discharge: No PONV  Anesthetic complications: no      Cardiovascular status: blood pressure returned to baseline  Respiratory status: unassisted and spontaneous ventilation  Hydration status: euvolemic  Follow-up not needed.          Vitals Value Taken Time   /72 07/01/20 1105   Temp 36.9 °C (98.4 °F) 07/01/20 1105   Pulse 80 07/01/20 1105   Resp 20 07/01/20 1105   SpO2 96 % 07/01/20 1105         Event Time   Out of Recovery 07/01/2020 10:52:25         Pain/Raymond Score: Pain Rating Prior to Med Admin: 10 (7/1/2020  4:41 AM)  Pain Rating Post Med Admin: 4 (6/30/2020 10:00 PM)  Raymond Score: 10 (7/1/2020 10:30 AM)

## 2020-07-01 NOTE — PLAN OF CARE
Pt AAO x4.  VSS.  Pt remained afebrile throughout this shift.   Pt remained free of falls this shift.   Pt c/o of L foot pain this shift.  Blood sugar monitoring this shift  Plan of care reviewed. Patient verbalizes understanding.   Pt moving/turing independently. Frequent weight shifting encouraged.  Patient SR on monitor.   Bed low, side rails up x 2, wheels locked, call light in reach.   Bed alarm maintained for safety.   Patient instructed to call for assistance.   Hourly rounding completed.   24 hour chart check completed.  Will continue to monitor.

## 2020-07-01 NOTE — PLAN OF CARE
Patient to the OR today for secondary closure of left hallux open amputation wound. Patient tolerated procedure well.   Dressing to remain dry, clean, and intact until his office appointment.   Patient can heel weightbear on left foot with surgical shoe.   As patient's WBC continue to trend down patient can be discharged home on oral antibiotics as per wound cultures.   Patient was previously scheduled by my office with Dr. Lemus (vascular) for July 7,2020 before hospital admittance, I would like patient to try and keep this appointment. I will contact social work to make sure patient transportation can be arranged for apportionment.   Patient scheduled for my office at [facility] for 07/06/2020 @ 10:00am.

## 2020-07-01 NOTE — PT/OT/SLP PROGRESS
Physical Therapy      Patient Name:  Crow Green   MRN:  [id number]    ANDRES GARAY INITIATED THIS AM VIA CHART REVIEW, DR. MATT REPORTS PT GOING BACK TO [facility]. SO TO HOLD UNTIL TOMORROW, WILL ASSESS PT NEXT VISIT    Linda Pickett, PT   7/1/2020  0989

## 2020-07-01 NOTE — ANESTHESIA PREPROCEDURE EVALUATION
07/01/2020  Crow Green is a 65 y.o., male.    Anesthesia Evaluation    I have reviewed the Patient Summary Reports.    I have reviewed the Nursing Notes.    I have reviewed the Medications.     Review of Systems  Anesthesia Hx:  No problems with previous Anesthesia  History of prior surgery of interest to airway management or planning: Previous anesthesia: General  Denies Personal Hx of Anesthesia complications.   Social:  Non-Smoker    Hematology/Oncology:  Hematology Normal   Oncology Normal     EENT/Dental:EENT/Dental Normal   Cardiovascular:   Hypertension Angina CHF ECG has been reviewed. Echo 2/2020  · Normal appearing Left atrial appendage. Velocity 0.7m/s. No thrombus visualized in the BARBARA or left atrium.  · Mildly decreased left ventricular systolic function. The estimated ejection fraction is 45%.  · Low normal right ventricular systolic function.  · Grade 2 plaque present in the ascending aorta and transverse aorta.   Pulmonary:   Asthma Sleep Apnea, CPAP    Renal/:   Chronic Renal Disease, CRI    Hepatic/GI:   GERD    Musculoskeletal:  Musculoskeletal Normal    Neurological:   Peripheral Neuropathy    Endocrine:   Diabetes, type 2    Dermatological:  Skin Normal    Psych:   Psychiatric History depression          Physical Exam  General:  Well nourished, Obesity    Airway/Jaw/Neck:  Airway Findings: Tongue: Normal Mallampati: III      Dental:  Dental Findings: In tact   Chest/Lungs:  Chest/Lungs Clear    Heart/Vascular:  Heart Findings: Normal Heart murmur: negative       Mental Status:  Mental Status Findings:  Cooperative, Alert and Oriented         Anesthesia Plan  Type of Anesthesia, risks & benefits discussed:  Anesthesia Type:  MAC  Patient's Preference:   Intra-op Monitoring Plan: standard ASA monitors  Intra-op Monitoring Plan Comments:   Post Op Pain Control Plan: multimodal  analgesia  Post Op Pain Control Plan Comments:   Induction:   IV  Beta Blocker:  Patient is on a Beta-Blocker and has received one dose within the past 24 hours (No further documentation required).       Informed Consent: Patient understands risks and agrees with Anesthesia plan.  Questions answered. Anesthesia consent signed with patient.  ASA Score: 3     Day of Surgery Review of History & Physical: I have interviewed and examined the patient. I have reviewed the patient's H&P dated:    H&P update referred to the surgeon.         Ready For Surgery From Anesthesia Perspective.

## 2020-07-01 NOTE — CONSULTS
[facility] -   General Surgery  Consult Note    Inpatient consult to Vascular Surgery  Consult performed by: Elías Lemus MD  Consult ordered by: Barb Veras DPM        Subjective:     Chief Complaint/Reason for Admission: left toe infection    History of Present Illness: 66 y/o male diabetic, admitted with toe infection.  Now s/p amputation.  Today for planned closure of wound.  Vascular studies performed previously this admission do not suggest any significant vascular lesions    No current facility-administered medications on file prior to encounter.      Current Outpatient Medications on File Prior to Encounter   Medication Sig    metoprolol succinate (TOPROL-XL) 50 MG 24 hr tablet Take 1 tablet (50 mg total) by mouth once daily.    ALCOHOL ANTISEPTIC PADS (ALCOHOL PREP PADS TOP)     allopurinoL (ZYLOPRIM) 100 MG tablet Take 1 tablet (100 mg total) by mouth once daily.    aspirin (ECOTRIN) 81 MG EC tablet Take 1 tablet (81 mg total) by mouth once daily.    atorvastatin (LIPITOR) 40 MG tablet TAKE 1 TABLET EVERY EVENING    azaTHIOprine (IMURAN) 50 mg Tab Take 1 tablet (50 mg total) by mouth once daily.    baclofen (LIORESAL) 10 MG tablet Take 1 tablet (10 mg total) by mouth once daily.    blood sugar diagnostic (TRUE METRIX GLUCOSE TEST STRIP) Strp Use with blood glucose meter kit to test blood sugar four times  daily    blood-glucose meter (TRUE METRIX AIR GLUCOSE METER) kit TEST FOUR TIMES DAILY BEFORE MEALS  AND EVERY NIGHT    colchicine (COLCRYS) 0.6 mg tablet Take 1 tablet (0.6 mg total) by mouth once daily.    diclofenac sodium (VOLTAREN) 1 % Gel Apply 2 g topically 4 (four) times daily.    diltiaZEM (CARDIZEM SR) 60 MG Cp12 Take 1 capsule (60 mg total) by mouth 2 (two) times daily.    ELIQUIS 5 mg Tab Take 1 tablet (5 mg total) by mouth 2 (two) times daily.    fluticasone-salmeterol diskus inhaler 250-50 mcg Inhale 1 puff into the lungs 2 (two) times daily. Controller     food supplemt, lactose-reduced (ENSURE) Liqd Take 118 mLs by mouth 3 (three) times daily with meals.    furosemide (LASIX) 40 MG tablet TAKE 2 TABLETS EVERY MORNING  AND TAKE 1 TABLET EVERY EVENING    gabapentin (NEURONTIN) 800 MG tablet Take 1 tablet (800 mg total) by mouth 3 (three) times daily.    glimepiride (AMARYL) 2 MG tablet TAKE 1 TABLET (2 MG TOTAL) BY MOUTH BEFORE BREAKFAST.    HYDROcodone-acetaminophen (NORCO) 5-325 mg per tablet Take 1 tablet by mouth every 6 (six) hours as needed for Pain.    inhalation spacing device Use as directed for inhalation.    insulin (LANTUS SOLOSTAR U-100 INSULIN) glargine 100 units/mL (3mL) SubQ pen INJECT 80 UNITS SUBCUTANEOUSLY EVERY EVENING    insulin aspart U-100 (NOVOLOG FLEXPEN U-100 INSULIN) 100 unit/mL (3 mL) InPn pen INJECT 20 UNITS SUBCUTANEOUSLY THREE TIMES DAILY WITH MEALS    insulin syringe-needle U-100 1 mL 31 gauge x 5/16 Syrg     ipratropium (ATROVENT) 0.02 % nebulizer solution USE 1 VIAL VIA NEBULIZER FOUR TIMES DAILY    isosorbide mononitrate (IMDUR) 60 MG 24 hr tablet Take 1 tablet (60 mg total) by mouth once daily.    ketorolac (TORADOL) 10 mg tablet Take 1 tablet (10 mg total) by mouth every 8 (eight) hours as needed for Pain.    lancets 32 gauge Misc 1 lancet by Misc.(Non-Drug; Combo Route) route 2 (two) times daily.    latanoprost 0.005 % ophthalmic solution INSTILL 1 DROP INTO BOTH EYES EVERY EVENING    lisinopril 10 MG tablet Take 1 tablet (10 mg total) by mouth once daily.    metFORMIN (GLUCOPHAGE-XR) 500 MG XR 24hr tablet Take 2 tablets (1,000 mg total) by mouth 2 (two) times daily with meals.    methylPREDNISolone (MEDROL DOSEPACK) 4 mg tablet Take as directed on the package.    nitroGLYCERIN (NITROSTAT) 0.3 MG SL tablet PLACE 1 TABLET UNDER THE TONGUE EVERY 5 MINUTES AS NEEDED FOR CHEST PAIN, NO MORE THAN 3 TABLETS IN ONE DAY    OZEMPIC 1 mg/dose (2 mg/1.5 mL) PnIj     pantoprazole (PROTONIX) 40 MG tablet Take 1 tablet (40 mg  "total) by mouth once daily.    pen needle, diabetic (BD ULTRA-FINE SHORT PEN NEEDLE) 31 gauge x 5/16" Ndle Inject 1 each into the skin 3 (three) times daily.    predniSONE (DELTASONE) 5 MG tablet Take 1 tablet (5 mg total) by mouth 2 (two) times daily.    sertraline (ZOLOFT) 100 MG tablet Take 1 tablet (100 mg total) by mouth every evening.    sertraline (ZOLOFT) 50 MG tablet TAKE 1 TABLET ONE TIME DAILY    silver sulfADIAZINE 1% (SILVADENE) 1 % cream Apply topically 2 (two) times daily.    tamsulosin (FLOMAX) 0.4 mg Cap Take 1 capsule (0.4 mg total) by mouth once daily.    tiotropium (SPIRIVA WITH HANDIHALER) 18 mcg inhalation capsule Inhale 1 capsule (18 mcg total) into the lungs once daily. Controller    traZODone (DESYREL) 100 MG tablet Take 2 tablets (200 mg total) by mouth every evening.    [employer] AEROSOL DELIVERY SYSTEM Shefali USE AS DIRESTED       Review of patient's allergies indicates:   Allergen Reactions    Protamine Hives     Urticaria, possible upper airway swelling       Past Medical History:   Diagnosis Date    Acute kidney injury     Arthritis     Asthma     Atrial fibrillation     Atrial fibrillation with RVR 8/7/2019    CHF (congestive heart failure)     Depression     Diabetes mellitus, type 2 Diagnosed in 2000    Elevated PSA     Hypertension     Sleep apnea      Past Surgical History:   Procedure Laterality Date    ABLATION OF ARRHYTHMOGENIC FOCUS FOR ATRIAL FIBRILLATION N/A 2/27/2020    Procedure: Ablation atrial fibrillation;  Surgeon: Gio Brown MD;  Location: [facility] EP LAB;  Service: Cardiology;  Laterality: N/A;  afib, DAMIEN (cx if SR), PVI, PITO, anes, MB, 3 Prep    CHOLECYSTECTOMY      SELECTIVE INJECTION OF ANESTHETIC AGENT AROUND LUMBAR SPINAL NERVE ROOT BY TRANSFORAMINAL APPROACH Left 6/11/2020    Procedure: BLOCK, SPINAL NERVE ROOT, LUMBAR, SELECTIVE, TRANSFORAMINAL APPROACH Left L4-5, L5-S1 TFESI with RN IV sedation;  Surgeon: Dillan Tsai MD;  Location: [facility]" PAIN MGT;  Service: Pain Management;  Laterality: Left;    TOE AMPUTATION Left 6/29/2020    Procedure: AMPUTATION, TOE;  Surgeon: Barb Veras DPM;  Location[facility];  Service: Podiatry;  Laterality: Left;  great toe     Family History     Problem Relation (Age of Onset)    Cataracts Mother, Father, Sister    Glaucoma Mother, Sister, Maternal Aunt    No Known Problems Brother, Daughter, Son        Tobacco Use    Smoking status: Never Smoker    Smokeless tobacco: Never Used   Substance and Sexual Activity    Alcohol use: No     Comment: occ    Drug use: No    Sexual activity: Yes     Partners: Female     Review of Systems  Objective:     Vital Signs (Most Recent):  Temp: 98.6 °F (37 °C) (07/01/20 0750)  Pulse: 87 (07/01/20 0750)  Resp: 18 (07/01/20 0750)  BP: (!) 144/80 (07/01/20 0750)  SpO2: 96 % (07/01/20 0750) Vital Signs (24h Range):  Temp:  [97.3 °F (36.3 °C)-99.7 °F (37.6 °C)] 98.6 °F (37 °C)  Pulse:  [70-98] 87  Resp:  [16-20] 18  SpO2:  [88 %-97 %] 96 %  BP: (121-144)/(65-80) 144/80     Weight: 89 kg (196 lb 3.4 oz)  Body mass index is 32.65 kg/m².      Intake/Output Summary (Last 24 hours) at 7/1/2020 0901  Last data filed at 6/30/2020 1830  Gross per 24 hour   Intake 90320 ml   Output 900 ml   Net 27843 ml       Physical Exam    Significant Labs:  BMP:   Recent Labs   Lab 07/01/20  0424   *      K 4.2   CL 97   CO2 31*   BUN 20   CREATININE 1.1   CALCIUM 8.6*     CBC:   Recent Labs   Lab 06/30/20  0413   WBC 15.72*   RBC 3.41*   HGB 9.1*   HCT 30.3*      MCV 89   MCH 26.7*   MCHC 30.0*       Significant Diagnostics:  I have reviewed all pertinent imaging results/findings within the past 24 hours.    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  He does have some risk factors for PAD including DM but his vascular studies are normal.  Do not anticipate any need for vascular intervention at this time.  He may f/u with vascular srsally horace in the office for long term vascular care    Thank you for your consult. I will follow-up with patient. Please contact us if you have any additional questions.    Elías Lemus MD  General Surgery  [facility] [place]

## 2020-07-01 NOTE — BRIEF OP NOTE
[facility] -   Brief Operative Note    SUMMARY     Surgery Date: 7/1/2020     Surgeon(s) and Role:     * Barb Veras DPM - Primary    Assisting Surgeon: None    Pre-op Diagnosis:  Gangrene of left foot [I96]    Post-op Diagnosis:  Post-Op Diagnosis Codes:     * Gangrene of left foot [I96]    Procedure(s) (LRB):  CLOSURE, WOUND (Left)    Anesthesia: Local MAC    Description of Procedure: Secondary Closure of Left Hallux Open Amputation    Description of the findings of the procedure: No necrotic soft tissue or purulent drainage noted.     Estimated Blood Loss: 15cc recorded between 7/1/2020  9:52 AM and 7/1/2020 10:26 AM *         Specimens:   Specimen (12h ago, onward)    None

## 2020-07-01 NOTE — PROGRESS NOTES
Subjective:     Patient ID: Crow Green is a 65 y.o. male.    Chief Complaint: Toe Pain (left great toe pain, on atb)    Crow is a 65 y.o. male seen at bedside 2 days status post left hallux amputation procedure. Patient has no complaints of fever chills or sweats. Positive decreased pain. Patient states dressing was kept dry, clean, and intact. Patient does still complain of pain in the left lower leg when he hangs the leg down on the side of the bed. Patient agrees to proceed with secondary closure of open amputation site.       Patient Active Problem List   Diagnosis    ED (erectile dysfunction)    Non-proliferative diabetic retinopathy, mild, both eyes    Hypertension associated with diabetes    Diabetic polyneuropathy    Nonobstructive coronary artery disease of native artery of native heart    Chronic combined systolic and diastolic HFrEF of 45%    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, with long-term current use of insulin    SANDRITA on CPAP    Moderate asthma    Psychophysiological insomnia    Nightmares    Sleep related gastroesophageal reflux disease    Inadequate sleep hygiene    PAF (paroxysmal atrial fibrillation)    At risk for medication noncompliance    Obesity (BMI 30.0-34.9)    Indeterminate stage chronic open angle glaucoma    Right hip pain    Gait instability    Chronic right shoulder pain    Arthritis    Left sided sciatica    Dyspnea on exertion    Osteoarthritis of spine with radiculopathy, lumbar region    HNP (herniated nucleus pulposus), lumbar    Gastroesophageal reflux disease without esophagitis    Chronic diastolic heart failure    Hypogonadism in male    Disorder of parathyroid gland    Hyperlipidemia associated with type 2 diabetes mellitus    LRTI (lower respiratory tract infection)    Physical deconditioning    Acute pain of left shoulder    Traumatic tear of left rotator cuff     Atrial fibrillation with RVR    Other chronic pain    Left shoulder pain    Complete tear of left rotator cuff    Stage 3 chronic kidney disease    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 associated with type 2 diabetes mellitus    Lumbar radiculopathy    Diabetic ulcer of toe of left foot associated with type 2 diabetes mellitus, with necrosis of muscle    Gangrene of left foot    Ulcer of left foot    Chronic troponin elevation       Current Discharge Medication List      CONTINUE these medications which have NOT CHANGED    Details   metoprolol succinate (TOPROL-XL) 50 MG 24 hr tablet Take 1 tablet (50 mg total) by mouth once daily.  Qty: 90 tablet, Refills: 3      ALCOHOL ANTISEPTIC PADS (ALCOHOL PREP PADS TOP)       allopurinoL (ZYLOPRIM) 100 MG tablet Take 1 tablet (100 mg total) by mouth once daily.  Qty: 30 tablet, Refills: 0      aspirin (ECOTRIN) 81 MG EC tablet Take 1 tablet (81 mg total) by mouth once daily.  Refills: 0      atorvastatin (LIPITOR) 40 MG tablet TAKE 1 TABLET EVERY EVENING  Qty: 90 tablet, Refills: 4    Associated Diagnoses: Hyperlipidemia associated with type 2 diabetes mellitus      azaTHIOprine (IMURAN) 50 mg Tab Take 1 tablet (50 mg total) by mouth once daily.  Qty: 90 tablet, Refills: 0    Associated Diagnoses: Dermatomyositis      baclofen (LIORESAL) 10 MG tablet Take 1 tablet (10 mg total) by mouth once daily.  Qty: 90 tablet, Refills: 3      blood sugar diagnostic (TRUE METRIX GLUCOSE TEST STRIP) Strp Use with blood glucose meter kit to test blood sugar four times  daily  Qty: 200 strip, Refills: 99      blood-glucose meter (TRUE METRIX AIR GLUCOSE METER) kit TEST FOUR TIMES DAILY BEFORE MEALS  AND EVERY NIGHT  Qty: 1 each, Refills: 0    Associated Diagnoses: Type 2 diabetes mellitus with mild nonproliferative retinopathy, with long-term current use of insulin, macular edema presence unspecified, unspecified laterality      colchicine  (COLCRYS) 0.6 mg tablet Take 1 tablet (0.6 mg total) by mouth once daily.  Qty: 30 tablet, Refills: 0      diclofenac sodium (VOLTAREN) 1 % Gel Apply 2 g topically 4 (four) times daily.  Qty: 1 Tube, Refills: 5    Associated Diagnoses: Left shoulder pain, unspecified chronicity      diltiaZEM (CARDIZEM SR) 60 MG Cp12 Take 1 capsule (60 mg total) by mouth 2 (two) times daily.  Qty: 60 capsule, Refills: 11      ELIQUIS 5 mg Tab Take 1 tablet (5 mg total) by mouth 2 (two) times daily.  Qty: 60 tablet, Refills: 0      fluticasone-salmeterol diskus inhaler 250-50 mcg Inhale 1 puff into the lungs 2 (two) times daily. Controller  Qty: 60 each, Refills: 11    Associated Diagnoses: Moderate persistent asthma without complication      food supplemt, lactose-reduced (ENSURE) Liqd Take 118 mLs by mouth 3 (three) times daily with meals.  Qty: 1 Bottle, Refills: 4    Comments: Case of ensure  Associated Diagnoses: Physical deconditioning      furosemide (LASIX) 40 MG tablet TAKE 2 TABLETS EVERY MORNING  AND TAKE 1 TABLET EVERY EVENING  Qty: 270 tablet, Refills: 0      gabapentin (NEURONTIN) 800 MG tablet Take 1 tablet (800 mg total) by mouth 3 (three) times daily.  Qty: 270 tablet, Refills: 4    Associated Diagnoses: Osteoarthritis of spine with radiculopathy, lumbar region; HNP (herniated nucleus pulposus), lumbar      glimepiride (AMARYL) 2 MG tablet TAKE 1 TABLET (2 MG TOTAL) BY MOUTH BEFORE BREAKFAST.  Qty: 90 tablet, Refills: 3      HYDROcodone-acetaminophen (NORCO) 5-325 mg per tablet Take 1 tablet by mouth every 6 (six) hours as needed for Pain.  Qty: 9 tablet, Refills: 0    Comments: Quantity prescribed more than 7 day supply? No      inhalation spacing device Use as directed for inhalation.  Qty: 1 Device, Refills: 0      insulin (LANTUS SOLOSTAR U-100 INSULIN) glargine 100 units/mL (3mL) SubQ pen INJECT 80 UNITS SUBCUTANEOUSLY EVERY EVENING  Qty: 75 mL, Refills: 4    Associated Diagnoses: Type 2 diabetes mellitus with  mild nonproliferative retinopathy, with long-term current use of insulin, macular edema presence unspecified, unspecified laterality      insulin aspart U-100 (NOVOLOG FLEXPEN U-100 INSULIN) 100 unit/mL (3 mL) InPn pen INJECT 20 UNITS SUBCUTANEOUSLY THREE TIMES DAILY WITH MEALS  Qty: 15 mL, Refills: 0    Associated Diagnoses: Type 2 diabetes mellitus with mild nonproliferative retinopathy, with long-term current use of insulin, macular edema presence unspecified, unspecified laterality      insulin syringe-needle U-100 1 mL 31 gauge x 5/16 Syrg       ipratropium (ATROVENT) 0.02 % nebulizer solution USE 1 VIAL VIA NEBULIZER FOUR TIMES DAILY  Qty: 937.5 mL, Refills: 2    Comments: **Patient requests 90 days supply**      isosorbide mononitrate (IMDUR) 60 MG 24 hr tablet Take 1 tablet (60 mg total) by mouth once daily.  Qty: 90 tablet, Refills: 3      ketorolac (TORADOL) 10 mg tablet Take 1 tablet (10 mg total) by mouth every 8 (eight) hours as needed for Pain.  Qty: 20 tablet, Refills: 0      lancets 32 gauge Misc 1 lancet by Misc.(Non-Drug; Combo Route) route 2 (two) times daily.  Qty: 100 each, Refills: 11      latanoprost 0.005 % ophthalmic solution INSTILL 1 DROP INTO BOTH EYES EVERY EVENING  Qty: 1 Bottle, Refills: 2    Associated Diagnoses: Primary open angle glaucoma of both eyes, indeterminate stage; Primary open angle glaucoma (POAG) of both eyes, moderate stage      lisinopril 10 MG tablet Take 1 tablet (10 mg total) by mouth once daily.  Qty: 90 tablet, Refills: 3      metFORMIN (GLUCOPHAGE-XR) 500 MG XR 24hr tablet Take 2 tablets (1,000 mg total) by mouth 2 (two) times daily with meals.  Qty: 360 tablet, Refills: 3    Associated Diagnoses: Uncontrolled type 2 diabetes mellitus with hyperglycemia      methylPREDNISolone (MEDROL DOSEPACK) 4 mg tablet Take as directed on the package.  Qty: 1 Package, Refills: 0      nitroGLYCERIN (NITROSTAT) 0.3 MG SL tablet PLACE 1 TABLET UNDER THE TONGUE EVERY 5 MINUTES AS  "NEEDED FOR CHEST PAIN, NO MORE THAN 3 TABLETS IN ONE DAY  Qty: 100 tablet, Refills: 0    Associated Diagnoses: Angina at rest      OZEMPIC 1 mg/dose (2 mg/1.5 mL) PnIj       pantoprazole (PROTONIX) 40 MG tablet Take 1 tablet (40 mg total) by mouth once daily.  Qty: 30 tablet, Refills: 11    Associated Diagnoses: Gastroesophageal reflux disease, esophagitis presence not specified      pen needle, diabetic (BD ULTRA-FINE SHORT PEN NEEDLE) 31 gauge x 5/16" Ndle Inject 1 each into the skin 3 (three) times daily.  Qty: 100 each, Refills: 11    Associated Diagnoses: Type 2 diabetes mellitus with mild nonproliferative retinopathy, with long-term current use of insulin, macular edema presence unspecified, unspecified laterality      predniSONE (DELTASONE) 5 MG tablet Take 1 tablet (5 mg total) by mouth 2 (two) times daily.  Qty: 60 tablet, Refills: 0    Associated Diagnoses: Dermatomyositis      !! sertraline (ZOLOFT) 100 MG tablet Take 1 tablet (100 mg total) by mouth every evening.  Qty: 90 tablet, Refills: 4      !! sertraline (ZOLOFT) 50 MG tablet TAKE 1 TABLET ONE TIME DAILY  Qty: 90 tablet, Refills: 3      silver sulfADIAZINE 1% (SILVADENE) 1 % cream Apply topically 2 (two) times daily.  Qty: 50 g, Refills: 0    Associated Diagnoses: Ischemic toe ulcer, left, with unspecified severity; Cellulitis of left foot      tamsulosin (FLOMAX) 0.4 mg Cap Take 1 capsule (0.4 mg total) by mouth once daily.  Qty: 30 capsule, Refills: 11    Associated Diagnoses: Benign prostatic hyperplasia with urinary hesitancy      tiotropium (SPIRIVA WITH HANDIHALER) 18 mcg inhalation capsule Inhale 1 capsule (18 mcg total) into the lungs once daily. Controller  Qty: 90 capsule, Refills: 0    Associated Diagnoses: Moderate asthma      traZODone (DESYREL) 100 MG tablet Take 2 tablets (200 mg total) by mouth every evening.  Qty: 180 tablet, Refills: 1      VIOS AEROSOL DELIVERY SYSTEM Shefali USE AS DIRESTED  Refills: 0       !! - Potential " duplicate medications found. Please discuss with provider.      STOP taking these medications       amoxicillin-clavulanate 875-125mg (AUGMENTIN) 875-125 mg per tablet Comments:   Reason for Stopping:               Review of patient's allergies indicates:   Allergen Reactions    Protamine Hives     Urticaria, possible upper airway swelling       Past Surgical History:   Procedure Laterality Date    ABLATION OF ARRHYTHMOGENIC FOCUS FOR ATRIAL FIBRILLATION N/A 2/27/2020    Procedure: Ablation atrial fibrillation;  Surgeon: Gio Brown MD;  Location: [facility] EP LAB;  Service: Cardiology;  Laterality: N/A;  afib, DAMIEN (cx if SR), PVI, PITO, anes, MB, 3 Prep    CHOLECYSTECTOMY      SELECTIVE INJECTION OF ANESTHETIC AGENT AROUND LUMBAR SPINAL NERVE ROOT BY TRANSFORAMINAL APPROACH Left 6/11/2020    Procedure: BLOCK, SPINAL NERVE ROOT, LUMBAR, SELECTIVE, TRANSFORAMINAL APPROACH Left L4-5, L5-S1 TFESI with RN IV sedation;  Surgeon: Dillan Tsai MD;  Location: [facility] MGT;  Service: Pain Management;  Laterality: Left;    TOE AMPUTATION Left 6/29/2020    Procedure: AMPUTATION, TOE;  Surgeon: Barb Veras DPM;  Location: [facility] OR;  Service: Podiatry;  Laterality: Left;  great toe       Family History   Problem Relation Age of Onset    Glaucoma Mother     Cataracts Mother     Cataracts Father     Glaucoma Sister     Cataracts Sister     Glaucoma Maternal Aunt     No Known Problems Brother     No Known Problems Daughter     No Known Problems Son     Prostate cancer Neg Hx        Social History     Socioeconomic History    Marital status: Legally      Spouse name: Not on file    Number of children: 5    Years of education: Not on file    Highest education level: Not on file   Occupational History    Occupation: disabled   Social Needs    Financial resource strain: Not on file    Food insecurity     Worry: Not on file     Inability: Not on file    Transportation needs     Medical: Not on file      Non-medical: Not on file   Tobacco Use    Smoking status: Never Smoker    Smokeless tobacco: Never Used   Substance and Sexual Activity    Alcohol use: No     Comment: occ    Drug use: No    Sexual activity: Yes     Partners: Female   Lifestyle    Physical activity     Days per week: Not on file     Minutes per session: Not on file    Stress: Rather much   Relationships    Social connections     Talks on phone: Not on file     Gets together: Not on file     Attends Jewish service: Not on file     Active member of club or organization: Not on file     Attends meetings of clubs or organizations: Not on file     Relationship status: Not on file   Other Topics Concern    Not on file   Social History Narrative    Not on file       Vitals:    07/01/20 0700 07/01/20 0712 07/01/20 0713 07/01/20 0750   BP:    (!) 144/80   Pulse: 85 85 85 87   Resp: 20 20 20 18   Temp:    98.6 °F (37 °C)   TempSrc:       SpO2: 97% 97% 97% 96%   Weight:       Height:           Hemoglobin A1C   Date Value Ref Range Status   05/19/2020 7.7 (H) 4.0 - 5.6 % Final     Comment:     ADA Screening Guidelines:  5.7-6.4%  Consistent with prediabetes  >or=6.5%  Consistent with diabetes  High levels of fetal hemoglobin interfere with the HbA1C  assay. Heterozygous hemoglobin variants (HbS, HgC, etc)do  not significantly interfere with this assay.   However, presence of multiple variants may affect accuracy.     12/13/2019 7.5 (H) 4.0 - 5.6 % Final     Comment:     ADA Screening Guidelines:  5.7-6.4%  Consistent with prediabetes  >or=6.5%  Consistent with diabetes  High levels of fetal hemoglobin interfere with the HbA1C  assay. Heterozygous hemoglobin variants (HbS, HgC, etc)do  not significantly interfere with this assay.   However, presence of multiple variants may affect accuracy.     08/08/2019 9.7 (H) 4.0 - 5.6 % Final     Comment:     ADA Screening Guidelines:  5.7-6.4%  Consistent with prediabetes  >or=6.5%  Consistent with  diabetes  High levels of fetal hemoglobin interfere with the HbA1C  assay. Heterozygous hemoglobin variants (HbS, HgC, etc)do  not significantly interfere with this assay.   However, presence of multiple variants may affect accuracy.         Review of Systems   Constitutional: Negative for chills and fever.   Respiratory: Negative for shortness of breath.    Cardiovascular: Negative for chest pain, palpitations, orthopnea, claudication and leg swelling.   Gastrointestinal: Negative for diarrhea, nausea and vomiting.   Musculoskeletal: Negative for joint pain.   Skin: Negative for rash.   Neurological: Positive for tingling and sensory change.   Psychiatric/Behavioral: Negative.              Objective:      Physical examination: General: Patient is in no acute distress, alert and oriented x 3.  Dressing to left foot clean, dry, and intact.   Patient seen at bedside resting.   Lower Extremity Exam:  Vascular: Dorsalis pedis and Posterior tibial pulses palpable on left foot.  Capillary fill time <5 sec to toes on left foot. none edema noted on left foot.   Dermatologic: Sutures Intact. Open wound clean with adequate wound bed and edges. Negative erythema, drainage, or increased temp noted to surgical site.   Neurological: Light touch sensation intact to left foot.   Musculoskeletal: Positive pain on palpation/ROM of left foot. Left hallux open amputation noted.           Assessment:       Gangrene of left foot  -     Case Request Operating Room: AMPUTATION, TOE; Standing  -     Case Request Operating Room: CLOSURE, WOUND; Standing    Toe pain  -     Cancel: EKG 12-lead; Standing  -     Cancel: US Lower Extremity Arteries Left; Standing  -     Cancel: US Lower Extremity Veins Left; Standing    Shortness of breath  -     EKG 12-lead; Standing    SOB (shortness of breath)  -     X-Ray Chest 1 View; Standing    Hypertension associated with diabetes    Chronic combined systolic and diastolic HFrEF of 45%  -     Ambulatory  referral/consult to Outpatient Case Management    Other orders  -     Cancel: ED Preference List Used to Initiate Sepsis Orders; Standing  -     Blood culture x two cultures. Draw prior to antibiotics.; Standing  -     Cancel: CBC auto differential; Standing  -     Comprehensive metabolic panel; Standing  -     Lactic acid, plasma #1; Standing  -     Cancel: Lactic acid, plasma #2; Standing  -     Cancel: Vital signs; Standing  -     Cancel: Bed rest; Standing  -     Cancel: Cardiac Monitoring - Adult; Standing  -     Cancel: Pulse Oximetry Continuous; Standing  -     Cancel: Strict intake and output; Standing  -     Urinalysis, Reflex to Urine Culture Urine, Clean Catch; Standing  -     Saline lock IV; Standing  -     Cancel: X-Ray Chest AP Portable; Standing  -     Cancel: X-Ray Toe 2 or More Views Left; Standing  -     Discontinue: vancomycin in dextrose 5 % 1 gram/250 mL IVPB 1,000 mg  -     Cancel: COVID-19 Routine Screening; Standing  -     Cancel: Pharmacy to dose Vancomycin consult; Standing  -     Discontinue: vancomycin - pharmacy to dose  -     vancomycin in dextrose 5 % 1 gram/250 mL IVPB 1,000 mg  -     vancomycin 750 mg in dextrose 5 % 250 mL IVPB (ready to mix system)  -     vancomycin 500 mg in dextrose 5 % 100 mL IVPB (ready to mix system)  -     morphine injection 4 mg  -     Transfer patient; Standing  -     Vital signs; Standing  -     Progressive Mobility Protocol (mobilize patient to their highest level of functioning at least twice daily); Standing  -     Notify physician ; Standing  -     sodium chloride 0.9% flush 10 mL  -     IP VTE HIGH RISK PATIENT; Standing  -     Place sequential compression device; Standing  -     Cancel: Pulse Oximetry Q4H; Standing  -     Full code; Standing  -     Cancel: Admit to Inpatient; Standing  -     COVID-19 Rapid Screening; Standing  -     Cancel: Troponin I; Standing  -     Cancel: CPK; Standing  -     Cancel: Protime-INR; Standing  -     Cancel: APTT;  Standing  -     Cancel: D dimer, quantitative; Standing  -     Cancel: Admit to Inpatient; Standing  -     Protime-INR; Standing  -     D dimer, quantitative; Standing  -     CPK; Standing  -     APTT; Standing  -     Troponin I; Standing  -     D dimer, quantitative; Standing  -     CK; Standing  -     Protime-INR; Standing  -     APTT; Standing  -     Troponin I; Standing  -     CTA Chest Non-Coronary (PE Study); Standing  -     iohexoL (OMNIPAQUE 350) injection 100 mL  -     Admit to Inpatient; Standing  -     Cancel: Vancomycin, random; Standing  -     Vancomycin, random; Standing  -     morphine injection 4 mg  -     PFC Facilitated Transportation Request; Standing  -     Troponin I; Standing  -     Inpatient consult to Podiatry; Standing  -     Strict intake and output; Standing  -     Daily weights; Standing  -     Cardiac Monitoring - Adult; Standing  -     Discontinue: dextrose 50% injection 12.5 g  -     Discontinue: glucagon (human recombinant) injection 1 mg  -     Cancel: Re-check Blood Glucose; Standing  -     Cancel: POCT glucose; Standing  -     Cancel: If any glucose result is less than 50 or greater than 400:; Standing  -     Cancel: If 2nd result is less than 50 or greater than 400:; Standing  -     Discontinue: insulin aspart U-100 pen 1-10 Units  -     Cancel: Diet diabetic [facility]; 2000 Calorie; Cardiac (Low Na/Chol); Fluid - 1500mL; Standing  -     Cancel: Diet NPO Except for: Medication; Standing  -     CBC auto differential; Standing  -     Basic metabolic panel; Standing  -     Troponin I; Standing  -     CK; Standing  -     aspirin EC tablet 81 mg  -     atorvastatin tablet 40 mg  -     diltiaZEM tablet 60 mg  -     Discontinue: fluticasone-salmeterol 250-50 mcg/dose diskus inhaler 1 puff  -     Discontinue: furosemide tablet 80 mg  -     Discontinue: furosemide tablet 40 mg  -     gabapentin capsule 800 mg  -     HYDROcodone-acetaminophen 5-325 mg per tablet 1 tablet  -      Discontinue: insulin detemir U-100 pen 40 Units  -     isosorbide mononitrate 24 hr tablet 60 mg  -     latanoprost 0.005 % ophthalmic solution 1 drop  -     lisinopriL tablet 10 mg  -     metoprolol succinate (TOPROL-XL) 24 hr tablet 50 mg  -     nitroGLYCERIN SL tablet 0.4 mg  -     pantoprazole EC tablet 40 mg  -     sertraline tablet 100 mg  -     tamsulosin 24 hr capsule 0.4 mg  -     Discontinue: tiotropium inhalation capsule 18 mcg  -     traZODone tablet 200 mg  -     ipratropium 0.02 % nebulizer solution 0.5 mg  -     HYDROcodone-acetaminophen  mg per tablet 1 tablet  -     acetaminophen tablet 650 mg  -     ondansetron injection 4 mg  -     Cancel: IP VTE HIGH RISK PATIENT; Standing  -     Reason for No Pharmacological VTE Prophylaxis; Standing  -     Cancel: Draw aPTT level 6 hours after start of infusion; adjust dosage and order aPTT based on the nomogram in hyperlink; Standing  -     Cancel: Do not administer any intramuscular injections, call physician for an alternative route or medication if medication is needed; Standing  -     Cancel: If bleeding occurs, or HIT is suspected, stop heparin infusion and contact physician; Standing  -     Cancel: APTT; Standing  -     Discontinue: heparin 25,000 units in dextrose 5% 250 mL (100 units/mL) infusion LOW INTENSITY nomogram - [facility]  -     Discontinue: heparin 25,000 units in dextrose 5% (100 units/ml) IV bolus from bag - ADDITIONAL PRN BOLUS - 60 units/kg  -     Discontinue: heparin 25,000 units in dextrose 5% (100 units/ml) IV bolus from bag - ADDITIONAL PRN BOLUS - 30 units/kg  -     POCT glucose; Standing  -     arformoteroL nebulizer solution 15 mcg  -     Inhalation Treatment BID; Standing  -     budesonide nebulizer solution 0.5 mg  -     POCT glucose; Standing  -     APTT; Standing  -     vancomycin 1.25 g in dextrose 5% 250 mL IVPB (ready to mix)  -     POCT glucose; Standing  -     morphine injection 2 mg  -     Cancel: Diet Cardiac;  Standing  -     Cancel: Diet NPO Except for: Medication; Standing  -     CARDIAC MONITORING STRIPS  -     Vancomycin, random; Standing  -     POCT glucose; Standing  -     POCT glucose; Standing  -     POCT glucose; Standing  -     Cancel: Diet Cardiac Thin; Standing  -     APTT; Standing  -     vancomycin 1.25 g in dextrose 5% 250 mL IVPB (ready to mix)  -     POCT glucose; Standing  -     Discontinue: cefepime in dextrose 5 % 1 gram/50 mL IVPB 1 g  -     0.9%  NaCl infusion  -     Cancel: Diet NPO Except for: Medication; Standing  -     APTT; Standing  -     insulin detemir U-100 pen 20 Units  -     Vancomycin, random; Standing  -     POCT glucose; Standing  -     POCT glucose; Standing  -     POCT glucose; Standing  -     Cancel: Admit to Phase 1 PACU, transfer to Phase 2 per protocol when indicated ; Standing  -     Cancel: Vital signs; Standing  -     Discontinue: hydromorphone (PF) injection 0.2 mg  -     Discontinue: oxyCODONE-acetaminophen 5-325 mg per tablet 1 tablet  -     Discontinue: ketorolac injection 15 mg  -     Discontinue: ondansetron injection 4 mg  -     Discontinue: promethazine (PHENERGAN) 6.25 mg in dextrose 5 % 50 mL IVPB  -     Cancel: Intake and output Per protocol; Standing  -     Cancel: Apply warming blanket; Standing  -     Cancel: Notify Anesthesiologist; Standing  -     Notify Physician - Potential Need of Opioid Reversal; Standing  -     Cancel: Oxygen Continuous; Standing  -     Cancel: Pulse Oximetry Continuous; Standing  -     Discontinue: lidocaine (PF) 10 mg/ml (1%) injection  -     Discontinue: bupivacaine (PF) 0.5% (5 mg/mL) injection  -     Culture, Anaerobe; Standing  -     Aerobic culture; Standing  -     Specimen to Pathology, Surgery Other; Standing  -     nozaseptin (NOZIN) nasal   -     Transfer patient; Standing  -     X-Ray Foot 2 View Left; Standing  -     Cancel: Diet Cardiac; Standing  -     Basic metabolic panel; Standing  -     Discontinue: vancomycin  1.5 g in dextrose 5 % 250 mL IVPB (ready to mix)  -     Cancel: VANCOMYCIN, TROUGH before next dose; Standing  -     Creatinine, serum; Standing  -     Discontinue: heparin 25,000 units in dextrose 5% (100 units/ml) IV bolus from bag - ADDITIONAL PRN BOLUS - 30 units/kg  -     Discontinue: heparin 25,000 units in dextrose 5% (100 units/ml) IV bolus from bag - ADDITIONAL PRN BOLUS - 60 units/kg  -     Discontinue: heparin 25,000 units in dextrose 5% 250 mL (100 units/mL) infusion LOW INTENSITY nomogram - [facility]  -     Discontinue: cefepime in dextrose 5 % 1 gram/50 mL IVPB 1 g  -     CBC auto differential; Standing  -     POCT glucose; Standing  -     POCT glucose; Standing  -     POCT glucose; Standing  -     Cancel: Diet NPO Except for: Medication; Standing  -     Inpatient consult to Vascular Surgery; Standing  -     Discontinue: 0.9%  NaCl infusion  -     Cancel: VANCOMYCIN, TROUGH before next dose; Standing  -     POCT glucose; Standing  -     POCT glucose; Standing  -     If any glucose result is less than 50 or greater than 400:; Standing  -     If 2nd result is less than 50 or greater than 400:; Standing  -     Do not admin Aspart correction between scheduled prandial Aspart; Standing  -     glucose chewable tablet 16 g  -     glucose chewable tablet 24 g  -     dextrose 50% injection 12.5 g  -     dextrose 50% injection 25 g  -     glucagon (human recombinant) injection 1 mg  -     Recheck Blood Glucose:; Standing  -     insulin aspart U-100 pen 1-10 Units  -     Vancomycin, random; Standing  -     POCT glucose; Standing  -     VANCOMYCIN, TROUGH before next dose; Standing  -     POCT glucose; Standing  -     POCT glucose; Standing  -     POCT glucose; Standing  -     PT evaluate and treat; Standing  -     OT evaluate and treat; Standing  -     Cancel: Admit to Phase 1 PACU, transfer to Phase 2 per protocol when indicated ; Standing  -     Cancel: Vital signs; Standing  -     Discontinue: hydromorphone (PF)  injection 0.2 mg  -     Discontinue: hydromorphone (PF) injection 0.2 mg  -     Discontinue: meperidine (PF) injection 12.5 mg  -     Discontinue: promethazine (PHENERGAN) 6.25 mg in dextrose 5 % 50 mL IVPB  -     Discontinue: metoclopramide HCl injection 10 mg  -     Discontinue: diphenhydrAMINE injection 25 mg  -     Cancel: Intake and output Per protocol; Standing  -     Cancel: Apply warming blanket; Standing  -     Cancel: Notify Physician - Potential Need of Opioid Reversal; Standing  -     Discontinue: sodium chloride 0.9% flush 3 mL  -     Cancel: Oxygen Continuous; Standing  -     Cancel: Pulse Oximetry Q4H; Standing  -     POCT glucose; Standing  -     Discontinue: bupivacaine (PF) 0.5% (5 mg/mL) injection  -     Discontinue: lidocaine (PF) 10 mg/ml (1%) injection  -     Inpatient consult to Social Work; Standing  -     POCT glucose; Standing  -     Transfer patient; Standing  -     Diet Cardiac; Standing  -     CBC auto differential; Standing  -     apixaban tablet 5 mg  -     doxycycline tablet 100 mg  -     amoxicillin-clavulanate 875-125mg per tablet 1 tablet          Plan:   Gangrene of left foot  -     Case Request Operating Room: AMPUTATION, TOE; Standing  -     Case Request Operating Room: CLOSURE, WOUND; Standing    Toe pain  -     Cancel: EKG 12-lead; Standing  -     Cancel: US Lower Extremity Arteries Left; Standing  -     Cancel: US Lower Extremity Veins Left; Standing    Shortness of breath  -     EKG 12-lead; Standing    SOB (shortness of breath)  -     X-Ray Chest 1 View; Standing    Hypertension associated with diabetes    Chronic combined systolic and diastolic HFrEF of 45%  -     Ambulatory referral/consult to Outpatient Case Management    Other orders  -     Cancel: ED Preference List Used to Initiate Sepsis Orders; Standing  -     Blood culture x two cultures. Draw prior to antibiotics.; Standing  -     Cancel: CBC auto differential; Standing  -     Comprehensive metabolic panel;  Standing  -     Lactic acid, plasma #1; Standing  -     Cancel: Lactic acid, plasma #2; Standing  -     Cancel: Vital signs; Standing  -     Cancel: Bed rest; Standing  -     Cancel: Cardiac Monitoring - Adult; Standing  -     Cancel: Pulse Oximetry Continuous; Standing  -     Cancel: Strict intake and output; Standing  -     Urinalysis, Reflex to Urine Culture Urine, Clean Catch; Standing  -     Saline lock IV; Standing  -     Cancel: X-Ray Chest AP Portable; Standing  -     Cancel: X-Ray Toe 2 or More Views Left; Standing  -     Discontinue: vancomycin in dextrose 5 % 1 gram/250 mL IVPB 1,000 mg  -     Cancel: COVID-19 Routine Screening; Standing  -     Pharmacy to dose Vancomycin consult; Standing  -     Discontinue: vancomycin - pharmacy to dose  -     vancomycin in dextrose 5 % 1 gram/250 mL IVPB 1,000 mg  -     vancomycin 750 mg in dextrose 5 % 250 mL IVPB (ready to mix system)  -     vancomycin 500 mg in dextrose 5 % 100 mL IVPB (ready to mix system)  -     morphine injection 4 mg  -     Transfer patient; Standing  -     Vital signs; Standing  -     Progressive Mobility Protocol (mobilize patient to their highest level of functioning at least twice daily); Standing  -     Notify physician ; Standing  -     sodium chloride 0.9% flush 10 mL  -     IP VTE HIGH RISK PATIENT; Standing  -     Place sequential compression device; Standing  -     Cancel: Pulse Oximetry Q4H; Standing  -     Full code; Standing  -     Cancel: Admit to Inpatient; Standing  -     COVID-19 Rapid Screening; Standing  -     Cancel: Troponin I; Standing  -     Cancel: CPK; Standing  -     Cancel: Protime-INR; Standing  -     Cancel: APTT; Standing  -     Cancel: D dimer, quantitative; Standing  -     Cancel: Admit to Inpatient; Standing  -     Protime-INR; Standing  -     D dimer, quantitative; Standing  -     CPK; Standing  -     APTT; Standing  -     Troponin I; Standing  -     D dimer, quantitative; Standing  -     CK; Standing  -      Protime-INR; Standing  -     APTT; Standing  -     Troponin I; Standing  -     CTA Chest Non-Coronary (PE Study); Standing  -     iohexoL (OMNIPAQUE 350) injection 100 mL  -     Admit to Inpatient; Standing  -     Cancel: Vancomycin, random; Standing  -     Vancomycin, random; Standing  -     morphine injection 4 mg  -     PFC Facilitated Transportation Request; Standing  -     Troponin I; Standing  -     Inpatient consult to Podiatry; Standing  -     Strict intake and output; Standing  -     Daily weights; Standing  -     Cardiac Monitoring - Adult; Standing  -     Discontinue: dextrose 50% injection 12.5 g  -     Discontinue: glucagon (human recombinant) injection 1 mg  -     Cancel: Re-check Blood Glucose; Standing  -     Cancel: POCT glucose; Standing  -     Cancel: If any glucose result is less than 50 or greater than 400:; Standing  -     Cancel: If 2nd result is less than 50 or greater than 400:; Standing  -     Discontinue: insulin aspart U-100 pen 1-10 Units  -     Cancel: Diet diabetic [facility]; 2000 Calorie; Cardiac (Low Na/Chol); Fluid - 1500mL; Standing  -     Cancel: Diet NPO Except for: Medication; Standing  -     CBC auto differential; Standing  -     Basic metabolic panel; Standing  -     Troponin I; Standing  -     CK; Standing  -     aspirin EC tablet 81 mg  -     atorvastatin tablet 40 mg  -     diltiaZEM tablet 60 mg  -     Discontinue: fluticasone-salmeterol 250-50 mcg/dose diskus inhaler 1 puff  -     Discontinue: furosemide tablet 80 mg  -     Discontinue: furosemide tablet 40 mg  -     gabapentin capsule 800 mg  -     HYDROcodone-acetaminophen 5-325 mg per tablet 1 tablet  -     Discontinue: insulin detemir U-100 pen 40 Units  -     isosorbide mononitrate 24 hr tablet 60 mg  -     latanoprost 0.005 % ophthalmic solution 1 drop  -     lisinopriL tablet 10 mg  -     metoprolol succinate (TOPROL-XL) 24 hr tablet 50 mg  -     nitroGLYCERIN SL tablet 0.4 mg  -     pantoprazole EC tablet  40 mg  -     sertraline tablet 100 mg  -     tamsulosin 24 hr capsule 0.4 mg  -     Discontinue: tiotropium inhalation capsule 18 mcg  -     traZODone tablet 200 mg  -     ipratropium 0.02 % nebulizer solution 0.5 mg  -     HYDROcodone-acetaminophen  mg per tablet 1 tablet  -     acetaminophen tablet 650 mg  -     ondansetron injection 4 mg  -     Cancel: IP VTE HIGH RISK PATIENT; Standing  -     Reason for No Pharmacological VTE Prophylaxis; Standing  -     Cancel: Draw aPTT level 6 hours after start of infusion; adjust dosage and order aPTT based on the nomogram in hyperlink; Standing  -     Cancel: Do not administer any intramuscular injections, call physician for an alternative route or medication if medication is needed; Standing  -     Cancel: If bleeding occurs, or HIT is suspected, stop heparin infusion and contact physician; Standing  -     Cancel: APTT; Standing  -     Discontinue: heparin 25,000 units in dextrose 5% 250 mL (100 units/mL) infusion LOW INTENSITY nomogram - [facility]  -     Discontinue: heparin 25,000 units in dextrose 5% (100 units/ml) IV bolus from bag - ADDITIONAL PRN BOLUS - 60 units/kg  -     Discontinue: heparin 25,000 units in dextrose 5% (100 units/ml) IV bolus from bag - ADDITIONAL PRN BOLUS - 30 units/kg  -     POCT glucose; Standing  -     arformoteroL nebulizer solution 15 mcg  -     Inhalation Treatment BID; Standing  -     budesonide nebulizer solution 0.5 mg  -     POCT glucose; Standing  -     APTT; Standing  -     vancomycin 1.25 g in dextrose 5% 250 mL IVPB (ready to mix)  -     POCT glucose; Standing  -     morphine injection 2 mg  -     Cancel: Diet Cardiac; Standing  -     Cancel: Diet NPO Except for: Medication; Standing  -     CARDIAC MONITORING STRIPS  -     Vancomycin, random; Standing  -     POCT glucose; Standing  -     POCT glucose; Standing  -     POCT glucose; Standing  -     Cancel: Diet Cardiac Thin; Standing  -     APTT; Standing  -     vancomycin 1.25 g in  dextrose 5% 250 mL IVPB (ready to mix)  -     POCT glucose; Standing  -     Discontinue: cefepime in dextrose 5 % 1 gram/50 mL IVPB 1 g  -     0.9%  NaCl infusion  -     Cancel: Diet NPO Except for: Medication; Standing  -     APTT; Standing  -     insulin detemir U-100 pen 20 Units  -     Vancomycin, random; Standing  -     POCT glucose; Standing  -     POCT glucose; Standing  -     POCT glucose; Standing  -     Admit to Phase 1 PACU, transfer to Phase 2 per protocol when indicated ; Standing  -     Cancel: Vital signs; Standing  -     Discontinue: hydromorphone (PF) injection 0.2 mg  -     Discontinue: oxyCODONE-acetaminophen 5-325 mg per tablet 1 tablet  -     Discontinue: ketorolac injection 15 mg  -     Discontinue: ondansetron injection 4 mg  -     Discontinue: promethazine (PHENERGAN) 6.25 mg in dextrose 5 % 50 mL IVPB  -     Cancel: Intake and output Per protocol; Standing  -     Apply warming blanket; Standing  -     Notify Anesthesiologist; Standing  -     Notify Physician - Potential Need of Opioid Reversal; Standing  -     Oxygen Continuous; Standing  -     Pulse Oximetry Continuous; Standing  -     Discontinue: lidocaine (PF) 10 mg/ml (1%) injection  -     Discontinue: bupivacaine (PF) 0.5% (5 mg/mL) injection  -     Culture, Anaerobe; Standing  -     Aerobic culture; Standing  -     Specimen to Pathology, Surgery Other; Standing  -     nozaseptin (NOZIN) nasal   -     Transfer patient; Standing  -     X-Ray Foot 2 View Left; Standing  -     Cancel: Diet Cardiac; Standing  -     Basic metabolic panel; Standing  -     vancomycin 1.5 g in dextrose 5 % 250 mL IVPB (ready to mix)  -     Cancel: VANCOMYCIN, TROUGH before next dose; Standing  -     Creatinine, serum; Standing  -     Discontinue: heparin 25,000 units in dextrose 5% (100 units/ml) IV bolus from bag - ADDITIONAL PRN BOLUS - 30 units/kg  -     Discontinue: heparin 25,000 units in dextrose 5% (100 units/ml) IV bolus from bag - ADDITIONAL  PRN BOLUS - 60 units/kg  -     Discontinue: heparin 25,000 units in dextrose 5% 250 mL (100 units/mL) infusion LOW INTENSITY nomogram - [facility]  -     Discontinue: cefepime in dextrose 5 % 1 gram/50 mL IVPB 1 g  -     CBC auto differential; Standing  -     POCT glucose; Standing  -     POCT glucose; Standing  -     POCT glucose; Standing  -     Diet NPO Except for: Medication; Standing  -     Inpatient consult to Vascular Surgery; Standing  -     Discontinue: 0.9%  NaCl infusion  -     Cancel: VANCOMYCIN, TROUGH before next dose; Standing  -     POCT glucose; Standing  -     POCT glucose; Standing  -     If any glucose result is less than 50 or greater than 400:; Standing  -     If 2nd result is less than 50 or greater than 400:; Standing  -     Do not admin Aspart correction between scheduled prandial Aspart; Standing  -     glucose chewable tablet 16 g  -     glucose chewable tablet 24 g  -     dextrose 50% injection 12.5 g  -     dextrose 50% injection 25 g  -     glucagon (human recombinant) injection 1 mg  -     Recheck Blood Glucose:; Standing  -     insulin aspart U-100 pen 1-10 Units  -     Vancomycin, random; Standing  -     POCT glucose; Standing  -     VANCOMYCIN, TROUGH before next dose; Standing  -     POCT glucose; Standing  -     POCT glucose; Standing  -     POCT glucose; Standing  -     PT evaluate and treat; Standing  -     OT evaluate and treat; Standing  -     POCT glucose; Standing      I counseled the patient on his conditions, regarding findings of my examination, my impressions, and usual treatment plan.   Patient to OR 07/10/2020 for surgical management for secondary closer of left hallux open amputation. All concerns and questions answered by myself, Dr. Rito DPM. No gurantees given nor implied.               Barb Veras DPM  [facility]

## 2020-07-01 NOTE — PT/OT/SLP PROGRESS
Occupational Therapy      Patient Name:  Crow Green   MRN:  [id number]    Eval initiated via chart review. Pt currently in sx. Will complete eval when medically appropriate. Therapy team member reported Dr. Campbell reported hold til tomorrow.      Angélica Dill, GUDELIA  7/1/2020   1030

## 2020-07-01 NOTE — PLAN OF CARE
Report received from floor nurse and tereza dunn rn. Pt escorted to preop via stretcher and accompanied by rn on tele monitor showing sinus rhythm. preop rn noted left arm iv infiltrated. Iv d/c'd and new iv to right forearm inserted by tereza dunn rn. preop completed without incident. During preop visit noted pt takes care of his dad at home who has had a stroke. Pt told rn he does not have help at home. No family or friends to help him or his dad. Will place order for case management to assist pt with community resources upon discharge. It is also noted that his dad is at home alone. Pt reports he cannot use the stove but is able to ambulate. When asked where is your dad with you in the hospital he stated I guess at home.

## 2020-07-02 LAB
ANION GAP SERPL CALC-SCNC: 6 MMOL/L (ref 8–16)
BACTERIA BLD CULT: NORMAL
BACTERIA BLD CULT: NORMAL
BACTERIA SPEC AEROBE CULT: ABNORMAL
BACTERIA SPEC AEROBE CULT: ABNORMAL
BASOPHILS # BLD AUTO: 0.02 K/UL (ref 0–0.2)
BASOPHILS NFR BLD: 0.2 % (ref 0–1.9)
BUN SERPL-MCNC: 17 MG/DL (ref 8–23)
CALCIUM SERPL-MCNC: 8.6 MG/DL (ref 8.7–10.5)
CHLORIDE SERPL-SCNC: 99 MMOL/L (ref 95–110)
CO2 SERPL-SCNC: 31 MMOL/L (ref 23–29)
CREAT SERPL-MCNC: 1.1 MG/DL (ref 0.5–1.4)
DIFFERENTIAL METHOD: ABNORMAL
EOSINOPHIL # BLD AUTO: 0.2 K/UL (ref 0–0.5)
EOSINOPHIL NFR BLD: 2.1 % (ref 0–8)
ERYTHROCYTE [DISTWIDTH] IN BLOOD BY AUTOMATED COUNT: 14.2 % (ref 11.5–14.5)
EST. GFR  (AFRICAN AMERICAN): >60 ML/MIN/1.73 M^2
EST. GFR  (NON AFRICAN AMERICAN): >60 ML/MIN/1.73 M^2
FINAL PATHOLOGIC DIAGNOSIS: NORMAL
GLUCOSE SERPL-MCNC: 218 MG/DL (ref 70–110)
GROSS: NORMAL
HCT VFR BLD AUTO: 28.3 % (ref 40–54)
HGB BLD-MCNC: 8.7 G/DL (ref 14–18)
IMM GRANULOCYTES # BLD AUTO: 0.11 K/UL (ref 0–0.04)
IMM GRANULOCYTES NFR BLD AUTO: 1.1 % (ref 0–0.5)
LYMPHOCYTES # BLD AUTO: 1.7 K/UL (ref 1–4.8)
LYMPHOCYTES NFR BLD: 16.7 % (ref 18–48)
MCH RBC QN AUTO: 27 PG (ref 27–31)
MCHC RBC AUTO-ENTMCNC: 30.7 G/DL (ref 32–36)
MCV RBC AUTO: 88 FL (ref 82–98)
MONOCYTES # BLD AUTO: 0.9 K/UL (ref 0.3–1)
MONOCYTES NFR BLD: 8.7 % (ref 4–15)
NEUTROPHILS # BLD AUTO: 7.4 K/UL (ref 1.8–7.7)
NEUTROPHILS NFR BLD: 71.2 % (ref 38–73)
NRBC BLD-RTO: 0 /100 WBC
PLATELET # BLD AUTO: 269 K/UL (ref 150–350)
PMV BLD AUTO: 10.4 FL (ref 9.2–12.9)
POCT GLUCOSE: 207 MG/DL (ref 70–110)
POCT GLUCOSE: 252 MG/DL (ref 70–110)
POCT GLUCOSE: 296 MG/DL (ref 70–110)
POCT GLUCOSE: 298 MG/DL (ref 70–110)
POTASSIUM SERPL-SCNC: 4.5 MMOL/L (ref 3.5–5.1)
RBC # BLD AUTO: 3.22 M/UL (ref 4.6–6.2)
SODIUM SERPL-SCNC: 136 MMOL/L (ref 136–145)
WBC # BLD AUTO: 10.37 K/UL (ref 3.9–12.7)

## 2020-07-02 PROCEDURE — 25000003 PHARM REV CODE 250: Mod: HCNC | Performed by: INTERNAL MEDICINE

## 2020-07-02 PROCEDURE — 27000221 HC OXYGEN, UP TO 24 HOURS: Mod: HCNC

## 2020-07-02 PROCEDURE — 94761 N-INVAS EAR/PLS OXIMETRY MLT: CPT | Mod: HCNC

## 2020-07-02 PROCEDURE — 80048 BASIC METABOLIC PNL TOTAL CA: CPT | Mod: HCNC

## 2020-07-02 PROCEDURE — 25000242 PHARM REV CODE 250 ALT 637 W/ HCPCS: Mod: HCNC | Performed by: PODIATRIST

## 2020-07-02 PROCEDURE — 63600175 PHARM REV CODE 636 W HCPCS: Mod: HCNC | Performed by: PODIATRIST

## 2020-07-02 PROCEDURE — 85025 COMPLETE CBC W/AUTO DIFF WBC: CPT | Mod: HCNC

## 2020-07-02 PROCEDURE — 97168 OT RE-EVAL EST PLAN CARE: CPT | Mod: HCNC | Performed by: PHYSICAL THERAPIST

## 2020-07-02 PROCEDURE — 97530 THERAPEUTIC ACTIVITIES: CPT | Mod: HCNC | Performed by: PHYSICAL THERAPIST

## 2020-07-02 PROCEDURE — 99900037 HC PT THERAPY SCREENING (STAT): Mod: HCNC

## 2020-07-02 PROCEDURE — 21400001 HC TELEMETRY ROOM: Mod: HCNC

## 2020-07-02 PROCEDURE — 94640 AIRWAY INHALATION TREATMENT: CPT | Mod: HCNC

## 2020-07-02 PROCEDURE — 97166 OT EVAL MOD COMPLEX 45 MIN: CPT | Mod: HCNC | Performed by: PHYSICAL THERAPIST

## 2020-07-02 PROCEDURE — 97162 PT EVAL MOD COMPLEX 30 MIN: CPT | Mod: HCNC

## 2020-07-02 PROCEDURE — 36415 COLL VENOUS BLD VENIPUNCTURE: CPT | Mod: HCNC

## 2020-07-02 PROCEDURE — 25000003 PHARM REV CODE 250: Mod: HCNC | Performed by: PODIATRIST

## 2020-07-02 PROCEDURE — 97116 GAIT TRAINING THERAPY: CPT | Mod: HCNC

## 2020-07-02 PROCEDURE — 25000242 PHARM REV CODE 250 ALT 637 W/ HCPCS: Mod: HCNC | Performed by: INTERNAL MEDICINE

## 2020-07-02 RX ORDER — IPRATROPIUM BROMIDE AND ALBUTEROL SULFATE 2.5; .5 MG/3ML; MG/3ML
3 SOLUTION RESPIRATORY (INHALATION) EVERY 6 HOURS PRN
Status: DISCONTINUED | OUTPATIENT
Start: 2020-07-02 | End: 2020-07-03 | Stop reason: HOSPADM

## 2020-07-02 RX ADMIN — TAMSULOSIN HYDROCHLORIDE 0.4 MG: 0.4 CAPSULE ORAL at 08:07

## 2020-07-02 RX ADMIN — AMOXICILLIN AND CLAVULANATE POTASSIUM 1 TABLET: 875; 125 TABLET, FILM COATED ORAL at 08:07

## 2020-07-02 RX ADMIN — IPRATROPIUM BROMIDE 0.5 MG: 0.5 SOLUTION RESPIRATORY (INHALATION) at 08:07

## 2020-07-02 RX ADMIN — LATANOPROST 1 DROP: 50 SOLUTION OPHTHALMIC at 08:07

## 2020-07-02 RX ADMIN — IPRATROPIUM BROMIDE 0.5 MG: 0.5 SOLUTION RESPIRATORY (INHALATION) at 07:07

## 2020-07-02 RX ADMIN — GABAPENTIN 800 MG: 400 CAPSULE ORAL at 08:07

## 2020-07-02 RX ADMIN — BUDESONIDE 0.5 MG: 0.5 SUSPENSION RESPIRATORY (INHALATION) at 08:07

## 2020-07-02 RX ADMIN — APIXABAN 5 MG: 2.5 TABLET, FILM COATED ORAL at 08:07

## 2020-07-02 RX ADMIN — METOPROLOL SUCCINATE 50 MG: 50 TABLET, EXTENDED RELEASE ORAL at 08:07

## 2020-07-02 RX ADMIN — HYDROCODONE BITARTRATE AND ACETAMINOPHEN 1 TABLET: 5; 325 TABLET ORAL at 04:07

## 2020-07-02 RX ADMIN — INSULIN ASPART 4 UNITS: 100 INJECTION, SOLUTION INTRAVENOUS; SUBCUTANEOUS at 06:07

## 2020-07-02 RX ADMIN — GABAPENTIN 800 MG: 400 CAPSULE ORAL at 03:07

## 2020-07-02 RX ADMIN — INSULIN ASPART 6 UNITS: 100 INJECTION, SOLUTION INTRAVENOUS; SUBCUTANEOUS at 11:07

## 2020-07-02 RX ADMIN — ATORVASTATIN CALCIUM 40 MG: 40 TABLET, FILM COATED ORAL at 08:07

## 2020-07-02 RX ADMIN — IPRATROPIUM BROMIDE 0.5 MG: 0.5 SOLUTION RESPIRATORY (INHALATION) at 12:07

## 2020-07-02 RX ADMIN — SERTRALINE HYDROCHLORIDE 100 MG: 50 TABLET ORAL at 08:07

## 2020-07-02 RX ADMIN — INSULIN ASPART 6 UNITS: 100 INJECTION, SOLUTION INTRAVENOUS; SUBCUTANEOUS at 04:07

## 2020-07-02 RX ADMIN — MORPHINE SULFATE 2 MG: 2 INJECTION, SOLUTION INTRAMUSCULAR; INTRAVENOUS at 12:07

## 2020-07-02 RX ADMIN — INSULIN DETEMIR 20 UNITS: 100 INJECTION, SOLUTION SUBCUTANEOUS at 08:07

## 2020-07-02 RX ADMIN — DILTIAZEM HYDROCHLORIDE 60 MG: 60 TABLET, FILM COATED ORAL at 08:07

## 2020-07-02 RX ADMIN — IPRATROPIUM BROMIDE AND ALBUTEROL SULFATE 3 ML: .5; 3 SOLUTION RESPIRATORY (INHALATION) at 04:07

## 2020-07-02 RX ADMIN — PANTOPRAZOLE SODIUM 40 MG: 40 TABLET, DELAYED RELEASE ORAL at 08:07

## 2020-07-02 RX ADMIN — ARFORMOTEROL TARTRATE 15 MCG: 15 SOLUTION RESPIRATORY (INHALATION) at 08:07

## 2020-07-02 RX ADMIN — BUDESONIDE 0.5 MG: 0.5 SUSPENSION RESPIRATORY (INHALATION) at 07:07

## 2020-07-02 RX ADMIN — HYDROCODONE BITARTRATE AND ACETAMINOPHEN 1 TABLET: 10; 325 TABLET ORAL at 06:07

## 2020-07-02 RX ADMIN — MORPHINE SULFATE 2 MG: 2 INJECTION, SOLUTION INTRAMUSCULAR; INTRAVENOUS at 11:07

## 2020-07-02 RX ADMIN — ASPIRIN 81 MG: 81 TABLET, COATED ORAL at 08:07

## 2020-07-02 RX ADMIN — DOXYCYCLINE HYCLATE 100 MG: 100 TABLET, COATED ORAL at 08:07

## 2020-07-02 RX ADMIN — INSULIN ASPART 3 UNITS: 100 INJECTION, SOLUTION INTRAVENOUS; SUBCUTANEOUS at 08:07

## 2020-07-02 RX ADMIN — ARFORMOTEROL TARTRATE 15 MCG: 15 SOLUTION RESPIRATORY (INHALATION) at 07:07

## 2020-07-02 NOTE — PT/OT/SLP PROGRESS
Physical Therapy      Patient Name:  Crow Green   MRN:  [id number]    P.TSergio GARAY INITIATED THIS AM VIA CHART REVIEW, PT CURRENTLY GETTING BREATHING TX., WILL RE-ATTEMPT EVAL LATER THIS AM    Linda Pickett, PT   7/2/2020  0862

## 2020-07-02 NOTE — PT/OT/SLP PROGRESS
Occupational Therapy      Patient Name:  Crow Green   MRN:  [id number]    OT eval attempted at 8:25am, pt receiving breathing tx at this time. Returned at 8:40am, pt adamantly refused stated that he was not doing therapy today because he was not ready. OT Eval initiated through chart review and patient interview, will attempt to complete eval at later time/ date.     Erika Gardner, PT/OT  7/2/2020

## 2020-07-02 NOTE — ASSESSMENT & PLAN NOTE
- Monitor telemetry.  - Continue home Toprol XL and diltiazem.   - D/C heparin drip  -Resume elehiwotis

## 2020-07-02 NOTE — ASSESSMENT & PLAN NOTE
- Troponin stable at baseline.  No CP or anginal equivalent.  EKG unrevealing.  - Patient with known nonobstructive CAD per [facility] in 2016.  - Continue ASA, and statin.

## 2020-07-02 NOTE — NURSING
C/o chest pressure/pain and SOB. Started O2 at 1L. SPO2 99%. Dr. Campbell notified. Calling RT to do PRN breathing treatment. Give PRN norco per Dr. Campbell.

## 2020-07-02 NOTE — PLAN OF CARE
"Pt AAO x4.  Pt remains free of falls throughout shift.  Plan of care reviewed. Pt verbalizes understanding.  Pt moving and turning independently. Frequent weight shifting encouraged.  Normal sinus rhythm on monitor.  Hourly rounding completed.  Boot on left foot. Reminded pt to walk with weight on heel. Bed alarm on and pt instructed to call for assistance when getting out of bed. Pt said, "ok"  Dressing changed on L foot  Will continue to monitor.   "

## 2020-07-02 NOTE — PT/OT/SLP EVAL
Occupational Therapy   Evaluation    Name: Crow Green  MRN: [id number]  Admitting Diagnosis:  Gangrene of left foot 1 Day Post-Op    Recommendations:     Discharge Recommendations: home with home health  Discharge Equipment Recommendations:  walker, rolling  Barriers to discharge:  Decreased caregiver support    Assessment:     Crow Green is a 65 y.o. male with a medical diagnosis of Gangrene of left foot.  He presents with impaired functional mobility and ADLs. Performance deficits affecting function: weakness, impaired endurance, impaired self care skills, impaired functional mobilty, gait instability, impaired balance, decreased upper extremity function, decreased lower extremity function, decreased safety awareness, pain.      Rehab Prognosis: Good; patient would benefit from acute skilled OT services to address these deficits and reach maximum level of function.       Plan:     Patient to be seen 3 x/week to address the above listed problems via self-care/home management, therapeutic activities, therapeutic exercises  · Plan of Care Expires:  7/9/2020  · Plan of Care Reviewed with: patient    Subjective     Chief Complaint: (R) LE Pain  Patient/Family Comments/goals: to get stronger    Occupational Profile:  Living Environment: lives with father in 1 story house with 3-4 steps and no handrail  Previous level of function: Mod I with ADLs, ambulated with Rollator  Roles and Routines: Does not drive or work  Equipment Used at Home:  rollator  Assistance upon Discharge: unknown    Pain/Comfort:  · Pain Rating 1: 10/10  · Location - Side 1: Right  · Location 1: foot  · Pain Addressed 1: Reposition, Distraction, Pre-medicate for activity    Patients cultural, spiritual, Rastafarian conflicts given the current situation:      Objective:     Communicated with: Nurse Banks and epic chart review prior to session.  Patient found supine with bed alarm, peripheral IV, telemetry upon OT entry to  room.    General Precautions: Standard, fall   Orthopedic Precautions:((L) LE heel WB)   Braces: (post op shoe)     Occupational Performance:    Bed Mobility:    · Patient completed Rolling/Turning to Left with  stand by assistance  · Patient completed Rolling/Turning to Right with stand by assistance  · Patient completed Scooting/Bridging with stand by assistance  · Patient completed Supine to Sit with stand by assistance    Functional Mobility/Transfers:  · Patient completed Sit <> Stand Transfer with contact guard assistance  with  rolling walker   · Patient completed Bed <> Chair Transfer using Step Transfer technique with contact guard assistance with rolling walker  · Functional Mobility: ~10ft using RW with CGA, declined further secondary to increased (R) foot pain    Activities of Daily Living:  · Upper Body Dressing: moderate assistance .  · Lower Body Dressing: moderate assistance .    Cognitive/Visual Perceptual:  Cognitive/Psychosocial Skills:     -       Oriented to: Person, Place, Time and Situation   -       Follows Commands/attention:Follows multistep  commands  -       Communication: clear/fluent  -       Memory: No Deficits noted  -       Safety awareness/insight to disability: impaired   Visual/Perceptual:      -Intact ,    Physical Exam:  Dominant hand:    -       right  Upper Extremity Range of Motion:     -       Right Upper Extremity: decreased at shoulders  -       Left Upper Extremity: decreased at shoulders  Upper Extremity Strength:    -       Right Upper Extremity: 4/5 grossly  -       Left Upper Extremity: 4/5 grossly   Strength:    -       Right Upper Extremity: WFL  -       Left Upper Extremity: WFL    AMPAC 6 Click ADL:  AMPAC Total Score: 17    Treatment & Education:  OT Eval completed as listed above. Pt educated on role of OT and POC.   Education:    Patient left up in chair with all lines intact, call button in reach, chair alarm on, Nurse Jocelyn notified and (R) LE elevated on  sofa cushions    GOALS:   Multidisciplinary Problems     Occupational Therapy Goals        Problem: Occupational Therapy Goal    Goal Priority Disciplines Outcome Interventions   Occupational Therapy Goal     OT, PT/OT     Description: LTGs to be met by 7/9/2020  1. Pt will perform UE dressing with Min A  2. Pt will perform LE dressing with Min A  3. Pt will perform toilet t/f with Supervision  4. Pt will perform (B) UE ROM therapeutic exercises 1 x 20 reps                   History:     Past Medical History:   Diagnosis Date    Acute kidney injury     Arthritis     Asthma     Atrial fibrillation     Atrial fibrillation with RVR 8/7/2019    CHF (congestive heart failure)     Depression     Diabetes mellitus, type 2 Diagnosed in 2000    Elevated PSA     Hypertension     Sleep apnea        Past Surgical History:   Procedure Laterality Date    ABLATION OF ARRHYTHMOGENIC FOCUS FOR ATRIAL FIBRILLATION N/A 2/27/2020    Procedure: Ablation atrial fibrillation;  Surgeon: Gio Brown MD;  Location: [facility] EP LAB;  Service: Cardiology;  Laterality: N/A;  afib, DAMIEN (cx if SR), PVI, PITO, anes, MB, 3 Prep    CHOLECYSTECTOMY      CLOSURE OF WOUND Left 7/1/2020    Procedure: CLOSURE, WOUND;  Surgeon: Barb Veras DPM;  Location: [facility] OR;  Service: Podiatry;  Laterality: Left;    SELECTIVE INJECTION OF ANESTHETIC AGENT AROUND LUMBAR SPINAL NERVE ROOT BY TRANSFORAMINAL APPROACH Left 6/11/2020    Procedure: BLOCK, SPINAL NERVE ROOT, LUMBAR, SELECTIVE, TRANSFORAMINAL APPROACH Left L4-5, L5-S1 TFESI with RN IV sedation;  Surgeon: Dillan Tsai MD;  [facility];  Service: Pain Management;  Laterality: Left;    TOE AMPUTATION Left 6/29/2020    Procedure: AMPUTATION, TOE;  Surgeon: Barb Veras DPM;  Location: [facility] OR;  Service: Podiatry;  Laterality: Left;  great toe       Time Tracking:     OT Date of Treatment: 07/02/20  OT Start Time: 1110  OT Stop Time: 1135  OT Total Time (min): 25  min    Billable Minutes:Evaluation 15 min  Therapeutic Activity 10 min    Erika Gardner, PT/OT  7/2/2020

## 2020-07-02 NOTE — PROGRESS NOTES
[facility] Hospital Medicine  Progress Note    Patient Name: Crow Green  MRN: [id number]  Patient Class: IP- Inpatient   Admission Date: 6/26/2020  Length of Stay: 6 days  Attending Physician: Anthony Hester, *  Primary Care Provider: Mateo Lambert DO        Subjective:     Principal Problem:Gangrene of left foot        HPI:  Mr. Green is a 66 yo male with a PMHx of DM II, HTN, HLD, SANDRITA, obesity, PAF on Eliquis, nonischemic CMPY with LVEF of 45%, nonobstructive CAD, and asthma.  He presented to the ED with c/o pain to left great toe that has progressively worsened over the past 1 week.  He was seen by Dr. Veras (Podiatry) last week and prescribed Augmentin and a foot boot.  Patient reports being compliant with antibiotics, but has seen no improvement in left great toe wound.  Associated toe swelling, redness, warmth, black color change, and chills.  The symptoms are aggravated by movement and activity; no alleviating factors.  Denies any drainage, foul odor, weakness, numbness/tingling, foot or ankle pain/swelling, CP, SOB, ABD pain, N/V/D, back pain, HA, lightheadedness, dizziness, syncope, fever or chills.  ED evaluation of left great toe was consistent with gangrene.  Labs showed WBC 18.95, 0% bands, and normal lactic.  Blood cultures were drawn in the ED and IV vanc given.  Hospital Medicine was contacted for admission.       Overview/Hospital Course:  65 y/p aam admitted with a Dx of Left foot gangrene  And  Left foot cellulitis . He was started on Broad spectrum IVAB . Podiatrist was consulted . He had a recent arterial and  Venous LE US which did not show any acute finding .The Blood cx are NGTD.  6/28 Pt was seen and examined at bedside . He is complaining of severe pain  Of the left toe . Podiatrist  Plan for surgical resection of great toe with possible graft application/WoundVac application with Dr. Barb Veras on Monday . The blood cx are NGTD   6/29 He is s/p  Left toe amputation today . The blood cx are NGTD . He is on cefepime and vanc . We will de escalate  Tomorrow am .  He report the pain is better . There was no acute event overnight .  6/30 He is s/p Left Hallux Amputation . The wound cx is (+) for Staph . He will be taken to the OR tomorrow  For closure .  The BP has been borderline low .  7/1 He is s/p wound closure today . He cont with left leg pain .  There was no acute event overnight     Interval History:     Review of Systems   Constitutional: Negative for chills, diaphoresis, fatigue and fever.   HENT: Negative for congestion, postnasal drip, rhinorrhea, sinus pressure and sore throat.    Respiratory: Negative for cough, shortness of breath and wheezing.    Cardiovascular: Negative for chest pain, palpitations and leg swelling.   Gastrointestinal: Negative for abdominal pain, diarrhea, nausea and vomiting.   Genitourinary: Negative for dysuria and hematuria.   Musculoskeletal: Positive for arthralgias. Negative for back pain and myalgias.   Skin: Positive for wound. Negative for color change, pallor and rash.   Neurological: Negative for dizziness, syncope, weakness, light-headedness, numbness and headaches.   All other systems reviewed and are negative.    Objective:     Vital Signs (Most Recent):  Temp: 99.2 °F (37.3 °C) (07/02/20 1150)  Pulse: 81 (07/02/20 1241)  Resp: 18 (07/02/20 1241)  BP: 127/70 (07/02/20 1150)  SpO2: 95 % (07/02/20 1241) Vital Signs (24h Range):  Temp:  [97.8 °F (36.6 °C)-99.2 °F (37.3 °C)] 99.2 °F (37.3 °C)  Pulse:  [80-88] 81  Resp:  [16-20] 18  SpO2:  [93 %-98 %] 95 %  BP: (100-127)/(50-70) 127/70     Weight: 93.9 kg (207 lb 0.2 oz)  Body mass index is 34.45 kg/m².    Intake/Output Summary (Last 24 hours) at 7/2/2020 1405  Last data filed at 7/1/2020 2100  Gross per 24 hour   Intake 478 ml   Output 1200 ml   Net -722 ml      Physical Exam  Vitals signs and nursing note reviewed.   Constitutional:       General: He is not in acute  distress.     Appearance: He is well-developed. He is not diaphoretic.   HENT:      Head: Normocephalic and atraumatic.   Eyes:      Conjunctiva/sclera: Conjunctivae normal.   Neck:      Musculoskeletal: Normal range of motion and neck supple.   Cardiovascular:      Rate and Rhythm: Normal rate and regular rhythm.      Pulses:           Dorsalis pedis pulses are 2+ on the right side and detected w/ Doppler on the left side.        Posterior tibial pulses are 2+ on the right side and 2+ on the left side.      Heart sounds: S1 normal and S2 normal. No murmur.   Pulmonary:      Effort: Pulmonary effort is normal. No tachypnea, accessory muscle usage or respiratory distress.      Breath sounds: No wheezing, rhonchi or rales.   Abdominal:      General: Bowel sounds are normal. There is no distension.      Palpations: Abdomen is soft.      Tenderness: There is no abdominal tenderness.   Musculoskeletal: Normal range of motion.   Feet:      Left foot:      Skin integrity: No ulcer, skin breakdown, erythema or warmth.      Comments: Left toe amputation . Covered .  Skin:     General: Skin is warm.      Capillary Refill: Capillary refill takes less than 2 seconds.      Findings: No rash.   Neurological:      Mental Status: He is alert and oriented to person, place, and time.   Psychiatric:         Behavior: Behavior normal.         Significant Labs: All pertinent labs within the past 24 hours have been reviewed.    Significant Imaging: I have reviewed all pertinent imaging results/findings within the past 24 hours.      Assessment/Plan:      * Gangrene of left foot  - Continue empiric IV vanc.  Pharmacy consult for dosing.  - Analgesics as needed.  - Podiatry consult.  S/P Left toe amputation     Chronic troponin elevation  - Troponin stable at baseline.  No CP or anginal equivalent.  EKG unrevealing.  - Patient with known nonobstructive CAD per [facility] in 2016.  - Continue ASA, and statin.    Diabetic ulcer of toe of left foot  associated with type 2 diabetes mellitus, with necrosis of muscle  - Cont IV vanc   -Podiatrist consulted . S/P Left toe amputation   -Wound Cx staph aureus     Stage 3 chronic kidney disease  - Creatinine stable at baseline.  Avoid nephrotoxic agents.  Follow daily labs.    PAF (paroxysmal atrial fibrillation)  - Monitor telemetry.  - Continue home Toprol XL and diltiazem.   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  - Cont levemir 20 unit qhs   - AccuChecks with moderate dose SSI.  - Diabetic diet.  - Recent HbA1c of 7.7.    Chronic combined systolic and diastolic HFrEF of 45%  - Clinically compensated   - Strict I&O's, daily weights, Na/fluid restriction.     Hypertension associated with diabetes  -cont current BP medication   -D/C lasix         VTE Risk Mitigation (From admission, onward)         Ordered     apixaban tablet 5 mg  2 times daily      07/01/20 1446     IP VTE HIGH RISK PATIENT  Once      06/26/20 2324     Reason for No Pharmacological VTE Prophylaxis  Once     Question:  Reasons:  Answer:  Already adequately anticoagulated on oral Anticoagulants    06/26/20 2217     Place sequential compression device  Until discontinued      06/26/20 2213                      Anthony Hester MD  Department of Hospital Medicine   [facility]

## 2020-07-02 NOTE — PHYSICIAN QUERY
PT Name: Crow Green  MR #: [id number]     Physician Query Form - Documentation Clarification      CDS: Bertha Calderon RN, CCDS  Contact information: [email or web address]    This form is a permanent document in the medical record.     Query Date: July 2, 2020    By submitting this query, we are merely seeking further clarification of documentation. Please utilize your independent clinical judgment when addressing the question(s) below.    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  - Continue basal insulin at 40 units nightly.  - AccuChecks with moderate dose SSI.  - Diabetic diet.  - Recent HbA1c of 7.7.    Hypertension associated with diabetes  - BP stable.  Continue home lisinopril, Imdur, Toprol XL, diltiazem, and Lasix.     H&P 6/27   Patient has had Type II diabetes since 2000.  Pertinent to decision making is the following comorbidities: HTN, HLD, CAD, CHF, CKD III, Obesity by BMI, ED and Parathryoid disorder and Hypogonadism   Patient has the following Diabetes complications: with diabetic chronic kidney disease, with diabetic polyneuropathy and with diabetic retinopathy; mild non-proliferative; without macular edema     Office Visit 5/19/20  Diabetes, RENAE Castillo                                                                            Doctor, please confirm the meaning of hypertension associated with DM.    Provider Use Only      [  ] HTN is a manifestation of DM (Secondary HTN)    [  ] HTN is not a manifestation of DM (Essential HTN)    [ x ] Other: ___essential HTN___________________________                                                                                                               [  ] Clinically Undetermined

## 2020-07-02 NOTE — PLAN OF CARE
POC reviewed, verbalized understanding. Pt remained free from falls, fall precautions in place. Pt is NSR on monitor, 8632. VSS. Complaind of left foot pain, prn morphine and norco 10 given. PIV intact. RA. Continue monitoring blood glucose. Call bell and personal belongings within reach. Hourly rounding complete. Reminded to call for assistance. Will continue to monitor.

## 2020-07-02 NOTE — PT/OT/SLP EVAL
Physical Therapy Evaluation    Patient Name:  Crow Green   MRN:  [id number]    Recommendations:     Discharge Recommendations:  home health PT   Discharge Equipment Recommendations: walker, rolling, bedside commode, bath bench   Barriers to discharge: None    Assessment:     Crow Green is a 65 y.o. male admitted with a medical diagnosis of Gangrene of left foot.  He presents with the following impairments/functional limitations:  weakness, impaired endurance, impaired balance, gait instability, impaired functional mobilty.    Rehab Prognosis: Good; patient would benefit from acute skilled PT services to address these deficits and reach maximum level of function.    Recent Surgery: Procedure(s) (LRB):  CLOSURE, WOUND (Left) 1 Day Post-Op    Plan:     During this hospitalization, patient to be seen 5 x/week to address the identified rehab impairments via gait training, therapeutic activities, therapeutic exercises and progress toward the following goals:    · Plan of Care Expires:  07/09/20    Subjective     Chief Complaint: L FOOT PAIN  Patient/Family Comments/goals:   Pain/Comfort:  Pain Rating 1: 10/10  Location - Side 1: Left  Location 1: foot    Patients cultural, spiritual, Oriental orthodox conflicts given the current situation:      Living Environment:  PT LIVES WITH HIS FATHER WHO IS UNABLE TO ASSIST HIM AT HOME, 1 STORY HOUSE 4 STEPS TO ENTER NO RAIL, AMB WITH ROLLATOR WALKER COMMUNITY DISTANCES, DOES NOT DRIVE, STILL WORKS PER PT, SON COMES OVER TO ASSIST WITH ADL'S  Prior to admission, patients level of function was.  Equipment used at home: rollator.  DME owned (not currently used): none.  Upon discharge, patient will have assistance from ?.    Objective:     Communicated with NURSE LAND prior to session.  Patient found supine with peripheral IV, telemetry  upon PT entry to room.    General Precautions: Standard, fall   Orthopedic Precautions:(HEEL WB ON TO LLE WITH POST OP SHOE DONNED)    Braces: (POST OP SHOE)     Exams:  · Cognitive Exam:  Patient is oriented to Person, Place, Time and Situation  · Postural Exam:  Patient presented with the following abnormalities:    · -       Rounded shoulders  · Sensation:    · -       Impaired  light/touch BLE IN FEET  · RLE ROM: WFL  · RLE Strength: GROSSLY 4-/5  · LLE ROM: WFL  · LLE Strength: LIMITED BY PAIN    Functional Mobility:  · Bed Mobility:     · Rolling Left:  stand by assistance  · Scooting: stand by assistance  · Supine to Sit: stand by assistance  · Transfers:     · Sit to Stand:  contact guard assistance with rolling walker  · Bed to Chair: contact guard assistance with  rolling walker  using  Step Transfer  · Gait: PT AMB 10' IN ROOM WITH RW AND CGA, SLOW PACE, VERY PAIN LIMITED IN LLE, QUICK TO FATIGUE, GOOD COMPLIANCE WITH WB PRECAUTION  · Balance: FAIR    Therapeutic Activities and Exercises:   PT EDUCATED IN ROLE OF P.T. AND POC, PT EDUCATED IN RW USE AND SAFETY DURING TF'S AND GAIT, PT EDUCATED IN WB STATUS FOR LLE WITH POST OP SHOE DONNED, PT ENCOURAGED TO INCREASE TIME OOB IN CHAIR, PT LEFT SEATED WITH LLE ELEVATED    AM-PAC 6 CLICK MOBILITY  Total Score:18     Patient left up in chair with all lines intact, call button in reach, chair alarm on and NURSE notified.    GOALS:   Multidisciplinary Problems     Physical Therapy Goals        Problem: Physical Therapy Goal    Goal Priority Disciplines Outcome Goal Variances Interventions   Physical Therapy Goal     PT, PT/OT      Description: LTG'S TO BE MET IN 7 DAYS (7-9-20)  1. PT WILL BE FELIX WITH BED MOBILITY  2. PT WILL BE FELIX FOR TF'S  3. PT WILL ' WITH RW AND SPV                   History:     Past Medical History:   Diagnosis Date    Acute kidney injury     Arthritis     Asthma     Atrial fibrillation     Atrial fibrillation with RVR 8/7/2019    CHF (congestive heart failure)     Depression     Diabetes mellitus, type 2 Diagnosed in 2000    Elevated PSA      Hypertension     Sleep apnea        Past Surgical History:   Procedure Laterality Date    ABLATION OF ARRHYTHMOGENIC FOCUS FOR ATRIAL FIBRILLATION N/A 2/27/2020    Procedure: Ablation atrial fibrillation;  Surgeon: Gio Brown MD;  Location: [facility] EP LAB;  Service: Cardiology;  Laterality: N/A;  afib, DAMIEN (cx if SR), PVI, PITO, anes, MB, 3 Prep    CHOLECYSTECTOMY      CLOSURE OF WOUND Left 7/1/2020    Procedure: CLOSURE, WOUND;  Surgeon: Barb Veras DPM;  Location[facility] OR;  Service: Podiatry;  Laterality: Left;    SELECTIVE INJECTION OF ANESTHETIC AGENT AROUND LUMBAR SPINAL NERVE ROOT BY TRANSFORAMINAL APPROACH Left 6/11/2020    Procedure: BLOCK, SPINAL NERVE ROOT, LUMBAR, SELECTIVE, TRANSFORAMINAL APPROACH Left L4-5, L5-S1 TFESI with RN IV sedation;  Surgeon: Dillan Tsai MD;  Location: [facility] PAIN MGT;  Service: Pain Management;  Laterality: Left;    TOE AMPUTATION Left 6/29/2020    Procedure: AMPUTATION, TOE;  Surgeon: Barb Veras DPM;  Location: [facility];  Service: Podiatry;  Laterality: Left;  great toe       Time Tracking:     PT Received On: 07/02/20  PT Start Time: 1100     PT Stop Time: 1125  PT Total Time (min): 25 min     Billable Minutes: Evaluation 15 and Gait Training 10    Linda Pickett, SELVIN  07/02/2020

## 2020-07-02 NOTE — PLAN OF CARE
CM met with patient at the bedside to discuss the discharge plan.  Patient does not want any type of placement and said he wants to go home.  Preference was previously obtained for [facility].  CM also received a consult for community resources as he cares for his father in the home.  CM provided patient with information from the [place] on aging for  [place].     07/02/20 1037   Discharge Reassessment   Assessment Type Discharge Planning Reassessment   Provided patient/caregiver education on the expected discharge date and the discharge plan Yes   Do you have any problems affording any of your prescribed medications? No   Discharge Plan A Home Health;Home   DME Needed Upon Discharge  none   Patient choice form signed by patient/caregiver N/A   Anticipated Discharge Disposition Home-Health   Can the patient/caregiver answer the patient profile reliably? Yes, cognitively intact

## 2020-07-02 NOTE — PROGRESS NOTES
[facility] Hospital Medicine  Progress Note    Patient Name: Crow Green  MRN: [id number]  Patient Class: IP- Inpatient   Admission Date: 6/26/2020  Length of Stay: 6 days  Attending Physician: Anthony Hester, *  Primary Care Provider: Mateo Lambert DO        Subjective:     Principal Problem:Gangrene of left foot        HPI:  Mr. Green is a 66 yo male with a PMHx of DM II, HTN, HLD, SANDRITA, obesity, PAF on Eliquis, nonischemic CMPY with LVEF of 45%, nonobstructive CAD, and asthma.  He presented to the ED with c/o pain to left great toe that has progressively worsened over the past 1 week.  He was seen by Dr. Veras (Podiatry) last week and prescribed Augmentin and a foot boot.  Patient reports being compliant with antibiotics, but has seen no improvement in left great toe wound.  Associated toe swelling, redness, warmth, black color change, and chills.  The symptoms are aggravated by movement and activity; no alleviating factors.  Denies any drainage, foul odor, weakness, numbness/tingling, foot or ankle pain/swelling, CP, SOB, ABD pain, N/V/D, back pain, HA, lightheadedness, dizziness, syncope, fever or chills.  ED evaluation of left great toe was consistent with gangrene.  Labs showed WBC 18.95, 0% bands, and normal lactic.  Blood cultures were drawn in the ED and IV vanc given.  Hospital Medicine was contacted for admission.       Overview/Hospital Course:  65 y/p aam admitted with a Dx of Left foot gangrene  And  Left foot cellulitis . He was started on Broad spectrum IVAB . Podiatrist was consulted . He had a recent arterial and  Venous LE US which did not show any acute finding .The Blood cx are NGTD.  6/28 Pt was seen and examined at bedside . He is complaining of severe pain  Of the left toe . Podiatrist  Plan for surgical resection of great toe with possible graft application/WoundVac application with Dr. Barb Veras on Monday . The blood cx are NGTD   6/29 He is s/p  Left toe amputation today . The blood cx are NGTD . He is on cefepime and vanc . We will de escalate  Tomorrow am .  He report the pain is better . There was no acute event overnight .  6/30 He is s/p Left Hallux Amputation . The wound cx is (+) for Staph . He will be taken to the OR tomorrow  For closure .  The BP has been borderline low .  7/1 He is s/p wound closure today . He cont with left leg pain .  There was no acute event overnight   7/2 PT/OT recommend HH . Pt state the pain is improving . The DOAC was resume last night .     Interval History:     Review of Systems   Constitutional: Negative for chills, diaphoresis, fatigue and fever.   HENT: Negative for congestion, postnasal drip, rhinorrhea, sinus pressure and sore throat.    Respiratory: Negative for cough, shortness of breath and wheezing.    Cardiovascular: Negative for chest pain, palpitations and leg swelling.   Gastrointestinal: Negative for abdominal pain, diarrhea, nausea and vomiting.   Genitourinary: Negative for dysuria and hematuria.   Musculoskeletal: Positive for arthralgias. Negative for back pain and myalgias.   Skin: Positive for wound. Negative for color change, pallor and rash.   Neurological: Negative for dizziness, syncope, weakness, light-headedness, numbness and headaches.   All other systems reviewed and are negative.    Objective:     Vital Signs (Most Recent):  Temp: 99.2 °F (37.3 °C) (07/02/20 1150)  Pulse: 81 (07/02/20 1241)  Resp: 18 (07/02/20 1241)  BP: 127/70 (07/02/20 1150)  SpO2: 95 % (07/02/20 1241) Vital Signs (24h Range):  Temp:  [97.8 °F (36.6 °C)-99.2 °F (37.3 °C)] 99.2 °F (37.3 °C)  Pulse:  [80-88] 81  Resp:  [16-20] 18  SpO2:  [93 %-98 %] 95 %  BP: (100-127)/(50-70) 127/70     Weight: 93.9 kg (207 lb 0.2 oz)  Body mass index is 34.45 kg/m².    Intake/Output Summary (Last 24 hours) at 7/2/2020 1407  Last data filed at 7/1/2020 2100  Gross per 24 hour   Intake 478 ml   Output 1200 ml   Net -722 ml      Physical  Exam  Vitals signs and nursing note reviewed.   Constitutional:       General: He is not in acute distress.     Appearance: He is well-developed. He is not diaphoretic.   HENT:      Head: Normocephalic and atraumatic.   Eyes:      Conjunctiva/sclera: Conjunctivae normal.   Neck:      Musculoskeletal: Normal range of motion and neck supple.   Cardiovascular:      Rate and Rhythm: Normal rate and regular rhythm.      Pulses:           Dorsalis pedis pulses are 2+ on the right side and detected w/ Doppler on the left side.        Posterior tibial pulses are 2+ on the right side and 2+ on the left side.      Heart sounds: S1 normal and S2 normal. No murmur.   Pulmonary:      Effort: Pulmonary effort is normal. No tachypnea, accessory muscle usage or respiratory distress.      Breath sounds: No wheezing, rhonchi or rales.   Abdominal:      General: Bowel sounds are normal. There is no distension.      Palpations: Abdomen is soft.      Tenderness: There is no abdominal tenderness.   Musculoskeletal: Normal range of motion.   Feet:      Left foot:      Skin integrity: No ulcer, skin breakdown, erythema or warmth.      Comments: Left toe amputation . Covered .  Skin:     General: Skin is warm.      Capillary Refill: Capillary refill takes less than 2 seconds.      Findings: No rash.   Neurological:      Mental Status: He is alert and oriented to person, place, and time.   Psychiatric:         Behavior: Behavior normal.         Significant Labs: All pertinent labs within the past 24 hours have been reviewed.    Significant Imaging: I have reviewed all pertinent imaging results/findings within the past 24 hours.      Assessment/Plan:      * Gangrene of left foot  - Continue empiric IV vanc.  Pharmacy consult for dosing.  - Analgesics as needed.  - Podiatry consult.  S/P Left toe amputation     Chronic troponin elevation  - Troponin stable at baseline.  No CP or anginal equivalent.  EKG unrevealing.  - Patient with known  nonobstructive CAD per [facility] in 2016.  - Continue ASA, and statin.    Diabetic ulcer of toe of left foot associated with type 2 diabetes mellitus, with necrosis of muscle  - Cont IV vanc   -Podiatrist consulted . S/P Left toe amputation   -Wound Cx staph aureus     Stage 3 chronic kidney disease  - Creatinine stable at baseline.  Avoid nephrotoxic agents.  Follow daily labs.    PAF (paroxysmal atrial fibrillation)  - Monitor telemetry.  - Continue home Toprol XL and diltiazem.   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  - Cont levemir 20 unit qhs   - AccuChecks with moderate dose SSI.  - Diabetic diet.  - Recent HbA1c of 7.7.    Chronic combined systolic and diastolic HFrEF of 45%  - Clinically compensated   - Strict I&O's, daily weights, Na/fluid restriction.     Hypertension associated with diabetes  -cont current BP medication   -D/C lasix         VTE Risk Mitigation (From admission, onward)         Ordered     apixaban tablet 5 mg  2 times daily      07/01/20 1446     IP VTE HIGH RISK PATIENT  Once      06/26/20 2324     Reason for No Pharmacological VTE Prophylaxis  Once     Question:  Reasons:  Answer:  Already adequately anticoagulated on oral Anticoagulants    06/26/20 2217     Place sequential compression device  Until discontinued      06/26/20 2211                      Anthony Hester MD  Department of Hospital Medicine   [facility]

## 2020-07-02 NOTE — SUBJECTIVE & OBJECTIVE
Interval History:     Review of Systems   Constitutional: Negative for chills, diaphoresis, fatigue and fever.   HENT: Negative for congestion, postnasal drip, rhinorrhea, sinus pressure and sore throat.    Respiratory: Negative for cough, shortness of breath and wheezing.    Cardiovascular: Negative for chest pain, palpitations and leg swelling.   Gastrointestinal: Negative for abdominal pain, diarrhea, nausea and vomiting.   Genitourinary: Negative for dysuria and hematuria.   Musculoskeletal: Positive for arthralgias. Negative for back pain and myalgias.   Skin: Positive for wound. Negative for color change, pallor and rash.   Neurological: Negative for dizziness, syncope, weakness, light-headedness, numbness and headaches.   All other systems reviewed and are negative.    Objective:     Vital Signs (Most Recent):  Temp: 99.2 °F (37.3 °C) (07/02/20 1150)  Pulse: 81 (07/02/20 1241)  Resp: 18 (07/02/20 1241)  BP: 127/70 (07/02/20 1150)  SpO2: 95 % (07/02/20 1241) Vital Signs (24h Range):  Temp:  [97.8 °F (36.6 °C)-99.2 °F (37.3 °C)] 99.2 °F (37.3 °C)  Pulse:  [80-88] 81  Resp:  [16-20] 18  SpO2:  [93 %-98 %] 95 %  BP: (100-127)/(50-70) 127/70     Weight: 93.9 kg (207 lb 0.2 oz)  Body mass index is 34.45 kg/m².    Intake/Output Summary (Last 24 hours) at 7/2/2020 1405  Last data filed at 7/1/2020 2100  Gross per 24 hour   Intake 478 ml   Output 1200 ml   Net -722 ml      Physical Exam  Vitals signs and nursing note reviewed.   Constitutional:       General: He is not in acute distress.     Appearance: He is well-developed. He is not diaphoretic.   HENT:      Head: Normocephalic and atraumatic.   Eyes:      Conjunctiva/sclera: Conjunctivae normal.   Neck:      Musculoskeletal: Normal range of motion and neck supple.   Cardiovascular:      Rate and Rhythm: Normal rate and regular rhythm.      Pulses:           Dorsalis pedis pulses are 2+ on the right side and detected w/ Doppler on the left side.        Posterior  tibial pulses are 2+ on the right side and 2+ on the left side.      Heart sounds: S1 normal and S2 normal. No murmur.   Pulmonary:      Effort: Pulmonary effort is normal. No tachypnea, accessory muscle usage or respiratory distress.      Breath sounds: No wheezing, rhonchi or rales.   Abdominal:      General: Bowel sounds are normal. There is no distension.      Palpations: Abdomen is soft.      Tenderness: There is no abdominal tenderness.   Musculoskeletal: Normal range of motion.   Feet:      Left foot:      Skin integrity: No ulcer, skin breakdown, erythema or warmth.      Comments: Left toe amputation . Covered .  Skin:     General: Skin is warm.      Capillary Refill: Capillary refill takes less than 2 seconds.      Findings: No rash.   Neurological:      Mental Status: He is alert and oriented to person, place, and time.   Psychiatric:         Behavior: Behavior normal.         Significant Labs: All pertinent labs within the past 24 hours have been reviewed.    Significant Imaging: I have reviewed all pertinent imaging results/findings within the past 24 hours.

## 2020-07-02 NOTE — SUBJECTIVE & OBJECTIVE
Interval History:     Review of Systems   Constitutional: Negative for chills, diaphoresis, fatigue and fever.   HENT: Negative for congestion, postnasal drip, rhinorrhea, sinus pressure and sore throat.    Respiratory: Negative for cough, shortness of breath and wheezing.    Cardiovascular: Negative for chest pain, palpitations and leg swelling.   Gastrointestinal: Negative for abdominal pain, diarrhea, nausea and vomiting.   Genitourinary: Negative for dysuria and hematuria.   Musculoskeletal: Positive for arthralgias. Negative for back pain and myalgias.   Skin: Positive for wound. Negative for color change, pallor and rash.   Neurological: Negative for dizziness, syncope, weakness, light-headedness, numbness and headaches.   All other systems reviewed and are negative.    Objective:     Vital Signs (Most Recent):  Temp: 99.2 °F (37.3 °C) (07/02/20 1150)  Pulse: 81 (07/02/20 1241)  Resp: 18 (07/02/20 1241)  BP: 127/70 (07/02/20 1150)  SpO2: 95 % (07/02/20 1241) Vital Signs (24h Range):  Temp:  [97.8 °F (36.6 °C)-99.2 °F (37.3 °C)] 99.2 °F (37.3 °C)  Pulse:  [80-88] 81  Resp:  [16-20] 18  SpO2:  [93 %-98 %] 95 %  BP: (100-127)/(50-70) 127/70     Weight: 93.9 kg (207 lb 0.2 oz)  Body mass index is 34.45 kg/m².    Intake/Output Summary (Last 24 hours) at 7/2/2020 1407  Last data filed at 7/1/2020 2100  Gross per 24 hour   Intake 478 ml   Output 1200 ml   Net -722 ml      Physical Exam  Vitals signs and nursing note reviewed.   Constitutional:       General: He is not in acute distress.     Appearance: He is well-developed. He is not diaphoretic.   HENT:      Head: Normocephalic and atraumatic.   Eyes:      Conjunctiva/sclera: Conjunctivae normal.   Neck:      Musculoskeletal: Normal range of motion and neck supple.   Cardiovascular:      Rate and Rhythm: Normal rate and regular rhythm.      Pulses:           Dorsalis pedis pulses are 2+ on the right side and detected w/ Doppler on the left side.        Posterior  tibial pulses are 2+ on the right side and 2+ on the left side.      Heart sounds: S1 normal and S2 normal. No murmur.   Pulmonary:      Effort: Pulmonary effort is normal. No tachypnea, accessory muscle usage or respiratory distress.      Breath sounds: No wheezing, rhonchi or rales.   Abdominal:      General: Bowel sounds are normal. There is no distension.      Palpations: Abdomen is soft.      Tenderness: There is no abdominal tenderness.   Musculoskeletal: Normal range of motion.   Feet:      Left foot:      Skin integrity: No ulcer, skin breakdown, erythema or warmth.      Comments: Left toe amputation . Covered .  Skin:     General: Skin is warm.      Capillary Refill: Capillary refill takes less than 2 seconds.      Findings: No rash.   Neurological:      Mental Status: He is alert and oriented to person, place, and time.   Psychiatric:         Behavior: Behavior normal.         Significant Labs: All pertinent labs within the past 24 hours have been reviewed.    Significant Imaging: I have reviewed all pertinent imaging results/findings within the past 24 hours.

## 2020-07-02 NOTE — PLAN OF CARE
P.T. TANISHA COMPLETE, PT CURRENTLY REQUIRES [facility] FOR BED MOBILITY, CGA FOR TF'S AND SHORT DISTANCE GAIT USING RW, PT IS HEEL WB ONLY FOR LLE WITH POST OP SHOE DONNED

## 2020-07-02 NOTE — ASSESSMENT & PLAN NOTE
- Troponin stable at baseline.  No CP or anginal equivalent.  EKG unrevealing.  - Patient with known nonobstructive CAD per [facility] in 2016.  - Continue ASA, and statin.

## 2020-07-02 NOTE — PLAN OF CARE
ISAK confirmed appoints with Dr Veras and Dr Lemus.  ISAK scheduled transportation provided by [facility] through [employer] @[phone].    The following instructions have been placed in patient instructions on the AVS.  The instructions were also provided to the patient on a word document.      [facility] transportation will pick you up for the following appointments:    Dr Elías Lemus   Appointment Date/Time: 7-7-2020 at 8:00 am   time at your home: 7:15 am  Call [facility] at [phone] when your appointment is complete to arrange   Reservation number: [phone]    Dr Barb Veras  Appointment Date/Time: 7-8-2020 at 10:00   time from your home: 9:00 am   time from the clinic: 11:00 am  Reservation number: [id number]      ISAK also provided patient with the brochure from [employer] for any other appointments that he will need to arrange transportation for.

## 2020-07-02 NOTE — CONSULTS
ISAK contacted [employer] @ [phone] to arrange for transportation to the appointment with Dr Veras on 7-6.  [facility] stated that [facility] requires 3 business days in order to arrange transportation.  The appointment on 7-6 does not meet these guidelines.    ISAK also contacted Dr Lemus's office to confirm the appointment on 7-7 and he does not have an appointment on that date.  They do not see an appointment scheduled with Dr Lemus.      ISAK updated Dr Veras via secure chat.

## 2020-07-03 VITALS
DIASTOLIC BLOOD PRESSURE: 60 MMHG | RESPIRATION RATE: 18 BRPM | WEIGHT: 204.13 LBS | HEIGHT: 65 IN | TEMPERATURE: 98 F | SYSTOLIC BLOOD PRESSURE: 126 MMHG | OXYGEN SATURATION: 92 % | HEART RATE: 79 BPM | BODY MASS INDEX: 34.01 KG/M2

## 2020-07-03 PROBLEM — I96 GANGRENE OF LEFT FOOT: Status: RESOLVED | Noted: 2020-06-26 | Resolved: 2020-07-03

## 2020-07-03 LAB
ANION GAP SERPL CALC-SCNC: 10 MMOL/L (ref 8–16)
BASOPHILS # BLD AUTO: 0.02 K/UL (ref 0–0.2)
BASOPHILS NFR BLD: 0.2 % (ref 0–1.9)
BUN SERPL-MCNC: 14 MG/DL (ref 8–23)
CALCIUM SERPL-MCNC: 8.9 MG/DL (ref 8.7–10.5)
CHLORIDE SERPL-SCNC: 98 MMOL/L (ref 95–110)
CO2 SERPL-SCNC: 26 MMOL/L (ref 23–29)
CREAT SERPL-MCNC: 0.9 MG/DL (ref 0.5–1.4)
DIFFERENTIAL METHOD: ABNORMAL
EOSINOPHIL # BLD AUTO: 0.3 K/UL (ref 0–0.5)
EOSINOPHIL NFR BLD: 2.7 % (ref 0–8)
ERYTHROCYTE [DISTWIDTH] IN BLOOD BY AUTOMATED COUNT: 14.2 % (ref 11.5–14.5)
EST. GFR  (AFRICAN AMERICAN): >60 ML/MIN/1.73 M^2
EST. GFR  (NON AFRICAN AMERICAN): >60 ML/MIN/1.73 M^2
GLUCOSE SERPL-MCNC: 162 MG/DL (ref 70–110)
HCT VFR BLD AUTO: 29.3 % (ref 40–54)
HGB BLD-MCNC: 8.8 G/DL (ref 14–18)
IMM GRANULOCYTES # BLD AUTO: 0.08 K/UL (ref 0–0.04)
IMM GRANULOCYTES NFR BLD AUTO: 0.7 % (ref 0–0.5)
LYMPHOCYTES # BLD AUTO: 1.6 K/UL (ref 1–4.8)
LYMPHOCYTES NFR BLD: 14.4 % (ref 18–48)
MCH RBC QN AUTO: 26.4 PG (ref 27–31)
MCHC RBC AUTO-ENTMCNC: 30 G/DL (ref 32–36)
MCV RBC AUTO: 88 FL (ref 82–98)
MONOCYTES # BLD AUTO: 0.9 K/UL (ref 0.3–1)
MONOCYTES NFR BLD: 8.6 % (ref 4–15)
NEUTROPHILS # BLD AUTO: 8.1 K/UL (ref 1.8–7.7)
NEUTROPHILS NFR BLD: 73.4 % (ref 38–73)
NRBC BLD-RTO: 0 /100 WBC
PLATELET # BLD AUTO: 237 K/UL (ref 150–350)
PMV BLD AUTO: 11 FL (ref 9.2–12.9)
POCT GLUCOSE: 160 MG/DL (ref 70–110)
POCT GLUCOSE: 169 MG/DL (ref 70–110)
POTASSIUM SERPL-SCNC: 4.2 MMOL/L (ref 3.5–5.1)
RBC # BLD AUTO: 3.33 M/UL (ref 4.6–6.2)
SODIUM SERPL-SCNC: 134 MMOL/L (ref 136–145)
WBC # BLD AUTO: 10.99 K/UL (ref 3.9–12.7)

## 2020-07-03 PROCEDURE — 97116 GAIT TRAINING THERAPY: CPT | Mod: HCNC

## 2020-07-03 PROCEDURE — 36415 COLL VENOUS BLD VENIPUNCTURE: CPT | Mod: HCNC

## 2020-07-03 PROCEDURE — 94640 AIRWAY INHALATION TREATMENT: CPT | Mod: HCNC

## 2020-07-03 PROCEDURE — 97535 SELF CARE MNGMENT TRAINING: CPT | Mod: HCNC | Performed by: PHYSICAL THERAPIST

## 2020-07-03 PROCEDURE — 97530 THERAPEUTIC ACTIVITIES: CPT | Mod: HCNC

## 2020-07-03 PROCEDURE — 25000003 PHARM REV CODE 250: Mod: HCNC | Performed by: INTERNAL MEDICINE

## 2020-07-03 PROCEDURE — 25000242 PHARM REV CODE 250 ALT 637 W/ HCPCS: Mod: HCNC | Performed by: INTERNAL MEDICINE

## 2020-07-03 PROCEDURE — 63600175 PHARM REV CODE 636 W HCPCS: Mod: HCNC | Performed by: INTERNAL MEDICINE

## 2020-07-03 PROCEDURE — 25000242 PHARM REV CODE 250 ALT 637 W/ HCPCS: Mod: HCNC | Performed by: PODIATRIST

## 2020-07-03 PROCEDURE — 25000003 PHARM REV CODE 250: Mod: HCNC | Performed by: PODIATRIST

## 2020-07-03 PROCEDURE — 94761 N-INVAS EAR/PLS OXIMETRY MLT: CPT | Mod: HCNC

## 2020-07-03 PROCEDURE — 97530 THERAPEUTIC ACTIVITIES: CPT | Mod: HCNC | Performed by: PHYSICAL THERAPIST

## 2020-07-03 PROCEDURE — 85025 COMPLETE CBC W/AUTO DIFF WBC: CPT | Mod: HCNC

## 2020-07-03 PROCEDURE — 80048 BASIC METABOLIC PNL TOTAL CA: CPT | Mod: HCNC

## 2020-07-03 RX ORDER — INSULIN ASPART 100 [IU]/ML
INJECTION, SOLUTION INTRAVENOUS; SUBCUTANEOUS
Qty: 15 ML | Refills: 0
Start: 2020-07-03 | End: 2020-08-21 | Stop reason: SDUPTHER

## 2020-07-03 RX ORDER — DOXYCYCLINE HYCLATE 100 MG
100 TABLET ORAL EVERY 12 HOURS
Qty: 14 TABLET | Refills: 0 | Status: SHIPPED | OUTPATIENT
Start: 2020-07-03 | End: 2020-07-03

## 2020-07-03 RX ORDER — INSULIN GLARGINE 100 [IU]/ML
INJECTION, SOLUTION SUBCUTANEOUS
Qty: 75 ML | Refills: 4
Start: 2020-07-03 | End: 2021-02-22 | Stop reason: SDUPTHER

## 2020-07-03 RX ORDER — HYDROCODONE BITARTRATE AND ACETAMINOPHEN 10; 325 MG/1; MG/1
1 TABLET ORAL EVERY 6 HOURS PRN
Qty: 14 TABLET | Refills: 0 | Status: SHIPPED | OUTPATIENT
Start: 2020-07-03 | End: 2020-07-03

## 2020-07-03 RX ORDER — HYDROCODONE BITARTRATE AND ACETAMINOPHEN 10; 325 MG/1; MG/1
1 TABLET ORAL EVERY 6 HOURS PRN
Qty: 14 TABLET | Refills: 0 | Status: SHIPPED | OUTPATIENT
Start: 2020-07-03 | End: 2020-07-13

## 2020-07-03 RX ORDER — MORPHINE SULFATE 2 MG/ML
2 INJECTION, SOLUTION INTRAMUSCULAR; INTRAVENOUS ONCE
Status: COMPLETED | OUTPATIENT
Start: 2020-07-03 | End: 2020-07-03

## 2020-07-03 RX ORDER — DOXYCYCLINE HYCLATE 100 MG
100 TABLET ORAL EVERY 12 HOURS
Qty: 14 TABLET | Refills: 0 | Status: ON HOLD | OUTPATIENT
Start: 2020-07-03 | End: 2020-07-08 | Stop reason: HOSPADM

## 2020-07-03 RX ADMIN — IPRATROPIUM BROMIDE AND ALBUTEROL SULFATE 3 ML: .5; 3 SOLUTION RESPIRATORY (INHALATION) at 07:07

## 2020-07-03 RX ADMIN — HYDROCODONE BITARTRATE AND ACETAMINOPHEN 1 TABLET: 10; 325 TABLET ORAL at 04:07

## 2020-07-03 RX ADMIN — INSULIN ASPART 2 UNITS: 100 INJECTION, SOLUTION INTRAVENOUS; SUBCUTANEOUS at 05:07

## 2020-07-03 RX ADMIN — ASPIRIN 81 MG: 81 TABLET, COATED ORAL at 08:07

## 2020-07-03 RX ADMIN — DOXYCYCLINE HYCLATE 100 MG: 100 TABLET, COATED ORAL at 08:07

## 2020-07-03 RX ADMIN — BUDESONIDE 0.5 MG: 0.5 SUSPENSION RESPIRATORY (INHALATION) at 07:07

## 2020-07-03 RX ADMIN — APIXABAN 5 MG: 2.5 TABLET, FILM COATED ORAL at 08:07

## 2020-07-03 RX ADMIN — DILTIAZEM HYDROCHLORIDE 60 MG: 60 TABLET, FILM COATED ORAL at 08:07

## 2020-07-03 RX ADMIN — GABAPENTIN 800 MG: 400 CAPSULE ORAL at 08:07

## 2020-07-03 RX ADMIN — TAMSULOSIN HYDROCHLORIDE 0.4 MG: 0.4 CAPSULE ORAL at 08:07

## 2020-07-03 RX ADMIN — ARFORMOTEROL TARTRATE 15 MCG: 15 SOLUTION RESPIRATORY (INHALATION) at 07:07

## 2020-07-03 RX ADMIN — MORPHINE SULFATE 2 MG: 2 INJECTION, SOLUTION INTRAMUSCULAR; INTRAVENOUS at 09:07

## 2020-07-03 RX ADMIN — METOPROLOL SUCCINATE 50 MG: 50 TABLET, EXTENDED RELEASE ORAL at 08:07

## 2020-07-03 RX ADMIN — PANTOPRAZOLE SODIUM 40 MG: 40 TABLET, DELAYED RELEASE ORAL at 08:07

## 2020-07-03 NOTE — PLAN OF CARE
ISAK contacted [facility] and spoke to Katey (answering service) and provided information of discharge today.  Katey will notify the on call nurse.

## 2020-07-03 NOTE — PLAN OF CARE
Jul7 Follow up with Elías Lemus MD  Tuesday Jul 7, 2020  at 8:00 am [address]   [address] [facility]   [place] [address]  [phone]   Jul8 Established Patient Visit with Barb Veras DPM  Wednesday Jul 8, 2020 10:00 AM  Arrive at check-in approximately 15 minutes before your scheduled appointment time. Bring all outside medical records and imaging, along with a list of your current medications and insurance card.  Prepay due: Estimate unavailable  1st Floor - From [address], take the [facility] exit ([address]). Enter the facility from the [address] The [facility]  [address] [facility]   [place] [id number] [phone]     CM made arrangements with [employer] for transportation to both appointments listed above.  Patient provided with information and information placed on AVS.       07/03/20 0901   Final Note   Assessment Type Final Discharge Note   Anticipated Discharge Disposition Home-Health   Hospital Follow Up  Appt(s) scheduled? Yes   Right Care Referral Info   Post Acute Recommendation Home-care   Facility Name [facility]

## 2020-07-03 NOTE — DISCHARGE INSTRUCTIONS
[facility] transportation will pick you up for the following appointments:    Dr Elías Lemus   Appointment Date/Time: 7-7-2020 at 8:00 am   time at your home: 7:15 am  Call [facility] at [phone] when your appointment is complete to arrange   Reservation number: [id number]    Dr Barb Veras  Appointment Date/Time: 7-8-2020 at 10:00   time from your home: 9:00 am   time from the clinic: 11:00 am  Reservation number: [id number]

## 2020-07-03 NOTE — PLAN OF CARE
Pt in bed AAOx4. Plan of Care reviewed, Verbalized understanding.   Patient remained free from falls, fall precautions in place.  Patient is NSR with a right bundle branch block on monitor.VSS.  Call bell and personal belongings within reach. Hourly rounding complete. Reminded to call for assistance. No complaints at this time. Will continue to monitor.

## 2020-07-03 NOTE — DISCHARGE SUMMARY
[facility] Hospital Medicine  Discharge Summary      Patient Name: Crow Green  MRN: [id number]  Admission Date: 6/26/2020  Hospital Length of Stay: 7 days  Discharge Date and Time: 7/3/2020  1:28 PM  Attending Physician: No att. providers found   Discharging Provider: Anthony Hester MD  Primary Care Provider: Mateo Lambert DO      HPI:   Mr. Green is a 64 yo male with a PMHx of DM II, HTN, HLD, SANDRITA, obesity, PAF on Eliquis, nonischemic CMPY with LVEF of 45%, nonobstructive CAD, and asthma.  He presented to the ED with c/o pain to left great toe that has progressively worsened over the past 1 week.  He was seen by Dr. Veras (Podiatry) last week and prescribed Augmentin and a foot boot.  Patient reports being compliant with antibiotics, but has seen no improvement in left great toe wound.  Associated toe swelling, redness, warmth, black color change, and chills.  The symptoms are aggravated by movement and activity; no alleviating factors.  Denies any drainage, foul odor, weakness, numbness/tingling, foot or ankle pain/swelling, CP, SOB, ABD pain, N/V/D, back pain, HA, lightheadedness, dizziness, syncope, fever or chills.  ED evaluation of left great toe was consistent with gangrene.  Labs showed WBC 18.95, 0% bands, and normal lactic.  Blood cultures were drawn in the ED and IV vanc given.  Hospital Medicine was contacted for admission.       Procedure(s) (LRB):  CLOSURE, WOUND (Left)      Hospital Course:   65 y/p aam admitted with a Dx of Left foot gangrene  And  Left foot cellulitis . He was started on Broad spectrum IVAB . Podiatrist was consulted . He had a recent arterial and  Venous LE US which did not show any acute finding .The Blood cx are NGTD.  6/28 Pt was seen and examined at bedside . He is complaining of severe pain  Of the left toe . Podiatrist  Plan for surgical resection of great toe with possible graft application/WoundVac application with Dr. Day  Rito on Monday . The blood cx are NGTD   6/29 He is s/p Left toe amputation today . The blood cx are NGTD . He is on cefepime and vanc . We will de escalate  Tomorrow am .  He report the pain is better . There was no acute event overnight .  6/30 He is s/p Left Hallux Amputation . The wound cx is (+) for Staph . He will be taken to the OR tomorrow  For closure .  The BP has been borderline low .  7/1 He is s/p wound closure today . He cont with left leg pain .  There was no acute event overnight   7/2 PT/OT recommend HH . Pt state the pain is improving . The DOAC was resume last night .   7/3 Pt was seen and examined at bedside . He was determined  To be suitable for d/c   -Pt insulin  Dosage ere decrease . He was advised to f/u his pcp  -He will F/U  Vascular surgery and Podiatrist  -He will d/c on doxycycline   -PT/OT rec  HH     Consults:   Consults (From admission, onward)        Status Ordering Provider     Inpatient consult to Podiatry  Once     Provider:  Checo Turcios DPM    Completed CORNELL ROMEO     Inpatient consult to Social Work  Once     Provider:  (Not yet assigned)    Completed MARTIN ANDERSON     Inpatient consult to Social Work  Once     Provider:  (Not yet assigned)    Completed MARTIN ANDERSON     Inpatient consult to Vascular Surgery  Once     Provider:  Charan Magaña MD    Completed MARTIN ANDERSON          Chronic troponin elevation  - Troponin stable at baseline.  No CP or anginal equivalent.  EKG unrevealing.  - Patient with known nonobstructive CAD per [facility] in 2016.  - Continue ASA, and statin.    Diabetic ulcer of toe of left foot associated with type 2 diabetes mellitus, with necrosis of muscle  - Cont IV vanc   -Podiatrist consulted . S/P Left toe amputation   -Wound Cx staph aureus     Stage 3 chronic kidney disease  - Creatinine stable at baseline.  Avoid nephrotoxic agents.  Follow daily labs.    PAF (paroxysmal atrial fibrillation)  - Monitor telemetry.  - Continue home  Toprol XL and diltiazem.   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  - Cont levemir 20 unit qhs   - AccuChecks with moderate dose SSI.  - Diabetic diet.  - Recent HbA1c of 7.7.    Chronic combined systolic and diastolic HFrEF of 45%  - Clinically compensated   - Strict I&O's, daily weights, Na/fluid restriction.     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, with long-term current use of insulin [E11.3299, Z79.4, E11.65] 12/07/2016 Not Applicable     Chronic    Chronic combined systolic and diastolic HFrEF of 45% [I50.42] 12/05/2016 Yes     Chronic    Hypertension associated with diabetes [E11.59, I10] 02/27/2015 Yes     Chronic      Problems Resolved During this Admission:    Diagnosis Date Noted Date Resolved POA    PRINCIPAL PROBLEM:  Gangrene of left foot [I96] 06/26/2020 07/03/2020 Yes       Discharged Condition: stable    Disposition: Home-Health Care [facility]    Follow Up:  Follow-up Information     Elías Lemus MD On 7/7/2020.    Specialty: Vascular Surgery  Why: at 8:00 am  Contact information:  [address]  [address] [place] [address] [place] [address]  [phone]             [facility].    Specialty: Home Health Services  Why: Home Health  Contact  information:  [address]  [place] [address]  [phone]             Mateo Lambert DO In 1 week.    Specialty: Family Medicine  Contact information:  [address] [facility] [address]  [place] [address]  [phone]             Barb Veras DPM In 1 week.    Specialty: Podiatry  Contact information:  [address] [facility]   [place] [address]  [phone]             Elías Lemus MD In 1 week.    Specialty: Vascular Surgery  Contact information:  [address] [facility]  [address] F[address]  [place] [address]  [phone]                 Patient Instructions:      Ambulatory referral/consult to Outpatient Case Management   Referral Priority: Routine Referral Type: Consultation   Referral Reason: Specialty Services Required   Number of Visits Requested: 1     Ambulatory referral/consult to Home Health   Standing Status: Future   Referral Priority: Routine Referral Type: Home Health   Referral Reason: Specialty Services Required   Requested Specialty: Home Health Services   Number of Visits Requested: 1     Diet diabetic     Notify your health care provider if you experience any of the following:  temperature >100.4     Notify your health care provider if you experience any of the following:  persistent nausea and vomiting or diarrhea     Notify your health care provider if you experience any of the following:  severe uncontrolled pain     Notify your health care provider if you experience any of the following:  redness, tenderness, or signs of infection (pain, swelling, redness, odor or green/yellow discharge around incision site)     Notify your health care provider if you experience any of the following:  difficulty breathing or increased cough     Notify your health care provider if you experience any of the following:  severe persistent headache     Notify your health care provider if you experience any of the following:  worsening rash     Notify your health care provider if you experience any of the  following:  persistent dizziness, light-headedness, or visual disturbances     Notify your health care provider if you experience any of the following:  increased confusion or weakness     Notify your health care provider if you experience any of the following:     Change dressing (specify)   Order Comments: Dressing to remain dry, clean, and intact until his office appointment.(Dr Veras )  Patient can heel weightbear on left foot with surgical shoe.     Activity as tolerated       Significant Diagnostic Studies: Labs:   BMP:   Recent Labs   Lab 07/02/20 0428 07/03/20  0523   * 162*    134*   K 4.5 4.2   CL 99 98   CO2 31* 26   BUN 17 14   CREATININE 1.1 0.9   CALCIUM 8.6* 8.9   , CMP   Recent Labs   Lab 07/02/20  0428 07/03/20  0523    134*   K 4.5 4.2   CL 99 98   CO2 31* 26   * 162*   BUN 17 14   CREATININE 1.1 0.9   CALCIUM 8.6* 8.9   ANIONGAP 6* 10   ESTGFRAFRICA >60 >60   EGFRNONAA >60 >60    and CBC   Recent Labs   Lab 07/02/20 0428 07/03/20  0523   WBC 10.37 10.99   HGB 8.7* 8.8*   HCT 28.3* 29.3*    237     Microbiology:   Blood Culture   Lab Results   Component Value Date    LABBLOO No growth after 5 days. 06/26/2020       Pending Diagnostic Studies:     None         Medications:  Reconciled Home Medications:      Medication List      START taking these medications    doxycycline 100 MG tablet  Commonly known as: VIBRA-TABS  Take 1 tablet (100 mg total) by mouth every 12 (twelve) hours. for 7 days     HYDROcodone-acetaminophen  mg per tablet  Commonly known as: NORCO  Take 1 tablet by mouth every 6 (six) hours as needed.  Replaces: HYDROcodone-acetaminophen 5-325 mg per tablet        CHANGE how you take these medications    insulin aspart U-100 100 unit/mL (3 mL) Inpn pen  Commonly known as: NovoLOG Flexpen U-100 Insulin  INJECT 10 UNITS SUBCUTANEOUSLY THREE TIMES DAILY WITH MEALS  What changed: additional instructions     LANTUS SOLOSTAR U-100 INSULIN glargine 100  units/mL (3mL) SubQ pen  Generic drug: insulin  INJECT 35 UNITS SUBCUTANEOUSLY EVERY EVENING  What changed: additional instructions        CONTINUE taking these medications    ALCOHOL PREP PADS TOP     allopurinoL 100 MG tablet  Commonly known as: ZYLOPRIM  Take 1 tablet (100 mg total) by mouth once daily.     aspirin 81 MG EC tablet  Commonly known as: ECOTRIN  Take 1 tablet (81 mg total) by mouth once daily.     atorvastatin 40 MG tablet  Commonly known as: LIPITOR  TAKE 1 TABLET EVERY EVENING     azaTHIOprine 50 mg Tab  Commonly known as: IMURAN  Take 1 tablet (50 mg total) by mouth once daily.     baclofen 10 MG tablet  Commonly known as: LIORESAL  Take 1 tablet (10 mg total) by mouth once daily.     blood sugar diagnostic Strp  Commonly known as: TRUE METRIX GLUCOSE TEST STRIP  Use with blood glucose meter kit to test blood sugar four times  daily     blood-glucose meter kit  Commonly known as: TRUE METRIX AIR GLUCOSE METER  TEST FOUR TIMES DAILY BEFORE MEALS  AND EVERY NIGHT     colchicine 0.6 mg tablet  Commonly known as: COLCRYS  Take 1 tablet (0.6 mg total) by mouth once daily.     diclofenac sodium 1 % Gel  Commonly known as: VOLTAREN  Apply 2 g topically 4 (four) times daily.     diltiaZEM 60 MG Cp12  Commonly known as: CARDIZEM SR  Take 1 capsule (60 mg total) by mouth 2 (two) times daily.     ELIQUIS 5 mg Tab  Generic drug: apixaban  Take 1 tablet (5 mg total) by mouth 2 (two) times daily.     fluticasone-salmeterol 250-50 mcg/dose 250-50 mcg/dose diskus inhaler  Commonly known as: ADVAIR DISKUS  Inhale 1 puff into the lungs 2 (two) times daily. Controller     food supplemt, lactose-reduced Liqd  Commonly known as: ENSURE  Take 118 mLs by mouth 3 (three) times daily with meals.     furosemide 40 MG tablet  Commonly known as: LASIX  TAKE 2 TABLETS EVERY MORNING  AND TAKE 1 TABLET EVERY EVENING     gabapentin 800 MG tablet  Commonly known as: NEURONTIN  Take 1 tablet (800 mg total) by mouth 3 (three)  "times daily.     glimepiride 2 MG tablet  Commonly known as: AMARYL  TAKE 1 TABLET (2 MG TOTAL) BY MOUTH BEFORE BREAKFAST.     inhalation spacing device  Use as directed for inhalation.     insulin syringe-needle U-100 1 mL 31 gauge x 5/16 Syrg     ipratropium 0.02 % nebulizer solution  Commonly known as: ATROVENT  USE 1 VIAL VIA NEBULIZER FOUR TIMES DAILY     isosorbide mononitrate 60 MG 24 hr tablet  Commonly known as: IMDUR  Take 1 tablet (60 mg total) by mouth once daily.     ketorolac 10 mg tablet  Commonly known as: TORADOL  Take 1 tablet (10 mg total) by mouth every 8 (eight) hours as needed for Pain.     lancets 32 gauge Misc  1 lancet by Misc.(Non-Drug; Combo Route) route 2 (two) times daily.     latanoprost 0.005 % ophthalmic solution  INSTILL 1 DROP INTO BOTH EYES EVERY EVENING     lisinopriL 10 MG tablet  Take 1 tablet (10 mg total) by mouth once daily.     metFORMIN 500 MG XR 24hr tablet  Commonly known as: GLUCOPHAGE-XR  Take 2 tablets (1,000 mg total) by mouth 2 (two) times daily with meals.     methylPREDNISolone 4 mg tablet  Commonly known as: MEDROL DOSEPACK  Take as directed on the package.     metoprolol succinate 50 MG 24 hr tablet  Commonly known as: TOPROL-XL  Take 1 tablet (50 mg total) by mouth once daily.     nitroGLYCERIN 0.3 MG SL tablet  Commonly known as: NITROSTAT  PLACE 1 TABLET UNDER THE TONGUE EVERY 5 MINUTES AS NEEDED FOR CHEST PAIN, NO MORE THAN 3 TABLETS IN ONE DAY     OZEMPIC 1 mg/dose (2 mg/1.5 mL) Pnij  Generic drug: semaglutide     pantoprazole 40 MG tablet  Commonly known as: PROTONIX  Take 1 tablet (40 mg total) by mouth once daily.     pen needle, diabetic 31 gauge x 5/16" Ndle  Commonly known as: BD ULTRA-FINE SHORT PEN NEEDLE  Inject 1 each into the skin 3 (three) times daily.     predniSONE 5 MG tablet  Commonly known as: DELTASONE  Take 1 tablet (5 mg total) by mouth 2 (two) times daily.     * sertraline 50 MG tablet  Commonly known as: ZOLOFT  TAKE 1 TABLET ONE TIME " DAILY     * sertraline 100 MG tablet  Commonly known as: ZOLOFT  Take 1 tablet (100 mg total) by mouth every evening.     silver sulfADIAZINE 1% 1 % cream  Commonly known as: SILVADENE  Apply topically 2 (two) times daily.     tamsulosin 0.4 mg Cap  Commonly known as: FLOMAX  Take 1 capsule (0.4 mg total) by mouth once daily.     tiotropium 18 mcg inhalation capsule  Commonly known as: SPIRIVA WITH HANDIHALER  Inhale 1 capsule (18 mcg total) into the lungs once daily. Controller     traZODone 100 MG tablet  Commonly known as: DESYREL  Take 2 tablets (200 mg total) by mouth every evening.     VIOS AEROSOL DELIVERY SYSTEM Shefali  Generic drug: nebulizer and compressor  USE AS DIRESTED         * This list has 2 medication(s) that are the same as other medications prescribed for you. Read the directions carefully, and ask your doctor or other care provider to review them with you.            STOP taking these medications    amoxicillin-clavulanate 875-125mg 875-125 mg per tablet  Commonly known as: AUGMENTIN     HYDROcodone-acetaminophen 5-325 mg per tablet  Commonly known as: NORCO  Replaced by: HYDROcodone-acetaminophen  mg per tablet            Indwelling Lines/Drains at time of discharge:   Lines/Drains/Airways     None                 Time spent on the discharge of patient: 35 minutes  Patient was seen and examined on the date of discharge and determined to be suitable for discharge.         Anthony Hester MD  Department of Hospital Medicine  [facility]

## 2020-07-03 NOTE — ASSESSMENT & PLAN NOTE
- Troponin stable at baseline.  No CP or anginal equivalent.  EKG unrevealing.  - Patient with known nonobstructive CAD per [facility] in 2016.  - Continue ASA, and statin.

## 2020-07-03 NOTE — NURSING
Went over discharge instructions with patient at bedside.  Stressed the importance of making and keeping all appointments.  Paper prescriptions given to pt. Instructed to get them filled at him local pharmacy.   Pt verbalized understanding.   Had all questions regarding discharge answered to satisfaction.  IV removed without complications.  Tele box removed and returned to monitor tech.  Waiting for personal transportation to arrive. Pt said his aunt should be here to pick him up around 1:00-2:00.

## 2020-07-03 NOTE — PT/OT/SLP PROGRESS
Physical Therapy  Treatment    Crow Green   MRN: [id number]   Admitting Diagnosis: Gangrene of left foot    PT Received On: 07/03/20  PT Start Time: 0730     PT Stop Time: 0755    PT Total Time (min): 25 min       Billable Minutes:  Gait Training 10 and Therapeutic Activity 15    Treatment Type: Treatment  PT/PTA: PT     PTA Visit Number: 0       General Precautions: Standard, fall  Orthopedic Precautions: (HEEL WB ONLY LLE WITH POST OP SHOE DONNED)   Braces: (POST OP SHOE FOR LLE)    Subjective:  Communicated with NURSE LAND prior to session.  Pain/Comfort  Pain Rating 1: 10/10  Location - Side 1: Left  Location - Orientation 1: generalized  Location 1: foot    Objective:   Patient found with: peripheral IV, telemetry    Functional Mobility:  Therapeutic Activities and Exercises:  PT FOUND SUPINE IN BED UPON ARRIVAL, REPORTS FEELING BETTER TODAY, SUP>SIT WITH SBA, SEATED SCOOT TO EOB SBA, PT DONNED R SHOE WITH SET UP, MAXA FOR DONNING L FOOT POST OP SHOE, REVIEW RW USE AND SAFETY DURING TF'S AND GAIT, REVIEW WB PRECAUTION FOR LLE, SIT>STAND WITH SBA, PT AMB 35' WITH RW AND SBA, NO LOB, GOOD COMPLIANCE TO WB STATUS, QUICK TO FATIGUE, F DYNAMIC BALANCE, PT RETURN TO ROOM TO BEDSIDE CHAIR WITH SBA, PT EDUCATED IN BLE THEREX TO PERFORM WHILE SEATED IN CHAIR    AM-PAC 6 CLICK MOBILITY  How much help from another person does this patient currently need?   1 = Unable, Total/Dependent Assistance  2 = A lot, Maximum/Moderate Assistance  3 = A little, Minimum/Contact Guard/Supervision  4 = None, Modified Kemp/Independent    Turning over in bed (including adjusting bedclothes, sheets and blankets)?: 4  Sitting down on and standing up from a chair with arms (e.g., wheelchair, bedside commode, etc.): 4  Moving from lying on back to sitting on the side of the bed?: 4  Moving to and from a bed to a chair (including a wheelchair)?: 4  Need to walk in hospital room?: 4  Climbing 3-5 steps with a railing?: 1  Basic  Mobility Total Score: 21    AM-PAC Raw Score CMS G-Code Modifier Level of Impairment Assistance   6 % Total / Unable   7 - 9 CM 80 - 100% Maximal Assist   10 - 14 CL 60 - 80% Moderate Assist   15 - 19 CK 40 - 60% Moderate Assist   20 - 22 CJ 20 - 40% Minimal Assist   23 CI 1-20% SBA / CGA   24 CH 0% Independent/ Mod I     Patient left up in chair with all lines intact, call button in reach and NURSE notified.    Assessment:  Crow Green is a 65 y.o. male with a medical diagnosis of Gangrene of left foot and presents with IMPAIRED FUNCTIONAL MOBILITY. PT WILL BENEFIT FROM CONT. SKILLED P.T. TO ADDRESS IMPAIRMENTS    Rehab identified problem list/impairments: Rehab identified problem list/impairments: weakness, impaired endurance, impaired balance, impaired functional mobilty, pain    Rehab potential is good.    Activity tolerance: Good    Discharge recommendations: Discharge Facility/Level of Care Needs: home health PT     Barriers to discharge:      Equipment recommendations: Equipment Needed After Discharge: walker, rolling, bath bench     GOALS:   Multidisciplinary Problems     Physical Therapy Goals        Problem: Physical Therapy Goal    Goal Priority Disciplines Outcome Goal Variances Interventions   Physical Therapy Goal     PT, PT/OT Ongoing, Progressing     Description: LTG'S TO BE MET IN 7 DAYS (7-9-20)  1. PT WILL BE FELIX WITH BED MOBILITY  2. PT WILL BE FELIX FOR TF'S  3. PT WILL ' WITH RW AND SPV                   PLAN:    Patient to be seen 5 x/week  to address the above listed problems via gait training, therapeutic activities, therapeutic exercises  Plan of Care expires: 07/09/20  Plan of Care reviewed with: patient    Linda Piyush, PT  07/03/2020

## 2020-07-03 NOTE — PLAN OF CARE
Pt AAO x4.  Pt remains free of falls throughout shift.  Pain addressed with Prn pain medication.   Plan of care reviewed. Pt verbalizes understanding.  Pt moving and turning independently. Frequent weight shifting encouraged.  Normal sinus rhythm on monitor.  Hourly rounding completed.  Pt being discharged today with Home Health.  Will continue to monitor.

## 2020-07-03 NOTE — PT/OT/SLP PROGRESS
"Occupational Therapy  Treatment    Crow Green   MRN: [id number]   Admitting Diagnosis: Gangrene of left foot    OT Date of Treatment: 07/03/20   OT Start Time: 0745  OT Stop Time: 0810  OT Total Time (min): 25 min    Billable Minutes:  Self Care/Home Management 10 min and Therapeutic Activity 15 min    General Precautions: Standard, fall  Orthopedic Precautions: (Heel WB on (L) LE with post op shoe donned)  Braces: (post op shoe)         Subjective:  Communicated with Nurse Banks and epic chart review prior to session.   Pt found supine in bed and agreeable to tx at this time.   Pain/Comfort  Pain Rating 1: 10/10  Location - Side 1: Left  Location 1: foot  Pain Addressed 1: Reposition, Distraction    Objective:  Patient found with: peripheral IV, telemetry     Functional Mobility:  Therapeutic Activities and Exercises:  Pt performed supine>sit with SBA, scooted to EOB SBA, donned Post-Op shoe on (L) LE with Total A and regular shoe on (R) with SBA, sit>stand using RW with SBA, functional mobility using RW with SBA ~35ft, t/f to chair using RW with SBA. Pt performed G/H tasks with setup while in sitting.     AM-PAC 6 CLICK ADL   How much help from another person does this patient currently need?   1 = Unable, Total/Dependent Assistance  2 = A lot, Maximum/Moderate Assistance  3 = A little, Minimum/Contact Guard/Supervision  4 = None, Modified [place]/Independent    Putting on and taking off regular lower body clothing? : 3  Bathing (including washing, rinsing, drying)?: 2  Toileting, which includes using toilet, bedpan, or urinal? : 3  Putting on and taking off regular upper body clothing?: 3  Taking care of personal grooming such as brushing teeth?: 3  Eating meals?: 4  Daily Activity Total Score: 18     AM-PAC Raw Score CMS "G-Code Modifier Level of Impairment Assistance   6 % Total / Unable   7 - 8 CM 80 - 100% Maximal Assist   9-13 CL 60 - 80% Moderate Assist   14 - 19 CK 40 - 60% Moderate " DAILY NOTE                                    



Name: Liang Jimenez                        Medical Record Number: [id number]

Note Date: 2020                             Date/Time: 2020 14:17:00

 

DOL: 5    Pos-Mens Age: 34wk 5d    Birth Gest: 34wk 0d      : 2018

Birth Weight: 1751 (gms) 

                    

DAILY PHYSICAL EXAM                                                             

Todays Weight: 1743 (gms)         Chg 24 hrs: --      Chg 7 days: --



Temperature   Heart Rate Resp Rate  BP - Sys   BP - Bahena  BP - Mean

98            135        36         58         30         39                    

Intensive cardiac and respiratory monitoring, continuous and/or frequent vital 

sign monitoring.



Bed Type:      Open Crib

General:       The infant is asleep, comfortable

Head/Neck:     Anterior fontanelle is soft and flat. NGT in place

Chest:         Clear, equal breath sounds.

Heart:         Regular rate and rhythm, without murmur. Pulses are normal.

Abdomen:       Soft and flat. No hepatosplenomegaly. Normal bowel sounds.

Genitalia:     Normal external genitalia are present.

Extremities:   No deformities noted.  Normal range of motion for all 

               extremities. 

Neurologic:    Normal tone and activity.

Skin:          The skin is pink and well perfused.  No rashes, vesicles, or 

               other lesions are noted.



MEDICATIONS

Active         Start Date Start Time      Stop Date  Dur(d) Comment

Multivitamins  2020                            1                          

with Iron



RESPIRATORY SUPPORT

Respiratory Support      Start Date Stop Date  Dur(d) Comment

Room Air                 2020            6



CULTURES

ACTIVE

Type            Date       Results        Organism       Comment:

Blood           2020 No Growth                     x 5d-final



INTAKE/OUTPUT

Fluid Type        Renny/oz     Dex % Prot g/kg Prot g/100mL Amt  Comment

EnfaCare          22                                      275



Weight Used for calculations: 1751 grams

Route: NG/PO



PLANNED INTAKE

FLUID TYPE: ENFACARE

Renny/oz   Dex %    Prot g/kg Prot g/100mL Amt  mL/feed feeds/day mL/hr mL/kg/da

22                                       280                          159.91



Number of Voids: 8

Voiding Quantity Sufficient



Total Output:  

Stools: 6 Last Stool: 2020





NUTRITIONAL SUPPORT

Diagnosis                Start Date End Date

Nutritional Support      2020



History                                                                         

34 weeker stable in RA after delivery

Assessment                                                                      

Tolerating full feeds and working on PO, completed 67% in last 24 hrs.          

Voiding/stooling appropriately. Only 8 g from BWT, now DOL 5.

Plan                                                                            

Continue feeds EBM22/Enfacare 22: po ad franca min 35 ml q3H.                      

Monitor PO vigor/volumes taken.                                                 



Begin MVI/Fe.

R/O [id number]

Diagnosis                Start Date End Date

R/O                      2020 2020 

[id number]



History                                                                         

Failed CEV1jxym. GBS unknown with adequate prophylaxis treatment. [id number]. 

Well appearing, clinically stable infant. No ABx started. BCx neg x 5d- final.  

Sepsis ruled out

LATE  INFANT 34 WKS

Diagnosis                Start Date End Date

Late  Infant 34   2020            

wks



History                                                                         

Late , IUGR.  Stable on RA, RW, PO/NGT feed. TcB peaked/declined without 

intervention.

Assessment                                                                      

OC with stable temps, RA, full feeds, working on PO.

Plan                                                                            

Follow clinically.                                                              

CST before d/c.

HEALTH MAINTENANCE

MATERNAL LABS

RPR/Serology: Non-Reactive HIV: Negative Rubella: Immune GBS: Unknown 

HBsAg: Negative



 SCREENING

Date                 Comment

2020 Done



HEARING SCREEN

Date                Type      Results   Comment

                    Auditory            before d/c                              

                    Screen



Parental Contact

Update parents when they call/visit.





_______________________________________ 

Martian Clark MD Assist   20 - 22 CJ 20 - 40% Minimal Assist   23 CI 1-20% SBA / CGA   24 CH 0% Independent/ Mod I       Patient left up in chair with all lines intact, call button in reach, chair alarm on and Nurse Jocelyn notified    ASSESSMENT:  Crow Green is a 65 y.o. male with a medical diagnosis of Gangrene of left foot and presents with impaired functional mobility and ADLs. Pt will benefit from continued skilled OT in order to address the listed impairments.     Rehab identified problem list/impairments: Rehab identified problem list/impairments: weakness, impaired endurance, impaired self care skills, impaired functional mobilty, gait instability, impaired balance, decreased coordination, decreased upper extremity function, decreased lower extremity function, decreased safety awareness, pain    Rehab potential is fair.    Activity tolerance: Fair    Discharge recommendations: Discharge Facility/Level of Care Needs: home with home health     Barriers to discharge: Barriers to Discharge: Decreased caregiver support    Equipment recommendations: walker, rolling     GOALS:   Multidisciplinary Problems     Occupational Therapy Goals        Problem: Occupational Therapy Goal    Goal Priority Disciplines Outcome Interventions   Occupational Therapy Goal     OT, PT/OT     Description: LTGs to be met by 7/9/2020  1. Pt will perform UE dressing with Min A  2. Pt will perform LE dressing with Min A  3. Pt will perform toilet t/f with Supervision  4. Pt will perform (B) UE ROM therapeutic exercises 1 x 20 reps                   Plan:  Patient to be seen 3 x/week to address the above listed problems via self-care/home management, therapeutic activities, therapeutic exercises  Plan of Care expires: 07/09/20  Plan of Care reviewed with: patient         Erika Roseolm, PT/OT  07/03/2020

## 2020-07-05 ENCOUNTER — PATIENT OUTREACH (OUTPATIENT)
Dept: ADMINISTRATIVE | Facility: CLINIC | Age: 65
End: 2020-07-05

## 2020-07-05 ENCOUNTER — HOSPITAL ENCOUNTER (INPATIENT)
Facility: HOSPITAL | Age: 65
LOS: 3 days | Discharge: HOME HEALTH | DRG: 312 | End: 2020-07-08
Attending: EMERGENCY MEDICINE | Admitting: INTERNAL MEDICINE
Payer: MEDICARE

## 2020-07-05 ENCOUNTER — NURSE TRIAGE (OUTPATIENT)
Dept: ADMINISTRATIVE | Facility: CLINIC | Age: 65
End: 2020-07-05

## 2020-07-05 DIAGNOSIS — S91.302A OPEN WOUND OF LEFT FOOT, INITIAL ENCOUNTER: ICD-10-CM

## 2020-07-05 DIAGNOSIS — R79.89 TROPONIN LEVEL ELEVATED: ICD-10-CM

## 2020-07-05 DIAGNOSIS — E86.0 DEHYDRATION: ICD-10-CM

## 2020-07-05 DIAGNOSIS — N17.9 ACUTE KIDNEY INJURY: ICD-10-CM

## 2020-07-05 DIAGNOSIS — I95.9 HYPOTENSION: Primary | ICD-10-CM

## 2020-07-05 DIAGNOSIS — R42 DIZZINESS: ICD-10-CM

## 2020-07-05 PROBLEM — M33.90 DERMATOMYOSITIS: Status: ACTIVE | Noted: 2020-07-05

## 2020-07-05 PROBLEM — I95.1 ORTHOSTATIC HYPOTENSION: Status: ACTIVE | Noted: 2020-07-05

## 2020-07-05 PROBLEM — M33.13 DERMATOMYOSITIS: Status: ACTIVE | Noted: 2020-07-05

## 2020-07-05 LAB
ALBUMIN SERPL BCP-MCNC: 2.7 G/DL (ref 3.5–5.2)
ALP SERPL-CCNC: 67 U/L (ref 55–135)
ALT SERPL W/O P-5'-P-CCNC: 23 U/L (ref 10–44)
ANION GAP SERPL CALC-SCNC: 12 MMOL/L (ref 8–16)
AST SERPL-CCNC: 17 U/L (ref 10–40)
BASOPHILS # BLD AUTO: 0.03 K/UL (ref 0–0.2)
BASOPHILS NFR BLD: 0.2 % (ref 0–1.9)
BILIRUB SERPL-MCNC: 0.2 MG/DL (ref 0.1–1)
BILIRUB UR QL STRIP: ABNORMAL
BNP SERPL-MCNC: 97 PG/ML (ref 0–99)
BUN SERPL-MCNC: 20 MG/DL (ref 8–23)
CALCIUM SERPL-MCNC: 9.2 MG/DL (ref 8.7–10.5)
CHLORIDE SERPL-SCNC: 104 MMOL/L (ref 95–110)
CLARITY UR: CLEAR
CO2 SERPL-SCNC: 20 MMOL/L (ref 23–29)
COLOR UR: YELLOW
CREAT SERPL-MCNC: 3.6 MG/DL (ref 0.5–1.4)
DIFFERENTIAL METHOD: ABNORMAL
EOSINOPHIL # BLD AUTO: 0.2 K/UL (ref 0–0.5)
EOSINOPHIL NFR BLD: 1.3 % (ref 0–8)
ERYTHROCYTE [DISTWIDTH] IN BLOOD BY AUTOMATED COUNT: 14.6 % (ref 11.5–14.5)
EST. GFR  (AFRICAN AMERICAN): 20 ML/MIN/1.73 M^2
EST. GFR  (NON AFRICAN AMERICAN): 17 ML/MIN/1.73 M^2
GLUCOSE SERPL-MCNC: 190 MG/DL (ref 70–110)
GLUCOSE UR QL STRIP: NEGATIVE
HCT VFR BLD AUTO: 32.1 % (ref 40–54)
HGB BLD-MCNC: 9.4 G/DL (ref 14–18)
HGB UR QL STRIP: NEGATIVE
IMM GRANULOCYTES # BLD AUTO: 0.13 K/UL (ref 0–0.04)
IMM GRANULOCYTES NFR BLD AUTO: 0.8 % (ref 0–0.5)
KETONES UR QL STRIP: ABNORMAL
LACTATE SERPL-SCNC: 1.4 MMOL/L (ref 0.5–2.2)
LACTATE SERPL-SCNC: 3.3 MMOL/L (ref 0.5–2.2)
LEUKOCYTE ESTERASE UR QL STRIP: NEGATIVE
LIPASE SERPL-CCNC: 72 U/L (ref 4–60)
LYMPHOCYTES # BLD AUTO: 1.3 K/UL (ref 1–4.8)
LYMPHOCYTES NFR BLD: 7.7 % (ref 18–48)
MCH RBC QN AUTO: 26 PG (ref 27–31)
MCHC RBC AUTO-ENTMCNC: 29.3 G/DL (ref 32–36)
MCV RBC AUTO: 89 FL (ref 82–98)
MONOCYTES # BLD AUTO: 0.9 K/UL (ref 0.3–1)
MONOCYTES NFR BLD: 5.2 % (ref 4–15)
NEUTROPHILS # BLD AUTO: 14.1 K/UL (ref 1.8–7.7)
NEUTROPHILS NFR BLD: 84.8 % (ref 38–73)
NITRITE UR QL STRIP: NEGATIVE
NRBC BLD-RTO: 0 /100 WBC
PH UR STRIP: 5 [PH] (ref 5–8)
PLATELET # BLD AUTO: 351 K/UL (ref 150–350)
PLATELET BLD QL SMEAR: ABNORMAL
PMV BLD AUTO: 9.4 FL (ref 9.2–12.9)
POCT GLUCOSE: 120 MG/DL (ref 70–110)
POCT GLUCOSE: 99 MG/DL (ref 70–110)
POTASSIUM SERPL-SCNC: 4.2 MMOL/L (ref 3.5–5.1)
PROT SERPL-MCNC: 8.3 G/DL (ref 6–8.4)
PROT UR QL STRIP: ABNORMAL
RBC # BLD AUTO: 3.61 M/UL (ref 4.6–6.2)
SARS-COV-2 RDRP RESP QL NAA+PROBE: NEGATIVE
SODIUM SERPL-SCNC: 136 MMOL/L (ref 136–145)
SP GR UR STRIP: >=1.03 (ref 1–1.03)
TROPONIN I SERPL DL<=0.01 NG/ML-MCNC: 0.1 NG/ML (ref 0–0.03)
TROPONIN I SERPL DL<=0.01 NG/ML-MCNC: 0.11 NG/ML (ref 0–0.03)
URN SPEC COLLECT METH UR: ABNORMAL
UROBILINOGEN UR STRIP-ACNC: NEGATIVE EU/DL
WBC # BLD AUTO: 16.58 K/UL (ref 3.9–12.7)

## 2020-07-05 PROCEDURE — 99291 CRITICAL CARE FIRST HOUR: CPT | Mod: 25,HCNC

## 2020-07-05 PROCEDURE — 93010 EKG 12-LEAD: ICD-10-PCS | Mod: HCNC,,, | Performed by: INTERNAL MEDICINE

## 2020-07-05 PROCEDURE — 96367 TX/PROPH/DG ADDL SEQ IV INF: CPT | Mod: HCNC

## 2020-07-05 PROCEDURE — 93005 ELECTROCARDIOGRAM TRACING: CPT | Mod: HCNC

## 2020-07-05 PROCEDURE — 82962 GLUCOSE BLOOD TEST: CPT | Mod: HCNC

## 2020-07-05 PROCEDURE — 83690 ASSAY OF LIPASE: CPT | Mod: HCNC

## 2020-07-05 PROCEDURE — 84484 ASSAY OF TROPONIN QUANT: CPT | Mod: HCNC

## 2020-07-05 PROCEDURE — 27000221 HC OXYGEN, UP TO 24 HOURS: Mod: HCNC

## 2020-07-05 PROCEDURE — 96375 TX/PRO/DX INJ NEW DRUG ADDON: CPT | Mod: HCNC

## 2020-07-05 PROCEDURE — 83605 ASSAY OF LACTIC ACID: CPT | Mod: 91,HCNC

## 2020-07-05 PROCEDURE — 96366 THER/PROPH/DIAG IV INF ADDON: CPT | Mod: HCNC

## 2020-07-05 PROCEDURE — U0002 COVID-19 LAB TEST NON-CDC: HCPCS | Mod: HCNC

## 2020-07-05 PROCEDURE — 96365 THER/PROPH/DIAG IV INF INIT: CPT | Mod: HCNC

## 2020-07-05 PROCEDURE — 93010 ELECTROCARDIOGRAM REPORT: CPT | Mod: HCNC,,, | Performed by: INTERNAL MEDICINE

## 2020-07-05 PROCEDURE — 11000001 HC ACUTE MED/SURG PRIVATE ROOM: Mod: HCNC

## 2020-07-05 PROCEDURE — 87040 BLOOD CULTURE FOR BACTERIA: CPT | Mod: 59,HCNC

## 2020-07-05 PROCEDURE — 63600175 PHARM REV CODE 636 W HCPCS: Mod: HCNC | Performed by: EMERGENCY MEDICINE

## 2020-07-05 PROCEDURE — 84484 ASSAY OF TROPONIN QUANT: CPT | Mod: 91,HCNC

## 2020-07-05 PROCEDURE — 25000003 PHARM REV CODE 250: Mod: HCNC | Performed by: PHYSICIAN ASSISTANT

## 2020-07-05 PROCEDURE — 96361 HYDRATE IV INFUSION ADD-ON: CPT

## 2020-07-05 PROCEDURE — 85025 COMPLETE CBC W/AUTO DIFF WBC: CPT | Mod: HCNC

## 2020-07-05 PROCEDURE — 80053 COMPREHEN METABOLIC PANEL: CPT | Mod: HCNC

## 2020-07-05 PROCEDURE — G0378 HOSPITAL OBSERVATION PER HR: HCPCS | Mod: HCNC

## 2020-07-05 PROCEDURE — 96361 HYDRATE IV INFUSION ADD-ON: CPT | Mod: HCNC

## 2020-07-05 PROCEDURE — 83880 ASSAY OF NATRIURETIC PEPTIDE: CPT | Mod: HCNC

## 2020-07-05 PROCEDURE — 81003 URINALYSIS AUTO W/O SCOPE: CPT | Mod: HCNC

## 2020-07-05 PROCEDURE — 25000003 PHARM REV CODE 250: Mod: HCNC | Performed by: EMERGENCY MEDICINE

## 2020-07-05 RX ORDER — SERTRALINE HYDROCHLORIDE 50 MG/1
100 TABLET, FILM COATED ORAL NIGHTLY
Status: DISCONTINUED | OUTPATIENT
Start: 2020-07-06 | End: 2020-07-08 | Stop reason: HOSPADM

## 2020-07-05 RX ORDER — TAMSULOSIN HYDROCHLORIDE 0.4 MG/1
0.4 CAPSULE ORAL DAILY
Status: DISCONTINUED | OUTPATIENT
Start: 2020-07-06 | End: 2020-07-08 | Stop reason: HOSPADM

## 2020-07-05 RX ORDER — IBUPROFEN 200 MG
16 TABLET ORAL
Status: DISCONTINUED | OUTPATIENT
Start: 2020-07-06 | End: 2020-07-08 | Stop reason: HOSPADM

## 2020-07-05 RX ORDER — BUDESONIDE 0.5 MG/2ML
0.5 INHALANT ORAL EVERY 12 HOURS
Status: DISCONTINUED | OUTPATIENT
Start: 2020-07-06 | End: 2020-07-08 | Stop reason: HOSPADM

## 2020-07-05 RX ORDER — ATORVASTATIN CALCIUM 40 MG/1
40 TABLET, FILM COATED ORAL NIGHTLY
Status: DISCONTINUED | OUTPATIENT
Start: 2020-07-06 | End: 2020-07-08 | Stop reason: HOSPADM

## 2020-07-05 RX ORDER — METOPROLOL SUCCINATE 50 MG/1
50 TABLET, EXTENDED RELEASE ORAL DAILY
Status: DISCONTINUED | OUTPATIENT
Start: 2020-07-06 | End: 2020-07-08 | Stop reason: HOSPADM

## 2020-07-05 RX ORDER — GLUCAGON 1 MG
1 KIT INJECTION
Status: DISCONTINUED | OUTPATIENT
Start: 2020-07-06 | End: 2020-07-08 | Stop reason: HOSPADM

## 2020-07-05 RX ORDER — INSULIN ASPART 100 [IU]/ML
0-5 INJECTION, SOLUTION INTRAVENOUS; SUBCUTANEOUS
Status: DISCONTINUED | OUTPATIENT
Start: 2020-07-06 | End: 2020-07-08 | Stop reason: HOSPADM

## 2020-07-05 RX ORDER — IPRATROPIUM BROMIDE AND ALBUTEROL SULFATE 2.5; .5 MG/3ML; MG/3ML
3 SOLUTION RESPIRATORY (INHALATION) EVERY 6 HOURS PRN
Status: DISCONTINUED | OUTPATIENT
Start: 2020-07-06 | End: 2020-07-08 | Stop reason: HOSPADM

## 2020-07-05 RX ORDER — HYDROCODONE BITARTRATE AND ACETAMINOPHEN 5; 325 MG/1; MG/1
1 TABLET ORAL EVERY 6 HOURS PRN
Status: DISCONTINUED | OUTPATIENT
Start: 2020-07-06 | End: 2020-07-07

## 2020-07-05 RX ORDER — TRAZODONE HYDROCHLORIDE 100 MG/1
200 TABLET ORAL NIGHTLY
Status: DISCONTINUED | OUTPATIENT
Start: 2020-07-06 | End: 2020-07-08 | Stop reason: HOSPADM

## 2020-07-05 RX ORDER — CEFEPIME HYDROCHLORIDE 1 G/50ML
2 INJECTION, SOLUTION INTRAVENOUS DAILY
Status: DISCONTINUED | OUTPATIENT
Start: 2020-07-06 | End: 2020-07-06

## 2020-07-05 RX ORDER — BACLOFEN 10 MG/1
10 TABLET ORAL DAILY
Status: DISCONTINUED | OUTPATIENT
Start: 2020-07-06 | End: 2020-07-08 | Stop reason: HOSPADM

## 2020-07-05 RX ORDER — SODIUM CHLORIDE 9 MG/ML
INJECTION, SOLUTION INTRAVENOUS CONTINUOUS
Status: DISCONTINUED | OUTPATIENT
Start: 2020-07-05 | End: 2020-07-08

## 2020-07-05 RX ORDER — IBUPROFEN 200 MG
24 TABLET ORAL
Status: DISCONTINUED | OUTPATIENT
Start: 2020-07-06 | End: 2020-07-08 | Stop reason: HOSPADM

## 2020-07-05 RX ORDER — ONDANSETRON 2 MG/ML
4 INJECTION INTRAMUSCULAR; INTRAVENOUS
Status: COMPLETED | OUTPATIENT
Start: 2020-07-05 | End: 2020-07-05

## 2020-07-05 RX ORDER — PANTOPRAZOLE SODIUM 40 MG/1
40 TABLET, DELAYED RELEASE ORAL DAILY
Status: DISCONTINUED | OUTPATIENT
Start: 2020-07-06 | End: 2020-07-08 | Stop reason: HOSPADM

## 2020-07-05 RX ORDER — PREDNISONE 5 MG/1
5 TABLET ORAL 2 TIMES DAILY
Status: DISCONTINUED | OUTPATIENT
Start: 2020-07-06 | End: 2020-07-08 | Stop reason: HOSPADM

## 2020-07-05 RX ORDER — ACETAMINOPHEN 325 MG/1
650 TABLET ORAL EVERY 6 HOURS PRN
Status: DISCONTINUED | OUTPATIENT
Start: 2020-07-06 | End: 2020-07-06

## 2020-07-05 RX ORDER — ASPIRIN 81 MG/1
81 TABLET ORAL DAILY
Status: DISCONTINUED | OUTPATIENT
Start: 2020-07-06 | End: 2020-07-08 | Stop reason: HOSPADM

## 2020-07-05 RX ORDER — ARFORMOTEROL TARTRATE 15 UG/2ML
15 SOLUTION RESPIRATORY (INHALATION) 2 TIMES DAILY
Status: DISCONTINUED | OUTPATIENT
Start: 2020-07-06 | End: 2020-07-08 | Stop reason: HOSPADM

## 2020-07-05 RX ORDER — ONDANSETRON 8 MG/1
8 TABLET, ORALLY DISINTEGRATING ORAL EVERY 8 HOURS PRN
Status: DISCONTINUED | OUTPATIENT
Start: 2020-07-06 | End: 2020-07-08 | Stop reason: HOSPADM

## 2020-07-05 RX ADMIN — VANCOMYCIN HYDROCHLORIDE 1750 MG: 100 INJECTION, POWDER, LYOPHILIZED, FOR SOLUTION INTRAVENOUS at 05:07

## 2020-07-05 RX ADMIN — SODIUM CHLORIDE 1000 ML: 0.9 INJECTION, SOLUTION INTRAVENOUS at 03:07

## 2020-07-05 RX ADMIN — ONDANSETRON 4 MG: 2 INJECTION INTRAMUSCULAR; INTRAVENOUS at 03:07

## 2020-07-05 RX ADMIN — CEFTRIAXONE 2 G: 2 INJECTION, SOLUTION INTRAVENOUS at 04:07

## 2020-07-05 RX ADMIN — SODIUM CHLORIDE 1457 ML: 0.9 INJECTION, SOLUTION INTRAVENOUS at 04:07

## 2020-07-05 RX ADMIN — SODIUM CHLORIDE: 0.9 INJECTION, SOLUTION INTRAVENOUS at 08:07

## 2020-07-05 NOTE — ED PROVIDER NOTES
SCRIBE #1 NOTE: I, Johny Case, am scribing for, and in the presence of, Ashwin Benavidez Jr., MD. I have scribed the entire note.      History      Chief Complaint   Patient presents with    Dizziness     since friday after being d/c from our facility. L toe amputation tuesday. per [facility] hypotensive 80/46 gave 300 ml bolus.        Review of patient's allergies indicates:   Allergen Reactions    Protamine Hives     Urticaria, possible upper airway swelling        HPI   HPI    7/5/2020, 3:15 PM   History obtained from the patient and [facility]      History of Present Illness: Crow Green is a 65 y.o. male patient with a PMHx of Afib, CHF, DM2, HTN who presents to the Emergency Department for nausea, onset 2 days PTA. Pt had a L toe amputation on 7/1/20, and was discharged on 7/3/20. Symptoms are constant and moderate in severity. No mitigating or exacerbating factors reported. Associated sxs include decreased appetite. Patient denies any fever, chills, vomiting, SOB, CP, weakness, numbness, dizziness, headache, and all other sxs at this time. [facility] reports that the pt was hypotensive at the scene with a BP of 80/46. Pt was given 300 mL bolus en route to the ED. No further complaints or concerns at this time.  Patient notes he has not eaten or drank much since his discharge.  He denies any chest pain.  Denies any fever.  Notes he is pending follow-up tomorrow for dressing change.    Arrival mode: [facility]    PCP: Mateo Lambert DO       Past Medical History:  Past Medical History:   Diagnosis Date    Acute kidney injury     Arthritis     Asthma     Atrial fibrillation     Atrial fibrillation with RVR 8/7/2019    CHF (congestive heart failure)     Depression     Diabetes mellitus, type 2 Diagnosed in 2000    Elevated PSA     Hypertension     Sleep apnea        Past Surgical History:  Past Surgical History:   Procedure Laterality Date    ABLATION OF ARRHYTHMOGENIC FOCUS FOR ATRIAL FIBRILLATION N/A  2/27/2020    Procedure: Ablation atrial fibrillation;  Surgeon: Gio Brown MD;  Location: [facility] EP LAB;  Service: Cardiology;  Laterality: N/A;  afib, DAMIEN (cx if SR), PVI, PITO, anes, MB, 3 Prep    CHOLECYSTECTOMY      CLOSURE OF WOUND Left 7/1/2020    Procedure: CLOSURE, WOUND;  Surgeon: Barb Veras DPM;  Location: [facility] OR;  Service: Podiatry;  Laterality: Left;    SELECTIVE INJECTION OF ANESTHETIC AGENT AROUND LUMBAR SPINAL NERVE ROOT BY TRANSFORAMINAL APPROACH Left 6/11/2020    Procedure: BLOCK, SPINAL NERVE ROOT, LUMBAR, SELECTIVE, TRANSFORAMINAL APPROACH Left L4-5, L5-S1 TFESI with RN IV sedation;  Surgeon: Dillan Tsai MD;  Location: [facility] PAIN MGT;  Service: Pain Management;  Laterality: Left;    TOE AMPUTATION Left 6/29/2020    Procedure: AMPUTATION, TOE;  Surgeon: Barb Veras DPM;  Location: [facility] OR;  Service: Podiatry;  Laterality: Left;  great toe         Family History:  Family History   Problem Relation Age of Onset    Glaucoma Mother     Cataracts Mother     Cataracts Father     Glaucoma Sister     Cataracts Sister     Glaucoma Maternal Aunt     No Known Problems Brother     No Known Problems Daughter     No Known Problems Son     Prostate cancer Neg Hx        Social History:  Social History     Tobacco Use    Smoking status: Never Smoker    Smokeless tobacco: Never Used   Substance and Sexual Activity    Alcohol use: No     Comment: occ    Drug use: No    Sexual activity: Yes     Partners: Female       ROS   Review of Systems   Constitutional: Positive for appetite change (decreased). Negative for fever.   HENT: Negative for sore throat.    Respiratory: Negative for shortness of breath.    Cardiovascular: Negative for chest pain.   Gastrointestinal: Positive for nausea. Negative for diarrhea and vomiting.   Genitourinary: Negative for dysuria.   Musculoskeletal: Negative for back pain.   Skin: Negative for rash.   Neurological: Negative for dizziness, seizures,  weakness, light-headedness, numbness and headaches.   Hematological: Does not bruise/bleed easily.   All other systems reviewed and are negative.    Physical Exam      Initial Vitals [07/05/20 1503]   BP Pulse Resp Temp SpO2   (!) 88/58 88 20 98.3 °F (36.8 °C) (!) 93 %      MAP       --          Physical Exam  Nursing Notes and Vital Signs Reviewed.  Constitutional: Patient is in no acute distress. Well-developed and well-nourished.  Head: Atraumatic. Normocephalic.  Eyes: . EOM intact. Conjunctivae are not pale. No scleral icterus.  ENT: Mucous membranes are moist. Oropharynx is clear and symmetric.  nares are clear  Neck: Supple. Full ROM. No lymphadenopathy.  Trachea midline  Cardiovascular: Regular rate. Regular rhythm. No murmurs, rubs, or gallops. Distal pulses are 2+ and symmetric.  Pulmonary/Chest: No respiratory distress. Clear to auscultation bilaterally. No wheezing or rales.  Abdominal: Soft and non-distended.  There is no tenderness.  No rebound, guarding, or rigidity.   Musculoskeletal: Moves all extremities. No obvious deformities. No edema.  Skin: Warm and dry.  There is an amputation of the left big toe.  Dressings have been removed the wound bed appears to be granulating well and is clean dry and intact.  There is no erythema to the foot or drainage.  There is no pus.  No abscess noted.  Neurological:  Alert, awake, and appropriate.  Normal speech.  No acute focal neurological deficits are appreciated.  Psychiatric: Normal affect. Good eye contact. Appropriate in content.                    ED Course    Critical Care    Date/Time: 7/5/2020 6:03 PM  Performed by: Ashwin Benavidez Jr., MD  Authorized by: Ashwin Benavidez Jr., MD   Direct patient critical care time: 25 minutes  Additional history critical care time: 10 minutes  Ordering / reviewing critical care time: 10 minutes  Documentation critical care time: 5 minutes  Consulting other physicians critical care time: 5 minutes  Consult with family  "critical care time: 5 minutes  Total critical care time (exclusive of procedural time) : 60 minutes  Critical care time was exclusive of separately billable procedures and treating other patients and teaching time.  Critical care was necessary to treat or prevent imminent or life-threatening deterioration of the following conditions: JOSE MARIA, elevated troponin, dehydration.  Critical care was time spent personally by me on the following activities: blood draw for specimens, development of treatment plan with patient or surrogate, discussions with consultants, interpretation of cardiac output measurements, evaluation of patient's response to treatment, examination of patient, obtaining history from patient or surrogate, ordering and performing treatments and interventions, ordering and review of laboratory studies, ordering and review of radiographic studies, pulse oximetry and re-evaluation of patient's condition.        ED Vital Signs:  Vitals:    07/05/20 1503 07/05/20 1504 07/05/20 1515 07/05/20 1517   BP: (!) 88/58 (!) 88/58  (!) 90/51   Pulse: 88 88 86 85   Resp: 20 20 20   Temp: 98.3 °F (36.8 °C)      TempSrc: Oral      SpO2: (!) 93% 96%  96%   Weight: 91.9 kg (202 lb 8 oz)      Height: 5' 5" (1.651 m)       07/05/20 1536 07/05/20 1546 07/05/20 1602 07/05/20 1703   BP: (!) 102/56 (!) 103/54 (!) 94/55 (!) 107/55   Pulse: 83 81 80 82   Resp: 20 20 20 20   Temp:       TempSrc:       SpO2: 97% 96% 95% 97%   Weight:       Height:        07/05/20 1732   BP: 131/71   Pulse: 83   Resp: 20   Temp:    TempSrc:    SpO2: 98%   Weight:    Height:        Abnormal Lab Results:  Labs Reviewed   CBC W/ AUTO DIFFERENTIAL - Abnormal; Notable for the following components:       Result Value    WBC 16.58 (*)     RBC 3.61 (*)     Hemoglobin 9.4 (*)     Hematocrit 32.1 (*)     Mean Corpuscular Hemoglobin 26.0 (*)     Mean Corpuscular Hemoglobin Conc 29.3 (*)     RDW 14.6 (*)     Platelets 351 (*)     Immature Granulocytes 0.8 (*)     " Gran # (ANC) 14.1 (*)     Immature Grans (Abs) 0.13 (*)     Gran% 84.8 (*)     Lymph% 7.7 (*)     Platelet Estimate Increased (*)     All other components within normal limits   COMPREHENSIVE METABOLIC PANEL - Abnormal; Notable for the following components:    CO2 20 (*)     Glucose 190 (*)     Creatinine 3.6 (*)     Albumin 2.7 (*)     eGFR if  20 (*)     eGFR if non  17 (*)     All other components within normal limits   TROPONIN I - Abnormal; Notable for the following components:    Troponin I 0.099 (*)     All other components within normal limits   LACTIC ACID, PLASMA - Abnormal; Notable for the following components:    Lactate (Lactic Acid) 3.3 (*)     All other components within normal limits   URINALYSIS, REFLEX TO URINE CULTURE - Abnormal; Notable for the following components:    Specific Gravity, UA >=1.030 (*)     Protein, UA Trace (*)     Ketones, UA 1+ (*)     Bilirubin (UA) 1+ (*)     All other components within normal limits    Narrative:     Specimen Source->Urine   LIPASE - Abnormal; Notable for the following components:    Lipase 72 (*)     All other components within normal limits   CULTURE, BLOOD   CULTURE, BLOOD   B-TYPE NATRIURETIC PEPTIDE   LACTIC ACID, PLASMA        All Lab Results:  Results for orders placed or performed during the hospital encounter of 07/05/20   CBC auto differential   Result Value Ref Range    WBC 16.58 (H) 3.90 - 12.70 K/uL    RBC 3.61 (L) 4.60 - 6.20 M/uL    Hemoglobin 9.4 (L) 14.0 - 18.0 g/dL    Hematocrit 32.1 (L) 40.0 - 54.0 %    Mean Corpuscular Volume 89 82 - 98 fL    Mean Corpuscular Hemoglobin 26.0 (L) 27.0 - 31.0 pg    Mean Corpuscular Hemoglobin Conc 29.3 (L) 32.0 - 36.0 g/dL    RDW 14.6 (H) 11.5 - 14.5 %    Platelets 351 (H) 150 - 350 K/uL    MPV 9.4 9.2 - 12.9 fL    Immature Granulocytes 0.8 (H) 0.0 - 0.5 %    Gran # (ANC) 14.1 (H) 1.8 - 7.7 K/uL    Immature Grans (Abs) 0.13 (H) 0.00 - 0.04 K/uL    Lymph # 1.3 1.0 - 4.8 K/uL     Mono # 0.9 0.3 - 1.0 K/uL    Eos # 0.2 0.0 - 0.5 K/uL    Baso # 0.03 0.00 - 0.20 K/uL    nRBC 0 0 /100 WBC    Gran% 84.8 (H) 38.0 - 73.0 %    Lymph% 7.7 (L) 18.0 - 48.0 %    Mono% 5.2 4.0 - 15.0 %    Eosinophil% 1.3 0.0 - 8.0 %    Basophil% 0.2 0.0 - 1.9 %    Platelet Estimate Increased (A)     Differential Method Automated    Comprehensive metabolic panel   Result Value Ref Range    Sodium 136 136 - 145 mmol/L    Potassium 4.2 3.5 - 5.1 mmol/L    Chloride 104 95 - 110 mmol/L    CO2 20 (L) 23 - 29 mmol/L    Glucose 190 (H) 70 - 110 mg/dL    BUN, Bld 20 8 - 23 mg/dL    Creatinine 3.6 (H) 0.5 - 1.4 mg/dL    Calcium 9.2 8.7 - 10.5 mg/dL    Total Protein 8.3 6.0 - 8.4 g/dL    Albumin 2.7 (L) 3.5 - 5.2 g/dL    Total Bilirubin 0.2 0.1 - 1.0 mg/dL    Alkaline Phosphatase 67 55 - 135 U/L    AST 17 10 - 40 U/L    ALT 23 10 - 44 U/L    Anion Gap 12 8 - 16 mmol/L    eGFR if African American 20 (A) >60 mL/min/1.73 m^2    eGFR if non African American 17 (A) >60 mL/min/1.73 m^2   Troponin I   Result Value Ref Range    Troponin I 0.099 (H) 0.000 - 0.026 ng/mL   Brain natriuretic peptide   Result Value Ref Range    BNP 97 0 - 99 pg/mL   Lactic acid, plasma #1   Result Value Ref Range    Lactate (Lactic Acid) 3.3 (H) 0.5 - 2.2 mmol/L   Urinalysis, Reflex to Urine Culture Urine, Clean Catch    Specimen: Urine   Result Value Ref Range    Specimen UA Urine, Clean Catch     Color, UA Yellow Yellow, Straw, Angela    Appearance, UA Clear Clear    pH, UA 5.0 5.0 - 8.0    Specific Gravity, UA >=1.030 (A) 1.005 - 1.030    Protein, UA Trace (A) Negative    Glucose, UA Negative Negative    Ketones, UA 1+ (A) Negative    Bilirubin (UA) 1+ (A) Negative    Occult Blood UA Negative Negative    Nitrite, UA Negative Negative    Urobilinogen, UA Negative <2.0 EU/dL    Leukocytes, UA Negative Negative   Lipase   Result Value Ref Range    Lipase 72 (H) 4 - 60 U/L     *Note: Due to a large number of results and/or encounters for the requested time  period, some results have not been displayed. A complete set of results can be found in Results Review.     Imaging Results:  Imaging Results          X-Ray Chest AP Portable (Final result)  Result time 07/05/20 15:51:20    Final result by Jayjay De La Rosa MD (07/05/20 15:51:20)                 Impression:      No acute abnormality.      Electronically signed by: Jayjay De La Rsoa  Date:    07/05/2020  Time:    15:51             Narrative:    EXAMINATION:  XR CHEST AP PORTABLE    CLINICAL HISTORY:  Sepsis;.    TECHNIQUE:  Single frontal portable view of the chest was performed.    COMPARISON:  None    FINDINGS:  Support devices: None    The lungs are clear, with normal appearance of pulmonary vasculature and no pleural effusion or pneumothorax.    The cardiac silhouette is normal in size. The hilar and mediastinal contours are unremarkable.    Bones are intact.                               The EKG was ordered, reviewed, and independently interpreted by the ED provider.  Interpretation time: 15:06  Rate: 86 BPM  Rhythm: Sinus rhythm with 1st degree AV block.  Interpretation: Left anterior fascicular block. Nonspecific ST and T wave abnormality. No STEMI.           The Emergency Provider reviewed the vital signs and test results, which are outlined above.    ED Discussion     3:35 PM: Re-evaluated pt. Pt is resting comfortably and is in no acute distress. Pt's BP has improved after IVF.  D/w pt all pertinent results. D/w pt any concerns expressed at this time. Answered all questions. Pt expresses understanding at this time.    4:50 PM  The patient's blood pressures improved with IV fluids.  His noted that his creatinine is 3 which is a new acute renal insufficiency.  This is likely due to decreased oral intake and dehydration as his fluids have improved his blood pressure.  However, the patient does have elevated lactic acid which may be due to possible underlying infection verses hypoperfusion from dehydration.  Clinically  this is a dehydration issue however the patient has been dosed with antibiotics for possible skin infection.  Patient is stable.    4:52 PM: 30mL/kg IVF have been administered within 3 hours of identification of SIRS criteria. Vital signs were reviewed and a focal sepsis perfusion assessment was performed.    5:52 PM: Re-evaluated pt. I have discussed test results, shared treatment plan, and the need for admission with patient at bedside. Pt expresses understanding at this time and agree with all information. All questions answered. Pt has no further questions or concerns at this time. Pt is ready for admit.    6:01 PM: Discussed case with Margo Rouse NP (Hospital Medicine). Dr. Baumann agrees with current care and management of pt and accepts admission.   Admitting Service: Hospital Medicine  Admitting Physician: Dr. Baumann  Admit to: Obs Tele     6:05 PM  Patient is stable and nontoxic.  Patient presented hypotensive was likely due to decreased p.o. intake from nausea from his pain medications from his recent toe amputation.  The toe does not appear to be infected appears to be healing well.  The patient does have a mild bump in his lactic acid however this may be volume related.  Pressure came up very quickly with IV fluids.  He shows acute kidney injury with a creatinine of 3.  Patient is being admitted to Hospital Medicine for further evaluation and treatment.  Patient is aware    ED Medication(s):  Medications   vancomycin - pharmacy to dose (has no administration in time range)   vancomycin 1.75 g in 5 % dextrose 500 mL IVPB (1,750 mg Intravenous New Bag 7/5/20 8146)   sodium chloride 0.9% bolus 1,000 mL (0 mLs Intravenous Stopped 7/5/20 1635)   ondansetron injection 4 mg (4 mg Intravenous Given 7/5/20 1532)   sodium chloride 0.9% bolus 2,757 mL (0 mL/kg × 91.9 kg Intravenous Stopped 7/5/20 1801)   cefTRIAXone (ROCEPHIN) 2 g/50 mL D5W IVPB (0 g Intravenous Stopped 7/5/20 1744)          New Prescriptions     No medications on file         Medical Decision Making    Medical Decision Making:   Clinical Tests:   Lab Tests: Ordered and Reviewed  Radiological Study: Ordered and Reviewed  Medical Tests: Ordered and Reviewed           Scribe Attestation:   Scribe #1: I performed the above scribed service and the documentation accurately describes the services I performed. I attest to the accuracy of the note.    Attending:   Physician Attestation Statement for Scribe #1: I, Ashwin Benavidez Jr., MD, personally performed the services described in this documentation, as scribed by Johny Case, in my presence, and it is both accurate and complete.          Clinical Impression       ICD-10-CM ICD-9-CM   1. Dehydration  E86.0 276.51   2. Dizziness  R42 780.4   3. Hypotension  I95.9 458.9   4. Acute kidney injury  N17.9 584.9   5. Troponin level elevated  R79.89 790.6   6. Open wound of left foot, initial encounter  S91.302A 892.0       Disposition:   Disposition: Placed in Observation  Condition: Fair         Ashwin Benavidez Jr., MD  07/05/20 9887

## 2020-07-05 NOTE — PROGRESS NOTES
C3 nurse attempted to contact patient. The following occurred:   C3 nurse attempted to contact Crow Green for a TCC post hospital discharge follow up call. The patient is unable to conduct the call @ this time. The patient requested a callback.    The patient has a scheduled HOSFU appointment with Mateo Lambert DO on 7/9 @ 1020. Message sent to Physician staff.

## 2020-07-05 NOTE — TELEPHONE ENCOUNTER
Fell last night and fell again trying to go to bathroom.denies syncope but c/o  Dizziness and right leg weakness. instructed pt to call 911. States he will.     Reason for Disposition   [1] Weakness (i.e., paralysis, loss of muscle strength) of the face, arm or leg on one side of the body AND [2] sudden onset AND [3] present now    Additional Information   Negative: Still unconscious   Negative: Difficult to awaken or acting confused (e.g., disoriented, slurred speech)    Protocols used: DIZZINESS - VERTIGO-A-AH, [id number]

## 2020-07-06 ENCOUNTER — EXTERNAL HOME HEALTH (OUTPATIENT)
Dept: HOME HEALTH SERVICES | Facility: HOSPITAL | Age: 65
End: 2020-07-06
Payer: MEDICARE

## 2020-07-06 ENCOUNTER — PATIENT OUTREACH (OUTPATIENT)
Dept: ADMINISTRATIVE | Facility: OTHER | Age: 65
End: 2020-07-06

## 2020-07-06 LAB
ANION GAP SERPL CALC-SCNC: 8 MMOL/L (ref 8–16)
BACTERIA SPEC ANAEROBE CULT: ABNORMAL
BASOPHILS # BLD AUTO: 0.02 K/UL (ref 0–0.2)
BASOPHILS NFR BLD: 0.1 % (ref 0–1.9)
BUN SERPL-MCNC: 22 MG/DL (ref 8–23)
CALCIUM SERPL-MCNC: 8.2 MG/DL (ref 8.7–10.5)
CHLORIDE SERPL-SCNC: 109 MMOL/L (ref 95–110)
CO2 SERPL-SCNC: 22 MMOL/L (ref 23–29)
CREAT SERPL-MCNC: 2.3 MG/DL (ref 0.5–1.4)
DIFFERENTIAL METHOD: ABNORMAL
EOSINOPHIL # BLD AUTO: 0 K/UL (ref 0–0.5)
EOSINOPHIL NFR BLD: 0.1 % (ref 0–8)
ERYTHROCYTE [DISTWIDTH] IN BLOOD BY AUTOMATED COUNT: 14.3 % (ref 11.5–14.5)
EST. GFR  (AFRICAN AMERICAN): 34 ML/MIN/1.73 M^2
EST. GFR  (NON AFRICAN AMERICAN): 29 ML/MIN/1.73 M^2
GLUCOSE SERPL-MCNC: 93 MG/DL (ref 70–110)
HCT VFR BLD AUTO: 27.7 % (ref 40–54)
HGB BLD-MCNC: 8.3 G/DL (ref 14–18)
IMM GRANULOCYTES # BLD AUTO: 0.12 K/UL (ref 0–0.04)
IMM GRANULOCYTES NFR BLD AUTO: 0.7 % (ref 0–0.5)
LYMPHOCYTES # BLD AUTO: 1.4 K/UL (ref 1–4.8)
LYMPHOCYTES NFR BLD: 8.6 % (ref 18–48)
MAGNESIUM SERPL-MCNC: 1.8 MG/DL (ref 1.6–2.6)
MCH RBC QN AUTO: 26.5 PG (ref 27–31)
MCHC RBC AUTO-ENTMCNC: 30 G/DL (ref 32–36)
MCV RBC AUTO: 89 FL (ref 82–98)
MONOCYTES # BLD AUTO: 0.7 K/UL (ref 0.3–1)
MONOCYTES NFR BLD: 4.2 % (ref 4–15)
NEUTROPHILS # BLD AUTO: 14.3 K/UL (ref 1.8–7.7)
NEUTROPHILS NFR BLD: 86.3 % (ref 38–73)
NRBC BLD-RTO: 0 /100 WBC
PHOSPHATE SERPL-MCNC: 3.2 MG/DL (ref 2.7–4.5)
PLATELET # BLD AUTO: 312 K/UL (ref 150–350)
PMV BLD AUTO: 9.8 FL (ref 9.2–12.9)
POCT GLUCOSE: 116 MG/DL (ref 70–110)
POCT GLUCOSE: 138 MG/DL (ref 70–110)
POCT GLUCOSE: 73 MG/DL (ref 70–110)
POCT GLUCOSE: 83 MG/DL (ref 70–110)
POCT GLUCOSE: 84 MG/DL (ref 70–110)
POTASSIUM SERPL-SCNC: 4.8 MMOL/L (ref 3.5–5.1)
RBC # BLD AUTO: 3.13 M/UL (ref 4.6–6.2)
SODIUM SERPL-SCNC: 139 MMOL/L (ref 136–145)
TROPONIN I SERPL DL<=0.01 NG/ML-MCNC: 0.08 NG/ML (ref 0–0.03)
WBC # BLD AUTO: 16.54 K/UL (ref 3.9–12.7)

## 2020-07-06 PROCEDURE — 94640 AIRWAY INHALATION TREATMENT: CPT | Mod: HCNC

## 2020-07-06 PROCEDURE — 94761 N-INVAS EAR/PLS OXIMETRY MLT: CPT | Mod: HCNC

## 2020-07-06 PROCEDURE — 83735 ASSAY OF MAGNESIUM: CPT | Mod: HCNC

## 2020-07-06 PROCEDURE — 85025 COMPLETE CBC W/AUTO DIFF WBC: CPT | Mod: HCNC

## 2020-07-06 PROCEDURE — 27000221 HC OXYGEN, UP TO 24 HOURS: Mod: HCNC

## 2020-07-06 PROCEDURE — 80048 BASIC METABOLIC PNL TOTAL CA: CPT | Mod: HCNC

## 2020-07-06 PROCEDURE — 99900035 HC TECH TIME PER 15 MIN (STAT): Mod: HCNC

## 2020-07-06 PROCEDURE — 63600175 PHARM REV CODE 636 W HCPCS: Mod: HCNC | Performed by: PHYSICIAN ASSISTANT

## 2020-07-06 PROCEDURE — 36415 COLL VENOUS BLD VENIPUNCTURE: CPT | Mod: HCNC

## 2020-07-06 PROCEDURE — 84100 ASSAY OF PHOSPHORUS: CPT | Mod: HCNC

## 2020-07-06 PROCEDURE — 21400001 HC TELEMETRY ROOM: Mod: HCNC

## 2020-07-06 PROCEDURE — 11000001 HC ACUTE MED/SURG PRIVATE ROOM: Mod: HCNC

## 2020-07-06 PROCEDURE — 96374 THER/PROPH/DIAG INJ IV PUSH: CPT | Mod: 59

## 2020-07-06 PROCEDURE — 96361 HYDRATE IV INFUSION ADD-ON: CPT

## 2020-07-06 PROCEDURE — 25000003 PHARM REV CODE 250: Mod: HCNC | Performed by: INTERNAL MEDICINE

## 2020-07-06 PROCEDURE — 25000242 PHARM REV CODE 250 ALT 637 W/ HCPCS: Mod: HCNC | Performed by: PHYSICIAN ASSISTANT

## 2020-07-06 PROCEDURE — 84484 ASSAY OF TROPONIN QUANT: CPT | Mod: HCNC

## 2020-07-06 PROCEDURE — 25000003 PHARM REV CODE 250: Mod: HCNC | Performed by: PHYSICIAN ASSISTANT

## 2020-07-06 RX ORDER — BUTALBITAL, ACETAMINOPHEN AND CAFFEINE 50; 325; 40 MG/1; MG/1; MG/1
1 TABLET ORAL EVERY 6 HOURS PRN
Status: DISCONTINUED | OUTPATIENT
Start: 2020-07-06 | End: 2020-07-08 | Stop reason: HOSPADM

## 2020-07-06 RX ORDER — LATANOPROST 50 UG/ML
1 SOLUTION/ DROPS OPHTHALMIC NIGHTLY
Status: DISCONTINUED | OUTPATIENT
Start: 2020-07-06 | End: 2020-07-08 | Stop reason: HOSPADM

## 2020-07-06 RX ORDER — AMOXICILLIN AND CLAVULANATE POTASSIUM 875; 125 MG/1; MG/1
1 TABLET, FILM COATED ORAL EVERY 12 HOURS
Status: DISCONTINUED | OUTPATIENT
Start: 2020-07-06 | End: 2020-07-08 | Stop reason: HOSPADM

## 2020-07-06 RX ORDER — ACETAMINOPHEN 325 MG/1
650 TABLET ORAL EVERY 6 HOURS PRN
Status: DISCONTINUED | OUTPATIENT
Start: 2020-07-06 | End: 2020-07-08 | Stop reason: HOSPADM

## 2020-07-06 RX ORDER — GABAPENTIN 100 MG/1
100 CAPSULE ORAL 3 TIMES DAILY
Status: DISCONTINUED | OUTPATIENT
Start: 2020-07-06 | End: 2020-07-08 | Stop reason: HOSPADM

## 2020-07-06 RX ORDER — DOXYCYCLINE HYCLATE 100 MG
100 TABLET ORAL EVERY 12 HOURS
Status: DISCONTINUED | OUTPATIENT
Start: 2020-07-06 | End: 2020-07-08 | Stop reason: HOSPADM

## 2020-07-06 RX ADMIN — APIXABAN 5 MG: 2.5 TABLET, FILM COATED ORAL at 08:07

## 2020-07-06 RX ADMIN — PANTOPRAZOLE SODIUM 40 MG: 40 TABLET, DELAYED RELEASE ORAL at 08:07

## 2020-07-06 RX ADMIN — AMOXICILLIN AND CLAVULANATE POTASSIUM 1 TABLET: 875; 125 TABLET, FILM COATED ORAL at 08:07

## 2020-07-06 RX ADMIN — SERTRALINE HYDROCHLORIDE 100 MG: 50 TABLET ORAL at 12:07

## 2020-07-06 RX ADMIN — METOPROLOL SUCCINATE 50 MG: 50 TABLET, EXTENDED RELEASE ORAL at 08:07

## 2020-07-06 RX ADMIN — APIXABAN 5 MG: 2.5 TABLET, FILM COATED ORAL at 12:07

## 2020-07-06 RX ADMIN — PREDNISONE 5 MG: 5 TABLET ORAL at 08:07

## 2020-07-06 RX ADMIN — AMOXICILLIN AND CLAVULANATE POTASSIUM 1 TABLET: 875; 125 TABLET, FILM COATED ORAL at 11:07

## 2020-07-06 RX ADMIN — DOXYCYCLINE HYCLATE 100 MG: 100 TABLET, COATED ORAL at 11:07

## 2020-07-06 RX ADMIN — HYDROCODONE BITARTRATE AND ACETAMINOPHEN 1 TABLET: 5; 325 TABLET ORAL at 11:07

## 2020-07-06 RX ADMIN — BACLOFEN 10 MG: 10 TABLET ORAL at 08:07

## 2020-07-06 RX ADMIN — CEFEPIME HYDROCHLORIDE 2 G: 2 INJECTION, SOLUTION INTRAVENOUS at 08:07

## 2020-07-06 RX ADMIN — TAMSULOSIN HYDROCHLORIDE 0.4 MG: 0.4 CAPSULE ORAL at 08:07

## 2020-07-06 RX ADMIN — DOXYCYCLINE HYCLATE 100 MG: 100 TABLET, COATED ORAL at 08:07

## 2020-07-06 RX ADMIN — BUTALBITAL, ACETAMINOPHEN, AND CAFFEINE 1 TABLET: 50; 325; 40 TABLET ORAL at 08:07

## 2020-07-06 RX ADMIN — PREDNISONE 5 MG: 5 TABLET ORAL at 12:07

## 2020-07-06 RX ADMIN — SERTRALINE HYDROCHLORIDE 100 MG: 50 TABLET ORAL at 08:07

## 2020-07-06 RX ADMIN — ASPIRIN 81 MG: 81 TABLET, COATED ORAL at 08:07

## 2020-07-06 RX ADMIN — GABAPENTIN 100 MG: 100 CAPSULE ORAL at 09:07

## 2020-07-06 RX ADMIN — ATORVASTATIN CALCIUM 40 MG: 40 TABLET, FILM COATED ORAL at 08:07

## 2020-07-06 RX ADMIN — SODIUM CHLORIDE: 0.9 INJECTION, SOLUTION INTRAVENOUS at 03:07

## 2020-07-06 RX ADMIN — ATORVASTATIN CALCIUM 40 MG: 40 TABLET, FILM COATED ORAL at 12:07

## 2020-07-06 RX ADMIN — ACETAMINOPHEN 650 MG: 325 TABLET ORAL at 02:07

## 2020-07-06 RX ADMIN — BUDESONIDE 0.5 MG: 0.5 SUSPENSION RESPIRATORY (INHALATION) at 07:07

## 2020-07-06 RX ADMIN — ARFORMOTEROL TARTRATE 15 MCG: 15 SOLUTION RESPIRATORY (INHALATION) at 07:07

## 2020-07-06 RX ADMIN — TRAZODONE HYDROCHLORIDE 200 MG: 100 TABLET ORAL at 12:07

## 2020-07-06 RX ADMIN — TRAZODONE HYDROCHLORIDE 200 MG: 100 TABLET ORAL at 08:07

## 2020-07-06 RX ADMIN — LATANOPROST 1 DROP: 50 SOLUTION OPHTHALMIC at 09:07

## 2020-07-06 NOTE — PROGRESS NOTES
Pharmacokinetic Initial Assessment: IV Vancomycin    Assessment/Plan:    Initiate intravenous vancomycin with loading dose of 1750 mg once with subsequent doses when random concentrations are less than 20 mcg/mL  Desired empiric serum trough concentration is 10 to 15 mcg/mL  Draw vancomycin random level on 7/6 at 17:45.  Pharmacy will continue to follow and monitor vancomycin.      Please contact pharmacy at extension [phone] with any questions regarding this assessment.     Thank you for the consult,   Cm FISCHER Andrewburtonne       Patient brief summary:  Crow Green is a 65 y.o. male initiated on antimicrobial therapy with IV Vancomycin for treatment of suspected skin & soft tissue infection    Drug Allergies:   Review of patient's allergies indicates:   Allergen Reactions    Protamine Hives     Urticaria, possible upper airway swelling       Actual Body Weight:   91.9kg    Renal Function:   Estimated Creatinine Clearance: 21.9 mL/min (A) (based on SCr of 3.6 mg/dL (H)).,     Dialysis Method (if applicable):  N/A    CBC (last 72 hours):  Recent Labs   Lab Result Units 07/03/20 0523 07/05/20  1522   WBC K/uL 10.99 16.58*   Hemoglobin g/dL 8.8* 9.4*   Hematocrit % 29.3* 32.1*   Platelets K/uL 237 351*   Gran% % 73.4* 84.8*   Lymph% % 14.4* 7.7*   Mono% % 8.6 5.2   Eosinophil% % 2.7 1.3   Basophil% % 0.2 0.2   Differential Method  Automated Automated       Metabolic Panel (last 72 hours):  Recent Labs   Lab Result Units 07/03/20 0523 07/05/20  1522 07/05/20  1728   Sodium mmol/L 134* 136  --    Potassium mmol/L 4.2 4.2  --    Chloride mmol/L 98 104  --    CO2 mmol/L 26 20*  --    Glucose mg/dL 162* 190*  --    Glucose, UA   --   --  Negative   BUN, Bld mg/dL 14 20  --    Creatinine mg/dL 0.9 3.6*  --    Albumin g/dL  --  2.7*  --    Total Bilirubin mg/dL  --  0.2  --    Alkaline Phosphatase U/L  --  67  --    AST U/L  --  17  --    ALT U/L  --  23  --        Drug levels (last 3 results):  No results for input(s):  VANCOMYCINRA, VANCOMYCINPE, VANCOMYCINTR in the last 72 hours.    Microbiologic Results:  Microbiology Results (last 7 days)       Procedure Component Value Units Date/Time    Blood culture x two cultures. Draw prior to antibiotics. [[id number]] Collected: 07/05/20 1531    Order Status: Sent Specimen: Blood from Peripheral, Antecubital, Right Updated: 07/05/20 1954    Blood culture x two cultures. Draw prior to antibiotics. [[id number]] Collected: 07/05/20 1605    Order Status: Sent Specimen: Blood from Peripheral, Hand, Right Updated: 07/05/20 1954

## 2020-07-06 NOTE — PLAN OF CARE
Pt remain free falls and injuries this shift, pt is resting comfortably with call light within reach, pain controlled with PRN meds, bed in lowest position and locked, VSS will continue to monitor

## 2020-07-06 NOTE — PROGRESS NOTES
60 Day Recert Order # [id number]  New Cert Period: 06/30/2020 to 08/28/2020 with [facility] - Dr. Mateo Lambert

## 2020-07-06 NOTE — H&P
[facility] Hospital Medicine  History & Physical    Patient Name: Crow Green  MRN: [id number]  Admission Date: 7/5/2020  Attending Physician: Jose Baumann MD   Primary Care Provider: Mateo Lambert DO         Patient information was obtained from patient, past medical records and ER records.     Subjective:     Principal Problem:Hypotension    Chief Complaint:   Chief Complaint   Patient presents with    Dizziness     since friday after being d/c from our facility. L toe amputation tuesday. per [facility] hypotensive 80/46 gave 300 ml bolus.         HPI: Crow Green is a 65 year old male with DM II, CHF, atrial fibrillation and hypertension who presented to the ED with complaints of dizziness and falls at home. He states that symptoms began two days prior to presentation and typically occur when his moving from a sitting to a standing position. He also admits to nausea and poor intake since being discharged from the hospital on 7/3/20. He believes these symptoms are due to the doxycycline and Norco he is taking. The patient was hospitalized for an infected left great toe and underwent initial amputation on 6/29/20 with wound closure on 7/1/20. He is unsure if he is losing consciousness when he falls, but reports difficulty getting up. He denies fever, cough and shortness of breath. In the ED today, his creatinine was 3.6 which is elevated from 0.9 on 7/3/20. Other labs were significant for a WBC count of 16,580, albumin of 2.7, initial troponin of 0.099, initial lactic acid of 3.3 with a normal repeat and elevated specific gravity in his urine. Code status was discussed with the patient. He is a full code. His aunt, Awilda Moreno, is his surrogate medical decision maker.     Past Medical History:   Diagnosis Date    Arthritis     Asthma     Atrial fibrillation     Atrial fibrillation with RVR 8/7/2019    CHF (congestive heart failure)     Depression     Diabetes mellitus,  type 2 Diagnosed in 2000    Elevated PSA     Hypertension     Sleep apnea        Past Surgical History:   Procedure Laterality Date    ABLATION OF ARRHYTHMOGENIC FOCUS FOR ATRIAL FIBRILLATION N/A 2/27/2020    Procedure: Ablation atrial fibrillation;  Surgeon: Gio Brown MD;  Location: [facility] EP LAB;  Service: Cardiology;  Laterality: N/A;  afib, DAMIEN (cx if SR), PVI, PITO, anes, MB, 3 Prep    CHOLECYSTECTOMY      CLOSURE OF WOUND Left 7/1/2020    Procedure: CLOSURE, WOUND;  Surgeon: Barb Veras DPM;  Location: [facility] OR;  Service: Podiatry;  Laterality: Left;    SELECTIVE INJECTION OF ANESTHETIC AGENT AROUND LUMBAR SPINAL NERVE ROOT BY TRANSFORAMINAL APPROACH Left 6/11/2020    Procedure: BLOCK, SPINAL NERVE ROOT, LUMBAR, SELECTIVE, TRANSFORAMINAL APPROACH Left L4-5, L5-S1 TFESI with RN IV sedation;  Surgeon: Dillan Tsai MD;  Location: [facility] PAIN MGT;  Service: Pain Management;  Laterality: Left;    TOE AMPUTATION Left 6/29/2020    Procedure: AMPUTATION, TOE;  Surgeon: Brab Veras DPM;  Location: [facility] OR;  Service: Podiatry;  Laterality: Left;  great toe       Review of patient's allergies indicates:   Allergen Reactions    Protamine Hives     Urticaria, possible upper airway swelling       No current facility-administered medications on file prior to encounter.      Current Outpatient Medications on File Prior to Encounter   Medication Sig    allopurinoL (ZYLOPRIM) 100 MG tablet Take 1 tablet (100 mg total) by mouth once daily.    atorvastatin (LIPITOR) 40 MG tablet TAKE 1 TABLET EVERY EVENING    azaTHIOprine (IMURAN) 50 mg Tab Take 1 tablet (50 mg total) by mouth once daily.    baclofen (LIORESAL) 10 MG tablet Take 1 tablet (10 mg total) by mouth once daily.    colchicine (COLCRYS) 0.6 mg tablet Take 1 tablet (0.6 mg total) by mouth once daily.    diltiaZEM (CARDIZEM SR) 60 MG Cp12 Take 1 capsule (60 mg total) by mouth 2 (two) times daily.    doxycycline (VIBRA-TABS) 100 MG tablet Take  1 tablet (100 mg total) by mouth every 12 (twelve) hours. for 7 days    ELIQUIS 5 mg Tab Take 1 tablet (5 mg total) by mouth 2 (two) times daily.    furosemide (LASIX) 40 MG tablet TAKE 2 TABLETS EVERY MORNING  AND TAKE 1 TABLET EVERY EVENING    gabapentin (NEURONTIN) 800 MG tablet Take 1 tablet (800 mg total) by mouth 3 (three) times daily.    glimepiride (AMARYL) 2 MG tablet TAKE 1 TABLET (2 MG TOTAL) BY MOUTH BEFORE BREAKFAST.    insulin (LANTUS SOLOSTAR U-100 INSULIN) glargine 100 units/mL (3mL) SubQ pen INJECT 35 UNITS SUBCUTANEOUSLY EVERY EVENING    insulin aspart U-100 (NOVOLOG FLEXPEN U-100 INSULIN) 100 unit/mL (3 mL) InPn pen INJECT 10 UNITS SUBCUTANEOUSLY THREE TIMES DAILY WITH MEALS    ipratropium (ATROVENT) 0.02 % nebulizer solution USE 1 VIAL VIA NEBULIZER FOUR TIMES DAILY    isosorbide mononitrate (IMDUR) 60 MG 24 hr tablet Take 1 tablet (60 mg total) by mouth once daily.    metoprolol succinate (TOPROL-XL) 50 MG 24 hr tablet Take 1 tablet (50 mg total) by mouth once daily.    pantoprazole (PROTONIX) 40 MG tablet Take 1 tablet (40 mg total) by mouth once daily.    sertraline (ZOLOFT) 100 MG tablet Take 1 tablet (100 mg total) by mouth every evening.    ALCOHOL ANTISEPTIC PADS (ALCOHOL PREP PADS TOP)     aspirin (ECOTRIN) 81 MG EC tablet Take 1 tablet (81 mg total) by mouth once daily.    blood sugar diagnostic (TRUE METRIX GLUCOSE TEST STRIP) Strp Use with blood glucose meter kit to test blood sugar four times  daily    blood-glucose meter (TRUE METRIX AIR GLUCOSE METER) kit TEST FOUR TIMES DAILY BEFORE MEALS  AND EVERY NIGHT    diclofenac sodium (VOLTAREN) 1 % Gel Apply 2 g topically 4 (four) times daily.    fluticasone-salmeterol diskus inhaler 250-50 mcg Inhale 1 puff into the lungs 2 (two) times daily. Controller    food supplemt, lactose-reduced (ENSURE) Liqd Take 118 mLs by mouth 3 (three) times daily with meals.    HYDROcodone-acetaminophen (NORCO)  mg per tablet Take 1  "tablet by mouth every 6 (six) hours as needed.    inhalation spacing device Use as directed for inhalation.    insulin syringe-needle U-100 1 mL 31 gauge x 5/16 Syrg     ketorolac (TORADOL) 10 mg tablet Take 1 tablet (10 mg total) by mouth every 8 (eight) hours as needed for Pain.    lancets 32 gauge Misc 1 lancet by Misc.(Non-Drug; Combo Route) route 2 (two) times daily.    latanoprost 0.005 % ophthalmic solution INSTILL 1 DROP INTO BOTH EYES EVERY EVENING    lisinopril 10 MG tablet Take 1 tablet (10 mg total) by mouth once daily.    metFORMIN (GLUCOPHAGE-XR) 500 MG XR 24hr tablet Take 2 tablets (1,000 mg total) by mouth 2 (two) times daily with meals.    methylPREDNISolone (MEDROL DOSEPACK) 4 mg tablet Take as directed on the package.    nitroGLYCERIN (NITROSTAT) 0.3 MG SL tablet PLACE 1 TABLET UNDER THE TONGUE EVERY 5 MINUTES AS NEEDED FOR CHEST PAIN, NO MORE THAN 3 TABLETS IN ONE DAY    OZEMPIC 1 mg/dose (2 mg/1.5 mL) PnIj     pen needle, diabetic (BD ULTRA-FINE SHORT PEN NEEDLE) 31 gauge x 5/16" Ndle Inject 1 each into the skin 3 (three) times daily.    predniSONE (DELTASONE) 5 MG tablet Take 1 tablet (5 mg total) by mouth 2 (two) times daily.    sertraline (ZOLOFT) 50 MG tablet TAKE 1 TABLET ONE TIME DAILY    silver sulfADIAZINE 1% (SILVADENE) 1 % cream Apply topically 2 (two) times daily.    tamsulosin (FLOMAX) 0.4 mg Cap Take 1 capsule (0.4 mg total) by mouth once daily.    tiotropium (SPIRIVA WITH HANDIHALER) 18 mcg inhalation capsule Inhale 1 capsule (18 mcg total) into the lungs once daily. Controller    traZODone (DESYREL) 100 MG tablet Take 2 tablets (200 mg total) by mouth every evening.    [employer] AEROSOL DELIVERY SYSTEM Shefali USE AS DIRESTED     Family History     Problem Relation (Age of Onset)    Cataracts Mother, Father, Sister    Glaucoma Mother, Sister, Maternal Aunt    No Known Problems Brother, Daughter, Son        Tobacco Use    Smoking status: Never Smoker    Smokeless " tobacco: Never Used   Substance and Sexual Activity    Alcohol use: No     Comment: occ    Drug use: No    Sexual activity: Yes     Partners: Female     Review of Systems   Constitutional: Negative for appetite change, chills, diaphoresis, fatigue and fever.   HENT: Negative for congestion, ear pain, mouth sores, sore throat and trouble swallowing.    Eyes: Negative for pain and visual disturbance.   Respiratory: Negative for cough, chest tightness and shortness of breath.    Cardiovascular: Negative for chest pain, palpitations and leg swelling.   Gastrointestinal: Negative for abdominal pain, constipation, diarrhea and nausea.   Endocrine: Negative for cold intolerance, heat intolerance, polydipsia and polyuria.   Genitourinary: Negative for dysuria, frequency and hematuria.   Musculoskeletal: Negative for arthralgias, back pain, myalgias and neck pain.   Skin: Negative for pallor, rash and wound.   Allergic/Immunologic: Negative for environmental allergies and immunocompromised state.   Neurological: Positive for dizziness. Negative for seizures, syncope, weakness, numbness and headaches.   Hematological: Negative for adenopathy. Does not bruise/bleed easily.   Psychiatric/Behavioral: Negative for agitation, confusion and sleep disturbance.     Objective:     Vital Signs (Most Recent):  Temp: 98 °F (36.7 °C) (07/05/20 2124)  Pulse: 80 (07/05/20 2124)  Resp: 18 (07/05/20 2124)  BP: 117/63 (07/05/20 2124)  SpO2: 100 % (07/05/20 2124) Vital Signs (24h Range):  Temp:  [98 °F (36.7 °C)-98.3 °F (36.8 °C)] 98 °F (36.7 °C)  Pulse:  [79-88] 80  Resp:  [18-31] 18  SpO2:  [82 %-100 %] 100 %  BP: ()/(51-73) 117/63     Weight: 91.9 kg (202 lb 8 oz)  Body mass index is 33.7 kg/m².    Physical Exam  Vitals signs and nursing note reviewed.   Constitutional:       Appearance: He is well-developed.   HENT:      Head: Normocephalic and atraumatic.   Eyes:      Conjunctiva/sclera: Conjunctivae normal.   Neck:       Musculoskeletal: Neck supple.      Vascular: No JVD.   Cardiovascular:      Rate and Rhythm: Normal rate and regular rhythm.      Heart sounds: Normal heart sounds.   Pulmonary:      Effort: Pulmonary effort is normal.      Breath sounds: Normal breath sounds. No wheezing.   Abdominal:      General: Bowel sounds are normal. There is no distension.      Palpations: Abdomen is soft.      Tenderness: There is no abdominal tenderness.   Musculoskeletal: Normal range of motion.   Skin:     General: Skin is warm and dry.      Findings: No rash.      Comments: Left great toe amputation site with sutures and packing in tact. Mild erythema surrounding site. No drainage or induration noted.    Neurological:      Mental Status: He is alert and oriented to person, place, and time.   Psychiatric:         Behavior: Behavior normal.         Thought Content: Thought content normal.                  Significant Labs:   CBC:   Recent Labs   Lab 07/05/20  1522   WBC 16.58*   HGB 9.4*   HCT 32.1*   *     CMP:   Recent Labs   Lab 07/05/20  1522      K 4.2      CO2 20*   *   BUN 20   CREATININE 3.6*   CALCIUM 9.2   PROT 8.3   ALBUMIN 2.7*   BILITOT 0.2   ALKPHOS 67   AST 17   ALT 23   ANIONGAP 12   EGFRNONAA 17*     Troponin:   Recent Labs   Lab 07/05/20  1522 07/05/20 2003   TROPONINI 0.099* 0.110*     All pertinent labs within the past 24 hours have been reviewed.    Significant Imaging: I have reviewed all pertinent imaging results/findings within the past 24 hours.    Assessment/Plan:     * Hypotension  -Most likely orthostatic due to poor intake associated with medication side effect.   -IV fluids.   -Hold antihypertensive medications.   -Monitor orthostatic vital signs.         JOSE MARIA (acute kidney injury)  -IV fluids.   -Monitor.       Diabetic ulcer of toe of left foot associated with type 2 diabetes mellitus, with necrosis of muscle  -Status post amputation of left great toe on 6/29/20 with closure on  7/1/20.   -Wound appears mildly cellulitic.  -IV antibiotics.    -Podiatry consult.       Elevated troponin  -Likely due to JOSE MARIA.   -Trend troponin.   -Continue ASA and statin.       Dermatomyositis  -Hold Imuran due to active infection.   -Continue prednisone.       PAF (paroxysmal atrial fibrillation)  -Currently in NSR.   -Continue metoprolol with blood pressure parameters.   -Hold Cardizem due to hypotension.   -Continue Eliqius.       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   -ADA diet.   -Hemoglobin A1C was 7.7 on 5/19/20.       Chronic combined systolic and diastolic HFrEF of 45%  -Appears compensated.   -Will use caution while giving IV fluids and monitor volume status.   -Continue metoprolol with blood pressure parameters.     Hypertension associated with diabetes  Hold antihypertensives due to orthostatic hypotension.       SANDRITA on CPAP  CPAP QHS.       VTE Risk Mitigation (From admission, onward)         Ordered     apixaban tablet 5 mg  2 times daily      07/05/20 3972                   JEWELS Jimenez  Department of Hospital Medicine   [facility]

## 2020-07-06 NOTE — ASSESSMENT & PLAN NOTE
-Likely due to JOSE MARIA- improved   -Trend troponin- downward trend   -Continue ASA and statin.

## 2020-07-06 NOTE — NURSING
Chart reviewed for diabetes  patient has been seen by this nurse on previous admit  No further action

## 2020-07-06 NOTE — ASSESSMENT & PLAN NOTE
-Appears compensated.   -gentle IV fluids   -monitor for volume overload  -Continue metoprolol with blood pressure parameters.    06-Apr-2017 23:13

## 2020-07-06 NOTE — ASSESSMENT & PLAN NOTE
-Status post amputation of left great toe on 6/29/20 with closure on 7/1/20.   -Wound appears mildly cellulitic.  -IV antibiotics.    -Podiatry consult.   -blood cultures with NGTD  -wound cultures grew BACTEROIDES OVATUS, MRSA, and HAFNIA ALVEI  -leukocytosis noted in the setting of recent infection and steroid use

## 2020-07-06 NOTE — HPI
Crow Green is a 65 year old male with DM II, CHF, atrial fibrillation and hypertension who presented to the ED with complaints of dizziness and falls at home. He states that symptoms began two days prior to presentation and typically occur when his moving from a sitting to a standing position. He also admits to nausea and poor intake since being discharged from the hospital on 7/3/20. He believes these symptoms are due to the doxycycline and Norco he is taking. The patient was hospitalized for an infected left great toe and underwent initial amputation on 6/29/20 with wound closure on 7/1/20. He is unsure if he is losing consciousness when he falls, but reports difficulty getting up. He denies fever, cough and shortness of breath. In the ED today, his creatinine was 3.6 which is elevated from 0.9 on 7/3/20. Other labs were significant for a WBC count of 16,580, albumin of 2.7, initial troponin of 0.099, initial lactic acid of 3.3 with a normal repeat and elevated specific gravity in his urine. Code status was discussed with the patient. He is a full code. His aunt, Awilda Moreno, is his surrogate medical decision maker.

## 2020-07-06 NOTE — ASSESSMENT & PLAN NOTE
-BP stable   -Most likely orthostatic due to poor intake associated with medication side effect.   -IV fluids.   -Hold antihypertensive medications.   -Monitor orthostatic vital signs.

## 2020-07-06 NOTE — ASSESSMENT & PLAN NOTE
-Appears compensated.   -Will use caution while giving IV fluids and monitor volume status.   -Continue metoprolol with blood pressure parameters.

## 2020-07-06 NOTE — ASSESSMENT & PLAN NOTE
-Currently in NSR.   -Continue metoprolol with blood pressure parameters.   -Hold Cardizem due to hypotension.   -Continue Eliqius.

## 2020-07-06 NOTE — ASSESSMENT & PLAN NOTE
-Most likely orthostatic due to poor intake associated with medication side effect.   -IV fluids.   -Hold antihypertensive medications.   -Monitor orthostatic vital signs.

## 2020-07-06 NOTE — SUBJECTIVE & OBJECTIVE
Interval History: pt stable upon exam and reports pain to lower extremity.  IV antibiotics and hydration continued.  Leukocytosis and JOSE MARIA improved.  Troponin trending down.  Vital sign stable with hypotension resolved.  Blood cultures with no growth to date.  Increased oral intake encouragaed.      Review of Systems   Constitutional: Negative for appetite change, chills, diaphoresis, fatigue and fever.   HENT: Negative for congestion, ear pain, mouth sores, sore throat and trouble swallowing.    Eyes: Negative for pain and visual disturbance.   Respiratory: Negative for cough, chest tightness and shortness of breath.    Cardiovascular: Negative for chest pain, palpitations and leg swelling.   Gastrointestinal: Negative for abdominal pain, constipation, diarrhea and nausea.   Endocrine: Negative for cold intolerance, heat intolerance, polydipsia and polyuria.   Genitourinary: Negative for dysuria, frequency and hematuria.   Musculoskeletal: Positive for myalgias. Negative for arthralgias, back pain and neck pain.   Skin: Positive for wound. Negative for pallor and rash.   Allergic/Immunologic: Negative for environmental allergies and immunocompromised state.   Neurological: Positive for dizziness. Negative for seizures, syncope, weakness, numbness and headaches.   Hematological: Negative for adenopathy. Does not bruise/bleed easily.   Psychiatric/Behavioral: Negative for agitation, confusion and sleep disturbance.     Objective:     Vital Signs (Most Recent):  Temp: 97.7 °F (36.5 °C) (07/06/20 1249)  Pulse: 89 (07/06/20 1249)  Resp: 18 (07/06/20 1249)  BP: 138/70 (07/06/20 1249)  SpO2: 97 % (07/06/20 1249) Vital Signs (24h Range):  Temp:  [96.2 °F (35.7 °C)-98.7 °F (37.1 °C)] 97.7 °F (36.5 °C)  Pulse:  [79-89] 89  Resp:  [16-31] 18  SpO2:  [82 %-100 %] 97 %  BP: ()/(51-75) 138/70     Weight: 91.9 kg (202 lb 8 oz)  Body mass index is 33.7 kg/m².    Intake/Output Summary (Last 24 hours) at 7/6/2020 1439  Last  data filed at 7/6/2020 1000  Gross per 24 hour   Intake 4722 ml   Output 900 ml   Net 3822 ml      Physical Exam  Vitals signs and nursing note reviewed.   Constitutional:       Appearance: He is well-developed.   HENT:      Head: Normocephalic and atraumatic.   Eyes:      Conjunctiva/sclera: Conjunctivae normal.   Neck:      Musculoskeletal: Neck supple.      Vascular: No JVD.   Cardiovascular:      Rate and Rhythm: Normal rate and regular rhythm.      Heart sounds: Normal heart sounds.   Pulmonary:      Effort: Pulmonary effort is normal.      Breath sounds: Normal breath sounds. No wheezing.   Abdominal:      General: Bowel sounds are normal. There is no distension.      Palpations: Abdomen is soft.      Tenderness: There is no abdominal tenderness.   Musculoskeletal: Normal range of motion.   Skin:     General: Skin is warm and dry.      Findings: No rash.      Comments: Left great toe amputation site with sutures and packing in tact. Mild erythema surrounding site. No drainage or induration noted.    Neurological:      Mental Status: He is alert and oriented to person, place, and time.   Psychiatric:         Behavior: Behavior normal.         Thought Content: Thought content normal.         Significant Labs:   CBC:   Recent Labs   Lab 07/05/20 1522 07/06/20  0334   WBC 16.58* 16.54*   HGB 9.4* 8.3*   HCT 32.1* 27.7*   * 312     CMP:   Recent Labs   Lab 07/05/20 1522 07/06/20  0334    139   K 4.2 4.8    109   CO2 20* 22*   * 93   BUN 20 22   CREATININE 3.6* 2.3*   CALCIUM 9.2 8.2*   PROT 8.3  --    ALBUMIN 2.7*  --    BILITOT 0.2  --    ALKPHOS 67  --    AST 17  --    ALT 23  --    ANIONGAP 12 8   EGFRNONAA 17* 29*     Lactic Acid:   Recent Labs   Lab 07/05/20  1532 07/05/20  1900   LACTATE 3.3* 1.4     Troponin:   Recent Labs   Lab 07/05/20  1522 07/05/20 2003 07/06/20  0334   TROPONINI 0.099* 0.110* 0.077*       Significant Imaging:   Imaging Results          X-Ray Chest AP  Portable (Final result)  Result time 07/05/20 15:51:20    Final result by Jayjay De La Rosa MD (07/05/20 15:51:20)                 Impression:      No acute abnormality.      Electronically signed by: Jayjay De La Rosa  Date:    07/05/2020  Time:    15:51             Narrative:    EXAMINATION:  XR CHEST AP PORTABLE    CLINICAL HISTORY:  Sepsis;.    TECHNIQUE:  Single frontal portable view of the chest was performed.    COMPARISON:  None    FINDINGS:  Support devices: None    The lungs are clear, with normal appearance of pulmonary vasculature and no pleural effusion or pneumothorax.    The cardiac silhouette is normal in size. The hilar and mediastinal contours are unremarkable.    Bones are intact.

## 2020-07-06 NOTE — ED NOTES
While pt was sleeping, I observed pt having apneic episodes to where his sats dropped to the low 80s. I placed pt on 3L nasal canula and elevated HOB as a precaution and his sats came back up to 95%.

## 2020-07-06 NOTE — PROGRESS NOTES
[facility] Hospital Medicine  Progress Note    Patient Name: Crow Green  MRN: [id number]  Patient Class: IP- Inpatient   Admission Date: 7/5/2020  Length of Stay: 0 days  Attending Physician: Davina Donovan MD  Primary Care Provider: Mateo Lambert DO        Subjective:     Principal Problem:Hypotension        HPI:  Crow Green is a 65 year old male with DM II, CHF, atrial fibrillation and hypertension who presented to the ED with complaints of dizziness and falls at home. He states that symptoms began two days prior to presentation and typically occur when his moving from a sitting to a standing position. He also admits to nausea and poor intake since being discharged from the hospital on 7/3/20. He believes these symptoms are due to the doxycycline and Norco he is taking. The patient was hospitalized for an infected left great toe and underwent initial amputation on 6/29/20 with wound closure on 7/1/20. He is unsure if he is losing consciousness when he falls, but reports difficulty getting up. He denies fever, cough and shortness of breath. In the ED today, his creatinine was 3.6 which is elevated from 0.9 on 7/3/20. Other labs were significant for a WBC count of 16,580, albumin of 2.7, initial troponin of 0.099, initial lactic acid of 3.3 with a normal repeat and elevated specific gravity in his urine. Code status was discussed with the patient. He is a full code. His aunt, Awilda Moreno, is his surrogate medical decision maker.     Overview/Hospital Course:  Pt admitted to Observation Unit for Hypotension. Recent infected left great toe with initial amputation on 6/29/20 with wound closure on 7/1/20 of note.  JOSE MARIA noted.  Pt treated with IV hydration and IV antibiotics initiated.  Lactic acid normalized.  Leukocytosis noted.  Blood cultures with no growth to date.  H/H stable with troponin trending down.  Previous aerobic wound culture grew MRSA and HAFNIA ALVEI with anaerobic  culture growing BACTEROIDES OVATUS.  Pt verbalized symptom improvement with current plan of care continued.      Interval History: pt stable upon exam and reports pain to lower extremity.  IV antibiotics and hydration continued.  Leukocytosis and JOSE MARIA improved.  Troponin trending down.  Vital sign stable with hypotension resolved.  Blood cultures with no growth to date.  Increased oral intake encouragaed.      Review of Systems   Constitutional: Negative for appetite change, chills, diaphoresis, fatigue and fever.   HENT: Negative for congestion, ear pain, mouth sores, sore throat and trouble swallowing.    Eyes: Negative for pain and visual disturbance.   Respiratory: Negative for cough, chest tightness and shortness of breath.    Cardiovascular: Negative for chest pain, palpitations and leg swelling.   Gastrointestinal: Negative for abdominal pain, constipation, diarrhea and nausea.   Endocrine: Negative for cold intolerance, heat intolerance, polydipsia and polyuria.   Genitourinary: Negative for dysuria, frequency and hematuria.   Musculoskeletal: Positive for myalgias. Negative for arthralgias, back pain and neck pain.   Skin: Positive for wound. Negative for pallor and rash.   Allergic/Immunologic: Negative for environmental allergies and immunocompromised state.   Neurological: Positive for dizziness. Negative for seizures, syncope, weakness, numbness and headaches.   Hematological: Negative for adenopathy. Does not bruise/bleed easily.   Psychiatric/Behavioral: Negative for agitation, confusion and sleep disturbance.     Objective:     Vital Signs (Most Recent):  Temp: 97.7 °F (36.5 °C) (07/06/20 1249)  Pulse: 89 (07/06/20 1249)  Resp: 18 (07/06/20 1249)  BP: 138/70 (07/06/20 1249)  SpO2: 97 % (07/06/20 1249) Vital Signs (24h Range):  Temp:  [96.2 °F (35.7 °C)-98.7 °F (37.1 °C)] 97.7 °F (36.5 °C)  Pulse:  [79-89] 89  Resp:  [16-31] 18  SpO2:  [82 %-100 %] 97 %  BP: ()/(51-75) 138/70     Weight: 91.9 kg  (202 lb 8 oz)  Body mass index is 33.7 kg/m².    Intake/Output Summary (Last 24 hours) at 7/6/2020 1439  Last data filed at 7/6/2020 1000  Gross per 24 hour   Intake 4722 ml   Output 900 ml   Net 3822 ml      Physical Exam  Vitals signs and nursing note reviewed.   Constitutional:       Appearance: He is well-developed.   HENT:      Head: Normocephalic and atraumatic.   Eyes:      Conjunctiva/sclera: Conjunctivae normal.   Neck:      Musculoskeletal: Neck supple.      Vascular: No JVD.   Cardiovascular:      Rate and Rhythm: Normal rate and regular rhythm.      Heart sounds: Normal heart sounds.   Pulmonary:      Effort: Pulmonary effort is normal.      Breath sounds: Normal breath sounds. No wheezing.   Abdominal:      General: Bowel sounds are normal. There is no distension.      Palpations: Abdomen is soft.      Tenderness: There is no abdominal tenderness.   Musculoskeletal: Normal range of motion.   Skin:     General: Skin is warm and dry.      Findings: No rash.      Comments: Left great toe amputation site with sutures and packing in tact. Mild erythema surrounding site. No drainage or induration noted.    Neurological:      Mental Status: He is alert and oriented to person, place, and time.   Psychiatric:         Behavior: Behavior normal.         Thought Content: Thought content normal.         Significant Labs:   CBC:   Recent Labs   Lab 07/05/20  1522 07/06/20  0334   WBC 16.58* 16.54*   HGB 9.4* 8.3*   HCT 32.1* 27.7*   * 312     CMP:   Recent Labs   Lab 07/05/20  1522 07/06/20  0334    139   K 4.2 4.8    109   CO2 20* 22*   * 93   BUN 20 22   CREATININE 3.6* 2.3*   CALCIUM 9.2 8.2*   PROT 8.3  --    ALBUMIN 2.7*  --    BILITOT 0.2  --    ALKPHOS 67  --    AST 17  --    ALT 23  --    ANIONGAP 12 8   EGFRNONAA 17* 29*     Lactic Acid:   Recent Labs   Lab 07/05/20  1532 07/05/20  1900   LACTATE 3.3* 1.4     Troponin:   Recent Labs   Lab 07/05/20  1522 07/05/20 2003 07/06/20  0334    TROPONINI 0.099* 0.110* 0.077*       Significant Imaging:   Imaging Results          X-Ray Chest AP Portable (Final result)  Result time 07/05/20 15:51:20    Final result by Jayjay De La Rosa MD (07/05/20 15:51:20)                 Impression:      No acute abnormality.      Electronically signed by: Jayjay De La Rosa  Date:    07/05/2020  Time:    15:51             Narrative:    EXAMINATION:  XR CHEST AP PORTABLE    CLINICAL HISTORY:  Sepsis;.    TECHNIQUE:  Single frontal portable view of the chest was performed.    COMPARISON:  None    FINDINGS:  Support devices: None    The lungs are clear, with normal appearance of pulmonary vasculature and no pleural effusion or pneumothorax.    The cardiac silhouette is normal in size. The hilar and mediastinal contours are unremarkable.    Bones are intact.                                Assessment/Plan:      * Hypotension  -BP stable   -Most likely orthostatic due to poor intake associated with medication side effect.   -IV fluids.   -Hold antihypertensive medications.   -Monitor orthostatic vital signs.         Dermatomyositis  -Hold Imuran due to active infection.   -Continue prednisone.       Elevated troponin  -Likely due to JOSE MARIA- improved   -Trend troponin- downward trend   -Continue ASA and statin.       Diabetic ulcer of toe of left foot associated with type 2 diabetes mellitus, with necrosis of muscle  -Status post amputation of left great toe on 6/29/20 with closure on 7/1/20.   -Wound appears mildly cellulitic.  -IV antibiotics.    -Podiatry consult.   -blood cultures with NGTD  -wound cultures grew BACTEROIDES OVATUS, MRSA, and HAFNIA ALVEI  -leukocytosis noted in the setting of recent infection and steroid use       JOSE MARIA (acute kidney injury)  -improved to 2.3  -IV fluids.   -Monitor  -repeat CMP in am       PAF (paroxysmal atrial fibrillation)  -Currently in NSR.   -Continue metoprolol with blood pressure parameters.   -Hold Cardizem due to hypotension.   -Continue  Eliqius.       SANDRITA on CPAP  CPAP QHS.       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   -ADA diet.   -Hemoglobin A1C was 7.7 on 5/19/20.       Chronic combined systolic and diastolic HFrEF of 45%  -Appears compensated.   -gentle IV fluids   -monitor for volume overload  -Continue metoprolol with blood pressure parameters.     Hypertension associated with diabetes  -stable   -will resume home medications as appropriate   Hold antihypertensives due to orthostatic hypotension.         VTE Risk Mitigation (From admission, onward)         Ordered     apixaban tablet 5 mg  2 times daily      07/05/20 0645                      Annabelle Choudhary NP  Department of Hospital Medicine   [facility]

## 2020-07-06 NOTE — PLAN OF CARE
Jul7 Follow up with Elías Lemus MD  Tuesday Jul 7, 2020  at 8:00 am [address]   [address] [facility]   [place] [address]  [phone]   Jul8 Established Patient Visit with Barb Veras DPM  Wednesday Jul 8, 2020 10:00 AM  Arrive at check-in approximately 15 minutes before your scheduled appointment time. Bring all outside medical records and imaging, along with a list of your current medications and insurance card.  Prepay due: Estimate unavailable  1st Floor - From [address], take the [facility] exit ([address]). Enter the facility from the [address] [facility]  [address] [facility]   [place] [address]  [phone]        07/06/20 0717   Final Note   Assessment Type Final Discharge Note   Anticipated Discharge Disposition Home-Health   Hospital Follow Up  Appt(s) scheduled? Yes   Right Care Referral Info   Post Acute Recommendation Home-care   Facility Name [facility]

## 2020-07-06 NOTE — PROGRESS NOTES
"  [facility]   Adult Nutrition  Progress Note    SUMMARY     Recommendations    Recommendation:   1. Continue diabetic cardiac diet   2. Add Arginaid supplement 1x day.   3. RD to f/u  Goals: Consistent >75% meal intake by RD f/u  Nutrition Goal Status: new  Communication of RD Recs: (POC)    Reason for Assessment     Reason For Assessment: identified at risk by screening criteria  Diagnosis: infection/sepsis  Relevant Medical History: Type 2 DM, HTN, CHF    General Information Comments:   7/6/20 Pt had a toe amputation on 6/29, closed 7/1. He was discharged on the 3rd but returned on the 5th with poor oral intake and dizziness. He is on a diabetic cardiac 2000kcal diet, 100% meal intake reported. He has JOSE MARIA- improving overnight.   Nutrition Discharge Planning: diabetic diet    Nutrition Risk Screen    Nutrition Risk Screen: large or nonhealing wound, burn or pressure injury    Nutrition/Diet History    Food Allergies: NKFA  Factors Affecting Nutritional Intake: decreased appetite    Anthropometrics    Temp: 97.7 °F (36.5 °C)  Height Method: Stated  Height: 5' 5" (165.1 cm)  Height (inches): 65 in  Weight Method: Standard Scale  Weight: 91.9 kg (202 lb 8 oz)  Weight (lb): 202.5 lb  Ideal Body Weight (IBW), Male: 136 lb  % Ideal Body Weight, Male (lb): 148.9 %  BMI (Calculated): 33.7  BMI Grade: 30 - 34.9- obesity - grade I       Lab/Procedures/Meds  Pertinent Labs: reviewed  Albumin 2.7, EGFR 34, Cr 2.3  Pertinent Medications: reviewed  Prednisone, Pantoprazole, Zofran, NaCl   Physical Findings/Assessment     Amputated toe    Estimated/Assessed Needs    Weight Used For Calorie Calculations: 91.9 kg (202 lb 9.6 oz)  Energy Calorie Requirements (kcal): 1650  Energy Need Method: [place]-St Jeor  Protein Requirements: 110- 140g(1.2-1.5g/kg per wound)  Weight Used For Protein Calculations: 91.9 kg (202 lb 9.6 oz)     Estimated Fluid Requirement Method: RDA Method(or per MD)  RDA Method (mL): 1650   "   Nutrition Prescription Ordered    Current Diet Order: Diabetic cardiac 2000kcal    Evaluation of Received Nutrient/Fluid Intake       Intake/Output Summary (Last 24 hours) at 7/6/2020 1334  Last data filed at 7/6/2020 1000  Gross per 24 hour   Intake 4722 ml   Output 900 ml   Net 3822 ml     % Intake of Estimated Energy Needs: 75 - 100 %  % Meal Intake: 75 - 100 %    Nutrition Risk    1x week     Assessment and Plan    Nutrition Problem  Increased Nutrition Needs- Protein    Related to (etiology):   Increased demand for nutrient- wound healing, infection    Signs and Symptoms (as evidenced by):   delayed wound healing- recent toe amputation    Interventions:  Medical nutrition supplement- arginaid    Nutrition Diagnosis Status:   New    Monitor and Evaluation    Food and Nutrient Intake: food and beverage intake  Food and Nutrient Adminstration: diet order  Anthropometric Measurements: weight  Biochemical Data, Medical Tests and Procedures: electrolyte and renal panel, glucose/endocrine profile  Nutrition-Focused Physical Findings: skin     Malnutrition Assessment         Due to recent hospital wide restrictions to limit the transfer of (COVID-19), we are not performing any physical exams at this time. All S/S will be observational; NFPE to be performed at a future date. No weight loss, per Epic records.   Nutrition Follow-Up    RD Follow-up?: Yes    Thanks!  Ana Haile MPH RD LDN

## 2020-07-06 NOTE — HOSPITAL COURSE
Pt admitted to Observation Unit for Hypotension. Recent infected left great toe with initial amputation on 6/29/20 with wound closure on 7/1/20 of note.  JOSE MARIA noted.  Pt treated with IV hydration and IV antibiotics initiated.  Lactic acid normalized.  Leukocytosis noted.  Blood cultures with no growth to date.  H/H stable with troponin trending down.  Previous aerobic wound culture grew MRSA and HAFNIA ALVEI with anaerobic culture growing BACTEROIDES OVATUS.  Pt verbalized symptom improvement with current plan of care continued.  On 7/7/2020, leukocytosis resolved on current therapy.  H/H stable and creatinine normalized.  Vital signs stable.  PT/OT evaluation ordered.  Blood cultures with no growth to date.  Wound cultures and sensitivities reviewed. On 7/8/2020, PT/OT evaluation completed.  Pt verbalized symptom improvement and vital signs stable.  Pt counseled on maintining compliance with prescribed medications, dietary restrictions, and outpatient follow up with understanding verbalized.  Pt seen and examined on the date of discharge and deemed suitable for discharge to home.  CM assisted with discharge planning and home health established prior to discharge.  Current medications resumed with MagOx and Augmentin prescribed.  Pt instructed to follow up with PCP and Podiatry upon discharge for further evaluation.

## 2020-07-06 NOTE — PROGRESS NOTES
Clinical Pharmacy: Vancomycin Consult -- Signoff Note    Therapy with vancomycin complete and/or consult discontinued by provider.  Pharmacy will sign off, please re-consult as needed.    Thank you for allowing us to participate in this patient's care.   Katherine McArdle, Pharm.D. 7/6/2020 10:34 AM

## 2020-07-06 NOTE — SUBJECTIVE & OBJECTIVE
Past Medical History:   Diagnosis Date    Arthritis     Asthma     Atrial fibrillation     Atrial fibrillation with RVR 8/7/2019    CHF (congestive heart failure)     Depression     Diabetes mellitus, type 2 Diagnosed in 2000    Elevated PSA     Hypertension     Sleep apnea        Past Surgical History:   Procedure Laterality Date    ABLATION OF ARRHYTHMOGENIC FOCUS FOR ATRIAL FIBRILLATION N/A 2/27/2020    Procedure: Ablation atrial fibrillation;  Surgeon: Gio Brown MD;  Location: [facility] EP LAB;  Service: Cardiology;  Laterality: N/A;  afib, DAMIEN (cx if SR), PVI, PITO, anes, MB, 3 Prep    CHOLECYSTECTOMY      CLOSURE OF WOUND Left 7/1/2020    Procedure: CLOSURE, WOUND;  Surgeon: Barb Veras DPM;  Location: [facility] OR;  Service: Podiatry;  Laterality: Left;    SELECTIVE INJECTION OF ANESTHETIC AGENT AROUND LUMBAR SPINAL NERVE ROOT BY TRANSFORAMINAL APPROACH Left 6/11/2020    Procedure: BLOCK, SPINAL NERVE ROOT, LUMBAR, SELECTIVE, TRANSFORAMINAL APPROACH Left L4-5, L5-S1 TFESI with RN IV sedation;  Surgeon: Dillan Tsai MD;  Location: [facility] PAIN MGT;  Service: Pain Management;  Laterality: Left;    TOE AMPUTATION Left 6/29/2020    Procedure: AMPUTATION, TOE;  Surgeon: Barb Veras DPM;  Location: [facility] OR;  Service: Podiatry;  Laterality: Left;  great toe       Review of patient's allergies indicates:   Allergen Reactions    Protamine Hives     Urticaria, possible upper airway swelling       No current facility-administered medications on file prior to encounter.      Current Outpatient Medications on File Prior to Encounter   Medication Sig    allopurinoL (ZYLOPRIM) 100 MG tablet Take 1 tablet (100 mg total) by mouth once daily.    atorvastatin (LIPITOR) 40 MG tablet TAKE 1 TABLET EVERY EVENING    azaTHIOprine (IMURAN) 50 mg Tab Take 1 tablet (50 mg total) by mouth once daily.    baclofen (LIORESAL) 10 MG tablet Take 1 tablet (10 mg total) by mouth once daily.    colchicine (COLCRYS)  0.6 mg tablet Take 1 tablet (0.6 mg total) by mouth once daily.    diltiaZEM (CARDIZEM SR) 60 MG Cp12 Take 1 capsule (60 mg total) by mouth 2 (two) times daily.    doxycycline (VIBRA-TABS) 100 MG tablet Take 1 tablet (100 mg total) by mouth every 12 (twelve) hours. for 7 days    ELIQUIS 5 mg Tab Take 1 tablet (5 mg total) by mouth 2 (two) times daily.    furosemide (LASIX) 40 MG tablet TAKE 2 TABLETS EVERY MORNING  AND TAKE 1 TABLET EVERY EVENING    gabapentin (NEURONTIN) 800 MG tablet Take 1 tablet (800 mg total) by mouth 3 (three) times daily.    glimepiride (AMARYL) 2 MG tablet TAKE 1 TABLET (2 MG TOTAL) BY MOUTH BEFORE BREAKFAST.    insulin (LANTUS SOLOSTAR U-100 INSULIN) glargine 100 units/mL (3mL) SubQ pen INJECT 35 UNITS SUBCUTANEOUSLY EVERY EVENING    insulin aspart U-100 (NOVOLOG FLEXPEN U-100 INSULIN) 100 unit/mL (3 mL) InPn pen INJECT 10 UNITS SUBCUTANEOUSLY THREE TIMES DAILY WITH MEALS    ipratropium (ATROVENT) 0.02 % nebulizer solution USE 1 VIAL VIA NEBULIZER FOUR TIMES DAILY    isosorbide mononitrate (IMDUR) 60 MG 24 hr tablet Take 1 tablet (60 mg total) by mouth once daily.    metoprolol succinate (TOPROL-XL) 50 MG 24 hr tablet Take 1 tablet (50 mg total) by mouth once daily.    pantoprazole (PROTONIX) 40 MG tablet Take 1 tablet (40 mg total) by mouth once daily.    sertraline (ZOLOFT) 100 MG tablet Take 1 tablet (100 mg total) by mouth every evening.    ALCOHOL ANTISEPTIC PADS (ALCOHOL PREP PADS TOP)     aspirin (ECOTRIN) 81 MG EC tablet Take 1 tablet (81 mg total) by mouth once daily.    blood sugar diagnostic (TRUE METRIX GLUCOSE TEST STRIP) Strp Use with blood glucose meter kit to test blood sugar four times  daily    blood-glucose meter (TRUE METRIX AIR GLUCOSE METER) kit TEST FOUR TIMES DAILY BEFORE MEALS  AND EVERY NIGHT    diclofenac sodium (VOLTAREN) 1 % Gel Apply 2 g topically 4 (four) times daily.    fluticasone-salmeterol diskus inhaler 250-50 mcg Inhale 1 puff into the  "lungs 2 (two) times daily. Controller    food supplemt, lactose-reduced (ENSURE) Liqd Take 118 mLs by mouth 3 (three) times daily with meals.    HYDROcodone-acetaminophen (NORCO)  mg per tablet Take 1 tablet by mouth every 6 (six) hours as needed.    inhalation spacing device Use as directed for inhalation.    insulin syringe-needle U-100 1 mL 31 gauge x 5/16 Syrg     ketorolac (TORADOL) 10 mg tablet Take 1 tablet (10 mg total) by mouth every 8 (eight) hours as needed for Pain.    lancets 32 gauge Misc 1 lancet by Misc.(Non-Drug; Combo Route) route 2 (two) times daily.    latanoprost 0.005 % ophthalmic solution INSTILL 1 DROP INTO BOTH EYES EVERY EVENING    lisinopril 10 MG tablet Take 1 tablet (10 mg total) by mouth once daily.    metFORMIN (GLUCOPHAGE-XR) 500 MG XR 24hr tablet Take 2 tablets (1,000 mg total) by mouth 2 (two) times daily with meals.    methylPREDNISolone (MEDROL DOSEPACK) 4 mg tablet Take as directed on the package.    nitroGLYCERIN (NITROSTAT) 0.3 MG SL tablet PLACE 1 TABLET UNDER THE TONGUE EVERY 5 MINUTES AS NEEDED FOR CHEST PAIN, NO MORE THAN 3 TABLETS IN ONE DAY    OZEMPIC 1 mg/dose (2 mg/1.5 mL) PnIj     pen needle, diabetic (BD ULTRA-FINE SHORT PEN NEEDLE) 31 gauge x 5/16" Ndle Inject 1 each into the skin 3 (three) times daily.    predniSONE (DELTASONE) 5 MG tablet Take 1 tablet (5 mg total) by mouth 2 (two) times daily.    sertraline (ZOLOFT) 50 MG tablet TAKE 1 TABLET ONE TIME DAILY    silver sulfADIAZINE 1% (SILVADENE) 1 % cream Apply topically 2 (two) times daily.    tamsulosin (FLOMAX) 0.4 mg Cap Take 1 capsule (0.4 mg total) by mouth once daily.    tiotropium (SPIRIVA WITH HANDIHALER) 18 mcg inhalation capsule Inhale 1 capsule (18 mcg total) into the lungs once daily. Controller    traZODone (DESYREL) 100 MG tablet Take 2 tablets (200 mg total) by mouth every evening.    BeCouplyOS AEROSOL DELIVERY SYSTEM Shefali USE AS DIRESTED     Family History     Problem Relation " (Age of Onset)    Cataracts Mother, Father, Sister    Glaucoma Mother, Sister, Maternal Aunt    No Known Problems Brother, Daughter, Son        Tobacco Use    Smoking status: Never Smoker    Smokeless tobacco: Never Used   Substance and Sexual Activity    Alcohol use: No     Comment: occ    Drug use: No    Sexual activity: Yes     Partners: Female     Review of Systems   Constitutional: Negative for appetite change, chills, diaphoresis, fatigue and fever.   HENT: Negative for congestion, ear pain, mouth sores, sore throat and trouble swallowing.    Eyes: Negative for pain and visual disturbance.   Respiratory: Negative for cough, chest tightness and shortness of breath.    Cardiovascular: Negative for chest pain, palpitations and leg swelling.   Gastrointestinal: Negative for abdominal pain, constipation, diarrhea and nausea.   Endocrine: Negative for cold intolerance, heat intolerance, polydipsia and polyuria.   Genitourinary: Negative for dysuria, frequency and hematuria.   Musculoskeletal: Negative for arthralgias, back pain, myalgias and neck pain.   Skin: Negative for pallor, rash and wound.   Allergic/Immunologic: Negative for environmental allergies and immunocompromised state.   Neurological: Positive for dizziness. Negative for seizures, syncope, weakness, numbness and headaches.   Hematological: Negative for adenopathy. Does not bruise/bleed easily.   Psychiatric/Behavioral: Negative for agitation, confusion and sleep disturbance.     Objective:     Vital Signs (Most Recent):  Temp: 98 °F (36.7 °C) (07/05/20 2124)  Pulse: 80 (07/05/20 2124)  Resp: 18 (07/05/20 2124)  BP: 117/63 (07/05/20 2124)  SpO2: 100 % (07/05/20 2124) Vital Signs (24h Range):  Temp:  [98 °F (36.7 °C)-98.3 °F (36.8 °C)] 98 °F (36.7 °C)  Pulse:  [79-88] 80  Resp:  [18-31] 18  SpO2:  [82 %-100 %] 100 %  BP: ()/(51-73) 117/63     Weight: 91.9 kg (202 lb 8 oz)  Body mass index is 33.7 kg/m².    Physical Exam  Vitals signs and  nursing note reviewed.   Constitutional:       Appearance: He is well-developed.   HENT:      Head: Normocephalic and atraumatic.   Eyes:      Conjunctiva/sclera: Conjunctivae normal.   Neck:      Musculoskeletal: Neck supple.      Vascular: No JVD.   Cardiovascular:      Rate and Rhythm: Normal rate and regular rhythm.      Heart sounds: Normal heart sounds.   Pulmonary:      Effort: Pulmonary effort is normal.      Breath sounds: Normal breath sounds. No wheezing.   Abdominal:      General: Bowel sounds are normal. There is no distension.      Palpations: Abdomen is soft.      Tenderness: There is no abdominal tenderness.   Musculoskeletal: Normal range of motion.   Skin:     General: Skin is warm and dry.      Findings: No rash.      Comments: Left great toe amputation site with sutures and packing in tact. Mild erythema surrounding site. No drainage or induration noted.    Neurological:      Mental Status: He is alert and oriented to person, place, and time.   Psychiatric:         Behavior: Behavior normal.         Thought Content: Thought content normal.                  Significant Labs:   CBC:   Recent Labs   Lab 07/05/20  1522   WBC 16.58*   HGB 9.4*   HCT 32.1*   *     CMP:   Recent Labs   Lab 07/05/20  1522      K 4.2      CO2 20*   *   BUN 20   CREATININE 3.6*   CALCIUM 9.2   PROT 8.3   ALBUMIN 2.7*   BILITOT 0.2   ALKPHOS 67   AST 17   ALT 23   ANIONGAP 12   EGFRNONAA 17*     Troponin:   Recent Labs   Lab 07/05/20  1522 07/05/20 2003   TROPONINI 0.099* 0.110*     All pertinent labs within the past 24 hours have been reviewed.    Significant Imaging: I have reviewed all pertinent imaging results/findings within the past 24 hours.

## 2020-07-06 NOTE — PLAN OF CARE
Recommendations    Recommendation:   1. Continue diabetic cardiac diet   2. Add Arginaid supplement 1x day.   3. RD to f/u  Goals: Consistent >75% meal intake by RD f/u  Nutrition Goal Status: new  Communication of RD Recs: (POC)

## 2020-07-06 NOTE — ASSESSMENT & PLAN NOTE
-stable   -will resume home medications as appropriate   Hold antihypertensives due to orthostatic hypotension.

## 2020-07-07 LAB
ANION GAP SERPL CALC-SCNC: 10 MMOL/L (ref 8–16)
BASOPHILS # BLD AUTO: 0.02 K/UL (ref 0–0.2)
BASOPHILS NFR BLD: 0.1 % (ref 0–1.9)
BUN SERPL-MCNC: 13 MG/DL (ref 8–23)
CALCIUM SERPL-MCNC: 8.8 MG/DL (ref 8.7–10.5)
CHLORIDE SERPL-SCNC: 105 MMOL/L (ref 95–110)
CO2 SERPL-SCNC: 24 MMOL/L (ref 23–29)
CREAT SERPL-MCNC: 1.1 MG/DL (ref 0.5–1.4)
DIFFERENTIAL METHOD: ABNORMAL
EOSINOPHIL # BLD AUTO: 0.3 K/UL (ref 0–0.5)
EOSINOPHIL NFR BLD: 2.2 % (ref 0–8)
ERYTHROCYTE [DISTWIDTH] IN BLOOD BY AUTOMATED COUNT: 14.5 % (ref 11.5–14.5)
EST. GFR  (AFRICAN AMERICAN): >60 ML/MIN/1.73 M^2
EST. GFR  (NON AFRICAN AMERICAN): >60 ML/MIN/1.73 M^2
GLUCOSE SERPL-MCNC: 122 MG/DL (ref 70–110)
HCT VFR BLD AUTO: 29.2 % (ref 40–54)
HGB BLD-MCNC: 8.9 G/DL (ref 14–18)
IMM GRANULOCYTES # BLD AUTO: 0.1 K/UL (ref 0–0.04)
IMM GRANULOCYTES NFR BLD AUTO: 0.7 % (ref 0–0.5)
LYMPHOCYTES # BLD AUTO: 2 K/UL (ref 1–4.8)
LYMPHOCYTES NFR BLD: 14.9 % (ref 18–48)
MAGNESIUM SERPL-MCNC: 1.7 MG/DL (ref 1.6–2.6)
MCH RBC QN AUTO: 26.6 PG (ref 27–31)
MCHC RBC AUTO-ENTMCNC: 30.5 G/DL (ref 32–36)
MCV RBC AUTO: 87 FL (ref 82–98)
MONOCYTES # BLD AUTO: 0.9 K/UL (ref 0.3–1)
MONOCYTES NFR BLD: 6.8 % (ref 4–15)
NEUTROPHILS # BLD AUTO: 10.1 K/UL (ref 1.8–7.7)
NEUTROPHILS NFR BLD: 75.3 % (ref 38–73)
NRBC BLD-RTO: 0 /100 WBC
PHOSPHATE SERPL-MCNC: 2.6 MG/DL (ref 2.7–4.5)
PLATELET # BLD AUTO: 314 K/UL (ref 150–350)
PMV BLD AUTO: 9.4 FL (ref 9.2–12.9)
POCT GLUCOSE: 111 MG/DL (ref 70–110)
POCT GLUCOSE: 112 MG/DL (ref 70–110)
POCT GLUCOSE: 138 MG/DL (ref 70–110)
POCT GLUCOSE: 150 MG/DL (ref 70–110)
POTASSIUM SERPL-SCNC: 4.2 MMOL/L (ref 3.5–5.1)
RBC # BLD AUTO: 3.34 M/UL (ref 4.6–6.2)
SODIUM SERPL-SCNC: 139 MMOL/L (ref 136–145)
WBC # BLD AUTO: 13.44 K/UL (ref 3.9–12.7)

## 2020-07-07 PROCEDURE — 94761 N-INVAS EAR/PLS OXIMETRY MLT: CPT | Mod: HCNC

## 2020-07-07 PROCEDURE — 21400001 HC TELEMETRY ROOM: Mod: HCNC

## 2020-07-07 PROCEDURE — 25000242 PHARM REV CODE 250 ALT 637 W/ HCPCS: Mod: HCNC | Performed by: PHYSICIAN ASSISTANT

## 2020-07-07 PROCEDURE — 83735 ASSAY OF MAGNESIUM: CPT | Mod: HCNC

## 2020-07-07 PROCEDURE — 94640 AIRWAY INHALATION TREATMENT: CPT | Mod: HCNC

## 2020-07-07 PROCEDURE — 84100 ASSAY OF PHOSPHORUS: CPT | Mod: HCNC

## 2020-07-07 PROCEDURE — 63600175 PHARM REV CODE 636 W HCPCS: Mod: HCNC | Performed by: PHYSICIAN ASSISTANT

## 2020-07-07 PROCEDURE — 99900035 HC TECH TIME PER 15 MIN (STAT): Mod: HCNC

## 2020-07-07 PROCEDURE — 25000003 PHARM REV CODE 250: Mod: HCNC | Performed by: NURSE PRACTITIONER

## 2020-07-07 PROCEDURE — 36415 COLL VENOUS BLD VENIPUNCTURE: CPT | Mod: HCNC

## 2020-07-07 PROCEDURE — 25000003 PHARM REV CODE 250: Mod: HCNC | Performed by: INTERNAL MEDICINE

## 2020-07-07 PROCEDURE — 25000003 PHARM REV CODE 250: Mod: HCNC | Performed by: PHYSICIAN ASSISTANT

## 2020-07-07 PROCEDURE — 80048 BASIC METABOLIC PNL TOTAL CA: CPT | Mod: HCNC

## 2020-07-07 PROCEDURE — 85025 COMPLETE CBC W/AUTO DIFF WBC: CPT | Mod: HCNC

## 2020-07-07 RX ORDER — TRAMADOL HYDROCHLORIDE 50 MG/1
50 TABLET ORAL EVERY 6 HOURS PRN
Status: DISCONTINUED | OUTPATIENT
Start: 2020-07-07 | End: 2020-07-08 | Stop reason: HOSPADM

## 2020-07-07 RX ORDER — HYDROCODONE BITARTRATE AND ACETAMINOPHEN 5; 325 MG/1; MG/1
1 TABLET ORAL EVERY 6 HOURS PRN
Status: DISCONTINUED | OUTPATIENT
Start: 2020-07-07 | End: 2020-07-08 | Stop reason: HOSPADM

## 2020-07-07 RX ORDER — DILTIAZEM HYDROCHLORIDE 60 MG/1
60 TABLET, FILM COATED ORAL EVERY 12 HOURS
Status: DISCONTINUED | OUTPATIENT
Start: 2020-07-07 | End: 2020-07-08 | Stop reason: HOSPADM

## 2020-07-07 RX ADMIN — GABAPENTIN 100 MG: 100 CAPSULE ORAL at 09:07

## 2020-07-07 RX ADMIN — IPRATROPIUM BROMIDE AND ALBUTEROL SULFATE 3 ML: .5; 3 SOLUTION RESPIRATORY (INHALATION) at 05:07

## 2020-07-07 RX ADMIN — APIXABAN 5 MG: 2.5 TABLET, FILM COATED ORAL at 09:07

## 2020-07-07 RX ADMIN — PANTOPRAZOLE SODIUM 40 MG: 40 TABLET, DELAYED RELEASE ORAL at 08:07

## 2020-07-07 RX ADMIN — DOXYCYCLINE HYCLATE 100 MG: 100 TABLET, COATED ORAL at 08:07

## 2020-07-07 RX ADMIN — HYDROCODONE BITARTRATE AND ACETAMINOPHEN 1 TABLET: 5; 325 TABLET ORAL at 04:07

## 2020-07-07 RX ADMIN — ARFORMOTEROL TARTRATE 15 MCG: 15 SOLUTION RESPIRATORY (INHALATION) at 07:07

## 2020-07-07 RX ADMIN — BUDESONIDE 0.5 MG: 0.5 SUSPENSION RESPIRATORY (INHALATION) at 07:07

## 2020-07-07 RX ADMIN — BUTALBITAL, ACETAMINOPHEN, AND CAFFEINE 1 TABLET: 50; 325; 40 TABLET ORAL at 08:07

## 2020-07-07 RX ADMIN — SODIUM CHLORIDE: 0.9 INJECTION, SOLUTION INTRAVENOUS at 03:07

## 2020-07-07 RX ADMIN — PREDNISONE 5 MG: 5 TABLET ORAL at 08:07

## 2020-07-07 RX ADMIN — GABAPENTIN 100 MG: 100 CAPSULE ORAL at 08:07

## 2020-07-07 RX ADMIN — DOXYCYCLINE HYCLATE 100 MG: 100 TABLET, COATED ORAL at 09:07

## 2020-07-07 RX ADMIN — SERTRALINE HYDROCHLORIDE 100 MG: 50 TABLET ORAL at 09:07

## 2020-07-07 RX ADMIN — LATANOPROST 1 DROP: 50 SOLUTION OPHTHALMIC at 09:07

## 2020-07-07 RX ADMIN — BACLOFEN 10 MG: 10 TABLET ORAL at 08:07

## 2020-07-07 RX ADMIN — AMOXICILLIN AND CLAVULANATE POTASSIUM 1 TABLET: 875; 125 TABLET, FILM COATED ORAL at 09:07

## 2020-07-07 RX ADMIN — ATORVASTATIN CALCIUM 40 MG: 40 TABLET, FILM COATED ORAL at 09:07

## 2020-07-07 RX ADMIN — DILTIAZEM HYDROCHLORIDE 60 MG: 60 TABLET, FILM COATED ORAL at 09:07

## 2020-07-07 RX ADMIN — TAMSULOSIN HYDROCHLORIDE 0.4 MG: 0.4 CAPSULE ORAL at 08:07

## 2020-07-07 RX ADMIN — TRAZODONE HYDROCHLORIDE 200 MG: 100 TABLET ORAL at 09:07

## 2020-07-07 RX ADMIN — PREDNISONE 5 MG: 5 TABLET ORAL at 09:07

## 2020-07-07 RX ADMIN — GABAPENTIN 100 MG: 100 CAPSULE ORAL at 03:07

## 2020-07-07 RX ADMIN — ASPIRIN 81 MG: 81 TABLET, COATED ORAL at 08:07

## 2020-07-07 RX ADMIN — APIXABAN 5 MG: 2.5 TABLET, FILM COATED ORAL at 08:07

## 2020-07-07 RX ADMIN — AMOXICILLIN AND CLAVULANATE POTASSIUM 1 TABLET: 875; 125 TABLET, FILM COATED ORAL at 08:07

## 2020-07-07 RX ADMIN — METOPROLOL SUCCINATE 50 MG: 50 TABLET, EXTENDED RELEASE ORAL at 08:07

## 2020-07-07 NOTE — PLAN OF CARE
Remains injury free. Pain managed with oral medication. Repositions independently. Tolerating diet. No s/s acute distress. Will monitor.

## 2020-07-07 NOTE — PLAN OF CARE
CM assessed readmission. Pt stated that teaching was adequate and he had OH to assist with health care needs. Pt stated he has been noncompliant with meds for the past 5-6 years and has no certain reason why. He currently lives in an older home with his elderly father and assists with some of his caregiving. Pt stated due to his decreased mobility he feels needs additional assistance from what can be provided at home. PT/OT eval pending for potential SNF placement. Pt is receiving the max allowed for caregiver relief per Skylar Blanco with [facility]. Will continue to monitor for ongoing CM needs.        07/07/20 1125   Readmission Questionnaire   At the time of your discharge, did someone talk to you about what your health problems were? Yes   At the time of discharge, did someone talk to you about what to watch out for regarding worsening of your health problem? Yes   At the time of discharge, did someone talk to you about what to do if you experienced worsening of your health problem? Yes   At the time of discharge, did someone talk to you about which medication to take when you left the hospital and which ones to stop taking? Yes   At the time of discharge, did someone talk to you about when and where to follow up with a doctor after you left the hospital? Yes   What do you believe caused you to be sick enough to be re-admitted? Pt stated he needs more assistance and is noncompliant with meds   How often do you need to have someone help you when you read instructions, pamphlets, or other written material from your doctor or pharmacy? Never   Do you have problems taking your medications as prescribed? Yes  (Pt states he can have meds right next to bed and not take)   Do you have any problems affording any of  your prescribed medications? No   Do you have problems obtaining/receiving your medications? No   Does the patient have transportation to healthcare appointments? Yes  (Uses [facility] and medical  transport)   Lives With parent(s)   Living Arrangements house   Does the patient have family/friends to help with healtcare needs after discharge? no  (States he will need additional assistance. Possible SNF vs HH)   Who are your caregiver(s) and their phone number(s)? Erica Green,  [phone]   Does your caregiver provide all the help you need? No   If no, what kind of help do you need at home? Pt stated he needs additonal help due to decreased mobility and also cleaning his home. Currently receives caregiver relief through [facility] 2-3 hours once q 2 weeks.   Are you currently feeling confused? No   Are you currently having problems thinking? No   Are you currently having memory problems? No   Have you felt down, depressed, or hopeless? 0   Have you felt little interest or pleasure in doing things? 0   In the last 7 days, my sleep quality was: fair

## 2020-07-07 NOTE — ASSESSMENT & PLAN NOTE
-Status post amputation of left great toe on 6/29/20 with closure on 7/1/20.   -Wound appears mildly cellulitic.  -IV antibiotics.    -Podiatry consult.   -blood cultures with NGTD  -wound cultures grew BACTEROIDES OVATUS, MRSA, and HAFNIA ALVEI- sensitivities reviewed   -leukocytosis resolved

## 2020-07-07 NOTE — ASSESSMENT & PLAN NOTE
-Likely due to JOSE MARIA- improved   -creatinine normalized   -Trend troponin- downward trend   -Continue ASA and statin.

## 2020-07-07 NOTE — PLAN OF CARE
"Initial assessment completed. Met with patient. Patient lives at home with 85 year old father who suffered a past stroke and per pt only has speech deficits. Pt states he and father bot receive meals on wheels through the [facility] and also caregiver relief. Per contact at [facility] Skylar orestesmelvin,  [phone], pt receives 2-3 hrs every 2 weeks for caregiver relief with father. If pt does not return home by 07/14/2020 and will also have pt's meals on wheels placed on hold until he return home. She will need to be updated of d/c plans. Patient was updated on information and verbalized understanding. Pt currently has a rollator and straight cane.  Inquired about grab bar for tub. Instructed to purchase from [place] or the like, as insurance does not cover. No shower in home. Refused shower chair. Pt was previously providing self-care with assist of [facility] for wound care and required transportation to appointments. Annabelle Choudhary NP ordered PT/OT eval for SNF vs HH. Pt stated he "needs more help at home" and asked for SNF if possible. Choice form signed and referrals to be made by Ellie Jaeger RN to SNF with first available placement based on eval by PT. Pt in agreement with plan. Transitional Care Folder, Discharge Planning Begins on Admission pamphlet, [facility] Bedside Delivery pamphlet, Advance Directive information given to patient along with  contact information. Instructed patient or family to call with any questions or concerns.     D/c plan: SNF vs [facility]  PCP: Mateo Lambert MD  Preferred pharmacy: [facility]  D/c transport: will need transportation home upon discharge  [facility]: pending registration  Bedside pharmacy: yes       07/07/20 9223   Discharge Assessment   Assessment Type Discharge Planning Assessment   Confirmed/corrected address and phone number on facesheet? Yes   Assessment information obtained from? Patient   Expected Length of Stay (days)   (TBD) "   Communicated expected length of stay with patient/caregiver yes   Prior to hospitilization cognitive status: Alert/Oriented   Prior to hospitalization functional status: Assistive Equipment   Current cognitive status: Alert/Oriented   Current Functional Status: Assistive Equipment   Lives With parent(s)  (states lives with 85 year old father who has only speech deficits r/t past stroke)   Able to Return to Prior Arrangements yes   Is patient able to care for self after discharge? Unable to determine at this time (comments)  (pt to be evaluated by PT for SNF, otherwise plan to dc home back with [facility] for nursing and possible PT/OT)   Who are your caregiver(s) and their phone number(s)? Erica Green, aunt, [phone]   Patient's perception of discharge disposition skilled nursing facility;home health   Readmission Within the Last 30 Days other (see comments)  (Pt stated he doses not take medications as prescribed and has been an on-going issue for past 4-5 years.)   Patient currently being followed by outpatient case management? No   Patient currently receives any other outside agency services? Yes  (Pt currenlt receiving home health services through [facility] and caregiver relief for father 2-3 hrs once q 2 weeks through [facility])   How many hours a day does the patient receive services?   (Pt receives homemaker services once a month for 1-2 hrs per pt through the [facility])   Name and contact number of agency or person providing outside services [facility] [phone], [facility]   Is it the patient/care giver preference to resume care with the current outside agency? Yes  (If not candidate for SNF)   Equipment Currently Used at Home rollator;cane, straight   Do you have any problems affording any of your prescribed medications? No   Is the patient taking medications as prescribed? no   If no, which medications is patient not taking? All meds. Pt reports being noncompliant for 5-6 years   Does the patient have  transportation home? No  (will need transportation home)   Dialysis Name and Scheduled days NA   Does the patient receive services at [facility]? No   Discharge Plan A Skilled Nursing Facility;Home   Discharge Plan B Home;Home Health   DME Needed Upon Discharge  grab bar   Patient/Family in Agreement with Plan yes

## 2020-07-07 NOTE — SUBJECTIVE & OBJECTIVE
Interval History: pt stable and sitting to side of bed during exam. Pt reports pain at incision site and to LLE.  Leukocytosis resolved.  H/H stable and creatinine normal.  PT/OT evaluation in progress.      Review of Systems   Constitutional: Negative for appetite change, chills, diaphoresis, fatigue and fever.   HENT: Negative for congestion, ear pain, mouth sores, sore throat and trouble swallowing.    Eyes: Negative for pain and visual disturbance.   Respiratory: Negative for cough, chest tightness and shortness of breath.    Cardiovascular: Negative for chest pain, palpitations and leg swelling.   Gastrointestinal: Negative for abdominal pain, constipation, diarrhea and nausea.   Endocrine: Negative for cold intolerance, heat intolerance, polydipsia and polyuria.   Genitourinary: Negative for dysuria, frequency and hematuria.   Musculoskeletal: Positive for myalgias. Negative for arthralgias, back pain and neck pain.   Skin: Positive for wound. Negative for pallor and rash.   Allergic/Immunologic: Negative for environmental allergies and immunocompromised state.   Neurological: Positive for dizziness (resolved ) and weakness. Negative for seizures, syncope, numbness and headaches.   Hematological: Negative for adenopathy. Does not bruise/bleed easily.   Psychiatric/Behavioral: Negative for agitation, confusion and sleep disturbance.     Objective:     Vital Signs (Most Recent):  Temp: 98.4 °F (36.9 °C) (07/07/20 1112)  Pulse: 87 (07/07/20 1112)  Resp: 14 (07/07/20 1112)  BP: (!) 151/70 (07/07/20 1112)  SpO2: 96 % (07/07/20 1112) Vital Signs (24h Range):  Temp:  [97.6 °F (36.4 °C)-98.5 °F (36.9 °C)] 98.4 °F (36.9 °C)  Pulse:  [83-90] 87  Resp:  [14-20] 14  SpO2:  [92 %-98 %] 96 %  BP: (133-160)/(61-85) 151/70     Weight: 91.9 kg (202 lb 8 oz)  Body mass index is 33.7 kg/m².    Intake/Output Summary (Last 24 hours) at 7/7/2020 1439  Last data filed at 7/7/2020 0930  Gross per 24 hour   Intake 1690 ml   Output  2800 ml   Net -1110 ml      Physical Exam  Vitals signs and nursing note reviewed.   Constitutional:       Appearance: He is well-developed.   HENT:      Head: Normocephalic and atraumatic.   Eyes:      Conjunctiva/sclera: Conjunctivae normal.   Neck:      Musculoskeletal: Neck supple.      Vascular: No JVD.   Cardiovascular:      Rate and Rhythm: Normal rate and regular rhythm.      Heart sounds: Normal heart sounds.   Pulmonary:      Effort: Pulmonary effort is normal.      Breath sounds: Normal breath sounds. No wheezing.   Abdominal:      General: Bowel sounds are normal. There is no distension.      Palpations: Abdomen is soft.      Tenderness: There is no abdominal tenderness.   Genitourinary:     Comments: Deferred   Musculoskeletal: Normal range of motion.   Skin:     General: Skin is warm and dry.      Findings: No rash.      Comments: Left great toe amputation site with sutures and packing in tact. Mild erythema surrounding site. No drainage or induration noted.    Neurological:      Mental Status: He is alert and oriented to person, place, and time.   Psychiatric:         Behavior: Behavior normal.         Thought Content: Thought content normal.         Significant Labs:   Blood Culture:   Recent Labs   Lab 07/05/20  1531 07/05/20  1605   LABBLOO No Growth to date  No Growth to date No Growth to date  No Growth to date     CBC:   Recent Labs   Lab 07/05/20  1522 07/06/20  0334 07/07/20  0504   WBC 16.58* 16.54* 13.44*   HGB 9.4* 8.3* 8.9*   HCT 32.1* 27.7* 29.2*   * 312 314     CMP:   Recent Labs   Lab 07/05/20  1522 07/06/20  0334 07/07/20  0504    139 139   K 4.2 4.8 4.2    109 105   CO2 20* 22* 24   * 93 122*   BUN 20 22 13   CREATININE 3.6* 2.3* 1.1   CALCIUM 9.2 8.2* 8.8   PROT 8.3  --   --    ALBUMIN 2.7*  --   --    BILITOT 0.2  --   --    ALKPHOS 67  --   --    AST 17  --   --    ALT 23  --   --    ANIONGAP 12 8 10   EGFRNONAA 17* 29* >60       Significant Imaging:    Imaging Results          X-Ray Chest AP Portable (Final result)  Result time 07/05/20 15:51:20    Final result by Jayjay De La Rosa MD (07/05/20 15:51:20)                 Impression:      No acute abnormality.      Electronically signed by: Jayjay De La Rosa  Date:    07/05/2020  Time:    15:51             Narrative:    EXAMINATION:  XR CHEST AP PORTABLE    CLINICAL HISTORY:  Sepsis;.    TECHNIQUE:  Single frontal portable view of the chest was performed.    COMPARISON:  None    FINDINGS:  Support devices: None    The lungs are clear, with normal appearance of pulmonary vasculature and no pleural effusion or pneumothorax.    The cardiac silhouette is normal in size. The hilar and mediastinal contours are unremarkable.    Bones are intact.

## 2020-07-07 NOTE — ASSESSMENT & PLAN NOTE
-BP stable   -Most likely orthostatic due to poor intake associated with medication side effect.   -IV fluids.   -antihypertensive medications resumed   -Monitor orthostatic vital signs.

## 2020-07-07 NOTE — PROGRESS NOTES
[facility] Hospital Medicine  Progress Note    Patient Name: Crow Green  MRN: [id number]  Patient Class: IP- Inpatient   Admission Date: 7/5/2020  Length of Stay: 1 days  Attending Physician: Davina Donovan MD  Primary Care Provider: Mateo Lambert DO        Subjective:     Principal Problem:Hypotension        HPI:  Crow Green is a 65 year old male with DM II, CHF, atrial fibrillation and hypertension who presented to the ED with complaints of dizziness and falls at home. He states that symptoms began two days prior to presentation and typically occur when his moving from a sitting to a standing position. He also admits to nausea and poor intake since being discharged from the hospital on 7/3/20. He believes these symptoms are due to the doxycycline and Norco he is taking. The patient was hospitalized for an infected left great toe and underwent initial amputation on 6/29/20 with wound closure on 7/1/20. He is unsure if he is losing consciousness when he falls, but reports difficulty getting up. He denies fever, cough and shortness of breath. In the ED today, his creatinine was 3.6 which is elevated from 0.9 on 7/3/20. Other labs were significant for a WBC count of 16,580, albumin of 2.7, initial troponin of 0.099, initial lactic acid of 3.3 with a normal repeat and elevated specific gravity in his urine. Code status was discussed with the patient. He is a full code. His aunt, Awilda Moreno, is his surrogate medical decision maker.     Overview/Hospital Course:  Pt admitted to Observation Unit for Hypotension. Recent infected left great toe with initial amputation on 6/29/20 with wound closure on 7/1/20 of note.  JOSE MARIA noted.  Pt treated with IV hydration and IV antibiotics initiated.  Lactic acid normalized.  Leukocytosis noted.  Blood cultures with no growth to date.  H/H stable with troponin trending down.  Previous aerobic wound culture grew MRSA and HAFNIA ALVEI with anaerobic  culture growing BACTEROIDES OVATUS.  Pt verbalized symptom improvement with current plan of care continued.  On 7/7/2020, leukocytosis resolved on current therapy.  H/H stable and creatinine normalized.  Vital signs stable.  PT/OT evaluation ordered.  Blood cultures with no growth to date.  Wound cultures and sensitivities reviewed.     Interval History: pt stable and sitting to side of bed during exam. Pt reports pain at incision site and to LLE.  Leukocytosis resolved.  H/H stable and creatinine normal.  PT/OT evaluation in progress.      Review of Systems   Constitutional: Negative for appetite change, chills, diaphoresis, fatigue and fever.   HENT: Negative for congestion, ear pain, mouth sores, sore throat and trouble swallowing.    Eyes: Negative for pain and visual disturbance.   Respiratory: Negative for cough, chest tightness and shortness of breath.    Cardiovascular: Negative for chest pain, palpitations and leg swelling.   Gastrointestinal: Negative for abdominal pain, constipation, diarrhea and nausea.   Endocrine: Negative for cold intolerance, heat intolerance, polydipsia and polyuria.   Genitourinary: Negative for dysuria, frequency and hematuria.   Musculoskeletal: Positive for myalgias. Negative for arthralgias, back pain and neck pain.   Skin: Positive for wound. Negative for pallor and rash.   Allergic/Immunologic: Negative for environmental allergies and immunocompromised state.   Neurological: Positive for dizziness (resolved ) and weakness. Negative for seizures, syncope, numbness and headaches.   Hematological: Negative for adenopathy. Does not bruise/bleed easily.   Psychiatric/Behavioral: Negative for agitation, confusion and sleep disturbance.     Objective:     Vital Signs (Most Recent):  Temp: 98.4 °F (36.9 °C) (07/07/20 1112)  Pulse: 87 (07/07/20 1112)  Resp: 14 (07/07/20 1112)  BP: (!) 151/70 (07/07/20 1112)  SpO2: 96 % (07/07/20 1112) Vital Signs (24h Range):  Temp:  [97.6 °F (36.4  °C)-98.5 °F (36.9 °C)] 98.4 °F (36.9 °C)  Pulse:  [83-90] 87  Resp:  [14-20] 14  SpO2:  [92 %-98 %] 96 %  BP: (133-160)/(61-85) 151/70     Weight: 91.9 kg (202 lb 8 oz)  Body mass index is 33.7 kg/m².    Intake/Output Summary (Last 24 hours) at 7/7/2020 1439  Last data filed at 7/7/2020 0930  Gross per 24 hour   Intake 1690 ml   Output 2800 ml   Net -1110 ml      Physical Exam  Vitals signs and nursing note reviewed.   Constitutional:       Appearance: He is well-developed.   HENT:      Head: Normocephalic and atraumatic.   Eyes:      Conjunctiva/sclera: Conjunctivae normal.   Neck:      Musculoskeletal: Neck supple.      Vascular: No JVD.   Cardiovascular:      Rate and Rhythm: Normal rate and regular rhythm.      Heart sounds: Normal heart sounds.   Pulmonary:      Effort: Pulmonary effort is normal.      Breath sounds: Normal breath sounds. No wheezing.   Abdominal:      General: Bowel sounds are normal. There is no distension.      Palpations: Abdomen is soft.      Tenderness: There is no abdominal tenderness.   Genitourinary:     Comments: Deferred   Musculoskeletal: Normal range of motion.   Skin:     General: Skin is warm and dry.      Findings: No rash.      Comments: Left great toe amputation site with sutures and packing in tact. Mild erythema surrounding site. No drainage or induration noted.    Neurological:      Mental Status: He is alert and oriented to person, place, and time.   Psychiatric:         Behavior: Behavior normal.         Thought Content: Thought content normal.         Significant Labs:   Blood Culture:   Recent Labs   Lab 07/05/20  1531 07/05/20  1605   LABBLOO No Growth to date  No Growth to date No Growth to date  No Growth to date     CBC:   Recent Labs   Lab 07/05/20  1522 07/06/20  0334 07/07/20  0504   WBC 16.58* 16.54* 13.44*   HGB 9.4* 8.3* 8.9*   HCT 32.1* 27.7* 29.2*   * 312 314     CMP:   Recent Labs   Lab 07/05/20  1522 07/06/20  0334 07/07/20  0504    139 139    K 4.2 4.8 4.2    109 105   CO2 20* 22* 24   * 93 122*   BUN 20 22 13   CREATININE 3.6* 2.3* 1.1   CALCIUM 9.2 8.2* 8.8   PROT 8.3  --   --    ALBUMIN 2.7*  --   --    BILITOT 0.2  --   --    ALKPHOS 67  --   --    AST 17  --   --    ALT 23  --   --    ANIONGAP 12 8 10   EGFRNONAA 17* 29* >60       Significant Imaging:   Imaging Results          X-Ray Chest AP Portable (Final result)  Result time 07/05/20 15:51:20    Final result by Jayjay De La Rosa MD (07/05/20 15:51:20)                 Impression:      No acute abnormality.      Electronically signed by: Jayjay De La Rosa  Date:    07/05/2020  Time:    15:51             Narrative:    EXAMINATION:  XR CHEST AP PORTABLE    CLINICAL HISTORY:  Sepsis;.    TECHNIQUE:  Single frontal portable view of the chest was performed.    COMPARISON:  None    FINDINGS:  Support devices: None    The lungs are clear, with normal appearance of pulmonary vasculature and no pleural effusion or pneumothorax.    The cardiac silhouette is normal in size. The hilar and mediastinal contours are unremarkable.    Bones are intact.                                Assessment/Plan:      * Hypotension  -BP stable   -Most likely orthostatic due to poor intake associated with medication side effect.   -IV fluids.   -antihypertensive medications resumed   -Monitor orthostatic vital signs.         Dermatomyositis  -Hold Imuran due to active infection.   -Continue prednisone.       Elevated troponin  -Likely due to JOSE MARIA- improved   -creatinine normalized   -Trend troponin- downward trend   -Continue ASA and statin.       Diabetic ulcer of toe of left foot associated with type 2 diabetes mellitus, with necrosis of muscle  -Status post amputation of left great toe on 6/29/20 with closure on 7/1/20.   -Wound appears mildly cellulitic.  -IV antibiotics.    -Podiatry consult.   -blood cultures with NGTD  -wound cultures grew BACTEROIDES OVATUS, MRSA, and HAFNIA ALVEI- sensitivities reviewed    -leukocytosis resolved       JOSE MARIA (acute kidney injury)  -improved to 2.3>1.1  -IV fluids.   -Monitor  -repeat CMP in am       PAF (paroxysmal atrial fibrillation)  -Currently in NSR.   -Continue metoprolol with blood pressure parameters.   -Cardizem resumed  -Continue Eliqius.       SANDRITA on CPAP  CPAP QHS.       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   -ADA diet.   -Hemoglobin A1C was 7.7 on 5/19/20.       Chronic combined systolic and diastolic HFrEF of 45%  -Appears compensated.   -gentle IV fluids   -monitor for volume overload  -Continue metoprolol with blood pressure parameters.     Hypertension associated with diabetes  -stable   -will resume home medications as appropriate   Hold antihypertensives due to orthostatic hypotension.         VTE Risk Mitigation (From admission, onward)         Ordered     apixaban tablet 5 mg  2 times daily      07/05/20 4067                      Annabelle Choudhary NP  Department of Hospital Medicine   [facility]

## 2020-07-07 NOTE — PLAN OF CARE
Problem: Adult Inpatient Plan of Care  Goal: Plan of Care Review  Outcome: Ongoing, Progressing     POC reviewed, including indications and possible side effects of administered medications. Patient verbalized understanding and teach back. No adverse reactions noted. Patient c/o headache. Administered medications per order. Monitoring Cbg's. NSR on heart monitor. VSS. NADN. Patient remains free of falls and injuries during shift. Will continue to monitor.    Chart check complete.

## 2020-07-07 NOTE — ASSESSMENT & PLAN NOTE
-Currently in NSR.   -Continue metoprolol with blood pressure parameters.   -Cardizem resumed  -Continue Eliqius.

## 2020-07-07 NOTE — ASSESSMENT & PLAN NOTE
-Appears compensated.   -gentle IV fluids   -monitor for volume overload  -Continue metoprolol with blood pressure parameters.

## 2020-07-08 VITALS
BODY MASS INDEX: 33.74 KG/M2 | RESPIRATION RATE: 18 BRPM | OXYGEN SATURATION: 93 % | WEIGHT: 202.5 LBS | HEIGHT: 65 IN | DIASTOLIC BLOOD PRESSURE: 75 MMHG | SYSTOLIC BLOOD PRESSURE: 112 MMHG | HEART RATE: 87 BPM | TEMPERATURE: 97 F

## 2020-07-08 DIAGNOSIS — Z89.412 STATUS POST AMPUTATION OF LEFT GREAT TOE: Primary | ICD-10-CM

## 2020-07-08 DIAGNOSIS — E11.49 TYPE II DIABETES MELLITUS WITH NEUROLOGICAL MANIFESTATIONS: ICD-10-CM

## 2020-07-08 LAB
ANION GAP SERPL CALC-SCNC: 11 MMOL/L (ref 8–16)
BASOPHILS # BLD AUTO: 0.03 K/UL (ref 0–0.2)
BASOPHILS NFR BLD: 0.3 % (ref 0–1.9)
BUN SERPL-MCNC: 10 MG/DL (ref 8–23)
CALCIUM SERPL-MCNC: 9.4 MG/DL (ref 8.7–10.5)
CHLORIDE SERPL-SCNC: 102 MMOL/L (ref 95–110)
CO2 SERPL-SCNC: 24 MMOL/L (ref 23–29)
CREAT SERPL-MCNC: 0.9 MG/DL (ref 0.5–1.4)
DIFFERENTIAL METHOD: ABNORMAL
EOSINOPHIL # BLD AUTO: 0.3 K/UL (ref 0–0.5)
EOSINOPHIL NFR BLD: 2.5 % (ref 0–8)
ERYTHROCYTE [DISTWIDTH] IN BLOOD BY AUTOMATED COUNT: 14.5 % (ref 11.5–14.5)
EST. GFR  (AFRICAN AMERICAN): >60 ML/MIN/1.73 M^2
EST. GFR  (NON AFRICAN AMERICAN): >60 ML/MIN/1.73 M^2
GLUCOSE SERPL-MCNC: 112 MG/DL (ref 70–110)
HCT VFR BLD AUTO: 30.8 % (ref 40–54)
HGB BLD-MCNC: 9.4 G/DL (ref 14–18)
IMM GRANULOCYTES # BLD AUTO: 0.05 K/UL (ref 0–0.04)
IMM GRANULOCYTES NFR BLD AUTO: 0.4 % (ref 0–0.5)
LYMPHOCYTES # BLD AUTO: 2.2 K/UL (ref 1–4.8)
LYMPHOCYTES NFR BLD: 19.4 % (ref 18–48)
MAGNESIUM SERPL-MCNC: 1.5 MG/DL (ref 1.6–2.6)
MCH RBC QN AUTO: 26.6 PG (ref 27–31)
MCHC RBC AUTO-ENTMCNC: 30.5 G/DL (ref 32–36)
MCV RBC AUTO: 87 FL (ref 82–98)
MONOCYTES # BLD AUTO: 0.9 K/UL (ref 0.3–1)
MONOCYTES NFR BLD: 8.1 % (ref 4–15)
NEUTROPHILS # BLD AUTO: 7.8 K/UL (ref 1.8–7.7)
NEUTROPHILS NFR BLD: 69.3 % (ref 38–73)
NRBC BLD-RTO: 0 /100 WBC
PHOSPHATE SERPL-MCNC: 3 MG/DL (ref 2.7–4.5)
PLATELET # BLD AUTO: 316 K/UL (ref 150–350)
PMV BLD AUTO: 9.6 FL (ref 9.2–12.9)
POCT GLUCOSE: 105 MG/DL (ref 70–110)
POCT GLUCOSE: 203 MG/DL (ref 70–110)
POTASSIUM SERPL-SCNC: 4.7 MMOL/L (ref 3.5–5.1)
RBC # BLD AUTO: 3.54 M/UL (ref 4.6–6.2)
SODIUM SERPL-SCNC: 137 MMOL/L (ref 136–145)
WBC # BLD AUTO: 11.27 K/UL (ref 3.9–12.7)

## 2020-07-08 PROCEDURE — 36415 COLL VENOUS BLD VENIPUNCTURE: CPT | Mod: HCNC

## 2020-07-08 PROCEDURE — 84100 ASSAY OF PHOSPHORUS: CPT | Mod: HCNC

## 2020-07-08 PROCEDURE — 25000242 PHARM REV CODE 250 ALT 637 W/ HCPCS: Mod: HCNC | Performed by: PHYSICIAN ASSISTANT

## 2020-07-08 PROCEDURE — 25000003 PHARM REV CODE 250: Mod: HCNC | Performed by: INTERNAL MEDICINE

## 2020-07-08 PROCEDURE — 97165 OT EVAL LOW COMPLEX 30 MIN: CPT | Mod: HCNC

## 2020-07-08 PROCEDURE — 83735 ASSAY OF MAGNESIUM: CPT | Mod: HCNC

## 2020-07-08 PROCEDURE — 85025 COMPLETE CBC W/AUTO DIFF WBC: CPT | Mod: HCNC

## 2020-07-08 PROCEDURE — 25000003 PHARM REV CODE 250: Mod: HCNC | Performed by: PHYSICIAN ASSISTANT

## 2020-07-08 PROCEDURE — 63600175 PHARM REV CODE 636 W HCPCS: Mod: HCNC | Performed by: PHYSICIAN ASSISTANT

## 2020-07-08 PROCEDURE — 80048 BASIC METABOLIC PNL TOTAL CA: CPT | Mod: HCNC

## 2020-07-08 PROCEDURE — 94640 AIRWAY INHALATION TREATMENT: CPT | Mod: HCNC

## 2020-07-08 PROCEDURE — 97161 PT EVAL LOW COMPLEX 20 MIN: CPT | Mod: HCNC

## 2020-07-08 PROCEDURE — 97530 THERAPEUTIC ACTIVITIES: CPT | Mod: HCNC

## 2020-07-08 PROCEDURE — 94761 N-INVAS EAR/PLS OXIMETRY MLT: CPT | Mod: HCNC

## 2020-07-08 PROCEDURE — 25000003 PHARM REV CODE 250: Mod: HCNC | Performed by: NURSE PRACTITIONER

## 2020-07-08 PROCEDURE — 63600175 PHARM REV CODE 636 W HCPCS: Mod: HCNC | Performed by: NURSE PRACTITIONER

## 2020-07-08 RX ORDER — LANOLIN ALCOHOL/MO/W.PET/CERES
400 CREAM (GRAM) TOPICAL DAILY
Status: DISCONTINUED | OUTPATIENT
Start: 2020-07-09 | End: 2020-07-08 | Stop reason: HOSPADM

## 2020-07-08 RX ORDER — DOXYCYCLINE HYCLATE 100 MG
100 TABLET ORAL EVERY 12 HOURS
Qty: 10 TABLET | Refills: 0 | Status: SHIPPED | OUTPATIENT
Start: 2020-07-08 | End: 2020-07-13

## 2020-07-08 RX ORDER — MAGNESIUM SULFATE HEPTAHYDRATE 40 MG/ML
2 INJECTION, SOLUTION INTRAVENOUS ONCE
Status: COMPLETED | OUTPATIENT
Start: 2020-07-08 | End: 2020-07-08

## 2020-07-08 RX ORDER — LANOLIN ALCOHOL/MO/W.PET/CERES
400 CREAM (GRAM) TOPICAL DAILY
Refills: 0 | Status: ON HOLD | COMMUNITY
Start: 2020-07-09 | End: 2022-03-05 | Stop reason: HOSPADM

## 2020-07-08 RX ORDER — AMOXICILLIN AND CLAVULANATE POTASSIUM 875; 125 MG/1; MG/1
1 TABLET, FILM COATED ORAL EVERY 12 HOURS
Qty: 12 TABLET | Refills: 0 | Status: SHIPPED | OUTPATIENT
Start: 2020-07-08 | End: 2020-07-14

## 2020-07-08 RX ADMIN — PREDNISONE 5 MG: 5 TABLET ORAL at 08:07

## 2020-07-08 RX ADMIN — APIXABAN 5 MG: 2.5 TABLET, FILM COATED ORAL at 08:07

## 2020-07-08 RX ADMIN — ASPIRIN 81 MG: 81 TABLET, COATED ORAL at 08:07

## 2020-07-08 RX ADMIN — ARFORMOTEROL TARTRATE 15 MCG: 15 SOLUTION RESPIRATORY (INHALATION) at 07:07

## 2020-07-08 RX ADMIN — TAMSULOSIN HYDROCHLORIDE 0.4 MG: 0.4 CAPSULE ORAL at 08:07

## 2020-07-08 RX ADMIN — DILTIAZEM HYDROCHLORIDE 60 MG: 60 TABLET, FILM COATED ORAL at 08:07

## 2020-07-08 RX ADMIN — GABAPENTIN 100 MG: 100 CAPSULE ORAL at 08:07

## 2020-07-08 RX ADMIN — BACLOFEN 10 MG: 10 TABLET ORAL at 08:07

## 2020-07-08 RX ADMIN — HYDROCODONE BITARTRATE AND ACETAMINOPHEN 1 TABLET: 5; 325 TABLET ORAL at 08:07

## 2020-07-08 RX ADMIN — AMOXICILLIN AND CLAVULANATE POTASSIUM 1 TABLET: 875; 125 TABLET, FILM COATED ORAL at 08:07

## 2020-07-08 RX ADMIN — METOPROLOL SUCCINATE 50 MG: 50 TABLET, EXTENDED RELEASE ORAL at 08:07

## 2020-07-08 RX ADMIN — PANTOPRAZOLE SODIUM 40 MG: 40 TABLET, DELAYED RELEASE ORAL at 08:07

## 2020-07-08 RX ADMIN — INSULIN ASPART 2 UNITS: 100 INJECTION, SOLUTION INTRAVENOUS; SUBCUTANEOUS at 11:07

## 2020-07-08 RX ADMIN — MAGNESIUM SULFATE 2 G: 2 INJECTION INTRAVENOUS at 08:07

## 2020-07-08 RX ADMIN — BUDESONIDE 0.5 MG: 0.5 SUSPENSION RESPIRATORY (INHALATION) at 07:07

## 2020-07-08 RX ADMIN — DOXYCYCLINE HYCLATE 100 MG: 100 TABLET, COATED ORAL at 08:07

## 2020-07-08 NOTE — PT/OT/SLP EVAL
Occupational Therapy   Evaluation and Discharge Note    Name: Crow Green  MRN: [id number]  Admitting Diagnosis:  Hypotension      Recommendations:     Discharge Recommendations: home health OT  Discharge Equipment Recommendations:  none  Barriers to discharge:       Assessment:     Crow Green is a 65 y.o. male with a medical diagnosis of Hypotension. At this time, patient is functioning at their prior level of function and does not require further acute OT services.     Plan:     During this hospitalization, patient does not require further acute OT services.  Please re-consult if situation changes.    · Plan of Care Reviewed with:      Subjective     Chief Complaint: debility and generalized weakness  Patient/Family Comments/goals    Occupational Profile:  Living Environment: pt reports lives with father in 1 story house  With no steps  Previous level of function: mod (I) with adl's and functional mopbility  Roles and Routines: occupational therapy  Equipment Used at home:     Assistance upon Discharge:     Pain/Comfort:  ·      Patients cultural, spiritual, Restoration conflicts given the current situation:      Objective:     Communicated with: nurse and epic chart review prior to session.  Patient found up in chair with peripheral IV upon OT entry to room.    General Precautions: Standard, fall   Orthopedic Precautions:N/A   Braces: N/A     Occupational Performance:        Functional Mobility/Transfers:  · Patient completed Sit <> Stand Transfer with stand by assistance  with  no assistive device   · Functional Mobility: pt ambulated 10 feet with sba/ mod without ae    Activities of Daily Living:  · Lower Body Dressing: minimum assistance helene shoes    Cognitive/Visual Perceptual:  Cognitive/Psychosocial Skills:     -       Oriented to: Person, Place, Time and Situation   -       Follows Commands/attention:Follows two-step commands  -       Communication: clear/fluent  -       Memory: No Deficits  noted  -       Safety awareness/insight to disability: impaired     Physical Exam:  Upper Extremity Range of Motion:     -       Right Upper Extremity: WFL  -       Left Upper Extremity: WFL  Upper Extremity Strength:    -       Right Upper Extremity: mmt: 3/5 grossly  -       Left Upper Extremity: mmt: 3/5 grossly   Strength:    -       Right Upper Extremity: mmt: 3/5 grossly  -       Left Upper Extremity: mmt: 3/5 grossly    AMPAC 6 Click ADL:  AMPAC Total Score: 19    Treatment & Education:Education:    Pt req sba with sit<>stand t/f's holding on bed rail and ambulated 8 feet with sba while holding on to furniture intermittently .pt refused rolling walker or own spc.  Pt req min a with helene shoes and unaware of strapping diabetic shoe  Correctly.  Pt refused to perform other lb dressing and reported have sock aide at home which he use daily. Pt educated on b ue rom exercise and verbalized understanding of hep. Pt reported wanting to go home and refused stair training adamantly. Pt left sitting eob per pt request. see above for details  Patient left up in chair with all lines intact, call button in reach and NP chava notified    GOALS:   Multidisciplinary Problems     Occupational Therapy Goals     Not on file                History:     Past Medical History:   Diagnosis Date    Arthritis     Asthma     Atrial fibrillation     Atrial fibrillation with RVR 8/7/2019    CHF (congestive heart failure)     Depression     Dermatomyositis     Diabetes mellitus, type 2 Diagnosed in 2000    Elevated PSA     Hypertension     Sleep apnea        Past Surgical History:   Procedure Laterality Date    ABLATION OF ARRHYTHMOGENIC FOCUS FOR ATRIAL FIBRILLATION N/A 2/27/2020    Procedure: Ablation atrial fibrillation;  Surgeon: Gio Brown MD;  Location: [facility];  Service: Cardiology;  Laterality: N/A;  afib, DAMIEN (cx if SR), PVI, PITO, anes, MB, 3 Prep    CHOLECYSTECTOMY      CLOSURE OF WOUND Left  7/1/2020    Procedure: CLOSURE, WOUND;  Surgeon: Barb Veras DPM;  Location: [facility];  Service: Podiatry;  Laterality: Left;    SELECTIVE INJECTION OF ANESTHETIC AGENT AROUND LUMBAR SPINAL NERVE ROOT BY TRANSFORAMINAL APPROACH Left 6/11/2020    Procedure: BLOCK, SPINAL NERVE ROOT, LUMBAR, SELECTIVE, TRANSFORAMINAL APPROACH Left L4-5, L5-S1 TFESI with RN IV sedation;  Surgeon: Dillan Tsai MD;  Location: [facility];  Service: Pain Management;  Laterality: Left;    TOE AMPUTATION Left 6/29/2020    Procedure: AMPUTATION, TOE;  Surgeon: Barb Veras DPM;  Location: [facility];  Service: Podiatry;  Laterality: Left;  great toe       Time Tracking:     OT Date of Treatment: 07/08/20  OT Start Time: 1105  OT Stop Time: 1130  OT Total Time (min): 25 min    Billable Minutes:Evaluation 10 minutes  Therapeutic Activity 13 minutes    Angélica Dill OT  7/8/2020

## 2020-07-08 NOTE — PLAN OF CARE
07/08/20 1557   Final Note   Assessment Type Final Discharge Note   Anticipated Discharge Disposition Home-Health   Right Care Referral Info   Post Acute Recommendation Home-care   Facility Name [facility]

## 2020-07-08 NOTE — PLAN OF CARE
Call received from patient he stated that he has decided to go home with home health. Preference letter was secured at time of initial assessment. Referral placed in [facility] to [facility].  Patient requested to be discharged  before 1230. Message update sent to provider.       07/08/20 8812   Post-Acute Status   Post-Acute Authorization Home Health   Home Health Status Referrals Sent   Discharge Delays (!) Other

## 2020-07-08 NOTE — NURSING
AVS reviewed with patient. Follow up appointment and d/c medication discussed, D/C medication to be delivered to bedside per pharmacy. Patient verbalized understanding with teach back. No complaints at this time. No further needs. IV removed per order. Patient tolerated well. To be transported home per family.

## 2020-07-08 NOTE — PLAN OF CARE
Problem: Adult Inpatient Plan of Care  Goal: Plan of Care Review  Outcome: Ongoing, Progressing     POC reviewed, including indications and possible side effects of administered medications. Patient verbalized understanding and teach back. No adverse reactions noted. Patient c/o BLE pain. Administered medications per order. Monitoring Cbg's. NSR on heart monitor. VSS. NADN. Patient remains free of falls and injuries during shift. Will continue to monitor.     Chart check complete.

## 2020-07-08 NOTE — NURSING
Patient has hx of CHF. Currently has continuous NS IVF @ 100mL/hr. Urine output is doing well but suspected fluid overload. Earlier his lung sounds were coarse and now he has crackles with exp wheezing. Sating at 93%. Refusing Cpap and O2 via NC. Informed Dr. Charles via secure chat. New orders noted. IVF stopped and will have CXR in AM. Patient is resting comfortably. NADN. VSS. Will continue to monitor.

## 2020-07-08 NOTE — PT/OT/SLP EVAL
Physical Therapy Evaluation and Discharge Note    Patient Name:  Crow Green   MRN:  [id number]    Recommendations:     Discharge Recommendations:  home health PT(PT WOULD BENEFIT FROM HOME SAFETY EVAL)   Discharge Equipment Recommendations:     Barriers to discharge: None    Assessment:     Crow Green is a 65 y.o. male admitted with a medical diagnosis of Hypotension. .  At this time, patient is functioning at their prior level of function and does not require further acute PT services.     Recent Surgery: * No surgery found *      Plan:     During this hospitalization, patient does not require further acute PT services.  Please re-consult if situation changes.      Subjective     Chief Complaint: L LE PAIN   Patient/Family Comments/goals: NONE STATED   Pain/Comfort:  · Pain Rating 1: 4/10  · Location - Side 1: Left  · Location 1: leg  · Pain Addressed 1: Pre-medicate for activity  · Pain Rating Post-Intervention 1: 4/10    Patients cultural, spiritual, Holiness conflicts given the current situation:      Living Environment:   PT LIVES AT HOME WITH FATHER AND HAS 3 STEPS WITH NO RAILING HOWEVER A WALL TO HOLD ON TO  Prior to admission, patients level of function was MOD I WITH ROLLATOR.  Equipment used at home:  .  DME owned (not currently used): none.  Upon discharge, patient will have assistance from FAMILY.    Objective:     Communicated with NURSE TILLMAN AND Psychiatric CHART REVIEW  prior to session.  Patient found up in chair with peripheral IV upon PT entry to room.    General Precautions: Standard, fall   Orthopedic Precautions:N/A   Braces: N/A     Exams:  · RLE ROM: WFL  · RLE Strength: WFL  · LLE ROM: WFL  · LLE Strength: WFL    Functional Mobility:  PT MET IN RM SEATED ON COUCH. PT REPORTED HE WAS AMBULATORY WITH ROLLATOR AND NP PRESENT REPORTING HE WAS CONCERNED ABOUT STAIR TRAINING. PT REFUSED STAIR TRAINING AND GT TRAINING UPON EVAL. PT GT TRAINED X 10' IN ROOM FELIX HOWEVER DECLINED  PROGRESSING WITH GT AND STAIRS. P.T. EDUCATED PT ON BENEFIT FROM P.T. AND LEFT SEATED EOB. P.T. TO REC HH P.T. FOR HOME SAFETY EVAL.        AM-PAC 6 CLICK MOBILITY  Total Score:21     Patient left SEATED EOB with call button in reach.    GOALS:   Multidisciplinary Problems     Physical Therapy Goals     Not on file                History:     Past Medical History:   Diagnosis Date    Arthritis     Asthma     Atrial fibrillation     Atrial fibrillation with RVR 8/7/2019    CHF (congestive heart failure)     Depression     Dermatomyositis     Diabetes mellitus, type 2 Diagnosed in 2000    Elevated PSA     Hypertension     Sleep apnea        Past Surgical History:   Procedure Laterality Date    ABLATION OF ARRHYTHMOGENIC FOCUS FOR ATRIAL FIBRILLATION N/A 2/27/2020    Procedure: Ablation atrial fibrillation;  Surgeon: Gio Brown MD;  Location: [facility] EP LAB;  Service: Cardiology;  Laterality: N/A;  afib, DAMIEN (cx if SR), PVI, PITO, anes, MB, 3 Prep    CHOLECYSTECTOMY      CLOSURE OF WOUND Left 7/1/2020    Procedure: CLOSURE, WOUND;  Surgeon: Barb Veras DPM;  Location: [facility];  Service: Podiatry;  Laterality: Left;    SELECTIVE INJECTION OF ANESTHETIC AGENT AROUND LUMBAR SPINAL NERVE ROOT BY TRANSFORAMINAL APPROACH Left 6/11/2020    Procedure: BLOCK, SPINAL NERVE ROOT, LUMBAR, SELECTIVE, TRANSFORAMINAL APPROACH Left L4-5, L5-S1 TFESI with RN IV sedation;  Surgeon: Dillan Tsai MD;  Location: [facility] PAIN MGT;  Service: Pain Management;  Laterality: Left;    TOE AMPUTATION Left 6/29/2020    Procedure: AMPUTATION, TOE;  Surgeon: Barb Veras DPM;  Location: [facility] OR;  Service: Podiatry;  Laterality: Left;  great toe       Time Tracking:     PT Received On: 07/08/20  PT Start Time: 1055     PT Stop Time: 1110  PT Total Time (min): 15 min     Billable Minutes: Evaluation 15      Sheila Salas, PT  07/08/2020

## 2020-07-08 NOTE — PLAN OF CARE
Patient requested for  to arrange transportation to his follow up appointments scheduled for tomorrow. Contacted [employer] , Adam @ [phone] schedule transportation for tomorrow from patient's resident to Dr HEMANT Lambert at the [facility] for a 1020am appointment . Then transportation will pick him up to go to an appointment with Dr Luciano at [facility] for 2pm and return home. Trip # [id number]. Update to patient.       07/08/20 1406   Post-Acute Status   Post-Acute Authorization Other   Other Status Community Services   Discharge Delays (!) Other

## 2020-07-09 ENCOUNTER — TELEPHONE (OUTPATIENT)
Dept: INTERNAL MEDICINE | Facility: CLINIC | Age: 65
End: 2020-07-09

## 2020-07-09 NOTE — PHYSICIAN QUERY
PT Name: Crow Green  MR #: [id number]     Diabetic Condition Clarification     CDS/: Keeley Sousa RN               Contact information: [email or web address]  This form is a permanent document in the medical record.     Query Date: July 9, 2020    By submitting this query, we are merely seeking further clarification of documentation to reflect the severity of illness of your patient. Please utilize your independent clinical judgment when addressing the question(s) below.    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       Glucose 15-> 203 Progress note 7/7       Hospital Medicine    Point of care 7/7, 7/8      Beta-OHxy Butyric Acid levels      Serum Osmolarity      pH      Anion gap/ Bicarb levels     x Treatment/Medication -NISS.   -ADA diet. Progress note 7/7       Hospital Medicine    Other       Provider, please specify the meaning of the term uncontrolled:    [ x  ] Diabetes mellitus Type 2 with hyperglycemia     [   ] Other diabetes complication (please specify): ____________     [   ]  Clinically Undetermined       Please document in your progress notes daily for the duration of treatment until resolved, and include in your discharge summary.

## 2020-07-09 NOTE — TELEPHONE ENCOUNTER
----- Message from Cheryl Hull sent at 7/9/2020  1:52 PM CDT -----  Contact: pt  The pt request a return call, no additional info given and can be reached at [phone]///[email or web address]

## 2020-07-09 NOTE — PHYSICIAN QUERY
PT Name: Crow Green  MR #: [id number]     HYPERTENSIVE CONDITION CLARIFICATION     CDS/: Keeley Sousa RN              Contact information: [email or web address]  This form is a permanent document in the medical record.     Query Date: July 9, 2020    By submitting this query, we are merely seeking further clarification of documentation to reflect the severity of illness of your patient. Please utilize your independent clinical judgment when addressing the question(s) below.    The medical record reflects the following:     Indicators   Supporting Clinical Findings Location in Medical Record   x Hypertension or hypertensive documented Hypertension associated with diabetes   -stable   -will resume home medications as appropriate   Hold antihypertensives due to orthostatic hypotension.    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      [ x  ] Essential (primary) hypertension (Hypertension is not a manifestation of DM )     [   ] Secondary hypertension (Hypertension is a manifestation of DM )     [   ] Other cardiovascular condition (please specify): __________     [   ]  Clinically Undetermined         Please document in your progress notes daily for the duration of treatment until resolved, and include in your discharge summary.

## 2020-07-10 ENCOUNTER — OUTPATIENT CASE MANAGEMENT (OUTPATIENT)
Dept: ADMINISTRATIVE | Facility: OTHER | Age: 65
End: 2020-07-10

## 2020-07-10 LAB
BACTERIA BLD CULT: NORMAL
BACTERIA BLD CULT: NORMAL

## 2020-07-10 NOTE — LETTER
July 10, 2020    Crow Green  [address]  [facility] [address]             [facility] [address] [place] [address] Welcome to [facility]s Complex Care Management Program.  It was a pleasure talking with you today.  My name is Dang Clay. I look forward to working with you as your .  My goal is to help you function at the healthiest and highest level possible.  You can contact me directly at [phone].    As an [facility] patient with [employer], some of the services we may be able to provide include:      Development of an individualized care plan with a Registered Nurse    Connection with a    Connection with available resources and services     Coordinate communication among your care team members    Provide coaching and education    Help you understand your doctors treatment plan   Help you obtain information about your insurance coverage.     All services provided by [facility]s Complex Care Managers and other care team members are coordinated with and communicated to your primary care team.    As part of your enrollment, you will be receiving education materials and more information about these services in your [facility] account, by phone or through the mail.  If you do not wish to participate or receive information, please contact our office at [phone].      Sincerely,        Dang Clay RN, CCM [facility]   Out-patient RN Complex Care Manager

## 2020-07-10 NOTE — PROGRESS NOTES
Outpatient Care Management  Initial Patient Assessment    Patient: Crow Green  MRN: [id number]  Date of Service: 07/10/2020  Completed by: Dang Clay RN  Referral Date: 06/29/2020  Program: Case Management (High Risk)    Reason for Visit   Patient presents with    OPCM Enrollment Call    Initial Assessment    PHQ-9    Plan Of Care       Brief Summary: This 64 y/o male was referred to OPCM by hospitalist while recently inpatient at [facility] for L great toe amputation then subsequent dehydration with JOSE MARIA. He was d/c'd home after the amputation where he became dehydrated which required a second hospitalization and that is when OPCM referral was made. Referral diagnosis for OPCM was CHF. 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 OPCM was explained to pt and he agreed to participation in program. He advised he lives in a family owned home with his father, who he reports he is primary caregiver for. He advised the home has steps up to the front door that do no have a hand rail and it also has no grab bars in the bathroom around the tub. He would like to see if they can get some assistance with home modifications to make it more safe, but would like to get a wheelchair ramp to the front door rather than a hand rail for the steps as he would like to see if he can get a motorized scooter. He currently uses a cane or rollator for all mobility and reports he has had numerous falls in the past 12 months. He is currently receiving home health services from [facility], including  for wound care to L great toe amputation site and PT. He reports being unsure if he is receiving OT or HHA services. He reports he needs HHA services to assist with bathing as he is having frequent fecal incontinence. He  "voices good compliance with meds and diet, but later states he went to [employer] and got "2 for $6" for himself and for his father today, so each of them could have a burger(Whopper?) for lunch and dinner. Discussed [employer] coverage that includes Mom's Meals benefit, as well as COVID food insecurity meal benefit and both numbers were provided with instructions to call and request meal delivery from each. He reports he is accessing his OTC benefit to purchase incontinence briefs. He voices extreme dissatisfaction with his [employer] transportation benefit as they did not show up yesterday to transport him to his follow up provider appt with surgeon that recently amputated his L great toe. Encouraged to call several days in advance to schedule transportation, then call again 2-3 days prior to appt to re-confirm. Reviewed diet restrictions of low Na to control CHF and HTN, low fat/cholesterol to help control CAD and low carb to help manage DM. Advised this CM will mail some educational literature about CHF and DM and will follow up in one week to discuss both disease processes. Advised CM will include an advanced directive packet in the mail out as he reports he does not currently have one and will also refer him to the [facility] LCSW to assist with accessing community resources to help with needed home modifications. He voiced understanding and agreement with this plan for follow up.     Assessment Documentation     [facility] Initial Assessment    Involvement of Care  Do I have permission to speak with other family members about your care?: Yes (Comment: Vinjessica Norma, relative)  Assessment completed by: Patient  Patient Reported Insurance  Verified current insurance plan: [employer] Medicare Advantage  [employer] benefits discussed: [employer], Transportation, Silver Sneakers, OTC prescription discounts, Mail Order Pharmacy, Other (See comments) (Comment: Provided COVID meal benefit phone number)  Current Health Status  Patient " Health Rating  Compared to other people your age, how would you rate your health?: Poor  Patient Reported Labs & Vitals  Any patient reported labs and/or vitals?: No (Comment: Home health nurse is performing VS assessments on her visits. )  Social Determinants  Advanced Care Planning  Do you have any of the following?: None  If yes, do we have a copy?: N/A  If no, would the patient like Advance Directive resources?: Yes  Advanced Care Planning resources provided?: Yes  Is Advanced Care Planning an area of need?: Yes  Support  Caregiver presence?: No  Present activity level: need help from person or special equipment to leave house (Comment: Uses a cane or rollator. Wants to see if he qualifies for a motorized scooter. )  Who takes you to your medical appointments?: other (see comment) (Comment: [employer] and [employer] transportation )  Housing  Living arrangements: family members (Comment: Lives with his father. )  Number of people in home: 2  Type of residence: single family home  Own or rent?: own  Permanent residence?: yes  Does the patient's residence have any of the following?: more than 2 stairs to enter the residence (Comment: No grab bars around tub. Needs ramp to front door. )  Is housing an area of need?: yes (Comment: Will refer to SW to assist with accessing any available housing modification resources. )  Access to [employer] & Technology  Does the patient have access to any of the following devices or technologies?: SmartPhone  Clinical Assessment  Medication Adherence  How does the patient obtain their medications?: mail order, pay someone to   How many days a week do you miss medications?: never  Do you use a pill box or medication chart to help you manage your medications?: pill box (Comment: Home health nurse sets up in a pill box. )  Do you sometimes have difficulty refilling your medications?: no  Medication reconciliation completed?: No  Is medication adherence an area of need?: No  *Active  medication list was reviewed and reconciled with patient and/or caregiver:   Nutritional Status  Diet: low cholesterol, low sodium, low fat, Diabetic diet  Change in appetite?: no  Dentition: N/A (Comment: States some teeth are sensitive to cold and hot. )  Is nutrition an area of need?: no  Labs  Do you have regular lab work to monitor your medications?: yes  Type of lab work: A1c  Is lab adherence an area of need?: no  PHQ Depression Screen  Does patient's PHQ Depression Screening indicate a barrier to meeting self-care needs?: No (Comment: Pt scores an 18 on PHQ 9, but denies SI. )  Cognitive/Behavioral Health  Alert and oriented?: yes  Difficulty thinking?: no  Requires prompting?: no  Requires assistance for routine expression?: no  Is Cognitive/Behavioral health an area of need?: no  Culture/Rastafari  Does patient have cultural or Bahai beliefs that may impact ability to access healthcare?: no  Communication  Language preference: English   needed?: no  Hearing problems?: no  Decreased vision?: yes  Legally blind?: no  Vision assistance: glasses  Is Communication an area of need?: no  Health Literacy  Preferred learning method: reading materials  How often do you need to have someone help you read instructions, pamphlets, or other written material from your doctor or pharmacy?: occasionally  Is there a Health Literacy need?: no  Activities of Daily Living  Ambulation: assistance required  Dressing: assistance required  Bathing: assistance required  Transfers: assistance required  Toileting: assistance required  Feeding: independent  Cleaning home/chores: dependent  Telephone use: independent  Shopping/attending doctors' appointments: assistance required  Paying bills: independent  Taking meds: assistance required  Climbing stairs: dependent  Fall Risk  Patient mobility status: Ambulatory w/ assistance  Number of falls in the past 12 months: 2+  Fall risk?: Yes  Equipment/Current  Services  Equipment/supplies used in home: walker, cane, hearing or visual assistive devices, other (see comment), CPAP/BPAP machine (Comment: Nebulizer)  Current services: injections, home health nurse, physical therapy, transportation service  Is Equipment/Supplies/Services an area of need?: yes (Comment: Pt would like to see if he qualifies for a motorized scooter. )  Community & Government Programs  Support: none  [facility] Office of Aging and Adult Services: N/A  Community Resource Assessment  Based on the assessment of needs: Patient is in need of community resources  OPCM  consulted to assist with the following: other (see comment) (Comment: Housing modifications.)  Completion of Initial Assessment  Is the Initial Assessment Complete at this time?: yes         Problem List and History     Patient Active Problem List   Diagnosis    ED (erectile dysfunction)    Non-proliferative diabetic retinopathy, mild, both eyes    Hypertension associated with diabetes    Diabetic polyneuropathy    Nonobstructive coronary artery disease of native artery of native heart    Chronic combined systolic and diastolic HFrEF of 45%    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, with long-term current use of insulin    SANDRITA on CPAP    Moderate asthma    Psychophysiological insomnia    Nightmares    Sleep related gastroesophageal reflux disease    Inadequate sleep hygiene    PAF (paroxysmal atrial fibrillation)    At risk for medication noncompliance    Obesity (BMI 30.0-34.9)    Indeterminate stage chronic open angle glaucoma    Right hip pain    Gait instability    Chronic right shoulder pain    Arthritis    Left sided sciatica    Dyspnea on exertion    Osteoarthritis of spine with radiculopathy, lumbar region    HNP (herniated nucleus pulposus), lumbar    Gastroesophageal reflux disease without esophagitis    Chronic diastolic heart failure     Hypogonadism in male    Disorder of parathyroid gland    Hyperlipidemia associated with type 2 diabetes mellitus    JOSE MARIA (acute kidney injury)    LRTI (lower respiratory tract infection)    Physical deconditioning    Acute pain of left shoulder    Traumatic tear of left rotator cuff    Atrial fibrillation with RVR    Other chronic pain    Left shoulder pain    Complete tear of left rotator cuff    Stage 3 chronic kidney disease    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 associated with type 2 diabetes mellitus    Lumbar radiculopathy    Diabetic ulcer of toe of left foot associated with type 2 diabetes mellitus, with necrosis of muscle    Ulcer of left foot    Elevated troponin    Hypotension    Dermatomyositis       Reviewed Active Problem List with patient and/or Caregiver. The following were identified as areas of need: CHF and DM    Medical History:  Reviewed medical history with patient and/or caregiver    Social History:  Reviewed social history with patient and/or caregiver    Complex Care Plan    Care plan was discussed and completed today with input from patient and/or caregiver.      Patient Instructions     Instructions were provided via the [employer] patient resources and are available for the patient to view on the patient portal, if active.    Next steps: Discuss diet restrictionsand symptoms of exacerbation for CHF.  Dang Clay RN    No follow-ups on file.    Todays OPCM Self-Management Care Plan was developed with the patients/caregivers input and was based on identified barriers from todays assessment.  Goals were written today with the patient/caregiver and the patient has agreed to work towards these goals to improve his/her overall well-being. Patient verbalized understanding of the care plan, goals, and all of today's instructions. Encouraged patient/caregiver to communicate with his/her physician and health care team about health  conditions and the treatment plan.  Provided my contact information today and encouraged patient/caregiver to call me with any questions as needed.

## 2020-07-12 ENCOUNTER — NURSE TRIAGE (OUTPATIENT)
Dept: ADMINISTRATIVE | Facility: CLINIC | Age: 65
End: 2020-07-12

## 2020-07-12 NOTE — TELEPHONE ENCOUNTER
Patient scheduled to be contacted today on behalf of the Post Procedural Symptom Tracker. Patient admitted until post op day10. Per protocol, no additional contact required at this time.      Reason for Disposition   Caller has cancelled the call before the first contact    Additional Information   Negative: Caller is angry or rude (e.g., hangs up, verbally abusive, yelling)   Negative: Caller hangs up   Negative: Caller has already spoken with the PCP and has no further questions.   Negative: Caller has already spoken with another triager and has no further questions.   Negative: Caller has already spoken with another triager or PCP AND has further questions AND triager able to answer questions.   Negative: Busy signal.  First attempt to contact caller.  Follow-up call scheduled within 15 minutes.   Negative: No answer.  First attempt to contact caller.  Follow-up call scheduled within 15 minutes.   Negative: Message left on identified voice mail   Negative: Message left on unidentified voice mail.  Phone number verified.   Negative: Message left with person in household.   Negative: Wrong number reached.  Phone number verified.   Negative: Second attempt to contact family AND no contact made.  Phone number verified.   Negative: Cell phone out of range.  Phone number verified.   Negative: Pager number given.  Answering service notified.   Negative: Patient already left for the hospital/clinic.    Protocols used: NO CONTACT OR DUPLICATE CONTACT CALL-A-

## 2020-07-13 DIAGNOSIS — Z89.412 STATUS POST AMPUTATION OF LEFT GREAT TOE: Primary | ICD-10-CM

## 2020-07-13 RX ORDER — HYDROCODONE BITARTRATE AND ACETAMINOPHEN 7.5; 325 MG/1; MG/1
1 TABLET ORAL EVERY 6 HOURS PRN
Qty: 20 TABLET | Refills: 0 | Status: SHIPPED | OUTPATIENT
Start: 2020-07-13 | End: 2020-07-23 | Stop reason: SDUPTHER

## 2020-07-14 ENCOUNTER — OUTPATIENT CASE MANAGEMENT (OUTPATIENT)
Dept: ADMINISTRATIVE | Facility: OTHER | Age: 65
End: 2020-07-14

## 2020-07-15 ENCOUNTER — TELEPHONE (OUTPATIENT)
Dept: PODIATRY | Facility: CLINIC | Age: 65
End: 2020-07-15

## 2020-07-15 NOTE — TELEPHONE ENCOUNTER
Attempted to contact Pt in regards to him rescheduling a post-op appt. Pt unable to reach. Will try to f/u at later time.

## 2020-07-16 ENCOUNTER — EXTERNAL HOME HEALTH (OUTPATIENT)
Dept: HOME HEALTH SERVICES | Facility: HOSPITAL | Age: 65
End: 2020-07-16
Payer: MEDICARE

## 2020-07-16 ENCOUNTER — OUTPATIENT CASE MANAGEMENT (OUTPATIENT)
Dept: ADMINISTRATIVE | Facility: OTHER | Age: 65
End: 2020-07-16

## 2020-07-16 NOTE — PROGRESS NOTES
Outpatient Care Management  Plan of Care Follow Up Visit    Patient: Crow Green  MRN: [id number]  Date of Service: 07/16/2020  Completed by: Dang Clay RN  Referral Date: 06/29/2020  Program: Case Management (High Risk)    Reason for Visit   Patient presents with    Update Plan Of Care       Brief Summary: Phone contact with pt today. He reports home health has visited this AM and performed wound care to L great toe amputation wound. He is in severe pain at the time of this CM's contact and reports pain is >10 on 0-10 scale. He took a Norco about 20 minutes before this CM's call and reports by the end of the call that pain is about 5/10. He is not aware of Diab Ed appt today at this time and requests that this CM get it rescheduled. This CM sent a message to [facility]'s staff requesting appt be rescheduled. Discussed diet for control of DM and CHF and he advised he rec'd meals from "[employer] and he can't eat them. He reports they have no flavor or taste. Discussed low Na restrictions for CHF and he advised his last meal was air fried shrimp and a fresh tomato. Commended him for compliance with recommended intake and encouraged continued compliance with diab and heart healthy eating. His fasting blood sugar was 107 this AM. He reports his appetite has been poor recently related to severe amputation wound pain. Reviewed upcoming appt with podiatrist on 7/20/20 and he advised he already has transportation set up. Reviewed symptoms of infection to wound including copious or malodorous purulent drainage, increased pinkness, tenderness or warmth to periwound area. He advised home health nurse did not act concerned this morning when she did his dressing change. Advised this CM will follow up with him next week after his scheduled appts on 7/20/20 and 7/21/20. He voiced understanding and agreement with this plan for follow up.      Patient Summary     Involvement of Care:  Do I have permission to speak with  other family members about your care?   Yes, Izetta, relative    Patient Reported Labs & Vitals:  1.  Any Patient Reported Labs & Vitals?   Yes  2.  Patient Reported Blood Pressure:     3.  Patient Reported Pulse:     4.  Patient Reported Weight (Kg):     5.  Patient Reported Blood Glucose (mg/dl):   107    Medical and social history was reviewed with patient and/or caregiver.     Clinical Assessment     Reviewed and provided basic information on available community resources for mental health, transportation, wellness resources, and palliative care programs with patient and/or caregiver.     Complex Care Plan     Care plan was discussed and completed today with input from patient and/or caregiver.    Patient Instructions     Instructions were provided via the [email or web address] and are available for the patient to view on the patient portal.    Next Steps: Follow up next week. Review CHF and DM care plan tasks. Has he rec'd mailed literature and reviewed it? Dang Clay RN    No follow-ups on file.    Todays OPCM Self-Management Care Plan was developed with the patients/caregivers input and was based on identified barriers from todays assessment.  Goals were written today with the patient/caregiver and the patient has agreed to work towards these goals to improve his/her overall well-being. Patient verbalized understanding of the care plan, goals, and all of today's instructions. Encouraged patient/caregiver to communicate with his/her physician and health care team about health conditions and the treatment plan.  Provided my contact information today and encouraged patient/caregiver to call me with any questions as needed.

## 2020-07-17 ENCOUNTER — DOCUMENT SCAN (OUTPATIENT)
Dept: HOME HEALTH SERVICES | Facility: HOSPITAL | Age: 65
End: 2020-07-17
Payer: MEDICARE

## 2020-07-20 ENCOUNTER — PATIENT OUTREACH (OUTPATIENT)
Dept: ADMINISTRATIVE | Facility: OTHER | Age: 65
End: 2020-07-20

## 2020-07-21 ENCOUNTER — OFFICE VISIT (OUTPATIENT)
Dept: ORTHOPEDICS | Facility: CLINIC | Age: 65
End: 2020-07-21
Payer: MEDICARE

## 2020-07-21 ENCOUNTER — TELEPHONE (OUTPATIENT)
Dept: INTERNAL MEDICINE | Facility: CLINIC | Age: 65
End: 2020-07-21

## 2020-07-21 VITALS
DIASTOLIC BLOOD PRESSURE: 73 MMHG | SYSTOLIC BLOOD PRESSURE: 118 MMHG | BODY MASS INDEX: 33.76 KG/M2 | HEART RATE: 86 BPM | HEIGHT: 65 IN | WEIGHT: 202.63 LBS

## 2020-07-21 DIAGNOSIS — L97.523 DIABETIC ULCER OF TOE OF LEFT FOOT ASSOCIATED WITH TYPE 2 DIABETES MELLITUS, WITH NECROSIS OF MUSCLE: ICD-10-CM

## 2020-07-21 DIAGNOSIS — E11.59 HYPERTENSION ASSOCIATED WITH DIABETES: Chronic | ICD-10-CM

## 2020-07-21 DIAGNOSIS — I15.2 HYPERTENSION ASSOCIATED WITH DIABETES: Chronic | ICD-10-CM

## 2020-07-21 DIAGNOSIS — E11.621 DIABETIC ULCER OF TOE OF LEFT FOOT ASSOCIATED WITH TYPE 2 DIABETES MELLITUS, WITH NECROSIS OF MUSCLE: ICD-10-CM

## 2020-07-21 DIAGNOSIS — M75.122 COMPLETE TEAR OF LEFT ROTATOR CUFF, UNSPECIFIED WHETHER TRAUMATIC: Primary | ICD-10-CM

## 2020-07-21 PROCEDURE — 3008F BODY MASS INDEX DOCD: CPT | Mod: HCNC,CPTII,S$GLB, | Performed by: FAMILY MEDICINE

## 2020-07-21 PROCEDURE — 99499 RISK ADDL DX/OHS AUDIT: ICD-10-PCS | Mod: S$PBB,,, | Performed by: FAMILY MEDICINE

## 2020-07-21 PROCEDURE — 3078F PR MOST RECENT DIASTOLIC BLOOD PRESSURE < 80 MM HG: ICD-10-PCS | Mod: HCNC,CPTII,S$GLB, | Performed by: FAMILY MEDICINE

## 2020-07-21 PROCEDURE — 99999 PR PBB SHADOW E&M-EST. PATIENT-LVL IV: ICD-10-PCS | Mod: PBBFAC,HCNC,, | Performed by: FAMILY MEDICINE

## 2020-07-21 PROCEDURE — 3074F SYST BP LT 130 MM HG: CPT | Mod: HCNC,CPTII,S$GLB, | Performed by: FAMILY MEDICINE

## 2020-07-21 PROCEDURE — 3078F DIAST BP <80 MM HG: CPT | Mod: HCNC,CPTII,S$GLB, | Performed by: FAMILY MEDICINE

## 2020-07-21 PROCEDURE — 99214 PR OFFICE/OUTPT VISIT, EST, LEVL IV, 30-39 MIN: ICD-10-PCS | Mod: 25,HCNC,S$GLB, | Performed by: FAMILY MEDICINE

## 2020-07-21 PROCEDURE — 3008F PR BODY MASS INDEX (BMI) DOCUMENTED: ICD-10-PCS | Mod: HCNC,CPTII,S$GLB, | Performed by: FAMILY MEDICINE

## 2020-07-21 PROCEDURE — 3051F PR MOST RECENT HEMOGLOBIN A1C LEVEL 7.0 - < 8.0%: ICD-10-PCS | Mod: HCNC,CPTII,S$GLB, | Performed by: FAMILY MEDICINE

## 2020-07-21 PROCEDURE — 3074F PR MOST RECENT SYSTOLIC BLOOD PRESSURE < 130 MM HG: ICD-10-PCS | Mod: HCNC,CPTII,S$GLB, | Performed by: FAMILY MEDICINE

## 2020-07-21 PROCEDURE — 99999 PR PBB SHADOW E&M-EST. PATIENT-LVL IV: CPT | Mod: PBBFAC,HCNC,, | Performed by: FAMILY MEDICINE

## 2020-07-21 PROCEDURE — 99214 OFFICE O/P EST MOD 30 MIN: CPT | Mod: 25,HCNC,S$GLB, | Performed by: FAMILY MEDICINE

## 2020-07-21 PROCEDURE — 20610 LARGE JOINT ASPIRATION/INJECTION: L SUBACROMIAL BURSA: ICD-10-PCS | Mod: HCNC,LT,S$GLB, | Performed by: FAMILY MEDICINE

## 2020-07-21 PROCEDURE — 99499 UNLISTED E&M SERVICE: CPT | Mod: S$PBB,,, | Performed by: FAMILY MEDICINE

## 2020-07-21 PROCEDURE — 3051F HG A1C>EQUAL 7.0%<8.0%: CPT | Mod: HCNC,CPTII,S$GLB, | Performed by: FAMILY MEDICINE

## 2020-07-21 PROCEDURE — 20610 DRAIN/INJ JOINT/BURSA W/O US: CPT | Mod: HCNC,LT,S$GLB, | Performed by: FAMILY MEDICINE

## 2020-07-21 RX ORDER — BETAMETHASONE SODIUM PHOSPHATE AND BETAMETHASONE ACETATE 3; 3 MG/ML; MG/ML
6 INJECTION, SUSPENSION INTRA-ARTICULAR; INTRALESIONAL; INTRAMUSCULAR; SOFT TISSUE
Status: DISCONTINUED | OUTPATIENT
Start: 2020-07-21 | End: 2020-07-21 | Stop reason: HOSPADM

## 2020-07-21 RX ADMIN — BETAMETHASONE SODIUM PHOSPHATE AND BETAMETHASONE ACETATE 6 MG: 3; 3 INJECTION, SUSPENSION INTRA-ARTICULAR; INTRALESIONAL; INTRAMUSCULAR; SOFT TISSUE at 01:07

## 2020-07-21 NOTE — PROCEDURES
Large Joint Aspiration/Injection: L subacromial bursa    Date/Time: 7/21/2020 1:00 PM  Performed by: Cisco Luciano MD  Authorized by: Cisco Luciano MD     Indications:  Pain  Site marked: the procedure site was marked    Timeout: prior to procedure the correct patient, procedure, and site was verified    Prep: patient was prepped and draped in usual sterile fashion    Approach:  Posterior  Location:  Shoulder  Site:  L subacromial bursa  Medications:  6 mg betamethasone acetate-betamethasone sodium phosphate 6 mg/mL  Patient tolerance:  Patient tolerated the procedure well with no immediate complications

## 2020-07-21 NOTE — PROGRESS NOTES
Subjective:     Patient ID: Crow Green is a 65 y.o. male.    Chief Complaint: Pain of the Left Shoulder      HPI:  Patient states that he is had severe pain left shoulder for the last few months son resolved by over-the-counter medication and that he needs help with a cortisone injection today.  He states his blood sugar is well controlled and was 118 this morning.    He is trying to do a better job of controlling his blood sugar specially since he had to have his great toe amputated recently because of apparently infection and related to diabetes and diabetic neuropathy because he never felt any pain in the toe he says.    He does not know of a specific injury to the left shoulder but this has been ongoing problem and he has documented rotator cuff tears not amenable to surgical repair at this time.    Past Medical History:   Diagnosis Date    Arthritis     Asthma     Atrial fibrillation     Atrial fibrillation with RVR 8/7/2019    CHF (congestive heart failure)     Depression     Dermatomyositis     Diabetes mellitus, type 2 Diagnosed in 2000    Elevated PSA     Hypertension     Sleep apnea      Past Surgical History:   Procedure Laterality Date    ABLATION OF ARRHYTHMOGENIC FOCUS FOR ATRIAL FIBRILLATION N/A 2/27/2020    Procedure: Ablation atrial fibrillation;  Surgeon: Gio Brown MD;  Location: [facility] EP LAB;  Service: Cardiology;  Laterality: N/A;  afib, DAMIEN (cx if SR), PVI, PITO, anes, MB, 3 Prep    CHOLECYSTECTOMY      CLOSURE OF WOUND Left 7/1/2020    Procedure: CLOSURE, WOUND;  Surgeon: Barb Veras DPM;  Location: [facility] OR;  Service: Podiatry;  Laterality: Left;    SELECTIVE INJECTION OF ANESTHETIC AGENT AROUND LUMBAR SPINAL NERVE ROOT BY TRANSFORAMINAL APPROACH Left 6/11/2020    Procedure: BLOCK, SPINAL NERVE ROOT, LUMBAR, SELECTIVE, TRANSFORAMINAL APPROACH Left L4-5, L5-S1 TFESI with RN IV sedation;  Surgeon: Dillan Tsai MD;  Location: [facility] PAIN MGT;  Service: Pain  Management;  Laterality: Left;    TOE AMPUTATION Left 6/29/2020    Procedure: AMPUTATION, TOE;  Surgeon: Barb Veras DPM;  Location: [facility];  Service: Podiatry;  Laterality: Left;  great toe     Family History   Problem Relation Age of Onset    Glaucoma Mother     Cataracts Mother     Cataracts Father     Glaucoma Sister     Cataracts Sister     Glaucoma Maternal Aunt     No Known Problems Brother     No Known Problems Daughter     No Known Problems Son     Prostate cancer Neg Hx      Social History     Socioeconomic History    Marital status: Legally      Spouse name: Not on file    Number of children: 5    Years of education: Not on file    Highest education level: Not on file   Occupational History    Occupation: disabled   Social Needs    Financial resource strain: Not on file    Food insecurity     Worry: Not on file     Inability: Not on file    Transportation needs     Medical: Yes     Non-medical: Not on file   Tobacco Use    Smoking status: Never Smoker    Smokeless tobacco: Never Used   Substance and Sexual Activity    Alcohol use: No     Comment: occ    Drug use: No    Sexual activity: Yes     Partners: Female   Lifestyle    Physical activity     Days per week: Not on file     Minutes per session: Not on file    Stress: Rather much   Relationships    Social connections     Talks on phone: Not on file     Gets together: Not on file     Attends Spiritism service: Not on file     Active member of club or organization: Not on file     Attends meetings of clubs or organizations: Not on file     Relationship status: Not on file   Other Topics Concern    Not on file   Social History Narrative    Not on file       Current Outpatient Medications:     ALCOHOL ANTISEPTIC PADS (ALCOHOL PREP PADS TOP), , Disp: , Rfl:     allopurinoL (ZYLOPRIM) 100 MG tablet, Take 1 tablet (100 mg total) by mouth once daily., Disp: 30 tablet, Rfl: 0    atorvastatin (LIPITOR) 40 MG tablet,  TAKE 1 TABLET EVERY EVENING, Disp: 90 tablet, Rfl: 4    azaTHIOprine (IMURAN) 50 mg Tab, Take 1 tablet (50 mg total) by mouth once daily., Disp: 90 tablet, Rfl: 0    baclofen (LIORESAL) 10 MG tablet, Take 1 tablet (10 mg total) by mouth once daily., Disp: 90 tablet, Rfl: 3    blood sugar diagnostic (TRUE METRIX GLUCOSE TEST STRIP) Strp, Use with blood glucose meter kit to test blood sugar four times  daily, Disp: 200 strip, Rfl: 99    colchicine (COLCRYS) 0.6 mg tablet, Take 1 tablet (0.6 mg total) by mouth once daily., Disp: 30 tablet, Rfl: 0    diclofenac sodium (VOLTAREN) 1 % Gel, Apply 2 g topically 4 (four) times daily., Disp: 1 Tube, Rfl: 5    diltiaZEM (CARDIZEM SR) 60 MG Cp12, Take 1 capsule (60 mg total) by mouth 2 (two) times daily., Disp: 60 capsule, Rfl: 11    ELIQUIS 5 mg Tab, Take 1 tablet (5 mg total) by mouth 2 (two) times daily., Disp: 60 tablet, Rfl: 0    fluticasone-salmeterol diskus inhaler 250-50 mcg, Inhale 1 puff into the lungs 2 (two) times daily. Controller, Disp: 60 each, Rfl: 11    food supplemt, lactose-reduced (ENSURE) Liqd, Take 118 mLs by mouth 3 (three) times daily with meals., Disp: 1 Bottle, Rfl: 4    furosemide (LASIX) 40 MG tablet, TAKE 2 TABLETS EVERY MORNING  AND TAKE 1 TABLET EVERY EVENING, Disp: 270 tablet, Rfl: 0    gabapentin (NEURONTIN) 800 MG tablet, Take 1 tablet (800 mg total) by mouth 3 (three) times daily., Disp: 270 tablet, Rfl: 4    glimepiride (AMARYL) 2 MG tablet, TAKE 1 TABLET (2 MG TOTAL) BY MOUTH BEFORE BREAKFAST., Disp: 90 tablet, Rfl: 3    HYDROcodone-acetaminophen (NORCO) 7.5-325 mg per tablet, Take 1 tablet by mouth every 6 (six) hours as needed for Pain., Disp: 20 tablet, Rfl: 0    inhalation spacing device, Use as directed for inhalation., Disp: 1 Device, Rfl: 0    insulin (LANTUS SOLOSTAR U-100 INSULIN) glargine 100 units/mL (3mL) SubQ pen, INJECT 35 UNITS SUBCUTANEOUSLY EVERY EVENING, Disp: 75 mL, Rfl: 4    insulin aspart U-100 (NOVOLOG  "FLEXPEN U-100 INSULIN) 100 unit/mL (3 mL) InPn pen, INJECT 10 UNITS SUBCUTANEOUSLY THREE TIMES DAILY WITH MEALS, Disp: 15 mL, Rfl: 0    insulin syringe-needle U-100 1 mL 31 gauge x 5/16 Syrg, , Disp: , Rfl:     ipratropium (ATROVENT) 0.02 % nebulizer solution, USE 1 VIAL VIA NEBULIZER FOUR TIMES DAILY, Disp: 937.5 mL, Rfl: 2    isosorbide mononitrate (IMDUR) 60 MG 24 hr tablet, Take 1 tablet (60 mg total) by mouth once daily., Disp: 90 tablet, Rfl: 3    lancets 32 gauge Misc, 1 lancet by Misc.(Non-Drug; Combo Route) route 2 (two) times daily., Disp: 100 each, Rfl: 11    latanoprost 0.005 % ophthalmic solution, INSTILL 1 DROP INTO BOTH EYES EVERY EVENING, Disp: 1 Bottle, Rfl: 2    magnesium oxide (MAG-OX) 400 mg (241.3 mg magnesium) tablet, Take 1 tablet (400 mg total) by mouth once daily., Disp: , Rfl: 0    metFORMIN (GLUCOPHAGE-XR) 500 MG XR 24hr tablet, Take 2 tablets (1,000 mg total) by mouth 2 (two) times daily with meals., Disp: 360 tablet, Rfl: 3    metoprolol succinate (TOPROL-XL) 50 MG 24 hr tablet, Take 1 tablet (50 mg total) by mouth once daily., Disp: 90 tablet, Rfl: 3    nitroGLYCERIN (NITROSTAT) 0.3 MG SL tablet, PLACE 1 TABLET UNDER THE TONGUE EVERY 5 MINUTES AS NEEDED FOR CHEST PAIN, NO MORE THAN 3 TABLETS IN ONE DAY, Disp: 100 tablet, Rfl: 0    OZEMPIC 1 mg/dose (2 mg/1.5 mL) PnIj, , Disp: , Rfl:     pantoprazole (PROTONIX) 40 MG tablet, Take 1 tablet (40 mg total) by mouth once daily., Disp: 30 tablet, Rfl: 11    pen needle, diabetic (BD ULTRA-FINE SHORT PEN NEEDLE) 31 gauge x 5/16" Ndle, Inject 1 each into the skin 3 (three) times daily., Disp: 100 each, Rfl: 11    predniSONE (DELTASONE) 5 MG tablet, Take 1 tablet (5 mg total) by mouth 2 (two) times daily., Disp: 60 tablet, Rfl: 0    sertraline (ZOLOFT) 100 MG tablet, Take 1 tablet (100 mg total) by mouth every evening., Disp: 90 tablet, Rfl: 4    silver sulfADIAZINE 1% (SILVADENE) 1 % cream, Apply topically 2 (two) times daily., " Disp: 50 g, Rfl: 0    tamsulosin (FLOMAX) 0.4 mg Cap, Take 1 capsule (0.4 mg total) by mouth once daily., Disp: 30 capsule, Rfl: 11    traZODone (DESYREL) 100 MG tablet, Take 2 tablets (200 mg total) by mouth every evening., Disp: 180 tablet, Rfl: 1    VIOS AEROSOL DELIVERY SYSTEM Shefali, USE AS DIRESTED, Disp: , Rfl: 0    aspirin (ECOTRIN) 81 MG EC tablet, Take 1 tablet (81 mg total) by mouth once daily., Disp: , Rfl: 0    blood-glucose meter (TRUE METRIX AIR GLUCOSE METER) kit, TEST FOUR TIMES DAILY BEFORE MEALS  AND EVERY NIGHT, Disp: 1 each, Rfl: 0    lisinopril 10 MG tablet, Take 1 tablet (10 mg total) by mouth once daily., Disp: 90 tablet, Rfl: 3    tiotropium (SPIRIVA WITH HANDIHALER) 18 mcg inhalation capsule, Inhale 1 capsule (18 mcg total) into the lungs once daily. Controller, Disp: 90 capsule, Rfl: 0    Current Facility-Administered Medications:     betamethasone acetate-betamethasone sodium phosphate injection 6 mg, 6 mg, Intra-articular, , Cisco Luciano MD, 6 mg at 07/21/20 1300  Review of patient's allergies indicates:   Allergen Reactions    Protamine Hives     Urticaria, possible upper airway swelling     Review of Systems   Constitutional: Negative for chills, fever and weight loss.   Respiratory: Negative for shortness of breath.    Cardiovascular: Negative for chest pain and palpitations.       Objective:   Body mass index is 33.71 kg/m².  Vitals:    07/21/20 1324   BP: 118/73   Pulse: 86           Ortho/SPM Exam-alert and oriented well-nourished well-developed ambulatory with walking shoe own and in no acute distress    Respiratory effort is normal    Left shoulder-no acute deformity but some generalized atrophy noted    Tender to palpation along the AC joint and lateral deltoid region    Patient is limited abduction to 70° and flexion to 80°.    He cannot reach behind his low back or behind his head    Cannot maintain empty can position    Strength is 3/5    Neurovascular  intact    Psychiatric good affect and cognition    IMAGING: X-Ray Chest 1 View  Narrative: EXAMINATION:  XR CHEST 1 VIEW    CLINICAL HISTORY:  fluid overload;    TECHNIQUE:  Single frontal view of the chest was performed.    COMPARISON:  Comparison 06/26/2020    FINDINGS:  Heart sizes upper limits for normal.  Mild atelectasis right lower lobe.  No pleural effusion or pneumothorax.  Bones appear intact.  Aorta demonstrates atherosclerotic disease.  In comparison to the prior study, there is no adverse interval changes  Impression: In comparison to the prior study, there is no adverse interval changes    Electronically signed by: Devon Singleton MD  Date:    07/08/2020  Time:    07:25       Radiographs / Imaging : Relevant imaging results reviewed by me and interpreted by me, discussed with the patient and / or family -we discussed his previous results from MRI and shoulder x-rays including tendinitis and tendon tear is a and arthritic changes.          Assessment:     Encounter Diagnoses   Name Primary?    Complete tear of left rotator cuff, unspecified whether traumatic Yes    Diabetic ulcer of toe of left foot associated with type 2 diabetes mellitus, with necrosis of muscle     Hypertension associated with diabetes         Plan:   Complete tear of left rotator cuff, unspecified whether traumatic  -     Large Joint Aspiration/Injection: L subacromial bursa  -     betamethasone acetate-betamethasone sodium phosphate injection 6 mg    Diabetic ulcer of toe of left foot associated with type 2 diabetes mellitus, with necrosis of muscle    Hypertension associated with diabetes        The patient verbalized good understanding of the medical issues discussed today and expressed appreciation for the care provided.  Patient was given the opportunity to ask questions and be an active participant in their medical care. Patient had no further questions or concerns at this time.     The patient was encouraged to participate  in appropriate physical activity.      Cisco Luciano M.D.  [facility]        This note was dictated using voice recognition software and may have sound a like errors.

## 2020-07-21 NOTE — TELEPHONE ENCOUNTER
----- Message from Mel Carrasco sent at 7/21/2020 12:50 PM CDT -----  Regarding: pt advice  Contact: Brigette flores/nurse for [facility] [phone] called to consult with nurse to verify if she can get np home visit for pt do to a lack of transportation. Please return call to Brigette domínguez

## 2020-07-21 NOTE — PATIENT INSTRUCTIONS
Ice to left shoulder 3 times a day as needed for 15 min    Follow-up with primary care and take care of blood pressure and blood sugar and general medical issues

## 2020-07-21 NOTE — TELEPHONE ENCOUNTER
Spoke with Brigette from [facility] patient has a podiatry and brittnee with Dr. Perez on 7/23/2020 however has cx a few appts due to transportation issues.They are requesting an order for their Nurse Practitioner to start seeing patient at his home. Will speak to MD. Murcia

## 2020-07-21 NOTE — TELEPHONE ENCOUNTER
Informed Brigette to let us know which order/ referral is needed for the np to go see patient. She states they will be sending us info through epic about what to order. Fc

## 2020-07-22 ENCOUNTER — OUTPATIENT CASE MANAGEMENT (OUTPATIENT)
Dept: ADMINISTRATIVE | Facility: OTHER | Age: 65
End: 2020-07-22
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
## 2020-07-22 NOTE — LETTER
July 22, 2020    Crow Jamallion  [address]  [facility] [address]             [facility] [address] [place] [address] Dear Mr. Green:    I have enclosed LTC-Personal Services, advanced care planning as discussed via telephone today. I am also including rural home sam.  I hope this will be helpful.    If any additional needs arise, please contact me at [phone].    Sincerely,        Amanda Whipple LCSW

## 2020-07-23 ENCOUNTER — OFFICE VISIT (OUTPATIENT)
Dept: INTERNAL MEDICINE | Facility: CLINIC | Age: 65
End: 2020-07-23
Payer: MEDICARE

## 2020-07-23 ENCOUNTER — LAB VISIT (OUTPATIENT)
Dept: LAB | Facility: HOSPITAL | Age: 65
End: 2020-07-23
Attending: FAMILY MEDICINE
Payer: MEDICARE

## 2020-07-23 ENCOUNTER — OFFICE VISIT (OUTPATIENT)
Dept: PODIATRY | Facility: CLINIC | Age: 65
End: 2020-07-23
Payer: MEDICARE

## 2020-07-23 VITALS
TEMPERATURE: 99 F | BODY MASS INDEX: 32.83 KG/M2 | HEART RATE: 90 BPM | HEIGHT: 65 IN | DIASTOLIC BLOOD PRESSURE: 76 MMHG | OXYGEN SATURATION: 97 % | RESPIRATION RATE: 18 BRPM | WEIGHT: 197.06 LBS | SYSTOLIC BLOOD PRESSURE: 128 MMHG

## 2020-07-23 VITALS
BODY MASS INDEX: 33.66 KG/M2 | DIASTOLIC BLOOD PRESSURE: 72 MMHG | WEIGHT: 202 LBS | SYSTOLIC BLOOD PRESSURE: 138 MMHG | HEART RATE: 86 BPM | HEIGHT: 65 IN

## 2020-07-23 DIAGNOSIS — Z12.5 ENCOUNTER FOR PROSTATE CANCER SCREENING: ICD-10-CM

## 2020-07-23 DIAGNOSIS — E11.49 TYPE II DIABETES MELLITUS WITH NEUROLOGICAL MANIFESTATIONS: ICD-10-CM

## 2020-07-23 DIAGNOSIS — Z23 ENCOUNTER FOR ADMINISTRATION OF VACCINE: Primary | ICD-10-CM

## 2020-07-23 DIAGNOSIS — Z89.412 STATUS POST AMPUTATION OF LEFT GREAT TOE: Primary | ICD-10-CM

## 2020-07-23 DIAGNOSIS — E11.621 DIABETIC ULCER OF TOE OF LEFT FOOT ASSOCIATED WITH TYPE 2 DIABETES MELLITUS, WITH NECROSIS OF MUSCLE: ICD-10-CM

## 2020-07-23 DIAGNOSIS — T81.31XA WOUND DEHISCENCE, SURGICAL, INITIAL ENCOUNTER: ICD-10-CM

## 2020-07-23 DIAGNOSIS — L97.523 DIABETIC ULCER OF TOE OF LEFT FOOT ASSOCIATED WITH TYPE 2 DIABETES MELLITUS, WITH NECROSIS OF MUSCLE: ICD-10-CM

## 2020-07-23 DIAGNOSIS — M33.13 DERMATOMYOSITIS: ICD-10-CM

## 2020-07-23 LAB
ANION GAP SERPL CALC-SCNC: 10 MMOL/L (ref 8–16)
BUN SERPL-MCNC: 20 MG/DL (ref 8–23)
CALCIUM SERPL-MCNC: 9.2 MG/DL (ref 8.7–10.5)
CHLORIDE SERPL-SCNC: 104 MMOL/L (ref 95–110)
CO2 SERPL-SCNC: 27 MMOL/L (ref 23–29)
CREAT SERPL-MCNC: 1.1 MG/DL (ref 0.5–1.4)
EST. GFR  (AFRICAN AMERICAN): >60 ML/MIN/1.73 M^2
EST. GFR  (NON AFRICAN AMERICAN): >60 ML/MIN/1.73 M^2
GLUCOSE SERPL-MCNC: 83 MG/DL (ref 70–110)
POTASSIUM SERPL-SCNC: 4.4 MMOL/L (ref 3.5–5.1)
SODIUM SERPL-SCNC: 141 MMOL/L (ref 136–145)

## 2020-07-23 PROCEDURE — 90715 TDAP VACCINE 7 YRS/> IM: CPT | Mod: HCNC,S$GLB,, | Performed by: FAMILY MEDICINE

## 2020-07-23 PROCEDURE — 36415 COLL VENOUS BLD VENIPUNCTURE: CPT | Mod: HCNC,PO

## 2020-07-23 PROCEDURE — 84153 ASSAY OF PSA TOTAL: CPT | Mod: HCNC

## 2020-07-23 PROCEDURE — 90472 IMMUNIZATION ADMIN EACH ADD: CPT | Mod: HCNC,S$GLB,, | Performed by: FAMILY MEDICINE

## 2020-07-23 PROCEDURE — 99999 PR PBB SHADOW E&M-EST. PATIENT-LVL V: CPT | Mod: PBBFAC,HCNC,, | Performed by: PODIATRIST

## 2020-07-23 PROCEDURE — 99024 PR POST-OP FOLLOW-UP VISIT: ICD-10-PCS | Mod: HCNC,S$GLB,, | Performed by: PODIATRIST

## 2020-07-23 PROCEDURE — 99999 PR PBB SHADOW E&M-EST. PATIENT-LVL V: ICD-10-PCS | Mod: PBBFAC,HCNC,, | Performed by: PODIATRIST

## 2020-07-23 PROCEDURE — 90472 TDAP VACCINE GREATER THAN OR EQUAL TO 7YO IM: ICD-10-PCS | Mod: HCNC,S$GLB,, | Performed by: FAMILY MEDICINE

## 2020-07-23 PROCEDURE — 99214 PR OFFICE/OUTPT VISIT, EST, LEVL IV, 30-39 MIN: ICD-10-PCS | Mod: 25,HCNC,S$GLB, | Performed by: FAMILY MEDICINE

## 2020-07-23 PROCEDURE — 3078F PR MOST RECENT DIASTOLIC BLOOD PRESSURE < 80 MM HG: ICD-10-PCS | Mod: HCNC,CPTII,S$GLB, | Performed by: FAMILY MEDICINE

## 2020-07-23 PROCEDURE — 3078F DIAST BP <80 MM HG: CPT | Mod: HCNC,CPTII,S$GLB, | Performed by: FAMILY MEDICINE

## 2020-07-23 PROCEDURE — 99024 POSTOP FOLLOW-UP VISIT: CPT | Mod: HCNC,S$GLB,, | Performed by: PODIATRIST

## 2020-07-23 PROCEDURE — 90715 TDAP VACCINE GREATER THAN OR EQUAL TO 7YO IM: ICD-10-PCS | Mod: HCNC,S$GLB,, | Performed by: FAMILY MEDICINE

## 2020-07-23 PROCEDURE — 80048 BASIC METABOLIC PNL TOTAL CA: CPT | Mod: HCNC,PO

## 2020-07-23 PROCEDURE — 99214 OFFICE O/P EST MOD 30 MIN: CPT | Mod: 25,HCNC,S$GLB, | Performed by: FAMILY MEDICINE

## 2020-07-23 PROCEDURE — 90471 IMMUNIZATION ADMIN: CPT | Mod: HCNC,S$GLB,, | Performed by: FAMILY MEDICINE

## 2020-07-23 PROCEDURE — 99999 PR PBB SHADOW E&M-EST. PATIENT-LVL V: ICD-10-PCS | Mod: PBBFAC,HCNC,, | Performed by: FAMILY MEDICINE

## 2020-07-23 PROCEDURE — 3074F PR MOST RECENT SYSTOLIC BLOOD PRESSURE < 130 MM HG: ICD-10-PCS | Mod: HCNC,CPTII,S$GLB, | Performed by: FAMILY MEDICINE

## 2020-07-23 PROCEDURE — 99999 PR PBB SHADOW E&M-EST. PATIENT-LVL V: CPT | Mod: PBBFAC,HCNC,, | Performed by: FAMILY MEDICINE

## 2020-07-23 PROCEDURE — 3008F PR BODY MASS INDEX (BMI) DOCUMENTED: ICD-10-PCS | Mod: HCNC,CPTII,S$GLB, | Performed by: FAMILY MEDICINE

## 2020-07-23 PROCEDURE — 90750 HZV VACC RECOMBINANT IM: CPT | Mod: HCNC,S$GLB,, | Performed by: FAMILY MEDICINE

## 2020-07-23 PROCEDURE — 3008F BODY MASS INDEX DOCD: CPT | Mod: HCNC,CPTII,S$GLB, | Performed by: FAMILY MEDICINE

## 2020-07-23 PROCEDURE — 3051F PR MOST RECENT HEMOGLOBIN A1C LEVEL 7.0 - < 8.0%: ICD-10-PCS | Mod: HCNC,CPTII,S$GLB, | Performed by: FAMILY MEDICINE

## 2020-07-23 PROCEDURE — 3074F SYST BP LT 130 MM HG: CPT | Mod: HCNC,CPTII,S$GLB, | Performed by: FAMILY MEDICINE

## 2020-07-23 PROCEDURE — 90471 ZOSTER RECOMBINANT VACCINE: ICD-10-PCS | Mod: HCNC,S$GLB,, | Performed by: FAMILY MEDICINE

## 2020-07-23 PROCEDURE — 3051F HG A1C>EQUAL 7.0%<8.0%: CPT | Mod: HCNC,CPTII,S$GLB, | Performed by: FAMILY MEDICINE

## 2020-07-23 PROCEDURE — 90750 ZOSTER RECOMBINANT VACCINE: ICD-10-PCS | Mod: HCNC,S$GLB,, | Performed by: FAMILY MEDICINE

## 2020-07-23 RX ORDER — AZATHIOPRINE 50 MG/1
50 TABLET ORAL DAILY
Qty: 90 TABLET | Refills: 0 | Status: SHIPPED | OUTPATIENT
Start: 2020-07-23 | End: 2020-08-21 | Stop reason: SDUPTHER

## 2020-07-23 RX ORDER — HYDROCODONE BITARTRATE AND ACETAMINOPHEN 7.5; 325 MG/1; MG/1
1 TABLET ORAL EVERY 6 HOURS PRN
Qty: 20 TABLET | Refills: 0 | Status: SHIPPED | OUTPATIENT
Start: 2020-07-23 | End: 2020-08-10 | Stop reason: DRUGHIGH

## 2020-07-23 RX ORDER — SULFAMETHOXAZOLE AND TRIMETHOPRIM 800; 160 MG/1; MG/1
1 TABLET ORAL 2 TIMES DAILY
Qty: 20 TABLET | Refills: 1 | Status: SHIPPED | OUTPATIENT
Start: 2020-07-23 | End: 2020-08-02

## 2020-07-23 NOTE — ASSESSMENT & PLAN NOTE
-s/p left great toe amputation on 6/29/20. Had appt with podiatry today  -continue home physical therapy  -reports compliance with diabetes medications.  Last A1c 7.7.  Will schedule follow-up with diabetes educator.  Appears that his home blood glucose readings are much improved

## 2020-07-23 NOTE — LETTER
07/23/2020                 [facility]  [address] [facility]  [place] [address]  Phone: [phone]  Fax: [phone]   07/23/2020    Patient: Crow Green   YOB: 1955   Date of Visit: 7/23/2020       To Whom it May Concern:    Crow Green was seen in my clinic on 7/23/2020. He may return to work on 7/27/2020.    If you have any questions or concerns, please don't hesitate to call.    Sincerely,         MONTY Cuello MA

## 2020-07-23 NOTE — PROGRESS NOTES
Subjective:     Patient ID: Crow Green is a 65 y.o. male.    Chief Complaint: Post-op Evaluation (left foot. rates pain 8/10. wears post op shoe with socks. diabetic Pt. PCP Dr. Lambert)    HPI: This 65 year old male returns to the clinic 3 weeks status post left hallux amputation procedure. Patient has no complaints of fever chills or sweats. Positive pain. Patient states dressing was kept dry, clean, and intact. Patient states transportation issues is what kept him from his appointments. Patient states home health has been coming out. Patient also states he completed the antibiotics as prescribed. Patient has no other pedal complaints today.     PCP: Dr. Lambert (723/2020)       Patient Active Problem List   Diagnosis    ED (erectile dysfunction)    Non-proliferative diabetic retinopathy, mild, both eyes    Hypertension associated with diabetes    Diabetic polyneuropathy    Nonobstructive coronary artery disease of native artery of native heart    Chronic combined systolic and diastolic HFrEF of 45%    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, with long-term current use of insulin    SANDRITA on CPAP    Moderate asthma    Psychophysiological insomnia    Nightmares    Sleep related gastroesophageal reflux disease    Inadequate sleep hygiene    PAF (paroxysmal atrial fibrillation)    At risk for medication noncompliance    Obesity (BMI 30.0-34.9)    Indeterminate stage chronic open angle glaucoma    Right hip pain    Gait instability    Chronic right shoulder pain    Arthritis    Left sided sciatica    Dyspnea on exertion    Osteoarthritis of spine with radiculopathy, lumbar region    HNP (herniated nucleus pulposus), lumbar    Gastroesophageal reflux disease without esophagitis    Chronic diastolic heart failure    Hypogonadism in male    Disorder of parathyroid gland    Hyperlipidemia associated with type 2 diabetes mellitus     JOSE MARIA (acute kidney injury)    LRTI (lower respiratory tract infection)    Physical deconditioning    Acute pain of left shoulder    Traumatic tear of left rotator cuff    Atrial fibrillation with RVR    Other chronic pain    Left shoulder pain    Complete tear of left rotator cuff    Stage 3 chronic kidney disease    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 associated with type 2 diabetes mellitus    Lumbar radiculopathy    Diabetic ulcer of toe of left foot associated with type 2 diabetes mellitus, with necrosis of muscle    Ulcer of left foot    Elevated troponin    Hypotension    Dermatomyositis       Medication List with Changes/Refills   New Medications    SULFAMETHOXAZOLE-TRIMETHOPRIM 800-160MG (BACTRIM DS) 800-160 MG TAB    Take 1 tablet by mouth 2 (two) times daily. for 10 days   Current Medications    ALCOHOL ANTISEPTIC PADS (ALCOHOL PREP PADS TOP)        ALLOPURINOL (ZYLOPRIM) 100 MG TABLET    Take 1 tablet (100 mg total) by mouth once daily.    ASPIRIN (ECOTRIN) 81 MG EC TABLET    Take 1 tablet (81 mg total) by mouth once daily.    ATORVASTATIN (LIPITOR) 40 MG TABLET    TAKE 1 TABLET EVERY EVENING    BACLOFEN (LIORESAL) 10 MG TABLET    Take 1 tablet (10 mg total) by mouth once daily.    BLOOD SUGAR DIAGNOSTIC (TRUE METRIX GLUCOSE TEST STRIP) STRP    Use with blood glucose meter kit to test blood sugar four times  daily    BLOOD-GLUCOSE METER (TRUE METRIX AIR GLUCOSE METER) KIT    TEST FOUR TIMES DAILY BEFORE MEALS  AND EVERY NIGHT    COLCHICINE (COLCRYS) 0.6 MG TABLET    Take 1 tablet (0.6 mg total) by mouth once daily.    DICLOFENAC SODIUM (VOLTAREN) 1 % GEL    Apply 2 g topically 4 (four) times daily.    DILTIAZEM (CARDIZEM SR) 60 MG CP12    Take 1 capsule (60 mg total) by mouth 2 (two) times daily.    ELIQUIS 5 MG TAB    Take 1 tablet (5 mg total) by mouth 2 (two) times daily.    FLUTICASONE-SALMETEROL DISKUS INHALER 250-50 MCG    Inhale 1 puff  into the lungs 2 (two) times daily. Controller    FOOD SUPPLEMT, LACTOSE-REDUCED (ENSURE) LIQD    Take 118 mLs by mouth 3 (three) times daily with meals.    FUROSEMIDE (LASIX) 40 MG TABLET    TAKE 2 TABLETS EVERY MORNING  AND TAKE 1 TABLET EVERY EVENING    GABAPENTIN (NEURONTIN) 800 MG TABLET    Take 1 tablet (800 mg total) by mouth 3 (three) times daily.    GLIMEPIRIDE (AMARYL) 2 MG TABLET    TAKE 1 TABLET (2 MG TOTAL) BY MOUTH BEFORE BREAKFAST.    INHALATION SPACING DEVICE    Use as directed for inhalation.    INSULIN (LANTUS SOLOSTAR U-100 INSULIN) GLARGINE 100 UNITS/ML (3ML) SUBQ PEN    INJECT 35 UNITS SUBCUTANEOUSLY EVERY EVENING    INSULIN ASPART U-100 (NOVOLOG FLEXPEN U-100 INSULIN) 100 UNIT/ML (3 ML) INPN PEN    INJECT 10 UNITS SUBCUTANEOUSLY THREE TIMES DAILY WITH MEALS    INSULIN SYRINGE-NEEDLE U-100 1 ML 31 GAUGE X 5/16 SYRG        IPRATROPIUM (ATROVENT) 0.02 % NEBULIZER SOLUTION    USE 1 VIAL VIA NEBULIZER FOUR TIMES DAILY    ISOSORBIDE MONONITRATE (IMDUR) 60 MG 24 HR TABLET    Take 1 tablet (60 mg total) by mouth once daily.    LANCETS 32 GAUGE MISC    1 lancet by Misc.(Non-Drug; Combo Route) route 2 (two) times daily.    LATANOPROST 0.005 % OPHTHALMIC SOLUTION    INSTILL 1 DROP INTO BOTH EYES EVERY EVENING    LISINOPRIL 10 MG TABLET    Take 1 tablet (10 mg total) by mouth once daily.    MAGNESIUM OXIDE (MAG-OX) 400 MG (241.3 MG MAGNESIUM) TABLET    Take 1 tablet (400 mg total) by mouth once daily.    METFORMIN (GLUCOPHAGE-XR) 500 MG XR 24HR TABLET    Take 2 tablets (1,000 mg total) by mouth 2 (two) times daily with meals.    METOPROLOL SUCCINATE (TOPROL-XL) 50 MG 24 HR TABLET    Take 1 tablet (50 mg total) by mouth once daily.    NITROGLYCERIN (NITROSTAT) 0.3 MG SL TABLET    PLACE 1 TABLET UNDER THE TONGUE EVERY 5 MINUTES AS NEEDED FOR CHEST PAIN, NO MORE THAN 3 TABLETS IN ONE DAY    OZEMPIC 1 MG/DOSE (2 MG/1.5 ML) PNIJ        PANTOPRAZOLE (PROTONIX) 40 MG TABLET    Take 1 tablet (40 mg total) by mouth  "once daily.    PEN NEEDLE, DIABETIC (BD ULTRA-FINE SHORT PEN NEEDLE) 31 GAUGE X 5/16" NDLE    Inject 1 each into the skin 3 (three) times daily.    PREDNISONE (DELTASONE) 5 MG TABLET    Take 1 tablet (5 mg total) by mouth 2 (two) times daily.    SERTRALINE (ZOLOFT) 100 MG TABLET    Take 1 tablet (100 mg total) by mouth every evening.    SILVER SULFADIAZINE 1% (SILVADENE) 1 % CREAM    Apply topically 2 (two) times daily.    TAMSULOSIN (FLOMAX) 0.4 MG CAP    Take 1 capsule (0.4 mg total) by mouth once daily.    TIOTROPIUM (SPIRIVA WITH HANDIHALER) 18 MCG INHALATION CAPSULE    Inhale 1 capsule (18 mcg total) into the lungs once daily. Controller    TRAZODONE (DESYREL) 100 MG TABLET    Take 2 tablets (200 mg total) by mouth every evening.    VIOS AEROSOL DELIVERY SYSTEM VIN    USE AS DIRESTED   Changed and/or Refilled Medications    Modified Medication Previous Medication    AZATHIOPRINE (IMURAN) 50 MG TAB azaTHIOprine (IMURAN) 50 mg Tab       Take 1 tablet (50 mg total) by mouth once daily.    Take 1 tablet (50 mg total) by mouth once daily.    HYDROCODONE-ACETAMINOPHEN (NORCO) 7.5-325 MG PER TABLET HYDROcodone-acetaminophen (NORCO) 7.5-325 mg per tablet       Take 1 tablet by mouth every 6 (six) hours as needed for Pain.    Take 1 tablet by mouth every 6 (six) hours as needed for Pain.       Review of patient's allergies indicates:   Allergen Reactions    Protamine Hives     Urticaria, possible upper airway swelling       Past Surgical History:   Procedure Laterality Date    ABLATION OF ARRHYTHMOGENIC FOCUS FOR ATRIAL FIBRILLATION N/A 2/27/2020    Procedure: Ablation atrial fibrillation;  Surgeon: Gio Brown MD;  Location: [facility] EP LAB;  Service: Cardiology;  Laterality: N/A;  afib, DAMIEN (cx if SR), PVI, PITO, anes, MB, 3 Prep    CHOLECYSTECTOMY      CLOSURE OF WOUND Left 7/1/2020    Procedure: CLOSURE, WOUND;  Surgeon: Barb Veras DPM;  Location: [facility] OR;  Service: Podiatry;  Laterality: Left;    " SELECTIVE INJECTION OF ANESTHETIC AGENT AROUND LUMBAR SPINAL NERVE ROOT BY TRANSFORAMINAL APPROACH Left 6/11/2020    Procedure: BLOCK, SPINAL NERVE ROOT, LUMBAR, SELECTIVE, TRANSFORAMINAL APPROACH Left L4-5, L5-S1 TFESI with RN IV sedation;  Surgeon: Dillan Tsai MD;  Location: [facility];  Service: Pain Management;  Laterality: Left;    TOE AMPUTATION Left 6/29/2020    Procedure: AMPUTATION, TOE;  Surgeon: aBrb Veras DPM;  Location: [facility] OR;  Service: Podiatry;  Laterality: Left;  great toe       Family History   Problem Relation Age of Onset    Glaucoma Mother     Cataracts Mother     Cataracts Father     Glaucoma Sister     Cataracts Sister     Glaucoma Maternal Aunt     No Known Problems Brother     No Known Problems Daughter     No Known Problems Son     Prostate cancer Neg Hx        Social History     Socioeconomic History    Marital status: Legally      Spouse name: Not on file    Number of children: 5    Years of education: Not on file    Highest education level: Not on file   Occupational History    Occupation: disabled   Social Needs    Financial resource strain: Not on file    Food insecurity     Worry: Not on file     Inability: Not on file    Transportation needs     Medical: Yes     Non-medical: Not on file   Tobacco Use    Smoking status: Never Smoker    Smokeless tobacco: Never Used   Substance and Sexual Activity    Alcohol use: Not Currently    Drug use: No    Sexual activity: Yes     Partners: Female   Lifestyle    Physical activity     Days per week: Not on file     Minutes per session: Not on file    Stress: Rather much   Relationships    Social connections     Talks on phone: Not on file     Gets together: Not on file     Attends Christianity service: Not on file     Active member of club or organization: Not on file     Attends meetings of clubs or organizations: Not on file     Relationship status: Not on file   Other Topics Concern    Not on file  "  Social History Narrative    Not on file       Vitals:    07/23/20 0859   BP: 138/72   Pulse: 86   Weight: 91.6 kg (202 lb)   Height: 5' 5" (1.651 m)   PainSc:   8   PainLoc: Foot       Review of Systems   Constitutional: Negative for chills and fever.   Respiratory: Negative for shortness of breath.    Cardiovascular: Negative for chest pain, palpitations, orthopnea, claudication and leg swelling.   Gastrointestinal: Negative for diarrhea, nausea and vomiting.   Musculoskeletal: Negative for joint pain.   Skin: Negative for rash.   Neurological: Positive for tingling and sensory change.   Psychiatric/Behavioral: Negative.              Objective:      Physical examination: General: Patient is in no acute distress, alert and oriented x 3.  Dressing to left foot clean, dry, and intact.   Patient presents ambulating in surgical shoe to left foot with walker.    Lower Extremity Exam:  Vascular: Dorsalis pedis and Posterior tibial pulses palpable on left foot.  Capillary fill time <4 sec to toes on left foot. Decreased edema noted on left foot.   Dermatologic: Sutures Intact. Incision site dehisced left foot. Wound dehiscence measurement is [id number]. (+) exposed bone. Negative erythema, drainage, or increased temp noted to surgical site.   Neurological: Light touch sensation intact to left foot.   Musculoskeletal: Negative pain on palpation/ROM of left foot. Left hallux amputation noted.                   Assessment:       Encounter Diagnoses   Name Primary?    Status post amputation of left great toe Yes    Type II diabetes mellitus with neurological manifestations          Plan:   Status post amputation of left great toe  -     HYDROcodone-acetaminophen (NORCO) 7.5-325 mg per tablet; Take 1 tablet by mouth every 6 (six) hours as needed for Pain.  Dispense: 20 tablet; Refill: 0  -     sulfamethoxazole-trimethoprim 800-160mg (BACTRIM DS) 800-160 mg Tab; Take 1 tablet by mouth 2 (two) times daily. for 10 days  " Dispense: 20 tablet; Refill: 1  -     SUBSEQUENT HOME HEALTH ORDERS    Type II diabetes mellitus with neurological manifestations  -     SUBSEQUENT HOME HEALTH ORDERS      I counseled the patient on his conditions, regarding findings of my examination, my impressions, and usual treatment plan.   Left foot packed with 1/4' iodoform packing and dressed with Betadine soaked adaptic, gauze, kerlix, and ACE.   Patient instructed to keep dressing dry, clean and intact until home health comes out.   Patient instructed to continue to ambulate in surgical shoe and walker.   Refilled prescription Norco 7.5-325mg to be taken as needed for pain. Patient advised on the possible elevation of blood pressure or heart effects and caution to take pills as needed and to discontinue use if symptoms arise, patient agreed.   Prescription written for Bactrim DS to be taken twice a day.   Subsequent home health orders completed.   Patient should call the clinic immediately if any signs of infection such as fever chills sweats increased redness or pain.  Patient to return in 1 week or sooner if needed.             Barb Veras DPM  [facility]

## 2020-07-24 ENCOUNTER — OUTPATIENT CASE MANAGEMENT (OUTPATIENT)
Dept: ADMINISTRATIVE | Facility: OTHER | Age: 65
End: 2020-07-24

## 2020-07-24 LAB — COMPLEXED PSA SERPL-MCNC: 1.5 NG/ML (ref 0–4)

## 2020-07-24 NOTE — PROGRESS NOTES
7/24/20, Phoned [facility]. They do not offer individual counseling but IOP is re-opened. She will contact patient to assess for admission to the program.

## 2020-07-27 ENCOUNTER — DOCUMENT SCAN (OUTPATIENT)
Dept: HOME HEALTH SERVICES | Facility: HOSPITAL | Age: 65
End: 2020-07-27
Payer: MEDICARE

## 2020-07-27 ENCOUNTER — HOSPITAL ENCOUNTER (EMERGENCY)
Facility: HOSPITAL | Age: 65
Discharge: HOME | End: 2020-07-27
Attending: FAMILY MEDICINE
Payer: MEDICARE

## 2020-07-27 ENCOUNTER — OUTPATIENT CASE MANAGEMENT (OUTPATIENT)
Dept: ADMINISTRATIVE | Facility: OTHER | Age: 65
End: 2020-07-27

## 2020-07-27 VITALS
HEART RATE: 77 BPM | BODY MASS INDEX: 32.69 KG/M2 | HEIGHT: 65 IN | TEMPERATURE: 99 F | DIASTOLIC BLOOD PRESSURE: 67 MMHG | SYSTOLIC BLOOD PRESSURE: 120 MMHG | WEIGHT: 196.19 LBS | OXYGEN SATURATION: 98 % | RESPIRATION RATE: 18 BRPM

## 2020-07-27 DIAGNOSIS — N17.9 AKI (ACUTE KIDNEY INJURY): Primary | ICD-10-CM

## 2020-07-27 DIAGNOSIS — R53.83 FATIGUE: ICD-10-CM

## 2020-07-27 LAB
ALBUMIN SERPL BCP-MCNC: 3 G/DL (ref 3.5–5.2)
ALP SERPL-CCNC: 63 U/L (ref 55–135)
ALT SERPL W/O P-5'-P-CCNC: 24 U/L (ref 10–44)
ANION GAP SERPL CALC-SCNC: 12 MMOL/L (ref 8–16)
AST SERPL-CCNC: 22 U/L (ref 10–40)
BASOPHILS # BLD AUTO: 0.01 K/UL (ref 0–0.2)
BASOPHILS NFR BLD: 0.1 % (ref 0–1.9)
BILIRUB SERPL-MCNC: 0.2 MG/DL (ref 0.1–1)
BUN SERPL-MCNC: 18 MG/DL (ref 8–23)
CALCIUM SERPL-MCNC: 8.5 MG/DL (ref 8.7–10.5)
CHLORIDE SERPL-SCNC: 108 MMOL/L (ref 95–110)
CO2 SERPL-SCNC: 21 MMOL/L (ref 23–29)
CREAT SERPL-MCNC: 1.6 MG/DL (ref 0.5–1.4)
DIFFERENTIAL METHOD: ABNORMAL
EOSINOPHIL # BLD AUTO: 0.5 K/UL (ref 0–0.5)
EOSINOPHIL NFR BLD: 4.1 % (ref 0–8)
ERYTHROCYTE [DISTWIDTH] IN BLOOD BY AUTOMATED COUNT: 15.7 % (ref 11.5–14.5)
EST. GFR  (AFRICAN AMERICAN): 52 ML/MIN/1.73 M^2
EST. GFR  (NON AFRICAN AMERICAN): 45 ML/MIN/1.73 M^2
GLUCOSE SERPL-MCNC: 83 MG/DL (ref 70–110)
HCT VFR BLD AUTO: 31.7 % (ref 40–54)
HGB BLD-MCNC: 9.4 G/DL (ref 14–18)
IMM GRANULOCYTES # BLD AUTO: 0.05 K/UL (ref 0–0.04)
IMM GRANULOCYTES NFR BLD AUTO: 0.5 % (ref 0–0.5)
LYMPHOCYTES # BLD AUTO: 3.1 K/UL (ref 1–4.8)
LYMPHOCYTES NFR BLD: 27.9 % (ref 18–48)
MCH RBC QN AUTO: 26.7 PG (ref 27–31)
MCHC RBC AUTO-ENTMCNC: 29.7 G/DL (ref 32–36)
MCV RBC AUTO: 90 FL (ref 82–98)
MONOCYTES # BLD AUTO: 1.1 K/UL (ref 0.3–1)
MONOCYTES NFR BLD: 9.9 % (ref 4–15)
NEUTROPHILS # BLD AUTO: 6.4 K/UL (ref 1.8–7.7)
NEUTROPHILS NFR BLD: 57.5 % (ref 38–73)
NRBC BLD-RTO: 0 /100 WBC
PLATELET # BLD AUTO: 190 K/UL (ref 150–350)
PMV BLD AUTO: 10 FL (ref 9.2–12.9)
POTASSIUM SERPL-SCNC: 4.3 MMOL/L (ref 3.5–5.1)
PROT SERPL-MCNC: 7.3 G/DL (ref 6–8.4)
RBC # BLD AUTO: 3.52 M/UL (ref 4.6–6.2)
SODIUM SERPL-SCNC: 141 MMOL/L (ref 136–145)
TROPONIN I SERPL DL<=0.01 NG/ML-MCNC: 0.06 NG/ML (ref 0–0.03)
TSH SERPL DL<=0.005 MIU/L-ACNC: 1.53 UIU/ML (ref 0.4–4)
WBC # BLD AUTO: 11.04 K/UL (ref 3.9–12.7)

## 2020-07-27 PROCEDURE — 99284 EMERGENCY DEPT VISIT MOD MDM: CPT | Mod: 25,HCNC

## 2020-07-27 PROCEDURE — 84484 ASSAY OF TROPONIN QUANT: CPT | Mod: HCNC

## 2020-07-27 PROCEDURE — 25000003 PHARM REV CODE 250: Mod: HCNC | Performed by: FAMILY MEDICINE

## 2020-07-27 PROCEDURE — 36415 COLL VENOUS BLD VENIPUNCTURE: CPT | Mod: HCNC

## 2020-07-27 PROCEDURE — 93010 ELECTROCARDIOGRAM REPORT: CPT | Mod: HCNC,,, | Performed by: INTERNAL MEDICINE

## 2020-07-27 PROCEDURE — 85025 COMPLETE CBC W/AUTO DIFF WBC: CPT | Mod: HCNC

## 2020-07-27 PROCEDURE — 80053 COMPREHEN METABOLIC PANEL: CPT | Mod: HCNC

## 2020-07-27 PROCEDURE — 93005 ELECTROCARDIOGRAM TRACING: CPT | Mod: HCNC

## 2020-07-27 PROCEDURE — 84443 ASSAY THYROID STIM HORMONE: CPT | Mod: HCNC

## 2020-07-27 PROCEDURE — 93010 EKG 12-LEAD: ICD-10-PCS | Mod: HCNC,,, | Performed by: INTERNAL MEDICINE

## 2020-07-27 PROCEDURE — 96360 HYDRATION IV INFUSION INIT: CPT | Mod: HCNC

## 2020-07-27 RX ORDER — ACETAMINOPHEN 325 MG/1
650 TABLET ORAL
Status: COMPLETED | OUTPATIENT
Start: 2020-07-27 | End: 2020-07-27

## 2020-07-27 RX ADMIN — SODIUM CHLORIDE 500 ML: 0.9 INJECTION, SOLUTION INTRAVENOUS at 09:07

## 2020-07-27 RX ADMIN — ACETAMINOPHEN 650 MG: 325 TABLET ORAL at 09:07

## 2020-07-27 NOTE — PROGRESS NOTES
Summary:  Phoned patient. Provided information about IOP v individual counseling. He said that IOP had called but he had not called back due to number of appointments. Informed patient that LCSW addressed this with IOP and they are willing to work around his appointments. Patient requested number to call IOP back. Provided phone number.    Outpatient Care Management   - Care Plan Follow Up    Patient: Crow Green  MRN:  [id number]  Date of Service:  7/27/2020  Completed by:  Amanda Whipple LCSW  Referral Date: 06/29/2020  Program: Case Management (High Risk)    Reason for Visit   Patient presents with    Update Plan Of Care     7/27/20       Complex Care Plan     Care plan was discussed and completed today with input from patient and/or caregiver.        Patient Instructions     Instructions were provided via the [employer] patient [employer] and are available for the patient to view on the patient portal.    Follow up in about 1 week (around 8/3/2020) for Address follow up on referral to IOP. .    Todays OPCM Self-Management Care Plan was developed with the patients/caregivers input and was based on identified barriers from todays assessment.  Goals were written today with the patient/caregiver and the patient has agreed to work towards these goals to improve his/her overall well-being. Patient verbalized understanding of the care plan, goals, and all of today's instructions. Encouraged patient/caregiver to communicate with his/her physician and health care team about health conditions and the treatment plan.  Provided my contact information today and encouraged patient/caregiver to call me with any questions as needed.

## 2020-07-28 ENCOUNTER — CARE AT HOME (OUTPATIENT)
Dept: HOME HEALTH SERVICES | Facility: CLINIC | Age: 65
End: 2020-07-28
Payer: MEDICARE

## 2020-07-28 ENCOUNTER — TELEPHONE (OUTPATIENT)
Dept: PHARMACY | Facility: CLINIC | Age: 65
End: 2020-07-28

## 2020-07-28 VITALS
SYSTOLIC BLOOD PRESSURE: 101 MMHG | RESPIRATION RATE: 20 BRPM | OXYGEN SATURATION: 97 % | DIASTOLIC BLOOD PRESSURE: 56 MMHG | TEMPERATURE: 98 F | HEART RATE: 78 BPM

## 2020-07-28 DIAGNOSIS — I95.81 POSTPROCEDURAL HYPOTENSION: ICD-10-CM

## 2020-07-28 DIAGNOSIS — R53.1 WEAKNESS GENERALIZED: ICD-10-CM

## 2020-07-28 DIAGNOSIS — Z09 FOLLOW UP: Primary | ICD-10-CM

## 2020-07-28 DIAGNOSIS — Z71.89 ADVANCED DIRECTIVES, COUNSELING/DISCUSSION: ICD-10-CM

## 2020-07-28 PROCEDURE — 3078F PR MOST RECENT DIASTOLIC BLOOD PRESSURE < 80 MM HG: ICD-10-PCS | Mod: CPTII,S$GLB,, | Performed by: NURSE PRACTITIONER

## 2020-07-28 PROCEDURE — 99349 HOME/RES VST EST MOD MDM 40: CPT | Mod: S$GLB,,, | Performed by: NURSE PRACTITIONER

## 2020-07-28 PROCEDURE — 3078F DIAST BP <80 MM HG: CPT | Mod: CPTII,S$GLB,, | Performed by: NURSE PRACTITIONER

## 2020-07-28 PROCEDURE — 99497 ADVNCD CARE PLAN 30 MIN: CPT | Mod: 25,S$GLB,, | Performed by: NURSE PRACTITIONER

## 2020-07-28 PROCEDURE — 3074F PR MOST RECENT SYSTOLIC BLOOD PRESSURE < 130 MM HG: ICD-10-PCS | Mod: CPTII,S$GLB,, | Performed by: NURSE PRACTITIONER

## 2020-07-28 PROCEDURE — 99497 PR ADVNCD CARE PLAN 30 MIN: ICD-10-PCS | Mod: 25,S$GLB,, | Performed by: NURSE PRACTITIONER

## 2020-07-28 PROCEDURE — 99349 PR HOME VISIT,ESTAB PATIENT,LEVEL III: ICD-10-PCS | Mod: S$GLB,,, | Performed by: NURSE PRACTITIONER

## 2020-07-28 PROCEDURE — 3074F SYST BP LT 130 MM HG: CPT | Mod: CPTII,S$GLB,, | Performed by: NURSE PRACTITIONER

## 2020-07-28 NOTE — ED PROVIDER NOTES
SCRIBE #1 NOTE: I, Shari Adams, am scribing for, and in the presence of, Fransisca Escobar MD. I have scribed the entire note.       History     Chief Complaint   Patient presents with    Fatigue     Hypotensive. Left great toe amputation 2 wks ago.     Review of patient's allergies indicates:   Allergen Reactions    Protamine Hives     Urticaria, possible upper airway swelling         History of Present Illness     HPI    7/27/2020, 9:11 PM  History obtained from the patient      History of Present Illness: Crow Green is a 65 y.o. male patient with a PMHx of asthma, atrial fibrillation, CHF, type 2 DM, and HTN s/p left great toe amputation (6/29/20) who presents to the Emergency Department for evaluation of fatigue which onset gradually PTA. Pt states his home health nurse was concerned because his blood pressure dropped and suggested he come to the ED for evaluation. Symptoms are constant and moderate in severity. No mitigating or exacerbating factors reported. No associated sxs reported. Patient denies any fever, CP, SOB, cough, n/v, and all other sxs at this time. No prior Tx reported. No further complaints or concerns at this time.       Arrival mode: AASI    PCP: Mateo Lambert DO        Past Medical History:  Past Medical History:   Diagnosis Date    Arthritis     Asthma     Atrial fibrillation     Atrial fibrillation with RVR 8/7/2019    CHF (congestive heart failure)     Depression     Dermatomyositis     Diabetes mellitus, type 2 Diagnosed in 2000    Elevated PSA     Hypertension     Sleep apnea        Past Surgical History:  Past Surgical History:   Procedure Laterality Date    ABLATION OF ARRHYTHMOGENIC FOCUS FOR ATRIAL FIBRILLATION N/A 2/27/2020    Procedure: Ablation atrial fibrillation;  Surgeon: Gio Brown MD;  Location: [facility] EP LAB;  Service: Cardiology;  Laterality: N/A;  afib, DAMIEN (cx if SR), PVI, PITO, anes, MB, 3 Prep    CHOLECYSTECTOMY      CLOSURE OF WOUND  Left 7/1/2020    Procedure: CLOSURE, WOUND;  Surgeon: Barb Veras DPM;  Location: [facility] OR;  Service: Podiatry;  Laterality: Left;    SELECTIVE INJECTION OF ANESTHETIC AGENT AROUND LUMBAR SPINAL NERVE ROOT BY TRANSFORAMINAL APPROACH Left 6/11/2020    Procedure: BLOCK, SPINAL NERVE ROOT, LUMBAR, SELECTIVE, TRANSFORAMINAL APPROACH Left L4-5, L5-S1 TFESI with RN IV sedation;  Surgeon: Dillan Tsai MD;  Location: [facility];  Service: Pain Management;  Laterality: Left;    TOE AMPUTATION Left 6/29/2020    Procedure: AMPUTATION, TOE;  Surgeon: Barb Veras DPM;  Location: [facility] OR;  Service: Podiatry;  Laterality: Left;  great toe         Family History:  Family History   Problem Relation Age of Onset    Glaucoma Mother     Cataracts Mother     Cataracts Father     Glaucoma Sister     Cataracts Sister     Glaucoma Maternal Aunt     No Known Problems Brother     No Known Problems Daughter     No Known Problems Son     Prostate cancer Neg Hx        Social History:  Social History     Tobacco Use    Smoking status: Never Smoker    Smokeless tobacco: Never Used   Substance and Sexual Activity    Alcohol use: Not Currently    Drug use: No    Sexual activity: Yes     Partners: Female        Review of Systems     Review of Systems   Constitutional: Positive for fatigue. Negative for fever.   HENT: Negative for sore throat.    Respiratory: Negative for cough and shortness of breath.    Cardiovascular: Negative for chest pain.   Gastrointestinal: Negative for nausea and vomiting.   Genitourinary: Negative for dysuria.   Musculoskeletal: Negative for back pain.   Skin: Negative for rash.   Neurological: Negative for weakness.   Hematological: Does not bruise/bleed easily.   All other systems reviewed and are negative.       Physical Exam     Initial Vitals [07/27/20 2007]   BP Pulse Resp Temp SpO2   (!) 104/57 76 18 98.7 °F (37.1 °C) 99 %      MAP       --          Physical Exam  Nursing Notes and Vital  Signs Reviewed.  Constitutional: Patient is in no acute distress. Well-developed and well-nourished.  Head: Atraumatic. Normocephalic.  Eyes: PERRL. EOM intact. Conjunctivae are not pale. No scleral icterus.  ENT: Mucous membranes are moist. Oropharynx is clear and symmetric.    Neck: Supple. Full ROM. No lymphadenopathy.  Cardiovascular: Regular rate. Regular rhythm. No murmurs, rubs, or gallops. Distal pulses are 2+ and symmetric.  Pulmonary/Chest: No respiratory distress. Clear to auscultation bilaterally. No wheezing or rales.  Abdominal: Soft and non-distended.  There is no tenderness.  No rebound, guarding, or rigidity. Good bowel sounds.  Genitourinary: No CVA tenderness  Musculoskeletal: Moves all extremities. No obvious deformities. No edema. No calf tenderness.   Skin: Warm and dry. Bandages on the left foot.   Neurological:  Alert, awake, and appropriate.  Normal speech.  No acute focal neurological deficits are appreciated.  Psychiatric: Normal affect. Good eye contact. Appropriate in content.     ED Course   Critical Care    Date/Time: 7/27/2020 9:43 PM  Performed by: Fransisca Escobar MD  Authorized by: Fransisca Escobar MD   Direct patient critical care time: 20 minutes  Additional history critical care time: 10 minutes  Ordering / reviewing critical care time: 5 minutes  Documentation critical care time: 5 minutes  Consulting other physicians critical care time: 5 minutes  Total critical care time (exclusive of procedural time) : 45 minutes  Critical care time was exclusive of separately billable procedures and treating other patients and teaching time.  Critical care was necessary to treat or prevent imminent or life-threatening deterioration of the following conditions: JOSE MARIA.  Critical care was time spent personally by me on the following activities: blood draw for specimens, development of treatment plan with patient or surrogate, interpretation of cardiac output measurements, evaluation of patient's  "response to treatment, examination of patient, obtaining history from patient or surrogate, ordering and performing treatments and interventions, ordering and review of laboratory studies, ordering and review of radiographic studies, pulse oximetry, re-evaluation of patient's condition and review of old charts.        ED Vital Signs:  Vitals:    07/27/20 2007   BP: (!) 104/57   Pulse: 76   Resp: 18   Temp: 98.7 °F (37.1 °C)   TempSrc: Oral   SpO2: 99%   Weight: 89 kg (196 lb 3.4 oz)   Height: 5' 5" (1.651 m)       Abnormal Lab Results:  Labs Reviewed   CBC W/ AUTO DIFFERENTIAL - Abnormal; Notable for the following components:       Result Value    RBC 3.52 (*)     Hemoglobin 9.4 (*)     Hematocrit 31.7 (*)     Mean Corpuscular Hemoglobin 26.7 (*)     Mean Corpuscular Hemoglobin Conc 29.7 (*)     RDW 15.7 (*)     Immature Grans (Abs) 0.05 (*)     Mono # 1.1 (*)     All other components within normal limits   COMPREHENSIVE METABOLIC PANEL - Abnormal; Notable for the following components:    CO2 21 (*)     Creatinine 1.6 (*)     Calcium 8.5 (*)     Albumin 3.0 (*)     eGFR if  52 (*)     eGFR if non  45 (*)     All other components within normal limits   TROPONIN I - Abnormal; Notable for the following components:    Troponin I 0.057 (*)     All other components within normal limits   TSH        All Lab Results:  Results for orders placed or performed during the hospital encounter of 07/27/20   CBC auto differential   Result Value Ref Range    WBC 11.04 3.90 - 12.70 K/uL    RBC 3.52 (L) 4.60 - 6.20 M/uL    Hemoglobin 9.4 (L) 14.0 - 18.0 g/dL    Hematocrit 31.7 (L) 40.0 - 54.0 %    Mean Corpuscular Volume 90 82 - 98 fL    Mean Corpuscular Hemoglobin 26.7 (L) 27.0 - 31.0 pg    Mean Corpuscular Hemoglobin Conc 29.7 (L) 32.0 - 36.0 g/dL    RDW 15.7 (H) 11.5 - 14.5 %    Platelets 190 150 - 350 K/uL    MPV 10.0 9.2 - 12.9 fL    Immature Granulocytes 0.5 0.0 - 0.5 %    Gran # (ANC) 6.4 1.8 - " 7.7 K/uL    Immature Grans (Abs) 0.05 (H) 0.00 - 0.04 K/uL    Lymph # 3.1 1.0 - 4.8 K/uL    Mono # 1.1 (H) 0.3 - 1.0 K/uL    Eos # 0.5 0.0 - 0.5 K/uL    Baso # 0.01 0.00 - 0.20 K/uL    nRBC 0 0 /100 WBC    Gran% 57.5 38.0 - 73.0 %    Lymph% 27.9 18.0 - 48.0 %    Mono% 9.9 4.0 - 15.0 %    Eosinophil% 4.1 0.0 - 8.0 %    Basophil% 0.1 0.0 - 1.9 %    Differential Method Automated    Comprehensive metabolic panel   Result Value Ref Range    Sodium 141 136 - 145 mmol/L    Potassium 4.3 3.5 - 5.1 mmol/L    Chloride 108 95 - 110 mmol/L    CO2 21 (L) 23 - 29 mmol/L    Glucose 83 70 - 110 mg/dL    BUN, Bld 18 8 - 23 mg/dL    Creatinine 1.6 (H) 0.5 - 1.4 mg/dL    Calcium 8.5 (L) 8.7 - 10.5 mg/dL    Total Protein 7.3 6.0 - 8.4 g/dL    Albumin 3.0 (L) 3.5 - 5.2 g/dL    Total Bilirubin 0.2 0.1 - 1.0 mg/dL    Alkaline Phosphatase 63 55 - 135 U/L    AST 22 10 - 40 U/L    ALT 24 10 - 44 U/L    Anion Gap 12 8 - 16 mmol/L    eGFR if African American 52 (A) >60 mL/min/1.73 m^2    eGFR if non African American 45 (A) >60 mL/min/1.73 m^2   TSH   Result Value Ref Range    TSH 1.529 0.400 - 4.000 uIU/mL   Troponin I   Result Value Ref Range    Troponin I 0.057 (H) 0.000 - 0.026 ng/mL     *Note: Due to a large number of results and/or encounters for the requested time period, some results have not been displayed. A complete set of results can be found in Results Review.         Imaging Results:  Imaging Results    None          The EKG was ordered, reviewed, and independently interpreted by the ED provider.  Interpretation time: 20:58  Rate: 77 BPM  Rhythm: Sinus rhythm with 1st degree AV block with premature atrial complexes.  Interpretation: Left anterior fascicular block. Septal infarct, age undetermined. No STEMI.             The Emergency Provider reviewed the vital signs and test results, which are outlined above.     ED Discussion       9:42 PM: Reassessed pt at this time. Discussed with pt all pertinent ED information and results.  Discussed pt dx and plan of tx. Gave pt all f/u and return to the ED instructions. All questions and concerns were addressed at this time. Pt expresses understanding of information and instructions, and is comfortable with plan to discharge. Pt is stable for discharge.    I discussed with patient and/or family/caretaker that evaluation in the ED does not suggest any emergent or life threatening medical conditions requiring immediate intervention beyond what was provided in the ED, and I believe patient is safe for discharge.  Regardless, an unremarkable evaluation in the ED does not preclude the development or presence of a serious of life threatening condition. As such, patient was instructed to return immediately for any worsening or change in current symptoms.      ED Course as of Jul 27 2143   Mon Jul 27, 2020 2059 Re-evaluation:  Patient resting bed.  Patient is not hypotensive, blood pressure is 120/83    [ZIGGY]   2129 Troponin is at patient's baseline if not better than normal at this time I do not suspect a cardiac etiology.  The patient is being treated for infection of his amputation.  The tube likely due to this process.   Troponin I(!): 0.057 [ZIGGY]      ED Course User Index  [ZIGGY] Fransisca Escobar MD     Medical Decision Making:   Clinical Tests:   Lab Tests: Ordered and Reviewed  Medical Tests: Ordered and Reviewed           ED Medication(s):  Medications   sodium chloride 0.9% bolus 500 mL (500 mLs Intravenous New Bag 7/27/20 2139)   acetaminophen tablet 650 mg (650 mg Oral Given 7/27/20 2139)       Current Discharge Medication List          Follow-up Information     Mateo Lambert DO. Schedule an appointment as soon as possible for a visit in 3 days.    Specialty: Family Medicine  Why: As needed, If symptoms worsen  Contact information:  [address] [address] [address]  [phone]                       Scribe Attestation:   Scribe #1: I performed the above scribed service and the documentation  "accurately describes the services I performed. I attest to the accuracy of the note.     Attending:   Physician Attestation Statement for Scribe #1: I, Fransisca Escobar MD, personally performed the services described in this documentation, as scribed by Shrai Adams, in my presence, and it is both accurate and complete.           Clinical Impression       ICD-10-CM ICD-9-CM   1. JOSE MARIA (acute kidney injury)  N17.9 584.9   2. Fatigue  R53.83 780.79       Disposition:   Disposition: Discharged  Condition: Stable    Portions of this note may have been created with voice recognition software. Occasional "wrong-word" or "sound-a-like" substitutions may have occurred due to the inherent limitations of voice recognition software. Please, read the note carefully and recognize, using context, where substitutions have occurred.          Fransisca Escobar MD  07/29/20 7951    "

## 2020-07-28 NOTE — DISCHARGE INSTRUCTIONS
Regarding MALAISE & FATIGUE, I provided patient with tips for reducing feelings of malaise and fatigue: get enough sleep each night; eat a healthy, well-balanced diet and drink plenty of water throughout the day; exercise regularly; learn ways to relax (yoga or meditation); maintain a reasonable work and personal schedule; take a multivitamin; and avoid alcohol, nicotine, and drug use. I also informed patient that malaise and fatigue can be a complication of many conditions including diabetes, heart disease, anemia, infection, malnutrition, kidney disease, and liver disease.  Encouraged patient to take all medications as prescribed and participate in developing an individualized care plan with primary care provider that is most appropriate for their needs and conditions.

## 2020-07-29 DIAGNOSIS — J45.40 MODERATE PERSISTENT ASTHMA WITHOUT COMPLICATION: Primary | ICD-10-CM

## 2020-07-30 ENCOUNTER — OUTPATIENT CASE MANAGEMENT (OUTPATIENT)
Dept: ADMINISTRATIVE | Facility: OTHER | Age: 65
End: 2020-07-30

## 2020-07-30 PROBLEM — Z71.89 ADVANCED DIRECTIVES, COUNSELING/DISCUSSION: Status: ACTIVE | Noted: 2020-07-30

## 2020-07-30 PROBLEM — R53.1 WEAKNESS GENERALIZED: Status: ACTIVE | Noted: 2020-07-30

## 2020-07-30 PROBLEM — I95.81 POSTPROCEDURAL HYPOTENSION: Status: ACTIVE | Noted: 2020-07-30

## 2020-07-30 PROBLEM — Z09 FOLLOW UP: Status: ACTIVE | Noted: 2020-07-30

## 2020-07-30 RX ORDER — LISINOPRIL 10 MG/1
TABLET ORAL
Qty: 90 TABLET | Refills: 4 | Status: SHIPPED | OUTPATIENT
Start: 2020-07-30 | End: 2020-07-30 | Stop reason: DRUGHIGH

## 2020-07-30 RX ORDER — ASPIRIN 81 MG/1
81 TABLET ORAL DAILY
Refills: 0 | COMMUNITY
Start: 2020-07-30 | End: 2021-03-23 | Stop reason: SDUPTHER

## 2020-07-30 NOTE — PROGRESS NOTES
7/30/20, Patient requested assistance with window air conditioner units. He had contacted [employer] and received 2 fans. [employer] had come to evaluate building a ramp and addressed AC units. Phoned [employer], noted that they do not have sam money at this time to purchase a window unit. Due to COVID-19, they can not enter homes to install. If they can access a window unit, patient will need to install himself. Phoned [place] [phone], no resources available. Phoned [employer], no services available. With patient approval, entered request for help through [facility]. Phoned patient and provided update.     7/31/20, Patient phoned, updated on situation with the air conditioner. Patient stated that he has not heard from anyone. He stated that they do have one air conditioner operating. It is in his father's bedroom. It is cooling somewhat in that room and part of the living room.   Patient asked about [facility]. Resource contacted, they could not be reached today. Patient agreed to follow up on Monday. Encouraged patient to sleep in recliner in his father's bedroom to stay cool.

## 2020-07-30 NOTE — PROGRESS NOTES
"[facility]  Medical Home Visit    Visit Date: 07/29/20  Encounter Provider: Sumit Black NP  PCP:  Mateo Lambert DO    Subjective:      Patient ID: Crow Green is a 65 y.o. male.    Consult Requested By:  No ref. provider found  Reason for Consult: Medical Visit by Home Care Provider    The patient is being seen at home due to a physical debility that presents a taxing effort to leave the home, to mitigate high risk of hospital readmission or due to the limited availability of reliable or safe options for transportation to the point of access to the provider. The visit meets the criteria for medical necessity as defined by CMS as "health-care services needed to prevent, diagnose, or treat an illness, injury, condition, disease, or its symptoms and that meet accepted standards of medicine." Prior to treatment on this visit the chart was reviewed and patient consent was obtained.    Chief Complaint: Follow-up for chronic medical conditions and medication review, hypotensive episode    Today:  Mr. Crow Green is a 65 y.o. male Crow presents at lower that his baseline state of health as reported by patient and caregiver his father, with whom he lives. VS lower than his baseline blood pressure, He had been sent to the emergency department by his  nurse the previous evening for a synptomatic blood pressure measuring 78/50 He has spilled his entire pill planner all over the floor and was acutely confused. He was stabilized and discharged home to the care of his father. Upon my arrival, the patient had just been cleaned by the  AIDE. He was hygienic without any signs or symptoms of lethargy or weakness attributable to hypotension. He does report managed pain to his left foot surgical amputattion site, which is wrapped with ace bandage and is cared for MON-WED-Fri per the surguical team's wound care instructions. Denies any acute issues, concerns or complaints to address on " today's visit. His meds are still jumbled in a pile on the table but there are organized pill box to bridge until his next SN visit tomrrow to correct the pills to their appropriate slots. Reports taking all medications as prescribed. No other needs identified at this time. Risks of environmental exposure to coronavirus discussed including: social distancing, hand hygiene, awnd limiting departures from the home for necessities only.  Reports understanding and willingness to comply.  I will discontinue his cardizem and lisinopril for the time being until we can determine that his blood pressure has stabilized and may warrant resumpyuion in the future. He is instrutced along with his HH SN to maintain a  lood pressure log for me review on my next visit in two weeks     Review of Systems   Constitutional: Positive for appetite change. Negative for chills and fever.   Eyes: Negative.    Respiratory: Positive for cough. Negative for apnea and shortness of breath.    Cardiovascular: Positive for leg swelling. Negative for chest pain and palpitations.   Gastrointestinal: Positive for abdominal distention.   Endocrine: Negative.    Musculoskeletal: Positive for arthralgias, gait problem and joint swelling.   Skin: Positive for color change and wound (left foot amputation).   Hematological: Negative.    Psychiatric/Behavioral: Positive for agitation and confusion (r/t plan of care and meds).   All other systems reviewed and are negative.    Assessments:  · Environmental: single story home, no steps to enter, adequate lighting and temeprature control  · Functional Status: Independent with ADL's/IADL's, ambulates with assistance of a cane/walker, continent of bowel and bladder  · Safety: Fall Precautions, COVID Precautions/Social Distancing/Mask Use  · Nutritional: Adequate  · Home Health: [facility]  · DME/Supplies: walker/wpud care supplies.billy,blood pressure cuff     Objective:     Vitals:    07/28/20 1013   BP: (!)  101/56   Pulse: 78   Resp: 20   Temp: 97.7 °F (36.5 °C)   TempSrc: Temporal   SpO2: 97%   PainSc:   7     There is no height or weight on file to calculate BMI.    Physical Exam  Constitutional:       General: He is awake. He is in acute distress.      Appearance: Normal appearance. He is obese. He is ill-appearing.   HENT:      Head: Normocephalic.      Nose: Nose normal.   Eyes:      General: Lids are normal.   Neck:      Musculoskeletal: Normal range of motion and neck supple.   Cardiovascular:      Rate and Rhythm: Normal rate and regular rhythm.      Pulses:           Dorsalis pedis pulses are 2+ on the right side and 1+ on the left side.      Heart sounds: Normal heart sounds. No murmur.   Pulmonary:      Effort: Pulmonary effort is normal. No accessory muscle usage, prolonged expiration or respiratory distress.      Breath sounds: Normal breath sounds.   Abdominal:      General: Abdomen is flat. Bowel sounds are normal. There is no distension.      Palpations: Abdomen is soft.      Tenderness: There is no abdominal tenderness.   Musculoskeletal:         General: Tenderness and signs of injury (surgery to left foot) present.      Left ankle: He exhibits decreased range of motion, swelling, deformity and abnormal pulse. Tenderness.      Right lower leg: Edema present.      Left lower leg: Edema present.      Left foot: Decreased range of motion.   Feet:      Right foot:      Skin integrity: Skin integrity normal.      Toenail Condition: Right toenails are abnormally thick.      Left foot:      Amputation: (great toe)     Skin integrity: Skin integrity normal.      Comments: Amputee of great toe, foot wrapped with ace  Skin:     General: Skin is warm and dry.      Capillary Refill: Capillary refill takes less than 2 seconds.      Findings: Bruising present.   Neurological:      General: No focal deficit present.      Mental Status: He is alert and oriented to person, place, and time. Mental status is at  baseline.   Psychiatric:         Mood and Affect: Mood normal.         Behavior: Behavior normal. Behavior is cooperative.         Assessment:     1. Follow up    2. Postprocedural hypotension    3. Weakness generalized      Plan:     Ethical / Legal: Advance Care Planning   Capacity to make medical decisions:  yes, Conflict no  · Surrogate decision maker:  Karly Green Relationship: Aunt  · Advance Directives:  none  · HCPOA: none  · [place]:  Signed this visit  · Code Status:  Full    Advanced Care Directives, and HCPOA  forms left in the home for family review, discussion and signing with instructions to return upon their next provider encounter for inclusion to the medical record. [place] signed by patient this visit full code status     Encounter for Medical Follow-Up and Medication Review   - [facility] Nurse Practitioner to schedule home visit with patient in one month or PRN.    Hypotension after procedure  -hold diltiazem  -hold lisinopril   Monitor VSS daily until my next visit to make any further adjustments.  -  SN is made aware of the plan of care    Were controlled substances prescribed?  No    Follow Up Appointments:   Future Appointments   Date Time Provider Department Center   7/31/2020  8:40 AM Barb Veras DPM [facility] POD [place]   8/5/2020  3:20 PM DO JAY Pratt IM [place]   8/11/2020  8:15 AM [facility] XR2 [facility] XRAY [place]   8/11/2020  8:30 AM PULMONARY LAB, [facility] PULMFUN [place]   8/11/2020  9:00 AM Eri Armstrong NP [facility] PULMSVC [place]   8/20/2020  8:00 AM Cisco Luciano MD [facility] ORTHO [place]   9/1/2020  3:00 PM Sumit Gonsalves NP [facility] [place]   9/14/2020  9:40 AM Barb Veras DPM [facility] POD [place]   10/1/2020  9:30 AM Eduardo Castillo PA-C [facility] [place]   10/29/2020 10:40 AM Eduard Zhao MD [facility] CARDIO [place]       Attestation: Screening criteria to assess the level of the patient's risk for infection  with COVID-19 as recommended by the CDC at the time of the above documented home visit concluded appropriateness to proceed. Universal precautions were maintained at all times, including provider use of >60% alcohol gel hand  immediately prior to entry and upon departing the patient's home as well as cleaning of equipment used in home visit with antibacterial/germicidal disposable wipes.     Signature:    Sumit Black, MSN, APRN, FNP-C  [facility]    Total face-to-face time was 40 min, >50% of this was spent on counseling and coordination of care. The following issues were discussed: primary and secondary diagnoses, co-morbidities, prescribed medications, treatment modalities, importance of compliance with medical advice and directives for follow-up care.    With the consent of the patient and/or caregiver(s), an additional 20 minutes included a comprehensive discussion regarding Advanced Care Planning. Sources of emotional and spiritual support were identified and encouraged for involvement to assist in finalization of decisions regarding the patient's goals of care (Living Will). Topics discussed include statutory guidelines of the [facility] to determine/delineate the legal surrogate medical decision maker should the patient be deemed incompetent to self-direct medical care (next of kin vs.POA) and the required documentation to ensure legal validity thereof (Advanced Directives/[place] Post).

## 2020-07-31 ENCOUNTER — OFFICE VISIT (OUTPATIENT)
Dept: PODIATRY | Facility: CLINIC | Age: 65
End: 2020-07-31
Payer: MEDICARE

## 2020-07-31 VITALS
HEART RATE: 88 BPM | DIASTOLIC BLOOD PRESSURE: 71 MMHG | HEIGHT: 65 IN | WEIGHT: 196.19 LBS | BODY MASS INDEX: 32.69 KG/M2 | SYSTOLIC BLOOD PRESSURE: 129 MMHG

## 2020-07-31 DIAGNOSIS — Z89.412 STATUS POST AMPUTATION OF LEFT GREAT TOE: Primary | ICD-10-CM

## 2020-07-31 DIAGNOSIS — E11.49 TYPE II DIABETES MELLITUS WITH NEUROLOGICAL MANIFESTATIONS: ICD-10-CM

## 2020-07-31 DIAGNOSIS — T81.31XD DEHISCENCE OF OPERATIVE WOUND, SUBSEQUENT ENCOUNTER: ICD-10-CM

## 2020-07-31 PROCEDURE — 99024 POSTOP FOLLOW-UP VISIT: CPT | Mod: HCNC,S$GLB,, | Performed by: PODIATRIST

## 2020-07-31 PROCEDURE — 99024 PR POST-OP FOLLOW-UP VISIT: ICD-10-PCS | Mod: HCNC,S$GLB,, | Performed by: PODIATRIST

## 2020-07-31 PROCEDURE — 99999 PR PBB SHADOW E&M-EST. PATIENT-LVL V: CPT | Mod: PBBFAC,HCNC,, | Performed by: PODIATRIST

## 2020-07-31 PROCEDURE — 99999 PR PBB SHADOW E&M-EST. PATIENT-LVL V: ICD-10-PCS | Mod: PBBFAC,HCNC,, | Performed by: PODIATRIST

## 2020-07-31 NOTE — PROGRESS NOTES
Subjective:     Patient ID: Crow Green is a 65 y.o. male.    Chief Complaint: Follow-up (f/u status post great toe amp. c/o no pain. wears dressing w/ post op shoe. Diabetic pt. PCP DR Lambert , last visit 7/23/20)    HPI: This 65 year old male returns to the clinic 1 months status post left hallux amputation procedure. Patient has no complaints of fever chills or sweats. Positive pain. Patient states dressing was kept dry, clean, and intact. Patient states home health has been coming out. Patient also states he completed the antibiotics as prescribed. Patient has no other pedal complaints today.     PCP: Dr. Lambert (7/23/2020)    Patient Active Problem List   Diagnosis    ED (erectile dysfunction)    Non-proliferative diabetic retinopathy, mild, both eyes    Hypertension associated with diabetes    Diabetic polyneuropathy    Nonobstructive coronary artery disease of native artery of native heart    Chronic combined systolic and diastolic HFrEF of 45%    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, with long-term current use of insulin    SANDRITA on CPAP    Moderate asthma    Psychophysiological insomnia    Nightmares    Sleep related gastroesophageal reflux disease    Inadequate sleep hygiene    PAF (paroxysmal atrial fibrillation)    At risk for medication noncompliance    Obesity (BMI 30.0-34.9)    Indeterminate stage chronic open angle glaucoma    Right hip pain    Gait instability    Chronic right shoulder pain    Arthritis    Left sided sciatica    Dyspnea on exertion    Osteoarthritis of spine with radiculopathy, lumbar region    HNP (herniated nucleus pulposus), lumbar    Gastroesophageal reflux disease without esophagitis    Chronic diastolic heart failure    Hypogonadism in male    Disorder of parathyroid gland    Hyperlipidemia associated with type 2 diabetes mellitus    JOSE MARIA (acute kidney injury)    LRTI (lower  respiratory tract infection)    Physical deconditioning    Acute pain of left shoulder    Traumatic tear of left rotator cuff    Atrial fibrillation with RVR    Other chronic pain    Left shoulder pain    Complete tear of left rotator cuff    Stage 3 chronic kidney disease    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 associated with type 2 diabetes mellitus    Lumbar radiculopathy    Diabetic ulcer of toe of left foot associated with type 2 diabetes mellitus, with necrosis of muscle    Ulcer of left foot    Elevated troponin    Hypotension    Dermatomyositis    Postprocedural hypotension    Follow up    Weakness generalized    Advanced directives, counseling/discussion       Medication List with Changes/Refills   Current Medications    ALCOHOL ANTISEPTIC PADS (ALCOHOL PREP PADS TOP)        ALLOPURINOL (ZYLOPRIM) 100 MG TABLET    Take 1 tablet (100 mg total) by mouth once daily.    ASPIRIN (ECOTRIN) 81 MG EC TABLET    Take 1 tablet (81 mg total) by mouth once daily.    ATORVASTATIN (LIPITOR) 40 MG TABLET    TAKE 1 TABLET EVERY EVENING    AZATHIOPRINE (IMURAN) 50 MG TAB    Take 1 tablet (50 mg total) by mouth once daily.    BACLOFEN (LIORESAL) 10 MG TABLET    Take 1 tablet (10 mg total) by mouth once daily.    BLOOD SUGAR DIAGNOSTIC (TRUE METRIX GLUCOSE TEST STRIP) STRP    Use with blood glucose meter kit to test blood sugar four times  daily    BLOOD-GLUCOSE METER (TRUE METRIX AIR GLUCOSE METER) KIT    TEST FOUR TIMES DAILY BEFORE MEALS  AND EVERY NIGHT    COLCHICINE (COLCRYS) 0.6 MG TABLET    Take 1 tablet (0.6 mg total) by mouth once daily.    DICLOFENAC SODIUM (VOLTAREN) 1 % GEL    Apply 2 g topically 4 (four) times daily.    ELIQUIS 5 MG TAB    Take 1 tablet (5 mg total) by mouth 2 (two) times daily.    FLUTICASONE-SALMETEROL DISKUS INHALER 250-50 MCG    Inhale 1 puff into the lungs 2 (two) times daily. Controller    FOOD SUPPLEMT, LACTOSE-REDUCED (ENSURE)  LIQD    Take 118 mLs by mouth 3 (three) times daily with meals.    FUROSEMIDE (LASIX) 40 MG TABLET    TAKE 2 TABLETS EVERY MORNING  AND TAKE 1 TABLET EVERY EVENING    GABAPENTIN (NEURONTIN) 800 MG TABLET    Take 1 tablet (800 mg total) by mouth 3 (three) times daily.    GLIMEPIRIDE (AMARYL) 2 MG TABLET    TAKE 1 TABLET (2 MG TOTAL) BY MOUTH BEFORE BREAKFAST.    HYDROCODONE-ACETAMINOPHEN (NORCO) 7.5-325 MG PER TABLET    Take 1 tablet by mouth every 6 (six) hours as needed for Pain.    INHALATION SPACING DEVICE    Use as directed for inhalation.    INSULIN (LANTUS SOLOSTAR U-100 INSULIN) GLARGINE 100 UNITS/ML (3ML) SUBQ PEN    INJECT 35 UNITS SUBCUTANEOUSLY EVERY EVENING    INSULIN ASPART U-100 (NOVOLOG FLEXPEN U-100 INSULIN) 100 UNIT/ML (3 ML) INPN PEN    INJECT 10 UNITS SUBCUTANEOUSLY THREE TIMES DAILY WITH MEALS    INSULIN SYRINGE-NEEDLE U-100 1 ML 31 GAUGE X 5/16 SYRG        IPRATROPIUM (ATROVENT) 0.02 % NEBULIZER SOLUTION    USE 1 VIAL VIA NEBULIZER FOUR TIMES DAILY    ISOSORBIDE MONONITRATE (IMDUR) 60 MG 24 HR TABLET    Take 1 tablet (60 mg total) by mouth once daily.    LANCETS 32 GAUGE MISC    1 lancet by Misc.(Non-Drug; Combo Route) route 2 (two) times daily.    LATANOPROST 0.005 % OPHTHALMIC SOLUTION    INSTILL 1 DROP INTO BOTH EYES EVERY EVENING    MAGNESIUM OXIDE (MAG-OX) 400 MG (241.3 MG MAGNESIUM) TABLET    Take 1 tablet (400 mg total) by mouth once daily.    METFORMIN (GLUCOPHAGE-XR) 500 MG XR 24HR TABLET    Take 2 tablets (1,000 mg total) by mouth 2 (two) times daily with meals.    METOPROLOL SUCCINATE (TOPROL-XL) 50 MG 24 HR TABLET    Take 1 tablet (50 mg total) by mouth once daily.    NITROGLYCERIN (NITROSTAT) 0.3 MG SL TABLET    PLACE 1 TABLET UNDER THE TONGUE EVERY 5 MINUTES AS NEEDED FOR CHEST PAIN, NO MORE THAN 3 TABLETS IN ONE DAY    OZEMPIC 1 MG/DOSE (2 MG/1.5 ML) PNIJ        PANTOPRAZOLE (PROTONIX) 40 MG TABLET    Take 1 tablet (40 mg total) by mouth once daily.    PEN NEEDLE, DIABETIC (BD  "ULTRA-FINE SHORT PEN NEEDLE) 31 GAUGE X 5/16" NDLE    Inject 1 each into the skin 3 (three) times daily.    PREDNISONE (DELTASONE) 5 MG TABLET    Take 1 tablet (5 mg total) by mouth 2 (two) times daily.    SERTRALINE (ZOLOFT) 100 MG TABLET    Take 1 tablet (100 mg total) by mouth every evening.    SILVER SULFADIAZINE 1% (SILVADENE) 1 % CREAM    Apply topically 2 (two) times daily.    SULFAMETHOXAZOLE-TRIMETHOPRIM 800-160MG (BACTRIM DS) 800-160 MG TAB    Take 1 tablet by mouth 2 (two) times daily. for 10 days    TAMSULOSIN (FLOMAX) 0.4 MG CAP    Take 1 capsule (0.4 mg total) by mouth once daily.    TIOTROPIUM (SPIRIVA WITH HANDIHALER) 18 MCG INHALATION CAPSULE    Inhale 1 capsule (18 mcg total) into the lungs once daily. Controller    TRAZODONE (DESYREL) 100 MG TABLET    Take 2 tablets (200 mg total) by mouth every evening.    VIOS AEROSOL DELIVERY SYSTEM VIN    USE AS DIRESTED       Review of patient's allergies indicates:   Allergen Reactions    Protamine Hives     Urticaria, possible upper airway swelling       Past Surgical History:   Procedure Laterality Date    ABLATION OF ARRHYTHMOGENIC FOCUS FOR ATRIAL FIBRILLATION N/A 2/27/2020    Procedure: Ablation atrial fibrillation;  Surgeon: Gio Brown MD;  Location: [facility] EP LAB;  Service: Cardiology;  Laterality: N/A;  afib, DAMIEN (cx if SR), PVI, PITO, anes, MB, 3 Prep    CHOLECYSTECTOMY      CLOSURE OF WOUND Left 7/1/2020    Procedure: CLOSURE, WOUND;  Surgeon: Barb Veras DPM;  [facility] OR;  Service: Podiatry;  Laterality: Left;    SELECTIVE INJECTION OF ANESTHETIC AGENT AROUND LUMBAR SPINAL NERVE ROOT BY TRANSFORAMINAL APPROACH Left 6/11/2020    Procedure: BLOCK, SPINAL NERVE ROOT, LUMBAR, SELECTIVE, TRANSFORAMINAL APPROACH Left L4-5, L5-S1 TFESI with RN IV sedation;  Surgeon: Dillan Tsai MD;  Location: [facility];  Service: Pain Management;  Laterality: Left;    TOE AMPUTATION Left 6/29/2020    Procedure: AMPUTATION, TOE;  Surgeon: " "Barb Veras DPM;  Location: [facility] OR;  Service: Podiatry;  Laterality: Left;  great toe       Family History   Problem Relation Age of Onset    Glaucoma Mother     Cataracts Mother     Cataracts Father     Glaucoma Sister     Cataracts Sister     Glaucoma Maternal Aunt     No Known Problems Brother     No Known Problems Daughter     No Known Problems Son     Prostate cancer Neg Hx        Social History     Socioeconomic History    Marital status: Legally      Spouse name: Not on file    Number of children: 5    Years of education: Not on file    Highest education level: Not on file   Occupational History    Occupation: disabled   Social Needs    Financial resource strain: Not on file    Food insecurity     Worry: Not on file     Inability: Not on file    Transportation needs     Medical: Yes     Non-medical: Not on file   Tobacco Use    Smoking status: Never Smoker    Smokeless tobacco: Never Used   Substance and Sexual Activity    Alcohol use: Not Currently    Drug use: No    Sexual activity: Yes     Partners: Female   Lifestyle    Physical activity     Days per week: Not on file     Minutes per session: Not on file    Stress: Rather much   Relationships    Social connections     Talks on phone: Not on file     Gets together: Not on file     Attends Orthodoxy service: Not on file     Active member of club or organization: Not on file     Attends meetings of clubs or organizations: Not on file     Relationship status: Not on file   Other Topics Concern    Not on file   Social History Narrative    Not on file       Vitals:    07/31/20 0827   BP: 129/71   Pulse: 88   Weight: 89 kg (196 lb 3.4 oz)   Height: 5' 5" (1.651 m)       Review of Systems   Constitutional: Negative for chills and fever.   Respiratory: Negative for shortness of breath.    Cardiovascular: Negative for chest pain, palpitations, orthopnea, claudication and leg swelling.   Gastrointestinal: Negative for " diarrhea, nausea and vomiting.   Musculoskeletal: Negative for joint pain.   Skin: Negative for rash.   Neurological: Positive for tingling and sensory change. Negative for dizziness, focal weakness and weakness.   Psychiatric/Behavioral: Negative.            Objective:      Physical examination: General: Patient is in no acute distress, alert and oriented x 3.  Dressing to left foot clean, dry, and intact.   Patient presents ambulating in surgical shoe with walker.    Lower Extremity Exam:  Vascular: Dorsalis pedis and Posterior tibial pulses palpable on left foot.  Capillary fill time <4 sec to toes on left foot. Minimal edema noted on left foot.   Dermatologic: Wound dehiscence measurement is [id number]. (+) exposed bone. Negative erythema, drainage, or increased temp noted to surgical site.   Neurological: Light touch sensation intact to left foot.   Musculoskeletal: Negative pain on palpation/ROM of left foot. Left hallux amputation noted.                   Assessment:       Encounter Diagnoses   Name Primary?   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   Left foot edged painted with Gentian Violet and packed with  1/4' iodoform packing and dressed with, gauze, kerlix, and ACE.   Patient instructed to keep dressing dry, clean and intact until home health comes out.   Patient instructed to continue to ambulate in surgical shoe and walker.   Wound vac form completed.   Patient should call the clinic immediately if any signs of infection such as  fever chills sweats increased redness or pain.  Patient to return in 1 week or sooner if needed.                 Barb Veras DPM  [facility]

## 2020-07-31 NOTE — PATIENT INSTRUCTIONS
- [facility] to schedule follow-up visit with patient in 4-6 weeks or as needed.  - Continue all medications, treatments and therapies as ordered.   - Follow all instructions, recommendations as discussed.  - Maintain Safety Precautions at all times.  - Attend all medical appointments as scheduled.  - For worsening symptoms: call Primary Care Physician or Nurse Practitioner.  - For emergencies, call 911 or immediately report to the nearest emergency room.  - Limit Risks of environmental exposure to coronavirus as discussed including: social distancing, hand hygiene, and limiting departures from the home for necessities only.

## 2020-08-03 ENCOUNTER — DOCUMENT SCAN (OUTPATIENT)
Dept: HOME HEALTH SERVICES | Facility: HOSPITAL | Age: 65
End: 2020-08-03
Payer: MEDICARE

## 2020-08-03 ENCOUNTER — TELEPHONE (OUTPATIENT)
Dept: PULMONOLOGY | Facility: CLINIC | Age: 65
End: 2020-08-03

## 2020-08-03 NOTE — TELEPHONE ENCOUNTER
Called pt to schedule pre-procedural Covid-19 testing, 72 hours prior to Spirometry. Pt refused Covid test.

## 2020-08-04 ENCOUNTER — OUTPATIENT CASE MANAGEMENT (OUTPATIENT)
Dept: ADMINISTRATIVE | Facility: OTHER | Age: 65
End: 2020-08-04

## 2020-08-04 NOTE — PROGRESS NOTES
Summary: Phoned patient. Updated referral status and resources for acquiring a window air conditioner. Encouraged patient to periodically call [facility] to check about availability. Patient stated that he had checked with [employer] about SSD v SS. He was advised to continue to receive SSD.   Patient stated that he had gone back to his part time job working at the [employer] which was not demanding and helped him financially.   Outpatient Care Management   - Care Plan Follow Up    Patient: Crow Green  MRN:  [id number]  Date of Service:  8/4/2020  Completed by:  Amanda Whipple LCSW  Referral Date: 06/29/2020  Program: Case Management (High Risk)    Reason for Visit   Patient presents with    Update Plan Of Care     8/4/20       Complex Care Plan     Care plan was discussed and completed today with input from patient and/or caregiver.    Patient Instructions     Follow up in about 1 week (around 8/11/2020) for Confirm receipt of resources, discuss [facility], update referral and start date with IOP. .    Todays OPCM Self-Management Care Plan was developed with the patients/caregivers input and was based on identified barriers from todays assessment.  Goals were written today with the patient/caregiver and the patient has agreed to work towards these goals to improve his/her overall well-being. Patient verbalized understanding of the care plan, goals, and all of today's instructions. Encouraged patient/caregiver to communicate with his/her physician and health care team about health conditions and the treatment plan.  Provided my contact information today and encouraged patient/caregiver to call me with any questions as needed.

## 2020-08-05 ENCOUNTER — OFFICE VISIT (OUTPATIENT)
Dept: INTERNAL MEDICINE | Facility: CLINIC | Age: 65
End: 2020-08-05
Payer: MEDICARE

## 2020-08-05 ENCOUNTER — APPOINTMENT (OUTPATIENT)
Dept: RADIOLOGY | Facility: HOSPITAL | Age: 65
End: 2020-08-05
Attending: FAMILY MEDICINE
Payer: MEDICARE

## 2020-08-05 ENCOUNTER — TELEPHONE (OUTPATIENT)
Dept: INTERNAL MEDICINE | Facility: CLINIC | Age: 65
End: 2020-08-05

## 2020-08-05 VITALS
SYSTOLIC BLOOD PRESSURE: 138 MMHG | HEIGHT: 65 IN | RESPIRATION RATE: 20 BRPM | BODY MASS INDEX: 31.79 KG/M2 | OXYGEN SATURATION: 98 % | HEART RATE: 81 BPM | DIASTOLIC BLOOD PRESSURE: 80 MMHG | WEIGHT: 190.81 LBS | TEMPERATURE: 98 F

## 2020-08-05 DIAGNOSIS — R53.1 WEAKNESS GENERALIZED: ICD-10-CM
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
## 2020-08-05 DIAGNOSIS — L97.529 ULCER OF LEFT FOOT, UNSPECIFIED ULCER STAGE: ICD-10-CM

## 2020-08-05 DIAGNOSIS — R07.81 RIB PAIN: ICD-10-CM

## 2020-08-05 DIAGNOSIS — F25.1 SCHIZOAFFECTIVE DISORDER, DEPRESSIVE TYPE: ICD-10-CM

## 2020-08-05 DIAGNOSIS — J44.9 CHRONIC OBSTRUCTIVE PULMONARY DISEASE, UNSPECIFIED COPD TYPE: ICD-10-CM

## 2020-08-05 DIAGNOSIS — Z09 FOLLOW UP: Primary | ICD-10-CM

## 2020-08-05 DIAGNOSIS — N18.30 STAGE 3 CHRONIC KIDNEY DISEASE: Chronic | ICD-10-CM

## 2020-08-05 DIAGNOSIS — F14.21 COCAINE DEPENDENCE, IN REMISSION: ICD-10-CM

## 2020-08-05 PROCEDURE — 3051F PR MOST RECENT HEMOGLOBIN A1C LEVEL 7.0 - < 8.0%: ICD-10-PCS | Mod: HCNC,CPTII,S$GLB, | Performed by: FAMILY MEDICINE

## 2020-08-05 PROCEDURE — 3008F PR BODY MASS INDEX (BMI) DOCUMENTED: ICD-10-PCS | Mod: HCNC,CPTII,S$GLB, | Performed by: FAMILY MEDICINE

## 2020-08-05 PROCEDURE — 99999 PR PBB SHADOW E&M-EST. PATIENT-LVL V: ICD-10-PCS | Mod: PBBFAC,HCNC,, | Performed by: FAMILY MEDICINE

## 2020-08-05 PROCEDURE — 99499 UNLISTED E&M SERVICE: CPT | Mod: S$PBB,,, | Performed by: FAMILY MEDICINE

## 2020-08-05 PROCEDURE — 99499 RISK ADDL DX/OHS AUDIT: ICD-10-PCS | Mod: S$PBB,,, | Performed by: FAMILY MEDICINE

## 2020-08-05 PROCEDURE — 3075F SYST BP GE 130 - 139MM HG: CPT | Mod: HCNC,CPTII,S$GLB, | Performed by: FAMILY MEDICINE

## 2020-08-05 PROCEDURE — 3079F DIAST BP 80-89 MM HG: CPT | Mod: HCNC,CPTII,S$GLB, | Performed by: FAMILY MEDICINE

## 2020-08-05 PROCEDURE — 3079F PR MOST RECENT DIASTOLIC BLOOD PRESSURE 80-89 MM HG: ICD-10-PCS | Mod: HCNC,CPTII,S$GLB, | Performed by: FAMILY MEDICINE

## 2020-08-05 PROCEDURE — 3008F BODY MASS INDEX DOCD: CPT | Mod: HCNC,CPTII,S$GLB, | Performed by: FAMILY MEDICINE

## 2020-08-05 PROCEDURE — 71100 X-RAY EXAM RIBS UNI 2 VIEWS: CPT | Mod: TC,HCNC,PO,RT

## 2020-08-05 PROCEDURE — 71100 X-RAY EXAM RIBS UNI 2 VIEWS: CPT | Mod: 26,HCNC,RT, | Performed by: RADIOLOGY

## 2020-08-05 PROCEDURE — 71100 XR RIBS 2 VIEW RIGHT: ICD-10-PCS | Mod: 26,HCNC,RT, | Performed by: RADIOLOGY

## 2020-08-05 PROCEDURE — 3075F PR MOST RECENT SYSTOLIC BLOOD PRESS GE 130-139MM HG: ICD-10-PCS | Mod: HCNC,CPTII,S$GLB, | Performed by: FAMILY MEDICINE

## 2020-08-05 PROCEDURE — 99999 PR PBB SHADOW E&M-EST. PATIENT-LVL V: CPT | Mod: PBBFAC,HCNC,, | Performed by: FAMILY MEDICINE

## 2020-08-05 PROCEDURE — 99214 OFFICE O/P EST MOD 30 MIN: CPT | Mod: HCNC,S$GLB,, | Performed by: FAMILY MEDICINE

## 2020-08-05 PROCEDURE — 3051F HG A1C>EQUAL 7.0%<8.0%: CPT | Mod: HCNC,CPTII,S$GLB, | Performed by: FAMILY MEDICINE

## 2020-08-05 PROCEDURE — 99214 PR OFFICE/OUTPT VISIT, EST, LEVL IV, 30-39 MIN: ICD-10-PCS | Mod: HCNC,S$GLB,, | Performed by: FAMILY MEDICINE

## 2020-08-05 RX ORDER — LISINOPRIL 10 MG/1
10 TABLET ORAL DAILY
COMMUNITY
Start: 2020-07-31 | End: 2020-08-18 | Stop reason: SDUPTHER

## 2020-08-05 NOTE — TELEPHONE ENCOUNTER
----- Message from Prisca Zamarripa sent at 8/5/2020  1:29 PM CDT -----  Regarding: rewuest sent over  Type:  Pharmacy Calling to Clarify an RX    Name of Caller:Jaquelin  Pharmacy Name:[facility]  Prescription Name:n/a  What do they need to clarify?:sent over request for pt already, no response  Best Call Back Number:[phone]  Additional Information: Please call back. Thanks

## 2020-08-05 NOTE — PROGRESS NOTES
Subjective:       Patient ID: Crow Green is a 65 y.o. male.    Chief Complaint: Follow-up (amputation of left great toe f/u with  on Monday) and Fall (monday)    Here today with c/o fall and follow up after left great toe amputation.  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 Was readmitted on 7/27 with JOSE MARIA and hypotension. Is getting Monday, Wednesday, Friday wound care with home health. They came this morning prior to his arrival. Due to follow up with Dr. Veras this coming Monday.    Fall  The accident occurred 2 days ago. The fall occurred while walking. He landed on hard floor. Pain location: right chest wall, left thumb. The pain is at a severity of 10/10. The symptoms are aggravated by movement and pressure on injury (right arm movement). Associated symptoms include headaches. Pertinent negatives include no nausea, numbness, tingling or vomiting. He has tried acetaminophen, immobilization and rest for the symptoms. The treatment provided mild relief.     Diabetes Management Status    Statin: Taking  ACE/ARB: Taking    Screening or Prevention Patient's value Goal Complete/Controlled?   HgA1C Testing and Control   Lab Results   Component Value Date    HGBA1C 7.7 (H) 05/19/2020      Annually/Less than 8% Yes   Lipid profile : 05/21/2020 Annually Yes   LDL control Lab Results   Component Value Date    LDLCALC 62.4 (L) 05/21/2020    Annually/Less than 100 mg/dl  Yes   Nephropathy screening Lab Results   Component Value Date    LABMICR 21.0 06/26/2020     Lab Results   Component Value Date    PROTEINUA Trace (A) 07/05/2020    Annually Yes   Blood pressure BP Readings from Last 1 Encounters:   08/05/20 138/80    Less than 140/90 Yes   Dilated retinal exam : 05/28/2020 Annually Yes   Foot exam   : 06/26/2020 Annually Yes       Family History   Problem Relation Age of Onset    Glaucoma Mother     Cataracts Mother     Cataracts Father     Glaucoma Sister     Cataracts Sister      Glaucoma Maternal Aunt     No Known Problems Brother     No Known Problems Daughter     No Known Problems Son     Prostate cancer Neg Hx        Current Outpatient Medications:     ALCOHOL ANTISEPTIC PADS (ALCOHOL PREP PADS TOP), , Disp: , Rfl:     allopurinoL (ZYLOPRIM) 100 MG tablet, Take 1 tablet (100 mg total) by mouth once daily., Disp: 30 tablet, Rfl: 0    aspirin (ECOTRIN) 81 MG EC tablet, Take 1 tablet (81 mg total) by mouth once daily., Disp: , Rfl: 0    atorvastatin (LIPITOR) 40 MG tablet, TAKE 1 TABLET EVERY EVENING, Disp: 90 tablet, Rfl: 4    azaTHIOprine (IMURAN) 50 mg Tab, Take 1 tablet (50 mg total) by mouth once daily., Disp: 90 tablet, Rfl: 0    baclofen (LIORESAL) 10 MG tablet, Take 1 tablet (10 mg total) by mouth once daily., Disp: 90 tablet, Rfl: 3    blood sugar diagnostic (TRUE METRIX GLUCOSE TEST STRIP) Strp, Use with blood glucose meter kit to test blood sugar four times  daily, Disp: 200 strip, Rfl: 99    blood-glucose meter (TRUE METRIX AIR GLUCOSE METER) kit, TEST FOUR TIMES DAILY BEFORE MEALS  AND EVERY NIGHT, Disp: 1 each, Rfl: 0    diclofenac sodium (VOLTAREN) 1 % Gel, Apply 2 g topically 4 (four) times daily., Disp: 1 Tube, Rfl: 5    ELIQUIS 5 mg Tab, Take 1 tablet (5 mg total) by mouth 2 (two) times daily., Disp: 60 tablet, Rfl: 0    fluticasone-salmeterol diskus inhaler 250-50 mcg, Inhale 1 puff into the lungs 2 (two) times daily. Controller, Disp: 60 each, Rfl: 11    food supplemt, lactose-reduced (ENSURE) Liqd, Take 118 mLs by mouth 3 (three) times daily with meals., Disp: 1 Bottle, Rfl: 4    furosemide (LASIX) 40 MG tablet, TAKE 2 TABLETS EVERY MORNING  AND TAKE 1 TABLET EVERY EVENING, Disp: 270 tablet, Rfl: 0    gabapentin (NEURONTIN) 800 MG tablet, Take 1 tablet (800 mg total) by mouth 3 (three) times daily., Disp: 270 tablet, Rfl: 4    glimepiride (AMARYL) 2 MG tablet, TAKE 1 TABLET (2 MG TOTAL) BY MOUTH BEFORE BREAKFAST., Disp: 90 tablet, Rfl: 3     HYDROcodone-acetaminophen (NORCO) 7.5-325 mg per tablet, Take 1 tablet by mouth every 6 (six) hours as needed for Pain., Disp: 20 tablet, Rfl: 0    inhalation spacing device, Use as directed for inhalation., Disp: 1 Device, Rfl: 0    insulin (LANTUS SOLOSTAR U-100 INSULIN) glargine 100 units/mL (3mL) SubQ pen, INJECT 35 UNITS SUBCUTANEOUSLY EVERY EVENING, Disp: 75 mL, Rfl: 4    insulin aspart U-100 (NOVOLOG FLEXPEN U-100 INSULIN) 100 unit/mL (3 mL) InPn pen, INJECT 10 UNITS SUBCUTANEOUSLY THREE TIMES DAILY WITH MEALS, Disp: 15 mL, Rfl: 0    insulin syringe-needle U-100 1 mL 31 gauge x 5/16 Syrg, , Disp: , Rfl:     ipratropium (ATROVENT) 0.02 % nebulizer solution, USE 1 VIAL VIA NEBULIZER FOUR TIMES DAILY, Disp: 937.5 mL, Rfl: 2    isosorbide mononitrate (IMDUR) 60 MG 24 hr tablet, Take 1 tablet (60 mg total) by mouth once daily., Disp: 90 tablet, Rfl: 3    lancets 32 gauge Misc, 1 lancet by Misc.(Non-Drug; Combo Route) route 2 (two) times daily., Disp: 100 each, Rfl: 11    latanoprost 0.005 % ophthalmic solution, INSTILL 1 DROP INTO BOTH EYES EVERY EVENING, Disp: 1 Bottle, Rfl: 2    lisinopriL 10 MG tablet, Take 10 mg by mouth once daily., Disp: , Rfl:     magnesium oxide (MAG-OX) 400 mg (241.3 mg magnesium) tablet, Take 1 tablet (400 mg total) by mouth once daily., Disp: , Rfl: 0    metFORMIN (GLUCOPHAGE-XR) 500 MG XR 24hr tablet, Take 2 tablets (1,000 mg total) by mouth 2 (two) times daily with meals., Disp: 360 tablet, Rfl: 3    metoprolol succinate (TOPROL-XL) 50 MG 24 hr tablet, Take 1 tablet (50 mg total) by mouth once daily., Disp: 90 tablet, Rfl: 3    nitroGLYCERIN (NITROSTAT) 0.3 MG SL tablet, PLACE 1 TABLET UNDER THE TONGUE EVERY 5 MINUTES AS NEEDED FOR CHEST PAIN, NO MORE THAN 3 TABLETS IN ONE DAY, Disp: 100 tablet, Rfl: 0    OZEMPIC 1 mg/dose (2 mg/1.5 mL) PnIj, , Disp: , Rfl:     pantoprazole (PROTONIX) 40 MG tablet, Take 1 tablet (40 mg total) by mouth once daily., Disp: 30 tablet, Rfl:  "11    pen needle, diabetic (BD ULTRA-FINE SHORT PEN NEEDLE) 31 gauge x 5/16" Ndle, Inject 1 each into the skin 3 (three) times daily., Disp: 100 each, Rfl: 11    predniSONE (DELTASONE) 5 MG tablet, Take 1 tablet (5 mg total) by mouth 2 (two) times daily., Disp: 60 tablet, Rfl: 0    sertraline (ZOLOFT) 100 MG tablet, Take 1 tablet (100 mg total) by mouth every evening., Disp: 90 tablet, Rfl: 4    silver sulfADIAZINE 1% (SILVADENE) 1 % cream, Apply topically 2 (two) times daily., Disp: 50 g, Rfl: 0    tamsulosin (FLOMAX) 0.4 mg Cap, Take 1 capsule (0.4 mg total) by mouth once daily., Disp: 30 capsule, Rfl: 11    traZODone (DESYREL) 100 MG tablet, Take 2 tablets (200 mg total) by mouth every evening., Disp: 180 tablet, Rfl: 1    VIOS AEROSOL DELIVERY SYSTEM Shefali, USE AS DIRESTED, Disp: , Rfl: 0    colchicine (COLCRYS) 0.6 mg tablet, Take 1 tablet (0.6 mg total) by mouth once daily., Disp: 30 tablet, Rfl: 0    tiotropium (SPIRIVA WITH HANDIHALER) 18 mcg inhalation capsule, Inhale 1 capsule (18 mcg total) into the lungs once daily. Controller, Disp: 90 capsule, Rfl: 0    Review of Systems   Constitutional: Positive for activity change.   Respiratory: Negative for cough and shortness of breath.    Cardiovascular: Positive for chest pain. Negative for leg swelling.   Gastrointestinal: Negative for diarrhea, nausea and vomiting.   Skin: Positive for wound (left great toe amputation).   Neurological: Positive for headaches. Negative for tingling and numbness.       Objective:   /80   Pulse 81   Temp 97.7 °F (36.5 °C) (Temporal)   Resp 20   Ht 5' 5" (1.651 m)   Wt 86.5 kg (190 lb 12.9 oz)   SpO2 98%   BMI 31.75 kg/m²      Physical Exam  Vitals signs and nursing note reviewed.   Constitutional:       General: He is not in acute distress.     Appearance: He is obese.   Neck:      Musculoskeletal: Normal range of motion.   Cardiovascular:      Pulses: Normal pulses.   Pulmonary:      Effort: Pulmonary " effort is normal.   Chest:      Chest wall: Tenderness (increased pain with right arm movement) present.   Musculoskeletal: Normal range of motion.   Feet:      Comments: Left great toe wound, wrapped, and in walking boot, saw wound care this morning with home health  Neurological:      Mental Status: He is alert.   Psychiatric:         Behavior: Behavior is cooperative.         Assessment & Plan     Problem List Items Addressed This Visit        Psychiatric    Cocaine dependence, in remission    Current Assessment & Plan     Continue abstinence         Schizoaffective disorder, depressive type    Current Assessment & Plan     Stable. 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 Encouraged to continue good adherence with medications         Relevant Orders    Comprehensive metabolic panel       Derm    Ulcer of left foot    Overview     -i discussed case with Dr. Veras podiatrist who recommended hospital admission         Current Assessment & Plan     Continue follow up with podiatry next week. Will be getting wound vac            Pulmonary    Chronic obstructive pulmonary disease       Renal/    Stage 3 chronic kidney disease (Chronic)    Current Assessment & Plan     Did have acute decrease in GFR with recent admit. Following up on this           Relevant Orders    CBC auto differential       Other    Weakness generalized    Current Assessment & Plan     Filled out handicapp placard form          RESOLVED: Follow up - Primary      Other Visit Diagnoses     Fall, initial encounter        Relevant Orders    X-Ray Ribs 2 View Right (Completed)    Rib pain    No sign of fracture on xray    Relevant Orders    X-Ray Ribs 2 View Right (Completed)            No follow-ups on file.    Disclaimer:  This note may  have been prepared using voice recognition software, it may have not been extensively proofed, as such there could be errors within the text such as sound alike errors.

## 2020-08-05 NOTE — PROGRESS NOTES
Outpatient Care Management  Plan of Care Follow Up Visit    Patient: Crow Green  MRN: [id number]  Date of Service: 07/22/2020  Completed by: Dang Clay RN  Referral Date: 06/29/2020  Program: Case Management (High Risk)    Reason for Visit   Patient presents with    OPCM RN First Follow-Up Attempt    OPCM RN Second Follow-Up Attempt       Brief Summary: Phone contact today for follow up. Noted on chart review prior to contact, blood sugars have been within desired range recently. Pt reports poor appetite. Discussed importance of good nutrition to promote healing of surgical wound, that should be low carb, low Na and high protein. Encouraged grilled or baked lean meats or fish with fresh or fresh frozen vegetables. He advised that is what he tries to eat. He voices good compliance with meds as prescribed. Advised this CM noted two of his BP meds were recently put on hold related to hypotension and asked if he is still holding them or if they have been resumed. He states he is unsure, but thinks he is still not taking them. He advised his HH nurse sets his meds up the way the doctor wants them and he takes them out of the organizer. Advised this CM will plan to review his meds on next contact as he is currently in the [employer] transport van on the way to see his PCP. He voiced understanding and agreement with this plan for follow up.     Patient Summary     Involvement of Care:  Do I have permission to speak with other family members about your care?   Yes, Erica    Patient Reported Labs & Vitals:  1.  Any Patient Reported Labs & Vitals?   No  2.  Patient Reported Blood Pressure:     3.  Patient Reported Pulse:     4.  Patient Reported Weight (Kg):     5.  Patient Reported Blood Glucose (mg/dl):       Medical and social history was reviewed with patient and/or caregiver.     Clinical Assessment     Reviewed and provided basic information on available community resources for mental health,  transportation, wellness resources, and palliative care programs with patient and/or caregiver.     Complex Care Plan     Care plan was discussed and completed today with input from patient and/or caregiver.    Patient Instructions     Instructions were provided via the [employer] patient resources and are available for the patient to view on the patient portal.    Next Steps: Follow up in two weeks. Review meds. Does he have a working scale and weigh daily? Discuss acute and chronic complications of DM. Has he rec'd mailed literature and reviewed it? Dang Clay RN    Follow up in about 2 weeks (around 8/5/2020) for address care plan tasks.    Todays OPCM Self-Management Care Plan was developed with the patients/caregivers input and was based on identified barriers from todays assessment.  Goals were written today with the patient/caregiver and the patient has agreed to work towards these goals to improve his/her overall well-being. Patient verbalized understanding of the care plan, goals, and all of today's instructions. Encouraged patient/caregiver to communicate with his/her physician and health care team about health conditions and the treatment plan.  Provided my contact information today and encouraged patient/caregiver to call me with any questions as needed.

## 2020-08-07 ENCOUNTER — DOCUMENT SCAN (OUTPATIENT)
Dept: HOME HEALTH SERVICES | Facility: HOSPITAL | Age: 65
End: 2020-08-07
Payer: MEDICARE

## 2020-08-10 ENCOUNTER — PATIENT OUTREACH (OUTPATIENT)
Dept: ADMINISTRATIVE | Facility: OTHER | Age: 65
End: 2020-08-10

## 2020-08-10 ENCOUNTER — OFFICE VISIT (OUTPATIENT)
Dept: PODIATRY | Facility: CLINIC | Age: 65
End: 2020-08-10
Payer: MEDICARE

## 2020-08-10 VITALS
SYSTOLIC BLOOD PRESSURE: 126 MMHG | HEIGHT: 65 IN | WEIGHT: 190 LBS | DIASTOLIC BLOOD PRESSURE: 77 MMHG | BODY MASS INDEX: 31.65 KG/M2 | HEART RATE: 84 BPM

## 2020-08-10 DIAGNOSIS — M10.9 GOUT, UNSPECIFIED CAUSE, UNSPECIFIED CHRONICITY, UNSPECIFIED SITE: Primary | ICD-10-CM

## 2020-08-10 DIAGNOSIS — E11.49 TYPE II DIABETES MELLITUS WITH NEUROLOGICAL MANIFESTATIONS: ICD-10-CM

## 2020-08-10 DIAGNOSIS — G89.18 ACUTE POST-OPERATIVE PAIN: ICD-10-CM

## 2020-08-10 DIAGNOSIS — T87.81 DEHISCENCE OF AMPUTATION STUMP: ICD-10-CM

## 2020-08-10 DIAGNOSIS — Z89.412 STATUS POST AMPUTATION OF LEFT GREAT TOE: Primary | ICD-10-CM

## 2020-08-10 PROCEDURE — 99999 PR PBB SHADOW E&M-EST. PATIENT-LVL V: ICD-10-PCS | Mod: PBBFAC,HCNC,, | Performed by: PODIATRIST

## 2020-08-10 PROCEDURE — 99024 PR POST-OP FOLLOW-UP VISIT: ICD-10-PCS | Mod: HCNC,S$GLB,, | Performed by: PODIATRIST

## 2020-08-10 PROCEDURE — 99999 PR PBB SHADOW E&M-EST. PATIENT-LVL V: CPT | Mod: PBBFAC,HCNC,, | Performed by: PODIATRIST

## 2020-08-10 PROCEDURE — 99024 POSTOP FOLLOW-UP VISIT: CPT | Mod: HCNC,S$GLB,, | Performed by: PODIATRIST

## 2020-08-10 RX ORDER — HYDROCODONE BITARTRATE AND ACETAMINOPHEN 10; 325 MG/1; MG/1
1 TABLET ORAL EVERY 8 HOURS PRN
Qty: 20 TABLET | Refills: 0 | Status: SHIPPED | OUTPATIENT
Start: 2020-08-10 | End: 2020-08-17 | Stop reason: SDUPTHER

## 2020-08-10 RX ORDER — COLCHICINE 0.6 MG/1
0.6 TABLET ORAL DAILY
Qty: 30 TABLET | Refills: 0 | OUTPATIENT
Start: 2020-08-10 | End: 2020-08-24 | Stop reason: SDUPTHER

## 2020-08-10 NOTE — PROGRESS NOTES
Requested updates within Care Everywhere.  Patient's chart was reviewed for overdue REJI topics.

## 2020-08-11 ENCOUNTER — OUTPATIENT CASE MANAGEMENT (OUTPATIENT)
Dept: ADMINISTRATIVE | Facility: OTHER | Age: 65
End: 2020-08-11

## 2020-08-11 ENCOUNTER — HOSPITAL ENCOUNTER (OUTPATIENT)
Dept: RADIOLOGY | Facility: HOSPITAL | Age: 65
Discharge: HOME | End: 2020-08-11
Attending: NURSE PRACTITIONER
Payer: MEDICARE

## 2020-08-11 DIAGNOSIS — J45.40 MODERATE PERSISTENT ASTHMA WITHOUT COMPLICATION: ICD-10-CM

## 2020-08-11 PROCEDURE — 71046 X-RAY EXAM CHEST 2 VIEWS: CPT | Mod: 26,HCNC,, | Performed by: RADIOLOGY

## 2020-08-11 PROCEDURE — 71046 XR CHEST PA AND LATERAL: ICD-10-PCS | Mod: 26,HCNC,, | Performed by: RADIOLOGY

## 2020-08-11 PROCEDURE — 71046 X-RAY EXAM CHEST 2 VIEWS: CPT | Mod: TC,HCNC

## 2020-08-11 NOTE — PROGRESS NOTES
Outpatient Care Management   - Care Plan Follow Up    Patient: Crow Green  MRN:  [id number]  Date of Service:  8/11/2020  Completed by:  Amanda Whipple LCSW  Referral Date: 06/29/2020  Program: Case Management (High Risk)    Reason for Visit   Patient presents with    Update Plan Of Care     8/11/20       Brief Summary: Phoned patient. Mood improved. Discussed referral to [facility]. He stated that he has talked to them and will begin program when wound care is complete and he can bathe again as well as having less medical appointments.   Patient stated that he had an appointment this morning and was informed of other appointments. Those appointments were re-scheduled. He said that he plans to call transportation today to schedule some appointments. He noted no issues with current use of transportation.   Patient stated that he has received confirmation that the agency will be coming to install the ramp and install a room air conditioner. He stated that he will be glad to be able to move around the house more. Due to limited air flow, he has been staying in the bed more.   Patient agreed to call with any problems, scheduled follow up in two weeks.     Complex Care Plan     Care plan was discussed and completed today with input from patient and/or caregiver.    Patient Instructions     Instructions were provided via the [employer] patient resources and are available for the patient to view on the patient portal.    Follow up in about 2 weeks (around 8/25/2020) for Status of IOP. .    Todays OPCM Self-Management Care Plan was developed with the patients/caregivers input and was based on identified barriers from todays assessment.  Goals were written today with the patient/caregiver and the patient has agreed to work towards these goals to improve his/her overall well-being. Patient verbalized understanding of the care plan, goals, and all of today's instructions. Encouraged patient/caregiver to  communicate with his/her physician and health care team about health conditions and the treatment plan.  Provided my contact information today and encouraged patient/caregiver to call me with any questions as needed.

## 2020-08-14 ENCOUNTER — TELEPHONE (OUTPATIENT)
Dept: INTERNAL MEDICINE | Facility: CLINIC | Age: 65
End: 2020-08-14

## 2020-08-14 NOTE — TELEPHONE ENCOUNTER
----- Message from Sarahy Ochoa sent at 8/14/2020  2:19 PM CDT -----  Regarding: request call back  Pt contact [employer] , will cover wheel chair ... call back:  [phone]

## 2020-08-14 NOTE — TELEPHONE ENCOUNTER
----- Message from Jaquelin Kevon sent at 8/14/2020 11:30 AM CDT -----  Type:  Needs Medical Advice    Who Called: Crow Green  Would the patient rather a call back or a response via [facility]? Call back   Best Call Back Number: [phone] (cell)   Additional Information: Patient is requesting for Doctor's orders for a motorized gipson chair. Patient will like recommendations on a place that has motorized wheel chairs. Patient is requesting for Nurse Louise to call him.

## 2020-08-17 ENCOUNTER — OFFICE VISIT (OUTPATIENT)
Dept: PODIATRY | Facility: CLINIC | Age: 65
End: 2020-08-17
Payer: MEDICARE

## 2020-08-17 VITALS
WEIGHT: 190 LBS | SYSTOLIC BLOOD PRESSURE: 113 MMHG | HEIGHT: 65 IN | BODY MASS INDEX: 31.65 KG/M2 | DIASTOLIC BLOOD PRESSURE: 68 MMHG | HEART RATE: 85 BPM

## 2020-08-17 DIAGNOSIS — Z89.412 STATUS POST AMPUTATION OF LEFT GREAT TOE: Primary | ICD-10-CM

## 2020-08-17 DIAGNOSIS — G89.18 ACUTE POST-OPERATIVE PAIN: ICD-10-CM

## 2020-08-17 DIAGNOSIS — E11.49 TYPE II DIABETES MELLITUS WITH NEUROLOGICAL MANIFESTATIONS: ICD-10-CM

## 2020-08-17 DIAGNOSIS — T87.81 DEHISCENCE OF AMPUTATION STUMP: ICD-10-CM

## 2020-08-17 PROCEDURE — 99999 PR PBB SHADOW E&M-EST. PATIENT-LVL III: CPT | Mod: PBBFAC,HCNC,, | Performed by: PODIATRIST

## 2020-08-17 PROCEDURE — 99024 POSTOP FOLLOW-UP VISIT: CPT | Mod: HCNC,S$GLB,, | Performed by: PODIATRIST

## 2020-08-17 PROCEDURE — 99999 PR PBB SHADOW E&M-EST. PATIENT-LVL III: ICD-10-PCS | Mod: PBBFAC,HCNC,, | Performed by: PODIATRIST

## 2020-08-17 PROCEDURE — 99024 PR POST-OP FOLLOW-UP VISIT: ICD-10-PCS | Mod: HCNC,S$GLB,, | Performed by: PODIATRIST

## 2020-08-17 RX ORDER — HYDROCODONE BITARTRATE AND ACETAMINOPHEN 10; 325 MG/1; MG/1
1 TABLET ORAL EVERY 8 HOURS PRN
Qty: 20 TABLET | Refills: 0 | Status: SHIPPED | OUTPATIENT
Start: 2020-08-17 | End: 2020-08-24 | Stop reason: SDUPTHER

## 2020-08-17 NOTE — PROGRESS NOTES
Subjective:     Patient ID: Crow Green is a 65 y.o. male.    Chief Complaint: Post-op Evaluation (c/o pain to left foot. rates pain 7/10. wears post op shoe with dressing. diabetic Pt. PCP Dr. Lambert.)    HPI: This 65 year old male returns to the clinic 1 months status post left hallux amputation procedure. Patient has no complaints of fever chills or sweats. Positive pain. Patient states dressing was kept dry, clean, and intact. Patient states home health has been coming out. Patient has no other pedal complaints today.    Patient Active Problem List   Diagnosis    ED (erectile dysfunction)    Non-proliferative diabetic retinopathy, mild, both eyes    Hypertension associated with diabetes    Diabetic polyneuropathy    Nonobstructive coronary artery disease of native artery of native heart    Chronic combined systolic and diastolic HFrEF of 45%    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, with long-term current use of insulin    SANDRITA on CPAP    Moderate asthma    Psychophysiological insomnia    Nightmares    Sleep related gastroesophageal reflux disease    Inadequate sleep hygiene    PAF (paroxysmal atrial fibrillation)    At risk for medication noncompliance    Obesity (BMI 30.0-34.9)    Indeterminate stage chronic open angle glaucoma    Right hip pain    Gait instability    Chronic right shoulder pain    Arthritis    Left sided sciatica    Dyspnea on exertion    Osteoarthritis of spine with radiculopathy, lumbar region    HNP (herniated nucleus pulposus), lumbar    Gastroesophageal reflux disease without esophagitis    Chronic diastolic heart failure    Hypogonadism in male    Disorder of parathyroid gland    Hyperlipidemia associated with type 2 diabetes mellitus    JOSE MARIA (acute kidney injury)    LRTI (lower respiratory tract infection)    Physical deconditioning    Acute pain of left shoulder    Traumatic tear of  left rotator cuff    Atrial fibrillation with RVR    Other chronic pain    Left shoulder pain    Complete tear of left rotator cuff    Stage 3 chronic kidney disease    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 associated with type 2 diabetes mellitus    Lumbar radiculopathy    Diabetic ulcer of toe of left foot associated with type 2 diabetes mellitus, with necrosis of muscle    Ulcer of left foot    Elevated troponin    Hypotension    Dermatomyositis    Postprocedural hypotension    Weakness generalized    Advanced directives, counseling/discussion    Cocaine dependence, in remission    Schizoaffective disorder, depressive type    Chronic obstructive pulmonary disease       Medication List with Changes/Refills   Current Medications    ASPIRIN (ECOTRIN) 81 MG EC TABLET    Take 1 tablet (81 mg total) by mouth once daily.    ATORVASTATIN (LIPITOR) 40 MG TABLET    TAKE 1 TABLET EVERY EVENING    BACLOFEN (LIORESAL) 10 MG TABLET    Take 1 tablet (10 mg total) by mouth once daily.    BLOOD SUGAR DIAGNOSTIC (TRUE METRIX GLUCOSE TEST STRIP) STRP    Use with blood glucose meter kit to test blood sugar four times  daily    BLOOD-GLUCOSE METER (TRUE METRIX AIR GLUCOSE METER) KIT    TEST FOUR TIMES DAILY BEFORE MEALS  AND EVERY NIGHT    DICLOFENAC SODIUM (VOLTAREN) 1 % GEL    Apply 2 g topically 4 (four) times daily.    FLUTICASONE-SALMETEROL DISKUS INHALER 250-50 MCG    Inhale 1 puff into the lungs 2 (two) times daily. Controller    FOOD SUPPLEMT, LACTOSE-REDUCED (ENSURE) LIQD    Take 118 mLs by mouth 3 (three) times daily with meals.    GABAPENTIN (NEURONTIN) 800 MG TABLET    Take 1 tablet (800 mg total) by mouth 3 (three) times daily.    GLIMEPIRIDE (AMARYL) 2 MG TABLET    TAKE 1 TABLET (2 MG TOTAL) BY MOUTH BEFORE BREAKFAST.    INHALATION SPACING DEVICE    Use as directed for inhalation.    INSULIN (LANTUS SOLOSTAR U-100 INSULIN) GLARGINE 100 UNITS/ML (3ML) SUBQ PEN     "INJECT 35 UNITS SUBCUTANEOUSLY EVERY EVENING    INSULIN SYRINGE-NEEDLE U-100 1 ML 31 GAUGE X 5/16 SYRG        IPRATROPIUM (ATROVENT) 0.02 % NEBULIZER SOLUTION    USE 1 VIAL VIA NEBULIZER FOUR TIMES DAILY    LANCETS 32 GAUGE MISC    1 lancet by Misc.(Non-Drug; Combo Route) route 2 (two) times daily.    LATANOPROST 0.005 % OPHTHALMIC SOLUTION    INSTILL 1 DROP INTO BOTH EYES EVERY EVENING    MAGNESIUM OXIDE (MAG-OX) 400 MG (241.3 MG MAGNESIUM) TABLET    Take 1 tablet (400 mg total) by mouth once daily.    METFORMIN (GLUCOPHAGE-XR) 500 MG XR 24HR TABLET    Take 2 tablets (1,000 mg total) by mouth 2 (two) times daily with meals.    METOPROLOL SUCCINATE (TOPROL-XL) 50 MG 24 HR TABLET    Take 1 tablet (50 mg total) by mouth once daily.    NITROGLYCERIN (NITROSTAT) 0.3 MG SL TABLET    PLACE 1 TABLET UNDER THE TONGUE EVERY 5 MINUTES AS NEEDED FOR CHEST PAIN, NO MORE THAN 3 TABLETS IN ONE DAY    PANTOPRAZOLE (PROTONIX) 40 MG TABLET    Take 1 tablet (40 mg total) by mouth once daily.    PEN NEEDLE, DIABETIC (BD ULTRA-FINE SHORT PEN NEEDLE) 31 GAUGE X 5/16" NDLE    Inject 1 each into the skin 3 (three) times daily.    PREDNISONE (DELTASONE) 5 MG TABLET    Take 1 tablet (5 mg total) by mouth 2 (two) times daily.    SERTRALINE (ZOLOFT) 100 MG TABLET    Take 1 tablet (100 mg total) by mouth every evening.    SILVER SULFADIAZINE 1% (SILVADENE) 1 % CREAM    Apply topically 2 (two) times daily.    TAMSULOSIN (FLOMAX) 0.4 MG CAP    Take 1 capsule (0.4 mg total) by mouth once daily.    TIOTROPIUM (SPIRIVA WITH HANDIHALER) 18 MCG INHALATION CAPSULE    Inhale 1 capsule (18 mcg total) into the lungs once daily. Controller    TRAZODONE (DESYREL) 100 MG TABLET    Take 2 tablets (200 mg total) by mouth every evening.    VIOS AEROSOL DELIVERY SYSTEM VIN    USE AS DIRESTED   Changed and/or Refilled Medications    Modified Medication Previous Medication    ALCOHOL SWABS (ALCOHOL PREP PADS) PADM alcohol swabs (ALCOHOL PREP PADS) PadM       " Apply 1 each topically once daily.    Apply 1 each topically once daily.    ALLOPURINOL (ZYLOPRIM) 100 MG TABLET allopurinoL (ZYLOPRIM) 100 MG tablet       Take 1 tablet (100 mg total) by mouth once daily.    Take 1 tablet (100 mg total) by mouth once daily.    AZATHIOPRINE (IMURAN) 50 MG TAB azaTHIOprine (IMURAN) 50 mg Tab       Take 1 tablet (50 mg total) by mouth once daily.    Take 1 tablet (50 mg total) by mouth once daily.    COLCHICINE (COLCRYS) 0.6 MG TABLET colchicine (COLCRYS) 0.6 mg tablet       Take 1 tablet (0.6 mg total) by mouth once daily.    Take 1 tablet (0.6 mg total) by mouth once daily.    ELIQUIS 5 MG TAB ELIQUIS 5 mg Tab       Take 1 tablet (5 mg total) by mouth 2 (two) times daily.    Take 1 tablet (5 mg total) by mouth 2 (two) times daily.    FUROSEMIDE (LASIX) 40 MG TABLET furosemide (LASIX) 40 MG tablet       TAKE 2 TABLETS EVERY MORNING  AND TAKE 1 TABLET EVERY EVENING    TAKE 2 TABLETS EVERY MORNING  AND TAKE 1 TABLET EVERY EVENING    HYDROCODONE-ACETAMINOPHEN (NORCO)  MG PER TABLET HYDROcodone-acetaminophen (NORCO)  mg per tablet       Take 1 tablet by mouth every 8 (eight) hours as needed for Pain.    Take 1 tablet by mouth every 8 (eight) hours as needed for Pain.    INSULIN ASPART U-100 (NOVOLOG FLEXPEN U-100 INSULIN) 100 UNIT/ML (3 ML) INPN PEN insulin aspart U-100 (NOVOLOG FLEXPEN U-100 INSULIN) 100 unit/mL (3 mL) InPn pen       INJECT 10 UNITS SUBCUTANEOUSLY THREE TIMES DAILY WITH MEALS    INJECT 10 UNITS SUBCUTANEOUSLY THREE TIMES DAILY WITH MEALS    ISOSORBIDE MONONITRATE (IMDUR) 60 MG 24 HR TABLET isosorbide mononitrate (IMDUR) 60 MG 24 hr tablet       Take 1 tablet (60 mg total) by mouth once daily.    Take 1 tablet (60 mg total) by mouth once daily.    LISINOPRIL 10 MG TABLET lisinopriL 10 MG tablet       Take 1 tablet (10 mg total) by mouth once daily.    Take 10 mg by mouth once daily.    OZEMPIC 1 MG/DOSE (2 MG/1.5 ML) PNIJ OZEMPIC 1 mg/dose (2 mg/1.5 mL) PnIj        Inject 0.75 mLs into the skin once a week.       Discontinued Medications    ALCOHOL ANTISEPTIC PADS (ALCOHOL PREP PADS TOP)        ALLOPURINOL (ZYLOPRIM) 100 MG TABLET    Take 1 tablet (100 mg total) by mouth once daily.       Review of patient's allergies indicates:   Allergen Reactions    Protamine Hives     Urticaria, possible upper airway swelling       Past Surgical History:   Procedure Laterality Date    ABLATION OF ARRHYTHMOGENIC FOCUS FOR ATRIAL FIBRILLATION N/A 2/27/2020    Procedure: Ablation atrial fibrillation;  Surgeon: Gio Brown MD;  Location: [facility] EP LAB;  Service: Cardiology;  Laterality: N/A;  afib, DAMIEN (cx if SR), PVI, PITO, anes, MB, 3 Prep    CHOLECYSTECTOMY      CLOSURE OF WOUND Left 7/1/2020    Procedure: CLOSURE, WOUND;  Surgeon: Barb Veras DPM;  Location: [facility] OR;  Service: Podiatry;  Laterality: Left;    SELECTIVE INJECTION OF ANESTHETIC AGENT AROUND LUMBAR SPINAL NERVE ROOT BY TRANSFORAMINAL APPROACH Left 6/11/2020    Procedure: BLOCK, SPINAL NERVE ROOT, LUMBAR, SELECTIVE, TRANSFORAMINAL APPROACH Left L4-5, L5-S1 TFESI with RN IV sedation;  Surgeon: Dillan Tsai MD;  Location: [facility] PAIN MGT;  Service: Pain Management;  Laterality: Left;    TOE AMPUTATION Left 6/29/2020    Procedure: AMPUTATION, TOE;  Surgeon: Barb Veras DPM;  Location: [facility] OR;  Service: Podiatry;  Laterality: Left;  great toe       Family History   Problem Relation Age of Onset    Glaucoma Mother     Cataracts Mother     Cataracts Father     Glaucoma Sister     Cataracts Sister     Glaucoma Maternal Aunt     No Known Problems Brother     No Known Problems Daughter     No Known Problems Son     Prostate cancer Neg Hx        Social History     Socioeconomic History    Marital status: Legally      Spouse name: Not on file    Number of children: 5    Years of education: Not on file    Highest education level: Not on file   Occupational History    Occupation: disabled  "  Social Needs    Financial resource strain: Not on file    Food insecurity     Worry: Not on file     Inability: Not on file    Transportation needs     Medical: Yes     Non-medical: Not on file   Tobacco Use    Smoking status: Never Smoker    Smokeless tobacco: Never Used   Substance and Sexual Activity    Alcohol use: Not Currently    Drug use: No    Sexual activity: Yes     Partners: Female   Lifestyle    Physical activity     Days per week: Not on file     Minutes per session: Not on file    Stress: Rather much   Relationships    Social connections     Talks on phone: Not on file     Gets together: Not on file     Attends [employer] service: Not on file     Active member of club or organization: Not on file     Attends meetings of clubs or organizations: Not on file     Relationship status: Not on file   Other Topics Concern    Not on file   Social History Narrative    Not on file       Vitals:    08/17/20 0906   BP: 113/68   Pulse: 85   Weight: 86.2 kg (190 lb)   Height: 5' 5" (1.651 m)   PainSc:   7   PainLoc: Foot       Review of Systems   Constitutional: Negative for chills and fever.   Respiratory: Negative for shortness of breath.    Cardiovascular: Negative for chest pain, palpitations, orthopnea, claudication and leg swelling.   Gastrointestinal: Negative for diarrhea, nausea and vomiting.   Musculoskeletal: Negative for joint pain.   Skin: Negative for rash.   Neurological: Positive for tingling and sensory change.   Psychiatric/Behavioral: Negative.              Objective:      Physical examination: General: Patient is in no acute distress, alert and oriented x 3.  Dressing to left foot clean, dry, and intact.   Patient presents ambulating in surgical shoe with walker.    Lower Extremity Exam:  Vascular: Dorsalis pedis and Posterior tibial pulses palpable on left foot.  Capillary fill time <4 sec to toes on left foot. Minimal edema noted on left foot.   Dermatologic: Wound dehiscence " measurement is [id number]. (+) decreased exposed bone with increased granular tissue. Negative erythema, drainage, or increased temp noted to surgical site.   Neurological: Light touch sensation intact to left foot.   Musculoskeletal: Negative pain on palpation/ROM of left foot. Left hallux amputation noted.                     Assessment:       Encounter Diagnoses   Name Primary?   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  Dispense: 20 tablet; Refill: 0    Dehiscence of amputation stump - Left Foot  -     Discontinue: HYDROcodone-acetaminophen (NORCO)  mg per tablet; Take 1 tablet by mouth every 8 (eight) hours as needed for Pain.  Dispense: 20 tablet; Refill: 0    Acute post-operative pain - Left Foot  -     Discontinue: HYDROcodone-acetaminophen (NORCO)  mg per tablet; Take 1 tablet by mouth every 8 (eight) hours as needed for Pain.  Dispense: 20 tablet; Refill: 0    Type II diabetes mellitus with neurological manifestations      I counseled the patient on his conditions, regarding findings of my examination, my impressions, and usual treatment plan.   Left foot edged painted with Gentian Violet and packed with  1/4' iodoform packing and dressed with, gauze, kerlix, and ACE.   Patient instructed to keep dressing dry, clean and intact until home health comes out.   Patient instructed to continue to ambulate in surgical shoe and walker.   Refill completed for Norco 10-325mg to be taken as needed for pain. Patient advised on the possible elevation of blood pressure or heart effects and caution to take pills as needed and to  discontinue use if symptoms arise, patient agreed.   Wound vac form completed again.   Patient should call the clinic immediately if any signs of infection such as fever chills sweats increased redness or pain.  Patient to return in 1 week or sooner if needed.               Barb Veras DPM  [facility]

## 2020-08-18 ENCOUNTER — OUTPATIENT CASE MANAGEMENT (OUTPATIENT)
Dept: ADMINISTRATIVE | Facility: OTHER | Age: 65
End: 2020-08-18

## 2020-08-18 RX ORDER — APIXABAN 5 MG/1
5 TABLET, FILM COATED ORAL 2 TIMES DAILY
Qty: 60 TABLET | Refills: 0 | Status: SHIPPED | OUTPATIENT
Start: 2020-08-18 | End: 2020-09-09

## 2020-08-18 RX ORDER — ISOSORBIDE MONONITRATE 60 MG/1
60 TABLET, EXTENDED RELEASE ORAL DAILY
Qty: 90 TABLET | Refills: 3 | Status: SHIPPED | OUTPATIENT
Start: 2020-08-18 | End: 2021-03-23 | Stop reason: SDUPTHER

## 2020-08-18 RX ORDER — FUROSEMIDE 40 MG/1
TABLET ORAL
Qty: 270 TABLET | Refills: 0 | Status: SHIPPED | OUTPATIENT
Start: 2020-08-18 | End: 2020-10-15

## 2020-08-18 RX ORDER — LISINOPRIL 10 MG/1
10 TABLET ORAL DAILY
Qty: 90 TABLET | Refills: 4 | Status: SHIPPED | OUTPATIENT
Start: 2020-08-18 | End: 2020-09-01 | Stop reason: DRUGHIGH

## 2020-08-18 RX ORDER — SEMAGLUTIDE 1.34 MG/ML
0.75 INJECTION, SOLUTION SUBCUTANEOUS WEEKLY
Qty: 3 ML | Refills: 11 | Status: SHIPPED | OUTPATIENT
Start: 2020-08-18 | End: 2021-03-23 | Stop reason: SDUPTHER

## 2020-08-18 NOTE — TELEPHONE ENCOUNTER
Spoke with pt about contacting [employer] for paperwork for mobile chair. Phone number provided to pt

## 2020-08-18 NOTE — TELEPHONE ENCOUNTER
----- Message from Isabel Grayson sent at 8/18/2020  1:58 PM CDT -----  Regarding: refill  Type:  RX Refill Request    Who Called:  Pt   Refill or New Rx: refill   RX Name and Strength: all Rx's   How is the patient currently taking it? (ex. 1XDay): unknown   Is this a 30 day or 90 day RX: 90 day   Preferred Pharmacy with phone number:   [facility] - [facility] [address] [facility] [address] [facility]  [address] [address]  Phone: [phone] Fax: [phone]  Local or Mail Order: mail order   Ordering Provider: weeks   Would the patient rather a call back or a response via [facility]? Call back   Best Call Back Number: [phone]  Additional Information: n/a

## 2020-08-18 NOTE — PROGRESS NOTES
Outpatient Care Management   - Care Plan Follow Up    Patient: Crow Green  MRN:  [id number]  Date of Service:  8/18/2020  Completed by:  Amanda Whipple LCSW  Referral Date: 06/29/2020  Program: Case Management (High Risk)    Reason for Visit   Patient presents with    Update Plan Of Care     8/18/20       Brief Summary: Received phone call from patient. Patient received resources. Discussed [facility] Program. Patient stated that he is very interested in the program, citing that it is depressing to come home and not be able to make the home repairs needed himself. He said that with the toe amputation and the fall, he has had a decline in his ability to complete the home maintenance. [facility] contact about the program.     Complex Care Plan     Care plan was discussed and completed today with input from patient and/or caregiver.    Patient Instructions     Instructions were provided via the [employer] patient [employer] and are available for the patient to view on the patient portal.    Follow up in about 1 week (around 8/25/2020) for Update on [employer]. .    Todays OPCM Self-Management Care Plan was developed with the patients/caregivers input and was based on identified barriers from todays assessment.  Goals were written today with the patient/caregiver and the patient has agreed to work towards these goals to improve his/her overall well-being. Patient verbalized understanding of the care plan, goals, and all of today's instructions. Encouraged patient/caregiver to communicate with his/her physician and health care team about health conditions and the treatment plan.  Provided my contact information today and encouraged patient/caregiver to call me with any questions as needed.

## 2020-08-19 ENCOUNTER — OUTPATIENT CASE MANAGEMENT (OUTPATIENT)
Dept: ADMINISTRATIVE | Facility: OTHER | Age: 65
End: 2020-08-19

## 2020-08-20 ENCOUNTER — OFFICE VISIT (OUTPATIENT)
Dept: ORTHOPEDICS | Facility: CLINIC | Age: 65
End: 2020-08-20
Payer: MEDICARE

## 2020-08-20 VITALS
HEART RATE: 83 BPM | BODY MASS INDEX: 31.67 KG/M2 | WEIGHT: 190.06 LBS | DIASTOLIC BLOOD PRESSURE: 71 MMHG | SYSTOLIC BLOOD PRESSURE: 115 MMHG | HEIGHT: 65 IN

## 2020-08-20 DIAGNOSIS — G89.29 OTHER CHRONIC PAIN: ICD-10-CM

## 2020-08-20 DIAGNOSIS — M25.512 LEFT SHOULDER PAIN, UNSPECIFIED CHRONICITY: Primary | ICD-10-CM

## 2020-08-20 DIAGNOSIS — S46.012D TRAUMATIC INCOMPLETE TEAR OF LEFT ROTATOR CUFF, SUBSEQUENT ENCOUNTER: ICD-10-CM

## 2020-08-20 PROCEDURE — 99213 PR OFFICE/OUTPT VISIT, EST, LEVL III, 20-29 MIN: ICD-10-PCS | Mod: HCNC,S$GLB,, | Performed by: FAMILY MEDICINE

## 2020-08-20 PROCEDURE — 99999 PR PBB SHADOW E&M-EST. PATIENT-LVL V: ICD-10-PCS | Mod: PBBFAC,HCNC,, | Performed by: FAMILY MEDICINE

## 2020-08-20 PROCEDURE — 3074F PR MOST RECENT SYSTOLIC BLOOD PRESSURE < 130 MM HG: ICD-10-PCS | Mod: HCNC,CPTII,S$GLB, | Performed by: FAMILY MEDICINE

## 2020-08-20 PROCEDURE — 3008F PR BODY MASS INDEX (BMI) DOCUMENTED: ICD-10-PCS | Mod: HCNC,CPTII,S$GLB, | Performed by: FAMILY MEDICINE

## 2020-08-20 PROCEDURE — 3078F PR MOST RECENT DIASTOLIC BLOOD PRESSURE < 80 MM HG: ICD-10-PCS | Mod: HCNC,CPTII,S$GLB, | Performed by: FAMILY MEDICINE

## 2020-08-20 PROCEDURE — 3078F DIAST BP <80 MM HG: CPT | Mod: HCNC,CPTII,S$GLB, | Performed by: FAMILY MEDICINE

## 2020-08-20 PROCEDURE — 99213 OFFICE O/P EST LOW 20 MIN: CPT | Mod: HCNC,S$GLB,, | Performed by: FAMILY MEDICINE

## 2020-08-20 PROCEDURE — 3074F SYST BP LT 130 MM HG: CPT | Mod: HCNC,CPTII,S$GLB, | Performed by: FAMILY MEDICINE

## 2020-08-20 PROCEDURE — 99999 PR PBB SHADOW E&M-EST. PATIENT-LVL V: CPT | Mod: PBBFAC,HCNC,, | Performed by: FAMILY MEDICINE

## 2020-08-20 PROCEDURE — 3051F HG A1C>EQUAL 7.0%<8.0%: CPT | Mod: HCNC,CPTII,S$GLB, | Performed by: FAMILY MEDICINE

## 2020-08-20 PROCEDURE — 3008F BODY MASS INDEX DOCD: CPT | Mod: HCNC,CPTII,S$GLB, | Performed by: FAMILY MEDICINE

## 2020-08-20 PROCEDURE — 3051F PR MOST RECENT HEMOGLOBIN A1C LEVEL 7.0 - < 8.0%: ICD-10-PCS | Mod: HCNC,CPTII,S$GLB, | Performed by: FAMILY MEDICINE

## 2020-08-20 NOTE — PROGRESS NOTES
Subjective:     Patient ID: Crow Green is a 65 y.o. male.    Chief Complaint: Pain of the Left Shoulder      HPI:  Patient states there is no change with his left shoulder knee reports 9/10 pain.  It affects his ADLs and sleep.    He is on therapy from pain management and also is being treated by Podiatry and has a bandage on the left foot today.    He is working to keep his blood sugars under control.  He had a cortisone injection left shoulder last visit and stated helped only for a few weeks.    He has not had physical therapy for the shoulder in the last year to but he is willing to go and then if he is not doing better in 2-3 months we will refer to orthopedics shoulder surgeon for evaluation.  He states his neck is doing better and that he had his cardiac procedure and is doing better from a cardiac standpoint.    Past Medical History:   Diagnosis Date    Arthritis     Asthma     Atrial fibrillation     Atrial fibrillation with RVR 8/7/2019    CHF (congestive heart failure)     Chronic obstructive pulmonary disease 8/5/2020    Depression     Dermatomyositis     Diabetes mellitus, type 2 Diagnosed in 2000    Elevated PSA     Follow up 7/30/2020    Hypertension     Sleep apnea      Past Surgical History:   Procedure Laterality Date    ABLATION OF ARRHYTHMOGENIC FOCUS FOR ATRIAL FIBRILLATION N/A 2/27/2020    Procedure: Ablation atrial fibrillation;  Surgeon: Gio Brown MD;  Location: [facility];  Service: Cardiology;  Laterality: N/A;  afib, DAMIEN (cx if SR), PVI, PITO, anes, MB, 3 Prep    CHOLECYSTECTOMY      CLOSURE OF WOUND Left 7/1/2020    Procedure: CLOSURE, WOUND;  Surgeon: Barb Veras DPM;  Location: [facility];  Service: Podiatry;  Laterality: Left;    SELECTIVE INJECTION OF ANESTHETIC AGENT AROUND LUMBAR SPINAL NERVE ROOT BY TRANSFORAMINAL APPROACH Left 6/11/2020    Procedure: BLOCK, SPINAL NERVE ROOT, LUMBAR, SELECTIVE, TRANSFORAMINAL APPROACH Left L4-5, L5-S1 TFESI  with RN IV sedation;  Surgeon: Dillan Tsai MD;  Location: [facility];  Service: Pain Management;  Laterality: Left;    TOE AMPUTATION Left 6/29/2020    Procedure: AMPUTATION, TOE;  Surgeon: Barb Veras DPM;  Location: [facility];  Service: Podiatry;  Laterality: Left;  great toe     Family History   Problem Relation Age of Onset    Glaucoma Mother     Cataracts Mother     Cataracts Father     Glaucoma Sister     Cataracts Sister     Glaucoma Maternal Aunt     No Known Problems Brother     No Known Problems Daughter     No Known Problems Son     Prostate cancer Neg Hx      Social History     Socioeconomic History    Marital status: Legally      Spouse name: Not on file    Number of children: 5    Years of education: Not on file    Highest education level: Not on file   Occupational History    Occupation: disabled   Social Needs    Financial resource strain: Not on file    Food insecurity     Worry: Not on file     Inability: Not on file    Transportation needs     Medical: Yes     Non-medical: Not on file   Tobacco Use    Smoking status: Never Smoker    Smokeless tobacco: Never Used   Substance and Sexual Activity    Alcohol use: Not Currently    Drug use: No    Sexual activity: Yes     Partners: Female   Lifestyle    Physical activity     Days per week: Not on file     Minutes per session: Not on file    Stress: Rather much   Relationships    Social connections     Talks on phone: Not on file     Gets together: Not on file     Attends [employer] service: Not on file     Active member of club or organization: Not on file     Attends meetings of clubs or organizations: Not on file     Relationship status: Not on file   Other Topics Concern    Not on file   Social History Narrative    Not on file       Current Outpatient Medications:     ALCOHOL ANTISEPTIC PADS (ALCOHOL PREP PADS TOP), , Disp: , Rfl:     allopurinoL (ZYLOPRIM) 100 MG tablet, Take 1 tablet (100 mg total) by  mouth once daily., Disp: 30 tablet, Rfl: 0    aspirin (ECOTRIN) 81 MG EC tablet, Take 1 tablet (81 mg total) by mouth once daily., Disp: , Rfl: 0    atorvastatin (LIPITOR) 40 MG tablet, TAKE 1 TABLET EVERY EVENING, Disp: 90 tablet, Rfl: 4    azaTHIOprine (IMURAN) 50 mg Tab, Take 1 tablet (50 mg total) by mouth once daily., Disp: 90 tablet, Rfl: 0    baclofen (LIORESAL) 10 MG tablet, Take 1 tablet (10 mg total) by mouth once daily., Disp: 90 tablet, Rfl: 3    blood sugar diagnostic (TRUE METRIX GLUCOSE TEST STRIP) Strp, Use with blood glucose meter kit to test blood sugar four times  daily, Disp: 200 strip, Rfl: 99    blood-glucose meter (TRUE METRIX AIR GLUCOSE METER) kit, TEST FOUR TIMES DAILY BEFORE MEALS  AND EVERY NIGHT, Disp: 1 each, Rfl: 0    colchicine (COLCRYS) 0.6 mg tablet, Take 1 tablet (0.6 mg total) by mouth once daily., Disp: 30 tablet, Rfl: 0    diclofenac sodium (VOLTAREN) 1 % Gel, Apply 2 g topically 4 (four) times daily., Disp: 1 Tube, Rfl: 5    ELIQUIS 5 mg Tab, Take 1 tablet (5 mg total) by mouth 2 (two) times daily., Disp: 60 tablet, Rfl: 0    fluticasone-salmeterol diskus inhaler 250-50 mcg, Inhale 1 puff into the lungs 2 (two) times daily. Controller, Disp: 60 each, Rfl: 11    food supplemt, lactose-reduced (ENSURE) Liqd, Take 118 mLs by mouth 3 (three) times daily with meals., Disp: 1 Bottle, Rfl: 4    furosemide (LASIX) 40 MG tablet, TAKE 2 TABLETS EVERY MORNING  AND TAKE 1 TABLET EVERY EVENING, Disp: 270 tablet, Rfl: 0    gabapentin (NEURONTIN) 800 MG tablet, Take 1 tablet (800 mg total) by mouth 3 (three) times daily., Disp: 270 tablet, Rfl: 4    glimepiride (AMARYL) 2 MG tablet, TAKE 1 TABLET (2 MG TOTAL) BY MOUTH BEFORE BREAKFAST., Disp: 90 tablet, Rfl: 3    HYDROcodone-acetaminophen (NORCO)  mg per tablet, Take 1 tablet by mouth every 8 (eight) hours as needed for Pain., Disp: 20 tablet, Rfl: 0    inhalation spacing device, Use as directed for inhalation., Disp: 1  "Device, Rfl: 0    insulin (LANTUS SOLOSTAR U-100 INSULIN) glargine 100 units/mL (3mL) SubQ pen, INJECT 35 UNITS SUBCUTANEOUSLY EVERY EVENING, Disp: 75 mL, Rfl: 4    insulin aspart U-100 (NOVOLOG FLEXPEN U-100 INSULIN) 100 unit/mL (3 mL) InPn pen, INJECT 10 UNITS SUBCUTANEOUSLY THREE TIMES DAILY WITH MEALS, Disp: 15 mL, Rfl: 0    insulin syringe-needle U-100 1 mL 31 gauge x 5/16 Syrg, , Disp: , Rfl:     ipratropium (ATROVENT) 0.02 % nebulizer solution, USE 1 VIAL VIA NEBULIZER FOUR TIMES DAILY, Disp: 937.5 mL, Rfl: 2    isosorbide mononitrate (IMDUR) 60 MG 24 hr tablet, Take 1 tablet (60 mg total) by mouth once daily., Disp: 90 tablet, Rfl: 3    lancets 32 gauge Misc, 1 lancet by Misc.(Non-Drug; Combo Route) route 2 (two) times daily., Disp: 100 each, Rfl: 11    latanoprost 0.005 % ophthalmic solution, INSTILL 1 DROP INTO BOTH EYES EVERY EVENING, Disp: 1 Bottle, Rfl: 2    lisinopriL 10 MG tablet, Take 1 tablet (10 mg total) by mouth once daily., Disp: 90 tablet, Rfl: 4    magnesium oxide (MAG-OX) 400 mg (241.3 mg magnesium) tablet, Take 1 tablet (400 mg total) by mouth once daily., Disp: , Rfl: 0    metFORMIN (GLUCOPHAGE-XR) 500 MG XR 24hr tablet, Take 2 tablets (1,000 mg total) by mouth 2 (two) times daily with meals., Disp: 360 tablet, Rfl: 3    metoprolol succinate (TOPROL-XL) 50 MG 24 hr tablet, Take 1 tablet (50 mg total) by mouth once daily., Disp: 90 tablet, Rfl: 3    nitroGLYCERIN (NITROSTAT) 0.3 MG SL tablet, PLACE 1 TABLET UNDER THE TONGUE EVERY 5 MINUTES AS NEEDED FOR CHEST PAIN, NO MORE THAN 3 TABLETS IN ONE DAY, Disp: 100 tablet, Rfl: 0    OZEMPIC 1 mg/dose (2 mg/1.5 mL) PnIj, Inject 0.75 mLs into the skin once a week., Disp: 3 mL, Rfl: 11    pantoprazole (PROTONIX) 40 MG tablet, Take 1 tablet (40 mg total) by mouth once daily., Disp: 30 tablet, Rfl: 11    pen needle, diabetic (BD ULTRA-FINE SHORT PEN NEEDLE) 31 gauge x 5/16" Ndle, Inject 1 each into the skin 3 (three) times daily., Disp: " 100 each, Rfl: 11    predniSONE (DELTASONE) 5 MG tablet, Take 1 tablet (5 mg total) by mouth 2 (two) times daily., Disp: 60 tablet, Rfl: 0    sertraline (ZOLOFT) 100 MG tablet, Take 1 tablet (100 mg total) by mouth every evening., Disp: 90 tablet, Rfl: 4    silver sulfADIAZINE 1% (SILVADENE) 1 % cream, Apply topically 2 (two) times daily., Disp: 50 g, Rfl: 0    tamsulosin (FLOMAX) 0.4 mg Cap, Take 1 capsule (0.4 mg total) by mouth once daily., Disp: 30 capsule, Rfl: 11    traZODone (DESYREL) 100 MG tablet, Take 2 tablets (200 mg total) by mouth every evening., Disp: 180 tablet, Rfl: 1    VIOS AEROSOL DELIVERY SYSTEM Shefali, USE AS DIRESTED, Disp: , Rfl: 0    tiotropium (SPIRIVA WITH HANDIHALER) 18 mcg inhalation capsule, Inhale 1 capsule (18 mcg total) into the lungs once daily. Controller, Disp: 90 capsule, Rfl: 0  Review of patient's allergies indicates:   Allergen Reactions    Protamine Hives     Urticaria, possible upper airway swelling     Review of Systems   Constitutional: Negative for chills, fever and weight loss.   Respiratory: Negative for shortness of breath.    Cardiovascular: Negative for chest pain and palpitations.       Objective:   Body mass index is 31.62 kg/m².  Vitals:    08/20/20 1120   BP: 115/71   Pulse: 83           Ortho/SPM Exam alert and oriented well-nourished well-developed in no acute distress but moans in pain whenever he moves his shoulder or his left foot which is wrapped in a bandage    Respiratory effort is normal    Left shoulder no acute deformity    Exam is unchanged from last visit only has about 70° of active abduction and flexion in very limited internal rotation.    Has tenderness diffusely to the anterior lateral aspects of the shoulder    Neurovascular intact for the shoulder region    Psychiatric good affect and cognition    IMAGING: X-Ray Chest PA And Lateral  Narrative: EXAMINATION:  XR CHEST PA AND LATERAL    CLINICAL HISTORY:  Moderate persistent asthma,  uncomplicated    TECHNIQUE:  PA and lateral views of the chest were performed.    COMPARISON:  08/05/2020    FINDINGS:  The cardiac and mediastinal silhouettes appear within normal limits.   Scarring versus subsegmental atelectasis in the right lung base remains unchanged.  No new pulmonary findings.  No acute osseous findings demonstrated.  Impression: No acute findings.    Electronically signed by: Jame Stephens MD  Date:    08/11/2020  Time:    08:04       Radiographs / Imaging : Relevant imaging results reviewed by me and interpreted by me, discussed with the patient and / or family       Assessment:     Encounter Diagnoses   Name Primary?   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  Patient was given the opportunity to ask questions and be an active participant in their medical care. Patient had no further questions or concerns at this time.     The patient was encouraged to participate in appropriate physical activity.      Cisco Luciano M.D.  [facility]        This note was dictated using voice recognition software and may have sound a like errors.

## 2020-08-20 NOTE — PATIENT INSTRUCTIONS
Physical therapy at [place] for left shoulder    Recheck here in 2-3 months    Continue pain management    Continue to work on blood sugar

## 2020-08-21 ENCOUNTER — TELEPHONE (OUTPATIENT)
Dept: ORTHOPEDICS | Facility: CLINIC | Age: 65
End: 2020-08-21

## 2020-08-21 DIAGNOSIS — E11.3299 TYPE 2 DIABETES MELLITUS WITH MILD NONPROLIFERATIVE RETINOPATHY, WITH LONG-TERM CURRENT USE OF INSULIN, MACULAR EDEMA PRESENCE UNSPECIFIED, UNSPECIFIED LATERALITY: ICD-10-CM

## 2020-08-21 DIAGNOSIS — M33.13 DERMATOMYOSITIS: ICD-10-CM

## 2020-08-21 DIAGNOSIS — Z79.4 TYPE 2 DIABETES MELLITUS WITH MILD NONPROLIFERATIVE RETINOPATHY, WITH LONG-TERM CURRENT USE OF INSULIN, MACULAR EDEMA PRESENCE UNSPECIFIED, UNSPECIFIED LATERALITY: ICD-10-CM

## 2020-08-21 RX ORDER — ISOPROPYL ALCOHOL 70 ML/100ML
1 SWAB TOPICAL DAILY
Qty: 100 EACH | Refills: 0 | COMMUNITY
Start: 2020-08-21 | End: 2020-08-24 | Stop reason: SDUPTHER

## 2020-08-21 RX ORDER — ALLOPURINOL 100 MG/1
100 TABLET ORAL DAILY
Qty: 30 TABLET | Refills: 0 | OUTPATIENT
Start: 2020-08-21 | End: 2020-08-24 | Stop reason: SDUPTHER

## 2020-08-21 RX ORDER — AZATHIOPRINE 50 MG/1
50 TABLET ORAL DAILY
Qty: 90 TABLET | Refills: 0 | Status: SHIPPED | OUTPATIENT
Start: 2020-08-21 | End: 2021-03-23 | Stop reason: SDUPTHER

## 2020-08-21 RX ORDER — INSULIN ASPART 100 [IU]/ML
INJECTION, SOLUTION INTRAVENOUS; SUBCUTANEOUS
Qty: 15 ML | Refills: 0
Start: 2020-08-21 | End: 2021-02-22 | Stop reason: SDUPTHER

## 2020-08-21 NOTE — TELEPHONE ENCOUNTER
----- Message from Prisca Zamarripa sent at 8/21/2020 12:13 PM CDT -----  Regarding: in house theraphy  Type:  Needs Medical Advice    Who Called: pt  Symptoms (please be specific): n/a   How long has patient had these symptoms:  n/a  Pharmacy name and phone #:  n/a  Would the patient rather a call back or a response via [facility]? Call back  Best Call Back Number: [phone]  Additional Information: Please call back pt in reference to in house therapy. Thanks

## 2020-08-21 NOTE — TELEPHONE ENCOUNTER
Would like to have home health therapy but doesn't remember the name so will call back or have home health fax something over

## 2020-08-23 NOTE — PROGRESS NOTES
Subjective:     Patient ID: Crow Green is a 65 y.o. male.    Chief Complaint: Wound Care (c/o shooting pains. rates pain 9/10. wears post op shoe with dressing. diabetic Pt. last seen on 08/05/20 with PCP Dr. Lambert.)    HPI: This 65 year old male returns to the clinic 1 months status post status post left hallux amputation procedure. Patient has no complaints of fever chills or sweats. Positive pain, rates pain 9/10. Patient states dressing was kept dry, clean, and intact. Patient states home health has been coming out.  Patient has no other pedal complaints today.      Patient Active Problem List   Diagnosis    ED (erectile dysfunction)    Non-proliferative diabetic retinopathy, mild, both eyes    Hypertension associated with diabetes    Diabetic polyneuropathy    Nonobstructive coronary artery disease of native artery of native heart    Chronic combined systolic and diastolic HFrEF of 45%    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, with long-term current use of insulin    SANDRITA on CPAP    Moderate asthma    Psychophysiological insomnia    Nightmares    Sleep related gastroesophageal reflux disease    Inadequate sleep hygiene    PAF (paroxysmal atrial fibrillation)    At risk for medication noncompliance    Obesity (BMI 30.0-34.9)    Indeterminate stage chronic open angle glaucoma    Right hip pain    Gait instability    Chronic right shoulder pain    Arthritis    Left sided sciatica    Dyspnea on exertion    Osteoarthritis of spine with radiculopathy, lumbar region    HNP (herniated nucleus pulposus), lumbar    Gastroesophageal reflux disease without esophagitis    Chronic diastolic heart failure    Hypogonadism in male    Disorder of parathyroid gland    Hyperlipidemia associated with type 2 diabetes mellitus    JOSE MARIA (acute kidney injury)    LRTI (lower respiratory tract infection)    Physical deconditioning     Acute pain of left shoulder    Traumatic tear of left rotator cuff    Atrial fibrillation with RVR    Other chronic pain    Left shoulder pain    Complete tear of left rotator cuff    Stage 3 chronic kidney disease    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 associated with type 2 diabetes mellitus    Lumbar radiculopathy    Diabetic ulcer of toe of left foot associated with type 2 diabetes mellitus, with necrosis of muscle    Ulcer of left foot    Elevated troponin    Hypotension    Dermatomyositis    Postprocedural hypotension    Weakness generalized    Advanced directives, counseling/discussion    Cocaine dependence, in remission    Schizoaffective disorder, depressive type    Chronic obstructive pulmonary disease       Medication List with Changes/Refills   New Medications    HYDROCODONE-ACETAMINOPHEN (NORCO)  MG PER TABLET    Take 1 tablet by mouth every 8 (eight) hours as needed for Pain.   Current Medications    ASPIRIN (ECOTRIN) 81 MG EC TABLET    Take 1 tablet (81 mg total) by mouth once daily.    ATORVASTATIN (LIPITOR) 40 MG TABLET    TAKE 1 TABLET EVERY EVENING    BACLOFEN (LIORESAL) 10 MG TABLET    Take 1 tablet (10 mg total) by mouth once daily.    BLOOD SUGAR DIAGNOSTIC (TRUE METRIX GLUCOSE TEST STRIP) STRP    Use with blood glucose meter kit to test blood sugar four times  daily    BLOOD-GLUCOSE METER (TRUE METRIX AIR GLUCOSE METER) KIT    TEST FOUR TIMES DAILY BEFORE MEALS  AND EVERY NIGHT    DICLOFENAC SODIUM (VOLTAREN) 1 % GEL    Apply 2 g topically 4 (four) times daily.    FLUTICASONE-SALMETEROL DISKUS INHALER 250-50 MCG    Inhale 1 puff into the lungs 2 (two) times daily. Controller    FOOD SUPPLEMT, LACTOSE-REDUCED (ENSURE) LIQD    Take 118 mLs by mouth 3 (three) times daily with meals.    GABAPENTIN (NEURONTIN) 800 MG TABLET    Take 1 tablet (800 mg total) by mouth 3 (three) times daily.    GLIMEPIRIDE (AMARYL) 2 MG TABLET    TAKE 1  "TABLET (2 MG TOTAL) BY MOUTH BEFORE BREAKFAST.    INHALATION SPACING DEVICE    Use as directed for inhalation.    INSULIN (LANTUS SOLOSTAR U-100 INSULIN) GLARGINE 100 UNITS/ML (3ML) SUBQ PEN    INJECT 35 UNITS SUBCUTANEOUSLY EVERY EVENING    INSULIN SYRINGE-NEEDLE U-100 1 ML 31 GAUGE X 5/16 SYRG        IPRATROPIUM (ATROVENT) 0.02 % NEBULIZER SOLUTION    USE 1 VIAL VIA NEBULIZER FOUR TIMES DAILY    LANCETS 32 GAUGE MISC    1 lancet by Misc.(Non-Drug; Combo Route) route 2 (two) times daily.    LATANOPROST 0.005 % OPHTHALMIC SOLUTION    INSTILL 1 DROP INTO BOTH EYES EVERY EVENING    MAGNESIUM OXIDE (MAG-OX) 400 MG (241.3 MG MAGNESIUM) TABLET    Take 1 tablet (400 mg total) by mouth once daily.    METFORMIN (GLUCOPHAGE-XR) 500 MG XR 24HR TABLET    Take 2 tablets (1,000 mg total) by mouth 2 (two) times daily with meals.    METOPROLOL SUCCINATE (TOPROL-XL) 50 MG 24 HR TABLET    Take 1 tablet (50 mg total) by mouth once daily.    NITROGLYCERIN (NITROSTAT) 0.3 MG SL TABLET    PLACE 1 TABLET UNDER THE TONGUE EVERY 5 MINUTES AS NEEDED FOR CHEST PAIN, NO MORE THAN 3 TABLETS IN ONE DAY    PANTOPRAZOLE (PROTONIX) 40 MG TABLET    Take 1 tablet (40 mg total) by mouth once daily.    PEN NEEDLE, DIABETIC (BD ULTRA-FINE SHORT PEN NEEDLE) 31 GAUGE X 5/16" NDLE    Inject 1 each into the skin 3 (three) times daily.    PREDNISONE (DELTASONE) 5 MG TABLET    Take 1 tablet (5 mg total) by mouth 2 (two) times daily.    SERTRALINE (ZOLOFT) 100 MG TABLET    Take 1 tablet (100 mg total) by mouth every evening.    SILVER SULFADIAZINE 1% (SILVADENE) 1 % CREAM    Apply topically 2 (two) times daily.    TAMSULOSIN (FLOMAX) 0.4 MG CAP    Take 1 capsule (0.4 mg total) by mouth once daily.    TIOTROPIUM (SPIRIVA WITH HANDIHALER) 18 MCG INHALATION CAPSULE    Inhale 1 capsule (18 mcg total) into the lungs once daily. Controller    TRAZODONE (DESYREL) 100 MG TABLET    Take 2 tablets (200 mg total) by mouth every evening.    VIOS AEROSOL DELIVERY SYSTEM " VIN    USE AS DIRESTED   Changed and/or Refilled Medications    Modified Medication Previous Medication    ALCOHOL SWABS (ALCOHOL PREP PADS) PADM ALCOHOL ANTISEPTIC PADS (ALCOHOL PREP PADS TOP)       Apply 1 each topically once daily.        ALLOPURINOL (ZYLOPRIM) 100 MG TABLET allopurinoL (ZYLOPRIM) 100 MG tablet       Take 1 tablet (100 mg total) by mouth once daily.    Take 1 tablet (100 mg total) by mouth once daily.    AZATHIOPRINE (IMURAN) 50 MG TAB azaTHIOprine (IMURAN) 50 mg Tab       Take 1 tablet (50 mg total) by mouth once daily.    Take 1 tablet (50 mg total) by mouth once daily.    COLCHICINE (COLCRYS) 0.6 MG TABLET colchicine (COLCRYS) 0.6 mg tablet       Take 1 tablet (0.6 mg total) by mouth once daily.    Take 1 tablet (0.6 mg total) by mouth once daily.    ELIQUIS 5 MG TAB ELIQUIS 5 mg Tab       Take 1 tablet (5 mg total) by mouth 2 (two) times daily.    Take 1 tablet (5 mg total) by mouth 2 (two) times daily.    FUROSEMIDE (LASIX) 40 MG TABLET furosemide (LASIX) 40 MG tablet       TAKE 2 TABLETS EVERY MORNING  AND TAKE 1 TABLET EVERY EVENING    TAKE 2 TABLETS EVERY MORNING  AND TAKE 1 TABLET EVERY EVENING    INSULIN ASPART U-100 (NOVOLOG FLEXPEN U-100 INSULIN) 100 UNIT/ML (3 ML) INPN PEN insulin aspart U-100 (NOVOLOG FLEXPEN U-100 INSULIN) 100 unit/mL (3 mL) InPn pen       INJECT 10 UNITS SUBCUTANEOUSLY THREE TIMES DAILY WITH MEALS    INJECT 10 UNITS SUBCUTANEOUSLY THREE TIMES DAILY WITH MEALS    ISOSORBIDE MONONITRATE (IMDUR) 60 MG 24 HR TABLET isosorbide mononitrate (IMDUR) 60 MG 24 hr tablet       Take 1 tablet (60 mg total) by mouth once daily.    Take 1 tablet (60 mg total) by mouth once daily.    LISINOPRIL 10 MG TABLET lisinopriL 10 MG tablet       Take 1 tablet (10 mg total) by mouth once daily.    Take 10 mg by mouth once daily.    OZEMPIC 1 MG/DOSE (2 MG/1.5 ML) PNIJ OZEMPIC 1 mg/dose (2 mg/1.5 mL) PnIj       Inject 0.75 mLs into the skin once a week.       Discontinued Medications     HYDROCODONE-ACETAMINOPHEN (NORCO) 7.5-325 MG PER TABLET    Take 1 tablet by mouth every 6 (six) hours as needed for Pain.       Review of patient's allergies indicates:   Allergen Reactions    Protamine Hives     Urticaria, possible upper airway swelling       Past Surgical History:   Procedure Laterality Date    ABLATION OF ARRHYTHMOGENIC FOCUS FOR ATRIAL FIBRILLATION N/A 2/27/2020    Procedure: Ablation atrial fibrillation;  Surgeon: Gio Brown MD;  Location: [facility] EP LAB;  Service: Cardiology;  Laterality: N/A;  afib, DAMIEN (cx if SR), PVI, PITO, anes, MB, 3 Prep    CHOLECYSTECTOMY      CLOSURE OF WOUND Left 7/1/2020    Procedure: CLOSURE, WOUND;  Surgeon: Barb Veras DPM;  Location: [facility] OR;  Service: Podiatry;  Laterality: Left;    SELECTIVE INJECTION OF ANESTHETIC AGENT AROUND LUMBAR SPINAL NERVE ROOT BY TRANSFORAMINAL APPROACH Left 6/11/2020    Procedure: BLOCK, SPINAL NERVE ROOT, LUMBAR, SELECTIVE, TRANSFORAMINAL APPROACH Left L4-5, L5-S1 TFESI with RN IV sedation;  Surgeon: Dillan Tsai MD;  Location: [facility] PAIN MGT;  Service: Pain Management;  Laterality: Left;    TOE AMPUTATION Left 6/29/2020    Procedure: AMPUTATION, TOE;  Surgeon: Barb Veras DPM;  Location: [facility] OR;  Service: Podiatry;  Laterality: Left;  great toe       Family History   Problem Relation Age of Onset    Glaucoma Mother     Cataracts Mother     Cataracts Father     Glaucoma Sister     Cataracts Sister     Glaucoma Maternal Aunt     No Known Problems Brother     No Known Problems Daughter     No Known Problems Son     Prostate cancer Neg Hx        Social History     Socioeconomic History    Marital status: Legally      Spouse name: Not on file    Number of children: 5    Years of education: Not on file    Highest education level: Not on file   Occupational History    Occupation: disabled   Social Needs    Financial resource strain: Not on file    Food insecurity     Worry: Not on file      "Inability: Not on file    Transportation needs     Medical: Yes     Non-medical: Not on file   Tobacco Use    Smoking status: Never Smoker    Smokeless tobacco: Never Used   Substance and Sexual Activity    Alcohol use: Not Currently    Drug use: No    Sexual activity: Yes     Partners: Female   Lifestyle    Physical activity     Days per week: Not on file     Minutes per session: Not on file    Stress: Rather much   Relationships    Social connections     Talks on phone: Not on file     Gets together: Not on file     Attends [employer] service: Not on file     Active member of club or organization: Not on file     Attends meetings of clubs or organizations: Not on file     Relationship status: Not on file   Other Topics Concern    Not on file   Social History Narrative    Not on file       Vitals:    08/10/20 0845   BP: 126/77   Pulse: 84   Weight: 86.2 kg (190 lb)   Height: 5' 5" (1.651 m)   PainSc:   9   PainLoc: Foot       ROS          Objective:      Physical examination: General: Patient is in no acute distress, alert and oriented x 3.  Dressing to left foot clean, dry, and intact.   Patient presents ambulating in surgical shoe with walker.   Lower Extremity Exam:  Physical examination: General: Patient is in no acute distress, alert and oriented x 3.  Dressing to left foot clean, dry, and intact.   Patient presents ambulating in surgical shoe with walker.    Lower Extremity Exam:  Vascular: Dorsalis pedis and Posterior tibial pulses palpable on left foot.  Capillary fill time <4 sec to toes on left foot. Minimal edema noted on left foot.   Dermatologic: Wound dehiscence measurement is [id number]. (+) exposed bone. Negative erythema, drainage, or increased temp noted to surgical site.   Neurological: Light touch sensation intact to left foot.   Musculoskeletal: Negative pain on palpation/ROM of left foot. Left hallux amputation noted.           Assessment:       Encounter Diagnoses   Name Primary? 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  Dispense: 20 tablet; Refill: 0    Dehiscence of amputation stump - Left Foot  -     Discontinue: HYDROcodone-acetaminophen (NORCO)  mg per tablet; Take 1 tablet by mouth every 8 (eight) hours as needed for Pain.  Dispense: 20 tablet; Refill: 0    Acute post-operative pain - Left Foot  -     Discontinue: HYDROcodone-acetaminophen (NORCO)  mg per tablet; Take 1 tablet by mouth every 8 (eight) hours as needed for Pain.  Dispense: 20 tablet; Refill: 0    Type II diabetes mellitus with neurological manifestations      I counseled the patient on his conditions, regarding findings of my examination, my impressions, and usual treatment plan.   With patient's permission, the left foot wound was irrigated with sterile saline and bleeding was controlled with direct pressure. Minimal blood loss.   Left foot edged painted with Gentian Violet and packed with  1/4' iodoform packing and dressed with, gauze, kerlix, and ACE.   Patient instructed to keep dressing dry, clean and intact until home health comes out.   Patient instructed to continue to ambulate in surgical shoe and walker.   Wound vac form completed and again faxed to [facility].   Prescribed Norco 10-325mg to be taken as needed for pain. Patient advised on the possible elevation of blood pressure or heart effects and caution to take pills as needed and to discontinue use if symptoms arise, patient agreed.   Patient should call the clinic immediately if any signs of infection such as fever chills sweats increased redness or pain.  Patient to  return in 1 week or sooner if needed.            Barb Veras DPM  [facility]

## 2020-08-24 ENCOUNTER — OFFICE VISIT (OUTPATIENT)
Dept: PODIATRY | Facility: CLINIC | Age: 65
End: 2020-08-24
Payer: MEDICARE

## 2020-08-24 VITALS
HEIGHT: 65 IN | DIASTOLIC BLOOD PRESSURE: 72 MMHG | SYSTOLIC BLOOD PRESSURE: 110 MMHG | WEIGHT: 190.06 LBS | HEART RATE: 83 BPM | BODY MASS INDEX: 31.67 KG/M2

## 2020-08-24 DIAGNOSIS — G89.18 ACUTE POST-OPERATIVE PAIN: ICD-10-CM

## 2020-08-24 DIAGNOSIS — Z89.412 STATUS POST AMPUTATION OF LEFT GREAT TOE: Primary | ICD-10-CM

## 2020-08-24 DIAGNOSIS — M10.9 GOUT, UNSPECIFIED CAUSE, UNSPECIFIED CHRONICITY, UNSPECIFIED SITE: ICD-10-CM

## 2020-08-24 DIAGNOSIS — E11.49 TYPE II DIABETES MELLITUS WITH NEUROLOGICAL MANIFESTATIONS: ICD-10-CM

## 2020-08-24 DIAGNOSIS — T87.81 DEHISCENCE OF AMPUTATION STUMP: ICD-10-CM

## 2020-08-24 PROCEDURE — 99024 POSTOP FOLLOW-UP VISIT: CPT | Mod: HCNC,S$GLB,, | Performed by: PODIATRIST

## 2020-08-24 PROCEDURE — 99999 PR PBB SHADOW E&M-EST. PATIENT-LVL V: ICD-10-PCS | Mod: PBBFAC,HCNC,, | Performed by: PODIATRIST

## 2020-08-24 PROCEDURE — 99999 PR PBB SHADOW E&M-EST. PATIENT-LVL V: CPT | Mod: PBBFAC,HCNC,, | Performed by: PODIATRIST

## 2020-08-24 PROCEDURE — 99024 PR POST-OP FOLLOW-UP VISIT: ICD-10-PCS | Mod: HCNC,S$GLB,, | Performed by: PODIATRIST

## 2020-08-24 RX ORDER — ALLOPURINOL 100 MG/1
100 TABLET ORAL DAILY
Qty: 30 TABLET | Refills: 2 | Status: SHIPPED | OUTPATIENT
Start: 2020-08-24 | End: 2020-12-15 | Stop reason: SDUPTHER

## 2020-08-24 RX ORDER — COLCHICINE 0.6 MG/1
0.6 TABLET ORAL DAILY
Qty: 30 TABLET | Refills: 0 | Status: SHIPPED | OUTPATIENT
Start: 2020-08-24 | End: 2020-09-22

## 2020-08-24 RX ORDER — HYDROCODONE BITARTRATE AND ACETAMINOPHEN 10; 325 MG/1; MG/1
1 TABLET ORAL EVERY 8 HOURS PRN
Qty: 20 TABLET | Refills: 0 | Status: SHIPPED | OUTPATIENT
Start: 2020-08-24 | End: 2020-09-02 | Stop reason: SDUPTHER

## 2020-08-24 RX ORDER — ISOPROPYL ALCOHOL 70 ML/100ML
1 SWAB TOPICAL DAILY
Qty: 100 EACH | Refills: 0 | Status: SHIPPED | OUTPATIENT
Start: 2020-08-24 | End: 2021-02-25 | Stop reason: SDUPTHER

## 2020-08-24 NOTE — TELEPHONE ENCOUNTER
----- Message from Pete Rendon sent at 8/24/2020  4:48 PM CDT -----  Regarding: [employer] - vicki  Would like to get an update on a refill request that was sent over 8/18/20. Please call back at [phone].         Thank you,   Pete Rendon

## 2020-08-24 NOTE — PROGRESS NOTES
Subjective:     Patient ID: Crow Green is a 65 y.o. male.    Chief Complaint: Post-op Evaluation (1 wk Post Op )    HPI: This 65 year old male returns to the clinic 7 weeks status post left hallux amputation procedure. Patient has no complaints of fever chills or sweats. Positive pain, rates pain 9/10. Patient states dressing was kept dry, clean, and intact. Patient states home health has been coming out.  Patient has no other pedal complaints today.      Patient Active Problem List   Diagnosis    ED (erectile dysfunction)    Non-proliferative diabetic retinopathy, mild, both eyes    Hypertension associated with diabetes    Diabetic polyneuropathy    Nonobstructive coronary artery disease of native artery of native heart    Chronic combined systolic and diastolic HFrEF of 45%    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, with long-term current use of insulin    SANDRITA on CPAP    Moderate asthma    Psychophysiological insomnia    Nightmares    Sleep related gastroesophageal reflux disease    Inadequate sleep hygiene    PAF (paroxysmal atrial fibrillation)    At risk for medication noncompliance    Obesity (BMI 30.0-34.9)    Indeterminate stage chronic open angle glaucoma    Right hip pain    Gait instability    Chronic right shoulder pain    Arthritis    Left sided sciatica    Dyspnea on exertion    Osteoarthritis of spine with radiculopathy, lumbar region    HNP (herniated nucleus pulposus), lumbar    Gastroesophageal reflux disease without esophagitis    Chronic diastolic heart failure    Hypogonadism in male    Disorder of parathyroid gland    Hyperlipidemia associated with type 2 diabetes mellitus    JOSE MARIA (acute kidney injury)    LRTI (lower respiratory tract infection)    Physical deconditioning    Acute pain of left shoulder    Traumatic tear of left rotator cuff    Atrial fibrillation with RVR    Other chronic  pain    Left shoulder pain    Complete tear of left rotator cuff    Stage 3 chronic kidney disease    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 associated with type 2 diabetes mellitus    Lumbar radiculopathy    Diabetic ulcer of toe of left foot associated with type 2 diabetes mellitus, with necrosis of muscle    Ulcer of left foot    Elevated troponin    Hypotension    Dermatomyositis    Postprocedural hypotension    Weakness generalized    Advanced directives, counseling/discussion    Cocaine dependence, in remission    Schizoaffective disorder, depressive type    Chronic obstructive pulmonary disease       Medication List with Changes/Refills   Current Medications    ALCOHOL SWABS (ALCOHOL PREP PADS) PADM    Apply 1 each topically once daily.    ALLOPURINOL (ZYLOPRIM) 100 MG TABLET    Take 1 tablet (100 mg total) by mouth once daily.    ASPIRIN (ECOTRIN) 81 MG EC TABLET    Take 1 tablet (81 mg total) by mouth once daily.    ATORVASTATIN (LIPITOR) 40 MG TABLET    TAKE 1 TABLET EVERY EVENING    AZATHIOPRINE (IMURAN) 50 MG TAB    Take 1 tablet (50 mg total) by mouth once daily.    BACLOFEN (LIORESAL) 10 MG TABLET    Take 1 tablet (10 mg total) by mouth once daily.    BLOOD SUGAR DIAGNOSTIC (TRUE METRIX GLUCOSE TEST STRIP) STRP    Use with blood glucose meter kit to test blood sugar four times  daily    BLOOD-GLUCOSE METER (TRUE METRIX AIR GLUCOSE METER) KIT    TEST FOUR TIMES DAILY BEFORE MEALS  AND EVERY NIGHT    COLCHICINE (COLCRYS) 0.6 MG TABLET    Take 1 tablet (0.6 mg total) by mouth once daily.    DICLOFENAC SODIUM (VOLTAREN) 1 % GEL    Apply 2 g topically 4 (four) times daily.    ELIQUIS 5 MG TAB    Take 1 tablet (5 mg total) by mouth 2 (two) times daily.    FLUTICASONE-SALMETEROL DISKUS INHALER 250-50 MCG    Inhale 1 puff into the lungs 2 (two) times daily. Controller    FOOD SUPPLEMT, LACTOSE-REDUCED (ENSURE) LIQD    Take 118 mLs by mouth 3 (three) times  daily with meals.    FUROSEMIDE (LASIX) 40 MG TABLET    TAKE 2 TABLETS EVERY MORNING  AND TAKE 1 TABLET EVERY EVENING    GABAPENTIN (NEURONTIN) 800 MG TABLET    Take 1 tablet (800 mg total) by mouth 3 (three) times daily.    GLIMEPIRIDE (AMARYL) 2 MG TABLET    TAKE 1 TABLET (2 MG TOTAL) BY MOUTH BEFORE BREAKFAST.    HYDROCODONE-ACETAMINOPHEN (NORCO)  MG PER TABLET    Take 1 tablet by mouth every 8 (eight) hours as needed for Pain.    INHALATION SPACING DEVICE    Use as directed for inhalation.    INSULIN (LANTUS SOLOSTAR U-100 INSULIN) GLARGINE 100 UNITS/ML (3ML) SUBQ PEN    INJECT 35 UNITS SUBCUTANEOUSLY EVERY EVENING    INSULIN ASPART U-100 (NOVOLOG FLEXPEN U-100 INSULIN) 100 UNIT/ML (3 ML) INPN PEN    INJECT 10 UNITS SUBCUTANEOUSLY THREE TIMES DAILY WITH MEALS    INSULIN SYRINGE-NEEDLE U-100 1 ML 31 GAUGE X 5/16 SYRG        IPRATROPIUM (ATROVENT) 0.02 % NEBULIZER SOLUTION    USE 1 VIAL VIA NEBULIZER FOUR TIMES DAILY    ISOSORBIDE MONONITRATE (IMDUR) 60 MG 24 HR TABLET    Take 1 tablet (60 mg total) by mouth once daily.    LANCETS 32 GAUGE MISC    1 lancet by Misc.(Non-Drug; Combo Route) route 2 (two) times daily.    LATANOPROST 0.005 % OPHTHALMIC SOLUTION    INSTILL 1 DROP INTO BOTH EYES EVERY EVENING    LISINOPRIL 10 MG TABLET    Take 1 tablet (10 mg total) by mouth once daily.    MAGNESIUM OXIDE (MAG-OX) 400 MG (241.3 MG MAGNESIUM) TABLET    Take 1 tablet (400 mg total) by mouth once daily.    METFORMIN (GLUCOPHAGE-XR) 500 MG XR 24HR TABLET    Take 2 tablets (1,000 mg total) by mouth 2 (two) times daily with meals.    METOPROLOL SUCCINATE (TOPROL-XL) 50 MG 24 HR TABLET    Take 1 tablet (50 mg total) by mouth once daily.    NITROGLYCERIN (NITROSTAT) 0.3 MG SL TABLET    PLACE 1 TABLET UNDER THE TONGUE EVERY 5 MINUTES AS NEEDED FOR CHEST PAIN, NO MORE THAN 3 TABLETS IN ONE DAY    OZEMPIC 1 MG/DOSE (2 MG/1.5 ML) PNIJ    Inject 0.75 mLs into the skin once a week.    PANTOPRAZOLE (PROTONIX) 40 MG TABLET    Take  "1 tablet (40 mg total) by mouth once daily.    PEN NEEDLE, DIABETIC (BD ULTRA-FINE SHORT PEN NEEDLE) 31 GAUGE X 5/16" NDLE    Inject 1 each into the skin 3 (three) times daily.    PREDNISONE (DELTASONE) 5 MG TABLET    Take 1 tablet (5 mg total) by mouth 2 (two) times daily.    SERTRALINE (ZOLOFT) 100 MG TABLET    Take 1 tablet (100 mg total) by mouth every evening.    SILVER SULFADIAZINE 1% (SILVADENE) 1 % CREAM    Apply topically 2 (two) times daily.    TAMSULOSIN (FLOMAX) 0.4 MG CAP    Take 1 capsule (0.4 mg total) by mouth once daily.    TIOTROPIUM (SPIRIVA WITH HANDIHALER) 18 MCG INHALATION CAPSULE    Inhale 1 capsule (18 mcg total) into the lungs once daily. Controller    TRAZODONE (DESYREL) 100 MG TABLET    Take 2 tablets (200 mg total) by mouth every evening.    VIOS AEROSOL DELIVERY SYSTEM VIN    USE AS DIRESTED       Review of patient's allergies indicates:   Allergen Reactions    Protamine Hives     Urticaria, possible upper airway swelling       Past Surgical History:   Procedure Laterality Date    ABLATION OF ARRHYTHMOGENIC FOCUS FOR ATRIAL FIBRILLATION N/A 2/27/2020    Procedure: Ablation atrial fibrillation;  Surgeon: Gio Brown MD;  Location: [facility] EP LAB;  Service: Cardiology;  Laterality: N/A;  afib, DAMIEN (cx if SR), PVI, PITO, anes, MB, 3 Prep    CHOLECYSTECTOMY      CLOSURE OF WOUND Left 7/1/2020    Procedure: CLOSURE, WOUND;  Surgeon: Barb Veras DPM;  Location: [facility] OR;  Service: Podiatry;  Laterality: Left;    SELECTIVE INJECTION OF ANESTHETIC AGENT AROUND LUMBAR SPINAL NERVE ROOT BY TRANSFORAMINAL APPROACH Left 6/11/2020    Procedure: BLOCK, SPINAL NERVE ROOT, LUMBAR, SELECTIVE, TRANSFORAMINAL APPROACH Left L4-5, L5-S1 TFESI with RN IV sedation;  Surgeon: Dillan Tsai MD;  Location: [facility] PAIN MGT;  Service: Pain Management;  Laterality: Left;    TOE AMPUTATION Left 6/29/2020    Procedure: AMPUTATION, TOE;  Surgeon: Barb Veras DPM;  Location: [facility] OR;  Service: Podiatry; " " Laterality: Left;  great toe       Family History   Problem Relation Age of Onset    Glaucoma Mother     Cataracts Mother     Cataracts Father     Glaucoma Sister     Cataracts Sister     Glaucoma Maternal Aunt     No Known Problems Brother     No Known Problems Daughter     No Known Problems Son     Prostate cancer Neg Hx        Social History     Socioeconomic History    Marital status: Legally      Spouse name: Not on file    Number of children: 5    Years of education: Not on file    Highest education level: Not on file   Occupational History    Occupation: disabled   Social Needs    Financial resource strain: Not on file    Food insecurity     Worry: Not on file     Inability: Not on file    Transportation needs     Medical: Yes     Non-medical: Not on file   Tobacco Use    Smoking status: Never Smoker    Smokeless tobacco: Never Used   Substance and Sexual Activity    Alcohol use: Not Currently    Drug use: No    Sexual activity: Yes     Partners: Female   Lifestyle    Physical activity     Days per week: Not on file     Minutes per session: Not on file    Stress: Rather much   Relationships    Social connections     Talks on phone: Not on file     Gets together: Not on file     Attends [employer] service: Not on file     Active member of club or organization: Not on file     Attends meetings of clubs or organizations: Not on file     Relationship status: Not on file   Other Topics Concern    Not on file   Social History Narrative    Not on file       Vitals:    08/24/20 0828   BP: 110/72   Pulse: 83   Weight: 86.2 kg (190 lb 0.6 oz)   Height: 5' 5" (1.651 m)       Review of Systems   Constitutional: Negative for chills and fever.   Respiratory: Negative for shortness of breath.    Cardiovascular: Negative for chest pain, palpitations, orthopnea, claudication and leg swelling.   Gastrointestinal: Negative for diarrhea, nausea and vomiting.   Musculoskeletal: Negative for " joint pain.   Skin: Negative for rash.   Neurological: Positive for tingling and sensory change.   Psychiatric/Behavioral: Negative.              Objective:      Physical examination: General: Patient is in no acute distress, alert and oriented x 3.  Dressing to left foot clean, dry, and intact.   Patient presents ambulating in surgical shoe with walker.    Lower Extremity Exam:  Vascular: Dorsalis pedis and Posterior tibial pulses palpable on left foot.  Capillary fill time <4 sec to toes on left foot. Minimal edema noted on left foot.   Dermatologic: Wound dehiscence measurement is 2cm x 1.5cm x 0.5cm. (+) decreased exposed bone. Negative erythema, drainage, or increased temp noted to surgical site.   Neurological: Light touch sensation intact to left foot.   Musculoskeletal: Negative pain on palpation/ROM of left foot. Left hallux amputation noted.               Assessment:       Encounter Diagnoses   Name Primary?   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   With patient's permission, the left foot wound was irrigated with sterile saline and bleeding was controlled with direct pressure. Minimal blood loss.   Left foot edged painted with Gentian Violet and packed with  1/4' iodoform packing and dressed with, gauze, kerlix, and ACE.   Patient instructed to keep dressing dry, clean and intact until home health comes out.   Patient instructed to continue to ambulate in surgical shoe and walker.   Wound vac  form completed and again faxed to [facility]. Will reach out to [facility] again for status of wound vac.   Refill completed for Norco 10-325mg to be taken as needed for pain. Patient advised on the possible elevation of blood pressure or heart effects and caution to take pills as needed and to discontinue use if symptoms arise, patient agreed.   Patient should call the clinic immediately if any signs of infection such as fever chills sweats increased redness or pain.  Patient to return in 1 week or sooner if needed.          Barb Veras DPM  [facility]

## 2020-08-25 ENCOUNTER — OUTPATIENT CASE MANAGEMENT (OUTPATIENT)
Dept: ADMINISTRATIVE | Facility: OTHER | Age: 65
End: 2020-08-25

## 2020-08-25 NOTE — PROGRESS NOTES
Outpatient Care Management   - Care Plan Follow Up    Patient: Crow Green  MRN:  [id number]  Date of Service:  8/25/2020  Completed by:  Amanda Whipple LCSW  Referral Date: 06/29/2020  Program: Case Management (High Risk)    Reason for Visit   Patient presents with    Update Plan Of Care     8/25/20       Brief Summary: Phoned patient. Patient stated that he continues to have difficulty with ambulation and has an appointment with PCP to assess for WC. Discussed [facility] and need to call for request. Arranged to meet with patient at the [facility] between his appointments at 10am and 12:30p.     Complex Care Plan     Care plan was discussed and completed today with input from patient and/or caregiver.    Patient Instructions     Instructions were provided via the [employer] patient resources and are available for the patient to view on the patient portal.    Follow up in about 2 weeks (around 9/8/2020) for Assist with [facility], Discuss start date for IOP. .    Todays OPCM Self-Management Care Plan was developed with the patients/caregivers input and was based on identified barriers from todays assessment.  Goals were written today with the patient/caregiver and the patient has agreed to work towards these goals to improve his/her overall well-being. Patient verbalized understanding of the care plan, goals, and all of today's instructions. Encouraged patient/caregiver to communicate with his/her physician and health care team about health conditions and the treatment plan.  Provided my contact information today and encouraged patient/caregiver to call me with any questions as needed.

## 2020-08-26 DIAGNOSIS — S46.012D TRAUMATIC INCOMPLETE TEAR OF LEFT ROTATOR CUFF, SUBSEQUENT ENCOUNTER: Primary | ICD-10-CM
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
## 2020-08-31 ENCOUNTER — OFFICE VISIT (OUTPATIENT)
Dept: PODIATRY | Facility: CLINIC | Age: 65
End: 2020-08-31
Payer: MEDICARE

## 2020-08-31 VITALS
HEIGHT: 65 IN | HEART RATE: 87 BPM | BODY MASS INDEX: 31.67 KG/M2 | SYSTOLIC BLOOD PRESSURE: 120 MMHG | WEIGHT: 190.06 LBS | DIASTOLIC BLOOD PRESSURE: 76 MMHG

## 2020-08-31 DIAGNOSIS — T87.81 DEHISCENCE OF AMPUTATION STUMP: ICD-10-CM

## 2020-08-31 DIAGNOSIS — Z89.412 STATUS POST AMPUTATION OF LEFT GREAT TOE: Primary | ICD-10-CM

## 2020-08-31 DIAGNOSIS — G89.18 ACUTE POST-OPERATIVE PAIN: ICD-10-CM

## 2020-08-31 PROCEDURE — 99024 POSTOP FOLLOW-UP VISIT: CPT | Mod: HCNC,S$GLB,, | Performed by: PODIATRIST

## 2020-08-31 PROCEDURE — 99999 PR PBB SHADOW E&M-EST. PATIENT-LVL V: CPT | Mod: PBBFAC,HCNC,, | Performed by: PODIATRIST

## 2020-08-31 PROCEDURE — 99999 PR PBB SHADOW E&M-EST. PATIENT-LVL V: ICD-10-PCS | Mod: PBBFAC,HCNC,, | Performed by: PODIATRIST

## 2020-08-31 PROCEDURE — 99024 PR POST-OP FOLLOW-UP VISIT: ICD-10-PCS | Mod: HCNC,S$GLB,, | Performed by: PODIATRIST

## 2020-09-01 ENCOUNTER — CARE AT HOME (OUTPATIENT)
Dept: HOME HEALTH SERVICES | Facility: CLINIC | Age: 65
End: 2020-09-01
Payer: MEDICARE

## 2020-09-01 ENCOUNTER — DOCUMENT SCAN (OUTPATIENT)
Dept: HOME HEALTH SERVICES | Facility: HOSPITAL | Age: 65
End: 2020-09-01
Payer: MEDICARE

## 2020-09-01 VITALS
RESPIRATION RATE: 18 BRPM | HEART RATE: 95 BPM | OXYGEN SATURATION: 98 % | SYSTOLIC BLOOD PRESSURE: 94 MMHG | TEMPERATURE: 98 F | DIASTOLIC BLOOD PRESSURE: 69 MMHG

## 2020-09-01 DIAGNOSIS — I95.2 HYPOTENSION DUE TO MEDICATION: ICD-10-CM

## 2020-09-01 DIAGNOSIS — Z09 FOLLOW UP: Primary | ICD-10-CM

## 2020-09-01 PROCEDURE — 3074F SYST BP LT 130 MM HG: CPT | Mod: CPTII,S$GLB,, | Performed by: NURSE PRACTITIONER

## 2020-09-01 PROCEDURE — 3078F DIAST BP <80 MM HG: CPT | Mod: CPTII,S$GLB,, | Performed by: NURSE PRACTITIONER

## 2020-09-01 PROCEDURE — 99349 HOME/RES VST EST MOD MDM 40: CPT | Mod: S$GLB,,, | Performed by: NURSE PRACTITIONER

## 2020-09-01 PROCEDURE — 3074F PR MOST RECENT SYSTOLIC BLOOD PRESSURE < 130 MM HG: ICD-10-PCS | Mod: CPTII,S$GLB,, | Performed by: NURSE PRACTITIONER

## 2020-09-01 PROCEDURE — 3078F PR MOST RECENT DIASTOLIC BLOOD PRESSURE < 80 MM HG: ICD-10-PCS | Mod: CPTII,S$GLB,, | Performed by: NURSE PRACTITIONER

## 2020-09-01 PROCEDURE — 99349 PR HOME VISIT,ESTAB PATIENT,LEVEL III: ICD-10-PCS | Mod: S$GLB,,, | Performed by: NURSE PRACTITIONER

## 2020-09-01 NOTE — PROGRESS NOTES
"[facility]  Medical Home Visit    Visit Date: 07/29/20  Encounter Provider: Sumit Gonsalves NP  PCP:  Mateo Lambert DO    Subjective:      Patient ID: Crow Green is a 65 y.o. male.    Consult Requested By:  Sumit Gonsalves  Reason for Consult: Medical Visit by Home Care Provider    The patient is being seen at home due to a physical debility that presents a taxing effort to leave the home, to mitigate high risk of hospital readmission or due to the limited availability of reliable or safe options for transportation to the point of access to the provider. The visit meets the criteria for medical necessity as defined by CMS as "health-care services needed to prevent, diagnose, or treat an illness, injury, condition, disease, or its symptoms and that meet accepted standards of medicine." Prior to treatment on this visit the chart was reviewed and patient consent was obtained.    Chief Complaint: Follow-up for chronic medical conditions and medication review, hypotensive episode    Today:  Mr. Crow Green is a 65 y.o. male Crow presents at lower that his baseline state of health as reported by patient and caregiver his father, with whom he lives. VS remains low. My previous directive to hold the lisinopril was not maintained by the patient. Today I discontniued the Rx and he reports understanding. Home Health Nurse Nhung called to report my change to his medications and she will implement his pill box accordingly.     He denies and signs or symptoms of lethargy or weakness attributable to hypotension. He does report managed pain to his left foot surgical amputttian site, which is wrapped with ace bandage and is cared for MON-WED-Fri per the surguical team's wound care instructions and implemented  by DONNA SPARKS. Denies any acute issues, concerns or complaints to address on today's visit.  Reports taking all medications as prescribed. No other needs identified at this time. " Risks of environmental exposure to coronavirus discussed including: social distancing, hand hygiene, awnd limiting departures from the home for necessities only.  Reports understanding and willingness to comply.     Review of Systems   Constitutional: Positive for appetite change. Negative for chills, fever and unexpected weight change.   HENT: Negative.    Eyes: Negative.    Respiratory: Positive for cough (improved). Negative for apnea and shortness of breath.    Cardiovascular: Positive for leg swelling. Negative for chest pain and palpitations.   Gastrointestinal: Positive for abdominal distention.   Endocrine: Negative.    Musculoskeletal: Positive for arthralgias, gait problem and joint swelling.   Skin: Positive for color change and wound (left foot amputation).   Hematological: Negative.    Psychiatric/Behavioral: Positive for agitation. Confusion: r/t plan of care and meds.   All other systems reviewed and are negative.    Assessments:  · Environmental: single story home, no steps to enter, adequate lighting and temeprature control  · Functional Status: Independent with ADL's/IADL's, ambulates with assistance of a cane/walker, continent of bowel and bladder  · Safety: Fall Precautions, COVID Precautions/Social Distancing/Mask Use  · Nutritional: Adequate  · Home Health: [facility]   · DME/Supplies: walker/wpud care supplies.gucometer,blood pressure cuff     Objective:     Vitals:    09/01/20 1352   BP: 94/69   Pulse: 95   Resp: 18   Temp: 98 °F (36.7 °C)   TempSrc: Temporal   SpO2: 98%   PainSc:   7   PainLoc: Foot     There is no height or weight on file to calculate BMI.    Physical Exam  Constitutional:       General: He is awake.      Appearance: Normal appearance. He is obese.   HENT:      Head: Normocephalic.      Nose: Nose normal.   Eyes:      General: Lids are normal.   Neck:      Musculoskeletal: Normal range of motion and neck supple.   Cardiovascular:      Rate and Rhythm: Normal rate and regular  rhythm.      Pulses:           Dorsalis pedis pulses are 2+ on the right side and 1+ on the left side.      Heart sounds: Normal heart sounds. No murmur.   Pulmonary:      Effort: Pulmonary effort is normal. No accessory muscle usage, prolonged expiration or respiratory distress.      Breath sounds: Normal breath sounds.   Abdominal:      General: Abdomen is flat. Bowel sounds are normal. There is no distension.      Palpations: Abdomen is soft.      Tenderness: There is no abdominal tenderness.   Musculoskeletal:         General: Tenderness and signs of injury (surgery to left foot) present.      Left ankle: He exhibits decreased range of motion, swelling, deformity and abnormal pulse. Tenderness.      Right lower leg: Edema present.      Left lower leg: Edema present.      Left foot: Decreased range of motion.   Feet:      Right foot:      Skin integrity: Skin integrity normal.      Toenail Condition: Right toenails are abnormally thick.      Left foot:      Amputation: (great toe)     Skin integrity: Skin integrity normal.      Comments: Amputee of great toe, foot wrapped with ace  Skin:     General: Skin is warm and dry.      Capillary Refill: Capillary refill takes less than 2 seconds.      Findings: Bruising present.   Neurological:      General: No focal deficit present.      Mental Status: He is alert and oriented to person, place, and time. Mental status is at baseline.      GCS: GCS eye subscore is 4. GCS verbal subscore is 5. GCS motor subscore is 6.      Motor: Motor function is intact. No weakness.      Gait: Gait abnormal (2/2 surgery).   Psychiatric:         Mood and Affect: Mood normal.         Behavior: Behavior normal. Behavior is cooperative.       Assessment:     1. Follow up    2. Hypotension due to medication      Plan:     Ethical / Legal: Advance Care Planning   Capacity to make medical decisions:  yes, Conflict no  · Surrogate decision maker:  Name Erica Green Relationship:  Aunt  · Advance Directives:  none  · HCPOA: none  · LaPOST:  Signed this visit  · Code Status:  Full    Advanced Care Directives, and HCPOA  forms left in the home for family review, discussion and signing with instructions to return upon their next provider encounter for inclusion to the medical record. LaPost signed by patient this visit full code status     Encounter for Medical Follow-Up and Medication Review   - [facility] Nurse Practitioner to schedule home visit with patient in one month or PRN.    Hypotension after procedure  Discontinue lisinopril  Monitor VSS daily until my next visit to make any further adjustments.  - HH SN is made aware of the plan of care    Were controlled substances prescribed?  No    Follow Up Appointments:   Future Appointments   Date Time Provider Department Center   9/3/2020  1:20 PM Mateo Lambert,  [facility] [place]   9/8/2020  8:00 AM SARAH CuelloM [facility] POD [place]   9/8/2020 10:00 AM Eri Armstrong NP [facility] PUL[facility] [place]   9/8/2020 12:30 PM Lexis Bueno, PT [facility] RHB[facility] [place]   9/14/2020  9:40 AM Barb Veras DPM [facility] POD [place]   10/1/2020  9:30 AM Eduardo Castillo PA-C [facility] [place]   10/5/2020  1:00 PM Sumit Gonsalves, AILEEN [facility] [place]   10/29/2020 10:40 AM Eduard Zhao MD [facility] CARDIO [place]   11/20/2020 11:00 AM Cisco Luciano MD [facility] ORTHO [place]       Attestation: Screening criteria to assess the level of the patient's risk for infection with COVID-19 as recommended by the CDC at the time of the above documented home visit concluded appropriateness to proceed. Universal precautions were maintained at all times, including provider use of >60% alcohol gel hand  immediately prior to entry and upon departing the patient's home as well as cleaning of equipment used in home visit with antibacterial/germicidal disposable wipes.     Signature:    Sumit Gonsalves, MSN, APRN,  FNP-C  [facility]    Total face-to-face time was 40 min, >50% of this was spent on counseling and coordination of care. The following issues were discussed: primary and secondary diagnoses, co-morbidities, prescribed medications, treatment modalities, importance of compliance with medical advice and directives for follow-up care.

## 2020-09-01 NOTE — PATIENT INSTRUCTIONS
- [facility] to schedule follow-up visit with patient in 4-6 weeks or as needed.  - Continue all medications, treatments and therapies as ordered.   - Follow all instructions, recommendations as discussed.  - Maintain Safety Precautions at all times.  - Attend all medical appointments as scheduled.  - For worsening symptoms: call Primary Care Physician or Nurse Practitioner.  - For emergencies, call 911 or immediately report to the nearest emergency room.  - Limit Risks of environmental exposure to coronavirus as discussed including: social distancing, hand hygiene, and limiting departures from the home for necessities only.     Your care is important to us. If your provider recommended a follow-up appointment or test, we are happy to help you coordinate your recommended care. It is important that you complete your recommended follow-up. If you need help scheduling, please call [phone]. Appointments can also be made online through the patient portal.  While scheduling and attending your appointments is your responsibility, our goal is to support and empower you throughout that process.    [facility] Nurse Care Line - 24/7 Assistance  Unless otherwise directed by your provider, please contact [facility], our nurse care line that is available for 24/7 assistance.  Registered nurses in the [facility] provide: appointment scheduling, clinical advisement, health education, and other advisory services.  Call: [phone] (toll free)    COVID-19 Prevention   Avoid close contact with people and stay home if youre sick, except to get medical care.   Cover coughs and sneezes with a tissue, or use the inside of your elbow. Immediately wash your hands or use hand .  For more information, see CDC link below:  https://www.cdc.gov/coronavirus/2019-ncov/hcp/guidance-prevent-spread.html#precautions     The following information is provided to all patients.  This information is  to help you find resources for any of the problems found today that may be affecting your health:            Living healthy guide: www.[facility].[place].[facility]    Understanding Diabetes: www.diabetes.org   Eating healthy: www.cdc.gov/healthyweight   CDC home safety checklist: www.cdc.gov/steadi/patient.html  Agency on Aging: www.goea.[place].[facility]    Alcoholics anonymous (AA): www.aa.org   Physical Activity: www.fabrice.nih.gov/it0ksuf    Tobacco use: www.quitwithusla.org

## 2020-09-02 ENCOUNTER — TELEPHONE (OUTPATIENT)
Dept: PHARMACY | Facility: CLINIC | Age: 65
End: 2020-09-02

## 2020-09-02 RX ORDER — HYDROCODONE BITARTRATE AND ACETAMINOPHEN 10; 325 MG/1; MG/1
1 TABLET ORAL EVERY 8 HOURS PRN
Qty: 20 TABLET | Refills: 0 | Status: SHIPPED | OUTPATIENT
Start: 2020-09-02 | End: 2020-09-11 | Stop reason: SDUPTHER

## 2020-09-08 ENCOUNTER — TELEPHONE (OUTPATIENT)
Dept: PULMONOLOGY | Facility: CLINIC | Age: 65
End: 2020-09-08

## 2020-09-08 ENCOUNTER — TELEPHONE (OUTPATIENT)
Dept: INTERNAL MEDICINE | Facility: CLINIC | Age: 65
End: 2020-09-08

## 2020-09-08 ENCOUNTER — OUTPATIENT CASE MANAGEMENT (OUTPATIENT)
Dept: ADMINISTRATIVE | Facility: OTHER | Age: 65
End: 2020-09-08

## 2020-09-08 ENCOUNTER — DOCUMENT SCAN (OUTPATIENT)
Dept: HOME HEALTH SERVICES | Facility: HOSPITAL | Age: 65
End: 2020-09-08
Payer: MEDICARE

## 2020-09-08 PROCEDURE — 99499 UNLISTED E&M SERVICE: CPT | Mod: ,,, | Performed by: PODIATRIST

## 2020-09-08 PROCEDURE — G0179 MD RECERTIFICATION HHA PT: HCPCS | Mod: ,,, | Performed by: FAMILY MEDICINE

## 2020-09-08 PROCEDURE — 99499 NO LOS: ICD-10-PCS | Mod: ,,, | Performed by: PODIATRIST

## 2020-09-08 PROCEDURE — G0179 PR HOME HEALTH MD RECERTIFICATION: ICD-10-PCS | Mod: ,,, | Performed by: FAMILY MEDICINE

## 2020-09-08 NOTE — PROGRESS NOTES
9/8/20, Left message for patient, cancelling today's visit, Requested call back.     Outpatient Care Management   - Care Plan Follow Up    Patient: Crow Green  MRN:  [id number]  Date of Service:  9/8/2020  Completed by:  Amanda Whipple LCSW  Referral Date: 06/29/2020  Program: Case Management (High Risk)    Reason for Visit   Patient presents with    OPCM SW First Follow-up Attempt     9/8/20    Update Plan Of Care     9/8/20       Brief Summary: Received call back from patient. Patient expressed frustration that he transportation did not pick him up for today's appointments. He verified that he had called 3 days in advance. His 8 am appointment was cancelled by the clinic and he had notified [employer] transportation of the cancellation of that appointment He was informed that they could not find a provider in 1 1/2 days. He expressed frustration at the situation, stated that he just wants to go to bed and he has lost his appetite. Discussed coping skills and need to initiate IOP. Patient agreed.   Patient agreed to [facility] sending message to providers explaining the transportation issue and to follow up on 9/11/20.     Complex Care Plan     Care plan was discussed and completed today with input from patient and/or caregiver.    Patient Instructions     Instructions were provided via the [employer] patient resources and are available for the patient to view on the patient portal.    Follow up in about 3 days (around 9/11/2020) for [employer] transportation called for 9/14/20, schedule to meet patient at clinic. update..    Todays OPCM Self-Management Care Plan was developed with the patients/caregivers input and was based on identified barriers from todays assessment.  Goals were written today with the patient/caregiver and the patient has agreed to work towards these goals to improve his/her overall well-being. Patient verbalized understanding of the care plan, goals, and all of today's  instructions. Encouraged patient/caregiver to communicate with his/her physician and health care team about health conditions and the treatment plan.  Provided my contact information today and encouraged patient/caregiver to call me with any questions as needed.

## 2020-09-08 NOTE — TELEPHONE ENCOUNTER
----- Message from Amanda Whipple LCSW sent at 9/8/2020 10:28 AM CDT -----  Regarding: Missed appointment  This patient was scheduled to come to appointments utilizing [employer] transportation. They did not pick him up. He asked that I let you know why he missed today's appointments.   We are working to resolve his transportation issues.       Amanda Whipple LCSW  [facility] Social Worker  [phone]

## 2020-09-08 NOTE — TELEPHONE ENCOUNTER
----- Message from Fouzia Timmons sent at 9/8/2020 10:42 AM CDT -----  Contact: self/[phone]  Would like to consult with nurse regarding his diabetes(Dexcom), please call back at [phone]. Thanks/ar

## 2020-09-08 NOTE — TELEPHONE ENCOUNTER
----- Message from Nathen Mujica sent at 9/8/2020 11:02 AM CDT -----  Contact: Pt  Type:  Sooner Apoointment Request    Caller is requesting a sooner appointment.  Caller declined first available appointment listed below.  Caller will not accept being placed on the waitlist and is requesting a message be sent to doctor.  Name of Caller: pt   When is the first available appointment?10/08/20  Symptoms: follow up   Would the patient rather a call back or a response via [facility]? Phone   Best Call Back Number: [phone]  Additional Information:

## 2020-09-09 ENCOUNTER — TELEPHONE (OUTPATIENT)
Dept: PODIATRY | Facility: CLINIC | Age: 65
End: 2020-09-09

## 2020-09-09 RX ORDER — APIXABAN 5 MG/1
TABLET, FILM COATED ORAL
Qty: 60 TABLET | Refills: 11 | Status: SHIPPED | OUTPATIENT
Start: 2020-09-09 | End: 2021-03-23 | Stop reason: SDUPTHER

## 2020-09-11 ENCOUNTER — OFFICE VISIT (OUTPATIENT)
Dept: PODIATRY | Facility: CLINIC | Age: 65
End: 2020-09-11
Payer: MEDICARE

## 2020-09-11 ENCOUNTER — OUTPATIENT CASE MANAGEMENT (OUTPATIENT)
Dept: ADMINISTRATIVE | Facility: OTHER | Age: 65
End: 2020-09-11

## 2020-09-11 ENCOUNTER — LAB VISIT (OUTPATIENT)
Dept: LAB | Facility: HOSPITAL | Age: 65
End: 2020-09-11
Attending: PODIATRIST
Payer: MEDICARE

## 2020-09-11 VITALS
HEIGHT: 65 IN | BODY MASS INDEX: 31.67 KG/M2 | WEIGHT: 190.06 LBS | SYSTOLIC BLOOD PRESSURE: 130 MMHG | HEART RATE: 78 BPM | DIASTOLIC BLOOD PRESSURE: 76 MMHG

## 2020-09-11 DIAGNOSIS — Z89.412 STATUS POST AMPUTATION OF LEFT GREAT TOE: ICD-10-CM

## 2020-09-11 DIAGNOSIS — M71.072 ABSCESS OF BURSA OF LEFT FOOT: ICD-10-CM

## 2020-09-11 DIAGNOSIS — T87.81 DEHISCENCE OF AMPUTATION STUMP: ICD-10-CM

## 2020-09-11 DIAGNOSIS — M71.072 ABSCESS OF BURSA OF LEFT FOOT: Primary | ICD-10-CM

## 2020-09-11 DIAGNOSIS — G89.18 ACUTE POST-OPERATIVE PAIN: ICD-10-CM

## 2020-09-11 DIAGNOSIS — B95.62: ICD-10-CM

## 2020-09-11 DIAGNOSIS — L03.116: ICD-10-CM

## 2020-09-11 DIAGNOSIS — M47.26 OSTEOARTHRITIS OF SPINE WITH RADICULOPATHY, LUMBAR REGION: ICD-10-CM

## 2020-09-11 DIAGNOSIS — L03.116 CELLULITIS OF LEFT FOOT: ICD-10-CM

## 2020-09-11 DIAGNOSIS — M51.26 HNP (HERNIATED NUCLEUS PULPOSUS), LUMBAR: ICD-10-CM

## 2020-09-11 LAB
BASOPHILS # BLD AUTO: 0.02 K/UL (ref 0–0.2)
BASOPHILS NFR BLD: 0.1 % (ref 0–1.9)
DIFFERENTIAL METHOD: ABNORMAL
EOSINOPHIL # BLD AUTO: 0.2 K/UL (ref 0–0.5)
EOSINOPHIL NFR BLD: 1.4 % (ref 0–8)
ERYTHROCYTE [DISTWIDTH] IN BLOOD BY AUTOMATED COUNT: 15.2 % (ref 11.5–14.5)
ERYTHROCYTE [SEDIMENTATION RATE] IN BLOOD BY WESTERGREN METHOD: 92 MM/HR (ref 0–23)
HCT VFR BLD AUTO: 37.1 % (ref 40–54)
HGB BLD-MCNC: 11 G/DL (ref 14–18)
IMM GRANULOCYTES # BLD AUTO: 0.11 K/UL (ref 0–0.04)
IMM GRANULOCYTES NFR BLD AUTO: 0.7 % (ref 0–0.5)
LYMPHOCYTES # BLD AUTO: 2.5 K/UL (ref 1–4.8)
LYMPHOCYTES NFR BLD: 16.7 % (ref 18–48)
MCH RBC QN AUTO: 25.6 PG (ref 27–31)
MCHC RBC AUTO-ENTMCNC: 29.6 G/DL (ref 32–36)
MCV RBC AUTO: 87 FL (ref 82–98)
MONOCYTES # BLD AUTO: 1.2 K/UL (ref 0.3–1)
MONOCYTES NFR BLD: 7.8 % (ref 4–15)
NEUTROPHILS # BLD AUTO: 10.9 K/UL (ref 1.8–7.7)
NEUTROPHILS NFR BLD: 73.3 % (ref 38–73)
NRBC BLD-RTO: 0 /100 WBC
PLATELET # BLD AUTO: 238 K/UL (ref 150–350)
PMV BLD AUTO: 11.7 FL (ref 9.2–12.9)
RBC # BLD AUTO: 4.29 M/UL (ref 4.6–6.2)
WBC # BLD AUTO: 14.81 K/UL (ref 3.9–12.7)

## 2020-09-11 PROCEDURE — 84145 PROCALCITONIN (PCT): CPT | Mod: HCNC

## 2020-09-11 PROCEDURE — 99024 PR POST-OP FOLLOW-UP VISIT: ICD-10-PCS | Mod: HCNC,S$GLB,, | Performed by: PODIATRIST

## 2020-09-11 PROCEDURE — 99999 PR PBB SHADOW E&M-EST. PATIENT-LVL V: CPT | Mod: PBBFAC,HCNC,, | Performed by: PODIATRIST

## 2020-09-11 PROCEDURE — 85025 COMPLETE CBC W/AUTO DIFF WBC: CPT | Mod: HCNC

## 2020-09-11 PROCEDURE — 99999 PR PBB SHADOW E&M-EST. PATIENT-LVL V: ICD-10-PCS | Mod: PBBFAC,HCNC,, | Performed by: PODIATRIST

## 2020-09-11 PROCEDURE — 99024 POSTOP FOLLOW-UP VISIT: CPT | Mod: HCNC,S$GLB,, | Performed by: PODIATRIST

## 2020-09-11 PROCEDURE — 36415 COLL VENOUS BLD VENIPUNCTURE: CPT | Mod: HCNC

## 2020-09-11 PROCEDURE — 85652 RBC SED RATE AUTOMATED: CPT | Mod: HCNC

## 2020-09-11 PROCEDURE — 86140 C-REACTIVE PROTEIN: CPT | Mod: HCNC

## 2020-09-11 RX ORDER — HYDROCODONE BITARTRATE AND ACETAMINOPHEN 10; 325 MG/1; MG/1
1 TABLET ORAL EVERY 8 HOURS PRN
Qty: 20 TABLET | Refills: 0 | Status: SHIPPED | OUTPATIENT
Start: 2020-09-11 | End: 2020-09-16 | Stop reason: SDUPTHER

## 2020-09-11 RX ORDER — GABAPENTIN 800 MG/1
800 TABLET ORAL 3 TIMES DAILY
Qty: 270 TABLET | Refills: 4 | Status: SHIPPED | OUTPATIENT
Start: 2020-09-11 | End: 2021-02-08 | Stop reason: SDUPTHER

## 2020-09-11 RX ORDER — SULFAMETHOXAZOLE AND TRIMETHOPRIM 800; 160 MG/1; MG/1
1 TABLET ORAL 2 TIMES DAILY
Qty: 20 TABLET | Refills: 0 | Status: SHIPPED | OUTPATIENT
Start: 2020-09-11 | End: 2020-09-21

## 2020-09-11 RX ORDER — HYDROCODONE BITARTRATE AND ACETAMINOPHEN 10; 325 MG/1; MG/1
1 TABLET ORAL EVERY 8 HOURS PRN
Qty: 20 TABLET | Refills: 0 | Status: SHIPPED | OUTPATIENT
Start: 2020-09-11 | End: 2020-09-11 | Stop reason: SDUPTHER

## 2020-09-11 RX ORDER — DOXYCYCLINE 100 MG/1
100 CAPSULE ORAL 2 TIMES DAILY
Qty: 20 CAPSULE | Refills: 0 | Status: SHIPPED | OUTPATIENT
Start: 2020-09-11 | End: 2020-10-21 | Stop reason: SDUPTHER

## 2020-09-12 LAB
CRP SERPL-MCNC: 97.4 MG/L (ref 0–8.2)
PROCALCITONIN SERPL IA-MCNC: 0.09 NG/ML

## 2020-09-13 NOTE — PROGRESS NOTES
Subjective:     Patient ID: Crow Green is a 65 y.o. male.    Chief Complaint: Post-op Evaluation (2 wk Post Op S/P Amputation of Left Great Toe)    HPI: This 65 year old male returns to the clinic 2 months status post left hallux amputation procedure. Patient has no complaints of fever chills or sweats. Positive pain, rates pain 7/10. Patient states dressing was kept dry, clean, and intact. Patient states home health has been coming out. Patient states the wound vac was placed on Friday. Patient has no other pedal complaints today.        Patient Active Problem List   Diagnosis    ED (erectile dysfunction)    Non-proliferative diabetic retinopathy, mild, both eyes    Hypertension associated with diabetes    Diabetic polyneuropathy    Nonobstructive coronary artery disease of native artery of native heart    Chronic combined systolic and diastolic HFrEF of 45%    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, with long-term current use of insulin    SANDRITA on CPAP    Moderate asthma    Psychophysiological insomnia    Nightmares    Sleep related gastroesophageal reflux disease    Inadequate sleep hygiene    PAF (paroxysmal atrial fibrillation)    At risk for medication noncompliance    Obesity (BMI 30.0-34.9)    Indeterminate stage chronic open angle glaucoma    Right hip pain    Gait instability    Chronic right shoulder pain    Arthritis    Left sided sciatica    Dyspnea on exertion    Osteoarthritis of spine with radiculopathy, lumbar region    HNP (herniated nucleus pulposus), lumbar    Gastroesophageal reflux disease without esophagitis    Chronic diastolic heart failure    Hypogonadism in male    Disorder of parathyroid gland    Hyperlipidemia associated with type 2 diabetes mellitus    JOSE MARIA (acute kidney injury)    LRTI (lower respiratory tract infection)    Physical deconditioning    Acute pain of left shoulder     Traumatic tear of left rotator cuff    Atrial fibrillation with RVR    Other chronic pain    Left shoulder pain    Complete tear of left rotator cuff    Stage 3 chronic kidney disease    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 associated with type 2 diabetes mellitus    Lumbar radiculopathy    Diabetic ulcer of toe of left foot associated with type 2 diabetes mellitus, with necrosis of muscle    Ulcer of left foot    Elevated troponin    Hypotension due to medication    Dermatomyositis    Postprocedural hypotension    Follow up    Weakness generalized    Advanced directives, counseling/discussion    Cocaine dependence, in remission    Schizoaffective disorder, depressive type    Chronic obstructive pulmonary disease       Medication List with Changes/Refills   New Medications    DOXYCYCLINE (VIBRAMYCIN) 100 MG CAP    Take 1 capsule (100 mg total) by mouth 2 (two) times daily.    SULFAMETHOXAZOLE-TRIMETHOPRIM 800-160MG (BACTRIM DS) 800-160 MG TAB    Take 1 tablet by mouth 2 (two) times daily. for 10 days   Current Medications    ALCOHOL SWABS (ALCOHOL PREP PADS) PADM    Apply 1 each topically once daily.    ALLOPURINOL (ZYLOPRIM) 100 MG TABLET    Take 1 tablet (100 mg total) by mouth once daily.    ASPIRIN (ECOTRIN) 81 MG EC TABLET    Take 1 tablet (81 mg total) by mouth once daily.    ATORVASTATIN (LIPITOR) 40 MG TABLET    TAKE 1 TABLET EVERY EVENING    AZATHIOPRINE (IMURAN) 50 MG TAB    Take 1 tablet (50 mg total) by mouth once daily.    BACLOFEN (LIORESAL) 10 MG TABLET    Take 1 tablet (10 mg total) by mouth once daily.    BLOOD SUGAR DIAGNOSTIC (TRUE METRIX GLUCOSE TEST STRIP) STRP    Use with blood glucose meter kit to test blood sugar four times  daily    BLOOD-GLUCOSE METER (TRUE METRIX AIR GLUCOSE METER) KIT    TEST FOUR TIMES DAILY BEFORE MEALS  AND EVERY NIGHT    COLCHICINE (COLCRYS) 0.6 MG TABLET    Take 1 tablet (0.6 mg total) by mouth once daily.     DICLOFENAC SODIUM (VOLTAREN) 1 % GEL    Apply 2 g topically 4 (four) times daily.    FLUTICASONE-SALMETEROL DISKUS INHALER 250-50 MCG    Inhale 1 puff into the lungs 2 (two) times daily. Controller    FOOD SUPPLEMT, LACTOSE-REDUCED (ENSURE) LIQD    Take 118 mLs by mouth 3 (three) times daily with meals.    FUROSEMIDE (LASIX) 40 MG TABLET    TAKE 2 TABLETS EVERY MORNING  AND TAKE 1 TABLET EVERY EVENING    GLIMEPIRIDE (AMARYL) 2 MG TABLET    TAKE 1 TABLET (2 MG TOTAL) BY MOUTH BEFORE BREAKFAST.    INHALATION SPACING DEVICE    Use as directed for inhalation.    INSULIN (LANTUS SOLOSTAR U-100 INSULIN) GLARGINE 100 UNITS/ML (3ML) SUBQ PEN    INJECT 35 UNITS SUBCUTANEOUSLY EVERY EVENING    INSULIN ASPART U-100 (NOVOLOG FLEXPEN U-100 INSULIN) 100 UNIT/ML (3 ML) INPN PEN    INJECT 10 UNITS SUBCUTANEOUSLY THREE TIMES DAILY WITH MEALS    INSULIN SYRINGE-NEEDLE U-100 1 ML 31 GAUGE X 5/16 SYRG        IPRATROPIUM (ATROVENT) 0.02 % NEBULIZER SOLUTION    USE 1 VIAL VIA NEBULIZER FOUR TIMES DAILY    ISOSORBIDE MONONITRATE (IMDUR) 60 MG 24 HR TABLET    Take 1 tablet (60 mg total) by mouth once daily.    LANCETS 32 GAUGE MISC    1 lancet by Misc.(Non-Drug; Combo Route) route 2 (two) times daily.    LATANOPROST 0.005 % OPHTHALMIC SOLUTION    INSTILL 1 DROP INTO BOTH EYES EVERY EVENING    MAGNESIUM OXIDE (MAG-OX) 400 MG (241.3 MG MAGNESIUM) TABLET    Take 1 tablet (400 mg total) by mouth once daily.    METFORMIN (GLUCOPHAGE-XR) 500 MG XR 24HR TABLET    Take 2 tablets (1,000 mg total) by mouth 2 (two) times daily with meals.    METOPROLOL SUCCINATE (TOPROL-XL) 50 MG 24 HR TABLET    Take 1 tablet (50 mg total) by mouth once daily.    NITROGLYCERIN (NITROSTAT) 0.3 MG SL TABLET    PLACE 1 TABLET UNDER THE TONGUE EVERY 5 MINUTES AS NEEDED FOR CHEST PAIN, NO MORE THAN 3 TABLETS IN ONE DAY    OZEMPIC 1 MG/DOSE (2 MG/1.5 ML) PNIJ    Inject 0.75 mLs into the skin once a week.    PANTOPRAZOLE (PROTONIX) 40 MG TABLET    Take 1 tablet (40 mg  "total) by mouth once daily.    PEN NEEDLE, DIABETIC (BD ULTRA-FINE SHORT PEN NEEDLE) 31 GAUGE X 5/16" NDLE    Inject 1 each into the skin 3 (three) times daily.    PREDNISONE (DELTASONE) 5 MG TABLET    Take 1 tablet (5 mg total) by mouth 2 (two) times daily.    SERTRALINE (ZOLOFT) 100 MG TABLET    Take 1 tablet (100 mg total) by mouth every evening.    SILVER SULFADIAZINE 1% (SILVADENE) 1 % CREAM    Apply topically 2 (two) times daily.    TAMSULOSIN (FLOMAX) 0.4 MG CAP    Take 1 capsule (0.4 mg total) by mouth once daily.    TIOTROPIUM (SPIRIVA WITH HANDIHALER) 18 MCG INHALATION CAPSULE    Inhale 1 capsule (18 mcg total) into the lungs once daily. Controller    TRAZODONE (DESYREL) 100 MG TABLET    Take 2 tablets (200 mg total) by mouth every evening.    VIOS AEROSOL DELIVERY SYSTEM VIN    USE AS DIRESTED   Changed and/or Refilled Medications    Modified Medication Previous Medication    ELIQUIS 5 MG TAB ELIQUIS 5 mg Tab       TAKE 1 TABLET TWICE DAILY    Take 1 tablet (5 mg total) by mouth 2 (two) times daily.    GABAPENTIN (NEURONTIN) 800 MG TABLET gabapentin (NEURONTIN) 800 MG tablet       Take 1 tablet (800 mg total) by mouth 3 (three) times daily.    Take 1 tablet (800 mg total) by mouth 3 (three) times daily.    HYDROCODONE-ACETAMINOPHEN (NORCO)  MG PER TABLET HYDROcodone-acetaminophen (NORCO)  mg per tablet       Take 1 tablet by mouth every 8 (eight) hours as needed for Pain.    Take 1 tablet by mouth every 8 (eight) hours as needed for Pain.   Discontinued Medications    HYDROCODONE-ACETAMINOPHEN (NORCO)  MG PER TABLET    Take 1 tablet by mouth every 8 (eight) hours as needed for Pain.    LISINOPRIL 10 MG TABLET    Take 1 tablet (10 mg total) by mouth once daily.       Review of patient's allergies indicates:   Allergen Reactions    Protamine Hives     Urticaria, possible upper airway swelling       Past Surgical History:   Procedure Laterality Date    ABLATION OF ARRHYTHMOGENIC FOCUS " FOR ATRIAL FIBRILLATION N/A 2/27/2020    Procedure: Ablation atrial fibrillation;  Surgeon: Gio Brown MD;  Location: [facility] EP LAB;  Service: Cardiology;  Laterality: N/A;  afib, DAMIEN (cx if SR), PVI, PITO, anes, MB, 3 Prep    CHOLECYSTECTOMY      CLOSURE OF WOUND Left 7/1/2020    Procedure: CLOSURE, WOUND;  Surgeon: Barb Veras DPM;  Location: [facility] OR;  Service: Podiatry;  Laterality: Left;    SELECTIVE INJECTION OF ANESTHETIC AGENT AROUND LUMBAR SPINAL NERVE ROOT BY TRANSFORAMINAL APPROACH Left 6/11/2020    Procedure: BLOCK, SPINAL NERVE ROOT, LUMBAR, SELECTIVE, TRANSFORAMINAL APPROACH Left L4-5, L5-S1 TFESI with RN IV sedation;  Surgeon: Dillan Tsai MD;  Location: [facility] PAIN MGT;  Service: Pain Management;  Laterality: Left;    TOE AMPUTATION Left 6/29/2020    Procedure: AMPUTATION, TOE;  Surgeon: Barb Veras DPM;  Location: [facility] OR;  Service: Podiatry;  Laterality: Left;  great toe       Family History   Problem Relation Age of Onset    Glaucoma Mother     Cataracts Mother     Cataracts Father     Glaucoma Sister     Cataracts Sister     Glaucoma Maternal Aunt     No Known Problems Brother     No Known Problems Daughter     No Known Problems Son     Prostate cancer Neg Hx        Social History     Socioeconomic History    Marital status: Legally      Spouse name: Not on file    Number of children: 5    Years of education: Not on file    Highest education level: Not on file   Occupational History    Occupation: disabled   Social Needs    Financial resource strain: Not on file    Food insecurity     Worry: Not on file     Inability: Not on file    Transportation needs     Medical: Yes     Non-medical: Not on file   Tobacco Use    Smoking status: Never Smoker    Smokeless tobacco: Never Used   Substance and Sexual Activity    Alcohol use: Not Currently    Drug use: No    Sexual activity: Yes     Partners: Female   Lifestyle    Physical activity     Days per week:  "Not on file     Minutes per session: Not on file    Stress: Rather much   Relationships    Social connections     Talks on phone: Not on file     Gets together: Not on file     Attends [employer] service: Not on file     Active member of club or organization: Not on file     Attends meetings of clubs or organizations: Not on file     Relationship status: Not on file   Other Topics Concern    Not on file   Social History Narrative    Utilizing [employer] transportation which has been unreliable.        Vitals:    08/31/20 1005   BP: 120/76   Pulse: 87   Weight: 86.2 kg (190 lb 0.6 oz)   Height: 5' 5" (1.651 m)       Review of Systems   Constitutional: Negative for chills and fever.   Respiratory: Negative for shortness of breath.    Cardiovascular: Negative for chest pain, palpitations, orthopnea, claudication and leg swelling.   Gastrointestinal: Negative for diarrhea, nausea and vomiting.   Musculoskeletal: Negative for joint pain.   Skin: Negative for rash.   Neurological: Positive for tingling and sensory change.   Psychiatric/Behavioral: Negative.          Objective:      Physical examination: General: Patient is in no acute distress, alert and oriented x 3.  Dressing and wounds vac intact to left foot clean, dry, and intact.   Patient presents ambulating with walker. .   Lower Extremity Exam:  Vascular: Dorsalis pedis and Posterior tibial pulses palpable on left foot.  Capillary fill time <4 sec to toes on left foot. Minimal edema noted on left foot.   Dermatologic: Wound dehiscence measurement is 2cm x 1cm x 0.3cm. (+) decreased exposed bone. Negative erythema, drainage, or increased temp noted to surgical site.   Neurological: Light touch sensation intact to left foot.   Musculoskeletal: Negative pain on palpation/ROM of left foot. Left hallux amputation noted.           Assessment:       Encounter Diagnoses   Name Primary?    Status post amputation of left great toe - Left Foot Yes    Dehiscence of " amputation stump - Left Foot     Acute post-operative pain - Left Foot          Plan:   Status post amputation of left great toe - Left Foot  -     Discontinue: HYDROcodone-acetaminophen (NORCO)  mg per tablet; Take 1 tablet by mouth every 8 (eight) hours as needed for Pain.  Dispense: 20 tablet; Refill: 0    Dehiscence of amputation stump - Left Foot  -     Discontinue: HYDROcodone-acetaminophen (NORCO)  mg per tablet; Take 1 tablet by mouth every 8 (eight) hours as needed for Pain.  Dispense: 20 tablet; Refill: 0    Acute post-operative pain - Left Foot  -     Discontinue: HYDROcodone-acetaminophen (NORCO)  mg per tablet; Take 1 tablet by mouth every 8 (eight) hours as needed for Pain.  Dispense: 20 tablet; Refill: 0      I counseled the patient on his conditions, regarding findings of my examination, my impressions, and usual treatment plan.   With patient's permission, the left foot wound was irrigated with sterile saline and bleeding was controlled with direct pressure. Minimal blood loss.   Left foot edged painted with Gentian Violet and wet to dry dressing applied with, gauze, kerlix, and ACE.   Patient instructed to keep dressing dry, clean and intact until home health comes out.   Patient instructed to continue to ambulate in surgical shoe and walker.   Home health to re-apply wound vac on Wednesday.   Refill completed for Norco 10-325mg to be taken as needed for pain. Patient advised on the possible elevation of blood pressure or heart effects and caution to take pills as needed and to discontinue use if symptoms arise, patient agreed.   Patient should call the clinic immediately if any signs of infection such as fever chills sweats increased redness or pain.  Patient to return in 1 week or sooner if needed.                  Barb Veras, MONTY  [facility]

## 2020-09-14 ENCOUNTER — OUTPATIENT CASE MANAGEMENT (OUTPATIENT)
Dept: ADMINISTRATIVE | Facility: OTHER | Age: 65
End: 2020-09-14

## 2020-09-14 NOTE — PROGRESS NOTES
Outpatient Care Management   - Care Plan Follow Up    Patient: Crow Green  MRN:  [id number]  Date of Service:  9/14/2020  Completed by:  Amanda Whipple LCSW  Referral Date: 06/29/2020  Program: Case Management (High Risk)    Reason for Visit   Patient presents with    Update Plan Of Care     9/14/20       Brief Summary: Phoned [facility], they stated that they will mail the application to the patient. No local agency information provided.   Phoned patient. Patient stated that he plans to go to [facility] when Dr. Veras clears him medically. He stated that he has arranged transportation for the 9/16 appt and has called to confirm. Discussed [facility] Program. He stated that he would call when it arrives. Patient agreed to follow up with LCSW in 2 weeks.     Complex Care Plan     Care plan was discussed and completed today with input from patient and/or caregiver.    Patient Instructions     Instructions were provided via the [facility] patient resources and are available for the patient to view on the patient portal.    Follow up in about 2 weeks (around 9/28/2020) for IOP follow up, [facility] assistance. .    Todays OPCM Self-Management Care Plan was developed with the patients/caregivers input and was based on identified barriers from todays assessment.  Goals were written today with the patient/caregiver and the patient has agreed to work towards these goals to improve his/her overall well-being. Patient verbalized understanding of the care plan, goals, and all of today's instructions. Encouraged patient/caregiver to communicate with his/her physician and health care team about health conditions and the treatment plan.  Provided my contact information today and encouraged patient/caregiver to call me with any questions as needed.

## 2020-09-15 ENCOUNTER — TELEPHONE (OUTPATIENT)
Dept: PODIATRY | Facility: CLINIC | Age: 65
End: 2020-09-15

## 2020-09-16 ENCOUNTER — OFFICE VISIT (OUTPATIENT)
Dept: PODIATRY | Facility: CLINIC | Age: 65
End: 2020-09-16
Payer: MEDICARE

## 2020-09-16 ENCOUNTER — TELEPHONE (OUTPATIENT)
Dept: INTERNAL MEDICINE | Facility: CLINIC | Age: 65
End: 2020-09-16

## 2020-09-16 ENCOUNTER — OUTPATIENT CASE MANAGEMENT (OUTPATIENT)
Dept: ADMINISTRATIVE | Facility: OTHER | Age: 65
End: 2020-09-16

## 2020-09-16 VITALS
HEIGHT: 65 IN | WEIGHT: 190 LBS | HEART RATE: 81 BPM | BODY MASS INDEX: 31.65 KG/M2 | SYSTOLIC BLOOD PRESSURE: 130 MMHG | DIASTOLIC BLOOD PRESSURE: 81 MMHG

## 2020-09-16 DIAGNOSIS — G89.18 ACUTE POST-OPERATIVE PAIN: ICD-10-CM

## 2020-09-16 DIAGNOSIS — Z89.412 STATUS POST AMPUTATION OF LEFT GREAT TOE: Primary | ICD-10-CM

## 2020-09-16 DIAGNOSIS — T87.81 DEHISCENCE OF AMPUTATION STUMP: ICD-10-CM

## 2020-09-16 DIAGNOSIS — L97.522 ULCER OF LEFT FOOT, WITH FAT LAYER EXPOSED: ICD-10-CM

## 2020-09-16 DIAGNOSIS — Z09 FOLLOW UP: Primary | ICD-10-CM

## 2020-09-16 PROCEDURE — 99999 PR PBB SHADOW E&M-EST. PATIENT-LVL V: CPT | Mod: PBBFAC,HCNC,, | Performed by: PODIATRIST

## 2020-09-16 PROCEDURE — 99024 PR POST-OP FOLLOW-UP VISIT: ICD-10-PCS | Mod: HCNC,S$GLB,, | Performed by: PODIATRIST

## 2020-09-16 PROCEDURE — 1125F PR PAIN SEVERITY QUANTIFIED, PAIN PRESENT: ICD-10-PCS | Mod: HCNC,S$GLB,, | Performed by: PODIATRIST

## 2020-09-16 PROCEDURE — 99999 PR PBB SHADOW E&M-EST. PATIENT-LVL V: ICD-10-PCS | Mod: PBBFAC,HCNC,, | Performed by: PODIATRIST

## 2020-09-16 PROCEDURE — 3008F PR BODY MASS INDEX (BMI) DOCUMENTED: ICD-10-PCS | Mod: HCNC,CPTII,S$GLB, | Performed by: PODIATRIST

## 2020-09-16 PROCEDURE — 1125F AMNT PAIN NOTED PAIN PRSNT: CPT | Mod: HCNC,S$GLB,, | Performed by: PODIATRIST

## 2020-09-16 PROCEDURE — 3008F BODY MASS INDEX DOCD: CPT | Mod: HCNC,CPTII,S$GLB, | Performed by: PODIATRIST

## 2020-09-16 PROCEDURE — 99024 POSTOP FOLLOW-UP VISIT: CPT | Mod: HCNC,S$GLB,, | Performed by: PODIATRIST

## 2020-09-16 RX ORDER — HYDROCODONE BITARTRATE AND ACETAMINOPHEN 10; 325 MG/1; MG/1
1 TABLET ORAL EVERY 8 HOURS PRN
Qty: 20 TABLET | Refills: 0 | Status: SHIPPED | OUTPATIENT
Start: 2020-09-18 | End: 2020-09-23 | Stop reason: SDUPTHER

## 2020-09-16 NOTE — PROGRESS NOTES
Subjective:     Patient ID: Crow Green is a 65 y.o. male.    Chief Complaint: Post-op Evaluation (Post Op )    HPI: This 65 year old male returns to the clinic 9 weeks status post left hallux amputation procedure. Patient has no complaints of fever chills or sweats. Positive pain, rates pain 5/10. Patient states dressing was kept dry, clean, and intact. Patient states home health has been coming out. Patient states the wound vac was re-applied on Wednesday. Patient has no other pedal complaints today.        Patient Active Problem List   Diagnosis    ED (erectile dysfunction)    Non-proliferative diabetic retinopathy, mild, both eyes    Hypertension associated with diabetes    Diabetic polyneuropathy    Nonobstructive coronary artery disease of native artery of native heart    Chronic combined systolic and diastolic HFrEF of 45%    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, with long-term current use of insulin    SANDRITA on CPAP    Moderate asthma    Psychophysiological insomnia    Nightmares    Sleep related gastroesophageal reflux disease    Inadequate sleep hygiene    PAF (paroxysmal atrial fibrillation)    At risk for medication noncompliance    Obesity (BMI 30.0-34.9)    Indeterminate stage chronic open angle glaucoma    Right hip pain    Gait instability    Chronic right shoulder pain    Arthritis    Left sided sciatica    Dyspnea on exertion    Osteoarthritis of spine with radiculopathy, lumbar region    HNP (herniated nucleus pulposus), lumbar    Gastroesophageal reflux disease without esophagitis    Chronic diastolic heart failure    Hypogonadism in male    Disorder of parathyroid gland    Hyperlipidemia associated with type 2 diabetes mellitus    JOSE MARIA (acute kidney injury)    LRTI (lower respiratory tract infection)    Physical deconditioning    Acute pain of left shoulder    Traumatic tear of left rotator  cuff    Atrial fibrillation with RVR    Other chronic pain    Left shoulder pain    Complete tear of left rotator cuff    Stage 3 chronic kidney disease    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 associated with type 2 diabetes mellitus    Lumbar radiculopathy    Diabetic ulcer of toe of left foot associated with type 2 diabetes mellitus, with necrosis of muscle    Ulcer of left foot    Elevated troponin    Hypotension due to medication    Dermatomyositis    Postprocedural hypotension    Follow up    Weakness generalized    Advanced directives, counseling/discussion    Cocaine dependence, in remission    Schizoaffective disorder, depressive type    Chronic obstructive pulmonary disease       Medication List with Changes/Refills   New Medications    DOXYCYCLINE (VIBRAMYCIN) 100 MG CAP    Take 1 capsule (100 mg total) by mouth 2 (two) times daily.    SULFAMETHOXAZOLE-TRIMETHOPRIM 800-160MG (BACTRIM DS) 800-160 MG TAB    Take 1 tablet by mouth 2 (two) times daily. for 10 days   Current Medications    ALCOHOL SWABS (ALCOHOL PREP PADS) PADM    Apply 1 each topically once daily.    ALLOPURINOL (ZYLOPRIM) 100 MG TABLET    Take 1 tablet (100 mg total) by mouth once daily.    ASPIRIN (ECOTRIN) 81 MG EC TABLET    Take 1 tablet (81 mg total) by mouth once daily.    ATORVASTATIN (LIPITOR) 40 MG TABLET    TAKE 1 TABLET EVERY EVENING    AZATHIOPRINE (IMURAN) 50 MG TAB    Take 1 tablet (50 mg total) by mouth once daily.    BACLOFEN (LIORESAL) 10 MG TABLET    Take 1 tablet (10 mg total) by mouth once daily.    BLOOD SUGAR DIAGNOSTIC (TRUE METRIX GLUCOSE TEST STRIP) STRP    Use with blood glucose meter kit to test blood sugar four times  daily    BLOOD-GLUCOSE METER (TRUE METRIX AIR GLUCOSE METER) KIT    TEST FOUR TIMES DAILY BEFORE MEALS  AND EVERY NIGHT    COLCHICINE (COLCRYS) 0.6 MG TABLET    Take 1 tablet (0.6 mg total) by mouth once daily.    DICLOFENAC SODIUM (VOLTAREN)  1 % GEL    Apply 2 g topically 4 (four) times daily.    ELIQUIS 5 MG TAB    TAKE 1 TABLET TWICE DAILY    FLUTICASONE-SALMETEROL DISKUS INHALER 250-50 MCG    Inhale 1 puff into the lungs 2 (two) times daily. Controller    FOOD SUPPLEMT, LACTOSE-REDUCED (ENSURE) LIQD    Take 118 mLs by mouth 3 (three) times daily with meals.    FUROSEMIDE (LASIX) 40 MG TABLET    TAKE 2 TABLETS EVERY MORNING  AND TAKE 1 TABLET EVERY EVENING    GLIMEPIRIDE (AMARYL) 2 MG TABLET    TAKE 1 TABLET (2 MG TOTAL) BY MOUTH BEFORE BREAKFAST.    INHALATION SPACING DEVICE    Use as directed for inhalation.    INSULIN (LANTUS SOLOSTAR U-100 INSULIN) GLARGINE 100 UNITS/ML (3ML) SUBQ PEN    INJECT 35 UNITS SUBCUTANEOUSLY EVERY EVENING    INSULIN ASPART U-100 (NOVOLOG FLEXPEN U-100 INSULIN) 100 UNIT/ML (3 ML) INPN PEN    INJECT 10 UNITS SUBCUTANEOUSLY THREE TIMES DAILY WITH MEALS    INSULIN SYRINGE-NEEDLE U-100 1 ML 31 GAUGE X 5/16 SYRG        IPRATROPIUM (ATROVENT) 0.02 % NEBULIZER SOLUTION    USE 1 VIAL VIA NEBULIZER FOUR TIMES DAILY    ISOSORBIDE MONONITRATE (IMDUR) 60 MG 24 HR TABLET    Take 1 tablet (60 mg total) by mouth once daily.    LANCETS 32 GAUGE MISC    1 lancet by Misc.(Non-Drug; Combo Route) route 2 (two) times daily.    LATANOPROST 0.005 % OPHTHALMIC SOLUTION    INSTILL 1 DROP INTO BOTH EYES EVERY EVENING    MAGNESIUM OXIDE (MAG-OX) 400 MG (241.3 MG MAGNESIUM) TABLET    Take 1 tablet (400 mg total) by mouth once daily.    METFORMIN (GLUCOPHAGE-XR) 500 MG XR 24HR TABLET    Take 2 tablets (1,000 mg total) by mouth 2 (two) times daily with meals.    METOPROLOL SUCCINATE (TOPROL-XL) 50 MG 24 HR TABLET    Take 1 tablet (50 mg total) by mouth once daily.    NITROGLYCERIN (NITROSTAT) 0.3 MG SL TABLET    PLACE 1 TABLET UNDER THE TONGUE EVERY 5 MINUTES AS NEEDED FOR CHEST PAIN, NO MORE THAN 3 TABLETS IN ONE DAY    OZEMPIC 1 MG/DOSE (2 MG/1.5 ML) PNIJ    Inject 0.75 mLs into the skin once a week.    PANTOPRAZOLE (PROTONIX) 40 MG TABLET    Take 1  "tablet (40 mg total) by mouth once daily.    PEN NEEDLE, DIABETIC (BD ULTRA-FINE SHORT PEN NEEDLE) 31 GAUGE X 5/16" NDLE    Inject 1 each into the skin 3 (three) times daily.    PREDNISONE (DELTASONE) 5 MG TABLET    Take 1 tablet (5 mg total) by mouth 2 (two) times daily.    SERTRALINE (ZOLOFT) 100 MG TABLET    Take 1 tablet (100 mg total) by mouth every evening.    SILVER SULFADIAZINE 1% (SILVADENE) 1 % CREAM    Apply topically 2 (two) times daily.    TAMSULOSIN (FLOMAX) 0.4 MG CAP    Take 1 capsule (0.4 mg total) by mouth once daily.    TIOTROPIUM (SPIRIVA WITH HANDIHALER) 18 MCG INHALATION CAPSULE    Inhale 1 capsule (18 mcg total) into the lungs once daily. Controller    TRAZODONE (DESYREL) 100 MG TABLET    Take 2 tablets (200 mg total) by mouth every evening.    VIOS AEROSOL DELIVERY SYSTEM VIN    USE AS DIRESTED   Changed and/or Refilled Medications    Modified Medication Previous Medication    GABAPENTIN (NEURONTIN) 800 MG TABLET gabapentin (NEURONTIN) 800 MG tablet       Take 1 tablet (800 mg total) by mouth 3 (three) times daily.    Take 1 tablet (800 mg total) by mouth 3 (three) times daily.    HYDROCODONE-ACETAMINOPHEN (NORCO)  MG PER TABLET HYDROcodone-acetaminophen (NORCO)  mg per tablet       Take 1 tablet by mouth every 8 (eight) hours as needed for Pain.    Take 1 tablet by mouth every 8 (eight) hours as needed for Pain.       Review of patient's allergies indicates:   Allergen Reactions    Protamine Hives     Urticaria, possible upper airway swelling       Past Surgical History:   Procedure Laterality Date    ABLATION OF ARRHYTHMOGENIC FOCUS FOR ATRIAL FIBRILLATION N/A 2/27/2020    Procedure: Ablation atrial fibrillation;  Surgeon: Gio Brown MD;  Location: [facility] EP LAB;  Service: Cardiology;  Laterality: N/A;  afib, DAMIEN (cx if SR), PVI, PITO, anes, MB, 3 Prep    CHOLECYSTECTOMY      CLOSURE OF WOUND Left 7/1/2020    Procedure: CLOSURE, WOUND;  Surgeon: Barb Veras DPM;  " Location: [facility] OR;  Service: Podiatry;  Laterality: Left;    SELECTIVE INJECTION OF ANESTHETIC AGENT AROUND LUMBAR SPINAL NERVE ROOT BY TRANSFORAMINAL APPROACH Left 6/11/2020    Procedure: BLOCK, SPINAL NERVE ROOT, LUMBAR, SELECTIVE, TRANSFORAMINAL APPROACH Left L4-5, L5-S1 TFESI with RN IV sedation;  Surgeon: Dillan Tsai MD;  [facility];  Service: Pain Management;  Laterality: Left;    TOE AMPUTATION Left 6/29/2020    Procedure: AMPUTATION, TOE;  Surgeon: Barb Veras DPM;  Location: [facility] OR;  Service: Podiatry;  Laterality: Left;  great toe       Family History   Problem Relation Age of Onset    Glaucoma Mother     Cataracts Mother     Cataracts Father     Glaucoma Sister     Cataracts Sister     Glaucoma Maternal Aunt     No Known Problems Brother     No Known Problems Daughter     No Known Problems Son     Prostate cancer Neg Hx        Social History     Socioeconomic History    Marital status: Legally      Spouse name: Not on file    Number of children: 5    Years of education: Not on file    Highest education level: Not on file   Occupational History    Occupation: disabled   Social Needs    Financial resource strain: Not on file    Food insecurity     Worry: Not on file     Inability: Not on file    Transportation needs     Medical: Yes     Non-medical: Not on file   Tobacco Use    Smoking status: Never Smoker    Smokeless tobacco: Never Used   Substance and Sexual Activity    Alcohol use: Not Currently    Drug use: No    Sexual activity: Yes     Partners: Female   Lifestyle    Physical activity     Days per week: Not on file     Minutes per session: Not on file    Stress: Rather much   Relationships    Social connections     Talks on phone: Not on file     Gets together: Not on file     Attends Christian service: Not on file     Active member of club or organization: Not on file     Attends meetings of clubs or organizations: Not on file     Relationship  "status: Not on file   Other Topics Concern    Not on file   Social History Narrative    Utilizing [employer] transportation which has been unreliable.        Vitals:    09/11/20 1009   BP: 130/76   Pulse: 78   Weight: 86.2 kg (190 lb 0.6 oz)   Height: 5' 5" (1.651 m)       Review of Systems   Constitutional: Negative for chills and fever.   Respiratory: Negative for shortness of breath.    Cardiovascular: Negative for chest pain, palpitations, orthopnea, claudication and leg swelling.   Gastrointestinal: Negative for diarrhea, nausea and vomiting.   Musculoskeletal: Negative for joint pain.   Skin: Negative for rash.   Neurological: Positive for tingling and sensory change.   Psychiatric/Behavioral: Negative.              Objective:      Physical examination: General: Patient is in no acute distress, alert and oriented x 3.  Wound vac and dressing to left foot clean, dry, and intact.   Patient presents ambulating in surgical shoe with walker.   Lower Extremity Exam:  Vascular: Dorsalis pedis and Posterior tibial pulses palpable on left foot.  Capillary fill time <4 sec to toes on left foot. Mild edema noted on left foot.   Dermatologic: Open wound dehiscence measurement is 2cm x 1cm x 0.3cm at left hallux amputation site. Maceration noted  Fluctuance noted to left medial 1st metatarsal head. Positive erythema, drainage, and increased temp noted to surgical site.   Neurological: Light touch sensation intact to left foot.   Musculoskeletal: Positive pain on palpation/ROM of left foot.                       Assessment:       Encounter Diagnoses   Name Primary?    Abscess of bursa of left foot Yes    Status post amputation of left great toe - Left Foot     Dehiscence of amputation stump - Left Foot     Acute post-operative pain - Left Foot     Cellulitis of left foot     Cellulitis of left foot due to methicillin-resistant Staphylococcus aureus     Osteoarthritis of spine with radiculopathy, lumbar region     HNP " (herniated nucleus pulposus), lumbar          Plan:   Abscess of bursa of left foot  -     Procalcitonin; Future; Expected date: 09/11/2020  -     Sedimentation rate; Future; Expected date: 09/11/2020  -     C-Reactive Protein; Future; Expected date: 09/11/2020  -     sulfamethoxazole-trimethoprim 800-160mg (BACTRIM DS) 800-160 mg Tab; Take 1 tablet by mouth 2 (two) times daily. for 10 days  Dispense: 20 tablet; Refill: 0  -     doxycycline (VIBRAMYCIN) 100 MG Cap; Take 1 capsule (100 mg total) by mouth 2 (two) times daily.  Dispense: 20 capsule; Refill: 0    Status post amputation of left great toe - Left Foot  -     Procalcitonin; Future; Expected date: 09/11/2020  -     Sedimentation rate; Future; Expected date: 09/11/2020  -     C-Reactive Protein; Future; Expected date: 09/11/2020  -     sulfamethoxazole-trimethoprim 800-160mg (BACTRIM DS) 800-160 mg Tab; Take 1 tablet by mouth 2 (two) times daily. for 10 days  Dispense: 20 tablet; Refill: 0  -     doxycycline (VIBRAMYCIN) 100 MG Cap; Take 1 capsule (100 mg total) by mouth 2 (two) times daily.  Dispense: 20 capsule; Refill: 0  -     Discontinue: HYDROcodone-acetaminophen (NORCO)  mg per tablet; Take 1 tablet by mouth every 8 (eight) hours as needed for Pain.  Dispense: 20 tablet; Refill: 0  -     HYDROcodone-acetaminophen (NORCO)  mg per tablet; Take 1 tablet by mouth every 8 (eight) hours as needed for Pain.  Dispense: 20 tablet; Refill: 0    Dehiscence of amputation stump - Left Foot  -     Discontinue: HYDROcodone-acetaminophen (NORCO)  mg per tablet; Take 1 tablet by mouth every 8 (eight) hours as needed for Pain.  Dispense: 20 tablet; Refill: 0  -     HYDROcodone-acetaminophen (NORCO)  mg per tablet; Take 1 tablet by mouth every 8 (eight) hours as needed for Pain.  Dispense: 20 tablet; Refill: 0    Acute post-operative pain - Left Foot  -     Discontinue: HYDROcodone-acetaminophen (NORCO)  mg per tablet; Take 1 tablet by mouth  every 8 (eight) hours as needed for Pain.  Dispense: 20 tablet; Refill: 0  -     HYDROcodone-acetaminophen (NORCO)  mg per tablet; Take 1 tablet by mouth every 8 (eight) hours as needed for Pain.  Dispense: 20 tablet; Refill: 0    Cellulitis of left foot    Cellulitis of left foot due to methicillin-resistant Staphylococcus aureus  -     CBC auto differential; Future; Expected date: 09/11/2020    Osteoarthritis of spine with radiculopathy, lumbar region  -     gabapentin (NEURONTIN) 800 MG tablet; Take 1 tablet (800 mg total) by mouth 3 (three) times daily.  Dispense: 270 tablet; Refill: 4    HNP (herniated nucleus pulposus), lumbar  -     gabapentin (NEURONTIN) 800 MG tablet; Take 1 tablet (800 mg total) by mouth 3 (three) times daily.  Dispense: 270 tablet; Refill: 4      I counseled the patient on his conditions, regarding findings of my examination, my impressions, and usual treatment plan.   Left medial 1st metatarsal abscess expressed with purulent drainage noted. Wound was irrigated with sterile saline until no purulent drainage expressed and bleeding was controlled with direct pressure. Minimal blood loss.    Left foot edged painted with Gentian Violet and Sarah applied with wound base and (medial) packed with  1/4' iodoform packing and dressed with, gauze, kerlix, and ACE.   Patient instructed to keep dressing dry, clean and intact until home health comes out.   Home health to discontinue wound vac application. Subsequent home health orders completed.   Patient instructed to continue to ambulate in surgical shoe and walker.   Prescription written for Bactrim DS and Doxycycline 100mg to both be taken twice daily.   Ordered CBC, ESR, CRP, and Procalcitonin to be completed today.   Refill completed for Norco 10-325mg to be taken as needed for pain. Patient advised on the possible elevation of blood pressure or heart effects and caution to take pills as needed and to discontinue use if symptoms arise,  patient agreed.   Patient should call the clinic immediately if any signs of infection such as fever chills sweats increased redness or pain.  Patient to return in 1 week or sooner if needed.                     Barb Veras DPM  [facility]

## 2020-09-16 NOTE — PROGRESS NOTES
Outpatient Care Management  Plan of Care Follow Up Visit    Patient: Crow Green  MRN: [id number]  Date of Service: 09/16/2020  Completed by: Dang Clay RN  Referral Date: 06/29/2020  Program: Case Management (High Risk)    No chief complaint on file.      Brief Summary: Phone contact with pt today for follow up. Several care plan tasks were reviewed and he reports poor appetite recently with poor compliance to recommended low carb diet. He advised he ate a serving of mandarin oranges(canned) for breakfast this AM and knows he needs to eat something else because he has to go out for a podiatry appt for eval of his wound, but he does not feel hungry. Encouraged him to add Ensure, diab formula, for meal replacements to help with nutritional status, but he advised he cannot afford it. Reviewed upcoming appts and next scheduled CM follow up in two weeks. He voiced understanding and agreement with this plan for follow up.     Patient Summary     Involvement of Care:  Do I have permission to speak with other family members about your care?   Yes, relative, Erica    Patient Reported Labs & Vitals:  1.  Any Patient Reported Labs & Vitals?   Yes  2.  Patient Reported Blood Pressure:     3.  Patient Reported Pulse:     4.  Patient Reported Weight (Kg):     5.  Patient Reported Blood Glucose (mg/dl):   119    Medical and social history was reviewed with patient and/or caregiver.     Clinical Assessment     Reviewed and provided basic information on available community resources for mental health, transportation, wellness resources, and palliative care programs with patient and/or caregiver.     Complex Care Plan     Care plan was discussed and completed today with input from patient and/or caregiver.    Patient Instructions     Instructions were provided via the [facility] patient resources and are available for the patient to view on the patient portal.    Next Steps: Message sent to PCP and podiatrist of pt's  report of frequent low blood sugars recently. Follow up in two weeks or as needed. Plan to review remaining care plan tasks. Dang Clay RN    No follow-ups on file.    Todays OPCM Self-Management Care Plan was developed with the patients/caregivers input and was based on identified barriers from todays assessment.  Goals were written today with the patient/caregiver and the patient has agreed to work towards these goals to improve his/her overall well-being. Patient verbalized understanding of the care plan, goals, and all of today's instructions. Encouraged patient/caregiver to communicate with his/her physician and health care team about health conditions and the treatment plan.  Provided my contact information today and encouraged patient/caregiver to call me with any questions as needed.

## 2020-09-16 NOTE — TELEPHONE ENCOUNTER
----- Message from Dang Clay RN sent at 9/16/2020 11:39 AM CDT -----  Mr Green is reporting poor appetite with decreased caloric intake recently. He reports being unable to afford nutritional supplements, like Glucerna. He reports his blood sugars are controlled, checking it 6 X a day with none > 200 in the recent past. He does report having some low blood sugars recently, but no severe hypoglycemia episodes.    Just needed to advise since he reported this to me this morning. Thank you.     Kindest Regards,     Dang Clay RN, [facility]  Outpatient Case Management  [phone]  Ext [phone]  [email or web address]

## 2020-09-16 NOTE — PROGRESS NOTES
Subjective:     Patient ID: Crow Green is a 65 y.o. male.    Chief Complaint: Post-op Evaluation (rates pain 4/10. wears post op shoe to left foot. diabetic Pt. PCP Dr. Lambert.)    HPI: This 65 year old male returns to the clinic 10 weeks status post left hallux amputation procedure. Patient has no complaints of fever chills or sweats. Positive pain, rates pain 5/10. Patient states dressing was kept dry, clean, and intact. Patient states home health has been coming out. Patient states the wound vac was re-applied on Wednesday. Patient has no other pedal complaints today.    Patient Active Problem List   Diagnosis    ED (erectile dysfunction)    Non-proliferative diabetic retinopathy, mild, both eyes    Hypertension associated with diabetes    Diabetic polyneuropathy    Nonobstructive coronary artery disease of native artery of native heart    Chronic combined systolic and diastolic HFrEF of 45%    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, with long-term current use of insulin    SANDRITA on CPAP    Moderate asthma    Psychophysiological insomnia    Nightmares    Sleep related gastroesophageal reflux disease    Inadequate sleep hygiene    PAF (paroxysmal atrial fibrillation)    At risk for medication noncompliance    Obesity (BMI 30.0-34.9)    Indeterminate stage chronic open angle glaucoma    Right hip pain    Gait instability    Chronic right shoulder pain    Arthritis    Left sided sciatica    Dyspnea on exertion    Osteoarthritis of spine with radiculopathy, lumbar region    HNP (herniated nucleus pulposus), lumbar    Gastroesophageal reflux disease without esophagitis    Chronic diastolic heart failure    Hypogonadism in male    Disorder of parathyroid gland    Hyperlipidemia associated with type 2 diabetes mellitus    JOSE MARIA (acute kidney injury)    LRTI (lower respiratory tract infection)    Physical deconditioning     Acute pain of left shoulder    Traumatic tear of left rotator cuff    Atrial fibrillation with RVR    Other chronic pain    Left shoulder pain    Complete tear of left rotator cuff    Stage 3 chronic kidney disease    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 associated with type 2 diabetes mellitus    Lumbar radiculopathy    Diabetic ulcer of toe of left foot associated with type 2 diabetes mellitus, with necrosis of muscle    Ulcer of left foot    Elevated troponin    Hypotension due to medication    Dermatomyositis    Postprocedural hypotension    Follow up    Weakness generalized    Advanced directives, counseling/discussion    Cocaine dependence, in remission    Schizoaffective disorder, depressive type    Chronic obstructive pulmonary disease       Medication List with Changes/Refills   Current Medications    ALCOHOL SWABS (ALCOHOL PREP PADS) PADM    Apply 1 each topically once daily.    ALLOPURINOL (ZYLOPRIM) 100 MG TABLET    Take 1 tablet (100 mg total) by mouth once daily.    ASPIRIN (ECOTRIN) 81 MG EC TABLET    Take 1 tablet (81 mg total) by mouth once daily.    ATORVASTATIN (LIPITOR) 40 MG TABLET    TAKE 1 TABLET EVERY EVENING    AZATHIOPRINE (IMURAN) 50 MG TAB    Take 1 tablet (50 mg total) by mouth once daily.    BACLOFEN (LIORESAL) 10 MG TABLET    Take 1 tablet (10 mg total) by mouth once daily.    BLOOD SUGAR DIAGNOSTIC (TRUE METRIX GLUCOSE TEST STRIP) STRP    Use with blood glucose meter kit to test blood sugar four times  daily    BLOOD-GLUCOSE METER (TRUE METRIX AIR GLUCOSE METER) KIT    TEST FOUR TIMES DAILY BEFORE MEALS  AND EVERY NIGHT    COLCHICINE (COLCRYS) 0.6 MG TABLET    Take 1 tablet (0.6 mg total) by mouth once daily.    DICLOFENAC SODIUM (VOLTAREN) 1 % GEL    Apply 2 g topically 4 (four) times daily.    DOXYCYCLINE (VIBRAMYCIN) 100 MG CAP    Take 1 capsule (100 mg total) by mouth 2 (two) times daily.    ELIQUIS 5 MG TAB    TAKE 1  TABLET TWICE DAILY    FLUTICASONE-SALMETEROL DISKUS INHALER 250-50 MCG    Inhale 1 puff into the lungs 2 (two) times daily. Controller    FOOD SUPPLEMT, LACTOSE-REDUCED (ENSURE) LIQD    Take 118 mLs by mouth 3 (three) times daily with meals.    FUROSEMIDE (LASIX) 40 MG TABLET    TAKE 2 TABLETS EVERY MORNING  AND TAKE 1 TABLET EVERY EVENING    GABAPENTIN (NEURONTIN) 800 MG TABLET    Take 1 tablet (800 mg total) by mouth 3 (three) times daily.    GLIMEPIRIDE (AMARYL) 2 MG TABLET    TAKE 1 TABLET (2 MG TOTAL) BY MOUTH BEFORE BREAKFAST.    INHALATION SPACING DEVICE    Use as directed for inhalation.    INSULIN (LANTUS SOLOSTAR U-100 INSULIN) GLARGINE 100 UNITS/ML (3ML) SUBQ PEN    INJECT 35 UNITS SUBCUTANEOUSLY EVERY EVENING    INSULIN ASPART U-100 (NOVOLOG FLEXPEN U-100 INSULIN) 100 UNIT/ML (3 ML) INPN PEN    INJECT 10 UNITS SUBCUTANEOUSLY THREE TIMES DAILY WITH MEALS    INSULIN SYRINGE-NEEDLE U-100 1 ML 31 GAUGE X 5/16 SYRG        IPRATROPIUM (ATROVENT) 0.02 % NEBULIZER SOLUTION    USE 1 VIAL VIA NEBULIZER FOUR TIMES DAILY    ISOSORBIDE MONONITRATE (IMDUR) 60 MG 24 HR TABLET    Take 1 tablet (60 mg total) by mouth once daily.    LANCETS 32 GAUGE MISC    1 lancet by Misc.(Non-Drug; Combo Route) route 2 (two) times daily.    LATANOPROST 0.005 % OPHTHALMIC SOLUTION    INSTILL 1 DROP INTO BOTH EYES EVERY EVENING    MAGNESIUM OXIDE (MAG-OX) 400 MG (241.3 MG MAGNESIUM) TABLET    Take 1 tablet (400 mg total) by mouth once daily.    METFORMIN (GLUCOPHAGE-XR) 500 MG XR 24HR TABLET    Take 2 tablets (1,000 mg total) by mouth 2 (two) times daily with meals.    METOPROLOL SUCCINATE (TOPROL-XL) 50 MG 24 HR TABLET    Take 1 tablet (50 mg total) by mouth once daily.    NITROGLYCERIN (NITROSTAT) 0.3 MG SL TABLET    PLACE 1 TABLET UNDER THE TONGUE EVERY 5 MINUTES AS NEEDED FOR CHEST PAIN, NO MORE THAN 3 TABLETS IN ONE DAY    OZEMPIC 1 MG/DOSE (2 MG/1.5 ML) PNIJ    Inject 0.75 mLs into the skin once a week.    PANTOPRAZOLE (PROTONIX)  "40 MG TABLET    Take 1 tablet (40 mg total) by mouth once daily.    PEN NEEDLE, DIABETIC (BD ULTRA-FINE SHORT PEN NEEDLE) 31 GAUGE X 5/16" NDLE    Inject 1 each into the skin 3 (three) times daily.    PREDNISONE (DELTASONE) 5 MG TABLET    Take 1 tablet (5 mg total) by mouth 2 (two) times daily.    SERTRALINE (ZOLOFT) 100 MG TABLET    Take 1 tablet (100 mg total) by mouth every evening.    SILVER SULFADIAZINE 1% (SILVADENE) 1 % CREAM    Apply topically 2 (two) times daily.    SULFAMETHOXAZOLE-TRIMETHOPRIM 800-160MG (BACTRIM DS) 800-160 MG TAB    Take 1 tablet by mouth 2 (two) times daily. for 10 days    TAMSULOSIN (FLOMAX) 0.4 MG CAP    Take 1 capsule (0.4 mg total) by mouth once daily.    TIOTROPIUM (SPIRIVA WITH HANDIHALER) 18 MCG INHALATION CAPSULE    Inhale 1 capsule (18 mcg total) into the lungs once daily. Controller    TRAZODONE (DESYREL) 100 MG TABLET    Take 2 tablets (200 mg total) by mouth every evening.    VIOS AEROSOL DELIVERY SYSTEM VIN    USE AS DIRESTED   Changed and/or Refilled Medications    Modified Medication Previous Medication    HYDROCODONE-ACETAMINOPHEN (NORCO)  MG PER TABLET HYDROcodone-acetaminophen (NORCO)  mg per tablet       Take 1 tablet by mouth every 8 (eight) hours as needed for Pain.    Take 1 tablet by mouth every 8 (eight) hours as needed for Pain.       Review of patient's allergies indicates:   Allergen Reactions    Protamine Hives     Urticaria, possible upper airway swelling       Past Surgical History:   Procedure Laterality Date    ABLATION OF ARRHYTHMOGENIC FOCUS FOR ATRIAL FIBRILLATION N/A 2/27/2020    Procedure: Ablation atrial fibrillation;  Surgeon: Gio Brown MD;  Location: [facility];  Service: Cardiology;  Laterality: N/A;  afib, DAMIEN (cx if SR), PVI, PITO, anes, MB, 3 Prep    CHOLECYSTECTOMY      CLOSURE OF WOUND Left 7/1/2020    Procedure: CLOSURE, WOUND;  Surgeon: Barb Veras DPM;  Location: [facility] OR;  Service: Podiatry;  Laterality: " Left;    SELECTIVE INJECTION OF ANESTHETIC AGENT AROUND LUMBAR SPINAL NERVE ROOT BY TRANSFORAMINAL APPROACH Left 6/11/2020    Procedure: BLOCK, SPINAL NERVE ROOT, LUMBAR, SELECTIVE, TRANSFORAMINAL APPROACH Left L4-5, L5-S1 TFESI with RN IV sedation;  Surgeon: Dillan Tsai MD;  [facility];  Service: Pain Management;  Laterality: Left;    TOE AMPUTATION Left 6/29/2020    Procedure: AMPUTATION, TOE;  Surgeon: Barb Veras DPM;  Location: [facility] OR;  Service: Podiatry;  Laterality: Left;  great toe       Family History   Problem Relation Age of Onset    Glaucoma Mother     Cataracts Mother     Cataracts Father     Glaucoma Sister     Cataracts Sister     Glaucoma Maternal Aunt     No Known Problems Brother     No Known Problems Daughter     No Known Problems Son     Prostate cancer Neg Hx        Social History     Socioeconomic History    Marital status: Legally      Spouse name: Not on file    Number of children: 5    Years of education: Not on file    Highest education level: Not on file   Occupational History    Occupation: disabled   Social Needs    Financial resource strain: Not on file    Food insecurity     Worry: Not on file     Inability: Not on file    Transportation needs     Medical: Yes     Non-medical: Not on file   Tobacco Use    Smoking status: Never Smoker    Smokeless tobacco: Never Used   Substance and Sexual Activity    Alcohol use: Not Currently    Drug use: No    Sexual activity: Yes     Partners: Female   Lifestyle    Physical activity     Days per week: Not on file     Minutes per session: Not on file    Stress: Rather much   Relationships    Social connections     Talks on phone: Not on file     Gets together: Not on file     Attends [employer] service: Not on file     Active member of club or organization: Not on file     Attends meetings of clubs or organizations: Not on file     Relationship status: Not on file   Other Topics Concern    Not  "on file   Social History Narrative    Utilizing [employer] transportation which has been unreliable.        Vitals:    09/16/20 1511   BP: 130/81   Pulse: 81   Weight: 86.2 kg (190 lb)   Height: 5' 5" (1.651 m)   PainSc:   4   PainLoc: Foot       Review of Systems   Constitutional: Negative for chills and fever.   Respiratory: Negative for shortness of breath.    Cardiovascular: Negative for chest pain, palpitations, orthopnea, claudication and leg swelling.   Gastrointestinal: Negative for diarrhea, nausea and vomiting.   Musculoskeletal: Negative for joint pain.   Skin: Negative for rash.   Neurological: Positive for tingling and sensory change.   Psychiatric/Behavioral: Negative.              Objective:      Physical examination: General: Patient is in no acute distress, alert and oriented x 3.  Dressing to left foot clean, dry, and intact.   Patient presents ambulating in in surgical shoe with walker.    Lower Extremity Exam:  Vascular: Dorsalis pedis and Posterior tibial pulses palpable on left foot.  Capillary fill time <3 sec to toes on left foot. Mild edema noted on left foot.  (--) lymphangitis or (--) cellulitis noted bilateral.  DERMATOLOGICAL: (+) decreased edema, (--) erythema, (--) malodor, (+) serous drainage, (--) warmth to left foot. Open amputation wound dehisce with granular base, [id number].  Ulcer noted to left medial 1st metatarsal head  with granular/fibrotic base and with a 1 mm yellow/white border and hyperkeratosis surrounding ulcer. Ulcer measurement  1cm x 1cm x 0.3cm.  The ulcer does not extend into deeper tissue and (--) sinus tracts exist.  The dorsum surface of the feet are soft and supple.  The plantar aspects of feet are dry and scaly. Webspaces 2,3,4 clean, dry and without evidence of break in skin integrity left.   NEUROLOGICAL: Light touch, sharp-dull, proprioception all present and equal bilaterally. Protective sensation absent sites as tested with a [place]ie 5.07 " monofilament.   MUSCULOSKELETAL: Muscle strength is 5/5 for foot inverters, everters, plantarflexors, and dorsiflexors. Muscle tone is normal. Left hallux amputation noted.                 Assessment:       Encounter Diagnoses   Name Primary?   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  Dispense: 20 tablet; Refill: 0    Ulcer of left foot, with fat layer exposed    Dehiscence of amputation stump - Left Foot  -     HYDROcodone-acetaminophen (NORCO)  mg per tablet; Take 1 tablet by mouth every 8 (eight) hours as needed for Pain.  Dispense: 20 tablet; Refill: 0    Acute post-operative pain - Left Foot  -     HYDROcodone-acetaminophen (NORCO)  mg per tablet; Take 1 tablet by mouth every 8 (eight) hours as needed for Pain.  Dispense: 20 tablet; Refill: 0      I counseled the patient on his conditions, regarding findings of my examination, my impressions, and usual treatment plan.   Left medial 1st metatarsal ulcer sharply excisionally debrided of viable and nonviable tissue from the wound periphery and base. I redefined the wound borders in the process. Debridement was carried into and did include the subcutaneous layer down to good bleeding granular tissue base utilizing sterile #15 blade and tissue nipper and forceps. Wound was irrigated with sterile saline and bleeding was controlled with direct pressure. Minimal blood loss.  Post debridement measurements are contained in the above progress note. The patient tolerated this well. Wound was then dressed with Sarah and dressed with gauze, kerlix, and ACE.   Patient instructed to keep dressing dry, clean and  intact until home health comes out.   Home health to change dressing on Fridays and Mondays. Subsequent home health orders completed.   Patient instructed to continue to ambulate in surgical shoe and walker.   Patient to contineu Bactrim DS and Doxycycline 100mg to both be taken twice daily.   Refill completed for Norco 10-325mg to be taken as needed for pain. Patient advised on the possible elevation of blood pressure or heart effects and caution to take pills as needed and to discontinue use if symptoms arise, patient agreed.   Patient should call the clinic immediately if any signs of infection such as fever chills sweats increased redness or pain.  Patient to return in 1 week or sooner if needed.                   Barb Veras DPM  [facility]

## 2020-09-17 ENCOUNTER — EXTERNAL HOME HEALTH (OUTPATIENT)
Dept: HOME HEALTH SERVICES | Facility: HOSPITAL | Age: 65
End: 2020-09-17
Payer: MEDICARE

## 2020-09-21 ENCOUNTER — DOCUMENT SCAN (OUTPATIENT)
Dept: HOME HEALTH SERVICES | Facility: HOSPITAL | Age: 65
End: 2020-09-21
Payer: MEDICARE

## 2020-09-23 ENCOUNTER — OFFICE VISIT (OUTPATIENT)
Dept: PODIATRY | Facility: CLINIC | Age: 65
End: 2020-09-23
Payer: MEDICARE

## 2020-09-23 VITALS
SYSTOLIC BLOOD PRESSURE: 112 MMHG | HEIGHT: 65 IN | HEART RATE: 101 BPM | WEIGHT: 190 LBS | BODY MASS INDEX: 31.65 KG/M2 | DIASTOLIC BLOOD PRESSURE: 71 MMHG

## 2020-09-23 DIAGNOSIS — Z89.412 STATUS POST AMPUTATION OF LEFT GREAT TOE: ICD-10-CM

## 2020-09-23 DIAGNOSIS — T87.81 DEHISCENCE OF AMPUTATION STUMP: ICD-10-CM

## 2020-09-23 DIAGNOSIS — G89.18 ACUTE POST-OPERATIVE PAIN: ICD-10-CM

## 2020-09-23 DIAGNOSIS — L97.522 ULCER OF LEFT FOOT, WITH FAT LAYER EXPOSED: Primary | ICD-10-CM

## 2020-09-23 PROCEDURE — 99024 PR POST-OP FOLLOW-UP VISIT: ICD-10-PCS | Mod: HCNC,S$GLB,, | Performed by: PODIATRIST

## 2020-09-23 PROCEDURE — 99999 PR PBB SHADOW E&M-EST. PATIENT-LVL V: ICD-10-PCS | Mod: PBBFAC,HCNC,, | Performed by: PODIATRIST

## 2020-09-23 PROCEDURE — 99999 PR PBB SHADOW E&M-EST. PATIENT-LVL V: CPT | Mod: PBBFAC,HCNC,, | Performed by: PODIATRIST

## 2020-09-23 PROCEDURE — 99024 POSTOP FOLLOW-UP VISIT: CPT | Mod: HCNC,S$GLB,, | Performed by: PODIATRIST

## 2020-09-23 RX ORDER — HYDROCODONE BITARTRATE AND ACETAMINOPHEN 10; 325 MG/1; MG/1
1 TABLET ORAL EVERY 8 HOURS PRN
Qty: 20 TABLET | Refills: 0 | Status: SHIPPED | OUTPATIENT
Start: 2020-09-25 | End: 2020-09-30 | Stop reason: SDUPTHER

## 2020-09-23 NOTE — PROGRESS NOTES
Subjective:     Patient ID: Crow Green is a 65 y.o. male.    Chief Complaint: Post-op Evaluation (rates pain 6/10. wears post op shoe with dressing. diabetic Pt. PCP Dr. Lambert. )    HPI: This 65 year old male returns to the clinic 11 weeks status post left hallux amputation procedure. Patient has no complaints of fever chills or sweats. Positive pain, rates pain 6/10. Patient states dressing was kept dry, clean, and intact. Patient states home health has been coming out.  Patient has no other pedal complaints today.      Patient Active Problem List   Diagnosis    ED (erectile dysfunction)    Non-proliferative diabetic retinopathy, mild, both eyes    Hypertension associated with diabetes    Diabetic polyneuropathy    Nonobstructive coronary artery disease of native artery of native heart    Chronic combined systolic and diastolic HFrEF of 45%    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, with long-term current use of insulin    SANDRITA on CPAP    Moderate asthma    Psychophysiological insomnia    Nightmares    Sleep related gastroesophageal reflux disease    Inadequate sleep hygiene    PAF (paroxysmal atrial fibrillation)    At risk for medication noncompliance    Obesity (BMI 30.0-34.9)    Indeterminate stage chronic open angle glaucoma    Right hip pain    Gait instability    Chronic right shoulder pain    Arthritis    Left sided sciatica    Dyspnea on exertion    Osteoarthritis of spine with radiculopathy, lumbar region    HNP (herniated nucleus pulposus), lumbar    Gastroesophageal reflux disease without esophagitis    Chronic diastolic heart failure    Hypogonadism in male    Disorder of parathyroid gland    Hyperlipidemia associated with type 2 diabetes mellitus    JOSE MARIA (acute kidney injury)    LRTI (lower respiratory tract infection)    Physical deconditioning    Acute pain of left shoulder    Traumatic tear of  left rotator cuff    Atrial fibrillation with RVR    Other chronic pain    Left shoulder pain    Complete tear of left rotator cuff    Stage 3 chronic kidney disease    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 associated with type 2 diabetes mellitus    Lumbar radiculopathy    Diabetic ulcer of toe of left foot associated with type 2 diabetes mellitus, with necrosis of muscle    Ulcer of left foot    Elevated troponin    Hypotension due to medication    Dermatomyositis    Postprocedural hypotension    Follow up    Weakness generalized    Advanced directives, counseling/discussion    Cocaine dependence, in remission    Schizoaffective disorder, depressive type    Chronic obstructive pulmonary disease       Medication List with Changes/Refills   Current Medications    ALCOHOL SWABS (ALCOHOL PREP PADS) PADM    Apply 1 each topically once daily.    ALLOPURINOL (ZYLOPRIM) 100 MG TABLET    Take 1 tablet (100 mg total) by mouth once daily.    ASPIRIN (ECOTRIN) 81 MG EC TABLET    Take 1 tablet (81 mg total) by mouth once daily.    ATORVASTATIN (LIPITOR) 40 MG TABLET    TAKE 1 TABLET EVERY EVENING    AZATHIOPRINE (IMURAN) 50 MG TAB    Take 1 tablet (50 mg total) by mouth once daily.    BACLOFEN (LIORESAL) 10 MG TABLET    Take 1 tablet (10 mg total) by mouth once daily.    BLOOD SUGAR DIAGNOSTIC (TRUE METRIX GLUCOSE TEST STRIP) STRP    Use with blood glucose meter kit to test blood sugar four times  daily    BLOOD-GLUCOSE METER (TRUE METRIX AIR GLUCOSE METER) KIT    TEST FOUR TIMES DAILY BEFORE MEALS  AND EVERY NIGHT    COLCRYS 0.6 MG TABLET    TAKE 1 TABLET EVERY DAY    DICLOFENAC SODIUM (VOLTAREN) 1 % GEL    Apply 2 g topically 4 (four) times daily.    DOXYCYCLINE (VIBRAMYCIN) 100 MG CAP    Take 1 capsule (100 mg total) by mouth 2 (two) times daily.    ELIQUIS 5 MG TAB    TAKE 1 TABLET TWICE DAILY    FLUTICASONE-SALMETEROL DISKUS INHALER 250-50 MCG    Inhale 1 puff  into the lungs 2 (two) times daily. Controller    FOOD SUPPLEMT, LACTOSE-REDUCED (ENSURE) LIQD    Take 118 mLs by mouth 3 (three) times daily with meals.    FUROSEMIDE (LASIX) 40 MG TABLET    TAKE 2 TABLETS EVERY MORNING  AND TAKE 1 TABLET EVERY EVENING    GABAPENTIN (NEURONTIN) 800 MG TABLET    Take 1 tablet (800 mg total) by mouth 3 (three) times daily.    GLIMEPIRIDE (AMARYL) 2 MG TABLET    TAKE 1 TABLET (2 MG TOTAL) BY MOUTH BEFORE BREAKFAST.    HYDROCODONE-ACETAMINOPHEN (NORCO)  MG PER TABLET    Take 1 tablet by mouth every 8 (eight) hours as needed for Pain.    INHALATION SPACING DEVICE    Use as directed for inhalation.    INSULIN (LANTUS SOLOSTAR U-100 INSULIN) GLARGINE 100 UNITS/ML (3ML) SUBQ PEN    INJECT 35 UNITS SUBCUTANEOUSLY EVERY EVENING    INSULIN ASPART U-100 (NOVOLOG FLEXPEN U-100 INSULIN) 100 UNIT/ML (3 ML) INPN PEN    INJECT 10 UNITS SUBCUTANEOUSLY THREE TIMES DAILY WITH MEALS    INSULIN SYRINGE-NEEDLE U-100 1 ML 31 GAUGE X 5/16 SYRG        IPRATROPIUM (ATROVENT) 0.02 % NEBULIZER SOLUTION    USE 1 VIAL VIA NEBULIZER FOUR TIMES DAILY    ISOSORBIDE MONONITRATE (IMDUR) 60 MG 24 HR TABLET    Take 1 tablet (60 mg total) by mouth once daily.    LANCETS 32 GAUGE MISC    1 lancet by Misc.(Non-Drug; Combo Route) route 2 (two) times daily.    LATANOPROST 0.005 % OPHTHALMIC SOLUTION    INSTILL 1 DROP INTO BOTH EYES EVERY EVENING    MAGNESIUM OXIDE (MAG-OX) 400 MG (241.3 MG MAGNESIUM) TABLET    Take 1 tablet (400 mg total) by mouth once daily.    METFORMIN (GLUCOPHAGE-XR) 500 MG XR 24HR TABLET    Take 2 tablets (1,000 mg total) by mouth 2 (two) times daily with meals.    METOPROLOL SUCCINATE (TOPROL-XL) 50 MG 24 HR TABLET    Take 1 tablet (50 mg total) by mouth once daily.    NITROGLYCERIN (NITROSTAT) 0.3 MG SL TABLET    PLACE 1 TABLET UNDER THE TONGUE EVERY 5 MINUTES AS NEEDED FOR CHEST PAIN, NO MORE THAN 3 TABLETS IN ONE DAY    OZEMPIC 1 MG/DOSE (2 MG/1.5 ML) PNIJ    Inject 0.75 mLs into the skin  "once a week.    PANTOPRAZOLE (PROTONIX) 40 MG TABLET    Take 1 tablet (40 mg total) by mouth once daily.    PEN NEEDLE, DIABETIC (BD ULTRA-FINE SHORT PEN NEEDLE) 31 GAUGE X 5/16" NDLE    Inject 1 each into the skin 3 (three) times daily.    PREDNISONE (DELTASONE) 5 MG TABLET    Take 1 tablet (5 mg total) by mouth 2 (two) times daily.    SERTRALINE (ZOLOFT) 100 MG TABLET    Take 1 tablet (100 mg total) by mouth every evening.    SILVER SULFADIAZINE 1% (SILVADENE) 1 % CREAM    Apply topically 2 (two) times daily.    TAMSULOSIN (FLOMAX) 0.4 MG CAP    Take 1 capsule (0.4 mg total) by mouth once daily.    TIOTROPIUM (SPIRIVA WITH HANDIHALER) 18 MCG INHALATION CAPSULE    Inhale 1 capsule (18 mcg total) into the lungs once daily. Controller    TRAZODONE (DESYREL) 100 MG TABLET    Take 2 tablets (200 mg total) by mouth every evening.    VIOS AEROSOL DELIVERY SYSTEM VIN    USE AS DIRESTED       Review of patient's allergies indicates:   Allergen Reactions    Protamine Hives     Urticaria, possible upper airway swelling       Past Surgical History:   Procedure Laterality Date    ABLATION OF ARRHYTHMOGENIC FOCUS FOR ATRIAL FIBRILLATION N/A 2/27/2020    Procedure: Ablation atrial fibrillation;  Surgeon: Gio Brown MD;  Location: [facility] EP LAB;  Service: Cardiology;  Laterality: N/A;  afib, DAMIEN (cx if SR), PVI, PITO, anes, MB, 3 Prep    CHOLECYSTECTOMY      CLOSURE OF WOUND Left 7/1/2020    Procedure: CLOSURE, WOUND;  Surgeon: Barb Veras DPM;  Location: [facility] OR;  Service: Podiatry;  Laterality: Left;    SELECTIVE INJECTION OF ANESTHETIC AGENT AROUND LUMBAR SPINAL NERVE ROOT BY TRANSFORAMINAL APPROACH Left 6/11/2020    Procedure: BLOCK, SPINAL NERVE ROOT, LUMBAR, SELECTIVE, TRANSFORAMINAL APPROACH Left L4-5, L5-S1 TFESI with RN IV sedation;  Surgeon: Dillan Tsai MD;  Location: [facility];  Service: Pain Management;  Laterality: Left;    TOE AMPUTATION Left 6/29/2020    Procedure: AMPUTATION, TOE;  Surgeon: " "Barb Veras DPM;  Location: [facility] OR;  Service: Podiatry;  Laterality: Left;  great toe       Family History   Problem Relation Age of Onset    Glaucoma Mother     Cataracts Mother     Cataracts Father     Glaucoma Sister     Cataracts Sister     Glaucoma Maternal Aunt     No Known Problems Brother     No Known Problems Daughter     No Known Problems Son     Prostate cancer Neg Hx        Social History     Socioeconomic History    Marital status: Legally      Spouse name: Not on file    Number of children: 5    Years of education: Not on file    Highest education level: Not on file   Occupational History    Occupation: disabled   Social Needs    Financial resource strain: Not on file    Food insecurity     Worry: Not on file     Inability: Not on file    Transportation needs     Medical: Yes     Non-medical: Not on file   Tobacco Use    Smoking status: Never Smoker    Smokeless tobacco: Never Used   Substance and Sexual Activity    Alcohol use: Not Currently    Drug use: No    Sexual activity: Yes     Partners: Female   Lifestyle    Physical activity     Days per week: Not on file     Minutes per session: Not on file    Stress: Rather much   Relationships    Social connections     Talks on phone: Not on file     Gets together: Not on file     Attends Anabaptist service: Not on file     Active member of club or organization: Not on file     Attends meetings of clubs or organizations: Not on file     Relationship status: Not on file   Other Topics Concern    Not on file   Social History Narrative    Utilizing [employer] transportation which has been unreliable.        Vitals:    09/23/20 1228   BP: 112/71   Pulse: 101   Weight: 86.2 kg (190 lb)   Height: 5' 5" (1.651 m)   PainSc:   6   PainLoc: Foot       Review of Systems   Constitutional: Negative for chills and fever.   Respiratory: Negative for shortness of breath.    Cardiovascular: Negative for chest pain, palpitations, " orthopnea, claudication and leg swelling.   Gastrointestinal: Negative for diarrhea, nausea and vomiting.   Musculoskeletal: Negative for joint pain.   Skin: Negative for rash.   Neurological: Positive for tingling and sensory change.   Psychiatric/Behavioral: Negative.              Objective:      Physical examination: General: Patient is in no acute distress, alert and oriented x 3.  Dressing to left foot clean, dry, and intact.   Patient presents ambulating in in surgical shoe with walker.    Lower Extremity Exam:  Vascular: Dorsalis pedis and Posterior tibial pulses palpable on left foot.  Capillary fill time <3 sec to toes on left foot. Mild edema noted on left foot.  (--) lymphangitis or (--) cellulitis noted left.  DERMATOLOGICAL: (--) edema, (--) erythema, (--) malodor, (+) serous drainage, (--) warmth to left foot. Open amputation wound dehisce with granular base, 1cmx0.8cmx0.2cm.  Ulcer noted to left medial 1st metatarsal head  with granular/fibrotic base and with a 1 mm yellow/white border and hyperkeratosis surrounding ulcer. Ulcer measurement  1cm x 0.5cm x 0.2cm.  The ulcer does not extend into deeper tissue and (--) sinus tracts exist.  The dorsum surface of the feet are soft and supple.  The plantar aspects of feet are dry and scaly. Webspaces 2,3,4 clean, dry and without evidence of break in skin integrity left.   NEUROLOGICAL: Light touch, sharp-dull, proprioception all present and equal bilaterally. Protective sensation absent sites as tested with a [place]-Kimmie 5.07 monofilament.   MUSCULOSKELETAL: Muscle strength is 5/5 for foot inverters, everters, plantarflexors, and dorsiflexors. Muscle tone is normal. Left hallux amputation noted.                 Assessment:       Encounter Diagnoses   Name Primary?   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   Left medial 1st metatarsal ulcer sharply excisionally debrided of viable and nonviable tissue from the wound periphery and base. I redefined the wound borders in the process. Debridement was carried into and did include the subcutaneous layer down to good bleeding granular tissue base utilizing sterile #15 blade and tissue nipper and forceps. Wound was irrigated with sterile saline and bleeding was controlled with direct pressure. Minimal blood loss.  Post debridement measurements are contained in the above progress note. The patient tolerated this well. Wound was then dressed with Sarah and dressed with gauze, kerlix, and ACE.   Patient instructed to keep dressing dry, clean and intact until home health comes out.   Home health to change dressing on Fridays and Mondays. Subsequent home health orders completed.   Patient instructed to continue to ambulate in surgical shoe and walker.   Refill completed for Norco 10-325mg to be taken as needed for pain. Patient advised on the possible elevation of blood pressure or heart effects and caution to take pills as needed and to discontinue use if symptoms arise, patient agreed.   Patient should call the clinic immediately if any signs of infection such as fever chills sweats increased redness or pain.  Patient to return in 1 week or sooner if needed.               Barb Veras DPM  [facility]

## 2020-09-24 ENCOUNTER — DOCUMENT SCAN (OUTPATIENT)
Dept: HOME HEALTH SERVICES | Facility: HOSPITAL | Age: 65
End: 2020-09-24
Payer: MEDICARE

## 2020-09-28 ENCOUNTER — OUTPATIENT CASE MANAGEMENT (OUTPATIENT)
Dept: ADMINISTRATIVE | Facility: OTHER | Age: 65
End: 2020-09-28

## 2020-09-28 NOTE — PROGRESS NOTES
Outpatient Care Management   - Care Plan Follow Up    Patient: Crow Green  MRN:  [id number]  Date of Service:  9/28/2020  Completed by:  Amanda Whipple LCSW  Referral Date: 06/29/2020  Program: Case Management (High Risk)    Reason for Visit   Patient presents with    Update Plan Of Care     9/28/20       Brief Summary: Phoned patient. Patient received call that HH nurse would be at his home in 15 minutes at the beginning of the call. Patient reviewed that he has HH on M/F and weekly MD visits for wound care. IOP is not an option for him due to medical appointments, mobility. Patient reports doing well now.   Patient reported that he has not received yakov application. Patient agreed to call if he receives the application in the mail. Patient agreed to follow up with  next week.     Complex Care Plan     Care plan was discussed and completed today with input from patient and/or caregiver.    Patient Instructions     Instructions were provided via the [facility] assistant patient resources and are available for the patient to view on the patient portal.    Follow up in about 8 days (around 10/6/2020) for Follow up on mental health and [facility]..    Todays OPCM Self-Management Care Plan was developed with the patients/caregivers input and was based on identified barriers from todays assessment.  Goals were written today with the patient/caregiver and the patient has agreed to work towards these goals to improve his/her overall well-being. Patient verbalized understanding of the care plan, goals, and all of today's instructions. Encouraged patient/caregiver to communicate with his/her physician and health care team about health conditions and the treatment plan.  Provided my contact information today and encouraged patient/caregiver to call me with any questions as needed.

## 2020-09-29 ENCOUNTER — PATIENT MESSAGE (OUTPATIENT)
Dept: OTHER | Facility: OTHER | Age: 65
End: 2020-09-29

## 2020-09-30 ENCOUNTER — OFFICE VISIT (OUTPATIENT)
Dept: PODIATRY | Facility: CLINIC | Age: 65
End: 2020-09-30
Payer: MEDICARE

## 2020-09-30 ENCOUNTER — HOSPITAL ENCOUNTER (OUTPATIENT)
Dept: RADIOLOGY | Facility: HOSPITAL | Age: 65
Discharge: HOME | End: 2020-09-30
Attending: PODIATRIST
Payer: MEDICARE

## 2020-09-30 VITALS
HEART RATE: 80 BPM | HEIGHT: 65 IN | DIASTOLIC BLOOD PRESSURE: 74 MMHG | BODY MASS INDEX: 31.65 KG/M2 | WEIGHT: 190 LBS | SYSTOLIC BLOOD PRESSURE: 121 MMHG

## 2020-09-30 DIAGNOSIS — T87.81 DEHISCENCE OF AMPUTATION STUMP: ICD-10-CM

## 2020-09-30 DIAGNOSIS — L97.522 ULCER OF LEFT FOOT, WITH FAT LAYER EXPOSED: ICD-10-CM

## 2020-09-30 DIAGNOSIS — Z89.412 STATUS POST AMPUTATION OF LEFT GREAT TOE: Primary | ICD-10-CM

## 2020-09-30 DIAGNOSIS — Z89.412 STATUS POST AMPUTATION OF LEFT GREAT TOE: ICD-10-CM

## 2020-09-30 DIAGNOSIS — G89.18 ACUTE POST-OPERATIVE PAIN: ICD-10-CM

## 2020-09-30 PROCEDURE — 99999 PR PBB SHADOW E&M-EST. PATIENT-LVL V: CPT | Mod: PBBFAC,HCNC,, | Performed by: PODIATRIST

## 2020-09-30 PROCEDURE — 73630 X-RAY EXAM OF FOOT: CPT | Mod: 26,HCNC,LT, | Performed by: RADIOLOGY

## 2020-09-30 PROCEDURE — 73630 XR FOOT COMPLETE 3 VIEW LEFT: ICD-10-PCS | Mod: 26,HCNC,LT, | Performed by: RADIOLOGY

## 2020-09-30 PROCEDURE — 99999 PR PBB SHADOW E&M-EST. PATIENT-LVL V: ICD-10-PCS | Mod: PBBFAC,HCNC,, | Performed by: PODIATRIST

## 2020-09-30 PROCEDURE — 99024 PR POST-OP FOLLOW-UP VISIT: ICD-10-PCS | Mod: HCNC,S$GLB,, | Performed by: PODIATRIST

## 2020-09-30 PROCEDURE — 99024 POSTOP FOLLOW-UP VISIT: CPT | Mod: HCNC,S$GLB,, | Performed by: PODIATRIST

## 2020-09-30 PROCEDURE — 73630 X-RAY EXAM OF FOOT: CPT | Mod: TC,HCNC,LT

## 2020-09-30 RX ORDER — HYDROCODONE BITARTRATE AND ACETAMINOPHEN 10; 325 MG/1; MG/1
1 TABLET ORAL EVERY 8 HOURS PRN
Qty: 20 TABLET | Refills: 0 | Status: SHIPPED | OUTPATIENT
Start: 2020-10-02 | End: 2020-10-07 | Stop reason: SDUPTHER

## 2020-09-30 NOTE — PROGRESS NOTES
Subjective:     Patient ID: Crow Green is a 65 y.o. male.    Chief Complaint: Post-op Evaluation (c/o pain to left foot. rates pain 7/10. wears post op shoe with sock and dressing. diabetic Pt. PCP Dr. Lambert.)    HPI: This 65 year old male returns to the clinic 3 months status post left hallux amputation procedure. Patient has no complaints of fever chills or sweats. Positive pain, rates pain 7/10. Patient states dressing was kept dry, clean, and intact. Patient states home health has been coming out.  Patient has no other pedal complaints today.      Patient Active Problem List   Diagnosis    ED (erectile dysfunction)    Non-proliferative diabetic retinopathy, mild, both eyes    Hypertension associated with diabetes    Diabetic polyneuropathy    Nonobstructive coronary artery disease of native artery of native heart    Chronic combined systolic and diastolic HFrEF of 45%    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, with long-term current use of insulin    SANDRITA on CPAP    Moderate asthma    Psychophysiological insomnia    Nightmares    Sleep related gastroesophageal reflux disease    Inadequate sleep hygiene    PAF (paroxysmal atrial fibrillation)    At risk for medication noncompliance    Obesity (BMI 30.0-34.9)    Indeterminate stage chronic open angle glaucoma    Right hip pain    Gait instability    Chronic right shoulder pain    Arthritis    Left sided sciatica    Dyspnea on exertion    Osteoarthritis of spine with radiculopathy, lumbar region    HNP (herniated nucleus pulposus), lumbar    Gastroesophageal reflux disease without esophagitis    Chronic diastolic heart failure    Hypogonadism in male    Disorder of parathyroid gland    Hyperlipidemia associated with type 2 diabetes mellitus    JOSE MARIA (acute kidney injury)    LRTI (lower respiratory tract infection)    Physical deconditioning    Acute pain of left  shoulder    Traumatic tear of left rotator cuff    Atrial fibrillation with RVR    Other chronic pain    Left shoulder pain    Complete tear of left rotator cuff    Stage 3 chronic kidney disease    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 associated with type 2 diabetes mellitus    Lumbar radiculopathy    Diabetic ulcer of toe of left foot associated with type 2 diabetes mellitus, with necrosis of muscle    Ulcer of left foot    Elevated troponin    Hypotension due to medication    Dermatomyositis    Postprocedural hypotension    Follow up    Weakness generalized    Advanced directives, counseling/discussion    Cocaine dependence, in remission    Schizoaffective disorder, depressive type    Chronic obstructive pulmonary disease       Medication List with Changes/Refills   Current Medications    ALCOHOL SWABS (ALCOHOL PREP PADS) PADM    Apply 1 each topically once daily.    ALLOPURINOL (ZYLOPRIM) 100 MG TABLET    Take 1 tablet (100 mg total) by mouth once daily.    ASPIRIN (ECOTRIN) 81 MG EC TABLET    Take 1 tablet (81 mg total) by mouth once daily.    ATORVASTATIN (LIPITOR) 40 MG TABLET    TAKE 1 TABLET EVERY EVENING    AZATHIOPRINE (IMURAN) 50 MG TAB    Take 1 tablet (50 mg total) by mouth once daily.    BACLOFEN (LIORESAL) 10 MG TABLET    Take 1 tablet (10 mg total) by mouth once daily.    BLOOD SUGAR DIAGNOSTIC (TRUE METRIX GLUCOSE TEST STRIP) STRP    Use with blood glucose meter kit to test blood sugar four times  daily    BLOOD-GLUCOSE METER (TRUE METRIX AIR GLUCOSE METER) KIT    TEST FOUR TIMES DAILY BEFORE MEALS  AND EVERY NIGHT    COLCRYS 0.6 MG TABLET    TAKE 1 TABLET EVERY DAY    DICLOFENAC SODIUM (VOLTAREN) 1 % GEL    Apply 2 g topically 4 (four) times daily.    DOXYCYCLINE (VIBRAMYCIN) 100 MG CAP    Take 1 capsule (100 mg total) by mouth 2 (two) times daily.    ELIQUIS 5 MG TAB    TAKE 1 TABLET TWICE DAILY    FLUTICASONE-SALMETEROL DISKUS INHALER  250-50 MCG    Inhale 1 puff into the lungs 2 (two) times daily. Controller    FOOD SUPPLEMT, LACTOSE-REDUCED (ENSURE) LIQD    Take 118 mLs by mouth 3 (three) times daily with meals.    FUROSEMIDE (LASIX) 40 MG TABLET    TAKE 2 TABLETS EVERY MORNING  AND TAKE 1 TABLET EVERY EVENING    GABAPENTIN (NEURONTIN) 800 MG TABLET    Take 1 tablet (800 mg total) by mouth 3 (three) times daily.    GLIMEPIRIDE (AMARYL) 2 MG TABLET    TAKE 1 TABLET (2 MG TOTAL) BY MOUTH BEFORE BREAKFAST.    INHALATION SPACING DEVICE    Use as directed for inhalation.    INSULIN (LANTUS SOLOSTAR U-100 INSULIN) GLARGINE 100 UNITS/ML (3ML) SUBQ PEN    INJECT 35 UNITS SUBCUTANEOUSLY EVERY EVENING    INSULIN ASPART U-100 (NOVOLOG FLEXPEN U-100 INSULIN) 100 UNIT/ML (3 ML) INPN PEN    INJECT 10 UNITS SUBCUTANEOUSLY THREE TIMES DAILY WITH MEALS    INSULIN SYRINGE-NEEDLE U-100 1 ML 31 GAUGE X 5/16 SYRG        IPRATROPIUM (ATROVENT) 0.02 % NEBULIZER SOLUTION    USE 1 VIAL VIA NEBULIZER FOUR TIMES DAILY    ISOSORBIDE MONONITRATE (IMDUR) 60 MG 24 HR TABLET    Take 1 tablet (60 mg total) by mouth once daily.    LANCETS 32 GAUGE MISC    1 lancet by Misc.(Non-Drug; Combo Route) route 2 (two) times daily.    LATANOPROST 0.005 % OPHTHALMIC SOLUTION    INSTILL 1 DROP INTO BOTH EYES EVERY EVENING    MAGNESIUM OXIDE (MAG-OX) 400 MG (241.3 MG MAGNESIUM) TABLET    Take 1 tablet (400 mg total) by mouth once daily.    METFORMIN (GLUCOPHAGE-XR) 500 MG XR 24HR TABLET    Take 2 tablets (1,000 mg total) by mouth 2 (two) times daily with meals.    METOPROLOL SUCCINATE (TOPROL-XL) 50 MG 24 HR TABLET    Take 1 tablet (50 mg total) by mouth once daily.    NITROGLYCERIN (NITROSTAT) 0.3 MG SL TABLET    PLACE 1 TABLET UNDER THE TONGUE EVERY 5 MINUTES AS NEEDED FOR CHEST PAIN, NO MORE THAN 3 TABLETS IN ONE DAY    OZEMPIC 1 MG/DOSE (2 MG/1.5 ML) PNIJ    Inject 0.75 mLs into the skin once a week.    PANTOPRAZOLE (PROTONIX) 40 MG TABLET    Take 1 tablet (40 mg total) by mouth once  "daily.    PEN NEEDLE, DIABETIC (BD ULTRA-FINE SHORT PEN NEEDLE) 31 GAUGE X 5/16" NDLE    Inject 1 each into the skin 3 (three) times daily.    PREDNISONE (DELTASONE) 5 MG TABLET    Take 1 tablet (5 mg total) by mouth 2 (two) times daily.    SERTRALINE (ZOLOFT) 100 MG TABLET    Take 1 tablet (100 mg total) by mouth every evening.    SILVER SULFADIAZINE 1% (SILVADENE) 1 % CREAM    Apply topically 2 (two) times daily.    TAMSULOSIN (FLOMAX) 0.4 MG CAP    Take 1 capsule (0.4 mg total) by mouth once daily.    TIOTROPIUM (SPIRIVA WITH HANDIHALER) 18 MCG INHALATION CAPSULE    Inhale 1 capsule (18 mcg total) into the lungs once daily. Controller    TRAZODONE (DESYREL) 100 MG TABLET    Take 2 tablets (200 mg total) by mouth every evening.    VIOS AEROSOL DELIVERY SYSTEM VIN    USE AS DIRESTED   Changed and/or Refilled Medications    Modified Medication Previous Medication    HYDROCODONE-ACETAMINOPHEN (NORCO)  MG PER TABLET HYDROcodone-acetaminophen (NORCO)  mg per tablet       Take 1 tablet by mouth every 8 (eight) hours as needed for Pain.    Take 1 tablet by mouth every 8 (eight) hours as needed for Pain.       Review of patient's allergies indicates:   Allergen Reactions    Protamine Hives     Urticaria, possible upper airway swelling       Past Surgical History:   Procedure Laterality Date    ABLATION OF ARRHYTHMOGENIC FOCUS FOR ATRIAL FIBRILLATION N/A 2/27/2020    Procedure: Ablation atrial fibrillation;  Surgeon: Gio Brown MD;  Location: [facility] EP LAB;  Service: Cardiology;  Laterality: N/A;  afib, DAMIEN (cx if SR), PVI, PITO, anes, MB, 3 Prep    CHOLECYSTECTOMY      CLOSURE OF WOUND Left 7/1/2020    Procedure: CLOSURE, WOUND;  Surgeon: Barb Veras DPM;  [facility];  Service: Podiatry;  Laterality: Left;    SELECTIVE INJECTION OF ANESTHETIC AGENT AROUND LUMBAR SPINAL NERVE ROOT BY TRANSFORAMINAL APPROACH Left 6/11/2020    Procedure: BLOCK, SPINAL NERVE ROOT, LUMBAR, SELECTIVE, " TRANSFORAMINAL APPROACH Left L4-5, L5-S1 TFESI with RN IV sedation;  Surgeon: Dillan Tsai MD;  Location: [facility] PAIN MGT;  Service: Pain Management;  Laterality: Left;    TOE AMPUTATION Left 6/29/2020    Procedure: AMPUTATION, TOE;  Surgeon: Barb Veras DPM;  Location: [facility] OR;  Service: Podiatry;  Laterality: Left;  great toe       Family History   Problem Relation Age of Onset    Glaucoma Mother     Cataracts Mother     Cataracts Father     Glaucoma Sister     Cataracts Sister     Glaucoma Maternal Aunt     No Known Problems Brother     No Known Problems Daughter     No Known Problems Son     Prostate cancer Neg Hx        Social History     Socioeconomic History    Marital status: Legally      Spouse name: Not on file    Number of children: 5    Years of education: Not on file    Highest education level: Not on file   Occupational History    Occupation: disabled   Social Needs    Financial resource strain: Not on file    Food insecurity     Worry: Not on file     Inability: Not on file    Transportation needs     Medical: Yes     Non-medical: Not on file   Tobacco Use    Smoking status: Never Smoker    Smokeless tobacco: Never Used   Substance and Sexual Activity    Alcohol use: Not Currently    Drug use: No    Sexual activity: Yes     Partners: Female   Lifestyle    Physical activity     Days per week: Not on file     Minutes per session: Not on file    Stress: Rather much   Relationships    Social connections     Talks on phone: Not on file     Gets together: Not on file     Attends [employer] service: Not on file     Active member of club or organization: Not on file     Attends meetings of clubs or organizations: Not on file     Relationship status: Not on file   Other Topics Concern    Not on file   Social History Narrative    Utilizing [employer] transportation which has been unreliable.        Vitals:    09/30/20 1025   BP: 121/74   Pulse: 80   Weight: 86.2 kg (190 lb)  "  Height: 5' 5" (1.651 m)   PainSc:   7   PainLoc: Foot       Review of Systems   Constitutional: Negative for chills and fever.   Respiratory: Negative for shortness of breath.    Cardiovascular: Negative for chest pain, palpitations, orthopnea, claudication and leg swelling.   Gastrointestinal: Negative for diarrhea, nausea and vomiting.   Musculoskeletal: Negative for joint pain.   Skin: Negative for rash.   Neurological: Positive for tingling and sensory change.   Psychiatric/Behavioral: Negative.              Objective:      Physical examination: General: Patient is in no acute distress, alert and oriented x 3.  Dressing to left foot clean, dry, and intact.   Patient presents ambulating in in surgical shoe with walker.    Lower Extremity Exam:  Vascular: Dorsalis pedis and Posterior tibial pulses palpable on left foot.  Capillary fill time <3 sec to toes on left foot. Mild edema noted on left foot.  (--) lymphangitis or (--) cellulitis noted left.  DERMATOLOGICAL: (+) decreased edema, (--) erythema, (--) malodor, (+) serous drainage, (--) warmth to left foot. Open amputation wound dehisce with fibrotic base and with a 1 mm yellow/white border and hyperkeratosis surrounding ulcer. Ulcer measurement (post) 1cm x 0.5cm x 0.3cm.  The ulcer does not extend into deeper tissue and (--) sinus tracts exist.  Ulcer noted to left medial 1st metatarsal head  with granular base and with a 1 mm yellow/white border and hyperkeratosis surrounding ulcer. Ulcer measurement (post) 0.5cm x 0.3cm x 0.1cm.  The ulcer does not extend into deeper tissue and (--) sinus tracts exist. The dorsum surface of the feet are soft and supple.  The plantar aspects of feet are dry and scaly. Webspaces 2,3,4 clean, dry and without evidence of break in skin integrity left.   NEUROLOGICAL: Light touch, sharp-dull, proprioception all present and equal bilaterally. Protective sensation absent sites as tested with a Blackwell-Kimmie 5.07 monofilament. "   MUSCULOSKELETAL: Muscle strength is 5/5 for foot inverters, everters, plantarflexors, and dorsiflexors. Muscle tone is normal. Left hallux amputation noted.         Assessment:       Encounter Diagnoses   Name Primary?   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  Dispense: 20 tablet; Refill: 0    Ulcer of left foot, with fat layer exposed  -     X-Ray Foot Complete Left; Future; Expected date: 09/30/2020    Dehiscence of amputation stump - Left Foot  -     X-Ray Foot Complete Left; Future; Expected date: 09/30/2020  -     HYDROcodone-acetaminophen (NORCO)  mg per tablet; Take 1 tablet by mouth every 8 (eight) hours as needed for Pain.  Dispense: 20 tablet; Refill: 0    Acute post-operative pain - Left Foot  -     HYDROcodone-acetaminophen (NORCO)  mg per tablet; Take 1 tablet by mouth every 8 (eight) hours as needed for Pain.  Dispense: 20 tablet; Refill: 0      I counseled the patient on his conditions, regarding findings of my examination, my impressions, and usual treatment plan.   Left medial 1st metatarsal ulcer sharply excisionally debrided of viable and nonviable tissue from the wound periphery and base. I redefined the wound borders in the process. Debridement was carried into and did include the subcutaneous layer down to good bleeding granular tissue base utilizing sterile #15 blade and tissue nipper and forceps. Wound was irrigated with sterile saline and bleeding was controlled with direct pressure. Minimal blood loss.  Post debridement measurements are contained in the above  progress note. The patient tolerated this well. Wound was then dressed with Sarah and dressed with gauze, kerlix, and ACE.   Patient instructed to keep dressing dry, clean and intact until home health comes out.   Home health to change dressing on Fridays and Mondays. Subsequent home health orders completed.   Patient instructed to continue to ambulate in surgical shoe and walker.   Ordered left foot x-ray to be completed today.   Refill completed for Norco 10-325mg to be taken as needed for pain. Patient advised on the possible elevation of blood pressure or heart effects and caution to take pills as needed and to discontinue use if symptoms arise, patient agreed.   Patient should call the clinic immediately if any signs of infection such as fever chills sweats increased redness or pain.  Patient to return in 1 week or sooner if needed.               Barb Veras DPM  [facility]

## 2020-10-01 ENCOUNTER — LAB VISIT (OUTPATIENT)
Dept: LAB | Facility: HOSPITAL | Age: 65
End: 2020-10-01
Attending: PHYSICIAN ASSISTANT
Payer: MEDICARE

## 2020-10-01 ENCOUNTER — OFFICE VISIT (OUTPATIENT)
Dept: DIABETES | Facility: CLINIC | Age: 65
End: 2020-10-01
Payer: MEDICARE

## 2020-10-01 ENCOUNTER — OFFICE VISIT (OUTPATIENT)
Dept: INTERNAL MEDICINE | Facility: CLINIC | Age: 65
End: 2020-10-01
Payer: MEDICARE

## 2020-10-01 ENCOUNTER — TELEPHONE (OUTPATIENT)
Dept: PHARMACY | Facility: CLINIC | Age: 65
End: 2020-10-01

## 2020-10-01 ENCOUNTER — DOCUMENT SCAN (OUTPATIENT)
Dept: HOME HEALTH SERVICES | Facility: HOSPITAL | Age: 65
End: 2020-10-01
Payer: MEDICARE

## 2020-10-01 ENCOUNTER — TELEPHONE (OUTPATIENT)
Dept: INTERNAL MEDICINE | Facility: CLINIC | Age: 65
End: 2020-10-01

## 2020-10-01 VITALS
BODY MASS INDEX: 31.62 KG/M2 | SYSTOLIC BLOOD PRESSURE: 126 MMHG | HEIGHT: 65 IN | HEART RATE: 76 BPM | DIASTOLIC BLOOD PRESSURE: 70 MMHG

## 2020-10-01 VITALS
DIASTOLIC BLOOD PRESSURE: 82 MMHG | HEART RATE: 79 BPM | HEIGHT: 65 IN | WEIGHT: 194.69 LBS | BODY MASS INDEX: 32.44 KG/M2 | TEMPERATURE: 98 F | RESPIRATION RATE: 17 BRPM | SYSTOLIC BLOOD PRESSURE: 122 MMHG | OXYGEN SATURATION: 99 %

## 2020-10-01 DIAGNOSIS — I50.32 CHRONIC DIASTOLIC HEART FAILURE: ICD-10-CM

## 2020-10-01 DIAGNOSIS — I15.2 HYPERTENSION ASSOCIATED WITH DIABETES: ICD-10-CM

## 2020-10-01 DIAGNOSIS — E11.42 DIABETIC POLYNEUROPATHY ASSOCIATED WITH TYPE 2 DIABETES MELLITUS: ICD-10-CM

## 2020-10-01 DIAGNOSIS — E21.5 DISORDER OF PARATHYROID GLAND: ICD-10-CM

## 2020-10-01 DIAGNOSIS — E11.59 HYPERTENSION ASSOCIATED WITH DIABETES: ICD-10-CM

## 2020-10-01 DIAGNOSIS — E11.65 UNCONTROLLED TYPE 2 DIABETES MELLITUS WITH HYPERGLYCEMIA: ICD-10-CM

## 2020-10-01 DIAGNOSIS — I25.118 CORONARY ARTERY DISEASE OF NATIVE ARTERY OF NATIVE HEART WITH STABLE ANGINA PECTORIS: ICD-10-CM

## 2020-10-01 DIAGNOSIS — E11.69 HYPERLIPIDEMIA ASSOCIATED WITH TYPE 2 DIABETES MELLITUS: ICD-10-CM

## 2020-10-01 DIAGNOSIS — N18.30 STAGE 3 CHRONIC KIDNEY DISEASE, UNSPECIFIED WHETHER STAGE 3A OR 3B CKD: ICD-10-CM

## 2020-10-01 DIAGNOSIS — N52.9 ERECTILE DYSFUNCTION, UNSPECIFIED ERECTILE DYSFUNCTION TYPE: ICD-10-CM

## 2020-10-01 DIAGNOSIS — Z12.11 COLON CANCER SCREENING: Primary | ICD-10-CM

## 2020-10-01 DIAGNOSIS — E11.65 UNCONTROLLED TYPE 2 DIABETES MELLITUS WITH HYPERGLYCEMIA: Primary | ICD-10-CM

## 2020-10-01 DIAGNOSIS — I50.42 CHRONIC COMBINED SYSTOLIC AND DIASTOLIC CONGESTIVE HEART FAILURE: Chronic | ICD-10-CM

## 2020-10-01 DIAGNOSIS — E78.5 HYPERLIPIDEMIA ASSOCIATED WITH TYPE 2 DIABETES MELLITUS: ICD-10-CM

## 2020-10-01 DIAGNOSIS — E11.3312 MODERATE NONPROLIFERATIVE DIABETIC RETINOPATHY OF LEFT EYE WITH MACULAR EDEMA ASSOCIATED WITH TYPE 2 DIABETES MELLITUS: ICD-10-CM

## 2020-10-01 DIAGNOSIS — E29.1 HYPOGONADISM IN MALE: ICD-10-CM

## 2020-10-01 DIAGNOSIS — E66.9 OBESITY (BMI 30.0-34.9): ICD-10-CM

## 2020-10-01 DIAGNOSIS — G89.29 OTHER CHRONIC PAIN: ICD-10-CM

## 2020-10-01 PROCEDURE — 99999 PR PBB SHADOW E&M-EST. PATIENT-LVL V: CPT | Mod: PBBFAC,HCNC,, | Performed by: PHYSICIAN ASSISTANT

## 2020-10-01 PROCEDURE — 3078F PR MOST RECENT DIASTOLIC BLOOD PRESSURE < 80 MM HG: ICD-10-PCS | Mod: HCNC,CPTII,S$GLB, | Performed by: PHYSICIAN ASSISTANT

## 2020-10-01 PROCEDURE — 3008F PR BODY MASS INDEX (BMI) DOCUMENTED: ICD-10-PCS | Mod: HCNC,CPTII,S$GLB, | Performed by: FAMILY MEDICINE

## 2020-10-01 PROCEDURE — 99999 PR PBB SHADOW E&M-EST. PATIENT-LVL V: CPT | Mod: PBBFAC,HCNC,, | Performed by: FAMILY MEDICINE

## 2020-10-01 PROCEDURE — G0008 FLU VACCINE (QUAD) GREATER THAN OR EQUAL TO 3YO PRESERVATIVE FREE IM: ICD-10-PCS | Mod: HCNC,S$GLB,, | Performed by: FAMILY MEDICINE

## 2020-10-01 PROCEDURE — 36415 COLL VENOUS BLD VENIPUNCTURE: CPT | Mod: HCNC,PO

## 2020-10-01 PROCEDURE — 99999 PR PBB SHADOW E&M-EST. PATIENT-LVL V: ICD-10-PCS | Mod: PBBFAC,HCNC,, | Performed by: PHYSICIAN ASSISTANT

## 2020-10-01 PROCEDURE — 90686 FLU VACCINE (QUAD) GREATER THAN OR EQUAL TO 3YO PRESERVATIVE FREE IM: ICD-10-PCS | Mod: HCNC,S$GLB,, | Performed by: FAMILY MEDICINE

## 2020-10-01 PROCEDURE — 3074F PR MOST RECENT SYSTOLIC BLOOD PRESSURE < 130 MM HG: ICD-10-PCS | Mod: HCNC,CPTII,S$GLB, | Performed by: PHYSICIAN ASSISTANT

## 2020-10-01 PROCEDURE — 90686 IIV4 VACC NO PRSV 0.5 ML IM: CPT | Mod: HCNC,S$GLB,, | Performed by: FAMILY MEDICINE

## 2020-10-01 PROCEDURE — 3078F DIAST BP <80 MM HG: CPT | Mod: HCNC,CPTII,S$GLB, | Performed by: PHYSICIAN ASSISTANT

## 2020-10-01 PROCEDURE — 3079F PR MOST RECENT DIASTOLIC BLOOD PRESSURE 80-89 MM HG: ICD-10-PCS | Mod: HCNC,CPTII,S$GLB, | Performed by: FAMILY MEDICINE

## 2020-10-01 PROCEDURE — 3074F PR MOST RECENT SYSTOLIC BLOOD PRESSURE < 130 MM HG: ICD-10-PCS | Mod: HCNC,CPTII,S$GLB, | Performed by: FAMILY MEDICINE

## 2020-10-01 PROCEDURE — 3044F HG A1C LEVEL LT 7.0%: CPT | Mod: HCNC,CPTII,S$GLB, | Performed by: FAMILY MEDICINE

## 2020-10-01 PROCEDURE — 99214 PR OFFICE/OUTPT VISIT, EST, LEVL IV, 30-39 MIN: ICD-10-PCS | Mod: 25,HCNC,S$GLB, | Performed by: FAMILY MEDICINE

## 2020-10-01 PROCEDURE — 3079F DIAST BP 80-89 MM HG: CPT | Mod: HCNC,CPTII,S$GLB, | Performed by: FAMILY MEDICINE

## 2020-10-01 PROCEDURE — 3044F PR MOST RECENT HEMOGLOBIN A1C LEVEL <7.0%: ICD-10-PCS | Mod: HCNC,CPTII,S$GLB, | Performed by: FAMILY MEDICINE

## 2020-10-01 PROCEDURE — 3008F BODY MASS INDEX DOCD: CPT | Mod: HCNC,CPTII,S$GLB, | Performed by: FAMILY MEDICINE

## 2020-10-01 PROCEDURE — 99214 OFFICE O/P EST MOD 30 MIN: CPT | Mod: 25,HCNC,S$GLB, | Performed by: FAMILY MEDICINE

## 2020-10-01 PROCEDURE — 3008F PR BODY MASS INDEX (BMI) DOCUMENTED: ICD-10-PCS | Mod: HCNC,CPTII,S$GLB, | Performed by: PHYSICIAN ASSISTANT

## 2020-10-01 PROCEDURE — 3008F BODY MASS INDEX DOCD: CPT | Mod: HCNC,CPTII,S$GLB, | Performed by: PHYSICIAN ASSISTANT

## 2020-10-01 PROCEDURE — 99214 OFFICE O/P EST MOD 30 MIN: CPT | Mod: HCNC,S$GLB,, | Performed by: PHYSICIAN ASSISTANT

## 2020-10-01 PROCEDURE — 3044F PR MOST RECENT HEMOGLOBIN A1C LEVEL <7.0%: ICD-10-PCS | Mod: HCNC,CPTII,S$GLB, | Performed by: PHYSICIAN ASSISTANT

## 2020-10-01 PROCEDURE — G0008 ADMIN INFLUENZA VIRUS VAC: HCPCS | Mod: HCNC,S$GLB,, | Performed by: FAMILY MEDICINE

## 2020-10-01 PROCEDURE — 99999 PR PBB SHADOW E&M-EST. PATIENT-LVL V: ICD-10-PCS | Mod: PBBFAC,HCNC,, | Performed by: FAMILY MEDICINE

## 2020-10-01 PROCEDURE — 3074F SYST BP LT 130 MM HG: CPT | Mod: HCNC,CPTII,S$GLB, | Performed by: FAMILY MEDICINE

## 2020-10-01 PROCEDURE — 83036 HEMOGLOBIN GLYCOSYLATED A1C: CPT | Mod: HCNC

## 2020-10-01 PROCEDURE — 3074F SYST BP LT 130 MM HG: CPT | Mod: HCNC,CPTII,S$GLB, | Performed by: PHYSICIAN ASSISTANT

## 2020-10-01 PROCEDURE — 99214 PR OFFICE/OUTPT VISIT, EST, LEVL IV, 30-39 MIN: ICD-10-PCS | Mod: HCNC,S$GLB,, | Performed by: PHYSICIAN ASSISTANT

## 2020-10-01 PROCEDURE — 3044F HG A1C LEVEL LT 7.0%: CPT | Mod: HCNC,CPTII,S$GLB, | Performed by: PHYSICIAN ASSISTANT

## 2020-10-01 NOTE — PATIENT INSTRUCTIONS
CURRENT DM MEDICATIONS:    Lantus 30 units at night    Novolog 5 units 15 mins before meals up to three times daily - as below    Metformin XR 1000 mg BID    Glimepiride 2 mg with breakfast    Ozempic 0.5 mg weekly      If blood sugar is below 70 eat first then check your blood sugar 2 hours later and make correction.   If blood pre-meal sugar is  70 -150 take 5 units of Novolog;   If blood pre-meal sugar is 151-200 take 7 units of Novolog;   If blood pre-meal sugar is 201-250 take 9 units of Novolog;   If blood pre-meal sugar is 251-300 take 11 units of Novolog;   If blood pre-meal sugar is 301-350 take 13 units of Novolog;   If blood pre-meal sugar is 351-400+ take 15 units of Novolog;   Also increase water intake and call for appointment.

## 2020-10-01 NOTE — PROGRESS NOTES
PCP: Mateo Lambert DO    Subjective:     Chief Complaint: Diabetes - Established Patient    HISTORY OF PRESENT ILLNESS: 65 y.o.   male presenting for diabetes management visit.   Patient has previously been established with the Diabetes Management Department and was last seen by myself on 05/19/20 despite instruction to follow up in 6 weeks. Patient has issues with transportation.   Patient has had Type II diabetes since 2000.  Pertinent to decision making is the following comorbidities: HTN, HLD, CAD, CHF, CKD III, Obesity by BMI, ED and Parathryoid disorder and Hypogonadism   Patient has the following Diabetes complications: with diabetic chronic kidney disease, with diabetic polyneuropathy and with diabetic retinopathy; mild non-proliferative; without macular edema  He  has attended diabetes education in the past.       Patient's most recent A1c of 7.7% was completed 5 months ago.   Patient states since His last A1c His blood glucose levels have been within range throughout the day .   Patient monitors blood glucose 4 times per day with meter : Fasting, Before Lunch, Before Dinner and Before Bed. Dexcom CGM received and needs training.   Patient blood glucose monitoring device will not be uploaded into [employer] today secondary to patient forgetting device.   Per patient recall, fasting blood sugars ranging from 99 - 125, before lunch ranging 115 - 130, before dinner 115 - 130. Patient had 287 once after eating candy.   Patient endorses the following diabetes related symptoms: None.   Patient is due today for the following diabetes-related health maintenance standards: A1c.   He denies recent hospital admissions or emergency room visits.   He denies having hypoglycemia since changing insulin.   Patient's concerns today include glycemic control.   Patient medication regimen is as below.     CURRENT DM MEDICATIONS:    Lantus 40 units at night - missing doses   Novolog 20 units before  "meals - did not switch to 5 units with sliding scale TID wm as below    Metformin XR 1000 mg BID    Glimepiride 2 mg with breakfast    Ozempic 0.5 mg weekly      If blood sugar is below 70 eat first then check your blood sugar 2 hours later and make correction.   If blood pre-meal sugar is  70 -150 take 5 units of Novolog;   If blood pre-meal sugar is 151-200 take 7 units of Novolog;   If blood pre-meal sugar is 201-250 take 9 units of Novolog;   If blood pre-meal sugar is 251-300 take 11 units of Novolog;   If blood pre-meal sugar is 301-350 take 13 units of Novolog;   If blood pre-meal sugar is 351-400+ take 15 units of Novolog;   Also increase water intake and call for appointment.     Patient has failed the following Diabetes medications:    Bydureon      Labs Reviewed.       Lab Results   Component Value Date    CPEPTIDE 4.37 07/20/2017     Lab Results   Component Value Date    GLUTAMICACID 0.01 07/20/2017       Height: 5' 5" (165.1 cm)  //   , Body mass index is 31.62 kg/m².  His blood sugar in clinic today is:    101 @ 1:01 pm     Review of Systems   Constitutional: Negative for activity change, appetite change, chills and fever.   HENT: Negative for dental problem, mouth sores, nosebleeds, sore throat and trouble swallowing.    Eyes: Negative for pain and discharge.   Respiratory: Negative for shortness of breath, wheezing and stridor.    Cardiovascular: Negative for chest pain, palpitations and leg swelling.   Gastrointestinal: Negative for abdominal pain, diarrhea, nausea and vomiting.   Endocrine: Negative for polydipsia, polyphagia and polyuria.   Genitourinary: Negative for dysuria, frequency and urgency.   Musculoskeletal: Negative for joint swelling and myalgias.   Skin: Negative for rash and wound.   Neurological: Negative for dizziness, syncope, weakness and headaches.   Psychiatric/Behavioral: Negative for behavioral problems and dysphoric mood.         Diabetes Management Status  Statin: " Taking  ACE/ARB: Taking    Screening or Prevention Patient's value Goal Complete/Controlled?   HgA1C Testing and Control   Lab Results   Component Value Date    HGBA1C 7.7 (H) 05/19/2020      Annually/Less than 8% Yes   Lipid profile : 05/21/2020 Annually Yes   LDL control Lab Results   Component Value Date    LDLCALC 62.4 (L) 05/21/2020    Annually/Less than 100 mg/dl  Yes   Nephropathy screening Lab Results   Component Value Date    MICALBCREAT 16.5 06/26/2020     Lab Results   Component Value Date    PROTEINUA Trace (A) 07/05/2020    Annually No   Blood pressure BP Readings from Last 1 Encounters:   09/30/20 121/74    Less than 140/90 Yes   Dilated retinal exam : 05/28/2020 Annually Yes    Foot exam   : 06/26/2020 Annually Yes     Social History     Socioeconomic History    Marital status: Legally      Spouse name: Not on file    Number of children: 5    Years of education: Not on file    Highest education level: Not on file   Occupational History    Occupation: disabled   Social Needs    Financial resource strain: Not on file    Food insecurity     Worry: Not on file     Inability: Not on file    Transportation needs     Medical: Yes     Non-medical: Not on file   Tobacco Use    Smoking status: Never Smoker    Smokeless tobacco: Never Used   Substance and Sexual Activity    Alcohol use: Not Currently    Drug use: No    Sexual activity: Yes     Partners: Female   Lifestyle    Physical activity     Days per week: Not on file     Minutes per session: Not on file    Stress: Rather much   Relationships    Social connections     Talks on phone: Not on file     Gets together: Not on file     Attends Judaism service: Not on file     Active member of club or organization: Not on file     Attends meetings of clubs or organizations: Not on file     Relationship status: Not on file   Other Topics Concern    Not on file   Social History Narrative    Utilizing [employer] transportation which has been  unreliable.      Past Medical History:   Diagnosis Date    Arthritis     Asthma     Atrial fibrillation     Atrial fibrillation with RVR 8/7/2019    CHF (congestive heart failure)     Chronic obstructive pulmonary disease 8/5/2020    Depression     Dermatomyositis     Diabetes mellitus, type 2 Diagnosed in 2000    Elevated PSA     Follow up 7/30/2020    Hypertension     Sleep apnea        Objective:        Physical Exam  Constitutional:       General: He is not in acute distress.     Appearance: He is well-developed. He is not diaphoretic.   HENT:      Head: Normocephalic and atraumatic.      Right Ear: External ear normal.      Left Ear: External ear normal.      Nose: Nose normal.   Eyes:      General:         Right eye: No discharge.         Left eye: No discharge.      Pupils: Pupils are equal, round, and reactive to light.   Neck:      Musculoskeletal: Normal range of motion and neck supple.   Cardiovascular:      Rate and Rhythm: Normal rate and regular rhythm.      Heart sounds: Normal heart sounds.   Pulmonary:      Effort: Pulmonary effort is normal.      Breath sounds: Normal breath sounds.   Abdominal:      Palpations: Abdomen is soft.   Musculoskeletal: Normal range of motion.   Skin:     General: Skin is warm and dry.      Capillary Refill: Capillary refill takes less than 2 seconds.   Neurological:      Mental Status: He is alert and oriented to person, place, and time.      Motor: No abnormal muscle tone.      Coordination: Coordination normal.   Psychiatric:         Behavior: Behavior normal.         Thought Content: Thought content normal.         Judgment: Judgment normal.           Assessment / Plan:     Uncontrolled type 2 diabetes mellitus with hyperglycemia  -     POCT Glucose, Hand-Held Device  -     Ambulatory referral/consult to Diabetes Education  -     Ambulatory referral/consult to Outpatient Case Management    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 with  macular edema associated with type 2 diabetes mellitus    Diabetic polyneuropathy associated with type 2 diabetes mellitus    Stage 3 chronic kidney disease, unspecified whether stage 3a or 3b CKD    Erectile dysfunction, unspecified erectile dysfunction type    Chronic diastolic heart failure    Coronary artery disease of native artery of native heart with stable angina pectoris    Hypertension associated with diabetes    Hyperlipidemia associated with type 2 diabetes mellitus    Disorder of parathyroid gland    Hypogonadism in male    Obesity (BMI 30.0-34.9)      Additional Plan Details:    - POCT Glucose  - Encouraged continuation of lifestyle changes including regular exercise and limiting carbohydrates to 30-45 grams per meal threes times daily and 15 grams per snack with a limit of two daily.   - Encouraged continued monitoring of blood glucose with maintenance of 4 times daily at Fasting, Before Lunch, Before Dinner and Before Bed. Dexcom training to be scheduled.    - Current DM Medication Regimen: Change Lantus to 30 units at night. Change Novolog to 5 units with sliding scale as below three times a day before meals. Continue Metformin XR 1000 twice a day. Continue Ozempic 0.5 mg weekly. Continue Glimepiride 2 mg with breakfast    -  Referral to CDE - Dexcom training   - Referral to Oupatient Case Management - review resources  - Health Maintenance standards addressed today: A1c to be scheduled today  - Nursing Visit: Patient is age 79 or younger with an A1c of 7.5 or greater and qualifies for nursing visit but will defer.   - Follow up in 4 weeks with A1c prior.      If blood sugar is below 70 eat first then check your blood sugar 2 hours later and make correction.   If blood pre-meal sugar is  70 -150 take 5 units of Novolog;   If blood pre-meal sugar is 151-200 take 7 units of Novolog;   If blood pre-meal sugar is 201-250 take 9 units of Novolog;   If blood pre-meal sugar is 251-300 take 11 units of  Novolog;   If blood pre-meal sugar is 301-350 take 13 units of Novolog;   If blood pre-meal sugar is 351-400+ take 15 units of Novolog;   Also increase water intake and call for appointment.    Blakeney McKnight, PA-C [facility]    A total of 30 minutes was spent in face to face time, of which over 50 % was spent in counseling patient on disease process, complications, treatment, and side effects of medications.

## 2020-10-01 NOTE — TELEPHONE ENCOUNTER
----- Message from Nathen Mujica sent at 10/1/2020 10:23 AM CDT -----  Contact: Pt  States he missed his first appt this morning due to transport issues and will be at his 11 am appt and can be reached at [phone].Thanks

## 2020-10-01 NOTE — PROGRESS NOTES
Subjective:       Patient ID: Crow Green is a 65 y.o. male.    Chief Complaint: face to face (power chair)    HPI  Patient here today for mobility evaluation.  He has significant medical conditions of diabetic neuropathy and systolic congestive heart failure which limited his mobility and also give him problems with activities of daily living including but not limited to bathing cooking and eating.  Think that he would benefit from a power chair for improved quality life assistance with maintaining his health in the dependently.  Patient gets significantly short of breath if ambulating more than 15 ft and also has significant lower extremity pain.      Family History   Problem Relation Age of Onset    Glaucoma Mother     Cataracts Mother     Cataracts Father     Glaucoma Sister     Cataracts Sister     Glaucoma Maternal Aunt     No Known Problems Brother     No Known Problems Daughter     No Known Problems Son     Prostate cancer Neg Hx        Current Outpatient Medications:     alcohol swabs (ALCOHOL PREP PADS) PadM, Apply 1 each topically once daily., Disp: 100 each, Rfl: 0    allopurinoL (ZYLOPRIM) 100 MG tablet, Take 1 tablet (100 mg total) by mouth once daily., Disp: 30 tablet, Rfl: 2    aspirin (ECOTRIN) 81 MG EC tablet, Take 1 tablet (81 mg total) by mouth once daily., Disp: , Rfl: 0    atorvastatin (LIPITOR) 40 MG tablet, TAKE 1 TABLET EVERY EVENING, Disp: 90 tablet, Rfl: 4    azaTHIOprine (IMURAN) 50 mg Tab, Take 1 tablet (50 mg total) by mouth once daily., Disp: 90 tablet, Rfl: 0    baclofen (LIORESAL) 10 MG tablet, Take 1 tablet (10 mg total) by mouth once daily., Disp: 90 tablet, Rfl: 3    blood sugar diagnostic (TRUE METRIX GLUCOSE TEST STRIP) Strp, Use with blood glucose meter kit to test blood sugar four times  daily, Disp: 200 strip, Rfl: 99    blood-glucose meter (TRUE METRIX AIR GLUCOSE METER) kit, TEST FOUR TIMES DAILY BEFORE MEALS  AND EVERY NIGHT, Disp: 1 each, Rfl:  0    COLCRYS 0.6 mg tablet, TAKE 1 TABLET EVERY DAY, Disp: 30 tablet, Rfl: 0    diclofenac sodium (VOLTAREN) 1 % Gel, Apply 2 g topically 4 (four) times daily., Disp: 1 Tube, Rfl: 5    doxycycline (VIBRAMYCIN) 100 MG Cap, Take 1 capsule (100 mg total) by mouth 2 (two) times daily., Disp: 20 capsule, Rfl: 0    ELIQUIS 5 mg Tab, TAKE 1 TABLET TWICE DAILY, Disp: 60 tablet, Rfl: 11    fluticasone-salmeterol diskus inhaler 250-50 mcg, Inhale 1 puff into the lungs 2 (two) times daily. Controller, Disp: 60 each, Rfl: 11    food supplemt, lactose-reduced (ENSURE) Liqd, Take 118 mLs by mouth 3 (three) times daily with meals., Disp: 1 Bottle, Rfl: 4    furosemide (LASIX) 40 MG tablet, TAKE 2 TABLETS EVERY MORNING  AND TAKE 1 TABLET EVERY EVENING, Disp: 270 tablet, Rfl: 0    gabapentin (NEURONTIN) 800 MG tablet, Take 1 tablet (800 mg total) by mouth 3 (three) times daily., Disp: 270 tablet, Rfl: 4    glimepiride (AMARYL) 2 MG tablet, TAKE 1 TABLET (2 MG TOTAL) BY MOUTH BEFORE BREAKFAST., Disp: 90 tablet, Rfl: 3    inhalation spacing device, Use as directed for inhalation., Disp: 1 Device, Rfl: 0    insulin (LANTUS SOLOSTAR U-100 INSULIN) glargine 100 units/mL (3mL) SubQ pen, INJECT 35 UNITS SUBCUTANEOUSLY EVERY EVENING, Disp: 75 mL, Rfl: 4    insulin aspart U-100 (NOVOLOG FLEXPEN U-100 INSULIN) 100 unit/mL (3 mL) InPn pen, INJECT 10 UNITS SUBCUTANEOUSLY THREE TIMES DAILY WITH MEALS, Disp: 15 mL, Rfl: 0    insulin syringe-needle U-100 1 mL 31 gauge x 5/16 Syrg, , Disp: , Rfl:     ipratropium (ATROVENT) 0.02 % nebulizer solution, USE 1 VIAL VIA NEBULIZER FOUR TIMES DAILY, Disp: 937.5 mL, Rfl: 2    isosorbide mononitrate (IMDUR) 60 MG 24 hr tablet, Take 1 tablet (60 mg total) by mouth once daily., Disp: 90 tablet, Rfl: 3    lancets 32 gauge Misc, 1 lancet by Misc.(Non-Drug; Combo Route) route 2 (two) times daily., Disp: 100 each, Rfl: 11    latanoprost 0.005 % ophthalmic solution, INSTILL 1 DROP INTO BOTH EYES  "EVERY EVENING, Disp: 1 Bottle, Rfl: 2    magnesium oxide (MAG-OX) 400 mg (241.3 mg magnesium) tablet, Take 1 tablet (400 mg total) by mouth once daily., Disp: , Rfl: 0    metFORMIN (GLUCOPHAGE-XR) 500 MG XR 24hr tablet, Take 2 tablets (1,000 mg total) by mouth 2 (two) times daily with meals., Disp: 360 tablet, Rfl: 3    metoprolol succinate (TOPROL-XL) 50 MG 24 hr tablet, Take 1 tablet (50 mg total) by mouth once daily., Disp: 90 tablet, Rfl: 3    nitroGLYCERIN (NITROSTAT) 0.3 MG SL tablet, PLACE 1 TABLET UNDER THE TONGUE EVERY 5 MINUTES AS NEEDED FOR CHEST PAIN, NO MORE THAN 3 TABLETS IN ONE DAY, Disp: 100 tablet, Rfl: 0    OZEMPIC 1 mg/dose (2 mg/1.5 mL) PnIj, Inject 0.75 mLs into the skin once a week., Disp: 3 mL, Rfl: 11    pantoprazole (PROTONIX) 40 MG tablet, Take 1 tablet (40 mg total) by mouth once daily., Disp: 30 tablet, Rfl: 11    pen needle, diabetic (BD ULTRA-FINE SHORT PEN NEEDLE) 31 gauge x 5/16" Ndle, Inject 1 each into the skin 3 (three) times daily., Disp: 100 each, Rfl: 11    sertraline (ZOLOFT) 100 MG tablet, Take 1 tablet (100 mg total) by mouth every evening., Disp: 90 tablet, Rfl: 4    silver sulfADIAZINE 1% (SILVADENE) 1 % cream, Apply topically 2 (two) times daily., Disp: 50 g, Rfl: 0    tamsulosin (FLOMAX) 0.4 mg Cap, Take 1 capsule (0.4 mg total) by mouth once daily., Disp: 30 capsule, Rfl: 11    tiotropium (SPIRIVA WITH HANDIHALER) 18 mcg inhalation capsule, Inhale 1 capsule (18 mcg total) into the lungs once daily. Controller, Disp: 90 capsule, Rfl: 0    traZODone (DESYREL) 100 MG tablet, Take 2 tablets (200 mg total) by mouth every evening., Disp: 180 tablet, Rfl: 1    VIOS AEROSOL DELIVERY SYSTEM Shefali, USE AS DIRESTED, Disp: , Rfl: 0    [START ON 10/8/2020] HYDROcodone-acetaminophen (NORCO)  mg per tablet, Take 1 tablet by mouth every 8 (eight) hours as needed for Pain., Disp: 20 tablet, Rfl: 0    predniSONE (DELTASONE) 5 MG tablet, Take 1 tablet (5 mg total) by " "mouth 2 (two) times daily., Disp: 60 tablet, Rfl: 0    Review of Systems   Constitutional: Positive for fatigue. Negative for chills and fever.   Eyes: Negative for visual disturbance.   Respiratory: Positive for shortness of breath. Negative for cough.    Cardiovascular: Negative for chest pain.   Gastrointestinal: Negative for abdominal pain.   Musculoskeletal: Positive for gait problem.   Neurological: Negative for dizziness.       Objective:   /82 (BP Location: Left arm, Patient Position: Sitting, BP Method: Medium (Manual))   Pulse 79   Temp 97.5 °F (36.4 °C) (Temporal)   Resp 17   Ht 5' 5" (1.651 m)   Wt 88.3 kg (194 lb 10.7 oz)   SpO2 99%   BMI 32.39 kg/m²      Physical Exam  Vitals signs reviewed.   Constitutional:       Appearance: He is well-developed.      Comments: Patient sitting in wheelchair in exam room.   HENT:      Head: Normocephalic and atraumatic.   Eyes:      Conjunctiva/sclera: Conjunctivae normal.   Cardiovascular:      Rate and Rhythm: Normal rate.   Pulmonary:      Effort: Pulmonary effort is normal. No respiratory distress.   Musculoskeletal:      Comments: 3/5 upper extremity strength bilateral.  2/5 lower extremity strength bilateral.    Skin:     General: Skin is warm and dry.      Findings: No rash.   Neurological:      Mental Status: He is alert and oriented to person, place, and time.      Coordination: Coordination normal.   Psychiatric:         Behavior: Behavior normal.         Assessment & Plan     Believe that patient would benefit from a power chair to help with managing activities of daily living.  Limited upper extremity strength and neuropathy inhibit his ability to use a manual wheelchair.  He is willing to use a prior chair to help with holding grooming eating in cooking using a power chair.  A rolling walker has not been sufficient and also safe for patient.  Did not think there is adequate room for a scooter to maneuver in his home.    Problem List Items " Addressed This Visit        Neuro    Diabetic polyneuropathy    Relevant Orders    WHEELCHAIR FOR HOME USE    Other chronic pain    Relevant Orders    Ambulatory Referral to Primary Care Behavioral Health (Opioids)    Ambulatory referral/consult to Pain Clinic    WHEELCHAIR FOR HOME USE       Cardiac/Vascular    Chronic combined systolic and diastolic HFrEF of 45% (Chronic)    Relevant Orders    WHEELCHAIR FOR HOME USE      Other Visit Diagnoses     Colon cancer screening    -  Primary    Relevant Orders    Case request GI: COLONOSCOPY (Completed)            No follow-ups on file.    Disclaimer:  This note may have been prepared using voice recognition software, it may have not been extensively proofed, as such there could be errors within the text such as sound alike errors.

## 2020-10-02 ENCOUNTER — OUTPATIENT CASE MANAGEMENT (OUTPATIENT)
Dept: ADMINISTRATIVE | Facility: OTHER | Age: 65
End: 2020-10-02

## 2020-10-02 ENCOUNTER — DOCUMENTATION ONLY (OUTPATIENT)
Dept: PRIMARY CARE CLINIC | Facility: CLINIC | Age: 65
End: 2020-10-02

## 2020-10-02 LAB
ESTIMATED AVG GLUCOSE: 143 MG/DL (ref 68–131)
HBA1C MFR BLD HPLC: 6.6 % (ref 4–5.6)

## 2020-10-03 NOTE — PROGRESS NOTES
[facility] pharmacist performed a medication reconciliation on the patient's controlled substances and psychotropic medications using the [employer] prescription medication fills portal.    Patient currently filling opioids on time from one provider (Dr. Veras) and is deemed to be at low risk of overdose per [employer] records.  Current regimen of hydrocodone-APAP  mg three times daily (33.3 morphine equivalents per day).  Co-prescription of naloxone is currently not required.  Continual monitoring and assessment of additional risk factors for opioid overdose should always be considered.    Patient has filled >3 opioid prescriptions in the past 4 month, and therefore meets the chronic opioid criteria for enrollment in the program.    Patient is currently filling their psychotropic medications intermittently per [employer] reports.  Current regimen of sertraline 100 mg daily, filled for three separate 3-month prescriptions in the past two years. Patient also on trazodone 200 mg nightly filled intermittently as well.  No other antidepressants filled in the past two years.  [facility] pharmacist has not confirmed adherence by means of contacting the patient.  Follow up warranted on adherence to this regimen.    Recommendations for PCP:  1.  Co-prescription of naloxone not currently required, though should always be considered in a patient on chronic opioid therapy  2.  Current regimen of sertraline 100 mg daily + trazodone 200 mg nightly for sleep.  [facility] pharmacist will determine his role in treatment following patient's intake appointment with [facility] LCSW.      Lalo Posey, PharmD, BCPP  [facility] Pharmacist

## 2020-10-05 ENCOUNTER — TELEPHONE (OUTPATIENT)
Dept: PRIMARY CARE CLINIC | Facility: CLINIC | Age: 65
End: 2020-10-05

## 2020-10-05 ENCOUNTER — PATIENT MESSAGE (OUTPATIENT)
Dept: ADMINISTRATIVE | Facility: HOSPITAL | Age: 65
End: 2020-10-05

## 2020-10-05 ENCOUNTER — TELEPHONE (OUTPATIENT)
Dept: INTERNAL MEDICINE | Facility: CLINIC | Age: 65
End: 2020-10-05

## 2020-10-05 ENCOUNTER — CARE AT HOME (OUTPATIENT)
Dept: HOME HEALTH SERVICES | Facility: CLINIC | Age: 65
End: 2020-10-05
Payer: MEDICARE

## 2020-10-05 ENCOUNTER — PATIENT OUTREACH (OUTPATIENT)
Dept: ADMINISTRATIVE | Facility: OTHER | Age: 65
End: 2020-10-05

## 2020-10-05 VITALS
SYSTOLIC BLOOD PRESSURE: 172 MMHG | HEART RATE: 82 BPM | TEMPERATURE: 98 F | RESPIRATION RATE: 18 BRPM | OXYGEN SATURATION: 96 % | DIASTOLIC BLOOD PRESSURE: 80 MMHG

## 2020-10-05 DIAGNOSIS — Z09 FOLLOW UP: Primary | ICD-10-CM

## 2020-10-05 PROCEDURE — 3077F PR MOST RECENT SYSTOLIC BLOOD PRESSURE >= 140 MM HG: ICD-10-PCS | Mod: CPTII,S$GLB,, | Performed by: NURSE PRACTITIONER

## 2020-10-05 PROCEDURE — 99499 UNLISTED E&M SERVICE: CPT | Mod: S$GLB,,, | Performed by: NURSE PRACTITIONER

## 2020-10-05 PROCEDURE — 99349 HOME/RES VST EST MOD MDM 40: CPT | Mod: S$GLB,,, | Performed by: NURSE PRACTITIONER

## 2020-10-05 PROCEDURE — 3079F PR MOST RECENT DIASTOLIC BLOOD PRESSURE 80-89 MM HG: ICD-10-PCS | Mod: CPTII,S$GLB,, | Performed by: NURSE PRACTITIONER

## 2020-10-05 PROCEDURE — 99349 PR HOME VISIT,ESTAB PATIENT,LEVEL III: ICD-10-PCS | Mod: S$GLB,,, | Performed by: NURSE PRACTITIONER

## 2020-10-05 PROCEDURE — 3079F DIAST BP 80-89 MM HG: CPT | Mod: CPTII,S$GLB,, | Performed by: NURSE PRACTITIONER

## 2020-10-05 PROCEDURE — 99499 RISK ADDL DX/OHS AUDIT: ICD-10-PCS | Mod: S$GLB,,, | Performed by: NURSE PRACTITIONER

## 2020-10-05 PROCEDURE — 3077F SYST BP >= 140 MM HG: CPT | Mod: CPTII,S$GLB,, | Performed by: NURSE PRACTITIONER

## 2020-10-05 NOTE — TELEPHONE ENCOUNTER
----- Message from Kaila Vaughan sent at 10/5/2020 11:35 AM CDT -----  States he put in for a back brace and they will be calling us. States he doesn't know what company it is with, he got it off of the TV. Please call pt [phone]. Thank you

## 2020-10-05 NOTE — ASSESSMENT & PLAN NOTE
He certainly has been making efforts to improve this but is having little progress.  [facility] have a follow-up appointment there.  He is likely going to be a surgical candidate due to the MRI results and lack of response to the injection that he had.  I recommended that he go up to 900 mg 3 times a day of the gabapentin.  He has 300 mg tablets and 600 mg tablets so I told him to take those together.  Will continue to provide him with Norco to help with severe pain.  When he is due for his next refill will give him a month's supply.  He signed a chronic drug use contract today and understands the stipulations of this.   Quality 130: Documentation Of Current Medications In The Medical Record: Current Medications Documented Detail Level: Detailed Quality 402: Tobacco Use And Help With Quitting Among Adolescents: Patient screened for tobacco and never smoked Quality 431: Preventive Care And Screening: Unhealthy Alcohol Use - Screening: Patient screened for unhealthy alcohol use using a single question and scores less than 2 times per year Quality 110: Preventive Care And Screening: Influenza Immunization: Influenza immunization was not ordered or administered, reason not given

## 2020-10-06 ENCOUNTER — OUTPATIENT CASE MANAGEMENT (OUTPATIENT)
Dept: ADMINISTRATIVE | Facility: OTHER | Age: 65
End: 2020-10-06

## 2020-10-06 ENCOUNTER — TELEPHONE (OUTPATIENT)
Dept: ORTHOPEDICS | Facility: CLINIC | Age: 65
End: 2020-10-06

## 2020-10-06 ENCOUNTER — OFFICE VISIT (OUTPATIENT)
Dept: PAIN MEDICINE | Facility: CLINIC | Age: 65
End: 2020-10-06
Payer: MEDICARE

## 2020-10-06 VITALS
WEIGHT: 191.56 LBS | HEART RATE: 88 BPM | HEIGHT: 65 IN | BODY MASS INDEX: 31.92 KG/M2 | SYSTOLIC BLOOD PRESSURE: 114 MMHG | DIASTOLIC BLOOD PRESSURE: 71 MMHG

## 2020-10-06 DIAGNOSIS — G89.29 OTHER CHRONIC PAIN: ICD-10-CM

## 2020-10-06 DIAGNOSIS — M47.816 LUMBAR SPONDYLOSIS: ICD-10-CM

## 2020-10-06 DIAGNOSIS — M12.812 ROTATOR CUFF TEAR ARTHROPATHY OF LEFT SHOULDER: Primary | ICD-10-CM

## 2020-10-06 DIAGNOSIS — M54.16 LUMBAR RADICULOPATHY: ICD-10-CM

## 2020-10-06 DIAGNOSIS — M75.102 ROTATOR CUFF TEAR ARTHROPATHY OF LEFT SHOULDER: Primary | ICD-10-CM

## 2020-10-06 PROCEDURE — 99999 PR PBB SHADOW E&M-EST. PATIENT-LVL V: ICD-10-PCS | Mod: PBBFAC,HCNC,, | Performed by: ANESTHESIOLOGY

## 2020-10-06 PROCEDURE — 3008F BODY MASS INDEX DOCD: CPT | Mod: HCNC,CPTII,S$GLB, | Performed by: ANESTHESIOLOGY

## 2020-10-06 PROCEDURE — 3078F PR MOST RECENT DIASTOLIC BLOOD PRESSURE < 80 MM HG: ICD-10-PCS | Mod: HCNC,CPTII,S$GLB, | Performed by: ANESTHESIOLOGY

## 2020-10-06 PROCEDURE — 3074F PR MOST RECENT SYSTOLIC BLOOD PRESSURE < 130 MM HG: ICD-10-PCS | Mod: HCNC,CPTII,S$GLB, | Performed by: ANESTHESIOLOGY

## 2020-10-06 PROCEDURE — 3078F DIAST BP <80 MM HG: CPT | Mod: HCNC,CPTII,S$GLB, | Performed by: ANESTHESIOLOGY

## 2020-10-06 PROCEDURE — 3074F SYST BP LT 130 MM HG: CPT | Mod: HCNC,CPTII,S$GLB, | Performed by: ANESTHESIOLOGY

## 2020-10-06 PROCEDURE — 99999 PR PBB SHADOW E&M-EST. PATIENT-LVL V: CPT | Mod: PBBFAC,HCNC,, | Performed by: ANESTHESIOLOGY

## 2020-10-06 PROCEDURE — 3008F PR BODY MASS INDEX (BMI) DOCUMENTED: ICD-10-PCS | Mod: HCNC,CPTII,S$GLB, | Performed by: ANESTHESIOLOGY

## 2020-10-06 PROCEDURE — 99213 PR OFFICE/OUTPT VISIT, EST, LEVL III, 20-29 MIN: ICD-10-PCS | Mod: HCNC,S$GLB,, | Performed by: ANESTHESIOLOGY

## 2020-10-06 PROCEDURE — 99213 OFFICE O/P EST LOW 20 MIN: CPT | Mod: HCNC,S$GLB,, | Performed by: ANESTHESIOLOGY

## 2020-10-06 NOTE — PROGRESS NOTES
"[facility]  Medical Home Visit    Visit Date: 10/05/20  Encounter Provider: Sumit Black NP  PCP:  Mateo Lambert DO    Subjective:      Patient ID: Crow Green is a 65 y.o. male.    Consult Requested By:  Sumit Black  Reason for Consult: Medical Visit by Home Care Provider    The patient is being seen at home due to a physical debility that presents a taxing effort to leave the home, to mitigate high risk of hospital readmission or due to the limited availability of reliable or safe options for transportation to the point of access to the provider. The visit meets the criteria for medical necessity as defined by CMS as "health-care services needed to prevent, diagnose, or treat an illness, injury, condition, disease, or its symptoms and that meet accepted standards of medicine." Prior to treatment on this visit the chart was reviewed and patient consent was obtained.    Chief Complaint: Follow-up for chronic medical conditions and medication review,    Today:  Mr. Crow Green is a 65 y.o. male Crow presents at his baseline state of health as reported by patient and caregiver, his father, with whom he lives. VSS. Blood pressure slightly elevated at the time of my visiMy previous directive to hold the lisinopril was not maintained by the patient. Today It, but he had not yet taken his morning medications. He is encouraged to do so. He has established transportatioon to his multiple doctor's appointments with a transport service.He has no difficulty getting to his appointments. [facility] to follow up as needed in the future. Denies any acute issues, concerns or complaints to address on today's visit.  Reports taking all medications as prescribed. No other needs identified at this time. Risks of environmental exposure to coronavirus discussed including: social distancing, hand hygiene, awnd limiting departures from the home for necessities only.  " Reports understanding and willingness to comply.     Review of Systems   Constitutional: Positive for appetite change. Negative for chills, fever and unexpected weight change.   HENT: Negative.    Eyes: Negative.    Respiratory: Positive for cough (improved). Negative for apnea and shortness of breath.    Cardiovascular: Positive for leg swelling. Negative for chest pain and palpitations.   Gastrointestinal: Positive for abdominal distention.   Endocrine: Negative.    Musculoskeletal: Positive for arthralgias, gait problem and joint swelling.   Skin: Positive for color change and wound (left foot amputation).   Hematological: Negative.    Psychiatric/Behavioral: Positive for agitation. Confusion: r/t plan of care and meds.   All other systems reviewed and are negative.    Assessments:  · Environmental: single story home, no steps to enter, adequate lighting and temeprature control  · Functional Status: Independent with ADL's/IADL's, ambulates with assistance of a cane/walker, continent of bowel and bladder  · Safety: Fall Precautions, COVID Precautions/Social Distancing/Mask Use  · Nutritional: Adequate  · [facility]: [facility]   · DME/Supplies: walker/wpud care supplies, glucometer, blood pressure cuff     Objective:     Vitals:    10/05/20 1148   BP: (!) 172/80   Pulse: 82   Resp: 18   Temp: 97.8 °F (36.6 °C)   TempSrc: Temporal   SpO2: 96%   PainSc:   7     There is no height or weight on file to calculate BMI.    Physical Exam  Constitutional:       General: He is awake.      Appearance: Normal appearance. He is obese.   HENT:      Head: Normocephalic.      Nose: Nose normal.   Eyes:      General: Lids are normal.   Neck:      Musculoskeletal: Normal range of motion and neck supple.   Cardiovascular:      Rate and Rhythm: Normal rate and regular rhythm.      Pulses:           Dorsalis pedis pulses are 2+ on the right side and 1+ on the left side.      Heart sounds: Normal heart sounds. No murmur.   Pulmonary:       Effort: Pulmonary effort is normal. No accessory muscle usage, prolonged expiration or respiratory distress.      Breath sounds: Normal breath sounds.   Abdominal:      General: Abdomen is flat. Bowel sounds are normal. There is no distension.      Palpations: Abdomen is soft.      Tenderness: There is no abdominal tenderness.   Musculoskeletal:         General: Tenderness and signs of injury (surgery to left foot) present.      Left ankle: He exhibits decreased range of motion, swelling, deformity and abnormal pulse. Tenderness.      Right lower leg: Edema present.      Left lower leg: Edema present.      Left foot: Decreased range of motion.   Feet:      Right foot:      Skin integrity: Skin integrity normal.      Toenail Condition: Right toenails are abnormally thick.      Left foot:      Amputation: (great toe)     Skin integrity: Skin integrity normal.      Comments: Amputee of great toe, foot wrapped with ace  Skin:     General: Skin is warm and dry.      Capillary Refill: Capillary refill takes less than 2 seconds.      Findings: Bruising present.   Neurological:      General: No focal deficit present.      Mental Status: He is alert and oriented to person, place, and time. Mental status is at baseline.      GCS: GCS eye subscore is 4. GCS verbal subscore is 5. GCS motor subscore is 6.      Motor: Motor function is intact. No weakness.      Gait: Gait abnormal (2/2 surgery).   Psychiatric:         Mood and Affect: Mood normal.         Behavior: Behavior normal. Behavior is cooperative.       Assessment:     1. Follow up      Plan:     Ethical / Legal: Advance Care Planning   Capacity to make medical decisions:  yes, Conflict no  · Surrogate decision maker:  Name Erica Green Relationship: Aunt  · Advance Directives:  none  · HCPOA: none  · LaPOST:  On file  · Code Status:  Full    Advanced Care Directives, and HCPOA  forms left in the home for family review, discussion and signing with instructions  to return upon their next provider encounter for inclusion to the medical record. LaPost signed by patient this visit full code status     Encounter for Medical Follow-Up and Medication Review   - [facility] Nurse Practitioner to schedule home visit with patient PRN.    Were controlled substances prescribed?  No    Follow Up Appointments:   Future Appointments   Date Time Provider Department Center   10/7/2020  1:00 PM Barb Veras DPM [facility] POD [place]   10/13/2020 11:00 AM Awilda Nogueira [employer] Ifeanyi Cisse PCW   10/21/2020  7:30 AM Edwin Luciano MD [facility] ORTHO [place]   10/29/2020 10:40 AM Eduard Zhao MD [facility] CARDIO [place]   11/5/2020  2:00 PM Eduardo Castillo PA-C [facility] [place]   11/20/2020 11:00 AM Cisco Luciano MD [facility] ORTHO [place]       Attestation: Screening criteria to assess the level of the patient's risk for infection with COVID-19 as recommended by the CDC at the time of the above documented home visit concluded appropriateness to proceed. Universal precautions were maintained at all times, including provider use of >60% alcohol gel hand  immediately prior to entry and upon departing the patient's home as well as cleaning of equipment used in home visit with antibacterial/germicidal disposable wipes.     Signature:    Sumit Black, MSN, APRN, FNP-C  [facility]    Total face-to-face time was 40 min, >50% of this was spent on counseling and coordination of care. The following issues were discussed: primary and secondary diagnoses, co-morbidities, prescribed medications, treatment modalities, importance of compliance with medical advice and directives for follow-up care.

## 2020-10-06 NOTE — LETTER
October 6, 2020      Mateo Lambert,   [address] [address] [address]           [facility]  [address] [facility] [address]  Phone: [phone]  Fax: [phone]          Patient: Crow Green   MR Number: [id number]   YOB: 1955   Date of Visit: 10/6/2020       Dear Dr. Mateo Lambert:    Thank you for referring Crow Green to me for evaluation. Attached you will find relevant portions of my assessment and plan of care.    If you have questions, please do not hesitate to call me. I look forward to following Crow Green along with you.    Sincerely,    Dillan Tsai MD    Enclosure  CC:  No Recipients    If you would like to receive this communication electronically, please contact [email or web address] or [phone] to request more information on [employer] Link access.    For providers and/or their staff who would like to refer a patient to [facility], please contact us through our one-stop-shop provider referral line, [facility], at [phone].    If you feel you have received this communication in error or would no longer like to receive these types of communications, please e-mail [email or web address]

## 2020-10-06 NOTE — PATIENT INSTRUCTIONS
- [facility] to schedule follow-up visit with patient as needed.  - Continue all medications, treatments and therapies as ordered.   - Follow all instructions, recommendations as discussed.  - Maintain Safety Precautions at all times.  - Attend all medical appointments as scheduled.  - For worsening symptoms: call Primary Care Physician or Nurse Practitioner.  - For emergencies, call 911 or immediately report to the nearest emergency room.  - Limit Risks of environmental exposure to coronavirus as discussed including: social distancing, hand hygiene, and limiting departures from the home for necessities only.     Your care is important to us. If your provider recommended a follow-up appointment or test, we are happy to help you coordinate your recommended care. It is important that you complete your recommended follow-up. If you need help scheduling, please call [phone]. Appointments can also be made online through the patient portal.  While scheduling and attending your appointments is your responsibility, our goal is to support and empower you throughout that process.    [facility] Care Line - 24/7 Assistance  Unless otherwise directed by your provider, please contact [facility], our nurse care line that is available for 24/7 assistance.  Registered nurses in the [facility] provide: appointment scheduling, clinical advisement, health education, and other advisory services.  Call: [phone] (toll free)    COVID-19 Prevention   Avoid close contact with people and stay home if youre sick, except to get medical care.   Cover coughs and sneezes with a tissue, or use the inside of your elbow. Immediately wash your hands or use hand .  For more information, see CDC link below:  https://www.cdc.gov/coronavirus/2019-ncov/hcp/guidance-prevent-spread.html#precautions     The following information is provided to all patients.  This information is to help you find  resources for any of the problems found today that may be affecting your health:            Living healthy guide: www.[facility]placed [facility].[place].[facility]    Understanding Diabetes: www.diabetes.org   Eating healthy: www.cdc.gov/healthyweight   CDC home safety checklist: www.cdc.gov/steadi/patient.html  Agency on Aging: www.goea.[place].[facility]    Alcoholics anonymous (AA): www.aa.org   Physical Activity: www.fabrice.nih.gov/gu5oalb    Tobacco use: www.quitwithusla.org

## 2020-10-06 NOTE — PROGRESS NOTES
Chief Pain Complaint:  Low-back Pain, Leg Pain, and Foot Pain    Interval History:  Patient was last seen on 7-19-18 with Dr. Tsai. At that visit, the plan was to move forward with PITO to help with left leg pain.  The injection had been canceled and rescheduled more than once.  He now states that pain is in both legs, where before it was just the left leg.     History of Present Illness:   Crow Green is a 65 y.o. male  who is presenting with a chief complaint of Low-back Pain. The patient began experiencing this problem insidiously, and the pain has been gradually worsening over the past 6 month(s). The pain is described as throbbing, shooting, burning and electrical and is located in the bilateral lumbar spine. Pain is intermittent and lasts hours. The pain radiates to bilateral lower extremities. The patient rates his pain a 3 out of ten and interferes with activities of daily living a 8 out of ten. Pain is exacerbated by flexion of the lumbar spine, and is improved by rest. Patient reports no prior trauma, no prior spinal surgery     - pertinent negatives: No fever, No chills, No weight loss, No bladder dysfunction, No bowel dysfunction, No saddle anesthesia  - pertinent positives: right leg weakness    - medications, other therapies tried (physical therapy, injections):     >> NSAIDs, Tylenol, Norco, gabapentin and flexeril    >> Has previously undergone Physical Therapy    >> Has NOT previously undergone spinal injection/s      Imaging / Labs / Studies (reviewed on 10/6/2020):    Results for orders placed during the hospital encounter of 03/06/18   MRI Lumbar Spine Without Contrast    Narrative TECHNIQUE:  Multiplanar, multisequence MR images were acquired from the thoracolumbar junction to the sacrum without the administration of contrast.  COMPARISON:  None.  FINDINGS:  Vertebral body heights alignment are within normal limits.  There is moderate disc height loss at L5-S1.  Mild disc height loss  also noted and L1-2.  There is mild disc desiccation at L3-4 and L4-5. marrow signal is within normal limits with no evidence of fracture or marrow replacement process.  Conus appears within normal limits terminating at the level of the L1 level.  Cauda equina appears unremarkable  Paraspinous soft tissues appear within normal limits.  T12-L1:   No spinal canal or neuroforaminal narrowing.  L1-2: Small posterior central disc extrusion with superior migration.  No spinal canal or neuroforaminal narrowing.  L2-3:  No spinal canal or neuroforaminal narrowing.  L3-4:  No spinal canal or neuroforaminal narrowing.  L4-5: Mild generalized disc bulge and mild bilateral facet arthropathy.  Moderate left and mild right neuroforaminal narrowing.  No spinal canal stenosis.  L5-S1: Large central/left paracentral disc extrusion with slight inferior migration.  Moderate bilateral facet arthropathy.  Disc material contacts and displaces the descending left S1 nerve root.  Moderate to severe bilateral neural foraminal narrowing.    Impression 1. Posterior central/left paracentral disc extrusion at L5-S1 with mass effect on the descending left S1 nerve root and moderate to severe bilateral neural foraminal narrowing.  2. Other multilevel degenerative disc disease as above.       Results for orders placed during the hospital encounter of 02/10/18   X-Ray Lumbar Spine Ap And Lateral    Narrative Lumbar spine, 5 views  Clinical History:   Pain in the low back.  Findings:      The vertebral body heights and alignment are within normal limits. No acute fracture or subluxation.There is bilateral facet joint osteoarthritis at L4-L5 and L5-S1.  There is a very subtle dextroscoliosis of lumbar spine centered at L2-L3.    Impression      No acute fracture or subluxation. Bilateral facet joint osteoarthritis at L4-L5 and L5-S1.                         Review of Systems:  CONSTITUTIONAL: patient denies any fever, chills, or weight loss  SKIN:  "patient denies any rash or itching  RESPIRATORY: patient denies having any shortness of breath  GASTROINTESTINAL: patient denies having any diarrhea, constipation, or bowel incontinence  GENITOURINARY: patient denies having any abnormal bladder function    MUSCULOSKELETAL:  - patient complains of the above noted pain/s (see chief pain complaint)    NEUROLOGICAL:   - pain as above  - strength in Lower extremities is decreased, on the RIGHT  - sensation in Lower extremities is intact, BILATERALLY  - patient denies any loss of bowel or bladder control      PSYCHIATRIC: patient denies any change in mood    Other:  All other systems reviewed and are negative      Physical Exam:  Vitals:  /71   Pulse 88   Ht 5' 5" (1.651 m)   Wt 86.9 kg (191 lb 9.3 oz)   BMI 31.88 kg/m²   (reviewed on 10/6/2020)    General: alert and oriented, in no apparent distress.  Gait: antalgic gait.  Skin: no rashes, no discoloration, no obvious lesions  HEENT: normocephalic, atraumatic. Pupils equal and round.  Cardiovascular: no significant peripheral edema present.  Respiratory: without use of accessory muscles of respiration.    Musculoskeletal - Lumbar Spine:  - Pain on flexion of lumbar spine: Present  - Pain on extension of lumbar spine: Present  - Lumbar facet loading: Absent   - TTP over the lumbar facet joints: Absent  - TTP over the lumbar paraspinals: Present  - TTP over the SI joints:  Absent   - TTP over GT bursa: Absent   - Straight Leg Raise: Present  - YONIS: Negative    Neuro - Lower Extremities:  - RLE Strength:     >> 5/5 strength with right hip flexion/ extension    >> 5/5 strength with right knee flexion/ extension    >> 5/5 strength in right ankle with plantar and dorsiflexion  - LLE Strength:     >> 5/5 strength with left hip flexion/ extension    >> 5/5 strength with knee flexion extension on the left     >> 5/5 strength in left ankle with plantar and dorsiflexion  - BLE Strength: R/L: HF: 5/5, HE: 5/5, KF: 5/5; " KE: 5/5; FE: 5/5; FF: 5/5  - Extremity Reflexes: Brisk and symmetric throughout  - Sensory: Sensation to light touch intact bilaterally      Psych:  Mood and affect is appropriate          Assessment:  Crow Green is a 65 y.o. year old male who is presenting with   Encounter Diagnoses   Name Primary?    Other chronic pain     Rotator cuff tear arthropathy of left shoulder Yes    Lumbar spondylosis     Lumbar radiculopathy        Plan:  1. Interventional: None for now.   - S/p  Left L4-5, L5-S1 TFESI on 6/11/2020 with minimal relief     2. Pharmacologic:   - Continue Gabapentin 600 mg Po TID.     3. Rehabilitative: Encouraged regular exercise.    4. Diagnostic: None for now.    5. Consult: Neurosurgery Dr Chandra recommended anterior lumbar interbody fusion at L5-S.  Orthopedics for left rotator cuff tear.     5. Follow up: PRN.

## 2020-10-06 NOTE — PROGRESS NOTES
Outpatient Care Management   - Care Plan Follow Up    Patient: Crow Green  MRN:  [id number]  Date of Service:  10/6/2020  Completed by:  Amanda Whipple LCSW  Referral Date: 10/01/2020  Program: Case Management (High Risk)    Reason for Visit   Patient presents with    OPCM SW First Follow-up Attempt     10/6/20    Update Plan Of Care       Brief Summary: Phoned patient. Reviewed referral to Behavioral Integration Program which will provide services for counseling. Patient noted improvement but still unable to go to [facility] at this time. Patient stated that he had not received the application from [employer] [facility] for home [employer]. Patient requested assistance. Agreed to meet patient prior to his podiatry appointment tomorrow at 12:30 at [facility].   Reviewed referral for sam, program applications closed this year on 5/20.    Complex Care Plan     Care plan was discussed and completed today with input from patient and/or caregiver.    Patient Instructions     Instructions were provided via the [employer] patient resources and are available for the patient to view on the patient portal.    Follow up in about 1 day (around 10/7/2020) for Review of home sam program, case closure. .    Todays OPCM Self-Management Care Plan was developed with the patients/caregivers input and was based on identified barriers from todays assessment.  Goals were written today with the patient/caregiver and the patient has agreed to work towards these goals to improve his/her overall well-being. Patient verbalized understanding of the care plan, goals, and all of today's instructions. Encouraged patient/caregiver to communicate with his/her physician and health care team about health conditions and the treatment plan.  Provided my contact information today and encouraged patient/caregiver to call me with any questions as needed.

## 2020-10-07 ENCOUNTER — OUTPATIENT CASE MANAGEMENT (OUTPATIENT)
Dept: ADMINISTRATIVE | Facility: OTHER | Age: 65
End: 2020-10-07

## 2020-10-07 ENCOUNTER — PATIENT MESSAGE (OUTPATIENT)
Dept: ORTHOPEDICS | Facility: CLINIC | Age: 65
End: 2020-10-07

## 2020-10-07 ENCOUNTER — OFFICE VISIT (OUTPATIENT)
Dept: PODIATRY | Facility: CLINIC | Age: 65
End: 2020-10-07
Payer: MEDICARE

## 2020-10-07 VITALS
BODY MASS INDEX: 31.82 KG/M2 | HEIGHT: 65 IN | HEART RATE: 93 BPM | DIASTOLIC BLOOD PRESSURE: 82 MMHG | WEIGHT: 191 LBS | SYSTOLIC BLOOD PRESSURE: 136 MMHG

## 2020-10-07 DIAGNOSIS — L97.522 ULCER OF LEFT FOOT, WITH FAT LAYER EXPOSED: Primary | ICD-10-CM

## 2020-10-07 DIAGNOSIS — Z89.412 STATUS POST AMPUTATION OF LEFT GREAT TOE: ICD-10-CM

## 2020-10-07 DIAGNOSIS — M25.512 LEFT SHOULDER PAIN, UNSPECIFIED CHRONICITY: Primary | ICD-10-CM

## 2020-10-07 DIAGNOSIS — G89.18 ACUTE POST-OPERATIVE PAIN: ICD-10-CM

## 2020-10-07 DIAGNOSIS — T87.81 DEHISCENCE OF AMPUTATION STUMP: ICD-10-CM

## 2020-10-07 PROCEDURE — 99024 POSTOP FOLLOW-UP VISIT: CPT | Mod: HCNC,S$GLB,, | Performed by: PODIATRIST

## 2020-10-07 PROCEDURE — 99999 PR PBB SHADOW E&M-EST. PATIENT-LVL V: CPT | Mod: PBBFAC,HCNC,, | Performed by: PODIATRIST

## 2020-10-07 PROCEDURE — 99999 PR PBB SHADOW E&M-EST. PATIENT-LVL V: ICD-10-PCS | Mod: PBBFAC,HCNC,, | Performed by: PODIATRIST

## 2020-10-07 PROCEDURE — 99024 PR POST-OP FOLLOW-UP VISIT: ICD-10-PCS | Mod: HCNC,S$GLB,, | Performed by: PODIATRIST

## 2020-10-07 RX ORDER — HYDROCODONE BITARTRATE AND ACETAMINOPHEN 10; 325 MG/1; MG/1
1 TABLET ORAL EVERY 8 HOURS PRN
Qty: 20 TABLET | Refills: 0 | Status: SHIPPED | OUTPATIENT
Start: 2020-10-08 | End: 2020-10-14 | Stop reason: SDUPTHER

## 2020-10-07 NOTE — PROGRESS NOTES
10/7/20 - Emergency Preparedness Contact today    [x] Called and reviewed disaster plan with patient/caregiver.  Provided emergency phone number for patient's Yoanna.   [] Attempted to reach patient/caregiver to review disaster plan.  Left a voice message with the phone number for patientelver [place] Office of Emergency Preparedness.     Reviewed with Patient/Caregiver:  [x] Patient to have a supply of water, non-perishable food items, flashlights and batteries  [x] Patient to bring all medications if evacuates:  at least a five day supply preferably in the medication bottles, in case displaced for longer than 5 days  [] Patient to bring any DME needed (walkers, wheelchairs, shower chairs, nebulizer machine, etc.)   [] If Diabetic, patient to bring glucometer and monitoring supplies.   [] If Hypertension, patient to bring blood pressure cuff  [] If CHF, patient to bring scale  [] If tube feeds, patient to bring tube feedings and feeding supplies.  [] If on oxygen,  patient to bring all oxygen supplies (Cannula, portable tanks, concentrator).  Patient will contact oxygen supply company to find out the nearest location of a supply company near evacuated location. ([employer]: [phone], [facility] [place]: [phone], [facility]:[phone], [employer]: [phone]), [facility] -[phone].  [] If on hemodialysis, patient should already have a plan in place with dialysis center. If patient does not know where to evacuate to in order to be close to a dialysis center, patient/caregiver to reach out to regular dialysis center for further direction. ([facility]  service line: [phone], [facility]  service line [phone])  [] If receiving treatment at an infusion center, patient/caregiver to contact the infusion center to find out which infusion center they have a contract with where patient plans to evacuate.      Office of Emergency Preparedness Phone number:  [] [facility] [place]:  [phone]  [] [facility]: [phone]  [] [place]: [phone]  [] [place]: [phone]  [] [place]: [phone]  [] [place]: [phone]  [x] [facility]: [phone]  [] [facility]: [phone]  [] [facility] [place]: [phone]  [] [place]: [phone]  [] [facility]: [phone]  [] [place]: [phone]  [] [facility]: [phone]    [] [facility]:  [phone]   [] [facility]:  [phone]   [] [facility]:  [phone]   [] [facility]:  [phone]   [] [facility]:  [phone]   [] [facility]: [phone]   [] [place]:  [phone]   [] [facility]:  [phone]   [] [facility]:  [phone]   [] [facility]:  [phone]   [] [facility]:  [phone]   [] [place]:  [phone]   [] [facility]:  [phone]   [] [facility]:  [phone]   [] [facility]:  [phone]   [] [facility]: [phone]   [] [facility]:  [phone]   [] [facility]: [phone]   [] [facility]: [phone]

## 2020-10-07 NOTE — PROGRESS NOTES
Subjective:     Patient ID: Crow Green is a 65 y.o. male.    Chief Complaint: Post-op Evaluation (rates pain 7/10. wears post op shoe with dressing. diabetic Pt. PCP Dr. Lambert.)    HPI: This 65 year old male returns to the clinic 3 months status post 3 months status post left hallux amputation procedure. Patient has no complaints of fever chills or sweats. Positive pain, rates pain 7/10. Patient states dressing was kept dry, clean, and intact. Patient states home health has been coming out.  Patient has no other pedal complaints today.      Patient Active Problem List   Diagnosis    ED (erectile dysfunction)    Non-proliferative diabetic retinopathy, mild, both eyes    Hypertension associated with diabetes    Diabetic polyneuropathy    Nonobstructive coronary artery disease of native artery of native heart    Chronic combined systolic and diastolic HFrEF of 45%    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, with long-term current use of insulin    SANDRITA on CPAP    Moderate asthma    Psychophysiological insomnia    Nightmares    Sleep related gastroesophageal reflux disease    Inadequate sleep hygiene    PAF (paroxysmal atrial fibrillation)    At risk for medication noncompliance    Obesity (BMI 30.0-34.9)    Indeterminate stage chronic open angle glaucoma    Right hip pain    Gait instability    Chronic right shoulder pain    Arthritis    Left sided sciatica    Dyspnea on exertion    Osteoarthritis of spine with radiculopathy, lumbar region    HNP (herniated nucleus pulposus), lumbar    Gastroesophageal reflux disease without esophagitis    Chronic diastolic heart failure    Hypogonadism in male    Disorder of parathyroid gland    Hyperlipidemia associated with type 2 diabetes mellitus    JOSE MARIA (acute kidney injury)    LRTI (lower respiratory tract infection)    Physical deconditioning    Acute pain of left shoulder     Traumatic tear of left rotator cuff    Atrial fibrillation with RVR    Other chronic pain    Left shoulder pain    Complete tear of left rotator cuff    Stage 3 chronic kidney disease    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 associated with type 2 diabetes mellitus    Lumbar radiculopathy    Diabetic ulcer of toe of left foot associated with type 2 diabetes mellitus, with necrosis of muscle    Ulcer of left foot    Elevated troponin    Hypotension due to medication    Dermatomyositis    Postprocedural hypotension    Follow up    Weakness generalized    Advanced directives, counseling/discussion    Cocaine dependence, in remission    Schizoaffective disorder, depressive type    Chronic obstructive pulmonary disease       Medication List with Changes/Refills   Current Medications    ALCOHOL SWABS (ALCOHOL PREP PADS) PADM    Apply 1 each topically once daily.    ALLOPURINOL (ZYLOPRIM) 100 MG TABLET    Take 1 tablet (100 mg total) by mouth once daily.    ASPIRIN (ECOTRIN) 81 MG EC TABLET    Take 1 tablet (81 mg total) by mouth once daily.    ATORVASTATIN (LIPITOR) 40 MG TABLET    TAKE 1 TABLET EVERY EVENING    AZATHIOPRINE (IMURAN) 50 MG TAB    Take 1 tablet (50 mg total) by mouth once daily.    BACLOFEN (LIORESAL) 10 MG TABLET    Take 1 tablet (10 mg total) by mouth once daily.    BLOOD SUGAR DIAGNOSTIC (TRUE METRIX GLUCOSE TEST STRIP) STRP    Use with blood glucose meter kit to test blood sugar four times  daily    BLOOD-GLUCOSE METER (TRUE METRIX AIR GLUCOSE METER) KIT    TEST FOUR TIMES DAILY BEFORE MEALS  AND EVERY NIGHT    COLCRYS 0.6 MG TABLET    TAKE 1 TABLET EVERY DAY    DICLOFENAC SODIUM (VOLTAREN) 1 % GEL    Apply 2 g topically 4 (four) times daily.    DOXYCYCLINE (VIBRAMYCIN) 100 MG CAP    Take 1 capsule (100 mg total) by mouth 2 (two) times daily.    ELIQUIS 5 MG TAB    TAKE 1 TABLET TWICE DAILY    FLUTICASONE-SALMETEROL DISKUS INHALER 250-50 MCG     Inhale 1 puff into the lungs 2 (two) times daily. Controller    FOOD SUPPLEMT, LACTOSE-REDUCED (ENSURE) LIQD    Take 118 mLs by mouth 3 (three) times daily with meals.    FUROSEMIDE (LASIX) 40 MG TABLET    TAKE 2 TABLETS EVERY MORNING  AND TAKE 1 TABLET EVERY EVENING    GABAPENTIN (NEURONTIN) 800 MG TABLET    Take 1 tablet (800 mg total) by mouth 3 (three) times daily.    GLIMEPIRIDE (AMARYL) 2 MG TABLET    TAKE 1 TABLET (2 MG TOTAL) BY MOUTH BEFORE BREAKFAST.    INHALATION SPACING DEVICE    Use as directed for inhalation.    INSULIN (LANTUS SOLOSTAR U-100 INSULIN) GLARGINE 100 UNITS/ML (3ML) SUBQ PEN    INJECT 35 UNITS SUBCUTANEOUSLY EVERY EVENING    INSULIN ASPART U-100 (NOVOLOG FLEXPEN U-100 INSULIN) 100 UNIT/ML (3 ML) INPN PEN    INJECT 10 UNITS SUBCUTANEOUSLY THREE TIMES DAILY WITH MEALS    INSULIN SYRINGE-NEEDLE U-100 1 ML 31 GAUGE X 5/16 SYRG        IPRATROPIUM (ATROVENT) 0.02 % NEBULIZER SOLUTION    USE 1 VIAL VIA NEBULIZER FOUR TIMES DAILY    ISOSORBIDE MONONITRATE (IMDUR) 60 MG 24 HR TABLET    Take 1 tablet (60 mg total) by mouth once daily.    LANCETS 32 GAUGE MISC    1 lancet by Misc.(Non-Drug; Combo Route) route 2 (two) times daily.    LATANOPROST 0.005 % OPHTHALMIC SOLUTION    INSTILL 1 DROP INTO BOTH EYES EVERY EVENING    MAGNESIUM OXIDE (MAG-OX) 400 MG (241.3 MG MAGNESIUM) TABLET    Take 1 tablet (400 mg total) by mouth once daily.    METFORMIN (GLUCOPHAGE-XR) 500 MG XR 24HR TABLET    Take 2 tablets (1,000 mg total) by mouth 2 (two) times daily with meals.    METOPROLOL SUCCINATE (TOPROL-XL) 50 MG 24 HR TABLET    Take 1 tablet (50 mg total) by mouth once daily.    NITROGLYCERIN (NITROSTAT) 0.3 MG SL TABLET    PLACE 1 TABLET UNDER THE TONGUE EVERY 5 MINUTES AS NEEDED FOR CHEST PAIN, NO MORE THAN 3 TABLETS IN ONE DAY    OZEMPIC 1 MG/DOSE (2 MG/1.5 ML) PNIJ    Inject 0.75 mLs into the skin once a week.    PANTOPRAZOLE (PROTONIX) 40 MG TABLET    Take 1 tablet (40 mg total) by mouth once daily.    PEN  "NEEDLE, DIABETIC (BD ULTRA-FINE SHORT PEN NEEDLE) 31 GAUGE X 5/16" NDLE    Inject 1 each into the skin 3 (three) times daily.    PREDNISONE (DELTASONE) 5 MG TABLET    Take 1 tablet (5 mg total) by mouth 2 (two) times daily.    SERTRALINE (ZOLOFT) 100 MG TABLET    Take 1 tablet (100 mg total) by mouth every evening.    SILVER SULFADIAZINE 1% (SILVADENE) 1 % CREAM    Apply topically 2 (two) times daily.    TAMSULOSIN (FLOMAX) 0.4 MG CAP    Take 1 capsule (0.4 mg total) by mouth once daily.    TIOTROPIUM (SPIRIVA WITH HANDIHALER) 18 MCG INHALATION CAPSULE    Inhale 1 capsule (18 mcg total) into the lungs once daily. Controller    TRAZODONE (DESYREL) 100 MG TABLET    Take 2 tablets (200 mg total) by mouth every evening.    VIOS AEROSOL DELIVERY SYSTEM VIN    USE AS DIRESTED   Changed and/or Refilled Medications    Modified Medication Previous Medication    HYDROCODONE-ACETAMINOPHEN (NORCO)  MG PER TABLET HYDROcodone-acetaminophen (NORCO)  mg per tablet       Take 1 tablet by mouth every 8 (eight) hours as needed for Pain.    Take 1 tablet by mouth every 8 (eight) hours as needed for Pain.       Review of patient's allergies indicates:   Allergen Reactions    Protamine Hives     Urticaria, possible upper airway swelling       Past Surgical History:   Procedure Laterality Date    ABLATION OF ARRHYTHMOGENIC FOCUS FOR ATRIAL FIBRILLATION N/A 2/27/2020    Procedure: Ablation atrial fibrillation;  Surgeon: Gio Brown MD;  Location: [facility] EP LAB;  Service: Cardiology;  Laterality: N/A;  afib, DAMIEN (cx if SR), PVI, PITO, anes, MB, 3 Prep    CHOLECYSTECTOMY      CLOSURE OF WOUND Left 7/1/2020    Procedure: CLOSURE, WOUND;  Surgeon: Barb Veras DPM;  Location: [facility] OR;  Service: Podiatry;  Laterality: Left;    SELECTIVE INJECTION OF ANESTHETIC AGENT AROUND LUMBAR SPINAL NERVE ROOT BY TRANSFORAMINAL APPROACH Left 6/11/2020    Procedure: BLOCK, SPINAL NERVE ROOT, LUMBAR, SELECTIVE, TRANSFORAMINAL " "APPROACH Left L4-5, L5-S1 TFESI with RN IV sedation;  Surgeon: Dillan Tsai MD;  [facility];  Service: Pain Management;  Laterality: Left;    TOE AMPUTATION Left 6/29/2020    Procedure: AMPUTATION, TOE;  Surgeon: Barb Veras DPM;  [facility] OR;  Service: Podiatry;  Laterality: Left;  great toe       Family History   Problem Relation Age of Onset    Glaucoma Mother     Cataracts Mother     Cataracts Father     Glaucoma Sister     Cataracts Sister     Glaucoma Maternal Aunt     No Known Problems Brother     No Known Problems Daughter     No Known Problems Son     Prostate cancer Neg Hx        Social History     Socioeconomic History    Marital status: Legally      Spouse name: Not on file    Number of children: 5    Years of education: Not on file    Highest education level: Not on file   Occupational History    Occupation: disabled   Social Needs    Financial resource strain: Very hard    Food insecurity     Worry: Often true     Inability: Often true    Transportation needs     Medical: Yes     Non-medical: Yes   Tobacco Use    Smoking status: Never Smoker    Smokeless tobacco: Never Used   Substance and Sexual Activity    Alcohol use: Not Currently    Drug use: No    Sexual activity: Yes     Partners: Female   Lifestyle    Physical activity     Days per week: 0 days     Minutes per session: 0 min    Stress: Rather much   Relationships    Social connections     Talks on phone: Three times a week     Gets together: Never     Attends [employer] service: More than 4 times per year     Active member of club or organization: No     Attends meetings of clubs or organizations: Never     Relationship status:    Other Topics Concern    Not on file   Social History Narrative    Utilizing [employer] transportation which has been unreliable.        Vitals:    10/07/20 1301   BP: 136/82   Pulse: 93   Weight: 86.6 kg (191 lb)   Height: 5' 5" (1.651 m)   PainSc:   7 "   PainLoc: Foot       Review of Systems   Constitutional: Negative for chills and fever.   Respiratory: Negative for shortness of breath.    Cardiovascular: Negative for chest pain, palpitations, orthopnea, claudication and leg swelling.   Gastrointestinal: Negative for diarrhea, nausea and vomiting.   Musculoskeletal: Negative for joint pain.   Skin: Negative for rash.   Neurological: Positive for tingling and sensory change.   Psychiatric/Behavioral: Negative.          Objective:      Physical examination: General: Patient is in no acute distress, alert and oriented x 3.  Dressing to left foot clean, dry, and intact.   Patient presents ambulating in surgical shoes with walker.    Lower Extremity Exam:  Vascular: Dorsalis pedis and Posterior tibial pulses palpable on left foot.  Capillary fill time <3 sec to toes on left foot. Mild edema noted on left foot. (--) lymphangitis or (--) cellulitis noted left.  DERMATOLOGICAL: (+) edema, (--) erythema, (--) malodor, (+) serous drainage, (--) warmth to left foot. Open amputation wound dehisce with fibrotic base and with a 1 mm yellow/white border and hyperkeratosis surrounding ulcer. Ulcer measurement (post) 0.8cm x 0.5cm x 0.2cm.  The ulcer does not extend into deeper tissue and (--) sinus tracts exist.  Ulcer noted to left medial 1st metatarsal head  with granular base and with a 1 mm yellow/white border and hyperkeratosis surrounding ulcer. Ulcer measurement (post) 0.03cm x 0.2cm x 0.1cm.  The ulcer does not extend into deeper tissue and (--) sinus tracts exist. The dorsum surface of the feet are soft and supple.  The plantar aspects of feet are dry and scaly. Webspaces 2,3,4 clean, dry and without evidence of break in skin integrity left.   NEUROLOGICAL: Light touch, sharp-dull, proprioception all present and equal bilaterally. Protective sensation absent sites as tested with a [place]-Kimmie 5.07 monofilament.   MUSCULOSKELETAL: Muscle strength is 5/5 for foot  inverters, everters, plantarflexors, and dorsiflexors. Muscle tone is normal. Left hallux amputation noted.           Assessment:       Encounter Diagnoses   Name Primary?   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  Dispense: 20 tablet; Refill: 0    Dehiscence of amputation stump - Left Foot  -     HYDROcodone-acetaminophen (NORCO)  mg per tablet; Take 1 tablet by mouth every 8 (eight) hours as needed for Pain.  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  Dispense: 20 tablet; Refill: 0    Acute post-operative pain - Left Foot  -     HYDROcodone-acetaminophen (NORCO)  mg per tablet; Take 1 tablet by mouth every 8 (eight) hours as needed for Pain.  Dispense: 20 tablet; Refill: 0      I counseled the patient on his conditions, regarding findings of my examination, my impressions, and usual treatment plan.   Left medial 1st metatarsal ulcer sharply excisionally debrided of viable and nonviable tissue from the wound periphery and base. I redefined the wound borders in the process. Debridement was carried into and did include the subcutaneous layer down to good bleeding granular tissue base utilizing sterile #15 blade and tissue nipper and forceps. Wound was irrigated with sterile saline and bleeding was controlled with direct pressure. Minimal blood loss.  Post debridement measurements are contained in the above progress note. The patient tolerated this well. Wound was then dressed with Sarah and  dressed with gauze, kerlix, and ACE.   Patient instructed to keep dressing dry, clean and intact until home health comes out.   Home health to change dressing on Fridays and Mondays. Subsequent home health orders completed.   Patient instructed to continue to ambulate in surgical shoe and walker.   Refill completed for Norco 10-325mg to be taken as needed for pain. Patient advised on the possible elevation of blood pressure or heart effects and caution to take pills as needed and to discontinue use if symptoms arise, patient agreed.   Patient should call the clinic immediately if any signs of infection such as fever chills sweats increased redness or pain.  Patient to return in 1 week or sooner if needed.                 Barb Veras DPM  [facility]

## 2020-10-07 NOTE — PROGRESS NOTES
Outpatient Care Management   - Care Plan Follow Up    Patient: Crow Green  MRN:  [id number]  Date of Service:  10/7/2020  Completed by:  Amanda Whipple LCSW  Referral Date: 10/01/2020  Program: Case Management (High Risk)    Reason for Visit   Patient presents with    Update Plan Of Care     10/7/20    Case Closure     10/7/20       Brief Summary: Phoned patient. Reviewed contact with [facility]. Funds are depleted this year, he is on waiting list for next year when funds are available.   Patient expressed understanding. Encouraged patient to call if needs arise.   Case closure. In basket message to O[facility] RN.     Complex Care Plan     Care plan was discussed and completed today with input from patient and/or caregiver.    Patient Instructions       No follow-ups on file.    Todays [facility] Self-Management Care Plan was developed with the patients/caregivers input and was based on identified barriers from todays assessment.  Goals were written today with the patient/caregiver and the patient has agreed to work towards these goals to improve his/her overall well-being. Patient verbalized understanding of the care plan, goals, and all of today's instructions. Encouraged patient/caregiver to communicate with his/her physician and health care team about health conditions and the treatment plan.  Provided my contact information today and encouraged patient/caregiver to call me with any questions as needed.

## 2020-10-08 ENCOUNTER — TELEPHONE (OUTPATIENT)
Dept: ENDOSCOPY | Facility: HOSPITAL | Age: 65
End: 2020-10-08

## 2020-10-08 NOTE — TELEPHONE ENCOUNTER
Attempted to schedule procedure. Pt has some health issues that need his attention first. ricardo.

## 2020-10-09 ENCOUNTER — OUTPATIENT CASE MANAGEMENT (OUTPATIENT)
Dept: ADMINISTRATIVE | Facility: OTHER | Age: 65
End: 2020-10-09

## 2020-10-09 ENCOUNTER — DOCUMENT SCAN (OUTPATIENT)
Dept: HOME HEALTH SERVICES | Facility: HOSPITAL | Age: 65
End: 2020-10-09
Payer: MEDICARE

## 2020-10-09 DIAGNOSIS — Z12.11 COLON CANCER SCREENING: Primary | ICD-10-CM

## 2020-10-09 NOTE — PROGRESS NOTES
Outpatient Care Management  Plan of Care Follow Up Visit    Patient: Crow Green  MRN: [id number]  Date of Service: 10/09/2020  Completed by: Dang Clay RN  Referral Date: 10/01/2020  Program: Case Management (High Risk)    No chief complaint on file.      Brief Summary: Phone contact today for follow up and d/c from [facility]. Reviewed remaining care plan tasks. Pt continues to have home health SN services for application of wound vac to R toe amputation site that is not healing well. Reviewed upcoming provider appts and he voiced awareness of appts with plans to attend. Advised this CM will d/c from [facility] today related to goals met and encouraged him to attend all scheduled appts and promptly report or seek treatment for any symptoms causing concern. He voiced understanding and agreement with plan for d/c from [facility].    Patient Summary     Involvement of Care:  Do I have permission to speak with other family members about your care?   Yes, relative, Erica    Patient Reported Labs & Vitals:  1.  Any Patient Reported Labs & Vitals?   No  2.  Patient Reported Blood Pressure:     3.  Patient Reported Pulse:     4.  Patient Reported Weight (Kg):     5.  Patient Reported Blood Glucose (mg/dl):       Medical and social history was reviewed with patient and/or caregiver.     Clinical Assessment     Reviewed and provided basic information on available community resources for mental health, transportation, wellness resources, and palliative care programs with patient and/or caregiver.     Complex Care Plan     Care plan was discussed and completed today with input from patient and/or caregiver.    Patient Instructions     Instructions were provided via the [employer] patient resources and are available for the patient to view on the patient portal.    Next Steps: D/C from [facility] today. Dang Clay RN    No follow-ups on file.    Todays [facility] Self-Management Care Plan was developed with the patients/caregivers input  and was based on identified barriers from todays assessment.  Goals were written today with the patient/caregiver and the patient has agreed to work towards these goals to improve his/her overall well-being. Patient verbalized understanding of the care plan, goals, and all of today's instructions. Encouraged patient/caregiver to communicate with his/her physician and health care team about health conditions and the treatment plan.  Provided my contact information today and encouraged patient/caregiver to call me with any questions as needed.

## 2020-10-12 ENCOUNTER — OUTPATIENT CASE MANAGEMENT (OUTPATIENT)
Dept: ADMINISTRATIVE | Facility: OTHER | Age: 65
End: 2020-10-12

## 2020-10-12 ENCOUNTER — TELEPHONE (OUTPATIENT)
Dept: PRIMARY CARE CLINIC | Facility: CLINIC | Age: 65
End: 2020-10-12

## 2020-10-12 ENCOUNTER — TELEPHONE (OUTPATIENT)
Dept: INTERNAL MEDICINE | Facility: CLINIC | Age: 65
End: 2020-10-12

## 2020-10-12 NOTE — PROGRESS NOTES
Outpatient Care Management  Initial Patient Assessment    Patient: Crow Green  MRN: [id number]  Date of Service: 10/12/2020  Completed by: Dang Clay RN  Referral Date: 10/01/2020  Program: Case Management (High Risk)    Reason for Visit   Patient presents with    Case Closure       Brief Summary: Case just closed on 10/9/20.     Assessment Documentation     OP[facility] Initial Assessment    Involvement of Care  Identified Areas of Need  *Active medication list was reviewed and reconciled with patient and/or caregiver:           Problem List and History         Reviewed medical and social history with patient and/or caregiver. A complex care plan was discussed and completed today, with input from patient and/or caregiver.    Patient Instructions     Instructions were provided via the [employer] patient [employer] and are available for the patient to view on the patient portal, if active.    Next steps: Will make no contact attempts at this time as pt just d/c'd from [facility] on 10/9/20. Dang Clay RN    No follow-ups on file.    Todays [facility] Self-Management Care Plan was developed with the patients/caregivers input and was based on identified barriers from todays assessment.  Goals were written today with the patient/caregiver and the patient has agreed to work towards these goals to improve his/her overall well-being. Patient verbalized understanding of the care plan, goals, and all of today's instructions. Encouraged patient/caregiver to communicate with his/her physician and health care team about health conditions and the treatment plan.  Provided my contact information today and encouraged patient/caregiver to call me with any questions as needed.

## 2020-10-12 NOTE — PROGRESS NOTES
Mr. Green returned CHW's call.  CHW reminded patient of his appointment tomorrow with Awilda Nogueira LCSW. Patient confirmed the appointment.

## 2020-10-12 NOTE — TELEPHONE ENCOUNTER
----- Message from Sarahy Ochoa sent at 10/12/2020  9:58 AM CDT -----  Regarding: med advice  .Type:  Needs Medical Advice    Who Called:  pt  Symptoms (please be specific):  rash on body return    How long has patient had these symptoms:     Pharmacy name and phone #:          [id number] - [place] - [address] AT [address] [facility]  [address] [place] [address]  Phone: [phone] Fax: [phone]      Would the patient rather a call back or a response via [facility]?  Call back   Best Call Back Number:  [phone]  Additional Information:

## 2020-10-13 ENCOUNTER — CLINICAL SUPPORT (OUTPATIENT)
Dept: PRIMARY CARE CLINIC | Facility: CLINIC | Age: 65
End: 2020-10-13
Payer: MEDICARE

## 2020-10-13 ENCOUNTER — TELEPHONE (OUTPATIENT)
Dept: INTERNAL MEDICINE | Facility: CLINIC | Age: 65
End: 2020-10-13

## 2020-10-13 DIAGNOSIS — G89.29 OTHER CHRONIC PAIN: ICD-10-CM

## 2020-10-13 DIAGNOSIS — F33.2 SEVERE EPISODE OF RECURRENT MAJOR DEPRESSIVE DISORDER, WITHOUT PSYCHOTIC FEATURES: Primary | ICD-10-CM

## 2020-10-13 DIAGNOSIS — F41.1 GAD (GENERALIZED ANXIETY DISORDER): ICD-10-CM

## 2020-10-13 PROCEDURE — 99499 RISK ADDL DX/OHS AUDIT: ICD-10-PCS | Mod: BHI,95,, | Performed by: INTERNAL MEDICINE

## 2020-10-13 PROCEDURE — 90837 PSYTX W PT 60 MINUTES: CPT | Mod: BHI,95,, | Performed by: SOCIAL WORKER

## 2020-10-13 PROCEDURE — 99499 UNLISTED E&M SERVICE: CPT | Mod: BHI,95,, | Performed by: SOCIAL WORKER

## 2020-10-13 PROCEDURE — 90837 PR PSYCHOTHERAPY W/PATIENT, 60 MIN: ICD-10-PCS | Mod: BHI,95,, | Performed by: SOCIAL WORKER

## 2020-10-13 PROCEDURE — 99499 UNLISTED E&M SERVICE: CPT | Mod: BHI,95,, | Performed by: INTERNAL MEDICINE

## 2020-10-13 PROCEDURE — 99499 NO LOS: ICD-10-PCS | Mod: BHI,95,, | Performed by: SOCIAL WORKER

## 2020-10-13 NOTE — TELEPHONE ENCOUNTER
----- Message from Skylar Whitt sent at 10/13/2020  9:03 AM CDT -----  Please call pt @ [phone], pt states he left messages before, pt still with to speak with nurse

## 2020-10-13 NOTE — PROGRESS NOTES
[facility] INTEGRATION INTAKE    DATE:  10/15/2020  REFFERAL SOURCE:  Mateo Lambert DO  TYPE OF VISIT:  Telephone Session  LENGTH OF SESSION:  60    The patient location is: Pt Home  The chief complaint leading to consultation is: Depression, Anxiety, and Chronic Pain    Visit type: audio only    Face to Face time with patient: 0  60 minutes of total time spent on the encounter, which includes face to face time and non-face to face time preparing to see the patient (eg, review of tests), Obtaining and/or reviewing separately obtained history, Documenting clinical information in the electronic or other health record, Independently interpreting results (not separately reported) and communicating results to the patient/family/caregiver, or Care coordination (not separately reported).         Each patient to whom he or she provides medical services by telemedicine is:  (1) informed of the relationship between the physician and patient and the respective role of any other health care provider with respect to management of the patient; and (2) notified that he or she may decline to receive medical services by telemedicine and may withdraw from such care at any time.    HISTORY OF PRESENTING ILLNESS:  Pt presented for initial evaluation for the \[facility]\"" [facility]al [facility] Integration Program. Met with patient. Pt's chief complaint includes the following: depression, anxiety and sleep.    LCSW and patient had initial psychotherapy session this date. Patient open to communication and actively participated in completing initial assessment. Pt reports multiple current stressors he is currently experiencing. Major pain in foot, leg, and back. Reports he recently had big toe amputated and is having difficulty with ambulation and chronic pain. He states his PCP is working to get pt an electric wheelchair and replace his Rolator. While he is looking forward to the electric wheelchair, he would like to work  on mobility, balance and walking. Discussed his desire to receive physical therapy through home health as transportation is a major issue. Regarding mental health, he feels he has minimal support and his complex physical concerns greatly impact his depression and anxiety. Hs participated in psychotherapy in the past and found interventions beneficial.     Patient does not currently have a psychiatrist.   Patient does not currently have a therapist.    Patient was at [facility] but due to toe amputation, and complex physical concerns, he stopped going 5-6 months ago.   Patient is currently taking Zoloft 100 mg nightly and Trazodone 200 mg nightly. They are interested in medication changes.  Patient is currently taking Hydrocodone 10 mg every 8 hours and Gabapentin 800 mg 3 times daily for chronic pain.    Current symptoms:  · Depression: insomnia, worthlessness/guilt, hopelessness, fatigue and difficulty concentrating.  · Anxiety: excessive worrying, restlessness and muscle tension.  · Insomnia: early morning awakening and frequent night time awakening.  · Amara:  denies.  · Psychosis: denies .    PHQ9 10/5/2020   Total Score 25     GAD7 10/5/2020   1. Feeling nervous, anxious, or on edge? 3   2. Not being able to stop or control worrying? 2   3. Worrying too much about different things? 3   4. Trouble relaxing? 3   5. Being so restless that it is hard to sit still? 3   6. Becoming easily annoyed or irritable? 3   7. Feeling afraid as if something awful might happen? 2   8. If you checked off any problems, how difficult have these problems made it for you to do your work, take care of things at home, or get along with other people? 1   DOMINIK-7 Score 19        Risk assessment:  Patient reports no suicidal ideation  Patient reports no homicidal ideation  Patient reports no self-injurious behavior  Patient reports no violent behavior    PSYCHIATRIC HISTORY:  History of Amara or diagnosis of Bipolar Disorder  in the past:  No  History of Psychosis or diagnosis of Schizophrenia in the past:  No  Previous Psychiatric Hospitalizations:  No  Previous SI/HI:   No  Previous Suicide Attempts:  No  Previous Medication Trials: Yes - Unable to state any names of the medications he is on  Previous Psychiatric Outpatient Treatment:  Yes - [facility]  History of Trauma:  No  History of Violence:  Yes  Access to a Gun:  No    SUBSTANCE ABUSE HISTORY:  Tobacco:  No  Alcohol:  No  Illicit Substances: Yes - Clean for 21 years. (Cocaine abuse)  Misuse of Prescription Medications:  No    CHRONIC PAIN TREATMENT HISTORY:  Recently had toe amputated. Has participated in physical and occupational therapy.     COMM-9 Assessment  In the past 30 days, how often have you had trouble with thinking clearly or had memory problems?: Often  In the past 30 days, how often have you had to go to someone other than your prescribing physician to get sufficient pain relief from your medications? (i.e., another doctor, the emergency room): Never  In the past 30 days, how often have you seriously thought about hurting yourself?: Never  In the past 30 days, how much of your time was spent thinking about opioid medications (having enough, taking them, dosing schedule, etc.)?: Never  In the past 30 days, how often have you needed to take pain medications belonging to someone else?: Never  In the past 30 days, how often have you gotten angry with people?: Sometimes  In the past 30 days, how often have you had to take more of your medication than prescribed?: Never  In the past 30 days, how often have you used your pain medicine for symptoms other than for pain (e.g., to help you sleep, improve your mood, or relieve stress)?: Never  In the past 30 days, how often have you had to visit the emergency room?: Never  Total Score: 5     PEG-3 Assessment 10/15/2020   What number best described your pain on average in the past week? 8   What number best  describes how, during the past week, pain has interfered with your enjoyment of life? 9   What number best describes how, during the past week, pain has interfered with your general activity? 9   Score 26       MEDICAL HISTORY:  Past Medical History:   Diagnosis Date    Arthritis     Asthma     Atrial fibrillation     Atrial fibrillation with RVR 8/7/2019    CHF (congestive heart failure)     Chronic obstructive pulmonary disease 8/5/2020    Depression     Dermatomyositis     Diabetes mellitus, type 2 Diagnosed in 2000    Elevated PSA     Follow up 7/30/2020    Hypertension     Sleep apnea        NEUROLOGIC HISTORY:  Seizures:  No  Head trauma:  No  Memory loss:  No    SOCIAL HISTORY:  Living Situation: Pt lives with his father who  had a stroke and patient attempts to help provide care. Having difficulty with household responsibilities and cooking. Does state household receives meals on wheels.   Family: Has 5 children, pt reports they do not provide any assistance and he feels they are not supportive.  Nuclear/Marriage: Not   Extended Family: Patient has multiple siblings who live locally.    Supports: Minimal support reported by patient. Feels his siblings and children do not provide support for patient or pt's father.  Education/Vocation: Pt works at the [employer] at [employer]. 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   Yazidi/Spirituality: Reports strong natasha  Hobbies and Interests: Being outside, spending time with his son who lives in     PSYCHIATRIC FAMILY HISTORY:  None reported    MENTAL HEALTH STATUS EXAM:  General Appearance:  unremarkable, age appropriate   Speech: normal tone, normal rate, normal pitch, normal volume      Level of Cooperation: cooperative      Thought Processes: normal and logical, goal-directed, logical   Mood: steady      Thought Content: normal, no suicidality, no homicidality, delusions, or paranoia   Affect:  congruent and appropriate   Orientation: Oriented x3   Memory: recent >  intact   Attention Span & Concentration: intact   Fund of General Knowledge: intact and appropriate to age and level of education   Abstract Reasoning: interpretation of similarities was concrete   Judgment & Insight: good     Language  intact       IMPRESSION:   My diagnostic impression is MDD, severe, without psychotic features and DOMINIK    PROVISIONAL DIAGNOSES:  1. Severe episode of recurrent major depressive disorder, without psychotic features    2. DOMINIK (generalized anxiety disorder)    3. Other chronic pain         TREATMENT GOALS:   Mental:  More  Physical:  Begin physical therapy and work on balance and overall mobility.     PLAN:  LCSW and PT will meet in 2 weeks. In this session a psych evaluation was conducted to get history and process Pt's life. CBT, ACT, and mindfulness techniques will be utilized in future psychotherapy sessions to address depression and anxiety concerns.    RETURN TO CLINIC: Follow up in about 2 weeks (around 10/27/2020), or if symptoms worsen or fail to improve.

## 2020-10-13 NOTE — LETTER
October 15, 2020      Mateo Lambert,   [address] [address] [address]           [address] [facility]  [address] [facility] [address]  Phone: [phone]  Fax: [phone]          Patient: Crow Green   MR Number: [id number]   YOB: 1955   Date of Visit: 10/13/2020       Dear Dr. Mateo Lambert:    Thank you for referring Crow Green to me for evaluation. Attached you will find relevant portions of my assessment and plan of care.    If you have questions, please do not hesitate to call me. I look forward to following Crow Green along with you.    Sincerely,    Awilda Nogueira, [facility]    Enclosure  CC:  No Recipients    If you would like to receive this communication electronically, please contact [email or web address] or [phone] to request more information on [employer] access.    For providers and/or their staff who would like to refer a patient to [facility], please contact us through our one-stop-shop provider referral line, [facility], at [phone].    If you feel you have received this communication in error or would no longer like to receive these types of communications, please e-mail [email or web address]

## 2020-10-13 NOTE — TELEPHONE ENCOUNTER
[employer] states that pt will need a misc DME order signed and stating in comments:  power wheelchair   batteries-2 each  Adjustable armrest-2 each     Could not find misc DME order to pend

## 2020-10-14 ENCOUNTER — OFFICE VISIT (OUTPATIENT)
Dept: PODIATRY | Facility: CLINIC | Age: 65
End: 2020-10-14
Payer: MEDICARE

## 2020-10-14 VITALS
WEIGHT: 191 LBS | SYSTOLIC BLOOD PRESSURE: 106 MMHG | HEART RATE: 99 BPM | BODY MASS INDEX: 31.82 KG/M2 | DIASTOLIC BLOOD PRESSURE: 67 MMHG | HEIGHT: 65 IN

## 2020-10-14 DIAGNOSIS — L97.522 ULCER OF LEFT FOOT, WITH FAT LAYER EXPOSED: Primary | ICD-10-CM

## 2020-10-14 DIAGNOSIS — E11.49 TYPE II DIABETES MELLITUS WITH NEUROLOGICAL MANIFESTATIONS: ICD-10-CM

## 2020-10-14 DIAGNOSIS — T87.81 DEHISCENCE OF AMPUTATION STUMP: ICD-10-CM

## 2020-10-14 DIAGNOSIS — G89.18 ACUTE POST-OPERATIVE PAIN: ICD-10-CM

## 2020-10-14 DIAGNOSIS — Z89.412 STATUS POST AMPUTATION OF LEFT GREAT TOE: ICD-10-CM

## 2020-10-14 PROCEDURE — 99024 PR POST-OP FOLLOW-UP VISIT: ICD-10-PCS | Mod: HCNC,S$GLB,, | Performed by: PODIATRIST

## 2020-10-14 PROCEDURE — 99999 PR PBB SHADOW E&M-EST. PATIENT-LVL V: CPT | Mod: PBBFAC,HCNC,, | Performed by: PODIATRIST

## 2020-10-14 PROCEDURE — 99999 PR PBB SHADOW E&M-EST. PATIENT-LVL V: ICD-10-PCS | Mod: PBBFAC,HCNC,, | Performed by: PODIATRIST

## 2020-10-14 PROCEDURE — 99024 POSTOP FOLLOW-UP VISIT: CPT | Mod: HCNC,S$GLB,, | Performed by: PODIATRIST

## 2020-10-14 RX ORDER — HYDROCODONE BITARTRATE AND ACETAMINOPHEN 10; 325 MG/1; MG/1
1 TABLET ORAL EVERY 8 HOURS PRN
Qty: 20 TABLET | Refills: 0 | Status: SHIPPED | OUTPATIENT
Start: 2020-10-15 | End: 2020-10-21 | Stop reason: SDUPTHER

## 2020-10-14 NOTE — PROGRESS NOTES
Subjective:     Patient ID: Crow Green is a 65 y.o. male.    Chief Complaint: Wound Care (rates pain 8/10. wears post op shoes with dressing. PCP Dr. Lambert.)    HPI: This 65 year old male returns to the clinic 3 months status post left hallux amputation procedure. Patient has no complaints of fever chills or sweats. Positive pain, rates pain 8/10. Patient states dressing was kept dry, clean, and intact. Patient states home health has been coming out.  Patient states he does try to take the Tylenol instead of the Norco, but he ran out of Tylenol. Patient has no other pedal complaints today      Patient Active Problem List   Diagnosis    ED (erectile dysfunction)    Non-proliferative diabetic retinopathy, mild, both eyes    Hypertension associated with diabetes    Diabetic polyneuropathy    Nonobstructive coronary artery disease of native artery of native heart    Chronic combined systolic and diastolic HFrEF of 45%    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, with long-term current use of insulin    SANDRITA on CPAP    Moderate asthma    Psychophysiological insomnia    Nightmares    Sleep related gastroesophageal reflux disease    Inadequate sleep hygiene    PAF (paroxysmal atrial fibrillation)    At risk for medication noncompliance    Obesity (BMI 30.0-34.9)    Indeterminate stage chronic open angle glaucoma    Right hip pain    Gait instability    Chronic right shoulder pain    Arthritis    Left sided sciatica    Dyspnea on exertion    Osteoarthritis of spine with radiculopathy, lumbar region    HNP (herniated nucleus pulposus), lumbar    Gastroesophageal reflux disease without esophagitis    Chronic diastolic heart failure    Hypogonadism in male    Disorder of parathyroid gland    Hyperlipidemia associated with type 2 diabetes mellitus    JOSE MARIA (acute kidney injury)    LRTI (lower respiratory tract infection)     Physical deconditioning    Acute pain of left shoulder    Traumatic tear of left rotator cuff    Atrial fibrillation with RVR    Other chronic pain    Left shoulder pain    Complete tear of left rotator cuff    Stage 3 chronic kidney disease    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 associated with type 2 diabetes mellitus    Lumbar radiculopathy    Diabetic ulcer of toe of left foot associated with type 2 diabetes mellitus, with necrosis of muscle    Ulcer of left foot    Elevated troponin    Hypotension due to medication    Dermatomyositis    Postprocedural hypotension    Follow up    Weakness generalized    Advanced directives, counseling/discussion    Cocaine dependence, in remission    Schizoaffective disorder, depressive type    Chronic obstructive pulmonary disease       Medication List with Changes/Refills   Current Medications    ALCOHOL SWABS (ALCOHOL PREP PADS) PADM    Apply 1 each topically once daily.    ALLOPURINOL (ZYLOPRIM) 100 MG TABLET    Take 1 tablet (100 mg total) by mouth once daily.    ASPIRIN (ECOTRIN) 81 MG EC TABLET    Take 1 tablet (81 mg total) by mouth once daily.    ATORVASTATIN (LIPITOR) 40 MG TABLET    TAKE 1 TABLET EVERY EVENING    AZATHIOPRINE (IMURAN) 50 MG TAB    Take 1 tablet (50 mg total) by mouth once daily.    BACLOFEN (LIORESAL) 10 MG TABLET    Take 1 tablet (10 mg total) by mouth once daily.    BLOOD SUGAR DIAGNOSTIC (TRUE METRIX GLUCOSE TEST STRIP) STRP    Use with blood glucose meter kit to test blood sugar four times  daily    BLOOD-GLUCOSE METER (TRUE METRIX AIR GLUCOSE METER) KIT    TEST FOUR TIMES DAILY BEFORE MEALS  AND EVERY NIGHT    COLCRYS 0.6 MG TABLET    TAKE 1 TABLET EVERY DAY    DICLOFENAC SODIUM (VOLTAREN) 1 % GEL    Apply 2 g topically 4 (four) times daily.    DOXYCYCLINE (VIBRAMYCIN) 100 MG CAP    Take 1 capsule (100 mg total) by mouth 2 (two) times daily.    ELIQUIS 5 MG TAB    TAKE 1 TABLET TWICE  DAILY    FLUTICASONE-SALMETEROL DISKUS INHALER 250-50 MCG    Inhale 1 puff into the lungs 2 (two) times daily. Controller    FOOD SUPPLEMT, LACTOSE-REDUCED (ENSURE) LIQD    Take 118 mLs by mouth 3 (three) times daily with meals.    FUROSEMIDE (LASIX) 40 MG TABLET    TAKE 2 TABLETS EVERY MORNING  AND TAKE 1 TABLET EVERY EVENING    GABAPENTIN (NEURONTIN) 800 MG TABLET    Take 1 tablet (800 mg total) by mouth 3 (three) times daily.    GLIMEPIRIDE (AMARYL) 2 MG TABLET    TAKE 1 TABLET (2 MG TOTAL) BY MOUTH BEFORE BREAKFAST.    INHALATION SPACING DEVICE    Use as directed for inhalation.    INSULIN (LANTUS SOLOSTAR U-100 INSULIN) GLARGINE 100 UNITS/ML (3ML) SUBQ PEN    INJECT 35 UNITS SUBCUTANEOUSLY EVERY EVENING    INSULIN ASPART U-100 (NOVOLOG FLEXPEN U-100 INSULIN) 100 UNIT/ML (3 ML) INPN PEN    INJECT 10 UNITS SUBCUTANEOUSLY THREE TIMES DAILY WITH MEALS    INSULIN SYRINGE-NEEDLE U-100 1 ML 31 GAUGE X 5/16 SYRG        IPRATROPIUM (ATROVENT) 0.02 % NEBULIZER SOLUTION    USE 1 VIAL VIA NEBULIZER FOUR TIMES DAILY    ISOSORBIDE MONONITRATE (IMDUR) 60 MG 24 HR TABLET    Take 1 tablet (60 mg total) by mouth once daily.    LANCETS 32 GAUGE MISC    1 lancet by Misc.(Non-Drug; Combo Route) route 2 (two) times daily.    LATANOPROST 0.005 % OPHTHALMIC SOLUTION    INSTILL 1 DROP INTO BOTH EYES EVERY EVENING    MAGNESIUM OXIDE (MAG-OX) 400 MG (241.3 MG MAGNESIUM) TABLET    Take 1 tablet (400 mg total) by mouth once daily.    METFORMIN (GLUCOPHAGE-XR) 500 MG XR 24HR TABLET    Take 2 tablets (1,000 mg total) by mouth 2 (two) times daily with meals.    METOPROLOL SUCCINATE (TOPROL-XL) 50 MG 24 HR TABLET    Take 1 tablet (50 mg total) by mouth once daily.    NITROGLYCERIN (NITROSTAT) 0.3 MG SL TABLET    PLACE 1 TABLET UNDER THE TONGUE EVERY 5 MINUTES AS NEEDED FOR CHEST PAIN, NO MORE THAN 3 TABLETS IN ONE DAY    OZEMPIC 1 MG/DOSE (2 MG/1.5 ML) PNIJ    Inject 0.75 mLs into the skin once a week.    PANTOPRAZOLE (PROTONIX) 40 MG TABLET    " Take 1 tablet (40 mg total) by mouth once daily.    PEN NEEDLE, DIABETIC (BD ULTRA-FINE SHORT PEN NEEDLE) 31 GAUGE X 5/16" NDLE    Inject 1 each into the skin 3 (three) times daily.    PREDNISONE (DELTASONE) 5 MG TABLET    Take 1 tablet (5 mg total) by mouth 2 (two) times daily.    SERTRALINE (ZOLOFT) 100 MG TABLET    Take 1 tablet (100 mg total) by mouth every evening.    SILVER SULFADIAZINE 1% (SILVADENE) 1 % CREAM    Apply topically 2 (two) times daily.    TAMSULOSIN (FLOMAX) 0.4 MG CAP    Take 1 capsule (0.4 mg total) by mouth once daily.    TIOTROPIUM (SPIRIVA WITH HANDIHALER) 18 MCG INHALATION CAPSULE    Inhale 1 capsule (18 mcg total) into the lungs once daily. Controller    TRAZODONE (DESYREL) 100 MG TABLET    Take 2 tablets (200 mg total) by mouth every evening.    VIOS AEROSOL DELIVERY SYSTEM VIN    USE AS DIRESTED   Changed and/or Refilled Medications    Modified Medication Previous Medication    HYDROCODONE-ACETAMINOPHEN (NORCO)  MG PER TABLET HYDROcodone-acetaminophen (NORCO)  mg per tablet       Take 1 tablet by mouth every 8 (eight) hours as needed for Pain.    Take 1 tablet by mouth every 8 (eight) hours as needed for Pain.       Review of patient's allergies indicates:   Allergen Reactions    Protamine Hives     Urticaria, possible upper airway swelling       Past Surgical History:   Procedure Laterality Date    ABLATION OF ARRHYTHMOGENIC FOCUS FOR ATRIAL FIBRILLATION N/A 2/27/2020    Procedure: Ablation atrial fibrillation;  Surgeon: Gio Brown MD;  Location: [facility] EP LAB;  Service: Cardiology;  Laterality: N/A;  afib, DAMIEN (cx if SR), PVI, PITO, anes, MB, 3 Prep    CHOLECYSTECTOMY      CLOSURE OF WOUND Left 7/1/2020    Procedure: CLOSURE, WOUND;  Surgeon: Barb Veras DPM;  [facility] OR;  Service: Podiatry;  Laterality: Left;    SELECTIVE INJECTION OF ANESTHETIC AGENT AROUND LUMBAR SPINAL NERVE ROOT BY TRANSFORAMINAL APPROACH Left 6/11/2020    Procedure: BLOCK, " SPINAL NERVE ROOT, LUMBAR, SELECTIVE, TRANSFORAMINAL APPROACH Left L4-5, L5-S1 TFESI with RN IV sedation;  Surgeon: Dillan Tsai MD;  [facility];  Service: Pain Management;  Laterality: Left;    TOE AMPUTATION Left 6/29/2020    Procedure: AMPUTATION, TOE;  Surgeon: Barb Veras DPM;  Location: [facility] OR;  Service: Podiatry;  Laterality: Left;  great toe       Family History   Problem Relation Age of Onset    Glaucoma Mother     Cataracts Mother     Cataracts Father     Glaucoma Sister     Cataracts Sister     Glaucoma Maternal Aunt     No Known Problems Brother     No Known Problems Daughter     No Known Problems Son     Prostate cancer Neg Hx        Social History     Socioeconomic History    Marital status: Legally      Spouse name: Not on file    Number of children: 5    Years of education: Not on file    Highest education level: Not on file   Occupational History    Occupation: disabled   Social Needs    Financial resource strain: Very hard    Food insecurity     Worry: Often true     Inability: Often true    Transportation needs     Medical: Yes     Non-medical: Yes   Tobacco Use    Smoking status: Never Smoker    Smokeless tobacco: Never Used   Substance and Sexual Activity    Alcohol use: Not Currently    Drug use: No    Sexual activity: Yes     Partners: Female   Lifestyle    Physical activity     Days per week: 0 days     Minutes per session: 0 min    Stress: Rather much   Relationships    Social connections     Talks on phone: Three times a week     Gets together: Never     Attends [employer] service: More than 4 times per year     Active member of club or organization: No     Attends meetings of clubs or organizations: Never     Relationship status:    Other Topics Concern    Not on file   Social History Narrative    Utilizing [employer] transportation which has been unreliable.        Vitals:    10/14/20 1318   BP: 106/67   Pulse: 99   Weight: 86.6  "kg (191 lb)   Height: 5' 5" (1.651 m)   PainSc: 0-No pain   PainLoc: Foot       Review of Systems   Constitutional: Negative for chills and fever.   Respiratory: Negative for shortness of breath.    Cardiovascular: Negative for chest pain, palpitations, orthopnea, claudication and leg swelling.   Gastrointestinal: Negative for diarrhea, nausea and vomiting.   Musculoskeletal: Negative for joint pain.   Skin: Negative for rash.   Neurological: Positive for tingling and sensory change.   Psychiatric/Behavioral: Negative.          Objective:      Physical examination: General: Patient is in no acute distress, alert and oriented x 3.  Dressing to left foot clean, dry, and intact.   Patient presents ambulating in surgical shoes.   Lower Extremity Exam:  Vascular: Dorsalis pedis and Posterior tibial pulses palpable on left foot.  Capillary fill time <3 sec to toes on left foot. No edema noted on left foot.  (--) lymphangitis or (--) cellulitis noted left.  DERMATOLOGICAL: (--) edema, (--) erythema, (--) malodor, (+) serosanguinous drainage, (--) warmth to left foot. Ulcer measurement (post) 0.8cm x 0.5cm x 0.1cm.  The ulcer does not extend into deeper tissue and (--) sinus tracts exist.  Ulcer noted to left medial 1st metatarsal head  with granular base and with a 1 mm yellow/white border and hyperkeratosis surrounding ulcer. Ulcer measurement (post) 0.2cm x 0.1cm x 0.1cm.  The ulcer does not extend into deeper tissue and (--) sinus tracts exist. The dorsum surface of the feet are soft and supple.  The plantar aspects of feet are dry and scaly. Webspaces 2,3,4 clean, dry and without evidence of break in skin integrity left.   NEUROLOGICAL: Light touch, sharp-dull, proprioception all present and equal bilaterally. Protective sensation absent sites as tested with a [place]immie 5.07 monofilament.   MUSCULOSKELETAL: Muscle strength is 5/5 for foot inverters, everters, plantarflexors, and dorsiflexors. Muscle tone is " normal. Left hallux amputation noted.               Assessment:       Encounter Diagnoses   Name Primary?   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  Dispense: 20 tablet; Refill: 0    Dehiscence of amputation stump - Left Foot  -     HYDROcodone-acetaminophen (NORCO)  mg per tablet; Take 1 tablet by mouth every 8 (eight) hours as needed for Pain.  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  Dispense: 20 tablet; Refill: 0    Acute post-operative pain - Left Foot  -     HYDROcodone-acetaminophen (NORCO)  mg per tablet; Take 1 tablet by mouth every 8 (eight) hours as needed for Pain.  Dispense: 20 tablet; Refill: 0    Type II diabetes mellitus with neurological manifestations      I counseled the patient on his conditions, regarding findings of my examination, my impressions, and usual treatment plan.   Left medial 1st metatarsal ulcer sharply excisionally debrided of viable and nonviable tissue from the wound periphery and base. I redefined the wound borders in the process. Debridement was carried into and did include the subcutaneous layer down to good bleeding granular tissue base utilizing sterile #15 blade and tissue nipper and forceps. Wound was irrigated with sterile saline and bleeding was controlled with direct pressure. Minimal blood loss.  Post debridement measurements are contained in the above progress note. The patient  tolerated this well. Wound was then dressed with Sarah and dressed with gauze, kerlix, and ACE.   Patient instructed to keep dressing dry, clean and intact until home health comes out.   Home health to change dressing on Fridays and Mondays. Subsequent home health orders completed.   Patient instructed to continue to ambulate in surgical shoe and walker.   Refill completed for Norco 10-325mg to be taken as needed for pain. Patient advised on the possible elevation of blood pressure or heart effects and caution to take pills as needed and to discontinue use if symptoms arise, patient agreed.   Patient should call the clinic immediately if any signs of infection such as fever chills sweats increased redness or pain.  Patient to return in 1 week or sooner if needed.             Barb Veras DPM  [facility]

## 2020-10-15 PROBLEM — F33.1 MDD (MAJOR DEPRESSIVE DISORDER), RECURRENT EPISODE, MODERATE: Status: ACTIVE | Noted: 2020-10-15

## 2020-10-15 PROBLEM — F41.1 GAD (GENERALIZED ANXIETY DISORDER): Status: ACTIVE | Noted: 2020-10-15

## 2020-10-15 PROBLEM — F33.2 SEVERE EPISODE OF RECURRENT MAJOR DEPRESSIVE DISORDER, WITHOUT PSYCHOTIC FEATURES: Status: ACTIVE | Noted: 2020-10-15

## 2020-10-15 NOTE — PROGRESS NOTES
I, Fransisca Amin MD, have reviewed the LCSW's history and exam, and I agree with the assessment and plan.  LCSW will send message to PCP regarding diagnosis of Schizoaffective Disorder in chart.  He will continue individual psychotherapy with [facility] LCSW.    Time: 15 minutes

## 2020-10-16 ENCOUNTER — DOCUMENT SCAN (OUTPATIENT)
Dept: HOME HEALTH SERVICES | Facility: HOSPITAL | Age: 65
End: 2020-10-16
Payer: MEDICARE

## 2020-10-19 ENCOUNTER — TELEPHONE (OUTPATIENT)
Dept: ORTHOPEDICS | Facility: CLINIC | Age: 65
End: 2020-10-19

## 2020-10-19 NOTE — TELEPHONE ENCOUNTER
Called patient to reschedule appointment to Dr. Alvarado's schedule, per Dr. Garcia.  MRI shows massive retracted supra/infra.  Patient agreed, moved appt back 30 mins to [facility] schedule.

## 2020-10-21 ENCOUNTER — OFFICE VISIT (OUTPATIENT)
Dept: DERMATOLOGY | Facility: CLINIC | Age: 65
End: 2020-10-21
Payer: MEDICARE

## 2020-10-21 ENCOUNTER — OFFICE VISIT (OUTPATIENT)
Dept: PODIATRY | Facility: CLINIC | Age: 65
End: 2020-10-21
Payer: MEDICARE

## 2020-10-21 ENCOUNTER — OFFICE VISIT (OUTPATIENT)
Dept: ORTHOPEDICS | Facility: CLINIC | Age: 65
End: 2020-10-21
Payer: MEDICARE

## 2020-10-21 ENCOUNTER — HOSPITAL ENCOUNTER (OUTPATIENT)
Dept: RADIOLOGY | Facility: HOSPITAL | Age: 65
Discharge: HOME | End: 2020-10-21
Attending: ORTHOPAEDIC SURGERY
Payer: MEDICARE

## 2020-10-21 VITALS
DIASTOLIC BLOOD PRESSURE: 91 MMHG | WEIGHT: 190.94 LBS | HEIGHT: 65 IN | HEART RATE: 85 BPM | BODY MASS INDEX: 31.81 KG/M2 | SYSTOLIC BLOOD PRESSURE: 159 MMHG

## 2020-10-21 VITALS
DIASTOLIC BLOOD PRESSURE: 86 MMHG | HEART RATE: 86 BPM | WEIGHT: 191 LBS | HEIGHT: 65 IN | BODY MASS INDEX: 31.82 KG/M2 | SYSTOLIC BLOOD PRESSURE: 150 MMHG

## 2020-10-21 DIAGNOSIS — M71.072 ABSCESS OF BURSA OF LEFT FOOT: ICD-10-CM

## 2020-10-21 DIAGNOSIS — M12.812 ROTATOR CUFF TEAR ARTHROPATHY OF LEFT SHOULDER: ICD-10-CM

## 2020-10-21 DIAGNOSIS — Z12.11 SPECIAL SCREENING FOR MALIGNANT NEOPLASM OF COLON: Primary | ICD-10-CM

## 2020-10-21 DIAGNOSIS — M75.102 ROTATOR CUFF TEAR ARTHROPATHY OF LEFT SHOULDER: ICD-10-CM

## 2020-10-21 DIAGNOSIS — T87.81 DEHISCENCE OF AMPUTATION STUMP: ICD-10-CM

## 2020-10-21 DIAGNOSIS — E11.49 TYPE II DIABETES MELLITUS WITH NEUROLOGICAL MANIFESTATIONS: ICD-10-CM

## 2020-10-21 DIAGNOSIS — L85.3 XEROSIS CUTIS: Primary | ICD-10-CM

## 2020-10-21 DIAGNOSIS — E11.65 UNCONTROLLED TYPE 2 DIABETES MELLITUS WITH HYPERGLYCEMIA: ICD-10-CM

## 2020-10-21 DIAGNOSIS — L97.522 ULCER OF LEFT FOOT, WITH FAT LAYER EXPOSED: Primary | ICD-10-CM

## 2020-10-21 DIAGNOSIS — M25.512 LEFT SHOULDER PAIN, UNSPECIFIED CHRONICITY: ICD-10-CM

## 2020-10-21 DIAGNOSIS — Z89.412 STATUS POST AMPUTATION OF LEFT GREAT TOE: ICD-10-CM

## 2020-10-21 DIAGNOSIS — G89.18 ACUTE POST-OPERATIVE PAIN: ICD-10-CM

## 2020-10-21 DIAGNOSIS — L29.9 PRURITUS: ICD-10-CM

## 2020-10-21 PROCEDURE — 3077F SYST BP >= 140 MM HG: CPT | Mod: HCNC,CPTII,S$GLB, | Performed by: PODIATRIST

## 2020-10-21 PROCEDURE — 3077F PR MOST RECENT SYSTOLIC BLOOD PRESSURE >= 140 MM HG: ICD-10-PCS | Mod: HCNC,CPTII,S$GLB, | Performed by: PODIATRIST

## 2020-10-21 PROCEDURE — 3078F PR MOST RECENT DIASTOLIC BLOOD PRESSURE < 80 MM HG: ICD-10-PCS | Mod: HCNC,CPTII,S$GLB, | Performed by: STUDENT IN AN ORGANIZED HEALTH CARE EDUCATION/TRAINING PROGRAM

## 2020-10-21 PROCEDURE — 20610 DRAIN/INJ JOINT/BURSA W/O US: CPT | Mod: HCNC,LT,S$GLB, | Performed by: STUDENT IN AN ORGANIZED HEALTH CARE EDUCATION/TRAINING PROGRAM

## 2020-10-21 PROCEDURE — 99212 PR OFFICE/OUTPT VISIT, EST, LEVL II, 10-19 MIN: ICD-10-PCS | Mod: HCNC,S$GLB,, | Performed by: PODIATRIST

## 2020-10-21 PROCEDURE — 99999 PR PBB SHADOW E&M-EST. PATIENT-LVL V: CPT | Mod: PBBFAC,HCNC,, | Performed by: STUDENT IN AN ORGANIZED HEALTH CARE EDUCATION/TRAINING PROGRAM

## 2020-10-21 PROCEDURE — 3044F PR MOST RECENT HEMOGLOBIN A1C LEVEL <7.0%: ICD-10-PCS | Mod: HCNC,CPTII,S$GLB, | Performed by: PODIATRIST

## 2020-10-21 PROCEDURE — 99999 PR PBB SHADOW E&M-EST. PATIENT-LVL IV: CPT | Mod: PBBFAC,HCNC,, | Performed by: STUDENT IN AN ORGANIZED HEALTH CARE EDUCATION/TRAINING PROGRAM

## 2020-10-21 PROCEDURE — 3079F PR MOST RECENT DIASTOLIC BLOOD PRESSURE 80-89 MM HG: ICD-10-PCS | Mod: HCNC,CPTII,S$GLB, | Performed by: STUDENT IN AN ORGANIZED HEALTH CARE EDUCATION/TRAINING PROGRAM

## 2020-10-21 PROCEDURE — 99999 PR PBB SHADOW E&M-EST. PATIENT-LVL V: ICD-10-PCS | Mod: PBBFAC,HCNC,, | Performed by: PODIATRIST

## 2020-10-21 PROCEDURE — 3008F BODY MASS INDEX DOCD: CPT | Mod: HCNC,CPTII,S$GLB, | Performed by: STUDENT IN AN ORGANIZED HEALTH CARE EDUCATION/TRAINING PROGRAM

## 2020-10-21 PROCEDURE — 3077F SYST BP >= 140 MM HG: CPT | Mod: HCNC,CPTII,S$GLB, | Performed by: STUDENT IN AN ORGANIZED HEALTH CARE EDUCATION/TRAINING PROGRAM

## 2020-10-21 PROCEDURE — 3077F PR MOST RECENT SYSTOLIC BLOOD PRESSURE >= 140 MM HG: ICD-10-PCS | Mod: HCNC,CPTII,S$GLB, | Performed by: STUDENT IN AN ORGANIZED HEALTH CARE EDUCATION/TRAINING PROGRAM

## 2020-10-21 PROCEDURE — 73030 X-RAY EXAM OF SHOULDER: CPT | Mod: 26,HCNC,LT, | Performed by: RADIOLOGY

## 2020-10-21 PROCEDURE — 3008F PR BODY MASS INDEX (BMI) DOCUMENTED: ICD-10-PCS | Mod: HCNC,CPTII,S$GLB, | Performed by: STUDENT IN AN ORGANIZED HEALTH CARE EDUCATION/TRAINING PROGRAM

## 2020-10-21 PROCEDURE — 3078F DIAST BP <80 MM HG: CPT | Mod: HCNC,CPTII,S$GLB, | Performed by: STUDENT IN AN ORGANIZED HEALTH CARE EDUCATION/TRAINING PROGRAM

## 2020-10-21 PROCEDURE — 73030 X-RAY EXAM OF SHOULDER: CPT | Mod: TC,HCNC,LT

## 2020-10-21 PROCEDURE — 99214 OFFICE O/P EST MOD 30 MIN: CPT | Mod: HCNC,25,S$GLB, | Performed by: STUDENT IN AN ORGANIZED HEALTH CARE EDUCATION/TRAINING PROGRAM

## 2020-10-21 PROCEDURE — 3078F PR MOST RECENT DIASTOLIC BLOOD PRESSURE < 80 MM HG: ICD-10-PCS | Mod: HCNC,CPTII,S$GLB, | Performed by: PODIATRIST

## 2020-10-21 PROCEDURE — 99999 PR PBB SHADOW E&M-EST. PATIENT-LVL V: CPT | Mod: PBBFAC,HCNC,, | Performed by: PODIATRIST

## 2020-10-21 PROCEDURE — 99214 PR OFFICE/OUTPT VISIT, EST, LEVL IV, 30-39 MIN: ICD-10-PCS | Mod: HCNC,25,S$GLB, | Performed by: STUDENT IN AN ORGANIZED HEALTH CARE EDUCATION/TRAINING PROGRAM

## 2020-10-21 PROCEDURE — 3044F HG A1C LEVEL LT 7.0%: CPT | Mod: HCNC,CPTII,S$GLB, | Performed by: PODIATRIST

## 2020-10-21 PROCEDURE — 3008F PR BODY MASS INDEX (BMI) DOCUMENTED: ICD-10-PCS | Mod: HCNC,CPTII,S$GLB, | Performed by: PODIATRIST

## 2020-10-21 PROCEDURE — 73030 XR SHOULDER COMPLETE 2 OR MORE VIEWS LEFT: ICD-10-PCS | Mod: 26,HCNC,LT, | Performed by: RADIOLOGY

## 2020-10-21 PROCEDURE — 99999 PR PBB SHADOW E&M-EST. PATIENT-LVL V: ICD-10-PCS | Mod: PBBFAC,HCNC,, | Performed by: STUDENT IN AN ORGANIZED HEALTH CARE EDUCATION/TRAINING PROGRAM

## 2020-10-21 PROCEDURE — 3079F DIAST BP 80-89 MM HG: CPT | Mod: HCNC,CPTII,S$GLB, | Performed by: STUDENT IN AN ORGANIZED HEALTH CARE EDUCATION/TRAINING PROGRAM

## 2020-10-21 PROCEDURE — 3078F DIAST BP <80 MM HG: CPT | Mod: HCNC,CPTII,S$GLB, | Performed by: PODIATRIST

## 2020-10-21 PROCEDURE — 99999 PR PBB SHADOW E&M-EST. PATIENT-LVL IV: ICD-10-PCS | Mod: PBBFAC,HCNC,, | Performed by: STUDENT IN AN ORGANIZED HEALTH CARE EDUCATION/TRAINING PROGRAM

## 2020-10-21 PROCEDURE — 3008F BODY MASS INDEX DOCD: CPT | Mod: HCNC,CPTII,S$GLB, | Performed by: PODIATRIST

## 2020-10-21 PROCEDURE — 99214 OFFICE O/P EST MOD 30 MIN: CPT | Mod: HCNC,S$GLB,, | Performed by: STUDENT IN AN ORGANIZED HEALTH CARE EDUCATION/TRAINING PROGRAM

## 2020-10-21 PROCEDURE — 99212 OFFICE O/P EST SF 10 MIN: CPT | Mod: HCNC,S$GLB,, | Performed by: PODIATRIST

## 2020-10-21 PROCEDURE — 20610 LARGE JOINT ASPIRATION/INJECTION: L SUBACROMIAL BURSA: ICD-10-PCS | Mod: HCNC,LT,S$GLB, | Performed by: STUDENT IN AN ORGANIZED HEALTH CARE EDUCATION/TRAINING PROGRAM

## 2020-10-21 PROCEDURE — 99214 PR OFFICE/OUTPT VISIT, EST, LEVL IV, 30-39 MIN: ICD-10-PCS | Mod: HCNC,S$GLB,, | Performed by: STUDENT IN AN ORGANIZED HEALTH CARE EDUCATION/TRAINING PROGRAM

## 2020-10-21 RX ORDER — TRIAMCINOLONE ACETONIDE 1 MG/G
OINTMENT TOPICAL 2 TIMES DAILY
Qty: 454 G | Refills: 0 | Status: SHIPPED | OUTPATIENT
Start: 2020-10-21 | End: 2022-06-09 | Stop reason: SDUPTHER

## 2020-10-21 RX ORDER — TRIAMCINOLONE ACETONIDE 40 MG/ML
80 INJECTION, SUSPENSION INTRA-ARTICULAR; INTRAMUSCULAR
Status: DISCONTINUED | OUTPATIENT
Start: 2020-10-21 | End: 2020-10-21 | Stop reason: HOSPADM

## 2020-10-21 RX ORDER — SULFAMETHOXAZOLE AND TRIMETHOPRIM 800; 160 MG/1; MG/1
1 TABLET ORAL 2 TIMES DAILY
Qty: 20 TABLET | Refills: 0 | Status: SHIPPED | OUTPATIENT
Start: 2020-10-21 | End: 2020-10-31

## 2020-10-21 RX ORDER — DOXYCYCLINE 100 MG/1
100 CAPSULE ORAL 2 TIMES DAILY
Qty: 20 CAPSULE | Refills: 0 | Status: SHIPPED | OUTPATIENT
Start: 2020-10-21 | End: 2021-05-24 | Stop reason: ALTCHOICE

## 2020-10-21 RX ORDER — HYDROCODONE BITARTRATE AND ACETAMINOPHEN 10; 325 MG/1; MG/1
1 TABLET ORAL EVERY 8 HOURS PRN
Qty: 20 TABLET | Refills: 0 | Status: SHIPPED | OUTPATIENT
Start: 2020-10-22 | End: 2020-10-28 | Stop reason: SDUPTHER

## 2020-10-21 RX ORDER — METFORMIN HYDROCHLORIDE 500 MG/1
1000 TABLET, EXTENDED RELEASE ORAL 2 TIMES DAILY WITH MEALS
Qty: 360 TABLET | Refills: 3 | Status: SHIPPED | OUTPATIENT
Start: 2020-10-21 | End: 2021-03-23 | Stop reason: SDUPTHER

## 2020-10-21 RX ORDER — FLUOCINONIDE TOPICAL SOLUTION USP, 0.05% 0.5 MG/ML
SOLUTION TOPICAL 2 TIMES DAILY
Qty: 60 ML | Refills: 2 | Status: SHIPPED | OUTPATIENT
Start: 2020-10-21 | End: 2021-08-03

## 2020-10-21 RX ORDER — LISINOPRIL 10 MG/1
10 TABLET ORAL DAILY
COMMUNITY
Start: 2020-10-07 | End: 2021-03-23 | Stop reason: SDUPTHER

## 2020-10-21 RX ADMIN — TRIAMCINOLONE ACETONIDE 80 MG: 40 INJECTION, SUSPENSION INTRA-ARTICULAR; INTRAMUSCULAR at 09:10

## 2020-10-21 NOTE — PROGRESS NOTES
Orthopaedics Sports Medicine     Shoulder Initial Visit         10/21/2020  8:55 AM    Referring MD: Dillan Tsai MD    Chief Complaint   Patient presents with    Left Shoulder - Pain         History of Present Illness:   Crow Green is a 65 y.o. Right-hand dominant male who presents with LEFT shoulder pain and dysfunction that developed over 1 year ago.  He does not recall any specific injury but has had gradual onset of pain and limited motion in that shoulder.  This has been progressive over time.  Pain is constantly aching with intermittent shooting quality.  Pain is worse with movement of the shoulder, lifting, reaching, and he also endorses night pain.  He has tried multiple nonoperative treatments including rest, activity modification, NSAIDs, physical therapy, and corticosteroid injection.  These have been moderately helpful but did not provide durable relief.    Of note he does have diabetes and had a recent toe amputation.  He is still getting routine wound care for this every other day as it has not yet healed completely.    Treatments to date: rest, NSAIDs, activity modification, physical therapy, corticosteroid injection    Past Medical History:   Past Medical History:   Diagnosis Date    Arthritis     Asthma     Atrial fibrillation     Atrial fibrillation with RVR 8/7/2019    CHF (congestive heart failure)     Chronic obstructive pulmonary disease 8/5/2020    Depression     Dermatomyositis     Diabetes mellitus, type 2 Diagnosed in 2000    Elevated PSA     Follow up 7/30/2020    Hypertension     Sleep apnea        Past Surgical History:   Past Surgical History:   Procedure Laterality Date    ABLATION OF ARRHYTHMOGENIC FOCUS FOR ATRIAL FIBRILLATION N/A 2/27/2020    Procedure: Ablation atrial fibrillation;  Surgeon: Gio Brwon MD;  Location: [facility];  Service: Cardiology;  Laterality: N/A;  afib, DAMIEN (cx if SR), PVI, PITO, anes, MB, 3 Prep    CHOLECYSTECTOMY       CLOSURE OF WOUND Left 7/1/2020    Procedure: CLOSURE, WOUND;  Surgeon: Barb Veras DPM;  Location: [facility] OR;  Service: Podiatry;  Laterality: Left;    SELECTIVE INJECTION OF ANESTHETIC AGENT AROUND LUMBAR SPINAL NERVE ROOT BY TRANSFORAMINAL APPROACH Left 6/11/2020    Procedure: BLOCK, SPINAL NERVE ROOT, LUMBAR, SELECTIVE, TRANSFORAMINAL APPROACH Left L4-5, L5-S1 TFESI with RN IV sedation;  Surgeon: Dillan Tsai MD;  Location: [facility];  Service: Pain Management;  Laterality: Left;    TOE AMPUTATION Left 6/29/2020    Procedure: AMPUTATION, TOE;  Surgeon: Barb Veras DPM;  Location: [facility] OR;  Service: Podiatry;  Laterality: Left;  great toe       Medications:  Patient's Medications   New Prescriptions    No medications on file   Previous Medications    ALCOHOL SWABS (ALCOHOL PREP PADS) PADM    Apply 1 each topically once daily.    ALLOPURINOL (ZYLOPRIM) 100 MG TABLET    Take 1 tablet (100 mg total) by mouth once daily.    ASPIRIN (ECOTRIN) 81 MG EC TABLET    Take 1 tablet (81 mg total) by mouth once daily.    ATORVASTATIN (LIPITOR) 40 MG TABLET    TAKE 1 TABLET EVERY EVENING    AZATHIOPRINE (IMURAN) 50 MG TAB    Take 1 tablet (50 mg total) by mouth once daily.    BACLOFEN (LIORESAL) 10 MG TABLET    Take 1 tablet (10 mg total) by mouth once daily.    BLOOD SUGAR DIAGNOSTIC (TRUE METRIX GLUCOSE TEST STRIP) STRP    Use with blood glucose meter kit to test blood sugar four times  daily    BLOOD-GLUCOSE METER (TRUE METRIX AIR GLUCOSE METER) KIT    TEST FOUR TIMES DAILY BEFORE MEALS  AND EVERY NIGHT    COLCRYS 0.6 MG TABLET    TAKE 1 TABLET EVERY DAY    DICLOFENAC SODIUM (VOLTAREN) 1 % GEL    Apply 2 g topically 4 (four) times daily.    DOXYCYCLINE (VIBRAMYCIN) 100 MG CAP    Take 1 capsule (100 mg total) by mouth 2 (two) times daily.    ELIQUIS 5 MG TAB    TAKE 1 TABLET TWICE DAILY    FLUTICASONE-SALMETEROL DISKUS INHALER 250-50 MCG    Inhale 1 puff into the lungs 2 (two) times daily. Controller    FOOD  SUPPLEMT, LACTOSE-REDUCED (ENSURE) LIQD    Take 118 mLs by mouth 3 (three) times daily with meals.    FUROSEMIDE (LASIX) 40 MG TABLET    TAKE 2 TABLETS EVERY MORNING  AND TAKE 1 TABLET EVERY EVENING    GABAPENTIN (NEURONTIN) 800 MG TABLET    Take 1 tablet (800 mg total) by mouth 3 (three) times daily.    GLIMEPIRIDE (AMARYL) 2 MG TABLET    TAKE 1 TABLET (2 MG TOTAL) BY MOUTH BEFORE BREAKFAST.    HYDROCODONE-ACETAMINOPHEN (NORCO)  MG PER TABLET    Take 1 tablet by mouth every 8 (eight) hours as needed for Pain.    INHALATION SPACING DEVICE    Use as directed for inhalation.    INSULIN (LANTUS SOLOSTAR U-100 INSULIN) GLARGINE 100 UNITS/ML (3ML) SUBQ PEN    INJECT 35 UNITS SUBCUTANEOUSLY EVERY EVENING    INSULIN ASPART U-100 (NOVOLOG FLEXPEN U-100 INSULIN) 100 UNIT/ML (3 ML) INPN PEN    INJECT 10 UNITS SUBCUTANEOUSLY THREE TIMES DAILY WITH MEALS    INSULIN SYRINGE-NEEDLE U-100 1 ML 31 GAUGE X 5/16 SYRG        IPRATROPIUM (ATROVENT) 0.02 % NEBULIZER SOLUTION    USE 1 VIAL VIA NEBULIZER FOUR TIMES DAILY    ISOSORBIDE MONONITRATE (IMDUR) 60 MG 24 HR TABLET    Take 1 tablet (60 mg total) by mouth once daily.    LANCETS 32 GAUGE MISC    1 lancet by Misc.(Non-Drug; Combo Route) route 2 (two) times daily.    LATANOPROST 0.005 % OPHTHALMIC SOLUTION    INSTILL 1 DROP INTO BOTH EYES EVERY EVENING    LISINOPRIL 10 MG TABLET        MAGNESIUM OXIDE (MAG-OX) 400 MG (241.3 MG MAGNESIUM) TABLET    Take 1 tablet (400 mg total) by mouth once daily.    METFORMIN (GLUCOPHAGE-XR) 500 MG XR 24HR TABLET    Take 2 tablets (1,000 mg total) by mouth 2 (two) times daily with meals.    METOPROLOL SUCCINATE (TOPROL-XL) 50 MG 24 HR TABLET    Take 1 tablet (50 mg total) by mouth once daily.    NITROGLYCERIN (NITROSTAT) 0.3 MG SL TABLET    PLACE 1 TABLET UNDER THE TONGUE EVERY 5 MINUTES AS NEEDED FOR CHEST PAIN, NO MORE THAN 3 TABLETS IN ONE DAY    OZEMPIC 1 MG/DOSE (2 MG/1.5 ML) PNIJ    Inject 0.75 mLs into the skin once a week.     "PANTOPRAZOLE (PROTONIX) 40 MG TABLET    Take 1 tablet (40 mg total) by mouth once daily.    PEN NEEDLE, DIABETIC (BD ULTRA-FINE SHORT PEN NEEDLE) 31 GAUGE X 5/16" NDLE    Inject 1 each into the skin 3 (three) times daily.    PREDNISONE (DELTASONE) 5 MG TABLET    Take 1 tablet (5 mg total) by mouth 2 (two) times daily.    SERTRALINE (ZOLOFT) 100 MG TABLET    Take 1 tablet (100 mg total) by mouth every evening.    SILVER SULFADIAZINE 1% (SILVADENE) 1 % CREAM    Apply topically 2 (two) times daily.    TAMSULOSIN (FLOMAX) 0.4 MG CAP    Take 1 capsule (0.4 mg total) by mouth once daily.    TIOTROPIUM (SPIRIVA WITH HANDIHALER) 18 MCG INHALATION CAPSULE    Inhale 1 capsule (18 mcg total) into the lungs once daily. Controller    TRAZODONE (DESYREL) 100 MG TABLET    Take 2 tablets (200 mg total) by mouth every evening.    VIOS AEROSOL DELIVERY SYSTEM VIN    USE AS DIRESTED   Modified Medications    No medications on file   Discontinued Medications    No medications on file       Allergies:   Review of patient's allergies indicates:   Allergen Reactions    Protamine Hives     Urticaria, possible upper airway swelling       Social History:   Home town: [place]  Occupation: not currently working  Alcohol use: He reports previous alcohol use.  Tobacco use: He reports that he has never smoked. He has never used smokeless tobacco.    Review of systems:  History of recent illness, fevers, shakes, or chills: no  History of cardiac problems or chest pain: YES, cardiac cath this summer  History of pulmonary problems or asthma: YES  History of diabetes: YES  History of prior dvt or clotting problems: no  History of sleep apnea: no      Physical Examination:  Estimated body mass index is 31.78 kg/m² as calculated from the following:    Height as of this encounter: 5' 5" (1.651 m).    Weight as of this encounter: 86.6 kg (191 lb).    General  Healthy appearing male in no acute distress  Alert and oriented, normal mood, appropriate " affect    Shoulder Examination:  Patient is alert and oriented, no distress. Skin is intact. Neuro is normal with no focal motor or sensory findings.    Cervical exam is unremarkable. Intact cervical ROM. Negative Spurling's test    Physical Exam:  RIGHT    LEFT    Atrophy:   (-)    (-)   Deformity   (-)    (-)  Scap Dyskinesis/Winging (-)    +    Tenderness:          Greater Tuberosity             (-)    (-)  Bicipital Groove  (-)    +  AC joint   (-)    (-)  Other:     ROM:  Forward Elevation 180    100  Abduction  140    60  ER (at side)  80    10  IR   T8    belt    Strength:   Supraspinatus  5/5    4/5  Infraspinatus  5/5 4/5  Subscap / IR  5/5 5/5     Special Tests:   Neer:    (-)    +   Arevalo:   (-)    +   SS Stress:   (-)    +   Belly Press:   (-)    (-)   Lift Off:    (-)    (-)   Eudora's:   (-)    +   Speeds:   (-)    (-)   Resisted Thrower's:   (-)    +   Cross Arm Abduction:  (-)    (-)    Neurovascular examination  - Motor grossly intact bilaterally to shoulder abduction, elbow flexion and extension, wrist flexion and extension, and intrinsic hand musculature  - Sensation intact to light touch bilaterally in axillary, median, radial, and ulnar distributions  - Symmetrical radial pulses      Imaging:  XR Left shoulder (10/21/2020):  No left shoulder fracture.  No suspicious osseous lesion.  Alignment of the shoulder is normal.  Mild glenohumeral joint osteoarthritis.  Mild left acromioclavicular osteoarthritis is noted.  Visualized lungs are clear.    Physician Read: I agree with the above impression.    MRI Left shoulder (12/13/19):  Massive rotator cuff tear as above with associated atrophy of the supraspinatus and infraspinatus muscle bellies.     Moderate tendinosis of the subscapularis.      Impression:  65 y.o. male with left rotator cuff tear arthropathy      Plan:  1. Discussed diagnosis and treatment options with him today  2. He has left rotator cuff tear arthropathy.  He has tried  some nonoperative treatment with OK results.  He just has not had long-lasting relief.  3. I discussed with him that his rotator cuff tear, in my opinion, is not repairable.  He has a massive and retracted rotator cuff tear with fatty atrophy.  I think he would be a good candidate for a reverse total shoulder replacement.  4. He has several factors limiting his overall candidacy for this surgery.  First of all he has a nonhealing foot wound that is a significant potential source of infection.  Sec, he has a history of a recent cardiac catheterization.  And 3rd he does not have any social support right now it would not have anyone to help him after a surgery.  5. I discussed these limitations with him today.  Hopefully these are things that will resolve or that he can help resolve moving forward.  In the meantime I offered him a subacromial space corticosteroid injection today.  6. Follow up 2 months for recheck           Anthony Alvarado MD

## 2020-10-21 NOTE — PROGRESS NOTES
Subjective:     Patient ID: Crow Green is a 65 y.o. male.    Chief Complaint: Wound Check (PCP Dr. Lambert 10/01/20 1 wk Wound Check left foot )    Crow is a 65 y.o. male who presents to the clinic for evaluation and treatment of high risk feet. Crow has a past medical history of Arthritis, Asthma, Atrial fibrillation, Atrial fibrillation with RVR (8/7/2019), CHF (congestive heart failure), Chronic obstructive pulmonary disease (8/5/2020), Depression, Dermatomyositis, Diabetes mellitus, type 2 (Diagnosed in 2000), Elevated PSA, Follow up (7/30/2020), Hypertension, and Sleep apnea. The patient's chief complaint left foot ulcer. Patient states home health did not come out to change dressing. Patient states he had pain on the side of the foot over the weekend.  This patient has documented high risk feet requiring routine maintenance secondary to diabetes mellitis and those secondary complications of diabetes, as mentioned..    PCP: Mateo Lambert, DO    Date Last Seen by PCP: 10/01/2200    Current shoe gear:  Affected Foot: Surgical boot     Unaffected Foot: Tennis shoes    History of Trauma: negative  Sign of Infection: none    Hemoglobin A1C   Date Value Ref Range Status   10/01/2020 6.6 (H) 4.0 - 5.6 % Final     Comment:     ADA Screening Guidelines:  5.7-6.4%  Consistent with prediabetes  >or=6.5%  Consistent with diabetes  High levels of fetal hemoglobin interfere with the HbA1C  assay. Heterozygous hemoglobin variants (HbS, HgC, etc)do  not significantly interfere with this assay.   However, presence of multiple variants may affect accuracy.     05/19/2020 7.7 (H) 4.0 - 5.6 % Final     Comment:     ADA Screening Guidelines:  5.7-6.4%  Consistent with prediabetes  >or=6.5%  Consistent with diabetes  High levels of fetal hemoglobin interfere with the HbA1C  assay. Heterozygous hemoglobin variants (HbS, HgC, etc)do  not significantly interfere with this assay.   However, presence of multiple variants  may affect accuracy.     12/13/2019 7.5 (H) 4.0 - 5.6 % Final     Comment:     ADA Screening Guidelines:  5.7-6.4%  Consistent with prediabetes  >or=6.5%  Consistent with diabetes  High levels of fetal hemoglobin interfere with the HbA1C  assay. Heterozygous hemoglobin variants (HbS, HgC, etc)do  not significantly interfere with this assay.   However, presence of multiple variants may affect accuracy.         Patient Active Problem List   Diagnosis    ED (erectile dysfunction)    Non-proliferative diabetic retinopathy, mild, both eyes    Hypertension associated with diabetes    Diabetic polyneuropathy    Nonobstructive coronary artery disease of native artery of native heart    Chronic combined systolic and diastolic HFrEF of 45%    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, with long-term current use of insulin    SANDRITA on CPAP    Moderate asthma    Psychophysiological insomnia    Nightmares    Sleep related gastroesophageal reflux disease    Inadequate sleep hygiene    PAF (paroxysmal atrial fibrillation)    At risk for medication noncompliance    Obesity (BMI 30.0-34.9)    Indeterminate stage chronic open angle glaucoma    Right hip pain    Gait instability    Chronic right shoulder pain    Arthritis    Left sided sciatica    Dyspnea on exertion    Osteoarthritis of spine with radiculopathy, lumbar region    HNP (herniated nucleus pulposus), lumbar    Gastroesophageal reflux disease without esophagitis    Chronic diastolic heart failure    Hypogonadism in male    Disorder of parathyroid gland    Hyperlipidemia associated with type 2 diabetes mellitus    JOSE MARIA (acute kidney injury)    LRTI (lower respiratory tract infection)    Physical deconditioning    Acute pain of left shoulder    Traumatic tear of left rotator cuff    Atrial fibrillation with RVR    Other chronic pain    Left shoulder pain    Complete tear of left  rotator cuff    Stage 3 chronic kidney disease    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 associated with type 2 diabetes mellitus    Lumbar radiculopathy    Diabetic ulcer of toe of left foot associated with type 2 diabetes mellitus, with necrosis of muscle    Ulcer of left foot    Elevated troponin    Hypotension due to medication    Dermatomyositis    Postprocedural hypotension    Follow up    Weakness generalized    Advanced directives, counseling/discussion    Cocaine dependence, in remission    Schizoaffective disorder, depressive type    Chronic obstructive pulmonary disease    Severe episode of recurrent major depressive disorder, without psychotic features    DOMINIK (generalized anxiety disorder)       Medication List with Changes/Refills   New Medications    SULFAMETHOXAZOLE-TRIMETHOPRIM 800-160MG (BACTRIM DS) 800-160 MG TAB    Take 1 tablet by mouth 2 (two) times daily. for 10 days   Current Medications    ALCOHOL SWABS (ALCOHOL PREP PADS) PADM    Apply 1 each topically once daily.    ALLOPURINOL (ZYLOPRIM) 100 MG TABLET    Take 1 tablet (100 mg total) by mouth once daily.    ASPIRIN (ECOTRIN) 81 MG EC TABLET    Take 1 tablet (81 mg total) by mouth once daily.    ATORVASTATIN (LIPITOR) 40 MG TABLET    TAKE 1 TABLET EVERY EVENING    AZATHIOPRINE (IMURAN) 50 MG TAB    Take 1 tablet (50 mg total) by mouth once daily.    BACLOFEN (LIORESAL) 10 MG TABLET    Take 1 tablet (10 mg total) by mouth once daily.    BLOOD SUGAR DIAGNOSTIC (TRUE METRIX GLUCOSE TEST STRIP) STRP    Use with blood glucose meter kit to test blood sugar four times  daily    BLOOD-GLUCOSE METER (TRUE METRIX AIR GLUCOSE METER) KIT    TEST FOUR TIMES DAILY BEFORE MEALS  AND EVERY NIGHT    COLCRYS 0.6 MG TABLET    TAKE 1 TABLET EVERY DAY    DICLOFENAC SODIUM (VOLTAREN) 1 % GEL    Apply 2 g topically 4 (four) times daily.    ELIQUIS 5 MG TAB    TAKE 1 TABLET TWICE DAILY    FLUOCINONIDE (LIDEX)  0.05 % EXTERNAL SOLUTION    Apply topically 2 (two) times daily. Use on scalp.    FLUTICASONE-SALMETEROL DISKUS INHALER 250-50 MCG    Inhale 1 puff into the lungs 2 (two) times daily. Controller    FOOD SUPPLEMT, LACTOSE-REDUCED (ENSURE) LIQD    Take 118 mLs by mouth 3 (three) times daily with meals.    FUROSEMIDE (LASIX) 40 MG TABLET    TAKE 2 TABLETS EVERY MORNING  AND TAKE 1 TABLET EVERY EVENING    GABAPENTIN (NEURONTIN) 800 MG TABLET    Take 1 tablet (800 mg total) by mouth 3 (three) times daily.    GLIMEPIRIDE (AMARYL) 2 MG TABLET    TAKE 1 TABLET (2 MG TOTAL) BY MOUTH BEFORE BREAKFAST.    INHALATION SPACING DEVICE    Use as directed for inhalation.    INSULIN (LANTUS SOLOSTAR U-100 INSULIN) GLARGINE 100 UNITS/ML (3ML) SUBQ PEN    INJECT 35 UNITS SUBCUTANEOUSLY EVERY EVENING    INSULIN ASPART U-100 (NOVOLOG FLEXPEN U-100 INSULIN) 100 UNIT/ML (3 ML) INPN PEN    INJECT 10 UNITS SUBCUTANEOUSLY THREE TIMES DAILY WITH MEALS    INSULIN SYRINGE-NEEDLE U-100 1 ML 31 GAUGE X 5/16 SYRG        IPRATROPIUM (ATROVENT) 0.02 % NEBULIZER SOLUTION    USE 1 VIAL VIA NEBULIZER FOUR TIMES DAILY    ISOSORBIDE MONONITRATE (IMDUR) 60 MG 24 HR TABLET    Take 1 tablet (60 mg total) by mouth once daily.    LANCETS 32 GAUGE MISC    1 lancet by Misc.(Non-Drug; Combo Route) route 2 (two) times daily.    LATANOPROST 0.005 % OPHTHALMIC SOLUTION    INSTILL 1 DROP INTO BOTH EYES EVERY EVENING    LISINOPRIL 10 MG TABLET        MAGNESIUM OXIDE (MAG-OX) 400 MG (241.3 MG MAGNESIUM) TABLET    Take 1 tablet (400 mg total) by mouth once daily.    METOPROLOL SUCCINATE (TOPROL-XL) 50 MG 24 HR TABLET    Take 1 tablet (50 mg total) by mouth once daily.    NITROGLYCERIN (NITROSTAT) 0.3 MG SL TABLET    PLACE 1 TABLET UNDER THE TONGUE EVERY 5 MINUTES AS NEEDED FOR CHEST PAIN, NO MORE THAN 3 TABLETS IN ONE DAY    OZEMPIC 1 MG/DOSE (2 MG/1.5 ML) PNIJ    Inject 0.75 mLs into the skin once a week.    PANTOPRAZOLE (PROTONIX) 40 MG TABLET    Take 1 tablet (40 mg  "total) by mouth once daily.    PEN NEEDLE, DIABETIC (BD ULTRA-FINE SHORT PEN NEEDLE) 31 GAUGE X 5/16" NDLE    Inject 1 each into the skin 3 (three) times daily.    PREDNISONE (DELTASONE) 5 MG TABLET    Take 1 tablet (5 mg total) by mouth 2 (two) times daily.    SERTRALINE (ZOLOFT) 100 MG TABLET    Take 1 tablet (100 mg total) by mouth every evening.    SILVER SULFADIAZINE 1% (SILVADENE) 1 % CREAM    Apply topically 2 (two) times daily.    TAMSULOSIN (FLOMAX) 0.4 MG CAP    Take 1 capsule (0.4 mg total) by mouth once daily.    TIOTROPIUM (SPIRIVA WITH HANDIHALER) 18 MCG INHALATION CAPSULE    Inhale 1 capsule (18 mcg total) into the lungs once daily. Controller    TRAZODONE (DESYREL) 100 MG TABLET    Take 2 tablets (200 mg total) by mouth every evening.    TRIAMCINOLONE ACETONIDE 0.1% (KENALOG) 0.1 % OINTMENT    Apply topically 2 (two) times daily. Use on legs and back    VIOS AEROSOL DELIVERY SYSTEM VIN    USE AS DIRESTED   Changed and/or Refilled Medications    Modified Medication Previous Medication    DOXYCYCLINE (VIBRAMYCIN) 100 MG CAP doxycycline (VIBRAMYCIN) 100 MG Cap       Take 1 capsule (100 mg total) by mouth 2 (two) times daily.    Take 1 capsule (100 mg total) by mouth 2 (two) times daily.    HYDROCODONE-ACETAMINOPHEN (NORCO)  MG PER TABLET HYDROcodone-acetaminophen (NORCO)  mg per tablet       Take 1 tablet by mouth every 8 (eight) hours as needed for Pain.    Take 1 tablet by mouth every 8 (eight) hours as needed for Pain.    METFORMIN (GLUCOPHAGE-XR) 500 MG ER 24HR TABLET metFORMIN (GLUCOPHAGE-XR) 500 MG XR 24hr tablet       Take 2 tablets (1,000 mg total) by mouth 2 (two) times daily with meals.    Take 2 tablets (1,000 mg total) by mouth 2 (two) times daily with meals.       Review of patient's allergies indicates:   Allergen Reactions    Protamine Hives     Urticaria, possible upper airway swelling       Past Surgical History:   Procedure Laterality Date    ABLATION OF ARRHYTHMOGENIC " FOCUS FOR ATRIAL FIBRILLATION N/A 2/27/2020    Procedure: Ablation atrial fibrillation;  Surgeon: Gio Brown MD;  Location: [facility] EP LAB;  Service: Cardiology;  Laterality: N/A;  afib, DAMIEN (cx if SR), PVI, PITO, anes, MB, 3 Prep    CHOLECYSTECTOMY      CLOSURE OF WOUND Left 7/1/2020    Procedure: CLOSURE, WOUND;  Surgeon: Barb Veras DPM;  Location: [facility] OR;  Service: Podiatry;  Laterality: Left;    SELECTIVE INJECTION OF ANESTHETIC AGENT AROUND LUMBAR SPINAL NERVE ROOT BY TRANSFORAMINAL APPROACH Left 6/11/2020    Procedure: BLOCK, SPINAL NERVE ROOT, LUMBAR, SELECTIVE, TRANSFORAMINAL APPROACH Left L4-5, L5-S1 TFESI with RN IV sedation;  Surgeon: Dillan Tsai MD;  Location: [facility] PAIN MGT;  Service: Pain Management;  Laterality: Left;    TOE AMPUTATION Left 6/29/2020    Procedure: AMPUTATION, TOE;  Surgeon: Barb Veras DPM;  Location: [facility] OR;  Service: Podiatry;  Laterality: Left;  great toe       Family History   Problem Relation Age of Onset    Glaucoma Mother     Cataracts Mother     Cataracts Father     Glaucoma Sister     Cataracts Sister     Glaucoma Maternal Aunt     No Known Problems Brother     No Known Problems Daughter     No Known Problems Son     Prostate cancer Neg Hx        Social History     Socioeconomic History    Marital status: Legally      Spouse name: Not on file    Number of children: 5    Years of education: Not on file    Highest education level: Not on file   Occupational History    Occupation: disabled   Social Needs    Financial resource strain: Very hard    Food insecurity     Worry: Often true     Inability: Often true    Transportation needs     Medical: Yes     Non-medical: Yes   Tobacco Use    Smoking status: Never Smoker    Smokeless tobacco: Never Used   Substance and Sexual Activity    Alcohol use: Not Currently    Drug use: No    Sexual activity: Yes     Partners: Female   Lifestyle    Physical activity     Days per week: 0  "days     Minutes per session: 0 min    Stress: Rather much   Relationships    Social connections     Talks on phone: Three times a week     Gets together: Never     Attends [employer] service: More than 4 times per year     Active member of club or organization: No     Attends meetings of clubs or organizations: Never     Relationship status:    Other Topics Concern    Not on file   Social History Narrative    Utilizing [employer] transportation which has been unreliable.        Vitals:    10/21/20 0901   BP: (!) 159/91   Pulse: 85   Weight: 86.6 kg (190 lb 14.7 oz)   Height: 5' 5" (1.651 m)       Hemoglobin A1C   Date Value Ref Range Status   10/01/2020 6.6 (H) 4.0 - 5.6 % Final     Comment:     ADA Screening Guidelines:  5.7-6.4%  Consistent with prediabetes  >or=6.5%  Consistent with diabetes  High levels of fetal hemoglobin interfere with the HbA1C  assay. Heterozygous hemoglobin variants (HbS, HgC, etc)do  not significantly interfere with this assay.   However, presence of multiple variants may affect accuracy.     05/19/2020 7.7 (H) 4.0 - 5.6 % Final     Comment:     ADA Screening Guidelines:  5.7-6.4%  Consistent with prediabetes  >or=6.5%  Consistent with diabetes  High levels of fetal hemoglobin interfere with the HbA1C  assay. Heterozygous hemoglobin variants (HbS, HgC, etc)do  not significantly interfere with this assay.   However, presence of multiple variants may affect accuracy.     12/13/2019 7.5 (H) 4.0 - 5.6 % Final     Comment:     ADA Screening Guidelines:  5.7-6.4%  Consistent with prediabetes  >or=6.5%  Consistent with diabetes  High levels of fetal hemoglobin interfere with the HbA1C  assay. Heterozygous hemoglobin variants (HbS, HgC, etc)do  not significantly interfere with this assay.   However, presence of multiple variants may affect accuracy.         Review of Systems   Constitutional: Negative for chills and fever.   Respiratory: Negative for shortness of breath.    Cardiovascular: " Negative for chest pain, palpitations, orthopnea, claudication and leg swelling.   Gastrointestinal: Negative for diarrhea, nausea and vomiting.   Musculoskeletal: Negative for joint pain.   Skin: Negative for rash.   Neurological: Positive for tingling and sensory change.   Psychiatric/Behavioral: Negative.              Objective:      PHYSICAL EXAM: Apperance: Alert and orient in no distress,well developed, and with good attention to grooming and body habits  Patient presents ambulating in surgical shoe and dressing to left foot and tennis shoe on the right.   Lower Extremity Exam  VASCULAR: Dorsalis pedis pulses 2/4 left and Posterior Tibial pulses 2/4 left. Capillary fill time <4 seconds left. No edema observed left. Varicosities present left. Skin temperature of the lower extremities is warm to warm, proximal to distal. Hair growth dim left. (--) lymphangitis or (--) cellulitis noted left.  DERMATOLOGICAL: (--) edema, (--) erythema, (--) malodor, (+)  drainage, (+) warmth to left foot. Ulcer measurement (post) 0.8cm x 0.5cm x 0.1cm.  The ulcer does not extend into deeper tissue and (--) sinus tracts exist.  Ulcer noted to left medial 1st metatarsal head  with granular base and with a 1 mm yellow/white border and hyperkeratosis surrounding ulcer. Ulcer measurement (post) 0.2cm x 0.1cm x 0.1cm.  The ulcer does not extend into deeper tissue and (--) sinus tracts exist. The dorsum surface of the feet are soft and supple.  The plantar aspects of feet are dry and scaly. Webspaces 2,3,4 clean, dry and without evidence of break in skin integrity left.   NEUROLOGICAL: Light touch, sharp-dull, proprioception all present and equal bilaterally. Protective sensation absent sites as tested with a [place]-Kimmie 5.07 monofilament.   MUSCULOSKELETAL: Muscle strength is 5/5 for foot inverters, everters, plantarflexors, and dorsiflexors. Muscle tone is normal. Left hallux amputation noted.         Assessment:       Encounter  Diagnoses   Name Primary?   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  Dispense: 20 capsule; Refill: 0  -     sulfamethoxazole-trimethoprim 800-160mg (BACTRIM DS) 800-160 mg Tab; Take 1 tablet by mouth 2 (two) times daily. for 10 days  Dispense: 20 tablet; Refill: 0  -     HYDROcodone-acetaminophen (NORCO)  mg per tablet; Take 1 tablet by mouth every 8 (eight) hours as needed for Pain.  Dispense: 20 tablet; Refill: 0    Dehiscence of amputation stump - Left Foot  -     doxycycline (VIBRAMYCIN) 100 MG Cap; Take 1 capsule (100 mg total) by mouth 2 (two) times daily.  Dispense: 20 capsule; Refill: 0  -     sulfamethoxazole-trimethoprim 800-160mg (BACTRIM DS) 800-160 mg Tab; Take 1 tablet by mouth 2 (two) times daily. for 10 days  Dispense: 20 tablet; Refill: 0  -     HYDROcodone-acetaminophen (NORCO)  mg per tablet; Take 1 tablet by mouth every 8 (eight) hours as needed for Pain.  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  Dispense: 20 tablet; Refill: 0    Acute post-operative pain - Left Foot  -     HYDROcodone-acetaminophen (NORCO)  mg per tablet; Take 1 tablet by mouth every 8 (eight) hours as needed for Pain.  Dispense: 20 tablet; Refill: 0      I counseled the patient on his conditions, regarding findings of my examination,  my impressions, and usual treatment plan.   Left medial 1st metatarsal ulcer sharply excisionally debrided of viable and nonviable tissue from the wound periphery and base. I redefined the wound borders in the process. Debridement was carried into and did include the subcutaneous layer down to good bleeding granular tissue base utilizing sterile #15 blade and tissue nipper and forceps. Wound was irrigated with sterile saline and bleeding was controlled with direct pressure. Minimal blood loss.  Post debridement measurements are contained in the above progress note. The patient tolerated this well. Wound was then dressed with Sarah and dressed with gauze, kerlix, and ACE.   Patient instructed to keep dressing dry, clean and intact until home health comes out.   Home health to change dressing on Fridays and Mondays. Subsequent home health orders completed.   Patient instructed to continue to ambulate in surgical shoe and walker.   Refill completed for Norco 10-325mg to be taken as needed for pain. Patient advised on the possible elevation of blood pressure or heart effects and caution to take pills as needed and to discontinue use if symptoms arise, patient agreed.   Prescription written for Bactrim and Doxycyline to be taken as prescribed.   Patient should call the clinic immediately if any signs of infection such as fever chills sweats increased redness or pain.  Patient to return in 1 week or sooner if needed.            Barb Veras DPM  [facility]

## 2020-10-21 NOTE — PROGRESS NOTES
Subjective:       Patient ID:  Crow Green is a 65 y.o. male who presents for   Chief Complaint   Patient presents with    Dry Skin     peeling ,x 1 month , tx cream      64 yo M with a PMH of dermatomyositis here for concern of dry skin. Patient reports because of his injured foot, he is unable to bathe or shower. He can only wipe himself with a towel. He denies using any moisturizers at this time. The itching has been present for months. He itches mainly on his back and bilateral lower legs.      Review of Systems   Skin: Positive for itching and dry skin. Negative for rash.   Hematologic/Lymphatic: Does not bruise/bleed easily.        Objective:    Physical Exam   Constitutional: He appears well-developed and well-nourished. No distress.   Neurological: He is alert and oriented to person, place, and time. He is not disoriented.   Psychiatric: He has a normal mood and affect.   Skin:   Areas Examined (abnormalities noted in diagram):   Head / Face Inspection Performed  Neck Inspection Performed  Chest / Axilla Inspection Performed  Back Inspection Performed  RUE Inspected  LUE Inspection Performed  RLE Inspected  LLE Inspection Performed              Diagram Legend     Erythematous scaling macule/papule c/w actinic keratosis       Vascular papule c/w angioma      Pigmented verrucoid papule/plaque c/w seborrheic keratosis      Yellow umbilicated papule c/w sebaceous hyperplasia      Irregularly shaped tan macule c/w lentigo     1-2 mm smooth white papules consistent with Milia      Movable subcutaneous cyst with punctum c/w epidermal inclusion cyst      Subcutaneous movable cyst c/w pilar cyst      Firm pink to brown papule c/w dermatofibroma      Pedunculated fleshy papule(s) c/w skin tag(s)      Evenly pigmented macule c/w junctional nevus     Mildly variegated pigmented, slightly irregular-bordered macule c/w mildly atypical nevus      Flesh colored to evenly pigmented papule c/w intradermal nevus        Pink pearly papule/plaque c/w basal cell carcinoma      Erythematous hyperkeratotic cursted plaque c/w SCC      Surgical scar with no sign of skin cancer recurrence      Open and closed comedones      Inflammatory papules and pustules      Verrucoid papule consistent consistent with wart     Erythematous eczematous patches and plaques     Dystrophic onycholytic nail with subungual debris c/w onychomycosis     Umbilicated papule    Erythematous-base heme-crusted tan verrucoid plaque consistent with inflamed seborrheic keratosis     Erythematous Silvery Scaling Plaque c/w Psoriasis     See annotation      Assessment / Plan:        Xerosis cutis  -     triamcinolone acetonide 0.1% (KENALOG) 0.1 % ointment; Apply topically 2 (two) times daily. Use on legs and back  Dispense: 454 g; Refill: 0  -     fluocinonide (LIDEX) 0.05 % external solution; Apply topically 2 (two) times daily. Use on scalp.  Dispense: 60 mL; Refill: 2  - Dry skin care discussed    Pruritus  -     triamcinolone acetonide 0.1% (KENALOG) 0.1 % ointment; Apply topically 2 (two) times daily. Use on legs and back  Dispense: 454 g; Refill: 0  -     fluocinonide (LIDEX) 0.05 % external solution; Apply topically 2 (two) times daily. Use on scalp.  Dispense: 60 mL; Refill: 2  - Dry skin care discussed           Follow up in about 3 months (around 1/21/2021).

## 2020-10-21 NOTE — PROCEDURES
Large Joint Aspiration/Injection: L subacromial bursa    Date/Time: 10/21/2020 9:30 AM  Performed by: Anthony Alvarado MD  Authorized by: Anthony Alvarado MD     Consent Done?:  Yes (Verbal)  Indications:  Pain  Site marked: the procedure site was marked    Timeout: prior to procedure the correct patient, procedure, and site was verified    Prep: patient was prepped and draped in usual sterile fashion      Local anesthesia used?: Yes    Anesthesia:  Local infiltration  Local anesthetic:  Lidocaine 1% without epinephrine    Details:  Needle Size:  22 G  Ultrasonic Guidance for needle placement?: No    Approach:  Posterior  Location:  Shoulder  Site:  L subacromial bursa  Medications:  80 mg triamcinolone acetonide 40 mg/mL  Patient tolerance:  Patient tolerated the procedure well with no immediate complications

## 2020-10-21 NOTE — LETTER
October 21, 2020      Dillan Tsai MD  [address] [address] [facility] [place] [address]           [facility]  [address] [address] [facility] [place] [address]  Phone: [phone]  Fax: [phone]          Patient: Crow Green   MR Number: [id number]   YOB: 1955   Date of Visit: 10/21/2020       Dear Dr. Dillan Tsai:    Thank you for referring Crow Green to me for evaluation. Attached you will find relevant portions of my assessment and plan of care.    If you have questions, please do not hesitate to call me. I look forward to following Crow Green along with you.    Sincerely,    Anthony Alvarado MD    Enclosure  CC:  No Recipients    If you would like to receive this communication electronically, please contact [email or web address] or [phone] to request more information on [employer] Link access.    For providers and/or their staff who would like to refer a patient to [facility], please contact us through our one-stop-shop provider referral line, [facility], at [phone].    If you feel you have received this communication in error or would no longer like to receive these types of communications, please e-mail [email or web address]

## 2020-10-22 ENCOUNTER — TELEPHONE (OUTPATIENT)
Dept: PULMONOLOGY | Facility: CLINIC | Age: 65
End: 2020-10-22

## 2020-10-22 NOTE — TELEPHONE ENCOUNTER
----- Message from Aixa Smiley sent at 10/22/2020 10:35 AM CDT -----  Regarding: RET CALL  Contact: patient  Type:  Patient Returning Call    Who Called:PATIENT  Who Left Message for Patient:NURSE  Does the patient know what this is regarding?:appt  Would the patient rather a call back or a response via [facility]? call  Best Call Back Number:[phone]  Additional Information: please call patient asap

## 2020-10-26 ENCOUNTER — TELEPHONE (OUTPATIENT)
Dept: PRIMARY CARE CLINIC | Facility: CLINIC | Age: 65
End: 2020-10-26

## 2020-10-26 ENCOUNTER — TELEPHONE (OUTPATIENT)
Dept: INTERNAL MEDICINE | Facility: CLINIC | Age: 65
End: 2020-10-26

## 2020-10-26 NOTE — PROGRESS NOTES
IVANW contacted Mr. Green to remnd patient of his appointment tomorrow with Awilda Nogueira LCSW. Patient confirmed the appointment.

## 2020-10-26 NOTE — TELEPHONE ENCOUNTER
----- Message from Jean Christie sent at 10/26/2020  7:59 AM CDT -----  Pt would like return call regarding advice for home health services. Please call back at [phone]. Md Jocelyn

## 2020-10-27 ENCOUNTER — TELEPHONE (OUTPATIENT)
Dept: INTERNAL MEDICINE | Facility: CLINIC | Age: 65
End: 2020-10-27

## 2020-10-27 ENCOUNTER — CLINICAL SUPPORT (OUTPATIENT)
Dept: PRIMARY CARE CLINIC | Facility: CLINIC | Age: 65
End: 2020-10-27
Payer: MEDICARE

## 2020-10-27 DIAGNOSIS — G89.29 OTHER CHRONIC PAIN: ICD-10-CM

## 2020-10-27 DIAGNOSIS — F33.2 SEVERE EPISODE OF RECURRENT MAJOR DEPRESSIVE DISORDER, WITHOUT PSYCHOTIC FEATURES: Primary | ICD-10-CM

## 2020-10-27 DIAGNOSIS — F41.1 GAD (GENERALIZED ANXIETY DISORDER): ICD-10-CM

## 2020-10-27 PROCEDURE — 90837 PR PSYCHOTHERAPY W/PATIENT, 60 MIN: ICD-10-PCS | Mod: BHI,95,, | Performed by: SOCIAL WORKER

## 2020-10-27 PROCEDURE — 90837 PSYTX W PT 60 MINUTES: CPT | Mod: BHI,95,, | Performed by: SOCIAL WORKER

## 2020-10-27 NOTE — TELEPHONE ENCOUNTER
Spoke with pt about HH. 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 Pt stated he started the community center to get out to be  More active.    Spoke with [facility]. Was advised that pt had multiple missed visit due to not being home and can not received HH if working at the community [place]. HH was  ordered by Podiatry, pt is still currently active.

## 2020-10-27 NOTE — PROGRESS NOTES
Individual Psychotherapy (PhD/LCSW)    The patient location is: Pt Home/[place]  The chief complaint leading to consultation is: Anxiety, Depression, and Chronic Pain    Visit type: audio only    Face to Face time with patient: 0  60 minutes of total time spent on the encounter, which includes face to face time and non-face to face time preparing to see the patient (eg, review of tests), Obtaining and/or reviewing separately obtained history, Documenting clinical information in the electronic or other health record, Independently interpreting results (not separately reported) and communicating results to the patient/family/caregiver, or Care coordination (not separately reported).    Each patient to whom he or she provides medical services by telemedicine is:  (1) informed of the relationship between the physician and patient and the respective role of any other health care provider with respect to management of the patient; and (2) notified that he or she may decline to receive medical services by telemedicine and may withdraw from such care at any time.    10/27/2020    Site:  [facility]         Therapeutic Intervention: Met with patient.  Outpatient - Insight oriented psychotherapy 60 min - CPT code 04206 and Outpatient - Behavior modifying psychotherapy 60 min - CPT code 41151    Chief complaint/reason for encounter: depression, anxiety and sleep     Interval history and content of current session: LCSW and patient had follow-up psychotherapy session this date. He reports frustration surrounding Home Health who is seeing to his wound care. He states they have not been out and feels it is due to missing some appointments due to working 4 hours two days a week at the [facility] [employer]. Encouraged patient to contact  to determine if services are no longer available. Discussed appropriate communication and importance of regulating emotions such as anger and frustration. Provided emotional support to patient and  "validation of feelings. He states his foot is "feeling better" this week and he is hopeful his mobility will improve. He is unable to fill his pill box and feels he misses medication due to this. He has a low reading level and becomes easily overwhelmed with daily medication. Will contact patient's PCP regarding getting his medications filled at the [facility] on [address] as they do patient pill packs.     Treatment plan:  · Target symptoms: recurrent depression, anxiety   · Why chosen therapy is appropriate versus another modality: relevant to diagnosis, patient responds to this modality, evidence based practice  · Outcome monitoring methods: self-report, observation, checklist/rating scale  · Therapeutic intervention type: insight oriented psychotherapy, behavior modifying psychotherapy    Risk parameters:  Patient reports no suicidal ideation  Patient reports no homicidal ideation  Patient reports no self-injurious behavior  Patient reports no violent behavior    Verbal deficits: None    Patient's response to intervention:  The patient's response to intervention is accepting.    Treatment Goals:  Mental:  Work on regulation of emotions, specifically anger and frustration.   Physical:  Work on strengthening mobility and walking more. PCP has order electric wheelchair and a rollator     Progress toward goals:  The patient's progress toward goals is good.    Diagnosis:     ICD-10-CM ICD-9-CM   1. Severe episode of recurrent major depressive disorder, without psychotic features  F33.2 296.33   2. DOMINIK (generalized anxiety disorder)  F41.1 300.02   3. Other chronic pain  G89.29 338.29       Plan:  individual psychotherapy    Return to clinic: 2 weeks    Length of Service (minutes): 60      "

## 2020-10-27 NOTE — TELEPHONE ENCOUNTER
----- Message from Kimmie Natarajan sent at 10/27/2020  9:12 AM CDT -----  Type:  Needs Medical Advice    Who Called:  Pt  Crow   Symptoms (please be specific): Pt states Home Health is not coming to his home to change his bandage//they have not come out in 2 weeks   How long has patient had these symptoms:     Pharmacy name and phone #:     Would the patient rather a call back or a response via [facility]?   Call back   Best Call Back Number:    [phone]  Additional Information:   Please call to discuss//thanks/francisco

## 2020-10-28 DIAGNOSIS — E11.49 TYPE II DIABETES MELLITUS WITH NEUROLOGICAL MANIFESTATIONS: ICD-10-CM

## 2020-10-28 DIAGNOSIS — Z89.412 STATUS POST AMPUTATION OF LEFT GREAT TOE: ICD-10-CM

## 2020-10-28 DIAGNOSIS — G89.18 ACUTE POST-OPERATIVE PAIN: ICD-10-CM

## 2020-10-28 DIAGNOSIS — L97.522 ULCER OF LEFT FOOT, WITH FAT LAYER EXPOSED: Primary | ICD-10-CM

## 2020-10-28 DIAGNOSIS — T87.81 DEHISCENCE OF AMPUTATION STUMP: ICD-10-CM

## 2020-10-28 RX ORDER — HYDROCODONE BITARTRATE AND ACETAMINOPHEN 10; 325 MG/1; MG/1
1 TABLET ORAL EVERY 8 HOURS PRN
Qty: 20 TABLET | Refills: 0 | Status: SHIPPED | OUTPATIENT
Start: 2020-10-29 | End: 2020-11-05 | Stop reason: SDUPTHER

## 2020-10-29 ENCOUNTER — TELEPHONE (OUTPATIENT)
Dept: INTERNAL MEDICINE | Facility: CLINIC | Age: 65
End: 2020-10-29

## 2020-10-29 NOTE — TELEPHONE ENCOUNTER
----- Message from Georgette Flores sent at 10/29/2020 10:28 AM CDT -----  Regarding: chart notes  Contact: Linda Chang  Calling for chart notes. FAX # [phone]  Office # [phone]

## 2020-11-02 ENCOUNTER — OFFICE VISIT (OUTPATIENT)
Dept: PODIATRY | Facility: CLINIC | Age: 65
End: 2020-11-02
Payer: MEDICARE

## 2020-11-02 ENCOUNTER — TELEPHONE (OUTPATIENT)
Dept: DIABETES | Facility: CLINIC | Age: 65
End: 2020-11-02

## 2020-11-02 VITALS
HEART RATE: 84 BPM | DIASTOLIC BLOOD PRESSURE: 93 MMHG | WEIGHT: 190 LBS | HEIGHT: 65 IN | SYSTOLIC BLOOD PRESSURE: 168 MMHG | BODY MASS INDEX: 31.65 KG/M2

## 2020-11-02 DIAGNOSIS — L97.522 ULCER OF LEFT FOOT, WITH FAT LAYER EXPOSED: Primary | ICD-10-CM

## 2020-11-02 DIAGNOSIS — T87.81 DEHISCENCE OF AMPUTATION STUMP: ICD-10-CM

## 2020-11-02 DIAGNOSIS — E11.49 TYPE II DIABETES MELLITUS WITH NEUROLOGICAL MANIFESTATIONS: ICD-10-CM

## 2020-11-02 PROCEDURE — 99499 NO LOS: ICD-10-PCS | Mod: HCNC,S$GLB,, | Performed by: PODIATRIST

## 2020-11-02 PROCEDURE — 11042 DBRDMT SUBQ TIS 1ST 20SQCM/<: CPT | Mod: HCNC,LT,S$GLB, | Performed by: PODIATRIST

## 2020-11-02 PROCEDURE — 99499 UNLISTED E&M SERVICE: CPT | Mod: HCNC,S$GLB,, | Performed by: PODIATRIST

## 2020-11-02 PROCEDURE — 99999 PR PBB SHADOW E&M-EST. PATIENT-LVL V: CPT | Mod: PBBFAC,HCNC,, | Performed by: PODIATRIST

## 2020-11-02 PROCEDURE — 99999 PR PBB SHADOW E&M-EST. PATIENT-LVL V: ICD-10-PCS | Mod: PBBFAC,HCNC,, | Performed by: PODIATRIST

## 2020-11-02 PROCEDURE — 11042 PR DEBRIDEMENT, SKIN, SUB-Q TISSUE,=<20 SQ CM: ICD-10-PCS | Mod: HCNC,LT,S$GLB, | Performed by: PODIATRIST

## 2020-11-02 NOTE — TELEPHONE ENCOUNTER
----- Message from Eduardo Castillo PA-C sent at 11/2/2020 11:11 AM CST -----  Please call patient and explain he needs to see CDE for Dexcom training before he sees me since I can't review data until he is seen. Please try to schedule a follow up with CDE and see if he would like to move back appt with me   ----- Message -----  From: Hanna Hull MA  Sent: 11/2/2020   9:20 AM CST  To: Eduardo Castillo PA-C    This patient missed the appts, think he'll make ours?  ----- Message -----  From: Eduardo Castillo PA-C  Sent: 10/25/2020   2:20 PM CST  To: Coni Reyes MA, Hanna Hull MA    I need to know why this patient was not scheduled for CDE - Dexcom training on 10/01 when he was checked out. I had check out note on paper that said Debbie Escobar - Dexcom training on 10/29 at 11:30 am. Patient has to go to  to get trained and needs to go on transportation day with another appy like [employer] on 10/29. If this was forwarded to CDE referral coordinator and not scheduled, I need to know to who so I can forward this to Jaquelin Rodriguez to address. We need to figure out how this is going to be scheduled before patient sees me on 11/5 for Dexcom review since I have no more openings through the end of the year in [facility].     Please respond in message so we have documentation.     Eduardo Castillo PA-C  Diabetes Management

## 2020-11-02 NOTE — PROGRESS NOTES
Subjective:     Patient ID: Crow Green is a 65 y.o. male.    Chief Complaint: Wound Check (rates pain 5/10. wears post op shoe with sock and dressing. diabetic Pt. PCP Dr. Lambert.)    Crow is a 65 y.o. male who presents to the clinic for evaluation and treatment of high risk feet. Crow has a past medical history of Arthritis, Asthma, Atrial fibrillation, Atrial fibrillation with RVR (8/7/2019), CHF (congestive heart failure), Chronic obstructive pulmonary disease (8/5/2020), Depression, Dermatomyositis, Diabetes mellitus, type 2 (Diagnosed in 2000), Elevated PSA, Follow up (7/30/2020), Hypertension, and Sleep apnea. The patient's chief complaint left foot ulcer. Patient states he kept dressing dry, clean, and intact since last appointment. Patient states his pain has eased up in the day time and only takes the pain medication at night time. This patient has documented high risk feet requiring routine maintenance secondary to diabetes mellitis and those secondary complications of diabetes, as mentioned..    PCP: Mateo Lambert, DO    Date Last Seen by PCP: 10/01/2200    Current shoe gear:  Affected Foot: Surgical boot     Unaffected Foot: Tennis shoes    History of Trauma: negative  Sign of Infection: none    Hemoglobin A1C   Date Value Ref Range Status   10/01/2020 6.6 (H) 4.0 - 5.6 % Final     Comment:     ADA Screening Guidelines:  5.7-6.4%  Consistent with prediabetes  >or=6.5%  Consistent with diabetes  High levels of fetal hemoglobin interfere with the HbA1C  assay. Heterozygous hemoglobin variants (HbS, HgC, etc)do  not significantly interfere with this assay.   However, presence of multiple variants may affect accuracy.     05/19/2020 7.7 (H) 4.0 - 5.6 % Final     Comment:     ADA Screening Guidelines:  5.7-6.4%  Consistent with prediabetes  >or=6.5%  Consistent with diabetes  High levels of fetal hemoglobin interfere with the HbA1C  assay. Heterozygous hemoglobin variants (HbS, HgC,  etc)do  not significantly interfere with this assay.   However, presence of multiple variants may affect accuracy.     12/13/2019 7.5 (H) 4.0 - 5.6 % Final     Comment:     ADA Screening Guidelines:  5.7-6.4%  Consistent with prediabetes  >or=6.5%  Consistent with diabetes  High levels of fetal hemoglobin interfere with the HbA1C  assay. Heterozygous hemoglobin variants (HbS, HgC, etc)do  not significantly interfere with this assay.   However, presence of multiple variants may affect accuracy.         Patient Active Problem List   Diagnosis    ED (erectile dysfunction)    Non-proliferative diabetic retinopathy, mild, both eyes    Hypertension associated with diabetes    Diabetic polyneuropathy    Nonobstructive coronary artery disease of native artery of native heart    Chronic combined systolic and diastolic HFrEF of 45%    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, with long-term current use of insulin    SANDRITA on CPAP    Moderate asthma    Psychophysiological insomnia    Nightmares    Sleep related gastroesophageal reflux disease    Inadequate sleep hygiene    PAF (paroxysmal atrial fibrillation)    At risk for medication noncompliance    Obesity (BMI 30.0-34.9)    Indeterminate stage chronic open angle glaucoma    Right hip pain    Gait instability    Chronic right shoulder pain    Arthritis    Left sided sciatica    Dyspnea on exertion    Osteoarthritis of spine with radiculopathy, lumbar region    HNP (herniated nucleus pulposus), lumbar    Gastroesophageal reflux disease without esophagitis    Chronic diastolic heart failure    Hypogonadism in male    Disorder of parathyroid gland    Hyperlipidemia associated with type 2 diabetes mellitus    JOSE MARIA (acute kidney injury)    LRTI (lower respiratory tract infection)    Physical deconditioning    Acute pain of left shoulder    Traumatic tear of left rotator cuff    Atrial  fibrillation with RVR    Other chronic pain    Left shoulder pain    Complete tear of left rotator cuff    Stage 3 chronic kidney disease    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 associated with type 2 diabetes mellitus    Lumbar radiculopathy    Diabetic ulcer of toe of left foot associated with type 2 diabetes mellitus, with necrosis of muscle    Ulcer of left foot    Elevated troponin    Hypotension due to medication    Dermatomyositis    Postprocedural hypotension    Follow up    Weakness generalized    Advanced directives, counseling/discussion    Cocaine dependence, in remission    Schizoaffective disorder, depressive type    Chronic obstructive pulmonary disease    Severe episode of recurrent major depressive disorder, without psychotic features    DOMINIK (generalized anxiety disorder)       Medication List with Changes/Refills   Current Medications    ALCOHOL SWABS (ALCOHOL PREP PADS) PADM    Apply 1 each topically once daily.    ALLOPURINOL (ZYLOPRIM) 100 MG TABLET    Take 1 tablet (100 mg total) by mouth once daily.    ASPIRIN (ECOTRIN) 81 MG EC TABLET    Take 1 tablet (81 mg total) by mouth once daily.    ATORVASTATIN (LIPITOR) 40 MG TABLET    TAKE 1 TABLET EVERY EVENING    AZATHIOPRINE (IMURAN) 50 MG TAB    Take 1 tablet (50 mg total) by mouth once daily.    BACLOFEN (LIORESAL) 10 MG TABLET    Take 1 tablet (10 mg total) by mouth once daily.    BLOOD SUGAR DIAGNOSTIC (TRUE METRIX GLUCOSE TEST STRIP) STRP    Use with blood glucose meter kit to test blood sugar four times  daily    BLOOD-GLUCOSE METER (TRUE METRIX AIR GLUCOSE METER) KIT    TEST FOUR TIMES DAILY BEFORE MEALS  AND EVERY NIGHT    COLCRYS 0.6 MG TABLET    TAKE 1 TABLET EVERY DAY    DICLOFENAC SODIUM (VOLTAREN) 1 % GEL    Apply 2 g topically 4 (four) times daily.    DOXYCYCLINE (VIBRAMYCIN) 100 MG CAP    Take 1 capsule (100 mg total) by mouth 2 (two) times daily.    ELIQUIS 5 MG TAB     TAKE 1 TABLET TWICE DAILY    FLUOCINONIDE (LIDEX) 0.05 % EXTERNAL SOLUTION    Apply topically 2 (two) times daily. Use on scalp.    FLUTICASONE-SALMETEROL DISKUS INHALER 250-50 MCG    Inhale 1 puff into the lungs 2 (two) times daily. Controller    FOOD SUPPLEMT, LACTOSE-REDUCED (ENSURE) LIQD    Take 118 mLs by mouth 3 (three) times daily with meals.    FUROSEMIDE (LASIX) 40 MG TABLET    TAKE 2 TABLETS EVERY MORNING  AND TAKE 1 TABLET EVERY EVENING    GABAPENTIN (NEURONTIN) 800 MG TABLET    Take 1 tablet (800 mg total) by mouth 3 (three) times daily.    GLIMEPIRIDE (AMARYL) 2 MG TABLET    TAKE 1 TABLET (2 MG TOTAL) BY MOUTH BEFORE BREAKFAST.    HYDROCODONE-ACETAMINOPHEN (NORCO)  MG PER TABLET    Take 1 tablet by mouth every 8 (eight) hours as needed for Pain.    INHALATION SPACING DEVICE    Use as directed for inhalation.    INSULIN (LANTUS SOLOSTAR U-100 INSULIN) GLARGINE 100 UNITS/ML (3ML) SUBQ PEN    INJECT 35 UNITS SUBCUTANEOUSLY EVERY EVENING    INSULIN ASPART U-100 (NOVOLOG FLEXPEN U-100 INSULIN) 100 UNIT/ML (3 ML) INPN PEN    INJECT 10 UNITS SUBCUTANEOUSLY THREE TIMES DAILY WITH MEALS    INSULIN SYRINGE-NEEDLE U-100 1 ML 31 GAUGE X 5/16 SYRG        IPRATROPIUM (ATROVENT) 0.02 % NEBULIZER SOLUTION    USE 1 VIAL VIA NEBULIZER FOUR TIMES DAILY    ISOSORBIDE MONONITRATE (IMDUR) 60 MG 24 HR TABLET    Take 1 tablet (60 mg total) by mouth once daily.    LANCETS 32 GAUGE MISC    1 lancet by Misc.(Non-Drug; Combo Route) route 2 (two) times daily.    LATANOPROST 0.005 % OPHTHALMIC SOLUTION    INSTILL 1 DROP INTO BOTH EYES EVERY EVENING    LISINOPRIL 10 MG TABLET        MAGNESIUM OXIDE (MAG-OX) 400 MG (241.3 MG MAGNESIUM) TABLET    Take 1 tablet (400 mg total) by mouth once daily.    METFORMIN (GLUCOPHAGE-XR) 500 MG ER 24HR TABLET    Take 2 tablets (1,000 mg total) by mouth 2 (two) times daily with meals.    METOPROLOL SUCCINATE (TOPROL-XL) 50 MG 24 HR TABLET    Take 1 tablet (50 mg total) by mouth once daily.     "NITROGLYCERIN (NITROSTAT) 0.3 MG SL TABLET    PLACE 1 TABLET UNDER THE TONGUE EVERY 5 MINUTES AS NEEDED FOR CHEST PAIN, NO MORE THAN 3 TABLETS IN ONE DAY    OZEMPIC 1 MG/DOSE (2 MG/1.5 ML) PNIJ    Inject 0.75 mLs into the skin once a week.    PANTOPRAZOLE (PROTONIX) 40 MG TABLET    Take 1 tablet (40 mg total) by mouth once daily.    PEN NEEDLE, DIABETIC (BD ULTRA-FINE SHORT PEN NEEDLE) 31 GAUGE X 5/16" NDLE    Inject 1 each into the skin 3 (three) times daily.    PREDNISONE (DELTASONE) 5 MG TABLET    Take 1 tablet (5 mg total) by mouth 2 (two) times daily.    SERTRALINE (ZOLOFT) 100 MG TABLET    Take 1 tablet (100 mg total) by mouth every evening.    SILVER SULFADIAZINE 1% (SILVADENE) 1 % CREAM    Apply topically 2 (two) times daily.    TAMSULOSIN (FLOMAX) 0.4 MG CAP    Take 1 capsule (0.4 mg total) by mouth once daily.    TIOTROPIUM (SPIRIVA WITH HANDIHALER) 18 MCG INHALATION CAPSULE    Inhale 1 capsule (18 mcg total) into the lungs once daily. Controller    TRAZODONE (DESYREL) 100 MG TABLET    Take 2 tablets (200 mg total) by mouth every evening.    TRIAMCINOLONE ACETONIDE 0.1% (KENALOG) 0.1 % OINTMENT    Apply topically 2 (two) times daily. Use on legs and back    VIOS AEROSOL DELIVERY SYSTEM VIN    USE AS DIRESTED       Review of patient's allergies indicates:   Allergen Reactions    Protamine Hives     Urticaria, possible upper airway swelling       Past Surgical History:   Procedure Laterality Date    ABLATION OF ARRHYTHMOGENIC FOCUS FOR ATRIAL FIBRILLATION N/A 2/27/2020    Procedure: Ablation atrial fibrillation;  Surgeon: Gio Brown MD;  Location: [facility] EP LAB;  Service: Cardiology;  Laterality: N/A;  afib, DAMIEN (cx if SR), PVI, PITO, anes, MB, 3 Prep    CHOLECYSTECTOMY      CLOSURE OF WOUND Left 7/1/2020    Procedure: CLOSURE, WOUND;  Surgeon: Barb Versa DPM;  [facility] OR;  Service: Podiatry;  Laterality: Left;    SELECTIVE INJECTION OF ANESTHETIC AGENT AROUND LUMBAR SPINAL NERVE " ROOT BY TRANSFORAMINAL APPROACH Left 6/11/2020    Procedure: BLOCK, SPINAL NERVE ROOT, LUMBAR, SELECTIVE, TRANSFORAMINAL APPROACH Left L4-5, L5-S1 TFESI with RN IV sedation;  Surgeon: Dillan Tsai MD;  [facility];  Service: Pain Management;  Laterality: Left;    TOE AMPUTATION Left 6/29/2020    Procedure: AMPUTATION, TOE;  Surgeon: Barb Veras DPM;  Location[facility];  Service: Podiatry;  Laterality: Left;  great toe       Family History   Problem Relation Age of Onset    Glaucoma Mother     Cataracts Mother     Cataracts Father     Glaucoma Sister     Cataracts Sister     Glaucoma Maternal Aunt     No Known Problems Brother     No Known Problems Daughter     No Known Problems Son     Prostate cancer Neg Hx        Social History     Socioeconomic History    Marital status: Legally      Spouse name: Not on file    Number of children: 5    Years of education: Not on file    Highest education level: Not on file   Occupational History    Occupation: disabled   Social Needs    Financial resource strain: Very hard    Food insecurity     Worry: Often true     Inability: Often true    Transportation needs     Medical: Yes     Non-medical: Yes   Tobacco Use    Smoking status: Never Smoker    Smokeless tobacco: Never Used   Substance and Sexual Activity    Alcohol use: Not Currently    Drug use: No    Sexual activity: Yes     Partners: Female   Lifestyle    Physical activity     Days per week: 0 days     Minutes per session: 0 min    Stress: Rather much   Relationships    Social connections     Talks on phone: Three times a week     Gets together: Never     Attends Gnosticism service: More than 4 times per year     Active member of club or organization: No     Attends meetings of clubs or organizations: Never     Relationship status:    Other Topics Concern    Not on file   Social History Narrative    Utilizing [employer] transportation which has been unreliable.   "      Vitals:    11/02/20 0832   BP: (!) 168/93   Pulse: 84   Weight: 86.2 kg (190 lb)   Height: 5' 5" (1.651 m)   PainSc:   5   PainLoc: Foot       Hemoglobin A1C   Date Value Ref Range Status   10/01/2020 6.6 (H) 4.0 - 5.6 % Final     Comment:     ADA Screening Guidelines:  5.7-6.4%  Consistent with prediabetes  >or=6.5%  Consistent with diabetes  High levels of fetal hemoglobin interfere with the HbA1C  assay. Heterozygous hemoglobin variants (HbS, HgC, etc)do  not significantly interfere with this assay.   However, presence of multiple variants may affect accuracy.     05/19/2020 7.7 (H) 4.0 - 5.6 % Final     Comment:     ADA Screening Guidelines:  5.7-6.4%  Consistent with prediabetes  >or=6.5%  Consistent with diabetes  High levels of fetal hemoglobin interfere with the HbA1C  assay. Heterozygous hemoglobin variants (HbS, HgC, etc)do  not significantly interfere with this assay.   However, presence of multiple variants may affect accuracy.     12/13/2019 7.5 (H) 4.0 - 5.6 % Final     Comment:     ADA Screening Guidelines:  5.7-6.4%  Consistent with prediabetes  >or=6.5%  Consistent with diabetes  High levels of fetal hemoglobin interfere with the HbA1C  assay. Heterozygous hemoglobin variants (HbS, HgC, etc)do  not significantly interfere with this assay.   However, presence of multiple variants may affect accuracy.         Review of Systems   Constitutional: Negative for chills and fever.   Respiratory: Negative for shortness of breath.    Cardiovascular: Negative for chest pain, palpitations, orthopnea, claudication and leg swelling.   Gastrointestinal: Negative for diarrhea, nausea and vomiting.   Musculoskeletal: Negative for joint pain.   Skin: Negative for rash.   Neurological: Positive for tingling and sensory change.   Psychiatric/Behavioral: Negative.            Objective:      PHYSICAL EXAM: Apperance: Alert and orient in no distress,well developed, and with good attention to grooming and body " habits  Patient presents ambulating in surgical shoe and dressing to left foot and tennis shoe on the right.   Lower Extremity Exam  VASCULAR: Dorsalis pedis pulses 2/4 left and Posterior Tibial pulses 2/4 left. Capillary fill time <4 seconds left. No edema observed left. Varicosities present left. Skin temperature of the lower extremities is warm to warm, proximal to distal. Hair growth dim left. (--) lymphangitis or (--) cellulitis noted left.  DERMATOLOGICAL: (--) edema, (--) erythema, (--) malodor, (+)  drainage, (+) warmth to left foot. Ulcer measurement (post) 0.3cm x 0.3cm x 0.1cm.  The ulcer does not extend into deeper tissue and (--) sinus tracts exist.  Ulcer noted to left medial 1st metatarsal head  with granular base and with a 1 mm yellow/white border and hyperkeratosis surrounding ulcer. Ulcer measurement (post) 0.2cm x 0.1cm x 0.1cm.  The ulcer does not extend into deeper tissue and (--) sinus tracts exist. The dorsum surface of the feet are soft and supple.  The plantar aspects of feet are dry and scaly. Webspaces 2,3,4 clean, dry and without evidence of break in skin integrity left.   NEUROLOGICAL: Light touch, sharp-dull, proprioception all present and equal bilaterally. Protective sensation absent sites as tested with a Gillett-Kimmie 5.07 monofilament.   MUSCULOSKELETAL: Muscle strength is 5/5 for foot inverters, everters, plantarflexors, and dorsiflexors. Muscle tone is normal. Left hallux amputation noted.                   Assessment:       Encounter Diagnoses   Name Primary?    Ulcer of left foot, with fat layer exposed Yes    Dehiscence of amputation stump - Left Foot     Type II diabetes mellitus with neurological manifestations          Plan:   Ulcer of left foot, with fat layer exposed    Dehiscence of amputation stump - Left Foot    Type II diabetes mellitus with neurological manifestations      I counseled the patient on his conditions, regarding findings of my examination, my  impressions, and usual treatment plan.   Left medial 1st metatarsal ulcer sharply excisionally debrided of viable and nonviable tissue from the wound periphery and base. I redefined the wound borders in the process. Debridement was carried into and did include the subcutaneous layer down to good bleeding granular tissue base utilizing sterile #15 blade and tissue nipper and forceps. Wound was irrigated with sterile saline and bleeding was controlled with direct pressure. Minimal blood loss.  Post debridement measurements are contained in the above progress note. The patient tolerated this well. Wound was then dressed with Sarah and dressed with gauze, kerlix, and ACE.   Patient instructed to keep dressing dry, clean and intact until home health comes out.   Home health orders competed and sent to [facility]. And to change dressing on Fridays and Mondays.   Patient instructed to continue to ambulate in surgical shoe and walker.   Refill completed for Norco 10-325mg to be taken as needed for pain. Patient advised on the possible elevation of blood pressure or heart effects and caution to take pills as needed and to discontinue use if symptoms arise, patient agreed.   Patient should call the clinic immediately if any signs of infection such as fever chills sweats increased redness or pain.  Patient to return in 1 week or sooner if needed.            Barb Veras DPM  [facility]

## 2020-11-05 ENCOUNTER — PATIENT MESSAGE (OUTPATIENT)
Dept: PHARMACY | Facility: CLINIC | Age: 65
End: 2020-11-05

## 2020-11-05 DIAGNOSIS — L97.522 ULCER OF LEFT FOOT, WITH FAT LAYER EXPOSED: ICD-10-CM

## 2020-11-05 DIAGNOSIS — T87.81 DEHISCENCE OF AMPUTATION STUMP: ICD-10-CM

## 2020-11-05 DIAGNOSIS — Z89.412 STATUS POST AMPUTATION OF LEFT GREAT TOE: ICD-10-CM

## 2020-11-05 DIAGNOSIS — G89.18 ACUTE POST-OPERATIVE PAIN: ICD-10-CM

## 2020-11-05 RX ORDER — HYDROCODONE BITARTRATE AND ACETAMINOPHEN 10; 325 MG/1; MG/1
1 TABLET ORAL EVERY 8 HOURS PRN
Qty: 20 TABLET | Refills: 0 | Status: SHIPPED | OUTPATIENT
Start: 2020-11-05 | End: 2020-11-10 | Stop reason: SDUPTHER

## 2020-11-06 ENCOUNTER — TELEPHONE (OUTPATIENT)
Dept: INTERNAL MEDICINE | Facility: CLINIC | Age: 65
End: 2020-11-06

## 2020-11-06 NOTE — TELEPHONE ENCOUNTER
----- Message from Skylar Whitt sent at 11/6/2020  1:43 PM CST -----  Please call pt @ [phone] regarding order for wheelchair, pt states he never received chair.

## 2020-11-09 ENCOUNTER — OFFICE VISIT (OUTPATIENT)
Dept: PODIATRY | Facility: CLINIC | Age: 65
End: 2020-11-09
Payer: MEDICARE

## 2020-11-09 ENCOUNTER — TELEPHONE (OUTPATIENT)
Dept: PRIMARY CARE CLINIC | Facility: CLINIC | Age: 65
End: 2020-11-09

## 2020-11-09 VITALS
WEIGHT: 190 LBS | DIASTOLIC BLOOD PRESSURE: 86 MMHG | HEIGHT: 65 IN | SYSTOLIC BLOOD PRESSURE: 136 MMHG | BODY MASS INDEX: 31.65 KG/M2

## 2020-11-09 DIAGNOSIS — E11.49 TYPE II DIABETES MELLITUS WITH NEUROLOGICAL MANIFESTATIONS: ICD-10-CM

## 2020-11-09 DIAGNOSIS — G89.18 ACUTE POST-OPERATIVE PAIN: ICD-10-CM

## 2020-11-09 DIAGNOSIS — T87.81 DEHISCENCE OF AMPUTATION STUMP: ICD-10-CM

## 2020-11-09 DIAGNOSIS — L97.522 ULCER OF LEFT FOOT, WITH FAT LAYER EXPOSED: Primary | ICD-10-CM

## 2020-11-09 PROCEDURE — 11042 PR DEBRIDEMENT, SKIN, SUB-Q TISSUE,=<20 SQ CM: ICD-10-PCS | Mod: HCNC,LT,S$GLB, | Performed by: PODIATRIST

## 2020-11-09 PROCEDURE — 99999 PR PBB SHADOW E&M-EST. PATIENT-LVL V: ICD-10-PCS | Mod: PBBFAC,HCNC,, | Performed by: PODIATRIST

## 2020-11-09 PROCEDURE — 11042 DBRDMT SUBQ TIS 1ST 20SQCM/<: CPT | Mod: HCNC,LT,S$GLB, | Performed by: PODIATRIST

## 2020-11-09 PROCEDURE — 99999 PR PBB SHADOW E&M-EST. PATIENT-LVL V: CPT | Mod: PBBFAC,HCNC,, | Performed by: PODIATRIST

## 2020-11-09 PROCEDURE — 99499 NO LOS: ICD-10-PCS | Mod: HCNC,S$GLB,, | Performed by: PODIATRIST

## 2020-11-09 PROCEDURE — 99499 UNLISTED E&M SERVICE: CPT | Mod: HCNC,S$GLB,, | Performed by: PODIATRIST

## 2020-11-09 NOTE — PROGRESS NOTES
CHW contacted Mr. Green to remind patient of his appointment tomorrow with Awilda Nogueira LCSW. Patient confirmed the appointment.

## 2020-11-10 ENCOUNTER — CLINICAL SUPPORT (OUTPATIENT)
Dept: PRIMARY CARE CLINIC | Facility: CLINIC | Age: 65
End: 2020-11-10
Payer: MEDICARE

## 2020-11-10 ENCOUNTER — TELEPHONE (OUTPATIENT)
Dept: PRIMARY CARE CLINIC | Facility: CLINIC | Age: 65
End: 2020-11-10

## 2020-11-10 PROCEDURE — 99499 NO LOS: ICD-10-PCS | Mod: BHI,95,, | Performed by: SOCIAL WORKER

## 2020-11-10 PROCEDURE — 99499 UNLISTED E&M SERVICE: CPT | Mod: BHI,95,, | Performed by: SOCIAL WORKER

## 2020-11-10 RX ORDER — HYDROCODONE BITARTRATE AND ACETAMINOPHEN 10; 325 MG/1; MG/1
1 TABLET ORAL EVERY 8 HOURS PRN
Qty: 20 TABLET | Refills: 0 | Status: SHIPPED | OUTPATIENT
Start: 2020-11-12 | End: 2020-11-16 | Stop reason: SDUPTHER

## 2020-11-10 NOTE — PROGRESS NOTES
Subjective:     Patient ID: Crow Green is a 65 y.o. male.    Chief Complaint: Wound Care (c/o pain to left foot. rates pain 7/10. wears post op shoe with dressing. diabetic Pt. PCP Dr. Lambert.)    Crow is a 65 y.o. male who presents to the clinic for evaluation and treatment of high risk feet. Crow has a past medical history of Arthritis, Asthma, Atrial fibrillation, Atrial fibrillation with RVR (8/7/2019), CHF (congestive heart failure), Chronic obstructive pulmonary disease (8/5/2020), Depression, Dermatomyositis, Diabetes mellitus, type 2 (Diagnosed in 2000), Elevated PSA, Follow up (7/30/2020), Hypertension, and Sleep apnea. The patient's chief complaint left foot ulcer. Patient states he kept dressing dry, clean, and intact since last appointment. Patient states his pain has eased up in the day time and only takes the pain medication at night time. This patient has documented high risk feet requiring routine maintenance secondary to diabetes mellitis and those secondary complications of diabetes, as mentioned..    PCP: Mateo Lambert, DO    Date Last Seen by PCP: 10/01/2200    Current shoe gear:  Affected Foot: Surgical boot     Unaffected Foot: Tennis shoes    History of Trauma: negative  Sign of Infection: none    Hemoglobin A1C   Date Value Ref Range Status   10/01/2020 6.6 (H) 4.0 - 5.6 % Final     Comment:     ADA Screening Guidelines:  5.7-6.4%  Consistent with prediabetes  >or=6.5%  Consistent with diabetes  High levels of fetal hemoglobin interfere with the HbA1C  assay. Heterozygous hemoglobin variants (HbS, HgC, etc)do  not significantly interfere with this assay.   However, presence of multiple variants may affect accuracy.     05/19/2020 7.7 (H) 4.0 - 5.6 % Final     Comment:     ADA Screening Guidelines:  5.7-6.4%  Consistent with prediabetes  >or=6.5%  Consistent with diabetes  High levels of fetal hemoglobin interfere with the HbA1C  assay. Heterozygous hemoglobin variants (HbS,  HgC, etc)do  not significantly interfere with this assay.   However, presence of multiple variants may affect accuracy.     12/13/2019 7.5 (H) 4.0 - 5.6 % Final     Comment:     ADA Screening Guidelines:  5.7-6.4%  Consistent with prediabetes  >or=6.5%  Consistent with diabetes  High levels of fetal hemoglobin interfere with the HbA1C  assay. Heterozygous hemoglobin variants (HbS, HgC, etc)do  not significantly interfere with this assay.   However, presence of multiple variants may affect accuracy.         Patient Active Problem List   Diagnosis    ED (erectile dysfunction)    Non-proliferative diabetic retinopathy, mild, both eyes    Hypertension associated with diabetes    Diabetic polyneuropathy    Nonobstructive coronary artery disease of native artery of native heart    Chronic combined systolic and diastolic HFrEF of 45%    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, with long-term current use of insulin    SANDRITA on CPAP    Moderate asthma    Psychophysiological insomnia    Nightmares    Sleep related gastroesophageal reflux disease    Inadequate sleep hygiene    PAF (paroxysmal atrial fibrillation)    At risk for medication noncompliance    Obesity (BMI 30.0-34.9)    Indeterminate stage chronic open angle glaucoma    Right hip pain    Gait instability    Chronic right shoulder pain    Arthritis    Left sided sciatica    Dyspnea on exertion    Osteoarthritis of spine with radiculopathy, lumbar region    HNP (herniated nucleus pulposus), lumbar    Gastroesophageal reflux disease without esophagitis    Chronic diastolic heart failure    Hypogonadism in male    Disorder of parathyroid gland    Hyperlipidemia associated with type 2 diabetes mellitus    JOSE MARIA (acute kidney injury)    LRTI (lower respiratory tract infection)    Physical deconditioning    Acute pain of left shoulder    Traumatic tear of left rotator cuff    Atrial  fibrillation with RVR    Other chronic pain    Left shoulder pain    Complete tear of left rotator cuff    Stage 3 chronic kidney disease    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 associated with type 2 diabetes mellitus    Lumbar radiculopathy    Diabetic ulcer of toe of left foot associated with type 2 diabetes mellitus, with necrosis of muscle    Ulcer of left foot    Elevated troponin    Hypotension due to medication    Dermatomyositis    Postprocedural hypotension    Follow up    Weakness generalized    Advanced directives, counseling/discussion    Cocaine dependence, in remission    Schizoaffective disorder, depressive type    Chronic obstructive pulmonary disease    Severe episode of recurrent major depressive disorder, without psychotic features    DOMINIK (generalized anxiety disorder)       Medication List with Changes/Refills   Current Medications    ALCOHOL SWABS (ALCOHOL PREP PADS) PADM    Apply 1 each topically once daily.    ALLOPURINOL (ZYLOPRIM) 100 MG TABLET    Take 1 tablet (100 mg total) by mouth once daily.    ASPIRIN (ECOTRIN) 81 MG EC TABLET    Take 1 tablet (81 mg total) by mouth once daily.    ATORVASTATIN (LIPITOR) 40 MG TABLET    TAKE 1 TABLET EVERY EVENING    AZATHIOPRINE (IMURAN) 50 MG TAB    Take 1 tablet (50 mg total) by mouth once daily.    BACLOFEN (LIORESAL) 10 MG TABLET    Take 1 tablet (10 mg total) by mouth once daily.    BLOOD SUGAR DIAGNOSTIC (TRUE METRIX GLUCOSE TEST STRIP) STRP    Use with blood glucose meter kit to test blood sugar four times  daily    BLOOD-GLUCOSE METER (TRUE METRIX AIR GLUCOSE METER) KIT    TEST FOUR TIMES DAILY BEFORE MEALS  AND EVERY NIGHT    COLCRYS 0.6 MG TABLET    TAKE 1 TABLET EVERY DAY    DICLOFENAC SODIUM (VOLTAREN) 1 % GEL    Apply 2 g topically 4 (four) times daily.    DOXYCYCLINE (VIBRAMYCIN) 100 MG CAP    Take 1 capsule (100 mg total) by mouth 2 (two) times daily.    ELIQUIS 5 MG TAB     TAKE 1 TABLET TWICE DAILY    FLUOCINONIDE (LIDEX) 0.05 % EXTERNAL SOLUTION    Apply topically 2 (two) times daily. Use on scalp.    FLUTICASONE-SALMETEROL DISKUS INHALER 250-50 MCG    Inhale 1 puff into the lungs 2 (two) times daily. Controller    FOOD SUPPLEMT, LACTOSE-REDUCED (ENSURE) LIQD    Take 118 mLs by mouth 3 (three) times daily with meals.    FUROSEMIDE (LASIX) 40 MG TABLET    TAKE 2 TABLETS EVERY MORNING  AND TAKE 1 TABLET EVERY EVENING    GABAPENTIN (NEURONTIN) 800 MG TABLET    Take 1 tablet (800 mg total) by mouth 3 (three) times daily.    GLIMEPIRIDE (AMARYL) 2 MG TABLET    TAKE 1 TABLET (2 MG TOTAL) BY MOUTH BEFORE BREAKFAST.    INHALATION SPACING DEVICE    Use as directed for inhalation.    INSULIN (LANTUS SOLOSTAR U-100 INSULIN) GLARGINE 100 UNITS/ML (3ML) SUBQ PEN    INJECT 35 UNITS SUBCUTANEOUSLY EVERY EVENING    INSULIN ASPART U-100 (NOVOLOG FLEXPEN U-100 INSULIN) 100 UNIT/ML (3 ML) INPN PEN    INJECT 10 UNITS SUBCUTANEOUSLY THREE TIMES DAILY WITH MEALS    INSULIN SYRINGE-NEEDLE U-100 1 ML 31 GAUGE X 5/16 SYRG        IPRATROPIUM (ATROVENT) 0.02 % NEBULIZER SOLUTION    USE 1 VIAL VIA NEBULIZER FOUR TIMES DAILY    ISOSORBIDE MONONITRATE (IMDUR) 60 MG 24 HR TABLET    Take 1 tablet (60 mg total) by mouth once daily.    LANCETS 32 GAUGE MISC    1 lancet by Misc.(Non-Drug; Combo Route) route 2 (two) times daily.    LATANOPROST 0.005 % OPHTHALMIC SOLUTION    INSTILL 1 DROP INTO BOTH EYES EVERY EVENING    LISINOPRIL 10 MG TABLET        MAGNESIUM OXIDE (MAG-OX) 400 MG (241.3 MG MAGNESIUM) TABLET    Take 1 tablet (400 mg total) by mouth once daily.    METFORMIN (GLUCOPHAGE-XR) 500 MG ER 24HR TABLET    Take 2 tablets (1,000 mg total) by mouth 2 (two) times daily with meals.    METOPROLOL SUCCINATE (TOPROL-XL) 50 MG 24 HR TABLET    Take 1 tablet (50 mg total) by mouth once daily.    NITROGLYCERIN (NITROSTAT) 0.3 MG SL TABLET    PLACE 1 TABLET UNDER THE TONGUE EVERY 5 MINUTES AS NEEDED FOR CHEST PAIN, NO MORE  "THAN 3 TABLETS IN ONE DAY    OZEMPIC 1 MG/DOSE (2 MG/1.5 ML) PNIJ    Inject 0.75 mLs into the skin once a week.    PANTOPRAZOLE (PROTONIX) 40 MG TABLET    Take 1 tablet (40 mg total) by mouth once daily.    PEN NEEDLE, DIABETIC (BD ULTRA-FINE SHORT PEN NEEDLE) 31 GAUGE X 5/16" NDLE    Inject 1 each into the skin 3 (three) times daily.    PREDNISONE (DELTASONE) 5 MG TABLET    Take 1 tablet (5 mg total) by mouth 2 (two) times daily.    SERTRALINE (ZOLOFT) 100 MG TABLET    Take 1 tablet (100 mg total) by mouth every evening.    SILVER SULFADIAZINE 1% (SILVADENE) 1 % CREAM    Apply topically 2 (two) times daily.    TAMSULOSIN (FLOMAX) 0.4 MG CAP    Take 1 capsule (0.4 mg total) by mouth once daily.    TIOTROPIUM (SPIRIVA WITH HANDIHALER) 18 MCG INHALATION CAPSULE    Inhale 1 capsule (18 mcg total) into the lungs once daily. Controller    TRAZODONE (DESYREL) 100 MG TABLET    Take 2 tablets (200 mg total) by mouth every evening.    TRIAMCINOLONE ACETONIDE 0.1% (KENALOG) 0.1 % OINTMENT    Apply topically 2 (two) times daily. Use on legs and back    VIOS AEROSOL DELIVERY SYSTEM VIN    USE AS DIRESTED   Changed and/or Refilled Medications    Modified Medication Previous Medication    HYDROCODONE-ACETAMINOPHEN (NORCO)  MG PER TABLET HYDROcodone-acetaminophen (NORCO)  mg per tablet       Take 1 tablet by mouth every 8 (eight) hours as needed for Pain.    Take 1 tablet by mouth every 8 (eight) hours as needed for Pain.       Review of patient's allergies indicates:   Allergen Reactions    Protamine Hives     Urticaria, possible upper airway swelling       Past Surgical History:   Procedure Laterality Date    ABLATION OF ARRHYTHMOGENIC FOCUS FOR ATRIAL FIBRILLATION N/A 2/27/2020    Procedure: Ablation atrial fibrillation;  Surgeon: Gio Brown MD;  Location: [facility];  Service: Cardiology;  Laterality: N/A;  afib, DAMIEN (cx if SR), PVI, PITO, anes, MB, 3 Prep    CHOLECYSTECTOMY      CLOSURE OF WOUND Left " 7/1/2020    Procedure: CLOSURE, WOUND;  Surgeon: Barb Veras DPM;  Location: [facility] OR;  Service: Podiatry;  Laterality: Left;    SELECTIVE INJECTION OF ANESTHETIC AGENT AROUND LUMBAR SPINAL NERVE ROOT BY TRANSFORAMINAL APPROACH Left 6/11/2020    Procedure: BLOCK, SPINAL NERVE ROOT, LUMBAR, SELECTIVE, TRANSFORAMINAL APPROACH Left L4-5, L5-S1 TFESI with RN IV sedation;  Surgeon: Dillan Tsai MD;  Location: [facility];  Service: Pain Management;  Laterality: Left;    TOE AMPUTATION Left 6/29/2020    Procedure: AMPUTATION, TOE;  Surgeon: Barb Veras DPM;  Location: [facility] OR;  Service: Podiatry;  Laterality: Left;  great toe       Family History   Problem Relation Age of Onset    Glaucoma Mother     Cataracts Mother     Cataracts Father     Glaucoma Sister     Cataracts Sister     Glaucoma Maternal Aunt     No Known Problems Brother     No Known Problems Daughter     No Known Problems Son     Prostate cancer Neg Hx        Social History     Socioeconomic History    Marital status: Legally      Spouse name: Not on file    Number of children: 5    Years of education: Not on file    Highest education level: Not on file   Occupational History    Occupation: disabled   Social Needs    Financial resource strain: Very hard    Food insecurity     Worry: Often true     Inability: Often true    Transportation needs     Medical: Yes     Non-medical: Yes   Tobacco Use    Smoking status: Never Smoker    Smokeless tobacco: Never Used   Substance and Sexual Activity    Alcohol use: Not Currently    Drug use: No    Sexual activity: Yes     Partners: Female   Lifestyle    Physical activity     Days per week: 0 days     Minutes per session: 0 min    Stress: Rather much   Relationships    Social connections     Talks on phone: Three times a week     Gets together: Never     Attends Yarsanism service: More than 4 times per year     Active member of club or organization: No     Attends meetings  "of clubs or organizations: Never     Relationship status:    Other Topics Concern    Not on file   Social History Narrative    Utilizing [employer] transportation which has been unreliable.        Vitals:    11/09/20 1315   BP: 136/86   Weight: 86.2 kg (190 lb)   Height: 5' 5" (1.651 m)   PainSc:   7   PainLoc: Foot       Hemoglobin A1C   Date Value Ref Range Status   10/01/2020 6.6 (H) 4.0 - 5.6 % Final     Comment:     ADA Screening Guidelines:  5.7-6.4%  Consistent with prediabetes  >or=6.5%  Consistent with diabetes  High levels of fetal hemoglobin interfere with the HbA1C  assay. Heterozygous hemoglobin variants (HbS, HgC, etc)do  not significantly interfere with this assay.   However, presence of multiple variants may affect accuracy.     05/19/2020 7.7 (H) 4.0 - 5.6 % Final     Comment:     ADA Screening Guidelines:  5.7-6.4%  Consistent with prediabetes  >or=6.5%  Consistent with diabetes  High levels of fetal hemoglobin interfere with the HbA1C  assay. Heterozygous hemoglobin variants (HbS, HgC, etc)do  not significantly interfere with this assay.   However, presence of multiple variants may affect accuracy.     12/13/2019 7.5 (H) 4.0 - 5.6 % Final     Comment:     ADA Screening Guidelines:  5.7-6.4%  Consistent with prediabetes  >or=6.5%  Consistent with diabetes  High levels of fetal hemoglobin interfere with the HbA1C  assay. Heterozygous hemoglobin variants (HbS, HgC, etc)do  not significantly interfere with this assay.   However, presence of multiple variants may affect accuracy.         Review of Systems   Constitutional: Negative for chills and fever.   Respiratory: Negative for shortness of breath.    Cardiovascular: Negative for chest pain, palpitations, orthopnea, claudication and leg swelling.   Gastrointestinal: Negative for diarrhea, nausea and vomiting.   Musculoskeletal: Negative for joint pain.   Skin: Negative for rash.   Neurological: Positive for tingling and sensory change. "   Psychiatric/Behavioral: Negative.            Objective:      PHYSICAL EXAM: Apperance: Alert and orient in no distress,well developed, and with good attention to grooming and body habits  Patient presents ambulating in surgical shoe and dressing to left foot and tennis shoe on the right.   Lower Extremity Exam  VASCULAR: Dorsalis pedis pulses 2/4 left and Posterior Tibial pulses 2/4 left. Capillary fill time <4 seconds left. No edema observed left. Varicosities present left. Skin temperature of the lower extremities is warm to warm, proximal to distal. Hair growth dim left. (--) lymphangitis or (--) cellulitis noted left.  DERMATOLOGICAL: (--) edema, (--) erythema, (--) malodor, (+) decreased drainage, (+) decreased warmth to left foot. Ulcer measurement (post) 0.2cm x 0.2cm x 0.1cm.  The ulcer does not extend into deeper tissue and (--) sinus tracts exist.  Previous ulcer noted to left medial 1st metatarsal head closed with thin epithealized tissue. The dorsum surface of the feet are soft and supple.  The plantar aspects of feet are dry and scaly. Webspaces 2,3,4 clean, dry and without evidence of break in skin integrity left.   NEUROLOGICAL: Light touch, sharp-dull, proprioception all present and equal bilaterally. Protective sensation absent sites as tested with a [place] 5.07 monofilament.   MUSCULOSKELETAL: Muscle strength is 5/5 for foot inverters, everters, plantarflexors, and dorsiflexors. Muscle tone is normal. Left hallux amputation noted.           Assessment:       Encounter Diagnoses   Name Primary?    Ulcer of left foot, with fat layer exposed - Left Foot Yes    Dehiscence of amputation stump - Left Foot     Acute post-operative pain - Left Foot     Type II diabetes mellitus with neurological manifestations          Plan:   Ulcer of left foot, with fat layer exposed - Left Foot  -     HYDROcodone-acetaminophen (NORCO)  mg per tablet; Take 1 tablet by mouth every 8 (eight) hours as  needed for Pain.  Dispense: 20 tablet; Refill: 0    Dehiscence of amputation stump - Left Foot  -     HYDROcodone-acetaminophen (NORCO)  mg per tablet; Take 1 tablet by mouth every 8 (eight) hours as needed for Pain.  Dispense: 20 tablet; Refill: 0    Acute post-operative pain - Left Foot  -     HYDROcodone-acetaminophen (NORCO)  mg per tablet; Take 1 tablet by mouth every 8 (eight) hours as needed for Pain.  Dispense: 20 tablet; Refill: 0    Type II diabetes mellitus with neurological manifestations      I counseled the patient on his conditions, regarding findings of my examination, my impressions, and usual treatment plan.   Left medial 1st metatarsal ulcer sharply excisionally debrided of viable and nonviable tissue from the wound periphery and base. I redefined the wound borders in the process. Debridement was carried into and did include the subcutaneous layer down to good bleeding granular tissue base utilizing sterile #15 blade and tissue nipper and forceps. Wound was irrigated with sterile saline and bleeding was controlled with direct pressure. Minimal blood loss.  Post debridement measurements are contained in the above progress note. The patient tolerated this well. Wound was then dressed with Sarah and dressed with gauze, kerlix, and ACE.   Patient instructed to keep dressing dry, clean and intact until home health comes out.   Home health orders competed and sent to [facility]. And to change dressing on Fridays and Mondays.   Patient instructed to continue to ambulate in surgical shoe and walker.   Refill completed for Norco 10-325mg to be taken as needed for pain. Patient advised on the possible elevation of blood pressure or heart effects and caution to take pills as needed and to discontinue use if symptoms arise, patient agreed.   Patient should call the clinic immediately if any signs of infection such as fever chills sweats increased redness or pain.  Patient to return in 1  week or sooner if needed.            Barb Veras DPM  [facility]

## 2020-11-10 NOTE — PROGRESS NOTES
Patient did not want to complete psychotherapy session this date. Rescheduled for tomorrow, 11/11/2020

## 2020-11-10 NOTE — PROGRESS NOTES
"CHW contacted Mr. Green to update the assessments.  Patient scored "25" on the PHQ-9 and "15" on the DOMINIK-7.  Patient states he has passive thoughts about hurting himself, but does not have a plan to hurt himself. He says he will not do that, but he thinks about it.    "

## 2020-11-11 ENCOUNTER — TELEPHONE (OUTPATIENT)
Dept: INTERNAL MEDICINE | Facility: CLINIC | Age: 65
End: 2020-11-11

## 2020-11-11 ENCOUNTER — CLINICAL SUPPORT (OUTPATIENT)
Dept: PRIMARY CARE CLINIC | Facility: CLINIC | Age: 65
End: 2020-11-11
Payer: MEDICARE

## 2020-11-11 DIAGNOSIS — F33.1 MDD (MAJOR DEPRESSIVE DISORDER), RECURRENT EPISODE, MODERATE: ICD-10-CM

## 2020-11-11 DIAGNOSIS — F41.1 GAD (GENERALIZED ANXIETY DISORDER): Primary | ICD-10-CM

## 2020-11-11 PROCEDURE — 90837 PSYTX W PT 60 MINUTES: CPT | Mod: BHI,95,, | Performed by: SOCIAL WORKER

## 2020-11-11 PROCEDURE — 90837 PR PSYCHOTHERAPY W/PATIENT, 60 MIN: ICD-10-PCS | Mod: BHI,95,, | Performed by: SOCIAL WORKER

## 2020-11-11 NOTE — TELEPHONE ENCOUNTER
----- Message from Prisca Zamarripa sent at 11/11/2020  1:32 PM CST -----  Regarding: wheel chair  Type:  Needs Medical Advice    Who Called: pt  Symptoms (please be specific): n/a   How long has patient had these symptoms:  na/  Pharmacy name and phone #:  n/a  Would the patient rather a call back or a response via [facility]? Call back   Best Call Back Number: [phone]  Additional Information: Please call back.Thanks

## 2020-11-13 ENCOUNTER — PATIENT OUTREACH (OUTPATIENT)
Dept: ADMINISTRATIVE | Facility: HOSPITAL | Age: 65
End: 2020-11-13

## 2020-11-16 ENCOUNTER — OFFICE VISIT (OUTPATIENT)
Dept: PODIATRY | Facility: CLINIC | Age: 65
End: 2020-11-16
Payer: MEDICARE

## 2020-11-16 ENCOUNTER — TELEPHONE (OUTPATIENT)
Dept: INTERNAL MEDICINE | Facility: CLINIC | Age: 65
End: 2020-11-16

## 2020-11-16 VITALS
WEIGHT: 190.06 LBS | DIASTOLIC BLOOD PRESSURE: 87 MMHG | HEIGHT: 65 IN | SYSTOLIC BLOOD PRESSURE: 175 MMHG | BODY MASS INDEX: 31.67 KG/M2 | HEART RATE: 87 BPM

## 2020-11-16 DIAGNOSIS — E11.49 TYPE II DIABETES MELLITUS WITH NEUROLOGICAL MANIFESTATIONS: ICD-10-CM

## 2020-11-16 DIAGNOSIS — T87.81 DEHISCENCE OF AMPUTATION STUMP: ICD-10-CM

## 2020-11-16 DIAGNOSIS — L97.522 ULCER OF LEFT FOOT, WITH FAT LAYER EXPOSED: Primary | ICD-10-CM

## 2020-11-16 DIAGNOSIS — G89.18 ACUTE POST-OPERATIVE PAIN: ICD-10-CM

## 2020-11-16 PROCEDURE — 99999 PR PBB SHADOW E&M-EST. PATIENT-LVL V: CPT | Mod: PBBFAC,HCNC,, | Performed by: PODIATRIST

## 2020-11-16 PROCEDURE — 99499 NO LOS: ICD-10-PCS | Mod: HCNC,S$GLB,, | Performed by: PODIATRIST

## 2020-11-16 PROCEDURE — 11042 PR DEBRIDEMENT, SKIN, SUB-Q TISSUE,=<20 SQ CM: ICD-10-PCS | Mod: HCNC,S$GLB,, | Performed by: PODIATRIST

## 2020-11-16 PROCEDURE — 99999 PR PBB SHADOW E&M-EST. PATIENT-LVL V: ICD-10-PCS | Mod: PBBFAC,HCNC,, | Performed by: PODIATRIST

## 2020-11-16 PROCEDURE — 3008F PR BODY MASS INDEX (BMI) DOCUMENTED: ICD-10-PCS | Mod: HCNC,CPTII,S$GLB, | Performed by: PODIATRIST

## 2020-11-16 PROCEDURE — 11042 DBRDMT SUBQ TIS 1ST 20SQCM/<: CPT | Mod: HCNC,S$GLB,, | Performed by: PODIATRIST

## 2020-11-16 PROCEDURE — 3008F BODY MASS INDEX DOCD: CPT | Mod: HCNC,CPTII,S$GLB, | Performed by: PODIATRIST

## 2020-11-16 PROCEDURE — 99499 UNLISTED E&M SERVICE: CPT | Mod: HCNC,S$GLB,, | Performed by: PODIATRIST

## 2020-11-16 RX ORDER — HYDROCODONE BITARTRATE AND ACETAMINOPHEN 10; 325 MG/1; MG/1
1 TABLET ORAL EVERY 8 HOURS PRN
Qty: 20 TABLET | Refills: 0 | Status: SHIPPED | OUTPATIENT
Start: 2020-11-19 | End: 2020-11-23 | Stop reason: SDUPTHER

## 2020-11-16 NOTE — PROGRESS NOTES
Subjective:     Patient ID: Crow Green is a 65 y.o. male.    Chief Complaint: Wound Check (1 wk wound check left foot )    Crow is a 65 y.o. male who presents to the clinic for evaluation and treatment of high risk feet. Crow has a past medical history of Arthritis, Asthma, Atrial fibrillation, Atrial fibrillation with RVR (8/7/2019), CHF (congestive heart failure), Chronic obstructive pulmonary disease (8/5/2020), Depression, Dermatomyositis, Diabetes mellitus, type 2 (Diagnosed in 2000), Elevated PSA, Follow up (7/30/2020), Hypertension, and Sleep apnea. The patient's chief complaint left foot ulcer. Patient states he kept dressing dry, clean, and intact since last appointment. Patient states his pain has eased up in the day time and only takes the pain medication at night time. This patient has documented high risk feet requiring routine maintenance secondary to diabetes mellitis and those secondary complications of diabetes, as mentioned..    PCP: Mateo Lambert, DO    Date Last Seen by PCP: 10/01/2200    Current shoe gear:  Affected Foot: Surgical boot     Unaffected Foot: Tennis shoes    History of Trauma: negative  Sign of Infection: none    Hemoglobin A1C   Date Value Ref Range Status   10/01/2020 6.6 (H) 4.0 - 5.6 % Final     Comment:     ADA Screening Guidelines:  5.7-6.4%  Consistent with prediabetes  >or=6.5%  Consistent with diabetes  High levels of fetal hemoglobin interfere with the HbA1C  assay. Heterozygous hemoglobin variants (HbS, HgC, etc)do  not significantly interfere with this assay.   However, presence of multiple variants may affect accuracy.     05/19/2020 7.7 (H) 4.0 - 5.6 % Final     Comment:     ADA Screening Guidelines:  5.7-6.4%  Consistent with prediabetes  >or=6.5%  Consistent with diabetes  High levels of fetal hemoglobin interfere with the HbA1C  assay. Heterozygous hemoglobin variants (HbS, HgC, etc)do  not significantly interfere with this assay.   However,  presence of multiple variants may affect accuracy.     12/13/2019 7.5 (H) 4.0 - 5.6 % Final     Comment:     ADA Screening Guidelines:  5.7-6.4%  Consistent with prediabetes  >or=6.5%  Consistent with diabetes  High levels of fetal hemoglobin interfere with the HbA1C  assay. Heterozygous hemoglobin variants (HbS, HgC, etc)do  not significantly interfere with this assay.   However, presence of multiple variants may affect accuracy.         Patient Active Problem List   Diagnosis    ED (erectile dysfunction)    Non-proliferative diabetic retinopathy, mild, both eyes    Hypertension associated with diabetes    Diabetic polyneuropathy    Nonobstructive coronary artery disease of native artery of native heart    Chronic combined systolic and diastolic HFrEF of 45%    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, with long-term current use of insulin    SANDRITA on CPAP    Moderate asthma    Psychophysiological insomnia    Nightmares    Sleep related gastroesophageal reflux disease    Inadequate sleep hygiene    PAF (paroxysmal atrial fibrillation)    At risk for medication noncompliance    Obesity (BMI 30.0-34.9)    Indeterminate stage chronic open angle glaucoma    Right hip pain    Gait instability    Chronic right shoulder pain    Arthritis    Left sided sciatica    Dyspnea on exertion    Osteoarthritis of spine with radiculopathy, lumbar region    HNP (herniated nucleus pulposus), lumbar    Gastroesophageal reflux disease without esophagitis    Chronic diastolic heart failure    Hypogonadism in male    Disorder of parathyroid gland    Hyperlipidemia associated with type 2 diabetes mellitus    JOSE MARIA (acute kidney injury)    LRTI (lower respiratory tract infection)    Physical deconditioning    Acute pain of left shoulder    Traumatic tear of left rotator cuff    Atrial fibrillation with RVR    Other chronic pain    Left shoulder pain     Complete tear of left rotator cuff    Stage 3 chronic kidney disease    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 associated with type 2 diabetes mellitus    Lumbar radiculopathy    Diabetic ulcer of toe of left foot associated with type 2 diabetes mellitus, with necrosis of muscle    Ulcer of left foot    Elevated troponin    Hypotension due to medication    Dermatomyositis    Postprocedural hypotension    Follow up    Weakness generalized    Advanced directives, counseling/discussion    Cocaine dependence, in remission    Schizoaffective disorder, depressive type    Chronic obstructive pulmonary disease    Severe episode of recurrent major depressive disorder, without psychotic features    DOMINIK (generalized anxiety disorder)       Medication List with Changes/Refills   Current Medications    ALCOHOL SWABS (ALCOHOL PREP PADS) PADM    Apply 1 each topically once daily.    ALLOPURINOL (ZYLOPRIM) 100 MG TABLET    Take 1 tablet (100 mg total) by mouth once daily.    ASPIRIN (ECOTRIN) 81 MG EC TABLET    Take 1 tablet (81 mg total) by mouth once daily.    ATORVASTATIN (LIPITOR) 40 MG TABLET    TAKE 1 TABLET EVERY EVENING    AZATHIOPRINE (IMURAN) 50 MG TAB    Take 1 tablet (50 mg total) by mouth once daily.    BACLOFEN (LIORESAL) 10 MG TABLET    Take 1 tablet (10 mg total) by mouth once daily.    BLOOD SUGAR DIAGNOSTIC (TRUE METRIX GLUCOSE TEST STRIP) STRP    Use with blood glucose meter kit to test blood sugar four times  daily    BLOOD-GLUCOSE METER (TRUE METRIX AIR GLUCOSE METER) KIT    TEST FOUR TIMES DAILY BEFORE MEALS  AND EVERY NIGHT    COLCRYS 0.6 MG TABLET    TAKE 1 TABLET EVERY DAY    DICLOFENAC SODIUM (VOLTAREN) 1 % GEL    Apply 2 g topically 4 (four) times daily.    DOXYCYCLINE (VIBRAMYCIN) 100 MG CAP    Take 1 capsule (100 mg total) by mouth 2 (two) times daily.    ELIQUIS 5 MG TAB    TAKE 1 TABLET TWICE DAILY    FLUOCINONIDE (LIDEX) 0.05 % EXTERNAL  SOLUTION    Apply topically 2 (two) times daily. Use on scalp.    FLUTICASONE-SALMETEROL DISKUS INHALER 250-50 MCG    Inhale 1 puff into the lungs 2 (two) times daily. Controller    FOOD SUPPLEMT, LACTOSE-REDUCED (ENSURE) LIQD    Take 118 mLs by mouth 3 (three) times daily with meals.    FUROSEMIDE (LASIX) 40 MG TABLET    TAKE 2 TABLETS EVERY MORNING  AND TAKE 1 TABLET EVERY EVENING    GABAPENTIN (NEURONTIN) 800 MG TABLET    Take 1 tablet (800 mg total) by mouth 3 (three) times daily.    GLIMEPIRIDE (AMARYL) 2 MG TABLET    TAKE 1 TABLET (2 MG TOTAL) BY MOUTH BEFORE BREAKFAST.    INHALATION SPACING DEVICE    Use as directed for inhalation.    INSULIN (LANTUS SOLOSTAR U-100 INSULIN) GLARGINE 100 UNITS/ML (3ML) SUBQ PEN    INJECT 35 UNITS SUBCUTANEOUSLY EVERY EVENING    INSULIN ASPART U-100 (NOVOLOG FLEXPEN U-100 INSULIN) 100 UNIT/ML (3 ML) INPN PEN    INJECT 10 UNITS SUBCUTANEOUSLY THREE TIMES DAILY WITH MEALS    INSULIN SYRINGE-NEEDLE U-100 1 ML 31 GAUGE X 5/16 SYRG        IPRATROPIUM (ATROVENT) 0.02 % NEBULIZER SOLUTION    USE 1 VIAL VIA NEBULIZER FOUR TIMES DAILY    ISOSORBIDE MONONITRATE (IMDUR) 60 MG 24 HR TABLET    Take 1 tablet (60 mg total) by mouth once daily.    LANCETS 32 GAUGE MISC    1 lancet by Misc.(Non-Drug; Combo Route) route 2 (two) times daily.    LATANOPROST 0.005 % OPHTHALMIC SOLUTION    INSTILL 1 DROP INTO BOTH EYES EVERY EVENING    LISINOPRIL 10 MG TABLET        MAGNESIUM OXIDE (MAG-OX) 400 MG (241.3 MG MAGNESIUM) TABLET    Take 1 tablet (400 mg total) by mouth once daily.    METFORMIN (GLUCOPHAGE-XR) 500 MG ER 24HR TABLET    Take 2 tablets (1,000 mg total) by mouth 2 (two) times daily with meals.    METOPROLOL SUCCINATE (TOPROL-XL) 50 MG 24 HR TABLET    Take 1 tablet (50 mg total) by mouth once daily.    NITROGLYCERIN (NITROSTAT) 0.3 MG SL TABLET    PLACE 1 TABLET UNDER THE TONGUE EVERY 5 MINUTES AS NEEDED FOR CHEST PAIN, NO MORE THAN 3 TABLETS IN ONE DAY    OZEMPIC 1 MG/DOSE (2 MG/1.5 ML) PNIJ  "   Inject 0.75 mLs into the skin once a week.    PANTOPRAZOLE (PROTONIX) 40 MG TABLET    Take 1 tablet (40 mg total) by mouth once daily.    PEN NEEDLE, DIABETIC (BD ULTRA-FINE SHORT PEN NEEDLE) 31 GAUGE X 5/16" NDLE    Inject 1 each into the skin 3 (three) times daily.    PREDNISONE (DELTASONE) 5 MG TABLET    Take 1 tablet (5 mg total) by mouth 2 (two) times daily.    SERTRALINE (ZOLOFT) 100 MG TABLET    Take 1 tablet (100 mg total) by mouth every evening.    SILVER SULFADIAZINE 1% (SILVADENE) 1 % CREAM    Apply topically 2 (two) times daily.    TAMSULOSIN (FLOMAX) 0.4 MG CAP    Take 1 capsule (0.4 mg total) by mouth once daily.    TIOTROPIUM (SPIRIVA WITH HANDIHALER) 18 MCG INHALATION CAPSULE    Inhale 1 capsule (18 mcg total) into the lungs once daily. Controller    TRAZODONE (DESYREL) 100 MG TABLET    Take 2 tablets (200 mg total) by mouth every evening.    TRIAMCINOLONE ACETONIDE 0.1% (KENALOG) 0.1 % OINTMENT    Apply topically 2 (two) times daily. Use on legs and back    VIOS AEROSOL DELIVERY SYSTEM VIN    USE AS DIRESTED   Changed and/or Refilled Medications    Modified Medication Previous Medication    HYDROCODONE-ACETAMINOPHEN (NORCO)  MG PER TABLET HYDROcodone-acetaminophen (NORCO)  mg per tablet       Take 1 tablet by mouth every 8 (eight) hours as needed for Pain.    Take 1 tablet by mouth every 8 (eight) hours as needed for Pain.       Review of patient's allergies indicates:   Allergen Reactions    Protamine Hives     Urticaria, possible upper airway swelling       Past Surgical History:   Procedure Laterality Date    ABLATION OF ARRHYTHMOGENIC FOCUS FOR ATRIAL FIBRILLATION N/A 2/27/2020    Procedure: Ablation atrial fibrillation;  Surgeon: Gio Brown MD;  Location: [facility];  Service: Cardiology;  Laterality: N/A;  afib, DAMIEN (cx if SR), PVI, PITO, anes, MB, 3 Prep    CHOLECYSTECTOMY      CLOSURE OF WOUND Left 7/1/2020    Procedure: CLOSURE, WOUND;  Surgeon: Barb Veras, " MONTY;  Location: [facility] OR;  Service: Podiatry;  Laterality: Left;    SELECTIVE INJECTION OF ANESTHETIC AGENT AROUND LUMBAR SPINAL NERVE ROOT BY TRANSFORAMINAL APPROACH Left 6/11/2020    Procedure: BLOCK, SPINAL NERVE ROOT, LUMBAR, SELECTIVE, TRANSFORAMINAL APPROACH Left L4-5, L5-S1 TFESI with RN IV sedation;  Surgeon: Dillan Tsai MD;  Location: [facility] PAIN T;  Service: Pain Management;  Laterality: Left;    TOE AMPUTATION Left 6/29/2020    Procedure: AMPUTATION, TOE;  Surgeon: Barb Veras DPM;  Location: [facility] OR;  Service: Podiatry;  Laterality: Left;  great toe       Family History   Problem Relation Age of Onset    Glaucoma Mother     Cataracts Mother     Cataracts Father     Glaucoma Sister     Cataracts Sister     Glaucoma Maternal Aunt     No Known Problems Brother     No Known Problems Daughter     No Known Problems Son     Prostate cancer Neg Hx        Social History     Socioeconomic History    Marital status: Legally      Spouse name: Not on file    Number of children: 5    Years of education: Not on file    Highest education level: Not on file   Occupational History    Occupation: disabled   Social Needs    Financial resource strain: Very hard    Food insecurity     Worry: Often true     Inability: Often true    Transportation needs     Medical: Yes     Non-medical: Yes   Tobacco Use    Smoking status: Never Smoker    Smokeless tobacco: Never Used   Substance and Sexual Activity    Alcohol use: Not Currently    Drug use: No    Sexual activity: Yes     Partners: Female   Lifestyle    Physical activity     Days per week: 0 days     Minutes per session: 0 min    Stress: Rather much   Relationships    Social connections     Talks on phone: Three times a week     Gets together: Never     Attends Muslim service: More than 4 times per year     Active member of club or organization: No     Attends meetings of clubs or organizations: Never     Relationship status:  "   Other Topics Concern    Not on file   Social History Narrative    Utilizing [employer] transportation which has been unreliable.        Vitals:    11/16/20 1337   BP: (!) 175/87   Pulse: 87   Weight: 86.2 kg (190 lb 0.6 oz)   Height: 5' 5" (1.651 m)       Hemoglobin A1C   Date Value Ref Range Status   10/01/2020 6.6 (H) 4.0 - 5.6 % Final     Comment:     ADA Screening Guidelines:  5.7-6.4%  Consistent with prediabetes  >or=6.5%  Consistent with diabetes  High levels of fetal hemoglobin interfere with the HbA1C  assay. Heterozygous hemoglobin variants (HbS, HgC, etc)do  not significantly interfere with this assay.   However, presence of multiple variants may affect accuracy.     05/19/2020 7.7 (H) 4.0 - 5.6 % Final     Comment:     ADA Screening Guidelines:  5.7-6.4%  Consistent with prediabetes  >or=6.5%  Consistent with diabetes  High levels of fetal hemoglobin interfere with the HbA1C  assay. Heterozygous hemoglobin variants (HbS, HgC, etc)do  not significantly interfere with this assay.   However, presence of multiple variants may affect accuracy.     12/13/2019 7.5 (H) 4.0 - 5.6 % Final     Comment:     ADA Screening Guidelines:  5.7-6.4%  Consistent with prediabetes  >or=6.5%  Consistent with diabetes  High levels of fetal hemoglobin interfere with the HbA1C  assay. Heterozygous hemoglobin variants (HbS, HgC, etc)do  not significantly interfere with this assay.   However, presence of multiple variants may affect accuracy.         Review of Systems   Constitutional: Negative for chills and fever.   Respiratory: Negative for shortness of breath.    Cardiovascular: Negative for chest pain, palpitations, orthopnea, claudication and leg swelling.   Gastrointestinal: Negative for diarrhea, nausea and vomiting.   Musculoskeletal: Negative for joint pain.   Skin: Negative for rash.   Neurological: Positive for tingling and sensory change.   Psychiatric/Behavioral: Negative.            Objective:      PHYSICAL " EXAM: Apperance: Alert and orient in no distress,well developed, and with good attention to grooming and body habits  Patient presents ambulating in surgical shoe and dressing to left foot and tennis shoe on the right.   Lower Extremity Exam  VASCULAR: Dorsalis pedis pulses 2/4 left and Posterior Tibial pulses 2/4 left. Capillary fill time <4 seconds left. No edema observed left. Varicosities present left. Skin temperature of the lower extremities is warm to warm, proximal to distal. Hair growth dim left. (--) lymphangitis or (--) cellulitis noted left.  DERMATOLOGICAL: (--) edema, (--) erythema, (--) malodor, (+) decreased drainage, (+) decreased warmth to left foot. Ulcer measurement (post) 0.1cm x 0.2cm x 0.1cm.  The ulcer does not extend into deeper tissue and (--) sinus tracts exist.  Previous ulcer noted to left medial 1st metatarsal head closed with thin epithealized tissue. The dorsum surface of the feet are soft and supple.  The plantar aspects of feet are dry and scaly. Webspaces 2,3,4 clean, dry and without evidence of break in skin integrity left.   NEUROLOGICAL: Light touch, sharp-dull, proprioception all present and equal bilaterally. Protective sensation absent sites as tested with a [place]-Kimmie 5.07 monofilament.   MUSCULOSKELETAL: Muscle strength is 5/5 for foot inverters, everters, plantarflexors, and dorsiflexors. Muscle tone is normal. Left hallux amputation noted.           Assessment:       Encounter Diagnoses   Name Primary?    Ulcer of left foot, with fat layer exposed - Left Foot Yes    Dehiscence of amputation stump - Left Foot     Acute post-operative pain - Left Foot     Type II diabetes mellitus with neurological manifestations          Plan:   Ulcer of left foot, with fat layer exposed - Left Foot  -     HYDROcodone-acetaminophen (NORCO)  mg per tablet; Take 1 tablet by mouth every 8 (eight) hours as needed for Pain.  Dispense: 20 tablet; Refill: 0    Dehiscence of  amputation stump - Left Foot  -     HYDROcodone-acetaminophen (NORCO)  mg per tablet; Take 1 tablet by mouth every 8 (eight) hours as needed for Pain.  Dispense: 20 tablet; Refill: 0    Acute post-operative pain - Left Foot  -     HYDROcodone-acetaminophen (NORCO)  mg per tablet; Take 1 tablet by mouth every 8 (eight) hours as needed for Pain.  Dispense: 20 tablet; Refill: 0    Type II diabetes mellitus with neurological manifestations      I counseled the patient on his conditions, regarding findings of my examination, my impressions, and usual treatment plan.   Left medial 1st metatarsal ulcer sharply excisionally debrided of viable and nonviable tissue from the wound periphery and base. I redefined the wound borders in the process. Debridement was carried into and did include the subcutaneous layer down to good bleeding granular tissue base utilizing sterile #15 blade and tissue nipper and forceps. Wound was irrigated with sterile saline and bleeding was controlled with direct pressure. Minimal blood loss.  Post debridement measurements are contained in the above progress note. The patient tolerated this well. Wound was then dressed with Sarah and dressed with gauze, kerlix, and ACE.   Patient instructed to keep dressing dry, clean and intact until home health comes out.   Home health orders competed and sent to [facility]. And to change dressing on Fridays and Mondays.   Patient instructed to continue to ambulate in surgical shoe and walker.   Refill completed for Norco 10-325mg to be taken as needed for pain. Patient advised on the possible elevation of blood pressure or heart effects and caution to take pills as needed and to discontinue use if symptoms arise, patient agreed.   Patient should call the clinic immediately if any signs of infection such as fever chills sweats increased redness or pain.  Patient to return in 1 week or sooner if needed.            Sharnette Miles, DPM [facility]

## 2020-11-16 NOTE — TELEPHONE ENCOUNTER
----- Message from Bonnie Farrar sent at 11/16/2020  1:23 PM CST -----  Regarding: [employer]  Would like to consult with nurse regarding fax that was sent over on end of September, they haven't received it yet. She is about to resend it, please send back as soon as possible. If any questions give give a call back at [phone].

## 2020-11-18 ENCOUNTER — TELEPHONE (OUTPATIENT)
Dept: INTERNAL MEDICINE | Facility: CLINIC | Age: 65
End: 2020-11-18

## 2020-11-18 NOTE — TELEPHONE ENCOUNTER
----- Message from Pete Rendon sent at 11/18/2020  9:52 AM CST -----  Regarding: pt  .Type:  Needs Medical Advice    Who Called: pt   Symptoms (please be specific): Body pain/ finger Numbness    How long has patient had these symptoms: 2months   Pharmacy name and phone #:  .      [id number] - [place] [facility] - [address] AT [facility]  [address] [place] [address]  Phone: [phone] Fax: [phone]      Would the patient rather a call back or a response via [facility]? Call back   Best Call Back Number: [phone]  Additional Information:     Thank you ,   erythromycin

## 2020-11-20 ENCOUNTER — TELEPHONE (OUTPATIENT)
Dept: PRIMARY CARE CLINIC | Facility: CLINIC | Age: 65
End: 2020-11-20

## 2020-11-20 NOTE — PROGRESS NOTES
W contacted Mr. Green to remind patient of his appointment 11- with Awilda Nogueira LCSW.  Patient confirmed the appointment.

## 2020-11-22 ENCOUNTER — PATIENT OUTREACH (OUTPATIENT)
Dept: ADMINISTRATIVE | Facility: OTHER | Age: 65
End: 2020-11-22

## 2020-11-23 ENCOUNTER — CLINICAL SUPPORT (OUTPATIENT)
Dept: PRIMARY CARE CLINIC | Facility: CLINIC | Age: 65
End: 2020-11-23
Payer: MEDICARE

## 2020-11-23 ENCOUNTER — OFFICE VISIT (OUTPATIENT)
Dept: PODIATRY | Facility: CLINIC | Age: 65
End: 2020-11-23
Payer: MEDICARE

## 2020-11-23 ENCOUNTER — TELEPHONE (OUTPATIENT)
Dept: INTERNAL MEDICINE | Facility: CLINIC | Age: 65
End: 2020-11-23

## 2020-11-23 VITALS
SYSTOLIC BLOOD PRESSURE: 153 MMHG | WEIGHT: 190.06 LBS | HEART RATE: 90 BPM | BODY MASS INDEX: 31.67 KG/M2 | DIASTOLIC BLOOD PRESSURE: 93 MMHG | HEIGHT: 65 IN

## 2020-11-23 DIAGNOSIS — G89.18 ACUTE POST-OPERATIVE PAIN: ICD-10-CM

## 2020-11-23 DIAGNOSIS — T87.81 DEHISCENCE OF AMPUTATION STUMP: ICD-10-CM

## 2020-11-23 DIAGNOSIS — F33.2 SEVERE EPISODE OF RECURRENT MAJOR DEPRESSIVE DISORDER, WITHOUT PSYCHOTIC FEATURES: Primary | ICD-10-CM

## 2020-11-23 DIAGNOSIS — Z89.412 STATUS POST AMPUTATION OF LEFT GREAT TOE: ICD-10-CM

## 2020-11-23 DIAGNOSIS — L97.522 ULCER OF LEFT FOOT, WITH FAT LAYER EXPOSED: Primary | ICD-10-CM

## 2020-11-23 DIAGNOSIS — G89.29 OTHER CHRONIC PAIN: ICD-10-CM

## 2020-11-23 DIAGNOSIS — E11.49 TYPE II DIABETES MELLITUS WITH NEUROLOGICAL MANIFESTATIONS: ICD-10-CM

## 2020-11-23 DIAGNOSIS — F41.1 GAD (GENERALIZED ANXIETY DISORDER): ICD-10-CM

## 2020-11-23 PROCEDURE — 99999 PR PBB SHADOW E&M-EST. PATIENT-LVL V: ICD-10-PCS | Mod: PBBFAC,HCNC,, | Performed by: PODIATRIST

## 2020-11-23 PROCEDURE — 3008F BODY MASS INDEX DOCD: CPT | Mod: HCNC,CPTII,S$GLB, | Performed by: PODIATRIST

## 2020-11-23 PROCEDURE — 11042 PR DEBRIDEMENT, SKIN, SUB-Q TISSUE,=<20 SQ CM: ICD-10-PCS | Mod: HCNC,S$GLB,, | Performed by: PODIATRIST

## 2020-11-23 PROCEDURE — 99499 UNLISTED E&M SERVICE: CPT | Mod: HCNC,S$GLB,, | Performed by: PODIATRIST

## 2020-11-23 PROCEDURE — 11042 DBRDMT SUBQ TIS 1ST 20SQCM/<: CPT | Mod: HCNC,S$GLB,, | Performed by: PODIATRIST

## 2020-11-23 PROCEDURE — 99999 PR PBB SHADOW E&M-EST. PATIENT-LVL V: CPT | Mod: PBBFAC,HCNC,, | Performed by: PODIATRIST

## 2020-11-23 PROCEDURE — 3008F PR BODY MASS INDEX (BMI) DOCUMENTED: ICD-10-PCS | Mod: HCNC,CPTII,S$GLB, | Performed by: PODIATRIST

## 2020-11-23 PROCEDURE — 90837 PSYTX W PT 60 MINUTES: CPT | Mod: BHI,95,, | Performed by: SOCIAL WORKER

## 2020-11-23 PROCEDURE — 90837 PR PSYCHOTHERAPY W/PATIENT, 60 MIN: ICD-10-PCS | Mod: BHI,95,, | Performed by: SOCIAL WORKER

## 2020-11-23 PROCEDURE — 99499 NO LOS: ICD-10-PCS | Mod: HCNC,S$GLB,, | Performed by: PODIATRIST

## 2020-11-23 RX ORDER — HYDROCODONE BITARTRATE AND ACETAMINOPHEN 10; 325 MG/1; MG/1
1 TABLET ORAL EVERY 8 HOURS PRN
Qty: 20 TABLET | Refills: 0 | Status: SHIPPED | OUTPATIENT
Start: 2020-11-25 | End: 2020-12-02 | Stop reason: SDUPTHER

## 2020-11-23 NOTE — TELEPHONE ENCOUNTER
----- Message from Kaila Vaughan sent at 11/23/2020 10:03 AM CST -----  Regarding: [employer]Linda  States the [facility] needs a signature on the second page. States she is going to refax it. Please call [phone]. Ref Order# [id number]. Thank you

## 2020-11-23 NOTE — PROGRESS NOTES
Subjective:     Patient ID: Crow Green is a 65 y.o. male.    Chief Complaint: Wound Check (1 wk Wound Check )    Crow is a 65 y.o. male who presents to the clinic for evaluation and treatment of high risk feet. Crow has a past medical history of Arthritis, Asthma, Atrial fibrillation, Atrial fibrillation with RVR (8/7/2019), CHF (congestive heart failure), Chronic obstructive pulmonary disease (8/5/2020), Depression, Dermatomyositis, Diabetes mellitus, type 2 (Diagnosed in 2000), Elevated PSA, Follow up (7/30/2020), Hypertension, and Sleep apnea. The patient's chief complaint left foot ulcer. Patient states he kept dressing dry, clean, and intact since last appointment. Patient states his pain has eased up in the day time and only takes the pain medication at night time. This patient has documented high risk feet requiring routine maintenance secondary to diabetes mellitis and those secondary complications of diabetes, as mentioned..    PCP: Mateo Lambetr, DO    Date Last Seen by PCP: 10/01/2200    Current shoe gear:  Affected Foot: Surgical boot     Unaffected Foot: Tennis shoes    History of Trauma: negative  Sign of Infection: none    Hemoglobin A1C   Date Value Ref Range Status   10/01/2020 6.6 (H) 4.0 - 5.6 % Final     Comment:     ADA Screening Guidelines:  5.7-6.4%  Consistent with prediabetes  >or=6.5%  Consistent with diabetes  High levels of fetal hemoglobin interfere with the HbA1C  assay. Heterozygous hemoglobin variants (HbS, HgC, etc)do  not significantly interfere with this assay.   However, presence of multiple variants may affect accuracy.     05/19/2020 7.7 (H) 4.0 - 5.6 % Final     Comment:     ADA Screening Guidelines:  5.7-6.4%  Consistent with prediabetes  >or=6.5%  Consistent with diabetes  High levels of fetal hemoglobin interfere with the HbA1C  assay. Heterozygous hemoglobin variants (HbS, HgC, etc)do  not significantly interfere with this assay.   However, presence of  multiple variants may affect accuracy.     12/13/2019 7.5 (H) 4.0 - 5.6 % Final     Comment:     ADA Screening Guidelines:  5.7-6.4%  Consistent with prediabetes  >or=6.5%  Consistent with diabetes  High levels of fetal hemoglobin interfere with the HbA1C  assay. Heterozygous hemoglobin variants (HbS, HgC, etc)do  not significantly interfere with this assay.   However, presence of multiple variants may affect accuracy.         Patient Active Problem List   Diagnosis    ED (erectile dysfunction)    Non-proliferative diabetic retinopathy, mild, both eyes    Hypertension associated with diabetes    Diabetic polyneuropathy    Nonobstructive coronary artery disease of native artery of native heart    Chronic combined systolic and diastolic HFrEF of 45%    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, with long-term current use of insulin    SANDRITA on CPAP    Moderate asthma    Psychophysiological insomnia    Nightmares    Sleep related gastroesophageal reflux disease    Inadequate sleep hygiene    PAF (paroxysmal atrial fibrillation)    At risk for medication noncompliance    Obesity (BMI 30.0-34.9)    Indeterminate stage chronic open angle glaucoma    Right hip pain    Gait instability    Chronic right shoulder pain    Arthritis    Left sided sciatica    Dyspnea on exertion    Osteoarthritis of spine with radiculopathy, lumbar region    HNP (herniated nucleus pulposus), lumbar    Gastroesophageal reflux disease without esophagitis    Chronic diastolic heart failure    Hypogonadism in male    Disorder of parathyroid gland    Hyperlipidemia associated with type 2 diabetes mellitus    JOSE MARIA (acute kidney injury)    LRTI (lower respiratory tract infection)    Physical deconditioning    Acute pain of left shoulder    Traumatic tear of left rotator cuff    Atrial fibrillation with RVR    Other chronic pain    Left shoulder pain    Complete  tear of left rotator cuff    Stage 3 chronic kidney disease    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 associated with type 2 diabetes mellitus    Lumbar radiculopathy    Diabetic ulcer of toe of left foot associated with type 2 diabetes mellitus, with necrosis of muscle    Ulcer of left foot    Elevated troponin    Hypotension due to medication    Dermatomyositis    Postprocedural hypotension    Follow up    Weakness generalized    Advanced directives, counseling/discussion    Cocaine dependence, in remission    Schizoaffective disorder, depressive type    Chronic obstructive pulmonary disease    Severe episode of recurrent major depressive disorder, without psychotic features    DOMINIK (generalized anxiety disorder)       Medication List with Changes/Refills   Current Medications    ALCOHOL SWABS (ALCOHOL PREP PADS) PADM    Apply 1 each topically once daily.    ALLOPURINOL (ZYLOPRIM) 100 MG TABLET    Take 1 tablet (100 mg total) by mouth once daily.    ASPIRIN (ECOTRIN) 81 MG EC TABLET    Take 1 tablet (81 mg total) by mouth once daily.    ATORVASTATIN (LIPITOR) 40 MG TABLET    TAKE 1 TABLET EVERY EVENING    AZATHIOPRINE (IMURAN) 50 MG TAB    Take 1 tablet (50 mg total) by mouth once daily.    BACLOFEN (LIORESAL) 10 MG TABLET    Take 1 tablet (10 mg total) by mouth once daily.    BLOOD SUGAR DIAGNOSTIC (TRUE METRIX GLUCOSE TEST STRIP) STRP    Use with blood glucose meter kit to test blood sugar four times  daily    BLOOD-GLUCOSE METER (TRUE METRIX AIR GLUCOSE METER) KIT    TEST FOUR TIMES DAILY BEFORE MEALS  AND EVERY NIGHT    COLCRYS 0.6 MG TABLET    TAKE 1 TABLET EVERY DAY    DICLOFENAC SODIUM (VOLTAREN) 1 % GEL    Apply 2 g topically 4 (four) times daily.    DOXYCYCLINE (VIBRAMYCIN) 100 MG CAP    Take 1 capsule (100 mg total) by mouth 2 (two) times daily.    ELIQUIS 5 MG TAB    TAKE 1 TABLET TWICE DAILY    FLUOCINONIDE (LIDEX) 0.05 % EXTERNAL SOLUTION    Apply  topically 2 (two) times daily. Use on scalp.    FLUTICASONE-SALMETEROL DISKUS INHALER 250-50 MCG    Inhale 1 puff into the lungs 2 (two) times daily. Controller    FOOD SUPPLEMT, LACTOSE-REDUCED (ENSURE) LIQD    Take 118 mLs by mouth 3 (three) times daily with meals.    FUROSEMIDE (LASIX) 40 MG TABLET    TAKE 2 TABLETS EVERY MORNING  AND TAKE 1 TABLET EVERY EVENING    GABAPENTIN (NEURONTIN) 800 MG TABLET    Take 1 tablet (800 mg total) by mouth 3 (three) times daily.    GLIMEPIRIDE (AMARYL) 2 MG TABLET    TAKE 1 TABLET (2 MG TOTAL) BY MOUTH BEFORE BREAKFAST.    INHALATION SPACING DEVICE    Use as directed for inhalation.    INSULIN (LANTUS SOLOSTAR U-100 INSULIN) GLARGINE 100 UNITS/ML (3ML) SUBQ PEN    INJECT 35 UNITS SUBCUTANEOUSLY EVERY EVENING    INSULIN ASPART U-100 (NOVOLOG FLEXPEN U-100 INSULIN) 100 UNIT/ML (3 ML) INPN PEN    INJECT 10 UNITS SUBCUTANEOUSLY THREE TIMES DAILY WITH MEALS    INSULIN SYRINGE-NEEDLE U-100 1 ML 31 GAUGE X 5/16 SYRG        IPRATROPIUM (ATROVENT) 0.02 % NEBULIZER SOLUTION    USE 1 VIAL VIA NEBULIZER FOUR TIMES DAILY    ISOSORBIDE MONONITRATE (IMDUR) 60 MG 24 HR TABLET    Take 1 tablet (60 mg total) by mouth once daily.    LANCETS 32 GAUGE MISC    1 lancet by Misc.(Non-Drug; Combo Route) route 2 (two) times daily.    LATANOPROST 0.005 % OPHTHALMIC SOLUTION    INSTILL 1 DROP INTO BOTH EYES EVERY EVENING    LISINOPRIL 10 MG TABLET        MAGNESIUM OXIDE (MAG-OX) 400 MG (241.3 MG MAGNESIUM) TABLET    Take 1 tablet (400 mg total) by mouth once daily.    METFORMIN (GLUCOPHAGE-XR) 500 MG ER 24HR TABLET    Take 2 tablets (1,000 mg total) by mouth 2 (two) times daily with meals.    METOPROLOL SUCCINATE (TOPROL-XL) 50 MG 24 HR TABLET    Take 1 tablet (50 mg total) by mouth once daily.    NITROGLYCERIN (NITROSTAT) 0.3 MG SL TABLET    PLACE 1 TABLET UNDER THE TONGUE EVERY 5 MINUTES AS NEEDED FOR CHEST PAIN, NO MORE THAN 3 TABLETS IN ONE DAY    OZEMPIC 1 MG/DOSE (2 MG/1.5 ML) PNIJ    Inject 0.75  "mLs into the skin once a week.    PANTOPRAZOLE (PROTONIX) 40 MG TABLET    Take 1 tablet (40 mg total) by mouth once daily.    PEN NEEDLE, DIABETIC (BD ULTRA-FINE SHORT PEN NEEDLE) 31 GAUGE X 5/16" NDLE    Inject 1 each into the skin 3 (three) times daily.    PREDNISONE (DELTASONE) 5 MG TABLET    Take 1 tablet (5 mg total) by mouth 2 (two) times daily.    SERTRALINE (ZOLOFT) 100 MG TABLET    Take 1 tablet (100 mg total) by mouth every evening.    SILVER SULFADIAZINE 1% (SILVADENE) 1 % CREAM    Apply topically 2 (two) times daily.    TAMSULOSIN (FLOMAX) 0.4 MG CAP    Take 1 capsule (0.4 mg total) by mouth once daily.    TIOTROPIUM (SPIRIVA WITH HANDIHALER) 18 MCG INHALATION CAPSULE    Inhale 1 capsule (18 mcg total) into the lungs once daily. Controller    TRAZODONE (DESYREL) 100 MG TABLET    Take 2 tablets (200 mg total) by mouth every evening.    TRIAMCINOLONE ACETONIDE 0.1% (KENALOG) 0.1 % OINTMENT    Apply topically 2 (two) times daily. Use on legs and back    VIOS AEROSOL DELIVERY SYSTEM VNI    USE AS DIRESTED   Changed and/or Refilled Medications    Modified Medication Previous Medication    HYDROCODONE-ACETAMINOPHEN (NORCO)  MG PER TABLET HYDROcodone-acetaminophen (NORCO)  mg per tablet       Take 1 tablet by mouth every 8 (eight) hours as needed for Pain.    Take 1 tablet by mouth every 8 (eight) hours as needed for Pain.       Review of patient's allergies indicates:   Allergen Reactions    Protamine Hives     Urticaria, possible upper airway swelling       Past Surgical History:   Procedure Laterality Date    ABLATION OF ARRHYTHMOGENIC FOCUS FOR ATRIAL FIBRILLATION N/A 2/27/2020    Procedure: Ablation atrial fibrillation;  Surgeon: Gio Brown MD;  [facility];  Service: Cardiology;  Laterality: N/A;  afib, DAMIEN (cx if SR), PVI, PITO, anes, MB, 3 Prep    CHOLECYSTECTOMY      CLOSURE OF WOUND Left 7/1/2020    Procedure: CLOSURE, WOUND;  Surgeon: Barb Veras DPM;  " Location: [facility] OR;  Service: Podiatry;  Laterality: Left;    SELECTIVE INJECTION OF ANESTHETIC AGENT AROUND LUMBAR SPINAL NERVE ROOT BY TRANSFORAMINAL APPROACH Left 6/11/2020    Procedure: BLOCK, SPINAL NERVE ROOT, LUMBAR, SELECTIVE, TRANSFORAMINAL APPROACH Left L4-5, L5-S1 TFESI with RN IV sedation;  Surgeon: Dillan Tsai MD;  [facility];  Service: Pain Management;  Laterality: Left;    TOE AMPUTATION Left 6/29/2020    Procedure: AMPUTATION, TOE;  Surgeon: Barb Veras DPM;  Location: [facility] OR;  Service: Podiatry;  Laterality: Left;  great toe       Family History   Problem Relation Age of Onset    Glaucoma Mother     Cataracts Mother     Cataracts Father     Glaucoma Sister     Cataracts Sister     Glaucoma Maternal Aunt     No Known Problems Brother     No Known Problems Daughter     No Known Problems Son     Prostate cancer Neg Hx        Social History     Socioeconomic History    Marital status: Legally      Spouse name: Not on file    Number of children: 5    Years of education: Not on file    Highest education level: Not on file   Occupational History    Occupation: disabled   Social Needs    Financial resource strain: Very hard    Food insecurity     Worry: Often true     Inability: Often true    Transportation needs     Medical: Yes     Non-medical: Yes   Tobacco Use    Smoking status: Never Smoker    Smokeless tobacco: Never Used   Substance and Sexual Activity    Alcohol use: Not Currently    Drug use: No    Sexual activity: Yes     Partners: Female   Lifestyle    Physical activity     Days per week: 0 days     Minutes per session: 0 min    Stress: Rather much   Relationships    Social connections     Talks on phone: Three times a week     Gets together: Never     Attends [employer] service: More than 4 times per year     Active member of club or organization: No     Attends meetings of clubs or organizations: Never     Relationship status:   "  Other Topics Concern    Not on file   Social History Narrative    Utilizing [employer] transportation which has been unreliable.        Vitals:    11/23/20 0940   BP: (!) 153/93   Pulse: 90   Weight: 86.2 kg (190 lb 0.6 oz)   Height: 5' 5" (1.651 m)       Hemoglobin A1C   Date Value Ref Range Status   10/01/2020 6.6 (H) 4.0 - 5.6 % Final     Comment:     ADA Screening Guidelines:  5.7-6.4%  Consistent with prediabetes  >or=6.5%  Consistent with diabetes  High levels of fetal hemoglobin interfere with the HbA1C  assay. Heterozygous hemoglobin variants (HbS, HgC, etc)do  not significantly interfere with this assay.   However, presence of multiple variants may affect accuracy.     05/19/2020 7.7 (H) 4.0 - 5.6 % Final     Comment:     ADA Screening Guidelines:  5.7-6.4%  Consistent with prediabetes  >or=6.5%  Consistent with diabetes  High levels of fetal hemoglobin interfere with the HbA1C  assay. Heterozygous hemoglobin variants (HbS, HgC, etc)do  not significantly interfere with this assay.   However, presence of multiple variants may affect accuracy.     12/13/2019 7.5 (H) 4.0 - 5.6 % Final     Comment:     ADA Screening Guidelines:  5.7-6.4%  Consistent with prediabetes  >or=6.5%  Consistent with diabetes  High levels of fetal hemoglobin interfere with the HbA1C  assay. Heterozygous hemoglobin variants (HbS, HgC, etc)do  not significantly interfere with this assay.   However, presence of multiple variants may affect accuracy.         Review of Systems   Constitutional: Negative for chills and fever.   Respiratory: Negative for shortness of breath.    Cardiovascular: Negative for chest pain, palpitations, orthopnea, claudication and leg swelling.   Gastrointestinal: Negative for diarrhea, nausea and vomiting.   Musculoskeletal: Negative for joint pain.   Skin: Negative for rash.   Neurological: Positive for tingling and sensory change.   Psychiatric/Behavioral: Negative.            Objective:      PHYSICAL EXAM: " Apperance: Alert and orient in no distress,well developed, and with good attention to grooming and body habits  Patient presents ambulating in surgical shoe and dressing to left foot and tennis shoe on the right.   Lower Extremity Exam  VASCULAR: Dorsalis pedis pulses 2/4 left and Posterior Tibial pulses 2/4 left. Capillary fill time <4 seconds left. No edema observed left. Varicosities present left. Skin temperature of the lower extremities is warm to warm, proximal to distal. Hair growth dim left. (--) lymphangitis or (--) cellulitis noted left.  DERMATOLOGICAL: (--) edema, (--) erythema, (--) malodor, (+) decreased drainage, (+) decreased warmth to left foot. Ulcer measurement (post) 0.1cm x 0.1cm x 0.1cm.  The ulcer does not extend into deeper tissue and (--) sinus tracts exist.  Previous ulcer noted to left medial 1st metatarsal head closed with thin epithealized tissue. The dorsum surface of the feet are soft and supple.  The plantar aspects of feet are dry and scaly. Webspaces 2,3,4 clean, dry and without evidence of break in skin integrity left.   NEUROLOGICAL: Light touch, sharp-dull, proprioception all present and equal bilaterally. Protective sensation absent sites as tested with a Mears-Kimmie 5.07 monofilament.   MUSCULOSKELETAL: Muscle strength is 5/5 for foot inverters, everters, plantarflexors, and dorsiflexors. Muscle tone is normal. Left hallux amputation noted.           Assessment:       Encounter Diagnoses   Name Primary?   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   Dispense: 20 tablet; Refill: 0    Dehiscence of amputation stump - Left Foot  -     HYDROcodone-acetaminophen (NORCO)  mg per tablet; Take 1 tablet by mouth every 8 (eight) hours as needed for Pain.  Dispense: 20 tablet; Refill: 0    Acute post-operative pain - Left Foot  -     HYDROcodone-acetaminophen (NORCO)  mg per tablet; Take 1 tablet by mouth every 8 (eight) hours as needed for Pain.  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   Left medial 1st metatarsal ulcer sharply excisionally debrided of viable and nonviable tissue from the wound periphery and base. I redefined the wound borders in the process. Debridement was carried into and did include the subcutaneous layer down to good bleeding granular tissue base utilizing sterile #15 blade and tissue nipper and forceps. Wound was irrigated with sterile saline and bleeding was controlled with direct pressure. Minimal blood loss.  Post debridement measurements are contained in the above progress note. The patient tolerated this well. Wound was then dressed with Sarah and dressed with gauze, kerlix, and ACE.   Patient instructed to keep dressing dry, clean and intact until home health comes out.   Patient instructed to continue to ambulate in surgical shoe and walker.   Refill completed for Norco 10-325mg to be taken as needed for pain. Patient advised on the possible elevation of blood pressure or heart effects and caution to take pills as needed and to discontinue use if symptoms arise, patient agreed.   Prescription written for Diabetic shoes and inserts.   Patient should call the clinic immediately if any signs of infection such as fever chills  sweats increased redness or pain.  Patient to return in 1 week or sooner if needed.            Barb Veras DPM  [facility]

## 2020-11-23 NOTE — PROGRESS NOTES
"Individual Psychotherapy (PhD/LCSW)    The patient location is: [place], [place]  The chief complaint leading to consultation is: Depression, Anxiety, and Chronic Pain    Visit type: audio only    Face to Face time with patient: 0  60 minutes of total time spent on the encounter, which includes face to face time and non-face to face time preparing to see the patient (eg, review of tests), Obtaining and/or reviewing separately obtained history, Documenting clinical information in the electronic or other health record, Independently interpreting results (not separately reported) and communicating results to the patient/family/caregiver, or Care coordination (not separately reported).     Each patient to whom he or she provides medical services by telemedicine is:  (1) informed of the relationship between the physician and patient and the respective role of any other health care provider with respect to management of the patient; and (2) notified that he or she may decline to receive medical services by telemedicine and may withdraw from such care at any time.    11/23/2020    Site:  [facility]         Therapeutic Intervention: Met with patient.  Outpatient - Insight oriented psychotherapy 60 min - CPT code 10107 and Outpatient - Behavior modifying psychotherapy 60 min - CPT code 53479    Chief complaint/reason for encounter: depression, anxiety and sleep     Interval history and content of current session:   Shoulder pain is increasing. Pt discussed potentially needing rotator cuff surgery in January. States he is unable to do much with this arm and is therefore overusing his "good" shoulder/arm. Expressed being ready to move forward with surgery if that is what will allow better mobility/use. Currently, he is having increased tingling in both hands and finger tips. States it has become increasingly worse but has been present for months. Reports following up with his PCP in regards to this.     Discussed patient's " relationship with his 6 children and 2 step-children. He is close to his daughter and her children and also with his two step sons. He talks to his son who lives in [place] every day, sometimes twice a day. His daughter and her children come and see patient roughly once a month. This daughters mother (pt's wife 40+ years ago) passed away from Covid-19 one month ago. Patient discussed his grief and sense of loss as she and him had reconnected roughly one year ago. Pt does have strong support from 3 of his children and his grown grandchildren, all who communicate with him on a regular basis.     Treatment plan:  · Target symptoms: lack of focus, recurrent depression, anxiety   · Why chosen therapy is appropriate versus another modality: relevant to diagnosis, patient responds to this modality, evidence based practice  · Outcome monitoring methods: self-report, observation, checklist/rating scale  · Therapeutic intervention type: insight oriented psychotherapy, behavior modifying psychotherapy    Risk parameters:  Patient reports no suicidal ideation  Patient reports no homicidal ideation  Patient reports no self-injurious behavior  Patient reports no violent behavior    Verbal deficits: None    Patient's response to intervention:  The patient's response to intervention is accepting, motivated.    Treatment Goals:  Mental:  Incorporating change of environment on a regular basis. Encouraged patient to spend time outside each day, if weather permits. Pt expressed how interactions with others bring him enjoyment. Encouraged him to inquire about returning to [place] where he participated in a day program.   Physical:  Continue to increase mobility and strength through walking.     Progress toward goals:  The patient's progress toward goals is good.    Diagnosis:     ICD-10-CM ICD-9-CM   1. Severe episode of recurrent major depressive disorder, without psychotic features  F33.2 296.33   2. DOMINIK (generalized anxiety disorder)   F41.1 300.02   3. Other chronic pain  G89.29 338.29       Plan:  individual psychotherapy    Return to clinic: 2 weeks    Length of Service (minutes): 60

## 2020-11-24 ENCOUNTER — OFFICE VISIT (OUTPATIENT)
Dept: OPHTHALMOLOGY | Facility: CLINIC | Age: 65
End: 2020-11-24
Payer: MEDICARE

## 2020-11-24 ENCOUNTER — OFFICE VISIT (OUTPATIENT)
Dept: INTERNAL MEDICINE | Facility: CLINIC | Age: 65
End: 2020-11-24
Payer: MEDICARE

## 2020-11-24 ENCOUNTER — TELEPHONE (OUTPATIENT)
Dept: INTERNAL MEDICINE | Facility: CLINIC | Age: 65
End: 2020-11-24

## 2020-11-24 VITALS
HEIGHT: 65 IN | RESPIRATION RATE: 20 BRPM | BODY MASS INDEX: 31.29 KG/M2 | WEIGHT: 187.81 LBS | TEMPERATURE: 99 F | HEART RATE: 81 BPM | SYSTOLIC BLOOD PRESSURE: 122 MMHG | DIASTOLIC BLOOD PRESSURE: 72 MMHG

## 2020-11-24 DIAGNOSIS — E11.59 HYPERTENSION ASSOCIATED WITH DIABETES: Chronic | ICD-10-CM

## 2020-11-24 DIAGNOSIS — I15.2 HYPERTENSION ASSOCIATED WITH DIABETES: Chronic | ICD-10-CM

## 2020-11-24 DIAGNOSIS — H40.1132 PRIMARY OPEN ANGLE GLAUCOMA (POAG) OF BOTH EYES, MODERATE STAGE: ICD-10-CM

## 2020-11-24 DIAGNOSIS — G56.03 BILATERAL CARPAL TUNNEL SYNDROME: Primary | ICD-10-CM

## 2020-11-24 DIAGNOSIS — H40.1134 PRIMARY OPEN ANGLE GLAUCOMA OF BOTH EYES, INDETERMINATE STAGE: ICD-10-CM

## 2020-11-24 PROBLEM — R53.1 WEAKNESS GENERALIZED: Status: RESOLVED | Noted: 2020-07-30 | Resolved: 2020-11-24

## 2020-11-24 PROCEDURE — 1125F AMNT PAIN NOTED PAIN PRSNT: CPT | Mod: HCNC,S$GLB,, | Performed by: FAMILY MEDICINE

## 2020-11-24 PROCEDURE — 99499 NO LOS: ICD-10-PCS | Mod: HCNC,S$GLB,, | Performed by: OPTOMETRIST

## 2020-11-24 PROCEDURE — 3078F PR MOST RECENT DIASTOLIC BLOOD PRESSURE < 80 MM HG: ICD-10-PCS | Mod: HCNC,CPTII,S$GLB, | Performed by: FAMILY MEDICINE

## 2020-11-24 PROCEDURE — 99999 PR PBB SHADOW E&M-EST. PATIENT-LVL V: CPT | Mod: PBBFAC,HCNC,, | Performed by: FAMILY MEDICINE

## 2020-11-24 PROCEDURE — 3008F BODY MASS INDEX DOCD: CPT | Mod: HCNC,CPTII,S$GLB, | Performed by: FAMILY MEDICINE

## 2020-11-24 PROCEDURE — 3074F PR MOST RECENT SYSTOLIC BLOOD PRESSURE < 130 MM HG: ICD-10-PCS | Mod: HCNC,CPTII,S$GLB, | Performed by: FAMILY MEDICINE

## 2020-11-24 PROCEDURE — 3074F SYST BP LT 130 MM HG: CPT | Mod: HCNC,CPTII,S$GLB, | Performed by: FAMILY MEDICINE

## 2020-11-24 PROCEDURE — 3044F HG A1C LEVEL LT 7.0%: CPT | Mod: HCNC,CPTII,S$GLB, | Performed by: FAMILY MEDICINE

## 2020-11-24 PROCEDURE — 99499 UNLISTED E&M SERVICE: CPT | Mod: HCNC,S$GLB,, | Performed by: OPTOMETRIST

## 2020-11-24 PROCEDURE — 3008F PR BODY MASS INDEX (BMI) DOCUMENTED: ICD-10-PCS | Mod: HCNC,CPTII,S$GLB, | Performed by: FAMILY MEDICINE

## 2020-11-24 PROCEDURE — 3044F PR MOST RECENT HEMOGLOBIN A1C LEVEL <7.0%: ICD-10-PCS | Mod: HCNC,CPTII,S$GLB, | Performed by: FAMILY MEDICINE

## 2020-11-24 PROCEDURE — 99214 OFFICE O/P EST MOD 30 MIN: CPT | Mod: HCNC,S$GLB,, | Performed by: FAMILY MEDICINE

## 2020-11-24 PROCEDURE — 1125F PR PAIN SEVERITY QUANTIFIED, PAIN PRESENT: ICD-10-PCS | Mod: HCNC,S$GLB,, | Performed by: FAMILY MEDICINE

## 2020-11-24 PROCEDURE — 99214 PR OFFICE/OUTPT VISIT, EST, LEVL IV, 30-39 MIN: ICD-10-PCS | Mod: HCNC,S$GLB,, | Performed by: FAMILY MEDICINE

## 2020-11-24 PROCEDURE — 3078F DIAST BP <80 MM HG: CPT | Mod: HCNC,CPTII,S$GLB, | Performed by: FAMILY MEDICINE

## 2020-11-24 PROCEDURE — 99999 PR PBB SHADOW E&M-EST. PATIENT-LVL V: ICD-10-PCS | Mod: PBBFAC,HCNC,, | Performed by: FAMILY MEDICINE

## 2020-11-24 RX ORDER — LATANOPROST 50 UG/ML
1 SOLUTION/ DROPS OPHTHALMIC NIGHTLY
Qty: 2.5 ML | Refills: 6 | Status: SHIPPED | OUTPATIENT
Start: 2020-11-24 | End: 2021-05-18 | Stop reason: SDUPTHER

## 2020-11-24 NOTE — TELEPHONE ENCOUNTER
Please review and see if patient should still be taking azathioprine (imuran) 50 mg po qd.     Pt brought bag of meds today for nurse to put in daily pill dispenser.

## 2020-11-24 NOTE — PROGRESS NOTES
Subjective:       Patient ID: Crow Green is a 65 y.o. male.    Chief Complaint: Tingling to fingers    HPI  Came in today with a new complaint of tingling his fingers.  Says that this has been bothering him more the last few weeks.  Has past medical history significant for diabetic neuropathy and is on 800 mg of gabapentin 3 times a day already.  Says that he does have some decreased  strength in both of his hands and the symptoms of numbness and tingling fluctuate.  Equal in both hands.  Not having any numbness of his forearms or upper arms.  Does have chronic shoulder issues.  Also needing all this medication organized.  He is not capable of organizing all of his medications in to weekly pill boxes .  Previously home health was doing this he no longer qualifies for health    Diabetes Management Status    Statin: Taking  ACE/ARB: Taking    Screening or Prevention Patient's value Goal Complete/Controlled?   HgA1C Testing and Control   Lab Results   Component Value Date    HGBA1C 6.6 (H) 10/01/2020      Annually/Less than 8% Yes   Lipid profile : 05/21/2020 Annually Yes   LDL control Lab Results   Component Value Date    LDLCALC 62.4 (L) 05/21/2020    Annually/Less than 100 mg/dl  Yes   Nephropathy screening Lab Results   Component Value Date    LABMICR 21.0 06/26/2020     Lab Results   Component Value Date    PROTEINUA Trace (A) 07/05/2020    Annually Yes   Blood pressure BP Readings from Last 1 Encounters:   11/24/20 122/72    Less than 140/90 Yes   Dilated retinal exam : 05/28/2020 Annually Yes   Foot exam   : 06/26/2020 Annually Yes     Dm     Family History   Problem Relation Age of Onset    Glaucoma Mother     Cataracts Mother     Cataracts Father     Glaucoma Sister     Cataracts Sister     Glaucoma Maternal Aunt     No Known Problems Brother     No Known Problems Daughter     No Known Problems Son     Prostate cancer Neg Hx        Current Outpatient Medications:     alcohol swabs  (ALCOHOL PREP PADS) PadM, Apply 1 each topically once daily., Disp: 100 each, Rfl: 0    allopurinoL (ZYLOPRIM) 100 MG tablet, Take 1 tablet (100 mg total) by mouth once daily., Disp: 30 tablet, Rfl: 2    aspirin (ECOTRIN) 81 MG EC tablet, Take 1 tablet (81 mg total) by mouth once daily., Disp: , Rfl: 0    atorvastatin (LIPITOR) 40 MG tablet, TAKE 1 TABLET EVERY EVENING, Disp: 90 tablet, Rfl: 4    azaTHIOprine (IMURAN) 50 mg Tab, Take 1 tablet (50 mg total) by mouth once daily., Disp: 90 tablet, Rfl: 0    baclofen (LIORESAL) 10 MG tablet, Take 1 tablet (10 mg total) by mouth once daily., Disp: 90 tablet, Rfl: 3    blood sugar diagnostic (TRUE METRIX GLUCOSE TEST STRIP) Strp, Use with blood glucose meter kit to test blood sugar four times  daily, Disp: 200 strip, Rfl: 99    blood-glucose meter (TRUE METRIX AIR GLUCOSE METER) kit, TEST FOUR TIMES DAILY BEFORE MEALS  AND EVERY NIGHT, Disp: 1 each, Rfl: 0    COLCRYS 0.6 mg tablet, TAKE 1 TABLET EVERY DAY, Disp: 30 tablet, Rfl: 0    diclofenac sodium (VOLTAREN) 1 % Gel, Apply 2 g topically 4 (four) times daily., Disp: 1 Tube, Rfl: 5    doxycycline (VIBRAMYCIN) 100 MG Cap, Take 1 capsule (100 mg total) by mouth 2 (two) times daily., Disp: 20 capsule, Rfl: 0    ELIQUIS 5 mg Tab, TAKE 1 TABLET TWICE DAILY, Disp: 60 tablet, Rfl: 11    fluocinonide (LIDEX) 0.05 % external solution, Apply topically 2 (two) times daily. Use on scalp., Disp: 60 mL, Rfl: 2    fluticasone-salmeterol diskus inhaler 250-50 mcg, Inhale 1 puff into the lungs 2 (two) times daily. Controller, Disp: 60 each, Rfl: 11    food supplemt, lactose-reduced (ENSURE) Liqd, Take 118 mLs by mouth 3 (three) times daily with meals., Disp: 1 Bottle, Rfl: 4    furosemide (LASIX) 40 MG tablet, TAKE 2 TABLETS EVERY MORNING  AND TAKE 1 TABLET EVERY EVENING, Disp: 270 tablet, Rfl: 0    gabapentin (NEURONTIN) 800 MG tablet, Take 1 tablet (800 mg total) by mouth 3 (three) times daily., Disp: 270 tablet, Rfl:  4    glimepiride (AMARYL) 2 MG tablet, TAKE 1 TABLET (2 MG TOTAL) BY MOUTH BEFORE BREAKFAST., Disp: 90 tablet, Rfl: 3    [START ON 11/25/2020] HYDROcodone-acetaminophen (NORCO)  mg per tablet, Take 1 tablet by mouth every 8 (eight) hours as needed for Pain., Disp: 20 tablet, Rfl: 0    inhalation spacing device, Use as directed for inhalation., Disp: 1 Device, Rfl: 0    insulin (LANTUS SOLOSTAR U-100 INSULIN) glargine 100 units/mL (3mL) SubQ pen, INJECT 35 UNITS SUBCUTANEOUSLY EVERY EVENING, Disp: 75 mL, Rfl: 4    insulin aspart U-100 (NOVOLOG FLEXPEN U-100 INSULIN) 100 unit/mL (3 mL) InPn pen, INJECT 10 UNITS SUBCUTANEOUSLY THREE TIMES DAILY WITH MEALS, Disp: 15 mL, Rfl: 0    insulin syringe-needle U-100 1 mL 31 gauge x 5/16 Syrg, , Disp: , Rfl:     ipratropium (ATROVENT) 0.02 % nebulizer solution, USE 1 VIAL VIA NEBULIZER FOUR TIMES DAILY, Disp: 937.5 mL, Rfl: 2    isosorbide mononitrate (IMDUR) 60 MG 24 hr tablet, Take 1 tablet (60 mg total) by mouth once daily., Disp: 90 tablet, Rfl: 3    lancets 32 gauge Misc, 1 lancet by Misc.(Non-Drug; Combo Route) route 2 (two) times daily., Disp: 100 each, Rfl: 11    latanoprost 0.005 % ophthalmic solution, Place 1 drop into both eyes every evening., Disp: 1 Bottle, Rfl: 6    lisinopriL 10 MG tablet, , Disp: , Rfl:     magnesium oxide (MAG-OX) 400 mg (241.3 mg magnesium) tablet, Take 1 tablet (400 mg total) by mouth once daily., Disp: , Rfl: 0    metFORMIN (GLUCOPHAGE-XR) 500 MG ER 24hr tablet, Take 2 tablets (1,000 mg total) by mouth 2 (two) times daily with meals., Disp: 360 tablet, Rfl: 3    metoprolol succinate (TOPROL-XL) 50 MG 24 hr tablet, Take 1 tablet (50 mg total) by mouth once daily., Disp: 90 tablet, Rfl: 3    nitroGLYCERIN (NITROSTAT) 0.3 MG SL tablet, PLACE 1 TABLET UNDER THE TONGUE EVERY 5 MINUTES AS NEEDED FOR CHEST PAIN, NO MORE THAN 3 TABLETS IN ONE DAY, Disp: 100 tablet, Rfl: 0    OZEMPIC 1 mg/dose (2 mg/1.5 mL) PnIj, Inject 0.75 mLs  "into the skin once a week., Disp: 3 mL, Rfl: 11    pantoprazole (PROTONIX) 40 MG tablet, Take 1 tablet (40 mg total) by mouth once daily., Disp: 30 tablet, Rfl: 11    pen needle, diabetic (BD ULTRA-FINE SHORT PEN NEEDLE) 31 gauge x 5/16" Ndle, Inject 1 each into the skin 3 (three) times daily., Disp: 100 each, Rfl: 11    predniSONE (DELTASONE) 5 MG tablet, Take 1 tablet (5 mg total) by mouth 2 (two) times daily., Disp: 60 tablet, Rfl: 0    sertraline (ZOLOFT) 100 MG tablet, Take 1 tablet (100 mg total) by mouth every evening., Disp: 90 tablet, Rfl: 4    silver sulfADIAZINE 1% (SILVADENE) 1 % cream, Apply topically 2 (two) times daily., Disp: 50 g, Rfl: 0    tamsulosin (FLOMAX) 0.4 mg Cap, Take 1 capsule (0.4 mg total) by mouth once daily., Disp: 30 capsule, Rfl: 11    traZODone (DESYREL) 100 MG tablet, Take 2 tablets (200 mg total) by mouth every evening., Disp: 180 tablet, Rfl: 1    triamcinolone acetonide 0.1% (KENALOG) 0.1 % ointment, Apply topically 2 (two) times daily. Use on legs and back, Disp: 454 g, Rfl: 0    VIOS AEROSOL DELIVERY SYSTEM Shefali, USE AS DIRESTED, Disp: , Rfl: 0    tiotropium (SPIRIVA WITH HANDIHALER) 18 mcg inhalation capsule, Inhale 1 capsule (18 mcg total) into the lungs once daily. Controller, Disp: 90 capsule, Rfl: 0    Review of Systems   Constitutional: Negative for chills and fever.   Eyes: Negative for visual disturbance.   Respiratory: Negative for cough and shortness of breath.    Cardiovascular: Negative for chest pain.   Gastrointestinal: Negative for abdominal pain.   Neurological: Positive for numbness. Negative for dizziness.       Objective:   /72 (BP Location: Left arm, Patient Position: Sitting, BP Method: Medium (Manual))   Pulse 81   Temp 98.7 °F (37.1 °C) (Temporal)   Resp 20   Ht 5' 5" (1.651 m)   Wt 85.2 kg (187 lb 13.3 oz)   BMI 31.26 kg/m²      Physical Exam  Vitals signs reviewed.   Constitutional:       Appearance: He is well-developed.   HENT: "      Head: Normocephalic and atraumatic.   Eyes:      Conjunctiva/sclera: Conjunctivae normal.   Cardiovascular:      Rate and Rhythm: Normal rate.   Pulmonary:      Effort: Pulmonary effort is normal. No respiratory distress.   Musculoskeletal:      Comments: Positive tinel's b/l   Skin:     General: Skin is warm and dry.      Findings: No rash.   Neurological:      Mental Status: He is alert and oriented to person, place, and time.      Coordination: Coordination normal.   Psychiatric:         Behavior: Behavior normal.         Assessment & Plan     Problem List Items Addressed This Visit        Neuro    Bilateral carpal tunnel syndrome - Primary    Current Assessment & Plan     Advised wrist splinting.             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 Continue on same meds.          Relevant Orders    Ambulatory referral/consult to Outpatient Case Management       Cardiac/Vascular    Hypertension associated with diabetes (Chronic)    Current Assessment & Plan     bp well controlled. Continue on same meds                 Immunizations Administered on Date of Encounter - 11/24/2020     No immunizations on file.           No follow-ups on file.    Disclaimer:  This note may have been prepared using voice recognition software, it may have not been extensively proofed, as such there could be errors within the text such as sound alike errors.

## 2020-11-24 NOTE — PROGRESS NOTES
HPI     No appointment today.  Just needed latanoprost refill and schedule appointment with Dr Capps for   macular edema ck    Last edited by Lalo Galindo, OD on 11/24/2020 11:29 AM. (History)            Assessment /Plan     For exam results, see Encounter Report.    Primary open angle glaucoma of both eyes, indeterminate stage  -     latanoprost 0.005 % ophthalmic solution; Place 1 drop into both eyes every evening.  Dispense: 1 Bottle; Refill: 6    Primary open angle glaucoma (POAG) of both eyes, moderate stage  -     latanoprost 0.005 % ophthalmic solution; Place 1 drop into both eyes every evening.  Dispense: 1 Bottle; Refill: 6      Refills

## 2020-11-25 NOTE — TELEPHONE ENCOUNTER
Can continue on imuran (azathioprine) but needs follow up with rheumatology who should be sending refills.

## 2020-12-02 ENCOUNTER — OUTPATIENT CASE MANAGEMENT (OUTPATIENT)
Dept: ADMINISTRATIVE | Facility: OTHER | Age: 65
End: 2020-12-02

## 2020-12-02 ENCOUNTER — OFFICE VISIT (OUTPATIENT)
Dept: PODIATRY | Facility: CLINIC | Age: 65
End: 2020-12-02
Payer: MEDICARE

## 2020-12-02 VITALS
BODY MASS INDEX: 31.29 KG/M2 | DIASTOLIC BLOOD PRESSURE: 71 MMHG | SYSTOLIC BLOOD PRESSURE: 114 MMHG | WEIGHT: 187.81 LBS | HEART RATE: 78 BPM | HEIGHT: 65 IN

## 2020-12-02 DIAGNOSIS — L97.522 ULCER OF LEFT FOOT, WITH FAT LAYER EXPOSED: Primary | ICD-10-CM

## 2020-12-02 DIAGNOSIS — G89.18 ACUTE POST-OPERATIVE PAIN: ICD-10-CM

## 2020-12-02 DIAGNOSIS — T87.81 DEHISCENCE OF AMPUTATION STUMP: ICD-10-CM

## 2020-12-02 DIAGNOSIS — E11.49 TYPE II DIABETES MELLITUS WITH NEUROLOGICAL MANIFESTATIONS: ICD-10-CM

## 2020-12-02 PROCEDURE — 3008F BODY MASS INDEX DOCD: CPT | Mod: HCNC,CPTII,S$GLB, | Performed by: PODIATRIST

## 2020-12-02 PROCEDURE — 3008F PR BODY MASS INDEX (BMI) DOCUMENTED: ICD-10-PCS | Mod: HCNC,CPTII,S$GLB, | Performed by: PODIATRIST

## 2020-12-02 PROCEDURE — 1125F PR PAIN SEVERITY QUANTIFIED, PAIN PRESENT: ICD-10-PCS | Mod: HCNC,S$GLB,, | Performed by: PODIATRIST

## 2020-12-02 PROCEDURE — 99499 UNLISTED E&M SERVICE: CPT | Mod: HCNC,S$GLB,, | Performed by: PODIATRIST

## 2020-12-02 PROCEDURE — 11042 PR DEBRIDEMENT, SKIN, SUB-Q TISSUE,=<20 SQ CM: ICD-10-PCS | Mod: HCNC,S$GLB,, | Performed by: PODIATRIST

## 2020-12-02 PROCEDURE — 99999 PR PBB SHADOW E&M-EST. PATIENT-LVL V: CPT | Mod: PBBFAC,HCNC,, | Performed by: PODIATRIST

## 2020-12-02 PROCEDURE — 99999 PR PBB SHADOW E&M-EST. PATIENT-LVL V: ICD-10-PCS | Mod: PBBFAC,HCNC,, | Performed by: PODIATRIST

## 2020-12-02 PROCEDURE — 99499 NO LOS: ICD-10-PCS | Mod: HCNC,S$GLB,, | Performed by: PODIATRIST

## 2020-12-02 PROCEDURE — 11042 DBRDMT SUBQ TIS 1ST 20SQCM/<: CPT | Mod: HCNC,S$GLB,, | Performed by: PODIATRIST

## 2020-12-02 PROCEDURE — 1125F AMNT PAIN NOTED PAIN PRSNT: CPT | Mod: HCNC,S$GLB,, | Performed by: PODIATRIST

## 2020-12-02 RX ORDER — HYDROCODONE BITARTRATE AND ACETAMINOPHEN 10; 325 MG/1; MG/1
1 TABLET ORAL EVERY 8 HOURS PRN
Qty: 20 TABLET | Refills: 0 | Status: SHIPPED | OUTPATIENT
Start: 2020-12-02 | End: 2020-12-10 | Stop reason: SDUPTHER

## 2020-12-02 NOTE — LETTER
December 3, 2020    Crow Green  [address]  [facility] [address]             [facility] [address] [place] [address] Dear Mr. Green,    I work with [facility]'s Outpatient Case Management Department. We received a referral to call you to discuss your medical history.These services are free of charge and are offered to [facility] patients who have recently been discharged from any of our facilities or who have complex medical conditions that may require the skill of a nurse to assist with management.         .      I attempted to reach you by telephone, but I was unsuccessful. Please call our department so that we can go over some questions with you regarding your health.    The Outpatient Case Management Department can be reached at [phone] from 8:00AM to 4:30 PM on Monday thru Friday. [facility] also has a program where a nurse is available 24/7 to answer questions or provide medical advice, their number is [phone].    Thanks,        Rosa Marrreo RN  Outpatient Care Management

## 2020-12-02 NOTE — PROGRESS NOTES
Subjective:     Patient ID: Crow Green is a 65 y.o. male.    Chief Complaint: Follow-up (1 week wound check c/o pain 6/10, diabetic pt wears tennis shoes with socks, PCP Dr. Lambert last seen on 10/27/20)    Crow is a 65 y.o. male who presents to the clinic for evaluation and treatment of high risk feet. Crow has a past medical history of Arthritis, Asthma, Atrial fibrillation, Atrial fibrillation with RVR (8/7/2019), CHF (congestive heart failure), Chronic obstructive pulmonary disease (8/5/2020), Depression, Dermatomyositis, Diabetes mellitus, type 2 (Diagnosed in 2000), Elevated PSA, Follow up (7/30/2020), Hypertension, and Sleep apnea. The patient's chief complaint left foot ulcer. Patient states he kept dressing dry, clean, and intact since last appointment. Patient states his pain has eased up in the day time and only takes the pain medication at night time. This patient has documented high risk feet requiring routine maintenance secondary to diabetes mellitis and those secondary complications of diabetes, as mentioned..    PCP: Mateo Lambert, DO    Date Last Seen by PCP: 10/27/2200    Current shoe gear:  Affected Foot: Surgical boot     Unaffected Foot: Tennis shoes    History of Trauma: negative  Sign of Infection: none    Hemoglobin A1C   Date Value Ref Range Status   10/01/2020 6.6 (H) 4.0 - 5.6 % Final     Comment:     ADA Screening Guidelines:  5.7-6.4%  Consistent with prediabetes  >or=6.5%  Consistent with diabetes  High levels of fetal hemoglobin interfere with the HbA1C  assay. Heterozygous hemoglobin variants (HbS, HgC, etc)do  not significantly interfere with this assay.   However, presence of multiple variants may affect accuracy.     05/19/2020 7.7 (H) 4.0 - 5.6 % Final     Comment:     ADA Screening Guidelines:  5.7-6.4%  Consistent with prediabetes  >or=6.5%  Consistent with diabetes  High levels of fetal hemoglobin interfere with the HbA1C  assay. Heterozygous hemoglobin  variants (HbS, HgC, etc)do  not significantly interfere with this assay.   However, presence of multiple variants may affect accuracy.     12/13/2019 7.5 (H) 4.0 - 5.6 % Final     Comment:     ADA Screening Guidelines:  5.7-6.4%  Consistent with prediabetes  >or=6.5%  Consistent with diabetes  High levels of fetal hemoglobin interfere with the HbA1C  assay. Heterozygous hemoglobin variants (HbS, HgC, etc)do  not significantly interfere with this assay.   However, presence of multiple variants may affect accuracy.         Patient Active Problem List   Diagnosis    ED (erectile dysfunction)    Non-proliferative diabetic retinopathy, mild, both eyes    Hypertension associated with diabetes    Diabetic polyneuropathy    Nonobstructive coronary artery disease of native artery of native heart    Chronic combined systolic and diastolic HFrEF of 45%    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, with long-term current use of insulin    SANDRITA on CPAP    Moderate asthma    Psychophysiological insomnia    Nightmares    Sleep related gastroesophageal reflux disease    Inadequate sleep hygiene    PAF (paroxysmal atrial fibrillation)    At risk for medication noncompliance    Obesity (BMI 30.0-34.9)    Indeterminate stage chronic open angle glaucoma    Right hip pain    Gait instability    Chronic right shoulder pain    Arthritis    Left sided sciatica    Dyspnea on exertion    Osteoarthritis of spine with radiculopathy, lumbar region    HNP (herniated nucleus pulposus), lumbar    Gastroesophageal reflux disease without esophagitis    Chronic diastolic heart failure    Hypogonadism in male    Disorder of parathyroid gland    Hyperlipidemia associated with type 2 diabetes mellitus    JOSE MARIA (acute kidney injury)    LRTI (lower respiratory tract infection)    Physical deconditioning    Acute pain of left shoulder    Traumatic tear of left rotator  cuff    Atrial fibrillation with RVR    Other chronic pain    Left shoulder pain    Complete tear of left rotator cuff    Stage 3 chronic kidney disease    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 associated with type 2 diabetes mellitus    Lumbar radiculopathy    Diabetic ulcer of toe of left foot associated with type 2 diabetes mellitus, with necrosis of muscle    Ulcer of left foot    Elevated troponin    Hypotension due to medication    Dermatomyositis    Postprocedural hypotension    Follow up    Advanced directives, counseling/discussion    Cocaine dependence, in remission    Schizoaffective disorder, depressive type    Chronic obstructive pulmonary disease    Severe episode of recurrent major depressive disorder, without psychotic features    DOMINIK (generalized anxiety disorder)    Bilateral carpal tunnel syndrome       Medication List with Changes/Refills   Current Medications    ALCOHOL SWABS (ALCOHOL PREP PADS) PADM    Apply 1 each topically once daily.    ALLOPURINOL (ZYLOPRIM) 100 MG TABLET    Take 1 tablet (100 mg total) by mouth once daily.    ASPIRIN (ECOTRIN) 81 MG EC TABLET    Take 1 tablet (81 mg total) by mouth once daily.    ATORVASTATIN (LIPITOR) 40 MG TABLET    TAKE 1 TABLET EVERY EVENING    AZATHIOPRINE (IMURAN) 50 MG TAB    Take 1 tablet (50 mg total) by mouth once daily.    BACLOFEN (LIORESAL) 10 MG TABLET    Take 1 tablet (10 mg total) by mouth once daily.    BLOOD SUGAR DIAGNOSTIC (TRUE METRIX GLUCOSE TEST STRIP) STRP    Use with blood glucose meter kit to test blood sugar four times  daily    BLOOD-GLUCOSE METER (TRUE METRIX AIR GLUCOSE METER) KIT    TEST FOUR TIMES DAILY BEFORE MEALS  AND EVERY NIGHT    COLCRYS 0.6 MG TABLET    TAKE 1 TABLET EVERY DAY    DICLOFENAC SODIUM (VOLTAREN) 1 % GEL    Apply 2 g topically 4 (four) times daily.    DOXYCYCLINE (VIBRAMYCIN) 100 MG CAP    Take 1 capsule (100 mg total) by mouth 2 (two) times  daily.    ELIQUIS 5 MG TAB    TAKE 1 TABLET TWICE DAILY    FLUOCINONIDE (LIDEX) 0.05 % EXTERNAL SOLUTION    Apply topically 2 (two) times daily. Use on scalp.    FLUTICASONE-SALMETEROL DISKUS INHALER 250-50 MCG    Inhale 1 puff into the lungs 2 (two) times daily. Controller    FOOD SUPPLEMT, LACTOSE-REDUCED (ENSURE) LIQD    Take 118 mLs by mouth 3 (three) times daily with meals.    FUROSEMIDE (LASIX) 40 MG TABLET    TAKE 2 TABLETS EVERY MORNING  AND TAKE 1 TABLET EVERY EVENING    GABAPENTIN (NEURONTIN) 800 MG TABLET    Take 1 tablet (800 mg total) by mouth 3 (three) times daily.    GLIMEPIRIDE (AMARYL) 2 MG TABLET    TAKE 1 TABLET (2 MG TOTAL) BY MOUTH BEFORE BREAKFAST.    INHALATION SPACING DEVICE    Use as directed for inhalation.    INSULIN (LANTUS SOLOSTAR U-100 INSULIN) GLARGINE 100 UNITS/ML (3ML) SUBQ PEN    INJECT 35 UNITS SUBCUTANEOUSLY EVERY EVENING    INSULIN ASPART U-100 (NOVOLOG FLEXPEN U-100 INSULIN) 100 UNIT/ML (3 ML) INPN PEN    INJECT 10 UNITS SUBCUTANEOUSLY THREE TIMES DAILY WITH MEALS    INSULIN SYRINGE-NEEDLE U-100 1 ML 31 GAUGE X 5/16 SYRG        IPRATROPIUM (ATROVENT) 0.02 % NEBULIZER SOLUTION    USE 1 VIAL VIA NEBULIZER FOUR TIMES DAILY    ISOSORBIDE MONONITRATE (IMDUR) 60 MG 24 HR TABLET    Take 1 tablet (60 mg total) by mouth once daily.    LANCETS 32 GAUGE MISC    1 lancet by Misc.(Non-Drug; Combo Route) route 2 (two) times daily.    LATANOPROST 0.005 % OPHTHALMIC SOLUTION    Place 1 drop into both eyes every evening.    LISINOPRIL 10 MG TABLET        MAGNESIUM OXIDE (MAG-OX) 400 MG (241.3 MG MAGNESIUM) TABLET    Take 1 tablet (400 mg total) by mouth once daily.    METFORMIN (GLUCOPHAGE-XR) 500 MG ER 24HR TABLET    Take 2 tablets (1,000 mg total) by mouth 2 (two) times daily with meals.    METOPROLOL SUCCINATE (TOPROL-XL) 50 MG 24 HR TABLET    Take 1 tablet (50 mg total) by mouth once daily.    NITROGLYCERIN (NITROSTAT) 0.3 MG SL TABLET    PLACE 1 TABLET UNDER THE TONGUE EVERY 5 MINUTES AS  "NEEDED FOR CHEST PAIN, NO MORE THAN 3 TABLETS IN ONE DAY    OZEMPIC 1 MG/DOSE (2 MG/1.5 ML) PNIJ    Inject 0.75 mLs into the skin once a week.    PANTOPRAZOLE (PROTONIX) 40 MG TABLET    Take 1 tablet (40 mg total) by mouth once daily.    PEN NEEDLE, DIABETIC (BD ULTRA-FINE SHORT PEN NEEDLE) 31 GAUGE X 5/16" NDLE    Inject 1 each into the skin 3 (three) times daily.    PREDNISONE (DELTASONE) 5 MG TABLET    Take 1 tablet (5 mg total) by mouth 2 (two) times daily.    SERTRALINE (ZOLOFT) 100 MG TABLET    Take 1 tablet (100 mg total) by mouth every evening.    SILVER SULFADIAZINE 1% (SILVADENE) 1 % CREAM    Apply topically 2 (two) times daily.    TAMSULOSIN (FLOMAX) 0.4 MG CAP    Take 1 capsule (0.4 mg total) by mouth once daily.    TIOTROPIUM (SPIRIVA WITH HANDIHALER) 18 MCG INHALATION CAPSULE    Inhale 1 capsule (18 mcg total) into the lungs once daily. Controller    TRAZODONE (DESYREL) 100 MG TABLET    Take 2 tablets (200 mg total) by mouth every evening.    TRIAMCINOLONE ACETONIDE 0.1% (KENALOG) 0.1 % OINTMENT    Apply topically 2 (two) times daily. Use on legs and back    VIOS AEROSOL DELIVERY SYSTEM VIN    USE AS DIRESTED   Changed and/or Refilled Medications    Modified Medication Previous Medication    HYDROCODONE-ACETAMINOPHEN (NORCO)  MG PER TABLET HYDROcodone-acetaminophen (NORCO)  mg per tablet       Take 1 tablet by mouth every 8 (eight) hours as needed for Pain.    Take 1 tablet by mouth every 8 (eight) hours as needed for Pain.       Review of patient's allergies indicates:   Allergen Reactions    Protamine Hives     Urticaria, possible upper airway swelling       Past Surgical History:   Procedure Laterality Date    ABLATION OF ARRHYTHMOGENIC FOCUS FOR ATRIAL FIBRILLATION N/A 2/27/2020    Procedure: Ablation atrial fibrillation;  Surgeon: Gio Brown MD;  Location: [facility] EP LAB;  Service: Cardiology;  Laterality: N/A;  afib, DAMIEN (cx if SR), PVI, PITO, anes, MB, 3 Prep    " CHOLECYSTECTOMY      CLOSURE OF WOUND Left 7/1/2020    Procedure: CLOSURE, WOUND;  Surgeon: Barb Veras DPM;  Location: [facility] OR;  Service: Podiatry;  Laterality: Left;    SELECTIVE INJECTION OF ANESTHETIC AGENT AROUND LUMBAR SPINAL NERVE ROOT BY TRANSFORAMINAL APPROACH Left 6/11/2020    Procedure: BLOCK, SPINAL NERVE ROOT, LUMBAR, SELECTIVE, TRANSFORAMINAL APPROACH Left L4-5, L5-S1 TFESI with RN IV sedation;  Surgeon: Dillan Tsai MD;  Location: [facility] PAIN MGT;  Service: Pain Management;  Laterality: Left;    TOE AMPUTATION Left 6/29/2020    Procedure: AMPUTATION, TOE;  Surgeon: Barb Veras DPM;  Location: [facility] OR;  Service: Podiatry;  Laterality: Left;  great toe       Family History   Problem Relation Age of Onset    Glaucoma Mother     Cataracts Mother     Cataracts Father     Glaucoma Sister     Cataracts Sister     Glaucoma Maternal Aunt     No Known Problems Brother     No Known Problems Daughter     No Known Problems Son     Prostate cancer Neg Hx        Social History     Socioeconomic History    Marital status: Legally      Spouse name: Not on file    Number of children: 5    Years of education: Not on file    Highest education level: Not on file   Occupational History    Occupation: disabled   Social Needs    Financial resource strain: Very hard    Food insecurity     Worry: Often true     Inability: Often true    Transportation needs     Medical: Yes     Non-medical: Yes   Tobacco Use    Smoking status: Never Smoker    Smokeless tobacco: Never Used   Substance and Sexual Activity    Alcohol use: Not Currently    Drug use: No    Sexual activity: Yes     Partners: Female   Lifestyle    Physical activity     Days per week: 0 days     Minutes per session: 0 min    Stress: Rather much   Relationships    Social connections     Talks on phone: Three times a week     Gets together: Never     Attends [employer] service: More than 4 times per year     Active member of  "club or organization: No     Attends meetings of clubs or organizations: Never     Relationship status:    Other Topics Concern    Not on file   Social History Narrative    Utilizing [employer] transportation which has been unreliable.        Vitals:    12/02/20 1033   BP: 114/71   Pulse: 78   Weight: 85.2 kg (187 lb 13.3 oz)   Height: 5' 5" (1.651 m)   PainSc:   6       Hemoglobin A1C   Date Value Ref Range Status   10/01/2020 6.6 (H) 4.0 - 5.6 % Final     Comment:     ADA Screening Guidelines:  5.7-6.4%  Consistent with prediabetes  >or=6.5%  Consistent with diabetes  High levels of fetal hemoglobin interfere with the HbA1C  assay. Heterozygous hemoglobin variants (HbS, HgC, etc)do  not significantly interfere with this assay.   However, presence of multiple variants may affect accuracy.     05/19/2020 7.7 (H) 4.0 - 5.6 % Final     Comment:     ADA Screening Guidelines:  5.7-6.4%  Consistent with prediabetes  >or=6.5%  Consistent with diabetes  High levels of fetal hemoglobin interfere with the HbA1C  assay. Heterozygous hemoglobin variants (HbS, HgC, etc)do  not significantly interfere with this assay.   However, presence of multiple variants may affect accuracy.     12/13/2019 7.5 (H) 4.0 - 5.6 % Final     Comment:     ADA Screening Guidelines:  5.7-6.4%  Consistent with prediabetes  >or=6.5%  Consistent with diabetes  High levels of fetal hemoglobin interfere with the HbA1C  assay. Heterozygous hemoglobin variants (HbS, HgC, etc)do  not significantly interfere with this assay.   However, presence of multiple variants may affect accuracy.         Review of Systems   Constitutional: Negative for chills and fever.   Respiratory: Negative for shortness of breath.    Cardiovascular: Negative for chest pain, palpitations, orthopnea, claudication and leg swelling.   Gastrointestinal: Negative for diarrhea, nausea and vomiting.   Musculoskeletal: Negative for joint pain.   Skin: Negative for rash.   Neurological: " Positive for tingling and sensory change.   Psychiatric/Behavioral: Negative.            Objective:      PHYSICAL EXAM: Apperance: Alert and orient in no distress,well developed, and with good attention to grooming and body habits  Patient presents ambulating in surgical shoe and dressing to left foot and tennis shoe on the right.   Lower Extremity Exam  VASCULAR: Dorsalis pedis pulses 2/4 left and Posterior Tibial pulses 2/4 left. Capillary fill time <4 seconds left. No edema observed left. Varicosities present left. Skin temperature of the lower extremities is warm to warm, proximal to distal. Hair growth dim left. (--) lymphangitis or (--) cellulitis noted left.  DERMATOLOGICAL: (--) edema, (--) erythema, (--) malodor, (+) decreased drainage, (+) decreased warmth to left foot. Ulcer measurement (post) pinpoint.  The ulcer does not extend into deeper tissue and (--) sinus tracts exist.  Previous ulcer noted to left medial 1st metatarsal head closed with thin epithealized tissue. The dorsum surface of the feet are soft and supple.  The plantar aspects of feet are dry and scaly. Webspaces 2,3,4 clean, dry and without evidence of break in skin integrity left.   NEUROLOGICAL: Light touch, sharp-dull, proprioception all present and equal bilaterally. Protective sensation absent sites as tested with a [place]-Kimmie 5.07 monofilament.   MUSCULOSKELETAL: Muscle strength is 5/5 for foot inverters, everters, plantarflexors, and dorsiflexors. Muscle tone is normal. Left hallux amputation noted.           Assessment:       Encounter Diagnoses   Name Primary?    Ulcer of left foot, with fat layer exposed - Left Foot Yes    Dehiscence of amputation stump - Left Foot     Acute post-operative pain - Left Foot     Type II diabetes mellitus with neurological manifestations          Plan:   Ulcer of left foot, with fat layer exposed - Left Foot  -     HYDROcodone-acetaminophen (NORCO)  mg per tablet; Take 1 tablet by  mouth every 8 (eight) hours as needed for Pain.  Dispense: 20 tablet; Refill: 0    Dehiscence of amputation stump - Left Foot  -     HYDROcodone-acetaminophen (NORCO)  mg per tablet; Take 1 tablet by mouth every 8 (eight) hours as needed for Pain.  Dispense: 20 tablet; Refill: 0    Acute post-operative pain - Left Foot  -     HYDROcodone-acetaminophen (NORCO)  mg per tablet; Take 1 tablet by mouth every 8 (eight) hours as needed for Pain.  Dispense: 20 tablet; Refill: 0    Type II diabetes mellitus with neurological manifestations      I counseled the patient on his conditions, regarding findings of my examination, my impressions, and usual treatment plan.   Left medial 1st metatarsal ulcer sharply excisionally debrided of viable and nonviable tissue from the wound periphery and base. I redefined the wound borders in the process. Debridement was carried into and did include the subcutaneous layer down to good bleeding granular tissue base utilizing sterile #15 blade and tissue nipper and forceps. Wound was irrigated with sterile saline and bleeding was controlled with direct pressure. Minimal blood loss.  Post debridement measurements are contained in the above progress note. The patient tolerated this well. Wound was then dressed with Sarah and dressed with gauze, kerlix, and ACE.   Patient instructed to keep dressing dry, clean and intact until home health comes out.   Patient instructed to continue to ambulate in surgical shoe and walker.   Refill completed for Norco 10-325mg to be taken as needed for pain. Patient advised on the possible elevation of blood pressure or heart effects and caution to take pills as needed and to discontinue use if symptoms arise, patient agreed.   Patient instructed to bring left diabetic shoe with him next week.   Patient should call the clinic immediately if any signs of infection such as fever chills sweats increased redness or pain.  Patient to return in 1 week or  sooner if needed.            Barb Veras DPM  [facility]

## 2020-12-02 NOTE — LETTER
December 8, 2020           Dear Crow,     Welcome to [facility] Complex Care [facility].  It was a pleasure talking with you today.  My name is Rosa Marrero, and I look forward to being your Care Manager.  My goal is to help you function at the healthiest and highest level possible.  You can contact me directly at [phone].    As an [facility] patient with [employer], some of the services we may be able to provide include:      Development of an individualized care plan with a Registered Nurse    Connection with a    Connection with available resources and services     Coordinate communication among your care team members    Provide coaching and education    Help you understand your doctors treatment plan   Help you obtain information about your insurance coverage.     All services provided by [facility]s Complex Care Managers and other care team members are coordinated with and communicated to your primary care team.    As part of your enrollment, you will be receiving education materials and more information about these services in your [facility] account, by phone or through the mail.  If you do not wish to participate or receive information, please contact our office at [phone].      Sincerely,        Rosa Marrero, RN  [facility]   Out-patient RN Complex Care Manager

## 2020-12-04 ENCOUNTER — TELEPHONE (OUTPATIENT)
Dept: PRIMARY CARE CLINIC | Facility: CLINIC | Age: 65
End: 2020-12-04

## 2020-12-04 NOTE — PROGRESS NOTES
CHW contacted Mr. Green to remind patient of his appointment on 12- with Awilda Nogueira LCSW. Patient confirmed the appointment.

## 2020-12-07 PROBLEM — Z09 FOLLOW UP: Status: RESOLVED | Noted: 2020-07-30 | Resolved: 2020-12-07

## 2020-12-08 NOTE — PROGRESS NOTES
"Outpatient Care Management  Initial Patient Assessment    Patient: Crow Green  MRN: [id number]  Date of Service: 12/02/2020  Completed by: Rosa Marrero RN  Referral Date: 11/24/2020  Program: Disease Management (Diabetes & COPD)    Reason for Visit   Patient presents with    OPCM Chart Review     12/2/20    OPCM RN First Assessment Attempt     12/3/20    OPCM RN Second Assessment Attempt     12/4/20    Initial Assessment     12/8/20    PHQ-9     12/8/20    Plan Of Care     12/8/20       Brief Summary:  Initial assessment completed with pt. Pt is a 65 yr old male with PMH that includes DM, Depression and Falls. Pt referred by PCP- Dr. Lambert for assistance with managing his medications. Pt was receiving HH and they would fill his pill box but HH has ended. Pt reports he goes to his PCP's office every 2 weeks with all of his medications and the nurse there fills his pill box. Pt agrees to participate in OPCM, pt aware he can opt out at any time and there is no cost for this program. Pt reports he lives with his father and has a ramp on the house so he can safely get in and out. Pt reports numerous falls in the last 12 months and ambulates with a cane or uses a wheelchair. Pt declined to do medication reconciliation, states he "cannot deal with that", "it is too much" for him and that he can read the medication names. Discussed with Pt spelling the names of each med slowly, reports he has trouble reading and refused to complete medication reconciliation with me. Pt scored a 19 on PHQ-9 denies SI/HI. Pt reports he talks to a LCSW with [facility] over the phone but could not recall her name or when he last spoke to her. Pt reports he uses [employer] transportation to get to his appointments and [facility] mail order pharmacy for his medications. Pt gave me permission to speak with his Aunt Katarzyna Biggs who lives nearby regarding his health if I cannot reach the Pt. Pt reports his Aunt cannot do his medications " "for him, neither can his father. Pt reports he needs assistance with food resources. Referral to LCSW for PHQ-9 of 19 and food resources. Message sent to PCP to report PHQ of 19. Pt reports his L great toe was removed several months ago and is "almost healed". Pt reports he will then get fitted for an insert to put in his show to help with balance. Pt reports he has someone that comes out for 4 hrs 1 day per week. Pt reports the lady does not help him bathe or fill his pill box. Pt declines assistance with advance directives at this time. Will follow up in 1 week, pt in agreement. JAMEEL Marrero RN              Assessment Documentation     OPCM Initial Assessment    Involvement of Care  Do I have permission to speak with other family members about your care?: Yes (Comment: Katarzyna Biggs [phone] (Aunt))  Assessment completed by: Patient  Identified Areas of Need  Advanced Care Planning: No (Comment: Pt declined any information at this time. )  Housing: no  Medication Adherence: Yes  *Active medication list was reviewed and reconciled with patient and/or caregiver:   Nutrition: no  Lab Adherence: no  Depression: Yes  Cognitive/Behavioral Health: no  Communication: no  Health Literacy: yes  Fall risk?: Yes  Equipment/Supplies/Services: no          Problem List and History     Problems Addressed This Visit    Atrial Fibrillation: Not identified by patient as current problem  COPD: Not identified by patient as current problem  Depression: Identified as current problem  Heart Failure: Not identified by patient as current problem  Hypertension: Not identified by patient as current problem  Diabetes: Not identified by patient as current problem  Chronic Kidney Disease: Not identified by patient as current problem  Hyperlipidemia: Not identified by patient as current problem  Glaucoma: Not identified by patient as current problem  Heart Disease: Not identified by patient as current problem  Asthma: Not identified by " patient as current problem         Reviewed medical and social history with patient and/or caregiver. A complex care plan was discussed and completed today, with input from patient and/or caregiver.    Patient Instructions     Instructions were provided via the [email or web address] patient resources and are available for the patient to view on the patient portal, if active.    Next steps:   Educational materials received, can his Aunt go over them with him  LCSW- referred for food and PHQ-19  PCP responded re: med rec, pill packing and PHQ  Educate on med compliance  Educate on falls  Discuss pill packing with Pt    Follow up in about 5 days (around 12/7/2020).    Todays OPCM Self-Management Care Plan was developed with the patients/caregivers input and was based on identified barriers from todays assessment.  Goals were written today with the patient/caregiver and the patient has agreed to work towards these goals to improve his/her overall well-being. Patient verbalized understanding of the care plan, goals, and all of today's instructions. Encouraged patient/caregiver to communicate with his/her physician and health care team about health conditions and the treatment plan.  Provided my contact information today and encouraged patient/caregiver to call me with any questions as needed.

## 2020-12-10 ENCOUNTER — TELEPHONE (OUTPATIENT)
Dept: INTERNAL MEDICINE | Facility: CLINIC | Age: 65
End: 2020-12-10

## 2020-12-10 ENCOUNTER — OFFICE VISIT (OUTPATIENT)
Dept: PODIATRY | Facility: CLINIC | Age: 65
End: 2020-12-10
Payer: MEDICARE

## 2020-12-10 ENCOUNTER — OFFICE VISIT (OUTPATIENT)
Dept: DIABETES | Facility: CLINIC | Age: 65
End: 2020-12-10
Payer: MEDICARE

## 2020-12-10 VITALS
HEIGHT: 65 IN | HEART RATE: 81 BPM | DIASTOLIC BLOOD PRESSURE: 95 MMHG | SYSTOLIC BLOOD PRESSURE: 173 MMHG | BODY MASS INDEX: 31.16 KG/M2 | WEIGHT: 187 LBS

## 2020-12-10 VITALS
SYSTOLIC BLOOD PRESSURE: 154 MMHG | HEART RATE: 79 BPM | WEIGHT: 187.81 LBS | DIASTOLIC BLOOD PRESSURE: 89 MMHG | BODY MASS INDEX: 31.26 KG/M2

## 2020-12-10 DIAGNOSIS — E11.65 UNCONTROLLED TYPE 2 DIABETES MELLITUS WITH HYPERGLYCEMIA: Primary | ICD-10-CM

## 2020-12-10 DIAGNOSIS — T87.81 DEHISCENCE OF AMPUTATION STUMP: ICD-10-CM

## 2020-12-10 DIAGNOSIS — E78.5 HYPERLIPIDEMIA ASSOCIATED WITH TYPE 2 DIABETES MELLITUS: ICD-10-CM

## 2020-12-10 DIAGNOSIS — E29.1 HYPOGONADISM IN MALE: ICD-10-CM

## 2020-12-10 DIAGNOSIS — I50.32 CHRONIC DIASTOLIC HEART FAILURE: ICD-10-CM

## 2020-12-10 DIAGNOSIS — N18.30 STAGE 3 CHRONIC KIDNEY DISEASE, UNSPECIFIED WHETHER STAGE 3A OR 3B CKD: ICD-10-CM

## 2020-12-10 DIAGNOSIS — I25.118 CORONARY ARTERY DISEASE OF NATIVE ARTERY OF NATIVE HEART WITH STABLE ANGINA PECTORIS: ICD-10-CM

## 2020-12-10 DIAGNOSIS — E11.59 HYPERTENSION ASSOCIATED WITH DIABETES: ICD-10-CM

## 2020-12-10 DIAGNOSIS — G89.18 ACUTE POST-OPERATIVE PAIN: ICD-10-CM

## 2020-12-10 DIAGNOSIS — L97.522 ULCER OF LEFT FOOT, WITH FAT LAYER EXPOSED: ICD-10-CM

## 2020-12-10 DIAGNOSIS — Z87.2 HEALED ULCER OF LEFT FOOT ON EXAMINATION: Primary | ICD-10-CM

## 2020-12-10 DIAGNOSIS — I15.2 HYPERTENSION ASSOCIATED WITH DIABETES: ICD-10-CM

## 2020-12-10 DIAGNOSIS — N52.9 ERECTILE DYSFUNCTION, UNSPECIFIED ERECTILE DYSFUNCTION TYPE: ICD-10-CM

## 2020-12-10 DIAGNOSIS — E11.69 HYPERLIPIDEMIA ASSOCIATED WITH TYPE 2 DIABETES MELLITUS: ICD-10-CM

## 2020-12-10 DIAGNOSIS — E66.9 OBESITY (BMI 30.0-34.9): ICD-10-CM

## 2020-12-10 DIAGNOSIS — E11.42 DIABETIC POLYNEUROPATHY ASSOCIATED WITH TYPE 2 DIABETES MELLITUS: ICD-10-CM

## 2020-12-10 DIAGNOSIS — E11.3312 MODERATE NONPROLIFERATIVE DIABETIC RETINOPATHY OF LEFT EYE WITH MACULAR EDEMA ASSOCIATED WITH TYPE 2 DIABETES MELLITUS: ICD-10-CM

## 2020-12-10 DIAGNOSIS — E21.5 DISORDER OF PARATHYROID GLAND: ICD-10-CM

## 2020-12-10 PROCEDURE — 3008F PR BODY MASS INDEX (BMI) DOCUMENTED: ICD-10-PCS | Mod: HCNC,CPTII,S$GLB, | Performed by: PHYSICIAN ASSISTANT

## 2020-12-10 PROCEDURE — 1125F PR PAIN SEVERITY QUANTIFIED, PAIN PRESENT: ICD-10-PCS | Mod: HCNC,S$GLB,, | Performed by: PODIATRIST

## 2020-12-10 PROCEDURE — 99212 PR OFFICE/OUTPT VISIT, EST, LEVL II, 10-19 MIN: ICD-10-PCS | Mod: HCNC,S$GLB,, | Performed by: PODIATRIST

## 2020-12-10 PROCEDURE — 3077F PR MOST RECENT SYSTOLIC BLOOD PRESSURE >= 140 MM HG: ICD-10-PCS | Mod: HCNC,CPTII,S$GLB, | Performed by: PODIATRIST

## 2020-12-10 PROCEDURE — 99999 PR PBB SHADOW E&M-EST. PATIENT-LVL II: ICD-10-PCS | Mod: PBBFAC,HCNC,, | Performed by: PHYSICIAN ASSISTANT

## 2020-12-10 PROCEDURE — 3008F PR BODY MASS INDEX (BMI) DOCUMENTED: ICD-10-PCS | Mod: HCNC,CPTII,S$GLB, | Performed by: PODIATRIST

## 2020-12-10 PROCEDURE — 3008F BODY MASS INDEX DOCD: CPT | Mod: HCNC,CPTII,S$GLB, | Performed by: PHYSICIAN ASSISTANT

## 2020-12-10 PROCEDURE — 99214 PR OFFICE/OUTPT VISIT, EST, LEVL IV, 30-39 MIN: ICD-10-PCS | Mod: HCNC,S$GLB,, | Performed by: PHYSICIAN ASSISTANT

## 2020-12-10 PROCEDURE — 99999 PR PBB SHADOW E&M-EST. PATIENT-LVL II: CPT | Mod: PBBFAC,HCNC,, | Performed by: PHYSICIAN ASSISTANT

## 2020-12-10 PROCEDURE — 3044F HG A1C LEVEL LT 7.0%: CPT | Mod: HCNC,CPTII,S$GLB, | Performed by: PHYSICIAN ASSISTANT

## 2020-12-10 PROCEDURE — 3044F PR MOST RECENT HEMOGLOBIN A1C LEVEL <7.0%: ICD-10-PCS | Mod: HCNC,CPTII,S$GLB, | Performed by: PHYSICIAN ASSISTANT

## 2020-12-10 PROCEDURE — 99212 OFFICE O/P EST SF 10 MIN: CPT | Mod: HCNC,S$GLB,, | Performed by: PODIATRIST

## 2020-12-10 PROCEDURE — 3079F DIAST BP 80-89 MM HG: CPT | Mod: HCNC,CPTII,S$GLB, | Performed by: PHYSICIAN ASSISTANT

## 2020-12-10 PROCEDURE — 1125F AMNT PAIN NOTED PAIN PRSNT: CPT | Mod: HCNC,S$GLB,, | Performed by: PODIATRIST

## 2020-12-10 PROCEDURE — 3078F PR MOST RECENT DIASTOLIC BLOOD PRESSURE < 80 MM HG: ICD-10-PCS | Mod: HCNC,CPTII,S$GLB, | Performed by: PODIATRIST

## 2020-12-10 PROCEDURE — 3077F PR MOST RECENT SYSTOLIC BLOOD PRESSURE >= 140 MM HG: ICD-10-PCS | Mod: HCNC,CPTII,S$GLB, | Performed by: PHYSICIAN ASSISTANT

## 2020-12-10 PROCEDURE — 3077F SYST BP >= 140 MM HG: CPT | Mod: HCNC,CPTII,S$GLB, | Performed by: PHYSICIAN ASSISTANT

## 2020-12-10 PROCEDURE — 99999 PR PBB SHADOW E&M-EST. PATIENT-LVL V: ICD-10-PCS | Mod: PBBFAC,HCNC,, | Performed by: PODIATRIST

## 2020-12-10 PROCEDURE — 3008F BODY MASS INDEX DOCD: CPT | Mod: HCNC,CPTII,S$GLB, | Performed by: PODIATRIST

## 2020-12-10 PROCEDURE — 3079F PR MOST RECENT DIASTOLIC BLOOD PRESSURE 80-89 MM HG: ICD-10-PCS | Mod: HCNC,CPTII,S$GLB, | Performed by: PHYSICIAN ASSISTANT

## 2020-12-10 PROCEDURE — 99214 OFFICE O/P EST MOD 30 MIN: CPT | Mod: HCNC,S$GLB,, | Performed by: PHYSICIAN ASSISTANT

## 2020-12-10 PROCEDURE — 99999 PR PBB SHADOW E&M-EST. PATIENT-LVL V: CPT | Mod: PBBFAC,HCNC,, | Performed by: PODIATRIST

## 2020-12-10 PROCEDURE — 3077F SYST BP >= 140 MM HG: CPT | Mod: HCNC,CPTII,S$GLB, | Performed by: PODIATRIST

## 2020-12-10 PROCEDURE — 3078F DIAST BP <80 MM HG: CPT | Mod: HCNC,CPTII,S$GLB, | Performed by: PODIATRIST

## 2020-12-10 RX ORDER — DEXTROSE 4 G
TABLET,CHEWABLE ORAL
Qty: 1 EACH | Refills: 0 | Status: ON HOLD | OUTPATIENT
Start: 2020-12-10 | End: 2021-06-18 | Stop reason: HOSPADM

## 2020-12-10 RX ORDER — HYDROCODONE BITARTRATE AND ACETAMINOPHEN 10; 325 MG/1; MG/1
1 TABLET ORAL EVERY 8 HOURS PRN
Qty: 20 TABLET | Refills: 0 | Status: SHIPPED | OUTPATIENT
Start: 2020-12-10 | End: 2020-12-16 | Stop reason: SDUPTHER

## 2020-12-10 RX ORDER — LANCETS 33 GAUGE
EACH MISCELLANEOUS 4 TIMES DAILY
Qty: 300 EACH | Refills: 3 | Status: SHIPPED | OUTPATIENT
Start: 2020-12-10 | End: 2020-12-14 | Stop reason: SDUPTHER

## 2020-12-10 NOTE — TELEPHONE ENCOUNTER
----- Message from Silvio Rendon sent at 12/10/2020  9:38 AM CST -----  ..Type:  Patient Returning Call    Who Called pt   Who Left Message for Patient:   Does the patient know what this is regarding?: f/u apt   Would the patient rather a call back or a response via [facility]  call back   Best Call Back Number [phone]  Additional Information:  pt is requesting a call from nurse to discuss an appt sooner for a 2 week f/u

## 2020-12-10 NOTE — PROGRESS NOTES
Subjective:     Patient ID: Crow Green is a 65 y.o. male.    Chief Complaint: Wound Check (wears post op. rates pain 8/10. diabetic Pt. PCP Dr. Lambert.)    Crow is a 65 y.o. male who presents to the clinic for evaluation and treatment of high risk feet. Crow has a past medical history of Arthritis, Asthma, Atrial fibrillation, Atrial fibrillation with RVR (8/7/2019), CHF (congestive heart failure), Chronic obstructive pulmonary disease (8/5/2020), Depression, Dermatomyositis, Diabetes mellitus, type 2 (Diagnosed in 2000), Elevated PSA, Follow up (7/30/2020), Hypertension, and Sleep apnea. The patient's chief complaint left foot ulcer. Patient states he kept dressing dry, clean, and intact since last appointment. Patient states his pain has eased up in the day time and only takes the pain medication at night time. This patient has documented high risk feet requiring routine maintenance secondary to diabetes mellitis and those secondary complications of diabetes, as mentioned..    PCP: Mateo Lambert, DO    Date Last Seen by PCP: 11/24/2020    Current shoe gear:  Affected Foot: Surgical boot     Unaffected Foot: Tennis shoes    History of Trauma: negative  Sign of Infection: none    Hemoglobin A1C   Date Value Ref Range Status   10/01/2020 6.6 (H) 4.0 - 5.6 % Final     Comment:     ADA Screening Guidelines:  5.7-6.4%  Consistent with prediabetes  >or=6.5%  Consistent with diabetes  High levels of fetal hemoglobin interfere with the HbA1C  assay. Heterozygous hemoglobin variants (HbS, HgC, etc)do  not significantly interfere with this assay.   However, presence of multiple variants may affect accuracy.     05/19/2020 7.7 (H) 4.0 - 5.6 % Final     Comment:     ADA Screening Guidelines:  5.7-6.4%  Consistent with prediabetes  >or=6.5%  Consistent with diabetes  High levels of fetal hemoglobin interfere with the HbA1C  assay. Heterozygous hemoglobin variants (HbS, HgC, etc)do  not significantly interfere  with this assay.   However, presence of multiple variants may affect accuracy.     12/13/2019 7.5 (H) 4.0 - 5.6 % Final     Comment:     ADA Screening Guidelines:  5.7-6.4%  Consistent with prediabetes  >or=6.5%  Consistent with diabetes  High levels of fetal hemoglobin interfere with the HbA1C  assay. Heterozygous hemoglobin variants (HbS, HgC, etc)do  not significantly interfere with this assay.   However, presence of multiple variants may affect accuracy.         Patient Active Problem List   Diagnosis    ED (erectile dysfunction)    Non-proliferative diabetic retinopathy, mild, both eyes    Hypertension associated with diabetes    Diabetic polyneuropathy    Nonobstructive coronary artery disease of native artery of native heart    Chronic combined systolic and diastolic HFrEF of 45%    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, with long-term current use of insulin    SANDRITA on CPAP    Moderate asthma    Psychophysiological insomnia    Nightmares    Sleep related gastroesophageal reflux disease    Inadequate sleep hygiene    PAF (paroxysmal atrial fibrillation)    At risk for medication noncompliance    Obesity (BMI 30.0-34.9)    Indeterminate stage chronic open angle glaucoma    Right hip pain    Gait instability    Chronic right shoulder pain    Arthritis    Left sided sciatica    Dyspnea on exertion    Osteoarthritis of spine with radiculopathy, lumbar region    HNP (herniated nucleus pulposus), lumbar    Gastroesophageal reflux disease without esophagitis    Chronic diastolic heart failure    Hypogonadism in male    Disorder of parathyroid gland    Hyperlipidemia associated with type 2 diabetes mellitus    JOSE MARIA (acute kidney injury)    LRTI (lower respiratory tract infection)    Physical deconditioning    Acute pain of left shoulder    Traumatic tear of left rotator cuff    Atrial fibrillation with RVR    Other chronic  pain    Left shoulder pain    Complete tear of left rotator cuff    Stage 3 chronic kidney disease    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 associated with type 2 diabetes mellitus    Lumbar radiculopathy    Diabetic ulcer of toe of left foot associated with type 2 diabetes mellitus, with necrosis of muscle    Ulcer of left foot    Elevated troponin    Hypotension due to medication    Dermatomyositis    Postprocedural hypotension    Advanced directives, counseling/discussion    Cocaine dependence, in remission    Schizoaffective disorder, depressive type    Chronic obstructive pulmonary disease    Severe episode of recurrent major depressive disorder, without psychotic features    DOMINIK (generalized anxiety disorder)    Bilateral carpal tunnel syndrome       Medication List with Changes/Refills   Current Medications    ALCOHOL SWABS (ALCOHOL PREP PADS) PADM    Apply 1 each topically once daily.    ALLOPURINOL (ZYLOPRIM) 100 MG TABLET    Take 1 tablet (100 mg total) by mouth once daily.    ASPIRIN (ECOTRIN) 81 MG EC TABLET    Take 1 tablet (81 mg total) by mouth once daily.    ATORVASTATIN (LIPITOR) 40 MG TABLET    TAKE 1 TABLET EVERY EVENING    AZATHIOPRINE (IMURAN) 50 MG TAB    Take 1 tablet (50 mg total) by mouth once daily.    BACLOFEN (LIORESAL) 10 MG TABLET    Take 1 tablet (10 mg total) by mouth once daily.    BLOOD SUGAR DIAGNOSTIC (TRUE METRIX GLUCOSE TEST STRIP) STRP    Use with blood glucose meter kit to test blood sugar four times  daily    BLOOD SUGAR DIAGNOSTIC STRP    1 each by Misc.(Non-Drug; Combo Route) route 4 (four) times daily.    BLOOD-GLUCOSE METER (TRUE METRIX AIR GLUCOSE METER) KIT    TEST FOUR TIMES DAILY BEFORE MEALS  AND EVERY NIGHT    BLOOD-GLUCOSE METER MISC    Use as directed    COLCRYS 0.6 MG TABLET    TAKE 1 TABLET EVERY DAY    DICLOFENAC SODIUM (VOLTAREN) 1 % GEL    Apply 2 g topically 4 (four) times daily.    DOXYCYCLINE  (VIBRAMYCIN) 100 MG CAP    Take 1 capsule (100 mg total) by mouth 2 (two) times daily.    ELIQUIS 5 MG TAB    TAKE 1 TABLET TWICE DAILY    FLUOCINONIDE (LIDEX) 0.05 % EXTERNAL SOLUTION    Apply topically 2 (two) times daily. Use on scalp.    FLUTICASONE-SALMETEROL DISKUS INHALER 250-50 MCG    Inhale 1 puff into the lungs 2 (two) times daily. Controller    FOOD SUPPLEMT, LACTOSE-REDUCED (ENSURE) LIQD    Take 118 mLs by mouth 3 (three) times daily with meals.    FUROSEMIDE (LASIX) 40 MG TABLET    TAKE 2 TABLETS EVERY MORNING  AND TAKE 1 TABLET EVERY EVENING    GABAPENTIN (NEURONTIN) 800 MG TABLET    Take 1 tablet (800 mg total) by mouth 3 (three) times daily.    GLIMEPIRIDE (AMARYL) 2 MG TABLET    TAKE 1 TABLET (2 MG TOTAL) BY MOUTH BEFORE BREAKFAST.    INHALATION SPACING DEVICE    Use as directed for inhalation.    INSULIN (LANTUS SOLOSTAR U-100 INSULIN) GLARGINE 100 UNITS/ML (3ML) SUBQ PEN    INJECT 35 UNITS SUBCUTANEOUSLY EVERY EVENING    INSULIN ASPART U-100 (NOVOLOG FLEXPEN U-100 INSULIN) 100 UNIT/ML (3 ML) INPN PEN    INJECT 10 UNITS SUBCUTANEOUSLY THREE TIMES DAILY WITH MEALS    INSULIN SYRINGE-NEEDLE U-100 1 ML 31 GAUGE X 5/16 SYRG        IPRATROPIUM (ATROVENT) 0.02 % NEBULIZER SOLUTION    USE 1 VIAL VIA NEBULIZER FOUR TIMES DAILY    ISOSORBIDE MONONITRATE (IMDUR) 60 MG 24 HR TABLET    Take 1 tablet (60 mg total) by mouth once daily.    LANCETS 32 GAUGE MISC    1 lancet by Misc.(Non-Drug; Combo Route) route 2 (two) times daily.    LANCETS 33 GAUGE MISC    by Misc.(Non-Drug; Combo Route) route 4 (four) times daily.    LATANOPROST 0.005 % OPHTHALMIC SOLUTION    Place 1 drop into both eyes every evening.    LISINOPRIL 10 MG TABLET        MAGNESIUM OXIDE (MAG-OX) 400 MG (241.3 MG MAGNESIUM) TABLET    Take 1 tablet (400 mg total) by mouth once daily.    METFORMIN (GLUCOPHAGE-XR) 500 MG ER 24HR TABLET    Take 2 tablets (1,000 mg total) by mouth 2 (two) times daily with meals.    METOPROLOL SUCCINATE (TOPROL-XL) 50  "MG 24 HR TABLET    Take 1 tablet (50 mg total) by mouth once daily.    NITROGLYCERIN (NITROSTAT) 0.3 MG SL TABLET    PLACE 1 TABLET UNDER THE TONGUE EVERY 5 MINUTES AS NEEDED FOR CHEST PAIN, NO MORE THAN 3 TABLETS IN ONE DAY    OZEMPIC 1 MG/DOSE (2 MG/1.5 ML) PNIJ    Inject 0.75 mLs into the skin once a week.    PANTOPRAZOLE (PROTONIX) 40 MG TABLET    Take 1 tablet (40 mg total) by mouth once daily.    PEN NEEDLE, DIABETIC (BD ULTRA-FINE SHORT PEN NEEDLE) 31 GAUGE X 5/16" NDLE    Inject 1 each into the skin 3 (three) times daily.    PREDNISONE (DELTASONE) 5 MG TABLET    Take 1 tablet (5 mg total) by mouth 2 (two) times daily.    SERTRALINE (ZOLOFT) 100 MG TABLET    Take 1 tablet (100 mg total) by mouth every evening.    SILVER SULFADIAZINE 1% (SILVADENE) 1 % CREAM    Apply topically 2 (two) times daily.    TAMSULOSIN (FLOMAX) 0.4 MG CAP    Take 1 capsule (0.4 mg total) by mouth once daily.    TIOTROPIUM (SPIRIVA WITH HANDIHALER) 18 MCG INHALATION CAPSULE    Inhale 1 capsule (18 mcg total) into the lungs once daily. Controller    TRAZODONE (DESYREL) 100 MG TABLET    Take 2 tablets (200 mg total) by mouth every evening.    TRIAMCINOLONE ACETONIDE 0.1% (KENALOG) 0.1 % OINTMENT    Apply topically 2 (two) times daily. Use on legs and back    VIOS AEROSOL DELIVERY SYSTEM VIN    USE AS DIRESTED   Changed and/or Refilled Medications    Modified Medication Previous Medication    HYDROCODONE-ACETAMINOPHEN (NORCO)  MG PER TABLET HYDROcodone-acetaminophen (NORCO)  mg per tablet       Take 1 tablet by mouth every 8 (eight) hours as needed for Pain.    Take 1 tablet by mouth every 8 (eight) hours as needed for Pain.       Review of patient's allergies indicates:   Allergen Reactions    Protamine Hives     Urticaria, possible upper airway swelling       Past Surgical History:   Procedure Laterality Date    ABLATION OF ARRHYTHMOGENIC FOCUS FOR ATRIAL FIBRILLATION N/A 2/27/2020    Procedure: Ablation atrial " fibrillation;  Surgeon: Gio Brown MD;  Location[facility] EP LAB;  Service: Cardiology;  Laterality: N/A;  afib, DAMIEN (cx if SR), PVI, PITO, anes, MB, 3 Prep    CHOLECYSTECTOMY      CLOSURE OF WOUND Left 7/1/2020    Procedure: CLOSURE, WOUND;  Surgeon: Barb Veras DPM;  Location: [facility];  Service: Podiatry;  Laterality: Left;    SELECTIVE INJECTION OF ANESTHETIC AGENT AROUND LUMBAR SPINAL NERVE ROOT BY TRANSFORAMINAL APPROACH Left 6/11/2020    Procedure: BLOCK, SPINAL NERVE ROOT, LUMBAR, SELECTIVE, TRANSFORAMINAL APPROACH Left L4-5, L5-S1 TFESI with RN IV sedation;  Surgeon: Dillan Tsai MD;  Location: [facility] PAIN MGT;  Service: Pain Management;  Laterality: Left;    TOE AMPUTATION Left 6/29/2020    Procedure: AMPUTATION, TOE;  Surgeon: Barb Veras DPM;  Location: [facility];  Service: Podiatry;  Laterality: Left;  great toe       Family History   Problem Relation Age of Onset    Glaucoma Mother     Cataracts Mother     Cataracts Father     Glaucoma Sister     Cataracts Sister     Glaucoma Maternal Aunt     No Known Problems Brother     No Known Problems Daughter     No Known Problems Son     Prostate cancer Neg Hx        Social History     Socioeconomic History    Marital status: Legally      Spouse name: Not on file    Number of children: 5    Years of education: Not on file    Highest education level: Not on file   Occupational History    Occupation: disabled   Social Needs    Financial resource strain: Very hard    Food insecurity     Worry: Often true     Inability: Often true    Transportation needs     Medical: Yes     Non-medical: Yes   Tobacco Use    Smoking status: Never Smoker    Smokeless tobacco: Never Used   Substance and Sexual Activity    Alcohol use: Not Currently    Drug use: No    Sexual activity: Yes     Partners: Female   Lifestyle    Physical activity     Days per week: 0 days     Minutes per session: 0 min    Stress: Rather much   Relationships  "   Social connections     Talks on phone: Three times a week     Gets together: Never     Attends Christianity service: More than 4 times per year     Active member of club or organization: No     Attends meetings of clubs or organizations: Never     Relationship status:    Other Topics Concern    Not on file   Social History Narrative    Utilizing [employer] transportation which has been unreliable.        Vitals:    12/10/20 1149   BP: (!) 173/95   Pulse: 81   Weight: 84.8 kg (187 lb)   Height: 5' 5" (1.651 m)   PainSc:   8   PainLoc: Foot       Hemoglobin A1C   Date Value Ref Range Status   10/01/2020 6.6 (H) 4.0 - 5.6 % Final     Comment:     ADA Screening Guidelines:  5.7-6.4%  Consistent with prediabetes  >or=6.5%  Consistent with diabetes  High levels of fetal hemoglobin interfere with the HbA1C  assay. Heterozygous hemoglobin variants (HbS, HgC, etc)do  not significantly interfere with this assay.   However, presence of multiple variants may affect accuracy.     05/19/2020 7.7 (H) 4.0 - 5.6 % Final     Comment:     ADA Screening Guidelines:  5.7-6.4%  Consistent with prediabetes  >or=6.5%  Consistent with diabetes  High levels of fetal hemoglobin interfere with the HbA1C  assay. Heterozygous hemoglobin variants (HbS, HgC, etc)do  not significantly interfere with this assay.   However, presence of multiple variants may affect accuracy.     12/13/2019 7.5 (H) 4.0 - 5.6 % Final     Comment:     ADA Screening Guidelines:  5.7-6.4%  Consistent with prediabetes  >or=6.5%  Consistent with diabetes  High levels of fetal hemoglobin interfere with the HbA1C  assay. Heterozygous hemoglobin variants (HbS, HgC, etc)do  not significantly interfere with this assay.   However, presence of multiple variants may affect accuracy.         Review of Systems   Constitutional: Negative for chills and fever.   Respiratory: Negative for shortness of breath.    Cardiovascular: Negative for chest pain, palpitations, orthopnea, " claudication and leg swelling.   Gastrointestinal: Negative for diarrhea, nausea and vomiting.   Musculoskeletal: Negative for joint pain.   Skin: Negative for rash.   Neurological: Positive for tingling and sensory change.   Psychiatric/Behavioral: Negative.            Objective:      PHYSICAL EXAM: Apperance: Alert and orient in no distress,well developed, and with good attention to grooming and body habits  Patient presents ambulating in surgical shoe and dressing to left foot and tennis shoe on the right.   Lower Extremity Exam  VASCULAR: Dorsalis pedis pulses 2/4 left and Posterior Tibial pulses 2/4 left. Capillary fill time <4 seconds left. No edema observed left. Varicosities present left. Skin temperature of the lower extremities is warm to warm, proximal to distal. Hair growth dim left. (--) lymphangitis or (--) cellulitis noted left.  DERMATOLOGICAL: (--) edema, (--) erythema, (--) malodor, (--) drainage, (--) warmth to left foot.  Previous ulcer noted to left medial 1st metatarsal head and distal hallux amputation closed with thin epithealized tissue. The dorsum surface of the feet are soft and supple.  The plantar aspects of feet are dry and scaly. Webspaces 2,3,4 clean, dry and without evidence of break in skin integrity left.   NEUROLOGICAL: Light touch, sharp-dull, proprioception all present and equal bilaterally. Protective sensation absent sites as tested with a [place]-Kimmie 5.07 monofilament.   MUSCULOSKELETAL: Muscle strength is 5/5 for foot inverters, everters, plantarflexors, and dorsiflexors. Muscle tone is normal. Left hallux amputation noted.           Assessment:       Encounter Diagnoses   Name Primary?    Healed ulcer of left foot on examination Yes    Dehiscence of amputation stump - Left Foot     Acute post-operative pain - Left Foot     Ulcer of left foot, with fat layer exposed - Left Foot          Plan:   Healed ulcer of left foot on examination    Dehiscence of amputation  stump - Left Foot  -     HYDROcodone-acetaminophen (NORCO)  mg per tablet; Take 1 tablet by mouth every 8 (eight) hours as needed for Pain.  Dispense: 20 tablet; Refill: 0    Acute post-operative pain - Left Foot  -     HYDROcodone-acetaminophen (NORCO)  mg per tablet; Take 1 tablet by mouth every 8 (eight) hours as needed for Pain.  Dispense: 20 tablet; Refill: 0    Ulcer of left foot, with fat layer exposed - Left Foot  -     HYDROcodone-acetaminophen (NORCO)  mg per tablet; Take 1 tablet by mouth every 8 (eight) hours as needed for Pain.  Dispense: 20 tablet; Refill: 0      I counseled the patient on his conditions, regarding findings of my examination, my impressions, and usual treatment plan.   Wound has healed well with no signs of continued skin breakdown noted  Ok to convert to normal shoe gear   Refill completed for Norco 10-325mg to be taken as needed for pain. Patient advised on the possible elevation of blood pressure or heart effects and caution to take pills as needed and to discontinue use if symptoms arise, patient agreed.   Patient instructed to bring left diabetic shoe with him next week.   Patient should call the clinic immediately if any signs of infection such as fever chills sweats increased redness or pain.  Patient to return in 3 weeka or sooner if needed.            Barb Veras DPM  [facility]

## 2020-12-11 ENCOUNTER — PATIENT MESSAGE (OUTPATIENT)
Dept: OTHER | Facility: OTHER | Age: 65
End: 2020-12-11

## 2020-12-14 DIAGNOSIS — Z79.4 TYPE 2 DIABETES MELLITUS WITH MILD NONPROLIFERATIVE RETINOPATHY, WITH LONG-TERM CURRENT USE OF INSULIN, MACULAR EDEMA PRESENCE UNSPECIFIED, UNSPECIFIED LATERALITY: ICD-10-CM

## 2020-12-14 DIAGNOSIS — M10.9 GOUT, UNSPECIFIED CAUSE, UNSPECIFIED CHRONICITY, UNSPECIFIED SITE: ICD-10-CM

## 2020-12-14 DIAGNOSIS — E11.3299 TYPE 2 DIABETES MELLITUS WITH MILD NONPROLIFERATIVE RETINOPATHY, WITH LONG-TERM CURRENT USE OF INSULIN, MACULAR EDEMA PRESENCE UNSPECIFIED, UNSPECIFIED LATERALITY: ICD-10-CM

## 2020-12-14 DIAGNOSIS — E11.65 UNCONTROLLED TYPE 2 DIABETES MELLITUS WITH HYPERGLYCEMIA: ICD-10-CM

## 2020-12-14 RX ORDER — PEN NEEDLE, DIABETIC 30 GX3/16"
1 NEEDLE, DISPOSABLE MISCELLANEOUS 3 TIMES DAILY
Qty: 100 EACH | Refills: 11 | Status: SHIPPED | OUTPATIENT
Start: 2020-12-14 | End: 2021-08-03

## 2020-12-14 RX ORDER — LANCETS 33 GAUGE
1 EACH MISCELLANEOUS 4 TIMES DAILY
Qty: 300 EACH | Refills: 3 | Status: ON HOLD | OUTPATIENT
Start: 2020-12-14 | End: 2021-06-18 | Stop reason: HOSPADM

## 2020-12-14 NOTE — TELEPHONE ENCOUNTER
----- Message from Fouzia Timmons sent at 12/14/2020  1:50 PM CST -----  Contact: self/[phone]  Patient states he need a refill on diabetic supplies, and need to be sent to   [facility] - [place], [place] - [address]   [address]  [facility] [address]  Phone: [phone] Fax: [phone]  Please call back at [phone]. Thanks/ar

## 2020-12-15 ENCOUNTER — CLINICAL SUPPORT (OUTPATIENT)
Dept: INTERNAL MEDICINE | Facility: CLINIC | Age: 65
End: 2020-12-15
Payer: MEDICARE

## 2020-12-15 PROCEDURE — 99999 PR PBB SHADOW E&M-EST. PATIENT-LVL III: ICD-10-PCS | Mod: PBBFAC,HCNC,,

## 2020-12-15 PROCEDURE — 99999 PR PBB SHADOW E&M-EST. PATIENT-LVL III: CPT | Mod: PBBFAC,HCNC,,

## 2020-12-15 RX ORDER — METOPROLOL SUCCINATE 50 MG/1
50 TABLET, EXTENDED RELEASE ORAL DAILY
Qty: 90 TABLET | Refills: 3 | Status: SHIPPED | OUTPATIENT
Start: 2020-12-15 | End: 2021-03-23 | Stop reason: SDUPTHER

## 2020-12-15 RX ORDER — FUROSEMIDE 40 MG/1
TABLET ORAL
Qty: 270 TABLET | Refills: 0 | Status: SHIPPED | OUTPATIENT
Start: 2020-12-15 | End: 2021-03-23 | Stop reason: SDUPTHER

## 2020-12-15 RX ORDER — ALLOPURINOL 100 MG/1
100 TABLET ORAL DAILY
Qty: 30 TABLET | Refills: 2 | Status: SHIPPED | OUTPATIENT
Start: 2020-12-15 | End: 2021-02-25

## 2020-12-15 RX ORDER — COLCHICINE 0.6 MG/1
0.6 TABLET ORAL DAILY
Qty: 90 TABLET | Refills: 2 | Status: SHIPPED | OUTPATIENT
Start: 2020-12-15 | End: 2021-03-23 | Stop reason: SDUPTHER

## 2020-12-15 NOTE — PROGRESS NOTES
Patient came in office today to complete a medication review. All medications reviewed and 2 week of medication case filled. Scheduled pt 2 week nurse visit for medication review and medication case filled

## 2020-12-16 ENCOUNTER — TELEPHONE (OUTPATIENT)
Dept: PODIATRY | Facility: CLINIC | Age: 65
End: 2020-12-16

## 2020-12-16 DIAGNOSIS — L97.522 ULCER OF LEFT FOOT, WITH FAT LAYER EXPOSED: ICD-10-CM

## 2020-12-16 DIAGNOSIS — Z89.412 STATUS POST AMPUTATION OF LEFT GREAT TOE: Primary | ICD-10-CM

## 2020-12-16 DIAGNOSIS — G89.18 ACUTE POST-OPERATIVE PAIN: ICD-10-CM

## 2020-12-16 DIAGNOSIS — T87.81 DEHISCENCE OF AMPUTATION STUMP: ICD-10-CM

## 2020-12-16 RX ORDER — HYDROCODONE BITARTRATE AND ACETAMINOPHEN 10; 325 MG/1; MG/1
1 TABLET ORAL EVERY 8 HOURS PRN
Qty: 20 TABLET | Refills: 0 | Status: SHIPPED | OUTPATIENT
Start: 2020-12-17 | End: 2020-12-22 | Stop reason: SDUPTHER

## 2020-12-16 NOTE — TELEPHONE ENCOUNTER
Spoke with patient and informed him Dr. Veras sent a prescription to [facility] in [place] and will be available for pickup on 12/17/2020. Patient acknowledges understanding. Call ended pleasantly.

## 2020-12-18 ENCOUNTER — TELEPHONE (OUTPATIENT)
Dept: PRIMARY CARE CLINIC | Facility: CLINIC | Age: 65
End: 2020-12-18

## 2020-12-18 NOTE — PROGRESS NOTES
CHW attempted to contact Mr. Green to schedule the next appointment with Awilda Nogueira LCSW.  CHW left a voicemail message requesting a call back.

## 2020-12-21 ENCOUNTER — OUTPATIENT CASE MANAGEMENT (OUTPATIENT)
Dept: ADMINISTRATIVE | Facility: OTHER | Age: 65
End: 2020-12-21

## 2020-12-22 DIAGNOSIS — Z89.412 STATUS POST AMPUTATION OF LEFT GREAT TOE: ICD-10-CM

## 2020-12-22 DIAGNOSIS — G89.18 ACUTE POST-OPERATIVE PAIN: ICD-10-CM

## 2020-12-22 RX ORDER — HYDROCODONE BITARTRATE AND ACETAMINOPHEN 10; 325 MG/1; MG/1
1 TABLET ORAL EVERY 8 HOURS PRN
Qty: 20 TABLET | Refills: 0 | Status: SHIPPED | OUTPATIENT
Start: 2020-12-22 | End: 2020-12-30 | Stop reason: SDUPTHER

## 2020-12-23 ENCOUNTER — OUTPATIENT CASE MANAGEMENT (OUTPATIENT)
Dept: ADMINISTRATIVE | Facility: OTHER | Age: 65
End: 2020-12-23

## 2020-12-24 NOTE — PROGRESS NOTES
Outpatient Care Management   - High Risk Patient Assessment    Patient: Crow Green  MRN:  [id number]  Date of Service:  12/24/2020  Completed by:  Jayla Walsh LCSW  Referral Date: 11/24/2020  Program: Disease Management (Diabetes & COPD)    Reason for Visit   Patient presents with    OPCM Chart Review     12/21/2020    OPCM SW First Assessment Attempt     12/21/2020    Social Work Assessment - High Risk     12/24/2020    Plan Of Care     12/24/2020       Brief Summary:  received a referral from OPCM MAYNOR Marrero for the following patient identified psycho-social needs: PHQ=19, transportation, food. Pt is enrolled in the [facility] program and expresses a desire to continue. Discussed the importance of mental health treatment. [facility] LCSW Awilda Nogueira has attempted contact pt to schedule appt. Pt states he erased all messages from his phone and doesn't have phone number to re-schedule. This LCSW will send message to Awilda and JOLIE Nagy requesting they reach out to pt again. Pt declines the need for other mental health resources. Pt has [employer] transportation benefit; [facility] transportation service recently did not pick him up for appt and didn't call to let him know so he missed appt. Pt states he is registered to use [employer] transportation, however their service times are very limited. Discussed possible option for [employer] paratransit and need to complete application. Pt declines the need for food resources saying he received $209/month SNAP and $75 monthly food card from [employer]. Pt requests assistance with utility bill. Informed pt resources may have strict guidelines/limited resources due to COVID.   Reviewed [employer] benefits, most of which pt is aware. Pt is interested in getting the personal emergency response system which is a free [employer] benefit. Provided pt with contact info for [employer]. By next encounter, patient agrees to call [employer]. Next steps: Per pt request,  LCSW will mail: [employer] application and information about [employer]. Collaborating with OPCM RN. Message to RASHIDA Nogueira.    Patient Summary     OPCM Social Work Assessment (High Risk)    Involvement of Care  Do I have permission to speak with other family members about your care?: No  Assessment completed by: Patient  Cognitive  Which of the following can you state?: Name, Date of birth, Address, Year, President  Cognitive barriers?: No  General  Marital status:   Current employment status: Disabled  Support  Level of Caregiver support: Member independent and does not need caregiver assistance (Comment: Lives with father; ambulates with cane when needed and uses wheelchair outside of the home. Independent with ADL's, relies on others for AIDL's (uses [employer] transportation benefit to medical appts))  Support system: Family, [place] based (Comment: sister)  Is the caregiver reporting burnout?: No  Support Barriers?: Yes (Comment: wants other transportation options)  Advanced Care Planning  Do you have any of the following?: None  If yes, do we have a copy?: N/A  If no, would you like Advance Directive resources?: No  Advance Care Planning resources provided?: No  Is Advance Care Planning an area of need?: Yes  Financial  Current medical coverage: SNP, Medicaid  Have you applied for government assistance programs?: Yes (Comment: SNAP $204/month, Medicaid)  Are you unable to pay any of the following?: Utilities (Comment: utility bill is in father's name)  Gross monthly income: 902  Other assets: none  Financial Support Barriers?: Yes  Safety  Significant change in functioning?: Disability benefits  Safety barriers?: No   History  Do you or your spouse currently or formerly serve in the ?: No  Disaster Plan  Established evacuation plan?: N/A  [place] residence: [facility]  Evacuation location: n/a  Mental Health Status  Emotional status: Telephonic/Unable to assess  Have you recenetly lost a  loved one?: Yes (Comment: 1st wife  1 month ago)  Psychiatric diagnosis: Zoloft, Trazodone  Current mental health treatment: Yes (Comment: Enrolled in [facility] program-Awilda Nogueira LCSW hasn't been able to reach pt to scheduled appt)  Would you like mental health resources?: No (Comment: voices desire to continue treatment with [facility])  Current symptoms: Sleep disturbances, Restlessness, Memory problems, Confusion, Hallucinations, Suicidal, Other (Use comment) (Comment: reports visual hallucinations (shadows), reports SI-no intent or plan to harm self, denies previous suicide attempts, states he gets easily agitated)  Mental/Behavioral/Environmental risk: None  Mental Health Barriers?: Yes  Addictive Behaviors  Current alcohol consumption?: No  Current substance abuse?: No (Comment: 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    Patient Instructions     Follow up in about 9 days (around 2020) for call with .    [facility] OPCM Self-Management Care Plan was developed with the patients/caregivers input and was based on identified barriers from todays assessment.  Goals were written today with the patient/caregiver and the patient has agreed to work towards these goals to improve his/her overall well-being. Patient verbalized understanding of the care plan, goals, and all of today's instructions. Encouraged patient/caregiver to communicate with his/her physician and health care team about health conditions and the treatment plan.  Provided my contact information today and encouraged patient/caregiver to call me with any questions as needed.

## 2020-12-28 ENCOUNTER — PATIENT OUTREACH (OUTPATIENT)
Dept: ADMINISTRATIVE | Facility: OTHER | Age: 65
End: 2020-12-28

## 2020-12-28 ENCOUNTER — TELEPHONE (OUTPATIENT)
Dept: PRIMARY CARE CLINIC | Facility: CLINIC | Age: 65
End: 2020-12-28

## 2020-12-28 NOTE — PROGRESS NOTES
"CHW contacted Mr. Green to update the assessments and schedule the next appointment with Awilda Nogueira LCSW. Patient scored "22" on the PHQ-9 and "19" on the DOMINIK-7. Patient has passive thoughts about suicide, but clearly stated he has no plans to commit suicide. Patient stated with all the pain sometimes he thinks of being better off dead, but he is not going to hurt his self. CH[facility], patient replied, no I can wait until my appointment time. The appointment is 01- at 10:00 am.    "

## 2020-12-28 NOTE — PROGRESS NOTES
Health Maintenance Due   Topic Date Due    Urine Drug Screen  06/20/1973     Updates were requested from care everywhere.  Chart was reviewed for overdue Proactive [facility] Encounters (REJI) topics (CRS, Breast Cancer Screening, Eye exam)  Health Maintenance has been updated.

## 2020-12-29 ENCOUNTER — TELEPHONE (OUTPATIENT)
Dept: PODIATRY | Facility: CLINIC | Age: 65
End: 2020-12-29

## 2020-12-29 NOTE — TELEPHONE ENCOUNTER
Spoke with pt I informed him that the  And her staff is gone for today but I will send the message to her staf fand they will give him a call back tomorrow.          Shawna Wall MA  Podiatry Surgical Department  [facility]                    ----- Message from Fawn Joens sent at 12/29/2020  4:17 PM CST -----  Regarding: meds status  Good afternoon,      Pt called to check status of request of medication and would like a call back at [phone]      Thanks,  Fawn Jones

## 2020-12-30 ENCOUNTER — OUTPATIENT CASE MANAGEMENT (OUTPATIENT)
Dept: ADMINISTRATIVE | Facility: OTHER | Age: 65
End: 2020-12-30

## 2020-12-30 DIAGNOSIS — G89.18 ACUTE POST-OPERATIVE PAIN: ICD-10-CM

## 2020-12-30 DIAGNOSIS — Z89.412 STATUS POST AMPUTATION OF LEFT GREAT TOE: ICD-10-CM

## 2020-12-30 RX ORDER — HYDROCODONE BITARTRATE AND ACETAMINOPHEN 10; 325 MG/1; MG/1
1 TABLET ORAL EVERY 8 HOURS PRN
Qty: 20 TABLET | Refills: 0 | Status: SHIPPED | OUTPATIENT
Start: 2020-12-30 | End: 2021-01-04 | Stop reason: SDUPTHER

## 2020-12-31 ENCOUNTER — TELEPHONE (OUTPATIENT)
Dept: ADMINISTRATIVE | Facility: HOSPITAL | Age: 65
End: 2020-12-31

## 2021-01-04 ENCOUNTER — OFFICE VISIT (OUTPATIENT)
Dept: PODIATRY | Facility: CLINIC | Age: 66
End: 2021-01-04
Payer: MEDICARE

## 2021-01-04 VITALS
SYSTOLIC BLOOD PRESSURE: 111 MMHG | HEIGHT: 65 IN | DIASTOLIC BLOOD PRESSURE: 74 MMHG | WEIGHT: 186.94 LBS | HEART RATE: 89 BPM | BODY MASS INDEX: 31.14 KG/M2

## 2021-01-04 DIAGNOSIS — L84 PRE-ULCERATIVE CALLUSES: ICD-10-CM

## 2021-01-04 DIAGNOSIS — R52 PHANTOM PAIN: Primary | ICD-10-CM

## 2021-01-04 DIAGNOSIS — E11.49 TYPE II DIABETES MELLITUS WITH NEUROLOGICAL MANIFESTATIONS: ICD-10-CM

## 2021-01-04 DIAGNOSIS — G54.8 PHANTOM PAIN: Primary | ICD-10-CM

## 2021-01-04 PROCEDURE — 1125F AMNT PAIN NOTED PAIN PRSNT: CPT | Mod: HCNC,S$GLB,, | Performed by: PODIATRIST

## 2021-01-04 PROCEDURE — 99212 PR OFFICE/OUTPT VISIT, EST, LEVL II, 10-19 MIN: ICD-10-PCS | Mod: HCNC,S$GLB,, | Performed by: PODIATRIST

## 2021-01-04 PROCEDURE — 3074F PR MOST RECENT SYSTOLIC BLOOD PRESSURE < 130 MM HG: ICD-10-PCS | Mod: HCNC,CPTII,S$GLB, | Performed by: PODIATRIST

## 2021-01-04 PROCEDURE — 3074F SYST BP LT 130 MM HG: CPT | Mod: HCNC,CPTII,S$GLB, | Performed by: PODIATRIST

## 2021-01-04 PROCEDURE — 3044F PR MOST RECENT HEMOGLOBIN A1C LEVEL <7.0%: ICD-10-PCS | Mod: HCNC,CPTII,S$GLB, | Performed by: PODIATRIST

## 2021-01-04 PROCEDURE — 99499 UNLISTED E&M SERVICE: CPT | Mod: S$GLB,,, | Performed by: PODIATRIST

## 2021-01-04 PROCEDURE — 3044F HG A1C LEVEL LT 7.0%: CPT | Mod: HCNC,CPTII,S$GLB, | Performed by: PODIATRIST

## 2021-01-04 PROCEDURE — 3008F PR BODY MASS INDEX (BMI) DOCUMENTED: ICD-10-PCS | Mod: HCNC,CPTII,S$GLB, | Performed by: PODIATRIST

## 2021-01-04 PROCEDURE — 1125F PR PAIN SEVERITY QUANTIFIED, PAIN PRESENT: ICD-10-PCS | Mod: HCNC,S$GLB,, | Performed by: PODIATRIST

## 2021-01-04 PROCEDURE — 99999 PR PBB SHADOW E&M-EST. PATIENT-LVL V: CPT | Mod: PBBFAC,HCNC,, | Performed by: PODIATRIST

## 2021-01-04 PROCEDURE — 3008F BODY MASS INDEX DOCD: CPT | Mod: HCNC,CPTII,S$GLB, | Performed by: PODIATRIST

## 2021-01-04 PROCEDURE — 99999 PR PBB SHADOW E&M-EST. PATIENT-LVL V: ICD-10-PCS | Mod: PBBFAC,HCNC,, | Performed by: PODIATRIST

## 2021-01-04 PROCEDURE — 3078F PR MOST RECENT DIASTOLIC BLOOD PRESSURE < 80 MM HG: ICD-10-PCS | Mod: HCNC,CPTII,S$GLB, | Performed by: PODIATRIST

## 2021-01-04 PROCEDURE — 99499 RISK ADDL DX/OHS AUDIT: ICD-10-PCS | Mod: S$GLB,,, | Performed by: PODIATRIST

## 2021-01-04 PROCEDURE — 99212 OFFICE O/P EST SF 10 MIN: CPT | Mod: HCNC,S$GLB,, | Performed by: PODIATRIST

## 2021-01-04 PROCEDURE — 3078F DIAST BP <80 MM HG: CPT | Mod: HCNC,CPTII,S$GLB, | Performed by: PODIATRIST

## 2021-01-04 RX ORDER — HYDROCODONE BITARTRATE AND ACETAMINOPHEN 10; 325 MG/1; MG/1
1 TABLET ORAL EVERY 12 HOURS PRN
Qty: 20 TABLET | Refills: 0 | Status: SHIPPED | OUTPATIENT
Start: 2021-01-05 | End: 2021-01-15 | Stop reason: SDUPTHER

## 2021-01-05 ENCOUNTER — TELEPHONE (OUTPATIENT)
Dept: PRIMARY CARE CLINIC | Facility: CLINIC | Age: 66
End: 2021-01-05

## 2021-01-06 ENCOUNTER — CLINICAL SUPPORT (OUTPATIENT)
Dept: PRIMARY CARE CLINIC | Facility: CLINIC | Age: 66
End: 2021-01-06
Payer: MEDICARE

## 2021-01-06 ENCOUNTER — TELEPHONE (OUTPATIENT)
Dept: PRIMARY CARE CLINIC | Facility: CLINIC | Age: 66
End: 2021-01-06

## 2021-01-06 DIAGNOSIS — F41.1 GAD (GENERALIZED ANXIETY DISORDER): ICD-10-CM

## 2021-01-06 DIAGNOSIS — F33.2 SEVERE EPISODE OF RECURRENT MAJOR DEPRESSIVE DISORDER, WITHOUT PSYCHOTIC FEATURES: Primary | ICD-10-CM

## 2021-01-06 DIAGNOSIS — G89.29 OTHER CHRONIC PAIN: ICD-10-CM

## 2021-01-06 DIAGNOSIS — M25.512 CHRONIC LEFT SHOULDER PAIN: ICD-10-CM

## 2021-01-06 DIAGNOSIS — G89.29 CHRONIC LEFT SHOULDER PAIN: ICD-10-CM

## 2021-01-06 PROCEDURE — 90834 PSYTX W PT 45 MINUTES: CPT | Mod: BHI,95,, | Performed by: SOCIAL WORKER

## 2021-01-06 PROCEDURE — 90834 PR PSYCHOTHERAPY W/PATIENT, 45 MIN: ICD-10-PCS | Mod: BHI,95,, | Performed by: SOCIAL WORKER

## 2021-01-09 NOTE — TELEPHONE ENCOUNTER
Reason for Disposition   [1] Follow-up call from patient regarding patient's clinical status AND [2] information urgent    Answer Assessment - Initial Assessment Questions  [place], [facility] attempting to get in touch with pcp.  Pt with cough,no wheezing but some adventitious breath sounds in bases. Vs today 142/28-  -R 28. Had some chest pain yesterday and earlier today. Wt difference of 5# since last seen 11/13/18.    Protocols used: ST PCP CALL - NO TRIAGE-A-AH       Patient/EMS

## 2021-01-11 ENCOUNTER — TELEPHONE (OUTPATIENT)
Dept: PODIATRY | Facility: CLINIC | Age: 66
End: 2021-01-11

## 2021-01-12 ENCOUNTER — OFFICE VISIT (OUTPATIENT)
Dept: ORTHOPEDICS | Facility: CLINIC | Age: 66
End: 2021-01-12
Payer: MEDICARE

## 2021-01-12 VITALS
DIASTOLIC BLOOD PRESSURE: 70 MMHG | BODY MASS INDEX: 30.99 KG/M2 | SYSTOLIC BLOOD PRESSURE: 122 MMHG | HEIGHT: 65 IN | HEART RATE: 108 BPM | WEIGHT: 186 LBS

## 2021-01-12 DIAGNOSIS — M75.102 ROTATOR CUFF TEAR ARTHROPATHY OF LEFT SHOULDER: Primary | ICD-10-CM

## 2021-01-12 DIAGNOSIS — M12.812 ROTATOR CUFF TEAR ARTHROPATHY OF LEFT SHOULDER: Primary | ICD-10-CM

## 2021-01-12 PROCEDURE — 3008F BODY MASS INDEX DOCD: CPT | Mod: CPTII,S$GLB,, | Performed by: STUDENT IN AN ORGANIZED HEALTH CARE EDUCATION/TRAINING PROGRAM

## 2021-01-12 PROCEDURE — 99999 PR PBB SHADOW E&M-EST. PATIENT-LVL IV: CPT | Mod: PBBFAC,,, | Performed by: STUDENT IN AN ORGANIZED HEALTH CARE EDUCATION/TRAINING PROGRAM

## 2021-01-12 PROCEDURE — 1125F AMNT PAIN NOTED PAIN PRSNT: CPT | Mod: S$GLB,,, | Performed by: STUDENT IN AN ORGANIZED HEALTH CARE EDUCATION/TRAINING PROGRAM

## 2021-01-12 PROCEDURE — 1125F PR PAIN SEVERITY QUANTIFIED, PAIN PRESENT: ICD-10-PCS | Mod: S$GLB,,, | Performed by: STUDENT IN AN ORGANIZED HEALTH CARE EDUCATION/TRAINING PROGRAM

## 2021-01-12 PROCEDURE — 3074F PR MOST RECENT SYSTOLIC BLOOD PRESSURE < 130 MM HG: ICD-10-PCS | Mod: CPTII,S$GLB,, | Performed by: STUDENT IN AN ORGANIZED HEALTH CARE EDUCATION/TRAINING PROGRAM

## 2021-01-12 PROCEDURE — 3008F PR BODY MASS INDEX (BMI) DOCUMENTED: ICD-10-PCS | Mod: CPTII,S$GLB,, | Performed by: STUDENT IN AN ORGANIZED HEALTH CARE EDUCATION/TRAINING PROGRAM

## 2021-01-12 PROCEDURE — 99214 PR OFFICE/OUTPT VISIT, EST, LEVL IV, 30-39 MIN: ICD-10-PCS | Mod: S$GLB,,, | Performed by: STUDENT IN AN ORGANIZED HEALTH CARE EDUCATION/TRAINING PROGRAM

## 2021-01-12 PROCEDURE — 3078F PR MOST RECENT DIASTOLIC BLOOD PRESSURE < 80 MM HG: ICD-10-PCS | Mod: CPTII,S$GLB,, | Performed by: STUDENT IN AN ORGANIZED HEALTH CARE EDUCATION/TRAINING PROGRAM

## 2021-01-12 PROCEDURE — 3074F SYST BP LT 130 MM HG: CPT | Mod: CPTII,S$GLB,, | Performed by: STUDENT IN AN ORGANIZED HEALTH CARE EDUCATION/TRAINING PROGRAM

## 2021-01-12 PROCEDURE — 99214 OFFICE O/P EST MOD 30 MIN: CPT | Mod: S$GLB,,, | Performed by: STUDENT IN AN ORGANIZED HEALTH CARE EDUCATION/TRAINING PROGRAM

## 2021-01-12 PROCEDURE — 3078F DIAST BP <80 MM HG: CPT | Mod: CPTII,S$GLB,, | Performed by: STUDENT IN AN ORGANIZED HEALTH CARE EDUCATION/TRAINING PROGRAM

## 2021-01-12 PROCEDURE — 99999 PR PBB SHADOW E&M-EST. PATIENT-LVL IV: ICD-10-PCS | Mod: PBBFAC,,, | Performed by: STUDENT IN AN ORGANIZED HEALTH CARE EDUCATION/TRAINING PROGRAM

## 2021-01-15 DIAGNOSIS — G54.8 PHANTOM PAIN: ICD-10-CM

## 2021-01-15 DIAGNOSIS — R52 PHANTOM PAIN: ICD-10-CM

## 2021-01-15 RX ORDER — HYDROCODONE BITARTRATE AND ACETAMINOPHEN 10; 325 MG/1; MG/1
1 TABLET ORAL EVERY 12 HOURS PRN
Qty: 20 TABLET | Refills: 0 | Status: SHIPPED | OUTPATIENT
Start: 2021-01-15 | End: 2021-04-19

## 2021-01-20 ENCOUNTER — CLINICAL SUPPORT (OUTPATIENT)
Dept: PRIMARY CARE CLINIC | Facility: CLINIC | Age: 66
End: 2021-01-20
Payer: MEDICARE

## 2021-01-20 DIAGNOSIS — F41.1 GAD (GENERALIZED ANXIETY DISORDER): ICD-10-CM

## 2021-01-20 DIAGNOSIS — F33.2 SEVERE EPISODE OF RECURRENT MAJOR DEPRESSIVE DISORDER, WITHOUT PSYCHOTIC FEATURES: Primary | ICD-10-CM

## 2021-01-20 DIAGNOSIS — G89.29 OTHER CHRONIC PAIN: ICD-10-CM

## 2021-01-20 PROCEDURE — 90834 PR PSYCHOTHERAPY W/PATIENT, 45 MIN: ICD-10-PCS | Mod: BHI,95,, | Performed by: SOCIAL WORKER

## 2021-01-20 PROCEDURE — 90834 PSYTX W PT 45 MINUTES: CPT | Mod: BHI,95,, | Performed by: SOCIAL WORKER

## 2021-01-21 ENCOUNTER — CLINICAL SUPPORT (OUTPATIENT)
Dept: REHABILITATION | Facility: HOSPITAL | Age: 66
End: 2021-01-21
Payer: MEDICARE

## 2021-01-21 ENCOUNTER — OFFICE VISIT (OUTPATIENT)
Dept: DIABETES | Facility: CLINIC | Age: 66
End: 2021-01-21
Payer: MEDICARE

## 2021-01-21 VITALS
WEIGHT: 194.88 LBS | HEART RATE: 81 BPM | DIASTOLIC BLOOD PRESSURE: 89 MMHG | SYSTOLIC BLOOD PRESSURE: 166 MMHG | BODY MASS INDEX: 32.43 KG/M2

## 2021-01-21 DIAGNOSIS — E11.65 UNCONTROLLED TYPE 2 DIABETES MELLITUS WITH HYPERGLYCEMIA: Primary | ICD-10-CM

## 2021-01-21 DIAGNOSIS — E66.9 OBESITY (BMI 30.0-34.9): ICD-10-CM

## 2021-01-21 DIAGNOSIS — E11.69 HYPERLIPIDEMIA ASSOCIATED WITH TYPE 2 DIABETES MELLITUS: ICD-10-CM

## 2021-01-21 DIAGNOSIS — N52.9 ERECTILE DYSFUNCTION, UNSPECIFIED ERECTILE DYSFUNCTION TYPE: ICD-10-CM

## 2021-01-21 DIAGNOSIS — G89.29 CHRONIC LEFT SHOULDER PAIN: ICD-10-CM

## 2021-01-21 DIAGNOSIS — E21.5 DISORDER OF PARATHYROID GLAND: ICD-10-CM

## 2021-01-21 DIAGNOSIS — M75.102 ROTATOR CUFF TEAR ARTHROPATHY OF LEFT SHOULDER: ICD-10-CM

## 2021-01-21 DIAGNOSIS — E11.42 DIABETIC POLYNEUROPATHY ASSOCIATED WITH TYPE 2 DIABETES MELLITUS: ICD-10-CM

## 2021-01-21 DIAGNOSIS — E29.1 HYPOGONADISM IN MALE: ICD-10-CM

## 2021-01-21 DIAGNOSIS — I50.32 CHRONIC DIASTOLIC HEART FAILURE: ICD-10-CM

## 2021-01-21 DIAGNOSIS — M25.512 CHRONIC LEFT SHOULDER PAIN: ICD-10-CM

## 2021-01-21 DIAGNOSIS — N18.30 STAGE 3 CHRONIC KIDNEY DISEASE, UNSPECIFIED WHETHER STAGE 3A OR 3B CKD: ICD-10-CM

## 2021-01-21 DIAGNOSIS — I15.2 HYPERTENSION ASSOCIATED WITH DIABETES: ICD-10-CM

## 2021-01-21 DIAGNOSIS — I25.118 CORONARY ARTERY DISEASE OF NATIVE ARTERY OF NATIVE HEART WITH STABLE ANGINA PECTORIS: ICD-10-CM

## 2021-01-21 DIAGNOSIS — E11.59 HYPERTENSION ASSOCIATED WITH DIABETES: ICD-10-CM

## 2021-01-21 DIAGNOSIS — M12.812 ROTATOR CUFF TEAR ARTHROPATHY OF LEFT SHOULDER: ICD-10-CM

## 2021-01-21 DIAGNOSIS — E11.3312 MODERATE NONPROLIFERATIVE DIABETIC RETINOPATHY OF LEFT EYE WITH MACULAR EDEMA ASSOCIATED WITH TYPE 2 DIABETES MELLITUS: ICD-10-CM

## 2021-01-21 DIAGNOSIS — E78.5 HYPERLIPIDEMIA ASSOCIATED WITH TYPE 2 DIABETES MELLITUS: ICD-10-CM

## 2021-01-21 LAB — GLUCOSE SERPL-MCNC: 113 MG/DL (ref 70–110)

## 2021-01-21 PROCEDURE — 3077F PR MOST RECENT SYSTOLIC BLOOD PRESSURE >= 140 MM HG: ICD-10-PCS | Mod: CPTII,S$GLB,, | Performed by: PHYSICIAN ASSISTANT

## 2021-01-21 PROCEDURE — 3008F BODY MASS INDEX DOCD: CPT | Mod: CPTII,S$GLB,, | Performed by: PHYSICIAN ASSISTANT

## 2021-01-21 PROCEDURE — 3008F PR BODY MASS INDEX (BMI) DOCUMENTED: ICD-10-PCS | Mod: CPTII,S$GLB,, | Performed by: PHYSICIAN ASSISTANT

## 2021-01-21 PROCEDURE — 3044F PR MOST RECENT HEMOGLOBIN A1C LEVEL <7.0%: ICD-10-PCS | Mod: CPTII,S$GLB,, | Performed by: PHYSICIAN ASSISTANT

## 2021-01-21 PROCEDURE — 82962 POCT GLUCOSE, HAND-HELD DEVICE: ICD-10-PCS | Mod: S$GLB,,, | Performed by: PHYSICIAN ASSISTANT

## 2021-01-21 PROCEDURE — 82962 GLUCOSE BLOOD TEST: CPT | Mod: S$GLB,,, | Performed by: PHYSICIAN ASSISTANT

## 2021-01-21 PROCEDURE — 3077F SYST BP >= 140 MM HG: CPT | Mod: CPTII,S$GLB,, | Performed by: PHYSICIAN ASSISTANT

## 2021-01-21 PROCEDURE — 99499 UNLISTED E&M SERVICE: CPT | Mod: S$GLB,,, | Performed by: PHYSICIAN ASSISTANT

## 2021-01-21 PROCEDURE — 99214 OFFICE O/P EST MOD 30 MIN: CPT | Mod: S$GLB,,, | Performed by: PHYSICIAN ASSISTANT

## 2021-01-21 PROCEDURE — 3079F DIAST BP 80-89 MM HG: CPT | Mod: CPTII,S$GLB,, | Performed by: PHYSICIAN ASSISTANT

## 2021-01-21 PROCEDURE — 99999 PR PBB SHADOW E&M-EST. PATIENT-LVL IV: CPT | Mod: PBBFAC,,, | Performed by: PHYSICIAN ASSISTANT

## 2021-01-21 PROCEDURE — 99999 PR PBB SHADOW E&M-EST. PATIENT-LVL IV: ICD-10-PCS | Mod: PBBFAC,,, | Performed by: PHYSICIAN ASSISTANT

## 2021-01-21 PROCEDURE — 3044F HG A1C LEVEL LT 7.0%: CPT | Mod: CPTII,S$GLB,, | Performed by: PHYSICIAN ASSISTANT

## 2021-01-21 PROCEDURE — 99499 RISK ADDL DX/OHS AUDIT: ICD-10-PCS | Mod: S$GLB,,, | Performed by: PHYSICIAN ASSISTANT

## 2021-01-21 PROCEDURE — 97162 PT EVAL MOD COMPLEX 30 MIN: CPT | Performed by: PHYSICAL THERAPIST

## 2021-01-21 PROCEDURE — 3079F PR MOST RECENT DIASTOLIC BLOOD PRESSURE 80-89 MM HG: ICD-10-PCS | Mod: CPTII,S$GLB,, | Performed by: PHYSICIAN ASSISTANT

## 2021-01-21 PROCEDURE — 99214 PR OFFICE/OUTPT VISIT, EST, LEVL IV, 30-39 MIN: ICD-10-PCS | Mod: S$GLB,,, | Performed by: PHYSICIAN ASSISTANT

## 2021-01-23 ENCOUNTER — NURSE TRIAGE (OUTPATIENT)
Dept: ADMINISTRATIVE | Facility: CLINIC | Age: 66
End: 2021-01-23

## 2021-01-29 ENCOUNTER — CLINICAL SUPPORT (OUTPATIENT)
Dept: REHABILITATION | Facility: HOSPITAL | Age: 66
End: 2021-01-29
Payer: MEDICARE

## 2021-01-29 ENCOUNTER — DOCUMENTATION ONLY (OUTPATIENT)
Dept: REHABILITATION | Facility: HOSPITAL | Age: 66
End: 2021-01-29

## 2021-01-29 DIAGNOSIS — G89.29 CHRONIC LEFT SHOULDER PAIN: ICD-10-CM

## 2021-01-29 DIAGNOSIS — M25.512 CHRONIC LEFT SHOULDER PAIN: ICD-10-CM

## 2021-01-29 PROCEDURE — 97110 THERAPEUTIC EXERCISES: CPT | Mod: CQ

## 2021-01-29 PROCEDURE — 97140 MANUAL THERAPY 1/> REGIONS: CPT | Mod: CQ

## 2021-02-02 ENCOUNTER — TELEPHONE (OUTPATIENT)
Dept: PRIMARY CARE CLINIC | Facility: CLINIC | Age: 66
End: 2021-02-02

## 2021-02-03 ENCOUNTER — CLINICAL SUPPORT (OUTPATIENT)
Dept: PRIMARY CARE CLINIC | Facility: CLINIC | Age: 66
End: 2021-02-03
Payer: MEDICARE

## 2021-02-03 ENCOUNTER — CLINICAL SUPPORT (OUTPATIENT)
Dept: DIABETES | Facility: CLINIC | Age: 66
End: 2021-02-03
Payer: MEDICARE

## 2021-02-03 VITALS — HEIGHT: 65 IN | BODY MASS INDEX: 31.85 KG/M2 | WEIGHT: 191.13 LBS

## 2021-02-03 DIAGNOSIS — E11.65 UNCONTROLLED TYPE 2 DIABETES MELLITUS WITH HYPERGLYCEMIA: Primary | ICD-10-CM

## 2021-02-03 DIAGNOSIS — G89.29 CHRONIC LEFT SHOULDER PAIN: ICD-10-CM

## 2021-02-03 DIAGNOSIS — G89.29 OTHER CHRONIC PAIN: ICD-10-CM

## 2021-02-03 DIAGNOSIS — F33.2 SEVERE EPISODE OF RECURRENT MAJOR DEPRESSIVE DISORDER, WITHOUT PSYCHOTIC FEATURES: Primary | ICD-10-CM

## 2021-02-03 DIAGNOSIS — M25.512 CHRONIC LEFT SHOULDER PAIN: ICD-10-CM

## 2021-02-03 DIAGNOSIS — F41.1 GAD (GENERALIZED ANXIETY DISORDER): ICD-10-CM

## 2021-02-03 PROCEDURE — 99999 PR PBB SHADOW E&M-EST. PATIENT-LVL II: ICD-10-PCS | Mod: PBBFAC,,, | Performed by: DIETITIAN, REGISTERED

## 2021-02-03 PROCEDURE — 90832 PSYTX W PT 30 MINUTES: CPT | Mod: BHI,95,, | Performed by: SOCIAL WORKER

## 2021-02-03 PROCEDURE — 90832 PR PSYCHOTHERAPY W/PATIENT, 30 MIN: ICD-10-PCS | Mod: BHI,95,, | Performed by: SOCIAL WORKER

## 2021-02-03 PROCEDURE — G0108 DIAB MANAGE TRN  PER INDIV: HCPCS | Mod: S$GLB,,, | Performed by: DIETITIAN, REGISTERED

## 2021-02-03 PROCEDURE — 99999 PR PBB SHADOW E&M-EST. PATIENT-LVL II: CPT | Mod: PBBFAC,,, | Performed by: DIETITIAN, REGISTERED

## 2021-02-03 PROCEDURE — G0108 PR DIAB MANAGE TRN  PER INDIV: ICD-10-PCS | Mod: S$GLB,,, | Performed by: DIETITIAN, REGISTERED

## 2021-02-04 ENCOUNTER — TELEPHONE (OUTPATIENT)
Dept: INTERNAL MEDICINE | Facility: CLINIC | Age: 66
End: 2021-02-04

## 2021-02-04 ENCOUNTER — CLINICAL SUPPORT (OUTPATIENT)
Dept: REHABILITATION | Facility: HOSPITAL | Age: 66
End: 2021-02-04
Payer: MEDICARE

## 2021-02-04 DIAGNOSIS — M47.26 OSTEOARTHRITIS OF SPINE WITH RADICULOPATHY, LUMBAR REGION: ICD-10-CM

## 2021-02-04 DIAGNOSIS — M25.512 CHRONIC LEFT SHOULDER PAIN: ICD-10-CM

## 2021-02-04 DIAGNOSIS — G89.29 OTHER CHRONIC PAIN: Primary | ICD-10-CM

## 2021-02-04 DIAGNOSIS — G89.29 CHRONIC LEFT SHOULDER PAIN: ICD-10-CM

## 2021-02-04 PROCEDURE — 97140 MANUAL THERAPY 1/> REGIONS: CPT | Performed by: PHYSICAL THERAPIST

## 2021-02-04 PROCEDURE — 97110 THERAPEUTIC EXERCISES: CPT | Performed by: PHYSICAL THERAPIST

## 2021-02-05 ENCOUNTER — CLINICAL SUPPORT (OUTPATIENT)
Dept: REHABILITATION | Facility: HOSPITAL | Age: 66
End: 2021-02-05
Payer: MEDICARE

## 2021-02-05 DIAGNOSIS — M25.512 CHRONIC LEFT SHOULDER PAIN: ICD-10-CM

## 2021-02-05 DIAGNOSIS — G89.29 CHRONIC LEFT SHOULDER PAIN: ICD-10-CM

## 2021-02-05 PROCEDURE — 97110 THERAPEUTIC EXERCISES: CPT | Performed by: PHYSICAL THERAPIST

## 2021-02-05 PROCEDURE — 97140 MANUAL THERAPY 1/> REGIONS: CPT | Performed by: PHYSICAL THERAPIST

## 2021-02-08 DIAGNOSIS — M47.26 OSTEOARTHRITIS OF SPINE WITH RADICULOPATHY, LUMBAR REGION: ICD-10-CM

## 2021-02-08 DIAGNOSIS — M51.26 HNP (HERNIATED NUCLEUS PULPOSUS), LUMBAR: ICD-10-CM

## 2021-02-08 RX ORDER — GABAPENTIN 800 MG/1
800 TABLET ORAL 3 TIMES DAILY
Qty: 270 TABLET | Refills: 4 | Status: SHIPPED | OUTPATIENT
Start: 2021-02-08 | End: 2021-03-23 | Stop reason: SDUPTHER

## 2021-02-10 ENCOUNTER — TELEPHONE (OUTPATIENT)
Dept: PRIMARY CARE CLINIC | Facility: CLINIC | Age: 66
End: 2021-02-10

## 2021-02-11 ENCOUNTER — TELEPHONE (OUTPATIENT)
Dept: PAIN MEDICINE | Facility: CLINIC | Age: 66
End: 2021-02-11

## 2021-02-11 ENCOUNTER — TELEPHONE (OUTPATIENT)
Dept: PRIMARY CARE CLINIC | Facility: CLINIC | Age: 66
End: 2021-02-11

## 2021-02-12 ENCOUNTER — TELEPHONE (OUTPATIENT)
Dept: PRIMARY CARE CLINIC | Facility: CLINIC | Age: 66
End: 2021-02-12

## 2021-02-12 ENCOUNTER — TELEPHONE (OUTPATIENT)
Dept: DIABETES | Facility: CLINIC | Age: 66
End: 2021-02-12

## 2021-02-19 ENCOUNTER — TELEPHONE (OUTPATIENT)
Dept: PRIMARY CARE CLINIC | Facility: CLINIC | Age: 66
End: 2021-02-19

## 2021-02-22 ENCOUNTER — TELEPHONE (OUTPATIENT)
Dept: REHABILITATION | Facility: HOSPITAL | Age: 66
End: 2021-02-22

## 2021-02-22 ENCOUNTER — TELEPHONE (OUTPATIENT)
Dept: PRIMARY CARE CLINIC | Facility: CLINIC | Age: 66
End: 2021-02-22

## 2021-02-22 DIAGNOSIS — Z79.4 TYPE 2 DIABETES MELLITUS WITH MILD NONPROLIFERATIVE RETINOPATHY, WITH LONG-TERM CURRENT USE OF INSULIN, MACULAR EDEMA PRESENCE UNSPECIFIED, UNSPECIFIED LATERALITY: ICD-10-CM

## 2021-02-22 DIAGNOSIS — E11.3299 TYPE 2 DIABETES MELLITUS WITH MILD NONPROLIFERATIVE RETINOPATHY, WITH LONG-TERM CURRENT USE OF INSULIN, MACULAR EDEMA PRESENCE UNSPECIFIED, UNSPECIFIED LATERALITY: ICD-10-CM

## 2021-02-22 RX ORDER — INSULIN GLARGINE 100 [IU]/ML
INJECTION, SOLUTION SUBCUTANEOUS
Qty: 75 ML | Refills: 4
Start: 2021-02-22 | End: 2021-03-23 | Stop reason: SDUPTHER

## 2021-02-22 RX ORDER — INSULIN ASPART 100 [IU]/ML
INJECTION, SOLUTION INTRAVENOUS; SUBCUTANEOUS
Qty: 15 ML | Refills: 0
Start: 2021-02-22 | End: 2021-03-23 | Stop reason: SDUPTHER

## 2021-02-23 ENCOUNTER — CLINICAL SUPPORT (OUTPATIENT)
Dept: PRIMARY CARE CLINIC | Facility: CLINIC | Age: 66
End: 2021-02-23
Payer: MEDICARE

## 2021-02-23 DIAGNOSIS — F33.1 MDD (MAJOR DEPRESSIVE DISORDER), RECURRENT EPISODE, MODERATE: Primary | ICD-10-CM

## 2021-02-23 DIAGNOSIS — F41.1 GAD (GENERALIZED ANXIETY DISORDER): ICD-10-CM

## 2021-02-23 PROCEDURE — 90837 PSYTX W PT 60 MINUTES: CPT | Mod: BHI,95,, | Performed by: SOCIAL WORKER

## 2021-02-23 PROCEDURE — 90837 PR PSYCHOTHERAPY W/PATIENT, 60 MIN: ICD-10-PCS | Mod: BHI,95,, | Performed by: SOCIAL WORKER

## 2021-02-24 DIAGNOSIS — R39.11 BENIGN PROSTATIC HYPERPLASIA WITH URINARY HESITANCY: ICD-10-CM

## 2021-02-24 DIAGNOSIS — N40.1 BENIGN PROSTATIC HYPERPLASIA WITH URINARY HESITANCY: ICD-10-CM

## 2021-02-25 RX ORDER — TAMSULOSIN HYDROCHLORIDE 0.4 MG/1
0.4 CAPSULE ORAL DAILY
Qty: 30 CAPSULE | Refills: 11 | Status: SHIPPED | OUTPATIENT
Start: 2021-02-25 | End: 2021-03-23 | Stop reason: SDUPTHER

## 2021-03-03 ENCOUNTER — CLINICAL SUPPORT (OUTPATIENT)
Dept: REHABILITATION | Facility: HOSPITAL | Age: 66
End: 2021-03-03
Payer: MEDICARE

## 2021-03-03 DIAGNOSIS — G89.29 CHRONIC LEFT SHOULDER PAIN: ICD-10-CM

## 2021-03-03 DIAGNOSIS — M25.512 CHRONIC LEFT SHOULDER PAIN: ICD-10-CM

## 2021-03-03 PROCEDURE — 97110 THERAPEUTIC EXERCISES: CPT | Performed by: PHYSICAL THERAPIST

## 2021-03-03 PROCEDURE — 97140 MANUAL THERAPY 1/> REGIONS: CPT | Performed by: PHYSICAL THERAPIST

## 2021-03-04 ENCOUNTER — PATIENT OUTREACH (OUTPATIENT)
Dept: ADMINISTRATIVE | Facility: OTHER | Age: 66
End: 2021-03-04

## 2021-03-08 ENCOUNTER — TELEPHONE (OUTPATIENT)
Dept: PRIMARY CARE CLINIC | Facility: CLINIC | Age: 66
End: 2021-03-08

## 2021-03-09 ENCOUNTER — CLINICAL SUPPORT (OUTPATIENT)
Dept: PRIMARY CARE CLINIC | Facility: CLINIC | Age: 66
End: 2021-03-09
Payer: MEDICARE

## 2021-03-09 ENCOUNTER — TELEPHONE (OUTPATIENT)
Dept: PRIMARY CARE CLINIC | Facility: CLINIC | Age: 66
End: 2021-03-09

## 2021-03-09 PROCEDURE — 99499 UNLISTED E&M SERVICE: CPT | Mod: BHI,95,, | Performed by: SOCIAL WORKER

## 2021-03-09 PROCEDURE — 99499 NO LOS: ICD-10-PCS | Mod: BHI,95,, | Performed by: SOCIAL WORKER

## 2021-03-11 ENCOUNTER — OFFICE VISIT (OUTPATIENT)
Dept: PAIN MEDICINE | Facility: CLINIC | Age: 66
End: 2021-03-11
Payer: MEDICARE

## 2021-03-11 VITALS
BODY MASS INDEX: 32.25 KG/M2 | SYSTOLIC BLOOD PRESSURE: 169 MMHG | HEIGHT: 65 IN | WEIGHT: 193.56 LBS | DIASTOLIC BLOOD PRESSURE: 88 MMHG | HEART RATE: 83 BPM

## 2021-03-11 DIAGNOSIS — M47.26 OSTEOARTHRITIS OF SPINE WITH RADICULOPATHY, LUMBAR REGION: ICD-10-CM

## 2021-03-11 DIAGNOSIS — G89.29 OTHER CHRONIC PAIN: ICD-10-CM

## 2021-03-11 DIAGNOSIS — M79.18 MYOFASCIAL MUSCLE PAIN: ICD-10-CM

## 2021-03-11 DIAGNOSIS — M47.816 LUMBAR SPONDYLOSIS: ICD-10-CM

## 2021-03-11 DIAGNOSIS — M53.3 SACROILIAC JOINT PAIN: Primary | ICD-10-CM

## 2021-03-11 DIAGNOSIS — M79.18 RHOMBOID MUSCLE PAIN: ICD-10-CM

## 2021-03-11 PROCEDURE — 99213 OFFICE O/P EST LOW 20 MIN: CPT | Mod: S$GLB,,, | Performed by: ANESTHESIOLOGY

## 2021-03-11 PROCEDURE — 1125F PR PAIN SEVERITY QUANTIFIED, PAIN PRESENT: ICD-10-PCS | Mod: S$GLB,,, | Performed by: ANESTHESIOLOGY

## 2021-03-11 PROCEDURE — 3008F PR BODY MASS INDEX (BMI) DOCUMENTED: ICD-10-PCS | Mod: CPTII,S$GLB,, | Performed by: ANESTHESIOLOGY

## 2021-03-11 PROCEDURE — 3077F PR MOST RECENT SYSTOLIC BLOOD PRESSURE >= 140 MM HG: ICD-10-PCS | Mod: CPTII,S$GLB,, | Performed by: ANESTHESIOLOGY

## 2021-03-11 PROCEDURE — 99999 PR PBB SHADOW E&M-EST. PATIENT-LVL V: ICD-10-PCS | Mod: PBBFAC,,, | Performed by: ANESTHESIOLOGY

## 2021-03-11 PROCEDURE — 99213 PR OFFICE/OUTPT VISIT, EST, LEVL III, 20-29 MIN: ICD-10-PCS | Mod: S$GLB,,, | Performed by: ANESTHESIOLOGY

## 2021-03-11 PROCEDURE — 3077F SYST BP >= 140 MM HG: CPT | Mod: CPTII,S$GLB,, | Performed by: ANESTHESIOLOGY

## 2021-03-11 PROCEDURE — 3008F BODY MASS INDEX DOCD: CPT | Mod: CPTII,S$GLB,, | Performed by: ANESTHESIOLOGY

## 2021-03-11 PROCEDURE — 99999 PR PBB SHADOW E&M-EST. PATIENT-LVL V: CPT | Mod: PBBFAC,,, | Performed by: ANESTHESIOLOGY

## 2021-03-11 PROCEDURE — 1125F AMNT PAIN NOTED PAIN PRSNT: CPT | Mod: S$GLB,,, | Performed by: ANESTHESIOLOGY

## 2021-03-11 PROCEDURE — 3079F DIAST BP 80-89 MM HG: CPT | Mod: CPTII,S$GLB,, | Performed by: ANESTHESIOLOGY

## 2021-03-11 PROCEDURE — 3079F PR MOST RECENT DIASTOLIC BLOOD PRESSURE 80-89 MM HG: ICD-10-PCS | Mod: CPTII,S$GLB,, | Performed by: ANESTHESIOLOGY

## 2021-03-12 ENCOUNTER — CLINICAL SUPPORT (OUTPATIENT)
Dept: REHABILITATION | Facility: HOSPITAL | Age: 66
End: 2021-03-12
Payer: MEDICARE

## 2021-03-12 ENCOUNTER — OFFICE VISIT (OUTPATIENT)
Dept: INTERNAL MEDICINE | Facility: CLINIC | Age: 66
End: 2021-03-12
Payer: MEDICARE

## 2021-03-12 ENCOUNTER — DOCUMENTATION ONLY (OUTPATIENT)
Dept: REHABILITATION | Facility: HOSPITAL | Age: 66
End: 2021-03-12

## 2021-03-12 VITALS
SYSTOLIC BLOOD PRESSURE: 142 MMHG | BODY MASS INDEX: 32.36 KG/M2 | HEART RATE: 87 BPM | HEIGHT: 65 IN | OXYGEN SATURATION: 96 % | WEIGHT: 194.25 LBS | TEMPERATURE: 98 F | DIASTOLIC BLOOD PRESSURE: 82 MMHG

## 2021-03-12 DIAGNOSIS — G89.29 CHRONIC LEFT SHOULDER PAIN: ICD-10-CM

## 2021-03-12 DIAGNOSIS — L02.212 BACK ABSCESS: Primary | ICD-10-CM

## 2021-03-12 DIAGNOSIS — M25.512 CHRONIC LEFT SHOULDER PAIN: ICD-10-CM

## 2021-03-12 PROCEDURE — 99999 PR PBB SHADOW E&M-EST. PATIENT-LVL V: CPT | Mod: PBBFAC,,, | Performed by: NURSE PRACTITIONER

## 2021-03-12 PROCEDURE — 3077F PR MOST RECENT SYSTOLIC BLOOD PRESSURE >= 140 MM HG: ICD-10-PCS | Mod: CPTII,S$GLB,, | Performed by: NURSE PRACTITIONER

## 2021-03-12 PROCEDURE — 99999 PR PBB SHADOW E&M-EST. PATIENT-LVL V: ICD-10-PCS | Mod: PBBFAC,,, | Performed by: NURSE PRACTITIONER

## 2021-03-12 PROCEDURE — 3077F SYST BP >= 140 MM HG: CPT | Mod: CPTII,S$GLB,, | Performed by: NURSE PRACTITIONER

## 2021-03-12 PROCEDURE — 1125F AMNT PAIN NOTED PAIN PRSNT: CPT | Mod: S$GLB,,, | Performed by: NURSE PRACTITIONER

## 2021-03-12 PROCEDURE — 3079F DIAST BP 80-89 MM HG: CPT | Mod: CPTII,S$GLB,, | Performed by: NURSE PRACTITIONER

## 2021-03-12 PROCEDURE — 99214 PR OFFICE/OUTPT VISIT, EST, LEVL IV, 30-39 MIN: ICD-10-PCS | Mod: S$GLB,,, | Performed by: NURSE PRACTITIONER

## 2021-03-12 PROCEDURE — 3008F BODY MASS INDEX DOCD: CPT | Mod: CPTII,S$GLB,, | Performed by: NURSE PRACTITIONER

## 2021-03-12 PROCEDURE — 3079F PR MOST RECENT DIASTOLIC BLOOD PRESSURE 80-89 MM HG: ICD-10-PCS | Mod: CPTII,S$GLB,, | Performed by: NURSE PRACTITIONER

## 2021-03-12 PROCEDURE — 99214 OFFICE O/P EST MOD 30 MIN: CPT | Mod: S$GLB,,, | Performed by: NURSE PRACTITIONER

## 2021-03-12 PROCEDURE — 3008F PR BODY MASS INDEX (BMI) DOCUMENTED: ICD-10-PCS | Mod: CPTII,S$GLB,, | Performed by: NURSE PRACTITIONER

## 2021-03-12 PROCEDURE — 1125F PR PAIN SEVERITY QUANTIFIED, PAIN PRESENT: ICD-10-PCS | Mod: S$GLB,,, | Performed by: NURSE PRACTITIONER

## 2021-03-12 PROCEDURE — 97110 THERAPEUTIC EXERCISES: CPT | Mod: CQ

## 2021-03-12 RX ORDER — SULFAMETHOXAZOLE AND TRIMETHOPRIM 800; 160 MG/1; MG/1
1 TABLET ORAL 2 TIMES DAILY
Qty: 20 TABLET | Refills: 0 | Status: SHIPPED | OUTPATIENT
Start: 2021-03-12 | End: 2021-03-22

## 2021-03-15 ENCOUNTER — PES CALL (OUTPATIENT)
Dept: ADMINISTRATIVE | Facility: CLINIC | Age: 66
End: 2021-03-15

## 2021-03-18 ENCOUNTER — OFFICE VISIT (OUTPATIENT)
Dept: PODIATRY | Facility: CLINIC | Age: 66
End: 2021-03-18
Payer: MEDICARE

## 2021-03-18 VITALS
SYSTOLIC BLOOD PRESSURE: 172 MMHG | HEIGHT: 65 IN | HEART RATE: 88 BPM | BODY MASS INDEX: 32.32 KG/M2 | DIASTOLIC BLOOD PRESSURE: 94 MMHG | WEIGHT: 194 LBS

## 2021-03-18 DIAGNOSIS — B35.1 DERMATOPHYTOSIS OF NAIL: ICD-10-CM

## 2021-03-18 DIAGNOSIS — E11.49 TYPE II DIABETES MELLITUS WITH NEUROLOGICAL MANIFESTATIONS: Primary | ICD-10-CM

## 2021-03-18 DIAGNOSIS — L84 PRE-ULCERATIVE CALLUSES: ICD-10-CM

## 2021-03-18 PROCEDURE — 11056 PARNG/CUTG B9 HYPRKR LES 2-4: CPT | Mod: Q7,S$GLB,, | Performed by: PODIATRIST

## 2021-03-18 PROCEDURE — 11721 DEBRIDE NAIL 6 OR MORE: CPT | Mod: 59,Q7,S$GLB, | Performed by: PODIATRIST

## 2021-03-18 PROCEDURE — 1125F AMNT PAIN NOTED PAIN PRSNT: CPT | Mod: S$GLB,,, | Performed by: PODIATRIST

## 2021-03-18 PROCEDURE — 11056 PR TRIM BENIGN HYPERKERATOTIC SKIN LESION,2-4: ICD-10-PCS | Mod: Q7,S$GLB,, | Performed by: PODIATRIST

## 2021-03-18 PROCEDURE — 99213 OFFICE O/P EST LOW 20 MIN: CPT | Mod: 25,S$GLB,, | Performed by: PODIATRIST

## 2021-03-18 PROCEDURE — 3077F PR MOST RECENT SYSTOLIC BLOOD PRESSURE >= 140 MM HG: ICD-10-PCS | Mod: CPTII,S$GLB,, | Performed by: PODIATRIST

## 2021-03-18 PROCEDURE — 3077F SYST BP >= 140 MM HG: CPT | Mod: CPTII,S$GLB,, | Performed by: PODIATRIST

## 2021-03-18 PROCEDURE — 99999 PR PBB SHADOW E&M-EST. PATIENT-LVL V: CPT | Mod: PBBFAC,,, | Performed by: PODIATRIST

## 2021-03-18 PROCEDURE — 3008F PR BODY MASS INDEX (BMI) DOCUMENTED: ICD-10-PCS | Mod: CPTII,S$GLB,, | Performed by: PODIATRIST

## 2021-03-18 PROCEDURE — 99999 PR PBB SHADOW E&M-EST. PATIENT-LVL V: ICD-10-PCS | Mod: PBBFAC,,, | Performed by: PODIATRIST

## 2021-03-18 PROCEDURE — 3080F DIAST BP >= 90 MM HG: CPT | Mod: CPTII,S$GLB,, | Performed by: PODIATRIST

## 2021-03-18 PROCEDURE — 3080F PR MOST RECENT DIASTOLIC BLOOD PRESSURE >= 90 MM HG: ICD-10-PCS | Mod: CPTII,S$GLB,, | Performed by: PODIATRIST

## 2021-03-18 PROCEDURE — 3044F PR MOST RECENT HEMOGLOBIN A1C LEVEL <7.0%: ICD-10-PCS | Mod: CPTII,S$GLB,, | Performed by: PODIATRIST

## 2021-03-18 PROCEDURE — 1125F PR PAIN SEVERITY QUANTIFIED, PAIN PRESENT: ICD-10-PCS | Mod: S$GLB,,, | Performed by: PODIATRIST

## 2021-03-18 PROCEDURE — 11721 PR DEBRIDEMENT OF NAILS, 6 OR MORE: ICD-10-PCS | Mod: 59,Q7,S$GLB, | Performed by: PODIATRIST

## 2021-03-18 PROCEDURE — 3008F BODY MASS INDEX DOCD: CPT | Mod: CPTII,S$GLB,, | Performed by: PODIATRIST

## 2021-03-18 PROCEDURE — 99213 PR OFFICE/OUTPT VISIT, EST, LEVL III, 20-29 MIN: ICD-10-PCS | Mod: 25,S$GLB,, | Performed by: PODIATRIST

## 2021-03-18 PROCEDURE — 3044F HG A1C LEVEL LT 7.0%: CPT | Mod: CPTII,S$GLB,, | Performed by: PODIATRIST

## 2021-03-19 ENCOUNTER — TELEPHONE (OUTPATIENT)
Dept: ENDOSCOPY | Facility: HOSPITAL | Age: 66
End: 2021-03-19

## 2021-03-22 ENCOUNTER — CLINICAL SUPPORT (OUTPATIENT)
Dept: REHABILITATION | Facility: HOSPITAL | Age: 66
End: 2021-03-22
Payer: MEDICARE

## 2021-03-22 ENCOUNTER — CLINICAL SUPPORT (OUTPATIENT)
Dept: PRIMARY CARE CLINIC | Facility: CLINIC | Age: 66
End: 2021-03-22
Payer: MEDICARE

## 2021-03-22 ENCOUNTER — TELEPHONE (OUTPATIENT)
Dept: PRIMARY CARE CLINIC | Facility: CLINIC | Age: 66
End: 2021-03-22

## 2021-03-22 ENCOUNTER — TELEPHONE (OUTPATIENT)
Dept: INTERNAL MEDICINE | Facility: CLINIC | Age: 66
End: 2021-03-22

## 2021-03-22 DIAGNOSIS — G89.29 CHRONIC LEFT SHOULDER PAIN: ICD-10-CM

## 2021-03-22 DIAGNOSIS — F33.1 MDD (MAJOR DEPRESSIVE DISORDER), RECURRENT EPISODE, MODERATE: Primary | ICD-10-CM

## 2021-03-22 DIAGNOSIS — F41.1 GAD (GENERALIZED ANXIETY DISORDER): ICD-10-CM

## 2021-03-22 DIAGNOSIS — M25.512 CHRONIC LEFT SHOULDER PAIN: ICD-10-CM

## 2021-03-22 DIAGNOSIS — G89.29 OTHER CHRONIC PAIN: ICD-10-CM

## 2021-03-22 PROCEDURE — 97110 THERAPEUTIC EXERCISES: CPT | Mod: CQ

## 2021-03-22 PROCEDURE — 90834 PSYTX W PT 45 MINUTES: CPT | Mod: BHI,95,, | Performed by: SOCIAL WORKER

## 2021-03-22 PROCEDURE — 90834 PR PSYCHOTHERAPY W/PATIENT, 45 MIN: ICD-10-PCS | Mod: BHI,95,, | Performed by: SOCIAL WORKER

## 2021-03-23 ENCOUNTER — TELEPHONE (OUTPATIENT)
Dept: PAIN MEDICINE | Facility: CLINIC | Age: 66
End: 2021-03-23

## 2021-03-23 DIAGNOSIS — K21.9 GASTROESOPHAGEAL REFLUX DISEASE: ICD-10-CM

## 2021-03-23 DIAGNOSIS — N40.1 BENIGN PROSTATIC HYPERPLASIA WITH URINARY HESITANCY: ICD-10-CM

## 2021-03-23 DIAGNOSIS — M47.26 OSTEOARTHRITIS OF SPINE WITH RADICULOPATHY, LUMBAR REGION: ICD-10-CM

## 2021-03-23 DIAGNOSIS — E11.69 HYPERLIPIDEMIA ASSOCIATED WITH TYPE 2 DIABETES MELLITUS: ICD-10-CM

## 2021-03-23 DIAGNOSIS — Z79.4 TYPE 2 DIABETES MELLITUS WITH MILD NONPROLIFERATIVE RETINOPATHY, WITH LONG-TERM CURRENT USE OF INSULIN, MACULAR EDEMA PRESENCE UNSPECIFIED, UNSPECIFIED LATERALITY: ICD-10-CM

## 2021-03-23 DIAGNOSIS — J45.40 MODERATE PERSISTENT ASTHMA WITHOUT COMPLICATION: ICD-10-CM

## 2021-03-23 DIAGNOSIS — E78.5 HYPERLIPIDEMIA ASSOCIATED WITH TYPE 2 DIABETES MELLITUS: ICD-10-CM

## 2021-03-23 DIAGNOSIS — E11.65 UNCONTROLLED TYPE 2 DIABETES MELLITUS WITH HYPERGLYCEMIA: ICD-10-CM

## 2021-03-23 DIAGNOSIS — E11.3299 TYPE 2 DIABETES MELLITUS WITH MILD NONPROLIFERATIVE RETINOPATHY, WITH LONG-TERM CURRENT USE OF INSULIN, MACULAR EDEMA PRESENCE UNSPECIFIED, UNSPECIFIED LATERALITY: ICD-10-CM

## 2021-03-23 DIAGNOSIS — M10.9 GOUT, UNSPECIFIED CAUSE, UNSPECIFIED CHRONICITY, UNSPECIFIED SITE: ICD-10-CM

## 2021-03-23 DIAGNOSIS — M33.13 DERMATOMYOSITIS: ICD-10-CM

## 2021-03-23 DIAGNOSIS — R39.11 BENIGN PROSTATIC HYPERPLASIA WITH URINARY HESITANCY: ICD-10-CM

## 2021-03-23 DIAGNOSIS — M51.26 HNP (HERNIATED NUCLEUS PULPOSUS), LUMBAR: ICD-10-CM

## 2021-03-24 ENCOUNTER — OFFICE VISIT (OUTPATIENT)
Dept: DERMATOLOGY | Facility: CLINIC | Age: 66
End: 2021-03-24
Payer: MEDICARE

## 2021-03-24 ENCOUNTER — HOSPITAL ENCOUNTER (OUTPATIENT)
Facility: HOSPITAL | Age: 66
Discharge: HOME | End: 2021-03-24
Attending: ANESTHESIOLOGY | Admitting: ANESTHESIOLOGY
Payer: MEDICARE

## 2021-03-24 VITALS
TEMPERATURE: 98 F | BODY MASS INDEX: 30.04 KG/M2 | HEIGHT: 65 IN | RESPIRATION RATE: 20 BRPM | WEIGHT: 180.31 LBS | DIASTOLIC BLOOD PRESSURE: 78 MMHG | OXYGEN SATURATION: 98 % | HEART RATE: 88 BPM | SYSTOLIC BLOOD PRESSURE: 137 MMHG

## 2021-03-24 DIAGNOSIS — L02.91 ABSCESS: Primary | ICD-10-CM

## 2021-03-24 DIAGNOSIS — M53.3 SACROILIAC DYSFUNCTION: Primary | ICD-10-CM

## 2021-03-24 PROCEDURE — 1126F PR PAIN SEVERITY QUANTIFIED, NO PAIN PRESENT: ICD-10-PCS | Mod: S$GLB,,, | Performed by: STUDENT IN AN ORGANIZED HEALTH CARE EDUCATION/TRAINING PROGRAM

## 2021-03-24 PROCEDURE — 25000003 PHARM REV CODE 250: Performed by: ANESTHESIOLOGY

## 2021-03-24 PROCEDURE — 82962 GLUCOSE BLOOD TEST: CPT | Performed by: ANESTHESIOLOGY

## 2021-03-24 PROCEDURE — 25500020 PHARM REV CODE 255: Performed by: ANESTHESIOLOGY

## 2021-03-24 PROCEDURE — 99213 PR OFFICE/OUTPT VISIT, EST, LEVL III, 20-29 MIN: ICD-10-PCS | Mod: S$GLB,,, | Performed by: STUDENT IN AN ORGANIZED HEALTH CARE EDUCATION/TRAINING PROGRAM

## 2021-03-24 PROCEDURE — 20552 NJX 1/MLT TRIGGER POINT 1/2: CPT | Mod: 50 | Performed by: ANESTHESIOLOGY

## 2021-03-24 PROCEDURE — 63600175 PHARM REV CODE 636 W HCPCS: Performed by: ANESTHESIOLOGY

## 2021-03-24 PROCEDURE — 27096 INJECT SACROILIAC JOINT: CPT | Mod: LT,,, | Performed by: ANESTHESIOLOGY

## 2021-03-24 PROCEDURE — 99999 PR PBB SHADOW E&M-EST. PATIENT-LVL IV: ICD-10-PCS | Mod: PBBFAC,,, | Performed by: STUDENT IN AN ORGANIZED HEALTH CARE EDUCATION/TRAINING PROGRAM

## 2021-03-24 PROCEDURE — 27096 INJECT SACROILIAC JOINT: CPT | Performed by: ANESTHESIOLOGY

## 2021-03-24 PROCEDURE — 3078F DIAST BP <80 MM HG: CPT | Mod: CPTII,S$GLB,, | Performed by: STUDENT IN AN ORGANIZED HEALTH CARE EDUCATION/TRAINING PROGRAM

## 2021-03-24 PROCEDURE — 99213 OFFICE O/P EST LOW 20 MIN: CPT | Mod: S$GLB,,, | Performed by: STUDENT IN AN ORGANIZED HEALTH CARE EDUCATION/TRAINING PROGRAM

## 2021-03-24 PROCEDURE — 27096 PR INJECTION,SACROILIAC JOINT: ICD-10-PCS | Mod: LT,,, | Performed by: ANESTHESIOLOGY

## 2021-03-24 PROCEDURE — 3074F PR MOST RECENT SYSTOLIC BLOOD PRESSURE < 130 MM HG: ICD-10-PCS | Mod: CPTII,S$GLB,, | Performed by: STUDENT IN AN ORGANIZED HEALTH CARE EDUCATION/TRAINING PROGRAM

## 2021-03-24 PROCEDURE — 3078F PR MOST RECENT DIASTOLIC BLOOD PRESSURE < 80 MM HG: ICD-10-PCS | Mod: CPTII,S$GLB,, | Performed by: STUDENT IN AN ORGANIZED HEALTH CARE EDUCATION/TRAINING PROGRAM

## 2021-03-24 PROCEDURE — 3074F SYST BP LT 130 MM HG: CPT | Mod: CPTII,S$GLB,, | Performed by: STUDENT IN AN ORGANIZED HEALTH CARE EDUCATION/TRAINING PROGRAM

## 2021-03-24 PROCEDURE — 1126F AMNT PAIN NOTED NONE PRSNT: CPT | Mod: S$GLB,,, | Performed by: STUDENT IN AN ORGANIZED HEALTH CARE EDUCATION/TRAINING PROGRAM

## 2021-03-24 PROCEDURE — 99999 PR PBB SHADOW E&M-EST. PATIENT-LVL IV: CPT | Mod: PBBFAC,,, | Performed by: STUDENT IN AN ORGANIZED HEALTH CARE EDUCATION/TRAINING PROGRAM

## 2021-03-24 RX ORDER — INSULIN GLARGINE 100 [IU]/ML
INJECTION, SOLUTION SUBCUTANEOUS
Qty: 75 ML | Refills: 4 | Status: SHIPPED | OUTPATIENT
Start: 2021-03-24 | End: 2021-11-11 | Stop reason: ALTCHOICE

## 2021-03-24 RX ORDER — COLCHICINE 0.6 MG/1
0.6 CAPSULE ORAL DAILY
Qty: 90 CAPSULE | Refills: 2 | Status: SHIPPED | OUTPATIENT
Start: 2021-03-24 | End: 2021-10-25 | Stop reason: SDUPTHER

## 2021-03-24 RX ORDER — AZATHIOPRINE 50 MG/1
50 TABLET ORAL DAILY
Qty: 90 TABLET | Refills: 0 | Status: SHIPPED | OUTPATIENT
Start: 2021-03-24 | End: 2021-05-14 | Stop reason: SDUPTHER

## 2021-03-24 RX ORDER — FUROSEMIDE 40 MG/1
TABLET ORAL
Qty: 270 TABLET | Refills: 0 | Status: SHIPPED | OUTPATIENT
Start: 2021-03-24 | End: 2021-05-10 | Stop reason: SDUPTHER

## 2021-03-24 RX ORDER — GLIMEPIRIDE 2 MG/1
2 TABLET ORAL
Qty: 90 TABLET | Refills: 3 | OUTPATIENT
Start: 2021-03-24 | End: 2021-06-29

## 2021-03-24 RX ORDER — FLUTICASONE PROPIONATE AND SALMETEROL 250; 50 UG/1; UG/1
1 POWDER RESPIRATORY (INHALATION) 2 TIMES DAILY
Qty: 60 EACH | Refills: 11 | Status: SHIPPED | OUTPATIENT
Start: 2021-03-24 | End: 2021-11-24 | Stop reason: SDUPTHER

## 2021-03-24 RX ORDER — LISINOPRIL 10 MG/1
10 TABLET ORAL DAILY
Qty: 90 TABLET | Refills: 4 | Status: SHIPPED | OUTPATIENT
Start: 2021-03-24 | End: 2021-08-03 | Stop reason: SDUPTHER

## 2021-03-24 RX ORDER — DOXYCYCLINE 100 MG/1
100 CAPSULE ORAL DAILY
Qty: 90 CAPSULE | Refills: 0 | Status: ON HOLD | OUTPATIENT
Start: 2021-03-24 | End: 2021-03-24 | Stop reason: HOSPADM

## 2021-03-24 RX ORDER — METHYLPREDNISOLONE ACETATE 40 MG/ML
INJECTION, SUSPENSION INTRA-ARTICULAR; INTRALESIONAL; INTRAMUSCULAR; SOFT TISSUE
Status: DISCONTINUED | OUTPATIENT
Start: 2021-03-24 | End: 2021-03-24 | Stop reason: HOSPADM

## 2021-03-24 RX ORDER — GLIMEPIRIDE 2 MG/1
2 TABLET ORAL
Qty: 90 TABLET | Refills: 3 | Status: ON HOLD | OUTPATIENT
Start: 2021-03-24 | End: 2021-03-24 | Stop reason: SDUPTHER

## 2021-03-24 RX ORDER — PANTOPRAZOLE SODIUM 40 MG/1
40 TABLET, DELAYED RELEASE ORAL DAILY
Qty: 90 TABLET | Refills: 3 | Status: SHIPPED | OUTPATIENT
Start: 2021-03-24 | End: 2021-08-03 | Stop reason: SDUPTHER

## 2021-03-24 RX ORDER — LIDOCAINE HYDROCHLORIDE 20 MG/ML
INJECTION, SOLUTION EPIDURAL; INFILTRATION; INTRACAUDAL; PERINEURAL
Status: DISCONTINUED | OUTPATIENT
Start: 2021-03-24 | End: 2021-03-24 | Stop reason: HOSPADM

## 2021-03-24 RX ORDER — FENTANYL CITRATE 50 UG/ML
INJECTION, SOLUTION INTRAMUSCULAR; INTRAVENOUS
Status: DISCONTINUED | OUTPATIENT
Start: 2021-03-24 | End: 2021-03-24 | Stop reason: HOSPADM

## 2021-03-24 RX ORDER — ATORVASTATIN CALCIUM 40 MG/1
40 TABLET, FILM COATED ORAL NIGHTLY
Qty: 90 TABLET | Refills: 4 | Status: SHIPPED | OUTPATIENT
Start: 2021-03-24 | End: 2022-06-05

## 2021-03-24 RX ORDER — GABAPENTIN 800 MG/1
800 TABLET ORAL 3 TIMES DAILY
Qty: 270 TABLET | Refills: 4 | Status: SHIPPED | OUTPATIENT
Start: 2021-03-24 | End: 2022-06-08 | Stop reason: SDUPTHER

## 2021-03-24 RX ORDER — ALLOPURINOL 100 MG/1
100 TABLET ORAL DAILY
Qty: 90 TABLET | Refills: 3 | Status: SHIPPED | OUTPATIENT
Start: 2021-03-24 | End: 2022-06-09 | Stop reason: SDUPTHER

## 2021-03-24 RX ORDER — MIDAZOLAM HYDROCHLORIDE 1 MG/ML
INJECTION, SOLUTION INTRAMUSCULAR; INTRAVENOUS
Status: DISCONTINUED | OUTPATIENT
Start: 2021-03-24 | End: 2021-03-24 | Stop reason: HOSPADM

## 2021-03-24 RX ORDER — INSULIN GLARGINE 100 [IU]/ML
INJECTION, SOLUTION SUBCUTANEOUS
Qty: 75 ML | Refills: 4 | Status: ON HOLD
Start: 2021-03-24 | End: 2021-03-24 | Stop reason: SDUPTHER

## 2021-03-24 RX ORDER — TRAZODONE HYDROCHLORIDE 100 MG/1
200 TABLET ORAL NIGHTLY
Qty: 180 TABLET | Refills: 1 | Status: SHIPPED | OUTPATIENT
Start: 2021-03-24 | End: 2021-11-11

## 2021-03-24 RX ORDER — ASPIRIN 81 MG/1
81 TABLET ORAL DAILY
Refills: 0 | COMMUNITY
Start: 2021-03-24 | End: 2022-03-05

## 2021-03-24 RX ORDER — SEMAGLUTIDE 1.34 MG/ML
0.75 INJECTION, SOLUTION SUBCUTANEOUS WEEKLY
Qty: 3 ML | Refills: 11 | Status: SHIPPED | OUTPATIENT
Start: 2021-03-24 | End: 2022-06-09 | Stop reason: SDUPTHER

## 2021-03-24 RX ORDER — TAMSULOSIN HYDROCHLORIDE 0.4 MG/1
0.4 CAPSULE ORAL DAILY
Qty: 30 CAPSULE | Refills: 11 | Status: SHIPPED | OUTPATIENT
Start: 2021-03-24 | End: 2022-06-09 | Stop reason: SDUPTHER

## 2021-03-24 RX ORDER — ISOSORBIDE MONONITRATE 60 MG/1
60 TABLET, EXTENDED RELEASE ORAL DAILY
Qty: 90 TABLET | Refills: 3 | Status: SHIPPED | OUTPATIENT
Start: 2021-03-24 | End: 2021-07-07 | Stop reason: SDUPTHER

## 2021-03-24 RX ORDER — APIXABAN 5 MG/1
5 TABLET, FILM COATED ORAL 2 TIMES DAILY
Qty: 60 TABLET | Refills: 11 | Status: SHIPPED | OUTPATIENT
Start: 2021-03-24 | End: 2021-08-03 | Stop reason: SDUPTHER

## 2021-03-24 RX ORDER — SERTRALINE HYDROCHLORIDE 100 MG/1
100 TABLET, FILM COATED ORAL NIGHTLY
Qty: 90 TABLET | Refills: 4 | Status: SHIPPED | OUTPATIENT
Start: 2021-03-24 | End: 2021-08-23

## 2021-03-24 RX ORDER — METOPROLOL SUCCINATE 50 MG/1
50 TABLET, EXTENDED RELEASE ORAL DAILY
Qty: 90 TABLET | Refills: 3 | Status: SHIPPED | OUTPATIENT
Start: 2021-03-24 | End: 2021-08-03 | Stop reason: SDUPTHER

## 2021-03-24 RX ORDER — INSULIN ASPART 100 [IU]/ML
INJECTION, SOLUTION INTRAVENOUS; SUBCUTANEOUS
Qty: 15 ML | Refills: 0 | Status: ON HOLD
Start: 2021-03-24 | End: 2021-03-24 | Stop reason: SDUPTHER

## 2021-03-24 RX ORDER — INSULIN ASPART 100 [IU]/ML
INJECTION, SOLUTION INTRAVENOUS; SUBCUTANEOUS
Qty: 15 ML | Refills: 0 | Status: SHIPPED | OUTPATIENT
Start: 2021-03-24 | End: 2021-08-18 | Stop reason: SDUPTHER

## 2021-03-24 RX ORDER — METFORMIN HYDROCHLORIDE 500 MG/1
1000 TABLET, EXTENDED RELEASE ORAL 2 TIMES DAILY WITH MEALS
Qty: 360 TABLET | Refills: 3 | Status: SHIPPED | OUTPATIENT
Start: 2021-03-24 | End: 2021-11-11

## 2021-03-25 LAB — POCT GLUCOSE: 240 MG/DL (ref 70–110)

## 2021-03-30 ENCOUNTER — CLINICAL SUPPORT (OUTPATIENT)
Dept: INTERNAL MEDICINE | Facility: CLINIC | Age: 66
End: 2021-03-30
Payer: MEDICARE

## 2021-04-05 ENCOUNTER — PATIENT OUTREACH (OUTPATIENT)
Dept: ADMINISTRATIVE | Facility: OTHER | Age: 66
End: 2021-04-05

## 2021-04-06 ENCOUNTER — OFFICE VISIT (OUTPATIENT)
Dept: OPHTHALMOLOGY | Facility: CLINIC | Age: 66
End: 2021-04-06
Payer: MEDICARE

## 2021-04-06 ENCOUNTER — TELEPHONE (OUTPATIENT)
Dept: PRIMARY CARE CLINIC | Facility: CLINIC | Age: 66
End: 2021-04-06

## 2021-04-06 DIAGNOSIS — H02.59 FLOPPY EYELID SYNDROME OF BOTH EYES: Primary | ICD-10-CM

## 2021-04-06 DIAGNOSIS — M35.01 KERATOCONJUNCTIVITIS SICCA: ICD-10-CM

## 2021-04-06 PROCEDURE — 92002 PR EYE EXAM, NEW PATIENT,INTERMED: ICD-10-PCS | Mod: S$GLB,,, | Performed by: STUDENT IN AN ORGANIZED HEALTH CARE EDUCATION/TRAINING PROGRAM

## 2021-04-06 PROCEDURE — 99999 PR PBB SHADOW E&M-EST. PATIENT-LVL III: CPT | Mod: PBBFAC,,, | Performed by: STUDENT IN AN ORGANIZED HEALTH CARE EDUCATION/TRAINING PROGRAM

## 2021-04-06 PROCEDURE — 92002 INTRM OPH EXAM NEW PATIENT: CPT | Mod: S$GLB,,, | Performed by: STUDENT IN AN ORGANIZED HEALTH CARE EDUCATION/TRAINING PROGRAM

## 2021-04-06 PROCEDURE — 99499 UNLISTED E&M SERVICE: CPT | Mod: S$PBB,,, | Performed by: STUDENT IN AN ORGANIZED HEALTH CARE EDUCATION/TRAINING PROGRAM

## 2021-04-06 PROCEDURE — 99499 RISK ADDL DX/OHS AUDIT: ICD-10-PCS | Mod: S$PBB,,, | Performed by: STUDENT IN AN ORGANIZED HEALTH CARE EDUCATION/TRAINING PROGRAM

## 2021-04-06 PROCEDURE — 99999 PR PBB SHADOW E&M-EST. PATIENT-LVL III: ICD-10-PCS | Mod: PBBFAC,,, | Performed by: STUDENT IN AN ORGANIZED HEALTH CARE EDUCATION/TRAINING PROGRAM

## 2021-04-07 ENCOUNTER — CLINICAL SUPPORT (OUTPATIENT)
Dept: PRIMARY CARE CLINIC | Facility: CLINIC | Age: 66
End: 2021-04-07
Payer: MEDICARE

## 2021-04-07 DIAGNOSIS — F33.1 MDD (MAJOR DEPRESSIVE DISORDER), RECURRENT EPISODE, MODERATE: Primary | ICD-10-CM

## 2021-04-07 DIAGNOSIS — G89.29 OTHER CHRONIC PAIN: ICD-10-CM

## 2021-04-07 DIAGNOSIS — F41.1 GAD (GENERALIZED ANXIETY DISORDER): ICD-10-CM

## 2021-04-07 PROCEDURE — 99499 NO LOS: ICD-10-PCS | Mod: BHI,95,, | Performed by: SOCIAL WORKER

## 2021-04-07 PROCEDURE — 99499 UNLISTED E&M SERVICE: CPT | Mod: BHI,95,, | Performed by: SOCIAL WORKER

## 2021-04-08 ENCOUNTER — TELEPHONE (OUTPATIENT)
Dept: ADMINISTRATIVE | Facility: HOSPITAL | Age: 66
End: 2021-04-08

## 2021-04-14 ENCOUNTER — TELEPHONE (OUTPATIENT)
Dept: PAIN MEDICINE | Facility: CLINIC | Age: 66
End: 2021-04-14

## 2021-04-16 ENCOUNTER — TELEPHONE (OUTPATIENT)
Dept: PRIMARY CARE CLINIC | Facility: CLINIC | Age: 66
End: 2021-04-16

## 2021-04-19 ENCOUNTER — HOSPITAL ENCOUNTER (EMERGENCY)
Facility: HOSPITAL | Age: 66
Discharge: HOME | End: 2021-04-19
Attending: EMERGENCY MEDICINE
Payer: MEDICARE

## 2021-04-19 ENCOUNTER — TELEPHONE (OUTPATIENT)
Dept: PAIN MEDICINE | Facility: CLINIC | Age: 66
End: 2021-04-19

## 2021-04-19 VITALS
RESPIRATION RATE: 18 BRPM | OXYGEN SATURATION: 97 % | WEIGHT: 180 LBS | SYSTOLIC BLOOD PRESSURE: 168 MMHG | TEMPERATURE: 98 F | DIASTOLIC BLOOD PRESSURE: 82 MMHG | BODY MASS INDEX: 29.95 KG/M2 | HEART RATE: 85 BPM

## 2021-04-19 DIAGNOSIS — M25.512 CHRONIC LEFT SHOULDER PAIN: Primary | ICD-10-CM

## 2021-04-19 DIAGNOSIS — G89.29 CHRONIC LEFT SHOULDER PAIN: Primary | ICD-10-CM

## 2021-04-19 PROCEDURE — 25000003 PHARM REV CODE 250: Performed by: REGISTERED NURSE

## 2021-04-19 PROCEDURE — 99283 EMERGENCY DEPT VISIT LOW MDM: CPT

## 2021-04-19 RX ORDER — HYDROCODONE BITARTRATE AND ACETAMINOPHEN 10; 325 MG/1; MG/1
1 TABLET ORAL
Status: COMPLETED | OUTPATIENT
Start: 2021-04-19 | End: 2021-04-19

## 2021-04-19 RX ORDER — HYDROCODONE BITARTRATE AND ACETAMINOPHEN 7.5; 325 MG/1; MG/1
1 TABLET ORAL EVERY 6 HOURS PRN
Qty: 12 TABLET | Refills: 0 | Status: ON HOLD | OUTPATIENT
Start: 2021-04-19 | End: 2021-05-25 | Stop reason: SDUPTHER

## 2021-04-19 RX ADMIN — HYDROCODONE BITARTRATE AND ACETAMINOPHEN 1 TABLET: 10; 325 TABLET ORAL at 01:04

## 2021-04-20 ENCOUNTER — OFFICE VISIT (OUTPATIENT)
Dept: PAIN MEDICINE | Facility: CLINIC | Age: 66
End: 2021-04-20
Payer: MEDICARE

## 2021-04-20 VITALS
DIASTOLIC BLOOD PRESSURE: 89 MMHG | WEIGHT: 179.88 LBS | SYSTOLIC BLOOD PRESSURE: 133 MMHG | HEART RATE: 92 BPM | BODY MASS INDEX: 29.97 KG/M2 | HEIGHT: 65 IN

## 2021-04-20 DIAGNOSIS — M53.3 SACROILIAC JOINT PAIN: ICD-10-CM

## 2021-04-20 DIAGNOSIS — M75.102 ROTATOR CUFF TEAR ARTHROPATHY OF LEFT SHOULDER: ICD-10-CM

## 2021-04-20 DIAGNOSIS — M12.812 ROTATOR CUFF TEAR ARTHROPATHY OF LEFT SHOULDER: ICD-10-CM

## 2021-04-20 DIAGNOSIS — M47.816 LUMBAR SPONDYLOSIS: ICD-10-CM

## 2021-04-20 DIAGNOSIS — M54.16 LUMBAR RADICULOPATHY: ICD-10-CM

## 2021-04-20 DIAGNOSIS — M79.18 MYOFASCIAL MUSCLE PAIN: Primary | ICD-10-CM

## 2021-04-20 PROCEDURE — 20552 PR INJECT TRIGGER POINT, 1 OR 2: ICD-10-PCS | Mod: S$GLB,,, | Performed by: ANESTHESIOLOGY

## 2021-04-20 PROCEDURE — 3008F BODY MASS INDEX DOCD: CPT | Mod: CPTII,S$GLB,, | Performed by: ANESTHESIOLOGY

## 2021-04-20 PROCEDURE — 1125F AMNT PAIN NOTED PAIN PRSNT: CPT | Mod: S$GLB,,, | Performed by: ANESTHESIOLOGY

## 2021-04-20 PROCEDURE — 99214 PR OFFICE/OUTPT VISIT, EST, LEVL IV, 30-39 MIN: ICD-10-PCS | Mod: 25,S$GLB,, | Performed by: ANESTHESIOLOGY

## 2021-04-20 PROCEDURE — 3008F PR BODY MASS INDEX (BMI) DOCUMENTED: ICD-10-PCS | Mod: CPTII,S$GLB,, | Performed by: ANESTHESIOLOGY

## 2021-04-20 PROCEDURE — 20552 NJX 1/MLT TRIGGER POINT 1/2: CPT | Mod: S$GLB,,, | Performed by: ANESTHESIOLOGY

## 2021-04-20 PROCEDURE — 99999 PR PBB SHADOW E&M-EST. PATIENT-LVL V: CPT | Mod: PBBFAC,,, | Performed by: ANESTHESIOLOGY

## 2021-04-20 PROCEDURE — 1125F PR PAIN SEVERITY QUANTIFIED, PAIN PRESENT: ICD-10-PCS | Mod: S$GLB,,, | Performed by: ANESTHESIOLOGY

## 2021-04-20 PROCEDURE — 99214 OFFICE O/P EST MOD 30 MIN: CPT | Mod: 25,S$GLB,, | Performed by: ANESTHESIOLOGY

## 2021-04-20 PROCEDURE — 99999 PR PBB SHADOW E&M-EST. PATIENT-LVL V: ICD-10-PCS | Mod: PBBFAC,,, | Performed by: ANESTHESIOLOGY

## 2021-04-20 RX ORDER — METHYLPREDNISOLONE ACETATE 40 MG/ML
40 INJECTION, SUSPENSION INTRA-ARTICULAR; INTRALESIONAL; INTRAMUSCULAR; SOFT TISSUE
Status: COMPLETED | OUTPATIENT
Start: 2021-04-20 | End: 2021-04-20

## 2021-04-20 RX ADMIN — METHYLPREDNISOLONE ACETATE 40 MG: 40 INJECTION, SUSPENSION INTRA-ARTICULAR; INTRALESIONAL; INTRAMUSCULAR; SOFT TISSUE at 10:04

## 2021-04-22 ENCOUNTER — TELEPHONE (OUTPATIENT)
Dept: PAIN MEDICINE | Facility: CLINIC | Age: 66
End: 2021-04-22

## 2021-04-22 RX ORDER — TIZANIDINE HYDROCHLORIDE 6 MG/1
6 CAPSULE, GELATIN COATED ORAL 3 TIMES DAILY PRN
Qty: 30 CAPSULE | Refills: 0 | Status: SHIPPED | OUTPATIENT
Start: 2021-04-22 | End: 2021-04-23

## 2021-04-23 ENCOUNTER — TELEPHONE (OUTPATIENT)
Dept: PAIN MEDICINE | Facility: CLINIC | Age: 66
End: 2021-04-23

## 2021-04-23 RX ORDER — BACLOFEN 10 MG/1
10 TABLET ORAL NIGHTLY PRN
Qty: 30 TABLET | Refills: 0 | Status: SHIPPED | OUTPATIENT
Start: 2021-04-23 | End: 2021-06-23

## 2021-04-26 ENCOUNTER — TELEPHONE (OUTPATIENT)
Dept: PRIMARY CARE CLINIC | Facility: CLINIC | Age: 66
End: 2021-04-26

## 2021-04-26 ENCOUNTER — TELEPHONE (OUTPATIENT)
Dept: INTERNAL MEDICINE | Facility: CLINIC | Age: 66
End: 2021-04-26

## 2021-04-28 ENCOUNTER — CLINICAL SUPPORT (OUTPATIENT)
Dept: INTERNAL MEDICINE | Facility: CLINIC | Age: 66
End: 2021-04-28
Payer: MEDICARE

## 2021-04-28 ENCOUNTER — TELEPHONE (OUTPATIENT)
Dept: PAIN MEDICINE | Facility: CLINIC | Age: 66
End: 2021-04-28

## 2021-04-28 ENCOUNTER — PATIENT OUTREACH (OUTPATIENT)
Dept: ADMINISTRATIVE | Facility: HOSPITAL | Age: 66
End: 2021-04-28

## 2021-04-28 ENCOUNTER — CLINICAL SUPPORT (OUTPATIENT)
Dept: PRIMARY CARE CLINIC | Facility: CLINIC | Age: 66
End: 2021-04-28
Payer: MEDICARE

## 2021-04-28 ENCOUNTER — TELEPHONE (OUTPATIENT)
Dept: ORTHOPEDICS | Facility: CLINIC | Age: 66
End: 2021-04-28

## 2021-04-28 DIAGNOSIS — M25.512 CHRONIC LEFT SHOULDER PAIN: ICD-10-CM

## 2021-04-28 DIAGNOSIS — F41.1 GAD (GENERALIZED ANXIETY DISORDER): ICD-10-CM

## 2021-04-28 DIAGNOSIS — F33.1 MDD (MAJOR DEPRESSIVE DISORDER), RECURRENT EPISODE, MODERATE: Primary | ICD-10-CM

## 2021-04-28 DIAGNOSIS — G89.29 OTHER CHRONIC PAIN: ICD-10-CM

## 2021-04-28 DIAGNOSIS — G89.29 CHRONIC LEFT SHOULDER PAIN: ICD-10-CM

## 2021-04-28 PROCEDURE — 90834 PSYTX W PT 45 MINUTES: CPT | Mod: BHI,95,, | Performed by: SOCIAL WORKER

## 2021-04-28 PROCEDURE — 90834 PR PSYCHOTHERAPY W/PATIENT, 45 MIN: ICD-10-PCS | Mod: BHI,95,, | Performed by: SOCIAL WORKER

## 2021-04-30 ENCOUNTER — HOSPITAL ENCOUNTER (OUTPATIENT)
Dept: CARDIOLOGY | Facility: HOSPITAL | Age: 66
Discharge: HOME | End: 2021-04-30
Attending: STUDENT IN AN ORGANIZED HEALTH CARE EDUCATION/TRAINING PROGRAM
Payer: MEDICARE

## 2021-04-30 ENCOUNTER — OFFICE VISIT (OUTPATIENT)
Dept: ORTHOPEDICS | Facility: CLINIC | Age: 66
End: 2021-04-30
Payer: MEDICARE

## 2021-04-30 VITALS — HEIGHT: 65 IN | WEIGHT: 179 LBS | BODY MASS INDEX: 29.82 KG/M2

## 2021-04-30 DIAGNOSIS — E11.69 TYPE 2 DIABETES MELLITUS WITH OTHER SPECIFIED COMPLICATION, WITHOUT LONG-TERM CURRENT USE OF INSULIN: ICD-10-CM

## 2021-04-30 DIAGNOSIS — M75.102 ROTATOR CUFF TEAR ARTHROPATHY OF LEFT SHOULDER: Primary | ICD-10-CM

## 2021-04-30 DIAGNOSIS — M12.812 ROTATOR CUFF TEAR ARTHROPATHY OF LEFT SHOULDER: ICD-10-CM

## 2021-04-30 DIAGNOSIS — Z01.812 PRE-PROCEDURE LAB EXAM: ICD-10-CM

## 2021-04-30 DIAGNOSIS — M75.102 ROTATOR CUFF TEAR ARTHROPATHY OF LEFT SHOULDER: ICD-10-CM

## 2021-04-30 DIAGNOSIS — M12.812 ROTATOR CUFF TEAR ARTHROPATHY OF LEFT SHOULDER: Primary | ICD-10-CM

## 2021-04-30 PROCEDURE — 99999 PR PBB SHADOW E&M-EST. PATIENT-LVL IV: ICD-10-PCS | Mod: PBBFAC,,, | Performed by: STUDENT IN AN ORGANIZED HEALTH CARE EDUCATION/TRAINING PROGRAM

## 2021-04-30 PROCEDURE — 99213 PR OFFICE/OUTPT VISIT, EST, LEVL III, 20-29 MIN: ICD-10-PCS | Mod: S$GLB,,, | Performed by: STUDENT IN AN ORGANIZED HEALTH CARE EDUCATION/TRAINING PROGRAM

## 2021-04-30 PROCEDURE — 1125F AMNT PAIN NOTED PAIN PRSNT: CPT | Mod: S$GLB,,, | Performed by: STUDENT IN AN ORGANIZED HEALTH CARE EDUCATION/TRAINING PROGRAM

## 2021-04-30 PROCEDURE — 93005 ELECTROCARDIOGRAM TRACING: CPT

## 2021-04-30 PROCEDURE — 99213 OFFICE O/P EST LOW 20 MIN: CPT | Mod: S$GLB,,, | Performed by: STUDENT IN AN ORGANIZED HEALTH CARE EDUCATION/TRAINING PROGRAM

## 2021-04-30 PROCEDURE — 1125F PR PAIN SEVERITY QUANTIFIED, PAIN PRESENT: ICD-10-PCS | Mod: S$GLB,,, | Performed by: STUDENT IN AN ORGANIZED HEALTH CARE EDUCATION/TRAINING PROGRAM

## 2021-04-30 PROCEDURE — 93010 EKG 12-LEAD: ICD-10-PCS | Mod: ,,, | Performed by: INTERNAL MEDICINE

## 2021-04-30 PROCEDURE — 3008F PR BODY MASS INDEX (BMI) DOCUMENTED: ICD-10-PCS | Mod: CPTII,S$GLB,, | Performed by: STUDENT IN AN ORGANIZED HEALTH CARE EDUCATION/TRAINING PROGRAM

## 2021-04-30 PROCEDURE — 99999 PR PBB SHADOW E&M-EST. PATIENT-LVL IV: CPT | Mod: PBBFAC,,, | Performed by: STUDENT IN AN ORGANIZED HEALTH CARE EDUCATION/TRAINING PROGRAM

## 2021-04-30 PROCEDURE — 93010 ELECTROCARDIOGRAM REPORT: CPT | Mod: ,,, | Performed by: INTERNAL MEDICINE

## 2021-04-30 PROCEDURE — 3008F BODY MASS INDEX DOCD: CPT | Mod: CPTII,S$GLB,, | Performed by: STUDENT IN AN ORGANIZED HEALTH CARE EDUCATION/TRAINING PROGRAM

## 2021-05-03 ENCOUNTER — CLINICAL SUPPORT (OUTPATIENT)
Dept: REHABILITATION | Facility: HOSPITAL | Age: 66
End: 2021-05-03
Attending: ANESTHESIOLOGY
Payer: MEDICARE

## 2021-05-03 DIAGNOSIS — M12.812 ROTATOR CUFF TEAR ARTHROPATHY OF LEFT SHOULDER: ICD-10-CM

## 2021-05-03 DIAGNOSIS — R53.1 GENERALIZED WEAKNESS: ICD-10-CM

## 2021-05-03 DIAGNOSIS — M47.816 LUMBAR SPONDYLOSIS: ICD-10-CM

## 2021-05-03 DIAGNOSIS — M75.102 ROTATOR CUFF TEAR ARTHROPATHY OF LEFT SHOULDER: ICD-10-CM

## 2021-05-03 DIAGNOSIS — M79.18 MYOFASCIAL MUSCLE PAIN: ICD-10-CM

## 2021-05-03 PROCEDURE — 97161 PT EVAL LOW COMPLEX 20 MIN: CPT

## 2021-05-07 ENCOUNTER — PATIENT OUTREACH (OUTPATIENT)
Dept: ADMINISTRATIVE | Facility: HOSPITAL | Age: 66
End: 2021-05-07

## 2021-05-07 RX ORDER — LATANOPROST 50 UG/ML
1 SOLUTION/ DROPS OPHTHALMIC DAILY
Qty: 7.5 ML | Refills: 1 | Status: SHIPPED | OUTPATIENT
Start: 2021-05-07 | End: 2021-10-19

## 2021-05-10 ENCOUNTER — PATIENT OUTREACH (OUTPATIENT)
Dept: ADMINISTRATIVE | Facility: OTHER | Age: 66
End: 2021-05-10

## 2021-05-10 ENCOUNTER — TELEPHONE (OUTPATIENT)
Dept: REHABILITATION | Facility: HOSPITAL | Age: 66
End: 2021-05-10

## 2021-05-10 RX ORDER — FUROSEMIDE 40 MG/1
TABLET ORAL
Qty: 270 TABLET | Refills: 0 | Status: SHIPPED | OUTPATIENT
Start: 2021-05-10 | End: 2021-07-02 | Stop reason: SDUPTHER

## 2021-05-11 ENCOUNTER — TELEPHONE (OUTPATIENT)
Dept: INTERNAL MEDICINE | Facility: CLINIC | Age: 66
End: 2021-05-11

## 2021-05-11 ENCOUNTER — OFFICE VISIT (OUTPATIENT)
Dept: OPHTHALMOLOGY | Facility: CLINIC | Age: 66
End: 2021-05-11
Payer: MEDICARE

## 2021-05-11 ENCOUNTER — TELEPHONE (OUTPATIENT)
Dept: PRIMARY CARE CLINIC | Facility: CLINIC | Age: 66
End: 2021-05-11

## 2021-05-11 DIAGNOSIS — Z79.4 TYPE 2 DIABETES MELLITUS WITH BOTH EYES AFFECTED BY MILD NONPROLIFERATIVE RETINOPATHY AND MACULAR EDEMA, WITH LONG-TERM CURRENT USE OF INSULIN: Primary | ICD-10-CM

## 2021-05-11 DIAGNOSIS — H40.1132 PRIMARY OPEN ANGLE GLAUCOMA (POAG) OF BOTH EYES, MODERATE STAGE: ICD-10-CM

## 2021-05-11 DIAGNOSIS — E11.36 DIABETIC CATARACT: ICD-10-CM

## 2021-05-11 DIAGNOSIS — H02.59 FLOPPY EYELID SYNDROME OF BOTH EYES: ICD-10-CM

## 2021-05-11 DIAGNOSIS — E11.3213 TYPE 2 DIABETES MELLITUS WITH BOTH EYES AFFECTED BY MILD NONPROLIFERATIVE RETINOPATHY AND MACULAR EDEMA, WITH LONG-TERM CURRENT USE OF INSULIN: Primary | ICD-10-CM

## 2021-05-11 PROCEDURE — 99999 PR PBB SHADOW E&M-EST. PATIENT-LVL III: ICD-10-PCS | Mod: PBBFAC,,, | Performed by: OPHTHALMOLOGY

## 2021-05-11 PROCEDURE — 92134 POSTERIOR SEGMENT OCT RETINA (OCULAR COHERENCE TOMOGRAPHY)-BOTH EYES: ICD-10-PCS | Mod: S$GLB,,, | Performed by: OPHTHALMOLOGY

## 2021-05-11 PROCEDURE — 1126F PR PAIN SEVERITY QUANTIFIED, NO PAIN PRESENT: ICD-10-PCS | Mod: S$GLB,,, | Performed by: OPHTHALMOLOGY

## 2021-05-11 PROCEDURE — 3052F HG A1C>EQUAL 8.0%<EQUAL 9.0%: CPT | Mod: CPTII,S$GLB,, | Performed by: OPHTHALMOLOGY

## 2021-05-11 PROCEDURE — 92134 CPTRZ OPH DX IMG PST SGM RTA: CPT | Mod: S$GLB,,, | Performed by: OPHTHALMOLOGY

## 2021-05-11 PROCEDURE — 99213 PR OFFICE/OUTPT VISIT, EST, LEVL III, 20-29 MIN: ICD-10-PCS | Mod: S$GLB,,, | Performed by: OPHTHALMOLOGY

## 2021-05-11 PROCEDURE — 99213 OFFICE O/P EST LOW 20 MIN: CPT | Mod: S$GLB,,, | Performed by: OPHTHALMOLOGY

## 2021-05-11 PROCEDURE — 99999 PR PBB SHADOW E&M-EST. PATIENT-LVL III: CPT | Mod: PBBFAC,,, | Performed by: OPHTHALMOLOGY

## 2021-05-11 PROCEDURE — 3052F PR MOST RECENT HEMOGLOBIN A1C LEVEL 8.0 - < 9.0%: ICD-10-PCS | Mod: CPTII,S$GLB,, | Performed by: OPHTHALMOLOGY

## 2021-05-11 PROCEDURE — 1126F AMNT PAIN NOTED NONE PRSNT: CPT | Mod: S$GLB,,, | Performed by: OPHTHALMOLOGY

## 2021-05-12 ENCOUNTER — CLINICAL SUPPORT (OUTPATIENT)
Dept: PRIMARY CARE CLINIC | Facility: CLINIC | Age: 66
End: 2021-05-12
Payer: MEDICARE

## 2021-05-12 DIAGNOSIS — F41.1 GAD (GENERALIZED ANXIETY DISORDER): ICD-10-CM

## 2021-05-12 DIAGNOSIS — G89.29 OTHER CHRONIC PAIN: ICD-10-CM

## 2021-05-12 DIAGNOSIS — G89.29 CHRONIC LEFT SHOULDER PAIN: ICD-10-CM

## 2021-05-12 DIAGNOSIS — M25.512 CHRONIC LEFT SHOULDER PAIN: ICD-10-CM

## 2021-05-12 DIAGNOSIS — F33.1 MDD (MAJOR DEPRESSIVE DISORDER), RECURRENT EPISODE, MODERATE: Primary | ICD-10-CM

## 2021-05-12 PROCEDURE — 90834 PR PSYCHOTHERAPY W/PATIENT, 45 MIN: ICD-10-PCS | Mod: BHI,95,, | Performed by: SOCIAL WORKER

## 2021-05-12 PROCEDURE — 90834 PSYTX W PT 45 MINUTES: CPT | Mod: BHI,95,, | Performed by: SOCIAL WORKER

## 2021-05-14 ENCOUNTER — CLINICAL SUPPORT (OUTPATIENT)
Dept: INTERNAL MEDICINE | Facility: CLINIC | Age: 66
End: 2021-05-14
Payer: MEDICARE

## 2021-05-14 DIAGNOSIS — M33.13 DERMATOMYOSITIS: ICD-10-CM

## 2021-05-14 RX ORDER — AZATHIOPRINE 50 MG/1
50 TABLET ORAL DAILY
Qty: 90 TABLET | Refills: 0 | Status: SHIPPED | OUTPATIENT
Start: 2021-05-14 | End: 2021-06-21 | Stop reason: SDUPTHER

## 2021-05-17 ENCOUNTER — TELEPHONE (OUTPATIENT)
Dept: REHABILITATION | Facility: HOSPITAL | Age: 66
End: 2021-05-17

## 2021-05-18 DIAGNOSIS — H40.1134 PRIMARY OPEN ANGLE GLAUCOMA OF BOTH EYES, INDETERMINATE STAGE: ICD-10-CM

## 2021-05-18 DIAGNOSIS — H40.1132 PRIMARY OPEN ANGLE GLAUCOMA (POAG) OF BOTH EYES, MODERATE STAGE: ICD-10-CM

## 2021-05-18 RX ORDER — LATANOPROST 50 UG/ML
1 SOLUTION/ DROPS OPHTHALMIC NIGHTLY
Qty: 7.5 ML | Refills: 6 | Status: ON HOLD | OUTPATIENT
Start: 2021-05-18 | End: 2021-06-18 | Stop reason: HOSPADM

## 2021-05-19 ENCOUNTER — TELEPHONE (OUTPATIENT)
Dept: PAIN MEDICINE | Facility: CLINIC | Age: 66
End: 2021-05-19

## 2021-05-21 ENCOUNTER — TELEPHONE (OUTPATIENT)
Dept: INTERNAL MEDICINE | Facility: CLINIC | Age: 66
End: 2021-05-21

## 2021-05-24 ENCOUNTER — HOSPITAL ENCOUNTER (OUTPATIENT)
Dept: RADIOLOGY | Facility: HOSPITAL | Age: 66
Discharge: HOME | End: 2021-05-24
Attending: NURSE PRACTITIONER
Payer: MEDICARE

## 2021-05-24 ENCOUNTER — HOSPITAL ENCOUNTER (OUTPATIENT)
Facility: HOSPITAL | Age: 66
Discharge: HOME | End: 2021-05-25
Attending: EMERGENCY MEDICINE | Admitting: INTERNAL MEDICINE
Payer: MEDICARE

## 2021-05-24 ENCOUNTER — HOSPITAL ENCOUNTER (OUTPATIENT)
Dept: CARDIOLOGY | Facility: HOSPITAL | Age: 66
Discharge: HOME | End: 2021-05-24
Attending: FAMILY MEDICINE
Payer: MEDICARE

## 2021-05-24 ENCOUNTER — OFFICE VISIT (OUTPATIENT)
Dept: INTERNAL MEDICINE | Facility: CLINIC | Age: 66
End: 2021-05-24
Payer: MEDICARE

## 2021-05-24 VITALS
HEART RATE: 79 BPM | DIASTOLIC BLOOD PRESSURE: 62 MMHG | TEMPERATURE: 99 F | HEIGHT: 65 IN | BODY MASS INDEX: 33.28 KG/M2 | SYSTOLIC BLOOD PRESSURE: 136 MMHG | OXYGEN SATURATION: 95 % | WEIGHT: 199.75 LBS

## 2021-05-24 DIAGNOSIS — Z01.818 PRE-OP EVALUATION: Primary | ICD-10-CM

## 2021-05-24 DIAGNOSIS — I21.4 NSTEMI (NON-ST ELEVATION MYOCARDIAL INFARCTION): Primary | ICD-10-CM

## 2021-05-24 DIAGNOSIS — I50.42 CHRONIC COMBINED SYSTOLIC AND DIASTOLIC CONGESTIVE HEART FAILURE: ICD-10-CM

## 2021-05-24 DIAGNOSIS — Z01.818 PRE-OP EVALUATION: ICD-10-CM

## 2021-05-24 DIAGNOSIS — E11.621 DIABETIC ULCER OF TOE OF LEFT FOOT ASSOCIATED WITH TYPE 2 DIABETES MELLITUS, WITH NECROSIS OF MUSCLE: ICD-10-CM

## 2021-05-24 DIAGNOSIS — E11.69 HYPERLIPIDEMIA ASSOCIATED WITH TYPE 2 DIABETES MELLITUS: ICD-10-CM

## 2021-05-24 DIAGNOSIS — I50.32 CHRONIC DIASTOLIC HEART FAILURE: ICD-10-CM

## 2021-05-24 DIAGNOSIS — N18.30 STAGE 3 CHRONIC KIDNEY DISEASE, UNSPECIFIED WHETHER STAGE 3A OR 3B CKD: ICD-10-CM

## 2021-05-24 DIAGNOSIS — I48.0 PAF (PAROXYSMAL ATRIAL FIBRILLATION): Chronic | ICD-10-CM

## 2021-05-24 DIAGNOSIS — M75.122 COMPLETE TEAR OF LEFT ROTATOR CUFF, UNSPECIFIED WHETHER TRAUMATIC: ICD-10-CM

## 2021-05-24 DIAGNOSIS — F25.1 SCHIZOAFFECTIVE DISORDER, DEPRESSIVE TYPE: ICD-10-CM

## 2021-05-24 DIAGNOSIS — J44.9 CHRONIC OBSTRUCTIVE PULMONARY DISEASE, UNSPECIFIED COPD TYPE: ICD-10-CM

## 2021-05-24 DIAGNOSIS — E11.59 HYPERTENSION ASSOCIATED WITH DIABETES: ICD-10-CM

## 2021-05-24 DIAGNOSIS — E78.5 HYPERLIPIDEMIA ASSOCIATED WITH TYPE 2 DIABETES MELLITUS: ICD-10-CM

## 2021-05-24 DIAGNOSIS — I15.2 HYPERTENSION ASSOCIATED WITH DIABETES: ICD-10-CM

## 2021-05-24 DIAGNOSIS — L97.523 DIABETIC ULCER OF TOE OF LEFT FOOT ASSOCIATED WITH TYPE 2 DIABETES MELLITUS, WITH NECROSIS OF MUSCLE: ICD-10-CM

## 2021-05-24 DIAGNOSIS — R79.89 ELEVATED TROPONIN: ICD-10-CM

## 2021-05-24 DIAGNOSIS — R07.9 CHEST PAIN, UNSPECIFIED TYPE: ICD-10-CM

## 2021-05-24 DIAGNOSIS — I25.118 CORONARY ARTERY DISEASE OF NATIVE ARTERY OF NATIVE HEART WITH STABLE ANGINA PECTORIS: Chronic | ICD-10-CM

## 2021-05-24 DIAGNOSIS — I24.9 ACUTE CORONARY SYNDROME: ICD-10-CM

## 2021-05-24 LAB
APTT BLDCRRT: 25.6 SEC (ref 21–32)
APTT BLDCRRT: 33.6 SEC (ref 21–32)
CTP QC/QA: YES
HCV AB SERPL QL IA: NEGATIVE
INR PPP: 1 (ref 0.8–1.2)
MAGNESIUM SERPL-MCNC: 1.6 MG/DL (ref 1.6–2.6)
POCT GLUCOSE: 213 MG/DL (ref 70–110)
PROTHROMBIN TIME: 10.7 SEC (ref 9–12.5)
SARS-COV-2 RDRP RESP QL NAA+PROBE: NEGATIVE
TROPONIN I SERPL DL<=0.01 NG/ML-MCNC: 0.21 NG/ML (ref 0–0.03)
TROPONIN I SERPL DL<=0.01 NG/ML-MCNC: 0.21 NG/ML (ref 0–0.03)

## 2021-05-24 PROCEDURE — 63600175 PHARM REV CODE 636 W HCPCS: Performed by: NURSE PRACTITIONER

## 2021-05-24 PROCEDURE — 25000003 PHARM REV CODE 250: Performed by: NURSE PRACTITIONER

## 2021-05-24 PROCEDURE — 93010 ELECTROCARDIOGRAM REPORT: CPT | Mod: S$GLB,,, | Performed by: INTERNAL MEDICINE

## 2021-05-24 PROCEDURE — 96365 THER/PROPH/DIAG IV INF INIT: CPT | Mod: ER

## 2021-05-24 PROCEDURE — 71046 X-RAY EXAM CHEST 2 VIEWS: CPT | Mod: 26,,, | Performed by: RADIOLOGY

## 2021-05-24 PROCEDURE — 3052F PR MOST RECENT HEMOGLOBIN A1C LEVEL 8.0 - < 9.0%: ICD-10-PCS | Mod: CPTII,S$GLB,, | Performed by: NURSE PRACTITIONER

## 2021-05-24 PROCEDURE — C9399 UNCLASSIFIED DRUGS OR BIOLOG: HCPCS | Performed by: NURSE PRACTITIONER

## 2021-05-24 PROCEDURE — 93010 EKG 12-LEAD: ICD-10-PCS | Mod: S$GLB,,, | Performed by: INTERNAL MEDICINE

## 2021-05-24 PROCEDURE — 85730 THROMBOPLASTIN TIME PARTIAL: CPT | Mod: 91 | Performed by: NURSE PRACTITIONER

## 2021-05-24 PROCEDURE — G0378 HOSPITAL OBSERVATION PER HR: HCPCS

## 2021-05-24 PROCEDURE — 96366 THER/PROPH/DIAG IV INF ADDON: CPT

## 2021-05-24 PROCEDURE — U0002 COVID-19 LAB TEST NON-CDC: HCPCS | Mod: ER | Performed by: EMERGENCY MEDICINE

## 2021-05-24 PROCEDURE — 84484 ASSAY OF TROPONIN QUANT: CPT | Mod: ER | Performed by: EMERGENCY MEDICINE

## 2021-05-24 PROCEDURE — 83735 ASSAY OF MAGNESIUM: CPT | Mod: ER | Performed by: NURSE PRACTITIONER

## 2021-05-24 PROCEDURE — 84484 ASSAY OF TROPONIN QUANT: CPT | Mod: 91,ER | Performed by: NURSE PRACTITIONER

## 2021-05-24 PROCEDURE — 96366 THER/PROPH/DIAG IV INF ADDON: CPT | Mod: ER

## 2021-05-24 PROCEDURE — 99214 OFFICE O/P EST MOD 30 MIN: CPT | Mod: S$GLB,,, | Performed by: NURSE PRACTITIONER

## 2021-05-24 PROCEDURE — 3052F HG A1C>EQUAL 8.0%<EQUAL 9.0%: CPT | Mod: CPTII,S$GLB,, | Performed by: NURSE PRACTITIONER

## 2021-05-24 PROCEDURE — 96372 THER/PROPH/DIAG INJ SC/IM: CPT | Mod: 59

## 2021-05-24 PROCEDURE — 85610 PROTHROMBIN TIME: CPT | Mod: ER | Performed by: EMERGENCY MEDICINE

## 2021-05-24 PROCEDURE — 63600175 PHARM REV CODE 636 W HCPCS: Mod: ER | Performed by: EMERGENCY MEDICINE

## 2021-05-24 PROCEDURE — 71046 XR CHEST PA AND LATERAL: ICD-10-PCS | Mod: 26,,, | Performed by: RADIOLOGY

## 2021-05-24 PROCEDURE — 71046 X-RAY EXAM CHEST 2 VIEWS: CPT | Mod: TC,PO

## 2021-05-24 PROCEDURE — 1125F AMNT PAIN NOTED PAIN PRSNT: CPT | Mod: S$GLB,,, | Performed by: NURSE PRACTITIONER

## 2021-05-24 PROCEDURE — 1125F PR PAIN SEVERITY QUANTIFIED, PAIN PRESENT: ICD-10-PCS | Mod: S$GLB,,, | Performed by: NURSE PRACTITIONER

## 2021-05-24 PROCEDURE — 99214 PR OFFICE/OUTPT VISIT, EST, LEVL IV, 30-39 MIN: ICD-10-PCS | Mod: S$GLB,,, | Performed by: NURSE PRACTITIONER

## 2021-05-24 PROCEDURE — 36415 COLL VENOUS BLD VENIPUNCTURE: CPT | Performed by: NURSE PRACTITIONER

## 2021-05-24 PROCEDURE — 3008F PR BODY MASS INDEX (BMI) DOCUMENTED: ICD-10-PCS | Mod: CPTII,S$GLB,, | Performed by: NURSE PRACTITIONER

## 2021-05-24 PROCEDURE — 99999 PR PBB SHADOW E&M-EST. PATIENT-LVL V: ICD-10-PCS | Mod: PBBFAC,,, | Performed by: NURSE PRACTITIONER

## 2021-05-24 PROCEDURE — 99499 RISK ADDL DX/OHS AUDIT: ICD-10-PCS | Mod: S$PBB,,, | Performed by: NURSE PRACTITIONER

## 2021-05-24 PROCEDURE — 3008F BODY MASS INDEX DOCD: CPT | Mod: CPTII,S$GLB,, | Performed by: NURSE PRACTITIONER

## 2021-05-24 PROCEDURE — 85730 THROMBOPLASTIN TIME PARTIAL: CPT | Mod: ER | Performed by: EMERGENCY MEDICINE

## 2021-05-24 PROCEDURE — 86803 HEPATITIS C AB TEST: CPT | Performed by: EMERGENCY MEDICINE

## 2021-05-24 PROCEDURE — 99291 CRITICAL CARE FIRST HOUR: CPT | Mod: 25,ER

## 2021-05-24 PROCEDURE — 93005 ELECTROCARDIOGRAM TRACING: CPT | Mod: PO

## 2021-05-24 PROCEDURE — 99499 UNLISTED E&M SERVICE: CPT | Mod: S$PBB,,, | Performed by: NURSE PRACTITIONER

## 2021-05-24 PROCEDURE — 25000242 PHARM REV CODE 250 ALT 637 W/ HCPCS: Performed by: NURSE PRACTITIONER

## 2021-05-24 PROCEDURE — 99999 PR PBB SHADOW E&M-EST. PATIENT-LVL V: CPT | Mod: PBBFAC,,, | Performed by: NURSE PRACTITIONER

## 2021-05-24 RX ORDER — NITROGLYCERIN 0.4 MG/1
0.4 TABLET SUBLINGUAL EVERY 5 MIN PRN
Status: DISCONTINUED | OUTPATIENT
Start: 2021-05-24 | End: 2021-05-25 | Stop reason: HOSPADM

## 2021-05-24 RX ORDER — METOPROLOL SUCCINATE 50 MG/1
50 TABLET, EXTENDED RELEASE ORAL DAILY
Status: DISCONTINUED | OUTPATIENT
Start: 2021-05-24 | End: 2021-05-25 | Stop reason: HOSPADM

## 2021-05-24 RX ORDER — LISINOPRIL 10 MG/1
10 TABLET ORAL DAILY
Status: DISCONTINUED | OUTPATIENT
Start: 2021-05-25 | End: 2021-05-25 | Stop reason: HOSPADM

## 2021-05-24 RX ORDER — INSULIN ASPART 100 [IU]/ML
0-5 INJECTION, SOLUTION INTRAVENOUS; SUBCUTANEOUS
Status: DISCONTINUED | OUTPATIENT
Start: 2021-05-24 | End: 2021-05-25 | Stop reason: HOSPADM

## 2021-05-24 RX ORDER — PANTOPRAZOLE SODIUM 40 MG/1
40 TABLET, DELAYED RELEASE ORAL DAILY
Status: DISCONTINUED | OUTPATIENT
Start: 2021-05-25 | End: 2021-05-25 | Stop reason: HOSPADM

## 2021-05-24 RX ORDER — ISOSORBIDE MONONITRATE 60 MG/1
60 TABLET, EXTENDED RELEASE ORAL DAILY
Status: DISCONTINUED | OUTPATIENT
Start: 2021-05-24 | End: 2021-05-25 | Stop reason: HOSPADM

## 2021-05-24 RX ORDER — GLUCAGON 1 MG
1 KIT INJECTION
Status: DISCONTINUED | OUTPATIENT
Start: 2021-05-24 | End: 2021-05-25 | Stop reason: HOSPADM

## 2021-05-24 RX ORDER — HEPARIN SODIUM,PORCINE/D5W 25000/250
0-40 INTRAVENOUS SOLUTION INTRAVENOUS CONTINUOUS
Status: DISCONTINUED | OUTPATIENT
Start: 2021-05-24 | End: 2021-05-25 | Stop reason: HOSPADM

## 2021-05-24 RX ORDER — NAPROXEN SODIUM 220 MG/1
81 TABLET, FILM COATED ORAL DAILY
Status: DISCONTINUED | OUTPATIENT
Start: 2021-05-24 | End: 2021-05-25 | Stop reason: HOSPADM

## 2021-05-24 RX ORDER — FUROSEMIDE 80 MG/1
80 TABLET ORAL DAILY
Status: DISCONTINUED | OUTPATIENT
Start: 2021-05-25 | End: 2021-05-25 | Stop reason: HOSPADM

## 2021-05-24 RX ORDER — TALC
6 POWDER (GRAM) TOPICAL NIGHTLY PRN
Status: DISCONTINUED | OUTPATIENT
Start: 2021-05-24 | End: 2021-05-25 | Stop reason: HOSPADM

## 2021-05-24 RX ORDER — IBUPROFEN 200 MG
24 TABLET ORAL
Status: DISCONTINUED | OUTPATIENT
Start: 2021-05-24 | End: 2021-05-25 | Stop reason: HOSPADM

## 2021-05-24 RX ORDER — IBUPROFEN 200 MG
16 TABLET ORAL
Status: DISCONTINUED | OUTPATIENT
Start: 2021-05-24 | End: 2021-05-25 | Stop reason: HOSPADM

## 2021-05-24 RX ORDER — TAMSULOSIN HYDROCHLORIDE 0.4 MG/1
0.4 CAPSULE ORAL DAILY
Status: DISCONTINUED | OUTPATIENT
Start: 2021-05-25 | End: 2021-05-25 | Stop reason: HOSPADM

## 2021-05-24 RX ORDER — LANOLIN ALCOHOL/MO/W.PET/CERES
400 CREAM (GRAM) TOPICAL DAILY
Status: DISCONTINUED | OUTPATIENT
Start: 2021-05-25 | End: 2021-05-25 | Stop reason: HOSPADM

## 2021-05-24 RX ORDER — SODIUM CHLORIDE 0.9 % (FLUSH) 0.9 %
10 SYRINGE (ML) INJECTION
Status: DISCONTINUED | OUTPATIENT
Start: 2021-05-24 | End: 2021-05-25 | Stop reason: HOSPADM

## 2021-05-24 RX ORDER — ATORVASTATIN CALCIUM 40 MG/1
40 TABLET, FILM COATED ORAL NIGHTLY
Status: DISCONTINUED | OUTPATIENT
Start: 2021-05-24 | End: 2021-05-25 | Stop reason: HOSPADM

## 2021-05-24 RX ORDER — GABAPENTIN 400 MG/1
800 CAPSULE ORAL 3 TIMES DAILY
Status: DISCONTINUED | OUTPATIENT
Start: 2021-05-24 | End: 2021-05-25 | Stop reason: HOSPADM

## 2021-05-24 RX ADMIN — NITROGLYCERIN 0.4 MG: 0.4 TABLET, ORALLY DISINTEGRATING SUBLINGUAL at 11:05

## 2021-05-24 RX ADMIN — HEPARIN SODIUM 12 UNITS/KG/HR: 10000 INJECTION, SOLUTION INTRAVENOUS at 02:05

## 2021-05-24 RX ADMIN — INSULIN DETEMIR 20 UNITS: 100 INJECTION, SOLUTION SUBCUTANEOUS at 09:05

## 2021-05-24 RX ADMIN — ATORVASTATIN CALCIUM 40 MG: 40 TABLET, FILM COATED ORAL at 08:05

## 2021-05-24 RX ADMIN — METOPROLOL SUCCINATE 50 MG: 50 TABLET, EXTENDED RELEASE ORAL at 08:05

## 2021-05-24 RX ADMIN — GABAPENTIN 800 MG: 400 CAPSULE ORAL at 09:05

## 2021-05-24 RX ADMIN — ASPIRIN 81 MG CHEWABLE TABLET 81 MG: 81 TABLET CHEWABLE at 08:05

## 2021-05-24 RX ADMIN — ISOSORBIDE MONONITRATE 60 MG: 60 TABLET, EXTENDED RELEASE ORAL at 08:05

## 2021-05-24 RX ADMIN — INSULIN ASPART 1 UNITS: 100 INJECTION, SOLUTION INTRAVENOUS; SUBCUTANEOUS at 09:05

## 2021-05-25 VITALS
WEIGHT: 194 LBS | TEMPERATURE: 97 F | DIASTOLIC BLOOD PRESSURE: 63 MMHG | BODY MASS INDEX: 32.32 KG/M2 | HEIGHT: 65 IN | RESPIRATION RATE: 18 BRPM | HEART RATE: 75 BPM | OXYGEN SATURATION: 96 % | SYSTOLIC BLOOD PRESSURE: 131 MMHG

## 2021-05-25 PROBLEM — N18.30 STAGE 3 CHRONIC KIDNEY DISEASE: Chronic | Status: RESOLVED | Noted: 2020-04-29 | Resolved: 2021-05-25

## 2021-05-25 PROBLEM — R07.89 OTHER CHEST PAIN: Status: RESOLVED | Noted: 2017-03-15 | Resolved: 2021-05-25

## 2021-05-25 PROBLEM — R07.89 OTHER CHEST PAIN: Status: ACTIVE | Noted: 2017-03-15

## 2021-05-25 PROBLEM — I24.9 ACUTE CORONARY SYNDROME: Status: RESOLVED | Noted: 2021-05-24 | Resolved: 2021-05-25

## 2021-05-25 LAB
AORTIC ROOT ANNULUS: 3.34 CM
APTT BLDCRRT: 48.5 SEC (ref 21–32)
APTT BLDCRRT: 51 SEC (ref 21–32)
ASCENDING AORTA: 3.06 CM
AV INDEX (PROSTH): 0.65
AV MEAN GRADIENT: 6 MMHG
AV PEAK GRADIENT: 10 MMHG
AV VALVE AREA: 2.17 CM2
AV VELOCITY RATIO: 0.61
BSA FOR ECHO PROCEDURE: 2.01 M2
CHOLEST SERPL-MCNC: 116 MG/DL (ref 120–199)
CHOLEST/HDLC SERPL: 3.7 {RATIO} (ref 2–5)
CV ECHO LV RWT: 0.8 CM
CV STRESS BASE HR: 70 BPM
DIASTOLIC BLOOD PRESSURE: 83 MMHG
DOP CALC AO PEAK VEL: 1.59 M/S
DOP CALC AO VTI: 30.18 CM
DOP CALC LVOT AREA: 3.4 CM2
DOP CALC LVOT DIAMETER: 2.07 CM
DOP CALC LVOT PEAK VEL: 0.97 M/S
DOP CALC LVOT STROKE VOLUME: 65.59 CM3
DOP CALC RVOT PEAK VEL: 0.8 M/S
DOP CALC RVOT VTI: 15.73 CM
DOP CALCLVOT PEAK VEL VTI: 19.5 CM
E WAVE DECELERATION TIME: 163 MSEC
E/A RATIO: 1.98
E/E' RATIO: 20.91 M/S
ECHO LV POSTERIOR WALL: 1.82 CM (ref 0.6–1.1)
EJECTION FRACTION: 40 %
ESTIMATED AVG GLUCOSE: 214 MG/DL (ref 68–131)
FRACTIONAL SHORTENING: 29 % (ref 28–44)
HBA1C MFR BLD: 9.1 % (ref 4–5.6)
HDLC SERPL-MCNC: 31 MG/DL (ref 40–75)
HDLC SERPL: 26.7 % (ref 20–50)
INTERVENTRICULAR SEPTUM: 1.88 CM (ref 0.6–1.1)
IVRT: 59.94 MSEC
LA MAJOR: 4.68 CM
LA MINOR: 3.35 CM
LDLC SERPL CALC-MCNC: 62.6 MG/DL (ref 63–159)
LEFT ATRIUM SIZE: 4.16 CM
LEFT INTERNAL DIMENSION IN SYSTOLE: 3.23 CM (ref 2.1–4)
LEFT VENTRICLE DIASTOLIC VOLUME INDEX: 48.61 ML/M2
LEFT VENTRICLE DIASTOLIC VOLUME: 94.78 ML
LEFT VENTRICLE MASS INDEX: 200 G/M2
LEFT VENTRICLE SYSTOLIC VOLUME INDEX: 21.5 ML/M2
LEFT VENTRICLE SYSTOLIC VOLUME: 41.85 ML
LEFT VENTRICULAR INTERNAL DIMENSION IN DIASTOLE: 4.55 CM (ref 3.5–6)
LEFT VENTRICULAR MASS: 389.41 G
LV LATERAL E/E' RATIO: 16.43 M/S
LV SEPTAL E/E' RATIO: 28.75 M/S
MAGNESIUM SERPL-MCNC: 1.7 MG/DL (ref 1.6–2.6)
MV PEAK A VEL: 0.58 M/S
MV PEAK E VEL: 1.15 M/S
MV STENOSIS PRESSURE HALF TIME: 47.27 MS
MV VALVE AREA P 1/2 METHOD: 4.65 CM2
NONHDLC SERPL-MCNC: 85 MG/DL
NUC REST DIASTOLIC VOLUME INDEX: 164
NUC REST EJECTION FRACTION: 35
NUC REST SYSTOLIC VOLUME INDEX: 107
NUC STRESS DIASTOLIC VOLUME INDEX: 169
NUC STRESS EJECTION FRACTION: 39 %
NUC STRESS SYSTOLIC VOLUME INDEX: 103
OHS CV CPX 85 PERCENT MAX PREDICTED HEART RATE MALE: 133
OHS CV CPX MAX PREDICTED HEART RATE: 157
OHS CV CPX PATIENT IS FEMALE: 0
OHS CV CPX PATIENT IS MALE: 1
OHS CV CPX PEAK DIASTOLIC BLOOD PRESSURE: 83 MMHG
OHS CV CPX PEAK HEAR RATE: 91 BPM
OHS CV CPX PEAK RATE PRESSURE PRODUCT: NORMAL
OHS CV CPX PEAK SYSTOLIC BLOOD PRESSURE: 122 MMHG
OHS CV CPX PERCENT MAX PREDICTED HEART RATE ACHIEVED: 58
OHS CV CPX RATE PRESSURE PRODUCT PRESENTING: 8540
PISA MRMAX VEL: 0.04 M/S
POCT GLUCOSE: 127 MG/DL (ref 70–110)
POCT GLUCOSE: 208 MG/DL (ref 70–110)
PV MEAN GRADIENT: 1 MMHG
RA MAJOR: 3.76 CM
RA PRESSURE: 3 MMHG
RIGHT VENTRICULAR END-DIASTOLIC DIMENSION: 2.65 CM
SINUS: 3.33 CM
STJ: 3.27 CM
SYSTOLIC BLOOD PRESSURE: 122 MMHG
TDI LATERAL: 0.07 M/S
TDI SEPTAL: 0.04 M/S
TDI: 0.06 M/S
TRICUSPID ANNULAR PLANE SYSTOLIC EXCURSION: 1.57 CM
TRIGL SERPL-MCNC: 112 MG/DL (ref 30–150)
TROPONIN I SERPL DL<=0.01 NG/ML-MCNC: 0.22 NG/ML (ref 0–0.03)

## 2021-05-25 PROCEDURE — 83036 HEMOGLOBIN GLYCOSYLATED A1C: CPT | Performed by: NURSE PRACTITIONER

## 2021-05-25 PROCEDURE — 63600175 PHARM REV CODE 636 W HCPCS: Performed by: EMERGENCY MEDICINE

## 2021-05-25 PROCEDURE — 25000003 PHARM REV CODE 250: Performed by: NURSE PRACTITIONER

## 2021-05-25 PROCEDURE — 25000003 PHARM REV CODE 250: Performed by: EMERGENCY MEDICINE

## 2021-05-25 PROCEDURE — 80061 LIPID PANEL: CPT | Performed by: NURSE PRACTITIONER

## 2021-05-25 PROCEDURE — 83735 ASSAY OF MAGNESIUM: CPT | Performed by: NURSE PRACTITIONER

## 2021-05-25 PROCEDURE — 63600175 PHARM REV CODE 636 W HCPCS: Performed by: INTERNAL MEDICINE

## 2021-05-25 PROCEDURE — 36415 COLL VENOUS BLD VENIPUNCTURE: CPT | Performed by: INTERNAL MEDICINE

## 2021-05-25 PROCEDURE — 84484 ASSAY OF TROPONIN QUANT: CPT | Performed by: INTERNAL MEDICINE

## 2021-05-25 PROCEDURE — 25000003 PHARM REV CODE 250: Performed by: INTERNAL MEDICINE

## 2021-05-25 PROCEDURE — G0378 HOSPITAL OBSERVATION PER HR: HCPCS

## 2021-05-25 PROCEDURE — 85730 THROMBOPLASTIN TIME PARTIAL: CPT | Mod: 91 | Performed by: INTERNAL MEDICINE

## 2021-05-25 PROCEDURE — 99215 PR OFFICE/OUTPT VISIT, EST, LEVL V, 40-54 MIN: ICD-10-PCS | Mod: 25,,, | Performed by: INTERNAL MEDICINE

## 2021-05-25 PROCEDURE — 96372 THER/PROPH/DIAG INJ SC/IM: CPT | Mod: 59

## 2021-05-25 PROCEDURE — 99215 OFFICE O/P EST HI 40 MIN: CPT | Mod: 25,,, | Performed by: INTERNAL MEDICINE

## 2021-05-25 PROCEDURE — 96366 THER/PROPH/DIAG IV INF ADDON: CPT

## 2021-05-25 PROCEDURE — 36415 COLL VENOUS BLD VENIPUNCTURE: CPT | Performed by: NURSE PRACTITIONER

## 2021-05-25 PROCEDURE — 63600175 PHARM REV CODE 636 W HCPCS: Performed by: NURSE PRACTITIONER

## 2021-05-25 RX ORDER — RANOLAZINE 500 MG/1
500 TABLET, EXTENDED RELEASE ORAL 2 TIMES DAILY
Status: DISCONTINUED | OUTPATIENT
Start: 2021-05-25 | End: 2021-05-25 | Stop reason: HOSPADM

## 2021-05-25 RX ORDER — HYDROCODONE BITARTRATE AND ACETAMINOPHEN 5; 325 MG/1; MG/1
1 TABLET ORAL EVERY 6 HOURS PRN
Status: DISCONTINUED | OUTPATIENT
Start: 2021-05-25 | End: 2021-05-25 | Stop reason: HOSPADM

## 2021-05-25 RX ORDER — REGADENOSON 0.08 MG/ML
0.4 INJECTION, SOLUTION INTRAVENOUS ONCE
Status: COMPLETED | OUTPATIENT
Start: 2021-05-25 | End: 2021-05-25

## 2021-05-25 RX ORDER — RANOLAZINE 500 MG/1
500 TABLET, EXTENDED RELEASE ORAL 2 TIMES DAILY
Qty: 60 TABLET | Refills: 11 | Status: SHIPPED | OUTPATIENT
Start: 2021-05-25 | End: 2022-06-28 | Stop reason: SDUPTHER

## 2021-05-25 RX ORDER — HYDROCODONE BITARTRATE AND ACETAMINOPHEN 7.5; 325 MG/1; MG/1
1 TABLET ORAL EVERY 6 HOURS PRN
Qty: 28 TABLET | Refills: 0 | Status: SHIPPED | OUTPATIENT
Start: 2021-05-25 | End: 2021-06-23 | Stop reason: SDUPTHER

## 2021-05-25 RX ADMIN — METOPROLOL SUCCINATE 50 MG: 50 TABLET, EXTENDED RELEASE ORAL at 08:05

## 2021-05-25 RX ADMIN — PANTOPRAZOLE SODIUM 40 MG: 40 TABLET, DELAYED RELEASE ORAL at 08:05

## 2021-05-25 RX ADMIN — GABAPENTIN 800 MG: 400 CAPSULE ORAL at 08:05

## 2021-05-25 RX ADMIN — FUROSEMIDE 80 MG: 80 TABLET ORAL at 08:05

## 2021-05-25 RX ADMIN — ASPIRIN 81 MG CHEWABLE TABLET 81 MG: 81 TABLET CHEWABLE at 08:05

## 2021-05-25 RX ADMIN — LISINOPRIL 10 MG: 10 TABLET ORAL at 08:05

## 2021-05-25 RX ADMIN — HYDROCODONE BITARTRATE AND ACETAMINOPHEN 1 TABLET: 5; 325 TABLET ORAL at 03:05

## 2021-05-25 RX ADMIN — TAMSULOSIN HYDROCHLORIDE 0.4 MG: 0.4 CAPSULE ORAL at 08:05

## 2021-05-25 RX ADMIN — Medication 6 MG: at 03:05

## 2021-05-25 RX ADMIN — GABAPENTIN 800 MG: 400 CAPSULE ORAL at 03:05

## 2021-05-25 RX ADMIN — Medication 400 MG: at 08:05

## 2021-05-25 RX ADMIN — REGADENOSON 0.4 MG: 0.08 INJECTION, SOLUTION INTRAVENOUS at 10:05

## 2021-05-25 RX ADMIN — ISOSORBIDE MONONITRATE 60 MG: 60 TABLET, EXTENDED RELEASE ORAL at 08:05

## 2021-05-25 RX ADMIN — HEPARIN SODIUM 14 UNITS/KG/HR: 10000 INJECTION, SOLUTION INTRAVENOUS at 11:05

## 2021-05-25 RX ADMIN — INSULIN ASPART 2 UNITS: 100 INJECTION, SOLUTION INTRAVENOUS; SUBCUTANEOUS at 05:05

## 2021-05-25 RX ADMIN — RANOLAZINE 500 MG: 500 TABLET, EXTENDED RELEASE ORAL at 04:05

## 2021-05-26 ENCOUNTER — TELEPHONE (OUTPATIENT)
Dept: PRIMARY CARE CLINIC | Facility: CLINIC | Age: 66
End: 2021-05-26

## 2021-05-27 ENCOUNTER — TELEPHONE (OUTPATIENT)
Dept: INTERNAL MEDICINE | Facility: CLINIC | Age: 66
End: 2021-05-27

## 2021-05-27 DIAGNOSIS — G89.29 OTHER CHRONIC PAIN: ICD-10-CM

## 2021-05-28 ENCOUNTER — PATIENT OUTREACH (OUTPATIENT)
Dept: ADMINISTRATIVE | Facility: HOSPITAL | Age: 66
End: 2021-05-28

## 2021-05-28 ENCOUNTER — OUTPATIENT CASE MANAGEMENT (OUTPATIENT)
Dept: ADMINISTRATIVE | Facility: OTHER | Age: 66
End: 2021-05-28

## 2021-05-31 ENCOUNTER — TELEPHONE (OUTPATIENT)
Dept: PRIMARY CARE CLINIC | Facility: CLINIC | Age: 66
End: 2021-05-31

## 2021-06-01 ENCOUNTER — CLINICAL SUPPORT (OUTPATIENT)
Dept: PRIMARY CARE CLINIC | Facility: CLINIC | Age: 66
End: 2021-06-01
Payer: MEDICARE

## 2021-06-01 DIAGNOSIS — G89.29 OTHER CHRONIC PAIN: ICD-10-CM

## 2021-06-01 DIAGNOSIS — F41.1 GAD (GENERALIZED ANXIETY DISORDER): ICD-10-CM

## 2021-06-01 DIAGNOSIS — F33.1 MDD (MAJOR DEPRESSIVE DISORDER), RECURRENT EPISODE, MODERATE: Primary | ICD-10-CM

## 2021-06-01 PROCEDURE — 90834 PSYTX W PT 45 MINUTES: CPT | Mod: BHI,95,, | Performed by: SOCIAL WORKER

## 2021-06-01 PROCEDURE — 90834 PR PSYCHOTHERAPY W/PATIENT, 45 MIN: ICD-10-PCS | Mod: BHI,95,, | Performed by: SOCIAL WORKER

## 2021-06-02 ENCOUNTER — TELEPHONE (OUTPATIENT)
Dept: INTERNAL MEDICINE | Facility: CLINIC | Age: 66
End: 2021-06-02

## 2021-06-03 ENCOUNTER — OUTPATIENT CASE MANAGEMENT (OUTPATIENT)
Dept: ADMINISTRATIVE | Facility: OTHER | Age: 66
End: 2021-06-03

## 2021-06-03 ENCOUNTER — OFFICE VISIT (OUTPATIENT)
Dept: PODIATRY | Facility: CLINIC | Age: 66
End: 2021-06-03
Payer: MEDICARE

## 2021-06-03 VITALS
DIASTOLIC BLOOD PRESSURE: 80 MMHG | BODY MASS INDEX: 32.32 KG/M2 | SYSTOLIC BLOOD PRESSURE: 145 MMHG | HEIGHT: 65 IN | WEIGHT: 194 LBS | HEART RATE: 86 BPM

## 2021-06-03 DIAGNOSIS — L84 PRE-ULCERATIVE CALLUSES: ICD-10-CM

## 2021-06-03 DIAGNOSIS — E11.49 TYPE II DIABETES MELLITUS WITH NEUROLOGICAL MANIFESTATIONS: Primary | ICD-10-CM

## 2021-06-03 DIAGNOSIS — B35.1 DERMATOPHYTOSIS OF NAIL: ICD-10-CM

## 2021-06-03 PROCEDURE — 3008F PR BODY MASS INDEX (BMI) DOCUMENTED: ICD-10-PCS | Mod: CPTII,S$GLB,, | Performed by: PODIATRIST

## 2021-06-03 PROCEDURE — 99999 PR PBB SHADOW E&M-EST. PATIENT-LVL V: CPT | Mod: PBBFAC,,, | Performed by: PODIATRIST

## 2021-06-03 PROCEDURE — 99499 NO LOS: ICD-10-PCS | Mod: S$GLB,,, | Performed by: PODIATRIST

## 2021-06-03 PROCEDURE — 99499 UNLISTED E&M SERVICE: CPT | Mod: S$GLB,,, | Performed by: PODIATRIST

## 2021-06-03 PROCEDURE — 99999 PR PBB SHADOW E&M-EST. PATIENT-LVL V: ICD-10-PCS | Mod: PBBFAC,,, | Performed by: PODIATRIST

## 2021-06-03 PROCEDURE — 3046F HEMOGLOBIN A1C LEVEL >9.0%: CPT | Mod: CPTII,S$GLB,, | Performed by: PODIATRIST

## 2021-06-03 PROCEDURE — 3008F BODY MASS INDEX DOCD: CPT | Mod: CPTII,S$GLB,, | Performed by: PODIATRIST

## 2021-06-03 PROCEDURE — 11721 PR DEBRIDEMENT OF NAILS, 6 OR MORE: ICD-10-PCS | Mod: 59,Q7,S$GLB, | Performed by: PODIATRIST

## 2021-06-03 PROCEDURE — 11055 PARING/CUTG B9 HYPRKER LES 1: CPT | Mod: Q7,S$GLB,, | Performed by: PODIATRIST

## 2021-06-03 PROCEDURE — 11721 DEBRIDE NAIL 6 OR MORE: CPT | Mod: 59,Q7,S$GLB, | Performed by: PODIATRIST

## 2021-06-03 PROCEDURE — 3046F PR MOST RECENT HEMOGLOBIN A1C LEVEL > 9.0%: ICD-10-PCS | Mod: CPTII,S$GLB,, | Performed by: PODIATRIST

## 2021-06-03 PROCEDURE — 11055 PR TRIM HYPERKERATOTIC SKIN LESION, ONE: ICD-10-PCS | Mod: Q7,S$GLB,, | Performed by: PODIATRIST

## 2021-06-09 ENCOUNTER — TELEPHONE (OUTPATIENT)
Dept: INTERNAL MEDICINE | Facility: CLINIC | Age: 66
End: 2021-06-09

## 2021-06-10 ENCOUNTER — TELEPHONE (OUTPATIENT)
Dept: ORTHOPEDICS | Facility: CLINIC | Age: 66
End: 2021-06-10

## 2021-06-10 ENCOUNTER — TELEPHONE (OUTPATIENT)
Dept: PAIN MEDICINE | Facility: CLINIC | Age: 66
End: 2021-06-10

## 2021-06-11 ENCOUNTER — OUTPATIENT CASE MANAGEMENT (OUTPATIENT)
Dept: ADMINISTRATIVE | Facility: OTHER | Age: 66
End: 2021-06-11

## 2021-06-11 ENCOUNTER — TELEPHONE (OUTPATIENT)
Dept: PRIMARY CARE CLINIC | Facility: CLINIC | Age: 66
End: 2021-06-11

## 2021-06-14 ENCOUNTER — TELEPHONE (OUTPATIENT)
Dept: PRIMARY CARE CLINIC | Facility: CLINIC | Age: 66
End: 2021-06-14

## 2021-06-14 ENCOUNTER — TELEPHONE (OUTPATIENT)
Dept: PAIN MEDICINE | Facility: CLINIC | Age: 66
End: 2021-06-14

## 2021-06-15 ENCOUNTER — CLINICAL SUPPORT (OUTPATIENT)
Dept: PRIMARY CARE CLINIC | Facility: CLINIC | Age: 66
End: 2021-06-15
Payer: MEDICARE

## 2021-06-15 ENCOUNTER — OUTPATIENT CASE MANAGEMENT (OUTPATIENT)
Dept: ADMINISTRATIVE | Facility: OTHER | Age: 66
End: 2021-06-15

## 2021-06-15 DIAGNOSIS — F33.1 MDD (MAJOR DEPRESSIVE DISORDER), RECURRENT EPISODE, MODERATE: Primary | ICD-10-CM

## 2021-06-15 DIAGNOSIS — G89.29 OTHER CHRONIC PAIN: ICD-10-CM

## 2021-06-15 DIAGNOSIS — M25.512 CHRONIC LEFT SHOULDER PAIN: ICD-10-CM

## 2021-06-15 DIAGNOSIS — G89.29 CHRONIC LEFT SHOULDER PAIN: ICD-10-CM

## 2021-06-15 DIAGNOSIS — F41.1 GAD (GENERALIZED ANXIETY DISORDER): ICD-10-CM

## 2021-06-15 PROCEDURE — 90834 PSYTX W PT 45 MINUTES: CPT | Mod: BHI,95,, | Performed by: SOCIAL WORKER

## 2021-06-15 PROCEDURE — 90834 PR PSYCHOTHERAPY W/PATIENT, 45 MIN: ICD-10-PCS | Mod: BHI,95,, | Performed by: SOCIAL WORKER

## 2021-06-16 ENCOUNTER — TELEPHONE (OUTPATIENT)
Dept: PRIMARY CARE CLINIC | Facility: CLINIC | Age: 66
End: 2021-06-16

## 2021-06-16 ENCOUNTER — TELEPHONE (OUTPATIENT)
Dept: ORTHOPEDICS | Facility: CLINIC | Age: 66
End: 2021-06-16

## 2021-06-17 ENCOUNTER — TELEPHONE (OUTPATIENT)
Dept: INTERNAL MEDICINE | Facility: CLINIC | Age: 66
End: 2021-06-17

## 2021-06-17 ENCOUNTER — HOSPITAL ENCOUNTER (OUTPATIENT)
Facility: HOSPITAL | Age: 66
Discharge: HOME | End: 2021-06-18
Attending: EMERGENCY MEDICINE | Admitting: EMERGENCY MEDICINE
Payer: MEDICARE

## 2021-06-17 DIAGNOSIS — R07.9 CHEST PAIN: ICD-10-CM

## 2021-06-17 DIAGNOSIS — I10 HYPERTENSION, UNSPECIFIED TYPE: Primary | ICD-10-CM

## 2021-06-17 LAB
ALBUMIN SERPL BCP-MCNC: 3 G/DL (ref 3.5–5.2)
ALP SERPL-CCNC: 70 U/L (ref 55–135)
ALT SERPL W/O P-5'-P-CCNC: 22 U/L (ref 10–44)
ANION GAP SERPL CALC-SCNC: 9 MMOL/L (ref 8–16)
AST SERPL-CCNC: 21 U/L (ref 10–40)
BASOPHILS # BLD AUTO: 0.01 K/UL (ref 0–0.2)
BASOPHILS NFR BLD: 0.1 % (ref 0–1.9)
BILIRUB SERPL-MCNC: 0.3 MG/DL (ref 0.1–1)
BNP SERPL-MCNC: 237 PG/ML (ref 0–99)
BUN SERPL-MCNC: 12 MG/DL (ref 8–23)
CALCIUM SERPL-MCNC: 8.4 MG/DL (ref 8.7–10.5)
CHLORIDE SERPL-SCNC: 105 MMOL/L (ref 95–110)
CO2 SERPL-SCNC: 26 MMOL/L (ref 23–29)
CREAT SERPL-MCNC: 0.9 MG/DL (ref 0.5–1.4)
CTP QC/QA: YES
D DIMER PPP IA.FEU-MCNC: 1.73 MG/L FEU
DIFFERENTIAL METHOD: ABNORMAL
EOSINOPHIL # BLD AUTO: 0.2 K/UL (ref 0–0.5)
EOSINOPHIL NFR BLD: 2.7 % (ref 0–8)
ERYTHROCYTE [DISTWIDTH] IN BLOOD BY AUTOMATED COUNT: 14.2 % (ref 11.5–14.5)
EST. GFR  (AFRICAN AMERICAN): >60 ML/MIN/1.73 M^2
EST. GFR  (NON AFRICAN AMERICAN): >60 ML/MIN/1.73 M^2
GLUCOSE SERPL-MCNC: 164 MG/DL (ref 70–110)
HCT VFR BLD AUTO: 36.3 % (ref 40–54)
HGB BLD-MCNC: 11.2 G/DL (ref 14–18)
IMM GRANULOCYTES # BLD AUTO: 0.02 K/UL (ref 0–0.04)
IMM GRANULOCYTES NFR BLD AUTO: 0.3 % (ref 0–0.5)
LYMPHOCYTES # BLD AUTO: 1.8 K/UL (ref 1–4.8)
LYMPHOCYTES NFR BLD: 23.4 % (ref 18–48)
MCH RBC QN AUTO: 27.3 PG (ref 27–31)
MCHC RBC AUTO-ENTMCNC: 30.9 G/DL (ref 32–36)
MCV RBC AUTO: 89 FL (ref 82–98)
MONOCYTES # BLD AUTO: 0.6 K/UL (ref 0.3–1)
MONOCYTES NFR BLD: 7.3 % (ref 4–15)
NEUTROPHILS # BLD AUTO: 5.2 K/UL (ref 1.8–7.7)
NEUTROPHILS NFR BLD: 66.2 % (ref 38–73)
NRBC BLD-RTO: 0 /100 WBC
PLATELET # BLD AUTO: 203 K/UL (ref 150–450)
PMV BLD AUTO: 10.4 FL (ref 9.2–12.9)
POCT GLUCOSE: 114 MG/DL (ref 70–110)
POTASSIUM SERPL-SCNC: 4 MMOL/L (ref 3.5–5.1)
PROT SERPL-MCNC: 7 G/DL (ref 6–8.4)
RBC # BLD AUTO: 4.1 M/UL (ref 4.6–6.2)
SARS-COV-2 RDRP RESP QL NAA+PROBE: NEGATIVE
SODIUM SERPL-SCNC: 140 MMOL/L (ref 136–145)
TROPONIN I SERPL DL<=0.01 NG/ML-MCNC: 0.2 NG/ML (ref 0–0.03)
TROPONIN I SERPL DL<=0.01 NG/ML-MCNC: 0.2 NG/ML (ref 0–0.03)
WBC # BLD AUTO: 7.85 K/UL (ref 3.9–12.7)

## 2021-06-17 PROCEDURE — 93010 ELECTROCARDIOGRAM REPORT: CPT | Mod: ,,, | Performed by: INTERNAL MEDICINE

## 2021-06-17 PROCEDURE — 83880 ASSAY OF NATRIURETIC PEPTIDE: CPT | Performed by: EMERGENCY MEDICINE

## 2021-06-17 PROCEDURE — U0002 COVID-19 LAB TEST NON-CDC: HCPCS | Performed by: EMERGENCY MEDICINE

## 2021-06-17 PROCEDURE — 99285 EMERGENCY DEPT VISIT HI MDM: CPT | Mod: 25

## 2021-06-17 PROCEDURE — 25000003 PHARM REV CODE 250: Performed by: EMERGENCY MEDICINE

## 2021-06-17 PROCEDURE — G0378 HOSPITAL OBSERVATION PER HR: HCPCS

## 2021-06-17 PROCEDURE — 85379 FIBRIN DEGRADATION QUANT: CPT | Performed by: EMERGENCY MEDICINE

## 2021-06-17 PROCEDURE — 93005 ELECTROCARDIOGRAM TRACING: CPT

## 2021-06-17 PROCEDURE — 25500020 PHARM REV CODE 255: Performed by: EMERGENCY MEDICINE

## 2021-06-17 PROCEDURE — 85025 COMPLETE CBC W/AUTO DIFF WBC: CPT | Performed by: EMERGENCY MEDICINE

## 2021-06-17 PROCEDURE — 25000242 PHARM REV CODE 250 ALT 637 W/ HCPCS: Performed by: EMERGENCY MEDICINE

## 2021-06-17 PROCEDURE — 93010 EKG 12-LEAD: ICD-10-PCS | Mod: ,,, | Performed by: INTERNAL MEDICINE

## 2021-06-17 PROCEDURE — 84484 ASSAY OF TROPONIN QUANT: CPT | Mod: 91 | Performed by: EMERGENCY MEDICINE

## 2021-06-17 PROCEDURE — 80053 COMPREHEN METABOLIC PANEL: CPT | Performed by: EMERGENCY MEDICINE

## 2021-06-17 RX ORDER — NITROGLYCERIN 0.4 MG/1
0.4 TABLET SUBLINGUAL
Status: COMPLETED | OUTPATIENT
Start: 2021-06-17 | End: 2021-06-17

## 2021-06-17 RX ORDER — HYDROCODONE BITARTRATE AND ACETAMINOPHEN 10; 325 MG/1; MG/1
1 TABLET ORAL EVERY 6 HOURS PRN
Status: DISCONTINUED | OUTPATIENT
Start: 2021-06-17 | End: 2021-06-18 | Stop reason: HOSPADM

## 2021-06-17 RX ORDER — ASPIRIN 325 MG
325 TABLET ORAL
Status: COMPLETED | OUTPATIENT
Start: 2021-06-17 | End: 2021-06-17

## 2021-06-17 RX ADMIN — NITROGLYCERIN 1 INCH: 20 OINTMENT TOPICAL at 02:06

## 2021-06-17 RX ADMIN — HYDROCODONE BITARTRATE AND ACETAMINOPHEN 1 TABLET: 10; 325 TABLET ORAL at 07:06

## 2021-06-17 RX ADMIN — NITROGLYCERIN 0.4 MG: 0.4 TABLET, ORALLY DISINTEGRATING SUBLINGUAL at 02:06

## 2021-06-17 RX ADMIN — IOHEXOL 100 ML: 350 INJECTION, SOLUTION INTRAVENOUS at 03:06

## 2021-06-17 RX ADMIN — ASPIRIN 325 MG ORAL TABLET 325 MG: 325 PILL ORAL at 01:06

## 2021-06-18 ENCOUNTER — TELEPHONE (OUTPATIENT)
Dept: CARDIOLOGY | Facility: CLINIC | Age: 66
End: 2021-06-18

## 2021-06-18 ENCOUNTER — TELEPHONE (OUTPATIENT)
Dept: PRIMARY CARE CLINIC | Facility: CLINIC | Age: 66
End: 2021-06-18

## 2021-06-18 VITALS
HEIGHT: 65 IN | RESPIRATION RATE: 18 BRPM | WEIGHT: 192.25 LBS | SYSTOLIC BLOOD PRESSURE: 126 MMHG | TEMPERATURE: 97 F | HEART RATE: 79 BPM | OXYGEN SATURATION: 93 % | DIASTOLIC BLOOD PRESSURE: 87 MMHG | BODY MASS INDEX: 32.03 KG/M2

## 2021-06-18 LAB
ALBUMIN SERPL BCP-MCNC: 2.8 G/DL (ref 3.5–5.2)
ALP SERPL-CCNC: 71 U/L (ref 55–135)
ALT SERPL W/O P-5'-P-CCNC: 24 U/L (ref 10–44)
ANION GAP SERPL CALC-SCNC: 10 MMOL/L (ref 8–16)
AST SERPL-CCNC: 22 U/L (ref 10–40)
BASOPHILS # BLD AUTO: 0.01 K/UL (ref 0–0.2)
BASOPHILS NFR BLD: 0.2 % (ref 0–1.9)
BILIRUB SERPL-MCNC: 0.2 MG/DL (ref 0.1–1)
BUN SERPL-MCNC: 13 MG/DL (ref 8–23)
CALCIUM SERPL-MCNC: 8.9 MG/DL (ref 8.7–10.5)
CHLORIDE SERPL-SCNC: 106 MMOL/L (ref 95–110)
CO2 SERPL-SCNC: 25 MMOL/L (ref 23–29)
CREAT SERPL-MCNC: 0.9 MG/DL (ref 0.5–1.4)
DIFFERENTIAL METHOD: ABNORMAL
EOSINOPHIL # BLD AUTO: 0.2 K/UL (ref 0–0.5)
EOSINOPHIL NFR BLD: 2.7 % (ref 0–8)
ERYTHROCYTE [DISTWIDTH] IN BLOOD BY AUTOMATED COUNT: 14.2 % (ref 11.5–14.5)
EST. GFR  (AFRICAN AMERICAN): >60 ML/MIN/1.73 M^2
EST. GFR  (NON AFRICAN AMERICAN): >60 ML/MIN/1.73 M^2
GLUCOSE SERPL-MCNC: 196 MG/DL (ref 70–110)
HCT VFR BLD AUTO: 36.3 % (ref 40–54)
HGB BLD-MCNC: 11 G/DL (ref 14–18)
IMM GRANULOCYTES # BLD AUTO: 0.02 K/UL (ref 0–0.04)
IMM GRANULOCYTES NFR BLD AUTO: 0.3 % (ref 0–0.5)
LYMPHOCYTES # BLD AUTO: 1.8 K/UL (ref 1–4.8)
LYMPHOCYTES NFR BLD: 29.5 % (ref 18–48)
MAGNESIUM SERPL-MCNC: 2 MG/DL (ref 1.6–2.6)
MCH RBC QN AUTO: 27 PG (ref 27–31)
MCHC RBC AUTO-ENTMCNC: 30.3 G/DL (ref 32–36)
MCV RBC AUTO: 89 FL (ref 82–98)
MONOCYTES # BLD AUTO: 0.6 K/UL (ref 0.3–1)
MONOCYTES NFR BLD: 9.7 % (ref 4–15)
NEUTROPHILS # BLD AUTO: 3.5 K/UL (ref 1.8–7.7)
NEUTROPHILS NFR BLD: 57.6 % (ref 38–73)
NRBC BLD-RTO: 0 /100 WBC
PHOSPHATE SERPL-MCNC: 3.2 MG/DL (ref 2.7–4.5)
PLATELET # BLD AUTO: 195 K/UL (ref 150–450)
PMV BLD AUTO: 10.2 FL (ref 9.2–12.9)
POCT GLUCOSE: 171 MG/DL (ref 70–110)
POTASSIUM SERPL-SCNC: 3.7 MMOL/L (ref 3.5–5.1)
PROT SERPL-MCNC: 6.8 G/DL (ref 6–8.4)
RBC # BLD AUTO: 4.08 M/UL (ref 4.6–6.2)
SODIUM SERPL-SCNC: 141 MMOL/L (ref 136–145)
WBC # BLD AUTO: 6 K/UL (ref 3.9–12.7)

## 2021-06-18 PROCEDURE — 99220 PR INITIAL OBSERVATION CARE,LEVL III: CPT | Mod: ,,, | Performed by: INTERNAL MEDICINE

## 2021-06-18 PROCEDURE — 25000003 PHARM REV CODE 250: Performed by: PHYSICIAN ASSISTANT

## 2021-06-18 PROCEDURE — 80053 COMPREHEN METABOLIC PANEL: CPT | Performed by: NURSE PRACTITIONER

## 2021-06-18 PROCEDURE — 85025 COMPLETE CBC W/AUTO DIFF WBC: CPT | Performed by: NURSE PRACTITIONER

## 2021-06-18 PROCEDURE — 99220 PR INITIAL OBSERVATION CARE,LEVL III: ICD-10-PCS | Mod: ,,, | Performed by: INTERNAL MEDICINE

## 2021-06-18 PROCEDURE — 84100 ASSAY OF PHOSPHORUS: CPT | Performed by: NURSE PRACTITIONER

## 2021-06-18 PROCEDURE — 25000003 PHARM REV CODE 250: Performed by: EMERGENCY MEDICINE

## 2021-06-18 PROCEDURE — 83735 ASSAY OF MAGNESIUM: CPT | Performed by: NURSE PRACTITIONER

## 2021-06-18 PROCEDURE — G0378 HOSPITAL OBSERVATION PER HR: HCPCS

## 2021-06-18 PROCEDURE — 25000003 PHARM REV CODE 250: Performed by: NURSE PRACTITIONER

## 2021-06-18 PROCEDURE — 36415 COLL VENOUS BLD VENIPUNCTURE: CPT | Performed by: NURSE PRACTITIONER

## 2021-06-18 RX ORDER — GABAPENTIN 400 MG/1
800 CAPSULE ORAL 3 TIMES DAILY
Status: DISCONTINUED | OUTPATIENT
Start: 2021-06-18 | End: 2021-06-18 | Stop reason: HOSPADM

## 2021-06-18 RX ORDER — ASPIRIN 81 MG/1
81 TABLET ORAL DAILY
Status: DISCONTINUED | OUTPATIENT
Start: 2021-06-18 | End: 2021-06-18 | Stop reason: HOSPADM

## 2021-06-18 RX ORDER — LATANOPROST 50 UG/ML
1 SOLUTION/ DROPS OPHTHALMIC NIGHTLY
Status: DISCONTINUED | OUTPATIENT
Start: 2021-06-18 | End: 2021-06-18 | Stop reason: HOSPADM

## 2021-06-18 RX ORDER — IBUPROFEN 200 MG
16 TABLET ORAL
Status: DISCONTINUED | OUTPATIENT
Start: 2021-06-18 | End: 2021-06-18 | Stop reason: HOSPADM

## 2021-06-18 RX ORDER — IBUPROFEN 200 MG
24 TABLET ORAL
Status: DISCONTINUED | OUTPATIENT
Start: 2021-06-18 | End: 2021-06-18 | Stop reason: HOSPADM

## 2021-06-18 RX ORDER — INSULIN ASPART 100 [IU]/ML
1-10 INJECTION, SOLUTION INTRAVENOUS; SUBCUTANEOUS
Status: DISCONTINUED | OUTPATIENT
Start: 2021-06-18 | End: 2021-06-18 | Stop reason: HOSPADM

## 2021-06-18 RX ORDER — ACETAMINOPHEN 325 MG/1
650 TABLET ORAL EVERY 8 HOURS PRN
Status: DISCONTINUED | OUTPATIENT
Start: 2021-06-18 | End: 2021-06-18 | Stop reason: HOSPADM

## 2021-06-18 RX ORDER — TRAZODONE HYDROCHLORIDE 100 MG/1
200 TABLET ORAL NIGHTLY
Status: DISCONTINUED | OUTPATIENT
Start: 2021-06-18 | End: 2021-06-18 | Stop reason: HOSPADM

## 2021-06-18 RX ORDER — PANTOPRAZOLE SODIUM 40 MG/1
40 TABLET, DELAYED RELEASE ORAL DAILY
Status: DISCONTINUED | OUTPATIENT
Start: 2021-06-18 | End: 2021-06-18 | Stop reason: HOSPADM

## 2021-06-18 RX ORDER — AMOXICILLIN 250 MG
1 CAPSULE ORAL DAILY PRN
Status: DISCONTINUED | OUTPATIENT
Start: 2021-06-18 | End: 2021-06-18 | Stop reason: HOSPADM

## 2021-06-18 RX ORDER — RANOLAZINE 500 MG/1
500 TABLET, EXTENDED RELEASE ORAL 2 TIMES DAILY
Status: DISCONTINUED | OUTPATIENT
Start: 2021-06-18 | End: 2021-06-18 | Stop reason: HOSPADM

## 2021-06-18 RX ORDER — COLCHICINE 0.6 MG/1
0.6 TABLET, FILM COATED ORAL DAILY
Status: DISCONTINUED | OUTPATIENT
Start: 2021-06-18 | End: 2021-06-18 | Stop reason: HOSPADM

## 2021-06-18 RX ORDER — METOPROLOL SUCCINATE 50 MG/1
50 TABLET, EXTENDED RELEASE ORAL DAILY
Status: DISCONTINUED | OUTPATIENT
Start: 2021-06-18 | End: 2021-06-18 | Stop reason: HOSPADM

## 2021-06-18 RX ORDER — SODIUM,POTASSIUM PHOSPHATES 280-250MG
2 POWDER IN PACKET (EA) ORAL
Status: DISCONTINUED | OUTPATIENT
Start: 2021-06-18 | End: 2021-06-18 | Stop reason: HOSPADM

## 2021-06-18 RX ORDER — ATORVASTATIN CALCIUM 40 MG/1
40 TABLET, FILM COATED ORAL NIGHTLY
Status: DISCONTINUED | OUTPATIENT
Start: 2021-06-18 | End: 2021-06-18 | Stop reason: HOSPADM

## 2021-06-18 RX ORDER — SODIUM CHLORIDE 0.9 % (FLUSH) 0.9 %
10 SYRINGE (ML) INJECTION EVERY 8 HOURS PRN
Status: DISCONTINUED | OUTPATIENT
Start: 2021-06-18 | End: 2021-06-18 | Stop reason: HOSPADM

## 2021-06-18 RX ORDER — ONDANSETRON 2 MG/ML
8 INJECTION INTRAMUSCULAR; INTRAVENOUS EVERY 8 HOURS PRN
Status: DISCONTINUED | OUTPATIENT
Start: 2021-06-18 | End: 2021-06-18 | Stop reason: HOSPADM

## 2021-06-18 RX ORDER — INSULIN ASPART 100 [IU]/ML
0-5 INJECTION, SOLUTION INTRAVENOUS; SUBCUTANEOUS
Status: DISCONTINUED | OUTPATIENT
Start: 2021-06-18 | End: 2021-06-18 | Stop reason: HOSPADM

## 2021-06-18 RX ORDER — TALC
6 POWDER (GRAM) TOPICAL NIGHTLY PRN
Status: DISCONTINUED | OUTPATIENT
Start: 2021-06-18 | End: 2021-06-18 | Stop reason: HOSPADM

## 2021-06-18 RX ORDER — LANOLIN ALCOHOL/MO/W.PET/CERES
800 CREAM (GRAM) TOPICAL
Status: DISCONTINUED | OUTPATIENT
Start: 2021-06-18 | End: 2021-06-18 | Stop reason: HOSPADM

## 2021-06-18 RX ORDER — ISOSORBIDE MONONITRATE 60 MG/1
60 TABLET, EXTENDED RELEASE ORAL DAILY
Status: DISCONTINUED | OUTPATIENT
Start: 2021-06-18 | End: 2021-06-18 | Stop reason: HOSPADM

## 2021-06-18 RX ORDER — ALLOPURINOL 100 MG/1
100 TABLET ORAL DAILY
Status: DISCONTINUED | OUTPATIENT
Start: 2021-06-18 | End: 2021-06-18 | Stop reason: HOSPADM

## 2021-06-18 RX ORDER — HYDROCODONE BITARTRATE AND ACETAMINOPHEN 7.5; 325 MG/1; MG/1
1 TABLET ORAL EVERY 6 HOURS PRN
Status: DISCONTINUED | OUTPATIENT
Start: 2021-06-18 | End: 2021-06-18 | Stop reason: HOSPADM

## 2021-06-18 RX ORDER — SODIUM CHLORIDE 9 MG/ML
INJECTION, SOLUTION INTRAVENOUS CONTINUOUS
Status: DISCONTINUED | OUTPATIENT
Start: 2021-06-18 | End: 2021-06-18

## 2021-06-18 RX ORDER — LISINOPRIL 10 MG/1
10 TABLET ORAL DAILY
Status: DISCONTINUED | OUTPATIENT
Start: 2021-06-18 | End: 2021-06-18 | Stop reason: HOSPADM

## 2021-06-18 RX ORDER — TAMSULOSIN HYDROCHLORIDE 0.4 MG/1
0.4 CAPSULE ORAL DAILY
Status: DISCONTINUED | OUTPATIENT
Start: 2021-06-18 | End: 2021-06-18 | Stop reason: HOSPADM

## 2021-06-18 RX ORDER — SERTRALINE HYDROCHLORIDE 50 MG/1
100 TABLET, FILM COATED ORAL NIGHTLY
Status: DISCONTINUED | OUTPATIENT
Start: 2021-06-18 | End: 2021-06-18 | Stop reason: HOSPADM

## 2021-06-18 RX ORDER — GLUCAGON 1 MG
1 KIT INJECTION
Status: DISCONTINUED | OUTPATIENT
Start: 2021-06-18 | End: 2021-06-18 | Stop reason: HOSPADM

## 2021-06-18 RX ORDER — LATANOPROST 50 UG/ML
1 SOLUTION/ DROPS OPHTHALMIC DAILY
Status: DISCONTINUED | OUTPATIENT
Start: 2021-06-18 | End: 2021-06-18

## 2021-06-18 RX ADMIN — TAMSULOSIN HYDROCHLORIDE 0.4 MG: 0.4 CAPSULE ORAL at 08:06

## 2021-06-18 RX ADMIN — SODIUM CHLORIDE: 0.9 INJECTION, SOLUTION INTRAVENOUS at 09:06

## 2021-06-18 RX ADMIN — LISINOPRIL 10 MG: 10 TABLET ORAL at 08:06

## 2021-06-18 RX ADMIN — GABAPENTIN 800 MG: 400 CAPSULE ORAL at 08:06

## 2021-06-18 RX ADMIN — HYDROCODONE BITARTRATE AND ACETAMINOPHEN 1 TABLET: 10; 325 TABLET ORAL at 02:06

## 2021-06-18 RX ADMIN — COLCHICINE 0.6 MG: 0.6 TABLET, FILM COATED ORAL at 08:06

## 2021-06-18 RX ADMIN — LATANOPROST 1 DROP: 50 SOLUTION OPHTHALMIC at 03:06

## 2021-06-18 RX ADMIN — RANOLAZINE 500 MG: 500 TABLET, EXTENDED RELEASE ORAL at 08:06

## 2021-06-18 RX ADMIN — ALLOPURINOL 100 MG: 100 TABLET ORAL at 08:06

## 2021-06-18 RX ADMIN — ASPIRIN 81 MG: 81 TABLET, COATED ORAL at 08:06

## 2021-06-18 RX ADMIN — SERTRALINE HYDROCHLORIDE 100 MG: 50 TABLET ORAL at 03:06

## 2021-06-18 RX ADMIN — ISOSORBIDE MONONITRATE 60 MG: 60 TABLET, EXTENDED RELEASE ORAL at 08:06

## 2021-06-18 RX ADMIN — METOPROLOL SUCCINATE 50 MG: 50 TABLET, EXTENDED RELEASE ORAL at 08:06

## 2021-06-18 RX ADMIN — PANTOPRAZOLE SODIUM 40 MG: 40 TABLET, DELAYED RELEASE ORAL at 08:06

## 2021-06-18 RX ADMIN — TRAZODONE HYDROCHLORIDE 200 MG: 100 TABLET ORAL at 03:06

## 2021-06-18 RX ADMIN — ATORVASTATIN CALCIUM 40 MG: 40 TABLET, FILM COATED ORAL at 03:06

## 2021-06-21 ENCOUNTER — SPECIALTY PHARMACY (OUTPATIENT)
Dept: PHARMACY | Facility: CLINIC | Age: 66
End: 2021-06-21

## 2021-06-21 ENCOUNTER — TELEPHONE (OUTPATIENT)
Dept: PRIMARY CARE CLINIC | Facility: CLINIC | Age: 66
End: 2021-06-21

## 2021-06-21 DIAGNOSIS — M33.13 DERMATOMYOSITIS: Primary | ICD-10-CM

## 2021-06-21 DIAGNOSIS — M33.13 DERMATOMYOSITIS: ICD-10-CM

## 2021-06-21 RX ORDER — AZATHIOPRINE 50 MG/1
50 TABLET ORAL DAILY
Qty: 90 TABLET | Refills: 0 | Status: SHIPPED | OUTPATIENT
Start: 2021-06-21 | End: 2021-09-10 | Stop reason: SDUPTHER

## 2021-06-22 ENCOUNTER — OUTPATIENT CASE MANAGEMENT (OUTPATIENT)
Dept: ADMINISTRATIVE | Facility: OTHER | Age: 66
End: 2021-06-22

## 2021-06-23 ENCOUNTER — OFFICE VISIT (OUTPATIENT)
Dept: INTERNAL MEDICINE | Facility: CLINIC | Age: 66
End: 2021-06-23
Payer: MEDICARE

## 2021-06-23 VITALS
TEMPERATURE: 98 F | SYSTOLIC BLOOD PRESSURE: 140 MMHG | BODY MASS INDEX: 31.92 KG/M2 | DIASTOLIC BLOOD PRESSURE: 102 MMHG | OXYGEN SATURATION: 95 % | HEIGHT: 65 IN | RESPIRATION RATE: 17 BRPM | HEART RATE: 99 BPM | WEIGHT: 191.56 LBS

## 2021-06-23 DIAGNOSIS — Z12.11 COLON CANCER SCREENING: Primary | ICD-10-CM

## 2021-06-23 DIAGNOSIS — Z01.818 PREOP EXAMINATION: ICD-10-CM

## 2021-06-23 DIAGNOSIS — F33.1 MDD (MAJOR DEPRESSIVE DISORDER), RECURRENT EPISODE, MODERATE: ICD-10-CM

## 2021-06-23 DIAGNOSIS — L97.529 ULCER OF LEFT FOOT, UNSPECIFIED ULCER STAGE: ICD-10-CM

## 2021-06-23 DIAGNOSIS — Z91.89 AT RISK FOR MEDICATION NONCOMPLIANCE: ICD-10-CM

## 2021-06-23 DIAGNOSIS — I50.42 CHRONIC COMBINED SYSTOLIC AND DIASTOLIC CONGESTIVE HEART FAILURE: Chronic | ICD-10-CM

## 2021-06-23 DIAGNOSIS — E66.9 OBESITY (BMI 30.0-34.9): ICD-10-CM

## 2021-06-23 PROBLEM — R53.81 PHYSICAL DECONDITIONING: Status: RESOLVED | Noted: 2019-11-07 | Resolved: 2021-06-23

## 2021-06-23 PROBLEM — N17.9 AKI (ACUTE KIDNEY INJURY): Status: RESOLVED | Noted: 2019-08-07 | Resolved: 2021-06-23

## 2021-06-23 PROBLEM — R07.9 CHEST PAIN: Status: RESOLVED | Noted: 2021-06-17 | Resolved: 2021-06-23

## 2021-06-23 PROCEDURE — 99999 PR PBB SHADOW E&M-EST. PATIENT-LVL V: ICD-10-PCS | Mod: PBBFAC,,, | Performed by: FAMILY MEDICINE

## 2021-06-23 PROCEDURE — 1125F AMNT PAIN NOTED PAIN PRSNT: CPT | Mod: S$GLB,,, | Performed by: FAMILY MEDICINE

## 2021-06-23 PROCEDURE — 1125F PR PAIN SEVERITY QUANTIFIED, PAIN PRESENT: ICD-10-PCS | Mod: S$GLB,,, | Performed by: FAMILY MEDICINE

## 2021-06-23 PROCEDURE — 99215 OFFICE O/P EST HI 40 MIN: CPT | Mod: S$GLB,,, | Performed by: FAMILY MEDICINE

## 2021-06-23 PROCEDURE — 3008F BODY MASS INDEX DOCD: CPT | Mod: CPTII,S$GLB,, | Performed by: FAMILY MEDICINE

## 2021-06-23 PROCEDURE — 99999 PR PBB SHADOW E&M-EST. PATIENT-LVL V: CPT | Mod: PBBFAC,,, | Performed by: FAMILY MEDICINE

## 2021-06-23 PROCEDURE — 99215 PR OFFICE/OUTPT VISIT, EST, LEVL V, 40-54 MIN: ICD-10-PCS | Mod: S$GLB,,, | Performed by: FAMILY MEDICINE

## 2021-06-23 PROCEDURE — 99499 RISK ADDL DX/OHS AUDIT: ICD-10-PCS | Mod: S$GLB,,, | Performed by: FAMILY MEDICINE

## 2021-06-23 PROCEDURE — 3008F PR BODY MASS INDEX (BMI) DOCUMENTED: ICD-10-PCS | Mod: CPTII,S$GLB,, | Performed by: FAMILY MEDICINE

## 2021-06-23 PROCEDURE — 99499 UNLISTED E&M SERVICE: CPT | Mod: S$GLB,,, | Performed by: FAMILY MEDICINE

## 2021-06-23 RX ORDER — HYDROCODONE BITARTRATE AND ACETAMINOPHEN 7.5; 325 MG/1; MG/1
1 TABLET ORAL EVERY 6 HOURS PRN
Qty: 28 TABLET | Refills: 0 | Status: SHIPPED | OUTPATIENT
Start: 2021-06-23 | End: 2021-07-07 | Stop reason: SDUPTHER

## 2021-06-24 ENCOUNTER — TELEPHONE (OUTPATIENT)
Dept: INTERNAL MEDICINE | Facility: CLINIC | Age: 66
End: 2021-06-24

## 2021-06-24 ENCOUNTER — PATIENT MESSAGE (OUTPATIENT)
Dept: PHARMACY | Facility: CLINIC | Age: 66
End: 2021-06-24

## 2021-06-28 ENCOUNTER — OUTPATIENT CASE MANAGEMENT (OUTPATIENT)
Dept: ADMINISTRATIVE | Facility: OTHER | Age: 66
End: 2021-06-28

## 2021-06-28 ENCOUNTER — TELEPHONE (OUTPATIENT)
Dept: PRIMARY CARE CLINIC | Facility: CLINIC | Age: 66
End: 2021-06-28

## 2021-06-29 ENCOUNTER — TELEPHONE (OUTPATIENT)
Dept: INTERNAL MEDICINE | Facility: CLINIC | Age: 66
End: 2021-06-29

## 2021-06-29 ENCOUNTER — CLINICAL SUPPORT (OUTPATIENT)
Dept: PRIMARY CARE CLINIC | Facility: CLINIC | Age: 66
End: 2021-06-29
Payer: MEDICARE

## 2021-06-29 DIAGNOSIS — F41.1 GAD (GENERALIZED ANXIETY DISORDER): ICD-10-CM

## 2021-06-29 DIAGNOSIS — G89.29 CHRONIC LEFT SHOULDER PAIN: ICD-10-CM

## 2021-06-29 DIAGNOSIS — M25.512 CHRONIC LEFT SHOULDER PAIN: ICD-10-CM

## 2021-06-29 DIAGNOSIS — G89.29 OTHER CHRONIC PAIN: ICD-10-CM

## 2021-06-29 DIAGNOSIS — F33.1 MDD (MAJOR DEPRESSIVE DISORDER), RECURRENT EPISODE, MODERATE: Primary | ICD-10-CM

## 2021-06-29 PROCEDURE — 90834 PSYTX W PT 45 MINUTES: CPT | Mod: BHI,95,, | Performed by: SOCIAL WORKER

## 2021-06-29 PROCEDURE — 90834 PR PSYCHOTHERAPY W/PATIENT, 45 MIN: ICD-10-PCS | Mod: BHI,95,, | Performed by: SOCIAL WORKER

## 2021-06-30 ENCOUNTER — TELEPHONE (OUTPATIENT)
Dept: DERMATOLOGY | Facility: CLINIC | Age: 66
End: 2021-06-30

## 2021-06-30 ENCOUNTER — OUTPATIENT CASE MANAGEMENT (OUTPATIENT)
Dept: ADMINISTRATIVE | Facility: OTHER | Age: 66
End: 2021-06-30

## 2021-07-02 RX ORDER — FUROSEMIDE 40 MG/1
TABLET ORAL
Qty: 270 TABLET | Refills: 4 | Status: SHIPPED | OUTPATIENT
Start: 2021-07-02 | End: 2021-08-03 | Stop reason: SDUPTHER

## 2021-07-06 ENCOUNTER — PATIENT OUTREACH (OUTPATIENT)
Dept: ADMINISTRATIVE | Facility: OTHER | Age: 66
End: 2021-07-06

## 2021-07-07 ENCOUNTER — OFFICE VISIT (OUTPATIENT)
Dept: CARDIOLOGY | Facility: CLINIC | Age: 66
End: 2021-07-07
Payer: MEDICARE

## 2021-07-07 VITALS
WEIGHT: 189.63 LBS | DIASTOLIC BLOOD PRESSURE: 90 MMHG | BODY MASS INDEX: 31.55 KG/M2 | HEART RATE: 93 BPM | OXYGEN SATURATION: 98 % | SYSTOLIC BLOOD PRESSURE: 148 MMHG

## 2021-07-07 DIAGNOSIS — I15.2 HYPERTENSION ASSOCIATED WITH DIABETES: Chronic | ICD-10-CM

## 2021-07-07 DIAGNOSIS — I25.110 ATHEROSCLEROSIS OF NATIVE CORONARY ARTERY OF NATIVE HEART WITH UNSTABLE ANGINA PECTORIS: ICD-10-CM

## 2021-07-07 DIAGNOSIS — E11.59 HYPERTENSION ASSOCIATED WITH DIABETES: Chronic | ICD-10-CM

## 2021-07-07 DIAGNOSIS — I48.0 PAF (PAROXYSMAL ATRIAL FIBRILLATION): Chronic | ICD-10-CM

## 2021-07-07 DIAGNOSIS — E11.69 HYPERLIPIDEMIA ASSOCIATED WITH TYPE 2 DIABETES MELLITUS: ICD-10-CM

## 2021-07-07 DIAGNOSIS — I25.118 CORONARY ARTERY DISEASE OF NATIVE ARTERY OF NATIVE HEART WITH STABLE ANGINA PECTORIS: Primary | Chronic | ICD-10-CM

## 2021-07-07 DIAGNOSIS — I50.42 CHRONIC COMBINED SYSTOLIC AND DIASTOLIC CONGESTIVE HEART FAILURE: ICD-10-CM

## 2021-07-07 DIAGNOSIS — E78.5 HYPERLIPIDEMIA ASSOCIATED WITH TYPE 2 DIABETES MELLITUS: ICD-10-CM

## 2021-07-07 DIAGNOSIS — R79.89 ELEVATED TROPONIN: ICD-10-CM

## 2021-07-07 PROCEDURE — 3008F BODY MASS INDEX DOCD: CPT | Mod: CPTII,S$GLB,, | Performed by: INTERNAL MEDICINE

## 2021-07-07 PROCEDURE — 99999 PR PBB SHADOW E&M-EST. PATIENT-LVL V: CPT | Mod: PBBFAC,,, | Performed by: INTERNAL MEDICINE

## 2021-07-07 PROCEDURE — 99214 PR OFFICE/OUTPT VISIT, EST, LEVL IV, 30-39 MIN: ICD-10-PCS | Mod: S$GLB,,, | Performed by: INTERNAL MEDICINE

## 2021-07-07 PROCEDURE — 3008F PR BODY MASS INDEX (BMI) DOCUMENTED: ICD-10-PCS | Mod: CPTII,S$GLB,, | Performed by: INTERNAL MEDICINE

## 2021-07-07 PROCEDURE — 99499 UNLISTED E&M SERVICE: CPT | Mod: S$PBB,,, | Performed by: INTERNAL MEDICINE

## 2021-07-07 PROCEDURE — 99499 RISK ADDL DX/OHS AUDIT: ICD-10-PCS | Mod: S$PBB,,, | Performed by: INTERNAL MEDICINE

## 2021-07-07 PROCEDURE — 99214 OFFICE O/P EST MOD 30 MIN: CPT | Mod: S$GLB,,, | Performed by: INTERNAL MEDICINE

## 2021-07-07 PROCEDURE — 99999 PR PBB SHADOW E&M-EST. PATIENT-LVL V: ICD-10-PCS | Mod: PBBFAC,,, | Performed by: INTERNAL MEDICINE

## 2021-07-07 RX ORDER — AMLODIPINE BESYLATE 2.5 MG/1
2.5 TABLET ORAL DAILY
Qty: 30 TABLET | Refills: 11 | Status: SHIPPED | OUTPATIENT
Start: 2021-07-07 | End: 2021-08-03 | Stop reason: SDUPTHER

## 2021-07-07 RX ORDER — ISOSORBIDE MONONITRATE 60 MG/1
60 TABLET, EXTENDED RELEASE ORAL 2 TIMES DAILY
Qty: 180 TABLET | Refills: 3 | Status: SHIPPED | OUTPATIENT
Start: 2021-07-07 | End: 2021-08-03 | Stop reason: SDUPTHER

## 2021-07-07 RX ORDER — NITROGLYCERIN 0.4 MG/1
0.4 TABLET SUBLINGUAL EVERY 5 MIN PRN
Qty: 50 TABLET | Refills: 12 | Status: SHIPPED | OUTPATIENT
Start: 2021-07-07 | End: 2022-03-05

## 2021-07-08 ENCOUNTER — OFFICE VISIT (OUTPATIENT)
Dept: INTERNAL MEDICINE | Facility: CLINIC | Age: 66
End: 2021-07-08
Payer: MEDICARE

## 2021-07-08 ENCOUNTER — LAB VISIT (OUTPATIENT)
Dept: LAB | Facility: HOSPITAL | Age: 66
End: 2021-07-08
Attending: FAMILY MEDICINE
Payer: MEDICARE

## 2021-07-08 VITALS
WEIGHT: 191.38 LBS | HEIGHT: 65 IN | OXYGEN SATURATION: 95 % | DIASTOLIC BLOOD PRESSURE: 68 MMHG | SYSTOLIC BLOOD PRESSURE: 114 MMHG | TEMPERATURE: 98 F | HEART RATE: 75 BPM | RESPIRATION RATE: 16 BRPM | BODY MASS INDEX: 31.89 KG/M2

## 2021-07-08 DIAGNOSIS — I15.2 HYPERTENSION ASSOCIATED WITH DIABETES: Chronic | ICD-10-CM

## 2021-07-08 DIAGNOSIS — M47.26 OSTEOARTHRITIS OF SPINE WITH RADICULOPATHY, LUMBAR REGION: ICD-10-CM

## 2021-07-08 DIAGNOSIS — E11.59 HYPERTENSION ASSOCIATED WITH DIABETES: Chronic | ICD-10-CM

## 2021-07-08 DIAGNOSIS — M75.122 COMPLETE TEAR OF LEFT ROTATOR CUFF, UNSPECIFIED WHETHER TRAUMATIC: ICD-10-CM

## 2021-07-08 PROCEDURE — 83036 HEMOGLOBIN GLYCOSYLATED A1C: CPT | Performed by: FAMILY MEDICINE

## 2021-07-08 PROCEDURE — 3008F PR BODY MASS INDEX (BMI) DOCUMENTED: ICD-10-PCS | Mod: CPTII,S$GLB,, | Performed by: FAMILY MEDICINE

## 2021-07-08 PROCEDURE — 1125F PR PAIN SEVERITY QUANTIFIED, PAIN PRESENT: ICD-10-PCS | Mod: S$GLB,,, | Performed by: FAMILY MEDICINE

## 2021-07-08 PROCEDURE — 82043 UR ALBUMIN QUANTITATIVE: CPT | Performed by: FAMILY MEDICINE

## 2021-07-08 PROCEDURE — 1125F AMNT PAIN NOTED PAIN PRSNT: CPT | Mod: S$GLB,,, | Performed by: FAMILY MEDICINE

## 2021-07-08 PROCEDURE — 36415 COLL VENOUS BLD VENIPUNCTURE: CPT | Mod: PO | Performed by: FAMILY MEDICINE

## 2021-07-08 PROCEDURE — 80307 DRUG TEST PRSMV CHEM ANLYZR: CPT | Performed by: FAMILY MEDICINE

## 2021-07-08 PROCEDURE — 3008F BODY MASS INDEX DOCD: CPT | Mod: CPTII,S$GLB,, | Performed by: FAMILY MEDICINE

## 2021-07-08 PROCEDURE — 99999 PR PBB SHADOW E&M-EST. PATIENT-LVL III: CPT | Mod: PBBFAC,,, | Performed by: FAMILY MEDICINE

## 2021-07-08 PROCEDURE — 99214 PR OFFICE/OUTPT VISIT, EST, LEVL IV, 30-39 MIN: ICD-10-PCS | Mod: S$GLB,,, | Performed by: FAMILY MEDICINE

## 2021-07-08 PROCEDURE — 99999 PR PBB SHADOW E&M-EST. PATIENT-LVL III: ICD-10-PCS | Mod: PBBFAC,,, | Performed by: FAMILY MEDICINE

## 2021-07-08 PROCEDURE — 3046F HEMOGLOBIN A1C LEVEL >9.0%: CPT | Mod: CPTII,S$GLB,, | Performed by: FAMILY MEDICINE

## 2021-07-08 PROCEDURE — 82570 ASSAY OF URINE CREATININE: CPT | Performed by: FAMILY MEDICINE

## 2021-07-08 PROCEDURE — 3046F PR MOST RECENT HEMOGLOBIN A1C LEVEL > 9.0%: ICD-10-PCS | Mod: CPTII,S$GLB,, | Performed by: FAMILY MEDICINE

## 2021-07-08 PROCEDURE — 99214 OFFICE O/P EST MOD 30 MIN: CPT | Mod: S$GLB,,, | Performed by: FAMILY MEDICINE

## 2021-07-08 RX ORDER — HYDROCODONE BITARTRATE AND ACETAMINOPHEN 7.5; 325 MG/1; MG/1
1 TABLET ORAL EVERY 6 HOURS PRN
Qty: 28 TABLET | Refills: 0 | Status: SHIPPED | OUTPATIENT
Start: 2021-07-08 | End: 2021-07-26

## 2021-07-09 LAB
ALBUMIN/CREAT UR: 421.6 UG/MG (ref 0–30)
CREAT UR-MCNC: 319 MG/DL (ref 23–375)
ESTIMATED AVG GLUCOSE: 146 MG/DL (ref 68–131)
HBA1C MFR BLD: 6.7 % (ref 4–5.6)
MICROALBUMIN UR DL<=1MG/L-MCNC: 1345 UG/ML

## 2021-07-12 ENCOUNTER — OUTPATIENT CASE MANAGEMENT (OUTPATIENT)
Dept: ADMINISTRATIVE | Facility: OTHER | Age: 66
End: 2021-07-12

## 2021-07-12 ENCOUNTER — TELEPHONE (OUTPATIENT)
Dept: PRIMARY CARE CLINIC | Facility: CLINIC | Age: 66
End: 2021-07-12

## 2021-07-13 ENCOUNTER — CLINICAL SUPPORT (OUTPATIENT)
Dept: PRIMARY CARE CLINIC | Facility: CLINIC | Age: 66
End: 2021-07-13
Payer: MEDICARE

## 2021-07-13 ENCOUNTER — OUTPATIENT CASE MANAGEMENT (OUTPATIENT)
Dept: ADMINISTRATIVE | Facility: OTHER | Age: 66
End: 2021-07-13

## 2021-07-13 ENCOUNTER — PES CALL (OUTPATIENT)
Dept: ADMINISTRATIVE | Facility: CLINIC | Age: 66
End: 2021-07-13

## 2021-07-13 DIAGNOSIS — F41.1 GAD (GENERALIZED ANXIETY DISORDER): ICD-10-CM

## 2021-07-13 DIAGNOSIS — F33.1 MDD (MAJOR DEPRESSIVE DISORDER), RECURRENT EPISODE, MODERATE: Primary | ICD-10-CM

## 2021-07-13 DIAGNOSIS — G89.29 OTHER CHRONIC PAIN: ICD-10-CM

## 2021-07-13 DIAGNOSIS — G89.29 CHRONIC LEFT SHOULDER PAIN: ICD-10-CM

## 2021-07-13 DIAGNOSIS — M25.512 CHRONIC LEFT SHOULDER PAIN: ICD-10-CM

## 2021-07-13 LAB
6MAM UR QL: NOT DETECTED
7AMINOCLONAZEPAM UR QL: NOT DETECTED
A-OH ALPRAZ UR QL: NOT DETECTED
ALPHA-OH-MIDAZOLAM: NOT DETECTED
ALPRAZ UR QL: NOT DETECTED
AMPHET UR QL SCN: NOT DETECTED
ANNOTATION COMMENT IMP: NORMAL
ANNOTATION COMMENT IMP: NORMAL
BARBITURATES UR QL: NOT DETECTED
BUPRENORPHINE UR QL: NOT DETECTED
BZE UR QL: PRESENT
CARBOXYTHC UR QL: NOT DETECTED
CARISOPRODOL UR QL: PRESENT
CLONAZEPAM UR QL: NOT DETECTED
CODEINE UR QL: NOT DETECTED
CREAT UR-MCNC: 303.6 MG/DL (ref 20–400)
DIAZEPAM UR QL: NOT DETECTED
ETHYL GLUCURONIDE UR QL: NOT DETECTED
FENTANYL UR QL: NOT DETECTED
GABAPENTIN: PRESENT
HYDROCODONE UR QL: NOT DETECTED
HYDROMORPHONE UR QL: NOT DETECTED
LORAZEPAM UR QL: NOT DETECTED
MDA UR QL: NOT DETECTED
MDEA UR QL: NOT DETECTED
MDMA UR QL: NOT DETECTED
ME-PHENIDATE UR QL: NOT DETECTED
METHADONE UR QL: NOT DETECTED
METHAMPHET UR QL: NOT DETECTED
MIDAZOLAM UR QL SCN: NOT DETECTED
MORPHINE UR QL: NOT DETECTED
NALOXONE: NOT DETECTED
NORBUPRENORPHINE UR QL CFM: NOT DETECTED
NORDIAZEPAM UR QL: NOT DETECTED
NORFENTANYL UR QL: NOT DETECTED
NORHYDROCODONE UR QL CFM: NOT DETECTED
NORMEPERIDINE UR QL CFM: NOT DETECTED
NOROXYCODONE UR QL CFM: NOT DETECTED
NOROXYMORPHONE UR QL SCN: NOT DETECTED
OXAZEPAM UR QL: NOT DETECTED
OXYCODONE UR QL: NOT DETECTED
OXYMORPHONE UR QL: NOT DETECTED
PATHOLOGY STUDY: NORMAL
PCP UR QL: NOT DETECTED
PHENTERMINE UR QL: NOT DETECTED
PREGABALIN: NOT DETECTED
SERVICE CMNT-IMP: NORMAL
TAPENTADOL UR QL SCN: NOT DETECTED
TAPENTADOL-O-SULF: NOT DETECTED
TEMAZEPAM UR QL: NOT DETECTED
TRAMADOL UR QL: NOT DETECTED
ZOLPIDEM METABOLITE: NOT DETECTED
ZOLPIDEM UR QL: NOT DETECTED

## 2021-07-13 PROCEDURE — 90834 PSYTX W PT 45 MINUTES: CPT | Mod: BHI,95,, | Performed by: SOCIAL WORKER

## 2021-07-13 PROCEDURE — 90834 PR PSYCHOTHERAPY W/PATIENT, 45 MIN: ICD-10-PCS | Mod: BHI,95,, | Performed by: SOCIAL WORKER

## 2021-07-14 ENCOUNTER — SPECIALTY PHARMACY (OUTPATIENT)
Dept: PHARMACY | Facility: CLINIC | Age: 66
End: 2021-07-14

## 2021-07-14 ENCOUNTER — TELEPHONE (OUTPATIENT)
Dept: INTERNAL MEDICINE | Facility: CLINIC | Age: 66
End: 2021-07-14

## 2021-07-19 ENCOUNTER — OUTPATIENT CASE MANAGEMENT (OUTPATIENT)
Dept: ADMINISTRATIVE | Facility: OTHER | Age: 66
End: 2021-07-19

## 2021-07-26 ENCOUNTER — TELEPHONE (OUTPATIENT)
Dept: INTERNAL MEDICINE | Facility: CLINIC | Age: 66
End: 2021-07-26

## 2021-07-26 ENCOUNTER — OFFICE VISIT (OUTPATIENT)
Dept: INTERNAL MEDICINE | Facility: CLINIC | Age: 66
End: 2021-07-26
Payer: MEDICARE

## 2021-07-26 ENCOUNTER — TELEPHONE (OUTPATIENT)
Dept: PRIMARY CARE CLINIC | Facility: CLINIC | Age: 66
End: 2021-07-26

## 2021-07-26 VITALS
OXYGEN SATURATION: 96 % | SYSTOLIC BLOOD PRESSURE: 98 MMHG | HEIGHT: 65 IN | BODY MASS INDEX: 32.44 KG/M2 | DIASTOLIC BLOOD PRESSURE: 70 MMHG | TEMPERATURE: 98 F | HEART RATE: 84 BPM | RESPIRATION RATE: 15 BRPM | WEIGHT: 194.69 LBS

## 2021-07-26 DIAGNOSIS — M25.512 CHRONIC LEFT SHOULDER PAIN: ICD-10-CM

## 2021-07-26 DIAGNOSIS — Z12.5 PROSTATE CANCER SCREENING: Primary | ICD-10-CM

## 2021-07-26 DIAGNOSIS — I48.91 ATRIAL FIBRILLATION WITH RVR: ICD-10-CM

## 2021-07-26 DIAGNOSIS — Z91.89 AT RISK FOR MEDICATION NONCOMPLIANCE: ICD-10-CM

## 2021-07-26 DIAGNOSIS — E11.42 DIABETIC POLYNEUROPATHY ASSOCIATED WITH TYPE 2 DIABETES MELLITUS: ICD-10-CM

## 2021-07-26 DIAGNOSIS — G89.29 CHRONIC LEFT SHOULDER PAIN: ICD-10-CM

## 2021-07-26 DIAGNOSIS — G89.29 OTHER CHRONIC PAIN: ICD-10-CM

## 2021-07-26 DIAGNOSIS — I50.42 CHRONIC COMBINED SYSTOLIC AND DIASTOLIC CONGESTIVE HEART FAILURE: ICD-10-CM

## 2021-07-26 PROCEDURE — 99214 OFFICE O/P EST MOD 30 MIN: CPT | Mod: S$GLB,,, | Performed by: FAMILY MEDICINE

## 2021-07-26 PROCEDURE — 3074F PR MOST RECENT SYSTOLIC BLOOD PRESSURE < 130 MM HG: ICD-10-PCS | Mod: CPTII,S$GLB,, | Performed by: FAMILY MEDICINE

## 2021-07-26 PROCEDURE — 3078F DIAST BP <80 MM HG: CPT | Mod: CPTII,S$GLB,, | Performed by: FAMILY MEDICINE

## 2021-07-26 PROCEDURE — 3008F PR BODY MASS INDEX (BMI) DOCUMENTED: ICD-10-PCS | Mod: CPTII,S$GLB,, | Performed by: FAMILY MEDICINE

## 2021-07-26 PROCEDURE — 3044F PR MOST RECENT HEMOGLOBIN A1C LEVEL <7.0%: ICD-10-PCS | Mod: CPTII,S$GLB,, | Performed by: FAMILY MEDICINE

## 2021-07-26 PROCEDURE — 3008F BODY MASS INDEX DOCD: CPT | Mod: CPTII,S$GLB,, | Performed by: FAMILY MEDICINE

## 2021-07-26 PROCEDURE — 99999 PR PBB SHADOW E&M-EST. PATIENT-LVL V: CPT | Mod: PBBFAC,,, | Performed by: FAMILY MEDICINE

## 2021-07-26 PROCEDURE — 1125F AMNT PAIN NOTED PAIN PRSNT: CPT | Mod: CPTII,S$GLB,, | Performed by: FAMILY MEDICINE

## 2021-07-26 PROCEDURE — 1159F MED LIST DOCD IN RCRD: CPT | Mod: CPTII,S$GLB,, | Performed by: FAMILY MEDICINE

## 2021-07-26 PROCEDURE — 99999 PR PBB SHADOW E&M-EST. PATIENT-LVL V: ICD-10-PCS | Mod: PBBFAC,,, | Performed by: FAMILY MEDICINE

## 2021-07-26 PROCEDURE — 3044F HG A1C LEVEL LT 7.0%: CPT | Mod: CPTII,S$GLB,, | Performed by: FAMILY MEDICINE

## 2021-07-26 PROCEDURE — 1125F PR PAIN SEVERITY QUANTIFIED, PAIN PRESENT: ICD-10-PCS | Mod: CPTII,S$GLB,, | Performed by: FAMILY MEDICINE

## 2021-07-26 PROCEDURE — 99214 PR OFFICE/OUTPT VISIT, EST, LEVL IV, 30-39 MIN: ICD-10-PCS | Mod: S$GLB,,, | Performed by: FAMILY MEDICINE

## 2021-07-26 PROCEDURE — 3074F SYST BP LT 130 MM HG: CPT | Mod: CPTII,S$GLB,, | Performed by: FAMILY MEDICINE

## 2021-07-26 PROCEDURE — 1159F PR MEDICATION LIST DOCUMENTED IN MEDICAL RECORD: ICD-10-PCS | Mod: CPTII,S$GLB,, | Performed by: FAMILY MEDICINE

## 2021-07-26 PROCEDURE — 3078F PR MOST RECENT DIASTOLIC BLOOD PRESSURE < 80 MM HG: ICD-10-PCS | Mod: CPTII,S$GLB,, | Performed by: FAMILY MEDICINE

## 2021-07-26 RX ORDER — NAPROXEN 500 MG/1
500 TABLET ORAL 2 TIMES DAILY WITH MEALS
Qty: 28 TABLET | Refills: 0 | Status: SHIPPED | OUTPATIENT
Start: 2021-07-26 | End: 2021-08-09

## 2021-07-27 ENCOUNTER — CLINICAL SUPPORT (OUTPATIENT)
Dept: PRIMARY CARE CLINIC | Facility: CLINIC | Age: 66
End: 2021-07-27
Payer: MEDICARE

## 2021-07-27 DIAGNOSIS — G89.29 OTHER CHRONIC PAIN: ICD-10-CM

## 2021-07-27 DIAGNOSIS — F33.1 MDD (MAJOR DEPRESSIVE DISORDER), RECURRENT EPISODE, MODERATE: Primary | ICD-10-CM

## 2021-07-27 DIAGNOSIS — F41.1 GAD (GENERALIZED ANXIETY DISORDER): ICD-10-CM

## 2021-07-27 DIAGNOSIS — G89.29 CHRONIC LEFT SHOULDER PAIN: ICD-10-CM

## 2021-07-27 DIAGNOSIS — M25.512 CHRONIC LEFT SHOULDER PAIN: ICD-10-CM

## 2021-07-27 PROCEDURE — 90834 PSYTX W PT 45 MINUTES: CPT | Mod: BHI,95,, | Performed by: SOCIAL WORKER

## 2021-07-27 PROCEDURE — 90834 PR PSYCHOTHERAPY W/PATIENT, 45 MIN: ICD-10-PCS | Mod: BHI,95,, | Performed by: SOCIAL WORKER

## 2021-07-29 ENCOUNTER — OFFICE VISIT (OUTPATIENT)
Dept: DERMATOLOGY | Facility: CLINIC | Age: 66
End: 2021-07-29
Payer: MEDICARE

## 2021-07-29 ENCOUNTER — TELEPHONE (OUTPATIENT)
Dept: INTERNAL MEDICINE | Facility: CLINIC | Age: 66
End: 2021-07-29

## 2021-07-29 DIAGNOSIS — L30.9 DERMATITIS: Primary | ICD-10-CM

## 2021-07-29 PROCEDURE — 3044F PR MOST RECENT HEMOGLOBIN A1C LEVEL <7.0%: ICD-10-PCS | Mod: CPTII,S$GLB,, | Performed by: STUDENT IN AN ORGANIZED HEALTH CARE EDUCATION/TRAINING PROGRAM

## 2021-07-29 PROCEDURE — 1126F AMNT PAIN NOTED NONE PRSNT: CPT | Mod: CPTII,S$GLB,, | Performed by: STUDENT IN AN ORGANIZED HEALTH CARE EDUCATION/TRAINING PROGRAM

## 2021-07-29 PROCEDURE — 99999 PR PBB SHADOW E&M-EST. PATIENT-LVL IV: ICD-10-PCS | Mod: PBBFAC,,, | Performed by: STUDENT IN AN ORGANIZED HEALTH CARE EDUCATION/TRAINING PROGRAM

## 2021-07-29 PROCEDURE — 1160F PR REVIEW ALL MEDS BY PRESCRIBER/CLIN PHARMACIST DOCUMENTED: ICD-10-PCS | Mod: CPTII,S$GLB,, | Performed by: STUDENT IN AN ORGANIZED HEALTH CARE EDUCATION/TRAINING PROGRAM

## 2021-07-29 PROCEDURE — 1126F PR PAIN SEVERITY QUANTIFIED, NO PAIN PRESENT: ICD-10-PCS | Mod: CPTII,S$GLB,, | Performed by: STUDENT IN AN ORGANIZED HEALTH CARE EDUCATION/TRAINING PROGRAM

## 2021-07-29 PROCEDURE — 1160F RVW MEDS BY RX/DR IN RCRD: CPT | Mod: CPTII,S$GLB,, | Performed by: STUDENT IN AN ORGANIZED HEALTH CARE EDUCATION/TRAINING PROGRAM

## 2021-07-29 PROCEDURE — 99214 OFFICE O/P EST MOD 30 MIN: CPT | Mod: S$GLB,,, | Performed by: STUDENT IN AN ORGANIZED HEALTH CARE EDUCATION/TRAINING PROGRAM

## 2021-07-29 PROCEDURE — 1159F MED LIST DOCD IN RCRD: CPT | Mod: CPTII,S$GLB,, | Performed by: STUDENT IN AN ORGANIZED HEALTH CARE EDUCATION/TRAINING PROGRAM

## 2021-07-29 PROCEDURE — 99214 PR OFFICE/OUTPT VISIT, EST, LEVL IV, 30-39 MIN: ICD-10-PCS | Mod: S$GLB,,, | Performed by: STUDENT IN AN ORGANIZED HEALTH CARE EDUCATION/TRAINING PROGRAM

## 2021-07-29 PROCEDURE — 99999 PR PBB SHADOW E&M-EST. PATIENT-LVL IV: CPT | Mod: PBBFAC,,, | Performed by: STUDENT IN AN ORGANIZED HEALTH CARE EDUCATION/TRAINING PROGRAM

## 2021-07-29 PROCEDURE — 3044F HG A1C LEVEL LT 7.0%: CPT | Mod: CPTII,S$GLB,, | Performed by: STUDENT IN AN ORGANIZED HEALTH CARE EDUCATION/TRAINING PROGRAM

## 2021-07-29 PROCEDURE — 1159F PR MEDICATION LIST DOCUMENTED IN MEDICAL RECORD: ICD-10-PCS | Mod: CPTII,S$GLB,, | Performed by: STUDENT IN AN ORGANIZED HEALTH CARE EDUCATION/TRAINING PROGRAM

## 2021-07-29 RX ORDER — TACROLIMUS 1 MG/G
OINTMENT TOPICAL 2 TIMES DAILY
Qty: 100 G | Refills: 2 | Status: SHIPPED | OUTPATIENT
Start: 2021-07-29 | End: 2022-09-01

## 2021-07-29 RX ORDER — BETAMETHASONE VALERATE 1.2 MG/G
OINTMENT TOPICAL 2 TIMES DAILY
Qty: 45 G | Refills: 2 | OUTPATIENT
Start: 2021-07-29 | End: 2021-08-03

## 2021-07-29 RX ORDER — PREDNISONE 10 MG/1
TABLET ORAL
Qty: 18 TABLET | Refills: 0 | OUTPATIENT
Start: 2021-07-29 | End: 2021-08-03

## 2021-08-02 ENCOUNTER — TELEPHONE (OUTPATIENT)
Dept: INTERNAL MEDICINE | Facility: CLINIC | Age: 66
End: 2021-08-02

## 2021-08-02 ENCOUNTER — HOSPITAL ENCOUNTER (OUTPATIENT)
Facility: HOSPITAL | Age: 66
Discharge: HOME | End: 2021-08-03
Attending: FAMILY MEDICINE | Admitting: FAMILY MEDICINE
Payer: MEDICARE

## 2021-08-02 DIAGNOSIS — R07.9 CHEST PAIN: Primary | ICD-10-CM

## 2021-08-02 DIAGNOSIS — F14.10 COCAINE ABUSE: ICD-10-CM

## 2021-08-02 DIAGNOSIS — I48.0 PAF (PAROXYSMAL ATRIAL FIBRILLATION): Chronic | ICD-10-CM

## 2021-08-02 DIAGNOSIS — I50.42 CHRONIC COMBINED SYSTOLIC AND DIASTOLIC CONGESTIVE HEART FAILURE: Chronic | ICD-10-CM

## 2021-08-02 DIAGNOSIS — I20.1 CORONARY VASOSPASM: ICD-10-CM

## 2021-08-02 DIAGNOSIS — F14.21 COCAINE DEPENDENCE, IN REMISSION: ICD-10-CM

## 2021-08-02 DIAGNOSIS — R07.9 CHEST PAIN, UNSPECIFIED TYPE: ICD-10-CM

## 2021-08-02 DIAGNOSIS — K21.9 GASTROESOPHAGEAL REFLUX DISEASE: ICD-10-CM

## 2021-08-02 PROCEDURE — 99291 CRITICAL CARE FIRST HOUR: CPT | Mod: 25

## 2021-08-02 PROCEDURE — 93010 EKG 12-LEAD: ICD-10-PCS | Mod: ,,, | Performed by: INTERNAL MEDICINE

## 2021-08-02 PROCEDURE — 96374 THER/PROPH/DIAG INJ IV PUSH: CPT

## 2021-08-02 PROCEDURE — 93005 ELECTROCARDIOGRAM TRACING: CPT

## 2021-08-02 PROCEDURE — U0002 COVID-19 LAB TEST NON-CDC: HCPCS | Performed by: FAMILY MEDICINE

## 2021-08-02 PROCEDURE — 93010 ELECTROCARDIOGRAM REPORT: CPT | Mod: ,,, | Performed by: INTERNAL MEDICINE

## 2021-08-02 PROCEDURE — 96375 TX/PRO/DX INJ NEW DRUG ADDON: CPT

## 2021-08-02 RX ORDER — ASPIRIN 325 MG
325 TABLET ORAL
Status: DISPENSED | OUTPATIENT
Start: 2021-08-02 | End: 2021-08-03

## 2021-08-03 VITALS
WEIGHT: 194 LBS | BODY MASS INDEX: 32.32 KG/M2 | SYSTOLIC BLOOD PRESSURE: 157 MMHG | RESPIRATION RATE: 16 BRPM | HEART RATE: 82 BPM | OXYGEN SATURATION: 99 % | TEMPERATURE: 99 F | HEIGHT: 65 IN | DIASTOLIC BLOOD PRESSURE: 88 MMHG

## 2021-08-03 PROBLEM — I48.91 ATRIAL FIBRILLATION WITH RVR: Status: RESOLVED | Noted: 2020-02-27 | Resolved: 2021-08-03

## 2021-08-03 PROBLEM — I20.1 CORONARY VASOSPASM: Status: ACTIVE | Noted: 2021-08-03

## 2021-08-03 LAB
ALBUMIN SERPL BCP-MCNC: 3.6 G/DL (ref 3.5–5.2)
ALP SERPL-CCNC: 73 U/L (ref 55–135)
ALT SERPL W/O P-5'-P-CCNC: 23 U/L (ref 10–44)
AMPHET+METHAMPHET UR QL: NEGATIVE
ANION GAP SERPL CALC-SCNC: 14 MMOL/L (ref 8–16)
AST SERPL-CCNC: 27 U/L (ref 10–40)
BACTERIA #/AREA URNS HPF: NORMAL /HPF
BARBITURATES UR QL SCN>200 NG/ML: NEGATIVE
BASOPHILS # BLD AUTO: 0.02 K/UL (ref 0–0.2)
BASOPHILS NFR BLD: 0.2 % (ref 0–1.9)
BENZODIAZ UR QL SCN>200 NG/ML: NEGATIVE
BILIRUB SERPL-MCNC: 0.4 MG/DL (ref 0.1–1)
BILIRUB UR QL STRIP: NEGATIVE
BNP SERPL-MCNC: 129 PG/ML (ref 0–99)
BUN SERPL-MCNC: 14 MG/DL (ref 8–23)
BZE UR QL SCN: ABNORMAL
CALCIUM SERPL-MCNC: 9.8 MG/DL (ref 8.7–10.5)
CANNABINOIDS UR QL SCN: NEGATIVE
CHLORIDE SERPL-SCNC: 103 MMOL/L (ref 95–110)
CLARITY UR: CLEAR
CO2 SERPL-SCNC: 22 MMOL/L (ref 23–29)
COLOR UR: YELLOW
CREAT SERPL-MCNC: 1 MG/DL (ref 0.5–1.4)
CREAT UR-MCNC: 267.5 MG/DL (ref 23–375)
CTP QC/QA: YES
DIFFERENTIAL METHOD: ABNORMAL
EOSINOPHIL # BLD AUTO: 0.1 K/UL (ref 0–0.5)
EOSINOPHIL NFR BLD: 1.2 % (ref 0–8)
ERYTHROCYTE [DISTWIDTH] IN BLOOD BY AUTOMATED COUNT: 14.8 % (ref 11.5–14.5)
EST. GFR  (AFRICAN AMERICAN): >60 ML/MIN/1.73 M^2
EST. GFR  (NON AFRICAN AMERICAN): >60 ML/MIN/1.73 M^2
GLUCOSE SERPL-MCNC: 111 MG/DL (ref 70–110)
GLUCOSE UR QL STRIP: NEGATIVE
HCT VFR BLD AUTO: 37.6 % (ref 40–54)
HGB BLD-MCNC: 11.9 G/DL (ref 14–18)
HGB UR QL STRIP: NEGATIVE
HYALINE CASTS #/AREA URNS LPF: 1 /LPF
IMM GRANULOCYTES # BLD AUTO: 0.02 K/UL (ref 0–0.04)
IMM GRANULOCYTES NFR BLD AUTO: 0.2 % (ref 0–0.5)
INR PPP: 1 (ref 0.8–1.2)
KETONES UR QL STRIP: NEGATIVE
LEUKOCYTE ESTERASE UR QL STRIP: NEGATIVE
LIPASE SERPL-CCNC: 37 U/L (ref 4–60)
LYMPHOCYTES # BLD AUTO: 2.1 K/UL (ref 1–4.8)
LYMPHOCYTES NFR BLD: 24.3 % (ref 18–48)
MAGNESIUM SERPL-MCNC: 1.8 MG/DL (ref 1.6–2.6)
MCH RBC QN AUTO: 27.8 PG (ref 27–31)
MCHC RBC AUTO-ENTMCNC: 31.6 G/DL (ref 32–36)
MCV RBC AUTO: 88 FL (ref 82–98)
METHADONE UR QL SCN>300 NG/ML: NEGATIVE
MICROSCOPIC COMMENT: NORMAL
MONOCYTES # BLD AUTO: 0.5 K/UL (ref 0.3–1)
MONOCYTES NFR BLD: 6.1 % (ref 4–15)
NEUTROPHILS # BLD AUTO: 5.9 K/UL (ref 1.8–7.7)
NEUTROPHILS NFR BLD: 68 % (ref 38–73)
NITRITE UR QL STRIP: NEGATIVE
NRBC BLD-RTO: 0 /100 WBC
OPIATES UR QL SCN: NEGATIVE
PCP UR QL SCN>25 NG/ML: NEGATIVE
PH UR STRIP: 6 [PH] (ref 5–8)
PLATELET # BLD AUTO: 207 K/UL (ref 150–450)
PMV BLD AUTO: 9.3 FL (ref 9.2–12.9)
POCT GLUCOSE: 120 MG/DL (ref 70–110)
POCT GLUCOSE: 127 MG/DL (ref 70–110)
POTASSIUM SERPL-SCNC: 3.9 MMOL/L (ref 3.5–5.1)
PROT SERPL-MCNC: 8 G/DL (ref 6–8.4)
PROT UR QL STRIP: ABNORMAL
PROTHROMBIN TIME: 10.7 SEC (ref 9–12.5)
RBC # BLD AUTO: 4.28 M/UL (ref 4.6–6.2)
RBC #/AREA URNS HPF: 1 /HPF (ref 0–4)
SARS-COV-2 RDRP RESP QL NAA+PROBE: NEGATIVE
SODIUM SERPL-SCNC: 139 MMOL/L (ref 136–145)
SP GR UR STRIP: >=1.03 (ref 1–1.03)
TOXICOLOGY INFORMATION: ABNORMAL
TROPONIN I SERPL DL<=0.01 NG/ML-MCNC: 0.15 NG/ML (ref 0–0.03)
TROPONIN I SERPL DL<=0.01 NG/ML-MCNC: 0.16 NG/ML (ref 0–0.03)
TROPONIN I SERPL DL<=0.01 NG/ML-MCNC: 0.17 NG/ML (ref 0–0.03)
URN SPEC COLLECT METH UR: ABNORMAL
UROBILINOGEN UR STRIP-ACNC: 1 EU/DL
WBC # BLD AUTO: 8.69 K/UL (ref 3.9–12.7)
WBC #/AREA URNS HPF: 2 /HPF (ref 0–5)

## 2021-08-03 PROCEDURE — 63600175 PHARM REV CODE 636 W HCPCS: Performed by: FAMILY MEDICINE

## 2021-08-03 PROCEDURE — 80307 DRUG TEST PRSMV CHEM ANLYZR: CPT | Performed by: FAMILY MEDICINE

## 2021-08-03 PROCEDURE — 84484 ASSAY OF TROPONIN QUANT: CPT | Performed by: FAMILY MEDICINE

## 2021-08-03 PROCEDURE — 83690 ASSAY OF LIPASE: CPT | Performed by: FAMILY MEDICINE

## 2021-08-03 PROCEDURE — 25000003 PHARM REV CODE 250: Performed by: FAMILY MEDICINE

## 2021-08-03 PROCEDURE — 25000003 PHARM REV CODE 250: Performed by: NURSE PRACTITIONER

## 2021-08-03 PROCEDURE — G0378 HOSPITAL OBSERVATION PER HR: HCPCS

## 2021-08-03 PROCEDURE — 80053 COMPREHEN METABOLIC PANEL: CPT | Performed by: FAMILY MEDICINE

## 2021-08-03 PROCEDURE — 85610 PROTHROMBIN TIME: CPT | Performed by: NURSE PRACTITIONER

## 2021-08-03 PROCEDURE — 93010 ELECTROCARDIOGRAM REPORT: CPT | Mod: ,,, | Performed by: INTERNAL MEDICINE

## 2021-08-03 PROCEDURE — 93010 EKG 12-LEAD: ICD-10-PCS | Mod: ,,, | Performed by: INTERNAL MEDICINE

## 2021-08-03 PROCEDURE — 85025 COMPLETE CBC W/AUTO DIFF WBC: CPT | Performed by: NURSE PRACTITIONER

## 2021-08-03 PROCEDURE — 93005 ELECTROCARDIOGRAM TRACING: CPT

## 2021-08-03 PROCEDURE — 99220 PR INITIAL OBSERVATION CARE,LEVL III: ICD-10-PCS | Mod: 25,,, | Performed by: NURSE PRACTITIONER

## 2021-08-03 PROCEDURE — 63600175 PHARM REV CODE 636 W HCPCS: Performed by: NURSE PRACTITIONER

## 2021-08-03 PROCEDURE — 84484 ASSAY OF TROPONIN QUANT: CPT | Mod: 91 | Performed by: FAMILY MEDICINE

## 2021-08-03 PROCEDURE — 83880 ASSAY OF NATRIURETIC PEPTIDE: CPT | Performed by: FAMILY MEDICINE

## 2021-08-03 PROCEDURE — 83735 ASSAY OF MAGNESIUM: CPT | Performed by: FAMILY MEDICINE

## 2021-08-03 PROCEDURE — 99220 PR INITIAL OBSERVATION CARE,LEVL III: CPT | Mod: 25,,, | Performed by: NURSE PRACTITIONER

## 2021-08-03 PROCEDURE — 81000 URINALYSIS NONAUTO W/SCOPE: CPT | Mod: 59 | Performed by: FAMILY MEDICINE

## 2021-08-03 PROCEDURE — 84484 ASSAY OF TROPONIN QUANT: CPT | Mod: 91 | Performed by: NURSE PRACTITIONER

## 2021-08-03 RX ORDER — FLUTICASONE FUROATE AND VILANTEROL 100; 25 UG/1; UG/1
1 POWDER RESPIRATORY (INHALATION) DAILY
Refills: 11 | Status: DISCONTINUED | OUTPATIENT
Start: 2021-08-03 | End: 2021-08-03

## 2021-08-03 RX ORDER — SERTRALINE HYDROCHLORIDE 50 MG/1
100 TABLET, FILM COATED ORAL NIGHTLY
Status: DISCONTINUED | OUTPATIENT
Start: 2021-08-03 | End: 2021-08-03 | Stop reason: HOSPADM

## 2021-08-03 RX ORDER — METOPROLOL SUCCINATE 50 MG/1
50 TABLET, EXTENDED RELEASE ORAL DAILY
Qty: 30 TABLET | Refills: 0 | Status: SHIPPED | OUTPATIENT
Start: 2021-08-03 | End: 2021-08-10

## 2021-08-03 RX ORDER — HYDRALAZINE HYDROCHLORIDE 25 MG/1
25 TABLET, FILM COATED ORAL EVERY 8 HOURS
Qty: 90 TABLET | Refills: 0 | Status: SHIPPED | OUTPATIENT
Start: 2021-08-03 | End: 2021-10-22

## 2021-08-03 RX ORDER — INSULIN ASPART 100 [IU]/ML
0-5 INJECTION, SOLUTION INTRAVENOUS; SUBCUTANEOUS
Status: DISCONTINUED | OUTPATIENT
Start: 2021-08-03 | End: 2021-08-03 | Stop reason: HOSPADM

## 2021-08-03 RX ORDER — AMLODIPINE BESYLATE 2.5 MG/1
2.5 TABLET ORAL DAILY
Status: DISCONTINUED | OUTPATIENT
Start: 2021-08-03 | End: 2021-08-03

## 2021-08-03 RX ORDER — METOPROLOL SUCCINATE 50 MG/1
50 TABLET, EXTENDED RELEASE ORAL DAILY
Status: DISCONTINUED | OUTPATIENT
Start: 2021-08-03 | End: 2021-08-03 | Stop reason: HOSPADM

## 2021-08-03 RX ORDER — AMLODIPINE BESYLATE 2.5 MG/1
2.5 TABLET ORAL 2 TIMES DAILY
Status: DISCONTINUED | OUTPATIENT
Start: 2021-08-03 | End: 2021-08-03 | Stop reason: HOSPADM

## 2021-08-03 RX ORDER — ATORVASTATIN CALCIUM 40 MG/1
40 TABLET, FILM COATED ORAL NIGHTLY
Status: DISCONTINUED | OUTPATIENT
Start: 2021-08-03 | End: 2021-08-03 | Stop reason: HOSPADM

## 2021-08-03 RX ORDER — AMLODIPINE BESYLATE 2.5 MG/1
2.5 TABLET ORAL DAILY
Qty: 30 TABLET | Refills: 0 | Status: SHIPPED | OUTPATIENT
Start: 2021-08-03 | End: 2021-11-30 | Stop reason: SDUPTHER

## 2021-08-03 RX ORDER — HYDRALAZINE HYDROCHLORIDE 25 MG/1
25 TABLET, FILM COATED ORAL EVERY 8 HOURS
Status: DISCONTINUED | OUTPATIENT
Start: 2021-08-03 | End: 2021-08-03 | Stop reason: HOSPADM

## 2021-08-03 RX ORDER — ISOSORBIDE MONONITRATE 60 MG/1
60 TABLET, EXTENDED RELEASE ORAL DAILY
Qty: 30 TABLET | Refills: 0 | Status: SHIPPED | OUTPATIENT
Start: 2021-08-03 | End: 2021-08-10 | Stop reason: SDUPTHER

## 2021-08-03 RX ORDER — IBUPROFEN 200 MG
24 TABLET ORAL
Status: DISCONTINUED | OUTPATIENT
Start: 2021-08-03 | End: 2021-08-03

## 2021-08-03 RX ORDER — TALC
6 POWDER (GRAM) TOPICAL NIGHTLY PRN
Status: DISCONTINUED | OUTPATIENT
Start: 2021-08-03 | End: 2021-08-03 | Stop reason: HOSPADM

## 2021-08-03 RX ORDER — ONDANSETRON 2 MG/ML
4 INJECTION INTRAMUSCULAR; INTRAVENOUS
Status: COMPLETED | OUTPATIENT
Start: 2021-08-03 | End: 2021-08-03

## 2021-08-03 RX ORDER — RANOLAZINE 500 MG/1
500 TABLET, EXTENDED RELEASE ORAL 2 TIMES DAILY
Status: DISCONTINUED | OUTPATIENT
Start: 2021-08-03 | End: 2021-08-03 | Stop reason: HOSPADM

## 2021-08-03 RX ORDER — IBUPROFEN 200 MG
24 TABLET ORAL
Status: DISCONTINUED | OUTPATIENT
Start: 2021-08-03 | End: 2021-08-03 | Stop reason: HOSPADM

## 2021-08-03 RX ORDER — NITROGLYCERIN 0.4 MG/1
0.4 TABLET SUBLINGUAL EVERY 5 MIN PRN
Status: DISCONTINUED | OUTPATIENT
Start: 2021-08-03 | End: 2021-08-03 | Stop reason: HOSPADM

## 2021-08-03 RX ORDER — ONDANSETRON 2 MG/ML
4 INJECTION INTRAMUSCULAR; INTRAVENOUS EVERY 8 HOURS PRN
Status: DISCONTINUED | OUTPATIENT
Start: 2021-08-03 | End: 2021-08-03 | Stop reason: HOSPADM

## 2021-08-03 RX ORDER — GLUCAGON 1 MG
1 KIT INJECTION
Status: DISCONTINUED | OUTPATIENT
Start: 2021-08-03 | End: 2021-08-03

## 2021-08-03 RX ORDER — AMOXICILLIN 250 MG
1 CAPSULE ORAL 2 TIMES DAILY PRN
Status: DISCONTINUED | OUTPATIENT
Start: 2021-08-03 | End: 2021-08-03 | Stop reason: HOSPADM

## 2021-08-03 RX ORDER — ARFORMOTEROL TARTRATE 15 UG/2ML
15 SOLUTION RESPIRATORY (INHALATION) 2 TIMES DAILY
Status: DISCONTINUED | OUTPATIENT
Start: 2021-08-03 | End: 2021-08-03 | Stop reason: HOSPADM

## 2021-08-03 RX ORDER — LISINOPRIL 10 MG/1
10 TABLET ORAL DAILY
Qty: 30 TABLET | Refills: 0 | Status: SHIPPED | OUTPATIENT
Start: 2021-08-03 | End: 2021-08-10 | Stop reason: SDUPTHER

## 2021-08-03 RX ORDER — NITROGLYCERIN 0.4 MG/1
0.4 TABLET SUBLINGUAL EVERY 5 MIN PRN
Status: DISCONTINUED | OUTPATIENT
Start: 2021-08-03 | End: 2021-08-03

## 2021-08-03 RX ORDER — LISINOPRIL 10 MG/1
10 TABLET ORAL DAILY
Status: DISCONTINUED | OUTPATIENT
Start: 2021-08-03 | End: 2021-08-03 | Stop reason: HOSPADM

## 2021-08-03 RX ORDER — IBUPROFEN 200 MG
16 TABLET ORAL
Status: DISCONTINUED | OUTPATIENT
Start: 2021-08-03 | End: 2021-08-03

## 2021-08-03 RX ORDER — BUDESONIDE 0.5 MG/2ML
0.5 INHALANT ORAL EVERY 12 HOURS
Status: DISCONTINUED | OUTPATIENT
Start: 2021-08-03 | End: 2021-08-03 | Stop reason: HOSPADM

## 2021-08-03 RX ORDER — GABAPENTIN 400 MG/1
400 CAPSULE ORAL 3 TIMES DAILY
Status: DISCONTINUED | OUTPATIENT
Start: 2021-08-03 | End: 2021-08-03 | Stop reason: HOSPADM

## 2021-08-03 RX ORDER — PANTOPRAZOLE SODIUM 40 MG/1
40 TABLET, DELAYED RELEASE ORAL DAILY
Qty: 30 TABLET | Refills: 0 | Status: SHIPPED | OUTPATIENT
Start: 2021-08-03 | End: 2022-06-09 | Stop reason: SDUPTHER

## 2021-08-03 RX ORDER — APIXABAN 5 MG/1
5 TABLET, FILM COATED ORAL 2 TIMES DAILY
Qty: 60 TABLET | Refills: 0 | Status: SHIPPED | OUTPATIENT
Start: 2021-08-03 | End: 2021-11-11

## 2021-08-03 RX ORDER — GLUCAGON 1 MG
1 KIT INJECTION
Status: DISCONTINUED | OUTPATIENT
Start: 2021-08-03 | End: 2021-08-03 | Stop reason: HOSPADM

## 2021-08-03 RX ORDER — FUROSEMIDE 40 MG/1
40 TABLET ORAL 2 TIMES DAILY
Status: DISCONTINUED | OUTPATIENT
Start: 2021-08-03 | End: 2021-08-03 | Stop reason: HOSPADM

## 2021-08-03 RX ORDER — ACETAMINOPHEN 325 MG/1
650 TABLET ORAL EVERY 8 HOURS PRN
Status: DISCONTINUED | OUTPATIENT
Start: 2021-08-03 | End: 2021-08-03 | Stop reason: HOSPADM

## 2021-08-03 RX ORDER — IBUPROFEN 200 MG
16 TABLET ORAL
Status: DISCONTINUED | OUTPATIENT
Start: 2021-08-03 | End: 2021-08-03 | Stop reason: HOSPADM

## 2021-08-03 RX ORDER — HYDROCODONE BITARTRATE AND ACETAMINOPHEN 5; 325 MG/1; MG/1
1 TABLET ORAL ONCE
Status: COMPLETED | OUTPATIENT
Start: 2021-08-03 | End: 2021-08-03

## 2021-08-03 RX ORDER — ISOSORBIDE DINITRATE 20 MG/1
20 TABLET ORAL 3 TIMES DAILY
Status: DISCONTINUED | OUTPATIENT
Start: 2021-08-03 | End: 2021-08-03 | Stop reason: HOSPADM

## 2021-08-03 RX ORDER — FUROSEMIDE 40 MG/1
TABLET ORAL
Qty: 90 TABLET | Refills: 0 | Status: SHIPPED | OUTPATIENT
Start: 2021-08-03 | End: 2021-08-23

## 2021-08-03 RX ORDER — AZATHIOPRINE 50 MG/1
50 TABLET ORAL DAILY
Status: DISCONTINUED | OUTPATIENT
Start: 2021-08-03 | End: 2021-08-03 | Stop reason: HOSPADM

## 2021-08-03 RX ORDER — RANOLAZINE 500 MG/1
500 TABLET, EXTENDED RELEASE ORAL ONCE
Status: COMPLETED | OUTPATIENT
Start: 2021-08-03 | End: 2021-08-03

## 2021-08-03 RX ORDER — SODIUM CHLORIDE 0.9 % (FLUSH) 0.9 %
10 SYRINGE (ML) INJECTION EVERY 8 HOURS
Status: DISCONTINUED | OUTPATIENT
Start: 2021-08-03 | End: 2021-08-03 | Stop reason: HOSPADM

## 2021-08-03 RX ORDER — PANTOPRAZOLE SODIUM 40 MG/1
40 TABLET, DELAYED RELEASE ORAL DAILY
Status: DISCONTINUED | OUTPATIENT
Start: 2021-08-03 | End: 2021-08-03 | Stop reason: HOSPADM

## 2021-08-03 RX ORDER — MORPHINE SULFATE 4 MG/ML
4 INJECTION, SOLUTION INTRAMUSCULAR; INTRAVENOUS
Status: COMPLETED | OUTPATIENT
Start: 2021-08-03 | End: 2021-08-03

## 2021-08-03 RX ORDER — ALLOPURINOL 100 MG/1
100 TABLET ORAL DAILY
Status: DISCONTINUED | OUTPATIENT
Start: 2021-08-03 | End: 2021-08-03 | Stop reason: HOSPADM

## 2021-08-03 RX ADMIN — HYDROCODONE BITARTRATE AND ACETAMINOPHEN 1 TABLET: 5; 325 TABLET ORAL at 12:08

## 2021-08-03 RX ADMIN — GABAPENTIN 400 MG: 400 CAPSULE ORAL at 08:08

## 2021-08-03 RX ADMIN — RANOLAZINE 500 MG: 500 TABLET, EXTENDED RELEASE ORAL at 04:08

## 2021-08-03 RX ADMIN — AZATHIOPRINE 50 MG: 50 TABLET ORAL at 11:08

## 2021-08-03 RX ADMIN — ISOSORBIDE DINITRATE 20 MG: 20 TABLET ORAL at 09:08

## 2021-08-03 RX ADMIN — METOPROLOL SUCCINATE 50 MG: 50 TABLET, EXTENDED RELEASE ORAL at 08:08

## 2021-08-03 RX ADMIN — RANOLAZINE 500 MG: 500 TABLET, EXTENDED RELEASE ORAL at 08:08

## 2021-08-03 RX ADMIN — LISINOPRIL 10 MG: 10 TABLET ORAL at 08:08

## 2021-08-03 RX ADMIN — PANTOPRAZOLE SODIUM 40 MG: 40 TABLET, DELAYED RELEASE ORAL at 11:08

## 2021-08-03 RX ADMIN — MORPHINE SULFATE 4 MG: 4 INJECTION INTRAVENOUS at 03:08

## 2021-08-03 RX ADMIN — FUROSEMIDE 40 MG: 40 TABLET ORAL at 11:08

## 2021-08-03 RX ADMIN — ONDANSETRON 4 MG: 2 INJECTION INTRAMUSCULAR; INTRAVENOUS at 03:08

## 2021-08-03 RX ADMIN — NITROGLYCERIN 1 INCH: 20 OINTMENT TOPICAL at 02:08

## 2021-08-03 RX ADMIN — APIXABAN 5 MG: 2.5 TABLET, FILM COATED ORAL at 09:08

## 2021-08-03 RX ADMIN — ALLOPURINOL 100 MG: 100 TABLET ORAL at 11:08

## 2021-08-03 RX ADMIN — AMLODIPINE BESYLATE 2.5 MG: 2.5 TABLET ORAL at 08:08

## 2021-08-04 ENCOUNTER — OUTPATIENT CASE MANAGEMENT (OUTPATIENT)
Dept: ADMINISTRATIVE | Facility: OTHER | Age: 66
End: 2021-08-04

## 2021-08-06 PROBLEM — F14.129 COCAINE ABUSE WITH INTOXICATION: Status: ACTIVE | Noted: 2020-08-05

## 2021-08-09 ENCOUNTER — EXTERNAL HOME HEALTH (OUTPATIENT)
Dept: HOME HEALTH SERVICES | Facility: HOSPITAL | Age: 66
End: 2021-08-09

## 2021-08-09 ENCOUNTER — TELEPHONE (OUTPATIENT)
Dept: INTERNAL MEDICINE | Facility: CLINIC | Age: 66
End: 2021-08-09

## 2021-08-10 ENCOUNTER — OUTPATIENT CASE MANAGEMENT (OUTPATIENT)
Dept: ADMINISTRATIVE | Facility: OTHER | Age: 66
End: 2021-08-10

## 2021-08-10 ENCOUNTER — TELEPHONE (OUTPATIENT)
Dept: PRIMARY CARE CLINIC | Facility: CLINIC | Age: 66
End: 2021-08-10
Payer: MEDICARE

## 2021-08-10 ENCOUNTER — OFFICE VISIT (OUTPATIENT)
Dept: CARDIOLOGY | Facility: CLINIC | Age: 66
End: 2021-08-10
Payer: MEDICARE

## 2021-08-10 ENCOUNTER — TELEPHONE (OUTPATIENT)
Dept: INTERNAL MEDICINE | Facility: CLINIC | Age: 66
End: 2021-08-10

## 2021-08-10 VITALS
OXYGEN SATURATION: 100 % | BODY MASS INDEX: 31.22 KG/M2 | HEART RATE: 50 BPM | HEIGHT: 65 IN | DIASTOLIC BLOOD PRESSURE: 80 MMHG | WEIGHT: 187.38 LBS | SYSTOLIC BLOOD PRESSURE: 140 MMHG

## 2021-08-10 DIAGNOSIS — I10 UNCONTROLLED HYPERTENSION: ICD-10-CM

## 2021-08-10 DIAGNOSIS — R07.9 CHEST PAIN: Primary | ICD-10-CM

## 2021-08-10 DIAGNOSIS — G47.33 OSA ON CPAP: ICD-10-CM

## 2021-08-10 DIAGNOSIS — F14.10 COCAINE ABUSE: ICD-10-CM

## 2021-08-10 DIAGNOSIS — I48.0 PAF (PAROXYSMAL ATRIAL FIBRILLATION): Chronic | ICD-10-CM

## 2021-08-10 DIAGNOSIS — E78.5 HYPERLIPIDEMIA ASSOCIATED WITH TYPE 2 DIABETES MELLITUS: ICD-10-CM

## 2021-08-10 DIAGNOSIS — I50.32 CHRONIC DIASTOLIC HEART FAILURE: ICD-10-CM

## 2021-08-10 DIAGNOSIS — E11.69 HYPERLIPIDEMIA ASSOCIATED WITH TYPE 2 DIABETES MELLITUS: ICD-10-CM

## 2021-08-10 DIAGNOSIS — I25.110 ATHEROSCLEROSIS OF NATIVE CORONARY ARTERY OF NATIVE HEART WITH UNSTABLE ANGINA PECTORIS: ICD-10-CM

## 2021-08-10 DIAGNOSIS — I20.1 CORONARY VASOSPASM: ICD-10-CM

## 2021-08-10 DIAGNOSIS — I50.42 CHRONIC COMBINED SYSTOLIC AND DIASTOLIC CONGESTIVE HEART FAILURE: Chronic | ICD-10-CM

## 2021-08-10 DIAGNOSIS — I25.118 CORONARY ARTERY DISEASE OF NATIVE ARTERY OF NATIVE HEART WITH STABLE ANGINA PECTORIS: Chronic | ICD-10-CM

## 2021-08-10 PROCEDURE — 1160F PR REVIEW ALL MEDS BY PRESCRIBER/CLIN PHARMACIST DOCUMENTED: ICD-10-PCS | Mod: CPTII,S$GLB,, | Performed by: INTERNAL MEDICINE

## 2021-08-10 PROCEDURE — 1159F MED LIST DOCD IN RCRD: CPT | Mod: CPTII,S$GLB,, | Performed by: INTERNAL MEDICINE

## 2021-08-10 PROCEDURE — 99214 OFFICE O/P EST MOD 30 MIN: CPT | Mod: S$GLB,,, | Performed by: INTERNAL MEDICINE

## 2021-08-10 PROCEDURE — 1126F AMNT PAIN NOTED NONE PRSNT: CPT | Mod: CPTII,S$GLB,, | Performed by: INTERNAL MEDICINE

## 2021-08-10 PROCEDURE — 3008F PR BODY MASS INDEX (BMI) DOCUMENTED: ICD-10-PCS | Mod: CPTII,S$GLB,, | Performed by: INTERNAL MEDICINE

## 2021-08-10 PROCEDURE — 1160F RVW MEDS BY RX/DR IN RCRD: CPT | Mod: CPTII,S$GLB,, | Performed by: INTERNAL MEDICINE

## 2021-08-10 PROCEDURE — 99999 PR PBB SHADOW E&M-EST. PATIENT-LVL V: CPT | Mod: PBBFAC,,, | Performed by: INTERNAL MEDICINE

## 2021-08-10 PROCEDURE — 99999 PR PBB SHADOW E&M-EST. PATIENT-LVL V: ICD-10-PCS | Mod: PBBFAC,,, | Performed by: INTERNAL MEDICINE

## 2021-08-10 PROCEDURE — 3077F PR MOST RECENT SYSTOLIC BLOOD PRESSURE >= 140 MM HG: ICD-10-PCS | Mod: CPTII,S$GLB,, | Performed by: INTERNAL MEDICINE

## 2021-08-10 PROCEDURE — 99214 PR OFFICE/OUTPT VISIT, EST, LEVL IV, 30-39 MIN: ICD-10-PCS | Mod: S$GLB,,, | Performed by: INTERNAL MEDICINE

## 2021-08-10 PROCEDURE — 3079F PR MOST RECENT DIASTOLIC BLOOD PRESSURE 80-89 MM HG: ICD-10-PCS | Mod: CPTII,S$GLB,, | Performed by: INTERNAL MEDICINE

## 2021-08-10 PROCEDURE — 3077F SYST BP >= 140 MM HG: CPT | Mod: CPTII,S$GLB,, | Performed by: INTERNAL MEDICINE

## 2021-08-10 PROCEDURE — 3044F PR MOST RECENT HEMOGLOBIN A1C LEVEL <7.0%: ICD-10-PCS | Mod: CPTII,S$GLB,, | Performed by: INTERNAL MEDICINE

## 2021-08-10 PROCEDURE — 3079F DIAST BP 80-89 MM HG: CPT | Mod: CPTII,S$GLB,, | Performed by: INTERNAL MEDICINE

## 2021-08-10 PROCEDURE — 1159F PR MEDICATION LIST DOCUMENTED IN MEDICAL RECORD: ICD-10-PCS | Mod: CPTII,S$GLB,, | Performed by: INTERNAL MEDICINE

## 2021-08-10 PROCEDURE — 99499 UNLISTED E&M SERVICE: CPT | Mod: S$GLB,,, | Performed by: INTERNAL MEDICINE

## 2021-08-10 PROCEDURE — 3008F BODY MASS INDEX DOCD: CPT | Mod: CPTII,S$GLB,, | Performed by: INTERNAL MEDICINE

## 2021-08-10 PROCEDURE — 3044F HG A1C LEVEL LT 7.0%: CPT | Mod: CPTII,S$GLB,, | Performed by: INTERNAL MEDICINE

## 2021-08-10 PROCEDURE — 1126F PR PAIN SEVERITY QUANTIFIED, NO PAIN PRESENT: ICD-10-PCS | Mod: CPTII,S$GLB,, | Performed by: INTERNAL MEDICINE

## 2021-08-10 PROCEDURE — 99499 RISK ADDL DX/OHS AUDIT: ICD-10-PCS | Mod: S$GLB,,, | Performed by: INTERNAL MEDICINE

## 2021-08-10 RX ORDER — MIDAZOLAM HYDROCHLORIDE 1 MG/ML
INJECTION, SOLUTION INTRAMUSCULAR; INTRAVENOUS
Status: ON HOLD | COMMUNITY
Start: 2021-03-24 | End: 2022-03-05 | Stop reason: HOSPADM

## 2021-08-10 RX ORDER — ISOSORBIDE MONONITRATE 60 MG/1
60 TABLET, EXTENDED RELEASE ORAL 2 TIMES DAILY
Qty: 60 TABLET | Refills: 3 | Status: SHIPPED | OUTPATIENT
Start: 2021-08-10 | End: 2021-11-11

## 2021-08-10 RX ORDER — HEPARIN SODIUM 10000 [USP'U]/100ML
INJECTION, SOLUTION INTRAVENOUS
Status: ON HOLD | COMMUNITY
Start: 2021-05-25 | End: 2022-03-05 | Stop reason: HOSPADM

## 2021-08-10 RX ORDER — METHYLPREDNISOLONE ACETATE 40 MG/ML
INJECTION, SUSPENSION INTRALESIONAL; INTRAMUSCULAR; INTRASYNOVIAL; SOFT TISSUE
Status: ON HOLD | COMMUNITY
Start: 2021-03-24 | End: 2022-03-05 | Stop reason: HOSPADM

## 2021-08-10 RX ORDER — REGADENOSON 0.08 MG/ML
INJECTION, SOLUTION INTRAVENOUS
COMMUNITY
Start: 2021-05-25 | End: 2021-11-26

## 2021-08-10 RX ORDER — IOHEXOL 240 MG/ML
INJECTION, SOLUTION INTRATHECAL; INTRAVASCULAR; INTRAVENOUS; ORAL
Status: ON HOLD | COMMUNITY
Start: 2021-03-24 | End: 2022-03-05 | Stop reason: HOSPADM

## 2021-08-10 RX ORDER — METOPROLOL SUCCINATE 25 MG/1
12.5 TABLET, EXTENDED RELEASE ORAL DAILY
Qty: 45 TABLET | Refills: 5 | Status: SHIPPED | OUTPATIENT
Start: 2021-08-10 | End: 2021-11-15 | Stop reason: ALTCHOICE

## 2021-08-10 RX ORDER — FENTANYL CITRATE 50 UG/ML
INJECTION, SOLUTION INTRAMUSCULAR; INTRAVENOUS
Status: ON HOLD | COMMUNITY
Start: 2021-03-24 | End: 2022-03-05 | Stop reason: HOSPADM

## 2021-08-10 RX ORDER — LISINOPRIL 30 MG/1
30 TABLET ORAL DAILY
Qty: 90 TABLET | Refills: 2 | Status: SHIPPED | OUTPATIENT
Start: 2021-08-10 | End: 2021-10-22 | Stop reason: SDUPTHER

## 2021-08-11 ENCOUNTER — SPECIALTY PHARMACY (OUTPATIENT)
Dept: PHARMACY | Facility: CLINIC | Age: 66
End: 2021-08-11

## 2021-08-16 ENCOUNTER — TELEPHONE (OUTPATIENT)
Dept: PRIMARY CARE CLINIC | Facility: CLINIC | Age: 66
End: 2021-08-16

## 2021-08-17 ENCOUNTER — OUTPATIENT CASE MANAGEMENT (OUTPATIENT)
Dept: ADMINISTRATIVE | Facility: OTHER | Age: 66
End: 2021-08-17

## 2021-08-17 ENCOUNTER — CLINICAL SUPPORT (OUTPATIENT)
Dept: PRIMARY CARE CLINIC | Facility: CLINIC | Age: 66
End: 2021-08-17
Payer: MEDICARE

## 2021-08-17 DIAGNOSIS — F33.1 MDD (MAJOR DEPRESSIVE DISORDER), RECURRENT EPISODE, MODERATE: Primary | ICD-10-CM

## 2021-08-17 DIAGNOSIS — G89.29 OTHER CHRONIC PAIN: ICD-10-CM

## 2021-08-17 DIAGNOSIS — F41.1 GAD (GENERALIZED ANXIETY DISORDER): ICD-10-CM

## 2021-08-17 PROCEDURE — 90834 PR PSYCHOTHERAPY W/PATIENT, 45 MIN: ICD-10-PCS | Mod: BHI,95,, | Performed by: SOCIAL WORKER

## 2021-08-17 PROCEDURE — 90834 PSYTX W PT 45 MINUTES: CPT | Mod: BHI,95,, | Performed by: SOCIAL WORKER

## 2021-08-18 ENCOUNTER — TELEPHONE (OUTPATIENT)
Dept: PRIMARY CARE CLINIC | Facility: CLINIC | Age: 66
End: 2021-08-18
Payer: MEDICARE

## 2021-08-18 DIAGNOSIS — Z79.4 TYPE 2 DIABETES MELLITUS WITH MILD NONPROLIFERATIVE RETINOPATHY, WITH LONG-TERM CURRENT USE OF INSULIN, MACULAR EDEMA PRESENCE UNSPECIFIED, UNSPECIFIED LATERALITY: ICD-10-CM

## 2021-08-18 DIAGNOSIS — E11.3299 TYPE 2 DIABETES MELLITUS WITH MILD NONPROLIFERATIVE RETINOPATHY, WITH LONG-TERM CURRENT USE OF INSULIN, MACULAR EDEMA PRESENCE UNSPECIFIED, UNSPECIFIED LATERALITY: ICD-10-CM

## 2021-08-18 RX ORDER — INSULIN ASPART 100 [IU]/ML
INJECTION, SOLUTION INTRAVENOUS; SUBCUTANEOUS
Qty: 15 ML | Refills: 0 | Status: SHIPPED | OUTPATIENT
Start: 2021-08-18 | End: 2021-09-21

## 2021-08-19 ENCOUNTER — OFFICE VISIT (OUTPATIENT)
Dept: PODIATRY | Facility: CLINIC | Age: 66
End: 2021-08-19
Payer: MEDICARE

## 2021-08-19 VITALS
HEIGHT: 65 IN | SYSTOLIC BLOOD PRESSURE: 145 MMHG | BODY MASS INDEX: 31.16 KG/M2 | WEIGHT: 187 LBS | HEART RATE: 98 BPM | DIASTOLIC BLOOD PRESSURE: 81 MMHG

## 2021-08-19 DIAGNOSIS — S98.112A AMPUTATED GREAT TOE, LEFT: ICD-10-CM

## 2021-08-19 DIAGNOSIS — E11.49 TYPE II DIABETES MELLITUS WITH NEUROLOGICAL MANIFESTATIONS: Primary | ICD-10-CM

## 2021-08-19 DIAGNOSIS — L84 PRE-ULCERATIVE CALLUSES: ICD-10-CM

## 2021-08-19 DIAGNOSIS — B35.1 DERMATOPHYTOSIS OF NAIL: ICD-10-CM

## 2021-08-19 PROCEDURE — 1159F PR MEDICATION LIST DOCUMENTED IN MEDICAL RECORD: ICD-10-PCS | Mod: CPTII,S$GLB,, | Performed by: PODIATRIST

## 2021-08-19 PROCEDURE — 99499 UNLISTED E&M SERVICE: CPT | Mod: S$GLB,,, | Performed by: PODIATRIST

## 2021-08-19 PROCEDURE — 1126F PR PAIN SEVERITY QUANTIFIED, NO PAIN PRESENT: ICD-10-PCS | Mod: CPTII,S$GLB,, | Performed by: PODIATRIST

## 2021-08-19 PROCEDURE — 3077F SYST BP >= 140 MM HG: CPT | Mod: CPTII,S$GLB,, | Performed by: PODIATRIST

## 2021-08-19 PROCEDURE — 11055 PARING/CUTG B9 HYPRKER LES 1: CPT | Mod: Q7,S$GLB,, | Performed by: PODIATRIST

## 2021-08-19 PROCEDURE — 99499 NO LOS: ICD-10-PCS | Mod: S$GLB,,, | Performed by: PODIATRIST

## 2021-08-19 PROCEDURE — 1126F AMNT PAIN NOTED NONE PRSNT: CPT | Mod: CPTII,S$GLB,, | Performed by: PODIATRIST

## 2021-08-19 PROCEDURE — 3077F PR MOST RECENT SYSTOLIC BLOOD PRESSURE >= 140 MM HG: ICD-10-PCS | Mod: CPTII,S$GLB,, | Performed by: PODIATRIST

## 2021-08-19 PROCEDURE — 3044F PR MOST RECENT HEMOGLOBIN A1C LEVEL <7.0%: ICD-10-PCS | Mod: CPTII,S$GLB,, | Performed by: PODIATRIST

## 2021-08-19 PROCEDURE — 3044F HG A1C LEVEL LT 7.0%: CPT | Mod: CPTII,S$GLB,, | Performed by: PODIATRIST

## 2021-08-19 PROCEDURE — 3079F PR MOST RECENT DIASTOLIC BLOOD PRESSURE 80-89 MM HG: ICD-10-PCS | Mod: CPTII,S$GLB,, | Performed by: PODIATRIST

## 2021-08-19 PROCEDURE — 11721 DEBRIDE NAIL 6 OR MORE: CPT | Mod: 59,Q7,S$GLB, | Performed by: PODIATRIST

## 2021-08-19 PROCEDURE — 99999 PR PBB SHADOW E&M-EST. PATIENT-LVL V: CPT | Mod: PBBFAC,,, | Performed by: PODIATRIST

## 2021-08-19 PROCEDURE — 1159F MED LIST DOCD IN RCRD: CPT | Mod: CPTII,S$GLB,, | Performed by: PODIATRIST

## 2021-08-19 PROCEDURE — 11721 PR DEBRIDEMENT OF NAILS, 6 OR MORE: ICD-10-PCS | Mod: 59,Q7,S$GLB, | Performed by: PODIATRIST

## 2021-08-19 PROCEDURE — 1160F RVW MEDS BY RX/DR IN RCRD: CPT | Mod: CPTII,S$GLB,, | Performed by: PODIATRIST

## 2021-08-19 PROCEDURE — 1160F PR REVIEW ALL MEDS BY PRESCRIBER/CLIN PHARMACIST DOCUMENTED: ICD-10-PCS | Mod: CPTII,S$GLB,, | Performed by: PODIATRIST

## 2021-08-19 PROCEDURE — 11055 PR TRIM HYPERKERATOTIC SKIN LESION, ONE: ICD-10-PCS | Mod: Q7,S$GLB,, | Performed by: PODIATRIST

## 2021-08-19 PROCEDURE — 3008F PR BODY MASS INDEX (BMI) DOCUMENTED: ICD-10-PCS | Mod: CPTII,S$GLB,, | Performed by: PODIATRIST

## 2021-08-19 PROCEDURE — 3008F BODY MASS INDEX DOCD: CPT | Mod: CPTII,S$GLB,, | Performed by: PODIATRIST

## 2021-08-19 PROCEDURE — 3079F DIAST BP 80-89 MM HG: CPT | Mod: CPTII,S$GLB,, | Performed by: PODIATRIST

## 2021-08-19 PROCEDURE — 99999 PR PBB SHADOW E&M-EST. PATIENT-LVL V: ICD-10-PCS | Mod: PBBFAC,,, | Performed by: PODIATRIST

## 2021-08-21 DIAGNOSIS — Z12.11 SPECIAL SCREENING FOR MALIGNANT NEOPLASM OF COLON: Primary | ICD-10-CM

## 2021-08-25 ENCOUNTER — DOCUMENT SCAN (OUTPATIENT)
Dept: HOME HEALTH SERVICES | Facility: HOSPITAL | Age: 66
End: 2021-08-25
Payer: MEDICARE

## 2021-08-26 ENCOUNTER — TELEPHONE (OUTPATIENT)
Dept: OPHTHALMOLOGY | Facility: CLINIC | Age: 66
End: 2021-08-26

## 2021-08-27 ENCOUNTER — TELEPHONE (OUTPATIENT)
Dept: INTERNAL MEDICINE | Facility: CLINIC | Age: 66
End: 2021-08-27

## 2021-08-30 ENCOUNTER — TELEPHONE (OUTPATIENT)
Dept: PRIMARY CARE CLINIC | Facility: CLINIC | Age: 66
End: 2021-08-30

## 2021-08-31 ENCOUNTER — TELEPHONE (OUTPATIENT)
Dept: INTERNAL MEDICINE | Facility: CLINIC | Age: 66
End: 2021-08-31

## 2021-09-01 ENCOUNTER — OUTPATIENT CASE MANAGEMENT (OUTPATIENT)
Dept: ADMINISTRATIVE | Facility: OTHER | Age: 66
End: 2021-09-01

## 2021-09-07 ENCOUNTER — TELEPHONE (OUTPATIENT)
Dept: PRIMARY CARE CLINIC | Facility: CLINIC | Age: 66
End: 2021-09-07

## 2021-09-08 ENCOUNTER — PATIENT OUTREACH (OUTPATIENT)
Dept: ADMINISTRATIVE | Facility: HOSPITAL | Age: 66
End: 2021-09-08

## 2021-09-08 ENCOUNTER — TELEPHONE (OUTPATIENT)
Dept: INTERNAL MEDICINE | Facility: CLINIC | Age: 66
End: 2021-09-08

## 2021-09-09 ENCOUNTER — TELEPHONE (OUTPATIENT)
Dept: INTERNAL MEDICINE | Facility: CLINIC | Age: 66
End: 2021-09-09

## 2021-09-09 ENCOUNTER — TELEPHONE (OUTPATIENT)
Dept: ADMINISTRATIVE | Facility: HOSPITAL | Age: 66
End: 2021-09-09

## 2021-09-10 ENCOUNTER — TELEPHONE (OUTPATIENT)
Dept: INTERNAL MEDICINE | Facility: CLINIC | Age: 66
End: 2021-09-10

## 2021-09-10 DIAGNOSIS — M33.13 DERMATOMYOSITIS: ICD-10-CM

## 2021-09-10 NOTE — PT/OT/SLP PROGRESS
"Physical Therapy      Patient Name:  Crow Green   MRN:  [id number]    PADWOA GARAY INITIATED THIS AM VIA CHART REVIEW AND PT INTERVIEW, ONCE INTERVIEW COMPLETE PT ADAMANTLY REFUSED TO GET UP DESPITE MAX ENCOURAGEMENT, "I AM NOT DOING IT TODAY, I AM NOT READY", WILL ASSESS PT NEXT VISIT    Linda Pickett, PT   7/2/2020  0845    " Patient Education        Viral Infections in Teens: Care Instructions  Your Care Instructions     You don't feel well, but it's not clear what's causing it. You may have a viral infection. Viruses cause many illnesses, such as the common cold, influenza, fever, and rashes. Viruses also cause the diarrhea, nausea, and vomiting that are often called \"stomach flu. \" You may wonder if antibiotic medicines could make you feel better. But antibiotics only treat infections caused by bacteria. They don't work on viruses. The good news is that viral infections usually aren't serious. Most will go away in a few days without medical treatment. In the meantime, there are a few things you can do to make yourself more comfortable. Follow-up care is a key part of your treatment and safety. Be sure to make and go to all appointments, and call your doctor if you are having problems. It's also a good idea to know your test results and keep a list of the medicines you take. How can you care for yourself at home? · Get plenty of rest if you feel tired. · Take an over-the-counter pain medicine if needed, such as acetaminophen (Tylenol), ibuprofen (Advil, Motrin), or naproxen (Aleve). Be safe with medicines. Read and follow all instructions on the label. No one younger than 20 should take aspirin. It has been linked to Reye syndrome, a serious illness. · Be careful when taking over-the-counter cold or flu medicines and Tylenol at the same time. Many of these medicines have acetaminophen, which is Tylenol. Read the labels to make sure that you are not taking more than the recommended dose. Too much acetaminophen (Tylenol) can be harmful. · Drink plenty of fluids. If you have kidney, heart, or liver disease and have to limit fluids, talk with your doctor before you increase the amount of fluids you drink. · Stay home from school, work, and other public places while you have a fever. When should you call for help?    Call your

## 2021-09-13 ENCOUNTER — CLINICAL SUPPORT (OUTPATIENT)
Dept: PRIMARY CARE CLINIC | Facility: CLINIC | Age: 66
End: 2021-09-13
Payer: MEDICARE

## 2021-09-13 ENCOUNTER — OFFICE VISIT (OUTPATIENT)
Dept: OPHTHALMOLOGY | Facility: CLINIC | Age: 66
End: 2021-09-13
Payer: MEDICARE

## 2021-09-13 ENCOUNTER — TELEPHONE (OUTPATIENT)
Dept: PRIMARY CARE CLINIC | Facility: CLINIC | Age: 66
End: 2021-09-13

## 2021-09-13 DIAGNOSIS — Z79.4 TYPE 2 DIABETES MELLITUS WITH BOTH EYES AFFECTED BY MILD NONPROLIFERATIVE RETINOPATHY AND MACULAR EDEMA, WITH LONG-TERM CURRENT USE OF INSULIN: Primary | ICD-10-CM

## 2021-09-13 DIAGNOSIS — H02.59 FLOPPY EYELID SYNDROME OF BOTH EYES: ICD-10-CM

## 2021-09-13 DIAGNOSIS — F33.1 MDD (MAJOR DEPRESSIVE DISORDER), RECURRENT EPISODE, MODERATE: Primary | ICD-10-CM

## 2021-09-13 DIAGNOSIS — G89.29 CHRONIC LEFT SHOULDER PAIN: ICD-10-CM

## 2021-09-13 DIAGNOSIS — H40.1132 PRIMARY OPEN ANGLE GLAUCOMA (POAG) OF BOTH EYES, MODERATE STAGE: ICD-10-CM

## 2021-09-13 DIAGNOSIS — E11.36 DIABETIC CATARACT: ICD-10-CM

## 2021-09-13 DIAGNOSIS — E11.3213 TYPE 2 DIABETES MELLITUS WITH BOTH EYES AFFECTED BY MILD NONPROLIFERATIVE RETINOPATHY AND MACULAR EDEMA, WITH LONG-TERM CURRENT USE OF INSULIN: Primary | ICD-10-CM

## 2021-09-13 DIAGNOSIS — F41.1 GAD (GENERALIZED ANXIETY DISORDER): ICD-10-CM

## 2021-09-13 DIAGNOSIS — M25.512 CHRONIC LEFT SHOULDER PAIN: ICD-10-CM

## 2021-09-13 PROCEDURE — 99999 PR PBB SHADOW E&M-EST. PATIENT-LVL I: CPT | Mod: PBBFAC,HCNC,, | Performed by: OPHTHALMOLOGY

## 2021-09-13 PROCEDURE — 99213 OFFICE O/P EST LOW 20 MIN: CPT | Mod: HCNC,S$GLB,, | Performed by: OPHTHALMOLOGY

## 2021-09-13 PROCEDURE — 99999 PR PBB SHADOW E&M-EST. PATIENT-LVL I: ICD-10-PCS | Mod: PBBFAC,HCNC,, | Performed by: OPHTHALMOLOGY

## 2021-09-13 PROCEDURE — 92134 POSTERIOR SEGMENT OCT RETINA (OCULAR COHERENCE TOMOGRAPHY)-BOTH EYES: ICD-10-PCS | Mod: HCNC,S$GLB,, | Performed by: OPHTHALMOLOGY

## 2021-09-13 PROCEDURE — 90834 PR PSYCHOTHERAPY W/PATIENT, 45 MIN: ICD-10-PCS | Mod: BHI,95,, | Performed by: SOCIAL WORKER

## 2021-09-13 PROCEDURE — 99213 PR OFFICE/OUTPT VISIT, EST, LEVL III, 20-29 MIN: ICD-10-PCS | Mod: HCNC,S$GLB,, | Performed by: OPHTHALMOLOGY

## 2021-09-13 PROCEDURE — 92134 CPTRZ OPH DX IMG PST SGM RTA: CPT | Mod: PBBFAC,HCNC | Performed by: OPHTHALMOLOGY

## 2021-09-13 PROCEDURE — 90834 PSYTX W PT 45 MINUTES: CPT | Mod: BHI,95,, | Performed by: SOCIAL WORKER

## 2021-09-13 PROCEDURE — 99211 OFF/OP EST MAY X REQ PHY/QHP: CPT | Mod: PBBFAC | Performed by: OPHTHALMOLOGY

## 2021-09-13 RX ORDER — AZATHIOPRINE 50 MG/1
50 TABLET ORAL DAILY
Qty: 90 TABLET | Refills: 0 | Status: SHIPPED | OUTPATIENT
Start: 2021-09-13 | End: 2022-01-03 | Stop reason: SDUPTHER

## 2021-09-14 ENCOUNTER — DOCUMENT SCAN (OUTPATIENT)
Dept: HOME HEALTH SERVICES | Facility: HOSPITAL | Age: 66
End: 2021-09-14
Payer: MEDICARE

## 2021-09-15 ENCOUNTER — OFFICE VISIT (OUTPATIENT)
Dept: INTERNAL MEDICINE | Facility: CLINIC | Age: 66
End: 2021-09-15
Payer: MEDICARE

## 2021-09-15 VITALS
DIASTOLIC BLOOD PRESSURE: 80 MMHG | RESPIRATION RATE: 18 BRPM | OXYGEN SATURATION: 97 % | HEART RATE: 91 BPM | TEMPERATURE: 98 F | BODY MASS INDEX: 31.59 KG/M2 | HEIGHT: 65 IN | WEIGHT: 189.63 LBS | SYSTOLIC BLOOD PRESSURE: 124 MMHG

## 2021-09-15 DIAGNOSIS — L97.523 DIABETIC ULCER OF TOE OF LEFT FOOT ASSOCIATED WITH TYPE 2 DIABETES MELLITUS, WITH NECROSIS OF MUSCLE: ICD-10-CM

## 2021-09-15 DIAGNOSIS — E11.621 DIABETIC ULCER OF TOE OF LEFT FOOT ASSOCIATED WITH TYPE 2 DIABETES MELLITUS, WITH NECROSIS OF MUSCLE: ICD-10-CM

## 2021-09-15 DIAGNOSIS — I25.110 ATHEROSCLEROSIS OF NATIVE CORONARY ARTERY OF NATIVE HEART WITH UNSTABLE ANGINA PECTORIS: ICD-10-CM

## 2021-09-15 DIAGNOSIS — K52.1 DIARRHEA DUE TO DRUG: Primary | ICD-10-CM

## 2021-09-15 DIAGNOSIS — I50.42 CHRONIC COMBINED SYSTOLIC AND DIASTOLIC CONGESTIVE HEART FAILURE: ICD-10-CM

## 2021-09-15 DIAGNOSIS — Z12.5 SCREENING FOR MALIGNANT NEOPLASM OF PROSTATE: ICD-10-CM

## 2021-09-15 PROCEDURE — 3074F PR MOST RECENT SYSTOLIC BLOOD PRESSURE < 130 MM HG: ICD-10-PCS | Mod: HCNC,CPTII,S$GLB, | Performed by: NURSE PRACTITIONER

## 2021-09-15 PROCEDURE — 3044F HG A1C LEVEL LT 7.0%: CPT | Mod: HCNC,CPTII,S$GLB, | Performed by: NURSE PRACTITIONER

## 2021-09-15 PROCEDURE — 3066F PR DOCUMENTATION OF TREATMENT FOR NEPHROPATHY: ICD-10-PCS | Mod: HCNC,CPTII,S$GLB, | Performed by: NURSE PRACTITIONER

## 2021-09-15 PROCEDURE — 99999 PR PBB SHADOW E&M-EST. PATIENT-LVL V: CPT | Mod: PBBFAC,HCNC,, | Performed by: NURSE PRACTITIONER

## 2021-09-15 PROCEDURE — 3079F PR MOST RECENT DIASTOLIC BLOOD PRESSURE 80-89 MM HG: ICD-10-PCS | Mod: HCNC,CPTII,S$GLB, | Performed by: NURSE PRACTITIONER

## 2021-09-15 PROCEDURE — 3062F PR POS MACROALBUMINURIA RESULT DOCUMENTED/REVIEW: ICD-10-PCS | Mod: HCNC,CPTII,S$GLB, | Performed by: NURSE PRACTITIONER

## 2021-09-15 PROCEDURE — 99499 RISK ADDL DX/OHS AUDIT: ICD-10-PCS | Mod: HCNC,S$GLB,, | Performed by: NURSE PRACTITIONER

## 2021-09-15 PROCEDURE — 99214 OFFICE O/P EST MOD 30 MIN: CPT | Mod: HCNC,S$GLB,, | Performed by: NURSE PRACTITIONER

## 2021-09-15 PROCEDURE — 4010F ACE/ARB THERAPY RXD/TAKEN: CPT | Mod: HCNC,CPTII,S$GLB, | Performed by: NURSE PRACTITIONER

## 2021-09-15 PROCEDURE — 1159F PR MEDICATION LIST DOCUMENTED IN MEDICAL RECORD: ICD-10-PCS | Mod: HCNC,CPTII,S$GLB, | Performed by: NURSE PRACTITIONER

## 2021-09-15 PROCEDURE — 99499 UNLISTED E&M SERVICE: CPT | Mod: HCNC,S$GLB,, | Performed by: NURSE PRACTITIONER

## 2021-09-15 PROCEDURE — 99999 PR PBB SHADOW E&M-EST. PATIENT-LVL V: ICD-10-PCS | Mod: PBBFAC,HCNC,, | Performed by: NURSE PRACTITIONER

## 2021-09-15 PROCEDURE — 3044F PR MOST RECENT HEMOGLOBIN A1C LEVEL <7.0%: ICD-10-PCS | Mod: HCNC,CPTII,S$GLB, | Performed by: NURSE PRACTITIONER

## 2021-09-15 PROCEDURE — 3066F NEPHROPATHY DOC TX: CPT | Mod: HCNC,CPTII,S$GLB, | Performed by: NURSE PRACTITIONER

## 2021-09-15 PROCEDURE — 3079F DIAST BP 80-89 MM HG: CPT | Mod: HCNC,CPTII,S$GLB, | Performed by: NURSE PRACTITIONER

## 2021-09-15 PROCEDURE — 3008F BODY MASS INDEX DOCD: CPT | Mod: HCNC,CPTII,S$GLB, | Performed by: NURSE PRACTITIONER

## 2021-09-15 PROCEDURE — 3008F PR BODY MASS INDEX (BMI) DOCUMENTED: ICD-10-PCS | Mod: HCNC,CPTII,S$GLB, | Performed by: NURSE PRACTITIONER

## 2021-09-15 PROCEDURE — 3074F SYST BP LT 130 MM HG: CPT | Mod: HCNC,CPTII,S$GLB, | Performed by: NURSE PRACTITIONER

## 2021-09-15 PROCEDURE — 4010F PR ACE/ARB THEARPY RXD/TAKEN: ICD-10-PCS | Mod: HCNC,CPTII,S$GLB, | Performed by: NURSE PRACTITIONER

## 2021-09-15 PROCEDURE — 99214 PR OFFICE/OUTPT VISIT, EST, LEVL IV, 30-39 MIN: ICD-10-PCS | Mod: HCNC,S$GLB,, | Performed by: NURSE PRACTITIONER

## 2021-09-15 PROCEDURE — 3062F POS MACROALBUMINURIA REV: CPT | Mod: HCNC,CPTII,S$GLB, | Performed by: NURSE PRACTITIONER

## 2021-09-15 PROCEDURE — 1159F MED LIST DOCD IN RCRD: CPT | Mod: HCNC,CPTII,S$GLB, | Performed by: NURSE PRACTITIONER

## 2021-09-16 ENCOUNTER — OFFICE VISIT (OUTPATIENT)
Dept: INTERNAL MEDICINE | Facility: CLINIC | Age: 66
End: 2021-09-16
Payer: MEDICARE

## 2021-09-16 ENCOUNTER — TELEPHONE (OUTPATIENT)
Dept: PRIMARY CARE CLINIC | Facility: CLINIC | Age: 66
End: 2021-09-16

## 2021-09-16 ENCOUNTER — OUTPATIENT CASE MANAGEMENT (OUTPATIENT)
Dept: ADMINISTRATIVE | Facility: OTHER | Age: 66
End: 2021-09-16

## 2021-09-16 ENCOUNTER — DOCUMENT SCAN (OUTPATIENT)
Dept: HOME HEALTH SERVICES | Facility: HOSPITAL | Age: 66
End: 2021-09-16
Payer: MEDICARE

## 2021-09-16 ENCOUNTER — TELEPHONE (OUTPATIENT)
Dept: PSYCHIATRY | Facility: CLINIC | Age: 66
End: 2021-09-16

## 2021-09-16 ENCOUNTER — LAB VISIT (OUTPATIENT)
Dept: LAB | Facility: HOSPITAL | Age: 66
End: 2021-09-16
Attending: NURSE PRACTITIONER
Payer: MEDICARE

## 2021-09-16 VITALS
OXYGEN SATURATION: 97 % | HEART RATE: 90 BPM | DIASTOLIC BLOOD PRESSURE: 80 MMHG | WEIGHT: 191.38 LBS | BODY MASS INDEX: 31.89 KG/M2 | SYSTOLIC BLOOD PRESSURE: 132 MMHG | HEIGHT: 65 IN

## 2021-09-16 DIAGNOSIS — E66.9 OBESITY (BMI 30.0-34.9): ICD-10-CM

## 2021-09-16 DIAGNOSIS — R26.9 ABNORMALITY OF GAIT AND MOBILITY: ICD-10-CM

## 2021-09-16 DIAGNOSIS — I50.42 CHRONIC COMBINED SYSTOLIC AND DIASTOLIC HEART FAILURE: ICD-10-CM

## 2021-09-16 DIAGNOSIS — E21.5 DISORDER OF PARATHYROID GLAND: ICD-10-CM

## 2021-09-16 DIAGNOSIS — Z79.4 TYPE 2 DIABETES MELLITUS WITH BOTH EYES AFFECTED BY MILD NONPROLIFERATIVE RETINOPATHY AND MACULAR EDEMA, WITH LONG-TERM CURRENT USE OF INSULIN: ICD-10-CM

## 2021-09-16 DIAGNOSIS — I70.0 ATHEROSCLEROSIS OF AORTA: ICD-10-CM

## 2021-09-16 DIAGNOSIS — I25.10 CORONARY ARTERY DISEASE, ANGINA PRESENCE UNSPECIFIED, UNSPECIFIED VESSEL OR LESION TYPE, UNSPECIFIED WHETHER NATIVE OR TRANSPLANTED HEART: ICD-10-CM

## 2021-09-16 DIAGNOSIS — M85.80 OSTEOPENIA, UNSPECIFIED LOCATION: ICD-10-CM

## 2021-09-16 DIAGNOSIS — Z59.41 FOOD INSECURITY: ICD-10-CM

## 2021-09-16 DIAGNOSIS — G47.33 OSA (OBSTRUCTIVE SLEEP APNEA): ICD-10-CM

## 2021-09-16 DIAGNOSIS — J45.909 ASTHMA, UNSPECIFIED ASTHMA SEVERITY, UNSPECIFIED WHETHER COMPLICATED, UNSPECIFIED WHETHER PERSISTENT: ICD-10-CM

## 2021-09-16 DIAGNOSIS — I10 HYPERTENSION, UNSPECIFIED TYPE: ICD-10-CM

## 2021-09-16 DIAGNOSIS — E78.5 HYPERLIPIDEMIA ASSOCIATED WITH TYPE 2 DIABETES MELLITUS: ICD-10-CM

## 2021-09-16 DIAGNOSIS — Z74.8 ASSISTANCE NEEDED WITH TRANSPORTATION: ICD-10-CM

## 2021-09-16 DIAGNOSIS — E11.3213 TYPE 2 DIABETES MELLITUS WITH BOTH EYES AFFECTED BY MILD NONPROLIFERATIVE RETINOPATHY AND MACULAR EDEMA, WITH LONG-TERM CURRENT USE OF INSULIN: ICD-10-CM

## 2021-09-16 DIAGNOSIS — I48.0 PAF (PAROXYSMAL ATRIAL FIBRILLATION): ICD-10-CM

## 2021-09-16 DIAGNOSIS — F25.1 SCHIZOAFFECTIVE DISORDER, DEPRESSIVE TYPE: ICD-10-CM

## 2021-09-16 DIAGNOSIS — S98.112A AMPUTATION OF LEFT GREAT TOE: ICD-10-CM

## 2021-09-16 DIAGNOSIS — Z12.5 SCREENING FOR MALIGNANT NEOPLASM OF PROSTATE: ICD-10-CM

## 2021-09-16 DIAGNOSIS — F32.2 CURRENT SEVERE EPISODE OF MAJOR DEPRESSIVE DISORDER WITHOUT PSYCHOTIC FEATURES, UNSPECIFIED WHETHER RECURRENT: ICD-10-CM

## 2021-09-16 DIAGNOSIS — I20.1 CORONARY VASOSPASM: ICD-10-CM

## 2021-09-16 DIAGNOSIS — R41.3 MEMORY DIFFICULTIES: ICD-10-CM

## 2021-09-16 DIAGNOSIS — E11.69 HYPERLIPIDEMIA ASSOCIATED WITH TYPE 2 DIABETES MELLITUS: ICD-10-CM

## 2021-09-16 DIAGNOSIS — J44.9 CHRONIC OBSTRUCTIVE PULMONARY DISEASE, UNSPECIFIED COPD TYPE: ICD-10-CM

## 2021-09-16 DIAGNOSIS — Z00.00 ENCOUNTER FOR PREVENTIVE HEALTH EXAMINATION: Primary | ICD-10-CM

## 2021-09-16 DIAGNOSIS — Z74.09 OTHER REDUCED MOBILITY: ICD-10-CM

## 2021-09-16 DIAGNOSIS — Z59.9 FINANCIAL DIFFICULTIES: ICD-10-CM

## 2021-09-16 DIAGNOSIS — E11.49 TYPE II DIABETES MELLITUS WITH NEUROLOGICAL MANIFESTATIONS: ICD-10-CM

## 2021-09-16 DIAGNOSIS — D64.9 ANEMIA, UNSPECIFIED TYPE: ICD-10-CM

## 2021-09-16 DIAGNOSIS — F41.1 GAD (GENERALIZED ANXIETY DISORDER): ICD-10-CM

## 2021-09-16 LAB — COMPLEXED PSA SERPL-MCNC: 2 NG/ML (ref 0–4)

## 2021-09-16 PROCEDURE — 3066F PR DOCUMENTATION OF TREATMENT FOR NEPHROPATHY: ICD-10-PCS | Mod: HCNC,CPTII,S$GLB, | Performed by: NURSE PRACTITIONER

## 2021-09-16 PROCEDURE — 99499 UNLISTED E&M SERVICE: CPT | Mod: HCNC,S$GLB,, | Performed by: NURSE PRACTITIONER

## 2021-09-16 PROCEDURE — 84153 ASSAY OF PSA TOTAL: CPT | Mod: HCNC | Performed by: NURSE PRACTITIONER

## 2021-09-16 PROCEDURE — 99999 PR PBB SHADOW E&M-EST. PATIENT-LVL IV: CPT | Mod: PBBFAC,HCNC,, | Performed by: NURSE PRACTITIONER

## 2021-09-16 PROCEDURE — 3075F PR MOST RECENT SYSTOLIC BLOOD PRESS GE 130-139MM HG: ICD-10-PCS | Mod: HCNC,CPTII,S$GLB, | Performed by: NURSE PRACTITIONER

## 2021-09-16 PROCEDURE — 4010F ACE/ARB THERAPY RXD/TAKEN: CPT | Mod: HCNC,CPTII,S$GLB, | Performed by: NURSE PRACTITIONER

## 2021-09-16 PROCEDURE — G0439 PPPS, SUBSEQ VISIT: HCPCS | Mod: HCNC,S$GLB,, | Performed by: NURSE PRACTITIONER

## 2021-09-16 PROCEDURE — 3008F BODY MASS INDEX DOCD: CPT | Mod: HCNC,CPTII,S$GLB, | Performed by: NURSE PRACTITIONER

## 2021-09-16 PROCEDURE — 3062F POS MACROALBUMINURIA REV: CPT | Mod: HCNC,CPTII,S$GLB, | Performed by: NURSE PRACTITIONER

## 2021-09-16 PROCEDURE — G9919 SCRN ND POS ND PROV OF REC: HCPCS | Mod: HCNC,CPTII,S$GLB, | Performed by: NURSE PRACTITIONER

## 2021-09-16 PROCEDURE — 99499 RISK ADDL DX/OHS AUDIT: ICD-10-PCS | Mod: HCNC,S$GLB,, | Performed by: NURSE PRACTITIONER

## 2021-09-16 PROCEDURE — 3079F DIAST BP 80-89 MM HG: CPT | Mod: HCNC,CPTII,S$GLB, | Performed by: NURSE PRACTITIONER

## 2021-09-16 PROCEDURE — 3044F PR MOST RECENT HEMOGLOBIN A1C LEVEL <7.0%: ICD-10-PCS | Mod: HCNC,CPTII,S$GLB, | Performed by: NURSE PRACTITIONER

## 2021-09-16 PROCEDURE — 3008F PR BODY MASS INDEX (BMI) DOCUMENTED: ICD-10-PCS | Mod: HCNC,CPTII,S$GLB, | Performed by: NURSE PRACTITIONER

## 2021-09-16 PROCEDURE — 3062F PR POS MACROALBUMINURIA RESULT DOCUMENTED/REVIEW: ICD-10-PCS | Mod: HCNC,CPTII,S$GLB, | Performed by: NURSE PRACTITIONER

## 2021-09-16 PROCEDURE — 3044F HG A1C LEVEL LT 7.0%: CPT | Mod: HCNC,CPTII,S$GLB, | Performed by: NURSE PRACTITIONER

## 2021-09-16 PROCEDURE — 3079F PR MOST RECENT DIASTOLIC BLOOD PRESSURE 80-89 MM HG: ICD-10-PCS | Mod: HCNC,CPTII,S$GLB, | Performed by: NURSE PRACTITIONER

## 2021-09-16 PROCEDURE — G0439 PR MEDICARE ANNUAL WELLNESS SUBSEQUENT VISIT: ICD-10-PCS | Mod: HCNC,S$GLB,, | Performed by: NURSE PRACTITIONER

## 2021-09-16 PROCEDURE — 3075F SYST BP GE 130 - 139MM HG: CPT | Mod: HCNC,CPTII,S$GLB, | Performed by: NURSE PRACTITIONER

## 2021-09-16 PROCEDURE — G9919 PR SCREENING AND POSITIVE: ICD-10-PCS | Mod: HCNC,CPTII,S$GLB, | Performed by: NURSE PRACTITIONER

## 2021-09-16 PROCEDURE — 36415 COLL VENOUS BLD VENIPUNCTURE: CPT | Mod: HCNC | Performed by: NURSE PRACTITIONER

## 2021-09-16 PROCEDURE — 3066F NEPHROPATHY DOC TX: CPT | Mod: HCNC,CPTII,S$GLB, | Performed by: NURSE PRACTITIONER

## 2021-09-16 PROCEDURE — 99999 PR PBB SHADOW E&M-EST. PATIENT-LVL IV: ICD-10-PCS | Mod: PBBFAC,HCNC,, | Performed by: NURSE PRACTITIONER

## 2021-09-16 PROCEDURE — 4010F PR ACE/ARB THEARPY RXD/TAKEN: ICD-10-PCS | Mod: HCNC,CPTII,S$GLB, | Performed by: NURSE PRACTITIONER

## 2021-09-16 SDOH — SOCIAL DETERMINANTS OF HEALTH (SDOH): FOOD INSECURITY: Z59.41

## 2021-09-16 SDOH — SOCIAL DETERMINANTS OF HEALTH (SDOH): PROBLEM RELATED TO HOUSING AND ECONOMIC CIRCUMSTANCES, UNSPECIFIED: Z59.9

## 2021-09-17 ENCOUNTER — DOCUMENT SCAN (OUTPATIENT)
Dept: HOME HEALTH SERVICES | Facility: HOSPITAL | Age: 66
End: 2021-09-17
Payer: MEDICARE

## 2021-09-17 ENCOUNTER — TELEPHONE (OUTPATIENT)
Dept: INTERNAL MEDICINE | Facility: CLINIC | Age: 66
End: 2021-09-17

## 2021-09-20 ENCOUNTER — TELEPHONE (OUTPATIENT)
Dept: INTERNAL MEDICINE | Facility: CLINIC | Age: 66
End: 2021-09-20

## 2021-09-20 DIAGNOSIS — Z79.4 TYPE 2 DIABETES MELLITUS WITH MILD NONPROLIFERATIVE RETINOPATHY, WITH LONG-TERM CURRENT USE OF INSULIN, MACULAR EDEMA PRESENCE UNSPECIFIED, UNSPECIFIED LATERALITY: ICD-10-CM

## 2021-09-20 DIAGNOSIS — E11.3299 TYPE 2 DIABETES MELLITUS WITH MILD NONPROLIFERATIVE RETINOPATHY, WITH LONG-TERM CURRENT USE OF INSULIN, MACULAR EDEMA PRESENCE UNSPECIFIED, UNSPECIFIED LATERALITY: ICD-10-CM

## 2021-09-21 RX ORDER — INSULIN ASPART 100 [IU]/ML
INJECTION, SOLUTION INTRAVENOUS; SUBCUTANEOUS
Qty: 15 ML | Refills: 0 | Status: SHIPPED | OUTPATIENT
Start: 2021-09-21 | End: 2021-11-01

## 2021-09-22 ENCOUNTER — OUTPATIENT CASE MANAGEMENT (OUTPATIENT)
Dept: ADMINISTRATIVE | Facility: OTHER | Age: 66
End: 2021-09-22

## 2021-09-22 ENCOUNTER — PATIENT MESSAGE (OUTPATIENT)
Dept: PHARMACY | Facility: CLINIC | Age: 66
End: 2021-09-22

## 2021-09-23 ENCOUNTER — TELEPHONE (OUTPATIENT)
Dept: INTERNAL MEDICINE | Facility: CLINIC | Age: 66
End: 2021-09-23

## 2021-09-23 ENCOUNTER — HOSPITAL ENCOUNTER (EMERGENCY)
Facility: HOSPITAL | Age: 66
Discharge: HOME | End: 2021-09-24
Attending: EMERGENCY MEDICINE
Payer: MEDICARE

## 2021-09-23 ENCOUNTER — PATIENT OUTREACH (OUTPATIENT)
Dept: ADMINISTRATIVE | Facility: OTHER | Age: 66
End: 2021-09-23

## 2021-09-23 DIAGNOSIS — R19.7 ACUTE DIARRHEA: ICD-10-CM

## 2021-09-23 DIAGNOSIS — N17.9 AKI (ACUTE KIDNEY INJURY): ICD-10-CM

## 2021-09-23 DIAGNOSIS — E86.0 DEHYDRATION: Primary | ICD-10-CM

## 2021-09-23 DIAGNOSIS — I95.9 HYPOTENSION, UNSPECIFIED HYPOTENSION TYPE: ICD-10-CM

## 2021-09-23 LAB
ALBUMIN SERPL BCP-MCNC: 3.1 G/DL (ref 3.5–5.2)
ALP SERPL-CCNC: 64 U/L (ref 55–135)
ALT SERPL W/O P-5'-P-CCNC: 23 U/L (ref 10–44)
ANION GAP SERPL CALC-SCNC: 11 MMOL/L (ref 8–16)
ANION GAP SERPL CALC-SCNC: 14 MMOL/L (ref 8–16)
AST SERPL-CCNC: 21 U/L (ref 10–40)
BASOPHILS # BLD AUTO: 0.02 K/UL (ref 0–0.2)
BASOPHILS NFR BLD: 0.2 % (ref 0–1.9)
BILIRUB SERPL-MCNC: 0.3 MG/DL (ref 0.1–1)
BUN SERPL-MCNC: 42 MG/DL (ref 8–23)
BUN SERPL-MCNC: 50 MG/DL (ref 8–23)
CALCIUM SERPL-MCNC: 8.5 MG/DL (ref 8.7–10.5)
CALCIUM SERPL-MCNC: 9.1 MG/DL (ref 8.7–10.5)
CHLORIDE SERPL-SCNC: 104 MMOL/L (ref 95–110)
CHLORIDE SERPL-SCNC: 108 MMOL/L (ref 95–110)
CO2 SERPL-SCNC: 22 MMOL/L (ref 23–29)
CO2 SERPL-SCNC: 22 MMOL/L (ref 23–29)
CREAT SERPL-MCNC: 2 MG/DL (ref 0.5–1.4)
CREAT SERPL-MCNC: 2.2 MG/DL (ref 0.5–1.4)
CTP QC/QA: YES
DIFFERENTIAL METHOD: ABNORMAL
EOSINOPHIL # BLD AUTO: 0.2 K/UL (ref 0–0.5)
EOSINOPHIL NFR BLD: 1.4 % (ref 0–8)
ERYTHROCYTE [DISTWIDTH] IN BLOOD BY AUTOMATED COUNT: 15 % (ref 11.5–14.5)
EST. GFR  (AFRICAN AMERICAN): 35 ML/MIN/1.73 M^2
EST. GFR  (AFRICAN AMERICAN): 40 ML/MIN/1.73 M^2
EST. GFR  (NON AFRICAN AMERICAN): 31 ML/MIN/1.73 M^2
EST. GFR  (NON AFRICAN AMERICAN): 34 ML/MIN/1.73 M^2
GLUCOSE SERPL-MCNC: 188 MG/DL (ref 70–110)
GLUCOSE SERPL-MCNC: 212 MG/DL (ref 70–110)
HCT VFR BLD AUTO: 33.7 % (ref 40–54)
HGB BLD-MCNC: 10.4 G/DL (ref 14–18)
IMM GRANULOCYTES # BLD AUTO: 0.03 K/UL (ref 0–0.04)
IMM GRANULOCYTES NFR BLD AUTO: 0.2 % (ref 0–0.5)
LACTATE SERPL-SCNC: 1.2 MMOL/L (ref 0.5–2.2)
LYMPHOCYTES # BLD AUTO: 1.7 K/UL (ref 1–4.8)
LYMPHOCYTES NFR BLD: 13.3 % (ref 18–48)
MAGNESIUM SERPL-MCNC: 2.1 MG/DL (ref 1.6–2.6)
MCH RBC QN AUTO: 26.9 PG (ref 27–31)
MCHC RBC AUTO-ENTMCNC: 30.9 G/DL (ref 32–36)
MCV RBC AUTO: 87 FL (ref 82–98)
MONOCYTES # BLD AUTO: 0.8 K/UL (ref 0.3–1)
MONOCYTES NFR BLD: 6.3 % (ref 4–15)
NEUTROPHILS # BLD AUTO: 10.3 K/UL (ref 1.8–7.7)
NEUTROPHILS NFR BLD: 78.6 % (ref 38–73)
NRBC BLD-RTO: 0 /100 WBC
PLATELET # BLD AUTO: 179 K/UL (ref 150–450)
PMV BLD AUTO: 13.8 FL (ref 9.2–12.9)
POTASSIUM SERPL-SCNC: 3.7 MMOL/L (ref 3.5–5.1)
POTASSIUM SERPL-SCNC: 4.6 MMOL/L (ref 3.5–5.1)
PROT SERPL-MCNC: 7.9 G/DL (ref 6–8.4)
RBC # BLD AUTO: 3.87 M/UL (ref 4.6–6.2)
SARS-COV-2 RDRP RESP QL NAA+PROBE: NEGATIVE
SODIUM SERPL-SCNC: 140 MMOL/L (ref 136–145)
SODIUM SERPL-SCNC: 141 MMOL/L (ref 136–145)
WBC # BLD AUTO: 13.06 K/UL (ref 3.9–12.7)

## 2021-09-23 PROCEDURE — 96361 HYDRATE IV INFUSION ADD-ON: CPT | Mod: HCNC

## 2021-09-23 PROCEDURE — 25000003 PHARM REV CODE 250: Mod: HCNC | Performed by: EMERGENCY MEDICINE

## 2021-09-23 PROCEDURE — U0002 COVID-19 LAB TEST NON-CDC: HCPCS | Mod: HCNC | Performed by: EMERGENCY MEDICINE

## 2021-09-23 PROCEDURE — 85025 COMPLETE CBC W/AUTO DIFF WBC: CPT | Mod: HCNC | Performed by: EMERGENCY MEDICINE

## 2021-09-23 PROCEDURE — 96360 HYDRATION IV INFUSION INIT: CPT | Mod: HCNC

## 2021-09-23 PROCEDURE — 80048 BASIC METABOLIC PNL TOTAL CA: CPT | Mod: HCNC | Performed by: EMERGENCY MEDICINE

## 2021-09-23 PROCEDURE — 83735 ASSAY OF MAGNESIUM: CPT | Mod: HCNC | Performed by: EMERGENCY MEDICINE

## 2021-09-23 PROCEDURE — 99291 CRITICAL CARE FIRST HOUR: CPT | Mod: 25,HCNC

## 2021-09-23 PROCEDURE — 83605 ASSAY OF LACTIC ACID: CPT | Mod: HCNC | Performed by: EMERGENCY MEDICINE

## 2021-09-23 PROCEDURE — 80053 COMPREHEN METABOLIC PANEL: CPT | Mod: HCNC | Performed by: EMERGENCY MEDICINE

## 2021-09-23 RX ADMIN — SODIUM CHLORIDE 1000 ML: 0.9 INJECTION, SOLUTION INTRAVENOUS at 06:09

## 2021-09-23 RX ADMIN — SODIUM CHLORIDE 500 ML: 0.9 INJECTION, SOLUTION INTRAVENOUS at 07:09

## 2021-09-23 RX ADMIN — SODIUM CHLORIDE 500 ML: 0.9 INJECTION, SOLUTION INTRAVENOUS at 06:09

## 2021-09-24 ENCOUNTER — TELEPHONE (OUTPATIENT)
Dept: PRIMARY CARE CLINIC | Facility: CLINIC | Age: 66
End: 2021-09-24

## 2021-09-24 VITALS
TEMPERATURE: 98 F | BODY MASS INDEX: 34.91 KG/M2 | HEART RATE: 80 BPM | DIASTOLIC BLOOD PRESSURE: 67 MMHG | WEIGHT: 207 LBS | SYSTOLIC BLOOD PRESSURE: 138 MMHG | OXYGEN SATURATION: 95 % | RESPIRATION RATE: 18 BRPM

## 2021-09-24 RX ORDER — FAMOTIDINE 20 MG/1
20 TABLET, FILM COATED ORAL 2 TIMES DAILY
Qty: 20 TABLET | Refills: 0 | Status: SHIPPED | OUTPATIENT
Start: 2021-09-24 | End: 2022-06-09

## 2021-09-27 ENCOUNTER — CLINICAL SUPPORT (OUTPATIENT)
Dept: PRIMARY CARE CLINIC | Facility: CLINIC | Age: 66
End: 2021-09-27
Payer: MEDICARE

## 2021-09-27 ENCOUNTER — TELEPHONE (OUTPATIENT)
Dept: PRIMARY CARE CLINIC | Facility: CLINIC | Age: 66
End: 2021-09-27

## 2021-09-27 DIAGNOSIS — G89.29 CHRONIC LEFT SHOULDER PAIN: ICD-10-CM

## 2021-09-27 DIAGNOSIS — F33.1 MDD (MAJOR DEPRESSIVE DISORDER), RECURRENT EPISODE, MODERATE: Primary | ICD-10-CM

## 2021-09-27 DIAGNOSIS — M25.512 CHRONIC LEFT SHOULDER PAIN: ICD-10-CM

## 2021-09-27 PROCEDURE — G0179 MD RECERTIFICATION HHA PT: HCPCS | Mod: ,,, | Performed by: FAMILY MEDICINE

## 2021-09-27 PROCEDURE — 99499 NO LOS: ICD-10-PCS | Mod: BHI,95,, | Performed by: SOCIAL WORKER

## 2021-09-27 PROCEDURE — G0179 PR HOME HEALTH MD RECERTIFICATION: ICD-10-PCS | Mod: ,,, | Performed by: FAMILY MEDICINE

## 2021-09-27 PROCEDURE — 99499 UNLISTED E&M SERVICE: CPT | Mod: BHI,95,, | Performed by: SOCIAL WORKER

## 2021-09-29 DIAGNOSIS — N18.9 CHRONIC KIDNEY DISEASE, UNSPECIFIED CKD STAGE: ICD-10-CM

## 2021-09-30 ENCOUNTER — TELEPHONE (OUTPATIENT)
Dept: PRIMARY CARE CLINIC | Facility: CLINIC | Age: 66
End: 2021-09-30

## 2021-10-01 ENCOUNTER — SPECIALTY PHARMACY (OUTPATIENT)
Dept: PHARMACY | Facility: CLINIC | Age: 66
End: 2021-10-01

## 2021-10-02 ENCOUNTER — EXTERNAL HOME HEALTH (OUTPATIENT)
Dept: HOME HEALTH SERVICES | Facility: HOSPITAL | Age: 66
End: 2021-10-02
Payer: MEDICARE

## 2021-10-05 ENCOUNTER — CLINICAL SUPPORT (OUTPATIENT)
Dept: PRIMARY CARE CLINIC | Facility: CLINIC | Age: 66
End: 2021-10-05
Payer: MEDICARE

## 2021-10-05 ENCOUNTER — TELEPHONE (OUTPATIENT)
Dept: PRIMARY CARE CLINIC | Facility: CLINIC | Age: 66
End: 2021-10-05

## 2021-10-05 DIAGNOSIS — M25.512 CHRONIC LEFT SHOULDER PAIN: ICD-10-CM

## 2021-10-05 DIAGNOSIS — G89.29 CHRONIC LEFT SHOULDER PAIN: ICD-10-CM

## 2021-10-05 DIAGNOSIS — F41.1 GAD (GENERALIZED ANXIETY DISORDER): ICD-10-CM

## 2021-10-05 DIAGNOSIS — F33.1 MDD (MAJOR DEPRESSIVE DISORDER), RECURRENT EPISODE, MODERATE: Primary | ICD-10-CM

## 2021-10-05 PROCEDURE — 90834 PSYTX W PT 45 MINUTES: CPT | Mod: BHI,95,, | Performed by: SOCIAL WORKER

## 2021-10-05 PROCEDURE — 90834 PR PSYCHOTHERAPY W/PATIENT, 45 MIN: ICD-10-PCS | Mod: BHI,95,, | Performed by: SOCIAL WORKER

## 2021-10-07 ENCOUNTER — DOCUMENT SCAN (OUTPATIENT)
Dept: HOME HEALTH SERVICES | Facility: HOSPITAL | Age: 66
End: 2021-10-07
Payer: MEDICARE

## 2021-10-15 ENCOUNTER — OFFICE VISIT (OUTPATIENT)
Dept: ORTHOPEDICS | Facility: CLINIC | Age: 66
End: 2021-10-15
Payer: MEDICARE

## 2021-10-15 ENCOUNTER — TELEPHONE (OUTPATIENT)
Dept: PRIMARY CARE CLINIC | Facility: CLINIC | Age: 66
End: 2021-10-15

## 2021-10-15 VITALS — WEIGHT: 207 LBS | HEIGHT: 64 IN | BODY MASS INDEX: 35.34 KG/M2

## 2021-10-15 DIAGNOSIS — E11.69 TYPE 2 DIABETES MELLITUS WITH OTHER SPECIFIED COMPLICATION, WITHOUT LONG-TERM CURRENT USE OF INSULIN: ICD-10-CM

## 2021-10-15 DIAGNOSIS — Z01.818 PREOP TESTING: ICD-10-CM

## 2021-10-15 DIAGNOSIS — M12.812 ROTATOR CUFF TEAR ARTHROPATHY OF LEFT SHOULDER: Primary | ICD-10-CM

## 2021-10-15 DIAGNOSIS — M75.102 ROTATOR CUFF TEAR ARTHROPATHY OF LEFT SHOULDER: Primary | ICD-10-CM

## 2021-10-15 PROCEDURE — 1160F RVW MEDS BY RX/DR IN RCRD: CPT | Mod: HCNC,CPTII,S$GLB, | Performed by: STUDENT IN AN ORGANIZED HEALTH CARE EDUCATION/TRAINING PROGRAM

## 2021-10-15 PROCEDURE — 99999 PR PBB SHADOW E&M-EST. PATIENT-LVL IV: CPT | Mod: PBBFAC,HCNC,, | Performed by: STUDENT IN AN ORGANIZED HEALTH CARE EDUCATION/TRAINING PROGRAM

## 2021-10-15 PROCEDURE — 1160F PR REVIEW ALL MEDS BY PRESCRIBER/CLIN PHARMACIST DOCUMENTED: ICD-10-PCS | Mod: HCNC,CPTII,S$GLB, | Performed by: STUDENT IN AN ORGANIZED HEALTH CARE EDUCATION/TRAINING PROGRAM

## 2021-10-15 PROCEDURE — 99214 OFFICE O/P EST MOD 30 MIN: CPT | Mod: HCNC,S$GLB,, | Performed by: STUDENT IN AN ORGANIZED HEALTH CARE EDUCATION/TRAINING PROGRAM

## 2021-10-15 PROCEDURE — 1159F PR MEDICATION LIST DOCUMENTED IN MEDICAL RECORD: ICD-10-PCS | Mod: HCNC,CPTII,S$GLB, | Performed by: STUDENT IN AN ORGANIZED HEALTH CARE EDUCATION/TRAINING PROGRAM

## 2021-10-15 PROCEDURE — 99214 PR OFFICE/OUTPT VISIT, EST, LEVL IV, 30-39 MIN: ICD-10-PCS | Mod: HCNC,S$GLB,, | Performed by: STUDENT IN AN ORGANIZED HEALTH CARE EDUCATION/TRAINING PROGRAM

## 2021-10-15 PROCEDURE — 4010F PR ACE/ARB THEARPY RXD/TAKEN: ICD-10-PCS | Mod: HCNC,CPTII,S$GLB, | Performed by: STUDENT IN AN ORGANIZED HEALTH CARE EDUCATION/TRAINING PROGRAM

## 2021-10-15 PROCEDURE — 3008F PR BODY MASS INDEX (BMI) DOCUMENTED: ICD-10-PCS | Mod: HCNC,CPTII,S$GLB, | Performed by: STUDENT IN AN ORGANIZED HEALTH CARE EDUCATION/TRAINING PROGRAM

## 2021-10-15 PROCEDURE — 3062F POS MACROALBUMINURIA REV: CPT | Mod: HCNC,CPTII,S$GLB, | Performed by: STUDENT IN AN ORGANIZED HEALTH CARE EDUCATION/TRAINING PROGRAM

## 2021-10-15 PROCEDURE — 3044F HG A1C LEVEL LT 7.0%: CPT | Mod: HCNC,CPTII,S$GLB, | Performed by: STUDENT IN AN ORGANIZED HEALTH CARE EDUCATION/TRAINING PROGRAM

## 2021-10-15 PROCEDURE — 4010F ACE/ARB THERAPY RXD/TAKEN: CPT | Mod: HCNC,CPTII,S$GLB, | Performed by: STUDENT IN AN ORGANIZED HEALTH CARE EDUCATION/TRAINING PROGRAM

## 2021-10-15 PROCEDURE — 3008F BODY MASS INDEX DOCD: CPT | Mod: HCNC,CPTII,S$GLB, | Performed by: STUDENT IN AN ORGANIZED HEALTH CARE EDUCATION/TRAINING PROGRAM

## 2021-10-15 PROCEDURE — 3066F PR DOCUMENTATION OF TREATMENT FOR NEPHROPATHY: ICD-10-PCS | Mod: HCNC,CPTII,S$GLB, | Performed by: STUDENT IN AN ORGANIZED HEALTH CARE EDUCATION/TRAINING PROGRAM

## 2021-10-15 PROCEDURE — 1159F MED LIST DOCD IN RCRD: CPT | Mod: HCNC,CPTII,S$GLB, | Performed by: STUDENT IN AN ORGANIZED HEALTH CARE EDUCATION/TRAINING PROGRAM

## 2021-10-15 PROCEDURE — 3062F PR POS MACROALBUMINURIA RESULT DOCUMENTED/REVIEW: ICD-10-PCS | Mod: HCNC,CPTII,S$GLB, | Performed by: STUDENT IN AN ORGANIZED HEALTH CARE EDUCATION/TRAINING PROGRAM

## 2021-10-15 PROCEDURE — 99999 PR PBB SHADOW E&M-EST. PATIENT-LVL IV: ICD-10-PCS | Mod: PBBFAC,HCNC,, | Performed by: STUDENT IN AN ORGANIZED HEALTH CARE EDUCATION/TRAINING PROGRAM

## 2021-10-15 PROCEDURE — 3066F NEPHROPATHY DOC TX: CPT | Mod: HCNC,CPTII,S$GLB, | Performed by: STUDENT IN AN ORGANIZED HEALTH CARE EDUCATION/TRAINING PROGRAM

## 2021-10-15 PROCEDURE — 3044F PR MOST RECENT HEMOGLOBIN A1C LEVEL <7.0%: ICD-10-PCS | Mod: HCNC,CPTII,S$GLB, | Performed by: STUDENT IN AN ORGANIZED HEALTH CARE EDUCATION/TRAINING PROGRAM

## 2021-10-18 ENCOUNTER — TELEPHONE (OUTPATIENT)
Dept: PRIMARY CARE CLINIC | Facility: CLINIC | Age: 66
End: 2021-10-18

## 2021-10-20 ENCOUNTER — OUTPATIENT CASE MANAGEMENT (OUTPATIENT)
Dept: ADMINISTRATIVE | Facility: OTHER | Age: 66
End: 2021-10-20
Payer: MEDICARE

## 2021-10-20 ENCOUNTER — PATIENT MESSAGE (OUTPATIENT)
Dept: ADMINISTRATIVE | Facility: OTHER | Age: 66
End: 2021-10-20
Payer: MEDICARE

## 2021-10-21 ENCOUNTER — TELEPHONE (OUTPATIENT)
Dept: PRIMARY CARE CLINIC | Facility: CLINIC | Age: 66
End: 2021-10-21

## 2021-10-21 ENCOUNTER — TELEPHONE (OUTPATIENT)
Dept: INTERNAL MEDICINE | Facility: CLINIC | Age: 66
End: 2021-10-21

## 2021-10-22 ENCOUNTER — OFFICE VISIT (OUTPATIENT)
Dept: CARDIOLOGY | Facility: CLINIC | Age: 66
End: 2021-10-22
Payer: MEDICARE

## 2021-10-22 VITALS
OXYGEN SATURATION: 96 % | HEIGHT: 64 IN | BODY MASS INDEX: 32.48 KG/M2 | RESPIRATION RATE: 16 BRPM | DIASTOLIC BLOOD PRESSURE: 72 MMHG | HEART RATE: 86 BPM | WEIGHT: 190.25 LBS | SYSTOLIC BLOOD PRESSURE: 104 MMHG

## 2021-10-22 DIAGNOSIS — I25.110 ATHEROSCLEROSIS OF NATIVE CORONARY ARTERY OF NATIVE HEART WITH UNSTABLE ANGINA PECTORIS: ICD-10-CM

## 2021-10-22 DIAGNOSIS — I48.0 PAF (PAROXYSMAL ATRIAL FIBRILLATION): Chronic | ICD-10-CM

## 2021-10-22 DIAGNOSIS — I20.1 CORONARY VASOSPASM: ICD-10-CM

## 2021-10-22 DIAGNOSIS — I50.42 CHRONIC COMBINED SYSTOLIC AND DIASTOLIC CONGESTIVE HEART FAILURE: Primary | Chronic | ICD-10-CM

## 2021-10-22 DIAGNOSIS — I25.118 CORONARY ARTERY DISEASE OF NATIVE ARTERY OF NATIVE HEART WITH STABLE ANGINA PECTORIS: Chronic | ICD-10-CM

## 2021-10-22 DIAGNOSIS — I10 UNCONTROLLED HYPERTENSION: ICD-10-CM

## 2021-10-22 PROCEDURE — 3066F NEPHROPATHY DOC TX: CPT | Mod: HCNC,CPTII,S$GLB, | Performed by: INTERNAL MEDICINE

## 2021-10-22 PROCEDURE — 3062F PR POS MACROALBUMINURIA RESULT DOCUMENTED/REVIEW: ICD-10-PCS | Mod: HCNC,CPTII,S$GLB, | Performed by: INTERNAL MEDICINE

## 2021-10-22 PROCEDURE — 3074F SYST BP LT 130 MM HG: CPT | Mod: HCNC,CPTII,S$GLB, | Performed by: INTERNAL MEDICINE

## 2021-10-22 PROCEDURE — 3066F PR DOCUMENTATION OF TREATMENT FOR NEPHROPATHY: ICD-10-PCS | Mod: HCNC,CPTII,S$GLB, | Performed by: INTERNAL MEDICINE

## 2021-10-22 PROCEDURE — 1160F PR REVIEW ALL MEDS BY PRESCRIBER/CLIN PHARMACIST DOCUMENTED: ICD-10-PCS | Mod: HCNC,CPTII,S$GLB, | Performed by: INTERNAL MEDICINE

## 2021-10-22 PROCEDURE — 3062F POS MACROALBUMINURIA REV: CPT | Mod: HCNC,CPTII,S$GLB, | Performed by: INTERNAL MEDICINE

## 2021-10-22 PROCEDURE — 1159F MED LIST DOCD IN RCRD: CPT | Mod: HCNC,CPTII,S$GLB, | Performed by: INTERNAL MEDICINE

## 2021-10-22 PROCEDURE — 1160F RVW MEDS BY RX/DR IN RCRD: CPT | Mod: HCNC,CPTII,S$GLB, | Performed by: INTERNAL MEDICINE

## 2021-10-22 PROCEDURE — 99214 OFFICE O/P EST MOD 30 MIN: CPT | Mod: HCNC,S$GLB,, | Performed by: INTERNAL MEDICINE

## 2021-10-22 PROCEDURE — 1159F PR MEDICATION LIST DOCUMENTED IN MEDICAL RECORD: ICD-10-PCS | Mod: HCNC,CPTII,S$GLB, | Performed by: INTERNAL MEDICINE

## 2021-10-22 PROCEDURE — 4010F PR ACE/ARB THEARPY RXD/TAKEN: ICD-10-PCS | Mod: HCNC,CPTII,S$GLB, | Performed by: INTERNAL MEDICINE

## 2021-10-22 PROCEDURE — 3008F PR BODY MASS INDEX (BMI) DOCUMENTED: ICD-10-PCS | Mod: HCNC,CPTII,S$GLB, | Performed by: INTERNAL MEDICINE

## 2021-10-22 PROCEDURE — 99999 PR PBB SHADOW E&M-EST. PATIENT-LVL V: ICD-10-PCS | Mod: PBBFAC,HCNC,, | Performed by: INTERNAL MEDICINE

## 2021-10-22 PROCEDURE — 3044F HG A1C LEVEL LT 7.0%: CPT | Mod: HCNC,CPTII,S$GLB, | Performed by: INTERNAL MEDICINE

## 2021-10-22 PROCEDURE — 99999 PR PBB SHADOW E&M-EST. PATIENT-LVL V: CPT | Mod: PBBFAC,HCNC,, | Performed by: INTERNAL MEDICINE

## 2021-10-22 PROCEDURE — 3044F PR MOST RECENT HEMOGLOBIN A1C LEVEL <7.0%: ICD-10-PCS | Mod: HCNC,CPTII,S$GLB, | Performed by: INTERNAL MEDICINE

## 2021-10-22 PROCEDURE — 99214 PR OFFICE/OUTPT VISIT, EST, LEVL IV, 30-39 MIN: ICD-10-PCS | Mod: HCNC,S$GLB,, | Performed by: INTERNAL MEDICINE

## 2021-10-22 PROCEDURE — 3078F PR MOST RECENT DIASTOLIC BLOOD PRESSURE < 80 MM HG: ICD-10-PCS | Mod: HCNC,CPTII,S$GLB, | Performed by: INTERNAL MEDICINE

## 2021-10-22 PROCEDURE — 3078F DIAST BP <80 MM HG: CPT | Mod: HCNC,CPTII,S$GLB, | Performed by: INTERNAL MEDICINE

## 2021-10-22 PROCEDURE — 3074F PR MOST RECENT SYSTOLIC BLOOD PRESSURE < 130 MM HG: ICD-10-PCS | Mod: HCNC,CPTII,S$GLB, | Performed by: INTERNAL MEDICINE

## 2021-10-22 PROCEDURE — 3008F BODY MASS INDEX DOCD: CPT | Mod: HCNC,CPTII,S$GLB, | Performed by: INTERNAL MEDICINE

## 2021-10-22 PROCEDURE — 4010F ACE/ARB THERAPY RXD/TAKEN: CPT | Mod: HCNC,CPTII,S$GLB, | Performed by: INTERNAL MEDICINE

## 2021-10-22 RX ORDER — LISINOPRIL 10 MG/1
10 TABLET ORAL DAILY
Qty: 90 TABLET | Refills: 2 | Status: SHIPPED | OUTPATIENT
Start: 2021-10-22 | End: 2021-10-22 | Stop reason: SDUPTHER

## 2021-10-22 RX ORDER — LISINOPRIL 30 MG/1
30 TABLET ORAL DAILY
Qty: 90 TABLET | Refills: 2 | Status: SHIPPED | OUTPATIENT
Start: 2021-10-22 | End: 2021-11-15 | Stop reason: ALTCHOICE

## 2021-10-29 ENCOUNTER — PATIENT MESSAGE (OUTPATIENT)
Dept: PHARMACY | Facility: CLINIC | Age: 66
End: 2021-10-29
Payer: MEDICARE

## 2021-11-01 ENCOUNTER — OUTPATIENT CASE MANAGEMENT (OUTPATIENT)
Dept: ADMINISTRATIVE | Facility: OTHER | Age: 66
End: 2021-11-01
Payer: MEDICARE

## 2021-11-02 ENCOUNTER — PATIENT OUTREACH (OUTPATIENT)
Dept: ADMINISTRATIVE | Facility: OTHER | Age: 66
End: 2021-11-02
Payer: MEDICARE

## 2021-11-02 ENCOUNTER — OUTPATIENT CASE MANAGEMENT (OUTPATIENT)
Dept: ADMINISTRATIVE | Facility: OTHER | Age: 66
End: 2021-11-02
Payer: MEDICARE

## 2021-11-03 ENCOUNTER — TELEPHONE (OUTPATIENT)
Dept: PHARMACY | Facility: CLINIC | Age: 66
End: 2021-11-03
Payer: MEDICARE

## 2021-11-03 ENCOUNTER — PATIENT MESSAGE (OUTPATIENT)
Dept: PHARMACY | Facility: CLINIC | Age: 66
End: 2021-11-03
Payer: MEDICARE

## 2021-11-08 ENCOUNTER — SPECIALTY PHARMACY (OUTPATIENT)
Dept: PHARMACY | Facility: CLINIC | Age: 66
End: 2021-11-08
Payer: MEDICARE

## 2021-11-08 ENCOUNTER — TELEPHONE (OUTPATIENT)
Dept: PRIMARY CARE CLINIC | Facility: CLINIC | Age: 66
End: 2021-11-08
Payer: MEDICARE

## 2021-11-11 ENCOUNTER — OFFICE VISIT (OUTPATIENT)
Dept: DIABETES | Facility: CLINIC | Age: 66
End: 2021-11-11
Payer: MEDICARE

## 2021-11-11 VITALS — SYSTOLIC BLOOD PRESSURE: 85 MMHG | DIASTOLIC BLOOD PRESSURE: 57 MMHG | HEART RATE: 81 BPM

## 2021-11-11 DIAGNOSIS — I50.32 CHRONIC DIASTOLIC HEART FAILURE: ICD-10-CM

## 2021-11-11 DIAGNOSIS — E78.5 HYPERLIPIDEMIA ASSOCIATED WITH TYPE 2 DIABETES MELLITUS: ICD-10-CM

## 2021-11-11 DIAGNOSIS — I25.118 CORONARY ARTERY DISEASE OF NATIVE ARTERY OF NATIVE HEART WITH STABLE ANGINA PECTORIS: ICD-10-CM

## 2021-11-11 DIAGNOSIS — E66.9 OBESITY (BMI 30.0-34.9): ICD-10-CM

## 2021-11-11 DIAGNOSIS — E11.42 DIABETIC POLYNEUROPATHY ASSOCIATED WITH TYPE 2 DIABETES MELLITUS: ICD-10-CM

## 2021-11-11 DIAGNOSIS — E21.5 DISORDER OF PARATHYROID GLAND: ICD-10-CM

## 2021-11-11 DIAGNOSIS — N52.9 ERECTILE DYSFUNCTION, UNSPECIFIED ERECTILE DYSFUNCTION TYPE: ICD-10-CM

## 2021-11-11 DIAGNOSIS — E29.1 HYPOGONADISM IN MALE: ICD-10-CM

## 2021-11-11 DIAGNOSIS — E11.65 UNCONTROLLED TYPE 2 DIABETES MELLITUS WITH HYPERGLYCEMIA: Primary | ICD-10-CM

## 2021-11-11 DIAGNOSIS — I15.2 HYPERTENSION ASSOCIATED WITH DIABETES: ICD-10-CM

## 2021-11-11 DIAGNOSIS — E11.69 HYPERLIPIDEMIA ASSOCIATED WITH TYPE 2 DIABETES MELLITUS: ICD-10-CM

## 2021-11-11 DIAGNOSIS — E11.59 HYPERTENSION ASSOCIATED WITH DIABETES: ICD-10-CM

## 2021-11-11 DIAGNOSIS — N18.30 STAGE 3 CHRONIC KIDNEY DISEASE, UNSPECIFIED WHETHER STAGE 3A OR 3B CKD: ICD-10-CM

## 2021-11-11 DIAGNOSIS — E11.3312 MODERATE NONPROLIFERATIVE DIABETIC RETINOPATHY OF LEFT EYE WITH MACULAR EDEMA ASSOCIATED WITH TYPE 2 DIABETES MELLITUS: ICD-10-CM

## 2021-11-11 LAB — GLUCOSE SERPL-MCNC: 202 MG/DL (ref 70–110)

## 2021-11-11 PROCEDURE — 3062F POS MACROALBUMINURIA REV: CPT | Mod: HCNC,CPTII,S$GLB, | Performed by: PHYSICIAN ASSISTANT

## 2021-11-11 PROCEDURE — 82962 POCT GLUCOSE, HAND-HELD DEVICE: ICD-10-PCS | Mod: HCNC,S$GLB,, | Performed by: PHYSICIAN ASSISTANT

## 2021-11-11 PROCEDURE — 3044F PR MOST RECENT HEMOGLOBIN A1C LEVEL <7.0%: ICD-10-PCS | Mod: HCNC,CPTII,S$GLB, | Performed by: PHYSICIAN ASSISTANT

## 2021-11-11 PROCEDURE — 3044F HG A1C LEVEL LT 7.0%: CPT | Mod: HCNC,CPTII,S$GLB, | Performed by: PHYSICIAN ASSISTANT

## 2021-11-11 PROCEDURE — 3074F SYST BP LT 130 MM HG: CPT | Mod: HCNC,CPTII,S$GLB, | Performed by: PHYSICIAN ASSISTANT

## 2021-11-11 PROCEDURE — 99214 PR OFFICE/OUTPT VISIT, EST, LEVL IV, 30-39 MIN: ICD-10-PCS | Mod: HCNC,S$GLB,, | Performed by: PHYSICIAN ASSISTANT

## 2021-11-11 PROCEDURE — 3066F NEPHROPATHY DOC TX: CPT | Mod: HCNC,CPTII,S$GLB, | Performed by: PHYSICIAN ASSISTANT

## 2021-11-11 PROCEDURE — 3078F DIAST BP <80 MM HG: CPT | Mod: HCNC,CPTII,S$GLB, | Performed by: PHYSICIAN ASSISTANT

## 2021-11-11 PROCEDURE — 1159F PR MEDICATION LIST DOCUMENTED IN MEDICAL RECORD: ICD-10-PCS | Mod: HCNC,CPTII,S$GLB, | Performed by: PHYSICIAN ASSISTANT

## 2021-11-11 PROCEDURE — 3078F PR MOST RECENT DIASTOLIC BLOOD PRESSURE < 80 MM HG: ICD-10-PCS | Mod: HCNC,CPTII,S$GLB, | Performed by: PHYSICIAN ASSISTANT

## 2021-11-11 PROCEDURE — 1159F MED LIST DOCD IN RCRD: CPT | Mod: HCNC,CPTII,S$GLB, | Performed by: PHYSICIAN ASSISTANT

## 2021-11-11 PROCEDURE — 4010F PR ACE/ARB THEARPY RXD/TAKEN: ICD-10-PCS | Mod: HCNC,CPTII,S$GLB, | Performed by: PHYSICIAN ASSISTANT

## 2021-11-11 PROCEDURE — 3066F PR DOCUMENTATION OF TREATMENT FOR NEPHROPATHY: ICD-10-PCS | Mod: HCNC,CPTII,S$GLB, | Performed by: PHYSICIAN ASSISTANT

## 2021-11-11 PROCEDURE — 4010F ACE/ARB THERAPY RXD/TAKEN: CPT | Mod: HCNC,CPTII,S$GLB, | Performed by: PHYSICIAN ASSISTANT

## 2021-11-11 PROCEDURE — 99999 PR PBB SHADOW E&M-EST. PATIENT-LVL V: CPT | Mod: PBBFAC,HCNC,, | Performed by: PHYSICIAN ASSISTANT

## 2021-11-11 PROCEDURE — 82962 GLUCOSE BLOOD TEST: CPT | Mod: HCNC,S$GLB,, | Performed by: PHYSICIAN ASSISTANT

## 2021-11-11 PROCEDURE — 99999 PR PBB SHADOW E&M-EST. PATIENT-LVL V: ICD-10-PCS | Mod: PBBFAC,HCNC,, | Performed by: PHYSICIAN ASSISTANT

## 2021-11-11 PROCEDURE — 3062F PR POS MACROALBUMINURIA RESULT DOCUMENTED/REVIEW: ICD-10-PCS | Mod: HCNC,CPTII,S$GLB, | Performed by: PHYSICIAN ASSISTANT

## 2021-11-11 PROCEDURE — 3074F PR MOST RECENT SYSTOLIC BLOOD PRESSURE < 130 MM HG: ICD-10-PCS | Mod: HCNC,CPTII,S$GLB, | Performed by: PHYSICIAN ASSISTANT

## 2021-11-11 PROCEDURE — 99214 OFFICE O/P EST MOD 30 MIN: CPT | Mod: HCNC,S$GLB,, | Performed by: PHYSICIAN ASSISTANT

## 2021-11-11 RX ORDER — INSULIN DEGLUDEC 200 U/ML
40 INJECTION, SOLUTION SUBCUTANEOUS DAILY
Qty: 6 PEN | Refills: 3 | Status: SHIPPED | OUTPATIENT
Start: 2021-11-11 | End: 2022-09-22

## 2021-11-15 ENCOUNTER — OFFICE VISIT (OUTPATIENT)
Dept: INTERNAL MEDICINE | Facility: CLINIC | Age: 66
End: 2021-11-15
Payer: MEDICARE

## 2021-11-15 ENCOUNTER — TELEPHONE (OUTPATIENT)
Dept: CARDIOLOGY | Facility: CLINIC | Age: 66
End: 2021-11-15
Payer: MEDICARE

## 2021-11-15 VITALS
SYSTOLIC BLOOD PRESSURE: 110 MMHG | BODY MASS INDEX: 33.12 KG/M2 | HEIGHT: 64 IN | RESPIRATION RATE: 16 BRPM | OXYGEN SATURATION: 98 % | DIASTOLIC BLOOD PRESSURE: 70 MMHG | WEIGHT: 194 LBS | TEMPERATURE: 98 F | HEART RATE: 76 BPM

## 2021-11-15 DIAGNOSIS — I50.42 CHRONIC COMBINED SYSTOLIC AND DIASTOLIC CONGESTIVE HEART FAILURE: Chronic | ICD-10-CM

## 2021-11-15 DIAGNOSIS — F33.1 MDD (MAJOR DEPRESSIVE DISORDER), RECURRENT EPISODE, MODERATE: ICD-10-CM

## 2021-11-15 DIAGNOSIS — I10 ESSENTIAL HYPERTENSION: ICD-10-CM

## 2021-11-15 DIAGNOSIS — Z12.11 COLON CANCER SCREENING: Primary | ICD-10-CM

## 2021-11-15 PROBLEM — R07.9 CHEST PAIN: Status: RESOLVED | Noted: 2021-06-17 | Resolved: 2021-11-15

## 2021-11-15 PROCEDURE — 4010F ACE/ARB THERAPY RXD/TAKEN: CPT | Mod: HCNC,CPTII,S$GLB, | Performed by: FAMILY MEDICINE

## 2021-11-15 PROCEDURE — 3044F HG A1C LEVEL LT 7.0%: CPT | Mod: HCNC,CPTII,S$GLB, | Performed by: FAMILY MEDICINE

## 2021-11-15 PROCEDURE — 3008F PR BODY MASS INDEX (BMI) DOCUMENTED: ICD-10-PCS | Mod: HCNC,CPTII,S$GLB, | Performed by: FAMILY MEDICINE

## 2021-11-15 PROCEDURE — 3074F PR MOST RECENT SYSTOLIC BLOOD PRESSURE < 130 MM HG: ICD-10-PCS | Mod: HCNC,CPTII,S$GLB, | Performed by: FAMILY MEDICINE

## 2021-11-15 PROCEDURE — 3062F POS MACROALBUMINURIA REV: CPT | Mod: HCNC,CPTII,S$GLB, | Performed by: FAMILY MEDICINE

## 2021-11-15 PROCEDURE — 3066F NEPHROPATHY DOC TX: CPT | Mod: HCNC,CPTII,S$GLB, | Performed by: FAMILY MEDICINE

## 2021-11-15 PROCEDURE — 99999 PR PBB SHADOW E&M-EST. PATIENT-LVL V: ICD-10-PCS | Mod: PBBFAC,HCNC,, | Performed by: FAMILY MEDICINE

## 2021-11-15 PROCEDURE — 3074F SYST BP LT 130 MM HG: CPT | Mod: HCNC,CPTII,S$GLB, | Performed by: FAMILY MEDICINE

## 2021-11-15 PROCEDURE — 3066F PR DOCUMENTATION OF TREATMENT FOR NEPHROPATHY: ICD-10-PCS | Mod: HCNC,CPTII,S$GLB, | Performed by: FAMILY MEDICINE

## 2021-11-15 PROCEDURE — 3062F PR POS MACROALBUMINURIA RESULT DOCUMENTED/REVIEW: ICD-10-PCS | Mod: HCNC,CPTII,S$GLB, | Performed by: FAMILY MEDICINE

## 2021-11-15 PROCEDURE — 3044F PR MOST RECENT HEMOGLOBIN A1C LEVEL <7.0%: ICD-10-PCS | Mod: HCNC,CPTII,S$GLB, | Performed by: FAMILY MEDICINE

## 2021-11-15 PROCEDURE — 99214 PR OFFICE/OUTPT VISIT, EST, LEVL IV, 30-39 MIN: ICD-10-PCS | Mod: HCNC,S$GLB,, | Performed by: FAMILY MEDICINE

## 2021-11-15 PROCEDURE — 3078F DIAST BP <80 MM HG: CPT | Mod: HCNC,CPTII,S$GLB, | Performed by: FAMILY MEDICINE

## 2021-11-15 PROCEDURE — 1159F MED LIST DOCD IN RCRD: CPT | Mod: HCNC,CPTII,S$GLB, | Performed by: FAMILY MEDICINE

## 2021-11-15 PROCEDURE — 1159F PR MEDICATION LIST DOCUMENTED IN MEDICAL RECORD: ICD-10-PCS | Mod: HCNC,CPTII,S$GLB, | Performed by: FAMILY MEDICINE

## 2021-11-15 PROCEDURE — 99999 PR PBB SHADOW E&M-EST. PATIENT-LVL V: CPT | Mod: PBBFAC,HCNC,, | Performed by: FAMILY MEDICINE

## 2021-11-15 PROCEDURE — 4010F PR ACE/ARB THEARPY RXD/TAKEN: ICD-10-PCS | Mod: HCNC,CPTII,S$GLB, | Performed by: FAMILY MEDICINE

## 2021-11-15 PROCEDURE — 3078F PR MOST RECENT DIASTOLIC BLOOD PRESSURE < 80 MM HG: ICD-10-PCS | Mod: HCNC,CPTII,S$GLB, | Performed by: FAMILY MEDICINE

## 2021-11-15 PROCEDURE — 3008F BODY MASS INDEX DOCD: CPT | Mod: HCNC,CPTII,S$GLB, | Performed by: FAMILY MEDICINE

## 2021-11-15 PROCEDURE — 99214 OFFICE O/P EST MOD 30 MIN: CPT | Mod: HCNC,S$GLB,, | Performed by: FAMILY MEDICINE

## 2021-11-15 RX ORDER — LISINOPRIL 10 MG/1
10 TABLET ORAL DAILY
COMMUNITY
Start: 2021-10-22 | End: 2022-02-15 | Stop reason: ALTCHOICE

## 2021-11-15 RX ORDER — SERTRALINE HYDROCHLORIDE 100 MG/1
100 TABLET, FILM COATED ORAL NIGHTLY
Qty: 90 TABLET | Refills: 2 | Status: SHIPPED | OUTPATIENT
Start: 2021-11-15 | End: 2022-06-09 | Stop reason: SDUPTHER

## 2021-11-15 RX ORDER — METOPROLOL SUCCINATE 50 MG/1
50 TABLET, EXTENDED RELEASE ORAL NIGHTLY
COMMUNITY
Start: 2021-10-20 | End: 2022-02-10

## 2021-11-16 DIAGNOSIS — R06.09 DYSPNEA ON EXERTION: Primary | ICD-10-CM

## 2021-11-18 ENCOUNTER — OUTPATIENT CASE MANAGEMENT (OUTPATIENT)
Dept: ADMINISTRATIVE | Facility: OTHER | Age: 66
End: 2021-11-18
Payer: MEDICARE

## 2021-11-24 ENCOUNTER — OUTPATIENT CASE MANAGEMENT (OUTPATIENT)
Dept: ADMINISTRATIVE | Facility: OTHER | Age: 66
End: 2021-11-24
Payer: MEDICARE

## 2021-11-24 ENCOUNTER — HOSPITAL ENCOUNTER (OUTPATIENT)
Dept: RADIOLOGY | Facility: HOSPITAL | Age: 66
Discharge: HOME | End: 2021-11-24
Attending: INTERNAL MEDICINE
Payer: MEDICARE

## 2021-11-24 ENCOUNTER — OFFICE VISIT (OUTPATIENT)
Dept: PULMONOLOGY | Facility: CLINIC | Age: 66
End: 2021-11-24
Payer: MEDICARE

## 2021-11-24 VITALS
RESPIRATION RATE: 17 BRPM | BODY MASS INDEX: 32.32 KG/M2 | SYSTOLIC BLOOD PRESSURE: 136 MMHG | WEIGHT: 194 LBS | DIASTOLIC BLOOD PRESSURE: 74 MMHG | OXYGEN SATURATION: 95 % | HEIGHT: 65 IN | HEART RATE: 70 BPM

## 2021-11-24 DIAGNOSIS — J81.0 ACUTE PULMONARY EDEMA: ICD-10-CM

## 2021-11-24 DIAGNOSIS — J45.41 MODERATE PERSISTENT ASTHMA WITH ACUTE EXACERBATION: ICD-10-CM

## 2021-11-24 DIAGNOSIS — R06.09 DYSPNEA ON EXERTION: ICD-10-CM

## 2021-11-24 DIAGNOSIS — I50.1 CARDIAC ASTHMA: Primary | ICD-10-CM

## 2021-11-24 DIAGNOSIS — J45.40 MODERATE PERSISTENT ASTHMA WITHOUT COMPLICATION: ICD-10-CM

## 2021-11-24 DIAGNOSIS — J45.21 INTERMITTENT ASTHMA WITH ACUTE EXACERBATION, UNSPECIFIED ASTHMA SEVERITY: ICD-10-CM

## 2021-11-24 PROCEDURE — 99215 PR OFFICE/OUTPT VISIT, EST, LEVL V, 40-54 MIN: ICD-10-PCS | Mod: HCNC,S$GLB,, | Performed by: INTERNAL MEDICINE

## 2021-11-24 PROCEDURE — 99215 OFFICE O/P EST HI 40 MIN: CPT | Mod: PBBFAC,25 | Performed by: INTERNAL MEDICINE

## 2021-11-24 PROCEDURE — 99999 PR PBB SHADOW E&M-EST. PATIENT-LVL V: CPT | Mod: PBBFAC,HCNC,, | Performed by: INTERNAL MEDICINE

## 2021-11-24 PROCEDURE — 71046 XR CHEST PA AND LATERAL: ICD-10-PCS | Mod: 26,HCNC,, | Performed by: RADIOLOGY

## 2021-11-24 PROCEDURE — 99499 RISK ADDL DX/OHS AUDIT: ICD-10-PCS | Mod: HCNC,S$GLB,, | Performed by: INTERNAL MEDICINE

## 2021-11-24 PROCEDURE — 71046 X-RAY EXAM CHEST 2 VIEWS: CPT | Mod: 26,HCNC,, | Performed by: RADIOLOGY

## 2021-11-24 PROCEDURE — 99499 UNLISTED E&M SERVICE: CPT | Mod: HCNC,S$GLB,, | Performed by: INTERNAL MEDICINE

## 2021-11-24 PROCEDURE — 99999 PR PBB SHADOW E&M-EST. PATIENT-LVL V: ICD-10-PCS | Mod: PBBFAC,HCNC,, | Performed by: INTERNAL MEDICINE

## 2021-11-24 PROCEDURE — 71046 X-RAY EXAM CHEST 2 VIEWS: CPT | Mod: TC,HCNC

## 2021-11-24 PROCEDURE — 99215 OFFICE O/P EST HI 40 MIN: CPT | Mod: HCNC,S$GLB,, | Performed by: INTERNAL MEDICINE

## 2021-11-24 RX ORDER — ALBUTEROL SULFATE 0.83 MG/ML
2.5 SOLUTION RESPIRATORY (INHALATION)
Qty: 360 ML | Refills: 11 | Status: SHIPPED | OUTPATIENT
Start: 2021-11-24 | End: 2022-10-17

## 2021-11-24 RX ORDER — FUROSEMIDE 40 MG/1
40 TABLET ORAL DAILY
Qty: 270 TABLET | Refills: 1 | Status: SHIPPED | OUTPATIENT
Start: 2021-11-24 | End: 2022-01-29 | Stop reason: SDUPTHER

## 2021-11-24 RX ORDER — FLUTICASONE PROPIONATE AND SALMETEROL 250; 50 UG/1; UG/1
1 POWDER RESPIRATORY (INHALATION) 2 TIMES DAILY
Qty: 180 EACH | Refills: 3 | Status: SHIPPED | OUTPATIENT
Start: 2021-11-24 | End: 2022-10-17

## 2021-11-24 RX ORDER — AZITHROMYCIN 250 MG/1
TABLET, FILM COATED ORAL
Qty: 6 TABLET | Refills: 0 | Status: ON HOLD | OUTPATIENT
Start: 2021-11-24 | End: 2022-01-10 | Stop reason: HOSPADM

## 2021-11-24 RX ORDER — METHYLPREDNISOLONE 4 MG/1
TABLET ORAL
Qty: 1 EACH | Refills: 0 | Status: ON HOLD | OUTPATIENT
Start: 2021-11-24 | End: 2022-01-10 | Stop reason: HOSPADM

## 2021-11-24 RX ORDER — INSULIN PUMP SYRINGE, 3 ML
EACH MISCELLANEOUS
Qty: 1 EACH | Refills: 0 | Status: SHIPPED | OUTPATIENT
Start: 2021-11-24 | End: 2021-11-29 | Stop reason: SDUPTHER

## 2021-11-24 RX ORDER — TIOTROPIUM BROMIDE 18 UG/1
CAPSULE ORAL; RESPIRATORY (INHALATION)
Qty: 90 CAPSULE | Refills: 3 | Status: SHIPPED | OUTPATIENT
Start: 2021-11-24 | End: 2022-05-31 | Stop reason: SDUPTHER

## 2021-11-26 ENCOUNTER — OFFICE VISIT (OUTPATIENT)
Dept: CARDIOLOGY | Facility: CLINIC | Age: 66
End: 2021-11-26
Payer: MEDICARE

## 2021-11-26 VITALS
WEIGHT: 194 LBS | HEART RATE: 69 BPM | DIASTOLIC BLOOD PRESSURE: 76 MMHG | SYSTOLIC BLOOD PRESSURE: 126 MMHG | OXYGEN SATURATION: 95 % | BODY MASS INDEX: 32.28 KG/M2

## 2021-11-26 DIAGNOSIS — E78.5 HYPERLIPIDEMIA ASSOCIATED WITH TYPE 2 DIABETES MELLITUS: ICD-10-CM

## 2021-11-26 DIAGNOSIS — I50.1 CARDIAC ASTHMA: ICD-10-CM

## 2021-11-26 DIAGNOSIS — I25.118 CORONARY ARTERY DISEASE OF NATIVE ARTERY OF NATIVE HEART WITH STABLE ANGINA PECTORIS: Chronic | ICD-10-CM

## 2021-11-26 DIAGNOSIS — I48.0 PAF (PAROXYSMAL ATRIAL FIBRILLATION): Chronic | ICD-10-CM

## 2021-11-26 DIAGNOSIS — E11.69 HYPERLIPIDEMIA ASSOCIATED WITH TYPE 2 DIABETES MELLITUS: ICD-10-CM

## 2021-11-26 DIAGNOSIS — J44.9 CHRONIC OBSTRUCTIVE PULMONARY DISEASE, UNSPECIFIED COPD TYPE: ICD-10-CM

## 2021-11-26 DIAGNOSIS — Z01.810 PREOP CARDIOVASCULAR EXAM: Primary | ICD-10-CM

## 2021-11-26 DIAGNOSIS — I50.42 CHRONIC COMBINED SYSTOLIC AND DIASTOLIC CONGESTIVE HEART FAILURE: Chronic | ICD-10-CM

## 2021-11-26 DIAGNOSIS — L97.523 DIABETIC ULCER OF TOE OF LEFT FOOT ASSOCIATED WITH TYPE 2 DIABETES MELLITUS, WITH NECROSIS OF MUSCLE: ICD-10-CM

## 2021-11-26 DIAGNOSIS — I10 ESSENTIAL HYPERTENSION: ICD-10-CM

## 2021-11-26 DIAGNOSIS — E11.621 DIABETIC ULCER OF TOE OF LEFT FOOT ASSOCIATED WITH TYPE 2 DIABETES MELLITUS, WITH NECROSIS OF MUSCLE: ICD-10-CM

## 2021-11-26 PROCEDURE — 3066F PR DOCUMENTATION OF TREATMENT FOR NEPHROPATHY: ICD-10-PCS | Mod: HCNC,CPTII,S$GLB, | Performed by: INTERNAL MEDICINE

## 2021-11-26 PROCEDURE — 99214 OFFICE O/P EST MOD 30 MIN: CPT | Mod: HCNC,S$GLB,, | Performed by: INTERNAL MEDICINE

## 2021-11-26 PROCEDURE — 3062F PR POS MACROALBUMINURIA RESULT DOCUMENTED/REVIEW: ICD-10-PCS | Mod: HCNC,CPTII,S$GLB, | Performed by: INTERNAL MEDICINE

## 2021-11-26 PROCEDURE — 4010F PR ACE/ARB THEARPY RXD/TAKEN: ICD-10-PCS | Mod: HCNC,CPTII,S$GLB, | Performed by: INTERNAL MEDICINE

## 2021-11-26 PROCEDURE — G0179 PR HOME HEALTH MD RECERTIFICATION: ICD-10-PCS | Mod: ,,, | Performed by: FAMILY MEDICINE

## 2021-11-26 PROCEDURE — 3066F NEPHROPATHY DOC TX: CPT | Mod: HCNC,CPTII,S$GLB, | Performed by: INTERNAL MEDICINE

## 2021-11-26 PROCEDURE — 4010F ACE/ARB THERAPY RXD/TAKEN: CPT | Mod: HCNC,CPTII,S$GLB, | Performed by: INTERNAL MEDICINE

## 2021-11-26 PROCEDURE — 99999 PR PBB SHADOW E&M-EST. PATIENT-LVL IV: ICD-10-PCS | Mod: PBBFAC,HCNC,, | Performed by: INTERNAL MEDICINE

## 2021-11-26 PROCEDURE — 3062F POS MACROALBUMINURIA REV: CPT | Mod: HCNC,CPTII,S$GLB, | Performed by: INTERNAL MEDICINE

## 2021-11-26 PROCEDURE — 99999 PR PBB SHADOW E&M-EST. PATIENT-LVL IV: CPT | Mod: PBBFAC,HCNC,, | Performed by: INTERNAL MEDICINE

## 2021-11-26 PROCEDURE — G0179 MD RECERTIFICATION HHA PT: HCPCS | Mod: ,,, | Performed by: FAMILY MEDICINE

## 2021-11-26 PROCEDURE — 99214 PR OFFICE/OUTPT VISIT, EST, LEVL IV, 30-39 MIN: ICD-10-PCS | Mod: HCNC,S$GLB,, | Performed by: INTERNAL MEDICINE

## 2021-11-29 ENCOUNTER — TELEPHONE (OUTPATIENT)
Dept: DIABETES | Facility: CLINIC | Age: 66
End: 2021-11-29
Payer: MEDICARE

## 2021-11-29 ENCOUNTER — OUTPATIENT CASE MANAGEMENT (OUTPATIENT)
Dept: ADMINISTRATIVE | Facility: OTHER | Age: 66
End: 2021-11-29
Payer: MEDICARE

## 2021-11-29 ENCOUNTER — TELEPHONE (OUTPATIENT)
Dept: INTERNAL MEDICINE | Facility: CLINIC | Age: 66
End: 2021-11-29
Payer: MEDICARE

## 2021-11-29 DIAGNOSIS — Z01.811 PRE-OPERATIVE RESPIRATORY EXAMINATION: Primary | ICD-10-CM

## 2021-11-29 DIAGNOSIS — E11.65 UNCONTROLLED TYPE 2 DIABETES MELLITUS WITH HYPERGLYCEMIA: Primary | ICD-10-CM

## 2021-11-29 RX ORDER — LANCETS
1 EACH MISCELLANEOUS 4 TIMES DAILY
Qty: 300 EACH | Refills: 3 | Status: SHIPPED | OUTPATIENT
Start: 2021-11-29 | End: 2022-03-31 | Stop reason: SDUPTHER

## 2021-11-29 RX ORDER — INSULIN PUMP SYRINGE, 3 ML
EACH MISCELLANEOUS
Qty: 1 EACH | Refills: 1 | Status: SHIPPED | OUTPATIENT
Start: 2021-11-29 | End: 2023-07-13 | Stop reason: SDUPTHER

## 2021-11-30 ENCOUNTER — TELEPHONE (OUTPATIENT)
Dept: ORTHOPEDICS | Facility: CLINIC | Age: 66
End: 2021-11-30
Payer: MEDICARE

## 2021-11-30 ENCOUNTER — PATIENT MESSAGE (OUTPATIENT)
Dept: CARDIOLOGY | Facility: CLINIC | Age: 66
End: 2021-11-30
Payer: MEDICARE

## 2021-11-30 RX ORDER — BACLOFEN 10 MG/1
10 TABLET ORAL DAILY
Qty: 90 TABLET | Refills: 0 | Status: CANCELLED | OUTPATIENT
Start: 2021-11-30

## 2021-11-30 RX ORDER — ISOSORBIDE MONONITRATE 60 MG/1
60 TABLET, EXTENDED RELEASE ORAL DAILY
Qty: 90 TABLET | Refills: 1 | Status: CANCELLED | OUTPATIENT
Start: 2021-11-30

## 2021-12-01 ENCOUNTER — CLINICAL SUPPORT (OUTPATIENT)
Dept: PRIMARY CARE CLINIC | Facility: CLINIC | Age: 66
End: 2021-12-01
Payer: MEDICARE

## 2021-12-01 ENCOUNTER — TELEPHONE (OUTPATIENT)
Dept: PRIMARY CARE CLINIC | Facility: CLINIC | Age: 66
End: 2021-12-01

## 2021-12-01 DIAGNOSIS — G89.29 CHRONIC LEFT SHOULDER PAIN: ICD-10-CM

## 2021-12-01 DIAGNOSIS — F33.1 MDD (MAJOR DEPRESSIVE DISORDER), RECURRENT EPISODE, MODERATE: Primary | ICD-10-CM

## 2021-12-01 DIAGNOSIS — F41.1 GAD (GENERALIZED ANXIETY DISORDER): ICD-10-CM

## 2021-12-01 DIAGNOSIS — M75.102 ROTATOR CUFF TEAR ARTHROPATHY OF LEFT SHOULDER: Primary | ICD-10-CM

## 2021-12-01 DIAGNOSIS — G89.29 OTHER CHRONIC PAIN: ICD-10-CM

## 2021-12-01 DIAGNOSIS — M25.512 CHRONIC LEFT SHOULDER PAIN: ICD-10-CM

## 2021-12-01 DIAGNOSIS — M12.812 ROTATOR CUFF TEAR ARTHROPATHY OF LEFT SHOULDER: Primary | ICD-10-CM

## 2021-12-01 PROBLEM — Z01.811 PRE-OPERATIVE RESPIRATORY EXAMINATION: Status: ACTIVE | Noted: 2021-12-01

## 2021-12-01 PROCEDURE — 90834 PSYTX W PT 45 MINUTES: CPT | Mod: BHI,95,, | Performed by: SOCIAL WORKER

## 2021-12-01 PROCEDURE — 90834 PR PSYCHOTHERAPY W/PATIENT, 45 MIN: ICD-10-PCS | Mod: BHI,95,, | Performed by: SOCIAL WORKER

## 2021-12-01 RX ORDER — AMLODIPINE BESYLATE 2.5 MG/1
2.5 TABLET ORAL DAILY
Qty: 30 TABLET | Refills: 9 | Status: SHIPPED | OUTPATIENT
Start: 2021-12-01 | End: 2022-01-31 | Stop reason: SDUPTHER

## 2021-12-02 ENCOUNTER — LAB VISIT (OUTPATIENT)
Dept: LAB | Facility: HOSPITAL | Age: 66
End: 2021-12-02
Attending: INTERNAL MEDICINE
Payer: MEDICARE

## 2021-12-02 ENCOUNTER — SPECIALTY PHARMACY (OUTPATIENT)
Dept: PHARMACY | Facility: CLINIC | Age: 66
End: 2021-12-02
Payer: MEDICARE

## 2021-12-02 ENCOUNTER — CLINICAL SUPPORT (OUTPATIENT)
Dept: DIABETES | Facility: CLINIC | Age: 66
End: 2021-12-02
Payer: MEDICARE

## 2021-12-02 VITALS — BODY MASS INDEX: 31.19 KG/M2 | HEIGHT: 65 IN | WEIGHT: 187.19 LBS

## 2021-12-02 DIAGNOSIS — E11.65 UNCONTROLLED TYPE 2 DIABETES MELLITUS WITH HYPERGLYCEMIA: ICD-10-CM

## 2021-12-02 LAB
ESTIMATED AVG GLUCOSE: 146 MG/DL (ref 68–131)
HBA1C MFR BLD: 6.7 % (ref 4–5.6)

## 2021-12-02 PROCEDURE — G0108 DIAB MANAGE TRN  PER INDIV: HCPCS | Mod: HCNC,S$GLB,, | Performed by: DIETITIAN, REGISTERED

## 2021-12-02 PROCEDURE — 83036 HEMOGLOBIN GLYCOSYLATED A1C: CPT | Mod: HCNC | Performed by: INTERNAL MEDICINE

## 2021-12-02 PROCEDURE — 36415 COLL VENOUS BLD VENIPUNCTURE: CPT | Mod: HCNC | Performed by: INTERNAL MEDICINE

## 2021-12-02 PROCEDURE — 99999 PR PBB SHADOW E&M-EST. PATIENT-LVL III: ICD-10-PCS | Mod: PBBFAC,HCNC,, | Performed by: DIETITIAN, REGISTERED

## 2021-12-02 PROCEDURE — 99999 PR PBB SHADOW E&M-EST. PATIENT-LVL III: CPT | Mod: PBBFAC,HCNC,, | Performed by: DIETITIAN, REGISTERED

## 2021-12-02 PROCEDURE — G0108 PR DIAB MANAGE TRN  PER INDIV: ICD-10-PCS | Mod: HCNC,S$GLB,, | Performed by: DIETITIAN, REGISTERED

## 2021-12-07 ENCOUNTER — PATIENT OUTREACH (OUTPATIENT)
Dept: ADMINISTRATIVE | Facility: OTHER | Age: 66
End: 2021-12-07
Payer: MEDICARE

## 2021-12-08 ENCOUNTER — OFFICE VISIT (OUTPATIENT)
Dept: DIABETES | Facility: CLINIC | Age: 66
End: 2021-12-08
Payer: MEDICARE

## 2021-12-08 ENCOUNTER — OFFICE VISIT (OUTPATIENT)
Dept: PODIATRY | Facility: CLINIC | Age: 66
End: 2021-12-08
Payer: MEDICARE

## 2021-12-08 ENCOUNTER — TELEPHONE (OUTPATIENT)
Dept: INTERNAL MEDICINE | Facility: CLINIC | Age: 66
End: 2021-12-08
Payer: MEDICARE

## 2021-12-08 ENCOUNTER — OUTPATIENT CASE MANAGEMENT (OUTPATIENT)
Dept: ADMINISTRATIVE | Facility: OTHER | Age: 66
End: 2021-12-08
Payer: MEDICARE

## 2021-12-08 VITALS
WEIGHT: 187 LBS | HEART RATE: 81 BPM | BODY MASS INDEX: 31.16 KG/M2 | HEIGHT: 65 IN | DIASTOLIC BLOOD PRESSURE: 71 MMHG | SYSTOLIC BLOOD PRESSURE: 117 MMHG

## 2021-12-08 VITALS
SYSTOLIC BLOOD PRESSURE: 126 MMHG | DIASTOLIC BLOOD PRESSURE: 76 MMHG | HEART RATE: 80 BPM | BODY MASS INDEX: 31.99 KG/M2 | WEIGHT: 192 LBS | HEIGHT: 65 IN

## 2021-12-08 DIAGNOSIS — E11.49 TYPE II DIABETES MELLITUS WITH NEUROLOGICAL MANIFESTATIONS: Primary | ICD-10-CM

## 2021-12-08 DIAGNOSIS — E66.9 OBESITY (BMI 30.0-34.9): ICD-10-CM

## 2021-12-08 DIAGNOSIS — E11.65 UNCONTROLLED TYPE 2 DIABETES MELLITUS WITH HYPERGLYCEMIA: Primary | ICD-10-CM

## 2021-12-08 DIAGNOSIS — E11.69 HYPERLIPIDEMIA ASSOCIATED WITH TYPE 2 DIABETES MELLITUS: ICD-10-CM

## 2021-12-08 DIAGNOSIS — E11.59 HYPERTENSION ASSOCIATED WITH DIABETES: ICD-10-CM

## 2021-12-08 DIAGNOSIS — N52.9 ERECTILE DYSFUNCTION, UNSPECIFIED ERECTILE DYSFUNCTION TYPE: ICD-10-CM

## 2021-12-08 DIAGNOSIS — B35.1 DERMATOPHYTOSIS OF NAIL: ICD-10-CM

## 2021-12-08 DIAGNOSIS — L84 PRE-ULCERATIVE CALLUSES: ICD-10-CM

## 2021-12-08 DIAGNOSIS — I25.118 CORONARY ARTERY DISEASE OF NATIVE ARTERY OF NATIVE HEART WITH STABLE ANGINA PECTORIS: ICD-10-CM

## 2021-12-08 DIAGNOSIS — E78.5 HYPERLIPIDEMIA ASSOCIATED WITH TYPE 2 DIABETES MELLITUS: ICD-10-CM

## 2021-12-08 DIAGNOSIS — E11.3312 MODERATE NONPROLIFERATIVE DIABETIC RETINOPATHY OF LEFT EYE WITH MACULAR EDEMA ASSOCIATED WITH TYPE 2 DIABETES MELLITUS: ICD-10-CM

## 2021-12-08 DIAGNOSIS — I50.32 CHRONIC DIASTOLIC HEART FAILURE: ICD-10-CM

## 2021-12-08 DIAGNOSIS — N18.30 STAGE 3 CHRONIC KIDNEY DISEASE, UNSPECIFIED WHETHER STAGE 3A OR 3B CKD: ICD-10-CM

## 2021-12-08 DIAGNOSIS — E29.1 HYPOGONADISM IN MALE: ICD-10-CM

## 2021-12-08 DIAGNOSIS — E21.5 DISORDER OF PARATHYROID GLAND: ICD-10-CM

## 2021-12-08 DIAGNOSIS — I15.2 HYPERTENSION ASSOCIATED WITH DIABETES: ICD-10-CM

## 2021-12-08 DIAGNOSIS — E11.42 DIABETIC POLYNEUROPATHY ASSOCIATED WITH TYPE 2 DIABETES MELLITUS: ICD-10-CM

## 2021-12-08 LAB — GLUCOSE SERPL-MCNC: 78 MG/DL (ref 70–110)

## 2021-12-08 PROCEDURE — 99499 UNLISTED E&M SERVICE: CPT | Mod: HCNC,S$GLB,, | Performed by: PODIATRIST

## 2021-12-08 PROCEDURE — 11055 PARING/CUTG B9 HYPRKER LES 1: CPT | Mod: Q7,HCNC,S$GLB, | Performed by: PODIATRIST

## 2021-12-08 PROCEDURE — 3074F PR MOST RECENT SYSTOLIC BLOOD PRESSURE < 130 MM HG: ICD-10-PCS | Mod: HCNC,CPTII,S$GLB, | Performed by: PODIATRIST

## 2021-12-08 PROCEDURE — 3008F BODY MASS INDEX DOCD: CPT | Mod: HCNC,CPTII,S$GLB, | Performed by: PODIATRIST

## 2021-12-08 PROCEDURE — 3062F PR POS MACROALBUMINURIA RESULT DOCUMENTED/REVIEW: ICD-10-PCS | Mod: HCNC,CPTII,S$GLB, | Performed by: PODIATRIST

## 2021-12-08 PROCEDURE — 3066F NEPHROPATHY DOC TX: CPT | Mod: HCNC,CPTII,S$GLB, | Performed by: PODIATRIST

## 2021-12-08 PROCEDURE — 3066F NEPHROPATHY DOC TX: CPT | Mod: HCNC,CPTII,S$GLB, | Performed by: PHYSICIAN ASSISTANT

## 2021-12-08 PROCEDURE — 82962 GLUCOSE BLOOD TEST: CPT | Mod: HCNC,S$GLB,, | Performed by: PHYSICIAN ASSISTANT

## 2021-12-08 PROCEDURE — 4010F ACE/ARB THERAPY RXD/TAKEN: CPT | Mod: HCNC,CPTII,S$GLB, | Performed by: PODIATRIST

## 2021-12-08 PROCEDURE — 82962 POCT GLUCOSE, HAND-HELD DEVICE: ICD-10-PCS | Mod: HCNC,S$GLB,, | Performed by: PHYSICIAN ASSISTANT

## 2021-12-08 PROCEDURE — 99499 UNLISTED E&M SERVICE: CPT | Mod: S$PBB,,, | Performed by: PODIATRIST

## 2021-12-08 PROCEDURE — 99999 PR PBB SHADOW E&M-EST. PATIENT-LVL IV: CPT | Mod: PBBFAC,HCNC,, | Performed by: PHYSICIAN ASSISTANT

## 2021-12-08 PROCEDURE — 3008F PR BODY MASS INDEX (BMI) DOCUMENTED: ICD-10-PCS | Mod: HCNC,CPTII,S$GLB, | Performed by: PODIATRIST

## 2021-12-08 PROCEDURE — 99214 OFFICE O/P EST MOD 30 MIN: CPT | Mod: HCNC,S$GLB,, | Performed by: PHYSICIAN ASSISTANT

## 2021-12-08 PROCEDURE — 3062F PR POS MACROALBUMINURIA RESULT DOCUMENTED/REVIEW: ICD-10-PCS | Mod: HCNC,CPTII,S$GLB, | Performed by: PHYSICIAN ASSISTANT

## 2021-12-08 PROCEDURE — 99214 PR OFFICE/OUTPT VISIT, EST, LEVL IV, 30-39 MIN: ICD-10-PCS | Mod: HCNC,S$GLB,, | Performed by: PHYSICIAN ASSISTANT

## 2021-12-08 PROCEDURE — 4010F PR ACE/ARB THEARPY RXD/TAKEN: ICD-10-PCS | Mod: HCNC,CPTII,S$GLB, | Performed by: PODIATRIST

## 2021-12-08 PROCEDURE — 11055 PR TRIM HYPERKERATOTIC SKIN LESION, ONE: ICD-10-PCS | Mod: Q7,HCNC,S$GLB, | Performed by: PODIATRIST

## 2021-12-08 PROCEDURE — 99499 NO LOS: ICD-10-PCS | Mod: HCNC,S$GLB,, | Performed by: PODIATRIST

## 2021-12-08 PROCEDURE — 3066F PR DOCUMENTATION OF TREATMENT FOR NEPHROPATHY: ICD-10-PCS | Mod: HCNC,CPTII,S$GLB, | Performed by: PODIATRIST

## 2021-12-08 PROCEDURE — 3044F HG A1C LEVEL LT 7.0%: CPT | Mod: HCNC,CPTII,S$GLB, | Performed by: PODIATRIST

## 2021-12-08 PROCEDURE — 99999 PR PBB SHADOW E&M-EST. PATIENT-LVL IV: ICD-10-PCS | Mod: PBBFAC,HCNC,, | Performed by: PHYSICIAN ASSISTANT

## 2021-12-08 PROCEDURE — 3078F PR MOST RECENT DIASTOLIC BLOOD PRESSURE < 80 MM HG: ICD-10-PCS | Mod: HCNC,CPTII,S$GLB, | Performed by: PODIATRIST

## 2021-12-08 PROCEDURE — 4010F PR ACE/ARB THEARPY RXD/TAKEN: ICD-10-PCS | Mod: HCNC,CPTII,S$GLB, | Performed by: PHYSICIAN ASSISTANT

## 2021-12-08 PROCEDURE — 3062F POS MACROALBUMINURIA REV: CPT | Mod: HCNC,CPTII,S$GLB, | Performed by: PODIATRIST

## 2021-12-08 PROCEDURE — 4010F ACE/ARB THERAPY RXD/TAKEN: CPT | Mod: HCNC,CPTII,S$GLB, | Performed by: PHYSICIAN ASSISTANT

## 2021-12-08 PROCEDURE — 3044F PR MOST RECENT HEMOGLOBIN A1C LEVEL <7.0%: ICD-10-PCS | Mod: HCNC,CPTII,S$GLB, | Performed by: PODIATRIST

## 2021-12-08 PROCEDURE — 3078F DIAST BP <80 MM HG: CPT | Mod: HCNC,CPTII,S$GLB, | Performed by: PODIATRIST

## 2021-12-08 PROCEDURE — 3066F PR DOCUMENTATION OF TREATMENT FOR NEPHROPATHY: ICD-10-PCS | Mod: HCNC,CPTII,S$GLB, | Performed by: PHYSICIAN ASSISTANT

## 2021-12-08 PROCEDURE — 11721 DEBRIDE NAIL 6 OR MORE: CPT | Mod: Q7,59,HCNC,S$GLB | Performed by: PODIATRIST

## 2021-12-08 PROCEDURE — 99999 PR PBB SHADOW E&M-EST. PATIENT-LVL V: ICD-10-PCS | Mod: PBBFAC,HCNC,, | Performed by: PODIATRIST

## 2021-12-08 PROCEDURE — 3062F POS MACROALBUMINURIA REV: CPT | Mod: HCNC,CPTII,S$GLB, | Performed by: PHYSICIAN ASSISTANT

## 2021-12-08 PROCEDURE — 3074F SYST BP LT 130 MM HG: CPT | Mod: HCNC,CPTII,S$GLB, | Performed by: PODIATRIST

## 2021-12-08 PROCEDURE — 11721 PR DEBRIDEMENT OF NAILS, 6 OR MORE: ICD-10-PCS | Mod: Q7,59,HCNC,S$GLB | Performed by: PODIATRIST

## 2021-12-08 PROCEDURE — 99999 PR PBB SHADOW E&M-EST. PATIENT-LVL V: CPT | Mod: PBBFAC,HCNC,, | Performed by: PODIATRIST

## 2021-12-09 ENCOUNTER — TELEPHONE (OUTPATIENT)
Dept: INTERNAL MEDICINE | Facility: CLINIC | Age: 66
End: 2021-12-09
Payer: MEDICARE

## 2021-12-10 ENCOUNTER — EXTERNAL HOME HEALTH (OUTPATIENT)
Dept: HOME HEALTH SERVICES | Facility: HOSPITAL | Age: 66
End: 2021-12-10
Payer: MEDICARE

## 2021-12-13 ENCOUNTER — TELEPHONE (OUTPATIENT)
Dept: ORTHOPEDICS | Facility: CLINIC | Age: 66
End: 2021-12-13
Payer: MEDICARE

## 2021-12-13 ENCOUNTER — OUTPATIENT CASE MANAGEMENT (OUTPATIENT)
Dept: ADMINISTRATIVE | Facility: OTHER | Age: 66
End: 2021-12-13
Payer: MEDICARE

## 2021-12-13 RX ORDER — EMPAGLIFLOZIN 25 MG/1
TABLET, FILM COATED ORAL
Qty: 90 TABLET | Refills: 0 | OUTPATIENT
Start: 2021-12-13

## 2021-12-14 ENCOUNTER — TELEPHONE (OUTPATIENT)
Dept: INTERNAL MEDICINE | Facility: CLINIC | Age: 66
End: 2021-12-14
Payer: MEDICARE

## 2021-12-14 ENCOUNTER — OUTPATIENT CASE MANAGEMENT (OUTPATIENT)
Dept: ADMINISTRATIVE | Facility: OTHER | Age: 66
End: 2021-12-14
Payer: MEDICARE

## 2021-12-15 ENCOUNTER — CLINICAL SUPPORT (OUTPATIENT)
Dept: DIABETES | Facility: CLINIC | Age: 66
End: 2021-12-15
Payer: MEDICARE

## 2021-12-15 VITALS — WEIGHT: 190.06 LBS | BODY MASS INDEX: 31.67 KG/M2 | HEIGHT: 65 IN

## 2021-12-15 DIAGNOSIS — E11.65 UNCONTROLLED TYPE 2 DIABETES MELLITUS WITH HYPERGLYCEMIA: ICD-10-CM

## 2021-12-15 DIAGNOSIS — E11.65 UNCONTROLLED TYPE 2 DIABETES MELLITUS WITH HYPERGLYCEMIA: Primary | ICD-10-CM

## 2021-12-15 PROCEDURE — 99999 PR PBB SHADOW E&M-EST. PATIENT-LVL II: CPT | Mod: PBBFAC,HCNC,, | Performed by: DIETITIAN, REGISTERED

## 2021-12-15 PROCEDURE — G0108 DIAB MANAGE TRN  PER INDIV: HCPCS | Mod: HCNC,S$GLB,, | Performed by: DIETITIAN, REGISTERED

## 2021-12-15 PROCEDURE — G0108 PR DIAB MANAGE TRN  PER INDIV: ICD-10-PCS | Mod: HCNC,S$GLB,, | Performed by: DIETITIAN, REGISTERED

## 2021-12-15 PROCEDURE — 99999 PR PBB SHADOW E&M-EST. PATIENT-LVL II: ICD-10-PCS | Mod: PBBFAC,HCNC,, | Performed by: DIETITIAN, REGISTERED

## 2021-12-17 ENCOUNTER — TELEPHONE (OUTPATIENT)
Dept: INTERNAL MEDICINE | Facility: CLINIC | Age: 66
End: 2021-12-17
Payer: MEDICARE

## 2021-12-20 ENCOUNTER — OUTPATIENT CASE MANAGEMENT (OUTPATIENT)
Dept: ADMINISTRATIVE | Facility: OTHER | Age: 66
End: 2021-12-20
Payer: MEDICARE

## 2021-12-22 ENCOUNTER — OUTPATIENT CASE MANAGEMENT (OUTPATIENT)
Dept: ADMINISTRATIVE | Facility: OTHER | Age: 66
End: 2021-12-22
Payer: MEDICARE

## 2021-12-23 ENCOUNTER — ANESTHESIA EVENT (OUTPATIENT)
Dept: SURGERY | Facility: HOSPITAL | Age: 66
End: 2021-12-23
Payer: MEDICARE

## 2021-12-25 NOTE — PROGRESS NOTES
Subjective:       Patient ID: Crow Green is a 62 y.o. male.    Chief Complaint: Follow-up and Hip Pain (left)    HPI  Here today to follow-up from recent emergency room visit where he was seen 2 days ago.  He was brought in via EMS and diagnosed with acute sciatica.  He did not and has not had any incontinence issues at all.  Describes intense pain radiating from his left buttocks region down into his left foot.  He was given Toradol injection in the emergency room and was also given 12 tablets of hydrocodone.  He takes gabapentin chronically for back pain.  He has not been taking any other oral anti-inflammatory.  He does say that the hydrocodone helped him.  He has never really had problems with his sciatic nerve in the past.    Family History   Problem Relation Age of Onset    Glaucoma Mother     Cataracts Mother     Cataracts Father     Glaucoma Sister     Cataracts Sister     Glaucoma Maternal Aunt     Prostate cancer Neg Hx        Current Outpatient Prescriptions:     ALCOHOL ANTISEPTIC PADS (ALCOHOL PREP PADS TOP), , Disp: , Rfl:     amiodarone (PACERONE) 200 MG Tab, Take 1 tablet (200 mg total) by mouth once daily., Disp: 30 tablet, Rfl: 11    amlodipine (NORVASC) 10 MG tablet, Take 1 tablet (10 mg total) by mouth once daily., Disp: 90 tablet, Rfl: 3    ammonium lactate 12 % Crea, Apply 1 Act topically 2 (two) times daily. (Patient taking differently: Apply 1 Act topically once daily. ), Disp: 1 Tube, Rfl: 3    apixaban 5 mg Tab, Take 1 tablet (5 mg total) by mouth 2 (two) times daily., Disp: 60 tablet, Rfl: 1    doxepin (SINEQUAN) 150 MG Cap, , Disp: , Rfl:     duloxetine (CYMBALTA) 30 MG capsule, , Disp: , Rfl:     exenatide microspheres (BYDUREON BCISE) 2 mg/0.85 mL AtIn, Inject 2 mg into the skin every 7 days., Disp: 4 Syringe, Rfl: 11    fluticasone-salmeterol 250-50 mcg/dose (ADVAIR DISKUS) 250-50 mcg/dose diskus inhaler, Inhale 1 puff into the lungs 2 (two) times daily.  "Controller, Disp: 60 each, Rfl: 11    gabapentin (NEURONTIN) 300 MG capsule, Take 1 capsule (300 mg total) by mouth 3 (three) times daily., Disp: 270 capsule, Rfl: 3    glimepiride (AMARYL) 2 MG tablet, TAKE 1 TABLET BY MOUTH DAILY BEFORE BREAKFAST, Disp: 90 tablet, Rfl: 0    insulin glargine (LANTUS SOLOSTAR) 100 unit/mL (3 mL) InPn pen, Inject 50 Units into the skin every evening. (Patient taking differently: Inject 60 Units into the skin every evening. ), Disp: 2 Box, Rfl: 11    insulin syringe-needle U-100 1 mL 31 gauge x 5/16 Syrg, , Disp: , Rfl:     latanoprost 0.005 % ophthalmic solution, Place 1 drop into both eyes every evening., Disp: 1 Bottle, Rfl: 4    lisinopril (PRINIVIL,ZESTRIL) 5 MG tablet, TAKE 1 TABLET (5 MG TOTAL) BY MOUTH ONCE DAILY., Disp: 90 tablet, Rfl: 2    metformin (GLUCOPHAGE) 1000 MG tablet, Take 1 tablet (1,000 mg total) by mouth 2 (two) times daily with meals., Disp: 180 tablet, Rfl: 3    metoprolol succinate (TOPROL-XL) 50 MG 24 hr tablet, TAKE 1 TABLET EVERY DAY, Disp: 90 tablet, Rfl: 5    naproxen (NAPROSYN) 500 MG tablet, Take 1 tablet (500 mg total) by mouth 2 (two) times daily., Disp: 14 tablet, Rfl: 0    NOVOLOG FLEXPEN 100 unit/mL InPn pen, ADMINISTER 20 UNITS UNDER THE SKIN THREE TIMES DAILY WITH MEALS, Disp: 15 mL, Rfl: 0    pantoprazole (PROTONIX) 40 MG tablet, Take 1 tablet (40 mg total) by mouth once daily., Disp: 30 tablet, Rfl: 11    pen needle, diabetic (BD INSULIN PEN NEEDLE UF SHORT) 31 gauge x 5/16" Ndle, Inject 1 each into the skin 3 (three) times daily., Disp: 90 each, Rfl: 11    trazodone (DESYREL) 150 MG tablet, , Disp: , Rfl:     TRUE METRIX AIR GLUCOSE METER kit, TEST FOUR TIMES DAILY BEFORE MEALS  AND EVERY NIGHT, Disp: 1 each, Rfl: 0    VENTOLIN HFA 90 mcg/actuation inhaler, , Disp: , Rfl:     aspirin (ECOTRIN) 81 MG EC tablet, Take 1 tablet (81 mg total) by mouth once daily., Disp: , Rfl: 0    atorvastatin (LIPITOR) 40 MG tablet, Take 1 " "tablet (40 mg total) by mouth every evening., Disp: 90 tablet, Rfl: 3    nitroGLYCERIN (NITROSTAT) 0.3 MG SL tablet, Place 1 tablet (0.3 mg total) under the tongue every 5 (five) minutes as needed for Chest pain. No more than 3 tablets in one day., Disp: 30 tablet, Rfl: 1    tiotropium (SPIRIVA WITH HANDIHALER) 18 mcg inhalation capsule, Inhale 1 capsule (18 mcg total) into the lungs once daily. Controller, Disp: 90 capsule, Rfl: 3  No current facility-administered medications for this visit.     Review of Systems   Constitutional: Negative for chills and fever.   Eyes: Negative for visual disturbance.   Respiratory: Negative for cough and shortness of breath.    Cardiovascular: Negative for chest pain.   Gastrointestinal: Negative for abdominal pain.   Musculoskeletal: Positive for back pain and gait problem.   Neurological: Negative for dizziness.       Objective:   /70   Pulse 71   Temp 98.5 °F (36.9 °C) (Tympanic)   Ht 5' 5" (1.651 m)   Wt 98 kg (216 lb)   SpO2 98%   BMI 35.94 kg/m²      Physical Exam   Constitutional: He is oriented to person, place, and time. He appears well-developed and well-nourished. No distress.   HENT:   Head: Normocephalic and atraumatic.   Nose: Nose normal.   Eyes: Conjunctivae and EOM are normal. Pupils are equal, round, and reactive to light. Right eye exhibits no discharge. Left eye exhibits no discharge.   Neck: No thyromegaly present.   Cardiovascular: Normal rate, regular rhythm and normal heart sounds.    No murmur heard.  Pulmonary/Chest: Effort normal and breath sounds normal. No respiratory distress. He has no wheezes.   Abdominal: Soft. He exhibits no distension.   Musculoskeletal: He exhibits no edema.   Patient had a positive straight leg raise test on the left side.  Was in obvious discomfort although he was walking with only a slight antalgic gait after evaluation.   Neurological: He is alert and oriented to person, place, and time.   Skin: Skin is warm. " No rash noted. He is not diaphoretic.   Psychiatric: He has a normal mood and affect. His behavior is normal.   Vitals reviewed.      Assessment & Plan     Problem List Items Addressed This Visit        Orthopedic    Left sided sciatica - Primary    Current Assessment & Plan     Recommended using the heating pad, physical therapy referral, intramuscular Toradol injection and starting on naproxen in 2 days.  Told him that no opioid medication would be warranted         Relevant Medications    ketorolac injection 30 mg (Completed)    Other Relevant Orders    Ambulatory consult to Physical Therapy            No Follow-up on file.   18

## 2021-12-27 ENCOUNTER — TELEPHONE (OUTPATIENT)
Dept: DIABETES | Facility: CLINIC | Age: 66
End: 2021-12-27
Payer: MEDICARE

## 2021-12-27 DIAGNOSIS — Z01.818 PRE-OP TESTING: Primary | ICD-10-CM

## 2021-12-27 NOTE — PRE ADMISSION SCREENING
Pre op instructions reviewed with pt per phone: Spoke about pre op process and surgery instructions, verbalized understanding.    To confirm, Your surgeon has instructed you:  Surgery is scheduled on 1/3/22      Please report to the main hospital (1st Floor) at [facility] off of [facility] (2nd building on the left, in front of the flag pole).  Please arrive at 1030am. We will call you the afternoon prior to surgery to confirm arrival time, as it is subject to change due to cancellations & emergencies.        INSTRUCTIONS IMPORTANT!!!  Do Not Eat, Drink, or Smoke after 12 midnight! NO WATER after midnight! OK to brush teeth, no gum, candy or mints!      *Take only these medicines with a small swallow of water-morning of surgery.  Albuterol, gabapentin, imdur, symbicort, spiriva      ____  Do Not wear makeup, mascara or nail polish.   ____  NO Acrylic/fake nails worn day of surgery (hand/arm surgeries)  ____  NO powder, lotions, deodorants or creams to surgical area.  ____  Please remove all jewelry, including piercings and leave at home.  ____  Dentures, Hearing Aids and Contact Lens will need to be removed prior to the start of surgery.  ____  Please bring photo ID and insurance information to hospital (Leave Valuables at Home)  ____  If going home the same day, arrange for a ride home. You will not be able to             drive if Anesthesia was used.   ____  Wear clean, loose fitting clothing. Allow for dressings, bandages.  ____  Stop Aspirin, Ibuprofen, Motrin and Aleve at least 5-7 days before surgery, unless otherwise instructed by your doctor, or the nurse. You MAY use Tylenol/acetaminophen until day of                surgery.  ____  If you take diabetic medication, do NOT take morning of surgery unless instructed by             Doctor. Metformin to be stopped 24 hrs prior to surgery time. DO NOT take long-acting insulin the evening before surgery. Blood sugars will be checked in pre-op morning of.  ____ Stop  taking any Fish Oil supplements or Vitamins at least 5 days prior to surgery, unless instructed otherwise by your Doctor.          Bathing Instructions: The night before surgery and the morning prior to coming to the hospital:   -Do not shave your face.  -Do not shave pubic hair 7 days prior to surgery (gyn pt's).  -Do not shave legs/underarms 3 days prior to surgery.   -Shower & Rinse your body as usual with anti-bacterial Soap (Dial, Lever 2000, or Hibiclens)   -Do not use hibiclens on your head, face, or genitals.   -Do not wash with anti-bacterial soap after you use the hibiclens.   -Rinse your body thoroughly.      [facility] Visitor/Ride Policy:  Only 1 adult allowed (over the age of 18) to accompany you into Pre-op/Recovery Surgery Dept. All other visitors will need to wait in waiting room. Must have a ride home from a responsible adult that you know and trust. Medical Transport, Uber or Lyft can only be used if patient has a responsible adult to accompany them during ride home.      Surgical Site Infection:    Prevention of surgical site infections:     -Keep incisions clean and dry.   -Do not soak/submerge incisions in water until completely healed.   -Do not apply lotions, powders, creams, or deodorants to site.   -Always make sure hands are cleaned with antibacterial soap/ alcohol-based   prior to touching the surgical site.      Signs and symptoms:   -Redness and pain around the area where you had surgery   -Drainage of cloudy fluid from your surgical wound   -Fever over 100.4 or chills  >>>Call Surgeon office/on-call Surgeon if you experience any of these signs & symptoms post-surgery.      *Please Call [facility] Department with surgery instruction questions at [phone] or [phone].    *Insurance Questions, please call [phone].

## 2021-12-28 ENCOUNTER — TELEPHONE (OUTPATIENT)
Dept: INTERNAL MEDICINE | Facility: CLINIC | Age: 66
End: 2021-12-28
Payer: MEDICARE

## 2021-12-29 ENCOUNTER — TELEPHONE (OUTPATIENT)
Dept: PRIMARY CARE CLINIC | Facility: CLINIC | Age: 66
End: 2021-12-29
Payer: MEDICARE

## 2021-12-29 ENCOUNTER — TELEPHONE (OUTPATIENT)
Dept: DIABETES | Facility: CLINIC | Age: 66
End: 2021-12-29
Payer: MEDICARE

## 2021-12-29 ENCOUNTER — TELEPHONE (OUTPATIENT)
Dept: INTERNAL MEDICINE | Facility: CLINIC | Age: 66
End: 2021-12-29
Payer: MEDICARE

## 2021-12-29 DIAGNOSIS — Z01.818 PRE-OP TESTING: ICD-10-CM

## 2021-12-29 DIAGNOSIS — Z96.612 S/P REVERSE TOTAL SHOULDER ARTHROPLASTY, LEFT: Primary | ICD-10-CM

## 2021-12-29 NOTE — PRE ADMISSION SCREENING
Called and spoke with Abi in Dr Alvarado office informing her of pt needing a ride home or someone to escort him in the medical transport van if pt does not go to Rehab. Abi to call pt now and follow up about ride.

## 2021-12-29 NOTE — PRE ADMISSION SCREENING
Called and spoke with dr cervantes regarding pt history, ECHO and EKG, cardiac clearance was received in November, no new orders.

## 2021-12-30 ENCOUNTER — TELEPHONE (OUTPATIENT)
Dept: PRIMARY CARE CLINIC | Facility: CLINIC | Age: 66
End: 2021-12-30
Payer: MEDICARE

## 2021-12-30 ENCOUNTER — TELEPHONE (OUTPATIENT)
Dept: DIABETES | Facility: CLINIC | Age: 66
End: 2021-12-30
Payer: MEDICARE

## 2021-12-30 ENCOUNTER — LAB VISIT (OUTPATIENT)
Dept: LAB | Facility: HOSPITAL | Age: 66
End: 2021-12-30
Attending: ANESTHESIOLOGY
Payer: MEDICARE

## 2021-12-30 DIAGNOSIS — Z01.818 PRE-OP TESTING: ICD-10-CM

## 2021-12-30 LAB
ALBUMIN SERPL BCP-MCNC: 3.2 G/DL (ref 3.5–5.2)
ALP SERPL-CCNC: 86 U/L (ref 55–135)
ALT SERPL W/O P-5'-P-CCNC: 22 U/L (ref 10–44)
ANION GAP SERPL CALC-SCNC: 12 MMOL/L (ref 8–16)
AST SERPL-CCNC: 22 U/L (ref 10–40)
BASOPHILS # BLD AUTO: 0.02 K/UL (ref 0–0.2)
BASOPHILS NFR BLD: 0.2 % (ref 0–1.9)
BILIRUB SERPL-MCNC: 0.2 MG/DL (ref 0.1–1)
BUN SERPL-MCNC: 15 MG/DL (ref 8–23)
CALCIUM SERPL-MCNC: 9.2 MG/DL (ref 8.7–10.5)
CHLORIDE SERPL-SCNC: 105 MMOL/L (ref 95–110)
CO2 SERPL-SCNC: 25 MMOL/L (ref 23–29)
CREAT SERPL-MCNC: 1.6 MG/DL (ref 0.5–1.4)
DIFFERENTIAL METHOD: ABNORMAL
EOSINOPHIL # BLD AUTO: 0.2 K/UL (ref 0–0.5)
EOSINOPHIL NFR BLD: 2.6 % (ref 0–8)
ERYTHROCYTE [DISTWIDTH] IN BLOOD BY AUTOMATED COUNT: 17.5 % (ref 11.5–14.5)
EST. GFR  (AFRICAN AMERICAN): 52 ML/MIN/1.73 M^2
EST. GFR  (NON AFRICAN AMERICAN): 45 ML/MIN/1.73 M^2
GLUCOSE SERPL-MCNC: 125 MG/DL (ref 70–110)
HCT VFR BLD AUTO: 33.8 % (ref 40–54)
HGB BLD-MCNC: 10 G/DL (ref 14–18)
IMM GRANULOCYTES # BLD AUTO: 0.02 K/UL (ref 0–0.04)
IMM GRANULOCYTES NFR BLD AUTO: 0.2 % (ref 0–0.5)
LYMPHOCYTES # BLD AUTO: 2.2 K/UL (ref 1–4.8)
LYMPHOCYTES NFR BLD: 27.5 % (ref 18–48)
MCH RBC QN AUTO: 23.8 PG (ref 27–31)
MCHC RBC AUTO-ENTMCNC: 29.6 G/DL (ref 32–36)
MCV RBC AUTO: 81 FL (ref 82–98)
MONOCYTES # BLD AUTO: 0.6 K/UL (ref 0.3–1)
MONOCYTES NFR BLD: 7.6 % (ref 4–15)
NEUTROPHILS # BLD AUTO: 5 K/UL (ref 1.8–7.7)
NEUTROPHILS NFR BLD: 61.9 % (ref 38–73)
NRBC BLD-RTO: 0 /100 WBC
PLATELET # BLD AUTO: 327 K/UL (ref 150–450)
PMV BLD AUTO: 9.3 FL (ref 9.2–12.9)
POTASSIUM SERPL-SCNC: 4.1 MMOL/L (ref 3.5–5.1)
PROT SERPL-MCNC: 7.7 G/DL (ref 6–8.4)
RBC # BLD AUTO: 4.2 M/UL (ref 4.6–6.2)
SODIUM SERPL-SCNC: 142 MMOL/L (ref 136–145)
WBC # BLD AUTO: 8.04 K/UL (ref 3.9–12.7)

## 2021-12-30 PROCEDURE — 36415 COLL VENOUS BLD VENIPUNCTURE: CPT | Mod: HCNC | Performed by: ANESTHESIOLOGY

## 2021-12-30 PROCEDURE — 85025 COMPLETE CBC W/AUTO DIFF WBC: CPT | Mod: HCNC | Performed by: ANESTHESIOLOGY

## 2021-12-30 PROCEDURE — 80053 COMPREHEN METABOLIC PANEL: CPT | Mod: HCNC | Performed by: ANESTHESIOLOGY

## 2021-12-31 ENCOUNTER — TELEPHONE (OUTPATIENT)
Dept: ORTHOPEDICS | Facility: CLINIC | Age: 66
End: 2021-12-31
Payer: MEDICARE

## 2022-01-03 ENCOUNTER — ANESTHESIA (OUTPATIENT)
Dept: SURGERY | Facility: HOSPITAL | Age: 67
End: 2022-01-03
Payer: MEDICARE

## 2022-01-03 ENCOUNTER — LAB VISIT (OUTPATIENT)
Dept: PRIMARY CARE CLINIC | Facility: CLINIC | Age: 67
End: 2022-01-03
Payer: MEDICARE

## 2022-01-03 ENCOUNTER — OUTPATIENT CASE MANAGEMENT (OUTPATIENT)
Dept: ADMINISTRATIVE | Facility: OTHER | Age: 67
End: 2022-01-03
Payer: MEDICARE

## 2022-01-03 DIAGNOSIS — M33.13 DERMATOMYOSITIS: ICD-10-CM

## 2022-01-03 DIAGNOSIS — Z20.822 ENCOUNTER FOR LABORATORY TESTING FOR COVID-19 VIRUS: ICD-10-CM

## 2022-01-03 PROCEDURE — U0003 INFECTIOUS AGENT DETECTION BY NUCLEIC ACID (DNA OR RNA); SEVERE ACUTE RESPIRATORY SYNDROME CORONAVIRUS 2 (SARS-COV-2) (CORONAVIRUS DISEASE [COVID-19]), AMPLIFIED PROBE TECHNIQUE, MAKING USE OF HIGH THROUGHPUT TECHNOLOGIES AS DESCRIBED BY CMS-2020-01-R: HCPCS | Mod: HCNC | Performed by: INTERNAL MEDICINE

## 2022-01-03 RX ORDER — AZATHIOPRINE 50 MG/1
50 TABLET ORAL DAILY
Qty: 90 TABLET | Refills: 0 | OUTPATIENT
Start: 2022-01-03 | End: 2022-02-15 | Stop reason: SDUPTHER

## 2022-01-03 NOTE — PROGRESS NOTES
Outpatient Care Management   - Care Plan Follow Up    Patient: Crow Green  MRN:  [id number]  Date of Service:  1/3/2022  Completed by:  Amanda Whipple LCSW  Referral Date: 09/16/2021  Program: Case Management (High Risk)    Reason for Visit   Patient presents with    Update Plan Of Care     01/03/22       Brief Summary: Phoned patient. Provided support, patient has re-scheduled transportation for new appointment time, used today's transportation to get COVID screening. Discussed lack of support for recovery. Discussed request for SNF for after surgery. Encouraged patient to call if he or his providers have questions tomorrow at his MD appointment. Patient agreed and entered [facility] new contact information into his phone.     Complex Care Plan     Care plan was discussed and completed today with input from patient and/or caregiver.    Patient Instructions     Instructions were provided via the [employer] patient resources and are available for the patient to view on the patient portal.    Follow up in about 1 week (around 1/10/2022) for Assist with post hospital plans after surgery..    Todays OPCM Self-Management Care Plan was developed with the patients/caregivers input and was based on identified barriers from todays assessment.  Goals were written today with the patient/caregiver and the patient has agreed to work towards these goals to improve his/her overall well-being. Patient verbalized understanding of the care plan, goals, and all of today's instructions. Encouraged patient/caregiver to communicate with his/her physician and health care team about health conditions and the treatment plan.  Provided my contact information today and encouraged patient/caregiver to call me with any questions as needed.2

## 2022-01-04 ENCOUNTER — TELEPHONE (OUTPATIENT)
Dept: ORTHOPEDICS | Facility: CLINIC | Age: 67
End: 2022-01-04
Payer: MEDICARE

## 2022-01-04 ENCOUNTER — TELEPHONE (OUTPATIENT)
Dept: INTERNAL MEDICINE | Facility: CLINIC | Age: 67
End: 2022-01-04
Payer: MEDICARE

## 2022-01-04 ENCOUNTER — OFFICE VISIT (OUTPATIENT)
Dept: ORTHOPEDICS | Facility: CLINIC | Age: 67
End: 2022-01-04
Payer: MEDICARE

## 2022-01-04 ENCOUNTER — PATIENT MESSAGE (OUTPATIENT)
Dept: PHARMACY | Facility: CLINIC | Age: 67
End: 2022-01-04
Payer: MEDICARE

## 2022-01-04 VITALS — WEIGHT: 190 LBS | BODY MASS INDEX: 31.65 KG/M2 | HEIGHT: 65 IN

## 2022-01-04 DIAGNOSIS — M75.102 ROTATOR CUFF TEAR ARTHROPATHY OF LEFT SHOULDER: Primary | ICD-10-CM

## 2022-01-04 DIAGNOSIS — M12.812 ROTATOR CUFF TEAR ARTHROPATHY OF LEFT SHOULDER: Primary | ICD-10-CM

## 2022-01-04 DIAGNOSIS — Z01.818 PREOP TESTING: Primary | ICD-10-CM

## 2022-01-04 DIAGNOSIS — M33.13 DERMATOMYOSITIS: ICD-10-CM

## 2022-01-04 PROCEDURE — 1160F RVW MEDS BY RX/DR IN RCRD: CPT | Mod: HCNC,CPTII,S$GLB, | Performed by: STUDENT IN AN ORGANIZED HEALTH CARE EDUCATION/TRAINING PROGRAM

## 2022-01-04 PROCEDURE — 99214 OFFICE O/P EST MOD 30 MIN: CPT | Mod: HCNC,S$GLB,, | Performed by: STUDENT IN AN ORGANIZED HEALTH CARE EDUCATION/TRAINING PROGRAM

## 2022-01-04 PROCEDURE — 1159F MED LIST DOCD IN RCRD: CPT | Mod: HCNC,CPTII,S$GLB, | Performed by: STUDENT IN AN ORGANIZED HEALTH CARE EDUCATION/TRAINING PROGRAM

## 2022-01-04 PROCEDURE — 99999 PR PBB SHADOW E&M-EST. PATIENT-LVL IV: ICD-10-PCS | Mod: PBBFAC,HCNC,, | Performed by: STUDENT IN AN ORGANIZED HEALTH CARE EDUCATION/TRAINING PROGRAM

## 2022-01-04 PROCEDURE — 99999 PR PBB SHADOW E&M-EST. PATIENT-LVL IV: CPT | Mod: PBBFAC,HCNC,, | Performed by: STUDENT IN AN ORGANIZED HEALTH CARE EDUCATION/TRAINING PROGRAM

## 2022-01-04 PROCEDURE — 1159F PR MEDICATION LIST DOCUMENTED IN MEDICAL RECORD: ICD-10-PCS | Mod: HCNC,CPTII,S$GLB, | Performed by: STUDENT IN AN ORGANIZED HEALTH CARE EDUCATION/TRAINING PROGRAM

## 2022-01-04 PROCEDURE — 1160F PR REVIEW ALL MEDS BY PRESCRIBER/CLIN PHARMACIST DOCUMENTED: ICD-10-PCS | Mod: HCNC,CPTII,S$GLB, | Performed by: STUDENT IN AN ORGANIZED HEALTH CARE EDUCATION/TRAINING PROGRAM

## 2022-01-04 PROCEDURE — 3008F BODY MASS INDEX DOCD: CPT | Mod: HCNC,CPTII,S$GLB, | Performed by: STUDENT IN AN ORGANIZED HEALTH CARE EDUCATION/TRAINING PROGRAM

## 2022-01-04 PROCEDURE — 99214 PR OFFICE/OUTPT VISIT, EST, LEVL IV, 30-39 MIN: ICD-10-PCS | Mod: HCNC,S$GLB,, | Performed by: STUDENT IN AN ORGANIZED HEALTH CARE EDUCATION/TRAINING PROGRAM

## 2022-01-04 PROCEDURE — 3008F PR BODY MASS INDEX (BMI) DOCUMENTED: ICD-10-PCS | Mod: HCNC,CPTII,S$GLB, | Performed by: STUDENT IN AN ORGANIZED HEALTH CARE EDUCATION/TRAINING PROGRAM

## 2022-01-04 RX ORDER — AZATHIOPRINE 50 MG/1
50 TABLET ORAL DAILY
Qty: 90 TABLET | Refills: 0 | Status: CANCELLED | OUTPATIENT
Start: 2022-01-04

## 2022-01-04 NOTE — TELEPHONE ENCOUNTER
----- Message from Alyssa Montano PharmD sent at 1/4/2022 12:23 PM CST -----  Regarding: Azathioprine refill  Good afternoon,    The refill for Mr. Green's azathioprine was sent to print. Do you mind sending another prescription electronically to [facility]?    Thanks,  Alyssa Montano, PharmD  [facility]   Phone: [phone]

## 2022-01-04 NOTE — PROGRESS NOTES
Orthopaedic Follow-Up Visit    Last Appointment: 11/15/2021  Diagnosis: Rotator cuff tear arthropathy of left shoulder  Prior Procedure: none    Crow Green is a 66 y.o. male who is here for f/u evaluation of the left shoulder. The patient was last seen here by me on 10/15/2021 at which point we decided to proceed with reverse total shoulder arthroplasty.  We sent him for cardiac clearance prior to undergoing surgery.  He was cleared by Cardiology, prior to his upcoming surgery there has been some concern about his postoperative plans.  He reports that he does not have any family that can stay with him and he is concerned about where he will go after surgery.     To review his history, Crow Green is a 65 y.o. Right-hand dominant male who presents with LEFT shoulder pain and dysfunction that developed over 1 year ago.  He does not recall any specific injury but has had gradual onset of pain and limited motion in that shoulder.  This has been progressive over time.  Pain is constantly aching with intermittent shooting quality.  Pain is worse with movement of the shoulder, lifting, reaching, and he also endorses night pain.  He has tried multiple nonoperative treatments including rest, activity modification, NSAIDs, physical therapy, and corticosteroid injection.    Patient's medications, allergies, past medical, surgical, social and family histories were reviewed and updated as appropriate.    Review of Systems   All systems reviewed were negative.  Specifically, the patient denies fever, chills, weight loss, chest pain, shortness of breath, or dyspnea on exertion.      Past Medical History:   Diagnosis Date    Arthritis     Asthma     Atrial fibrillation     Atrial fibrillation with RVR 8/7/2019    CHF (congestive heart failure)     Chronic obstructive pulmonary disease 8/5/2020    Depression     Dermatomyositis     Diabetes mellitus, type 2 Diagnosed in 2000    Elevated PSA     Follow  up 7/30/2020    Hypertension     Myocardial infarction     2017    Sleep apnea        Past Surgical History:   Procedure Laterality Date    ABLATION OF ARRHYTHMOGENIC FOCUS FOR ATRIAL FIBRILLATION N/A 2/27/2020    Procedure: Ablation atrial fibrillation;  Surgeon: Gio Brown MD;  Location: [facility] EP LAB;  Service: Cardiology;  Laterality: N/A;  afib, DAMIEN (cx if SR), PVI, PITO, anes, MB, 3 Prep    CHOLECYSTECTOMY      CLOSURE OF WOUND Left 7/1/2020    Procedure: CLOSURE, WOUND;  Surgeon: Barb Veras DPM;  Location: [facility] OR;  Service: Podiatry;  Laterality: Left;    INJECTION OF ANESTHETIC AGENT INTO SACROILIAC JOINT Left 3/24/2021    Procedure: Left BLOCK, SACROILIAC JOINT and bilateral rhomboid TPI;  Surgeon: Dillan Tsai MD;  Location: [facility] PAIN MGT;  Service: Pain Management;  Laterality: Left;    SELECTIVE INJECTION OF ANESTHETIC AGENT AROUND LUMBAR SPINAL NERVE ROOT BY TRANSFORAMINAL APPROACH Left 6/11/2020    Procedure: BLOCK, SPINAL NERVE ROOT, LUMBAR, SELECTIVE, TRANSFORAMINAL APPROACH Left L4-5, L5-S1 TFESI with RN IV sedation;  Surgeon: Dillan Tsai MD;  Location: [facility] PAIN MGT;  Service: Pain Management;  Laterality: Left;    TOE AMPUTATION Left 6/29/2020    Procedure: AMPUTATION, TOE;  Surgeon: Barb Veras DPM;  Location: [facility];  Service: Podiatry;  Laterality: Left;  great toe       Patient's Medications   New Prescriptions    No medications on file   Previous Medications    ALBUTEROL (PROVENTIL) 2.5 MG /3 ML (0.083 %) NEBULIZER SOLUTION    Take 3 mLs (2.5 mg total) by nebulization every 4 to 6 hours as needed for Wheezing or Shortness of Breath.    ALLOPURINOL (ZYLOPRIM) 100 MG TABLET    Take 1 tablet (100 mg total) by mouth once daily.    AMLODIPINE (NORVASC) 2.5 MG TABLET    Take 1 tablet (2.5 mg total) by mouth once daily.    ASPIRIN (ECOTRIN) 81 MG EC TABLET    Take 1 tablet (81 mg total) by mouth once daily.    ATORVASTATIN (LIPITOR) 40 MG TABLET    Take 1 tablet (40 mg  total) by mouth every evening.    AZATHIOPRINE (IMURAN) 50 MG TAB    Take 1 tablet (50 mg total) by mouth once daily.    AZITHROMYCIN (ZITHROMAX Z-HOLDEN) 250 MG TABLET    500 mg on day 1 (two tablets) followed by 250 mg once daily on days 2-5    BACLOFEN (LIORESAL) 10 MG TABLET    TAKE 1 TABLET EVERY DAY    BLOOD SUGAR DIAGNOSTIC STRP    1 each by Misc.(Non-Drug; Combo Route) route 4 (four) times daily.    BLOOD-GLUCOSE METER (TRUE METRIX GLUCOSE METER) KIT    Use as instructed to test blood glucose meter daily.    COLCHICINE (COLCRYS) 0.6 MG TABLET    TAKE 1 TABLET (0.6 MG TOTAL) BY MOUTH ONCE DAILY.    DEPO-MEDROL 40 MG/ML INJECTION        DICLOFENAC SODIUM (VOLTAREN) 1 % GEL    APPLY 2 GRAMS TOPICALLY FOUR TIMES DAILY    ELIQUIS 5 MG TAB    TAKE 1 TABLET TWICE DAILY    EMPAGLIFLOZIN (JARDIANCE) 25 MG TABLET    Take 1 tablet (25 mg total) by mouth once daily.    FAMOTIDINE (PEPCID) 20 MG TABLET    Take 1 tablet (20 mg total) by mouth 2 (two) times daily.    FENTANYL (SUBLIMAZE) 50 MCG/ML INJECTION        FLUTICASONE-SALMETEROL DISKUS INHALER 250-50 MCG    Inhale 1 puff into the lungs 2 (two) times daily. Controller    FOOD SUPPLEMT, LACTOSE-REDUCED (ENSURE) LIQD    Take 118 mLs by mouth 3 (three) times daily with meals.    FUROSEMIDE (LASIX) 40 MG TABLET    Take 1 tablet (40 mg total) by mouth once daily. TAKE 2 TABLETS EVERY MORNING  AND TAKE 1 TABLET EVERY EVENING    GABAPENTIN (NEURONTIN) 800 MG TABLET    Take 1 tablet (800 mg total) by mouth 3 (three) times daily.    HEPARIN SODIUM,PORCINE/D5W (HEPARIN, PORCINE, IN 5 % DEX) 25,000 UNIT/250 ML(100 UNIT/ML) INFUSION        INSULIN ASPART U-100 (NOVOLOG FLEXPEN U-100 INSULIN) 100 UNIT/ML (3 ML) INPN PEN    INJECT 10 UNITS SUBCUTANEOUSLY THREE TIMES DAILY WITH MEALS    INSULIN DEGLUDEC (TRESIBA FLEXTOUCH U-200) 200 UNIT/ML (3 ML) INSULIN PEN    Inject 40 Units into the skin once daily.    IPRATROPIUM (ATROVENT) 0.02 % NEBULIZER SOLUTION    INHALE THE CONTENTS OF 1  VIAL VIA NEBULIZER FOUR TIMES DAILY    ISOSORBIDE MONONITRATE (IMDUR) 60 MG 24 HR TABLET    TAKE 1 TABLET EVERY DAY    LANCETS MISC    1 each by Misc.(Non-Drug; Combo Route) route 4 (four) times daily.    LATANOPROST 0.005 % OPHTHALMIC SOLUTION    PLACE 1 DROP INTO BOTH EYES ONCE DAILY.    LISINOPRIL 10 MG TABLET    Take 10 mg by mouth once daily.    MAGNESIUM OXIDE (MAG-OX) 400 MG (241.3 MG MAGNESIUM) TABLET    Take 1 tablet (400 mg total) by mouth once daily.    METFORMIN (GLUCOPHAGE-XR) 500 MG ER 24HR TABLET    TAKE 2 TABLETS TWICE DAILY WITH MEALS    METHYLPREDNISOLONE (MEDROL DOSEPACK) 4 MG TABLET    use as directed    METOPROLOL SUCCINATE (TOPROL-XL) 50 MG 24 HR TABLET    Take 50 mg by mouth every evening.    MIDAZOLAM (VERSED) 1 MG/ML INJECTION        NAPROXEN (EC NAPROSYN) 500 MG EC TABLET    TAKE 1 TABLET(500 MG) BY MOUTH TWICE DAILY WITH MEALS FOR 14 DAYS FOR SHOULDER OR BACK PAIN    NITROGLYCERIN (NITROSTAT) 0.4 MG SL TABLET    Place 1 tablet (0.4 mg total) under the tongue every 5 (five) minutes as needed for Chest pain.    OMNIPAQUE 240 240 MG IODINE/ML INJECTION        OZEMPIC 1 MG/DOSE (2 MG/1.5 ML) PNIJ    Inject 0.75 mLs (1 mg total) into the skin once a week.    OZEMPIC 1 MG/DOSE (4 MG/3 ML)    INJECT 1MG SUBCUTANEOUSLY ONE TIME WEEKLY    PANTOPRAZOLE (PROTONIX) 40 MG TABLET    Take 1 tablet (40 mg total) by mouth once daily.    RANOLAZINE (RANEXA) 500 MG TB12    Take 1 tablet (500 mg total) by mouth 2 (two) times daily.    SERTRALINE (ZOLOFT) 100 MG TABLET    Take 1 tablet (100 mg total) by mouth every evening.    TACROLIMUS (PROTOPIC) 0.1 % OINTMENT    Apply topically 2 (two) times daily.    TAMSULOSIN (FLOMAX) 0.4 MG CAP    Take 1 capsule (0.4 mg total) by mouth once daily.    TIOTROPIUM (SPIRIVA WITH HANDIHALER) 18 MCG INHALATION CAPSULE    INHALE THE CONTENTS OF 1 CAPSULE ONE TIME DAILY (CONTROLLER)    TRAZODONE (DESYREL) 100 MG TABLET    TAKE 2 TABLETS EVERY EVENING    TRIAMCINOLONE ACETONIDE  0.1% (KENALOG) 0.1 % OINTMENT    Apply topically 2 (two) times daily. Use on legs and back    VIOS AEROSOL DELIVERY SYSTEM VIN    USE AS DIRESTED   Modified Medications    No medications on file   Discontinued Medications    No medications on file       Family History   Problem Relation Age of Onset    Glaucoma Mother     Cataracts Mother     Diabetes Mother     Cataracts Father     Stroke Father     Glaucoma Sister         x3    Cataracts Sister         x3    Diabetes Sister         x1    Stroke Sister         x1    Glaucoma Maternal Aunt     Diabetes Maternal Aunt     No Known Problems Brother     No Known Problems Daughter     No Known Problems Son     Cancer Maternal Grandfather         lung (smoker)    Prostate cancer Neg Hx     Heart disease Neg Hx     Kidney disease Neg Hx        Review of patient's allergies indicates:   Allergen Reactions    Protamine Hives     Urticaria, possible upper airway swelling         Objective:      Physical Exam  Patient is alert and oriented, no distress. Skin is intact. Neuro is normal with no focal motor or sensory findings.    Cervical exam is unremarkable. Intact cervical ROM. Negative Spurling's test    Physical Exam:   RIGHT    LEFT    Scap Dyskinesis/Winging  (-)    +    Tenderness:          Greater Tuberosity   (-)    +  Bicipital Groove   (-)    +  AC joint    (-)    (-)  Other:     ROM:  Forward Elevation   160    130  Abduction    120    100  ER (at side)    80    40  IR     T8    T12    Passive ROM: symmetric and full    Strength:   Supraspinatus    5/5    3/5  Infraspinatus    5/5    3/5  Subscap / IR    5/5    5/5     Special Tests:   Neer:    (-)    +   Arevalo:   (-)    +   SS Stress:   (-)    +   Bear Hug:   (-)    (-)   [place]'s:   (-)    +   Resisted Thrower's:   (-)    +   Cross Arm Abduction:  (-)    (-)    Imaging:    MRI SHOULDER WITHOUT CONTRAST LEFT     CLINICAL HISTORY:  left shoulder pain;  Pain in left  shoulder     TECHNIQUE:  Multiplanar/multisequence MRI of the left shoulder was performed without the use of intravenous or intra-articular gadolinium.     COMPARISON:  Radiographs dated 12/02/2019     FINDINGS:  Rotator cuff: There is a massive full-thickness rotator cuff tear involving the full widths of the supraspinatus and infraspinatus tendons spanning approximately 6 cm AP with retraction to the level of the glenoid.  There is moderate tendinosis of the subscapularis tendon with no definite tear visualized.  The teres minor tendon appears intact.  Fatty atrophy of the supraspinatus and infraspinatus muscle bellies is noted.     Labrum: Global degenerative changes noted.     Long head of the biceps: Intact     Bones: No evidence of fracture or marrow replacement process.     AC joint: Within normal limits.     Cartilage: No large glenohumeral articular cartilage defect demonstrated on this non arthrographic study.     Miscellaneous: Mild arthrosis     Impression:     Massive rotator cuff tear as above with associated atrophy of the supraspinatus and infraspinatus muscle bellies.     Moderate tendinosis of the subscapularis.        Electronically signed by: Jame Stephens MD  Date:                                            12/16/2019         Physician read: I agree with the above impression.    Assessment/Plan:   Crow Green is a 66 y.o. male with rotator cuff arthropathy of left shoulder    Plan:    1. Discussed diagnosis and treatment options with him again today.  He has left shoulder rotator cuff tear arthropathy.  Plan is to undergo left reverse total shoulder arthroplasty, but he is here today so that we can discuss his postoperative recovery plans.  2. After our discussion today we decided to proceed with left reverse total shoulder arthroplasty at the [facility].  He will stay overnight there.  We will then discharge him home with home health.  He lives next door to his cousin and spends  most of his time at their house.  It is easy for him to walk 1 house over to arrive at the cousin's house.  Is also easy for the cousin to walk 1 house over and arrive at his house.  He reports that his cousin helps him with most of his grocery shopping and food preparation needs.  He reports that the cousin will be willing to help him after surgery.  3. Now that we have appropriate after surgical care planned out, we can proceed with surgery as scheduled.  4. Follow up with me 10-14 days after surgery.          Anthony Alvarado MD

## 2022-01-04 NOTE — H&P (VIEW-ONLY)
Orthopaedic Follow-Up Visit    Last Appointment: 11/15/2021  Diagnosis: Rotator cuff tear arthropathy of left shoulder  Prior Procedure: none    Crow Green is a 66 y.o. male who is here for f/u evaluation of the left shoulder. The patient was last seen here by me on 10/15/2021 at which point we decided to proceed with reverse total shoulder arthroplasty.  We sent him for cardiac clearance prior to undergoing surgery.  He was cleared by Cardiology, prior to his upcoming surgery there has been some concern about his postoperative plans.  He reports that he does not have any family that can stay with him and he is concerned about where he will go after surgery.     To review his history, Crow Green is a 65 y.o. Right-hand dominant male who presents with LEFT shoulder pain and dysfunction that developed over 1 year ago.  He does not recall any specific injury but has had gradual onset of pain and limited motion in that shoulder.  This has been progressive over time.  Pain is constantly aching with intermittent shooting quality.  Pain is worse with movement of the shoulder, lifting, reaching, and he also endorses night pain.  He has tried multiple nonoperative treatments including rest, activity modification, NSAIDs, physical therapy, and corticosteroid injection.    Patient's medications, allergies, past medical, surgical, social and family histories were reviewed and updated as appropriate.    Review of Systems   All systems reviewed were negative.  Specifically, the patient denies fever, chills, weight loss, chest pain, shortness of breath, or dyspnea on exertion.      Past Medical History:   Diagnosis Date    Arthritis     Asthma     Atrial fibrillation     Atrial fibrillation with RVR 8/7/2019    CHF (congestive heart failure)     Chronic obstructive pulmonary disease 8/5/2020    Depression     Dermatomyositis     Diabetes mellitus, type 2 Diagnosed in 2000    Elevated PSA     Follow  up 7/30/2020    Hypertension     Myocardial infarction     2017    Sleep apnea        Past Surgical History:   Procedure Laterality Date    ABLATION OF ARRHYTHMOGENIC FOCUS FOR ATRIAL FIBRILLATION N/A 2/27/2020    Procedure: Ablation atrial fibrillation;  Surgeon: Gio Brown MD;  Location: [facility] EP LAB;  Service: Cardiology;  Laterality: N/A;  afib, DAMIEN (cx if SR), PVI, PITO, anes, MB, 3 Prep    CHOLECYSTECTOMY      CLOSURE OF WOUND Left 7/1/2020    Procedure: CLOSURE, WOUND;  Surgeon: Barb Veras DPM;  Location: [facility] OR;  Service: Podiatry;  Laterality: Left;    INJECTION OF ANESTHETIC AGENT INTO SACROILIAC JOINT Left 3/24/2021    Procedure: Left BLOCK, SACROILIAC JOINT and bilateral rhomboid TPI;  Surgeon: Dillan Tsai MD;  Location: [facility] PAIN MGT;  Service: Pain Management;  Laterality: Left;    SELECTIVE INJECTION OF ANESTHETIC AGENT AROUND LUMBAR SPINAL NERVE ROOT BY TRANSFORAMINAL APPROACH Left 6/11/2020    Procedure: BLOCK, SPINAL NERVE ROOT, LUMBAR, SELECTIVE, TRANSFORAMINAL APPROACH Left L4-5, L5-S1 TFESI with RN IV sedation;  Surgeon: Dillan Tsai MD;  Location: [facility] PAIN MGT;  Service: Pain Management;  Laterality: Left;    TOE AMPUTATION Left 6/29/2020    Procedure: AMPUTATION, TOE;  Surgeon: Barb Veras DPM;  Location: [facility];  Service: Podiatry;  Laterality: Left;  great toe       Patient's Medications   New Prescriptions    No medications on file   Previous Medications    ALBUTEROL (PROVENTIL) 2.5 MG /3 ML (0.083 %) NEBULIZER SOLUTION    Take 3 mLs (2.5 mg total) by nebulization every 4 to 6 hours as needed for Wheezing or Shortness of Breath.    ALLOPURINOL (ZYLOPRIM) 100 MG TABLET    Take 1 tablet (100 mg total) by mouth once daily.    AMLODIPINE (NORVASC) 2.5 MG TABLET    Take 1 tablet (2.5 mg total) by mouth once daily.    ASPIRIN (ECOTRIN) 81 MG EC TABLET    Take 1 tablet (81 mg total) by mouth once daily.    ATORVASTATIN (LIPITOR) 40 MG TABLET    Take 1 tablet (40 mg  total) by mouth every evening.    AZATHIOPRINE (IMURAN) 50 MG TAB    Take 1 tablet (50 mg total) by mouth once daily.    AZITHROMYCIN (ZITHROMAX Z-HOLDEN) 250 MG TABLET    500 mg on day 1 (two tablets) followed by 250 mg once daily on days 2-5    BACLOFEN (LIORESAL) 10 MG TABLET    TAKE 1 TABLET EVERY DAY    BLOOD SUGAR DIAGNOSTIC STRP    1 each by Misc.(Non-Drug; Combo Route) route 4 (four) times daily.    BLOOD-GLUCOSE METER (TRUE METRIX GLUCOSE METER) KIT    Use as instructed to test blood glucose meter daily.    COLCHICINE (COLCRYS) 0.6 MG TABLET    TAKE 1 TABLET (0.6 MG TOTAL) BY MOUTH ONCE DAILY.    DEPO-MEDROL 40 MG/ML INJECTION        DICLOFENAC SODIUM (VOLTAREN) 1 % GEL    APPLY 2 GRAMS TOPICALLY FOUR TIMES DAILY    ELIQUIS 5 MG TAB    TAKE 1 TABLET TWICE DAILY    EMPAGLIFLOZIN (JARDIANCE) 25 MG TABLET    Take 1 tablet (25 mg total) by mouth once daily.    FAMOTIDINE (PEPCID) 20 MG TABLET    Take 1 tablet (20 mg total) by mouth 2 (two) times daily.    FENTANYL (SUBLIMAZE) 50 MCG/ML INJECTION        FLUTICASONE-SALMETEROL DISKUS INHALER 250-50 MCG    Inhale 1 puff into the lungs 2 (two) times daily. Controller    FOOD SUPPLEMT, LACTOSE-REDUCED (ENSURE) LIQD    Take 118 mLs by mouth 3 (three) times daily with meals.    FUROSEMIDE (LASIX) 40 MG TABLET    Take 1 tablet (40 mg total) by mouth once daily. TAKE 2 TABLETS EVERY MORNING  AND TAKE 1 TABLET EVERY EVENING    GABAPENTIN (NEURONTIN) 800 MG TABLET    Take 1 tablet (800 mg total) by mouth 3 (three) times daily.    HEPARIN SODIUM,PORCINE/D5W (HEPARIN, PORCINE, IN 5 % DEX) 25,000 UNIT/250 ML(100 UNIT/ML) INFUSION        INSULIN ASPART U-100 (NOVOLOG FLEXPEN U-100 INSULIN) 100 UNIT/ML (3 ML) INPN PEN    INJECT 10 UNITS SUBCUTANEOUSLY THREE TIMES DAILY WITH MEALS    INSULIN DEGLUDEC (TRESIBA FLEXTOUCH U-200) 200 UNIT/ML (3 ML) INSULIN PEN    Inject 40 Units into the skin once daily.    IPRATROPIUM (ATROVENT) 0.02 % NEBULIZER SOLUTION    INHALE THE CONTENTS OF 1  VIAL VIA NEBULIZER FOUR TIMES DAILY    ISOSORBIDE MONONITRATE (IMDUR) 60 MG 24 HR TABLET    TAKE 1 TABLET EVERY DAY    LANCETS MISC    1 each by Misc.(Non-Drug; Combo Route) route 4 (four) times daily.    LATANOPROST 0.005 % OPHTHALMIC SOLUTION    PLACE 1 DROP INTO BOTH EYES ONCE DAILY.    LISINOPRIL 10 MG TABLET    Take 10 mg by mouth once daily.    MAGNESIUM OXIDE (MAG-OX) 400 MG (241.3 MG MAGNESIUM) TABLET    Take 1 tablet (400 mg total) by mouth once daily.    METFORMIN (GLUCOPHAGE-XR) 500 MG ER 24HR TABLET    TAKE 2 TABLETS TWICE DAILY WITH MEALS    METHYLPREDNISOLONE (MEDROL DOSEPACK) 4 MG TABLET    use as directed    METOPROLOL SUCCINATE (TOPROL-XL) 50 MG 24 HR TABLET    Take 50 mg by mouth every evening.    MIDAZOLAM (VERSED) 1 MG/ML INJECTION        NAPROXEN (EC NAPROSYN) 500 MG EC TABLET    TAKE 1 TABLET(500 MG) BY MOUTH TWICE DAILY WITH MEALS FOR 14 DAYS FOR SHOULDER OR BACK PAIN    NITROGLYCERIN (NITROSTAT) 0.4 MG SL TABLET    Place 1 tablet (0.4 mg total) under the tongue every 5 (five) minutes as needed for Chest pain.    OMNIPAQUE 240 240 MG IODINE/ML INJECTION        OZEMPIC 1 MG/DOSE (2 MG/1.5 ML) PNIJ    Inject 0.75 mLs (1 mg total) into the skin once a week.    OZEMPIC 1 MG/DOSE (4 MG/3 ML)    INJECT 1MG SUBCUTANEOUSLY ONE TIME WEEKLY    PANTOPRAZOLE (PROTONIX) 40 MG TABLET    Take 1 tablet (40 mg total) by mouth once daily.    RANOLAZINE (RANEXA) 500 MG TB12    Take 1 tablet (500 mg total) by mouth 2 (two) times daily.    SERTRALINE (ZOLOFT) 100 MG TABLET    Take 1 tablet (100 mg total) by mouth every evening.    TACROLIMUS (PROTOPIC) 0.1 % OINTMENT    Apply topically 2 (two) times daily.    TAMSULOSIN (FLOMAX) 0.4 MG CAP    Take 1 capsule (0.4 mg total) by mouth once daily.    TIOTROPIUM (SPIRIVA WITH HANDIHALER) 18 MCG INHALATION CAPSULE    INHALE THE CONTENTS OF 1 CAPSULE ONE TIME DAILY (CONTROLLER)    TRAZODONE (DESYREL) 100 MG TABLET    TAKE 2 TABLETS EVERY EVENING    TRIAMCINOLONE ACETONIDE  0.1% (KENALOG) 0.1 % OINTMENT    Apply topically 2 (two) times daily. Use on legs and back    VIOS AEROSOL DELIVERY SYSTEM VIN    USE AS DIRESTED   Modified Medications    No medications on file   Discontinued Medications    No medications on file       Family History   Problem Relation Age of Onset    Glaucoma Mother     Cataracts Mother     Diabetes Mother     Cataracts Father     Stroke Father     Glaucoma Sister         x3    Cataracts Sister         x3    Diabetes Sister         x1    Stroke Sister         x1    Glaucoma Maternal Aunt     Diabetes Maternal Aunt     No Known Problems Brother     No Known Problems Daughter     No Known Problems Son     Cancer Maternal Grandfather         lung (smoker)    Prostate cancer Neg Hx     Heart disease Neg Hx     Kidney disease Neg Hx        Review of patient's allergies indicates:   Allergen Reactions    Protamine Hives     Urticaria, possible upper airway swelling         Objective:      Physical Exam  Patient is alert and oriented, no distress. Skin is intact. Neuro is normal with no focal motor or sensory findings.    Cervical exam is unremarkable. Intact cervical ROM. Negative Spurling's test    Physical Exam:   RIGHT    LEFT    Scap Dyskinesis/Winging  (-)    +    Tenderness:          Greater Tuberosity   (-)    +  Bicipital Groove   (-)    +  AC joint    (-)    (-)  Other:     ROM:  Forward Elevation   160    130  Abduction    120    100  ER (at side)    80    40  IR     T8    T12    Passive ROM: symmetric and full    Strength:   Supraspinatus    5/5    3/5  Infraspinatus    5/5    3/5  Subscap / IR    5/5    5/5     Special Tests:   Neer:    (-)    +   Arevalo:   (-)    +   SS Stress:   (-)    +   Bear Hug:   (-)    (-)   [place]'s:   (-)    +   Resisted Thrower's:   (-)    +   Cross Arm Abduction:  (-)    (-)    Imaging:    MRI SHOULDER WITHOUT CONTRAST LEFT     CLINICAL HISTORY:  left shoulder pain;  Pain in left  shoulder     TECHNIQUE:  Multiplanar/multisequence MRI of the left shoulder was performed without the use of intravenous or intra-articular gadolinium.     COMPARISON:  Radiographs dated 12/02/2019     FINDINGS:  Rotator cuff: There is a massive full-thickness rotator cuff tear involving the full widths of the supraspinatus and infraspinatus tendons spanning approximately 6 cm AP with retraction to the level of the glenoid.  There is moderate tendinosis of the subscapularis tendon with no definite tear visualized.  The teres minor tendon appears intact.  Fatty atrophy of the supraspinatus and infraspinatus muscle bellies is noted.     Labrum: Global degenerative changes noted.     Long head of the biceps: Intact     Bones: No evidence of fracture or marrow replacement process.     AC joint: Within normal limits.     Cartilage: No large glenohumeral articular cartilage defect demonstrated on this non arthrographic study.     Miscellaneous: Mild arthrosis     Impression:     Massive rotator cuff tear as above with associated atrophy of the supraspinatus and infraspinatus muscle bellies.     Moderate tendinosis of the subscapularis.        Electronically signed by: Jame Stephens MD  Date:                                            12/16/2019         Physician read: I agree with the above impression.    Assessment/Plan:   Crow Green is a 66 y.o. male with rotator cuff arthropathy of left shoulder    Plan:    1. Discussed diagnosis and treatment options with him again today.  He has left shoulder rotator cuff tear arthropathy.  Plan is to undergo left reverse total shoulder arthroplasty, but he is here today so that we can discuss his postoperative recovery plans.  2. After our discussion today we decided to proceed with left reverse total shoulder arthroplasty at the [facility].  He will stay overnight there.  We will then discharge him home with home health.  He lives next door to his cousin and spends  most of his time at their house.  It is easy for him to walk 1 house over to arrive at the cousin's house.  Is also easy for the cousin to walk 1 house over and arrive at his house.  He reports that his cousin helps him with most of his grocery shopping and food preparation needs.  He reports that the cousin will be willing to help him after surgery.  3. Now that we have appropriate after surgical care planned out, we can proceed with surgery as scheduled.  4. Follow up with me 10-14 days after surgery.          Anthony Alvarado MD

## 2022-01-05 ENCOUNTER — PATIENT MESSAGE (OUTPATIENT)
Dept: PHARMACY | Facility: CLINIC | Age: 67
End: 2022-01-05
Payer: MEDICARE

## 2022-01-05 ENCOUNTER — OUTPATIENT CASE MANAGEMENT (OUTPATIENT)
Dept: ADMINISTRATIVE | Facility: OTHER | Age: 67
End: 2022-01-05
Payer: MEDICARE

## 2022-01-05 ENCOUNTER — OFFICE VISIT (OUTPATIENT)
Dept: DIABETES | Facility: CLINIC | Age: 67
End: 2022-01-05
Payer: MEDICARE

## 2022-01-05 DIAGNOSIS — E78.5 HYPERLIPIDEMIA ASSOCIATED WITH TYPE 2 DIABETES MELLITUS: ICD-10-CM

## 2022-01-05 DIAGNOSIS — I50.32 CHRONIC DIASTOLIC HEART FAILURE: ICD-10-CM

## 2022-01-05 DIAGNOSIS — E11.3312 MODERATE NONPROLIFERATIVE DIABETIC RETINOPATHY OF LEFT EYE WITH MACULAR EDEMA ASSOCIATED WITH TYPE 2 DIABETES MELLITUS: ICD-10-CM

## 2022-01-05 DIAGNOSIS — E11.59 HYPERTENSION ASSOCIATED WITH DIABETES: ICD-10-CM

## 2022-01-05 DIAGNOSIS — E11.42 DIABETIC POLYNEUROPATHY ASSOCIATED WITH TYPE 2 DIABETES MELLITUS: ICD-10-CM

## 2022-01-05 DIAGNOSIS — I25.118 CORONARY ARTERY DISEASE OF NATIVE ARTERY OF NATIVE HEART WITH STABLE ANGINA PECTORIS: ICD-10-CM

## 2022-01-05 DIAGNOSIS — N52.9 ERECTILE DYSFUNCTION, UNSPECIFIED ERECTILE DYSFUNCTION TYPE: ICD-10-CM

## 2022-01-05 DIAGNOSIS — E29.1 HYPOGONADISM IN MALE: ICD-10-CM

## 2022-01-05 DIAGNOSIS — E11.65 UNCONTROLLED TYPE 2 DIABETES MELLITUS WITH HYPERGLYCEMIA: Primary | ICD-10-CM

## 2022-01-05 DIAGNOSIS — E11.69 HYPERLIPIDEMIA ASSOCIATED WITH TYPE 2 DIABETES MELLITUS: ICD-10-CM

## 2022-01-05 DIAGNOSIS — I15.2 HYPERTENSION ASSOCIATED WITH DIABETES: ICD-10-CM

## 2022-01-05 DIAGNOSIS — E66.9 OBESITY (BMI 30.0-34.9): ICD-10-CM

## 2022-01-05 DIAGNOSIS — E21.5 DISORDER OF PARATHYROID GLAND: ICD-10-CM

## 2022-01-05 PROCEDURE — 99442 PR PHYSICIAN TELEPHONE EVALUATION 11-20 MIN: CPT | Mod: HCNC,95,, | Performed by: PHYSICIAN ASSISTANT

## 2022-01-05 PROCEDURE — 99442 PR PHYSICIAN TELEPHONE EVALUATION 11-20 MIN: ICD-10-PCS | Mod: HCNC,95,, | Performed by: PHYSICIAN ASSISTANT

## 2022-01-05 NOTE — PROGRESS NOTES
Outpatient Care Management   - Care Plan Follow Up    Patient: Crow Green  MRN:  [id number]  Date of Service:  1/5/2022  Completed by:  Amanda Whipple LCSW  Referral Date: 09/16/2021  Program: Case Management (High Risk)    Reason for Visit   Patient presents with    Update Plan Of Care       Brief Summary: Received call from patient on 1/4/22, expressed concern about what he will do after surgery. He stated that his surgery is on 1/10 and was informed that he will be discharged after 1 day in the hospital, rehab/SNF not available.   Collaborated with OPCM RN, patient will be referred for home health after surgery.   Phoned patient on 1/5/22, patient acknowledged understanding. He asked about not getting his SNAP benefits on the 3rd, provided directions for checking on the benefit. He said that he had been trying to reach SNAP and will continue to try to reach them.   Patient asked about update on his scooter, reinforced previous results that repairs will be covered under home owner's insurance with deductible of $2000. He expressed understanding.   Discussed using [employer] transportation for non-medical transportation. He said that he would call them.       Complex Care Plan     Care plan was discussed and completed today with input from patient and/or caregiver.    Patient Instructions     Instructions were provided via the [facility] patient resources and are available for the patient to view on the patient portal.    Follow up in about 12 days (around 1/17/2022) for Update on IOP one week after surgery. .    Todays OPCM Self-Management Care Plan was developed with the patients/caregivers input and was based on identified barriers from todays assessment.  Goals were written today with the patient/caregiver and the patient has agreed to work towards these goals to improve his/her overall well-being. Patient verbalized understanding of the care plan, goals, and all of today's instructions.  Encouraged patient/caregiver to communicate with his/her physician and health care team about health conditions and the treatment plan.  Provided my contact information today and encouraged patient/caregiver to call me with any questions as needed.

## 2022-01-05 NOTE — PROGRESS NOTES
PCP: Mateo Lambert DO    Subjective:     Chief Complaint: Diabetes - Established Patient    Established Patient - Audio Only Telehealth Visit     The patient location is: Home  The chief complaint leading to consultation is: Diabetes follow up  Visit type: Virtual visit with audio only (telephone)  Total Time Spent with Patient: 15 minutes     The reason for the audio only service rather than synchronous audio and video virtual visit was related to technical difficulties or patient preference/necessity.     Each patient to whom I provide medical services by telemedicine is:  (1) informed of the relationship between the physician and patient and the respective role of any other health care provider with respect to management of the patient; and (2) notified that they may decline to receive medical services by telemedicine and may withdraw from such care at any time. Patient verbally consented to receive this service via voice-only telephone call.    This service was not originating from a related E/M service provided within the previous 7 days nor will  to an E/M service or procedure within the next 24 hours or my soonest available appointment.  Prevailing standard of care was able to be met in this audio-only visit.       HISTORY OF PRESENT ILLNESS: 66 y.o.   male presenting for diabetes management visit.   The patient's last visit with me was on 11/11/2021.  Patient has issues with transportation.   Patient has had Type II diabetes since 2000.  Pertinent to decision making is the following comorbidities: HTN, HLD, CAD, CHF, CKD III, Obesity by BMI, ED and Parathryoid disorder and Hypogonadism   Patient has the following Diabetes complications: with diabetic chronic kidney disease, with diabetic polyneuropathy and with diabetic retinopathy; mild non-proliferative; without macular edema  He has attended diabetes education in the past.     Patient's most recent A1c of 6.7% was completed 1  weeks ago.   Patient states since His last A1c His blood glucose levels have been within range throughout the day .   Patient monitors blood glucose 4 times per day with meter : Fasting, Before Lunch, Before Dinner and Before Bed. Dexcom CGM sample used on phone but Clarity not set up due to missed appt with CDE. Patient needs new supplies through [facility]. Per melany, pending.   Patient blood glucose monitoring device will not be uploaded into [employer] [facility] today secondary to patient forgetting device.   Per patient recall, all blood sugars ranging from 40s to 200s postprandial when skipping meal time insulin.   Patient endorses the following diabetes related symptoms: None.   Patient is due today for the following diabetes-related health maintenance standards: None - up to date .   He denies recent hospital admissions or emergency room visits.   He denies having hypoglycemia.  Patient's concerns today include glycemic control. Of note, patient has upcoming should replacement surgery scheduled for 1/10.   Patient medication regimen is as below.     CURRENT DM MEDICATIONS:    Lantus 40 units at night    Novolog 10 units three times daily with meals - taking only if    Jardiance 25 mg daily   Ozempic 1 mg weekly     Patient has failed the following Diabetes medications:    Bydureon   Metformin - GI      Labs Reviewed.       Lab Results   Component Value Date    CPEPTIDE 4.37 07/20/2017     Lab Results   Component Value Date    GLUTAMICACID 0.01 07/20/2017          //   , There is no height or weight on file to calculate BMI.  His blood sugar in clinic today is:    78    Review of Systems   Constitutional: Negative for activity change, appetite change, chills and fever.   HENT: Negative for dental problem, mouth sores, nosebleeds, sore throat and trouble swallowing.    Eyes: Negative for pain and discharge.   Respiratory: Negative for shortness of breath, wheezing and stridor.    Cardiovascular: Negative for  chest pain, palpitations and leg swelling.   Gastrointestinal: Negative for abdominal pain, diarrhea, nausea and vomiting.   Endocrine: Negative for polydipsia, polyphagia and polyuria.   Genitourinary: Negative for dysuria, frequency and urgency.   Musculoskeletal: Negative for joint swelling and myalgias.   Skin: Negative for rash and wound.   Neurological: Negative for dizziness, syncope, weakness and headaches.   Psychiatric/Behavioral: Negative for behavioral problems and dysphoric mood.         Diabetes Management Status  Statin: Taking  ACE/ARB: Taking    Screening or Prevention Patient's value Goal Complete/Controlled?   HgA1C Testing and Control   Lab Results   Component Value Date    HGBA1C 6.7 (H) 12/02/2021      Annually/Less than 8% Yes   Lipid profile : 05/25/2021 Annually Yes   LDL control Lab Results   Component Value Date    LDLCALC 62.6 (L) 05/25/2021    Annually/Less than 100 mg/dl  Yes   Nephropathy screening Lab Results   Component Value Date    MICALBCREAT 421.6 (H) 07/08/2021     Lab Results   Component Value Date    PROTEINUA 3+ (A) 08/03/2021    Annually No   Blood pressure BP Readings from Last 1 Encounters:   12/08/21 126/76    Less than 140/90 Yes   Dilated retinal exam : 04/06/2021 Annually Yes    Foot exam   : 06/03/2021 Annually Yes     Social History     Socioeconomic History    Marital status: Legally     Number of children: 5   Occupational History    Occupation: disabled    Occupation: PT at [employer]    Tobacco Use    Smoking status: Never Smoker    Smokeless tobacco: Never Used   Substance and Sexual Activity    Alcohol use: Yes     Comment: rare, maybe holidays    Drug use: Not Currently     Types: Cocaine    Sexual activity: Not Currently     Partners: Female   Social History Narrative    Utilizing [employer] transportation which has been unreliable.      Social Determinants of Health     Financial Resource Strain: Medium Risk    Difficulty of  Paying Living Expenses: Somewhat hard   Food Insecurity: Food Insecurity Present    Worried About Running Out of Food in the Last Year: Often true    Ran Out of Food in the Last Year: Often true   Transportation Needs: Unmet Transportation Needs    Lack of Transportation (Medical): Yes    Lack of Transportation (Non-Medical): Yes   Physical Activity: Insufficiently Active    Days of Exercise per Week: 1 day    Minutes of Exercise per Session: 60 min   Stress: Stress Concern Present    Feeling of Stress : Very much   Social Connections: Moderately Isolated    Frequency of Communication with Friends and Family: More than three times a week    Frequency of Social Gatherings with Friends and Family: Once a week    Attends Cheondoism Services: More than 4 times per year    Active Member of Clubs or Organizations: No    Attends Club or Organization Meetings: Never    Marital Status:    Housing Stability: Low Risk     Unable to Pay for Housing in the Last Year: No    Number of Places Lived in the Last Year: 2    Unstable Housing in the Last Year: No     Past Medical History:   Diagnosis Date    Arthritis     Asthma     Atrial fibrillation     Atrial fibrillation with RVR 8/7/2019    CHF (congestive heart failure)     Chronic obstructive pulmonary disease 8/5/2020    Depression     Dermatomyositis     Diabetes mellitus, type 2 Diagnosed in 2000    Elevated PSA     Follow up 7/30/2020    Hypertension     Myocardial infarction     2017    Sleep apnea        Objective:        Physical Exam  Neurological:      Mental Status: He is alert and oriented to person, place, and time. Mental status is at baseline.   Psychiatric:         Mood and Affect: Mood normal.         Behavior: Behavior normal.         Thought Content: Thought content normal.         Judgment: Judgment normal.           Assessment / Plan:     Uncontrolled type 2 diabetes mellitus with hyperglycemia    Moderate  nonproliferative diabetic retinopathy of left eye with macular edema associated with type 2 diabetes mellitus    Diabetic polyneuropathy associated with type 2 diabetes mellitus    Erectile dysfunction, unspecified erectile dysfunction type    Chronic diastolic heart failure    Coronary artery disease of native artery of native heart with stable angina pectoris    Hypertension associated with diabetes    Hyperlipidemia associated with type 2 diabetes mellitus    Disorder of parathyroid gland    Hypogonadism in male    Obesity (BMI 30.0-34.9)      Additional Plan Details:    - POCT Glucose  - Encouraged continuation of lifestyle changes including regular exercise and limiting carbohydrates to 30-45 grams per meal threes times daily and 15 grams per snack with a limit of two daily.   - Encouraged continued monitoring of blood glucose with maintenance of 4 times daily and Continuously with personal CGM Dexcom.    - Current DM Medication Regimen: Change Lantus 35 units daily. Change Novolog 5 units t.i.d. before meals and correction dosing every 3 hours.  Continue Ozempic 1 mg weekly. Continue Jardiance 25 mg daily.   - Health Maintenance standards addressed today: None - up to date  - Nursing Visit: Patient is below goal range for nursing visit for age group and Will need blood sugar log review on Monday.   - Follow up in 4 weeks with A1c prior.      Blakeney McKnight, PA-C [facility]

## 2022-01-06 ENCOUNTER — OUTPATIENT CASE MANAGEMENT (OUTPATIENT)
Dept: ADMINISTRATIVE | Facility: OTHER | Age: 67
End: 2022-01-06
Payer: MEDICARE

## 2022-01-06 NOTE — PROGRESS NOTES
Outpatient Care Management  Plan of Care Follow Up Visit    Patient: Crow Green  MRN: [id number]  Date of Service: 01/06/2022  Completed by: Avni Potts RN  Referral Date: 09/16/2021  Program: Case Management (High Risk)    Reason for Visit   Patient presents with    Update Plan Of Care       Brief Summary: Phone contact made with Mr. Green and we discussed his depression care plan. He is scheduled for surgery son 1/10/22. Plan to follow up after surgery to make sure he is doing okay at home.     Patient Summary     Involvement of Care:  Do I have permission to speak with other family members about your care?   no     Patient Reported Labs & Vitals:  1.  Any Patient Reported Labs & Vitals?   no  2.  Patient Reported Blood Pressure:     3.  Patient Reported Pulse:     4.  Patient Reported Weight (Kg):     5.  Patient Reported Blood Glucose (mg/dl):       Medical and social history was reviewed with patient and/or caregiver.     Clinical Assessment     Reviewed and provided basic information on available community resources for mental health, transportation, wellness resources, and palliative care programs with patient and/or caregiver.     Complex Care Plan     Care plan was discussed and completed today with input from patient and/or caregiver.    Patient Instructions     Instructions were provided via the [facility] patient resources and are available for the patient to view on the patient portal.    Next Steps:  Follow up next week after surgery to continue education and management         Todays OPCM Self-Management Care Plan was developed with the patients/caregivers input and was based on identified barriers from todays assessment.  Goals were written today with the patient/caregiver and the patient has agreed to work towards these goals to improve his/her overall well-being. Patient verbalized understanding of the care plan, goals, and all of today's instructions. Encouraged patient/caregiver  to communicate with his/her physician and health care team about health conditions and the treatment plan.  Provided my contact information today and encouraged patient/caregiver to call me with any questions as needed.

## 2022-01-07 ENCOUNTER — LAB VISIT (OUTPATIENT)
Dept: PRIMARY CARE CLINIC | Facility: CLINIC | Age: 67
End: 2022-01-07
Payer: MEDICARE

## 2022-01-07 DIAGNOSIS — Z01.818 PREOP TESTING: ICD-10-CM

## 2022-01-07 LAB — SARS-COV-2 RNA RESP QL NAA+PROBE: NOT DETECTED

## 2022-01-07 PROCEDURE — U0003 INFECTIOUS AGENT DETECTION BY NUCLEIC ACID (DNA OR RNA); SEVERE ACUTE RESPIRATORY SYNDROME CORONAVIRUS 2 (SARS-COV-2) (CORONAVIRUS DISEASE [COVID-19]), AMPLIFIED PROBE TECHNIQUE, MAKING USE OF HIGH THROUGHPUT TECHNOLOGIES AS DESCRIBED BY CMS-2020-01-R: HCPCS | Mod: HCNC | Performed by: STUDENT IN AN ORGANIZED HEALTH CARE EDUCATION/TRAINING PROGRAM

## 2022-01-07 PROCEDURE — U0005 INFEC AGEN DETEC AMPLI PROBE: HCPCS | Performed by: STUDENT IN AN ORGANIZED HEALTH CARE EDUCATION/TRAINING PROGRAM

## 2022-01-08 LAB — SARS-COV-2 RNA RESP QL NAA+PROBE: NOT DETECTED

## 2022-01-10 ENCOUNTER — HOSPITAL ENCOUNTER (OUTPATIENT)
Facility: HOSPITAL | Age: 67
Discharge: HOME | End: 2022-01-11
Attending: STUDENT IN AN ORGANIZED HEALTH CARE EDUCATION/TRAINING PROGRAM | Admitting: STUDENT IN AN ORGANIZED HEALTH CARE EDUCATION/TRAINING PROGRAM
Payer: MEDICARE

## 2022-01-10 DIAGNOSIS — M75.102 ROTATOR CUFF TEAR ARTHROPATHY OF LEFT SHOULDER: Primary | ICD-10-CM

## 2022-01-10 DIAGNOSIS — M12.812 ROTATOR CUFF TEAR ARTHROPATHY OF LEFT SHOULDER: Primary | ICD-10-CM

## 2022-01-10 LAB
POCT GLUCOSE: 129 MG/DL (ref 70–110)
POCT GLUCOSE: 220 MG/DL (ref 70–110)

## 2022-01-10 PROCEDURE — 71000039 HC RECOVERY, EACH ADD'L HOUR: Mod: HCNC | Performed by: STUDENT IN AN ORGANIZED HEALTH CARE EDUCATION/TRAINING PROGRAM

## 2022-01-10 PROCEDURE — 63600175 PHARM REV CODE 636 W HCPCS: Mod: HCNC | Performed by: NURSE ANESTHETIST, CERTIFIED REGISTERED

## 2022-01-10 PROCEDURE — 37000008 HC ANESTHESIA 1ST 15 MINUTES: Mod: HCNC | Performed by: STUDENT IN AN ORGANIZED HEALTH CARE EDUCATION/TRAINING PROGRAM

## 2022-01-10 PROCEDURE — 63600175 PHARM REV CODE 636 W HCPCS: Mod: HCNC | Performed by: ANESTHESIOLOGY

## 2022-01-10 PROCEDURE — 36000710: Mod: HCNC | Performed by: STUDENT IN AN ORGANIZED HEALTH CARE EDUCATION/TRAINING PROGRAM

## 2022-01-10 PROCEDURE — 37000009 HC ANESTHESIA EA ADD 15 MINS: Mod: HCNC | Performed by: STUDENT IN AN ORGANIZED HEALTH CARE EDUCATION/TRAINING PROGRAM

## 2022-01-10 PROCEDURE — 27201423 OPTIME MED/SURG SUP & DEVICES STERILE SUPPLY: Mod: HCNC | Performed by: STUDENT IN AN ORGANIZED HEALTH CARE EDUCATION/TRAINING PROGRAM

## 2022-01-10 PROCEDURE — 36000711: Mod: HCNC | Performed by: STUDENT IN AN ORGANIZED HEALTH CARE EDUCATION/TRAINING PROGRAM

## 2022-01-10 PROCEDURE — 25000003 PHARM REV CODE 250: Mod: HCNC | Performed by: STUDENT IN AN ORGANIZED HEALTH CARE EDUCATION/TRAINING PROGRAM

## 2022-01-10 PROCEDURE — C1776 JOINT DEVICE (IMPLANTABLE): HCPCS | Mod: HCNC | Performed by: STUDENT IN AN ORGANIZED HEALTH CARE EDUCATION/TRAINING PROGRAM

## 2022-01-10 PROCEDURE — 63600175 PHARM REV CODE 636 W HCPCS: Mod: HCNC | Performed by: STUDENT IN AN ORGANIZED HEALTH CARE EDUCATION/TRAINING PROGRAM

## 2022-01-10 PROCEDURE — 76942 ECHO GUIDE FOR BIOPSY: CPT | Mod: HCNC | Performed by: ANESTHESIOLOGY

## 2022-01-10 PROCEDURE — 23472 PR RECONSTR TOTAL SHOULDER IMPLANT: ICD-10-PCS | Mod: HCNC,LT,, | Performed by: STUDENT IN AN ORGANIZED HEALTH CARE EDUCATION/TRAINING PROGRAM

## 2022-01-10 PROCEDURE — 25000242 PHARM REV CODE 250 ALT 637 W/ HCPCS: Mod: HCNC | Performed by: STUDENT IN AN ORGANIZED HEALTH CARE EDUCATION/TRAINING PROGRAM

## 2022-01-10 PROCEDURE — 82962 GLUCOSE BLOOD TEST: CPT | Mod: HCNC | Performed by: STUDENT IN AN ORGANIZED HEALTH CARE EDUCATION/TRAINING PROGRAM

## 2022-01-10 PROCEDURE — 71000033 HC RECOVERY, INTIAL HOUR: Mod: HCNC | Performed by: STUDENT IN AN ORGANIZED HEALTH CARE EDUCATION/TRAINING PROGRAM

## 2022-01-10 PROCEDURE — 25000003 PHARM REV CODE 250: Mod: HCNC | Performed by: NURSE ANESTHETIST, CERTIFIED REGISTERED

## 2022-01-10 PROCEDURE — 25000242 PHARM REV CODE 250 ALT 637 W/ HCPCS: Mod: HCNC | Performed by: ANESTHESIOLOGY

## 2022-01-10 PROCEDURE — C1713 ANCHOR/SCREW BN/BN,TIS/BN: HCPCS | Mod: HCNC | Performed by: STUDENT IN AN ORGANIZED HEALTH CARE EDUCATION/TRAINING PROGRAM

## 2022-01-10 PROCEDURE — 94640 AIRWAY INHALATION TREATMENT: CPT | Mod: HCNC

## 2022-01-10 PROCEDURE — 25000003 PHARM REV CODE 250: Mod: HCNC | Performed by: ANESTHESIOLOGY

## 2022-01-10 PROCEDURE — 23472 RECONSTRUCT SHOULDER JOINT: CPT | Mod: HCNC,LT,, | Performed by: STUDENT IN AN ORGANIZED HEALTH CARE EDUCATION/TRAINING PROGRAM

## 2022-01-10 DEVICE — SCREW BONE RSP 6.5X26 GLENOID: Type: IMPLANTABLE DEVICE | Site: SHOULDER | Status: FUNCTIONAL

## 2022-01-10 DEVICE — SCREW BONE RSP 5 X 14M GLENOID: Type: IMPLANTABLE DEVICE | Site: SHOULDER | Status: FUNCTIONAL

## 2022-01-10 DEVICE — SCREW BONE RSP 6.5X18 GLENOID: Type: IMPLANTABLE DEVICE | Site: SHOULDER | Status: FUNCTIONAL

## 2022-01-10 DEVICE — SCREW BONE RSP 5 X 22MM TITAN: Type: IMPLANTABLE DEVICE | Site: SHOULDER | Status: FUNCTIONAL

## 2022-01-10 DEVICE — BASEPLATE GLENOID REV RSP P2: Type: IMPLANTABLE DEVICE | Site: SHOULDER | Status: FUNCTIONAL

## 2022-01-10 DEVICE — IMPLANTABLE DEVICE: Type: IMPLANTABLE DEVICE | Site: SHOULDER | Status: FUNCTIONAL

## 2022-01-10 RX ORDER — LISINOPRIL 10 MG/1
10 TABLET ORAL DAILY
Status: DISCONTINUED | OUTPATIENT
Start: 2022-01-11 | End: 2022-01-11 | Stop reason: HOSPADM

## 2022-01-10 RX ORDER — ONDANSETRON 2 MG/ML
INJECTION INTRAMUSCULAR; INTRAVENOUS
Status: DISCONTINUED | OUTPATIENT
Start: 2022-01-10 | End: 2022-01-10

## 2022-01-10 RX ORDER — LIDOCAINE HYDROCHLORIDE 20 MG/ML
INJECTION INTRAVENOUS
Status: DISCONTINUED | OUTPATIENT
Start: 2022-01-10 | End: 2022-01-10

## 2022-01-10 RX ORDER — OXYCODONE AND ACETAMINOPHEN 5; 325 MG/1; MG/1
1 TABLET ORAL
Status: DISCONTINUED | OUTPATIENT
Start: 2022-01-10 | End: 2022-01-10 | Stop reason: HOSPADM

## 2022-01-10 RX ORDER — CHLORHEXIDINE GLUCONATE ORAL RINSE 1.2 MG/ML
10 SOLUTION DENTAL 2 TIMES DAILY
Status: DISCONTINUED | OUTPATIENT
Start: 2022-01-10 | End: 2022-01-11 | Stop reason: HOSPADM

## 2022-01-10 RX ORDER — DOCUSATE SODIUM 100 MG/1
100 CAPSULE, LIQUID FILLED ORAL 2 TIMES DAILY
Status: DISCONTINUED | OUTPATIENT
Start: 2022-01-10 | End: 2022-01-11 | Stop reason: HOSPADM

## 2022-01-10 RX ORDER — CEFAZOLIN SODIUM 2 G/50ML
2 SOLUTION INTRAVENOUS
Status: COMPLETED | OUTPATIENT
Start: 2022-01-10 | End: 2022-01-10

## 2022-01-10 RX ORDER — OXYCODONE HYDROCHLORIDE 5 MG/1
10 TABLET ORAL EVERY 4 HOURS PRN
Status: DISCONTINUED | OUTPATIENT
Start: 2022-01-10 | End: 2022-01-11 | Stop reason: HOSPADM

## 2022-01-10 RX ORDER — IBUPROFEN 200 MG
16 TABLET ORAL
Status: DISCONTINUED | OUTPATIENT
Start: 2022-01-10 | End: 2022-01-11 | Stop reason: HOSPADM

## 2022-01-10 RX ORDER — AZATHIOPRINE 50 MG/1
50 TABLET ORAL DAILY
Status: DISCONTINUED | OUTPATIENT
Start: 2022-01-11 | End: 2022-01-11 | Stop reason: HOSPADM

## 2022-01-10 RX ORDER — DEXAMETHASONE SODIUM PHOSPHATE 4 MG/ML
INJECTION, SOLUTION INTRA-ARTICULAR; INTRALESIONAL; INTRAMUSCULAR; INTRAVENOUS; SOFT TISSUE
Status: DISCONTINUED | OUTPATIENT
Start: 2022-01-10 | End: 2022-01-10

## 2022-01-10 RX ORDER — ONDANSETRON 2 MG/ML
4 INJECTION INTRAMUSCULAR; INTRAVENOUS DAILY PRN
Status: DISCONTINUED | OUTPATIENT
Start: 2022-01-10 | End: 2022-01-10 | Stop reason: HOSPADM

## 2022-01-10 RX ORDER — GABAPENTIN 400 MG/1
800 CAPSULE ORAL 3 TIMES DAILY
Status: DISCONTINUED | OUTPATIENT
Start: 2022-01-10 | End: 2022-01-11 | Stop reason: HOSPADM

## 2022-01-10 RX ORDER — GLUCAGON 1 MG
1 KIT INJECTION
Status: DISCONTINUED | OUTPATIENT
Start: 2022-01-10 | End: 2022-01-11 | Stop reason: HOSPADM

## 2022-01-10 RX ORDER — IPRATROPIUM BROMIDE 0.5 MG/2.5ML
0.5 SOLUTION RESPIRATORY (INHALATION) EVERY 6 HOURS
Status: DISCONTINUED | OUTPATIENT
Start: 2022-01-10 | End: 2022-01-11 | Stop reason: HOSPADM

## 2022-01-10 RX ORDER — PROPOFOL 10 MG/ML
VIAL (ML) INTRAVENOUS
Status: DISCONTINUED | OUTPATIENT
Start: 2022-01-10 | End: 2022-01-10

## 2022-01-10 RX ORDER — ROPIVACAINE HYDROCHLORIDE 5 MG/ML
INJECTION, SOLUTION EPIDURAL; INFILTRATION; PERINEURAL
Status: COMPLETED | OUTPATIENT
Start: 2022-01-10 | End: 2022-01-10

## 2022-01-10 RX ORDER — TAMSULOSIN HYDROCHLORIDE 0.4 MG/1
0.4 CAPSULE ORAL DAILY
Status: DISCONTINUED | OUTPATIENT
Start: 2022-01-11 | End: 2022-01-11 | Stop reason: HOSPADM

## 2022-01-10 RX ORDER — OXYCODONE HYDROCHLORIDE 5 MG/1
5 TABLET ORAL EVERY 4 HOURS PRN
Qty: 24 TABLET | Refills: 0 | Status: SHIPPED | OUTPATIENT
Start: 2022-01-10 | End: 2022-02-15

## 2022-01-10 RX ORDER — BACLOFEN 10 MG/1
10 TABLET ORAL DAILY
Status: DISCONTINUED | OUTPATIENT
Start: 2022-01-11 | End: 2022-01-11 | Stop reason: HOSPADM

## 2022-01-10 RX ORDER — METOPROLOL SUCCINATE 50 MG/1
50 TABLET, EXTENDED RELEASE ORAL NIGHTLY
Status: DISCONTINUED | OUTPATIENT
Start: 2022-01-10 | End: 2022-01-11 | Stop reason: HOSPADM

## 2022-01-10 RX ORDER — TRANEXAMIC ACID 100 MG/ML
1000 INJECTION, SOLUTION INTRAVENOUS
Status: COMPLETED | OUTPATIENT
Start: 2022-01-10 | End: 2022-01-10

## 2022-01-10 RX ORDER — ACETAMINOPHEN 325 MG/1
650 TABLET ORAL EVERY 4 HOURS PRN
Status: DISCONTINUED | OUTPATIENT
Start: 2022-01-10 | End: 2022-01-11 | Stop reason: HOSPADM

## 2022-01-10 RX ORDER — HYDROMORPHONE HYDROCHLORIDE 2 MG/ML
0.2 INJECTION, SOLUTION INTRAMUSCULAR; INTRAVENOUS; SUBCUTANEOUS EVERY 5 MIN PRN
Status: DISCONTINUED | OUTPATIENT
Start: 2022-01-10 | End: 2022-01-10 | Stop reason: HOSPADM

## 2022-01-10 RX ORDER — FUROSEMIDE 40 MG/1
40 TABLET ORAL DAILY
Status: DISCONTINUED | OUTPATIENT
Start: 2022-01-11 | End: 2022-01-11 | Stop reason: HOSPADM

## 2022-01-10 RX ORDER — MIDAZOLAM HYDROCHLORIDE 1 MG/ML
2 INJECTION INTRAMUSCULAR; INTRAVENOUS ONCE
Status: COMPLETED | OUTPATIENT
Start: 2022-01-10 | End: 2022-01-10

## 2022-01-10 RX ORDER — ONDANSETRON 8 MG/1
8 TABLET, ORALLY DISINTEGRATING ORAL EVERY 8 HOURS PRN
Status: DISCONTINUED | OUTPATIENT
Start: 2022-01-10 | End: 2022-01-11 | Stop reason: HOSPADM

## 2022-01-10 RX ORDER — INSULIN ASPART 100 [IU]/ML
1-10 INJECTION, SOLUTION INTRAVENOUS; SUBCUTANEOUS
Status: DISCONTINUED | OUTPATIENT
Start: 2022-01-10 | End: 2022-01-11 | Stop reason: HOSPADM

## 2022-01-10 RX ORDER — AMLODIPINE BESYLATE 2.5 MG/1
2.5 TABLET ORAL NIGHTLY
Status: DISCONTINUED | OUTPATIENT
Start: 2022-01-10 | End: 2022-01-11 | Stop reason: HOSPADM

## 2022-01-10 RX ORDER — SODIUM CHLORIDE 0.9 % (FLUSH) 0.9 %
10 SYRINGE (ML) INJECTION
Status: DISCONTINUED | OUTPATIENT
Start: 2022-01-10 | End: 2022-01-11 | Stop reason: HOSPADM

## 2022-01-10 RX ORDER — TRAZODONE HYDROCHLORIDE 100 MG/1
200 TABLET ORAL NIGHTLY
Status: DISCONTINUED | OUTPATIENT
Start: 2022-01-10 | End: 2022-01-11 | Stop reason: HOSPADM

## 2022-01-10 RX ORDER — SERTRALINE HYDROCHLORIDE 50 MG/1
100 TABLET, FILM COATED ORAL NIGHTLY
Status: DISCONTINUED | OUTPATIENT
Start: 2022-01-10 | End: 2022-01-11 | Stop reason: HOSPADM

## 2022-01-10 RX ORDER — CEFAZOLIN SODIUM 2 G/50ML
2 SOLUTION INTRAVENOUS
Status: COMPLETED | OUTPATIENT
Start: 2022-01-10 | End: 2022-01-11

## 2022-01-10 RX ORDER — MIDAZOLAM HYDROCHLORIDE 1 MG/ML
INJECTION, SOLUTION INTRAMUSCULAR; INTRAVENOUS
Status: DISCONTINUED | OUTPATIENT
Start: 2022-01-10 | End: 2022-01-10

## 2022-01-10 RX ORDER — IBUPROFEN 200 MG
24 TABLET ORAL
Status: DISCONTINUED | OUTPATIENT
Start: 2022-01-10 | End: 2022-01-11 | Stop reason: HOSPADM

## 2022-01-10 RX ORDER — OXYCODONE HYDROCHLORIDE 5 MG/1
5 TABLET ORAL EVERY 4 HOURS PRN
Status: DISCONTINUED | OUTPATIENT
Start: 2022-01-10 | End: 2022-01-11 | Stop reason: HOSPADM

## 2022-01-10 RX ORDER — SODIUM CHLORIDE 9 MG/ML
INJECTION, SOLUTION INTRAVENOUS CONTINUOUS
Status: DISCONTINUED | OUTPATIENT
Start: 2022-01-10 | End: 2022-01-10

## 2022-01-10 RX ORDER — TRAMADOL HYDROCHLORIDE 50 MG/1
50 TABLET ORAL
Qty: 36 TABLET | Refills: 0 | Status: SHIPPED | OUTPATIENT
Start: 2022-01-10 | End: 2022-02-15

## 2022-01-10 RX ORDER — SUCCINYLCHOLINE CHLORIDE 20 MG/ML
INJECTION INTRAMUSCULAR; INTRAVENOUS
Status: DISCONTINUED | OUTPATIENT
Start: 2022-01-10 | End: 2022-01-10

## 2022-01-10 RX ORDER — KETOROLAC TROMETHAMINE 30 MG/ML
15 INJECTION, SOLUTION INTRAMUSCULAR; INTRAVENOUS EVERY 8 HOURS PRN
Status: DISCONTINUED | OUTPATIENT
Start: 2022-01-10 | End: 2022-01-10 | Stop reason: HOSPADM

## 2022-01-10 RX ORDER — ROCURONIUM BROMIDE 10 MG/ML
INJECTION, SOLUTION INTRAVENOUS
Status: DISCONTINUED | OUTPATIENT
Start: 2022-01-10 | End: 2022-01-10

## 2022-01-10 RX ORDER — ACETAMINOPHEN 500 MG
1000 TABLET ORAL EVERY 8 HOURS PRN
Qty: 60 TABLET | Refills: 0 | Status: SHIPPED | OUTPATIENT
Start: 2022-01-10 | End: 2022-10-17

## 2022-01-10 RX ORDER — FAMOTIDINE 20 MG/1
20 TABLET, FILM COATED ORAL 2 TIMES DAILY
Status: DISCONTINUED | OUTPATIENT
Start: 2022-01-10 | End: 2022-01-11 | Stop reason: HOSPADM

## 2022-01-10 RX ORDER — PHENYLEPHRINE HYDROCHLORIDE 10 MG/ML
INJECTION INTRAVENOUS
Status: DISCONTINUED | OUTPATIENT
Start: 2022-01-10 | End: 2022-01-10

## 2022-01-10 RX ORDER — CHLORHEXIDINE GLUCONATE ORAL RINSE 1.2 MG/ML
10 SOLUTION DENTAL
Status: DISCONTINUED | OUTPATIENT
Start: 2022-01-10 | End: 2022-01-10

## 2022-01-10 RX ORDER — FENTANYL CITRATE 50 UG/ML
INJECTION, SOLUTION INTRAMUSCULAR; INTRAVENOUS
Status: DISCONTINUED | OUTPATIENT
Start: 2022-01-10 | End: 2022-01-10

## 2022-01-10 RX ORDER — ALBUTEROL SULFATE 0.83 MG/ML
2.5 SOLUTION RESPIRATORY (INHALATION) ONCE
Status: COMPLETED | OUTPATIENT
Start: 2022-01-10 | End: 2022-01-10

## 2022-01-10 RX ADMIN — OXYCODONE HYDROCHLORIDE AND ACETAMINOPHEN 1 TABLET: 5; 325 TABLET ORAL at 05:01

## 2022-01-10 RX ADMIN — SODIUM CHLORIDE, POTASSIUM CHLORIDE, SODIUM LACTATE AND CALCIUM CHLORIDE: 600; 310; 30; 20 INJECTION, SOLUTION INTRAVENOUS at 03:01

## 2022-01-10 RX ADMIN — DEXAMETHASONE SODIUM PHOSPHATE 8 MG: 4 INJECTION, SOLUTION INTRAMUSCULAR; INTRAVENOUS at 03:01

## 2022-01-10 RX ADMIN — GLYCOPYRROLATE 0.2 MG: 0.2 INJECTION, SOLUTION INTRAMUSCULAR; INTRAVENOUS at 02:01

## 2022-01-10 RX ADMIN — KETOROLAC TROMETHAMINE 15 MG: 30 INJECTION, SOLUTION INTRAMUSCULAR at 05:01

## 2022-01-10 RX ADMIN — TRAZODONE HYDROCHLORIDE 200 MG: 100 TABLET ORAL at 08:01

## 2022-01-10 RX ADMIN — PROPOFOL 150 MG: 10 INJECTION, EMULSION INTRAVENOUS at 01:01

## 2022-01-10 RX ADMIN — FENTANYL CITRATE 100 MCG: 50 INJECTION, SOLUTION INTRAMUSCULAR; INTRAVENOUS at 01:01

## 2022-01-10 RX ADMIN — CHLORHEXIDINE GLUCONATE 0.12% ORAL RINSE 10 ML: 1.2 LIQUID ORAL at 08:01

## 2022-01-10 RX ADMIN — LIDOCAINE HYDROCHLORIDE 100 MG: 20 INJECTION, SOLUTION INTRAVENOUS at 01:01

## 2022-01-10 RX ADMIN — FAMOTIDINE 20 MG: 20 TABLET ORAL at 08:01

## 2022-01-10 RX ADMIN — CEFAZOLIN SODIUM 2 G: 2 SOLUTION INTRAVENOUS at 09:01

## 2022-01-10 RX ADMIN — AMLODIPINE BESYLATE 2.5 MG: 2.5 TABLET ORAL at 08:01

## 2022-01-10 RX ADMIN — METOPROLOL SUCCINATE 50 MG: 50 TABLET, EXTENDED RELEASE ORAL at 08:01

## 2022-01-10 RX ADMIN — INSULIN ASPART 2 UNITS: 100 INJECTION, SOLUTION INTRAVENOUS; SUBCUTANEOUS at 11:01

## 2022-01-10 RX ADMIN — MIDAZOLAM 2 MG: 1 INJECTION INTRAMUSCULAR; INTRAVENOUS at 12:01

## 2022-01-10 RX ADMIN — MIDAZOLAM 2 MG: 1 INJECTION INTRAMUSCULAR; INTRAVENOUS at 01:01

## 2022-01-10 RX ADMIN — DOCUSATE SODIUM 100 MG: 100 CAPSULE ORAL at 08:01

## 2022-01-10 RX ADMIN — ROPIVACAINE HYDROCHLORIDE 30 ML: 5 INJECTION, SOLUTION EPIDURAL; INFILTRATION; PERINEURAL at 11:01

## 2022-01-10 RX ADMIN — ONDANSETRON 4 MG: 2 INJECTION, SOLUTION INTRAMUSCULAR; INTRAVENOUS at 03:01

## 2022-01-10 RX ADMIN — CEFAZOLIN SODIUM 2 G: 2 SOLUTION INTRAVENOUS at 01:01

## 2022-01-10 RX ADMIN — ALBUTEROL SULFATE 2.5 MG: 2.5 SOLUTION RESPIRATORY (INHALATION) at 05:01

## 2022-01-10 RX ADMIN — TRANEXAMIC ACID 1000 MG: 100 INJECTION, SOLUTION INTRAVENOUS at 03:01

## 2022-01-10 RX ADMIN — SODIUM CHLORIDE, POTASSIUM CHLORIDE, SODIUM LACTATE AND CALCIUM CHLORIDE: 600; 310; 30; 20 INJECTION, SOLUTION INTRAVENOUS at 01:01

## 2022-01-10 RX ADMIN — SERTRALINE HYDROCHLORIDE 100 MG: 50 TABLET ORAL at 08:01

## 2022-01-10 RX ADMIN — IPRATROPIUM BROMIDE 0.5 MG: 0.5 SOLUTION RESPIRATORY (INHALATION) at 07:01

## 2022-01-10 RX ADMIN — PHENYLEPHRINE HYDROCHLORIDE 200 MCG: 10 INJECTION INTRAVENOUS at 01:01

## 2022-01-10 RX ADMIN — ROCURONIUM BROMIDE 5 MG: 10 INJECTION, SOLUTION INTRAVENOUS at 01:01

## 2022-01-10 RX ADMIN — PHENYLEPHRINE HYDROCHLORIDE 100 MCG: 10 INJECTION INTRAVENOUS at 02:01

## 2022-01-10 RX ADMIN — CHLORHEXIDINE GLUCONATE 0.12% ORAL RINSE 10 ML: 1.2 LIQUID ORAL at 11:01

## 2022-01-10 RX ADMIN — SUCCINYLCHOLINE CHLORIDE 140 MG: 20 INJECTION, SOLUTION INTRAMUSCULAR; INTRAVENOUS at 01:01

## 2022-01-10 RX ADMIN — GABAPENTIN 800 MG: 400 CAPSULE ORAL at 08:01

## 2022-01-10 RX ADMIN — TRANEXAMIC ACID 1000 MG: 100 INJECTION, SOLUTION INTRAVENOUS at 01:01

## 2022-01-10 NOTE — ANESTHESIA PREPROCEDURE EVALUATION
01/10/2022  Crow Green is a 66 y.o., male.  Patient Active Problem List   Diagnosis    ED (erectile dysfunction)    Non-proliferative diabetic retinopathy, mild, both eyes    Essential hypertension    Diabetic polyneuropathy    Nonobstructive coronary artery disease of native artery of native heart    Chronic combined systolic and diastolic HFrEF of 45%    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, with long-term current use of insulin    SANDRITA on CPAP    Moderate asthma    Psychophysiological insomnia    Nightmares    Sleep related gastroesophageal reflux disease    Inadequate sleep hygiene    PAF (paroxysmal atrial fibrillation)    At risk for medication noncompliance    Obesity (BMI 30.0-34.9)    Indeterminate stage chronic open angle glaucoma    Right hip pain    Preop cardiovascular exam    Gait instability    Chronic right shoulder pain    Arthritis    Left sided sciatica    Dyspnea on exertion    Osteoarthritis of spine with radiculopathy, lumbar region    HNP (herniated nucleus pulposus), lumbar    Gastroesophageal reflux disease without esophagitis    Chronic diastolic heart failure    Hypogonadism in male    Disorder of parathyroid gland    Hyperlipidemia associated with type 2 diabetes mellitus    LRTI (lower respiratory tract infection)    Traumatic tear of left rotator cuff    Other chronic pain    Left shoulder pain    Complete tear of left rotator cuff    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 associated with type 2 diabetes mellitus    Lumbar radiculopathy    Diabetic ulcer of toe of left foot associated with type 2 diabetes mellitus, with necrosis of muscle    Ulcer of left foot    Elevated troponin    Hypotension due to medication     Dermatomyositis    Postprocedural hypotension    Advanced directives, counseling/discussion    Cocaine abuse with intoxication    Schizoaffective disorder, depressive type    Chronic obstructive pulmonary disease    MDD (major depressive disorder), recurrent episode, moderate    DOMINIK (generalized anxiety disorder)    Bilateral carpal tunnel syndrome    Rotator cuff tear arthropathy of left shoulder    Chronic left shoulder pain    Sacroiliac dysfunction    Generalized weakness    Atherosclerosis of native coronary artery of native heart with unstable angina pectoris    Coronary vasospasm    Cocaine abuse    Osteopenia    Anemia    Atherosclerosis of aorta    Cardiac asthma    Pre-operative respiratory examination     Past Surgical History:   Procedure Laterality Date    ABLATION OF ARRHYTHMOGENIC FOCUS FOR ATRIAL FIBRILLATION N/A 2/27/2020    Procedure: Ablation atrial fibrillation;  Surgeon: Gio Brown MD;  Location: [facility] EP LAB;  Service: Cardiology;  Laterality: N/A;  afib, DAMIEN (cx if SR), PVI, PITO, anes, MB, 3 Prep    CHOLECYSTECTOMY      CLOSURE OF WOUND Left 7/1/2020    Procedure: CLOSURE, WOUND;  Surgeon: Barb Veras DPM;  Location: [facility] OR;  Service: Podiatry;  Laterality: Left;    INJECTION OF ANESTHETIC AGENT INTO SACROILIAC JOINT Left 3/24/2021    Procedure: Left BLOCK, SACROILIAC JOINT and bilateral rhomboid TPI;  Surgeon: Dillan Tsai MD;  Location: [facility] PAIN MGT;  Service: Pain Management;  Laterality: Left;    SELECTIVE INJECTION OF ANESTHETIC AGENT AROUND LUMBAR SPINAL NERVE ROOT BY TRANSFORAMINAL APPROACH Left 6/11/2020    Procedure: BLOCK, SPINAL NERVE ROOT, LUMBAR, SELECTIVE, TRANSFORAMINAL APPROACH Left L4-5, L5-S1 TFESI with RN IV sedation;  Surgeon: Dillan Tsai MD;  Location: [facility] PAIN MGT;  Service: Pain Management;  Laterality: Left;    TOE AMPUTATION Left 6/29/2020    Procedure: AMPUTATION, TOE;  Surgeon: Barb Veras DPM;  Location: [facility] OR;   Service: Podiatry;  Laterality: Left;  great toe       Anesthesia Evaluation    I have reviewed the Patient Summary Reports.    I have reviewed the Nursing Notes. I have reviewed the NPO Status.   I have reviewed the Medications.     Review of Systems  Anesthesia Hx:  No problems with previous Anesthesia    Social:  Non-Smoker Hx Cocaine abuse   Hematology/Oncology:  Hematology Normal        Cardiovascular:   Hypertension Past MI CAD  Dysrhythmias atrial fibrillation Angina CHF VARGAS ECG has been reviewed.    Pulmonary:   COPD Asthma Shortness of breath Sleep Apnea, CPAP    Renal/:  Renal/ Normal     Hepatic/GI:   GERD    Musculoskeletal:   Arthritis     Neurological:   Diabetic polyneuropathy  Lumbar radiculopathy  Peripheral Neuropathy    Endocrine:   Diabetes    Psych:   Schizoaffective disorder, depressive type         Physical Exam  General:  Well nourished, Obesity    Airway/Jaw/Neck:  Airway Findings: Mouth Opening: Normal Tongue: Normal  General Airway Assessment: Adult  Mallampati: II  TM Distance: 4 - 6 cm  Jaw/Neck Findings:  Neck ROM: Normal ROM      Dental:  Dental Findings: In tact   Chest/Lungs:  Chest/Lungs Findings: Clear to auscultation, Normal Respiratory Rate     Heart/Vascular:  Heart Findings: Rate: Normal  Rhythm: Regular Rhythm  Sounds: Normal        Mental Status:  Mental Status Findings:  Cooperative, Alert and Oriented         Anesthesia Plan  Type of Anesthesia, risks & benefits discussed:  Anesthesia Type:  general, regional    Patient's Preference:   Plan Factors:          Intra-op Monitoring Plan: standard ASA monitors  Intra-op Monitoring Plan Comments:   Post Op Pain Control Plan: multimodal analgesia and per primary service following discharge from PACU  Post Op Pain Control Plan Comments:     Induction:   IV  Beta Blocker:  Patient is on a Beta-Blocker and has received one dose within the past 24 hours (No further documentation required).       Informed Consent: Patient  understands risks and agrees with Anesthesia plan.  Questions answered. Anesthesia consent signed with patient.  ASA Score: 3     Day of Surgery Review of History & Physical:  There are no significant changes.          Ready For Surgery From Anesthesia Perspective.         Chemistry        Component Value Date/Time     12/30/2021 0813    K 4.1 12/30/2021 0813     12/30/2021 0813    CO2 25 12/30/2021 0813    BUN 15 12/30/2021 0813    CREATININE 1.6 (H) 12/30/2021 0813     (H) 12/30/2021 0813        Component Value Date/Time    CALCIUM 9.2 12/30/2021 0813    ALKPHOS 86 12/30/2021 0813    AST 22 12/30/2021 0813    ALT 22 12/30/2021 0813    BILITOT 0.2 12/30/2021 0813    ESTGFRAFRICA 52 (A) 12/30/2021 0813    EGFRNONAA 45 (A) 12/30/2021 0813        Lab Results   Component Value Date    WBC 8.04 12/30/2021    HGB 10.0 (L) 12/30/2021    HCT 33.8 (L) 12/30/2021    MCV 81 (L) 12/30/2021     12/30/2021     Sinus rhythm with Premature supraventricular complexes   Incomplete right bundle branch block   Left anterior fascicular block   Minimal voltage criteria for LVH, may be normal variant ( Evans product )   Nonspecific ST and T wave abnormality   When compared with ECG of 17-JUN-2021 13:26,   Premature supraventricular complexes are now Present   Criteria for Anteroseptal infarct are no longer Present   Confirmed by MARTA VERAS, RUSH COHEN ([id number]) on 8/4/2021 5:17:49 AM       Echo 5/25/21:  · The left ventricle is normal in size with concentric hypertrophy and mildly decreased systolic function.  · The estimated ejection fraction is 40%.  · Grade I left ventricular diastolic dysfunction.  · Normal right ventricular size with normal right ventricular systolic function.  · Normal central venous pressure (3 mmHg).    Nuclear Stress 5/25/21:   Equivocal myocardial perfusion scan.    There is a mild intensity, small sized, fixed defect consistent with scar in the inferoapical wall(s).    The gated  perfusion images showed an ejection fraction of 35% at rest. The gated perfusion images showed an ejection fraction of 39% post stress    There is mild global hypokinesis at rest and stress.

## 2022-01-10 NOTE — ANESTHESIA PROCEDURE NOTES
Intubation    Date/Time: 1/10/2022 1:30 PM  Performed by: Louis Whitley CRNA  Authorized by: Allison Ruiz MD     Intubation:     Induction:  Intravenous    Intubated:  Postinduction    Mask Ventilation:  Easy with oral airway    Attempts:  2    Attempted By:  CRNA    Method of Intubation:  Direct    Blade:  Perez 2    Laryngeal View Grade: Grade III - only epiglottis visible      Attempted By (2nd Attempt):  CRNA    Method of Intubation (2nd Attempt):  Video laryngoscopy    Blade (2nd Attempt):  Iqbal 4    Laryngeal View Grade (2nd Attempt): Grade I - full view of cords      Difficult Airway Encountered?: No      Complications:  None    Airway Device:  Oral endotracheal tube    Airway Device Size:  7.5    Style/Cuff Inflation:  Cuffed (inflated to minimal occlusive pressure)    Tube secured:  21    Secured at:  The lips    Placement Verified By:  Capnometry    Complicating Factors:  Obesity, short neck, poor neck/head extension and oropharyngeal edema or fat    Findings Post-Intubation:  BS equal bilateral and atraumatic/condition of teeth unchanged

## 2022-01-10 NOTE — PATIENT INSTRUCTIONS
TOTAL SHOULDER ARTHROPLASTY    Contact the [facility] at [phone] if you have questions about your instructions or follow-up appointment.    DIET:   Start with clear liquids and light foods to minimize nausea. Once these are tolerated, advance to a regular diet.     DRESSING AND WOUND CARE:   Keep the dressing clean and dry. It is normal for there to be some drainage after surgery since the shoulder was irrigated with large amounts of fluid. Reinforce with additional gauze as necessary.  Remove the dressing the 7th day after surgery and begin changing daily with clean gauze or Band-Aids®. Keep your incisions covered until you follow up in clinic.   If you have Steri-Strips in place of stitches, allow them to stay in place as long as possible. Steri-Strips are made of a fabric material that can get wet in the shower and pat dry with a towel. They usually fall off on their own within 7 to 10 days. You may trim the edges as they begin to curl.      BATHING:   You may bathe or shower on the 7th day after surgery, but do not scrub or soak the incisions. Dry the area by gently blotting it with a gauze or towel. After it is completely dry, cover the wound with clean gauze or Band-Aids®. Do NOT submerge the incisions (bath/swim) until after the sutures are removed and the wound has completely healed.     ACTIVITY:    Ice should be applied to the shoulder for 20-30 minutes, 5-6 times a day, to help control pain and swelling. Apply additional times as needed, especially after exercise, for the first 3-4 weeks. Do not apply ice directly to the skin; use a thin barrier in between. Also, do not use heat.    Elevate the shoulder by sleeping as upright as possible using extra pillows or a recliner. Do this for the first few days to help decrease pain and swelling.   Wear the sling at all times for 6 weeks including while sleeping. The only time you may remove the sling is for bathing and exercises. Do not  lean or put your body weight on your arm.   When your block wears off, start the following exercises:  Remove the sling for 5-10 minutes, 3 times a day, to do the following exercises:  1. Fully bend & straighten your fingers, your wrist, and your elbow several times.   2. Lean forward, bracing yourself on a table/counter with your normal arm. Let your surgical arm relax and hang straight down. Shift your weight so that your arm moves side to side, front to back, and in gentle circles like a pendulum or elephant's trunk. Use your body to generate the movement for this, NOT your surgical shoulder's muscles. (see drawing below)     Physical therapy should be started within 3 days of surgery. Take your therapy prescription to the PT clinic of your choice. If you have not received a therapy prescription, please contact your surgeon's office to have a script sent to your physical therapist.     PAIN CONTROL:    It is important to stay ahead of pain as it becomes challenging to get under control if you fall behind. Ice and elevation can help and should be used as much as possible in the first few days.   Narcotic pain medications, such as tramadol and oxycodone, should be taken as prescribed. The Tramadol is intended to be taken first as the primary medicine and then oxycodone taken for breakthrough pain. Wean off as soon as possible. Take these with food to decrease the chances of nausea and vomiting. Do not drink alcohol, drive a vehicle, or use heavy machinery while taking narcotic pain medications.    NSAID medications are used for pain control and to decrease inflammation. You may be prescribed an NSAID such as celebrex. Take as instructed. Other NSAID medications such as ibuprofen, Motrin, Advil, naproxen, or Aleve can be used once you have finished the celebrex, or if a prescription for celebrex was not provided.    Acetaminophen (Tylenol) is an effective over-the-counter pain medication that can be used with  NSAID medications and non-acetaminophen containing narcotics such as plain oxycodone.    ASPIRIN FOR PREVENTION OF BLOOD CLOTS:   You should take 2.5mg of Eliquis twice daily for the first 3 days after surgery. After this, you can restart your normal dose.     CONSTIPATION PREVENTION:   Anesthesia and pain medications, changes in eating and drinking, and less activity can all lead to constipation after surgery. To prevent or reduce constipation, take an over-the-counter stool softener (brands include Colace and Miralax).  Follow the directions on the bottle. Drink plenty of water and eat high fiber foods including whole grains, fresh fruits, vegetables, beans, prunes or prune juice.     PROBLEMS TO REPORT:  1. Persistent bloody drainage that soaks through reinforced dressings.  2. Fever greater than 101F or 38C.   3. Incision that is very red, swollen, draining pus, shows red streaks, or feels hot.  4. Inability to urinate within 8 hours of surgery (a rare effect of the anesthesia).  5. If you develop a rash, generalized itching or swelling from the medications, STOP the medication and call the clinic or the orthopedic surgery resident on call.    Daytime calls should be directed to the Sports Medicine Clinic at [phone].   Night-time and weekend calls should be directed to the [facility] nurse line at [phone].       FREQUENTLY ASKED QUESTIONS     WHAT DAILY ACTIVITIES CAN I DO?  After shoulder surgery, you may do what you feel comfortable doing in the sling.  Do not lift anything with your operative arm or put yourself at risk of falling.    CAN I DRIVE OR RIDE BY CAR/ TRAIN/ PLANE?   You should not drive while using a sling. There are no forced restrictions regarding operating a motor vehicle, however you must always be the  of whether you are able to operate it safely. You should not drive while taking narcotic pain medications. You may ride in a car after surgery as needed. You may take  a train or even fly the day after your surgery as long as you feel secure and comfortable.    WHAT ABOUT WORK?   You may return to an office-type job or to school whenever comfortable. For most patients this occurs 1-2 weeks after surgery. For more active jobs that require some lifting, you can wait until after your follow-up appointment. Any other unusual types of jobs should be discussed to determine a date for return to work.    WHAT ABOUT SWELLING?   Expect swelling as a normal process after surgery. Ice, elevation, and other treatments provided at physical therapy will allow this to improve in time. Some swelling may remain for up to 8 weeks, and this is normal.     WHAT IF IT REALLY HURTS TOO MUCH?   Surgery hurts and you cannot expect to be pain free, but our goal is for it to be tolerable. Try to use all available pain therapies such as narcotics, NSAIDS, and acetaminophen. Always try more ice and elevation. If the pain is not tolerable, call the clinic or the after hours nursing line.

## 2022-01-10 NOTE — ANESTHESIA POSTPROCEDURE EVALUATION
Anesthesia Post Evaluation    Patient: Crow Green    Procedure(s) Performed: Procedure(s) (LRB):  ARTHROPLASTY, SHOULDER, TOTAL, REVERSE (Left)    Final Anesthesia Type: general      Patient location during evaluation: PACU  Patient participation: Yes- Able to Participate  Level of consciousness: awake  Post-procedure vital signs: reviewed and stable  Pain management: adequate  Airway patency: patent    PONV status at discharge: No PONV  Anesthetic complications: no      Cardiovascular status: blood pressure returned to baseline and hemodynamically stable  Respiratory status: unassisted and spontaneous ventilation  Hydration status: euvolemic  Follow-up not needed.          Vitals Value Taken Time   /88 01/10/22 1205   Temp 36.7 °C (98.1 °F) 01/10/22 1205   Pulse 82 01/10/22 1205   Resp 20 01/10/22 1205   SpO2 100 % 01/10/22 1205         No case tracking events are documented in the log.      Pain/Raymond Score: No data recorded

## 2022-01-10 NOTE — ANESTHESIA PROCEDURE NOTES
Peripheral Block    Patient location during procedure: pre-op   Block not for primary anesthetic.  Reason for block: at surgeon's request and post-op pain management   Post-op Pain Location: left shoulder  Start time: 1/10/2022 11:50 AM  Timeout: 1/10/2022 11:47 AM   End time: 1/10/2022 11:54 AM    Staffing  Authorizing Provider: Gio Rosas II, MD  Performing Provider: Gio Rosas II, MD    Preanesthetic Checklist  Completed: patient identified, IV checked, site marked, risks and benefits discussed, surgical consent, monitors and equipment checked, pre-op evaluation and timeout performed  Peripheral Block  Patient position: supine  Prep: ChloraPrep  Patient monitoring: heart rate, cardiac monitor, continuous pulse ox, continuous capnometry and frequent blood pressure checks  Block type: supraclavicular  Laterality: left  Injection technique: single shot  Needle  Needle type: Stimuplex   Needle gauge: 22 G  Needle length: 4 in  Needle localization: anatomical landmarks and ultrasound guidance   -ultrasound image captured on disc.  Assessment  Injection assessment: negative aspiration, negative parasthesia and local visualized surrounding nerve  Paresthesia pain: none  Heart rate change: no  Slow fractionated injection: yes  Pain Tolerance: comfortable throughout block and no complaints  Medications:  Medication Administration Time: 1/10/2022 11:54 AM  Medications: ropivacaine (NAROPIN) injection 0.5%, 30 mL    Medications:  Bolus administered: 30 mL of 0.5 ropivacaine  Epinephrine added: 3.75 mcg/mL (1/300,000)  Additional Notes  VSS.  [facility] RN monitoring vitals throughout procedure.  Patient tolerated procedure well.

## 2022-01-10 NOTE — TRANSFER OF CARE
"Anesthesia Transfer of Care Note    Patient: Crow Green    Procedure(s) Performed: Procedure(s) (LRB):  ARTHROPLASTY, SHOULDER, TOTAL, REVERSE (Left)    Patient location: PACU    Anesthesia Type: general    Transport from OR: Transported from OR on room air with adequate spontaneous ventilation    Post pain: adequate analgesia    Post assessment: no apparent anesthetic complications and tolerated procedure well    Post vital signs: stable    Level of consciousness: awake    Nausea/Vomiting: no nausea/vomiting    Complications: none    Transfer of care protocol was followed      Last vitals:   Visit Vitals  BP (!) 151/88 (BP Location: Right arm, Patient Position: Lying)   Pulse 82   Temp 36.7 °C (98.1 °F) (Tympanic)   Resp 20   Ht 5' 5" (1.651 m)   Wt 86 kg (189 lb 9.5 oz)   SpO2 100%   BMI 31.55 kg/m²     "

## 2022-01-10 NOTE — OP NOTE
Patient Name: Crow Green    Date of Surgery: 1/10/2022    Medical Record #: [id number]     Pre-operative Diagnosis:  Left Shoulder Rotator Cuff Arthropathy    Post-operative Diagnosis:  Left Shoulder Rotator Cuff Arthropathy    Procedure:  Left Reverse Total Shoulder Arthroplasty     Primary Surgeon: Anthony Alvarado MD    Assistant: SMA Aaron    Implants Utilized:    Implant Name Type Inv. Item Serial No.  Lot No. LRB No. Used Action   SCREW BONE RSP 6.5X26 GLENOID - [id number]  SCREW BONE RSP 6.5X26 GLENOID  DJO [id number] Left 1 Implanted   SCREW BONE RSP 6.5X18 GLENOID - [id number]  SCREW BONE RSP 6.5X18 GLENOID  DJO [id number] Left 1 Implanted   BASEPLATE GLENOID REV RSP P2 - [id number]  BASEPLATE GLENOID REV RSP P2  DJO [id number] Left 1 Implanted   SCREW BONE RSP 5 X 22MM TITAN - [id number]  SCREW BONE RSP 5 X 22MM TITAN  DJO [id number] Left 1 Implanted   SCREW BONE RSP 5 X 14M GLENOID - [id number]  SCREW BONE RSP 5 X 14M GLENOID  DJO [id number] Left 1 Implanted   Glenoid Head w/ Retaining Screw    DJO SURGICAL [id number] Left 1 Implanted   Humeral Stem, Small Shell, 10mm x 48mm SHORT    DJO SURGICAL [id number] Left 1 Implanted   Small Socket insert 36mm Neutral, e-plus     [id number] Left 1 Implanted         Anesthesia:  General endotracheal anesthetic with accompanying regional block.    Complications:  None    Estimated Blood Loss: 250 mL    Indications for Procedure:   Crow Green is a 66 y.o. year old male who presents at this time with recalcitrant pain and dysfunction secondary to rotator cuff tear arthropathy.  he has persistent pain and activity derived symptoms with deterioration in function and limitations in quality of life.  he has failed conservative options at this time and presents today for elective reverse total shoulder replacement.    Description of Procedure:  This patient was taken to the operating room and placed in a supine position on the operating table.  The  patient was correctly identified upon entry and the operative site had been marked by the operating surgeon, and this was verified.  Intravenous antibiotics were administered prior to skin incision.  At this time general endotracheal anesthesia was initiated by the attending anesthesiologist and after successful induction of anesthetic, the patient was then positioned in the modified upright beach chair position.  The head was placed at neutral, all bony prominences were well padded, and the arm was supported in an articulated arm positioner.  After timeout was called the operative extremity was correctly identified by the operating surgeon and was subsequently sterilely prepped and draped in the usual surgical fashion and the procedure was then commenced.    A delto-pectoral approach was carried out in the standard fashion.  After an oblique skin incision was made, dissection was carried down to the deep tissues.  Hemostasis was achieved.  The deltopectoral interval was identified and the cephalic vein was mobilized laterally and protected.  The deltoid and pectoralis major muscles were mobilized and a deep self-retaining retractor was placed.  Adhesions were released up into the subacromial space and the rotator cuff tissue was visualized and found to be deficient.  The conjoint tendon was mobilized medially and protected throughout the case.  The axillary nerve was identified and also protected throughout the procedure. A subscapularis peel was performed to release the residual anterior subscap and anterior capsule with release around the inferior humeral neck with care being taken to avoid damage to surrounding structures.  Osteophytes were then removed circumferentially from the humeral head.  The humerus was then positioned for preparation and retractors were placed circumferentially.  A provisional humeral cut was made using an extramedullary guide and a protective plate put onto the humeral surface.     The  glenoid was circumferentially exposed in the usual fashion.  The subscapularis remnant was mobilized with release of the rotator interval.  Any anterior adhesions as well as deep adhesions to the anterior capsule were incised inferiorly to approximately the apex of the glenoid.  Retractors were placed and the humerus was carefully retracted posteroinferiorly. Direct en face exposure of the glenoid was achieved and glenoid preparation was carried out. A guidepin was placed into the apex of the glenoid vault with a startpoint to allow inferior positioning of the baseplate and approximately 10 degrees of inferior tilt. The glenoid face was reamed to a flat surface with minimal medialization. We measured the screw depth.  We then tapped for the central screw.  The baseplate/screw construct was then assembled on the back table and screwed into the glenoid with excellent purchase.  Four peripheral locking screws were drilled and placed.  A lateralized glenosphere was then impacted over the baseplate and a securing screw placed.    We then returned to the humerus and reamed for our inlay humeral tray. The humeral canal was then entered through the superior head and medullary reaming was carried out up to 10 mm at which point appropriate chatter was noted. Next broaching was carried out with the final broach left in place. The humerus was visualized and a standard tray was trialed. Appropriate soft tissue tensioning was noted, with care being taken to achieve optimal deltoid tension, stability, and range of motion. The humeral trials and broach were then removed and the appropriate sized humeral stem was impacted into place. The final implantable tray was then positioned and impacted into place. The joint was reduced and a final check was carried out to confirm satisfactory deltoid tension, stability, and ROM. The axillary nerve was palpated and intact. Satisfied with our arthroplasty, closure was then carried out.    The  residual subscapularis tissue was repaired with #2 FiberWire through the holes in the humeral tray. After copious irrigation we proceeded with superficial closure.  A layered closure was carried out utilizing #1 Ethibond sutures to re-approximate the delto-pectoral interval followed by 0 and 2-0 Vicryl sutures in a layered fashion for the skin and a sub-cuticular 3-0 prolene suture for final closure.  Steri-strips were placed and a sterile dressing was placed subsequently.  The patient was then placed in an ultra-sling device and brought into the supine position where he was successfully awakened from anesthetic, transferred to a gurney, and taken in stable condition to the recovery room.      There were no intraoperative complications. Needles and sponge counts were correct. IV antibiotics were started and completed within 30 min of skin incision.    POSTOPERATIVE PLAN:    Reverse Total Shoulder Arthroplasty protocol  NWB LUE in sling  Ok to be out of sling for pendulums, elbow, wrist ROM  ASA 81mg BID x 2wks for DVT ppx  F/u 10-14 days for wound check/suture removal      Anthony Alvarado

## 2022-01-10 NOTE — INTERVAL H&P NOTE
Anesthesia Pre Eval Note    Anesthesia ROS/Med Hx          Neuro/Psych Review:    Positive for psychiatric history - Depression    Cardiovascular Review:  Positive for CAD  Positive for hypertension  Positive for hyperlipidemia    GI/HEPATIC/RENAL Review:   Positive for liver disease     End/Other Review:  Positive for diabetes - type 2  Additional Results:     ALLERGIES:   -- Chlorpheniramine-Ppa Cr -- Other (See Comments)    --  Hay fever   -- Seasonal -- Other (See Comments)       Lab Results       Component                Value               Date                       WBC                      6.1                 02/18/2020                 RBC                      4.20 (L)            02/18/2020                 HCT                      39.4                02/18/2020                 MCHC                     33.5                02/18/2020                 SODIUM                   139                 02/18/2020                 POTASSIUM                4.1                 02/18/2020                 CHLORIDE                 103                 02/18/2020                 CO2                      25                  02/18/2020                 GLUCOSE                  134 (H)             02/18/2020                 BUN                      10                  02/18/2020                 CALCIUM                  9.6                 02/18/2020                 PLT                      254                 02/18/2020                 PTT                      31                  02/15/2019                 PTT                      NOT APPLICABLE      02/15/2019                 INR                      1.1                 02/15/2019               Past Medical History:  No date: Atherosclerosis  No date: Atrial fibrillation (CMS/HCC)  No date: Depression  No date: Diabetes (CMS/HCC)  No date: Left hand pain      Comment:  contracture with nerve damage  No date: Liver disease      Comment:  fatty liver  No date: Mitral  The patient has been examined and the H&P has been reviewed:    I concur with the findings and no changes have occurred since H&P was written.    Surgery risks, benefits and alternative options discussed and understood by patient/family.          There are no hospital problems to display for this patient.     regurgitation    Past Surgical History:  No date: Cabg, arterial, three  No date: Hand surgery; Left  No date: Left atrial appendage closure  No date: Mitral valve repair  No date: Tooth extraction       Prior to Admission medications :  Medication gabapentin (NEURONTIN) 300 MG capsule, Sig II po TID, Start Date 2/25/20, End Date , Taking? Yes, Authorizing Provider Peyton Saucedo PA-C    Medication montelukast (SINGULAIR) 10 MG tablet, Sig TAKE 1 TABLET BY MOUTH DAILY., Start Date 1/3/20, End Date 1/2/21, Taking? Yes, Authorizing Provider Matthew Hdz MD    Medication doxepin (SINEQUAN) 10 MG capsule, Sig Take 10 mg by mouth nightly as needed. , Start Date 10/9/19, End Date , Taking? Yes, Authorizing Provider Outside Provider    Medication triamcinolone (ARISTOCORT) 0.1 % cream, Sig Apply to rash BID for 2 weeks, Start Date 8/27/18, End Date , Taking? Yes, Authorizing Provider Outside Provider    Medication clindamycin (CLEOCIN T) 1 % topical solution, Sig use thin film on affected area daily, Start Date 6/4/19, End Date , Taking? Yes, Authorizing Provider Outside Provider    Medication cetirizine (ZYRTEC) 10 MG tablet, Sig Take 10 mg by mouth daily., Start Date , End Date , Taking? Yes, Authorizing Provider Outside Provider    Medication atorvastatin (LIPITOR) 40 MG tablet, Sig Take 40 mg by mouth daily., Start Date , End Date , Taking? Yes, Authorizing Provider Outside Provider    Medication potassium chloride (KLOR-CON M) 20 MEQ bo ER tablet, Sig Take 20 mEq by mouth daily., Start Date , End Date , Taking? Yes, Authorizing Provider Outside Provider    Medication metoPROLOL succinate (TOPROL-XL) 50 MG 24 hr tablet, Sig TAKE 1 TABLET DAILY., Start Date , End Date , Taking? Yes, Authorizing Provider Outside Provider    Medication HYDROcodone-acetaminophen (NORCO)  MG per tablet, Sig 1/2-I po q 4 hrs, prn, Start Date 2/25/20, End Date , Taking? , Authorizing Provider Alexei Starkey MD    Medication metFORMIN  (GLUCOPHAGE-XR) 500 MG 24 hr tablet, Sig TAKE 2 TABLETS BY MOUTH WITH MORNING MEAL TAKE 2 TABLETS WITH EVENING MEAL, Start Date 1/3/20, End Date , Taking? , Authorizing Provider Matthew Hdz MD    Medication levocetirizine (XYZAL) 5 MG tablet, Sig , Start Date , End Date , Taking? , Authorizing Provider Outside Provider    Medication cephalexin (KEFLEX) 500 MG capsule, Sig Take 1 capsule by mouth 4 times daily., Start Date 5/23/19, End Date , Taking? , Authorizing Provider Jackson Munoz MD    Medication lisinopril (ZESTRIL) 5 MG tablet, Sig Take 5 mg by mouth daily. , Start Date , End Date , Taking? , Authorizing Provider Outside Provider    Medication dabigatran (PRADAXA) 150 MG Cap, Sig TAKE 1 CAPSULE TWICE DAILY, Start Date , End Date , Taking? , Authorizing Provider Outside Provider            Relevant Problems   No relevant active problems       Anes Physical Exam    Anes Plan

## 2022-01-10 NOTE — ANESTHESIA PROCEDURE NOTES
Arterial    Diagnosis: EF 40% with RTSR    Patient location during procedure: done in OR  Procedure start time: 1/10/2022 1:24 PM  Timeout: 1/10/2022 1:24 PM  Procedure end time: 1/10/2022 1:26 PM    Staffing  Authorizing Provider: Allison Ruiz MD  Performing Provider: Allison Ruiz MD    Anesthesiologist was present at the time of the procedure.    Preanesthetic Checklist  Completed: patient identified, IV checked, risks and benefits discussed, monitors and equipment checked, pre-op evaluation, timeout performed and anesthesia consent givenArterial  Skin Prep: chlorhexidine gluconate  Local Infiltration: none  Orientation: right  Location: radial    Catheter Size: 22 G  Catheter placement by Anatomical landmarks. Heme positive aspiration all ports.Insertion Attempts: 1  Assessment  Dressing: secured with tape and tegaderm  Patient: Tolerated well

## 2022-01-10 NOTE — PLAN OF CARE
Pt resting on stretcher s/p lt reverse shoulder arthroplasty performed under general anethesia. Respirations even and unlabored on room air with O2 sats of 95%. VSS. Will cont to monitor.See flow sheet for detailed assessment.

## 2022-01-11 VITALS
SYSTOLIC BLOOD PRESSURE: 127 MMHG | HEART RATE: 86 BPM | BODY MASS INDEX: 31.59 KG/M2 | DIASTOLIC BLOOD PRESSURE: 79 MMHG | WEIGHT: 189.63 LBS | HEIGHT: 65 IN | TEMPERATURE: 98 F | OXYGEN SATURATION: 96 % | RESPIRATION RATE: 16 BRPM

## 2022-01-11 LAB
LEFT EYE DM RETINOPATHY: POSITIVE
POCT GLUCOSE: 137 MG/DL (ref 70–110)
POCT GLUCOSE: 179 MG/DL (ref 70–110)
POCT GLUCOSE: 230 MG/DL (ref 70–110)
RIGHT EYE DM RETINOPATHY: POSITIVE

## 2022-01-11 PROCEDURE — 97161 PT EVAL LOW COMPLEX 20 MIN: CPT | Mod: HCNC

## 2022-01-11 PROCEDURE — 25000003 PHARM REV CODE 250: Mod: HCNC | Performed by: ORTHOPAEDIC SURGERY

## 2022-01-11 PROCEDURE — 94640 AIRWAY INHALATION TREATMENT: CPT | Mod: HCNC

## 2022-01-11 PROCEDURE — 97530 THERAPEUTIC ACTIVITIES: CPT | Mod: HCNC

## 2022-01-11 PROCEDURE — 27000221 HC OXYGEN, UP TO 24 HOURS: Mod: HCNC

## 2022-01-11 PROCEDURE — 63600175 PHARM REV CODE 636 W HCPCS: Mod: HCNC | Performed by: STUDENT IN AN ORGANIZED HEALTH CARE EDUCATION/TRAINING PROGRAM

## 2022-01-11 PROCEDURE — 97166 OT EVAL MOD COMPLEX 45 MIN: CPT | Mod: HCNC

## 2022-01-11 PROCEDURE — 94761 N-INVAS EAR/PLS OXIMETRY MLT: CPT | Mod: HCNC

## 2022-01-11 PROCEDURE — 25000003 PHARM REV CODE 250: Mod: HCNC | Performed by: STUDENT IN AN ORGANIZED HEALTH CARE EDUCATION/TRAINING PROGRAM

## 2022-01-11 PROCEDURE — 25000242 PHARM REV CODE 250 ALT 637 W/ HCPCS: Mod: HCNC | Performed by: STUDENT IN AN ORGANIZED HEALTH CARE EDUCATION/TRAINING PROGRAM

## 2022-01-11 RX ORDER — GUAIFENESIN/DEXTROMETHORPHAN 100-10MG/5
10 SYRUP ORAL EVERY 4 HOURS PRN
Status: DISCONTINUED | OUTPATIENT
Start: 2022-01-11 | End: 2022-01-11 | Stop reason: HOSPADM

## 2022-01-11 RX ADMIN — FUROSEMIDE 40 MG: 40 TABLET ORAL at 09:01

## 2022-01-11 RX ADMIN — TAMSULOSIN HYDROCHLORIDE 0.4 MG: 0.4 CAPSULE ORAL at 09:01

## 2022-01-11 RX ADMIN — AZATHIOPRINE 50 MG: 50 TABLET ORAL at 09:01

## 2022-01-11 RX ADMIN — DOCUSATE SODIUM 100 MG: 100 CAPSULE ORAL at 09:01

## 2022-01-11 RX ADMIN — INSULIN ASPART 4 UNITS: 100 INJECTION, SOLUTION INTRAVENOUS; SUBCUTANEOUS at 11:01

## 2022-01-11 RX ADMIN — APIXABAN 2.5 MG: 2.5 TABLET, FILM COATED ORAL at 09:01

## 2022-01-11 RX ADMIN — FAMOTIDINE 20 MG: 20 TABLET ORAL at 09:01

## 2022-01-11 RX ADMIN — IPRATROPIUM BROMIDE 0.5 MG: 0.5 SOLUTION RESPIRATORY (INHALATION) at 07:01

## 2022-01-11 RX ADMIN — GABAPENTIN 800 MG: 400 CAPSULE ORAL at 02:01

## 2022-01-11 RX ADMIN — GUAIFENESIN AND DEXTROMETHORPHAN 10 ML: 100; 10 SYRUP ORAL at 03:01

## 2022-01-11 RX ADMIN — BACLOFEN 10 MG: 10 TABLET ORAL at 09:01

## 2022-01-11 RX ADMIN — OXYCODONE HYDROCHLORIDE 10 MG: 5 TABLET ORAL at 10:01

## 2022-01-11 RX ADMIN — CHLORHEXIDINE GLUCONATE 0.12% ORAL RINSE 10 ML: 1.2 LIQUID ORAL at 09:01

## 2022-01-11 RX ADMIN — GABAPENTIN 800 MG: 400 CAPSULE ORAL at 09:01

## 2022-01-11 RX ADMIN — LISINOPRIL 10 MG: 10 TABLET ORAL at 09:01

## 2022-01-11 RX ADMIN — IPRATROPIUM BROMIDE 0.5 MG: 0.5 SOLUTION RESPIRATORY (INHALATION) at 01:01

## 2022-01-11 RX ADMIN — OXYCODONE HYDROCHLORIDE 5 MG: 5 TABLET ORAL at 12:01

## 2022-01-11 RX ADMIN — OXYCODONE HYDROCHLORIDE 10 MG: 5 TABLET ORAL at 05:01

## 2022-01-11 RX ADMIN — OXYCODONE HYDROCHLORIDE 10 MG: 5 TABLET ORAL at 02:01

## 2022-01-11 RX ADMIN — CEFAZOLIN SODIUM 2 G: 2 SOLUTION INTRAVENOUS at 05:01

## 2022-01-11 NOTE — PROGRESS NOTES
[facility] S[facility]  Orthopedics  Progress Note    Patient Name: Crow Green  MRN: [id number]  Admission Date: 1/10/2022  Hospital Length of Stay: 0 days  Attending Provider: Anthony Alvarado,*  Primary Care Provider: Mateo Lambert DO  Follow-up For: Procedure(s) (LRB):  ARTHROPLASTY, SHOULDER, TOTAL, REVERSE (Left)    Post-Operative Day: 1 Day Post-Op  Subjective:     Principal Problem: Left reverse Total shoulder arthroplasty    Principal Orthopedic Problem: Left reverse Total shoulder arthroplasty    Interval History: He was admitted for extended recovery after left reverse total shoulder arthroplasty. Pain was reasonably controlled on oral medications overnight.     Review of patient's allergies indicates:   Allergen Reactions    Protamine Hives     Urticaria, possible upper airway swelling       Current Facility-Administered Medications   Medication    acetaminophen tablet 650 mg    amLODIPine tablet 2.5 mg    apixaban tablet 2.5 mg    azaTHIOprine tablet 50 mg    baclofen tablet 10 mg    chlorhexidine 0.12 % solution 10 mL    dextromethorphan-guaiFENesin  mg/5 ml liquid 10 mL    dextrose 50% injection 12.5 g    dextrose 50% injection 25 g    docusate sodium capsule 100 mg    famotidine tablet 20 mg    furosemide tablet 40 mg    gabapentin capsule 800 mg    glucagon (human recombinant) injection 1 mg    glucose chewable tablet 16 g    glucose chewable tablet 24 g    insulin aspart U-100 pen 1-10 Units    ipratropium 0.02 % nebulizer solution 0.5 mg    lisinopriL tablet 10 mg    metoprolol succinate (TOPROL-XL) 24 hr tablet 50 mg    ondansetron disintegrating tablet 8 mg    oxyCODONE immediate release tablet 10 mg    oxyCODONE immediate release tablet 5 mg    sertraline tablet 100 mg    sodium chloride 0.9% flush 10 mL    tamsulosin 24 hr capsule 0.4 mg    traZODone tablet 200 mg     Objective:     Vital Signs (Most Recent):  Temp: 98.3 °F (36.8 °C) (01/11/22  "0741)  Pulse: 78 (01/11/22 0741)  Resp: 16 (01/11/22 0741)  BP: (!) 142/83 (01/11/22 0741)  SpO2: 98 % (01/11/22 0741) Vital Signs (24h Range):  Temp:  [97.8 °F (36.6 °C)-98.9 °F (37.2 °C)] 98.3 °F (36.8 °C)  Pulse:  [78-90] 78  Resp:  [13-29] 16  SpO2:  [93 %-100 %] 98 %  BP: (111-165)/(58-88) 142/83     Weight: 86 kg (189 lb 9.5 oz)  Height: 5' 5" (165.1 cm)  Body mass index is 31.55 kg/m².      Intake/Output Summary (Last 24 hours) at 1/11/2022 0744  Last data filed at 1/10/2022 2300  Gross per 24 hour   Intake 1250 ml   Output 670 ml   Net 580 ml       Ortho/SPM Exam   LUE:  Dressing in place c/d/i  Bulky dressing removed  SILT ax, med, rad, uln nerve  Demonstrates thumb IP flexion and extension, IF DIP flexion, finger ABduction and ADduction  Hand is warm and well perfused    Significant Labs: All pertinent labs within the past 24 hours have been reviewed.    Significant Imaging: I have reviewed and interpreted all pertinent imaging results/findings. XR shows appropriate position of rTSA implants without complication    Assessment/Plan:     S/p Left reverse total shoulder arthroplasty POD#1  Prophylactic antibiotics completed  Begin chemical DVT ppx this morning. Will start with 2.5mg Eliquis BID x 3 days then restart home dose of 5mg BID  NWB LUE  Shoulder pendulums encouraged  Elbow/wrist/hand ROM encouraged  Plan for d/c home today  F/u in clinic in 10-14 days        Anthony Alvarado MD  Orthopedics  [facility]  "

## 2022-01-11 NOTE — PLAN OF CARE
[facility]  Discharge Assessment    Primary Care Provider: Mateo Lambert DO     Discharge Assessment (most recent)     BRIEF DISCHARGE ASSESSMENT - 01/11/22 8788        Discharge Planning    Assessment Type Discharge Planning Assessment     Resource/Environmental Concerns none     Support Systems Parent     Assistance Needed none     Equipment Currently Used at Home rollator;shower chair     Current Living Arrangements home/apartment/condo     Patient/Family Anticipates Transition to home     Patient/Family Anticipated Services at Transition none     DME Needed Upon Discharge  none     Discharge Plan A Home with family     Discharge Plan B Home with family

## 2022-01-11 NOTE — PLAN OF CARE
Pt ready for discharge. No s/s of distress noted. Pt free from injury. IV discontinued.  Belongings with pt. Rx delivered to pt's bedside. Discharge instructions reviewed with pt. Pt verbalized understanding. Transportation arrangements made for pt.

## 2022-01-11 NOTE — PT/OT/SLP EVAL
Occupational Therapy   Evaluation    Name: Crow Green  MRN: [id number]  Admitting Diagnosis:  <principal problem not specified>  Recent Surgery: Procedure(s) (LRB):  ARTHROPLASTY, SHOULDER, TOTAL, REVERSE (Left) 1 Day Post-Op    Recommendations:     Discharge Recommendations: home health OT  Discharge Equipment Recommendations:  none  Barriers to discharge:       Assessment:     Crow Green is a 66 y.o. male with a medical diagnosis of <principal problem not specified>.  He presents with DEBILITY AND GENERALIZED WEAKNESS. Performance deficits affecting function: weakness,impaired balance,decreased safety awareness,impaired endurance,impaired self care skills,impaired functional mobilty,decreased upper extremity function,gait instability,pain.      Rehab Prognosis: Fair; patient would benefit from acute skilled OT services to address these deficits and reach maximum level of function.       Plan:     Patient to be seen   to address the above listed problems via self-care/home management,therapeutic activities,therapeutic groups  · Plan of Care Expires: 01/25/22  · Plan of Care Reviewed with: patient    Subjective     Chief Complaint: DEBILITY AND GENERALIZED WEAKNESS  Patient/Family Comments/goals:   Occupational Profile:  Living Environment: LIVES WITH FATHER IN 1 STORY HOUSE WITH NO STEPS TO ENTER  Previous level of function: (I) WITH ADL'S AND CAREGIVER OF FATHER  Roles and Routines: OCCUPATIONAL THERAPYEquipment Used at Home:     Assistance upon Discharge:     Pain/Comfort:  · Pain Rating 1: 10/10  · Location - Side 1: Left  · Location - Orientation 1: upper  · Location 1: arm    Patients cultural, spiritual, Voodoo conflicts given the current situation:      Objective:     Communicated with: NURSE AND Epic CHART REVIEW prior to session.  Patient found left sidelying with   upon OT entry to room.    General Precautions: Standard,     Orthopedic Precautions:LUE non weight bearing   Braces:  Shoulder abduction brace  Respiratory Status: Room air    Occupational Performance:    Bed Mobility:    · Patient completed Rolling/Turning to Left with  stand by assistance  · Patient completed Rolling/Turning to Right with stand by assistance  · Patient completed Scooting/Bridging with stand by assistance  · Patient completed Supine to Sit with stand by assistance    Functional Mobility/Transfers:  · Patient completed Sit <> Stand Transfer with contact guard assistance  with  hand-held assist   · Patient completed Bed <> Chair Transfer using Step Transfer technique with contact guard assistance with hand-held assist  Activities of Daily Living:  · Lower Body Dressing: total assistance PT WS UNABLE TO PERFORM/ ATTEMOPT TASK    Cognitive/Visual Perceptual:  Cognitive/Psychosocial Skills:     -       Oriented to: Person, Place, Time and Situation   -       Follows Commands/attention:Follows multistep  commands  -       Communication: clear/fluent  -       Memory: No Deficits noted  -       Safety awareness/insight to disability: intact     Physical Exam:  Upper Extremity Range of Motion:     -       Right Upper Extremity: WFL  -       Left Upper Extremity: NOT TESTED  Upper Extremity Strength:    -       Right Upper Extremity: MMT:4 TO  4+/5 GROSSLY  -       Left Upper Extremity: NOT TESTED   Strength:    -       Right Upper Extremity: WFL  -       Left Upper Extremity: NOT TESTED    AMPAC 6 Click ADL:  AMPAC Total Score: 15    Treatment & Education:  OT EVAL COMPLETED. PT EDUCATED ON O.T POC. PT WITH DEFICITS WITH FUNCTIONAL MOBILITY/ TRANSFER AS WELL AS ADL'S  Tx:  Education:   FUNCTIONAL MOBILITY: PT AMBULATED WITH MIN A/ CGA 50 FEET WITH HAND HELD ASSIST. PT REPORTED PLOF PT MOSTLY AMBULATED HOUSE HOLD DISTANCES. SEE EVAL FOR DETAILS. RECOMMEND : HOME HEALTH O.T.    Patient left up in chair with all lines intact, call button in reach and NURSE notified    GOALS:   Multidisciplinary Problems     Occupational  Therapy Goals        Problem: Occupational Therapy Goal    Goal Priority Disciplines Outcome Interventions   Occupational Therapy Goal     OT, PT/OT     Description: OT GOALS TO BE MET BY 1-24-22  PT REQ S WITH BSC TRANSFERS  PT REQ MIN A WITH LE DRESSING  PT WILL REQ S WITH G/H TASK WITH FAIR+ STANDING BALANCE AT SINK SIDE                       History:     Past Medical History:   Diagnosis Date    Arthritis     Asthma     Atrial fibrillation     Atrial fibrillation with RVR 8/7/2019    CHF (congestive heart failure)     Chronic obstructive pulmonary disease 8/5/2020    Depression     Dermatomyositis     Diabetes mellitus, type 2 Diagnosed in 2000    Elevated PSA     Follow up 7/30/2020    Hypertension     Myocardial infarction     2017    Sleep apnea        Past Surgical History:   Procedure Laterality Date    ABLATION OF ARRHYTHMOGENIC FOCUS FOR ATRIAL FIBRILLATION N/A 2/27/2020    Procedure: Ablation atrial fibrillation;  Surgeon: Gio Brown MD;  Location: [facility] EP LAB;  Service: Cardiology;  Laterality: N/A;  afib, DAMIEN (cx if SR), PVI, PITO, anes, MB, 3 Prep    CHOLECYSTECTOMY      CLOSURE OF WOUND Left 7/1/2020    Procedure: CLOSURE, WOUND;  Surgeon: Barb Veras DPM;  Location[facility] OR;  Service: Podiatry;  Laterality: Left;    INJECTION OF ANESTHETIC AGENT INTO SACROILIAC JOINT Left 3/24/2021    Procedure: Left BLOCK, SACROILIAC JOINT and bilateral rhomboid TPI;  Surgeon: Dillan Tsai MD;  Location: [facility];  Service: Pain Management;  Laterality: Left;    REVERSE TOTAL SHOULDER ARTHROPLASTY Left 1/10/2022    Procedure: ARTHROPLASTY, SHOULDER, TOTAL, REVERSE;  Surgeon: Anthony Alvarado MD;  Location: [facility];  Service: Orthopedics;  Laterality: Left;  left reverse total shoulder arthroplasty     SELECTIVE INJECTION OF ANESTHETIC AGENT AROUND LUMBAR SPINAL NERVE ROOT BY TRANSFORAMINAL APPROACH Left 6/11/2020    Procedure: BLOCK, SPINAL NERVE ROOT, LUMBAR,  SELECTIVE, TRANSFORAMINAL APPROACH Left L4-5, L5-S1 TFESI with RN IV sedation;  Surgeon: Dillan Tsai MD;  [facility];  Service: Pain Management;  Laterality: Left;    TOE AMPUTATION Left 6/29/2020    Procedure: AMPUTATION, TOE;  Surgeon: Barb Veras DPM;  Location: [facility];  Service: Podiatry;  Laterality: Left;  great toe       Time Tracking:     OT Date of Treatment: 01/11/22  OT Start Time: 1020  OT Stop Time: 1045  OT Total Time (min): 25 min    Billable Minutes:Evaluation 15 MINUTES  Therapeutic Activity 10 MINUTES    1/11/2022

## 2022-01-11 NOTE — PLAN OF CARE
SEE EVAL FOR DETAILS .  PT DISPLAYED DEFICITS WITH ADL'S  AND FUNCTIONAL MOBILITY/ TRANSFERS. RECOMMEND HOME HEALTH O.T.

## 2022-01-11 NOTE — PT/OT/SLP EVAL
Physical Therapy Evaluation    Patient Name:  Crow Green   MRN:  [id number]    Recommendations:     Discharge Recommendations:  home health PT (WITH S AT HOME)   Discharge Equipment Recommendations:  (TBD)   Barriers to discharge: None    Assessment:     Crow Green is a 66 y.o. male admitted with a medical diagnosis of <principal problem not specified>.  He presents with the following impairments/functional limitations:  weakness,impaired endurance,gait instability,impaired self care skills,decreased lower extremity function,decreased ROM,orthopedic precautions,decreased upper extremity function,impaired balance,pain,impaired functional mobilty,decreased safety awareness .    Rehab Prognosis: Good; patient would benefit from acute skilled PT services to address these deficits and reach maximum level of function.    Recent Surgery: Procedure(s) (LRB):  ARTHROPLASTY, SHOULDER, TOTAL, REVERSE (Left) 1 Day Post-Op    Plan:     During this hospitalization, patient to be seen 3 x/week to address the identified rehab impairments via gait training,therapeutic activities,therapeutic exercises and progress toward the following goals:    · Plan of Care Expires:  01/25/22    Subjective     Chief Complaint: PAIN L SHOULDER  Patient/Family Comments/goals: INC MOBILITY AND DEC PAIN   Pain/Comfort:  · Pain Rating 1: 10/10  · Location - Side 1: Left  · Location 1: arm  · Pain Rating Post-Intervention 1: 10/10    Patients cultural, spiritual, Cheondoism conflicts given the current situation:      Living Environment:   PT LIVES AT HOME WITH FATHER IN A ONE STORY HOME WITH RAMP TO ENTER HOME   Prior to admission, patients level of function was MOD I WITH ROLLATOR AND USED POWER WC WHICH IS BROKEN.  Equipment used at home: rollator,shower chair.  DME owned (not currently used): single point cane.  Upon discharge, patient will have assistance from UNKNOWN .    Objective:     Communicated with NURSE AND Epic CHART REVIEW   prior to session.  Patient found supine with peripheral IV  upon PT entry to room.    General Precautions: Standard, fall   Orthopedic Precautions:LUE non weight bearing   Braces: Shoulder abduction brace  Respiratory Status: Room air    Exams:  · RLE ROM: WFL  · RLE Strength: WFL  · LLE ROM: WFL  · LLE Strength: WFL    Functional Mobility:  · Bed Mobility:     · Supine to Sit: supervision  · Transfers:     · Sit to Stand:  contact guard assistance with no AD  · Bed to Chair: contact guard assistance with  hand-held assist  using  Stand Pivot  · Gait: 20' WITH NO AD AND CGA       AM-PAC 6 CLICK MOBILITY  Total Score:19     Patient left up in chair with call button in reach.    GOALS:   Multidisciplinary Problems     Physical Therapy Goals        Problem: Physical Therapy Goal    Goal Priority Disciplines Outcome Goal Variances Interventions   Physical Therapy Goal     PT, PT/OT      Description: LT22  1. PT WILL COMPLETE BED MOBILITY IND  2. PT WILL T/F TO CHAIR WITH SPC AND S  3. PT WILL GT TRAIN X 50' WITH SPC AND S                   History:     Past Medical History:   Diagnosis Date    Arthritis     Asthma     Atrial fibrillation     Atrial fibrillation with RVR 2019    CHF (congestive heart failure)     Chronic obstructive pulmonary disease 2020    Depression     Dermatomyositis     Diabetes mellitus, type 2 Diagnosed in     Elevated PSA     Follow up 2020    Hypertension     Myocardial infarction     2017    Sleep apnea        Past Surgical History:   Procedure Laterality Date    ABLATION OF ARRHYTHMOGENIC FOCUS FOR ATRIAL FIBRILLATION N/A 2020    Procedure: Ablation atrial fibrillation;  Surgeon: Gio Brown MD;  Location: [facility] EP LAB;  Service: Cardiology;  Laterality: N/A;  afib, DAMIEN (cx if SR), PVI, PITO, anes, MB, 3 Prep    CHOLECYSTECTOMY      CLOSURE OF WOUND Left 2020    Procedure: CLOSURE, WOUND;  Surgeon: Barb Veras DPM;  Location: [facility]  OR;  Service: Podiatry;  Laterality: Left;    INJECTION OF ANESTHETIC AGENT INTO SACROILIAC JOINT Left 3/24/2021    Procedure: Left BLOCK, SACROILIAC JOINT and bilateral rhomboid TPI;  Surgeon: Dillan Tsai MD;  Location: [facility] PAIN T;  Service: Pain Management;  Laterality: Left;    SELECTIVE INJECTION OF ANESTHETIC AGENT AROUND LUMBAR SPINAL NERVE ROOT BY TRANSFORAMINAL APPROACH Left 6/11/2020    Procedure: BLOCK, SPINAL NERVE ROOT, LUMBAR, SELECTIVE, TRANSFORAMINAL APPROACH Left L4-5, L5-S1 TFESI with RN IV sedation;  Surgeon: Dillan Tsai MD;  Location: [facility] PAIN MGT;  Service: Pain Management;  Laterality: Left;    TOE AMPUTATION Left 6/29/2020    Procedure: AMPUTATION, TOE;  Surgeon: Barb Veras DPM;  Location: [facility];  Service: Podiatry;  Laterality: Left;  great toe       Time Tracking:     PT Received On: 01/11/22  PT Start Time: 1040     PT Stop Time: 1100  PT Total Time (min): 20 min     Billable Minutes: Evaluation 20 01/11/2022

## 2022-01-11 NOTE — NURSING
Chart reviewed for diabetes  Patient has been seen by this nurse on previous admit  No further action at this time

## 2022-01-12 ENCOUNTER — OUTPATIENT CASE MANAGEMENT (OUTPATIENT)
Dept: ADMINISTRATIVE | Facility: OTHER | Age: 67
End: 2022-01-12
Payer: MEDICARE

## 2022-01-12 NOTE — PLAN OF CARE
[facility] - Med Surg  Discharge Final Note    Primary Care Provider: Mateo Lambert DO    Expected Discharge Date: 1/11/2022    Final Discharge Note (most recent)     Final Note - 01/12/22 1023        Final Note    Assessment Type Final Discharge Note     Anticipated Discharge Disposition Home or Self Care     Hospital Resources/Appts/Education Provided Provided patient/caregiver with written discharge plan information;Appointments scheduled and added to AVS        Post-Acute Status    Discharge Delays None known at this time                 Important Message from Medicare

## 2022-01-12 NOTE — LETTER
January 19, 2022    Crow FISCHER Norma  [address]  [facility] [address]              Dear Mr. Green,    I am writing from the Outpatient Care Management Department at [facility].I have been unable to reach you by phone since we last spoke on 1/6/22. Please contact me if you would like to discuss any needs.     I can be reached at [phone] Monday thru Friday from 8:00am to 4:30pm.  [facility] also has a program with a nurse available 24/7 to answer questions or provide medical advice.  [facility] can be reached at [phone].     Sincerely,   Avni RYDER RN

## 2022-01-13 ENCOUNTER — SPECIALTY PHARMACY (OUTPATIENT)
Dept: PHARMACY | Facility: CLINIC | Age: 67
End: 2022-01-13
Payer: MEDICARE

## 2022-01-13 DIAGNOSIS — M33.13 DERMATOMYOSITIS: ICD-10-CM

## 2022-01-13 RX ORDER — AZATHIOPRINE 50 MG/1
50 TABLET ORAL DAILY
Qty: 90 TABLET | Refills: 0 | Status: SHIPPED | OUTPATIENT
Start: 2022-01-13 | End: 2023-10-30

## 2022-01-14 ENCOUNTER — TELEPHONE (OUTPATIENT)
Dept: INTERNAL MEDICINE | Facility: CLINIC | Age: 67
End: 2022-01-14
Payer: MEDICARE

## 2022-01-14 ENCOUNTER — PATIENT OUTREACH (OUTPATIENT)
Dept: ADMINISTRATIVE | Facility: OTHER | Age: 67
End: 2022-01-14
Payer: MEDICARE

## 2022-01-14 NOTE — PROGRESS NOTES
Health Maintenance Due   Topic Date Due    Colorectal Cancer Screening  Never done     Updates were requested from care everywhere.  Chart was reviewed for overdue Proactive [facility] Encounters (REJI) topics (CRS, Breast Cancer Screening, Eye exam)  Health Maintenance has been updated.  LINKS immunization registry triggered.  Immunizations were reconciled.

## 2022-01-14 NOTE — TELEPHONE ENCOUNTER
----- Message from Sangeetha Xie sent at 1/14/2022  1:37 PM CST -----  Contact: Crow Breen is needing a call back regarding him not having his medication. Please call him back at [phone]

## 2022-01-18 ENCOUNTER — OFFICE VISIT (OUTPATIENT)
Dept: OPHTHALMOLOGY | Facility: CLINIC | Age: 67
End: 2022-01-18
Payer: MEDICARE

## 2022-01-18 DIAGNOSIS — E11.3293 TYPE 2 DIABETES MELLITUS WITH BOTH EYES AFFECTED BY MILD NONPROLIFERATIVE RETINOPATHY WITHOUT MACULAR EDEMA, WITH LONG-TERM CURRENT USE OF INSULIN: Primary | ICD-10-CM

## 2022-01-18 DIAGNOSIS — M35.01 KERATOCONJUNCTIVITIS SICCA: ICD-10-CM

## 2022-01-18 DIAGNOSIS — E11.36 DIABETIC CATARACT: ICD-10-CM

## 2022-01-18 DIAGNOSIS — Z79.4 TYPE 2 DIABETES MELLITUS WITH BOTH EYES AFFECTED BY MILD NONPROLIFERATIVE RETINOPATHY WITHOUT MACULAR EDEMA, WITH LONG-TERM CURRENT USE OF INSULIN: Primary | ICD-10-CM

## 2022-01-18 DIAGNOSIS — H02.59 FLOPPY EYELID SYNDROME OF BOTH EYES: ICD-10-CM

## 2022-01-18 DIAGNOSIS — H40.1132 PRIMARY OPEN ANGLE GLAUCOMA (POAG) OF BOTH EYES, MODERATE STAGE: ICD-10-CM

## 2022-01-18 PROCEDURE — 99213 OFFICE O/P EST LOW 20 MIN: CPT | Mod: HCNC,S$GLB,, | Performed by: OPHTHALMOLOGY

## 2022-01-18 PROCEDURE — 92134 POSTERIOR SEGMENT OCT RETINA (OCULAR COHERENCE TOMOGRAPHY)-BOTH EYES: ICD-10-PCS | Mod: HCNC,S$GLB,, | Performed by: OPHTHALMOLOGY

## 2022-01-18 PROCEDURE — 1159F MED LIST DOCD IN RCRD: CPT | Mod: HCNC,CPTII,S$GLB, | Performed by: OPHTHALMOLOGY

## 2022-01-18 PROCEDURE — 1160F PR REVIEW ALL MEDS BY PRESCRIBER/CLIN PHARMACIST DOCUMENTED: ICD-10-PCS | Mod: HCNC,CPTII,S$GLB, | Performed by: OPHTHALMOLOGY

## 2022-01-18 PROCEDURE — 92134 CPTRZ OPH DX IMG PST SGM RTA: CPT | Mod: HCNC,S$GLB,, | Performed by: OPHTHALMOLOGY

## 2022-01-18 PROCEDURE — 99213 PR OFFICE/OUTPT VISIT, EST, LEVL III, 20-29 MIN: ICD-10-PCS | Mod: HCNC,S$GLB,, | Performed by: OPHTHALMOLOGY

## 2022-01-18 PROCEDURE — 99499 UNLISTED E&M SERVICE: CPT | Mod: S$GLB,,, | Performed by: OPHTHALMOLOGY

## 2022-01-18 PROCEDURE — 99999 PR PBB SHADOW E&M-EST. PATIENT-LVL III: CPT | Mod: PBBFAC,HCNC,, | Performed by: OPHTHALMOLOGY

## 2022-01-18 PROCEDURE — 99999 PR PBB SHADOW E&M-EST. PATIENT-LVL III: ICD-10-PCS | Mod: PBBFAC,HCNC,, | Performed by: OPHTHALMOLOGY

## 2022-01-18 PROCEDURE — 1160F RVW MEDS BY RX/DR IN RCRD: CPT | Mod: HCNC,CPTII,S$GLB, | Performed by: OPHTHALMOLOGY

## 2022-01-18 PROCEDURE — 99499 RISK ADDL DX/OHS AUDIT: ICD-10-PCS | Mod: S$GLB,,, | Performed by: OPHTHALMOLOGY

## 2022-01-18 PROCEDURE — 1159F PR MEDICATION LIST DOCUMENTED IN MEDICAL RECORD: ICD-10-PCS | Mod: HCNC,CPTII,S$GLB, | Performed by: OPHTHALMOLOGY

## 2022-01-18 RX ORDER — NEOMYCIN SULFATE, POLYMYXIN B SULFATE AND DEXAMETHASONE 3.5; 10000; 1 MG/ML; [USP'U]/ML; MG/ML
1 SUSPENSION/ DROPS OPHTHALMIC 3 TIMES DAILY
Qty: 5 ML | Refills: 0 | Status: SHIPPED | OUTPATIENT
Start: 2022-01-18 | End: 2022-01-25

## 2022-01-18 NOTE — PROGRESS NOTES
"===============================  Date today is 1/18/2022  Crow Green is a 66 y.o. male  Last visit [facility]: :9/13/2021   Last visit eye dept. 9/13/2021    Uncorrected distance visual acuity was 20/40 in the right eye and 20/40 in the left eye.  Tonometry     Tonometry (Applanation, 8:24 AM)       Right Left    Pressure 16 16          Max Pressure       Right Left    Max 33 (10/22/15) 26 (10/22/15)              Wearing Rx     Wearing Rx       Sphere Cylinder Axis Add    Right +0.75 +0.25 180 +2.50    Left +0.75 +0.25 090 +2.50    Type: Bifocal              Manifest Refraction     Manifest Refraction       Sphere Cylinder [place] [facility]    Right +1.00 +0.25 180 20/20    Left +0.50 +0.25 090 20/20              Not recorded       Chief Complaint   Patient presents with    BDR     4 MONTH BDR CHECK UP       Problem List Items Addressed This Visit    None     Visit Diagnoses     Type 2 diabetes mellitus with both eyes affected by mild nonproliferative retinopathy without macular edema, with long-term current use of insulin    -  Primary    Relevant Orders    Posterior Segment OCT Retina-Both eyes (Completed)    Diabetic cataract        Primary open angle glaucoma (POAG) of both eyes, moderate stage        Floppy eyelid syndrome of both eyes        Keratoconjunctivitis sicca              ________________  1/18/2022 today    " not good ou burning - see spots"      .  Oct good-stable   iop ok   Cornea ok   lens ok    c/d .6 -.7   bdr  "almost snpdr"   no vh no prh  COAG  Continue Latanoprost drops, add Maxitrol TID OU for a week for burning, then stop    RTC 3-4 months  Instructed to call 24/7 for any worsening of vision or symptoms. Check OU daily.   Gave my office and cell phone number.      ===============================      "

## 2022-01-19 ENCOUNTER — TELEPHONE (OUTPATIENT)
Dept: INTERNAL MEDICINE | Facility: CLINIC | Age: 67
End: 2022-01-19
Payer: MEDICARE

## 2022-01-19 NOTE — TELEPHONE ENCOUNTER
Spoke with patient about refill of pain meds from surgery. Advised patient he will have to consult with Dr. Alvarado office for refill on pain meds from surgery. patient is waiting on a call back from his office

## 2022-01-19 NOTE — TELEPHONE ENCOUNTER
----- Message from Rosa Baum sent at 1/19/2022 10:45 AM CST -----  Pt is calling in regards to having pain. Please call [phone] (home)

## 2022-01-20 ENCOUNTER — SPECIALTY PHARMACY (OUTPATIENT)
Dept: PHARMACY | Facility: CLINIC | Age: 67
End: 2022-01-20
Payer: MEDICARE

## 2022-01-20 ENCOUNTER — TELEPHONE (OUTPATIENT)
Dept: SPORTS MEDICINE | Facility: CLINIC | Age: 67
End: 2022-01-20
Payer: MEDICARE

## 2022-01-20 NOTE — TELEPHONE ENCOUNTER
BI: Complete     RX [employer]     Estimated Copay: $1.35 (90 days supply), pt is in the deductible phase, LIS 02.   [facility] is in network  PA not required     FA not required

## 2022-01-20 NOTE — TELEPHONE ENCOUNTER
LVM for patient to return call to clinic to discuss pain medication. Patient did not answer. -DD    ----- Message from Jeanie Mayen sent at 1/20/2022  8:50 AM CST -----  Contact: Crow Wright is requesting a call in regards to medication. He stated that medicine he was placed on does not help pain. Please call him back at [phone].            Thanks  DD      
[phone]

## 2022-01-20 NOTE — TELEPHONE ENCOUNTER
Specialty Pharmacy - Refill Coordination    Specialty Medication Orders Linked to Encounter    Flowsheet Row Most Recent Value   Medication #1 azaTHIOprine (IMURAN) 50 mg Tab (Order#[id number], Rx#[id number])          Refill Questions - Documented Responses    Flowsheet Row Most Recent Value   Patient Availability and HIPAA Verification    Does patient want to proceed with activity? Yes   HIPAA/medical authority confirmed? Yes   Relationship to patient of person spoken to? Self   Refill Screening Questions    Changes to allergies? No   Changes to medications? No   New conditions since last clinic visit? No   Unplanned office visit, urgent care, ED, or hospital admission in the last 4 weeks? No   How does patient/caregiver feel medication is working? Good   Financial problems or insurance changes? No   How many doses of your specialty medications were missed in the last 4 weeks? --  [declined answering]   Would patient like to speak to a pharmacist? No   When does the patient need to receive the medication? 01/21/22   Refill Delivery Questions    How will the patient receive the medication? Delivery Tiny   When does the patient need to receive the medication? 01/21/22   Shipping Address Home   Address in [facility] confirmed and updated if neccessary? Yes   Expected Copay ($) 1.35   Is the patient able to afford the medication copay? Yes   Payment Method invoice (approval required)   Days supply of Refill 30   Supplies needed? No supplies needed   Refill activity completed? Yes   Refill activity plan Refill scheduled   Shipment/Pickup Date: 01/21/22          Current Outpatient Medications   Medication Sig    acetaminophen (TYLENOL) 500 MG tablet Take 2 tablets (1,000 mg total) by mouth every 8 (eight) hours as needed for Pain.    albuterol (PROVENTIL) 2.5 mg /3 mL (0.083 %) nebulizer solution Take 3 mLs (2.5 mg total) by nebulization every 4 to 6 hours as needed for Wheezing or Shortness of Breath.     allopurinoL (ZYLOPRIM) 100 MG tablet Take 1 tablet (100 mg total) by mouth once daily.    amLODIPine (NORVASC) 2.5 MG tablet Take 1 tablet (2.5 mg total) by mouth once daily. (Patient taking differently: Take 2.5 mg by mouth every evening.)    aspirin (ECOTRIN) 81 MG EC tablet Take 1 tablet (81 mg total) by mouth once daily.    atorvastatin (LIPITOR) 40 MG tablet Take 1 tablet (40 mg total) by mouth every evening.    azaTHIOprine (IMURAN) 50 mg Tab Take 1 tablet (50 mg total) by mouth once daily.    azaTHIOprine (IMURAN) 50 mg Tab Take 1 tablet (50 mg total) by mouth once daily.    baclofen (LIORESAL) 10 MG tablet TAKE 1 TABLET EVERY DAY    blood sugar diagnostic Strp 1 each by Misc.(Non-Drug; Combo Route) route 4 (four) times daily.    blood-glucose meter (TRUE METRIX GLUCOSE METER) kit Use as instructed to test blood glucose meter daily.    colchicine (COLCRYS) 0.6 mg tablet TAKE 1 TABLET (0.6 MG TOTAL) BY MOUTH ONCE DAILY.    DEPO-MEDROL 40 mg/mL injection     diclofenac sodium (VOLTAREN) 1 % Gel APPLY 2 GRAMS TOPICALLY FOUR TIMES DAILY    ELIQUIS 5 mg Tab TAKE 1 TABLET TWICE DAILY    empagliflozin (JARDIANCE) 25 mg tablet Take 1 tablet (25 mg total) by mouth once daily.    famotidine (PEPCID) 20 MG tablet Take 1 tablet (20 mg total) by mouth 2 (two) times daily.    fentaNYL (SUBLIMAZE) 50 mcg/mL injection     fluticasone-salmeterol diskus inhaler 250-50 mcg Inhale 1 puff into the lungs 2 (two) times daily. Controller    food supplemt, lactose-reduced (ENSURE) Liqd Take 118 mLs by mouth 3 (three) times daily with meals.    furosemide (LASIX) 40 MG tablet Take 1 tablet (40 mg total) by mouth once daily. TAKE 2 TABLETS EVERY MORNING  AND TAKE 1 TABLET EVERY EVENING    gabapentin (NEURONTIN) 800 MG tablet Take 1 tablet (800 mg total) by mouth 3 (three) times daily.    heparin sodium,porcine/D5W (HEPARIN, PORCINE, IN 5 % DEX) 25,000 unit/250 mL(100 unit/mL) infusion     insulin aspart U-100  (NOVOLOG FLEXPEN U-100 INSULIN) 100 unit/mL (3 mL) InPn pen INJECT 10 UNITS SUBCUTANEOUSLY THREE TIMES DAILY WITH MEALS    insulin degludec (TRESIBA FLEXTOUCH U-200) 200 unit/mL (3 mL) insulin pen Inject 40 Units into the skin once daily.    ipratropium (ATROVENT) 0.02 % nebulizer solution INHALE THE CONTENTS OF 1 VIAL VIA NEBULIZER FOUR TIMES DAILY    isosorbide mononitrate (IMDUR) 60 MG 24 hr tablet TAKE 1 TABLET EVERY DAY    lancets Misc 1 each by Misc.(Non-Drug; Combo Route) route 4 (four) times daily.    latanoprost 0.005 % ophthalmic solution PLACE 1 DROP INTO BOTH EYES ONCE DAILY.    lisinopriL 10 MG tablet Take 10 mg by mouth once daily.    magnesium oxide (MAG-OX) 400 mg (241.3 mg magnesium) tablet Take 1 tablet (400 mg total) by mouth once daily.    metFORMIN (GLUCOPHAGE-XR) 500 MG ER 24hr tablet TAKE 2 TABLETS TWICE DAILY WITH MEALS    metoprolol succinate (TOPROL-XL) 50 MG 24 hr tablet Take 50 mg by mouth every evening.    midazolam (VERSED) 1 mg/mL injection     naproxen (EC NAPROSYN) 500 MG EC tablet TAKE 1 TABLET(500 MG) BY MOUTH TWICE DAILY WITH MEALS FOR 14 DAYS FOR SHOULDER OR BACK PAIN    neomycin-polymyxin-dexamethasone (MAXITROL) 3.5mg/mL-10,000 unit/mL-0.1 % DrpS Place 1 drop into both eyes 3 (three) times daily. for 7 days    nitroGLYCERIN (NITROSTAT) 0.4 MG SL tablet Place 1 tablet (0.4 mg total) under the tongue every 5 (five) minutes as needed for Chest pain.    OMNIPAQUE 240 240 mg iodine/mL injection     oxyCODONE (ROXICODONE) 5 MG immediate release tablet Take 1 tablet (5 mg total) by mouth every 4 (four) hours as needed for Pain (post-op pain).    OZEMPIC 1 mg/dose (2 mg/1.5 mL) PnIj Inject 0.75 mLs (1 mg total) into the skin once a week.    OZEMPIC 1 mg/dose (4 mg/3 mL) INJECT 1MG SUBCUTANEOUSLY ONE TIME WEEKLY    pantoprazole (PROTONIX) 40 MG tablet Take 1 tablet (40 mg total) by mouth once daily.    ranolazine (RANEXA) 500 MG Tb12 Take 1 tablet (500 mg total) by  mouth 2 (two) times daily.    sertraline (ZOLOFT) 100 MG tablet Take 1 tablet (100 mg total) by mouth every evening.    tacrolimus (PROTOPIC) 0.1 % ointment Apply topically 2 (two) times daily.    tamsulosin (FLOMAX) 0.4 mg Cap Take 1 capsule (0.4 mg total) by mouth once daily.    tiotropium (SPIRIVA WITH HANDIHALER) 18 mcg inhalation capsule INHALE THE CONTENTS OF 1 CAPSULE ONE TIME DAILY (CONTROLLER)    traMADoL (ULTRAM) 50 mg tablet Take 1 tablet (50 mg total) by mouth every 4 to 6 hours as needed for Pain.    traZODone (DESYREL) 100 MG tablet TAKE 2 TABLETS EVERY EVENING    triamcinolone acetonide 0.1% (KENALOG) 0.1 % ointment Apply topically 2 (two) times daily. Use on legs and back    VIOS AEROSOL DELIVERY SYSTEM Shefali USE AS DIRESTED   Last reviewed on 1/18/2022  8:47 AM by CHRIS Capps MD    Review of patient's allergies indicates:   Allergen Reactions    Protamine Hives     Urticaria, possible upper airway swelling    Last reviewed on  1/18/2022 8:47 AM by AALIYAH Capps      Tasks added this encounter   2/13/2022 - Refill Call (Auto Added)   Tasks due within next 3 months   No tasks due.     Carly Bolanos, PharmD  [facility]  [address] [place] [id number]  Phone: [phone]  Fax: [phone]

## 2022-01-20 NOTE — TELEPHONE ENCOUNTER
Spoke w/ patient in regards to his pain medication expressed to patient that I will send the provider a message and expressed to patient that the surgeon is currently in surgery today but once he responds I will give him a call. Patient verbalized understanding and was grateful for the call-DD    ----- Message from Lucille Campbell sent at 1/20/2022  9:48 AM CST -----  Regarding: pain  Contact: patient  Patient states that he needs a refill on pain med, patient states that he does not want the 5mg, it does not work on him, states if he knew he was going to be prescribed dosage that he would not have had the surgery, please call him back at [phone]

## 2022-01-21 ENCOUNTER — TELEPHONE (OUTPATIENT)
Dept: INTERNAL MEDICINE | Facility: CLINIC | Age: 67
End: 2022-01-21
Payer: MEDICARE

## 2022-01-21 RX ORDER — HYDROCODONE BITARTRATE AND ACETAMINOPHEN 10; 325 MG/1; MG/1
1 TABLET ORAL EVERY 6 HOURS PRN
Qty: 30 TABLET | Refills: 0 | Status: SHIPPED | OUTPATIENT
Start: 2022-01-21 | End: 2022-02-15 | Stop reason: SDUPTHER

## 2022-01-21 NOTE — TELEPHONE ENCOUNTER
Pt called requesting a call back in regards to his pain rated on a 10/10 pain scale. Pt requesting refills on pain meds. Would like to discuss with surgeon.

## 2022-01-21 NOTE — TELEPHONE ENCOUNTER
----- Message from Chris COOK LPN sent at 1/21/2022  3:10 PM CST -----  Contact: self    ----- Message -----  From: Irma Reynolds  Sent: 1/21/2022   2:35 PM CST  To: Meera Clarke Staff    Crow Green would like a call back at [phone], in regards to in regards to his shoulder, he states that he will would  to discuss going to a different doctor for his shoulder.

## 2022-01-21 NOTE — PROGRESS NOTES
Outpatient Care Management   - Care Plan Follow Up    Patient: Crow Green  MRN:  [id number]  Date of Service:  1/21/2022  Completed by:  Amanda Whipple LCSW  Referral Date: 09/16/2021  Program: Case Management (High Risk)    Reason for Visit   Patient presents with    OPCM Chart Review     01/18/22    Update Plan Of Care     01/21/22       Brief Summary: Phoned patient. Patient focused on not receiving more pain medication and fluid pills. Noted that LCSW would address his concerns with OPCM RN.   He described issues in moving his shoulder and arm. Reinforced that he is unable to keep the arm immobile causing difficulty in sleeping. Discussed support system. He said that HH services would be beneficial.  Phoned [facility] , confirmed that they are active in his care at this time.   In basket message sent to OPCM RN to address medication needs.     Complex Care Plan     Care plan was discussed and completed today with input from patient and/or caregiver.    Patient Instructions     Instructions were provided via the [email or web address] patient resources and are available for the patient to view on the patient portal.    Follow up in about 7 days (around 1/28/2022) for Follow up on MH treatment.    Todays OPC Self-Management Care Plan was developed with the patients/caregivers input and was based on identified barriers from todays assessment.  Goals were written today with the patient/caregiver and the patient has agreed to work towards these goals to improve his/her overall well-being. Patient verbalized understanding of the care plan, goals, and all of today's instructions. Encouraged patient/caregiver to communicate with his/her physician and health care team about health conditions and the treatment plan.  Provided my contact information today and encouraged patient/caregiver to call me with any questions as needed.

## 2022-01-25 ENCOUNTER — HOSPITAL ENCOUNTER (OUTPATIENT)
Dept: RADIOLOGY | Facility: HOSPITAL | Age: 67
Discharge: HOME | End: 2022-01-25
Attending: STUDENT IN AN ORGANIZED HEALTH CARE EDUCATION/TRAINING PROGRAM
Payer: MEDICARE

## 2022-01-25 ENCOUNTER — DOCUMENT SCAN (OUTPATIENT)
Dept: HOME HEALTH SERVICES | Facility: HOSPITAL | Age: 67
End: 2022-01-25
Payer: MEDICARE

## 2022-01-25 ENCOUNTER — OFFICE VISIT (OUTPATIENT)
Dept: ORTHOPEDICS | Facility: CLINIC | Age: 67
End: 2022-01-25
Payer: MEDICARE

## 2022-01-25 VITALS — BODY MASS INDEX: 31.49 KG/M2 | WEIGHT: 189 LBS | HEIGHT: 65 IN

## 2022-01-25 DIAGNOSIS — M12.812 ROTATOR CUFF TEAR ARTHROPATHY OF LEFT SHOULDER: Primary | ICD-10-CM

## 2022-01-25 DIAGNOSIS — M75.102 ROTATOR CUFF TEAR ARTHROPATHY OF LEFT SHOULDER: Primary | ICD-10-CM

## 2022-01-25 DIAGNOSIS — Z96.612 S/P REVERSE TOTAL SHOULDER ARTHROPLASTY, LEFT: ICD-10-CM

## 2022-01-25 PROCEDURE — 99999 PR PBB SHADOW E&M-EST. PATIENT-LVL IV: ICD-10-PCS | Mod: PBBFAC,HCNC,, | Performed by: STUDENT IN AN ORGANIZED HEALTH CARE EDUCATION/TRAINING PROGRAM

## 2022-01-25 PROCEDURE — 1160F RVW MEDS BY RX/DR IN RCRD: CPT | Mod: HCNC,CPTII,S$GLB, | Performed by: STUDENT IN AN ORGANIZED HEALTH CARE EDUCATION/TRAINING PROGRAM

## 2022-01-25 PROCEDURE — 73030 X-RAY EXAM OF SHOULDER: CPT | Mod: 26,HCNC,LT, | Performed by: RADIOLOGY

## 2022-01-25 PROCEDURE — G0179 PR HOME HEALTH MD RECERTIFICATION: ICD-10-PCS | Mod: ,,, | Performed by: FAMILY MEDICINE

## 2022-01-25 PROCEDURE — 99999 PR PBB SHADOW E&M-EST. PATIENT-LVL IV: CPT | Mod: PBBFAC,HCNC,, | Performed by: STUDENT IN AN ORGANIZED HEALTH CARE EDUCATION/TRAINING PROGRAM

## 2022-01-25 PROCEDURE — G0179 MD RECERTIFICATION HHA PT: HCPCS | Mod: ,,, | Performed by: FAMILY MEDICINE

## 2022-01-25 PROCEDURE — 1159F PR MEDICATION LIST DOCUMENTED IN MEDICAL RECORD: ICD-10-PCS | Mod: HCNC,CPTII,S$GLB, | Performed by: STUDENT IN AN ORGANIZED HEALTH CARE EDUCATION/TRAINING PROGRAM

## 2022-01-25 PROCEDURE — 99024 POSTOP FOLLOW-UP VISIT: CPT | Mod: HCNC,S$GLB,, | Performed by: STUDENT IN AN ORGANIZED HEALTH CARE EDUCATION/TRAINING PROGRAM

## 2022-01-25 PROCEDURE — 73030 X-RAY EXAM OF SHOULDER: CPT | Mod: TC,HCNC,LT

## 2022-01-25 PROCEDURE — 3008F PR BODY MASS INDEX (BMI) DOCUMENTED: ICD-10-PCS | Mod: HCNC,CPTII,S$GLB, | Performed by: STUDENT IN AN ORGANIZED HEALTH CARE EDUCATION/TRAINING PROGRAM

## 2022-01-25 PROCEDURE — 3008F BODY MASS INDEX DOCD: CPT | Mod: HCNC,CPTII,S$GLB, | Performed by: STUDENT IN AN ORGANIZED HEALTH CARE EDUCATION/TRAINING PROGRAM

## 2022-01-25 PROCEDURE — 1160F PR REVIEW ALL MEDS BY PRESCRIBER/CLIN PHARMACIST DOCUMENTED: ICD-10-PCS | Mod: HCNC,CPTII,S$GLB, | Performed by: STUDENT IN AN ORGANIZED HEALTH CARE EDUCATION/TRAINING PROGRAM

## 2022-01-25 PROCEDURE — 1159F MED LIST DOCD IN RCRD: CPT | Mod: HCNC,CPTII,S$GLB, | Performed by: STUDENT IN AN ORGANIZED HEALTH CARE EDUCATION/TRAINING PROGRAM

## 2022-01-25 PROCEDURE — 73030 XR SHOULDER COMPLETE 2 OR MORE VIEWS LEFT: ICD-10-PCS | Mod: 26,HCNC,LT, | Performed by: RADIOLOGY

## 2022-01-25 PROCEDURE — 99024 PR POST-OP FOLLOW-UP VISIT: ICD-10-PCS | Mod: HCNC,S$GLB,, | Performed by: STUDENT IN AN ORGANIZED HEALTH CARE EDUCATION/TRAINING PROGRAM

## 2022-01-25 NOTE — PROGRESS NOTES
Orthopaedics  Post-operative follow-up    Procedure Performed:   Left Reverse Total Shoulder Arthroplasty    Date of Surgery: 01/10/2022    Subjective: Crow Green is now almost 2 weeks out from his shoulder surgery.  He is doing well with no specific complaints other than the expected post-operative pain and stiffness.  He has been compliant with post-operative restrictions.  He has started PT with home health.      Exam:  Sutures removed, incision sites benign with no drainage or redness  Mild bruising as anticipated  ROM fluid, will be formally assessed at next visit  Axillary nerve sensation and motor intact  Motor and sensory intact distally  Strong radial pulse, fingers warm and well perfused    Imaging:  X-Ray Shoulder 2 or More Views Left  Narrative: EXAMINATION:  XR SHOULDER COMPLETE 2 OR MORE VIEWS LEFT    CLINICAL HISTORY:  Presence of left artificial shoulder joint    TECHNIQUE:  Two or three views of the left shoulder were performed.    COMPARISON:  01/10/2022 and 02/21/2020    FINDINGS:  Total arthroplasty findings present with anatomic alignment.  No acute osseous abnormality.  Left lung unchanged.  Impression: As above    Electronically signed by: Shamar Kovacs MD  Date:    01/25/2022  Time:    08:37        Impression:  S/P left Reverse Total Shoulder Arthroplasty, initial post-operative visit - doing well    Plan:  Discussed surgical findings, operative procedure  Reviewed post-operative instructions, restrictions, and rehabilitation  Patient prefers to do PT here at [facility]  Provided PT script and protocol  Symptomatic treatment for pain / swelling  Instructed patient to call clinic if questions or concerns      Follow-up in 1 month at 6 weeks post-op          Anthony Alvarado MD

## 2022-01-27 ENCOUNTER — TELEPHONE (OUTPATIENT)
Dept: INTERNAL MEDICINE | Facility: CLINIC | Age: 67
End: 2022-01-27
Payer: MEDICARE

## 2022-01-27 ENCOUNTER — DOCUMENT SCAN (OUTPATIENT)
Dept: HOME HEALTH SERVICES | Facility: HOSPITAL | Age: 67
End: 2022-01-27
Payer: MEDICARE

## 2022-01-27 DIAGNOSIS — G89.29 OTHER CHRONIC PAIN: ICD-10-CM

## 2022-01-27 DIAGNOSIS — I50.42 CHRONIC COMBINED SYSTOLIC AND DIASTOLIC CONGESTIVE HEART FAILURE: Primary | ICD-10-CM

## 2022-01-27 NOTE — TELEPHONE ENCOUNTER
Care Due:                  Date            Visit Type   Department     Provider  --------------------------------------------------------------------------------                                             [facility] INTER[facility]  Last Visit: 11-      None         FRANKLYN Lambert                                           [facility] INTERNAL  Next Visit: 02-      None         FRANKLYN Lambert                                                            Last  Test          Frequency    Reason                     Performed    Due Date  --------------------------------------------------------------------------------    Uric Acid...  12 months..  allopurinoL..............  06- 06-    Powered by [employer] Reference number: [id number].   1/27/2022 3:33:23 PM CST

## 2022-01-27 NOTE — TELEPHONE ENCOUNTER
----- Message from Cheryl Hull sent at 1/27/2022 12:56 PM CST -----  Contact: pt  The pt request a return call, no additional info given and can be reached at [phone]///[id number]

## 2022-01-27 NOTE — TELEPHONE ENCOUNTER
Pt called requesting a rx sent to DME for a new hospital bed mattress, pt states bed mattress has flatten.  Pt has regular size hospital bed.

## 2022-01-28 ENCOUNTER — TELEPHONE (OUTPATIENT)
Dept: ORTHOPEDICS | Facility: CLINIC | Age: 67
End: 2022-01-28
Payer: MEDICARE

## 2022-01-28 ENCOUNTER — OUTPATIENT CASE MANAGEMENT (OUTPATIENT)
Dept: ADMINISTRATIVE | Facility: OTHER | Age: 67
End: 2022-01-28
Payer: MEDICARE

## 2022-01-28 DIAGNOSIS — M75.102 ROTATOR CUFF TEAR ARTHROPATHY OF LEFT SHOULDER: ICD-10-CM

## 2022-01-28 DIAGNOSIS — Z96.612 S/P REVERSE TOTAL SHOULDER ARTHROPLASTY, LEFT: Primary | ICD-10-CM

## 2022-01-28 DIAGNOSIS — M12.812 ROTATOR CUFF TEAR ARTHROPATHY OF LEFT SHOULDER: ICD-10-CM

## 2022-01-28 NOTE — PROGRESS NOTES
Outpatient Care Management   - Care Plan Follow Up    Patient: Crow Green  MRN:  [id number]  Date of Service:  1/28/2022  Completed by:  Amanda Whipple LCSW  Referral Date: 09/16/2021  Program: Case Management (High Risk)    Reason for Visit   Patient presents with    Update Plan Of Care       Brief Summary: Patient stated improvement in situation. Stated that HH PT had helped but service has ended.   Discussed going back to IOP. He stated that he is thinking about it and will call to re-admit to IOP.   Patient asked how to replace Medicare card. Agreed to receipt of information in the mail and follow up next week.   Patient to call IOP. Next step: mail information about how to replace Medicare card.     Complex Care Plan     Care plan was discussed and completed today with input from patient and/or caregiver.    Patient Instructions     Instructions were provided via the [employer] patient resources and are available for the patient to view on the patient portal.    Follow up in about 1 week (around 2/4/2022) for Verify process for getting a replacement Medicare card. Update on IOP..    Todays OPCM Self-Management Care Plan was developed with the patients/caregivers input and was based on identified barriers from todays assessment.  Goals were written today with the patient/caregiver and the patient has agreed to work towards these goals to improve his/her overall well-being. Patient verbalized understanding of the care plan, goals, and all of today's instructions. Encouraged patient/caregiver to communicate with his/her physician and health care team about health conditions and the treatment plan.  Provided my contact information today and encouraged patient/caregiver to call me with any questions as needed.

## 2022-01-31 RX ORDER — AMLODIPINE BESYLATE 2.5 MG/1
2.5 TABLET ORAL NIGHTLY
Qty: 30 TABLET | Refills: 3 | Status: SHIPPED | OUTPATIENT
Start: 2022-01-31 | End: 2022-06-09 | Stop reason: SDUPTHER

## 2022-01-31 NOTE — DISCHARGE SUMMARY
[facility]  Orthopedics  Discharge Summary      Patient Name: Crow Green  MRN: [id number]  Admission Date: 1/10/2022  Hospital Length of Stay: 0 days  Discharge Date and Time: 1/11/2022  6:38 PM  Attending Physician: No att. providers found   Discharging Provider: Anthony Alvarado MD  Primary Care Provider: Mateo Lambert DO    HPI:   He was admitted for observation after left reverse total shoulder arthroplasty.    Procedure(s) (LRB):  ARTHROPLASTY, SHOULDER, TOTAL, REVERSE (Left)      Hospital Course:  Patient admitted for post-op observation following left reverse total shoulder arthroplasty. Pain was reasonably controlled on oral medications. He completed post-op antibiotics. On POD#1 he was determined to be ready for discharge home.    Goals of Care Treatment Preferences:  Code Status: Full Code          Significant Diagnostic Studies: Labs: All labs within the past 24 hours have been reviewed    Pending Diagnostic Studies:     None        There are no hospital problems to display for this patient.     Discharged Condition: good    Disposition: Home-Health [facility]    Follow Up:    Patient Instructions:   No discharge procedures on file.  Medications:  Reconciled Home Medications:      Medication List      START taking these medications    acetaminophen 500 MG tablet  Commonly known as: TYLENOL  Take 2 tablets (1,000 mg total) by mouth every 8 (eight) hours as needed for Pain.     oxyCODONE 5 MG immediate release tablet  Commonly known as: ROXICODONE  Take 1 tablet (5 mg total) by mouth every 4 (four) hours as needed for Pain (post-op pain).     traMADoL 50 mg tablet  Commonly known as: ULTRAM  Take 1 tablet (50 mg total) by mouth every 4 to 6 hours as needed for Pain.        CHANGE how you take these medications    amLODIPine 2.5 MG tablet  Commonly known as: NORVASC  Take 1 tablet (2.5 mg total) by mouth once daily.  What changed: when to take this     azaTHIOprine 50 mg Tab  Commonly  known as: IMURAN  Take 1 tablet (50 mg total) by mouth once daily.  What changed: Another medication with the same name was removed. Continue taking this medication, and follow the directions you see here.        CONTINUE taking these medications    albuterol 2.5 mg /3 mL (0.083 %) nebulizer solution  Commonly known as: PROVENTIL  Take 3 mLs (2.5 mg total) by nebulization every 4 to 6 hours as needed for Wheezing or Shortness of Breath.     allopurinoL 100 MG tablet  Commonly known as: ZYLOPRIM  Take 1 tablet (100 mg total) by mouth once daily.     aspirin 81 MG EC tablet  Commonly known as: ECOTRIN  Take 1 tablet (81 mg total) by mouth once daily.     atorvastatin 40 MG tablet  Commonly known as: LIPITOR  Take 1 tablet (40 mg total) by mouth every evening.     baclofen 10 MG tablet  Commonly known as: LIORESAL  TAKE 1 TABLET EVERY DAY     blood sugar diagnostic Strp  1 each by Misc.(Non-Drug; Combo Route) route 4 (four) times daily.     blood-glucose meter kit  Commonly known as: TRUE METRIX GLUCOSE METER  Use as instructed to test blood glucose meter daily.     colchicine 0.6 mg tablet  Commonly known as: COLCRYS  TAKE 1 TABLET (0.6 MG TOTAL) BY MOUTH ONCE DAILY.     diclofenac sodium 1 % Gel  Commonly known as: VOLTAREN  APPLY 2 GRAMS TOPICALLY FOUR TIMES DAILY     ELIQUIS 5 mg Tab  Generic drug: apixaban  TAKE 1 TABLET TWICE DAILY     empagliflozin 25 mg tablet  Commonly known as: JARDIANCE  Take 1 tablet (25 mg total) by mouth once daily.     famotidine 20 MG tablet  Commonly known as: PEPCID  Take 1 tablet (20 mg total) by mouth 2 (two) times daily.     fentaNYL 50 mcg/mL injection  Commonly known as: SUBLIMAZE     fluticasone-salmeterol 250-50 mcg/dose 250-50 mcg/dose diskus inhaler  Commonly known as: ADVAIR DISKUS  Inhale 1 puff into the lungs 2 (two) times daily. Controller     furosemide 40 MG tablet  Commonly known as: LASIX  Take 1 tablet (40 mg total) by mouth once daily. TAKE 2 TABLETS EVERY MORNING   AND TAKE 1 TABLET EVERY EVENING     gabapentin 800 MG tablet  Commonly known as: NEURONTIN  Take 1 tablet (800 mg total) by mouth 3 (three) times daily.     heparin (porcine) in 5 % dex 25,000 unit/250 mL(100 unit/mL) infusion     insulin aspart U-100 100 unit/mL (3 mL) Inpn pen  Commonly known as: NovoLOG Flexpen U-100 Insulin  INJECT 10 UNITS SUBCUTANEOUSLY THREE TIMES DAILY WITH MEALS     insulin degludec 200 unit/mL (3 mL) insulin pen  Commonly known as: TRESIBA FLEXTOUCH U-200  Inject 40 Units into the skin once daily.     ipratropium 0.02 % nebulizer solution  Commonly known as: ATROVENT  INHALE THE CONTENTS OF 1 VIAL VIA NEBULIZER FOUR TIMES DAILY     isosorbide mononitrate 60 MG 24 hr tablet  Commonly known as: IMDUR  TAKE 1 TABLET EVERY DAY     lancets Misc  1 each by Misc.(Non-Drug; Combo Route) route 4 (four) times daily.     lisinopriL 10 MG tablet  Take 10 mg by mouth once daily.     magnesium oxide 400 mg (241.3 mg magnesium) tablet  Commonly known as: MAG-OX  Take 1 tablet (400 mg total) by mouth once daily.     metoprolol succinate 50 MG 24 hr tablet  Commonly known as: TOPROL-XL  Take 50 mg by mouth every evening.     midazolam 1 mg/mL injection  Commonly known as: VERSED     naproxen 500 MG EC tablet  Commonly known as: EC NAPROSYN  TAKE 1 TABLET(500 MG) BY MOUTH TWICE DAILY WITH MEALS FOR 14 DAYS FOR SHOULDER OR BACK PAIN     nitroGLYCERIN 0.4 MG SL tablet  Commonly known as: NITROSTAT  Place 1 tablet (0.4 mg total) under the tongue every 5 (five) minutes as needed for Chest pain.     OMNIPAQUE 240 240 mg iodine/mL injection  Generic drug: iohexoL     * OZEMPIC 1 mg/dose (2 mg/1.5 mL) Pnij  Generic drug: semaglutide  Inject 0.75 mLs (1 mg total) into the skin once a week.     * OZEMPIC 1 mg/dose (4 mg/3 mL)  Generic drug: semaglutide  INJECT 1MG SUBCUTANEOUSLY ONE TIME WEEKLY     pantoprazole 40 MG tablet  Commonly known as: PROTONIX  Take 1 tablet (40 mg total) by mouth once daily.     ranolazine  500 MG Tb12  Commonly known as: RANEXA  Take 1 tablet (500 mg total) by mouth 2 (two) times daily.     sertraline 100 MG tablet  Commonly known as: ZOLOFT  Take 1 tablet (100 mg total) by mouth every evening.     SPIRIVA WITH HANDIHALER 18 mcg inhalation capsule  Generic drug: tiotropium  INHALE THE CONTENTS OF 1 CAPSULE ONE TIME DAILY (CONTROLLER)     tacrolimus 0.1 % ointment  Commonly known as: PROTOPIC  Apply topically 2 (two) times daily.     tamsulosin 0.4 mg Cap  Commonly known as: FLOMAX  Take 1 capsule (0.4 mg total) by mouth once daily.     traZODone 100 MG tablet  Commonly known as: DESYREL  TAKE 2 TABLETS EVERY EVENING     triamcinolone acetonide 0.1% 0.1 % ointment  Commonly known as: KENALOG  Apply topically 2 (two) times daily. Use on legs and back     VIOS AEROSOL DELIVERY SYSTEM Shefali  Generic drug: nebulizer and compressor  USE AS DIRESTED         * This list has 2 medication(s) that are the same as other medications prescribed for you. Read the directions carefully, and ask your doctor or other care provider to review them with you.            STOP taking these medications    azithromycin 250 MG tablet  Commonly known as: ZITHROMAX Z-HOLDEN     latanoprost 0.005 % ophthalmic solution     methylPREDNISolone 4 mg tablet  Commonly known as: MEDROL DOSEPACK        ASK your doctor about these medications    DEPO-MedroL 40 mg/mL injection  Generic drug: methylPREDNISolone acetate     food supplemt, lactose-reduced Liqd  Commonly known as: ENSURE  Take 118 mLs by mouth 3 (three) times daily with meals.     metFORMIN 500 MG ER 24hr tablet  Commonly known as: GLUCOPHAGE-XR  TAKE 2 TABLETS TWICE DAILY WITH MEALS          Plan:  Discharged home with home health  JASWINDER Joseph to be out of sling for pendulums, elbow, wrist, hand ROM  Continue eliquis 2.5mg BID x 2 more days then return to home dose  Follow up with me in clinic in 10-14 days     Anthony Alvarado MD  Orthopedics  [facility]

## 2022-02-03 ENCOUNTER — CLINICAL SUPPORT (OUTPATIENT)
Dept: DIABETES | Facility: CLINIC | Age: 67
End: 2022-02-03
Payer: MEDICARE

## 2022-02-03 ENCOUNTER — CLINICAL SUPPORT (OUTPATIENT)
Dept: REHABILITATION | Facility: HOSPITAL | Age: 67
End: 2022-02-03
Attending: STUDENT IN AN ORGANIZED HEALTH CARE EDUCATION/TRAINING PROGRAM
Payer: MEDICARE

## 2022-02-03 VITALS — HEIGHT: 65 IN | WEIGHT: 186.5 LBS | BODY MASS INDEX: 31.07 KG/M2

## 2022-02-03 DIAGNOSIS — E11.65 UNCONTROLLED TYPE 2 DIABETES MELLITUS WITH HYPERGLYCEMIA: Primary | ICD-10-CM

## 2022-02-03 DIAGNOSIS — M25.612 DECREASED RANGE OF MOTION OF LEFT SHOULDER: ICD-10-CM

## 2022-02-03 DIAGNOSIS — M75.102 ROTATOR CUFF TEAR ARTHROPATHY OF LEFT SHOULDER: ICD-10-CM

## 2022-02-03 DIAGNOSIS — Z96.612 S/P REVERSE TOTAL SHOULDER ARTHROPLASTY, LEFT: ICD-10-CM

## 2022-02-03 DIAGNOSIS — R29.898 ARM WEAKNESS: Primary | ICD-10-CM

## 2022-02-03 DIAGNOSIS — M12.812 ROTATOR CUFF TEAR ARTHROPATHY OF LEFT SHOULDER: ICD-10-CM

## 2022-02-03 PROBLEM — M25.619 DECREASED RANGE OF MOTION (ROM) OF SHOULDER: Status: ACTIVE | Noted: 2022-02-03

## 2022-02-03 PROCEDURE — 97163 PT EVAL HIGH COMPLEX 45 MIN: CPT | Mod: HCNC

## 2022-02-03 PROCEDURE — 97110 THERAPEUTIC EXERCISES: CPT | Mod: HCNC

## 2022-02-03 PROCEDURE — 99999 PR PBB SHADOW E&M-EST. PATIENT-LVL II: ICD-10-PCS | Mod: PBBFAC,HCNC,, | Performed by: DIETITIAN, REGISTERED

## 2022-02-03 PROCEDURE — G0108 PR DIAB MANAGE TRN  PER INDIV: ICD-10-PCS | Mod: HCNC,S$GLB,, | Performed by: DIETITIAN, REGISTERED

## 2022-02-03 PROCEDURE — G0108 DIAB MANAGE TRN  PER INDIV: HCPCS | Mod: HCNC,S$GLB,, | Performed by: DIETITIAN, REGISTERED

## 2022-02-03 PROCEDURE — 99999 PR PBB SHADOW E&M-EST. PATIENT-LVL II: CPT | Mod: PBBFAC,HCNC,, | Performed by: DIETITIAN, REGISTERED

## 2022-02-03 NOTE — Clinical Note
Pt is having low BG and not always feeling symptoms.  Dexcom report was from December, which was the last sensor that he had on.  Since then, he has been checking his BG 3-6 times a day and his fasting numbers have been low.  I advised decrease in Tresiba from 40 units to 36 units.  He will see you on Feb 10.

## 2022-02-03 NOTE — PROGRESS NOTES
"Diabetes Care Specialist Follow-up Note  Author: Ashely Son RD  Date: 2/3/2022    Program Intake  Reason for Diabetes Program Visit:: Intervention    Lab Results   Component Value Date    HGBA1C 6.7 (H) 12/02/2021     A1c Pre Diabetes Care Specialist Intervention:  6.6%    CURRENT DM MEDICATIONS:    Jardiance, 25 mg   Ozempic, 1 mg weekly - Wednesdays    Tresiba, 40 units   Novolog, 10 units before meals     Clinical    Weight: 84.6 kg (186 lb 8.2 oz)   Height: 5' 5" (165.1 cm)   Body mass index is 31.04 kg/m².  Wt loss of 4 lbs since last visit on 12/15/2021    Nutritional Status  Meal Plan 24 Hour Recall: Breakfast,Lunch,Dinner  Meal Plan 24 Hour Recall - Breakfast: none  Meal Plan 24 Hour Recall - Lunch: none  Meal Plan 24 Hour Recall - Dinner: 6p - ham sandwich; cranberry juice - pt sipped on 20 oz juice over several hours  Meal Plan 24 Hour Recall - Snack: 2 Tootsie Rolls     Physical activity/Exercise:   Pt has PT for shoulder 2-3 times per week     SMBG: checking 3-6 times a day  Fasting, 58-70, some up to 130   Post prandial, ~130 but has jumped to 230 when pt ate watermelon     Clarity report in media:    Reporting period: Dec 15 - Dec 26 (last time sensor was used)   Device used:  Phone brittnee   -----------------------------  Glucose Details  Average glucose: 111 mg/dL  Standard deviation: 44 mg/dL  -----------------------------  78% Time in range   6% High  <1% Very high  3% Low  12% Very low  -----------------------------  CGM Details  Sensor usage: 92%  Days with CGM data: 11/12    Obeservations/patterns:    Pattern of nighttime lows from 3:25a to 3:50a   Pattern of daytime lows from 7:40p to 11p      Diabetes Self-Support Plan    Diabetes Self-Management Support Plan  Exercise/nutrition:: other (see comments) (Use diabetes management guide to find serving sizes of carb foods. Cont to read nutrition labels on foods.)  Medication:: pharmacy  Review status:: Patient has selected and agrees to " support plan.    During today's follow-up visit,  the following areas required further assessment and content was provided/reviewed.    Based on today's diabetes care assessment, the following areas of need were identified:      Diabetes Self-Management Skills    Diabetes Disease Process/Treatment Options No   Nutrition/Healthy Eating Yes - see care plan    Medication   Yes -  Dexcom report from Dec showed 12% very low and 3% low. Recent fasting BG readings are 58-70.     Pt to decrease Tresiba dose from 40 units to 36 units to decrease hypoglycemia.     Home Blood Glucose Monitoring Yes - see care plan    Acute Complications No   Chronic Complications No    Psychosocial/Coping No        Today's interventions were provided through individual discussion, instruction, and written materials were provided.    Patient verbalized understanding of instruction and written materials.  Pt was able to return back demonstration of instructions today. Patient understood key points, needs reinforcement and further instruction.     Diabetes Self-Management Care Plan Review:  Diabetes Self-Management Care Plan Review and Evaluation of Progress:    During today's follow-up Angelo Diabetes Self-Management Care Plan progress was reviewed and progress was evaluated including his/her input. Crow has agreed to continue his/her journey to improve/maintain overall diabetes control by continuing to set health goals. See care plan progress below.      Care Plan: Diabetes Management   Updates made since 1/4/2022 12:00 AM      Problem: Healthy Eating       Goal: Eat 2-3 meals daily with 45-60g/3-4 servings of Carbohydrate per meal.    Start Date: 12/2/2021   Expected End Date: 12/2/2022   This Visit's Progress: Not met   Priority: High   Barriers: No Barriers Identified   Note:    Pt is not getting hungry often.   Discussed the need to eat enough carbohydrate when he is taking Novolog to prevent low BG.      Problem: Blood Glucose  Self-Monitoring       Goal: Patient agrees to check blood sugars using CGM    Start Date: 12/15/2021   Expected End Date: 12/15/2022   This Visit's Progress: Not met   Priority: High   Barriers: No Barriers Identified   Note:    Pt received his supply of sensors last week. His HH nurse inserted a new sensor and tried to start it, but it was not working.   Today, we removed the sensor and started a new one with a new transmitter, since the transmitter ID did not match and we were not sure if the transmitter he had was good.   Walked pt through to steps so he can start the next one on his own.   Reset password for Cl[employer] and activated sharing with clinic.          Follow Up Plan     Follow up if symptoms worsen or fail to improve.    Today's care plan and follow up schedule was discussed with patient.  Crow verbalized understanding of the care plan, goals, and agrees to follow up plan.        The patient was encouraged to communicate with his/her health care provider/physician and care team regarding his/her condition(s) and treatment.  I provided the patient with my contact information today and encouraged to contact me via phone or [facility]'s Patient Portal as needed.     Length of Visit   Total Time: 60 Minutes

## 2022-02-03 NOTE — LETTER
February 3, 2022      [place]  [address] [address]         Patient: Crow Green   MR Number: [id number]   YOB: 1955   Date of Visit: 2/3/2022       Dear Dr. Lambert:    Thank you for referring Crow for diabetes self-management education and support services. He has completed all components of our Diabetes Management Program. Below is a summary of his clinical outcomes and goal progress.    Patient Outcomes:    A1c Status:   Lab Results   Component Value Date    HGBA1C 6.7 (H) 12/02/2021    HGBA1C 6.7 (H) 07/08/2021       Care Plan: Diabetes Management   Updates made since 1/4/2022 12:00 AM      Problem: Healthy Eating       Goal: Eat 2-3 meals daily with 45-60g/3-4 servings of Carbohydrate per meal.    Start Date: 12/2/2021   Expected End Date: 12/2/2022   This Visit's Progress: Not met   Priority: High   Barriers: No Barriers Identified   Note:    Pt is not getting hungry often.   Discussed the need to eat enough carbohydrate when he is taking Novolog to prevent low BG.      Problem: Blood Glucose Self-Monitoring       Goal: Patient agrees to check blood sugars using CGM    Start Date: 12/15/2021   Expected End Date: 12/15/2022   This Visit's Progress: Not met   Priority: High   Barriers: No Barriers Identified   Note:    Pt received his supply of sensors last week. His HH nurse inserted a new sensor and tried to start it, but it was not working.   Today, we removed the sensor and started a new one with a new transmitter, since the transmitter ID did not match and we were not sure if the transmitter he had was good.   Walked pt through to steps so he can start the next one on his own.   Reset password for [employer] and activated sharing with clinic.          Follow up:   · Crow to attend medical appointments as scheduled  · Crow to update you on his DM education progress as needed      If you have questions, please do not hesitate to call me. I look forward to  providing additional education and support as needed.    Sincerely,    Ashely Son RD  Diabetes Care and

## 2022-02-03 NOTE — PLAN OF CARE
[facility]  Physical Therapy Initial Evaluation     Date: 2/3/2022   Name: Crow FISCHER [facility]  Clinic Number: [id number]    Therapy Diagnosis:   Encounter Diagnoses   Name Primary?    Rotator cuff tear arthropathy of left shoulder     S/P reverse total shoulder arthroplasty, left     Arm weakness Yes    Decreased range of motion of left shoulder      Physician: Anthony Alvarado*    Physician Orders: PT Eval and Treat   Medical Diagnosis from Referral:   M75.102,M12.812 (ICD-10-CM) - Rotator cuff tear arthropathy of left shoulder   Z96.612 (ICD-10-CM) - S/P reverse total shoulder arthroplasty, left     Evaluation Date: 2/3/2022  Authorization Period Expiration: 1/28/2023  Plan of Care Expiration: 4/3/2022  Progress Note Due: 3/3/2022  Visit # / Visits authorized: 1/1   FOTO: 1/1     Time In: 9:20am  Time Out: 10:00am  Total Appointment Time (timed & untimed codes): 40 minutes    Precautions: Standard, Diabetes and Fall    Surgery: S/P Left Reverse Total Shoulder - 1/10/2022    SUBJECTIVE   Date of onset: 1/10/2022  History of current condition - Crow reports: He hasn't been doing to good since surgery. He states he has been doing the exercise where he swings his arm outside the sling throughout the day. He states he has constant pain. He states he struggles to sleep, move around the house, and just do every day tasks.      Imaging, none    Prior Therapy: none since surgery   Social History: lives alone  Occupation: N/A  Prior Level of Function: reaching, lifting  Current Level of Function: in sling    Pain:  Current 9/10, worst 10/10, best 6/10   Location: left shoulder   Description: Sharp  Aggravating Factors: Night Time  Easing Factors: rest    Pts goals: decrease pain and regain motion     Medical History:   Past Medical History:   Diagnosis Date    Arthritis     Asthma     Atrial fibrillation     Atrial fibrillation with RVR 8/7/2019    CHF (congestive heart  failure)     Chronic obstructive pulmonary disease 8/5/2020    Depression     Dermatomyositis     Diabetes mellitus, type 2 Diagnosed in 2000    Elevated PSA     Follow up 7/30/2020    Hypertension     Myocardial infarction     2017    Sleep apnea        Surgical History:   Crow Green  has a past surgical history that includes Cholecystectomy; Ablation of arrhythmogenic focus for atrial fibrillation (N/A, 2/27/2020); Selective injection of anesthetic agent around lumbar spinal nerve root by transforaminal approach (Left, 6/11/2020); Toe amputation (Left, 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    Medications:   Crow has a current medication list which includes the following prescription(s): acetaminophen, albuterol, allopurinol, amlodipine, aspirin, atorvastatin, azathioprine, azathioprine, baclofen, blood sugar diagnostic, blood-glucose meter, colchicine, depo-medrol, diclofenac sodium, eliquis, empagliflozin, famotidine, fentanyl, fluticasone-salmeterol 250-50 mcg/dose, food supplemt, lactose-reduced, furosemide, gabapentin, heparin (porcine) in 5 % dex, hydrocodone-acetaminophen, insulin aspart u-100, insulin degludec, ipratropium, isosorbide mononitrate, lancets, latanoprost, lisinopril, magnesium oxide, metformin, metoprolol succinate, midazolam, naproxen, nitroglycerin, omnipaque 240, oxycodone, ozempic, ozempic, pantoprazole, ranolazine, sertraline, tacrolimus, tamsulosin, spiriva with handihaler, tramadol, trazodone, triamcinolone acetonide 0.1%, and vios aerosol delivery system.    Allergies:   Review of patient's allergies indicates:   Allergen Reactions    Protamine Hives     Urticaria, possible upper airway swelling        OBJECTIVE     Sensation: Sensation is intact to light touch    Hand Dominance: Right    (WFL: Within Functional Limits)      AROM/PROM   Right (degrees) Left (degrees)   Cervical Flexion WFL WFL   Cervical Extension WFL WFL   Cervical Sidebending WFL WFL   Cervical Rotation WFL WFL   Shoulder Flexion (180) 160 NT/125 pain   Shoulder Abduction (180) 160 NT/80 pain   Shoulder Extension (60) 50 0   Shoulder Internal Rotation (80) Sacrum NT/45 @30 pain   Shoulder External Rotation (90) T3 NT/30 @30 pain   Elbow Flexion (135) WFL WFL   Elbow Extension (0) WFL WFL       Joint Mobility   Right Left    Glenohumeral Distraction Normal Hypomobile   Glenohumeral Inferior Wister Normal Hypomobile   Glenohumeral Anterior Glide   Normal Hypomobile   Glenohumeral Posterior Glide  Normal Hypomobile     Strength   Right    Left   Shoulder Flexion 4-/5 NT   Shoulder Abduction 4-/5 NT   Shoulder Extension  4-/5 NT   Shoulder External Rotation  4-/5 NT   Shoulder Internal Rotation  4-/5 NT   Shoulder Protraction  4-/5 NT   Elbow Flexion 4/5 3+/5   Elbow Extension  4/5 3+/5   *right shoulder pain with strength testing also    Muscle Length: decreased upper trap    Palpation: all around left shoulder    Other: Pt presents with postural abnormalities which include: forward head and rounded shoulders        Function:     CMS Impairment/Limitation/Restriction for FOTO Shoulder Survey    Therapist reviewed FOTO scores for Crow Green on 2/3/2022.   FOTO documents entered into [email or web address] - see Media section.    Limitation Score: 51%     TREATMENT     Crow received therapeutic exercises to develop strength, endurance, ROM, flexibility, posture and core stabilization for 10 minutes including:  Tables Slides Flexion and Scaption [id number] each  Scap Retractions 30x    Crow received the following manual therapy techniques: Joint mobilizations, Myofacial release and Soft tissue Mobilization were applied to the: left shoulder for 0 minutes, including:    Crow participated in neuromuscular re-education activities to improve: Coordination, Sense,  Proprioception and Posture for 0 minutes. The following activities were included:    PATIENT EDUCATION AND HOME EXERCISES     Education provided:   - Table slides, shoulder retractions    Written Home Exercises Provided: yes.  Exercises were reviewed and Crow was able to demonstrate them prior to the end of the session.  Crow demonstrated good  understanding of the education provided.     See EMR under Patient Instructions for exercises provided 2/3/2022.    ASSESSMENT   Crow is a 66 y.o. male referred to outpatient Physical Therapy with a medical diagnosis of   M75.102,M12.812 (ICD-10-CM) - Rotator cuff tear arthropathy of left shoulder   Z96.612 (ICD-10-CM) - S/P reverse total shoulder arthroplasty, left     . The patient presents with impairments which include decreased ROM, decreased strength, decreased joint mobility, decreased muscle length, impaired coordination, postural abnormalities and decreased overall function.  These impairments are limiting patient's ability to reach, lift, work, sleep. Pt prognosis is Fair due to personal factors and co-morbidities listed below. Pt will benefit from skilled outpatient Physical Therapy to address the deficits stated above and in the chart below, provide pt/family education, and to maximize pt's level of independence.     Plan of care discussed with patient: Yes  Pt's spiritual, cultural and educational needs considered and patient is agreeable to the plan of care and goals as stated below:     Anticipated Barriers for therapy: chronic shoulder pain, prior inconsistency with therapy     Medical Necessity is demonstrated by the following  History  Co-morbidities and personal factors that may impact the plan of care Co-morbidities:   advanced age, CHF, coping style/mechanism, diabetes, difficulty sleeping, education level, high BMI, HTN, level of undertstanding of current condition and poor medication/medical compliance    Personal Factors:   age  coping  style  lifestyle     high   Examination  Body Structures and Functions, activity limitations and participation restrictions that may impact the plan of care Body Regions:   neck  back  upper extremities    Body Systems:    gross symmetry  ROM  strength  gross coordinated movement  heart rate  blood pressure  edema  scar formation    Participation Restrictions:   ADLs    Activity limitations:   Learning and applying knowledge  no deficits    General Tasks and Commands  no deficits    Communication  no deficits    Mobility  lifting and carrying objects  fine hand use (grasping/picking up)    Self care  washing oneself (bathing, drying, washing hands)  caring for body parts (brushing teeth, shaving, grooming)  toileting  dressing    Domestic Life  shopping  cooking  doing house work (cleaning house, washing dishes, laundry)    Interactions/Relationships  no deficits    Life Areas  employment    Community and Social Life  community life  recreation and leisure  Islam and spirituality         high   Clinical Presentation unstable clinical presentation with unpredictable characteristics high   Decision Making/ Complexity Score: high     Goals:  Short Term Goals: 4 weeks   1. Patient will improve shoulder flexion to 135 degrees in order to come out of sling.  2. Patient will improve shoulder abduction to 115 degrees in order to start light active range.  3. Patient will be independent with HEP in order to further progress and return to maximal function.  4. Pain rating at Worst: 5/10 in order to progress towards increased independence with activity.  5. Patient will improve postural awareness.     Long Term Goals: 8 weeks   1. Patient will improve shoulder strength to 4-/5 in order to perform ADLs with left arm.  2. Patient will improve elbow strength to 4/5 in order to hold small items.  3. Patient will improve shoulder flexion to 150 degrees in order to reach overhead.   4. Patient will improve shoulder abduciton to  145 degrees in order to reach into cabinets.   5. Patient will self report improvement to 40% limitation on the FOTO Shoulder Survey.     PLAN   Plan of care Certification: 2/3/2022 to 4/3/2022.    Outpatient Physical Therapy 2 times weekly for 8 weeks to include the following interventions: Manual Therapy, Moist Heat/ Ice, Neuromuscular Re-ed, Patient Education, Self Care, Therapeutic Activities and Therapeutic Exercise.     Rupert Sanchez, PT, DPT    I CERTIFY THE NEED FOR THESE SERVICES FURNISHED UNDER THIS PLAN OF TREATMENT AND WHILE UNDER MY CARE   Physician's comments:     Physician's Signature: ___________________________________________________

## 2022-02-04 ENCOUNTER — OUTPATIENT CASE MANAGEMENT (OUTPATIENT)
Dept: ADMINISTRATIVE | Facility: OTHER | Age: 67
End: 2022-02-04
Payer: MEDICARE

## 2022-02-04 ENCOUNTER — EXTERNAL HOME HEALTH (OUTPATIENT)
Dept: HOME HEALTH SERVICES | Facility: HOSPITAL | Age: 67
End: 2022-02-04
Payer: MEDICARE

## 2022-02-04 NOTE — LETTER
February 11, 2022    Crow FISCHER Norma  [address]  [facility] [address]             [facility] [address] [place] [address] Dear Mr. Green:      I am writing from the Outpatient Complex Care Management Department at [facility].  I have been unsuccessful at reaching you to follow up with you to see how you have been doing. I hope you find the resources previously provided to you helpful. Please contact me for further assistance.    I can be reached at [phone] Monday thru Friday from 8:00am to 4:30pm.  [facility] also has a program with a nurse available 24/7 to answer questions or provide medical advice.  [facility] can be reached at [phone].    Sincerely,         Amanda Whipple LCSW

## 2022-02-08 ENCOUNTER — DOCUMENT SCAN (OUTPATIENT)
Dept: HOME HEALTH SERVICES | Facility: HOSPITAL | Age: 67
End: 2022-02-08
Payer: MEDICARE

## 2022-02-08 RX ORDER — HYDROCODONE BITARTRATE AND ACETAMINOPHEN 5; 325 MG/1; MG/1
1 TABLET ORAL EVERY 6 HOURS PRN
Qty: 24 TABLET | Refills: 0 | Status: ON HOLD | OUTPATIENT
Start: 2022-02-08 | End: 2022-03-05 | Stop reason: HOSPADM

## 2022-02-09 ENCOUNTER — TELEPHONE (OUTPATIENT)
Dept: PRIMARY CARE CLINIC | Facility: CLINIC | Age: 67
End: 2022-02-09
Payer: MEDICARE

## 2022-02-09 NOTE — PROGRESS NOTES
JOLIE Hair contacted Mr. Green to do the six month follow-up visit with [facility] program. No answer, CHW left a voicemail message requesting a call back.

## 2022-02-10 ENCOUNTER — DOCUMENT SCAN (OUTPATIENT)
Dept: HOME HEALTH SERVICES | Facility: HOSPITAL | Age: 67
End: 2022-02-10
Payer: MEDICARE

## 2022-02-10 ENCOUNTER — TELEPHONE (OUTPATIENT)
Dept: INTERNAL MEDICINE | Facility: CLINIC | Age: 67
End: 2022-02-10

## 2022-02-10 ENCOUNTER — DOCUMENTATION ONLY (OUTPATIENT)
Dept: REHABILITATION | Facility: HOSPITAL | Age: 67
End: 2022-02-10

## 2022-02-10 ENCOUNTER — CLINICAL SUPPORT (OUTPATIENT)
Dept: REHABILITATION | Facility: HOSPITAL | Age: 67
End: 2022-02-10
Payer: MEDICARE

## 2022-02-10 DIAGNOSIS — R29.898 ARM WEAKNESS: ICD-10-CM

## 2022-02-10 DIAGNOSIS — M25.612 DECREASED RANGE OF MOTION OF LEFT SHOULDER: ICD-10-CM

## 2022-02-10 PROCEDURE — 97110 THERAPEUTIC EXERCISES: CPT | Mod: HCNC,CQ

## 2022-02-10 PROCEDURE — 97140 MANUAL THERAPY 1/> REGIONS: CPT | Mod: HCNC,CQ

## 2022-02-10 RX ORDER — METOPROLOL SUCCINATE 50 MG/1
50 TABLET, EXTENDED RELEASE ORAL NIGHTLY
Qty: 90 TABLET | Refills: 3 | Status: ON HOLD | OUTPATIENT
Start: 2022-02-10 | End: 2022-09-25 | Stop reason: HOSPADM

## 2022-02-10 NOTE — TELEPHONE ENCOUNTER
Refill Routing Note   Medication(s) are not appropriate for processing by [facility] for the following reason(s):      - Medication requested has undergone a recent dosage adjustment (<3 months)  - Drug-Disease Interaction (metoprolol succinate and Postprocedural hypotension; Hypotension due to medication)    [facility] action(s):  Defer Medication-related problems identified:   Drug-disease interaction  Requires labs  Dose adjustment     Medication Therapy Plan: Pt in first 3 months of a dosage adjustment; DDI; Labs(uric acid); defer to you  --->Care Gap information included in message below if applicable.   Medication reconciliation completed: No   Automatic Epic Generated Protocol Data:        Requested Prescriptions   Pending Prescriptions Disp Refills    metoprolol succinate (TOPROL-XL) 50 MG 24 hr tablet [Pharmacy Med Name: METOPROLOL SUCCINATE ER 50 MG Tablet Extended Release 24 Hour] 90 tablet 3     Sig: Take 1 tablet (50 mg total) by mouth every evening.       Cardiovascular:  Beta Blockers Passed - 1/27/2022  3:31 PM        Passed - Patient is at least 18 years old        Passed - Last BP in normal range within 360 days     BP Readings from Last 1 Encounters:   01/11/22 127/79               Passed - Last Heart Rate in normal range within 360 days     Pulse Readings from Last 1 Encounters:   01/11/22 86              Passed - Valid encounter within last 15 months     Recent Visits  Date Type Provider Dept   11/15/21 Office Visit Mateo Lambert, DO [facility] Internal Medicine   07/26/21 Office Visit Mateo Lambert, DO [facility] Internal Medicine   07/08/21 Office Visit Mateo Lambert, DO [facility]   06/23/21 Office Visit Mateo Lambert, DO [facility]   11/24/20 Office Visit Mateo Lambert, DO [facility] Internal Medicine   10/01/20 Office Visit Mateo Lambert, DO [facility] Internal Medicine   08/05/20 Office Visit Mateo Lambert, DO [facility] Internal Medicine   02/21/20 Office Visit Mateo  SOILA Lambert, DO [facility] Internal Medicine   Showing recent visits within past 720 days and meeting all other requirements  Future Appointments  No visits were found meeting these conditions.  Showing future appointments within next 150 days and meeting all other requirements      Future Appointments              Tomorrow Luis Antonio Gaines, PT [facility] 1st Fl, [place]    In 4 days Rupert W. Sanchez, PT [facility] 1st Fl, [place]    In 5 days Mateo Lambert, DO [facility], [place]    In 6 days Rupert W. Sanchez, PT [facility] 1st Fl, [place]    In 1 week Anthony Alvarado MD [facility] - Orthopedics 1st Fl, [place]    In 1 week Rupert W. Sanchez, PT [facility] 1st Fl, [place]    In 2 weeks Rupert W. Sanchez, PT [facility] 1st Fl, [place]    In 2 weeks Eduard Zhao MD [facility] - Cardiology 3rd Fl, [place]    In 2 weeks Carol Lao, PTA [facility] 1st Fl, [place]    In 3 weeks Rupert W. Sanchez, PT [facility] 1st Fl, [place]    In 3 weeks Rupert W. Sanchez, PT [facility] 1st Fl, [place]    In 3 weeks Barb Veras DPM [facility] - Podiatry 1st Fl, [place]    In 4 weeks Rupert W. Sanchez, PT [facility] 1st Fl, [place]    In 1 month Rupert W. Sanchez, PT [facility] 1st Fl, [place]    In 1 month Rupert W. Sanchez, PT [facility] 1st Fl, [place]    In 1 month Carol Lao, PTA [facility] 1st Fl, [place]    In 1 month Rupert W. Sanchez, PT [facility] 1st Fl, [place]    In 1 month Carol Lao, PTA [facility] 1st Fl, [place]    In 1 month Rupert W. Sanchez, PT [facility] 1st Fl, [place]    In 1 month Eduardo Castillo PA-C [facility] - Diabetes Mgmt 2nd Fl, [place]    In 3 months CHRIS Capps MD [facility] - Ophthalmology 3rd Fl, [place]    In 3 months  [facility] [place]    In 3 months Elizabeth Lejeune, NP [facility], [place]                      Appointments  past 12m or future 3m with PCP    Date Provider   Last Visit   11/15/2021 Mateo Lambert, DO   Next Visit   1/29/2022 Mateo Lambert, DO   ED visits in past 90 days: 0        Note composed:1:07 PM 02/10/2022

## 2022-02-10 NOTE — PROGRESS NOTES
[facility]   Physical Therapy Treatment Note     Name: Crow FISCHER [facility] Number: [id number]    Therapy Diagnosis:   Encounter Diagnoses   Name Primary?    Arm weakness     Decreased range of motion of left shoulder      Physician: Anthony Alvarado    Visit Date: 2/10/2022      Physician Orders: PT Eval and Treat   Medical Diagnosis from Referral:   M75.102,M12.812 (ICD-10-CM) - Rotator cuff tear arthropathy of left shoulder   Z96.612 (ICD-10-CM) - S/P reverse total shoulder arthroplasty, left      Evaluation Date: 2/3/2022  Authorization Period Expiration: 1/28/2023  Plan of Care Expiration: 4/3/2022  Progress Note Due: 3/3/2022  Visit # / Visits authorized: 1/20   FOTO: 1/1         PTA Visit #: 1/5       Precautions: Standard, Diabetes and Fall     Surgery: S/P Left Reverse Total Shoulder - 1/10/2022    Time In: 0945  Time Out: 1030  Total Billable Time: 36 minutes (Billing reflects 1 on 1 treatment time spent with patient)     SUBJECTIVE     Patient reports: missed yesterday appointment due to transportation not showing up. Ran out of pain medication and once was refilled, it was 1/2 the dose as previous prescription which did not work.     He was compliant with home exercise program with pain.    Response to previous treatment: felt pretty good yesterday.     Functional change: decreased sleep, unable to cook, clean home and hard to shop for groceries    Pain: 9/10     Location: anterior left shoulder joint    OBJECTIVE     Objective Measures updated at progress report unless specified.       TREATMENT     Crow received the treatments listed below:       MANUAL THERAPY TECHNIQUES were applied for (20) minutes, including:    Manual Intervention Performed Today    Soft Tissue Mobilization x left supraspinatus , infraspinatus , teres major and minor , subscapularis  and deltoid   Joint Mobilizations     Mobilization with movement          Functional Dry Needling         Plan for Next Visit:      All passive range was performed to his tolerance in supine.     THERAPEUTIC EXERCISES to develop strength, endurance, ROM and flexibility for (16) minutes including:    Intervention Performed Today    Pendulums  x 2 minutes  X 2 each   Active range above/below joint x 10 minutes total   Table slides X  x 3  minutes Flexion  3 minutes abduction                              Plan for Next Visit:        PATIENT EDUCATION AND HOME EXERCISES     Home Exercises Provided and Patient Education Provided     Education provided: (during session) minutes   Patient educated on biomechanical justification for therapeutic exercise and importance of compliance with HEP in order to improve overall impairments and QOL    Patient was educated on all the above exercise prior/during/after for proper posture, positioning, and execution for safe performance with home exercise program.     Written Home Exercises Provided: yes.  Exercises were reviewed and Crow was able to demonstrate them prior to the end of the session.  Crow demonstrated good  understanding of the education provided. See EMR under Patient Instructions for exercises provided during therapy sessions.      ASSESSMENT     Patient was initially hesitant to move his shoulder but with gentle passive movement, he was able to gradually increase his left shoulder range. He is able to recall 2 of 3 of his exercises without prompting.      Crow is progressing well towards his goals.   Pt prognosis is Fair.     Pt will continue to benefit from skilled outpatient physical therapy to address the deficits listed in the problem list box on initial evaluation, provide pt/family education and to maximize pt's level of independence in the home and community environment.     Pt's spiritual, cultural and educational needs considered and pt agreeable to plan of care and goals.       Anticipated Barriers for therapy: chronic shoulder pain, prior  inconsistency with therapy       Goals:  Short Term Goals: 4 weeks   1. Patient will improve shoulder flexion to 135 degrees in order to come out of sling.  2. Patient will improve shoulder abduction to 115 degrees in order to start light active range.  3. Patient will be independent with HEP in order to further progress and return to maximal function.  4. Pain rating at Worst: 5/10 in order to progress towards increased independence with activity.  5. Patient will improve postural awareness.      Long Term Goals: 8 weeks   1. Patient will improve shoulder strength to 4-/5 in order to perform ADLs with left arm.  2. Patient will improve elbow strength to 4/5 in order to hold small items.  3. Patient will improve shoulder flexion to 150 degrees in order to reach overhead.   4. Patient will improve shoulder abduciton to 145 degrees in order to reach into cabinets.   5. Patient will self report improvement to 40% limitation on the FOTO Shoulder Survey.    Goals Key:  PC= progressing/continue; PM= partially met;        DC= discontinue    PLAN     Continue Plan of Care (POC) and progress per patient tolerance. See treatment section for details on planned progressions next session.      Gio Reeves, PTA

## 2022-02-10 NOTE — TELEPHONE ENCOUNTER
----- Message from Gilda Bloom sent at 2/10/2022  8:27 AM CST -----  Pt requesting a call back  from andrew , please give a call back at [phone]

## 2022-02-10 NOTE — PROGRESS NOTES
PT/PTA met face to face to discuss pt's treatment plan and progress towards established goals. Pt will be seen by a physical therapist minimally every 6th visit or every 30 days.        iGo Reeves PTA

## 2022-02-11 NOTE — PROGRESS NOTES
2nd Attempt to complete SW follow-up for Outpatient Care Management; left message requesting a return call and LCSW mailed a letter with contact information requesting a return call.  OPCM RN notified.

## 2022-02-14 ENCOUNTER — PATIENT MESSAGE (OUTPATIENT)
Dept: PHARMACY | Facility: CLINIC | Age: 67
End: 2022-02-14
Payer: MEDICARE

## 2022-02-14 ENCOUNTER — TELEPHONE (OUTPATIENT)
Dept: INTERNAL MEDICINE | Facility: CLINIC | Age: 67
End: 2022-02-14
Payer: MEDICARE

## 2022-02-14 ENCOUNTER — CLINICAL SUPPORT (OUTPATIENT)
Dept: REHABILITATION | Facility: HOSPITAL | Age: 67
End: 2022-02-14
Payer: MEDICARE

## 2022-02-14 ENCOUNTER — PATIENT OUTREACH (OUTPATIENT)
Dept: DIABETES | Facility: CLINIC | Age: 67
End: 2022-02-14
Payer: MEDICARE

## 2022-02-14 DIAGNOSIS — M25.612 DECREASED RANGE OF MOTION OF LEFT SHOULDER: ICD-10-CM

## 2022-02-14 DIAGNOSIS — R29.898 ARM WEAKNESS: ICD-10-CM

## 2022-02-14 PROCEDURE — 97110 THERAPEUTIC EXERCISES: CPT | Mod: HCNC

## 2022-02-14 PROCEDURE — 97140 MANUAL THERAPY 1/> REGIONS: CPT | Mod: HCNC

## 2022-02-14 NOTE — PROGRESS NOTES
Pt stopped by clinic for help with new Dexcom sensor  He was unsure about how to add the sensor code  Helped him put his new password for Dexcom into his phone brittnee  Reviewed steps for adding the sensor code  Pt had to leave since transportation was here  He will call if he has issues at home.

## 2022-02-14 NOTE — PROGRESS NOTES
[facility]   Physical Therapy Treatment Note     Name: Crow FISCHER [facility] Number: [id number]    Therapy Diagnosis:   Encounter Diagnoses   Name Primary?    Arm weakness     Decreased range of motion of left shoulder      Physician: Anthony Alvarado    Visit Date: 2/14/2022      Physician Orders: PT Eval and Treat   Medical Diagnosis from Referral:   M75.102,M12.812 (ICD-10-CM) - Rotator cuff tear arthropathy of left shoulder   Z96.612 (ICD-10-CM) - S/P reverse total shoulder arthroplasty, left      Evaluation Date: 2/3/2022  Authorization Period Expiration: 1/28/2023  Plan of Care Expiration: 4/3/2022  Progress Note Due: 3/3/2022  Visit # / Visits authorized: 2/20 (+1 eval)   FOTO: 1/1     PTA Visit #: 0/5     Precautions: Standard, Diabetes and Fall     Surgery: S/P Left Reverse Total Shoulder - 1/10/2022    Time In: 10:00am  Time Out: 1045am  Total Billable Time: 40 minutes (Billing reflects 1 on 1 treatment time spent with patient)     SUBJECTIVE     Patient reports: He is really hurting today     He was compliant with home exercise program with pain.    Response to previous treatment: felt pretty good yesterday.     Functional change: decreased sleep, unable to cook, clean home and hard to shop for groceries    Pain: 9/10     Location: anterior left shoulder joint    OBJECTIVE     Objective Measures updated at progress report unless specified.       TREATMENT     Crow received the treatments listed below:       MANUAL THERAPY TECHNIQUES were applied for (15) minutes, including:    Manual Intervention Performed Today    Soft Tissue Mobilization  left supraspinatus , infraspinatus , teres major and minor , subscapularis  and deltoid   Joint Mobilizations     Mobilization with movement x PROM Flexion, Abduction, and Rotation         Functional Dry Needling        Plan for Next Visit:      All passive range was performed to his tolerance in supine.     THERAPEUTIC  EXERCISES to develop strength, endurance, ROM and flexibility for (25) minutes including:    Intervention Performed Today    Pendulums  x 2 minutes  X 2 each   Bicep Curls x 3 minutes    Table slides X  x 3  minutes Flexion  3 minutes Scaption   Scap Squeezes x 3x10   Shoulder Rolls x 3x10                    Plan for Next Visit:        PATIENT EDUCATION AND HOME EXERCISES     Home Exercises Provided and Patient Education Provided     Education provided: (during session) minutes   Patient educated on biomechanical justification for therapeutic exercise and importance of compliance with HEP in order to improve overall impairments and QOL    Patient was educated on all the above exercise prior/during/after for proper posture, positioning, and execution for safe performance with home exercise program.     Written Home Exercises Provided: yes.  Exercises were reviewed and Crow was able to demonstrate them prior to the end of the session.  Crow demonstrated good  understanding of the education provided. See EMR under Patient Instructions for exercises provided during therapy sessions.      ASSESSMENT     Shoulder rolls and scap squeezes were added today. Patient was very guarded with PROM and pain was the biggest limiting factor. He was able to get to 90 degrees of flexion and abduction passively before pain stopping more motion. He was advised to keep working on the tables slides and pendulums at home to help decrease tension and gain motion.     Crow is progressing well towards his goals.   Pt prognosis is Fair.     Pt will continue to benefit from skilled outpatient physical therapy to address the deficits listed in the problem list box on initial evaluation, provide pt/family education and to maximize pt's level of independence in the home and community environment.     Pt's spiritual, cultural and educational needs considered and pt agreeable to plan of care and goals.       Anticipated Barriers for  therapy: chronic shoulder pain, prior inconsistency with therapy       Goals:  Short Term Goals: 4 weeks   1. Patient will improve shoulder flexion to 135 degrees in order to come out of sling.  2. Patient will improve shoulder abduction to 115 degrees in order to start light active range.  3. Patient will be independent with HEP in order to further progress and return to maximal function.  4. Pain rating at Worst: 5/10 in order to progress towards increased independence with activity.  5. Patient will improve postural awareness.      Long Term Goals: 8 weeks   1. Patient will improve shoulder strength to 4-/5 in order to perform ADLs with left arm.  2. Patient will improve elbow strength to 4/5 in order to hold small items.  3. Patient will improve shoulder flexion to 150 degrees in order to reach overhead.   4. Patient will improve shoulder abduciton to 145 degrees in order to reach into cabinets.   5. Patient will self report improvement to 40% limitation on the FOTO Shoulder Survey.    Goals Key:  PC= progressing/continue; PM= partially met;        DC= discontinue    PLAN     Continue Plan of Care (POC) and progress per patient tolerance. See treatment section for details on planned progressions next session.      Rupert Sanchez, PT, DPT

## 2022-02-14 NOTE — TELEPHONE ENCOUNTER
----- Message from Kirstin Montano sent at 2/14/2022  3:16 PM CST -----  Contact: Patient  Patient called to consult with nurse or staff regarding the patients medication. He states he will be bringing his medication with him. Patient would like a call back and can be reached at [phone]. Thanks/MR

## 2022-02-15 ENCOUNTER — OFFICE VISIT (OUTPATIENT)
Dept: INTERNAL MEDICINE | Facility: CLINIC | Age: 67
End: 2022-02-15
Payer: MEDICARE

## 2022-02-15 VITALS
RESPIRATION RATE: 17 BRPM | SYSTOLIC BLOOD PRESSURE: 116 MMHG | WEIGHT: 194.25 LBS | OXYGEN SATURATION: 97 % | TEMPERATURE: 98 F | HEIGHT: 65 IN | DIASTOLIC BLOOD PRESSURE: 78 MMHG | BODY MASS INDEX: 32.36 KG/M2 | HEART RATE: 81 BPM

## 2022-02-15 DIAGNOSIS — E11.42 DIABETIC POLYNEUROPATHY ASSOCIATED WITH TYPE 2 DIABETES MELLITUS: Primary | ICD-10-CM

## 2022-02-15 DIAGNOSIS — M75.102 ROTATOR CUFF TEAR ARTHROPATHY OF LEFT SHOULDER: ICD-10-CM

## 2022-02-15 DIAGNOSIS — M12.812 ROTATOR CUFF TEAR ARTHROPATHY OF LEFT SHOULDER: ICD-10-CM

## 2022-02-15 DIAGNOSIS — Z91.89 AT RISK FOR MEDICATION NONCOMPLIANCE: ICD-10-CM

## 2022-02-15 DIAGNOSIS — Z12.11 COLON CANCER SCREENING: ICD-10-CM

## 2022-02-15 PROCEDURE — 1160F RVW MEDS BY RX/DR IN RCRD: CPT | Mod: HCNC,CPTII,S$GLB, | Performed by: FAMILY MEDICINE

## 2022-02-15 PROCEDURE — 3078F DIAST BP <80 MM HG: CPT | Mod: HCNC,CPTII,S$GLB, | Performed by: FAMILY MEDICINE

## 2022-02-15 PROCEDURE — 3008F PR BODY MASS INDEX (BMI) DOCUMENTED: ICD-10-PCS | Mod: HCNC,CPTII,S$GLB, | Performed by: FAMILY MEDICINE

## 2022-02-15 PROCEDURE — 99999 PR PBB SHADOW E&M-EST. PATIENT-LVL V: CPT | Mod: PBBFAC,HCNC,, | Performed by: FAMILY MEDICINE

## 2022-02-15 PROCEDURE — 3074F SYST BP LT 130 MM HG: CPT | Mod: HCNC,CPTII,S$GLB, | Performed by: FAMILY MEDICINE

## 2022-02-15 PROCEDURE — 3078F PR MOST RECENT DIASTOLIC BLOOD PRESSURE < 80 MM HG: ICD-10-PCS | Mod: HCNC,CPTII,S$GLB, | Performed by: FAMILY MEDICINE

## 2022-02-15 PROCEDURE — 4010F PR ACE/ARB THEARPY RXD/TAKEN: ICD-10-PCS | Mod: HCNC,CPTII,S$GLB, | Performed by: FAMILY MEDICINE

## 2022-02-15 PROCEDURE — 99999 PR PBB SHADOW E&M-EST. PATIENT-LVL V: ICD-10-PCS | Mod: PBBFAC,HCNC,, | Performed by: FAMILY MEDICINE

## 2022-02-15 PROCEDURE — 3008F BODY MASS INDEX DOCD: CPT | Mod: HCNC,CPTII,S$GLB, | Performed by: FAMILY MEDICINE

## 2022-02-15 PROCEDURE — 4010F ACE/ARB THERAPY RXD/TAKEN: CPT | Mod: HCNC,CPTII,S$GLB, | Performed by: FAMILY MEDICINE

## 2022-02-15 PROCEDURE — 1160F PR REVIEW ALL MEDS BY PRESCRIBER/CLIN PHARMACIST DOCUMENTED: ICD-10-PCS | Mod: HCNC,CPTII,S$GLB, | Performed by: FAMILY MEDICINE

## 2022-02-15 PROCEDURE — 1159F MED LIST DOCD IN RCRD: CPT | Mod: HCNC,CPTII,S$GLB, | Performed by: FAMILY MEDICINE

## 2022-02-15 PROCEDURE — 3074F PR MOST RECENT SYSTOLIC BLOOD PRESSURE < 130 MM HG: ICD-10-PCS | Mod: HCNC,CPTII,S$GLB, | Performed by: FAMILY MEDICINE

## 2022-02-15 PROCEDURE — 99214 OFFICE O/P EST MOD 30 MIN: CPT | Mod: HCNC,S$GLB,, | Performed by: FAMILY MEDICINE

## 2022-02-15 PROCEDURE — 1159F PR MEDICATION LIST DOCUMENTED IN MEDICAL RECORD: ICD-10-PCS | Mod: HCNC,CPTII,S$GLB, | Performed by: FAMILY MEDICINE

## 2022-02-15 PROCEDURE — 99214 PR OFFICE/OUTPT VISIT, EST, LEVL IV, 30-39 MIN: ICD-10-PCS | Mod: HCNC,S$GLB,, | Performed by: FAMILY MEDICINE

## 2022-02-15 RX ORDER — LISINOPRIL 30 MG/1
1 TABLET ORAL DAILY
COMMUNITY
Start: 2022-02-08 | End: 2022-07-18 | Stop reason: SDUPTHER

## 2022-02-15 NOTE — PROGRESS NOTES
Subjective:       Patient ID: Crow Green is a 66 y.o. male.    Chief Complaint: Diabetes and Shoulder Pain      HPI  Came in today for follow-up from recent left-sided rotator cuff surgery.  His main concern an challenge right now is continued postop pain but he was having lot of pain even before then.  I really encouraged him to try to start getting away from the narcotic pain medication in alternate naproxen along with Tylenol to help control the pain.  He is going to physical therapy at [facility].  Encouraged him to stay positive and do the home exercises which they have indicated for him.  He is sleeping in a hospital bed with his head elevated but still has some discomfort and frequently with that.    Diabetes Management Status    Statin: Taking  ACE/ARB: Taking    Screening or Prevention Patient's value Goal Complete/Controlled?   HgA1C Testing and Control   Lab Results   Component Value Date    HGBA1C 6.7 (H) 12/02/2021      Annually/Less than 8% Yes   Lipid profile : 05/25/2021 Annually Yes   LDL control Lab Results   Component Value Date    LDLCALC 62.6 (L) 05/25/2021    Annually/Less than 100 mg/dl  Yes   Nephropathy screening Lab Results   Component Value Date    LABMICR 1345.0 07/08/2021     Lab Results   Component Value Date    PROTEINUA 3+ (A) 08/03/2021     No results found for: UTPCR   Annually Yes   Blood pressure BP Readings from Last 1 Encounters:   02/15/22 116/78    Less than 140/90 Yes   Dilated retinal exam : 04/06/2021 Annually Yes   Foot exam   : 06/03/2021 Annually Yes       Family History   Problem Relation Age of Onset    Glaucoma Mother     Cataracts Mother     Diabetes Mother     Cataracts Father     Stroke Father     Glaucoma Sister         x3    Cataracts Sister         x3    Diabetes Sister         x1    Stroke Sister         x1    Glaucoma Maternal Aunt     Diabetes Maternal Aunt     No Known Problems Brother     No Known Problems Daughter     No Known Problems  Son     Cancer Maternal Grandfather         lung (smoker)    Prostate cancer Neg Hx     Heart disease Neg Hx     Kidney disease Neg Hx        Current Outpatient Medications:     acetaminophen (TYLENOL) 500 MG tablet, Take 2 tablets (1,000 mg total) by mouth every 8 (eight) hours as needed for Pain., Disp: 60 tablet, Rfl: 0    albuterol (PROVENTIL) 2.5 mg /3 mL (0.083 %) nebulizer solution, Take 3 mLs (2.5 mg total) by nebulization every 4 to 6 hours as needed for Wheezing or Shortness of Breath., Disp: 360 mL, Rfl: 11    allopurinoL (ZYLOPRIM) 100 MG tablet, Take 1 tablet (100 mg total) by mouth once daily., Disp: 90 tablet, Rfl: 3    amLODIPine (NORVASC) 2.5 MG tablet, Take 1 tablet (2.5 mg total) by mouth every evening., Disp: 30 tablet, Rfl: 3    aspirin (ECOTRIN) 81 MG EC tablet, Take 1 tablet (81 mg total) by mouth once daily., Disp: , Rfl: 0    atorvastatin (LIPITOR) 40 MG tablet, Take 1 tablet (40 mg total) by mouth every evening., Disp: 90 tablet, Rfl: 4    azaTHIOprine (IMURAN) 50 mg Tab, Take 1 tablet (50 mg total) by mouth once daily., Disp: 90 tablet, Rfl: 0    baclofen (LIORESAL) 10 MG tablet, TAKE 1 TABLET EVERY DAY, Disp: 90 tablet, Rfl: 0    blood sugar diagnostic Strp, 1 each by Misc.(Non-Drug; Combo Route) route 4 (four) times daily., Disp: 300 each, Rfl: 3    blood-glucose meter (TRUE METRIX GLUCOSE METER) kit, Use as instructed to test blood glucose meter daily., Disp: 1 each, Rfl: 1    colchicine (COLCRYS) 0.6 mg tablet, TAKE 1 TABLET (0.6 MG TOTAL) BY MOUTH ONCE DAILY., Disp: 90 tablet, Rfl: 0    diclofenac sodium (VOLTAREN) 1 % Gel, APPLY 2 GRAMS TOPICALLY FOUR TIMES DAILY, Disp: 600 g, Rfl: 2    ELIQUIS 5 mg Tab, TAKE 1 TABLET TWICE DAILY, Disp: 180 tablet, Rfl: 1    empagliflozin (JARDIANCE) 25 mg tablet, Take 1 tablet (25 mg total) by mouth once daily., Disp: 90 tablet, Rfl: 0    famotidine (PEPCID) 20 MG tablet, Take 1 tablet (20 mg total) by mouth 2 (two) times daily.,  Disp: 20 tablet, Rfl: 0    fluticasone-salmeterol diskus inhaler 250-50 mcg, Inhale 1 puff into the lungs 2 (two) times daily. Controller, Disp: 180 each, Rfl: 3    furosemide (LASIX) 40 MG tablet, Take 1 tablet (40 mg total) by mouth once daily. TAKE 2 TABLETS EVERY MORNING  AND TAKE 1 TABLET EVERY EVENING (Patient taking differently: TAKE 2 TABLETS EVERY MORNING  AND TAKE 1 TABLET EVERY EVENING), Disp: 270 tablet, Rfl: 3    gabapentin (NEURONTIN) 800 MG tablet, Take 1 tablet (800 mg total) by mouth 3 (three) times daily., Disp: 270 tablet, Rfl: 4    HYDROcodone-acetaminophen (NORCO) 5-325 mg per tablet, Take 1 tablet by mouth every 6 (six) hours as needed for Pain., Disp: 24 tablet, Rfl: 0    insulin aspart U-100 (NOVOLOG FLEXPEN U-100 INSULIN) 100 unit/mL (3 mL) InPn pen, INJECT 10 UNITS SUBCUTANEOUSLY THREE TIMES DAILY WITH MEALS, Disp: 15 mL, Rfl: 0    insulin degludec (TRESIBA FLEXTOUCH U-200) 200 unit/mL (3 mL) insulin pen, Inject 40 Units into the skin once daily. (Patient taking differently: Inject 38 Units into the skin once daily.), Disp: 6 pen, Rfl: 3    ipratropium (ATROVENT) 0.02 % nebulizer solution, INHALE THE CONTENTS OF 1 VIAL VIA NEBULIZER FOUR TIMES DAILY, Disp: 1 Box, Rfl: 0    isosorbide mononitrate (IMDUR) 60 MG 24 hr tablet, Take 1 tablet (60 mg total) by mouth once daily., Disp: 90 tablet, Rfl: 1    lancets Misc, 1 each by Misc.(Non-Drug; Combo Route) route 4 (four) times daily., Disp: 300 each, Rfl: 3    latanoprost 0.005 % ophthalmic solution, INSTILL 1 DROP INTO BOTH EYES ONE TIME DAILY, Disp: 5 mL, Rfl: 3    metoprolol succinate (TOPROL-XL) 50 MG 24 hr tablet, Take 1 tablet (50 mg total) by mouth every evening., Disp: 90 tablet, Rfl: 3    naproxen (EC NAPROSYN) 500 MG EC tablet, TAKE 1 TABLET(500 MG) BY MOUTH TWICE DAILY WITH MEALS, Disp: 28 tablet, Rfl: 0    nitroGLYCERIN (NITROSTAT) 0.4 MG SL tablet, Place 1 tablet (0.4 mg total) under the tongue every 5 (five) minutes as  needed for Chest pain., Disp: 50 tablet, Rfl: 12    OZEMPIC 1 mg/dose (2 mg/1.5 mL) PnIj, Inject 0.75 mLs (1 mg total) into the skin once a week., Disp: 3 mL, Rfl: 11    OZEMPIC 1 mg/dose (4 mg/3 mL), INJECT 1MG SUBCUTANEOUSLY ONE TIME WEEKLY, Disp: 9 pen, Rfl: 1    pantoprazole (PROTONIX) 40 MG tablet, Take 1 tablet (40 mg total) by mouth once daily., Disp: 30 tablet, Rfl: 0    ranolazine (RANEXA) 500 MG Tb12, Take 1 tablet (500 mg total) by mouth 2 (two) times daily., Disp: 60 tablet, Rfl: 11    sertraline (ZOLOFT) 100 MG tablet, Take 1 tablet (100 mg total) by mouth every evening., Disp: 90 tablet, Rfl: 2    tacrolimus (PROTOPIC) 0.1 % ointment, Apply topically 2 (two) times daily., Disp: 100 g, Rfl: 2    tamsulosin (FLOMAX) 0.4 mg Cap, Take 1 capsule (0.4 mg total) by mouth once daily., Disp: 30 capsule, Rfl: 11    tiotropium (SPIRIVA WITH HANDIHALER) 18 mcg inhalation capsule, INHALE THE CONTENTS OF 1 CAPSULE ONE TIME DAILY (CONTROLLER), Disp: 90 capsule, Rfl: 3    traZODone (DESYREL) 100 MG tablet, Take 2 tablets (200 mg total) by mouth every evening., Disp: 180 tablet, Rfl: 1    triamcinolone acetonide 0.1% (KENALOG) 0.1 % ointment, Apply topically 2 (two) times daily. Use on legs and back, Disp: 454 g, Rfl: 0    VIOS AEROSOL DELIVERY SYSTEM Shefali, USE AS DIRESTED, Disp: , Rfl: 0    DEPO-MEDROL 40 mg/mL injection, , Disp: , Rfl:     fentaNYL (SUBLIMAZE) 50 mcg/mL injection, , Disp: , Rfl:     food supplemt, lactose-reduced (ENSURE) Liqd, Take 118 mLs by mouth 3 (three) times daily with meals., Disp: 1 Bottle, Rfl: 4    heparin sodium,porcine/D5W (HEPARIN, PORCINE, IN 5 % DEX) 25,000 unit/250 mL(100 unit/mL) infusion, , Disp: , Rfl:     lisinopriL (PRINIVIL,ZESTRIL) 30 MG tablet, Take 1 tablet by mouth once daily., Disp: , Rfl:     magnesium oxide (MAG-OX) 400 mg (241.3 mg magnesium) tablet, Take 1 tablet (400 mg total) by mouth once daily., Disp: , Rfl: 0    midazolam (VERSED) 1 mg/mL  "injection, , Disp: , Rfl:     OMNIPAQUE 240 240 mg iodine/mL injection, , Disp: , Rfl:     Review of Systems   Constitutional: Negative for chills and fever.   Eyes: Negative for visual disturbance.   Respiratory: Negative for cough and shortness of breath.    Cardiovascular: Negative for chest pain.   Gastrointestinal: Negative for abdominal pain.   Musculoskeletal: Positive for arthralgias.   Neurological: Negative for dizziness.       Objective:   /78 (BP Location: Right arm, Patient Position: Sitting, BP Method: Medium (Manual))   Pulse 81   Temp 98.2 °F (36.8 °C) (Temporal)   Resp 17   Ht 5' 5" (1.651 m)   Wt 88.1 kg (194 lb 3.6 oz)   SpO2 97%   BMI 32.32 kg/m²      Physical Exam  Vitals reviewed.   Constitutional:       Appearance: He is well-developed and well-nourished.   HENT:      Head: Normocephalic and atraumatic.   Eyes:      Conjunctiva/sclera: Conjunctivae normal.   Cardiovascular:      Rate and Rhythm: Normal rate.   Pulmonary:      Effort: Pulmonary effort is normal. No respiratory distress.   Musculoskeletal:         General: No edema.      Comments: Wearing left-sided shoulder sling.  Surgical wound with 3 Steri-Strips and no signs of erythema or wound separation.   Skin:     General: Skin is warm and dry.      Findings: No rash.   Neurological:      Mental Status: He is alert and oriented to person, place, and time.      Coordination: Coordination normal.   Psychiatric:         Mood and Affect: Mood and affect normal.         Behavior: Behavior normal.         Assessment & Plan     Problem List Items Addressed This Visit        Neuro    Diabetic polyneuropathy - Primary    Relevant Orders    Hemoglobin A1C    Comprehensive Metabolic Panel       Other    At risk for medication noncompliance    Current Assessment & Plan     Nurses spent greater than 20 minutes organizing his medication in to trace to help with adherence.         Rotator cuff tear arthropathy of left shoulder    " Current Assessment & Plan     Continue with physical therapy and minimize narcotic pain medication.           Other Visit Diagnoses     Colon cancer screening        Relevant Orders    Fecal Immunochemical Test (iFOBT)            Immunizations Administered on Date of Encounter - 2/15/2022     No immunizations on file.           No follow-ups on file.    Disclaimer:  This note may have been prepared using voice recognition software, it may have not been extensively proofed, as such there could be errors within the text such as sound alike errors.

## 2022-02-15 NOTE — ASSESSMENT & PLAN NOTE
Nurses spent greater than 20 minutes organizing his medication in to trace to help with adherence.

## 2022-02-16 ENCOUNTER — CLINICAL SUPPORT (OUTPATIENT)
Dept: REHABILITATION | Facility: HOSPITAL | Age: 67
End: 2022-02-16
Payer: MEDICARE

## 2022-02-16 DIAGNOSIS — M25.612 DECREASED RANGE OF MOTION OF LEFT SHOULDER: ICD-10-CM

## 2022-02-16 DIAGNOSIS — R29.898 ARM WEAKNESS: ICD-10-CM

## 2022-02-16 PROCEDURE — 97140 MANUAL THERAPY 1/> REGIONS: CPT | Mod: HCNC

## 2022-02-16 PROCEDURE — 97110 THERAPEUTIC EXERCISES: CPT | Mod: HCNC

## 2022-02-16 RX ORDER — HYDROCODONE BITARTRATE AND ACETAMINOPHEN 5; 325 MG/1; MG/1
1 TABLET ORAL EVERY 6 HOURS PRN
Qty: 24 TABLET | Refills: 0 | Status: SHIPPED | OUTPATIENT
Start: 2022-02-16 | End: 2022-03-11

## 2022-02-16 NOTE — PROGRESS NOTES
[facility]   Physical Therapy Treatment Note     Name: Crow FISCHER [facility] Number: [id number]    Therapy Diagnosis:   Encounter Diagnoses   Name Primary?    Arm weakness     Decreased range of motion of left shoulder      Physician: Anthony Alvarado    Visit Date: 2/16/2022      Physician Orders: PT Eval and Treat   Medical Diagnosis from Referral:   M75.102,M12.812 (ICD-10-CM) - Rotator cuff tear arthropathy of left shoulder   Z96.612 (ICD-10-CM) - S/P reverse total shoulder arthroplasty, left      Evaluation Date: 2/3/2022  Authorization Period Expiration: 1/28/2023  Plan of Care Expiration: 4/3/2022  Progress Note Due: 3/3/2022  Visit # / Visits authorized: 3/20 (+1 eval)   FOTO: 1/3    PTA Visit #: 0/5     Precautions: Standard, Diabetes and Fall     Surgery: S/P Left Reverse Total Shoulder - 1/10/2022    Time In: 10:30am  Time Out: 11:15am  Total Billable Time: 42 minutes (Billing reflects 1 on 1 treatment time spent with patient)     SUBJECTIVE     Patient reports: He is feeling alright today.     He was compliant with home exercise program with pain.    Response to previous treatment: felt pretty good yesterday.     Functional change: decreased sleep, unable to cook, clean home and hard to shop for groceries    Pain: 9/10     Location: anterior left shoulder joint    OBJECTIVE     Objective Measures updated at progress report unless specified.     Left Shoulder ROM: Flexion-135, Abduction-95, External Rotation- 45, Internal Rotation-45    TREATMENT     Crow received the treatments listed below:       MANUAL THERAPY TECHNIQUES were applied for (15) minutes, including:    Manual Intervention Performed Today    Soft Tissue Mobilization  left supraspinatus , infraspinatus , teres major and minor , subscapularis  and deltoid   Joint Mobilizations     Mobilization with movement x PROM Flexion, Abduction, and Rotation         Functional Dry Needling        Plan for  Next Visit:      All passive range was performed to his tolerance in supine.     THERAPEUTIC EXERCISES to develop strength, endurance, ROM and flexibility for (27) minutes including:    Intervention Performed Today    Pendulums  x 2 minutes  X 2 each   Bicep Curls x 3 minutes    Table slides x  x 3  minutes Flexion  3 minutes Scaption   Scap Squeezes x 3x10   Shoulder Rolls x 3x10   Shoulder Isometrics x [id number] all direction               Plan for Next Visit:        PATIENT EDUCATION AND HOME EXERCISES     Home Exercises Provided and Patient Education Provided     Education provided: (during session) minutes   Patient educated on biomechanical justification for therapeutic exercise and importance of compliance with HEP in order to improve overall impairments and QOL    Patient was educated on all the above exercise prior/during/after for proper posture, positioning, and execution for safe performance with home exercise program.     Written Home Exercises Provided: yes.  Exercises were reviewed and Crow was able to demonstrate them prior to the end of the session.  Crow demonstrated good  understanding of the education provided. See EMR under Patient Instructions for exercises provided during therapy sessions.      ASSESSMENT     Shoulder isometrics were added today to begin working on light strengthening. Patient's range of motion is improving but he was still advised to keep working on it at home each day. Pain is still one of the bigger limiters in patient progression but today he did tolerated it better.     Crow is progressing well towards his goals.   Pt prognosis is Fair.     Pt will continue to benefit from skilled outpatient physical therapy to address the deficits listed in the problem list box on initial evaluation, provide pt/family education and to maximize pt's level of independence in the home and community environment.     Pt's spiritual, cultural and educational needs considered and pt  agreeable to plan of care and goals.       Anticipated Barriers for therapy: chronic shoulder pain, prior inconsistency with therapy       Goals:  Short Term Goals: 4 weeks   1. Patient will improve shoulder flexion to 135 degrees in order to come out of sling.  2. Patient will improve shoulder abduction to 115 degrees in order to start light active range.  3. Patient will be independent with HEP in order to further progress and return to maximal function.  4. Pain rating at Worst: 5/10 in order to progress towards increased independence with activity.  5. Patient will improve postural awareness.      Long Term Goals: 8 weeks   1. Patient will improve shoulder strength to 4-/5 in order to perform ADLs with left arm.  2. Patient will improve elbow strength to 4/5 in order to hold small items.  3. Patient will improve shoulder flexion to 150 degrees in order to reach overhead.   4. Patient will improve shoulder abduciton to 145 degrees in order to reach into cabinets.   5. Patient will self report improvement to 40% limitation on the FOTO Shoulder Survey.    Goals Key:  PC= progressing/continue; PM= partially met;        DC= discontinue    PLAN     Continue Plan of Care (POC) and progress per patient tolerance. See treatment section for details on planned progressions next session.      Rupert Sanchez, PT, DPT

## 2022-02-18 NOTE — PROGRESS NOTES
Outpatient Care Management  Patient Not Qualified    Patient: Crow Green  MRN:  [id number]  Date of Service:  2/18/2022  Completed by:  Avni Potts RN    Chief Complaint   Patient presents with    OPCM RN First Follow-Up Attempt    OPCM RN Second Follow-Up Attempt     Letter sent requesting call back     OPCM RN Third Follow-Up Attempt    OPCM Chart Review     Social work still on case assisting with IOP,     Case Closure       Patient Summary              Unable to reach Mr. rGeen after three phone calls and one letter requesting call back. Instructions left for Mr. Green to contact me if he has any needs in the future. Avni Potts RN

## 2022-02-18 NOTE — PROGRESS NOTES
LCSW mailed letter after 2nd attempt for SW follow-up with contact information requesting a return call and pt has not reached out to this LCSW.  LCSW will close case and notified OPCM RN.

## 2022-02-22 ENCOUNTER — CLINICAL SUPPORT (OUTPATIENT)
Dept: DIABETES | Facility: CLINIC | Age: 67
End: 2022-02-22
Payer: MEDICARE

## 2022-02-22 ENCOUNTER — OFFICE VISIT (OUTPATIENT)
Dept: ORTHOPEDICS | Facility: CLINIC | Age: 67
End: 2022-02-22
Payer: MEDICARE

## 2022-02-22 VITALS — HEIGHT: 65 IN | BODY MASS INDEX: 32.32 KG/M2 | WEIGHT: 194 LBS

## 2022-02-22 VITALS — BODY MASS INDEX: 32.32 KG/M2 | WEIGHT: 194 LBS | HEIGHT: 65 IN

## 2022-02-22 DIAGNOSIS — Z96.612 S/P REVERSE TOTAL SHOULDER ARTHROPLASTY, LEFT: Primary | ICD-10-CM

## 2022-02-22 DIAGNOSIS — M75.102 ROTATOR CUFF TEAR ARTHROPATHY OF LEFT SHOULDER: Primary | ICD-10-CM

## 2022-02-22 DIAGNOSIS — E11.65 UNCONTROLLED TYPE 2 DIABETES MELLITUS WITH HYPERGLYCEMIA: Primary | ICD-10-CM

## 2022-02-22 DIAGNOSIS — M12.812 ROTATOR CUFF TEAR ARTHROPATHY OF LEFT SHOULDER: Primary | ICD-10-CM

## 2022-02-22 PROCEDURE — 99999 PR PBB SHADOW E&M-EST. PATIENT-LVL IV: ICD-10-PCS | Mod: PBBFAC,HCNC,, | Performed by: STUDENT IN AN ORGANIZED HEALTH CARE EDUCATION/TRAINING PROGRAM

## 2022-02-22 PROCEDURE — 3008F BODY MASS INDEX DOCD: CPT | Mod: HCNC,CPTII,S$GLB, | Performed by: STUDENT IN AN ORGANIZED HEALTH CARE EDUCATION/TRAINING PROGRAM

## 2022-02-22 PROCEDURE — 99024 PR POST-OP FOLLOW-UP VISIT: ICD-10-PCS | Mod: HCNC,S$GLB,, | Performed by: STUDENT IN AN ORGANIZED HEALTH CARE EDUCATION/TRAINING PROGRAM

## 2022-02-22 PROCEDURE — 99024 POSTOP FOLLOW-UP VISIT: CPT | Mod: HCNC,S$GLB,, | Performed by: STUDENT IN AN ORGANIZED HEALTH CARE EDUCATION/TRAINING PROGRAM

## 2022-02-22 PROCEDURE — 3008F PR BODY MASS INDEX (BMI) DOCUMENTED: ICD-10-PCS | Mod: HCNC,CPTII,S$GLB, | Performed by: STUDENT IN AN ORGANIZED HEALTH CARE EDUCATION/TRAINING PROGRAM

## 2022-02-22 PROCEDURE — G0108 DIAB MANAGE TRN  PER INDIV: HCPCS | Mod: PBBFAC | Performed by: DIETITIAN, REGISTERED

## 2022-02-22 PROCEDURE — 1159F MED LIST DOCD IN RCRD: CPT | Mod: HCNC,CPTII,S$GLB, | Performed by: STUDENT IN AN ORGANIZED HEALTH CARE EDUCATION/TRAINING PROGRAM

## 2022-02-22 PROCEDURE — 4010F PR ACE/ARB THEARPY RXD/TAKEN: ICD-10-PCS | Mod: HCNC,CPTII,S$GLB, | Performed by: STUDENT IN AN ORGANIZED HEALTH CARE EDUCATION/TRAINING PROGRAM

## 2022-02-22 PROCEDURE — 1160F RVW MEDS BY RX/DR IN RCRD: CPT | Mod: HCNC,CPTII,S$GLB, | Performed by: STUDENT IN AN ORGANIZED HEALTH CARE EDUCATION/TRAINING PROGRAM

## 2022-02-22 PROCEDURE — 99999 PR PBB SHADOW E&M-EST. PATIENT-LVL II: ICD-10-PCS | Mod: PBBFAC,,, | Performed by: DIETITIAN, REGISTERED

## 2022-02-22 PROCEDURE — 1159F PR MEDICATION LIST DOCUMENTED IN MEDICAL RECORD: ICD-10-PCS | Mod: HCNC,CPTII,S$GLB, | Performed by: STUDENT IN AN ORGANIZED HEALTH CARE EDUCATION/TRAINING PROGRAM

## 2022-02-22 PROCEDURE — 1160F PR REVIEW ALL MEDS BY PRESCRIBER/CLIN PHARMACIST DOCUMENTED: ICD-10-PCS | Mod: HCNC,CPTII,S$GLB, | Performed by: STUDENT IN AN ORGANIZED HEALTH CARE EDUCATION/TRAINING PROGRAM

## 2022-02-22 PROCEDURE — 99999 PR PBB SHADOW E&M-EST. PATIENT-LVL II: CPT | Mod: PBBFAC,,, | Performed by: DIETITIAN, REGISTERED

## 2022-02-22 PROCEDURE — 4010F ACE/ARB THERAPY RXD/TAKEN: CPT | Mod: HCNC,CPTII,S$GLB, | Performed by: STUDENT IN AN ORGANIZED HEALTH CARE EDUCATION/TRAINING PROGRAM

## 2022-02-22 PROCEDURE — 99212 OFFICE O/P EST SF 10 MIN: CPT | Mod: PBBFAC | Performed by: DIETITIAN, REGISTERED

## 2022-02-22 PROCEDURE — 99999 PR PBB SHADOW E&M-EST. PATIENT-LVL IV: CPT | Mod: PBBFAC,HCNC,, | Performed by: STUDENT IN AN ORGANIZED HEALTH CARE EDUCATION/TRAINING PROGRAM

## 2022-02-22 NOTE — PROGRESS NOTES
Orthopaedics  Post-operative follow-up    Procedure Performed:  Left Reverse Total Shoulder Arthroplasty     Date of Surgery: 01/10/2022    Subjective: Crow Green is now approximately 6 weeks out from his shoulder surgery.  He has been compliant with post-operative restrictions and has been progressing with PT.  His pain and function continue to improve though he does still report significant soreness with PT.    Exam:  Incision healed, no erythema or induration  No swelling or bruising noted  Fluid ROM with no crepitus  Active supine ROM: , ABD 90, ER 30  Passive upright ROM: , , ER 40  Axillary nerve sensation and motor intact  Motor and sensory intact distally  Strong radial pulse, fingers warm and well perfused    Imaging:  X-Ray Shoulder 2 or More Views Left  Narrative: EXAMINATION:  XR SHOULDER COMPLETE 2 OR MORE VIEWS LEFT    CLINICAL HISTORY:  Presence of left artificial shoulder joint    TECHNIQUE:  Two or three views of the left shoulder were performed.    COMPARISON:  01/10/2022 and 02/21/2020    FINDINGS:  Total arthroplasty findings present with anatomic alignment.  No acute osseous abnormality.  Left lung unchanged.  Impression: As above    Electronically signed by: Shamar Kovacs MD  Date:    01/25/2022  Time:    08:37        Impression:  S/P left Reverse Total Shoulder Arthroplasty, second post-operative visit - routine healing    Plan:  Advance PT per protocol  Symptomatic treatment for pain / swelling  May advance activities as reviewed today: discontinue sling  Instructed patient to call clinic if questions or concerns    Follow-up in 1 month with x-rays        Anthony Alvarado MD

## 2022-02-22 NOTE — PROGRESS NOTES
Diabetes Care Specialist Progress Note  Author: Ashely Son RD  Date: 2/22/2022    Program Intake  Reason for Diabetes Program Visit:: Intervention  Type of Intervention:: Individual  Education: Other (Help with Dexcom G6)    Lab Results   Component Value Date    HGBA1C 6.7 (H) 12/02/2021     CURRENT DM MEDICATIONS:   · Jardiance, 25 mg  · Ozempic, 1 mg weekly - Wednesdays   · Tresiba, 40 units - in am   · Novolog, 10 units before meals      Clinical    Nutritional Status  Meal Plan 24 Hour Recall - Breakfast: none  Meal Plan 24 Hour Recall - Lunch: pt drank cranberry juice throughout the day  Meal Plan 24 Hour Recall - Dinner: strawberries and 1 banana  Meal Plan 24 Hour Recall - Snack: none  Pt has poor appetite, but usually eats 2 meals per day     Today's interventions were provided through individual discussion, instruction, and written materials were provided.      Patient verbalized understanding of instruction and written materials.  Pt was able to return back demonstration of instructions today. Patient understood key points, needs reinforcement and further instruction.     Diabetes Self-Management Care Plan:    Today's Diabetes Self-Management Care Plan was developed with Crow's input. Crow has agreed to work toward the following goal(s) to improve his/her overall diabetes control.      Care Plan: Diabetes Management   Updates made since 1/23/2022 12:00 AM      Problem: Healthy Eating       Goal: Eat 2-3 meals daily with 45-60g/3-4 servings of Carbohydrate per meal.    Start Date: 12/2/2021   Expected End Date: 12/2/2022   Recent Progress: Not met   Priority: High   Barriers: No Barriers Identified   Note:    Pt is not getting hungry often.   Pt states that he is eating enough carbohydrate when he is taking Novolog to prevent low BG.      Problem: Blood Glucose Self-Monitoring       Goal: Patient agrees to check blood sugars using CGM    Start Date: 12/15/2021   Expected End Date: 12/15/2022    This Visit's Progress: Met   Recent Progress: Not met   Priority: High   Barriers: No Barriers Identified   Note:    Pt was unsure how to start a new sensor on his own.   Demonstrated how to check the transmitter ID to make sure it matches the transmitter he is using  Walked pt through to steps of entering sensor code into the Dexcom brittnee, inserting new sensor and starting sensor.    Pt feels that he will be able to start next sensor on his own.          Follow Up Plan     Follow up if symptoms worsen or fail to improve.    Today's care plan and follow up schedule was discussed with patient.  Crow verbalized understanding of the care plan, goals, and agrees to follow up plan.        The patient was encouraged to communicate with his/her health care provider/physician and care team regarding his/her condition(s) and treatment.  I provided the patient with my contact information today and encouraged to contact me via phone or [facility] Patient Portal as needed.     Length of Visit   Total Time: 30 Minutes

## 2022-02-24 ENCOUNTER — CLINICAL SUPPORT (OUTPATIENT)
Dept: REHABILITATION | Facility: HOSPITAL | Age: 67
End: 2022-02-24
Payer: MEDICARE

## 2022-02-24 DIAGNOSIS — M25.619 DECREASED RANGE OF MOTION OF SHOULDER, UNSPECIFIED LATERALITY: ICD-10-CM

## 2022-02-24 DIAGNOSIS — R29.898 ARM WEAKNESS: Primary | ICD-10-CM

## 2022-02-24 DIAGNOSIS — I10 ESSENTIAL HYPERTENSION: Primary | ICD-10-CM

## 2022-02-24 PROCEDURE — 97140 MANUAL THERAPY 1/> REGIONS: CPT | Mod: HCNC

## 2022-02-24 PROCEDURE — 97110 THERAPEUTIC EXERCISES: CPT | Mod: HCNC

## 2022-02-24 NOTE — PROGRESS NOTES
[facility]   Physical Therapy Treatment Note     Name: Crow FISCHER [facility] Number: [id number]    Therapy Diagnosis:   Encounter Diagnoses   Name Primary?    Arm weakness Yes    Decreased range of motion of shoulder, unspecified laterality      Physician: Anthony Alvarado    Visit Date: 2/24/2022      Physician Orders: PT Eval and Treat   Medical Diagnosis from Referral:   M75.102,M12.812 (ICD-10-CM) - Rotator cuff tear arthropathy of left shoulder   Z96.612 (ICD-10-CM) - S/P reverse total shoulder arthroplasty, left      Evaluation Date: 2/3/2022  Authorization Period Expiration: 1/28/2023  Plan of Care Expiration: 4/3/2022  Progress Note Due: 3/3/2022  Visit # / Visits authorized: 4/20 (+1 eval)   FOTO: 1/3    PTA Visit #: 0/5     Precautions: Standard, Diabetes and Fall     Surgery: S/P Left Reverse Total Shoulder - 1/10/2022    Time In: 9:25am  Time Out: 10:05am  Total Billable Time: 40 minutes (Billing reflects 1 on 1 treatment time spent with patient)     SUBJECTIVE     Patient reports: His shoulder is pretty sore. He states he may have over did it by doing stuff around his home.   He was compliant with home exercise program with pain.  Response to previous treatment: felt pretty good yesterday.   Functional change: decreased sleep, unable to cook, clean home and hard to shop for groceries    Pain: 9/10     Location: anterior left shoulder joint    OBJECTIVE     Objective Measures updated at progress report unless specified.     Left Shoulder ROM: Flexion-135, Abduction-135, External Rotation- 90, Internal Rotation-45    TREATMENT     Crow received the treatments listed below:       MANUAL THERAPY TECHNIQUES were applied for (15) minutes, including:    Manual Intervention Performed Today    Soft Tissue Mobilization  left supraspinatus , infraspinatus , teres major and minor , subscapularis  and deltoid   Joint Mobilizations     Mobilization with movement x PROM  Flexion, Abduction, and Rotation         Functional Dry Needling        Plan for Next Visit:      All passive range was performed to his tolerance in supine.     THERAPEUTIC EXERCISES to develop strength, endurance, ROM and flexibility for (25) minutes including:    Intervention Performed Today    Pendulums   2 minutes  X 2 each   Bicep Curls x 3 minutes    Table slides    3  minutes Flexion  3 minutes Scaption   Scap Retractions x 3x10 red band   Shoulder Isometrics x [id number] all direction   Pulleys Flexion and Abduction  x 3 minutes each    Wand Flexion and Abduction  x 3 minutes each   Shoulder Flexion x 2 minutes     Plan for Next Visit:        PATIENT EDUCATION AND HOME EXERCISES     Home Exercises Provided and Patient Education Provided     Education provided: (during session) minutes   Patient educated on biomechanical justification for therapeutic exercise and importance of compliance with HEP in order to improve overall impairments and QOL    Patient was educated on all the above exercise prior/during/after for proper posture, positioning, and execution for safe performance with home exercise program.     Written Home Exercises Provided: yes.  Exercises were reviewed and Crow was able to demonstrate them prior to the end of the session.  Crow demonstrated good  understanding of the education provided. See EMR under Patient Instructions for exercises provided during therapy sessions.      ASSESSMENT     Pulleys were added today to work on more active assisted range of motion. Banded retractions were added to begin higher level strengthening and scapula control. Shoulder flexion in standing was added but he was educated on only working through a range where he wasn't hiking his shoulder up. Mr Maria's range is improving but he was advised not to over do it at home with ADLs. He tolerated today's treatment well.     Crow is progressing well towards his goals.   Pt prognosis is Fair.     Pt will  continue to benefit from skilled outpatient physical therapy to address the deficits listed in the problem list box on initial evaluation, provide pt/family education and to maximize pt's level of independence in the home and community environment.     Pt's spiritual, cultural and educational needs considered and pt agreeable to plan of care and goals.       Anticipated Barriers for therapy: chronic shoulder pain, prior inconsistency with therapy       Goals:  Short Term Goals: 4 weeks   1. Patient will improve shoulder flexion to 135 degrees in order to come out of sling.  2. Patient will improve shoulder abduction to 115 degrees in order to start light active range.  3. Patient will be independent with HEP in order to further progress and return to maximal function.  4. Pain rating at Worst: 5/10 in order to progress towards increased independence with activity.  5. Patient will improve postural awareness.      Long Term Goals: 8 weeks   1. Patient will improve shoulder strength to 4-/5 in order to perform ADLs with left arm.  2. Patient will improve elbow strength to 4/5 in order to hold small items.  3. Patient will improve shoulder flexion to 150 degrees in order to reach overhead.   4. Patient will improve shoulder abduciton to 145 degrees in order to reach into cabinets.   5. Patient will self report improvement to 40% limitation on the FOTO Shoulder Survey.    Goals Key:  PC= progressing/continue; PM= partially met;        DC= discontinue    PLAN     Continue Plan of Care (POC) and progress per patient tolerance. See treatment section for details on planned progressions next session.      Rupert Sanchez, PT, DPT

## 2022-02-25 ENCOUNTER — PATIENT OUTREACH (OUTPATIENT)
Dept: ADMINISTRATIVE | Facility: OTHER | Age: 67
End: 2022-02-25
Payer: MEDICARE

## 2022-02-25 ENCOUNTER — HOSPITAL ENCOUNTER (OUTPATIENT)
Dept: CARDIOLOGY | Facility: HOSPITAL | Age: 67
Discharge: HOME | End: 2022-02-25
Attending: INTERNAL MEDICINE
Payer: MEDICARE

## 2022-02-25 ENCOUNTER — OFFICE VISIT (OUTPATIENT)
Dept: CARDIOLOGY | Facility: CLINIC | Age: 67
End: 2022-02-25
Payer: MEDICARE

## 2022-02-25 VITALS
DIASTOLIC BLOOD PRESSURE: 74 MMHG | HEART RATE: 78 BPM | BODY MASS INDEX: 32.28 KG/M2 | OXYGEN SATURATION: 100 % | SYSTOLIC BLOOD PRESSURE: 132 MMHG | WEIGHT: 194 LBS

## 2022-02-25 DIAGNOSIS — I48.0 PAF (PAROXYSMAL ATRIAL FIBRILLATION): Chronic | ICD-10-CM

## 2022-02-25 DIAGNOSIS — I10 ESSENTIAL HYPERTENSION: ICD-10-CM

## 2022-02-25 DIAGNOSIS — E11.621 DIABETIC ULCER OF TOE OF LEFT FOOT ASSOCIATED WITH TYPE 2 DIABETES MELLITUS, WITH NECROSIS OF MUSCLE: ICD-10-CM

## 2022-02-25 DIAGNOSIS — L97.523 DIABETIC ULCER OF TOE OF LEFT FOOT ASSOCIATED WITH TYPE 2 DIABETES MELLITUS, WITH NECROSIS OF MUSCLE: ICD-10-CM

## 2022-02-25 DIAGNOSIS — I25.118 CORONARY ARTERY DISEASE OF NATIVE ARTERY OF NATIVE HEART WITH STABLE ANGINA PECTORIS: Primary | Chronic | ICD-10-CM

## 2022-02-25 DIAGNOSIS — I50.32 CHRONIC DIASTOLIC HEART FAILURE: ICD-10-CM

## 2022-02-25 PROCEDURE — 99499 RISK ADDL DX/OHS AUDIT: ICD-10-PCS | Mod: S$PBB,,, | Performed by: INTERNAL MEDICINE

## 2022-02-25 PROCEDURE — 99214 OFFICE O/P EST MOD 30 MIN: CPT | Mod: PBBFAC | Performed by: INTERNAL MEDICINE

## 2022-02-25 PROCEDURE — 99999 PR PBB SHADOW E&M-EST. PATIENT-LVL IV: ICD-10-PCS | Mod: PBBFAC,,, | Performed by: INTERNAL MEDICINE

## 2022-02-25 PROCEDURE — 4010F PR ACE/ARB THEARPY RXD/TAKEN: ICD-10-PCS | Mod: CPTII,S$GLB,, | Performed by: INTERNAL MEDICINE

## 2022-02-25 PROCEDURE — 99214 PR OFFICE/OUTPT VISIT, EST, LEVL IV, 30-39 MIN: ICD-10-PCS | Mod: S$GLB,,, | Performed by: INTERNAL MEDICINE

## 2022-02-25 PROCEDURE — 93010 ELECTROCARDIOGRAM REPORT: CPT | Mod: ,,, | Performed by: INTERNAL MEDICINE

## 2022-02-25 PROCEDURE — 4010F ACE/ARB THERAPY RXD/TAKEN: CPT | Mod: CPTII,S$GLB,, | Performed by: INTERNAL MEDICINE

## 2022-02-25 PROCEDURE — 99999 PR PBB SHADOW E&M-EST. PATIENT-LVL IV: CPT | Mod: PBBFAC,,, | Performed by: INTERNAL MEDICINE

## 2022-02-25 PROCEDURE — 99499 UNLISTED E&M SERVICE: CPT | Mod: S$PBB,,, | Performed by: INTERNAL MEDICINE

## 2022-02-25 PROCEDURE — 93005 ELECTROCARDIOGRAM TRACING: CPT | Mod: HCNC

## 2022-02-25 PROCEDURE — 93010 EKG 12-LEAD: ICD-10-PCS | Mod: ,,, | Performed by: INTERNAL MEDICINE

## 2022-02-25 PROCEDURE — 99214 OFFICE O/P EST MOD 30 MIN: CPT | Mod: S$GLB,,, | Performed by: INTERNAL MEDICINE

## 2022-02-25 NOTE — PROGRESS NOTES
Subjective:   Patient ID:  Crow Green is a 66 y.o. male who presents for follow up of No chief complaint on file.      65 yo AAM, came in for routine f/u  PMH CAD nonobstructive and small vessel Dz per LHC done in 11/16, PAF s/p PVI in  by Dr. Brown, CHFpEF 45%, DM, hTN, HLD and obesity. Asthma and SANDRITA o nCPAP f/u with Dr. Alicea.  -11/2016, Pt was admitted to [facility] for cough with little sputum for 6 weeks. Had severe left chest pain only with cough and sitting up from the bed. No pain with exercise. Denied palpitation, dizziness, orthopnea, PND and syncope. Troponin up to 0.218 and trending down. Echo showed EF 45% and MPI showed anteroseptal positive. Subsequent LHC showed d2 30% lesion and otherwise demi Dz.   -03/2017, he was admitted to [facility] for acute cholelithiasis, s/p lap aidan. Pt had PAF during the admisssion and has been on amiodarone and Eliquis. BNP was up to 144 from 44 done five months ago.  -05/2018 at [facility], MPI showed SPECT images at rest and stress studies showed a moderate-sized, moderate anteroseptal perfusion defect and a large, severe inferior wall perfusion defect that are fixed. The gated stress study demonstrated a left ventricular ejection fraction of 35%, and ECHo showed EF 50%. Chest CTA showed no PE.  -2018, had urine drug presumptive cocaine positive but pt declined to use cocaine.   08/2019 afib with rvr during PFT, and admitted to [facility] due to weakness and dizziness.TROPONIN 0.07 AND FLAT. Converted to sinus after cardizem and BB. F/u with Dr. Brown on 08/15 and advised PVI if recurrent Afib.   echo showed EF 45%.  S/p Afib ablation in  by Dr. Brown  Lives with his dad  No chest pain, dyspnea, palpitation,   Dizziness if got up too fast. No syncope  Med compliance  Visiting nurse 3 times a week for PT.  No leg swelling   Weight stable      visit  05 and  twice admitted for chest pain. EKG no acute stt change. troponin 0.199 and flat.     ECHO EF 40%, MPI no ischemia  Today C/o cp 3 times a week. Lasted for seconds at rest. With SOB, dizziness.   Pt had quick chest pain for seconds when he was sitting in the office today.     08/2021 visit  Admitted again in  for chest pain  Drug screen cocaine positive. 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 No headache in PM.   NO recurrent rx. BP low. Visiting nurse helped to prepare the medications for 2 weeks and pt is medication compliant     visit  On scooter for few months due to hip pain and legs weakness  Breathing rx per pulm service.  No leg swelling, chest pain. Dizziness in AM. Med compliance    Interval history  Left shoulder pain improved after shoulder procedure and rehab  imbalance after the toe removal.   No chest angina pain. No NTG SL prn since nov 2021  SOB controlled by inhaler and breathing rx  No leg swelling. No smoking med compliance  EKG NSR and old anterior infarct          Past Medical History:   Diagnosis Date    Arthritis     Asthma     Atrial fibrillation     Atrial fibrillation with RVR 8/7/2019    CHF (congestive heart failure)     Chronic obstructive pulmonary disease 8/5/2020    Depression     Dermatomyositis     Diabetes mellitus, type 2 Diagnosed in 2000    Elevated PSA     Follow up 7/30/2020    Hypertension     Myocardial infarction     2017    Sleep apnea        Past Surgical History:   Procedure Laterality Date    ABLATION OF ARRHYTHMOGENIC FOCUS FOR ATRIAL FIBRILLATION N/A 2/27/2020    Procedure: Ablation atrial fibrillation;  Surgeon: Gio Brown MD;  Location: [facility] EP LAB;  Service: Cardiology;  Laterality: N/A;  afib, DAMIEN (cx if SR), PVI, PITO, anes, MB, 3 Prep    CHOLECYSTECTOMY      CLOSURE OF WOUND Left 7/1/2020    Procedure: CLOSURE, WOUND;  Surgeon: Barb Veras DPM;  Location: [facility] OR;  Service: Podiatry;   Laterality: Left;    INJECTION OF ANESTHETIC AGENT INTO SACROILIAC JOINT Left 3/24/2021    Procedure: Left BLOCK, SACROILIAC JOINT and bilateral rhomboid TPI;  Surgeon: Dillan Tsai MD;  Location: [facility] PAIN MGT;  Service: Pain Management;  Laterality: Left;    REVERSE TOTAL SHOULDER ARTHROPLASTY Left 1/10/2022    Procedure: ARTHROPLASTY, SHOULDER, TOTAL, REVERSE;  Surgeon: Anthony Alvarado MD;  Location: [facility] OR;  Service: Orthopedics;  Laterality: Left;  left reverse total shoulder arthroplasty     SELECTIVE INJECTION OF ANESTHETIC AGENT AROUND LUMBAR SPINAL NERVE ROOT BY TRANSFORAMINAL APPROACH Left 6/11/2020    Procedure: BLOCK, SPINAL NERVE ROOT, LUMBAR, SELECTIVE, TRANSFORAMINAL APPROACH Left L4-5, L5-S1 TFESI with RN IV sedation;  Surgeon: Dillan Tsai MD;  Location: [facility] PAIN MGT;  Service: Pain Management;  Laterality: Left;    TOE AMPUTATION Left 6/29/2020    Procedure: AMPUTATION, TOE;  Surgeon: Barb Veras DPM;  Location: [facility] OR;  Service: Podiatry;  Laterality: Left;  great toe       Social History     Tobacco Use    Smoking status: Never Smoker    Smokeless tobacco: Never Used   Substance Use Topics    Alcohol use: Yes     Comment: rare, maybe holidays    Drug use: Not Currently     Types: Cocaine       Family History   Problem Relation Age of Onset    Glaucoma Mother     Cataracts Mother     Diabetes Mother     Cataracts Father     Stroke Father     Glaucoma Sister         x3    Cataracts Sister         x3    Diabetes Sister         x1    Stroke Sister         x1    Glaucoma Maternal Aunt     Diabetes Maternal Aunt     No Known Problems Brother     No Known Problems Daughter     No Known Problems Son     Cancer Maternal Grandfather         lung (smoker)    Prostate cancer Neg Hx     Heart disease Neg Hx     Kidney disease Neg Hx          Review of Systems   Constitutional: Negative for decreased appetite, diaphoresis, fever, malaise/fatigue and night sweats.    HENT: Negative for nosebleeds.    Eyes: Negative for blurred vision and double vision.   Cardiovascular: Positive for dyspnea on exertion. Negative for claudication, irregular heartbeat, leg swelling, near-syncope, palpitations and syncope.   Respiratory: Negative for cough, shortness of breath, sleep disturbances due to breathing, snoring, sputum production and wheezing.    Endocrine: Negative for cold intolerance and polyuria.   Hematologic/Lymphatic: Does not bruise/bleed easily.   Skin: Negative for rash.   Musculoskeletal: Positive for arthritis, back pain and joint pain. Negative for falls, joint swelling and neck pain.   Gastrointestinal: Positive for nausea. Negative for abdominal pain, heartburn and vomiting.   Genitourinary: Negative for dysuria, frequency and hematuria.   Neurological: Positive for dizziness and headaches. Negative for difficulty with concentration, focal weakness, light-headedness, numbness, seizures and weakness.   Psychiatric/Behavioral: Negative for depression, memory loss and substance abuse. The patient does not have insomnia.    Allergic/Immunologic: Negative for HIV exposure and hives.       Objective:   Physical Exam  HENT:      Head: Normocephalic.   Eyes:      Pupils: Pupils are equal, round, and reactive to light.   Neck:      Thyroid: No thyromegaly.      Vascular: Normal carotid pulses. No carotid bruit or JVD.   Cardiovascular:      Rate and Rhythm: Normal rate and regular rhythm.  No extrasystoles are present.     Chest Wall: PMI is not displaced.      Pulses: Normal pulses.      Heart sounds: Normal heart sounds. No murmur heard.    No gallop. No S3 sounds.   Pulmonary:      Effort: No respiratory distress.      Breath sounds: Normal breath sounds. No stridor.   Abdominal:      General: Bowel sounds are normal.      Palpations: Abdomen is soft.      Tenderness: There is no abdominal tenderness. There is no rebound.   Musculoskeletal:      Comments:      Skin:      Findings: No rash.   Neurological:      Mental Status: He is alert and oriented to person, place, and time.   Psychiatric:         Behavior: Behavior normal.         Lab Results   Component Value Date    CHOL 116 (L) 05/25/2021    CHOL 133 05/24/2021    CHOL 128 05/21/2020     Lab Results   Component Value Date    HDL 31 (L) 05/25/2021    HDL 36 (L) 05/24/2021    HDL 46 05/21/2020     Lab Results   Component Value Date    LDLCALC 62.6 (L) 05/25/2021    LDLCALC 77.0 05/24/2021    LDLCALC 62.4 (L) 05/21/2020     Lab Results   Component Value Date    TRIG 112 05/25/2021    TRIG 100 05/24/2021    TRIG 98 05/21/2020     Lab Results   Component Value Date    CHOLHDL 26.7 05/25/2021    CHOLHDL 27.1 05/24/2021    CHOLHDL 35.9 05/21/2020       Chemistry        Component Value Date/Time     12/30/2021 0813    K 4.1 12/30/2021 0813     12/30/2021 0813    CO2 25 12/30/2021 0813    BUN 15 12/30/2021 0813    CREATININE 1.6 (H) 12/30/2021 0813     (H) 12/30/2021 0813        Component Value Date/Time    CALCIUM 9.2 12/30/2021 0813    ALKPHOS 86 12/30/2021 0813    AST 22 12/30/2021 0813    ALT 22 12/30/2021 0813    BILITOT 0.2 12/30/2021 0813    ESTGFRAFRICA 52 (A) 12/30/2021 0813    EGFRNONAA 45 (A) 12/30/2021 0813          Lab Results   Component Value Date    HGBA1C 6.7 (H) 12/02/2021     Lab Results   Component Value Date    TSH 1.529 07/27/2020     Lab Results   Component Value Date    INR 1.0 08/03/2021    INR 1.0 05/24/2021    INR 1.0 06/26/2020     Lab Results   Component Value Date    WBC 8.04 12/30/2021    HGB 10.0 (L) 12/30/2021    HCT 33.8 (L) 12/30/2021    MCV 81 (L) 12/30/2021     12/30/2021     BMP  Sodium   Date Value Ref Range Status   12/30/2021 142 136 - 145 mmol/L Final     Potassium   Date Value Ref Range Status   12/30/2021 4.1 3.5 - 5.1 mmol/L Final     Chloride   Date Value Ref Range Status   12/30/2021 105 95 - 110 mmol/L Final     CO2   Date Value Ref Range Status   12/30/2021 25  23 - 29 mmol/L Final     BUN   Date Value Ref Range Status   12/30/2021 15 8 - 23 mg/dL Final     Creatinine   Date Value Ref Range Status   12/30/2021 1.6 (H) 0.5 - 1.4 mg/dL Final     Calcium   Date Value Ref Range Status   12/30/2021 9.2 8.7 - 10.5 mg/dL Final     Anion Gap   Date Value Ref Range Status   12/30/2021 12 8 - 16 mmol/L Final     eGFR if    Date Value Ref Range Status   12/30/2021 52 (A) >60 mL/min/1.73 m^2 Final     eGFR if non    Date Value Ref Range Status   12/30/2021 45 (A) >60 mL/min/1.73 m^2 Final     Comment:     Calculation used to obtain the estimated glomerular filtration  rate (eGFR) is the CKD-EPI equation.        BNP  @LABRCNTIP(BNP,BNPTRIAGEBLO)@  @LABRCNTIP(troponini)@  CrCl cannot be calculated (Patient's most recent lab result is older than the maximum 7 days allowed.).  No results found in the last 24 hours.  No results found in the last 24 hours.  No results found in the last 24 hours.    Assessment:      1. Nonobstructive coronary artery disease of native artery of native heart    2. PAF (paroxysmal atrial fibrillation)    3. Essential hypertension    4. Chronic diastolic heart failure    5. Diabetic ulcer of toe of left foot associated with type 2 diabetes mellitus, with necrosis of muscle        Plan:   Continue ToprolXL 12.5 mg Lisinopril 30mg, imdur to 60 mg bid, anexa Lasix eliquis ASA Lipitor amlodipine for CAD, PAF CHF HTN and HLD  DM Rx per PCP  Counseled DASH  Check Lipid profile in 6 months  Recommend heart-healthy diet, weight control and regular exercise.  Agustin. Risk modification.   I have reviewed all pertinent labs and cardiac studies independently. Plans and recommendations have been formulated under my direct supervision. All questions answered and patient voiced understanding.   If symptoms persist go to the ED  RTC in 12 months

## 2022-03-01 ENCOUNTER — CLINICAL SUPPORT (OUTPATIENT)
Dept: REHABILITATION | Facility: HOSPITAL | Age: 67
End: 2022-03-01
Payer: MEDICARE

## 2022-03-01 DIAGNOSIS — M25.512 CHRONIC LEFT SHOULDER PAIN: Primary | ICD-10-CM

## 2022-03-01 DIAGNOSIS — G89.29 CHRONIC LEFT SHOULDER PAIN: Primary | ICD-10-CM

## 2022-03-01 PROCEDURE — 97110 THERAPEUTIC EXERCISES: CPT | Mod: CQ

## 2022-03-01 PROCEDURE — 97140 MANUAL THERAPY 1/> REGIONS: CPT | Mod: CQ

## 2022-03-01 NOTE — Clinical Note
Patient's meal tray taken from room. Patient denies any further needs at this time. Patient repositioned onto her right side.   The polysomnography electrophysiological record for this patient has been reviewed in its entirety.  Sleep architecture:  Total sleep time was 5.2 hours. Subject awake: 09:48pm to 11:33pm.Sleep efficiency was modest however significantly improved. Slow was sleep and REM sleep were improved.  Respiratory:  This was a full night CPAP titration study.  CPAP was initiated at 4.0 cm to CPAP 14 cm, with a full face Quattro Mirage medium mask.  C-flex (set to 3) and heated humidification were used throughout.    Motor movement / Parasomnia: No PLM's.   Cardiac: Normal, Average HR was 84 BPM with maximal  BPM.  Patient perception: Took minutes to fall asleep, slept well.   IMPRESSION:  1. Adequate titration: CPAP 14.0 cm tested with   REM non-supine sleep. 2. Sleep parameter's( total sleep time, Slow wave and REM sleep and sleep efficiency) 3. Obesity based on BMI.  RECOMMENDATION:  1. Treatment with CPAP 14.0 cm. Resmed mirage quarto medium mask. Attention to mask leak. 2. Weight loss (Si

## 2022-03-01 NOTE — PROGRESS NOTES
[facility]   Physical Therapy Treatment Note     Name: Crow FISCHER [facility] Number: [id number]    Therapy Diagnosis:   Encounter Diagnosis   Name Primary?    Chronic left shoulder pain Yes     Physician: Anthony Alvarado    Visit Date: 3/1/2022      Physician Orders: PT Eval and Treat   Medical Diagnosis from Referral:   M75.102,M12.812 (ICD-10-CM) - Rotator cuff tear arthropathy of left shoulder   Z96.612 (ICD-10-CM) - S/P reverse total shoulder arthroplasty, left      Evaluation Date: 2/3/2022  Authorization Period Expiration: 1/28/2023  Plan of Care Expiration: 4/3/2022  Progress Note Due: 3/3/2022  Visit # / Visits authorized: 5/20 (+1 eval)   FOTO: 1/3    PTA Visit #: 1/5     Precautions: Standard, Diabetes and Fall     Surgery: S/P Left Reverse Total Shoulder - 1/10/2022    Time In: 10:00am  Time Out: 10:41am  Total Billable Time: 40 minutes (Billing reflects 1 on 1 treatment time spent with patient)     SUBJECTIVE     Patient reports: his shoulder is really hurting today. It starting hurting very bad last night and continued into today. He ran out of pain pills on Saturday.   He was compliant with home exercise program with pain.  Response to previous treatment: felt pretty good yesterday.   Functional change: decreased sleep, unable to cook, clean home and hard to shop for groceries    Pain: 10/10     Location: anterior left shoulder joint    OBJECTIVE     Objective Measures updated at progress report unless specified.     Left Shoulder ROM: Flexion-135, Abduction-135, External Rotation- 90, Internal Rotation-45    TREATMENT     Crow received the treatments listed below:       MANUAL THERAPY TECHNIQUES were applied for (15) minutes, including:    Manual Intervention Performed Today    Soft Tissue Mobilization  left supraspinatus , infraspinatus , teres major and minor , subscapularis  and deltoid   Joint Mobilizations     Mobilization with movement x PROM  Flexion, Abduction, and Rotation    Subscapular release x Left   Functional Dry Needling        Plan for Next Visit:      All passive range was performed to his tolerance in supine.     THERAPEUTIC EXERCISES to develop strength, endurance, ROM and flexibility for (25) minutes including:    Intervention Performed Today    Pendulums   2 minutes  X 2 each   Bicep Curls x 3 minutes    Table slides    3  minutes Flexion  3 minutes Scaption   Scap Retractions x 3x10 red band   Shoulder Isometrics x [id number] all direction   Pulleys Flexion and Abduction  x 3 minutes each    Wand Flexion and Abduction  x 3 minutes each   Shoulder Flexion x 2 minutes     Plan for Next Visit:        PATIENT EDUCATION AND HOME EXERCISES     Home Exercises Provided and Patient Education Provided     Education provided: (during session) minutes   Patient educated on biomechanical justification for therapeutic exercise and importance of compliance with HEP in order to improve overall impairments and QOL    Patient was educated on all the above exercise prior/during/after for proper posture, positioning, and execution for safe performance with home exercise program.     Written Home Exercises Provided: yes.  Exercises were reviewed and Crow was able to demonstrate them prior to the end of the session.  Crow demonstrated good  understanding of the education provided. See EMR under Patient Instructions for exercises provided during therapy sessions.      ASSESSMENT     Patient limited by pain with most activities today. Verbal cues necessary throughout session for decreased upper trapezius activation. Subscapular release performed today, patient with very good response. Improved shoulder range of motion post manual therapy, patient leaving session with reports of decreased pain and feeling good.   Crow is progressing well towards his goals.   Pt prognosis is Fair.     Pt will continue to benefit from skilled outpatient physical therapy to  address the deficits listed in the problem list box on initial evaluation, provide pt/family education and to maximize pt's level of independence in the home and community environment.     Pt's spiritual, cultural and educational needs considered and pt agreeable to plan of care and goals.       Anticipated Barriers for therapy: chronic shoulder pain, prior inconsistency with therapy       Goals:  Short Term Goals: 4 weeks   1. Patient will improve shoulder flexion to 135 degrees in order to come out of sling.  2. Patient will improve shoulder abduction to 115 degrees in order to start light active range.  3. Patient will be independent with HEP in order to further progress and return to maximal function.  4. Pain rating at Worst: 5/10 in order to progress towards increased independence with activity.  5. Patient will improve postural awareness.      Long Term Goals: 8 weeks   1. Patient will improve shoulder strength to 4-/5 in order to perform ADLs with left arm.  2. Patient will improve elbow strength to 4/5 in order to hold small items.  3. Patient will improve shoulder flexion to 150 degrees in order to reach overhead.   4. Patient will improve shoulder abduciton to 145 degrees in order to reach into cabinets.   5. Patient will self report improvement to 40% limitation on the FOTO Shoulder Survey.    Goals Key:  PC= progressing/continue; PM= partially met;        DC= discontinue    PLAN     Continue Plan of Care (POC) and progress per patient tolerance. See treatment section for details on planned progressions next session.      Carol Lao, JANESSA

## 2022-03-02 ENCOUNTER — TELEPHONE (OUTPATIENT)
Dept: DIABETES | Facility: CLINIC | Age: 67
End: 2022-03-02
Payer: MEDICARE

## 2022-03-02 ENCOUNTER — HOSPITAL ENCOUNTER (OUTPATIENT)
Facility: HOSPITAL | Age: 67
Discharge: HOME | End: 2022-03-05
Attending: EMERGENCY MEDICINE | Admitting: STUDENT IN AN ORGANIZED HEALTH CARE EDUCATION/TRAINING PROGRAM
Payer: MEDICARE

## 2022-03-02 DIAGNOSIS — D62 ACUTE BLOOD LOSS ANEMIA: ICD-10-CM

## 2022-03-02 DIAGNOSIS — R55 SYNCOPE: ICD-10-CM

## 2022-03-02 DIAGNOSIS — K92.2 GI BLEED: ICD-10-CM

## 2022-03-02 DIAGNOSIS — D64.9 SYMPTOMATIC ANEMIA: Primary | ICD-10-CM

## 2022-03-02 DIAGNOSIS — M25.552 BILATERAL HIP PAIN: ICD-10-CM

## 2022-03-02 DIAGNOSIS — R19.7 DIARRHEA, UNSPECIFIED TYPE: ICD-10-CM

## 2022-03-02 DIAGNOSIS — R55 SYNCOPE AND COLLAPSE: ICD-10-CM

## 2022-03-02 DIAGNOSIS — E11.9 TYPE 2 DIABETES MELLITUS WITHOUT COMPLICATION, WITH LONG-TERM CURRENT USE OF INSULIN: ICD-10-CM

## 2022-03-02 DIAGNOSIS — M25.511 RIGHT SHOULDER PAIN: ICD-10-CM

## 2022-03-02 DIAGNOSIS — R19.5 OCCULT BLOOD POSITIVE STOOL: ICD-10-CM

## 2022-03-02 DIAGNOSIS — M25.551 BILATERAL HIP PAIN: ICD-10-CM
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
## 2022-03-02 DIAGNOSIS — Z79.4 TYPE 2 DIABETES MELLITUS WITHOUT COMPLICATION, WITH LONG-TERM CURRENT USE OF INSULIN: ICD-10-CM

## 2022-03-02 LAB
ABO + RH BLD: NORMAL
ALBUMIN SERPL BCP-MCNC: 3.1 G/DL (ref 3.5–5.2)
ALP SERPL-CCNC: 68 U/L (ref 55–135)
ALT SERPL W/O P-5'-P-CCNC: 11 U/L (ref 10–44)
ANION GAP SERPL CALC-SCNC: 12 MMOL/L (ref 8–16)
AST SERPL-CCNC: 19 U/L (ref 10–40)
BASOPHILS # BLD AUTO: 0.01 K/UL (ref 0–0.2)
BASOPHILS NFR BLD: 0.2 % (ref 0–1.9)
BILIRUB SERPL-MCNC: 0.2 MG/DL (ref 0.1–1)
BLD GP AB SCN CELLS X3 SERPL QL: NORMAL
BLD PROD TYP BPU: NORMAL
BLOOD UNIT EXPIRATION DATE: NORMAL
BLOOD UNIT TYPE CODE: 6200
BLOOD UNIT TYPE: NORMAL
BNP SERPL-MCNC: 342 PG/ML (ref 0–99)
BUN SERPL-MCNC: 23 MG/DL (ref 8–23)
C DIFF GDH STL QL: NEGATIVE
C DIFF TOX A+B STL QL IA: NEGATIVE
CALCIUM SERPL-MCNC: 8.5 MG/DL (ref 8.7–10.5)
CHLORIDE SERPL-SCNC: 101 MMOL/L (ref 95–110)
CO2 SERPL-SCNC: 27 MMOL/L (ref 23–29)
CODING SYSTEM: NORMAL
CREAT SERPL-MCNC: 1.7 MG/DL (ref 0.5–1.4)
CTP QC/QA: YES
DIFFERENTIAL METHOD: ABNORMAL
DISPENSE STATUS: NORMAL
EOSINOPHIL # BLD AUTO: 0.2 K/UL (ref 0–0.5)
EOSINOPHIL NFR BLD: 4.2 % (ref 0–8)
ERYTHROCYTE [DISTWIDTH] IN BLOOD BY AUTOMATED COUNT: 20.1 % (ref 11.5–14.5)
EST. GFR  (AFRICAN AMERICAN): 48 ML/MIN/1.73 M^2
EST. GFR  (NON AFRICAN AMERICAN): 42 ML/MIN/1.73 M^2
GLUCOSE SERPL-MCNC: 75 MG/DL (ref 70–110)
HCT VFR BLD AUTO: 24.8 % (ref 40–54)
HGB BLD-MCNC: 7.3 G/DL (ref 14–18)
IMM GRANULOCYTES # BLD AUTO: 0.01 K/UL (ref 0–0.04)
IMM GRANULOCYTES NFR BLD AUTO: 0.2 % (ref 0–0.5)
LYMPHOCYTES # BLD AUTO: 1.6 K/UL (ref 1–4.8)
LYMPHOCYTES NFR BLD: 31.5 % (ref 18–48)
MCH RBC QN AUTO: 23 PG (ref 27–31)
MCHC RBC AUTO-ENTMCNC: 29.4 G/DL (ref 32–36)
MCV RBC AUTO: 78 FL (ref 82–98)
MONOCYTES # BLD AUTO: 0.8 K/UL (ref 0.3–1)
MONOCYTES NFR BLD: 16.1 % (ref 4–15)
NEUTROPHILS # BLD AUTO: 2.4 K/UL (ref 1.8–7.7)
NEUTROPHILS NFR BLD: 47.8 % (ref 38–73)
NRBC BLD-RTO: 0 /100 WBC
NUM UNITS TRANS PACKED RBC: NORMAL
OB PNL STL: POSITIVE
PLATELET # BLD AUTO: 229 K/UL (ref 150–450)
PMV BLD AUTO: 10.2 FL (ref 9.2–12.9)
POTASSIUM SERPL-SCNC: 3.7 MMOL/L (ref 3.5–5.1)
PROT SERPL-MCNC: 6.8 G/DL (ref 6–8.4)
RBC # BLD AUTO: 3.18 M/UL (ref 4.6–6.2)
RV AG STL QL IA.RAPID: NEGATIVE
SARS-COV-2 RDRP RESP QL NAA+PROBE: NEGATIVE
SODIUM SERPL-SCNC: 140 MMOL/L (ref 136–145)
TROPONIN I SERPL DL<=0.01 NG/ML-MCNC: 0.11 NG/ML (ref 0–0.03)
TROPONIN I SERPL DL<=0.01 NG/ML-MCNC: 0.12 NG/ML (ref 0–0.03)
WBC # BLD AUTO: 4.96 K/UL (ref 3.9–12.7)

## 2022-03-02 PROCEDURE — 87209 SMEAR COMPLEX STAIN: CPT | Performed by: EMERGENCY MEDICINE

## 2022-03-02 PROCEDURE — 82656 EL-1 FECAL QUAL/SEMIQ: CPT | Performed by: EMERGENCY MEDICINE

## 2022-03-02 PROCEDURE — 82705 FATS/LIPIDS FECES QUAL: CPT | Performed by: EMERGENCY MEDICINE

## 2022-03-02 PROCEDURE — 25000003 PHARM REV CODE 250: Performed by: EMERGENCY MEDICINE

## 2022-03-02 PROCEDURE — P9016 RBC LEUKOCYTES REDUCED: HCPCS | Performed by: EMERGENCY MEDICINE

## 2022-03-02 PROCEDURE — 87425 ROTAVIRUS AG IA: CPT | Performed by: EMERGENCY MEDICINE

## 2022-03-02 PROCEDURE — 87427 SHIGA-LIKE TOXIN AG IA: CPT | Performed by: EMERGENCY MEDICINE

## 2022-03-02 PROCEDURE — 96361 HYDRATE IV INFUSION ADD-ON: CPT

## 2022-03-02 PROCEDURE — 80053 COMPREHEN METABOLIC PANEL: CPT | Performed by: EMERGENCY MEDICINE

## 2022-03-02 PROCEDURE — 83993 ASSAY FOR CALPROTECTIN FECAL: CPT | Performed by: EMERGENCY MEDICINE

## 2022-03-02 PROCEDURE — 93010 ELECTROCARDIOGRAM REPORT: CPT | Mod: ,,, | Performed by: INTERNAL MEDICINE

## 2022-03-02 PROCEDURE — 87449 NOS EACH ORGANISM AG IA: CPT | Performed by: EMERGENCY MEDICINE

## 2022-03-02 PROCEDURE — G0378 HOSPITAL OBSERVATION PER HR: HCPCS

## 2022-03-02 PROCEDURE — 87177 OVA AND PARASITES SMEARS: CPT | Performed by: EMERGENCY MEDICINE

## 2022-03-02 PROCEDURE — 87329 GIARDIA AG IA: CPT | Performed by: EMERGENCY MEDICINE

## 2022-03-02 PROCEDURE — 99291 CRITICAL CARE FIRST HOUR: CPT | Mod: 25

## 2022-03-02 PROCEDURE — 93005 ELECTROCARDIOGRAM TRACING: CPT

## 2022-03-02 PROCEDURE — 83880 ASSAY OF NATRIURETIC PEPTIDE: CPT | Performed by: EMERGENCY MEDICINE

## 2022-03-02 PROCEDURE — 36430 TRANSFUSION BLD/BLD COMPNT: CPT

## 2022-03-02 PROCEDURE — 93010 EKG 12-LEAD: ICD-10-PCS | Mod: ,,, | Performed by: INTERNAL MEDICINE

## 2022-03-02 PROCEDURE — 86900 BLOOD TYPING SEROLOGIC ABO: CPT | Performed by: EMERGENCY MEDICINE

## 2022-03-02 PROCEDURE — 82272 OCCULT BLD FECES 1-3 TESTS: CPT | Performed by: EMERGENCY MEDICINE

## 2022-03-02 PROCEDURE — 87046 STOOL CULTR AEROBIC BACT EA: CPT | Performed by: EMERGENCY MEDICINE

## 2022-03-02 PROCEDURE — 89055 LEUKOCYTE ASSESSMENT FECAL: CPT | Performed by: EMERGENCY MEDICINE

## 2022-03-02 PROCEDURE — 84484 ASSAY OF TROPONIN QUANT: CPT | Mod: 91 | Performed by: EMERGENCY MEDICINE

## 2022-03-02 PROCEDURE — 86901 BLOOD TYPING SEROLOGIC RH(D): CPT | Performed by: EMERGENCY MEDICINE

## 2022-03-02 PROCEDURE — 85025 COMPLETE CBC W/AUTO DIFF WBC: CPT | Performed by: EMERGENCY MEDICINE

## 2022-03-02 PROCEDURE — 87045 FECES CULTURE AEROBIC BACT: CPT | Performed by: EMERGENCY MEDICINE

## 2022-03-02 PROCEDURE — 86920 COMPATIBILITY TEST SPIN: CPT | Performed by: EMERGENCY MEDICINE

## 2022-03-02 PROCEDURE — U0002 COVID-19 LAB TEST NON-CDC: HCPCS | Performed by: EMERGENCY MEDICINE

## 2022-03-02 RX ORDER — TRAMADOL HYDROCHLORIDE 50 MG/1
50 TABLET ORAL EVERY 4 HOURS PRN
Qty: 30 TABLET | Refills: 0 | Status: SHIPPED | OUTPATIENT
Start: 2022-03-02 | End: 2022-03-11

## 2022-03-02 RX ORDER — HYDROCODONE BITARTRATE AND ACETAMINOPHEN 5; 325 MG/1; MG/1
1 TABLET ORAL
Status: COMPLETED | OUTPATIENT
Start: 2022-03-02 | End: 2022-03-02

## 2022-03-02 RX ORDER — LOPERAMIDE HYDROCHLORIDE 2 MG/1
2 CAPSULE ORAL
Status: COMPLETED | OUTPATIENT
Start: 2022-03-02 | End: 2022-03-02

## 2022-03-02 RX ORDER — HYDROCODONE BITARTRATE AND ACETAMINOPHEN 500; 5 MG/1; MG/1
TABLET ORAL
Status: DISCONTINUED | OUTPATIENT
Start: 2022-03-02 | End: 2022-03-05 | Stop reason: HOSPADM

## 2022-03-02 RX ADMIN — SODIUM CHLORIDE 1000 ML: 0.9 INJECTION, SOLUTION INTRAVENOUS at 08:03

## 2022-03-02 RX ADMIN — LOPERAMIDE HYDROCHLORIDE 2 MG: 2 CAPSULE ORAL at 08:03

## 2022-03-02 RX ADMIN — HYDROCODONE BITARTRATE AND ACETAMINOPHEN 1 TABLET: 5; 325 TABLET ORAL at 10:03

## 2022-03-02 NOTE — TELEPHONE ENCOUNTER
Pt states he was told to [facility] hern[facility] if he had needed help would dexcom. Pt would like to speak with mary

## 2022-03-02 NOTE — TELEPHONE ENCOUNTER
----- Message from Ashely Son RD sent at 3/2/2022  1:54 PM CST -----    ----- Message -----  From: Lauren Armendariz  Sent: 3/2/2022  11:38 AM CST  To: Ashely Son RD    .Type:  Needs Medical Advice    Who Called: SHAGGY JASON [[id number]]  Symptoms (please be specific):   How long has patient had these symptoms:   Pharmacy name and phone #:    Would the patient rather a call back or a response via [facility]?  Best Call Back Number:  [phone]  Additional Information: pt is requesting a call from office regarding Dexcom sensor. Please call

## 2022-03-03 PROBLEM — R55 SYNCOPE AND COLLAPSE: Status: ACTIVE | Noted: 2022-03-03

## 2022-03-03 PROBLEM — K92.2 GI BLEED: Status: ACTIVE | Noted: 2022-03-03

## 2022-03-03 PROBLEM — R19.7 DIARRHEA: Status: ACTIVE | Noted: 2022-03-03

## 2022-03-03 LAB
ALBUMIN SERPL BCP-MCNC: 3 G/DL (ref 3.5–5.2)
ALP SERPL-CCNC: 72 U/L (ref 55–135)
ALT SERPL W/O P-5'-P-CCNC: 14 U/L (ref 10–44)
ANION GAP SERPL CALC-SCNC: 9 MMOL/L (ref 8–16)
AORTIC ROOT ANNULUS: 3.37 CM
ASCENDING AORTA: 3.35 CM
AST SERPL-CCNC: 18 U/L (ref 10–40)
AV INDEX (PROSTH): 0.73
AV MEAN GRADIENT: 5 MMHG
AV PEAK GRADIENT: 9 MMHG
AV VALVE AREA: 2.51 CM2
AV VELOCITY RATIO: 0.66
BASOPHILS # BLD AUTO: 0.01 K/UL (ref 0–0.2)
BASOPHILS # BLD AUTO: 0.02 K/UL (ref 0–0.2)
BASOPHILS NFR BLD: 0.2 % (ref 0–1.9)
BASOPHILS NFR BLD: 0.3 % (ref 0–1.9)
BILIRUB SERPL-MCNC: 0.3 MG/DL (ref 0.1–1)
BSA FOR ECHO PROCEDURE: 2.03 M2
BUN SERPL-MCNC: 27 MG/DL (ref 8–23)
CALCIUM SERPL-MCNC: 7.8 MG/DL (ref 8.7–10.5)
CHLORIDE SERPL-SCNC: 105 MMOL/L (ref 95–110)
CO2 SERPL-SCNC: 27 MMOL/L (ref 23–29)
CREAT SERPL-MCNC: 1.5 MG/DL (ref 0.5–1.4)
CRYPTOSP AG STL QL IA: NEGATIVE
CV ECHO LV RWT: 0.71 CM
DIFFERENTIAL METHOD: ABNORMAL
DIFFERENTIAL METHOD: ABNORMAL
DOP CALC AO PEAK VEL: 1.46 M/S
DOP CALC AO VTI: 29.6 CM
DOP CALC LVOT AREA: 3.5 CM2
DOP CALC LVOT DIAMETER: 2.1 CM
DOP CALC LVOT PEAK VEL: 0.97 M/S
DOP CALC LVOT STROKE VOLUME: 74.43 CM3
DOP CALC RVOT PEAK VEL: 0.99 M/S
DOP CALC RVOT VTI: 21.2 CM
DOP CALCLVOT PEAK VEL VTI: 21.5 CM
E COLI SXT1 STL QL IA: NEGATIVE
E COLI SXT2 STL QL IA: NEGATIVE
E WAVE DECELERATION TIME: 178.77 MSEC
E/A RATIO: 1.85
E/E' RATIO: 13.24 M/S
ECHO EF ESTIMATED: 53 %
ECHO LV POSTERIOR WALL: 1.57 CM (ref 0.6–1.1)
EJECTION FRACTION: 45 %
EOSINOPHIL # BLD AUTO: 0.2 K/UL (ref 0–0.5)
EOSINOPHIL # BLD AUTO: 0.3 K/UL (ref 0–0.5)
EOSINOPHIL NFR BLD: 4.3 % (ref 0–8)
EOSINOPHIL NFR BLD: 4.7 % (ref 0–8)
ERYTHROCYTE [DISTWIDTH] IN BLOOD BY AUTOMATED COUNT: 19.7 % (ref 11.5–14.5)
ERYTHROCYTE [DISTWIDTH] IN BLOOD BY AUTOMATED COUNT: 19.7 % (ref 11.5–14.5)
EST. GFR  (AFRICAN AMERICAN): 56 ML/MIN/1.73 M^2
EST. GFR  (NON AFRICAN AMERICAN): 49 ML/MIN/1.73 M^2
ESTIMATED AVG GLUCOSE: 128 MG/DL (ref 68–131)
FERRITIN SERPL-MCNC: 24 NG/ML (ref 20–300)
FRACTIONAL SHORTENING: 27 % (ref 28–44)
G LAMBLIA AG STL QL IA: NEGATIVE
GLUCOSE SERPL-MCNC: 92 MG/DL (ref 70–110)
HBA1C MFR BLD: 6.1 % (ref 4–5.6)
HCT VFR BLD AUTO: 28 % (ref 40–54)
HCT VFR BLD AUTO: 30.3 % (ref 40–54)
HCT VFR BLD AUTO: 30.5 % (ref 40–54)
HGB BLD-MCNC: 8.3 G/DL (ref 14–18)
HGB BLD-MCNC: 9.1 G/DL (ref 14–18)
HGB BLD-MCNC: 9.1 G/DL (ref 14–18)
IMM GRANULOCYTES # BLD AUTO: 0.01 K/UL (ref 0–0.04)
IMM GRANULOCYTES # BLD AUTO: 0.01 K/UL (ref 0–0.04)
IMM GRANULOCYTES NFR BLD AUTO: 0.2 % (ref 0–0.5)
IMM GRANULOCYTES NFR BLD AUTO: 0.2 % (ref 0–0.5)
INTERVENTRICULAR SEPTUM: 1.87 CM (ref 0.6–1.1)
IRON SERPL-MCNC: 44 UG/DL (ref 45–160)
IVC DIAMETER: 2 CM
IVRT: 73.82 MSEC
LA MAJOR: 4.71 CM
LA MINOR: 5.32 CM
LA WIDTH: 3.5 CM
LEFT ATRIUM SIZE: 3.86 CM
LEFT ATRIUM VOLUME INDEX: 29.1 ML/M2
LEFT ATRIUM VOLUME: 57.38 CM3
LEFT INTERNAL DIMENSION IN SYSTOLE: 3.25 CM (ref 2.1–4)
LEFT VENTRICLE DIASTOLIC VOLUME INDEX: 45.83 ML/M2
LEFT VENTRICLE DIASTOLIC VOLUME: 90.28 ML
LEFT VENTRICLE MASS INDEX: 171 G/M2
LEFT VENTRICLE SYSTOLIC VOLUME INDEX: 21.6 ML/M2
LEFT VENTRICLE SYSTOLIC VOLUME: 42.62 ML
LEFT VENTRICULAR INTERNAL DIMENSION IN DIASTOLE: 4.45 CM (ref 3.5–6)
LEFT VENTRICULAR MASS: 335.94 G
LV LATERAL E/E' RATIO: 9.93 M/S
LV SEPTAL E/E' RATIO: 19.86 M/S
LVOT MG: 2.44 MMHG
LVOT MV: 0.75 CM/S
LYMPHOCYTES # BLD AUTO: 1.4 K/UL (ref 1–4.8)
LYMPHOCYTES # BLD AUTO: 2 K/UL (ref 1–4.8)
LYMPHOCYTES NFR BLD: 27.9 % (ref 18–48)
LYMPHOCYTES NFR BLD: 34 % (ref 18–48)
MAGNESIUM SERPL-MCNC: 2 MG/DL (ref 1.6–2.6)
MCH RBC QN AUTO: 23.2 PG (ref 27–31)
MCH RBC QN AUTO: 23.8 PG (ref 27–31)
MCHC RBC AUTO-ENTMCNC: 29.6 G/DL (ref 32–36)
MCHC RBC AUTO-ENTMCNC: 29.8 G/DL (ref 32–36)
MCV RBC AUTO: 78 FL (ref 82–98)
MCV RBC AUTO: 80 FL (ref 82–98)
MONOCYTES # BLD AUTO: 0.6 K/UL (ref 0.3–1)
MONOCYTES # BLD AUTO: 0.7 K/UL (ref 0.3–1)
MONOCYTES NFR BLD: 11.4 % (ref 4–15)
MONOCYTES NFR BLD: 12.2 % (ref 4–15)
MV PEAK A VEL: 0.75 M/S
MV PEAK E VEL: 1.39 M/S
MV STENOSIS PRESSURE HALF TIME: 51.84 MS
MV VALVE AREA P 1/2 METHOD: 4.24 CM2
NEUTROPHILS # BLD AUTO: 2.8 K/UL (ref 1.8–7.7)
NEUTROPHILS # BLD AUTO: 2.9 K/UL (ref 1.8–7.7)
NEUTROPHILS NFR BLD: 49 % (ref 38–73)
NEUTROPHILS NFR BLD: 55.6 % (ref 38–73)
NRBC BLD-RTO: 0 /100 WBC
NRBC BLD-RTO: 0 /100 WBC
PISA MRMAX VEL: 4.33 M/S
PLATELET # BLD AUTO: 203 K/UL (ref 150–450)
PLATELET # BLD AUTO: 208 K/UL (ref 150–450)
PMV BLD AUTO: 10.5 FL (ref 9.2–12.9)
PMV BLD AUTO: ABNORMAL FL (ref 9.2–12.9)
POCT GLUCOSE: 112 MG/DL (ref 70–110)
POCT GLUCOSE: 121 MG/DL (ref 70–110)
POCT GLUCOSE: 129 MG/DL (ref 70–110)
POCT GLUCOSE: 88 MG/DL (ref 70–110)
POTASSIUM SERPL-SCNC: 4.1 MMOL/L (ref 3.5–5.1)
PROT SERPL-MCNC: 6.8 G/DL (ref 6–8.4)
PV MEAN GRADIENT: 2.5 MMHG
RA MAJOR: 4.09 CM
RA PRESSURE: 3 MMHG
RBC # BLD AUTO: 3.57 M/UL (ref 4.6–6.2)
RBC # BLD AUTO: 3.83 M/UL (ref 4.6–6.2)
SATURATED IRON: 12 % (ref 20–50)
SODIUM SERPL-SCNC: 141 MMOL/L (ref 136–145)
TDI LATERAL: 0.14 M/S
TDI SEPTAL: 0.07 M/S
TDI: 0.11 M/S
TOTAL IRON BINDING CAPACITY: 376 UG/DL (ref 250–450)
TRANSFERRIN SERPL-MCNC: 254 MG/DL (ref 200–375)
TRICUSPID ANNULAR PLANE SYSTOLIC EXCURSION: 1.7 CM
TROPONIN I SERPL DL<=0.01 NG/ML-MCNC: 0.1 NG/ML (ref 0–0.03)
VIT B12 SERPL-MCNC: 239 PG/ML (ref 210–950)
WBC # BLD AUTO: 5.16 K/UL (ref 3.9–12.7)
WBC # BLD AUTO: 5.76 K/UL (ref 3.9–12.7)
WBC #/AREA STL HPF: NORMAL /[HPF]

## 2022-03-03 PROCEDURE — 63600175 PHARM REV CODE 636 W HCPCS: Performed by: NURSE PRACTITIONER

## 2022-03-03 PROCEDURE — 27000221 HC OXYGEN, UP TO 24 HOURS

## 2022-03-03 PROCEDURE — 25000003 PHARM REV CODE 250: Performed by: INTERNAL MEDICINE

## 2022-03-03 PROCEDURE — 85025 COMPLETE CBC W/AUTO DIFF WBC: CPT | Performed by: NURSE PRACTITIONER

## 2022-03-03 PROCEDURE — 36430 TRANSFUSION BLD/BLD COMPNT: CPT

## 2022-03-03 PROCEDURE — 99900035 HC TECH TIME PER 15 MIN (STAT)

## 2022-03-03 PROCEDURE — 94640 AIRWAY INHALATION TREATMENT: CPT

## 2022-03-03 PROCEDURE — 83735 ASSAY OF MAGNESIUM: CPT | Performed by: NURSE PRACTITIONER

## 2022-03-03 PROCEDURE — 82607 VITAMIN B-12: CPT | Performed by: NURSE PRACTITIONER

## 2022-03-03 PROCEDURE — 63600175 PHARM REV CODE 636 W HCPCS: Performed by: INTERNAL MEDICINE

## 2022-03-03 PROCEDURE — 82728 ASSAY OF FERRITIN: CPT | Performed by: NURSE PRACTITIONER

## 2022-03-03 PROCEDURE — 85018 HEMOGLOBIN: CPT | Performed by: INTERNAL MEDICINE

## 2022-03-03 PROCEDURE — 36415 COLL VENOUS BLD VENIPUNCTURE: CPT | Performed by: NURSE PRACTITIONER

## 2022-03-03 PROCEDURE — 94761 N-INVAS EAR/PLS OXIMETRY MLT: CPT

## 2022-03-03 PROCEDURE — 63600175 PHARM REV CODE 636 W HCPCS: Performed by: PHYSICIAN ASSISTANT

## 2022-03-03 PROCEDURE — 99204 PR OFFICE/OUTPT VISIT, NEW, LEVL IV, 45-59 MIN: ICD-10-PCS | Mod: ,,, | Performed by: INTERNAL MEDICINE

## 2022-03-03 PROCEDURE — 25000242 PHARM REV CODE 250 ALT 637 W/ HCPCS: Performed by: NURSE PRACTITIONER

## 2022-03-03 PROCEDURE — C9113 INJ PANTOPRAZOLE SODIUM, VIA: HCPCS | Performed by: NURSE PRACTITIONER

## 2022-03-03 PROCEDURE — 25000003 PHARM REV CODE 250: Performed by: PHYSICIAN ASSISTANT

## 2022-03-03 PROCEDURE — 99204 OFFICE O/P NEW MOD 45 MIN: CPT | Mod: ,,, | Performed by: INTERNAL MEDICINE

## 2022-03-03 PROCEDURE — 96375 TX/PRO/DX INJ NEW DRUG ADDON: CPT | Mod: 59

## 2022-03-03 PROCEDURE — 85014 HEMATOCRIT: CPT | Performed by: INTERNAL MEDICINE

## 2022-03-03 PROCEDURE — G0378 HOSPITAL OBSERVATION PER HR: HCPCS

## 2022-03-03 PROCEDURE — 84484 ASSAY OF TROPONIN QUANT: CPT | Performed by: NURSE PRACTITIONER

## 2022-03-03 PROCEDURE — 84466 ASSAY OF TRANSFERRIN: CPT | Performed by: NURSE PRACTITIONER

## 2022-03-03 PROCEDURE — 80053 COMPREHEN METABOLIC PANEL: CPT | Performed by: NURSE PRACTITIONER

## 2022-03-03 PROCEDURE — 96372 THER/PROPH/DIAG INJ SC/IM: CPT | Performed by: INTERNAL MEDICINE

## 2022-03-03 PROCEDURE — 25000003 PHARM REV CODE 250: Performed by: NURSE PRACTITIONER

## 2022-03-03 PROCEDURE — 96376 TX/PRO/DX INJ SAME DRUG ADON: CPT

## 2022-03-03 PROCEDURE — 83036 HEMOGLOBIN GLYCOSYLATED A1C: CPT | Performed by: NURSE PRACTITIONER

## 2022-03-03 PROCEDURE — 25000003 PHARM REV CODE 250: Performed by: STUDENT IN AN ORGANIZED HEALTH CARE EDUCATION/TRAINING PROGRAM

## 2022-03-03 PROCEDURE — 96374 THER/PROPH/DIAG INJ IV PUSH: CPT | Mod: 59

## 2022-03-03 RX ORDER — TRAZODONE HYDROCHLORIDE 100 MG/1
200 TABLET ORAL NIGHTLY
Status: DISCONTINUED | OUTPATIENT
Start: 2022-03-03 | End: 2022-03-03

## 2022-03-03 RX ORDER — BISACODYL 5 MG
20 TABLET, DELAYED RELEASE (ENTERIC COATED) ORAL ONCE
Status: COMPLETED | OUTPATIENT
Start: 2022-03-03 | End: 2022-03-03

## 2022-03-03 RX ORDER — POLYETHYLENE GLYCOL 3350, SODIUM SULFATE ANHYDROUS, SODIUM BICARBONATE, SODIUM CHLORIDE, POTASSIUM CHLORIDE 236; 22.74; 6.74; 5.86; 2.97 G/4L; G/4L; G/4L; G/4L; G/4L
4000 POWDER, FOR SOLUTION ORAL ONCE
Status: COMPLETED | OUTPATIENT
Start: 2022-03-03 | End: 2022-03-03

## 2022-03-03 RX ORDER — ISOSORBIDE MONONITRATE 60 MG/1
60 TABLET, EXTENDED RELEASE ORAL DAILY
Status: DISCONTINUED | OUTPATIENT
Start: 2022-03-03 | End: 2022-03-03

## 2022-03-03 RX ORDER — PANTOPRAZOLE SODIUM 40 MG/10ML
80 INJECTION, POWDER, LYOPHILIZED, FOR SOLUTION INTRAVENOUS ONCE
Status: COMPLETED | OUTPATIENT
Start: 2022-03-03 | End: 2022-03-03

## 2022-03-03 RX ORDER — ACETAMINOPHEN 325 MG/1
650 TABLET ORAL EVERY 6 HOURS PRN
Status: DISCONTINUED | OUTPATIENT
Start: 2022-03-03 | End: 2022-03-03

## 2022-03-03 RX ORDER — ATORVASTATIN CALCIUM 40 MG/1
40 TABLET, FILM COATED ORAL NIGHTLY
Status: DISCONTINUED | OUTPATIENT
Start: 2022-03-03 | End: 2022-03-03

## 2022-03-03 RX ORDER — GLUCAGON 1 MG
1 KIT INJECTION
Status: DISCONTINUED | OUTPATIENT
Start: 2022-03-03 | End: 2022-03-05 | Stop reason: HOSPADM

## 2022-03-03 RX ORDER — HYDROCODONE BITARTRATE AND ACETAMINOPHEN 7.5; 325 MG/1; MG/1
1 TABLET ORAL EVERY 6 HOURS PRN
Status: DISCONTINUED | OUTPATIENT
Start: 2022-03-03 | End: 2022-03-05 | Stop reason: HOSPADM

## 2022-03-03 RX ORDER — IPRATROPIUM BROMIDE AND ALBUTEROL SULFATE 2.5; .5 MG/3ML; MG/3ML
3 SOLUTION RESPIRATORY (INHALATION)
Status: DISCONTINUED | OUTPATIENT
Start: 2022-03-03 | End: 2022-03-05 | Stop reason: HOSPADM

## 2022-03-03 RX ORDER — METOPROLOL SUCCINATE 50 MG/1
50 TABLET, EXTENDED RELEASE ORAL NIGHTLY
Status: DISCONTINUED | OUTPATIENT
Start: 2022-03-03 | End: 2022-03-03

## 2022-03-03 RX ORDER — LATANOPROST 50 UG/ML
1 SOLUTION/ DROPS OPHTHALMIC DAILY
Status: DISCONTINUED | OUTPATIENT
Start: 2022-03-03 | End: 2022-03-05 | Stop reason: HOSPADM

## 2022-03-03 RX ORDER — TRAZODONE HYDROCHLORIDE 100 MG/1
200 TABLET ORAL NIGHTLY
Status: DISCONTINUED | OUTPATIENT
Start: 2022-03-03 | End: 2022-03-05 | Stop reason: HOSPADM

## 2022-03-03 RX ORDER — CYANOCOBALAMIN 1000 UG/ML
1000 INJECTION, SOLUTION INTRAMUSCULAR; SUBCUTANEOUS ONCE
Status: COMPLETED | OUTPATIENT
Start: 2022-03-03 | End: 2022-03-03

## 2022-03-03 RX ORDER — BUDESONIDE 0.5 MG/2ML
0.5 INHALANT ORAL EVERY 12 HOURS
Status: DISCONTINUED | OUTPATIENT
Start: 2022-03-03 | End: 2022-03-05 | Stop reason: HOSPADM

## 2022-03-03 RX ORDER — AMLODIPINE BESYLATE 2.5 MG/1
2.5 TABLET ORAL 2 TIMES DAILY
Status: DISCONTINUED | OUTPATIENT
Start: 2022-03-03 | End: 2022-03-05 | Stop reason: HOSPADM

## 2022-03-03 RX ORDER — SERTRALINE HYDROCHLORIDE 50 MG/1
100 TABLET, FILM COATED ORAL NIGHTLY
Status: DISCONTINUED | OUTPATIENT
Start: 2022-03-03 | End: 2022-03-05 | Stop reason: HOSPADM

## 2022-03-03 RX ORDER — ISOSORBIDE MONONITRATE 30 MG/1
30 TABLET, EXTENDED RELEASE ORAL DAILY
Status: DISCONTINUED | OUTPATIENT
Start: 2022-03-04 | End: 2022-03-03

## 2022-03-03 RX ORDER — AMLODIPINE BESYLATE 2.5 MG/1
2.5 TABLET ORAL NIGHTLY
Status: DISCONTINUED | OUTPATIENT
Start: 2022-03-03 | End: 2022-03-03

## 2022-03-03 RX ORDER — RANOLAZINE 500 MG/1
500 TABLET, EXTENDED RELEASE ORAL 2 TIMES DAILY
Status: DISCONTINUED | OUTPATIENT
Start: 2022-03-03 | End: 2022-03-05 | Stop reason: HOSPADM

## 2022-03-03 RX ORDER — PANTOPRAZOLE SODIUM 40 MG/10ML
40 INJECTION, POWDER, LYOPHILIZED, FOR SOLUTION INTRAVENOUS 2 TIMES DAILY
Status: DISCONTINUED | OUTPATIENT
Start: 2022-03-03 | End: 2022-03-05 | Stop reason: HOSPADM

## 2022-03-03 RX ORDER — IPRATROPIUM BROMIDE AND ALBUTEROL SULFATE 2.5; .5 MG/3ML; MG/3ML
3 SOLUTION RESPIRATORY (INHALATION) EVERY 6 HOURS
Status: DISCONTINUED | OUTPATIENT
Start: 2022-03-03 | End: 2022-03-05 | Stop reason: HOSPADM

## 2022-03-03 RX ORDER — INSULIN ASPART 100 [IU]/ML
0-5 INJECTION, SOLUTION INTRAVENOUS; SUBCUTANEOUS EVERY 6 HOURS PRN
Status: DISCONTINUED | OUTPATIENT
Start: 2022-03-03 | End: 2022-03-05 | Stop reason: HOSPADM

## 2022-03-03 RX ORDER — ONDANSETRON 2 MG/ML
4 INJECTION INTRAMUSCULAR; INTRAVENOUS EVERY 6 HOURS PRN
Status: DISCONTINUED | OUTPATIENT
Start: 2022-03-03 | End: 2022-03-05 | Stop reason: HOSPADM

## 2022-03-03 RX ORDER — METOPROLOL SUCCINATE 50 MG/1
50 TABLET, EXTENDED RELEASE ORAL NIGHTLY
Status: DISCONTINUED | OUTPATIENT
Start: 2022-03-03 | End: 2022-03-05 | Stop reason: HOSPADM

## 2022-03-03 RX ORDER — HYDROCODONE BITARTRATE AND ACETAMINOPHEN 10; 325 MG/1; MG/1
1 TABLET ORAL EVERY 6 HOURS PRN
Status: DISCONTINUED | OUTPATIENT
Start: 2022-03-03 | End: 2022-03-05 | Stop reason: HOSPADM

## 2022-03-03 RX ORDER — ATORVASTATIN CALCIUM 40 MG/1
40 TABLET, FILM COATED ORAL NIGHTLY
Status: DISCONTINUED | OUTPATIENT
Start: 2022-03-03 | End: 2022-03-05 | Stop reason: HOSPADM

## 2022-03-03 RX ORDER — HYDRALAZINE HYDROCHLORIDE 20 MG/ML
10 INJECTION INTRAMUSCULAR; INTRAVENOUS EVERY 6 HOURS PRN
Status: DISCONTINUED | OUTPATIENT
Start: 2022-03-03 | End: 2022-03-05 | Stop reason: HOSPADM

## 2022-03-03 RX ORDER — ACETAMINOPHEN 325 MG/1
650 TABLET ORAL EVERY 6 HOURS PRN
Status: DISCONTINUED | OUTPATIENT
Start: 2022-03-03 | End: 2022-03-05 | Stop reason: HOSPADM

## 2022-03-03 RX ORDER — TAMSULOSIN HYDROCHLORIDE 0.4 MG/1
0.4 CAPSULE ORAL DAILY
Status: DISCONTINUED | OUTPATIENT
Start: 2022-03-03 | End: 2022-03-05 | Stop reason: HOSPADM

## 2022-03-03 RX ORDER — SERTRALINE HYDROCHLORIDE 50 MG/1
100 TABLET, FILM COATED ORAL NIGHTLY
Status: DISCONTINUED | OUTPATIENT
Start: 2022-03-03 | End: 2022-03-03

## 2022-03-03 RX ADMIN — LATANOPROST 1 DROP: 50 SOLUTION/ DROPS OPHTHALMIC at 09:03

## 2022-03-03 RX ADMIN — PANTOPRAZOLE SODIUM 80 MG: 40 INJECTION, POWDER, FOR SOLUTION INTRAVENOUS at 03:03

## 2022-03-03 RX ADMIN — BUDESONIDE 0.5 MG: 0.5 INHALANT ORAL at 07:03

## 2022-03-03 RX ADMIN — PANTOPRAZOLE SODIUM 40 MG: 40 INJECTION, POWDER, FOR SOLUTION INTRAVENOUS at 09:03

## 2022-03-03 RX ADMIN — BISACODYL 20 MG: 5 TABLET, COATED ORAL at 12:03

## 2022-03-03 RX ADMIN — ATORVASTATIN CALCIUM 40 MG: 40 TABLET, FILM COATED ORAL at 09:03

## 2022-03-03 RX ADMIN — CYANOCOBALAMIN 1000 MCG: 1000 INJECTION, SOLUTION INTRAMUSCULAR at 06:03

## 2022-03-03 RX ADMIN — IPRATROPIUM BROMIDE AND ALBUTEROL SULFATE 3 ML: 2.5; .5 SOLUTION RESPIRATORY (INHALATION) at 12:03

## 2022-03-03 RX ADMIN — AMLODIPINE BESYLATE 2.5 MG: 2.5 TABLET ORAL at 09:03

## 2022-03-03 RX ADMIN — IRON SUCROSE 200 MG: 20 INJECTION, SOLUTION INTRAVENOUS at 09:03

## 2022-03-03 RX ADMIN — Medication 1 TABLET: at 09:03

## 2022-03-03 RX ADMIN — SERTRALINE HYDROCHLORIDE 100 MG: 50 TABLET ORAL at 09:03

## 2022-03-03 RX ADMIN — RANOLAZINE 500 MG: 500 TABLET, EXTENDED RELEASE ORAL at 09:03

## 2022-03-03 RX ADMIN — TAMSULOSIN HYDROCHLORIDE 0.4 MG: 0.4 CAPSULE ORAL at 09:03

## 2022-03-03 RX ADMIN — HYDROCODONE BITARTRATE AND ACETAMINOPHEN 1 TABLET: 7.5; 325 TABLET ORAL at 05:03

## 2022-03-03 RX ADMIN — POLYETHYLENE GLYCOL 3350, SODIUM SULFATE ANHYDROUS, SODIUM BICARBONATE, SODIUM CHLORIDE, POTASSIUM CHLORIDE 4000 ML: 236; 22.74; 6.74; 5.86; 2.97 POWDER, FOR SOLUTION ORAL at 02:03

## 2022-03-03 RX ADMIN — IPRATROPIUM BROMIDE AND ALBUTEROL SULFATE 3 ML: 2.5; .5 SOLUTION RESPIRATORY (INHALATION) at 07:03

## 2022-03-03 RX ADMIN — HYDROCODONE BITARTRATE AND ACETAMINOPHEN 1 TABLET: 7.5; 325 TABLET ORAL at 10:03

## 2022-03-03 RX ADMIN — TRAZODONE HYDROCHLORIDE 200 MG: 100 TABLET ORAL at 09:03

## 2022-03-03 RX ADMIN — METOPROLOL SUCCINATE 50 MG: 50 TABLET, EXTENDED RELEASE ORAL at 09:03

## 2022-03-03 RX ADMIN — PANTOPRAZOLE SODIUM 40 MG: 40 INJECTION, POWDER, FOR SOLUTION INTRAVENOUS at 12:03

## 2022-03-03 NOTE — ASSESSMENT & PLAN NOTE
Echo - 05/25/2021 - EF 40%, grade 1 diastolic dysfunction  Appears clinically compensated  Continue home medications  Cardiac diet  Strict I&Os  Daily weights  ECHO pending

## 2022-03-03 NOTE — HPI
The patient presented to the ER after a fall. He reported his legs gave out. ER work up revealed a Hgb of 7.3, MCV 78, BUN 23, creatinine 1.7, , Troponin 0.113. He was transfused one unit of blood. Repeat Hgb 8.3. His baseline is around 11. Iron studies and ferritin drawn after transfusion. Ferritin low normal. Vitals stable. The patient reports having diarrhea for the last five days. He says it occurs every half hour, and he has had accidents while sleeping. He hasn't tried to take anything for it. He denies hematochezia but reports black stool. Stool studies ordered in the ER. C. Diff negative, other results pending. He denies abdominal pain, heartburn, change in appetite or nausea. He thinks he vomited clear liquid one day. He denies sick contacts, new meds, recent travel or antibiotic use. He denies NSAID use. He has never had an EGD or colonoscopy. He takes ASA and Eliquis at home.

## 2022-03-03 NOTE — ASSESSMENT & PLAN NOTE
No evidence of acute exacerbation  Supplemental O2 prn  Duonebs scheduled and prn  Pulmicort nebs BID

## 2022-03-03 NOTE — SUBJECTIVE & OBJECTIVE
Past Medical History:   Diagnosis Date    Arthritis     Asthma     Atrial fibrillation     Atrial fibrillation with RVR 8/7/2019    CHF (congestive heart failure)     Chronic obstructive pulmonary disease 8/5/2020    Depression     Dermatomyositis     Diabetes mellitus, type 2 Diagnosed in 2000    Elevated PSA     Follow up 7/30/2020    Hypertension     Myocardial infarction     2017    Sleep apnea        Past Surgical History:   Procedure Laterality Date    ABLATION OF ARRHYTHMOGENIC FOCUS FOR ATRIAL FIBRILLATION N/A 2/27/2020    Procedure: Ablation atrial fibrillation;  Surgeon: Gio Brown MD;  Location: [facility] EP LAB;  Service: Cardiology;  Laterality: N/A;  afib, DAMIEN (cx if SR), PVI, PITO, anes, MB, 3 Prep    CHOLECYSTECTOMY      CLOSURE OF WOUND Left 7/1/2020    Procedure: CLOSURE, WOUND;  Surgeon: Barb Veras DPM;  Location: [facility] OR;  Service: Podiatry;  Laterality: Left;    INJECTION OF ANESTHETIC AGENT INTO SACROILIAC JOINT Left 3/24/2021    Procedure: Left BLOCK, SACROILIAC JOINT and bilateral rhomboid TPI;  Surgeon: Dillan Tsai MD;  Location: [facility] PAIN MGT;  Service: Pain Management;  Laterality: Left;    REVERSE TOTAL SHOULDER ARTHROPLASTY Left 1/10/2022    Procedure: ARTHROPLASTY, SHOULDER, TOTAL, REVERSE;  Surgeon: Anthony Alvarado MD;  Location: [facility] OR;  Service: Orthopedics;  Laterality: Left;  left reverse total shoulder arthroplasty     SELECTIVE INJECTION OF ANESTHETIC AGENT AROUND LUMBAR SPINAL NERVE ROOT BY TRANSFORAMINAL APPROACH Left 6/11/2020    Procedure: BLOCK, SPINAL NERVE ROOT, LUMBAR, SELECTIVE, TRANSFORAMINAL APPROACH Left L4-5, L5-S1 TFESI with RN IV sedation;  Surgeon: Dillan Tsai MD;  Location: [facility] PAIN MGT;  Service: Pain Management;  Laterality: Left;    TOE AMPUTATION Left 6/29/2020    Procedure: AMPUTATION, TOE;  Surgeon: Barb Veras DPM;  Location: [facility] OR;  Service: Podiatry;  Laterality: Left;  great toe       Review of patient's allergies indicates:    Allergen Reactions    Protamine Hives     Urticaria, possible upper airway swelling       No current facility-administered medications on file prior to encounter.     Current Outpatient Medications on File Prior to Encounter   Medication Sig    aspirin (ECOTRIN) 81 MG EC tablet Take 1 tablet (81 mg total) by mouth once daily.    acetaminophen (TYLENOL) 500 MG tablet Take 2 tablets (1,000 mg total) by mouth every 8 (eight) hours as needed for Pain.    albuterol (PROVENTIL) 2.5 mg /3 mL (0.083 %) nebulizer solution Take 3 mLs (2.5 mg total) by nebulization every 4 to 6 hours as needed for Wheezing or Shortness of Breath.    allopurinoL (ZYLOPRIM) 100 MG tablet Take 1 tablet (100 mg total) by mouth once daily.    amLODIPine (NORVASC) 2.5 MG tablet Take 1 tablet (2.5 mg total) by mouth every evening.    atorvastatin (LIPITOR) 40 MG tablet Take 1 tablet (40 mg total) by mouth every evening.    azaTHIOprine (IMURAN) 50 mg Tab Take 1 tablet (50 mg total) by mouth once daily.    baclofen (LIORESAL) 10 MG tablet TAKE 1 TABLET EVERY DAY    blood sugar diagnostic Strp 1 each by Misc.(Non-Drug; Combo Route) route 4 (four) times daily.    blood-glucose meter (TRUE METRIX GLUCOSE METER) kit Use as instructed to test blood glucose meter daily.    colchicine (COLCRYS) 0.6 mg tablet TAKE 1 TABLET (0.6 MG TOTAL) BY MOUTH ONCE DAILY.    DEPO-MEDROL 40 mg/mL injection     diclofenac sodium (VOLTAREN) 1 % Gel APPLY 2 GRAMS TOPICALLY FOUR TIMES DAILY    ELIQUIS 5 mg Tab TAKE 1 TABLET TWICE DAILY    empagliflozin (JARDIANCE) 25 mg tablet Take 1 tablet (25 mg total) by mouth once daily.    famotidine (PEPCID) 20 MG tablet Take 1 tablet (20 mg total) by mouth 2 (two) times daily.    fentaNYL (SUBLIMAZE) 50 mcg/mL injection     fluticasone-salmeterol diskus inhaler 250-50 mcg Inhale 1 puff into the lungs 2 (two) times daily. Controller    food supplemt, lactose-reduced (ENSURE) Liqd Take 118 mLs by mouth 3 (three) times daily with meals.     furosemide (LASIX) 40 MG tablet Take 1 tablet (40 mg total) by mouth once daily. TAKE 2 TABLETS EVERY MORNING  AND TAKE 1 TABLET EVERY EVENING (Patient taking differently: TAKE 2 TABLETS EVERY MORNING  AND TAKE 1 TABLET EVERY EVENING)    gabapentin (NEURONTIN) 800 MG tablet Take 1 tablet (800 mg total) by mouth 3 (three) times daily.    heparin sodium,porcine/D5W (HEPARIN, PORCINE, IN 5 % DEX) 25,000 unit/250 mL(100 unit/mL) infusion     HYDROcodone-acetaminophen (NORCO) 5-325 mg per tablet Take 1 tablet by mouth every 6 (six) hours as needed for Pain.    HYDROcodone-acetaminophen (NORCO) 5-325 mg per tablet Take 1 tablet by mouth every 6 (six) hours as needed for Pain.    insulin aspart U-100 (NOVOLOG FLEXPEN U-100 INSULIN) 100 unit/mL (3 mL) InPn pen INJECT 10 UNITS SUBCUTANEOUSLY THREE TIMES DAILY WITH MEALS    insulin degludec (TRESIBA FLEXTOUCH U-200) 200 unit/mL (3 mL) insulin pen Inject 40 Units into the skin once daily. (Patient taking differently: Inject 38 Units into the skin once daily.)    ipratropium (ATROVENT) 0.02 % nebulizer solution INHALE THE CONTENTS OF 1 VIAL VIA NEBULIZER FOUR TIMES DAILY    isosorbide mononitrate (IMDUR) 60 MG 24 hr tablet Take 1 tablet (60 mg total) by mouth once daily.    lancets Misc 1 each by Misc.(Non-Drug; Combo Route) route 4 (four) times daily.    latanoprost 0.005 % ophthalmic solution INSTILL 1 DROP INTO BOTH EYES ONE TIME DAILY    lisinopriL (PRINIVIL,ZESTRIL) 30 MG tablet Take 1 tablet by mouth once daily.    magnesium oxide (MAG-OX) 400 mg (241.3 mg magnesium) tablet Take 1 tablet (400 mg total) by mouth once daily.    metoprolol succinate (TOPROL-XL) 50 MG 24 hr tablet Take 1 tablet (50 mg total) by mouth every evening.    midazolam (VERSED) 1 mg/mL injection     naproxen (EC NAPROSYN) 500 MG EC tablet TAKE 1 TABLET(500 MG) BY MOUTH TWICE DAILY WITH MEALS    nitroGLYCERIN (NITROSTAT) 0.4 MG SL tablet Place 1 tablet (0.4 mg total) under the tongue every 5 (five)  minutes as needed for Chest pain.    OMNIPAQUE 240 240 mg iodine/mL injection     OZEMPIC 1 mg/dose (2 mg/1.5 mL) PnIj Inject 0.75 mLs (1 mg total) into the skin once a week.    OZEMPIC 1 mg/dose (4 mg/3 mL) INJECT 1MG SUBCUTANEOUSLY ONE TIME WEEKLY    pantoprazole (PROTONIX) 40 MG tablet Take 1 tablet (40 mg total) by mouth once daily.    ranolazine (RANEXA) 500 MG Tb12 Take 1 tablet (500 mg total) by mouth 2 (two) times daily.    sertraline (ZOLOFT) 100 MG tablet Take 1 tablet (100 mg total) by mouth every evening.    tacrolimus (PROTOPIC) 0.1 % ointment Apply topically 2 (two) times daily.    tamsulosin (FLOMAX) 0.4 mg Cap Take 1 capsule (0.4 mg total) by mouth once daily.    tiotropium (SPIRIVA WITH HANDIHALER) 18 mcg inhalation capsule INHALE THE CONTENTS OF 1 CAPSULE ONE TIME DAILY (CONTROLLER)    traMADoL (ULTRAM) 50 mg tablet Take 1 tablet (50 mg total) by mouth every 4 (four) hours as needed for Pain.    traZODone (DESYREL) 100 MG tablet Take 2 tablets (200 mg total) by mouth every evening.    triamcinolone acetonide 0.1% (KENALOG) 0.1 % ointment Apply topically 2 (two) times daily. Use on legs and back    VIOS AEROSOL DELIVERY SYSTEM Shefali USE AS DIRESTED     Family History       Problem Relation (Age of Onset)    Cancer Maternal Grandfather    Cataracts Mother, Father, Sister    Diabetes Mother, Sister, Maternal Aunt    Glaucoma Mother, Sister, Maternal Aunt    No Known Problems Brother, Daughter, Son    Stroke Father, Sister          Tobacco Use    Smoking status: Never Smoker    Smokeless tobacco: Never Used   Substance and Sexual Activity    Alcohol use: Yes     Comment: rare, maybe holidays    Drug use: Not Currently     Types: Cocaine    Sexual activity: Not Currently     Partners: Female     Review of Systems   Constitutional:  Negative for activity change, chills, diaphoresis, fatigue and fever.   HENT:  Negative for congestion, trouble swallowing and voice change.    Eyes:  Negative for photophobia  "and discharge.   Respiratory:  Negative for cough, chest tightness, shortness of breath and wheezing.    Cardiovascular:  Negative for chest pain, palpitations and leg swelling.   Gastrointestinal:  Positive for diarrhea ("black"). Negative for abdominal pain, blood in stool, constipation, nausea and vomiting.   Endocrine: Negative for cold intolerance and heat intolerance.   Genitourinary:  Negative for difficulty urinating, dysuria, flank pain, frequency and urgency.   Musculoskeletal:  Positive for arthralgias (R shoulder, wrist, BLE). Negative for back pain, joint swelling and myalgias.   Skin:  Negative for rash and wound.   Neurological:  Positive for dizziness and syncope. Negative for seizures, facial asymmetry, weakness, light-headedness, numbness and headaches.   Psychiatric/Behavioral:  Negative for confusion and hallucinations.    Objective:     Vital Signs (Most Recent):  Temp: 98.6 °F (37 °C) (03/03/22 0230)  Pulse: 68 (03/03/22 0230)  Resp: 18 (03/03/22 0230)  BP: 122/63 (03/03/22 0230)  SpO2: 98 % (03/03/22 0230) Vital Signs (24h Range):  Temp:  [98.5 °F (36.9 °C)-98.9 °F (37.2 °C)] 98.6 °F (37 °C)  Pulse:  [68-83] 68  Resp:  [18-20] 18  SpO2:  [92 %-100 %] 98 %  BP: (100-126)/(56-67) 122/63     Weight: 90.2 kg (198 lb 13.7 oz)  Body mass index is 33.09 kg/m².    Physical Exam  Vitals reviewed.   Constitutional:       General: He is not in acute distress.     Appearance: Normal appearance. He is normal weight. He is ill-appearing. He is not toxic-appearing.   HENT:      Head: Normocephalic and atraumatic.      Nose: Nose normal. No congestion.      Mouth/Throat:      Mouth: Mucous membranes are moist.   Eyes:      Pupils: Pupils are equal, round, and reactive to light.   Cardiovascular:      Rate and Rhythm: Normal rate and regular rhythm.      Pulses: Normal pulses.      Heart sounds: Normal heart sounds.   Pulmonary:      Effort: Pulmonary effort is normal. No respiratory distress.      Breath " sounds: Normal breath sounds. No wheezing or rhonchi.   Abdominal:      General: Abdomen is flat. Bowel sounds are normal. There is no distension.      Palpations: Abdomen is soft.      Tenderness: There is no abdominal tenderness. There is no rebound.   Musculoskeletal:         General: Deformity (L 1st toe amputation) present. Normal range of motion.      Cervical back: Normal range of motion and neck supple. No tenderness.   Skin:     General: Skin is warm and dry.      Capillary Refill: Capillary refill takes less than 2 seconds.      Findings: No bruising or erythema.   Neurological:      General: No focal deficit present.      Mental Status: He is alert and oriented to person, place, and time.      Sensory: No sensory deficit.      Motor: No weakness.   Psychiatric:         Mood and Affect: Mood normal.         Behavior: Behavior normal.         Thought Content: Thought content normal.         Judgment: Judgment normal.          Significant Labs:   Results for orders placed or performed during the hospital encounter of 03/02/22   Clostridium difficile EIA    Specimen: Stool   Result Value Ref Range    C. diff Antigen Negative Negative    C difficile Toxins A+B, EIA Negative Negative   CBC auto differential   Result Value Ref Range    WBC 4.96 3.90 - 12.70 K/uL    RBC 3.18 (L) 4.60 - 6.20 M/uL    Hemoglobin 7.3 (L) 14.0 - 18.0 g/dL    Hematocrit 24.8 (L) 40.0 - 54.0 %    MCV 78 (L) 82 - 98 fL    MCH 23.0 (L) 27.0 - 31.0 pg    MCHC 29.4 (L) 32.0 - 36.0 g/dL    RDW 20.1 (H) 11.5 - 14.5 %    Platelets 229 150 - 450 K/uL    MPV 10.2 9.2 - 12.9 fL    Immature Granulocytes 0.2 0.0 - 0.5 %    Gran # (ANC) 2.4 1.8 - 7.7 K/uL    Immature Grans (Abs) 0.01 0.00 - 0.04 K/uL    Lymph # 1.6 1.0 - 4.8 K/uL    Mono # 0.8 0.3 - 1.0 K/uL    Eos # 0.2 0.0 - 0.5 K/uL    Baso # 0.01 0.00 - 0.20 K/uL    nRBC 0 0 /100 WBC    Gran % 47.8 38.0 - 73.0 %    Lymph % 31.5 18.0 - 48.0 %    Mono % 16.1 (H) 4.0 - 15.0 %    Eosinophil % 4.2  0.0 - 8.0 %    Basophil % 0.2 0.0 - 1.9 %    Differential Method Automated    Comprehensive metabolic panel   Result Value Ref Range    Sodium 140 136 - 145 mmol/L    Potassium 3.7 3.5 - 5.1 mmol/L    Chloride 101 95 - 110 mmol/L    CO2 27 23 - 29 mmol/L    Glucose 75 70 - 110 mg/dL    BUN 23 8 - 23 mg/dL    Creatinine 1.7 (H) 0.5 - 1.4 mg/dL    Calcium 8.5 (L) 8.7 - 10.5 mg/dL    Total Protein 6.8 6.0 - 8.4 g/dL    Albumin 3.1 (L) 3.5 - 5.2 g/dL    Total Bilirubin 0.2 0.1 - 1.0 mg/dL    Alkaline Phosphatase 68 55 - 135 U/L    AST 19 10 - 40 U/L    ALT 11 10 - 44 U/L    Anion Gap 12 8 - 16 mmol/L    eGFR if African American 48 (A) >60 mL/min/1.73 m^2    eGFR if non African American 42 (A) >60 mL/min/1.73 m^2   Troponin I #1   Result Value Ref Range    Troponin I 0.123 (H) 0.000 - 0.026 ng/mL   BNP   Result Value Ref Range     (H) 0 - 99 pg/mL   Troponin I   Result Value Ref Range    Troponin I 0.113 (H) 0.000 - 0.026 ng/mL   Occult blood x 1, stool    Specimen: Stool   Result Value Ref Range    Occult Blood Positive (A) Negative   Rotavirus antigen, stool   Result Value Ref Range    Rotavirus Negative Negative   POCT COVID-19 Rapid Screening   Result Value Ref Range    POC Rapid COVID Negative Negative     Acceptable Yes    Type & Screen   Result Value Ref Range    Group & Rh A POS     Indirect Berry NEG    Prepare RBC 1 Unit   Result Value Ref Range    UNIT NUMBER [id number]     Product Code I0521N70     DISPENSE STATUS TRANSFUSED     CODING SYSTEM [id number]     Unit Blood Type Code 6200     Unit Blood Type A POS     Unit Expiration [id number]      *Note: Due to a large number of results and/or encounters for the requested time period, some results have not been displayed. A complete set of results can be found in Results Review.         Significant Imaging:   Imaging Results              X-Ray Hips Bilateral 2 View Incl AP Pelvis (Final result)  Result time 03/02/22 19:39:49      Final  result by Denise Cain MD (03/02/22 19:39:49)                   Impression:      No acute fracture or dislocation.  If symptoms persist, recommend non emergent MRI for further evaluation.      Electronically signed by: Ant Walker  Date:    03/02/2022  Time:    19:39               Narrative:    EXAMINATION:  XR HIPS BILATERAL 2 VIEW INCL AP PELVIS    CLINICAL HISTORY:  Pain in right hip    TECHNIQUE:  AP view of the pelvis and frogleg lateral views of both hips were performed.    COMPARISON:  None.    FINDINGS:  No acute fracture or dislocation.  Large bony prominence involving the superior aspect of the acetabulum above the hip joint may be a source of impingement.  Tubular calcification in the pelvis.  Vascular calcification also noted.  Mild degenerative joint disease.                                       X-Ray Chest AP Portable (Final result)  Result time 03/02/22 19:37:31      Final result by Denise Cain MD (03/02/22 19:37:31)                   Impression:      Improvement in basilar aeration.  Persistent cardiomegaly.      Electronically signed by: Ant Walker  Date:    03/02/2022  Time:    19:37               Narrative:    EXAMINATION:  XR CHEST AP PORTABLE    CLINICAL HISTORY:  fall;    TECHNIQUE:  Single frontal view of the chest was performed.    COMPARISON:  Prior    FINDINGS:  Left shoulder prosthesis.  Mild cardiomegaly.  Improvement in basilar interstitial opacities compared to prior exam.    Bones are intact.                                       X-Ray Shoulder Trauma Right (Final result)  Result time 03/02/22 19:36:13      Final result by Denise Cain MD (03/02/22 19:36:13)                   Impression:      As above      Electronically signed by: Ant Walker  Date:    03/02/2022  Time:    19:36               Narrative:    EXAMINATION:  XR SHOULDER TRAUMA 3 VIEW RIGHT    CLINICAL HISTORY:  Pain in right shoulder    TECHNIQUE:  Three or four views of the right shoulder were  performed.    COMPARISON:  None    FINDINGS:  AC joint is widened.  Correlate to point tenderness.  Degenerative joint disease of glenohumeral joint.  No acute fracture or dislocation.                                       CT Head Without Contrast (Final result)  Result time 03/02/22 19:15:25      Final result by Jose Mishra MD (03/02/22 19:15:25)                   Impression:      No acute intracranial findings.    All CT scans at this facility are performed  using dose modulation techniques as appropriate to performed exam including the following:  automated exposure control; adjustment of mA and/or kV according to the patients size (this includes techniques or standardized protocols for targeted exams where dose is matched to indication/reason for exam: i.e. extremities or head);  iterative reconstruction technique.      Electronically signed by: Jose Mishra  Date:    03/02/2022  Time:    19:15               Narrative:    EXAMINATION:  CT HEAD WITHOUT CONTRAST    CLINICAL HISTORY:  Head trauma, moderate-severe;    FINDINGS:  No intracranial hemorrhage or acute findings are demonstrated.  Low-density changes in the periventricular white matter consistent with small vessel ischemia.  The visualized paranasal sinuses are clear. The calvarium is intact.  There is some metallic opacities in the soft tissues of the left temporal region.  These were present on 05/09/2018.                                          Results for orders placed or performed during the hospital encounter of 02/25/22   EKG 12-lead    Collection Time: 02/25/22  9:12 AM    Narrative    Test Reason : I10    Vent. Rate : 077 BPM     Atrial Rate : 077 BPM     P-R Int : 254 ms          QRS Dur : 110 ms      QT Int : 418 ms       P-R-T Axes : 020 -60 122 degrees     QTc Int : 473 ms    Sinus rhythm with 1st degree A-V block  Incomplete right bundle branch block  Left anterior fascicular block  Septal infarct ,age undetermined  ST and T wave  abnormality, consider lateral ischemia  Abnormal ECG  When compared with ECG of 03-AUG-2021 08:50,  Premature supraventricular complexes are no longer Present  TN interval has increased  Septal infarct is now Present  Confirmed by RASHI NOBLE MD ([id number]) on 2/26/2022 8:27:37 AM    Referred By: MERA NUNEZ           Confirmed By:RASHI NOBLE MD                      The EKG was ordered, reviewed, and independently interpreted by the ED provider.  Interpretation time: 18:31  Rate: 72 BPM  Rhythm: Sinus rhythm with marked sinus arrhythmia with 1st degree AV block  Interpretation: Left anterior fascicular block. LVH. Septal infarct, age undetermined. No STEMI.  When compared to EKG performed on 2/25/22, there are no significant changes.

## 2022-03-03 NOTE — SUBJECTIVE & OBJECTIVE
"Interval History:   S/P P-RBC x 1 unit. GI consult obtained and plan for EGD/Colonoscopy tomorrow. BP meds were adjusted to BP trend. Monitor vitals.       Review of Systems   Constitutional:  Positive for activity change and fatigue. Negative for chills, diaphoresis and fever.   HENT:  Negative for congestion, trouble swallowing and voice change.    Eyes:  Negative for photophobia and discharge.   Respiratory:  Negative for cough, chest tightness, shortness of breath and wheezing.    Cardiovascular:  Negative for chest pain, palpitations and leg swelling.   Gastrointestinal:  Positive for blood in stool and diarrhea ("black"). Negative for abdominal pain, constipation, nausea and vomiting.   Endocrine: Negative for cold intolerance and heat intolerance.   Genitourinary:  Negative for difficulty urinating, dysuria, flank pain, frequency and urgency.   Musculoskeletal:  Positive for arthralgias (R shoulder, wrist, BLE). Negative for back pain, joint swelling and myalgias.   Skin:  Negative for rash and wound.   Neurological:  Positive for dizziness and syncope. Negative for seizures, facial asymmetry, weakness, light-headedness, numbness and headaches.   Psychiatric/Behavioral:  Negative for confusion and hallucinations.    Objective:     Vital Signs (Most Recent):  Temp: 97.6 °F (36.4 °C) (03/03/22 1201)  Pulse: 72 (03/03/22 1217)  Resp: 18 (03/03/22 1217)  BP: 136/79 (03/03/22 1201)  SpO2: 96 % (03/03/22 1217) Vital Signs (24h Range):  Temp:  [97.6 °F (36.4 °C)-98.9 °F (37.2 °C)] 97.6 °F (36.4 °C)  Pulse:  [68-83] 72  Resp:  [18-20] 18  SpO2:  [92 %-100 %] 96 %  BP: (100-136)/(56-79) 136/79     Weight: 89.8 kg (198 lb)  Body mass index is 32.95 kg/m².    Intake/Output Summary (Last 24 hours) at 3/3/2022 1517  Last data filed at 3/3/2022 0835  Gross per 24 hour   Intake 360.83 ml   Output 600 ml   Net -239.17 ml      Physical Exam  Vitals reviewed.   Constitutional:       General: He is not in acute distress.     " Appearance: Normal appearance. He is normal weight. He is ill-appearing. He is not toxic-appearing.   HENT:      Head: Normocephalic and atraumatic.      Nose: Nose normal. No congestion.      Mouth/Throat:      Mouth: Mucous membranes are moist.   Eyes:      Pupils: Pupils are equal, round, and reactive to light.   Cardiovascular:      Rate and Rhythm: Normal rate and regular rhythm.      Pulses: Normal pulses.      Heart sounds: Normal heart sounds.   Pulmonary:      Effort: Pulmonary effort is normal. No respiratory distress.      Breath sounds: Normal breath sounds. No wheezing or rhonchi.   Abdominal:      General: Abdomen is flat. Bowel sounds are normal. There is no distension.      Palpations: Abdomen is soft.      Tenderness: There is no abdominal tenderness. There is no rebound.   Musculoskeletal:         General: Deformity (L 1st toe amputation) present. Normal range of motion.      Cervical back: Normal range of motion and neck supple. No tenderness.   Skin:     General: Skin is warm and dry.      Capillary Refill: Capillary refill takes less than 2 seconds.      Findings: No bruising or erythema.   Neurological:      General: No focal deficit present.      Mental Status: He is alert and oriented to person, place, and time.      Sensory: No sensory deficit.      Motor: No weakness.   Psychiatric:         Mood and Affect: Mood normal.         Behavior: Behavior normal.         Thought Content: Thought content normal.         Judgment: Judgment normal.       Significant Labs: All pertinent labs within the past 24 hours have been reviewed.  BMP:   Recent Labs   Lab 03/03/22  0503   GLU 92      K 4.1      CO2 27   BUN 27*   CREATININE 1.5*   CALCIUM 7.8*   MG 2.0     CBC:   Recent Labs   Lab 03/02/22  1835 03/03/22  0503   WBC 4.96 5.76   HGB 7.3* 8.3*   HCT 24.8* 28.0*    208     CMP:   Recent Labs   Lab 03/02/22  1835 03/03/22  0503    141   K 3.7 4.1    105   CO2 27 27   GLU  75 92   BUN 23 27*   CREATININE 1.7* 1.5*   CALCIUM 8.5* 7.8*   PROT 6.8 6.8   ALBUMIN 3.1* 3.0*   BILITOT 0.2 0.3   ALKPHOS 68 72   AST 19 18   ALT 11 14   ANIONGAP 12 9   EGFRNONAA 42* 49*     Cardiac Markers:   Recent Labs   Lab 03/02/22  1835   *       Significant Imaging:

## 2022-03-03 NOTE — PROGRESS NOTES
[facility] Telemetry ([facility])  [facility] Medicine  Progress Note    Patient Name: Crow Green  MRN: [id number]  Patient Class: OP- Observation   Admission Date: 3/2/2022  Length of Stay: 0 days  Attending Physician: Davina Donovan MD  Primary Care Provider: Mateo Lambert DO        Subjective:     Principal Problem:GI bleed        HPI:  67 y/o AA M with hx of CAD, combined HF, DM2, pAfib, anemia, B hip/shoulder pain, HTN, SANDRITA on CPAP, asthma, gout, GERD, glaucoma, arthritis, CKD 3, HLD, back pain, depression, DOMINIK, COPD to ED after experiencing a ground-level fall immediately PTA - states he did not hit is head but felt his knees go out from under him - is not sure if he had LOC - currently reporting pain to the R shoulder, wrist and BLE. Also reporting diarrhea for the past 4 days consisting of black stool. Symptoms were episodic and of moderate severity, without known mitigating or exacerbating factors. Denies chest pain, edema, palpitations, SOB, wheezing, abdominal pain, N/V/D, hematochezia, constipation, dysuria, flank pain, HA, weakness, fever, cough, chills. Vital signs stable in ED - pt was afebrile; WBCs 4.96, H&H 7.03&24.8, platelets 229, Na 140, K+ 3.7, BUN 23, creatinine 1.7, GFR 48, , troponin 0.113; EKG showed sinus arrythmia with 1st degree bundle branch block; xrays showed no acute fracture or dislocation; Stool for c.diff negative, stool for OCB positive; rotavirus negative, COVID-19 negative; CT of the head showed no acute findings; Cxray showed improvement over previous. In ED the pt was given norco, immodium and 1L IV fluids - remained stable throughout without re-occurrence of symptoms. Hospital medicine was called and the pt was placed in observation with telemetry monitoring. Pt is a full code - surrogate decision maker is his sister, Gretchen Green.       Overview/Hospital Course:  H/O PAF, CAD, combined CHF( EF 45%) on ASA/ Eliquis admitted for symptomatic anemia and  "melena. Hgb on admission was 7.3 with baseline 10 to 11. S/P 1 unit of P-RBC in the ED with repeat Hgb 8.3 this morning. Asa/Eliquis held and Pt started on IV PPI and GI consulted.     3/3- Monitor H/H. Continue PPI. Continue to hold ASA, Eliquis for now. GI consult obtained and plan for EGD/Colonoscopy tomorrow. BP meds were adjusted to BP trend. Monitor vitals.       Interval History:   S/P P-RBC x 1 unit. GI consult obtained and plan for EGD/Colonoscopy tomorrow. BP meds were adjusted to BP trend. Monitor vitals.       Review of Systems   Constitutional:  Positive for activity change and fatigue. Negative for chills, diaphoresis and fever.   HENT:  Negative for congestion, trouble swallowing and voice change.    Eyes:  Negative for photophobia and discharge.   Respiratory:  Negative for cough, chest tightness, shortness of breath and wheezing.    Cardiovascular:  Negative for chest pain, palpitations and leg swelling.   Gastrointestinal:  Positive for blood in stool and diarrhea ("black"). Negative for abdominal pain, constipation, nausea and vomiting.   Endocrine: Negative for cold intolerance and heat intolerance.   Genitourinary:  Negative for difficulty urinating, dysuria, flank pain, frequency and urgency.   Musculoskeletal:  Positive for arthralgias (R shoulder, wrist, BLE). Negative for back pain, joint swelling and myalgias.   Skin:  Negative for rash and wound.   Neurological:  Positive for dizziness and syncope. Negative for seizures, facial asymmetry, weakness, light-headedness, numbness and headaches.   Psychiatric/Behavioral:  Negative for confusion and hallucinations.    Objective:     Vital Signs (Most Recent):  Temp: 97.6 °F (36.4 °C) (03/03/22 1201)  Pulse: 72 (03/03/22 1217)  Resp: 18 (03/03/22 1217)  BP: 136/79 (03/03/22 1201)  SpO2: 96 % (03/03/22 1217) Vital Signs (24h Range):  Temp:  [97.6 °F (36.4 °C)-98.9 °F (37.2 °C)] 97.6 °F (36.4 °C)  Pulse:  [68-83] 72  Resp:  [18-20] 18  SpO2:  [92 " %-100 %] 96 %  BP: (100-136)/(56-79) 136/79     Weight: 89.8 kg (198 lb)  Body mass index is 32.95 kg/m².    Intake/Output Summary (Last 24 hours) at 3/3/2022 1517  Last data filed at 3/3/2022 0835  Gross per 24 hour   Intake 360.83 ml   Output 600 ml   Net -239.17 ml      Physical Exam  Vitals reviewed.   Constitutional:       General: He is not in acute distress.     Appearance: Normal appearance. He is normal weight. He is ill-appearing. He is not toxic-appearing.   HENT:      Head: Normocephalic and atraumatic.      Nose: Nose normal. No congestion.      Mouth/Throat:      Mouth: Mucous membranes are moist.   Eyes:      Pupils: Pupils are equal, round, and reactive to light.   Cardiovascular:      Rate and Rhythm: Normal rate and regular rhythm.      Pulses: Normal pulses.      Heart sounds: Normal heart sounds.   Pulmonary:      Effort: Pulmonary effort is normal. No respiratory distress.      Breath sounds: Normal breath sounds. No wheezing or rhonchi.   Abdominal:      General: Abdomen is flat. Bowel sounds are normal. There is no distension.      Palpations: Abdomen is soft.      Tenderness: There is no abdominal tenderness. There is no rebound.   Musculoskeletal:         General: Deformity (L 1st toe amputation) present. Normal range of motion.      Cervical back: Normal range of motion and neck supple. No tenderness.   Skin:     General: Skin is warm and dry.      Capillary Refill: Capillary refill takes less than 2 seconds.      Findings: No bruising or erythema.   Neurological:      General: No focal deficit present.      Mental Status: He is alert and oriented to person, place, and time.      Sensory: No sensory deficit.      Motor: No weakness.   Psychiatric:         Mood and Affect: Mood normal.         Behavior: Behavior normal.         Thought Content: Thought content normal.         Judgment: Judgment normal.       Significant Labs: All pertinent labs within the past 24 hours have been  reviewed.  BMP:   Recent Labs   Lab 03/03/22  0503   GLU 92      K 4.1      CO2 27   BUN 27*   CREATININE 1.5*   CALCIUM 7.8*   MG 2.0     CBC:   Recent Labs   Lab 03/02/22  1835 03/03/22  0503   WBC 4.96 5.76   HGB 7.3* 8.3*   HCT 24.8* 28.0*    208     CMP:   Recent Labs   Lab 03/02/22  1835 03/03/22  0503    141   K 3.7 4.1    105   CO2 27 27   GLU 75 92   BUN 23 27*   CREATININE 1.7* 1.5*   CALCIUM 8.5* 7.8*   PROT 6.8 6.8   ALBUMIN 3.1* 3.0*   BILITOT 0.2 0.3   ALKPHOS 68 72   AST 19 18   ALT 11 14   ANIONGAP 12 9   EGFRNONAA 42* 49*     Cardiac Markers:   Recent Labs   Lab 03/02/22  1835   *       Significant Imaging:       Assessment/Plan:      * GI bleed  Stool for OCB positive in ED  Gastroenterology consulted  1 unit PRBCs transfused  Trend H&H, transfuse as indicated  Protonix 40mg IV BID  NPO in case of AM testing/procedure  Holding eliquis, ASA for now - resume as appropriate  3/3-  GI consult obtained and plan for EGD/Colonoscopy tomorrow  NPO after midnight     Symptomatic anemia  Likely secondary to GI bleed  Stool for OCB positive  H&H at 7.3&24.8 in ED with baseline 10 to 11  Gastroenterology consulted  Type and screen sent  1 unit PRBCs given  Trend H&H - transfuse as indicated  Iron studies pending  Ferritin 24 suggestive of depleted iron store   Holding ASA, eliquis for now  NPO after midnight       Syncope and collapse  Likely sequelae of anemia  Orthostatic VS as ordered  VS per unit routine  Fall precautions  ECHO ordered   B carotid doppler u/s ordered   Continuous cardiac monitoring      PAF (paroxysmal atrial fibrillation)  Currently sinus arrythmia - rate controlled  Continue home medications as appropriate  Holding home eliquis/ASA for now d/t GI bleed - resume as appropriate  Continuous cardiac monitoring    Chronic combined systolic and diastolic congestive heart failure  Echo - 05/25/2021 - EF 40%, grade 1 diastolic dysfunction  Appears clinically  compensated  Continue home medications  Cardiac diet  Strict I&Os  Daily weights  ECHO ordered on admit     Nonobstructive coronary artery disease of native artery of native heart  Denies chest pain - EKG unrevealing  Continue home medications  Continuous cardiac monitoring  EKG prn      Diarrhea  - Work up ordered on admit   -Follow up stool studies       Essential hypertension  Currently well-controlled  Home medications reviewed and resumed as appropriate   Hydralazine IV prn SBP > 170  VS per unit routine  Monitor closely        Elevated troponin  Denies chest pain - EKG unrevealing  Troponin at 0.113 in ED - stable at baseline  Continue home meds as appropriate  Continuous cardiac monitoring  ECHO ordered on admit      H/O Asthma  No evidence of acute exacerbation  Supplemental O2 prn  Duonebs scheduled and prn    SANDRITA on CPAP  CPAP qHS at home settings  RT to adjust as needed      Chronic obstructive pulmonary disease  No evidence of acute exacerbation  Supplemental O2 prn  Duonebs scheduled and prn  Pulmicort nebs BID      Hyperlipidemia associated with type 2 diabetes mellitus  Continue home statin      Type 2 diabetes mellitus  A1c at 6.7 on 12/02/2021 / BG in ED at 75  Accuchecks with SSI prn   Holding home jardiance, ozempic  Hold Levemir while NPO          VTE Risk Mitigation (From admission, onward)         Ordered     Place sequential compression device  Until discontinued         03/03/22 0235                Discharge Planning   REJI:      Code Status: Full Code   Is the patient medically ready for discharge?:     Reason for patient still in hospital (select all that apply): Patient trending condition                     Davina Donovan MD  Department of Hospital Medicine   [facility] Telemetry ([facility])

## 2022-03-03 NOTE — ASSESSMENT & PLAN NOTE
Likely sequelae of anemia  Orthostatic VS as ordered  VS per unit routine  Fall precautions  ECHO pending  B carotid doppler u/s pending  TSH pending   Continuous cardiac monitoring

## 2022-03-03 NOTE — ED PROVIDER NOTES
"SCRIBE #1 NOTE: I, Johny Case, am scribing for, and in the presence of, Ewelina Kay MD. I have scribed the HPI, ROS, and PEx.     SCRIBE #2 NOTE: I, Dorota Ramirez, am scribing for, and in the presence of,  Marina Mtz MD. I have scribed the remaining portions of the note not scribed by Scribe #1.     History      Chief Complaint   Patient presents with    Fall     Ground level fall. Hit right side of head/ face. Also complaining of R hip and shoulder pain. 100mcg of fent given by [facility]       Review of patient's allergies indicates:   Allergen Reactions    Protamine Hives     Urticaria, possible upper airway swelling        HPI   HPI    3/2/2022, 6:27 PM   History obtained from the patient      History of Present Illness: Crow Green is a 66 y.o. male patient with a PMHx of Afib, DM2, CHF, COPD who presents to the Emergency Department for evaluation following a fall just PTA. Pt states that his legs "gave out" from under him, causing him to fall and land on his R side. Pt is unsure if he lost consciousness; he reports R shoulder pain, R wrist pain, and BLE pain. Symptoms are constant and moderate in severity. No mitigating or exacerbating factors reported. Patient denies any fever, chills, n/v/d, SOB, CP, weakness, numbness, dizziness, headache, and all other sxs at this time. No prior Tx reported. Pt is currently on Eliquis. No further complaints or concerns at this time.     Arrival mode: EMS    PCP: Mateo Lambert DO       Past Medical History:  Past Medical History:   Diagnosis Date    Arthritis     Asthma     Atrial fibrillation     Atrial fibrillation with RVR 8/7/2019    CHF (congestive heart failure)     Chronic obstructive pulmonary disease 8/5/2020    Depression     Dermatomyositis     Diabetes mellitus, type 2 Diagnosed in 2000    Elevated PSA     Follow up 7/30/2020    Hypertension     Myocardial infarction     2017    Sleep apnea        Past Surgical History:  Past " Surgical History:   Procedure Laterality Date    ABLATION OF ARRHYTHMOGENIC FOCUS FOR ATRIAL FIBRILLATION N/A 2/27/2020    Procedure: Ablation atrial fibrillation;  Surgeon: Gio Borwn MD;  Location[facility] EP LAB;  Service: Cardiology;  Laterality: N/A;  afib, DAMIEN (cx if SR), PVI, PITO, anes, MB, 3 Prep    CHOLECYSTECTOMY      CLOSURE OF WOUND Left 7/1/2020    Procedure: CLOSURE, WOUND;  Surgeon: Barb Veras DPM;  Location: [facility];  Service: Podiatry;  Laterality: Left;    INJECTION OF ANESTHETIC AGENT INTO SACROILIAC JOINT Left 3/24/2021    Procedure: Left BLOCK, SACROILIAC JOINT and bilateral rhomboid TPI;  Surgeon: Dillan Tsai MD;  Location: [facility] PAIN MGT;  Service: Pain Management;  Laterality: Left;    REVERSE TOTAL SHOULDER ARTHROPLASTY Left 1/10/2022    Procedure: ARTHROPLASTY, SHOULDER, TOTAL, REVERSE;  Surgeon: Anthony Alvarado MD;  Location: [facility] OR;  Service: Orthopedics;  Laterality: Left;  left reverse total shoulder arthroplasty     SELECTIVE INJECTION OF ANESTHETIC AGENT AROUND LUMBAR SPINAL NERVE ROOT BY TRANSFORAMINAL APPROACH Left 6/11/2020    Procedure: BLOCK, SPINAL NERVE ROOT, LUMBAR, SELECTIVE, TRANSFORAMINAL APPROACH Left L4-5, L5-S1 TFESI with RN IV sedation;  Surgeon: Dillan Tsai MD;  Location: [facility] PAIN MGT;  Service: Pain Management;  Laterality: Left;    TOE AMPUTATION Left 6/29/2020    Procedure: AMPUTATION, TOE;  Surgeon: Barb Veras DPM;  Location: [facility] OR;  Service: Podiatry;  Laterality: Left;  great toe         Family History:  Family History   Problem Relation Age of Onset    Glaucoma Mother     Cataracts Mother     Diabetes Mother     Cataracts Father     Stroke Father     Glaucoma Sister         x3    Cataracts Sister         x3    Diabetes Sister         x1    Stroke Sister         x1    Glaucoma Maternal Aunt     Diabetes Maternal Aunt     No Known Problems Brother     No Known Problems Daughter     No Known Problems Son      Cancer Maternal Grandfather         lung (smoker)    Prostate cancer Neg Hx     Heart disease Neg Hx     Kidney disease Neg Hx        Social History:  Social History     Tobacco Use    Smoking status: Never Smoker    Smokeless tobacco: Never Used   Substance and Sexual Activity    Alcohol use: Yes     Comment: rare, maybe holidays    Drug use: Not Currently     Types: Cocaine    Sexual activity: Not Currently     Partners: Female       ROS   Review of Systems   Constitutional: Negative for chills and fever.   HENT: Negative for sore throat.    Respiratory: Negative for shortness of breath.    Cardiovascular: Negative for chest pain.   Gastrointestinal: Negative for diarrhea, nausea and vomiting.   Genitourinary: Negative for dysuria.   Musculoskeletal: Positive for arthralgias (R shoulder, R wrist) and myalgias (BLE). Negative for back pain.   Skin: Negative for rash.   Neurological: Negative for dizziness, weakness, light-headedness, numbness and headaches.   Hematological: Does not bruise/bleed easily.   All other systems reviewed and are negative.    Physical Exam      Initial Vitals [03/02/22 1807]   BP Pulse Resp Temp SpO2   (!) 125/58 83 18 98.9 °F (37.2 °C) (!) 94 %      MAP       --          Physical Exam  Nursing Notes and Vital Signs Reviewed.  Constitutional: Patient is in no acute distress. Well-developed and well-nourished.  Head: Atraumatic. Normocephalic.  Eyes: PERRL. EOM intact. Conjunctivae are not pale. No scleral icterus.  ENT: Mucous membranes are moist. Oropharynx is clear and symmetric.    Neck: Supple. Full ROM. No lymphadenopathy.  Cardiovascular: Regular rate. Regular rhythm. No murmurs, rubs, or gallops. Distal pulses are 2+ and symmetric.  Pulmonary/Chest: No respiratory distress. Clear to auscultation bilaterally. No wheezing or rales.  Abdominal: Soft and non-distended.  There is no tenderness.  No rebound, guarding, or rigidity.  Musculoskeletal: Moves all extremities. No  obvious deformities. No edema. Full ROM of R shoulder. Pelvis is stable. No overt evidence of trauma.  Skin: Warm and dry.  Neurological:  Alert, awake, and appropriate.  Normal speech.  No acute focal neurological deficits are appreciated.  Psychiatric: Normal affect. Good eye contact. Appropriate in content.    ED Course    Critical Care    Date/Time: 3/2/2022 9:41 PM  Performed by: Marina Mtz MD  Authorized by: Marina Mtz MD   Direct patient critical care time: 10 minutes  Additional history critical care time: 10 minutes  Ordering / reviewing critical care time: 5 minutes  Documentation critical care time: 5 minutes  Consulting other physicians critical care time: 5 minutes  Total critical care time (exclusive of procedural time) : 35 minutes  Critical care time was exclusive of separately billable procedures and treating other patients and teaching time.  Critical care was necessary to treat or prevent imminent or life-threatening deterioration of the following conditions: Symptomatic anemia.  Critical care was time spent personally by me on the following activities: blood draw for specimens, development of treatment plan with patient or surrogate, discussions with consultants, interpretation of cardiac output measurements, examination of patient, ordering and performing treatments and interventions, re-evaluation of patient's condition, ordering and review of radiographic studies, review of old charts, pulse oximetry, ordering and review of laboratory studies, obtaining history from patient or surrogate and evaluation of patient's response to treatment.        ED Vital Signs:  Vitals:    03/02/22 1807 03/02/22 1900 03/02/22 1934 03/02/22 1936   BP: (!) 125/58 113/62 109/62 (!) 100/56   Pulse: 83      Resp: 18 19 18 19   Temp: 98.9 °F (37.2 °C)      TempSrc: Oral      SpO2: (!) 94% (!) 92% 96% 98%   Weight: 87.1 kg (192 lb)      Height:        03/02/22 2216 03/02/22 2231 03/02/22 2245 03/02/22 2359   BP:   "116/61 105/64 124/63   Pulse:  78 79 77   Resp: 18 19 18 20   Temp:  98.6 °F (37 °C) 98.7 °F (37.1 °C) 98.5 °F (36.9 °C)   TempSrc:  Oral Oral Oral   SpO2:  96% 96% 100%   Weight:    90.2 kg (198 lb 13.7 oz)   Height:    5' 5" (1.651 m)    03/03/22 0100 03/03/22 0200 03/03/22 0230 03/03/22 0300   BP:  126/67 122/63    Pulse: 76 73 68 76   Resp:  20 18    Temp:  98.8 °F (37.1 °C) 98.6 °F (37 °C)    TempSrc:  Oral Oral    SpO2:  100% 98%    Weight:       Height:        03/03/22 0448   BP: 126/74   Pulse: 72   Resp: 18   Temp: 98.4 °F (36.9 °C)   TempSrc: Oral   SpO2: 100%   Weight:    Height:        Abnormal Lab Results:  Labs Reviewed   CBC W/ AUTO DIFFERENTIAL - Abnormal; Notable for the following components:       Result Value    RBC 3.18 (*)     Hemoglobin 7.3 (*)     Hematocrit 24.8 (*)     MCV 78 (*)     MCH 23.0 (*)     MCHC 29.4 (*)     RDW 20.1 (*)     Mono % 16.1 (*)     All other components within normal limits   COMPREHENSIVE METABOLIC PANEL - Abnormal; Notable for the following components:    Creatinine 1.7 (*)     Calcium 8.5 (*)     Albumin 3.1 (*)     eGFR if  48 (*)     eGFR if non  42 (*)     All other components within normal limits   TROPONIN I - Abnormal; Notable for the following components:    Troponin I 0.123 (*)     All other components within normal limits   B-TYPE NATRIURETIC PEPTIDE - Abnormal; Notable for the following components:     (*)     All other components within normal limits   TROPONIN I - Abnormal; Notable for the following components:    Troponin I 0.113 (*)     All other components within normal limits   OCCULT BLOOD X 1, STOOL - Abnormal; Notable for the following components:    Occult Blood Positive (*)     All other components within normal limits   SARS-COV-2 RDRP GENE - Normal    Narrative:     This test utilizes isothermal nucleic acid amplification   technology to detect the SARS-CoV-2 RdRp nucleic acid segment.   The analytical " sensitivity (limit of detection) is 125 genome   equivalents/mL.   A POSITIVE result implies infection with the SARS-CoV-2 virus;   the patient is presumed to be contagious.     A NEGATIVE result means that SARS-CoV-2 nucleic acids are not   present above the limit of detection. A NEGATIVE result should be   treated as presumptive. It does not rule out the possibility of   COVID-19 and should not be the sole basis for treatment decisions.   If COVID-19 is strongly suspected based on clinical and exposure   history, re-testing using an alternate molecular assay should be   considered.   This test is only for use under the Food and Drug   Administration s Emergency Use Authorization (EUA).   Commercial kits are provided by [employer]   Performance characteristics of the EUA have been independently   verified by [facility] Department of   Pathology and Laboratory Medicine.   _________________________________________________________________   The authorized Fact Sheet for Healthcare Providers and the authorized Fact   Sheet for Patients of the ID NOW COVID-19 are available on the FDA   website:     https://www.fda.gov/media/990196/download  https://www.fda.gov/media/501607/download           CLOSTRIDIUM DIFFICILE   ROTAVIRUS ANTIGEN, STOOL   H. PYLORI ANTIGEN, STOOL   WBC, STOOL   TYPE & SCREEN   PREPARE RBC SOFT        All Lab Results:  Results for orders placed or performed during the hospital encounter of 03/02/22   Clostridium difficile EIA    Specimen: Stool   Result Value Ref Range    C. diff Antigen Negative Negative    C difficile Toxins A+B, EIA Negative Negative   CBC auto differential   Result Value Ref Range    WBC 4.96 3.90 - 12.70 K/uL    RBC 3.18 (L) 4.60 - 6.20 M/uL    Hemoglobin 7.3 (L) 14.0 - 18.0 g/dL    Hematocrit 24.8 (L) 40.0 - 54.0 %    MCV 78 (L) 82 - 98 fL    MCH 23.0 (L) 27.0 - 31.0 pg    MCHC 29.4 (L) 32.0 - 36.0 g/dL    RDW 20.1 (H) 11.5 - 14.5 %    Platelets 229 150 -  450 K/uL    MPV 10.2 9.2 - 12.9 fL    Immature Granulocytes 0.2 0.0 - 0.5 %    Gran # (ANC) 2.4 1.8 - 7.7 K/uL    Immature Grans (Abs) 0.01 0.00 - 0.04 K/uL    Lymph # 1.6 1.0 - 4.8 K/uL    Mono # 0.8 0.3 - 1.0 K/uL    Eos # 0.2 0.0 - 0.5 K/uL    Baso # 0.01 0.00 - 0.20 K/uL    nRBC 0 0 /100 WBC    Gran % 47.8 38.0 - 73.0 %    Lymph % 31.5 18.0 - 48.0 %    Mono % 16.1 (H) 4.0 - 15.0 %    Eosinophil % 4.2 0.0 - 8.0 %    Basophil % 0.2 0.0 - 1.9 %    Differential Method Automated    Comprehensive metabolic panel   Result Value Ref Range    Sodium 140 136 - 145 mmol/L    Potassium 3.7 3.5 - 5.1 mmol/L    Chloride 101 95 - 110 mmol/L    CO2 27 23 - 29 mmol/L    Glucose 75 70 - 110 mg/dL    BUN 23 8 - 23 mg/dL    Creatinine 1.7 (H) 0.5 - 1.4 mg/dL    Calcium 8.5 (L) 8.7 - 10.5 mg/dL    Total Protein 6.8 6.0 - 8.4 g/dL    Albumin 3.1 (L) 3.5 - 5.2 g/dL    Total Bilirubin 0.2 0.1 - 1.0 mg/dL    Alkaline Phosphatase 68 55 - 135 U/L    AST 19 10 - 40 U/L    ALT 11 10 - 44 U/L    Anion Gap 12 8 - 16 mmol/L    eGFR if African American 48 (A) >60 mL/min/1.73 m^2    eGFR if non African American 42 (A) >60 mL/min/1.73 m^2   Troponin I #1   Result Value Ref Range    Troponin I 0.123 (H) 0.000 - 0.026 ng/mL   BNP   Result Value Ref Range     (H) 0 - 99 pg/mL   Troponin I   Result Value Ref Range    Troponin I 0.113 (H) 0.000 - 0.026 ng/mL   Occult blood x 1, stool    Specimen: Stool   Result Value Ref Range    Occult Blood Positive (A) Negative   Rotavirus antigen, stool   Result Value Ref Range    Rotavirus Negative Negative   POCT COVID-19 Rapid Screening   Result Value Ref Range    POC Rapid COVID Negative Negative     Acceptable Yes    Type & Screen   Result Value Ref Range    Group & Rh A POS     Indirect Berry NEG    Prepare RBC 1 Unit   Result Value Ref Range    UNIT NUMBER [id number]     Product Code [id number]     DISPENSE STATUS TRANSFUSED     CODING SYSTEM [id number]     Unit Blood Type Code 6200      Unit Blood Type A POS     Unit Expiration [id number]      *Note: Due to a large number of results and/or encounters for the requested time period, some results have not been displayed. A complete set of results can be found in Results Review.     Imaging Results:  Imaging Results          X-Ray Hips Bilateral 2 View Incl AP Pelvis (Final result)  Result time 03/02/22 19:39:49    Final result by Denise Cain MD (03/02/22 19:39:49)                 Impression:      No acute fracture or dislocation.  If symptoms persist, recommend non emergent MRI for further evaluation.      Electronically signed by: Ant Walker  Date:    03/02/2022  Time:    19:39             Narrative:    EXAMINATION:  XR HIPS BILATERAL 2 VIEW INCL AP PELVIS    CLINICAL HISTORY:  Pain in right hip    TECHNIQUE:  AP view of the pelvis and frogleg lateral views of both hips were performed.    COMPARISON:  None.    FINDINGS:  No acute fracture or dislocation.  Large bony prominence involving the superior aspect of the acetabulum above the hip joint may be a source of impingement.  Tubular calcification in the pelvis.  Vascular calcification also noted.  Mild degenerative joint disease.                               X-Ray Chest AP Portable (Final result)  Result time 03/02/22 19:37:31    Final result by Denise Cain MD (03/02/22 19:37:31)                 Impression:      Improvement in basilar aeration.  Persistent cardiomegaly.      Electronically signed by: Ant Walker  Date:    03/02/2022  Time:    19:37             Narrative:    EXAMINATION:  XR CHEST AP PORTABLE    CLINICAL HISTORY:  fall;    TECHNIQUE:  Single frontal view of the chest was performed.    COMPARISON:  Prior    FINDINGS:  Left shoulder prosthesis.  Mild cardiomegaly.  Improvement in basilar interstitial opacities compared to prior exam.    Bones are intact.                               X-Ray Shoulder Trauma Right (Final result)  Result time 03/02/22 19:36:13    Final result  by Denise Cain MD (03/02/22 19:36:13)                 Impression:      As above      Electronically signed by: Ant Walker  Date:    03/02/2022  Time:    19:36             Narrative:    EXAMINATION:  XR SHOULDER TRAUMA 3 VIEW RIGHT    CLINICAL HISTORY:  Pain in right shoulder    TECHNIQUE:  Three or four views of the right shoulder were performed.    COMPARISON:  None    FINDINGS:  AC joint is widened.  Correlate to point tenderness.  Degenerative joint disease of glenohumeral joint.  No acute fracture or dislocation.                               CT Head Without Contrast (Final result)  Result time 03/02/22 19:15:25    Final result by Jose Mishra MD (03/02/22 19:15:25)                 Impression:      No acute intracranial findings.    All CT scans at this facility are performed  using dose modulation techniques as appropriate to performed exam including the following:  automated exposure control; adjustment of mA and/or kV according to the patients size (this includes techniques or standardized protocols for targeted exams where dose is matched to indication/reason for exam: i.e. extremities or head);  iterative reconstruction technique.      Electronically signed by: Jose Mishra  Date:    03/02/2022  Time:    19:15             Narrative:    EXAMINATION:  CT HEAD WITHOUT CONTRAST    CLINICAL HISTORY:  Head trauma, moderate-severe;    FINDINGS:  No intracranial hemorrhage or acute findings are demonstrated.  Low-density changes in the periventricular white matter consistent with small vessel ischemia.  The visualized paranasal sinuses are clear. The calvarium is intact.  There is some metallic opacities in the soft tissues of the left temporal region.  These were present on 05/09/2018.                               The EKG was ordered, reviewed, and independently interpreted by the ED provider.  Interpretation time: 18:31  Rate: 72 BPM  Rhythm: Sinus rhythm with marked sinus arrhythmia with 1st degree AV  block  Interpretation: Left anterior fascicular block. LVH. Septal infarct, age undetermined. No STEMI.  When compared to EKG performed on 2/25/22, there are no significant changes.           The Emergency Provider reviewed the vital signs and test results, which are outlined above.    ED Discussion     8:00 PM: Dr. Kay transfers care of patient to Dr. Mtz pending lab results.    10:01 PM: Discussed case with Corie Mendieta NP ([facility]). Dr. Francis agrees with current care and management of pt and accepts admission.   Admitting Service: Hospital Medicine  Admitting Physician: Dr. Francis  Admit to: Tele, Obs             ED Medication(s):  Medications   0.9%  NaCl infusion (for blood administration) (has no administration in time range)   hydrALAZINE injection 10 mg (has no administration in time range)   albuterol-ipratropium 2.5 mg-0.5 mg/3 mL nebulizer solution 3 mL (has no administration in time range)   albuterol-ipratropium 2.5 mg-0.5 mg/3 mL nebulizer solution 3 mL (has no administration in time range)   budesonide nebulizer solution 0.5 mg (has no administration in time range)   pantoprazole injection 40 mg (has no administration in time range)   ondansetron injection 4 mg (has no administration in time range)   acetaminophen tablet 650 mg (has no administration in time range)   insulin detemir U-100 pen 20 Units (has no administration in time range)   isosorbide mononitrate 24 hr tablet 60 mg (has no administration in time range)   latanoprost 0.005 % ophthalmic solution 1 drop (has no administration in time range)   lisinopriL tablet 30 mg (has no administration in time range)   ranolazine 12 hr tablet 500 mg (has no administration in time range)   tamsulosin 24 hr capsule 0.4 mg (has no administration in time range)   dextrose 10% bolus 125 mL (has no administration in time range)   glucagon (human recombinant) injection 1 mg (has no administration in time range)   insulin aspart U-100  pen 0-5 Units (has no administration in time range)   amLODIPine tablet 2.5 mg (has no administration in time range)   atorvastatin tablet 40 mg (has no administration in time range)   metoprolol succinate (TOPROL-XL) 24 hr tablet 50 mg (has no administration in time range)   sertraline tablet 100 mg (has no administration in time range)   traZODone tablet 200 mg (has no administration in time range)   loperamide capsule 2 mg (2 mg Oral Given 3/2/22 2002)   sodium chloride 0.9% bolus 1,000 mL (0 mLs Intravenous Stopped 3/2/22 2102)   HYDROcodone-acetaminophen 5-325 mg per tablet 1 tablet (1 tablet Oral Given 3/2/22 2216)   pantoprazole injection 80 mg (80 mg Intravenous Given 3/3/22 0326)          Current Discharge Medication List            Medical Decision Making    Medical Decision Making:   Clinical Tests:   Lab Tests: Ordered and Reviewed  Radiological Study: Ordered and Reviewed  Medical Tests: Ordered and Reviewed           Scribe Attestation:   Scribe #1: I performed the above scribed service and the documentation accurately describes the services I performed. I attest to the accuracy of the note.    Attending:   Physician Attestation Statement for Scribe #1: I, Ewelina Kay MD, personally performed the services described in this documentation, as scribed by Johny Case, in my presence, and it is both accurate and complete.       Scribe Attestation:   Scribe #2: I performed the above scribed service and the documentation accurately describes the services I performed. I attest to the accuracy of the note.    Attending Attestation:           Physician Attestation for Scribe:    Physician Attestation Statement for Scribe #2: I, Marina Mtz MD, reviewed documentation, as scribed by Dorota Ramirez in my presence, and it is both accurate and complete. I also acknowledge and confirm the content of the note done by Armandibe #1.          Clinical Impression       ICD-10-CM ICD-9-CM   1. Symptomatic anemia  D64.9  285.9   2. Fall  W19.XXXA E888.9   3. Right shoulder pain  M25.511 719.41   4. Bilateral hip pain  M25.551 719.45    M25.552    5. Acute blood loss anemia  D62 285.1   6. Occult blood positive stool  R19.5 792.1   7. GI bleed  K92.2 578.9   8. Syncope  R55 780.2   9. Syncope and collapse  R55 780.2       Disposition:   Disposition: Placed in Observation  Condition: Stable         Marina Mtz MD  03/03/22 0551

## 2022-03-03 NOTE — ASSESSMENT & PLAN NOTE
Denies chest pain - EKG unrevealing  Continue home medications  Continuous cardiac monitoring  EKG prn

## 2022-03-03 NOTE — HOSPITAL COURSE
H/O PAF, CAD, combined CHF( EF 45%) on ASA/ Eliquis admitted for symptomatic anemia and melena. Hgb on admission was 7.3 with baseline 10 to 11. S/P 1 unit of P-RBC in the ED with repeat Hgb 8.3 this morning. Asa/Eliquis held and Pt started on IV PPI and GI consulted.     3/3- Monitor H/H. Continue PPI. Continue to hold ASA, Eliquis for now. GI consult obtained and plan for EGD/Colonoscopy tomorrow. BP meds were adjusted to BP trend. Monitor vitals.     3/4- NAEO. EGD and colonoscopy completed. No signs of GI bleeding. Will plan for outpatient video capsule to complete work-up. No further GI recommendations at this time. Expect stable for d/c in AM pending repeat CBC

## 2022-03-03 NOTE — ASSESSMENT & PLAN NOTE
Likely secondary to GI bleed  Stool for OCB positive  H&H at 7.3&24.8 in ED  Gastroenterology consulted  Type and screen sent  1 unit PRBCs given  Trend H&H - transfuse as indicated  Iron studies pending  Holding ASA, eliquis for now  NPO in case of AM testing/procedure

## 2022-03-03 NOTE — PATIENT INSTRUCTIONS
Understanding Iliotibial Band Syndrome  Iliotibial band syndrome, or IT band syndrome, is a condition that causes pain on the outside of the knee. It most often occurs in athletes, especially long-distance runners. It can happen if you cycle, ski, row, or play soccer. It can also occur in people who are starting to exercise.  What is the IT band?  The iliotibial (IT) band is a strong, thick band of tissue that runs down the outside of your thigh. It goes all the way from your hip bones to the top of your shinbone (tibia), just below your knee joint. The bones of your knee joint include your tibia, your thighbone (femur), and your kneecap (patella).  When you bend and straighten your leg, the IT band moves over the outer lower edge of your femur. Over time, bending and straightening your leg can cause the IT band to irritate nearby tissues and cause pain.  What causes IT band syndrome?  Researchers are still learning the exact cause of IT band syndrome. The pain may be caused by the IT band rubbing over the lower outer edge of the femur. This may cause inflammation in the bone, tendons, and small fluid-filled sacs (bursa) in the area. The IT band may also compress the tissue under it and cause pain.  If you are a runner, you might be more likely to develop IT band syndrome if:  · You run on uneven or downhill terrain  · You run on worn-out shoes  · You run many miles per day  · Your legs slope a little inward from your knee to your ankle (bowlegs)  Symptoms of IT band syndrome  IT band syndrome causes pain on the outside of the knee. It might affect one or both of your knees. The pain is an aching, burning feeling that can spread up the thigh to the hip. You may feel this pain only when you exercise, such as while running. The pain may be worst right after you step on your foot. It may only happen near the end of your exercise. As the condition gets worse, pain may start earlier and continue after you have stopped  Sivan from person directed support called to schedule PPD test to be completed prior to 6/10/22 exercising. Going up and down the stairs may make the pain worse.  Diagnosing IT band syndrome  Your doctor will ask about your health history and your symptoms. He or she will give you a physical exam. This will include an exam of your knee. The range of motion and strength of your knee will be tested. The doctor will look for areas of pain around your knee. The symptoms of IT band syndrome can be like those of osteoarthritis or a meniscal tear. Your doctor will need to make sure IT band syndrome is the cause of your symptoms. If the diagnosis is not clear, you may need imaging tests. These can include an X-ray or MRI scan.  Date Last Reviewed: 4/22/2015 © 2000-2016 [employer]. [address] [place] [address], [place] [address]. All rights reserved. This information is not intended as a substitute for professional medical care. Always follow your healthcare professional's instructions.        Treatment for Iliotibial Band Syndrome  Iliotibial band syndrome, or IT band syndrome, is a condition that causes pain on the outside of the knee. It most often occurs in athletes, especially long-distance runners. It can happen if you cycle, ski, row, or play soccer. It can also occur in people who are starting to exercise.  Types of treatment  Treatment may include:  · Avoiding any activity that makes your knee pain worse for a while (like running), and returning to this activity slowly over time  · Icing the outside of your knee when it causes you pain  · Taking over-the-counter pain medicines  · Having corticosteroid injections, to reduce inflammation  · Making changes to your activity, like lowering your bicycle seat for cycling or improving your running form  · Practicing special exercises to stretch and strengthen the muscles around your hip and your knee  You may find it helpful to work with a physical therapist.  These treatments help most people with IT band syndrome. Your doctor may advise surgery if you  still have severe symptoms after 6 months of other treatment. Your doctor will talk with you about the types of surgery.  Preventing IT band syndrome  You may be able to prevent IT band syndrome if you:  · Run on even surfaces  · Replace your running shoes often  · Ease up on your training  · On a track, make sure you run in both directions  · Have an expert check your stance for running and other sporting activities  · Stretch your outer thigh and hamstrings often  If you are new to exercise, start slowly. Increase your activity over time.  Talk with your doctor or  for more advice.  When to call the healthcare provider  Call your healthcare provider right away if you have any of these:  · Symptoms that get worse, or dont get better with treatment  · New symptoms   Date Last Reviewed: 8/10/2015  © 7518-9621 [employer]. [address] [place] [address], [place] [address]. All rights reserved. This information is not intended as a substitute for professional medical care. Always follow your healthcare professional's instructions.

## 2022-03-03 NOTE — ASSESSMENT & PLAN NOTE
Currently well-controlled  Home medications reviewed and resumed as appropriate   Hydralazine IV prn SBP > 170  VS per unit routine  Monitor closely

## 2022-03-03 NOTE — ASSESSMENT & PLAN NOTE
A1c at 6.7 on 12/02/2021 / BG in ED at 75  Accuchecks with SSI prn   Holding home jardiance, ozempic  Levemir at 1/2 home toujeo dose

## 2022-03-03 NOTE — CONSULTS
[facility])  Gastroenterology  Consult Note    Patient Name: Crow Green  MRN: [id number]  Admission Date: 3/2/2022  Hospital Length of Stay: 0 days  Code Status: Full Code   Attending Provider: Davina Donovan MD   Consulting Provider: Kenton Chavez PA-C  Primary Care Physician: Mateo Lambert DO  Principal Problem:Syncope and collapse    Inpatient consult to Gastroenterology  Consult performed by: Kenton Chavez PA-C  Consult ordered by: Corie Mendieta NP  Reason for consult: Anemia        Subjective:     HPI:  The patient presented to the ER after a fall. He reported his legs gave out. ER work up revealed a Hgb of 7.3, MCV 78, BUN 23, creatinine 1.7, , Troponin 0.113. He was transfused one unit of blood. Repeat Hgb 8.3. His baseline is around 11. Iron studies and ferritin drawn after transfusion. Ferritin low normal. Vitals stable. The patient reports having diarrhea for the last five days. He says it occurs every half hour, and he has had accidents while sleeping. He hasn't tried to take anything for it. He denies hematochezia but reports black stool. Stool studies ordered in the ER. C. Diff negative, other results pending. He denies abdominal pain, heartburn, change in appetite or nausea. He thinks he vomited clear liquid one day. He denies sick contacts, new meds, recent travel or antibiotic use. He denies NSAID use. He has never had an EGD or colonoscopy. He takes ASA and Eliquis at home.       Past Medical History:   Diagnosis Date    Arthritis     Asthma     Atrial fibrillation     Atrial fibrillation with RVR 8/7/2019    CHF (congestive heart failure)     Chronic obstructive pulmonary disease 8/5/2020    Depression     Dermatomyositis     Diabetes mellitus, type 2 Diagnosed in 2000    Elevated PSA     Follow up 7/30/2020    Hypertension     Myocardial infarction     2017    Sleep apnea        Past Surgical History:   Procedure Laterality Date     ABLATION OF ARRHYTHMOGENIC FOCUS FOR ATRIAL FIBRILLATION N/A 2/27/2020    Procedure: Ablation atrial fibrillation;  Surgeon: Gio Brown MD;  Location: [facility] EP LAB;  Service: Cardiology;  Laterality: N/A;  afib, DAMIEN (cx if SR), PVI, PITO, anes, MB, 3 Prep    CHOLECYSTECTOMY      CLOSURE OF WOUND Left 7/1/2020    Procedure: CLOSURE, WOUND;  Surgeon: Barb Veras DPM;  Location: [facility] OR;  Service: Podiatry;  Laterality: Left;    INJECTION OF ANESTHETIC AGENT INTO SACROILIAC JOINT Left 3/24/2021    Procedure: Left BLOCK, SACROILIAC JOINT and bilateral rhomboid TPI;  Surgeon: Dillan Tsai MD;  Location: [facility] PAIN MGT;  Service: Pain Management;  Laterality: Left;    REVERSE TOTAL SHOULDER ARTHROPLASTY Left 1/10/2022    Procedure: ARTHROPLASTY, SHOULDER, TOTAL, REVERSE;  Surgeon: Anthony Alvarado MD;  Location: [facility] OR;  Service: Orthopedics;  Laterality: Left;  left reverse total shoulder arthroplasty     SELECTIVE INJECTION OF ANESTHETIC AGENT AROUND LUMBAR SPINAL NERVE ROOT BY TRANSFORAMINAL APPROACH Left 6/11/2020    Procedure: BLOCK, SPINAL NERVE ROOT, LUMBAR, SELECTIVE, TRANSFORAMINAL APPROACH Left L4-5, L5-S1 TFESI with RN IV sedation;  Surgeon: Dillan Tsai MD;  Location: [facility] PAIN MGT;  Service: Pain Management;  Laterality: Left;    TOE AMPUTATION Left 6/29/2020    Procedure: AMPUTATION, TOE;  Surgeon: Barb Veras DPM;  Location: [facility] OR;  Service: Podiatry;  Laterality: Left;  great toe       Review of patient's allergies indicates:   Allergen Reactions    Protamine Hives     Urticaria, possible upper airway swelling     Family History       Problem Relation (Age of Onset)    Cancer Maternal Grandfather    Cataracts Mother, Father, Sister    Diabetes Mother, Sister, Maternal Aunt    Glaucoma Mother, Sister, Maternal Aunt    No Known Problems Brother, Daughter, Son    Stroke Father, Sister          Tobacco Use    Smoking status: Never Smoker    Smokeless tobacco: Never Used    Substance and Sexual Activity    Alcohol use: Yes     Comment: rare, maybe holidays    Drug use: Not Currently     Types: Cocaine    Sexual activity: Not Currently     Partners: Female     Review of Systems   Constitutional:  Negative for fever.   HENT:  Negative for hearing loss.    Eyes:  Negative for visual disturbance.   Respiratory:  Negative for cough and shortness of breath.    Cardiovascular:  Negative for chest pain and palpitations.   Gastrointestinal:         As per HPI.   Genitourinary:  Positive for difficulty urinating (reports frequent incontinence). Negative for dysuria and hematuria.   Musculoskeletal:  Positive for arthralgias and back pain.   Skin:  Negative for color change and rash.   Neurological:  Positive for weakness and headaches. Negative for seizures, syncope and numbness.   Hematological:  Does not bruise/bleed easily.   Psychiatric/Behavioral:  The patient is not nervous/anxious.    Objective:     Vital Signs (Most Recent):  Temp: 98.4 °F (36.9 °C) (03/03/22 0448)  Pulse: 80 (03/03/22 0500)  Resp: 18 (03/03/22 0448)  BP: 126/74 (03/03/22 0448)  SpO2: 100 % (03/03/22 0448) Vital Signs (24h Range):  Temp:  [98.4 °F (36.9 °C)-98.9 °F (37.2 °C)] 98.4 °F (36.9 °C)  Pulse:  [68-83] 80  Resp:  [18-20] 18  SpO2:  [92 %-100 %] 100 %  BP: (100-126)/(56-74) 126/74     Weight: 90.2 kg (198 lb 13.7 oz) (03/02/22 8609)  Body mass index is 33.09 kg/m².      Intake/Output Summary (Last 24 hours) at 3/3/2022 0855  Last data filed at 3/3/2022 0200  Gross per 24 hour   Intake 360.83 ml   Output --   Net 360.83 ml       Lines/Drains/Airways       Peripheral Intravenous Line  Duration                  Peripheral IV - Single Lumen 03/02/22 0000 20 G Left Antecubital 1 day                    Physical Exam  Constitutional:       General: He is not in acute distress.     Appearance: He is well-developed. He is not ill-appearing.   HENT:      Head: Normocephalic and atraumatic.   Eyes:      Extraocular  Movements: Extraocular movements intact.   Cardiovascular:      Rate and Rhythm: Normal rate and regular rhythm.      Heart sounds: Normal heart sounds. No murmur heard.  Pulmonary:      Effort: Pulmonary effort is normal. No respiratory distress.      Breath sounds: Normal breath sounds. No wheezing.   Abdominal:      General: Bowel sounds are normal. There is no distension.      Palpations: Abdomen is soft. There is no hepatomegaly or mass.      Tenderness: There is abdominal tenderness in the epigastric area. There is no guarding or rebound.   Musculoskeletal:      Cervical back: Normal range of motion and neck supple.      Right lower leg: No edema.      Left lower leg: No edema.   Skin:     General: Skin is warm and dry.      Findings: No rash.   Neurological:      Mental Status: He is oriented to person, place, and time and easily aroused. He is lethargic.      Cranial Nerves: No cranial nerve deficit.       Significant Labs:  CBC:   Recent Labs   Lab 03/02/22  1835 03/03/22  0503   WBC 4.96 5.76   HGB 7.3* 8.3*   HCT 24.8* 28.0*    208     CMP:   Recent Labs   Lab 03/03/22  0503   GLU 92   CALCIUM 7.8*   ALBUMIN 3.0*   PROT 6.8      K 4.1   CO2 27      BUN 27*   CREATININE 1.5*   ALKPHOS 72   ALT 14   AST 18   BILITOT 0.3     Coagulation: No results for input(s): PT, INR, APTT in the last 48 hours.    Significant Imaging:  Imaging results within the past 24 hours have been reviewed.    Assessment/Plan:     Symptomatic anemia  Patient responded to transfusion.   Will plan for EGD and colonoscopy tomorrow since source not clear and patient having diarrhea.   Continue to monitor H/H and transfuse additional units as needed.   Agree with IV Protonix.  Clear liquids today and NPO after midnight.     Diarrhea  C. Diff negative. Other stool studies pending.   Will get AXR.   Hold anti diarrhea meds as we will by trying to prep him.     PAF (paroxysmal atrial fibrillation)  ASA and Eliquis being  held.     Chronic combined systolic and diastolic congestive heart failure  Echo pending.         Thank you for your consult. I will follow-up with patient. Please contact us if you have any additional questions.    Kenton Chavez PA-C  Gastroenterology  [facility] ([facility])

## 2022-03-03 NOTE — H&P
[facility]  [facility] & Physical    Patient Name: Crow Green  MRN: [id number]  Patient Class: OP- Observation  Admission Date: 3/2/2022  Attending Physician: Dakota Francis MD   Primary Care Provider: Mateo Lambert DO         Patient information was obtained from patient, past medical records and ER records.     Subjective:     Principal Problem:Syncope and collapse    Chief Complaint:   Chief Complaint   Patient presents with    Fall     Ground level fall. Hit right side of head/ face. Also complaining of R hip and shoulder pain. 100mcg of fent given by AASI        HPI: 65 y/o AA M with hx of CAD, combined HF, DM2, pAfib, anemia, B hip/shoulder pain, HTN, SANDRITA on CPAP, asthma, gout, GERD, glaucoma, arthritis, CKD 3, HLD, back pain, depression, DOMINIK, COPD to ED after experiencing a ground-level fall immediately PTA - states he did not hit is head but felt his knees go out from under him - is not sure if he had LOC - currently reporting pain to the R shoulder, wrist and BLE. Also reporting diarrhea for the past 4 days consisting of black stool. Symptoms were episodic and of moderate severity, without known mitigating or exacerbating factors. Denies chest pain, edema, palpitations, SOB, wheezing, abdominal pain, N/V/D, hematochezia, constipation, dysuria, flank pain, HA, weakness, fever, cough, chills. Vital signs stable in ED - pt was afebrile; WBCs 4.96, H&H 7.03&24.8, platelets 229, Na 140, K+ 3.7, BUN 23, creatinine 1.7, GFR 48, , troponin 0.113; EKG showed sinus arrythmia with 1st degree bundle branch block; xrays showed no acute fracture or dislocation; Stool for c.diff negative, stool for OCB positive; rotavirus negative, COVID-19 negative; CT of the head showed no acute findings; Cxray showed improvement over previous. In ED the pt was given norco, immodium and 1L IV fluids - remained stable throughout without re-occurrence of symptoms. [facility] medicine  was called and the pt was placed in observation with telemetry monitoring. Pt is a full code - surrogate decision maker is his sister, Gretchen Green.       Past Medical History:   Diagnosis Date    Arthritis     Asthma     Atrial fibrillation     Atrial fibrillation with RVR 8/7/2019    CHF (congestive heart failure)     Chronic obstructive pulmonary disease 8/5/2020    Depression     Dermatomyositis     Diabetes mellitus, type 2 Diagnosed in 2000    Elevated PSA     Follow up 7/30/2020    Hypertension     Myocardial infarction     2017    Sleep apnea        Past Surgical History:   Procedure Laterality Date    ABLATION OF ARRHYTHMOGENIC FOCUS FOR ATRIAL FIBRILLATION N/A 2/27/2020    Procedure: Ablation atrial fibrillation;  Surgeon: Gio Brown MD;  Location[facility] EP LAB;  Service: Cardiology;  Laterality: N/A;  afib, DAMIEN (cx if SR), PVI, PITO, anes, MB, 3 Prep    CHOLECYSTECTOMY      CLOSURE OF WOUND Left 7/1/2020    Procedure: CLOSURE, WOUND;  Surgeon: Barb Veras DPM;  Location: [facility] OR;  Service: Podiatry;  Laterality: Left;    INJECTION OF ANESTHETIC AGENT INTO SACROILIAC JOINT Left 3/24/2021    Procedure: Left BLOCK, SACROILIAC JOINT and bilateral rhomboid TPI;  Surgeon: Dillan Tsai MD;  Location: [facility] PAIN MGT;  Service: Pain Management;  Laterality: Left;    REVERSE TOTAL SHOULDER ARTHROPLASTY Left 1/10/2022    Procedure: ARTHROPLASTY, SHOULDER, TOTAL, REVERSE;  Surgeon: Anthony Alvarado MD;  Location: [facility] OR;  Service: Orthopedics;  Laterality: Left;  left reverse total shoulder arthroplasty     SELECTIVE INJECTION OF ANESTHETIC AGENT AROUND LUMBAR SPINAL NERVE ROOT BY TRANSFORAMINAL APPROACH Left 6/11/2020    Procedure: BLOCK, SPINAL NERVE ROOT, LUMBAR, SELECTIVE, TRANSFORAMINAL APPROACH Left L4-5, L5-S1 TFESI with RN IV sedation;  Surgeon: Dillan Tsai MD;  Location: [facility] PAIN MGT;  Service: Pain Management;  Laterality: Left;    TOE AMPUTATION Left  6/29/2020    Procedure: AMPUTATION, TOE;  Surgeon: Barb Veras DPM;  Location: [facility];  Service: Podiatry;  Laterality: Left;  great toe       Review of patient's allergies indicates:   Allergen Reactions    Protamine Hives     Urticaria, possible upper airway swelling       No current facility-administered medications on file prior to encounter.     Current Outpatient Medications on File Prior to Encounter   Medication Sig    aspirin (ECOTRIN) 81 MG EC tablet Take 1 tablet (81 mg total) by mouth once daily.    acetaminophen (TYLENOL) 500 MG tablet Take 2 tablets (1,000 mg total) by mouth every 8 (eight) hours as needed for Pain.    albuterol (PROVENTIL) 2.5 mg /3 mL (0.083 %) nebulizer solution Take 3 mLs (2.5 mg total) by nebulization every 4 to 6 hours as needed for Wheezing or Shortness of Breath.    allopurinoL (ZYLOPRIM) 100 MG tablet Take 1 tablet (100 mg total) by mouth once daily.    amLODIPine (NORVASC) 2.5 MG tablet Take 1 tablet (2.5 mg total) by mouth every evening.    atorvastatin (LIPITOR) 40 MG tablet Take 1 tablet (40 mg total) by mouth every evening.    azaTHIOprine (IMURAN) 50 mg Tab Take 1 tablet (50 mg total) by mouth once daily.    baclofen (LIORESAL) 10 MG tablet TAKE 1 TABLET EVERY DAY    blood sugar diagnostic Strp 1 each by Misc.(Non-Drug; Combo Route) route 4 (four) times daily.    blood-glucose meter (TRUE METRIX GLUCOSE METER) kit Use as instructed to test blood glucose meter daily.    colchicine (COLCRYS) 0.6 mg tablet TAKE 1 TABLET (0.6 MG TOTAL) BY MOUTH ONCE DAILY.    DEPO-MEDROL 40 mg/mL injection     diclofenac sodium (VOLTAREN) 1 % Gel APPLY 2 GRAMS TOPICALLY FOUR TIMES DAILY    ELIQUIS 5 mg Tab TAKE 1 TABLET TWICE DAILY    empagliflozin (JARDIANCE) 25 mg tablet Take 1 tablet (25 mg total) by mouth once daily.    famotidine (PEPCID) 20 MG tablet Take 1 tablet (20 mg total) by mouth 2 (two) times daily.    fentaNYL (SUBLIMAZE) 50 mcg/mL injection      fluticasone-salmeterol diskus inhaler 250-50 mcg Inhale 1 puff into the lungs 2 (two) times daily. Controller    food supplemt, lactose-reduced (ENSURE) Liqd Take 118 mLs by mouth 3 (three) times daily with meals.    furosemide (LASIX) 40 MG tablet Take 1 tablet (40 mg total) by mouth once daily. TAKE 2 TABLETS EVERY MORNING  AND TAKE 1 TABLET EVERY EVENING (Patient taking differently: TAKE 2 TABLETS EVERY MORNING  AND TAKE 1 TABLET EVERY EVENING)    gabapentin (NEURONTIN) 800 MG tablet Take 1 tablet (800 mg total) by mouth 3 (three) times daily.    heparin sodium,porcine/D5W (HEPARIN, PORCINE, IN 5 % DEX) 25,000 unit/250 mL(100 unit/mL) infusion     HYDROcodone-acetaminophen (NORCO) 5-325 mg per tablet Take 1 tablet by mouth every 6 (six) hours as needed for Pain.    HYDROcodone-acetaminophen (NORCO) 5-325 mg per tablet Take 1 tablet by mouth every 6 (six) hours as needed for Pain.    insulin aspart U-100 (NOVOLOG FLEXPEN U-100 INSULIN) 100 unit/mL (3 mL) InPn pen INJECT 10 UNITS SUBCUTANEOUSLY THREE TIMES DAILY WITH MEALS    insulin degludec (TRESIBA FLEXTOUCH U-200) 200 unit/mL (3 mL) insulin pen Inject 40 Units into the skin once daily. (Patient taking differently: Inject 38 Units into the skin once daily.)    ipratropium (ATROVENT) 0.02 % nebulizer solution INHALE THE CONTENTS OF 1 VIAL VIA NEBULIZER FOUR TIMES DAILY    isosorbide mononitrate (IMDUR) 60 MG 24 hr tablet Take 1 tablet (60 mg total) by mouth once daily.    lancets Misc 1 each by Misc.(Non-Drug; Combo Route) route 4 (four) times daily.    latanoprost 0.005 % ophthalmic solution INSTILL 1 DROP INTO BOTH EYES ONE TIME DAILY    lisinopriL (PRINIVIL,ZESTRIL) 30 MG tablet Take 1 tablet by mouth once daily.    magnesium oxide (MAG-OX) 400 mg (241.3 mg magnesium) tablet Take 1 tablet (400 mg total) by mouth once daily.    metoprolol succinate (TOPROL-XL) 50 MG 24 hr tablet Take 1 tablet (50 mg total) by mouth every evening.    midazolam  (VERSED) 1 mg/mL injection     naproxen (EC NAPROSYN) 500 MG EC tablet TAKE 1 TABLET(500 MG) BY MOUTH TWICE DAILY WITH MEALS    nitroGLYCERIN (NITROSTAT) 0.4 MG SL tablet Place 1 tablet (0.4 mg total) under the tongue every 5 (five) minutes as needed for Chest pain.    OMNIPAQUE 240 240 mg iodine/mL injection     OZEMPIC 1 mg/dose (2 mg/1.5 mL) PnIj Inject 0.75 mLs (1 mg total) into the skin once a week.    OZEMPIC 1 mg/dose (4 mg/3 mL) INJECT 1MG SUBCUTANEOUSLY ONE TIME WEEKLY    pantoprazole (PROTONIX) 40 MG tablet Take 1 tablet (40 mg total) by mouth once daily.    ranolazine (RANEXA) 500 MG Tb12 Take 1 tablet (500 mg total) by mouth 2 (two) times daily.    sertraline (ZOLOFT) 100 MG tablet Take 1 tablet (100 mg total) by mouth every evening.    tacrolimus (PROTOPIC) 0.1 % ointment Apply topically 2 (two) times daily.    tamsulosin (FLOMAX) 0.4 mg Cap Take 1 capsule (0.4 mg total) by mouth once daily.    tiotropium (SPIRIVA WITH HANDIHALER) 18 mcg inhalation capsule INHALE THE CONTENTS OF 1 CAPSULE ONE TIME DAILY (CONTROLLER)    traMADoL (ULTRAM) 50 mg tablet Take 1 tablet (50 mg total) by mouth every 4 (four) hours as needed for Pain.    traZODone (DESYREL) 100 MG tablet Take 2 tablets (200 mg total) by mouth every evening.    triamcinolone acetonide 0.1% (KENALOG) 0.1 % ointment Apply topically 2 (two) times daily. Use on legs and back    VIOS AEROSOL DELIVERY SYSTEM Shefali USE AS DIRESTED     Family History       Problem Relation (Age of Onset)    Cancer Maternal Grandfather    Cataracts Mother, Father, Sister    Diabetes Mother, Sister, Maternal Aunt    Glaucoma Mother, Sister, Maternal Aunt    No Known Problems Brother, Daughter, Son    Stroke Father, Sister          Tobacco Use    Smoking status: Never Smoker    Smokeless tobacco: Never Used   Substance and Sexual Activity    Alcohol use: Yes     Comment: rare, maybe holidays    Drug use: Not Currently     Types: Cocaine    Sexual  "activity: Not Currently     Partners: Female     Review of Systems   Constitutional:  Negative for activity change, chills, diaphoresis, fatigue and fever.   HENT:  Negative for congestion, trouble swallowing and voice change.    Eyes:  Negative for photophobia and discharge.   Respiratory:  Negative for cough, chest tightness, shortness of breath and wheezing.    Cardiovascular:  Negative for chest pain, palpitations and leg swelling.   Gastrointestinal:  Positive for diarrhea ("black"). Negative for abdominal pain, blood in stool, constipation, nausea and vomiting.   Endocrine: Negative for cold intolerance and heat intolerance.   Genitourinary:  Negative for difficulty urinating, dysuria, flank pain, frequency and urgency.   Musculoskeletal:  Positive for arthralgias (R shoulder, wrist, BLE). Negative for back pain, joint swelling and myalgias.   Skin:  Negative for rash and wound.   Neurological:  Positive for dizziness and syncope. Negative for seizures, facial asymmetry, weakness, light-headedness, numbness and headaches.   Psychiatric/Behavioral:  Negative for confusion and hallucinations.    Objective:     Vital Signs (Most Recent):  Temp: 98.6 °F (37 °C) (03/03/22 0230)  Pulse: 68 (03/03/22 0230)  Resp: 18 (03/03/22 0230)  BP: 122/63 (03/03/22 0230)  SpO2: 98 % (03/03/22 0230) Vital Signs (24h Range):  Temp:  [98.5 °F (36.9 °C)-98.9 °F (37.2 °C)] 98.6 °F (37 °C)  Pulse:  [68-83] 68  Resp:  [18-20] 18  SpO2:  [92 %-100 %] 98 %  BP: (100-126)/(56-67) 122/63     Weight: 90.2 kg (198 lb 13.7 oz)  Body mass index is 33.09 kg/m².    Physical Exam  Vitals reviewed.   Constitutional:       General: He is not in acute distress.     Appearance: Normal appearance. He is normal weight. He is ill-appearing. He is not toxic-appearing.   HENT:      Head: Normocephalic and atraumatic.      Nose: Nose normal. No congestion.      Mouth/Throat:      Mouth: Mucous membranes are moist.   Eyes:      Pupils: Pupils are equal, " round, and reactive to light.   Cardiovascular:      Rate and Rhythm: Normal rate and regular rhythm.      Pulses: Normal pulses.      Heart sounds: Normal heart sounds.   Pulmonary:      Effort: Pulmonary effort is normal. No respiratory distress.      Breath sounds: Normal breath sounds. No wheezing or rhonchi.   Abdominal:      General: Abdomen is flat. Bowel sounds are normal. There is no distension.      Palpations: Abdomen is soft.      Tenderness: There is no abdominal tenderness. There is no rebound.   Musculoskeletal:         General: Deformity (L 1st toe amputation) present. Normal range of motion.      Cervical back: Normal range of motion and neck supple. No tenderness.   Skin:     General: Skin is warm and dry.      Capillary Refill: Capillary refill takes less than 2 seconds.      Findings: No bruising or erythema.   Neurological:      General: No focal deficit present.      Mental Status: He is alert and oriented to person, place, and time.      Sensory: No sensory deficit.      Motor: No weakness.   Psychiatric:         Mood and Affect: Mood normal.         Behavior: Behavior normal.         Thought Content: Thought content normal.         Judgment: Judgment normal.          Significant Labs:   Results for orders placed or performed during the hospital encounter of 03/02/22   Clostridium difficile EIA    Specimen: Stool   Result Value Ref Range    C. diff Antigen Negative Negative    C difficile Toxins A+B, EIA Negative Negative   CBC auto differential   Result Value Ref Range    WBC 4.96 3.90 - 12.70 K/uL    RBC 3.18 (L) 4.60 - 6.20 M/uL    Hemoglobin 7.3 (L) 14.0 - 18.0 g/dL    Hematocrit 24.8 (L) 40.0 - 54.0 %    MCV 78 (L) 82 - 98 fL    MCH 23.0 (L) 27.0 - 31.0 pg    MCHC 29.4 (L) 32.0 - 36.0 g/dL    RDW 20.1 (H) 11.5 - 14.5 %    Platelets 229 150 - 450 K/uL    MPV 10.2 9.2 - 12.9 fL    Immature Granulocytes 0.2 0.0 - 0.5 %    Gran # (ANC) 2.4 1.8 - 7.7 K/uL    Immature Grans (Abs) 0.01 0.00 -  0.04 K/uL    Lymph # 1.6 1.0 - 4.8 K/uL    Mono # 0.8 0.3 - 1.0 K/uL    Eos # 0.2 0.0 - 0.5 K/uL    Baso # 0.01 0.00 - 0.20 K/uL    nRBC 0 0 /100 WBC    Gran % 47.8 38.0 - 73.0 %    Lymph % 31.5 18.0 - 48.0 %    Mono % 16.1 (H) 4.0 - 15.0 %    Eosinophil % 4.2 0.0 - 8.0 %    Basophil % 0.2 0.0 - 1.9 %    Differential Method Automated    Comprehensive metabolic panel   Result Value Ref Range    Sodium 140 136 - 145 mmol/L    Potassium 3.7 3.5 - 5.1 mmol/L    Chloride 101 95 - 110 mmol/L    CO2 27 23 - 29 mmol/L    Glucose 75 70 - 110 mg/dL    BUN 23 8 - 23 mg/dL    Creatinine 1.7 (H) 0.5 - 1.4 mg/dL    Calcium 8.5 (L) 8.7 - 10.5 mg/dL    Total Protein 6.8 6.0 - 8.4 g/dL    Albumin 3.1 (L) 3.5 - 5.2 g/dL    Total Bilirubin 0.2 0.1 - 1.0 mg/dL    Alkaline Phosphatase 68 55 - 135 U/L    AST 19 10 - 40 U/L    ALT 11 10 - 44 U/L    Anion Gap 12 8 - 16 mmol/L    eGFR if African American 48 (A) >60 mL/min/1.73 m^2    eGFR if non African American 42 (A) >60 mL/min/1.73 m^2   Troponin I #1   Result Value Ref Range    Troponin I 0.123 (H) 0.000 - 0.026 ng/mL   BNP   Result Value Ref Range     (H) 0 - 99 pg/mL   Troponin I   Result Value Ref Range    Troponin I 0.113 (H) 0.000 - 0.026 ng/mL   Occult blood x 1, stool    Specimen: Stool   Result Value Ref Range    Occult Blood Positive (A) Negative   Rotavirus antigen, stool   Result Value Ref Range    Rotavirus Negative Negative   POCT COVID-19 Rapid Screening   Result Value Ref Range    POC Rapid COVID Negative Negative     Acceptable Yes    Type & Screen   Result Value Ref Range    Group & Rh A POS     Indirect Berry NEG    Prepare RBC 1 Unit   Result Value Ref Range    UNIT NUMBER [id number]     Product Code [id number]     DISPENSE STATUS TRANSFUSED     CODING SYSTEM [id number]     Unit Blood Type Code 6200     Unit Blood Type A POS     Unit Expiration [id number]      *Note: Due to a large number of results and/or encounters for the requested time  period, some results have not been displayed. A complete set of results can be found in Results Review.         Significant Imaging:   Imaging Results              X-Ray Hips Bilateral 2 View Incl AP Pelvis (Final result)  Result time 03/02/22 19:39:49      Final result by Denise Cain MD (03/02/22 19:39:49)                   Impression:      No acute fracture or dislocation.  If symptoms persist, recommend non emergent MRI for further evaluation.      Electronically signed by: Ant Walker  Date:    03/02/2022  Time:    19:39               Narrative:    EXAMINATION:  XR HIPS BILATERAL 2 VIEW INCL AP PELVIS    CLINICAL HISTORY:  Pain in right hip    TECHNIQUE:  AP view of the pelvis and frogleg lateral views of both hips were performed.    COMPARISON:  None.    FINDINGS:  No acute fracture or dislocation.  Large bony prominence involving the superior aspect of the acetabulum above the hip joint may be a source of impingement.  Tubular calcification in the pelvis.  Vascular calcification also noted.  Mild degenerative joint disease.                                       X-Ray Chest AP Portable (Final result)  Result time 03/02/22 19:37:31      Final result by Denise Cain MD (03/02/22 19:37:31)                   Impression:      Improvement in basilar aeration.  Persistent cardiomegaly.      Electronically signed by: Ant Walker  Date:    03/02/2022  Time:    19:37               Narrative:    EXAMINATION:  XR CHEST AP PORTABLE    CLINICAL HISTORY:  fall;    TECHNIQUE:  Single frontal view of the chest was performed.    COMPARISON:  Prior    FINDINGS:  Left shoulder prosthesis.  Mild cardiomegaly.  Improvement in basilar interstitial opacities compared to prior exam.    Bones are intact.                                       X-Ray Shoulder Trauma Right (Final result)  Result time 03/02/22 19:36:13      Final result by Denise Cain MD (03/02/22 19:36:13)                   Impression:      As  above      Electronically signed by: Ant Walker  Date:    03/02/2022  Time:    19:36               Narrative:    EXAMINATION:  XR SHOULDER TRAUMA 3 VIEW RIGHT    CLINICAL HISTORY:  Pain in right shoulder    TECHNIQUE:  Three or four views of the right shoulder were performed.    COMPARISON:  None    FINDINGS:  AC joint is widened.  Correlate to point tenderness.  Degenerative joint disease of glenohumeral joint.  No acute fracture or dislocation.                                       CT Head Without Contrast (Final result)  Result time 03/02/22 19:15:25      Final result by Jose Mishra MD (03/02/22 19:15:25)                   Impression:      No acute intracranial findings.    All CT scans at this facility are performed  using dose modulation techniques as appropriate to performed exam including the following:  automated exposure control; adjustment of mA and/or kV according to the patients size (this includes techniques or standardized protocols for targeted exams where dose is matched to indication/reason for exam: i.e. extremities or head);  iterative reconstruction technique.      Electronically signed by: Jose Mishra  Date:    03/02/2022  Time:    19:15               Narrative:    EXAMINATION:  CT HEAD WITHOUT CONTRAST    CLINICAL HISTORY:  Head trauma, moderate-severe;    FINDINGS:  No intracranial hemorrhage or acute findings are demonstrated.  Low-density changes in the periventricular white matter consistent with small vessel ischemia.  The visualized paranasal sinuses are clear. The calvarium is intact.  There is some metallic opacities in the soft tissues of the left temporal region.  These were present on 05/09/2018.                                          Results for orders placed or performed during the hospital encounter of 02/25/22   EKG 12-lead    Collection Time: 02/25/22  9:12 AM    Narrative    Test Reason : I10    Vent. Rate : 077 BPM     Atrial Rate : 077 BPM     P-R Int : 254 ms           QRS Dur : 110 ms      QT Int : 418 ms       P-R-T Axes : 020 -60 122 degrees     QTc Int : 473 ms    Sinus rhythm with 1st degree A-V block  Incomplete right bundle branch block  Left anterior fascicular block  Septal infarct ,age undetermined  ST and T wave abnormality, consider lateral ischemia  Abnormal ECG  When compared with ECG of 03-AUG-2021 08:50,  Premature supraventricular complexes are no longer Present  DE interval has increased  Septal infarct is now Present  Confirmed by RASHI NOBLE MD ([id number]) on 2/26/2022 8:27:37 AM    Referred By: MERA NUNEZ           Confirmed By:RASHI NOBLE MD                      The EKG was ordered, reviewed, and independently interpreted by the ED provider.  Interpretation time: 18:31  Rate: 72 BPM  Rhythm: Sinus rhythm with marked sinus arrhythmia with 1st degree AV block  Interpretation: Left anterior fascicular block. LVH. Septal infarct, age undetermined. No STEMI.  When compared to EKG performed on 2/25/22, there are no significant changes.                 Assessment/Plan:     * Syncope and collapse  Likely sequelae of anemia  Orthostatic VS as ordered  VS per unit routine  Fall precautions  ECHO pending  B carotid doppler u/s pending  TSH pending   Continuous cardiac monitoring      Symptomatic anemia  Likely secondary to GI bleed  Stool for OCB positive  H&H at 7.3&24.8 in ED  Gastroenterology consulted  Type and screen sent  1 unit PRBCs given  Trend H&H - transfuse as indicated  Iron studies pending  Holding ASA, eliquis for now  NPO in case of AM testing/procedure      GI bleed  Stool for OCB positive in ED  Gastroenterology consulted  1 unit PRBCs transfused  Trend H&H, transfuse as indicated  Protonix 40mg IV BID  NPO in case of AM testing/procedure  Holding eliquis, ASA for now - resume as appropriate    Essential hypertension  Currently well-controlled  Home medications resumed  Hydralazine IV prn SBP > 170  VS per unit routine  Monitor closely        Chronic  obstructive pulmonary disease  No evidence of acute exacerbation  Supplemental O2 prn  Duonebs scheduled and prn  Pulmicort nebs BID      Chronic combined systolic and diastolic congestive heart failure  Echo - 05/25/2021 - EF 40%, grade 1 diastolic dysfunction  Appears clinically compensated  Continue home medications  Cardiac diet  Strict I&Os  Daily weights  ECHO pending    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, with long-term current use of insulin  A1c at 6.7 on 12/02/2021 / BG in ED at 75  Accuchecks with SSI prn   Holding home jardiance, ozempic  Levemir at 1/2 home toujeo dose        Nonobstructive coronary artery disease of native artery of native heart  Denies chest pain - EKG unrevealing  Continue home medications  Continuous cardiac monitoring  EKG prn      PAF (paroxysmal atrial fibrillation)  Currently sinus arrythmia - rate controlled  Continue home medications as appropriate  Holding home eliquis/ASA for now d/t GI bleed - resume as appropriate  Continuous cardiac monitoring    Moderate asthma  No evidence of acute exacerbation  Supplemental O2 prn  Duonebs scheduled and prn    SANDRITA on CPAP  CPAP qHS at home settings  RT to adjust as needed      Hyperlipidemia associated with type 2 diabetes mellitus  Continue home statin      Elevated troponin  Denies chest pain - EKG unrevealing  Troponin at 0.113 in ED - stable at baseline  Continue home meds as appropriate  Continuous cardiac monitoring  ECHO pending        VTE Risk Mitigation (From admission, onward)         Ordered     Place sequential compression device  Until discontinued         03/03/22 0235                   Corie Mendieta NP  Department of Hospital Medicine   [facility] - Telemetry ([facility])

## 2022-03-03 NOTE — ASSESSMENT & PLAN NOTE
C. Diff negative. Other stool studies pending.   Will get AXR.   Hold anti diarrhea meds as we will by trying to prep him.

## 2022-03-03 NOTE — ASSESSMENT & PLAN NOTE
Likely sequelae of anemia  Orthostatic VS as ordered  VS per unit routine  Fall precautions  ECHO ordered   B carotid doppler u/s ordered   Continuous cardiac monitoring

## 2022-03-03 NOTE — ASSESSMENT & PLAN NOTE
Stool for OCB positive in ED  Gastroenterology consulted  1 unit PRBCs transfused  Trend H&H, transfuse as indicated  Protonix 40mg IV BID  NPO in case of AM testing/procedure  Holding eliquis, ASA for now - resume as appropriate  3/3-  GI consult obtained and plan for EGD/Colonoscopy tomorrow  NPO after midnight

## 2022-03-03 NOTE — ASSESSMENT & PLAN NOTE
A1c at 6.7 on 12/02/2021 / BG in ED at 75  Accuchecks with SSI prn   Holding home jardiance, ozempic  Hold Levemir while NPO

## 2022-03-03 NOTE — ASSESSMENT & PLAN NOTE
Currently sinus arrythmia - rate controlled  Continue home medications as appropriate  Holding home eliquis/ASA for now d/t GI bleed - resume as appropriate  Continuous cardiac monitoring

## 2022-03-03 NOTE — SUBJECTIVE & OBJECTIVE
Past Medical History:   Diagnosis Date    Arthritis     Asthma     Atrial fibrillation     Atrial fibrillation with RVR 8/7/2019    CHF (congestive heart failure)     Chronic obstructive pulmonary disease 8/5/2020    Depression     Dermatomyositis     Diabetes mellitus, type 2 Diagnosed in 2000    Elevated PSA     Follow up 7/30/2020    Hypertension     Myocardial infarction     2017    Sleep apnea        Past Surgical History:   Procedure Laterality Date    ABLATION OF ARRHYTHMOGENIC FOCUS FOR ATRIAL FIBRILLATION N/A 2/27/2020    Procedure: Ablation atrial fibrillation;  Surgeon: Gio Brown MD;  Location: [facility] EP LAB;  Service: Cardiology;  Laterality: N/A;  afib, DAMIEN (cx if SR), PVI, PITO, anes, MB, 3 Prep    CHOLECYSTECTOMY      CLOSURE OF WOUND Left 7/1/2020    Procedure: CLOSURE, WOUND;  Surgeon: Barb Veras DPM;  Location: [facility] OR;  Service: Podiatry;  Laterality: Left;    INJECTION OF ANESTHETIC AGENT INTO SACROILIAC JOINT Left 3/24/2021    Procedure: Left BLOCK, SACROILIAC JOINT and bilateral rhomboid TPI;  Surgeon: Dillan Tsai MD;  Location: [facility] PAIN MGT;  Service: Pain Management;  Laterality: Left;    REVERSE TOTAL SHOULDER ARTHROPLASTY Left 1/10/2022    Procedure: ARTHROPLASTY, SHOULDER, TOTAL, REVERSE;  Surgeon: Anthony Alvarado MD;  Location: [facility] OR;  Service: Orthopedics;  Laterality: Left;  left reverse total shoulder arthroplasty     SELECTIVE INJECTION OF ANESTHETIC AGENT AROUND LUMBAR SPINAL NERVE ROOT BY TRANSFORAMINAL APPROACH Left 6/11/2020    Procedure: BLOCK, SPINAL NERVE ROOT, LUMBAR, SELECTIVE, TRANSFORAMINAL APPROACH Left L4-5, L5-S1 TFESI with RN IV sedation;  Surgeon: Dillan Tsai MD;  Location: [facility] PAIN MGT;  Service: Pain Management;  Laterality: Left;    TOE AMPUTATION Left 6/29/2020    Procedure: AMPUTATION, TOE;  Surgeon: Barb Veras DPM;  Location: [facility] OR;  Service: Podiatry;  Laterality: Left;  great toe       Review of patient's allergies indicates:    Allergen Reactions    Protamine Hives     Urticaria, possible upper airway swelling     Family History       Problem Relation (Age of Onset)    Cancer Maternal Grandfather    Cataracts Mother, Father, Sister    Diabetes Mother, Sister, Maternal Aunt    Glaucoma Mother, Sister, Maternal Aunt    No Known Problems Brother, Daughter, Son    Stroke Father, Sister          Tobacco Use    Smoking status: Never Smoker    Smokeless tobacco: Never Used   Substance and Sexual Activity    Alcohol use: Yes     Comment: rare, maybe holidays    Drug use: Not Currently     Types: Cocaine    Sexual activity: Not Currently     Partners: Female     Review of Systems   Constitutional:  Negative for fever.   HENT:  Negative for hearing loss.    Eyes:  Negative for visual disturbance.   Respiratory:  Negative for cough and shortness of breath.    Cardiovascular:  Negative for chest pain and palpitations.   Gastrointestinal:         As per HPI.   Genitourinary:  Positive for difficulty urinating (reports frequent incontinence). Negative for dysuria and hematuria.   Musculoskeletal:  Positive for arthralgias and back pain.   Skin:  Negative for color change and rash.   Neurological:  Positive for weakness and headaches. Negative for seizures, syncope and numbness.   Hematological:  Does not bruise/bleed easily.   Psychiatric/Behavioral:  The patient is not nervous/anxious.    Objective:     Vital Signs (Most Recent):  Temp: 98.4 °F (36.9 °C) (03/03/22 0448)  Pulse: 80 (03/03/22 0500)  Resp: 18 (03/03/22 0448)  BP: 126/74 (03/03/22 0448)  SpO2: 100 % (03/03/22 0448) Vital Signs (24h Range):  Temp:  [98.4 °F (36.9 °C)-98.9 °F (37.2 °C)] 98.4 °F (36.9 °C)  Pulse:  [68-83] 80  Resp:  [18-20] 18  SpO2:  [92 %-100 %] 100 %  BP: (100-126)/(56-74) 126/74     Weight: 90.2 kg (198 lb 13.7 oz) (03/02/22 2359)  Body mass index is 33.09 kg/m².      Intake/Output Summary (Last 24 hours) at 3/3/2022 0865  Last data filed at 3/3/2022 0200  Gross per 24  hour   Intake 360.83 ml   Output --   Net 360.83 ml       Lines/Drains/Airways       Peripheral Intravenous Line  Duration                  Peripheral IV - Single Lumen 03/02/22 0000 20 G Left Antecubital 1 day                    Physical Exam  Constitutional:       General: He is not in acute distress.     Appearance: He is well-developed. He is not ill-appearing.   HENT:      Head: Normocephalic and atraumatic.   Eyes:      Extraocular Movements: Extraocular movements intact.   Cardiovascular:      Rate and Rhythm: Normal rate and regular rhythm.      Heart sounds: Normal heart sounds. No murmur heard.  Pulmonary:      Effort: Pulmonary effort is normal. No respiratory distress.      Breath sounds: Normal breath sounds. No wheezing.   Abdominal:      General: Bowel sounds are normal. There is no distension.      Palpations: Abdomen is soft. There is no hepatomegaly or mass.      Tenderness: There is abdominal tenderness in the epigastric area. There is no guarding or rebound.   Musculoskeletal:      Cervical back: Normal range of motion and neck supple.      Right lower leg: No edema.      Left lower leg: No edema.   Skin:     General: Skin is warm and dry.      Findings: No rash.   Neurological:      Mental Status: He is oriented to person, place, and time and easily aroused. He is lethargic.      Cranial Nerves: No cranial nerve deficit.       Significant Labs:  CBC:   Recent Labs   Lab 03/02/22  1835 03/03/22  0503   WBC 4.96 5.76   HGB 7.3* 8.3*   HCT 24.8* 28.0*    208     CMP:   Recent Labs   Lab 03/03/22  0503   GLU 92   CALCIUM 7.8*   ALBUMIN 3.0*   PROT 6.8      K 4.1   CO2 27      BUN 27*   CREATININE 1.5*   ALKPHOS 72   ALT 14   AST 18   BILITOT 0.3     Coagulation: No results for input(s): PT, INR, APTT in the last 48 hours.    Significant Imaging:  Imaging results within the past 24 hours have been reviewed.

## 2022-03-03 NOTE — ASSESSMENT & PLAN NOTE
Stool for OCB positive in ED  Gastroenterology consulted  1 unit PRBCs transfused  Trend H&H, transfuse as indicated  Protonix 40mg IV BID  NPO in case of AM testing/procedure  Holding eliquis, ASA for now - resume as appropriate

## 2022-03-03 NOTE — ASSESSMENT & PLAN NOTE
Denies chest pain - EKG unrevealing  Troponin at 0.113 in ED - stable at baseline  Continue home meds as appropriate  Continuous cardiac monitoring  ECHO pending

## 2022-03-03 NOTE — ASSESSMENT & PLAN NOTE
Patient responded to transfusion.   Will plan for EGD and colonoscopy tomorrow since source not clear and patient having diarrhea.   Continue to monitor H/H and transfuse additional units as needed.   Clear liquids today and NPO after midnight.

## 2022-03-03 NOTE — ASSESSMENT & PLAN NOTE
Denies chest pain - EKG unrevealing  Troponin at 0.113 in ED - stable at baseline  Continue home meds as appropriate  Continuous cardiac monitoring  ECHO ordered on admit

## 2022-03-03 NOTE — ASSESSMENT & PLAN NOTE
Likely secondary to GI bleed  Stool for OCB positive  H&H at 7.3&24.8 in ED with baseline 10 to 11  Gastroenterology consulted  Type and screen sent  1 unit PRBCs given  Trend H&H - transfuse as indicated  Iron studies pending  Ferritin 24 suggestive of depleted iron store   Holding ASA, eliquis for now  NPO after midnight

## 2022-03-03 NOTE — PLAN OF CARE
Pt arrived to unit via stretcher.  Admitted from ED for fall.    Cardiac monitor # [id number] applied & verified w/ monitor room.   Educated on using call light; purpose of white board; importance of monitoring I/Os; pharmacy bedside delivery.   Aox4.  Able to verbalize needs & follow commands. Calm & cooperative throughout shift.   POC reviewed with pt. Interventions implemented as appropriate.    VSS; SR w/ first degree AV block on tele-monitor.  On 2L NC. Order placed for CPAP qhs.    PIV patent.    Received one unit PRBCs. No adverse reactions noted.  Skin WDI.  Continuous blood glucose monitor on R-side.  No c/o pain.    Has episodes of b/b incontinence. Frequent episodes of diarrhea in ED. None since arrival to unit.   Absorbant pad changed as needed. Urinal w/in reach. Up to bsc independently.    POCT glucose q6h.  SCDs ordered for VTE. Pt refused d/t immobility w/ them on.  Ambulates w/ cane & walker at home. One person assist. Activity ad neftaly.   Frequent position changes encouraged.    NADN. Resting quietly in bed.   Free of falls. Hourly rounding complete.   All safety measures remain in place. SR up x2; bed low & locked. Bed alarm on & audible. Call light w/in reach.   Will continue to monitor throughout shift.

## 2022-03-03 NOTE — HPI
67 y/o AA M with hx of CAD, combined HF, DM2, pAfib, anemia, B hip/shoulder pain, HTN, SANDRITA on CPAP, asthma, gout, GERD, glaucoma, arthritis, CKD 3, HLD, back pain, depression, DOIMNIK, COPD to ED after experiencing a ground-level fall immediately PTA - states he did not hit is head but felt his knees go out from under him - is not sure if he had LOC - currently reporting pain to the R shoulder, wrist and BLE. Also reporting diarrhea for the past 4 days consisting of black stool. Symptoms were episodic and of moderate severity, without known mitigating or exacerbating factors. Denies chest pain, edema, palpitations, SOB, wheezing, abdominal pain, N/V/D, hematochezia, constipation, dysuria, flank pain, HA, weakness, fever, cough, chills. Vital signs stable in ED - pt was afebrile; WBCs 4.96, H&H 7.03&24.8, platelets 229, Na 140, K+ 3.7, BUN 23, creatinine 1.7, GFR 48, , troponin 0.113; EKG showed sinus arrythmia with 1st degree bundle branch block; xrays showed no acute fracture or dislocation; Stool for c.diff negative, stool for OCB positive; rotavirus negative, COVID-19 negative; CT of the head showed no acute findings; Cxray showed improvement over previous. In ED the pt was given norco, immodium and 1L IV fluids - remained stable throughout without re-occurrence of symptoms. Hospital medicine was called and the pt was placed in observation with telemetry monitoring. Pt is a full code - surrogate decision maker is his sister, Gretchen Green.

## 2022-03-03 NOTE — ASSESSMENT & PLAN NOTE
Echo - 05/25/2021 - EF 40%, grade 1 diastolic dysfunction  Appears clinically compensated  Continue home medications  Cardiac diet  Strict I&Os  Daily weights  ECHO ordered on admit

## 2022-03-04 ENCOUNTER — ANESTHESIA EVENT (OUTPATIENT)
Dept: ENDOSCOPY | Facility: HOSPITAL | Age: 67
End: 2022-03-04
Payer: MEDICARE

## 2022-03-04 ENCOUNTER — ANESTHESIA (OUTPATIENT)
Dept: ENDOSCOPY | Facility: HOSPITAL | Age: 67
End: 2022-03-04
Payer: MEDICARE

## 2022-03-04 DIAGNOSIS — D50.0 IRON DEFICIENCY ANEMIA DUE TO CHRONIC BLOOD LOSS: Primary | ICD-10-CM

## 2022-03-04 LAB
ANION GAP SERPL CALC-SCNC: 6 MMOL/L (ref 8–16)
BASOPHILS # BLD AUTO: 0.01 K/UL (ref 0–0.2)
BASOPHILS NFR BLD: 0.1 % (ref 0–1.9)
BASOPHILS NFR BLD: 0.2 % (ref 0–1.9)
BASOPHILS NFR BLD: 0.2 % (ref 0–1.9)
BUN SERPL-MCNC: 16 MG/DL (ref 8–23)
CALCIUM SERPL-MCNC: 8.2 MG/DL (ref 8.7–10.5)
CHLORIDE SERPL-SCNC: 105 MMOL/L (ref 95–110)
CO2 SERPL-SCNC: 29 MMOL/L (ref 23–29)
CREAT SERPL-MCNC: 1 MG/DL (ref 0.5–1.4)
DIFFERENTIAL METHOD: ABNORMAL
ELASTASE 1, FECAL: 42 MCG/G
EOSINOPHIL # BLD AUTO: 0.3 K/UL (ref 0–0.5)
EOSINOPHIL NFR BLD: 3.9 % (ref 0–8)
EOSINOPHIL NFR BLD: 4.1 % (ref 0–8)
EOSINOPHIL NFR BLD: 4.6 % (ref 0–8)
ERYTHROCYTE [DISTWIDTH] IN BLOOD BY AUTOMATED COUNT: 19.6 % (ref 11.5–14.5)
ERYTHROCYTE [DISTWIDTH] IN BLOOD BY AUTOMATED COUNT: 19.6 % (ref 11.5–14.5)
ERYTHROCYTE [DISTWIDTH] IN BLOOD BY AUTOMATED COUNT: 19.7 % (ref 11.5–14.5)
EST. GFR  (AFRICAN AMERICAN): >60 ML/MIN/1.73 M^2
EST. GFR  (NON AFRICAN AMERICAN): >60 ML/MIN/1.73 M^2
GLUCOSE SERPL-MCNC: 68 MG/DL (ref 70–110)
HCT VFR BLD AUTO: 28.5 % (ref 40–54)
HCT VFR BLD AUTO: 28.5 % (ref 40–54)
HCT VFR BLD AUTO: 28.6 % (ref 40–54)
HGB BLD-MCNC: 8.4 G/DL (ref 14–18)
HGB BLD-MCNC: 8.5 G/DL (ref 14–18)
HGB BLD-MCNC: 8.5 G/DL (ref 14–18)
IMM GRANULOCYTES # BLD AUTO: 0.01 K/UL (ref 0–0.04)
IMM GRANULOCYTES # BLD AUTO: 0.02 K/UL (ref 0–0.04)
IMM GRANULOCYTES # BLD AUTO: 0.02 K/UL (ref 0–0.04)
IMM GRANULOCYTES NFR BLD AUTO: 0.2 % (ref 0–0.5)
IMM GRANULOCYTES NFR BLD AUTO: 0.3 % (ref 0–0.5)
IMM GRANULOCYTES NFR BLD AUTO: 0.3 % (ref 0–0.5)
LYMPHOCYTES # BLD AUTO: 1 K/UL (ref 1–4.8)
LYMPHOCYTES # BLD AUTO: 1 K/UL (ref 1–4.8)
LYMPHOCYTES # BLD AUTO: 1.5 K/UL (ref 1–4.8)
LYMPHOCYTES NFR BLD: 14.9 % (ref 18–48)
LYMPHOCYTES NFR BLD: 17.6 % (ref 18–48)
LYMPHOCYTES NFR BLD: 22 % (ref 18–48)
MAGNESIUM SERPL-MCNC: 2.1 MG/DL (ref 1.6–2.6)
MCH RBC QN AUTO: 23.4 PG (ref 27–31)
MCH RBC QN AUTO: 23.5 PG (ref 27–31)
MCH RBC QN AUTO: 23.8 PG (ref 27–31)
MCHC RBC AUTO-ENTMCNC: 29.5 G/DL (ref 32–36)
MCHC RBC AUTO-ENTMCNC: 29.7 G/DL (ref 32–36)
MCHC RBC AUTO-ENTMCNC: 29.8 G/DL (ref 32–36)
MCV RBC AUTO: 79 FL (ref 82–98)
MCV RBC AUTO: 79 FL (ref 82–98)
MCV RBC AUTO: 81 FL (ref 82–98)
MONOCYTES # BLD AUTO: 0.5 K/UL (ref 0.3–1)
MONOCYTES # BLD AUTO: 0.7 K/UL (ref 0.3–1)
MONOCYTES # BLD AUTO: 0.8 K/UL (ref 0.3–1)
MONOCYTES NFR BLD: 11.5 % (ref 4–15)
MONOCYTES NFR BLD: 12 % (ref 4–15)
MONOCYTES NFR BLD: 7.8 % (ref 4–15)
NEUTROPHILS # BLD AUTO: 3.7 K/UL (ref 1.8–7.7)
NEUTROPHILS # BLD AUTO: 4.4 K/UL (ref 1.8–7.7)
NEUTROPHILS # BLD AUTO: 4.8 K/UL (ref 1.8–7.7)
NEUTROPHILS NFR BLD: 65.4 % (ref 38–73)
NEUTROPHILS NFR BLD: 65.6 % (ref 38–73)
NEUTROPHILS NFR BLD: 69.3 % (ref 38–73)
NRBC BLD-RTO: 0 /100 WBC
PHOSPHATE SERPL-MCNC: 2.6 MG/DL (ref 2.7–4.5)
PLATELET # BLD AUTO: 191 K/UL (ref 150–450)
PLATELET # BLD AUTO: 193 K/UL (ref 150–450)
PLATELET # BLD AUTO: 219 K/UL (ref 150–450)
PMV BLD AUTO: 10.1 FL (ref 9.2–12.9)
PMV BLD AUTO: 11.4 FL (ref 9.2–12.9)
PMV BLD AUTO: 11.5 FL (ref 9.2–12.9)
POCT GLUCOSE: 112 MG/DL (ref 70–110)
POCT GLUCOSE: 183 MG/DL (ref 70–110)
POCT GLUCOSE: 72 MG/DL (ref 70–110)
POCT GLUCOSE: 87 MG/DL (ref 70–110)
POTASSIUM SERPL-SCNC: 3.9 MMOL/L (ref 3.5–5.1)
RBC # BLD AUTO: 3.53 M/UL (ref 4.6–6.2)
RBC # BLD AUTO: 3.61 M/UL (ref 4.6–6.2)
RBC # BLD AUTO: 3.63 M/UL (ref 4.6–6.2)
SODIUM SERPL-SCNC: 140 MMOL/L (ref 136–145)
WBC # BLD AUTO: 5.67 K/UL (ref 3.9–12.7)
WBC # BLD AUTO: 6.65 K/UL (ref 3.9–12.7)
WBC # BLD AUTO: 6.86 K/UL (ref 3.9–12.7)

## 2022-03-04 PROCEDURE — 45378 PR COLONOSCOPY,DIAGNOSTIC: ICD-10-PCS | Mod: ,,, | Performed by: INTERNAL MEDICINE

## 2022-03-04 PROCEDURE — 96374 THER/PROPH/DIAG INJ IV PUSH: CPT | Mod: 59

## 2022-03-04 PROCEDURE — 63600175 PHARM REV CODE 636 W HCPCS: Performed by: NURSE PRACTITIONER

## 2022-03-04 PROCEDURE — 85025 COMPLETE CBC W/AUTO DIFF WBC: CPT | Mod: 91 | Performed by: NURSE PRACTITIONER

## 2022-03-04 PROCEDURE — 94761 N-INVAS EAR/PLS OXIMETRY MLT: CPT

## 2022-03-04 PROCEDURE — 94640 AIRWAY INHALATION TREATMENT: CPT

## 2022-03-04 PROCEDURE — 25000242 PHARM REV CODE 250 ALT 637 W/ HCPCS: Performed by: NURSE PRACTITIONER

## 2022-03-04 PROCEDURE — 43235 EGD DIAGNOSTIC BRUSH WASH: CPT | Mod: 51,,, | Performed by: INTERNAL MEDICINE

## 2022-03-04 PROCEDURE — C9113 INJ PANTOPRAZOLE SODIUM, VIA: HCPCS | Performed by: NURSE PRACTITIONER

## 2022-03-04 PROCEDURE — 45378 DIAGNOSTIC COLONOSCOPY: CPT | Performed by: INTERNAL MEDICINE

## 2022-03-04 PROCEDURE — 36415 COLL VENOUS BLD VENIPUNCTURE: CPT | Performed by: INTERNAL MEDICINE

## 2022-03-04 PROCEDURE — 25000003 PHARM REV CODE 250: Performed by: INTERNAL MEDICINE

## 2022-03-04 PROCEDURE — 83735 ASSAY OF MAGNESIUM: CPT | Performed by: INTERNAL MEDICINE

## 2022-03-04 PROCEDURE — 84100 ASSAY OF PHOSPHORUS: CPT | Performed by: INTERNAL MEDICINE

## 2022-03-04 PROCEDURE — 25000003 PHARM REV CODE 250: Performed by: NURSE ANESTHETIST, CERTIFIED REGISTERED

## 2022-03-04 PROCEDURE — 45378 DIAGNOSTIC COLONOSCOPY: CPT | Mod: ,,, | Performed by: INTERNAL MEDICINE

## 2022-03-04 PROCEDURE — 37000008 HC ANESTHESIA 1ST 15 MINUTES: Performed by: INTERNAL MEDICINE

## 2022-03-04 PROCEDURE — 99900035 HC TECH TIME PER 15 MIN (STAT)

## 2022-03-04 PROCEDURE — 27000221 HC OXYGEN, UP TO 24 HOURS

## 2022-03-04 PROCEDURE — 63600175 PHARM REV CODE 636 W HCPCS: Performed by: NURSE ANESTHETIST, CERTIFIED REGISTERED

## 2022-03-04 PROCEDURE — 25000003 PHARM REV CODE 250: Performed by: NURSE PRACTITIONER

## 2022-03-04 PROCEDURE — G0378 HOSPITAL OBSERVATION PER HR: HCPCS

## 2022-03-04 PROCEDURE — 37000009 HC ANESTHESIA EA ADD 15 MINS: Performed by: INTERNAL MEDICINE

## 2022-03-04 PROCEDURE — 25000003 PHARM REV CODE 250: Performed by: STUDENT IN AN ORGANIZED HEALTH CARE EDUCATION/TRAINING PROGRAM

## 2022-03-04 PROCEDURE — 36415 COLL VENOUS BLD VENIPUNCTURE: CPT | Performed by: NURSE PRACTITIONER

## 2022-03-04 PROCEDURE — 43235 PR EGD, FLEX, DIAGNOSTIC: ICD-10-PCS | Mod: 51,,, | Performed by: INTERNAL MEDICINE

## 2022-03-04 PROCEDURE — 96376 TX/PRO/DX INJ SAME DRUG ADON: CPT | Mod: 59

## 2022-03-04 PROCEDURE — 43235 EGD DIAGNOSTIC BRUSH WASH: CPT | Performed by: INTERNAL MEDICINE

## 2022-03-04 PROCEDURE — 63600175 PHARM REV CODE 636 W HCPCS: Performed by: PHYSICIAN ASSISTANT

## 2022-03-04 PROCEDURE — 80048 BASIC METABOLIC PNL TOTAL CA: CPT | Performed by: INTERNAL MEDICINE

## 2022-03-04 RX ORDER — PROPOFOL 10 MG/ML
VIAL (ML) INTRAVENOUS
Status: DISCONTINUED | OUTPATIENT
Start: 2022-03-04 | End: 2022-03-04

## 2022-03-04 RX ORDER — LIDOCAINE HYDROCHLORIDE 10 MG/ML
INJECTION, SOLUTION EPIDURAL; INFILTRATION; INTRACAUDAL; PERINEURAL
Status: DISCONTINUED | OUTPATIENT
Start: 2022-03-04 | End: 2022-03-04

## 2022-03-04 RX ORDER — ONDANSETRON 2 MG/ML
INJECTION INTRAMUSCULAR; INTRAVENOUS
Status: DISCONTINUED | OUTPATIENT
Start: 2022-03-04 | End: 2022-03-04

## 2022-03-04 RX ADMIN — SODIUM CHLORIDE, SODIUM LACTATE, POTASSIUM CHLORIDE, AND CALCIUM CHLORIDE: .6; .31; .03; .02 INJECTION, SOLUTION INTRAVENOUS at 09:03

## 2022-03-04 RX ADMIN — IRON SUCROSE 200 MG: 20 INJECTION, SOLUTION INTRAVENOUS at 11:03

## 2022-03-04 RX ADMIN — BUDESONIDE 0.5 MG: 0.5 INHALANT ORAL at 07:03

## 2022-03-04 RX ADMIN — TAMSULOSIN HYDROCHLORIDE 0.4 MG: 0.4 CAPSULE ORAL at 11:03

## 2022-03-04 RX ADMIN — ATORVASTATIN CALCIUM 40 MG: 40 TABLET, FILM COATED ORAL at 09:03

## 2022-03-04 RX ADMIN — AMLODIPINE BESYLATE 2.5 MG: 2.5 TABLET ORAL at 11:03

## 2022-03-04 RX ADMIN — ONDANSETRON 4 MG: 2 INJECTION, SOLUTION INTRAMUSCULAR; INTRAVENOUS at 10:03

## 2022-03-04 RX ADMIN — PROPOFOL 100 MG: 10 INJECTION, EMULSION INTRAVENOUS at 09:03

## 2022-03-04 RX ADMIN — Medication 1 TABLET: at 11:03

## 2022-03-04 RX ADMIN — RANOLAZINE 500 MG: 500 TABLET, EXTENDED RELEASE ORAL at 11:03

## 2022-03-04 RX ADMIN — AMLODIPINE BESYLATE 2.5 MG: 2.5 TABLET ORAL at 09:03

## 2022-03-04 RX ADMIN — PANTOPRAZOLE SODIUM 40 MG: 40 INJECTION, POWDER, FOR SOLUTION INTRAVENOUS at 09:03

## 2022-03-04 RX ADMIN — IPRATROPIUM BROMIDE AND ALBUTEROL SULFATE 3 ML: 2.5; .5 SOLUTION RESPIRATORY (INHALATION) at 07:03

## 2022-03-04 RX ADMIN — RANOLAZINE 500 MG: 500 TABLET, EXTENDED RELEASE ORAL at 09:03

## 2022-03-04 RX ADMIN — SERTRALINE HYDROCHLORIDE 100 MG: 50 TABLET ORAL at 09:03

## 2022-03-04 RX ADMIN — METOPROLOL SUCCINATE 50 MG: 50 TABLET, EXTENDED RELEASE ORAL at 09:03

## 2022-03-04 RX ADMIN — LATANOPROST 1 DROP: 50 SOLUTION/ DROPS OPHTHALMIC at 11:03

## 2022-03-04 RX ADMIN — TRAZODONE HYDROCHLORIDE 200 MG: 100 TABLET ORAL at 09:03

## 2022-03-04 RX ADMIN — PANTOPRAZOLE SODIUM 40 MG: 40 INJECTION, POWDER, FOR SOLUTION INTRAVENOUS at 11:03

## 2022-03-04 RX ADMIN — IPRATROPIUM BROMIDE AND ALBUTEROL SULFATE 3 ML: 2.5; .5 SOLUTION RESPIRATORY (INHALATION) at 12:03

## 2022-03-04 RX ADMIN — LIDOCAINE HYDROCHLORIDE 50 MG: 10 INJECTION, SOLUTION EPIDURAL; INFILTRATION; INTRACAUDAL; PERINEURAL at 09:03

## 2022-03-04 NOTE — [employer]ATION PATIENT INSTRUCTIONS
Discharge Summary/Instructions after an Endoscopic Procedure  Patient Name: Crow Green  Patient MRN: [id number]  Patient YOB: 1955 Friday, March 4, 2022 Yolis Freitas MD  Dear patient,  As a result of recent federal legislation (The Federal Cures Act), you may   receive lab or pathology results from your procedure in your [facility]   account before your physician is able to contact you. Your physician or   their representative will relay the results to you with their   recommendations at their soonest availability.  Thank you,  RESTRICTIONS:  During your procedure today, you received medications for sedation.  These   medications may affect your judgment, balance and coordination.  Therefore,   for 24 hours, you have the following restrictions:   - DO NOT drive a car, operate machinery, make legal/financial decisions,   sign important papers or drink alcohol.    ACTIVITY:  Today: no heavy lifting, straining or running due to procedural   sedation/anesthesia.  The following day: return to full activity including work.  DIET:  Eat and drink normally unless instructed otherwise.     TREATMENT FOR COMMON SIDE EFFECTS:  - Mild abdominal pain, nausea, belching, bloating or excessive gas:  rest,   eat lightly and use a heating pad.  - Sore Throat: treat with throat lozenges and/or gargle with warm salt   water.  - Because air was used during the procedure, expelling large amounts of air   from your rectum or belching is normal.  - If a bowel prep was taken, you may not have a bowel movement for 1-3 days.    This is normal.  SYMPTOMS TO WATCH FOR AND REPORT TO YOUR PHYSICIAN:  1. Abdominal pain or bloating, other than gas cramps.  2. Chest pain.  3. Back pain.  4. Signs of infection such as: chills or fever occurring within 24 hours   after the procedure.  5. Rectal bleeding, which would show as bright red, maroon, or black stools.   (A tablespoon of blood from the rectum is not serious, especially  if   hemorrhoids are present.)  6. Vomiting.  7. Weakness or dizziness.  GO DIRECTLY TO THE NEAREST EMERGENCY ROOM IF YOU HAVE ANY OF THE FOLLOWING:      Difficulty breathing              Chills and/or fever over 101 F   Persistent vomiting and/or vomiting blood   Severe abdominal pain   Severe chest pain   Black, tarry stools   Bleeding- more than one tablespoon   Any other symptom or condition that you feel may need urgent attention  Your doctor recommends these additional instructions:  If any biopsies were taken, your doctors clinic will contact you in 1 to 2   weeks with any results.  - Discharge patient to home (via wheelchair).   - Resume previous diet.   - Continue present medications.   - Repeat colonoscopy in 1 month because the bowel preparation was   suboptimal.   - Patient has a contact number available for emergencies.  The signs and   symptoms of potential delayed complications were discussed with the   patient.  Return to normal activities tomorrow.  Written discharge   instructions were provided to the patient.  For questions, problems or results please call your physician Yolis Freitas MD at Work:  [phone]  If you have any questions about the above instructions, call the GI   department at [phone] or call the endoscopy unit at [phone]   from 7am until 3 pm.  [facility] [place], EMERGENCY ROOM PHONE NUMBER:   [phone]  IF A COMPLICATION OR EMERGENCY SITUATION ARISES AND YOU ARE UNABLE TO REACH   YOUR PHYSICIAN - GO DIRECTLY TO THE EMERGENCY ROOM.  I have read or have had read to me these discharge instructions for my   procedure and have received a written copy.  I understand these   instructions and will follow-up with my physician if I have any questions.     __________________________________       _____________________________________  Nurse Signature                                          Patient/Designated   Responsible Party  Signature  MD Yolis Jin MD  3/4/2022 9:53:35 AM  PR[employer]ATION

## 2022-03-04 NOTE — PROGRESS NOTES
[facility] ([facility])  [facility]  Progress Note    Patient Name: Crow Green  MRN: [id number]  Patient Class: OP- Observation   Admission Date: 3/2/2022  Length of Stay: 0 days  Attending Physician: Dakota Francis MD  Primary Care Provider: Mateo Lambert DO        Subjective:     Principal Problem:GI bleed        HPI:  65 y/o AA M with hx of CAD, combined HF, DM2, pAfib, anemia, B hip/shoulder pain, HTN, SANDRITA on CPAP, asthma, gout, GERD, glaucoma, arthritis, CKD 3, HLD, back pain, depression, DOMINIK, COPD to ED after experiencing a ground-level fall immediately PTA - states he did not hit is head but felt his knees go out from under him - is not sure if he had LOC - currently reporting pain to the R shoulder, wrist and BLE. Also reporting diarrhea for the past 4 days consisting of black stool. Symptoms were episodic and of moderate severity, without known mitigating or exacerbating factors. Denies chest pain, edema, palpitations, SOB, wheezing, abdominal pain, N/V/D, hematochezia, constipation, dysuria, flank pain, HA, weakness, fever, cough, chills. Vital signs stable in ED - pt was afebrile; WBCs 4.96, H&H 7.03&24.8, platelets 229, Na 140, K+ 3.7, BUN 23, creatinine 1.7, GFR 48, , troponin 0.113; EKG showed sinus arrythmia with 1st degree bundle branch block; xrays showed no acute fracture or dislocation; Stool for c.diff negative, stool for OCB positive; rotavirus negative, COVID-19 negative; CT of the head showed no acute findings; Cxray showed improvement over previous. In ED the pt was given norco, immodium and 1L IV fluids - remained stable throughout without re-occurrence of symptoms. Hospital medicine was called and the pt was placed in observation with telemetry monitoring. Pt is a full code - surrogate decision maker is his sister, Gretchen Green.       Overview/Hospital Course:  H/O PAF, CAD, combined CHF( EF 45%) on ASA/ Eliquis admitted for symptomatic anemia and  "melena. Hgb on admission was 7.3 with baseline 10 to 11. S/P 1 unit of P-RBC in the ED with repeat Hgb 8.3 this morning. Asa/Eliquis held and Pt started on IV PPI and GI consulted.     3/3- Monitor H/H. Continue PPI. Continue to hold ASA, Eliquis for now. GI consult obtained and plan for EGD/Colonoscopy tomorrow. BP meds were adjusted to BP trend. Monitor vitals.     3/4- NAEO. EGD and colonoscopy completed. No signs of GI bleeding. Will plan for outpatient video capsule to complete work-up. No further GI recommendations at this time. Expect stable for d/c in AM pending repeat CBC      Interval History:   S/P P-RBC x 1 unit. EGD and colonoscopy completed. No signs of GI bleeding. Will plan for outpatient video capsule to complete work-up. No further GI recommendations at this time      Review of Systems   Constitutional:  Positive for activity change and fatigue. Negative for chills, diaphoresis and fever.   HENT:  Negative for congestion, trouble swallowing and voice change.    Eyes:  Negative for photophobia and discharge.   Respiratory:  Negative for cough, chest tightness, shortness of breath and wheezing.    Cardiovascular:  Negative for chest pain, palpitations and leg swelling.   Gastrointestinal:  Positive for blood in stool and diarrhea ("black"). Negative for abdominal pain, constipation, nausea and vomiting.   Endocrine: Negative for cold intolerance and heat intolerance.   Genitourinary:  Negative for difficulty urinating, dysuria, flank pain, frequency and urgency.   Musculoskeletal:  Positive for arthralgias (R shoulder, wrist, BLE). Negative for back pain, joint swelling and myalgias.   Skin:  Negative for rash and wound.   Neurological:  Positive for dizziness and syncope. Negative for seizures, facial asymmetry, weakness, light-headedness, numbness and headaches.   Psychiatric/Behavioral:  Negative for confusion and hallucinations.    Objective:     Vital Signs (Most Recent):  Temp: 98.1 °F (36.7 " °C) (03/04/22 1619)  Pulse: 73 (03/04/22 1619)  Resp: 18 (03/04/22 1619)  BP: 119/70 (03/04/22 1619)  SpO2: 95 % (03/04/22 1619) Vital Signs (24h Range):  Temp:  [97.5 °F (36.4 °C)-98.6 °F (37 °C)] 98.1 °F (36.7 °C)  Pulse:  [71-80] 73  Resp:  [16-20] 18  SpO2:  [95 %-99 %] 95 %  BP: (103-130)/(59-83) 119/70     Weight: 91 kg (200 lb 9.9 oz)  Body mass index is 33.38 kg/m².    Intake/Output Summary (Last 24 hours) at 3/4/2022 1621  Last data filed at 3/4/2022 0950  Gross per 24 hour   Intake 200 ml   Output --   Net 200 ml        Physical Exam  Vitals reviewed.   Constitutional:       General: He is not in acute distress.     Appearance: Normal appearance. He is normal weight. He is ill-appearing. He is not toxic-appearing.   HENT:      Head: Normocephalic and atraumatic.      Nose: Nose normal. No congestion.      Mouth/Throat:      Mouth: Mucous membranes are moist.   Eyes:      Pupils: Pupils are equal, round, and reactive to light.   Cardiovascular:      Rate and Rhythm: Normal rate and regular rhythm.      Pulses: Normal pulses.      Heart sounds: Normal heart sounds.   Pulmonary:      Effort: Pulmonary effort is normal. No respiratory distress.      Breath sounds: Normal breath sounds. No wheezing or rhonchi.   Abdominal:      General: Abdomen is flat. Bowel sounds are normal. There is no distension.      Palpations: Abdomen is soft.      Tenderness: There is no abdominal tenderness. There is no rebound.   Musculoskeletal:         General: Deformity (L 1st toe amputation) present. Normal range of motion.      Cervical back: Normal range of motion and neck supple. No tenderness.   Skin:     General: Skin is warm and dry.      Capillary Refill: Capillary refill takes less than 2 seconds.      Findings: No bruising or erythema.   Neurological:      General: No focal deficit present.      Mental Status: He is alert and oriented to person, place, and time.      Sensory: No sensory deficit.      Motor: No weakness.    Psychiatric:         Mood and Affect: Mood normal.         Behavior: Behavior normal.         Thought Content: Thought content normal.         Judgment: Judgment normal.       Significant Labs: All pertinent labs within the past 24 hours have been reviewed.  BMP:   Recent Labs   Lab 03/04/22  0438   GLU 68*      K 3.9      CO2 29   BUN 16   CREATININE 1.0   CALCIUM 8.2*   MG 2.1       CBC:   Recent Labs   Lab 03/03/22  1556 03/04/22  0013 03/04/22  1135   WBC 5.16 6.65 6.86   HGB 9.1*  9.1* 8.5* 8.4*   HCT 30.3*  30.5* 28.5* 28.5*    219 193       CMP:   Recent Labs   Lab 03/02/22  1835 03/03/22  0503 03/04/22  0438    141 140   K 3.7 4.1 3.9    105 105   CO2 27 27 29   GLU 75 92 68*   BUN 23 27* 16   CREATININE 1.7* 1.5* 1.0   CALCIUM 8.5* 7.8* 8.2*   PROT 6.8 6.8  --    ALBUMIN 3.1* 3.0*  --    BILITOT 0.2 0.3  --    ALKPHOS 68 72  --    AST 19 18  --    ALT 11 14  --    ANIONGAP 12 9 6*   EGFRNONAA 42* 49* >60       Cardiac Markers:   Recent Labs   Lab 03/02/22  1835   *         Significant Imaging:       Assessment/Plan:      * GI bleed  Stool for OCB positive in ED  Gastroenterology consulted  1 unit PRBCs transfused  Trend H&H, transfuse as indicated  Protonix 40mg IV BID  NPO in case of AM testing/procedure  Holding eliquis, ASA for now - resume as appropriate  3/3-  GI consult obtained and plan for EGD/Colonoscopy tomorrow  NPO after midnight     3/4-  EGD and colonoscopy completed. No signs of GI bleeding. Will plan for outpatient video capsule to complete work-up. No further GI recommendations at this time  Repeat CBC in AM    Diarrhea  - Work up ordered on admit   -Follow up stool studies       Syncope and collapse  Likely sequelae of anemia  Orthostatic VS as ordered  VS per unit routine  Fall precautions  ECHO ordered   B carotid doppler u/s ordered   Continuous cardiac monitoring      Symptomatic anemia  Likely secondary to GI bleed  Stool for OCB positive  H&H  at 7.3&24.8 in ED with baseline 10 to 11  Gastroenterology consulted  Type and screen sent  1 unit PRBCs given  Trend H&H - transfuse as indicated  Iron studies pending  Ferritin 24 suggestive of depleted iron store   Holding ASA, eliquis for now  NPO after midnight       Chronic obstructive pulmonary disease  No evidence of acute exacerbation  Supplemental O2 prn  Duonebs scheduled and prn  Pulmicort nebs BID      Elevated troponin  Denies chest pain - EKG unrevealing  Troponin at 0.113 in ED - stable at baseline  Continue home meds as appropriate  Continuous cardiac monitoring  ECHO ordered on admit      Hyperlipidemia associated with type 2 diabetes mellitus  Continue home statin      PAF (paroxysmal atrial fibrillation)  Currently sinus arrythmia - rate controlled  Continue home medications as appropriate  Holding home eliquis/ASA for now d/t GI bleed - resume as appropriate  Continuous cardiac monitoring    H/O Asthma  No evidence of acute exacerbation  Supplemental O2 prn  Duonebs scheduled and prn    SANDRITA on CPAP  CPAP qHS at home settings  RT to adjust as needed      Type 2 diabetes mellitus  A1c at 6.7 on 12/02/2021 / BG in ED at 75  Accuchecks with SSI prn   Holding home jardiance, ozempic  Hold Levemir while NPO        Chronic combined systolic and diastolic congestive heart failure  Echo - 05/25/2021 - EF 40%, grade 1 diastolic dysfunction  Appears clinically compensated  Continue home medications  Cardiac diet  Strict I&Os  Daily weights  ECHO ordered on admit     Nonobstructive coronary artery disease of native artery of native heart  Denies chest pain - EKG unrevealing  Continue home medications  Continuous cardiac monitoring  EKG prn      Essential hypertension  Currently well-controlled  Home medications reviewed and resumed as appropriate   Hydralazine IV prn SBP > 170  VS per unit routine  Monitor closely        VTE Risk Mitigation (From admission, onward)         Ordered     Place sequential  compression device  Until discontinued         03/03/22 0235                Discharge Planning   REJI:      Code Status: Full Code   Is the patient medically ready for discharge?:     Reason for patient still in hospital (select all that apply): Laboratory test  Discharge Plan A: Home with family            Dakota Francis MD  Department of Hospital Medicine   [facility] ([facility])

## 2022-03-04 NOTE — H&P
Short Stay Endoscopy History and Physical    PCP - Mateo Lambert, DO    Procedure - EGD and colonoscopy  ASA - 3  Mallampati - per anesthesia  History of Anesthesia problems - no  Family history Anesthesia problems -  no     HPI:  This is a 66 y.o. male here for evaluation of :   Active Hospital Problems    Diagnosis  POA    *GI bleed [K92.2]  Yes    Syncope and collapse [R55]  Yes    Diarrhea [R19.7]  Yes    Symptomatic anemia [D64.9]  Yes    Chronic obstructive pulmonary disease [J44.9]  Yes    Elevated troponin [R77.8]  Yes    Hyperlipidemia associated with type 2 diabetes mellitus [E11.69, E78.5]  Yes    PAF (paroxysmal atrial fibrillation) [I48.0]  Yes     Chronic    H/O Asthma [J45.909]  Yes    SANDRITA on CPAP [G47.33, Z99.89]  Not Applicable    Type 2 diabetes mellitus [E11.9]  Yes    Chronic combined systolic and diastolic congestive heart failure [I50.42]  Yes    Nonobstructive coronary artery disease of native artery of native heart [I25.118]  Yes     Chronic    Essential hypertension [I10]  Yes      Resolved Hospital Problems   No resolved problems to display.         Health Maintenance       Date Due Completion Date    Eye Exam 04/06/2022 4/6/2021    COVID-19 Vaccine (3 - Booster for Pfizer series) 04/19/2022 11/19/2021    Lipid Panel 05/25/2022 5/25/2021    Foot Exam 06/03/2022 6/3/2021 (Done)    Override on 6/3/2021: Done    Override on 2/21/2020: Done    Diabetes Urine Screening 07/08/2022 7/8/2021    Hemoglobin A1c 09/03/2022 3/3/2022    PROSTATE-SPECIFIC ANTIGEN 09/16/2022 9/16/2021    Colorectal Cancer Screening 03/02/2023 3/2/2022    TETANUS VACCINE 07/23/2030 7/23/2020        ROS:  CONSTITUTIONAL: Denies weight change,  fatigue, fevers, chills, night sweats.  CARDIOVASCULAR: Denies chest pain, shortness of breath, orthopnea and edema.  RESPIRATORY: Denies cough, hemoptysis, dyspnea, and wheezing.  GI: See HPI.    Medical History:   Past Medical History:   Diagnosis Date    Arthritis      Asthma     Atrial fibrillation     Atrial fibrillation with RVR 8/7/2019    CHF (congestive heart failure)     Chronic obstructive pulmonary disease 8/5/2020    Depression     Dermatomyositis     Diabetes mellitus, type 2 Diagnosed in 2000    Elevated PSA     Follow up 7/30/2020    Hypertension     Myocardial infarction     2017    Sleep apnea        Surgical History:   Past Surgical History:   Procedure Laterality Date    ABLATION OF ARRHYTHMOGENIC FOCUS FOR ATRIAL FIBRILLATION N/A 2/27/2020    Procedure: Ablation atrial fibrillation;  Surgeon: Gio Brown MD;  Location: [facility] EP LAB;  Service: Cardiology;  Laterality: N/A;  afib, DAMIEN (cx if SR), PVI, PITO, anes, MB, 3 Prep    CHOLECYSTECTOMY      CLOSURE OF WOUND Left 7/1/2020    Procedure: CLOSURE, WOUND;  Surgeon: Barb Veras DPM;  Location: [facility];  Service: Podiatry;  Laterality: Left;    INJECTION OF ANESTHETIC AGENT INTO SACROILIAC JOINT Left 3/24/2021    Procedure: Left BLOCK, SACROILIAC JOINT and bilateral rhomboid TPI;  Surgeon: Dillan Tsai MD;  Location: [facility] PAIN MGT;  Service: Pain Management;  Laterality: Left;    REVERSE TOTAL SHOULDER ARTHROPLASTY Left 1/10/2022    Procedure: ARTHROPLASTY, SHOULDER, TOTAL, REVERSE;  Surgeon: Anthony Alvarado MD;  Location: [facility] OR;  Service: Orthopedics;  Laterality: Left;  left reverse total shoulder arthroplasty     SELECTIVE INJECTION OF ANESTHETIC AGENT AROUND LUMBAR SPINAL NERVE ROOT BY TRANSFORAMINAL APPROACH Left 6/11/2020    Procedure: BLOCK, SPINAL NERVE ROOT, LUMBAR, SELECTIVE, TRANSFORAMINAL APPROACH Left L4-5, L5-S1 TFESI with RN IV sedation;  Surgeon: Dillan Tsai MD;  Location: [facility]IN [facility]GT;  Service: Pain Management;  Laterality: Left;    TOE AMPUTATION Left 6/29/2020    Procedure: AMPUTATION, TOE;  Surgeon: Barb Veras DPM;  Location: [facility] OR;  Service: Podiatry;  Laterality: Left;  great toe       Family History:   Family History   Problem Relation  Age of Onset    Glaucoma Mother     Cataracts Mother     Diabetes Mother     Cataracts Father     Stroke Father     Glaucoma Sister         x3    Cataracts Sister         x3    Diabetes Sister         x1    Stroke Sister         x1    Glaucoma Maternal Aunt     Diabetes Maternal Aunt     No Known Problems Brother     No Known Problems Daughter     No Known Problems Son     Cancer Maternal Grandfather         lung (smoker)    Prostate cancer Neg Hx     Heart disease Neg Hx     Kidney disease Neg Hx        Social History:   Social History     Tobacco Use    Smoking status: Never Smoker    Smokeless tobacco: Never Used   Substance Use Topics    Alcohol use: Yes     Comment: rare, maybe holidays    Drug use: Not Currently     Types: Cocaine       Allergies:   Review of patient's allergies indicates:   Allergen Reactions    Protamine Hives     Urticaria, possible upper airway swelling       Medications:   No current facility-administered medications on file prior to encounter.     Current Outpatient Medications on File Prior to Encounter   Medication Sig Dispense Refill    aspirin (ECOTRIN) 81 MG EC tablet Take 1 tablet (81 mg total) by mouth once daily.  0    acetaminophen (TYLENOL) 500 MG tablet Take 2 tablets (1,000 mg total) by mouth every 8 (eight) hours as needed for Pain. 60 tablet 0    albuterol (PROVENTIL) 2.5 mg /3 mL (0.083 %) nebulizer solution Take 3 mLs (2.5 mg total) by nebulization every 4 to 6 hours as needed for Wheezing or Shortness of Breath. 360 mL 11    allopurinoL (ZYLOPRIM) 100 MG tablet Take 1 tablet (100 mg total) by mouth once daily. 90 tablet 3    amLODIPine (NORVASC) 2.5 MG tablet Take 1 tablet (2.5 mg total) by mouth every evening. 30 tablet 3    atorvastatin (LIPITOR) 40 MG tablet Take 1 tablet (40 mg total) by mouth every evening. 90 tablet 4    azaTHIOprine (IMURAN) 50 mg Tab Take 1 tablet (50 mg total) by mouth once daily. 90 tablet 0    baclofen (LIORESAL)  10 MG tablet TAKE 1 TABLET EVERY DAY 90 tablet 0    blood sugar diagnostic Strp 1 each by Misc.(Non-Drug; Combo Route) route 4 (four) times daily. 300 each 3    blood-glucose meter (TRUE METRIX GLUCOSE METER) kit Use as instructed to test blood glucose meter daily. 1 each 1    colchicine (COLCRYS) 0.6 mg tablet TAKE 1 TABLET (0.6 MG TOTAL) BY MOUTH ONCE DAILY. 90 tablet 0    DEPO-MEDROL 40 mg/mL injection       diclofenac sodium (VOLTAREN) 1 % Gel APPLY 2 GRAMS TOPICALLY FOUR TIMES DAILY 600 g 2    ELIQUIS 5 mg Tab TAKE 1 TABLET TWICE DAILY 180 tablet 1    empagliflozin (JARDIANCE) 25 mg tablet Take 1 tablet (25 mg total) by mouth once daily. 90 tablet 0    famotidine (PEPCID) 20 MG tablet Take 1 tablet (20 mg total) by mouth 2 (two) times daily. 20 tablet 0    fentaNYL (SUBLIMAZE) 50 mcg/mL injection       fluticasone-salmeterol diskus inhaler 250-50 mcg Inhale 1 puff into the lungs 2 (two) times daily. Controller 180 each 3    food supplemt, lactose-reduced (ENSURE) Liqd Take 118 mLs by mouth 3 (three) times daily with meals. 1 Bottle 4    furosemide (LASIX) 40 MG tablet Take 1 tablet (40 mg total) by mouth once daily. TAKE 2 TABLETS EVERY MORNING  AND TAKE 1 TABLET EVERY EVENING (Patient taking differently: TAKE 2 TABLETS EVERY MORNING  AND TAKE 1 TABLET EVERY EVENING) 270 tablet 3    gabapentin (NEURONTIN) 800 MG tablet Take 1 tablet (800 mg total) by mouth 3 (three) times daily. 270 tablet 4    heparin sodium,porcine/D5W (HEPARIN, PORCINE, IN 5 % DEX) 25,000 unit/250 mL(100 unit/mL) infusion       HYDROcodone-acetaminophen (NORCO) 5-325 mg per tablet Take 1 tablet by mouth every 6 (six) hours as needed for Pain. 24 tablet 0    HYDROcodone-acetaminophen (NORCO) 5-325 mg per tablet Take 1 tablet by mouth every 6 (six) hours as needed for Pain. 24 tablet 0    insulin aspart U-100 (NOVOLOG FLEXPEN U-100 INSULIN) 100 unit/mL (3 mL) InPn pen INJECT 10 UNITS SUBCUTANEOUSLY THREE TIMES DAILY WITH MEALS  15 mL 0    insulin degludec (TRESIBA FLEXTOUCH U-200) 200 unit/mL (3 mL) insulin pen Inject 40 Units into the skin once daily. (Patient taking differently: Inject 38 Units into the skin once daily.) 6 pen 3    ipratropium (ATROVENT) 0.02 % nebulizer solution INHALE THE CONTENTS OF 1 VIAL VIA NEBULIZER FOUR TIMES DAILY 1 Box 0    isosorbide mononitrate (IMDUR) 60 MG 24 hr tablet Take 1 tablet (60 mg total) by mouth once daily. 90 tablet 1    lancets Misc 1 each by Misc.(Non-Drug; Combo Route) route 4 (four) times daily. 300 each 3    latanoprost 0.005 % ophthalmic solution INSTILL 1 DROP INTO BOTH EYES ONE TIME DAILY 5 mL 3    lisinopriL (PRINIVIL,ZESTRIL) 30 MG tablet Take 1 tablet by mouth once daily.      magnesium oxide (MAG-OX) 400 mg (241.3 mg magnesium) tablet Take 1 tablet (400 mg total) by mouth once daily.  0    metoprolol succinate (TOPROL-XL) 50 MG 24 hr tablet Take 1 tablet (50 mg total) by mouth every evening. 90 tablet 3    midazolam (VERSED) 1 mg/mL injection       naproxen (EC NAPROSYN) 500 MG EC tablet TAKE 1 TABLET(500 MG) BY MOUTH TWICE DAILY WITH MEALS 28 tablet 0    nitroGLYCERIN (NITROSTAT) 0.4 MG SL tablet Place 1 tablet (0.4 mg total) under the tongue every 5 (five) minutes as needed for Chest pain. 50 tablet 12    OMNIPAQUE 240 240 mg iodine/mL injection       OZEMPIC 1 mg/dose (2 mg/1.5 mL) PnIj Inject 0.75 mLs (1 mg total) into the skin once a week. 3 mL 11    OZEMPIC 1 mg/dose (4 mg/3 mL) INJECT 1MG SUBCUTANEOUSLY ONE TIME WEEKLY 9 pen 1    pantoprazole (PROTONIX) 40 MG tablet Take 1 tablet (40 mg total) by mouth once daily. 30 tablet 0    ranolazine (RANEXA) 500 MG Tb12 Take 1 tablet (500 mg total) by mouth 2 (two) times daily. 60 tablet 11    sertraline (ZOLOFT) 100 MG tablet Take 1 tablet (100 mg total) by mouth every evening. 90 tablet 2    tacrolimus (PROTOPIC) 0.1 % ointment Apply topically 2 (two) times daily. 100 g 2    tamsulosin (FLOMAX) 0.4 mg Cap Take 1  capsule (0.4 mg total) by mouth once daily. 30 capsule 11    tiotropium (SPIRIVA WITH HANDIHALER) 18 mcg inhalation capsule INHALE THE CONTENTS OF 1 CAPSULE ONE TIME DAILY (CONTROLLER) 90 capsule 3    traMADoL (ULTRAM) 50 mg tablet Take 1 tablet (50 mg total) by mouth every 4 (four) hours as needed for Pain. 30 tablet 0    traZODone (DESYREL) 100 MG tablet Take 2 tablets (200 mg total) by mouth every evening. 180 tablet 1    triamcinolone acetonide 0.1% (KENALOG) 0.1 % ointment Apply topically 2 (two) times daily. Use on legs and back 454 g 0    VIOS AEROSOL DELIVERY SYSTEM Shefali USE AS DIRESTED  0       Physical Exam:  Vital Signs:   Vitals:    03/04/22 0834   BP: 128/79   Pulse: 74   Resp: 18   Temp: 98.1 °F (36.7 °C)     General Appearance: Well appearing in no acute distress  ENT: OP clear  Chest: CTA B  CV: RRR, no m/r/g  Abd: s/nt/nd/nabs  Ext: no edema    Labs:Reviewed    IMP:  Active Hospital Problems    Diagnosis  POA    *GI bleed [K92.2]  Yes    Syncope and collapse [R55]  Yes    Diarrhea [R19.7]  Yes    Symptomatic anemia [D64.9]  Yes    Chronic obstructive pulmonary disease [J44.9]  Yes    Elevated troponin [R77.8]  Yes    Hyperlipidemia associated with type 2 diabetes mellitus [E11.69, E78.5]  Yes    PAF (paroxysmal atrial fibrillation) [I48.0]  Yes     Chronic    H/O Asthma [J45.909]  Yes    SANDRITA on CPAP [G47.33, Z99.89]  Not Applicable    Type 2 diabetes mellitus [E11.9]  Yes    Chronic combined systolic and diastolic congestive heart failure [I50.42]  Yes    Nonobstructive coronary artery disease of native artery of native heart [I25.118]  Yes     Chronic    Essential hypertension [I10]  Yes      Resolved Hospital Problems   No resolved problems to display.         Plan:   I have explained the risks and benefits of upper endoscopy and colonoscopy to the patient including but not limited to bleeding, perforation, infection, and death. The patient wishes to proceed.

## 2022-03-04 NOTE — ANESTHESIA PREPROCEDURE EVALUATION
03/04/2022  Crow Green is a 66 y.o., male.  Patient Active Problem List   Diagnosis    ED (erectile dysfunction)    Non-proliferative diabetic retinopathy, mild, both eyes    Essential hypertension    Diabetic polyneuropathy    Nonobstructive coronary artery disease of native artery of native heart    Chronic combined systolic and diastolic congestive heart failure    Type 2 diabetes mellitus    SANDRITA on CPAP    H/O Asthma    Psychophysiological insomnia    Nightmares    Sleep related gastroesophageal reflux disease    Inadequate sleep hygiene    PAF (paroxysmal atrial fibrillation)    At risk for medication noncompliance    Obesity (BMI 30.0-34.9)    Indeterminate stage chronic open angle glaucoma    Right hip pain    Preop cardiovascular exam    Gait instability    Chronic right shoulder pain    Arthritis    Left sided sciatica    Dyspnea on exertion    Osteoarthritis of spine with radiculopathy, lumbar region    HNP (herniated nucleus pulposus), lumbar    Gastroesophageal reflux disease without esophagitis    Chronic diastolic heart failure    Hypogonadism in male    Disorder of parathyroid gland    Hyperlipidemia associated with type 2 diabetes mellitus    LRTI (lower respiratory tract infection)    Traumatic tear of left rotator cuff    Other chronic pain    Left shoulder pain    Complete tear of left rotator cuff    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 associated with type 2 diabetes mellitus    Lumbar radiculopathy    Diabetic ulcer of toe of left foot associated with type 2 diabetes mellitus, with necrosis of muscle    Ulcer of left foot    Elevated troponin    Hypotension due to medication    Dermatomyositis    Postprocedural hypotension    Advanced directives, counseling/discussion    Cocaine abuse with  intoxication    Schizoaffective disorder, depressive type    Chronic obstructive pulmonary disease    MDD (major depressive disorder), recurrent episode, moderate    DOMINIK (generalized anxiety disorder)    Bilateral carpal tunnel syndrome    Rotator cuff tear arthropathy of left shoulder    Chronic left shoulder pain    Sacroiliac dysfunction    Generalized weakness    Atherosclerosis of native coronary artery of native heart with unstable angina pectoris    Coronary vasospasm    Cocaine abuse    Osteopenia    Symptomatic anemia    Atherosclerosis of aorta    Cardiac asthma    Pre-operative respiratory examination    Arm weakness    Decreased range of motion (ROM) of shoulder    Syncope and collapse    GI bleed    Diarrhea     Past Surgical History:   Procedure Laterality Date    ABLATION OF ARRHYTHMOGENIC FOCUS FOR ATRIAL FIBRILLATION N/A 2/27/2020    Procedure: Ablation atrial fibrillation;  Surgeon: Gio Brown MD;  Location: [facility] EP LAB;  Service: Cardiology;  Laterality: N/A;  afib, DAMIEN (cx if SR), PVI, PITO, anes, MB, 3 Prep    CHOLECYSTECTOMY      CLOSURE OF WOUND Left 7/1/2020    Procedure: CLOSURE, WOUND;  Surgeon: Barb Veras DPM;  Location: [facility] OR;  Service: Podiatry;  Laterality: Left;    INJECTION OF ANESTHETIC AGENT INTO SACROILIAC JOINT Left 3/24/2021    Procedure: Left BLOCK, SACROILIAC JOINT and bilateral rhomboid TPI;  Surgeon: Dillan Tsai MD;  Location: [facility] PAIN MGT;  Service: Pain Management;  Laterality: Left;    REVERSE TOTAL SHOULDER ARTHROPLASTY Left 1/10/2022    Procedure: ARTHROPLASTY, SHOULDER, TOTAL, REVERSE;  Surgeon: Anthony Alvarado MD;  Location: [facility] OR;  Service: Orthopedics;  Laterality: Left;  left reverse total shoulder arthroplasty     SELECTIVE INJECTION OF ANESTHETIC AGENT AROUND LUMBAR SPINAL NERVE ROOT BY TRANSFORAMINAL APPROACH Left 6/11/2020    Procedure: BLOCK, SPINAL NERVE ROOT, LUMBAR, SELECTIVE, TRANSFORAMINAL APPROACH  Left L4-5, L5-S1 TFESI with RN IV sedation;  Surgeon: Dillan Tsai MD;  Location: [facility] PAIN T;  Service: Pain Management;  Laterality: Left;    TOE AMPUTATION Left 6/29/2020    Procedure: AMPUTATION, TOE;  Surgeon: Barb Veras DPM;  Location: [facility] OR;  Service: Podiatry;  Laterality: Left;  great toe       Pre-op Assessment    I have reviewed the Patient Summary Reports.    I have reviewed the Nursing Notes. I have reviewed the NPO Status.   I have reviewed the Medications.     Review of Systems  Anesthesia Hx:  No problems with previous Anesthesia    Social:  Non-Smoker Hx Cocaine abuse   Hematology/Oncology:  Hematology Normal        Cardiovascular:   Hypertension, well controlled Past MI CAD  Dysrhythmias atrial fibrillation Angina CHF VARGAS ECG has been reviewed. Sinus rhythm with marked sinus arrythmia with 1st degree A-V block   Left anterior fascicular block   Minimal voltage criteria for LVH, may be normal variant ( Evans product )   Septal infarct (cited on or before 17-JUN-2021)   Abnormal ECG   When compared with ECG of 25-FEB-2022 09:12,   Questionable change in initial forces of Septal leads   Confirmed by CRISS BOYD MD ([id number]) on 3/3/2022 4:28:49 PM     ECHO  CONCLUSIONS     1 - Mildly depressed left ventricular systolic function (EF 45-50%).     2 - Impaired LV relaxation, normal LAP (grade 1 diastolic dysfunction).     3 - Normal right ventricular systolic function .     4 - Moderate left atrial enlargement.     5 - Concentric hypertrophy.    Pulmonary:   COPD, mild Asthma mild Shortness of breath Sleep Apnea, CPAP    Renal/:  Renal/ Normal     Hepatic/GI:   GERD    Musculoskeletal:   Arthritis     Neurological:   Diabetic polyneuropathy  Lumbar radiculopathy  Peripheral Neuropathy    Endocrine:   Diabetes, well controlled, type 2    Psych:   Psychiatric History depression Schizoaffective disorder, depressive type         Physical Exam  General:  Well nourished and Obesity       Airway/Jaw/Neck:  Airway Findings: Mouth Opening: Normal   Tongue: Normal   General Airway Assessment: Adult Mallampati: II  TM Distance: 4 - 6 cm   Jaw/Neck Findings:  Neck ROM: Normal ROM       Dental:  Dental Findings: In tact, Intact     Chest/Lungs:  Chest/Lungs Findings: Clear to auscultation, Normal Respiratory Rate      Heart/Vascular:  Heart Findings: Rate: Normal  Rhythm: Regular Rhythm  Sounds: Normal        Mental Status:  Mental Status Findings:  Cooperative, Alert and Oriented         Anesthesia Plan  Type of Anesthesia, risks & benefits discussed:  Anesthesia Type:  MAC    Patient's Preference:   Plan Factors:          Intra-op Monitoring Plan: Standard ASA Monitors and standard ASA monitors  Intra-op Monitoring Plan Comments:   Post Op Pain Control Plan: multimodal analgesia and per primary service following discharge from PACU  Post Op Pain Control Plan Comments:     Induction:   IV  Beta Blocker:  Patient is on a Beta-Blocker and has received one dose within the past 24 hours (No further documentation required).       Informed Consent: Informed consent signed with the Patient and all parties understand the risks and agree with anesthesia plan.  All questions answered.  Anesthesia consent signed with patient.  ASA Score: 3     Day of Surgery Review of History & Physical: I have interviewed and examined the patient. I have reviewed the patient's H&P dated:  There are no significant changes.  H&P Update referred to the surgeon/provider.          Ready For Surgery From Anesthesia Perspective.         Chemistry        Component Value Date/Time     03/04/2022 0438    K 3.9 03/04/2022 0438     03/04/2022 0438    CO2 29 03/04/2022 0438    BUN 16 03/04/2022 0438    CREATININE 1.0 03/04/2022 0438    GLU 68 (L) 03/04/2022 0438        Component Value Date/Time    CALCIUM 8.2 (L) 03/04/2022 0438    ALKPHOS 72 03/03/2022 0503    AST 18 03/03/2022 0503    ALT 14 03/03/2022 0503    BILITOT 0.3  03/03/2022 0503    ESTGFRAFRICA >60 03/04/2022 0438    EGFRNONAA >60 03/04/2022 0438        Lab Results   Component Value Date    WBC 6.65 03/04/2022    HGB 8.5 (L) 03/04/2022    HCT 28.5 (L) 03/04/2022    MCV 79 (L) 03/04/2022     03/04/2022     Sinus rhythm with Premature supraventricular complexes   Incomplete right bundle branch block   Left anterior fascicular block   Minimal voltage criteria for LVH, may be normal variant ( Evans product )   Nonspecific ST and T wave abnormality   When compared with ECG of 17-JUN-2021 13:26,   Premature supraventricular complexes are now Present   Criteria for Anteroseptal infarct are no longer Present   Confirmed by RUSH LOZANO MD ([id number]) on 8/4/2021 5:17:49 AM       Echo 5/25/21:  · The left ventricle is normal in size with concentric hypertrophy and mildly decreased systolic function.  · The estimated ejection fraction is 40%.  · Grade I left ventricular diastolic dysfunction.  · Normal right ventricular size with normal right ventricular systolic function.  · Normal central venous pressure (3 mmHg).    Nuclear Stress 5/25/21:   Equivocal myocardial perfusion scan.    There is a mild intensity, small sized, fixed defect consistent with scar in the inferoapical wall(s).    The gated perfusion images showed an ejection fraction of 35% at rest. The gated perfusion images showed an ejection fraction of 39% post stress    There is mild global hypokinesis at rest and stress.         Physical Exam  General: Well nourished and Obesity    Airway:  Mallampati: II   Mouth Opening: Normal  TM Distance: 4 - 6 cm  Tongue: Normal  Neck ROM: Normal ROM    Dental:  In tact, Intact    Chest/Lungs:  Clear to auscultation, Normal Respiratory Rate    Heart:  Rate: Normal  Rhythm: Regular Rhythm  Sounds: Normal          Anesthesia Plan  Type of Anesthesia, risks & benefits discussed:    Anesthesia Type: MAC  Intra-op Monitoring Plan: Standard ASA Monitors and standard ASA  monitors  Post Op Pain Control Plan: multimodal analgesia and per primary service following discharge from PACU  Induction:  IV  Informed Consent: Informed consent signed with the Patient and all parties understand the risks and agree with anesthesia plan.  All questions answered.   ASA Score: 3  Day of Surgery Review of History & Physical: H&P Update referred to the surgeon/provider.I have interviewed and examined the patient. I have reviewed the patient's H&P dated: There are no significant changes.     Ready For Surgery From Anesthesia Perspective.       .

## 2022-03-04 NOTE — PLAN OF CARE
CM attempted to complete initial assessment. Patient not in room at this time, will attempt again later today.

## 2022-03-04 NOTE — PLAN OF CARE
Plan of care reviewed with patient. All questions answered til this point. No new questions as of now. Patient NPO after midnight for procedure. Will continue care per unit policy and patient needs.

## 2022-03-04 NOTE — SUBJECTIVE & OBJECTIVE
"Interval History:   S/P P-RBC x 1 unit. EGD and colonoscopy completed. No signs of GI bleeding. Will plan for outpatient video capsule to complete work-up. No further GI recommendations at this time      Review of Systems   Constitutional:  Positive for activity change and fatigue. Negative for chills, diaphoresis and fever.   HENT:  Negative for congestion, trouble swallowing and voice change.    Eyes:  Negative for photophobia and discharge.   Respiratory:  Negative for cough, chest tightness, shortness of breath and wheezing.    Cardiovascular:  Negative for chest pain, palpitations and leg swelling.   Gastrointestinal:  Positive for blood in stool and diarrhea ("black"). Negative for abdominal pain, constipation, nausea and vomiting.   Endocrine: Negative for cold intolerance and heat intolerance.   Genitourinary:  Negative for difficulty urinating, dysuria, flank pain, frequency and urgency.   Musculoskeletal:  Positive for arthralgias (R shoulder, wrist, BLE). Negative for back pain, joint swelling and myalgias.   Skin:  Negative for rash and wound.   Neurological:  Positive for dizziness and syncope. Negative for seizures, facial asymmetry, weakness, light-headedness, numbness and headaches.   Psychiatric/Behavioral:  Negative for confusion and hallucinations.    Objective:     Vital Signs (Most Recent):  Temp: 98.1 °F (36.7 °C) (03/04/22 1619)  Pulse: 73 (03/04/22 1619)  Resp: 18 (03/04/22 1619)  BP: 119/70 (03/04/22 1619)  SpO2: 95 % (03/04/22 1619) Vital Signs (24h Range):  Temp:  [97.5 °F (36.4 °C)-98.6 °F (37 °C)] 98.1 °F (36.7 °C)  Pulse:  [71-80] 73  Resp:  [16-20] 18  SpO2:  [95 %-99 %] 95 %  BP: (103-130)/(59-83) 119/70     Weight: 91 kg (200 lb 9.9 oz)  Body mass index is 33.38 kg/m².    Intake/Output Summary (Last 24 hours) at 3/4/2022 1621  Last data filed at 3/4/2022 0950  Gross per 24 hour   Intake 200 ml   Output --   Net 200 ml        Physical Exam  Vitals reviewed.   Constitutional:       " General: He is not in acute distress.     Appearance: Normal appearance. He is normal weight. He is ill-appearing. He is not toxic-appearing.   HENT:      Head: Normocephalic and atraumatic.      Nose: Nose normal. No congestion.      Mouth/Throat:      Mouth: Mucous membranes are moist.   Eyes:      Pupils: Pupils are equal, round, and reactive to light.   Cardiovascular:      Rate and Rhythm: Normal rate and regular rhythm.      Pulses: Normal pulses.      Heart sounds: Normal heart sounds.   Pulmonary:      Effort: Pulmonary effort is normal. No respiratory distress.      Breath sounds: Normal breath sounds. No wheezing or rhonchi.   Abdominal:      General: Abdomen is flat. Bowel sounds are normal. There is no distension.      Palpations: Abdomen is soft.      Tenderness: There is no abdominal tenderness. There is no rebound.   Musculoskeletal:         General: Deformity (L 1st toe amputation) present. Normal range of motion.      Cervical back: Normal range of motion and neck supple. No tenderness.   Skin:     General: Skin is warm and dry.      Capillary Refill: Capillary refill takes less than 2 seconds.      Findings: No bruising or erythema.   Neurological:      General: No focal deficit present.      Mental Status: He is alert and oriented to person, place, and time.      Sensory: No sensory deficit.      Motor: No weakness.   Psychiatric:         Mood and Affect: Mood normal.         Behavior: Behavior normal.         Thought Content: Thought content normal.         Judgment: Judgment normal.       Significant Labs: All pertinent labs within the past 24 hours have been reviewed.  BMP:   Recent Labs   Lab 03/04/22  0438   GLU 68*      K 3.9      CO2 29   BUN 16   CREATININE 1.0   CALCIUM 8.2*   MG 2.1       CBC:   Recent Labs   Lab 03/03/22  1556 03/04/22  0013 03/04/22  1135   WBC 5.16 6.65 6.86   HGB 9.1*  9.1* 8.5* 8.4*   HCT 30.3*  30.5* 28.5* 28.5*    219 193       CMP:   Recent  Labs   Lab 03/02/22  1835 03/03/22  0503 03/04/22  0438    141 140   K 3.7 4.1 3.9    105 105   CO2 27 27 29   GLU 75 92 68*   BUN 23 27* 16   CREATININE 1.7* 1.5* 1.0   CALCIUM 8.5* 7.8* 8.2*   PROT 6.8 6.8  --    ALBUMIN 3.1* 3.0*  --    BILITOT 0.2 0.3  --    ALKPHOS 68 72  --    AST 19 18  --    ALT 11 14  --    ANIONGAP 12 9 6*   EGFRNONAA 42* 49* >60       Cardiac Markers:   Recent Labs   Lab 03/02/22  1835   *         Significant Imaging:

## 2022-03-04 NOTE — ANESTHESIA POSTPROCEDURE EVALUATION
Anesthesia Post Evaluation    Patient: Crow Green    Procedure(s) Performed: Procedure(s) (LRB):  EGD (ESOPHAGOGASTRODUODENOSCOPY) (N/A)  COLONOSCOPY (N/A)    Final Anesthesia Type: MAC      Patient location during evaluation: GI PACU  Patient participation: Yes- Able to Participate  Level of consciousness: awake and alert  Post-procedure vital signs: reviewed and stable  Pain management: adequate  Airway patency: patent    PONV status at discharge: No PONV  Anesthetic complications: no      Cardiovascular status: blood pressure returned to baseline  Respiratory status: unassisted and spontaneous ventilation  Hydration status: euvolemic  Follow-up not needed.          Vitals Value Taken Time   /83 03/04/22 1024   Temp 36.4 °C (97.5 °F) 03/04/22 0954   Pulse 77 03/04/22 1024   Resp 17 03/04/22 1024   SpO2 97 % 03/04/22 1024         Event Time   Out of Recovery 03/04/2022 10:33:34         Pain/Raymond Score: Pain Rating Prior to Med Admin: 6 (3/3/2022  5:43 PM)  Raymond Score: 9 (3/4/2022 10:24 AM)

## 2022-03-04 NOTE — TRANSFER OF CARE
"Anesthesia Transfer of Care Note    Patient: Crow Green    Procedure(s) Performed: Procedure(s) (LRB):  EGD (ESOPHAGOGASTRODUODENOSCOPY) (N/A)  COLONOSCOPY (N/A)    Patient location: GI    Anesthesia Type: MAC    Transport from OR: Transported from OR on room air with adequate spontaneous ventilation    Post pain: adequate analgesia    Post assessment: no apparent anesthetic complications    Post vital signs: stable    Level of consciousness: sedated    Nausea/Vomiting: no nausea/vomiting    Complications: none    Transfer of care protocol was followed      Last vitals:   Visit Vitals  /79 (BP Location: Left arm, Patient Position: Sitting)   Pulse 74   Temp 36.7 °C (98.1 °F) (Skin)   Resp 18   Ht 5' 5" (1.651 m)   Wt 91 kg (200 lb 9.9 oz)   SpO2 98%   BMI 33.38 kg/m²     "

## 2022-03-04 NOTE — ASSESSMENT & PLAN NOTE
Stool for OCB positive in ED  Gastroenterology consulted  1 unit PRBCs transfused  Trend H&H, transfuse as indicated  Protonix 40mg IV BID  NPO in case of AM testing/procedure  Holding eliquis, ASA for now - resume as appropriate  3/3-  GI consult obtained and plan for EGD/Colonoscopy tomorrow  NPO after midnight     3/4-  EGD and colonoscopy completed. No signs of GI bleeding. Will plan for outpatient video capsule to complete work-up. No further GI recommendations at this time  Repeat CBC in AM

## 2022-03-04 NOTE — PLAN OF CARE
Patient resting in bed. Denies pain or distress. Plan of care discussed. Patient verbalized understanding. Bowel Prep in progress. Patient educated NPO at midnight. Verbalized understanding. Remains free of falls this shift. Will continue current treatment plan.

## 2022-03-04 NOTE — [employer] PATIENT INSTRUCTIONS
Discharge Summary/Instructions after an Endoscopic Procedure  Patient Name: Crow Green  Patient MRN: [id number]  Patient YOB: 1955 Friday, March 4, 2022 Yolis Freitas MD  Dear patient,  As a result of recent federal legislation (The Federal Cures Act), you may   receive lab or pathology results from your procedure in your [facility]   account before your physician is able to contact you. Your physician or   their representative will relay the results to you with their   recommendations at their soonest availability.  Thank you,  RESTRICTIONS:  During your procedure today, you received medications for sedation.  These   medications may affect your judgment, balance and coordination.  Therefore,   for 24 hours, you have the following restrictions:   - DO NOT drive a car, operate machinery, make legal/financial decisions,   sign important papers or drink alcohol.    ACTIVITY:  Today: no heavy lifting, straining or running due to procedural   sedation/anesthesia.  The following day: return to full activity including work.  DIET:  Eat and drink normally unless instructed otherwise.     TREATMENT FOR COMMON SIDE EFFECTS:  - Mild abdominal pain, nausea, belching, bloating or excessive gas:  rest,   eat lightly and use a heating pad.  - Sore Throat: treat with throat lozenges and/or gargle with warm salt   water.  - Because air was used during the procedure, expelling large amounts of air   from your rectum or belching is normal.  - If a bowel prep was taken, you may not have a bowel movement for 1-3 days.    This is normal.  SYMPTOMS TO WATCH FOR AND REPORT TO YOUR PHYSICIAN:  1. Abdominal pain or bloating, other than gas cramps.  2. Chest pain.  3. Back pain.  4. Signs of infection such as: chills or fever occurring within 24 hours   after the procedure.  5. Rectal bleeding, which would show as bright red, maroon, or black stools.   (A tablespoon of blood from the rectum is not serious, especially  if   hemorrhoids are present.)  6. Vomiting.  7. Weakness or dizziness.  GO DIRECTLY TO THE NEAREST EMERGENCY ROOM IF YOU HAVE ANY OF THE FOLLOWING:      Difficulty breathing              Chills and/or fever over 101 F   Persistent vomiting and/or vomiting blood   Severe abdominal pain   Severe chest pain   Black, tarry stools   Bleeding- more than one tablespoon   Any other symptom or condition that you feel may need urgent attention  Your doctor recommends these additional instructions:  If any biopsies were taken, your doctors clinic will contact you in 1 to 2   weeks with any results.  - Return patient to [facility] for ongoing care.   - Cardiac diet.   - Continue present medications.   - Patient has a contact number available for emergencies.  The signs and   symptoms of potential delayed complications were discussed with the   patient.  Return to normal activities tomorrow.  Written discharge   instructions were provided to the patient.  For questions, problems or results please call your physician Yolis Freitas MD at Work:  [phone]  If you have any questions about the above instructions, call the GI   department at [phone] or call the endoscopy unit at [phone]   from 7am until 3 pm.  [facility] [place], EMERGENCY ROOM PHONE NUMBER:   [phone]  IF A COMPLICATION OR EMERGENCY SITUATION ARISES AND YOU ARE UNABLE TO REACH   YOUR PHYSICIAN - GO DIRECTLY TO THE EMERGENCY ROOM.  I have read or have had read to me these discharge instructions for my   procedure and have received a written copy.  I understand these   instructions and will follow-up with my physician if I have any questions.     __________________________________       _____________________________________  Nurse Signature                                          Patient/Designated   Responsible Party Signature  MD Yolis Jin MD  3/4/2022 9:55:18 AM  [employer]

## 2022-03-04 NOTE — PROGRESS NOTES
EGD and colonoscopy completed. No signs of GI bleeding. Will plan for outpatient video capsule to complete work-up. No further GI recommendations at this time. Will sign off. Please call with any additional questions/concerns.    Yolis Freitas MD  Gastroenterology and Hepatology

## 2022-03-04 NOTE — PLAN OF CARE
[facility] (Hospital)  Initial Discharge Assessment       Primary Care Provider: Mateo Lambert DO    Admission Diagnosis: Acute blood loss anemia [D62]  Fall [W19.XXXA]  Occult blood positive stool [R19.5]  Bilateral hip pain [M25.551, M25.552]  Right shoulder pain [M25.511]  Symptomatic anemia [D64.9]    Admission Date: 3/2/2022  Expected Discharge Date:     Discharge Barriers Identified: None    Payor: MEDICAID / Plan: MEDICA[place] / Product Type: Government /     Extended Emergency Contact Information  Primary Emergency Contact: Gretchen Green  Mobile Phone: [phone]  Relation: Sister  Secondary Emergency Contact: Norma,Izetta  Address: [address]            [place] [address] [place] [facility]  Mobile Phone: [phone]  Relation: Relative    Discharge Plan A: Home with family         [id number] - [place] - [address] [place] [address]  [address] [place] [address]  Phone: [phone] Fax: [phone]    [facility] - [facility] [address]  [address] [facility] [address]  Phone: [phone] Fax: [phone]      Initial Assessment (most recent)     Adult Discharge Assessment - 03/04/22 1310        Discharge Assessment    Assessment Type Discharge Planning Assessment     Confirmed/corrected address, phone number and insurance Yes     Confirmed Demographics Correct on Facesheet     Source of Information patient     Communicated REJI with patient/caregiver Date not available/Unable to determine     Reason For Admission GI bleed     Lives With parent(s)     Facility Arrived From: home     Do you expect to return to your current living situation? Yes     Do you have help at home or someone to help you manage your care at home? --   patient is independent    Prior to hospitilization cognitive status: Alert/Oriented     Current cognitive status: Alert/Oriented     Walking or  Climbing Stairs Difficulty ambulation difficulty, requires equipment     Equipment Currently Used at Home cane, straight;rollator;wheelchair;oxygen   oxygen prn    Readmission within 30 days? No     Patient currently being followed by outpatient case management? No     Do you currently have service(s) that help you manage your care at home? No     Do you take prescription medications? Yes     Do you have prescription coverage? Yes     Do you have any problems affording any of your prescribed medications? No     Is the patient taking medications as prescribed? yes     How do you get to doctors appointments? health plan transportation     Are you on dialysis? No     Discharge Plan A Home with family     DME Needed Upon Discharge  none     Discharge Plan discussed with: Patient     Discharge Barriers Identified None                 Anticipated DC dispo: home with father  PLOF: patient utilizes cane/rollator. Has w/c but states someone is coming to repair  It. Shower chair and home O2 (PRN).  PCP: [facility]    CM following for needs.    CM provided a transitional care folder, information on advanced directives, information on pharmacy bedside delivery, and discharge planning begins on admission with contact information for any needs/questions.

## 2022-03-05 VITALS
BODY MASS INDEX: 34.01 KG/M2 | HEART RATE: 82 BPM | RESPIRATION RATE: 18 BRPM | OXYGEN SATURATION: 98 % | TEMPERATURE: 99 F | WEIGHT: 204.13 LBS | HEIGHT: 65 IN | SYSTOLIC BLOOD PRESSURE: 124 MMHG | DIASTOLIC BLOOD PRESSURE: 58 MMHG

## 2022-03-05 PROBLEM — D64.9 SYMPTOMATIC ANEMIA: Status: RESOLVED | Noted: 2021-09-16 | Resolved: 2022-03-05

## 2022-03-05 PROBLEM — R19.7 DIARRHEA: Status: RESOLVED | Noted: 2022-03-03 | Resolved: 2022-03-05

## 2022-03-05 PROBLEM — R79.89 ELEVATED TROPONIN: Status: RESOLVED | Noted: 2020-06-26 | Resolved: 2022-03-05

## 2022-03-05 PROBLEM — R55 SYNCOPE AND COLLAPSE: Status: RESOLVED | Noted: 2022-03-03 | Resolved: 2022-03-05

## 2022-03-05 PROBLEM — K92.2 GI BLEED: Status: RESOLVED | Noted: 2022-03-03 | Resolved: 2022-03-05

## 2022-03-05 LAB
BASOPHILS # BLD AUTO: 0 K/UL (ref 0–0.2)
BASOPHILS NFR BLD: 0 % (ref 0–1.9)
DIFFERENTIAL METHOD: ABNORMAL
EOSINOPHIL # BLD AUTO: 0.3 K/UL (ref 0–0.5)
EOSINOPHIL NFR BLD: 3.9 % (ref 0–8)
ERYTHROCYTE [DISTWIDTH] IN BLOOD BY AUTOMATED COUNT: 19.7 % (ref 11.5–14.5)
HCT VFR BLD AUTO: 27.5 % (ref 40–54)
HGB BLD-MCNC: 8.1 G/DL (ref 14–18)
HGB BLD-MCNC: 8.2 G/DL (ref 14–18)
IMM GRANULOCYTES # BLD AUTO: 0.02 K/UL (ref 0–0.04)
IMM GRANULOCYTES NFR BLD AUTO: 0.3 % (ref 0–0.5)
LYMPHOCYTES # BLD AUTO: 1.4 K/UL (ref 1–4.8)
LYMPHOCYTES NFR BLD: 20.3 % (ref 18–48)
MCH RBC QN AUTO: 23.6 PG (ref 27–31)
MCHC RBC AUTO-ENTMCNC: 29.8 G/DL (ref 32–36)
MCV RBC AUTO: 79 FL (ref 82–98)
MONOCYTES # BLD AUTO: 0.9 K/UL (ref 0.3–1)
MONOCYTES NFR BLD: 13.4 % (ref 4–15)
NEUTROPHILS # BLD AUTO: 4.2 K/UL (ref 1.8–7.7)
NEUTROPHILS NFR BLD: 62.1 % (ref 38–73)
NRBC BLD-RTO: 0 /100 WBC
O+P STL MICRO: NORMAL
PLATELET # BLD AUTO: 197 K/UL (ref 150–450)
PMV BLD AUTO: 10.9 FL (ref 9.2–12.9)
POCT GLUCOSE: 94 MG/DL (ref 70–110)
RBC # BLD AUTO: 3.47 M/UL (ref 4.6–6.2)
WBC # BLD AUTO: 6.74 K/UL (ref 3.9–12.7)

## 2022-03-05 PROCEDURE — 36415 COLL VENOUS BLD VENIPUNCTURE: CPT | Performed by: STUDENT IN AN ORGANIZED HEALTH CARE EDUCATION/TRAINING PROGRAM

## 2022-03-05 PROCEDURE — 63600175 PHARM REV CODE 636 W HCPCS: Performed by: NURSE PRACTITIONER

## 2022-03-05 PROCEDURE — 25000003 PHARM REV CODE 250: Performed by: NURSE PRACTITIONER

## 2022-03-05 PROCEDURE — 85025 COMPLETE CBC W/AUTO DIFF WBC: CPT | Performed by: STUDENT IN AN ORGANIZED HEALTH CARE EDUCATION/TRAINING PROGRAM

## 2022-03-05 PROCEDURE — 63600175 PHARM REV CODE 636 W HCPCS: Performed by: PHYSICIAN ASSISTANT

## 2022-03-05 PROCEDURE — 25000242 PHARM REV CODE 250 ALT 637 W/ HCPCS: Performed by: NURSE PRACTITIONER

## 2022-03-05 PROCEDURE — 25000003 PHARM REV CODE 250: Performed by: INTERNAL MEDICINE

## 2022-03-05 PROCEDURE — C9113 INJ PANTOPRAZOLE SODIUM, VIA: HCPCS | Performed by: NURSE PRACTITIONER

## 2022-03-05 PROCEDURE — 94761 N-INVAS EAR/PLS OXIMETRY MLT: CPT

## 2022-03-05 PROCEDURE — 85018 HEMOGLOBIN: CPT | Performed by: STUDENT IN AN ORGANIZED HEALTH CARE EDUCATION/TRAINING PROGRAM

## 2022-03-05 PROCEDURE — 96376 TX/PRO/DX INJ SAME DRUG ADON: CPT

## 2022-03-05 PROCEDURE — 94640 AIRWAY INHALATION TREATMENT: CPT

## 2022-03-05 PROCEDURE — G0378 HOSPITAL OBSERVATION PER HR: HCPCS

## 2022-03-05 PROCEDURE — 27000221 HC OXYGEN, UP TO 24 HOURS

## 2022-03-05 PROCEDURE — 99900035 HC TECH TIME PER 15 MIN (STAT)

## 2022-03-05 RX ADMIN — IPRATROPIUM BROMIDE AND ALBUTEROL SULFATE 3 ML: 2.5; .5 SOLUTION RESPIRATORY (INHALATION) at 07:03

## 2022-03-05 RX ADMIN — RANOLAZINE 500 MG: 500 TABLET, EXTENDED RELEASE ORAL at 10:03

## 2022-03-05 RX ADMIN — AMLODIPINE BESYLATE 2.5 MG: 2.5 TABLET ORAL at 10:03

## 2022-03-05 RX ADMIN — PANTOPRAZOLE SODIUM 40 MG: 40 INJECTION, POWDER, FOR SOLUTION INTRAVENOUS at 10:03

## 2022-03-05 RX ADMIN — TAMSULOSIN HYDROCHLORIDE 0.4 MG: 0.4 CAPSULE ORAL at 10:03

## 2022-03-05 RX ADMIN — BUDESONIDE 0.5 MG: 0.5 INHALANT ORAL at 07:03

## 2022-03-05 RX ADMIN — LATANOPROST 1 DROP: 50 SOLUTION/ DROPS OPHTHALMIC at 10:03

## 2022-03-05 RX ADMIN — IRON SUCROSE 200 MG: 20 INJECTION, SOLUTION INTRAVENOUS at 10:03

## 2022-03-05 RX ADMIN — Medication 1 TABLET: at 10:03

## 2022-03-05 NOTE — DISCHARGE SUMMARY
[facility] ([facility])  Hospital Medicine  Discharge Summary      Patient Name: Crow Green  MRN: [id number]  Patient Class: OP- Observation  Admission Date: 3/2/2022  Hospital Length of Stay: 0 days  Discharge Date and Time:  03/05/2022 8:19 AM  Attending Physician: Dakota Francis MD   Discharging Provider: Dakota Francis MD  Primary Care Provider: Mateo Lambert,       HPI:   67 y/o AA M with hx of CAD, combined HF, DM2, pAfib, anemia, B hip/shoulder pain, HTN, SANDRITA on CPAP, asthma, gout, GERD, glaucoma, arthritis, CKD 3, HLD, back pain, depression, DOMINIK, COPD to ED after experiencing a ground-level fall immediately PTA - states he did not hit is head but felt his knees go out from under him - is not sure if he had LOC - currently reporting pain to the R shoulder, wrist and BLE. Also reporting diarrhea for the past 4 days consisting of black stool. Symptoms were episodic and of moderate severity, without known mitigating or exacerbating factors. Denies chest pain, edema, palpitations, SOB, wheezing, abdominal pain, N/V/D, hematochezia, constipation, dysuria, flank pain, HA, weakness, fever, cough, chills. Vital signs stable in ED - pt was afebrile; WBCs 4.96, H&H 7.03&24.8, platelets 229, Na 140, K+ 3.7, BUN 23, creatinine 1.7, GFR 48, , troponin 0.113; EKG showed sinus arrythmia with 1st degree bundle branch block; xrays showed no acute fracture or dislocation; Stool for c.diff negative, stool for OCB positive; rotavirus negative, COVID-19 negative; CT of the head showed no acute findings; Cxray showed improvement over previous. In ED the pt was given norco, immodium and 1L IV fluids - remained stable throughout without re-occurrence of symptoms. Hospital medicine was called and the pt was placed in observation with telemetry monitoring. Pt is a full code - surrogate decision maker is his sister, Gretchen Green.       Procedure(s) (LRB):  EGD (ESOPHAGOGASTRODUODENOSCOPY)  (N/A)  COLONOSCOPY (N/A)      Hospital Course:   H/O PAF, CAD, combined CHF( EF 45%) on ASA/ Eliquis admitted for symptomatic anemia and melena. Hgb on admission was 7.3 with baseline 10 to 11. S/P 1 unit of P-RBC in the ED with repeat Hgb 8.3 this morning. Asa/Eliquis held and Pt started on IV PPI and GI consulted.     3/3- Monitor H/H. Continue PPI. Continue to hold ASA, Eliquis for now. GI consult obtained and plan for EGD/Colonoscopy tomorrow. BP meds were adjusted to BP trend. Monitor vitals.     3/4- NAEO. EGD and colonoscopy completed. No signs of GI bleeding. Will plan for outpatient video capsule to complete work-up. No further GI recommendations at this time. Expect stable for d/c in AM pending repeat CBC       Goals of Care Treatment Preferences:  Code Status: Full Code      Consults:   Consults (From admission, onward)        Status Ordering Provider     Inpatient consult to Gastroenterology  Once        Provider:  Franki Rojas MD    Completed JANNY SANTOS     Inpatient consult to Hospitalist  Once        Provider:  Dakota Francis MD    Acknowledged BROOKE OWEN.          * Symptomatic anemia-resolved as of 3/5/2022  1 unit PRBCs given  EGD and colonoscopy completed. No signs of GI bleeding. Will plan for outpatient video capsule to complete work-up    PAF (paroxysmal atrial fibrillation)  Rate controlled. Continue BB  Restart Eliquis and f/u PCP    Chronic obstructive pulmonary disease  No evidence of acute exacerbation  Supplemental O2 prn  Duonebs scheduled and prn  Pulmicort nebs BID      Type 2 diabetes mellitus  A1c at 6.7 on 12/02/2021 / BG in ED at 75  Accuchecks with SSI prn   Holding home jardiance, ozempic  Hold Levemir while NPO        Chronic combined systolic and diastolic congestive heart failure  Echo - 05/25/2021 - EF 40%, grade 1 diastolic dysfunction  Appears clinically compensated  Continue home medications  Cardiac diet  Strict I&Os  Daily weights  ECHO ordered on  admit     Essential hypertension  Currently well-controlled  Home medications reviewed and resumed as appropriate         Final Active Diagnoses:    Diagnosis Date Noted POA    PAF (paroxysmal atrial fibrillation) [I48.0] 03/17/2017 Yes     Chronic    Chronic obstructive pulmonary disease [J44.9] 08/05/2020 Yes    Hyperlipidemia associated with type 2 diabetes mellitus [E11.69, E78.5] 07/29/2019 Yes    H/O Asthma [J45.909] 01/12/2017 Yes    SANDRITA on CPAP [G47.33, Z99.89] 01/12/2017 Not Applicable    Type 2 diabetes mellitus [E11.9] 12/07/2016 Yes    Chronic combined systolic and diastolic congestive heart failure [I50.42] 12/05/2016 Yes    Nonobstructive coronary artery disease of native artery of native heart [I25.118] 12/05/2016 Yes     Chronic    Essential hypertension [I10] 02/27/2015 Yes      Problems Resolved During this Admission:    Diagnosis Date Noted Date Resolved POA    PRINCIPAL PROBLEM:  Symptomatic anemia [D64.9] 09/16/2021 03/05/2022 Yes    Syncope and collapse [R55] 03/03/2022 03/05/2022 Yes    GI bleed [K92.2] 03/03/2022 03/05/2022 Yes    Diarrhea [R19.7] 03/03/2022 03/05/2022 Yes    Elevated troponin [R77.8] 06/26/2020 03/05/2022 Yes       Discharged Condition: stable    Disposition: Home or Self Care    Follow Up:   Follow-up Information     Yolis Freitas MD. Schedule an appointment as soon as possible for a visit in 1 week(s).    Specialties: Gastroenterology, Internal Medicine  Contact information:  [address] [place] [address] [phone]             Mateo Lambert DO. Schedule an appointment as soon as possible for a visit in 1 week(s).    Specialty: Family Medicine  Contact information:  [address] [address] [place] [address] [phone]                       Patient Instructions:      Ambulatory referral/consult to [facility]   Standing Status: Future   Referral Priority: Routine Referral Type: Consultation   Referral Reason: Specialty  Services Required   Number of Visits Requested: 1     Diet diabetic     Activity as tolerated       Significant Diagnostic Studies: Labs:   BMP:   Recent Labs   Lab 03/04/22  0438   GLU 68*      K 3.9      CO2 29   BUN 16   CREATININE 1.0   CALCIUM 8.2*   MG 2.1    and CBC   Recent Labs   Lab 03/04/22  0013 03/04/22  1135 03/04/22  1628 03/05/22  0749   WBC 6.65 6.86 5.67  --    HGB 8.5* 8.4* 8.5* 8.1*   HCT 28.5* 28.5* 28.6*  --     193 191  --        Pending Diagnostic Studies:     Procedure Component Value Units Date/Time    CBC auto differential [[id number]] Collected: 03/05/22 0748    Order Status: Sent Lab Status: In process Updated: 03/05/22 0814    Specimen: Blood     Calprotectin, Stool [[id number]] Collected: 03/02/22 2050    Order Status: Sent Lab Status: In process Updated: 03/03/22 0232    Specimen: Stool     Fecal fat, qualitative [[id number]] Collected: 03/02/22 2050    Order Status: Sent Lab Status: In process Updated: 03/03/22 0232    Specimen: Stool     H. pylori antigen, stool [[id number]] Collected: 03/02/22 2050    Order Status: Sent Lab Status: In process Updated: 03/02/22 2051    Specimen: Stool     Stool Exam-Ova,Cysts,Parasites [[id number]] Collected: 03/02/22 2050    Order Status: Sent Lab Status: In process Updated: 03/03/22 0232    Specimen: Stool          Medications:  Reconciled Home Medications:      Medication List      CHANGE how you take these medications    HYDROcodone-acetaminophen 5-325 mg per tablet  Commonly known as: NORCO  Take 1 tablet by mouth every 6 (six) hours as needed for Pain.  What changed: Another medication with the same name was removed. Continue taking this medication, and follow the directions you see here.     insulin degludec 200 unit/mL (3 mL) insulin pen  Commonly known as: TRESIBA FLEXTOUCH U-200  Inject 40 Units into the skin once daily.  What changed: how much to take        CONTINUE taking these medications    acetaminophen 500 MG  tablet  Commonly known as: TYLENOL  Take 2 tablets (1,000 mg total) by mouth every 8 (eight) hours as needed for Pain.     albuterol 2.5 mg /3 mL (0.083 %) nebulizer solution  Commonly known as: PROVENTIL  Take 3 mLs (2.5 mg total) by nebulization every 4 to 6 hours as needed for Wheezing or Shortness of Breath.     allopurinoL 100 MG tablet  Commonly known as: ZYLOPRIM  Take 1 tablet (100 mg total) by mouth once daily.     amLODIPine 2.5 MG tablet  Commonly known as: NORVASC  Take 1 tablet (2.5 mg total) by mouth every evening.     atorvastatin 40 MG tablet  Commonly known as: LIPITOR  Take 1 tablet (40 mg total) by mouth every evening.     azaTHIOprine 50 mg Tab  Commonly known as: IMURAN  Take 1 tablet (50 mg total) by mouth once daily.     baclofen 10 MG tablet  Commonly known as: LIORESAL  TAKE 1 TABLET EVERY DAY     blood sugar diagnostic Strp  1 each by Misc.(Non-Drug; Combo Route) route 4 (four) times daily.     blood-glucose meter kit  Commonly known as: TRUE METRIX GLUCOSE METER  Use as instructed to test blood glucose meter daily.     colchicine 0.6 mg tablet  Commonly known as: COLCRYS  TAKE 1 TABLET (0.6 MG TOTAL) BY MOUTH ONCE DAILY.     ELIQUIS 5 mg Tab  Generic drug: apixaban  TAKE 1 TABLET TWICE DAILY     empagliflozin 25 mg tablet  Commonly known as: JARDIANCE  Take 1 tablet (25 mg total) by mouth once daily.     famotidine 20 MG tablet  Commonly known as: PEPCID  Take 1 tablet (20 mg total) by mouth 2 (two) times daily.     fluticasone-salmeterol 250-50 mcg/dose 250-50 mcg/dose diskus inhaler  Commonly known as: ADVAIR DISKUS  Inhale 1 puff into the lungs 2 (two) times daily. Controller     gabapentin 800 MG tablet  Commonly known as: NEURONTIN  Take 1 tablet (800 mg total) by mouth 3 (three) times daily.     insulin aspart U-100 100 unit/mL (3 mL) Inpn pen  Commonly known as: NovoLOG Flexpen U-100 Insulin  INJECT 10 UNITS SUBCUTANEOUSLY THREE TIMES DAILY WITH MEALS     ipratropium 0.02 %  nebulizer solution  Commonly known as: ATROVENT  INHALE THE CONTENTS OF 1 VIAL VIA NEBULIZER FOUR TIMES DAILY     isosorbide mononitrate 60 MG 24 hr tablet  Commonly known as: IMDUR  Take 1 tablet (60 mg total) by mouth once daily.     lancets Misc  1 each by Misc.(Non-Drug; Combo Route) route 4 (four) times daily.     latanoprost 0.005 % ophthalmic solution  INSTILL 1 DROP INTO BOTH EYES ONE TIME DAILY     lisinopriL 30 MG tablet  Commonly known as: PRINIVIL,ZESTRIL  Take 1 tablet by mouth once daily.     metoprolol succinate 50 MG 24 hr tablet  Commonly known as: TOPROL-XL  Take 1 tablet (50 mg total) by mouth every evening.     * OZEMPIC 1 mg/dose (2 mg/1.5 mL) Pnij  Generic drug: semaglutide  Inject 0.75 mLs (1 mg total) into the skin once a week.     * OZEMPIC 1 mg/dose (4 mg/3 mL)  Generic drug: semaglutide  INJECT 1MG SUBCUTANEOUSLY ONE TIME WEEKLY     pantoprazole 40 MG tablet  Commonly known as: PROTONIX  Take 1 tablet (40 mg total) by mouth once daily.     ranolazine 500 MG Tb12  Commonly known as: RANEXA  Take 1 tablet (500 mg total) by mouth 2 (two) times daily.     sertraline 100 MG tablet  Commonly known as: ZOLOFT  Take 1 tablet (100 mg total) by mouth every evening.     SPIRIVA WITH HANDIHALER 18 mcg inhalation capsule  Generic drug: tiotropium  INHALE THE CONTENTS OF 1 CAPSULE ONE TIME DAILY (CONTROLLER)     tacrolimus 0.1 % ointment  Commonly known as: PROTOPIC  Apply topically 2 (two) times daily.     tamsulosin 0.4 mg Cap  Commonly known as: FLOMAX  Take 1 capsule (0.4 mg total) by mouth once daily.     traMADoL 50 mg tablet  Commonly known as: ULTRAM  Take 1 tablet (50 mg total) by mouth every 4 (four) hours as needed for Pain.     traZODone 100 MG tablet  Commonly known as: DESYREL  Take 2 tablets (200 mg total) by mouth every evening.     triamcinolone acetonide 0.1% 0.1 % ointment  Commonly known as: KENALOG  Apply topically 2 (two) times daily. Use on legs and back     VIOS AEROSOL DELIVERY  SYSTEM Shef[employer]  Generic drug: nebulizer and compressor  USE AS DIRESTED         * This list has 2 medication(s) that are the same as other medications prescribed for you. Read the directions carefully, and ask your doctor or other care provider to review them with you.            STOP taking these medications    aspirin 81 MG EC tablet  Commonly known as: ECOTRIN     DEPO-MedroL 40 mg/mL injection  Generic drug: methylPREDNISolone acetate     diclofenac sodium 1 % Gel  Commonly known as: VOLTAREN     fentaNYL 50 mcg/mL injection  Commonly known as: SUBLIMAZE     food supplemt, lactose-reduced Liqd  Commonly known as: ENSURE     furosemide 40 MG tablet  Commonly known as: LASIX     heparin (porcine) in 5 % dex 25,000 unit/250 mL(100 unit/mL) infusion     magnesium oxide 400 mg (241.3 mg magnesium) tablet  Commonly known as: MAG-OX     midazolam 1 mg/mL injection  Commonly known as: VERSED     naproxen 500 MG EC tablet  Commonly known as: EC NAPROSYN     nitroGLYCERIN 0.4 MG SL tablet  Commonly known as: NITROSTAT     OMNIPAQUE 240 240 mg iodine/mL injection  Generic drug: iohexoL            Indwelling Lines/Drains at time of discharge:   Lines/Drains/Airways     None                 Time spent on the discharge of patient: 40 minutes         Dakota Francis MD  Department of Hospital Medicine  [facility] ([facility])

## 2022-03-05 NOTE — NURSING
Discharge papers explained and given to pt.  Pt verbalizes understanding. All questions answered to patient's satisfaction.  Pt with no complaints.  IV removed.  Bleeding controlled.  Telemetry removed and returned to monitor tech.  Pt awaiting ride before discharging.   Messages sent to doctors office for f/u appointments per care management.

## 2022-03-05 NOTE — PLAN OF CARE
[facility] (Hospital)  Discharge Final Note    Primary Care Provider: Mateo Lambert DO    Expected Discharge Date: 3/5/2022    Final Discharge Note (most recent)     Final Note - 03/05/22 1116        Final Note    Assessment Type Final Discharge Note     Anticipated Discharge Disposition Home or Self Care        Post-Acute Status    Discharge Delays None known at this time                 Important Message from Medicare             Contact Info     Yolis Freitas MD   Specialty: Gastroenterology, Internal Medicine    [address] [place] [address] [address] [place] [address]   Phone: [phone]       Next Steps: Schedule an appointment as soon as possible for a visit in 1 week(s)    Instructions: Note sent to Dr Newsome office to call you with date and time of hospital follow appointment for in 1 week.    Mateo Lambert DO   Specialty: Family Medicine   Relationship: PCP - General    [address] [place] [address]   Phone: [phone]       Next Steps: Schedule an appointment as soon as possible for a visit in 1 week(s)    Instructions: Note sent to Dr Lambert office to call you with date and time of hospital follow up appointment for in 1 week        Pt has no discharge needs at the time of chart review.

## 2022-03-05 NOTE — ASSESSMENT & PLAN NOTE
1 unit PRBCs given  EGD and colonoscopy completed. No signs of GI bleeding. Will plan for outpatient video capsule to complete work-up

## 2022-03-05 NOTE — PLAN OF CARE
Plan of care reviewed with patient.  All questions answered til this point. No new questions as of now. Will continue care per unit policy and patient needs.

## 2022-03-05 NOTE — PLAN OF CARE
Pt Awake, alert and oriented   Vital signs stable  Pt remained afebrile throughout this shift.   Pt remained free of falls this shift.   Pt denies pain this shift.  Pt moving/turing when needed. Frequent weight shifting encouraged.  Patient continue on tele monitor.   Bed low, side rails up x 2, wheels locked, call light in reach.   Patient instructed to call for assistance.   Hourly rounding completed.   Will continue to monitor.

## 2022-03-07 ENCOUNTER — TELEPHONE (OUTPATIENT)
Dept: GASTROENTEROLOGY | Facility: CLINIC | Age: 67
End: 2022-03-07
Payer: MEDICARE

## 2022-03-07 ENCOUNTER — TELEPHONE (OUTPATIENT)
Dept: INTERNAL MEDICINE | Facility: CLINIC | Age: 67
End: 2022-03-07
Payer: MEDICARE

## 2022-03-07 LAB
BACTERIA STL CULT: NORMAL
FAT STL QL: NORMAL
NEUTRAL FAT STL QL: NORMAL

## 2022-03-07 NOTE — TELEPHONE ENCOUNTER
Telephone call to pt to schedule follow-up appt, pt states that he will return call to office when he's available.

## 2022-03-07 NOTE — TELEPHONE ENCOUNTER
----- Message from Shannan Douglas RN sent at 3/5/2022  9:27 AM CST -----  Regarding: Hospital Follow up appointment for in 1 week  Please call patient with date and time of Hospital Follow up appointment for in 1 week.    Thank You

## 2022-03-07 NOTE — TELEPHONE ENCOUNTER
Patient has been schedule and is aware of appointment date, day and time. Reminder will also be sent to patient

## 2022-03-07 NOTE — TELEPHONE ENCOUNTER
----- Message from Shannan Douglas RN sent at 3/5/2022  9:28 AM CST -----  Regarding: Hospital follow up appointment for in 1 week  Please call patient with date and time of Hospital Follow up appointment for in 1 week.    Thank You

## 2022-03-08 ENCOUNTER — TELEPHONE (OUTPATIENT)
Dept: DIABETES | Facility: CLINIC | Age: 67
End: 2022-03-08
Payer: MEDICARE

## 2022-03-08 ENCOUNTER — CLINICAL SUPPORT (OUTPATIENT)
Dept: REHABILITATION | Facility: HOSPITAL | Age: 67
End: 2022-03-08
Payer: MEDICARE

## 2022-03-08 DIAGNOSIS — M25.619 DECREASED RANGE OF MOTION OF SHOULDER, UNSPECIFIED LATERALITY: ICD-10-CM

## 2022-03-08 DIAGNOSIS — R29.898 ARM WEAKNESS: Primary | ICD-10-CM

## 2022-03-08 LAB — CALPROTECTIN STL-MCNT: <27.1 MCG/G

## 2022-03-08 PROCEDURE — 97140 MANUAL THERAPY 1/> REGIONS: CPT

## 2022-03-08 PROCEDURE — 97110 THERAPEUTIC EXERCISES: CPT

## 2022-03-08 NOTE — TELEPHONE ENCOUNTER
Called pt regarding problems with Dexcom   He did not get return call last week as he was in the hospital.   He needs assistance with Dexcom   Scheduled appt for next week.

## 2022-03-08 NOTE — PROGRESS NOTES
[facility]   Physical Therapy Treatment Note     Name: Crow FISCHER [facility] Number: [id number]    Therapy Diagnosis:   Encounter Diagnoses   Name Primary?    Arm weakness Yes    Decreased range of motion of shoulder, unspecified laterality      Physician: Anthony Alvarado    Visit Date: 3/8/2022      Physician Orders: PT Eval and Treat   Medical Diagnosis from Referral:   M75.102,M12.812 (ICD-10-CM) - Rotator cuff tear arthropathy of left shoulder   Z96.612 (ICD-10-CM) - S/P reverse total shoulder arthroplasty, left      Evaluation Date: 2/3/2022  Authorization Period Expiration: 4/6/2022  Plan of Care Expiration: 4/3/2022  Progress Note Due: 3/3/2022  Visit # / Visits authorized: 6/20 (+1 eval)   FOTO: 1/3    PTA Visit #: 0/5     Precautions: Standard, Diabetes and Fall     Surgery: S/P Left Reverse Total Shoulder - 1/10/2022    Time In: 9:45am  Time Out: 10:30am  Total Billable Time: 42 minutes (Billing reflects 1 on 1 treatment time spent with patient)     SUBJECTIVE     Patient reports: He fell last week and spent most of the week in the hospital. He states they did run a bunch of test on him but nothing was hurt. He states he fell on his left side so his right shoulder wasn't hurt.   He was compliant with home exercise program with pain.  Response to previous treatment: felt pretty good yesterday.   Functional change: decreased sleep, unable to cook, clean home and hard to shop for groceries    Pain: 10/10     Location: anterior left shoulder joint    OBJECTIVE     Objective Measures updated at progress report unless specified.     Left Shoulder ROM: Flexion-135, Abduction-135, External Rotation- 90, Internal Rotation-45    TREATMENT     Crow received the treatments listed below:       MANUAL THERAPY TECHNIQUES were applied for (12) minutes, including:    Manual Intervention Performed Today    Soft Tissue Mobilization  left supraspinatus , infraspinatus , teres  major and minor , subscapularis  and deltoid   Joint Mobilizations     Mobilization with movement x PROM Flexion, Abduction, and Rotation    Subscapular release  Left   Functional Dry Needling        Plan for Next Visit:      All passive range was performed to his tolerance in supine.     THERAPEUTIC EXERCISES to develop strength, endurance, ROM and flexibility for (30) minutes including:    Intervention Performed Today    Pendulums   2 minutes  X 2 each   Bicep Curls x 3 minutes    Table slides    3  minutes Flexion  3 minutes Scaption   Scap Retractions x 3x10 red band   Shoulder Isometrics x [id number] all direction   Pulleys Flexion and Abduction  x 3 minutes each    Wand Flexion and Abduction  x 3 minutes each   Shoulder Flexion x 2 minutes     Plan for Next Visit:        PATIENT EDUCATION AND HOME EXERCISES     Home Exercises Provided and Patient Education Provided     Education provided: (during session) minutes   Patient educated on biomechanical justification for therapeutic exercise and importance of compliance with HEP in order to improve overall impairments and QOL    Patient was educated on all the above exercise prior/during/after for proper posture, positioning, and execution for safe performance with home exercise program.     Written Home Exercises Provided: yes.  Exercises were reviewed and Crow was able to demonstrate them prior to the end of the session.  Crow demonstrated good  understanding of the education provided. See EMR under Patient Instructions for exercises provided during therapy sessions.      ASSESSMENT     Patient was stiffer today but was still able to get good range of motion passively. He was advised to work on his AAROM at home more to start working on muscle control and muscle activation. Overall, he tolerated today's treatment well.     Crow is progressing well towards his goals.   Pt prognosis is Fair.     Pt will continue to benefit from skilled outpatient physical  therapy to address the deficits listed in the problem list box on initial evaluation, provide pt/family education and to maximize pt's level of independence in the home and community environment.     Pt's spiritual, cultural and educational needs considered and pt agreeable to plan of care and goals.       Anticipated Barriers for therapy: chronic shoulder pain, prior inconsistency with therapy       Goals:  Short Term Goals: 4 weeks   1. Patient will improve shoulder flexion to 135 degrees in order to come out of sling.  2. Patient will improve shoulder abduction to 115 degrees in order to start light active range.  3. Patient will be independent with HEP in order to further progress and return to maximal function.  4. Pain rating at Worst: 5/10 in order to progress towards increased independence with activity.  5. Patient will improve postural awareness.      Long Term Goals: 8 weeks   1. Patient will improve shoulder strength to 4-/5 in order to perform ADLs with left arm.  2. Patient will improve elbow strength to 4/5 in order to hold small items.  3. Patient will improve shoulder flexion to 150 degrees in order to reach overhead.   4. Patient will improve shoulder abduciton to 145 degrees in order to reach into cabinets.   5. Patient will self report improvement to 40% limitation on the FOTO Shoulder Survey.    Goals Key:  PC= progressing/continue; PM= partially met;        DC= discontinue    PLAN     Continue Plan of Care (POC) and progress per patient tolerance. See treatment section for details on planned progressions next session.      Rupert Sanchez, PT, DPT

## 2022-03-09 ENCOUNTER — TELEPHONE (OUTPATIENT)
Dept: ORTHOPEDICS | Facility: CLINIC | Age: 67
End: 2022-03-09
Payer: MEDICARE

## 2022-03-09 ENCOUNTER — OFFICE VISIT (OUTPATIENT)
Dept: PODIATRY | Facility: CLINIC | Age: 67
End: 2022-03-09
Payer: MEDICARE

## 2022-03-09 VITALS
HEIGHT: 65 IN | SYSTOLIC BLOOD PRESSURE: 166 MMHG | DIASTOLIC BLOOD PRESSURE: 83 MMHG | BODY MASS INDEX: 34.01 KG/M2 | WEIGHT: 204.13 LBS | HEART RATE: 91 BPM

## 2022-03-09 DIAGNOSIS — L84 PRE-ULCERATIVE CALLUSES: ICD-10-CM

## 2022-03-09 DIAGNOSIS — B35.1 DERMATOPHYTOSIS OF NAIL: ICD-10-CM

## 2022-03-09 DIAGNOSIS — E11.49 TYPE II DIABETES MELLITUS WITH NEUROLOGICAL MANIFESTATIONS: Primary | ICD-10-CM

## 2022-03-09 PROCEDURE — 3044F PR MOST RECENT HEMOGLOBIN A1C LEVEL <7.0%: ICD-10-PCS | Mod: CPTII,S$GLB,, | Performed by: PODIATRIST

## 2022-03-09 PROCEDURE — 3077F SYST BP >= 140 MM HG: CPT | Mod: CPTII,S$GLB,, | Performed by: PODIATRIST

## 2022-03-09 PROCEDURE — 99499 UNLISTED E&M SERVICE: CPT | Mod: S$GLB,,, | Performed by: PODIATRIST

## 2022-03-09 PROCEDURE — 11721 PR DEBRIDEMENT OF NAILS, 6 OR MORE: ICD-10-PCS | Mod: 59,Q7,S$GLB, | Performed by: PODIATRIST

## 2022-03-09 PROCEDURE — 3079F DIAST BP 80-89 MM HG: CPT | Mod: CPTII,S$GLB,, | Performed by: PODIATRIST

## 2022-03-09 PROCEDURE — 99999 PR PBB SHADOW E&M-EST. PATIENT-LVL V: ICD-10-PCS | Mod: PBBFAC,,, | Performed by: PODIATRIST

## 2022-03-09 PROCEDURE — 3079F PR MOST RECENT DIASTOLIC BLOOD PRESSURE 80-89 MM HG: ICD-10-PCS | Mod: CPTII,S$GLB,, | Performed by: PODIATRIST

## 2022-03-09 PROCEDURE — 99999 PR PBB SHADOW E&M-EST. PATIENT-LVL V: CPT | Mod: PBBFAC,,, | Performed by: PODIATRIST

## 2022-03-09 PROCEDURE — 3077F PR MOST RECENT SYSTOLIC BLOOD PRESSURE >= 140 MM HG: ICD-10-PCS | Mod: CPTII,S$GLB,, | Performed by: PODIATRIST

## 2022-03-09 PROCEDURE — 4010F ACE/ARB THERAPY RXD/TAKEN: CPT | Mod: CPTII,S$GLB,, | Performed by: PODIATRIST

## 2022-03-09 PROCEDURE — 11721 DEBRIDE NAIL 6 OR MORE: CPT | Mod: 59,Q7,S$GLB, | Performed by: PODIATRIST

## 2022-03-09 PROCEDURE — 4010F PR ACE/ARB THEARPY RXD/TAKEN: ICD-10-PCS | Mod: CPTII,S$GLB,, | Performed by: PODIATRIST

## 2022-03-09 PROCEDURE — 11055 PR TRIM HYPERKERATOTIC SKIN LESION, ONE: ICD-10-PCS | Mod: Q7,S$GLB,, | Performed by: PODIATRIST

## 2022-03-09 PROCEDURE — 11055 PARING/CUTG B9 HYPRKER LES 1: CPT | Mod: Q7,S$GLB,, | Performed by: PODIATRIST

## 2022-03-09 PROCEDURE — 3044F HG A1C LEVEL LT 7.0%: CPT | Mod: CPTII,S$GLB,, | Performed by: PODIATRIST

## 2022-03-09 PROCEDURE — 3008F PR BODY MASS INDEX (BMI) DOCUMENTED: ICD-10-PCS | Mod: CPTII,S$GLB,, | Performed by: PODIATRIST

## 2022-03-09 PROCEDURE — 99499 NO LOS: ICD-10-PCS | Mod: S$GLB,,, | Performed by: PODIATRIST

## 2022-03-09 PROCEDURE — 3008F BODY MASS INDEX DOCD: CPT | Mod: CPTII,S$GLB,, | Performed by: PODIATRIST

## 2022-03-09 NOTE — TELEPHONE ENCOUNTER
"Spoke w/ patient in regards to his request for norco pain medication. Expressed to patient that at his last visit he and Dr. Alvarado discussed that since he was two months out from surgery Dr. Alvarado wanted to transition him to tramadol instead. At the time of visit patient agreed. Expressed to patient that the tramadol was at his pharmacy and ready for . Patient has called and shown up to clinic multiple times asking for norco(hyrdocodone-acetaminophen 5mg)  and each time patient is made aware that as discussed in his last visit he would be prescribed tramadol only. Patient stated " Tell Dr. Alvarado that he can cancel me and I will find another doctor." Patient then hung up phone. -DD    ----- Message from Abi Mayen MA sent at 3/8/2022  4:57 PM CST -----    ----- Message -----  From: Celia Valdez  Sent: 3/8/2022   3:16 PM CST  To: [facility] Staff    Patient would like a call back at [phone]  / [phone] in regards to getting refill of his medication HYDROcodone-acetaminophen (NORCO) 5-325 mg per tablet. he states that he has been reaching out to get the refill but the pharmacy [facility] not get the medication.      [id number] - [place] - [address] AT [facility] [address]  [address] [address]  Phone: [phone] Fax: [phone]    Thanks         "

## 2022-03-09 NOTE — PROGRESS NOTES
Subjective:     Patient ID: Crow Green is a 66 y.o. male.    Chief Complaint: Nail Care (Diabetic pt wears boots w/ socks, PCP Dr. Lambert last seen 2-15-22)    Crow is a 66 y.o. male who presents to the clinic for evaluation and treatment of high risk feet. Crow has a past medical history of Arthritis, Asthma, Atrial fibrillation, Atrial fibrillation with RVR (8/7/2019), CHF (congestive heart failure), Chronic obstructive pulmonary disease (8/5/2020), Depression, Dermatomyositis, Diabetes mellitus, type 2 (Diagnosed in 2000), Elevated PSA, Follow up (7/30/2020), Hypertension, Myocardial infarction, and Sleep apnea. The patient's chief complaint is long, thick toenails. This patient has documented high risk feet requiring routine maintenance secondary to diabetes mellitis and those secondary complications of diabetes, as mentioned.     PCP: Mateo Lambert, DO    Date Last Seen by PCP: 02/15/2022    Current shoe gear:  Affected Foot: Slip-on shoes     Unaffected Foot: Slip-on shoes    Hemoglobin A1C   Date Value Ref Range Status   03/03/2022 6.1 (H) 4.0 - 5.6 % Final     Comment:     ADA Screening Guidelines:  5.7-6.4%  Consistent with prediabetes  >or=6.5%  Consistent with diabetes    High levels of fetal hemoglobin interfere with the HbA1C  assay. Heterozygous hemoglobin variants (HbS, HgC, etc)do  not significantly interfere with this assay.   However, presence of multiple variants may affect accuracy.     12/02/2021 6.7 (H) 4.0 - 5.6 % Final     Comment:     ADA Screening Guidelines:  5.7-6.4%  Consistent with prediabetes  >or=6.5%  Consistent with diabetes    High levels of fetal hemoglobin interfere with the HbA1C  assay. Heterozygous hemoglobin variants (HbS, HgC, etc)do  not significantly interfere with this assay.   However, presence of multiple variants may affect accuracy.     07/08/2021 6.7 (H) 4.0 - 5.6 % Final     Comment:     ADA Screening Guidelines:  5.7-6.4%  Consistent with  prediabetes  >or=6.5%  Consistent with diabetes    High levels of fetal hemoglobin interfere with the HbA1C  assay. Heterozygous hemoglobin variants (HbS, HgC, etc)do  not significantly interfere with this assay.   However, presence of multiple variants may affect accuracy.         Patient Active Problem List   Diagnosis    ED (erectile dysfunction)    Non-proliferative diabetic retinopathy, mild, both eyes    Essential hypertension    Diabetic polyneuropathy    Nonobstructive coronary artery disease of native artery of native heart    Chronic combined systolic and diastolic congestive heart failure    Type 2 diabetes mellitus    SANDRITA on CPAP    H/O Asthma    Psychophysiological insomnia    Nightmares    Sleep related gastroesophageal reflux disease    Inadequate sleep hygiene    PAF (paroxysmal atrial fibrillation)    At risk for medication noncompliance    Obesity (BMI 30.0-34.9)    Indeterminate stage chronic open angle glaucoma    Right hip pain    Preop cardiovascular exam    Gait instability    Chronic right shoulder pain    Arthritis    Left sided sciatica    Dyspnea on exertion    Osteoarthritis of spine with radiculopathy, lumbar region    HNP (herniated nucleus pulposus), lumbar    Gastroesophageal reflux disease without esophagitis    Chronic diastolic heart failure    Hypogonadism in male    Disorder of parathyroid gland    Hyperlipidemia associated with type 2 diabetes mellitus    LRTI (lower respiratory tract infection)    Traumatic tear of left rotator cuff    Other chronic pain    Left shoulder pain    Complete tear of left rotator cuff    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 associated with type 2 diabetes mellitus    Lumbar radiculopathy    Diabetic ulcer of toe of left foot associated with type 2 diabetes mellitus, with necrosis of muscle    Ulcer of left foot    Hypotension due to medication    Dermatomyositis     Postprocedural hypotension    Advanced directives, counseling/discussion    Cocaine abuse with intoxication    Schizoaffective disorder, depressive type    Chronic obstructive pulmonary disease    MDD (major depressive disorder), recurrent episode, moderate    DOMINIK (generalized anxiety disorder)    Bilateral carpal tunnel syndrome    Rotator cuff tear arthropathy of left shoulder    Sacroiliac dysfunction    Atherosclerosis of native coronary artery of native heart with unstable angina pectoris    Coronary vasospasm    Cocaine abuse    Osteopenia    Atherosclerosis of aorta    Cardiac asthma    Pre-operative respiratory examination    Arm weakness    Decreased range of motion (ROM) of shoulder       Medication List with Changes/Refills   Current Medications    ACETAMINOPHEN (TYLENOL) 500 MG TABLET    Take 2 tablets (1,000 mg total) by mouth every 8 (eight) hours as needed for Pain.    ALBUTEROL (PROVENTIL) 2.5 MG /3 ML (0.083 %) NEBULIZER SOLUTION    Take 3 mLs (2.5 mg total) by nebulization every 4 to 6 hours as needed for Wheezing or Shortness of Breath.    ALLOPURINOL (ZYLOPRIM) 100 MG TABLET    Take 1 tablet (100 mg total) by mouth once daily.    AMLODIPINE (NORVASC) 2.5 MG TABLET    Take 1 tablet (2.5 mg total) by mouth every evening.    ATORVASTATIN (LIPITOR) 40 MG TABLET    Take 1 tablet (40 mg total) by mouth every evening.    AZATHIOPRINE (IMURAN) 50 MG TAB    Take 1 tablet (50 mg total) by mouth once daily.    BACLOFEN (LIORESAL) 10 MG TABLET    TAKE 1 TABLET EVERY DAY    BLOOD SUGAR DIAGNOSTIC STRP    1 each by Misc.(Non-Drug; Combo Route) route 4 (four) times daily.    BLOOD-GLUCOSE METER (TRUE METRIX GLUCOSE METER) KIT    Use as instructed to test blood glucose meter daily.    COLCHICINE (COLCRYS) 0.6 MG TABLET    TAKE 1 TABLET (0.6 MG TOTAL) BY MOUTH ONCE DAILY.    ELIQUIS 5 MG TAB    TAKE 1 TABLET TWICE DAILY    EMPAGLIFLOZIN (JARDIANCE) 25 MG TABLET    Take 1 tablet (25 mg total) by  mouth once daily.    FAMOTIDINE (PEPCID) 20 MG TABLET    Take 1 tablet (20 mg total) by mouth 2 (two) times daily.    FLUTICASONE-SALMETEROL DISKUS INHALER 250-50 MCG    Inhale 1 puff into the lungs 2 (two) times daily. Controller    GABAPENTIN (NEURONTIN) 800 MG TABLET    Take 1 tablet (800 mg total) by mouth 3 (three) times daily.    INSULIN ASPART U-100 (NOVOLOG FLEXPEN U-100 INSULIN) 100 UNIT/ML (3 ML) INPN PEN    INJECT 10 UNITS SUBCUTANEOUSLY THREE TIMES DAILY WITH MEALS    INSULIN DEGLUDEC (TRESIBA FLEXTOUCH U-200) 200 UNIT/ML (3 ML) INSULIN PEN    Inject 40 Units into the skin once daily.    IPRATROPIUM (ATROVENT) 0.02 % NEBULIZER SOLUTION    INHALE THE CONTENTS OF 1 VIAL VIA NEBULIZER FOUR TIMES DAILY    ISOSORBIDE MONONITRATE (IMDUR) 60 MG 24 HR TABLET    Take 1 tablet (60 mg total) by mouth once daily.    LANCETS MISC    1 each by Misc.(Non-Drug; Combo Route) route 4 (four) times daily.    LATANOPROST 0.005 % OPHTHALMIC SOLUTION    INSTILL 1 DROP INTO BOTH EYES ONE TIME DAILY    LISINOPRIL (PRINIVIL,ZESTRIL) 30 MG TABLET    Take 1 tablet by mouth once daily.    METOPROLOL SUCCINATE (TOPROL-XL) 50 MG 24 HR TABLET    Take 1 tablet (50 mg total) by mouth every evening.    OZEMPIC 1 MG/DOSE (2 MG/1.5 ML) PNIJ    Inject 0.75 mLs (1 mg total) into the skin once a week.    OZEMPIC 1 MG/DOSE (4 MG/3 ML)    INJECT 1MG SUBCUTANEOUSLY ONE TIME WEEKLY    PANTOPRAZOLE (PROTONIX) 40 MG TABLET    Take 1 tablet (40 mg total) by mouth once daily.    RANOLAZINE (RANEXA) 500 MG TB12    Take 1 tablet (500 mg total) by mouth 2 (two) times daily.    SERTRALINE (ZOLOFT) 100 MG TABLET    Take 1 tablet (100 mg total) by mouth every evening.    TACROLIMUS (PROTOPIC) 0.1 % OINTMENT    Apply topically 2 (two) times daily.    TAMSULOSIN (FLOMAX) 0.4 MG CAP    Take 1 capsule (0.4 mg total) by mouth once daily.    TIOTROPIUM (SPIRIVA WITH HANDIHALER) 18 MCG INHALATION CAPSULE    INHALE THE CONTENTS OF 1 CAPSULE ONE TIME DAILY  (CONTROLLER)    TRAZODONE (DESYREL) 100 MG TABLET    Take 2 tablets (200 mg total) by mouth every evening.    TRIAMCINOLONE ACETONIDE 0.1% (KENALOG) 0.1 % OINTMENT    Apply topically 2 (two) times daily. Use on legs and back    VIOS AEROSOL DELIVERY SYSTEM VIN    USE AS DIRESTED   Changed and/or Refilled Medications    Modified Medication Previous Medication    TRAMADOL (ULTRAM) 50 MG TABLET traMADoL (ULTRAM) 50 mg tablet       Take 1 tablet (50 mg total) by mouth every 8 (eight) hours as needed for Pain.    Take 1 tablet (50 mg total) by mouth every 4 to 6 hours as needed for Pain.   Discontinued Medications    HYDROCODONE-ACETAMINOPHEN (NORCO) 5-325 MG PER TABLET    Take 1 tablet by mouth every 6 (six) hours as needed for Pain.    TRAMADOL (ULTRAM) 50 MG TABLET    Take 1 tablet (50 mg total) by mouth every 4 (four) hours as needed for Pain.       Review of patient's allergies indicates:   Allergen Reactions    Protamine Hives     Urticaria, possible upper airway swelling       Past Surgical History:   Procedure Laterality Date    ABLATION OF ARRHYTHMOGENIC FOCUS FOR ATRIAL FIBRILLATION N/A 2/27/2020    Procedure: Ablation atrial fibrillation;  Surgeon: Gio Brown MD;  Location: [facility] EP LAB;  Service: Cardiology;  Laterality: N/A;  afib, DAMIEN (cx if SR), PVI, PITO, anes, MB, 3 Prep    CHOLECYSTECTOMY      CLOSURE OF WOUND Left 7/1/2020    Procedure: CLOSURE, WOUND;  Surgeon: Barb Veras DPM;  Location: [facility] OR;  Service: Podiatry;  Laterality: Left;    COLONOSCOPY N/A 3/4/2022    Procedure: COLONOSCOPY;  Surgeon: Yolis Freitas MD;  Location: [facility] ENDO;  Service: Endoscopy;  Laterality: N/A;    ESOPHAGOGASTRODUODENOSCOPY N/A 3/4/2022    Procedure: EGD (ESOPHAGOGASTRODUODENOSCOPY);  Surgeon: Yolis Freitas MD;  Location: [facility] ENDO;  Service: Endoscopy;  Laterality: N/A;    INJECTION OF ANESTHETIC AGENT INTO SACROILIAC JOINT Left 3/24/2021    Procedure: Left BLOCK, SACROILIAC JOINT and  bilateral rhomboid TPI;  Surgeon: Dillan Tsai MD;  Location: [facility] PAIN MGT;  Service: Pain Management;  Laterality: Left;    INTRALUMINAL GASTROINTESTINAL TRACT IMAGING VIA CAPSULE N/A 3/22/2022    Procedure: IMAGING PROCEDURE, GI TRACT, INTRALUMINAL, VIA CAPSULE;  Surgeon: Justice Martinez RN;  Location: [facility] ENDO;  Service: Endoscopy;  Laterality: N/A;    REVERSE TOTAL SHOULDER ARTHROPLASTY Left 1/10/2022    Procedure: ARTHROPLASTY, SHOULDER, TOTAL, REVERSE;  Surgeon: Anthony Alvarado MD;  Location: [facility] OR;  Service: Orthopedics;  Laterality: Left;  left reverse total shoulder arthroplasty     SELECTIVE INJECTION OF ANESTHETIC AGENT AROUND LUMBAR SPINAL NERVE ROOT BY TRANSFORAMINAL APPROACH Left 6/11/2020    Procedure: BLOCK, SPINAL NERVE ROOT, LUMBAR, SELECTIVE, TRANSFORAMINAL APPROACH Left L4-5, L5-S1 TFESI with RN IV sedation;  Surgeon: Dillan Tsai MD;  Location: [facility] PAIN MGT;  Service: Pain Management;  Laterality: Left;    TOE AMPUTATION Left 6/29/2020    Procedure: AMPUTATION, TOE;  Surgeon: Barb Veras DPM;  Location: [facility] OR;  Service: Podiatry;  Laterality: Left;  great toe       Family History   Problem Relation Age of Onset    Glaucoma Mother     Cataracts Mother     Diabetes Mother     Cataracts Father     Stroke Father     Glaucoma Sister         x3    Cataracts Sister         x3    Diabetes Sister         x1    Stroke Sister         x1    Glaucoma Maternal Aunt     Diabetes Maternal Aunt     No Known Problems Brother     No Known Problems Daughter     No Known Problems Son     Cancer Maternal Grandfather         lung (smoker)    Prostate cancer Neg Hx     Heart disease Neg Hx     Kidney disease Neg Hx        Social History     Socioeconomic History    Marital status: Legally     Number of children: 5   Occupational History    Occupation: disabled    Occupation: PT at [employer]    Tobacco Use    Smoking status: Never Smoker     "Smokeless tobacco: Never Used   Substance and Sexual Activity    Alcohol use: Yes     Comment: rare, maybe holidays    Drug use: Not Currently     Types: Cocaine    Sexual activity: Not Currently     Partners: Female   Social History Narrative    Utilizing [employer] transportation which has been unreliable.      Social Determinants of Health     Financial Resource Strain: Medium Risk    Difficulty of Paying Living Expenses: Somewhat hard   Food Insecurity: Food Insecurity Present    Worried About Running Out of Food in the Last Year: Often true    Ran Out of Food in the Last Year: Often true   Transportation Needs: Unmet Transportation Needs    Lack of Transportation (Medical): Yes    Lack of Transportation (Non-Medical): Yes   Physical Activity: Insufficiently Active    Days of Exercise per Week: 1 day    Minutes of Exercise per Session: 60 min   Stress: Stress Concern Present    Feeling of Stress : Very much   Social Connections: Moderately Isolated    Frequency of Communication with Friends and Family: More than three times a week    Frequency of Social Gatherings with Friends and Family: Once a week    Attends Bahai Services: More than 4 times per year    Active Member of Clubs or Organizations: No    Attends Club or Organization Meetings: Never    Marital Status:    Housing Stability: Low Risk     Unable to Pay for Housing in the Last Year: No    Number of Places Lived in the Last Year: 2    Unstable Housing in the Last Year: No       Vitals:    03/09/22 0927   BP: (!) 166/83   Pulse: 91   Weight: 92.6 kg (204 lb 2.3 oz)   Height: 5' 5" (1.651 m)       Hemoglobin A1C   Date Value Ref Range Status   03/03/2022 6.1 (H) 4.0 - 5.6 % Final     Comment:     ADA Screening Guidelines:  5.7-6.4%  Consistent with prediabetes  >or=6.5%  Consistent with diabetes    High levels of fetal hemoglobin interfere with the HbA1C  assay. Heterozygous hemoglobin variants (HbS, HgC, etc)do  not " significantly interfere with this assay.   However, presence of multiple variants may affect accuracy.     12/02/2021 6.7 (H) 4.0 - 5.6 % Final     Comment:     ADA Screening Guidelines:  5.7-6.4%  Consistent with prediabetes  >or=6.5%  Consistent with diabetes    High levels of fetal hemoglobin interfere with the HbA1C  assay. Heterozygous hemoglobin variants (HbS, HgC, etc)do  not significantly interfere with this assay.   However, presence of multiple variants may affect accuracy.     07/08/2021 6.7 (H) 4.0 - 5.6 % Final     Comment:     ADA Screening Guidelines:  5.7-6.4%  Consistent with prediabetes  >or=6.5%  Consistent with diabetes    High levels of fetal hemoglobin interfere with the HbA1C  assay. Heterozygous hemoglobin variants (HbS, HgC, etc)do  not significantly interfere with this assay.   However, presence of multiple variants may affect accuracy.         Review of Systems   Constitutional: Negative for chills and fever.   Respiratory: Negative for shortness of breath.    Cardiovascular: Negative for chest pain, palpitations, orthopnea, claudication and leg swelling.   Gastrointestinal: Negative for diarrhea, nausea and vomiting.   Musculoskeletal: Negative for joint pain.   Skin: Negative for rash.   Neurological: Positive for tingling and sensory change.   Psychiatric/Behavioral: Negative.              Objective:      PHYSICAL EXAM: Apperance: Alert and orient in no distress,well developed, and with good attention to grooming and body habits  Patient presents ambulating in slip on shoes and in electric wheelchair  LOWER EXTREMITY EXAM:  VASCULAR: Dorsalis pedis pulses 2/4 bilateral and Posterior Tibial pulses 2/4 bilateral. Capillary fill time <4 seconds bilateral. No edema observed bilateral. Varicosities absent bilateral. Skin temperature of the lower extremities is warm to warm, proximal to distal. Hair growth dim bilateral.  DERMATOLOGICAL: No skin rashes, subcutaneous nodules, lesions, or  ulcers observed bilateral. Nails 1,2,3,4,5 right and 2,3,4,5 left elongated, thickened, and discolored with subungual debris. Webspaces 1,2,3,4 bilateral clean, dry and without evidence of break in skin integrity. Mild hyperkeratotic tissue noted to left medial 1st MPJ and distal hallux amputation stump.   NEUROLOGICAL: Light touch, sharp-dull, proprioception all present and equal bilaterally.  Vibratory sensation diminished at bilateral hallux. Protective sensation absent sites as tested with a Orchard-Kimmie 5.07 monofilament.   MUSCULOSKELETAL: Muscle strength is 5/5 for foot inverters, everters, plantarflexors, and dorsiflexors. Muscle tone is normal. Left hallux amputation noted.           Assessment:       Encounter Diagnoses   Name Primary?    Type II diabetes mellitus with neurological manifestations Yes    Pre-ulcerative calluses - Left Foot     Dermatophytosis of nail          Plan:   Type II diabetes mellitus with neurological manifestations    Pre-ulcerative calluses - Left Foot    Dermatophytosis of nail      I counseled the patient on his conditions, regarding findings of my examination, my impressions, and usual treatment plan.   Greater than 15 minutes of a 20 minute appointment spent on education about the diabetic foot, neuropathy, and prevention of limb loss.  Shoe inspection. Diabetic Foot Education. Patient reminded of the importance of good nutrition and blood sugar control to help prevent podiatric complications of diabetes. Patient instructed on proper foot hygeine. We discussed wearing proper shoe gear, daily foot inspections, never walking without protective shoe gear, never putting sharp instruments to feet.    With patient's permission, nails as noted above bilateral were debrided/trimmed in length and thickness aggressively to their soft tissue attachment mechanically and with electric , removing all offending nail and debris. Patient relates relief following the  procedure.  With patient's permission, left pre-ulcerative callus trimmed in thickness with #15 blade in thickness without incident.   Patient  will continue to monitor the areas daily, inspect feet, wear protective shoe gear when ambulatory, moisturizer to maintain skin integrity. Patient reminded of the importance of good nutrition and blood sugar control to help prevent podiatric complications of diabetes.  Patient to return 3 months or sooner if needed.                 Barb Veras DPM  [facility]

## 2022-03-10 ENCOUNTER — PES CALL (OUTPATIENT)
Dept: ADMINISTRATIVE | Facility: CLINIC | Age: 67
End: 2022-03-10
Payer: MEDICARE

## 2022-03-11 ENCOUNTER — DOCUMENT SCAN (OUTPATIENT)
Dept: HOME HEALTH SERVICES | Facility: HOSPITAL | Age: 67
End: 2022-03-11
Payer: MEDICARE

## 2022-03-11 ENCOUNTER — TELEPHONE (OUTPATIENT)
Dept: INTERNAL MEDICINE | Facility: CLINIC | Age: 67
End: 2022-03-11
Payer: MEDICARE

## 2022-03-11 ENCOUNTER — PES CALL (OUTPATIENT)
Dept: ADMINISTRATIVE | Facility: CLINIC | Age: 67
End: 2022-03-11
Payer: MEDICARE

## 2022-03-11 ENCOUNTER — TELEPHONE (OUTPATIENT)
Dept: ORTHOPEDICS | Facility: CLINIC | Age: 67
End: 2022-03-11
Payer: MEDICARE

## 2022-03-11 ENCOUNTER — PATIENT OUTREACH (OUTPATIENT)
Dept: ADMINISTRATIVE | Facility: HOSPITAL | Age: 67
End: 2022-03-11
Payer: MEDICARE

## 2022-03-11 RX ORDER — TRAMADOL HYDROCHLORIDE 50 MG/1
50 TABLET ORAL EVERY 8 HOURS PRN
Qty: 15 TABLET | Refills: 0 | Status: SHIPPED | OUTPATIENT
Start: 2022-03-11 | End: 2022-10-24

## 2022-03-11 NOTE — TELEPHONE ENCOUNTER
----- Message from Neyda Bashir sent at 3/11/2022 11:00 AM CST -----  Contact: Crow Maria needs a call back at [phone], Regards to getting a refill and something stronger for pain he recently had shoulder surgery and feels he needs to get something stronger to take.    [id number] - [place] - [address] AT [address] [facility]  [address] [place] [address]  Phone: [phone] Fax: [phone]    Thanks  Td

## 2022-03-11 NOTE — TELEPHONE ENCOUNTER
"Spoke w/ patient in regards to his pain medicine. Expressed to patient that the tramadol has been sent to his pharmacy. Patient states, "that tramadol is not what he wants and that he is going to find another doctor." expressed to patient that as previously discussed in the last visit that Dr. Alvarado would be transitioning him from norco 5mg to tramadol. Patient was very irate and hung up the phone-DD    ----- Message from Yuliya Suresh PA-C sent at 3/11/2022 11:53 AM CST -----  Contact: Crow Swan talk to me about this    ----- Message -----  From: Abi Mayen MA  Sent: 3/11/2022  11:19 AM CST  To: Yuliya Suresh PA-C    Good morning,     Patient would like a refill of tramadol. Can you please assist me with this? He keeps asking for norco 5mg but Dr. Alvarado is no longer prescribing that but instead tramadol and it would be time for a refill of his tramadol    Please advise   ----- Message -----  From: Neyda Bashir  Sent: 3/11/2022  11:00 AM CST  To: Christiano Cruz Staff    Crow needs a call back at [phone], Regards to getting a refill and something stronger for pain.    [id number] - [facility] [place] - [address]  [address] [place] [address]  Phone: [phone] Fax: [phone]    Thanks  Td                "

## 2022-03-14 ENCOUNTER — TELEPHONE (OUTPATIENT)
Dept: INTERNAL MEDICINE | Facility: CLINIC | Age: 67
End: 2022-03-14
Payer: MEDICARE

## 2022-03-14 ENCOUNTER — CLINICAL SUPPORT (OUTPATIENT)
Dept: DIABETES | Facility: CLINIC | Age: 67
End: 2022-03-14
Payer: MEDICARE

## 2022-03-14 VITALS — BODY MASS INDEX: 31.96 KG/M2 | HEIGHT: 65 IN | WEIGHT: 191.81 LBS

## 2022-03-14 DIAGNOSIS — E11.65 UNCONTROLLED TYPE 2 DIABETES MELLITUS WITH HYPERGLYCEMIA: Primary | ICD-10-CM

## 2022-03-14 PROCEDURE — G0108 PR DIAB MANAGE TRN  PER INDIV: ICD-10-PCS | Mod: S$GLB,,, | Performed by: DIETITIAN, REGISTERED

## 2022-03-14 PROCEDURE — 99999 PR PBB SHADOW E&M-EST. PATIENT-LVL II: ICD-10-PCS | Mod: PBBFAC,,, | Performed by: DIETITIAN, REGISTERED

## 2022-03-14 PROCEDURE — G0108 DIAB MANAGE TRN  PER INDIV: HCPCS | Mod: S$GLB,,, | Performed by: DIETITIAN, REGISTERED

## 2022-03-14 PROCEDURE — 99999 PR PBB SHADOW E&M-EST. PATIENT-LVL II: CPT | Mod: PBBFAC,,, | Performed by: DIETITIAN, REGISTERED

## 2022-03-14 NOTE — TELEPHONE ENCOUNTER
Pt call returned, appt r/s'd due to transportation issues. Per  okay to r's to Wed in order to keep DM appt today for 10:30 for dexcom placement. Pt voiced understanding.

## 2022-03-14 NOTE — TELEPHONE ENCOUNTER
----- Message from Irma Reynolds sent at 3/14/2022  8:08 AM CDT -----  Contact: self  Crow Green states that he will be running late for his appointment due to transportation. You can contact him @[phone].

## 2022-03-14 NOTE — PROGRESS NOTES
"Diabetes Care Specialist Progress Note  Author: Ashely Son, CALVIN  Date: 3/14/2022         Lab Results   Component Value Date    HGBA1C 6.1 (H) 03/03/2022     CURRENT DM MEDICATIONS:   · Jardiance, 25 mg  · Ozempic, 1 mg weekly - Wednesdays   · Tresiba, 40 units - in am   · Novolog, 10 units before meals      Clinical    Weight: 87 kg (191 lb 12.8 oz)   Height: 5' 5" (165.1 cm)   Body mass index is 31.92 kg/m².  Wt gain of 5 lbs since last visit on 2/3/2022    Today's interventions were provided through individual discussion, instruction, and written materials were provided.      Patient verbalized understanding of instruction and written materials.  Pt was able to return back demonstration of instructions today. Patient understood key points, needs reinforcement and further instruction.     Diabetes Self-Management Care Plan:    Today's Diabetes Self-Management Care Plan was developed with Crow's input. Crow has agreed to work toward the following goal(s) to improve his/her overall diabetes control.      Care Plan: Diabetes Management   Updates made since 2/12/2022 12:00 AM      Problem: Blood Glucose Self-Monitoring       Goal: Patient agrees to check blood sugars using CGM    Start Date: 12/15/2021   Expected End Date: 12/15/2022   This Visit's Progress: Met   Recent Progress: Met   Priority: High   Barriers: No Barriers Identified   Note:    Pt tried to start a new sensor at home, but had trouble.   Able to troubleshoot in office today and start a new sensor   Pt understands the steps of inserting the sensor. Sometimes has issues with the brittnee.   Added Dexcom technical support to his contacts  Encouraged to call them when he needs assistance as they are available 24/7.   Pt feels that he will be able to start next sensor on his own         Follow Up Plan     No follow-ups on file.    Today's care plan and follow up schedule was discussed with patient.  Crow verbalized understanding of the care plan, " goals, and agrees to follow up plan.        The patient was encouraged to communicate with his/her health care provider/physician and care team regarding his/her condition(s) and treatment.  I provided the patient with my contact information today and encouraged to contact me via phone or [facility] as needed.     Length of Visit   Total Time: 30 Minutes

## 2022-03-14 NOTE — TELEPHONE ENCOUNTER
----- Message from Kaila Vaughan sent at 3/14/2022 10:14 AM CDT -----  States he's calling regarding his appt for Wednesday. States his ride won't except it. States it has to be Thursday or Friday. Please call pt [phone]. Thank you

## 2022-03-14 NOTE — TELEPHONE ENCOUNTER
----- Message from Anna Bashir sent at 3/14/2022 10:40 AM CDT -----  Pt is returning nurse call. Pt can be reached at [phone] (home)     Pt states that If he don't answer to call back a second time.

## 2022-03-15 ENCOUNTER — CLINICAL SUPPORT (OUTPATIENT)
Dept: REHABILITATION | Facility: HOSPITAL | Age: 67
End: 2022-03-15
Payer: MEDICARE

## 2022-03-15 DIAGNOSIS — R29.898 ARM WEAKNESS: Primary | ICD-10-CM

## 2022-03-15 DIAGNOSIS — M25.619 DECREASED RANGE OF MOTION OF SHOULDER, UNSPECIFIED LATERALITY: ICD-10-CM

## 2022-03-15 PROCEDURE — 97110 THERAPEUTIC EXERCISES: CPT

## 2022-03-15 PROCEDURE — 97140 MANUAL THERAPY 1/> REGIONS: CPT

## 2022-03-15 NOTE — PROGRESS NOTES
[facility]   Physical Therapy Treatment Note     Name: Crow FISCHER [facility] Number: [id number]    Therapy Diagnosis:   Encounter Diagnoses   Name Primary?    Arm weakness Yes    Decreased range of motion of shoulder, unspecified laterality      Physician: Anthony Alvarado    Visit Date: 3/15/2022      Physician Orders: PT Eval and Treat   Medical Diagnosis from Referral:   M75.102,M12.812 (ICD-10-CM) - Rotator cuff tear arthropathy of left shoulder   Z96.612 (ICD-10-CM) - S/P reverse total shoulder arthroplasty, left      Evaluation Date: 2/3/2022  Authorization Period Expiration: 4/6/2022  Plan of Care Expiration: 4/3/2022  Progress Note Due: 4/3/2022  Visit # / Visits authorized: 7/20 (+1 eval)   FOTO: 1/3    PTA Visit #: 0/5     Precautions: Standard, Diabetes and Fall     Surgery: S/P Left Reverse Total Shoulder - 1/10/2022    Time In: 9:05am  Time Out: 9:45am  Total Billable Time: 39 minutes (Billing reflects 1 on 1 treatment time spent with patient)     SUBJECTIVE     Patient reports: He is feeling a little stiff this morning.   He was compliant with home exercise program with pain.  Response to previous treatment: felt pretty good yesterday.   Functional change: decreased sleep, unable to cook, clean home and hard to shop for groceries    Pain: 10/10     Location: anterior left shoulder joint    OBJECTIVE     Objective Measures updated at progress report unless specified.     Left Shoulder ROM: Flexion-135, Abduction-135, External Rotation- 90, Internal Rotation-45    TREATMENT     Crow received the treatments listed below:       MANUAL THERAPY TECHNIQUES were applied for (12) minutes, including:    Manual Intervention Performed Today    Soft Tissue Mobilization  left supraspinatus , infraspinatus , teres major and minor , subscapularis  and deltoid   Joint Mobilizations     Mobilization with movement x PROM Flexion, Abduction, and Rotation    Subscapular  release  Left   Functional Dry Needling        Plan for Next Visit:      All passive range was performed to his tolerance in supine.     THERAPEUTIC EXERCISES to develop strength, endurance, ROM and flexibility for (27) minutes including:    Intervention Performed Today    Bicep Curls  3 minutes    Table slides    3  minutes Flexion  3 minutes Scaption   Scap Retractions  3x10 red band   Shoulder Isometrics x [id number] all direction   Pulleys Flexion and Abduction  x 3 minutes each    Wand Flexion and Abduction  x 3 minutes each   Shoulder Flexion x 2 minutes   Wall Walks x 2 minutes      Plan for Next Visit:        PATIENT EDUCATION AND HOME EXERCISES     Home Exercises Provided and Patient Education Provided     Education provided: (during session) minutes   Patient educated on biomechanical justification for therapeutic exercise and importance of compliance with HEP in order to improve overall impairments and QOL    Patient was educated on all the above exercise prior/during/after for proper posture, positioning, and execution for safe performance with home exercise program.     Written Home Exercises Provided: yes.  Exercises were reviewed and Crow was able to demonstrate them prior to the end of the session.  Crow demonstrated good  understanding of the education provided. See EMR under Patient Instructions for exercises provided during therapy sessions.      ASSESSMENT     Wall walks were added today to work on more active assisted range of motion. He was advised to start working on walk walks at home to help progress in range of motion. Overall, he tolerated today's treatment well.     Crow is progressing well towards his goals.   Pt prognosis is Fair.     Pt will continue to benefit from skilled outpatient physical therapy to address the deficits listed in the problem list box on initial evaluation, provide pt/family education and to maximize pt's level of independence in the home and community  environment.     Pt's spiritual, cultural and educational needs considered and pt agreeable to plan of care and goals.       Anticipated Barriers for therapy: chronic shoulder pain, prior inconsistency with therapy       Goals:  Short Term Goals: 4 weeks   1. Patient will improve shoulder flexion to 135 degrees in order to come out of sling.  2. Patient will improve shoulder abduction to 115 degrees in order to start light active range.  3. Patient will be independent with HEP in order to further progress and return to maximal function.  4. Pain rating at Worst: 5/10 in order to progress towards increased independence with activity.  5. Patient will improve postural awareness.      Long Term Goals: 8 weeks   1. Patient will improve shoulder strength to 4-/5 in order to perform ADLs with left arm.  2. Patient will improve elbow strength to 4/5 in order to hold small items.  3. Patient will improve shoulder flexion to 150 degrees in order to reach overhead.   4. Patient will improve shoulder abduciton to 145 degrees in order to reach into cabinets.   5. Patient will self report improvement to 40% limitation on the FOTO Shoulder Survey.    Goals Key:  PC= progressing/continue; PM= partially met;        DC= discontinue    PLAN     Continue Plan of Care (POC) and progress per patient tolerance. See treatment section for details on planned progressions next session.      Rupert Sanchez, PT, DPT

## 2022-03-16 ENCOUNTER — PATIENT OUTREACH (OUTPATIENT)
Dept: ADMINISTRATIVE | Facility: HOSPITAL | Age: 67
End: 2022-03-16
Payer: MEDICARE

## 2022-03-16 NOTE — PROGRESS NOTES
Patient notified to confirm hospital follow up scheduled with PCP on 3/17/22. Patient states he cannot keep that appointment, states he was supposed to be notified at least 3 days in advance of any appointment. Patient has rescheduled to Monday, 3/21/22.

## 2022-03-17 ENCOUNTER — TELEPHONE (OUTPATIENT)
Dept: PRIMARY CARE CLINIC | Facility: CLINIC | Age: 67
End: 2022-03-17
Payer: MEDICARE

## 2022-03-17 ENCOUNTER — CLINICAL SUPPORT (OUTPATIENT)
Dept: REHABILITATION | Facility: HOSPITAL | Age: 67
End: 2022-03-17
Payer: MEDICARE

## 2022-03-17 DIAGNOSIS — R29.898 ARM WEAKNESS: Primary | ICD-10-CM

## 2022-03-17 DIAGNOSIS — M25.619 DECREASED RANGE OF MOTION OF SHOULDER, UNSPECIFIED LATERALITY: ICD-10-CM

## 2022-03-17 PROBLEM — R53.1 GENERALIZED WEAKNESS: Status: RESOLVED | Noted: 2021-05-03 | Resolved: 2022-03-17

## 2022-03-17 PROBLEM — G89.29 CHRONIC LEFT SHOULDER PAIN: Status: RESOLVED | Noted: 2021-01-21 | Resolved: 2022-03-17

## 2022-03-17 PROBLEM — M25.512 CHRONIC LEFT SHOULDER PAIN: Status: RESOLVED | Noted: 2021-01-21 | Resolved: 2022-03-17

## 2022-03-17 PROCEDURE — 97140 MANUAL THERAPY 1/> REGIONS: CPT

## 2022-03-17 PROCEDURE — 97110 THERAPEUTIC EXERCISES: CPT

## 2022-03-17 NOTE — PROGRESS NOTES
Mr. Green contacted W stating he was checking in with us ([facility]) program. He stated he was returning a call from a long time ago. Patient stated he is trying to take care of himself. Patient expressed thanks for the help he received from Awilda Nogueira LCSW.

## 2022-03-17 NOTE — PROGRESS NOTES
[facility]   Physical Therapy Treatment Note     Name: Crow FISCHER [facility] Number: [id number]    Therapy Diagnosis:   Encounter Diagnoses   Name Primary?    Arm weakness Yes    Decreased range of motion of shoulder, unspecified laterality      Physician: Anthony Alvarado    Visit Date: 3/17/2022      Physician Orders: PT Eval and Treat   Medical Diagnosis from Referral:   M75.102,M12.812 (ICD-10-CM) - Rotator cuff tear arthropathy of left shoulder   Z96.612 (ICD-10-CM) - S/P reverse total shoulder arthroplasty, left      Evaluation Date: 2/3/2022  Authorization Period Expiration: 4/6/2022  Plan of Care Expiration: 4/3/2022  Progress Note Due: 4/3/2022  Visit # / Visits authorized: 8/20 (+1 eval)   FOTO: 1/3    PTA Visit #: 0/5     Precautions: Standard, Diabetes and Fall     Surgery: S/P Left Reverse Total Shoulder - 1/10/2022    Time In: 9:30am  Time Out: 10:15am  Total Billable Time: 45 minutes (Billing reflects 1 on 1 treatment time spent with patient)     SUBJECTIVE     Patient reports: He was a little more sore after last visit, but pain has improved.  He was compliant with home exercise program with pain.  Response to previous treatment: felt pretty good yesterday.   Functional change: decreased sleep, unable to cook, clean home and hard to shop for groceries    Pain: 5/10     Location: anterior left shoulder joint    OBJECTIVE     Objective Measures updated at progress report unless specified.     Left Shoulder ROM: Flexion-135, Abduction-135, External Rotation- 90, Internal Rotation-45    TREATMENT     Crow received the treatments listed below:     MANUAL THERAPY TECHNIQUES were applied for (12) minutes, including:    Manual Intervention Performed Today    Soft Tissue Mobilization  left supraspinatus , infraspinatus , teres major and minor , subscapularis  and deltoid   Joint Mobilizations     Mobilization with movement x PROM Flexion, Abduction, and Rotation     Subscapular release  Left   Functional Dry Needling        Plan for Next Visit:      All passive range was performed to his tolerance in supine.     THERAPEUTIC EXERCISES to develop strength, endurance, ROM and flexibility for (35) minutes including:    Intervention Performed Today    Bicep Curls  3 minutes    Table slides    3  minutes Flexion  3 minutes Scaption   Scap Retractions  3x10 red band   Shoulder Isometrics x 2 Minutes all direction   Pulleys Flexion and Abduction  x 3 minutes each    Wand Flexion and Abduction  x 3 minutes each   Shoulder Flexion  2 minutes   Wall Walks x 2 minutes      Plan for Next Visit:        PATIENT EDUCATION AND HOME EXERCISES     Home Exercises Provided and Patient Education Provided     Education provided: (during session) minutes   Patient educated on biomechanical justification for therapeutic exercise and importance of compliance with HEP in order to improve overall impairments and QOL    Patient was educated on all the above exercise prior/during/after for proper posture, positioning, and execution for safe performance with home exercise program.     Written Home Exercises Provided: yes.  Exercises were reviewed and Crow was able to demonstrate them prior to the end of the session.  Crow demonstrated good  understanding of the education provided. See EMR under Patient Instructions for exercises provided during therapy sessions.      ASSESSMENT     Wall walks were added today to work on more active assisted range of motion. He was advised to start working on walk walks at home to help progress in range of motion. Overall, he tolerated today's treatment well.     Crow is progressing well towards his goals.   Pt prognosis is Fair.     Pt will continue to benefit from skilled outpatient physical therapy to address the deficits listed in the problem list box on initial evaluation, provide pt/family education and to maximize pt's level of independence in the home  and community environment.     Pt's spiritual, cultural and educational needs considered and pt agreeable to plan of care and goals.       Anticipated Barriers for therapy: chronic shoulder pain, prior inconsistency with therapy       Goals:  Short Term Goals: 4 weeks   1. Patient will improve shoulder flexion to 135 degrees in order to come out of sling.  2. Patient will improve shoulder abduction to 115 degrees in order to start light active range.  3. Patient will be independent with HEP in order to further progress and return to maximal function.  4. Pain rating at Worst: 5/10 in order to progress towards increased independence with activity.  5. Patient will improve postural awareness.      Long Term Goals: 8 weeks   1. Patient will improve shoulder strength to 4-/5 in order to perform ADLs with left arm.  2. Patient will improve elbow strength to 4/5 in order to hold small items.  3. Patient will improve shoulder flexion to 150 degrees in order to reach overhead.   4. Patient will improve shoulder abduciton to 145 degrees in order to reach into cabinets.   5. Patient will self report improvement to 40% limitation on the FOTO Shoulder Survey.    Goals Key:  PC= progressing/continue; PM= partially met;        DC= discontinue    PLAN     Continue Plan of Care (POC) and progress per patient tolerance. See treatment section for details on planned progressions next session.      Chris Cardona, PT, DPT

## 2022-03-21 ENCOUNTER — OFFICE VISIT (OUTPATIENT)
Dept: INTERNAL MEDICINE | Facility: CLINIC | Age: 67
End: 2022-03-21
Payer: MEDICARE

## 2022-03-21 VITALS
WEIGHT: 195.13 LBS | HEIGHT: 65 IN | DIASTOLIC BLOOD PRESSURE: 86 MMHG | OXYGEN SATURATION: 98 % | RESPIRATION RATE: 17 BRPM | BODY MASS INDEX: 32.51 KG/M2 | SYSTOLIC BLOOD PRESSURE: 122 MMHG | HEART RATE: 77 BPM | TEMPERATURE: 98 F

## 2022-03-21 DIAGNOSIS — M25.511 CHRONIC RIGHT SHOULDER PAIN: Primary | ICD-10-CM

## 2022-03-21 DIAGNOSIS — M47.26 OSTEOARTHRITIS OF SPINE WITH RADICULOPATHY, LUMBAR REGION: ICD-10-CM

## 2022-03-21 DIAGNOSIS — G89.29 CHRONIC RIGHT SHOULDER PAIN: Primary | ICD-10-CM

## 2022-03-21 PROCEDURE — 3044F HG A1C LEVEL LT 7.0%: CPT | Mod: CPTII,S$GLB,, | Performed by: FAMILY MEDICINE

## 2022-03-21 PROCEDURE — 99999 PR PBB SHADOW E&M-EST. PATIENT-LVL V: CPT | Mod: PBBFAC,,, | Performed by: FAMILY MEDICINE

## 2022-03-21 PROCEDURE — 3079F DIAST BP 80-89 MM HG: CPT | Mod: CPTII,S$GLB,, | Performed by: FAMILY MEDICINE

## 2022-03-21 PROCEDURE — 99213 PR OFFICE/OUTPT VISIT, EST, LEVL III, 20-29 MIN: ICD-10-PCS | Mod: S$GLB,,, | Performed by: FAMILY MEDICINE

## 2022-03-21 PROCEDURE — 4010F ACE/ARB THERAPY RXD/TAKEN: CPT | Mod: CPTII,S$GLB,, | Performed by: FAMILY MEDICINE

## 2022-03-21 PROCEDURE — 1159F PR MEDICATION LIST DOCUMENTED IN MEDICAL RECORD: ICD-10-PCS | Mod: CPTII,S$GLB,, | Performed by: FAMILY MEDICINE

## 2022-03-21 PROCEDURE — 99999 PR PBB SHADOW E&M-EST. PATIENT-LVL V: ICD-10-PCS | Mod: PBBFAC,,, | Performed by: FAMILY MEDICINE

## 2022-03-21 PROCEDURE — 3008F BODY MASS INDEX DOCD: CPT | Mod: CPTII,S$GLB,, | Performed by: FAMILY MEDICINE

## 2022-03-21 PROCEDURE — 3079F PR MOST RECENT DIASTOLIC BLOOD PRESSURE 80-89 MM HG: ICD-10-PCS | Mod: CPTII,S$GLB,, | Performed by: FAMILY MEDICINE

## 2022-03-21 PROCEDURE — 3008F PR BODY MASS INDEX (BMI) DOCUMENTED: ICD-10-PCS | Mod: CPTII,S$GLB,, | Performed by: FAMILY MEDICINE

## 2022-03-21 PROCEDURE — 4010F PR ACE/ARB THEARPY RXD/TAKEN: ICD-10-PCS | Mod: CPTII,S$GLB,, | Performed by: FAMILY MEDICINE

## 2022-03-21 PROCEDURE — 3074F PR MOST RECENT SYSTOLIC BLOOD PRESSURE < 130 MM HG: ICD-10-PCS | Mod: CPTII,S$GLB,, | Performed by: FAMILY MEDICINE

## 2022-03-21 PROCEDURE — 1159F MED LIST DOCD IN RCRD: CPT | Mod: CPTII,S$GLB,, | Performed by: FAMILY MEDICINE

## 2022-03-21 PROCEDURE — 99213 OFFICE O/P EST LOW 20 MIN: CPT | Mod: S$GLB,,, | Performed by: FAMILY MEDICINE

## 2022-03-21 PROCEDURE — 3074F SYST BP LT 130 MM HG: CPT | Mod: CPTII,S$GLB,, | Performed by: FAMILY MEDICINE

## 2022-03-21 PROCEDURE — 3044F PR MOST RECENT HEMOGLOBIN A1C LEVEL <7.0%: ICD-10-PCS | Mod: CPTII,S$GLB,, | Performed by: FAMILY MEDICINE

## 2022-03-21 NOTE — PROGRESS NOTES
Subjective:       Patient ID: Crow Green is a 66 y.o. male.    Chief Complaint: Hospital Follow Up    HPI  Came in today for hospital follow-up.  Was admitted from March 2nd through March 5th for symptomatic anemia.  He had presented to the emergency room after a fall sane at his knees gave out on him.  He was having pain in his right shoulder wrists lower extremities.  That time reported dark stool with diarrhea.  Found to have hemoglobin 7 thus received 1 unit of PRBCs in ED.  had EGD and colonoscopy completed during admission and there are no signs of GI bleed.  Video capsule was planned for outpatient workup.  Was advised to hold aspirin and Eliquis. Has capsule videography tomorrow      Since that admission has started with outpatient physical therapy at the [facility] Care Note    Family and/or Caretaker present at visit?  Yes.  Diagnostic tests reviewed/disposition: No diagnosic tests pending after this hospitalization.  Disease/illness education: discussed  Home health/community services discussion/referrals: Patient does not have home health established from hospital visit.  They do not need home health.  If needed, we will set up home health for the patient.   Establishment or re-establishment of referral orders for community resources: No other necessary community resources.   Discussion with other health care providers: No discussion with other health care providers necessary.       Family History   Problem Relation Age of Onset    Glaucoma Mother     Cataracts Mother     Diabetes Mother     Cataracts Father     Stroke Father     Glaucoma Sister         x3    Cataracts Sister         x3    Diabetes Sister         x1    Stroke Sister         x1    Glaucoma Maternal Aunt     Diabetes Maternal Aunt     No Known Problems Brother     No Known Problems Daughter     No Known Problems Son     Cancer Maternal Grandfather         lung (smoker)    Prostate cancer Neg Hx      Heart disease Neg Hx     Kidney disease Neg Hx        Current Outpatient Medications:     acetaminophen (TYLENOL) 500 MG tablet, Take 2 tablets (1,000 mg total) by mouth every 8 (eight) hours as needed for Pain., Disp: 60 tablet, Rfl: 0    albuterol (PROVENTIL) 2.5 mg /3 mL (0.083 %) nebulizer solution, Take 3 mLs (2.5 mg total) by nebulization every 4 to 6 hours as needed for Wheezing or Shortness of Breath., Disp: 360 mL, Rfl: 11    allopurinoL (ZYLOPRIM) 100 MG tablet, Take 1 tablet (100 mg total) by mouth once daily., Disp: 90 tablet, Rfl: 3    amLODIPine (NORVASC) 2.5 MG tablet, Take 1 tablet (2.5 mg total) by mouth every evening., Disp: 30 tablet, Rfl: 3    atorvastatin (LIPITOR) 40 MG tablet, Take 1 tablet (40 mg total) by mouth every evening., Disp: 90 tablet, Rfl: 4    azaTHIOprine (IMURAN) 50 mg Tab, Take 1 tablet (50 mg total) by mouth once daily., Disp: 90 tablet, Rfl: 0    baclofen (LIORESAL) 10 MG tablet, TAKE 1 TABLET EVERY DAY, Disp: 90 tablet, Rfl: 0    blood sugar diagnostic Strp, 1 each by Misc.(Non-Drug; Combo Route) route 4 (four) times daily., Disp: 300 each, Rfl: 3    blood-glucose meter (TRUE METRIX GLUCOSE METER) kit, Use as instructed to test blood glucose meter daily., Disp: 1 each, Rfl: 1    colchicine (COLCRYS) 0.6 mg tablet, TAKE 1 TABLET (0.6 MG TOTAL) BY MOUTH ONCE DAILY., Disp: 90 tablet, Rfl: 0    ELIQUIS 5 mg Tab, TAKE 1 TABLET TWICE DAILY, Disp: 180 tablet, Rfl: 1    empagliflozin (JARDIANCE) 25 mg tablet, Take 1 tablet (25 mg total) by mouth once daily., Disp: 90 tablet, Rfl: 0    famotidine (PEPCID) 20 MG tablet, Take 1 tablet (20 mg total) by mouth 2 (two) times daily., Disp: 20 tablet, Rfl: 0    fluticasone-salmeterol diskus inhaler 250-50 mcg, Inhale 1 puff into the lungs 2 (two) times daily. Controller, Disp: 180 each, Rfl: 3    gabapentin (NEURONTIN) 800 MG tablet, Take 1 tablet (800 mg total) by mouth 3 (three) times daily., Disp: 270 tablet, Rfl: 4     insulin aspart U-100 (NOVOLOG FLEXPEN U-100 INSULIN) 100 unit/mL (3 mL) InPn pen, INJECT 10 UNITS SUBCUTANEOUSLY THREE TIMES DAILY WITH MEALS, Disp: 15 mL, Rfl: 0    insulin degludec (TRESIBA FLEXTOUCH U-200) 200 unit/mL (3 mL) insulin pen, Inject 40 Units into the skin once daily. (Patient taking differently: Inject 38 Units into the skin once daily.), Disp: 6 pen, Rfl: 3    ipratropium (ATROVENT) 0.02 % nebulizer solution, INHALE THE CONTENTS OF 1 VIAL VIA NEBULIZER FOUR TIMES DAILY, Disp: 1 Box, Rfl: 0    isosorbide mononitrate (IMDUR) 60 MG 24 hr tablet, Take 1 tablet (60 mg total) by mouth once daily., Disp: 90 tablet, Rfl: 1    lancets Misc, 1 each by Misc.(Non-Drug; Combo Route) route 4 (four) times daily., Disp: 300 each, Rfl: 3    latanoprost 0.005 % ophthalmic solution, INSTILL 1 DROP INTO BOTH EYES ONE TIME DAILY, Disp: 5 mL, Rfl: 3    lisinopriL (PRINIVIL,ZESTRIL) 30 MG tablet, Take 1 tablet by mouth once daily., Disp: , Rfl:     metoprolol succinate (TOPROL-XL) 50 MG 24 hr tablet, Take 1 tablet (50 mg total) by mouth every evening., Disp: 90 tablet, Rfl: 3    OZEMPIC 1 mg/dose (2 mg/1.5 mL) PnIj, Inject 0.75 mLs (1 mg total) into the skin once a week., Disp: 3 mL, Rfl: 11    OZEMPIC 1 mg/dose (4 mg/3 mL), INJECT 1MG SUBCUTANEOUSLY ONE TIME WEEKLY, Disp: 9 pen, Rfl: 1    pantoprazole (PROTONIX) 40 MG tablet, Take 1 tablet (40 mg total) by mouth once daily., Disp: 30 tablet, Rfl: 0    ranolazine (RANEXA) 500 MG Tb12, Take 1 tablet (500 mg total) by mouth 2 (two) times daily., Disp: 60 tablet, Rfl: 11    sertraline (ZOLOFT) 100 MG tablet, Take 1 tablet (100 mg total) by mouth every evening., Disp: 90 tablet, Rfl: 2    tacrolimus (PROTOPIC) 0.1 % ointment, Apply topically 2 (two) times daily., Disp: 100 g, Rfl: 2    tamsulosin (FLOMAX) 0.4 mg Cap, Take 1 capsule (0.4 mg total) by mouth once daily., Disp: 30 capsule, Rfl: 11    tiotropium (SPIRIVA WITH HANDIHALER) 18 mcg inhalation capsule,  "INHALE THE CONTENTS OF 1 CAPSULE ONE TIME DAILY (CONTROLLER), Disp: 90 capsule, Rfl: 3    traMADoL (ULTRAM) 50 mg tablet, Take 1 tablet (50 mg total) by mouth every 8 (eight) hours as needed for Pain., Disp: 15 tablet, Rfl: 0    traZODone (DESYREL) 100 MG tablet, Take 2 tablets (200 mg total) by mouth every evening., Disp: 180 tablet, Rfl: 1    triamcinolone acetonide 0.1% (KENALOG) 0.1 % ointment, Apply topically 2 (two) times daily. Use on legs and back, Disp: 454 g, Rfl: 0    VIOS AEROSOL DELIVERY SYSTEM Shefali, USE AS DIRESTED, Disp: , Rfl: 0    Review of Systems   Constitutional: Negative for chills and fever.   Eyes: Negative for visual disturbance.   Respiratory: Negative for cough and shortness of breath.    Cardiovascular: Negative for chest pain.   Gastrointestinal: Negative for abdominal pain.   Musculoskeletal: Positive for arthralgias and back pain.   Neurological: Negative for dizziness.       Objective:   /86 (BP Location: Left arm, Patient Position: Sitting, BP Method: Medium (Manual))   Pulse 77   Temp 97.9 °F (36.6 °C) (Temporal)   Resp 17   Ht 5' 5" (1.651 m)   Wt 88.5 kg (195 lb 1.7 oz)   SpO2 98%   BMI 32.47 kg/m²      Physical Exam  Vitals reviewed.   Constitutional:       Appearance: He is well-developed.   HENT:      Head: Normocephalic and atraumatic.   Eyes:      Conjunctiva/sclera: Conjunctivae normal.   Cardiovascular:      Rate and Rhythm: Normal rate.   Pulmonary:      Effort: Pulmonary effort is normal. No respiratory distress.   Skin:     General: Skin is warm and dry.      Findings: No rash.   Neurological:      Mental Status: He is alert and oriented to person, place, and time.      Coordination: Coordination normal.   Psychiatric:         Behavior: Behavior normal.         Assessment & Plan     Problem List Items Addressed This Visit        Neuro    Osteoarthritis of spine with radiculopathy, lumbar region    Relevant Orders    Ambulatory referral/consult to " Physical/Occupational Therapy       Orthopedic    Chronic right shoulder pain - Primary    Relevant Orders    Ambulatory referral/consult to Physical/Occupational Therapy        F/u with GI for capsule enterography    Immunizations Administered on Date of Encounter - 3/21/2022     No immunizations on file.           No follow-ups on file.    Disclaimer:  This note may have been prepared using voice recognition software, it may have not been extensively proofed, as such there could be errors within the text such as sound alike errors.

## 2022-03-22 ENCOUNTER — CLINICAL SUPPORT (OUTPATIENT)
Dept: REHABILITATION | Facility: HOSPITAL | Age: 67
End: 2022-03-22
Payer: MEDICARE

## 2022-03-22 ENCOUNTER — HOSPITAL ENCOUNTER (OUTPATIENT)
Facility: HOSPITAL | Age: 67
Discharge: HOME | End: 2022-03-22
Attending: INTERNAL MEDICINE | Admitting: INTERNAL MEDICINE
Payer: MEDICARE

## 2022-03-22 DIAGNOSIS — M25.612 DECREASED RANGE OF MOTION OF LEFT SHOULDER: ICD-10-CM

## 2022-03-22 DIAGNOSIS — R29.898 ARM WEAKNESS: Primary | ICD-10-CM

## 2022-03-22 PROCEDURE — 91110 GI TRC IMG INTRAL ESOPH-ILE: CPT | Performed by: INTERNAL MEDICINE

## 2022-03-22 PROCEDURE — 97110 THERAPEUTIC EXERCISES: CPT

## 2022-03-22 PROCEDURE — 97140 MANUAL THERAPY 1/> REGIONS: CPT

## 2022-03-22 PROCEDURE — 91110 PR GI TRACT CAPSULE ENDOSCOPY: ICD-10-PCS | Mod: 26,,, | Performed by: INTERNAL MEDICINE

## 2022-03-22 PROCEDURE — 91110 GI TRC IMG INTRAL ESOPH-ILE: CPT | Mod: 26,,, | Performed by: INTERNAL MEDICINE

## 2022-03-22 NOTE — PROGRESS NOTES
[facility]   Physical Therapy Treatment Note     Name: Crow FISCHER [facility] Number: [id number]    Therapy Diagnosis:   Encounter Diagnoses   Name Primary?    Arm weakness Yes    Decreased range of motion of left shoulder      Physician: Anthony Alvarado    Visit Date: 3/22/2022      Physician Orders: PT Eval and Treat   Medical Diagnosis from Referral:   M75.102,M12.812 (ICD-10-CM) - Rotator cuff tear arthropathy of left shoulder   Z96.612 (ICD-10-CM) - S/P reverse total shoulder arthroplasty, left      Evaluation Date: 2/3/2022  Authorization Period Expiration: 4/6/2022  Plan of Care Expiration: 4/3/2022  Progress Note Due: 4/3/2022  Visit # / Visits authorized: 9/20 (+1 eval)   FOTO: 1/3    PTA Visit #: 0/5     Precautions: Standard, Diabetes and Fall     Surgery: S/P Left Reverse Total Shoulder - 1/10/2022    Time In: 9:37am  Time Out: 10:05am  Total Billable Time: 28 minutes (Billing reflects 1 on 1 treatment time spent with patient)     SUBJECTIVE     Patient reports: He was not as sore after last visit. He went to the doctor to check on his Right shoulder and back and will be getting a referral soon.   He was compliant with home exercise program with pain.  Response to previous treatment: felt pretty good yesterday.   Functional change: decreased sleep, unable to cook, clean home and hard to shop for groceries    Pain: 5/10     Location: anterior left shoulder joint    OBJECTIVE     Objective Measures updated at progress report unless specified.     Left Shoulder ROM: Flexion-135, Abduction-135, External Rotation- 90, Internal Rotation-45    TREATMENT     Crow received the treatments listed below:     MANUAL THERAPY TECHNIQUES were applied for (8) minutes, including:    Manual Intervention Performed Today    Soft Tissue Mobilization  left supraspinatus , infraspinatus , teres major and minor , subscapularis  and deltoid   Joint Mobilizations     Mobilization  with movement x PROM Flexion, Abduction, and Rotation    Subscapular release  Left   Functional Dry Needling        Plan for Next Visit:      All passive range was performed to his tolerance in supine.     THERAPEUTIC EXERCISES to develop strength, endurance, ROM and flexibility for (20) minutes including:    Intervention Performed Today    Bicep Curls  3 minutes    Table slides    3  minutes Flexion  3 minutes Scaption   Scap Retractions  3x10 red band   Shoulder Isometrics x 2 Minutes all direction   Pulleys Flexion and Abduction  x 3 minutes each    Wand Flexion and Abduction  - 3 minutes each   Shoulder Flexion  2 minutes   Wall Walks x 2 minutes      Plan for Next Visit: advance toward upright exercises       PATIENT EDUCATION AND HOME EXERCISES     Home Exercises Provided and Patient Education Provided     Education provided: (during session) minutes   Patient educated on biomechanical justification for therapeutic exercise and importance of compliance with HEP in order to improve overall impairments and QOL    Patient was educated on all the above exercise prior/during/after for proper posture, positioning, and execution for safe performance with home exercise program.     Written Home Exercises Provided: yes.  Exercises were reviewed and Crow was able to demonstrate them prior to the end of the session.  Crow demonstrated good  understanding of the education provided. See EMR under Patient Instructions for exercises provided during therapy sessions.      ASSESSMENT     Wall walks were added today to work on more active assisted range of motion. He was advised to start working on walk walks at home to help progress in range of motion. Overall, he tolerated today's treatment well.     Crow is progressing well towards his goals.   Pt prognosis is Fair.     Pt will continue to benefit from skilled outpatient physical therapy to address the deficits listed in the problem list box on initial evaluation,  provide pt/family education and to maximize pt's level of independence in the home and community environment.     Pt's spiritual, cultural and educational needs considered and pt agreeable to plan of care and goals.       Anticipated Barriers for therapy: chronic shoulder pain, prior inconsistency with therapy       Goals:  Short Term Goals: 4 weeks   1. Patient will improve shoulder flexion to 135 degrees in order to come out of sling.  2. Patient will improve shoulder abduction to 115 degrees in order to start light active range.  3. Patient will be independent with HEP in order to further progress and return to maximal function.  4. Pain rating at Worst: 5/10 in order to progress towards increased independence with activity.  5. Patient will improve postural awareness.      Long Term Goals: 8 weeks   1. Patient will improve shoulder strength to 4-/5 in order to perform ADLs with left arm.  2. Patient will improve elbow strength to 4/5 in order to hold small items.  3. Patient will improve shoulder flexion to 150 degrees in order to reach overhead.   4. Patient will improve shoulder abduciton to 145 degrees in order to reach into cabinets.   5. Patient will self report improvement to 40% limitation on the FOTO Shoulder Survey.    Goals Key:  PC= progressing/continue; PM= partially met;        DC= discontinue    PLAN     Continue Plan of Care (POC) and progress per patient tolerance. See treatment section for details on planned progressions next session.      Chris Cardona, PT, DPT

## 2022-03-22 NOTE — OR NURSING
Patient arrived for Video Capsule Endoscopy. Two patient identifier verified. Patient verbalized adequate prep. Reviewed the procedure w/ the patient, provided instructions, and answered all questions. Patient verbalized understanding. Dietary restrictions explained. PillCam swallowed at  0750.  Lot# [id number], Capsule ID# [id number], Exp: 7/12/2023. Pt instructed to return at 1550. PillCam visualized in the pt's stomach prior to discharge. No distress noted.

## 2022-03-24 ENCOUNTER — CLINICAL SUPPORT (OUTPATIENT)
Dept: REHABILITATION | Facility: HOSPITAL | Age: 67
End: 2022-03-24
Payer: MEDICARE

## 2022-03-24 ENCOUNTER — TELEPHONE (OUTPATIENT)
Dept: DIABETES | Facility: CLINIC | Age: 67
End: 2022-03-24
Payer: MEDICARE

## 2022-03-24 DIAGNOSIS — M25.612 DECREASED RANGE OF MOTION OF LEFT SHOULDER: ICD-10-CM

## 2022-03-24 DIAGNOSIS — R29.898 ARM WEAKNESS: Primary | ICD-10-CM

## 2022-03-24 PROCEDURE — 97110 THERAPEUTIC EXERCISES: CPT

## 2022-03-24 PROCEDURE — 97140 MANUAL THERAPY 1/> REGIONS: CPT

## 2022-03-24 NOTE — PROGRESS NOTES
[facility]   Physical Therapy Treatment Note     Name: Crow FISCHER [facility] Number: [id number]    Therapy Diagnosis:   Encounter Diagnoses   Name Primary?    Arm weakness Yes    Decreased range of motion of left shoulder      Physician: Anthony Alvarado    Visit Date: 3/24/2022      Physician Orders: PT Eval and Treat   Medical Diagnosis from Referral:   M75.102,M12.812 (ICD-10-CM) - Rotator cuff tear arthropathy of left shoulder   Z96.612 (ICD-10-CM) - S/P reverse total shoulder arthroplasty, left      Evaluation Date: 2/3/2022  Authorization Period Expiration: 4/6/2022  Plan of Care Expiration: 4/3/2022  Progress Note Due: 4/3/2022  Visit # / Visits authorized: 10/20 (+1 eval)   FOTO: 1/3    PTA Visit #: 0/5     Precautions: Standard, Diabetes and Fall     Surgery: S/P Left Reverse Total Shoulder - 1/10/2022    Time In: 9:18am  Time Out: 10:00am  Total Billable Time: 40 minutes (Billing reflects 1 on 1 treatment time spent with patient)     SUBJECTIVE     Patient reports: He is feeling alright today.   He was compliant with home exercise program with pain.  Response to previous treatment: felt pretty good yesterday.   Functional change: decreased sleep, unable to cook, clean home and hard to shop for groceries    Pain: 5/10     Location: anterior left shoulder joint    OBJECTIVE     Objective Measures updated at progress report unless specified.     Left Shoulder ROM: Flexion-135, Abduction-135, External Rotation- 90, Internal Rotation-45    TREATMENT     Crow received the treatments listed below:     MANUAL THERAPY TECHNIQUES were applied for (10) minutes, including:    Manual Intervention Performed Today    Soft Tissue Mobilization  left supraspinatus , infraspinatus , teres major and minor , subscapularis  and deltoid   Joint Mobilizations     Mobilization with movement x PROM Flexion, Abduction, and Rotation    Subscapular release  Left   Functional Dry Needling         Plan for Next Visit:      All passive range was performed to his tolerance in supine.     THERAPEUTIC EXERCISES to develop strength, endurance, ROM and flexibility for (30) minutes including:    Intervention Performed Today    Scap Retractions x 3 minutes red band   Shoulder Isometrics x 2 minutes all direction   Pulleys Flexion and Abduction  x 3 minutes each    Wand Flexion and Abduction  x 2 minutes each   Shoulder Flexion  2 minutes   Wall Walks Flexion and Scaption x 2 minutes each     Plan for Next Visit: advance toward upright exercises       PATIENT EDUCATION AND HOME EXERCISES     Home Exercises Provided and Patient Education Provided     Education provided: (during session) minutes   Patient educated on biomechanical justification for therapeutic exercise and importance of compliance with HEP in order to improve overall impairments and QOL    Patient was educated on all the above exercise prior/during/after for proper posture, positioning, and execution for safe performance with home exercise program.     Written Home Exercises Provided: yes.  Exercises were reviewed and Crow was able to demonstrate them prior to the end of the session.  Crow demonstrated good  understanding of the education provided. See EMR under Patient Instructions for exercises provided during therapy sessions.      ASSESSMENT     Mr Maria tolerated today's treatment well. Patient's range of motion is improving. He was advised to keep working on his exercises at home to help regain strength.     Crow is progressing well towards his goals.   Pt prognosis is Fair.     Pt will continue to benefit from skilled outpatient physical therapy to address the deficits listed in the problem list box on initial evaluation, provide pt/family education and to maximize pt's level of independence in the home and community environment.     Pt's spiritual, cultural and educational needs considered and pt agreeable to plan of care and  goals.       Anticipated Barriers for therapy: chronic shoulder pain, prior inconsistency with therapy       Goals:  Short Term Goals: 4 weeks   1. Patient will improve shoulder flexion to 135 degrees in order to come out of sling. MET  2. Patient will improve shoulder abduction to 115 degrees in order to start light active range. MET  3. Patient will be independent with HEP in order to further progress and return to maximal function. MET  4. Pain rating at Worst: 5/10 in order to progress towards increased independence with activity. PROGRESSING  5. Patient will improve postural awareness. PROGRESSING     Long Term Goals: 8 weeks   1. Patient will improve shoulder strength to 4-/5 in order to perform ADLs with left arm.  2. Patient will improve elbow strength to 4/5 in order to hold small items.  3. Patient will improve shoulder flexion to 150 degrees in order to reach overhead.   4. Patient will improve shoulder abduciton to 145 degrees in order to reach into cabinets.   5. Patient will self report improvement to 40% limitation on the FOTO Shoulder Survey.      PLAN     Continue Plan of Care (POC) and progress per patient tolerance. See treatment section for details on planned progressions next session.      Rupert Sanchez, PT, DPT

## 2022-03-24 NOTE — TELEPHONE ENCOUNTER
LM with call back number  ----- Message from Briana Mujica MA sent at 3/24/2022 12:22 PM CDT -----  Contact: Crow Wright would like to speak w/ you  ----- Message -----  From: Sangeetha Xie  Sent: 3/24/2022  12:18 PM CDT  To: [facility] Staff    Crow is needing a call back regarding scheduling an appointment to come in for his next Dexcom change as it is time for a new one. Please call him back at [phone]

## 2022-03-29 ENCOUNTER — HOSPITAL ENCOUNTER (OUTPATIENT)
Dept: RADIOLOGY | Facility: HOSPITAL | Age: 67
Discharge: HOME | End: 2022-03-29
Attending: STUDENT IN AN ORGANIZED HEALTH CARE EDUCATION/TRAINING PROGRAM
Payer: MEDICARE

## 2022-03-29 ENCOUNTER — TELEPHONE (OUTPATIENT)
Dept: INTERNAL MEDICINE | Facility: CLINIC | Age: 67
End: 2022-03-29
Payer: MEDICARE

## 2022-03-29 ENCOUNTER — CLINICAL SUPPORT (OUTPATIENT)
Dept: REHABILITATION | Facility: HOSPITAL | Age: 67
End: 2022-03-29
Payer: MEDICARE

## 2022-03-29 DIAGNOSIS — M25.612 DECREASED RANGE OF MOTION OF LEFT SHOULDER: ICD-10-CM
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
## 2022-03-29 DIAGNOSIS — Z96.612 S/P REVERSE TOTAL SHOULDER ARTHROPLASTY, LEFT: ICD-10-CM

## 2022-03-29 DIAGNOSIS — R29.898 ARM WEAKNESS: Primary | ICD-10-CM
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
## 2022-03-29 LAB — POCT GLUCOSE: 78 MG/DL (ref 70–110)

## 2022-03-29 PROCEDURE — 71046 XR CHEST PA AND LATERAL PRE-OP: ICD-10-PCS | Mod: 26,,, | Performed by: RADIOLOGY

## 2022-03-29 PROCEDURE — 71046 X-RAY EXAM CHEST 2 VIEWS: CPT | Mod: 26,,, | Performed by: RADIOLOGY

## 2022-03-29 PROCEDURE — 97110 THERAPEUTIC EXERCISES: CPT | Mod: CQ

## 2022-03-29 PROCEDURE — 73030 XR SHOULDER COMPLETE 2 OR MORE VIEWS LEFT: ICD-10-PCS | Mod: 26,LT,, | Performed by: RADIOLOGY

## 2022-03-29 PROCEDURE — 71046 X-RAY EXAM CHEST 2 VIEWS: CPT | Mod: TC

## 2022-03-29 PROCEDURE — 73030 X-RAY EXAM OF SHOULDER: CPT | Mod: TC,LT

## 2022-03-29 PROCEDURE — 73030 X-RAY EXAM OF SHOULDER: CPT | Mod: 26,LT,, | Performed by: RADIOLOGY

## 2022-03-29 NOTE — PROGRESS NOTES
[facility]   Physical Therapy Treatment Note     Name: Crow FISCHER [facility]  Clinic Number: [id number]    Therapy Diagnosis:   Encounter Diagnoses   Name Primary?    Arm weakness Yes    Decreased range of motion of left shoulder      Physician: Anthony Alvarado    Visit Date: 3/29/2022      Physician Orders: PT Eval and Treat   Medical Diagnosis from Referral:   M75.102,M12.812 (ICD-10-CM) - Rotator cuff tear arthropathy of left shoulder   Z96.612 (ICD-10-CM) - S/P reverse total shoulder arthroplasty, left      Evaluation Date: 2/3/2022  Authorization Period Expiration: 4/6/2022  Plan of Care Expiration: 4/3/2022  Progress Note Due: 4/3/2022  Visit # / Visits authorized: 11/20 (+1 eval)   FOTO: 1/3    PTA Visit #: 1/5     Precautions: Standard, Diabetes and Fall     Surgery: S/P Left Reverse Total Shoulder - 1/10/2022    Time In: 10:00am  Time Out: 10:30am  Total Billable Time: 15 minutes (Billing reflects 1 on 1 treatment time spent with patient)     SUBJECTIVE     Patient reports: he had a fall last week(tuesday = 7 days ago)when going to get on his bicycle at home. He states his left rib area has been bothering him the last week. He just came from xray for the left shoulder. He states his rib pain has not improved in the last week, so he plans to call his MD and hopes to get an xray. Whenever he breathes in deep, it causes increased pain.   He was compliant with home exercise program with pain.  Response to previous treatment: felt pretty good yesterday.   Functional change: decreased sleep, unable to cook, clean home and hard to shop for groceries    Pain: 5/10     Location: anterior left shoulder joint    OBJECTIVE     Objective Measures updated at progress report unless specified.     Left Shoulder ROM: Flexion-135, Abduction-135, External Rotation- 90, Internal Rotation-45    TREATMENT     Crow received the treatments listed below:     MANUAL THERAPY TECHNIQUES were  applied for (0) minutes, including:    Manual Intervention Performed Today    Soft Tissue Mobilization  left supraspinatus , infraspinatus , teres major and minor , subscapularis  and deltoid   Joint Mobilizations     Mobilization with movement  PROM Flexion, Abduction, and Rotation    Subscapular release  Left   Functional Dry Needling        Plan for Next Visit:      All passive range was performed to his tolerance in supine.     THERAPEUTIC EXERCISES to develop strength, endurance, ROM and flexibility for (15) minutes including:    Intervention Performed Today    Scap Retractions x 3 minutes red band   Shoulder Isometrics  2 minutes all direction   Pulleys Flexion and Abduction  x 3 minutes each    Wand Flexion and Abduction  x 3 minutes each   Shoulder Flexion  2 minutes   Wall Walks Flexion and Scaption  2 minutes each     Plan for Next Visit: advance toward upright exercises       PATIENT EDUCATION AND HOME EXERCISES     Home Exercises Provided and Patient Education Provided     Education provided: (during session) minutes   Patient educated on biomechanical justification for therapeutic exercise and importance of compliance with HEP in order to improve overall impairments and QOL    Patient was educated on all the above exercise prior/during/after for proper posture, positioning, and execution for safe performance with home exercise program.     Written Home Exercises Provided: yes.  Exercises were reviewed and Crow was able to demonstrate them prior to the end of the session.  Crow demonstrated good  understanding of the education provided. See EMR under Patient Instructions for exercises provided during therapy sessions.      ASSESSMENT     Patient mentioned fall last week and reported increased left rib area pain which has not improved. Briana Lewis DPT, spoke to patient about injuries from fall. No noticeable injuries with palpation, tenderness noted. Briana spoke to Dr. Alvarado about fall,  [facility] ordered a chest xray to rule out a rib fracture. Patient with noticeable increased rib pain with most activities today, including increased pain with deep breaths. Session cut short and patient was escorted to imaging for a chest xray.    Crow is progressing well towards his goals.   Pt prognosis is Fair.     Pt will continue to benefit from skilled outpatient physical therapy to address the deficits listed in the problem list box on initial evaluation, provide pt/family education and to maximize pt's level of independence in the home and community environment.     Pt's spiritual, cultural and educational needs considered and pt agreeable to plan of care and goals.       Anticipated Barriers for therapy: chronic shoulder pain, prior inconsistency with therapy       Goals:  Short Term Goals: 4 weeks   1. Patient will improve shoulder flexion to 135 degrees in order to come out of sling. MET  2. Patient will improve shoulder abduction to 115 degrees in order to start light active range. MET  3. Patient will be independent with HEP in order to further progress and return to maximal function. MET  4. Pain rating at Worst: 5/10 in order to progress towards increased independence with activity. PROGRESSING  5. Patient will improve postural awareness. PROGRESSING     Long Term Goals: 8 weeks   1. Patient will improve shoulder strength to 4-/5 in order to perform ADLs with left arm.  2. Patient will improve elbow strength to 4/5 in order to hold small items.  3. Patient will improve shoulder flexion to 150 degrees in order to reach overhead.   4. Patient will improve shoulder abduciton to 145 degrees in order to reach into cabinets.   5. Patient will self report improvement to 40% limitation on the FOTO Shoulder Survey.      PLAN     Continue Plan of Care (POC) and progress per patient tolerance. See treatment section for details on planned progressions next session.      Carol Lao, JANESSA

## 2022-03-29 NOTE — TELEPHONE ENCOUNTER
----- Message from Celia Valdez sent at 3/29/2022 10:43 AM CDT -----  Patient would like a all back at [phone] in regard to getting an XRAY of his right shoulder.    Thanks

## 2022-03-29 NOTE — PROVATION PATIENT INSTRUCTIONS
Discharge Summary/Instructions after an Endoscopic Procedure  Patient Name: Crow Green  Patient MRN: [id number]  Patient YOB: 1955 Tuesday, March 22, 2022 Yolis Freitas MD  Dear patient,  As a result of recent federal legislation (The Federal Cures Act), you may   receive lab or pathology results from your procedure in your [facility]   account before your physician is able to contact you. Your physician or   their representative will relay the results to you with their   recommendations at their soonest availability.  Thank you,  RESTRICTIONS:  During your procedure today, you received medications for sedation.  These   medications may affect your judgment, balance and coordination.  Therefore,   for 24 hours, you have the following restrictions:   - DO NOT drive a car, operate machinery, make legal/financial decisions,   sign important papers or drink alcohol.    ACTIVITY:  Today: no heavy lifting, straining or running due to procedural   sedation/anesthesia.  The following day: return to full activity including work.  DIET:  Eat and drink normally unless instructed otherwise.     TREATMENT FOR COMMON SIDE EFFECTS:  - Mild abdominal pain, nausea, belching, bloating or excessive gas:  rest,   eat lightly and use a heating pad.  - Sore Throat: treat with throat lozenges and/or gargle with warm salt   water.  - Because air was used during the procedure, expelling large amounts of air   from your rectum or belching is normal.  - If a bowel prep was taken, you may not have a bowel movement for 1-3 days.    This is normal.  SYMPTOMS TO WATCH FOR AND REPORT TO YOUR PHYSICIAN:  1. Abdominal pain or bloating, other than gas cramps.  2. Chest pain.  3. Back pain.  4. Signs of infection such as: chills or fever occurring within 24 hours   after the procedure.  5. Rectal bleeding, which would show as bright red, maroon, or black stools.   (A tablespoon of blood from the rectum is not serious,  especially if   hemorrhoids are present.)  6. Vomiting.  7. Weakness or dizziness.  GO DIRECTLY TO THE NEAREST EMERGENCY ROOM IF YOU HAVE ANY OF THE FOLLOWING:      Difficulty breathing              Chills and/or fever over 101 F   Persistent vomiting and/or vomiting blood   Severe abdominal pain   Severe chest pain   Black, tarry stools   Bleeding- more than one tablespoon   Any other symptom or condition that you feel may need urgent attention  Your doctor recommends these additional instructions:  If any biopsies were taken, your doctors clinic will contact you in 1 to 2   weeks with any results.  - Continue present medications.  For questions, problems or results please call your physician Yolis Freitas MD at Work:  [phone]  If you have any questions about the above instructions, call the GI   department at [phone] or call the endoscopy unit at [phone]   from 7am until 3 pm.  [facility], EMERGENCY ROOM PHONE NUMBER:   [phone]  IF A COMPLICATION OR EMERGENCY SITUATION ARISES AND YOU ARE UNABLE TO REACH   YOUR PHYSICIAN - GO DIRECTLY TO THE EMERGENCY ROOM.  I have read or have had read to me these discharge instructions for my   procedure and have received a written copy.  I understand these   instructions and will follow-up with my physician if I have any questions.     __________________________________       _____________________________________  Nurse Signature                                          Patient/Designated   Responsible Party Signature  MD Yolis Jin MD  3/29/2022 9:28:13 AM  [employer]ATION

## 2022-03-30 ENCOUNTER — TELEPHONE (OUTPATIENT)
Dept: PRIMARY CARE CLINIC | Facility: CLINIC | Age: 67
End: 2022-03-30
Payer: MEDICARE

## 2022-03-30 NOTE — PROGRESS NOTES
Behavioral Health Community Health Worker  Follow-Up  Completed by:  Ce Nagy    Date:  3/30/2022    Patient Enrollment in Behavioral Health Program:  · Crow Green was enrolled in the Behavioral Health Program on 10-5-2020. Mr. Green was moved to the Maintenance Phase of the Behavioral Health Program on 10-5-2021.    Assessments     Promis 10:  PROMIS-10 Questionnaire Scores 3/30/2022   Global Physical Health 19   Global Mental health Score 9       Depression PHQ:  PHQ9 3/30/2022   Total Score 22       Generalized Anxiety Disorder 7-Item Scale:  GAD7 3/30/2022   1. Feeling nervous, anxious, or on edge? 1   2. Not being able to stop or control worrying? 3   3. Worrying too much about different things? 1   4. Trouble relaxing? 3   5. Being so restless that it is hard to sit still? 3   6. Becoming easily annoyed or irritable? 3   7. Feeling afraid as if something awful might happen? 0   8. If you checked off any problems, how difficult have these problems made it for you to do your work, take care of things at home, or get along with other people? 1   DOMINIK-7 Score 14           Call Summary     CHW contacted Mr. Green to do the six month Maintenance Phase follow up visit with the I program. Patient completed the Promis 10, PHQ-9 and DOMINIK-7 assessments. Patient stated he has had passive thoughts of being better off dead because his pain is not getting better and sometimes he just feel like giving up. Mr. Green clearly stated he has no plans to commit suicide or hurt himself in any way at all. Patient expressed thanks for the follow up call.

## 2022-03-31 ENCOUNTER — TELEPHONE (OUTPATIENT)
Dept: DIABETES | Facility: CLINIC | Age: 67
End: 2022-03-31
Payer: MEDICARE

## 2022-03-31 DIAGNOSIS — E11.65 UNCONTROLLED TYPE 2 DIABETES MELLITUS WITH HYPERGLYCEMIA: ICD-10-CM

## 2022-03-31 RX ORDER — LANCETS
1 EACH MISCELLANEOUS 4 TIMES DAILY
Qty: 300 EACH | Refills: 3 | Status: SHIPPED | OUTPATIENT
Start: 2022-03-31 | End: 2022-04-11 | Stop reason: SDUPTHER

## 2022-04-01 ENCOUNTER — TELEPHONE (OUTPATIENT)
Dept: PRIMARY CARE CLINIC | Facility: CLINIC | Age: 67
End: 2022-04-01
Payer: MEDICARE

## 2022-04-01 NOTE — PROGRESS NOTES
Ce Nagy contacted Mr. Green to provide a resource for counseling. CHW provided patient with number for [facility], [place]. Patient made note of the number and stated he will call. Patient requested an appointment with Awilda Nogueira LCSW. CHW scheduled an appointment for 4-7-2022 at 1:00 pm.

## 2022-04-06 ENCOUNTER — CLINICAL SUPPORT (OUTPATIENT)
Dept: REHABILITATION | Facility: HOSPITAL | Age: 67
End: 2022-04-06
Payer: MEDICARE

## 2022-04-06 ENCOUNTER — DOCUMENTATION ONLY (OUTPATIENT)
Dept: REHABILITATION | Facility: HOSPITAL | Age: 67
End: 2022-04-06
Payer: MEDICARE

## 2022-04-06 ENCOUNTER — TELEPHONE (OUTPATIENT)
Dept: PRIMARY CARE CLINIC | Facility: CLINIC | Age: 67
End: 2022-04-06
Payer: MEDICARE

## 2022-04-06 DIAGNOSIS — M25.612 DECREASED RANGE OF MOTION OF LEFT SHOULDER: ICD-10-CM

## 2022-04-06 DIAGNOSIS — R29.898 ARM WEAKNESS: Primary | ICD-10-CM

## 2022-04-06 PROCEDURE — 97110 THERAPEUTIC EXERCISES: CPT | Mod: CQ

## 2022-04-06 NOTE — PROGRESS NOTES
[facility]   Physical Therapy Treatment Note    Name: Crow FISCHER [facility]  Clinic Number: [id number]    Therapy Diagnosis:   Encounter Diagnoses   Name Primary?    Arm weakness Yes    Decreased range of motion of left shoulder      Physician: Anthony Alvarado    Visit Date: 4/6/2022      Physician Orders: PT Eval and Treat   Medical Diagnosis from Referral:   M75.102,M12.812 (ICD-10-CM) - Rotator cuff tear arthropathy of left shoulder   Z96.612 (ICD-10-CM) - S/P reverse total shoulder arthroplasty, left      Evaluation Date: 2/3/2022  Authorization Period Expiration: 4/6/2022  Plan of Care Expiration: 4/3/2022  Progress Note Due: 4/3/2022  Visit # / Visits authorized: 12/20 (+1 eval)   FOTO: 1/3    PTA Visit #: 2/5     Precautions: Standard, Diabetes and Fall     Surgery: S/P Left Reverse Total Shoulder - 1/10/2022    Time In: 10:00am  Time Out: 10:43am  Total Billable Time: 38 minutes (Billing reflects 1 on 1 treatment time spent with patient)     SUBJECTIVE     Patient reports: states he never heard anything back about his xray. He is still having tenderness in his left rib area. He did not come on Monday because he could not breathe well. He states breathing causes increased pain in that left rib area, but he does not feel like he is short of breath. He has just been resting.   He was compliant with home exercise program with pain.  Response to previous treatment: good  Functional change: decreased sleep, unable to cook, clean home and hard to shop for groceries    Pain: 7/10     Location: anterior left shoulder joint    OBJECTIVE     Objective Measures updated at progress report unless specified.     Left Shoulder ROM: Flexion-135, Abduction-135, External Rotation- 90, Internal Rotation-45    TREATMENT     Crow received the treatments listed below:     MANUAL THERAPY TECHNIQUES were applied for (0) minutes, including:    Manual Intervention Performed Today    Soft  Tissue Mobilization  left supraspinatus , infraspinatus , teres major and minor , subscapularis  and deltoid   Joint Mobilizations     Mobilization with movement  PROM Flexion, Abduction, and Rotation    Subscapular release  Left   Functional Dry Needling        Plan for Next Visit:      All passive range was performed to his tolerance in supine.     THERAPEUTIC EXERCISES to develop strength, endurance, ROM and flexibility for (38) minutes including:    Intervention Performed Today    Scap Retractions x 3 minutes red band   Shoulder Isometrics x 20x each internal/external/extension elbow at 90, seated today with therapist movement walkaways   Pulleys Flexion and Abduction  x 3 minutes each    Wand Flexion and Abduction seated x 3 minutes each   Shoulder Flexion seated x 3 minutes   Wall Walks Flexion and Scaption x 10x each   Bicep curls x 30x   Corner pec stretch x 10x 10x     Plan for Next Visit: advance toward upright exercises       PATIENT EDUCATION AND HOME EXERCISES     Home Exercises Provided and Patient Education Provided     Education provided: (during session) minutes   Patient educated on biomechanical justification for therapeutic exercise and importance of compliance with HEP in order to improve overall impairments and QOL    Patient was educated on all the above exercise prior/during/after for proper posture, positioning, and execution for safe performance with home exercise program.     Written Home Exercises Provided: yes.  Exercises were reviewed and Crow was able to demonstrate them prior to the end of the session.  Crow demonstrated good  understanding of the education provided. See EMR under Patient Instructions for exercises provided during therapy sessions.      ASSESSMENT     Patient with reports of discomfort with active range of motion today. Verbal cues necessary for proper breathing during activities to decreased rib pain with heavy quick breathing noted after performing each  repetition. Tactile cues for decreased upper trap activation throughout session. Patient left session with increased soreness.     Crow is progressing well towards his goals.   Pt prognosis is Fair.     Pt will continue to benefit from skilled outpatient physical therapy to address the deficits listed in the problem list box on initial evaluation, provide pt/family education and to maximize pt's level of independence in the home and community environment.     Pt's spiritual, cultural and educational needs considered and pt agreeable to plan of care and goals.       Anticipated Barriers for therapy: chronic shoulder pain, prior inconsistency with therapy       Goals:  Short Term Goals: 4 weeks   1. Patient will improve shoulder flexion to 135 degrees in order to come out of sling. MET  2. Patient will improve shoulder abduction to 115 degrees in order to start light active range. MET  3. Patient will be independent with HEP in order to further progress and return to maximal function. MET  4. Pain rating at Worst: 5/10 in order to progress towards increased independence with activity. PROGRESSING  5. Patient will improve postural awareness. PROGRESSING     Long Term Goals: 8 weeks   1. Patient will improve shoulder strength to 4-/5 in order to perform ADLs with left arm.  2. Patient will improve elbow strength to 4/5 in order to hold small items.  3. Patient will improve shoulder flexion to 150 degrees in order to reach overhead.   4. Patient will improve shoulder abduciton to 145 degrees in order to reach into cabinets.   5. Patient will self report improvement to 40% limitation on the FOTO Shoulder Survey.      PLAN     Continue Plan of Care (POC) and progress per patient tolerance. See treatment section for details on planned progressions next session.      Carol Lao, PTA

## 2022-04-06 NOTE — PROGRESS NOTES
JOLIE Hair attempted to contact Mr. Green to remind patient of his appointment tomorrow, 4-7-2022 with Awilda Nogueira LCSW. IVANW left a voicemail message with appointment date and time.    CHW contacted Mr. Green today, 4-7-2022 and patient confirmed the appointment.

## 2022-04-06 NOTE — PROGRESS NOTES
PT/PTA met face to face to discuss patient's treatment plan and progress towards established goals. Patient will be seen by physical therapist every sixth visit and minimally once per month.     Carol Lao PTA

## 2022-04-07 ENCOUNTER — CLINICAL SUPPORT (OUTPATIENT)
Dept: PRIMARY CARE CLINIC | Facility: CLINIC | Age: 67
End: 2022-04-07
Payer: MEDICARE

## 2022-04-07 ENCOUNTER — TELEPHONE (OUTPATIENT)
Dept: INTERNAL MEDICINE | Facility: CLINIC | Age: 67
End: 2022-04-07
Payer: MEDICARE

## 2022-04-07 DIAGNOSIS — F33.1 MDD (MAJOR DEPRESSIVE DISORDER), RECURRENT EPISODE, MODERATE: Primary | ICD-10-CM

## 2022-04-07 DIAGNOSIS — M25.512 CHRONIC LEFT SHOULDER PAIN: ICD-10-CM

## 2022-04-07 DIAGNOSIS — F41.1 GAD (GENERALIZED ANXIETY DISORDER): ICD-10-CM

## 2022-04-07 DIAGNOSIS — G89.29 CHRONIC LEFT SHOULDER PAIN: ICD-10-CM

## 2022-04-07 PROCEDURE — 90834 PSYTX W PT 45 MINUTES: CPT | Mod: BHI,FQ,95, | Performed by: SOCIAL WORKER

## 2022-04-07 PROCEDURE — 90834 PR PSYCHOTHERAPY W/PATIENT, 45 MIN: ICD-10-PCS | Mod: BHI,FQ,95, | Performed by: SOCIAL WORKER

## 2022-04-07 NOTE — TELEPHONE ENCOUNTER
----- Message from Sangeetha Xie sent at 4/7/2022  4:27 PM CDT -----  Contact: Crow  Type:  Patient Returning Call    Who Called: Crow  Who Left Message for Patient: Chika  Does the patient know what this is regarding?: Transportation  Would the patient rather a call back or a response via [facility]? Call back  Best Call Back Number:[phone]  Additional Information:

## 2022-04-07 NOTE — TELEPHONE ENCOUNTER
----- Message from Cheryl Hull sent at 4/7/2022  1:52 PM CDT -----  Contact: pt  The pt request a return call, no additional info given and can be reached at [phone]///thxMW

## 2022-04-11 ENCOUNTER — OFFICE VISIT (OUTPATIENT)
Dept: INTERNAL MEDICINE | Facility: CLINIC | Age: 67
End: 2022-04-11
Payer: MEDICARE

## 2022-04-11 ENCOUNTER — HOSPITAL ENCOUNTER (OUTPATIENT)
Dept: RADIOLOGY | Facility: HOSPITAL | Age: 67
Discharge: HOME | End: 2022-04-11
Attending: NURSE PRACTITIONER
Payer: MEDICARE

## 2022-04-11 VITALS
BODY MASS INDEX: 32.06 KG/M2 | HEIGHT: 65 IN | SYSTOLIC BLOOD PRESSURE: 138 MMHG | TEMPERATURE: 99 F | DIASTOLIC BLOOD PRESSURE: 70 MMHG | OXYGEN SATURATION: 96 % | HEART RATE: 71 BPM | WEIGHT: 192.44 LBS

## 2022-04-11 DIAGNOSIS — R07.81 RIB PAIN ON LEFT SIDE: ICD-10-CM

## 2022-04-11 DIAGNOSIS — I10 ESSENTIAL HYPERTENSION: ICD-10-CM

## 2022-04-11 DIAGNOSIS — R05.9 COUGH: ICD-10-CM

## 2022-04-11 DIAGNOSIS — G89.29 CHRONIC RIGHT SHOULDER PAIN: ICD-10-CM

## 2022-04-11 DIAGNOSIS — M25.511 CHRONIC RIGHT SHOULDER PAIN: ICD-10-CM

## 2022-04-11 DIAGNOSIS — I50.42 CHRONIC COMBINED SYSTOLIC AND DIASTOLIC CONGESTIVE HEART FAILURE: ICD-10-CM

## 2022-04-11 DIAGNOSIS — J44.9 CHRONIC OBSTRUCTIVE PULMONARY DISEASE, UNSPECIFIED COPD TYPE: Primary | ICD-10-CM

## 2022-04-11 PROCEDURE — 71046 X-RAY EXAM CHEST 2 VIEWS: CPT | Mod: TC,PO

## 2022-04-11 PROCEDURE — 99499 RISK ADDL DX/OHS AUDIT: ICD-10-PCS | Mod: S$GLB,,, | Performed by: NURSE PRACTITIONER

## 2022-04-11 PROCEDURE — 71046 X-RAY EXAM CHEST 2 VIEWS: CPT | Mod: 26,,, | Performed by: RADIOLOGY

## 2022-04-11 PROCEDURE — 99214 OFFICE O/P EST MOD 30 MIN: CPT | Mod: S$PBB,,, | Performed by: NURSE PRACTITIONER

## 2022-04-11 PROCEDURE — 99499 UNLISTED E&M SERVICE: CPT | Mod: S$GLB,,, | Performed by: NURSE PRACTITIONER

## 2022-04-11 PROCEDURE — 99999 PR PBB SHADOW E&M-EST. PATIENT-LVL V: CPT | Mod: PBBFAC,,, | Performed by: NURSE PRACTITIONER

## 2022-04-11 PROCEDURE — 99999 PR PBB SHADOW E&M-EST. PATIENT-LVL V: ICD-10-PCS | Mod: PBBFAC,,, | Performed by: NURSE PRACTITIONER

## 2022-04-11 PROCEDURE — 71046 XR CHEST PA AND LATERAL: ICD-10-PCS | Mod: 26,,, | Performed by: RADIOLOGY

## 2022-04-11 PROCEDURE — 99214 PR OFFICE/OUTPT VISIT, EST, LEVL IV, 30-39 MIN: ICD-10-PCS | Mod: S$PBB,,, | Performed by: NURSE PRACTITIONER

## 2022-04-11 RX ORDER — CALCIUM CARB/VITAMIN D3/VIT K1 500-500-40
TABLET,CHEWABLE ORAL
COMMUNITY
Start: 2022-03-31 | End: 2023-07-13 | Stop reason: SDUPTHER
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
## 2022-04-13 DIAGNOSIS — J44.9 CHRONIC OBSTRUCTIVE PULMONARY DISEASE, UNSPECIFIED COPD TYPE: Primary | ICD-10-CM

## 2022-04-13 RX ORDER — PREDNISONE 20 MG/1
20 TABLET ORAL DAILY
Qty: 5 TABLET | Refills: 0 | Status: SHIPPED | OUTPATIENT
Start: 2022-04-13 | End: 2022-04-18

## 2022-04-13 RX ORDER — AZITHROMYCIN 250 MG/1
TABLET, FILM COATED ORAL
Qty: 6 TABLET | Refills: 0 | Status: SHIPPED | OUTPATIENT
Start: 2022-04-13 | End: 2022-06-09

## 2022-04-18 ENCOUNTER — PATIENT MESSAGE (OUTPATIENT)
Dept: ADMINISTRATIVE | Facility: OTHER | Age: 67
End: 2022-04-18
Payer: MEDICARE

## 2022-04-18 ENCOUNTER — TELEPHONE (OUTPATIENT)
Dept: DIABETES | Facility: CLINIC | Age: 67
End: 2022-04-18
Payer: MEDICARE

## 2022-04-18 NOTE — TELEPHONE ENCOUNTER
----- Message from Ivanna Read sent at 4/18/2022 11:46 AM CDT -----  Contact: [phone] @ Patient  Good Morning  Patient need talk to office about her dexcom     Please call and advise

## 2022-04-19 ENCOUNTER — CLINICAL SUPPORT (OUTPATIENT)
Dept: REHABILITATION | Facility: HOSPITAL | Age: 67
End: 2022-04-19
Payer: MEDICARE

## 2022-04-19 DIAGNOSIS — R29.898 ARM WEAKNESS: Primary | ICD-10-CM

## 2022-04-19 DIAGNOSIS — M25.619 DECREASED RANGE OF MOTION OF SHOULDER, UNSPECIFIED LATERALITY: ICD-10-CM

## 2022-04-19 PROCEDURE — 97110 THERAPEUTIC EXERCISES: CPT

## 2022-04-19 NOTE — PLAN OF CARE
[facility]  Physical Therapy Plan of Care Note    Name: Crow Cabralmillion  [facility] Number: [id number]    Therapy Diagnosis:   Encounter Diagnoses   Name Primary?    Arm weakness Yes    Decreased range of motion of shoulder, unspecified laterality      Physician: Anthony Alvarado    Visit Date: 4/19/2022    Physician Orders: PT Eval and Treat   Medical Diagnosis from Referral:   M75.102,M12.812 (ICD-10-CM) - Rotator cuff tear arthropathy of left shoulder   Z96.612 (ICD-10-CM) - S/P reverse total shoulder arthroplasty, left      Evaluation Date: 2/3/2022  Authorization Period Expiration: 12/31/2022  Plan of Care Expiration: 5/19/2022  Progress Note Due: 5/19/2022  Visit # / Visits authorized: 13/20 (+1 eval)   FOTO: 2/3      Precautions: Standard, Diabetes and Fall  Functional Level Prior to Evaluation:  Unable to use left arm     SUBJECTIVE     Update: Patient reports he feels like the shoulder is getting a little better.     OBJECTIVE     Update: AROM/PROM    Right (degrees) Left (degrees)   Cervical Flexion WFL WFL   Cervical Extension WFL WFL   Cervical Sidebending WFL WFL   Cervical Rotation WFL WFL   Shoulder Flexion (180) 160 90/135 pain   Shoulder Abduction (180) 160 90/135pain   Shoulder Extension (60) 50 30   Shoulder Internal Rotation (80) L2 Left hip/45 @90 pain   Shoulder External Rotation (90) T3 T1/90 @90 pain   Elbow Flexion (135) WFL WFL   Elbow Extension (0) WFL WFL      Strength    Right     Left   Shoulder Flexion 4-/5 4-/5   Shoulder Abduction 4-/5 4-/5   Shoulder Extension  4-/5 4-/5   Shoulder External Rotation  4-/5 3+/5   Shoulder Internal Rotation  4-/5 4-/5   Shoulder Protraction  4-/5 4-/5   Elbow Flexion 4/5 4/5   Elbow Extension  4/5 4/5   *right shoulder pain with strength testing also     FOTO: 38% limitation     ASSESSMENT     Update: Patient was progressing slowly until recently being more inconsistent with therapy. His range of motion and  strength are improving since his initial visit and he reports more functional ability in the arm. He would benefit from continued skilled physical therapy in order to work on remaining deficits.     Previous Short Term Goals Status:   Partially met  Long Term Goal Status: continue per initial plan of care.  Reasons for Recertification of Therapy:   Increase range of motion, increase muscle strength, increase muscle endurance, and increase functional ability.     Goals:  Short Term Goals: 4 weeks   1. Patient will improve shoulder flexion to 135 degrees in order to come out of sling. MET  2. Patient will improve shoulder abduction to 115 degrees in order to start light active range. MET  3. Patient will be independent with HEP in order to further progress and return to maximal function. MET  4. Pain rating at Worst: 5/10 in order to progress towards increased independence with activity. PROGRESSING  5. Patient will improve postural awareness. PROGRESSING     Long Term Goals: 8 weeks   1. Patient will improve shoulder strength to 4-/5 in order to perform ADLs with left arm. PROGRESSING  2. Patient will improve elbow strength to 4/5 in order to hold small items. MET  3. Patient will improve shoulder flexion to 150 degrees in order to reach overhead. PROGRESSING  4. Patient will improve shoulder abduciton to 145 degrees in order to reach into cabinets. PROGRESSING  5. Patient will self report improvement to 40% limitation on the FOTO Shoulder Survey. MET    PLAN     Updated Certification Period: 4/19/2022 to 5/19/2022   Recommended Treatment Plan: 2 times per week for 4 weeks:  Manual Therapy, Moist Heat/ Ice, Neuromuscular Re-ed, Patient Education, Self Care, Therapeutic Activities and Therapeutic Exercise  Other Recommendations: none    Rupert Sanchez, PT, DPT    I CERTIFY THE NEED FOR THESE SERVICES FURNISHED UNDER THIS PLAN OF TREATMENT AND WHILE UNDER MY CARE  Physician's comments:      Physician's Signature:  ___________________________________________________

## 2022-04-19 NOTE — PROGRESS NOTES
[facility]   Physical Therapy Treatment Note    Name: Crow FISCHER [facility] Number: [id number]    Therapy Diagnosis:   Encounter Diagnoses   Name Primary?    Arm weakness Yes    Decreased range of motion of shoulder, unspecified laterality      Physician: Anthony Alvarado    Visit Date: 4/19/2022      Physician Orders: PT Eval and Treat   Medical Diagnosis from Referral:   M75.102,M12.812 (ICD-10-CM) - Rotator cuff tear arthropathy of left shoulder   Z96.612 (ICD-10-CM) - S/P reverse total shoulder arthroplasty, left      Evaluation Date: 2/3/2022  Authorization Period Expiration: 12/31/2022  Plan of Care Expiration: 5/19/2022  Progress Note Due: 5/19/2022  Visit # / Visits authorized: 13/20 (+1 eval)   FOTO: 2/3    PTA Visit #: 0/5     Precautions: Standard, Diabetes and Fall     Surgery: S/P Left Reverse Total Shoulder - 1/10/2022    Time In: 9:00am  Time Out: 9:45am  Total Billable Time: 40 minutes (Billing reflects 1 on 1 treatment time spent with patient)     SUBJECTIVE     Patient reports: He feels alright today.   He was compliant with home exercise program with pain.  Response to previous treatment: good  Functional change: decreased sleep, unable to cook, clean home and hard to shop for groceries    Pain: 7/10     Location: anterior left shoulder joint    OBJECTIVE     Objective Measures updated at progress report unless specified.     Left Shoulder ROM: Flexion-135, Abduction-135, External Rotation- 90, Internal Rotation-45    TREATMENT     Crow received the treatments listed below:     MANUAL THERAPY TECHNIQUES were applied for (0) minutes, including:    Manual Intervention Performed Today    Soft Tissue Mobilization  left supraspinatus , infraspinatus , teres major and minor , subscapularis  and deltoid   Joint Mobilizations     Mobilization with movement  PROM Flexion, Abduction, and Rotation    Subscapular release  Left   Functional Dry Needling         Plan for Next Visit:      All passive range was performed to his tolerance in supine.     THERAPEUTIC EXERCISES to develop strength, endurance, ROM and flexibility for (40) minutes including:    Intervention Performed Today    Scap Retractions x 3 minutes red band   Shoulder Isometrics  20x each internal/external/extension elbow at 90, seated today with therapist movement walkaways   Pulleys Flexion and Abduction  x 3 minutes each    Wand Flexion and Abduction seated x 3 minutes each   Shoulder Flexion seated x 3 minutes   Wall Walks Flexion and Scaption x 10x each   Shoulder Extension with Wand x 15x   Internal Rotation with strap x 10x      Plan for Next Visit: advance toward upright exercises       PATIENT EDUCATION AND HOME EXERCISES     Home Exercises Provided and Patient Education Provided     Education provided: (during session) minutes   Patient educated on biomechanical justification for therapeutic exercise and importance of compliance with HEP in order to improve overall impairments and QOL    Patient was educated on all the above exercise prior/during/after for proper posture, positioning, and execution for safe performance with home exercise program.     Written Home Exercises Provided: yes.  Exercises were reviewed and Crow was able to demonstrate them prior to the end of the session.  Crow demonstrated good  understanding of the education provided. See EMR under Patient Instructions for exercises provided during therapy sessions.    ASSESSMENT     A separate note was done today to update plan of care and assess progress. Internal rotation stretch was added today to work on range more. He was advised to keep working on using the arm at home but be cautious not to over do it and hurt himself.     Crow is progressing well towards his goals.   Pt prognosis is Fair.     Pt will continue to benefit from skilled outpatient physical therapy to address the deficits listed in the problem list box  on initial evaluation, provide pt/family education and to maximize pt's level of independence in the home and community environment.     Pt's spiritual, cultural and educational needs considered and pt agreeable to plan of care and goals.       Anticipated Barriers for therapy: chronic shoulder pain, prior inconsistency with therapy       Goals:  Short Term Goals: 4 weeks   1. Patient will improve shoulder flexion to 135 degrees in order to come out of sling. MET  2. Patient will improve shoulder abduction to 115 degrees in order to start light active range. MET  3. Patient will be independent with HEP in order to further progress and return to maximal function. MET  4. Pain rating at Worst: 5/10 in order to progress towards increased independence with activity. PROGRESSING  5. Patient will improve postural awareness. PROGRESSING     Long Term Goals: 8 weeks   1. Patient will improve shoulder strength to 4-/5 in order to perform ADLs with left arm. PROGRESSING  2. Patient will improve elbow strength to 4/5 in order to hold small items. MET  3. Patient will improve shoulder flexion to 150 degrees in order to reach overhead. PROGRESSING  4. Patient will improve shoulder abduciton to 145 degrees in order to reach into cabinets. PROGRESSING  5. Patient will self report improvement to 40% limitation on the FOTO Shoulder Survey.  MET    PLAN     Updated Certification Period: 4/19/2022 to 5/19/2022     Continue Plan of Care (POC) and progress per patient tolerance. See treatment section for details on planned progressions next session.    Rupert Sanchez, PT, DPT

## 2022-04-20 DIAGNOSIS — J81.0 ACUTE PULMONARY EDEMA: ICD-10-CM

## 2022-04-20 RX ORDER — FUROSEMIDE 40 MG/1
TABLET ORAL
Qty: 270 TABLET | Refills: 3 | OUTPATIENT
Start: 2022-04-20

## 2022-04-20 NOTE — TELEPHONE ENCOUNTER
Medication discontinued during hospital visit on 3/5/2022. Needs to follow up with cardiology to discuss whether restarting is appropriate.

## 2022-04-20 NOTE — TELEPHONE ENCOUNTER
----- Message from Kimmie Sheikh sent at 4/20/2022  4:05 PM CDT -----  Contact: Patient, [phone]  Calling to speak with the nurse regarding his medications. Please call him. Thanks.

## 2022-04-21 ENCOUNTER — CARE AT HOME (OUTPATIENT)
Dept: HOME HEALTH SERVICES | Facility: CLINIC | Age: 67
End: 2022-04-21
Payer: MEDICARE

## 2022-04-21 ENCOUNTER — TELEPHONE (OUTPATIENT)
Dept: INTERNAL MEDICINE | Facility: CLINIC | Age: 67
End: 2022-04-21
Payer: MEDICARE

## 2022-04-21 ENCOUNTER — CLINICAL SUPPORT (OUTPATIENT)
Dept: REHABILITATION | Facility: HOSPITAL | Age: 67
End: 2022-04-21
Payer: MEDICARE

## 2022-04-21 VITALS
HEART RATE: 83 BPM | RESPIRATION RATE: 18 BRPM | OXYGEN SATURATION: 97 % | DIASTOLIC BLOOD PRESSURE: 80 MMHG | TEMPERATURE: 98 F | SYSTOLIC BLOOD PRESSURE: 130 MMHG

## 2022-04-21 DIAGNOSIS — I50.42 CHRONIC COMBINED SYSTOLIC AND DIASTOLIC CONGESTIVE HEART FAILURE: ICD-10-CM

## 2022-04-21 DIAGNOSIS — Z79.4 TYPE 2 DIABETES MELLITUS WITH MILD NONPROLIFERATIVE RETINOPATHY AND MACULAR EDEMA, WITH LONG-TERM CURRENT USE OF INSULIN, UNSPECIFIED LATERALITY: ICD-10-CM

## 2022-04-21 DIAGNOSIS — G89.29 OTHER CHRONIC PAIN: Primary | ICD-10-CM

## 2022-04-21 DIAGNOSIS — Z00.00 ENCOUNTER FOR MEDICAL EXAMINATION TO ESTABLISH CARE: ICD-10-CM

## 2022-04-21 DIAGNOSIS — I25.118 CORONARY ARTERY DISEASE OF NATIVE ARTERY OF NATIVE HEART WITH STABLE ANGINA PECTORIS: Primary | ICD-10-CM

## 2022-04-21 DIAGNOSIS — M25.611 DECREASED RANGE OF MOTION OF RIGHT SHOULDER: ICD-10-CM

## 2022-04-21 DIAGNOSIS — R29.898 ARM WEAKNESS: Primary | ICD-10-CM

## 2022-04-21 DIAGNOSIS — E11.3219 TYPE 2 DIABETES MELLITUS WITH MILD NONPROLIFERATIVE RETINOPATHY AND MACULAR EDEMA, WITH LONG-TERM CURRENT USE OF INSULIN, UNSPECIFIED LATERALITY: ICD-10-CM

## 2022-04-21 PROCEDURE — 99350 PR HOME VISIT,ESTAB PATIENT,LEVEL IV: ICD-10-PCS | Mod: ,,, | Performed by: NURSE PRACTITIONER

## 2022-04-21 PROCEDURE — 97110 THERAPEUTIC EXERCISES: CPT | Mod: CQ

## 2022-04-21 PROCEDURE — 99350 HOME/RES VST EST HIGH MDM 60: CPT | Mod: ,,, | Performed by: NURSE PRACTITIONER

## 2022-04-21 RX ORDER — FUROSEMIDE 40 MG/1
40 TABLET ORAL DAILY
COMMUNITY
End: 2022-04-21 | Stop reason: SDUPTHER

## 2022-04-21 RX ORDER — FUROSEMIDE 40 MG/1
40 TABLET ORAL DAILY
Qty: 30 TABLET | Refills: 3 | Status: SHIPPED | OUTPATIENT
Start: 2022-04-21 | End: 2022-08-17 | Stop reason: SDUPTHER

## 2022-04-21 RX ORDER — APIXABAN 5 MG/1
5 TABLET, FILM COATED ORAL 2 TIMES DAILY
Qty: 180 TABLET | Refills: 1 | Status: SHIPPED | OUTPATIENT
Start: 2022-04-21 | End: 2023-01-11

## 2022-04-21 NOTE — PROGRESS NOTES
[facility]   Physical Therapy Treatment Note    Name: Crow FISCHER [facility] Number: [id number]    Therapy Diagnosis:   Encounter Diagnoses   Name Primary?    Arm weakness Yes    Decreased range of motion of right shoulder      Physician: Anthony Alvarado    Visit Date: 4/21/2022      Physician Orders: PT Eval and Treat   Medical Diagnosis from Referral:   M75.102,M12.812 (ICD-10-CM) - Rotator cuff tear arthropathy of left shoulder   Z96.612 (ICD-10-CM) - S/P reverse total shoulder arthroplasty, left      Evaluation Date: 2/3/2022  Authorization Period Expiration: 12/31/2022  Plan of Care Expiration: 5/19/2022  Progress Note Due: 5/19/2022  Visit # / Visits authorized: 14/20 (+1 eval)   FOTO: 2/3    PTA Visit #: 1/5     Precautions: Standard, Diabetes and Fall     Surgery: S/P Left Reverse Total Shoulder - 1/10/2022    Time In: 10:00am  Time Out: 10:45am  Total Billable Time: 42minutes (Billing reflects 1 on 1 treatment time spent with patient)     SUBJECTIVE     Patient reports: He is hurting today. He states that he does a lot of stuff at home with his arm, doing normal daily activities around the house.   He was compliant with home exercise program with pain.  Response to previous treatment: good  Functional change: decreased sleep, unable to cook, clean home and hard to shop for groceries    Pain: 8/10     Location: anterior left shoulder joint    OBJECTIVE     Objective Measures updated at progress report unless specified.     Left Shoulder ROM: Flexion-135, Abduction-135, External Rotation- 90, Internal Rotation-45    TREATMENT     Crow received the treatments listed below:     MANUAL THERAPY TECHNIQUES were applied for (0) minutes, including:    Manual Intervention Performed Today    Soft Tissue Mobilization  left supraspinatus , infraspinatus , teres major and minor , subscapularis  and deltoid   Joint Mobilizations     Mobilization with movement  PROM Flexion,  Abduction, and Rotation    Subscapular release  Left   Functional Dry Needling        Plan for Next Visit:      All passive range was performed to his tolerance in supine.     THERAPEUTIC EXERCISES to develop strength, endurance, ROM and flexibility for (42) minutes including:    Intervention Performed Today    Scap Retractions x 3 minutes red band   Shoulder Isometrics x Flexion/extension/internal/external ball at wall 2 minutes each   Pulleys Flexion and Abduction  x 3 minutes each    Wand Flexion and Abduction seated x 3 minutes each   Shoulder Flexion seated x 3 minutes   Wall Walks Flexion and Scaption x 2 minutes each   Shoulder Extension with Wand x 2 minutes   Internal Rotation with strap x 2 minutes 5s holds   Corner stretch x 2 minutes 10s holds     Plan for Next Visit: advance toward upright exercises       PATIENT EDUCATION AND HOME EXERCISES     Home Exercises Provided and Patient Education Provided     Education provided: (during session) minutes   Patient educated on biomechanical justification for therapeutic exercise and importance of compliance with HEP in order to improve overall impairments and QOL    Patient was educated on all the above exercise prior/during/after for proper posture, positioning, and execution for safe performance with home exercise program.     Written Home Exercises Provided: continue prior home exercise program  Exercises were reviewed and Crow was able to demonstrate them prior to the end of the session.  Crow demonstrated good  understanding of the education provided. See EMR under Patient Instructions for exercises provided during therapy sessions.    ASSESSMENT     Patient did well with session today with minimal complaints of discomfort. Added ball at wall isometrics and corner stretch today. Improved active range of motion noted in flexion and abduction. Patient left session with reports of increased soreness.     Crow is progressing well towards his goals.    Pt prognosis is Fair.     Pt will continue to benefit from skilled outpatient physical therapy to address the deficits listed in the problem list box on initial evaluation, provide pt/family education and to maximize pt's level of independence in the home and community environment.     Pt's spiritual, cultural and educational needs considered and pt agreeable to plan of care and goals.       Anticipated Barriers for therapy: chronic shoulder pain, prior inconsistency with therapy       Goals:  Short Term Goals: 4 weeks   1. Patient will improve shoulder flexion to 135 degrees in order to come out of sling. MET  2. Patient will improve shoulder abduction to 115 degrees in order to start light active range. MET  3. Patient will be independent with HEP in order to further progress and return to maximal function. MET  4. Pain rating at Worst: 5/10 in order to progress towards increased independence with activity. PROGRESSING  5. Patient will improve postural awareness. PROGRESSING     Long Term Goals: 8 weeks   1. Patient will improve shoulder strength to 4-/5 in order to perform ADLs with left arm. PROGRESSING  2. Patient will improve elbow strength to 4/5 in order to hold small items. MET  3. Patient will improve shoulder flexion to 150 degrees in order to reach overhead. PROGRESSING  4. Patient will improve shoulder abduciton to 145 degrees in order to reach into cabinets. PROGRESSING  5. Patient will self report improvement to 40% limitation on the FOTO Shoulder Survey.  MET    PLAN     Updated Certification Period: 4/19/2022 to 5/19/2022     Continue Plan of Care (POC) and progress per patient tolerance. See treatment section for details on planned progressions next session.    Carol Lao, PTA

## 2022-04-21 NOTE — ASSESSMENT & PLAN NOTE
CBG this Am was 136  Patient continues to wear constant monitoring device   Continue ADA Diet  Continue all medications as prescribed   Follow up as scheduled

## 2022-04-21 NOTE — PROGRESS NOTES
[facility]  Medical Home Visit    Visit Date: 4/21/2022  Encounter Provider: Kusum Mahajan  PCP:  Eunice Mora NP    Subjective:      Patient ID: Crow Green is a 66 y.o. male.    Consult Requested By:  Dr. Dakota Francis  Reason for Consult:  Establish care    Crow is being seen at home due to being seen at home due to physical debility that presents a taxing effort to leave the home, to mitigate high risk of hospital readmission and/or due to the limited availability of reliable or safe options for transportation to the point of access to the provider. Prior to treatment on this visit the chart was reviewed and patient verbal consent was obtained.    Chief Complaint: \[facility] [place] [facility]\"" Care      Patient being seen today for a visit to [facility]. Upon arrival patient was AAOx3 in no acute distress. Patient has a c/o pain to the left shoulder. Patient reports that he had a shoulder replacement in February and is currently going to Physical Therapy and has an appointment this morning. He states that he was given tramadol by the surgeon but it is not relieving his pain sufficiently. He also reports that he has pain in his right shoulder that needs surgery. Patient reports that his T2DM is much better. He reports compliance with his diet and medications. His HA1c was 6.1 on 3/3. His CBG was 136 at time of visit. He wears a constant monitoring device. VSS and patient reports compliance with all medications. He reports that he is out of furosemide but has put in a refill request with his regular provider. I will send a referral to pain management since patient has multiple issues of pain that he reports.          Review of Systems   Constitutional: Positive for fatigue. Negative for activity change, appetite change, chills and fever.   HENT: Negative.    Eyes: Negative.    Respiratory: Negative for chest tightness and shortness of breath.    Cardiovascular: Negative for chest pain, palpitations and  leg swelling.   Gastrointestinal: Negative for abdominal pain, constipation, diarrhea, nausea and vomiting.   Endocrine: Negative.    Genitourinary: Negative for difficulty urinating and urgency.   Musculoskeletal: Positive for arthralgias, back pain and myalgias.   Skin: Negative.    Allergic/Immunologic: Negative.    Neurological: Positive for weakness (LUE). Negative for dizziness, syncope, numbness and headaches.   Hematological: Negative.    Psychiatric/Behavioral: Negative for agitation, dysphoric mood, self-injury and sleep disturbance.       Assessments:  · Environmental: Patient lives in a single story home with his father, there are steps at the entrances and lighting is dim and temperature is comfortable  · Functional Status: Patient is independent with ADLs  · Safety: Fall Precaution  · Nutritional: Adequate food in the home  · Home Health/DME/Supplies: [facility], DMEs: none    Objective:   Physical Exam  Vitals reviewed.   Constitutional:       General: He is not in acute distress.     Appearance: He is obese. He is not ill-appearing.   HENT:      Head: Normocephalic and atraumatic.      Nose: Nose normal.      Mouth/Throat:      Mouth: Mucous membranes are moist.      Pharynx: Oropharynx is clear.   Eyes:      Pupils: Pupils are equal, round, and reactive to light.   Cardiovascular:      Rate and Rhythm: Normal rate and regular rhythm.      Pulses: Normal pulses.      Heart sounds: Normal heart sounds.   Pulmonary:      Effort: Pulmonary effort is normal.      Breath sounds: Normal breath sounds.   Abdominal:      Palpations: Abdomen is soft.      Tenderness: There is no abdominal tenderness. There is no guarding.   Musculoskeletal:         General: Tenderness (LUE) present.      Right lower leg: No edema.      Left lower leg: No edema.   Skin:     General: Skin is warm and dry.      Capillary Refill: Capillary refill takes less than 2 seconds.   Neurological:      Mental Status: He is alert  and oriented to person, place, and time.      Motor: Weakness (LUE) present.   Psychiatric:         Mood and Affect: Mood normal.         Behavior: Behavior normal.         Vitals:    04/21/22 0815   BP: 130/80   Pulse: 83   Resp: 18   Temp: 97.6 °F (36.4 °C)   SpO2: 97%   PainSc:   8   PainLoc: Shoulder     There is no height or weight on file to calculate BMI.    Assessment:     1. 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 Encounter for medical examination to establish care    3. Other chronic pain        Plan:     Ethical / Legal: Advance Care Planning   · Surrogate decision maker:  Name Gretchen Green , Relationship: Daughter  · Code Status:  Full  · [place]:  None  · Other advance directive:  None , Capacity to make medical decisions: Yes, Conflict None       Problem List Items Addressed This Visit        Neuro    Other chronic pain    Overview     Patient had left shoulder arthroplasty and has pain to right shoulder that he reports needs to be repaired and also reports that he has pain to the lower back.             Current Assessment & Plan     Will refer to pain management              Endocrine    Type 2 diabetes mellitus - Primary    Overview     Patient reports that he has been compliant with medications and diet, last HA1c was 6.1 on 3/3.            Current Assessment & Plan     CBG this Am was 136  Patient continues to wear constant monitoring device   Continue ADA Diet  Continue all medications as prescribed   Follow up as scheduled              Other Visit Diagnoses     Encounter for medical examination to establish care            Encounter for Medical Follow-Up and Medication Review  - [facility] to schedule follow-up visit with patient  PRN.     Patient Instructions Given:  - Continue all medications, treatments and therapies as ordered.   - Follow all instructions,  recommendations as discussed.  - Maintain Safety Precautions at all times.  - Attend all medical appointments as scheduled.  - For worsening symptoms: call Primary Care Physician or Nurse Practitioner.  - For emergencies, call 911 or immediately report to the nearest emergency room     Were controlled substances prescribed?  No    Follow Up Appointments:   Future Appointments   Date Time Provider Department Center   4/26/2022 10:00 AM Carol Lao, PTA [facility]   4/28/2022 10:00 AM Rupert Sanchez, PT [facility]   5/5/2022  9:20 AM Eduard Zhao MD [facility] CARDIO [facility]   5/16/2022  8:15 AM CHRIS Capps MD [facility] OPHTHAL [facility]   5/16/2022  9:00 AM [facility] LABORATORY [facility] [place]   5/18/2022  2:30 PM Nadia Corcoran MD [facility] DERM [facility]   5/23/2022  1:45 PM PULMONARY LAB, [place] [facility] PULMFUN [facility]   5/23/2022  2:40 PM Elizabeth Lejeune, NP [facility] PULMSVC [facility]   6/9/2022  9:00 AM Barb Veras DPM [facility] POD [facility]       Signature: Kusum Mahajan NP

## 2022-04-21 NOTE — TELEPHONE ENCOUNTER
Pt called requesting refills on (Eliquis) 5mg. Pt states he has noticed he's regaining fluid and does not have any medication. Please contact this pt as NP stated he was taken off on 3/5/22 while in hospital. Pt has a therapy appt @ [facility] today and would like to know if any one in cardiology can see him to discuss as it is important he restarts Lasix to prevent him from having to go to the hospital for fluid over load.    Telephoned patient and he states that he is having SOB and left foot swelling. Patient has an history of asthma and advised patient to take Lasix 40 mg once a day as needed. Advised patient to go to the nearest emergency room if symptoms get worse. Patient verbalizes understanding.

## 2022-04-25 ENCOUNTER — TELEPHONE (OUTPATIENT)
Dept: PAIN MEDICINE | Facility: CLINIC | Age: 67
End: 2022-04-25
Payer: MEDICARE

## 2022-04-25 NOTE — TELEPHONE ENCOUNTER
Called transportation and talked to Baron who stated that the pt does not have a reservation and needs at least 3 days in advance to make an appointment. I got in contact with pt and told him that he needs to call the transportation service to make a reservation with them. I moved his appointment to Friday 04/29/2022 so that he can make transportation reservation. Pt verbalized understanding.

## 2022-04-25 NOTE — TELEPHONE ENCOUNTER
----- Message from Dustin Rowe sent at 4/25/2022  8:57 AM CDT -----  Regarding: Call Back  Who Called: pt         What is the reason for the call: pt calling stating that need someone from office to call transportation and verify appointment ([phone]). Pt asked to please call when it is done to advise him .         Can patient be contacted on [facility]: No          Call back number: [phone]

## 2022-04-26 ENCOUNTER — CLINICAL SUPPORT (OUTPATIENT)
Dept: REHABILITATION | Facility: HOSPITAL | Age: 67
End: 2022-04-26
Payer: MEDICARE

## 2022-04-26 ENCOUNTER — TELEPHONE (OUTPATIENT)
Dept: FAMILY MEDICINE | Facility: CLINIC | Age: 67
End: 2022-04-26
Payer: MEDICARE

## 2022-04-26 DIAGNOSIS — R29.898 ARM WEAKNESS: Primary | ICD-10-CM

## 2022-04-26 DIAGNOSIS — M25.611 DECREASED RANGE OF MOTION OF RIGHT SHOULDER: ICD-10-CM

## 2022-04-26 PROCEDURE — 97110 THERAPEUTIC EXERCISES: CPT | Mod: CQ

## 2022-04-26 NOTE — PROGRESS NOTES
[facility]   Physical Therapy Treatment Note    Name: Crow FISCHER [facility]  Clinic Number: [id number]    Therapy Diagnosis:   Encounter Diagnoses   Name Primary?    Arm weakness Yes    Decreased range of motion of right shoulder      Physician: Anthony Alvarado    Visit Date: 4/26/2022      Physician Orders: PT Eval and Treat   Medical Diagnosis from Referral:   M75.102,M12.812 (ICD-10-CM) - Rotator cuff tear arthropathy of left shoulder   Z96.612 (ICD-10-CM) - S/P reverse total shoulder arthroplasty, left      Evaluation Date: 2/3/2022  Authorization Period Expiration: 12/31/2022  Plan of Care Expiration: 5/19/2022  Progress Note Due: 5/19/2022  Visit # / Visits authorized: 15/20 (+1 eval)   FOTO: 2/3    PTA Visit #: 2/5     Precautions: Standard, Diabetes and Fall     Surgery: S/P Left Reverse Total Shoulder - 1/10/2022    Time In: 9:55am  Time Out: 10:40am  Total Billable Time: 43minutes (Billing reflects 1 on 1 treatment time spent with patient)     SUBJECTIVE     Patient reports: He was very sore after last session, stating he could not do much for a couple of days. He is feeling better today in comparison, but is still having a lot of pain. He sees pain management on Friday.   He was compliant with home exercise program with pain.  Response to previous treatment: good  Functional change: decreased sleep, unable to cook, clean home and hard to shop for groceries    Pain: 7/10     Location: anterior left shoulder joint    OBJECTIVE     Objective Measures updated at progress report unless specified.     Left Shoulder ROM: Flexion-135, Abduction-135, External Rotation- 90, Internal Rotation-45    TREATMENT     Crow received the treatments listed below:     MANUAL THERAPY TECHNIQUES were applied for (0) minutes, including:    Manual Intervention Performed Today    Soft Tissue Mobilization  left supraspinatus , infraspinatus , teres major and minor , subscapularis  and deltoid    Joint Mobilizations     Mobilization with movement  PROM Flexion, Abduction, and Rotation    Subscapular release  Left   Functional Dry Needling        Plan for Next Visit:      All passive range was performed to his tolerance in supine.     THERAPEUTIC EXERCISES to develop strength, endurance, ROM and flexibility for (43) minutes including:    Intervention Performed Today    Ube for range of motion x 5 minutes forward   Scap Retractions  3 minutes red band   Shoulder Isometrics x Flexion/extension/internal/external ball at wall 2 minutes each   Pulleys Flexion and Abduction  x 3 minutes each    Wand Flexion and Abduction seated x 3 minutes each   Shoulder Flexion seated x 3 minutes   Wall Walks Flexion and Scaption x 2 minutes each   Shoulder Extension with Wand x 2 minutes   Internal Rotation with strap  2 minutes 5s holds   Corner stretch x 2 minutes 10s holds     Plan for Next Visit: advance toward upright exercises       PATIENT EDUCATION AND HOME EXERCISES     Home Exercises Provided and Patient Education Provided     Education provided: (during session) minutes   Patient educated on biomechanical justification for therapeutic exercise and importance of compliance with HEP in order to improve overall impairments and QOL    Patient was educated on all the above exercise prior/during/after for proper posture, positioning, and execution for safe performance with home exercise program.     Written Home Exercises Provided: continue prior home exercise program  Exercises were reviewed and Crow was able to demonstrate them prior to the end of the session.  Crow demonstrated good  understanding of the education provided. See EMR under Patient Instructions for exercises provided during therapy sessions.    ASSESSMENT     Patient did well with session today with complaints of discomfort throughout session. Arm bike added today, patient with initial complaints of discomfort which improved with time. Patient  needed rest breaks due to decreased endurance and reports of burning in shoulder with activities. Patient had reports of dizziness at end of session, when his blood sugar alarm when off. Patient reporting fuzzy vision and dizziness, blood sugar reading at 77. PTA had patient sit down and gave him a peppermint. Vision and dizziness subsided after a few minutes, although blood sugar was still around 70, patient leaving session with reports of feeling much better blood sugar wise and soreness in his shoulder.     Crow is progressing well towards his goals.   Pt prognosis is Fair.     Pt will continue to benefit from skilled outpatient physical therapy to address the deficits listed in the problem list box on initial evaluation, provide pt/family education and to maximize pt's level of independence in the home and community environment.     Pt's spiritual, cultural and educational needs considered and pt agreeable to plan of care and goals.       Anticipated Barriers for therapy: chronic shoulder pain, prior inconsistency with therapy       Goals:  Short Term Goals: 4 weeks   1. Patient will improve shoulder flexion to 135 degrees in order to come out of sling. MET  2. Patient will improve shoulder abduction to 115 degrees in order to start light active range. MET  3. Patient will be independent with HEP in order to further progress and return to maximal function. MET  4. Pain rating at Worst: 5/10 in order to progress towards increased independence with activity. PROGRESSING  5. Patient will improve postural awareness. PROGRESSING     Long Term Goals: 8 weeks   1. Patient will improve shoulder strength to 4-/5 in order to perform ADLs with left arm. PROGRESSING  2. Patient will improve elbow strength to 4/5 in order to hold small items. MET  3. Patient will improve shoulder flexion to 150 degrees in order to reach overhead. PROGRESSING  4. Patient will improve shoulder abduciton to 145 degrees in order to reach into  cabinets. PROGRESSING  5. Patient will self report improvement to 40% limitation on the FOTO Shoulder Survey.  MET    PLAN     Updated Certification Period: 4/19/2022 to 5/19/2022     Continue Plan of Care (POC) and progress per patient tolerance. See treatment section for details on planned progressions next session.    Carol Lao, PTA

## 2022-04-26 NOTE — TELEPHONE ENCOUNTER
----- Message from Nina Pena sent at 4/26/2022  1:31 PM CDT -----  Contact: [phone]  Patient is calling to say he need his medicine box filled, please call to advise.

## 2022-04-26 NOTE — TELEPHONE ENCOUNTER
Pt would like for your office to call him he needs help with putting the pills in his med box and he is almost out please assit

## 2022-04-28 ENCOUNTER — CLINICAL SUPPORT (OUTPATIENT)
Dept: REHABILITATION | Facility: HOSPITAL | Age: 67
End: 2022-04-28
Payer: MEDICARE

## 2022-04-28 ENCOUNTER — PATIENT OUTREACH (OUTPATIENT)
Dept: ADMINISTRATIVE | Facility: OTHER | Age: 67
End: 2022-04-28
Payer: MEDICARE

## 2022-04-28 DIAGNOSIS — R29.898 ARM WEAKNESS: Primary | ICD-10-CM

## 2022-04-28 DIAGNOSIS — M25.619 DECREASED RANGE OF MOTION OF SHOULDER, UNSPECIFIED LATERALITY: ICD-10-CM

## 2022-04-28 PROCEDURE — 97110 THERAPEUTIC EXERCISES: CPT

## 2022-04-28 NOTE — PROGRESS NOTES
[facility]   Physical Therapy Treatment Note    Name: Crow FISCHER [facility] Number: [id number]    Therapy Diagnosis:   Encounter Diagnoses   Name Primary?    Arm weakness Yes    Decreased range of motion of shoulder, unspecified laterality      Physician: Anthony Alvarado    Visit Date: 4/28/2022      Physician Orders: PT Eval and Treat   Medical Diagnosis from Referral:   M75.102,M12.812 (ICD-10-CM) - Rotator cuff tear arthropathy of left shoulder   Z96.612 (ICD-10-CM) - S/P reverse total shoulder arthroplasty, left      Evaluation Date: 2/3/2022  Authorization Period Expiration: 12/31/2022  Plan of Care Expiration: 5/19/2022  Progress Note Due: 5/19/2022  Visit # / Visits authorized: 16/20 (+1 eval)   FOTO: 2/3    PTA Visit #: 2/5     Precautions: Standard, Diabetes and Fall     Surgery: S/P Left Reverse Total Shoulder - 1/10/2022    Time In: 10:00am  Time Out: 10:45am  Total Billable Time: 40 minutes (Billing reflects 1 on 1 treatment time spent with patient)     SUBJECTIVE     Patient reports: His shoulder has been bothering him every since last visit.   He was compliant with home exercise program with pain.  Response to previous treatment: good  Functional change: decreased sleep, unable to cook, clean home and hard to shop for groceries    Pain: 7/10     Location: anterior left shoulder joint    OBJECTIVE     Objective Measures updated at progress report unless specified.     Left Shoulder ROM: Flexion-135, Abduction-135, External Rotation- 90, Internal Rotation-45    TREATMENT     Crow received the treatments listed below:     MANUAL THERAPY TECHNIQUES were applied for (0) minutes, including:    Manual Intervention Performed Today    Soft Tissue Mobilization  left supraspinatus , infraspinatus , teres major and minor , subscapularis  and deltoid   Joint Mobilizations     Mobilization with movement  PROM Flexion, Abduction, and Rotation    Subscapular release  Left    Functional Dry Needling        Plan for Next Visit:      All passive range was performed to his tolerance in supine.     THERAPEUTIC EXERCISES to develop strength, endurance, ROM and flexibility for (40) minutes including:    Intervention Performed Today    Ube for range of motion x 5 minutes forward   Scap Retractions  3 minutes red band   Shoulder Isometrics x Flexion/extension/internal/external ball at wall 2 minutes each   Pulleys Flexion and Abduction  x 3 minutes each    Wand Flexion and Abduction seated x 3 minutes each   Shoulder Flexion seated x 3 minutes   Wall Walks Flexion and Scaption x 2 minutes each   Shoulder Extension with Wand x 2 minutes   Internal Rotation with strap  2 minutes 5s holds   Corner stretch x 2 minutes 10s holds     Plan for Next Visit: advance toward upright exercises       PATIENT EDUCATION AND HOME EXERCISES     Home Exercises Provided and Patient Education Provided     Education provided: (during session) minutes   Patient educated on biomechanical justification for therapeutic exercise and importance of compliance with HEP in order to improve overall impairments and QOL    Patient was educated on all the above exercise prior/during/after for proper posture, positioning, and execution for safe performance with home exercise program.     Written Home Exercises Provided: continue prior home exercise program  Exercises were reviewed and Crow was able to demonstrate them prior to the end of the session.  Crow demonstrated good  understanding of the education provided. See EMR under Patient Instructions for exercises provided during therapy sessions.    ASSESSMENT     Pain still remains is one of the biggest limiters to patient range of motion and strength improving. Muscle endurance is also a limiter in what patient is able to do which is preventing him from performing more exercises within the 45 minute treatment time. Frequent rest breaks were needed today due to muscle  fatigue.     Crow is progressing well towards his goals.   Pt prognosis is Fair.     Pt will continue to benefit from skilled outpatient physical therapy to address the deficits listed in the problem list box on initial evaluation, provide pt/family education and to maximize pt's level of independence in the home and community environment.     Pt's spiritual, cultural and educational needs considered and pt agreeable to plan of care and goals.       Anticipated Barriers for therapy: chronic shoulder pain, prior inconsistency with therapy       Goals:  Short Term Goals: 4 weeks   1. Patient will improve shoulder flexion to 135 degrees in order to come out of sling. MET  2. Patient will improve shoulder abduction to 115 degrees in order to start light active range. MET  3. Patient will be independent with HEP in order to further progress and return to maximal function. MET  4. Pain rating at Worst: 5/10 in order to progress towards increased independence with activity. PROGRESSING  5. Patient will improve postural awareness. PROGRESSING     Long Term Goals: 8 weeks   1. Patient will improve shoulder strength to 4-/5 in order to perform ADLs with left arm. PROGRESSING  2. Patient will improve elbow strength to 4/5 in order to hold small items. MET  3. Patient will improve shoulder flexion to 150 degrees in order to reach overhead. PROGRESSING  4. Patient will improve shoulder abduciton to 145 degrees in order to reach into cabinets. PROGRESSING  5. Patient will self report improvement to 40% limitation on the FOTO Shoulder Survey.  MET    PLAN     Updated Certification Period: 4/19/2022 to 5/19/2022     Continue Plan of Care (POC) and progress per patient tolerance. See treatment section for details on planned progressions next session.    Rupert Sanchez, PT, DPT

## 2022-04-28 NOTE — PROGRESS NOTES
Health Maintenance Due   Topic Date Due    Eye Exam  04/06/2022    Foot Exam  06/03/2022     Updates were requested from care everywhere.  Chart was reviewed for overdue Proactive [facility] Encounters (REJI) topics (CRS, Breast Cancer Screening, Eye exam)  Health Maintenance has been updated.  LINKS immunization registry triggered.  Immunizations were reconciled.

## 2022-04-28 NOTE — PROGRESS NOTES
Chief Pain Complaint:  Leg Pain, Shoulder Pain, and Neck Pain (Neck pain, back, shoulder, hands and left leg worse for 3-4 months)    Interval History (4/29/2022):  Crow Green presents today for multiple pain complaints. Reports pain in neck, left shoulder, and lower back. Patient reports pain as 10/10 today. Patient previously referred out to Neurosurgery for back/leg pain,Dr Chandra recommended anterior lumbar interbody fusion at L5-S. Referred to Ortho for left shoulder pain.  S/p left shoulder reverse total arthroplasty on 01/10/2022 w/ Dr. Alvarado. Requesting pain medication.    Interval History:  Patient was last seen on 7-19-18 with Dr. Tsai. At that visit, the plan was to move forward with PITO to help with left leg pain.  The injection had been canceled and rescheduled more than once.  He now states that pain is in both legs, where before it was just the left leg.     History of Present Illness:   Crow Green is a 66 y.o. male  who is presenting with a chief complaint of Low-back Pain. The patient began experiencing this problem insidiously, and the pain has been gradually worsening over the past 6 month(s). The pain is described as throbbing, shooting, burning and electrical and is located in the bilateral lumbar spine. Pain is intermittent and lasts hours. The pain radiates to bilateral lower extremities. The patient rates his pain a 3 out of ten and interferes with activities of daily living a 8 out of ten. Pain is exacerbated by flexion of the lumbar spine, and is improved by rest. Patient reports no prior trauma, no prior spinal surgery     - pertinent negatives: No fever, No chills, No weight loss, No bladder dysfunction, No bowel dysfunction, No saddle anesthesia  - pertinent positives: right leg weakness    - medications, other therapies tried (physical therapy, injections):     >> NSAIDs, Tylenol, Norco, gabapentin and flexeril    >> Has previously undergone Physical Therapy     >> Has NOT previously undergone spinal injection/s     Reviewed, multiple providers      Imaging / Labs / Studies (reviewed on 5/2/2022):    Results for orders placed during the hospital encounter of 03/06/18   MRI Lumbar Spine Without Contrast    Narrative TECHNIQUE:  Multiplanar, multisequence MR images were acquired from the thoracolumbar junction to the sacrum without the administration of contrast.  COMPARISON:  None.  FINDINGS:  Vertebral body heights alignment are within normal limits.  There is moderate disc height loss at L5-S1.  Mild disc height loss also noted and L1-2.  There is mild disc desiccation at L3-4 and L4-5. marrow signal is within normal limits with no evidence of fracture or marrow replacement process.  Conus appears within normal limits terminating at the level of the L1 level.  Cauda equina appears unremarkable  Paraspinous soft tissues appear within normal limits.  T12-L1:   No spinal canal or neuroforaminal narrowing.  L1-2: Small posterior central disc extrusion with superior migration.  No spinal canal or neuroforaminal narrowing.  L2-3:  No spinal canal or neuroforaminal narrowing.  L3-4:  No spinal canal or neuroforaminal narrowing.  L4-5: Mild generalized disc bulge and mild bilateral facet arthropathy.  Moderate left and mild right neuroforaminal narrowing.  No spinal canal stenosis.  L5-S1: Large central/left paracentral disc extrusion with slight inferior migration.  Moderate bilateral facet arthropathy.  Disc material contacts and displaces the descending left S1 nerve root.  Moderate to severe bilateral neural foraminal narrowing.    Impression 1. Posterior central/left paracentral disc extrusion at L5-S1 with mass effect on the descending left S1 nerve root and moderate to severe bilateral neural foraminal narrowing.  2. Other multilevel degenerative disc disease as above.       Results for orders placed during the hospital encounter of 02/10/18   X-Ray Lumbar Spine Ap And  "Lateral    Narrative Lumbar spine, 5 views  Clinical History:   Pain in the low back.  Findings:      The vertebral body heights and alignment are within normal limits. No acute fracture or subluxation.There is bilateral facet joint osteoarthritis at L4-L5 and L5-S1.  There is a very subtle dextroscoliosis of lumbar spine centered at L2-L3.    Impression      No acute fracture or subluxation. Bilateral facet joint osteoarthritis at L4-L5 and L5-S1.         Review of Systems:  CONSTITUTIONAL: patient denies any fever, chills, or weight loss  SKIN: patient denies any rash or itching  RESPIRATORY: patient denies having any shortness of breath  GASTROINTESTINAL: patient denies having any diarrhea, constipation, or bowel incontinence  GENITOURINARY: patient denies having any abnormal bladder function    MUSCULOSKELETAL:  - patient complains of the above noted pain/s (see chief pain complaint)    NEUROLOGICAL:   - pain as above  - strength in Lower extremities is decreased, on the RIGHT  - sensation in Lower extremities is intact, BILATERALLY  - patient denies any loss of bowel or bladder control      PSYCHIATRIC: patient denies any change in mood    Other:  All other systems reviewed and are negative      Physical Exam:  Vitals:  BP (!) 147/89   Pulse 73   Resp 17   Ht 5' 5" (1.651 m)   Wt 87.7 kg (193 lb 5.5 oz)   BMI 32.17 kg/m²   (reviewed on 5/2/2022)    General: alert and oriented, in no apparent distress.  Gait: antalgic gait.  Skin: no rashes, no discoloration, no obvious lesions  HEENT: normocephalic, atraumatic. Pupils equal and round.  Cardiovascular: no significant peripheral edema present.  Respiratory: without use of accessory muscles of respiration.    Musculoskeletal - Lumbar Spine:  - Pain on flexion of lumbar spine: Present  - Pain on extension of lumbar spine: Present  - Lumbar facet loading: Absent   - TTP over the lumbar facet joints: Absent  - TTP over the lumbar paraspinals: Present  - TTP over " the SI joints:  Absent   - TTP over GT bursa: Absent   - Straight Leg Raise: Present  - YONIS: Negative    Left shoulder:  Incision healed, no erythema or induration  No swelling or bruising noted  Limited ROM secondary to pain, limited assessment, but without crepitus  Motor and sensory intact distally      Neuro - Lower Extremities:  - RLE Strength:     >> 5/5 strength with right hip flexion/ extension    >> 5/5 strength with right knee flexion/ extension    >> 5/5 strength in right ankle with plantar and dorsiflexion  - LLE Strength:     >> 5/5 strength with left hip flexion/ extension    >> 5/5 strength with knee flexion extension on the left     >> 5/5 strength in left ankle with plantar and dorsiflexion  - BLE Strength: R/L: HF: 5/5, HE: 5/5, KF: 5/5; KE: 5/5; FE: 5/5; FF: 5/5  - Extremity Reflexes: Brisk and symmetric throughout  - Sensory: Sensation to light touch intact bilaterally      Psych:  Mood and affect is appropriate          Assessment:  Crow Green is a 66 y.o. year old male who is presenting with   Encounter Diagnoses   Name Primary?    Other chronic pain     Rotator cuff tear arthropathy of left shoulder Yes       Plan:  1. Interventional: None. Patient has repeatedly canceled scheduled injections  - S/p Left SI joint with bilateral rhomboid TPI injection with 60% relief.  - S/p  Left L4-5, L5-S1 TFESI on 6/11/2020 with minimal relief     2. Pharmacologic: Continue Gabapentin 600 mg PO TID.     3. Diagnostic: None for now.    4. Consult: Neurosurgery Dr Chandra recommended anterior lumbar interbody fusion at L5-S  External Pain referral for [facility]  Information given for [facility]      5. Follow up: PRN.    Joanie Solorzano PA-C  Interventional Pain Medicine

## 2022-04-29 ENCOUNTER — TELEPHONE (OUTPATIENT)
Dept: INTERNAL MEDICINE | Facility: CLINIC | Age: 67
End: 2022-04-29
Payer: MEDICARE

## 2022-04-29 ENCOUNTER — OFFICE VISIT (OUTPATIENT)
Dept: PAIN MEDICINE | Facility: CLINIC | Age: 67
End: 2022-04-29
Payer: MEDICARE

## 2022-04-29 VITALS
WEIGHT: 193.31 LBS | RESPIRATION RATE: 17 BRPM | HEART RATE: 73 BPM | HEIGHT: 65 IN | DIASTOLIC BLOOD PRESSURE: 89 MMHG | BODY MASS INDEX: 32.21 KG/M2 | SYSTOLIC BLOOD PRESSURE: 147 MMHG

## 2022-04-29 DIAGNOSIS — M12.812 ROTATOR CUFF TEAR ARTHROPATHY OF LEFT SHOULDER: Primary | ICD-10-CM

## 2022-04-29 DIAGNOSIS — G89.29 OTHER CHRONIC PAIN: ICD-10-CM

## 2022-04-29 DIAGNOSIS — M75.102 ROTATOR CUFF TEAR ARTHROPATHY OF LEFT SHOULDER: Primary | ICD-10-CM

## 2022-04-29 PROCEDURE — 3008F PR BODY MASS INDEX (BMI) DOCUMENTED: ICD-10-PCS | Mod: CPTII,S$GLB,, | Performed by: PHYSICIAN ASSISTANT

## 2022-04-29 PROCEDURE — 3044F PR MOST RECENT HEMOGLOBIN A1C LEVEL <7.0%: ICD-10-PCS | Mod: CPTII,S$GLB,, | Performed by: PHYSICIAN ASSISTANT

## 2022-04-29 PROCEDURE — 4010F ACE/ARB THERAPY RXD/TAKEN: CPT | Mod: CPTII,S$GLB,, | Performed by: PHYSICIAN ASSISTANT

## 2022-04-29 PROCEDURE — 3079F DIAST BP 80-89 MM HG: CPT | Mod: CPTII,S$GLB,, | Performed by: PHYSICIAN ASSISTANT

## 2022-04-29 PROCEDURE — 1159F PR MEDICATION LIST DOCUMENTED IN MEDICAL RECORD: ICD-10-PCS | Mod: CPTII,S$GLB,, | Performed by: PHYSICIAN ASSISTANT

## 2022-04-29 PROCEDURE — 4010F PR ACE/ARB THEARPY RXD/TAKEN: ICD-10-PCS | Mod: CPTII,S$GLB,, | Performed by: PHYSICIAN ASSISTANT

## 2022-04-29 PROCEDURE — 3008F BODY MASS INDEX DOCD: CPT | Mod: CPTII,S$GLB,, | Performed by: PHYSICIAN ASSISTANT

## 2022-04-29 PROCEDURE — 99213 PR OFFICE/OUTPT VISIT, EST, LEVL III, 20-29 MIN: ICD-10-PCS | Mod: S$GLB,,, | Performed by: PHYSICIAN ASSISTANT

## 2022-04-29 PROCEDURE — 99999 PR PBB SHADOW E&M-EST. PATIENT-LVL V: CPT | Mod: PBBFAC,,, | Performed by: PHYSICIAN ASSISTANT

## 2022-04-29 PROCEDURE — 3044F HG A1C LEVEL LT 7.0%: CPT | Mod: CPTII,S$GLB,, | Performed by: PHYSICIAN ASSISTANT

## 2022-04-29 PROCEDURE — 1159F MED LIST DOCD IN RCRD: CPT | Mod: CPTII,S$GLB,, | Performed by: PHYSICIAN ASSISTANT

## 2022-04-29 PROCEDURE — 99999 PR PBB SHADOW E&M-EST. PATIENT-LVL V: ICD-10-PCS | Mod: PBBFAC,,, | Performed by: PHYSICIAN ASSISTANT

## 2022-04-29 PROCEDURE — 3077F PR MOST RECENT SYSTOLIC BLOOD PRESSURE >= 140 MM HG: ICD-10-PCS | Mod: CPTII,S$GLB,, | Performed by: PHYSICIAN ASSISTANT

## 2022-04-29 PROCEDURE — 1160F PR REVIEW ALL MEDS BY PRESCRIBER/CLIN PHARMACIST DOCUMENTED: ICD-10-PCS | Mod: CPTII,S$GLB,, | Performed by: PHYSICIAN ASSISTANT

## 2022-04-29 PROCEDURE — 3079F PR MOST RECENT DIASTOLIC BLOOD PRESSURE 80-89 MM HG: ICD-10-PCS | Mod: CPTII,S$GLB,, | Performed by: PHYSICIAN ASSISTANT

## 2022-04-29 PROCEDURE — 3077F SYST BP >= 140 MM HG: CPT | Mod: CPTII,S$GLB,, | Performed by: PHYSICIAN ASSISTANT

## 2022-04-29 PROCEDURE — 99213 OFFICE O/P EST LOW 20 MIN: CPT | Mod: S$GLB,,, | Performed by: PHYSICIAN ASSISTANT

## 2022-04-29 PROCEDURE — 1160F RVW MEDS BY RX/DR IN RCRD: CPT | Mod: CPTII,S$GLB,, | Performed by: PHYSICIAN ASSISTANT

## 2022-04-29 NOTE — PATIENT INSTRUCTIONS
[facility] [phone]  Address: [address] [place] [address] [address], [place] [address]

## 2022-04-29 NOTE — TELEPHONE ENCOUNTER
----- Message from Dayron Rendon sent at 4/29/2022 11:59 AM CDT -----  Contact: [phone]  Type:  RX Refill Request    Who Called:Crow   Refill or New Rx:refill  RX Name and Strength:Hydrocodone 10mg  How is the patient currently taking it? (ex. 1XDay):  Is this a 30 day or 90 day RX:90  Preferred Pharmacy with phone number:  [facility] - [facility] [address]  [address] [facility] [address]  Phone: [phone] Fax: [phone]  Local or Mail Order:mail   Ordering Provider:[facility]   Would the patient rather a call back or a response via [facility]? Call back   Best Call Back Number:[phone]  Additional Information:

## 2022-05-03 ENCOUNTER — OFFICE VISIT (OUTPATIENT)
Dept: INTERNAL MEDICINE | Facility: CLINIC | Age: 67
End: 2022-05-03
Payer: MEDICARE

## 2022-05-03 VITALS
OXYGEN SATURATION: 98 % | HEART RATE: 85 BPM | RESPIRATION RATE: 18 BRPM | TEMPERATURE: 98 F | WEIGHT: 185.63 LBS | DIASTOLIC BLOOD PRESSURE: 76 MMHG | BODY MASS INDEX: 30.89 KG/M2 | SYSTOLIC BLOOD PRESSURE: 138 MMHG

## 2022-05-03 DIAGNOSIS — M54.50 CHRONIC LOW BACK PAIN, UNSPECIFIED BACK PAIN LATERALITY, UNSPECIFIED WHETHER SCIATICA PRESENT: Primary | ICD-10-CM

## 2022-05-03 DIAGNOSIS — Z79.4 TYPE 2 DIABETES MELLITUS WITH MILD NONPROLIFERATIVE RETINOPATHY AND MACULAR EDEMA, WITH LONG-TERM CURRENT USE OF INSULIN, UNSPECIFIED LATERALITY: ICD-10-CM

## 2022-05-03 DIAGNOSIS — G89.29 CHRONIC SHOULDER PAIN, UNSPECIFIED LATERALITY: ICD-10-CM

## 2022-05-03 DIAGNOSIS — E11.3219 TYPE 2 DIABETES MELLITUS WITH MILD NONPROLIFERATIVE RETINOPATHY AND MACULAR EDEMA, WITH LONG-TERM CURRENT USE OF INSULIN, UNSPECIFIED LATERALITY: ICD-10-CM

## 2022-05-03 DIAGNOSIS — G89.29 CHRONIC LOW BACK PAIN, UNSPECIFIED BACK PAIN LATERALITY, UNSPECIFIED WHETHER SCIATICA PRESENT: Primary | ICD-10-CM

## 2022-05-03 DIAGNOSIS — M25.519 CHRONIC SHOULDER PAIN, UNSPECIFIED LATERALITY: ICD-10-CM

## 2022-05-03 PROCEDURE — 3075F PR MOST RECENT SYSTOLIC BLOOD PRESS GE 130-139MM HG: ICD-10-PCS | Mod: CPTII,S$GLB,, | Performed by: FAMILY MEDICINE

## 2022-05-03 PROCEDURE — 3044F PR MOST RECENT HEMOGLOBIN A1C LEVEL <7.0%: ICD-10-PCS | Mod: CPTII,S$GLB,, | Performed by: FAMILY MEDICINE

## 2022-05-03 PROCEDURE — 4010F PR ACE/ARB THEARPY RXD/TAKEN: ICD-10-PCS | Mod: CPTII,S$GLB,, | Performed by: FAMILY MEDICINE

## 2022-05-03 PROCEDURE — 99213 PR OFFICE/OUTPT VISIT, EST, LEVL III, 20-29 MIN: ICD-10-PCS | Mod: S$GLB,,, | Performed by: FAMILY MEDICINE

## 2022-05-03 PROCEDURE — 1159F MED LIST DOCD IN RCRD: CPT | Mod: CPTII,S$GLB,, | Performed by: FAMILY MEDICINE

## 2022-05-03 PROCEDURE — 99999 PR PBB SHADOW E&M-EST. PATIENT-LVL V: ICD-10-PCS | Mod: PBBFAC,,, | Performed by: FAMILY MEDICINE

## 2022-05-03 PROCEDURE — 3044F HG A1C LEVEL LT 7.0%: CPT | Mod: CPTII,S$GLB,, | Performed by: FAMILY MEDICINE

## 2022-05-03 PROCEDURE — 3075F SYST BP GE 130 - 139MM HG: CPT | Mod: CPTII,S$GLB,, | Performed by: FAMILY MEDICINE

## 2022-05-03 PROCEDURE — 3008F PR BODY MASS INDEX (BMI) DOCUMENTED: ICD-10-PCS | Mod: CPTII,S$GLB,, | Performed by: FAMILY MEDICINE

## 2022-05-03 PROCEDURE — 99213 OFFICE O/P EST LOW 20 MIN: CPT | Mod: S$GLB,,, | Performed by: FAMILY MEDICINE

## 2022-05-03 PROCEDURE — 1159F PR MEDICATION LIST DOCUMENTED IN MEDICAL RECORD: ICD-10-PCS | Mod: CPTII,S$GLB,, | Performed by: FAMILY MEDICINE

## 2022-05-03 PROCEDURE — 99999 PR PBB SHADOW E&M-EST. PATIENT-LVL V: CPT | Mod: PBBFAC,,, | Performed by: FAMILY MEDICINE

## 2022-05-03 PROCEDURE — 3078F PR MOST RECENT DIASTOLIC BLOOD PRESSURE < 80 MM HG: ICD-10-PCS | Mod: CPTII,S$GLB,, | Performed by: FAMILY MEDICINE

## 2022-05-03 PROCEDURE — 4010F ACE/ARB THERAPY RXD/TAKEN: CPT | Mod: CPTII,S$GLB,, | Performed by: FAMILY MEDICINE

## 2022-05-03 PROCEDURE — 3078F DIAST BP <80 MM HG: CPT | Mod: CPTII,S$GLB,, | Performed by: FAMILY MEDICINE

## 2022-05-03 PROCEDURE — 3008F BODY MASS INDEX DOCD: CPT | Mod: CPTII,S$GLB,, | Performed by: FAMILY MEDICINE

## 2022-05-03 NOTE — PROGRESS NOTES
Subjective:      Patient ID: Crow Nguyen is a 66 y.o. male.    Chief Complaint: Back Pain and Shoulder Pain    HPIstill having chr low back pain for which injxns recommn and saw dr medley prior note. Also see 4/29 pain clinic note. They referred to ext pain mgmt. Explained mr nguyen the intention of that referral and also still having left should pain, in PT s/p replac. Last January he states disagreement with dr aj on which pain med to use. He says tramadol no help and interested in stronger pain med    Dm due lab soon  Past Medical History:   Diagnosis Date    Arthritis     Asthma     Atrial fibrillation     Atrial fibrillation with RVR 8/7/2019    CHF (congestive heart failure)     Chronic obstructive pulmonary disease 8/5/2020    Depression     Dermatomyositis     Diabetes mellitus, type 2 Diagnosed in 2000    Elevated PSA     Follow up 7/30/2020    Hypertension     Myocardial infarction     2017    Sleep apnea       Past Surgical History:   Procedure Laterality Date    ABLATION OF ARRHYTHMOGENIC FOCUS FOR ATRIAL FIBRILLATION N/A 2/27/2020    Procedure: Ablation atrial fibrillation;  Surgeon: Gio Brown MD;  Location: [facility] EP LAB;  Service: Cardiology;  Laterality: N/A;  afib, DAMIEN (cx if SR), PVI, PITO, anes, MB, 3 Prep    CHOLECYSTECTOMY      CLOSURE OF WOUND Left 7/1/2020    Procedure: CLOSURE, WOUND;  Surgeon: Barb Veras DPM;  Location: [facility] OR;  Service: Podiatry;  Laterality: Left;    COLONOSCOPY N/A 3/4/2022    Procedure: COLONOSCOPY;  Surgeon: Yolis Freitas MD;  Location: [facility] ENDO;  Service: Endoscopy;  Laterality: N/A;    ESOPHAGOGASTRODUODENOSCOPY N/A 3/4/2022    Procedure: EGD (ESOPHAGOGASTRODUODENOSCOPY);  Surgeon: Yolis Freitas MD;  Location: [facility] ENDO;  Service: Endoscopy;  Laterality: N/A;    INJECTION OF ANESTHETIC AGENT INTO SACROILIAC JOINT Left 3/24/2021    Procedure: Left BLOCK, SACROILIAC JOINT and bilateral rhomboid TPI;   Surgeon: Dillan Tsai MD;  Location: [facility] PAIN MGT;  Service: Pain Management;  Laterality: Left;    INTRALUMINAL GASTROINTESTINAL TRACT IMAGING VIA CAPSULE N/A 3/22/2022    Procedure: IMAGING PROCEDURE, GI TRACT, INTRALUMINAL, VIA CAPSULE;  Surgeon: Justice Martinez RN;  Location: [facility] ENDO;  Service: Endoscopy;  Laterality: N/A;    REVERSE TOTAL SHOULDER ARTHROPLASTY Left 1/10/2022    Procedure: ARTHROPLASTY, SHOULDER, TOTAL, REVERSE;  Surgeon: Anthony Alvarado MD;  Location: [facility] OR;  Service: Orthopedics;  Laterality: Left;  left reverse total shoulder arthroplasty     SELECTIVE INJECTION OF ANESTHETIC AGENT AROUND LUMBAR SPINAL NERVE ROOT BY TRANSFORAMINAL APPROACH Left 6/11/2020    Procedure: BLOCK, SPINAL NERVE ROOT, LUMBAR, SELECTIVE, TRANSFORAMINAL APPROACH Left L4-5, L5-S1 TFESI with RN IV sedation;  Surgeon: Dillan Tsai MD;  Location: [facility] PAIN MGT;  Service: Pain Management;  Laterality: Left;    TOE AMPUTATION Left 6/29/2020    Procedure: AMPUTATION, TOE;  Surgeon: Barb Veras DPM;  Location: [facility] OR;  Service: Podiatry;  Laterality: Left;  great toe      Social History     Socioeconomic History    Marital status: Legally     Number of children: 5   Occupational History    Occupation: disabled    Occupation: PT at [employer]    Tobacco Use    Smoking status: Never Smoker    Smokeless tobacco: Never Used   Substance and Sexual Activity    Alcohol use: Yes     Comment: rare, maybe holidays    Drug use: Not Currently     Types: Cocaine    Sexual activity: Not Currently     Partners: Female   Social History Narrative    Utilizing [employer] transportation which has been unreliable.      Social Determinants of Health     Financial Resource Strain: Medium Risk    Difficulty of Paying Living Expenses: Somewhat hard   Food Insecurity: Food Insecurity Present    Worried About Running Out of Food in the Last Year: Often true    Ran Out of Food in the Last Year:  Often true   Transportation Needs: Unmet Transportation Needs    Lack of Transportation (Medical): Yes    Lack of Transportation (Non-Medical): Yes   Physical Activity: Insufficiently Active    Days of Exercise per Week: 1 day    Minutes of Exercise per Session: 60 min   Stress: Stress Concern Present    Feeling of Stress : Very much   Social Connections: Moderately Isolated    Frequency of Communication with Friends and Family: More than three times a week    Frequency of Social Gatherings with Friends and Family: Once a week    Attends Orthodox Services: More than 4 times per year    Active Member of Clubs or Organizations: No    Attends Club or Organization Meetings: Never    Marital Status:    Housing Stability: Low Risk     Unable to Pay for Housing in the Last Year: No    Number of Places Lived in the Last Year: 2    Unstable Housing in the Last Year: No      Family History   Problem Relation Age of Onset    Glaucoma Mother     Cataracts Mother     Diabetes Mother     Cataracts Father     Stroke Father     Glaucoma Sister         x3    Cataracts Sister         x3    Diabetes Sister         x1    Stroke Sister         x1    Glaucoma Maternal Aunt     Diabetes Maternal Aunt     No Known Problems Brother     No Known Problems Daughter     No Known Problems Son     Cancer Maternal Grandfather         lung (smoker)    Prostate cancer Neg Hx     Heart disease Neg Hx     Kidney disease Neg Hx       Review of Systems        Objective:     Physical Examgen nad  cv regular rate  Assessment:         ICD-10-CM ICD-9-CM   1. Chronic low back pain, unspecified back pain laterality, unspecified whether sciatica present  M54.50 724.2    G89.29 338.29   2. Chronic shoulder pain, unspecified laterality  M25.519 719.41    G89.29 338.29    type 2 dm  Plan:      **  Chronic low back pain, unspecified back pain laterality, unspecified whether sciatica present  -     Ambulatory  referral/consult to Pain Clinic; Future; Expected date: 05/03/2022    Chronic shoulder pain, unspecified laterality  -     Ambulatory referral/consult to Pain Clinic; Future; Expected date: 05/03/2022     external pain mgmt referral  He plans to contact [facility] for should issues    F/u mallory after may dr fernando mckeon

## 2022-05-09 DIAGNOSIS — J44.9 CHRONIC OBSTRUCTIVE PULMONARY DISEASE, UNSPECIFIED COPD TYPE: ICD-10-CM

## 2022-05-09 RX ORDER — AZITHROMYCIN 250 MG/1
TABLET, FILM COATED ORAL
Qty: 6 TABLET | Refills: 0 | OUTPATIENT
Start: 2022-05-09

## 2022-05-09 NOTE — TELEPHONE ENCOUNTER
----- Message from Dayron Rendon sent at 5/9/2022  4:04 PM CDT -----  Contact: [phone]  Calling regarding lab .please call back at [phone] . Thanks/dj

## 2022-05-09 NOTE — TELEPHONE ENCOUNTER
----- Message from Rosemary Parish sent at 5/9/2022  2:38 PM CDT -----  Contact: Patient  Please have Jocelyn to give patient a call back at Ph .[phone]

## 2022-05-11 ENCOUNTER — CLINICAL SUPPORT (OUTPATIENT)
Dept: REHABILITATION | Facility: HOSPITAL | Age: 67
End: 2022-05-11
Payer: MEDICARE

## 2022-05-11 DIAGNOSIS — M25.611 DECREASED RANGE OF MOTION OF RIGHT SHOULDER: ICD-10-CM

## 2022-05-11 DIAGNOSIS — R29.898 ARM WEAKNESS: Primary | ICD-10-CM

## 2022-05-11 PROCEDURE — 97110 THERAPEUTIC EXERCISES: CPT | Mod: CQ

## 2022-05-11 NOTE — PROGRESS NOTES
[facility]   Physical Therapy Treatment Note    Name: Crow FISCHER [facility] Number: [id number]    Therapy Diagnosis:   Encounter Diagnoses   Name Primary?    Arm weakness Yes    Decreased range of motion of right shoulder      Physician: Anthony Alvarado    Visit Date: 5/11/2022      Physician Orders: PT Eval and Treat   Medical Diagnosis from Referral:   M75.102,M12.812 (ICD-10-CM) - Rotator cuff tear arthropathy of left shoulder   Z96.612 (ICD-10-CM) - S/P reverse total shoulder arthroplasty, left      Evaluation Date: 2/3/2022  Authorization Period Expiration: 12/31/2022  Plan of Care Expiration: 5/19/2022  Progress Note Due: 5/19/2022  Visit # / Visits authorized: 17/20 (+1 eval)   FOTO: 2/3    PTA Visit #: 3/5     Precautions: Standard, Diabetes and Fall     Surgery: S/P Left Reverse Total Shoulder - 1/10/2022    Time In: 10:00am  Time Out: 10:45am  Total Billable Time: 40 minutes (Billing reflects 1 on 1 treatment time spent with patient)     SUBJECTIVE     Patient reports: He is feeling very sore in the back of his shoulder. He ran a lot of errands yesterday and overdid it with his arm.   He was compliant with home exercise program with pain.  Response to previous treatment: good  Functional change: decreased sleep, unable to cook, clean home and hard to shop for groceries    Pain: 7/10     Location: anterior left shoulder joint    OBJECTIVE     Objective Measures updated at progress report unless specified.     Left Shoulder ROM: Flexion-135, Abduction-135, External Rotation- 90, Internal Rotation-45    TREATMENT     Crow received the treatments listed below:     MANUAL THERAPY TECHNIQUES were applied for (0) minutes, including:    Manual Intervention Performed Today    Soft Tissue Mobilization  left supraspinatus , infraspinatus , teres major and minor , subscapularis  and deltoid   Joint Mobilizations     Mobilization with movement  PROM Flexion, Abduction, and  Rotation    Subscapular release  Left   Functional Dry Needling        Plan for Next Visit:      All passive range was performed to his tolerance in supine.     THERAPEUTIC EXERCISES to develop strength, endurance, ROM and flexibility for (40) minutes including:    Intervention Performed Today    Ube for range of motion x 6 minutes forward   Scap Retractions x 3 minutes red band   Shoulder Isometric walkaways x extension elbow straight + internal/external elbow at 90 2 minutes each Green   Pulleys Flexion and Abduction   3 minutes each    Wand Flexion and Abduction seated x 3 minutes each   Shoulder Flexion seated x 3 minutes   Wall Walks Flexion and Scaption x 3 minutes each   Shoulder Extension with Wand x 3 minutes   Internal Rotation with strap  2 minutes 5s holds   Corner stretch x 2 minutes 10s holds   Seated lat pull downs x 3 minutes 30#     Plan for Next Visit: advance toward upright exercises       PATIENT EDUCATION AND HOME EXERCISES     Home Exercises Provided and Patient Education Provided     Education provided: (during session) minutes   Patient educated on biomechanical justification for therapeutic exercise and importance of compliance with HEP in order to improve overall impairments and QOL    Patient was educated on all the above exercise prior/during/after for proper posture, positioning, and execution for safe performance with home exercise program.     Written Home Exercises Provided: continue prior home exercise program  Exercises were reviewed and Crow was able to demonstrate them prior to the end of the session.  Crow demonstrated good  understanding of the education provided. See EMR under Patient Instructions for exercises provided during therapy sessions.    ASSESSMENT     Patient did well with session today with minimal complaints of discomfort. Improved tolerance to strengthening activities today. Added shoulder walkaways and lat pulldowns today, patient with good fatigue noted.  Most discomfort complaints reported to back pain over shoulder pain. Patient left session with good fatigue and soreness.     Crow is progressing well towards his goals.   Pt prognosis is Fair.     Pt will continue to benefit from skilled outpatient physical therapy to address the deficits listed in the problem list box on initial evaluation, provide pt/family education and to maximize pt's level of independence in the home and community environment.     Pt's spiritual, cultural and educational needs considered and pt agreeable to plan of care and goals.       Anticipated Barriers for therapy: chronic shoulder pain, prior inconsistency with therapy       Goals:  Short Term Goals: 4 weeks   1. Patient will improve shoulder flexion to 135 degrees in order to come out of sling. MET  2. Patient will improve shoulder abduction to 115 degrees in order to start light active range. MET  3. Patient will be independent with HEP in order to further progress and return to maximal function. MET  4. Pain rating at Worst: 5/10 in order to progress towards increased independence with activity. PROGRESSING  5. Patient will improve postural awareness. PROGRESSING     Long Term Goals: 8 weeks   1. Patient will improve shoulder strength to 4-/5 in order to perform ADLs with left arm. PROGRESSING  2. Patient will improve elbow strength to 4/5 in order to hold small items. MET  3. Patient will improve shoulder flexion to 150 degrees in order to reach overhead. PROGRESSING  4. Patient will improve shoulder abduciton to 145 degrees in order to reach into cabinets. PROGRESSING  5. Patient will self report improvement to 40% limitation on the FOTO Shoulder Survey.  MET    PLAN     Updated Certification Period: 4/19/2022 to 5/19/2022     Continue Plan of Care (POC) and progress per patient tolerance. See treatment section for details on planned progressions next session.    Carol Lao, JANESSA

## 2022-05-12 ENCOUNTER — TELEPHONE (OUTPATIENT)
Dept: PRIMARY CARE CLINIC | Facility: CLINIC | Age: 67
End: 2022-05-12
Payer: MEDICARE

## 2022-05-12 NOTE — PROGRESS NOTES
Ce Nagy CHW contacted Mr. Green to do a follow up wellness check. CHW discussed the I program ending May 27, 2022. Patient verbalized understanding and expressed thanks for the counseling and help provided by our team. Mr. Green stated he has made an appointment at [facility]. Patient informed CHW that he has so many appointments, but he trying to take care of himself.

## 2022-05-13 ENCOUNTER — CLINICAL SUPPORT (OUTPATIENT)
Dept: REHABILITATION | Facility: HOSPITAL | Age: 67
End: 2022-05-13
Payer: MEDICARE

## 2022-05-13 DIAGNOSIS — M25.612 DECREASED RANGE OF MOTION OF LEFT SHOULDER: ICD-10-CM

## 2022-05-13 DIAGNOSIS — R29.898 ARM WEAKNESS: Primary | ICD-10-CM

## 2022-05-13 PROCEDURE — 97110 THERAPEUTIC EXERCISES: CPT

## 2022-05-13 NOTE — PROGRESS NOTES
[facility]   Physical Therapy Treatment Note    Name: Crow FISCHER [facility] Number: [id number]    Therapy Diagnosis:   Encounter Diagnoses   Name Primary?    Arm weakness Yes    Decreased range of motion of left shoulder      Physician: Anthony Alvarado    Visit Date: 5/13/2022      Physician Orders: PT Eval and Treat   Medical Diagnosis from Referral:   M75.102,M12.812 (ICD-10-CM) - Rotator cuff tear arthropathy of left shoulder   Z96.612 (ICD-10-CM) - S/P reverse total shoulder arthroplasty, left      Evaluation Date: 2/3/2022  Authorization Period Expiration: 12/31/2022  Plan of Care Expiration: 5/19/2022  Progress Note Due: 5/19/2022  Visit # / Visits authorized: 18/20 (+1 eval)   FOTO: 2/3    PTA Visit #: 0/5     Precautions: Standard, Diabetes and Fall     Surgery: S/P Left Reverse Total Shoulder - 1/10/2022    Time In: 9:30am  Time Out: 10:30am  Total Billable Time: 40 minutes (Billing reflects 1 on 1 treatment time spent with patient)     SUBJECTIVE     Patient reports: His shoulder was burning last night.   He was compliant with home exercise program with pain.  Response to previous treatment: good  Functional change: decreased sleep, unable to cook, clean home and hard to shop for groceries    Pain: 7/10     Location: anterior left shoulder joint    OBJECTIVE     Objective Measures updated at progress report unless specified.     Left Shoulder ROM: Flexion-135, Abduction-135, External Rotation- 90, Internal Rotation-45    TREATMENT     Crow received the treatments listed below:     MANUAL THERAPY TECHNIQUES were applied for (0) minutes, including:    Manual Intervention Performed Today    Soft Tissue Mobilization  left supraspinatus , infraspinatus , teres major and minor , subscapularis  and deltoid   Joint Mobilizations     Mobilization with movement  PROM Flexion, Abduction, and Rotation    Subscapular release  Left   Functional Dry Needling        Plan for  Next Visit:      All passive range was performed to his tolerance in supine.     THERAPEUTIC EXERCISES to develop strength, endurance, ROM and flexibility for (40) minutes including:    Intervention Performed Today    Ube for range of motion x 6 minutes forward   Scap Retractions x 3 minutes red band   Shoulder Isometric walkaways  extension elbow straight + internal/external elbow at 90 2 minutes each Green   Pulleys Flexion and Abduction   3 minutes each    Wand Flexion and Abduction seated x 3 minutes each   Shoulder Flexion seated x 3 minutes   Wall Walks Flexion and Scaption x 3 minutes each   Shoulder Extension with Wand x 3 minutes   Internal Rotation with strap  2 minutes 5s holds   Corner stretch x 2 minutes 10s holds   Seated lat pull downs  3 minutes 30#     Plan for Next Visit: advance toward upright exercises       PATIENT EDUCATION AND HOME EXERCISES     Home Exercises Provided and Patient Education Provided     Education provided: (during session) minutes   Patient educated on biomechanical justification for therapeutic exercise and importance of compliance with HEP in order to improve overall impairments and QOL    Patient was educated on all the above exercise prior/during/after for proper posture, positioning, and execution for safe performance with home exercise program.     Written Home Exercises Provided: continue prior home exercise program  Exercises were reviewed and Crow was able to demonstrate them prior to the end of the session.  Crow demonstrated good  understanding of the education provided. See EMR under Patient Instructions for exercises provided during therapy sessions.    ASSESSMENT     Patient tolerated treatment well but is progressing slower than expected. He was educated on doing more at home with the shoulder and following up with his physician for routine visits.     Crow is progressing well towards his goals.   Pt prognosis is Fair.     Pt will continue to benefit  from skilled outpatient physical therapy to address the deficits listed in the problem list box on initial evaluation, provide pt/family education and to maximize pt's level of independence in the home and community environment.     Pt's spiritual, cultural and educational needs considered and pt agreeable to plan of care and goals.       Anticipated Barriers for therapy: chronic shoulder pain, prior inconsistency with therapy       Goals:  Short Term Goals: 4 weeks   1. Patient will improve shoulder flexion to 135 degrees in order to come out of sling. MET  2. Patient will improve shoulder abduction to 115 degrees in order to start light active range. MET  3. Patient will be independent with HEP in order to further progress and return to maximal function. MET  4. Pain rating at Worst: 5/10 in order to progress towards increased independence with activity. PROGRESSING  5. Patient will improve postural awareness. PROGRESSING     Long Term Goals: 8 weeks   1. Patient will improve shoulder strength to 4-/5 in order to perform ADLs with left arm. PROGRESSING  2. Patient will improve elbow strength to 4/5 in order to hold small items. MET  3. Patient will improve shoulder flexion to 150 degrees in order to reach overhead. PROGRESSING  4. Patient will improve shoulder abduciton to 145 degrees in order to reach into cabinets. PROGRESSING  5. Patient will self report improvement to 40% limitation on the FOTO Shoulder Survey.  MET    PLAN     Updated Certification Period: 4/19/2022 to 5/19/2022     Continue Plan of Care (POC) and progress per patient tolerance. See treatment section for details on planned progressions next session.    Rupert Sanchez, PT, DPT

## 2022-05-16 ENCOUNTER — TELEPHONE (OUTPATIENT)
Dept: INTERNAL MEDICINE | Facility: CLINIC | Age: 67
End: 2022-05-16
Payer: MEDICARE

## 2022-05-16 ENCOUNTER — TELEPHONE (OUTPATIENT)
Dept: DIABETES | Facility: CLINIC | Age: 67
End: 2022-05-16
Payer: MEDICARE

## 2022-05-16 NOTE — TELEPHONE ENCOUNTER
----- Message from Rosemary Parish sent at 5/16/2022  3:54 PM CDT -----  Regarding: Advice  Contact: Patient  Patient would like  Jocelyn to give him a call concerning a medication question he has at Ph .[phone]

## 2022-05-16 NOTE — TELEPHONE ENCOUNTER
Attempt to contact patient. Returning call to see when he can come for nurse visit for dexcom placement. Left VM

## 2022-05-17 ENCOUNTER — TELEPHONE (OUTPATIENT)
Dept: PRIMARY CARE CLINIC | Facility: CLINIC | Age: 67
End: 2022-05-17
Payer: MEDICARE

## 2022-05-17 NOTE — PROGRESS NOTES
Pt received study-end notification letter. I informed the patient that it was a courtesy notice that the program was ending and the last day to have a visit with Kristin Nogueira [facility] is Friday May 27. I explained that after that he should reach out to his provider for any assistance. He stated that he would like to call Dr. Nogueira and I gave him JW Nagy's phone number [phone] for help scheduling a call.

## 2022-05-17 NOTE — PROGRESS NOTES
Mr. Green contacted W to schedule his final appointment with Awilda Nogueira LCSW. [facility] CHW scheduled the appointment for 5- at 3:00 p.m.  Patient confirmed the appointment.

## 2022-05-18 NOTE — PROGRESS NOTES
Individual Psychotherapy (PhD/LCSW)    The patient location is: [place]  The chief complaint leading to consultation is: Depression, Anxiety, and Chronic Pain    Visit type: audio only    Face to Face time with patient: 0  45 minutes of total time spent on the encounter, which includes face to face time and non-face to face time preparing to see the patient (eg, review of tests), Obtaining and/or reviewing separately obtained history, Documenting clinical information in the electronic or other health record, Independently interpreting results (not separately reported) and communicating results to the patient/family/caregiver, or Care coordination (not separately reported).     Each patient to whom he or she provides medical services by telemedicine is:  (1) informed of the relationship between the physician and patient and the respective role of any other health care provider with respect to management of the patient; and (2) notified that he or she may decline to receive medical services by telemedicine and may withdraw from such care at any time.    4/7/2022    Site:  [facility]         Therapeutic Intervention: Met with patient.  Outpatient - Insight oriented psychotherapy 45 min - CPT code 16055, Outpatient - Behavior modifying psychotherapy 45 min - CPT code 13809 and Outpatient - Supportive psychotherapy 45 min - CPT Code 04688    Chief complaint/reason for encounter: depression, anxiety and sleep     Interval history and content of current session: LCSW and patient had follow-up psychotherapy session this date. Patient sounds good, stating he is doing well this week. Wanted to talk to LCSW and states he continues to recover from shoulder replacement and feels it has been a slow process.  Pain remains present, on average a 6/10. He wants to return to [facility] after he completes recovery. Provided patient with phone number for [employer]. He feels this is a form of socialization for him. Overall,  patient feels he is doing well and remains in the maintained phase of the [facility] program.  Follow-up in three months.     Treatment plan:  · Target symptoms: recurrent depression, anxiety   · Why chosen therapy is appropriate versus another modality: relevant to diagnosis, patient responds to this modality, evidence based practice  · Outcome monitoring methods: self-report, observation, checklist/rating scale  · Therapeutic intervention type: insight oriented psychotherapy, behavior modifying psychotherapy    Risk parameters:  Patient reports no suicidal ideation  Patient reports no homicidal ideation  Patient reports no self-injurious behavior  Patient reports no violent behavior    Verbal deficits: None    Patient's response to intervention:  The patient's response to intervention is accepting.    Treatment Goals:  Mental:  Taught relaxation skills (ie, mindfulness, imagery, grounding, progressive muscle relaxation, etc) to reduce/alleviate symptoms relative to the presenting problem area, Taught pt how to apply relaxation skills to specific situations to reduce/alleviate symptoms relative to presenting problem area and encouraged pt to utilize skills daily    Progress toward goals:  The patient's progress toward goals is good.    Diagnosis:     ICD-10-CM ICD-9-CM   1. MDD (major depressive disorder), recurrent episode, moderate  F33.1 296.32   2. Chronic left shoulder pain  M25.512 719.41    G89.29 338.29   3. DOMINIK (generalized anxiety disorder)  F41.1 300.02     Plan:  individual psychotherapy    Return to clinic: 3 months    Length of Service (minutes): 45

## 2022-05-19 ENCOUNTER — CLINICAL SUPPORT (OUTPATIENT)
Dept: PRIMARY CARE CLINIC | Facility: CLINIC | Age: 67
End: 2022-05-19
Payer: MEDICARE

## 2022-05-19 ENCOUNTER — CARE AT HOME (OUTPATIENT)
Dept: HOME HEALTH SERVICES | Facility: CLINIC | Age: 67
End: 2022-05-19
Payer: MEDICARE

## 2022-05-19 VITALS
SYSTOLIC BLOOD PRESSURE: 130 MMHG | RESPIRATION RATE: 18 BRPM | HEART RATE: 77 BPM | TEMPERATURE: 98 F | DIASTOLIC BLOOD PRESSURE: 70 MMHG | OXYGEN SATURATION: 98 %

## 2022-05-19 DIAGNOSIS — M25.512 CHRONIC LEFT SHOULDER PAIN: ICD-10-CM

## 2022-05-19 DIAGNOSIS — F33.1 MDD (MAJOR DEPRESSIVE DISORDER), RECURRENT EPISODE, MODERATE: Primary | ICD-10-CM

## 2022-05-19 DIAGNOSIS — G89.29 CHRONIC LEFT SHOULDER PAIN: ICD-10-CM

## 2022-05-19 DIAGNOSIS — L02.419 AXILLARY ABSCESS: Primary | ICD-10-CM

## 2022-05-19 DIAGNOSIS — G89.29 OTHER CHRONIC PAIN: ICD-10-CM

## 2022-05-19 DIAGNOSIS — Z09 FOLLOW UP: ICD-10-CM

## 2022-05-19 DIAGNOSIS — F41.1 GAD (GENERALIZED ANXIETY DISORDER): ICD-10-CM

## 2022-05-19 PROCEDURE — 90837 PSYTX W PT 60 MINUTES: CPT | Mod: BHI,FQ,95, | Performed by: SOCIAL WORKER

## 2022-05-19 PROCEDURE — 90837 PR PSYCHOTHERAPY W/PATIENT, 60 MIN: ICD-10-PCS | Mod: BHI,FQ,95, | Performed by: SOCIAL WORKER

## 2022-05-19 PROCEDURE — 99349 HOME/RES VST EST MOD MDM 40: CPT | Mod: S$GLB,ICN,, | Performed by: NURSE PRACTITIONER

## 2022-05-19 PROCEDURE — 99349 PR HOME VISIT,ESTAB PATIENT,LEVEL III: ICD-10-PCS | Mod: S$GLB,ICN,, | Performed by: NURSE PRACTITIONER

## 2022-05-19 RX ORDER — CEPHALEXIN 500 MG/1
500 CAPSULE ORAL EVERY 6 HOURS
Qty: 28 CAPSULE | Refills: 0 | Status: SHIPPED | OUTPATIENT
Start: 2022-05-19 | End: 2022-05-26

## 2022-05-20 NOTE — PROGRESS NOTES
"[facility]  Medical Home Visit    Visit Date: 5/19/2022  Encounter Provider: Kusum Mahajan  PCP:  Eunice Mora NP    Subjective:      Patient ID: Crow Green is a 66 y.o. male.    Consult Requested By:  No ref. provider found  Reason for Consult:  Follow up established care patient    Crow is being seen at home due to being seen at home due to physical debility that presents a taxing effort to leave the home, to mitigate high risk of hospital readmission and/or due to the limited availability of reliable or safe options for transportation to the point of access to the provider. Prior to treatment on this visit the chart was reviewed and patient verbal consent was obtained.    Chief Complaint: Follow-up      Patient is being seen today for a follow up to established care. Upon arrival patient was ambulatory AAOX3 in no acute distress. Patient reports that he has been going to the pain management doctor for his shoulder pain and is also going to outpatient therapy. Patient also reports that he has a "boil" under the left arm. Upon assessment patient has a area about 1 cm in diameter in the left axilla. Was able to express a small amount of purulent material from the area. Will place patient on antibiotic and instructed patient to place warm compresses to the area several times a day. Also instructed patient to monitor for signs of infection. Patient verbalized understanding. Patient also had his dexcom 6 monitor but was unable to get it to work correctly. I set it up for him and his monitor is working correctly. VSS and patient reports compliance to all medications.          Review of Systems   Constitutional: Negative for activity change, appetite change, chills and fever.   HENT: Negative.    Eyes: Negative.    Respiratory: Negative for cough and shortness of breath.    Cardiovascular: Negative for chest pain, palpitations and leg swelling.   Gastrointestinal: Negative for abdominal pain, " constipation, diarrhea, nausea and vomiting.   Endocrine: Negative.    Genitourinary: Negative for difficulty urinating and frequency.   Musculoskeletal: Positive for arthralgias and back pain.   Skin: Positive for wound (abscess to left axilla).   Neurological: Negative for dizziness, weakness, light-headedness and headaches.   Hematological: Negative.    Psychiatric/Behavioral: Negative.        Assessments:  · Environmental: Patient lives in a single story home with his father. There are steps at the entrance and also a wheelchair ramp. Lighting is dim and temperature is comfortable  · Functional Status:   · Safety: Fall Precautions  · Nutritional: Adequate food in the home  · Home Health/DME/Supplies: No home health     Objective:   Physical Exam  Vitals reviewed.   Constitutional:       General: He is not in acute distress.  Cardiovascular:      Rate and Rhythm: Normal rate and regular rhythm.      Pulses: Normal pulses.      Heart sounds: Normal heart sounds.   Pulmonary:      Effort: Pulmonary effort is normal.      Breath sounds: Normal breath sounds.   Abdominal:      General: Bowel sounds are normal.      Palpations: Abdomen is soft.   Musculoskeletal:      Right lower leg: No edema.      Left lower leg: No edema.   Skin:     General: Skin is warm and dry.      Capillary Refill: Capillary refill takes less than 2 seconds.      Findings: Abscess (left axilla) present.          Neurological:      General: No focal deficit present.      Mental Status: He is alert and oriented to person, place, and time.   Psychiatric:         Mood and Affect: Mood normal.         Behavior: Behavior normal.         Vitals:    05/19/22 1230   BP: 130/70   Pulse: 77   Resp: 18   Temp: 97.9 °F (36.6 °C)   SpO2: 98%   PainSc:   3   PainLoc: Shoulder     There is no height or weight on file to calculate BMI.    Assessment:     1. Axillary abscess    2. Follow up        Plan:     Ethical / Legal: Advance Care Planning   · Surrogate  decision maker:  Karly Green, Relationship: Sisiter  · Code Status:  Full Code  · [place]:  None  · Other advance directive:  None, Capacity to make medical decisions:  Yes, Conflict None       Problem List Items Addressed This Visit    None     Visit Diagnoses     Axillary abscess    -  Primary    Apply warm compresses to area several times a day   Monitor for signs of infection   Call for any worsening     Relevant Medications    cephALEXin (KEFLEX) 500 MG capsule    Follow up          Encounter for Medical Follow-Up and Medication Review  - [facility] to schedule follow-up visit with patient in 4 weeks or PRN.     Patient Instructions Given:  - Continue all medications, treatments and therapies as ordered.   - Follow all instructions, recommendations as discussed.  - Maintain Safety Precautions at all times.  - Attend all medical appointments as scheduled.  - For worsening symptoms: call Primary Care Physician or Nurse Practitioner.  - For emergencies, call 911 or immediately report to the nearest emergency room       Were controlled substances prescribed?  No    Follow Up Appointments:   Future Appointments   Date Time Provider Department Center   5/23/2022  1:45 PM PULMONARY LAB, [facility] PULM[facility]   5/23/2022  2:40 PM Elizabeth Lejeune, NP [facility] PULMS[facility]   5/24/2022  9:45 AM Rupert Sanchez, PT [facility] RHB[facility]   5/27/2022  9:30 AM Rupert Sanchez, PT [facility]   6/9/2022  9:00 AM Barb Veras DPM [facility] POD [facility]   6/10/2022  9:10 AM LABORATORY, [facility] LAB [facility]   6/10/2022  9:15 AM CHRIS Capps MD [facility] OPHTHAL [facility]   6/28/2022  9:40 AM Eunice Mora NP [facility] [place]   7/7/2022 12:30 PM Eduardo Castillo PA-C [facility] [place]       Signature: Kusum Mahajan NP

## 2022-05-23 ENCOUNTER — TELEPHONE (OUTPATIENT)
Dept: PULMONOLOGY | Facility: CLINIC | Age: 67
End: 2022-05-23
Payer: MEDICARE

## 2022-05-24 ENCOUNTER — TELEPHONE (OUTPATIENT)
Dept: PULMONOLOGY | Facility: CLINIC | Age: 67
End: 2022-05-24
Payer: MEDICARE

## 2022-05-24 ENCOUNTER — TELEPHONE (OUTPATIENT)
Dept: DIABETES | Facility: CLINIC | Age: 67
End: 2022-05-24
Payer: MEDICARE

## 2022-05-24 ENCOUNTER — CLINICAL SUPPORT (OUTPATIENT)
Dept: REHABILITATION | Facility: HOSPITAL | Age: 67
End: 2022-05-24
Payer: MEDICARE

## 2022-05-24 DIAGNOSIS — M25.619 DECREASED RANGE OF MOTION OF SHOULDER, UNSPECIFIED LATERALITY: ICD-10-CM

## 2022-05-24 DIAGNOSIS — R29.898 ARM WEAKNESS: Primary | ICD-10-CM

## 2022-05-24 PROCEDURE — 97110 THERAPEUTIC EXERCISES: CPT

## 2022-05-24 NOTE — PROGRESS NOTES
[facility]   Physical Therapy Treatment Note    Name: Crow FISCHER [facility] Number: [id number]    Therapy Diagnosis:   Encounter Diagnoses   Name Primary?    Arm weakness Yes    Decreased range of motion of shoulder, unspecified laterality      Physician: Anthony Alvarado    Visit Date: 5/24/2022      Physician Orders: PT Eval and Treat   Medical Diagnosis from Referral:   M75.102,M12.812 (ICD-10-CM) - Rotator cuff tear arthropathy of left shoulder   Z96.612 (ICD-10-CM) - S/P reverse total shoulder arthroplasty, left      Evaluation Date: 2/3/2022  Authorization Period Expiration: 12/31/2022  Plan of Care Expiration: 5/19/2022  Progress Note Due: 5/19/2022  Visit # / Visits authorized: 19/20 (+1 eval)   FOTO: 2/3    PTA Visit #: 0/5     Precautions: Standard, Diabetes and Fall     Surgery: S/P Left Reverse Total Shoulder - 1/10/2022    Time In: 9:45am  Time Out: 10:30am  Total Billable Time: 40 minutes (Billing reflects 1 on 1 treatment time spent with patient)     SUBJECTIVE     Patient reports: He is feeling alright. He states he has been going to a new doctor and he has been getting more medication that is helping. He states he feels like he has been able to use his arm more and easier at home.    He was compliant with home exercise program with pain.  Response to previous treatment: good  Functional change: decreased sleep, unable to cook, clean home and hard to shop for groceries    Pain: 7/10     Location: anterior left shoulder joint    OBJECTIVE     Objective Measures updated at progress report unless specified.     Left Shoulder ROM: Flexion-135, Abduction-135, External Rotation- 90, Internal Rotation-45    TREATMENT     Crow received the treatments listed below:     MANUAL THERAPY TECHNIQUES were applied for (0) minutes, including:    Manual Intervention Performed Today    Soft Tissue Mobilization  left supraspinatus , infraspinatus , teres major and minor ,  subscapularis  and deltoid   Joint Mobilizations     Mobilization with movement  PROM Flexion, Abduction, and Rotation    Subscapular release  Left   Functional Dry Needling        Plan for Next Visit:      All passive range was performed to his tolerance in supine.     THERAPEUTIC EXERCISES to develop strength, endurance, ROM and flexibility for (40) minutes including:    Intervention Performed Today    Ube for range of motion x 6 minutes forward   Scap Retractions x 3 minutes green band   Pulleys Flexion and Abduction   3 minutes each    Wand Flexion and Abduction seated x 3 minutes each   Shoulder Flexion seated x 3 minutes   Wall Walks Flexion and Scaption x 3 minutes each   Shoulder Extension with Wand x 3 minutes   Internal Rotation with strap  2 minutes 5s holds   Corner stretch x 2 minutes 10s holds   Seated lat pull downs  3 minutes 30#   External Rotation  x 3 minutes red band     Plan for Next Visit: advance toward upright exercises       PATIENT EDUCATION AND HOME EXERCISES     Home Exercises Provided and Patient Education Provided     Education provided: (during session) minutes   Patient educated on biomechanical justification for therapeutic exercise and importance of compliance with HEP in order to improve overall impairments and QOL    Patient was educated on all the above exercise prior/during/after for proper posture, positioning, and execution for safe performance with home exercise program.     Written Home Exercises Provided: continue prior home exercise program  Exercises were reviewed and Crow was able to demonstrate them prior to the end of the session.  Crow demonstrated good  understanding of the education provided. See EMR under Patient Instructions for exercises provided during therapy sessions.    ASSESSMENT     Patient assisted range of motion is improving along with active. At this time he is limited the most due to muscle endurance. He did tolerated today's treatment well he  just needed increased rest breaks for active range of motion movements. Overall, he tolerated today's treatment well.     Crow is progressing well towards his goals.   Pt prognosis is Fair.     Pt will continue to benefit from skilled outpatient physical therapy to address the deficits listed in the problem list box on initial evaluation, provide pt/family education and to maximize pt's level of independence in the home and community environment.     Pt's spiritual, cultural and educational needs considered and pt agreeable to plan of care and goals.       Anticipated Barriers for therapy: chronic shoulder pain, prior inconsistency with therapy       Goals:  Short Term Goals: 4 weeks   1. Patient will improve shoulder flexion to 135 degrees in order to come out of sling. MET  2. Patient will improve shoulder abduction to 115 degrees in order to start light active range. MET  3. Patient will be independent with HEP in order to further progress and return to maximal function. MET  4. Pain rating at Worst: 5/10 in order to progress towards increased independence with activity. PROGRESSING  5. Patient will improve postural awareness. PROGRESSING     Long Term Goals: 8 weeks   1. Patient will improve shoulder strength to 4-/5 in order to perform ADLs with left arm. PROGRESSING  2. Patient will improve elbow strength to 4/5 in order to hold small items. MET  3. Patient will improve shoulder flexion to 150 degrees in order to reach overhead. PROGRESSING  4. Patient will improve shoulder abduciton to 145 degrees in order to reach into cabinets. PROGRESSING  5. Patient will self report improvement to 40% limitation on the FOTO Shoulder Survey.  MET    PLAN     Updated Certification Period: 4/19/2022 to 5/19/2022     Continue Plan of Care (POC) and progress per patient tolerance. See treatment section for details on planned progressions next session.    Rupert aSnchez, PT, DPT

## 2022-05-24 NOTE — PROGRESS NOTES
Individual Psychotherapy (PhD/LCSW)    The patient location is: [place]  The chief complaint leading to consultation is: Depression, Anxiety, and Chronic Pain    Visit type: audio only    Face to Face time with patient: 0  45 minutes of total time spent on the encounter, which includes face to face time and non-face to face time preparing to see the patient (eg, review of tests), Obtaining and/or reviewing separately obtained history, Documenting clinical information in the electronic or other health record, Independently interpreting results (not separately reported) and communicating results to the patient/family/caregiver, or Care coordination (not separately reported).     Each patient to whom he or she provides medical services by telemedicine is:  (1) informed of the relationship between the physician and patient and the respective role of any other health care provider with respect to management of the patient; and (2) notified that he or she may decline to receive medical services by telemedicine and may withdraw from such care at any time.    5/19/2022    Site:  [facility]         Therapeutic Intervention: Met with patient.  Outpatient - Insight oriented psychotherapy 45 min - CPT code 80951, Outpatient - Behavior modifying psychotherapy 45 min - CPT code 38139 and Outpatient - Supportive psychotherapy 45 min - CPT Code 49679    Chief complaint/reason for encounter: depression, anxiety and sleep     Interval history and content of current session: LCSW and patient had follow-up psychotherapy session this date. Patient sounds good, stating he is doing well this week. Wanted to talk to LCSW and states he continues to recover from shoulder replacement and feels it has been a slow process.  Pain remains present, on average a 5/10, He established care with a new pain management doctor but is unsure of his name, not [facility]. States the first appointment went well and he feels confident with  the plan of care.. He wants to return to [facility] after he completes recovery and physical therapy. Provided patient with phone number for [employer]. He feels this is a form of socialization for him. Overall, patient feels he is doing well and remains in the maintained phase of the I program. He is aware the [facility] program for chronic pain is ending May 31, 2022 and will follow-up with his PCP if any mental health needs arise in the future. He expressed appreciation for the [facility] team and their support.      Treatment plan:  · Target symptoms: recurrent depression, anxiety   · Why chosen therapy is appropriate versus another modality: relevant to diagnosis, patient responds to this modality, evidence based practice  · Outcome monitoring methods: self-report, observation, checklist/rating scale  · Therapeutic intervention type: insight oriented psychotherapy, behavior modifying psychotherapy    Risk parameters:  Patient reports no suicidal ideation  Patient reports no homicidal ideation  Patient reports no self-injurious behavior  Patient reports no violent behavior    Verbal deficits: None    Patient's response to intervention:  The patient's response to intervention is accepting.    Treatment Goals:  Mental:  Taught relaxation skills (ie, mindfulness, imagery, grounding, progressive muscle relaxation, etc) to reduce/alleviate symptoms relative to the presenting problem area, Taught pt how to apply relaxation skills to specific situations to reduce/alleviate symptoms relative to presenting problem area and encouraged pt to utilize skills daily    Progress toward goals:  The patient's progress toward goals is good.    Diagnosis:     ICD-10-CM ICD-9-CM   1. MDD (major depressive disorder), recurrent episode, moderate  F33.1 296.32   2. Chronic left shoulder pain  M25.512 719.41    G89.29 338.29   3. DOMINIK (generalized anxiety disorder)  F41.1 300.02   4. Other chronic pain  G89.29 338.29     Plan:  individual  psychotherapy    Return to clinic: no follow up at this time    Length of Service (minutes): 60

## 2022-05-27 ENCOUNTER — CLINICAL SUPPORT (OUTPATIENT)
Dept: DIABETES | Facility: CLINIC | Age: 67
End: 2022-05-27
Payer: MEDICARE

## 2022-05-27 ENCOUNTER — CLINICAL SUPPORT (OUTPATIENT)
Dept: REHABILITATION | Facility: HOSPITAL | Age: 67
End: 2022-05-27
Payer: MEDICARE

## 2022-05-27 DIAGNOSIS — E10.9 TYPE 1 DIABETES MELLITUS ON INSULIN THERAPY: Primary | ICD-10-CM

## 2022-05-27 DIAGNOSIS — R29.898 ARM WEAKNESS: Primary | ICD-10-CM

## 2022-05-27 DIAGNOSIS — M25.619 DECREASED RANGE OF MOTION OF SHOULDER, UNSPECIFIED LATERALITY: ICD-10-CM

## 2022-05-27 PROCEDURE — 97110 THERAPEUTIC EXERCISES: CPT

## 2022-05-27 NOTE — PLAN OF CARE
[facility]  PT Discharge Note    Name: Crow FISCHER [facility] Number: [id number]    Therapy Diagnosis:   Encounter Diagnoses   Name Primary?    Arm weakness Yes    Decreased range of motion of shoulder, unspecified laterality      Physician: Anthony Alvarado*    Physician Orders: PT Eval and Treat   Medical Diagnosis from Referral:   M75.102,M12.812 (ICD-10-CM) - Rotator cuff tear arthropathy of left shoulder   Z96.612 (ICD-10-CM) - S/P reverse total shoulder arthroplasty, left      Evaluation Date: 2/3/2022    Date of Last visit: 5/26/2022  Total Visits Received: 21    ASSESSMENT      AROM/PROM    Right (degrees) Left (degrees)   Cervical Flexion WFL WFL   Cervical Extension WFL WFL   Cervical Sidebending WFL WFL   Cervical Rotation WFL WFL   Shoulder Flexion (180) 160 140/135 pain   Shoulder Abduction (180) 160 100/135 pain   Shoulder Extension (60) 50 30   Shoulder Internal Rotation (80) L2 L4/60 @90 pain   Shoulder External Rotation (90) T3 T2/90 @90 pain   Elbow Flexion (135) WFL WFL   Elbow Extension (0) WFL WFL      Strength    Right     Left   Shoulder Flexion 4-/5 4-/5   Shoulder Abduction 4-/5 4-/5   Shoulder Extension  4-/5 4-/5   Shoulder External Rotation  4-/5 4-/5   Shoulder Internal Rotation  4-/5 4-/5   Shoulder Protraction  4-/5 4-/5   Elbow Flexion 4/5 4/5   Elbow Extension  4/5 4/5     Discharge reason: Patient has reached the maximum rehab potential for the present time    Discharge FOTO Score: 26% limitation     Goals:  Short Term Goals: 4 weeks   1. Patient will improve shoulder flexion to 135 degrees in order to come out of sling. MET  2. Patient will improve shoulder abduction to 115 degrees in order to start light active range. MET  3. Patient will be independent with HEP in order to further progress and return to maximal function. MET  4. Pain rating at Worst: 5/10 in order to progress towards increased independence with activity. NOT MET  5.  Patient will improve postural awareness. MET     Long Term Goals: 8 weeks   1. Patient will improve shoulder strength to 4-/5 in order to perform ADLs with left arm. MET  2. Patient will improve elbow strength to 4/5 in order to hold small items. MET  3. Patient will improve shoulder flexion to 150 degrees in order to reach overhead. NOT MET  4. Patient will improve shoulder abduciton to 145 degrees in order to reach into cabinets. NOT MET  5. Patient will self report improvement to 40% limitation on the FOTO Shoulder Survey.  MET    PLAN   This patient is discharged from Physical Therapy      Rupert Sanchez, PT, DPT

## 2022-05-27 NOTE — PROGRESS NOTES
"Diabetes Care Specialist Progress Note  Author: Dana Farrell RN  Date: 5/27/2022      Lab Results   Component Value Date    HGBA1C 6.1 (H) 03/03/2022     Patient had Home Health nurse assist with placing a new transmitter, but it was not done correctly and screen was reading "Transmitter Not Found".  Repaired the transmitter and put a new sensor on.   Dexcom paired with transmitter via Bluetooth.  Sensor warm up started successfully.  Pt given a back-up transmitter with directions on how to start a new one, if this one fails as well.   Instructed to call if further help is needed.     Follow Up Plan     Follow up if further help is needed with Dexcom.    Today's care plan and follow up schedule was discussed with patient.  Crow verbalized understanding of the care plan, goals, and agrees to follow up plan.        The patient was encouraged to communicate with his/her health care provider/physician and care team regarding his/her condition(s) and treatment.  I provided the patient with my contact information today and encouraged to contact me via phone or [facility] Portal as needed.     Length of Visit   Total Time: 15 Minutes   "

## 2022-05-27 NOTE — PROGRESS NOTES
[facility]   Physical Therapy Treatment Note    Name: Crow [facility] Number: [id number]    Therapy Diagnosis:   Encounter Diagnoses   Name Primary?    Arm weakness Yes    Decreased range of motion of shoulder, unspecified laterality      Physician: Anthony Alvarado    Visit Date: 5/27/2022      Physician Orders: PT Eval and Treat   Medical Diagnosis from Referral:   M75.102,M12.812 (ICD-10-CM) - Rotator cuff tear arthropathy of left shoulder   Z96.612 (ICD-10-CM) - S/P reverse total shoulder arthroplasty, left      Evaluation Date: 2/3/2022  Authorization Period Expiration: 12/31/2022  Plan of Care Expiration: 5/19/2022  Progress Note Due: 5/19/2022  Visit # / Visits authorized: 20/20 (+1 eval)   FOTO: 3/3    PTA Visit #: 0/5     Precautions: Standard, Diabetes and Fall     Surgery: S/P Left Reverse Total Shoulder - 1/10/2022    Time In: 9:30am  Time Out: 10:15am  Total Billable Time: 40 minutes (Billing reflects 1 on 1 treatment time spent with patient)     SUBJECTIVE     Patient reports: He is feeling alright today.     He was compliant with home exercise program with pain.  Response to previous treatment: good  Functional change: decreased sleep, unable to cook, clean home and hard to shop for groceries    Pain: 7/10     Location: anterior left shoulder joint    OBJECTIVE     Objective Measures updated at progress report unless specified.     Left Shoulder ROM: Flexion-135, Abduction-135, External Rotation- 90, Internal Rotation-45    TREATMENT     Crow received the treatments listed below:     MANUAL THERAPY TECHNIQUES were applied for (0) minutes, including:    Manual Intervention Performed Today    Soft Tissue Mobilization  left supraspinatus , infraspinatus , teres major and minor , subscapularis  and deltoid   Joint Mobilizations     Mobilization with movement  PROM Flexion, Abduction, and Rotation    Subscapular release  Left   Functional Dry Needling         Plan for Next Visit:      All passive range was performed to his tolerance in supine.     THERAPEUTIC EXERCISES to develop strength, endurance, ROM and flexibility for (40) minutes including:    Intervention Performed Today    Ube for range of motion x 6 minutes forward   Scap Retractions x 3 minutes green band   Pulleys Flexion and Abduction  x 3 minutes each    Wand Flexion and Abduction seated  3 minutes each   Shoulder Flexion seated x 3 minutes   Wall Walks Flexion and Scaption x 3 minutes each   Shoulder Extension with Wand x 3 minutes   Internal Rotation with strap x 2 minutes 5s holds   Corner stretch x 2 minutes 10s holds   Seated lat pull downs  3 minutes 30#   External Rotation  x 3 minutes red band   Reassessment of all measures x      Plan for Next Visit:        PATIENT EDUCATION AND HOME EXERCISES     Home Exercises Provided and Patient Education Provided     Education provided: (during session) minutes   Patient educated on biomechanical justification for therapeutic exercise and importance of compliance with HEP in order to improve overall impairments and QOL    Patient was educated on all the above exercise prior/during/after for proper posture, positioning, and execution for safe performance with home exercise program.     Written Home Exercises Provided: continue prior home exercise program  Exercises were reviewed and Crow was able to demonstrate them prior to the end of the session.  Crow demonstrated good  understanding of the education provided. See EMR under Patient Instructions for exercises provided during therapy sessions.    ASSESSMENT     A separate note was done for discharge. He tolerated today's session well. He was advised to keep working on his range and strength at home in order to progress more.     Crow is progressing well towards his goals.   Pt prognosis is Fair.     Pt will continue to benefit from skilled outpatient physical therapy to address the deficits  listed in the problem list box on initial evaluation, provide pt/family education and to maximize pt's level of independence in the home and community environment.     Pt's spiritual, cultural and educational needs considered and pt agreeable to plan of care and goals.       Anticipated Barriers for therapy: chronic shoulder pain, prior inconsistency with therapy       Goals:  Short Term Goals: 4 weeks   1. Patient will improve shoulder flexion to 135 degrees in order to come out of sling. MET  2. Patient will improve shoulder abduction to 115 degrees in order to start light active range. MET  3. Patient will be independent with HEP in order to further progress and return to maximal function. MET  4. Pain rating at Worst: 5/10 in order to progress towards increased independence with activity. NOT MET  5. Patient will improve postural awareness. MET     Long Term Goals: 8 weeks   1. Patient will improve shoulder strength to 4-/5 in order to perform ADLs with left arm. MET  2. Patient will improve elbow strength to 4/5 in order to hold small items. MET  3. Patient will improve shoulder flexion to 150 degrees in order to reach overhead. NOT MET  4. Patient will improve shoulder abduciton to 145 degrees in order to reach into cabinets. NOT MET  5. Patient will self report improvement to 40% limitation on the FOTO Shoulder Survey.  MET    PLAN     Patient is being discharged from physical therapy.     Rupert Sanchez, PT, DPT

## 2022-05-31 ENCOUNTER — TELEPHONE (OUTPATIENT)
Dept: INTERNAL MEDICINE | Facility: CLINIC | Age: 67
End: 2022-05-31
Payer: MEDICARE

## 2022-05-31 DIAGNOSIS — J45.41 MODERATE PERSISTENT ASTHMA WITH ACUTE EXACERBATION: ICD-10-CM

## 2022-05-31 NOTE — TELEPHONE ENCOUNTER
Pt called stating he has completed outpt therapy and was told to contact PCP for home health orders to come out and evaluate/treat  for Home Health.

## 2022-05-31 NOTE — TELEPHONE ENCOUNTER
----- Message from Rosemary Parish sent at 5/31/2022 11:09 AM CDT -----  Regarding: Advice  Contact: Patient  Patient would like Jocelyn to give him a call back concerning home health at Ph .[phone] (Lucien)

## 2022-06-01 DIAGNOSIS — E11.9 TYPE 2 DIABETES MELLITUS WITHOUT COMPLICATION: ICD-10-CM

## 2022-06-01 RX ORDER — TIOTROPIUM BROMIDE 18 UG/1
CAPSULE ORAL; RESPIRATORY (INHALATION)
Qty: 90 CAPSULE | Refills: 3 | Status: SHIPPED | OUTPATIENT
Start: 2022-06-01 | End: 2023-01-25

## 2022-06-01 NOTE — TELEPHONE ENCOUNTER
Pt contacted and informed will need an appt to discuss. Pt states due to transportation he's unable to come to [facility]. Pt would rather be seen by a provider at the [facility]. appt scheduled, pt voiced understanding.

## 2022-06-04 DIAGNOSIS — E78.5 HYPERLIPIDEMIA ASSOCIATED WITH TYPE 2 DIABETES MELLITUS: ICD-10-CM

## 2022-06-04 DIAGNOSIS — E11.69 HYPERLIPIDEMIA ASSOCIATED WITH TYPE 2 DIABETES MELLITUS: ICD-10-CM

## 2022-06-05 RX ORDER — ATORVASTATIN CALCIUM 40 MG/1
TABLET, FILM COATED ORAL
Qty: 90 TABLET | Refills: 3 | Status: SHIPPED | OUTPATIENT
Start: 2022-06-05 | End: 2023-08-04 | Stop reason: SDUPTHER

## 2022-06-05 NOTE — TELEPHONE ENCOUNTER
Refill Routing Note   Medication(s) are not appropriate for processing by [facility] for the following reason(s):      - Non-participating provider    [facility] action(s):  Route          Medication reconciliation completed: No     Appointments  past 12m or future 3m with PCP    Date Provider   Last Visit   4/11/2022 Eunice Mora NP   Next Visit   Visit date not found Eunice Mora NP   ED visits in past 90 days: 0        Note composed:4:34 PM 06/05/2022

## 2022-06-07 ENCOUNTER — TELEPHONE (OUTPATIENT)
Dept: FAMILY MEDICINE | Facility: CLINIC | Age: 67
End: 2022-06-07
Payer: MEDICARE

## 2022-06-07 NOTE — TELEPHONE ENCOUNTER
----- Message from Peggy Arteaga sent at 6/7/2022  4:21 PM CDT -----  Contact: Crow  .Type:  RX Refill Request    Who Called: Crow  Refill or New Rx:refill   RX Name and Strength:furosemide (LASIX) 40 MG tablet  How is the patient currently taking it? (ex. 1XDay):  Is this a 30 day or 90 day RX: 30    Refill: RX: tamsulosin (FLOMAX) 0.4 mg Cap   Taken 1 X daily   30 day supply    Refill: RX pantoprazole (PROTONIX) 40 MG tablet  Dosage: 1 X daily  30 day suppy     Refill: RX: ELIQUIS 5 mg Tab  2 X daily  30 day supply    refill  RX: tamsulosin (FLOMAX) 0.4 mg Cap  30 day supply  1 X daily  Local or Mail Order:local  Ordering Provider:   RX Name and Strength:gabapentin (NEURONTIN) 800 MG tablet  How is the patient currently taking it? (ex. 1XDay): 1 tab 3 X daily  Is this a 30 day or 90 day RX:30    RX Name and Strength:lisinopriL (PRINIVIL,ZESTRIL) 30 MG tablet  How is the patient currently taking it? (ex. 1XDay): 1 X daily  Is this a 30 day or 90 day RX:30      RX Name and Strength:metoprolol succinate (TOPROL-XL) 50 MG 24 hr tablet  How is the patient currently taking it? (ex. 1XDay): 1 X daily  Is this a 30 day or 90 day RX:30      RX Name and Strength:allopurinoL (ZYLOPRIM) 100 MG tablet  How is the patient currently taking it? (ex. 1XDay): 1 X daily   Is this a 30 day or 90 day RX: 30  Preferred Pharmacy with phone number:      [id number] - [place] - [address]  [address] [place] [address]  Phone: [phone] Fax: [phone]       Would the patient rather a call back or a response via [facility]? call  Best Call Back Number: [phone] (home)    Additional Information: pt want all these filled at same pharmacy please

## 2022-06-08 DIAGNOSIS — M51.26 HNP (HERNIATED NUCLEUS PULPOSUS), LUMBAR: ICD-10-CM

## 2022-06-08 DIAGNOSIS — M47.26 OSTEOARTHRITIS OF SPINE WITH RADICULOPATHY, LUMBAR REGION: ICD-10-CM

## 2022-06-08 RX ORDER — GABAPENTIN 800 MG/1
800 TABLET ORAL 3 TIMES DAILY
Qty: 270 TABLET | Refills: 4 | Status: SHIPPED | OUTPATIENT
Start: 2022-06-08 | End: 2023-06-20 | Stop reason: SDUPTHER

## 2022-06-08 NOTE — TELEPHONE ENCOUNTER
----- Message from Nathen Mujica sent at 6/8/2022  8:40 AM CDT -----  Contact: Pt  Pt states that Jocelyn called him and is requesting a call back . Pt states he knows that Dr Lambert is no longer in the [place] area and is rtn Jocelyn's call. Pt can be reached at [phone]//feliz/dbw

## 2022-06-09 ENCOUNTER — OFFICE VISIT (OUTPATIENT)
Dept: PODIATRY | Facility: CLINIC | Age: 67
End: 2022-06-09
Payer: MEDICARE

## 2022-06-09 ENCOUNTER — OFFICE VISIT (OUTPATIENT)
Dept: INTERNAL MEDICINE | Facility: CLINIC | Age: 67
End: 2022-06-09
Payer: MEDICARE

## 2022-06-09 VITALS
WEIGHT: 190.5 LBS | DIASTOLIC BLOOD PRESSURE: 80 MMHG | BODY MASS INDEX: 31.74 KG/M2 | OXYGEN SATURATION: 95 % | TEMPERATURE: 99 F | HEIGHT: 65 IN | HEART RATE: 66 BPM | SYSTOLIC BLOOD PRESSURE: 136 MMHG

## 2022-06-09 VITALS
HEART RATE: 86 BPM | WEIGHT: 185.63 LBS | HEIGHT: 65 IN | DIASTOLIC BLOOD PRESSURE: 71 MMHG | SYSTOLIC BLOOD PRESSURE: 132 MMHG | BODY MASS INDEX: 30.93 KG/M2

## 2022-06-09 DIAGNOSIS — I48.0 PAF (PAROXYSMAL ATRIAL FIBRILLATION): Chronic | ICD-10-CM

## 2022-06-09 DIAGNOSIS — E11.49 TYPE II DIABETES MELLITUS WITH NEUROLOGICAL MANIFESTATIONS: Primary | ICD-10-CM

## 2022-06-09 DIAGNOSIS — L29.9 PRURITUS: ICD-10-CM

## 2022-06-09 DIAGNOSIS — L85.3 XEROSIS CUTIS: ICD-10-CM

## 2022-06-09 DIAGNOSIS — F33.1 MDD (MAJOR DEPRESSIVE DISORDER), RECURRENT EPISODE, MODERATE: ICD-10-CM

## 2022-06-09 DIAGNOSIS — K21.9 GASTROESOPHAGEAL REFLUX DISEASE WITHOUT ESOPHAGITIS: ICD-10-CM

## 2022-06-09 DIAGNOSIS — B35.1 DERMATOPHYTOSIS OF NAIL: ICD-10-CM

## 2022-06-09 DIAGNOSIS — R39.11 BENIGN PROSTATIC HYPERPLASIA WITH URINARY HESITANCY: ICD-10-CM

## 2022-06-09 DIAGNOSIS — M47.26 OSTEOARTHRITIS OF SPINE WITH RADICULOPATHY, LUMBAR REGION: ICD-10-CM

## 2022-06-09 DIAGNOSIS — Z79.4 TYPE 2 DIABETES MELLITUS WITH MILD NONPROLIFERATIVE RETINOPATHY AND MACULAR EDEMA, WITH LONG-TERM CURRENT USE OF INSULIN, UNSPECIFIED LATERALITY: ICD-10-CM

## 2022-06-09 DIAGNOSIS — E11.3219 TYPE 2 DIABETES MELLITUS WITH MILD NONPROLIFERATIVE RETINOPATHY AND MACULAR EDEMA, WITH LONG-TERM CURRENT USE OF INSULIN, UNSPECIFIED LATERALITY: ICD-10-CM

## 2022-06-09 DIAGNOSIS — N40.1 BENIGN PROSTATIC HYPERPLASIA WITH URINARY HESITANCY: ICD-10-CM

## 2022-06-09 DIAGNOSIS — L84 PRE-ULCERATIVE CALLUSES: ICD-10-CM

## 2022-06-09 DIAGNOSIS — M54.16 LUMBAR RADICULOPATHY: ICD-10-CM

## 2022-06-09 DIAGNOSIS — M1A.09X0 CHRONIC GOUT OF MULTIPLE SITES, UNSPECIFIED CAUSE: ICD-10-CM

## 2022-06-09 DIAGNOSIS — I25.118 CORONARY ARTERY DISEASE OF NATIVE ARTERY OF NATIVE HEART WITH STABLE ANGINA PECTORIS: Chronic | ICD-10-CM

## 2022-06-09 DIAGNOSIS — I10 ESSENTIAL HYPERTENSION: ICD-10-CM

## 2022-06-09 DIAGNOSIS — E11.3312 MODERATE NONPROLIFERATIVE DIABETIC RETINOPATHY OF LEFT EYE WITH MACULAR EDEMA ASSOCIATED WITH TYPE 2 DIABETES MELLITUS: ICD-10-CM

## 2022-06-09 DIAGNOSIS — Z91.89 AT RISK FOR MEDICATION NONCOMPLIANCE: Primary | ICD-10-CM

## 2022-06-09 DIAGNOSIS — F41.1 GAD (GENERALIZED ANXIETY DISORDER): ICD-10-CM

## 2022-06-09 DIAGNOSIS — L02.92 FURUNCLE: ICD-10-CM

## 2022-06-09 PROBLEM — J22 LRTI (LOWER RESPIRATORY TRACT INFECTION): Status: RESOLVED | Noted: 2019-11-01 | Resolved: 2022-06-09

## 2022-06-09 PROBLEM — Z01.810 PREOP CARDIOVASCULAR EXAM: Status: RESOLVED | Noted: 2017-08-29 | Resolved: 2022-06-09

## 2022-06-09 PROCEDURE — 3075F SYST BP GE 130 - 139MM HG: CPT | Mod: CPTII,S$GLB,, | Performed by: PODIATRIST

## 2022-06-09 PROCEDURE — 99213 PR OFFICE/OUTPT VISIT, EST, LEVL III, 20-29 MIN: ICD-10-PCS | Mod: S$GLB,,, | Performed by: PHYSICIAN ASSISTANT

## 2022-06-09 PROCEDURE — 11721 PR DEBRIDEMENT OF NAILS, 6 OR MORE: ICD-10-PCS | Mod: 59,Q7,S$GLB, | Performed by: PODIATRIST

## 2022-06-09 PROCEDURE — 11056 PR TRIM BENIGN HYPERKERATOTIC SKIN LESION,2-4: ICD-10-PCS | Mod: Q7,S$GLB,, | Performed by: PODIATRIST

## 2022-06-09 PROCEDURE — 3075F SYST BP GE 130 - 139MM HG: CPT | Mod: CPTII,S$GLB,, | Performed by: PHYSICIAN ASSISTANT

## 2022-06-09 PROCEDURE — 99999 PR PBB SHADOW E&M-EST. PATIENT-LVL V: ICD-10-PCS | Mod: PBBFAC,,, | Performed by: PODIATRIST

## 2022-06-09 PROCEDURE — 4010F PR ACE/ARB THEARPY RXD/TAKEN: ICD-10-PCS | Mod: CPTII,S$GLB,, | Performed by: PHYSICIAN ASSISTANT

## 2022-06-09 PROCEDURE — 3078F PR MOST RECENT DIASTOLIC BLOOD PRESSURE < 80 MM HG: ICD-10-PCS | Mod: CPTII,S$GLB,, | Performed by: PODIATRIST

## 2022-06-09 PROCEDURE — 3044F HG A1C LEVEL LT 7.0%: CPT | Mod: CPTII,S$GLB,, | Performed by: PODIATRIST

## 2022-06-09 PROCEDURE — 1159F MED LIST DOCD IN RCRD: CPT | Mod: CPTII,S$GLB,, | Performed by: PODIATRIST

## 2022-06-09 PROCEDURE — 99999 PR PBB SHADOW E&M-EST. PATIENT-LVL V: CPT | Mod: PBBFAC,,, | Performed by: PODIATRIST

## 2022-06-09 PROCEDURE — 3008F BODY MASS INDEX DOCD: CPT | Mod: CPTII,S$GLB,, | Performed by: PODIATRIST

## 2022-06-09 PROCEDURE — 1160F PR REVIEW ALL MEDS BY PRESCRIBER/CLIN PHARMACIST DOCUMENTED: ICD-10-PCS | Mod: CPTII,S$GLB,, | Performed by: PODIATRIST

## 2022-06-09 PROCEDURE — 1160F RVW MEDS BY RX/DR IN RCRD: CPT | Mod: CPTII,S$GLB,, | Performed by: PODIATRIST

## 2022-06-09 PROCEDURE — 3075F PR MOST RECENT SYSTOLIC BLOOD PRESS GE 130-139MM HG: ICD-10-PCS | Mod: CPTII,S$GLB,, | Performed by: PODIATRIST

## 2022-06-09 PROCEDURE — 4010F ACE/ARB THERAPY RXD/TAKEN: CPT | Mod: CPTII,S$GLB,, | Performed by: PODIATRIST

## 2022-06-09 PROCEDURE — 99999 PR PBB SHADOW E&M-EST. PATIENT-LVL III: ICD-10-PCS | Mod: PBBFAC,,, | Performed by: PHYSICIAN ASSISTANT

## 2022-06-09 PROCEDURE — 3044F PR MOST RECENT HEMOGLOBIN A1C LEVEL <7.0%: ICD-10-PCS | Mod: CPTII,S$GLB,, | Performed by: PHYSICIAN ASSISTANT

## 2022-06-09 PROCEDURE — 3079F PR MOST RECENT DIASTOLIC BLOOD PRESSURE 80-89 MM HG: ICD-10-PCS | Mod: CPTII,S$GLB,, | Performed by: PHYSICIAN ASSISTANT

## 2022-06-09 PROCEDURE — 4010F PR ACE/ARB THEARPY RXD/TAKEN: ICD-10-PCS | Mod: CPTII,S$GLB,, | Performed by: PODIATRIST

## 2022-06-09 PROCEDURE — 3044F HG A1C LEVEL LT 7.0%: CPT | Mod: CPTII,S$GLB,, | Performed by: PHYSICIAN ASSISTANT

## 2022-06-09 PROCEDURE — 3008F PR BODY MASS INDEX (BMI) DOCUMENTED: ICD-10-PCS | Mod: CPTII,S$GLB,, | Performed by: PODIATRIST

## 2022-06-09 PROCEDURE — 4010F ACE/ARB THERAPY RXD/TAKEN: CPT | Mod: CPTII,S$GLB,, | Performed by: PHYSICIAN ASSISTANT

## 2022-06-09 PROCEDURE — 3079F DIAST BP 80-89 MM HG: CPT | Mod: CPTII,S$GLB,, | Performed by: PHYSICIAN ASSISTANT

## 2022-06-09 PROCEDURE — 3008F BODY MASS INDEX DOCD: CPT | Mod: CPTII,S$GLB,, | Performed by: PHYSICIAN ASSISTANT

## 2022-06-09 PROCEDURE — 3008F PR BODY MASS INDEX (BMI) DOCUMENTED: ICD-10-PCS | Mod: CPTII,S$GLB,, | Performed by: PHYSICIAN ASSISTANT

## 2022-06-09 PROCEDURE — 99213 OFFICE O/P EST LOW 20 MIN: CPT | Mod: S$GLB,,, | Performed by: PHYSICIAN ASSISTANT

## 2022-06-09 PROCEDURE — 99499 UNLISTED E&M SERVICE: CPT | Mod: S$GLB,,, | Performed by: PODIATRIST

## 2022-06-09 PROCEDURE — 1159F PR MEDICATION LIST DOCUMENTED IN MEDICAL RECORD: ICD-10-PCS | Mod: CPTII,S$GLB,, | Performed by: PODIATRIST

## 2022-06-09 PROCEDURE — 3044F PR MOST RECENT HEMOGLOBIN A1C LEVEL <7.0%: ICD-10-PCS | Mod: CPTII,S$GLB,, | Performed by: PODIATRIST

## 2022-06-09 PROCEDURE — 3078F DIAST BP <80 MM HG: CPT | Mod: CPTII,S$GLB,, | Performed by: PODIATRIST

## 2022-06-09 PROCEDURE — 99999 PR PBB SHADOW E&M-EST. PATIENT-LVL III: CPT | Mod: PBBFAC,,, | Performed by: PHYSICIAN ASSISTANT

## 2022-06-09 PROCEDURE — 3075F PR MOST RECENT SYSTOLIC BLOOD PRESS GE 130-139MM HG: ICD-10-PCS | Mod: CPTII,S$GLB,, | Performed by: PHYSICIAN ASSISTANT

## 2022-06-09 PROCEDURE — 11056 PARNG/CUTG B9 HYPRKR LES 2-4: CPT | Mod: Q7,S$GLB,, | Performed by: PODIATRIST

## 2022-06-09 PROCEDURE — 99213 OFFICE O/P EST LOW 20 MIN: CPT | Mod: 25,S$GLB,, | Performed by: PODIATRIST

## 2022-06-09 PROCEDURE — 99499 RISK ADDL DX/OHS AUDIT: ICD-10-PCS | Mod: S$GLB,,, | Performed by: PODIATRIST

## 2022-06-09 PROCEDURE — 11721 DEBRIDE NAIL 6 OR MORE: CPT | Mod: 59,Q7,S$GLB, | Performed by: PODIATRIST

## 2022-06-09 PROCEDURE — 99213 PR OFFICE/OUTPT VISIT, EST, LEVL III, 20-29 MIN: ICD-10-PCS | Mod: 25,S$GLB,, | Performed by: PODIATRIST

## 2022-06-09 RX ORDER — TRIAMCINOLONE ACETONIDE 1 MG/G
OINTMENT TOPICAL 2 TIMES DAILY
Qty: 454 G | Refills: 0 | Status: SHIPPED | OUTPATIENT
Start: 2022-06-09 | End: 2022-10-24

## 2022-06-09 RX ORDER — SEMAGLUTIDE 1.34 MG/ML
1 INJECTION, SOLUTION SUBCUTANEOUS
Qty: 6 PEN | Refills: 1 | Status: SHIPPED | OUTPATIENT
Start: 2022-06-09 | End: 2022-09-22

## 2022-06-09 RX ORDER — HYDROCODONE BITARTRATE AND ACETAMINOPHEN 7.5; 325 MG/1; MG/1
TABLET ORAL
COMMUNITY
Start: 2022-06-01 | End: 2022-09-01

## 2022-06-09 RX ORDER — AMLODIPINE BESYLATE 2.5 MG/1
2.5 TABLET ORAL NIGHTLY
Qty: 90 TABLET | Refills: 1 | Status: SHIPPED | OUTPATIENT
Start: 2022-06-09 | End: 2022-08-03

## 2022-06-09 RX ORDER — ALLOPURINOL 100 MG/1
100 TABLET ORAL DAILY
Qty: 90 TABLET | Refills: 1 | Status: SHIPPED | OUTPATIENT
Start: 2022-06-09 | End: 2023-09-29 | Stop reason: SDUPTHER

## 2022-06-09 RX ORDER — DOXYCYCLINE 100 MG/1
100 CAPSULE ORAL 2 TIMES DAILY
Qty: 20 CAPSULE | Refills: 0 | Status: SHIPPED | OUTPATIENT
Start: 2022-06-09 | End: 2022-06-19

## 2022-06-09 RX ORDER — BACLOFEN 10 MG/1
10 TABLET ORAL DAILY
Qty: 90 TABLET | Refills: 0 | Status: SHIPPED | OUTPATIENT
Start: 2022-06-09 | End: 2022-09-01

## 2022-06-09 RX ORDER — SERTRALINE HYDROCHLORIDE 100 MG/1
100 TABLET, FILM COATED ORAL NIGHTLY
Qty: 90 TABLET | Refills: 1 | Status: SHIPPED | OUTPATIENT
Start: 2022-06-09 | End: 2023-01-17

## 2022-06-09 RX ORDER — TAMSULOSIN HYDROCHLORIDE 0.4 MG/1
0.4 CAPSULE ORAL DAILY
Qty: 90 CAPSULE | Refills: 1 | Status: SHIPPED | OUTPATIENT
Start: 2022-06-09 | End: 2024-02-27

## 2022-06-09 RX ORDER — PANTOPRAZOLE SODIUM 40 MG/1
40 TABLET, DELAYED RELEASE ORAL DAILY
Qty: 90 TABLET | Refills: 1 | Status: SHIPPED | OUTPATIENT
Start: 2022-06-09 | End: 2023-07-13 | Stop reason: SDUPTHER

## 2022-06-09 NOTE — PROGRESS NOTES
Subjective:     Patient ID: Crow Green is a 66 y.o. male.    Chief Complaint: Nail Care (Pt c/o BL foot pain 6/10, Diabetic pt wears tennis shoes w/ socks, PCP Dr. Mora last seen 5-3-22) and Foot Pain    Crow is a 66 y.o. male who presents to the clinic for evaluation and treatment of high risk feet. Crow has a past medical history of Arthritis, Asthma, Atrial fibrillation, Atrial fibrillation with RVR (8/7/2019), CHF (congestive heart failure), Chronic obstructive pulmonary disease (8/5/2020), Depression, Dermatomyositis, Diabetes mellitus, type 2 (Diagnosed in 2000), Elevated PSA, Follow up (7/30/2020), Hypertension, Myocardial infarction, and Sleep apnea. The patient's chief complaint is long, thick toenails. This patient has documented high risk feet requiring routine maintenance secondary to diabetes mellitis and those secondary complications of diabetes, as mentioned..    PCP: Eunice Mora NP    Date Last Seen by PCP: 05/03/2022    Current shoe gear:  Affected Foot: Rx diabetic extra depth shoes and custom accommodative insoles     Unaffected Foot: Rx diabetic extra depth shoes and custom accommodative insoles    Hemoglobin A1C   Date Value Ref Range Status   03/03/2022 6.1 (H) 4.0 - 5.6 % Final     Comment:     ADA Screening Guidelines:  5.7-6.4%  Consistent with prediabetes  >or=6.5%  Consistent with diabetes    High levels of fetal hemoglobin interfere with the HbA1C  assay. Heterozygous hemoglobin variants (HbS, HgC, etc)do  not significantly interfere with this assay.   However, presence of multiple variants may affect accuracy.     12/02/2021 6.7 (H) 4.0 - 5.6 % Final     Comment:     ADA Screening Guidelines:  5.7-6.4%  Consistent with prediabetes  >or=6.5%  Consistent with diabetes    High levels of fetal hemoglobin interfere with the HbA1C  assay. Heterozygous hemoglobin variants (HbS, HgC, etc)do  not significantly interfere with this assay.   However, presence of multiple  variants may affect accuracy.     07/08/2021 6.7 (H) 4.0 - 5.6 % Final     Comment:     ADA Screening Guidelines:  5.7-6.4%  Consistent with prediabetes  >or=6.5%  Consistent with diabetes    High levels of fetal hemoglobin interfere with the HbA1C  assay. Heterozygous hemoglobin variants (HbS, HgC, etc)do  not significantly interfere with this assay.   However, presence of multiple variants may affect accuracy.         Patient Active Problem List   Diagnosis    ED (erectile dysfunction)    Non-proliferative diabetic retinopathy, mild, both eyes    Essential hypertension    Diabetic polyneuropathy    Nonobstructive coronary artery disease of native artery of native heart    Chronic combined systolic and diastolic congestive heart failure    Type 2 diabetes mellitus    SANDRITA on CPAP    H/O Asthma    Psychophysiological insomnia    Nightmares    Sleep related gastroesophageal reflux disease    Inadequate sleep hygiene    PAF (paroxysmal atrial fibrillation)    At risk for medication noncompliance    Obesity (BMI 30.0-34.9)    Indeterminate stage chronic open angle glaucoma    Right hip pain    Preop cardiovascular exam    Gait instability    Chronic right shoulder pain    Arthritis    Left sided sciatica    Dyspnea on exertion    Osteoarthritis of spine with radiculopathy, lumbar region    HNP (herniated nucleus pulposus), lumbar    Gastroesophageal reflux disease without esophagitis    Chronic diastolic heart failure    Hypogonadism in male    Disorder of parathyroid gland    Hyperlipidemia associated with type 2 diabetes mellitus    LRTI (lower respiratory tract infection)    Traumatic tear of left rotator cuff    Other chronic pain    Left shoulder pain    Complete tear of left rotator cuff    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 associated with type 2 diabetes mellitus    Lumbar radiculopathy    Diabetic ulcer of toe of left foot  associated with type 2 diabetes mellitus, with necrosis of muscle    Ulcer of left foot    Hypotension due to medication    Dermatomyositis    Postprocedural hypotension    Advanced directives, counseling/discussion    Cocaine abuse with intoxication    Schizoaffective disorder, depressive type    Chronic obstructive pulmonary disease    MDD (major depressive disorder), recurrent episode, moderate    DOMINIK (generalized anxiety disorder)    Bilateral carpal tunnel syndrome    Rotator cuff tear arthropathy of left shoulder    Sacroiliac dysfunction    Atherosclerosis of native coronary artery of native heart with unstable angina pectoris    Coronary vasospasm    Cocaine abuse    Osteopenia    Atherosclerosis of aorta    Cardiac asthma    Pre-operative respiratory examination       Medication List with Changes/Refills   Current Medications    ACETAMINOPHEN (TYLENOL) 500 MG TABLET    Take 2 tablets (1,000 mg total) by mouth every 8 (eight) hours as needed for Pain.    ALBUTEROL (PROVENTIL) 2.5 MG /3 ML (0.083 %) NEBULIZER SOLUTION    Take 3 mLs (2.5 mg total) by nebulization every 4 to 6 hours as needed for Wheezing or Shortness of Breath.    ATORVASTATIN (LIPITOR) 40 MG TABLET    TAKE 1 TABLET BY MOUTH EVERY EVENING    AZATHIOPRINE (IMURAN) 50 MG TAB    Take 1 tablet (50 mg total) by mouth once daily.    BLOOD SUGAR DIAGNOSTIC STRP    1 each by Misc.(Non-Drug; Combo Route) route 4 (four) times daily.    BLOOD-GLUCOSE METER (TRUE METRIX GLUCOSE METER) KIT    Use as instructed to test blood glucose meter daily.    ELIQUIS 5 MG TAB    Take 1 tablet (5 mg total) by mouth 2 (two) times daily.    FLUTICASONE-SALMETEROL DISKUS INHALER 250-50 MCG    Inhale 1 puff into the lungs 2 (two) times daily. Controller    FUROSEMIDE (LASIX) 40 MG TABLET    Take 1 tablet (40 mg total) by mouth once daily.    GABAPENTIN (NEURONTIN) 800 MG TABLET    Take 1 tablet (800 mg total) by mouth 3 (three) times daily.     HYDROCODONE-ACETAMINOPHEN (NORCO) 7.5-325 MG PER TABLET    SMARTSI Tablet(s) By Mouth Every 12 Hours PRN    INSULIN ASPART U-100 (NOVOLOG FLEXPEN U-100 INSULIN) 100 UNIT/ML (3 ML) INPN PEN    INJECT 10 UNITS SUBCUTANEOUSLY THREE TIMES DAILY WITH MEALS    INSULIN DEGLUDEC (TRESIBA FLEXTOUCH U-200) 200 UNIT/ML (3 ML) INSULIN PEN    Inject 40 Units into the skin once daily.    IPRATROPIUM (ATROVENT) 0.02 % NEBULIZER SOLUTION    INHALE THE CONTENTS OF 1 VIAL VIA NEBULIZER FOUR TIMES DAILY    ISOSORBIDE MONONITRATE (IMDUR) 60 MG 24 HR TABLET    Take 1 tablet (60 mg total) by mouth once daily.    JARDIANCE 25 MG TABLET    TAKE 1 TABLET(25 MG) BY MOUTH EVERY DAY    LATANOPROST 0.005 % OPHTHALMIC SOLUTION    INSTILL 1 DROP INTO BOTH EYES ONE TIME DAILY    LISINOPRIL (PRINIVIL,ZESTRIL) 30 MG TABLET    Take 1 tablet by mouth once daily.    METOPROLOL SUCCINATE (TOPROL-XL) 50 MG 24 HR TABLET    Take 1 tablet (50 mg total) by mouth every evening.    MICRO THIN LANCETS 33 GAUGE MISC        RANOLAZINE (RANEXA) 500 MG TB12    Take 1 tablet (500 mg total) by mouth 2 (two) times daily.    TACROLIMUS (PROTOPIC) 0.1 % OINTMENT    Apply topically 2 (two) times daily.    TIOTROPIUM (SPIRIVA WITH HANDIHALER) 18 MCG INHALATION CAPSULE    INHALE THE CONTENTS OF 1 CAPSULE ONE TIME DAILY (CONTROLLER)    TRAMADOL (ULTRAM) 50 MG TABLET    Take 1 tablet (50 mg total) by mouth every 8 (eight) hours as needed for Pain.    TRAZODONE (DESYREL) 100 MG TABLET    Take 2 tablets (200 mg total) by mouth every evening.    VIOS AEROSOL DELIVERY SYSTEM VIN    USE AS DIRESTED   Changed and/or Refilled Medications    Modified Medication Previous Medication    ALLOPURINOL (ZYLOPRIM) 100 MG TABLET allopurinoL (ZYLOPRIM) 100 MG tablet       Take 1 tablet (100 mg total) by mouth once daily.    Take 1 tablet (100 mg total) by mouth once daily.    AMLODIPINE (NORVASC) 2.5 MG TABLET amLODIPine (NORVASC) 2.5 MG tablet       Take 1 tablet (2.5 mg total) by mouth  every evening.    Take 1 tablet (2.5 mg total) by mouth every evening.    BACLOFEN (LIORESAL) 10 MG TABLET baclofen (LIORESAL) 10 MG tablet       Take 1 tablet (10 mg total) by mouth once daily.    TAKE 1 TABLET EVERY DAY    OZEMPIC 1 MG/DOSE (2 MG/1.5 ML) PNIJ OZEMPIC 1 mg/dose (2 mg/1.5 mL) PnIj       Inject 1 mg into the skin every 7 days.    Inject 0.75 mLs (1 mg total) into the skin once a week.    PANTOPRAZOLE (PROTONIX) 40 MG TABLET pantoprazole (PROTONIX) 40 MG tablet       Take 1 tablet (40 mg total) by mouth once daily.    Take 1 tablet (40 mg total) by mouth once daily.    SERTRALINE (ZOLOFT) 100 MG TABLET sertraline (ZOLOFT) 100 MG tablet       Take 1 tablet (100 mg total) by mouth every evening.    Take 1 tablet (100 mg total) by mouth every evening.    TAMSULOSIN (FLOMAX) 0.4 MG CAP tamsulosin (FLOMAX) 0.4 mg Cap       Take 1 capsule (0.4 mg total) by mouth once daily.    Take 1 capsule (0.4 mg total) by mouth once daily.    TRIAMCINOLONE ACETONIDE 0.1% (KENALOG) 0.1 % OINTMENT triamcinolone acetonide 0.1% (KENALOG) 0.1 % ointment       Apply topically 2 (two) times daily. Use on legs and back    Apply topically 2 (two) times daily. Use on legs and back       Review of patient's allergies indicates:   Allergen Reactions    Protamine Hives     Urticaria, possible upper airway swelling       Past Surgical History:   Procedure Laterality Date    ABLATION OF ARRHYTHMOGENIC FOCUS FOR ATRIAL FIBRILLATION N/A 2/27/2020    Procedure: Ablation atrial fibrillation;  Surgeon: Gio Brown MD;  Location: [facility] EP LAB;  Service: Cardiology;  Laterality: N/A;  afib, DAMIEN (cx if SR), PVI, PITO, anes, MB, 3 Prep    CHOLECYSTECTOMY      CLOSURE OF WOUND Left 7/1/2020    Procedure: CLOSURE, WOUND;  Surgeon: Barb Veras DPM;  Location: [facility] OR;  Service: Podiatry;  Laterality: Left;    COLONOSCOPY N/A 3/4/2022    Procedure: COLONOSCOPY;  Surgeon: Yolis Freitas MD;  Location: [facility];  Service:  Endoscopy;  Laterality: N/A;    ESOPHAGOGASTRODUODENOSCOPY N/A 3/4/2022    Procedure: EGD (ESOPHAGOGASTRODUODENOSCOPY);  Surgeon: Yolis Freitas MD;  Location: [facility] ENDO;  Service: Endoscopy;  Laterality: N/A;    INJECTION OF ANESTHETIC AGENT INTO SACROILIAC JOINT Left 3/24/2021    Procedure: Left BLOCK, SACROILIAC JOINT and bilateral rhomboid TPI;  Surgeon: Dillan Tsai MD;  Location: [facility] PAIN T;  Service: Pain Management;  Laterality: Left;    INTRALUMINAL GASTROINTESTINAL TRACT IMAGING VIA CAPSULE N/A 3/22/2022    Procedure: IMAGING PROCEDURE, GI TRACT, INTRALUMINAL, VIA CAPSULE;  Surgeon: Justice Martinez RN;  Location: [facility] ENDO;  Service: Endoscopy;  Laterality: N/A;    REVERSE TOTAL SHOULDER ARTHROPLASTY Left 1/10/2022    Procedure: ARTHROPLASTY, SHOULDER, TOTAL, REVERSE;  Surgeon: Anthony Alvarado MD;  Location: [facility] OR;  Service: Orthopedics;  Laterality: Left;  left reverse total shoulder arthroplasty     SELECTIVE INJECTION OF ANESTHETIC AGENT AROUND LUMBAR SPINAL NERVE ROOT BY TRANSFORAMINAL APPROACH Left 6/11/2020    Procedure: BLOCK, SPINAL NERVE ROOT, LUMBAR, SELECTIVE, TRANSFORAMINAL APPROACH Left L4-5, L5-S1 TFESI with RN IV sedation;  Surgeon: Dillan Tsai MD;  Location: [facility] PAIN T;  Service: Pain Management;  Laterality: Left;    TOE AMPUTATION Left 6/29/2020    Procedure: AMPUTATION, TOE;  Surgeon: Barb Veras DPM;  Location: [facility] OR;  Service: Podiatry;  Laterality: Left;  great toe       Family History   Problem Relation Age of Onset    Glaucoma Mother     Cataracts Mother     Diabetes Mother     Cataracts Father     Stroke Father     Glaucoma Sister         x3    Cataracts Sister         x3    Diabetes Sister         x1    Stroke Sister         x1    Glaucoma Maternal Aunt     Diabetes Maternal Aunt     No Known Problems Brother     No Known Problems Daughter     No Known Problems Son     Cancer Maternal Grandfather         lung (smoker)    Prostate  "cancer Neg Hx     Heart disease Neg Hx     Kidney disease Neg Hx        Social History     Socioeconomic History    Marital status: Legally     Number of children: 5   Occupational History    Occupation: disabled    Occupation: PT at [employer]    Tobacco Use    Smoking status: Never Smoker    Smokeless tobacco: Never Used   Substance and Sexual Activity    Alcohol use: Yes     Comment: rare, maybe holidays    Drug use: Not Currently     Types: Cocaine    Sexual activity: Not Currently     Partners: Female   Social History Narrative    Utilizing [employer] transportation which has been unreliable.      Social Determinants of Health     Financial Resource Strain: Medium Risk    Difficulty of Paying Living Expenses: Somewhat hard   Food Insecurity: Food Insecurity Present    Worried About Running Out of Food in the Last Year: Often true    Ran Out of Food in the Last Year: Often true   Transportation Needs: Unmet Transportation Needs    Lack of Transportation (Medical): Yes    Lack of Transportation (Non-Medical): Yes   Physical Activity: Insufficiently Active    Days of Exercise per Week: 1 day    Minutes of Exercise per Session: 60 min   Stress: Stress Concern Present    Feeling of Stress : Very much   Social Connections: Moderately Isolated    Frequency of Communication with Friends and Family: More than three times a week    Frequency of Social Gatherings with Friends and Family: Once a week    Attends [employer] Services: More than 4 times per year    Active Member of Clubs or Organizations: No    Attends Club or Organization Meetings: Never    Marital Status:    Housing Stability: Low Risk     Unable to Pay for Housing in the Last Year: No    Number of Places Lived in the Last Year: 2    Unstable Housing in the Last Year: No       Vitals:    06/09/22 0931   BP: 132/71   Pulse: 86   Weight: 84.2 kg (185 lb 10 oz)   Height: 5' 5" (1.651 m)   PainSc:   6 "       Hemoglobin A1C   Date Value Ref Range Status   03/03/2022 6.1 (H) 4.0 - 5.6 % Final     Comment:     ADA Screening Guidelines:  5.7-6.4%  Consistent with prediabetes  >or=6.5%  Consistent with diabetes    High levels of fetal hemoglobin interfere with the HbA1C  assay. Heterozygous hemoglobin variants (HbS, HgC, etc)do  not significantly interfere with this assay.   However, presence of multiple variants may affect accuracy.     12/02/2021 6.7 (H) 4.0 - 5.6 % Final     Comment:     ADA Screening Guidelines:  5.7-6.4%  Consistent with prediabetes  >or=6.5%  Consistent with diabetes    High levels of fetal hemoglobin interfere with the HbA1C  assay. Heterozygous hemoglobin variants (HbS, HgC, etc)do  not significantly interfere with this assay.   However, presence of multiple variants may affect accuracy.     07/08/2021 6.7 (H) 4.0 - 5.6 % Final     Comment:     ADA Screening Guidelines:  5.7-6.4%  Consistent with prediabetes  >or=6.5%  Consistent with diabetes    High levels of fetal hemoglobin interfere with the HbA1C  assay. Heterozygous hemoglobin variants (HbS, HgC, etc)do  not significantly interfere with this assay.   However, presence of multiple variants may affect accuracy.         Review of Systems   Constitutional: Negative for chills and fever.   Respiratory: Negative for shortness of breath.    Cardiovascular: Negative for chest pain, palpitations, orthopnea, claudication and leg swelling.   Gastrointestinal: Negative for diarrhea, nausea and vomiting.   Musculoskeletal: Negative for joint pain.   Skin: Negative for rash.   Neurological: Positive for tingling and sensory change.   Psychiatric/Behavioral: Negative.              Objective:      PHYSICAL EXAM: Apperance: Alert and orient in no distress,well developed, and with good attention to grooming and body habits  Patient presents ambulating in slip on shoes and in electric wheelchair  LOWER EXTREMITY EXAM:  VASCULAR: Dorsalis pedis pulses 2/4  bilateral and Posterior Tibial pulses 2/4 bilateral. Capillary fill time <4 seconds bilateral. No edema observed bilateral. Varicosities absent bilateral. Skin temperature of the lower extremities is warm to warm, proximal to distal. Hair growth dim bilateral.  DERMATOLOGICAL: No skin rashes, subcutaneous nodules, lesions, or ulcers observed bilateral. Nails 1,2,3,4,5 right and 2,3,4,5 left elongated, thickened, and discolored with subungual debris. Webspaces 1,2,3,4 bilateral clean, dry and without evidence of break in skin integrity. Mild hyperkeratotic tissue noted to left medial 1st MPJ and distal hallux amputation stump.   NEUROLOGICAL: Light touch, sharp-dull, proprioception all present and equal bilaterally.  Vibratory sensation diminished at bilateral hallux. Protective sensation absent sites as tested with a Meno-Kimmie 5.07 monofilament.   MUSCULOSKELETAL: Muscle strength is 5/5 for foot inverters, everters, plantarflexors, and dorsiflexors. Muscle tone is normal. Left hallux amputation noted.           Assessment:       ICD-10-CM ICD-9-CM   1. Type II diabetes mellitus with neurological manifestations  E11.49 250.60   2. Pre-ulcerative calluses - Left Foot  L84 700   3. Dermatophytosis of nail  B35.1 110.1       Plan:   Type II diabetes mellitus with neurological manifestations    Pre-ulcerative calluses - Left Foot    Dermatophytosis of nail      I counseled the patient on his conditions, regarding findings of my examination, my impressions, and usual treatment plan.   Greater than 50% of this visit spent on counseling and coordination of care.  Greater than 15 minutes of a 20 minute appointment spent on education about the diabetic foot, neuropathy, and prevention of limb loss.  Shoe inspection. Diabetic Foot Education. Patient reminded of the importance of good nutrition and blood sugar control to help prevent podiatric complications of diabetes. Patient instructed on proper foot hygeine. We  discussed wearing proper shoe gear, daily foot inspections, never walking without protective shoe gear, never putting sharp instruments to feet.    With patient's permission, nails 1-5 bilateral were debrided/trimmed in length and thickness aggressively to their soft tissue attachment mechanically and with electric , removing all offending nail and debris. Patient relates relief following the procedure.  With patient's permission, left foot callus trimmed in thickness with #15 blade in thickness without incident.   Patient  will continue to monitor the areas daily, inspect feet, wear protective shoe gear when ambulatory, moisturizer to maintain skin integrity. Patient reminded of the importance of good nutrition and blood sugar control to help prevent podiatric complications of diabetes.  Patient to return 4 months or sooner if needed.                Barb Veras DPM  [facility]

## 2022-06-09 NOTE — PROGRESS NOTES
Subjective:      Patient ID: Crow Green is a 66 y.o. male.    Chief Complaint: Establish Care and Medication Refill    HPI   Here today for refills on his medications.   Also needs help organizing his medications at home. Has compliance problems and often confuses his medications. Requesting home health nurse to come by to help him.   Overdue for follow up with cardiology.   Seen by podiatry today. Eye exam scheduled. See's diabetes team for management of his diabetes.     Patient Active Problem List   Diagnosis    ED (erectile dysfunction)    Non-proliferative diabetic retinopathy, mild, both eyes    Essential hypertension    Diabetic polyneuropathy    Nonobstructive coronary artery disease of native artery of native heart    Chronic combined systolic and diastolic congestive heart failure    Type 2 diabetes mellitus    SANDRITA on CPAP    H/O Asthma    Psychophysiological insomnia    Nightmares    Sleep related gastroesophageal reflux disease    Inadequate sleep hygiene    PAF (paroxysmal atrial fibrillation)    At risk for medication noncompliance    Obesity (BMI 30.0-34.9)    Indeterminate stage chronic open angle glaucoma    Right hip pain    Gait instability    Chronic right shoulder pain    Arthritis    Left sided sciatica    Dyspnea on exertion    Osteoarthritis of spine with radiculopathy, lumbar region    HNP (herniated nucleus pulposus), lumbar    Gastroesophageal reflux disease without esophagitis    Chronic diastolic heart failure    Hypogonadism in male    Disorder of parathyroid gland    Hyperlipidemia associated with type 2 diabetes mellitus    Traumatic tear of left rotator cuff    Other chronic pain    Left shoulder pain    Complete tear of left rotator cuff    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 associated with type 2 diabetes mellitus    Lumbar radiculopathy    Diabetic ulcer of toe of left foot associated with  type 2 diabetes mellitus, with necrosis of muscle    Ulcer of left foot    Hypotension due to medication    Dermatomyositis    Postprocedural hypotension    Advanced directives, counseling/discussion    Cocaine abuse with intoxication    Schizoaffective disorder, depressive type    Chronic obstructive pulmonary disease    MDD (major depressive disorder), recurrent episode, moderate    DOMINIK (generalized anxiety disorder)    Bilateral carpal tunnel syndrome    Rotator cuff tear arthropathy of left shoulder    Sacroiliac dysfunction    Atherosclerosis of native coronary artery of native heart with unstable angina pectoris    Coronary vasospasm    Cocaine abuse    Osteopenia    Atherosclerosis of aorta    Cardiac asthma    Pre-operative respiratory examination    Chronic gout of multiple sites       Current Outpatient Medications:     acetaminophen (TYLENOL) 500 MG tablet, Take 2 tablets (1,000 mg total) by mouth every 8 (eight) hours as needed for Pain., Disp: 60 tablet, Rfl: 0    albuterol (PROVENTIL) 2.5 mg /3 mL (0.083 %) nebulizer solution, Take 3 mLs (2.5 mg total) by nebulization every 4 to 6 hours as needed for Wheezing or Shortness of Breath., Disp: 360 mL, Rfl: 11    atorvastatin (LIPITOR) 40 MG tablet, TAKE 1 TABLET BY MOUTH EVERY EVENING, Disp: 90 tablet, Rfl: 3    azaTHIOprine (IMURAN) 50 mg Tab, Take 1 tablet (50 mg total) by mouth once daily., Disp: 90 tablet, Rfl: 0    blood sugar diagnostic Strp, 1 each by Misc.(Non-Drug; Combo Route) route 4 (four) times daily., Disp: 300 each, Rfl: 3    blood-glucose meter (TRUE METRIX GLUCOSE METER) kit, Use as instructed to test blood glucose meter daily., Disp: 1 each, Rfl: 1    ELIQUIS 5 mg Tab, Take 1 tablet (5 mg total) by mouth 2 (two) times daily., Disp: 180 tablet, Rfl: 1    fluticasone-salmeterol diskus inhaler 250-50 mcg, Inhale 1 puff into the lungs 2 (two) times daily. Controller, Disp: 180 each, Rfl: 3    furosemide (LASIX)  40 MG tablet, Take 1 tablet (40 mg total) by mouth once daily., Disp: 30 tablet, Rfl: 3    gabapentin (NEURONTIN) 800 MG tablet, Take 1 tablet (800 mg total) by mouth 3 (three) times daily., Disp: 270 tablet, Rfl: 4    HYDROcodone-acetaminophen (NORCO) 7.5-325 mg per tablet, SMARTSI Tablet(s) By Mouth Every 12 Hours PRN, Disp: , Rfl:     insulin aspart U-100 (NOVOLOG FLEXPEN U-100 INSULIN) 100 unit/mL (3 mL) InPn pen, INJECT 10 UNITS SUBCUTANEOUSLY THREE TIMES DAILY WITH MEALS, Disp: 15 mL, Rfl: 0    insulin degludec (TRESIBA FLEXTOUCH U-200) 200 unit/mL (3 mL) insulin pen, Inject 40 Units into the skin once daily. (Patient taking differently: Inject 38 Units into the skin once daily.), Disp: 6 pen, Rfl: 3    ipratropium (ATROVENT) 0.02 % nebulizer solution, INHALE THE CONTENTS OF 1 VIAL VIA NEBULIZER FOUR TIMES DAILY, Disp: 1 Box, Rfl: 0    isosorbide mononitrate (IMDUR) 60 MG 24 hr tablet, Take 1 tablet (60 mg total) by mouth once daily., Disp: 90 tablet, Rfl: 1    JARDIANCE 25 mg tablet, TAKE 1 TABLET(25 MG) BY MOUTH EVERY DAY, Disp: 90 tablet, Rfl: 1    latanoprost 0.005 % ophthalmic solution, INSTILL 1 DROP INTO BOTH EYES ONE TIME DAILY, Disp: 5 mL, Rfl: 3    lisinopriL (PRINIVIL,ZESTRIL) 30 MG tablet, Take 1 tablet by mouth once daily., Disp: , Rfl:     metoprolol succinate (TOPROL-XL) 50 MG 24 hr tablet, Take 1 tablet (50 mg total) by mouth every evening., Disp: 90 tablet, Rfl: 3    MICRO THIN LANCETS 33 gauge Misc, , Disp: , Rfl:     tacrolimus (PROTOPIC) 0.1 % ointment, Apply topically 2 (two) times daily., Disp: 100 g, Rfl: 2    tiotropium (SPIRIVA WITH HANDIHALER) 18 mcg inhalation capsule, INHALE THE CONTENTS OF 1 CAPSULE ONE TIME DAILY (CONTROLLER), Disp: 90 capsule, Rfl: 3    traMADoL (ULTRAM) 50 mg tablet, Take 1 tablet (50 mg total) by mouth every 8 (eight) hours as needed for Pain., Disp: 15 tablet, Rfl: 0    traZODone (DESYREL) 100 MG tablet, Take 2 tablets (200 mg total) by mouth  every evening., Disp: 180 tablet, Rfl: 1    VIOS AEROSOL DELIVERY SYSTEM Shefali, USE AS DIRESTED, Disp: , Rfl: 0    allopurinoL (ZYLOPRIM) 100 MG tablet, Take 1 tablet (100 mg total) by mouth once daily., Disp: 90 tablet, Rfl: 1    amLODIPine (NORVASC) 2.5 MG tablet, Take 1 tablet (2.5 mg total) by mouth every evening., Disp: 90 tablet, Rfl: 1    baclofen (LIORESAL) 10 MG tablet, Take 1 tablet (10 mg total) by mouth once daily., Disp: 90 tablet, Rfl: 0    doxycycline (MONODOX) 100 MG capsule, Take 1 capsule (100 mg total) by mouth 2 (two) times daily. Do not take with milk or yogurt for 10 days, Disp: 20 capsule, Rfl: 0    OZEMPIC 1 mg/dose (2 mg/1.5 mL) PnIj, Inject 1 mg into the skin every 7 days., Disp: 6 pen, Rfl: 1    pantoprazole (PROTONIX) 40 MG tablet, Take 1 tablet (40 mg total) by mouth once daily., Disp: 90 tablet, Rfl: 1    ranolazine (RANEXA) 500 MG Tb12, Take 1 tablet (500 mg total) by mouth 2 (two) times daily., Disp: 60 tablet, Rfl: 11    sertraline (ZOLOFT) 100 MG tablet, Take 1 tablet (100 mg total) by mouth every evening., Disp: 90 tablet, Rfl: 1    tamsulosin (FLOMAX) 0.4 mg Cap, Take 1 capsule (0.4 mg total) by mouth once daily., Disp: 90 capsule, Rfl: 1    triamcinolone acetonide 0.1% (KENALOG) 0.1 % ointment, Apply topically 2 (two) times daily. Use on legs and back, Disp: 454 g, Rfl: 0  Health Maintenance Due   Topic Date Due    COVID-19 Vaccine (4 - Booster for Pfizer series) 03/19/2022    Eye Exam  04/06/2022    Lipid Panel  05/25/2022    Diabetes Urine Screening  07/08/2022       Review of Systems   Constitutional: Negative for activity change, appetite change, chills, diaphoresis, fatigue, fever and unexpected weight change.   HENT: Negative.  Negative for congestion, hearing loss, postnasal drip, rhinorrhea, sore throat, trouble swallowing and voice change.    Eyes: Negative.  Negative for visual disturbance.   Respiratory: Negative.  Negative for cough, choking, chest  "tightness and shortness of breath.    Cardiovascular: Negative for chest pain, palpitations and leg swelling.   Gastrointestinal: Negative for abdominal distention, abdominal pain, blood in stool, constipation, diarrhea, nausea and vomiting.   Endocrine: Negative for cold intolerance, heat intolerance, polydipsia and polyuria.   Genitourinary: Negative.  Negative for difficulty urinating and frequency.   Musculoskeletal: Positive for arthralgias, back pain and myalgias. Negative for gait problem and joint swelling.   Skin: Negative for color change, pallor, rash and wound.   Neurological: Negative for dizziness, tremors, weakness, light-headedness, numbness and headaches.   Hematological: Negative for adenopathy.   Psychiatric/Behavioral: Negative for behavioral problems, confusion, self-injury, sleep disturbance and suicidal ideas. The patient is not nervous/anxious.      Objective:   /80 (BP Location: Left arm, Patient Position: Sitting, BP Method: Large (Manual))   Pulse 66   Temp 99 °F (37.2 °C) (Tympanic)   Ht 5' 5" (1.651 m)   Wt 86.4 kg (190 lb 7.6 oz)   SpO2 95%   BMI 31.70 kg/m²     Physical Exam  Vitals reviewed.   Constitutional:       General: He is not in acute distress.     Appearance: Normal appearance. He is well-developed. He is not ill-appearing or diaphoretic.   HENT:      Head: Normocephalic and atraumatic.      Right Ear: External ear normal.      Left Ear: External ear normal.      Nose: Nose normal.   Eyes:      Conjunctiva/sclera: Conjunctivae normal.      Pupils: Pupils are equal, round, and reactive to light.   Cardiovascular:      Rate and Rhythm: Normal rate. Rhythm irregular.      Heart sounds: Normal heart sounds. No murmur heard.    No friction rub. No gallop.   Pulmonary:      Effort: Pulmonary effort is normal. No respiratory distress.      Breath sounds: Normal breath sounds. No wheezing or rales.   Chest:      Chest wall: No tenderness.   Abdominal:      General: There " is no distension.      Palpations: Abdomen is soft.      Tenderness: There is no abdominal tenderness.   Musculoskeletal:         General: Normal range of motion.        Arms:       Cervical back: Normal range of motion and neck supple.   Lymphadenopathy:      Cervical: No cervical adenopathy.   Skin:     General: Skin is warm and dry.   Neurological:      Mental Status: He is alert and oriented to person, place, and time.   Psychiatric:         Behavior: Behavior normal.         Thought Content: Thought content normal.         Judgment: Judgment normal.       Lab Results   Component Value Date    URICACID 11.1 (H) 06/23/2020         Assessment:     1. At risk for medication noncompliance    2. Xerosis cutis    3. Pruritus    4. Benign prostatic hyperplasia with urinary hesitancy    5. Gastroesophageal reflux disease    6. Nonobstructive coronary artery disease of native artery of native heart    7. PAF (paroxysmal atrial fibrillation)    8. Furuncle    9. 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 associated with type 2 diabetes mellitus    10. MDD (major depressive disorder), recurrent episode, moderate    11. DOMINIK (generalized anxiety disorder)    12. Type 2 diabetes mellitus with mild nonproliferative retinopathy and macular edema, with long-term current use of insulin, unspecified laterality    13. Essential hypertension    14. Osteoarthritis of spine with radiculopathy, lumbar region    15. Lumbar radiculopathy    16. Chronic gout of multiple sites, unspecified cause      Plan:   At risk for medication noncompliance  -     SUBSEQUENT HOME HEALTH ORDERS    Xerosis cutis  -     triamcinolone acetonide 0.1% (KENALOG) 0.1 % ointment; Apply topically 2 (two) times daily. Use on legs and back  Dispense: 454 g; Refill: 0    Pruritus  -     triamcinolone acetonide 0.1% (KENALOG) 0.1 % ointment; Apply topically 2 (two) times daily. Use on legs and back  Dispense: 454 g; Refill:  0    Benign prostatic hyperplasia with urinary hesitancy  -     tamsulosin (FLOMAX) 0.4 mg Cap; Take 1 capsule (0.4 mg total) by mouth once daily.  Dispense: 90 capsule; Refill: 1    Gastroesophageal reflux disease  -     pantoprazole (PROTONIX) 40 MG tablet; Take 1 tablet (40 mg total) by mouth once daily.  Dispense: 90 tablet; Refill: 1    Nonobstructive coronary artery disease of native artery of native heart  -     Ambulatory referral/consult to Cardiology    PAF (paroxysmal atrial fibrillation)  -     Ambulatory referral/consult to Cardiology    Furunc  -     doxycycline (MONODOX) 100 MG capsule; Take 1 capsule (100 mg total) by mouth 2 (two) times daily. 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  Dispense: 90 tablet; Refill: 1    DOMINIK (generalized anxiety disorder)  -     sertraline (ZOLOFT) 100 MG tablet; Take 1 tablet (100 mg total) by mouth every evening.  Dispense: 90 tablet; Refill: 1    Type 2 diabetes mellitus with mild nonproliferative retinopathy and macular edema, with long-term current use of insulin, unspecified laterality  -     OZEMPIC 1 mg/dose (2 mg/1.5 mL) PnIj; Inject 1 mg into the skin every 7 days.  Dispense: 6 pen; Refill: 1    Essential hypertension  -     amLODIPine (NORVASC) 2.5 MG tablet; Take 1 tablet (2.5 mg total) by mouth every evening.  Dispense: 90 tablet; Refill: 1    Osteoarthritis of spine with radiculopathy, lumbar region    Lumbar radiculopathy  -     baclofen (LIORESAL) 10 MG tablet; Take 1 tablet (10 mg total) by mouth once daily.  Dispense: 90 tablet; Refill: 0    Chronic gout of multiple sites, unspecified cause  -     allopurinoL (ZYLOPRIM) 100 MG tablet;  Take 1 tablet (100 mg total) by mouth once daily.  Dispense: 90 tablet; Refill: 1      Follow up in about 3 months (around 9/9/2022), or if symptoms worsen or fail to improve.

## 2022-06-09 NOTE — TELEPHONE ENCOUNTER
----- Message from Vimal Varela sent at 2/13/2019 12:07 PM CST -----  Type:  RX Refill Request    Who Called: Crow Green  Refill or New Rx:refill  RX Name and Strength:HYDROcodone-acetaminophen (NORCO)  mg per tablet  How is the patient currently taking it? (ex. 1XDay):2xday  Is this a 30 day or 90 day RX:  Preferred Pharmacy with phone number:  [id number] - [place], [place] - [address] [facility]  [address] [facility] [address]  Phone: [phone] Fax: [phone]  Local or Mail Order:local  Ordering Provider:Dr. Lambert  Would the patient rather a call back or a response via [facility]? Call back  Best Call Back Number:[phone]  Additional Information: The pt is requesting a call back due to several request to have the rx filled without the pharmacy receiving the medication.     Detail Level: Simple Plan: Pt and pt’s mother reports that lesion has been getting smaller, and is not painful. Will monitor for now. Discussed I&D or excision as treatment options. Pt and pt’s mother opt to monitor for now and return for further evaluation in 3 months.\\nPrescribed lidocaine-prilocaine cream, for future procedures if necessary in the future. Plan: Acne is mild, so avoid Rx for now. Consider adapalene or topical antibiotics if progressing. Otc Regimen: Neutrogena Sal acid wash in shower

## 2022-06-10 ENCOUNTER — LAB VISIT (OUTPATIENT)
Dept: LAB | Facility: HOSPITAL | Age: 67
End: 2022-06-10
Attending: FAMILY MEDICINE
Payer: MEDICARE

## 2022-06-10 DIAGNOSIS — E11.42 DIABETIC POLYNEUROPATHY ASSOCIATED WITH TYPE 2 DIABETES MELLITUS: ICD-10-CM

## 2022-06-10 DIAGNOSIS — E11.9 TYPE 2 DIABETES MELLITUS WITHOUT COMPLICATION: ICD-10-CM

## 2022-06-10 LAB
ALBUMIN SERPL BCP-MCNC: 2.9 G/DL (ref 3.5–5.2)
ALP SERPL-CCNC: 69 U/L (ref 55–135)
ALT SERPL W/O P-5'-P-CCNC: 22 U/L (ref 10–44)
ANION GAP SERPL CALC-SCNC: 9 MMOL/L (ref 8–16)
AST SERPL-CCNC: 27 U/L (ref 10–40)
BILIRUB SERPL-MCNC: 0.2 MG/DL (ref 0.1–1)
BUN SERPL-MCNC: 11 MG/DL (ref 8–23)
CALCIUM SERPL-MCNC: 8.8 MG/DL (ref 8.7–10.5)
CHLORIDE SERPL-SCNC: 107 MMOL/L (ref 95–110)
CHOLEST SERPL-MCNC: 152 MG/DL (ref 120–199)
CHOLEST/HDLC SERPL: 3.5 {RATIO} (ref 2–5)
CO2 SERPL-SCNC: 24 MMOL/L (ref 23–29)
CREAT SERPL-MCNC: 1.1 MG/DL (ref 0.5–1.4)
EST. GFR  (AFRICAN AMERICAN): >60 ML/MIN/1.73 M^2
EST. GFR  (NON AFRICAN AMERICAN): >60 ML/MIN/1.73 M^2
ESTIMATED AVG GLUCOSE: 137 MG/DL (ref 68–131)
GLUCOSE SERPL-MCNC: 217 MG/DL (ref 70–110)
HBA1C MFR BLD: 6.4 % (ref 4–5.6)
HDLC SERPL-MCNC: 43 MG/DL (ref 40–75)
HDLC SERPL: 28.3 % (ref 20–50)
LDLC SERPL CALC-MCNC: 88.8 MG/DL (ref 63–159)
NONHDLC SERPL-MCNC: 109 MG/DL
POTASSIUM SERPL-SCNC: 4.6 MMOL/L (ref 3.5–5.1)
PROT SERPL-MCNC: 6.5 G/DL (ref 6–8.4)
SODIUM SERPL-SCNC: 140 MMOL/L (ref 136–145)
TRIGL SERPL-MCNC: 101 MG/DL (ref 30–150)

## 2022-06-10 PROCEDURE — 36415 COLL VENOUS BLD VENIPUNCTURE: CPT | Performed by: FAMILY MEDICINE

## 2022-06-10 PROCEDURE — 83036 HEMOGLOBIN GLYCOSYLATED A1C: CPT | Performed by: FAMILY MEDICINE

## 2022-06-10 PROCEDURE — G0180 PR HOME HEALTH MD CERTIFICATION: ICD-10-PCS | Mod: ,,, | Performed by: PHYSICIAN ASSISTANT

## 2022-06-10 PROCEDURE — 80061 LIPID PANEL: CPT | Performed by: NURSE PRACTITIONER

## 2022-06-10 PROCEDURE — G0180 MD CERTIFICATION HHA PATIENT: HCPCS | Mod: ,,, | Performed by: PHYSICIAN ASSISTANT

## 2022-06-10 PROCEDURE — 80053 COMPREHEN METABOLIC PANEL: CPT | Performed by: FAMILY MEDICINE

## 2022-06-13 ENCOUNTER — TELEPHONE (OUTPATIENT)
Dept: FAMILY MEDICINE | Facility: CLINIC | Age: 67
End: 2022-06-13
Payer: MEDICARE

## 2022-06-13 NOTE — TELEPHONE ENCOUNTER
----- Message from Yenifer Rea sent at 6/13/2022  3:18 PM CDT -----  Contact: [phone]  Pt is requesting a prescription to fix his electric wheelchair that is currently broken. He would like this to go to [facility]. Pt states DME company is called [employer] @ phone # [phone]. Pt states he missed a call earlier today and would like a call back today please. Thanks    
PACU

## 2022-06-14 ENCOUNTER — TELEPHONE (OUTPATIENT)
Dept: INTERNAL MEDICINE | Facility: CLINIC | Age: 67
End: 2022-06-14
Payer: MEDICARE

## 2022-06-14 ENCOUNTER — TELEPHONE (OUTPATIENT)
Dept: CARDIOLOGY | Facility: CLINIC | Age: 67
End: 2022-06-14
Payer: MEDICARE

## 2022-06-14 ENCOUNTER — PATIENT OUTREACH (OUTPATIENT)
Dept: ADMINISTRATIVE | Facility: HOSPITAL | Age: 67
End: 2022-06-14
Payer: MEDICARE

## 2022-06-14 DIAGNOSIS — I50.42 CHRONIC COMBINED SYSTOLIC AND DIASTOLIC CONGESTIVE HEART FAILURE: Primary | ICD-10-CM

## 2022-06-14 NOTE — TELEPHONE ENCOUNTER
Returned call to patient no answer, left a message informing patient we have received request for wheelchair repairs but has not been completed and will let him know once it has been done.    Patient is requesting a prescription foe necessary repairs on power chair    Need to state Patient is still using the power chair, still medically necessary in the home for his MRADL's. Please make necessary repairs  Along with diagnosis codes on prescription    Fax to [phone]

## 2022-06-14 NOTE — TELEPHONE ENCOUNTER
----- Message from Skylar Whitt sent at 6/14/2022  2:05 PM CDT -----  Please call pt @ [phone], pt states he call yesterday to talk with Jocelyn regarding wheelchair respair.

## 2022-06-14 NOTE — TELEPHONE ENCOUNTER
----- Message from Liliam May sent at 6/14/2022  3:25 PM CDT -----  Contact: Patient [phone]  Patient is returning a phone call.  Who left a message for the patient: Kae  Does patient know what this is regarding:  yes  Would you like a call back, or a response through your [facility] portal?:   call  Comments:

## 2022-06-14 NOTE — TELEPHONE ENCOUNTER
----- Message from Sachi Rendon MA sent at 6/14/2022  1:04 PM CDT -----  Contact: Brigette/ [facility]  Please advise  ----- Message -----  From: Sangeetha Xie  Sent: 6/14/2022  12:58 PM CDT  To: Pavel Chapa Staff    Brigette called and stated the patient is having fluid retention issues and it is causing him to gain weight and have shortness of breath. On last Thursday the patient weighed 190 lbs and then today he weighed 200.6 lbs. Please quincy her back at [phone] and advise what she needs to do for the patient.

## 2022-06-15 ENCOUNTER — LAB VISIT (OUTPATIENT)
Dept: LAB | Facility: HOSPITAL | Age: 67
End: 2022-06-15
Attending: INTERNAL MEDICINE
Payer: MEDICARE

## 2022-06-15 DIAGNOSIS — I50.32 CHRONIC DIASTOLIC HEART FAILURE: Primary | ICD-10-CM

## 2022-06-15 DIAGNOSIS — I50.32 CHRONIC DIASTOLIC HEART FAILURE: ICD-10-CM

## 2022-06-15 LAB
BASOPHILS # BLD AUTO: 0.02 K/UL (ref 0–0.2)
BASOPHILS NFR BLD: 0.3 % (ref 0–1.9)
BNP SERPL-MCNC: 230 PG/ML (ref 0–99)
DIFFERENTIAL METHOD: ABNORMAL
EOSINOPHIL # BLD AUTO: 0.2 K/UL (ref 0–0.5)
EOSINOPHIL NFR BLD: 2.1 % (ref 0–8)
ERYTHROCYTE [DISTWIDTH] IN BLOOD BY AUTOMATED COUNT: 18.3 % (ref 11.5–14.5)
HCT VFR BLD AUTO: 40.1 % (ref 40–54)
HGB BLD-MCNC: 12.4 G/DL (ref 14–18)
IMM GRANULOCYTES # BLD AUTO: 0.04 K/UL (ref 0–0.04)
IMM GRANULOCYTES NFR BLD AUTO: 0.6 % (ref 0–0.5)
LYMPHOCYTES # BLD AUTO: 1.7 K/UL (ref 1–4.8)
LYMPHOCYTES NFR BLD: 23.9 % (ref 18–48)
MCH RBC QN AUTO: 26.2 PG (ref 27–31)
MCHC RBC AUTO-ENTMCNC: 30.9 G/DL (ref 32–36)
MCV RBC AUTO: 85 FL (ref 82–98)
MONOCYTES # BLD AUTO: 0.6 K/UL (ref 0.3–1)
MONOCYTES NFR BLD: 7.8 % (ref 4–15)
NEUTROPHILS # BLD AUTO: 4.6 K/UL (ref 1.8–7.7)
NEUTROPHILS NFR BLD: 65.3 % (ref 38–73)
NRBC BLD-RTO: 0 /100 WBC
PLATELET # BLD AUTO: 186 K/UL (ref 150–450)
PMV BLD AUTO: 11.8 FL (ref 9.2–12.9)
RBC # BLD AUTO: 4.74 M/UL (ref 4.6–6.2)
WBC # BLD AUTO: 7.07 K/UL (ref 3.9–12.7)

## 2022-06-15 PROCEDURE — 36415 COLL VENOUS BLD VENIPUNCTURE: CPT | Mod: PO | Performed by: INTERNAL MEDICINE

## 2022-06-15 PROCEDURE — 85025 COMPLETE CBC W/AUTO DIFF WBC: CPT | Mod: PO | Performed by: INTERNAL MEDICINE

## 2022-06-15 PROCEDURE — 83880 ASSAY OF NATRIURETIC PEPTIDE: CPT | Mod: PO | Performed by: INTERNAL MEDICINE

## 2022-06-21 ENCOUNTER — TELEPHONE (OUTPATIENT)
Dept: CARDIOLOGY | Facility: CLINIC | Age: 67
End: 2022-06-21
Payer: MEDICARE

## 2022-06-21 NOTE — TELEPHONE ENCOUNTER
----- Message from Eduard Zhao MD sent at 6/19/2022  3:42 PM CDT -----  The lab showed CHF controlled and hgb up to 12  The patient has been notified of this information and all questions answered.

## 2022-06-23 ENCOUNTER — TELEPHONE (OUTPATIENT)
Dept: FAMILY MEDICINE | Facility: CLINIC | Age: 67
End: 2022-06-23
Payer: MEDICARE

## 2022-06-23 ENCOUNTER — TELEPHONE (OUTPATIENT)
Dept: INTERNAL MEDICINE | Facility: CLINIC | Age: 67
End: 2022-06-23
Payer: MEDICARE

## 2022-06-23 NOTE — TELEPHONE ENCOUNTER
----- Message from Ginny Treviño sent at 6/23/2022  3:03 PM CDT -----  Contact: [phone]  Patient would like to consult with a nurse in regards to his prescriptions. Please call back at [phone]. Thanks rs

## 2022-06-23 NOTE — TELEPHONE ENCOUNTER
----- Message from Edilma Tyler sent at 6/23/2022  3:47 PM CDT -----  Contact: Pt @[phone]  1MEDICALADVICE     Patient is calling for Medical Advice regarding:    HYDROcodone-acetaminophen (NORCO) 7.5-325 mg per tablet        Would like response via [employer]:  call back     Comments:   Pt is requesting a call back to discuss his medication. He is looking for Nurse Farr.

## 2022-06-24 ENCOUNTER — TELEPHONE (OUTPATIENT)
Dept: FAMILY MEDICINE | Facility: CLINIC | Age: 67
End: 2022-06-24
Payer: MEDICARE

## 2022-06-24 NOTE — TELEPHONE ENCOUNTER
----- Message from Lissa Lund sent at 6/24/2022  9:55 AM CDT -----  .Type:  Patient Returning Call    Who Called:Pt   Who Left Message for Patient:Jocelyn   Does the patient know what this is regarding?:  Would the patient rather a call back or a response via [facility]?   Call back   Best Call Back Number:.[phone]    Additional Information: Pt states he is returning call please call him again     Thanks pramod

## 2022-06-27 ENCOUNTER — TELEPHONE (OUTPATIENT)
Dept: INTERNAL MEDICINE | Facility: CLINIC | Age: 67
End: 2022-06-27
Payer: MEDICARE

## 2022-06-27 ENCOUNTER — EXTERNAL HOME HEALTH (OUTPATIENT)
Dept: HOME HEALTH SERVICES | Facility: HOSPITAL | Age: 67
End: 2022-06-27
Payer: MEDICARE

## 2022-06-27 NOTE — PROGRESS NOTES
1/9/2018 Per [employer], Message was delivered on an answering machine//nw  
PAST SURGICAL HISTORY:  Colon polyp     H/O colectomy 1990, colon cancer-surgical management X2    H/O cystoscopy bladder calculi

## 2022-06-27 NOTE — TELEPHONE ENCOUNTER
----- Message from Briana Pena sent at 6/27/2022 10:21 AM CDT -----  Contact: Self [phone]  Patient is returning a phone call.    Who left a message for the patient: nurse    Does patient know what this is regarding:  transportation     Would you like a call back, or a response through your [facility] portal?:   call back    Comments:

## 2022-06-28 ENCOUNTER — OFFICE VISIT (OUTPATIENT)
Dept: INTERNAL MEDICINE | Facility: CLINIC | Age: 67
End: 2022-06-28
Payer: MEDICARE

## 2022-06-28 VITALS
WEIGHT: 194.69 LBS | OXYGEN SATURATION: 96 % | HEIGHT: 65 IN | TEMPERATURE: 97 F | DIASTOLIC BLOOD PRESSURE: 80 MMHG | BODY MASS INDEX: 32.44 KG/M2 | SYSTOLIC BLOOD PRESSURE: 110 MMHG | HEART RATE: 68 BPM

## 2022-06-28 DIAGNOSIS — N18.30 STAGE 3 CHRONIC KIDNEY DISEASE, UNSPECIFIED WHETHER STAGE 3A OR 3B CKD: ICD-10-CM

## 2022-06-28 DIAGNOSIS — F25.1 SCHIZOAFFECTIVE DISORDER, DEPRESSIVE TYPE: ICD-10-CM

## 2022-06-28 DIAGNOSIS — I10 ESSENTIAL HYPERTENSION: ICD-10-CM

## 2022-06-28 DIAGNOSIS — G47.33 OSA (OBSTRUCTIVE SLEEP APNEA): Primary | ICD-10-CM

## 2022-06-28 DIAGNOSIS — E11.3312 MODERATE NONPROLIFERATIVE DIABETIC RETINOPATHY OF LEFT EYE WITH MACULAR EDEMA ASSOCIATED WITH TYPE 2 DIABETES MELLITUS: ICD-10-CM

## 2022-06-28 DIAGNOSIS — Z89.412 ACQUIRED ABSENCE OF LEFT GREAT TOE: ICD-10-CM

## 2022-06-28 DIAGNOSIS — G47.33 OSA ON CPAP: ICD-10-CM

## 2022-06-28 DIAGNOSIS — I70.0 ATHEROSCLEROSIS OF AORTA: ICD-10-CM

## 2022-06-28 PROBLEM — F14.129 COCAINE ABUSE WITH INTOXICATION: Status: RESOLVED | Noted: 2020-08-05 | Resolved: 2022-06-28

## 2022-06-28 PROBLEM — Z01.811 PRE-OPERATIVE RESPIRATORY EXAMINATION: Status: RESOLVED | Noted: 2021-12-01 | Resolved: 2022-06-28

## 2022-06-28 PROCEDURE — 3079F DIAST BP 80-89 MM HG: CPT | Mod: CPTII,S$GLB,, | Performed by: PHYSICIAN ASSISTANT

## 2022-06-28 PROCEDURE — 3008F PR BODY MASS INDEX (BMI) DOCUMENTED: ICD-10-PCS | Mod: CPTII,S$GLB,, | Performed by: PHYSICIAN ASSISTANT

## 2022-06-28 PROCEDURE — 1160F PR REVIEW ALL MEDS BY PRESCRIBER/CLIN PHARMACIST DOCUMENTED: ICD-10-PCS | Mod: CPTII,S$GLB,, | Performed by: PHYSICIAN ASSISTANT

## 2022-06-28 PROCEDURE — 3074F SYST BP LT 130 MM HG: CPT | Mod: CPTII,S$GLB,, | Performed by: PHYSICIAN ASSISTANT

## 2022-06-28 PROCEDURE — 4010F ACE/ARB THERAPY RXD/TAKEN: CPT | Mod: CPTII,S$GLB,, | Performed by: PHYSICIAN ASSISTANT

## 2022-06-28 PROCEDURE — 99213 OFFICE O/P EST LOW 20 MIN: CPT | Mod: S$GLB,,, | Performed by: PHYSICIAN ASSISTANT

## 2022-06-28 PROCEDURE — 3079F PR MOST RECENT DIASTOLIC BLOOD PRESSURE 80-89 MM HG: ICD-10-PCS | Mod: CPTII,S$GLB,, | Performed by: PHYSICIAN ASSISTANT

## 2022-06-28 PROCEDURE — 3044F HG A1C LEVEL LT 7.0%: CPT | Mod: CPTII,S$GLB,, | Performed by: PHYSICIAN ASSISTANT

## 2022-06-28 PROCEDURE — 3074F PR MOST RECENT SYSTOLIC BLOOD PRESSURE < 130 MM HG: ICD-10-PCS | Mod: CPTII,S$GLB,, | Performed by: PHYSICIAN ASSISTANT

## 2022-06-28 PROCEDURE — 99999 PR PBB SHADOW E&M-EST. PATIENT-LVL V: CPT | Mod: PBBFAC,,, | Performed by: PHYSICIAN ASSISTANT

## 2022-06-28 PROCEDURE — 99213 PR OFFICE/OUTPT VISIT, EST, LEVL III, 20-29 MIN: ICD-10-PCS | Mod: S$GLB,,, | Performed by: PHYSICIAN ASSISTANT

## 2022-06-28 PROCEDURE — 1101F PT FALLS ASSESS-DOCD LE1/YR: CPT | Mod: CPTII,S$GLB,, | Performed by: PHYSICIAN ASSISTANT

## 2022-06-28 PROCEDURE — 3008F BODY MASS INDEX DOCD: CPT | Mod: CPTII,S$GLB,, | Performed by: PHYSICIAN ASSISTANT

## 2022-06-28 PROCEDURE — 3288F FALL RISK ASSESSMENT DOCD: CPT | Mod: CPTII,S$GLB,, | Performed by: PHYSICIAN ASSISTANT

## 2022-06-28 PROCEDURE — 1159F MED LIST DOCD IN RCRD: CPT | Mod: CPTII,S$GLB,, | Performed by: PHYSICIAN ASSISTANT

## 2022-06-28 PROCEDURE — 4010F PR ACE/ARB THEARPY RXD/TAKEN: ICD-10-PCS | Mod: CPTII,S$GLB,, | Performed by: PHYSICIAN ASSISTANT

## 2022-06-28 PROCEDURE — 99999 PR PBB SHADOW E&M-EST. PATIENT-LVL V: ICD-10-PCS | Mod: PBBFAC,,, | Performed by: PHYSICIAN ASSISTANT

## 2022-06-28 PROCEDURE — 3044F PR MOST RECENT HEMOGLOBIN A1C LEVEL <7.0%: ICD-10-PCS | Mod: CPTII,S$GLB,, | Performed by: PHYSICIAN ASSISTANT

## 2022-06-28 PROCEDURE — 1160F RVW MEDS BY RX/DR IN RCRD: CPT | Mod: CPTII,S$GLB,, | Performed by: PHYSICIAN ASSISTANT

## 2022-06-28 PROCEDURE — 1159F PR MEDICATION LIST DOCUMENTED IN MEDICAL RECORD: ICD-10-PCS | Mod: CPTII,S$GLB,, | Performed by: PHYSICIAN ASSISTANT

## 2022-06-28 PROCEDURE — 3288F PR FALLS RISK ASSESSMENT DOCUMENTED: ICD-10-PCS | Mod: CPTII,S$GLB,, | Performed by: PHYSICIAN ASSISTANT

## 2022-06-28 PROCEDURE — 1101F PR PT FALLS ASSESS DOC 0-1 FALLS W/OUT INJ PAST YR: ICD-10-PCS | Mod: CPTII,S$GLB,, | Performed by: PHYSICIAN ASSISTANT

## 2022-06-28 RX ORDER — RANOLAZINE 500 MG/1
500 TABLET, EXTENDED RELEASE ORAL 2 TIMES DAILY
Qty: 60 TABLET | Refills: 11 | Status: SHIPPED | OUTPATIENT
Start: 2022-06-28 | End: 2022-07-18 | Stop reason: SDUPTHER
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
## 2022-06-28 NOTE — TELEPHONE ENCOUNTER
----- Message from Kaila Vaughan sent at 6/28/2022  2:26 PM CDT -----  Type:  RX Refill Request    Who Called: pt  Refill or New Rx:refill  RX Name and Strength:ranolazine 500 mg   How is the patient currently taking it? (ex. 1XDay):2Xday  Is this a 30 day or 90 day RX:90   Preferred Pharmacy with phone number: [facility]   Local or Mail Order:mail order  Ordering Provider:Dr Zhao  Would the patient rather a call back or a response via [facility]? call  Best Call Back Number:[phone]  Additional Information: States he only has one pill left. He will also need a refill to go to [facility] in [place], while he waits for his mail order to come in.   Thank you

## 2022-06-30 ENCOUNTER — CARE AT HOME (OUTPATIENT)
Dept: HOME HEALTH SERVICES | Facility: CLINIC | Age: 67
End: 2022-06-30
Payer: MEDICARE

## 2022-06-30 DIAGNOSIS — Z09 FOLLOW UP: Primary | ICD-10-CM

## 2022-06-30 PROCEDURE — 99499 UNLISTED E&M SERVICE: CPT | Mod: S$GLB,,, | Performed by: NURSE PRACTITIONER

## 2022-06-30 PROCEDURE — 99499 NO LOS: ICD-10-PCS | Mod: S$GLB,,, | Performed by: NURSE PRACTITIONER

## 2022-06-30 NOTE — PROGRESS NOTES
Set up time with patient the night before. Went to patient's home this morning and there was no answer at the door and no answer on the phone. Left a voice mail and patient called me back later and will reschedule for next week.

## 2022-07-06 ENCOUNTER — DOCUMENTATION ONLY (OUTPATIENT)
Dept: REHABILITATION | Facility: HOSPITAL | Age: 67
End: 2022-07-06
Payer: MEDICARE

## 2022-07-06 ENCOUNTER — TELEPHONE (OUTPATIENT)
Dept: PULMONOLOGY | Facility: CLINIC | Age: 67
End: 2022-07-06
Payer: MEDICARE

## 2022-07-06 ENCOUNTER — PATIENT MESSAGE (OUTPATIENT)
Dept: INTERNAL MEDICINE | Facility: CLINIC | Age: 67
End: 2022-07-06
Payer: MEDICARE

## 2022-07-06 NOTE — TELEPHONE ENCOUNTER
Patient no call/ no show. Called patient and patient stated he needed to reschedule both appointments today. Patient also has appointment with Lejeune, NP at 11:20; Will notify her and staff.

## 2022-07-06 NOTE — PROGRESS NOTES
[facility]  Physical Therapy Discharge Note    [id number]    Therapy Diagnosis: No diagnosis found.  Physician: No ref. provider found    Physician Orders: PT Eval and Treat  Medical Diagnosis from Referral: Rotator cuff tear arthropathy of left shoulder  Evaluation Date: 1/21/2021      Date of Last visit: 3/22/2021  Total Visits Received: 7    ASSESSMENT      Patient hasn't been seen since 3/22/2021.    Discharge reason: Patient has not attended therapy since 3/22/2021.    Discharge FOTO Score: 56%    Goals:    Short Term Goals:  6 weeks Progress    1. Pain: Pt will demonstrate improved pain by reports of less than or equal to 7/10 worst pain on the verbal rating scale in order to progress toward maximal functional ability and improve QOL. PC   2. Function: Patient will demonstrate improved function as indicated by a functional limitation score of less than or equal to 58 out of 100 on FOTO. PC   3. Mobility: Patient will improve AROM to 50% of stated goals, listed in objective measures above, in order to progress towards independence with functional activities.  PC   4. Strength: Patient will improve strength to 50% of stated goals, listed in objective measures above, in order to progress towards independence with functional activities.  PM   5. Gait: Patient will demonstrate improved gait mechanics in order to improve functional mobility, improve quality of life, and decrease risk of further injury or fall.  PC   6. HEP: Patient will demonstrate independence with HEP in order to progress toward functional independence. Met   7. Improve postural awareness.  PC      Long Term Goals:  12 weeks Progress   1. Pain: Pt will demonstrate improved pain by reports of less than or equal to 5/10 worst pain on the verbal rating scale in order to progress toward maximal functional ability and improve QOL.   PC   2. Function: Patient will demonstrate  improved function as indicated by a functional limitation score of less than or equal to 45 out of 100 on FOTO. PC   3. Mobility: Patient will improve AROM to stated goals, listed in objective measures above, in order to return to maximal functional potential and improve quality of life. PC   4. Strength: Patient will improve strength to stated goals, listed in objective measures above, in order to improve functional independence and quality of life. PC   5. Gait: Patient will demonstrate normalized gait mechanics with minimal compensation in order to return to PLOF. PC   6. Patient will return to normal ADL's, IADL's, community involvement, recreational activities, and work-related activities with less than or equal to 2/10 pain and maximal function.  PC            Goals Key:  PC= progressing/continue;        PM= partially met;             DC= discontinue        PLAN   This patient is discharged from Physical Therapy.      Fabiola Pino, PT, DPT

## 2022-07-07 DIAGNOSIS — M33.13 DERMATOMYOSITIS: ICD-10-CM

## 2022-07-07 RX ORDER — AZATHIOPRINE 50 MG/1
50 TABLET ORAL DAILY
Qty: 90 TABLET | Refills: 0 | OUTPATIENT
Start: 2022-07-07

## 2022-07-11 ENCOUNTER — TELEPHONE (OUTPATIENT)
Dept: PULMONOLOGY | Facility: CLINIC | Age: 67
End: 2022-07-11
Payer: MEDICARE

## 2022-07-11 NOTE — TELEPHONE ENCOUNTER
----- Message from Sangeetha Xie sent at 7/11/2022  2:07 PM CDT -----  Contact: Crow Munizneth is needing a call back regarding him needing to reschedule his pulmonary lab and office visit. Please call him back at [phone].

## 2022-07-14 ENCOUNTER — DOCUMENT SCAN (OUTPATIENT)
Dept: HOME HEALTH SERVICES | Facility: HOSPITAL | Age: 67
End: 2022-07-14
Payer: MEDICARE

## 2022-07-18 ENCOUNTER — OFFICE VISIT (OUTPATIENT)
Dept: CARDIOLOGY | Facility: CLINIC | Age: 67
End: 2022-07-18
Payer: MEDICARE

## 2022-07-18 VITALS
OXYGEN SATURATION: 95 % | SYSTOLIC BLOOD PRESSURE: 114 MMHG | HEIGHT: 65 IN | DIASTOLIC BLOOD PRESSURE: 78 MMHG | WEIGHT: 187.38 LBS | BODY MASS INDEX: 31.22 KG/M2 | HEART RATE: 85 BPM

## 2022-07-18 DIAGNOSIS — E11.3219 TYPE 2 DIABETES MELLITUS WITH MILD NONPROLIFERATIVE RETINOPATHY AND MACULAR EDEMA, WITH LONG-TERM CURRENT USE OF INSULIN, UNSPECIFIED LATERALITY: ICD-10-CM

## 2022-07-18 DIAGNOSIS — N18.30 STAGE 3 CHRONIC KIDNEY DISEASE, UNSPECIFIED WHETHER STAGE 3A OR 3B CKD: ICD-10-CM

## 2022-07-18 DIAGNOSIS — Z79.4 TYPE 2 DIABETES MELLITUS WITH MILD NONPROLIFERATIVE RETINOPATHY AND MACULAR EDEMA, WITH LONG-TERM CURRENT USE OF INSULIN, UNSPECIFIED LATERALITY: ICD-10-CM

## 2022-07-18 DIAGNOSIS — I50.42 CHRONIC COMBINED SYSTOLIC AND DIASTOLIC CONGESTIVE HEART FAILURE: ICD-10-CM

## 2022-07-18 DIAGNOSIS — I48.0 PAF (PAROXYSMAL ATRIAL FIBRILLATION): Chronic | ICD-10-CM

## 2022-07-18 DIAGNOSIS — I25.118 CORONARY ARTERY DISEASE OF NATIVE ARTERY OF NATIVE HEART WITH STABLE ANGINA PECTORIS: Primary | ICD-10-CM

## 2022-07-18 DIAGNOSIS — E78.5 HYPERLIPIDEMIA ASSOCIATED WITH TYPE 2 DIABETES MELLITUS: ICD-10-CM

## 2022-07-18 DIAGNOSIS — E11.69 HYPERLIPIDEMIA ASSOCIATED WITH TYPE 2 DIABETES MELLITUS: ICD-10-CM

## 2022-07-18 PROCEDURE — 3044F HG A1C LEVEL LT 7.0%: CPT | Mod: CPTII,S$GLB,, | Performed by: INTERNAL MEDICINE

## 2022-07-18 PROCEDURE — 3008F PR BODY MASS INDEX (BMI) DOCUMENTED: ICD-10-PCS | Mod: CPTII,S$GLB,, | Performed by: INTERNAL MEDICINE

## 2022-07-18 PROCEDURE — 3078F PR MOST RECENT DIASTOLIC BLOOD PRESSURE < 80 MM HG: ICD-10-PCS | Mod: CPTII,S$GLB,, | Performed by: INTERNAL MEDICINE

## 2022-07-18 PROCEDURE — 3008F BODY MASS INDEX DOCD: CPT | Mod: CPTII,S$GLB,, | Performed by: INTERNAL MEDICINE

## 2022-07-18 PROCEDURE — 99999 PR PBB SHADOW E&M-EST. PATIENT-LVL III: CPT | Mod: PBBFAC,,, | Performed by: INTERNAL MEDICINE

## 2022-07-18 PROCEDURE — 1101F PT FALLS ASSESS-DOCD LE1/YR: CPT | Mod: CPTII,S$GLB,, | Performed by: INTERNAL MEDICINE

## 2022-07-18 PROCEDURE — 3074F PR MOST RECENT SYSTOLIC BLOOD PRESSURE < 130 MM HG: ICD-10-PCS | Mod: CPTII,S$GLB,, | Performed by: INTERNAL MEDICINE

## 2022-07-18 PROCEDURE — 4010F ACE/ARB THERAPY RXD/TAKEN: CPT | Mod: CPTII,S$GLB,, | Performed by: INTERNAL MEDICINE

## 2022-07-18 PROCEDURE — 3074F SYST BP LT 130 MM HG: CPT | Mod: CPTII,S$GLB,, | Performed by: INTERNAL MEDICINE

## 2022-07-18 PROCEDURE — 1101F PR PT FALLS ASSESS DOC 0-1 FALLS W/OUT INJ PAST YR: ICD-10-PCS | Mod: CPTII,S$GLB,, | Performed by: INTERNAL MEDICINE

## 2022-07-18 PROCEDURE — 3288F FALL RISK ASSESSMENT DOCD: CPT | Mod: CPTII,S$GLB,, | Performed by: INTERNAL MEDICINE

## 2022-07-18 PROCEDURE — 1159F PR MEDICATION LIST DOCUMENTED IN MEDICAL RECORD: ICD-10-PCS | Mod: CPTII,S$GLB,, | Performed by: INTERNAL MEDICINE

## 2022-07-18 PROCEDURE — 1160F RVW MEDS BY RX/DR IN RCRD: CPT | Mod: CPTII,S$GLB,, | Performed by: INTERNAL MEDICINE

## 2022-07-18 PROCEDURE — 3044F PR MOST RECENT HEMOGLOBIN A1C LEVEL <7.0%: ICD-10-PCS | Mod: CPTII,S$GLB,, | Performed by: INTERNAL MEDICINE

## 2022-07-18 PROCEDURE — 99215 OFFICE O/P EST HI 40 MIN: CPT | Mod: S$GLB,,, | Performed by: INTERNAL MEDICINE

## 2022-07-18 PROCEDURE — 3288F PR FALLS RISK ASSESSMENT DOCUMENTED: ICD-10-PCS | Mod: CPTII,S$GLB,, | Performed by: INTERNAL MEDICINE

## 2022-07-18 PROCEDURE — 4010F PR ACE/ARB THEARPY RXD/TAKEN: ICD-10-PCS | Mod: CPTII,S$GLB,, | Performed by: INTERNAL MEDICINE

## 2022-07-18 PROCEDURE — 1159F MED LIST DOCD IN RCRD: CPT | Mod: CPTII,S$GLB,, | Performed by: INTERNAL MEDICINE

## 2022-07-18 PROCEDURE — 3078F DIAST BP <80 MM HG: CPT | Mod: CPTII,S$GLB,, | Performed by: INTERNAL MEDICINE

## 2022-07-18 PROCEDURE — 99215 PR OFFICE/OUTPT VISIT, EST, LEVL V, 40-54 MIN: ICD-10-PCS | Mod: S$GLB,,, | Performed by: INTERNAL MEDICINE

## 2022-07-18 PROCEDURE — 1160F PR REVIEW ALL MEDS BY PRESCRIBER/CLIN PHARMACIST DOCUMENTED: ICD-10-PCS | Mod: CPTII,S$GLB,, | Performed by: INTERNAL MEDICINE

## 2022-07-18 PROCEDURE — 99999 PR PBB SHADOW E&M-EST. PATIENT-LVL III: ICD-10-PCS | Mod: PBBFAC,,, | Performed by: INTERNAL MEDICINE

## 2022-07-18 RX ORDER — ISOSORBIDE MONONITRATE 60 MG/1
60 TABLET, EXTENDED RELEASE ORAL 2 TIMES DAILY
Qty: 180 TABLET | Refills: 2 | Status: ON HOLD | OUTPATIENT
Start: 2022-07-18 | End: 2022-09-25 | Stop reason: HOSPADM

## 2022-07-18 RX ORDER — LISINOPRIL 10 MG/1
10 TABLET ORAL DAILY
Qty: 90 TABLET | Refills: 2 | Status: SHIPPED | OUTPATIENT
Start: 2022-07-18 | End: 2022-08-29

## 2022-07-18 RX ORDER — RANOLAZINE 1000 MG/1
1000 TABLET, EXTENDED RELEASE ORAL 2 TIMES DAILY
Qty: 60 TABLET | Refills: 11 | Status: SHIPPED | OUTPATIENT
Start: 2022-07-18 | End: 2023-06-01

## 2022-07-18 RX ORDER — ASPIRIN 81 MG/1
81 TABLET ORAL DAILY
Start: 2022-07-18 | End: 2023-10-17

## 2022-07-18 NOTE — PROGRESS NOTES
Subjective:   Patient ID:  Crow Green is a 67 y.o. male who presents for follow up of No chief complaint on file.      66 yo AAM, came in for routine f/u  PMH CAD nonobstructive and small vessel Dz per LHC done in 11/16, PAF s/p PVI in  by Dr. Brown, CHFpEF 45%, DM, hTN, HLD and obesity. Asthma and SANDRITA on CPAP f/u with Dr. Alicea.  -11/2016, Pt was admitted to [facility] for cough with little sputum for 6 weeks. Had severe left chest pain only with cough and sitting up from the bed. No pain with exercise. Denied palpitation, dizziness, orthopnea, PND and syncope. Troponin up to 0.218 and trending down. Echo showed EF 45% and MPI showed anteroseptal positive.   Subsequent LHC showed d2 30% lesion and otherwise demi Dz.   -03/2017, he was admitted to [facility] for acute cholelithiasis, s/p lap aidan. Pt had PAF during the admisssion and has been on amiodarone and Eliquis. BNP was up to 144 from 44 done five months ago.  -05/2018 at [facility], MPI showed SPECT images at rest and stress studies showed a moderate-sized, moderate anteroseptal perfusion defect and a large, severe inferior wall perfusion defect that are fixed. The gated stress study demonstrated a left ventricular ejection fraction of 35%, and ECHo showed EF 50%. Chest CTA showed no PE.  -2018, had urine drug presumptive cocaine positive but pt declined to use cocaine.   08/2019 afib with rvr during PFT, and admitted to [facility] due to weakness and dizziness.TROPONIN 0.07 AND FLAT. Converted to sinus after cardizem and BB. F/u with Dr. Brown on 08/15 and advised PVI if recurrent Afib.   echo showed EF 45%.  S/p Afib ablation in  by Dr. Brown  Lives with his dad     visit  05 and  twice admitted for chest pain. EKG no acute stt change. troponin 0.199 and flat.    ECHO EF 40%, MPI no ischemia  C/o cp 3 times a week. Lasted for seconds at rest. With SOB, dizziness.   Pt had quick chest pain for seconds when he was  sitting in the office today.     08/2021 visit  Admitted again in  for chest pain  Drug screen cocaine positive. 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 No headache in PM.   NO recurrent rx. BP low. Visiting nurse helped to prepare the medications for 2 weeks and pt is medication compliant     visit  On scooter for few months due to hip pain and legs weakness  Breathing rx per pulm service.  No leg swelling, chest pain. Dizziness in AM. Med compliance     visit  Left shoulder pain improved after shoulder procedure and rehab  imbalance after the toe removal.   No chest angina pain. No NTG SL prn since nov 2021  SOB controlled by inhaler and breathing rx  No leg swelling. No smoking med compliance  EKG NSR and old anterior infarct    Interval history  Progressively more frequent angina, needs twice taking NTG SL. Occurred at rest.  No dyspnea palpitation,   Some dizziness and headachce. LDL 88 and A1c 6.4   echo EF 45% and mod LVH        Past Medical History:   Diagnosis Date    Arthritis     Asthma     Atrial fibrillation     Atrial fibrillation with RVR 8/7/2019    CHF (congestive heart failure)     Chronic obstructive pulmonary disease 8/5/2020    Depression     Dermatomyositis     Diabetes mellitus, type 2 Diagnosed in 2000    Elevated PSA     Follow up 7/30/2020    Hypertension     Myocardial infarction     2017    Sleep apnea        Past Surgical History:   Procedure Laterality Date    ABLATION OF ARRHYTHMOGENIC FOCUS FOR ATRIAL FIBRILLATION N/A 2/27/2020    Procedure: Ablation atrial fibrillation;  Surgeon: Gio Brown MD;  Location: [facility] EP LAB;  Service: Cardiology;  Laterality: N/A;  afib, DAMIEN (cx if SR), PVI, PITO, anes, MB, 3 Prep    CHOLECYSTECTOMY      CLOSURE OF WOUND Left 7/1/2020    Procedure: CLOSURE, WOUND;  Surgeon: Barb Veras DPM;   Location: [facility] OR;  Service: Podiatry;  Laterality: Left;    COLONOSCOPY N/A 3/4/2022    Procedure: COLONOSCOPY;  Surgeon: Yolis Freitas MD;  Location: [facility] ENDO;  Service: Endoscopy;  Laterality: N/A;    ESOPHAGOGASTRODUODENOSCOPY N/A 3/4/2022    Procedure: EGD (ESOPHAGOGASTRODUODENOSCOPY);  Surgeon: Yolis Freitas MD;  Location: [facility] ENDO;  Service: Endoscopy;  Laterality: N/A;    INJECTION OF ANESTHETIC AGENT INTO SACROILIAC JOINT Left 3/24/2021    Procedure: Left BLOCK, SACROILIAC JOINT and bilateral rhomboid TPI;  Surgeon: Dillan Tsai MD;  Location: [facility] PAIN MGT;  Service: Pain Management;  Laterality: Left;    INTRALUMINAL GASTROINTESTINAL TRACT IMAGING VIA CAPSULE N/A 3/22/2022    Procedure: IMAGING PROCEDURE, GI TRACT, INTRALUMINAL, VIA CAPSULE;  Surgeon: Justice Martinez RN;  Location: [facility] ENDO;  Service: Endoscopy;  Laterality: N/A;    REVERSE TOTAL SHOULDER ARTHROPLASTY Left 1/10/2022    Procedure: ARTHROPLASTY, SHOULDER, TOTAL, REVERSE;  Surgeon: Anthony Alvarado MD;  Location: [facility];  Service: Orthopedics;  Laterality: Left;  left reverse total shoulder arthroplasty     SELECTIVE INJECTION OF ANESTHETIC AGENT AROUND LUMBAR SPINAL NERVE ROOT BY TRANSFORAMINAL APPROACH Left 6/11/2020    Procedure: BLOCK, SPINAL NERVE ROOT, LUMBAR, SELECTIVE, TRANSFORAMINAL APPROACH Left L4-5, L5-S1 TFESI with RN IV sedation;  Surgeon: Dillan Tsai MD;  Location: [facility] PAIN MGT;  Service: Pain Management;  Laterality: Left;    TOE AMPUTATION Left 6/29/2020    Procedure: AMPUTATION, TOE;  Surgeon: Barb Veras DPM;  Location: [facility];  Service: Podiatry;  Laterality: Left;  great toe       Social History     Tobacco Use    Smoking status: Never Smoker    Smokeless tobacco: Never Used   Substance Use Topics    Alcohol use: Yes     Comment: rare, maybe holidays    Drug use: Not Currently     Types: Cocaine       Family History   Problem Relation Age of Onset    Glaucoma Mother     Cataracts  Mother     Diabetes Mother     Cataracts Father     Stroke Father     Glaucoma Sister         x3    Cataracts Sister         x3    Diabetes Sister         x1    Stroke Sister         x1    Glaucoma Maternal Aunt     Diabetes Maternal Aunt     No Known Problems Brother     No Known Problems Daughter     No Known Problems Son     Cancer Maternal Grandfather         lung (smoker)    Prostate cancer Neg Hx     Heart disease Neg Hx     Kidney disease Neg Hx          Review of Systems   Constitutional: Negative for decreased appetite, diaphoresis, fever, malaise/fatigue and night sweats.   HENT: Negative for nosebleeds.    Eyes: Negative for blurred vision and double vision.   Cardiovascular: Positive for chest pain and dyspnea on exertion. Negative for claudication, irregular heartbeat, leg swelling, near-syncope, palpitations and syncope.   Respiratory: Negative for cough, shortness of breath, sleep disturbances due to breathing, snoring, sputum production and wheezing.    Endocrine: Negative for cold intolerance and polyuria.   Hematologic/Lymphatic: Does not bruise/bleed easily.   Skin: Negative for rash.   Musculoskeletal: Positive for arthritis, back pain and joint pain. Negative for falls, joint swelling and neck pain.   Gastrointestinal: Positive for nausea. Negative for abdominal pain, heartburn and vomiting.   Genitourinary: Negative for dysuria, frequency and hematuria.   Neurological: Positive for dizziness and headaches. Negative for difficulty with concentration, focal weakness, light-headedness, numbness, seizures and weakness.   Psychiatric/Behavioral: Negative for depression, memory loss and substance abuse. The patient does not have insomnia.    Allergic/Immunologic: Negative for HIV exposure and hives.       Objective:   Physical Exam  HENT:      Head: Normocephalic.   Eyes:      Pupils: Pupils are equal, round, and reactive to light.   Neck:      Thyroid: No thyromegaly.      Vascular:  Normal carotid pulses. No carotid bruit or JVD.   Cardiovascular:      Rate and Rhythm: Normal rate and regular rhythm.  No extrasystoles are present.     Chest Wall: PMI is not displaced.      Pulses: Normal pulses.      Heart sounds: Normal heart sounds. No murmur heard.    No gallop. No S3 sounds.   Pulmonary:      Effort: No respiratory distress.      Breath sounds: Normal breath sounds. No stridor.   Abdominal:      General: Bowel sounds are normal.      Palpations: Abdomen is soft.      Tenderness: There is no abdominal tenderness. There is no rebound.   Musculoskeletal:      Comments:      Skin:     Findings: No rash.   Neurological:      Mental Status: He is alert and oriented to person, place, and time.   Psychiatric:         Behavior: Behavior normal.         Lab Results   Component Value Date    CHOL 152 06/10/2022    CHOL 116 (L) 05/25/2021    CHOL 133 05/24/2021     Lab Results   Component Value Date    HDL 43 06/10/2022    HDL 31 (L) 05/25/2021    HDL 36 (L) 05/24/2021     Lab Results   Component Value Date    LDLCALC 88.8 06/10/2022    LDLCALC 62.6 (L) 05/25/2021    LDLCALC 77.0 05/24/2021     Lab Results   Component Value Date    TRIG 101 06/10/2022    TRIG 112 05/25/2021    TRIG 100 05/24/2021     Lab Results   Component Value Date    CHOLHDL 28.3 06/10/2022    CHOLHDL 26.7 05/25/2021    CHOLHDL 27.1 05/24/2021       Chemistry        Component Value Date/Time     06/10/2022 0926    K 4.6 06/10/2022 0926     06/10/2022 0926    CO2 24 06/10/2022 0926    BUN 11 06/10/2022 0926    CREATININE 1.1 06/10/2022 0926     (H) 06/10/2022 0926        Component Value Date/Time    CALCIUM 8.8 06/10/2022 0926    ALKPHOS 69 06/10/2022 0926    AST 27 06/10/2022 0926    ALT 22 06/10/2022 0926    BILITOT 0.2 06/10/2022 0926    ESTGFRAFRICA >60.0 06/10/2022 0926    EGFRNONAA >60.0 06/10/2022 0926          Lab Results   Component Value Date    HGBA1C 6.4 (H) 06/10/2022     Lab Results   Component Value  Date    TSH 1.529 07/27/2020     Lab Results   Component Value Date    INR 1.0 08/03/2021    INR 1.0 05/24/2021    INR 1.0 06/26/2020     Lab Results   Component Value Date    WBC 7.07 06/15/2022    HGB 12.4 (L) 06/15/2022    HCT 40.1 06/15/2022    MCV 85 06/15/2022     06/15/2022     BMP  Sodium   Date Value Ref Range Status   06/10/2022 140 136 - 145 mmol/L Final     Potassium   Date Value Ref Range Status   06/10/2022 4.6 3.5 - 5.1 mmol/L Final     Chloride   Date Value Ref Range Status   06/10/2022 107 95 - 110 mmol/L Final     CO2   Date Value Ref Range Status   06/10/2022 24 23 - 29 mmol/L Final     BUN   Date Value Ref Range Status   06/10/2022 11 8 - 23 mg/dL Final     Creatinine   Date Value Ref Range Status   06/10/2022 1.1 0.5 - 1.4 mg/dL Final     Calcium   Date Value Ref Range Status   06/10/2022 8.8 8.7 - 10.5 mg/dL Final     Anion Gap   Date Value Ref Range Status   06/10/2022 9 8 - 16 mmol/L Final     eGFR if    Date Value Ref Range Status   06/10/2022 >60.0 >60 mL/min/1.73 m^2 Final     eGFR if non    Date Value Ref Range Status   06/10/2022 >60.0 >60 mL/min/1.73 m^2 Final     Comment:     Calculation used to obtain the estimated glomerular filtration  rate (eGFR) is the CKD-EPI equation.        BNP  @LABRCNTIP(BNP,BNPTRIAGEBLO)@  @LABRCNTIP(troponini)@  CrCl cannot be calculated (Patient's most recent lab result is older than the maximum 7 days allowed.).  No results found in the last 24 hours.  No results found in the last 24 hours.  No results found in the last 24 hours.    Assessment:      1. Nonobstructive coronary artery disease of native artery of native heart    2. PAF (paroxysmal atrial fibrillation)    3. Chronic combined systolic and diastolic congestive heart failure    4. Hyperlipidemia associated with type 2 diabetes mellitus    5. Stage 3 chronic kidney disease, unspecified whether stage 3a or 3b CKD    6. 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  Agustin. Risk modification.   I have reviewed all pertinent labs and cardiac studies independently. Plans and recommendations have been formulated under my direct supervision. All questions answered and patient voiced understanding.   If symptoms persist go to the ED  RTC in 6 weeks

## 2022-07-27 ENCOUNTER — DOCUMENT SCAN (OUTPATIENT)
Dept: HOME HEALTH SERVICES | Facility: HOSPITAL | Age: 67
End: 2022-07-27
Payer: MEDICARE

## 2022-07-30 ENCOUNTER — HOSPITAL ENCOUNTER (INPATIENT)
Facility: HOSPITAL | Age: 67
LOS: 1 days | Discharge: HOME | DRG: 177 | End: 2022-08-03
Attending: FAMILY MEDICINE | Admitting: INTERNAL MEDICINE
Payer: MEDICARE

## 2022-07-30 DIAGNOSIS — Z79.4 TYPE 2 DIABETES MELLITUS WITH MILD NONPROLIFERATIVE RETINOPATHY AND MACULAR EDEMA, WITH LONG-TERM CURRENT USE OF INSULIN, UNSPECIFIED LATERALITY: ICD-10-CM

## 2022-07-30 DIAGNOSIS — I50.43 ACUTE ON CHRONIC COMBINED SYSTOLIC AND DIASTOLIC HEART FAILURE: ICD-10-CM

## 2022-07-30 DIAGNOSIS — I42.9 CARDIOMYOPATHY: ICD-10-CM

## 2022-07-30 DIAGNOSIS — J18.9 PNEUMONIA OF LEFT LOWER LOBE DUE TO INFECTIOUS ORGANISM: Primary | ICD-10-CM

## 2022-07-30 DIAGNOSIS — R09.02 HYPOXIA: ICD-10-CM

## 2022-07-30 DIAGNOSIS — I50.9 CHF (CONGESTIVE HEART FAILURE): ICD-10-CM

## 2022-07-30 DIAGNOSIS — R07.9 CHEST PAIN: ICD-10-CM

## 2022-07-30 DIAGNOSIS — J96.21 ACUTE ON CHRONIC RESPIRATORY FAILURE WITH HYPOXIA: ICD-10-CM

## 2022-07-30 DIAGNOSIS — I42.9 CARDIOMYOPATHY, UNSPECIFIED TYPE: ICD-10-CM

## 2022-07-30 DIAGNOSIS — E11.3219 TYPE 2 DIABETES MELLITUS WITH MILD NONPROLIFERATIVE RETINOPATHY AND MACULAR EDEMA, WITH LONG-TERM CURRENT USE OF INSULIN, UNSPECIFIED LATERALITY: ICD-10-CM

## 2022-07-30 DIAGNOSIS — I10 ESSENTIAL HYPERTENSION: ICD-10-CM

## 2022-07-30 DIAGNOSIS — I50.32 CHRONIC DIASTOLIC HEART FAILURE: ICD-10-CM

## 2022-07-30 DIAGNOSIS — R79.89 ELEVATED TROPONIN: ICD-10-CM

## 2022-07-30 DIAGNOSIS — I48.0 PAF (PAROXYSMAL ATRIAL FIBRILLATION): Chronic | ICD-10-CM

## 2022-07-30 LAB
ALBUMIN SERPL BCP-MCNC: 2.6 G/DL (ref 3.5–5.2)
ALP SERPL-CCNC: 73 U/L (ref 55–135)
ALT SERPL W/O P-5'-P-CCNC: 20 U/L (ref 10–44)
ANION GAP SERPL CALC-SCNC: 7 MMOL/L (ref 8–16)
AST SERPL-CCNC: 15 U/L (ref 10–40)
BACTERIA #/AREA URNS HPF: NORMAL /HPF
BASOPHILS # BLD AUTO: 0.01 K/UL (ref 0–0.2)
BASOPHILS NFR BLD: 0.1 % (ref 0–1.9)
BILIRUB SERPL-MCNC: 0.5 MG/DL (ref 0.1–1)
BILIRUB UR QL STRIP: NEGATIVE
BNP SERPL-MCNC: 517 PG/ML (ref 0–99)
BUN SERPL-MCNC: 15 MG/DL (ref 8–23)
CALCIUM SERPL-MCNC: 8.5 MG/DL (ref 8.7–10.5)
CHLORIDE SERPL-SCNC: 107 MMOL/L (ref 95–110)
CLARITY UR: CLEAR
CO2 SERPL-SCNC: 28 MMOL/L (ref 23–29)
COLOR UR: YELLOW
CREAT SERPL-MCNC: 1.1 MG/DL (ref 0.5–1.4)
DIFFERENTIAL METHOD: ABNORMAL
EOSINOPHIL # BLD AUTO: 0.1 K/UL (ref 0–0.5)
EOSINOPHIL NFR BLD: 0.6 % (ref 0–8)
ERYTHROCYTE [DISTWIDTH] IN BLOOD BY AUTOMATED COUNT: 17.9 % (ref 11.5–14.5)
EST. GFR  (AFRICAN AMERICAN): >60 ML/MIN/1.73 M^2
EST. GFR  (NON AFRICAN AMERICAN): >60 ML/MIN/1.73 M^2
GLUCOSE SERPL-MCNC: 143 MG/DL (ref 70–110)
GLUCOSE UR QL STRIP: ABNORMAL
HCT VFR BLD AUTO: 31.4 % (ref 40–54)
HGB BLD-MCNC: 10.1 G/DL (ref 14–18)
HGB UR QL STRIP: NEGATIVE
HYALINE CASTS #/AREA URNS LPF: 0 /LPF
IMM GRANULOCYTES # BLD AUTO: 0.03 K/UL (ref 0–0.04)
IMM GRANULOCYTES NFR BLD AUTO: 0.3 % (ref 0–0.5)
KETONES UR QL STRIP: NEGATIVE
LEUKOCYTE ESTERASE UR QL STRIP: NEGATIVE
LYMPHOCYTES # BLD AUTO: 1.6 K/UL (ref 1–4.8)
LYMPHOCYTES NFR BLD: 17.3 % (ref 18–48)
MCH RBC QN AUTO: 27.2 PG (ref 27–31)
MCHC RBC AUTO-ENTMCNC: 32.2 G/DL (ref 32–36)
MCV RBC AUTO: 84 FL (ref 82–98)
MICROSCOPIC COMMENT: NORMAL
MONOCYTES # BLD AUTO: 0.8 K/UL (ref 0.3–1)
MONOCYTES NFR BLD: 8.8 % (ref 4–15)
NEUTROPHILS # BLD AUTO: 6.6 K/UL (ref 1.8–7.7)
NEUTROPHILS NFR BLD: 72.9 % (ref 38–73)
NITRITE UR QL STRIP: NEGATIVE
NRBC BLD-RTO: 0 /100 WBC
PH UR STRIP: 7 [PH] (ref 5–8)
PLATELET # BLD AUTO: 159 K/UL (ref 150–450)
PMV BLD AUTO: 9.3 FL (ref 9.2–12.9)
POTASSIUM SERPL-SCNC: 3.7 MMOL/L (ref 3.5–5.1)
PROT SERPL-MCNC: 6.4 G/DL (ref 6–8.4)
PROT UR QL STRIP: ABNORMAL
RBC # BLD AUTO: 3.72 M/UL (ref 4.6–6.2)
RBC #/AREA URNS HPF: 0 /HPF (ref 0–4)
SODIUM SERPL-SCNC: 142 MMOL/L (ref 136–145)
SP GR UR STRIP: 1.02 (ref 1–1.03)
TROPONIN I SERPL DL<=0.01 NG/ML-MCNC: 0.15 NG/ML (ref 0–0.03)
URN SPEC COLLECT METH UR: ABNORMAL
UROBILINOGEN UR STRIP-ACNC: NEGATIVE EU/DL
WBC # BLD AUTO: 9.04 K/UL (ref 3.9–12.7)
WBC #/AREA URNS HPF: 0 /HPF (ref 0–5)
YEAST URNS QL MICRO: NORMAL

## 2022-07-30 PROCEDURE — 85025 COMPLETE CBC W/AUTO DIFF WBC: CPT | Performed by: FAMILY MEDICINE

## 2022-07-30 PROCEDURE — U0002 COVID-19 LAB TEST NON-CDC: HCPCS | Performed by: FAMILY MEDICINE

## 2022-07-30 PROCEDURE — 99291 CRITICAL CARE FIRST HOUR: CPT | Mod: 25

## 2022-07-30 PROCEDURE — 80053 COMPREHEN METABOLIC PANEL: CPT | Performed by: FAMILY MEDICINE

## 2022-07-30 PROCEDURE — 83880 ASSAY OF NATRIURETIC PEPTIDE: CPT | Performed by: FAMILY MEDICINE

## 2022-07-30 PROCEDURE — 84484 ASSAY OF TROPONIN QUANT: CPT | Performed by: FAMILY MEDICINE

## 2022-07-30 PROCEDURE — 81000 URINALYSIS NONAUTO W/SCOPE: CPT | Performed by: FAMILY MEDICINE

## 2022-07-30 RX ORDER — AZITHROMYCIN 250 MG/1
250 TABLET, FILM COATED ORAL DAILY
Qty: 6 TABLET | Refills: 0 | Status: SHIPPED | OUTPATIENT
Start: 2022-07-30 | End: 2022-08-03 | Stop reason: HOSPADM

## 2022-07-31 PROBLEM — J96.21 ACUTE ON CHRONIC RESPIRATORY FAILURE WITH HYPOXIA: Status: ACTIVE | Noted: 2022-07-31

## 2022-07-31 PROBLEM — J18.9 PNEUMONIA: Status: ACTIVE | Noted: 2022-07-31

## 2022-07-31 LAB
ALBUMIN SERPL BCP-MCNC: 2.6 G/DL (ref 3.5–5.2)
ALP SERPL-CCNC: 68 U/L (ref 55–135)
ALT SERPL W/O P-5'-P-CCNC: 18 U/L (ref 10–44)
ANION GAP SERPL CALC-SCNC: 8 MMOL/L (ref 8–16)
AORTIC ROOT ANNULUS: 2.9 CM
ASCENDING AORTA: 3.13 CM
AST SERPL-CCNC: 13 U/L (ref 10–40)
AV INDEX (PROSTH): 0.45
AV MEAN GRADIENT: 9 MMHG
AV PEAK GRADIENT: 16 MMHG
AV VALVE AREA: 1.66 CM2
AV VELOCITY RATIO: 0.46
BILIRUB SERPL-MCNC: 0.5 MG/DL (ref 0.1–1)
BSA FOR ECHO PROCEDURE: 2.03 M2
BUN SERPL-MCNC: 19 MG/DL (ref 8–23)
CALCIUM SERPL-MCNC: 8 MG/DL (ref 8.7–10.5)
CHLORIDE SERPL-SCNC: 106 MMOL/L (ref 95–110)
CO2 SERPL-SCNC: 28 MMOL/L (ref 23–29)
CREAT SERPL-MCNC: 1.1 MG/DL (ref 0.5–1.4)
CTP QC/QA: YES
CV ECHO LV RWT: 0.95 CM
DOP CALC AO PEAK VEL: 2 M/S
DOP CALC AO VTI: 41.3 CM
DOP CALC LVOT AREA: 3.7 CM2
DOP CALC LVOT DIAMETER: 2.17 CM
DOP CALC LVOT PEAK VEL: 0.91 M/S
DOP CALC LVOT STROKE VOLUME: 68.75 CM3
DOP CALC RVOT PEAK VEL: 0.52 M/S
DOP CALC RVOT VTI: 9.6 CM
DOP CALCLVOT PEAK VEL VTI: 18.6 CM
E WAVE DECELERATION TIME: 198.11 MSEC
E/A RATIO: 2.89
E/E' RATIO: 28.89 M/S
ECHO LV POSTERIOR WALL: 1.93 CM (ref 0.6–1.1)
EJECTION FRACTION: 40 %
EST. GFR  (AFRICAN AMERICAN): >60 ML/MIN/1.73 M^2
EST. GFR  (NON AFRICAN AMERICAN): >60 ML/MIN/1.73 M^2
FRACTIONAL SHORTENING: 22 % (ref 28–44)
GLUCOSE SERPL-MCNC: 152 MG/DL (ref 70–110)
INTERVENTRICULAR SEPTUM: 2.06 CM (ref 0.6–1.1)
IVC DIAMETER: 2.26 CM
IVRT: 59.94 MSEC
LA MAJOR: 5.54 CM
LA MINOR: 5.57 CM
LA WIDTH: 3.6 CM
LEFT ATRIUM SIZE: 4.71 CM
LEFT ATRIUM VOLUME INDEX: 40.6 ML/M2
LEFT ATRIUM VOLUME: 80.06 CM3
LEFT INTERNAL DIMENSION IN SYSTOLE: 3.2 CM (ref 2.1–4)
LEFT VENTRICLE DIASTOLIC VOLUME INDEX: 37.15 ML/M2
LEFT VENTRICLE DIASTOLIC VOLUME: 73.19 ML
LEFT VENTRICLE MASS INDEX: 194 G/M2
LEFT VENTRICLE SYSTOLIC VOLUME INDEX: 20.8 ML/M2
LEFT VENTRICLE SYSTOLIC VOLUME: 40.99 ML
LEFT VENTRICULAR INTERNAL DIMENSION IN DIASTOLE: 4.08 CM (ref 3.5–6)
LEFT VENTRICULAR MASS: 381.36 G
LV LATERAL E/E' RATIO: 26 M/S
LV SEPTAL E/E' RATIO: 32.5 M/S
LVOT MG: 2 MMHG
LVOT MV: 0.67 CM/S
MAGNESIUM SERPL-MCNC: 2 MG/DL (ref 1.6–2.6)
MV PEAK A VEL: 0.45 M/S
MV PEAK E VEL: 1.3 M/S
MV STENOSIS PRESSURE HALF TIME: 57.45 MS
MV VALVE AREA P 1/2 METHOD: 3.83 CM2
PISA MRMAX VEL: 4.25 M/S
PISA TR MAX VEL: 2.76 M/S
POCT GLUCOSE: 147 MG/DL (ref 70–110)
POCT GLUCOSE: 184 MG/DL (ref 70–110)
POCT GLUCOSE: 238 MG/DL (ref 70–110)
POTASSIUM SERPL-SCNC: 3.8 MMOL/L (ref 3.5–5.1)
PROCALCITONIN SERPL IA-MCNC: 0.06 NG/ML
PROT SERPL-MCNC: 5.9 G/DL (ref 6–8.4)
PV MEAN GRADIENT: 0.64 MMHG
RA MAJOR: 4.41 CM
RA PRESSURE: 15 MMHG
SARS-COV-2 RDRP RESP QL NAA+PROBE: NEGATIVE
SODIUM SERPL-SCNC: 142 MMOL/L (ref 136–145)
STJ: 2.86 CM
TDI LATERAL: 0.05 M/S
TDI SEPTAL: 0.04 M/S
TDI: 0.05 M/S
TR MAX PG: 30 MMHG
TROPONIN I SERPL DL<=0.01 NG/ML-MCNC: 0.14 NG/ML (ref 0–0.03)
TROPONIN I SERPL DL<=0.01 NG/ML-MCNC: 0.15 NG/ML (ref 0–0.03)
TV REST PULMONARY ARTERY PRESSURE: 45 MMHG

## 2022-07-31 PROCEDURE — 94640 AIRWAY INHALATION TREATMENT: CPT | Mod: XB

## 2022-07-31 PROCEDURE — 87186 SC STD MICRODIL/AGAR DIL: CPT | Performed by: NURSE PRACTITIONER

## 2022-07-31 PROCEDURE — 25000003 PHARM REV CODE 250: Performed by: FAMILY MEDICINE

## 2022-07-31 PROCEDURE — G0378 HOSPITAL OBSERVATION PER HR: HCPCS

## 2022-07-31 PROCEDURE — 27000221 HC OXYGEN, UP TO 24 HOURS

## 2022-07-31 PROCEDURE — 94660 CPAP INITIATION&MGMT: CPT

## 2022-07-31 PROCEDURE — 87040 BLOOD CULTURE FOR BACTERIA: CPT | Performed by: NURSE PRACTITIONER

## 2022-07-31 PROCEDURE — 99900035 HC TECH TIME PER 15 MIN (STAT)

## 2022-07-31 PROCEDURE — 99215 OFFICE O/P EST HI 40 MIN: CPT | Mod: ,,, | Performed by: INTERNAL MEDICINE

## 2022-07-31 PROCEDURE — 87205 SMEAR GRAM STAIN: CPT | Performed by: NURSE PRACTITIONER

## 2022-07-31 PROCEDURE — 96375 TX/PRO/DX INJ NEW DRUG ADDON: CPT

## 2022-07-31 PROCEDURE — 36415 COLL VENOUS BLD VENIPUNCTURE: CPT | Performed by: NURSE PRACTITIONER

## 2022-07-31 PROCEDURE — 96376 TX/PRO/DX INJ SAME DRUG ADON: CPT

## 2022-07-31 PROCEDURE — 80053 COMPREHEN METABOLIC PANEL: CPT | Performed by: NURSE PRACTITIONER

## 2022-07-31 PROCEDURE — 87070 CULTURE OTHR SPECIMN AEROBIC: CPT | Performed by: NURSE PRACTITIONER

## 2022-07-31 PROCEDURE — 94761 N-INVAS EAR/PLS OXIMETRY MLT: CPT

## 2022-07-31 PROCEDURE — 63600175 PHARM REV CODE 636 W HCPCS: Performed by: NURSE PRACTITIONER

## 2022-07-31 PROCEDURE — 25000242 PHARM REV CODE 250 ALT 637 W/ HCPCS: Performed by: INTERNAL MEDICINE

## 2022-07-31 PROCEDURE — 25000003 PHARM REV CODE 250: Performed by: NURSE PRACTITIONER

## 2022-07-31 PROCEDURE — 87077 CULTURE AEROBIC IDENTIFY: CPT | Performed by: NURSE PRACTITIONER

## 2022-07-31 PROCEDURE — 84484 ASSAY OF TROPONIN QUANT: CPT | Performed by: NURSE PRACTITIONER

## 2022-07-31 PROCEDURE — 99215 PR OFFICE/OUTPT VISIT, EST, LEVL V, 40-54 MIN: ICD-10-PCS | Mod: ,,, | Performed by: INTERNAL MEDICINE

## 2022-07-31 PROCEDURE — 84145 PROCALCITONIN (PCT): CPT | Performed by: NURSE PRACTITIONER

## 2022-07-31 PROCEDURE — 96365 THER/PROPH/DIAG IV INF INIT: CPT

## 2022-07-31 PROCEDURE — 63600175 PHARM REV CODE 636 W HCPCS: Performed by: FAMILY MEDICINE

## 2022-07-31 PROCEDURE — 96367 TX/PROPH/DG ADDL SEQ IV INF: CPT

## 2022-07-31 PROCEDURE — 83735 ASSAY OF MAGNESIUM: CPT | Performed by: NURSE PRACTITIONER

## 2022-07-31 PROCEDURE — 83036 HEMOGLOBIN GLYCOSYLATED A1C: CPT | Performed by: NURSE PRACTITIONER

## 2022-07-31 PROCEDURE — 27000190 HC CPAP FULL FACE MASK W/VALVE

## 2022-07-31 PROCEDURE — 96372 THER/PROPH/DIAG INJ SC/IM: CPT | Performed by: NURSE PRACTITIONER

## 2022-07-31 RX ORDER — HYDROCODONE BITARTRATE AND ACETAMINOPHEN 5; 325 MG/1; MG/1
1 TABLET ORAL EVERY 6 HOURS PRN
Status: DISCONTINUED | OUTPATIENT
Start: 2022-07-31 | End: 2022-08-03 | Stop reason: HOSPADM

## 2022-07-31 RX ORDER — INSULIN ASPART 100 [IU]/ML
0-5 INJECTION, SOLUTION INTRAVENOUS; SUBCUTANEOUS
Status: DISCONTINUED | OUTPATIENT
Start: 2022-07-31 | End: 2022-08-02

## 2022-07-31 RX ORDER — SODIUM CHLORIDE 0.9 % (FLUSH) 0.9 %
10 SYRINGE (ML) INJECTION
Status: DISCONTINUED | OUTPATIENT
Start: 2022-07-31 | End: 2022-08-03 | Stop reason: HOSPADM

## 2022-07-31 RX ORDER — TRAZODONE HYDROCHLORIDE 100 MG/1
200 TABLET ORAL NIGHTLY
Status: DISCONTINUED | OUTPATIENT
Start: 2022-07-31 | End: 2022-08-03 | Stop reason: HOSPADM

## 2022-07-31 RX ORDER — GUAIFENESIN/DEXTROMETHORPHAN 100-10MG/5
5 SYRUP ORAL EVERY 6 HOURS PRN
Status: DISCONTINUED | OUTPATIENT
Start: 2022-07-31 | End: 2022-08-03 | Stop reason: HOSPADM

## 2022-07-31 RX ORDER — ISOSORBIDE MONONITRATE 60 MG/1
60 TABLET, EXTENDED RELEASE ORAL 2 TIMES DAILY
Status: DISCONTINUED | OUTPATIENT
Start: 2022-07-31 | End: 2022-08-03 | Stop reason: HOSPADM

## 2022-07-31 RX ORDER — ALBUTEROL SULFATE 0.83 MG/ML
2.5 SOLUTION RESPIRATORY (INHALATION) EVERY 4 HOURS
Refills: 11 | Status: DISCONTINUED | OUTPATIENT
Start: 2022-07-31 | End: 2022-07-31

## 2022-07-31 RX ORDER — PANTOPRAZOLE SODIUM 40 MG/1
40 TABLET, DELAYED RELEASE ORAL DAILY
Status: DISCONTINUED | OUTPATIENT
Start: 2022-07-31 | End: 2022-08-03 | Stop reason: HOSPADM

## 2022-07-31 RX ORDER — GABAPENTIN 400 MG/1
800 CAPSULE ORAL 3 TIMES DAILY
Status: DISCONTINUED | OUTPATIENT
Start: 2022-07-31 | End: 2022-08-03 | Stop reason: HOSPADM

## 2022-07-31 RX ORDER — IBUPROFEN 200 MG
16 TABLET ORAL
Status: DISCONTINUED | OUTPATIENT
Start: 2022-07-31 | End: 2022-08-03 | Stop reason: HOSPADM

## 2022-07-31 RX ORDER — ONDANSETRON 2 MG/ML
4 INJECTION INTRAMUSCULAR; INTRAVENOUS EVERY 8 HOURS PRN
Status: DISCONTINUED | OUTPATIENT
Start: 2022-07-31 | End: 2022-08-03 | Stop reason: HOSPADM

## 2022-07-31 RX ORDER — ASPIRIN 81 MG/1
81 TABLET ORAL DAILY
Status: DISCONTINUED | OUTPATIENT
Start: 2022-07-31 | End: 2022-08-03 | Stop reason: HOSPADM

## 2022-07-31 RX ORDER — ATORVASTATIN CALCIUM 40 MG/1
40 TABLET, FILM COATED ORAL NIGHTLY
Status: DISCONTINUED | OUTPATIENT
Start: 2022-07-31 | End: 2022-08-03 | Stop reason: HOSPADM

## 2022-07-31 RX ORDER — FLUTICASONE PROPIONATE AND SALMETEROL 250; 50 UG/1; UG/1
1 POWDER RESPIRATORY (INHALATION) 2 TIMES DAILY
Status: DISCONTINUED | OUTPATIENT
Start: 2022-07-31 | End: 2022-07-31

## 2022-07-31 RX ORDER — FUROSEMIDE 10 MG/ML
40 INJECTION INTRAMUSCULAR; INTRAVENOUS DAILY
Status: DISCONTINUED | OUTPATIENT
Start: 2022-07-31 | End: 2022-07-31

## 2022-07-31 RX ORDER — ARFORMOTEROL TARTRATE 15 UG/2ML
15 SOLUTION RESPIRATORY (INHALATION) 2 TIMES DAILY
Status: DISCONTINUED | OUTPATIENT
Start: 2022-07-31 | End: 2022-08-03 | Stop reason: HOSPADM

## 2022-07-31 RX ORDER — IPRATROPIUM BROMIDE AND ALBUTEROL SULFATE 2.5; .5 MG/3ML; MG/3ML
3 SOLUTION RESPIRATORY (INHALATION) EVERY 6 HOURS
Status: DISCONTINUED | OUTPATIENT
Start: 2022-07-31 | End: 2022-08-03 | Stop reason: HOSPADM

## 2022-07-31 RX ORDER — HYDROCODONE BITARTRATE AND ACETAMINOPHEN 10; 325 MG/1; MG/1
1 TABLET ORAL EVERY 6 HOURS PRN
Status: DISCONTINUED | OUTPATIENT
Start: 2022-07-31 | End: 2022-08-03 | Stop reason: HOSPADM

## 2022-07-31 RX ORDER — BUDESONIDE 0.5 MG/2ML
0.5 INHALANT ORAL EVERY 12 HOURS
Status: DISCONTINUED | OUTPATIENT
Start: 2022-07-31 | End: 2022-08-03 | Stop reason: HOSPADM

## 2022-07-31 RX ORDER — TAMSULOSIN HYDROCHLORIDE 0.4 MG/1
0.4 CAPSULE ORAL DAILY
Status: DISCONTINUED | OUTPATIENT
Start: 2022-07-31 | End: 2022-08-03 | Stop reason: HOSPADM

## 2022-07-31 RX ORDER — HYDROCODONE BITARTRATE AND ACETAMINOPHEN 10; 325 MG/1; MG/1
1 TABLET ORAL
Status: COMPLETED | OUTPATIENT
Start: 2022-07-31 | End: 2022-07-31

## 2022-07-31 RX ORDER — GLUCAGON 1 MG
1 KIT INJECTION
Status: DISCONTINUED | OUTPATIENT
Start: 2022-07-31 | End: 2022-08-03 | Stop reason: HOSPADM

## 2022-07-31 RX ORDER — FUROSEMIDE 10 MG/ML
40 INJECTION INTRAMUSCULAR; INTRAVENOUS
Status: DISCONTINUED | OUTPATIENT
Start: 2022-07-31 | End: 2022-07-31

## 2022-07-31 RX ORDER — IBUPROFEN 200 MG
24 TABLET ORAL
Status: DISCONTINUED | OUTPATIENT
Start: 2022-07-31 | End: 2022-08-03 | Stop reason: HOSPADM

## 2022-07-31 RX ORDER — RANOLAZINE 500 MG/1
1000 TABLET, EXTENDED RELEASE ORAL 2 TIMES DAILY
Status: DISCONTINUED | OUTPATIENT
Start: 2022-07-31 | End: 2022-08-03 | Stop reason: HOSPADM

## 2022-07-31 RX ORDER — SODIUM CHLORIDE 0.9 % (FLUSH) 0.9 %
10 SYRINGE (ML) INJECTION EVERY 12 HOURS PRN
Status: DISCONTINUED | OUTPATIENT
Start: 2022-07-31 | End: 2022-08-03 | Stop reason: HOSPADM

## 2022-07-31 RX ORDER — IPRATROPIUM BROMIDE 0.5 MG/2.5ML
0.5 SOLUTION RESPIRATORY (INHALATION) EVERY 6 HOURS
Status: DISCONTINUED | OUTPATIENT
Start: 2022-07-31 | End: 2022-07-31

## 2022-07-31 RX ORDER — METOPROLOL SUCCINATE 50 MG/1
50 TABLET, EXTENDED RELEASE ORAL NIGHTLY
Status: DISCONTINUED | OUTPATIENT
Start: 2022-07-31 | End: 2022-08-03 | Stop reason: HOSPADM

## 2022-07-31 RX ORDER — SERTRALINE HYDROCHLORIDE 50 MG/1
100 TABLET, FILM COATED ORAL NIGHTLY
Status: DISCONTINUED | OUTPATIENT
Start: 2022-07-31 | End: 2022-08-03 | Stop reason: HOSPADM

## 2022-07-31 RX ORDER — NALOXONE HCL 0.4 MG/ML
0.02 VIAL (ML) INJECTION
Status: DISCONTINUED | OUTPATIENT
Start: 2022-07-31 | End: 2022-08-03 | Stop reason: HOSPADM

## 2022-07-31 RX ORDER — ACETAMINOPHEN 500 MG
1000 TABLET ORAL EVERY 8 HOURS PRN
Status: DISCONTINUED | OUTPATIENT
Start: 2022-07-31 | End: 2022-08-03 | Stop reason: HOSPADM

## 2022-07-31 RX ORDER — LISINOPRIL 10 MG/1
10 TABLET ORAL DAILY
Status: DISCONTINUED | OUTPATIENT
Start: 2022-07-31 | End: 2022-08-03 | Stop reason: HOSPADM

## 2022-07-31 RX ORDER — FUROSEMIDE 10 MG/ML
40 INJECTION INTRAMUSCULAR; INTRAVENOUS
Status: DISCONTINUED | OUTPATIENT
Start: 2022-07-31 | End: 2022-08-03

## 2022-07-31 RX ADMIN — ASPIRIN 81 MG: 81 TABLET, COATED ORAL at 09:07

## 2022-07-31 RX ADMIN — ISOSORBIDE MONONITRATE 60 MG: 60 TABLET, EXTENDED RELEASE ORAL at 09:07

## 2022-07-31 RX ADMIN — HYDROCODONE BITARTRATE AND ACETAMINOPHEN 1 TABLET: 10; 325 TABLET ORAL at 11:07

## 2022-07-31 RX ADMIN — IPRATROPIUM BROMIDE AND ALBUTEROL SULFATE 3 ML: 2.5; .5 SOLUTION RESPIRATORY (INHALATION) at 07:07

## 2022-07-31 RX ADMIN — TRAZODONE HYDROCHLORIDE 200 MG: 100 TABLET ORAL at 08:07

## 2022-07-31 RX ADMIN — ARFORMOTEROL TARTRATE 15 MCG: 15 SOLUTION RESPIRATORY (INHALATION) at 07:07

## 2022-07-31 RX ADMIN — GABAPENTIN 800 MG: 400 CAPSULE ORAL at 08:07

## 2022-07-31 RX ADMIN — BUDESONIDE 0.5 MG: 0.5 INHALANT ORAL at 07:07

## 2022-07-31 RX ADMIN — PANTOPRAZOLE SODIUM 40 MG: 40 TABLET, DELAYED RELEASE ORAL at 09:07

## 2022-07-31 RX ADMIN — SERTRALINE HYDROCHLORIDE 100 MG: 50 TABLET ORAL at 08:07

## 2022-07-31 RX ADMIN — HYDROCODONE BITARTRATE AND ACETAMINOPHEN 1 TABLET: 10; 325 TABLET ORAL at 01:07

## 2022-07-31 RX ADMIN — AZITHROMYCIN MONOHYDRATE 500 MG: 500 INJECTION, POWDER, LYOPHILIZED, FOR SOLUTION INTRAVENOUS at 04:07

## 2022-07-31 RX ADMIN — TAMSULOSIN HYDROCHLORIDE 0.4 MG: 0.4 CAPSULE ORAL at 09:07

## 2022-07-31 RX ADMIN — SERTRALINE HYDROCHLORIDE 100 MG: 50 TABLET ORAL at 04:07

## 2022-07-31 RX ADMIN — FUROSEMIDE 40 MG: 10 INJECTION, SOLUTION INTRAMUSCULAR; INTRAVENOUS at 09:07

## 2022-07-31 RX ADMIN — RANOLAZINE 1000 MG: 500 TABLET, EXTENDED RELEASE ORAL at 09:07

## 2022-07-31 RX ADMIN — GABAPENTIN 800 MG: 400 CAPSULE ORAL at 03:07

## 2022-07-31 RX ADMIN — APIXABAN 5 MG: 2.5 TABLET, FILM COATED ORAL at 09:07

## 2022-07-31 RX ADMIN — ISOSORBIDE MONONITRATE 60 MG: 60 TABLET, EXTENDED RELEASE ORAL at 08:07

## 2022-07-31 RX ADMIN — GUAIFENESIN AND DEXTROMETHORPHAN 5 ML: 100; 10 SYRUP ORAL at 05:07

## 2022-07-31 RX ADMIN — FUROSEMIDE 40 MG: 10 INJECTION, SOLUTION INTRAMUSCULAR; INTRAVENOUS at 11:07

## 2022-07-31 RX ADMIN — METOPROLOL SUCCINATE 50 MG: 50 TABLET, EXTENDED RELEASE ORAL at 04:07

## 2022-07-31 RX ADMIN — METOPROLOL SUCCINATE 50 MG: 50 TABLET, EXTENDED RELEASE ORAL at 08:07

## 2022-07-31 RX ADMIN — INSULIN ASPART 2 UNITS: 100 INJECTION, SOLUTION INTRAVENOUS; SUBCUTANEOUS at 11:07

## 2022-07-31 RX ADMIN — RANOLAZINE 1000 MG: 500 TABLET, EXTENDED RELEASE ORAL at 08:07

## 2022-07-31 RX ADMIN — LISINOPRIL 10 MG: 10 TABLET ORAL at 09:07

## 2022-07-31 RX ADMIN — ATORVASTATIN CALCIUM 40 MG: 40 TABLET, FILM COATED ORAL at 04:07

## 2022-07-31 RX ADMIN — GABAPENTIN 800 MG: 400 CAPSULE ORAL at 09:07

## 2022-07-31 RX ADMIN — ATORVASTATIN CALCIUM 40 MG: 40 TABLET, FILM COATED ORAL at 08:07

## 2022-07-31 RX ADMIN — TRAZODONE HYDROCHLORIDE 200 MG: 100 TABLET ORAL at 04:07

## 2022-07-31 RX ADMIN — IPRATROPIUM BROMIDE AND ALBUTEROL SULFATE 3 ML: 2.5; .5 SOLUTION RESPIRATORY (INHALATION) at 01:07

## 2022-07-31 RX ADMIN — APIXABAN 5 MG: 2.5 TABLET, FILM COATED ORAL at 08:07

## 2022-07-31 RX ADMIN — HYDROCODONE BITARTRATE AND ACETAMINOPHEN 1 TABLET: 10; 325 TABLET ORAL at 08:07

## 2022-07-31 RX ADMIN — CEFTRIAXONE 1 G: 1 INJECTION, SOLUTION INTRAVENOUS at 12:07

## 2022-07-31 NOTE — ED PROVIDER NOTES
SCRIBE #1 NOTE: I, Mohsen Monzon, am scribing for, and in the presence of, Linda Rand MD. I have scribed the entire note.       History     Chief Complaint   Patient presents with    Chest Pain     Pt. Presents to ED via [facility] due to having mid-sternal chest pain since yesterday, along with a cough, and feeling weak also since yesterday.      Review of patient's allergies indicates:   Allergen Reactions    Protamine Hives     Urticaria, possible upper airway swelling         History of Present Illness     HPI    7/30/2022, 10:16 PM  History obtained from the patient      History of Present Illness: Crow Green is a 67 y.o. male patient with a PMHx of asthma, atrial fibrillation with RVR, CHF, chronic obstructive pulmonary disease, DM, elevated PSA, HTN, MI who presents to the Emergency Department for evaluation of mid-sternal chest pain which onset gradually yesterday. Pt also c/o a cough and feeling weak. Symptoms are constant and moderate in severity. No mitigating or exacerbating factors reported. Associated sxs include weakness, cough. Patient denies any n/v/d, fever, chills, and all other sxs at this time. No prior Tx reported. No further complaints or concerns at this time.       Arrival mode: Personal vehicle    PCP: Eunice Mora NP        Past Medical History:  Past Medical History:   Diagnosis Date    Arthritis     Asthma     Atrial fibrillation     Atrial fibrillation with RVR 8/7/2019    CHF (congestive heart failure)     Chronic obstructive pulmonary disease 8/5/2020    Depression     Dermatomyositis     Diabetes mellitus, type 2 Diagnosed in 2000    Elevated PSA     Follow up 7/30/2020    Hypertension     Myocardial infarction     2017    Sleep apnea        Past Surgical History:  Past Surgical History:   Procedure Laterality Date    ABLATION OF ARRHYTHMOGENIC FOCUS FOR ATRIAL FIBRILLATION N/A 2/27/2020    Procedure: Ablation atrial fibrillation;  Surgeon:  Gio Brown MD;  Location: [facility] EP [facility];  Service: Cardiology;  Laterality: N/A;  afib, DAMIEN (cx if SR), PVI, PITO, anes, MB, 3 Prep    CHOLECYSTECTOMY      CLOSURE OF WOUND Left 7/1/2020    Procedure: CLOSURE, WOUND;  Surgeon: Barb Veras DPM;  Location: [facility];  Service: Podiatry;  Laterality: Left;    COLONOSCOPY N/A 3/4/2022    Procedure: COLONOSCOPY;  Surgeon: Yolis Freitas MD;  Location: [facility];  Service: Endoscopy;  Laterality: N/A;    ESOPHAGOGASTRODUODENOSCOPY N/A 3/4/2022    Procedure: EGD (ESOPHAGOGASTRODUODENOSCOPY);  Surgeon: Yolis Freitas MD;  Location: [facility];  Service: Endoscopy;  Laterality: N/A;    INJECTION OF ANESTHETIC AGENT INTO SACROILIAC JOINT Left 3/24/2021    Procedure: Left BLOCK, SACROILIAC JOINT and bilateral rhomboid TPI;  Surgeon: Dillan Tsai MD;  Location: [facility];  Service: Pain Management;  Laterality: Left;    INTRALUMINAL GASTROINTESTINAL TRACT IMAGING VIA CAPSULE N/A 3/22/2022    Procedure: IMAGING PROCEDURE, GI TRACT, INTRALUMINAL, VIA CAPSULE;  Surgeon: Justice Martinez RN;  Location: [facility];  Service: Endoscopy;  Laterality: N/A;    REVERSE TOTAL SHOULDER ARTHROPLASTY Left 1/10/2022    Procedure: ARTHROPLASTY, SHOULDER, TOTAL, REVERSE;  Surgeon: Anthony Alavrado MD;  Location: [facility];  Service: Orthopedics;  Laterality: Left;  left reverse total shoulder arthroplasty     SELECTIVE INJECTION OF ANESTHETIC AGENT AROUND LUMBAR SPINAL NERVE ROOT BY TRANSFORAMINAL APPROACH Left 6/11/2020    Procedure: BLOCK, SPINAL NERVE ROOT, LUMBAR, SELECTIVE, TRANSFORAMINAL APPROACH Left L4-5, L5-S1 TFESI with RN IV sedation;  Surgeon: Dillan Tsai MD;  Location: [facility];  Service: Pain Management;  Laterality: Left;    TOE AMPUTATION Left 6/29/2020    Procedure: AMPUTATION, TOE;  Surgeon: Barb Veras DPM;  Location: [facility];  Service: Podiatry;  Laterality: Left;  great toe         Family History:  Family History   Problem Relation  Age of Onset    Glaucoma Mother     Cataracts Mother     Diabetes Mother     Cataracts Father     Stroke Father     Glaucoma Sister         x3    Cataracts Sister         x3    Diabetes Sister         x1    Stroke Sister         x1    Glaucoma Maternal Aunt     Diabetes Maternal Aunt     No Known Problems Brother     No Known Problems Daughter     No Known Problems Son     Cancer Maternal Grandfather         lung (smoker)    Prostate cancer Neg Hx     Heart disease Neg Hx     Kidney disease Neg Hx        Social History:  Social History     Tobacco Use    Smoking status: Never Smoker    Smokeless tobacco: Never Used   Substance and Sexual Activity    Alcohol use: Yes     Comment: rare, maybe holidays    Drug use: Not Currently     Types: Cocaine    Sexual activity: Not Currently     Partners: Female        Review of Systems     Review of Systems   Constitutional: Negative for chills and fever.   HENT: Negative for sore throat.    Respiratory: Positive for cough. Negative for shortness of breath.    Cardiovascular: Positive for chest pain.   Gastrointestinal: Negative for diarrhea, nausea and vomiting.   Genitourinary: Negative for dysuria.   Musculoskeletal: Negative for back pain.   Skin: Negative for rash.   Neurological: Positive for weakness.   Hematological: Does not bruise/bleed easily.   All other systems reviewed and are negative.     Physical Exam     Initial Vitals [07/30/22 2200]   BP Pulse Resp Temp SpO2   (!) 151/99 86 16 100 °F (37.8 °C) 98 %      MAP       --          Physical Exam  Nursing Notes and Vital Signs Reviewed.  Constitutional: Patient is in no acute distress. Well-developed and well-nourished.  Head: Atraumatic. Normocephalic.  Eyes: PERRL. EOM intact. Conjunctivae are not pale. No scleral icterus.  ENT: Mucous membranes are moist. Oropharynx is clear and symmetric.    Neck: Supple. Full ROM. No lymphadenopathy.  Cardiovascular: Regular rate. Regular rhythm. No  murmurs, rubs, or gallops. Distal pulses are 2+ and symmetric.  Pulmonary/Chest: Tachypneic. Expiratory wheezing at lung bases bilaterally.   Abdominal: Soft and non-distended.  There is no tenderness.  No rebound, guarding, or rigidity. Good bowel sounds.  Genitourinary: No CVA tenderness  Musculoskeletal: Moves all extremities. No obvious deformities. No edema. No calf tenderness.  Skin: Warm and dry.  Neurological:  Alert, awake, and appropriate.  Normal speech.  No acute focal neurological deficits are appreciated.  Psychiatric: Normal affect. Good eye contact. Appropriate in content.     ED Course   Critical Care    Date/Time: 7/30/2022 11:59 PM  Performed by: Linda Rand MD  Authorized by: Linda Rand MD   Direct patient critical care time: 8 minutes  Additional history critical care time: 8 minutes  Ordering / reviewing critical care time: 9 minutes  Documentation critical care time: 7 minutes  Consulting other physicians critical care time: 10 minutes  Total critical care time (exclusive of procedural time) : 42 minutes  Critical care time was exclusive of separately billable procedures and treating other patients and teaching time.  Critical care was necessary to treat or prevent imminent or life-threatening deterioration of the following conditions: Pneumonia, hypoxia.  Critical care was time spent personally by me on the following activities: blood draw for specimens, development of treatment plan with patient or surrogate, discussions with consultants, interpretation of cardiac output measurements, evaluation of patient's response to treatment, examination of patient, obtaining history from patient or surrogate, ordering and performing treatments and interventions, ordering and review of laboratory studies, ordering and review of radiographic studies, pulse oximetry, re-evaluation of patient's condition and review of old charts.        ED Vital Signs:  Vitals:    07/31/22 0118 07/31/22 0130  "07/31/22 0200 07/31/22 0320   BP:  126/64 (!) 113/58 136/72   Pulse:  81 76 73   Resp: (!) 24 (!) 34 (!) 29 (!) 24   Temp:    98.3 °F (36.8 °C)   TempSrc:    Oral   SpO2:  96% 96% (!) 93%   Weight:    90 kg (198 lb 6.6 oz)   Height:        07/31/22 0435 07/31/22 0730 07/31/22 0731 07/31/22 0752   BP: (!) 144/68  118/65    Pulse: 80 71 70 64   Resp:  18 (!) 24 20   Temp:   98.1 °F (36.7 °C)    TempSrc:   Axillary    SpO2:  96% 97% 95%   Weight:       Height:    5' 5" (1.651 m)    07/31/22 0954 07/31/22 1118 07/31/22 1129 07/31/22 1300   BP:  129/68     Pulse: 63 71  68   Resp:  12 20    Temp:  97.9 °F (36.6 °C)     TempSrc:  Oral     SpO2:  96%     Weight: 89.8 kg (198 lb)      Height: 5' 5" (1.651 m)       07/31/22 1340 07/31/22 1532 07/31/22 1612   BP:   119/63   Pulse: 73 67 69   Resp: 18  12   Temp:   97.9 °F (36.6 °C)   TempSrc:      SpO2: 96%  97%   Weight:      Height:          Abnormal Lab Results:  Labs Reviewed   CBC W/ AUTO DIFFERENTIAL - Abnormal; Notable for the following components:       Result Value    RBC 3.72 (*)     Hemoglobin 10.1 (*)     Hematocrit 31.4 (*)     RDW 17.9 (*)     Lymph % 17.3 (*)     All other components within normal limits   COMPREHENSIVE METABOLIC PANEL - Abnormal; Notable for the following components:    Glucose 143 (*)     Calcium 8.5 (*)     Albumin 2.6 (*)     Anion Gap 7 (*)     All other components within normal limits   URINALYSIS - Abnormal; Notable for the following components:    Protein, UA 2+ (*)     Glucose, UA 4+ (*)     All other components within normal limits   B-TYPE NATRIURETIC PEPTIDE - Abnormal; Notable for the following components:     (*)     All other components within normal limits   TROPONIN I - Abnormal; Notable for the following components:    Troponin I 0.155 (*)     All other components within normal limits   URINALYSIS MICROSCOPIC   SARS-COV-2 RDRP GENE    Narrative:     This test utilizes isothermal nucleic acid amplification   technology " to detect the SARS-CoV-2 RdRp nucleic acid segment.   The analytical sensitivity (limit of detection) is 125 genome   equivalents/mL.   A POSITIVE result implies infection with the SARS-CoV-2 virus;   the patient is presumed to be contagious.     A NEGATIVE result means that SARS-CoV-2 nucleic acids are not   present above the limit of detection. A NEGATIVE result should be   treated as presumptive. It does not rule out the possibility of   COVID-19 and should not be the sole basis for treatment decisions.   If COVID-19 is strongly suspected based on clinical and exposure   history, re-testing using an alternate molecular assay should be   considered.   This test is only for use under the Food and Drug   Administration s Emergency Use Authorization (EUA).   Commercial kits are provided by [employer].   Performance characteristics of the EUA have been independently   verified by [facility] Department of   Pathology and Laboratory Medicine.   _________________________________________________________________   The authorized Fact Sheet for Healthcare Providers and the authorized Fact   Sheet for Patients of the ID NOW COVID-19 are available on the FDA   website:     https://www.fda.gov/media/612897/download  https://www.fda.gov/media/567659/download                All Lab Results:  Results for orders placed or performed during the hospital encounter of 07/30/22   CBC auto differential   Result Value Ref Range    WBC 9.04 3.90 - 12.70 K/uL    RBC 3.72 (L) 4.60 - 6.20 M/uL    Hemoglobin 10.1 (L) 14.0 - 18.0 g/dL    Hematocrit 31.4 (L) 40.0 - 54.0 %    MCV 84 82 - 98 fL    MCH 27.2 27.0 - 31.0 pg    MCHC 32.2 32.0 - 36.0 g/dL    RDW 17.9 (H) 11.5 - 14.5 %    Platelets 159 150 - 450 K/uL    MPV 9.3 9.2 - 12.9 fL    Immature Granulocytes 0.3 0.0 - 0.5 %    Gran # (ANC) 6.6 1.8 - 7.7 K/uL    Immature Grans (Abs) 0.03 0.00 - 0.04 K/uL    Lymph # 1.6 1.0 - 4.8 K/uL    Mono # 0.8 0.3 - 1.0 K/uL    Eos # 0.1  0.0 - 0.5 K/uL    Baso # 0.01 0.00 - 0.20 K/uL    nRBC 0 0 /100 WBC    Gran % 72.9 38.0 - 73.0 %    Lymph % 17.3 (L) 18.0 - 48.0 %    Mono % 8.8 4.0 - 15.0 %    Eosinophil % 0.6 0.0 - 8.0 %    Basophil % 0.1 0.0 - 1.9 %    Differential Method Automated    Comprehensive metabolic panel   Result Value Ref Range    Sodium 142 136 - 145 mmol/L    Potassium 3.7 3.5 - 5.1 mmol/L    Chloride 107 95 - 110 mmol/L    CO2 28 23 - 29 mmol/L    Glucose 143 (H) 70 - 110 mg/dL    BUN 15 8 - 23 mg/dL    Creatinine 1.1 0.5 - 1.4 mg/dL    Calcium 8.5 (L) 8.7 - 10.5 mg/dL    Total Protein 6.4 6.0 - 8.4 g/dL    Albumin 2.6 (L) 3.5 - 5.2 g/dL    Total Bilirubin 0.5 0.1 - 1.0 mg/dL    Alkaline Phosphatase 73 55 - 135 U/L    AST 15 10 - 40 U/L    ALT 20 10 - 44 U/L    Anion Gap 7 (L) 8 - 16 mmol/L    eGFR if African American >60 >60 mL/min/1.73 m^2    eGFR if non African American >60 >60 mL/min/1.73 m^2   Urinalysis - Clean Catch   Result Value Ref Range    Specimen UA Urine, Clean Catch     Color, UA Yellow Yellow, Straw, Angela    Appearance, UA Clear Clear    pH, UA 7.0 5.0 - 8.0    Specific Gravity, UA 1.025 1.005 - 1.030    Protein, UA 2+ (A) Negative    Glucose, UA 4+ (A) Negative    Ketones, UA Negative Negative    Bilirubin (UA) Negative Negative    Occult Blood UA Negative Negative    Nitrite, UA Negative Negative    Urobilinogen, UA Negative <2.0 EU/dL    Leukocytes, UA Negative Negative   B-Type natriuretic peptide (BNP)   Result Value Ref Range     (H) 0 - 99 pg/mL   Troponin I   Result Value Ref Range    Troponin I 0.155 (H) 0.000 - 0.026 ng/mL   Urinalysis Microscopic   Result Value Ref Range    RBC, UA 0 0 - 4 /hpf    WBC, UA 0 0 - 5 /hpf    Bacteria None None-Occ /hpf    Yeast, UA None None    Hyaline Casts, UA 0 0-1/lpf /lpf    Microscopic Comment SEE COMMENT    Comprehensive Metabolic Panel (CMP)   Result Value Ref Range    Sodium 142 136 - 145 mmol/L    Potassium 3.8 3.5 - 5.1 mmol/L    Chloride 106 95 - 110  mmol/L    CO2 28 23 - 29 mmol/L    Glucose 152 (H) 70 - 110 mg/dL    BUN 19 8 - 23 mg/dL    Creatinine 1.1 0.5 - 1.4 mg/dL    Calcium 8.0 (L) 8.7 - 10.5 mg/dL    Total Protein 5.9 (L) 6.0 - 8.4 g/dL    Albumin 2.6 (L) 3.5 - 5.2 g/dL    Total Bilirubin 0.5 0.1 - 1.0 mg/dL    Alkaline Phosphatase 68 55 - 135 U/L    AST 13 10 - 40 U/L    ALT 18 10 - 44 U/L    Anion Gap 8 8 - 16 mmol/L    eGFR if African American >60 >60 mL/min/1.73 m^2    eGFR if non African American >60 >60 mL/min/1.73 m^2   Procalcitonin   Result Value Ref Range    Procalcitonin 0.06 <0.25 ng/mL   Troponin I   Result Value Ref Range    Troponin I 0.151 (H) 0.000 - 0.026 ng/mL   Troponin I   Result Value Ref Range    Troponin I 0.138 (H) 0.000 - 0.026 ng/mL   POCT COVID-19 Rapid Screening   Result Value Ref Range    POC Rapid COVID Negative Negative     Acceptable Yes    Echo   Result Value Ref Range    BSA 2.03 m2    TDI SEPTAL 0.04 m/s    LV LATERAL E/E' RATIO 26.00 m/s    LV SEPTAL E/E' RATIO 32.50 m/s    LA WIDTH 3.60 cm    IVC diameter 2.26 cm    Left Ventricular Outflow Tract Mean Velocity 0.90805502438211 cm/s    Left Ventricular Outflow Tract Mean Gradient 2.00 mmHg    TDI LATERAL 0.05 m/s    LVIDd 4.08 3.5 - 6.0 cm    IVS 2.06 (A) 0.6 - 1.1 cm    Posterior Wall 1.93 (A) 0.6 - 1.1 cm    Ao root annulus 2.90 cm    LVIDs 3.20 2.1 - 4.0 cm    FS 22 28 - 44 %    LA volume 80.06 cm3    STJ 2.86 cm    Ascending aorta 3.13 cm    LV mass 381.36 g    LA size 4.71 cm    Left Ventricle Relative Wall Thickness 0.95 cm    AV mean gradient 9 mmHg    AV valve area 1.66 cm2    AV Velocity Ratio 0.46     AV index (prosthetic) 0.45     MV valve area p 1/2 method 3.83 cm2    E/A ratio 2.89     Mean e' 0.05 m/s    E wave deceleration time 198.148896607180610 msec    IVRT 59.555755476776364 msec    LVOT diameter 2.17 cm    LVOT area 3.7 cm2    LVOT peak myrna 0.91 m/s    LVOT peak VTI 18.60 cm    Ao peak myrna 2.00 m/s    Ao VTI 41.3 cm    RVOT peak myrna  0.52 m/s    RVOT peak VTI 9.6 cm    Mr max mathew 4.25 m/s    LVOT stroke volume 68.75 cm3    AV peak gradient 16 mmHg    PV mean gradient 0.64 mmHg    E/E' ratio 28.89 m/s    MV Peak E Mathew 1.30 m/s    TR Max Mathew 2.76 m/s    MV stenosis pressure 1/2 time 57.031088161439327 ms    MV Peak A Mathew 0.45 m/s    LV Systolic Volume 40.99 mL    LV Systolic Volume Index 20.8 mL/m2    LV Diastolic Volume 73.19 mL    LV Diastolic Volume Index 37.15 mL/m2    LA Volume Index 40.6 mL/m2    LV Mass Index 194 g/m2    RA Major Axis 4.41 cm    Left Atrium Minor Axis 5.57 cm    Left Atrium Major Axis 5.54 cm    Triscuspid Valve Regurgitation Peak Gradient 30 mmHg    Right Atrial Pressure (from IVC) 15 mmHg    EF 40 %    TV rest pulmonary artery pressure 45 mmHg   POCT glucose   Result Value Ref Range    POCT Glucose 238 (H) 70 - 110 mg/dL     *Note: Due to a large number of results and/or encounters for the requested time period, some results have not been displayed. A complete set of results can be found in Results Review.         Imaging Results:  Imaging Results           X-Ray Chest AP Portable (Final result)  Result time 07/30/22 23:02:37    Final result by Gato Hebert MD (07/30/22 23:02:37)                 Impression:      Left basilar opacity concerning for atelectasis or infection.  Correlation is advised.    This report was flagged in Epic as abnormal.      Electronically signed by: Gato Hebert  Date:    07/30/2022  Time:    23:02             Narrative:    EXAMINATION:  XR CHEST AP PORTABLE    CLINICAL HISTORY:  Chest Pain;    TECHNIQUE:  Single frontal view of the chest was performed.    COMPARISON:  Multiple priors.    FINDINGS:  Left basilar opacity concerning for infection or atelectasis.  Correlation is advised.  Otherwise the lungs appear clear.    The cardiac silhouette is normal in size. The hilar and mediastinal contours are unremarkable.    Left shoulder arthroplasty.                                             The Emergency Provider reviewed the vital signs and test results, which are outlined above.     ED Discussion     1:30 AM: Discussed case with Josefa Collins ([facility]). Dr. Baumann agrees with current care and management of pt and accepts admission.   Admitting Service: Hospital Medicine  Admitting Physician: Dr. Baumann  Admit to: Obs Tele    1:30 AM: Re-evaluated pt. I have discussed test results, shared treatment plan, and the need for admission with patient and family at bedside. Pt and family express understanding at this time and agree with all information. All questions answered. Pt and family have no further questions or concerns at this time. Pt is ready for admit.           Medical Decision Making:   Clinical Tests:   Lab Tests: Ordered and Reviewed  Radiological Study: Ordered and Reviewed           ED Medication(s):  Medications   acetaminophen tablet 1,000 mg (has no administration in time range)   aspirin EC tablet 81 mg (81 mg Oral Given 7/31/22 0912)   atorvastatin tablet 40 mg (40 mg Oral Given 7/31/22 0434)   apixaban tablet 5 mg (5 mg Oral Given 7/31/22 0912)   gabapentin capsule 800 mg (800 mg Oral Given 7/31/22 1530)   isosorbide mononitrate 24 hr tablet 60 mg (60 mg Oral Given 7/31/22 0912)   lisinopriL tablet 10 mg (10 mg Oral Given 7/31/22 0912)   metoprolol succinate (TOPROL-XL) 24 hr tablet 50 mg (50 mg Oral Given 7/31/22 0435)   pantoprazole EC tablet 40 mg (40 mg Oral Given 7/31/22 0912)   sertraline tablet 100 mg (100 mg Oral Given 7/31/22 0436)   tamsulosin 24 hr capsule 0.4 mg (0.4 mg Oral Given 7/31/22 0912)   traZODone tablet 200 mg (200 mg Oral Given 7/31/22 0434)   ranolazine 12 hr tablet 1,000 mg (1,000 mg Oral Given 7/31/22 0912)   sodium chloride 0.9% flush 10 mL (has no administration in time range)   naloxone 0.4 mg/mL injection 0.02 mg (has no administration in time range)   glucose chewable tablet 16 g (has no administration in time range)   glucose chewable  tablet 24 g (has no administration in time range)   glucagon (human recombinant) injection 1 mg (has no administration in time range)   HYDROcodone-acetaminophen 5-325 mg per tablet 1 tablet (has no administration in time range)   HYDROcodone-acetaminophen  mg per tablet 1 tablet (1 tablet Oral Given 7/31/22 1129)   ondansetron injection 4 mg (has no administration in time range)   insulin aspart U-100 pen 0-5 Units (2 Units Subcutaneous Given 7/31/22 1129)   cefTRIAXone (ROCEPHIN) 1 g/50 mL D5W IVPB (has no administration in time range)   azithromycin 500 mg in dextrose 5 % 250 mL IVPB (ready to mix system) (0 mg Intravenous Stopped 7/31/22 0522)   dextrose 10% bolus 125 mL (has no administration in time range)   dextrose 10% bolus 250 mL (has no administration in time range)   budesonide nebulizer solution 0.5 mg (0.5 mg Nebulization Given 7/31/22 0735)   arformoteroL nebulizer solution 15 mcg (15 mcg Nebulization Given 7/31/22 0730)   albuterol-ipratropium 2.5 mg-0.5 mg/3 mL nebulizer solution 3 mL (3 mLs Nebulization Given 7/31/22 1340)   furosemide injection 40 mg (40 mg Intravenous Given 7/31/22 1124)   sodium chloride 0.9% flush 10 mL (has no administration in time range)   dextromethorphan-guaiFENesin  mg/5 ml liquid 5 mL (has no administration in time range)   cefTRIAXone (ROCEPHIN) 1 g/50 mL D5W IVPB (0 g Intravenous Stopped 7/31/22 0100)   HYDROcodone-acetaminophen  mg per tablet 1 tablet (1 tablet Oral Given 7/31/22 0118)       Current Discharge Medication List      START taking these medications    Details   azithromycin (Z-HOLDEN) 250 MG tablet Take 1 tablet (250 mg total) by mouth once daily. Take first 2 tablets together, then 1 every day until finished.  Qty: 6 tablet, Refills: 0              Follow-up Information     Eunice Mora NP. Schedule an appointment as soon as possible for a visit in 1 week.    Specialty: Family Medicine  Contact information:  [address] [facility]  [address]  [place] [address]  [phone]                             Scribe Attestation:   Scribe #1: I performed the above scribed service and the documentation accurately describes the services I performed. I attest to the accuracy of the note.     Attending:   Physician Attestation Statement for Scribe #1: I, Linda Rand MD, personally performed the services described in this documentation, as scribed by Mohsen Monzon, in my presence, and it is both accurate and complete.           Clinical Impression       ICD-10-CM ICD-9-CM   1. Pneumonia of left lower lobe due to infectious organism  J18.9 486   2. Hypoxia  R09.02 799.02   3. Chest pain  R07.9 786.50   4. Chest pain  R07.9 786.50       Disposition:   Disposition: Admitted  Condition: Stable         Linda Rand MD  07/31/22 9931

## 2022-07-31 NOTE — CONSULTS
[facility] - Tele[facility] ([facility])  Cardiology  Consult Note    Patient Name: Crow Green  MRN: [id number]  Admission Date: 7/30/2022  Hospital Length of Stay: 0 days  Code Status: Full Code   Attending Provider: Dakota Francis MD   Consulting Provider: Franklin Drake Md, MD  Primary Care Physician: Eunice Mora NP  Principal Problem:Acute on chronic respiratory failure with hypoxia    Patient information was obtained from patient, past medical records, ER records and primary team.     Inpatient consult to Cardiology  Consult performed by: Franklin Drake MD  Consult ordered by: Josefa Collins NP  Reason for consult: CHF, elevated trop         Subjective:     Chief Complaint:  CP, SOB, cough     HPI:   HPI: Crow Green is a 67 y.o. male patient with a PMHx of asthma, atrial fibrillation with RVR, CHF, chronic obstructive pulmonary disease, DM, elevated PSA, HTN, MI who presents to the Emergency Department for evaluation of mid-sternal chest pain which onset gradually yesterday. Pt also c/o a cough and feeling weak. Symptoms are constant and moderate in severity. No mitigating or exacerbating factors reported. Associated sxs include weakness, cough. Patient denies any n/v/d, fever, chills, and all other sxs at this time.   In the ED O2 sats 86%, RR 30, CBC unremarkable, , Troponin 0.155, CXR showed Left basilar opacity concerning for atelectasis or infection.       Cardiology consulted for CHF exac with elevated trop. Trop trending down. ECHO today with stable LV function 40-45 with mild dec RV function, inc CVP, PASP 45 mmHg. Diuresing well now with IV lasix; pt admits to dietary non compliance with adding salt. Has cough now with SOB/mild CP.       Results for orders placed during the hospital encounter of 07/30/22    Echo    Interpretation Summary  · The left ventricle is normal in size with severe concentric hypertrophy and mildly decreased systolic function.  · Mild left atrial  enlargement.  · The estimated ejection fraction is 40 - 45%.  · Grade III left ventricular diastolic dysfunction.  · Normal right ventricular size with mildly reduced right ventricular systolic function.  · Mild tricuspid regurgitation.  · Mild pulmonic regurgitation.  · Elevated central venous pressure (15 mmHg).  · The estimated PA systolic pressure is 45 mmHg.  · There is pulmonary hypertension.        Past Medical History:   Diagnosis Date    Arthritis     Asthma     Atrial fibrillation     Atrial fibrillation with RVR 8/7/2019    CHF (congestive heart failure)     Chronic obstructive pulmonary disease 8/5/2020    Depression     Dermatomyositis     Diabetes mellitus, type 2 Diagnosed in 2000    Elevated PSA     Follow up 7/30/2020    Hypertension     Myocardial infarction     2017    Sleep apnea        Past Surgical History:   Procedure Laterality Date    ABLATION OF ARRHYTHMOGENIC FOCUS FOR ATRIAL FIBRILLATION N/A 2/27/2020    Procedure: Ablation atrial fibrillation;  Surgeon: Gio Brown MD;  Location: [facility] EP LAB;  Service: Cardiology;  Laterality: N/A;  afib, DAMIEN (cx if SR), PVI, PITO, anes, MB, 3 Prep    CHOLECYSTECTOMY      CLOSURE OF WOUND Left 7/1/2020    Procedure: CLOSURE, WOUND;  Surgeon: Barb Veras DPM;  Location: [facility] OR;  Service: Podiatry;  Laterality: Left;    COLONOSCOPY N/A 3/4/2022    Procedure: COLONOSCOPY;  Surgeon: Yolis Freitas MD;  Location: [facility] ENDO;  Service: Endoscopy;  Laterality: N/A;    ESOPHAGOGASTRODUODENOSCOPY N/A 3/4/2022    Procedure: EGD (ESOPHAGOGASTRODUODENOSCOPY);  Surgeon: Yolis Freitas MD;  Location: [facility] ENDO;  Service: Endoscopy;  Laterality: N/A;    INJECTION OF ANESTHETIC AGENT INTO SACROILIAC JOINT Left 3/24/2021    Procedure: Left BLOCK, SACROILIAC JOINT and bilateral rhomboid TPI;  Surgeon: Dillan Tsai MD;  Location: [facility] PAIN MGT;  Service: Pain Management;  Laterality: Left;    INTRALUMINAL GASTROINTESTINAL TRACT  IMAGING VIA CAPSULE N/A 3/22/2022    Procedure: IMAGING PROCEDURE, GI TRACT, INTRALUMINAL, VIA CAPSULE;  Surgeon: Justice Martinez RN;  Location: [facility] ENDO;  Service: Endoscopy;  Laterality: N/A;    REVERSE TOTAL SHOULDER ARTHROPLASTY Left 1/10/2022    Procedure: ARTHROPLASTY, SHOULDER, TOTAL, REVERSE;  Surgeon: Anthony Alvarado MD;  Location: [facility] OR;  Service: Orthopedics;  Laterality: Left;  left reverse total shoulder arthroplasty     SELECTIVE INJECTION OF ANESTHETIC AGENT AROUND LUMBAR SPINAL NERVE ROOT BY TRANSFORAMINAL APPROACH Left 6/11/2020    Procedure: BLOCK, SPINAL NERVE ROOT, LUMBAR, SELECTIVE, TRANSFORAMINAL APPROACH Left L4-5, L5-S1 TFESI with RN IV sedation;  Surgeon: Dillan Tsai MD;  Location: [facility] PAIN MGT;  Service: Pain Management;  Laterality: Left;    TOE AMPUTATION Left 6/29/2020    Procedure: AMPUTATION, TOE;  Surgeon: Barb Veras DPM;  Location: [facility] OR;  Service: Podiatry;  Laterality: Left;  great toe       Review of patient's allergies indicates:   Allergen Reactions    Protamine Hives     Urticaria, possible upper airway swelling       No current facility-administered medications on file prior to encounter.     Current Outpatient Medications on File Prior to Encounter   Medication Sig    acetaminophen (TYLENOL) 500 MG tablet Take 2 tablets (1,000 mg total) by mouth every 8 (eight) hours as needed for Pain.    albuterol (PROVENTIL) 2.5 mg /3 mL (0.083 %) nebulizer solution Take 3 mLs (2.5 mg total) by nebulization every 4 to 6 hours as needed for Wheezing or Shortness of Breath.    allopurinoL (ZYLOPRIM) 100 MG tablet Take 1 tablet (100 mg total) by mouth once daily.    amLODIPine (NORVASC) 2.5 MG tablet Take 1 tablet (2.5 mg total) by mouth every evening.    aspirin (ECOTRIN) 81 MG EC tablet Take 1 tablet (81 mg total) by mouth once daily.    atorvastatin (LIPITOR) 40 MG tablet TAKE 1 TABLET BY MOUTH EVERY EVENING    azaTHIOprine (IMURAN) 50 mg Tab Take 1 tablet (50  mg total) by mouth once daily.    baclofen (LIORESAL) 10 MG tablet Take 1 tablet (10 mg total) by mouth once daily.    blood sugar diagnostic Strp 1 each by Misc.(Non-Drug; Combo Route) route 4 (four) times daily.    blood-glucose meter (TRUE METRIX GLUCOSE METER) kit Use as instructed to test blood glucose meter daily.    ELIQUIS 5 mg Tab Take 1 tablet (5 mg total) by mouth 2 (two) times daily.    fluticasone-salmeterol diskus inhaler 250-50 mcg Inhale 1 puff into the lungs 2 (two) times daily. Controller    furosemide (LASIX) 40 MG tablet Take 1 tablet (40 mg total) by mouth once daily.    gabapentin (NEURONTIN) 800 MG tablet Take 1 tablet (800 mg total) by mouth 3 (three) times daily.    HYDROcodone-acetaminophen (NORCO) 7.5-325 mg per tablet SMARTSI Tablet(s) By Mouth Every 12 Hours PRN    insulin aspart U-100 (NOVOLOG FLEXPEN U-100 INSULIN) 100 unit/mL (3 mL) InPn pen INJECT 10 UNITS SUBCUTANEOUSLY THREE TIMES DAILY WITH MEALS    insulin degludec (TRESIBA FLEXTOUCH U-200) 200 unit/mL (3 mL) insulin pen Inject 40 Units into the skin once daily. (Patient taking differently: Inject 38 Units into the skin once daily.)    ipratropium (ATROVENT) 0.02 % nebulizer solution INHALE THE CONTENTS OF 1 VIAL VIA NEBULIZER FOUR TIMES DAILY    isosorbide mononitrate (IMDUR) 60 MG 24 hr tablet Take 1 tablet (60 mg total) by mouth 2 (two) times a day.    JARDIANCE 25 mg tablet TAKE 1 TABLET(25 MG) BY MOUTH EVERY DAY    latanoprost 0.005 % ophthalmic solution INSTILL 1 DROP INTO BOTH EYES ONE TIME DAILY    lisinopriL 10 MG tablet Take 1 tablet (10 mg total) by mouth once daily.    metoprolol succinate (TOPROL-XL) 50 MG 24 hr tablet Take 1 tablet (50 mg total) by mouth every evening.    MICRO THIN LANCETS 33 gauge Misc     OZEMPIC 1 mg/dose (2 mg/1.5 mL) PnIj Inject 1 mg into the skin every 7 days.    pantoprazole (PROTONIX) 40 MG tablet Take 1 tablet (40 mg total) by mouth once daily.    ranolazine (RANEXA)  1,000 mg Tb12 Take 1 tablet (1,000 mg total) by mouth 2 (two) times daily.    sertraline (ZOLOFT) 100 MG tablet Take 1 tablet (100 mg total) by mouth every evening.    tacrolimus (PROTOPIC) 0.1 % ointment Apply topically 2 (two) times daily.    tamsulosin (FLOMAX) 0.4 mg Cap Take 1 capsule (0.4 mg total) by mouth once daily.    tiotropium (SPIRIVA WITH HANDIHALER) 18 mcg inhalation capsule INHALE THE CONTENTS OF 1 CAPSULE ONE TIME DAILY (CONTROLLER)    traMADoL (ULTRAM) 50 mg tablet Take 1 tablet (50 mg total) by mouth every 8 (eight) hours as needed for Pain.    traZODone (DESYREL) 100 MG tablet Take 2 tablets (200 mg total) by mouth every evening.    triamcinolone acetonide 0.1% (KENALOG) 0.1 % ointment Apply topically 2 (two) times daily. Use on legs and back    VIOS AEROSOL DELIVERY SYSTEM Shefali USE AS DIRESTED    [DISCONTINUED] diclofenac sodium (VOLTAREN) 1 % Gel APPLY 2 GRAMS TOPICALLY FOUR TIMES DAILY    [DISCONTINUED] nitroGLYCERIN (NITROSTAT) 0.4 MG SL tablet Place 1 tablet (0.4 mg total) under the tongue every 5 (five) minutes as needed for Chest pain.     Family History       Problem Relation (Age of Onset)    Cancer Maternal Grandfather    Cataracts Mother, Father, Sister    Diabetes Mother, Sister, Maternal Aunt    Glaucoma Mother, Sister, Maternal Aunt    No Known Problems Brother, Daughter, Son    Stroke Father, Sister          Tobacco Use    Smoking status: Never Smoker    Smokeless tobacco: Never Used   Substance and Sexual Activity    Alcohol use: Yes     Comment: rare, maybe holidays    Drug use: Not Currently     Types: Cocaine    Sexual activity: Not Currently     Partners: Female     Review of Systems   Constitutional:  Positive for fatigue.   HENT: Negative.     Eyes: Negative.    Respiratory:  Positive for cough and shortness of breath.    Cardiovascular:  Positive for chest pain.   Gastrointestinal: Negative.    Endocrine: Negative.    Genitourinary: Negative.     Musculoskeletal: Negative.    Skin: Negative.    Allergic/Immunologic: Negative.    Neurological: Negative.    Hematological: Negative.    Psychiatric/Behavioral: Negative.     Objective:     Vital Signs (Most Recent):  Temp: 97.9 °F (36.6 °C) (07/31/22 1118)  Pulse: 73 (07/31/22 1340)  Resp: 18 (07/31/22 1340)  BP: 129/68 (07/31/22 1118)  SpO2: 96 % (07/31/22 1340)   Vital Signs (24h Range):  Temp:  [97.9 °F (36.6 °C)-100 °F (37.8 °C)] 97.9 °F (36.6 °C)  Pulse:  [63-87] 73  Resp:  [12-34] 18  SpO2:  [86 %-99 %] 96 %  BP: (113-151)/(58-99) 129/68     Weight: 89.8 kg (198 lb)  Body mass index is 32.95 kg/m².    Physical Exam  Vitals and nursing note reviewed.   Constitutional:       Appearance: Normal appearance. He is well-developed.      Interventions: Nasal cannula in place.   HENT:      Head: Normocephalic and atraumatic.   Eyes:      Pupils: Pupils are equal, round, and reactive to light.   Cardiovascular:      Rate and Rhythm: Normal rate and regular rhythm.      Pulses: Normal pulses.      Heart sounds: Normal heart sounds.   Pulmonary:      Effort: Pulmonary effort is normal. Tachypnea present. No accessory muscle usage or respiratory distress.      Breath sounds: Decreased breath sounds and wheezing present.   Abdominal:      General: Bowel sounds are normal.      Palpations: Abdomen is soft.      Tenderness: There is no abdominal tenderness.   Musculoskeletal:         General: Normal range of motion.      Cervical back: Normal range of motion and neck supple.   Skin:     General: Skin is warm and dry.      Capillary Refill: Capillary refill takes less than 2 seconds.   Neurological:      Mental Status: He is alert and oriented to person, place, and time.      Deep Tendon Reflexes: Reflexes are normal and symmetric.   Psychiatric:         Speech: Speech normal.         Behavior: Behavior normal.         Thought Content: Thought content normal.         Judgment: Judgment normal.        Significant Labs:    Results for orders placed or performed during the hospital encounter of 07/30/22   CBC auto differential   Result Value Ref Range    WBC 9.04 3.90 - 12.70 K/uL    RBC 3.72 (L) 4.60 - 6.20 M/uL    Hemoglobin 10.1 (L) 14.0 - 18.0 g/dL    Hematocrit 31.4 (L) 40.0 - 54.0 %    MCV 84 82 - 98 fL    MCH 27.2 27.0 - 31.0 pg    MCHC 32.2 32.0 - 36.0 g/dL    RDW 17.9 (H) 11.5 - 14.5 %    Platelets 159 150 - 450 K/uL    MPV 9.3 9.2 - 12.9 fL    Immature Granulocytes 0.3 0.0 - 0.5 %    Gran # (ANC) 6.6 1.8 - 7.7 K/uL    Immature Grans (Abs) 0.03 0.00 - 0.04 K/uL    Lymph # 1.6 1.0 - 4.8 K/uL    Mono # 0.8 0.3 - 1.0 K/uL    Eos # 0.1 0.0 - 0.5 K/uL    Baso # 0.01 0.00 - 0.20 K/uL    nRBC 0 0 /100 WBC    Gran % 72.9 38.0 - 73.0 %    Lymph % 17.3 (L) 18.0 - 48.0 %    Mono % 8.8 4.0 - 15.0 %    Eosinophil % 0.6 0.0 - 8.0 %    Basophil % 0.1 0.0 - 1.9 %    Differential Method Automated    Comprehensive metabolic panel   Result Value Ref Range    Sodium 142 136 - 145 mmol/L    Potassium 3.7 3.5 - 5.1 mmol/L    Chloride 107 95 - 110 mmol/L    CO2 28 23 - 29 mmol/L    Glucose 143 (H) 70 - 110 mg/dL    BUN 15 8 - 23 mg/dL    Creatinine 1.1 0.5 - 1.4 mg/dL    Calcium 8.5 (L) 8.7 - 10.5 mg/dL    Total Protein 6.4 6.0 - 8.4 g/dL    Albumin 2.6 (L) 3.5 - 5.2 g/dL    Total Bilirubin 0.5 0.1 - 1.0 mg/dL    Alkaline Phosphatase 73 55 - 135 U/L    AST 15 10 - 40 U/L    ALT 20 10 - 44 U/L    Anion Gap 7 (L) 8 - 16 mmol/L    eGFR if African American >60 >60 mL/min/1.73 m^2    eGFR if non African American >60 >60 mL/min/1.73 m^2   Urinalysis - Clean Catch   Result Value Ref Range    Specimen UA Urine, Clean Catch     Color, UA Yellow Yellow, Straw, Angela    Appearance, UA Clear Clear    pH, UA 7.0 5.0 - 8.0    Specific Gravity, UA 1.025 1.005 - 1.030    Protein, UA 2+ (A) Negative    Glucose, UA 4+ (A) Negative    Ketones, UA Negative Negative    Bilirubin (UA) Negative Negative    Occult Blood UA Negative Negative    Nitrite, UA Negative  Negative    Urobilinogen, UA Negative <2.0 EU/dL    Leukocytes, UA Negative Negative   B-Type natriuretic peptide (BNP)   Result Value Ref Range     (H) 0 - 99 pg/mL   Troponin I   Result Value Ref Range    Troponin I 0.155 (H) 0.000 - 0.026 ng/mL   Urinalysis Microscopic   Result Value Ref Range    RBC, UA 0 0 - 4 /hpf    WBC, UA 0 0 - 5 /hpf    Bacteria None None-Occ /hpf    Yeast, UA None None    Hyaline Casts, UA 0 0-1/lpf /lpf    Microscopic Comment SEE COMMENT    Comprehensive Metabolic Panel (CMP)   Result Value Ref Range    Sodium 142 136 - 145 mmol/L    Potassium 3.8 3.5 - 5.1 mmol/L    Chloride 106 95 - 110 mmol/L    CO2 28 23 - 29 mmol/L    Glucose 152 (H) 70 - 110 mg/dL    BUN 19 8 - 23 mg/dL    Creatinine 1.1 0.5 - 1.4 mg/dL    Calcium 8.0 (L) 8.7 - 10.5 mg/dL    Total Protein 5.9 (L) 6.0 - 8.4 g/dL    Albumin 2.6 (L) 3.5 - 5.2 g/dL    Total Bilirubin 0.5 0.1 - 1.0 mg/dL    Alkaline Phosphatase 68 55 - 135 U/L    AST 13 10 - 40 U/L    ALT 18 10 - 44 U/L    Anion Gap 8 8 - 16 mmol/L    eGFR if African American >60 >60 mL/min/1.73 m^2    eGFR if non African American >60 >60 mL/min/1.73 m^2   Procalcitonin   Result Value Ref Range    Procalcitonin 0.06 <0.25 ng/mL   Troponin I   Result Value Ref Range    Troponin I 0.151 (H) 0.000 - 0.026 ng/mL   Troponin I   Result Value Ref Range    Troponin I 0.138 (H) 0.000 - 0.026 ng/mL   POCT COVID-19 Rapid Screening   Result Value Ref Range    POC Rapid COVID Negative Negative     Acceptable Yes    Echo   Result Value Ref Range    BSA 2.03 m2    TDI SEPTAL 0.04 m/s    LV LATERAL E/E' RATIO 26.00 m/s    LV SEPTAL E/E' RATIO 32.50 m/s    LA WIDTH 3.60 cm    IVC diameter 2.26 cm    Left Ventricular Outflow Tract Mean Velocity 0.80709665543145 cm/s    Left Ventricular Outflow Tract Mean Gradient 2.00 mmHg    TDI LATERAL 0.05 m/s    LVIDd 4.08 3.5 - 6.0 cm    IVS 2.06 (A) 0.6 - 1.1 cm    Posterior Wall 1.93 (A) 0.6 - 1.1 cm    Ao root annulus 2.90  cm    LVIDs 3.20 2.1 - 4.0 cm    FS 22 28 - 44 %    LA volume 80.06 cm3    STJ 2.86 cm    Ascending aorta 3.13 cm    LV mass 381.36 g    LA size 4.71 cm    Left Ventricle Relative Wall Thickness 0.95 cm    AV mean gradient 9 mmHg    AV valve area 1.66 cm2    AV Velocity Ratio 0.46     AV index (prosthetic) 0.45     MV valve area p 1/2 method 3.83 cm2    E/A ratio 2.89     Mean e' 0.05 m/s    E wave deceleration time 198.312151790026628 msec    IVRT 59.154717486134710 msec    LVOT diameter 2.17 cm    LVOT area 3.7 cm2    LVOT peak mathew 0.91 m/s    LVOT peak VTI 18.60 cm    Ao peak mathew 2.00 m/s    Ao VTI 41.3 cm    RVOT peak mathew 0.52 m/s    RVOT peak VTI 9.6 cm    Mr max mathew 4.25 m/s    LVOT stroke volume 68.75 cm3    AV peak gradient 16 mmHg    PV mean gradient 0.64 mmHg    E/E' ratio 28.89 m/s    MV Peak E Mathew 1.30 m/s    TR Max Mathew 2.76 m/s    MV stenosis pressure 1/2 time 57.401984172486706 ms    MV Peak A Mathew 0.45 m/s    LV Systolic Volume 40.99 mL    LV Systolic Volume Index 20.8 mL/m2    LV Diastolic Volume 73.19 mL    LV Diastolic Volume Index 37.15 mL/m2    LA Volume Index 40.6 mL/m2    LV Mass Index 194 g/m2    RA Major Axis 4.41 cm    Left Atrium Minor Axis 5.57 cm    Left Atrium Major Axis 5.54 cm    Triscuspid Valve Regurgitation Peak Gradient 30 mmHg    Right Atrial Pressure (from IVC) 15 mmHg    EF 40 %    TV rest pulmonary artery pressure 45 mmHg   POCT glucose   Result Value Ref Range    POCT Glucose 238 (H) 70 - 110 mg/dL     *Note: Due to a large number of results and/or encounters for the requested time period, some results have not been displayed. A complete set of results can be found in Results Review.        Significant Imaging:   Imaging Results               X-Ray Chest AP Portable (Final result)  Result time 07/30/22 23:02:37      Final result by Gato Hebert MD (07/30/22 23:02:37)                   Impression:      Left basilar opacity concerning for atelectasis or infection.   Correlation is advised.    This report was flagged in Epic as abnormal.      Electronically signed by: Gato Emilee  Date:    07/30/2022  Time:    23:02               Narrative:    EXAMINATION:  XR CHEST AP PORTABLE    CLINICAL HISTORY:  Chest Pain;    TECHNIQUE:  Single frontal view of the chest was performed.    COMPARISON:  Multiple priors.    FINDINGS:  Left basilar opacity concerning for infection or atelectasis.  Correlation is advised.  Otherwise the lungs appear clear.    The cardiac silhouette is normal in size. The hilar and mediastinal contours are unremarkable.    Left shoulder arthroplasty.                                     Review of Systems   Constitutional: Positive for fatigue.   HENT: Negative.     Eyes: Negative.    Cardiovascular:  Positive for chest pain.   Respiratory:  Positive for cough and shortness of breath.    Endocrine: Negative.    Hematologic/Lymphatic: Negative.    Skin: Negative.    Musculoskeletal: Negative.    Gastrointestinal: Negative.    Genitourinary: Negative.    Neurological: Negative.    Psychiatric/Behavioral: Negative.     Allergic/Immunologic: Negative.    Physical Exam  Vitals and nursing note reviewed.   Constitutional:       Appearance: Normal appearance. He is well-developed.      Interventions: Nasal cannula in place.   HENT:      Head: Normocephalic and atraumatic.   Eyes:      Pupils: Pupils are equal, round, and reactive to light.   Cardiovascular:      Rate and Rhythm: Normal rate and regular rhythm.      Pulses: Normal pulses.      Heart sounds: Normal heart sounds.   Pulmonary:      Effort: Pulmonary effort is normal. Tachypnea present. No accessory muscle usage or respiratory distress.      Breath sounds: Decreased breath sounds and wheezing present.   Abdominal:      General: Bowel sounds are normal.      Palpations: Abdomen is soft.      Tenderness: There is no abdominal tenderness.   Musculoskeletal:         General: Normal range of motion.      Cervical  back: Normal range of motion and neck supple.   Skin:     General: Skin is warm and dry.      Capillary Refill: Capillary refill takes less than 2 seconds.   Neurological:      Mental Status: He is alert and oriented to person, place, and time.      Deep Tendon Reflexes: Reflexes are normal and symmetric.   Psychiatric:         Speech: Speech normal.         Behavior: Behavior normal.         Thought Content: Thought content normal.         Judgment: Judgment normal.       Assessment and Plan:     Pneumonia  Cont tx per primary team     Chronic obstructive pulmonary disease  Cont tx per primary team     Elevated troponin  Sec to demand ischemia; h/o non obs CAD on prior cath  Cont asa, statin, bb, Ranexa  Ischemic workup once diuresed, outpt if stable without CP/SOB    PAF (paroxysmal atrial fibrillation)  Cont Eliquis and BB  In NSR    Other chest pain  Sec to CHF  Cont tx per CHF/elevated trop    SANDRITA on CPAP  Cont CPAP    Acute on chronic combined systolic and diastolic heart failure  Cont IV diuresis monitor I/O   Needs low salt compliance  CHF nursing education and f/u CHF clinic upon DC    Essential hypertension  Titrate meds         VTE Risk Mitigation (From admission, onward)         Ordered     apixaban tablet 5 mg  2 times daily         07/31/22 0322     IP VTE HIGH RISK PATIENT  Once         07/31/22 0322     Place sequential compression device  Until discontinued         07/31/22 0322     Reason for No Pharmacological VTE Prophylaxis  Once        Question:  Reasons:  Answer:  Already adequately anticoagulated on oral Anticoagulants    07/31/22 0322                Thank you for your consult. I will follow-up with patient. Please contact us if you have any additional questions.    Franklin Drake Md, MD  Cardiology   [facility] - Telemetry ([facility])

## 2022-07-31 NOTE — SUBJECTIVE & OBJECTIVE
Interval History: Started on Lasix IV this. Will monitor response. Cardiology following.     Review of Systems   Constitutional:  Positive for fatigue. Negative for chills, diaphoresis and fever.   HENT: Negative.  Negative for congestion, ear discharge, rhinorrhea, sinus pressure, sore throat and trouble swallowing.    Eyes: Negative.  Negative for discharge and visual disturbance.   Respiratory:  Positive for cough and shortness of breath. Negative for apnea, choking, chest tightness, wheezing and stridor.    Cardiovascular:  Positive for chest pain. Negative for palpitations and leg swelling.   Gastrointestinal: Negative.  Negative for abdominal distention, abdominal pain, diarrhea, nausea and vomiting.   Endocrine: Negative.  Negative for cold intolerance and heat intolerance.   Genitourinary: Negative.  Negative for dysuria, frequency and hematuria.   Musculoskeletal: Negative.  Negative for arthralgias, back pain, myalgias and neck pain.   Skin: Negative.  Negative for pallor and rash.   Allergic/Immunologic: Negative.    Neurological: Negative.  Negative for dizziness, seizures, syncope, weakness and headaches.   Hematological: Negative.    Psychiatric/Behavioral: Negative.  Negative for agitation, confusion and sleep disturbance.    Objective:     Vital Signs (Most Recent):  Temp: 97.9 °F (36.6 °C) (07/31/22 1118)  Pulse: 67 (07/31/22 1532)  Resp: 18 (07/31/22 1340)  BP: 129/68 (07/31/22 1118)  SpO2: 96 % (07/31/22 1340) Vital Signs (24h Range):  Temp:  [97.9 °F (36.6 °C)-100 °F (37.8 °C)] 97.9 °F (36.6 °C)  Pulse:  [63-87] 67  Resp:  [12-34] 18  SpO2:  [86 %-99 %] 96 %  BP: (113-151)/(58-99) 129/68     Weight: 89.8 kg (198 lb)  Body mass index is 32.95 kg/m².    Intake/Output Summary (Last 24 hours) at 7/31/2022 1537  Last data filed at 7/31/2022 1335  Gross per 24 hour   Intake 50 ml   Output 1200 ml   Net -1150 ml      Physical Exam  Vitals and nursing note reviewed.   Constitutional:       Appearance: He  is well-developed.   HENT:      Head: Normocephalic and atraumatic.   Eyes:      General:         Right eye: No discharge.         Left eye: No discharge.      Conjunctiva/sclera: Conjunctivae normal.   Neck:      Vascular: No JVD.   Cardiovascular:      Rate and Rhythm: Normal rate and regular rhythm.      Heart sounds: Normal heart sounds. No murmur heard.    No friction rub. No gallop.   Pulmonary:      Effort: Pulmonary effort is normal. No respiratory distress.      Breath sounds: Examination of the right-lower field reveals rales. Examination of the left-lower field reveals rales. Rales present. No wheezing.   Chest:      Chest wall: No tenderness.   Abdominal:      General: Bowel sounds are decreased. There is distension.      Palpations: Abdomen is soft. There is no mass.      Tenderness: There is abdominal tenderness. There is no right CVA tenderness, left CVA tenderness, guarding or rebound.      Hernia: No hernia is present.   Musculoskeletal:         General: No tenderness. Normal range of motion.      Cervical back: Normal range of motion.   Skin:     General: Skin is warm and dry.   Neurological:      Mental Status: He is alert and oriented to person, place, and time.   Psychiatric:         Judgment: Judgment normal.       Significant Labs: All pertinent labs within the past 24 hours have been reviewed.  CBC:   Recent Labs   Lab 07/30/22 2232   WBC 9.04   HGB 10.1*   HCT 31.4*        CMP:   Recent Labs   Lab 07/30/22 2232 07/31/22  0634    142   K 3.7 3.8    106   CO2 28 28   * 152*   BUN 15 19   CREATININE 1.1 1.1   CALCIUM 8.5* 8.0*   PROT 6.4 5.9*   ALBUMIN 2.6* 2.6*   BILITOT 0.5 0.5   ALKPHOS 73 68   AST 15 13   ALT 20 18   ANIONGAP 7* 8   EGFRNONAA >60 >60     Cardiac Markers:   Recent Labs   Lab 07/30/22 2232   *     Troponin:   Recent Labs   Lab 07/30/22 2232 07/31/22  0634 07/31/22  0925   TROPONINI 0.155* 0.151* 0.138*       Significant Imaging:      Imaging Results               X-Ray Chest AP Portable (Final result)  Result time 07/30/22 23:02:37      Final result by Gato Hebert MD (07/30/22 23:02:37)                   Impression:      Left basilar opacity concerning for atelectasis or infection.  Correlation is advised.    This report was flagged in Epic as abnormal.      Electronically signed by: Gato Hebert  Date:    07/30/2022  Time:    23:02               Narrative:    EXAMINATION:  XR CHEST AP PORTABLE    CLINICAL HISTORY:  Chest Pain;    TECHNIQUE:  Single frontal view of the chest was performed.    COMPARISON:  Multiple priors.    FINDINGS:  Left basilar opacity concerning for infection or atelectasis.  Correlation is advised.  Otherwise the lungs appear clear.    The cardiac silhouette is normal in size. The hilar and mediastinal contours are unremarkable.    Left shoulder arthroplasty.

## 2022-07-31 NOTE — HPI
Crow Green is a 67 y.o. male patient with a PMHx of asthma, atrial fibrillation with RVR, CHF, chronic obstructive pulmonary disease, DM, elevated PSA, HTN, MI who presents to the Emergency Department for evaluation of mid-sternal chest pain which onset gradually yesterday. Pt also c/o a cough and feeling weak. Symptoms are constant and moderate in severity. No mitigating or exacerbating factors reported. Associated sxs include weakness, cough. Patient denies any n/v/d, fever, chills, and all other sxs at this time.     In the ED O2 sats 86%, RR 30, CBC unremarkable, , Troponin 0.155, CXR showed Left basilar opacity concerning for atelectasis or infection.     Hospital medicine was called and the pt was placed in observation with telemetry monitoring. Pt is a full code - surrogate decision maker is his sister, Gretchen Green.

## 2022-07-31 NOTE — PLAN OF CARE
Plan of Care: RN Shift Summary & Bedside Handover Report  07/31/2022 5:29 PM    Pt admitted on 7/30/2022 for Acute on chronic respiratory failure with hypoxia under Dakota Francis MD service   Code Status Full Code    Past Medical/ Surgical Hx Past Medical History:   Diagnosis Date    Arthritis     Asthma     Atrial fibrillation     Atrial fibrillation with RVR 8/7/2019    CHF (congestive heart failure)     Chronic obstructive pulmonary disease 8/5/2020    Depression     Dermatomyositis     Diabetes mellitus, type 2 Diagnosed in 2000    Elevated PSA     Follow up 7/30/2020    Hypertension     Myocardial infarction     2017    Sleep apnea       Past Surgical History:   Procedure Laterality Date    ABLATION OF ARRHYTHMOGENIC FOCUS FOR ATRIAL FIBRILLATION N/A 2/27/2020    Procedure: Ablation atrial fibrillation;  Surgeon: Gio Brown MD;  Location: [facility] EP LAB;  Service: Cardiology;  Laterality: N/A;  afib, DAMIEN (cx if SR), PVI, PITO, anes, MB, 3 Prep    CHOLECYSTECTOMY      CLOSURE OF WOUND Left 7/1/2020    Procedure: CLOSURE, WOUND;  Surgeon: Barb Veras DPM;  Location: [facility] OR;  Service: Podiatry;  Laterality: Left;    COLONOSCOPY N/A 3/4/2022    Procedure: COLONOSCOPY;  Surgeon: Yolis Freitas MD;  Location: [facility] ENDO;  Service: Endoscopy;  Laterality: N/A;    ESOPHAGOGASTRODUODENOSCOPY N/A 3/4/2022    Procedure: EGD (ESOPHAGOGASTRODUODENOSCOPY);  Surgeon: Yolis Freitas MD;  Location: [facility] ENDO;  Service: Endoscopy;  Laterality: N/A;    INJECTION OF ANESTHETIC AGENT INTO SACROILIAC JOINT Left 3/24/2021    Procedure: Left BLOCK, SACROILIAC JOINT and bilateral rhomboid TPI;  Surgeon: Dillan Tsai MD;  Location: [facility] PAIN MGT;  Service: Pain Management;  Laterality: Left;    INTRALUMINAL GASTROINTESTINAL TRACT IMAGING VIA CAPSULE N/A 3/22/2022    Procedure: IMAGING PROCEDURE, GI TRACT, INTRALUMINAL, VIA CAPSULE;  Surgeon: Justice Martinez RN;  Location: [facility] ENDO;  Service: Endoscopy;   Laterality: N/A;    REVERSE TOTAL SHOULDER ARTHROPLASTY Left 1/10/2022    Procedure: ARTHROPLASTY, SHOULDER, TOTAL, REVERSE;  Surgeon: Anthony Alvarado MD;  Location: [facility] OR;  Service: Orthopedics;  Laterality: Left;  left reverse total shoulder arthroplasty     SELECTIVE INJECTION OF ANESTHETIC AGENT AROUND LUMBAR SPINAL NERVE ROOT BY TRANSFORAMINAL APPROACH Left 6/11/2020    Procedure: BLOCK, SPINAL NERVE ROOT, LUMBAR, SELECTIVE, TRANSFORAMINAL APPROACH Left L4-5, L5-S1 TFESI with RN IV sedation;  Surgeon: Dillan Tsai MD;  Location: [facility] PAIN MGT;  Service: Pain Management;  Laterality: Left;    TOE AMPUTATION Left 6/29/2020    Procedure: AMPUTATION, TOE;  Surgeon: Barb Veras DPM;  Location: [facility] OR;  Service: Podiatry;  Laterality: Left;  great toe       HEENT HEENT WDL: WDL except, vision aid   Cognitive/ Neuro Cognitive/Neuro/Behavioral WDL: WDL  Level of Consciousness (AVPU): alert            Resp Respiratory WDL: WDL except, breath sounds, rhythm/pattern  All Lung Fields Breath Sounds: Anterior:, Lateral:, diminished (slco)  Flow (L/min): 2  Oxygen Concentration (%): 28  O2 Device (Oxygen Therapy): nasal cannula   Cardiac Cardiac WDL: WDL except  Lead Monitored: Lead II  Rhythm: conduction defect     Cardiac/Telemetry Box Number: [id number]   Peripheral/ Neurovascular Peripheral Neurovascular WDL: WDL   VTE core VTE Required Core Measure: Pharmacological prophylaxis initiated/maintained   GI GI WDL: WDL     Last Bowel Movement: 07/30/22   Diet Diet Low Sodium, 2gm [facility]; Fluid - 1500mL    Genitourinary WDL: WDL         Skin Skin WDL: WDL      Collin Score Collin Score: 20   Musculoskeletal  Musculoskeletal WDL: WDL except, mobility, joint(s)  General Mobility: moderately impaired   Safety Safety WDL: WDL  Safety Factors: ID band on, upper side rails raised x 2, call light in reach, wheels locked, bed in low position      Fall Risk Score Fall Risk Score: 12   LDA(s) Lines/Drains/Airways        Peripheral Intravenous Line  Duration                  Peripheral IV - Single Lumen 20 G Left Forearm -- days                   Consults Consults (From admission, onward)          Status Ordering Provider     Inpatient consult to Social Work/Case Management  Once        Provider:  (Not yet assigned)    Acknowledged BETTYE DUCKWORTH     Inpatient consult to Registered Dietitian/Nutritionist  Once        Provider:  (Not yet assigned)    Acknowledged BETTYE DUCKWORTH     Inpatient consult to Cardiology  Once        Provider:  Franklin Drake MD    Completed MAIA KEITA            Shift events Uneventful, respiratory sample sent, echo completed,    Plan of Care for RN report  Continue care as ordered, respiratory management   Discharge Planning   TBD   Patient updated on plan of care, all questions answered up to now regarding plan of care, will continue to monitor per hourly rounding & unit practice/ policy    Note: Other exception details noted in flowsheet if not present in summary

## 2022-07-31 NOTE — ASSESSMENT & PLAN NOTE
Diurese with IV Lasix 40 mg Q 12 hours for now.  - Continue BB   - Strict I&Os, daily weights, Na/fluid restriction.  - Cardiology following

## 2022-07-31 NOTE — PROGRESS NOTES
[facility]  [facility] Medicine  Progress Note    Patient Name: Crow Green  MRN: [id number]  Patient Class: OP- Observation   Admission Date: 7/30/2022  Length of Stay: 0 days  Attending Physician: Dakota rFancis MD  Primary Care Provider: Eunice Mora NP        Subjective:     Principal Problem:Acute on chronic respiratory failure with hypoxia        HPI:  Crow Green is a 67 y.o. male patient with a PMHx of asthma, atrial fibrillation with RVR, CHF, chronic obstructive pulmonary disease, DM, elevated PSA, HTN, MI who presents to the Emergency Department for evaluation of mid-sternal chest pain which onset gradually yesterday. Pt also c/o a cough and feeling weak. Symptoms are constant and moderate in severity. No mitigating or exacerbating factors reported. Associated sxs include weakness, cough. Patient denies any n/v/d, fever, chills, and all other sxs at this time.     In the ED O2 sats 86%, RR 30, CBC unremarkable, , Troponin 0.155, CXR showed Left basilar opacity concerning for atelectasis or infection.     Hospital medicine was called and the pt was placed in observation with telemetry monitoring. Pt is a full code - surrogate decision maker is his sister, Gretchen Green.          Overview/Hospital Course:  Mr Green is a 67 year old male who presented to [facility] for evaluation of chest pain. Associated symptoms include cough and shortness of breath. O 2 sats 86 % in the ED, he was placed on supplemental O 2.  , he has been started on Lasix IV. Patient was also started on IV antibiotic for concerning for athelectasis vs infection on chest X ray.       Interval History: Started on Lasix IV this. Will monitor response. Cardiology following.     Review of Systems   Constitutional:  Positive for fatigue. Negative for chills, diaphoresis and fever.   HENT: Negative.  Negative for congestion, ear discharge, rhinorrhea, sinus pressure, sore throat and  trouble swallowing.    Eyes: Negative.  Negative for discharge and visual disturbance.   Respiratory:  Positive for cough and shortness of breath. Negative for apnea, choking, chest tightness, wheezing and stridor.    Cardiovascular:  Positive for chest pain. Negative for palpitations and leg swelling.   Gastrointestinal: Negative.  Negative for abdominal distention, abdominal pain, diarrhea, nausea and vomiting.   Endocrine: Negative.  Negative for cold intolerance and heat intolerance.   Genitourinary: Negative.  Negative for dysuria, frequency and hematuria.   Musculoskeletal: Negative.  Negative for arthralgias, back pain, myalgias and neck pain.   Skin: Negative.  Negative for pallor and rash.   Allergic/Immunologic: Negative.    Neurological: Negative.  Negative for dizziness, seizures, syncope, weakness and headaches.   Hematological: Negative.    Psychiatric/Behavioral: Negative.  Negative for agitation, confusion and sleep disturbance.    Objective:     Vital Signs (Most Recent):  Temp: 97.9 °F (36.6 °C) (07/31/22 1118)  Pulse: 67 (07/31/22 1532)  Resp: 18 (07/31/22 1340)  BP: 129/68 (07/31/22 1118)  SpO2: 96 % (07/31/22 1340) Vital Signs (24h Range):  Temp:  [97.9 °F (36.6 °C)-100 °F (37.8 °C)] 97.9 °F (36.6 °C)  Pulse:  [63-87] 67  Resp:  [12-34] 18  SpO2:  [86 %-99 %] 96 %  BP: (113-151)/(58-99) 129/68     Weight: 89.8 kg (198 lb)  Body mass index is 32.95 kg/m².    Intake/Output Summary (Last 24 hours) at 7/31/2022 1537  Last data filed at 7/31/2022 1335  Gross per 24 hour   Intake 50 ml   Output 1200 ml   Net -1150 ml      Physical Exam  Vitals and nursing note reviewed.   Constitutional:       Appearance: He is well-developed.   HENT:      Head: Normocephalic and atraumatic.   Eyes:      General:         Right eye: No discharge.         Left eye: No discharge.      Conjunctiva/sclera: Conjunctivae normal.   Neck:      Vascular: No JVD.   Cardiovascular:      Rate and Rhythm: Normal rate and regular  rhythm.      Heart sounds: Normal heart sounds. No murmur heard.    No friction rub. No gallop.   Pulmonary:      Effort: Pulmonary effort is normal. No respiratory distress.      Breath sounds: Examination of the right-lower field reveals rales. Examination of the left-lower field reveals rales. Rales present. No wheezing.   Chest:      Chest wall: No tenderness.   Abdominal:      General: Bowel sounds are decreased. There is distension.      Palpations: Abdomen is soft. There is no mass.      Tenderness: There is abdominal tenderness. There is no right CVA tenderness, left CVA tenderness, guarding or rebound.      Hernia: No hernia is present.   Musculoskeletal:         General: No tenderness. Normal range of motion.      Cervical back: Normal range of motion.   Skin:     General: Skin is warm and dry.   Neurological:      Mental Status: He is alert and oriented to person, place, and time.   Psychiatric:         Judgment: Judgment normal.       Significant Labs: All pertinent labs within the past 24 hours have been reviewed.  CBC:   Recent Labs   Lab 07/30/22 2232   WBC 9.04   HGB 10.1*   HCT 31.4*        CMP:   Recent Labs   Lab 07/30/22 2232 07/31/22  0634    142   K 3.7 3.8    106   CO2 28 28   * 152*   BUN 15 19   CREATININE 1.1 1.1   CALCIUM 8.5* 8.0*   PROT 6.4 5.9*   ALBUMIN 2.6* 2.6*   BILITOT 0.5 0.5   ALKPHOS 73 68   AST 15 13   ALT 20 18   ANIONGAP 7* 8   EGFRNONAA >60 >60     Cardiac Markers:   Recent Labs   Lab 07/30/22 2232   *     Troponin:   Recent Labs   Lab 07/30/22 2232 07/31/22  0634 07/31/22  0925   TROPONINI 0.155* 0.151* 0.138*       Significant Imaging:     Imaging Results               X-Ray Chest AP Portable (Final result)  Result time 07/30/22 23:02:37      Final result by Gato Hebert MD (07/30/22 23:02:37)                   Impression:      Left basilar opacity concerning for atelectasis or infection.  Correlation is advised.    This report was  flagged in Epic as abnormal.      Electronically signed by: Gato Hebert  Date:    07/30/2022  Time:    23:02               Narrative:    EXAMINATION:  XR CHEST AP PORTABLE    CLINICAL HISTORY:  Chest Pain;    TECHNIQUE:  Single frontal view of the chest was performed.    COMPARISON:  Multiple priors.    FINDINGS:  Left basilar opacity concerning for infection or atelectasis.  Correlation is advised.  Otherwise the lungs appear clear.    The cardiac silhouette is normal in size. The hilar and mediastinal contours are unremarkable.    Left shoulder arthroplasty.                                         Assessment/Plan:      * Acute on chronic respiratory failure with hypoxia  Treat underlying cause PNA vs Volume overload vs COPD    Supplemental oxygen        Acute on chronic combined systolic and diastolic heart failure   Diurese with IV Lasix 40 mg Q 12 hours for now.  - Continue BB   - Strict I&Os, daily weights, Na/fluid restriction.  - Cardiology following         Pneumonia  Less likely given normal WBCs and afebrile   Procalcitonin pending   Blood and sputum cx   IV rocephin and azithromycin empirically       Chronic obstructive pulmonary disease  Patient's COPD is with exacerbation noted by continued dyspnea currently.  Patient is currently off COPD Pathway. Continue scheduled inhalers Antibiotics and Supplemental oxygen and monitor respiratory status closely.     Resume home meds   Supplemental oxygen as needed       Elevated troponin  Denies chest pain - EKG unrevealing  Troponin at 0.155 in ED - stable at baseline  Continue home meds as appropriate  Continuous cardiac monitoring  ECHO LVEF 40-45%      PAF (paroxysmal atrial fibrillation)  Currently sinus arrythmia - rate controlled  Continue home medications as appropriate  Continue eliquis  Continuous cardiac monitoring         Other chest pain  Cardiology consult  Serial cardiac enzymes, beta blocker, ASA, ACE   Nitrates prn, Oxygen therapy to maintain  sats > 92 %  Lipid panel,  2 D ECHO            SANDRITA on CPAP  CPAP qhs       Essential hypertension  Resume home meds   Bp stable         VTE Risk Mitigation (From admission, onward)         Ordered     apixaban tablet 5 mg  2 times daily         07/31/22 0322     IP VTE HIGH RISK PATIENT  Once         07/31/22 0322     Place sequential compression device  Until discontinued         07/31/22 0322     Reason for No Pharmacological VTE Prophylaxis  Once        Question:  Reasons:  Answer:  Already adequately anticoagulated on oral Anticoagulants    07/31/22 0322                Discharge Planning   REJI:      Code Status: Full Code   Is the patient medically ready for discharge?:     Reason for patient still in hospital (select all that apply): Patient trending condition and Treatment                     Arnol Xie NP  Department of Hospital Medicine   [facility] Telemetry ([facility])

## 2022-07-31 NOTE — H&P
[facility] Medicine  History & Physical    Patient Name: Crow Green  MRN: [id number]  Patient Class: OP- Observation  Admission Date: 7/30/2022  Attending Physician: Jose Baumann MD   Primary Care Provider: Eunice Mora NP         Patient information was obtained from patient and ER records.     Subjective:     Principal Problem:Acute on chronic respiratory failure with hypoxia    Chief Complaint:   Chief Complaint   Patient presents with    Chest Pain     Pt. Presents to ED via [facility] due to having mid-sternal chest pain since yesterday, along with a cough, and feeling weak also since yesterday.         HPI: Crow Green is a 67 y.o. male patient with a PMHx of asthma, atrial fibrillation with RVR, CHF, chronic obstructive pulmonary disease, DM, elevated PSA, HTN, MI who presents to the Emergency Department for evaluation of mid-sternal chest pain which onset gradually yesterday. Pt also c/o a cough and feeling weak. Symptoms are constant and moderate in severity. No mitigating or exacerbating factors reported. Associated sxs include weakness, cough. Patient denies any n/v/d, fever, chills, and all other sxs at this time.     In the ED O2 sats 86%, RR 30, CBC unremarkable, , Troponin 0.155, CXR showed Left basilar opacity concerning for atelectasis or infection.     Hospital medicine was called and the pt was placed in observation with telemetry monitoring. Pt is a full code - surrogate decision maker is his sister, Gretchen Green.          Past Medical History:   Diagnosis Date    Arthritis     Asthma     Atrial fibrillation     Atrial fibrillation with RVR 8/7/2019    CHF (congestive heart failure)     Chronic obstructive pulmonary disease 8/5/2020    Depression     Dermatomyositis     Diabetes mellitus, type 2 Diagnosed in 2000    Elevated PSA     Follow up 7/30/2020    Hypertension     Myocardial infarction     2017    Sleep apnea         Past Surgical History:   Procedure Laterality Date    ABLATION OF ARRHYTHMOGENIC FOCUS FOR ATRIAL FIBRILLATION N/A 2/27/2020    Procedure: Ablation atrial fibrillation;  Surgeon: Gio Brown MD;  Location: [facility] EP LAB;  Service: Cardiology;  Laterality: N/A;  afib, DAMIEN (cx if SR), PVI, PITO, anes, MB, 3 Prep    CHOLECYSTECTOMY      CLOSURE OF WOUND Left 7/1/2020    Procedure: CLOSURE, WOUND;  Surgeon: Barb Veras DPM;  Location: [facility] OR;  Service: Podiatry;  Laterality: Left;    COLONOSCOPY N/A 3/4/2022    Procedure: COLONOSCOPY;  Surgeon: Yolis Freitas MD;  Location: [facility] ENDO;  Service: Endoscopy;  Laterality: N/A;    ESOPHAGOGASTRODUODENOSCOPY N/A 3/4/2022    Procedure: EGD (ESOPHAGOGASTRODUODENOSCOPY);  Surgeon: Yolis Freitas MD;  Location: [facility] ENDO;  Service: Endoscopy;  Laterality: N/A;    INJECTION OF ANESTHETIC AGENT INTO SACROILIAC JOINT Left 3/24/2021    Procedure: Left BLOCK, SACROILIAC JOINT and bilateral rhomboid TPI;  Surgeon: Dillan Tsai MD;  Location: [facility] PAIN MGT;  Service: Pain Management;  Laterality: Left;    INTRALUMINAL GASTROINTESTINAL TRACT IMAGING VIA CAPSULE N/A 3/22/2022    Procedure: IMAGING PROCEDURE, GI TRACT, INTRALUMINAL, VIA CAPSULE;  Surgeon: Justice Martinez RN;  Location: [facility] ENDO;  Service: Endoscopy;  Laterality: N/A;    REVERSE TOTAL SHOULDER ARTHROPLASTY Left 1/10/2022    Procedure: ARTHROPLASTY, SHOULDER, TOTAL, REVERSE;  Surgeon: Anthony Alvarado MD;  Location: [facility];  Service: Orthopedics;  Laterality: Left;  left reverse total shoulder arthroplasty     SELECTIVE INJECTION OF ANESTHETIC AGENT AROUND LUMBAR SPINAL NERVE ROOT BY TRANSFORAMINAL APPROACH Left 6/11/2020    Procedure: BLOCK, SPINAL NERVE ROOT, LUMBAR, SELECTIVE, TRANSFORAMINAL APPROACH Left L4-5, L5-S1 TFESI with RN IV sedation;  Surgeon: Dillan Tsai MD;  Location: [facility] PAIN MGT;  Service: Pain Management;  Laterality: Left;    TOE AMPUTATION Left 6/29/2020     Procedure: AMPUTATION, TOE;  Surgeon: Barb Veras DPM;  Location: [facility];  Service: Podiatry;  Laterality: Left;  great toe       Review of patient's allergies indicates:   Allergen Reactions    Protamine Hives     Urticaria, possible upper airway swelling       No current facility-administered medications on file prior to encounter.     Current Outpatient Medications on File Prior to Encounter   Medication Sig    acetaminophen (TYLENOL) 500 MG tablet Take 2 tablets (1,000 mg total) by mouth every 8 (eight) hours as needed for Pain.    albuterol (PROVENTIL) 2.5 mg /3 mL (0.083 %) nebulizer solution Take 3 mLs (2.5 mg total) by nebulization every 4 to 6 hours as needed for Wheezing or Shortness of Breath.    allopurinoL (ZYLOPRIM) 100 MG tablet Take 1 tablet (100 mg total) by mouth once daily.    amLODIPine (NORVASC) 2.5 MG tablet Take 1 tablet (2.5 mg total) by mouth every evening.    aspirin (ECOTRIN) 81 MG EC tablet Take 1 tablet (81 mg total) by mouth once daily.    atorvastatin (LIPITOR) 40 MG tablet TAKE 1 TABLET BY MOUTH EVERY EVENING    azaTHIOprine (IMURAN) 50 mg Tab Take 1 tablet (50 mg total) by mouth once daily.    baclofen (LIORESAL) 10 MG tablet Take 1 tablet (10 mg total) by mouth once daily.    blood sugar diagnostic Strp 1 each by Misc.(Non-Drug; Combo Route) route 4 (four) times daily.    blood-glucose meter (TRUE METRIX GLUCOSE METER) kit Use as instructed to test blood glucose meter daily.    ELIQUIS 5 mg Tab Take 1 tablet (5 mg total) by mouth 2 (two) times daily.    fluticasone-salmeterol diskus inhaler 250-50 mcg Inhale 1 puff into the lungs 2 (two) times daily. Controller    furosemide (LASIX) 40 MG tablet Take 1 tablet (40 mg total) by mouth once daily.    gabapentin (NEURONTIN) 800 MG tablet Take 1 tablet (800 mg total) by mouth 3 (three) times daily.    HYDROcodone-acetaminophen (NORCO) 7.5-325 mg per tablet SMARTSI Tablet(s) By Mouth Every 12 Hours PRN    insulin  aspart U-100 (NOVOLOG FLEXPEN U-100 INSULIN) 100 unit/mL (3 mL) InPn pen INJECT 10 UNITS SUBCUTANEOUSLY THREE TIMES DAILY WITH MEALS    insulin degludec (TRESIBA FLEXTOUCH U-200) 200 unit/mL (3 mL) insulin pen Inject 40 Units into the skin once daily. (Patient taking differently: Inject 38 Units into the skin once daily.)    ipratropium (ATROVENT) 0.02 % nebulizer solution INHALE THE CONTENTS OF 1 VIAL VIA NEBULIZER FOUR TIMES DAILY    isosorbide mononitrate (IMDUR) 60 MG 24 hr tablet Take 1 tablet (60 mg total) by mouth 2 (two) times a day.    JARDIANCE 25 mg tablet TAKE 1 TABLET(25 MG) BY MOUTH EVERY DAY    latanoprost 0.005 % ophthalmic solution INSTILL 1 DROP INTO BOTH EYES ONE TIME DAILY    lisinopriL 10 MG tablet Take 1 tablet (10 mg total) by mouth once daily.    metoprolol succinate (TOPROL-XL) 50 MG 24 hr tablet Take 1 tablet (50 mg total) by mouth every evening.    MICRO THIN LANCETS 33 gauge Misc     OZEMPIC 1 mg/dose (2 mg/1.5 mL) PnIj Inject 1 mg into the skin every 7 days.    pantoprazole (PROTONIX) 40 MG tablet Take 1 tablet (40 mg total) by mouth once daily.    ranolazine (RANEXA) 1,000 mg Tb12 Take 1 tablet (1,000 mg total) by mouth 2 (two) times daily.    sertraline (ZOLOFT) 100 MG tablet Take 1 tablet (100 mg total) by mouth every evening.    tacrolimus (PROTOPIC) 0.1 % ointment Apply topically 2 (two) times daily.    tamsulosin (FLOMAX) 0.4 mg Cap Take 1 capsule (0.4 mg total) by mouth once daily.    tiotropium (SPIRIVA WITH HANDIHALER) 18 mcg inhalation capsule INHALE THE CONTENTS OF 1 CAPSULE ONE TIME DAILY (CONTROLLER)    traMADoL (ULTRAM) 50 mg tablet Take 1 tablet (50 mg total) by mouth every 8 (eight) hours as needed for Pain.    traZODone (DESYREL) 100 MG tablet Take 2 tablets (200 mg total) by mouth every evening.    triamcinolone acetonide 0.1% (KENALOG) 0.1 % ointment Apply topically 2 (two) times daily. Use on legs and back    VIStratusLIVE AEROSOL DELIVERY SYSTEM Shefali USE AS  DIRESTED    [DISCONTINUED] diclofenac sodium (VOLTAREN) 1 % Gel APPLY 2 GRAMS TOPICALLY FOUR TIMES DAILY    [DISCONTINUED] nitroGLYCERIN (NITROSTAT) 0.4 MG SL tablet Place 1 tablet (0.4 mg total) under the tongue every 5 (five) minutes as needed for Chest pain.     Family History       Problem Relation (Age of Onset)    Cancer Maternal Grandfather    Cataracts Mother, Father, Sister    Diabetes Mother, Sister, Maternal Aunt    Glaucoma Mother, Sister, Maternal Aunt    No Known Problems Brother, Daughter, Son    Stroke Father, Sister          Tobacco Use    Smoking status: Never Smoker    Smokeless tobacco: Never Used   Substance and Sexual Activity    Alcohol use: Yes     Comment: rare, maybe holidays    Drug use: Not Currently     Types: Cocaine    Sexual activity: Not Currently     Partners: Female     Review of Systems   Constitutional:  Positive for fatigue.   HENT: Negative.     Eyes: Negative.    Respiratory:  Positive for cough and shortness of breath.    Cardiovascular:  Positive for chest pain.   Gastrointestinal: Negative.    Endocrine: Negative.    Genitourinary: Negative.    Musculoskeletal: Negative.    Skin: Negative.    Allergic/Immunologic: Negative.    Neurological: Negative.    Hematological: Negative.    Psychiatric/Behavioral: Negative.     Objective:     Vital Signs (Most Recent):  Temp: 98.3 °F (36.8 °C) (07/31/22 0320)  Pulse: 73 (07/31/22 0320)  Resp: (!) 24 (07/31/22 0320)  BP: 136/72 (07/31/22 0320)  SpO2: (!) 93 % (07/31/22 0320)   Vital Signs (24h Range):  Temp:  [98.3 °F (36.8 °C)-100 °F (37.8 °C)] 98.3 °F (36.8 °C)  Pulse:  [73-87] 73  Resp:  [16-34] 24  SpO2:  [86 %-99 %] 93 %  BP: (113-151)/(58-99) 136/72     Weight: 90 kg (198 lb 6.6 oz)  Body mass index is 33.02 kg/m².    Physical Exam  Vitals and nursing note reviewed.   Constitutional:       Appearance: Normal appearance. He is well-developed.      Interventions: Nasal cannula in place.   HENT:      Head: Normocephalic and  atraumatic.   Eyes:      Pupils: Pupils are equal, round, and reactive to light.   Cardiovascular:      Rate and Rhythm: Normal rate and regular rhythm.      Pulses: Normal pulses.      Heart sounds: Normal heart sounds.   Pulmonary:      Effort: Pulmonary effort is normal. Tachypnea present. No accessory muscle usage or respiratory distress.      Breath sounds: Decreased breath sounds and wheezing present.   Abdominal:      General: Bowel sounds are normal.      Palpations: Abdomen is soft.      Tenderness: There is no abdominal tenderness.   Musculoskeletal:         General: Normal range of motion.      Cervical back: Normal range of motion and neck supple.   Skin:     General: Skin is warm and dry.      Capillary Refill: Capillary refill takes less than 2 seconds.   Neurological:      Mental Status: He is alert and oriented to person, place, and time.      Deep Tendon Reflexes: Reflexes are normal and symmetric.   Psychiatric:         Speech: Speech normal.         Behavior: Behavior normal.         Thought Content: Thought content normal.         Judgment: Judgment normal.        Significant Labs:   Results for orders placed or performed during the hospital encounter of 07/30/22   CBC auto differential   Result Value Ref Range    WBC 9.04 3.90 - 12.70 K/uL    RBC 3.72 (L) 4.60 - 6.20 M/uL    Hemoglobin 10.1 (L) 14.0 - 18.0 g/dL    Hematocrit 31.4 (L) 40.0 - 54.0 %    MCV 84 82 - 98 fL    MCH 27.2 27.0 - 31.0 pg    MCHC 32.2 32.0 - 36.0 g/dL    RDW 17.9 (H) 11.5 - 14.5 %    Platelets 159 150 - 450 K/uL    MPV 9.3 9.2 - 12.9 fL    Immature Granulocytes 0.3 0.0 - 0.5 %    Gran # (ANC) 6.6 1.8 - 7.7 K/uL    Immature Grans (Abs) 0.03 0.00 - 0.04 K/uL    Lymph # 1.6 1.0 - 4.8 K/uL    Mono # 0.8 0.3 - 1.0 K/uL    Eos # 0.1 0.0 - 0.5 K/uL    Baso # 0.01 0.00 - 0.20 K/uL    nRBC 0 0 /100 WBC    Gran % 72.9 38.0 - 73.0 %    Lymph % 17.3 (L) 18.0 - 48.0 %    Mono % 8.8 4.0 - 15.0 %    Eosinophil % 0.6 0.0 - 8.0 %     Basophil % 0.1 0.0 - 1.9 %    Differential Method Automated    Comprehensive metabolic panel   Result Value Ref Range    Sodium 142 136 - 145 mmol/L    Potassium 3.7 3.5 - 5.1 mmol/L    Chloride 107 95 - 110 mmol/L    CO2 28 23 - 29 mmol/L    Glucose 143 (H) 70 - 110 mg/dL    BUN 15 8 - 23 mg/dL    Creatinine 1.1 0.5 - 1.4 mg/dL    Calcium 8.5 (L) 8.7 - 10.5 mg/dL    Total Protein 6.4 6.0 - 8.4 g/dL    Albumin 2.6 (L) 3.5 - 5.2 g/dL    Total Bilirubin 0.5 0.1 - 1.0 mg/dL    Alkaline Phosphatase 73 55 - 135 U/L    AST 15 10 - 40 U/L    ALT 20 10 - 44 U/L    Anion Gap 7 (L) 8 - 16 mmol/L    eGFR if African American >60 >60 mL/min/1.73 m^2    eGFR if non African American >60 >60 mL/min/1.73 m^2   Urinalysis - Clean Catch   Result Value Ref Range    Specimen UA Urine, Clean Catch     Color, UA Yellow Yellow, Straw, Angela    Appearance, UA Clear Clear    pH, UA 7.0 5.0 - 8.0    Specific Gravity, UA 1.025 1.005 - 1.030    Protein, UA 2+ (A) Negative    Glucose, UA 4+ (A) Negative    Ketones, UA Negative Negative    Bilirubin (UA) Negative Negative    Occult Blood UA Negative Negative    Nitrite, UA Negative Negative    Urobilinogen, UA Negative <2.0 EU/dL    Leukocytes, UA Negative Negative   B-Type natriuretic peptide (BNP)   Result Value Ref Range     (H) 0 - 99 pg/mL   Troponin I   Result Value Ref Range    Troponin I 0.155 (H) 0.000 - 0.026 ng/mL   Urinalysis Microscopic   Result Value Ref Range    RBC, UA 0 0 - 4 /hpf    WBC, UA 0 0 - 5 /hpf    Bacteria None None-Occ /hpf    Yeast, UA None None    Hyaline Casts, UA 0 0-1/lpf /lpf    Microscopic Comment SEE COMMENT    POCT COVID-19 Rapid Screening   Result Value Ref Range    POC Rapid COVID Negative Negative     Acceptable Yes      *Note: Due to a large number of results and/or encounters for the requested time period, some results have not been displayed. A complete set of results can be found in Results Review.        Significant Imaging:    Imaging Results               X-Ray Chest AP Portable (Final result)  Result time 07/30/22 23:02:37      Final result by Gato Hebert MD (07/30/22 23:02:37)                   Impression:      Left basilar opacity concerning for atelectasis or infection.  Correlation is advised.    This report was flagged in Epic as abnormal.      Electronically signed by: Gato Hebert  Date:    07/30/2022  Time:    23:02               Narrative:    EXAMINATION:  XR CHEST AP PORTABLE    CLINICAL HISTORY:  Chest Pain;    TECHNIQUE:  Single frontal view of the chest was performed.    COMPARISON:  Multiple priors.    FINDINGS:  Left basilar opacity concerning for infection or atelectasis.  Correlation is advised.  Otherwise the lungs appear clear.    The cardiac silhouette is normal in size. The hilar and mediastinal contours are unremarkable.    Left shoulder arthroplasty.                                       Assessment/Plan:     * Acute on chronic respiratory failure with hypoxia  Treat underlying cause PNA vs Volume overload vs COPD    Supplemental oxygen        Pneumonia  Less likely given normal WBCs and afebrile   Procalcitonin pending   Blood and sputum cx   IV rocephin and azithromycin empirically       Chronic obstructive pulmonary disease  Patient's COPD is with exacerbation noted by continued dyspnea currently.  Patient is currently off COPD Pathway. Continue scheduled inhalers Antibiotics and Supplemental oxygen and monitor respiratory status closely.     Resume home meds   Supplemental oxygen as needed       Elevated troponin  Denies chest pain - EKG unrevealing  Troponin at 0.155 in ED - stable at baseline  Continue home meds as appropriate  Continuous cardiac monitoring  ECHO pending      PAF (paroxysmal atrial fibrillation)  Currently sinus arrythmia - rate controlled  Continue home medications as appropriate  Continue eliquis  Continuous cardiac monitoring         Other chest pain  Cardiology consult  Serial  cardiac enzymes, beta blocker, ASA, ACE   Nitrates prn, Oxygen therapy to maintain sats > 92 %  Lipid panel,  2 D ECHO            SANDRITA on CPAP  CPAP qhs       Acute on chronic combined systolic and diastolic heart failure   Diurese with IV Lasix 40 mg Q 12 hours for now.  - Continue BB   - Strict I&Os, daily weights, Na/fluid restriction.  - Cardiology consult.         Essential hypertension  Resume home meds   Bp stable         VTE Risk Mitigation (From admission, onward)         Ordered     apixaban tablet 5 mg  2 times daily         07/31/22 0322     IP VTE HIGH RISK PATIENT  Once         07/31/22 0322     Place sequential compression device  Until discontinued         07/31/22 0322     Reason for No Pharmacological VTE Prophylaxis  Once        Question:  Reasons:  Answer:  Already adequately anticoagulated on oral Anticoagulants    07/31/22 0322                   Josefa Collins NP  Department of Hospital Medicine   [facility] - Telemetry ([facility])

## 2022-07-31 NOTE — ASSESSMENT & PLAN NOTE
Diurese with IV Lasix 40 mg Q 12 hours for now.  - Continue BB   - Strict I&Os, daily weights, Na/fluid restriction.  - Cardiology consult.

## 2022-07-31 NOTE — SUBJECTIVE & OBJECTIVE
Past Medical History:   Diagnosis Date    Arthritis     Asthma     Atrial fibrillation     Atrial fibrillation with RVR 8/7/2019    CHF (congestive heart failure)     Chronic obstructive pulmonary disease 8/5/2020    Depression     Dermatomyositis     Diabetes mellitus, type 2 Diagnosed in 2000    Elevated PSA     Follow up 7/30/2020    Hypertension     Myocardial infarction     2017    Sleep apnea        Past Surgical History:   Procedure Laterality Date    ABLATION OF ARRHYTHMOGENIC FOCUS FOR ATRIAL FIBRILLATION N/A 2/27/2020    Procedure: Ablation atrial fibrillation;  Surgeon: Gio Brown MD;  Location: [facility] EP LAB;  Service: Cardiology;  Laterality: N/A;  afib, DAMIEN (cx if SR), PVI, PITO, anes, MB, 3 Prep    CHOLECYSTECTOMY      CLOSURE OF WOUND Left 7/1/2020    Procedure: CLOSURE, WOUND;  Surgeon: Barb Veras DPM;  Location: [facility] OR;  Service: Podiatry;  Laterality: Left;    COLONOSCOPY N/A 3/4/2022    Procedure: COLONOSCOPY;  Surgeon: Yolis Freitas MD;  Location: [facility] ENDO;  Service: Endoscopy;  Laterality: N/A;    ESOPHAGOGASTRODUODENOSCOPY N/A 3/4/2022    Procedure: EGD (ESOPHAGOGASTRODUODENOSCOPY);  Surgeon: Yolis Freitas MD;  Location: [facility] ENDO;  Service: Endoscopy;  Laterality: N/A;    INJECTION OF ANESTHETIC AGENT INTO SACROILIAC JOINT Left 3/24/2021    Procedure: Left BLOCK, SACROILIAC JOINT and bilateral rhomboid TPI;  Surgeon: Dillan Tsai MD;  Location: [facility] PAIN MGT;  Service: Pain Management;  Laterality: Left;    INTRALUMINAL GASTROINTESTINAL TRACT IMAGING VIA CAPSULE N/A 3/22/2022    Procedure: IMAGING PROCEDURE, GI TRACT, INTRALUMINAL, VIA CAPSULE;  Surgeon: Justice Martinez RN;  Location: [facility] ENDO;  Service: Endoscopy;  Laterality: N/A;    REVERSE TOTAL SHOULDER ARTHROPLASTY Left 1/10/2022    Procedure: ARTHROPLASTY, SHOULDER, TOTAL, REVERSE;  Surgeon: Anthony Alvarado MD;  Location: [facility] OR;  Service: Orthopedics;  Laterality: Left;  left reverse total shoulder  arthroplasty     SELECTIVE INJECTION OF ANESTHETIC AGENT AROUND LUMBAR SPINAL NERVE ROOT BY TRANSFORAMINAL APPROACH Left 6/11/2020    Procedure: BLOCK, SPINAL NERVE ROOT, LUMBAR, SELECTIVE, TRANSFORAMINAL APPROACH Left L4-5, L5-S1 TFESI with RN IV sedation;  Surgeon: Dillan Tsai MD;  Location: [facility] PAIN MGT;  Service: Pain Management;  Laterality: Left;    TOE AMPUTATION Left 6/29/2020    Procedure: AMPUTATION, TOE;  Surgeon: Barb Veras DPM;  Location: [facility] OR;  Service: Podiatry;  Laterality: Left;  great toe       Review of patient's allergies indicates:   Allergen Reactions    Protamine Hives     Urticaria, possible upper airway swelling       No current facility-administered medications on file prior to encounter.     Current Outpatient Medications on File Prior to Encounter   Medication Sig    acetaminophen (TYLENOL) 500 MG tablet Take 2 tablets (1,000 mg total) by mouth every 8 (eight) hours as needed for Pain.    albuterol (PROVENTIL) 2.5 mg /3 mL (0.083 %) nebulizer solution Take 3 mLs (2.5 mg total) by nebulization every 4 to 6 hours as needed for Wheezing or Shortness of Breath.    allopurinoL (ZYLOPRIM) 100 MG tablet Take 1 tablet (100 mg total) by mouth once daily.    amLODIPine (NORVASC) 2.5 MG tablet Take 1 tablet (2.5 mg total) by mouth every evening.    aspirin (ECOTRIN) 81 MG EC tablet Take 1 tablet (81 mg total) by mouth once daily.    atorvastatin (LIPITOR) 40 MG tablet TAKE 1 TABLET BY MOUTH EVERY EVENING    azaTHIOprine (IMURAN) 50 mg Tab Take 1 tablet (50 mg total) by mouth once daily.    baclofen (LIORESAL) 10 MG tablet Take 1 tablet (10 mg total) by mouth once daily.    blood sugar diagnostic Strp 1 each by Misc.(Non-Drug; Combo Route) route 4 (four) times daily.    blood-glucose meter (TRUE METRIX GLUCOSE METER) kit Use as instructed to test blood glucose meter daily.    ELIQUIS 5 mg Tab Take 1 tablet (5 mg total) by mouth 2 (two) times daily.    fluticasone-salmeterol diskus inhaler  250-50 mcg Inhale 1 puff into the lungs 2 (two) times daily. Controller    furosemide (LASIX) 40 MG tablet Take 1 tablet (40 mg total) by mouth once daily.    gabapentin (NEURONTIN) 800 MG tablet Take 1 tablet (800 mg total) by mouth 3 (three) times daily.    HYDROcodone-acetaminophen (NORCO) 7.5-325 mg per tablet SMARTSI Tablet(s) By Mouth Every 12 Hours PRN    insulin aspart U-100 (NOVOLOG FLEXPEN U-100 INSULIN) 100 unit/mL (3 mL) InPn pen INJECT 10 UNITS SUBCUTANEOUSLY THREE TIMES DAILY WITH MEALS    insulin degludec (TRESIBA FLEXTOUCH U-200) 200 unit/mL (3 mL) insulin pen Inject 40 Units into the skin once daily. (Patient taking differently: Inject 38 Units into the skin once daily.)    ipratropium (ATROVENT) 0.02 % nebulizer solution INHALE THE CONTENTS OF 1 VIAL VIA NEBULIZER FOUR TIMES DAILY    isosorbide mononitrate (IMDUR) 60 MG 24 hr tablet Take 1 tablet (60 mg total) by mouth 2 (two) times a day.    JARDIANCE 25 mg tablet TAKE 1 TABLET(25 MG) BY MOUTH EVERY DAY    latanoprost 0.005 % ophthalmic solution INSTILL 1 DROP INTO BOTH EYES ONE TIME DAILY    lisinopriL 10 MG tablet Take 1 tablet (10 mg total) by mouth once daily.    metoprolol succinate (TOPROL-XL) 50 MG 24 hr tablet Take 1 tablet (50 mg total) by mouth every evening.    MICRO THIN LANCETS 33 gauge Misc     OZEMPIC 1 mg/dose (2 mg/1.5 mL) PnIj Inject 1 mg into the skin every 7 days.    pantoprazole (PROTONIX) 40 MG tablet Take 1 tablet (40 mg total) by mouth once daily.    ranolazine (RANEXA) 1,000 mg Tb12 Take 1 tablet (1,000 mg total) by mouth 2 (two) times daily.    sertraline (ZOLOFT) 100 MG tablet Take 1 tablet (100 mg total) by mouth every evening.    tacrolimus (PROTOPIC) 0.1 % ointment Apply topically 2 (two) times daily.    tamsulosin (FLOMAX) 0.4 mg Cap Take 1 capsule (0.4 mg total) by mouth once daily.    tiotropium (SPIRIVA WITH HANDIHALER) 18 mcg inhalation capsule INHALE THE CONTENTS OF 1 CAPSULE ONE TIME DAILY (CONTROLLER)     traMADoL (ULTRAM) 50 mg tablet Take 1 tablet (50 mg total) by mouth every 8 (eight) hours as needed for Pain.    traZODone (DESYREL) 100 MG tablet Take 2 tablets (200 mg total) by mouth every evening.    triamcinolone acetonide 0.1% (KENALOG) 0.1 % ointment Apply topically 2 (two) times daily. Use on legs and back    VIOS AEROSOL DELIVERY SYSTEM Shefali USE AS DIRESTED    [DISCONTINUED] diclofenac sodium (VOLTAREN) 1 % Gel APPLY 2 GRAMS TOPICALLY FOUR TIMES DAILY    [DISCONTINUED] nitroGLYCERIN (NITROSTAT) 0.4 MG SL tablet Place 1 tablet (0.4 mg total) under the tongue every 5 (five) minutes as needed for Chest pain.     Family History       Problem Relation (Age of Onset)    Cancer Maternal Grandfather    Cataracts Mother, Father, Sister    Diabetes Mother, Sister, Maternal Aunt    Glaucoma Mother, Sister, Maternal Aunt    No Known Problems Brother, Daughter, Son    Stroke Father, Sister          Tobacco Use    Smoking status: Never Smoker    Smokeless tobacco: Never Used   Substance and Sexual Activity    Alcohol use: Yes     Comment: rare, maybe holidays    Drug use: Not Currently     Types: Cocaine    Sexual activity: Not Currently     Partners: Female     Review of Systems   Constitutional:  Positive for fatigue.   HENT: Negative.     Eyes: Negative.    Respiratory:  Positive for cough and shortness of breath.    Cardiovascular:  Positive for chest pain.   Gastrointestinal: Negative.    Endocrine: Negative.    Genitourinary: Negative.    Musculoskeletal: Negative.    Skin: Negative.    Allergic/Immunologic: Negative.    Neurological: Negative.    Hematological: Negative.    Psychiatric/Behavioral: Negative.     Objective:     Vital Signs (Most Recent):  Temp: 98.3 °F (36.8 °C) (07/31/22 0320)  Pulse: 73 (07/31/22 0320)  Resp: (!) 24 (07/31/22 0320)  BP: 136/72 (07/31/22 0320)  SpO2: (!) 93 % (07/31/22 0320)   Vital Signs (24h Range):  Temp:  [98.3 °F (36.8 °C)-100 °F (37.8 °C)] 98.3 °F (36.8 °C)  Pulse:  [73-87]  73  Resp:  [16-34] 24  SpO2:  [86 %-99 %] 93 %  BP: (113-151)/(58-99) 136/72     Weight: 90 kg (198 lb 6.6 oz)  Body mass index is 33.02 kg/m².    Physical Exam  Vitals and nursing note reviewed.   Constitutional:       Appearance: Normal appearance. He is well-developed.      Interventions: Nasal cannula in place.   HENT:      Head: Normocephalic and atraumatic.   Eyes:      Pupils: Pupils are equal, round, and reactive to light.   Cardiovascular:      Rate and Rhythm: Normal rate and regular rhythm.      Pulses: Normal pulses.      Heart sounds: Normal heart sounds.   Pulmonary:      Effort: Pulmonary effort is normal. Tachypnea present. No accessory muscle usage or respiratory distress.      Breath sounds: Decreased breath sounds and wheezing present.   Abdominal:      General: Bowel sounds are normal.      Palpations: Abdomen is soft.      Tenderness: There is no abdominal tenderness.   Musculoskeletal:         General: Normal range of motion.      Cervical back: Normal range of motion and neck supple.   Skin:     General: Skin is warm and dry.      Capillary Refill: Capillary refill takes less than 2 seconds.   Neurological:      Mental Status: He is alert and oriented to person, place, and time.      Deep Tendon Reflexes: Reflexes are normal and symmetric.   Psychiatric:         Speech: Speech normal.         Behavior: Behavior normal.         Thought Content: Thought content normal.         Judgment: Judgment normal.        Significant Labs:   Results for orders placed or performed during the hospital encounter of 07/30/22   CBC auto differential   Result Value Ref Range    WBC 9.04 3.90 - 12.70 K/uL    RBC 3.72 (L) 4.60 - 6.20 M/uL    Hemoglobin 10.1 (L) 14.0 - 18.0 g/dL    Hematocrit 31.4 (L) 40.0 - 54.0 %    MCV 84 82 - 98 fL    MCH 27.2 27.0 - 31.0 pg    MCHC 32.2 32.0 - 36.0 g/dL    RDW 17.9 (H) 11.5 - 14.5 %    Platelets 159 150 - 450 K/uL    MPV 9.3 9.2 - 12.9 fL    Immature Granulocytes 0.3 0.0 - 0.5  %    Gran # (ANC) 6.6 1.8 - 7.7 K/uL    Immature Grans (Abs) 0.03 0.00 - 0.04 K/uL    Lymph # 1.6 1.0 - 4.8 K/uL    Mono # 0.8 0.3 - 1.0 K/uL    Eos # 0.1 0.0 - 0.5 K/uL    Baso # 0.01 0.00 - 0.20 K/uL    nRBC 0 0 /100 WBC    Gran % 72.9 38.0 - 73.0 %    Lymph % 17.3 (L) 18.0 - 48.0 %    Mono % 8.8 4.0 - 15.0 %    Eosinophil % 0.6 0.0 - 8.0 %    Basophil % 0.1 0.0 - 1.9 %    Differential Method Automated    Comprehensive metabolic panel   Result Value Ref Range    Sodium 142 136 - 145 mmol/L    Potassium 3.7 3.5 - 5.1 mmol/L    Chloride 107 95 - 110 mmol/L    CO2 28 23 - 29 mmol/L    Glucose 143 (H) 70 - 110 mg/dL    BUN 15 8 - 23 mg/dL    Creatinine 1.1 0.5 - 1.4 mg/dL    Calcium 8.5 (L) 8.7 - 10.5 mg/dL    Total Protein 6.4 6.0 - 8.4 g/dL    Albumin 2.6 (L) 3.5 - 5.2 g/dL    Total Bilirubin 0.5 0.1 - 1.0 mg/dL    Alkaline Phosphatase 73 55 - 135 U/L    AST 15 10 - 40 U/L    ALT 20 10 - 44 U/L    Anion Gap 7 (L) 8 - 16 mmol/L    eGFR if African American >60 >60 mL/min/1.73 m^2    eGFR if non African American >60 >60 mL/min/1.73 m^2   Urinalysis - Clean Catch   Result Value Ref Range    Specimen UA Urine, Clean Catch     Color, UA Yellow Yellow, Straw, Angela    Appearance, UA Clear Clear    pH, UA 7.0 5.0 - 8.0    Specific Gravity, UA 1.025 1.005 - 1.030    Protein, UA 2+ (A) Negative    Glucose, UA 4+ (A) Negative    Ketones, UA Negative Negative    Bilirubin (UA) Negative Negative    Occult Blood UA Negative Negative    Nitrite, UA Negative Negative    Urobilinogen, UA Negative <2.0 EU/dL    Leukocytes, UA Negative Negative   B-Type natriuretic peptide (BNP)   Result Value Ref Range     (H) 0 - 99 pg/mL   Troponin I   Result Value Ref Range    Troponin I 0.155 (H) 0.000 - 0.026 ng/mL   Urinalysis Microscopic   Result Value Ref Range    RBC, UA 0 0 - 4 /hpf    WBC, UA 0 0 - 5 /hpf    Bacteria None None-Occ /hpf    Yeast, UA None None    Hyaline Casts, UA 0 0-1/lpf /lpf    Microscopic Comment SEE COMMENT     POCT COVID-19 Rapid Screening   Result Value Ref Range    POC Rapid COVID Negative Negative     Acceptable Yes      *Note: Due to a large number of results and/or encounters for the requested time period, some results have not been displayed. A complete set of results can be found in Results Review.        Significant Imaging:   Imaging Results               X-Ray Chest AP Portable (Final result)  Result time 07/30/22 23:02:37      Final result by Gato Hebert MD (07/30/22 23:02:37)                   Impression:      Left basilar opacity concerning for atelectasis or infection.  Correlation is advised.    This report was flagged in Epic as abnormal.      Electronically signed by: Gato Hebert  Date:    07/30/2022  Time:    23:02               Narrative:    EXAMINATION:  XR CHEST AP PORTABLE    CLINICAL HISTORY:  Chest Pain;    TECHNIQUE:  Single frontal view of the chest was performed.    COMPARISON:  Multiple priors.    FINDINGS:  Left basilar opacity concerning for infection or atelectasis.  Correlation is advised.  Otherwise the lungs appear clear.    The cardiac silhouette is normal in size. The hilar and mediastinal contours are unremarkable.    Left shoulder arthroplasty.

## 2022-07-31 NOTE — ASSESSMENT & PLAN NOTE
Less likely given normal WBCs and afebrile   Procalcitonin pending   Blood and sputum cx   IV rocephin and azithromycin empirically

## 2022-07-31 NOTE — ASSESSMENT & PLAN NOTE
Currently sinus arrythmia - rate controlled  Continue home medications as appropriate  Continue eliquis  Continuous cardiac monitoring

## 2022-07-31 NOTE — ASSESSMENT & PLAN NOTE
Cardiology consult  Serial cardiac enzymes, beta blocker, ASA, ACE   Nitrates prn, Oxygen therapy to maintain sats > 92 %  Lipid panel,  2 D ECHO

## 2022-07-31 NOTE — ASSESSMENT & PLAN NOTE
Denies chest pain - EKG unrevealing  Troponin at 0.155 in ED - stable at baseline  Continue home meds as appropriate  Continuous cardiac monitoring  ECHO pending

## 2022-07-31 NOTE — ASSESSMENT & PLAN NOTE
Cont IV diuresis monitor I/O   Needs low salt compliance  CHF nursing education and f/u CHF clinic upon DC

## 2022-07-31 NOTE — ASSESSMENT & PLAN NOTE
Patient's COPD is with exacerbation noted by continued dyspnea currently.  Patient is currently off COPD Pathway. Continue scheduled inhalers Antibiotics and Supplemental oxygen and monitor respiratory status closely.     Resume home meds   Supplemental oxygen as needed

## 2022-07-31 NOTE — DISCHARGE INSTRUCTIONS
You have been started on new medication(s) from this hospitalization. Please take as directed and schedule hospital follow up appointment with your primary providers.    A nurse practitioner may be contacting you to assess your status post-hospitalization.

## 2022-07-31 NOTE — ASSESSMENT & PLAN NOTE
Denies chest pain - EKG unrevealing  Troponin at 0.155 in ED - stable at baseline  Continue home meds as appropriate  Continuous cardiac monitoring  ECHO LVEF 40-45%

## 2022-07-31 NOTE — ASSESSMENT & PLAN NOTE
Sec to demand ischemia; h/o non obs CAD on prior cath  Cont asa, statin, bb, Ranexa  Ischemic workup once diuresed, outpt if stable without CP/SOB

## 2022-07-31 NOTE — SUBJECTIVE & OBJECTIVE
Past Medical History:   Diagnosis Date    Arthritis     Asthma     Atrial fibrillation     Atrial fibrillation with RVR 8/7/2019    CHF (congestive heart failure)     Chronic obstructive pulmonary disease 8/5/2020    Depression     Dermatomyositis     Diabetes mellitus, type 2 Diagnosed in 2000    Elevated PSA     Follow up 7/30/2020    Hypertension     Myocardial infarction     2017    Sleep apnea        Past Surgical History:   Procedure Laterality Date    ABLATION OF ARRHYTHMOGENIC FOCUS FOR ATRIAL FIBRILLATION N/A 2/27/2020    Procedure: Ablation atrial fibrillation;  Surgeon: Gio Brown MD;  Location: [facility] EP LAB;  Service: Cardiology;  Laterality: N/A;  afib, DAMIEN (cx if SR), PVI, PITO, anes, MB, 3 Prep    CHOLECYSTECTOMY      CLOSURE OF WOUND Left 7/1/2020    Procedure: CLOSURE, WOUND;  Surgeon: Barb Veras DPM;  Location: [facility] OR;  Service: Podiatry;  Laterality: Left;    COLONOSCOPY N/A 3/4/2022    Procedure: COLONOSCOPY;  Surgeon: Yolis Freitas MD;  Location: [facility] ENDO;  Service: Endoscopy;  Laterality: N/A;    ESOPHAGOGASTRODUODENOSCOPY N/A 3/4/2022    Procedure: EGD (ESOPHAGOGASTRODUODENOSCOPY);  Surgeon: Yolis Freitas MD;  Location: [facility] ENDO;  Service: Endoscopy;  Laterality: N/A;    INJECTION OF ANESTHETIC AGENT INTO SACROILIAC JOINT Left 3/24/2021    Procedure: Left BLOCK, SACROILIAC JOINT and bilateral rhomboid TPI;  Surgeon: Dillan Tsai MD;  Location: [facility] PAIN MGT;  Service: Pain Management;  Laterality: Left;    INTRALUMINAL GASTROINTESTINAL TRACT IMAGING VIA CAPSULE N/A 3/22/2022    Procedure: IMAGING PROCEDURE, GI TRACT, INTRALUMINAL, VIA CAPSULE;  Surgeon: Justice Martinez RN;  Location: [facility] ENDO;  Service: Endoscopy;  Laterality: N/A;    REVERSE TOTAL SHOULDER ARTHROPLASTY Left 1/10/2022    Procedure: ARTHROPLASTY, SHOULDER, TOTAL, REVERSE;  Surgeon: Anthony Alvarado MD;  Location: [facility] OR;  Service: Orthopedics;  Laterality: Left;  left  reverse total shoulder arthroplasty     SELECTIVE INJECTION OF ANESTHETIC AGENT AROUND LUMBAR SPINAL NERVE ROOT BY TRANSFORAMINAL APPROACH Left 6/11/2020    Procedure: BLOCK, SPINAL NERVE ROOT, LUMBAR, SELECTIVE, TRANSFORAMINAL APPROACH Left L4-5, L5-S1 TFESI with RN IV sedation;  Surgeon: Dillan Tsai MD;  Location: [facility] PAIN MGT;  Service: Pain Management;  Laterality: Left;    TOE AMPUTATION Left 6/29/2020    Procedure: AMPUTATION, TOE;  Surgeon: Barb Veras DPM;  Location: [facility] OR;  Service: Podiatry;  Laterality: Left;  great toe       Review of patient's allergies indicates:   Allergen Reactions    Protamine Hives     Urticaria, possible upper airway swelling       No current facility-administered medications on file prior to encounter.     Current Outpatient Medications on File Prior to Encounter   Medication Sig    acetaminophen (TYLENOL) 500 MG tablet Take 2 tablets (1,000 mg total) by mouth every 8 (eight) hours as needed for Pain.    albuterol (PROVENTIL) 2.5 mg /3 mL (0.083 %) nebulizer solution Take 3 mLs (2.5 mg total) by nebulization every 4 to 6 hours as needed for Wheezing or Shortness of Breath.    allopurinoL (ZYLOPRIM) 100 MG tablet Take 1 tablet (100 mg total) by mouth once daily.    amLODIPine (NORVASC) 2.5 MG tablet Take 1 tablet (2.5 mg total) by mouth every evening.    aspirin (ECOTRIN) 81 MG EC tablet Take 1 tablet (81 mg total) by mouth once daily.    atorvastatin (LIPITOR) 40 MG tablet TAKE 1 TABLET BY MOUTH EVERY EVENING    azaTHIOprine (IMURAN) 50 mg Tab Take 1 tablet (50 mg total) by mouth once daily.    baclofen (LIORESAL) 10 MG tablet Take 1 tablet (10 mg total) by mouth once daily.    blood sugar diagnostic Strp 1 each by Misc.(Non-Drug; Combo Route) route 4 (four) times daily.    blood-glucose meter (TRUE METRIX GLUCOSE METER) kit Use as instructed to test blood glucose meter daily.    ELIQUIS 5 mg Tab Take 1 tablet (5 mg total) by mouth 2 (two) times daily.     fluticasone-salmeterol diskus inhaler 250-50 mcg Inhale 1 puff into the lungs 2 (two) times daily. Controller    furosemide (LASIX) 40 MG tablet Take 1 tablet (40 mg total) by mouth once daily.    gabapentin (NEURONTIN) 800 MG tablet Take 1 tablet (800 mg total) by mouth 3 (three) times daily.    HYDROcodone-acetaminophen (NORCO) 7.5-325 mg per tablet SMARTSI Tablet(s) By Mouth Every 12 Hours PRN    insulin aspart U-100 (NOVOLOG FLEXPEN U-100 INSULIN) 100 unit/mL (3 mL) InPn pen INJECT 10 UNITS SUBCUTANEOUSLY THREE TIMES DAILY WITH MEALS    insulin degludec (TRESIBA FLEXTOUCH U-200) 200 unit/mL (3 mL) insulin pen Inject 40 Units into the skin once daily. (Patient taking differently: Inject 38 Units into the skin once daily.)    ipratropium (ATROVENT) 0.02 % nebulizer solution INHALE THE CONTENTS OF 1 VIAL VIA NEBULIZER FOUR TIMES DAILY    isosorbide mononitrate (IMDUR) 60 MG 24 hr tablet Take 1 tablet (60 mg total) by mouth 2 (two) times a day.    JARDIANCE 25 mg tablet TAKE 1 TABLET(25 MG) BY MOUTH EVERY DAY    latanoprost 0.005 % ophthalmic solution INSTILL 1 DROP INTO BOTH EYES ONE TIME DAILY    lisinopriL 10 MG tablet Take 1 tablet (10 mg total) by mouth once daily.    metoprolol succinate (TOPROL-XL) 50 MG 24 hr tablet Take 1 tablet (50 mg total) by mouth every evening.    MICRO THIN LANCETS 33 gauge Misc     OZEMPIC 1 mg/dose (2 mg/1.5 mL) PnIj Inject 1 mg into the skin every 7 days.    pantoprazole (PROTONIX) 40 MG tablet Take 1 tablet (40 mg total) by mouth once daily.    ranolazine (RANEXA) 1,000 mg Tb12 Take 1 tablet (1,000 mg total) by mouth 2 (two) times daily.    sertraline (ZOLOFT) 100 MG tablet Take 1 tablet (100 mg total) by mouth every evening.    tacrolimus (PROTOPIC) 0.1 % ointment Apply topically 2 (two) times daily.    tamsulosin (FLOMAX) 0.4 mg Cap Take 1 capsule (0.4 mg total) by mouth once daily.    tiotropium (SPIRIVA WITH HANDIHALER) 18 mcg inhalation capsule INHALE  THE CONTENTS OF 1 CAPSULE ONE TIME DAILY (CONTROLLER)    traMADoL (ULTRAM) 50 mg tablet Take 1 tablet (50 mg total) by mouth every 8 (eight) hours as needed for Pain.    traZODone (DESYREL) 100 MG tablet Take 2 tablets (200 mg total) by mouth every evening.    triamcinolone acetonide 0.1% (KENALOG) 0.1 % ointment Apply topically 2 (two) times daily. Use on legs and back    VIOS AEROSOL DELIVERY SYSTEM Shefali USE AS DIRESTED    [DISCONTINUED] diclofenac sodium (VOLTAREN) 1 % Gel APPLY 2 GRAMS TOPICALLY FOUR TIMES DAILY    [DISCONTINUED] nitroGLYCERIN (NITROSTAT) 0.4 MG SL tablet Place 1 tablet (0.4 mg total) under the tongue every 5 (five) minutes as needed for Chest pain.     Family History       Problem Relation (Age of Onset)    Cancer Maternal Grandfather    Cataracts Mother, Father, Sister    Diabetes Mother, Sister, Maternal Aunt    Glaucoma Mother, Sister, Maternal Aunt    No Known Problems Brother, Daughter, Son    Stroke Father, Sister          Tobacco Use    Smoking status: Never Smoker    Smokeless tobacco: Never Used   Substance and Sexual Activity    Alcohol use: Yes     Comment: rare, maybe holidays    Drug use: Not Currently     Types: Cocaine    Sexual activity: Not Currently     Partners: Female     Review of Systems   Constitutional:  Positive for fatigue.   HENT: Negative.     Eyes: Negative.    Respiratory:  Positive for cough and shortness of breath.    Cardiovascular:  Positive for chest pain.   Gastrointestinal: Negative.    Endocrine: Negative.    Genitourinary: Negative.    Musculoskeletal: Negative.    Skin: Negative.    Allergic/Immunologic: Negative.    Neurological: Negative.    Hematological: Negative.    Psychiatric/Behavioral: Negative.     Objective:     Vital Signs (Most Recent):  Temp: 97.9 °F (36.6 °C) (07/31/22 1118)  Pulse: 73 (07/31/22 1340)  Resp: 18 (07/31/22 1340)  BP: 129/68 (07/31/22 1118)  SpO2: 96 % (07/31/22 1340)   Vital Signs (24h Range):  Temp:  [97.9 °F  (36.6 °C)-100 °F (37.8 °C)] 97.9 °F (36.6 °C)  Pulse:  [63-87] 73  Resp:  [12-34] 18  SpO2:  [86 %-99 %] 96 %  BP: (113-151)/(58-99) 129/68     Weight: 89.8 kg (198 lb)  Body mass index is 32.95 kg/m².    Physical Exam  Vitals and nursing note reviewed.   Constitutional:       Appearance: Normal appearance. He is well-developed.      Interventions: Nasal cannula in place.   HENT:      Head: Normocephalic and atraumatic.   Eyes:      Pupils: Pupils are equal, round, and reactive to light.   Cardiovascular:      Rate and Rhythm: Normal rate and regular rhythm.      Pulses: Normal pulses.      Heart sounds: Normal heart sounds.   Pulmonary:      Effort: Pulmonary effort is normal. Tachypnea present. No accessory muscle usage or respiratory distress.      Breath sounds: Decreased breath sounds and wheezing present.   Abdominal:      General: Bowel sounds are normal.      Palpations: Abdomen is soft.      Tenderness: There is no abdominal tenderness.   Musculoskeletal:         General: Normal range of motion.      Cervical back: Normal range of motion and neck supple.   Skin:     General: Skin is warm and dry.      Capillary Refill: Capillary refill takes less than 2 seconds.   Neurological:      Mental Status: He is alert and oriented to person, place, and time.      Deep Tendon Reflexes: Reflexes are normal and symmetric.   Psychiatric:         Speech: Speech normal.         Behavior: Behavior normal.         Thought Content: Thought content normal.         Judgment: Judgment normal.        Significant Labs:   Results for orders placed or performed during the hospital encounter of 07/30/22   CBC auto differential   Result Value Ref Range    WBC 9.04 3.90 - 12.70 K/uL    RBC 3.72 (L) 4.60 - 6.20 M/uL    Hemoglobin 10.1 (L) 14.0 - 18.0 g/dL    Hematocrit 31.4 (L) 40.0 - 54.0 %    MCV 84 82 - 98 fL    MCH 27.2 27.0 - 31.0 pg    MCHC 32.2 32.0 - 36.0 g/dL    RDW 17.9 (H) 11.5 - 14.5 %    Platelets 159 150 - 450 K/uL    MPV  9.3 9.2 - 12.9 fL    Immature Granulocytes 0.3 0.0 - 0.5 %    Gran # (ANC) 6.6 1.8 - 7.7 K/uL    Immature Grans (Abs) 0.03 0.00 - 0.04 K/uL    Lymph # 1.6 1.0 - 4.8 K/uL    Mono # 0.8 0.3 - 1.0 K/uL    Eos # 0.1 0.0 - 0.5 K/uL    Baso # 0.01 0.00 - 0.20 K/uL    nRBC 0 0 /100 WBC    Gran % 72.9 38.0 - 73.0 %    Lymph % 17.3 (L) 18.0 - 48.0 %    Mono % 8.8 4.0 - 15.0 %    Eosinophil % 0.6 0.0 - 8.0 %    Basophil % 0.1 0.0 - 1.9 %    Differential Method Automated    Comprehensive metabolic panel   Result Value Ref Range    Sodium 142 136 - 145 mmol/L    Potassium 3.7 3.5 - 5.1 mmol/L    Chloride 107 95 - 110 mmol/L    CO2 28 23 - 29 mmol/L    Glucose 143 (H) 70 - 110 mg/dL    BUN 15 8 - 23 mg/dL    Creatinine 1.1 0.5 - 1.4 mg/dL    Calcium 8.5 (L) 8.7 - 10.5 mg/dL    Total Protein 6.4 6.0 - 8.4 g/dL    Albumin 2.6 (L) 3.5 - 5.2 g/dL    Total Bilirubin 0.5 0.1 - 1.0 mg/dL    Alkaline Phosphatase 73 55 - 135 U/L    AST 15 10 - 40 U/L    ALT 20 10 - 44 U/L    Anion Gap 7 (L) 8 - 16 mmol/L    eGFR if African American >60 >60 mL/min/1.73 m^2    eGFR if non African American >60 >60 mL/min/1.73 m^2   Urinalysis - Clean Catch   Result Value Ref Range    Specimen UA Urine, Clean Catch     Color, UA Yellow Yellow, Straw, Angela    Appearance, UA Clear Clear    pH, UA 7.0 5.0 - 8.0    Specific Gravity, UA 1.025 1.005 - 1.030    Protein, UA 2+ (A) Negative    Glucose, UA 4+ (A) Negative    Ketones, UA Negative Negative    Bilirubin (UA) Negative Negative    Occult Blood UA Negative Negative    Nitrite, UA Negative Negative    Urobilinogen, UA Negative <2.0 EU/dL    Leukocytes, UA Negative Negative   B-Type natriuretic peptide (BNP)   Result Value Ref Range     (H) 0 - 99 pg/mL   Troponin I   Result Value Ref Range    Troponin I 0.155 (H) 0.000 - 0.026 ng/mL   Urinalysis Microscopic   Result Value Ref Range    RBC, UA 0 0 - 4 /hpf    WBC, UA 0 0 - 5 /hpf    Bacteria None None-Occ /hpf    Yeast, UA None None    Hyaline Casts,  UA 0 0-1/lpf /lpf    Microscopic Comment SEE COMMENT    Comprehensive Metabolic Panel (CMP)   Result Value Ref Range    Sodium 142 136 - 145 mmol/L    Potassium 3.8 3.5 - 5.1 mmol/L    Chloride 106 95 - 110 mmol/L    CO2 28 23 - 29 mmol/L    Glucose 152 (H) 70 - 110 mg/dL    BUN 19 8 - 23 mg/dL    Creatinine 1.1 0.5 - 1.4 mg/dL    Calcium 8.0 (L) 8.7 - 10.5 mg/dL    Total Protein 5.9 (L) 6.0 - 8.4 g/dL    Albumin 2.6 (L) 3.5 - 5.2 g/dL    Total Bilirubin 0.5 0.1 - 1.0 mg/dL    Alkaline Phosphatase 68 55 - 135 U/L    AST 13 10 - 40 U/L    ALT 18 10 - 44 U/L    Anion Gap 8 8 - 16 mmol/L    eGFR if African American >60 >60 mL/min/1.73 m^2    eGFR if non African American >60 >60 mL/min/1.73 m^2   Procalcitonin   Result Value Ref Range    Procalcitonin 0.06 <0.25 ng/mL   Troponin I   Result Value Ref Range    Troponin I 0.151 (H) 0.000 - 0.026 ng/mL   Troponin I   Result Value Ref Range    Troponin I 0.138 (H) 0.000 - 0.026 ng/mL   POCT COVID-19 Rapid Screening   Result Value Ref Range    POC Rapid COVID Negative Negative     Acceptable Yes    Echo   Result Value Ref Range    BSA 2.03 m2    TDI SEPTAL 0.04 m/s    LV LATERAL E/E' RATIO 26.00 m/s    LV SEPTAL E/E' RATIO 32.50 m/s    LA WIDTH 3.60 cm    IVC diameter 2.26 cm    Left Ventricular Outflow Tract Mean Velocity 0.00154628543701 cm/s    Left Ventricular Outflow Tract Mean Gradient 2.00 mmHg    TDI LATERAL 0.05 m/s    LVIDd 4.08 3.5 - 6.0 cm    IVS 2.06 (A) 0.6 - 1.1 cm    Posterior Wall 1.93 (A) 0.6 - 1.1 cm    Ao root annulus 2.90 cm    LVIDs 3.20 2.1 - 4.0 cm    FS 22 28 - 44 %    LA volume 80.06 cm3    STJ 2.86 cm    Ascending aorta 3.13 cm    LV mass 381.36 g    LA size 4.71 cm    Left Ventricle Relative Wall Thickness 0.95 cm    AV mean gradient 9 mmHg    AV valve area 1.66 cm2    AV Velocity Ratio 0.46     AV index (prosthetic) 0.45     MV valve area p 1/2 method 3.83 cm2    E/A ratio 2.89     Mean e' 0.05 m/s    E wave deceleration time  [id number] msec    IVRT [id number] msec    LVOT diameter 2.17 cm    LVOT area 3.7 cm2    LVOT peak mathew 0.91 m/s    LVOT peak VTI 18.60 cm    Ao peak mathew 2.00 m/s    Ao VTI 41.3 cm    RVOT peak mathew 0.52 m/s    RVOT peak VTI 9.6 cm    Mr max mathew 4.25 m/s    LVOT stroke volume 68.75 cm3    AV peak gradient 16 mmHg    PV mean gradient 0.64 mmHg    E/E' ratio 28.89 m/s    MV Peak E Mathew 1.30 m/s    TR Max Mathew 2.76 m/s    MV stenosis pressure 1/2 time [id number] ms    MV Peak A Mathew 0.45 m/s    LV Systolic Volume 40.99 mL    LV Systolic Volume Index 20.8 mL/m2    LV Diastolic Volume 73.19 mL    LV Diastolic Volume Index 37.15 mL/m2    LA Volume Index 40.6 mL/m2    LV Mass Index 194 g/m2    RA Major Axis 4.41 cm    Left Atrium Minor Axis 5.57 cm    Left Atrium Major Axis 5.54 cm    Triscuspid Valve Regurgitation Peak Gradient 30 mmHg    Right Atrial Pressure (from IVC) 15 mmHg    EF 40 %    TV rest pulmonary artery pressure 45 mmHg   POCT glucose   Result Value Ref Range    POCT Glucose 238 (H) 70 - 110 mg/dL     *Note: Due to a large number of results and/or encounters for the requested time period, some results have not been displayed. A complete set of results can be found in Results Review.        Significant Imaging:   Imaging Results               X-Ray Chest AP Portable (Final result)  Result time 07/30/22 23:02:37      Final result by Gato Hebert MD (07/30/22 23:02:37)                   Impression:      Left basilar opacity concerning for atelectasis or infection.  Correlation is advised.    This report was flagged in Epic as abnormal.      Electronically signed by: Gato Hebert  Date:    07/30/2022  Time:    23:02               Narrative:    EXAMINATION:  XR CHEST AP PORTABLE    CLINICAL HISTORY:  Chest Pain;    TECHNIQUE:  Single frontal view of the chest was performed.    COMPARISON:  Multiple priors.    FINDINGS:  Left basilar opacity concerning for infection or atelectasis.   Correlation is advised.  Otherwise the lungs appear clear.    The cardiac silhouette is normal in size. The hilar and mediastinal contours are unremarkable.    Left shoulder arthroplasty.                                     Review of Systems   Constitutional: Positive for fatigue.   HENT: Negative.     Eyes: Negative.    Cardiovascular:  Positive for chest pain.   Respiratory:  Positive for cough and shortness of breath.    Endocrine: Negative.    Hematologic/Lymphatic: Negative.    Skin: Negative.    Musculoskeletal: Negative.    Gastrointestinal: Negative.    Genitourinary: Negative.    Neurological: Negative.    Psychiatric/Behavioral: Negative.     Allergic/Immunologic: Negative.    Physical Exam  Vitals and nursing note reviewed.   Constitutional:       Appearance: Normal appearance. He is well-developed.      Interventions: Nasal cannula in place.   HENT:      Head: Normocephalic and atraumatic.   Eyes:      Pupils: Pupils are equal, round, and reactive to light.   Cardiovascular:      Rate and Rhythm: Normal rate and regular rhythm.      Pulses: Normal pulses.      Heart sounds: Normal heart sounds.   Pulmonary:      Effort: Pulmonary effort is normal. Tachypnea present. No accessory muscle usage or respiratory distress.      Breath sounds: Decreased breath sounds and wheezing present.   Abdominal:      General: Bowel sounds are normal.      Palpations: Abdomen is soft.      Tenderness: There is no abdominal tenderness.   Musculoskeletal:         General: Normal range of motion.      Cervical back: Normal range of motion and neck supple.   Skin:     General: Skin is warm and dry.      Capillary Refill: Capillary refill takes less than 2 seconds.   Neurological:      Mental Status: He is alert and oriented to person, place, and time.      Deep Tendon Reflexes: Reflexes are normal and symmetric.   Psychiatric:         Speech: Speech normal.         Behavior: Behavior normal.         Thought Content: Thought  content normal.         Judgment: Judgment normal.

## 2022-07-31 NOTE — HOSPITAL COURSE
Mr Green is a 67 year old male who presented to [facility] [place] for evaluation of chest pain. Associated symptoms include cough and shortness of breath. O 2 sats 86 % in the ED, he was placed on supplemental O 2.  , he has been started on Lasix IV. Patient was also started on IV antibiotic for concerning for athelectasis vs infection on chest X ray. As of 8/1/2022, patient continue to experience shortness of breath, cough and chest congestion. Related to Acute CHF vs COPD exacerbation. Cardiology following. As of 8/2/2022, vital signs and labs stable. Patient reports symptoms starting to improving. Cardiology following, plan Stress Test on tomorrow.     8/3  Patient underwent stress test. Denies chest pain. Qualified for home oxygen. Hyperglycemia noted as  patient received systemic steroids for dyspnea, which responded appropriately to insulin. Concerns for underlying copd. Controlled diabetes mellitus. Ambulated with physical/occupational therapy and recommended home with physical/occupational therapy.    Patient seen and evaluated by me. Patient was determined to be suitable for d/c. Patient deemed stable for discharge to home with homehealth with physical/occupational therapy and nurse practitioner to visit. Awaiting stress test and will notify patient after discharge if any changes to medication(s) necessary based on results.

## 2022-07-31 NOTE — HPI
HPI: Crow Green is a 67 y.o. male patient with a PMHx of asthma, atrial fibrillation with RVR, CHF, chronic obstructive pulmonary disease, DM, elevated PSA, HTN, MI who presents to the Emergency Department for evaluation of mid-sternal chest pain which onset gradually yesterday. Pt also c/o a cough and feeling weak. Symptoms are constant and moderate in severity. No mitigating or exacerbating factors reported. Associated sxs include weakness, cough. Patient denies any n/v/d, fever, chills, and all other sxs at this time.   In the ED O2 sats 86%, RR 30, CBC unremarkable, , Troponin 0.155, CXR showed Left basilar opacity concerning for atelectasis or infection.       Cardiology consulted for CHF exac with elevated trop. Trop trending down. ECHO today with stable LV function 40-45 with mild dec RV function, inc CVP, PASP 45 mmHg. Diuresing well now with IV lasix; pt admits to dietary non compliance with adding salt. Has cough now with SOB/mild CP.       Results for orders placed during the hospital encounter of 07/30/22    Echo    Interpretation Summary  · The left ventricle is normal in size with severe concentric hypertrophy and mildly decreased systolic function.  · Mild left atrial enlargement.  · The estimated ejection fraction is 40 - 45%.  · Grade III left ventricular diastolic dysfunction.  · Normal right ventricular size with mildly reduced right ventricular systolic function.  · Mild tricuspid regurgitation.  · Mild pulmonic regurgitation.  · Elevated central venous pressure (15 mmHg).  · The estimated PA systolic pressure is 45 mmHg.  · There is pulmonary hypertension.

## 2022-08-01 ENCOUNTER — DOCUMENT SCAN (OUTPATIENT)
Dept: HOME HEALTH SERVICES | Facility: HOSPITAL | Age: 67
End: 2022-08-01
Payer: MEDICARE

## 2022-08-01 ENCOUNTER — DOCUMENTATION ONLY (OUTPATIENT)
Dept: TRANSPLANT | Facility: CLINIC | Age: 67
End: 2022-08-01
Payer: MEDICARE

## 2022-08-01 DIAGNOSIS — I50.43 ACUTE ON CHRONIC COMBINED SYSTOLIC AND DIASTOLIC HEART FAILURE: Primary | ICD-10-CM

## 2022-08-01 DIAGNOSIS — I50.1 CARDIAC ASTHMA: Primary | ICD-10-CM

## 2022-08-01 LAB
ALBUMIN SERPL BCP-MCNC: 2.5 G/DL (ref 3.5–5.2)
ALP SERPL-CCNC: 67 U/L (ref 55–135)
ALT SERPL W/O P-5'-P-CCNC: 15 U/L (ref 10–44)
ANION GAP SERPL CALC-SCNC: 9 MMOL/L (ref 8–16)
ANION GAP SERPL CALC-SCNC: 9 MMOL/L (ref 8–16)
AST SERPL-CCNC: 12 U/L (ref 10–40)
BASOPHILS # BLD AUTO: 0.01 K/UL (ref 0–0.2)
BASOPHILS NFR BLD: 0.1 % (ref 0–1.9)
BILIRUB SERPL-MCNC: 0.4 MG/DL (ref 0.1–1)
BUN SERPL-MCNC: 19 MG/DL (ref 8–23)
BUN SERPL-MCNC: 19 MG/DL (ref 8–23)
CALCIUM SERPL-MCNC: 8.4 MG/DL (ref 8.7–10.5)
CALCIUM SERPL-MCNC: 8.4 MG/DL (ref 8.7–10.5)
CHLORIDE SERPL-SCNC: 102 MMOL/L (ref 95–110)
CHLORIDE SERPL-SCNC: 102 MMOL/L (ref 95–110)
CO2 SERPL-SCNC: 30 MMOL/L (ref 23–29)
CO2 SERPL-SCNC: 30 MMOL/L (ref 23–29)
CREAT SERPL-MCNC: 1.4 MG/DL (ref 0.5–1.4)
CREAT SERPL-MCNC: 1.4 MG/DL (ref 0.5–1.4)
DIFFERENTIAL METHOD: ABNORMAL
EOSINOPHIL # BLD AUTO: 0.2 K/UL (ref 0–0.5)
EOSINOPHIL NFR BLD: 3.4 % (ref 0–8)
ERYTHROCYTE [DISTWIDTH] IN BLOOD BY AUTOMATED COUNT: 17.4 % (ref 11.5–14.5)
EST. GFR  (NO RACE VARIABLE): 56 ML/MIN/1.73 M^2
EST. GFR  (NO RACE VARIABLE): 56 ML/MIN/1.73 M^2
ESTIMATED AVG GLUCOSE: 126 MG/DL (ref 68–131)
GLUCOSE SERPL-MCNC: 154 MG/DL (ref 70–110)
GLUCOSE SERPL-MCNC: 154 MG/DL (ref 70–110)
HBA1C MFR BLD: 6 % (ref 4–5.6)
HCT VFR BLD AUTO: 31.4 % (ref 40–54)
HGB BLD-MCNC: 9.7 G/DL (ref 14–18)
IMM GRANULOCYTES # BLD AUTO: 0.02 K/UL (ref 0–0.04)
IMM GRANULOCYTES NFR BLD AUTO: 0.3 % (ref 0–0.5)
LYMPHOCYTES # BLD AUTO: 1.4 K/UL (ref 1–4.8)
LYMPHOCYTES NFR BLD: 20.5 % (ref 18–48)
MCH RBC QN AUTO: 27.2 PG (ref 27–31)
MCHC RBC AUTO-ENTMCNC: 30.9 G/DL (ref 32–36)
MCV RBC AUTO: 88 FL (ref 82–98)
MONOCYTES # BLD AUTO: 0.6 K/UL (ref 0.3–1)
MONOCYTES NFR BLD: 8.8 % (ref 4–15)
NEUTROPHILS # BLD AUTO: 4.7 K/UL (ref 1.8–7.7)
NEUTROPHILS NFR BLD: 66.9 % (ref 38–73)
NRBC BLD-RTO: 0 /100 WBC
PLATELET # BLD AUTO: 151 K/UL (ref 150–450)
PMV BLD AUTO: 11.2 FL (ref 9.2–12.9)
POCT GLUCOSE: 149 MG/DL (ref 70–110)
POCT GLUCOSE: 160 MG/DL (ref 70–110)
POCT GLUCOSE: 228 MG/DL (ref 70–110)
POCT GLUCOSE: 243 MG/DL (ref 70–110)
POTASSIUM SERPL-SCNC: 4.1 MMOL/L (ref 3.5–5.1)
POTASSIUM SERPL-SCNC: 4.1 MMOL/L (ref 3.5–5.1)
PROT SERPL-MCNC: 6 G/DL (ref 6–8.4)
RBC # BLD AUTO: 3.57 M/UL (ref 4.6–6.2)
SODIUM SERPL-SCNC: 141 MMOL/L (ref 136–145)
SODIUM SERPL-SCNC: 141 MMOL/L (ref 136–145)
WBC # BLD AUTO: 7.02 K/UL (ref 3.9–12.7)

## 2022-08-01 PROCEDURE — 99225 PR SUBSEQUENT OBSERVATION CARE,LEVEL II: CPT | Mod: ,,, | Performed by: PHYSICIAN ASSISTANT

## 2022-08-01 PROCEDURE — 25000003 PHARM REV CODE 250: Performed by: STUDENT IN AN ORGANIZED HEALTH CARE EDUCATION/TRAINING PROGRAM

## 2022-08-01 PROCEDURE — 94761 N-INVAS EAR/PLS OXIMETRY MLT: CPT

## 2022-08-01 PROCEDURE — 94640 AIRWAY INHALATION TREATMENT: CPT

## 2022-08-01 PROCEDURE — 94640 AIRWAY INHALATION TREATMENT: CPT | Mod: XB

## 2022-08-01 PROCEDURE — 27000221 HC OXYGEN, UP TO 24 HOURS

## 2022-08-01 PROCEDURE — 97161 PT EVAL LOW COMPLEX 20 MIN: CPT

## 2022-08-01 PROCEDURE — 36415 COLL VENOUS BLD VENIPUNCTURE: CPT | Performed by: NURSE PRACTITIONER

## 2022-08-01 PROCEDURE — 94660 CPAP INITIATION&MGMT: CPT

## 2022-08-01 PROCEDURE — 97166 OT EVAL MOD COMPLEX 45 MIN: CPT

## 2022-08-01 PROCEDURE — 96366 THER/PROPH/DIAG IV INF ADDON: CPT

## 2022-08-01 PROCEDURE — 99900035 HC TECH TIME PER 15 MIN (STAT)

## 2022-08-01 PROCEDURE — 63600175 PHARM REV CODE 636 W HCPCS: Performed by: NURSE PRACTITIONER

## 2022-08-01 PROCEDURE — 25000242 PHARM REV CODE 250 ALT 637 W/ HCPCS: Performed by: INTERNAL MEDICINE

## 2022-08-01 PROCEDURE — 80053 COMPREHEN METABOLIC PANEL: CPT | Performed by: NURSE PRACTITIONER

## 2022-08-01 PROCEDURE — 99225 PR SUBSEQUENT OBSERVATION CARE,LEVEL II: ICD-10-PCS | Mod: ,,, | Performed by: PHYSICIAN ASSISTANT

## 2022-08-01 PROCEDURE — 96376 TX/PRO/DX INJ SAME DRUG ADON: CPT

## 2022-08-01 PROCEDURE — 25000003 PHARM REV CODE 250: Performed by: NURSE PRACTITIONER

## 2022-08-01 PROCEDURE — 97530 THERAPEUTIC ACTIVITIES: CPT

## 2022-08-01 PROCEDURE — 96361 HYDRATE IV INFUSION ADD-ON: CPT

## 2022-08-01 PROCEDURE — 85025 COMPLETE CBC W/AUTO DIFF WBC: CPT | Performed by: NURSE PRACTITIONER

## 2022-08-01 PROCEDURE — 96372 THER/PROPH/DIAG INJ SC/IM: CPT | Performed by: NURSE PRACTITIONER

## 2022-08-01 PROCEDURE — G0378 HOSPITAL OBSERVATION PER HR: HCPCS

## 2022-08-01 RX ORDER — SODIUM CHLORIDE 9 MG/ML
INJECTION, SOLUTION INTRAVENOUS
Status: DISCONTINUED | OUTPATIENT
Start: 2022-08-01 | End: 2022-08-03 | Stop reason: HOSPADM

## 2022-08-01 RX ADMIN — IPRATROPIUM BROMIDE AND ALBUTEROL SULFATE 3 ML: 2.5; .5 SOLUTION RESPIRATORY (INHALATION) at 12:08

## 2022-08-01 RX ADMIN — APIXABAN 5 MG: 2.5 TABLET, FILM COATED ORAL at 08:08

## 2022-08-01 RX ADMIN — BUDESONIDE 0.5 MG: 0.5 INHALANT ORAL at 07:08

## 2022-08-01 RX ADMIN — GABAPENTIN 800 MG: 400 CAPSULE ORAL at 02:08

## 2022-08-01 RX ADMIN — SODIUM CHLORIDE: 0.9 INJECTION, SOLUTION INTRAVENOUS at 12:08

## 2022-08-01 RX ADMIN — CEFTRIAXONE 1 G: 1 INJECTION, SOLUTION INTRAVENOUS at 12:08

## 2022-08-01 RX ADMIN — PANTOPRAZOLE SODIUM 40 MG: 40 TABLET, DELAYED RELEASE ORAL at 09:08

## 2022-08-01 RX ADMIN — INSULIN ASPART 2 UNITS: 100 INJECTION, SOLUTION INTRAVENOUS; SUBCUTANEOUS at 05:08

## 2022-08-01 RX ADMIN — HYDROCODONE BITARTRATE AND ACETAMINOPHEN 1 TABLET: 10; 325 TABLET ORAL at 09:08

## 2022-08-01 RX ADMIN — ARFORMOTEROL TARTRATE 15 MCG: 15 SOLUTION RESPIRATORY (INHALATION) at 07:08

## 2022-08-01 RX ADMIN — APIXABAN 5 MG: 2.5 TABLET, FILM COATED ORAL at 09:08

## 2022-08-01 RX ADMIN — TAMSULOSIN HYDROCHLORIDE 0.4 MG: 0.4 CAPSULE ORAL at 09:08

## 2022-08-01 RX ADMIN — TRAZODONE HYDROCHLORIDE 200 MG: 100 TABLET ORAL at 08:08

## 2022-08-01 RX ADMIN — GUAIFENESIN AND DEXTROMETHORPHAN 5 ML: 100; 10 SYRUP ORAL at 09:08

## 2022-08-01 RX ADMIN — IPRATROPIUM BROMIDE AND ALBUTEROL SULFATE 3 ML: 2.5; .5 SOLUTION RESPIRATORY (INHALATION) at 07:08

## 2022-08-01 RX ADMIN — IPRATROPIUM BROMIDE AND ALBUTEROL SULFATE 3 ML: 2.5; .5 SOLUTION RESPIRATORY (INHALATION) at 01:08

## 2022-08-01 RX ADMIN — SODIUM CHLORIDE: 0.9 INJECTION, SOLUTION INTRAVENOUS at 04:08

## 2022-08-01 RX ADMIN — FUROSEMIDE 40 MG: 10 INJECTION, SOLUTION INTRAMUSCULAR; INTRAVENOUS at 09:08

## 2022-08-01 RX ADMIN — SERTRALINE HYDROCHLORIDE 100 MG: 50 TABLET ORAL at 08:08

## 2022-08-01 RX ADMIN — INSULIN ASPART 2 UNITS: 100 INJECTION, SOLUTION INTRAVENOUS; SUBCUTANEOUS at 04:08

## 2022-08-01 RX ADMIN — RANOLAZINE 1000 MG: 500 TABLET, EXTENDED RELEASE ORAL at 09:08

## 2022-08-01 RX ADMIN — FUROSEMIDE 40 MG: 10 INJECTION, SOLUTION INTRAMUSCULAR; INTRAVENOUS at 11:08

## 2022-08-01 RX ADMIN — RANOLAZINE 1000 MG: 500 TABLET, EXTENDED RELEASE ORAL at 08:08

## 2022-08-01 RX ADMIN — ISOSORBIDE MONONITRATE 60 MG: 60 TABLET, EXTENDED RELEASE ORAL at 08:08

## 2022-08-01 RX ADMIN — ATORVASTATIN CALCIUM 40 MG: 40 TABLET, FILM COATED ORAL at 08:08

## 2022-08-01 RX ADMIN — GABAPENTIN 800 MG: 400 CAPSULE ORAL at 08:08

## 2022-08-01 RX ADMIN — METOPROLOL SUCCINATE 50 MG: 50 TABLET, EXTENDED RELEASE ORAL at 08:08

## 2022-08-01 RX ADMIN — ASPIRIN 81 MG: 81 TABLET, COATED ORAL at 09:08

## 2022-08-01 RX ADMIN — ACETAMINOPHEN 1000 MG: 500 TABLET ORAL at 09:08

## 2022-08-01 RX ADMIN — AZITHROMYCIN MONOHYDRATE 500 MG: 500 INJECTION, POWDER, LYOPHILIZED, FOR SOLUTION INTRAVENOUS at 04:08

## 2022-08-01 RX ADMIN — ISOSORBIDE MONONITRATE 60 MG: 60 TABLET, EXTENDED RELEASE ORAL at 09:08

## 2022-08-01 RX ADMIN — GABAPENTIN 800 MG: 400 CAPSULE ORAL at 09:08

## 2022-08-01 NOTE — ASSESSMENT & PLAN NOTE
Cont IV diuresis monitor I/O   Needs low salt compliance  CHF nursing education and f/u CHF clinic upon DC    8/1/22  -Continue IV diuresis for now  -Continue OMT-Toprol XL, ACEi  -Echo showed EF of 40-45%, DD  -Will consider MPI Stress test once respiratory status improved

## 2022-08-01 NOTE — PLAN OF CARE
P.T. EVAL COMPLETE.  PT CURRENTLY REQUIRES SBA FOR BED MOBILITY AND TF'S, CG/SBA FOR GAIT WITH RW.  P.T. RECOMMENDS HHPT AT D/C

## 2022-08-01 NOTE — CONSULTS
Food & Nutrition  Education    Diet Education: Low Sodium and Fluid Restriction   Time Spent: 15 minutes   Learners: Patient       Nutrition Education provided with handouts:   Low Sodium and Fluid Restriction (NCM)    Comments:  Education was provided on a low sodium and fluid. Patient voiced understanding. We discussed no salt added seasoning, high sodium foods, and foods that are considered fluids. Will continue to monitor.     Please Re-consult as needed    Thanks!  Brisa Roldan RD,LDN

## 2022-08-01 NOTE — PLAN OF CARE
OT renita completed. Sup>sit mod I, sit>stand SBA, functional mobility x80ft x2 trials with RW and SBA, step>pivot to bedside chair with RW and SBA. Recommending HHOT and RW and d/c.

## 2022-08-01 NOTE — ASSESSMENT & PLAN NOTE
Sec to demand ischemia; h/o non obs CAD on prior cath  Cont asa, statin, bb, Ranexa  Ischemic workup once diuresed, outpt if stable without CP/SOB    8/1/22  -Continue ASA, statin, BB, Ranexa  -Ischemic evaluation once respiratory status improved

## 2022-08-01 NOTE — PT/OT/SLP EVAL
Physical Therapy Evaluation    Patient Name:  Crow Green   MRN:  [id number]    Recommendations:     Discharge Recommendations:  home health PT   Discharge Equipment Recommendations: none   Barriers to discharge: None    Assessment:     Crow Green is a 67 y.o. male admitted with a medical diagnosis of Acute on chronic respiratory failure with hypoxia.  He presents with the following impairments/functional limitations:  weakness, impaired endurance, impaired functional mobility, impaired balance, decreased safety awareness, pain, decreased lower extremity function, decreased coordination.    Rehab Prognosis: Good; patient would benefit from acute skilled PT services to address these deficits and reach maximum level of function.    Recent Surgery: * No surgery found *     Plan:     During this hospitalization, patient to be seen 3 x/week to address the identified rehab impairments via gait training, therapeutic activities, therapeutic exercises and progress toward the following goals:    · Plan of Care Expires:  08/15/22    Subjective     Chief Complaint:   Patient/Family Comments/goals:   Pain/Comfort:  · Pain Rating 1: 8/10  · Location 1: back  · Pain Addressed 1: Reposition, Distraction  · Pain Rating 2: 7/10  · Location - Side 2: Left  · Location 2: shoulder  · Pain Addressed 2: Reposition, Distraction    Patients cultural, spiritual, [employer] conflicts given the current situation:      Living Environment:  PT LIVES WITH 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  Equipment used at home: wheelchair, shower chair, rollator.  DME owned (not currently used): none.  Upon discharge, patient will have assistance from ?.    Objective:     Communicated  "with NURSE MARKUS prior to session.  Patient found supine with telemetry, oxygen, peripheral IV, PureWick  upon PT entry to room.    General Precautions: Standard, fall   Orthopedic Precautions:N/A   Braces: N/A  Respiratory Status: Nasal cannula, flow 2 L/min    Exams:  · Cognitive Exam:  Patient is oriented to Person, Place, Time and Situation  · Postural Exam:  Patient presented with the following abnormalities:    · -       Rounded shoulders  · Sensation:    · -       Impaired  B HANDS, B FEET  · RLE ROM: WFL  · RLE Strength: WFL  · LLE ROM: WFL  · LLE Strength: WFL    Functional Mobility:  · Bed Mobility:     · Rolling Left:  supervision  · Scooting: supervision  · Supine to Sit: supervision  · Transfers:     · Sit to Stand:  supervision with no AD  · Bed to Chair: supervision with  rolling walker  using  Step Transfer  · Gait: PT AMB 70' X 2 TRIALS WITH RW AND CG/SBA, NO LOB OR SOB WITH O2 IN TOW, PT C/O DIZZINESS THAT DID RESOLVE OVER TIME  · Balance: FAIR+    Therapeutic Activities and Exercises:   PT EDUCATED IN ROLE OF P.T. AND POC IN ACUTE CARE HOSPITAL SETTING, EDUCATED IN RW USE AND SAFETY DURING TF'S AND GAIT, ENCOURAGED TO INCREASE TIME OOB IN CHAIR TO TOLERANCE, EDUCATED IN AND ENCOURAGED TO PERFORM BLE THEREX WHILE SEATED OR SUPINE THROUGHOUT THE DAY TO TOLERANCE: HIP FLEX/EXT, QUAD SET, LAQ, HEEL SLIDES, AP'S.  PT AGREEABLE TO REQUESTS  PT EDUCATED ON RISK FOR FALLS DUE TO GENERALIZED WEAKNESS, EDUCATED ON "CALL DON'T FALL", ENCOURAGED TO CALL FOR ASSISTANCE WITH ALL NEEDS SUCH AS BED<>CHAIR TRANSFERS OR TRIPS TO BATHROOM, PT AGREEABLE TO SAFETY PRECAUTIONS    AM-PAC 6 CLICK MOBILITY  Total Score:20     Patient left up in chair with all lines intact, call button in reach, chair alarm on and NURSE notified.    GOALS:   Multidisciplinary Problems     Physical Therapy Goals        Problem: Physical Therapy    Goal Priority Disciplines Outcome Goal Variances Interventions   Physical Therapy Goal     PT, " PT/OT      Description: LTG'S TO BE MET IN 14 DAYS (8-15-22)  PT WILL REQUIRE FELIX FOR BED MOBILITY  PT WILL REQUIRE FELIX FOR BED<>CHAIR TF'S  PT WILL  FEET WITH RW AND FELIX                     History:     Past Medical History:   Diagnosis Date    Arthritis     Asthma     Atrial fibrillation     Atrial fibrillation with RVR 8/7/2019    CHF (congestive heart failure)     Chronic obstructive pulmonary disease 8/5/2020    Depression     Dermatomyositis     Diabetes mellitus, type 2 Diagnosed in 2000    Elevated PSA     Follow up 7/30/2020    Hypertension     Myocardial infarction     2017    Sleep apnea        Past Surgical History:   Procedure Laterality Date    ABLATION OF ARRHYTHMOGENIC FOCUS FOR ATRIAL FIBRILLATION N/A 2/27/2020    Procedure: Ablation atrial fibrillation;  Surgeon: Gio Brown MD;  Location: [facility] EP LAB;  Service: Cardiology;  Laterality: N/A;  afib, DAMIEN (cx if SR), PVI, PITO, anes, MB, 3 Prep    CHOLECYSTECTOMY      CLOSURE OF WOUND Left 7/1/2020    Procedure: CLOSURE, WOUND;  Surgeon: Barb Veras DPM;  Location: [facility] OR;  Service: Podiatry;  Laterality: Left;    COLONOSCOPY N/A 3/4/2022    Procedure: COLONOSCOPY;  Surgeon: Yolis Freitas MD;  Location: [facility];  Service: Endoscopy;  Laterality: N/A;    ESOPHAGOGASTRODUODENOSCOPY N/A 3/4/2022    Procedure: EGD (ESOPHAGOGASTRODUODENOSCOPY);  Surgeon: Yolis Freitas MD;  Location: [facility] ENDO;  Service: Endoscopy;  Laterality: N/A;    INJECTION OF ANESTHETIC AGENT INTO SACROILIAC JOINT Left 3/24/2021    Procedure: Left BLOCK, SACROILIAC JOINT and bilateral rhomboid TPI;  Surgeon: Dillan Tsai MD;  Location: [facility] PAIN MGT;  Service: Pain Management;  Laterality: Left;    INTRALUMINAL GASTROINTESTINAL TRACT IMAGING VIA CAPSULE N/A 3/22/2022    Procedure: IMAGING PROCEDURE, GI TRACT, INTRALUMINAL, VIA CAPSULE;  Surgeon: Justice Martinez RN;  Location: [facility] ENDO;  Service: Endoscopy;  Laterality: N/A;     REVERSE TOTAL SHOULDER ARTHROPLASTY Left 1/10/2022    Procedure: ARTHROPLASTY, SHOULDER, TOTAL, REVERSE;  Surgeon: Anthony Alvarado MD;  Location: [facility] OR;  Service: Orthopedics;  Laterality: Left;  left reverse total shoulder arthroplasty     SELECTIVE INJECTION OF ANESTHETIC AGENT AROUND LUMBAR SPINAL NERVE ROOT BY TRANSFORAMINAL APPROACH Left 6/11/2020    Procedure: BLOCK, SPINAL NERVE ROOT, LUMBAR, SELECTIVE, TRANSFORAMINAL APPROACH Left L4-5, L5-S1 TFESI with RN IV sedation;  Surgeon: Dillan Tsai MD;  Location: [facility] PAIN T;  Service: Pain Management;  Laterality: Left;    TOE AMPUTATION Left 6/29/2020    Procedure: AMPUTATION, TOE;  Surgeon: Barb Veras DPM;  Location: [facility] OR;  Service: Podiatry;  Laterality: Left;  great toe       Time Tracking:     PT Received On: 08/01/22  PT Start Time: 0850     PT Stop Time: 0913  PT Total Time (min): 23 min     Billable Minutes: Evaluation 15 and Therapeutic Activity 8    08/01/2022

## 2022-08-01 NOTE — HOSPITAL COURSE
8/1/22-Patient seen and examined today, sitting up in bedside chair. Feels poorly. Still complains of SOB and non-productive cough. No chest pain but does endorse rib soreness from coughing spells. Diuresed well overnight. Labs reviewed. Creatinine 1.4. Discussed ischemic evaluation with MPI stress test once respiratory status improved.     8/2/22-Patient seen and examined today, lying in bed. Feels ok. Still having coughing spells. SOB slowly improving. BNP trending down, creatinine stable. MPI stress test tmw.     8/3/22-Patient seen and examined today. SOB improved. Creatinine stable, K 5.2. MPI stress test today.

## 2022-08-01 NOTE — SUBJECTIVE & OBJECTIVE
Review of Systems   Constitutional: Positive for malaise/fatigue.   HENT: Negative.     Eyes: Negative.    Cardiovascular:  Positive for dyspnea on exertion.   Respiratory:  Positive for shortness of breath.    Endocrine: Negative.    Hematologic/Lymphatic: Negative.    Skin: Negative.    Musculoskeletal:         Rib pain     Gastrointestinal: Negative.    Genitourinary: Negative.    Neurological: Negative.    Psychiatric/Behavioral: Negative.     Allergic/Immunologic: Negative.    Objective:     Vital Signs (Most Recent):  Temp: 97.8 °F (36.6 °C) (08/01/22 0721)  Pulse: 61 (08/01/22 0721)  Resp: 20 (08/01/22 0721)  BP: (!) 98/54 (08/01/22 0721)  SpO2: 97 % (08/01/22 0721)   Vital Signs (24h Range):  Temp:  [97.4 °F (36.3 °C)-98 °F (36.7 °C)] 97.8 °F (36.6 °C)  Pulse:  [61-82] 61  Resp:  [12-25] 20  SpO2:  [92 %-97 %] 97 %  BP: ()/(54-68) 98/54     Weight: 81.8 kg (180 lb 5.4 oz)  Body mass index is 30.01 kg/m².     SpO2: 97 %  O2 Device (Oxygen Therapy): nasal cannula      Intake/Output Summary (Last 24 hours) at 8/1/2022 1110  Last data filed at 8/1/2022 0707  Gross per 24 hour   Intake 619.1 ml   Output 1700 ml   Net -1080.9 ml       Lines/Drains/Airways       Peripheral Intravenous Line  Duration                  Peripheral IV - Single Lumen 20 G Left Forearm -- days                    Physical Exam  Vitals and nursing note reviewed.   Constitutional:       General: He is not in acute distress.     Appearance: Normal appearance. He is well-developed. He is not diaphoretic.   HENT:      Head: Normocephalic and atraumatic.   Eyes:      General:         Right eye: No discharge.         Left eye: No discharge.      Pupils: Pupils are equal, round, and reactive to light.   Neck:      Thyroid: No thyromegaly.      Vascular: No JVD.      Trachea: No tracheal deviation.   Cardiovascular:      Rate and Rhythm: Normal rate and regular rhythm.      Chest Wall: PMI is not displaced.      Pulses: Intact distal  pulses.      Heart sounds: Normal heart sounds, S1 normal and S2 normal. No murmur heard.  Pulmonary:      Effort: Pulmonary effort is normal. No respiratory distress.      Breath sounds: No wheezing.      Comments: Diminished BS    Occasional faint wheeze  Abdominal:      General: There is no distension.      Palpations: Abdomen is soft.      Tenderness: There is no rebound.   Musculoskeletal:      Cervical back: Neck supple.      Right lower leg: No edema.      Left lower leg: No edema.   Skin:     General: Skin is warm and dry.      Findings: No erythema.   Neurological:      General: No focal deficit present.      Mental Status: He is alert and oriented to person, place, and time.   Psychiatric:         Mood and Affect: Mood normal.         Behavior: Behavior normal.         Thought Content: Thought content normal.       Significant Labs: CMP   Recent Labs   Lab 07/30/22 2232 07/31/22  0634 08/01/22  0316    142 141  141   K 3.7 3.8 4.1  4.1    106 102  102   CO2 28 28 30*  30*   * 152* 154*  154*   BUN 15 19 19  19   CREATININE 1.1 1.1 1.4  1.4   CALCIUM 8.5* 8.0* 8.4*  8.4*   PROT 6.4 5.9* 6.0   ALBUMIN 2.6* 2.6* 2.5*   BILITOT 0.5 0.5 0.4   ALKPHOS 73 68 67   AST 15 13 12   ALT 20 18 15   ANIONGAP 7* 8 9  9   ESTGFRAFRICA >60 >60  --    EGFRNONAA >60 >60  --    , CBC   Recent Labs   Lab 07/30/22 2232   WBC 9.04   HGB 10.1*   HCT 31.4*      , Troponin   Recent Labs   Lab 07/30/22 2232 07/31/22  0634 07/31/22  0925   TROPONINI 0.155* 0.151* 0.138*   , and All pertinent lab results from the last 24 hours have been reviewed.    Significant Imaging: Echocardiogram: Transthoracic echo (TTE) complete (Cupid Only):   Results for orders placed or performed during the hospital encounter of 07/30/22   Echo   Result Value Ref Range    BSA 2.03 m2    TDI SEPTAL 0.04 m/s    LV LATERAL E/E' RATIO 26.00 m/s    LV SEPTAL E/E' RATIO 32.50 m/s    LA WIDTH 3.60 cm    IVC diameter 2.26 cm     Left Ventricular Outflow Tract Mean Velocity 0.44893526773148 cm/s    Left Ventricular Outflow Tract Mean Gradient 2.00 mmHg    TDI LATERAL 0.05 m/s    LVIDd 4.08 3.5 - 6.0 cm    IVS 2.06 (A) 0.6 - 1.1 cm    Posterior Wall 1.93 (A) 0.6 - 1.1 cm    Ao root annulus 2.90 cm    LVIDs 3.20 2.1 - 4.0 cm    FS 22 28 - 44 %    LA volume 80.06 cm3    STJ 2.86 cm    Ascending aorta 3.13 cm    LV mass 381.36 g    LA size 4.71 cm    Left Ventricle Relative Wall Thickness 0.95 cm    AV mean gradient 9 mmHg    AV valve area 1.66 cm2    AV Velocity Ratio 0.46     AV index (prosthetic) 0.45     MV valve area p 1/2 method 3.83 cm2    E/A ratio 2.89     Mean e' 0.05 m/s    E wave deceleration time 198.444881036029017 msec    IVRT 59.975394808885924 msec    LVOT diameter 2.17 cm    LVOT area 3.7 cm2    LVOT peak mathew 0.91 m/s    LVOT peak VTI 18.60 cm    Ao peak mathew 2.00 m/s    Ao VTI 41.3 cm    RVOT peak mathew 0.52 m/s    RVOT peak VTI 9.6 cm    Mr max mathew 4.25 m/s    LVOT stroke volume 68.75 cm3    AV peak gradient 16 mmHg    PV mean gradient 0.64 mmHg    E/E' ratio 28.89 m/s    MV Peak E Mathew 1.30 m/s    TR Max Mathew 2.76 m/s    MV stenosis pressure 1/2 time 57.637573032919182 ms    MV Peak A Mathew 0.45 m/s    LV Systolic Volume 40.99 mL    LV Systolic Volume Index 20.8 mL/m2    LV Diastolic Volume 73.19 mL    LV Diastolic Volume Index 37.15 mL/m2    LA Volume Index 40.6 mL/m2    LV Mass Index 194 g/m2    RA Major Axis 4.41 cm    Left Atrium Minor Axis 5.57 cm    Left Atrium Major Axis 5.54 cm    Triscuspid Valve Regurgitation Peak Gradient 30 mmHg    Right Atrial Pressure (from IVC) 15 mmHg    EF 40 %    TV rest pulmonary artery pressure 45 mmHg    Narrative    · The left ventricle is normal in size with severe concentric hypertrophy   and mildly decreased systolic function.  · Mild left atrial enlargement.  · The estimated ejection fraction is 40 - 45%.  · Grade III left ventricular diastolic dysfunction.  · Normal right ventricular size  with mildly reduced right ventricular   systolic function.  · Mild tricuspid regurgitation.  · Mild pulmonic regurgitation.  · Elevated central venous pressure (15 mmHg).  · The estimated PA systolic pressure is 45 mmHg.  · There is pulmonary hypertension.      , EKG: Reviewed, and X-Ray: CXR: X-Ray Chest 1 View (CXR): No results found for this visit on 07/30/22. and X-Ray Chest PA and Lateral (CXR): No results found for this visit on 07/30/22.

## 2022-08-01 NOTE — ASSESSMENT & PLAN NOTE
Treat underlying cause PNA vs Volume overload vs COPD    Supplemental oxygen      8/1/2022  WBC and procalcitonin normal   Probable related to Acute CHF and COPD   Will deescalated antibiotics

## 2022-08-01 NOTE — ASSESSMENT & PLAN NOTE
Denies chest pain - EKG unrevealing  Troponin at 0.155 in ED - stable at baseline  Continue home meds as appropriate  Continuous cardiac monitoring  ECHO LVEF 40-45%  Cardiology following

## 2022-08-01 NOTE — PROGRESS NOTES
[facility] Telemetry ([facility])  [facility] Medicine  Progress Note    Patient Name: Crow Green  MRN: [id number]  Patient Class: OP- Observation   Admission Date: 7/30/2022  Length of Stay: 0 days  Attending Physician: Justin Alba MD  Primary Care Provider: Eunice Mora NP        Subjective:     Principal Problem:Acute on chronic respiratory failure with hypoxia        HPI:  Crow Green is a 67 y.o. male patient with a PMHx of asthma, atrial fibrillation with RVR, CHF, chronic obstructive pulmonary disease, DM, elevated PSA, HTN, MI who presents to the Emergency Department for evaluation of mid-sternal chest pain which onset gradually yesterday. Pt also c/o a cough and feeling weak. Symptoms are constant and moderate in severity. No mitigating or exacerbating factors reported. Associated sxs include weakness, cough. Patient denies any n/v/d, fever, chills, and all other sxs at this time.     In the ED O2 sats 86%, RR 30, CBC unremarkable, , Troponin 0.155, CXR showed Left basilar opacity concerning for atelectasis or infection.     Hospital medicine was called and the pt was placed in observation with telemetry monitoring. Pt is a full code - surrogate decision maker is his sister, Gretchen Green.          Overview/Hospital Course:  Mr Green is a 67 year old male who presented to [facility] for evaluation of chest pain. Associated symptoms include cough and shortness of breath. O 2 sats 86 % in the ED, he was placed on supplemental O 2.  , he has been started on Lasix IV. Patient was also started on IV antibiotic for concerning for athelectasis vs infection on chest X ray. As of 8/1/2022, patient continue to experience shortness of breath, cough and chest congestion. Related to Acute CHF vs COPD exacerbation. Cardiology following.        Interval History: Pt continues to experience shortness of breath and cough. Continue current treatment and monitor.     Review of Systems    Constitutional:  Positive for fatigue. Negative for chills, diaphoresis and fever.   HENT: Negative.  Negative for congestion, ear discharge, rhinorrhea, sinus pressure, sore throat and trouble swallowing.    Eyes: Negative.  Negative for discharge and visual disturbance.   Respiratory:  Positive for cough and shortness of breath. Negative for apnea, choking, chest tightness, wheezing and stridor.    Cardiovascular:  Positive for chest pain. Negative for palpitations and leg swelling.   Gastrointestinal: Negative.  Negative for abdominal distention, abdominal pain, diarrhea, nausea and vomiting.   Endocrine: Negative.  Negative for cold intolerance and heat intolerance.   Genitourinary: Negative.  Negative for dysuria, frequency and hematuria.   Musculoskeletal: Negative.  Negative for arthralgias, back pain, myalgias and neck pain.   Skin: Negative.  Negative for pallor and rash.   Allergic/Immunologic: Negative.    Neurological: Negative.  Negative for dizziness, seizures, syncope, weakness and headaches.   Hematological: Negative.    Psychiatric/Behavioral: Negative.  Negative for agitation, confusion and sleep disturbance.    Objective:     Vital Signs (Most Recent):  Temp: 98 °F (36.7 °C) (08/01/22 1141)  Pulse: 63 (08/01/22 1312)  Resp: 18 (08/01/22 1312)  BP: 110/69 (08/01/22 1141)  SpO2: 96 % (08/01/22 1312) Vital Signs (24h Range):  Temp:  [97.4 °F (36.3 °C)-98 °F (36.7 °C)] 98 °F (36.7 °C)  Pulse:  [61-82] 63  Resp:  [12-25] 18  SpO2:  [92 %-98 %] 96 %  BP: ()/(54-69) 110/69     Weight: 81.8 kg (180 lb 5.4 oz)  Body mass index is 30.01 kg/m².    Intake/Output Summary (Last 24 hours) at 8/1/2022 1542  Last data filed at 8/1/2022 0707  Gross per 24 hour   Intake 619.1 ml   Output 500 ml   Net 119.1 ml      Physical Exam  Vitals and nursing note reviewed.   Constitutional:       Appearance: He is well-developed.   HENT:      Head: Normocephalic and atraumatic.   Eyes:      General:         Right eye:  No discharge.         Left eye: No discharge.      Conjunctiva/sclera: Conjunctivae normal.   Neck:      Vascular: No JVD.   Cardiovascular:      Rate and Rhythm: Normal rate and regular rhythm.      Heart sounds: Normal heart sounds. No murmur heard.    No friction rub. No gallop.   Pulmonary:      Effort: Pulmonary effort is normal. No respiratory distress.      Breath sounds: Examination of the right-lower field reveals decreased breath sounds. Examination of the left-lower field reveals decreased breath sounds. Decreased breath sounds present. No wheezing or rales.   Chest:      Chest wall: No tenderness.   Abdominal:      General: Bowel sounds are decreased. There is distension.      Palpations: Abdomen is soft. There is no mass.      Tenderness: There is abdominal tenderness. There is no right CVA tenderness, left CVA tenderness, guarding or rebound.      Hernia: No hernia is present.   Musculoskeletal:         General: No tenderness. Normal range of motion.      Cervical back: Normal range of motion.   Skin:     General: Skin is warm and dry.   Neurological:      Mental Status: He is alert and oriented to person, place, and time.   Psychiatric:         Judgment: Judgment normal.       Significant Labs: All pertinent labs within the past 24 hours have been reviewed.  CMP:   Recent Labs   Lab 07/30/22  2232 07/31/22  0634 08/01/22  0316    142 141  141   K 3.7 3.8 4.1  4.1    106 102  102   CO2 28 28 30*  30*   * 152* 154*  154*   BUN 15 19 19  19   CREATININE 1.1 1.1 1.4  1.4   CALCIUM 8.5* 8.0* 8.4*  8.4*   PROT 6.4 5.9* 6.0   ALBUMIN 2.6* 2.6* 2.5*   BILITOT 0.5 0.5 0.4   ALKPHOS 73 68 67   AST 15 13 12   ALT 20 18 15   ANIONGAP 7* 8 9  9   EGFRNONAA >60 >60  --      POCT Glucose:   Recent Labs   Lab 07/31/22  2036 08/01/22  0535 08/01/22  1140   POCTGLUCOSE 147* 243* 149*       Significant Imaging:     Imaging Results               X-Ray Chest AP Portable (Final result)  Result  time 07/30/22 23:02:37      Final result by Gato Hebert MD (07/30/22 23:02:37)                   Impression:      Left basilar opacity concerning for atelectasis or infection.  Correlation is advised.    This report was flagged in Epic as abnormal.      Electronically signed by: Gato Hebert  Date:    07/30/2022  Time:    23:02               Narrative:    EXAMINATION:  XR CHEST AP PORTABLE    CLINICAL HISTORY:  Chest Pain;    TECHNIQUE:  Single frontal view of the chest was performed.    COMPARISON:  Multiple priors.    FINDINGS:  Left basilar opacity concerning for infection or atelectasis.  Correlation is advised.  Otherwise the lungs appear clear.    The cardiac silhouette is normal in size. The hilar and mediastinal contours are unremarkable.    Left shoulder arthroplasty.                                         Assessment/Plan:      * Acute on chronic respiratory failure with hypoxia  Treat underlying cause PNA vs Volume overload vs COPD    Supplemental oxygen      8/1/2022  WBC and procalcitonin normal   Probable related to Acute CHF and COPD   Will deescalated antibiotics         Acute on chronic combined systolic and diastolic heart failure   Diurese with IV Lasix 40 mg Q 12 hours for now.  - Continue BB   - Strict I&Os, daily weights, Na/fluid restriction.  - Cardiology following         Hypoxia        Pneumonia  Less likely given normal WBCs and afebrile   Procalcitonin pending   Blood and sputum cx   IV rocephin and azithromycin empirically     8/1/2022  WBC and Procalcitonin normal   Desolated antibiotics       Chronic obstructive pulmonary disease  Patient's COPD is with exacerbation noted by continued dyspnea currently.  Patient is currently off COPD Pathway. Continue scheduled inhalers Antibiotics and Supplemental oxygen and monitor respiratory status closely.     Resume home meds   Supplemental oxygen as needed       Elevated troponin  Denies chest pain - EKG unrevealing  Troponin at 0.155 in  ED - stable at baseline  Continue home meds as appropriate  Continuous cardiac monitoring  ECHO LVEF 40-45%  Cardiology following       PAF (paroxysmal atrial fibrillation)  Currently sinus arrythmia - rate controlled  Continue home medications as appropriate  Continue eliquis  Continuous cardiac monitoring         Other chest pain  Cardiology consult  Serial cardiac enzymes, beta blocker, ASA, ACE   Nitrates prn, Oxygen therapy to maintain sats > 92 %  Lipid panel,  2 D ECHO            SANDRITA on CPAP  CPAP qhs       Essential hypertension  Resume home meds   Bp stable         VTE Risk Mitigation (From admission, onward)         Ordered     apixaban tablet 5 mg  2 times daily         07/31/22 0322     IP VTE HIGH RISK PATIENT  Once         07/31/22 0322     Place sequential compression device  Until discontinued         07/31/22 0322     Reason for No Pharmacological VTE Prophylaxis  Once        Question:  Reasons:  Answer:  Already adequately anticoagulated on oral Anticoagulants    07/31/22 0322                Discharge Planning   REJI:      Code Status: Full Code   Is the patient medically ready for discharge?:     Reason for patient still in hospital (select all that apply): Patient trending condition and Treatment  Discharge Plan A: Home Health                  Arnol Xie NP  Department of Hospital Medicine   [facility] - Telemetry ([facility])

## 2022-08-01 NOTE — SUBJECTIVE & OBJECTIVE
Interval History: Pt continues to experience shortness of breath and cough. Continue current treatment and monitor.     Review of Systems   Constitutional:  Positive for fatigue. Negative for chills, diaphoresis and fever.   HENT: Negative.  Negative for congestion, ear discharge, rhinorrhea, sinus pressure, sore throat and trouble swallowing.    Eyes: Negative.  Negative for discharge and visual disturbance.   Respiratory:  Positive for cough and shortness of breath. Negative for apnea, choking, chest tightness, wheezing and stridor.    Cardiovascular:  Positive for chest pain. Negative for palpitations and leg swelling.   Gastrointestinal: Negative.  Negative for abdominal distention, abdominal pain, diarrhea, nausea and vomiting.   Endocrine: Negative.  Negative for cold intolerance and heat intolerance.   Genitourinary: Negative.  Negative for dysuria, frequency and hematuria.   Musculoskeletal: Negative.  Negative for arthralgias, back pain, myalgias and neck pain.   Skin: Negative.  Negative for pallor and rash.   Allergic/Immunologic: Negative.    Neurological: Negative.  Negative for dizziness, seizures, syncope, weakness and headaches.   Hematological: Negative.    Psychiatric/Behavioral: Negative.  Negative for agitation, confusion and sleep disturbance.    Objective:     Vital Signs (Most Recent):  Temp: 98 °F (36.7 °C) (08/01/22 1141)  Pulse: 63 (08/01/22 1312)  Resp: 18 (08/01/22 1312)  BP: 110/69 (08/01/22 1141)  SpO2: 96 % (08/01/22 1312) Vital Signs (24h Range):  Temp:  [97.4 °F (36.3 °C)-98 °F (36.7 °C)] 98 °F (36.7 °C)  Pulse:  [61-82] 63  Resp:  [12-25] 18  SpO2:  [92 %-98 %] 96 %  BP: ()/(54-69) 110/69     Weight: 81.8 kg (180 lb 5.4 oz)  Body mass index is 30.01 kg/m².    Intake/Output Summary (Last 24 hours) at 8/1/2022 1542  Last data filed at 8/1/2022 0707  Gross per 24 hour   Intake 619.1 ml   Output 500 ml   Net 119.1 ml      Physical Exam  Vitals and nursing note reviewed.    Constitutional:       Appearance: He is well-developed.   HENT:      Head: Normocephalic and atraumatic.   Eyes:      General:         Right eye: No discharge.         Left eye: No discharge.      Conjunctiva/sclera: Conjunctivae normal.   Neck:      Vascular: No JVD.   Cardiovascular:      Rate and Rhythm: Normal rate and regular rhythm.      Heart sounds: Normal heart sounds. No murmur heard.    No friction rub. No gallop.   Pulmonary:      Effort: Pulmonary effort is normal. No respiratory distress.      Breath sounds: Examination of the right-lower field reveals decreased breath sounds. Examination of the left-lower field reveals decreased breath sounds. Decreased breath sounds present. No wheezing or rales.   Chest:      Chest wall: No tenderness.   Abdominal:      General: Bowel sounds are decreased. There is distension.      Palpations: Abdomen is soft. There is no mass.      Tenderness: There is abdominal tenderness. There is no right CVA tenderness, left CVA tenderness, guarding or rebound.      Hernia: No hernia is present.   Musculoskeletal:         General: No tenderness. Normal range of motion.      Cervical back: Normal range of motion.   Skin:     General: Skin is warm and dry.   Neurological:      Mental Status: He is alert and oriented to person, place, and time.   Psychiatric:         Judgment: Judgment normal.       Significant Labs: All pertinent labs within the past 24 hours have been reviewed.  CMP:   Recent Labs   Lab 07/30/22  2232 07/31/22  0634 08/01/22  0316    142 141  141   K 3.7 3.8 4.1  4.1    106 102  102   CO2 28 28 30*  30*   * 152* 154*  154*   BUN 15 19 19  19   CREATININE 1.1 1.1 1.4  1.4   CALCIUM 8.5* 8.0* 8.4*  8.4*   PROT 6.4 5.9* 6.0   ALBUMIN 2.6* 2.6* 2.5*   BILITOT 0.5 0.5 0.4   ALKPHOS 73 68 67   AST 15 13 12   ALT 20 18 15   ANIONGAP 7* 8 9  9   EGFRNONAA >60 >60  --      POCT Glucose:   Recent Labs   Lab 07/31/22 2036 08/01/22  0535  08/01/22  1140   POCTGLUCOSE 147* 243* 149*       Significant Imaging:     Imaging Results               X-Ray Chest AP Portable (Final result)  Result time 07/30/22 23:02:37      Final result by Gato Hebert MD (07/30/22 23:02:37)                   Impression:      Left basilar opacity concerning for atelectasis or infection.  Correlation is advised.    This report was flagged in Epic as abnormal.      Electronically signed by: Gato Hebert  Date:    07/30/2022  Time:    23:02               Narrative:    EXAMINATION:  XR CHEST AP PORTABLE    CLINICAL HISTORY:  Chest Pain;    TECHNIQUE:  Single frontal view of the chest was performed.    COMPARISON:  Multiple priors.    FINDINGS:  Left basilar opacity concerning for infection or atelectasis.  Correlation is advised.  Otherwise the lungs appear clear.    The cardiac silhouette is normal in size. The hilar and mediastinal contours are unremarkable.    Left shoulder arthroplasty.

## 2022-08-01 NOTE — PT/OT/SLP EVAL
"Occupational Therapy   Evaluation    Name: Crow Green  MRN: [id number]  Admitting Diagnosis:  Acute on chronic respiratory failure with hypoxia  Recent Surgery: * No surgery found *      Recommendations:     Discharge Recommendations: home health OT  Discharge Equipment Recommendations:  walker, rolling  Barriers to discharge:  None    Assessment:     Crow Green is a 67 y.o. male with a medical diagnosis of Acute on chronic respiratory failure with hypoxia.  He presents with the following performance deficits affecting function: weakness, impaired endurance, impaired self care skills, impaired functional mobility, impaired balance, impaired cardiopulmonary response to activity, decreased safety awareness, pain.      Rehab Prognosis: Good; patient would benefit from acute skilled OT services to address these deficits and reach maximum level of function.       Plan:     Patient to be seen 2 x/week to address the above listed problems via self-care/home management, therapeutic activities, therapeutic exercises  · Plan of Care Expires: 08/15/22  · Plan of Care Reviewed with: patient    Subjective     Chief Complaint: Reported "My shoulder also hurts from a surgery I had a while ago."  Patient/Family Comments/goals: none reported    Occupational Profile:  Living Environment: Patient resides in a 1 story home with no steps to enter, with his father.  Previous level of function: Patient was mod I with ADLs and completed house hold distance ambulation independently.  Roles and Routines: Patient did not drive or work.  Equipment Used at Home:  wheelchair, shower chair, rollator  Assistance upon Discharge: none reported    Pain/Comfort:  · Pain Rating 1: 8/10  · Location 1: back  · Pain Addressed 1:  (activity pacing)    Objective:     Communicated with: [facility], prior to session.  Patient found supine with telemetry, oxygen, peripheral IV, PureWick upon OT entry to room.    General Precautions: Standard, " "fall   Orthopedic Precautions:N/A   Braces: N/A  Respiratory Status: Nasal cannula, flow 2 L/min    Bed Mobility:    · Patient completed Supine to Sit with modified independence and with side rail    Functional Mobility/Transfers:  · Patient completed Sit <> Stand Transfer with stand by assistance  with  rolling walker   · Patient completed Bed <> Chair Transfer using Step Transfer technique with stand by assistance with rolling walker  · Functional Mobility: Patient completed x80ft x2 trials functional mobility with RW and SBA to increase dynamic standing balance and activity tolerance needed for ADL completion.    Activities of Daily Living:  · Grooming: setup .  · Lower Body Dressing: setup while seated EOB    Cognitive/Visual Perceptual:  Cognitive/Psychosocial Skills:     -       Oriented to: Person, Place, Time and Situation   -       Follows Commands/attention:Follows multistep  commands    Physical Exam:  Balance:    -       sitting: WFL, static standing: good, dynamic standing: fair +  Upper Extremity Range of Motion:     -       Right Upper Extremity: WFL  -       Left Upper Extremity: WFL  Upper Extremity Strength:    -       Right Upper Extremity: Deficits: grossly 4+/5  -       Left Upper Extremity: Deficits: grossly 4/5   Strength:    -       Right Upper Extremity: WFL  -       Left Upper Extremity: WFL   Reports having L shdr replacement "recently" States that he has already completed his therapy.    [facility] 6 Click ADL:  AMPAC Total Score: 21    Treatment & Education:  Patient educated on role of OT in acute setting and benefits of participation. Educated on techniques to use to increase independence and decrease fall risk with functional transfers. Educated on importance of OOB activity and calling for A to transfer back to bed. Encouraged completion of B UE AROM therex throughout the day to tolerance to increase functional strength and activity tolerance. Patient stated understanding and in " agreement with POC.  Education:    Patient left up in chair with all lines intact and call button in reach    GOALS:   Multidisciplinary Problems     Occupational Therapy Goals        Problem: Occupational Therapy    Goal Priority Disciplines Outcome Interventions   Occupational Therapy Goal     OT, PT/OT     Description: Goals to be met by: 8/15/22     Patient will increase functional independence with ADLs by performing:    Toileting from toilet with Supervision for hygiene and clothing management.   Toilet transfer to toilet with Supervision.  Increased functional strength in B UE grossly by 1/2 MM grade.                     History:     Past Medical History:   Diagnosis Date    Arthritis     Asthma     Atrial fibrillation     Atrial fibrillation with RVR 8/7/2019    CHF (congestive heart failure)     Chronic obstructive pulmonary disease 8/5/2020    Depression     Dermatomyositis     Diabetes mellitus, type 2 Diagnosed in 2000    Elevated PSA     Follow up 7/30/2020    Hypertension     Myocardial infarction     2017    Sleep apnea        Past Surgical History:   Procedure Laterality Date    ABLATION OF ARRHYTHMOGENIC FOCUS FOR ATRIAL FIBRILLATION N/A 2/27/2020    Procedure: Ablation atrial fibrillation;  Surgeon: Gio Brown MD;  Location: [facility] EP LAB;  Service: Cardiology;  Laterality: N/A;  afib, DAMIEN (cx if SR), PVI, PITO, anes, MB, 3 Prep    CHOLECYSTECTOMY      CLOSURE OF WOUND Left 7/1/2020    Procedure: CLOSURE, WOUND;  Surgeon: Barb Veras DPM;  Location: [facility] OR;  Service: Podiatry;  Laterality: Left;    COLONOSCOPY N/A 3/4/2022    Procedure: COLONOSCOPY;  Surgeon: Yolis Freitas MD;  Location: [facility];  Service: Endoscopy;  Laterality: N/A;    ESOPHAGOGASTRODUODENOSCOPY N/A 3/4/2022    Procedure: EGD (ESOPHAGOGASTRODUODENOSCOPY);  Surgeon: Yolis Freitas MD;  Location: [facility] ENDO;  Service: Endoscopy;  Laterality: N/A;    INJECTION OF ANESTHETIC AGENT INTO  SACROILIAC JOINT Left 3/24/2021    Procedure: Left BLOCK, SACROILIAC JOINT and bilateral rhomboid TPI;  Surgeon: Dillan Tsai MD;  Location: [facility] PAIN MGT;  Service: Pain Management;  Laterality: Left;    INTRALUMINAL GASTROINTESTINAL TRACT IMAGING VIA CAPSULE N/A 3/22/2022    Procedure: IMAGING PROCEDURE, GI TRACT, INTRALUMINAL, VIA CAPSULE;  Surgeon: Justice Martinez RN;  Location: [facility] ENDO;  Service: Endoscopy;  Laterality: N/A;    REVERSE TOTAL SHOULDER ARTHROPLASTY Left 1/10/2022    Procedure: ARTHROPLASTY, SHOULDER, TOTAL, REVERSE;  Surgeon: Anthony Alvarado MD;  Location: [facility] OR;  Service: Orthopedics;  Laterality: Left;  left reverse total shoulder arthroplasty     SELECTIVE INJECTION OF ANESTHETIC AGENT AROUND LUMBAR SPINAL NERVE ROOT BY TRANSFORAMINAL APPROACH Left 6/11/2020    Procedure: BLOCK, SPINAL NERVE ROOT, LUMBAR, SELECTIVE, TRANSFORAMINAL APPROACH Left L4-5, L5-S1 TFESI with RN IV sedation;  Surgeon: Dillan Tsai MD;  Location: [facility] PAIN MGT;  Service: Pain Management;  Laterality: Left;    TOE AMPUTATION Left 6/29/2020    Procedure: AMPUTATION, TOE;  Surgeon: Barb Veras DPM;  Location: [facility] OR;  Service: Podiatry;  Laterality: Left;  great toe       Time Tracking:     OT Date of Treatment: 08/01/22  OT Start Time: 0815  OT Stop Time: 0840  OT Total Time (min): 25 min    Billable Minutes:Evaluation 15  Therapeutic Activity 10    8/1/2022

## 2022-08-01 NOTE — ASSESSMENT & PLAN NOTE
Less likely given normal WBCs and afebrile   Procalcitonin pending   Blood and sputum cx   IV rocephin and azithromycin empirically     8/1/2022  WBC and Procalcitonin normal   Desolated antibiotics

## 2022-08-01 NOTE — PROGRESS NOTES
[facility])  Cardiology  Progress Note    Patient Name: Crow Green  MRN: [id number]  Admission Date: 7/30/2022  Hospital Length of Stay: 0 days  Code Status: Full Code   Attending Physician: Justin Alba MD   Primary Care Physician: Eunice Mora NP  Expected Discharge Date:   Principal Problem:Acute on chronic respiratory failure with hypoxia    Subjective:   HPI:  Crow Green is a 67 y.o. male patient with a PMHx of asthma, atrial fibrillation with RVR, CHF, chronic obstructive pulmonary disease, DM, elevated PSA, HTN, MI who presents to the Emergency Department for evaluation of mid-sternal chest pain which onset gradually yesterday. Pt also c/o a cough and feeling weak. Symptoms are constant and moderate in severity. No mitigating or exacerbating factors reported. Associated sxs include weakness, cough. Patient denies any n/v/d, fever, chills, and all other sxs at this time.   In the ED O2 sats 86%, RR 30, CBC unremarkable, , Troponin 0.155, CXR showed Left basilar opacity concerning for atelectasis or infection.        Cardiology consulted for CHF exac with elevated trop. Trop trending down. ECHO today with stable LV function 40-45 with mild dec RV function, inc CVP, PASP 45 mmHg. Diuresing well now with IV lasix; pt admits to dietary non compliance with adding salt. Has cough now with SOB/mild CP.         Results for orders placed during the hospital encounter of 07/30/22     Echo     Interpretation Summary  · The left ventricle is normal in size with severe concentric hypertrophy and mildly decreased systolic function.  · Mild left atrial enlargement.  · The estimated ejection fraction is 40 - 45%.  · Grade III left ventricular diastolic dysfunction.  · Normal right ventricular size with mildly reduced right ventricular systolic function.  · Mild tricuspid regurgitation.  · Mild pulmonic regurgitation.  · Elevated central venous pressure (15 mmHg).  · The  estimated PA systolic pressure is 45 mmHg.  · There is pulmonary hypertension.          Hospital Course:   8/1/22-Patient seen and examined today, sitting up in bedside chair. Feels poorly. Still complains of SOB and non-productive cough. No chest pain but does endorse rib soreness from coughing spells. Diuresed well overnight. Labs reviewed. Creatinine 1.4. Discussed ischemic evaluation with MPI stress test once respiratory status improved.           Review of Systems   Constitutional: Positive for malaise/fatigue.   HENT: Negative.     Eyes: Negative.    Cardiovascular:  Positive for dyspnea on exertion.   Respiratory:  Positive for shortness of breath.    Endocrine: Negative.    Hematologic/Lymphatic: Negative.    Skin: Negative.    Musculoskeletal:         Rib pain     Gastrointestinal: Negative.    Genitourinary: Negative.    Neurological: Negative.    Psychiatric/Behavioral: Negative.     Allergic/Immunologic: Negative.    Objective:     Vital Signs (Most Recent):  Temp: 97.8 °F (36.6 °C) (08/01/22 0721)  Pulse: 61 (08/01/22 0721)  Resp: 20 (08/01/22 0721)  BP: (!) 98/54 (08/01/22 0721)  SpO2: 97 % (08/01/22 0721)   Vital Signs (24h Range):  Temp:  [97.4 °F (36.3 °C)-98 °F (36.7 °C)] 97.8 °F (36.6 °C)  Pulse:  [61-82] 61  Resp:  [12-25] 20  SpO2:  [92 %-97 %] 97 %  BP: ()/(54-68) 98/54     Weight: 81.8 kg (180 lb 5.4 oz)  Body mass index is 30.01 kg/m².     SpO2: 97 %  O2 Device (Oxygen Therapy): nasal cannula      Intake/Output Summary (Last 24 hours) at 8/1/2022 1110  Last data filed at 8/1/2022 0707  Gross per 24 hour   Intake 619.1 ml   Output 1700 ml   Net -1080.9 ml       Lines/Drains/Airways       Peripheral Intravenous Line  Duration                  Peripheral IV - Single Lumen 20 G Left Forearm -- days                    Physical Exam  Vitals and nursing note reviewed.   Constitutional:       General: He is not in acute distress.     Appearance: Normal appearance. He is well-developed. He is  not diaphoretic.   HENT:      Head: Normocephalic and atraumatic.   Eyes:      General:         Right eye: No discharge.         Left eye: No discharge.      Pupils: Pupils are equal, round, and reactive to light.   Neck:      Thyroid: No thyromegaly.      Vascular: No JVD.      Trachea: No tracheal deviation.   Cardiovascular:      Rate and Rhythm: Normal rate and regular rhythm.      Chest Wall: PMI is not displaced.      Pulses: Intact distal pulses.      Heart sounds: Normal heart sounds, S1 normal and S2 normal. No murmur heard.  Pulmonary:      Effort: Pulmonary effort is normal. No respiratory distress.      Breath sounds: No wheezing.      Comments: Diminished BS    Occasional faint wheeze  Abdominal:      General: There is no distension.      Palpations: Abdomen is soft.      Tenderness: There is no rebound.   Musculoskeletal:      Cervical back: Neck supple.      Right lower leg: No edema.      Left lower leg: No edema.   Skin:     General: Skin is warm and dry.      Findings: No erythema.   Neurological:      General: No focal deficit present.      Mental Status: He is alert and oriented to person, place, and time.   Psychiatric:         Mood and Affect: Mood normal.         Behavior: Behavior normal.         Thought Content: Thought content normal.       Significant Labs: CMP   Recent Labs   Lab 07/30/22 2232 07/31/22  0634 08/01/22  0316    142 141  141   K 3.7 3.8 4.1  4.1    106 102  102   CO2 28 28 30*  30*   * 152* 154*  154*   BUN 15 19 19  19   CREATININE 1.1 1.1 1.4  1.4   CALCIUM 8.5* 8.0* 8.4*  8.4*   PROT 6.4 5.9* 6.0   ALBUMIN 2.6* 2.6* 2.5*   BILITOT 0.5 0.5 0.4   ALKPHOS 73 68 67   AST 15 13 12   ALT 20 18 15   ANIONGAP 7* 8 9  9   ESTGFRAFRICA >60 >60  --    EGFRNONAA >60 >60  --    , CBC   Recent Labs   Lab 07/30/22 2232   WBC 9.04   HGB 10.1*   HCT 31.4*      , Troponin   Recent Labs   Lab 07/30/22 2232 07/31/22  0634 07/31/22  0925   TROPONINI  0.155* 0.151* 0.138*   , and All pertinent lab results from the last 24 hours have been reviewed.    Significant Imaging: Echocardiogram: Transthoracic echo (TTE) complete (Cupid Only):   Results for orders placed or performed during the hospital encounter of 07/30/22   Echo   Result Value Ref Range    BSA 2.03 m2    TDI SEPTAL 0.04 m/s    LV LATERAL E/E' RATIO 26.00 m/s    LV SEPTAL E/E' RATIO 32.50 m/s    LA WIDTH 3.60 cm    IVC diameter 2.26 cm    Left Ventricular Outflow Tract Mean Velocity 0.22632483556422 cm/s    Left Ventricular Outflow Tract Mean Gradient 2.00 mmHg    TDI LATERAL 0.05 m/s    LVIDd 4.08 3.5 - 6.0 cm    IVS 2.06 (A) 0.6 - 1.1 cm    Posterior Wall 1.93 (A) 0.6 - 1.1 cm    Ao root annulus 2.90 cm    LVIDs 3.20 2.1 - 4.0 cm    FS 22 28 - 44 %    LA volume 80.06 cm3    STJ 2.86 cm    Ascending aorta 3.13 cm    LV mass 381.36 g    LA size 4.71 cm    Left Ventricle Relative Wall Thickness 0.95 cm    AV mean gradient 9 mmHg    AV valve area 1.66 cm2    AV Velocity Ratio 0.46     AV index (prosthetic) 0.45     MV valve area p 1/2 method 3.83 cm2    E/A ratio 2.89     Mean e' 0.05 m/s    E wave deceleration time [id number] msec    IVRT 59.213332817388955 msec    LVOT diameter 2.17 cm    LVOT area 3.7 cm2    LVOT peak mathew 0.91 m/s    LVOT peak VTI 18.60 cm    Ao peak mathew 2.00 m/s    Ao VTI 41.3 cm    RVOT peak mathew 0.52 m/s    RVOT peak VTI 9.6 cm    Mr max mathew 4.25 m/s    LVOT stroke volume 68.75 cm3    AV peak gradient 16 mmHg    PV mean gradient 0.64 mmHg    E/E' ratio 28.89 m/s    MV Peak E Mathew 1.30 m/s    TR Max Mathew 2.76 m/s    MV stenosis pressure 1/2 time 57.321652232715968 ms    MV Peak A Mathew 0.45 m/s    LV Systolic Volume 40.99 mL    LV Systolic Volume Index 20.8 mL/m2    LV Diastolic Volume 73.19 mL    LV Diastolic Volume Index 37.15 mL/m2    LA Volume Index 40.6 mL/m2    LV Mass Index 194 g/m2    RA Major Axis 4.41 cm    Left Atrium Minor Axis 5.57 cm    Left Atrium Major Axis 5.54 cm     Triscuspid Valve Regurgitation Peak Gradient 30 mmHg    Right Atrial Pressure (from IVC) 15 mmHg    EF 40 %    TV rest pulmonary artery pressure 45 mmHg    Narrative    · The left ventricle is normal in size with severe concentric hypertrophy   and mildly decreased systolic function.  · Mild left atrial enlargement.  · The estimated ejection fraction is 40 - 45%.  · Grade III left ventricular diastolic dysfunction.  · Normal right ventricular size with mildly reduced right ventricular   systolic function.  · Mild tricuspid regurgitation.  · Mild pulmonic regurgitation.  · Elevated central venous pressure (15 mmHg).  · The estimated PA systolic pressure is 45 mmHg.  · There is pulmonary hypertension.      , EKG: Reviewed, and X-Ray: CXR: X-Ray Chest 1 View (CXR): No results found for this visit on 07/30/22. and X-Ray Chest PA and Lateral (CXR): No results found for this visit on 07/30/22.    Assessment and Plan:   Patient who presents with SOB-multifactorial in setting of CHF, COPD, PNA. Continue current mgmt for now. Needs ischemic evaluation once respiratory status improved.    Pneumonia  Cont tx per primary team     Chronic obstructive pulmonary disease  Cont tx per primary team     Elevated troponin  Sec to demand ischemia; h/o non obs CAD on prior cath  Cont asa, statin, bb, Ranexa  Ischemic workup once diuresed, outpt if stable without CP/SOB    8/1/22  -Continue ASA, statin, BB, Ranexa  -Ischemic evaluation once respiratory status improved    PAF (paroxysmal atrial fibrillation)  -Cont Eliquis and BB  -In NSR    Other chest pain  Sec to CHF  Cont tx per CHF/elevated trop    SANDRITA on CPAP  Cont CPAP    Acute on chronic combined systolic and diastolic heart failure  Cont IV diuresis monitor I/O   Needs low salt compliance  CHF nursing education and f/u CHF clinic upon DC    8/1/22  -Continue IV diuresis for now  -Continue OMT-Toprol XL, ACEi  -Echo showed EF of 40-45%, DD  -Will consider MPI Stress test once  respiratory status improved    Essential hypertension  -Titrate meds         VTE Risk Mitigation (From admission, onward)         Ordered     apixaban tablet 5 mg  2 times daily         07/31/22 0322     IP VTE HIGH RISK PATIENT  Once         07/31/22 0322     Place sequential compression device  Until discontinued         07/31/22 0322     Reason for No Pharmacological VTE Prophylaxis  Once        Question:  Reasons:  Answer:  Already adequately anticoagulated on oral Anticoagulants    07/31/22 0322                Evelyn Bird PA-C  Cardiology  [place] - Telemetry ([facility])

## 2022-08-01 NOTE — PLAN OF CARE
[facility] - Telemetry (Hospital)  Initial Discharge Assessment       Primary Care Provider: Eunice Mora NP    Admission Diagnosis: Hypoxia [R09.02]  Pneumonia of left lower lobe due to infectious organism [J18.9]    Admission Date: 7/30/2022  Expected Discharge Date:     Discharge Barriers Identified: None    Payor: HU[employer] MANAGED MEDICARE / Plan: HU[employer]NA SNP (SPECIAL NEEDS PLAN) / Product Type: Medicare Advantage /     Extended Emergency Contact Information  Primary Emergency Contact: Gretchen Green  Mobile Phone: [phone]  Relation: Sister  Secondary Emergency Contact: Erica Green  Address: [address] [address] [place]  Mobile Phone: [phone]  Relation: Relative    Discharge Plan A: Home Health  Discharge Plan B: Home Health      [facility] (Now [facility]) - [place], [place] - [address]   [address]  [facility] [address]  Phone: [phone] Fax: [phone]    [id number] - [place] - [address]  [address] [address]  Phone: [phone] Fax: [phone]      Initial Assessment (most recent)     Adult Discharge Assessment - 08/01/22 1316        Discharge Assessment    Assessment Type Discharge Planning Assessment     Confirmed/corrected address, phone number and insurance Yes     Source of Information patient;health record     Communicated REJI with patient/caregiver Date not available/Unable to determine     Reason For Admission respiratory failure     Lives With parent(s)     Facility Arrived From: home     Do you expect to return to your current living situation? Yes     Do you have help at home or someone to help you manage your care at home? Yes     Who are your caregiver(s) and their phone number(s)? [facility]     Prior to hospitilization cognitive status: Alert/Oriented     Current cognitive status:  Alert/Oriented     Walking or Climbing Stairs Difficulty ambulation difficulty, requires equipment     Dressing/Bathing Difficulty none     Equipment Currently Used at Home wheelchair;shower chair;rollator     Readmission within 30 days? No     Patient currently being followed by outpatient case management? No     Do you currently have service(s) that help you manage your care at home? Yes     Name and Contact number of agency [facility]     Is the pt/caregiver preference to resume services with current agency Yes     Do you take prescription medications? Yes     Do you have prescription coverage? Yes     Do you have any problems affording any of your prescribed medications? No     Is the patient taking medications as prescribed? yes     How do you get to doctors appointments? family or friend will provide     Are you on dialysis? No     Discharge Plan A Home Health     Discharge Plan B Home Health     Discharge Plan discussed with: Patient     Discharge Barriers Identified None               Anticipated DC Dispo: home with continued [facility]  Prior Level of Function: independent in ADLS, owns walker, w/c and shower chair  PCP: Eunice Mora NP  Comments:  CM spoke with patient to introduce role and discuss d/c planning. Patient lives with his father, he does not own a vehicle. CM inquired regarding transportation upon d/c. Patient is unsure if he has a friend or family member who can provide transportation upon discharge

## 2022-08-01 NOTE — PLAN OF CARE
Plan of care reviewed with pt,verbalized understanding. A&O x4. IV intact, dry, and clean. Medications given with no complications. IV antibiotics given per order. BG monitored. On 2L NC, cpap at night. Pt ambulates with assistance x1, turns in bed independently. Pain monitored and treated per order. IV lasix given per order. NS on monitor. Bed alarm on. Instructed to call for assistance.Hourly rounding completed.

## 2022-08-01 NOTE — PROGRESS NOTES
"Heart Failure [facility](H[facility])    [facility]) [facility] education and introduction to 4-6 week program.      PT aao x 3 while lying in bed. Introduced self to pt as HFTCC nurse navigator.     Patient given "Home Care Guide for Heart Failure Patients" , "Heart Failure [facility]itional [facility]" flyer and "Daily weight and symptom tracker".  Encouraged pt to review information.      Reviewed the following key points of HFTCC program with pt and family:   1.) Take your medications as directed.    2.) Weight yourself daily   3.) Follow low salt and limited fluid diet.    4.) Stop smoking and start exercising   5.) Go to your appointments and call your team.      Pt reminded to follow Symptom tracker and to call at the onset of symptoms according to tracker.     Reviewed plan for follow up once discharged to include phone calls, in person and virtual visits to assist pt optimizing their heart failure medication regimen and encouraging healthy lifestyle modifications.  Reminded pt that program will assist them over the next 4-6 weeks and then patient will be transferred to long term care provider .  Reminded pt how to contact HFTCC navigator via phone and or via [employer].     Pt given appointment or instructed appointment will be printed on hospital discharge paperwork.     Pt also reminded HF nurse will call 48-72 hours after discharge to check on them.     PT  verbalize read back of information given.  Encouraged pt and family to read over information often and contact team with any questions or concerns.   "

## 2022-08-02 LAB
ALBUMIN SERPL BCP-MCNC: 2.6 G/DL (ref 3.5–5.2)
ALP SERPL-CCNC: 67 U/L (ref 55–135)
ALT SERPL W/O P-5'-P-CCNC: 14 U/L (ref 10–44)
ANION GAP SERPL CALC-SCNC: 10 MMOL/L (ref 8–16)
ANION GAP SERPL CALC-SCNC: 10 MMOL/L (ref 8–16)
AST SERPL-CCNC: 15 U/L (ref 10–40)
BASOPHILS # BLD AUTO: 0.01 K/UL (ref 0–0.2)
BASOPHILS NFR BLD: 0.1 % (ref 0–1.9)
BILIRUB SERPL-MCNC: 0.3 MG/DL (ref 0.1–1)
BNP SERPL-MCNC: 201 PG/ML (ref 0–99)
BUN SERPL-MCNC: 23 MG/DL (ref 8–23)
BUN SERPL-MCNC: 23 MG/DL (ref 8–23)
CALCIUM SERPL-MCNC: 8.5 MG/DL (ref 8.7–10.5)
CALCIUM SERPL-MCNC: 8.5 MG/DL (ref 8.7–10.5)
CHLORIDE SERPL-SCNC: 104 MMOL/L (ref 95–110)
CHLORIDE SERPL-SCNC: 104 MMOL/L (ref 95–110)
CO2 SERPL-SCNC: 25 MMOL/L (ref 23–29)
CO2 SERPL-SCNC: 25 MMOL/L (ref 23–29)
CREAT SERPL-MCNC: 1.3 MG/DL (ref 0.5–1.4)
CREAT SERPL-MCNC: 1.3 MG/DL (ref 0.5–1.4)
DIFFERENTIAL METHOD: ABNORMAL
EOSINOPHIL # BLD AUTO: 0.2 K/UL (ref 0–0.5)
EOSINOPHIL NFR BLD: 3.2 % (ref 0–8)
ERYTHROCYTE [DISTWIDTH] IN BLOOD BY AUTOMATED COUNT: 17.5 % (ref 11.5–14.5)
EST. GFR  (NO RACE VARIABLE): >60 ML/MIN/1.73 M^2
EST. GFR  (NO RACE VARIABLE): >60 ML/MIN/1.73 M^2
GLUCOSE SERPL-MCNC: 178 MG/DL (ref 70–110)
GLUCOSE SERPL-MCNC: 178 MG/DL (ref 70–110)
HCT VFR BLD AUTO: 35.7 % (ref 40–54)
HGB BLD-MCNC: 10.7 G/DL (ref 14–18)
IMM GRANULOCYTES # BLD AUTO: 0.02 K/UL (ref 0–0.04)
IMM GRANULOCYTES NFR BLD AUTO: 0.3 % (ref 0–0.5)
LYMPHOCYTES # BLD AUTO: 1.3 K/UL (ref 1–4.8)
LYMPHOCYTES NFR BLD: 19.4 % (ref 18–48)
MCH RBC QN AUTO: 26.2 PG (ref 27–31)
MCHC RBC AUTO-ENTMCNC: 30 G/DL (ref 32–36)
MCV RBC AUTO: 87 FL (ref 82–98)
MONOCYTES # BLD AUTO: 0.7 K/UL (ref 0.3–1)
MONOCYTES NFR BLD: 9.6 % (ref 4–15)
NEUTROPHILS # BLD AUTO: 4.7 K/UL (ref 1.8–7.7)
NEUTROPHILS NFR BLD: 67.4 % (ref 38–73)
NRBC BLD-RTO: 0 /100 WBC
PLATELET # BLD AUTO: 107 K/UL (ref 150–450)
PLATELET BLD QL SMEAR: ABNORMAL
PMV BLD AUTO: 11.3 FL (ref 9.2–12.9)
POCT GLUCOSE: 123 MG/DL (ref 70–110)
POCT GLUCOSE: 195 MG/DL (ref 70–110)
POCT GLUCOSE: 203 MG/DL (ref 70–110)
POTASSIUM SERPL-SCNC: 4.5 MMOL/L (ref 3.5–5.1)
POTASSIUM SERPL-SCNC: 4.5 MMOL/L (ref 3.5–5.1)
PROT SERPL-MCNC: 6.5 G/DL (ref 6–8.4)
RBC # BLD AUTO: 4.09 M/UL (ref 4.6–6.2)
SODIUM SERPL-SCNC: 139 MMOL/L (ref 136–145)
SODIUM SERPL-SCNC: 139 MMOL/L (ref 136–145)
WBC # BLD AUTO: 6.9 K/UL (ref 3.9–12.7)

## 2022-08-02 PROCEDURE — 96376 TX/PRO/DX INJ SAME DRUG ADON: CPT

## 2022-08-02 PROCEDURE — 63600175 PHARM REV CODE 636 W HCPCS: Performed by: NURSE PRACTITIONER

## 2022-08-02 PROCEDURE — 94640 AIRWAY INHALATION TREATMENT: CPT | Mod: XB

## 2022-08-02 PROCEDURE — 96366 THER/PROPH/DIAG IV INF ADDON: CPT

## 2022-08-02 PROCEDURE — 99232 PR SUBSEQUENT HOSPITAL CARE,LEVL II: ICD-10-PCS | Mod: ,,, | Performed by: PHYSICIAN ASSISTANT

## 2022-08-02 PROCEDURE — 99900035 HC TECH TIME PER 15 MIN (STAT)

## 2022-08-02 PROCEDURE — 83880 ASSAY OF NATRIURETIC PEPTIDE: CPT | Performed by: PHYSICIAN ASSISTANT

## 2022-08-02 PROCEDURE — 94761 N-INVAS EAR/PLS OXIMETRY MLT: CPT

## 2022-08-02 PROCEDURE — 63600175 PHARM REV CODE 636 W HCPCS: Performed by: FAMILY MEDICINE

## 2022-08-02 PROCEDURE — 99232 SBSQ HOSP IP/OBS MODERATE 35: CPT | Mod: ,,, | Performed by: PHYSICIAN ASSISTANT

## 2022-08-02 PROCEDURE — 80053 COMPREHEN METABOLIC PANEL: CPT | Performed by: NURSE PRACTITIONER

## 2022-08-02 PROCEDURE — 25000003 PHARM REV CODE 250: Performed by: NURSE PRACTITIONER

## 2022-08-02 PROCEDURE — 36415 COLL VENOUS BLD VENIPUNCTURE: CPT | Performed by: NURSE PRACTITIONER

## 2022-08-02 PROCEDURE — 21400001 HC TELEMETRY ROOM

## 2022-08-02 PROCEDURE — 25000242 PHARM REV CODE 250 ALT 637 W/ HCPCS: Performed by: INTERNAL MEDICINE

## 2022-08-02 PROCEDURE — 94660 CPAP INITIATION&MGMT: CPT

## 2022-08-02 PROCEDURE — 27000221 HC OXYGEN, UP TO 24 HOURS

## 2022-08-02 PROCEDURE — 96375 TX/PRO/DX INJ NEW DRUG ADDON: CPT

## 2022-08-02 PROCEDURE — 85025 COMPLETE CBC W/AUTO DIFF WBC: CPT | Performed by: FAMILY MEDICINE

## 2022-08-02 PROCEDURE — 36415 COLL VENOUS BLD VENIPUNCTURE: CPT | Performed by: PHYSICIAN ASSISTANT

## 2022-08-02 RX ORDER — DOXYCYCLINE HYCLATE 100 MG
100 TABLET ORAL EVERY 12 HOURS
Status: DISCONTINUED | OUTPATIENT
Start: 2022-08-02 | End: 2022-08-03 | Stop reason: HOSPADM

## 2022-08-02 RX ORDER — METHYLPREDNISOLONE ACETATE 80 MG/ML
80 INJECTION, SUSPENSION INTRA-ARTICULAR; INTRALESIONAL; INTRAMUSCULAR; SOFT TISSUE EVERY 8 HOURS
Status: DISCONTINUED | OUTPATIENT
Start: 2022-08-02 | End: 2022-08-02

## 2022-08-02 RX ORDER — ADHESIVE BANDAGE
30 BANDAGE TOPICAL DAILY PRN
Status: DISCONTINUED | OUTPATIENT
Start: 2022-08-02 | End: 2022-08-03 | Stop reason: HOSPADM

## 2022-08-02 RX ORDER — METHYLPREDNISOLONE SOD SUCC 125 MG
80 VIAL (EA) INJECTION EVERY 8 HOURS
Status: COMPLETED | OUTPATIENT
Start: 2022-08-02 | End: 2022-08-03

## 2022-08-02 RX ORDER — INSULIN ASPART 100 [IU]/ML
1-10 INJECTION, SOLUTION INTRAVENOUS; SUBCUTANEOUS
Status: DISCONTINUED | OUTPATIENT
Start: 2022-08-02 | End: 2022-08-03 | Stop reason: HOSPADM

## 2022-08-02 RX ORDER — TALC
9 POWDER (GRAM) TOPICAL NIGHTLY PRN
Status: DISCONTINUED | OUTPATIENT
Start: 2022-08-02 | End: 2022-08-03 | Stop reason: HOSPADM

## 2022-08-02 RX ADMIN — GABAPENTIN 800 MG: 400 CAPSULE ORAL at 10:08

## 2022-08-02 RX ADMIN — BUDESONIDE 0.5 MG: 0.5 INHALANT ORAL at 07:08

## 2022-08-02 RX ADMIN — FUROSEMIDE 40 MG: 10 INJECTION, SOLUTION INTRAMUSCULAR; INTRAVENOUS at 09:08

## 2022-08-02 RX ADMIN — SERTRALINE HYDROCHLORIDE 100 MG: 50 TABLET ORAL at 10:08

## 2022-08-02 RX ADMIN — ASPIRIN 81 MG: 81 TABLET, COATED ORAL at 09:08

## 2022-08-02 RX ADMIN — INSULIN ASPART 4 UNITS: 100 INJECTION, SOLUTION INTRAVENOUS; SUBCUTANEOUS at 04:08

## 2022-08-02 RX ADMIN — RANOLAZINE 1000 MG: 500 TABLET, EXTENDED RELEASE ORAL at 10:08

## 2022-08-02 RX ADMIN — METHYLPREDNISOLONE SODIUM SUCCINATE 80 MG: 125 INJECTION, POWDER, FOR SOLUTION INTRAMUSCULAR; INTRAVENOUS at 02:08

## 2022-08-02 RX ADMIN — INSULIN ASPART 4 UNITS: 100 INJECTION, SOLUTION INTRAVENOUS; SUBCUTANEOUS at 10:08

## 2022-08-02 RX ADMIN — PANTOPRAZOLE SODIUM 40 MG: 40 TABLET, DELAYED RELEASE ORAL at 09:08

## 2022-08-02 RX ADMIN — IPRATROPIUM BROMIDE AND ALBUTEROL SULFATE 3 ML: 2.5; .5 SOLUTION RESPIRATORY (INHALATION) at 07:08

## 2022-08-02 RX ADMIN — GABAPENTIN 800 MG: 400 CAPSULE ORAL at 02:08

## 2022-08-02 RX ADMIN — ARFORMOTEROL TARTRATE 15 MCG: 15 SOLUTION RESPIRATORY (INHALATION) at 07:08

## 2022-08-02 RX ADMIN — RANOLAZINE 1000 MG: 500 TABLET, EXTENDED RELEASE ORAL at 09:08

## 2022-08-02 RX ADMIN — TAMSULOSIN HYDROCHLORIDE 0.4 MG: 0.4 CAPSULE ORAL at 09:08

## 2022-08-02 RX ADMIN — GABAPENTIN 800 MG: 400 CAPSULE ORAL at 09:08

## 2022-08-02 RX ADMIN — DOXYCYCLINE HYCLATE 100 MG: 100 TABLET, COATED ORAL at 10:08

## 2022-08-02 RX ADMIN — METHYLPREDNISOLONE SODIUM SUCCINATE 80 MG: 125 INJECTION, POWDER, FOR SOLUTION INTRAMUSCULAR; INTRAVENOUS at 10:08

## 2022-08-02 RX ADMIN — ISOSORBIDE MONONITRATE 60 MG: 60 TABLET, EXTENDED RELEASE ORAL at 10:08

## 2022-08-02 RX ADMIN — TRAZODONE HYDROCHLORIDE 200 MG: 100 TABLET ORAL at 10:08

## 2022-08-02 RX ADMIN — ATORVASTATIN CALCIUM 40 MG: 40 TABLET, FILM COATED ORAL at 10:08

## 2022-08-02 RX ADMIN — FUROSEMIDE 40 MG: 10 INJECTION, SOLUTION INTRAMUSCULAR; INTRAVENOUS at 10:08

## 2022-08-02 RX ADMIN — IPRATROPIUM BROMIDE AND ALBUTEROL SULFATE 3 ML: 2.5; .5 SOLUTION RESPIRATORY (INHALATION) at 12:08

## 2022-08-02 RX ADMIN — DOXYCYCLINE HYCLATE 100 MG: 100 TABLET, COATED ORAL at 11:08

## 2022-08-02 RX ADMIN — APIXABAN 5 MG: 2.5 TABLET, FILM COATED ORAL at 09:08

## 2022-08-02 RX ADMIN — APIXABAN 5 MG: 2.5 TABLET, FILM COATED ORAL at 10:08

## 2022-08-02 RX ADMIN — ISOSORBIDE MONONITRATE 60 MG: 60 TABLET, EXTENDED RELEASE ORAL at 09:08

## 2022-08-02 RX ADMIN — GUAIFENESIN AND DEXTROMETHORPHAN 5 ML: 100; 10 SYRUP ORAL at 09:08

## 2022-08-02 RX ADMIN — AZITHROMYCIN MONOHYDRATE 500 MG: 500 INJECTION, POWDER, LYOPHILIZED, FOR SOLUTION INTRAVENOUS at 04:08

## 2022-08-02 RX ADMIN — CEFTRIAXONE 1 G: 1 INJECTION, SOLUTION INTRAVENOUS at 12:08

## 2022-08-02 RX ADMIN — METOPROLOL SUCCINATE 50 MG: 50 TABLET, EXTENDED RELEASE ORAL at 10:08

## 2022-08-02 RX ADMIN — LISINOPRIL 10 MG: 10 TABLET ORAL at 09:08

## 2022-08-02 NOTE — SUBJECTIVE & OBJECTIVE
Interval History: Plan Stress Test on tomorrow.    Review of Systems   Constitutional:  Positive for fatigue. Negative for chills, diaphoresis and fever.   HENT:  Positive for congestion. Negative for ear discharge, rhinorrhea, sinus pressure, sore throat and trouble swallowing.    Eyes: Negative.  Negative for discharge and visual disturbance.   Respiratory:  Positive for cough and shortness of breath. Negative for apnea, choking, chest tightness, wheezing and stridor.    Cardiovascular:  Positive for chest pain. Negative for palpitations and leg swelling.   Gastrointestinal: Negative.  Negative for abdominal distention, abdominal pain, diarrhea, nausea and vomiting.   Endocrine: Negative.  Negative for cold intolerance and heat intolerance.   Genitourinary: Negative.  Negative for dysuria, frequency and hematuria.   Musculoskeletal: Negative.  Negative for arthralgias, back pain, myalgias and neck pain.   Skin: Negative.  Negative for pallor and rash.   Allergic/Immunologic: Negative.    Neurological: Negative.  Negative for dizziness, seizures, syncope, weakness and headaches.   Hematological: Negative.    Psychiatric/Behavioral: Negative.  Negative for agitation, confusion and sleep disturbance.    Objective:     Vital Signs (Most Recent):  Temp: 98.8 °F (37.1 °C) (08/02/22 1146)  Pulse: 73 (08/02/22 1200)  Resp: 20 (08/02/22 1200)  BP: 134/66 (08/02/22 1146)  SpO2: 98 % (08/02/22 1200) Vital Signs (24h Range):  Temp:  [96 °F (35.6 °C)-98.8 °F (37.1 °C)] 98.8 °F (37.1 °C)  Pulse:  [68-82] 73  Resp:  [17-27] 20  SpO2:  [92 %-98 %] 98 %  BP: (120-138)/(58-75) 134/66     Weight: 93.2 kg (205 lb 7.5 oz)  Body mass index is 34.19 kg/m².    Intake/Output Summary (Last 24 hours) at 8/2/2022 1505  Last data filed at 8/2/2022 1230  Gross per 24 hour   Intake 535.89 ml   Output 4200 ml   Net -3664.11 ml      Physical Exam  Vitals and nursing note reviewed.   Constitutional:       General: He is not in acute distress.      Appearance: He is well-developed. He is not ill-appearing, toxic-appearing or diaphoretic.   HENT:      Head: Normocephalic and atraumatic.   Eyes:      General:         Right eye: No discharge.         Left eye: No discharge.      Conjunctiva/sclera: Conjunctivae normal.   Neck:      Vascular: No JVD.   Cardiovascular:      Rate and Rhythm: Normal rate and regular rhythm.      Heart sounds: Normal heart sounds. No murmur heard.    No friction rub. No gallop.   Pulmonary:      Effort: Pulmonary effort is normal. No respiratory distress.      Breath sounds: Rhonchi (mild) present. No wheezing or rales.   Chest:      Chest wall: No tenderness.   Abdominal:      General: Bowel sounds are decreased. There is distension.      Palpations: Abdomen is soft. There is no mass.      Tenderness: There is abdominal tenderness. There is no right CVA tenderness, left CVA tenderness, guarding or rebound.      Hernia: No hernia is present.   Musculoskeletal:         General: No tenderness. Normal range of motion.      Cervical back: Normal range of motion.   Skin:     General: Skin is warm and dry.   Neurological:      Mental Status: He is alert and oriented to person, place, and time.   Psychiatric:         Judgment: Judgment normal.       Significant Labs: All pertinent labs within the past 24 hours have been reviewed.  CBC:   Recent Labs   Lab 08/01/22  1608 08/02/22  0527   WBC 7.02 6.90   HGB 9.7* 10.7*   HCT 31.4* 35.7*    107*     CMP:   Recent Labs   Lab 08/01/22  0316 08/02/22  0527     141 139  139   K 4.1  4.1 4.5  4.5     102 104  104   CO2 30*  30* 25  25   *  154* 178*  178*   BUN 19  19 23  23   CREATININE 1.4  1.4 1.3  1.3   CALCIUM 8.4*  8.4* 8.5*  8.5*   PROT 6.0 6.5   ALBUMIN 2.5* 2.6*   BILITOT 0.4 0.3   ALKPHOS 67 67   AST 12 15   ALT 15 14   ANIONGAP 9  9 10  10     Cardiac Markers:   Recent Labs   Lab 08/02/22  0812   *       Significant Imaging:     Imaging  Results               X-Ray Chest AP Portable (Final result)  Result time 07/30/22 23:02:37      Final result by Gato Hebert MD (07/30/22 23:02:37)                   Impression:      Left basilar opacity concerning for atelectasis or infection.  Correlation is advised.    This report was flagged in Epic as abnormal.      Electronically signed by: Gato Hebert  Date:    07/30/2022  Time:    23:02               Narrative:    EXAMINATION:  XR CHEST AP PORTABLE    CLINICAL HISTORY:  Chest Pain;    TECHNIQUE:  Single frontal view of the chest was performed.    COMPARISON:  Multiple priors.    FINDINGS:  Left basilar opacity concerning for infection or atelectasis.  Correlation is advised.  Otherwise the lungs appear clear.    The cardiac silhouette is normal in size. The hilar and mediastinal contours are unremarkable.    Left shoulder arthroplasty.

## 2022-08-02 NOTE — SUBJECTIVE & OBJECTIVE
Review of Systems   Constitutional: Positive for malaise/fatigue.   HENT: Negative.     Eyes: Negative.    Cardiovascular:  Positive for dyspnea on exertion.   Respiratory:  Positive for cough and shortness of breath.    Endocrine: Negative.    Hematologic/Lymphatic: Negative.    Skin: Negative.    Musculoskeletal: Negative.    Gastrointestinal: Negative.    Genitourinary: Negative.    Neurological: Negative.    Psychiatric/Behavioral: Negative.     Allergic/Immunologic: Negative.    Objective:     Vital Signs (Most Recent):  Temp: 98.8 °F (37.1 °C) (08/02/22 1146)  Pulse: 73 (08/02/22 1200)  Resp: 20 (08/02/22 1200)  BP: 134/66 (08/02/22 1146)  SpO2: 98 % (08/02/22 1200) Vital Signs (24h Range):  Temp:  [96 °F (35.6 °C)-98.8 °F (37.1 °C)] 98.8 °F (37.1 °C)  Pulse:  [63-82] 73  Resp:  [17-27] 20  SpO2:  [92 %-98 %] 98 %  BP: (120-138)/(58-75) 134/66     Weight: 93.2 kg (205 lb 7.5 oz)  Body mass index is 34.19 kg/m².     SpO2: 98 %  O2 Device (Oxygen Therapy): nasal cannula      Intake/Output Summary (Last 24 hours) at 8/2/2022 1203  Last data filed at 8/2/2022 0900  Gross per 24 hour   Intake 295.89 ml   Output 3500 ml   Net -3204.11 ml       Lines/Drains/Airways       Peripheral Intravenous Line  Duration                  Peripheral IV - Single Lumen 20 G Left Forearm -- days                    Physical Exam  Vitals and nursing note reviewed.   Constitutional:       General: He is not in acute distress.     Appearance: Normal appearance. He is well-developed. He is not diaphoretic.   HENT:      Head: Normocephalic and atraumatic.   Eyes:      General:         Right eye: No discharge.         Left eye: No discharge.      Pupils: Pupils are equal, round, and reactive to light.   Neck:      Thyroid: No thyromegaly.      Vascular: No JVD.      Trachea: No tracheal deviation.   Cardiovascular:      Rate and Rhythm: Normal rate and regular rhythm.      Heart sounds: Normal heart sounds, S1 normal and S2 normal. No  murmur heard.  Pulmonary:      Effort: Pulmonary effort is normal. No respiratory distress.      Breath sounds: Rhonchi present. No wheezing.   Abdominal:      General: There is no distension.      Palpations: Abdomen is soft.      Tenderness: There is no rebound.   Musculoskeletal:      Cervical back: Neck supple.      Right lower leg: No edema.      Left lower leg: No edema.   Skin:     General: Skin is warm and dry.      Findings: No erythema.   Neurological:      Mental Status: He is alert and oriented to person, place, and time.   Psychiatric:         Mood and Affect: Mood normal.         Behavior: Behavior normal.         Thought Content: Thought content normal.       Significant Labs: CMP   Recent Labs   Lab 08/01/22  0316 08/02/22  0527     141 139  139   K 4.1  4.1 4.5  4.5     102 104  104   CO2 30*  30* 25  25   *  154* 178*  178*   BUN 19  19 23  23   CREATININE 1.4  1.4 1.3  1.3   CALCIUM 8.4*  8.4* 8.5*  8.5*   PROT 6.0 6.5   ALBUMIN 2.5* 2.6*   BILITOT 0.4 0.3   ALKPHOS 67 67   AST 12 15   ALT 15 14   ANIONGAP 9  9 10  10   , CBC   Recent Labs   Lab 08/01/22  1608 08/02/22  0527   WBC 7.02 6.90   HGB 9.7* 10.7*   HCT 31.4* 35.7*    107*   , Troponin No results for input(s): TROPONINI in the last 48 hours., and All pertinent lab results from the last 24 hours have been reviewed.    Significant Imaging: Echocardiogram: Transthoracic echo (TTE) complete (Cupid Only):   Results for orders placed or performed during the hospital encounter of 07/30/22   Echo   Result Value Ref Range    BSA 2.03 m2    TDI SEPTAL 0.04 m/s    LV LATERAL E/E' RATIO 26.00 m/s    LV SEPTAL E/E' RATIO 32.50 m/s    LA WIDTH 3.60 cm    IVC diameter 2.26 cm    Left Ventricular Outflow Tract Mean Velocity 0.44436672673541 cm/s    Left Ventricular Outflow Tract Mean Gradient 2.00 mmHg    TDI LATERAL 0.05 m/s    LVIDd 4.08 3.5 - 6.0 cm    IVS 2.06 (A) 0.6 - 1.1 cm    Posterior Wall 1.93 (A)  0.6 - 1.1 cm    Ao root annulus 2.90 cm    LVIDs 3.20 2.1 - 4.0 cm    FS 22 28 - 44 %    LA volume 80.06 cm3    STJ 2.86 cm    Ascending aorta 3.13 cm    LV mass 381.36 g    LA size 4.71 cm    Left Ventricle Relative Wall Thickness 0.95 cm    AV mean gradient 9 mmHg    AV valve area 1.66 cm2    AV Velocity Ratio 0.46     AV index (prosthetic) 0.45     MV valve area p 1/2 method 3.83 cm2    E/A ratio 2.89     Mean e' 0.05 m/s    E wave deceleration time 198.779493383797063 msec    IVRT 59.463418636052735 msec    LVOT diameter 2.17 cm    LVOT area 3.7 cm2    LVOT peak mathew 0.91 m/s    LVOT peak VTI 18.60 cm    Ao peak mathew 2.00 m/s    Ao VTI 41.3 cm    RVOT peak mathew 0.52 m/s    RVOT peak VTI 9.6 cm    Mr max mathew 4.25 m/s    LVOT stroke volume 68.75 cm3    AV peak gradient 16 mmHg    PV mean gradient 0.64 mmHg    E/E' ratio 28.89 m/s    MV Peak E Mathew 1.30 m/s    TR Max Mathew 2.76 m/s    MV stenosis pressure 1/2 time 57.821149049010903 ms    MV Peak A Mathew 0.45 m/s    LV Systolic Volume 40.99 mL    LV Systolic Volume Index 20.8 mL/m2    LV Diastolic Volume 73.19 mL    LV Diastolic Volume Index 37.15 mL/m2    LA Volume Index 40.6 mL/m2    LV Mass Index 194 g/m2    RA Major Axis 4.41 cm    Left Atrium Minor Axis 5.57 cm    Left Atrium Major Axis 5.54 cm    Triscuspid Valve Regurgitation Peak Gradient 30 mmHg    Right Atrial Pressure (from IVC) 15 mmHg    EF 40 %    TV rest pulmonary artery pressure 45 mmHg    Narrative    · The left ventricle is normal in size with severe concentric hypertrophy   and mildly decreased systolic function.  · Mild left atrial enlargement.  · The estimated ejection fraction is 40 - 45%.  · Grade III left ventricular diastolic dysfunction.  · Normal right ventricular size with mildly reduced right ventricular   systolic function.  · Mild tricuspid regurgitation.  · Mild pulmonic regurgitation.  · Elevated central venous pressure (15 mmHg).  · The estimated PA systolic pressure is 45 mmHg.  · There  is pulmonary hypertension.      , EKG: Reviewed, and X-Ray: CXR: X-Ray Chest 1 View (CXR): No results found for this visit on 07/30/22. and X-Ray Chest PA and Lateral (CXR): No results found for this visit on 07/30/22.

## 2022-08-02 NOTE — ASSESSMENT & PLAN NOTE
Sec to demand ischemia; h/o non obs CAD on prior cath  Cont asa, statin, bb, Ranexa  Ischemic workup once diuresed, outpt if stable without CP/SOB    8/1/22  -Continue ASA, statin, BB, Ranexa  -Ischemic evaluation once respiratory status improved    8/2/22  -Continue ASA, statin, BB, Ranexa  -MPI stress test tmw

## 2022-08-02 NOTE — PROGRESS NOTES
[facility] Telemetry ([facility])  [facility] Medicine  Progress Note    Patient Name: Crow Green  MRN: [id number]  Patient Class: OP- Observation   Admission Date: 7/30/2022  Length of Stay: 0 days  Attending Physician: Justin Alba MD  Primary Care Provider: Eunice Mora NP        Subjective:     Principal Problem:Acute on chronic respiratory failure with hypoxia        HPI:  Crow Green is a 67 y.o. male patient with a PMHx of asthma, atrial fibrillation with RVR, CHF, chronic obstructive pulmonary disease, DM, elevated PSA, HTN, MI who presents to the Emergency Department for evaluation of mid-sternal chest pain which onset gradually yesterday. Pt also c/o a cough and feeling weak. Symptoms are constant and moderate in severity. No mitigating or exacerbating factors reported. Associated sxs include weakness, cough. Patient denies any n/v/d, fever, chills, and all other sxs at this time.     In the ED O2 sats 86%, RR 30, CBC unremarkable, , Troponin 0.155, CXR showed Left basilar opacity concerning for atelectasis or infection.     Hospital medicine was called and the pt was placed in observation with telemetry monitoring. Pt is a full code - surrogate decision maker is his sister, Gretchen Green.          Overview/Hospital Course:  Mr Green is a 67 year old male who presented to [facility] for evaluation of chest pain. Associated symptoms include cough and shortness of breath. O 2 sats 86 % in the ED, he was placed on supplemental O 2.  , he has been started on Lasix IV. Patient was also started on IV antibiotic for concerning for athelectasis vs infection on chest X ray. As of 8/1/2022, patient continue to experience shortness of breath, cough and chest congestion. Related to Acute CHF vs COPD exacerbation. Cardiology following. As of 8/2/2022, vital signs and labs stable. Patient reports symptoms starting to improving. Cardiology following, plan Stress Test on tomorrow.        Interval History: Plan Stress Test on tomorrow.    Review of Systems   Constitutional:  Positive for fatigue. Negative for chills, diaphoresis and fever.   HENT:  Positive for congestion. Negative for ear discharge, rhinorrhea, sinus pressure, sore throat and trouble swallowing.    Eyes: Negative.  Negative for discharge and visual disturbance.   Respiratory:  Positive for cough and shortness of breath. Negative for apnea, choking, chest tightness, wheezing and stridor.    Cardiovascular:  Positive for chest pain. Negative for palpitations and leg swelling.   Gastrointestinal: Negative.  Negative for abdominal distention, abdominal pain, diarrhea, nausea and vomiting.   Endocrine: Negative.  Negative for cold intolerance and heat intolerance.   Genitourinary: Negative.  Negative for dysuria, frequency and hematuria.   Musculoskeletal: Negative.  Negative for arthralgias, back pain, myalgias and neck pain.   Skin: Negative.  Negative for pallor and rash.   Allergic/Immunologic: Negative.    Neurological: Negative.  Negative for dizziness, seizures, syncope, weakness and headaches.   Hematological: Negative.    Psychiatric/Behavioral: Negative.  Negative for agitation, confusion and sleep disturbance.    Objective:     Vital Signs (Most Recent):  Temp: 98.8 °F (37.1 °C) (08/02/22 1146)  Pulse: 73 (08/02/22 1200)  Resp: 20 (08/02/22 1200)  BP: 134/66 (08/02/22 1146)  SpO2: 98 % (08/02/22 1200) Vital Signs (24h Range):  Temp:  [96 °F (35.6 °C)-98.8 °F (37.1 °C)] 98.8 °F (37.1 °C)  Pulse:  [68-82] 73  Resp:  [17-27] 20  SpO2:  [92 %-98 %] 98 %  BP: (120-138)/(58-75) 134/66     Weight: 93.2 kg (205 lb 7.5 oz)  Body mass index is 34.19 kg/m².    Intake/Output Summary (Last 24 hours) at 8/2/2022 1505  Last data filed at 8/2/2022 1230  Gross per 24 hour   Intake 535.89 ml   Output 4200 ml   Net -3664.11 ml      Physical Exam  Vitals and nursing note reviewed.   Constitutional:       General: He is not in acute  distress.     Appearance: He is well-developed. He is not ill-appearing, toxic-appearing or diaphoretic.   HENT:      Head: Normocephalic and atraumatic.   Eyes:      General:         Right eye: No discharge.         Left eye: No discharge.      Conjunctiva/sclera: Conjunctivae normal.   Neck:      Vascular: No JVD.   Cardiovascular:      Rate and Rhythm: Normal rate and regular rhythm.      Heart sounds: Normal heart sounds. No murmur heard.    No friction rub. No gallop.   Pulmonary:      Effort: Pulmonary effort is normal. No respiratory distress.      Breath sounds: Rhonchi (mild) present. No wheezing or rales.   Chest:      Chest wall: No tenderness.   Abdominal:      General: Bowel sounds are decreased. There is distension.      Palpations: Abdomen is soft. There is no mass.      Tenderness: There is abdominal tenderness. There is no right CVA tenderness, left CVA tenderness, guarding or rebound.      Hernia: No hernia is present.   Musculoskeletal:         General: No tenderness. Normal range of motion.      Cervical back: Normal range of motion.   Skin:     General: Skin is warm and dry.   Neurological:      Mental Status: He is alert and oriented to person, place, and time.   Psychiatric:         Judgment: Judgment normal.       Significant Labs: All pertinent labs within the past 24 hours have been reviewed.  CBC:   Recent Labs   Lab 08/01/22  1608 08/02/22  0527   WBC 7.02 6.90   HGB 9.7* 10.7*   HCT 31.4* 35.7*    107*     CMP:   Recent Labs   Lab 08/01/22  0316 08/02/22  0527     141 139  139   K 4.1  4.1 4.5  4.5     102 104  104   CO2 30*  30* 25  25   *  154* 178*  178*   BUN 19  19 23  23   CREATININE 1.4  1.4 1.3  1.3   CALCIUM 8.4*  8.4* 8.5*  8.5*   PROT 6.0 6.5   ALBUMIN 2.5* 2.6*   BILITOT 0.4 0.3   ALKPHOS 67 67   AST 12 15   ALT 15 14   ANIONGAP 9  9 10  10     Cardiac Markers:   Recent Labs   Lab 08/02/22  0812   *       Significant Imaging:      Imaging Results               X-Ray Chest AP Portable (Final result)  Result time 07/30/22 23:02:37      Final result by Gato Hebert MD (07/30/22 23:02:37)                   Impression:      Left basilar opacity concerning for atelectasis or infection.  Correlation is advised.    This report was flagged in Epic as abnormal.      Electronically signed by: Gato Hebert  Date:    07/30/2022  Time:    23:02               Narrative:    EXAMINATION:  XR CHEST AP PORTABLE    CLINICAL HISTORY:  Chest Pain;    TECHNIQUE:  Single frontal view of the chest was performed.    COMPARISON:  Multiple priors.    FINDINGS:  Left basilar opacity concerning for infection or atelectasis.  Correlation is advised.  Otherwise the lungs appear clear.    The cardiac silhouette is normal in size. The hilar and mediastinal contours are unremarkable.    Left shoulder arthroplasty.                                         Assessment/Plan:      * Acute on chronic respiratory failure with hypoxia  Treat underlying cause PNA vs Volume overload vs COPD    Supplemental oxygen      8/1/2022  WBC and procalcitonin normal   Probable related to Acute CHF and COPD   Will deescalated antibiotics          8/2/2022  Pt with continue chest congestion,  Plan Stress Test tomorrow   Case review with Cardiology   Will give IV steriod x 3 dose and assess response          Acute on chronic combined systolic and diastolic heart failure   Diurese with IV Lasix 40 mg Q 12 hours for now.  - Continue BB   - Strict I&Os, daily weights, Na/fluid restriction.  - Cardiology following       8/2/2022  Cardiology following     Continue current treatment plan   Plan stress Test tomorrow         Hypoxia        Pneumonia  Less likely given normal WBCs and afebrile   Procalcitonin pending   Blood and sputum cx   IV rocephin and azithromycin empirically     8/1/2022  WBC and Procalcitonin normal   Desolated antibiotics       Chronic obstructive pulmonary  disease  Patient's COPD is with exacerbation noted by continued dyspnea currently.  Patient is currently off COPD Pathway. Continue scheduled inhalers Antibiotics and Supplemental oxygen and monitor respiratory status closely.     Resume home meds   Supplemental oxygen as needed       Elevated troponin  Denies chest pain - EKG unrevealing  Troponin at 0.155 in ED - stable at baseline  Continue home meds as appropriate  Continuous cardiac monitoring  ECHO LVEF 40-45%  Cardiology following       PAF (paroxysmal atrial fibrillation)  Currently sinus arrythmia - rate controlled  Continue home medications as appropriate  Continue eliquis  Continuous cardiac monitoring         Other chest pain  Cardiology consult  Serial cardiac enzymes, beta blocker, ASA, ACE   Nitrates prn, Oxygen therapy to maintain sats > 92 %  Lipid panel,  2 D ECHO            SANDRITA on CPAP  CPAP qhs       Essential hypertension  Resume home meds   Bp stable         VTE Risk Mitigation (From admission, onward)         Ordered     apixaban tablet 5 mg  2 times daily         07/31/22 0322     IP VTE HIGH RISK PATIENT  Once         07/31/22 0322     Place sequential compression device  Until discontinued         07/31/22 0322     Reason for No Pharmacological VTE Prophylaxis  Once        Question:  Reasons:  Answer:  Already adequately anticoagulated on oral Anticoagulants    07/31/22 0322                Discharge Planning   REJI:      Code Status: Full Code   Is the patient medically ready for discharge?:     Reason for patient still in hospital (select all that apply): Patient trending condition and Treatment  Discharge Plan A: Home Health                  Arnol Xie NP  Department of Hospital Medicine   [facility] - Telemetry ([facility])

## 2022-08-02 NOTE — ASSESSMENT & PLAN NOTE
Treat underlying cause PNA vs Volume overload vs COPD    Supplemental oxygen      8/1/2022  WBC and procalcitonin normal   Probable related to Acute CHF and COPD   Will deescalated antibiotics          8/2/2022  Pt with continue chest congestion,  Plan Stress Test tomorrow   Case review with Cardiology   Will give IV steriod x 3 dose and assess response

## 2022-08-02 NOTE — PROGRESS NOTES
[facility]  Cardiology  Progress Note    Patient Name: Crow Green  MRN: [id number]  Admission Date: 7/30/2022  Hospital Length of Stay: 0 days  Code Status: Full Code   Attending Physician: Justin Alba MD   Primary Care Physician: Eunice Mora NP  Expected Discharge Date:   Principal Problem:Acute on chronic respiratory failure with hypoxia    Subjective:   HPI:  Crow Green is a 67 y.o. male patient with a PMHx of asthma, atrial fibrillation with RVR, CHF, chronic obstructive pulmonary disease, DM, elevated PSA, HTN, MI who presents to the Emergency Department for evaluation of mid-sternal chest pain which onset gradually yesterday. Pt also c/o a cough and feeling weak. Symptoms are constant and moderate in severity. No mitigating or exacerbating factors reported. Associated sxs include weakness, cough. Patient denies any n/v/d, fever, chills, and all other sxs at this time.   In the ED O2 sats 86%, RR 30, CBC unremarkable, , Troponin 0.155, CXR showed Left basilar opacity concerning for atelectasis or infection.        Cardiology consulted for CHF exac with elevated trop. Trop trending down. ECHO today with stable LV function 40-45 with mild dec RV function, inc CVP, PASP 45 mmHg. Diuresing well now with IV lasix; pt admits to dietary non compliance with adding salt. Has cough now with SOB/mild CP.         Results for orders placed during the hospital encounter of 07/30/22     Echo     Interpretation Summary  · The left ventricle is normal in size with severe concentric hypertrophy and mildly decreased systolic function.  · Mild left atrial enlargement.  · The estimated ejection fraction is 40 - 45%.  · Grade III left ventricular diastolic dysfunction.  · Normal right ventricular size with mildly reduced right ventricular systolic function.  · Mild tricuspid regurgitation.  · Mild pulmonic regurgitation.  · Elevated central venous pressure (15 mmHg).  · The  estimated PA systolic pressure is 45 mmHg.  · There is pulmonary hypertension.    Hospital Course:   8/1/22-Patient seen and examined today, sitting up in bedside chair. Feels poorly. Still complains of SOB and non-productive cough. No chest pain but does endorse rib soreness from coughing spells. Diuresed well overnight. Labs reviewed. Creatinine 1.4. Discussed ischemic evaluation with MPI stress test once respiratory status improved.     8/2/22-Patient seen and examined today, lying in bed. Feels ok. Still having coughing spells. SOB slowly improving. BNP trending down, creatinine stable. MPI stress test tmw.           Review of Systems   Constitutional: Positive for malaise/fatigue.   HENT: Negative.     Eyes: Negative.    Cardiovascular:  Positive for dyspnea on exertion.   Respiratory:  Positive for cough and shortness of breath.    Endocrine: Negative.    Hematologic/Lymphatic: Negative.    Skin: Negative.    Musculoskeletal: Negative.    Gastrointestinal: Negative.    Genitourinary: Negative.    Neurological: Negative.    Psychiatric/Behavioral: Negative.     Allergic/Immunologic: Negative.    Objective:     Vital Signs (Most Recent):  Temp: 98.8 °F (37.1 °C) (08/02/22 1146)  Pulse: 73 (08/02/22 1200)  Resp: 20 (08/02/22 1200)  BP: 134/66 (08/02/22 1146)  SpO2: 98 % (08/02/22 1200) Vital Signs (24h Range):  Temp:  [96 °F (35.6 °C)-98.8 °F (37.1 °C)] 98.8 °F (37.1 °C)  Pulse:  [63-82] 73  Resp:  [17-27] 20  SpO2:  [92 %-98 %] 98 %  BP: (120-138)/(58-75) 134/66     Weight: 93.2 kg (205 lb 7.5 oz)  Body mass index is 34.19 kg/m².     SpO2: 98 %  O2 Device (Oxygen Therapy): nasal cannula      Intake/Output Summary (Last 24 hours) at 8/2/2022 1203  Last data filed at 8/2/2022 0900  Gross per 24 hour   Intake 295.89 ml   Output 3500 ml   Net -3204.11 ml       Lines/Drains/Airways       Peripheral Intravenous Line  Duration                  Peripheral IV - Single Lumen 20 G Left Forearm -- days                     Physical Exam  Vitals and nursing note reviewed.   Constitutional:       General: He is not in acute distress.     Appearance: Normal appearance. He is well-developed. He is not diaphoretic.   HENT:      Head: Normocephalic and atraumatic.   Eyes:      General:         Right eye: No discharge.         Left eye: No discharge.      Pupils: Pupils are equal, round, and reactive to light.   Neck:      Thyroid: No thyromegaly.      Vascular: No JVD.      Trachea: No tracheal deviation.   Cardiovascular:      Rate and Rhythm: Normal rate and regular rhythm.      Heart sounds: Normal heart sounds, S1 normal and S2 normal. No murmur heard.  Pulmonary:      Effort: Pulmonary effort is normal. No respiratory distress.      Breath sounds: Rhonchi present. No wheezing.   Abdominal:      General: There is no distension.      Palpations: Abdomen is soft.      Tenderness: There is no rebound.   Musculoskeletal:      Cervical back: Neck supple.      Right lower leg: No edema.      Left lower leg: No edema.   Skin:     General: Skin is warm and dry.      Findings: No erythema.   Neurological:      Mental Status: He is alert and oriented to person, place, and time.   Psychiatric:         Mood and Affect: Mood normal.         Behavior: Behavior normal.         Thought Content: Thought content normal.       Significant Labs: CMP   Recent Labs   Lab 08/01/22  0316 08/02/22  0527     141 139  139   K 4.1  4.1 4.5  4.5     102 104  104   CO2 30*  30* 25  25   *  154* 178*  178*   BUN 19  19 23  23   CREATININE 1.4  1.4 1.3  1.3   CALCIUM 8.4*  8.4* 8.5*  8.5*   PROT 6.0 6.5   ALBUMIN 2.5* 2.6*   BILITOT 0.4 0.3   ALKPHOS 67 67   AST 12 15   ALT 15 14   ANIONGAP 9  9 10  10   , CBC   Recent Labs   Lab 08/01/22  1608 08/02/22  0527   WBC 7.02 6.90   HGB 9.7* 10.7*   HCT 31.4* 35.7*    107*   , Troponin No results for input(s): TROPONINI in the last 48 hours., and All pertinent lab results  from the last 24 hours have been reviewed.    Significant Imaging: Echocardiogram: Transthoracic echo (TTE) complete ([facility] Only):   Results for orders placed or performed during the hospital encounter of 07/30/22   Echo   Result Value Ref Range    BSA 2.03 m2    TDI SEPTAL 0.04 m/s    LV LATERAL E/E' RATIO 26.00 m/s    LV SEPTAL E/E' RATIO 32.50 m/s    LA WIDTH 3.60 cm    IVC diameter 2.26 cm    Left Ventricular Outflow Tract Mean Velocity 0.54554262743427 cm/s    Left Ventricular Outflow Tract Mean Gradient 2.00 mmHg    TDI LATERAL 0.05 m/s    LVIDd 4.08 3.5 - 6.0 cm    IVS 2.06 (A) 0.6 - 1.1 cm    Posterior Wall 1.93 (A) 0.6 - 1.1 cm    Ao root annulus 2.90 cm    LVIDs 3.20 2.1 - 4.0 cm    FS 22 28 - 44 %    LA volume 80.06 cm3    STJ 2.86 cm    Ascending aorta 3.13 cm    LV mass 381.36 g    LA size 4.71 cm    Left Ventricle Relative Wall Thickness 0.95 cm    AV mean gradient 9 mmHg    AV valve area 1.66 cm2    AV Velocity Ratio 0.46     AV index (prosthetic) 0.45     MV valve area p 1/2 method 3.83 cm2    E/A ratio 2.89     Mean e' 0.05 m/s    E wave deceleration time 198.730402781114242 msec    IVRT 59.844355286032189 msec    LVOT diameter 2.17 cm    LVOT area 3.7 cm2    LVOT peak mathew 0.91 m/s    LVOT peak VTI 18.60 cm    Ao peak mathew 2.00 m/s    Ao VTI 41.3 cm    RVOT peak mathew 0.52 m/s    RVOT peak VTI 9.6 cm    Mr max mathew 4.25 m/s    LVOT stroke volume 68.75 cm3    AV peak gradient 16 mmHg    PV mean gradient 0.64 mmHg    E/E' ratio 28.89 m/s    MV Peak E Mathew 1.30 m/s    TR Max Mathew 2.76 m/s    MV stenosis pressure 1/2 time 57.142722630418460 ms    MV Peak A Mathew 0.45 m/s    LV Systolic Volume 40.99 mL    LV Systolic Volume Index 20.8 mL/m2    LV Diastolic Volume 73.19 mL    LV Diastolic Volume Index 37.15 mL/m2    LA Volume Index 40.6 mL/m2    LV Mass Index 194 g/m2    RA Major Axis 4.41 cm    Left Atrium Minor Axis 5.57 cm    Left Atrium Major Axis 5.54 cm    Triscuspid Valve Regurgitation Peak Gradient 30  mmHg    Right Atrial Pressure (from IVC) 15 mmHg    EF 40 %    TV rest pulmonary artery pressure 45 mmHg    Narrative    · The left ventricle is normal in size with severe concentric hypertrophy   and mildly decreased systolic function.  · Mild left atrial enlargement.  · The estimated ejection fraction is 40 - 45%.  · Grade III left ventricular diastolic dysfunction.  · Normal right ventricular size with mildly reduced right ventricular   systolic function.  · Mild tricuspid regurgitation.  · Mild pulmonic regurgitation.  · Elevated central venous pressure (15 mmHg).  · The estimated PA systolic pressure is 45 mmHg.  · There is pulmonary hypertension.      , EKG: Reviewed, and X-Ray: CXR: X-Ray Chest 1 View (CXR): No results found for this visit on 07/30/22. and X-Ray Chest PA and Lateral (CXR): No results found for this visit on 07/30/22.    Assessment and Plan:   Patient who presents with SOB, multifactorial. Clinically improving, diuresing well. Continue OMT for now. MPI stress test tmw.     Pneumonia  -Cont tx per primary team     Chronic obstructive pulmonary disease  Cont tx per primary team     Elevated troponin  Sec to demand ischemia; h/o non obs CAD on prior cath  Cont asa, statin, bb, Ranexa  Ischemic workup once diuresed, outpt if stable without CP/SOB    8/1/22  -Continue ASA, statin, BB, Ranexa  -Ischemic evaluation once respiratory status improved    8/2/22  -Continue ASA, statin, BB, Ranexa  -MPI stress test tmw    PAF (paroxysmal atrial fibrillation)  -Cont Eliquis and BB  -In NSR    Other chest pain  Sec to CHF  Cont tx per CHF/elevated trop    SANDRITA on CPAP  Cont CPAP    Acute on chronic combined systolic and diastolic heart failure  Cont IV diuresis monitor I/O   Needs low salt compliance  CHF nursing education and f/u CHF clinic upon DC    8/1/22  -Continue IV diuresis for now  -Continue OMT-Toprol XL, ACEi  -Echo showed EF of 40-45%, DD  -Will consider MPI Stress test once respiratory status  improved    8/2/22  -Clinically improved, BNP trending down  -Will transition to po Lasix tomorrow  -Continue Toprol XL, ACEi  -MPI stress test tmw    Essential hypertension  -Titrate meds         VTE Risk Mitigation (From admission, onward)         Ordered     apixaban tablet 5 mg  2 times daily         07/31/22 0322     IP VTE HIGH RISK PATIENT  Once         07/31/22 0322     Place sequential compression device  Until discontinued         07/31/22 0322     Reason for No Pharmacological VTE Prophylaxis  Once        Question:  Reasons:  Answer:  Already adequately anticoagulated on oral Anticoagulants    07/31/22 0322                Evelyn Bird PA-C  Cardiology  [facility] - Telemetry ([facility])

## 2022-08-02 NOTE — ASSESSMENT & PLAN NOTE
Cont IV diuresis monitor I/O   Needs low salt compliance  CHF nursing education and f/u CHF clinic upon DC    8/1/22  -Continue IV diuresis for now  -Continue OMT-Toprol XL, ACEi  -Echo showed EF of 40-45%, DD  -Will consider MPI Stress test once respiratory status improved    8/2/22  -Clinically improved, BNP trending down  -Will transition to po Lasix tomorrow  -Continue Toprol XL, ACEi  -MPI stress test tmw

## 2022-08-02 NOTE — PLAN OF CARE
Plan of Care: RN Shift Summary & Bedside Handover Report  08/02/2022 1:53 AM    Pt admitted on 7/30/2022 for Acute on chronic respiratory failure with hypoxia under Justin Alba MD service   Code Status Full Code    Past Medical/ Surgical Hx Past Medical History:   Diagnosis Date    Arthritis     Asthma     Atrial fibrillation     Atrial fibrillation with RVR 8/7/2019    CHF (congestive heart failure)     Chronic obstructive pulmonary disease 8/5/2020    Depression     Dermatomyositis     Diabetes mellitus, type 2 Diagnosed in 2000    Elevated PSA     Follow up 7/30/2020    Hypertension     Myocardial infarction     2017    Sleep apnea       Past Surgical History:   Procedure Laterality Date    ABLATION OF ARRHYTHMOGENIC FOCUS FOR ATRIAL FIBRILLATION N/A 2/27/2020    Procedure: Ablation atrial fibrillation;  Surgeon: Gio Brown MD;  Location: [facility] EP LAB;  Service: Cardiology;  Laterality: N/A;  afib, DAMIEN (cx if SR), PVI, PITO, anes, MB, 3 Prep    CHOLECYSTECTOMY      CLOSURE OF WOUND Left 7/1/2020    Procedure: CLOSURE, WOUND;  Surgeon: Barb Veras DPM;  Location: [facility] OR;  Service: Podiatry;  Laterality: Left;    COLONOSCOPY N/A 3/4/2022    Procedure: COLONOSCOPY;  Surgeon: Yolis Freitas MD;  Location: [facility];  Service: Endoscopy;  Laterality: N/A;    ESOPHAGOGASTRODUODENOSCOPY N/A 3/4/2022    Procedure: EGD (ESOPHAGOGASTRODUODENOSCOPY);  Surgeon: Yolis Freitas MD;  Location: [facility] ENDO;  Service: Endoscopy;  Laterality: N/A;    INJECTION OF ANESTHETIC AGENT INTO SACROILIAC JOINT Left 3/24/2021    Procedure: Left BLOCK, SACROILIAC JOINT and bilateral rhomboid TPI;  Surgeon: Dillan Tsai MD;  Location: [facility] PAIN MGT;  Service: Pain Management;  Laterality: Left;    INTRALUMINAL GASTROINTESTINAL TRACT IMAGING VIA CAPSULE N/A 3/22/2022    Procedure: IMAGING PROCEDURE, GI TRACT, INTRALUMINAL, VIA CAPSULE;  Surgeon: Justice Martinez RN;  Location: [facility] ENDO;  Service: Endoscopy;  Laterality:  N/A;    REVERSE TOTAL SHOULDER ARTHROPLASTY Left 1/10/2022    Procedure: ARTHROPLASTY, SHOULDER, TOTAL, REVERSE;  Surgeon: Anthony Alvarado MD;  Location: [facility];  Service: Orthopedics;  Laterality: Left;  left reverse total shoulder arthroplasty     SELECTIVE INJECTION OF ANESTHETIC AGENT AROUND LUMBAR SPINAL NERVE ROOT BY TRANSFORAMINAL APPROACH Left 6/11/2020    Procedure: BLOCK, SPINAL NERVE ROOT, LUMBAR, SELECTIVE, TRANSFORAMINAL APPROACH Left L4-5, L5-S1 TFESI with RN IV sedation;  Surgeon: Dillan Tsai MD;  Location: [facility] PAIN T;  Service: Pain Management;  Laterality: Left;    TOE AMPUTATION Left 6/29/2020    Procedure: AMPUTATION, TOE;  Surgeon: Barb Veras DPM;  Location: [facility] OR;  Service: Podiatry;  Laterality: Left;  great toe       HEENT HEENT WDL: WDL, vision aid   Cognitive/ Neuro Cognitive/Neuro/Behavioral WDL: WDL  Level of Consciousness (AVPU): alert     Malika Coma Scale Score: 15  Additional Documentation: [place] Coma Scale (Group)   Resp Respiratory WDL: WDL except, breath sounds, cough  All Lung Fields Breath Sounds: diminished  Flow (L/min): 1  Oxygen Concentration (%): 30  O2 Device (Oxygen Therapy): CPAP   Cardiac Cardiac WDL: WDL except  Lead Monitored: Lead II  Rhythm: conduction defect     Cardiac/Telemetry Box Number: [id number]   Peripheral/ Neurovascular Peripheral Neurovascular WDL: WDL   VTE core VTE Required Core Measure: Pharmacological prophylaxis initiated/maintained   GI GI WDL: WDL except, appearance/characteristics  All Quadrants Bowel Sounds: audible and normoactive  Last Bowel Movement: 07/31/22   Diet Diet Low Sodium, 2gm [facility]; Fluid - 1500mL    Genitourinary WDL: WDL         Skin Skin WDL: WDL  Skin Integrity: intact   Collin Score Collin Score: 21   Musculoskeletal  Musculoskeletal WDL: WDL except, mobility  General Mobility: mildly impaired   Safety Safety WDL: WDL  Safety Factors: ID band on, upper side rails raised x 2, call light in reach,  wheels locked, bed in low position      Fall Risk Score Fall Risk Score: 14   LDA(s) Lines/Drains/Airways       Peripheral Intravenous Line  Duration                  Peripheral IV - Single Lumen 20 G Left Forearm -- days                   Consults Consults (From admission, onward)          Status Ordering Provider     Inpatient consult to Social Work/Case Management  Once        Provider:  (Not yet assigned)    Completed BETTYE DUCKWORTH     Inpatient consult to Registered Dietitian/Nutritionist  Once        Provider:  (Not yet assigned)    Completed BETTYE DUCKWORTH     Inpatient consult to Cardiology  Once        Provider:  Franklin Drake MD    Completed MAIA KEITA            Shift events uneventful   Plan of Care for RN report  Continue care as ordered, IV antibiotics, lasix, respiratory management, DM management    Discharge Planning Discharge Plan A: Home Health    Patient updated on plan of care, all questions answered up to now regarding plan of care, will continue to monitor per hourly rounding & unit practice/ policy    Note: Other exception details noted in flowsheet if not present in summary

## 2022-08-02 NOTE — ASSESSMENT & PLAN NOTE
Diurese with IV Lasix 40 mg Q 12 hours for now.  - Continue BB   - Strict I&Os, daily weights, Na/fluid restriction.  - Cardiology following       8/2/2022  Cardiology following     Continue current treatment plan   Plan stress Test tomorrow

## 2022-08-03 VITALS
DIASTOLIC BLOOD PRESSURE: 67 MMHG | OXYGEN SATURATION: 97 % | HEIGHT: 65 IN | BODY MASS INDEX: 32.99 KG/M2 | HEART RATE: 78 BPM | RESPIRATION RATE: 18 BRPM | TEMPERATURE: 98 F | SYSTOLIC BLOOD PRESSURE: 134 MMHG | WEIGHT: 198 LBS

## 2022-08-03 LAB
ANION GAP SERPL CALC-SCNC: 9 MMOL/L (ref 8–16)
BUN SERPL-MCNC: 34 MG/DL (ref 8–23)
CALCIUM SERPL-MCNC: 8.8 MG/DL (ref 8.7–10.5)
CHLORIDE SERPL-SCNC: 101 MMOL/L (ref 95–110)
CO2 SERPL-SCNC: 30 MMOL/L (ref 23–29)
CREAT SERPL-MCNC: 1.4 MG/DL (ref 0.5–1.4)
CV STRESS BASE HR: 71 BPM
DIASTOLIC BLOOD PRESSURE: 74 MMHG
EJECTION FRACTION- HIGH: 73 %
END DIASTOLIC INDEX-HIGH: 165 ML/M2
END DIASTOLIC INDEX-LOW: 101 ML/M2
END SYSTOLIC INDEX-HIGH: 64 ML/M2
END SYSTOLIC INDEX-LOW: 28 ML/M2
EST. GFR  (NO RACE VARIABLE): 56 ML/MIN/1.73 M^2
GLUCOSE SERPL-MCNC: 238 MG/DL (ref 70–110)
NUC REST EJECTION FRACTION: 50
NUC STRESS EJECTION FRACTION: 49 %
OHS CV CPX 85 PERCENT MAX PREDICTED HEART RATE MALE: 132
OHS CV CPX MAX PREDICTED HEART RATE: 155
OHS CV CPX PATIENT IS FEMALE: 0
OHS CV CPX PATIENT IS MALE: 1
OHS CV CPX PEAK DIASTOLIC BLOOD PRESSURE: 74 MMHG
OHS CV CPX PEAK HEAR RATE: 75 BPM
OHS CV CPX PEAK RATE PRESSURE PRODUCT: 9825
OHS CV CPX PEAK SYSTOLIC BLOOD PRESSURE: 131 MMHG
OHS CV CPX PERCENT MAX PREDICTED HEART RATE ACHIEVED: 48
OHS CV CPX RATE PRESSURE PRODUCT PRESENTING: 9301
POCT GLUCOSE: 193 MG/DL (ref 70–110)
POCT GLUCOSE: 243 MG/DL (ref 70–110)
POCT GLUCOSE: 316 MG/DL (ref 70–110)
POCT GLUCOSE: 409 MG/DL (ref 70–110)
POTASSIUM SERPL-SCNC: 5.2 MMOL/L (ref 3.5–5.1)
RETIRED EF AND QEF - SEE NOTES: 59 %
SODIUM SERPL-SCNC: 140 MMOL/L (ref 136–145)
SYSTOLIC BLOOD PRESSURE: 131 MMHG

## 2022-08-03 PROCEDURE — 94640 AIRWAY INHALATION TREATMENT: CPT

## 2022-08-03 PROCEDURE — 25000242 PHARM REV CODE 250 ALT 637 W/ HCPCS: Performed by: INTERNAL MEDICINE

## 2022-08-03 PROCEDURE — 99900035 HC TECH TIME PER 15 MIN (STAT)

## 2022-08-03 PROCEDURE — 27000221 HC OXYGEN, UP TO 24 HOURS

## 2022-08-03 PROCEDURE — 25000003 PHARM REV CODE 250: Performed by: PHYSICIAN ASSISTANT

## 2022-08-03 PROCEDURE — 63600175 PHARM REV CODE 636 W HCPCS: Performed by: FAMILY MEDICINE

## 2022-08-03 PROCEDURE — 99232 SBSQ HOSP IP/OBS MODERATE 35: CPT | Mod: ,,, | Performed by: PHYSICIAN ASSISTANT

## 2022-08-03 PROCEDURE — 80048 BASIC METABOLIC PNL TOTAL CA: CPT | Performed by: NURSE PRACTITIONER

## 2022-08-03 PROCEDURE — 94660 CPAP INITIATION&MGMT: CPT

## 2022-08-03 PROCEDURE — 25000003 PHARM REV CODE 250: Performed by: NURSE PRACTITIONER

## 2022-08-03 PROCEDURE — 36415 COLL VENOUS BLD VENIPUNCTURE: CPT | Performed by: NURSE PRACTITIONER

## 2022-08-03 PROCEDURE — 99232 PR SUBSEQUENT HOSPITAL CARE,LEVL II: ICD-10-PCS | Mod: ,,, | Performed by: PHYSICIAN ASSISTANT

## 2022-08-03 PROCEDURE — 94761 N-INVAS EAR/PLS OXIMETRY MLT: CPT

## 2022-08-03 PROCEDURE — 97116 GAIT TRAINING THERAPY: CPT | Mod: CQ

## 2022-08-03 PROCEDURE — 97530 THERAPEUTIC ACTIVITIES: CPT

## 2022-08-03 RX ORDER — REGADENOSON 0.08 MG/ML
0.4 INJECTION, SOLUTION INTRAVENOUS ONCE
Status: COMPLETED | OUTPATIENT
Start: 2022-08-03 | End: 2022-08-03

## 2022-08-03 RX ORDER — FUROSEMIDE 40 MG/1
40 TABLET ORAL DAILY
Status: DISCONTINUED | OUTPATIENT
Start: 2022-08-03 | End: 2022-08-03 | Stop reason: HOSPADM

## 2022-08-03 RX ORDER — DOXYCYCLINE HYCLATE 100 MG
100 TABLET ORAL EVERY 12 HOURS
Qty: 4 TABLET | Refills: 0 | Status: SHIPPED | OUTPATIENT
Start: 2022-08-03 | End: 2022-08-05

## 2022-08-03 RX ADMIN — INSULIN ASPART 10 UNITS: 100 INJECTION, SOLUTION INTRAVENOUS; SUBCUTANEOUS at 11:08

## 2022-08-03 RX ADMIN — ASPIRIN 81 MG: 81 TABLET, COATED ORAL at 08:08

## 2022-08-03 RX ADMIN — BUDESONIDE 0.5 MG: 0.5 INHALANT ORAL at 06:08

## 2022-08-03 RX ADMIN — GABAPENTIN 800 MG: 400 CAPSULE ORAL at 02:08

## 2022-08-03 RX ADMIN — APIXABAN 5 MG: 2.5 TABLET, FILM COATED ORAL at 08:08

## 2022-08-03 RX ADMIN — REGADENOSON 0.4 MG: 0.08 INJECTION, SOLUTION INTRAVENOUS at 10:08

## 2022-08-03 RX ADMIN — LISINOPRIL 10 MG: 10 TABLET ORAL at 08:08

## 2022-08-03 RX ADMIN — IPRATROPIUM BROMIDE AND ALBUTEROL SULFATE 3 ML: 2.5; .5 SOLUTION RESPIRATORY (INHALATION) at 06:08

## 2022-08-03 RX ADMIN — DOXYCYCLINE HYCLATE 100 MG: 100 TABLET, COATED ORAL at 08:08

## 2022-08-03 RX ADMIN — IPRATROPIUM BROMIDE AND ALBUTEROL SULFATE 3 ML: 2.5; .5 SOLUTION RESPIRATORY (INHALATION) at 01:08

## 2022-08-03 RX ADMIN — INSULIN ASPART 2 UNITS: 100 INJECTION, SOLUTION INTRAVENOUS; SUBCUTANEOUS at 06:08

## 2022-08-03 RX ADMIN — ACETAMINOPHEN 1000 MG: 500 TABLET ORAL at 08:08

## 2022-08-03 RX ADMIN — INSULIN HUMAN 8 UNITS: 100 INJECTION, SOLUTION PARENTERAL at 12:08

## 2022-08-03 RX ADMIN — FUROSEMIDE 40 MG: 40 TABLET ORAL at 10:08

## 2022-08-03 RX ADMIN — ARFORMOTEROL TARTRATE 15 MCG: 15 SOLUTION RESPIRATORY (INHALATION) at 06:08

## 2022-08-03 RX ADMIN — RANOLAZINE 1000 MG: 500 TABLET, EXTENDED RELEASE ORAL at 08:08

## 2022-08-03 RX ADMIN — IPRATROPIUM BROMIDE AND ALBUTEROL SULFATE 3 ML: 2.5; .5 SOLUTION RESPIRATORY (INHALATION) at 12:08

## 2022-08-03 RX ADMIN — ISOSORBIDE MONONITRATE 60 MG: 60 TABLET, EXTENDED RELEASE ORAL at 08:08

## 2022-08-03 RX ADMIN — TAMSULOSIN HYDROCHLORIDE 0.4 MG: 0.4 CAPSULE ORAL at 08:08

## 2022-08-03 RX ADMIN — METHYLPREDNISOLONE SODIUM SUCCINATE 80 MG: 125 INJECTION, POWDER, FOR SOLUTION INTRAMUSCULAR; INTRAVENOUS at 06:08

## 2022-08-03 RX ADMIN — GABAPENTIN 800 MG: 400 CAPSULE ORAL at 08:08

## 2022-08-03 RX ADMIN — PANTOPRAZOLE SODIUM 40 MG: 40 TABLET, DELAYED RELEASE ORAL at 08:08

## 2022-08-03 NOTE — PLAN OF CARE
Secure chat sent to [facility] regarding home oxygen approval and delivery. Awaiting return message.

## 2022-08-03 NOTE — PLAN OF CARE
Pt AAOx4. VSS. Pt remained free of falls this shift. No complaints of pain or discomfort. Medications administered as ordered. Administered IV lasix and steriod. Pt is a-fib on monitor. Hourly rounding completed. Pt instructed to call for assistance. POC reviewed. Pt verbalized understanding. Pt understood to be NPO for today's stress test.

## 2022-08-03 NOTE — PLAN OF CARE
Patient has returned to baseline mod I functional status. Continued skilled OT intervention in acute not warranted. Continue to recommend HHOT to ensure safe transition home.

## 2022-08-03 NOTE — PT/OT/SLP PROGRESS
Physical Therapy      Patient Name:  Crow Green   MRN:  [id number]    Patient attempted for PT treatment twice in the morning @1001 and @1131. Pt out of room for testing during both attempts. Will follow-up in the afternoon.

## 2022-08-03 NOTE — ASSESSMENT & PLAN NOTE
Cont IV diuresis monitor I/O   Needs low salt compliance  CHF nursing education and f/u CHF clinic upon DC    8/1/22  -Continue IV diuresis for now  -Continue OMT-Toprol XL, ACEi  -Echo showed EF of 40-45%, DD  -Will consider MPI Stress test once respiratory status improved    8/2/22  -Clinically improved, BNP trending down  -Will transition to po Lasix tomorrow  -Continue Toprol XL, ACEi  -MPI stress test tmw    8/3/22  -Stable  -Continue Toprol XL, ACEi, po Lasix  -MPI stress test today

## 2022-08-03 NOTE — PLAN OF CARE
[facility] - Telemetry (Hospital)  Discharge Final Note    Primary Care Provider: Eunice Mora NP    Expected Discharge Date: 8/3/2022    Final Discharge Note (most recent)     Final Note - 08/03/22 1138        Final Note    Assessment Type Final Discharge Note     Anticipated Discharge Disposition Home or Self Care     Hospital Resources/Appts/Education Provided Post-Acute resouces added to AVS        Post-Acute Status    Post-Acute Authorization HME     HME Status Set-up Complete/Auth obtained                 Important Message from Medicare             Contact Info     Eunice Mora NP   Specialty: Family Medicine   Relationship: PCP - General    [address] [address] [address]   Phone: [phone]       Next Steps: Schedule an appointment as soon as possible for a visit in 1 week(s)    [facility]   Specialty: DME Provider    [address] [place] [address]   Phone: [phone]       Next Steps: Follow up    Instructions: home Oxygen supplier

## 2022-08-03 NOTE — PT/OT/SLP PROGRESS
Physical Therapy Treatment    Patient Name:  Crow Green   MRN:  [id number]    Recommendations:     Discharge Recommendations:  home health PT   Discharge Equipment Recommendations: none   Barriers to discharge: None    Assessment:     Crow Green is a 67 y.o. male admitted with a medical diagnosis of Acute on chronic respiratory failure with hypoxia.  He presents with the following impairments/functional limitations:  weakness, impaired endurance, impaired functional mobility, impaired cardiopulmonary response to activity.    Pt tolerated treatment well with good effort and participation. Pt demonstrates increased safety awareness during ambulation throughout the hallway while using the RW. Pt was able to self-monitor, and take standing rest breaks as needed.     Rehab Prognosis: Good; patient would benefit from acute skilled PT services to address these deficits and reach maximum level of function.    Recent Surgery: * No surgery found *      Plan:     During this hospitalization, patient to be seen 3 x/week to address the identified rehab impairments via gait training, therapeutic activities, therapeutic exercises and progress toward the following goals:    · Plan of Care Expires:  08/15/22    Subjective     Chief Complaint: none noted by pt  Pain/Comfort:  · Pain Rating 1: 0/10  · Pain Rating Post-Intervention 1: 0/10      Objective:     Communicated with RNDevon prior to session.  Patient found up in chair with telemetry, peripheral IV, oxygen upon PT entry to room.     General Precautions: Standard, fall   Orthopedic Precautions:N/A   Braces: N/A  Respiratory Status: Nasal cannula, flow 2 L/min     Functional Mobility:  · Transfers:     · Sit to Stand:  modified independence with no AD  · Gait: pt ambulates 225 ft Mod (I) with RW, O2 doffed. Pt cued to increase gait speed. Pt with good RW management, pacing, and energy conservation techniques. Standing rest break provided.  · Balance:  SPV      AM-PAC 6 CLICK MOBILITY  Turning over in bed (including adjusting bedclothes, sheets and blankets)?: 4  Sitting down on and standing up from a chair with arms (e.g., wheelchair, bedside commode, etc.): 4  Moving from lying on back to sitting on the side of the bed?: 4  Moving to and from a bed to a chair (including a wheelchair)?: 4  Need to walk in hospital room?: 4  Climbing 3-5 steps with a railing?: 2  Basic Mobility Total Score: 22       Therapeutic Activities and Exercises:  Pt educated on safety techniques and recommendations upon discharge from hospital.    SpO2 remained >90% t/o treatment session.    Patient left up in chair with call button in reach.    GOALS:   Multidisciplinary Problems     Physical Therapy Goals        Problem: Physical Therapy    Goal Priority Disciplines Outcome Goal Variances Interventions   Physical Therapy Goal     PT, PT/OT      Description: LTG'S TO BE MET IN 14 DAYS (8-15-22)  PT WILL REQUIRE [facility] FOR BED MOBILITY  PT WILL REQUIRE [facility] FOR BED<>CHAIR TF'S  PT WILL  FEET WITH RW AND FELIX                     Time Tracking:     PT Received On: 08/03/22  PT Start Time: 1347     PT Stop Time: 1400  PT Total Time (min): 13 min     Billable Minutes: Gait Training 13    Treatment Type: Treatment  PT/PTA: PTA     PTA Visit Number: 1     08/03/2022

## 2022-08-03 NOTE — NURSING
Patient received discharge prescription, patient gathered all personal belongings. Discharge paperwork reviewed and given to patient. Patient taken via wheelchair to [facility] and discharged home via personal vehicle. Patient has his home O2 tank with him and was on O2 2L NC

## 2022-08-03 NOTE — PLAN OF CARE
[facility] - Telemetry (Hospital)  Discharge Final Note    Primary Care Provider: Eunice Mora NP    Expected Discharge Date: 8/3/2022    Final Discharge Note (most recent)     Final Note - 08/03/22 1534        Final Note    Assessment Type Final Discharge Note     Anticipated Discharge Disposition Home-Health Care Sv     Hospital Resources/Appts/Education Provided Appointments scheduled and added to AVS        Post-Acute Status    Post-Acute Authorization Home Health;HME     HME Status Set-up Complete/Auth obtained     Home Health Status Set-up Complete/Auth obtained                 Important Message from Medicare  Important Message from Medicare regarding Discharge Appeal Rights: Given to patient/caregiver, Explained to patient/caregiver, Signed/date by patient/caregiver     Date IMM was signed: 08/03/22  Time IMM was signed: 1520    Contact Info     Eunice Mora NP   Specialty: Family Medicine   Relationship: PCP - General    [address] [address] [address]   Phone: [phone]       Next Steps: Schedule an appointment as soon as possible for a visit in 1 week(s)    Instructions: 8/10/2022 11 am post hospital f/u scheduled.    [facility]   Specialty: DME Provider    [address] [place] [address]   Phone: [phone]       Next Steps: Follow up    Instructions: home Oxygen supplier    Eduard Zhao MD   Specialty: Cardiology, Cardiovascular Disease   Relationship: Consulting Physician    [address] [place] [address]   Phone: [phone]       Next Steps: Schedule an appointment as soon as possible for a visit in 1 week(s)    Instructions: hospital follow up    Medicaid Waiver Services    [place] [facility] at [phone]       Next Steps: Follow up    Instructions: Please call phone number regarding home care services.        DC Dispo: home with continued [facility]    Portable O2 tank delivered to bedside, patient states [facility] called  and home concentrator has been delivered. CM instructed patient to contact [facility] with questions/concerns (contact info with patient).    PCP f/u: soonest available outside of 1 week window. Escalated to clinic staff with request for sooner appt.    CM provided patient with Medicaid Waiver service contact info and added to AVS.

## 2022-08-03 NOTE — DISCHARGE SUMMARY
[place] - Telemetry ([facility])  [facility] Medicine  Discharge Summary      Patient Name: Crow Green  MRN: [id number]  Patient Class: IP- Inpatient  Admission Date: 7/30/2022  Hospital Length of Stay: 1 days  Discharge Date and Time:  08/03/2022 2:59 PM  Attending Physician: Justin Alba MD   Discharging Provider: Justin Alba MD  Primary Care Provider: Eunice Mora NP      HPI:   Crow Green is a 67 y.o. male patient with a PMHx of asthma, atrial fibrillation with RVR, CHF, chronic obstructive pulmonary disease, DM, elevated PSA, HTN, MI who presents to the Emergency Department for evaluation of mid-sternal chest pain which onset gradually yesterday. Pt also c/o a cough and feeling weak. Symptoms are constant and moderate in severity. No mitigating or exacerbating factors reported. Associated sxs include weakness, cough. Patient denies any n/v/d, fever, chills, and all other sxs at this time.     In the ED O2 sats 86%, RR 30, CBC unremarkable, , Troponin 0.155, CXR showed Left basilar opacity concerning for atelectasis or infection.     Hospital medicine was called and the pt was placed in observation with telemetry monitoring. Pt is a full code - surrogate decision maker is his sister, Gretchen Green.          * No surgery found *      Hospital Course:   Mr Green is a 67 year old male who presented to [facility] for evaluation of chest pain. Associated symptoms include cough and shortness of breath. O 2 sats 86 % in the ED, he was placed on supplemental O 2.  , he has been started on Lasix IV. Patient was also started on IV antibiotic for concerning for athelectasis vs infection on chest X ray. As of 8/1/2022, patient continue to experience shortness of breath, cough and chest congestion. Related to Acute CHF vs COPD exacerbation. Cardiology following. As of 8/2/2022, vital signs and labs stable. Patient reports symptoms starting to improving. Cardiology following, plan  Stress Test on tomorrow.     8/3  Patient underwent stress test. Denies chest pain. Qualified for home oxygen. Hyperglycemia noted as  patient received systemic steroids for dyspnea, which responded appropriately to insulin. Concerns for underlying copd. Controlled diabetes mellitus. Ambulated with physical/occupational therapy and recommended home with physical/occupational therapy.    Patient seen and evaluated by me. Patient was determined to be suitable for d/c. Patient deemed stable for discharge to home with homehealth with physical/occupational therapy and nurse practitioner to visit. Stress test which showed scar. No changes to medication(s) necessary based on results.         Goals of Care Treatment Preferences:  Code Status: Full Code      Consults:   Consults (From admission, onward)        Status Ordering Provider     Inpatient consult to Social Work/Case Management  Once        Provider:  (Not yet assigned)    Completed KOMAL ELLIOTT     Inpatient consult to Social Work/Case Management  Once        Provider:  (Not yet assigned)    Completed BETTYE DUCKWORTH     Inpatient consult to Registered Dietitian/Nutritionist  Once        Provider:  (Not yet assigned)    Completed BETTYE DUCKWORTH     Inpatient consult to Cardiology  Once        Provider:  Franklin Drake MD    Completed MAIA KEITA          No new Assessment & Plan notes have been filed under this hospital service since the last note was generated.  Service: Hospital Medicine    Final Active Diagnoses:    Diagnosis Date Noted POA    PRINCIPAL PROBLEM:  Acute on chronic respiratory failure with hypoxia [J96.21] 07/31/2022 Yes    Pneumonia [J18.9] 07/31/2022 Yes    Chronic obstructive pulmonary disease [J44.9] 08/05/2020 Yes    Elevated troponin [R77.8] 06/26/2020 Yes    PAF (paroxysmal atrial fibrillation) [I48.0] 03/17/2017 Yes     Chronic    Other chest pain [R07.89] 03/15/2017 Yes    SANDRITA on CPAP [G47.33, Z99.89] 01/12/2017 Not  "Applicable    Acute on chronic combined systolic and diastolic heart failure [I50.43] 12/05/2016 Yes    Essential hypertension [I10] 02/27/2015 Yes      Problems Resolved During this Admission:       Discharged Condition: stable    Disposition:     Follow Up:   Follow-up Information     Eunice Mora NP. Schedule an appointment as soon as possible for a visit in 1 week.    Specialty: Family Medicine  Why: 8/10/2022 11 am post hospital f/u scheduled.  Contact information:  [address] [address] [address] [phone]             [facility].    Specialty: DME Provider  Why: home Oxygen supplier  Contact information:  [address]  [address]  [place] [address]  [phone]             Eduard Zhao MD. Schedule an appointment as soon as possible for a visit in 1 week(s).    Specialties: Cardiology, Cardiovascular Disease  Why: hospital follow up  Contact information:  [address]  [place] [id number] [phone]                       Patient Instructions:      OXYGEN FOR HOME USE     Order Specific Question Answer Comments   Liter Flow 1    Duration Continuous    Qualifying Test Performed at: Activity    Oxygen saturation at rest 91    Oxygen saturation with activity 87    Oxygen saturation with activity on oxygen 94    Portable mode: continuous    Route nasal cannula    Device: home concentrator with portable tanks    Length of need (in months): 99 mos    Patient condition with qualifying saturation CHF    Height: 5' 5" (1.651 m)    Weight: 90.1 kg (198 lb 10.2 oz)    Alternative treatment measures have been tried or considered and deemed clinically ineffective. Yes      Ambulatory referral/consult to [facility]   Standing Status: Future   Referral Priority: Routine Referral Type: Consultation   Referral Reason: Specialty Services Required   Number of Visits Requested: 1     Ambulatory referral/consult to Home Health   Standing Status: Future "   Referral Priority: Routine Referral Type: Home Health   Referral Reason: Specialty Services Required   Requested Specialty: Home Health Services   Number of Visits Requested: 1     Diet Cardiac     Diet diabetic     Activity as tolerated       Significant Diagnostic Studies: Labs:   CMP   Recent Labs   Lab 08/02/22  0527 08/03/22  0313     139 140   K 4.5  4.5 5.2*     104 101   CO2 25  25 30*   *  178* 238*   BUN 23  23 34*   CREATININE 1.3  1.3 1.4   CALCIUM 8.5*  8.5* 8.8   PROT 6.5  --    ALBUMIN 2.6*  --    BILITOT 0.3  --    ALKPHOS 67  --    AST 15  --    ALT 14  --    ANIONGAP 10  10 9   , CBC   Recent Labs   Lab 08/01/22  1608 08/02/22  0527   WBC 7.02 6.90   HGB 9.7* 10.7*   HCT 31.4* 35.7*    107*   , INR   Lab Results   Component Value Date    INR 1.0 08/03/2021    INR 1.0 05/24/2021    INR 1.0 06/26/2020   , Lipid Panel   Lab Results   Component Value Date    CHOL 152 06/10/2022    HDL 43 06/10/2022    LDLCALC 88.8 06/10/2022    TRIG 101 06/10/2022    CHOLHDL 28.3 06/10/2022   , Troponin   Recent Labs   Lab 07/30/22  2232 07/31/22  0634 07/31/22  0925   TROPONINI 0.155* 0.151* 0.138*   , A1C:   Recent Labs   Lab 03/03/22  0503 06/10/22  0926 07/31/22  1727   HGBA1C 6.1* 6.4* 6.0*    and All labs within the past 24 hours have been reviewed  Microbiology:   Blood Culture   Lab Results   Component Value Date    LABBLOO No Growth to date 07/31/2022    LABBLOO No Growth to date 07/31/2022    LABBLOO No Growth to date 07/31/2022    LABBLOO No Growth to date 07/31/2022    LABBLOO No Growth to date 07/31/2022    LABBLOO No Growth to date 07/31/2022    LABBLOO No Growth to date 07/31/2022    LABBLOO No Growth to date 07/31/2022    and Sputum Culture   Lab Results   Component Value Date    GSRESP <10 epithelial cells per low power field. 07/31/2022    GSRESP Few WBC's 07/31/2022    GSRESP Many Gram positive cocci 07/31/2022    GSRESP Many Gram negative rods  07/31/2022     GSRESP Many budding yeast  07/31/2022    GSRESP Few Gram positive rods 07/31/2022    RESPIRATORYC No Pseudomonas isolated. 07/31/2022    RESPIRATORYC (A) 07/31/2022     STAPHYLOCOCCUS AUREUS  Few  Susceptibility pending  Normal respiratory hermelindo also present       Radiology: X-Ray: CXR: portable  Cardiac Graphics: Echocardiogram:   Transthoracic echo (TTE) complete (Cupid Only):   Results for orders placed or performed during the hospital encounter of 07/30/22   Echo   Result Value Ref Range    BSA 2.03 m2    TDI SEPTAL 0.04 m/s    LV LATERAL E/E' RATIO 26.00 m/s    LV SEPTAL E/E' RATIO 32.50 m/s    LA WIDTH 3.60 cm    IVC diameter 2.26 cm    Left Ventricular Outflow Tract Mean Velocity 0.73373630779077 cm/s    Left Ventricular Outflow Tract Mean Gradient 2.00 mmHg    TDI LATERAL 0.05 m/s    LVIDd 4.08 3.5 - 6.0 cm    IVS 2.06 (A) 0.6 - 1.1 cm    Posterior Wall 1.93 (A) 0.6 - 1.1 cm    Ao root annulus 2.90 cm    LVIDs 3.20 2.1 - 4.0 cm    FS 22 28 - 44 %    LA volume 80.06 cm3    STJ 2.86 cm    Ascending aorta 3.13 cm    LV mass 381.36 g    LA size 4.71 cm    Left Ventricle Relative Wall Thickness 0.95 cm    AV mean gradient 9 mmHg    AV valve area 1.66 cm2    AV Velocity Ratio 0.46     AV index (prosthetic) 0.45     MV valve area p 1/2 method 3.83 cm2    E/A ratio 2.89     Mean e' 0.05 m/s    E wave deceleration time 198.519335198373323 msec    IVRT 59.040189040100922 msec    LVOT diameter 2.17 cm    LVOT area 3.7 cm2    LVOT peak mathew 0.91 m/s    LVOT peak VTI 18.60 cm    Ao peak mathew 2.00 m/s    Ao VTI 41.3 cm    RVOT peak mathew 0.52 m/s    RVOT peak VTI 9.6 cm    Mr max mathew 4.25 m/s    LVOT stroke volume 68.75 cm3    AV peak gradient 16 mmHg    PV mean gradient 0.64 mmHg    E/E' ratio 28.89 m/s    MV Peak E Mathew 1.30 m/s    TR Max Mathew 2.76 m/s    MV stenosis pressure 1/2 time 57.537044162107078 ms    MV Peak A Mathew 0.45 m/s    LV Systolic Volume 40.99 mL    LV Systolic Volume Index 20.8 mL/m2    LV Diastolic Volume  73.19 mL    LV Diastolic Volume Index 37.15 mL/m2    LA Volume Index 40.6 mL/m2    LV Mass Index 194 g/m2    RA Major Axis 4.41 cm    Left Atrium Minor Axis 5.57 cm    Left Atrium Major Axis 5.54 cm    Triscuspid Valve Regurgitation Peak Gradient 30 mmHg    Right Atrial Pressure (from IVC) 15 mmHg    EF 40 %    TV rest pulmonary artery pressure 45 mmHg    Narrative    · The left ventricle is normal in size with severe concentric hypertrophy   and mildly decreased systolic function.  · Mild left atrial enlargement.  · The estimated ejection fraction is 40 - 45%.  · Grade III left ventricular diastolic dysfunction.  · Normal right ventricular size with mildly reduced right ventricular   systolic function.  · Mild tricuspid regurgitation.  · Mild pulmonic regurgitation.  · Elevated central venous pressure (15 mmHg).  · The estimated PA systolic pressure is 45 mmHg.  · There is pulmonary hypertension.       and Stress Test: nuclear stress test pending official read    Pending Diagnostic Studies:     None         Medications:  Reconciled Home Medications:      Medication List      START taking these medications    doxycycline 100 MG tablet  Commonly known as: VIBRA-TABS  Take 1 tablet (100 mg total) by mouth every 12 (twelve) hours. for 2 days        CHANGE how you take these medications    insulin degludec 200 unit/mL (3 mL) insulin pen  Commonly known as: TRESIBA FLEXTOUCH U-200  Inject 40 Units into the skin once daily.  What changed: how much to take        CONTINUE taking these medications    acetaminophen 500 MG tablet  Commonly known as: TYLENOL  Take 2 tablets (1,000 mg total) by mouth every 8 (eight) hours as needed for Pain.     albuterol 2.5 mg /3 mL (0.083 %) nebulizer solution  Commonly known as: PROVENTIL  Take 3 mLs (2.5 mg total) by nebulization every 4 to 6 hours as needed for Wheezing or Shortness of Breath.     allopurinoL 100 MG tablet  Commonly known as: ZYLOPRIM  Take 1 tablet (100 mg total) by  mouth once daily.     aspirin 81 MG EC tablet  Commonly known as: ECOTRIN  Take 1 tablet (81 mg total) by mouth once daily.     atorvastatin 40 MG tablet  Commonly known as: LIPITOR  TAKE 1 TABLET BY MOUTH EVERY EVENING     azaTHIOprine 50 mg Tab  Commonly known as: IMURAN  Take 1 tablet (50 mg total) by mouth once daily.     baclofen 10 MG tablet  Commonly known as: LIORESAL  Take 1 tablet (10 mg total) by mouth once daily.     blood sugar diagnostic Strp  1 each by Misc.(Non-Drug; Combo Route) route 4 (four) times daily.     blood-glucose meter kit  Commonly known as: TRUE METRIX GLUCOSE METER  Use as instructed to test blood glucose meter daily.     ELIQUIS 5 mg Tab  Generic drug: apixaban  Take 1 tablet (5 mg total) by mouth 2 (two) times daily.     fluticasone-salmeterol 250-50 mcg/dose 250-50 mcg/dose diskus inhaler  Commonly known as: ADVAIR DISKUS  Inhale 1 puff into the lungs 2 (two) times daily. Controller     furosemide 40 MG tablet  Commonly known as: LASIX  Take 1 tablet (40 mg total) by mouth once daily.     gabapentin 800 MG tablet  Commonly known as: NEURONTIN  Take 1 tablet (800 mg total) by mouth 3 (three) times daily.     HYDROcodone-acetaminophen 7.5-325 mg per tablet  Commonly known as: NORCO  SMARTSI Tablet(s) By Mouth Every 12 Hours PRN     insulin aspart U-100 100 unit/mL (3 mL) Inpn pen  Commonly known as: NovoLOG Flexpen U-100 Insulin  INJECT 10 UNITS SUBCUTANEOUSLY THREE TIMES DAILY WITH MEALS     ipratropium 0.02 % nebulizer solution  Commonly known as: ATROVENT  INHALE THE CONTENTS OF 1 VIAL VIA NEBULIZER FOUR TIMES DAILY     isosorbide mononitrate 60 MG 24 hr tablet  Commonly known as: IMDUR  Take 1 tablet (60 mg total) by mouth 2 (two) times a day.     JARDIANCE 25 mg tablet  Generic drug: empagliflozin  TAKE 1 TABLET(25 MG) BY MOUTH EVERY DAY     latanoprost 0.005 % ophthalmic solution  INSTILL 1 DROP INTO BOTH EYES ONE TIME DAILY     lisinopriL 10 MG tablet  Take 1 tablet (10 mg  total) by mouth once daily.     metoprolol succinate 50 MG 24 hr tablet  Commonly known as: TOPROL-XL  Take 1 tablet (50 mg total) by mouth every evening.     MICRO THIN LANCETS 33 gauge Misc  Generic drug: lancets     OZEMPIC 1 mg/dose (2 mg/1.5 mL) Pnij  Generic drug: semaglutide  Inject 1 mg into the skin every 7 days.     pantoprazole 40 MG tablet  Commonly known as: PROTONIX  Take 1 tablet (40 mg total) by mouth once daily.     ranolazine 1,000 mg Tb12  Commonly known as: RANEXA  Take 1 tablet (1,000 mg total) by mouth 2 (two) times daily.     sertraline 100 MG tablet  Commonly known as: ZOLOFT  Take 1 tablet (100 mg total) by mouth every evening.     SPIRIVA WITH HANDIHALER 18 mcg inhalation capsule  Generic drug: tiotropium  INHALE THE CONTENTS OF 1 CAPSULE ONE TIME DAILY (CONTROLLER)     tacrolimus 0.1 % ointment  Commonly known as: PROTOPIC  Apply topically 2 (two) times daily.     tamsulosin 0.4 mg Cap  Commonly known as: FLOMAX  Take 1 capsule (0.4 mg total) by mouth once daily.     traMADoL 50 mg tablet  Commonly known as: ULTRAM  Take 1 tablet (50 mg total) by mouth every 8 (eight) hours as needed for Pain.     traZODone 100 MG tablet  Commonly known as: DESYREL  Take 2 tablets (200 mg total) by mouth every evening.     triamcinolone acetonide 0.1% 0.1 % ointment  Commonly known as: KENALOG  Apply topically 2 (two) times daily. Use on legs and back     VIOS AEROSOL DELIVERY SYSTEM Shefali  Generic drug: nebulizer and compressor  USE AS DIRESTED        STOP taking these medications    amLODIPine 2.5 MG tablet  Commonly known as: NORVASC     diclofenac sodium 1 % Gel  Commonly known as: VOLTAREN     nitroGLYCERIN 0.4 MG SL tablet  Commonly known as: NITROSTAT            Indwelling Lines/Drains at time of discharge:   Lines/Drains/Airways     None                 Time spent on the discharge of patient: 39 minutes         Justin Alba MD  Department of Hospital Medicine  '[place] - Telemetry ([facility])

## 2022-08-03 NOTE — SUBJECTIVE & OBJECTIVE
Review of Systems   Constitutional: Negative.   HENT: Negative.     Eyes: Negative.    Cardiovascular:  Positive for dyspnea on exertion (improved).   Respiratory: Negative.     Endocrine: Negative.    Hematologic/Lymphatic: Negative.    Skin: Negative.    Musculoskeletal: Negative.    Gastrointestinal: Negative.    Genitourinary: Negative.    Neurological: Negative.    Psychiatric/Behavioral: Negative.     Allergic/Immunologic: Negative.    Objective:     Vital Signs (Most Recent):  Temp: 97.6 °F (36.4 °C) (08/03/22 0758)  Pulse: 71 (08/03/22 1029)  Resp: 18 (08/03/22 0758)  BP: 131/74 (08/03/22 1029)  SpO2: 98 % (08/03/22 1029) Vital Signs (24h Range):  Temp:  [97.6 °F (36.4 °C)-98.2 °F (36.8 °C)] 97.6 °F (36.4 °C)  Pulse:  [66-87] 71  Resp:  [12-20] 18  SpO2:  [94 %-98 %] 98 %  BP: (104-185)/(54-83) 131/74     Weight: 89.8 kg (198 lb)  Body mass index is 32.95 kg/m².     SpO2: 98 %  O2 Device (Oxygen Therapy): nasal cannula      Intake/Output Summary (Last 24 hours) at 8/3/2022 1318  Last data filed at 8/3/2022 1100  Gross per 24 hour   Intake 120 ml   Output 2250 ml   Net -2130 ml       Lines/Drains/Airways       Peripheral Intravenous Line  Duration                  Peripheral IV - Single Lumen 20 G Left Forearm -- days                    Physical Exam  Vitals and nursing note reviewed.   Constitutional:       General: He is not in acute distress.     Appearance: Normal appearance. He is well-developed. He is not diaphoretic.   HENT:      Head: Normocephalic and atraumatic.   Eyes:      General:         Right eye: No discharge.         Left eye: No discharge.      Pupils: Pupils are equal, round, and reactive to light.   Neck:      Thyroid: No thyromegaly.      Vascular: No JVD.      Trachea: No tracheal deviation.   Cardiovascular:      Rate and Rhythm: Normal rate and regular rhythm.      Heart sounds: Normal heart sounds, S1 normal and S2 normal. No murmur heard.  Pulmonary:      Effort: Pulmonary effort  is normal. No respiratory distress.      Breath sounds: Normal breath sounds. No wheezing or rales.   Abdominal:      General: There is no distension.      Palpations: Abdomen is soft.      Tenderness: There is no rebound.   Musculoskeletal:      Cervical back: Neck supple.      Right lower leg: No edema.      Left lower leg: No edema.   Skin:     General: Skin is warm and dry.      Findings: No erythema.   Neurological:      General: No focal deficit present.      Mental Status: He is alert and oriented to person, place, and time.   Psychiatric:         Mood and Affect: Mood normal.         Behavior: Behavior normal.         Thought Content: Thought content normal.       Significant Labs: CMP   Recent Labs   Lab 08/02/22  0527 08/03/22  0313     139 140   K 4.5  4.5 5.2*     104 101   CO2 25  25 30*   *  178* 238*   BUN 23  23 34*   CREATININE 1.3  1.3 1.4   CALCIUM 8.5*  8.5* 8.8   PROT 6.5  --    ALBUMIN 2.6*  --    BILITOT 0.3  --    ALKPHOS 67  --    AST 15  --    ALT 14  --    ANIONGAP 10  10 9   , CBC   Recent Labs   Lab 08/01/22  1608 08/02/22  0527   WBC 7.02 6.90   HGB 9.7* 10.7*   HCT 31.4* 35.7*    107*   , Troponin No results for input(s): TROPONINI in the last 48 hours., and All pertinent lab results from the last 24 hours have been reviewed.    Significant Imaging: Echocardiogram: Transthoracic echo (TTE) complete (Cupid Only):   Results for orders placed or performed during the hospital encounter of 07/30/22   Echo   Result Value Ref Range    BSA 2.03 m2    TDI SEPTAL 0.04 m/s    LV LATERAL E/E' RATIO 26.00 m/s    LV SEPTAL E/E' RATIO 32.50 m/s    LA WIDTH 3.60 cm    IVC diameter 2.26 cm    Left Ventricular Outflow Tract Mean Velocity 0.72626859978239 cm/s    Left Ventricular Outflow Tract Mean Gradient 2.00 mmHg    TDI LATERAL 0.05 m/s    LVIDd 4.08 3.5 - 6.0 cm    IVS 2.06 (A) 0.6 - 1.1 cm    Posterior Wall 1.93 (A) 0.6 - 1.1 cm    Ao root annulus 2.90 cm    LVIDs  3.20 2.1 - 4.0 cm    FS 22 28 - 44 %    LA volume 80.06 cm3    STJ 2.86 cm    Ascending aorta 3.13 cm    LV mass 381.36 g    LA size 4.71 cm    Left Ventricle Relative Wall Thickness 0.95 cm    AV mean gradient 9 mmHg    AV valve area 1.66 cm2    AV Velocity Ratio 0.46     AV index (prosthetic) 0.45     MV valve area p 1/2 method 3.83 cm2    E/A ratio 2.89     Mean e' 0.05 m/s    E wave deceleration time 198.306024978160383 msec    IVRT 59.508715162627505 msec    LVOT diameter 2.17 cm    LVOT area 3.7 cm2    LVOT peak mathew 0.91 m/s    LVOT peak VTI 18.60 cm    Ao peak mathew 2.00 m/s    Ao VTI 41.3 cm    RVOT peak mathew 0.52 m/s    RVOT peak VTI 9.6 cm    Mr max mathew 4.25 m/s    LVOT stroke volume 68.75 cm3    AV peak gradient 16 mmHg    PV mean gradient 0.64 mmHg    E/E' ratio 28.89 m/s    MV Peak E Mathew 1.30 m/s    TR Max Mathew 2.76 m/s    MV stenosis pressure 1/2 time 57.175563620084594 ms    MV Peak A Mathew 0.45 m/s    LV Systolic Volume 40.99 mL    LV Systolic Volume Index 20.8 mL/m2    LV Diastolic Volume 73.19 mL    LV Diastolic Volume Index 37.15 mL/m2    LA Volume Index 40.6 mL/m2    LV Mass Index 194 g/m2    RA Major Axis 4.41 cm    Left Atrium Minor Axis 5.57 cm    Left Atrium Major Axis 5.54 cm    Triscuspid Valve Regurgitation Peak Gradient 30 mmHg    Right Atrial Pressure (from IVC) 15 mmHg    EF 40 %    TV rest pulmonary artery pressure 45 mmHg    Narrative    · The left ventricle is normal in size with severe concentric hypertrophy   and mildly decreased systolic function.  · Mild left atrial enlargement.  · The estimated ejection fraction is 40 - 45%.  · Grade III left ventricular diastolic dysfunction.  · Normal right ventricular size with mildly reduced right ventricular   systolic function.  · Mild tricuspid regurgitation.  · Mild pulmonic regurgitation.  · Elevated central venous pressure (15 mmHg).  · The estimated PA systolic pressure is 45 mmHg.  · There is pulmonary hypertension.      , EKG: Reviewed,  and X-Ray: CXR: X-Ray Chest 1 View (CXR): No results found for this visit on 07/30/22. and X-Ray Chest PA and Lateral (CXR): No results found for this visit on 07/30/22.

## 2022-08-03 NOTE — PT/OT/SLP PROGRESS
Occupational Therapy   Treatment and Discharge    Name: Crow Green  MRN: [id number]  Admitting Diagnosis:  Acute on chronic respiratory failure with hypoxia       Recommendations:     Discharge Recommendations: home health OT (to ensure safe transition home)  Discharge Equipment Recommendations:  walker, rolling  Barriers to discharge:  None    Assessment:     Crow Green is a 67 y.o. male with a medical diagnosis of Acute on chronic respiratory failure with hypoxia.      Plan:     · Plan of Care Reviewed with: patient   · Patient has met acute OT goals. D/C OT.    Subjective     Pain/Comfort:  · Pain Rating 1: 3/10  · Location - Side 1: Left  · Location 1: shoulder  · Pain Addressed 1:  (activity pacing)    Objective:     Communicated with: Nurse, Devon, prior to session.  Patient found supine with telemetry, peripheral IV upon OT entry to room.    General Precautions: Standard, fall   Orthopedic Precautions:N/A   Braces: N/A  Respiratory Status: Room air. Has 1L of O2 NC on and in reach to use PRN    Bed Mobility:    · Patient completed Supine to Sit with modified independence     Functional Mobility/Transfers:  · Patient completed Sit <> Stand Transfer with modified independence  with  rolling walker   · Patient completed Bed <> Chair Transfer using Step Transfer technique with modified independence with rolling walker  · Functional Mobility: Patient completed x220ft functional mobility mod I with RW to increase dynamic standing balance and activity tolerance needed for ADL completion.    Activities of Daily Living:  · Lower Body Dressing: modified independence donned pants while seated EOB   · Patient reports completing sponge bath mod I in bathroom prior to OT arrival.    [facility] 6 Click ADL: 24    Treatment & Education:  Patient tolerated intervention well this date. No dyspnea with exertion elicited. Patient provided with education re: energy conservation and activity pacing throughout ADLs and  mobility to decrease risk of over exertion and fall risk. Patient was encouraged to complete B UE AROM therex to tolerance, with focus on L shdr, to decrease pain, increase activity tolerance and strength. Patient stated understanding and in agreement with POC. Patient has returned to functional mod I baseline. Continued skilled OT intervention not warranted in acute.    Patient left up in chair with all lines intact and call button in reachEducation:      GOALS:   Multidisciplinary Problems     Occupational Therapy Goals     Not on file          Multidisciplinary Problems (Resolved)        Problem: Occupational Therapy    Goal Priority Disciplines Outcome Interventions   Occupational Therapy Goal   (Resolved)     OT, PT/OT Met    Description: Goals to be met by: 8/15/22     Patient will increase functional independence with ADLs by performing:    Toileting from toilet with Supervision for hygiene and clothing management.   Toilet transfer to toilet with Supervision.  Increased functional strength in B UE grossly by 1/2 MM grade.                     Time Tracking:     OT Date of Treatment: 08/03/22  OT Start Time: 1250  OT Stop Time: 1315  OT Total Time (min): 25 min    Billable Minutes:Therapeutic Activity 25    8/3/2022

## 2022-08-03 NOTE — PROGRESS NOTES
[facility]  Cardiology  Progress Note    Patient Name: Crow Green  MRN: [id number]  Admission Date: 7/30/2022  Hospital Length of Stay: 1 days  Code Status: Full Code   Attending Physician: Justin Alba MD   Primary Care Physician: Eunice Mora NP  Expected Discharge Date: 8/3/2022  Principal Problem:Acute on chronic respiratory failure with hypoxia    Subjective:   HPI:  Crow Green is a 67 y.o. male patient with a PMHx of asthma, atrial fibrillation with RVR, CHF, chronic obstructive pulmonary disease, DM, elevated PSA, HTN, MI who presents to the Emergency Department for evaluation of mid-sternal chest pain which onset gradually yesterday. Pt also c/o a cough and feeling weak. Symptoms are constant and moderate in severity. No mitigating or exacerbating factors reported. Associated sxs include weakness, cough. Patient denies any n/v/d, fever, chills, and all other sxs at this time.   In the ED O2 sats 86%, RR 30, CBC unremarkable, , Troponin 0.155, CXR showed Left basilar opacity concerning for atelectasis or infection.        Cardiology consulted for CHF exac with elevated trop. Trop trending down. ECHO today with stable LV function 40-45 with mild dec RV function, inc CVP, PASP 45 mmHg. Diuresing well now with IV lasix; pt admits to dietary non compliance with adding salt. Has cough now with SOB/mild CP.         Results for orders placed during the hospital encounter of 07/30/22     Echo     Interpretation Summary  · The left ventricle is normal in size with severe concentric hypertrophy and mildly decreased systolic function.  · Mild left atrial enlargement.  · The estimated ejection fraction is 40 - 45%.  · Grade III left ventricular diastolic dysfunction.  · Normal right ventricular size with mildly reduced right ventricular systolic function.  · Mild tricuspid regurgitation.  · Mild pulmonic regurgitation.  · Elevated central venous pressure (15 mmHg).  ·  The estimated PA systolic pressure is 45 mmHg.  · There is pulmonary hypertension.       Hospital Course:   8/1/22-Patient seen and examined today, sitting up in bedside chair. Feels poorly. Still complains of SOB and non-productive cough. No chest pain but does endorse rib soreness from coughing spells. Diuresed well overnight. Labs reviewed. Creatinine 1.4. Discussed ischemic evaluation with MPI stress test once respiratory status improved.     8/2/22-Patient seen and examined today, lying in bed. Feels ok. Still having coughing spells. SOB slowly improving. BNP trending down, creatinine stable. MPI stress test tmw.     8/3/22-Patient seen and examined today. SOB improved. Creatinine stable, K 5.2. MPI stress test today.          Review of Systems   Constitutional: Negative.   HENT: Negative.     Eyes: Negative.    Cardiovascular:  Positive for dyspnea on exertion (improved).   Respiratory: Negative.     Endocrine: Negative.    Hematologic/Lymphatic: Negative.    Skin: Negative.    Musculoskeletal: Negative.    Gastrointestinal: Negative.    Genitourinary: Negative.    Neurological: Negative.    Psychiatric/Behavioral: Negative.     Allergic/Immunologic: Negative.    Objective:     Vital Signs (Most Recent):  Temp: 97.6 °F (36.4 °C) (08/03/22 0758)  Pulse: 71 (08/03/22 1029)  Resp: 18 (08/03/22 0758)  BP: 131/74 (08/03/22 1029)  SpO2: 98 % (08/03/22 1029) Vital Signs (24h Range):  Temp:  [97.6 °F (36.4 °C)-98.2 °F (36.8 °C)] 97.6 °F (36.4 °C)  Pulse:  [66-87] 71  Resp:  [12-20] 18  SpO2:  [94 %-98 %] 98 %  BP: (104-185)/(54-83) 131/74     Weight: 89.8 kg (198 lb)  Body mass index is 32.95 kg/m².     SpO2: 98 %  O2 Device (Oxygen Therapy): nasal cannula      Intake/Output Summary (Last 24 hours) at 8/3/2022 1318  Last data filed at 8/3/2022 1100  Gross per 24 hour   Intake 120 ml   Output 2250 ml   Net -2130 ml       Lines/Drains/Airways       Peripheral Intravenous Line  Duration                  Peripheral IV -  Single Lumen 20 G Left Forearm -- days                    Physical Exam  Vitals and nursing note reviewed.   Constitutional:       General: He is not in acute distress.     Appearance: Normal appearance. He is well-developed. He is not diaphoretic.   HENT:      Head: Normocephalic and atraumatic.   Eyes:      General:         Right eye: No discharge.         Left eye: No discharge.      Pupils: Pupils are equal, round, and reactive to light.   Neck:      Thyroid: No thyromegaly.      Vascular: No JVD.      Trachea: No tracheal deviation.   Cardiovascular:      Rate and Rhythm: Normal rate and regular rhythm.      Heart sounds: Normal heart sounds, S1 normal and S2 normal. No murmur heard.  Pulmonary:      Effort: Pulmonary effort is normal. No respiratory distress.      Breath sounds: Normal breath sounds. No wheezing or rales.   Abdominal:      General: There is no distension.      Palpations: Abdomen is soft.      Tenderness: There is no rebound.   Musculoskeletal:      Cervical back: Neck supple.      Right lower leg: No edema.      Left lower leg: No edema.   Skin:     General: Skin is warm and dry.      Findings: No erythema.   Neurological:      General: No focal deficit present.      Mental Status: He is alert and oriented to person, place, and time.   Psychiatric:         Mood and Affect: Mood normal.         Behavior: Behavior normal.         Thought Content: Thought content normal.       Significant Labs: CMP   Recent Labs   Lab 08/02/22  0527 08/03/22  0313     139 140   K 4.5  4.5 5.2*     104 101   CO2 25  25 30*   *  178* 238*   BUN 23  23 34*   CREATININE 1.3  1.3 1.4   CALCIUM 8.5*  8.5* 8.8   PROT 6.5  --    ALBUMIN 2.6*  --    BILITOT 0.3  --    ALKPHOS 67  --    AST 15  --    ALT 14  --    ANIONGAP 10  10 9   , CBC   Recent Labs   Lab 08/01/22  1608 08/02/22  0527   WBC 7.02 6.90   HGB 9.7* 10.7*   HCT 31.4* 35.7*    107*   , Troponin No results for input(s):  TROPONINI in the last 48 hours., and All pertinent lab results from the last 24 hours have been reviewed.    Significant Imaging: Echocardiogram: Transthoracic echo (TTE) complete (Cupid Only):   Results for orders placed or performed during the hospital encounter of 07/30/22   Echo   Result Value Ref Range    BSA 2.03 m2    TDI SEPTAL 0.04 m/s    LV LATERAL E/E' RATIO 26.00 m/s    LV SEPTAL E/E' RATIO 32.50 m/s    LA WIDTH 3.60 cm    IVC diameter 2.26 cm    Left Ventricular Outflow Tract Mean Velocity 0.55308832059854 cm/s    Left Ventricular Outflow Tract Mean Gradient 2.00 mmHg    TDI LATERAL 0.05 m/s    LVIDd 4.08 3.5 - 6.0 cm    IVS 2.06 (A) 0.6 - 1.1 cm    Posterior Wall 1.93 (A) 0.6 - 1.1 cm    Ao root annulus 2.90 cm    LVIDs 3.20 2.1 - 4.0 cm    FS 22 28 - 44 %    LA volume 80.06 cm3    STJ 2.86 cm    Ascending aorta 3.13 cm    LV mass 381.36 g    LA size 4.71 cm    Left Ventricle Relative Wall Thickness 0.95 cm    AV mean gradient 9 mmHg    AV valve area 1.66 cm2    AV Velocity Ratio 0.46     AV index (prosthetic) 0.45     MV valve area p 1/2 method 3.83 cm2    E/A ratio 2.89     Mean e' 0.05 m/s    E wave deceleration time 198.428269384223984 msec    IVRT 59.045777056521564 msec    LVOT diameter 2.17 cm    LVOT area 3.7 cm2    LVOT peak mathew 0.91 m/s    LVOT peak VTI 18.60 cm    Ao peak mathew 2.00 m/s    Ao VTI 41.3 cm    RVOT peak mathew 0.52 m/s    RVOT peak VTI 9.6 cm    Mr max mathew 4.25 m/s    LVOT stroke volume 68.75 cm3    AV peak gradient 16 mmHg    PV mean gradient 0.64 mmHg    E/E' ratio 28.89 m/s    MV Peak E Mathew 1.30 m/s    TR Max Mathew 2.76 m/s    MV stenosis pressure 1/2 time 57.447630975971800 ms    MV Peak A Mathew 0.45 m/s    LV Systolic Volume 40.99 mL    LV Systolic Volume Index 20.8 mL/m2    LV Diastolic Volume 73.19 mL    LV Diastolic Volume Index 37.15 mL/m2    LA Volume Index 40.6 mL/m2    LV Mass Index 194 g/m2    RA Major Axis 4.41 cm    Left Atrium Minor Axis 5.57 cm    Left Atrium Major Axis  5.54 cm    Triscuspid Valve Regurgitation Peak Gradient 30 mmHg    Right Atrial Pressure (from IVC) 15 mmHg    EF 40 %    TV rest pulmonary artery pressure 45 mmHg    Narrative    · The left ventricle is normal in size with severe concentric hypertrophy   and mildly decreased systolic function.  · Mild left atrial enlargement.  · The estimated ejection fraction is 40 - 45%.  · Grade III left ventricular diastolic dysfunction.  · Normal right ventricular size with mildly reduced right ventricular   systolic function.  · Mild tricuspid regurgitation.  · Mild pulmonic regurgitation.  · Elevated central venous pressure (15 mmHg).  · The estimated PA systolic pressure is 45 mmHg.  · There is pulmonary hypertension.      , EKG: Reviewed, and X-Ray: CXR: X-Ray Chest 1 View (CXR): No results found for this visit on 07/30/22. and X-Ray Chest PA and Lateral (CXR): No results found for this visit on 07/30/22.    Assessment and Plan:   Patient who presents with SOB-multifactorial in setting of CHF/COPD and elevated troponin. Volume status much improved. Continue OMT. MPI Stress test today.    Pneumonia  -Cont tx per primary team     Chronic obstructive pulmonary disease  Cont tx per primary team     Elevated troponin  Sec to demand ischemia; h/o non obs CAD on prior cath  Cont asa, statin, bb, Ranexa  Ischemic workup once diuresed, outpt if stable without CP/SOB    8/1/22  -Continue ASA, statin, BB, Ranexa  -Ischemic evaluation once respiratory status improved    8/2/22  -Continue ASA, statin, BB, Ranexa  -MPI stress test tmw    8/3/22  -MPI stress test     PAF (paroxysmal atrial fibrillation)  -Cont Eliquis and BB  -In NSR    Other chest pain  Sec to CHF  Cont tx per CHF/elevated trop    SANDRITA on CPAP  Cont CPAP    Acute on chronic combined systolic and diastolic heart failure  Cont IV diuresis monitor I/O   Needs low salt compliance  CHF nursing education and f/u CHF clinic upon DC    8/1/22  -Continue IV diuresis for  now  -Continue OMT-Toprol XL, ACEi  -Echo showed EF of 40-45%, DD  -Will consider MPI Stress test once respiratory status improved    8/2/22  -Clinically improved, BNP trending down  -Will transition to po Lasix tomorrow  -Continue Toprol XL, ACEi  -MPI stress test tmw    8/3/22  -Stable  -Continue Toprol XL, ACEi, po Lasix  -MPI stress test today    Essential hypertension  -Titrate meds         VTE Risk Mitigation (From admission, onward)         Ordered     apixaban tablet 5 mg  2 times daily         07/31/22 0322     IP VTE HIGH RISK PATIENT  Once         07/31/22 0322     Place sequential compression device  Until discontinued         07/31/22 0322     Reason for No Pharmacological VTE Prophylaxis  Once        Question:  Reasons:  Answer:  Already adequately anticoagulated on oral Anticoagulants    07/31/22 0322                Evelyn Bird PA-C  Cardiology  [facility] - Telemetry ([facility])

## 2022-08-03 NOTE — RESPIRATORY THERAPY
Home Oxygen Evaluation - [facility] - Cardiopulmonary Department      Date Performed: 8/3/2022      1) Patient's Home O2 Sat on room air, while at rest: Room Air SpO2 At Rest: 91 %        If O2 sats on room air at rest are 88% or below, patient qualifies.  Document O2 liter flow needed in Step 2.  If O2 sats are 89% or above, complete Step 3.        2)  If patient is not ambulated and O2 sats are <88%, what is the O2 liter flow required to meet ordered saturation?      If O2 sats on room air while exercising remain 89% or above patient does not qualify, no further testing needed Document N/A in step 3. If O2 sats on room air while exercising are 88% or below, continue to Step 4.    3) Patient's O2 Sat on room air while exercising: Room Air SpO2 During Ambulation: (!) 87 %        4) Patient's O2 Sat while exercising on O2: SpO2 During Ambulation on O2: 94 % at Ambulation O2 LPM: 1 LPM         (Must show improvement from #4 for patients to qualify)

## 2022-08-03 NOTE — ASSESSMENT & PLAN NOTE
Sec to demand ischemia; h/o non obs CAD on prior cath  Cont asa, statin, bb, Ranexa  Ischemic workup once diuresed, outpt if stable without CP/SOB    8/1/22  -Continue ASA, statin, BB, Ranexa  -Ischemic evaluation once respiratory status improved    8/2/22  -Continue ASA, statin, BB, Ranexa  -MPI stress test tmw    8/3/22  -MPI stress test

## 2022-08-03 NOTE — NURSING
Saline lock removed, and cardiac monitor removed. Patient has discharge orders. Awaiting his ride home and for prescription from pharmacy to take home.

## 2022-08-04 ENCOUNTER — LAB VISIT (OUTPATIENT)
Dept: LAB | Facility: HOSPITAL | Age: 67
End: 2022-08-04
Payer: MEDICARE

## 2022-08-04 ENCOUNTER — PATIENT OUTREACH (OUTPATIENT)
Dept: ADMINISTRATIVE | Facility: CLINIC | Age: 67
End: 2022-08-04
Payer: MEDICARE

## 2022-08-04 DIAGNOSIS — I50.43 ACUTE ON CHRONIC COMBINED SYSTOLIC AND DIASTOLIC HEART FAILURE: ICD-10-CM

## 2022-08-04 DIAGNOSIS — I10 ESSENTIAL HYPERTENSION, MALIGNANT: ICD-10-CM

## 2022-08-04 DIAGNOSIS — I48.0 PAROXYSMAL ATRIAL FIBRILLATION: ICD-10-CM

## 2022-08-04 DIAGNOSIS — I50.9 HEART FAILURE, UNSPECIFIED: Primary | ICD-10-CM

## 2022-08-04 DIAGNOSIS — I50.9 HEART FAILURE, UNSPECIFIED: ICD-10-CM

## 2022-08-04 LAB
ANION GAP SERPL CALC-SCNC: 11 MMOL/L (ref 8–16)
BACTERIA SPEC AEROBE CULT: ABNORMAL
BACTERIA SPEC AEROBE CULT: ABNORMAL
BASOPHILS # BLD AUTO: 0.01 K/UL (ref 0–0.2)
BASOPHILS NFR BLD: 0.1 % (ref 0–1.9)
BUN SERPL-MCNC: 44 MG/DL (ref 8–23)
CALCIUM SERPL-MCNC: 9.2 MG/DL (ref 8.7–10.5)
CHLORIDE SERPL-SCNC: 101 MMOL/L (ref 95–110)
CO2 SERPL-SCNC: 31 MMOL/L (ref 23–29)
CREAT SERPL-MCNC: 1.2 MG/DL (ref 0.5–1.4)
DIFFERENTIAL METHOD: ABNORMAL
EOSINOPHIL # BLD AUTO: 0 K/UL (ref 0–0.5)
EOSINOPHIL NFR BLD: 0 % (ref 0–8)
ERYTHROCYTE [DISTWIDTH] IN BLOOD BY AUTOMATED COUNT: 18.3 % (ref 11.5–14.5)
EST. GFR  (NO RACE VARIABLE): >60 ML/MIN/1.73 M^2
GLUCOSE SERPL-MCNC: 58 MG/DL (ref 70–110)
GRAM STN SPEC: ABNORMAL
HCT VFR BLD AUTO: 33.8 % (ref 40–54)
HGB BLD-MCNC: 10.4 G/DL (ref 14–18)
IMM GRANULOCYTES # BLD AUTO: 0.04 K/UL (ref 0–0.04)
IMM GRANULOCYTES NFR BLD AUTO: 0.3 % (ref 0–0.5)
LYMPHOCYTES # BLD AUTO: 1.7 K/UL (ref 1–4.8)
LYMPHOCYTES NFR BLD: 13.3 % (ref 18–48)
MCH RBC QN AUTO: 26.6 PG (ref 27–31)
MCHC RBC AUTO-ENTMCNC: 30.8 G/DL (ref 32–36)
MCV RBC AUTO: 86 FL (ref 82–98)
MONOCYTES # BLD AUTO: 0.9 K/UL (ref 0.3–1)
MONOCYTES NFR BLD: 7.1 % (ref 4–15)
NEUTROPHILS # BLD AUTO: 10 K/UL (ref 1.8–7.7)
NEUTROPHILS NFR BLD: 79.2 % (ref 38–73)
NRBC BLD-RTO: 0 /100 WBC
PLATELET # BLD AUTO: 159 K/UL (ref 150–450)
PLATELET BLD QL SMEAR: ABNORMAL
PMV BLD AUTO: 11 FL (ref 9.2–12.9)
POTASSIUM SERPL-SCNC: 4.3 MMOL/L (ref 3.5–5.1)
RBC # BLD AUTO: 3.91 M/UL (ref 4.6–6.2)
SODIUM SERPL-SCNC: 143 MMOL/L (ref 136–145)
WBC # BLD AUTO: 12.66 K/UL (ref 3.9–12.7)

## 2022-08-04 PROCEDURE — 85025 COMPLETE CBC W/AUTO DIFF WBC: CPT | Performed by: FAMILY MEDICINE

## 2022-08-04 PROCEDURE — 80048 BASIC METABOLIC PNL TOTAL CA: CPT | Performed by: FAMILY MEDICINE

## 2022-08-04 NOTE — PROGRESS NOTES
C3 nurse attempted to contact Crow Green for a TCC post hospital discharge follow up call. The patient is unable to conduct the call @ this time. The patient requested a callback.    The patient has a scheduled [facility] appointment with Eunice Mora NP on 8/10/22 @ 1100. Message sent to Physician staff.

## 2022-08-04 NOTE — PROGRESS NOTES
3rd Attempt made to reach patient for TCC call. Left voicemail please call [phone] leave first name, last name, and  for Ksenia I will return your call.

## 2022-08-04 NOTE — PROGRESS NOTES
2nd attempt made to reach patient for TCC call. Pt was on the other line and requested a callback. I will return the call.

## 2022-08-05 ENCOUNTER — TELEPHONE (OUTPATIENT)
Dept: PODIATRY | Facility: CLINIC | Age: 67
End: 2022-08-05
Payer: MEDICARE

## 2022-08-05 ENCOUNTER — TELEPHONE (OUTPATIENT)
Dept: TRANSPLANT | Facility: CLINIC | Age: 67
End: 2022-08-05
Payer: MEDICARE

## 2022-08-05 LAB
BACTERIA BLD CULT: NORMAL
BACTERIA BLD CULT: NORMAL

## 2022-08-05 NOTE — TELEPHONE ENCOUNTER
I called pt for 48hr discharge follow up.  No answer. LVM for him to call back        ----- Message from Anya Aceves LPN sent at 2022  3:45 PM CDT -----  Regardin hr

## 2022-08-05 NOTE — TELEPHONE ENCOUNTER
I called pt. To f/u on hosp d/c and confirm appt for Monday. No answer. LVM for him to return my call

## 2022-08-08 ENCOUNTER — TELEPHONE (OUTPATIENT)
Dept: PODIATRY | Facility: CLINIC | Age: 67
End: 2022-08-08
Payer: MEDICARE

## 2022-08-08 ENCOUNTER — TELEPHONE (OUTPATIENT)
Dept: TRANSPLANT | Facility: CLINIC | Age: 67
End: 2022-08-08
Payer: MEDICARE

## 2022-08-09 ENCOUNTER — LAB VISIT (OUTPATIENT)
Dept: LAB | Facility: HOSPITAL | Age: 67
End: 2022-08-09
Attending: PHYSICIAN ASSISTANT
Payer: MEDICARE

## 2022-08-09 DIAGNOSIS — I50.9 HEART FAILURE, UNSPECIFIED: ICD-10-CM

## 2022-08-09 DIAGNOSIS — I10 ESSENTIAL HYPERTENSION, MALIGNANT: ICD-10-CM

## 2022-08-09 DIAGNOSIS — I50.43 ACUTE ON CHRONIC COMBINED SYSTOLIC AND DIASTOLIC HEART FAILURE: ICD-10-CM

## 2022-08-09 DIAGNOSIS — I48.0 PAROXYSMAL ATRIAL FIBRILLATION: ICD-10-CM

## 2022-08-09 DIAGNOSIS — Z12.11 COLON CANCER SCREENING: ICD-10-CM

## 2022-08-09 LAB
ANION GAP SERPL CALC-SCNC: 6 MMOL/L (ref 8–16)
BASOPHILS # BLD AUTO: 0.01 K/UL (ref 0–0.2)
BASOPHILS NFR BLD: 0.1 % (ref 0–1.9)
BUN SERPL-MCNC: 13 MG/DL (ref 8–23)
CALCIUM SERPL-MCNC: 8.5 MG/DL (ref 8.7–10.5)
CHLORIDE SERPL-SCNC: 109 MMOL/L (ref 95–110)
CO2 SERPL-SCNC: 26 MMOL/L (ref 23–29)
CREAT SERPL-MCNC: 0.9 MG/DL (ref 0.5–1.4)
DIFFERENTIAL METHOD: ABNORMAL
EOSINOPHIL # BLD AUTO: 0.1 K/UL (ref 0–0.5)
EOSINOPHIL NFR BLD: 1.6 % (ref 0–8)
ERYTHROCYTE [DISTWIDTH] IN BLOOD BY AUTOMATED COUNT: 17.5 % (ref 11.5–14.5)
EST. GFR  (NO RACE VARIABLE): >60 ML/MIN/1.73 M^2
GLUCOSE SERPL-MCNC: 97 MG/DL (ref 70–110)
HCT VFR BLD AUTO: 32.9 % (ref 40–54)
HGB BLD-MCNC: 10 G/DL (ref 14–18)
IMM GRANULOCYTES # BLD AUTO: 0.04 K/UL (ref 0–0.04)
IMM GRANULOCYTES NFR BLD AUTO: 0.5 % (ref 0–0.5)
LYMPHOCYTES # BLD AUTO: 2.4 K/UL (ref 1–4.8)
LYMPHOCYTES NFR BLD: 28.5 % (ref 18–48)
MCH RBC QN AUTO: 27.4 PG (ref 27–31)
MCHC RBC AUTO-ENTMCNC: 30.4 G/DL (ref 32–36)
MCV RBC AUTO: 90 FL (ref 82–98)
MONOCYTES # BLD AUTO: 0.7 K/UL (ref 0.3–1)
MONOCYTES NFR BLD: 8.3 % (ref 4–15)
NEUTROPHILS # BLD AUTO: 5.2 K/UL (ref 1.8–7.7)
NEUTROPHILS NFR BLD: 61 % (ref 38–73)
NRBC BLD-RTO: 0 /100 WBC
PLATELET # BLD AUTO: 148 K/UL (ref 150–450)
PMV BLD AUTO: 10.8 FL (ref 9.2–12.9)
POTASSIUM SERPL-SCNC: 4.3 MMOL/L (ref 3.5–5.1)
RBC # BLD AUTO: 3.65 M/UL (ref 4.6–6.2)
SODIUM SERPL-SCNC: 141 MMOL/L (ref 136–145)
WBC # BLD AUTO: 8.54 K/UL (ref 3.9–12.7)

## 2022-08-09 PROCEDURE — G0179 PR HOME HEALTH MD RECERTIFICATION: ICD-10-PCS | Mod: ,,, | Performed by: NURSE PRACTITIONER

## 2022-08-09 PROCEDURE — G0179 MD RECERTIFICATION HHA PT: HCPCS | Mod: ,,, | Performed by: NURSE PRACTITIONER

## 2022-08-09 PROCEDURE — 36415 COLL VENOUS BLD VENIPUNCTURE: CPT | Mod: PO | Performed by: FAMILY MEDICINE

## 2022-08-09 PROCEDURE — 85025 COMPLETE CBC W/AUTO DIFF WBC: CPT | Mod: PO | Performed by: FAMILY MEDICINE

## 2022-08-09 PROCEDURE — 80048 BASIC METABOLIC PNL TOTAL CA: CPT | Mod: PO | Performed by: FAMILY MEDICINE

## 2022-08-10 ENCOUNTER — TELEPHONE (OUTPATIENT)
Dept: INTERNAL MEDICINE | Facility: CLINIC | Age: 67
End: 2022-08-10
Payer: MEDICARE

## 2022-08-10 ENCOUNTER — PES CALL (OUTPATIENT)
Dept: ADMINISTRATIVE | Facility: CLINIC | Age: 67
End: 2022-08-10
Payer: MEDICARE

## 2022-08-10 ENCOUNTER — TELEPHONE (OUTPATIENT)
Dept: PULMONOLOGY | Facility: CLINIC | Age: 67
End: 2022-08-10
Payer: MEDICARE

## 2022-08-10 ENCOUNTER — TELEPHONE (OUTPATIENT)
Dept: SLEEP MEDICINE | Facility: CLINIC | Age: 67
End: 2022-08-10
Payer: MEDICARE

## 2022-08-10 ENCOUNTER — TELEPHONE (OUTPATIENT)
Dept: PODIATRY | Facility: CLINIC | Age: 67
End: 2022-08-10
Payer: MEDICARE

## 2022-08-10 NOTE — TELEPHONE ENCOUNTER
----- Message from Yojana Riggs sent at 8/10/2022  2:55 PM CDT -----  Contact: [phone]  Patient is requesting a portable oxygen machine,please call him at [phone].Thanks

## 2022-08-10 NOTE — TELEPHONE ENCOUNTER
----- Message from Yojana Riggs sent at 8/10/2022  2:49 PM CDT -----  Contact: [phone]  Patient is requesting that his appointments be rescheduled ,Please call back at [phone].Thanks

## 2022-08-11 ENCOUNTER — PES CALL (OUTPATIENT)
Dept: ADMINISTRATIVE | Facility: CLINIC | Age: 67
End: 2022-08-11
Payer: MEDICARE

## 2022-08-12 ENCOUNTER — PES CALL (OUTPATIENT)
Dept: ADMINISTRATIVE | Facility: CLINIC | Age: 67
End: 2022-08-12
Payer: MEDICARE

## 2022-08-15 ENCOUNTER — OUTPATIENT CASE MANAGEMENT (OUTPATIENT)
Dept: ADMINISTRATIVE | Facility: OTHER | Age: 67
End: 2022-08-15
Payer: MEDICARE

## 2022-08-15 NOTE — LETTER
August 15, 2022     August 15, 2022    Crow Jamallion  [address]  [place] [address]      Dear Mr. Green,     Welcome to [facility] Complex Care Management Program.  It was a pleasure talking with you today.  My name is Avni Potts. I look forward to working with you as your .  My goal is to help you function at the healthiest and highest level possible.  You can contact me directly at [phone].    As an [facility] patient with [employer], some of the services we may be able to provide include:      Development of an individualized care plan with a Registered Nurse    Connection with a    Connection with available resources and services     Coordinate communication among your care team members    Provide coaching and education    Help you understand your doctors treatment plan   Help you obtain information about your insurance coverage.     All services provided by [facility] Complex Care Managers and other care team members are coordinated with and communicated to your primary care team.    As part of your enrollment, you will be receiving education materials and more information about these services in your [facility] account, by phone or through the mail.  If you do not wish to participate or receive information, please contact our office at [phone].      Sincerely,    Avni Potts, RN  [facility]   Out-patient RN Complex Care Manager

## 2022-08-16 ENCOUNTER — HOSPITAL ENCOUNTER (OUTPATIENT)
Dept: RADIOLOGY | Facility: HOSPITAL | Age: 67
Discharge: HOME | End: 2022-08-16
Attending: INTERNAL MEDICINE
Payer: MEDICARE

## 2022-08-16 ENCOUNTER — OUTPATIENT CASE MANAGEMENT (OUTPATIENT)
Dept: ADMINISTRATIVE | Facility: OTHER | Age: 67
End: 2022-08-16
Payer: MEDICARE

## 2022-08-16 ENCOUNTER — HOSPITAL ENCOUNTER (OUTPATIENT)
Dept: CARDIOLOGY | Facility: HOSPITAL | Age: 67
Discharge: HOME | End: 2022-08-16
Attending: INTERNAL MEDICINE
Payer: MEDICARE

## 2022-08-16 ENCOUNTER — DOCUMENT SCAN (OUTPATIENT)
Dept: HOME HEALTH SERVICES | Facility: HOSPITAL | Age: 67
End: 2022-08-16
Payer: MEDICARE

## 2022-08-16 DIAGNOSIS — I25.118 CORONARY ARTERY DISEASE OF NATIVE ARTERY OF NATIVE HEART WITH STABLE ANGINA PECTORIS: ICD-10-CM

## 2022-08-16 LAB
CV STRESS BASE HR: 64 BPM
DIASTOLIC BLOOD PRESSURE: 76 MMHG
NUC REST EJECTION FRACTION: 57
NUC STRESS EJECTION FRACTION: 57 %
OHS CV CPX 85 PERCENT MAX PREDICTED HEART RATE MALE: 132
OHS CV CPX MAX PREDICTED HEART RATE: 155
OHS CV CPX PATIENT IS FEMALE: 0
OHS CV CPX PATIENT IS MALE: 1
OHS CV CPX PEAK DIASTOLIC BLOOD PRESSURE: 72 MMHG
OHS CV CPX PEAK HEAR RATE: 73 BPM
OHS CV CPX PEAK RATE PRESSURE PRODUCT: 9709
OHS CV CPX PEAK SYSTOLIC BLOOD PRESSURE: 133 MMHG
OHS CV CPX PERCENT MAX PREDICTED HEART RATE ACHIEVED: 47
OHS CV CPX RATE PRESSURE PRODUCT PRESENTING: 9152
STRESS ECHO POST EXERCISE DUR MIN: 1 MINUTES
STRESS ECHO POST EXERCISE DUR SEC: 4 SECONDS
SYSTOLIC BLOOD PRESSURE: 143 MMHG

## 2022-08-16 PROCEDURE — A9502 TC99M TETROFOSMIN: HCPCS

## 2022-08-16 PROCEDURE — 63600175 PHARM REV CODE 636 W HCPCS: Performed by: INTERNAL MEDICINE

## 2022-08-16 PROCEDURE — 93016 STRESS TEST WITH MYOCARDIAL PERFUSION (CUPID ONLY): ICD-10-PCS | Mod: ,,, | Performed by: INTERNAL MEDICINE

## 2022-08-16 PROCEDURE — 93016 CV STRESS TEST SUPVJ ONLY: CPT | Mod: ,,, | Performed by: INTERNAL MEDICINE

## 2022-08-16 PROCEDURE — 93017 CV STRESS TEST TRACING ONLY: CPT

## 2022-08-16 PROCEDURE — 78452 STRESS TEST WITH MYOCARDIAL PERFUSION (CUPID ONLY): ICD-10-PCS | Mod: 26,,, | Performed by: INTERNAL MEDICINE

## 2022-08-16 PROCEDURE — 93018 STRESS TEST WITH MYOCARDIAL PERFUSION (CUPID ONLY): ICD-10-PCS | Mod: ,,, | Performed by: INTERNAL MEDICINE

## 2022-08-16 PROCEDURE — 78452 HT MUSCLE IMAGE SPECT MULT: CPT | Mod: 26,,, | Performed by: INTERNAL MEDICINE

## 2022-08-16 PROCEDURE — 93018 CV STRESS TEST I&R ONLY: CPT | Mod: ,,, | Performed by: INTERNAL MEDICINE

## 2022-08-16 RX ORDER — REGADENOSON 0.08 MG/ML
0.4 INJECTION, SOLUTION INTRAVENOUS ONCE
Status: COMPLETED | OUTPATIENT
Start: 2022-08-16 | End: 2022-08-16

## 2022-08-16 RX ADMIN — REGADENOSON 0.4 MG: 0.08 INJECTION, SOLUTION INTRAVENOUS at 09:08

## 2022-08-16 NOTE — PROGRESS NOTES
Outpatient Care Management  Initial Patient Assessment    Patient: Crow Green  MRN: [id number]  Date of Service: 08/15/2022  Completed by: Avni Potts RN  Referral Date: 06/28/2022  Program: High Risk  Status: Ongoing  Effective Dates: 8/15/2022 - present  Responsible Staff: Avni Potts RN        Reason for Visit   Patient presents with    OPCM Enrollment Call    OPCM Chart Review    Plan Of Care    OPCM Welcome Letter    PHQ-9    Initial Assessment       Brief Summary:  Crow Green was referred after an ER visit for PNA. Mr. Green qualifies for program based on his risk score of 86.4%.    Active problem list, medical, surgical and social history reviewed. Phone contact made with Mr. Green and he agreed to re-enroll into OPCM. He has been in the program in the past. Mr. Green stated that he was still having issues with his cough from his PNA. We created a care plan for PNA together. He denies needing any medication refills at this time and has been taking his medications as prescribed. He verbalized that he was having financial stress at home. I will refer to social work for assistance with this. He denies any questions regarding his [employer] insurance but would like another OTC catalog. I will mail this to him today. Reviewed Mr. Green's upcoming appointments and he verbalized understanding. I will follow up with Mr. Green in ten days to continue education and management. He agrees with this plan.    Assessment Documentation     OPCM Initial Assessment    Involvement of Care  Do I have permission to speak with other family members about your care?: No  Assessment completed by: Patient  Identified Areas of Need  Advanced Care Planning: No  Housing: no  Medication Adherence: No  *Active medication list was reviewed and reconciled with patient and/or caregiver:   Nutrition: no  Lab Adherence: no  Depression: No  Cognitive/Behavioral Health: no  Communication:  no  Health Literacy: no  Fall risk?: No  Equipment/Supplies/Services: no          Problem List and History         Reviewed medical and social history with patient and/or caregiver. A complex care plan was discussed and completed today, with input from patient and/or caregiver.    Patient Instructions     Instructions were provided via the [employer] patient resources and are available for the patient to view on the patient portal, if active.    Next steps: Mail:  Welcome letter  PNA education  [employer] OTC booklet  Advance directive education  Follow up in ten days Avni Potts RN         Todays OPCM Self-Management Care Plan was developed with the patients/caregivers input and was based on identified barriers from todays assessment.  Goals were written today with the patient/caregiver and the patient has agreed to work towards these goals to improve his/her overall well-being. Patient verbalized understanding of the care plan, goals, and all of today's instructions. Encouraged patient/caregiver to communicate with his/her physician and health care team about health conditions and the treatment plan.  Provided my contact information today and encouraged patient/caregiver to call me with any questions as needed.

## 2022-08-17 ENCOUNTER — TELEPHONE (OUTPATIENT)
Dept: CARDIOLOGY | Facility: CLINIC | Age: 67
End: 2022-08-17
Payer: MEDICARE

## 2022-08-17 ENCOUNTER — OUTPATIENT CASE MANAGEMENT (OUTPATIENT)
Dept: ADMINISTRATIVE | Facility: OTHER | Age: 67
End: 2022-08-17
Payer: MEDICARE

## 2022-08-17 ENCOUNTER — PATIENT OUTREACH (OUTPATIENT)
Dept: ADMINISTRATIVE | Facility: HOSPITAL | Age: 67
End: 2022-08-17
Payer: MEDICARE

## 2022-08-17 DIAGNOSIS — I50.42 CHRONIC COMBINED SYSTOLIC AND DIASTOLIC CONGESTIVE HEART FAILURE: ICD-10-CM

## 2022-08-17 DIAGNOSIS — I50.43 ACUTE ON CHRONIC COMBINED SYSTOLIC AND DIASTOLIC HEART FAILURE: Primary | ICD-10-CM

## 2022-08-17 RX ORDER — FUROSEMIDE 40 MG/1
40 TABLET ORAL 2 TIMES DAILY
Qty: 180 TABLET | Refills: 3 | Status: SHIPPED | OUTPATIENT
Start: 2022-08-17 | End: 2022-08-29

## 2022-08-17 NOTE — TELEPHONE ENCOUNTER
Pt contacted about results pt verbalized understanding.              ----- Message from Eduard Zhao MD sent at 8/17/2022  2:52 PM CDT -----  [facility] stress test showed old scar  Continue current rx

## 2022-08-17 NOTE — TELEPHONE ENCOUNTER
----- Message from Sachi Rendon MA sent at 8/17/2022 10:53 AM CDT -----  Regarding: FW: weight gain  Contact: Brigette- [facility]    ----- Message -----  From: Lucille Campbell  Sent: 8/17/2022  10:49 AM CDT  To: Pavel Chapa Staff  Subject: weight gain                                      Ms Machuca states that patient has an 8 pound weight gain since 8/9/22 was 192, and today 200, MS Machuca would bren to be advised, please call her back at [phone]

## 2022-08-17 NOTE — PROGRESS NOTES
Outpatient Care Management   - High Risk Patient Assessment    Patient: Crow Green  MRN:  [id number]  Date of Service:  8/17/2022  Completed by:  Briana Tripp LMSW  Referral Date: 06/28/2022  Program: Case Management (High Risk)    Reason for Visit   Patient presents with    [facility] Chart Review    Social Work Assessment - High Risk     8/17/2022       Brief Summary:  [facility] for the following patient identified psycho-social needs: financial stress.  contacted patient and completed the social assessment. Pt's home health nurse was present and pt stated the home health  had just left.  He is currently living with his father as his caregiver. Pt reported finalizing his divorce a couple of weeks ago after being  for 14 years. He reported feeling relieved about this. He expressed he is having difficulty paying for utility bills. When asked about his current mental health status, pt stated he has been feeling depressed, but compliant with his psych medications and feels they are helping. He reported 3 hours of sleep during the night, but sleeps during the day.  educated patient on sleep hygiene. He does not wish to go back to [facility] at this time, but SW encouraged him to let her know when he does and she can help set it up. Pt agreed to this. Pt is getting Meals on Wheels weekly ( 5 meals, snacks and milk). He stated he sometimes does not eat the meals because they are not his preference. He has also been using [employer] [facility] [employer] and [place] [facility] transport to get him to doctor appointments. Pt agreed for  to send out community referrals to assist him with his utilities.   Care plan was created in collaboration with patient/caregiver input.     Patient Summary     [facility] Social Work Assessment (High Risk)    Involvement of Care  Do I have permission to speak with other family members  about your care?: Yes (Comment: sister, Gretchen Green)  Assessment completed by: Patient  Cognitive  Which of the following can you state?: Name, Date of birth, Address, Year, President  Cognitive barriers?: No  General  Marital status:   Current employment status: Disabled  Support  Level of Caregiver support: Assistance needed but no caregiver available (Comment: Pt has help from home health, no caregiver available)  Support system: Home care staff, Community organization  Is the caregiver reporting burnout?: No  Support Barriers?: No  Advanced Care Planning  Do you have any of the following?: None  If yes, do we have a copy?: N/A  If no, would you like Advance Directive resources?: N/A  Advance Care Planning resources provided?: No (Comment: already sent to patient)  Financial  Current medical coverage: Medicaid, SNP  Have you applied for government assistance programs?: Yes (Comment: currently receives SNAP)  Are you unable to pay any of the following?: Utilities, Food  Gross monthly income: 960  Financial Support Barriers?: Yes  Safety  Safety barriers?: No   History  Do you or your spouse currently or formerly serve in the ?: No  Disaster Plan  Established evacuation plan?: No  Registered for evacuation?: No  Ability to evacuate: Stay in home  Mental Health Status  Emotional status: Other (see comment) (Comment: depression)  Have you recenetly lost a loved one?: No  Psychiatric diagnosis: Not sure, but has been to [place] in the past  Current mental health treatment: No  Would you like mental health resources?: No  Current symptoms: Sleep disturbances (Comment: sleeping 3 hours at night and during the day)  Mental/Behavioral/Environmental risk: None  Mental Health Barriers?: No  Addictive Behaviors  Current alcohol consumption?: No  Current substance abuse?: No  Gambling habits?: No  Was the PHQ depression screening completed?: No  Was the DOMINIK-7 completed?: No         Complex Care  Plan     Care plan was discussed and completed today with input from patient and/or caregiver.    Patient Instructions     Follow up in about 1 week (around 8/24/2022) for follow up.    Todays OPCM Self-Management Care Plan was developed with the patients/caregivers input and was based on identified barriers from todays assessment.  Goals were written today with the patient/caregiver and the patient has agreed to work towards these goals to improve his/her overall well-being. Patient verbalized understanding of the care plan, goals, and all of today's instructions. Encouraged patient/caregiver to communicate with his/her physician and health care team about health conditions and the treatment plan.  Provided my contact information today and encouraged patient/caregiver to call me with any questions as needed.

## 2022-08-17 NOTE — TELEPHONE ENCOUNTER
----- Message from Sachi Rendon MA sent at 8/17/2022 10:53 AM CDT -----  Regarding: FW: weight gain  Contact: Brigette- [facility]    ----- Message -----  From: Lucille Campbell  Sent: 8/17/2022  10:49 AM CDT  To: Pavel Chapa Staff  Subject: weight gain                                      Ms Machuca states that patient has an 8 pound weight gain since 8/9/22 was 192, and today 200, MS Machuca would bren to be advised, please call her back at [phone]

## 2022-08-17 NOTE — PROGRESS NOTES
HOSPITAL F./U: I spoke with pt that stated he is unable to come for a 2:20PM appt but he will call transportation to see if they can work it in. If not he will have to change the appt. I will call him back in the am to find out if he needs it cancelled.      Home Blood Pressure Test: About This Test  What is it? A home blood pressure test allows you to keep track of your blood pressure at home. Blood pressure is a measure of the force of blood against the walls of your arteries. Blood pressure readings include two numbers, such as 130/80 (say \"130 over 80\"). The first number is the systolic pressure. The second number is the diastolic pressure. Why is this test done? You may do this test at home to:  · Find out if you have high blood pressure. · Track your blood pressure if you have high blood pressure. · Track how well medicine is working to reduce high blood pressure. · Check how lifestyle changes, such as weight loss and exercise, are affecting blood pressure. How can you prepare for the test?  · Do not use caffeine, tobacco, or medicines known to raise blood pressure (such as nasal decongestant sprays) for at least 30 minutes before taking your blood pressure. · Do not exercise for at least 30 minutes before taking your blood pressure. What happens before the test?  Take your blood pressure while you feel comfortable and relaxed. Sit quietly with both feet on the floor for at least 5 minutes before the test.  What happens during the test?  · Sit with your arm slightly bent and resting on a table so that your upper arm is at the same level as your heart. · Roll up your sleeve or take off your shirt to expose your upper arm. · Wrap the blood pressure cuff around your upper arm so that the lower edge of the cuff is about 1 inch above the bend of your elbow. Proceed with the following steps depending on if you are using an automatic or manual pressure monitor. Automatic blood pressure monitors  · Press the on/off button on the automatic monitor and wait until the ready-to-measure \"heart\" symbol appears next to zero in the display window. · Press the start button. The cuff will inflate and deflate by itself.   · Your blood pressure numbers will appear on the screen. · Write your numbers in your log book, along with the date and time. Manual blood pressure monitors  · Place the earpieces of a stethoscope in your ears, and place the bell of the stethoscope over the artery, just below the cuff. · Close the valve on the rubber inflating bulb. · Squeeze the bulb rapidly with your opposite hand to inflate the cuff until the dial or column of mercury reads about 30 mm Hg higher than your usual systolic pressure. If you do not know your usual pressure, inflate the cuff to 210 mm Hg or until the pulse at your wrist disappears. · Open the pressure valve just slightly by twisting or pressing the valve on the bulb. · As you watch the pressure slowly fall, note the level on the dial at which you first start to hear a pulsing or tapping sound through the stethoscope. This is your systolic blood pressure. · Continue letting the air out slowly. The sounds will become muffled and will finally disappear. Note the pressure when the sounds completely disappear. This is your diastolic blood pressure. Let out all the remaining air. · Write your numbers in your log book, along with the date and time. What else should you know about the test?  It is more accurate to take the average of several readings made throughout the day than to rely on a single reading. It's normal for blood pressure to go up and down throughout the day. Follow-up care is a key part of your treatment and safety. Be sure to make and go to all appointments, and call your doctor if you are having problems. It's also a good idea to keep a list of the medicines you take. Where can you learn more? Go to http://ambrose-vince.info/. Enter C427 in the search box to learn more about \"Home Blood Pressure Test: About This Test.\"  Current as of: April 9, 2019  Content Version: 12.2  © 6406-7975 [employer].  Care instructions adapted under license by [employer] (which disclaims liability or warranty for this information). If you have questions about a medical condition or this instruction, always ask your healthcare professional. [address] any warranty or liability for your use of this information.

## 2022-08-19 ENCOUNTER — TELEPHONE (OUTPATIENT)
Dept: INTERNAL MEDICINE | Facility: CLINIC | Age: 67
End: 2022-08-19
Payer: MEDICARE

## 2022-08-19 ENCOUNTER — TELEPHONE (OUTPATIENT)
Dept: PULMONOLOGY | Facility: CLINIC | Age: 67
End: 2022-08-19
Payer: MEDICARE

## 2022-08-19 NOTE — TELEPHONE ENCOUNTER
Spoke to pt about oxygen being delivered to his house pt states he was in the hospital when the oxygen was delivered to his house offered to give pt the contact information for the [employer] company pt stated he was not home and that he would callback

## 2022-08-19 NOTE — TELEPHONE ENCOUNTER
----- Message from Debi Coello sent at 8/19/2022 11:26 AM CDT -----  Contact: Crow  Patient is calling to speak  with the nurse regarding, Oxygen Tank. Explains need to know hoe to operate the tank. Please give patient a call back at [phone] as requested.  Thanks  LR

## 2022-08-19 NOTE — TELEPHONE ENCOUNTER
----- Message from Skylar Whitt sent at 8/19/2022  1:07 PM CDT -----  Please call pt @ [phone] pt states he have questions for Jocelyn

## 2022-08-19 NOTE — TELEPHONE ENCOUNTER
----- Message from Lilly Douglas MA sent at 8/18/2022  4:12 PM CDT -----  Pt would like to consult with a nurse in regards to oxygen tanks that was delivered to his home. Pt stated that he needs instructions on  how to use the tanks

## 2022-08-22 ENCOUNTER — OUTPATIENT CASE MANAGEMENT (OUTPATIENT)
Dept: ADMINISTRATIVE | Facility: OTHER | Age: 67
End: 2022-08-22
Payer: MEDICARE

## 2022-08-22 NOTE — PROGRESS NOTES
"Pt contacted SW regarding his hospital bed not functioning correctly and his rollator "falling a part". SW reached out to [facility] where pt received equipment from. Pt is eligible for a new rollator in September. Pt can also reach out to the [facility] office to see if they troubleshoot the issues with the hospital bed over the phone, if not, pt would be responsible for $30 service charge for a tech to go to his home. This information was relayed to the patient.     "

## 2022-08-23 ENCOUNTER — TELEPHONE (OUTPATIENT)
Dept: TRANSPLANT | Facility: CLINIC | Age: 67
End: 2022-08-23
Payer: MEDICARE

## 2022-08-23 NOTE — TELEPHONE ENCOUNTER
Called and spoke with pt about appt scheduled for today.   Pt informed was not aware of appt today, unable to reschedule until Monday, 8/29/2022 due to transportation issues.

## 2022-08-24 ENCOUNTER — TELEPHONE (OUTPATIENT)
Dept: INTERNAL MEDICINE | Facility: CLINIC | Age: 67
End: 2022-08-24
Payer: MEDICARE

## 2022-08-24 ENCOUNTER — EXTERNAL HOME HEALTH (OUTPATIENT)
Dept: HOME HEALTH SERVICES | Facility: HOSPITAL | Age: 67
End: 2022-08-24
Payer: MEDICARE

## 2022-08-24 ENCOUNTER — OUTPATIENT CASE MANAGEMENT (OUTPATIENT)
Dept: ADMINISTRATIVE | Facility: OTHER | Age: 67
End: 2022-08-24
Payer: MEDICARE

## 2022-08-24 NOTE — TELEPHONE ENCOUNTER
----- Message from Skylar Whitt sent at 8/24/2022 10:48 AM CDT -----  Pt states transportation did not come pick him up, pt will not be coming, pl;[facility] pt @ [phone].

## 2022-08-24 NOTE — PROGRESS NOTES
Outpatient Care Management   - Care Plan Follow Up    Patient: Crow Green  MRN:  [id number]  Date of Service:  8/24/2022  Completed by:  Briana Tripp LMSW  Referral Date: 06/28/2022    Reason for Visit   Patient presents with    Update Plan Of Care     8/24/2022       Brief Summary:  spoke with pt via telephone for a follow up. He stated he has not heard from any agencies regarding utility assistance. According to Tom Pritchard, pt was accepted by [facility] Support program.  also gave him contact information for a local [employer] they may be able to assist him.  followed up with him in regards to his hospital bed and he is going to call the DME number when home health is present to help troubleshoot the issues. Pt agreed to call the [employer] for assistance.  will follow up with him in a week.     Complex Care Plan     Care plan was discussed and completed today with input from patient and/or caregiver.    Patient Instructions     No follow-ups on file.    Todays OPCM Self-Management Care Plan was developed with the patients/caregivers input and was based on identified barriers from todays assessment.  Goals were written today with the patient/caregiver and the patient has agreed to work towards these goals to improve his/her overall well-being. Patient verbalized understanding of the care plan, goals, and all of today's instructions. Encouraged patient/caregiver to communicate with his/her physician and health care team about health conditions and the treatment plan.  Provided my contact information today and encouraged patient/caregiver to call me with any questions as needed.

## 2022-08-29 ENCOUNTER — DOCUMENT SCAN (OUTPATIENT)
Dept: HOME HEALTH SERVICES | Facility: HOSPITAL | Age: 67
End: 2022-08-29
Payer: MEDICARE

## 2022-08-29 ENCOUNTER — TELEPHONE (OUTPATIENT)
Dept: PULMONOLOGY | Facility: CLINIC | Age: 67
End: 2022-08-29
Payer: MEDICARE

## 2022-08-29 ENCOUNTER — OFFICE VISIT (OUTPATIENT)
Dept: CARDIOLOGY | Facility: CLINIC | Age: 67
End: 2022-08-29
Payer: MEDICARE

## 2022-08-29 DIAGNOSIS — I50.42 CHRONIC COMBINED SYSTOLIC AND DIASTOLIC CONGESTIVE HEART FAILURE: ICD-10-CM

## 2022-08-29 DIAGNOSIS — I25.110 ATHEROSCLEROSIS OF NATIVE CORONARY ARTERY OF NATIVE HEART WITH UNSTABLE ANGINA PECTORIS: ICD-10-CM

## 2022-08-29 DIAGNOSIS — I50.43 ACUTE ON CHRONIC COMBINED SYSTOLIC AND DIASTOLIC HEART FAILURE: Primary | ICD-10-CM

## 2022-08-29 DIAGNOSIS — I25.118 CORONARY ARTERY DISEASE OF NATIVE ARTERY OF NATIVE HEART WITH STABLE ANGINA PECTORIS: Chronic | ICD-10-CM

## 2022-08-29 PROCEDURE — 4010F PR ACE/ARB THEARPY RXD/TAKEN: ICD-10-PCS | Mod: CPTII,S$GLB,, | Performed by: PHYSICIAN ASSISTANT

## 2022-08-29 PROCEDURE — 1111F DSCHRG MED/CURRENT MED MERGE: CPT | Mod: CPTII,S$GLB,, | Performed by: PHYSICIAN ASSISTANT

## 2022-08-29 PROCEDURE — 1159F MED LIST DOCD IN RCRD: CPT | Mod: CPTII,S$GLB,, | Performed by: PHYSICIAN ASSISTANT

## 2022-08-29 PROCEDURE — 4010F ACE/ARB THERAPY RXD/TAKEN: CPT | Mod: CPTII,S$GLB,, | Performed by: PHYSICIAN ASSISTANT

## 2022-08-29 PROCEDURE — 1111F PR DISCHARGE MEDS RECONCILED W/ CURRENT OUTPATIENT MED LIST: ICD-10-PCS | Mod: CPTII,S$GLB,, | Performed by: PHYSICIAN ASSISTANT

## 2022-08-29 PROCEDURE — 1160F PR REVIEW ALL MEDS BY PRESCRIBER/CLIN PHARMACIST DOCUMENTED: ICD-10-PCS | Mod: CPTII,S$GLB,, | Performed by: PHYSICIAN ASSISTANT

## 2022-08-29 PROCEDURE — 99999 PR PBB SHADOW E&M-EST. PATIENT-LVL III: ICD-10-PCS | Mod: PBBFAC,,, | Performed by: PHYSICIAN ASSISTANT

## 2022-08-29 PROCEDURE — 3044F HG A1C LEVEL LT 7.0%: CPT | Mod: CPTII,S$GLB,, | Performed by: PHYSICIAN ASSISTANT

## 2022-08-29 PROCEDURE — 99204 PR OFFICE/OUTPT VISIT, NEW, LEVL IV, 45-59 MIN: ICD-10-PCS | Mod: S$GLB,,, | Performed by: PHYSICIAN ASSISTANT

## 2022-08-29 PROCEDURE — 1160F RVW MEDS BY RX/DR IN RCRD: CPT | Mod: CPTII,S$GLB,, | Performed by: PHYSICIAN ASSISTANT

## 2022-08-29 PROCEDURE — 99999 PR PBB SHADOW E&M-EST. PATIENT-LVL III: CPT | Mod: PBBFAC,,, | Performed by: PHYSICIAN ASSISTANT

## 2022-08-29 PROCEDURE — 3044F PR MOST RECENT HEMOGLOBIN A1C LEVEL <7.0%: ICD-10-PCS | Mod: CPTII,S$GLB,, | Performed by: PHYSICIAN ASSISTANT

## 2022-08-29 PROCEDURE — 1159F PR MEDICATION LIST DOCUMENTED IN MEDICAL RECORD: ICD-10-PCS | Mod: CPTII,S$GLB,, | Performed by: PHYSICIAN ASSISTANT

## 2022-08-29 PROCEDURE — 99204 OFFICE O/P NEW MOD 45 MIN: CPT | Mod: S$GLB,,, | Performed by: PHYSICIAN ASSISTANT

## 2022-08-29 RX ORDER — FUROSEMIDE 40 MG/1
80 TABLET ORAL 2 TIMES DAILY
Qty: 60 TABLET | Refills: 3 | Status: ON HOLD | OUTPATIENT
Start: 2022-08-29 | End: 2022-09-25 | Stop reason: SDUPTHER

## 2022-08-29 RX ORDER — SACUBITRIL AND VALSARTAN 24; 26 MG/1; MG/1
1 TABLET, FILM COATED ORAL 2 TIMES DAILY
Qty: 60 TABLET | Refills: 0 | Status: ON HOLD | OUTPATIENT
Start: 2022-08-29 | End: 2022-09-25 | Stop reason: HOSPADM

## 2022-08-29 NOTE — PROGRESS NOTES
[facility] Provider Note (Initial Clinic) Consult Note    Date of original referral: 8/3/2022  Age: 67 y.o.  Gender: male  Ethnicity: Not  or /a   Number of admissions for CHF within the preceding year: 2  Duration of CHF: 3 yrs  Type of Congestive Heart Failure: Combined   Etiology: Non-ischemic    Social history: remote hx drug screen positive   Enrolled in Infusion suite: no    Diagnostic Labs:   EKG - 08/01/2022  CXR - 07/30/2022  ECHO - 07/31/2022  Stress test -   Stress echo - 08/16/2022  Pharmacologic stress -   Cardiac catheterization -    Cardiac MRI -     Lab Results   Component Value Date     08/09/2022     08/04/2022    K 4.3 08/09/2022    K 4.3 08/04/2022     08/09/2022     08/04/2022    CO2 26 08/09/2022    CO2 31 (H) 08/04/2022    GLU 97 08/09/2022    GLU 58 (L) 08/04/2022    BUN 13 08/09/2022    BUN 44 (H) 08/04/2022    CREATININE 0.9 08/09/2022    CREATININE 1.2 08/04/2022    CALCIUM 8.5 (L) 08/09/2022    CALCIUM 9.2 08/04/2022    PROT 6.5 08/02/2022    PROT 6.0 08/01/2022    ALBUMIN 2.6 (L) 08/02/2022    ALBUMIN 2.5 (L) 08/01/2022    BILITOT 0.3 08/02/2022    BILITOT 0.4 08/01/2022    ALKPHOS 67 08/02/2022    ALKPHOS 67 08/01/2022    AST 15 08/02/2022    AST 12 08/01/2022    ALT 14 08/02/2022    ALT 15 08/01/2022    ANIONGAP 6 (L) 08/09/2022    ANIONGAP 11 08/04/2022    ESTGFRAFRICA >60 07/31/2022    ESTGFRAFRICA >60 07/30/2022    EGFRNONAA >60 07/31/2022    EGFRNONAA >60 07/30/2022       Lab Results   Component Value Date    WBC 8.54 08/09/2022    WBC 12.66 08/04/2022    RBC 3.65 (L) 08/09/2022    RBC 3.91 (L) 08/04/2022    HGB 10.0 (L) 08/09/2022    HGB 10.4 (L) 08/04/2022    HCT 32.9 (L) 08/09/2022    HCT 33.8 (L) 08/04/2022    MCV 90 08/09/2022    MCV 86 08/04/2022    MCH 27.4 08/09/2022    MCH 26.6 (L) 08/04/2022    MCHC 30.4 (L) 08/09/2022    MCHC 30.8 (L) 08/04/2022    RDW 17.5 (H) 08/09/2022    RDW 18.3 (H) 08/04/2022     (L) 08/09/2022      08/04/2022    MPV 10.8 08/09/2022    MPV 11.0 08/04/2022    IMMGR 0.5 08/09/2022    IMMGR 0.3 08/04/2022    IGABS 0.04 08/09/2022    IGABS 0.04 08/04/2022    LYMPH 2.4 08/09/2022    LYMPH 28.5 08/09/2022    MONO 0.7 08/09/2022    MONO 8.3 08/09/2022    EOS 0.1 08/09/2022    EOS 0.0 08/04/2022    BASO 0.01 08/09/2022    BASO 0.01 08/04/2022    NRBC 0 08/09/2022    NRBC 0 08/04/2022    GRAN 5.2 08/09/2022    GRAN 61.0 08/09/2022    EOSINOPHIL 1.6 08/09/2022    EOSINOPHIL 0.0 08/04/2022    BASOPHIL 0.1 08/09/2022    BASOPHIL 0.1 08/04/2022    PLTEST Appears normal 08/04/2022    PLTEST Clumped (A) 08/02/2022    ANISO Slight 08/07/2019    HYPO Occasional 08/07/2019       Lab Results   Component Value Date     (H) 08/02/2022     (H) 07/30/2022    MG 2.0 07/31/2022    MG 2.1 03/04/2022    PHOS 2.6 (L) 03/04/2022    PHOS 3.2 06/18/2021    TROPONINI 0.138 (H) 07/31/2022    TROPONINI 0.151 (H) 07/31/2022    HGBA1C 6.0 (H) 07/31/2022    HGBA1C 6.4 (H) 06/10/2022    TSH 1.529 07/27/2020    TSH 1.781 08/07/2019       Lab Results   Component Value Date    IRON 44 (L) 03/03/2022    TIBC 376 03/03/2022    FERRITIN 24 03/03/2022    CHOL 152 06/10/2022    TRIG 101 06/10/2022    HDL 43 06/10/2022    LDLCALC 88.8 06/10/2022    CHOLHDL 28.3 06/10/2022    TOTALCHOLEST 3.5 06/10/2022    NONHDLCHOL 109 06/10/2022    COLORU Yellow 07/30/2022    APPEARANCEUA Clear 07/30/2022    PHUR 7.0 07/30/2022    SPECGRAV 1.025 07/30/2022    PROTEINUA 2+ (A) 07/30/2022    GLUCUA 4+ (A) 07/30/2022    KETONESU Negative 07/30/2022    BILIRUBINUA Negative 07/30/2022    OCCULTUA Negative 07/30/2022    NITRITE Negative 07/30/2022    LEUKOCYTESUR Negative 07/30/2022       List all implanted cardiac devices:   none    Current Outpatient Medications on File Prior to Visit   Medication Sig Dispense Refill    acetaminophen (TYLENOL) 500 MG tablet Take 2 tablets (1,000 mg total) by mouth every 8 (eight) hours as needed for Pain. 60 tablet 0     albuterol (PROVENTIL) 2.5 mg /3 mL (0.083 %) nebulizer solution Take 3 mLs (2.5 mg total) by nebulization every 4 to 6 hours as needed for Wheezing or Shortness of Breath. 360 mL 11    allopurinoL (ZYLOPRIM) 100 MG tablet Take 1 tablet (100 mg total) by mouth once daily. 90 tablet 1    aspirin (ECOTRIN) 81 MG EC tablet Take 1 tablet (81 mg total) by mouth once daily.      atorvastatin (LIPITOR) 40 MG tablet TAKE 1 TABLET BY MOUTH EVERY EVENING 90 tablet 3    azaTHIOprine (IMURAN) 50 mg Tab Take 1 tablet (50 mg total) by mouth once daily. 90 tablet 0    baclofen (LIORESAL) 10 MG tablet Take 1 tablet (10 mg total) by mouth once daily. 90 tablet 0    blood sugar diagnostic Strp 1 each by Misc.(Non-Drug; Combo Route) route 4 (four) times daily. 300 each 3    blood-glucose meter (TRUE METRIX GLUCOSE METER) kit Use as instructed to test blood glucose meter daily. 1 each 1    ELIQUIS 5 mg Tab Take 1 tablet (5 mg total) by mouth 2 (two) times daily. 180 tablet 1    fluticasone-salmeterol diskus inhaler 250-50 mcg Inhale 1 puff into the lungs 2 (two) times daily. Controller 180 each 3    gabapentin (NEURONTIN) 800 MG tablet Take 1 tablet (800 mg total) by mouth 3 (three) times daily. 270 tablet 4    HYDROcodone-acetaminophen (NORCO) 7.5-325 mg per tablet SMARTSI Tablet(s) By Mouth Every 12 Hours PRN      insulin aspart U-100 (NOVOLOG FLEXPEN U-100 INSULIN) 100 unit/mL (3 mL) InPn pen INJECT 10 UNITS SUBCUTANEOUSLY THREE TIMES DAILY WITH MEALS 15 mL 0    insulin degludec (TRESIBA FLEXTOUCH U-200) 200 unit/mL (3 mL) insulin pen Inject 40 Units into the skin once daily. (Patient taking differently: Inject 38 Units into the skin once daily.) 6 pen 3    ipratropium (ATROVENT) 0.02 % nebulizer solution INHALE THE CONTENTS OF 1 VIAL VIA NEBULIZER FOUR TIMES DAILY 1 Box 0    isosorbide mononitrate (IMDUR) 60 MG 24 hr tablet Take 1 tablet (60 mg total) by mouth 2 (two) times a day. 180 tablet 2    JARDIANCE 25 mg tablet TAKE 1  TABLET(25 MG) BY MOUTH EVERY DAY 90 tablet 1    latanoprost 0.005 % ophthalmic solution INSTILL 1 DROP INTO BOTH EYES ONE TIME DAILY. BOTTLE EXPIRES 42 DAYS AFTER OPENING 3 mL 4    metoprolol succinate (TOPROL-XL) 50 MG 24 hr tablet Take 1 tablet (50 mg total) by mouth every evening. 90 tablet 3    MICRO THIN LANCETS 33 gauge Misc       OZEMPIC 1 mg/dose (2 mg/1.5 mL) PnIj Inject 1 mg into the skin every 7 days. 6 pen 1    pantoprazole (PROTONIX) 40 MG tablet Take 1 tablet (40 mg total) by mouth once daily. 90 tablet 1    ranolazine (RANEXA) 1,000 mg Tb12 Take 1 tablet (1,000 mg total) by mouth 2 (two) times daily. 60 tablet 11    sertraline (ZOLOFT) 100 MG tablet Take 1 tablet (100 mg total) by mouth every evening. 90 tablet 1    tacrolimus (PROTOPIC) 0.1 % ointment Apply topically 2 (two) times daily. 100 g 2    tamsulosin (FLOMAX) 0.4 mg Cap Take 1 capsule (0.4 mg total) by mouth once daily. 90 capsule 1    tiotropium (SPIRIVA WITH HANDIHALER) 18 mcg inhalation capsule INHALE THE CONTENTS OF 1 CAPSULE ONE TIME DAILY (CONTROLLER) 90 capsule 3    traMADoL (ULTRAM) 50 mg tablet Take 1 tablet (50 mg total) by mouth every 8 (eight) hours as needed for Pain. 15 tablet 0    traZODone (DESYREL) 100 MG tablet Take 2 tablets (200 mg total) by mouth every evening. 180 tablet 1    triamcinolone acetonide 0.1% (KENALOG) 0.1 % ointment Apply topically 2 (two) times daily. Use on legs and back 454 g 0    VIOS AEROSOL DELIVERY SYSTEM Shefali USE AS DIRESTED  0    [DISCONTINUED] furosemide (LASIX) 40 MG tablet Take 1 tablet (40 mg total) by mouth 2 (two) times a day. 180 tablet 3    [DISCONTINUED] lisinopriL 10 MG tablet Take 1 tablet (10 mg total) by mouth once daily. 90 tablet 2    [DISCONTINUED] amLODIPine (NORVASC) 2.5 MG tablet Take 1 tablet (2.5 mg total) by mouth every evening. 90 tablet 1    [DISCONTINUED] diclofenac sodium (VOLTAREN) 1 % Gel APPLY 2 GRAMS TOPICALLY FOUR TIMES DAILY 600 g 2    [DISCONTINUED] nitroGLYCERIN  (NITROSTAT) 0.4 MG SL tablet Place 1 tablet (0.4 mg total) under the tongue every 5 (five) minutes as needed for Chest pain. 50 tablet 12     No current facility-administered medications on file prior to visit.         HPI:  Patient does not ambulate in scooter   Patient sleeps on 2 pillows   Patient wakes up SOB, has to get out of bed, associated cough, sputum denies   Palpitations - none   Dizzy, light-headed, pre-syncope or syncope none   Since discharge frequency of performing weights, home weight and weight change-has a scale but no weight log   Other information felt pertinent to HPI    Crow Green is a 67 y.o. male patient with a PMHx of asthma, atrial fibrillation with RVR, CHF, chronic obstructive pulmonary disease, DM, elevated PSA, HTN, MI who presents to the Emergency Department for evaluation of mid-sternal chest pain which onset gradually yesterday. Pt also c/o a cough and feeling weak. In the ED O2 sats 86%, RR 30, CBC unremarkable, , Troponin 0.155, CXR showed Left basilar opacity concerning for atelectasis or infection.      he has been started on Lasix IV. Patient was also started on IV antibiotic for concerning for athelectasis vs infection on chest X ray. As of 8/1/2022, patient continue to experience shortness of breath, cough and chest congestion.     Patient underwent stress test which showed scar in the apical, anteroapical and inferoapical wall. There are no other significant perfusion abnormalities.    Today he reports for first TCC visit post dc 2 weeks ago. Arrives in scooter, does not ambulate much. Has nurse that does meds so is unsure of what is taking. Denies any SOB but does endorse more midsternal CP, took a NTG yesterday. Not worse with activity, unchanged from hsopital. Did have a low BG reading with it         PHYSICAL: There were no vitals filed for this visit.   Wt Readings from Last 3 Encounters:   08/03/22 89.8 kg (198 lb)   07/18/22 85 kg (187 lb 6.3 oz)    06/28/22 88.3 kg (194 lb 10.7 oz)       JVD: yes   Heart rhythm: regular  Cardiac murmur: No    S3: no  S4: no  Lungs: clear  Liver span: 10 cm:   Hepatojugular reflux: no  Edema: no      ASSESSMENT: acute on chronic systolic HF    PLAN:      Patient Instructions:   Instruct the patient to notify this clinic if HH, a physician or an advanced care provider wants to change medication one of their HF medications   Activity and Diet restrictions:   Recommend 2-3 gram sodium restriction and 1500cc- 2000cc fluid restriction.  Encourage physical activity with graded exercise program.  Requested patient to weigh themselves daily, and to notify us if their weight increases by more than 3 lbs in 1 day or 5 lbs in 3 days.    Assigned dry weight on home scale: no weights, asked to start log, clinic is 85 kg  Medication changes (include current dose and changed dose) increase lasix to 80 BID  Stop lisinopril  Start entresto 24 26 BID  Continue jardiance  Continue metoprolol 50 nightly   Upcoming labs and date anticipated: labs today  RTC 1 week

## 2022-08-29 NOTE — TELEPHONE ENCOUNTER
Chronic Disease Management:   Called patient to confirm Pulmonary Disease Management appointment. Patient confirmed. Advised patient to bring respiratory inhalers to visit.

## 2022-08-30 ENCOUNTER — OUTPATIENT CASE MANAGEMENT (OUTPATIENT)
Dept: ADMINISTRATIVE | Facility: OTHER | Age: 67
End: 2022-08-30

## 2022-08-30 ENCOUNTER — CLINICAL SUPPORT (OUTPATIENT)
Dept: PULMONOLOGY | Facility: CLINIC | Age: 67
End: 2022-08-30
Payer: MEDICARE

## 2022-08-30 VITALS
HEART RATE: 76 BPM | WEIGHT: 193.56 LBS | OXYGEN SATURATION: 100 % | RESPIRATION RATE: 16 BRPM | HEIGHT: 65 IN | BODY MASS INDEX: 32.25 KG/M2

## 2022-08-30 DIAGNOSIS — I50.1 CARDIAC ASTHMA: ICD-10-CM

## 2022-08-30 PROCEDURE — 99999 PR PBB SHADOW E&M-EST. PATIENT-LVL II: CPT | Mod: PBBFAC,,,

## 2022-08-30 PROCEDURE — 99999 PR PBB SHADOW E&M-EST. PATIENT-LVL II: ICD-10-PCS | Mod: PBBFAC,,,

## 2022-08-30 NOTE — PROGRESS NOTES
Pulmonary Disease Management  [facility]  Initial Visit    Referring Provider: MD Nixon  Diagnosis: Acute on chronic respiratory failure with hypoxia, Chronic obstructive pulmonary disease, H/O Asthma, Hypoxia  Last Hospital Admission: 7/30/22  Last provider visit: 11/24/21      Current Outpatient Medications:     acetaminophen (TYLENOL) 500 MG tablet, Take 2 tablets (1,000 mg total) by mouth every 8 (eight) hours as needed for Pain., Disp: 60 tablet, Rfl: 0    albuterol (PROVENTIL) 2.5 mg /3 mL (0.083 %) nebulizer solution, Take 3 mLs (2.5 mg total) by nebulization every 4 to 6 hours as needed for Wheezing or Shortness of Breath., Disp: 360 mL, Rfl: 11    allopurinoL (ZYLOPRIM) 100 MG tablet, Take 1 tablet (100 mg total) by mouth once daily., Disp: 90 tablet, Rfl: 1    aspirin (ECOTRIN) 81 MG EC tablet, Take 1 tablet (81 mg total) by mouth once daily., Disp: , Rfl:     atorvastatin (LIPITOR) 40 MG tablet, TAKE 1 TABLET BY MOUTH EVERY EVENING, Disp: 90 tablet, Rfl: 3    azaTHIOprine (IMURAN) 50 mg Tab, Take 1 tablet (50 mg total) by mouth once daily., Disp: 90 tablet, Rfl: 0    baclofen (LIORESAL) 10 MG tablet, Take 1 tablet (10 mg total) by mouth once daily., Disp: 90 tablet, Rfl: 0    blood sugar diagnostic Strp, 1 each by Misc.(Non-Drug; Combo Route) route 4 (four) times daily., Disp: 300 each, Rfl: 3    blood-glucose meter (TRUE METRIX GLUCOSE METER) kit, Use as instructed to test blood glucose meter daily., Disp: 1 each, Rfl: 1    ELIQUIS 5 mg Tab, Take 1 tablet (5 mg total) by mouth 2 (two) times daily., Disp: 180 tablet, Rfl: 1    fluticasone-salmeterol diskus inhaler 250-50 mcg, Inhale 1 puff into the lungs 2 (two) times daily. Controller, Disp: 180 each, Rfl: 3    furosemide (LASIX) 40 MG tablet, Take 2 tablets (80 mg total) by mouth 2 (two) times a day., Disp: 60 tablet, Rfl: 3    gabapentin (NEURONTIN) 800 MG tablet, Take 1 tablet (800 mg total) by mouth 3 (three) times daily., Disp: 270  tablet, Rfl: 4    HYDROcodone-acetaminophen (NORCO) 7.5-325 mg per tablet, SMARTSI Tablet(s) By Mouth Every 12 Hours PRN, Disp: , Rfl:     insulin aspart U-100 (NOVOLOG FLEXPEN U-100 INSULIN) 100 unit/mL (3 mL) InPn pen, INJECT 10 UNITS SUBCUTANEOUSLY THREE TIMES DAILY WITH MEALS, Disp: 15 mL, Rfl: 0    insulin degludec (TRESIBA FLEXTOUCH U-200) 200 unit/mL (3 mL) insulin pen, Inject 40 Units into the skin once daily. (Patient taking differently: Inject 38 Units into the skin once daily.), Disp: 6 pen, Rfl: 3    ipratropium (ATROVENT) 0.02 % nebulizer solution, INHALE THE CONTENTS OF 1 VIAL VIA NEBULIZER FOUR TIMES DAILY, Disp: 1 Box, Rfl: 0    isosorbide mononitrate (IMDUR) 60 MG 24 hr tablet, Take 1 tablet (60 mg total) by mouth 2 (two) times a day., Disp: 180 tablet, Rfl: 2    JARDIANCE 25 mg tablet, TAKE 1 TABLET(25 MG) BY MOUTH EVERY DAY, Disp: 90 tablet, Rfl: 1    latanoprost 0.005 % ophthalmic solution, INSTILL 1 DROP INTO BOTH EYES ONE TIME DAILY. BOTTLE EXPIRES 42 DAYS AFTER OPENING, Disp: 3 mL, Rfl: 4    metoprolol succinate (TOPROL-XL) 50 MG 24 hr tablet, Take 1 tablet (50 mg total) by mouth every evening., Disp: 90 tablet, Rfl: 3    MICRO THIN LANCETS 33 gauge Misc, , Disp: , Rfl:     OZEMPIC 1 mg/dose (2 mg/1.5 mL) PnIj, Inject 1 mg into the skin every 7 days., Disp: 6 pen, Rfl: 1    pantoprazole (PROTONIX) 40 MG tablet, Take 1 tablet (40 mg total) by mouth once daily., Disp: 90 tablet, Rfl: 1    ranolazine (RANEXA) 1,000 mg Tb12, Take 1 tablet (1,000 mg total) by mouth 2 (two) times daily., Disp: 60 tablet, Rfl: 11    sacubitriL-valsartan (ENTRESTO) 24-26 mg per tablet, Take 1 tablet by mouth 2 (two) times daily., Disp: 60 tablet, Rfl: 0    sertraline (ZOLOFT) 100 MG tablet, Take 1 tablet (100 mg total) by mouth every evening., Disp: 90 tablet, Rfl: 1    tacrolimus (PROTOPIC) 0.1 % ointment, Apply topically 2 (two) times daily., Disp: 100 g, Rfl: 2    tamsulosin (FLOMAX) 0.4 mg Cap, Take 1 capsule  "(0.4 mg total) by mouth once daily., Disp: 90 capsule, Rfl: 1    tiotropium (SPIRIVA WITH HANDIHALER) 18 mcg inhalation capsule, INHALE THE CONTENTS OF 1 CAPSULE ONE TIME DAILY (CONTROLLER), Disp: 90 capsule, Rfl: 3    traMADoL (ULTRAM) 50 mg tablet, Take 1 tablet (50 mg total) by mouth every 8 (eight) hours as needed for Pain., Disp: 15 tablet, Rfl: 0    traZODone (DESYREL) 100 MG tablet, Take 2 tablets (200 mg total) by mouth every evening., Disp: 180 tablet, Rfl: 1    triamcinolone acetonide 0.1% (KENALOG) 0.1 % ointment, Apply topically 2 (two) times daily. Use on legs and back, Disp: 454 g, Rfl: 0    VIOS AEROSOL DELIVERY SYSTEM Shefali, USE AS DIRESTED, Disp: , Rfl: 0    Review of patient's allergies indicates:   Allergen Reactions    Protamine Hives     Urticaria, possible upper airway swelling       Smoking history:   Social History     Tobacco Use   Smoking Status Never   Smokeless Tobacco Never       Pulse: 76  SpO2: 100 %  Resp: 16  Height: 5' 5" (165.1 cm)  Weight: 87.8 kg (193 lb 9 oz)  BMI (kg/m2): 32.28  Resting Bridgette Dyspnea Rating : 5-6   Current Oxygen Use: Yes  Device: Nasal Cannula  Liter Flow: 2  Oxygen Usage Duration: Constantly  DME Provider: [facility]   Does the patient use BiPAP, CPAP or Trilogy?: No       ACT Questionnaire:  Asthma Control Test  In the past 4  weeks, how much of the time did your asthma keep you from getting as much done at work, school or at home?: All of the time  During the past 4 weeks, how often have you had shortness of breath?: More than once a day  During the past 4 weeks, how often did your asthma symptoms (wheezing, couging, shortness of breath, chest tightness or pain) wake you up at night or earlier than usual in the morning?: 4 or more nights a week  During the past 4 weeks, how often have you used your rescue inhaler or nebulizer medication (such as albuterol)?: 3 or more times per day  How would you rate your asthma control during the past 4 weeks?: Poorly " controlled  If your score is 19 or less, your asthma may not be under control: 6      Has this patient had any pulmonary studies (PFTS)?: Yes  PFT Results:  Pre FVC   Date/Time Value Ref Range Status   08/07/2019 01:29 PM 2.26 (L) 2.38 - 4.06 L Final     Pre FEV1   Date/Time Value Ref Range Status   08/07/2019 01:29 PM 1.85 1.82 - 3.25 L Final     Pre FEV1 FVC   Date/Time Value Ref Range Status   08/07/2019 01:29 PM 81.62 66.68 - 90.72 % Final     Pre TLC   Date/Time Value Ref Range Status   08/07/2019 01:29 PM 3.71 (L) 4.95 - 7.25 L Final     Pre DLCO   Date/Time Value Ref Range Status   08/07/2019 01:29 PM 11.16 (L) 17.59 - 31.44 ml/(min*mmHg) Final         Is this patient a candidate for pulmonary rehab?: No  Method Used for Education: Literature, Demonstration, Verbal  Did the patient demonstrate understanding?: Yes  What tests has the patient had done today?: Spirometry, Six Minute Walk          Educational assessment:   [x]            Good  []            Fair  []            Poor    Readiness to learn:   [x]            Good  []            Fair  []            Poor    Vision Status:   [x]            Good  []            Fair  []            Poor    Reading Ability:  [x]            Good  []            Fair  []            Poor    Knowledge of condition:   [x]            Good  []            Fair  []            Poor    Language Barriers:   []            Good  []            Fair  []            Poor  [x]            None    Cognitive/ Physical Barriers:   []            Good  []            Fair  []            Poor  [x]            None    Learning best by:                       [x]            Seeing  []            Hearing  []            Reading                         [x]            Doing    Describe any barrier /Limitation or financial implications of care choices identified     []            Financial  []            Emotional  []            Education  []            Vision/Hearing  []            Physical  [x]             None  []                TOPIC /CONTENT FOR TODAY:    [x]            MDI with or without spacer  [x]            Dry powder inhaler  []            Acapella   []           Peak Flow meter  [x]             action plan  []            Nebulizer use  [x]            Oxygen use safety  [x]            Breathing and cough techniques  [x]            Energy conservation  [x]            Infection prevention  []           OTHER________________________        Learner:    [x]            Patient   []            Caregiver    Method:    [x]            Verbal explanation  [x]            Audio visual    [x]            Literature  [x]            Teach back      Evaluation:    [x]            Teach back  [x]            Demonstrate  [x]            Follow up phone call    []            2 weeks     [x]            4 weeks   [x]            PRN        Therapist Comments:   Patient was seen today to review respiratory medication purpose and proper technique for use of inhalers. Patient practiced proper technique using MDI with valved holding chamber (spacer) and DPI inhalers. Patient demonstrated understanding. Literature was given to patient.     Reviewed breathing techniques such as pursed-lip breathing, mendoza-cough technique, and diaphragmatic breathing. Patient practiced proper technique and verbalized understanding. Literature given to patient.       Asthma trigger checklist was verbally reviewed and literature given to patient.    Educated patient on the importance of utilizing supplemental oxygen when prescribed. Reviewed strategies for maximizing energy. Patient verbalized understanding. Literature given to patient.       Infection prevention was discussed. Patient was reminded to get pneumonia vaccine. Hand hygiene and cleaning of respiratory equipment was also discussed. Patient verbalized understanding.      Asthma action plan was reviewed and literature was given to patient. Patient verbalized understanding.      Plan:  Monthly  Pulmonary Disease Management Questionnaire  Follow-up PDM appointment scheduled for 2/22/23 at 10:00 AM at [facility]    Reinforce education  Meds: Advair, Spiriva, Proventil   DME Needs: [facility]   Action Plan: Asthma  Immunization: Pneumococcal- DUE, Flu-current, Covid- current   Next Provider Visit: to be determined- message sent to have rescheduled to accommodate for patient's transportation with [place] [facility]  Next Spirometry/CPFT:tbd  Approximate time spent with patient: 60 mins

## 2022-08-30 NOTE — LETTER
September 7, 2022    Crow J Nomra  [address]  [facility] [address]             Dear Mr. Green,    I am writing from the Outpatient Care Management Department at [facility].  I have been unable to reach you by phone.  Please contact me if you would like to discuss any needs.     I can be reached at [phone]  Monday thru Friday from 8:00am to 4:30pm.  [facility] also has a program with a nurse available 24/7 to answer questions or provide medical advice.  [facility] can be reached at [phone].     Sincerely,   Avni RYDER RN

## 2022-08-31 ENCOUNTER — OUTPATIENT CASE MANAGEMENT (OUTPATIENT)
Dept: ADMINISTRATIVE | Facility: OTHER | Age: 67
End: 2022-08-31
Payer: MEDICARE

## 2022-08-31 NOTE — PROGRESS NOTES
"  SW attempted to contact patient twice for follow up. Voice mailbox full. DARWIN able to verify home health was scheduled today. DARWIN able to send "SMS" message requesting call back.   "

## 2022-08-31 NOTE — LETTER
September 8, 2022    Crow FISCHER Norma  [address]  [facility] [address]             [facility] [address] [place] [address] Dear Mr. Green:     I am writing from the Outpatient Complex Care Management Department at [facility].  I have been unsuccessful at reaching you to follow up with you to see how you have been doing. I hope you find the resources previously provided to you helpful. Please contact me for further assistance.    I can be reached at [phone] Monday thru Friday from 8:00am to 4:30pm.  [facility] also has a program with a nurse available 24/7 to answer questions or provide medical advice.  [facility] can be reached at [phone].    Sincerely,       Briana Tripp LMSW

## 2022-09-01 ENCOUNTER — DOCUMENT SCAN (OUTPATIENT)
Dept: HOME HEALTH SERVICES | Facility: HOSPITAL | Age: 67
End: 2022-09-01
Payer: MEDICARE

## 2022-09-01 ENCOUNTER — TELEPHONE (OUTPATIENT)
Dept: PULMONOLOGY | Facility: CLINIC | Age: 67
End: 2022-09-01
Payer: MEDICARE

## 2022-09-01 ENCOUNTER — CARE AT HOME (OUTPATIENT)
Dept: HOME HEALTH SERVICES | Facility: CLINIC | Age: 67
End: 2022-09-01
Payer: MEDICARE

## 2022-09-01 VITALS
OXYGEN SATURATION: 93 % | HEART RATE: 81 BPM | SYSTOLIC BLOOD PRESSURE: 100 MMHG | TEMPERATURE: 98 F | DIASTOLIC BLOOD PRESSURE: 60 MMHG | RESPIRATION RATE: 18 BRPM

## 2022-09-01 DIAGNOSIS — M54.16 LUMBAR RADICULOPATHY: ICD-10-CM

## 2022-09-01 DIAGNOSIS — I50.32 CHRONIC DIASTOLIC HEART FAILURE: ICD-10-CM

## 2022-09-01 PROCEDURE — 1111F DSCHRG MED/CURRENT MED MERGE: CPT | Mod: CPTII,S$GLB,, | Performed by: NURSE PRACTITIONER

## 2022-09-01 PROCEDURE — 99350 PR HOME VISIT,ESTAB PATIENT,LEVEL IV: ICD-10-PCS | Mod: ,,, | Performed by: NURSE PRACTITIONER

## 2022-09-01 PROCEDURE — 99350 HOME/RES VST EST HIGH MDM 60: CPT | Mod: ,,, | Performed by: NURSE PRACTITIONER

## 2022-09-01 PROCEDURE — 1111F PR DISCHARGE MEDS RECONCILED W/ CURRENT OUTPATIENT MED LIST: ICD-10-PCS | Mod: CPTII,S$GLB,, | Performed by: NURSE PRACTITIONER

## 2022-09-01 RX ORDER — BACLOFEN 10 MG/1
10 TABLET ORAL DAILY
Refills: 0
Start: 2022-09-01 | End: 2022-11-09

## 2022-09-01 NOTE — TELEPHONE ENCOUNTER
----- Message from Angélica Buck sent at 9/1/2022 12:46 PM CDT -----  Contact: Crow  Type:  Sooner Apoointment Request    Caller is requesting a sooner appointment. Caller will not accept being placed on the waitlist and is requesting a message be sent to doctor.  Name of Caller:Crow  When is the first available appointment?scheduled 9/6/22  Symptoms:Sleep apnea  Would the patient rather a call back or a response via [facility]? call  Best Call Back Number: [phone]  Additional Information: Patient request to reschedule visit for 9/7/22 if possible.   Thank you,  EDDIE

## 2022-09-01 NOTE — PROGRESS NOTES
[facility]  Transition of Care Home Visit    Visit Date: 9/1/2022  Encounter Provider: Kusum Mahajan   PCP:  Eunice Mora NP    PRESENTING HISTORY      Patient ID: Crow Green is a 67 y.o. male.    Consult Requested By:  Dr. Justin Alba  Reason for Consult:  Hospital Follow Up.    Crow is being seen at home due to being seen at home due to physical debility that presents a taxing effort to leave the home, to mitigate high risk of hospital readmission and/or due to the limited availability of reliable or safe options for transportation to the point of access to the provider. Prior to treatment on this visit the chart was reviewed and patient verbal consent was obtained.      Chief Complaint: Follow-up    Patient was admitted to hospital on 07/31 and discharged to home on 08/03    History of Present Illness: Mr. Crow Green is a 67 y.o. male who was recently admitted to the hospital with a PMHx of asthma, atrial fibrillation with RVR, CHF, chronic obstructive pulmonary disease, DM, elevated PSA, HTN, MI who presents to the Emergency Department for evaluation of mid-sternal chest pain which onset gradually yesterday. Pt also c/o a cough and feeling weak. Symptoms are constant and moderate in severity. No mitigating or exacerbating factors reported. Associated sxs include weakness, cough. Patient denies any n/v/d, fever, chills, and all other sxs at this time. No prior Tx reported. No further complaints or concerns at this time.        ___________________________________________________________________    Today:    HPI:  Patient is being seen for a transitional care visit following hospitalization. See hospital course for details. Upon arrival patient was ambulatory to the door. He is AAOx3 in no acute distress. Patient voices no new concerns today. He is on 2L NC continuous and has an oxygen concentrator. VSS and patient reports compliance to all medications.  nurse fills his  pill planner for him.          Review of Systems   Constitutional:  Positive for fatigue.   HENT: Negative.     Eyes: Negative.    Respiratory:  Positive for shortness of breath.    Cardiovascular: Negative.    Gastrointestinal:  Positive for constipation and diarrhea.   Endocrine: Negative.    Genitourinary: Negative.    Musculoskeletal:  Positive for arthralgias and gait problem.   Skin: Negative.    Allergic/Immunologic: Negative.    Neurological:  Negative for weakness.   Hematological: Negative.    Psychiatric/Behavioral: Negative.       Assessments:  Environmental: Patient lives in a single story home. There are steps and a ramp at the entrance. Lighting is dim and temperature is comfortable.  Functional Status: Patient is ambulatory for short distances and is independent with ADLs  Safety: Fall Precautions  Nutritional: Adequate food in the home.   Home Health/DME/Supplies: [facility], DMEs: power chair, rollator.     PAST HISTORY:     Past Medical History:   Diagnosis Date    Arthritis     Asthma     Atrial fibrillation     Atrial fibrillation with RVR 8/7/2019    CHF (congestive heart failure)     Chronic obstructive pulmonary disease 8/5/2020    Depression     Dermatomyositis     Diabetes mellitus, type 2 Diagnosed in 2000    Elevated PSA     Follow up 7/30/2020    Hypertension     Myocardial infarction     2017    Sleep apnea        Past Surgical History:   Procedure Laterality Date    ABLATION OF ARRHYTHMOGENIC FOCUS FOR ATRIAL FIBRILLATION N/A 2/27/2020    Procedure: Ablation atrial fibrillation;  Surgeon: Gio Brown MD;  Location: [facility] EP LAB;  Service: Cardiology;  Laterality: N/A;  afib, DAMIEN (cx if SR), PVI, PITO, anes, MB, 3 Prep    CHOLECYSTECTOMY      CLOSURE OF WOUND Left 7/1/2020    Procedure: CLOSURE, WOUND;  Surgeon: Barb Veras DPM;  Location: [facility] OR;  Service: Podiatry;  Laterality: Left;    COLONOSCOPY N/A 3/4/2022    Procedure: COLONOSCOPY;  Surgeon: Yolis TATUM  MD Fortino;  Location: [facility];  Service: Endoscopy;  Laterality: N/A;    ESOPHAGOGASTRODUODENOSCOPY N/A 3/4/2022    Procedure: EGD (ESOPHAGOGASTRODUODENOSCOPY);  Surgeon: Yolis Freitas MD;  Location: [facility] ENDO;  Service: Endoscopy;  Laterality: N/A;    INJECTION OF ANESTHETIC AGENT INTO SACROILIAC JOINT Left 3/24/2021    Procedure: Left BLOCK, SACROILIAC JOINT and bilateral rhomboid TPI;  Surgeon: Dillan Tsai MD;  Location: [facility] PAIN MGT;  Service: Pain Management;  Laterality: Left;    INTRALUMINAL GASTROINTESTINAL TRACT IMAGING VIA CAPSULE N/A 3/22/2022    Procedure: IMAGING PROCEDURE, GI TRACT, INTRALUMINAL, VIA CAPSULE;  Surgeon: Justice Martinez RN;  Location: [facility];  Service: Endoscopy;  Laterality: N/A;    REVERSE TOTAL SHOULDER ARTHROPLASTY Left 1/10/2022    Procedure: ARTHROPLASTY, SHOULDER, TOTAL, REVERSE;  Surgeon: Anthony Alvarado MD;  Location: [facility] OR;  Service: Orthopedics;  Laterality: Left;  left reverse total shoulder arthroplasty     SELECTIVE INJECTION OF ANESTHETIC AGENT AROUND LUMBAR SPINAL NERVE ROOT BY TRANSFORAMINAL APPROACH Left 6/11/2020    Procedure: BLOCK, SPINAL NERVE ROOT, LUMBAR, SELECTIVE, TRANSFORAMINAL APPROACH Left L4-5, L5-S1 TFESI with RN IV sedation;  Surgeon: Dillan Tsai MD;  Location: [facility] PAIN MGT;  Service: Pain Management;  Laterality: Left;    TOE AMPUTATION Left 6/29/2020    Procedure: AMPUTATION, TOE;  Surgeon: Barb Veras DPM;  Location: [facility] OR;  Service: Podiatry;  Laterality: Left;  great toe       Family History   Problem Relation Age of Onset    Glaucoma Mother     Cataracts Mother     Diabetes Mother     Cataracts Father     Stroke Father     Glaucoma Sister         x3    Cataracts Sister         x3    Diabetes Sister         x1    Stroke Sister         x1    Glaucoma Maternal Aunt     Diabetes Maternal Aunt     No Known Problems Brother     No Known Problems Daughter     No Known Problems Son     Cancer Maternal Grandfather         lung  (smoker)    Prostate cancer Neg Hx     Heart disease Neg Hx     Kidney disease Neg Hx        Social History     Socioeconomic History    Marital status: Legally     Number of children: 5   Occupational History    Occupation: disabled    Occupation: PT at [employer]    Tobacco Use    Smoking status: Never    Smokeless tobacco: Never   Substance and Sexual Activity    Alcohol use: Yes     Comment: rare, maybe holidays    Drug use: Not Currently     Types: Cocaine    Sexual activity: Not Currently     Partners: Female   Social History Narrative    Utilizing [employer] transportation which has been unreliable.      Social Determinants of Health     Financial Resource Strain: Medium Risk    Difficulty of Paying Living Expenses: Somewhat hard   Food Insecurity: Food Insecurity Present    Worried About Running Out of Food in the Last Year: Often true    Ran Out of Food in the Last Year: Often true   Transportation Needs: Unmet Transportation Needs    Lack of Transportation (Medical): Yes    Lack of Transportation (Non-Medical): Yes   Physical Activity: Insufficiently Active    Days of Exercise per Week: 1 day    Minutes of Exercise per Session: 60 min   Stress: Stress Concern Present    Feeling of Stress : Very much   Social Connections: Moderately Isolated    Frequency of Communication with Friends and Family: More than three times a week    Frequency of Social Gatherings with Friends and Family: Once a week    Attends Synagogue Services: More than 4 times per year    Active Member of Clubs or Organizations: No    Attends Club or Organization Meetings: Never    Marital Status:    Housing Stability: Low Risk     Unable to Pay for Housing in the Last Year: No    Number of Places Lived in the Last Year: 2    Unstable Housing in the Last Year: No       MEDICATIONS & ALLERGIES:     Current Outpatient Medications on File Prior to Visit   Medication Sig Dispense Refill    acetaminophen (TYLENOL)  500 MG tablet Take 2 tablets (1,000 mg total) by mouth every 8 (eight) hours as needed for Pain. 60 tablet 0    albuterol (PROVENTIL) 2.5 mg /3 mL (0.083 %) nebulizer solution Take 3 mLs (2.5 mg total) by nebulization every 4 to 6 hours as needed for Wheezing or Shortness of Breath. 360 mL 11    allopurinoL (ZYLOPRIM) 100 MG tablet Take 1 tablet (100 mg total) by mouth once daily. 90 tablet 1    aspirin (ECOTRIN) 81 MG EC tablet Take 1 tablet (81 mg total) by mouth once daily.      atorvastatin (LIPITOR) 40 MG tablet TAKE 1 TABLET BY MOUTH EVERY EVENING 90 tablet 3    azaTHIOprine (IMURAN) 50 mg Tab Take 1 tablet (50 mg total) by mouth once daily. 90 tablet 0    blood sugar diagnostic Strp 1 each by Misc.(Non-Drug; Combo Route) route 4 (four) times daily. 300 each 3    blood-glucose meter (TRUE METRIX GLUCOSE METER) kit Use as instructed to test blood glucose meter daily. 1 each 1    ELIQUIS 5 mg Tab Take 1 tablet (5 mg total) by mouth 2 (two) times daily. 180 tablet 1    fluticasone-salmeterol diskus inhaler 250-50 mcg Inhale 1 puff into the lungs 2 (two) times daily. Controller 180 each 3    furosemide (LASIX) 40 MG tablet Take 2 tablets (80 mg total) by mouth 2 (two) times a day. 60 tablet 3    gabapentin (NEURONTIN) 800 MG tablet Take 1 tablet (800 mg total) by mouth 3 (three) times daily. 270 tablet 4    insulin aspart U-100 (NOVOLOG FLEXPEN U-100 INSULIN) 100 unit/mL (3 mL) InPn pen INJECT 10 UNITS SUBCUTANEOUSLY THREE TIMES DAILY WITH MEALS 15 mL 0    insulin degludec (TRESIBA FLEXTOUCH U-200) 200 unit/mL (3 mL) insulin pen Inject 40 Units into the skin once daily. (Patient taking differently: Inject 38 Units into the skin once daily.) 6 pen 3    ipratropium (ATROVENT) 0.02 % nebulizer solution INHALE THE CONTENTS OF 1 VIAL VIA NEBULIZER FOUR TIMES DAILY 1 Box 0    isosorbide mononitrate (IMDUR) 60 MG 24 hr tablet Take 1 tablet (60 mg total) by mouth 2 (two) times a day. 180 tablet 2    JARDIANCE 25 mg tablet  TAKE 1 TABLET(25 MG) BY MOUTH EVERY DAY 90 tablet 1    latanoprost 0.005 % ophthalmic solution INSTILL 1 DROP INTO BOTH EYES ONE TIME DAILY. BOTTLE EXPIRES 42 DAYS AFTER OPENING 3 mL 4    metoprolol succinate (TOPROL-XL) 50 MG 24 hr tablet Take 1 tablet (50 mg total) by mouth every evening. 90 tablet 3    MICRO THIN LANCETS 33 gauge Misc       OZEMPIC 1 mg/dose (2 mg/1.5 mL) PnIj Inject 1 mg into the skin every 7 days. 6 pen 1    pantoprazole (PROTONIX) 40 MG tablet Take 1 tablet (40 mg total) by mouth once daily. 90 tablet 1    ranolazine (RANEXA) 1,000 mg Tb12 Take 1 tablet (1,000 mg total) by mouth 2 (two) times daily. 60 tablet 11    sacubitriL-valsartan (ENTRESTO) 24-26 mg per tablet Take 1 tablet by mouth 2 (two) times daily. 60 tablet 0    sertraline (ZOLOFT) 100 MG tablet Take 1 tablet (100 mg total) by mouth every evening. 90 tablet 1    tamsulosin (FLOMAX) 0.4 mg Cap Take 1 capsule (0.4 mg total) by mouth once daily. 90 capsule 1    tiotropium (SPIRIVA WITH HANDIHALER) 18 mcg inhalation capsule INHALE THE CONTENTS OF 1 CAPSULE ONE TIME DAILY (CONTROLLER) 90 capsule 3    traMADoL (ULTRAM) 50 mg tablet Take 1 tablet (50 mg total) by mouth every 8 (eight) hours as needed for Pain. 15 tablet 0    traZODone (DESYREL) 100 MG tablet Take 2 tablets (200 mg total) by mouth every evening. 180 tablet 1    triamcinolone acetonide 0.1% (KENALOG) 0.1 % ointment Apply topically 2 (two) times daily. Use on legs and back 454 g 0    VIOS AEROSOL DELIVERY SYSTEM Shefali USE AS DIRESTED  0    [DISCONTINUED] amLODIPine (NORVASC) 2.5 MG tablet Take 1 tablet (2.5 mg total) by mouth every evening. 90 tablet 1    [DISCONTINUED] baclofen (LIORESAL) 10 MG tablet Take 1 tablet (10 mg total) by mouth once daily. 90 tablet 0    [DISCONTINUED] diclofenac sodium (VOLTAREN) 1 % Gel APPLY 2 GRAMS TOPICALLY FOUR TIMES DAILY 600 g 2    [DISCONTINUED] HYDROcodone-acetaminophen (NORCO) 7.5-325 mg per tablet SMARTSI Tablet(s) By Mouth Every 12  Hours PRN      [DISCONTINUED] nitroGLYCERIN (NITROSTAT) 0.4 MG SL tablet Place 1 tablet (0.4 mg total) under the tongue every 5 (five) minutes as needed for Chest pain. 50 tablet 12    [DISCONTINUED] tacrolimus (PROTOPIC) 0.1 % ointment Apply topically 2 (two) times daily. 100 g 2     No current facility-administered medications on file prior to visit.        Review of patient's allergies indicates:   Allergen Reactions    Protamine Hives     Urticaria, possible upper airway swelling       OBJECTIVE:     Vital Signs:  Vitals:    09/01/22 1105   BP: 100/60   Pulse: 81   Resp: 18   Temp: 97.5 °F (36.4 °C)     There is no height or weight on file to calculate BMI.     Physical Exam:  Physical Exam  Vitals reviewed.   Constitutional:       General: He is not in acute distress.  HENT:      Head: Normocephalic.      Mouth/Throat:      Mouth: Mucous membranes are moist.      Pharynx: Oropharynx is clear.   Eyes:      Pupils: Pupils are equal, round, and reactive to light.   Cardiovascular:      Rate and Rhythm: Normal rate and regular rhythm.      Pulses: Normal pulses.      Heart sounds: Normal heart sounds.   Pulmonary:      Breath sounds: Examination of the right-upper field reveals decreased breath sounds. Examination of the left-upper field reveals decreased breath sounds. Examination of the right-lower field reveals decreased breath sounds. Examination of the left-lower field reveals decreased breath sounds. Decreased breath sounds present.      Comments: Patient on 2L NC  Abdominal:      General: Bowel sounds are normal. There is no distension.      Palpations: Abdomen is soft.      Tenderness: There is no abdominal tenderness. There is no guarding.   Musculoskeletal:      Cervical back: Normal range of motion.      Right lower leg: No edema.      Left lower leg: No edema.   Skin:     General: Skin is warm and dry.      Capillary Refill: Capillary refill takes less than 2 seconds.   Neurological:      Mental Status:  He is alert and oriented to person, place, and time. Mental status is at baseline.      Motor: Weakness present.      Gait: Gait abnormal.   Psychiatric:         Mood and Affect: Mood normal.         Behavior: Behavior normal.         Thought Content: Thought content normal.       Laboratory  Lab Results   Component Value Date    WBC 8.54 08/09/2022    HGB 10.0 (L) 08/09/2022    HCT 32.9 (L) 08/09/2022    MCV 90 08/09/2022     (L) 08/09/2022     Lab Results   Component Value Date    INR 1.0 08/03/2021    INR 1.0 05/24/2021    INR 1.0 06/26/2020     Lab Results   Component Value Date    HGBA1C 6.0 (H) 07/31/2022     No results for input(s): POCTGLUCOSE in the last 72 hours.    Diagnostic Results:      TRANSITION OF CARE:     [facility] Contact Note:     Family and/or Caretaker present at visit?  Yes.  Diagnostic tests reviewed/disposition: No diagnosic tests pending after this hospitalization.  Disease/illness education: CHF  Home health/community services discussion/referrals: Patient has home health established at [facility] .   Establishment or re-establishment of referral orders for community resources: No other necessary community resources.   Discussion with other health care providers: No discussion with other health care providers necessary.     Transition of Care Visit:  I have reviewed and updated the history and problem list.  I have reconciled the medication list.  I have discussed the hospitalization and current medical issues, prognosis and plans with the patient/family.  I  spent more than 50% of time discussing the care with the patient/family.  Total Face-to-Face Encounter: 60 minutes.    Medications Reconciliation:   I have reconciled the patient's home medications and discharge medications with the patient/family. I have updated all changes.  Refer to After-Visit Medication List.    ASSESSMENT & PLAN:     HIGH RISK CONDITION(S):  T2DM, CKD Stage 3, CHF    1. Chronic diastolic  heart failure  Comments:  Patient reports no increased shortness of breath or chest pain  O2 sats are 93% on 2L NC  no edema noted to lower extremites      Overview:  Follow up scheduled with cardiology since he has not seen in over a year. BNP and CXR today since having crackles in LLL, and also c/o worsening SOB.     Orders:  -     Ambulatory referral/consult to [facility]  -     Ambulatory referral/consult to Family Practice    2. Lumbar radiculopathy  -     baclofen (LIORESAL) 10 MG tablet   Encounter for Medical Follow-Up and Medication Review  - [facility] at NP to schedule follow-up visit with patient in 4 weeks or PRN     Patient Instructions Given:  - Continue all medications, treatments and therapies as ordered.   - Follow all instructions, recommendations as discussed.  - Maintain Safety Precautions at all times.  - Attend all medical appointments as scheduled.  - For worsening symptoms: call Primary Care Physician or Nurse Practitioner.  - For emergencies, call 911 or immediately report to the nearest emergency room       Were controlled substances prescribed?  No    Instructions for the patient:    Scheduled Follow-up :  Future Appointments   Date Time Provider Department Center   9/7/2022  9:40 AM Osvaldo Kern MD [facility] PULMSVC [place]   9/8/2022  9:30 AM JEWELS Beltran ON[facility]   9/8/2022 10:40 AM LABORATORY, RENETTA DOSS ON[facility] LAB [facility]   9/19/2022  2:15 PM Barb Veras DPM [facility] POD [place]   9/19/2022  3:20 PM Cm Polanco MD [facility] IM [place]   9/22/2022  8:00 AM Eduardo Castillo PA-C [facility] DIABETE [place]   10/4/2022  2:45 PM PULMONARY LAB, [facility] PULMFUN [place]   10/4/2022  3:40 PM Elizabeth Lejeune, NP [facility] SLEEP [place]   10/18/2022  9:45 AM CHRIS Capps MD [facility] OPHTHAL [place]   2/22/2023 10:00 AM PULMONARY DISEASE MANAGEMENT [facility] PULMFUN [place]       After Visit Medication List  :     Medication List            Accurate as of September 1, 2022  3:57 PM. If you have any questions, ask your nurse or doctor.                CHANGE how you take these medications      insulin degludec 200 unit/mL (3 mL) insulin pen  Commonly known as: TRESIBA FLEXTOUCH U-200  Inject 40 Units into the skin once daily.  What changed: how much to take            CONTINUE taking these medications      acetaminophen 500 MG tablet  Commonly known as: TYLENOL  Take 2 tablets (1,000 mg total) by mouth every 8 (eight) hours as needed for Pain.     albuterol 2.5 mg /3 mL (0.083 %) nebulizer solution  Commonly known as: PROVENTIL  Take 3 mLs (2.5 mg total) by nebulization every 4 to 6 hours as needed for Wheezing or Shortness of Breath.     allopurinoL 100 MG tablet  Commonly known as: ZYLOPRIM  Take 1 tablet (100 mg total) by mouth once daily.     aspirin 81 MG EC tablet  Commonly known as: ECOTRIN  Take 1 tablet (81 mg total) by mouth once daily.     atorvastatin 40 MG tablet  Commonly known as: LIPITOR  TAKE 1 TABLET BY MOUTH EVERY EVENING     azaTHIOprine 50 mg Tab  Commonly known as: IMURAN  Take 1 tablet (50 mg total) by mouth once daily.     baclofen 10 MG tablet  Commonly known as: LIORESAL  Take 1 tablet (10 mg total) by mouth once daily.     blood sugar diagnostic Strp  1 each by Misc.(Non-Drug; Combo Route) route 4 (four) times daily.     blood-glucose meter kit  Commonly known as: TRUE METRIX GLUCOSE METER  Use as instructed to test blood glucose meter daily.     ELIQUIS 5 mg Tab  Generic drug: apixaban  Take 1 tablet (5 mg total) by mouth 2 (two) times daily.     ENTRESTO 24-26 mg per tablet  Generic drug: sacubitriL-valsartan  Take 1 tablet by mouth 2 (two) times daily.     fluticasone-salmeterol 250-50 mcg/dose 250-50 mcg/dose diskus inhaler  Commonly known as: ADVAIR DISKUS  Inhale 1 puff into the lungs 2 (two) times daily. Controller     furosemide 40 MG tablet  Commonly known as: LASIX  Take 2 tablets (80  mg total) by mouth 2 (two) times a day.     gabapentin 800 MG tablet  Commonly known as: NEURONTIN  Take 1 tablet (800 mg total) by mouth 3 (three) times daily.     insulin aspart U-100 100 unit/mL (3 mL) Inpn pen  Commonly known as: NovoLOG Flexpen U-100 Insulin  INJECT 10 UNITS SUBCUTANEOUSLY THREE TIMES DAILY WITH MEALS     ipratropium 0.02 % nebulizer solution  Commonly known as: ATROVENT  INHALE THE CONTENTS OF 1 VIAL VIA NEBULIZER FOUR TIMES DAILY     isosorbide mononitrate 60 MG 24 hr tablet  Commonly known as: IMDUR  Take 1 tablet (60 mg total) by mouth 2 (two) times a day.     JARDIANCE 25 mg tablet  Generic drug: empagliflozin  TAKE 1 TABLET(25 MG) BY MOUTH EVERY DAY     latanoprost 0.005 % ophthalmic solution  INSTILL 1 DROP INTO BOTH EYES ONE TIME DAILY. BOTTLE EXPIRES 42 DAYS AFTER OPENING     metoprolol succinate 50 MG 24 hr tablet  Commonly known as: TOPROL-XL  Take 1 tablet (50 mg total) by mouth every evening.     MICRO THIN LANCETS 33 gauge Misc  Generic drug: lancets     OZEMPIC 1 mg/dose (2 mg/1.5 mL) Pnij  Generic drug: semaglutide  Inject 1 mg into the skin every 7 days.     pantoprazole 40 MG tablet  Commonly known as: PROTONIX  Take 1 tablet (40 mg total) by mouth once daily.     ranolazine 1,000 mg Tb12  Commonly known as: RANEXA  Take 1 tablet (1,000 mg total) by mouth 2 (two) times daily.     sertraline 100 MG tablet  Commonly known as: ZOLOFT  Take 1 tablet (100 mg total) by mouth every evening.     SPIRIVA WITH HANDIHALER 18 mcg inhalation capsule  Generic drug: tiotropium  INHALE THE CONTENTS OF 1 CAPSULE ONE TIME DAILY (CONTROLLER)     tamsulosin 0.4 mg Cap  Commonly known as: FLOMAX  Take 1 capsule (0.4 mg total) by mouth once daily.     traMADoL 50 mg tablet  Commonly known as: ULTRAM  Take 1 tablet (50 mg total) by mouth every 8 (eight) hours as needed for Pain.     traZODone 100 MG tablet  Commonly known as: DESYREL  Take 2 tablets (200 mg total) by mouth every evening.      triamcinolone acetonide 0.1% 0.1 % ointment  Commonly known as: KENALOG  Apply topically 2 (two) times daily. Use on legs and back     VIOS AEROSOL DELIVERY SYSTEM Shefali  Generic drug: nebulizer and compressor               Where to Get Your Medications        Information about where to get these medications is not yet available    Ask your nurse or doctor about these medications  baclofen 10 MG tablet         Signature: Kusum Mahajan NP

## 2022-09-02 ENCOUNTER — DOCUMENT SCAN (OUTPATIENT)
Dept: HOME HEALTH SERVICES | Facility: HOSPITAL | Age: 67
End: 2022-09-02
Payer: MEDICARE

## 2022-09-07 ENCOUNTER — OFFICE VISIT (OUTPATIENT)
Dept: PULMONOLOGY | Facility: CLINIC | Age: 67
End: 2022-09-07
Payer: MEDICARE

## 2022-09-07 ENCOUNTER — HOSPITAL ENCOUNTER (OUTPATIENT)
Dept: RADIOLOGY | Facility: HOSPITAL | Age: 67
Discharge: HOME | End: 2022-09-07
Attending: INTERNAL MEDICINE
Payer: MEDICARE

## 2022-09-07 ENCOUNTER — PATIENT OUTREACH (OUTPATIENT)
Dept: ADMINISTRATIVE | Facility: HOSPITAL | Age: 67
End: 2022-09-07
Payer: MEDICARE

## 2022-09-07 VITALS
WEIGHT: 190.69 LBS | HEART RATE: 75 BPM | OXYGEN SATURATION: 91 % | RESPIRATION RATE: 16 BRPM | SYSTOLIC BLOOD PRESSURE: 130 MMHG | HEIGHT: 65 IN | DIASTOLIC BLOOD PRESSURE: 70 MMHG | BODY MASS INDEX: 31.77 KG/M2

## 2022-09-07 DIAGNOSIS — M33.13 DERMATOMYOSITIS: ICD-10-CM

## 2022-09-07 DIAGNOSIS — Z79.4 TYPE 2 DIABETES MELLITUS WITH MILD NONPROLIFERATIVE RETINOPATHY AND MACULAR EDEMA, WITH LONG-TERM CURRENT USE OF INSULIN, UNSPECIFIED LATERALITY: Primary | ICD-10-CM

## 2022-09-07 DIAGNOSIS — Z82.5 FAMILY HISTORY OF ASTHMA: ICD-10-CM

## 2022-09-07 DIAGNOSIS — G47.33 OSA (OBSTRUCTIVE SLEEP APNEA): ICD-10-CM

## 2022-09-07 DIAGNOSIS — D84.9 IMMUNOCOMPROMISED: ICD-10-CM

## 2022-09-07 DIAGNOSIS — Z23 NEED FOR VACCINATION FOR PNEUMOCOCCUS: Primary | ICD-10-CM

## 2022-09-07 DIAGNOSIS — I50.22 CHRONIC SYSTOLIC CONGESTIVE HEART FAILURE: ICD-10-CM

## 2022-09-07 DIAGNOSIS — E11.3219 TYPE 2 DIABETES MELLITUS WITH MILD NONPROLIFERATIVE RETINOPATHY AND MACULAR EDEMA, WITH LONG-TERM CURRENT USE OF INSULIN, UNSPECIFIED LATERALITY: Primary | ICD-10-CM

## 2022-09-07 DIAGNOSIS — Z86.14 HISTORY OF MRSA INFECTION: ICD-10-CM

## 2022-09-07 DIAGNOSIS — J45.909 ASTHMA, UNSPECIFIED ASTHMA SEVERITY, UNSPECIFIED WHETHER COMPLICATED, UNSPECIFIED WHETHER PERSISTENT: ICD-10-CM

## 2022-09-07 PROCEDURE — 71046 XR CHEST PA AND LATERAL: ICD-10-PCS | Mod: 26,,, | Performed by: RADIOLOGY

## 2022-09-07 PROCEDURE — 3075F PR MOST RECENT SYSTOLIC BLOOD PRESS GE 130-139MM HG: ICD-10-PCS | Mod: CPTII,S$GLB,, | Performed by: INTERNAL MEDICINE

## 2022-09-07 PROCEDURE — 4010F ACE/ARB THERAPY RXD/TAKEN: CPT | Mod: CPTII,S$GLB,, | Performed by: INTERNAL MEDICINE

## 2022-09-07 PROCEDURE — 3075F SYST BP GE 130 - 139MM HG: CPT | Mod: CPTII,S$GLB,, | Performed by: INTERNAL MEDICINE

## 2022-09-07 PROCEDURE — 1159F PR MEDICATION LIST DOCUMENTED IN MEDICAL RECORD: ICD-10-PCS | Mod: CPTII,S$GLB,, | Performed by: INTERNAL MEDICINE

## 2022-09-07 PROCEDURE — 3008F BODY MASS INDEX DOCD: CPT | Mod: CPTII,S$GLB,, | Performed by: INTERNAL MEDICINE

## 2022-09-07 PROCEDURE — 4010F PR ACE/ARB THEARPY RXD/TAKEN: ICD-10-PCS | Mod: CPTII,S$GLB,, | Performed by: INTERNAL MEDICINE

## 2022-09-07 PROCEDURE — G0009 PNEUMOCOCCAL CONJUGATE VACCINE 20-VALENT: ICD-10-PCS | Mod: S$GLB,,, | Performed by: INTERNAL MEDICINE

## 2022-09-07 PROCEDURE — 99999 PR PBB SHADOW E&M-EST. PATIENT-LVL V: CPT | Mod: PBBFAC,,, | Performed by: INTERNAL MEDICINE

## 2022-09-07 PROCEDURE — 1101F PT FALLS ASSESS-DOCD LE1/YR: CPT | Mod: CPTII,S$GLB,, | Performed by: INTERNAL MEDICINE

## 2022-09-07 PROCEDURE — 3078F PR MOST RECENT DIASTOLIC BLOOD PRESSURE < 80 MM HG: ICD-10-PCS | Mod: CPTII,S$GLB,, | Performed by: INTERNAL MEDICINE

## 2022-09-07 PROCEDURE — 71046 X-RAY EXAM CHEST 2 VIEWS: CPT | Mod: TC

## 2022-09-07 PROCEDURE — 99215 OFFICE O/P EST HI 40 MIN: CPT | Mod: S$GLB,,, | Performed by: INTERNAL MEDICINE

## 2022-09-07 PROCEDURE — 3078F DIAST BP <80 MM HG: CPT | Mod: CPTII,S$GLB,, | Performed by: INTERNAL MEDICINE

## 2022-09-07 PROCEDURE — 99999 PR PBB SHADOW E&M-EST. PATIENT-LVL V: ICD-10-PCS | Mod: PBBFAC,,, | Performed by: INTERNAL MEDICINE

## 2022-09-07 PROCEDURE — 99215 PR OFFICE/OUTPT VISIT, EST, LEVL V, 40-54 MIN: ICD-10-PCS | Mod: S$GLB,,, | Performed by: INTERNAL MEDICINE

## 2022-09-07 PROCEDURE — 71046 X-RAY EXAM CHEST 2 VIEWS: CPT | Mod: 26,,, | Performed by: RADIOLOGY

## 2022-09-07 PROCEDURE — 3044F PR MOST RECENT HEMOGLOBIN A1C LEVEL <7.0%: ICD-10-PCS | Mod: CPTII,S$GLB,, | Performed by: INTERNAL MEDICINE

## 2022-09-07 PROCEDURE — 1159F MED LIST DOCD IN RCRD: CPT | Mod: CPTII,S$GLB,, | Performed by: INTERNAL MEDICINE

## 2022-09-07 PROCEDURE — 99499 UNLISTED E&M SERVICE: CPT | Mod: S$GLB,,, | Performed by: INTERNAL MEDICINE

## 2022-09-07 PROCEDURE — 3288F PR FALLS RISK ASSESSMENT DOCUMENTED: ICD-10-PCS | Mod: CPTII,S$GLB,, | Performed by: INTERNAL MEDICINE

## 2022-09-07 PROCEDURE — 90677 PNEUMOCOCCAL CONJUGATE VACCINE 20-VALENT: ICD-10-PCS | Mod: S$GLB,,, | Performed by: INTERNAL MEDICINE

## 2022-09-07 PROCEDURE — 3044F HG A1C LEVEL LT 7.0%: CPT | Mod: CPTII,S$GLB,, | Performed by: INTERNAL MEDICINE

## 2022-09-07 PROCEDURE — 90677 PCV20 VACCINE IM: CPT | Mod: S$GLB,,, | Performed by: INTERNAL MEDICINE

## 2022-09-07 PROCEDURE — G0009 ADMIN PNEUMOCOCCAL VACCINE: HCPCS | Mod: S$GLB,,, | Performed by: INTERNAL MEDICINE

## 2022-09-07 PROCEDURE — 3008F PR BODY MASS INDEX (BMI) DOCUMENTED: ICD-10-PCS | Mod: CPTII,S$GLB,, | Performed by: INTERNAL MEDICINE

## 2022-09-07 PROCEDURE — 1101F PR PT FALLS ASSESS DOC 0-1 FALLS W/OUT INJ PAST YR: ICD-10-PCS | Mod: CPTII,S$GLB,, | Performed by: INTERNAL MEDICINE

## 2022-09-07 PROCEDURE — 3288F FALL RISK ASSESSMENT DOCD: CPT | Mod: CPTII,S$GLB,, | Performed by: INTERNAL MEDICINE

## 2022-09-07 NOTE — PROGRESS NOTES
Pulmonary Outpatient Follow Up Visit     Subjective:    This patient is new to me.  Previous records with colleagues including office visits, testing were personally reviewed.  Outside records under care everywhere if available that includes office visits and testing were reviewed personally as well.     Patient ID: Crow Green is a 67 y.o. male.    Chief Complaint: COPD and Sleep Apnea      HPI      67-year-old male patient presenting for hospital follow-up after admission July 2022 for MRSA pneumonia and discharged home on oxygen.      Known with severe obstructive sleep apnea, lost his CPAP machine years ago, systolic heart failure.      Does complain of intermittent wheezing coughing SOB on exertion.      Does have a son with severe asthma who is not smoker.          Review of Systems   Constitutional:  Positive for weight gain, fatigue and weakness. Negative for fever and chills.   HENT:  Negative for nosebleeds.    Eyes:  Negative for redness.   Respiratory:  Positive for apnea, snoring, cough, shortness of breath, wheezing, previous hospitalization due to pulmonary problems, asthma nighttime symptoms, dyspnea on extertion, use of rescue inhaler and somnolence. Negative for choking.    Cardiovascular:  Positive for leg swelling. Negative for chest pain.   Genitourinary:  Negative for hematuria.   Endocrine:  Negative for cold intolerance.    Musculoskeletal:  Positive for arthralgias, back pain and gait problem.   Skin:  Negative for rash.   Gastrointestinal:  Negative for vomiting.   Neurological:  Negative for syncope.   Hematological:  Negative for adenopathy.   Psychiatric/Behavioral:  Positive for sleep disturbance. Negative for confusion.      Outpatient Encounter Medications as of 9/7/2022   Medication Sig Dispense Refill    acetaminophen (TYLENOL) 500 MG tablet Take 2 tablets (1,000 mg total) by mouth every 8 (eight) hours as needed for Pain. 60 tablet  0    albuterol (PROVENTIL) 2.5 mg /3 mL (0.083 %) nebulizer solution Take 3 mLs (2.5 mg total) by nebulization every 4 to 6 hours as needed for Wheezing or Shortness of Breath. 360 mL 11    allopurinoL (ZYLOPRIM) 100 MG tablet Take 1 tablet (100 mg total) by mouth once daily. 90 tablet 1    aspirin (ECOTRIN) 81 MG EC tablet Take 1 tablet (81 mg total) by mouth once daily.      atorvastatin (LIPITOR) 40 MG tablet TAKE 1 TABLET BY MOUTH EVERY EVENING 90 tablet 3    azaTHIOprine (IMURAN) 50 mg Tab Take 1 tablet (50 mg total) by mouth once daily. 90 tablet 0    baclofen (LIORESAL) 10 MG tablet Take 1 tablet (10 mg total) by mouth once daily.  0    blood sugar diagnostic Strp 1 each by Misc.(Non-Drug; Combo Route) route 4 (four) times daily. 300 each 3    blood-glucose meter (TRUE METRIX GLUCOSE METER) kit Use as instructed to test blood glucose meter daily. 1 each 1    ELIQUIS 5 mg Tab Take 1 tablet (5 mg total) by mouth 2 (two) times daily. 180 tablet 1    fluticasone-salmeterol diskus inhaler 250-50 mcg Inhale 1 puff into the lungs 2 (two) times daily. Controller 180 each 3    furosemide (LASIX) 40 MG tablet Take 2 tablets (80 mg total) by mouth 2 (two) times a day. 60 tablet 3    gabapentin (NEURONTIN) 800 MG tablet Take 1 tablet (800 mg total) by mouth 3 (three) times daily. 270 tablet 4    insulin aspart U-100 (NOVOLOG FLEXPEN U-100 INSULIN) 100 unit/mL (3 mL) InPn pen INJECT 10 UNITS SUBCUTANEOUSLY THREE TIMES DAILY WITH MEALS 15 mL 0    insulin degludec (TRESIBA FLEXTOUCH U-200) 200 unit/mL (3 mL) insulin pen Inject 40 Units into the skin once daily. (Patient taking differently: Inject 38 Units into the skin once daily.) 6 pen 3    ipratropium (ATROVENT) 0.02 % nebulizer solution INHALE THE CONTENTS OF 1 VIAL VIA NEBULIZER FOUR TIMES DAILY 1 Box 0    isosorbide mononitrate (IMDUR) 60 MG 24 hr tablet Take 1 tablet (60 mg total) by mouth 2 (two) times a day. 180 tablet 2    JARDIANCE 25 mg tablet TAKE 1 TABLET(25  MG) BY MOUTH EVERY DAY 90 tablet 1    latanoprost 0.005 % ophthalmic solution INSTILL 1 DROP INTO BOTH EYES ONE TIME DAILY. BOTTLE EXPIRES 42 DAYS AFTER OPENING 3 mL 4    metoprolol succinate (TOPROL-XL) 50 MG 24 hr tablet Take 1 tablet (50 mg total) by mouth every evening. 90 tablet 3    MICRO THIN LANCETS 33 gauge Misc       OZEMPIC 1 mg/dose (2 mg/1.5 mL) PnIj Inject 1 mg into the skin every 7 days. 6 pen 1    pantoprazole (PROTONIX) 40 MG tablet Take 1 tablet (40 mg total) by mouth once daily. 90 tablet 1    ranolazine (RANEXA) 1,000 mg Tb12 Take 1 tablet (1,000 mg total) by mouth 2 (two) times daily. 60 tablet 11    sacubitriL-valsartan (ENTRESTO) 24-26 mg per tablet Take 1 tablet by mouth 2 (two) times daily. 60 tablet 0    sertraline (ZOLOFT) 100 MG tablet Take 1 tablet (100 mg total) by mouth every evening. 90 tablet 1    tamsulosin (FLOMAX) 0.4 mg Cap Take 1 capsule (0.4 mg total) by mouth once daily. 90 capsule 1    tiotropium (SPIRIVA WITH HANDIHALER) 18 mcg inhalation capsule INHALE THE CONTENTS OF 1 CAPSULE ONE TIME DAILY (CONTROLLER) 90 capsule 3    traMADoL (ULTRAM) 50 mg tablet Take 1 tablet (50 mg total) by mouth every 8 (eight) hours as needed for Pain. 15 tablet 0    traZODone (DESYREL) 100 MG tablet Take 2 tablets (200 mg total) by mouth every evening. 180 tablet 1    triamcinolone acetonide 0.1% (KENALOG) 0.1 % ointment Apply topically 2 (two) times daily. Use on legs and back 454 g 0    VIOS AEROSOL DELIVERY SYSTEM Shefali USE AS DIRESTED  0    [DISCONTINUED] amLODIPine (NORVASC) 2.5 MG tablet Take 1 tablet (2.5 mg total) by mouth every evening. 90 tablet 1    [DISCONTINUED] diclofenac sodium (VOLTAREN) 1 % Gel APPLY 2 GRAMS TOPICALLY FOUR TIMES DAILY 600 g 2    [DISCONTINUED] nitroGLYCERIN (NITROSTAT) 0.4 MG SL tablet Place 1 tablet (0.4 mg total) under the tongue every 5 (five) minutes as needed for Chest pain. 50 tablet 12     No facility-administered encounter medications on file as of  "9/7/2022.       Objective:     Vital Signs (Most Recent)  Vital Signs  Pulse: 75  Resp: 16  SpO2: (!) 91 %  BP: 130/70  BP Location: Left arm  Patient Position: Sitting  Pain Score:   8  Pain Loc: Shoulder  Height and Weight  Height: 5' 5" (165.1 cm)  Weight: 86.5 kg (190 lb 11.2 oz)  BSA (Calculated - sq m): 1.99 sq meters  BMI (Calculated): 31.7  Weight in (lb) to have BMI = 25: 149.9]  Wt Readings from Last 2 Encounters:   09/07/22 86.5 kg (190 lb 11.2 oz)   08/30/22 87.8 kg (193 lb 9 oz)       Physical Exam   Constitutional: He is oriented to person, place, and time. He appears well-developed and well-nourished. No distress.   HENT:   Head: Normocephalic.   Cardiovascular: Normal rate and regular rhythm.   Pulmonary/Chest: Normal expansion and effort normal. No stridor. No respiratory distress. He has decreased breath sounds. He exhibits no tenderness.   Abdominal: He exhibits no distension.   Musculoskeletal:         General: No tenderness.      Cervical back: Neck supple.   Lymphadenopathy:     He has no cervical adenopathy.   Neurological: He is alert and oriented to person, place, and time. Gait abnormal.   Skin: Skin is warm. No cyanosis. Nails show no clubbing.   Psychiatric: He has a normal mood and affect. His behavior is normal. Judgment and thought content normal.   Nursing note and vitals reviewed.    Laboratory  Lab Results   Component Value Date    WBC 8.54 08/09/2022    RBC 3.65 (L) 08/09/2022    HGB 10.0 (L) 08/09/2022    HCT 32.9 (L) 08/09/2022    MCV 90 08/09/2022    MCH 27.4 08/09/2022    MCHC 30.4 (L) 08/09/2022    RDW 17.5 (H) 08/09/2022     (L) 08/09/2022    MPV 10.8 08/09/2022    GRAN 5.2 08/09/2022    GRAN 61.0 08/09/2022    LYMPH 2.4 08/09/2022    LYMPH 28.5 08/09/2022    MONO 0.7 08/09/2022    MONO 8.3 08/09/2022    EOS 0.1 08/09/2022    BASO 0.01 08/09/2022    EOSINOPHIL 1.6 08/09/2022    BASOPHIL 0.1 08/09/2022       BMP  Lab Results   Component Value Date     08/09/2022 "    K 4.3 08/09/2022     08/09/2022    CO2 26 08/09/2022    BUN 13 08/09/2022    CREATININE 0.9 08/09/2022    CALCIUM 8.5 (L) 08/09/2022    ANIONGAP 6 (L) 08/09/2022    ESTGFRAFRICA >60 07/31/2022    EGFRNONAA >60 07/31/2022    AST 15 08/02/2022    ALT 14 08/02/2022    PROT 6.5 08/02/2022       Lab Results   Component Value Date     (H) 08/02/2022     (H) 07/30/2022     (H) 06/15/2022     (H) 03/02/2022     (H) 08/03/2021     (H) 06/17/2021       Lab Results   Component Value Date    TSH 1.529 07/27/2020       Lab Results   Component Value Date    SEDRATE 92 (H) 09/11/2020       Lab Results   Component Value Date    CRP 97.4 (H) 09/11/2020     No results found for: IGE     No results found for: ASPERGILLUS  No results found for: AFUMIGATUSCL     No results found for: ACE     Nuclear stress test August 2022             2D echo July 2022       The left ventricle is normal in size with severe concentric hypertrophy and mildly decreased systolic function.  Mild left atrial enlargement.  The estimated ejection fraction is 40 - 45%.  Grade III left ventricular diastolic dysfunction.  Normal right ventricular size with mildly reduced right ventricular systolic function.  Mild tricuspid regurgitation.  Mild pulmonic regurgitation.  Elevated central venous pressure (15 mmHg).  The estimated PA systolic pressure is 45 mmHg.  There is pulmonary hypertension.      PFT 2017 personal reviewed mild restriction and mild DLCO reduction       Diagnostic Results:  I have personally reviewed today the following studies:    Chest x-ray July 2022 left basilar opacity     Diagnostic polysomnography 2017     noted and discussed.  The diagnostic polysomnography revealed a probably severe obstructive sleep apnea / hypopnea syndrome  (A + H Index = 103.9 events / hr asleep with 87.7 arousals / hr asleep for the study, and a mean SpO2 value of 92.9 %,  moderate, minimum oxygen saturation during  sleep of 80.0 %, and waking baseline SpO2 = 96 %. There were no RERAs  (respiratory effort - related arousals). Sporadic, moderately loud snoring was noted.    Assessment/Plan:   Need for vaccination for pneumococcus  -     (In Office Administered) Pneumococcal Conjugate Vaccine (20 Valent) (IM)    SANDRITA (obstructive sleep apnea)  -     Ambulatory referral/consult to Pulmonology  -     CPAP Titration (Must have dx of SANDRITA from previous sleep study.); Future    Asthma, unspecified asthma severity, unspecified whether complicated, unspecified whether persistent  -     Stress test, pulmonary; Future  -     Complete PFT with bronchodilator; Future; Expected date: 09/21/2022  -     IgE; Future; Expected date: 09/07/2022  -     X-Ray Chest PA And Lateral; Future; Expected date: 09/07/2022    Chronic systolic congestive heart failure  -     CPAP Titration (Must have dx of SANDRITA from previous sleep study.); Future    Family history of asthma  -     Stress test, pulmonary; Future    History of MRSA infection  -     X-Ray Chest PA And Lateral; Future; Expected date: 09/07/2022    Dermatomyositis    Immunocompromised    Check 6 minute walking test.      Check PFT with bronchodilator.  Check IgE    Continue albuterol p.r.n..      Continue Advair 250 microgram 1 puff twice daily    CPAP titration.  Patient lost his machine previously and would like to resume CPAP therapy.      Follow-up with cardiology.      Continue Imuran follow-up with rheumatology.    Follow up in about 3 months (around 12/7/2022).    This note was prepared using voice recognition system and is likely to have sound alike errors that may have been overlooked even after proof reading.  Please call me with any questions    Discussed diagnosis, its evaluation, treatment and usual course. All questions answered.      Osvaldo Kern MD

## 2022-09-07 NOTE — PROGRESS NOTES
Working Diabetes.    Pt has lab appt scheduled 9/08/22.  Micro albumin urine ordered and linked to appt.

## 2022-09-08 ENCOUNTER — TELEPHONE (OUTPATIENT)
Dept: INTERNAL MEDICINE | Facility: CLINIC | Age: 67
End: 2022-09-08
Payer: MEDICARE

## 2022-09-08 ENCOUNTER — OFFICE VISIT (OUTPATIENT)
Dept: CARDIOLOGY | Facility: CLINIC | Age: 67
End: 2022-09-08
Payer: MEDICARE

## 2022-09-08 VITALS
SYSTOLIC BLOOD PRESSURE: 116 MMHG | BODY MASS INDEX: 31.55 KG/M2 | DIASTOLIC BLOOD PRESSURE: 70 MMHG | HEIGHT: 65 IN | HEART RATE: 80 BPM | WEIGHT: 189.38 LBS

## 2022-09-08 DIAGNOSIS — R76.8 ELEVATED IGE LEVEL: Primary | ICD-10-CM

## 2022-09-08 DIAGNOSIS — I50.43 ACUTE ON CHRONIC COMBINED SYSTOLIC AND DIASTOLIC HEART FAILURE: Primary | ICD-10-CM

## 2022-09-08 DIAGNOSIS — I48.0 PAF (PAROXYSMAL ATRIAL FIBRILLATION): Chronic | ICD-10-CM

## 2022-09-08 DIAGNOSIS — I50.32 CHRONIC DIASTOLIC HEART FAILURE: ICD-10-CM

## 2022-09-08 DIAGNOSIS — I25.118 CORONARY ARTERY DISEASE OF NATIVE ARTERY OF NATIVE HEART WITH STABLE ANGINA PECTORIS: Chronic | ICD-10-CM

## 2022-09-08 PROCEDURE — 99999 PR PBB SHADOW E&M-EST. PATIENT-LVL II: CPT | Mod: PBBFAC,,, | Performed by: PHYSICIAN ASSISTANT

## 2022-09-08 PROCEDURE — 3074F PR MOST RECENT SYSTOLIC BLOOD PRESSURE < 130 MM HG: ICD-10-PCS | Mod: CPTII,S$GLB,, | Performed by: PHYSICIAN ASSISTANT

## 2022-09-08 PROCEDURE — 3078F DIAST BP <80 MM HG: CPT | Mod: CPTII,S$GLB,, | Performed by: PHYSICIAN ASSISTANT

## 2022-09-08 PROCEDURE — 99213 PR OFFICE/OUTPT VISIT, EST, LEVL III, 20-29 MIN: ICD-10-PCS | Mod: S$GLB,,, | Performed by: PHYSICIAN ASSISTANT

## 2022-09-08 PROCEDURE — 4010F ACE/ARB THERAPY RXD/TAKEN: CPT | Mod: CPTII,S$GLB,, | Performed by: PHYSICIAN ASSISTANT

## 2022-09-08 PROCEDURE — 99999 PR PBB SHADOW E&M-EST. PATIENT-LVL II: ICD-10-PCS | Mod: PBBFAC,,, | Performed by: PHYSICIAN ASSISTANT

## 2022-09-08 PROCEDURE — 99213 OFFICE O/P EST LOW 20 MIN: CPT | Mod: S$GLB,,, | Performed by: PHYSICIAN ASSISTANT

## 2022-09-08 PROCEDURE — 4010F PR ACE/ARB THEARPY RXD/TAKEN: ICD-10-PCS | Mod: CPTII,S$GLB,, | Performed by: PHYSICIAN ASSISTANT

## 2022-09-08 PROCEDURE — 3008F PR BODY MASS INDEX (BMI) DOCUMENTED: ICD-10-PCS | Mod: CPTII,S$GLB,, | Performed by: PHYSICIAN ASSISTANT

## 2022-09-08 PROCEDURE — 3078F PR MOST RECENT DIASTOLIC BLOOD PRESSURE < 80 MM HG: ICD-10-PCS | Mod: CPTII,S$GLB,, | Performed by: PHYSICIAN ASSISTANT

## 2022-09-08 PROCEDURE — 3008F BODY MASS INDEX DOCD: CPT | Mod: CPTII,S$GLB,, | Performed by: PHYSICIAN ASSISTANT

## 2022-09-08 PROCEDURE — 3074F SYST BP LT 130 MM HG: CPT | Mod: CPTII,S$GLB,, | Performed by: PHYSICIAN ASSISTANT

## 2022-09-08 PROCEDURE — 3044F HG A1C LEVEL LT 7.0%: CPT | Mod: CPTII,S$GLB,, | Performed by: PHYSICIAN ASSISTANT

## 2022-09-08 PROCEDURE — 3044F PR MOST RECENT HEMOGLOBIN A1C LEVEL <7.0%: ICD-10-PCS | Mod: CPTII,S$GLB,, | Performed by: PHYSICIAN ASSISTANT

## 2022-09-08 NOTE — PROGRESS NOTES
HF [facility] Provider Note (Follow-up) Consult Note      HPI:  Patient is in motorized scooter   Patient sleeps on 2 pillows   Patient wakes up SOB, has to get out of bed, associated cough, sputum denies   Palpitations - none   Dizzy, light-headed, pre-syncope or syncope none   Since discharge frequency of performing weights, home weight and weight change   Other information felt pertinent to HPI    Since last visit continues to do well. No LE edema. Continuing on lasix BID. Weight stable. In motorized scooter so does not ambulate much.      PHYSICAL: There were no vitals filed for this visit.   Wt Readings from Last 3 Encounters:   09/07/22 86.5 kg (190 lb 11.2 oz)   08/30/22 87.8 kg (193 lb 9 oz)   08/03/22 89.8 kg (198 lb)       JVD: no   Heart rhythm: regular  Cardiac murmur: No    S3: no  S4: no  Lungs: clear  Liver span: 10 cm:   Hepatojugular reflux: no  Edema: no      ASSESSMENT: acute on chronic systolic HF    PLAN:      Patient Instructions:   Instruct the patient to notify this clinic if HH, a physician or an advanced care provider wants to change medication one of their HF medications   Activity and Diet restrictions:   Recommend 2-3 gram sodium restriction and 1500cc- 2000cc fluid restriction.  Encourage physical activity with graded exercise program.  Requested patient to weigh themselves daily, and to notify us if their weight increases by more than 3 lbs in 1 day or 5 lbs in 3 days.    Assigned dry weight on home scale: 86 kg    Medication changes (include current dose and changed dose)  Continue lasix BID  Educated on cardiomems device for reduction in readmsissions for HF. Interested in scheduling. Has had many ER visits for HF this year.  Upcoming labs and date anticipated: RTC 2 weeks with consent     Stage C HFpEF/HFrEF with NYHA class III symptoms, elevated BNP and or recent HF admission. They are at a high risk for HF readmissions. Patient has been compliant with their HF regimen and on good GDMT.    I have discussed with them the risks and benefits of the CardioMEMS device. This device has been shown to decrease HF readmissions by 50%.   Will start the preauth process.     Recommend 2 gram sodium restriction and 1500cc fluid restriction.   Encourage physical activity with graded exercise program.   Requested patient to weigh themselves daily, and to notify us if their weight increases by more than 3 lbs in 1 day or 5 lbs in 1 week.   Thank you for allowing us to participate in the cardiovascular management of this patient. We look forward to partnering in their care. Please contact us with any questions or concerns you may have regarding this patient.

## 2022-09-08 NOTE — PROGRESS NOTES
DARWIN attempted to contact patient for the second time for follow up. Unable to leave message due to voice mailbox full. SW sent letter to patient regarding attempt and requesting call back.

## 2022-09-13 ENCOUNTER — TELEPHONE (OUTPATIENT)
Dept: CARDIOLOGY | Facility: CLINIC | Age: 67
End: 2022-09-13
Payer: MEDICARE

## 2022-09-13 NOTE — TELEPHONE ENCOUNTER
Telephoned patient to advise of rescheduling CardioMems implant from 10/3 to 10/10 for 930am.  Patient has Heart Failure appointment on 9/21 to sign consent and get instructions.

## 2022-09-14 ENCOUNTER — TELEPHONE (OUTPATIENT)
Dept: ALLERGY | Facility: CLINIC | Age: 67
End: 2022-09-14
Payer: MEDICARE

## 2022-09-14 ENCOUNTER — DOCUMENT SCAN (OUTPATIENT)
Dept: HOME HEALTH SERVICES | Facility: HOSPITAL | Age: 67
End: 2022-09-14
Payer: MEDICARE

## 2022-09-14 NOTE — TELEPHONE ENCOUNTER
----- Message from Debbie Escobar RD, CDE sent at 9/14/2022  8:25 AM CDT -----  Contact: Pt    ----- Message -----  From: Nathen Mujica  Sent: 9/14/2022   8:18 AM CDT  To: , #    Pt states he's calling to get a later time for his appt due to transport reasons/ can come in after 10 for all appt/ pt can be reached at [phone]//thanks/dbw

## 2022-09-14 NOTE — TELEPHONE ENCOUNTER
Spoke with pt and states he will be able to make today's appt at 10am. He's not sure why we got that message.

## 2022-09-14 NOTE — TELEPHONE ENCOUNTER
Spoke with patient, he will be about 30-45 mins late today, Jane said she would work him in once he gets here. Pt stated. Thank  you, and will get here as soon as his transportation gets here.
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
## 2022-09-15 ENCOUNTER — TELEPHONE (OUTPATIENT)
Dept: INTERNAL MEDICINE | Facility: CLINIC | Age: 67
End: 2022-09-15
Payer: MEDICARE

## 2022-09-15 ENCOUNTER — TELEPHONE (OUTPATIENT)
Dept: ALLERGY | Facility: CLINIC | Age: 67
End: 2022-09-15
Payer: MEDICARE

## 2022-09-15 DIAGNOSIS — I50.42 CHRONIC COMBINED SYSTOLIC AND DIASTOLIC HEART FAILURE: ICD-10-CM

## 2022-09-15 DIAGNOSIS — R26.81 GAIT INSTABILITY: ICD-10-CM

## 2022-09-15 DIAGNOSIS — J44.9 CHRONIC OBSTRUCTIVE PULMONARY DISEASE, UNSPECIFIED COPD TYPE: Primary | ICD-10-CM

## 2022-09-15 NOTE — TELEPHONE ENCOUNTER
----- Message from Briana Tripp LMSW sent at 9/15/2022  9:46 AM CDT -----  Regarding: DME request for patient  Hi!  I am the  with Outpatient Case Management. I am following this patient and spoke with him this morning. He is needing a new hospital bed mattress and rollator. [facility] I see that an order for a new mattress was placed in January, but I do not see the aftermath of that.   Thanks so much!
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
## 2022-09-15 NOTE — TELEPHONE ENCOUNTER
----- Message from Jeanie Mayen sent at 9/15/2022  7:41 AM CDT -----  Contact: Crow  Patient is calling to reschedule appointment that he missed on 9/14. Please give him a call to schedule at [phone]      Thanks   JW

## 2022-09-15 NOTE — PROGRESS NOTES
Subjective:   Patient: Crow Green [id number], :1955   Visit date:2022 2:39 PM    Chief Complaint:  Asthma    HPI:    Prior notes reviewed by myself.  Clinical documentation obtained by nursing staff reviewed.       HPI:     24 - 66 yo M referred by pulm for an evaluation of elevated IgE:    Hx of copd and asthma ; advair and albuterol  Hx of SANDRITA - CPAP  Hx of CHF - follows with cardiologist    Elevated IgE (1367)  Never tested for allergies, has not received AIT  Ssx - rhinorrhea, nasal congestion, sneezing, watery eyes; intermittent,, year round  Meds - otc allergy eye drops; no anti-histamines    No food allergies    Environmental History:  Environmental Hx: Pets in the home: none. Does not smoke.       Past Medical History:   Diagnosis Date    Arthritis     Asthma     Atrial fibrillation     Atrial fibrillation with RVR 2019    CHF (congestive heart failure)     Chronic obstructive pulmonary disease 2020    Depression     Dermatomyositis     Diabetes mellitus, type 2 Diagnosed in     Elevated PSA     Follow up 2020    Hypertension     Myocardial infarction     2017    Sleep apnea         Family History   Problem Relation Age of Onset    Glaucoma Mother     Cataracts Mother     Diabetes Mother     Cataracts Father     Stroke Father     Glaucoma Sister         x3    Cataracts Sister         x3    Diabetes Sister         x1    Stroke Sister         x1    Glaucoma Maternal Aunt     Diabetes Maternal Aunt     No Known Problems Brother     No Known Problems Daughter     No Known Problems Son     Cancer Maternal Grandfather         lung (smoker)    Prostate cancer Neg Hx     Heart disease Neg Hx     Kidney disease Neg Hx        Social History     Socioeconomic History    Marital status: Legally     Number of children: 5   Occupational History    Occupation: disabled    Occupation: PT at [employer]    Tobacco Use    Smoking status: Never    Smokeless  tobacco: Never   Substance and Sexual Activity    Alcohol use: Yes     Comment: rare, maybe holidays    Drug use: Not Currently     Types: Cocaine    Sexual activity: Not Currently     Partners: Female   Social History Narrative    Utilizing [employer] transportation which has been unreliable.        Review of patient's allergies indicates:   Allergen Reactions    Protamine Hives     Urticaria, possible upper airway swelling           Medication List with Changes/Refills   Current Medications    ACETAMINOPHEN (TYLENOL) 500 MG TABLET    Take 2 tablets (1,000 mg total) by mouth every 8 (eight) hours as needed for Pain.    ALBUTEROL (PROVENTIL) 2.5 MG /3 ML (0.083 %) NEBULIZER SOLUTION    Take 3 mLs (2.5 mg total) by nebulization every 4 to 6 hours as needed for Wheezing or Shortness of Breath.    ALLOPURINOL (ZYLOPRIM) 100 MG TABLET    Take 1 tablet (100 mg total) by mouth once daily.    ASPIRIN (ECOTRIN) 81 MG EC TABLET    Take 1 tablet (81 mg total) by mouth once daily.    ATORVASTATIN (LIPITOR) 40 MG TABLET    TAKE 1 TABLET BY MOUTH EVERY EVENING    AZATHIOPRINE (IMURAN) 50 MG TAB    Take 1 tablet (50 mg total) by mouth once daily.    BACLOFEN (LIORESAL) 10 MG TABLET    Take 1 tablet (10 mg total) by mouth once daily.    BLOOD SUGAR DIAGNOSTIC STRP    1 each by Misc.(Non-Drug; Combo Route) route 4 (four) times daily.    BLOOD-GLUCOSE METER (TRUE METRIX GLUCOSE METER) KIT    Use as instructed to test blood glucose meter daily.    ELIQUIS 5 MG TAB    Take 1 tablet (5 mg total) by mouth 2 (two) times daily.    FLUTICASONE-SALMETEROL DISKUS INHALER 250-50 MCG    Inhale 1 puff into the lungs 2 (two) times daily. Controller    FUROSEMIDE (LASIX) 40 MG TABLET    Take 2 tablets (80 mg total) by mouth 2 (two) times a day.    GABAPENTIN (NEURONTIN) 800 MG TABLET    Take 1 tablet (800 mg total) by mouth 3 (three) times daily.    INSULIN ASPART U-100 (NOVOLOG FLEXPEN U-100 INSULIN) 100 UNIT/ML (3 ML) INPN PEN    INJECT 10 UNITS  "SUBCUTANEOUSLY THREE TIMES DAILY WITH MEALS    INSULIN DEGLUDEC (TRESIBA FLEXTOUCH U-200) 200 UNIT/ML (3 ML) INSULIN PEN    Inject 40 Units into the skin once daily.    IPRATROPIUM (ATROVENT) 0.02 % NEBULIZER SOLUTION    INHALE THE CONTENTS OF 1 VIAL VIA NEBULIZER FOUR TIMES DAILY    ISOSORBIDE MONONITRATE (IMDUR) 60 MG 24 HR TABLET    Take 1 tablet (60 mg total) by mouth 2 (two) times a day.    JARDIANCE 25 MG TABLET    TAKE 1 TABLET(25 MG) BY MOUTH EVERY DAY    LATANOPROST 0.005 % OPHTHALMIC SOLUTION    INSTILL 1 DROP INTO BOTH EYES ONE TIME DAILY. BOTTLE EXPIRES 42 DAYS AFTER OPENING    METOPROLOL SUCCINATE (TOPROL-XL) 50 MG 24 HR TABLET    Take 1 tablet (50 mg total) by mouth every evening.    MICRO THIN LANCETS 33 GAUGE MISC        OZEMPIC 1 MG/DOSE (2 MG/1.5 ML) PNIJ    Inject 1 mg into the skin every 7 days.    PANTOPRAZOLE (PROTONIX) 40 MG TABLET    Take 1 tablet (40 mg total) by mouth once daily.    RANOLAZINE (RANEXA) 1,000 MG TB12    Take 1 tablet (1,000 mg total) by mouth 2 (two) times daily.    SACUBITRIL-VALSARTAN (ENTRESTO) 24-26 MG PER TABLET    Take 1 tablet by mouth 2 (two) times daily.    SERTRALINE (ZOLOFT) 100 MG TABLET    Take 1 tablet (100 mg total) by mouth every evening.    TAMSULOSIN (FLOMAX) 0.4 MG CAP    Take 1 capsule (0.4 mg total) by mouth once daily.    TIOTROPIUM (SPIRIVA WITH HANDIHALER) 18 MCG INHALATION CAPSULE    INHALE THE CONTENTS OF 1 CAPSULE ONE TIME DAILY (CONTROLLER)    TRAMADOL (ULTRAM) 50 MG TABLET    Take 1 tablet (50 mg total) by mouth every 8 (eight) hours as needed for Pain.    TRAZODONE (DESYREL) 100 MG TABLET    Take 2 tablets (200 mg total) by mouth every evening.    TRIAMCINOLONE ACETONIDE 0.1% (KENALOG) 0.1 % OINTMENT    Apply topically 2 (two) times daily. Use on legs and back    Pruffi AEROSOL DELIVERY SYSTEM VIN    USE AS DIRESTED         Objective:     Physical Exam:  Vitals:  /77   Pulse 78   Temp 97.7 °F (36.5 °C) (Temporal)   Ht 5' 5" (1.651 m)   " Wt 85.9 kg (189 lb 6 oz)   BMI 31.51 kg/m²   Constitutional:       General: No acute distress. Alert, well developed.   HENT:      Head: Normocephalic, no lesions.      Right Ear: Pinna and external ear appears normal. There is no impacted cerumen. TM clear, intact, wnl. EAC WNL.      Left Ear: Pinna and external ear appears normal. There is no impacted cerumen. TM clear, intact, wnl. EAC WNL.     Nose: No mass or lesion. Clear mucus. BIT's WNL.     Mouth/Throat: No oropharyngeal exudate or posterior oropharyngeal erythema.   Eyes:      General: No scleral icterus.Sclera white, extraocular movements intact.        Right eye: No discharge.         Left eye: No discharge.   Neck: Supple, no palpable nodes, no masses, trachea midline, no thyromegaly.   Cardiovascular:      Rate and Rhythm: Normal rate and regular rhythm.      Heart sounds: Normal heart sounds. No murmur heard.   Pulmonary:      Effort: Pulmonary effort is normal. No respiratory distress.      Breath sounds: Normal breath sounds. No stridor. No rhonchi, or rales. No crackles or wheezing.   Musculoskeletal:         General: No swelling, tenderness, deformity or signs of injury.    Skin:     General: Skin is warm.      Coloration: Skin is not jaundiced or pale.      Findings: No bruising, erythema, or rash.   Neurological:      Alert. Moves all extremities spontaneously  Psychiatric:         Mood is normal. Behavior is normal.          Allergy Skin Prick testing:  Histamine: 8x8 / 10x10  Saline: 3x3  The patient had an appropriate response to the histamine and saline.  The following were the size of the saline and negative except for those items listed in bold:  Cat, Dog, Dust mite(F and Pt 10x10), Cockroach, Alternaria, Aspergillus, Helminthosporium, [place]  [place], Elm, [place], Ligustrum, Oak, Pecan, [place], [place], [place], [place], [place], Red [place]/Agrostis [place], Rye/Lolium, [place], English Plantain, Marsh Elder, Pigweed, Ragweed,  Russian Thistle, Curvularia/Drechsiera Spicifera, Epicoccum, Fusarium, Hormodendrum/Cladosporium, Penicillium 7x7, Monilia/candida, Phoma, Stemphylium  Total: 38        Assessment & Plan:   Allergic rhinitis due to dust mite    Elevated IgE level  -     Ambulatory referral/consult to Allergy    Skin prick allergy testing + dust mite (pt) (only), home measures on avoiding allergen reviewed and given to pt in clinic.   Advised trial with xyzal and flonase     Thank you for allowing me to participate in the care of Crow.        Jane Vazquez PA-C  [facility]  [facility]  [address]  [place] [address]  P: [phone]  F: [phone]      Total time spent in the care of this patient    New  3  [] 30-44 min  4  [x] 45-59 min  5  [] 60-74 min    Established  3  [] 20-29 min  4  [] 30-39 min  5  [] 40-54 min      [x] preparing to see the patient (eg, review of tests)  [x] obtaining and/or reviewing separately obtained history  [x] performing a medically appropriate examination and/or evaluation  [x] counseling and educating the patient/family/caregiver  [x] ordering medications, tests, or procedures  [] referring and communicating with other health care professionals (when not separately reported)  [x] documenting clinical information in the electronic or other health record  [x] independently interpreting results (not separately reported) and communicating results to the  patient/ family/caregiver  [] care coordination (not separately reported)

## 2022-09-20 ENCOUNTER — TELEPHONE (OUTPATIENT)
Dept: INTERNAL MEDICINE | Facility: CLINIC | Age: 67
End: 2022-09-20
Payer: MEDICARE

## 2022-09-20 ENCOUNTER — OFFICE VISIT (OUTPATIENT)
Dept: ALLERGY | Facility: CLINIC | Age: 67
End: 2022-09-20
Payer: MEDICARE

## 2022-09-20 ENCOUNTER — TELEPHONE (OUTPATIENT)
Dept: DIABETES | Facility: CLINIC | Age: 67
End: 2022-09-20
Payer: MEDICARE

## 2022-09-20 VITALS
TEMPERATURE: 98 F | HEIGHT: 65 IN | HEART RATE: 78 BPM | SYSTOLIC BLOOD PRESSURE: 114 MMHG | DIASTOLIC BLOOD PRESSURE: 77 MMHG | BODY MASS INDEX: 31.55 KG/M2 | WEIGHT: 189.38 LBS

## 2022-09-20 DIAGNOSIS — J30.89 ALLERGIC RHINITIS DUE TO DUST MITE: Primary | ICD-10-CM

## 2022-09-20 DIAGNOSIS — R76.8 ELEVATED IGE LEVEL: ICD-10-CM

## 2022-09-20 PROCEDURE — 1159F PR MEDICATION LIST DOCUMENTED IN MEDICAL RECORD: ICD-10-PCS | Mod: CPTII,S$GLB,, | Performed by: PHYSICIAN ASSISTANT

## 2022-09-20 PROCEDURE — 99999 PR PBB SHADOW E&M-EST. PATIENT-LVL V: CPT | Mod: PBBFAC,,, | Performed by: PHYSICIAN ASSISTANT

## 2022-09-20 PROCEDURE — 1101F PR PT FALLS ASSESS DOC 0-1 FALLS W/OUT INJ PAST YR: ICD-10-PCS | Mod: CPTII,S$GLB,, | Performed by: PHYSICIAN ASSISTANT

## 2022-09-20 PROCEDURE — 3044F HG A1C LEVEL LT 7.0%: CPT | Mod: CPTII,S$GLB,, | Performed by: PHYSICIAN ASSISTANT

## 2022-09-20 PROCEDURE — 99499 RISK ADDL DX/OHS AUDIT: ICD-10-PCS | Mod: HCNC,S$GLB,, | Performed by: PHYSICIAN ASSISTANT

## 2022-09-20 PROCEDURE — 3008F PR BODY MASS INDEX (BMI) DOCUMENTED: ICD-10-PCS | Mod: CPTII,S$GLB,, | Performed by: PHYSICIAN ASSISTANT

## 2022-09-20 PROCEDURE — 3008F BODY MASS INDEX DOCD: CPT | Mod: CPTII,S$GLB,, | Performed by: PHYSICIAN ASSISTANT

## 2022-09-20 PROCEDURE — 3288F PR FALLS RISK ASSESSMENT DOCUMENTED: ICD-10-PCS | Mod: CPTII,S$GLB,, | Performed by: PHYSICIAN ASSISTANT

## 2022-09-20 PROCEDURE — 99999 PR PBB SHADOW E&M-EST. PATIENT-LVL V: ICD-10-PCS | Mod: PBBFAC,,, | Performed by: PHYSICIAN ASSISTANT

## 2022-09-20 PROCEDURE — 3044F PR MOST RECENT HEMOGLOBIN A1C LEVEL <7.0%: ICD-10-PCS | Mod: CPTII,S$GLB,, | Performed by: PHYSICIAN ASSISTANT

## 2022-09-20 PROCEDURE — 95004 PERQ TESTS W/ALRGNC XTRCS: CPT | Mod: S$GLB,,, | Performed by: PHYSICIAN ASSISTANT

## 2022-09-20 PROCEDURE — 1101F PT FALLS ASSESS-DOCD LE1/YR: CPT | Mod: CPTII,S$GLB,, | Performed by: PHYSICIAN ASSISTANT

## 2022-09-20 PROCEDURE — 3074F PR MOST RECENT SYSTOLIC BLOOD PRESSURE < 130 MM HG: ICD-10-PCS | Mod: CPTII,S$GLB,, | Performed by: PHYSICIAN ASSISTANT

## 2022-09-20 PROCEDURE — 1159F MED LIST DOCD IN RCRD: CPT | Mod: CPTII,S$GLB,, | Performed by: PHYSICIAN ASSISTANT

## 2022-09-20 PROCEDURE — 99204 OFFICE O/P NEW MOD 45 MIN: CPT | Mod: 25,S$GLB,, | Performed by: PHYSICIAN ASSISTANT

## 2022-09-20 PROCEDURE — 99499 UNLISTED E&M SERVICE: CPT | Mod: HCNC,S$GLB,, | Performed by: PHYSICIAN ASSISTANT

## 2022-09-20 PROCEDURE — 3074F SYST BP LT 130 MM HG: CPT | Mod: CPTII,S$GLB,, | Performed by: PHYSICIAN ASSISTANT

## 2022-09-20 PROCEDURE — 4010F ACE/ARB THERAPY RXD/TAKEN: CPT | Mod: CPTII,S$GLB,, | Performed by: PHYSICIAN ASSISTANT

## 2022-09-20 PROCEDURE — 95004 PR ALLERGY SKIN TESTS,ALLERGENS: ICD-10-PCS | Mod: S$GLB,,, | Performed by: PHYSICIAN ASSISTANT

## 2022-09-20 PROCEDURE — 3078F PR MOST RECENT DIASTOLIC BLOOD PRESSURE < 80 MM HG: ICD-10-PCS | Mod: CPTII,S$GLB,, | Performed by: PHYSICIAN ASSISTANT

## 2022-09-20 PROCEDURE — 4010F PR ACE/ARB THEARPY RXD/TAKEN: ICD-10-PCS | Mod: CPTII,S$GLB,, | Performed by: PHYSICIAN ASSISTANT

## 2022-09-20 PROCEDURE — 99204 PR OFFICE/OUTPT VISIT, NEW, LEVL IV, 45-59 MIN: ICD-10-PCS | Mod: 25,S$GLB,, | Performed by: PHYSICIAN ASSISTANT

## 2022-09-20 PROCEDURE — 3078F DIAST BP <80 MM HG: CPT | Mod: CPTII,S$GLB,, | Performed by: PHYSICIAN ASSISTANT

## 2022-09-20 PROCEDURE — 3288F FALL RISK ASSESSMENT DOCD: CPT | Mod: CPTII,S$GLB,, | Performed by: PHYSICIAN ASSISTANT

## 2022-09-20 RX ORDER — LEVOCETIRIZINE DIHYDROCHLORIDE 5 MG/1
5 TABLET, FILM COATED ORAL NIGHTLY
Qty: 30 TABLET | Refills: 11 | Status: SHIPPED | OUTPATIENT
Start: 2022-09-20 | End: 2022-10-24

## 2022-09-20 RX ORDER — FLUTICASONE PROPIONATE 50 MCG
2 SPRAY, SUSPENSION (ML) NASAL 2 TIMES DAILY
Qty: 9.9 ML | Refills: 11 | Status: SHIPPED | OUTPATIENT
Start: 2022-09-20 | End: 2022-10-24

## 2022-09-20 NOTE — TELEPHONE ENCOUNTER
----- Message from Yue Abbott sent at 9/19/2022  9:27 AM CDT -----  Contact: [phone]  Pt is calling in regards to scheduling transportation for his 9/26 appt. Please call pt back at [phone]. Thanks KB

## 2022-09-20 NOTE — TELEPHONE ENCOUNTER
Called pt and rescheduled time for appointment ----- Message from Jeanei Mayen sent at 9/20/2022 10:30 AM CDT -----  Contact: Crow Wright stated that he will not be able to make to 8am appt on Thursday due to transportation services. He is requesting a later time or different date. Please call him back at [phone].          Thanks  DD

## 2022-09-21 NOTE — PROGRESS NOTES
PCP: Eunice Mora NP    Subjective:     Chief Complaint: Diabetes - Established Patient    HISTORY OF PRESENT ILLNESS: 67 y.o.   male presenting for diabetes management visit.   The patient's last visit with me was on 1/5/2022.  Patient has issues with transportation.   Patient has had Type II diabetes since 2000.  Pertinent to decision making is the following comorbidities: HTN, HLD, CAD, CHF, CKD III, Obesity by BMI, ED and Parathryoid disorder and Hypogonadism   Patient has the following Diabetes complications: with diabetic chronic kidney disease, with diabetic polyneuropathy and with diabetic retinopathy; mild non-proliferative; without macular edema  He has attended diabetes education in the past.     Patient's most recent A1c of 6.7% was completed 1 weeks ago.   Patient states since His last A1c His blood glucose levels have been within range throughout the day .   Patient monitors blood glucose 4 times per day and Continuously with personal CGM Dexcom.   Patient blood glucose monitoring device will be uploaded into [employer] today.     Patient records show baseline euglycemia with some postprandial hyperglycemia and some hypoglycemia with patient giving 40 units of novolog for corretion outside of medical advice.   Patient endorses the following diabetes related symptoms:  None .   Patient is due today for the following diabetes-related health maintenance standards: Urine Microalbumin/creatinine ratio, Influenza Vaccine, and COVID-19 Vaccine .   He denies recent hospital admissions or emergency room visits.   He denies having hypoglycemia.  Patient's concerns today include glycemic control.   Patient medication regimen is as below.     CURRENT DM MEDICATIONS:   Tresiba 35 units at night - not taking  Novolog 40 units if BG > 250 - outside of medical advice  Jardiance 25 mg daily  Ozempic 1 mg weekly - 10 pens at home    Novolog Correction Dosing every 3 hours as needed OUTSIDE OF  EATING  If  - 250, may take 2 units of Novolog  If  - 300, may take 3 units of Novolog  If  - 350, may take 4 units of Novolog  If  - 400, may take 5 units of Novolog  If +, may take 6 units of Novolog      Patient has failed the following Diabetes medications:   Bydureon  Metformin - GI  Glimepiride      Labs Reviewed.       Lab Results   Component Value Date    CPEPTIDE 4.37 07/20/2017     Lab Results   Component Value Date    GLUTAMICACID 0.01 07/20/2017          //  Weight: 83.5 kg (184 lb 1.4 oz), Body mass index is 30.63 kg/m².  His blood sugar in clinic today is:        Review of Systems   Constitutional:  Negative for activity change, appetite change, chills and fever.   HENT:  Negative for dental problem, mouth sores, nosebleeds, sore throat and trouble swallowing.    Eyes:  Negative for pain and discharge.   Respiratory:  Negative for shortness of breath, wheezing and stridor.    Cardiovascular:  Negative for chest pain, palpitations and leg swelling.   Gastrointestinal:  Negative for abdominal pain, diarrhea, nausea and vomiting.   Endocrine: Negative for polydipsia, polyphagia and polyuria.   Genitourinary:  Negative for dysuria, frequency and urgency.   Musculoskeletal:  Negative for joint swelling and myalgias.   Skin:  Negative for rash and wound.   Neurological:  Negative for dizziness, syncope, weakness and headaches.   Psychiatric/Behavioral:  Negative for behavioral problems and dysphoric mood.        Diabetes Management Status  Statin: Taking  ACE/ARB: Taking    Screening or Prevention Patient's value Goal Complete/Controlled?   HgA1C Testing and Control   Lab Results   Component Value Date    HGBA1C 6.0 (H) 07/31/2022      Annually/Less than 8% Yes   Lipid profile : 06/10/2022 Annually Yes   LDL control Lab Results   Component Value Date    LDLCALC 88.8 06/10/2022    Annually/Less than 100 mg/dl  Yes   Nephropathy screening Lab Results   Component Value Date     MICALBCREAT 421.6 (H) 07/08/2021     Lab Results   Component Value Date    PROTEINUA 2+ (A) 07/30/2022    Annually No   Blood pressure BP Readings from Last 1 Encounters:   09/22/22 110/72    Less than 140/90 Yes   Dilated retinal exam : 01/11/2022 Annually Yes    Foot exam   : 06/09/2022 Annually Yes     Social History     Socioeconomic History    Marital status: Legally     Number of children: 5   Occupational History    Occupation: disabled    Occupation: PT at [employer]    Tobacco Use    Smoking status: Never    Smokeless tobacco: Never   Substance and Sexual Activity    Alcohol use: Yes     Comment: rare, maybe holidays    Drug use: Not Currently     Types: Cocaine    Sexual activity: Not Currently     Partners: Female   Social History Narrative    Utilizing [employer] transportation which has been unreliable.      Past Medical History:   Diagnosis Date    Arthritis     Asthma     Atrial fibrillation     Atrial fibrillation with RVR 8/7/2019    CHF (congestive heart failure)     Chronic obstructive pulmonary disease 8/5/2020    Depression     Dermatomyositis     Diabetes mellitus, type 2 Diagnosed in 2000    Elevated PSA     Follow up 7/30/2020    Hypertension     Myocardial infarction     2017    Sleep apnea        Objective:        Physical Exam  Constitutional:       General: He is not in acute distress.     Appearance: He is well-developed. He is not diaphoretic.   HENT:      Head: Normocephalic and atraumatic.      Right Ear: External ear normal.      Left Ear: External ear normal.      Nose: Nose normal.   Eyes:      General:         Right eye: No discharge.         Left eye: No discharge.      Pupils: Pupils are equal, round, and reactive to light.   Cardiovascular:      Rate and Rhythm: Normal rate and regular rhythm.      Heart sounds: Normal heart sounds.   Pulmonary:      Effort: Pulmonary effort is normal.      Breath sounds: Normal breath sounds.   Abdominal:      Palpations:  Abdomen is soft.   Musculoskeletal:         General: Normal range of motion.      Cervical back: Normal range of motion and neck supple.   Skin:     General: Skin is warm and dry.      Capillary Refill: Capillary refill takes less than 2 seconds.   Neurological:      Mental Status: He is alert and oriented to person, place, and time. Mental status is at baseline.      Motor: No abnormal muscle tone.      Coordination: Coordination normal.   Psychiatric:         Mood and Affect: Mood normal.         Behavior: Behavior normal.         Thought Content: Thought content normal.         Judgment: Judgment normal.         Assessment / Plan:     Uncontrolled type 2 diabetes mellitus with hyperglycemia  -     POCT Glucose, Hand-Held Device  -     semaglutide (OZEMPIC) 2 mg/dose (8 mg/3 mL) PnIj; Inject 2 mg into the skin every 7 days.  Dispense: 9 mL; Refill: 3  -     Microalbumin/Creatinine Ratio, Urine; Future; Expected date: 09/22/2022  -     Hemoglobin A1C; Standing    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 associated with type 2 diabetes mellitus    Diabetic polyneuropathy associated with type 2 diabetes mellitus    Erectile dysfunction, unspecified erectile dysfunction type    Chronic diastolic heart failure    Coronary artery disease of native artery of native heart with stable angina pectoris    Hypertension associated with diabetes    Hyperlipidemia associated with type 2 diabetes mellitus    Disorder of parathyroid gland    Hypogonadism in male    Obesity (BMI 30.0-34.9)      Additional Plan Details:    - POCT Glucose  - Encouraged continuation of lifestyle changes including regular exercise and limiting carbohydrates to 30-45 grams per meal threes times daily and 15 grams per snack with a limit of two daily.   - Encouraged continued monitoring of blood glucose with maintenance of 4 times daily and Continuously with personal CGM Dexcom.    - Current DM Medication Regimen: Stop  Tresiba.  Change Novolog correction dosing every 3 hours as below.  Change Ozempic 2 mg weekly - will take 2 doses of 1 mg to finish supply. Continue Jardiance 25 mg daily.   - Health Maintenance standards addressed today: Urine Microalbumin / Creatinine Ratio scheduled, Flu Shot - to be scheduled today within [facility], and COVID - 19 Vaccine - patient will schedule outside of [facility]   - Nursing Visit: Patient is below goal range for nursing visit for age group and  Will need blood sugar log review on Monday .   - Follow up in 4 months with A1c prior an call 6-8 weeks.    Novolog Correction Dosing every 3 hours as needed OUTSIDE OF EATING  If  - 250, may take 2 units of Novolog  If  - 300, may take 4 units of Novolog  If  - 350, may take 6 units of Novolog  If  - 400, may take 8 units of Novolog  If +, may take 10 units of Novolog - SERGIO Castillo PA-C  [facility]    A total of 30 minutes was spent in face to face time, of which over 50 % was spent in counseling patient on disease process, complications, treatment, and side effects of medications.

## 2022-09-22 ENCOUNTER — OFFICE VISIT (OUTPATIENT)
Dept: DIABETES | Facility: CLINIC | Age: 67
End: 2022-09-22
Payer: MEDICARE

## 2022-09-22 VITALS
HEART RATE: 97 BPM | DIASTOLIC BLOOD PRESSURE: 72 MMHG | BODY MASS INDEX: 30.63 KG/M2 | SYSTOLIC BLOOD PRESSURE: 110 MMHG | WEIGHT: 184.06 LBS

## 2022-09-22 DIAGNOSIS — I25.118 CORONARY ARTERY DISEASE OF NATIVE ARTERY OF NATIVE HEART WITH STABLE ANGINA PECTORIS: ICD-10-CM

## 2022-09-22 DIAGNOSIS — E11.65 UNCONTROLLED TYPE 2 DIABETES MELLITUS WITH HYPERGLYCEMIA: Primary | ICD-10-CM

## 2022-09-22 DIAGNOSIS — E11.3312 MODERATE NONPROLIFERATIVE DIABETIC RETINOPATHY OF LEFT EYE WITH MACULAR EDEMA ASSOCIATED WITH TYPE 2 DIABETES MELLITUS: ICD-10-CM

## 2022-09-22 DIAGNOSIS — I50.32 CHRONIC DIASTOLIC HEART FAILURE: ICD-10-CM

## 2022-09-22 DIAGNOSIS — E78.5 HYPERLIPIDEMIA ASSOCIATED WITH TYPE 2 DIABETES MELLITUS: ICD-10-CM

## 2022-09-22 DIAGNOSIS — N52.9 ERECTILE DYSFUNCTION, UNSPECIFIED ERECTILE DYSFUNCTION TYPE: ICD-10-CM

## 2022-09-22 DIAGNOSIS — E11.69 HYPERLIPIDEMIA ASSOCIATED WITH TYPE 2 DIABETES MELLITUS: ICD-10-CM

## 2022-09-22 DIAGNOSIS — E11.59 HYPERTENSION ASSOCIATED WITH DIABETES: ICD-10-CM

## 2022-09-22 DIAGNOSIS — E21.5 DISORDER OF PARATHYROID GLAND: ICD-10-CM

## 2022-09-22 DIAGNOSIS — E29.1 HYPOGONADISM IN MALE: ICD-10-CM

## 2022-09-22 DIAGNOSIS — E66.9 OBESITY (BMI 30.0-34.9): ICD-10-CM

## 2022-09-22 DIAGNOSIS — I15.2 HYPERTENSION ASSOCIATED WITH DIABETES: ICD-10-CM

## 2022-09-22 DIAGNOSIS — E11.42 DIABETIC POLYNEUROPATHY ASSOCIATED WITH TYPE 2 DIABETES MELLITUS: ICD-10-CM

## 2022-09-22 LAB — GLUCOSE SERPL-MCNC: 110 MG/DL (ref 70–110)

## 2022-09-22 PROCEDURE — 4010F ACE/ARB THERAPY RXD/TAKEN: CPT | Mod: CPTII,S$GLB,, | Performed by: PHYSICIAN ASSISTANT

## 2022-09-22 PROCEDURE — 99999 PR PBB SHADOW E&M-EST. PATIENT-LVL V: ICD-10-PCS | Mod: PBBFAC,,, | Performed by: PHYSICIAN ASSISTANT

## 2022-09-22 PROCEDURE — 1159F PR MEDICATION LIST DOCUMENTED IN MEDICAL RECORD: ICD-10-PCS | Mod: CPTII,S$GLB,, | Performed by: PHYSICIAN ASSISTANT

## 2022-09-22 PROCEDURE — 3078F DIAST BP <80 MM HG: CPT | Mod: CPTII,S$GLB,, | Performed by: PHYSICIAN ASSISTANT

## 2022-09-22 PROCEDURE — 99499 UNLISTED E&M SERVICE: CPT | Mod: HCNC,S$GLB,, | Performed by: PHYSICIAN ASSISTANT

## 2022-09-22 PROCEDURE — 1101F PR PT FALLS ASSESS DOC 0-1 FALLS W/OUT INJ PAST YR: ICD-10-PCS | Mod: CPTII,S$GLB,, | Performed by: PHYSICIAN ASSISTANT

## 2022-09-22 PROCEDURE — 3044F HG A1C LEVEL LT 7.0%: CPT | Mod: CPTII,S$GLB,, | Performed by: PHYSICIAN ASSISTANT

## 2022-09-22 PROCEDURE — 3078F PR MOST RECENT DIASTOLIC BLOOD PRESSURE < 80 MM HG: ICD-10-PCS | Mod: CPTII,S$GLB,, | Performed by: PHYSICIAN ASSISTANT

## 2022-09-22 PROCEDURE — 3008F BODY MASS INDEX DOCD: CPT | Mod: CPTII,S$GLB,, | Performed by: PHYSICIAN ASSISTANT

## 2022-09-22 PROCEDURE — 82962 POCT GLUCOSE, HAND-HELD DEVICE: ICD-10-PCS | Mod: S$GLB,,, | Performed by: PHYSICIAN ASSISTANT

## 2022-09-22 PROCEDURE — 99214 OFFICE O/P EST MOD 30 MIN: CPT | Mod: S$GLB,,, | Performed by: PHYSICIAN ASSISTANT

## 2022-09-22 PROCEDURE — 4010F PR ACE/ARB THEARPY RXD/TAKEN: ICD-10-PCS | Mod: CPTII,S$GLB,, | Performed by: PHYSICIAN ASSISTANT

## 2022-09-22 PROCEDURE — 3074F SYST BP LT 130 MM HG: CPT | Mod: CPTII,S$GLB,, | Performed by: PHYSICIAN ASSISTANT

## 2022-09-22 PROCEDURE — 99214 PR OFFICE/OUTPT VISIT, EST, LEVL IV, 30-39 MIN: ICD-10-PCS | Mod: S$GLB,,, | Performed by: PHYSICIAN ASSISTANT

## 2022-09-22 PROCEDURE — 3288F PR FALLS RISK ASSESSMENT DOCUMENTED: ICD-10-PCS | Mod: CPTII,S$GLB,, | Performed by: PHYSICIAN ASSISTANT

## 2022-09-22 PROCEDURE — 3044F PR MOST RECENT HEMOGLOBIN A1C LEVEL <7.0%: ICD-10-PCS | Mod: CPTII,S$GLB,, | Performed by: PHYSICIAN ASSISTANT

## 2022-09-22 PROCEDURE — 3074F PR MOST RECENT SYSTOLIC BLOOD PRESSURE < 130 MM HG: ICD-10-PCS | Mod: CPTII,S$GLB,, | Performed by: PHYSICIAN ASSISTANT

## 2022-09-22 PROCEDURE — 95251 CONT GLUC MNTR ANALYSIS I&R: CPT | Mod: S$GLB,,, | Performed by: PHYSICIAN ASSISTANT

## 2022-09-22 PROCEDURE — 99499 RISK ADDL DX/OHS AUDIT: ICD-10-PCS | Mod: HCNC,S$GLB,, | Performed by: PHYSICIAN ASSISTANT

## 2022-09-22 PROCEDURE — 3288F FALL RISK ASSESSMENT DOCD: CPT | Mod: CPTII,S$GLB,, | Performed by: PHYSICIAN ASSISTANT

## 2022-09-22 PROCEDURE — 1101F PT FALLS ASSESS-DOCD LE1/YR: CPT | Mod: CPTII,S$GLB,, | Performed by: PHYSICIAN ASSISTANT

## 2022-09-22 PROCEDURE — 95251 PR GLUCOSE MONITOR, 72 HOUR, PHYS INTERP: ICD-10-PCS | Mod: S$GLB,,, | Performed by: PHYSICIAN ASSISTANT

## 2022-09-22 PROCEDURE — 99999 PR PBB SHADOW E&M-EST. PATIENT-LVL V: CPT | Mod: PBBFAC,,, | Performed by: PHYSICIAN ASSISTANT

## 2022-09-22 PROCEDURE — 82962 GLUCOSE BLOOD TEST: CPT | Mod: S$GLB,,, | Performed by: PHYSICIAN ASSISTANT

## 2022-09-22 PROCEDURE — 3008F PR BODY MASS INDEX (BMI) DOCUMENTED: ICD-10-PCS | Mod: CPTII,S$GLB,, | Performed by: PHYSICIAN ASSISTANT

## 2022-09-22 PROCEDURE — 1159F MED LIST DOCD IN RCRD: CPT | Mod: CPTII,S$GLB,, | Performed by: PHYSICIAN ASSISTANT

## 2022-09-22 RX ORDER — SEMAGLUTIDE 2.68 MG/ML
2 INJECTION, SOLUTION SUBCUTANEOUS
Qty: 9 ML | Refills: 3
Start: 2022-09-22 | End: 2023-09-29 | Stop reason: SDUPTHER

## 2022-09-22 NOTE — PATIENT INSTRUCTIONS
CURRENT DM MEDICATIONS:   Stop Tresiba   Novolog correction dosing every 3 hours as below for blood sugars > 200  Jardiance 25 mg daily  Ozempic 1 mg weekly - take 2 doses of 1 mg per week at same time until you run out; when close to running call me    Novolog Correction Dosing every 3 hours as needed OUTSIDE OF EATING  If  - 250, may take 2 units of Novolog  If  - 300, may take 4 units of Novolog  If  - 350, may take 6 units of Novolog  If  - 400, may take 8 units of Novolog  If +, may take 10 units of Novolog - MAX

## 2022-09-23 ENCOUNTER — HOSPITAL ENCOUNTER (OUTPATIENT)
Facility: HOSPITAL | Age: 67
Discharge: HOME HEALTH | End: 2022-09-25
Attending: FAMILY MEDICINE | Admitting: INTERNAL MEDICINE
Payer: MEDICARE

## 2022-09-23 DIAGNOSIS — I50.42 CHRONIC COMBINED SYSTOLIC AND DIASTOLIC CONGESTIVE HEART FAILURE: ICD-10-CM

## 2022-09-23 DIAGNOSIS — R07.9 PAIN IN THE CHEST: ICD-10-CM

## 2022-09-23 DIAGNOSIS — R07.9 CHEST PAIN: Primary | ICD-10-CM

## 2022-09-23 LAB
ALBUMIN SERPL BCP-MCNC: 3.2 G/DL (ref 3.5–5.2)
ALP SERPL-CCNC: 79 U/L (ref 55–135)
ALT SERPL W/O P-5'-P-CCNC: 12 U/L (ref 10–44)
ANION GAP SERPL CALC-SCNC: 14 MMOL/L (ref 8–16)
AST SERPL-CCNC: 25 U/L (ref 10–40)
BASOPHILS # BLD AUTO: 0.01 K/UL (ref 0–0.2)
BASOPHILS NFR BLD: 0.1 % (ref 0–1.9)
BILIRUB SERPL-MCNC: 0.4 MG/DL (ref 0.1–1)
BNP SERPL-MCNC: 107 PG/ML (ref 0–99)
BUN SERPL-MCNC: 21 MG/DL (ref 8–23)
CALCIUM SERPL-MCNC: 8 MG/DL (ref 8.7–10.5)
CHLORIDE SERPL-SCNC: 102 MMOL/L (ref 95–110)
CO2 SERPL-SCNC: 24 MMOL/L (ref 23–29)
CREAT SERPL-MCNC: 2 MG/DL (ref 0.5–1.4)
DIFFERENTIAL METHOD: ABNORMAL
EOSINOPHIL # BLD AUTO: 0.1 K/UL (ref 0–0.5)
EOSINOPHIL NFR BLD: 1.2 % (ref 0–8)
ERYTHROCYTE [DISTWIDTH] IN BLOOD BY AUTOMATED COUNT: 15.5 % (ref 11.5–14.5)
EST. GFR  (NO RACE VARIABLE): 36 ML/MIN/1.73 M^2
GLUCOSE SERPL-MCNC: 109 MG/DL (ref 70–110)
HCT VFR BLD AUTO: 39.9 % (ref 40–54)
HCV AB SERPL QL IA: NEGATIVE
HEP C VIRUS HOLD SPECIMEN: NORMAL
HGB BLD-MCNC: 12.5 G/DL (ref 14–18)
HIV 1+2 AB+HIV1 P24 AG SERPL QL IA: NEGATIVE
IMM GRANULOCYTES # BLD AUTO: 0.01 K/UL (ref 0–0.04)
IMM GRANULOCYTES NFR BLD AUTO: 0.1 % (ref 0–0.5)
INR PPP: 1 (ref 0.8–1.2)
LYMPHOCYTES # BLD AUTO: 2.2 K/UL (ref 1–4.8)
LYMPHOCYTES NFR BLD: 29.2 % (ref 18–48)
MCH RBC QN AUTO: 26.5 PG (ref 27–31)
MCHC RBC AUTO-ENTMCNC: 31.3 G/DL (ref 32–36)
MCV RBC AUTO: 85 FL (ref 82–98)
MONOCYTES # BLD AUTO: 1 K/UL (ref 0.3–1)
MONOCYTES NFR BLD: 12.6 % (ref 4–15)
NEUTROPHILS # BLD AUTO: 4.3 K/UL (ref 1.8–7.7)
NEUTROPHILS NFR BLD: 56.8 % (ref 38–73)
NRBC BLD-RTO: 0 /100 WBC
PLATELET # BLD AUTO: 242 K/UL (ref 150–450)
PMV BLD AUTO: 10.6 FL (ref 9.2–12.9)
POTASSIUM SERPL-SCNC: 3.1 MMOL/L (ref 3.5–5.1)
PROT SERPL-MCNC: 7 G/DL (ref 6–8.4)
PROTHROMBIN TIME: 11 SEC (ref 9–12.5)
RBC # BLD AUTO: 4.72 M/UL (ref 4.6–6.2)
SODIUM SERPL-SCNC: 140 MMOL/L (ref 136–145)
TROPONIN I SERPL DL<=0.01 NG/ML-MCNC: 0.09 NG/ML (ref 0–0.03)
WBC # BLD AUTO: 7.56 K/UL (ref 3.9–12.7)

## 2022-09-23 PROCEDURE — 99285 EMERGENCY DEPT VISIT HI MDM: CPT | Mod: 25

## 2022-09-23 PROCEDURE — 93010 EKG 12-LEAD: ICD-10-PCS | Mod: ,,, | Performed by: STUDENT IN AN ORGANIZED HEALTH CARE EDUCATION/TRAINING PROGRAM

## 2022-09-23 PROCEDURE — 93010 ELECTROCARDIOGRAM REPORT: CPT | Mod: ,,, | Performed by: STUDENT IN AN ORGANIZED HEALTH CARE EDUCATION/TRAINING PROGRAM

## 2022-09-23 PROCEDURE — 25000003 PHARM REV CODE 250: Performed by: FAMILY MEDICINE

## 2022-09-23 PROCEDURE — 85610 PROTHROMBIN TIME: CPT | Performed by: FAMILY MEDICINE

## 2022-09-23 PROCEDURE — 86803 HEPATITIS C AB TEST: CPT | Performed by: EMERGENCY MEDICINE

## 2022-09-23 PROCEDURE — 87389 HIV-1 AG W/HIV-1&-2 AB AG IA: CPT | Performed by: EMERGENCY MEDICINE

## 2022-09-23 PROCEDURE — 63600175 PHARM REV CODE 636 W HCPCS: Performed by: FAMILY MEDICINE

## 2022-09-23 PROCEDURE — 93005 ELECTROCARDIOGRAM TRACING: CPT

## 2022-09-23 PROCEDURE — 80053 COMPREHEN METABOLIC PANEL: CPT | Performed by: FAMILY MEDICINE

## 2022-09-23 PROCEDURE — 85025 COMPLETE CBC W/AUTO DIFF WBC: CPT | Performed by: FAMILY MEDICINE

## 2022-09-23 PROCEDURE — 96374 THER/PROPH/DIAG INJ IV PUSH: CPT

## 2022-09-23 PROCEDURE — 83880 ASSAY OF NATRIURETIC PEPTIDE: CPT | Performed by: FAMILY MEDICINE

## 2022-09-23 PROCEDURE — 84484 ASSAY OF TROPONIN QUANT: CPT | Performed by: FAMILY MEDICINE

## 2022-09-23 RX ORDER — KETOROLAC TROMETHAMINE 30 MG/ML
15 INJECTION, SOLUTION INTRAMUSCULAR; INTRAVENOUS
Status: COMPLETED | OUTPATIENT
Start: 2022-09-23 | End: 2022-09-23

## 2022-09-23 RX ORDER — SODIUM CHLORIDE 9 MG/ML
1000 INJECTION, SOLUTION INTRAVENOUS
Status: COMPLETED | OUTPATIENT
Start: 2022-09-23 | End: 2022-09-23

## 2022-09-23 RX ADMIN — KETOROLAC TROMETHAMINE 15 MG: 30 INJECTION, SOLUTION INTRAMUSCULAR; INTRAVENOUS at 09:09

## 2022-09-23 RX ADMIN — SODIUM CHLORIDE 1000 ML: 0.9 INJECTION, SOLUTION INTRAVENOUS at 08:09

## 2022-09-23 NOTE — Clinical Note
Diagnosis: Chest pain [830412]   Future Attending Provider: ASIA SANTIAGO [[id number]]   Admitting Provider:: ASIA SANTIAGO [[id number]]

## 2022-09-24 PROBLEM — N17.9 ACUTE RENAL FAILURE SUPERIMPOSED ON STAGE 3 CHRONIC KIDNEY DISEASE: Status: ACTIVE | Noted: 2022-06-28

## 2022-09-24 PROBLEM — I65.23 BILATERAL CAROTID ARTERY STENOSIS: Status: ACTIVE | Noted: 2022-09-24

## 2022-09-24 PROBLEM — F19.10 SUBSTANCE ABUSE: Status: ACTIVE | Noted: 2022-09-24

## 2022-09-24 LAB
ALBUMIN SERPL BCP-MCNC: 3.2 G/DL (ref 3.5–5.2)
ALP SERPL-CCNC: 77 U/L (ref 55–135)
ALT SERPL W/O P-5'-P-CCNC: 16 U/L (ref 10–44)
AMPHET+METHAMPHET UR QL: NEGATIVE
ANION GAP SERPL CALC-SCNC: 12 MMOL/L (ref 8–16)
AST SERPL-CCNC: 22 U/L (ref 10–40)
BARBITURATES UR QL SCN>200 NG/ML: NEGATIVE
BASOPHILS # BLD AUTO: 0.01 K/UL (ref 0–0.2)
BASOPHILS NFR BLD: 0.2 % (ref 0–1.9)
BENZODIAZ UR QL SCN>200 NG/ML: NEGATIVE
BILIRUB SERPL-MCNC: 0.3 MG/DL (ref 0.1–1)
BUN SERPL-MCNC: 22 MG/DL (ref 8–23)
BZE UR QL SCN: ABNORMAL
CALCIUM SERPL-MCNC: 8.2 MG/DL (ref 8.7–10.5)
CANNABINOIDS UR QL SCN: NEGATIVE
CHLORIDE SERPL-SCNC: 103 MMOL/L (ref 95–110)
CO2 SERPL-SCNC: 25 MMOL/L (ref 23–29)
CREAT SERPL-MCNC: 1.6 MG/DL (ref 0.5–1.4)
CREAT UR-MCNC: 128.8 MG/DL (ref 23–375)
DIFFERENTIAL METHOD: ABNORMAL
EOSINOPHIL # BLD AUTO: 0.1 K/UL (ref 0–0.5)
EOSINOPHIL NFR BLD: 1.4 % (ref 0–8)
ERYTHROCYTE [DISTWIDTH] IN BLOOD BY AUTOMATED COUNT: 15.6 % (ref 11.5–14.5)
EST. GFR  (NO RACE VARIABLE): 48 ML/MIN/1.73 M^2
GLUCOSE SERPL-MCNC: 107 MG/DL (ref 70–110)
HCT VFR BLD AUTO: 38.6 % (ref 40–54)
HGB BLD-MCNC: 11.9 G/DL (ref 14–18)
IMM GRANULOCYTES # BLD AUTO: 0.02 K/UL (ref 0–0.04)
IMM GRANULOCYTES NFR BLD AUTO: 0.3 % (ref 0–0.5)
LYMPHOCYTES # BLD AUTO: 1.9 K/UL (ref 1–4.8)
LYMPHOCYTES NFR BLD: 32.9 % (ref 18–48)
MCH RBC QN AUTO: 26.3 PG (ref 27–31)
MCHC RBC AUTO-ENTMCNC: 30.8 G/DL (ref 32–36)
MCV RBC AUTO: 85 FL (ref 82–98)
METHADONE UR QL SCN>300 NG/ML: NEGATIVE
MONOCYTES # BLD AUTO: 0.5 K/UL (ref 0.3–1)
MONOCYTES NFR BLD: 7.8 % (ref 4–15)
NEUTROPHILS # BLD AUTO: 3.4 K/UL (ref 1.8–7.7)
NEUTROPHILS NFR BLD: 57.4 % (ref 38–73)
NRBC BLD-RTO: 0 /100 WBC
OPIATES UR QL SCN: NEGATIVE
PCP UR QL SCN>25 NG/ML: NEGATIVE
PLATELET # BLD AUTO: 208 K/UL (ref 150–450)
PMV BLD AUTO: 11.2 FL (ref 9.2–12.9)
POCT GLUCOSE: 150 MG/DL (ref 70–110)
POCT GLUCOSE: 153 MG/DL (ref 70–110)
POCT GLUCOSE: 161 MG/DL (ref 70–110)
POCT GLUCOSE: 94 MG/DL (ref 70–110)
POCT GLUCOSE: 99 MG/DL (ref 70–110)
POTASSIUM SERPL-SCNC: 3.6 MMOL/L (ref 3.5–5.1)
PROT SERPL-MCNC: 7 G/DL (ref 6–8.4)
RBC # BLD AUTO: 4.53 M/UL (ref 4.6–6.2)
SODIUM SERPL-SCNC: 140 MMOL/L (ref 136–145)
TOXICOLOGY INFORMATION: ABNORMAL
TROPONIN I SERPL DL<=0.01 NG/ML-MCNC: 0.08 NG/ML (ref 0–0.03)
TROPONIN I SERPL DL<=0.01 NG/ML-MCNC: 0.09 NG/ML (ref 0–0.03)
TROPONIN I SERPL DL<=0.01 NG/ML-MCNC: 0.09 NG/ML (ref 0–0.03)
TROPONIN I SERPL DL<=0.01 NG/ML-MCNC: 0.1 NG/ML (ref 0–0.03)
WBC # BLD AUTO: 5.86 K/UL (ref 3.9–12.7)

## 2022-09-24 PROCEDURE — 94640 AIRWAY INHALATION TREATMENT: CPT | Mod: XB

## 2022-09-24 PROCEDURE — 85025 COMPLETE CBC W/AUTO DIFF WBC: CPT | Performed by: NURSE PRACTITIONER

## 2022-09-24 PROCEDURE — 80307 DRUG TEST PRSMV CHEM ANLYZR: CPT | Performed by: NURSE PRACTITIONER

## 2022-09-24 PROCEDURE — 27000221 HC OXYGEN, UP TO 24 HOURS

## 2022-09-24 PROCEDURE — 84484 ASSAY OF TROPONIN QUANT: CPT | Performed by: FAMILY MEDICINE

## 2022-09-24 PROCEDURE — 99219 PR INITIAL OBSERVATION CARE,LEVL II: ICD-10-PCS | Mod: ,,, | Performed by: STUDENT IN AN ORGANIZED HEALTH CARE EDUCATION/TRAINING PROGRAM

## 2022-09-24 PROCEDURE — 99219 PR INITIAL OBSERVATION CARE,LEVL II: CPT | Mod: ,,, | Performed by: STUDENT IN AN ORGANIZED HEALTH CARE EDUCATION/TRAINING PROGRAM

## 2022-09-24 PROCEDURE — 36415 COLL VENOUS BLD VENIPUNCTURE: CPT | Performed by: NURSE PRACTITIONER

## 2022-09-24 PROCEDURE — 84484 ASSAY OF TROPONIN QUANT: CPT | Mod: 91 | Performed by: NURSE PRACTITIONER

## 2022-09-24 PROCEDURE — 25000003 PHARM REV CODE 250: Performed by: INTERNAL MEDICINE

## 2022-09-24 PROCEDURE — 80053 COMPREHEN METABOLIC PANEL: CPT | Performed by: NURSE PRACTITIONER

## 2022-09-24 PROCEDURE — 99900035 HC TECH TIME PER 15 MIN (STAT)

## 2022-09-24 PROCEDURE — 25000003 PHARM REV CODE 250: Performed by: NURSE PRACTITIONER

## 2022-09-24 PROCEDURE — 94761 N-INVAS EAR/PLS OXIMETRY MLT: CPT

## 2022-09-24 PROCEDURE — G0378 HOSPITAL OBSERVATION PER HR: HCPCS

## 2022-09-24 PROCEDURE — 25000242 PHARM REV CODE 250 ALT 637 W/ HCPCS: Performed by: INTERNAL MEDICINE

## 2022-09-24 PROCEDURE — 25000003 PHARM REV CODE 250: Performed by: STUDENT IN AN ORGANIZED HEALTH CARE EDUCATION/TRAINING PROGRAM

## 2022-09-24 RX ORDER — BUDESONIDE 0.5 MG/2ML
0.5 INHALANT ORAL EVERY 12 HOURS
Status: DISCONTINUED | OUTPATIENT
Start: 2022-09-24 | End: 2022-09-26 | Stop reason: HOSPADM

## 2022-09-24 RX ORDER — POLYETHYLENE GLYCOL 3350 17 G/17G
17 POWDER, FOR SOLUTION ORAL 2 TIMES DAILY PRN
Status: DISCONTINUED | OUTPATIENT
Start: 2022-09-24 | End: 2022-09-26 | Stop reason: HOSPADM

## 2022-09-24 RX ORDER — SODIUM,POTASSIUM PHOSPHATES 280-250MG
2 POWDER IN PACKET (EA) ORAL
Status: DISCONTINUED | OUTPATIENT
Start: 2022-09-24 | End: 2022-09-26 | Stop reason: HOSPADM

## 2022-09-24 RX ORDER — AZATHIOPRINE 50 MG/1
50 TABLET ORAL DAILY
Status: DISCONTINUED | OUTPATIENT
Start: 2022-09-24 | End: 2022-09-24

## 2022-09-24 RX ORDER — GABAPENTIN 100 MG/1
100 CAPSULE ORAL 3 TIMES DAILY
Refills: 4 | Status: DISCONTINUED | OUTPATIENT
Start: 2022-09-24 | End: 2022-09-26 | Stop reason: HOSPADM

## 2022-09-24 RX ORDER — PANTOPRAZOLE SODIUM 40 MG/1
40 TABLET, DELAYED RELEASE ORAL DAILY
Status: DISCONTINUED | OUTPATIENT
Start: 2022-09-24 | End: 2022-09-26 | Stop reason: HOSPADM

## 2022-09-24 RX ORDER — NITROGLYCERIN 20 MG/1
1 PATCH TRANSDERMAL DAILY
Status: DISCONTINUED | OUTPATIENT
Start: 2022-09-24 | End: 2022-09-26 | Stop reason: HOSPADM

## 2022-09-24 RX ORDER — TRAZODONE HYDROCHLORIDE 100 MG/1
200 TABLET ORAL NIGHTLY
Status: DISCONTINUED | OUTPATIENT
Start: 2022-09-24 | End: 2022-09-26 | Stop reason: HOSPADM

## 2022-09-24 RX ORDER — GLUCAGON 1 MG
1 KIT INJECTION
Status: DISCONTINUED | OUTPATIENT
Start: 2022-09-24 | End: 2022-09-26 | Stop reason: HOSPADM

## 2022-09-24 RX ORDER — ONDANSETRON 2 MG/ML
4 INJECTION INTRAMUSCULAR; INTRAVENOUS EVERY 6 HOURS PRN
Status: DISCONTINUED | OUTPATIENT
Start: 2022-09-24 | End: 2022-09-26 | Stop reason: HOSPADM

## 2022-09-24 RX ORDER — BACLOFEN 10 MG/1
10 TABLET ORAL NIGHTLY PRN
Status: DISCONTINUED | OUTPATIENT
Start: 2022-09-24 | End: 2022-09-26 | Stop reason: HOSPADM

## 2022-09-24 RX ORDER — HYDROCODONE BITARTRATE AND ACETAMINOPHEN 5; 325 MG/1; MG/1
1 TABLET ORAL EVERY 6 HOURS PRN
Status: DISCONTINUED | OUTPATIENT
Start: 2022-09-24 | End: 2022-09-26 | Stop reason: HOSPADM

## 2022-09-24 RX ORDER — ATORVASTATIN CALCIUM 40 MG/1
40 TABLET, FILM COATED ORAL NIGHTLY
Status: DISCONTINUED | OUTPATIENT
Start: 2022-09-24 | End: 2022-09-26 | Stop reason: HOSPADM

## 2022-09-24 RX ORDER — LEVALBUTEROL INHALATION SOLUTION 0.63 MG/3ML
0.63 SOLUTION RESPIRATORY (INHALATION) EVERY 8 HOURS
Status: DISCONTINUED | OUTPATIENT
Start: 2022-09-24 | End: 2022-09-26 | Stop reason: HOSPADM

## 2022-09-24 RX ORDER — ALLOPURINOL 100 MG/1
100 TABLET ORAL DAILY
Status: DISCONTINUED | OUTPATIENT
Start: 2022-09-24 | End: 2022-09-26 | Stop reason: HOSPADM

## 2022-09-24 RX ORDER — RANOLAZINE 500 MG/1
1000 TABLET, EXTENDED RELEASE ORAL 2 TIMES DAILY
Status: DISCONTINUED | OUTPATIENT
Start: 2022-09-24 | End: 2022-09-26 | Stop reason: HOSPADM

## 2022-09-24 RX ORDER — LIDOCAINE HYDROCHLORIDE 20 MG/ML
15 SOLUTION OROPHARYNGEAL ONCE
Status: COMPLETED | OUTPATIENT
Start: 2022-09-24 | End: 2022-09-24

## 2022-09-24 RX ORDER — SERTRALINE HYDROCHLORIDE 50 MG/1
100 TABLET, FILM COATED ORAL NIGHTLY
Status: DISCONTINUED | OUTPATIENT
Start: 2022-09-24 | End: 2022-09-26 | Stop reason: HOSPADM

## 2022-09-24 RX ORDER — AMLODIPINE BESYLATE 2.5 MG/1
2.5 TABLET ORAL DAILY
Status: DISCONTINUED | OUTPATIENT
Start: 2022-09-24 | End: 2022-09-26 | Stop reason: HOSPADM

## 2022-09-24 RX ORDER — PROCHLORPERAZINE EDISYLATE 5 MG/ML
5 INJECTION INTRAMUSCULAR; INTRAVENOUS EVERY 6 HOURS PRN
Status: DISCONTINUED | OUTPATIENT
Start: 2022-09-24 | End: 2022-09-26 | Stop reason: HOSPADM

## 2022-09-24 RX ORDER — IPRATROPIUM BROMIDE AND ALBUTEROL SULFATE 2.5; .5 MG/3ML; MG/3ML
3 SOLUTION RESPIRATORY (INHALATION) EVERY 6 HOURS PRN
Status: DISCONTINUED | OUTPATIENT
Start: 2022-09-24 | End: 2022-09-26 | Stop reason: HOSPADM

## 2022-09-24 RX ORDER — MAG HYDROX/ALUMINUM HYD/SIMETH 200-200-20
30 SUSPENSION, ORAL (FINAL DOSE FORM) ORAL ONCE
Status: COMPLETED | OUTPATIENT
Start: 2022-09-24 | End: 2022-09-24

## 2022-09-24 RX ORDER — METOPROLOL SUCCINATE 25 MG/1
25 TABLET, EXTENDED RELEASE ORAL NIGHTLY
Status: DISCONTINUED | OUTPATIENT
Start: 2022-09-24 | End: 2022-09-24

## 2022-09-24 RX ORDER — AMOXICILLIN 250 MG
1 CAPSULE ORAL 2 TIMES DAILY
Status: DISCONTINUED | OUTPATIENT
Start: 2022-09-24 | End: 2022-09-26 | Stop reason: HOSPADM

## 2022-09-24 RX ORDER — TALC
6 POWDER (GRAM) TOPICAL NIGHTLY PRN
Status: DISCONTINUED | OUTPATIENT
Start: 2022-09-24 | End: 2022-09-26 | Stop reason: HOSPADM

## 2022-09-24 RX ORDER — METOPROLOL SUCCINATE 25 MG/1
25 TABLET, EXTENDED RELEASE ORAL NIGHTLY
Status: DISCONTINUED | OUTPATIENT
Start: 2022-09-25 | End: 2022-09-24

## 2022-09-24 RX ORDER — IBUPROFEN 200 MG
24 TABLET ORAL
Status: DISCONTINUED | OUTPATIENT
Start: 2022-09-24 | End: 2022-09-26 | Stop reason: HOSPADM

## 2022-09-24 RX ORDER — MAG HYDROX/ALUMINUM HYD/SIMETH 200-200-20
30 SUSPENSION, ORAL (FINAL DOSE FORM) ORAL 4 TIMES DAILY PRN
Status: DISCONTINUED | OUTPATIENT
Start: 2022-09-24 | End: 2022-09-26 | Stop reason: HOSPADM

## 2022-09-24 RX ORDER — NALOXONE HCL 0.4 MG/ML
0.02 VIAL (ML) INJECTION
Status: DISCONTINUED | OUTPATIENT
Start: 2022-09-24 | End: 2022-09-26 | Stop reason: HOSPADM

## 2022-09-24 RX ORDER — SIMETHICONE 80 MG
1 TABLET,CHEWABLE ORAL 4 TIMES DAILY PRN
Status: DISCONTINUED | OUTPATIENT
Start: 2022-09-24 | End: 2022-09-26 | Stop reason: HOSPADM

## 2022-09-24 RX ORDER — ASPIRIN 81 MG/1
81 TABLET ORAL DAILY
Status: DISCONTINUED | OUTPATIENT
Start: 2022-09-24 | End: 2022-09-26 | Stop reason: HOSPADM

## 2022-09-24 RX ORDER — ARFORMOTEROL TARTRATE 15 UG/2ML
15 SOLUTION RESPIRATORY (INHALATION) 2 TIMES DAILY
Status: DISCONTINUED | OUTPATIENT
Start: 2022-09-24 | End: 2022-09-26 | Stop reason: HOSPADM

## 2022-09-24 RX ORDER — ACETAMINOPHEN 325 MG/1
650 TABLET ORAL EVERY 4 HOURS PRN
Status: DISCONTINUED | OUTPATIENT
Start: 2022-09-24 | End: 2022-09-26 | Stop reason: HOSPADM

## 2022-09-24 RX ORDER — IBUPROFEN 200 MG
16 TABLET ORAL
Status: DISCONTINUED | OUTPATIENT
Start: 2022-09-24 | End: 2022-09-26 | Stop reason: HOSPADM

## 2022-09-24 RX ORDER — LANOLIN ALCOHOL/MO/W.PET/CERES
800 CREAM (GRAM) TOPICAL
Status: DISCONTINUED | OUTPATIENT
Start: 2022-09-24 | End: 2022-09-26 | Stop reason: HOSPADM

## 2022-09-24 RX ORDER — INSULIN ASPART 100 [IU]/ML
0-5 INJECTION, SOLUTION INTRAVENOUS; SUBCUTANEOUS
Status: DISCONTINUED | OUTPATIENT
Start: 2022-09-24 | End: 2022-09-26 | Stop reason: HOSPADM

## 2022-09-24 RX ORDER — SODIUM CHLORIDE 0.9 % (FLUSH) 0.9 %
10 SYRINGE (ML) INJECTION EVERY 8 HOURS PRN
Status: DISCONTINUED | OUTPATIENT
Start: 2022-09-24 | End: 2022-09-26 | Stop reason: HOSPADM

## 2022-09-24 RX ORDER — HYDROCODONE BITARTRATE AND ACETAMINOPHEN 5; 325 MG/1; MG/1
1 TABLET ORAL ONCE
Status: COMPLETED | OUTPATIENT
Start: 2022-09-24 | End: 2022-09-24

## 2022-09-24 RX ADMIN — GABAPENTIN 100 MG: 100 CAPSULE ORAL at 08:09

## 2022-09-24 RX ADMIN — RANOLAZINE 1000 MG: 500 TABLET, EXTENDED RELEASE ORAL at 08:09

## 2022-09-24 RX ADMIN — LEVALBUTEROL HYDROCHLORIDE 0.63 MG: 0.63 SOLUTION RESPIRATORY (INHALATION) at 04:09

## 2022-09-24 RX ADMIN — APIXABAN 5 MG: 2.5 TABLET, FILM COATED ORAL at 09:09

## 2022-09-24 RX ADMIN — APIXABAN 5 MG: 2.5 TABLET, FILM COATED ORAL at 08:09

## 2022-09-24 RX ADMIN — SERTRALINE HYDROCHLORIDE 100 MG: 50 TABLET ORAL at 08:09

## 2022-09-24 RX ADMIN — ARFORMOTEROL TARTRATE 15 MCG: 15 SOLUTION RESPIRATORY (INHALATION) at 08:09

## 2022-09-24 RX ADMIN — TRAZODONE HYDROCHLORIDE 200 MG: 100 TABLET ORAL at 04:09

## 2022-09-24 RX ADMIN — NITROGLYCERIN 1 PATCH: 0.1 PATCH TRANSDERMAL at 06:09

## 2022-09-24 RX ADMIN — ATORVASTATIN CALCIUM 40 MG: 40 TABLET, FILM COATED ORAL at 08:09

## 2022-09-24 RX ADMIN — DOCUSATE SODIUM AND SENNOSIDES 1 TABLET: 8.6; 5 TABLET, FILM COATED ORAL at 08:09

## 2022-09-24 RX ADMIN — GABAPENTIN 100 MG: 100 CAPSULE ORAL at 02:09

## 2022-09-24 RX ADMIN — HYDROCODONE BITARTRATE AND ACETAMINOPHEN 1 TABLET: 5; 325 TABLET ORAL at 04:09

## 2022-09-24 RX ADMIN — TRAZODONE HYDROCHLORIDE 200 MG: 100 TABLET ORAL at 08:09

## 2022-09-24 RX ADMIN — LIDOCAINE HYDROCHLORIDE 15 ML: 20 SOLUTION ORAL; TOPICAL at 02:09

## 2022-09-24 RX ADMIN — GABAPENTIN 100 MG: 100 CAPSULE ORAL at 09:09

## 2022-09-24 RX ADMIN — LEVALBUTEROL HYDROCHLORIDE 0.63 MG: 0.63 SOLUTION RESPIRATORY (INHALATION) at 11:09

## 2022-09-24 RX ADMIN — ALLOPURINOL 100 MG: 100 TABLET ORAL at 09:09

## 2022-09-24 RX ADMIN — AMLODIPINE BESYLATE 2.5 MG: 2.5 TABLET ORAL at 09:09

## 2022-09-24 RX ADMIN — ATORVASTATIN CALCIUM 40 MG: 40 TABLET, FILM COATED ORAL at 03:09

## 2022-09-24 RX ADMIN — DOCUSATE SODIUM AND SENNOSIDES 1 TABLET: 8.6; 5 TABLET, FILM COATED ORAL at 09:09

## 2022-09-24 RX ADMIN — BUDESONIDE 0.5 MG: 0.5 INHALANT ORAL at 08:09

## 2022-09-24 RX ADMIN — SERTRALINE HYDROCHLORIDE 100 MG: 50 TABLET ORAL at 04:09

## 2022-09-24 RX ADMIN — ASPIRIN 81 MG: 81 TABLET, COATED ORAL at 03:09

## 2022-09-24 RX ADMIN — PANTOPRAZOLE SODIUM 40 MG: 40 TABLET, DELAYED RELEASE ORAL at 09:09

## 2022-09-24 RX ADMIN — RANOLAZINE 1000 MG: 500 TABLET, EXTENDED RELEASE ORAL at 09:09

## 2022-09-24 RX ADMIN — ALUMINUM HYDROXIDE, MAGNESIUM HYDROXIDE, AND SIMETHICONE 30 ML: 200; 200; 20 SUSPENSION ORAL at 02:09

## 2022-09-24 NOTE — ASSESSMENT & PLAN NOTE
Carotid u/s 3/2022 showed  80-89% stenosis right proximal-mid internal carotid artery, 50-59% stenosis left proximal-mid internal carotid artery.  Cont ASA, Statin

## 2022-09-24 NOTE — H&P
[facility] Medicine  History & Physical    Patient Name: Crow Green  MRN: [id number]  Patient Class: OP- Observation  Admission Date: 9/23/2022  Attending Physician: Dr. Charles   Primary Care Provider: Eunice Mora NP         Patient information was obtained from patient and ER records.     Subjective:     Principal Problem: Chest pain   Chief Complaint:   Chief Complaint   Patient presents with    Chest Pain     Pt c/o left-sided chest pain that began approx [id number].  Pt took 3 nitro tabs with some improvement but pain persists.  Pt also c/o SOB but denies weakness.  Pt does report dizziness and blurred vision.        HPI: The patient is a 66 yo male CAD nonobstructive and small vessel Dz per [facility] done in 11/16, PAF s/p PVI in  by Dr. Brown, Bilateral carotid stenosis, CHFpEF 45%, DM, HTN, HLD, Asthma/COPD, SANDRITA-on CPAP, and Obesity presented to ED for chest pain. The patient reports he had an appointment at hospital for sleep study tonight. While sitting outside waiting on transport to bring him to hospital, the patient had onset of left sided chest pain rated 8/10 associated with dizziness and generalized weakness. He took 3 NTG with only slight improvement in pain, so he had transport bring him to ED.     In the ED, BP 65/49- improved to 103/55 with 500ml bolus. EKg showed sinus rhythm with 1st degree heart block, LAFB, Nonspecific ST and T wave abnormality. Labs showed K 3.1, Serum Cr 2.0, Troponin 0.087>0.102 (chronically elevated and at baseline), . CXR showed nothing acute.   GI cocktail given with little improvement in symptoms.     Of note, Pt was hospitalized for chest pain 7/31/22. He was treated for CHF at that time and had a Stress test 8/2/22 which was abnormal showing a scar and a small perfusion defect. Pt then followed up with cardiology and had a repeat Stress test 8/16/22 which showed an old scar but showed no perfusion defect and was  negative for reversible ischemia. Echo 7/31/22 showed systolic and diastolic dysfunction with EF 40-45%-no WMA.     The patient will be placed in observation under hospital medicine       Past Medical History:   Diagnosis Date    Arthritis     Asthma     Atrial fibrillation     Atrial fibrillation with RVR 8/7/2019    CHF (congestive heart failure)     Chronic obstructive pulmonary disease 8/5/2020    Depression     Dermatomyositis     Diabetes mellitus, type 2 Diagnosed in 2000    Elevated PSA     Follow up 7/30/2020    Hypertension     Myocardial infarction     2017    Sleep apnea        Past Surgical History:   Procedure Laterality Date    ABLATION OF ARRHYTHMOGENIC FOCUS FOR ATRIAL FIBRILLATION N/A 2/27/2020    Procedure: Ablation atrial fibrillation;  Surgeon: Gio Brown MD;  Location: [facility] EP LAB;  Service: Cardiology;  Laterality: N/A;  afib, DAMIEN (cx if SR), PVI, PITO, anes, MB, 3 Prep    CHOLECYSTECTOMY      CLOSURE OF WOUND Left 7/1/2020    Procedure: CLOSURE, WOUND;  Surgeon: Barb Veras DPM;  Location: [facility] OR;  Service: Podiatry;  Laterality: Left;    COLONOSCOPY N/A 3/4/2022    Procedure: COLONOSCOPY;  Surgeon: Yolis Freitas MD;  Location: [facility] ENDO;  Service: Endoscopy;  Laterality: N/A;    ESOPHAGOGASTRODUODENOSCOPY N/A 3/4/2022    Procedure: EGD (ESOPHAGOGASTRODUODENOSCOPY);  Surgeon: Yolis Freitas MD;  Location: [facility] ENDO;  Service: Endoscopy;  Laterality: N/A;    INJECTION OF ANESTHETIC AGENT INTO SACROILIAC JOINT Left 3/24/2021    Procedure: Left BLOCK, SACROILIAC JOINT and bilateral rhomboid TPI;  Surgeon: Dillan Tsai MD;  Location: [facility] PAIN MGT;  Service: Pain Management;  Laterality: Left;    INTRALUMINAL GASTROINTESTINAL TRACT IMAGING VIA CAPSULE N/A 3/22/2022    Procedure: IMAGING PROCEDURE, GI TRACT, INTRALUMINAL, VIA CAPSULE;  Surgeon: Justice Martinez RN;  Location: [facility] ENDO;  Service: Endoscopy;  Laterality: N/A;    REVERSE TOTAL SHOULDER  ARTHROPLASTY Left 1/10/2022    Procedure: ARTHROPLASTY, SHOULDER, TOTAL, REVERSE;  Surgeon: Anthony Alvarado MD;  Location: [facility] OR;  Service: Orthopedics;  Laterality: Left;  left reverse total shoulder arthroplasty     SELECTIVE INJECTION OF ANESTHETIC AGENT AROUND LUMBAR SPINAL NERVE ROOT BY TRANSFORAMINAL APPROACH Left 6/11/2020    Procedure: BLOCK, SPINAL NERVE ROOT, LUMBAR, SELECTIVE, TRANSFORAMINAL APPROACH Left L4-5, L5-S1 TFESI with RN IV sedation;  Surgeon: Dillan Tsai MD;  Location: [facility] PAIN MGT;  Service: Pain Management;  Laterality: Left;    TOE AMPUTATION Left 6/29/2020    Procedure: AMPUTATION, TOE;  Surgeon: Barb Veras DPM;  Location: [facility] OR;  Service: Podiatry;  Laterality: Left;  great toe       Review of patient's allergies indicates:   Allergen Reactions    Protamine Hives     Urticaria, possible upper airway swelling       No current facility-administered medications on file prior to encounter.     Current Outpatient Medications on File Prior to Encounter   Medication Sig    acetaminophen (TYLENOL) 500 MG tablet Take 2 tablets (1,000 mg total) by mouth every 8 (eight) hours as needed for Pain.    albuterol (PROVENTIL) 2.5 mg /3 mL (0.083 %) nebulizer solution Take 3 mLs (2.5 mg total) by nebulization every 4 to 6 hours as needed for Wheezing or Shortness of Breath.    allopurinoL (ZYLOPRIM) 100 MG tablet Take 1 tablet (100 mg total) by mouth once daily.    aspirin (ECOTRIN) 81 MG EC tablet Take 1 tablet (81 mg total) by mouth once daily.    atorvastatin (LIPITOR) 40 MG tablet TAKE 1 TABLET BY MOUTH EVERY EVENING    azaTHIOprine (IMURAN) 50 mg Tab Take 1 tablet (50 mg total) by mouth once daily.    baclofen (LIORESAL) 10 MG tablet Take 1 tablet (10 mg total) by mouth once daily.    blood sugar diagnostic Strp 1 each by Misc.(Non-Drug; Combo Route) route 4 (four) times daily.    blood-glucose meter (TRUE METRIX GLUCOSE METER) kit Use as instructed to test blood  glucose meter daily.    ELIQUIS 5 mg Tab Take 1 tablet (5 mg total) by mouth 2 (two) times daily.    fluticasone propionate (FLONASE) 50 mcg/actuation nasal spray 2 sprays (100 mcg total) by Each Nostril route 2 (two) times daily.    fluticasone-salmeterol diskus inhaler 250-50 mcg Inhale 1 puff into the lungs 2 (two) times daily. Controller    furosemide (LASIX) 40 MG tablet Take 2 tablets (80 mg total) by mouth 2 (two) times a day.    gabapentin (NEURONTIN) 800 MG tablet Take 1 tablet (800 mg total) by mouth 3 (three) times daily.    insulin aspart U-100 (NOVOLOG FLEXPEN U-100 INSULIN) 100 unit/mL (3 mL) InPn pen INJECT 10 UNITS SUBCUTANEOUSLY THREE TIMES DAILY WITH MEALS    ipratropium (ATROVENT) 0.02 % nebulizer solution INHALE THE CONTENTS OF 1 VIAL VIA NEBULIZER FOUR TIMES DAILY    isosorbide mononitrate (IMDUR) 60 MG 24 hr tablet Take 1 tablet (60 mg total) by mouth 2 (two) times a day.    JARDIANCE 25 mg tablet TAKE 1 TABLET(25 MG) BY MOUTH EVERY DAY    latanoprost 0.005 % ophthalmic solution INSTILL 1 DROP INTO BOTH EYES ONE TIME DAILY. BOTTLE EXPIRES 42 DAYS AFTER OPENING    levocetirizine (XYZAL) 5 MG tablet Take 1 tablet (5 mg total) by mouth every evening.    metoprolol succinate (TOPROL-XL) 50 MG 24 hr tablet Take 1 tablet (50 mg total) by mouth every evening.    MICRO THIN LANCETS 33 gauge Misc     pantoprazole (PROTONIX) 40 MG tablet Take 1 tablet (40 mg total) by mouth once daily.    ranolazine (RANEXA) 1,000 mg Tb12 Take 1 tablet (1,000 mg total) by mouth 2 (two) times daily.    sacubitriL-valsartan (ENTRESTO) 24-26 mg per tablet Take 1 tablet by mouth 2 (two) times daily.    semaglutide (OZEMPIC) 2 mg/dose (8 mg/3 mL) PnIj Inject 2 mg into the skin every 7 days.    sertraline (ZOLOFT) 100 MG tablet Take 1 tablet (100 mg total) by mouth every evening.    tamsulosin (FLOMAX) 0.4 mg Cap Take 1 capsule (0.4 mg total) by mouth once daily.    tiotropium (SPIRIVA WITH HANDIHALER) 18  mcg inhalation capsule INHALE THE CONTENTS OF 1 CAPSULE ONE TIME DAILY (CONTROLLER)    traMADoL (ULTRAM) 50 mg tablet Take 1 tablet (50 mg total) by mouth every 8 (eight) hours as needed for Pain.    traZODone (DESYREL) 100 MG tablet Take 2 tablets (200 mg total) by mouth every evening.    triamcinolone acetonide 0.1% (KENALOG) 0.1 % ointment Apply topically 2 (two) times daily. Use on legs and back    VIOS AEROSOL DELIVERY SYSTEM Shefali USE AS DIRESTED    [DISCONTINUED] amLODIPine (NORVASC) 2.5 MG tablet Take 1 tablet (2.5 mg total) by mouth every evening.    [DISCONTINUED] diclofenac sodium (VOLTAREN) 1 % Gel APPLY 2 GRAMS TOPICALLY FOUR TIMES DAILY    [DISCONTINUED] nitroGLYCERIN (NITROSTAT) 0.4 MG SL tablet Place 1 tablet (0.4 mg total) under the tongue every 5 (five) minutes as needed for Chest pain.     Family History       Problem Relation (Age of Onset)    Cancer Maternal Grandfather    Cataracts Mother, Father, Sister    Diabetes Mother, Sister, Maternal Aunt    Glaucoma Mother, Sister, Maternal Aunt    No Known Problems Brother, Daughter, Son    Stroke Father, Sister          Tobacco Use    Smoking status: Never    Smokeless tobacco: Never   Substance and Sexual Activity    Alcohol use: Yes     Comment: rare, maybe holidays    Drug use: Not Currently     Types: Cocaine    Sexual activity: Not Currently     Partners: Female     Review of Systems   Constitutional:  Negative for appetite change, chills, diaphoresis, fatigue and fever.   HENT:  Negative for congestion, nosebleeds, sore throat and trouble swallowing.    Eyes:  Negative for pain, discharge and visual disturbance.   Respiratory:  Negative for apnea, cough, chest tightness, shortness of breath, wheezing and stridor.    Cardiovascular:  Positive for chest pain. Negative for palpitations and leg swelling.   Gastrointestinal:  Negative for abdominal distention, abdominal pain, blood in stool, constipation, diarrhea, nausea and vomiting.    Endocrine: Negative for cold intolerance and heat intolerance.   Genitourinary:  Negative for difficulty urinating, dysuria, flank pain, frequency and urgency.   Musculoskeletal:  Negative for arthralgias, back pain, joint swelling, myalgias, neck pain and neck stiffness.   Skin:  Negative for rash and wound.   Allergic/Immunologic: Negative for food allergies and immunocompromised state.   Neurological:  Positive for dizziness and weakness (generalized). Negative for seizures, syncope, facial asymmetry, light-headedness and headaches.   Hematological:  Negative for adenopathy.   Psychiatric/Behavioral:  Negative for agitation, behavioral problems and confusion. The patient is not nervous/anxious.    Objective:     Vital Signs (Most Recent):  Temp: 97.8 °F (36.6 °C) (09/24/22 0353)  Pulse: 88 (09/24/22 0353)  Resp: 20 (09/24/22 0353)  BP: 117/67 (09/24/22 0353)  SpO2: 97 % (09/24/22 0353) Vital Signs (24h Range):  Temp:  [97.8 °F (36.6 °C)-98.3 °F (36.8 °C)] 97.8 °F (36.6 °C)  Pulse:  [76-90] 88  Resp:  [12-20] 20  SpO2:  [95 %-98 %] 97 %  BP: ()/(43-69) 117/67     Weight: 85.4 kg (188 lb 4.4 oz)  Body mass index is 31.33 kg/m².    Physical Exam  Vitals and nursing note reviewed.   Constitutional:       General: He is not in acute distress.     Appearance: He is well-developed. He is not diaphoretic.   HENT:      Head: Normocephalic and atraumatic.      Nose: Nose normal.   Eyes:      General: No scleral icterus.     Extraocular Movements: EOM normal.      Conjunctiva/sclera: Conjunctivae normal.   Cardiovascular:      Rate and Rhythm: Normal rate and regular rhythm.      Heart sounds: Normal heart sounds. No murmur heard.    No friction rub. No gallop.   Pulmonary:      Effort: Pulmonary effort is normal. No respiratory distress.      Breath sounds: Normal breath sounds. No stridor. No wheezing or rales.   Chest:      Chest wall: No tenderness.   Abdominal:      General: Bowel sounds are normal. There is no  distension.      Palpations: Abdomen is soft.      Tenderness: There is no abdominal tenderness. There is no guarding or rebound.   Musculoskeletal:         General: No tenderness or deformity. Normal range of motion.      Cervical back: Normal range of motion and neck supple.   Skin:     General: Skin is warm and dry.      Coloration: Skin is not pale.      Findings: No erythema or rash.   Neurological:      Mental Status: He is alert and oriented to person, place, and time.      Cranial Nerves: No cranial nerve deficit.      Motor: No abnormal muscle tone.      Coordination: Coordination normal.      Deep Tendon Reflexes: Reflexes are normal and symmetric.   Psychiatric:         Behavior: Behavior normal.         Thought Content: Thought content normal.         CRANIAL NERVES     CN III, IV, VI   Extraocular motions are normal.      Significant Labs: All pertinent labs within the past 24 hours have been reviewed.    Significant Imaging: I have reviewed all pertinent imaging results/findings within the past 24 hours.    Assessment/Plan:     Chest pain  Serial troponin   Troponin is chronically elevated and appears at baseline  Cont ASA, BB, statin   Stress test 8/2/22 which was abnormal showing a scar and a small perfusion defect.   Stress test 8/16/22 which showed an old scar but showed no perfusion defect and was negative for reversible ischemia.   Echo 7/31/22 showed systolic and diastolic dysfunction with EF 40-45%-no WMA.   Consult cardiology       Hypotension due to medication  Likely 2/2 NTG  Hold home medications Imdur and Entresto for now   Monitor         Elevated troponin  See plan for CP      Acute renal failure superimposed on stage 3 chronic kidney disease  Received IV hydration in ED  Hold entresto   Monitor renal function           Bilateral carotid artery stenosis  Carotid u/s 3/2022 showed  80-89% stenosis right proximal-mid internal carotid artery, 50-59% stenosis left proximal-mid internal  carotid artery.  Cont ASA, Statin          PAF (paroxysmal atrial fibrillation)  Patient with Paroxysmal (<7 days) atrial fibrillation which is controlled currently with Beta Blocker. Patient is currently in sinus rhythm.[id number] Score: 3. HASBLED Score: Unable to calculate. Anticoagulation indicated. Anticoagulation done with Eliquis .        Type 2 diabetes mellitus  Patient's FSGs are controlled on current medication regimen.  Last A1c reviewed-   Lab Results   Component Value Date    HGBA1C 6.0 (H) 07/31/2022     Most recent fingerstick glucose reviewed-   Recent Labs   Lab 09/24/22  0405   POCTGLUCOSE 94     Current correctional scale  Low  Maintain anti-hyperglycemic dose as follows-   Antihyperglycemics (From admission, onward)    Start     Stop Route Frequency Ordered    09/24/22 0427  insulin aspart U-100 pen 0-5 Units         -- SubQ Before meals & nightly PRN 09/24/22 0329        Hold Oral hypoglycemics while patient is in the hospital.    Nonobstructive coronary artery disease of native artery of native heart  See plan for CP        VTE Risk Mitigation (From admission, onward)         Ordered     apixaban tablet 5 mg  2 times daily         09/24/22 0325                   Esther Macias NP  Department of Hospital Medicine   [facility] - Telemetry ([facility])

## 2022-09-24 NOTE — ASSESSMENT & PLAN NOTE
Serial troponin   Cont ASA, BB, statin   Stress test 8/2/22 which was abnormal showing a scar and a small perfusion defect.   Stress test 8/16/22 which showed an old scar but showed no perfusion defect and was negative for reversible ischemia.   Echo 7/31/22 showed systolic and diastolic dysfunction with EF 40-45%-no WMA.   Consult cardiology     9/24-  UDS positive for cocaine . Pt admits to use for recreation   Troponin elevation is flat  BB held  Continue ASA, Statin , CCB

## 2022-09-24 NOTE — ASSESSMENT & PLAN NOTE
Recurrent chest pain   Stress test x2 without ischemia, chronic scars   Holding Imdur due to hypotension  Start nitro patch  Continue Ranexa

## 2022-09-24 NOTE — HPI
The patient is a 68 yo male CAD nonobstructive and small vessel Dz per C done in 11/16, PAF s/p PVI in  by Dr. Brown, Bilateral carotid stenosis, CHFpEF 45%, DM, HTN, HLD, Asthma/COPD, SANDRITA-on CPAP, and Obesity presented to ED for chest pain. The patient reports he had an appointment at hospital for sleep study tonight. While sitting outside waiting on transport to bring him to hospital, the patient had onset of left sided chest pain rated 8/10 associated with dizziness and generalized weakness. He took 3 NTG with only slight improvement in pain, so he had transport bring him to ED.     In the ED, BP 65/49- improved to 103/55 with 500ml bolus. EKg showed sinus rhythm with 1st degree heart block, LAFB, Nonspecific ST and T wave abnormality. Labs showed K 3.1, Serum Cr 2.0, Troponin 0.087>0.102 (chronically elevated and at baseline), . CXR showed nothing acute.   GI cocktail given with little improvement in symptoms.     Of note, Pt was hospitalized for chest pain 7/31/22. He was treated for CHF at that time and had a Stress test 8/2/22 which was abnormal showing a scar and a small perfusion defect. Pt then followed up with cardiology and had a repeat Stress test 8/16/22 which showed an old scar but showed no perfusion defect and was negative for reversible ischemia. Echo 7/31/22 showed systolic and diastolic dysfunction with EF 40-45%-no WMA.

## 2022-09-24 NOTE — ASSESSMENT & PLAN NOTE
Patient's FSGs are controlled on current medication regimen.  Last A1c reviewed-   Lab Results   Component Value Date    HGBA1C 6.0 (H) 07/31/2022     Most recent fingerstick glucose reviewed-   Recent Labs   Lab 09/24/22  0405 09/24/22  0524   POCTGLUCOSE 94 153*     Current correctional scale  Low  Maintain anti-hyperglycemic dose as follows-   Antihyperglycemics (From admission, onward)    Start     Stop Route Frequency Ordered    09/24/22 0427  insulin aspart U-100 pen 0-5 Units         -- SubQ Before meals & nightly PRN 09/24/22 0329        Hold Oral hypoglycemics while patient is in the hospital.

## 2022-09-24 NOTE — PROGRESS NOTES
[facility] ([facility]  Progress Note    Patient Name: Crow Green  MRN: [id number]  Patient Class: OP- Observation   Admission Date: 9/23/2022  Length of Stay: 0 days  Attending Physician: Davina Donovan MD  Primary Care Provider: Eunice Mora NP        Subjective:     Principal Problem:Chest pain        HPI:  The patient is a 68 yo male CAD nonobstructive and small vessel Dz per LHC done in 11/16, PAF s/p PVI in  by Dr. Brown, Bilateral carotid stenosis, CHFpEF 45%, DM, HTN, HLD, Asthma/COPD, SANDRITA-on CPAP, and Obesity presented to ED for chest pain. The patient reports he had an appointment at hospital for sleep study tonight. While sitting outside waiting on transport to bring him to hospital, the patient had onset of left sided chest pain rated 8/10 associated with dizziness and generalized weakness. He took 3 NTG with only slight improvement in pain, so he had transport bring him to ED.     In the ED, BP 65/49- improved to 103/55 with 500ml bolus. EKg showed sinus rhythm with 1st degree heart block, LAFB, Nonspecific ST and T wave abnormality. Labs showed K 3.1, Serum Cr 2.0, Troponin 0.087>0.102 (chronically elevated and at baseline), . CXR showed nothing acute.   GI cocktail given with little improvement in symptoms.     Of note, Pt was hospitalized for chest pain 7/31/22. He was treated for CHF at that time and had a Stress test 8/2/22 which was abnormal showing a scar and a small perfusion defect. Pt then followed up with cardiology and had a repeat Stress test 8/16/22 which showed an old scar but showed no perfusion defect and was negative for reversible ischemia. Echo 7/31/22 showed systolic and diastolic dysfunction with EF 40-45%-no WMA.           Overview/Hospital Course:  9/24- Admitted for further evaluation of chest pain associated with SOB in the setting of cocaine use. BB held . Troponin elevation is flat. BP is soft. Amlodipine at 2.5 mg  resumed. Entresto held . Pt has underlying COPD/ Asthma. Will utilize inhaled bronchodilators. No peripheral edema noted on exam. Hold diuretics given  marginal BP and no signs of fluid overload. Creatinine improved to 1.6.       Interval History:     UDS positive for cocaine . Pt admits to use cocaine for recreation.     Review of Systems   Constitutional:  Positive for activity change and fatigue. Negative for appetite change, chills, diaphoresis and fever.   HENT:  Negative for congestion, nosebleeds, sore throat and trouble swallowing.    Eyes:  Negative for pain, discharge and visual disturbance.   Respiratory:  Positive for shortness of breath. Negative for apnea, cough, chest tightness, wheezing and stridor.    Cardiovascular:  Positive for chest pain. Negative for palpitations and leg swelling.   Gastrointestinal:  Negative for abdominal distention, abdominal pain, blood in stool, constipation, diarrhea, nausea and vomiting.   Endocrine: Negative for cold intolerance and heat intolerance.   Genitourinary:  Negative for difficulty urinating, dysuria, flank pain, frequency and urgency.   Musculoskeletal:  Negative for arthralgias, back pain, joint swelling, myalgias, neck pain and neck stiffness.   Skin:  Negative for rash and wound.   Allergic/Immunologic: Negative for food allergies and immunocompromised state.   Neurological:  Positive for dizziness and weakness (generalized). Negative for seizures, syncope, facial asymmetry, light-headedness and headaches.   Hematological:  Negative for adenopathy.   Psychiatric/Behavioral:  Negative for agitation, behavioral problems and confusion. The patient is not nervous/anxious.    Objective:     Vital Signs (Most Recent):  Temp: 98.9 °F (37.2 °C) (09/24/22 1103)  Pulse: 84 (09/24/22 1103)  Resp: 18 (09/24/22 1103)  BP: (!) 107/59 (09/24/22 1103)  SpO2: (!) 94 % (09/24/22 1103)   Vital Signs (24h Range):  Temp:  [97.8 °F (36.6 °C)-98.9 °F (37.2 °C)] 98.9 °F (37.2  °C)  Pulse:  [76-90] 84  Resp:  [12-20] 18  SpO2:  [94 %-100 %] 94 %  BP: ()/(43-69) 107/59     Weight: 82 kg (180 lb 12.4 oz)  Body mass index is 30.08 kg/m².    Intake/Output Summary (Last 24 hours) at 9/24/2022 1221  Last data filed at 9/24/2022 0800  Gross per 24 hour   Intake 120 ml   Output 500 ml   Net -380 ml      Physical Exam  Constitutional:       General: He is not in acute distress.     Appearance: He is well-developed. He is not diaphoretic.   HENT:      Head: Normocephalic and atraumatic.      Mouth/Throat:      Pharynx: No oropharyngeal exudate.   Eyes:      Conjunctiva/sclera: Conjunctivae normal.      Pupils: Pupils are equal, round, and reactive to light.   Neck:      Thyroid: No thyromegaly.      Vascular: No JVD.   Cardiovascular:      Rate and Rhythm: Normal rate and regular rhythm.      Heart sounds: Normal heart sounds. No murmur heard.  Pulmonary:      Effort: Pulmonary effort is normal. No respiratory distress.      Breath sounds: Normal breath sounds. No wheezing or rales.   Chest:      Chest wall: No tenderness.   Abdominal:      General: Bowel sounds are normal. There is no distension.      Palpations: Abdomen is soft.      Tenderness: There is no abdominal tenderness. There is no guarding or rebound.   Musculoskeletal:         General: Normal range of motion.      Cervical back: Normal range of motion and neck supple.   Lymphadenopathy:      Cervical: No cervical adenopathy.   Skin:     General: Skin is warm and dry.      Findings: No rash.   Neurological:      Mental Status: He is alert and oriented to person, place, and time.      Cranial Nerves: No cranial nerve deficit.      Sensory: No sensory deficit.      Deep Tendon Reflexes: Reflexes normal.       Significant Labs: All pertinent labs within the past 24 hours have been reviewed.  BMP:   Recent Labs   Lab 09/24/22  0446         K 3.6      CO2 25   BUN 22   CREATININE 1.6*   CALCIUM 8.2*     CBC:   Recent  Labs   Lab 09/23/22 2054 09/24/22  0446   WBC 7.56 5.86   HGB 12.5* 11.9*   HCT 39.9* 38.6*    208     CMP:   Recent Labs   Lab 09/23/22 2054 09/24/22  0446    140   K 3.1* 3.6    103   CO2 24 25    107   BUN 21 22   CREATININE 2.0* 1.6*   CALCIUM 8.0* 8.2*   PROT 7.0 7.0   ALBUMIN 3.2* 3.2*   BILITOT 0.4 0.3   ALKPHOS 79 77   AST 25 22   ALT 12 16   ANIONGAP 14 12     Cardiac Markers:   Recent Labs   Lab 09/23/22 2054   *       Significant Imaging:       Assessment/Plan:      * Chest pain  Serial troponin   Cont ASA, BB, statin   Stress test 8/2/22 which was abnormal showing a scar and a small perfusion defect.   Stress test 8/16/22 which showed an old scar but showed no perfusion defect and was negative for reversible ischemia.   Echo 7/31/22 showed systolic and diastolic dysfunction with EF 40-45%-no WMA.   Consult cardiology     9/24-  UDS positive for cocaine . Pt admits to use for recreation   Troponin elevation is flat  BB held  Continue ASA, Statin , CCB    Substance abuse(UDS + for cocaine )  -Pt admits to use cocaine for recreation   -Advised complete cessation       PAF (paroxysmal atrial fibrillation)  Patient with Paroxysmal (<7 days) atrial fibrillation which is controlled currently with Beta Blocker. Patient is currently in sinus rhythm.[id number] Score: 3. HASBLED Score: Unable to calculate. Anticoagulation indicated. Anticoagulation done with Eliquis .    9/24- BB held due to recent cocaine use . HR currently controlled. Continue Eliquis for AC      Nonobstructive coronary artery disease of native artery of native heart  See plan for CP      Elevated troponin  See plan for CP      Acute renal failure superimposed on stage 3 chronic kidney disease  Received IV hydration in ED  Hold entresto   Monitor renal function   9/24-  Creatinine improved to 1.6 after initiate hydration   Hold Diuretics and ARB for now           Type 2 diabetes mellitus  Patient's FSGs are  controlled on current medication regimen.  Last A1c reviewed-   Lab Results   Component Value Date    HGBA1C 6.0 (H) 07/31/2022     Most recent fingerstick glucose reviewed-   Recent Labs   Lab 09/24/22  0405 09/24/22  0524   POCTGLUCOSE 94 153*     Current correctional scale  Low  Maintain anti-hyperglycemic dose as follows-   Antihyperglycemics (From admission, onward)    Start     Stop Route Frequency Ordered    09/24/22 0427  insulin aspart U-100 pen 0-5 Units         -- SubQ Before meals & nightly PRN 09/24/22 0329        Hold Oral hypoglycemics while patient is in the hospital.    Hypotension due to medication  Likely 2/2 NTG  Hold home medications Imdur and Entresto for now   Monitor         Bilateral carotid artery stenosis  Carotid u/s 3/2022 showed  80-89% stenosis right proximal-mid internal carotid artery, 50-59% stenosis left proximal-mid internal carotid artery.  Cont ASA, Statin            VTE Risk Mitigation (From admission, onward)         Ordered     apixaban tablet 5 mg  2 times daily         09/24/22 0325                Discharge Planning   REJI:      Code Status: Full Code   Is the patient medically ready for discharge?:     Reason for patient still in hospital (select all that apply): Patient trending condition                     Davina Donovan MD  Department of Hospital Medicine   [facility] ([facility])

## 2022-09-24 NOTE — ED NOTES
Notified Dr. Rand that patient's c/o of chest pain that is worse than previous chest pain episode.

## 2022-09-24 NOTE — ED PROVIDER NOTES
SCRIBE #1 NOTE: I, Nimesh Salazar, am scribing for, and in the presence of, Linda Rand MD. I have scribed the entire note.       History     Chief Complaint   Patient presents with    Chest Pain     Pt c/o left-sided chest pain that began approx [id number].  Pt took 3 nitro tabs with some improvement but pain persists.  Pt also c/o SOB but denies weakness.  Pt does report dizziness and blurred vision.     Review of patient's allergies indicates:   Allergen Reactions    Protamine Hives     Urticaria, possible upper airway swelling         History of Present Illness     HPI    9/23/2022, 9:17 PM  History obtained from the patient      History of Present Illness: Crow Green is a 67 y.o. male patient with a PMHx of AFIB, CHF, COPD, DM, HTN, and sleep apnea who presents to the Emergency Department for evaluation of chest pain which onset today approximately 3 hours PTA. Pt reports that he began experiencing L-sided chest pain today that has worsened from baseline. Pt also reports that he took 3 nitro tabs while he was in the waiting room, which only slightly alleviated sxs. Symptoms are constant and moderate to severe. No exacerbating factors reported. Associated sxs include weakness. Patient denies any nausea, vomiting, diarrhea, chills, fever, and all other sxs at this time. No prior Tx reported other than nitro tabs. No further complaints or concerns at this time.       Arrival mode: Personal vehicle - self    PCP: Eunice Mora NP        Past Medical History:  Past Medical History:   Diagnosis Date    Arthritis     Asthma     Atrial fibrillation     Atrial fibrillation with RVR 8/7/2019    CHF (congestive heart failure)     Chronic obstructive pulmonary disease 8/5/2020    Depression     Dermatomyositis     Diabetes mellitus, type 2 Diagnosed in 2000    Elevated PSA     Follow up 7/30/2020    Hypertension     Myocardial infarction     2017    Sleep apnea        Past Surgical History:  Past  Surgical History:   Procedure Laterality Date    ABLATION OF ARRHYTHMOGENIC FOCUS FOR ATRIAL FIBRILLATION N/A 2/27/2020    Procedure: Ablation atrial fibrillation;  Surgeon: Gio Brown MD;  Location: [facility] EP LAB;  Service: Cardiology;  Laterality: N/A;  afib, DAMIEN (cx if SR), PVI, PITO, anes, MB, 3 Prep    CHOLECYSTECTOMY      CLOSURE OF WOUND Left 7/1/2020    Procedure: CLOSURE, WOUND;  Surgeon: Barb Veras DPM;  Location: [facility] OR;  Service: Podiatry;  Laterality: Left;    COLONOSCOPY N/A 3/4/2022    Procedure: COLONOSCOPY;  Surgeon: Yolis Freitas MD;  Location: [facility] ENDO;  Service: Endoscopy;  Laterality: N/A;    ESOPHAGOGASTRODUODENOSCOPY N/A 3/4/2022    Procedure: EGD (ESOPHAGOGASTRODUODENOSCOPY);  Surgeon: Yolis Freitas MD;  Location: [facility] ENDO;  Service: Endoscopy;  Laterality: N/A;    INJECTION OF ANESTHETIC AGENT INTO SACROILIAC JOINT Left 3/24/2021    Procedure: Left BLOCK, SACROILIAC JOINT and bilateral rhomboid TPI;  Surgeon: Dillan Tsai MD;  Location: [facility] PAIN MGT;  Service: Pain Management;  Laterality: Left;    INTRALUMINAL GASTROINTESTINAL TRACT IMAGING VIA CAPSULE N/A 3/22/2022    Procedure: IMAGING PROCEDURE, GI TRACT, INTRALUMINAL, VIA CAPSULE;  Surgeon: Justice Martinez RN;  Location: [facility] ENDO;  Service: Endoscopy;  Laterality: N/A;    REVERSE TOTAL SHOULDER ARTHROPLASTY Left 1/10/2022    Procedure: ARTHROPLASTY, SHOULDER, TOTAL, REVERSE;  Surgeon: Anthony Alvarado MD;  Location: [facility] OR;  Service: Orthopedics;  Laterality: Left;  left reverse total shoulder arthroplasty     SELECTIVE INJECTION OF ANESTHETIC AGENT AROUND LUMBAR SPINAL NERVE ROOT BY TRANSFORAMINAL APPROACH Left 6/11/2020    Procedure: BLOCK, SPINAL NERVE ROOT, LUMBAR, SELECTIVE, TRANSFORAMINAL APPROACH Left L4-5, L5-S1 TFESI with RN IV sedation;  Surgeon: Dillan Tsai MD;  Location: [facility] PAIN MGT;  Service: Pain Management;  Laterality: Left;    TOE AMPUTATION Left 6/29/2020    Procedure: AMPUTATION,  TOE;  Surgeon: Barb Veras DPM;  Location: [facility];  Service: Podiatry;  Laterality: Left;  great toe         Family History:  Family History   Problem Relation Age of Onset    Glaucoma Mother     Cataracts Mother     Diabetes Mother     Cataracts Father     Stroke Father     Glaucoma Sister         x3    Cataracts Sister         x3    Diabetes Sister         x1    Stroke Sister         x1    Glaucoma Maternal Aunt     Diabetes Maternal Aunt     No Known Problems Brother     No Known Problems Daughter     No Known Problems Son     Cancer Maternal Grandfather         lung (smoker)    Prostate cancer Neg Hx     Heart disease Neg Hx     Kidney disease Neg Hx        Social History:  Social History     Tobacco Use    Smoking status: Never    Smokeless tobacco: Never   Substance and Sexual Activity    Alcohol use: Yes     Comment: rare, maybe holidays    Drug use: Not Currently     Types: Cocaine    Sexual activity: Not Currently     Partners: Female        Review of Systems     Review of Systems   Constitutional:  Negative for chills and fever.   HENT:  Negative for sore throat.    Respiratory:  Negative for shortness of breath.    Cardiovascular:  Positive for chest pain (L-sided).   Gastrointestinal:  Negative for diarrhea, nausea and vomiting.   Genitourinary:  Negative for dysuria.   Musculoskeletal:  Negative for back pain.   Skin:  Negative for rash.   Neurological:  Positive for weakness.   Hematological:  Does not bruise/bleed easily.   All other systems reviewed and are negative.     Physical Exam     Initial Vitals   BP Pulse Resp Temp SpO2   09/23/22 2041 09/23/22 2041 09/23/22 2041 09/23/22 2058 09/23/22 2041   (!) 65/49 88 18 98.3 °F (36.8 °C) 97 %      MAP       --                 Physical Exam  Nursing Notes and Vital Signs Reviewed.  Constitutional: Patient is in no acute distress. Well-developed and well-nourished.  Head: Atraumatic. Normocephalic.  Eyes: PERRL. EOM intact. Conjunctivae are not  "pale. No scleral icterus.  ENT: Mucous membranes are moist. Oropharynx is clear and symmetric.    Neck: Supple. Full ROM. No lymphadenopathy.  Cardiovascular: Regular rate. Regular rhythm. No murmurs, rubs, or gallops. Distal pulses are 2+ and symmetric.  Pulmonary/Chest: No respiratory distress. Clear to auscultation bilaterally. No wheezing or rales.  Abdominal: Soft and non-distended.  There is no tenderness.  No rebound, guarding, or rigidity. Good bowel sounds.  Genitourinary: No CVA tenderness  Musculoskeletal: Moves all extremities. No obvious deformities. No edema. No calf tenderness.  Skin: Warm and dry.  Neurological:  Alert, awake, and appropriate.  Normal speech.  No acute focal neurological deficits are appreciated.  Psychiatric: Normal affect. Good eye contact. Appropriate in content.     ED Course   Procedures  ED Vital Signs:  Vitals:    09/23/22 2102 09/23/22 2118 09/23/22 2132 09/23/22 2147   BP: (!) 90/50 (!) 103/55 117/63 116/67   Pulse: 79 81 76 86   Resp: 12 14 15 12   Temp:       TempSrc:       SpO2: 96% 98% 97% 95%   Weight:       Height:        09/23/22 2215 09/23/22 2330 09/24/22 0030 09/24/22 0200   BP: 113/69 (!) 97/55 109/67 115/65   Pulse: 88 90 88 85   Resp: 13 13 12 13   Temp:       TempSrc:       SpO2: 95% 95% 95% 95%   Weight:       Height:        09/24/22 0353 09/24/22 0436 09/24/22 0504 09/24/22 0520   BP: 117/67  117/67 117/67   Pulse: 88  88 88   Resp: 20 16 18 18   Temp: 97.8 °F (36.6 °C)  97.8 °F (36.6 °C) 97.8 °F (36.6 °C)   TempSrc: Oral      SpO2: 97%  100% 100%   Weight: 85.4 kg (188 lb 4.4 oz)  82 kg (180 lb 12.4 oz) 82 kg (180 lb 12.4 oz)   Height:   5' 5" (1.651 m) 5' 5" (1.651 m)    09/24/22 0800 09/24/22 1103 09/24/22 1522   BP: (!) 122/57 (!) 107/59 103/61   Pulse: 87 84 78   Resp: 20 18 20   Temp: 98.8 °F (37.1 °C) 98.9 °F (37.2 °C) 97.7 °F (36.5 °C)   TempSrc: Oral Oral Oral   SpO2: 98% (!) 94% 97%   Weight:      Height:          Abnormal Lab Results:  Labs " Reviewed   CBC W/ AUTO DIFFERENTIAL - Abnormal; Notable for the following components:       Result Value    Hemoglobin 12.5 (*)     Hematocrit 39.9 (*)     MCH 26.5 (*)     MCHC 31.3 (*)     RDW 15.5 (*)     All other components within normal limits    Narrative:     Release to patient->Immediate   COMPREHENSIVE METABOLIC PANEL - Abnormal; Notable for the following components:    Potassium 3.1 (*)     Creatinine 2.0 (*)     Calcium 8.0 (*)     Albumin 3.2 (*)     eGFR 36 (*)     All other components within normal limits    Narrative:     Release to patient->Immediate   B-TYPE NATRIURETIC PEPTIDE - Abnormal; Notable for the following components:     (*)     All other components within normal limits    Narrative:     Release to patient->Immediate   TROPONIN I - Abnormal; Notable for the following components:    Troponin I 0.087 (*)     All other components within normal limits    Narrative:     Release to patient->Immediate   TROPONIN I - Abnormal; Notable for the following components:    Troponin I 0.102 (*)     All other components within normal limits   HIV 1 / 2 ANTIBODY    Narrative:     Release to patient->Immediate   HEPATITIS C ANTIBODY    Narrative:     Release to patient->Immediate   HEP C VIRUS HOLD SPECIMEN    Narrative:     Release to patient->Immediate   PROTIME-INR    Narrative:     Release to patient->Immediate        All Lab Results:  Results for orders placed or performed during the hospital encounter of 09/23/22   HIV 1/2 Ag/Ab (4th Gen)   Result Value Ref Range    HIV 1/2 Ag/Ab Negative Negative   Hepatitis C Antibody   Result Value Ref Range    Hepatitis C Ab Negative Negative   HCV Virus Hold Specimen   Result Value Ref Range    HEP C Virus Hold Specimen Hold for HCV sendout    CBC auto differential   Result Value Ref Range    WBC 7.56 3.90 - 12.70 K/uL    RBC 4.72 4.60 - 6.20 M/uL    Hemoglobin 12.5 (L) 14.0 - 18.0 g/dL    Hematocrit 39.9 (L) 40.0 - 54.0 %    MCV 85 82 - 98 fL    MCH 26.5  (L) 27.0 - 31.0 pg    MCHC 31.3 (L) 32.0 - 36.0 g/dL    RDW 15.5 (H) 11.5 - 14.5 %    Platelets 242 150 - 450 K/uL    MPV 10.6 9.2 - 12.9 fL    Immature Granulocytes 0.1 0.0 - 0.5 %    Gran # (ANC) 4.3 1.8 - 7.7 K/uL    Immature Grans (Abs) 0.01 0.00 - 0.04 K/uL    Lymph # 2.2 1.0 - 4.8 K/uL    Mono # 1.0 0.3 - 1.0 K/uL    Eos # 0.1 0.0 - 0.5 K/uL    Baso # 0.01 0.00 - 0.20 K/uL    nRBC 0 0 /100 WBC    Gran % 56.8 38.0 - 73.0 %    Lymph % 29.2 18.0 - 48.0 %    Mono % 12.6 4.0 - 15.0 %    Eosinophil % 1.2 0.0 - 8.0 %    Basophil % 0.1 0.0 - 1.9 %    Differential Method Automated    Comprehensive metabolic panel   Result Value Ref Range    Sodium 140 136 - 145 mmol/L    Potassium 3.1 (L) 3.5 - 5.1 mmol/L    Chloride 102 95 - 110 mmol/L    CO2 24 23 - 29 mmol/L    Glucose 109 70 - 110 mg/dL    BUN 21 8 - 23 mg/dL    Creatinine 2.0 (H) 0.5 - 1.4 mg/dL    Calcium 8.0 (L) 8.7 - 10.5 mg/dL    Total Protein 7.0 6.0 - 8.4 g/dL    Albumin 3.2 (L) 3.5 - 5.2 g/dL    Total Bilirubin 0.4 0.1 - 1.0 mg/dL    Alkaline Phosphatase 79 55 - 135 U/L    AST 25 10 - 40 U/L    ALT 12 10 - 44 U/L    Anion Gap 14 8 - 16 mmol/L    eGFR 36 (A) >60 mL/min/1.73 m^2   Brain natriuretic peptide   Result Value Ref Range     (H) 0 - 99 pg/mL   Troponin I   Result Value Ref Range    Troponin I 0.087 (H) 0.000 - 0.026 ng/mL   Protime-INR   Result Value Ref Range    Prothrombin Time 11.0 9.0 - 12.5 sec    INR 1.0 0.8 - 1.2   Troponin I   Result Value Ref Range    Troponin I 0.102 (H) 0.000 - 0.026 ng/mL   Troponin I   Result Value Ref Range    Troponin I 0.090 (H) 0.000 - 0.026 ng/mL   Drug screen panel, in-house   Result Value Ref Range    Benzodiazepines Negative Negative    Methadone metabolites Negative Negative    Cocaine (Metab.) Presumptive Positive (A) Negative    Opiate Scrn, Ur Negative Negative    Barbiturate Screen, Ur Negative Negative    Amphetamine Screen, Ur Negative Negative    THC Negative Negative    Phencyclidine Negative  Negative    Creatinine, Urine 128.8 23.0 - 375.0 mg/dL    Toxicology Information SEE COMMENT    CBC with Automated Differential   Result Value Ref Range    WBC 5.86 3.90 - 12.70 K/uL    RBC 4.53 (L) 4.60 - 6.20 M/uL    Hemoglobin 11.9 (L) 14.0 - 18.0 g/dL    Hematocrit 38.6 (L) 40.0 - 54.0 %    MCV 85 82 - 98 fL    MCH 26.3 (L) 27.0 - 31.0 pg    MCHC 30.8 (L) 32.0 - 36.0 g/dL    RDW 15.6 (H) 11.5 - 14.5 %    Platelets 208 150 - 450 K/uL    MPV 11.2 9.2 - 12.9 fL    Immature Granulocytes 0.3 0.0 - 0.5 %    Gran # (ANC) 3.4 1.8 - 7.7 K/uL    Immature Grans (Abs) 0.02 0.00 - 0.04 K/uL    Lymph # 1.9 1.0 - 4.8 K/uL    Mono # 0.5 0.3 - 1.0 K/uL    Eos # 0.1 0.0 - 0.5 K/uL    Baso # 0.01 0.00 - 0.20 K/uL    nRBC 0 0 /100 WBC    Gran % 57.4 38.0 - 73.0 %    Lymph % 32.9 18.0 - 48.0 %    Mono % 7.8 4.0 - 15.0 %    Eosinophil % 1.4 0.0 - 8.0 %    Basophil % 0.2 0.0 - 1.9 %    Differential Method Automated    Comprehensive Metabolic Panel (CMP)   Result Value Ref Range    Sodium 140 136 - 145 mmol/L    Potassium 3.6 3.5 - 5.1 mmol/L    Chloride 103 95 - 110 mmol/L    CO2 25 23 - 29 mmol/L    Glucose 107 70 - 110 mg/dL    BUN 22 8 - 23 mg/dL    Creatinine 1.6 (H) 0.5 - 1.4 mg/dL    Calcium 8.2 (L) 8.7 - 10.5 mg/dL    Total Protein 7.0 6.0 - 8.4 g/dL    Albumin 3.2 (L) 3.5 - 5.2 g/dL    Total Bilirubin 0.3 0.1 - 1.0 mg/dL    Alkaline Phosphatase 77 55 - 135 U/L    AST 22 10 - 40 U/L    ALT 16 10 - 44 U/L    Anion Gap 12 8 - 16 mmol/L    eGFR 48 (A) >60 mL/min/1.73 m^2   Troponin I   Result Value Ref Range    Troponin I 0.086 (H) 0.000 - 0.026 ng/mL   POCT glucose   Result Value Ref Range    POCT Glucose 94 70 - 110 mg/dL   POCT glucose   Result Value Ref Range    POCT Glucose 153 (H) 70 - 110 mg/dL     *Note: Due to a large number of results and/or encounters for the requested time period, some results have not been displayed. A complete set of results can be found in Results Review.       Imaging Results:  Imaging Results               X-Ray Chest 1 View (Final result)  Result time 09/23/22 21:38:15      Final result by Gato Hebert MD (09/23/22 21:38:15)                   Impression:      No acute abnormality.      Electronically signed by: Gato Hebert  Date:    09/23/2022  Time:    21:38               Narrative:    EXAMINATION:  XR CHEST 1 VIEW    CLINICAL HISTORY:  Chest pain, unspecified    TECHNIQUE:  Single frontal view of the chest was performed.    COMPARISON:  Multiple priors.    FINDINGS:  The lungs are clear, with normal appearance of pulmonary vasculature and no pleural effusion or pneumothorax.    The cardiac silhouette is borderline enlarged.  The hilar and mediastinal contours are unremarkable.    Bones are intact.                                       The EKG was ordered, reviewed, and independently interpreted by the ED provider.  Interpretation time: 20:49  Rate: 90 BPM  Rhythm:  Sinus rhythm with 1st degree A-V block  Interpretation: Left anterior fascicular block. ST and T wave abnormality, consider lateral ischemia. No STEMI.           The Emergency Provider reviewed the vital signs and test results, which are outlined above.     ED Discussion       9:15 PM: Pt reports that chest pain has worsened since arrival.    1:50 AM: Discussed case with Esther Macias NP (Hospital Medicine). Dr. Donovan agrees with current care and management of pt and accepts admission.   Admitting Service: Hospital medicine  Admitting Physician: Dr. Donovan  Admit to: Observation Telemetry    1:52 AM: Re-evaluated pt. I have discussed test results, shared treatment plan, and the need for admission with patient and family at bedside. Pt and family express understanding at this time and agree with all information. All questions answered. Pt and family have no further questions or concerns at this time. Pt is ready for admit.       Medical Decision Making:   Clinical Tests:   Lab Tests: Ordered and Reviewed  Radiological Study: Ordered  and Reviewed  Medical Tests: Ordered and Reviewed         ED Medication(s):  Medications   allopurinoL tablet 100 mg (100 mg Oral Given 9/24/22 0915)   aspirin EC tablet 81 mg (81 mg Oral Given 9/24/22 0336)   atorvastatin tablet 40 mg (40 mg Oral Given 9/24/22 0336)   baclofen tablet 10 mg (has no administration in time range)   apixaban tablet 5 mg (5 mg Oral Given 9/24/22 0915)   gabapentin capsule 100 mg (100 mg Oral Given 9/24/22 1430)   pantoprazole EC tablet 40 mg (40 mg Oral Given 9/24/22 0915)   ranolazine 12 hr tablet 1,000 mg (1,000 mg Oral Given 9/24/22 0915)   sertraline tablet 100 mg (100 mg Oral Given 9/24/22 0435)   traZODone tablet 200 mg (200 mg Oral Given 9/24/22 0435)   sodium chloride 0.9% flush 10 mL (has no administration in time range)   albuterol-ipratropium 2.5 mg-0.5 mg/3 mL nebulizer solution 3 mL (has no administration in time range)   melatonin tablet 6 mg (has no administration in time range)   ondansetron injection 4 mg (has no administration in time range)   prochlorperazine injection Soln 5 mg (has no administration in time range)   polyethylene glycol packet 17 g (has no administration in time range)   senna-docusate 8.6-50 mg per tablet 1 tablet (1 tablet Oral Given 9/24/22 0915)   simethicone chewable tablet 80 mg (has no administration in time range)   aluminum-magnesium hydroxide-simethicone 200-200-20 mg/5 mL suspension 30 mL (has no administration in time range)   acetaminophen tablet 650 mg (has no administration in time range)   naloxone 0.4 mg/mL injection 0.02 mg (has no administration in time range)   potassium bicarbonate disintegrating tablet 50 mEq (has no administration in time range)   potassium bicarbonate disintegrating tablet 35 mEq (has no administration in time range)   potassium bicarbonate disintegrating tablet 60 mEq (has no administration in time range)   magnesium oxide tablet 800 mg (has no administration in time range)   magnesium oxide tablet 800 mg  (has no administration in time range)   potassium, sodium phosphates 280-160-250 mg packet 2 packet (has no administration in time range)   potassium, sodium phosphates 280-160-250 mg packet 2 packet (has no administration in time range)   potassium, sodium phosphates 280-160-250 mg packet 2 packet (has no administration in time range)   insulin aspart U-100 pen 0-5 Units (has no administration in time range)   glucose chewable tablet 16 g (has no administration in time range)   glucose chewable tablet 24 g (has no administration in time range)   glucagon (human recombinant) injection 1 mg (has no administration in time range)   HYDROcodone-acetaminophen 5-325 mg per tablet 1 tablet (1 tablet Oral Given 9/24/22 0436)   dextrose 10% bolus 125 mL (has no administration in time range)   dextrose 10% bolus 250 mL (has no administration in time range)   HYDROcodone-acetaminophen 5-325 mg per tablet 1 tablet (has no administration in time range)   influenza (QUADRIVALENT ADJUVANTED PF) vaccine 0.5 mL (has no administration in time range)   pneumoc 20-vince conj-dip cr(PF) (PREVNAR-20 (PF)) injection Syrg 0.5 mL (has no administration in time range)   amLODIPine tablet 2.5 mg (2.5 mg Oral Given 9/24/22 0922)   arformoteroL nebulizer solution 15 mcg ( Nebulization Canceled Entry 9/24/22 1015)   budesonide nebulizer solution 0.5 mg ( Nebulization Canceled Entry 9/24/22 1015)   levalbuterol nebulizer solution 0.63 mg ( Nebulization Canceled Entry 9/24/22 1015)   0.9%  NaCl infusion (0 mLs Intravenous Stopped 9/23/22 2122)   ketorolac injection 15 mg (15 mg Intravenous Given 9/23/22 2120)   aluminum-magnesium hydroxide-simethicone 200-200-20 mg/5 mL suspension 30 mL (30 mLs Oral Given 9/24/22 0206)     And   LIDOcaine HCl 2% oral solution 15 mL (15 mLs Oral Given 9/24/22 0206)   HYDROcodone-acetaminophen 5-325 mg per tablet 1 tablet (1 tablet Oral Given 9/24/22 0436)       Current Discharge Medication List                   Scribe Attestation:   Scribe #1: I performed the above scribed service and the documentation accurately describes the services I performed. I attest to the accuracy of the note.     Attending:   Physician Attestation Statement for Scribe #1: I, Linda Rand MD, personally performed the services described in this documentation, as scribed by Nimesh Salazar, in my presence, and it is both accurate and complete.           Clinical Impression       ICD-10-CM ICD-9-CM   1. Chest pain  R07.9 786.50       Disposition:   Disposition: Placed in Observation  Condition: Fair       Linda Rand MD  09/24/22 1736

## 2022-09-24 NOTE — ASSESSMENT & PLAN NOTE
Serial troponin   Cont ASA, BB, statin   Stress test 8/2/22 which was abnormal showing a scar and a small perfusion defect.   Stress test 8/16/22 which showed an old scar but showed no perfusion defect and was negative for reversible ischemia.   Echo 7/31/22 showed systolic and diastolic dysfunction with EF 40-45%-no WMA.   Consult cardiology

## 2022-09-24 NOTE — ASSESSMENT & PLAN NOTE
Patient with Paroxysmal (<7 days) atrial fibrillation which is controlled currently with Beta Blocker. Patient is currently in sinus rhythm.[id number] Score: 3. HASBLED Score: Unable to calculate. Anticoagulation indicated. Anticoagulation done with Eliquis .    9/24- BB held due to recent cocaine use . HR currently controlled. Continue Eliquis for AC

## 2022-09-24 NOTE — CONSULTS
[facility] - Telemetry ([facility])  Cardiology  Consult Note    Patient Name: Crow Green  MRN: [id number]  Admission Date: 9/23/2022  Hospital Length of Stay: 0 days  Code Status: Full Code   Attending Provider: Davina Donovan MD   Consulting Provider: Floyd Salazar MD  Primary Care Physician: Eunice Mora NP  Principal Problem:Chest pain    Patient information was obtained from patient and ER records.     Inpatient consult to Cardiology  Consult performed by: Floyd Salazar MD  Consult ordered by: Esther Macias NP  Reason for consult: Chest pain        Subjective:     Reason for consult:  Chest pain    HPI:   68 yo M with PMH of  CAD nonobstructive and small vessel Dz per [facility] done in 11/16, PAF s/p PVI in  by Dr. Brown, CHFpEF 45%, DM, HTN, HLD, Asthma/COPD on home O2, and SANDRITA on CPAP who was admitted for chest pain.  Patient has been admitted multiple times for chest pain.  Has had 2 stress tests which have shown scars but no ischemia.  Patient is on antianginals at home.  Labs with potassium 3.1, improved to 3.6.  Creatinine 2, improved to 1.6.  Lasix being held  Troponin 0.087> 0.102> 0.090.  Patient noted to be hypotensive on admission.  Toprol XL, Entresto, Imdur being held.  Echo 7/22 EF 40-45%, grade 3 diastolic dysfunction, mildly reduced RV dysfunction  EKG normal sinus rhythm, first-degree AV block, RBBB, ST-T wave abn (stable)  Patient is planned to have CardioMEMS placed in October      Past Medical History:   Diagnosis Date    Arthritis     Asthma     Atrial fibrillation     Atrial fibrillation with RVR 8/7/2019    CHF (congestive heart failure)     Chronic obstructive pulmonary disease 8/5/2020    Depression     Dermatomyositis     Diabetes mellitus, type 2 Diagnosed in 2000    Elevated PSA     Follow up 7/30/2020    Hypertension     Myocardial infarction     2017    Sleep apnea        Past Surgical History:   Procedure Laterality Date    ABLATION OF  ARRHYTHMOGENIC FOCUS FOR ATRIAL FIBRILLATION N/A 2/27/2020    Procedure: Ablation atrial fibrillation;  Surgeon: Gio Brown MD;  Location: [facility] EP LAB;  Service: Cardiology;  Laterality: N/A;  afib, DAMIEN (cx if SR), PVI, PITO, anes, MB, 3 Prep    CHOLECYSTECTOMY      CLOSURE OF WOUND Left 7/1/2020    Procedure: CLOSURE, WOUND;  Surgeon: Barb Veras DPM;  Location: [facility] OR;  Service: Podiatry;  Laterality: Left;    COLONOSCOPY N/A 3/4/2022    Procedure: COLONOSCOPY;  Surgeon: Yolis Freitas MD;  Location: [facility] ENDO;  Service: Endoscopy;  Laterality: N/A;    ESOPHAGOGASTRODUODENOSCOPY N/A 3/4/2022    Procedure: EGD (ESOPHAGOGASTRODUODENOSCOPY);  Surgeon: Yolis Freitas MD;  Location: [facility] ENDO;  Service: Endoscopy;  Laterality: N/A;    INJECTION OF ANESTHETIC AGENT INTO SACROILIAC JOINT Left 3/24/2021    Procedure: Left BLOCK, SACROILIAC JOINT and bilateral rhomboid TPI;  Surgeon: Dillan Tsai MD;  Location: [facility] PAIN MGT;  Service: Pain Management;  Laterality: Left;    INTRALUMINAL GASTROINTESTINAL TRACT IMAGING VIA CAPSULE N/A 3/22/2022    Procedure: IMAGING PROCEDURE, GI TRACT, INTRALUMINAL, VIA CAPSULE;  Surgeon: Justice Martinez RN;  Location: [facility] ENDO;  Service: Endoscopy;  Laterality: N/A;    REVERSE TOTAL SHOULDER ARTHROPLASTY Left 1/10/2022    Procedure: ARTHROPLASTY, SHOULDER, TOTAL, REVERSE;  Surgeon: Anthony Alvarado MD;  Location: [facility] OR;  Service: Orthopedics;  Laterality: Left;  left reverse total shoulder arthroplasty     SELECTIVE INJECTION OF ANESTHETIC AGENT AROUND LUMBAR SPINAL NERVE ROOT BY TRANSFORAMINAL APPROACH Left 6/11/2020    Procedure: BLOCK, SPINAL NERVE ROOT, LUMBAR, SELECTIVE, TRANSFORAMINAL APPROACH Left L4-5, L5-S1 TFESI with RN IV sedation;  Surgeon: Dillan Tsai MD;  Location: [facility] PAIN MGT;  Service: Pain Management;  Laterality: Left;    TOE AMPUTATION Left 6/29/2020    Procedure: AMPUTATION, TOE;  Surgeon: Barb Veras DPM;  Location: [facility]  OR;  Service: Podiatry;  Laterality: Left;  great toe       Review of patient's allergies indicates:   Allergen Reactions    Protamine Hives     Urticaria, possible upper airway swelling       No current facility-administered medications on file prior to encounter.     Current Outpatient Medications on File Prior to Encounter   Medication Sig    acetaminophen (TYLENOL) 500 MG tablet Take 2 tablets (1,000 mg total) by mouth every 8 (eight) hours as needed for Pain.    albuterol (PROVENTIL) 2.5 mg /3 mL (0.083 %) nebulizer solution Take 3 mLs (2.5 mg total) by nebulization every 4 to 6 hours as needed for Wheezing or Shortness of Breath.    allopurinoL (ZYLOPRIM) 100 MG tablet Take 1 tablet (100 mg total) by mouth once daily.    aspirin (ECOTRIN) 81 MG EC tablet Take 1 tablet (81 mg total) by mouth once daily.    atorvastatin (LIPITOR) 40 MG tablet TAKE 1 TABLET BY MOUTH EVERY EVENING    azaTHIOprine (IMURAN) 50 mg Tab Take 1 tablet (50 mg total) by mouth once daily.    baclofen (LIORESAL) 10 MG tablet Take 1 tablet (10 mg total) by mouth once daily.    blood sugar diagnostic Strp 1 each by Misc.(Non-Drug; Combo Route) route 4 (four) times daily.    blood-glucose meter (TRUE METRIX GLUCOSE METER) kit Use as instructed to test blood glucose meter daily.    ELIQUIS 5 mg Tab Take 1 tablet (5 mg total) by mouth 2 (two) times daily.    fluticasone propionate (FLONASE) 50 mcg/actuation nasal spray 2 sprays (100 mcg total) by Each Nostril route 2 (two) times daily.    fluticasone-salmeterol diskus inhaler 250-50 mcg Inhale 1 puff into the lungs 2 (two) times daily. Controller    furosemide (LASIX) 40 MG tablet Take 2 tablets (80 mg total) by mouth 2 (two) times a day.    gabapentin (NEURONTIN) 800 MG tablet Take 1 tablet (800 mg total) by mouth 3 (three) times daily.    insulin aspart U-100 (NOVOLOG FLEXPEN U-100 INSULIN) 100 unit/mL (3 mL) InPn pen INJECT 10 UNITS SUBCUTANEOUSLY THREE TIMES DAILY WITH MEALS     ipratropium (ATROVENT) 0.02 % nebulizer solution INHALE THE CONTENTS OF 1 VIAL VIA NEBULIZER FOUR TIMES DAILY    isosorbide mononitrate (IMDUR) 60 MG 24 hr tablet Take 1 tablet (60 mg total) by mouth 2 (two) times a day.    JARDIANCE 25 mg tablet TAKE 1 TABLET(25 MG) BY MOUTH EVERY DAY    latanoprost 0.005 % ophthalmic solution INSTILL 1 DROP INTO BOTH EYES ONE TIME DAILY. BOTTLE EXPIRES 42 DAYS AFTER OPENING    levocetirizine (XYZAL) 5 MG tablet Take 1 tablet (5 mg total) by mouth every evening.    metoprolol succinate (TOPROL-XL) 50 MG 24 hr tablet Take 1 tablet (50 mg total) by mouth every evening.    MICRO THIN LANCETS 33 gauge Misc     pantoprazole (PROTONIX) 40 MG tablet Take 1 tablet (40 mg total) by mouth once daily.    ranolazine (RANEXA) 1,000 mg Tb12 Take 1 tablet (1,000 mg total) by mouth 2 (two) times daily.    sacubitriL-valsartan (ENTRESTO) 24-26 mg per tablet Take 1 tablet by mouth 2 (two) times daily.    semaglutide (OZEMPIC) 2 mg/dose (8 mg/3 mL) PnIj Inject 2 mg into the skin every 7 days.    sertraline (ZOLOFT) 100 MG tablet Take 1 tablet (100 mg total) by mouth every evening.    tamsulosin (FLOMAX) 0.4 mg Cap Take 1 capsule (0.4 mg total) by mouth once daily.    tiotropium (SPIRIVA WITH HANDIHALER) 18 mcg inhalation capsule INHALE THE CONTENTS OF 1 CAPSULE ONE TIME DAILY (CONTROLLER)    traMADoL (ULTRAM) 50 mg tablet Take 1 tablet (50 mg total) by mouth every 8 (eight) hours as needed for Pain.    traZODone (DESYREL) 100 MG tablet Take 2 tablets (200 mg total) by mouth every evening.    triamcinolone acetonide 0.1% (KENALOG) 0.1 % ointment Apply topically 2 (two) times daily. Use on legs and back    VIOS AEROSOL DELIVERY SYSTEM Shefali USE AS DIRESTED    [DISCONTINUED] amLODIPine (NORVASC) 2.5 MG tablet Take 1 tablet (2.5 mg total) by mouth every evening.    [DISCONTINUED] diclofenac sodium (VOLTAREN) 1 % Gel APPLY 2 GRAMS TOPICALLY FOUR TIMES DAILY    [DISCONTINUED]  nitroGLYCERIN (NITROSTAT) 0.4 MG SL tablet Place 1 tablet (0.4 mg total) under the tongue every 5 (five) minutes as needed for Chest pain.     Family History       Problem Relation (Age of Onset)    Cancer Maternal Grandfather    Cataracts Mother, Father, Sister    Diabetes Mother, Sister, Maternal Aunt    Glaucoma Mother, Sister, Maternal Aunt    No Known Problems Brother, Daughter, Son    Stroke Father, Sister          Tobacco Use    Smoking status: Never    Smokeless tobacco: Never   Substance and Sexual Activity    Alcohol use: Yes     Comment: rare, maybe holidays    Drug use: Not Currently     Types: Cocaine    Sexual activity: Not Currently     Partners: Female     Review of Systems   Constitutional: Negative for diaphoresis, malaise/fatigue, weight gain and weight loss.   HENT:  Negative for congestion and nosebleeds.    Cardiovascular:  Positive for chest pain. Negative for claudication, cyanosis, dyspnea on exertion, irregular heartbeat, leg swelling, near-syncope, orthopnea, palpitations, paroxysmal nocturnal dyspnea and syncope.   Respiratory:  Positive for shortness of breath. Negative for cough, hemoptysis, sleep disturbances due to breathing, snoring, sputum production and wheezing.    Hematologic/Lymphatic: Negative for bleeding problem. Does not bruise/bleed easily.   Skin:  Negative for rash.   Musculoskeletal:  Negative for arthritis, back pain, falls, joint pain, muscle cramps and muscle weakness.   Gastrointestinal:  Negative for abdominal pain, constipation, diarrhea, heartburn, hematemesis, hematochezia, melena, nausea and vomiting.   Genitourinary:  Negative for dysuria, hematuria and nocturia.   Neurological:  Negative for excessive daytime sleepiness, dizziness, headaches, light-headedness, loss of balance, numbness, vertigo and weakness.   Objective:     Vital Signs (Most Recent):  Temp: 97.7 °F (36.5 °C) (09/24/22 1522)  Pulse: 74 (09/24/22 1622)  Resp: 18 (09/24/22 1622)  BP: 103/61  (09/24/22 1522)  SpO2: 95 % (09/24/22 1622) Vital Signs (24h Range):  Temp:  [97.7 °F (36.5 °C)-98.9 °F (37.2 °C)] 97.7 °F (36.5 °C)  Pulse:  [74-90] 74  Resp:  [12-20] 18  SpO2:  [94 %-100 %] 95 %  BP: ()/(43-69) 103/61     Weight: 82 kg (180 lb 12.4 oz)  Body mass index is 30.08 kg/m².    SpO2: 95 %  O2 Device (Oxygen Therapy): room air      Intake/Output Summary (Last 24 hours) at 9/24/2022 1629  Last data filed at 9/24/2022 1400  Gross per 24 hour   Intake 360 ml   Output 500 ml   Net -140 ml       Lines/Drains/Airways       Peripheral Intravenous Line  Duration                  Peripheral IV - Single Lumen 09/23/22 2050 20 G Right Antecubital <1 day                    Physical Exam  Vitals and nursing note reviewed.   Constitutional:       Appearance: Normal appearance. He is well-developed.   HENT:      Head: Normocephalic.      Mouth/Throat:      Mouth: Mucous membranes are moist.   Neck:      Vascular: No carotid bruit or JVD.   Cardiovascular:      Rate and Rhythm: Normal rate and regular rhythm.      Pulses: Normal pulses.      Heart sounds: Normal heart sounds. No murmur heard.    No friction rub.   Pulmonary:      Effort: Pulmonary effort is normal. No respiratory distress.      Breath sounds: Normal breath sounds. No wheezing or rales.   Abdominal:      General: Bowel sounds are normal. There is no distension.      Palpations: Abdomen is soft.      Tenderness: There is no abdominal tenderness. There is no guarding.   Musculoskeletal:         General: No swelling or tenderness.      Cervical back: Neck supple. No tenderness.      Right lower leg: No edema.      Left lower leg: No edema.   Skin:     General: Skin is warm and dry.      Capillary Refill: Capillary refill takes less than 2 seconds.      Findings: No rash.   Neurological:      General: No focal deficit present.      Mental Status: He is alert and oriented to person, place, and time.   Psychiatric:         Mood and Affect: Mood normal.          Behavior: Behavior normal.         Thought Content: Thought content normal.       Significant Labs: BMP:   Recent Labs   Lab 09/23/22 2054 09/24/22  0446    107    140   K 3.1* 3.6    103   CO2 24 25   BUN 21 22   CREATININE 2.0* 1.6*   CALCIUM 8.0* 8.2*   , CMP   Recent Labs   Lab 09/23/22 2054 09/24/22  0446    140   K 3.1* 3.6    103   CO2 24 25    107   BUN 21 22   CREATININE 2.0* 1.6*   CALCIUM 8.0* 8.2*   PROT 7.0 7.0   ALBUMIN 3.2* 3.2*   BILITOT 0.4 0.3   ALKPHOS 79 77   AST 25 22   ALT 12 16   ANIONGAP 14 12   , CBC   Recent Labs   Lab 09/23/22 2054 09/24/22 0446   WBC 7.56 5.86   HGB 12.5* 11.9*   HCT 39.9* 38.6*    208   , Lipid Panel No results for input(s): CHOL, HDL, LDLCALC, TRIG, CHOLHDL in the last 48 hours., and Troponin   Recent Labs   Lab 09/24/22  0111 09/24/22  0719 09/24/22  1344   TROPONINI 0.102* 0.090* 0.086*       Significant Imaging: Echocardiogram: 2D echo with color flow doppler: No results found. However, due to the size of the patient record, not all encounters were searched. Please check Results Review for a complete set of results. and Transthoracic echo (TTE) complete ([facility] Only):   Results for orders placed or performed during the hospital encounter of 07/30/22   Echo   Result Value Ref Range    BSA 2.03 m2    TDI SEPTAL 0.04 m/s    LV LATERAL E/E' RATIO 26.00 m/s    LV SEPTAL E/E' RATIO 32.50 m/s    LA WIDTH 3.60 cm    IVC diameter 2.26 cm    Left Ventricular Outflow Tract Mean Velocity 0.48833860334209 cm/s    Left Ventricular Outflow Tract Mean Gradient 2.00 mmHg    TDI LATERAL 0.05 m/s    LVIDd 4.08 3.5 - 6.0 cm    IVS 2.06 (A) 0.6 - 1.1 cm    Posterior Wall 1.93 (A) 0.6 - 1.1 cm    Ao root annulus 2.90 cm    LVIDs 3.20 2.1 - 4.0 cm    FS 22 28 - 44 %    LA volume 80.06 cm3    STJ 2.86 cm    Ascending aorta 3.13 cm    LV mass 381.36 g    LA size 4.71 cm    Left Ventricle Relative Wall Thickness 0.95 cm    AV mean gradient  9 mmHg    AV valve area 1.66 cm2    AV Velocity Ratio 0.46     AV index (prosthetic) 0.45     MV valve area p 1/2 method 3.83 cm2    E/A ratio 2.89     Mean e' 0.05 m/s    E wave deceleration time 198.467313518830055 msec    IVRT 59.172995353962675 msec    LVOT diameter 2.17 cm    LVOT area 3.7 cm2    LVOT peak mathew 0.91 m/s    LVOT peak VTI 18.60 cm    Ao peak mathew 2.00 m/s    Ao VTI 41.3 cm    RVOT peak mathew 0.52 m/s    RVOT peak VTI 9.6 cm    Mr max mathew 4.25 m/s    LVOT stroke volume 68.75 cm3    AV peak gradient 16 mmHg    PV mean gradient 0.64 mmHg    E/E' ratio 28.89 m/s    MV Peak E Mathew 1.30 m/s    TR Max Mathew 2.76 m/s    MV stenosis pressure 1/2 time 57.233684115651193 ms    MV Peak A Mathew 0.45 m/s    LV Systolic Volume 40.99 mL    LV Systolic Volume Index 20.8 mL/m2    LV Diastolic Volume 73.19 mL    LV Diastolic Volume Index 37.15 mL/m2    LA Volume Index 40.6 mL/m2    LV Mass Index 194 g/m2    RA Major Axis 4.41 cm    Left Atrium Minor Axis 5.57 cm    Left Atrium Major Axis 5.54 cm    Triscuspid Valve Regurgitation Peak Gradient 30 mmHg    Right Atrial Pressure (from IVC) 15 mmHg    EF 40 %    TV rest pulmonary artery pressure 45 mmHg    Narrative    · The left ventricle is normal in size with severe concentric hypertrophy   and mildly decreased systolic function.  · Mild left atrial enlargement.  · The estimated ejection fraction is 40 - 45%.  · Grade III left ventricular diastolic dysfunction.  · Normal right ventricular size with mildly reduced right ventricular   systolic function.  · Mild tricuspid regurgitation.  · Mild pulmonic regurgitation.  · Elevated central venous pressure (15 mmHg).  · The estimated PA systolic pressure is 45 mmHg.  · There is pulmonary hypertension.        Assessment and Plan:     * Chest pain  Recurrent chest pain   Stress test x2 without ischemia, chronic scars   Holding Imdur due to hypotension  Start nitro patch  Continue Ranexa    Cardiomyopathy  Currently compensated   On  home oxygen  Lasix being held due to JOSE MARIA  Will be receiving CardioMEMS in October    Hypotension due to medication  Patient noted to be hypotensive on admission.  Toprol XL, Entresto, Imdur being held.      Elevated troponin  Chronically elevated  No evidence of ischemia on prior stress test    JOSE MARIA (acute kidney injury)  Acute kidney injury due to dehydration   Lasix and Entresto being held-> renal function improving    PAF (paroxysmal atrial fibrillation)  Currently in normal sinus rhythm   Continue Eliquis    Type 2 diabetes mellitus  Management per Hospital Medicine        VTE Risk Mitigation (From admission, onward)         Ordered     apixaban tablet 5 mg  2 times daily         09/24/22 4461                Thank you for your consult. I will follow-up with patient. Please contact us if you have any additional questions.    Floyd Salazar MD  Cardiology   [facility] - Telemetry ([facility])

## 2022-09-24 NOTE — SUBJECTIVE & OBJECTIVE
Past Medical History:   Diagnosis Date    Arthritis     Asthma     Atrial fibrillation     Atrial fibrillation with RVR 8/7/2019    CHF (congestive heart failure)     Chronic obstructive pulmonary disease 8/5/2020    Depression     Dermatomyositis     Diabetes mellitus, type 2 Diagnosed in 2000    Elevated PSA     Follow up 7/30/2020    Hypertension     Myocardial infarction     2017    Sleep apnea        Past Surgical History:   Procedure Laterality Date    ABLATION OF ARRHYTHMOGENIC FOCUS FOR ATRIAL FIBRILLATION N/A 2/27/2020    Procedure: Ablation atrial fibrillation;  Surgeon: Gio Brown MD;  Location: [facility] EP LAB;  Service: Cardiology;  Laterality: N/A;  afib, DAMIEN (cx if SR), PVI, PITO, anes, MB, 3 Prep    CHOLECYSTECTOMY      CLOSURE OF WOUND Left 7/1/2020    Procedure: CLOSURE, WOUND;  Surgeon: Barb Veras DPM;  Location: [facility] OR;  Service: Podiatry;  Laterality: Left;    COLONOSCOPY N/A 3/4/2022    Procedure: COLONOSCOPY;  Surgeon: Yolis Freitas MD;  Location: [facility] ENDO;  Service: Endoscopy;  Laterality: N/A;    ESOPHAGOGASTRODUODENOSCOPY N/A 3/4/2022    Procedure: EGD (ESOPHAGOGASTRODUODENOSCOPY);  Surgeon: Yolis Freitas MD;  Location: [facility] ENDO;  Service: Endoscopy;  Laterality: N/A;    INJECTION OF ANESTHETIC AGENT INTO SACROILIAC JOINT Left 3/24/2021    Procedure: Left BLOCK, SACROILIAC JOINT and bilateral rhomboid TPI;  Surgeon: Dillan Tsai MD;  Location: [facility] PAIN MGT;  Service: Pain Management;  Laterality: Left;    INTRALUMINAL GASTROINTESTINAL TRACT IMAGING VIA CAPSULE N/A 3/22/2022    Procedure: IMAGING PROCEDURE, GI TRACT, INTRALUMINAL, VIA CAPSULE;  Surgeon: Justice Martinez RN;  Location: [facility] ENDO;  Service: Endoscopy;  Laterality: N/A;    REVERSE TOTAL SHOULDER ARTHROPLASTY Left 1/10/2022    Procedure: ARTHROPLASTY, SHOULDER, TOTAL, REVERSE;  Surgeon: Anthony Alvarado MD;  Location: [facility] OR;  Service: Orthopedics;  Laterality: Left;  left reverse total shoulder  arthroplasty     SELECTIVE INJECTION OF ANESTHETIC AGENT AROUND LUMBAR SPINAL NERVE ROOT BY TRANSFORAMINAL APPROACH Left 6/11/2020    Procedure: BLOCK, SPINAL NERVE ROOT, LUMBAR, SELECTIVE, TRANSFORAMINAL APPROACH Left L4-5, L5-S1 TFESI with RN IV sedation;  Surgeon: Dillan Tsai MD;  Location: [facility] PAIN MGT;  Service: Pain Management;  Laterality: Left;    TOE AMPUTATION Left 6/29/2020    Procedure: AMPUTATION, TOE;  Surgeon: Barb Veras DPM;  Location: [facility] OR;  Service: Podiatry;  Laterality: Left;  great toe       Review of patient's allergies indicates:   Allergen Reactions    Protamine Hives     Urticaria, possible upper airway swelling       No current facility-administered medications on file prior to encounter.     Current Outpatient Medications on File Prior to Encounter   Medication Sig    acetaminophen (TYLENOL) 500 MG tablet Take 2 tablets (1,000 mg total) by mouth every 8 (eight) hours as needed for Pain.    albuterol (PROVENTIL) 2.5 mg /3 mL (0.083 %) nebulizer solution Take 3 mLs (2.5 mg total) by nebulization every 4 to 6 hours as needed for Wheezing or Shortness of Breath.    allopurinoL (ZYLOPRIM) 100 MG tablet Take 1 tablet (100 mg total) by mouth once daily.    aspirin (ECOTRIN) 81 MG EC tablet Take 1 tablet (81 mg total) by mouth once daily.    atorvastatin (LIPITOR) 40 MG tablet TAKE 1 TABLET BY MOUTH EVERY EVENING    azaTHIOprine (IMURAN) 50 mg Tab Take 1 tablet (50 mg total) by mouth once daily.    baclofen (LIORESAL) 10 MG tablet Take 1 tablet (10 mg total) by mouth once daily.    blood sugar diagnostic Strp 1 each by Misc.(Non-Drug; Combo Route) route 4 (four) times daily.    blood-glucose meter (TRUE METRIX GLUCOSE METER) kit Use as instructed to test blood glucose meter daily.    ELIQUIS 5 mg Tab Take 1 tablet (5 mg total) by mouth 2 (two) times daily.    fluticasone propionate (FLONASE) 50 mcg/actuation nasal spray 2 sprays (100 mcg total) by Each Nostril route 2 (two) times daily.     fluticasone-salmeterol diskus inhaler 250-50 mcg Inhale 1 puff into the lungs 2 (two) times daily. Controller    furosemide (LASIX) 40 MG tablet Take 2 tablets (80 mg total) by mouth 2 (two) times a day.    gabapentin (NEURONTIN) 800 MG tablet Take 1 tablet (800 mg total) by mouth 3 (three) times daily.    insulin aspart U-100 (NOVOLOG FLEXPEN U-100 INSULIN) 100 unit/mL (3 mL) InPn pen INJECT 10 UNITS SUBCUTANEOUSLY THREE TIMES DAILY WITH MEALS    ipratropium (ATROVENT) 0.02 % nebulizer solution INHALE THE CONTENTS OF 1 VIAL VIA NEBULIZER FOUR TIMES DAILY    isosorbide mononitrate (IMDUR) 60 MG 24 hr tablet Take 1 tablet (60 mg total) by mouth 2 (two) times a day.    JARDIANCE 25 mg tablet TAKE 1 TABLET(25 MG) BY MOUTH EVERY DAY    latanoprost 0.005 % ophthalmic solution INSTILL 1 DROP INTO BOTH EYES ONE TIME DAILY. BOTTLE EXPIRES 42 DAYS AFTER OPENING    levocetirizine (XYZAL) 5 MG tablet Take 1 tablet (5 mg total) by mouth every evening.    metoprolol succinate (TOPROL-XL) 50 MG 24 hr tablet Take 1 tablet (50 mg total) by mouth every evening.    MICRO THIN LANCETS 33 gauge Misc     pantoprazole (PROTONIX) 40 MG tablet Take 1 tablet (40 mg total) by mouth once daily.    ranolazine (RANEXA) 1,000 mg Tb12 Take 1 tablet (1,000 mg total) by mouth 2 (two) times daily.    sacubitriL-valsartan (ENTRESTO) 24-26 mg per tablet Take 1 tablet by mouth 2 (two) times daily.    semaglutide (OZEMPIC) 2 mg/dose (8 mg/3 mL) PnIj Inject 2 mg into the skin every 7 days.    sertraline (ZOLOFT) 100 MG tablet Take 1 tablet (100 mg total) by mouth every evening.    tamsulosin (FLOMAX) 0.4 mg Cap Take 1 capsule (0.4 mg total) by mouth once daily.    tiotropium (SPIRIVA WITH HANDIHALER) 18 mcg inhalation capsule INHALE THE CONTENTS OF 1 CAPSULE ONE TIME DAILY (CONTROLLER)    traMADoL (ULTRAM) 50 mg tablet Take 1 tablet (50 mg total) by mouth every 8 (eight) hours as needed for Pain.    traZODone (DESYREL) 100 MG tablet Take 2 tablets  (200 mg total) by mouth every evening.    triamcinolone acetonide 0.1% (KENALOG) 0.1 % ointment Apply topically 2 (two) times daily. Use on legs and back    VIOS AEROSOL DELIVERY SYSTEM Shefali USE AS DIRESTED    [DISCONTINUED] amLODIPine (NORVASC) 2.5 MG tablet Take 1 tablet (2.5 mg total) by mouth every evening.    [DISCONTINUED] diclofenac sodium (VOLTAREN) 1 % Gel APPLY 2 GRAMS TOPICALLY FOUR TIMES DAILY    [DISCONTINUED] nitroGLYCERIN (NITROSTAT) 0.4 MG SL tablet Place 1 tablet (0.4 mg total) under the tongue every 5 (five) minutes as needed for Chest pain.     Family History       Problem Relation (Age of Onset)    Cancer Maternal Grandfather    Cataracts Mother, Father, Sister    Diabetes Mother, Sister, Maternal Aunt    Glaucoma Mother, Sister, Maternal Aunt    No Known Problems Brother, Daughter, Son    Stroke Father, Sister          Tobacco Use    Smoking status: Never    Smokeless tobacco: Never   Substance and Sexual Activity    Alcohol use: Yes     Comment: rare, maybe holidays    Drug use: Not Currently     Types: Cocaine    Sexual activity: Not Currently     Partners: Female     Review of Systems   Constitutional:  Negative for appetite change, chills, diaphoresis, fatigue and fever.   HENT:  Negative for congestion, nosebleeds, sore throat and trouble swallowing.    Eyes:  Negative for pain, discharge and visual disturbance.   Respiratory:  Negative for apnea, cough, chest tightness, shortness of breath, wheezing and stridor.    Cardiovascular:  Positive for chest pain. Negative for palpitations and leg swelling.   Gastrointestinal:  Negative for abdominal distention, abdominal pain, blood in stool, constipation, diarrhea, nausea and vomiting.   Endocrine: Negative for cold intolerance and heat intolerance.   Genitourinary:  Negative for difficulty urinating, dysuria, flank pain, frequency and urgency.   Musculoskeletal:  Negative for arthralgias, back pain, joint swelling, myalgias, neck pain and neck  stiffness.   Skin:  Negative for rash and wound.   Allergic/Immunologic: Negative for food allergies and immunocompromised state.   Neurological:  Positive for dizziness and weakness (generalized). Negative for seizures, syncope, facial asymmetry, light-headedness and headaches.   Hematological:  Negative for adenopathy.   Psychiatric/Behavioral:  Negative for agitation, behavioral problems and confusion. The patient is not nervous/anxious.    Objective:     Vital Signs (Most Recent):  Temp: 97.8 °F (36.6 °C) (09/24/22 0353)  Pulse: 88 (09/24/22 0353)  Resp: 20 (09/24/22 0353)  BP: 117/67 (09/24/22 0353)  SpO2: 97 % (09/24/22 0353) Vital Signs (24h Range):  Temp:  [97.8 °F (36.6 °C)-98.3 °F (36.8 °C)] 97.8 °F (36.6 °C)  Pulse:  [76-90] 88  Resp:  [12-20] 20  SpO2:  [95 %-98 %] 97 %  BP: ()/(43-69) 117/67     Weight: 85.4 kg (188 lb 4.4 oz)  Body mass index is 31.33 kg/m².    Physical Exam  Vitals and nursing note reviewed.   Constitutional:       General: He is not in acute distress.     Appearance: He is well-developed. He is not diaphoretic.   HENT:      Head: Normocephalic and atraumatic.      Nose: Nose normal.   Eyes:      General: No scleral icterus.     Extraocular Movements: EOM normal.      Conjunctiva/sclera: Conjunctivae normal.   Cardiovascular:      Rate and Rhythm: Normal rate and regular rhythm.      Heart sounds: Normal heart sounds. No murmur heard.    No friction rub. No gallop.   Pulmonary:      Effort: Pulmonary effort is normal. No respiratory distress.      Breath sounds: Normal breath sounds. No stridor. No wheezing or rales.   Chest:      Chest wall: No tenderness.   Abdominal:      General: Bowel sounds are normal. There is no distension.      Palpations: Abdomen is soft.      Tenderness: There is no abdominal tenderness. There is no guarding or rebound.   Musculoskeletal:         General: No tenderness or deformity. Normal range of motion.      Cervical back: Normal range of motion  and neck supple.   Skin:     General: Skin is warm and dry.      Coloration: Skin is not pale.      Findings: No erythema or rash.   Neurological:      Mental Status: He is alert and oriented to person, place, and time.      Cranial Nerves: No cranial nerve deficit.      Motor: No abnormal muscle tone.      Coordination: Coordination normal.      Deep Tendon Reflexes: Reflexes are normal and symmetric.   Psychiatric:         Behavior: Behavior normal.         Thought Content: Thought content normal.         CRANIAL NERVES     CN III, IV, VI   Extraocular motions are normal.      Significant Labs: All pertinent labs within the past 24 hours have been reviewed.    Significant Imaging: I have reviewed all pertinent imaging results/findings within the past 24 hours.

## 2022-09-24 NOTE — ASSESSMENT & PLAN NOTE
Patient with Paroxysmal (<7 days) atrial fibrillation which is controlled currently with Beta Blocker. Patient is currently in sinus rhythm.[id number] Score: 3. HASBLED Score: Unable to calculate. Anticoagulation indicated. Anticoagulation done with Eliquis .

## 2022-09-24 NOTE — ASSESSMENT & PLAN NOTE
Currently compensated   On home oxygen  Lasix being held due to JOSE MARIA  Will be receiving CardioMEMS in October

## 2022-09-24 NOTE — HOSPITAL COURSE
9/24- Admitted for further evaluation of chest pain associated with SOB in the setting of cocaine use. BB held . Troponin elevation is flat. BP is soft. Amlodipine at 2.5 mg resumed. Entresto held . Pt has underlying COPD/ Asthma. Will utilize inhaled bronchodilators. No peripheral edema noted on exam. Hold diuretics given  marginal BP and no signs of fluid overload. Creatinine improved to 1.6.       9/25- Pt still c/o chest pain , however seems more subjective . Flat affect noted. Troponin elevation is flat and trended down. Known h/o non ischemic cardiomyopathy. Strongly advised of complete cessation and abstinence from cocaine use. Case discussed with Cardiology and suggested to hold off on BB, increase Amlodipine to 5 mg daily and hold Entresto for now due to marginal BP and reinitiate as outpatient once BP permits. Continue Ranexa and change Imdur to Nitroglycerin patch . Pt examined and deemed stable for discharge to home with Home Health , home NP visit and follow up with PCP and Cardiologist.

## 2022-09-24 NOTE — SUBJECTIVE & OBJECTIVE
Interval History:     UDS positive for cocaine . Pt admits to use cocaine for recreation.     Review of Systems   Constitutional:  Positive for activity change and fatigue. Negative for appetite change, chills, diaphoresis and fever.   HENT:  Negative for congestion, nosebleeds, sore throat and trouble swallowing.    Eyes:  Negative for pain, discharge and visual disturbance.   Respiratory:  Positive for shortness of breath. Negative for apnea, cough, chest tightness, wheezing and stridor.    Cardiovascular:  Positive for chest pain. Negative for palpitations and leg swelling.   Gastrointestinal:  Negative for abdominal distention, abdominal pain, blood in stool, constipation, diarrhea, nausea and vomiting.   Endocrine: Negative for cold intolerance and heat intolerance.   Genitourinary:  Negative for difficulty urinating, dysuria, flank pain, frequency and urgency.   Musculoskeletal:  Negative for arthralgias, back pain, joint swelling, myalgias, neck pain and neck stiffness.   Skin:  Negative for rash and wound.   Allergic/Immunologic: Negative for food allergies and immunocompromised state.   Neurological:  Positive for dizziness and weakness (generalized). Negative for seizures, syncope, facial asymmetry, light-headedness and headaches.   Hematological:  Negative for adenopathy.   Psychiatric/Behavioral:  Negative for agitation, behavioral problems and confusion. The patient is not nervous/anxious.    Objective:     Vital Signs (Most Recent):  Temp: 98.9 °F (37.2 °C) (09/24/22 1103)  Pulse: 84 (09/24/22 1103)  Resp: 18 (09/24/22 1103)  BP: (!) 107/59 (09/24/22 1103)  SpO2: (!) 94 % (09/24/22 1103)   Vital Signs (24h Range):  Temp:  [97.8 °F (36.6 °C)-98.9 °F (37.2 °C)] 98.9 °F (37.2 °C)  Pulse:  [76-90] 84  Resp:  [12-20] 18  SpO2:  [94 %-100 %] 94 %  BP: ()/(43-69) 107/59     Weight: 82 kg (180 lb 12.4 oz)  Body mass index is 30.08 kg/m².    Intake/Output Summary (Last 24 hours) at 9/24/2022 1221  Last  data filed at 9/24/2022 0800  Gross per 24 hour   Intake 120 ml   Output 500 ml   Net -380 ml      Physical Exam  Constitutional:       General: He is not in acute distress.     Appearance: He is well-developed. He is not diaphoretic.   HENT:      Head: Normocephalic and atraumatic.      Mouth/Throat:      Pharynx: No oropharyngeal exudate.   Eyes:      Conjunctiva/sclera: Conjunctivae normal.      Pupils: Pupils are equal, round, and reactive to light.   Neck:      Thyroid: No thyromegaly.      Vascular: No JVD.   Cardiovascular:      Rate and Rhythm: Normal rate and regular rhythm.      Heart sounds: Normal heart sounds. No murmur heard.  Pulmonary:      Effort: Pulmonary effort is normal. No respiratory distress.      Breath sounds: Normal breath sounds. No wheezing or rales.   Chest:      Chest wall: No tenderness.   Abdominal:      General: Bowel sounds are normal. There is no distension.      Palpations: Abdomen is soft.      Tenderness: There is no abdominal tenderness. There is no guarding or rebound.   Musculoskeletal:         General: Normal range of motion.      Cervical back: Normal range of motion and neck supple.   Lymphadenopathy:      Cervical: No cervical adenopathy.   Skin:     General: Skin is warm and dry.      Findings: No rash.   Neurological:      Mental Status: He is alert and oriented to person, place, and time.      Cranial Nerves: No cranial nerve deficit.      Sensory: No sensory deficit.      Deep Tendon Reflexes: Reflexes normal.       Significant Labs: All pertinent labs within the past 24 hours have been reviewed.  BMP:   Recent Labs   Lab 09/24/22  0446         K 3.6      CO2 25   BUN 22   CREATININE 1.6*   CALCIUM 8.2*     CBC:   Recent Labs   Lab 09/23/22 2054 09/24/22 0446   WBC 7.56 5.86   HGB 12.5* 11.9*   HCT 39.9* 38.6*    208     CMP:   Recent Labs   Lab 09/23/22 2054 09/24/22 0446    140   K 3.1* 3.6    103   CO2 24 25    107    BUN 21 22   CREATININE 2.0* 1.6*   CALCIUM 8.0* 8.2*   PROT 7.0 7.0   ALBUMIN 3.2* 3.2*   BILITOT 0.4 0.3   ALKPHOS 79 77   AST 25 22   ALT 12 16   ANIONGAP 14 12     Cardiac Markers:   Recent Labs   Lab 09/23/22 2054   *       Significant Imaging:

## 2022-09-24 NOTE — ASSESSMENT & PLAN NOTE
Received IV hydration in ED  Hold entresto   Monitor renal function   9/24-  Creatinine improved to 1.6 after initiate hydration   Hold Diuretics and ARB for now

## 2022-09-24 NOTE — ASSESSMENT & PLAN NOTE
Patient's FSGs are controlled on current medication regimen.  Last A1c reviewed-   Lab Results   Component Value Date    HGBA1C 6.0 (H) 07/31/2022     Most recent fingerstick glucose reviewed-   Recent Labs   Lab 09/24/22  0405   POCTGLUCOSE 94     Current correctional scale  Low  Maintain anti-hyperglycemic dose as follows-   Antihyperglycemics (From admission, onward)    Start     Stop Route Frequency Ordered    09/24/22 0427  insulin aspart U-100 pen 0-5 Units         -- SubQ Before meals & nightly PRN 09/24/22 0329        Hold Oral hypoglycemics while patient is in the hospital.

## 2022-09-24 NOTE — HPI
68 yo M with PMH of  CAD nonobstructive and small vessel Dz per [facility] done in 11/16, PAF s/p PVI in  by Dr. Brown, CHFpEF 45%, DM, HTN, HLD, Asthma/COPD on home O2, and SANDRITA on CPAP who was admitted for chest pain.  Patient has been admitted multiple times for chest pain.  Has had 2 stress tests which have shown scars but no ischemia.  Patient is on antianginals at home.  Labs with potassium 3.1, improved to 3.6.  Creatinine 2, improved to 1.6.  Lasix being held  Troponin 0.087> 0.102> 0.090.  Patient noted to be hypotensive on admission.  Toprol XL, Entresto, Imdur being held.  Echo 7/22 EF 40-45%, grade 3 diastolic dysfunction, mildly reduced RV dysfunction  EKG normal sinus rhythm, first-degree AV block, RBBB, ST-T wave abn (stable)  Patient is planned to have CardioMEMS placed in October

## 2022-09-24 NOTE — SUBJECTIVE & OBJECTIVE
Past Medical History:   Diagnosis Date    Arthritis     Asthma     Atrial fibrillation     Atrial fibrillation with RVR 8/7/2019    CHF (congestive heart failure)     Chronic obstructive pulmonary disease 8/5/2020    Depression     Dermatomyositis     Diabetes mellitus, type 2 Diagnosed in 2000    Elevated PSA     Follow up 7/30/2020    Hypertension     Myocardial infarction     2017    Sleep apnea        Past Surgical History:   Procedure Laterality Date    ABLATION OF ARRHYTHMOGENIC FOCUS FOR ATRIAL FIBRILLATION N/A 2/27/2020    Procedure: Ablation atrial fibrillation;  Surgeon: Gio Brown MD;  Location: [facility] EP LAB;  Service: Cardiology;  Laterality: N/A;  afib, DAMIEN (cx if SR), PVI, PITO, anes, MB, 3 Prep    CHOLECYSTECTOMY      CLOSURE OF WOUND Left 7/1/2020    Procedure: CLOSURE, WOUND;  Surgeon: Barb Veras DPM;  Location: [facility] OR;  Service: Podiatry;  Laterality: Left;    COLONOSCOPY N/A 3/4/2022    Procedure: COLONOSCOPY;  Surgeon: Yolis Freitas MD;  Location: [facility] ENDO;  Service: Endoscopy;  Laterality: N/A;    ESOPHAGOGASTRODUODENOSCOPY N/A 3/4/2022    Procedure: EGD (ESOPHAGOGASTRODUODENOSCOPY);  Surgeon: Yolis Freitas MD;  Location: [facility] ENDO;  Service: Endoscopy;  Laterality: N/A;    INJECTION OF ANESTHETIC AGENT INTO SACROILIAC JOINT Left 3/24/2021    Procedure: Left BLOCK, SACROILIAC JOINT and bilateral rhomboid TPI;  Surgeon: Dillan Tsai MD;  Location: [facility] PAIN MGT;  Service: Pain Management;  Laterality: Left;    INTRALUMINAL GASTROINTESTINAL TRACT IMAGING VIA CAPSULE N/A 3/22/2022    Procedure: IMAGING PROCEDURE, GI TRACT, INTRALUMINAL, VIA CAPSULE;  Surgeon: Justice Martinez RN;  Location: [facility] ENDO;  Service: Endoscopy;  Laterality: N/A;    REVERSE TOTAL SHOULDER ARTHROPLASTY Left 1/10/2022    Procedure: ARTHROPLASTY, SHOULDER, TOTAL, REVERSE;  Surgeon: Anthony lAvarado MD;  Location: [facility] OR;  Service: Orthopedics;  Laterality: Left;  left reverse total shoulder  arthroplasty     SELECTIVE INJECTION OF ANESTHETIC AGENT AROUND LUMBAR SPINAL NERVE ROOT BY TRANSFORAMINAL APPROACH Left 6/11/2020    Procedure: BLOCK, SPINAL NERVE ROOT, LUMBAR, SELECTIVE, TRANSFORAMINAL APPROACH Left L4-5, L5-S1 TFESI with RN IV sedation;  Surgeon: Dillan Tsai MD;  Location: [facility] PAIN MGT;  Service: Pain Management;  Laterality: Left;    TOE AMPUTATION Left 6/29/2020    Procedure: AMPUTATION, TOE;  Surgeon: Barb Veras DPM;  Location: [facility] OR;  Service: Podiatry;  Laterality: Left;  great toe       Review of patient's allergies indicates:   Allergen Reactions    Protamine Hives     Urticaria, possible upper airway swelling       No current facility-administered medications on file prior to encounter.     Current Outpatient Medications on File Prior to Encounter   Medication Sig    acetaminophen (TYLENOL) 500 MG tablet Take 2 tablets (1,000 mg total) by mouth every 8 (eight) hours as needed for Pain.    albuterol (PROVENTIL) 2.5 mg /3 mL (0.083 %) nebulizer solution Take 3 mLs (2.5 mg total) by nebulization every 4 to 6 hours as needed for Wheezing or Shortness of Breath.    allopurinoL (ZYLOPRIM) 100 MG tablet Take 1 tablet (100 mg total) by mouth once daily.    aspirin (ECOTRIN) 81 MG EC tablet Take 1 tablet (81 mg total) by mouth once daily.    atorvastatin (LIPITOR) 40 MG tablet TAKE 1 TABLET BY MOUTH EVERY EVENING    azaTHIOprine (IMURAN) 50 mg Tab Take 1 tablet (50 mg total) by mouth once daily.    baclofen (LIORESAL) 10 MG tablet Take 1 tablet (10 mg total) by mouth once daily.    blood sugar diagnostic Strp 1 each by Misc.(Non-Drug; Combo Route) route 4 (four) times daily.    blood-glucose meter (TRUE METRIX GLUCOSE METER) kit Use as instructed to test blood glucose meter daily.    ELIQUIS 5 mg Tab Take 1 tablet (5 mg total) by mouth 2 (two) times daily.    fluticasone propionate (FLONASE) 50 mcg/actuation nasal spray 2 sprays (100 mcg total) by Each Nostril route 2 (two) times daily.     fluticasone-salmeterol diskus inhaler 250-50 mcg Inhale 1 puff into the lungs 2 (two) times daily. Controller    furosemide (LASIX) 40 MG tablet Take 2 tablets (80 mg total) by mouth 2 (two) times a day.    gabapentin (NEURONTIN) 800 MG tablet Take 1 tablet (800 mg total) by mouth 3 (three) times daily.    insulin aspart U-100 (NOVOLOG FLEXPEN U-100 INSULIN) 100 unit/mL (3 mL) InPn pen INJECT 10 UNITS SUBCUTANEOUSLY THREE TIMES DAILY WITH MEALS    ipratropium (ATROVENT) 0.02 % nebulizer solution INHALE THE CONTENTS OF 1 VIAL VIA NEBULIZER FOUR TIMES DAILY    isosorbide mononitrate (IMDUR) 60 MG 24 hr tablet Take 1 tablet (60 mg total) by mouth 2 (two) times a day.    JARDIANCE 25 mg tablet TAKE 1 TABLET(25 MG) BY MOUTH EVERY DAY    latanoprost 0.005 % ophthalmic solution INSTILL 1 DROP INTO BOTH EYES ONE TIME DAILY. BOTTLE EXPIRES 42 DAYS AFTER OPENING    levocetirizine (XYZAL) 5 MG tablet Take 1 tablet (5 mg total) by mouth every evening.    metoprolol succinate (TOPROL-XL) 50 MG 24 hr tablet Take 1 tablet (50 mg total) by mouth every evening.    MICRO THIN LANCETS 33 gauge Misc     pantoprazole (PROTONIX) 40 MG tablet Take 1 tablet (40 mg total) by mouth once daily.    ranolazine (RANEXA) 1,000 mg Tb12 Take 1 tablet (1,000 mg total) by mouth 2 (two) times daily.    sacubitriL-valsartan (ENTRESTO) 24-26 mg per tablet Take 1 tablet by mouth 2 (two) times daily.    semaglutide (OZEMPIC) 2 mg/dose (8 mg/3 mL) PnIj Inject 2 mg into the skin every 7 days.    sertraline (ZOLOFT) 100 MG tablet Take 1 tablet (100 mg total) by mouth every evening.    tamsulosin (FLOMAX) 0.4 mg Cap Take 1 capsule (0.4 mg total) by mouth once daily.    tiotropium (SPIRIVA WITH HANDIHALER) 18 mcg inhalation capsule INHALE THE CONTENTS OF 1 CAPSULE ONE TIME DAILY (CONTROLLER)    traMADoL (ULTRAM) 50 mg tablet Take 1 tablet (50 mg total) by mouth every 8 (eight) hours as needed for Pain.    traZODone (DESYREL) 100 MG tablet Take 2 tablets  (200 mg total) by mouth every evening.    triamcinolone acetonide 0.1% (KENALOG) 0.1 % ointment Apply topically 2 (two) times daily. Use on legs and back    VIOS AEROSOL DELIVERY SYSTEM Shefali USE AS DIRESTED    [DISCONTINUED] amLODIPine (NORVASC) 2.5 MG tablet Take 1 tablet (2.5 mg total) by mouth every evening.    [DISCONTINUED] diclofenac sodium (VOLTAREN) 1 % Gel APPLY 2 GRAMS TOPICALLY FOUR TIMES DAILY    [DISCONTINUED] nitroGLYCERIN (NITROSTAT) 0.4 MG SL tablet Place 1 tablet (0.4 mg total) under the tongue every 5 (five) minutes as needed for Chest pain.     Family History       Problem Relation (Age of Onset)    Cancer Maternal Grandfather    Cataracts Mother, Father, Sister    Diabetes Mother, Sister, Maternal Aunt    Glaucoma Mother, Sister, Maternal Aunt    No Known Problems Brother, Daughter, Son    Stroke Father, Sister          Tobacco Use    Smoking status: Never    Smokeless tobacco: Never   Substance and Sexual Activity    Alcohol use: Yes     Comment: rare, maybe holidays    Drug use: Not Currently     Types: Cocaine    Sexual activity: Not Currently     Partners: Female     Review of Systems   Constitutional: Negative for diaphoresis, malaise/fatigue, weight gain and weight loss.   HENT:  Negative for congestion and nosebleeds.    Cardiovascular:  Positive for chest pain. Negative for claudication, cyanosis, dyspnea on exertion, irregular heartbeat, leg swelling, near-syncope, orthopnea, palpitations, paroxysmal nocturnal dyspnea and syncope.   Respiratory:  Positive for shortness of breath. Negative for cough, hemoptysis, sleep disturbances due to breathing, snoring, sputum production and wheezing.    Hematologic/Lymphatic: Negative for bleeding problem. Does not bruise/bleed easily.   Skin:  Negative for rash.   Musculoskeletal:  Negative for arthritis, back pain, falls, joint pain, muscle cramps and muscle weakness.   Gastrointestinal:  Negative for abdominal pain, constipation, diarrhea,  heartburn, hematemesis, hematochezia, melena, nausea and vomiting.   Genitourinary:  Negative for dysuria, hematuria and nocturia.   Neurological:  Negative for excessive daytime sleepiness, dizziness, headaches, light-headedness, loss of balance, numbness, vertigo and weakness.   Objective:     Vital Signs (Most Recent):  Temp: 97.7 °F (36.5 °C) (09/24/22 1522)  Pulse: 74 (09/24/22 1622)  Resp: 18 (09/24/22 1622)  BP: 103/61 (09/24/22 1522)  SpO2: 95 % (09/24/22 1622) Vital Signs (24h Range):  Temp:  [97.7 °F (36.5 °C)-98.9 °F (37.2 °C)] 97.7 °F (36.5 °C)  Pulse:  [74-90] 74  Resp:  [12-20] 18  SpO2:  [94 %-100 %] 95 %  BP: ()/(43-69) 103/61     Weight: 82 kg (180 lb 12.4 oz)  Body mass index is 30.08 kg/m².    SpO2: 95 %  O2 Device (Oxygen Therapy): room air      Intake/Output Summary (Last 24 hours) at 9/24/2022 1629  Last data filed at 9/24/2022 1400  Gross per 24 hour   Intake 360 ml   Output 500 ml   Net -140 ml       Lines/Drains/Airways       Peripheral Intravenous Line  Duration                  Peripheral IV - Single Lumen 09/23/22 2050 20 G Right Antecubital <1 day                    Physical Exam  Vitals and nursing note reviewed.   Constitutional:       Appearance: Normal appearance. He is well-developed.   HENT:      Head: Normocephalic.      Mouth/Throat:      Mouth: Mucous membranes are moist.   Neck:      Vascular: No carotid bruit or JVD.   Cardiovascular:      Rate and Rhythm: Normal rate and regular rhythm.      Pulses: Normal pulses.      Heart sounds: Normal heart sounds. No murmur heard.    No friction rub.   Pulmonary:      Effort: Pulmonary effort is normal. No respiratory distress.      Breath sounds: Normal breath sounds. No wheezing or rales.   Abdominal:      General: Bowel sounds are normal. There is no distension.      Palpations: Abdomen is soft.      Tenderness: There is no abdominal tenderness. There is no guarding.   Musculoskeletal:         General: No swelling or  tenderness.      Cervical back: Neck supple. No tenderness.      Right lower leg: No edema.      Left lower leg: No edema.   Skin:     General: Skin is warm and dry.      Capillary Refill: Capillary refill takes less than 2 seconds.      Findings: No rash.   Neurological:      General: No focal deficit present.      Mental Status: He is alert and oriented to person, place, and time.   Psychiatric:         Mood and Affect: Mood normal.         Behavior: Behavior normal.         Thought Content: Thought content normal.       Significant Labs: BMP:   Recent Labs   Lab 09/23/22 2054 09/24/22  0446    107    140   K 3.1* 3.6    103   CO2 24 25   BUN 21 22   CREATININE 2.0* 1.6*   CALCIUM 8.0* 8.2*   , CMP   Recent Labs   Lab 09/23/22 2054 09/24/22 0446    140   K 3.1* 3.6    103   CO2 24 25    107   BUN 21 22   CREATININE 2.0* 1.6*   CALCIUM 8.0* 8.2*   PROT 7.0 7.0   ALBUMIN 3.2* 3.2*   BILITOT 0.4 0.3   ALKPHOS 79 77   AST 25 22   ALT 12 16   ANIONGAP 14 12   , CBC   Recent Labs   Lab 09/23/22 2054 09/24/22 0446   WBC 7.56 5.86   HGB 12.5* 11.9*   HCT 39.9* 38.6*    208   , Lipid Panel No results for input(s): CHOL, HDL, LDLCALC, TRIG, CHOLHDL in the last 48 hours., and Troponin   Recent Labs   Lab 09/24/22  0111 09/24/22  0719 09/24/22  1344   TROPONINI 0.102* 0.090* 0.086*       Significant Imaging: Echocardiogram: 2D echo with color flow doppler: No results found. However, due to the size of the patient record, not all encounters were searched. Please check Results Review for a complete set of results. and Transthoracic echo (TTE) complete (Cupid Only):   Results for orders placed or performed during the hospital encounter of 07/30/22   Echo   Result Value Ref Range    BSA 2.03 m2    TDI SEPTAL 0.04 m/s    LV LATERAL E/E' RATIO 26.00 m/s    LV SEPTAL E/E' RATIO 32.50 m/s    LA WIDTH 3.60 cm    IVC diameter 2.26 cm    Left Ventricular Outflow Tract Mean Velocity  0.32645066999528 cm/s    Left Ventricular Outflow Tract Mean Gradient 2.00 mmHg    TDI LATERAL 0.05 m/s    LVIDd 4.08 3.5 - 6.0 cm    IVS 2.06 (A) 0.6 - 1.1 cm    Posterior Wall 1.93 (A) 0.6 - 1.1 cm    Ao root annulus 2.90 cm    LVIDs 3.20 2.1 - 4.0 cm    FS 22 28 - 44 %    LA volume 80.06 cm3    STJ 2.86 cm    Ascending aorta 3.13 cm    LV mass 381.36 g    LA size 4.71 cm    Left Ventricle Relative Wall Thickness 0.95 cm    AV mean gradient 9 mmHg    AV valve area 1.66 cm2    AV Velocity Ratio 0.46     AV index (prosthetic) 0.45     MV valve area p 1/2 method 3.83 cm2    E/A ratio 2.89     Mean e' 0.05 m/s    E wave deceleration time 198.362299802598908 msec    IVRT 59.732622304651524 msec    LVOT diameter 2.17 cm    LVOT area 3.7 cm2    LVOT peak mathew 0.91 m/s    LVOT peak VTI 18.60 cm    Ao peak mathew 2.00 m/s    Ao VTI 41.3 cm    RVOT peak mathew 0.52 m/s    RVOT peak VTI 9.6 cm    Mr max mathew 4.25 m/s    LVOT stroke volume 68.75 cm3    AV peak gradient 16 mmHg    PV mean gradient 0.64 mmHg    E/E' ratio 28.89 m/s    MV Peak E Mathew 1.30 m/s    TR Max Mathew 2.76 m/s    MV stenosis pressure 1/2 time 57.270141994947454 ms    MV Peak A Mathew 0.45 m/s    LV Systolic Volume 40.99 mL    LV Systolic Volume Index 20.8 mL/m2    LV Diastolic Volume 73.19 mL    LV Diastolic Volume Index 37.15 mL/m2    LA Volume Index 40.6 mL/m2    LV Mass Index 194 g/m2    RA Major Axis 4.41 cm    Left Atrium Minor Axis 5.57 cm    Left Atrium Major Axis 5.54 cm    Triscuspid Valve Regurgitation Peak Gradient 30 mmHg    Right Atrial Pressure (from IVC) 15 mmHg    EF 40 %    TV rest pulmonary artery pressure 45 mmHg    Narrative    · The left ventricle is normal in size with severe concentric hypertrophy   and mildly decreased systolic function.  · Mild left atrial enlargement.  · The estimated ejection fraction is 40 - 45%.  · Grade III left ventricular diastolic dysfunction.  · Normal right ventricular size with mildly reduced right ventricular    systolic function.  · Mild tricuspid regurgitation.  · Mild pulmonic regurgitation.  · Elevated central venous pressure (15 mmHg).  · The estimated PA systolic pressure is 45 mmHg.  · There is pulmonary hypertension.

## 2022-09-25 VITALS
TEMPERATURE: 97 F | SYSTOLIC BLOOD PRESSURE: 148 MMHG | HEIGHT: 65 IN | HEART RATE: 71 BPM | DIASTOLIC BLOOD PRESSURE: 75 MMHG | BODY MASS INDEX: 30.49 KG/M2 | RESPIRATION RATE: 18 BRPM | OXYGEN SATURATION: 96 % | WEIGHT: 183 LBS

## 2022-09-25 LAB
ALBUMIN SERPL BCP-MCNC: 2.9 G/DL (ref 3.5–5.2)
ALP SERPL-CCNC: 73 U/L (ref 55–135)
ALT SERPL W/O P-5'-P-CCNC: 11 U/L (ref 10–44)
ANION GAP SERPL CALC-SCNC: 7 MMOL/L (ref 8–16)
AST SERPL-CCNC: 16 U/L (ref 10–40)
BASOPHILS # BLD AUTO: 0.01 K/UL (ref 0–0.2)
BASOPHILS NFR BLD: 0.2 % (ref 0–1.9)
BILIRUB SERPL-MCNC: 0.2 MG/DL (ref 0.1–1)
BUN SERPL-MCNC: 20 MG/DL (ref 8–23)
CALCIUM SERPL-MCNC: 8.4 MG/DL (ref 8.7–10.5)
CHLORIDE SERPL-SCNC: 105 MMOL/L (ref 95–110)
CO2 SERPL-SCNC: 29 MMOL/L (ref 23–29)
CREAT SERPL-MCNC: 1.3 MG/DL (ref 0.5–1.4)
DIFFERENTIAL METHOD: ABNORMAL
EOSINOPHIL # BLD AUTO: 0.1 K/UL (ref 0–0.5)
EOSINOPHIL NFR BLD: 1.7 % (ref 0–8)
ERYTHROCYTE [DISTWIDTH] IN BLOOD BY AUTOMATED COUNT: 15.6 % (ref 11.5–14.5)
EST. GFR  (NO RACE VARIABLE): >60 ML/MIN/1.73 M^2
GLUCOSE SERPL-MCNC: 142 MG/DL (ref 70–110)
HCT VFR BLD AUTO: 36.5 % (ref 40–54)
HGB BLD-MCNC: 11.2 G/DL (ref 14–18)
IMM GRANULOCYTES # BLD AUTO: 0.02 K/UL (ref 0–0.04)
IMM GRANULOCYTES NFR BLD AUTO: 0.3 % (ref 0–0.5)
LYMPHOCYTES # BLD AUTO: 1.7 K/UL (ref 1–4.8)
LYMPHOCYTES NFR BLD: 25.9 % (ref 18–48)
MAGNESIUM SERPL-MCNC: 2.1 MG/DL (ref 1.6–2.6)
MCH RBC QN AUTO: 26 PG (ref 27–31)
MCHC RBC AUTO-ENTMCNC: 30.7 G/DL (ref 32–36)
MCV RBC AUTO: 85 FL (ref 82–98)
MONOCYTES # BLD AUTO: 0.5 K/UL (ref 0.3–1)
MONOCYTES NFR BLD: 7.5 % (ref 4–15)
NEUTROPHILS # BLD AUTO: 4.3 K/UL (ref 1.8–7.7)
NEUTROPHILS NFR BLD: 64.4 % (ref 38–73)
NRBC BLD-RTO: 0 /100 WBC
PLATELET # BLD AUTO: 190 K/UL (ref 150–450)
PMV BLD AUTO: 10.6 FL (ref 9.2–12.9)
POCT GLUCOSE: 151 MG/DL (ref 70–110)
POCT GLUCOSE: 181 MG/DL (ref 70–110)
POTASSIUM SERPL-SCNC: 3.5 MMOL/L (ref 3.5–5.1)
PROT SERPL-MCNC: 6.8 G/DL (ref 6–8.4)
RBC # BLD AUTO: 4.3 M/UL (ref 4.6–6.2)
SODIUM SERPL-SCNC: 141 MMOL/L (ref 136–145)
TROPONIN I SERPL DL<=0.01 NG/ML-MCNC: 0.07 NG/ML (ref 0–0.03)
WBC # BLD AUTO: 6.64 K/UL (ref 3.9–12.7)

## 2022-09-25 PROCEDURE — 99226 PR SUBSEQUENT OBSERVATION CARE,LEVEL III: CPT | Mod: 25,,, | Performed by: STUDENT IN AN ORGANIZED HEALTH CARE EDUCATION/TRAINING PROGRAM

## 2022-09-25 PROCEDURE — 25000003 PHARM REV CODE 250: Performed by: STUDENT IN AN ORGANIZED HEALTH CARE EDUCATION/TRAINING PROGRAM

## 2022-09-25 PROCEDURE — 94640 AIRWAY INHALATION TREATMENT: CPT | Mod: XB

## 2022-09-25 PROCEDURE — 99226 PR SUBSEQUENT OBSERVATION CARE,LEVEL III: ICD-10-PCS | Mod: 25,,, | Performed by: STUDENT IN AN ORGANIZED HEALTH CARE EDUCATION/TRAINING PROGRAM

## 2022-09-25 PROCEDURE — 97530 THERAPEUTIC ACTIVITIES: CPT

## 2022-09-25 PROCEDURE — 93010 ELECTROCARDIOGRAM REPORT: CPT | Mod: ,,, | Performed by: STUDENT IN AN ORGANIZED HEALTH CARE EDUCATION/TRAINING PROGRAM

## 2022-09-25 PROCEDURE — 83735 ASSAY OF MAGNESIUM: CPT | Performed by: INTERNAL MEDICINE

## 2022-09-25 PROCEDURE — 93010 EKG 12-LEAD: ICD-10-PCS | Mod: ,,, | Performed by: STUDENT IN AN ORGANIZED HEALTH CARE EDUCATION/TRAINING PROGRAM

## 2022-09-25 PROCEDURE — 25000003 PHARM REV CODE 250: Performed by: NURSE PRACTITIONER

## 2022-09-25 PROCEDURE — 25000003 PHARM REV CODE 250: Performed by: INTERNAL MEDICINE

## 2022-09-25 PROCEDURE — 97165 OT EVAL LOW COMPLEX 30 MIN: CPT

## 2022-09-25 PROCEDURE — 84484 ASSAY OF TROPONIN QUANT: CPT | Performed by: INTERNAL MEDICINE

## 2022-09-25 PROCEDURE — 85025 COMPLETE CBC W/AUTO DIFF WBC: CPT | Performed by: NURSE PRACTITIONER

## 2022-09-25 PROCEDURE — 94761 N-INVAS EAR/PLS OXIMETRY MLT: CPT

## 2022-09-25 PROCEDURE — 99900035 HC TECH TIME PER 15 MIN (STAT)

## 2022-09-25 PROCEDURE — 27000221 HC OXYGEN, UP TO 24 HOURS

## 2022-09-25 PROCEDURE — 80053 COMPREHEN METABOLIC PANEL: CPT | Performed by: NURSE PRACTITIONER

## 2022-09-25 PROCEDURE — 97161 PT EVAL LOW COMPLEX 20 MIN: CPT

## 2022-09-25 PROCEDURE — G0378 HOSPITAL OBSERVATION PER HR: HCPCS

## 2022-09-25 PROCEDURE — 25000242 PHARM REV CODE 250 ALT 637 W/ HCPCS: Performed by: INTERNAL MEDICINE

## 2022-09-25 PROCEDURE — 93005 ELECTROCARDIOGRAM TRACING: CPT

## 2022-09-25 PROCEDURE — 36415 COLL VENOUS BLD VENIPUNCTURE: CPT | Performed by: NURSE PRACTITIONER

## 2022-09-25 RX ORDER — FUROSEMIDE 40 MG/1
40 TABLET ORAL 2 TIMES DAILY
Qty: 60 TABLET | Refills: 3
Start: 2022-09-25 | End: 2022-10-24

## 2022-09-25 RX ORDER — NITROGLYCERIN 20 MG/1
1 PATCH TRANSDERMAL DAILY
Qty: 30 PATCH | Refills: 0 | Status: SHIPPED | OUTPATIENT
Start: 2022-09-26 | End: 2022-10-24

## 2022-09-25 RX ORDER — AMLODIPINE BESYLATE 5 MG/1
5 TABLET ORAL DAILY
Qty: 30 TABLET | Refills: 0 | Status: SHIPPED | OUTPATIENT
Start: 2022-09-26 | End: 2022-10-24

## 2022-09-25 RX ADMIN — ASPIRIN 81 MG: 81 TABLET, COATED ORAL at 08:09

## 2022-09-25 RX ADMIN — NITROGLYCERIN 1 PATCH: 0.1 PATCH TRANSDERMAL at 08:09

## 2022-09-25 RX ADMIN — PANTOPRAZOLE SODIUM 40 MG: 40 TABLET, DELAYED RELEASE ORAL at 08:09

## 2022-09-25 RX ADMIN — ALLOPURINOL 100 MG: 100 TABLET ORAL at 08:09

## 2022-09-25 RX ADMIN — APIXABAN 5 MG: 2.5 TABLET, FILM COATED ORAL at 08:09

## 2022-09-25 RX ADMIN — LEVALBUTEROL HYDROCHLORIDE 0.63 MG: 0.63 SOLUTION RESPIRATORY (INHALATION) at 04:09

## 2022-09-25 RX ADMIN — HYDROCODONE BITARTRATE AND ACETAMINOPHEN 1 TABLET: 5; 325 TABLET ORAL at 04:09

## 2022-09-25 RX ADMIN — ARFORMOTEROL TARTRATE 15 MCG: 15 SOLUTION RESPIRATORY (INHALATION) at 07:09

## 2022-09-25 RX ADMIN — BUDESONIDE 0.5 MG: 0.5 INHALANT ORAL at 07:09

## 2022-09-25 RX ADMIN — GABAPENTIN 100 MG: 100 CAPSULE ORAL at 08:09

## 2022-09-25 RX ADMIN — LEVALBUTEROL HYDROCHLORIDE 0.63 MG: 0.63 SOLUTION RESPIRATORY (INHALATION) at 07:09

## 2022-09-25 RX ADMIN — AMLODIPINE BESYLATE 2.5 MG: 2.5 TABLET ORAL at 08:09

## 2022-09-25 RX ADMIN — DOCUSATE SODIUM AND SENNOSIDES 1 TABLET: 8.6; 5 TABLET, FILM COATED ORAL at 08:09

## 2022-09-25 RX ADMIN — RANOLAZINE 1000 MG: 500 TABLET, EXTENDED RELEASE ORAL at 08:09

## 2022-09-25 NOTE — PLAN OF CARE
P.T. EVAL COMPLETE.  PT CURRENTLY REQUIRES modified independent with bed mobility and CGA with sit to stand and gait with RW for 200 ft.  P.T. RECOMMENDS home with family  AT D/C

## 2022-09-25 NOTE — NURSING
Administered Hydrocodone -acetaminophen 5/325mg per NP/PRN order for chest pain, also received new order for STAT EKG.

## 2022-09-25 NOTE — PT/OT/SLP EVAL
Physical Therapy Evaluation    Patient Name:  Crow Green   MRN:  [id number]    Recommendations:     Discharge Recommendations:  home   Discharge Equipment Recommendations: walker, rolling, wheelchair, cane, straight   Barriers to discharge: None    Assessment:     Crow Green is a 67 y.o. male admitted with a medical diagnosis of Chest pain.  He presents with the following impairments/functional limitations:  weakness, gait instability, impaired functional mobility .    Rehab Prognosis: Good; patient would benefit from acute skilled PT services to address these deficits and reach maximum level of function.    Recent Surgery: * No surgery found *      Plan:     During this hospitalization, patient to be seen 3 x/week to address the identified rehab impairments via gait training, therapeutic activities, therapeutic exercises and progress toward the following goals:    Plan of Care Expires:  10/09/22    Subjective     Chief Complaint: tired;pain in shoulder/chest improving  Patient/Family Comments/goals: return home with father  Pain/Comfort:  Pain Rating 1: 5/10 (low back, L shoulder and chest)  Pain Addressed 1: Reposition (reduced with repositioning)    Patients cultural, spiritual, Baptism conflicts given the current situation: yes    Living Environment:  One level home; no steps, lives with father. Pt doesn't drive.  Prior to admission, patients level of function was RW and W/C for community mobility but no AD when walking in the home.  Equipment used at home: none (states he uses RW for out of home or w/c for Dr appt).  DME owned (not currently used): none.  Upon discharge, patient will have assistance from family and possibly home PT.    Objective:     Communicated with Cheryl, nurse prior to session.  Patient found  prone in bed watching TV on cell phone  with peripheral IV  upon PT entry to room.    General Precautions: Standard, fall   Orthopedic Precautions:N/A   Braces: N/A  Respiratory  Status: Room air    Exams:  RLE ROM: WFL  RLE Strength: grossly 4/5  LLE ROM: WFL  LLE Strength: grossly 4/5    Functional Mobility:  Bed Mobility:     Rolling Left:  modified independence  Supine to Sit: modified independence  Transfers:     Sit to Stand:  contact guard assistance with rolling walker  Gait: 200 ft with RW with CGA for safety  Balance: fair + standing, good seated    Therapeutic Activities and Exercises:   PT assessed pt level of function with gait and transfers then Dr arrived, limiting PT session.    AM-PAC 6 CLICK MOBILITY  Total Score:18     Patient left supine with all lines intact, call button in reach, bed alarm off, Cheryl, nurse notified, and Dr  present.    GOALS:   Multidisciplinary Problems       Physical Therapy Goals          Problem: Physical Therapy    Goal Priority Disciplines Outcome Goal Variances Interventions   Physical Therapy Goal     PT, PT/OT      Description: LTG'S TO BE MET IN 14 DAYS (10/9/22)  PT WILL REQUIRE modified independence FOR BED MOBILITY  PT WILL REQUIRE modified independent FOR BED<>CHAIR TF'S  PT WILL  FEET WITH RW AND modified independent                        History:     Past Medical History:   Diagnosis Date    Arthritis     Asthma     Atrial fibrillation     Atrial fibrillation with RVR 8/7/2019    CHF (congestive heart failure)     Chronic obstructive pulmonary disease 8/5/2020    Depression     Dermatomyositis     Diabetes mellitus, type 2 Diagnosed in 2000    Elevated PSA     Follow up 7/30/2020    Hypertension     Myocardial infarction     2017    Sleep apnea        Past Surgical History:   Procedure Laterality Date    ABLATION OF ARRHYTHMOGENIC FOCUS FOR ATRIAL FIBRILLATION N/A 2/27/2020    Procedure: Ablation atrial fibrillation;  Surgeon: Gio Brown MD;  Location: [facility] EP LAB;  Service: Cardiology;  Laterality: N/A;  afib, DAMIEN (cx if SR), PVI, PITO, anes, MB, 3 Prep    CHOLECYSTECTOMY      CLOSURE OF WOUND Left 7/1/2020     Procedure: CLOSURE, WOUND;  Surgeon: Barb Veras DPM;  Location: [facility] OR;  Service: Podiatry;  Laterality: Left;    COLONOSCOPY N/A 3/4/2022    Procedure: COLONOSCOPY;  Surgeon: Yolis Freitas MD;  Location: [facility];  Service: Endoscopy;  Laterality: N/A;    ESOPHAGOGASTRODUODENOSCOPY N/A 3/4/2022    Procedure: EGD (ESOPHAGOGASTRODUODENOSCOPY);  Surgeon: Yolis Freitas MD;  Location: [facility];  Service: Endoscopy;  Laterality: N/A;    INJECTION OF ANESTHETIC AGENT INTO SACROILIAC JOINT Left 3/24/2021    Procedure: Left BLOCK, SACROILIAC JOINT and bilateral rhomboid TPI;  Surgeon: Dillan Tsai MD;  Location: [facility] PAIN MGT;  Service: Pain Management;  Laterality: Left;    INTRALUMINAL GASTROINTESTINAL TRACT IMAGING VIA CAPSULE N/A 3/22/2022    Procedure: IMAGING PROCEDURE, GI TRACT, INTRALUMINAL, VIA CAPSULE;  Surgeon: Justice Martinez RN;  Location: [facility];  Service: Endoscopy;  Laterality: N/A;    REVERSE TOTAL SHOULDER ARTHROPLASTY Left 1/10/2022    Procedure: ARTHROPLASTY, SHOULDER, TOTAL, REVERSE;  Surgeon: Anthony Alvarado MD;  Location: [facility];  Service: Orthopedics;  Laterality: Left;  left reverse total shoulder arthroplasty     SELECTIVE INJECTION OF ANESTHETIC AGENT AROUND LUMBAR SPINAL NERVE ROOT BY TRANSFORAMINAL APPROACH Left 6/11/2020    Procedure: BLOCK, SPINAL NERVE ROOT, LUMBAR, SELECTIVE, TRANSFORAMINAL APPROACH Left L4-5, L5-S1 TFESI with RN IV sedation;  Surgeon: Dillan Tsai MD;  Location: [facility] PAIN MGT;  Service: Pain Management;  Laterality: Left;    TOE AMPUTATION Left 6/29/2020    Procedure: AMPUTATION, TOE;  Surgeon: Barb Veras DPM;  Location: [facility] OR;  Service: Podiatry;  Laterality: Left;  great toe       Time Tracking:     PT Received On: 09/25/22  PT Start Time: 0937     PT Stop Time: 0950  PT Total Time (min): 13 min     Billable Minutes: Evaluation 13      09/25/2022

## 2022-09-25 NOTE — PT/OT/SLP EVAL
Occupational Therapy   Evaluation and Discharge Note    Name: Crow Green  MRN: [id number]  Admitting Diagnosis:  Chest pain   Recent Surgery: * No surgery found *      Recommendations:     Discharge Recommendations: home  Discharge Equipment Recommendations:  none  Barriers to discharge:       Assessment:     Crow Green is a 67 y.o. male with a medical diagnosis of Chest pain. At this time, patient is functioning at their prior level of function and does not require further acute OT services.     Plan:     During this hospitalization, patient does not require further acute OT services.  Please re-consult if situation changes.    Plan of Care Reviewed with: patient    Subjective     Chief Complaint:   Patient/Family Comments/goals:     Occupational Profile:  Living Environment: LIVES WITH FATHER IN 1 STORY HOUSE AND NO STEPS  Previous level of function: MOD (I) WITH FUNCTIONAL  MOBILITY/ TRANSFERS   Roles and Routines: OCCUPATIONAL THERAPY  Equipment Used at home:  none  Assistance upon Discharge:     Pain/Comfort:  Pain Rating 1: 0/10    Patients cultural, spiritual, Jehovah's witness conflicts given the current situation:      Objective:     Communicated with: NURSE AND Epic CHART REVIEW prior to session.  Patient found up in chair with   upon OT entry to room.    General Precautions: Standard,     Orthopedic Precautions:N/A   Braces: N/A  Respiratory Status: Room air     Occupational Performance:    Bed Mobility:    Patient completed Rolling/Turning to Right with independence  Patient completed Scooting/Bridging with independence  Patient completed Supine to Sit with independence    Functional Mobility/Transfers:  Patient completed Sit <> Stand Transfer with independence  with  hand-held assist   Patient completed Bed <> Chair Transfer using Step Transfer technique with independence with no assistive device  Functional Mobility: PT AMBULATED 175 FEET  WITH MOD (I) WITH ROLLING WALKER    Activities of  Daily Living:  Upper Body Dressing: independence .  Lower Body Dressing: independence .    Cognitive/Visual Perceptual:  Cognitive/Psychosocial Skills:     -       Oriented to: Person, Place, Time, and Situation   -       Follows Commands/attention:Follows multistep  commands  -       Communication: clear/fluent  -       Memory: No Deficits noted  -       Safety awareness/insight to disability: intact   Visual/Perceptual:      -Intact .    Physical Exam:  Upper Extremity Range of Motion:     -       Right Upper Extremity: WFL  -       Left Upper Extremity: LIMITED SHOULDER FLEXION APPROX 90 DEGREES PLOF  Upper Extremity Strength:    -       Right Upper Extremity: WFL  -       Left Upper Extremity: MMT:3+/5 GROSSLY   Strength:    -       Right Upper Extremity: WFL  -       Left Upper Extremity: WFL    AMPAC 6 Click ADL:  AMPAC Total Score: 24    Treatment & Education:  Patient educated on role of OT in acute setting and benefits of OOB activity. Encouraged OOB throughout the course of hospitalization to decrease risk of hospital related debility. Patient stated understanding and in agreement with POC.     Patient left sitting edge of bed with all lines intact, call button in reach, MD ALMONTE notified, and MD LAUREN present    GOALS:   Multidisciplinary Problems       Occupational Therapy Goals       Not on file                    History:     Past Medical History:   Diagnosis Date    Arthritis     Asthma     Atrial fibrillation     Atrial fibrillation with RVR 8/7/2019    CHF (congestive heart failure)     Chronic obstructive pulmonary disease 8/5/2020    Depression     Dermatomyositis     Diabetes mellitus, type 2 Diagnosed in 2000    Elevated PSA     Follow up 7/30/2020    Hypertension     Myocardial infarction     2017    Sleep apnea          Past Surgical History:   Procedure Laterality Date    ABLATION OF ARRHYTHMOGENIC FOCUS FOR ATRIAL FIBRILLATION N/A 2/27/2020    Procedure: Ablation atrial fibrillation;   Surgeon: Gio Brown MD;  Location: [facility];  Service: Cardiology;  Laterality: N/A;  afib, DAMIEN (cx if SR), PVI, PITO, anes, MB, 3 Prep    CHOLECYSTECTOMY      CLOSURE OF WOUND Left 7/1/2020    Procedure: CLOSURE, WOUND;  Surgeon: Barb Veras DPM;  Location: [facility];  Service: Podiatry;  Laterality: Left;    COLONOSCOPY N/A 3/4/2022    Procedure: COLONOSCOPY;  Surgeon: Yolis Freitas MD;  Location: [facility];  Service: Endoscopy;  Laterality: N/A;    ESOPHAGOGASTRODUODENOSCOPY N/A 3/4/2022    Procedure: EGD (ESOPHAGOGASTRODUODENOSCOPY);  Surgeon: Yolis Freitas MD;  Location: [facility];  Service: Endoscopy;  Laterality: N/A;    INJECTION OF ANESTHETIC AGENT INTO SACROILIAC JOINT Left 3/24/2021    Procedure: Left BLOCK, SACROILIAC JOINT and bilateral rhomboid TPI;  Surgeon: Dillan Tsai MD;  Location: [facility];  Service: Pain Management;  Laterality: Left;    INTRALUMINAL GASTROINTESTINAL TRACT IMAGING VIA CAPSULE N/A 3/22/2022    Procedure: IMAGING PROCEDURE, GI TRACT, INTRALUMINAL, VIA CAPSULE;  Surgeon: Justice Martinez RN;  Location: [facility];  Service: Endoscopy;  Laterality: N/A;    REVERSE TOTAL SHOULDER ARTHROPLASTY Left 1/10/2022    Procedure: ARTHROPLASTY, SHOULDER, TOTAL, REVERSE;  Surgeon: Anthony Alvarado MD;  Location: [facility];  Service: Orthopedics;  Laterality: Left;  left reverse total shoulder arthroplasty     SELECTIVE INJECTION OF ANESTHETIC AGENT AROUND LUMBAR SPINAL NERVE ROOT BY TRANSFORAMINAL APPROACH Left 6/11/2020    Procedure: BLOCK, SPINAL NERVE ROOT, LUMBAR, SELECTIVE, TRANSFORAMINAL APPROACH Left L4-5, L5-S1 TFESI with RN IV sedation;  Surgeon: Dillan Tsai MD;  Location: [facility];  Service: Pain Management;  Laterality: Left;    TOE AMPUTATION Left 6/29/2020    Procedure: AMPUTATION, TOE;  Surgeon: Barb Veras DPM;  Location: [facility];  Service: Podiatry;  Laterality: Left;  great toe       Time Tracking:     OT Date of Treatment:  09/25/22  OT Start Time: 0940  OT Stop Time: 1005  OT Total Time (min): 25 min    Billable Minutes:Evaluation 15 MINUTES  Therapeutic Activity 10 MINUTES    9/25/2022

## 2022-09-25 NOTE — NURSING
Assumed care of pt, received in prone position, appears to be asleep, easily aroused, denies any pain or discomfort at this time, assessment per flowsheet. Bed low, call light within reach. Will continue to monitor.

## 2022-09-25 NOTE — PLAN OF CARE
[facility] - Telemetry (Hospital)  Discharge Final Note    Primary Care Provider: Eunice Mora NP    Expected Discharge Date: 9/25/2022    Final Discharge Note (most recent)       Final Note - 09/25/22 0916          Final Note    Assessment Type Final Discharge Note        Post-Acute Status    Discharge Delays None known at this time                   Referral for  [facility] to [facility] per patient. KM,MSW    Important Message from Medicare

## 2022-09-26 ENCOUNTER — OFFICE VISIT (OUTPATIENT)
Dept: INTERNAL MEDICINE | Facility: CLINIC | Age: 67
End: 2022-09-26
Payer: MEDICARE

## 2022-09-26 VITALS
TEMPERATURE: 97 F | DIASTOLIC BLOOD PRESSURE: 78 MMHG | BODY MASS INDEX: 31.74 KG/M2 | HEART RATE: 80 BPM | WEIGHT: 190.5 LBS | OXYGEN SATURATION: 95 % | HEIGHT: 65 IN | SYSTOLIC BLOOD PRESSURE: 128 MMHG

## 2022-09-26 DIAGNOSIS — I25.110 ATHEROSCLEROSIS OF NATIVE CORONARY ARTERY OF NATIVE HEART WITH UNSTABLE ANGINA PECTORIS: ICD-10-CM

## 2022-09-26 DIAGNOSIS — I10 ESSENTIAL HYPERTENSION: ICD-10-CM

## 2022-09-26 DIAGNOSIS — Z79.4 TYPE 2 DIABETES MELLITUS WITH MILD NONPROLIFERATIVE RETINOPATHY AND MACULAR EDEMA, WITH LONG-TERM CURRENT USE OF INSULIN, UNSPECIFIED LATERALITY: ICD-10-CM

## 2022-09-26 DIAGNOSIS — Z09 HOSPITAL DISCHARGE FOLLOW-UP: Primary | ICD-10-CM

## 2022-09-26 DIAGNOSIS — E11.3219 TYPE 2 DIABETES MELLITUS WITH MILD NONPROLIFERATIVE RETINOPATHY AND MACULAR EDEMA, WITH LONG-TERM CURRENT USE OF INSULIN, UNSPECIFIED LATERALITY: ICD-10-CM

## 2022-09-26 DIAGNOSIS — I50.42 CHRONIC COMBINED SYSTOLIC AND DIASTOLIC HEART FAILURE: ICD-10-CM

## 2022-09-26 DIAGNOSIS — I12.9 HYPERTENSIVE CHRONIC KIDNEY DISEASE WITH STAGE 1 THROUGH STAGE 4 CHRONIC KIDNEY DISEASE, OR UNSPECIFIED CHRONIC KIDNEY DISEASE: ICD-10-CM

## 2022-09-26 PROCEDURE — 1159F PR MEDICATION LIST DOCUMENTED IN MEDICAL RECORD: ICD-10-PCS | Mod: CPTII,S$GLB,, | Performed by: NURSE PRACTITIONER

## 2022-09-26 PROCEDURE — 4010F ACE/ARB THERAPY RXD/TAKEN: CPT | Mod: CPTII,S$GLB,, | Performed by: NURSE PRACTITIONER

## 2022-09-26 PROCEDURE — 3008F BODY MASS INDEX DOCD: CPT | Mod: CPTII,S$GLB,, | Performed by: NURSE PRACTITIONER

## 2022-09-26 PROCEDURE — 99214 PR OFFICE/OUTPT VISIT, EST, LEVL IV, 30-39 MIN: ICD-10-PCS | Mod: S$GLB,,, | Performed by: NURSE PRACTITIONER

## 2022-09-26 PROCEDURE — 99999 PR PBB SHADOW E&M-EST. PATIENT-LVL V: ICD-10-PCS | Mod: PBBFAC,,, | Performed by: NURSE PRACTITIONER

## 2022-09-26 PROCEDURE — 4010F PR ACE/ARB THEARPY RXD/TAKEN: ICD-10-PCS | Mod: CPTII,S$GLB,, | Performed by: NURSE PRACTITIONER

## 2022-09-26 PROCEDURE — 99999 PR PBB SHADOW E&M-EST. PATIENT-LVL V: CPT | Mod: PBBFAC,,, | Performed by: NURSE PRACTITIONER

## 2022-09-26 PROCEDURE — 3044F HG A1C LEVEL LT 7.0%: CPT | Mod: CPTII,S$GLB,, | Performed by: NURSE PRACTITIONER

## 2022-09-26 PROCEDURE — 99499 UNLISTED E&M SERVICE: CPT | Mod: S$GLB,,, | Performed by: NURSE PRACTITIONER

## 2022-09-26 PROCEDURE — 3074F PR MOST RECENT SYSTOLIC BLOOD PRESSURE < 130 MM HG: ICD-10-PCS | Mod: CPTII,S$GLB,, | Performed by: NURSE PRACTITIONER

## 2022-09-26 PROCEDURE — 99214 OFFICE O/P EST MOD 30 MIN: CPT | Mod: S$GLB,,, | Performed by: NURSE PRACTITIONER

## 2022-09-26 PROCEDURE — 1159F MED LIST DOCD IN RCRD: CPT | Mod: CPTII,S$GLB,, | Performed by: NURSE PRACTITIONER

## 2022-09-26 PROCEDURE — 3078F PR MOST RECENT DIASTOLIC BLOOD PRESSURE < 80 MM HG: ICD-10-PCS | Mod: CPTII,S$GLB,, | Performed by: NURSE PRACTITIONER

## 2022-09-26 PROCEDURE — 3078F DIAST BP <80 MM HG: CPT | Mod: CPTII,S$GLB,, | Performed by: NURSE PRACTITIONER

## 2022-09-26 PROCEDURE — 3044F PR MOST RECENT HEMOGLOBIN A1C LEVEL <7.0%: ICD-10-PCS | Mod: CPTII,S$GLB,, | Performed by: NURSE PRACTITIONER

## 2022-09-26 PROCEDURE — 3008F PR BODY MASS INDEX (BMI) DOCUMENTED: ICD-10-PCS | Mod: CPTII,S$GLB,, | Performed by: NURSE PRACTITIONER

## 2022-09-26 PROCEDURE — 3074F SYST BP LT 130 MM HG: CPT | Mod: CPTII,S$GLB,, | Performed by: NURSE PRACTITIONER

## 2022-09-26 NOTE — DISCHARGE SUMMARY
[facility] - Telemetry ([facility] Medicine  Discharge Summary      Patient Name: Crow Green  MRN: [id number]  Patient Class: OP- Observation  Admission Date: 9/23/2022  Hospital Length of Stay: 0 days  Discharge Date and Time: No discharge date for patient encounter.  Attending Physician: Davina Donovan MD   Discharging Provider: Davina Donovan MD  Primary Care Provider: Eunice Mora NP      HPI:   The patient is a 66 yo male CAD nonobstructive and small vessel Dz per LHC done in 11/16, PAF s/p PVI in  by Dr. Brown, Bilateral carotid stenosis, CHFpEF 45%, DM, HTN, HLD, Asthma/COPD, SANDRITA-on CPAP, and Obesity presented to ED for chest pain. The patient reports he had an appointment at hospital for sleep study tonight. While sitting outside waiting on transport to bring him to hospital, the patient had onset of left sided chest pain rated 8/10 associated with dizziness and generalized weakness. He took 3 NTG with only slight improvement in pain, so he had transport bring him to ED.     In the ED, BP 65/49- improved to 103/55 with 500ml bolus. EKg showed sinus rhythm with 1st degree heart block, LAFB, Nonspecific ST and T wave abnormality. Labs showed K 3.1, Serum Cr 2.0, Troponin 0.087>0.102 (chronically elevated and at baseline), . CXR showed nothing acute.   GI cocktail given with little improvement in symptoms.     Of note, Pt was hospitalized for chest pain 7/31/22. He was treated for CHF at that time and had a Stress test 8/2/22 which was abnormal showing a scar and a small perfusion defect. Pt then followed up with cardiology and had a repeat Stress test 8/16/22 which showed an old scar but showed no perfusion defect and was negative for reversible ischemia. Echo 7/31/22 showed systolic and diastolic dysfunction with EF 40-45%-no WMA.           * No surgery found *      Hospital Course:   9/24- Admitted for further evaluation of chest pain associated with SOB in the setting of  cocaine use. BB held . Troponin elevation is flat. BP is soft. Amlodipine at 2.5 mg resumed. Entresto held . Pt has underlying COPD/ Asthma. Will utilize inhaled bronchodilators. No peripheral edema noted on exam. Hold diuretics given  marginal BP and no signs of fluid overload. Creatinine improved to 1.6.       9/25- Pt still c/o chest pain , however seems more subjective . Flat affect noted. Troponin elevation is flat and trended down. Known h/o non ischemic cardiomyopathy. Strongly advised of complete cessation and abstinence from cocaine use. Case discussed with Cardiology and suggested to hold off on BB, increase Amlodipine to 5 mg daily and hold Entresto for now due to marginal BP and reinitiate as outpatient once BP permits. Continue Ranexa and change Imdur to Nitroglycerin patch . Pt examined and deemed stable for discharge to home with Home Health , home NP visit and follow up with PCP and Cardiologist.          Goals of Care Treatment Preferences:  Code Status: Full Code      Consults:   Consults (From admission, onward)        Status Ordering Provider     Inpatient consult to Social Work  Once        Provider:  (Not yet assigned)    Completed ASIA SANTIAGO     Inpatient consult to Cardiology  Once        Provider:  Floyd Salazar MD    Completed CHANNING HANSEN          No new Assessment & Plan notes have been filed under this hospital service since the last note was generated.  Service: Hospital Medicine    Final Active Diagnoses:    Diagnosis Date Noted POA    PRINCIPAL PROBLEM:  Chest pain [R07.9] 03/15/2017 Yes    Substance abuse(UDS + for cocaine ) [F19.10] 09/24/2022 Yes    PAF (paroxysmal atrial fibrillation) [I48.0] 03/17/2017 Yes     Chronic    Nonobstructive coronary artery disease of native artery of native heart [I25.118] 12/05/2016 Yes     Chronic    Elevated troponin [R77.8] 06/26/2020 Yes    Acute renal failure superimposed on stage 3 chronic kidney disease [N17.9, N18.30]  06/28/2022 Yes    Type 2 diabetes mellitus [E11.9] 12/07/2016 Yes    Hypotension due to medication [I95.2] 07/05/2020 Yes    Bilateral carotid artery stenosis [I65.23] 09/24/2022 Yes    Cardiomyopathy [I42.9]  Yes    JOSE MARIA (acute kidney injury) [N17.9] 08/07/2019 Yes      Problems Resolved During this Admission:       Discharged Condition: stable    Disposition: Home-Health Care [facility]    Follow Up:   Follow-up Information     Eunice Mora NP. Schedule an appointment as soon as possible for a visit in 3 day(s).    Specialty: Family Medicine  Why: Discharge follow up  Contact information:  [address] [address] [id number] [phone]             Eduard Zhao MD. Schedule an appointment as soon as possible for a visit in 1 week(s).    Specialties: Cardiology, Cardiovascular Disease  Why: Cardiology discharge follow up  Contact information:  [address]  [place] [id number] [phone]                       Patient Instructions:      Ambulatory referral/consult to [facility]   Standing Status: Future   Referral Priority: Routine Referral Type: Consultation   Referral Reason: Specialty Services Required   Number of Visits Requested: 1     Ambulatory referral/consult to Home Health   Standing Status: Future   Referral Priority: Routine Referral Type: Home Health   Referral Reason: Specialty Services Required   Requested Specialty: Home Health Services   Number of Visits Requested: 1     Diet diabetic     Activity as tolerated       Significant Diagnostic Studies: Labs:   BMP:   Recent Labs   Lab 09/23/22 2054 09/24/22 0446 09/25/22  0442    107 142*    140 141   K 3.1* 3.6 3.5    103 105   CO2 24 25 29   BUN 21 22 20   CREATININE 2.0* 1.6* 1.3   CALCIUM 8.0* 8.2* 8.4*   MG  --   --  2.1   , CMP   Recent Labs   Lab 09/23/22 2054 09/24/22  0446 09/25/22  0442    140 141   K 3.1* 3.6 3.5    103 105   CO2 24 25 29    107 142*   BUN 21 22 20    CREATININE 2.0* 1.6* 1.3   CALCIUM 8.0* 8.2* 8.4*   PROT 7.0 7.0 6.8   ALBUMIN 3.2* 3.2* 2.9*   BILITOT 0.4 0.3 0.2   ALKPHOS 79 77 73   AST 25 22 16   ALT 12 16 11   ANIONGAP 14 12 7*    and CBC   Recent Labs   Lab 09/23/22 2054 09/24/22 0446 09/25/22 0442   WBC 7.56 5.86 6.64   HGB 12.5* 11.9* 11.2*   HCT 39.9* 38.6* 36.5*    208 190       Pending Diagnostic Studies:     None         Medications:  Reconciled Home Medications:      Medication List      START taking these medications    nitroGLYCERIN 0.1 mg/hr TD PT24 0.1 mg/hr Pt24  Commonly known as: NITRODUR  Place 1 patch onto the skin once daily.  Start taking on: September 26, 2022  Replaces: nitroGLYCERIN 0.4 MG SL tablet        CHANGE how you take these medications    amLODIPine 5 MG tablet  Commonly known as: NORVASC  Take 1 tablet (5 mg total) by mouth once daily.  Start taking on: September 26, 2022  What changed:   · medication strength  · how much to take  · when to take this     furosemide 40 MG tablet  Commonly known as: LASIX  Take 1 tablet (40 mg total) by mouth 2 (two) times a day.  What changed: how much to take        CONTINUE taking these medications    acetaminophen 500 MG tablet  Commonly known as: TYLENOL  Take 2 tablets (1,000 mg total) by mouth every 8 (eight) hours as needed for Pain.     albuterol 2.5 mg /3 mL (0.083 %) nebulizer solution  Commonly known as: PROVENTIL  Take 3 mLs (2.5 mg total) by nebulization every 4 to 6 hours as needed for Wheezing or Shortness of Breath.     allopurinoL 100 MG tablet  Commonly known as: ZYLOPRIM  Take 1 tablet (100 mg total) by mouth once daily.     aspirin 81 MG EC tablet  Commonly known as: ECOTRIN  Take 1 tablet (81 mg total) by mouth once daily.     atorvastatin 40 MG tablet  Commonly known as: LIPITOR  TAKE 1 TABLET BY MOUTH EVERY EVENING     azaTHIOprine 50 mg Tab  Commonly known as: IMURAN  Take 1 tablet (50 mg total) by mouth once daily.     baclofen 10 MG tablet  Commonly known  as: LIORESAL  Take 1 tablet (10 mg total) by mouth once daily.     blood sugar diagnostic Strp  1 each by Misc.(Non-Drug; Combo Route) route 4 (four) times daily.     blood-glucose meter kit  Commonly known as: TRUE METRIX GLUCOSE METER  Use as instructed to test blood glucose meter daily.     ELIQUIS 5 mg Tab  Generic drug: apixaban  Take 1 tablet (5 mg total) by mouth 2 (two) times daily.     fluticasone propionate 50 mcg/actuation nasal spray  Commonly known as: FLONASE  2 sprays (100 mcg total) by Each Nostril route 2 (two) times daily.     fluticasone-salmeterol 250-50 mcg/dose 250-50 mcg/dose diskus inhaler  Commonly known as: ADVAIR DISKUS  Inhale 1 puff into the lungs 2 (two) times daily. Controller     gabapentin 800 MG tablet  Commonly known as: NEURONTIN  Take 1 tablet (800 mg total) by mouth 3 (three) times daily.     insulin aspart U-100 100 unit/mL (3 mL) Inpn pen  Commonly known as: NovoLOG Flexpen U-100 Insulin  INJECT 10 UNITS SUBCUTANEOUSLY THREE TIMES DAILY WITH MEALS     ipratropium 0.02 % nebulizer solution  Commonly known as: ATROVENT  INHALE THE CONTENTS OF 1 VIAL VIA NEBULIZER FOUR TIMES DAILY     JARDIANCE 25 mg tablet  Generic drug: empagliflozin  TAKE 1 TABLET(25 MG) BY MOUTH EVERY DAY     latanoprost 0.005 % ophthalmic solution  INSTILL 1 DROP INTO BOTH EYES ONE TIME DAILY. BOTTLE EXPIRES 42 DAYS AFTER OPENING     levocetirizine 5 MG tablet  Commonly known as: XYZAL  Take 1 tablet (5 mg total) by mouth every evening.     MICRO THIN LANCETS 33 gauge Misc  Generic drug: lancets     OZEMPIC 2 mg/dose (8 mg/3 mL) Pnij  Generic drug: semaglutide  Inject 2 mg into the skin every 7 days.     pantoprazole 40 MG tablet  Commonly known as: PROTONIX  Take 1 tablet (40 mg total) by mouth once daily.     ranolazine 1,000 mg Tb12  Commonly known as: RANEXA  Take 1 tablet (1,000 mg total) by mouth 2 (two) times daily.     sertraline 100 MG tablet  Commonly known as: ZOLOFT  Take 1 tablet (100 mg total)  by mouth every evening.     SPIRIVA WITH HANDIHALER 18 mcg inhalation capsule  Generic drug: tiotropium  INHALE THE CONTENTS OF 1 CAPSULE ONE TIME DAILY (CONTROLLER)     tamsulosin 0.4 mg Cap  Commonly known as: FLOMAX  Take 1 capsule (0.4 mg total) by mouth once daily.     traMADoL 50 mg tablet  Commonly known as: ULTRAM  Take 1 tablet (50 mg total) by mouth every 8 (eight) hours as needed for Pain.     traZODone 100 MG tablet  Commonly known as: DESYREL  Take 2 tablets (200 mg total) by mouth every evening.     triamcinolone acetonide 0.1% 0.1 % ointment  Commonly known as: KENALOG  Apply topically 2 (two) times daily. Use on legs and back     VIOS AEROSOL DELIVERY SYSTEM Shefali  Generic drug: nebulizer and compressor  USE AS DIRESTED        STOP taking these medications    diclofenac sodium 1 % Gel  Commonly known as: VOLTAREN     ENTRESTO 24-26 mg per tablet  Generic drug: sacubitriL-valsartan     isosorbide mononitrate 60 MG 24 hr tablet  Commonly known as: IMDUR     metoprolol succinate 50 MG 24 hr tablet  Commonly known as: TOPROL-XL     nitroGLYCERIN 0.4 MG SL tablet  Commonly known as: NITROSTAT  Replaced by: nitroGLYCERIN 0.1 mg/hr TD PT24 0.1 mg/hr Pt24            Indwelling Lines/Drains at time of discharge:   Lines/Drains/Airways     None                 Time spent on the discharge of patient: 30  minutes         Davina Donovan MD  Department of Hospital Medicine  [facility] - Telemetry ([facility])

## 2022-09-26 NOTE — PROGRESS NOTES
HPI     Chief Complaint  Chief Complaint   Patient presents with    Follow-up       HPI  Crow Green is a 67 y.o. male with multiple medical diagnoses as listed in the medical history and problem list that presents for Hospital Discharge Follow-up. This patient is new to me.     Hospital Discharge Follow-up: Hospitalized x 3 days due to CHF. Reported for a sleep study where he complained of chest pain then referred to ER. Has a planned surgery to placement of a wireless senor for pulmonary arterial pressure monitoring 10/10/2022. Sleep study has been rescheduled for 09/29/2022. Labs at discharge were within acceptable limits. He has a home health nurse that visits for medication management and education. Nurse visits once a week. Currently using Nitroglycerin patch. Reports continued salt intake as he has eaten Ramen noodles since discharge.     Pertinent negatives at this time are chest pain/palpitations/tightness/discomfort, SOB, GI upset, urinary/bowel changes, unexplained weight loss/gain, dizziness/headaches/syncope.     History     PAST MEDICAL HISTORY:  Past Medical History:   Diagnosis Date    Arthritis     Asthma     Atrial fibrillation     Atrial fibrillation with RVR 8/7/2019    CHF (congestive heart failure)     Chronic obstructive pulmonary disease 8/5/2020    Depression     Dermatomyositis     Diabetes mellitus, type 2 Diagnosed in 2000    Elevated PSA     Follow up 7/30/2020    Hypertension     Myocardial infarction     2017    Sleep apnea        PAST SURGICAL HISTORY:  Past Surgical History:   Procedure Laterality Date    ABLATION OF ARRHYTHMOGENIC FOCUS FOR ATRIAL FIBRILLATION N/A 2/27/2020    Procedure: Ablation atrial fibrillation;  Surgeon: Gio Brown MD;  Location: [facility];  Service: Cardiology;  Laterality: N/A;  afib, DAMIEN (cx if SR), PVI, PITO, anes, MB, 3 Prep    CHOLECYSTECTOMY      CLOSURE OF WOUND Left 7/1/2020    Procedure: CLOSURE, WOUND;  Surgeon: Barb Veras,  MONTY;  Location: [facility] OR;  Service: Podiatry;  Laterality: Left;    COLONOSCOPY N/A 3/4/2022    Procedure: COLONOSCOPY;  Surgeon: Yolis Freitas MD;  Location: [facility] ENDO;  Service: Endoscopy;  Laterality: N/A;    ESOPHAGOGASTRODUODENOSCOPY N/A 3/4/2022    Procedure: EGD (ESOPHAGOGASTRODUODENOSCOPY);  Surgeon: Yolis Freitas MD;  Location: [facility] ENDO;  Service: Endoscopy;  Laterality: N/A;    INJECTION OF ANESTHETIC AGENT INTO SACROILIAC JOINT Left 3/24/2021    Procedure: Left BLOCK, SACROILIAC JOINT and bilateral rhomboid TPI;  Surgeon: Dillan Tsai MD;  Location: [facility] PAIN MGT;  Service: Pain Management;  Laterality: Left;    INTRALUMINAL GASTROINTESTINAL TRACT IMAGING VIA CAPSULE N/A 3/22/2022    Procedure: IMAGING PROCEDURE, GI TRACT, INTRALUMINAL, VIA CAPSULE;  Surgeon: Justice Martinez RN;  Location: [facility] ENDO;  Service: Endoscopy;  Laterality: N/A;    REVERSE TOTAL SHOULDER ARTHROPLASTY Left 1/10/2022    Procedure: ARTHROPLASTY, SHOULDER, TOTAL, REVERSE;  Surgeon: Anthony Alvarado MD;  Location: [facility];  Service: Orthopedics;  Laterality: Left;  left reverse total shoulder arthroplasty     SELECTIVE INJECTION OF ANESTHETIC AGENT AROUND LUMBAR SPINAL NERVE ROOT BY TRANSFORAMINAL APPROACH Left 6/11/2020    Procedure: BLOCK, SPINAL NERVE ROOT, LUMBAR, SELECTIVE, TRANSFORAMINAL APPROACH Left L4-5, L5-S1 TFESI with RN IV sedation;  Surgeon: Dillan Tsai MD;  Location: [facility] PAIN MGT;  Service: Pain Management;  Laterality: Left;    TOE AMPUTATION Left 6/29/2020    Procedure: AMPUTATION, TOE;  Surgeon: Barb Veras DPM;  Location: [facility];  Service: Podiatry;  Laterality: Left;  great toe       SOCIAL HISTORY:  Social History     Socioeconomic History    Marital status: Legally     Number of children: 5   Occupational History    Occupation: disabled    Occupation: PT at [employer]    Tobacco Use    Smoking status: Never    Smokeless tobacco: Never   Substance and Sexual  Activity    Alcohol use: Yes     Comment: rare, maybe holidays    Drug use: Not Currently     Types: Cocaine    Sexual activity: Not Currently     Partners: Female   Social History Narrative    Utilizing [employer] transportation which has been unreliable.        FAMILY HISTORY:  Family History   Problem Relation Age of Onset    Glaucoma Mother     Cataracts Mother     Diabetes Mother     Cataracts Father     Stroke Father     Glaucoma Sister         x3    Cataracts Sister         x3    Diabetes Sister         x1    Stroke Sister         x1    Glaucoma Maternal Aunt     Diabetes Maternal Aunt     No Known Problems Brother     No Known Problems Daughter     No Known Problems Son     Cancer Maternal Grandfather         lung (smoker)    Prostate cancer Neg Hx     Heart disease Neg Hx     Kidney disease Neg Hx        ALLERGIES AND MEDICATIONS: updated and reviewed.  Review of patient's allergies indicates:   Allergen Reactions    Protamine Hives     Urticaria, possible upper airway swelling     Current Outpatient Medications   Medication Sig Dispense Refill    acetaminophen (TYLENOL) 500 MG tablet Take 2 tablets (1,000 mg total) by mouth every 8 (eight) hours as needed for Pain. 60 tablet 0    albuterol (PROVENTIL) 2.5 mg /3 mL (0.083 %) nebulizer solution Take 3 mLs (2.5 mg total) by nebulization every 4 to 6 hours as needed for Wheezing or Shortness of Breath. 360 mL 11    allopurinoL (ZYLOPRIM) 100 MG tablet Take 1 tablet (100 mg total) by mouth once daily. 90 tablet 1    amLODIPine (NORVASC) 5 MG tablet Take 1 tablet (5 mg total) by mouth once daily. 30 tablet 0    aspirin (ECOTRIN) 81 MG EC tablet Take 1 tablet (81 mg total) by mouth once daily.      atorvastatin (LIPITOR) 40 MG tablet TAKE 1 TABLET BY MOUTH EVERY EVENING 90 tablet 3    azaTHIOprine (IMURAN) 50 mg Tab Take 1 tablet (50 mg total) by mouth once daily. 90 tablet 0    baclofen (LIORESAL) 10 MG tablet Take 1 tablet (10 mg total) by mouth once daily.  0     blood sugar diagnostic Strp 1 each by Misc.(Non-Drug; Combo Route) route 4 (four) times daily. 300 each 3    blood-glucose meter (TRUE METRIX GLUCOSE METER) kit Use as instructed to test blood glucose meter daily. 1 each 1    ELIQUIS 5 mg Tab Take 1 tablet (5 mg total) by mouth 2 (two) times daily. 180 tablet 1    fluticasone propionate (FLONASE) 50 mcg/actuation nasal spray 2 sprays (100 mcg total) by Each Nostril route 2 (two) times daily. 9.9 mL 11    fluticasone-salmeterol diskus inhaler 250-50 mcg Inhale 1 puff into the lungs 2 (two) times daily. Controller 180 each 3    furosemide (LASIX) 40 MG tablet Take 1 tablet (40 mg total) by mouth 2 (two) times a day. 60 tablet 3    gabapentin (NEURONTIN) 800 MG tablet Take 1 tablet (800 mg total) by mouth 3 (three) times daily. 270 tablet 4    insulin aspart U-100 (NOVOLOG FLEXPEN U-100 INSULIN) 100 unit/mL (3 mL) InPn pen INJECT 10 UNITS SUBCUTANEOUSLY THREE TIMES DAILY WITH MEALS 15 mL 0    ipratropium (ATROVENT) 0.02 % nebulizer solution INHALE THE CONTENTS OF 1 VIAL VIA NEBULIZER FOUR TIMES DAILY 1 Box 0    JARDIANCE 25 mg tablet TAKE 1 TABLET(25 MG) BY MOUTH EVERY DAY 90 tablet 1    latanoprost 0.005 % ophthalmic solution INSTILL 1 DROP INTO BOTH EYES ONE TIME DAILY. BOTTLE EXPIRES 42 DAYS AFTER OPENING 3 mL 4    levocetirizine (XYZAL) 5 MG tablet Take 1 tablet (5 mg total) by mouth every evening. 30 tablet 11    MICRO THIN LANCETS 33 gauge [facility]       nitroGLYCERIN 0.1 mg/hr TD PT24 (NITRODUR) 0.1 mg/hr PT24 Place 1 patch onto the skin once daily. 30 patch 0    pantoprazole (PROTONIX) 40 MG tablet Take 1 tablet (40 mg total) by mouth once daily. 90 tablet 1    ranolazine (RANEXA) 1,000 mg Tb12 Take 1 tablet (1,000 mg total) by mouth 2 (two) times daily. 60 tablet 11    semaglutide (OZEMPIC) 2 mg/dose (8 mg/3 mL) PnIj Inject 2 mg into the skin every 7 days. 9 mL 3    sertraline (ZOLOFT) 100 MG tablet Take 1 tablet (100 mg total) by mouth every evening. 90 tablet 1  "   tamsulosin (FLOMAX) 0.4 mg Cap Take 1 capsule (0.4 mg total) by mouth once daily. 90 capsule 1    tiotropium (SPIRIVA WITH HANDIHALER) 18 mcg inhalation capsule INHALE THE CONTENTS OF 1 CAPSULE ONE TIME DAILY (CONTROLLER) 90 capsule 3    traMADoL (ULTRAM) 50 mg tablet Take 1 tablet (50 mg total) by mouth every 8 (eight) hours as needed for Pain. 15 tablet 0    traZODone (DESYREL) 100 MG tablet Take 2 tablets (200 mg total) by mouth every evening. 180 tablet 1    triamcinolone acetonide 0.1% (KENALOG) 0.1 % ointment Apply topically 2 (two) times daily. Use on legs and back 454 g 0    VIOS AEROSOL DELIVERY SYSTEM Shefali USE AS DIRESTED  0     No current facility-administered medications for this visit.       Exam     ROS  Review of Systems   Constitutional:  Negative for appetite change, chills, fatigue and fever.   HENT:  Negative for congestion, ear pain, postnasal drip, rhinorrhea, sneezing, sore throat and tinnitus.    Respiratory:  Negative for cough and shortness of breath.    Cardiovascular:  Negative for chest pain and palpitations.   Gastrointestinal:  Negative for abdominal pain, constipation, diarrhea, nausea and vomiting.   Genitourinary:  Negative for difficulty urinating and dysuria.   Musculoskeletal:  Positive for arthralgias.   Neurological:  Negative for headaches.   Psychiatric/Behavioral:  Negative for sleep disturbance.          Physical Exam  Vitals:    09/26/22 0913   BP: 128/78   BP Location: Right arm   Patient Position: Sitting   BP Method: Large (Manual)   Pulse: 80   Temp: 97.4 °F (36.3 °C)   TempSrc: Tympanic   SpO2: 95%   Weight: 86.4 kg (190 lb 7.6 oz)   Height: 5' 5" (1.651 m)    Body mass index is 31.7 kg/m².  Weight: 86.4 kg (190 lb 7.6 oz)   Height: 5' 5" (165.1 cm)   Physical Exam  Constitutional:       General: He is not in acute distress.     Appearance: Normal appearance. He is well-developed. He is obese.   HENT:      Head: Normocephalic and atraumatic.      Nose: Nose normal. "      Mouth/Throat:      Pharynx: No oropharyngeal exudate.   Eyes:      Pupils: Pupils are equal, round, and reactive to light.   Cardiovascular:      Rate and Rhythm: Normal rate and regular rhythm.      Pulses: Normal pulses.      Heart sounds: No murmur heard.    No friction rub. No gallop.   Pulmonary:      Effort: Pulmonary effort is normal. No respiratory distress.      Breath sounds: Normal breath sounds. No wheezing.   Abdominal:      General: Bowel sounds are normal.      Palpations: Abdomen is soft.   Musculoskeletal:      Cervical back: Neck supple.   Lymphadenopathy:      Cervical: No cervical adenopathy.   Skin:     General: Skin is warm and dry.      Capillary Refill: Capillary refill takes less than 2 seconds.   Neurological:      General: No focal deficit present.      Mental Status: He is alert and oriented to person, place, and time. Mental status is at baseline.         Health Maintenance         Date Due Completion Date    COVID-19 Vaccine (4 - Booster for Pfizer series) 01/14/2022 11/19/2021    Diabetes Urine Screening 07/08/2022 7/8/2021    Influenza Vaccine (1) 09/01/2022 10/11/2021    PROSTATE-SPECIFIC ANTIGEN 09/16/2022 9/16/2021    Eye Exam 01/11/2023 1/11/2022    Hemoglobin A1c 01/31/2023 7/31/2022    Foot Exam 06/09/2023 6/9/2022 (Done)    Override on 6/9/2022: Done    Override on 6/3/2021: Done    Override on 2/21/2020: Done    Lipid Panel 06/10/2023 6/10/2022    High Dose Statin 09/20/2023 9/20/2022    TETANUS VACCINE 07/23/2030 7/23/2020    Colorectal Cancer Screening 03/04/2032 3/4/2022            Assessment & Plan     Assessment & Plan  Problem List Items Addressed This Visit          Cardiac/Vascular    Essential hypertension  -The current medical regimen is effective;  continue present plan and medications.     Relevant Orders    Ambulatory referral/consult to Nutrition Services    Chronic combined systolic and diastolic heart failure  - Stable; Monitor  - Patient advised to comply  with Cardiology follow-up for placement of wireless senor for pulmonary arterial pressure monitoring    Atherosclerosis of native coronary artery of native heart with unstable angina pectoris  -The current medical regimen is effective;  continue present plan and medications.        Endocrine    Type 2 diabetes mellitus  -The current medical regimen is effective;  continue present plan and medications.     Overview     Patient reports that he has been compliant with medications and diet, last HA1c was 6.1 on 3/3.          Relevant Orders    Ambulatory referral/consult to Nutrition Services     Other Visit Diagnoses       Hospital discharge follow-up    -  Primary  Counseled on age appropriate medical preventative services including age appropriate cancer screenings, age appropriate eye and dental exams and over all nutritional health Counseled on age appropriate vaccines and discussed upcoming health care needs based on age/gender. Discussed good sleep hygiene and stress management.    Hypertensive chronic kidney disease with stage 1 through stage 4 chronic kidney disease, or unspecified chronic kidney disease      - As ordered for management and education regarding safer dietary choices    Relevant Orders    Ambulatory referral/consult to Nutrition Services              Health Maintenance reviewed: Deferred per patient    Follow-up: 2 months for Chronic Conditions

## 2022-09-27 NOTE — SUBJECTIVE & OBJECTIVE
Review of Systems   Constitutional: Negative for diaphoresis, malaise/fatigue, weight gain and weight loss.   HENT:  Negative for congestion and nosebleeds.    Cardiovascular:  Negative for chest pain, claudication, cyanosis, dyspnea on exertion, irregular heartbeat, leg swelling, near-syncope, orthopnea, palpitations, paroxysmal nocturnal dyspnea and syncope.   Respiratory:  Negative for cough, hemoptysis, shortness of breath, sleep disturbances due to breathing, snoring, sputum production and wheezing.    Hematologic/Lymphatic: Negative for bleeding problem. Does not bruise/bleed easily.   Skin:  Negative for rash.   Musculoskeletal:  Negative for arthritis, back pain, falls, joint pain, muscle cramps and muscle weakness.   Gastrointestinal:  Negative for abdominal pain, constipation, diarrhea, heartburn, hematemesis, hematochezia, melena, nausea and vomiting.   Genitourinary:  Negative for dysuria, hematuria and nocturia.   Neurological:  Negative for excessive daytime sleepiness, dizziness, headaches, light-headedness, loss of balance, numbness, vertigo and weakness.   Objective:     Vital Signs (Most Recent):  Temp: 97.4 °F (36.3 °C) (09/25/22 1108)  Pulse: 71 (09/25/22 1616)  Resp: 18 (09/25/22 1616)  BP: (!) 148/75 (09/25/22 1133)  SpO2: 96 % (09/25/22 1616)   Vital Signs (24h Range):  Temp:  [97.4 °F (36.3 °C)] 97.4 °F (36.3 °C)  Pulse:  [80] 80  SpO2:  [95 %] 95 %  BP: (128)/(78) 128/78     Weight: 83 kg (182 lb 15.7 oz)  Body mass index is 30.45 kg/m².     SpO2: 96 %  O2 Device (Oxygen Therapy): nasal cannula    No intake or output data in the 24 hours ending 09/27/22 0748    Lines/Drains/Airways       Peripheral Intravenous Line  Duration                  Peripheral IV - Single Lumen 09/23/22 2050 20 G Right Antecubital 3 days                    Physical Exam  Vitals and nursing note reviewed.   Constitutional:       Appearance: Normal appearance. He is well-developed.   HENT:      Head: Normocephalic.       Mouth/Throat:      Mouth: Mucous membranes are moist.   Neck:      Vascular: No carotid bruit or JVD.   Cardiovascular:      Rate and Rhythm: Normal rate and regular rhythm.      Pulses: Normal pulses.      Heart sounds: Normal heart sounds. No murmur heard.    No friction rub.   Pulmonary:      Effort: Pulmonary effort is normal. No respiratory distress.      Breath sounds: Normal breath sounds. No wheezing or rales.   Abdominal:      General: Bowel sounds are normal. There is no distension.      Palpations: Abdomen is soft.      Tenderness: There is no abdominal tenderness. There is no guarding.   Musculoskeletal:         General: No swelling or tenderness.      Cervical back: Neck supple. No tenderness.      Right lower leg: No edema.      Left lower leg: No edema.   Skin:     General: Skin is warm and dry.      Capillary Refill: Capillary refill takes less than 2 seconds.      Findings: No rash.   Neurological:      General: No focal deficit present.      Mental Status: He is alert and oriented to person, place, and time.   Psychiatric:         Mood and Affect: Mood normal.         Behavior: Behavior normal.         Thought Content: Thought content normal.       Significant Labs: BMP: No results for input(s): GLU, NA, K, CL, CO2, BUN, CREATININE, CALCIUM, MG in the last 48 hours., CMP No results for input(s): NA, K, CL, CO2, GLU, BUN, CREATININE, CALCIUM, PROT, ALBUMIN, BILITOT, ALKPHOS, AST, ALT, ANIONGAP, ESTGFRAFRICA, EGFRNONAA in the last 48 hours., CBC No results for input(s): WBC, HGB, HCT, PLT in the last 48 hours., INR No results for input(s): INR, PROTIME in the last 48 hours., Lipid Panel No results for input(s): CHOL, HDL, LDLCALC, TRIG, CHOLHDL in the last 48 hours., and Troponin No results for input(s): TROPONINI in the last 48 hours.    Significant Imaging: Echocardiogram: 2D echo with color flow doppler: No results found. However, due to the size of the patient record, not all encounters were  searched. Please check Results Review for a complete set of results. and Transthoracic echo (TTE) complete (Cupid Only):   Results for orders placed or performed during the hospital encounter of 07/30/22   Echo   Result Value Ref Range    BSA 2.03 m2    TDI SEPTAL 0.04 m/s    LV LATERAL E/E' RATIO 26.00 m/s    LV SEPTAL E/E' RATIO 32.50 m/s    LA WIDTH 3.60 cm    IVC diameter 2.26 cm    Left Ventricular Outflow Tract Mean Velocity 0.42259693905812 cm/s    Left Ventricular Outflow Tract Mean Gradient 2.00 mmHg    TDI LATERAL 0.05 m/s    LVIDd 4.08 3.5 - 6.0 cm    IVS 2.06 (A) 0.6 - 1.1 cm    Posterior Wall 1.93 (A) 0.6 - 1.1 cm    Ao root annulus 2.90 cm    LVIDs 3.20 2.1 - 4.0 cm    FS 22 28 - 44 %    LA volume 80.06 cm3    STJ 2.86 cm    Ascending aorta 3.13 cm    LV mass 381.36 g    LA size 4.71 cm    Left Ventricle Relative Wall Thickness 0.95 cm    AV mean gradient 9 mmHg    AV valve area 1.66 cm2    AV Velocity Ratio 0.46     AV index (prosthetic) 0.45     MV valve area p 1/2 method 3.83 cm2    E/A ratio 2.89     Mean e' 0.05 m/s    E wave deceleration time 198.957827409058429 msec    IVRT 59.887586722982303 msec    LVOT diameter 2.17 cm    LVOT area 3.7 cm2    LVOT peak mathew 0.91 m/s    LVOT peak VTI 18.60 cm    Ao peak mathew 2.00 m/s    Ao VTI 41.3 cm    RVOT peak mathew 0.52 m/s    RVOT peak VTI 9.6 cm    Mr max mathew 4.25 m/s    LVOT stroke volume 68.75 cm3    AV peak gradient 16 mmHg    PV mean gradient 0.64 mmHg    E/E' ratio 28.89 m/s    MV Peak E Mathew 1.30 m/s    TR Max Mathew 2.76 m/s    MV stenosis pressure 1/2 time 57.936145093521468 ms    MV Peak A Mathew 0.45 m/s    LV Systolic Volume 40.99 mL    LV Systolic Volume Index 20.8 mL/m2    LV Diastolic Volume 73.19 mL    LV Diastolic Volume Index 37.15 mL/m2    LA Volume Index 40.6 mL/m2    LV Mass Index 194 g/m2    RA Major Axis 4.41 cm    Left Atrium Minor Axis 5.57 cm    Left Atrium Major Axis 5.54 cm    Triscuspid Valve Regurgitation Peak Gradient 30 mmHg    Right  Atrial Pressure (from IVC) 15 mmHg    EF 40 %    TV rest pulmonary artery pressure 45 mmHg    Narrative    · The left ventricle is normal in size with severe concentric hypertrophy   and mildly decreased systolic function.  · Mild left atrial enlargement.  · The estimated ejection fraction is 40 - 45%.  · Grade III left ventricular diastolic dysfunction.  · Normal right ventricular size with mildly reduced right ventricular   systolic function.  · Mild tricuspid regurgitation.  · Mild pulmonic regurgitation.  · Elevated central venous pressure (15 mmHg).  · The estimated PA systolic pressure is 45 mmHg.  · There is pulmonary hypertension.

## 2022-09-27 NOTE — ASSESSMENT & PLAN NOTE
Recurrent chest pain   Stress test x2 without ischemia, chronic scars   Holding Imdur due to hypotension  Start nitro patch  Continue Ranexa    9/25/2022   Increase nitro patch 0.2   Continue holding beta-blocker/Imdur/Entresto due to labile blood pressures  Continue Ranexa   Discussed cessation from cocaine

## 2022-09-27 NOTE — PROGRESS NOTES
[facility] ([facility])  Cardiology  Progress Note    Patient Name: Crow Green  MRN: [id number]  Admission Date: 9/23/2022  Hospital Length of Stay: 0 days  Code Status: Prior   Attending Physician: No att. providers found   Primary Care Physician: Eunice Mora NP  Expected Discharge Date: 9/25/2022  Principal Problem:Chest pain    Subjective:     Hospital Course:   66 yo M with PMH of  CAD nonobstructive and small vessel Dz per [facility] done in 11/16, PAF s/p PVI in  by Dr. Brown, CHFpEF 45%, DM, HTN, HLD, Asthma/COPD on home O2, and SANDRITA on CPAP who was admitted for chest pain.  Patient has been admitted multiple times for chest pain.  Has had 2 stress tests which have shown scars but no ischemia.  Patient is on antianginals at home.  Labs with potassium 3.1, improved to 3.6.  Creatinine 2, improved to 1.6.  Lasix being held  Troponin 0.087> 0.102> 0.090.  Patient noted to be hypotensive on admission.  Toprol XL, Entresto, Imdur being held.  Echo 7/22 EF 40-45%, grade 3 diastolic dysfunction, mildly reduced RV dysfunction  EKG normal sinus rhythm, first-degree AV block, RBBB, ST-T wave abn (stable)  Patient is planned to have CardioMEMS placed in October 9/25/22  UDS positive for cocaine use.  Had chest pain overnight, relieved with Norco.  Started on nitro patch.  Labile blood pressures, beta-blocker/Imdur/Entresto held  JOSE MARIA improving, holding Lasix and Entresto        Review of Systems   Constitutional: Negative for diaphoresis, malaise/fatigue, weight gain and weight loss.   HENT:  Negative for congestion and nosebleeds.    Cardiovascular:  Negative for chest pain, claudication, cyanosis, dyspnea on exertion, irregular heartbeat, leg swelling, near-syncope, orthopnea, palpitations, paroxysmal nocturnal dyspnea and syncope.   Respiratory:  Negative for cough, hemoptysis, shortness of breath, sleep disturbances due to breathing, snoring, sputum production and wheezing.     Hematologic/Lymphatic: Negative for bleeding problem. Does not bruise/bleed easily.   Skin:  Negative for rash.   Musculoskeletal:  Negative for arthritis, back pain, falls, joint pain, muscle cramps and muscle weakness.   Gastrointestinal:  Negative for abdominal pain, constipation, diarrhea, heartburn, hematemesis, hematochezia, melena, nausea and vomiting.   Genitourinary:  Negative for dysuria, hematuria and nocturia.   Neurological:  Negative for excessive daytime sleepiness, dizziness, headaches, light-headedness, loss of balance, numbness, vertigo and weakness.   Objective:     Vital Signs (Most Recent):  Temp: 97.4 °F (36.3 °C) (09/25/22 1108)  Pulse: 71 (09/25/22 1616)  Resp: 18 (09/25/22 1616)  BP: (!) 148/75 (09/25/22 1133)  SpO2: 96 % (09/25/22 1616)   Vital Signs (24h Range):  Temp:  [97.4 °F (36.3 °C)] 97.4 °F (36.3 °C)  Pulse:  [80] 80  SpO2:  [95 %] 95 %  BP: (128)/(78) 128/78     Weight: 83 kg (182 lb 15.7 oz)  Body mass index is 30.45 kg/m².     SpO2: 96 %  O2 Device (Oxygen Therapy): nasal cannula    No intake or output data in the 24 hours ending 09/27/22 0748    Lines/Drains/Airways       Peripheral Intravenous Line  Duration                  Peripheral IV - Single Lumen 09/23/22 2050 20 G Right Antecubital 3 days                    Physical Exam  Vitals and nursing note reviewed.   Constitutional:       Appearance: Normal appearance. He is well-developed.   HENT:      Head: Normocephalic.      Mouth/Throat:      Mouth: Mucous membranes are moist.   Neck:      Vascular: No carotid bruit or JVD.   Cardiovascular:      Rate and Rhythm: Normal rate and regular rhythm.      Pulses: Normal pulses.      Heart sounds: Normal heart sounds. No murmur heard.    No friction rub.   Pulmonary:      Effort: Pulmonary effort is normal. No respiratory distress.      Breath sounds: Normal breath sounds. No wheezing or rales.   Abdominal:      General: Bowel sounds are normal. There is no distension.       Palpations: Abdomen is soft.      Tenderness: There is no abdominal tenderness. There is no guarding.   Musculoskeletal:         General: No swelling or tenderness.      Cervical back: Neck supple. No tenderness.      Right lower leg: No edema.      Left lower leg: No edema.   Skin:     General: Skin is warm and dry.      Capillary Refill: Capillary refill takes less than 2 seconds.      Findings: No rash.   Neurological:      General: No focal deficit present.      Mental Status: He is alert and oriented to person, place, and time.   Psychiatric:         Mood and Affect: Mood normal.         Behavior: Behavior normal.         Thought Content: Thought content normal.       Significant Labs: BMP: No results for input(s): GLU, NA, K, CL, CO2, BUN, CREATININE, CALCIUM, MG in the last 48 hours., CMP No results for input(s): NA, K, CL, CO2, GLU, BUN, CREATININE, CALCIUM, PROT, ALBUMIN, BILITOT, ALKPHOS, AST, ALT, ANIONGAP, ESTGFRAFRICA, EGFRNONAA in the last 48 hours., CBC No results for input(s): WBC, HGB, HCT, PLT in the last 48 hours., INR No results for input(s): INR, PROTIME in the last 48 hours., Lipid Panel No results for input(s): CHOL, HDL, LDLCALC, TRIG, CHOLHDL in the last 48 hours., and Troponin No results for input(s): TROPONINI in the last 48 hours.    Significant Imaging: Echocardiogram: 2D echo with color flow doppler: No results found. However, due to the size of the patient record, not all encounters were searched. Please check Results Review for a complete set of results. and Transthoracic echo (TTE) complete (Cupid Only):   Results for orders placed or performed during the hospital encounter of 07/30/22   Echo   Result Value Ref Range    BSA 2.03 m2    TDI SEPTAL 0.04 m/s    LV LATERAL E/E' RATIO 26.00 m/s    LV SEPTAL E/E' RATIO 32.50 m/s    LA WIDTH 3.60 cm    IVC diameter 2.26 cm    Left Ventricular Outflow Tract Mean Velocity 0.46641244799900 cm/s    Left Ventricular Outflow Tract Mean Gradient  2.00 mmHg    TDI LATERAL 0.05 m/s    LVIDd 4.08 3.5 - 6.0 cm    IVS 2.06 (A) 0.6 - 1.1 cm    Posterior Wall 1.93 (A) 0.6 - 1.1 cm    Ao root annulus 2.90 cm    LVIDs 3.20 2.1 - 4.0 cm    FS 22 28 - 44 %    LA volume 80.06 cm3    STJ 2.86 cm    Ascending aorta 3.13 cm    LV mass 381.36 g    LA size 4.71 cm    Left Ventricle Relative Wall Thickness 0.95 cm    AV mean gradient 9 mmHg    AV valve area 1.66 cm2    AV Velocity Ratio 0.46     AV index (prosthetic) 0.45     MV valve area p 1/2 method 3.83 cm2    E/A ratio 2.89     Mean e' 0.05 m/s    E wave deceleration time 198.989333696019224 msec    IVRT 59.887730378997766 msec    LVOT diameter 2.17 cm    LVOT area 3.7 cm2    LVOT peak mathew 0.91 m/s    LVOT peak VTI 18.60 cm    Ao peak mathew 2.00 m/s    Ao VTI 41.3 cm    RVOT peak mathew 0.52 m/s    RVOT peak VTI 9.6 cm    Mr max mathew 4.25 m/s    LVOT stroke volume 68.75 cm3    AV peak gradient 16 mmHg    PV mean gradient 0.64 mmHg    E/E' ratio 28.89 m/s    MV Peak E Mathew 1.30 m/s    TR Max Mathew 2.76 m/s    MV stenosis pressure 1/2 time 57.493113653961271 ms    MV Peak A Mathew 0.45 m/s    LV Systolic Volume 40.99 mL    LV Systolic Volume Index 20.8 mL/m2    LV Diastolic Volume 73.19 mL    LV Diastolic Volume Index 37.15 mL/m2    LA Volume Index 40.6 mL/m2    LV Mass Index 194 g/m2    RA Major Axis 4.41 cm    Left Atrium Minor Axis 5.57 cm    Left Atrium Major Axis 5.54 cm    Triscuspid Valve Regurgitation Peak Gradient 30 mmHg    Right Atrial Pressure (from IVC) 15 mmHg    EF 40 %    TV rest pulmonary artery pressure 45 mmHg    Narrative    · The left ventricle is normal in size with severe concentric hypertrophy   and mildly decreased systolic function.  · Mild left atrial enlargement.  · The estimated ejection fraction is 40 - 45%.  · Grade III left ventricular diastolic dysfunction.  · Normal right ventricular size with mildly reduced right ventricular   systolic function.  · Mild tricuspid regurgitation.  · Mild pulmonic  regurgitation.  · Elevated central venous pressure (15 mmHg).  · The estimated PA systolic pressure is 45 mmHg.  · There is pulmonary hypertension.        Assessment and Plan:       * Chest pain  Recurrent chest pain   Stress test x2 without ischemia, chronic scars   Holding Imdur due to hypotension  Start nitro patch  Continue Ranexa    9/25/2022   Increase nitro patch 0.2   Continue holding beta-blocker/Imdur/Entresto due to labile blood pressures  Continue Ranexa   Discussed cessation from cocaine    Cardiomyopathy  Currently compensated   On home oxygen  Lasix being held due to JOSE MARIA  Will be receiving CardioMEMS in October    Hypotension due to medication  Patient noted to be hypotensive on admission.  Toprol XL, Entresto, Imdur being held.      Elevated troponin  Chronically elevated  No evidence of ischemia on prior stress test    JOSE MARIA (acute kidney injury)  Acute kidney injury due to dehydration   Lasix and Entresto being held-> renal function improving    PAF (paroxysmal atrial fibrillation)  Currently in normal sinus rhythm   Continue Eliquis    Type 2 diabetes mellitus  Management per Hospital Medicine        VTE Risk Mitigation (From admission, onward)    None          Floyd Salazar MD  Cardiology  [facility] Tele[facility] ([facility])

## 2022-09-27 NOTE — HOSPITAL COURSE
68 yo M with PMH of  CAD nonobstructive and small vessel Dz per [facility] done in 11/16, PAF s/p PVI in  by Dr. Brown, CHFpEF 45%, DM, HTN, HLD, Asthma/COPD on home O2, and SANDRITA on CPAP who was admitted for chest pain.  Patient has been admitted multiple times for chest pain.  Has had 2 stress tests which have shown scars but no ischemia.  Patient is on antianginals at home.  Labs with potassium 3.1, improved to 3.6.  Creatinine 2, improved to 1.6.  Lasix being held  Troponin 0.087> 0.102> 0.090.  Patient noted to be hypotensive on admission.  Toprol XL, Entresto, Imdur being held.  Echo 7/22 EF 40-45%, grade 3 diastolic dysfunction, mildly reduced RV dysfunction  EKG normal sinus rhythm, first-degree AV block, RBBB, ST-T wave abn (stable)  Patient is planned to have CardioMEMS placed in October 9/25/22  UDS positive for cocaine use.  Had chest pain overnight, relieved with Norco.  Started on nitro patch.  Labile blood pressures, beta-blocker/Imdur/Entresto held  JOSE MARIA improving, holding Lasix and Entresto

## 2022-09-28 ENCOUNTER — LAB VISIT (OUTPATIENT)
Dept: LAB | Facility: HOSPITAL | Age: 67
End: 2022-09-28
Attending: INTERNAL MEDICINE
Payer: MEDICARE

## 2022-09-28 ENCOUNTER — OFFICE VISIT (OUTPATIENT)
Dept: PODIATRY | Facility: CLINIC | Age: 67
End: 2022-09-28
Payer: MEDICARE

## 2022-09-28 VITALS — BODY MASS INDEX: 31.65 KG/M2 | HEIGHT: 65 IN | WEIGHT: 190 LBS

## 2022-09-28 DIAGNOSIS — E11.49 TYPE II DIABETES MELLITUS WITH NEUROLOGICAL MANIFESTATIONS: Primary | ICD-10-CM

## 2022-09-28 DIAGNOSIS — B35.1 DERMATOPHYTOSIS OF NAIL: ICD-10-CM

## 2022-09-28 DIAGNOSIS — L84 PRE-ULCERATIVE CALLUSES: ICD-10-CM

## 2022-09-28 DIAGNOSIS — E11.3219 TYPE 2 DIABETES MELLITUS WITH MILD NONPROLIFERATIVE RETINOPATHY AND MACULAR EDEMA, WITH LONG-TERM CURRENT USE OF INSULIN, UNSPECIFIED LATERALITY: ICD-10-CM

## 2022-09-28 DIAGNOSIS — Z79.4 TYPE 2 DIABETES MELLITUS WITH MILD NONPROLIFERATIVE RETINOPATHY AND MACULAR EDEMA, WITH LONG-TERM CURRENT USE OF INSULIN, UNSPECIFIED LATERALITY: ICD-10-CM

## 2022-09-28 LAB
ALBUMIN/CREAT UR: 1310.7 UG/MG (ref 0–30)
CREAT UR-MCNC: 187 MG/DL (ref 23–375)
MICROALBUMIN UR DL<=1MG/L-MCNC: 2451 UG/ML

## 2022-09-28 PROCEDURE — 1160F RVW MEDS BY RX/DR IN RCRD: CPT | Mod: CPTII,S$GLB,, | Performed by: PODIATRIST

## 2022-09-28 PROCEDURE — 1101F PT FALLS ASSESS-DOCD LE1/YR: CPT | Mod: CPTII,S$GLB,, | Performed by: PODIATRIST

## 2022-09-28 PROCEDURE — 4010F PR ACE/ARB THEARPY RXD/TAKEN: ICD-10-PCS | Mod: CPTII,S$GLB,, | Performed by: PODIATRIST

## 2022-09-28 PROCEDURE — 3044F HG A1C LEVEL LT 7.0%: CPT | Mod: CPTII,S$GLB,, | Performed by: PODIATRIST

## 2022-09-28 PROCEDURE — 3288F FALL RISK ASSESSMENT DOCD: CPT | Mod: CPTII,S$GLB,, | Performed by: PODIATRIST

## 2022-09-28 PROCEDURE — 1159F PR MEDICATION LIST DOCUMENTED IN MEDICAL RECORD: ICD-10-PCS | Mod: CPTII,S$GLB,, | Performed by: PODIATRIST

## 2022-09-28 PROCEDURE — 3288F PR FALLS RISK ASSESSMENT DOCUMENTED: ICD-10-PCS | Mod: CPTII,S$GLB,, | Performed by: PODIATRIST

## 2022-09-28 PROCEDURE — 3044F PR MOST RECENT HEMOGLOBIN A1C LEVEL <7.0%: ICD-10-PCS | Mod: CPTII,S$GLB,, | Performed by: PODIATRIST

## 2022-09-28 PROCEDURE — 3062F POS MACROALBUMINURIA REV: CPT | Mod: CPTII,S$GLB,, | Performed by: PODIATRIST

## 2022-09-28 PROCEDURE — 1160F PR REVIEW ALL MEDS BY PRESCRIBER/CLIN PHARMACIST DOCUMENTED: ICD-10-PCS | Mod: CPTII,S$GLB,, | Performed by: PODIATRIST

## 2022-09-28 PROCEDURE — 99499 NO LOS: ICD-10-PCS | Mod: S$GLB,,, | Performed by: PODIATRIST

## 2022-09-28 PROCEDURE — 99999 PR PBB SHADOW E&M-EST. PATIENT-LVL IV: ICD-10-PCS | Mod: PBBFAC,,, | Performed by: PODIATRIST

## 2022-09-28 PROCEDURE — 11721 DEBRIDE NAIL 6 OR MORE: CPT | Mod: 59,Q7,S$GLB, | Performed by: PODIATRIST

## 2022-09-28 PROCEDURE — 11055 PARING/CUTG B9 HYPRKER LES 1: CPT | Mod: Q7,S$GLB,, | Performed by: PODIATRIST

## 2022-09-28 PROCEDURE — 99499 UNLISTED E&M SERVICE: CPT | Mod: S$GLB,,, | Performed by: PODIATRIST

## 2022-09-28 PROCEDURE — 3008F PR BODY MASS INDEX (BMI) DOCUMENTED: ICD-10-PCS | Mod: CPTII,S$GLB,, | Performed by: PODIATRIST

## 2022-09-28 PROCEDURE — 11721 PR DEBRIDEMENT OF NAILS, 6 OR MORE: ICD-10-PCS | Mod: 59,Q7,S$GLB, | Performed by: PODIATRIST

## 2022-09-28 PROCEDURE — 3062F PR POS MACROALBUMINURIA RESULT DOCUMENTED/REVIEW: ICD-10-PCS | Mod: CPTII,S$GLB,, | Performed by: PODIATRIST

## 2022-09-28 PROCEDURE — 3008F BODY MASS INDEX DOCD: CPT | Mod: CPTII,S$GLB,, | Performed by: PODIATRIST

## 2022-09-28 PROCEDURE — 4010F ACE/ARB THERAPY RXD/TAKEN: CPT | Mod: CPTII,S$GLB,, | Performed by: PODIATRIST

## 2022-09-28 PROCEDURE — 3066F PR DOCUMENTATION OF TREATMENT FOR NEPHROPATHY: ICD-10-PCS | Mod: CPTII,S$GLB,, | Performed by: PODIATRIST

## 2022-09-28 PROCEDURE — 1159F MED LIST DOCD IN RCRD: CPT | Mod: CPTII,S$GLB,, | Performed by: PODIATRIST

## 2022-09-28 PROCEDURE — 11055 PR TRIM HYPERKERATOTIC SKIN LESION, ONE: ICD-10-PCS | Mod: Q7,S$GLB,, | Performed by: PODIATRIST

## 2022-09-28 PROCEDURE — 82570 ASSAY OF URINE CREATININE: CPT | Performed by: NURSE PRACTITIONER

## 2022-09-28 PROCEDURE — 99999 PR PBB SHADOW E&M-EST. PATIENT-LVL IV: CPT | Mod: PBBFAC,,, | Performed by: PODIATRIST

## 2022-09-28 PROCEDURE — 3066F NEPHROPATHY DOC TX: CPT | Mod: CPTII,S$GLB,, | Performed by: PODIATRIST

## 2022-09-28 PROCEDURE — 1101F PR PT FALLS ASSESS DOC 0-1 FALLS W/OUT INJ PAST YR: ICD-10-PCS | Mod: CPTII,S$GLB,, | Performed by: PODIATRIST

## 2022-09-29 ENCOUNTER — OUTPATIENT CASE MANAGEMENT (OUTPATIENT)
Dept: ADMINISTRATIVE | Facility: OTHER | Age: 67
End: 2022-09-29
Payer: MEDICARE

## 2022-09-29 ENCOUNTER — PATIENT MESSAGE (OUTPATIENT)
Dept: INTERNAL MEDICINE | Facility: CLINIC | Age: 67
End: 2022-09-29
Payer: MEDICARE

## 2022-09-29 ENCOUNTER — HOSPITAL ENCOUNTER (OUTPATIENT)
Dept: SLEEP MEDICINE | Facility: HOSPITAL | Age: 67
Discharge: HOME | End: 2022-09-29
Attending: INTERNAL MEDICINE
Payer: MEDICARE

## 2022-09-29 DIAGNOSIS — G47.33 OSA (OBSTRUCTIVE SLEEP APNEA): ICD-10-CM

## 2022-09-29 DIAGNOSIS — I50.22 CHRONIC SYSTOLIC CONGESTIVE HEART FAILURE: ICD-10-CM

## 2022-09-29 PROCEDURE — 95811 PR POLYSOMNOGRAPHY W/CPAP: ICD-10-PCS | Mod: 26,,, | Performed by: INTERNAL MEDICINE

## 2022-09-29 PROCEDURE — 95811 POLYSOM 6/>YRS CPAP 4/> PARM: CPT | Mod: 26,,, | Performed by: INTERNAL MEDICINE

## 2022-09-29 PROCEDURE — 95811 POLYSOM 6/>YRS CPAP 4/> PARM: CPT

## 2022-09-29 NOTE — PROGRESS NOTES
Sw attempted to follow up with pt today. Pt was recently hospitalized for CHF. Unable to leave patient a message, voice mailbox full. SW will attempt at a later date.

## 2022-09-29 NOTE — LETTER
October 5, 2022    Crow FISCHER Norma  [address]  [facility] [address]             [facility] [address] [place] [address] Dear Mr. Green:     I am writing from the Outpatient Complex Care Management Department at [facility].  I have been unsuccessful at reaching you to follow up with you to see how you have been doing. Please contact me for further assistance.    I can be reached at [phone] Monday thru Friday from 8:00am to 4:30pm.  [facility] also has a program with a nurse available 24/7 to answer questions or provide medical advice.  [facility] can be reached at [phone].    Sincerely,   Briana Tripp LMSW

## 2022-09-29 NOTE — Clinical Note
Obstructive Sleep Apnea (G47.33) Trial of CPAP therapy on 9 cm H2O with a Medium size Resmed Full Face Mask AirFit F20 mask and heated humidification. Avoid alcohol, sedatives and other CNS depressants that may worsen sleep apnea and disrupt normal sleep architecture. Sleep hygiene should be reviewed to assess factors that may improve sleep quality. Weight management and regular exercise should be initiated or continued. Return to Sleep Center for re-evaluation after 4 -6 weeks of therapy

## 2022-09-30 ENCOUNTER — TELEPHONE (OUTPATIENT)
Dept: DIABETES | Facility: CLINIC | Age: 67
End: 2022-09-30
Payer: MEDICARE

## 2022-09-30 NOTE — TELEPHONE ENCOUNTER
Called pt back again no answer VM full ----- Message from Briana Pena sent at 9/30/2022  3:38 PM CDT -----  Contact: Self [phone]  Patient is returning a phone call.    Who left a message for the patient: MA    Does patient know what this is regarding:  Dexcom devices     Would you like a call back, or a response through your [facility] portal?:   call back    Comments:

## 2022-09-30 NOTE — TELEPHONE ENCOUNTER
Called pt to notify him of dexcom order but no answer and VM full ----- Message from Eduardo Castillo PA-C sent at 9/26/2022  2:45 PM CDT -----  Urgent refill to [place] for CCS  ----- Message -----  From: Doreen Kamara LPN  Sent: 9/26/2022   2:02 PM CDT  To: Eduardo Castillo PA-C    Pt discovered he does not have any Dexcom devices at home. He has been a week and a half without one on. [facility] [facility]  ----- Message -----  From: Anna Bashir  Sent: 9/26/2022  12:43 PM CDT  To: Anna Clark Staff    Pt is requesting a call back in regards to Dexcom. Pt can be reached at [phone] (home)

## 2022-10-01 NOTE — PROCEDURES
"Obstructive Sleep Apnea (G47.33)   Trial of CPAP therapy on 9 cm H2O with a Medium size Resmed Full Face Mask AirFit F20 mask and   heated humidification.   Avoid alcohol, sedatives and other CNS depressants that may worsen sleep apnea and disrupt normal sleep   architecture.   Sleep hygiene should be reviewed to assess factors that may improve sleep quality.   Weight management and regular exercise should be initiated or continued.   Return to Sleep Center for re-evaluation after 4 -6 weeks of therapy     See imported Sleep Study result in "Chart Review" under the   "Media tab".      (This Sleep Study was interpreted by a Board Certified Sleep  Specialist who conducted an epoch-by-epoch review of the entire  raw data recording.)     (The indication for this sleep study was reviewed and deemed  appropriate by AASM Practice Parameters or other reasons by a  Board Certified Sleep Specialist.)      The sleep study that you ordered is complete.  You have ordered sleep LAB services to perform the sleep study forNAME@       You can look  for the report in the  Media as a procedure document type.    As the ordering provider, you are responsible for reviewing the results and implementing a treatment plan with your patient.     If you need a Sleep Medicine provider to explain the sleep study findings and arrange treatment for the patient, please refer patient for consultation to our Sleep Clinic via [facility] with Ambulatory Consult Sleep.     To do that please place an order for an  "Ambulatory Consult Sleep" - it will go to our clinic work queue for our Medical Assistant to contact the patient for an appointment.     For any questions, please contact our clinic staff at  to talk to clinical staff or pulmonary nurse navigator    Andre Vargas MD   "

## 2022-10-03 ENCOUNTER — DOCUMENT SCAN (OUTPATIENT)
Dept: HOME HEALTH SERVICES | Facility: HOSPITAL | Age: 67
End: 2022-10-03
Payer: MEDICARE

## 2022-10-03 DIAGNOSIS — Z01.818 PRE-OP TESTING: Primary | ICD-10-CM

## 2022-10-03 DIAGNOSIS — I50.42 CHRONIC COMBINED SYSTOLIC AND DIASTOLIC HEART FAILURE: ICD-10-CM

## 2022-10-05 NOTE — PROGRESS NOTES
2nd Attempt to complete SW follow-up for Outpatient Care Management; left message requesting a return call and SW mailed a letter with contact information requesting a return call.  OPCM RN notified.

## 2022-10-07 DIAGNOSIS — G47.33 OSA (OBSTRUCTIVE SLEEP APNEA): Primary | ICD-10-CM

## 2022-10-09 NOTE — PROGRESS NOTES
Subjective:     Patient ID: Crow Green is a 67 y.o. male.    Chief Complaint: Nail Care (No c/o pain, wears sas shoes with socks, diabetic Pt, last seen 09/26/22 with Louis Del Rosario NP)    Crow is a 67 y.o. male who presents to the clinic for evaluation and treatment of high risk feet. Crow has a past medical history of Arthritis, Asthma, Atrial fibrillation, Atrial fibrillation with RVR (8/7/2019), CHF (congestive heart failure), Chronic obstructive pulmonary disease (8/5/2020), Depression, Dermatomyositis, Diabetes mellitus, type 2 (Diagnosed in 2000), Elevated PSA, Follow up (7/30/2020), Hypertension, Myocardial infarction, and Sleep apnea. The patient's chief complaint is long, thick toenails. This patient has documented high risk feet requiring routine maintenance secondary to diabetes mellitis and those secondary complications of diabetes, as mentioned..    PCP: Eunice Mora NP    Date Last Seen by PCP: 09/26/2022    Current shoe gear:  Affected Foot: Rx diabetic extra depth shoes and custom accommodative insoles     Unaffected Foot: Rx diabetic extra depth shoes and custom accommodative insoles    Hemoglobin A1C   Date Value Ref Range Status   07/31/2022 6.0 (H) 4.0 - 5.6 % Final     Comment:     ADA Screening Guidelines:  5.7-6.4%  Consistent with prediabetes  >or=6.5%  Consistent with diabetes    High levels of fetal hemoglobin interfere with the HbA1C  assay. Heterozygous hemoglobin variants (HbS, HgC, etc)do  not significantly interfere with this assay.   However, presence of multiple variants may affect accuracy.     06/10/2022 6.4 (H) 4.0 - 5.6 % Final     Comment:     ADA Screening Guidelines:  5.7-6.4%  Consistent with prediabetes  >or=6.5%  Consistent with diabetes    High levels of fetal hemoglobin interfere with the HbA1C  assay. Heterozygous hemoglobin variants (HbS, HgC, etc)do  not significantly interfere with this assay.   However, presence of multiple variants may affect  accuracy.     03/03/2022 6.1 (H) 4.0 - 5.6 % Final     Comment:     ADA Screening Guidelines:  5.7-6.4%  Consistent with prediabetes  >or=6.5%  Consistent with diabetes    High levels of fetal hemoglobin interfere with the HbA1C  assay. Heterozygous hemoglobin variants (HbS, HgC, etc)do  not significantly interfere with this assay.   However, presence of multiple variants may affect accuracy.         Patient Active Problem List   Diagnosis    ED (erectile dysfunction)    Non-proliferative diabetic retinopathy, mild, both eyes    Essential hypertension    Diabetic polyneuropathy    Nonobstructive coronary artery disease of native artery of native heart    Chronic combined systolic and diastolic heart failure    Type 2 diabetes mellitus    SANDRITA (obstructive sleep apnea)    H/O Asthma    Psychophysiological insomnia    Nightmares    Sleep related gastroesophageal reflux disease    Inadequate sleep hygiene    Chest pain    PAF (paroxysmal atrial fibrillation)    At risk for medication noncompliance    Obesity (BMI 30.0-34.9)    Indeterminate stage chronic open angle glaucoma    Right hip pain    Gait instability    Chronic right shoulder pain    Arthritis    Left sided sciatica    Dyspnea on exertion    Osteoarthritis of spine with radiculopathy, lumbar region    HNP (herniated nucleus pulposus), lumbar    Gastroesophageal reflux disease without esophagitis    Chronic diastolic heart failure    Hypogonadism in male    Disorder of parathyroid gland    Hyperlipidemia associated with type 2 diabetes mellitus    JOSE MARIA (acute kidney injury)    Traumatic tear of left rotator cuff    Other chronic pain    Left shoulder pain    Complete tear of left rotator cuff    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 associated with type 2 diabetes mellitus    Lumbar radiculopathy    Diabetic ulcer of toe of left foot associated with type 2 diabetes mellitus, with necrosis of muscle    Ulcer of left foot     Elevated troponin    Hypotension due to medication    Dermatomyositis    Postprocedural hypotension    Advanced directives, counseling/discussion    Schizoaffective disorder, depressive type    Chronic obstructive pulmonary disease    MDD (major depressive disorder), recurrent episode, moderate    DOMINIK (generalized anxiety disorder)    Bilateral carpal tunnel syndrome    Rotator cuff tear arthropathy of left shoulder    Sacroiliac dysfunction    Atherosclerosis of native coronary artery of native heart with unstable angina pectoris    Coronary vasospasm    Cocaine abuse    Osteopenia    Atherosclerosis of aorta    Cardiac asthma    Chronic gout of multiple sites    Acquired absence of left great toe    Acute renal failure superimposed on stage 3 chronic kidney disease    Pneumonia    Acute on chronic respiratory failure with hypoxia    Hypoxia    Cardiomyopathy    Bilateral carotid artery stenosis    Substance abuse(UDS + for cocaine )       Medication List with Changes/Refills   Current Medications    ACETAMINOPHEN (TYLENOL) 500 MG TABLET    Take 2 tablets (1,000 mg total) by mouth every 8 (eight) hours as needed for Pain.    ALBUTEROL (PROVENTIL) 2.5 MG /3 ML (0.083 %) NEBULIZER SOLUTION    Take 3 mLs (2.5 mg total) by nebulization every 4 to 6 hours as needed for Wheezing or Shortness of Breath.    ALLOPURINOL (ZYLOPRIM) 100 MG TABLET    Take 1 tablet (100 mg total) by mouth once daily.    AMLODIPINE (NORVASC) 5 MG TABLET    Take 1 tablet (5 mg total) by mouth once daily.    ASPIRIN (ECOTRIN) 81 MG EC TABLET    Take 1 tablet (81 mg total) by mouth once daily.    ATORVASTATIN (LIPITOR) 40 MG TABLET    TAKE 1 TABLET BY MOUTH EVERY EVENING    AZATHIOPRINE (IMURAN) 50 MG TAB    Take 1 tablet (50 mg total) by mouth once daily.    BACLOFEN (LIORESAL) 10 MG TABLET    Take 1 tablet (10 mg total) by mouth once daily.    BLOOD SUGAR DIAGNOSTIC STRP    1 each by Misc.(Non-Drug; Combo Route) route 4 (four) times daily.     BLOOD-GLUCOSE METER (TRUE METRIX GLUCOSE METER) KIT    Use as instructed to test blood glucose meter daily.    ELIQUIS 5 MG TAB    Take 1 tablet (5 mg total) by mouth 2 (two) times daily.    FLUTICASONE PROPIONATE (FLONASE) 50 MCG/ACTUATION NASAL SPRAY    2 sprays (100 mcg total) by Each Nostril route 2 (two) times daily.    FLUTICASONE-SALMETEROL DISKUS INHALER 250-50 MCG    Inhale 1 puff into the lungs 2 (two) times daily. Controller    FUROSEMIDE (LASIX) 40 MG TABLET    Take 1 tablet (40 mg total) by mouth 2 (two) times a day.    GABAPENTIN (NEURONTIN) 800 MG TABLET    Take 1 tablet (800 mg total) by mouth 3 (three) times daily.    INSULIN ASPART U-100 (NOVOLOG FLEXPEN U-100 INSULIN) 100 UNIT/ML (3 ML) INPN PEN    INJECT 10 UNITS SUBCUTANEOUSLY THREE TIMES DAILY WITH MEALS    IPRATROPIUM (ATROVENT) 0.02 % NEBULIZER SOLUTION    INHALE THE CONTENTS OF 1 VIAL VIA NEBULIZER FOUR TIMES DAILY    JARDIANCE 25 MG TABLET    TAKE 1 TABLET(25 MG) BY MOUTH EVERY DAY    LATANOPROST 0.005 % OPHTHALMIC SOLUTION    INSTILL 1 DROP INTO BOTH EYES ONE TIME DAILY. BOTTLE EXPIRES 42 DAYS AFTER OPENING    LEVOCETIRIZINE (XYZAL) 5 MG TABLET    Take 1 tablet (5 mg total) by mouth every evening.    MICRO THIN LANCETS 33 GAUGE MISC        NITROGLYCERIN 0.1 MG/HR TD PT24 (NITRODUR) 0.1 MG/HR PT24    Place 1 patch onto the skin once daily.    PANTOPRAZOLE (PROTONIX) 40 MG TABLET    Take 1 tablet (40 mg total) by mouth once daily.    RANOLAZINE (RANEXA) 1,000 MG TB12    Take 1 tablet (1,000 mg total) by mouth 2 (two) times daily.    SEMAGLUTIDE (OZEMPIC) 2 MG/DOSE (8 MG/3 ML) PNIJ    Inject 2 mg into the skin every 7 days.    SERTRALINE (ZOLOFT) 100 MG TABLET    Take 1 tablet (100 mg total) by mouth every evening.    TAMSULOSIN (FLOMAX) 0.4 MG CAP    Take 1 capsule (0.4 mg total) by mouth once daily.    TIOTROPIUM (SPIRIVA WITH HANDIHALER) 18 MCG INHALATION CAPSULE    INHALE THE CONTENTS OF 1 CAPSULE ONE TIME DAILY (CONTROLLER)    TRAMADOL  (ULTRAM) 50 MG TABLET    Take 1 tablet (50 mg total) by mouth every 8 (eight) hours as needed for Pain.    TRAZODONE (DESYREL) 100 MG TABLET    Take 2 tablets (200 mg total) by mouth every evening.    TRIAMCINOLONE ACETONIDE 0.1% (KENALOG) 0.1 % OINTMENT    Apply topically 2 (two) times daily. Use on legs and back    VIOS AEROSOL DELIVERY SYSTEM VIN    USE AS DIRESTED       Review of patient's allergies indicates:   Allergen Reactions    Protamine Hives     Urticaria, possible upper airway swelling       Past Surgical History:   Procedure Laterality Date    ABLATION OF ARRHYTHMOGENIC FOCUS FOR ATRIAL FIBRILLATION N/A 2/27/2020    Procedure: Ablation atrial fibrillation;  Surgeon: Gio Brown MD;  Location: [facility] EP LAB;  Service: Cardiology;  Laterality: N/A;  afib, DAMIEN (cx if SR), PVI, PITO, anes, MB, 3 Prep    CHOLECYSTECTOMY      CLOSURE OF WOUND Left 7/1/2020    Procedure: CLOSURE, WOUND;  Surgeon: Barb Veras DPM;  Location: [facility] OR;  Service: Podiatry;  Laterality: Left;    COLONOSCOPY N/A 3/4/2022    Procedure: COLONOSCOPY;  Surgeon: Yolis Freitas MD;  Location: [facility] ENDO;  Service: Endoscopy;  Laterality: N/A;    ESOPHAGOGASTRODUODENOSCOPY N/A 3/4/2022    Procedure: EGD (ESOPHAGOGASTRODUODENOSCOPY);  Surgeon: Yolis Freitas MD;  Location: [facility] ENDO;  Service: Endoscopy;  Laterality: N/A;    INJECTION OF ANESTHETIC AGENT INTO SACROILIAC JOINT Left 3/24/2021    Procedure: Left BLOCK, SACROILIAC JOINT and bilateral rhomboid TPI;  Surgeon: Dillan Tsai MD;  Location: [facility] PAIN MGT;  Service: Pain Management;  Laterality: Left;    INTRALUMINAL GASTROINTESTINAL TRACT IMAGING VIA CAPSULE N/A 3/22/2022    Procedure: IMAGING PROCEDURE, GI TRACT, INTRALUMINAL, VIA CAPSULE;  Surgeon: Justice Martinez RN;  Location: [facility] ENDO;  Service: Endoscopy;  Laterality: N/A;    REVERSE TOTAL SHOULDER ARTHROPLASTY Left 1/10/2022    Procedure: ARTHROPLASTY, SHOULDER, TOTAL, REVERSE;  Surgeon: Anthony VOGT  "MD Christiano;  Location: [facility] OR;  Service: Orthopedics;  Laterality: Left;  left reverse total shoulder arthroplasty     SELECTIVE INJECTION OF ANESTHETIC AGENT AROUND LUMBAR SPINAL NERVE ROOT BY TRANSFORAMINAL APPROACH Left 6/11/2020    Procedure: BLOCK, SPINAL NERVE ROOT, LUMBAR, SELECTIVE, TRANSFORAMINAL APPROACH Left L4-5, L5-S1 TFESI with RN IV sedation;  Surgeon: Dillan Tsai MD;  Location: [facility] PAIN MGT;  Service: Pain Management;  Laterality: Left;    TOE AMPUTATION Left 6/29/2020    Procedure: AMPUTATION, TOE;  Surgeon: Barb Veras DPM;  Location: [facility] OR;  Service: Podiatry;  Laterality: Left;  great toe       Family History   Problem Relation Age of Onset    Glaucoma Mother     Cataracts Mother     Diabetes Mother     Cataracts Father     Stroke Father     Glaucoma Sister         x3    Cataracts Sister         x3    Diabetes Sister         x1    Stroke Sister         x1    Glaucoma Maternal Aunt     Diabetes Maternal Aunt     No Known Problems Brother     No Known Problems Daughter     No Known Problems Son     Cancer Maternal Grandfather         lung (smoker)    Prostate cancer Neg Hx     Heart disease Neg Hx     Kidney disease Neg Hx        Social History     Socioeconomic History    Marital status: Legally     Number of children: 5   Occupational History    Occupation: disabled    Occupation: PT at [employer]    Tobacco Use    Smoking status: Never    Smokeless tobacco: Never   Substance and Sexual Activity    Alcohol use: Yes     Comment: rare, maybe holidays    Drug use: Not Currently     Types: Cocaine    Sexual activity: Not Currently     Partners: Female   Social History Narrative    Utilizing [employer] transportation which has been unreliable.        Vitals:    09/28/22 1044   Weight: 86.2 kg (190 lb)   Height: 5' 5" (1.651 m)   PainSc: 0-No pain   PainLoc: Foot       Hemoglobin A1C   Date Value Ref Range Status   07/31/2022 6.0 (H) 4.0 - 5.6 % Final     Comment:     " ADA Screening Guidelines:  5.7-6.4%  Consistent with prediabetes  >or=6.5%  Consistent with diabetes    High levels of fetal hemoglobin interfere with the HbA1C  assay. Heterozygous hemoglobin variants (HbS, HgC, etc)do  not significantly interfere with this assay.   However, presence of multiple variants may affect accuracy.     06/10/2022 6.4 (H) 4.0 - 5.6 % Final     Comment:     ADA Screening Guidelines:  5.7-6.4%  Consistent with prediabetes  >or=6.5%  Consistent with diabetes    High levels of fetal hemoglobin interfere with the HbA1C  assay. Heterozygous hemoglobin variants (HbS, HgC, etc)do  not significantly interfere with this assay.   However, presence of multiple variants may affect accuracy.     03/03/2022 6.1 (H) 4.0 - 5.6 % Final     Comment:     ADA Screening Guidelines:  5.7-6.4%  Consistent with prediabetes  >or=6.5%  Consistent with diabetes    High levels of fetal hemoglobin interfere with the HbA1C  assay. Heterozygous hemoglobin variants (HbS, HgC, etc)do  not significantly interfere with this assay.   However, presence of multiple variants may affect accuracy.         Review of Systems   Constitutional:  Negative for chills and fever.   Respiratory:  Negative for shortness of breath.    Cardiovascular:  Negative for chest pain, palpitations, orthopnea, claudication and leg swelling.   Gastrointestinal:  Negative for diarrhea, nausea and vomiting.   Musculoskeletal:  Negative for joint pain.   Skin:  Negative for rash.   Neurological:  Positive for tingling and sensory change.   Psychiatric/Behavioral: Negative.             Objective:      PHYSICAL EXAM: Apperance: Alert and orient in no distress,well developed, and with good attention to grooming and body habits  Patient presents ambulating in diabetic shoes and inserts.   LOWER EXTREMITY EXAM:  VASCULAR: Dorsalis pedis pulses 2/4 bilateral and Posterior Tibial pulses 2/4 bilateral. Capillary fill time <4 seconds bilateral. No edema observed  bilateral. Varicosities absent bilateral. Skin temperature of the lower extremities is warm to warm, proximal to distal. Hair growth dim bilateral.  DERMATOLOGICAL: No skin rashes, subcutaneous nodules, lesions, or ulcers observed bilateral. Nails 1,2,3,4,5 right and 2,3,4,5 left elongated, thickened, and discolored with subungual debris. Webspaces 1,2,3,4 bilateral clean, dry and without evidence of break in skin integrity. Mild hyperkeratotic tissue noted to left medial 1st MPJ and distal hallux amputation stump.   NEUROLOGICAL: Light touch, sharp-dull, proprioception all present and equal bilaterally.  Vibratory sensation diminished at bilateral hallux. Protective sensation absent sites as tested with a [place]-Kimmie 5.07 monofilament.   MUSCULOSKELETAL: Muscle strength is 5/5 for foot inverters, everters, plantarflexors, and dorsiflexors. Muscle tone is normal. Left hallux amputation noted.           Assessment:       ICD-10-CM ICD-9-CM   1. Type II diabetes mellitus with neurological manifestations  E11.49 250.60   2. Pre-ulcerative calluses - Left Foot  L84 700   3. Dermatophytosis of nail  B35.1 110.1         Plan:   Type II diabetes mellitus with neurological manifestations    Pre-ulcerative calluses - Left Foot    Dermatophytosis of nail      I counseled the patient on his conditions, regarding findings of my examination, my impressions, and usual treatment plan.   Greater than 15 minutes of a 20 minute appointment spent on education about the diabetic foot, neuropathy, and prevention of limb loss.  Shoe inspection. Diabetic Foot Education. Patient reminded of the importance of good nutrition and blood sugar control to help prevent podiatric complications of diabetes. Patient instructed on proper foot hygeine. We discussed wearing proper shoe gear, daily foot inspections, never walking without protective shoe gear, never putting sharp instruments to feet.    With patient's permission, nails 1-5 bilateral  were debrided/trimmed in length and thickness aggressively to their soft tissue attachment mechanically and with electric , removing all offending nail and debris. Patient relates relief following the procedure.  With patient's permission, left foot callus trimmed in thickness with #15 blade in thickness without incident.   Patient  will continue to monitor the areas daily, inspect feet, wear protective shoe gear when ambulatory, moisturizer to maintain skin integrity. Patient reminded of the importance of good nutrition and blood sugar control to help prevent podiatric complications of diabetes.  Patient to return 4 months or sooner if needed.          Barb Veras DPM  [facility]

## 2022-10-10 ENCOUNTER — TELEPHONE (OUTPATIENT)
Dept: CARDIOLOGY | Facility: CLINIC | Age: 67
End: 2022-10-10
Payer: MEDICARE

## 2022-10-10 NOTE — TELEPHONE ENCOUNTER
Patient was scheduled for Cardiomems at request of Clinic Staff however patient stated he was not aware this morning and will not be coming in.

## 2022-10-10 NOTE — TELEPHONE ENCOUNTER
Pt has been contacted multiple times regarding missed appt and signing consents for cardiomems procedure.   No answer.   LVM for pt to return call ASAP

## 2022-10-12 NOTE — PROGRESS NOTES
Outpatient Care Management   - Care Plan Follow Up    Patient: Crow Green  MRN:  [id number]  Date of Service:  10/12/2022  Completed by:  Briana Tripp LMSW  Referral Date: 06/28/2022    Reason for Visit   Patient presents with    OPCM Chart Review    OPCM  First Follow-up Attempt     9/29/2022    OPCM  Second Follow-up Attempt     10/5/2022    Update Plan Of Care     10/12/22       Brief Summary: Pt had reached out to  yesterday regarding needing housekeeping services. He stated he is waiting to hear back from [facility] regarding reason why they stopped providing this for him. Pt inquired about his insurance covering it. SW looked into pt's [employer] plan and does have some personal home care services coverage.  gave pt the contact number to inquire about this. Pt has not received rollator/hospital bed mattress. Pt's PCP has ordered it.  will contact [facility] regarding this.  will follow up in 2 weeks.     Complex Care Plan     Care plan was discussed and completed today with input from patient and/or caregiver.    Patient Instructions     Follow up in about 27 days (around 10/26/2022).    Todays OPCM Self-Management Care Plan was developed with the patients/caregivers input and was based on identified barriers from todays assessment.  Goals were written today with the patient/caregiver and the patient has agreed to work towards these goals to improve his/her overall well-being. Patient verbalized understanding of the care plan, goals, and all of today's instructions. Encouraged patient/caregiver to communicate with his/her physician and health care team about health conditions and the treatment plan.  Provided my contact information today and encouraged patient/caregiver to call me with any questions as needed.

## 2022-10-17 ENCOUNTER — OFFICE VISIT (OUTPATIENT)
Dept: CARDIOLOGY | Facility: CLINIC | Age: 67
End: 2022-10-17
Payer: MEDICARE

## 2022-10-17 VITALS
BODY MASS INDEX: 30.64 KG/M2 | HEIGHT: 65 IN | SYSTOLIC BLOOD PRESSURE: 140 MMHG | DIASTOLIC BLOOD PRESSURE: 80 MMHG | HEART RATE: 70 BPM | WEIGHT: 183.88 LBS

## 2022-10-17 DIAGNOSIS — I50.42 CHRONIC COMBINED SYSTOLIC AND DIASTOLIC HEART FAILURE: Primary | ICD-10-CM

## 2022-10-17 PROCEDURE — 3066F NEPHROPATHY DOC TX: CPT | Mod: CPTII,S$GLB,, | Performed by: PHYSICIAN ASSISTANT

## 2022-10-17 PROCEDURE — 3062F PR POS MACROALBUMINURIA RESULT DOCUMENTED/REVIEW: ICD-10-PCS | Mod: CPTII,S$GLB,, | Performed by: PHYSICIAN ASSISTANT

## 2022-10-17 PROCEDURE — 99213 PR OFFICE/OUTPT VISIT, EST, LEVL III, 20-29 MIN: ICD-10-PCS | Mod: S$GLB,,, | Performed by: PHYSICIAN ASSISTANT

## 2022-10-17 PROCEDURE — 3066F PR DOCUMENTATION OF TREATMENT FOR NEPHROPATHY: ICD-10-PCS | Mod: CPTII,S$GLB,, | Performed by: PHYSICIAN ASSISTANT

## 2022-10-17 PROCEDURE — 3079F PR MOST RECENT DIASTOLIC BLOOD PRESSURE 80-89 MM HG: ICD-10-PCS | Mod: CPTII,S$GLB,, | Performed by: PHYSICIAN ASSISTANT

## 2022-10-17 PROCEDURE — 3077F SYST BP >= 140 MM HG: CPT | Mod: CPTII,S$GLB,, | Performed by: PHYSICIAN ASSISTANT

## 2022-10-17 PROCEDURE — 1159F PR MEDICATION LIST DOCUMENTED IN MEDICAL RECORD: ICD-10-PCS | Mod: CPTII,S$GLB,, | Performed by: PHYSICIAN ASSISTANT

## 2022-10-17 PROCEDURE — 3077F PR MOST RECENT SYSTOLIC BLOOD PRESSURE >= 140 MM HG: ICD-10-PCS | Mod: CPTII,S$GLB,, | Performed by: PHYSICIAN ASSISTANT

## 2022-10-17 PROCEDURE — 3062F POS MACROALBUMINURIA REV: CPT | Mod: CPTII,S$GLB,, | Performed by: PHYSICIAN ASSISTANT

## 2022-10-17 PROCEDURE — 1160F RVW MEDS BY RX/DR IN RCRD: CPT | Mod: CPTII,S$GLB,, | Performed by: PHYSICIAN ASSISTANT

## 2022-10-17 PROCEDURE — 4010F PR ACE/ARB THEARPY RXD/TAKEN: ICD-10-PCS | Mod: CPTII,S$GLB,, | Performed by: PHYSICIAN ASSISTANT

## 2022-10-17 PROCEDURE — 3079F DIAST BP 80-89 MM HG: CPT | Mod: CPTII,S$GLB,, | Performed by: PHYSICIAN ASSISTANT

## 2022-10-17 PROCEDURE — 3044F HG A1C LEVEL LT 7.0%: CPT | Mod: CPTII,S$GLB,, | Performed by: PHYSICIAN ASSISTANT

## 2022-10-17 PROCEDURE — 99999 PR PBB SHADOW E&M-EST. PATIENT-LVL IV: ICD-10-PCS | Mod: PBBFAC,,, | Performed by: PHYSICIAN ASSISTANT

## 2022-10-17 PROCEDURE — 99213 OFFICE O/P EST LOW 20 MIN: CPT | Mod: S$GLB,,, | Performed by: PHYSICIAN ASSISTANT

## 2022-10-17 PROCEDURE — 1159F MED LIST DOCD IN RCRD: CPT | Mod: CPTII,S$GLB,, | Performed by: PHYSICIAN ASSISTANT

## 2022-10-17 PROCEDURE — 99999 PR PBB SHADOW E&M-EST. PATIENT-LVL IV: CPT | Mod: PBBFAC,,, | Performed by: PHYSICIAN ASSISTANT

## 2022-10-17 PROCEDURE — 4010F ACE/ARB THERAPY RXD/TAKEN: CPT | Mod: CPTII,S$GLB,, | Performed by: PHYSICIAN ASSISTANT

## 2022-10-17 PROCEDURE — 1160F PR REVIEW ALL MEDS BY PRESCRIBER/CLIN PHARMACIST DOCUMENTED: ICD-10-PCS | Mod: CPTII,S$GLB,, | Performed by: PHYSICIAN ASSISTANT

## 2022-10-17 PROCEDURE — 3044F PR MOST RECENT HEMOGLOBIN A1C LEVEL <7.0%: ICD-10-PCS | Mod: CPTII,S$GLB,, | Performed by: PHYSICIAN ASSISTANT

## 2022-10-18 ENCOUNTER — OFFICE VISIT (OUTPATIENT)
Dept: OPHTHALMOLOGY | Facility: CLINIC | Age: 67
End: 2022-10-18
Payer: MEDICARE

## 2022-10-18 DIAGNOSIS — Z79.4 TYPE 2 DIABETES MELLITUS WITH BOTH EYES AFFECTED BY MILD NONPROLIFERATIVE RETINOPATHY WITHOUT MACULAR EDEMA, WITH LONG-TERM CURRENT USE OF INSULIN: Primary | ICD-10-CM

## 2022-10-18 DIAGNOSIS — E11.36 DIABETIC CATARACT: ICD-10-CM

## 2022-10-18 DIAGNOSIS — H40.1132 PRIMARY OPEN ANGLE GLAUCOMA (POAG) OF BOTH EYES, MODERATE STAGE: ICD-10-CM

## 2022-10-18 DIAGNOSIS — E11.3293 TYPE 2 DIABETES MELLITUS WITH BOTH EYES AFFECTED BY MILD NONPROLIFERATIVE RETINOPATHY WITHOUT MACULAR EDEMA, WITH LONG-TERM CURRENT USE OF INSULIN: Primary | ICD-10-CM

## 2022-10-18 PROCEDURE — 92014 COMPRE OPH EXAM EST PT 1/>: CPT | Mod: S$GLB,,, | Performed by: OPHTHALMOLOGY

## 2022-10-18 PROCEDURE — 3066F NEPHROPATHY DOC TX: CPT | Mod: CPTII,S$GLB,, | Performed by: OPHTHALMOLOGY

## 2022-10-18 PROCEDURE — 3044F PR MOST RECENT HEMOGLOBIN A1C LEVEL <7.0%: ICD-10-PCS | Mod: CPTII,S$GLB,, | Performed by: OPHTHALMOLOGY

## 2022-10-18 PROCEDURE — 3066F PR DOCUMENTATION OF TREATMENT FOR NEPHROPATHY: ICD-10-PCS | Mod: CPTII,S$GLB,, | Performed by: OPHTHALMOLOGY

## 2022-10-18 PROCEDURE — 92014 PR EYE EXAM, EST PATIENT,COMPREHESV: ICD-10-PCS | Mod: S$GLB,,, | Performed by: OPHTHALMOLOGY

## 2022-10-18 PROCEDURE — 99999 PR PBB SHADOW E&M-EST. PATIENT-LVL III: ICD-10-PCS | Mod: PBBFAC,,, | Performed by: OPHTHALMOLOGY

## 2022-10-18 PROCEDURE — 1160F RVW MEDS BY RX/DR IN RCRD: CPT | Mod: CPTII,S$GLB,, | Performed by: OPHTHALMOLOGY

## 2022-10-18 PROCEDURE — 92134 POSTERIOR SEGMENT OCT RETINA (OCULAR COHERENCE TOMOGRAPHY)-BOTH EYES: ICD-10-PCS | Mod: S$GLB,,, | Performed by: OPHTHALMOLOGY

## 2022-10-18 PROCEDURE — 1160F PR REVIEW ALL MEDS BY PRESCRIBER/CLIN PHARMACIST DOCUMENTED: ICD-10-PCS | Mod: CPTII,S$GLB,, | Performed by: OPHTHALMOLOGY

## 2022-10-18 PROCEDURE — 4010F ACE/ARB THERAPY RXD/TAKEN: CPT | Mod: CPTII,S$GLB,, | Performed by: OPHTHALMOLOGY

## 2022-10-18 PROCEDURE — 1159F PR MEDICATION LIST DOCUMENTED IN MEDICAL RECORD: ICD-10-PCS | Mod: CPTII,S$GLB,, | Performed by: OPHTHALMOLOGY

## 2022-10-18 PROCEDURE — 4010F PR ACE/ARB THEARPY RXD/TAKEN: ICD-10-PCS | Mod: CPTII,S$GLB,, | Performed by: OPHTHALMOLOGY

## 2022-10-18 PROCEDURE — 3062F POS MACROALBUMINURIA REV: CPT | Mod: CPTII,S$GLB,, | Performed by: OPHTHALMOLOGY

## 2022-10-18 PROCEDURE — 3044F HG A1C LEVEL LT 7.0%: CPT | Mod: CPTII,S$GLB,, | Performed by: OPHTHALMOLOGY

## 2022-10-18 PROCEDURE — 1159F MED LIST DOCD IN RCRD: CPT | Mod: CPTII,S$GLB,, | Performed by: OPHTHALMOLOGY

## 2022-10-18 PROCEDURE — 92134 CPTRZ OPH DX IMG PST SGM RTA: CPT | Mod: S$GLB,,, | Performed by: OPHTHALMOLOGY

## 2022-10-18 PROCEDURE — 3062F PR POS MACROALBUMINURIA RESULT DOCUMENTED/REVIEW: ICD-10-PCS | Mod: CPTII,S$GLB,, | Performed by: OPHTHALMOLOGY

## 2022-10-18 PROCEDURE — 99999 PR PBB SHADOW E&M-EST. PATIENT-LVL III: CPT | Mod: PBBFAC,,, | Performed by: OPHTHALMOLOGY

## 2022-10-18 NOTE — PROGRESS NOTES
[facility] Provider Note (Follow-up) Consult Note      HPI:  Patient can walk to clinic without SOB   Patient sleeps on 2 pillows   Patient wakes up SOB, has to get out of bed, associated cough, sputum denies   Palpitations - none   Dizzy, light-headed, pre-syncope or syncope none   Since discharge frequency of performing weights, home weight and weight change-is not getting daily home weight   Other information felt pertinent to HPI    Since last visit, pt unfortunately missed his appt and went back to ED with chest pain. He was then scheduled to come in and do cardiomems consents but missed as well. Is unsure of medications. Did not have labs today. No more CP, no SOB, PND, orthopnea. Spoke at length on the need for compliance especially with cardiomems as we would need to be able to get int ouch with him for medication adjustments.      PHYSICAL:   Vitals:    10/17/22 0912   BP: (!) 140/80   Pulse: 70      Wt Readings from Last 3 Encounters:   10/17/22 83.4 kg (183 lb 13.8 oz)   09/28/22 86.2 kg (190 lb)   09/26/22 86.4 kg (190 lb 7.6 oz)       JVD: no   Heart rhythm: regular  Cardiac murmur: No    S3: no  S4: no  Lungs: clear  Liver span: 10 cm:   Hepatojugular reflux: no  Edema: no      ASSESSMENT: acute on chronic systolic HF    PLAN:      Patient Instructions:   Instruct the patient to notify this clinic if HH, a physician or an advanced care provider wants to change medication one of their HF medications   Activity and Diet restrictions:   Recommend 2-3 gram sodium restriction and 1500cc- 2000cc fluid restriction.  Encourage physical activity with graded exercise program.  Requested patient to weigh themselves daily, and to notify us if their weight increases by more than 3 lbs in 1 day or 5 lbs in 3 days.    Assigned dry weight on home scale: 83 kg  Medication changes (include current dose and changed dose)  Pt will not be a good cardiomems candidate at this point with multiple missed appointments and inability  to reach via phone.  Continue lasix BID, would like to get labs today  Upcoming labs and date anticipated: RTC 1 week

## 2022-10-18 NOTE — PROGRESS NOTES
===============================  Date today is 10/18/2022  Crow Green is a 67 y.o. male  Last visit [facility]: :1/18/2022   Last visit eye dept. Visit date not found    Uncorrected distance visual acuity was 20/40 in the right eye and 20/40 in the left eye.  Tonometry       Tonometry (Applanation, 10:20 AM)         Right Left    Pressure 25 23              Max Pressure         Right Left    Max 33 (10/22/15) 26 (10/22/15)                  Not recorded       Not recorded       Not recorded       Chief Complaint   Patient presents with    Diabetic Eye Exam     Here for dm eye exam and iop ck     HPI     Diabetic Eye Exam     Additional comments: Here for dm eye exam and iop ck           Comments    1. DM with BDR and ME   2. Cataracts OU   3. COAG OU    OU- Latanoprost          Last edited by TIGRE Davenport on 10/18/2022 10:06 AM.      Problem List Items Addressed This Visit          Eye/Vision problems    Type 2 diabetes mellitus - Primary    Overview     Patient reports that he has been compliant with medications and diet, last HA1c was 6.1 on 3/3.          Relevant Orders    Posterior Segment OCT Retina-Both eyes (Completed)     Other Visit Diagnoses       Diabetic cataract        Primary open angle glaucoma (POAG) of both eyes, moderate stage              Instructed to call 24/7 for any worsening of vision, visual distortion or pain.  Check OU independently daily.    Gave my office and personal cell phone number.  ________________  10/18/2022 today  Crow Green  :  DM with mild BDR  OCT ok  1-2+ cortical cataract OU  COAG  IOP up today 25-23 with -6 w/CCT  Recommend eval with Dr. Mitchell next visit with a visual field  Continue drops for now  C/d 0.6    RTC 3-4 months with 24-2 VF and glaucoma eval with Dr. Mitchell  Instructed to call 24/7 for any worsening of vision or symptoms. Check OU daily.   Gave my office and cell phone number.    =============================

## 2022-10-19 ENCOUNTER — EXTERNAL HOME HEALTH (OUTPATIENT)
Dept: HOME HEALTH SERVICES | Facility: HOSPITAL | Age: 67
End: 2022-10-19
Payer: MEDICARE

## 2022-10-24 ENCOUNTER — TELEPHONE (OUTPATIENT)
Dept: OPHTHALMOLOGY | Facility: CLINIC | Age: 67
End: 2022-10-24
Payer: MEDICARE

## 2022-10-24 ENCOUNTER — LAB VISIT (OUTPATIENT)
Dept: LAB | Facility: HOSPITAL | Age: 67
End: 2022-10-24
Attending: PHYSICIAN ASSISTANT
Payer: MEDICARE

## 2022-10-24 ENCOUNTER — OFFICE VISIT (OUTPATIENT)
Dept: CARDIOLOGY | Facility: CLINIC | Age: 67
End: 2022-10-24
Payer: MEDICARE

## 2022-10-24 VITALS
HEIGHT: 65 IN | HEART RATE: 72 BPM | BODY MASS INDEX: 32.58 KG/M2 | SYSTOLIC BLOOD PRESSURE: 126 MMHG | WEIGHT: 195.56 LBS | DIASTOLIC BLOOD PRESSURE: 70 MMHG

## 2022-10-24 DIAGNOSIS — I50.42 CHRONIC COMBINED SYSTOLIC AND DIASTOLIC CONGESTIVE HEART FAILURE: ICD-10-CM

## 2022-10-24 DIAGNOSIS — I50.42 CHRONIC COMBINED SYSTOLIC AND DIASTOLIC HEART FAILURE: Primary | ICD-10-CM

## 2022-10-24 DIAGNOSIS — I50.42 CHRONIC COMBINED SYSTOLIC AND DIASTOLIC HEART FAILURE: ICD-10-CM

## 2022-10-24 LAB
ALBUMIN SERPL BCP-MCNC: 2.7 G/DL (ref 3.5–5.2)
ALP SERPL-CCNC: 65 U/L (ref 55–135)
ALT SERPL W/O P-5'-P-CCNC: 12 U/L (ref 10–44)
ANION GAP SERPL CALC-SCNC: 13 MMOL/L (ref 8–16)
AST SERPL-CCNC: 17 U/L (ref 10–40)
BILIRUB SERPL-MCNC: 0.5 MG/DL (ref 0.1–1)
BNP SERPL-MCNC: 230 PG/ML (ref 0–99)
BUN SERPL-MCNC: 14 MG/DL (ref 8–23)
CALCIUM SERPL-MCNC: 8.8 MG/DL (ref 8.7–10.5)
CHLORIDE SERPL-SCNC: 104 MMOL/L (ref 95–110)
CO2 SERPL-SCNC: 25 MMOL/L (ref 23–29)
CREAT SERPL-MCNC: 1.1 MG/DL (ref 0.5–1.4)
EST. GFR  (NO RACE VARIABLE): >60 ML/MIN/1.73 M^2
GLUCOSE SERPL-MCNC: 130 MG/DL (ref 70–110)
POTASSIUM SERPL-SCNC: 4 MMOL/L (ref 3.5–5.1)
PROT SERPL-MCNC: 7.3 G/DL (ref 6–8.4)
SODIUM SERPL-SCNC: 142 MMOL/L (ref 136–145)

## 2022-10-24 PROCEDURE — 4010F PR ACE/ARB THEARPY RXD/TAKEN: ICD-10-PCS | Mod: CPTII,S$GLB,, | Performed by: PHYSICIAN ASSISTANT

## 2022-10-24 PROCEDURE — 3078F DIAST BP <80 MM HG: CPT | Mod: CPTII,S$GLB,, | Performed by: PHYSICIAN ASSISTANT

## 2022-10-24 PROCEDURE — 99213 PR OFFICE/OUTPT VISIT, EST, LEVL III, 20-29 MIN: ICD-10-PCS | Mod: S$GLB,,, | Performed by: PHYSICIAN ASSISTANT

## 2022-10-24 PROCEDURE — 3044F PR MOST RECENT HEMOGLOBIN A1C LEVEL <7.0%: ICD-10-PCS | Mod: CPTII,S$GLB,, | Performed by: PHYSICIAN ASSISTANT

## 2022-10-24 PROCEDURE — 3044F HG A1C LEVEL LT 7.0%: CPT | Mod: CPTII,S$GLB,, | Performed by: PHYSICIAN ASSISTANT

## 2022-10-24 PROCEDURE — 3062F PR POS MACROALBUMINURIA RESULT DOCUMENTED/REVIEW: ICD-10-PCS | Mod: CPTII,S$GLB,, | Performed by: PHYSICIAN ASSISTANT

## 2022-10-24 PROCEDURE — 99999 PR PBB SHADOW E&M-EST. PATIENT-LVL III: CPT | Mod: PBBFAC,,, | Performed by: PHYSICIAN ASSISTANT

## 2022-10-24 PROCEDURE — 3066F PR DOCUMENTATION OF TREATMENT FOR NEPHROPATHY: ICD-10-PCS | Mod: CPTII,S$GLB,, | Performed by: PHYSICIAN ASSISTANT

## 2022-10-24 PROCEDURE — 36415 COLL VENOUS BLD VENIPUNCTURE: CPT | Performed by: PHYSICIAN ASSISTANT

## 2022-10-24 PROCEDURE — 3078F PR MOST RECENT DIASTOLIC BLOOD PRESSURE < 80 MM HG: ICD-10-PCS | Mod: CPTII,S$GLB,, | Performed by: PHYSICIAN ASSISTANT

## 2022-10-24 PROCEDURE — 99999 PR PBB SHADOW E&M-EST. PATIENT-LVL III: ICD-10-PCS | Mod: PBBFAC,,, | Performed by: PHYSICIAN ASSISTANT

## 2022-10-24 PROCEDURE — 99213 OFFICE O/P EST LOW 20 MIN: CPT | Mod: S$GLB,,, | Performed by: PHYSICIAN ASSISTANT

## 2022-10-24 PROCEDURE — 3074F PR MOST RECENT SYSTOLIC BLOOD PRESSURE < 130 MM HG: ICD-10-PCS | Mod: CPTII,S$GLB,, | Performed by: PHYSICIAN ASSISTANT

## 2022-10-24 PROCEDURE — 1160F PR REVIEW ALL MEDS BY PRESCRIBER/CLIN PHARMACIST DOCUMENTED: ICD-10-PCS | Mod: CPTII,S$GLB,, | Performed by: PHYSICIAN ASSISTANT

## 2022-10-24 PROCEDURE — 1159F MED LIST DOCD IN RCRD: CPT | Mod: CPTII,S$GLB,, | Performed by: PHYSICIAN ASSISTANT

## 2022-10-24 PROCEDURE — 4010F ACE/ARB THERAPY RXD/TAKEN: CPT | Mod: CPTII,S$GLB,, | Performed by: PHYSICIAN ASSISTANT

## 2022-10-24 PROCEDURE — 83880 ASSAY OF NATRIURETIC PEPTIDE: CPT | Performed by: PHYSICIAN ASSISTANT

## 2022-10-24 PROCEDURE — 1160F RVW MEDS BY RX/DR IN RCRD: CPT | Mod: CPTII,S$GLB,, | Performed by: PHYSICIAN ASSISTANT

## 2022-10-24 PROCEDURE — 3066F NEPHROPATHY DOC TX: CPT | Mod: CPTII,S$GLB,, | Performed by: PHYSICIAN ASSISTANT

## 2022-10-24 PROCEDURE — 80053 COMPREHEN METABOLIC PANEL: CPT | Performed by: PHYSICIAN ASSISTANT

## 2022-10-24 PROCEDURE — 3062F POS MACROALBUMINURIA REV: CPT | Mod: CPTII,S$GLB,, | Performed by: PHYSICIAN ASSISTANT

## 2022-10-24 PROCEDURE — 3074F SYST BP LT 130 MM HG: CPT | Mod: CPTII,S$GLB,, | Performed by: PHYSICIAN ASSISTANT

## 2022-10-24 PROCEDURE — 1159F PR MEDICATION LIST DOCUMENTED IN MEDICAL RECORD: ICD-10-PCS | Mod: CPTII,S$GLB,, | Performed by: PHYSICIAN ASSISTANT

## 2022-10-24 RX ORDER — SACUBITRIL AND VALSARTAN 24; 26 MG/1; MG/1
1 TABLET, FILM COATED ORAL 2 TIMES DAILY
Qty: 120 TABLET | Refills: 1 | Status: SHIPPED | OUTPATIENT
Start: 2022-10-24 | End: 2023-01-11

## 2022-10-24 RX ORDER — FUROSEMIDE 40 MG/1
80 TABLET ORAL 2 TIMES DAILY
Qty: 60 TABLET | Refills: 3
Start: 2022-10-24 | End: 2023-01-04

## 2022-10-24 RX ORDER — PEN NEEDLE, DIABETIC 30 GX3/16"
1 NEEDLE, DISPOSABLE MISCELLANEOUS 3 TIMES DAILY
Qty: 300 EACH | Refills: 3 | Status: SHIPPED | OUTPATIENT
Start: 2022-10-24

## 2022-10-24 NOTE — TELEPHONE ENCOUNTER
No answer/ left msg for patient to call back in regards to getting copy of his Mrx      ----- Message from Izzy Mtz sent at 10/24/2022 12:50 PM CDT -----  Pt stated he broke his glasses and need a replacement pair. Call back number is [phone]. Thx. EL

## 2022-10-24 NOTE — TELEPHONE ENCOUNTER
----- Message from Yenifer Rea sent at 10/24/2022  1:30 PM CDT -----  Contact: [phone]  Requesting an RX refill or new RX.  Is this a refill or new RX: refill  RX name and strength (copy/paste from chart):  insulin needles (did not see anything in chart)  Is this a 30 day or 90 day RX: 90  Pharmacy name and phone # (copy/paste from chart):    [id number] - [place] - [address]  [address] [place] [address]  Phone: [phone] Fax: [phone]  The doctors have asked that we provide their patients with the following 2 reminders -- prescription refills can take up to 72 hours, and a friendly reminder that in the future you can use your [facility] account to request refills: yes    Pt has trouble answering his phone. He cannot slide it up to answer, if you call and he does not answer please call right back b/c it takes him multiple tries to make it work.

## 2022-10-24 NOTE — PROGRESS NOTES
[facility] Provider Note (Follow-up) Consult Note      HPI:  Patient can walk to clinic but gets SOB with exertion    Patient sleeps on 2 pillows   Patient wakes up SOB, has to get out of bed, associated cough, sputum denies   Palpitations - none   Dizzy, light-headed, pre-syncope or syncope none   Since discharge frequency of performing weights, home weight and weight change weight up at home 10 pounds   Other information felt pertinent to HPI    Since last visit one week ago, weight is up at home. Admits to eating gumbo and drinking more fluids. On lasix 40 BID. Did not get his labs last week after clinic. Has more Le edema and abdominal distension.      PHYSICAL:   Vitals:    10/24/22 0856   BP: 126/70   Pulse: 72      Wt Readings from Last 3 Encounters:   10/24/22 88.7 kg (195 lb 8.8 oz)   10/17/22 83.4 kg (183 lb 13.8 oz)   09/28/22 86.2 kg (190 lb)       JVD: yes   Heart rhythm: regular  Cardiac murmur: No    S3: yes  S4: no  Lungs: clear  Liver span: 16 cm:   Hepatojugular reflux: yes  Edema: yes, 1+      ASSESSMENT: acute on chronic systolic HF    PLAN:      Patient Instructions:   Instruct the patient to notify this clinic if HH, a physician or an advanced care provider wants to change medication one of their HF medications   Activity and Diet restrictions:   Recommend 2-3 gram sodium restriction and 1500cc- 2000cc fluid restriction.  Encourage physical activity with graded exercise program.  Requested patient to weigh themselves daily, and to notify us if their weight increases by more than 3 lbs in 1 day or 5 lbs in 3 days.    Assigned dry weight on home scale: 83 kg  Medication changes (include current dose and changed dose)  Stop norvasc  Start entresto 24-26 BID  Increase lasix to 80 BID  Upcoming labs and date anticipated: RTC 1 week with labs

## 2022-10-26 ENCOUNTER — OUTPATIENT CASE MANAGEMENT (OUTPATIENT)
Dept: ADMINISTRATIVE | Facility: OTHER | Age: 67
End: 2022-10-26
Payer: MEDICARE

## 2022-10-27 ENCOUNTER — OFFICE VISIT (OUTPATIENT)
Dept: INTERNAL MEDICINE | Facility: CLINIC | Age: 67
End: 2022-10-27
Payer: MEDICARE

## 2022-10-27 ENCOUNTER — TELEPHONE (OUTPATIENT)
Dept: DIABETES | Facility: CLINIC | Age: 67
End: 2022-10-27
Payer: MEDICARE

## 2022-10-27 VITALS
OXYGEN SATURATION: 96 % | HEIGHT: 65 IN | BODY MASS INDEX: 31.22 KG/M2 | DIASTOLIC BLOOD PRESSURE: 72 MMHG | TEMPERATURE: 96 F | WEIGHT: 187.38 LBS | HEART RATE: 89 BPM | SYSTOLIC BLOOD PRESSURE: 122 MMHG

## 2022-10-27 DIAGNOSIS — M25.512 CHRONIC PAIN OF BOTH SHOULDERS: ICD-10-CM

## 2022-10-27 DIAGNOSIS — I25.110 ATHEROSCLEROSIS OF NATIVE CORONARY ARTERY OF NATIVE HEART WITH UNSTABLE ANGINA PECTORIS: ICD-10-CM

## 2022-10-27 DIAGNOSIS — R39.12 BENIGN PROSTATIC HYPERPLASIA WITH WEAK URINARY STREAM: ICD-10-CM

## 2022-10-27 DIAGNOSIS — M25.511 CHRONIC PAIN OF BOTH SHOULDERS: ICD-10-CM

## 2022-10-27 DIAGNOSIS — I70.0 ATHEROSCLEROSIS OF AORTA: ICD-10-CM

## 2022-10-27 DIAGNOSIS — I10 ESSENTIAL HYPERTENSION: ICD-10-CM

## 2022-10-27 DIAGNOSIS — Z79.4 TYPE 2 DIABETES MELLITUS WITH MILD NONPROLIFERATIVE RETINOPATHY AND MACULAR EDEMA, WITH LONG-TERM CURRENT USE OF INSULIN, UNSPECIFIED LATERALITY: ICD-10-CM

## 2022-10-27 DIAGNOSIS — G89.29 CHRONIC PAIN OF BOTH SHOULDERS: ICD-10-CM

## 2022-10-27 DIAGNOSIS — M54.16 LUMBAR RADICULOPATHY: ICD-10-CM

## 2022-10-27 DIAGNOSIS — E11.3293 MILD NONPROLIFERATIVE DIABETIC RETINOPATHY OF BOTH EYES WITHOUT MACULAR EDEMA ASSOCIATED WITH TYPE 2 DIABETES MELLITUS: ICD-10-CM

## 2022-10-27 DIAGNOSIS — E11.3219 TYPE 2 DIABETES MELLITUS WITH MILD NONPROLIFERATIVE RETINOPATHY AND MACULAR EDEMA, WITH LONG-TERM CURRENT USE OF INSULIN, UNSPECIFIED LATERALITY: ICD-10-CM

## 2022-10-27 DIAGNOSIS — Z89.412 ACQUIRED ABSENCE OF LEFT GREAT TOE: ICD-10-CM

## 2022-10-27 DIAGNOSIS — G89.29 OTHER CHRONIC PAIN: ICD-10-CM

## 2022-10-27 DIAGNOSIS — Z12.5 SCREENING FOR PROSTATE CANCER: ICD-10-CM

## 2022-10-27 DIAGNOSIS — N40.1 BENIGN PROSTATIC HYPERPLASIA WITH WEAK URINARY STREAM: ICD-10-CM

## 2022-10-27 DIAGNOSIS — I50.42 CHRONIC COMBINED SYSTOLIC AND DIASTOLIC HEART FAILURE: ICD-10-CM

## 2022-10-27 DIAGNOSIS — M54.32 LEFT SIDED SCIATICA: Primary | ICD-10-CM

## 2022-10-27 PROCEDURE — 3074F PR MOST RECENT SYSTOLIC BLOOD PRESSURE < 130 MM HG: ICD-10-PCS | Mod: CPTII,S$GLB,, | Performed by: PHYSICIAN ASSISTANT

## 2022-10-27 PROCEDURE — 3066F PR DOCUMENTATION OF TREATMENT FOR NEPHROPATHY: ICD-10-PCS | Mod: CPTII,S$GLB,, | Performed by: PHYSICIAN ASSISTANT

## 2022-10-27 PROCEDURE — 4010F PR ACE/ARB THEARPY RXD/TAKEN: ICD-10-PCS | Mod: CPTII,S$GLB,, | Performed by: PHYSICIAN ASSISTANT

## 2022-10-27 PROCEDURE — 99214 PR OFFICE/OUTPT VISIT, EST, LEVL IV, 30-39 MIN: ICD-10-PCS | Mod: S$GLB,,, | Performed by: PHYSICIAN ASSISTANT

## 2022-10-27 PROCEDURE — 4010F ACE/ARB THERAPY RXD/TAKEN: CPT | Mod: CPTII,S$GLB,, | Performed by: PHYSICIAN ASSISTANT

## 2022-10-27 PROCEDURE — 1160F RVW MEDS BY RX/DR IN RCRD: CPT | Mod: CPTII,S$GLB,, | Performed by: PHYSICIAN ASSISTANT

## 2022-10-27 PROCEDURE — 3074F SYST BP LT 130 MM HG: CPT | Mod: CPTII,S$GLB,, | Performed by: PHYSICIAN ASSISTANT

## 2022-10-27 PROCEDURE — 3062F PR POS MACROALBUMINURIA RESULT DOCUMENTED/REVIEW: ICD-10-PCS | Mod: CPTII,S$GLB,, | Performed by: PHYSICIAN ASSISTANT

## 2022-10-27 PROCEDURE — 3062F POS MACROALBUMINURIA REV: CPT | Mod: CPTII,S$GLB,, | Performed by: PHYSICIAN ASSISTANT

## 2022-10-27 PROCEDURE — 1160F PR REVIEW ALL MEDS BY PRESCRIBER/CLIN PHARMACIST DOCUMENTED: ICD-10-PCS | Mod: CPTII,S$GLB,, | Performed by: PHYSICIAN ASSISTANT

## 2022-10-27 PROCEDURE — 99999 PR PBB SHADOW E&M-EST. PATIENT-LVL V: CPT | Mod: PBBFAC,,, | Performed by: PHYSICIAN ASSISTANT

## 2022-10-27 PROCEDURE — 1159F PR MEDICATION LIST DOCUMENTED IN MEDICAL RECORD: ICD-10-PCS | Mod: CPTII,S$GLB,, | Performed by: PHYSICIAN ASSISTANT

## 2022-10-27 PROCEDURE — 3044F HG A1C LEVEL LT 7.0%: CPT | Mod: CPTII,S$GLB,, | Performed by: PHYSICIAN ASSISTANT

## 2022-10-27 PROCEDURE — 3078F PR MOST RECENT DIASTOLIC BLOOD PRESSURE < 80 MM HG: ICD-10-PCS | Mod: CPTII,S$GLB,, | Performed by: PHYSICIAN ASSISTANT

## 2022-10-27 PROCEDURE — 3044F PR MOST RECENT HEMOGLOBIN A1C LEVEL <7.0%: ICD-10-PCS | Mod: CPTII,S$GLB,, | Performed by: PHYSICIAN ASSISTANT

## 2022-10-27 PROCEDURE — 99999 PR PBB SHADOW E&M-EST. PATIENT-LVL V: ICD-10-PCS | Mod: PBBFAC,,, | Performed by: PHYSICIAN ASSISTANT

## 2022-10-27 PROCEDURE — 1159F MED LIST DOCD IN RCRD: CPT | Mod: CPTII,S$GLB,, | Performed by: PHYSICIAN ASSISTANT

## 2022-10-27 PROCEDURE — 99214 OFFICE O/P EST MOD 30 MIN: CPT | Mod: S$GLB,,, | Performed by: PHYSICIAN ASSISTANT

## 2022-10-27 PROCEDURE — 3066F NEPHROPATHY DOC TX: CPT | Mod: CPTII,S$GLB,, | Performed by: PHYSICIAN ASSISTANT

## 2022-10-27 PROCEDURE — 99499 UNLISTED E&M SERVICE: CPT | Mod: S$GLB,,, | Performed by: PHYSICIAN ASSISTANT

## 2022-10-27 PROCEDURE — 3078F DIAST BP <80 MM HG: CPT | Mod: CPTII,S$GLB,, | Performed by: PHYSICIAN ASSISTANT

## 2022-10-27 NOTE — PROGRESS NOTES
Subjective:      Patient ID: Crow Green is a 67 y.o. male.    Chief Complaint: Follow-up    HPI  Here today because he needs a new pain management doctor. Tested positive for marijuana and was dismissed from his current pain management doctor at [facility].    Up to date with diabetes, podiatry, and ophthalmology.    BP stable and controlled on current meds.     Patient Active Problem List   Diagnosis    ED (erectile dysfunction)    Non-proliferative diabetic retinopathy, mild, both eyes    Essential hypertension    Diabetic polyneuropathy    Nonobstructive coronary artery disease of native artery of native heart    Chronic combined systolic and diastolic heart failure    Type 2 diabetes mellitus    SANDRITA (obstructive sleep apnea)    H/O Asthma    Psychophysiological insomnia    Nightmares    Sleep related gastroesophageal reflux disease    Inadequate sleep hygiene    Chest pain    PAF (paroxysmal atrial fibrillation)    At risk for medication noncompliance    Obesity (BMI 30.0-34.9)    Indeterminate stage chronic open angle glaucoma    Right hip pain    Gait instability    Chronic right shoulder pain    Arthritis    Left sided sciatica    Dyspnea on exertion    Osteoarthritis of spine with radiculopathy, lumbar region    HNP (herniated nucleus pulposus), lumbar    Gastroesophageal reflux disease without esophagitis    Chronic diastolic heart failure    Hypogonadism in male    Disorder of parathyroid gland    Hyperlipidemia associated with type 2 diabetes mellitus    JOSE MARIA (acute kidney injury)    Traumatic tear of left rotator cuff    Other chronic pain    Left shoulder pain    Complete tear of left rotator cuff    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 associated with type 2 diabetes mellitus    Lumbar radiculopathy    Diabetic ulcer of toe of left foot associated with type 2 diabetes mellitus, with necrosis of muscle    Ulcer of left foot    Elevated troponin     Hypotension due to medication    Dermatomyositis    Postprocedural hypotension    Advanced directives, counseling/discussion    Schizoaffective disorder, depressive type    Chronic obstructive pulmonary disease    MDD (major depressive disorder), recurrent episode, moderate    DOMINIK (generalized anxiety disorder)    Bilateral carpal tunnel syndrome    Rotator cuff tear arthropathy of left shoulder    Sacroiliac dysfunction    Atherosclerosis of native coronary artery of native heart with unstable angina pectoris    Coronary vasospasm    Cocaine abuse    Osteopenia    Atherosclerosis of aorta    Cardiac asthma    Chronic gout of multiple sites    Acquired absence of left great toe    Acute renal failure superimposed on stage 3 chronic kidney disease    Pneumonia    Acute on chronic respiratory failure with hypoxia    Hypoxia    Cardiomyopathy    Bilateral carotid artery stenosis    Substance abuse(UDS + for cocaine )         Current Outpatient Medications:     allopurinoL (ZYLOPRIM) 100 MG tablet, Take 1 tablet (100 mg total) by mouth once daily., Disp: 90 tablet, Rfl: 1    aspirin (ECOTRIN) 81 MG EC tablet, Take 1 tablet (81 mg total) by mouth once daily., Disp: , Rfl:     atorvastatin (LIPITOR) 40 MG tablet, TAKE 1 TABLET BY MOUTH EVERY EVENING, Disp: 90 tablet, Rfl: 3    azaTHIOprine (IMURAN) 50 mg Tab, Take 1 tablet (50 mg total) by mouth once daily., Disp: 90 tablet, Rfl: 0    baclofen (LIORESAL) 10 MG tablet, Take 1 tablet (10 mg total) by mouth once daily., Disp: , Rfl: 0    blood sugar diagnostic Strp, 1 each by Misc.(Non-Drug; Combo Route) route 4 (four) times daily., Disp: 300 each, Rfl: 3    blood-glucose meter (TRUE METRIX GLUCOSE METER) kit, Use as instructed to test blood glucose meter daily., Disp: 1 each, Rfl: 1    ELIQUIS 5 mg Tab, Take 1 tablet (5 mg total) by mouth 2 (two) times daily., Disp: 180 tablet, Rfl: 1    furosemide (LASIX) 40 MG tablet, Take 2 tablets (80 mg total) by mouth 2 (two) times a  "day., Disp: 60 tablet, Rfl: 3    gabapentin (NEURONTIN) 800 MG tablet, Take 1 tablet (800 mg total) by mouth 3 (three) times daily., Disp: 270 tablet, Rfl: 4    insulin aspart U-100 (NOVOLOG FLEXPEN U-100 INSULIN) 100 unit/mL (3 mL) InPn pen, INJECT 10 UNITS SUBCUTANEOUSLY THREE TIMES DAILY WITH MEALS, Disp: 15 mL, Rfl: 0    ipratropium (ATROVENT) 0.02 % nebulizer solution, INHALE THE CONTENTS OF 1 VIAL VIA NEBULIZER FOUR TIMES DAILY, Disp: 1 Box, Rfl: 0    JARDIANCE 25 mg tablet, TAKE 1 TABLET(25 MG) BY MOUTH EVERY DAY, Disp: 90 tablet, Rfl: 1    MICRO THIN LANCETS 33 gauge Misc, , Disp: , Rfl:     pantoprazole (PROTONIX) 40 MG tablet, Take 1 tablet (40 mg total) by mouth once daily., Disp: 90 tablet, Rfl: 1    pen needle, diabetic (BD ULTRA-FINE SHORT PEN NEEDLE) 31 gauge x 5/16" Ndle, 1 each by Misc.(Non-Drug; Combo Route) route 3 (three) times daily., Disp: 300 each, Rfl: 3    ranolazine (RANEXA) 1,000 mg Tb12, Take 1 tablet (1,000 mg total) by mouth 2 (two) times daily., Disp: 60 tablet, Rfl: 11    sacubitriL-valsartan (ENTRESTO) 24-26 mg per tablet, Take 1 tablet by mouth 2 (two) times daily., Disp: 120 tablet, Rfl: 1    semaglutide (OZEMPIC) 2 mg/dose (8 mg/3 mL) PnIj, Inject 2 mg into the skin every 7 days., Disp: 9 mL, Rfl: 3    sertraline (ZOLOFT) 100 MG tablet, Take 1 tablet (100 mg total) by mouth every evening., Disp: 90 tablet, Rfl: 1    tamsulosin (FLOMAX) 0.4 mg Cap, Take 1 capsule (0.4 mg total) by mouth once daily., Disp: 90 capsule, Rfl: 1    tiotropium (SPIRIVA WITH HANDIHALER) 18 mcg inhalation capsule, INHALE THE CONTENTS OF 1 CAPSULE ONE TIME DAILY (CONTROLLER), Disp: 90 capsule, Rfl: 3    VIOS AEROSOL DELIVERY SYSTEM Shefali, USE AS DIRESTED, Disp: , Rfl: 0    Review of Systems   Constitutional:  Negative for activity change, appetite change, chills, diaphoresis, fatigue, fever and unexpected weight change.   HENT: Negative.  Negative for congestion, hearing loss, postnasal drip, rhinorrhea, " "sore throat, trouble swallowing and voice change.    Eyes: Negative.  Negative for visual disturbance.   Respiratory: Negative.  Negative for cough, choking, chest tightness and shortness of breath.    Cardiovascular:  Negative for chest pain, palpitations and leg swelling.   Gastrointestinal:  Negative for abdominal distention, abdominal pain, blood in stool, constipation, diarrhea, nausea and vomiting.   Endocrine: Negative for cold intolerance, heat intolerance, polydipsia and polyuria.   Genitourinary: Negative.  Negative for difficulty urinating and frequency.   Musculoskeletal:  Positive for arthralgias and back pain. Negative for gait problem, joint swelling and myalgias.   Skin:  Negative for color change, pallor, rash and wound.   Neurological:  Negative for dizziness, tremors, weakness, light-headedness, numbness and headaches.   Hematological:  Negative for adenopathy.   Psychiatric/Behavioral:  Negative for behavioral problems, confusion, self-injury, sleep disturbance and suicidal ideas. The patient is not nervous/anxious.    Objective:   /72 (BP Location: Right arm, Patient Position: Sitting, BP Method: Large (Manual))   Pulse 89   Temp 96.1 °F (35.6 °C) (Tympanic)   Ht 5' 5" (1.651 m)   Wt 85 kg (187 lb 6.3 oz)   SpO2 96%   BMI 31.18 kg/m²     Physical Exam  Vitals and nursing note reviewed.   Constitutional:       General: He is not in acute distress.     Appearance: Normal appearance. He is well-developed. He is not ill-appearing, toxic-appearing or diaphoretic.   HENT:      Head: Normocephalic and atraumatic.   Cardiovascular:      Rate and Rhythm: Normal rate and regular rhythm.      Heart sounds: Normal heart sounds. No murmur heard.    No friction rub. No gallop.   Pulmonary:      Effort: Pulmonary effort is normal. No respiratory distress.      Breath sounds: Normal breath sounds. No wheezing or rales.   Skin:     General: Skin is warm.      Findings: No rash.   Neurological:      " Mental Status: He is alert and oriented to person, place, and time.   Psychiatric:         Mood and Affect: Mood normal.         Behavior: Behavior normal.         Thought Content: Thought content normal.         Judgment: Judgment normal.     Lab Results   Component Value Date    HGBA1C 6.0 (H) 07/31/2022     Lab Results   Component Value Date    CHOL 152 06/10/2022    CHOL 116 (L) 05/25/2021    CHOL 133 05/24/2021     Lab Results   Component Value Date    HDL 43 06/10/2022    HDL 31 (L) 05/25/2021    HDL 36 (L) 05/24/2021     Lab Results   Component Value Date    LDLCALC 88.8 06/10/2022    LDLCALC 62.6 (L) 05/25/2021    LDLCALC 77.0 05/24/2021     Lab Results   Component Value Date    TRIG 101 06/10/2022    TRIG 112 05/25/2021    TRIG 100 05/24/2021     Lab Results   Component Value Date    CHOLHDL 28.3 06/10/2022    CHOLHDL 26.7 05/25/2021    CHOLHDL 27.1 05/24/2021      Assessment:     1. Left sided sciatica    2. Lumbar radiculopathy    3. Chronic pain of both shoulders    4. Other chronic pain    5. Screening for prostate cancer    6. Benign prostatic hyperplasia with weak urinary stream    7. Essential hypertension    8. Type 2 diabetes mellitus with mild nonproliferative retinopathy and macular edema, with long-term current use of insulin, unspecified laterality    9. Mild nonproliferative diabetic retinopathy of both eyes without macular edema associated with type 2 diabetes mellitus    10. Acquired absence of left great toe    11. Chronic combined systolic and diastolic heart failure    12. Atherosclerosis of aorta    13. Atherosclerosis of native coronary artery of native heart with unstable angina pectoris      Plan:   Left sided sciatica    Lumbar radiculopathy  -     Ambulatory referral/consult to Pain Clinic; Future; Expected date: 11/03/2022    Chronic pain of both shoulders  -     Ambulatory referral/consult to Pain Clinic; Future; Expected date: 11/03/2022    Other chronic pain  -     Ambulatory  referral/consult to Pain Clinic; Future; Expected date: 11/03/2022    Screening for prostate cancer  -     PSA, Screening; Future; Expected date: 10/27/2022    Benign prostatic hyperplasia with weak urinary stream  -     Ambulatory referral/consult to Urology; Future; Expected date: 11/03/2022    Essential hypertension    Type 2 diabetes mellitus with mild nonproliferative retinopathy and macular edema, with long-term current use of insulin, unspecified laterality    Mild nonproliferative diabetic retinopathy of both eyes without macular edema associated with type 2 diabetes mellitus    Acquired absence of left great toe    Chronic combined systolic and diastolic heart failure    Atherosclerosis of aorta    Atherosclerosis of native coronary artery of native heart with unstable angina pectoris      Follow up in 6 months (on 4/27/2023), or if symptoms worsen or fail to improve.

## 2022-10-27 NOTE — TELEPHONE ENCOUNTER
----- Message from Skylar Whitt sent at 10/27/2022 10:32 AM CDT -----  Please call pt @ [phone], pt is at clinic now, need to speak with bethany, pt would like to come see you.

## 2022-10-28 ENCOUNTER — TELEPHONE (OUTPATIENT)
Dept: DIABETES | Facility: CLINIC | Age: 67
End: 2022-10-28
Payer: MEDICARE

## 2022-10-28 NOTE — TELEPHONE ENCOUNTER
Returned pt call. Pt states that he would like to change his appointments that he had on 10/31/2022 because he has no transportation. Appointment rescheduled for 11/2/2022. Pt verbalized understanding.

## 2022-10-28 NOTE — TELEPHONE ENCOUNTER
----- Message from Yue Abbott sent at 10/28/2022  9:58 AM CDT -----  Contact: [phone]  Pt is calling in regards to rescheduling his nurse visit  appt. Please call pt back at [phone]. Thanks KB

## 2022-10-31 ENCOUNTER — TELEPHONE (OUTPATIENT)
Dept: DIABETES | Facility: CLINIC | Age: 67
End: 2022-10-31
Payer: MEDICARE

## 2022-10-31 NOTE — TELEPHONE ENCOUNTER
----- Message from Dayron Rendon sent at 10/31/2022  9:58 AM CDT -----  Contact: [phone]  Pt is calling regarding nurse visit . Please call back at [phone]. Thanks.dj

## 2022-11-01 ENCOUNTER — TELEPHONE (OUTPATIENT)
Dept: DIABETES | Facility: CLINIC | Age: 67
End: 2022-11-01
Payer: MEDICARE

## 2022-11-01 NOTE — TELEPHONE ENCOUNTER
Returned pt call. Pt states that he is out of pen needles for his flex pen. Advised pt that refills have been sent to pharmacy. Pt also had questions about his up coming appointments. Advised pt that he has an appointment scheduled for 11/2/2022 for nurse visit. Pt stated that he forgot about this appointment and has cancelled his ride. Appointment was rescheduled. Pt verbalized understanding.

## 2022-11-01 NOTE — TELEPHONE ENCOUNTER
----- Message from Lucy Dailey RD, CDE sent at 11/1/2022 10:55 AM CDT -----  Contact: pt    ----- Message -----  From: Dang Elias  Sent: 11/1/2022  10:49 AM CDT  To: , #    Type:  Patient Returning Call    Who Called:pt  Who Left Message for Patient:nurse  Does the patient know what this is regarding?:appt reschedule  Would the patient rather a call back or a response via [facility]? phone  Best Call Back Number:[phone] or [phone]  Additional Information:     Type:  RX Refill Request    Who Called: pt  Refill or New Rx:refill  RX Name and Strength:   needles for TRUE METRIX GLUCOSE METER)  How is the patient currently taking it? (ex. 1XDay): n/a  Is this a 30 day or 90 day RX: 90 day  Preferred Pharmacy with phone number: [phone]  Local or Mail Order:local  Ordering Provider: Ms. Pitts  Would the patient rather a call back or a response via [facility]? phone  Best Call Back Number:[phone]  Additional Information:         [id number] - [place] - [address]  [address] [place] [address]  Phone: [phone] Fax: [phone]

## 2022-11-07 ENCOUNTER — LAB VISIT (OUTPATIENT)
Dept: LAB | Facility: HOSPITAL | Age: 67
End: 2022-11-07
Attending: PHYSICIAN ASSISTANT
Payer: MEDICARE

## 2022-11-07 DIAGNOSIS — Z12.5 SCREENING FOR PROSTATE CANCER: ICD-10-CM

## 2022-11-07 PROCEDURE — 84153 ASSAY OF PSA TOTAL: CPT | Performed by: PHYSICIAN ASSISTANT

## 2022-11-07 PROCEDURE — 36415 COLL VENOUS BLD VENIPUNCTURE: CPT | Performed by: PHYSICIAN ASSISTANT

## 2022-11-07 NOTE — SUBJECTIVE & OBJECTIVE
Past Medical History:   Diagnosis Date    Acute kidney injury     Arthritis     Asthma     Atrial fibrillation     CHF (congestive heart failure)     Depression     Diabetes mellitus, type 2 Diagnosed in 2000    Elevated PSA     Hypertension     Sleep apnea        Past Surgical History:   Procedure Laterality Date    BIOPSY-ARTERY-TEMPORAL Left 9/1/2017    Performed by Torin Bishop MD at [facility] OR    CHOLECYSTECTOMY      CHOLECYSTECTOMY-LAPAROSCOPIC N/A 3/17/2017    Performed by Louis O. Jeansonne IV, MD at [facility] OR       Review of patient's allergies indicates:  No Known Allergies    No current facility-administered medications on file prior to encounter.      Current Outpatient Medications on File Prior to Encounter   Medication Sig    aspirin (ECOTRIN) 81 MG EC tablet Take 1 tablet (81 mg total) by mouth once daily.    ALCOHOL ANTISEPTIC PADS (ALCOHOL PREP PADS TOP)     amLODIPine (NORVASC) 10 MG tablet TAKE 1 TABLET ONE TIME DAILY    atorvastatin (LIPITOR) 40 MG tablet Take 1 tablet (40 mg total) by mouth every evening.    azaTHIOprine (IMURAN) 50 mg Tab Take 1 tablet (50 mg total) by mouth once daily.    baclofen (LIORESAL) 10 MG tablet Take 1 tablet (10 mg total) by mouth once daily.    blood-glucose meter (TRUE METRIX AIR GLUCOSE METER) kit TEST FOUR TIMES DAILY BEFORE MEALS  AND EVERY NIGHT    ELIQUIS 5 mg Tab     fluticasone-salmeterol 250-50 mcg/dose (ADVAIR DISKUS) 250-50 mcg/dose diskus inhaler Inhale 1 puff into the lungs 2 (two) times daily. Controller    furosemide (LASIX) 40 MG tablet Take 2 tablets (80 mg total) by mouth every morning AND 1 tablet (40 mg total) every evening.    gabapentin (NEURONTIN) 800 MG tablet Take 1 tablet (800 mg total) by mouth 3 (three) times daily.    glimepiride (AMARYL) 2 MG tablet Take 1 tablet (2 mg total) by mouth before breakfast.    inhalation spacing device Use as directed for inhalation.    insulin (LANTUS SOLOSTAR U-100 INSULIN) glargine  "100 units/mL (3mL) SubQ pen Inject 80 Units into the skin every evening.    insulin aspart U-100 (NOVOLOG FLEXPEN U-100 INSULIN) 100 unit/mL (3 mL) InPn pen INJECT 20 UNITS SUBCUTANEOUSLY THREE TIMES DAILY WITH MEALS    insulin syringe-needle U-100 1 mL 31 gauge x 5/16 Syrg     ipratropium-albuterol (COMBIVENT)  mcg/actuation inhaler Inhale 1 puff into the lungs 4 (four) times daily. Rescue    isosorbide mononitrate (IMDUR) 60 MG 24 hr tablet Take 1 tablet (60 mg total) by mouth once daily.    lancets 32 gauge Misc 1 lancet by Misc.(Non-Drug; Combo Route) route 2 (two) times daily.    latanoprost 0.005 % ophthalmic solution Place 1 drop into both eyes every evening.    lisinopril 10 MG tablet Take 1 tablet (10 mg total) by mouth once daily.    metFORMIN (GLUCOPHAGE) 1000 MG tablet Take 1 tablet (1,000 mg total) by mouth 2 (two) times daily with meals.    metOLazone (ZAROXOLYN) 2.5 MG tablet Take 1 tablet (2.5 mg total) by mouth once daily.    metoprolol succinate (TOPROL-XL) 50 MG 24 hr tablet TAKE 1 TABLET EVERY DAY    nitroGLYCERIN (NITROSTAT) 0.3 MG SL tablet PLACE 1 TABLET UNDER THE TONGUE EVERY 5 MINUTES AS NEEDED FOR CHEST PAIN, NO MORE THAN 3 TABLETS IN ONE DAY    pantoprazole (PROTONIX) 40 MG tablet Take 1 tablet (40 mg total) by mouth once daily.    pen needle, diabetic (BD ULTRA-FINE SHORT PEN NEEDLE) 31 gauge x 5/16" Ndle Inject 1 each into the skin 3 (three) times daily.    predniSONE (DELTASONE) 20 MG tablet Take 1 tablet (20 mg total) by mouth once daily.    semaglutide (OZEMPIC) 0.25 mg or 0.5 mg(2 mg/1.5 mL) PnIj Inject 0.25 mg into the skin every 7 days.    sertraline (ZOLOFT) 50 MG tablet TAKE 1 TABLET ONE TIME DAILY    tiotropium (SPIRIVA WITH HANDIHALER) 18 mcg inhalation capsule Inhale 1 capsule (18 mcg total) into the lungs once daily. Controller    traZODone (DESYREL) 150 MG tablet TAKE 1 TABLET EVERY NIGHT    VENTOLIN HFA 90 mcg/actuation inhaler Inhale 2 puffs into " the lungs every 4 (four) hours as needed for Wheezing.     Family History     Problem Relation (Age of Onset)    Cataracts Mother, Father, Sister    Glaucoma Mother, Sister, Maternal Aunt    No Known Problems Brother, Daughter, Son        Tobacco Use    Smoking status: Never Smoker    Smokeless tobacco: Never Used   Substance and Sexual Activity    Alcohol use: No    Drug use: No    Sexual activity: Not Currently     Review of Systems   Constitutional: Positive for activity change and diaphoresis. Negative for appetite change, fatigue and fever.   HENT: Negative for congestion, postnasal drip, sinus pressure, sore throat and trouble swallowing.    Eyes: Negative.    Respiratory: Negative for cough, shortness of breath and wheezing.    Cardiovascular: Positive for chest pain and palpitations.   Gastrointestinal: Positive for nausea. Negative for abdominal distention, abdominal pain, diarrhea and vomiting.   Endocrine: Negative for cold intolerance and heat intolerance.   Genitourinary: Negative for difficulty urinating, dysuria, flank pain, frequency and urgency.   Musculoskeletal: Negative for arthralgias, back pain and myalgias.   Skin: Negative for color change and wound.   Allergic/Immunologic: Negative for environmental allergies and food allergies.   Neurological: Positive for dizziness and headaches. Negative for syncope and weakness.   Hematological: Does not bruise/bleed easily.   Psychiatric/Behavioral: Negative for agitation, confusion and sleep disturbance. The patient is not nervous/anxious.      Objective:     Vital Signs (Most Recent):  Temp: 97.3 °F (36.3 °C) (08/07/19 1953)  Pulse: 72 (08/07/19 1953)  Resp: 16 (08/07/19 1953)  BP: 103/63 (08/07/19 1953)  SpO2: 96 % (08/07/19 1953) Vital Signs (24h Range):  Temp:  [96.9 °F (36.1 °C)-98.1 °F (36.7 °C)] 97.3 °F (36.3 °C)  Pulse:  [] 72  Resp:  [16-23] 16  SpO2:  [94 %-98 %] 96 %  BP: ()/(50-80) 103/63     Weight: 87.2 kg (192 lb 3.9  oz)  Body mass index is 31.99 kg/m².    Physical Exam   Constitutional: He is oriented to person, place, and time. He appears well-developed and well-nourished.   HENT:   Head: Normocephalic.   Nose: Nose normal.   Mouth/Throat: Oropharynx is clear and moist.   Eyes: Conjunctivae are normal.   Neck: Normal range of motion. Neck supple.   Cardiovascular: Normal rate and intact distal pulses. An irregularly irregular rhythm present.   Pulses:       Dorsalis pedis pulses are 2+ on the right side, and 2+ on the left side.        Posterior tibial pulses are 2+ on the right side, and 2+ on the left side.   Pulmonary/Chest: Effort normal and breath sounds normal. No respiratory distress. He has no wheezes.   Pain to chest reproducible with palpation    Abdominal: Soft. Bowel sounds are normal. He exhibits no distension. There is no tenderness.   Genitourinary:   Genitourinary Comments: Deferred    Musculoskeletal: Normal range of motion.   Neurological: He is alert and oriented to person, place, and time.   Skin: Skin is warm and dry.   Psychiatric: He has a normal mood and affect. His behavior is normal.           Significant Labs:   CBC:   Recent Labs   Lab 08/07/19  1125 08/07/19  1408   WBC 12.23 16.06*   HGB 13.2* 13.2*   HCT 41.6 41.4    194     CMP:   Recent Labs   Lab 08/07/19  1408      K 3.7      CO2 27   GLU 59*   BUN 37*   CREATININE 1.5*   CALCIUM 9.5   PROT 7.1   ALBUMIN 3.5   BILITOT 0.5   ALKPHOS 61   AST 17   ALT 25   ANIONGAP 10   EGFRNONAA 49*     Magnesium:   Recent Labs   Lab 08/07/19  1408   MG 1.7     Troponin:   Recent Labs   Lab 08/07/19  1408 08/07/19  1645   TROPONINI 0.078* 0.081*     TSH:   Recent Labs   Lab 08/07/19  1645   TSH 1.781       Significant Imaging:   Imaging Results          X-Ray Chest AP Portable (Final result)  Result time 08/07/19 14:14:04    Final result by Jayjay De La Rosa MD (08/07/19 14:14:04)                 Impression:      No acute  abnormality.      Electronically signed by: Jayjay De La Rosa  Date:    08/07/2019  Time:    14:14             Narrative:    EXAMINATION:  XR CHEST AP PORTABLE    CLINICAL HISTORY:  Chest Pain;.    TECHNIQUE:  Single frontal portable view of the chest was performed.    COMPARISON:  07/24/2019    FINDINGS:  Support devices: None    The lungs are clear, with normal appearance of pulmonary vasculature and no pleural effusion or pneumothorax.    The cardiac silhouette is normal in size. The hilar and mediastinal contours are unremarkable.    Bones are intact.                               No

## 2022-11-08 ENCOUNTER — OUTPATIENT CASE MANAGEMENT (OUTPATIENT)
Dept: ADMINISTRATIVE | Facility: OTHER | Age: 67
End: 2022-11-08
Payer: MEDICARE

## 2022-11-08 LAB — COMPLEXED PSA SERPL-MCNC: 2.8 NG/ML (ref 0–4)

## 2022-11-08 NOTE — PROGRESS NOTES
1st Attempt to complete SW follow-up for Outpatient Care Management; left message requesting return call.  SW will reattempt at a later date.

## 2022-11-08 NOTE — LETTER
November 14, 2022    Crow FISCHER Norma  [address]  [facility] [address]             [facility] [address] [place] [address] Dear Mr. Green:     I am writing from the Outpatient Complex Care Management Department at [facility].  I have been unsuccessful at reaching you to follow up with you to see how you have been doing. I hope you find the resources previously provided to you helpful. Please contact me for further assistance.    I can be reached at [phone] Monday thru Friday from 8:00am to 4:30pm.  [facility] also has a program with a nurse available 24/7 to answer questions or provide medical advice.  [facility] can be reached at [phone].    Sincerely,    Briana Tripp LMSW

## 2022-11-21 NOTE — PROGRESS NOTES
SW mailed letter after 2nd attempt for SW follow-up with contact information requesting a return call and pt has not reached out to this SW.  DARWIN will close case and notified OPCM RN.

## 2022-11-22 ENCOUNTER — OFFICE VISIT (OUTPATIENT)
Dept: DIABETES | Facility: CLINIC | Age: 67
End: 2022-11-22
Payer: MEDICARE

## 2022-11-22 ENCOUNTER — PES CALL (OUTPATIENT)
Dept: ADMINISTRATIVE | Facility: CLINIC | Age: 67
End: 2022-11-22
Payer: MEDICARE

## 2022-11-22 DIAGNOSIS — E11.59 HYPERTENSION ASSOCIATED WITH DIABETES: ICD-10-CM

## 2022-11-22 DIAGNOSIS — E11.3312 MODERATE NONPROLIFERATIVE DIABETIC RETINOPATHY OF LEFT EYE WITH MACULAR EDEMA ASSOCIATED WITH TYPE 2 DIABETES MELLITUS: ICD-10-CM

## 2022-11-22 DIAGNOSIS — I15.2 HYPERTENSION ASSOCIATED WITH DIABETES: ICD-10-CM

## 2022-11-22 DIAGNOSIS — E66.9 OBESITY (BMI 30.0-34.9): ICD-10-CM

## 2022-11-22 DIAGNOSIS — E21.5 DISORDER OF PARATHYROID GLAND: ICD-10-CM

## 2022-11-22 DIAGNOSIS — E11.65 UNCONTROLLED TYPE 2 DIABETES MELLITUS WITH HYPERGLYCEMIA: Primary | ICD-10-CM

## 2022-11-22 DIAGNOSIS — E11.42 DIABETIC POLYNEUROPATHY ASSOCIATED WITH TYPE 2 DIABETES MELLITUS: ICD-10-CM

## 2022-11-22 DIAGNOSIS — E78.5 HYPERLIPIDEMIA ASSOCIATED WITH TYPE 2 DIABETES MELLITUS: ICD-10-CM

## 2022-11-22 DIAGNOSIS — I50.32 CHRONIC DIASTOLIC HEART FAILURE: ICD-10-CM

## 2022-11-22 DIAGNOSIS — E11.69 HYPERLIPIDEMIA ASSOCIATED WITH TYPE 2 DIABETES MELLITUS: ICD-10-CM

## 2022-11-22 DIAGNOSIS — I25.118 CORONARY ARTERY DISEASE OF NATIVE ARTERY OF NATIVE HEART WITH STABLE ANGINA PECTORIS: ICD-10-CM

## 2022-11-22 PROCEDURE — 3062F POS MACROALBUMINURIA REV: CPT | Mod: CPTII,95,, | Performed by: PHYSICIAN ASSISTANT

## 2022-11-22 PROCEDURE — 4010F PR ACE/ARB THEARPY RXD/TAKEN: ICD-10-PCS | Mod: CPTII,95,, | Performed by: PHYSICIAN ASSISTANT

## 2022-11-22 PROCEDURE — 3044F HG A1C LEVEL LT 7.0%: CPT | Mod: CPTII,95,, | Performed by: PHYSICIAN ASSISTANT

## 2022-11-22 PROCEDURE — 3066F PR DOCUMENTATION OF TREATMENT FOR NEPHROPATHY: ICD-10-PCS | Mod: CPTII,95,, | Performed by: PHYSICIAN ASSISTANT

## 2022-11-22 PROCEDURE — 99441 PR PHYSICIAN TELEPHONE EVALUATION 5-10 MIN: CPT | Mod: 95,,, | Performed by: PHYSICIAN ASSISTANT

## 2022-11-22 PROCEDURE — 4010F ACE/ARB THERAPY RXD/TAKEN: CPT | Mod: CPTII,95,, | Performed by: PHYSICIAN ASSISTANT

## 2022-11-22 PROCEDURE — 99441 PR PHYSICIAN TELEPHONE EVALUATION 5-10 MIN: ICD-10-PCS | Mod: 95,,, | Performed by: PHYSICIAN ASSISTANT

## 2022-11-22 PROCEDURE — 3062F PR POS MACROALBUMINURIA RESULT DOCUMENTED/REVIEW: ICD-10-PCS | Mod: CPTII,95,, | Performed by: PHYSICIAN ASSISTANT

## 2022-11-22 PROCEDURE — 3066F NEPHROPATHY DOC TX: CPT | Mod: CPTII,95,, | Performed by: PHYSICIAN ASSISTANT

## 2022-11-22 PROCEDURE — 3044F PR MOST RECENT HEMOGLOBIN A1C LEVEL <7.0%: ICD-10-PCS | Mod: CPTII,95,, | Performed by: PHYSICIAN ASSISTANT

## 2022-11-22 NOTE — PROGRESS NOTES
PCP: Cm Polanco MD    Subjective:     Chief Complaint: Diabetes - Established Patient    Established Patient - Audio Only Telehealth Visit     The patient location is: Home  The chief complaint leading to consultation is: Diabetes follow up  Visit type: Virtual visit with audio only (telephone)  Total Time Spent with Patient: 8 minutes     The reason for the audio only service rather than synchronous audio and video virtual visit was related to technical difficulties or patient preference/necessity.     Each patient to whom I provide medical services by telemedicine is:  (1) informed of the relationship between the physician and patient and the respective role of any other health care provider with respect to management of the patient; and (2) notified that they may decline to receive medical services by telemedicine and may withdraw from such care at any time. Patient verbally consented to receive this service via voice-only telephone call.    This service was not originating from a related E/M service provided within the previous 7 days nor will  to an E/M service or procedure within the next 24 hours or my soonest available appointment.  Prevailing standard of care was able to be met in this audio-only visit.       HISTORY OF PRESENT ILLNESS: 67 y.o.   male presenting for diabetes management visit.   The patient's last visit with me was on 1/5/2022.  Patient has issues with transportation.   Patient has had Type II diabetes since 2000.  Pertinent to decision making is the following comorbidities: HTN, HLD, CAD, CHF, CKD III, Obesity by BMI, ED and Parathryoid disorder and Hypogonadism   Patient has the following Diabetes complications: with diabetic chronic kidney disease, with diabetic polyneuropathy and with diabetic retinopathy; mild non-proliferative; without macular edema  He has attended diabetes education in the past.     Patient's most recent A1c of 6.0% was completed 4  months ago.   Patient states since His last A1c His blood glucose levels have been within range throughout the day .   Patient monitors blood glucose 4 times per day and Continuously with personal CGM Dexcom.   Patient blood glucose monitoring device will not be uploaded into [employer] [facility] today.  Patient reports fasting blood sugars ranging from 65 - 120 and before meals 100 - 120 and after meals less than 120.   Patient endorses the following diabetes related symptoms:  None .   Patient is due today for the following diabetes-related health maintenance standards: COVID-19 Vaccine .   He denies recent hospital admissions or emergency room visits.   He voices having hypoglycemia in the am when skipping meal.   Patient's concerns today include glycemic control.   Patient medication regimen is as below.     CURRENT DM MEDICATIONS:   Tresiba - not taking  Novolog correction dosing every 3 hours   Jardiance 25 mg daily  Ozempic 2 mg weekly    Novolog Correction Dosing every 3 hours as needed OUTSIDE OF EATING  If  - 250, may take 2 units of Novolog  If  - 300, may take 3 units of Novolog  If  - 350, may take 4 units of Novolog  If  - 400, may take 5 units of Novolog  If +, may take 6 units of Novolog      Patient has failed the following Diabetes medications:   Bydureon  Metformin - GI  Glimepiride      Labs Reviewed.       Lab Results   Component Value Date    CPEPTIDE 4.37 07/20/2017     Lab Results   Component Value Date    GLUTAMICACID 0.01 07/20/2017          //   , There is no height or weight on file to calculate BMI.  His blood sugar in clinic today is:        Review of Systems   Constitutional:  Negative for activity change, appetite change, chills and fever.   HENT:  Negative for dental problem, mouth sores, nosebleeds, sore throat and trouble swallowing.    Eyes:  Negative for pain and discharge.   Respiratory:  Negative for shortness of breath, wheezing and stridor.     Cardiovascular:  Negative for chest pain, palpitations and leg swelling.   Gastrointestinal:  Negative for abdominal pain, diarrhea, nausea and vomiting.   Endocrine: Negative for polydipsia, polyphagia and polyuria.   Genitourinary:  Negative for dysuria, frequency and urgency.   Musculoskeletal:  Negative for joint swelling and myalgias.   Skin:  Negative for rash and wound.   Neurological:  Negative for dizziness, syncope, weakness and headaches.   Psychiatric/Behavioral:  Negative for behavioral problems and dysphoric mood.        Diabetes Management Status  Statin: Taking  ACE/ARB: Taking    Screening or Prevention Patient's value Goal Complete/Controlled?   HgA1C Testing and Control   Lab Results   Component Value Date    HGBA1C 6.0 (H) 07/31/2022      Annually/Less than 8% Yes   Lipid profile : 06/10/2022 Annually Yes   LDL control Lab Results   Component Value Date    LDLCALC 88.8 06/10/2022    Annually/Less than 100 mg/dl  Yes   Nephropathy screening Lab Results   Component Value Date    MICALBCREAT 1310.7 (H) 09/28/2022     Lab Results   Component Value Date    PROTEINUA 2+ (A) 07/30/2022    Annually No   Blood pressure BP Readings from Last 1 Encounters:   10/27/22 122/72    Less than 140/90 Yes   Dilated retinal exam : 10/18/2022 Annually Yes    Foot exam   : 06/09/2022 Annually Yes     Social History     Socioeconomic History    Marital status: Legally     Number of children: 5   Occupational History    Occupation: disabled    Occupation: PT at [employer]    Tobacco Use    Smoking status: Never    Smokeless tobacco: Never   Substance and Sexual Activity    Alcohol use: Yes     Comment: rare, maybe holidays    Drug use: Not Currently     Types: Cocaine    Sexual activity: Not Currently     Partners: Female   Social History Narrative    Utilizing [employer] transportation which has been unreliable.      Past Medical History:   Diagnosis Date    Arthritis     Asthma     Atrial  fibrillation     Atrial fibrillation with RVR 8/7/2019    CHF (congestive heart failure)     Chronic obstructive pulmonary disease 8/5/2020    Depression     Dermatomyositis     Diabetes mellitus, type 2 Diagnosed in 2000    Elevated PSA     Follow up 7/30/2020    Hypertension     Myocardial infarction     2017    Sleep apnea        Objective:        Physical Exam  Neurological:      Mental Status: He is alert and oriented to person, place, and time. Mental status is at baseline.   Psychiatric:         Mood and Affect: Mood normal.         Behavior: Behavior normal.         Thought Content: Thought content normal.         Judgment: Judgment normal.         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 associated with type 2 diabetes mellitus    Diabetic polyneuropathy associated with type 2 diabetes mellitus    Chronic diastolic heart failure    Coronary artery disease of native artery of native heart with stable angina pectoris    Hypertension associated with diabetes    Hyperlipidemia associated with type 2 diabetes mellitus    Disorder of parathyroid gland    Obesity (BMI 30.0-34.9)      Additional Plan Details:    - POCT Glucose  - Encouraged continuation of lifestyle changes including regular exercise and limiting carbohydrates to 30-45 grams per meal threes times daily and 15 grams per snack with a limit of two daily.   - Encouraged continued monitoring of blood glucose with maintenance of 4 times daily and Continuously with personal CGM Dexcom.  order to [facility].    - Current DM Medication Regimen: Continue Novolog correction dosing every 3 hours as below.  Continuee Ozempic 2 mg weekly. Continue Jardiance 25 mg daily.   - Health Maintenance standards addressed today: COVID - 19 Vaccine - [facility]   - Nursing Visit: Patient is below goal range for nursing visit  for age group and  Will need blood sugar log review on Monday .   - Follow up in 2 months with A1c prior.    Novolog Correction Dosing every 3 hours as needed OUTSIDE OF EATING  If  - 250, may take 2 units of Novolog  If  - 300, may take 4 units of Novolog  If  - 350, may take 6 units of Novolog  If  - 400, may take 8 units of Novolog  If +, may take 10 units of Novolog - MAX    Blakeney McKnight, PA-C [facility]

## 2022-11-29 NOTE — ED PROVIDER NOTES
Problem: Safety - Adult  Goal: Free from fall injury  Outcome: Progressing     Problem: Pain  Goal: Verbalizes/displays adequate comfort level or baseline comfort level  Outcome: Progressing     Problem: Skin/Tissue Integrity  Goal: Absence of new skin breakdown  Description: 1. Monitor for areas of redness and/or skin breakdown  2. Assess vascular access sites hourly  3. Every 4-6 hours minimum:  Change oxygen saturation probe site  4. Every 4-6 hours:  If on nasal continuous positive airway pressure, respiratory therapy assess nares and determine need for appliance change or resting period.   Outcome: Progressing SCRIBE #1 NOTE: I, Nhung Mtz, am scribing for, and in the presence of, Shantanu Norwood MD. I have scribed the entire note.       History     Chief Complaint   Patient presents with    Headache     HA x2 days with intermittent nausea. Takes prescription with no relief.      Review of patient's allergies indicates:  No Known Allergies      History of Present Illness     HPI    5/9/2019, 10:06 AM  History obtained from the patient      History of Present Illness: Crow Green is a 63 y.o. male patient with a PMHx of JOSE MARIA, a-fib, CHF, DM, and HTN who presents to the Emergency Department for evaluation of L-sided HA which onset gradually 2 days ago. Symptoms are constant and moderate in severity.  No mitigating or exacerbating factors reported. Associated sxs include intermittent nausea. Patient denies any fever, chills, cough, CP, SOB, v/d, abd pain, dizziness, weakness, numbness, and all other sxs at this time. No prior tx. No further complaints or concerns at this time.       Arrival mode: Personal vehicle     PCP: Mateo Lambert DO        Past Medical History:  Past Medical History:   Diagnosis Date    Acute kidney injury     Arthritis     Asthma     Atrial fibrillation     CHF (congestive heart failure)     Depression     Diabetes mellitus, type 2 Diagnosed in 2000    Elevated PSA     Hypertension     Sleep apnea        Past Surgical History:  Past Surgical History:   Procedure Laterality Date    BIOPSY-ARTERY-TEMPORAL Left 9/1/2017    Performed by [facility]in Bishop MD at [facility] OR    CHOLECYSTECTOMY      CHOLECYSTECTOMY-LAPAROSCOPIC N/A 3/17/2017    Performed by Louis O. Jeansonne IV, MD at [facility] OR         Family History:  Family History   Problem Relation Age of Onset    Glaucoma Mother     Cataracts Mother     Cataracts Father     Glaucoma Sister     Cataracts Sister     Glaucoma Maternal Aunt     No Known Problems Brother     No Known Problems Daughter     No Known Problems Son      Prostate cancer Neg Hx        Social History:  Social History     Tobacco Use    Smoking status: Never Smoker    Smokeless tobacco: Never Used   Substance and Sexual Activity    Alcohol use: No    Drug use: No    Sexual activity: Not Currently        Review of Systems     Review of Systems   Constitutional: Negative for chills and fever.   HENT: Negative for sore throat.    Respiratory: Negative for cough and shortness of breath.    Cardiovascular: Negative for chest pain.   Gastrointestinal: Positive for nausea. Negative for abdominal pain, diarrhea and vomiting.   Genitourinary: Negative for dysuria.   Musculoskeletal: Negative for back pain and neck pain.   Skin: Negative for rash.   Neurological: Positive for headaches. Negative for dizziness, weakness and numbness.   Hematological: Does not bruise/bleed easily.   All other systems reviewed and are negative.       Physical Exam     Initial Vitals [05/09/19 0941]   BP Pulse Resp Temp SpO2   114/84 90 16 98.4 °F (36.9 °C) 98 %      MAP       --          Physical Exam  Nursing Notes and Vital Signs Reviewed.  Constitutional: Patient is in no acute distress. Well-developed and well-nourished.  Head: Atraumatic. Normocephalic.  Eyes: PERRL. EOM intact. Conjunctivae are not pale. No scleral icterus.  ENT: Mucous membranes are moist. Oropharynx is clear and symmetric.    Neck: Supple. Full ROM. No lymphadenopathy.  Cardiovascular: Regular rate. Regular rhythm. No murmurs, rubs, or gallops. Distal pulses are 2+ and symmetric.  Pulmonary/Chest: No respiratory distress. Clear to auscultation bilaterally. No wheezing or rales.  Abdominal: Soft and non-distended.  There is no tenderness.  No rebound, guarding, or rigidity. Good bowel sounds.  Genitourinary: No CVA tenderness  Musculoskeletal: Moves all extremities. No obvious deformities. No edema. No calf tenderness.  Skin: Warm and dry.  Neurological:  Alert, awake, and appropriate.  Normal speech.  No acute  "focal neurological deficits are appreciated.  Psychiatric: Normal affect. Good eye contact. Appropriate in content.     ED Course   Procedures  ED Vital Signs:  Vitals:    05/09/19 0941 05/09/19 1046 05/09/19 1101   BP: 114/84 (!) 150/83 (!) 150/82   Pulse: 90 79 84   Resp: 16     Temp: 98.4 °F (36.9 °C)     TempSrc: Oral     SpO2: 98% 98% 95%   Weight: 85.6 kg (188 lb 12.8 oz)     Height: 5' 5" (1.651 m)         Abnormal Lab Results:  Labs Reviewed   CBC W/ AUTO DIFFERENTIAL - Abnormal; Notable for the following components:       Result Value    Hemoglobin 13.8 (*)     Platelets 50 (*)     All other components within normal limits   COMPREHENSIVE METABOLIC PANEL - Abnormal; Notable for the following components:    Sodium 134 (*)     Glucose 271 (*)     Albumin 3.3 (*)     All other components within normal limits   POCT GLUCOSE - Abnormal; Notable for the following components:    POCT Glucose 233 (*)     All other components within normal limits   URINALYSIS, REFLEX TO URINE CULTURE   POCT GLUCOSE MONITORING CONTINUOUS        All Lab Results:  Results for orders placed or performed during the hospital encounter of 05/09/19   CBC auto differential   Result Value Ref Range    WBC 8.89 3.90 - 12.70 K/uL    RBC 4.76 4.60 - 6.20 M/uL    Hemoglobin 13.8 (L) 14.0 - 18.0 g/dL    Hematocrit 41.8 40.0 - 54.0 %    Mean Corpuscular Volume 88 82 - 98 fL    Mean Corpuscular Hemoglobin 29.0 27.0 - 31.0 pg    Mean Corpuscular Hemoglobin Conc 33.0 32.0 - 36.0 g/dL    RDW 13.2 11.5 - 14.5 %    Platelets 50 (L) 150 - 350 K/uL    MPV 11.1 9.2 - 12.9 fL    Gran # (ANC) 5.5 1.8 - 7.7 K/uL    Lymph # 2.6 1.0 - 4.8 K/uL    Mono # 0.6 0.3 - 1.0 K/uL    Eos # 0.1 0.0 - 0.5 K/uL    Baso # 0.02 0.00 - 0.20 K/uL    Gran% 61.9 38.0 - 73.0 %    Lymph% 29.2 18.0 - 48.0 %    Mono% 7.1 4.0 - 15.0 %    Eosinophil% 1.6 0.0 - 8.0 %    Basophil% 0.2 0.0 - 1.9 %    Differential Method Automated    Comprehensive metabolic panel   Result Value Ref Range "    Sodium 134 (L) 136 - 145 mmol/L    Potassium 4.0 3.5 - 5.1 mmol/L    Chloride 96 95 - 110 mmol/L    CO2 28 23 - 29 mmol/L    Glucose 271 (H) 70 - 110 mg/dL    BUN, Bld 14 8 - 23 mg/dL    Creatinine 1.2 0.5 - 1.4 mg/dL    Calcium 9.6 8.7 - 10.5 mg/dL    Total Protein 7.5 6.0 - 8.4 g/dL    Albumin 3.3 (L) 3.5 - 5.2 g/dL    Total Bilirubin 0.2 0.1 - 1.0 mg/dL    Alkaline Phosphatase 96 55 - 135 U/L    AST 15 10 - 40 U/L    ALT 19 10 - 44 U/L    Anion Gap 10 8 - 16 mmol/L    eGFR if African American >60 >60 mL/min/1.73 m^2    eGFR if non African American >60 >60 mL/min/1.73 m^2   POCT glucose   Result Value Ref Range    POCT Glucose 233 (H) 70 - 110 mg/dL       Imaging Results:  Imaging Results          CT Head Without Contrast (Final result)  Result time 05/09/19 10:24:01    Final result by Devon Singleton MD (05/09/19 10:24:01)                 Impression:      Chronic microvascular ischemic changes.    All CT scans at this facility use dose modulation, iterative reconstruction, and/or weight based dosing when appropriate to reduce radiation dose to as low as reasonable achievable.      Electronically signed by: Devon Singleton MD  Date:    05/09/2019  Time:    10:24             Narrative:    EXAMINATION:  CT HEAD WITHOUT CONTRAST    CLINICAL HISTORY:  Headache, chronic, new features or neuro deficit;    TECHNIQUE:  Low dose axial CT images obtained throughout the head without intravenous contrast. Sagittal and coronal reconstructions were performed.    COMPARISON:  09/18/2018.    FINDINGS:  Intracranial compartment:    The brain parenchyma demonstrates areas of decreased attenuation with mild to moderate periventricular white matter consistent with chronic microvascular ischemic changes..  No parenchymal mass, hemorrhage, edema or major vascular distribution infarct.  Vascular calcifications are noted.    Mild prominence of the sulci and ventricles are consistent with age-related involutional changes.    No  extra-axial blood or fluid collections.    Skull/extracranial contents (limited evaluation): No fracture.  Mild ethmoid mucosal thickening.  Mastoid air cells and paranasal sinuses are essentially clear.                                        The Emergency Provider reviewed the vital signs and test results, which are outlined above.     ED Discussion     11:51 AM: Reassessed pt at this time. Patient is awake, alert, and in NAD. Pt states his condition has improved at this time. Discussed with pt all pertinent ED information and results. Discussed pt dx and plan of tx. Gave pt all f/u and return to the ED instructions. All questions and concerns were addressed at this time. Pt expresses understanding of information and instructions, and is comfortable with plan to discharge. Pt is stable for discharge.    Patient's headache is either consistent with previous headache and/or lacks features concerning for emergent or life threatening condition.  I do not suspect SAH, meningitis, increased IC pressure, infectious, toxic, vascular, CNS, or other EMC.  I have discussed this at length with patient and/or family/caretaker.    I discussed with patient and/or family/caretaker that evaluation in the ED does not suggest any emergent or life threatening medical conditions requiring immediate intervention beyond what was provided in the ED, and I believe patient is safe for discharge.  Regardless, an unremarkable evaluation in the ED does not preclude the development or presence of a serious of life threatening condition. As such, patient was instructed to return immediately for any worsening or change in current symptoms.    ED Medication(s):  Medications   sodium chloride 0.9% bolus 1,000 mL (1,000 mLs Intravenous New Bag 5/9/19 1040)   ondansetron injection 4 mg (4 mg Intravenous Given 5/9/19 1040)   ketorolac injection 15 mg (15 mg Intravenous Given 5/9/19 1108)   butalbital-acetaminophen-caffeine -40 mg per tablet 1  tablet (1 tablet Oral Given 5/9/19 1104)       New Prescriptions    BUTALBITAL-ACETAMINOPHEN-CAFFEINE -40 MG (FIORICET, ESGIC) -40 MG PER TABLET    Take 1 tablet by mouth every 4 (four) hours as needed for Pain.       Follow-up Information     Mateo Lambert DO.    Specialty:  Family Medicine  Contact information:  [address] [address] [address] [phone]                         Medical Decision Making:   Clinical Tests:   Lab Tests: Reviewed and Ordered  Radiological Study: Reviewed and Ordered             Scribe Attestation:   Scribe #1: I performed the above scribed service and the documentation accurately describes the services I performed. I attest to the accuracy of the note.     Attending:   Physician Attestation Statement for Scribe #1: I, Shantanu Norwood MD, personally performed the services described in this documentation, as scribed by Nhung Mtz, in my presence, and it is both accurate and complete.           Clinical Impression       ICD-10-CM ICD-9-CM   1. Nonintractable headache, unspecified chronicity pattern, unspecified headache type R51 784.0   2. Hyperglycemia R73.9 790.29       Disposition:   Disposition: Discharged  Condition: Stable         Shantanu Norwood MD  05/09/19 6287

## 2022-12-01 NOTE — PROGRESS NOTES
Outpatient Care Management  Patient Not Qualified    Patient: Crow Green  MRN:  [id number]  Date of Service:  12/1/2022  Completed by:  Avni Potts RN    Chief Complaint   Patient presents with    OPCM Chart Review     8/30/22 9/7/22 9/14/22 10/17/22 Mr. Green is at appointments today and tomorrow will reschedule call  10/25/22  12/1/22    OPCM RN First Follow-Up Attempt     8/30/22    OPCM RN Second Follow-Up Attempt     9/7/22 letter sent requesting call back     OPCM RN Third Follow-Up Attempt     9/14/22 10/25/22    Case Closure     12/1/22       Patient Summary                Unable to contact pt after three phone calls and one letter requesting call back.

## 2022-12-06 PROBLEM — I50.32 CHRONIC DIASTOLIC HEART FAILURE: Status: RESOLVED | Noted: 2018-05-11 | Resolved: 2022-12-06

## 2022-12-06 PROBLEM — J18.9 PNEUMONIA: Status: RESOLVED | Noted: 2022-07-31 | Resolved: 2022-12-06

## 2022-12-06 NOTE — PROGRESS NOTES
I have reviewed the positive depression score which warrants active treatment with psychotherapy and/or medications. ***  I offered to discuss advanced care planning, including how to pick a person who would make decisions for you if you were unable to make them for yourself, called a health care power of , and what kind of decisions you might make such as use of life sustaining treatments such as ventilators and tube feeding when faced with a life limiting illness recorded on a living will that they will need to know. (How you want to be cared for as you near the end of your natural life)     X Patient is interested in learning more about how to make advanced directives.  I provided them paperwork and offered to discuss this with them.

## 2022-12-07 ENCOUNTER — OFFICE VISIT (OUTPATIENT)
Dept: INTERNAL MEDICINE | Facility: CLINIC | Age: 67
End: 2022-12-07
Payer: MEDICARE

## 2022-12-07 ENCOUNTER — PATIENT MESSAGE (OUTPATIENT)
Dept: PSYCHIATRY | Facility: CLINIC | Age: 67
End: 2022-12-07
Payer: MEDICARE

## 2022-12-07 VITALS
HEIGHT: 65 IN | BODY MASS INDEX: 31.22 KG/M2 | DIASTOLIC BLOOD PRESSURE: 64 MMHG | WEIGHT: 187.38 LBS | SYSTOLIC BLOOD PRESSURE: 122 MMHG | HEART RATE: 78 BPM | RESPIRATION RATE: 20 BRPM

## 2022-12-07 DIAGNOSIS — I50.42 CHRONIC COMBINED SYSTOLIC AND DIASTOLIC HEART FAILURE: ICD-10-CM

## 2022-12-07 DIAGNOSIS — I25.118 CORONARY ARTERY DISEASE OF NATIVE ARTERY OF NATIVE HEART WITH STABLE ANGINA PECTORIS: Chronic | ICD-10-CM

## 2022-12-07 DIAGNOSIS — R26.81 GAIT INSTABILITY: ICD-10-CM

## 2022-12-07 DIAGNOSIS — I27.20 PULMONARY HYPERTENSION: ICD-10-CM

## 2022-12-07 DIAGNOSIS — M33.13 DERMATOMYOSITIS: ICD-10-CM

## 2022-12-07 DIAGNOSIS — F19.10 SUBSTANCE ABUSE: ICD-10-CM

## 2022-12-07 DIAGNOSIS — R26.9 ABNORMALITY OF GAIT AND MOBILITY: ICD-10-CM

## 2022-12-07 DIAGNOSIS — M1A.09X0 CHRONIC GOUT OF MULTIPLE SITES, UNSPECIFIED CAUSE: ICD-10-CM

## 2022-12-07 DIAGNOSIS — H40.1194 INDETERMINATE STAGE CHRONIC OPEN ANGLE GLAUCOMA, UNSPECIFIED LATERALITY: ICD-10-CM

## 2022-12-07 DIAGNOSIS — Z74.09 OTHER REDUCED MOBILITY: ICD-10-CM

## 2022-12-07 DIAGNOSIS — J44.9 CHRONIC OBSTRUCTIVE PULMONARY DISEASE, UNSPECIFIED COPD TYPE: ICD-10-CM

## 2022-12-07 DIAGNOSIS — F33.1 MDD (MAJOR DEPRESSIVE DISORDER), RECURRENT EPISODE, MODERATE: ICD-10-CM

## 2022-12-07 DIAGNOSIS — Z79.4 TYPE 2 DIABETES MELLITUS WITH STAGE 3A CHRONIC KIDNEY DISEASE, WITH LONG-TERM CURRENT USE OF INSULIN: ICD-10-CM

## 2022-12-07 DIAGNOSIS — M54.16 LUMBAR RADICULOPATHY: ICD-10-CM

## 2022-12-07 DIAGNOSIS — F14.10 COCAINE ABUSE: ICD-10-CM

## 2022-12-07 DIAGNOSIS — Z89.412 ACQUIRED ABSENCE OF LEFT GREAT TOE: ICD-10-CM

## 2022-12-07 DIAGNOSIS — Z00.00 ENCOUNTER FOR PREVENTATIVE ADULT HEALTH CARE EXAMINATION: Primary | ICD-10-CM

## 2022-12-07 DIAGNOSIS — E66.9 OBESITY (BMI 30.0-34.9): ICD-10-CM

## 2022-12-07 DIAGNOSIS — J45.40 MODERATE PERSISTENT ASTHMA WITHOUT COMPLICATION: ICD-10-CM

## 2022-12-07 DIAGNOSIS — E11.22 TYPE 2 DIABETES MELLITUS WITH STAGE 3A CHRONIC KIDNEY DISEASE, WITH LONG-TERM CURRENT USE OF INSULIN: ICD-10-CM

## 2022-12-07 DIAGNOSIS — F25.1 SCHIZOAFFECTIVE DISORDER, DEPRESSIVE TYPE: ICD-10-CM

## 2022-12-07 DIAGNOSIS — N18.31 TYPE 2 DIABETES MELLITUS WITH STAGE 3A CHRONIC KIDNEY DISEASE, WITH LONG-TERM CURRENT USE OF INSULIN: ICD-10-CM

## 2022-12-07 DIAGNOSIS — M51.26 HNP (HERNIATED NUCLEUS PULPOSUS), LUMBAR: ICD-10-CM

## 2022-12-07 DIAGNOSIS — G47.33 OSA (OBSTRUCTIVE SLEEP APNEA): ICD-10-CM

## 2022-12-07 DIAGNOSIS — E78.5 HYPERLIPIDEMIA ASSOCIATED WITH TYPE 2 DIABETES MELLITUS: ICD-10-CM

## 2022-12-07 DIAGNOSIS — M47.26 OSTEOARTHRITIS OF SPINE WITH RADICULOPATHY, LUMBAR REGION: ICD-10-CM

## 2022-12-07 DIAGNOSIS — E21.5 DISORDER OF PARATHYROID GLAND: ICD-10-CM

## 2022-12-07 DIAGNOSIS — G89.29 OTHER CHRONIC PAIN: ICD-10-CM

## 2022-12-07 DIAGNOSIS — E11.621 DIABETIC ULCER OF TOE OF LEFT FOOT ASSOCIATED WITH TYPE 2 DIABETES MELLITUS, WITH NECROSIS OF MUSCLE: ICD-10-CM

## 2022-12-07 DIAGNOSIS — Z00.00 ENCOUNTER FOR PREVENTIVE HEALTH EXAMINATION: ICD-10-CM

## 2022-12-07 DIAGNOSIS — L97.523 DIABETIC ULCER OF TOE OF LEFT FOOT ASSOCIATED WITH TYPE 2 DIABETES MELLITUS, WITH NECROSIS OF MUSCLE: ICD-10-CM

## 2022-12-07 DIAGNOSIS — F41.1 GAD (GENERALIZED ANXIETY DISORDER): ICD-10-CM

## 2022-12-07 DIAGNOSIS — M85.80 OSTEOPENIA, UNSPECIFIED LOCATION: ICD-10-CM

## 2022-12-07 DIAGNOSIS — I70.0 ATHEROSCLEROSIS OF AORTA: ICD-10-CM

## 2022-12-07 DIAGNOSIS — E11.69 HYPERLIPIDEMIA ASSOCIATED WITH TYPE 2 DIABETES MELLITUS: ICD-10-CM

## 2022-12-07 DIAGNOSIS — E11.3312 MODERATE NONPROLIFERATIVE DIABETIC RETINOPATHY OF LEFT EYE WITH MACULAR EDEMA ASSOCIATED WITH TYPE 2 DIABETES MELLITUS: ICD-10-CM

## 2022-12-07 DIAGNOSIS — D84.9 IMMUNOCOMPROMISED PATIENT: ICD-10-CM

## 2022-12-07 DIAGNOSIS — Z99.81 DEPENDENCE ON SUPPLEMENTAL OXYGEN: ICD-10-CM

## 2022-12-07 DIAGNOSIS — M19.90 ARTHRITIS: ICD-10-CM

## 2022-12-07 DIAGNOSIS — I25.110 ATHEROSCLEROSIS OF NATIVE CORONARY ARTERY OF NATIVE HEART WITH UNSTABLE ANGINA PECTORIS: ICD-10-CM

## 2022-12-07 DIAGNOSIS — I65.23 BILATERAL CAROTID ARTERY STENOSIS: ICD-10-CM

## 2022-12-07 DIAGNOSIS — E11.42 DIABETIC POLYNEUROPATHY ASSOCIATED WITH TYPE 2 DIABETES MELLITUS: ICD-10-CM

## 2022-12-07 DIAGNOSIS — Z13.31 POSITIVE DEPRESSION SCREENING: ICD-10-CM

## 2022-12-07 PROCEDURE — 3066F PR DOCUMENTATION OF TREATMENT FOR NEPHROPATHY: ICD-10-PCS | Mod: CPTII,S$GLB,, | Performed by: NURSE PRACTITIONER

## 2022-12-07 PROCEDURE — 1160F PR REVIEW ALL MEDS BY PRESCRIBER/CLIN PHARMACIST DOCUMENTED: ICD-10-PCS | Mod: CPTII,S$GLB,, | Performed by: NURSE PRACTITIONER

## 2022-12-07 PROCEDURE — 3078F DIAST BP <80 MM HG: CPT | Mod: CPTII,S$GLB,, | Performed by: NURSE PRACTITIONER

## 2022-12-07 PROCEDURE — G9919 PR SCREENING AND POSITIVE: ICD-10-PCS | Mod: CPTII,S$GLB,, | Performed by: NURSE PRACTITIONER

## 2022-12-07 PROCEDURE — 1100F PR PT FALLS ASSESS DOC 2+ FALLS/FALL W/INJURY/YR: ICD-10-PCS | Mod: CPTII,S$GLB,, | Performed by: NURSE PRACTITIONER

## 2022-12-07 PROCEDURE — G0402 INITIAL PREVENTIVE EXAM: HCPCS | Mod: S$GLB,,, | Performed by: NURSE PRACTITIONER

## 2022-12-07 PROCEDURE — 3062F PR POS MACROALBUMINURIA RESULT DOCUMENTED/REVIEW: ICD-10-PCS | Mod: CPTII,S$GLB,, | Performed by: NURSE PRACTITIONER

## 2022-12-07 PROCEDURE — 3062F POS MACROALBUMINURIA REV: CPT | Mod: CPTII,S$GLB,, | Performed by: NURSE PRACTITIONER

## 2022-12-07 PROCEDURE — 99999 PR PBB SHADOW E&M-EST. PATIENT-LVL V: ICD-10-PCS | Mod: PBBFAC,,, | Performed by: NURSE PRACTITIONER

## 2022-12-07 PROCEDURE — 4010F PR ACE/ARB THEARPY RXD/TAKEN: ICD-10-PCS | Mod: CPTII,S$GLB,, | Performed by: NURSE PRACTITIONER

## 2022-12-07 PROCEDURE — 1159F MED LIST DOCD IN RCRD: CPT | Mod: CPTII,S$GLB,, | Performed by: NURSE PRACTITIONER

## 2022-12-07 PROCEDURE — 3288F FALL RISK ASSESSMENT DOCD: CPT | Mod: CPTII,S$GLB,, | Performed by: NURSE PRACTITIONER

## 2022-12-07 PROCEDURE — 4010F ACE/ARB THERAPY RXD/TAKEN: CPT | Mod: CPTII,S$GLB,, | Performed by: NURSE PRACTITIONER

## 2022-12-07 PROCEDURE — 3078F PR MOST RECENT DIASTOLIC BLOOD PRESSURE < 80 MM HG: ICD-10-PCS | Mod: CPTII,S$GLB,, | Performed by: NURSE PRACTITIONER

## 2022-12-07 PROCEDURE — G9919 SCRN ND POS ND PROV OF REC: HCPCS | Mod: CPTII,S$GLB,, | Performed by: NURSE PRACTITIONER

## 2022-12-07 PROCEDURE — 3044F HG A1C LEVEL LT 7.0%: CPT | Mod: CPTII,S$GLB,, | Performed by: NURSE PRACTITIONER

## 2022-12-07 PROCEDURE — 99499 RISK ADDL DX/OHS AUDIT: ICD-10-PCS | Mod: S$GLB,,, | Performed by: NURSE PRACTITIONER

## 2022-12-07 PROCEDURE — G0402 PR WELCOME MEDICARE PREVENTIVE VISIT NEW ENROLLEE: ICD-10-PCS | Mod: S$GLB,,, | Performed by: NURSE PRACTITIONER

## 2022-12-07 PROCEDURE — 3074F SYST BP LT 130 MM HG: CPT | Mod: CPTII,S$GLB,, | Performed by: NURSE PRACTITIONER

## 2022-12-07 PROCEDURE — 99499 UNLISTED E&M SERVICE: CPT | Mod: S$GLB,,, | Performed by: NURSE PRACTITIONER

## 2022-12-07 PROCEDURE — 3008F PR BODY MASS INDEX (BMI) DOCUMENTED: ICD-10-PCS | Mod: CPTII,S$GLB,, | Performed by: NURSE PRACTITIONER

## 2022-12-07 PROCEDURE — 1159F PR MEDICATION LIST DOCUMENTED IN MEDICAL RECORD: ICD-10-PCS | Mod: CPTII,S$GLB,, | Performed by: NURSE PRACTITIONER

## 2022-12-07 PROCEDURE — 99999 PR PBB SHADOW E&M-EST. PATIENT-LVL V: CPT | Mod: PBBFAC,,, | Performed by: NURSE PRACTITIONER

## 2022-12-07 PROCEDURE — 3044F PR MOST RECENT HEMOGLOBIN A1C LEVEL <7.0%: ICD-10-PCS | Mod: CPTII,S$GLB,, | Performed by: NURSE PRACTITIONER

## 2022-12-07 PROCEDURE — 3288F PR FALLS RISK ASSESSMENT DOCUMENTED: ICD-10-PCS | Mod: CPTII,S$GLB,, | Performed by: NURSE PRACTITIONER

## 2022-12-07 PROCEDURE — 1100F PTFALLS ASSESS-DOCD GE2>/YR: CPT | Mod: CPTII,S$GLB,, | Performed by: NURSE PRACTITIONER

## 2022-12-07 PROCEDURE — 3066F NEPHROPATHY DOC TX: CPT | Mod: CPTII,S$GLB,, | Performed by: NURSE PRACTITIONER

## 2022-12-07 PROCEDURE — 1160F RVW MEDS BY RX/DR IN RCRD: CPT | Mod: CPTII,S$GLB,, | Performed by: NURSE PRACTITIONER

## 2022-12-07 PROCEDURE — 3008F BODY MASS INDEX DOCD: CPT | Mod: CPTII,S$GLB,, | Performed by: NURSE PRACTITIONER

## 2022-12-07 PROCEDURE — 3074F PR MOST RECENT SYSTOLIC BLOOD PRESSURE < 130 MM HG: ICD-10-PCS | Mod: CPTII,S$GLB,, | Performed by: NURSE PRACTITIONER

## 2022-12-07 NOTE — PATIENT INSTRUCTIONS
Counseling and Referral of Other Preventative  (Italic type indicates deductible and co-insurance are waived)    Patient Name: Crow Green  Today's Date: 12/7/2022    Health Maintenance       Date Due Completion Date    COVID-19 Vaccine (4 - Booster for Pfizer series) 01/14/2022 11/19/2021    Hemoglobin A1c 01/31/2023 7/31/2022    Foot Exam 06/09/2023 6/9/2022 (Done)    Override on 6/9/2022: Done    Override on 6/3/2021: Done    Override on 2/21/2020: Done    Lipid Panel 06/10/2023 6/10/2022    Diabetes Urine Screening 09/28/2023 9/28/2022    Eye Exam 10/18/2023 10/18/2022    PROSTATE-SPECIFIC ANTIGEN 11/07/2023 11/7/2022    High Dose Statin 12/07/2023 12/7/2022    TETANUS VACCINE 07/23/2030 7/23/2020    Colorectal Cancer Screening 03/04/2032 3/4/2022        Orders Placed This Encounter   Procedures    Ambulatory referral/consult to Behavioral Health    Ambulatory referral/consult to Outpatient Case Management    Ambulatory referral/consult to Psychiatry       The following information is provided to all patients.  This information is to help you find resources for any of the problems found today that may be affecting your health:                Living healthy guide: www.[facility].[place].gov      Understanding Diabetes: www.diabetes.org      Eating healthy: www.cdc.gov/healthyweight      CDC home safety checklist: www.cdc.gov/steadi/patient.html      Agency on Aging: www.goea.[place].[facility]      Alcoholics anonymous (AA): www.aa.org      Physical Activity: www.fabrice.nih.gov/cc2mihy      Tobacco use: www.quitwithusla.org      Last office visit 12/17/18 and has an appointment 04/15/19. Refill sent to pharmacy.

## 2022-12-07 NOTE — PROGRESS NOTES
"I have reviewed the positive depression score which warrants active treatment with psychotherapy and/or medications.    I offered to discuss advanced care planning, including how to pick a person who would make decisions for you if you were unable to make them for yourself, called a health care power of , and what kind of decisions you might make such as use of life sustaining treatments such as ventilators and tube feeding when faced with a life limiting illness recorded on a living will that they will need to know. (How you want to be cared for as you near the end of your natural life)     X Patient is interested in learning more about how to make advanced directives.  I provided them paperwork and offered to discuss this with them.  Crow Green presented for a  Medicare AWV and comprehensive Health Risk Assessment today. The following components were reviewed and updated:    Medical history  Family History  Social history  Allergies and Current Medications  Health Risk Assessment  Health Maintenance  Care Team     Patient screened moderate and/or high risk for one or more social determinants of health (SDOH). Patient connected to community resources through the ED Navigator.      ** See Completed Assessments for Annual Wellness Visit within the encounter summary.**         The following assessments were completed:  Living Situation  CAGE  Depression Screening  Timed Get Up and Go  Whisper Test  Cognitive Function Screening  Nutrition Screening  ADL Screening  PAQ Screening        Vitals:    12/07/22 1014   BP: 122/64   BP Location: Right arm   Patient Position: Sitting   Pulse: 78   Resp: 20   Weight: 85 kg (187 lb 6.3 oz)   Height:    Pain scale 5' 5" (1.651 m)    Chronic pain 5/5 back, shoulders     Body mass index is 31.18 kg/m².  Physical Exam  Constitutional:       General: He is not in acute distress.     Appearance: He is obese. He is not ill-appearing or diaphoretic.   HENT:      Head: " Normocephalic and atraumatic.      Mouth/Throat:      Mouth: Mucous membranes are moist.   Eyes:      General:         Right eye: No discharge.         Left eye: No discharge.      Extraocular Movements: Extraocular movements intact.      Conjunctiva/sclera: Conjunctivae normal.   Cardiovascular:      Rate and Rhythm: Normal rate and regular rhythm.      Heart sounds: Normal heart sounds. No murmur heard.  Pulmonary:      Effort: Pulmonary effort is normal. No respiratory distress.      Comments: BBS diminished  Abdominal:      General: There is no distension.      Palpations: Abdomen is soft.      Tenderness: There is no abdominal tenderness. There is no guarding.      Comments: Obese   Genitourinary:     Comments: Not examined  Musculoskeletal:      Cervical back: Normal range of motion and neck supple.      Right lower leg: No edema.      Left lower leg: No edema.   Skin:     General: Skin is warm and dry.   Neurological:      General: No focal deficit present.      Mental Status: He is alert and oriented to person, place, and time. Mental status is at baseline.      Cranial Nerves: No cranial nerve deficit.   Psychiatric:         Mood and Affect: Mood normal.         Behavior: Behavior normal.         Thought Content: Thought content normal.         Judgment: Judgment normal.           Diagnoses and health risks identified today and associated recommendations/orders:    1. Encounter for preventative adult health care examination  Review for opioid screening: Patient does not have Rx for Opioids  Review for substance use disorder: Patient does not use substance per chart    Patient reports he rarely drinks alcohol    I have reviewed the positive depression score which warrants active treatment with psychotherapy and/or medications.    I offered to discuss advanced care planning, including how to pick a person who would make decisions for you if you were unable to make them for yourself, called a health care power  of , and what kind of decisions you might make such as use of life sustaining treatments such as ventilators and tube feeding when faced with a life limiting illness recorded on a living will that they will need to know. (How you want to be cared for as you near the end of your natural life)     X Patient is interested in learning more about how to make advanced directives.  I provided them paperwork and offered to discuss this with them.    2. Diabetic polyneuropathy associated with type 2 diabetes mellitus  Monitor  Stable  Continue home neurontin, novolog insulin, jardiance, ozempic      3. Moderate nonproliferative diabetic retinopathy of both eyes associated with type 2 diabetes mellitus, macular edema presence unspecified, Indeterminate stage chronic open angle glaucoma, unspecified laterality  Monitor  Follow up with ophtho as directed    4. Hyperlipidemia associated with type 2 diabetes mellitus  Monitor  Stable  Continue home liptor      5. Diabetic ulcer of toe of left foot associated with type 2 diabetes mellitus, with necrosis of muscle  Monitor  Follow up with Podiatry as directed    6. Type 2 diabetes mellitus with stage 3a chronic kidney disease, with long-term current use of insulin  Monitor  Follow up with PCP    7.  Moderate persistent asthma without complication, Chronic obstructive pulmonary disease, unspecified COPD type, SANDRITA (obstructive sleep apnea), Dependence on supplemental oxygen  Monitor  Stable  Continue home atrovent nebulizer, spiriva, home O2  Follow up with Pulmonology as directed    8. Positive depression screening- MDD (major depressive disorder), recurrent episode, moderate, Schizoaffective disorder, depressive type, DOMINIK (generalized anxiety disorder)  Monitor  Stable  Continue home zolft, trazadone  Ambulatory referral to case management, behavioral health, and Psych ordered     9. Disorder of parathyroid gland  Monitor  Follow up with PCP    10. Pulmonary hypertension-  echo 7/2022  Monitor  Follow up with Cardiology and Pulmonology    11. Atherosclerosis of aorta, Atherosclerosis of native coronary artery of native heart with unstable angina pectoris, Nonobstructive coronary artery disease of native artery of native heart, Bilateral carotid artery stenosis  Monitor  Stable  Continue home asa, eliquis, renexa, entresto,     12. Chronic combined systolic and diastolic heart failure  Monitor  Stable  Continue home lasix, entresto    13. Acquired absence of left great toe, Gait instability  Monitor  Fall precautions  Walks with rollator walker, uses wheel chair    14. Dermatomyositis, Immunocompromised patient  Monitor  Continue home imuran   Follow up with doctor as directed    15. History of Substance abuse(UDS + for cocaine ), Cocaine abuse  Monitor for episodes of relapse  Stable at this time     16. Arthritis, Lumbar radiculopathy, HNP (herniated nucleus pulposus), lumbar, Osteoarthritis of spine with radiculopathy, lumbar region, Other chronic pain, Abnormality of gait and mobility, Other reduced mobility  Monitor  Continue home neurontin, baclofen    17.  Obesity (BMI 30.0-34.9)  Monitor  Healthy life choices    18. Chronic gout of multiple sites, unspecified cause  Monitor  Stable  Continue home allopurinol    19. Osteopenia, unspecified location    Monitor  Follow up with PCP           Provided Crow with a 5-10 year written screening schedule and personal prevention plan. Recommendations were developed using the USPSTF age appropriate recommendations. Education, counseling, and referrals were provided as needed. After Visit Summary printed and given to patient which includes a list of additional screenings\tests needed.    Follow up in about 1 year (around 12/7/2023) for Annual wellness visit.    ROMIE Roach

## 2022-12-08 ENCOUNTER — PATIENT OUTREACH (OUTPATIENT)
Dept: PULMONOLOGY | Facility: CLINIC | Age: 67
End: 2022-12-08
Payer: MEDICARE

## 2022-12-09 ENCOUNTER — TELEPHONE (OUTPATIENT)
Dept: INTERNAL MEDICINE | Facility: CLINIC | Age: 67
End: 2022-12-09
Payer: MEDICARE

## 2022-12-09 NOTE — TELEPHONE ENCOUNTER
----- Message from Dayron Rendon sent at 12/9/2022  4:22 PM CST -----  Contact: self [phone]  Pt is calling requesting home health . Please call back at [phone] . Thanks/julio cesar

## 2022-12-09 NOTE — TELEPHONE ENCOUNTER
Contacted pt to inquire on why home health was being requested but left a message for pt to return call to clinic on Monday morning./Nahum

## 2022-12-12 ENCOUNTER — TELEPHONE (OUTPATIENT)
Dept: INTERNAL MEDICINE | Facility: CLINIC | Age: 67
End: 2022-12-12
Payer: MEDICARE

## 2022-12-12 ENCOUNTER — PATIENT MESSAGE (OUTPATIENT)
Dept: INTERNAL MEDICINE | Facility: CLINIC | Age: 67
End: 2022-12-12
Payer: MEDICARE

## 2022-12-13 ENCOUNTER — OFFICE VISIT (OUTPATIENT)
Dept: UROLOGY | Facility: CLINIC | Age: 67
End: 2022-12-13
Payer: MEDICARE

## 2022-12-13 VITALS
BODY MASS INDEX: 31.16 KG/M2 | HEIGHT: 65 IN | SYSTOLIC BLOOD PRESSURE: 155 MMHG | WEIGHT: 187 LBS | DIASTOLIC BLOOD PRESSURE: 80 MMHG | HEART RATE: 86 BPM

## 2022-12-13 DIAGNOSIS — R39.12 BENIGN PROSTATIC HYPERPLASIA WITH WEAK URINARY STREAM: ICD-10-CM

## 2022-12-13 DIAGNOSIS — N40.1 BENIGN PROSTATIC HYPERPLASIA WITH WEAK URINARY STREAM: ICD-10-CM

## 2022-12-13 DIAGNOSIS — N52.01 ERECTILE DYSFUNCTION DUE TO ARTERIAL INSUFFICIENCY: Primary | ICD-10-CM

## 2022-12-13 LAB
BILIRUB SERPL-MCNC: NORMAL MG/DL
BLOOD URINE, POC: NORMAL
CLARITY, POC UA: CLEAR
COLOR, POC UA: YELLOW
GLUCOSE UR QL STRIP: 500
KETONES UR QL STRIP: NORMAL
LEUKOCYTE ESTERASE URINE, POC: NORMAL
NITRITE, POC UA: NORMAL
PH, POC UA: 8
POC RESIDUAL URINE VOLUME: 24 ML (ref 0–100)
PROTEIN, POC: NORMAL
SPECIFIC GRAVITY, POC UA: NORMAL
UROBILINOGEN, POC UA: NORMAL

## 2022-12-13 PROCEDURE — 3066F NEPHROPATHY DOC TX: CPT | Mod: CPTII,S$GLB,, | Performed by: NURSE PRACTITIONER

## 2022-12-13 PROCEDURE — 4010F ACE/ARB THERAPY RXD/TAKEN: CPT | Mod: CPTII,S$GLB,, | Performed by: NURSE PRACTITIONER

## 2022-12-13 PROCEDURE — 3079F PR MOST RECENT DIASTOLIC BLOOD PRESSURE 80-89 MM HG: ICD-10-PCS | Mod: CPTII,S$GLB,, | Performed by: NURSE PRACTITIONER

## 2022-12-13 PROCEDURE — 3077F PR MOST RECENT SYSTOLIC BLOOD PRESSURE >= 140 MM HG: ICD-10-PCS | Mod: CPTII,S$GLB,, | Performed by: NURSE PRACTITIONER

## 2022-12-13 PROCEDURE — 1159F MED LIST DOCD IN RCRD: CPT | Mod: CPTII,S$GLB,, | Performed by: NURSE PRACTITIONER

## 2022-12-13 PROCEDURE — 99999 PR PBB SHADOW E&M-EST. PATIENT-LVL V: CPT | Mod: PBBFAC,,, | Performed by: NURSE PRACTITIONER

## 2022-12-13 PROCEDURE — 3077F SYST BP >= 140 MM HG: CPT | Mod: CPTII,S$GLB,, | Performed by: NURSE PRACTITIONER

## 2022-12-13 PROCEDURE — 81002 URINALYSIS NONAUTO W/O SCOPE: CPT | Mod: S$GLB,,, | Performed by: NURSE PRACTITIONER

## 2022-12-13 PROCEDURE — 51798 US URINE CAPACITY MEASURE: CPT | Mod: S$GLB,,, | Performed by: NURSE PRACTITIONER

## 2022-12-13 PROCEDURE — 3008F PR BODY MASS INDEX (BMI) DOCUMENTED: ICD-10-PCS | Mod: CPTII,S$GLB,, | Performed by: NURSE PRACTITIONER

## 2022-12-13 PROCEDURE — 3044F HG A1C LEVEL LT 7.0%: CPT | Mod: CPTII,S$GLB,, | Performed by: NURSE PRACTITIONER

## 2022-12-13 PROCEDURE — 3008F BODY MASS INDEX DOCD: CPT | Mod: CPTII,S$GLB,, | Performed by: NURSE PRACTITIONER

## 2022-12-13 PROCEDURE — 4010F PR ACE/ARB THEARPY RXD/TAKEN: ICD-10-PCS | Mod: CPTII,S$GLB,, | Performed by: NURSE PRACTITIONER

## 2022-12-13 PROCEDURE — 81002 POCT URINE DIPSTICK WITHOUT MICROSCOPE: ICD-10-PCS | Mod: S$GLB,,, | Performed by: NURSE PRACTITIONER

## 2022-12-13 PROCEDURE — 1159F PR MEDICATION LIST DOCUMENTED IN MEDICAL RECORD: ICD-10-PCS | Mod: CPTII,S$GLB,, | Performed by: NURSE PRACTITIONER

## 2022-12-13 PROCEDURE — 3044F PR MOST RECENT HEMOGLOBIN A1C LEVEL <7.0%: ICD-10-PCS | Mod: CPTII,S$GLB,, | Performed by: NURSE PRACTITIONER

## 2022-12-13 PROCEDURE — 51798 POCT BLADDER SCAN: ICD-10-PCS | Mod: S$GLB,,, | Performed by: NURSE PRACTITIONER

## 2022-12-13 PROCEDURE — 99204 PR OFFICE/OUTPT VISIT, NEW, LEVL IV, 45-59 MIN: ICD-10-PCS | Mod: S$GLB,,, | Performed by: NURSE PRACTITIONER

## 2022-12-13 PROCEDURE — 3079F DIAST BP 80-89 MM HG: CPT | Mod: CPTII,S$GLB,, | Performed by: NURSE PRACTITIONER

## 2022-12-13 PROCEDURE — 3066F PR DOCUMENTATION OF TREATMENT FOR NEPHROPATHY: ICD-10-PCS | Mod: CPTII,S$GLB,, | Performed by: NURSE PRACTITIONER

## 2022-12-13 PROCEDURE — 3062F POS MACROALBUMINURIA REV: CPT | Mod: CPTII,S$GLB,, | Performed by: NURSE PRACTITIONER

## 2022-12-13 PROCEDURE — 99204 OFFICE O/P NEW MOD 45 MIN: CPT | Mod: S$GLB,,, | Performed by: NURSE PRACTITIONER

## 2022-12-13 PROCEDURE — 99999 PR PBB SHADOW E&M-EST. PATIENT-LVL V: ICD-10-PCS | Mod: PBBFAC,,, | Performed by: NURSE PRACTITIONER

## 2022-12-13 PROCEDURE — 3062F PR POS MACROALBUMINURIA RESULT DOCUMENTED/REVIEW: ICD-10-PCS | Mod: CPTII,S$GLB,, | Performed by: NURSE PRACTITIONER

## 2022-12-13 NOTE — PROGRESS NOTES
Chief Complaint:   Erectile dysfunction    HPI:   Patient is a 67-year-old male that is presenting with complaints of erectile dysfunction.  Patient states that he is unable to maintain an erection and does not have morning erections.  Unfortunately, patient has uncontrolled diabetes hypertension, obesity and has a history of previous MI. Reports that he has not used any treatment for erectile dysfunction,in the past.  Allergies:  Protamine    Medications:  has a current medication list which includes the following prescription(s): allopurinol, aspirin, atorvastatin, azathioprine, baclofen, blood sugar diagnostic, blood-glucose meter, eliquis, furosemide, gabapentin, insulin aspart u-100, ipratropium, jardiance, micro thin lancets, pantoprazole, pen needle, diabetic, ranolazine, entresto, ozempic, sertraline, tamsulosin, spiriva with handihaler, trazodone, vios aerosol delivery system, papaverine-phentolamin-alprost, and [DISCONTINUED] diclofenac sodium.    Review of Systems:  General: No fever, chills, fatigability, or weight loss.  Skin: No rashes, itching, or changes in color or texture of skin.  Chest: Denies VARGAS, cyanosis, wheezing, cough, and sputum production.  Abdomen: Appetite fine. No weight loss. Denies diarrhea, abdominal pain, hematemesis, or blood in stool.  Musculoskeletal: No joint stiffness or swelling. Denies back pain.  : As above.  All other review of systems negative.    PMH:   has a past medical history of Arthritis, Asthma, Atrial fibrillation, Atrial fibrillation with RVR (8/7/2019), CHF (congestive heart failure), Chronic obstructive pulmonary disease (8/5/2020), Depression, Dermatomyositis, Diabetes mellitus, type 2 (Diagnosed in 2000), Elevated PSA, Follow up (7/30/2020), Hypertension, Myocardial infarction, and Sleep apnea.    PSH:   has a past surgical history that includes Cholecystectomy; Ablation of arrhythmogenic focus for atrial fibrillation (N/A, 2/27/2020); Selective injection  of anesthetic agent around lumbar spinal nerve root by transforaminal approach (Left, 6/11/2020); Toe amputation (Left, 6/29/2020); Closure of wound (Left, 7/1/2020); Injection of anesthetic agent into sacroiliac joint (Left, 3/24/2021); Reverse total shoulder arthroplasty (Left, 1/10/2022); Esophagogastroduodenoscopy (N/A, 3/4/2022); Colonoscopy (N/A, 3/4/2022); and Intraluminal gastrointestinal tract imaging via capsule (N/A, 3/22/2022).    FamHx: family history includes Cancer in his maternal grandfather; Cataracts in his father, mother, and sister; Diabetes in his maternal aunt, mother, and sister; Glaucoma in his maternal aunt, mother, and sister; No Known Problems in his brother, daughter, and son; Stroke in his father and sister.    SocHx:  reports that he has never smoked. He has never used smokeless tobacco. He reports current alcohol use. He reports that he does not currently use drugs after having used the following drugs: Cocaine.      Physical Exam:  Vitals:    12/13/22 0929   BP: (!) 155/80   Pulse: 86     General:  Morbidly obese male, A&Ox3, no apparent distress, no deformities  Neck: No masses, normal thyroid  Lungs: normal inspiration, no use of accessory muscles  Heart: normal pulse, no arrhythmias  Abdomen: Soft, NT, ND, no masses, no hernias, no hepatosplenomegaly  Lymphatic: Neck and groin nodes negative  Skin: The skin is warm and dry. No jaundice.    Impression/Plan:   ED - I reviewed the etiology and management of ED.  Management options include oral medications, vacuum erectile devices, MUSE urethral suppositories, intracavernosal injections, and surgical placement of a penile prosthesis.  I reviewed the risks and benefits of each.  For medications, I noted that they are safe and effective, but noted that side effects include flushing of the face, headaches, color vision change, blurry vision, and congestion/runny nose. They should not be used by anyone currently taking nitrates for chest  pain, and I reviewed the patient's current medications in the chart and verbally with the patient and he is not. For vacuum erectile devices, I noted that they also are safe but that they require the use of a restrictive ring at the base of the penis in order to prevent a detumescence, which can be uncomfortable for some patients. They also have the added benefits of being able to be combined with medical therapy for added effect.  For MUSE, I noted that these are reasonably effective, but that patients usually experience urethral burning, which can also be experienced by the partner, often requiring the use of condoms.  For intracavernosal injections I explained that this is usually the most efficacious medication to achieve a natural erection, but requires injecting the penis, which is not a suitable option for everybody.  For surgical options I reviewed a that there is the surgical risks which include pain, bleeding, and infection.  I noted that an infection of the device would require it to be removed, which can make replacement at a later date very difficult.  I explained that once a prosthesis is implanted the patient will never achieve a natural erection ever again.   Patient was prescribed TriMix and will return to clinic for TriMix teaching.

## 2022-12-19 ENCOUNTER — TELEPHONE (OUTPATIENT)
Dept: DIABETES | Facility: CLINIC | Age: 67
End: 2022-12-19
Payer: MEDICARE

## 2022-12-19 NOTE — TELEPHONE ENCOUNTER
Pt reports he needs to have his dexcom sensor placed on his abdomen. Scheduled NV for pt.     ----- Message from Nina Rendon sent at 12/19/2022  2:23 PM CST -----  Contact: Crow Wright is calling in regards to getting an earlier appt.Please call back at [phone]      Thanks  FOUZIA

## 2022-12-19 NOTE — TELEPHONE ENCOUNTER
----- Message from Angélica Buck sent at 12/19/2022  7:49 AM CST -----  Contact: Crow  Type:  Sooner Apoointment Request    Caller is requesting a sooner appointment. Caller will not accept being placed on the waitlist and is requesting a message be sent to doctor.  Name of Caller:Crow  When is the first available appointment?unknown  Symptoms:Dexcom installation on phone  Would the patient rather a call back or a response via [facility]? call  Best Call Back Number: [phone]  Additional Information: Patient request to schedule an appointment sooner than next aviable.  Thank you,  GH

## 2022-12-21 ENCOUNTER — OUTPATIENT CASE MANAGEMENT (OUTPATIENT)
Dept: ADMINISTRATIVE | Facility: OTHER | Age: 67
End: 2022-12-21
Payer: MEDICARE

## 2022-12-22 NOTE — PROGRESS NOTES
Outpatient Care Management  Initial Patient Assessment    Patient: Crow Green  MRN: [id number]  Date of Service: 12/21/2022  Completed by: Divina Bush RN  Referral Date: 12/07/2022  Program: High Risk  Status: Ongoing  Effective Dates: 12/21/2022 - present  Responsible Staff: Divina Bush RN        Reason for Visit   Patient presents with    OPCM Chart Review    OPCM Enrollment Call    Initial Assessment    Nursing Assessment    OPCM Welcome Letter    Plan Of Care     12-21-22       Brief Summary and Next Steps:  Crow Green was referred by [facility] for Schizoaffextive disorder, Major Depressive Disorder. Patient qualifies for program based on risk score of 73.3%.   Active problem list, medical, surgical and social history reviewed. Active comorbidities include DMII, MDD, Hx falls . Areas of need identified by patient include Education on falls, DMII, Medication compliance, need for Home Health SN, PT, OT. There is already a  on this case and patient has been referred to psychiatry as well as Behavioral Health program by PCP. Complex care plan created with patient/caregiver input. By next encounter, patient agrees to read all material mailed to him, meet with Diabetic Educator on Friday to  Dexcom for closer diabetic monitoring.

## 2022-12-23 ENCOUNTER — CLINICAL SUPPORT (OUTPATIENT)
Dept: DIABETES | Facility: CLINIC | Age: 67
End: 2022-12-23
Payer: MEDICARE

## 2022-12-23 NOTE — PROGRESS NOTES
Placed sensor w/ transmitter on left side of pt's abdomen, but pt phone is not compatible w/ clarity G6 brittnee so had to remove sensor. Ordering dexcom G6  via parachute to be delivered to pt's home.

## 2022-12-26 PROBLEM — N17.9 AKI (ACUTE KIDNEY INJURY): Status: RESOLVED | Noted: 2019-08-07 | Resolved: 2022-12-26

## 2022-12-28 ENCOUNTER — OUTPATIENT CASE MANAGEMENT (OUTPATIENT)
Dept: ADMINISTRATIVE | Facility: OTHER | Age: 67
End: 2022-12-28
Payer: MEDICARE

## 2022-12-29 ENCOUNTER — OUTPATIENT CASE MANAGEMENT (OUTPATIENT)
Dept: ADMINISTRATIVE | Facility: OTHER | Age: 67
End: 2022-12-29
Payer: MEDICARE

## 2022-12-29 PROCEDURE — G0180 PR HOME HEALTH MD CERTIFICATION: ICD-10-PCS | Mod: ,,, | Performed by: FAMILY MEDICINE

## 2022-12-29 PROCEDURE — G0180 MD CERTIFICATION HHA PATIENT: HCPCS | Mod: ,,, | Performed by: FAMILY MEDICINE

## 2022-12-30 ENCOUNTER — TELEPHONE (OUTPATIENT)
Dept: DIABETES | Facility: CLINIC | Age: 67
End: 2022-12-30
Payer: MEDICARE

## 2022-12-30 NOTE — TELEPHONE ENCOUNTER
Left vm advising pt to call the customer service number on the back of his dexcom box to inform them that he needs to have a replacement  sent. Asked pt to keep us updated on this.     ----- Message from Tim Etienne sent at 12/30/2022  4:00 PM CST -----  Contact: Crow Breen is calling to advise office that he has not received his dexcom replacement . Please callback at [phone].         Thanks,  AK

## 2023-01-04 ENCOUNTER — LAB VISIT (OUTPATIENT)
Dept: LAB | Facility: HOSPITAL | Age: 68
End: 2023-01-04
Attending: PHYSICIAN ASSISTANT
Payer: MEDICARE

## 2023-01-04 DIAGNOSIS — E11.65 UNCONTROLLED TYPE 2 DIABETES MELLITUS WITH HYPERGLYCEMIA: ICD-10-CM

## 2023-01-04 LAB
ESTIMATED AVG GLUCOSE: 134 MG/DL (ref 68–131)
HBA1C MFR BLD: 6.3 % (ref 4–5.6)

## 2023-01-04 PROCEDURE — 83036 HEMOGLOBIN GLYCOSYLATED A1C: CPT | Mod: HCNC | Performed by: PHYSICIAN ASSISTANT

## 2023-01-04 PROCEDURE — 36415 COLL VENOUS BLD VENIPUNCTURE: CPT | Mod: HCNC | Performed by: PHYSICIAN ASSISTANT

## 2023-01-05 ENCOUNTER — OUTPATIENT CASE MANAGEMENT (OUTPATIENT)
Dept: ADMINISTRATIVE | Facility: OTHER | Age: 68
End: 2023-01-05
Payer: MEDICARE

## 2023-01-05 NOTE — PROGRESS NOTES
Outpatient Care Management  Plan of Care Follow Up Visit    Patient: Crow Green  MRN: [id number]  Date of Service: 01/05/2023  Completed by: Divina Bush RN  Referral Date: 12/07/2022    Reason for Visit   Patient presents with    OPCM Chart Review    Case Closure    OPCM RN Third Follow-Up Attempt     1-5-23  UTR x 3 attempts and no response to UTR letter mailed 1 week ago.

## 2023-01-09 PROBLEM — N18.30 ACUTE RENAL FAILURE SUPERIMPOSED ON STAGE 3 CHRONIC KIDNEY DISEASE: Status: RESOLVED | Noted: 2022-06-28 | Resolved: 2023-01-09

## 2023-01-09 PROBLEM — N17.9 ACUTE RENAL FAILURE SUPERIMPOSED ON STAGE 3 CHRONIC KIDNEY DISEASE: Status: RESOLVED | Noted: 2022-06-28 | Resolved: 2023-01-09

## 2023-01-11 ENCOUNTER — OFFICE VISIT (OUTPATIENT)
Dept: PODIATRY | Facility: CLINIC | Age: 68
End: 2023-01-11
Payer: MEDICARE

## 2023-01-11 VITALS — BODY MASS INDEX: 31.14 KG/M2 | HEIGHT: 65 IN | WEIGHT: 186.94 LBS

## 2023-01-11 DIAGNOSIS — E11.49 TYPE II DIABETES MELLITUS WITH NEUROLOGICAL MANIFESTATIONS: Primary | ICD-10-CM

## 2023-01-11 DIAGNOSIS — L84 PRE-ULCERATIVE CALLUSES: ICD-10-CM

## 2023-01-11 DIAGNOSIS — B35.1 DERMATOPHYTOSIS OF NAIL: ICD-10-CM

## 2023-01-11 PROCEDURE — 3044F PR MOST RECENT HEMOGLOBIN A1C LEVEL <7.0%: ICD-10-PCS | Mod: HCNC,CPTII,S$GLB, | Performed by: PODIATRIST

## 2023-01-11 PROCEDURE — 11721 PR DEBRIDEMENT OF NAILS, 6 OR MORE: ICD-10-PCS | Mod: 59,Q7,HCNC,S$GLB | Performed by: PODIATRIST

## 2023-01-11 PROCEDURE — 3008F BODY MASS INDEX DOCD: CPT | Mod: HCNC,CPTII,S$GLB, | Performed by: PODIATRIST

## 2023-01-11 PROCEDURE — 1159F MED LIST DOCD IN RCRD: CPT | Mod: HCNC,CPTII,S$GLB, | Performed by: PODIATRIST

## 2023-01-11 PROCEDURE — 4010F PR ACE/ARB THEARPY RXD/TAKEN: ICD-10-PCS | Mod: HCNC,CPTII,S$GLB, | Performed by: PODIATRIST

## 2023-01-11 PROCEDURE — 1125F AMNT PAIN NOTED PAIN PRSNT: CPT | Mod: HCNC,CPTII,S$GLB, | Performed by: PODIATRIST

## 2023-01-11 PROCEDURE — 3044F HG A1C LEVEL LT 7.0%: CPT | Mod: HCNC,CPTII,S$GLB, | Performed by: PODIATRIST

## 2023-01-11 PROCEDURE — 11055 PR TRIM HYPERKERATOTIC SKIN LESION, ONE: ICD-10-PCS | Mod: Q7,HCNC,LT,S$GLB | Performed by: PODIATRIST

## 2023-01-11 PROCEDURE — 99999 PR PBB SHADOW E&M-EST. PATIENT-LVL IV: CPT | Mod: PBBFAC,HCNC,, | Performed by: PODIATRIST

## 2023-01-11 PROCEDURE — 3288F PR FALLS RISK ASSESSMENT DOCUMENTED: ICD-10-PCS | Mod: HCNC,CPTII,S$GLB, | Performed by: PODIATRIST

## 2023-01-11 PROCEDURE — 1101F PT FALLS ASSESS-DOCD LE1/YR: CPT | Mod: HCNC,CPTII,S$GLB, | Performed by: PODIATRIST

## 2023-01-11 PROCEDURE — 11055 PARING/CUTG B9 HYPRKER LES 1: CPT | Mod: Q7,HCNC,LT,S$GLB | Performed by: PODIATRIST

## 2023-01-11 PROCEDURE — 1125F PR PAIN SEVERITY QUANTIFIED, PAIN PRESENT: ICD-10-PCS | Mod: HCNC,CPTII,S$GLB, | Performed by: PODIATRIST

## 2023-01-11 PROCEDURE — 1160F RVW MEDS BY RX/DR IN RCRD: CPT | Mod: HCNC,CPTII,S$GLB, | Performed by: PODIATRIST

## 2023-01-11 PROCEDURE — 3288F FALL RISK ASSESSMENT DOCD: CPT | Mod: HCNC,CPTII,S$GLB, | Performed by: PODIATRIST

## 2023-01-11 PROCEDURE — 1160F PR REVIEW ALL MEDS BY PRESCRIBER/CLIN PHARMACIST DOCUMENTED: ICD-10-PCS | Mod: HCNC,CPTII,S$GLB, | Performed by: PODIATRIST

## 2023-01-11 PROCEDURE — 3008F PR BODY MASS INDEX (BMI) DOCUMENTED: ICD-10-PCS | Mod: HCNC,CPTII,S$GLB, | Performed by: PODIATRIST

## 2023-01-11 PROCEDURE — 11721 DEBRIDE NAIL 6 OR MORE: CPT | Mod: 59,Q7,HCNC,S$GLB | Performed by: PODIATRIST

## 2023-01-11 PROCEDURE — 99499 NO LOS: ICD-10-PCS | Mod: HCNC,S$GLB,, | Performed by: PODIATRIST

## 2023-01-11 PROCEDURE — 99999 PR PBB SHADOW E&M-EST. PATIENT-LVL IV: ICD-10-PCS | Mod: PBBFAC,HCNC,, | Performed by: PODIATRIST

## 2023-01-11 PROCEDURE — 99499 UNLISTED E&M SERVICE: CPT | Mod: HCNC,S$GLB,, | Performed by: PODIATRIST

## 2023-01-11 PROCEDURE — 1159F PR MEDICATION LIST DOCUMENTED IN MEDICAL RECORD: ICD-10-PCS | Mod: HCNC,CPTII,S$GLB, | Performed by: PODIATRIST

## 2023-01-11 PROCEDURE — 1101F PR PT FALLS ASSESS DOC 0-1 FALLS W/OUT INJ PAST YR: ICD-10-PCS | Mod: HCNC,CPTII,S$GLB, | Performed by: PODIATRIST

## 2023-01-11 PROCEDURE — 4010F ACE/ARB THERAPY RXD/TAKEN: CPT | Mod: HCNC,CPTII,S$GLB, | Performed by: PODIATRIST

## 2023-01-12 ENCOUNTER — OFFICE VISIT (OUTPATIENT)
Dept: DIABETES | Facility: CLINIC | Age: 68
End: 2023-01-12
Payer: MEDICARE

## 2023-01-12 VITALS
SYSTOLIC BLOOD PRESSURE: 150 MMHG | DIASTOLIC BLOOD PRESSURE: 84 MMHG | WEIGHT: 193.13 LBS | HEART RATE: 76 BPM | BODY MASS INDEX: 32.14 KG/M2

## 2023-01-12 DIAGNOSIS — E78.5 HYPERLIPIDEMIA ASSOCIATED WITH TYPE 2 DIABETES MELLITUS: ICD-10-CM

## 2023-01-12 DIAGNOSIS — E11.3312 MODERATE NONPROLIFERATIVE DIABETIC RETINOPATHY OF LEFT EYE WITH MACULAR EDEMA ASSOCIATED WITH TYPE 2 DIABETES MELLITUS: ICD-10-CM

## 2023-01-12 DIAGNOSIS — E11.65 UNCONTROLLED TYPE 2 DIABETES MELLITUS WITH HYPERGLYCEMIA: Primary | ICD-10-CM

## 2023-01-12 DIAGNOSIS — N52.01 ERECTILE DYSFUNCTION DUE TO ARTERIAL INSUFFICIENCY: ICD-10-CM

## 2023-01-12 DIAGNOSIS — I25.118 CORONARY ARTERY DISEASE OF NATIVE ARTERY OF NATIVE HEART WITH STABLE ANGINA PECTORIS: ICD-10-CM

## 2023-01-12 DIAGNOSIS — E11.42 DIABETIC POLYNEUROPATHY ASSOCIATED WITH TYPE 2 DIABETES MELLITUS: ICD-10-CM

## 2023-01-12 DIAGNOSIS — E11.69 HYPERLIPIDEMIA ASSOCIATED WITH TYPE 2 DIABETES MELLITUS: ICD-10-CM

## 2023-01-12 DIAGNOSIS — I15.2 HYPERTENSION ASSOCIATED WITH DIABETES: ICD-10-CM

## 2023-01-12 DIAGNOSIS — I50.32 CHRONIC DIASTOLIC HEART FAILURE: ICD-10-CM

## 2023-01-12 DIAGNOSIS — E66.9 OBESITY (BMI 30.0-34.9): ICD-10-CM

## 2023-01-12 DIAGNOSIS — E21.5 DISORDER OF PARATHYROID GLAND: ICD-10-CM

## 2023-01-12 DIAGNOSIS — E11.59 HYPERTENSION ASSOCIATED WITH DIABETES: ICD-10-CM

## 2023-01-12 LAB — GLUCOSE SERPL-MCNC: 92 MG/DL (ref 70–110)

## 2023-01-12 PROCEDURE — 99999 PR PBB SHADOW E&M-EST. PATIENT-LVL II: ICD-10-PCS | Mod: PBBFAC,HCNC,, | Performed by: PHYSICIAN ASSISTANT

## 2023-01-12 PROCEDURE — 1125F AMNT PAIN NOTED PAIN PRSNT: CPT | Mod: HCNC,CPTII,S$GLB, | Performed by: PHYSICIAN ASSISTANT

## 2023-01-12 PROCEDURE — 4010F ACE/ARB THERAPY RXD/TAKEN: CPT | Mod: HCNC,CPTII,S$GLB, | Performed by: PHYSICIAN ASSISTANT

## 2023-01-12 PROCEDURE — 99499 RISK ADDL DX/OHS AUDIT: ICD-10-PCS | Mod: S$GLB,,, | Performed by: PHYSICIAN ASSISTANT

## 2023-01-12 PROCEDURE — 82962 GLUCOSE BLOOD TEST: CPT | Mod: HCNC,S$GLB,, | Performed by: PHYSICIAN ASSISTANT

## 2023-01-12 PROCEDURE — 1101F PR PT FALLS ASSESS DOC 0-1 FALLS W/OUT INJ PAST YR: ICD-10-PCS | Mod: HCNC,CPTII,S$GLB, | Performed by: PHYSICIAN ASSISTANT

## 2023-01-12 PROCEDURE — 82962 POCT GLUCOSE, HAND-HELD DEVICE: ICD-10-PCS | Mod: HCNC,S$GLB,, | Performed by: PHYSICIAN ASSISTANT

## 2023-01-12 PROCEDURE — 3008F BODY MASS INDEX DOCD: CPT | Mod: HCNC,CPTII,S$GLB, | Performed by: PHYSICIAN ASSISTANT

## 2023-01-12 PROCEDURE — 4010F PR ACE/ARB THEARPY RXD/TAKEN: ICD-10-PCS | Mod: HCNC,CPTII,S$GLB, | Performed by: PHYSICIAN ASSISTANT

## 2023-01-12 PROCEDURE — 1101F PT FALLS ASSESS-DOCD LE1/YR: CPT | Mod: HCNC,CPTII,S$GLB, | Performed by: PHYSICIAN ASSISTANT

## 2023-01-12 PROCEDURE — 99214 OFFICE O/P EST MOD 30 MIN: CPT | Mod: HCNC,S$GLB,, | Performed by: PHYSICIAN ASSISTANT

## 2023-01-12 PROCEDURE — 3077F SYST BP >= 140 MM HG: CPT | Mod: HCNC,CPTII,S$GLB, | Performed by: PHYSICIAN ASSISTANT

## 2023-01-12 PROCEDURE — 1159F MED LIST DOCD IN RCRD: CPT | Mod: HCNC,CPTII,S$GLB, | Performed by: PHYSICIAN ASSISTANT

## 2023-01-12 PROCEDURE — 99499 UNLISTED E&M SERVICE: CPT | Mod: S$GLB,,, | Performed by: PHYSICIAN ASSISTANT

## 2023-01-12 PROCEDURE — 99999 PR PBB SHADOW E&M-EST. PATIENT-LVL II: CPT | Mod: PBBFAC,HCNC,, | Performed by: PHYSICIAN ASSISTANT

## 2023-01-12 PROCEDURE — 3288F FALL RISK ASSESSMENT DOCD: CPT | Mod: HCNC,CPTII,S$GLB, | Performed by: PHYSICIAN ASSISTANT

## 2023-01-12 PROCEDURE — 1125F PR PAIN SEVERITY QUANTIFIED, PAIN PRESENT: ICD-10-PCS | Mod: HCNC,CPTII,S$GLB, | Performed by: PHYSICIAN ASSISTANT

## 2023-01-12 PROCEDURE — 3079F DIAST BP 80-89 MM HG: CPT | Mod: HCNC,CPTII,S$GLB, | Performed by: PHYSICIAN ASSISTANT

## 2023-01-12 PROCEDURE — 99214 PR OFFICE/OUTPT VISIT, EST, LEVL IV, 30-39 MIN: ICD-10-PCS | Mod: HCNC,S$GLB,, | Performed by: PHYSICIAN ASSISTANT

## 2023-01-12 PROCEDURE — 3288F PR FALLS RISK ASSESSMENT DOCUMENTED: ICD-10-PCS | Mod: HCNC,CPTII,S$GLB, | Performed by: PHYSICIAN ASSISTANT

## 2023-01-12 PROCEDURE — 3044F PR MOST RECENT HEMOGLOBIN A1C LEVEL <7.0%: ICD-10-PCS | Mod: HCNC,CPTII,S$GLB, | Performed by: PHYSICIAN ASSISTANT

## 2023-01-12 PROCEDURE — 3079F PR MOST RECENT DIASTOLIC BLOOD PRESSURE 80-89 MM HG: ICD-10-PCS | Mod: HCNC,CPTII,S$GLB, | Performed by: PHYSICIAN ASSISTANT

## 2023-01-12 PROCEDURE — 3044F HG A1C LEVEL LT 7.0%: CPT | Mod: HCNC,CPTII,S$GLB, | Performed by: PHYSICIAN ASSISTANT

## 2023-01-12 PROCEDURE — 1159F PR MEDICATION LIST DOCUMENTED IN MEDICAL RECORD: ICD-10-PCS | Mod: HCNC,CPTII,S$GLB, | Performed by: PHYSICIAN ASSISTANT

## 2023-01-12 PROCEDURE — 3077F PR MOST RECENT SYSTOLIC BLOOD PRESSURE >= 140 MM HG: ICD-10-PCS | Mod: HCNC,CPTII,S$GLB, | Performed by: PHYSICIAN ASSISTANT

## 2023-01-12 PROCEDURE — 3008F PR BODY MASS INDEX (BMI) DOCUMENTED: ICD-10-PCS | Mod: HCNC,CPTII,S$GLB, | Performed by: PHYSICIAN ASSISTANT

## 2023-01-12 NOTE — PROGRESS NOTES
PCP: Cm Polanco MD    Subjective:     Chief Complaint: Diabetes - Established Patient    HISTORY OF PRESENT ILLNESS: 67 y.o.   male presenting for diabetes management visit.   The patient's last visit with me was on 11/22/2022.  Patient has issues with transportation.   Patient has had Type II diabetes since 2000.  Pertinent to decision making is the following comorbidities: HTN, HLD, CAD, CHF, CKD III, Obesity by BMI, ED and Parathryoid disorder and Hypogonadism   Patient has the following Diabetes complications: with diabetic chronic kidney disease, with diabetic polyneuropathy and with diabetic retinopathy; mild non-proliferative; without macular edema  He has attended diabetes education in the past.     Patient's most recent A1c of 6.3% was completed 1 weeks ago.   Patient states since His last A1c His blood glucose levels have been within range throughout the day .   Patient monitors blood glucose 4 times per day and Continuously with personal CGM Dexcom. Currently off Dexcom due to needing to get received from [facility].   Patient blood glucose monitoring device will not be uploaded into [employer] [facility] today.  Patient endorses the following diabetes related symptoms:  None .   Patient is due today for the following diabetes-related health maintenance standards: COVID-19 Vaccine .   He denies recent hospital admissions or emergency room visits.   He voices having hypoglycemia in the am when skipping meal.   Patient's concerns today include glycemic control.   Patient medication regimen is as below.     CURRENT DM MEDICATIONS:   Novolog correction dosing every 3 hours   Jardiance 25 mg daily  Ozempic 2 mg weekly    Novolog Correction Dosing every 3 hours as needed OUTSIDE OF EATING  If  - 250, may take 2 units of Novolog  If  - 300, may take 3 units of Novolog  If  - 350, may take 4 units of Novolog  If  - 400, may take 5 units of Novolog  If +, may take 6 units  of Novolog      Patient has failed the following Diabetes medications:   Bydureon  Metformin - GI  Glimepiride      Labs Reviewed.       Lab Results   Component Value Date    CPEPTIDE 4.37 07/20/2017     Lab Results   Component Value Date    GLUTAMICACID 0.01 07/20/2017          //   , There is no height or weight on file to calculate BMI.  His blood sugar in clinic today is:        Review of Systems   Constitutional:  Negative for activity change, appetite change, chills and fever.   HENT:  Negative for dental problem, mouth sores, nosebleeds, sore throat and trouble swallowing.    Eyes:  Negative for pain and discharge.   Respiratory:  Negative for shortness of breath, wheezing and stridor.    Cardiovascular:  Negative for chest pain, palpitations and leg swelling.   Gastrointestinal:  Negative for abdominal pain, diarrhea, nausea and vomiting.   Endocrine: Negative for polydipsia, polyphagia and polyuria.   Genitourinary:  Negative for dysuria, frequency and urgency.   Musculoskeletal:  Negative for joint swelling and myalgias.   Skin:  Negative for rash and wound.   Neurological:  Negative for dizziness, syncope, weakness and headaches.   Psychiatric/Behavioral:  Negative for behavioral problems and dysphoric mood.        Diabetes Management Status  Statin: Taking  ACE/ARB: Taking    Screening or Prevention Patient's value Goal Complete/Controlled?   HgA1C Testing and Control   Lab Results   Component Value Date    HGBA1C 6.3 (H) 01/04/2023      Annually/Less than 8% Yes   Lipid profile : 06/10/2022 Annually Yes   LDL control Lab Results   Component Value Date    LDLCALC 88.8 06/10/2022    Annually/Less than 100 mg/dl  Yes   Nephropathy screening Lab Results   Component Value Date    MICALBCREAT 1310.7 (H) 09/28/2022     Lab Results   Component Value Date    PROTEINUA 2+ (A) 07/30/2022    Annually No   Blood pressure BP Readings from Last 1 Encounters:   12/13/22 (!) 155/80    Less than 140/90 Yes   Dilated  retinal exam : 10/18/2022 Annually Yes    Foot exam   : 06/09/2022 Annually Yes     Social History     Socioeconomic History    Marital status: Legally     Number of children: 5   Occupational History    Occupation: disabled    Occupation: PT at [employer]    Tobacco Use    Smoking status: Never    Smokeless tobacco: Never   Substance and Sexual Activity    Alcohol use: Yes     Comment: rare, maybe holidays    Drug use: Not Currently     Types: Cocaine    Sexual activity: Not Currently     Partners: Female   Social History Narrative    Utilizing [employer] transportation which has been unreliable.      Social Determinants of Health     Financial Resource Strain: Low Risk     Difficulty of Paying Living Expenses: Not very hard   Food Insecurity: No Food Insecurity    Worried About Running Out of Food in the Last Year: Never true    Ran Out of Food in the Last Year: Never true   Transportation Needs: Unmet Transportation Needs    Lack of Transportation (Medical): Yes    Lack of Transportation (Non-Medical): Yes   Physical Activity: Inactive    Days of Exercise per Week: 0 days    Minutes of Exercise per Session: 0 min   Stress: Stress Concern Present    Feeling of Stress : Rather much   Social Connections: Moderately Isolated    Frequency of Communication with Friends and Family: Once a week    Frequency of Social Gatherings with Friends and Family: Once a week    Attends Voodoo Services: More than 4 times per year    Active Member of Clubs or Organizations: No    Attends Club or Organization Meetings: More than 4 times per year    Marital Status:    Housing Stability: Low Risk     Unable to Pay for Housing in the Last Year: No    Number of Places Lived in the Last Year: 1    Unstable Housing in the Last Year: No     Past Medical History:   Diagnosis Date    Arthritis     Asthma     Atrial fibrillation     Atrial fibrillation with RVR 8/7/2019    CHF (congestive heart failure)      Chronic obstructive pulmonary disease 8/5/2020    Depression     Dermatomyositis     Diabetes mellitus, type 2 Diagnosed in 2000    Elevated PSA     Follow up 7/30/2020    Hypertension     Myocardial infarction     2017    Sleep apnea        Objective:        Physical Exam  Neurological:      Mental Status: He is alert and oriented to person, place, and time. Mental status is at baseline.   Psychiatric:         Mood and Affect: Mood normal.         Behavior: Behavior normal.         Thought Content: Thought content normal.         Judgment: Judgment normal.         Assessment / Plan:     Uncontrolled type 2 diabetes mellitus with hyperglycemia  -     POCT Glucose, Hand-Held Device  -     Hemoglobin A1C; Standing    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 associated with type 2 diabetes mellitus    Diabetic polyneuropathy associated with type 2 diabetes mellitus    Chronic diastolic heart failure    Coronary artery disease of native artery of native heart with stable angina pectoris    Hypertension associated with diabetes    Hyperlipidemia associated with type 2 diabetes mellitus    Disorder of parathyroid gland    Obesity (BMI 30.0-34.9)        Additional Plan Details:    - POCT Glucose  - Encouraged continuation of lifestyle changes including regular exercise and limiting carbohydrates to 30-45 grams per meal threes times daily and 15 grams per snack with a limit of two daily.   - Encouraged continued monitoring of blood glucose with maintenance of 4 times daily and Continuously with personal CGM Dexcom.  order to [facility] - provided number for pt to call.    - Current DM Medication Regimen: Continue Novolog correction dosing every 3 hours as below.  Continue Ozempic 2 mg weekly. Continue Jardiance 25 mg daily.   - Health Maintenance standards addressed today: COVID - 19 Vaccine - patient will schedule outside of [facility]   - Nursing Visit: Patient is below goal range for  nursing visit for age group and  Will need blood sugar log review on Monday .   - Follow up in 6 months with A1c prior.    Novolog Correction Dosing every 3 hours as needed OUTSIDE OF EATING  If  - 250, may take 2 units of Novolog  If  - 300, may take 4 units of Novolog  If  - 350, may take 6 units of Novolog  If  - 400, may take 8 units of Novolog  If +, may take 10 units of Novolog - MAX Blakeney McKnight, PA-C [facility]    A total of 30 minutes was spent in face to face time, of which over 50 % was spent in counseling patient on disease process, complications, treatment, and side effects of medications.

## 2023-01-18 ENCOUNTER — OFFICE VISIT (OUTPATIENT)
Dept: OPHTHALMOLOGY | Facility: CLINIC | Age: 68
End: 2023-01-18
Payer: MEDICARE

## 2023-01-18 DIAGNOSIS — Z79.4 TYPE 2 DIABETES MELLITUS WITH BOTH EYES AFFECTED BY MILD NONPROLIFERATIVE RETINOPATHY WITHOUT MACULAR EDEMA, WITH LONG-TERM CURRENT USE OF INSULIN: ICD-10-CM

## 2023-01-18 DIAGNOSIS — E11.3293 TYPE 2 DIABETES MELLITUS WITH BOTH EYES AFFECTED BY MILD NONPROLIFERATIVE RETINOPATHY WITHOUT MACULAR EDEMA, WITH LONG-TERM CURRENT USE OF INSULIN: ICD-10-CM

## 2023-01-18 DIAGNOSIS — H40.1132 PRIMARY OPEN ANGLE GLAUCOMA (POAG) OF BOTH EYES, MODERATE STAGE: Primary | ICD-10-CM

## 2023-01-18 DIAGNOSIS — E11.36 DIABETIC CATARACT: ICD-10-CM

## 2023-01-18 DIAGNOSIS — H02.59 FLOPPY EYELID SYNDROME OF BOTH EYES: ICD-10-CM

## 2023-01-18 PROCEDURE — 99214 PR OFFICE/OUTPT VISIT, EST, LEVL IV, 30-39 MIN: ICD-10-PCS | Mod: HCNC,S$GLB,, | Performed by: OPHTHALMOLOGY

## 2023-01-18 PROCEDURE — 1159F PR MEDICATION LIST DOCUMENTED IN MEDICAL RECORD: ICD-10-PCS | Mod: HCNC,CPTII,S$GLB, | Performed by: OPHTHALMOLOGY

## 2023-01-18 PROCEDURE — 99214 OFFICE O/P EST MOD 30 MIN: CPT | Mod: HCNC,S$GLB,, | Performed by: OPHTHALMOLOGY

## 2023-01-18 PROCEDURE — 92083 EXTENDED VISUAL FIELD XM: CPT | Mod: HCNC,S$GLB,, | Performed by: OPHTHALMOLOGY

## 2023-01-18 PROCEDURE — 99999 PR PBB SHADOW E&M-EST. PATIENT-LVL III: CPT | Mod: PBBFAC,HCNC,, | Performed by: OPHTHALMOLOGY

## 2023-01-18 PROCEDURE — 92133 CPTRZD OPH DX IMG PST SGM ON: CPT | Mod: HCNC,S$GLB,, | Performed by: OPHTHALMOLOGY

## 2023-01-18 PROCEDURE — 4010F PR ACE/ARB THEARPY RXD/TAKEN: ICD-10-PCS | Mod: HCNC,CPTII,S$GLB, | Performed by: OPHTHALMOLOGY

## 2023-01-18 PROCEDURE — 1159F MED LIST DOCD IN RCRD: CPT | Mod: HCNC,CPTII,S$GLB, | Performed by: OPHTHALMOLOGY

## 2023-01-18 PROCEDURE — 3044F HG A1C LEVEL LT 7.0%: CPT | Mod: HCNC,CPTII,S$GLB, | Performed by: OPHTHALMOLOGY

## 2023-01-18 PROCEDURE — 99999 PR PBB SHADOW E&M-EST. PATIENT-LVL III: ICD-10-PCS | Mod: PBBFAC,HCNC,, | Performed by: OPHTHALMOLOGY

## 2023-01-18 PROCEDURE — 1160F PR REVIEW ALL MEDS BY PRESCRIBER/CLIN PHARMACIST DOCUMENTED: ICD-10-PCS | Mod: HCNC,CPTII,S$GLB, | Performed by: OPHTHALMOLOGY

## 2023-01-18 PROCEDURE — 3044F PR MOST RECENT HEMOGLOBIN A1C LEVEL <7.0%: ICD-10-PCS | Mod: HCNC,CPTII,S$GLB, | Performed by: OPHTHALMOLOGY

## 2023-01-18 PROCEDURE — 92083 HUMPHREY VISUAL FIELD - OU - BOTH EYES: ICD-10-PCS | Mod: HCNC,S$GLB,, | Performed by: OPHTHALMOLOGY

## 2023-01-18 PROCEDURE — 1160F RVW MEDS BY RX/DR IN RCRD: CPT | Mod: HCNC,CPTII,S$GLB, | Performed by: OPHTHALMOLOGY

## 2023-01-18 PROCEDURE — 4010F ACE/ARB THERAPY RXD/TAKEN: CPT | Mod: HCNC,CPTII,S$GLB, | Performed by: OPHTHALMOLOGY

## 2023-01-18 PROCEDURE — 92133 POSTERIOR SEGMENT OCT OPTIC NERVE(OCULAR COHERENCE TOMOGRAPHY) - OU - BOTH EYES: ICD-10-PCS | Mod: HCNC,S$GLB,, | Performed by: OPHTHALMOLOGY

## 2023-01-18 RX ORDER — BRINZOLAMIDE/BRIMONIDINE TARTRATE 10; 2 MG/ML; MG/ML
1 SUSPENSION/ DROPS OPHTHALMIC 3 TIMES DAILY
Qty: 8 ML | Refills: 6 | Status: SHIPPED | OUTPATIENT
Start: 2023-01-18

## 2023-01-18 NOTE — PROGRESS NOTES
HPI     Glaucoma Suspect            Comments: Pt reports for glaucoma eval w/ HVF and GOCT. Pt states eyes   hurt OU, feels sore. Va blurry. Slight photophobia OS. 100% compliant with   gtts.           Comments    1. DM with BDR and ME   2. Cataracts OU   3. COAG OU    OU- Latanoprost            Last edited by Gio Hauser on 1/18/2023 10:38 AM.            Assessment /Plan     For exam results, see Encounter Report.      ICD-10-CM ICD-9-CM    1. Primary open angle glaucoma (POAG) of both eyes, moderate stage  H40.1132 365.11 Ch Visual Field - OU - Extended - Both Eyes     365.72 Posterior Segment OCT Optic Nerve- Both eyes  And gonio   Done today       Needs IOP lower add 2nd medication     H/o Asthma   Add Simbrinza BID IOU       2. Type 2 diabetes mellitus with both eyes affected by mild nonproliferative retinopathy without macular edema, with long-term current use of insulin  E11.3293 250.50 Diabetes with no diabetic retinopathy on dilated exam.   Reviewed diabetic eye precautions including excellent blood sugar control, and importance of regular follow up.         Z79.4 362.04      V58.67       3. Diabetic cataract  E11.36 250.50 Early, follow at this time      366.41       4. Floppy eyelid syndrome of both eyes  H02.59 374.89           Add Simbrinza BID OU   Continue latanoprost QHS OU   RETURN TO CLINIC 6-8 week IOP

## 2023-01-20 ENCOUNTER — TELEPHONE (OUTPATIENT)
Dept: PHARMACY | Facility: CLINIC | Age: 68
End: 2023-01-20
Payer: MEDICARE

## 2023-01-25 ENCOUNTER — CLINICAL SUPPORT (OUTPATIENT)
Dept: PULMONOLOGY | Facility: CLINIC | Age: 68
End: 2023-01-25
Payer: MEDICARE

## 2023-01-25 ENCOUNTER — OFFICE VISIT (OUTPATIENT)
Dept: PULMONOLOGY | Facility: CLINIC | Age: 68
End: 2023-01-25
Payer: MEDICARE

## 2023-01-25 VITALS
HEIGHT: 65 IN | OXYGEN SATURATION: 97 % | WEIGHT: 196 LBS | HEART RATE: 73 BPM | WEIGHT: 196 LBS | DIASTOLIC BLOOD PRESSURE: 71 MMHG | SYSTOLIC BLOOD PRESSURE: 131 MMHG | BODY MASS INDEX: 32.65 KG/M2 | RESPIRATION RATE: 16 BRPM | HEIGHT: 65 IN | BODY MASS INDEX: 32.65 KG/M2

## 2023-01-25 DIAGNOSIS — J45.909 ASTHMA, UNSPECIFIED ASTHMA SEVERITY, UNSPECIFIED WHETHER COMPLICATED, UNSPECIFIED WHETHER PERSISTENT: ICD-10-CM

## 2023-01-25 DIAGNOSIS — R76.8 ELEVATED IGE LEVEL: Primary | ICD-10-CM

## 2023-01-25 DIAGNOSIS — J45.998 UNCONTROLLED PERSISTENT ASTHMA: ICD-10-CM

## 2023-01-25 DIAGNOSIS — Z82.5 FAMILY HISTORY OF ASTHMA: ICD-10-CM

## 2023-01-25 PROCEDURE — 1101F PR PT FALLS ASSESS DOC 0-1 FALLS W/OUT INJ PAST YR: ICD-10-PCS | Mod: HCNC,CPTII,S$GLB, | Performed by: INTERNAL MEDICINE

## 2023-01-25 PROCEDURE — 94729 PR C02/MEMBANE DIFFUSE CAPACITY: ICD-10-PCS | Mod: HCNC,S$GLB,, | Performed by: INTERNAL MEDICINE

## 2023-01-25 PROCEDURE — 3044F PR MOST RECENT HEMOGLOBIN A1C LEVEL <7.0%: ICD-10-PCS | Mod: HCNC,CPTII,S$GLB, | Performed by: INTERNAL MEDICINE

## 2023-01-25 PROCEDURE — 3288F PR FALLS RISK ASSESSMENT DOCUMENTED: ICD-10-PCS | Mod: HCNC,CPTII,S$GLB, | Performed by: INTERNAL MEDICINE

## 2023-01-25 PROCEDURE — 99499 UNLISTED E&M SERVICE: CPT | Mod: S$GLB,,, | Performed by: INTERNAL MEDICINE

## 2023-01-25 PROCEDURE — 99999 PR PBB SHADOW E&M-EST. PATIENT-LVL I: ICD-10-PCS | Mod: PBBFAC,HCNC,,

## 2023-01-25 PROCEDURE — 94726 PULM FUNCT TST PLETHYSMOGRAP: ICD-10-PCS | Mod: HCNC,S$GLB,, | Performed by: INTERNAL MEDICINE

## 2023-01-25 PROCEDURE — 99214 PR OFFICE/OUTPT VISIT, EST, LEVL IV, 30-39 MIN: ICD-10-PCS | Mod: HCNC,25,S$GLB, | Performed by: INTERNAL MEDICINE

## 2023-01-25 PROCEDURE — 99999 PR PBB SHADOW E&M-EST. PATIENT-LVL III: CPT | Mod: PBBFAC,HCNC,, | Performed by: INTERNAL MEDICINE

## 2023-01-25 PROCEDURE — 3044F HG A1C LEVEL LT 7.0%: CPT | Mod: HCNC,CPTII,S$GLB, | Performed by: INTERNAL MEDICINE

## 2023-01-25 PROCEDURE — 94726 PLETHYSMOGRAPHY LUNG VOLUMES: CPT | Mod: HCNC,S$GLB,, | Performed by: INTERNAL MEDICINE

## 2023-01-25 PROCEDURE — 99214 OFFICE O/P EST MOD 30 MIN: CPT | Mod: HCNC,25,S$GLB, | Performed by: INTERNAL MEDICINE

## 2023-01-25 PROCEDURE — 94618 PULMONARY STRESS TESTING: ICD-10-PCS | Mod: HCNC,S$GLB,, | Performed by: INTERNAL MEDICINE

## 2023-01-25 PROCEDURE — 94060 EVALUATION OF WHEEZING: CPT | Mod: HCNC,59,S$GLB, | Performed by: INTERNAL MEDICINE

## 2023-01-25 PROCEDURE — 1160F PR REVIEW ALL MEDS BY PRESCRIBER/CLIN PHARMACIST DOCUMENTED: ICD-10-PCS | Mod: HCNC,CPTII,S$GLB, | Performed by: INTERNAL MEDICINE

## 2023-01-25 PROCEDURE — 3075F PR MOST RECENT SYSTOLIC BLOOD PRESS GE 130-139MM HG: ICD-10-PCS | Mod: HCNC,CPTII,S$GLB, | Performed by: INTERNAL MEDICINE

## 2023-01-25 PROCEDURE — 4010F PR ACE/ARB THEARPY RXD/TAKEN: ICD-10-PCS | Mod: HCNC,CPTII,S$GLB, | Performed by: INTERNAL MEDICINE

## 2023-01-25 PROCEDURE — 3078F PR MOST RECENT DIASTOLIC BLOOD PRESSURE < 80 MM HG: ICD-10-PCS | Mod: HCNC,CPTII,S$GLB, | Performed by: INTERNAL MEDICINE

## 2023-01-25 PROCEDURE — 94729 DIFFUSING CAPACITY: CPT | Mod: HCNC,S$GLB,, | Performed by: INTERNAL MEDICINE

## 2023-01-25 PROCEDURE — 94060 PR EVAL OF BRONCHOSPASM: ICD-10-PCS | Mod: HCNC,59,S$GLB, | Performed by: INTERNAL MEDICINE

## 2023-01-25 PROCEDURE — 99999 PR PBB SHADOW E&M-EST. PATIENT-LVL I: CPT | Mod: PBBFAC,HCNC,,

## 2023-01-25 PROCEDURE — 99499 RISK ADDL DX/OHS AUDIT: ICD-10-PCS | Mod: S$GLB,,, | Performed by: INTERNAL MEDICINE

## 2023-01-25 PROCEDURE — 94618 PULMONARY STRESS TESTING: CPT | Mod: HCNC,S$GLB,, | Performed by: INTERNAL MEDICINE

## 2023-01-25 PROCEDURE — 3288F FALL RISK ASSESSMENT DOCD: CPT | Mod: HCNC,CPTII,S$GLB, | Performed by: INTERNAL MEDICINE

## 2023-01-25 PROCEDURE — 3075F SYST BP GE 130 - 139MM HG: CPT | Mod: HCNC,CPTII,S$GLB, | Performed by: INTERNAL MEDICINE

## 2023-01-25 PROCEDURE — 1160F RVW MEDS BY RX/DR IN RCRD: CPT | Mod: HCNC,CPTII,S$GLB, | Performed by: INTERNAL MEDICINE

## 2023-01-25 PROCEDURE — 3008F BODY MASS INDEX DOCD: CPT | Mod: HCNC,CPTII,S$GLB, | Performed by: INTERNAL MEDICINE

## 2023-01-25 PROCEDURE — 3078F DIAST BP <80 MM HG: CPT | Mod: HCNC,CPTII,S$GLB, | Performed by: INTERNAL MEDICINE

## 2023-01-25 PROCEDURE — 99999 PR PBB SHADOW E&M-EST. PATIENT-LVL III: ICD-10-PCS | Mod: PBBFAC,HCNC,, | Performed by: INTERNAL MEDICINE

## 2023-01-25 PROCEDURE — 1101F PT FALLS ASSESS-DOCD LE1/YR: CPT | Mod: HCNC,CPTII,S$GLB, | Performed by: INTERNAL MEDICINE

## 2023-01-25 PROCEDURE — 1159F PR MEDICATION LIST DOCUMENTED IN MEDICAL RECORD: ICD-10-PCS | Mod: HCNC,CPTII,S$GLB, | Performed by: INTERNAL MEDICINE

## 2023-01-25 PROCEDURE — 3008F PR BODY MASS INDEX (BMI) DOCUMENTED: ICD-10-PCS | Mod: HCNC,CPTII,S$GLB, | Performed by: INTERNAL MEDICINE

## 2023-01-25 PROCEDURE — 1159F MED LIST DOCD IN RCRD: CPT | Mod: HCNC,CPTII,S$GLB, | Performed by: INTERNAL MEDICINE

## 2023-01-25 PROCEDURE — 4010F ACE/ARB THERAPY RXD/TAKEN: CPT | Mod: HCNC,CPTII,S$GLB, | Performed by: INTERNAL MEDICINE

## 2023-01-25 RX ORDER — FLUTICASONE FUROATE, UMECLIDINIUM BROMIDE AND VILANTEROL TRIFENATATE 200; 62.5; 25 UG/1; UG/1; UG/1
1 POWDER RESPIRATORY (INHALATION) DAILY
Qty: 360 EACH | Refills: 5 | Status: SHIPPED | OUTPATIENT
Start: 2023-01-25

## 2023-01-25 NOTE — PROGRESS NOTES
Pulmonary Outpatient Follow Up Visit     Subjective:        Patient ID: Crow Green is a 67 y.o. male.    Chief Complaint: Asthma        HPI      67-year-old male patient presenting for 3 months follow-up.      01/25/2023 underwent CPAP titration.  Not using CPAP therapy intolerant and want to return the machine against medical advice.      Asthma not well-controlled asthma control test questionnaire score 11.      Initially evaluated September 2022 as a hospital follow-up after admission July 2022 for MRSA pneumonia and discharged home on oxygen.      Known with severe obstructive sleep apnea, lost his CPAP machine years ago, systolic heart failure.      Does complain of intermittent wheezing coughing SOB on exertion.      Does have a son with severe asthma who is not smoker.          Review of Systems   Constitutional:  Positive for weight gain, fatigue and weakness. Negative for fever and chills.   HENT:  Negative for nosebleeds.    Eyes:  Negative for redness.   Respiratory:  Positive for apnea, snoring, cough, shortness of breath, wheezing, previous hospitalization due to pulmonary problems, asthma nighttime symptoms, dyspnea on extertion, use of rescue inhaler and somnolence. Negative for choking.    Cardiovascular:  Positive for leg swelling. Negative for chest pain.   Genitourinary:  Negative for hematuria.   Endocrine:  Negative for cold intolerance.    Musculoskeletal:  Positive for arthralgias, back pain and gait problem.   Skin:  Negative for rash.   Gastrointestinal:  Negative for vomiting.   Neurological:  Negative for syncope.   Hematological:  Negative for adenopathy.   Psychiatric/Behavioral:  Positive for sleep disturbance. Negative for confusion.      Outpatient Encounter Medications as of 1/25/2023   Medication Sig Dispense Refill    albuterol (PROVENTIL) 2.5 mg /3 mL (0.083 %) nebulizer solution INHALE THE CONTENTS OF 1 VIAL VIA NEBULIZER EVERY 4  TO 6 HOURS AS NEEDED FOR WHEEZING OR FOR SHORTNESS OF BREATH 1080 mL 3    allopurinoL (ZYLOPRIM) 100 MG tablet Take 1 tablet (100 mg total) by mouth once daily. 90 tablet 1    amLODIPine (NORVASC) 2.5 MG tablet Take 1 tablet (2.5 mg total) by mouth once daily. 30 tablet 11    apixaban (ELIQUIS) 5 mg Tab TAKE 1 TABLET TWICE DAILY 180 tablet 0    aspirin (ECOTRIN) 81 MG EC tablet Take 1 tablet (81 mg total) by mouth once daily.      atorvastatin (LIPITOR) 40 MG tablet TAKE 1 TABLET BY MOUTH EVERY EVENING 90 tablet 3    azaTHIOprine (IMURAN) 50 mg Tab Take 1 tablet (50 mg total) by mouth once daily. 90 tablet 0    baclofen (LIORESAL) 10 MG tablet Take 1 tablet (10 mg total) by mouth daily as needed (back pain). 30 tablet 1    blood sugar diagnostic Strp 1 each by Misc.(Non-Drug; Combo Route) route 4 (four) times daily. 300 each 3    blood-glucose meter (TRUE METRIX GLUCOSE METER) kit Use as instructed to test blood glucose meter daily. 1 each 1    brinzolamide-brimonidine (SIMBRINZA) 1-0.2 % DrpS Place 1 drop into both eyes 3 (three) times daily. 8 mL 6    furosemide (LASIX) 40 MG tablet TAKE 2 TABLETS(80 MG) BY MOUTH TWICE DAILY 60 tablet 0    gabapentin (NEURONTIN) 800 MG tablet Take 1 tablet (800 mg total) by mouth 3 (three) times daily. 270 tablet 4    insulin aspart U-100 (NOVOLOG FLEXPEN U-100 INSULIN) 100 unit/mL (3 mL) InPn pen INJECT 10 UNITS SUBCUTANEOUSLY THREE TIMES DAILY WITH MEALS 15 mL 0    JARDIANCE 25 mg tablet TAKE 1 TABLET EVERY DAY 90 tablet 1    lisinopriL (PRINIVIL,ZESTRIL) 30 MG tablet TAKE 1 TABLET EVERY DAY 90 tablet 2    lisinopriL 10 MG tablet TAKE 1 TABLET EVERY DAY 90 tablet 2    MICRO THIN LANCETS 33 gauge Misc       pantoprazole (PROTONIX) 40 MG tablet Take 1 tablet (40 mg total) by mouth once daily. 90 tablet 1    papaverine-phentolamin-alprost 150 mg-5 mg- 50 mcg SolR 0.5 mLs by Intracavernosal route as needed (ED). 10 each ml    pen needle, diabetic (BD ULTRA-FINE SHORT PEN NEEDLE) 31  "gauge x 5/16" Ndle 1 each by Misc.(Non-Drug; Combo Route) route 3 (three) times daily. 300 each 3    ranolazine (RANEXA) 1,000 mg Tb12 Take 1 tablet (1,000 mg total) by mouth 2 (two) times daily. 60 tablet 11    semaglutide (OZEMPIC) 2 mg/dose (8 mg/3 mL) PnIj Inject 2 mg into the skin every 7 days. 9 mL 3    sertraline (ZOLOFT) 100 MG tablet TAKE 1 TABLET EVERY EVENING 90 tablet 4    tamsulosin (FLOMAX) 0.4 mg Cap Take 1 capsule (0.4 mg total) by mouth once daily. 90 capsule 1    traZODone (DESYREL) 100 MG tablet TAKE 2 TABLETS EVERY EVENING 180 tablet 4    VIOS AEROSOL DELIVERY SYSTEM Shefali USE AS DIRESTED  0    [DISCONTINUED] fluticasone-salmeterol diskus inhaler 250-50 mcg INHALE 1 PUFF TWICE DAILY (CONTROLLER) 180 each 3    [DISCONTINUED] ipratropium (ATROVENT) 0.02 % nebulizer solution INHALE THE CONTENTS OF 1 VIAL VIA NEBULIZER FOUR TIMES DAILY 1 Box 0    [DISCONTINUED] tiotropium (SPIRIVA WITH HANDIHALER) 18 mcg inhalation capsule INHALE THE CONTENTS OF 1 CAPSULE ONE TIME DAILY (CONTROLLER) 90 capsule 3    fluticasone-umeclidin-vilanter (TRELEGY ELLIPTA) 200-62.5-25 mcg inhaler Inhale 1 puff into the lungs once daily. 360 each 5    [DISCONTINUED] baclofen (LIORESAL) 10 MG tablet TAKE 1 TABLET (10 MG TOTAL) BY MOUTH ONCE DAILY. 30 tablet 0    [DISCONTINUED] diclofenac sodium (VOLTAREN) 1 % Gel APPLY 2 GRAMS TOPICALLY FOUR TIMES DAILY 600 g 2    [DISCONTINUED] ELIQUIS 5 mg Tab Take 1 tablet (5 mg total) by mouth 2 (two) times daily. 180 tablet 1    [DISCONTINUED] furosemide (LASIX) 40 MG tablet Take 2 tablets (80 mg total) by mouth 2 (two) times a day. 60 tablet 3    [DISCONTINUED] papaverine-phentolamin-alprost 150 mg-5 mg- 50 mcg SolR 0.5 mLs by Intracavernosal route as needed (ED). 10 each ml    [DISCONTINUED] sacubitriL-valsartan (ENTRESTO) 24-26 mg per tablet Take 1 tablet by mouth 2 (two) times daily. 120 tablet 1    [DISCONTINUED] sertraline (ZOLOFT) 100 MG tablet Take 1 tablet (100 mg total) by mouth " "every evening. 90 tablet 1     No facility-administered encounter medications on file as of 1/25/2023.       Objective:     Vital Signs (Most Recent)  Vital Signs  Pulse: 73  Resp: 16  SpO2: 97 %  BP: 131/71  Height and Weight  Height: 5' 5" (165.1 cm)  Weight: 88.9 kg (195 lb 15.8 oz)  BSA (Calculated - sq m): 2.02 sq meters  BMI (Calculated): 32.6  Weight in (lb) to have BMI = 25: 149.9]  Wt Readings from Last 2 Encounters:   01/25/23 88.9 kg (195 lb 15.8 oz)   01/25/23 88.9 kg (195 lb 15.8 oz)       Physical Exam   Constitutional: He is oriented to person, place, and time. He appears well-developed and well-nourished. No distress.   HENT:   Head: Normocephalic.   Cardiovascular: Normal rate and regular rhythm.   Pulmonary/Chest: Normal expansion and effort normal. No stridor. No respiratory distress. He has decreased breath sounds. He exhibits no tenderness.   Abdominal: He exhibits no distension.   Musculoskeletal:         General: No tenderness.      Cervical back: Neck supple.   Lymphadenopathy:     He has no cervical adenopathy.   Neurological: He is alert and oriented to person, place, and time. Gait abnormal.   Skin: Skin is warm. No cyanosis. Nails show no clubbing.   Psychiatric: He has a normal mood and affect. His behavior is normal. Judgment and thought content normal.   Nursing note and vitals reviewed.    Laboratory  Lab Results   Component Value Date    WBC 8.76 11/07/2022    RBC 4.80 11/07/2022    HGB 12.3 (L) 11/07/2022    HCT 42.2 11/07/2022    MCV 88 11/07/2022    MCH 25.6 (L) 11/07/2022    MCHC 29.1 (L) 11/07/2022    RDW 17.5 (H) 11/07/2022     11/07/2022    MPV 11.0 11/07/2022    GRAN 5.6 11/07/2022    GRAN 64.4 11/07/2022    LYMPH 2.2 11/07/2022    LYMPH 25.2 11/07/2022    MONO 0.8 11/07/2022    MONO 9.1 11/07/2022    EOS 0.1 11/07/2022    BASO 0.01 11/07/2022    EOSINOPHIL 0.9 11/07/2022    BASOPHIL 0.1 11/07/2022       BMP  Lab Results   Component Value Date     11/07/2022    K " 4.2 11/07/2022     11/07/2022    CO2 28 11/07/2022    BUN 11 11/07/2022    CREATININE 1.2 11/07/2022    CALCIUM 9.2 11/07/2022    ANIONGAP 8 11/07/2022    ESTGFRAFRICA >60 07/31/2022    EGFRNONAA >60 07/31/2022    AST 17 10/24/2022    ALT 12 10/24/2022    PROT 7.3 10/24/2022       Lab Results   Component Value Date     (H) 10/24/2022     (H) 09/23/2022     (H) 08/02/2022     (H) 07/30/2022     (H) 06/15/2022     (H) 03/02/2022       Lab Results   Component Value Date    TSH 1.529 07/27/2020       Lab Results   Component Value Date    SEDRATE 92 (H) 09/11/2020       Lab Results   Component Value Date    CRP 97.4 (H) 09/11/2020     Lab Results   Component Value Date    IGE 1367 (H) 09/07/2022        No results found for: ASPERGILLUS  No results found for: AFUMIGATUSCL     No results found for: ACE     Nuclear stress test August 2022             2D echo July 2022       The left ventricle is normal in size with severe concentric hypertrophy and mildly decreased systolic function.  Mild left atrial enlargement.  The estimated ejection fraction is 40 - 45%.  Grade III left ventricular diastolic dysfunction.  Normal right ventricular size with mildly reduced right ventricular systolic function.  Mild tricuspid regurgitation.  Mild pulmonic regurgitation.  Elevated central venous pressure (15 mmHg).  The estimated PA systolic pressure is 45 mmHg.  There is pulmonary hypertension.      PFT 2017 personal reviewed mild restriction and mild DLCO reduction       Diagnostic Results:  I have personally reviewed today the following studies:    Chest x-ray July 2022 left basilar opacity     Diagnostic polysomnography 2017     noted and discussed.  The diagnostic polysomnography revealed a probably severe obstructive sleep apnea / hypopnea syndrome  (A + H Index = 103.9 events / hr asleep with 87.7 arousals / hr asleep for the study, and a mean SpO2 value of 92.9 %,  moderate, minimum  oxygen saturation during sleep of 80.0 %, and waking baseline SpO2 = 96 %. There were no RERAs  (respiratory effort - related arousals). Sporadic, moderately loud snoring was noted.          Titration 2022         Assessment/Plan:   Elevated IgE level  -     Ambulatory referral/consult to Allergy; Future; Expected date: 2023  -     fluticasone-umeclidin-vilanter (TRELEGY ELLIPTA) 200-62.5-25 mcg inhaler; Inhale 1 puff into the lungs once daily.  Dispense: 360 each; Refill: 5    Uncontrolled persistent asthma  -     fluticasone-umeclidin-vilanter (TRELEGY ELLIPTA) 200-62.5-25 mcg inhaler; Inhale 1 puff into the lungs once daily.  Dispense: 360 each; Refill: 5  -     Fraction of  Nitric Oxide; Future        Continue albuterol p.r.n.     DC Atrovent DC Spiriva DC Advair started Trelegy 200 mcg 1 puff daily.  Check fraction of  nitric oxide.  Patient alleges that he is using his controller inhaler daily.    Evaluated by Allergy and immunology for elevated IgE.  House control of house mites suggested.  Remains uncontrolled.  I will recommend follow-up with Allergy immunology regarding candidacy for Xolair therapy.      He is on Imuran for dermatomyositis.      Did not qualify for exertional O2 based on 6 minute walking test.      Discussed with DME personnel, patient will return his CPAP machine AMA.            Follow up in about 6 months (around 2023).    This note was prepared using voice recognition system and is likely to have sound alike errors that may have been overlooked even after proof reading.  Please call me with any questions    Discussed diagnosis, its evaluation, treatment and usual course. All questions answered.      Osvaldo Kern MD

## 2023-01-25 NOTE — PROCEDURES
[facility]     SUMMARY     Ordering Provider:    Interpreting Provider:   Performing nurse/tech/RT: KRISTI Allen RRT  Diagnosis:  (Asthma and Family History of Asthma)  Height: 5' 5" (165.1 cm)  Weight: 88.9 kg (195 lb 15.8 oz)  BMI (Calculated): 32.6   Patient Race:             Phase Oxygen Assessment Supplemental O2 Heart   Rate Blood Pressure Bridgette Dyspnea Scale Rating   Resting 96 % Room Air 74 bpm 131/71 3   Exercise        Minute        1 96 % Room Air 76 bpm     2 96 % Room Air 84 bpm     3 96 % Room Air 84 bpm     4 95 % Room Air 87 bpm     5 97 % Room Air 87 bpm     6  96 % Room Air 87 bpm 143/63 3   Recovery        Minute        1 96 % Room Air 82 bpm     2 96 % Room Air 79 bpm     3 96 % Room Air 77 bpm     4 96 % Room Air 74 bpm 141/70 3     Six Minute Walk Summary  6MWT Status: completed without stopping  Patient Reported: Dyspnea, Leg pain, Lightheadedness, Dizziness (Back Pain)     Interpretation:  Did the patient stop or pause?: No                                         Total Time Walked (Calculated): 360 seconds  Final Partial Lap Distance (feet): 0 feet  Total Distance Meters (Calculated): 182.88 meters  Predicted Distance Meters (Calculated): 458.04 meters  Percentage of Predicted (Calculated): 39.93  Peak VO2 (Calculated): 9.47  Mets: 2.71  Has The Patient Had a Previous Six Minute Walk Test?: Yes       Previous 6MWT Results  Has The Patient Had a Previous Six Minute Walk Test?: Yes  Date of Previous Test: 08/07/19  Total Time Walked: 360 seconds  Total Distance (meters): 259.08  Predicted Distance (meters): 479.18 meters  Percentage of Predicted: 54.07  Percent Change (Calculated): 0.29    "

## 2023-01-26 LAB
BRPFT: ABNORMAL
DLCO ADJ PRE: 12.6 ML/(MIN*MMHG) (ref 16.99–30.85)
DLCO SINGLE BREATH LLN: 16.99
DLCO SINGLE BREATH PRE REF: 52.7 %
DLCO SINGLE BREATH REF: 23.92
DLCOC SBVA LLN: 2.56
DLCOC SBVA PRE REF: 143.5 %
DLCOC SBVA REF: 3.92
DLCOC SINGLE BREATH LLN: 16.99
DLCOC SINGLE BREATH PRE REF: 52.7 %
DLCOC SINGLE BREATH REF: 23.92
DLCOVA LLN: 2.56
DLCOVA PRE REF: 143.5 %
DLCOVA PRE: 5.62 ML/(MIN*MMHG*L) (ref 2.56–5.28)
DLCOVA REF: 3.92
DLVAADJ PRE: 5.62 ML/(MIN*MMHG*L) (ref 2.56–5.28)
ERV LLN: -16449.01
ERV PRE REF: 31.2 %
ERV REF: 0.99
FEF 25 75 CHG: 27.8 %
FEF 25 75 LLN: 0.79
FEF 25 75 POST REF: 95.5 %
FEF 25 75 PRE REF: 74.8 %
FEF 25 75 REF: 2.05
FET100 CHG: -4.5 %
FEV1 CHG: -3.6 %
FEV1 FVC CHG: 6 %
FEV1 FVC LLN: 66
FEV1 FVC POST REF: 108.5 %
FEV1 FVC PRE REF: 102.3 %
FEV1 FVC REF: 78
FEV1 LLN: 1.72
FEV1 POST REF: 54.2 %
FEV1 PRE REF: 56.2 %
FEV1 REF: 2.44
FRCPLETH LLN: 2.37
FRCPLETH PREREF: 83.1 %
FRCPLETH REF: 3.36
FVC CHG: -9 %
FVC LLN: 2.28
FVC POST REF: 50 %
FVC PRE REF: 55 %
FVC REF: 3.12
IVC PRE: 1.45 L (ref 2.28–3.96)
IVC SINGLE BREATH LLN: 2.28
IVC SINGLE BREATH PRE REF: 46.3 %
IVC SINGLE BREATH REF: 3.12
MVV LLN: 92
MVV PRE REF: 53.3 %
MVV REF: 109
PEF CHG: -6.4 %
PEF LLN: 4.73
PEF POST REF: 56.1 %
PEF PRE REF: 59.9 %
PEF REF: 7.01
POST FEF 25 75: 1.96 L/S (ref 0.79–3.31)
POST FET 100: 7.07 SEC
POST FEV1 FVC: 84.81 % (ref 65.67–90.66)
POST FEV1: 1.32 L (ref 1.72–3.16)
POST FVC: 1.56 L (ref 2.28–3.96)
POST PEF: 3.93 L/S (ref 4.73–9.3)
PRE DLCO: 12.6 ML/(MIN*MMHG) (ref 16.99–30.85)
PRE ERV: 0.31 L (ref -16449.01–16450.99)
PRE FEF 25 75: 1.53 L/S (ref 0.79–3.31)
PRE FET 100: 7.41 SEC
PRE FEV1 FVC: 80 % (ref 65.67–90.66)
PRE FEV1: 1.37 L (ref 1.72–3.16)
PRE FRC PL: 2.79 L
PRE FVC: 1.72 L (ref 2.28–3.96)
PRE MVV: 58 L/MIN (ref 92.48–125.12)
PRE PEF: 4.2 L/S (ref 4.73–9.3)
PRE RV: 2.3 L (ref 1.69–3.04)
PRE TLC: 4.16 L (ref 4.95–7.25)
RAW LLN: 3.06
RAW PRE REF: 93.1 %
RAW PRE: 2.85 CMH2O*S/L (ref 3.06–3.06)
RAW REF: 3.06
RV LLN: 1.69
RV PRE REF: 97.6 %
RV REF: 2.36
RVTLC LLN: 30
RVTLC PRE REF: 140.9 %
RVTLC PRE: 55.4 % (ref 30.33–48.29)
RVTLC REF: 39
TLC LLN: 4.95
TLC PRE REF: 68.1 %
TLC REF: 6.1
VA PRE: 2.24 L (ref 5.95–5.95)
VA SINGLE BREATH LLN: 5.95
VA SINGLE BREATH PRE REF: 37.7 %
VA SINGLE BREATH REF: 5.95
VC LLN: 2.28
VC PRE REF: 59.4 %
VC PRE: 1.85 L (ref 2.28–3.96)
VC REF: 3.12
VTGRAWPRE: 2.32 L

## 2023-01-28 NOTE — PROGRESS NOTES
Subjective:     Patient ID: Crow Green is a 67 y.o. male.    Chief Complaint: Nail Care (Pt c/o BL foot pain 7/10, Diabetic pt wears tennis shoes, PCP Dr. Polanco last seen 12-7-22) and Foot Pain      Crow is a 67 y.o. male who presents to the clinic for evaluation and treatment of high risk feet. Crow has a past medical history of Arthritis, Asthma, Atrial fibrillation, Atrial fibrillation with RVR (8/7/2019), CHF (congestive heart failure), Chronic obstructive pulmonary disease (8/5/2020), Depression, Dermatomyositis, Diabetes mellitus, type 2 (Diagnosed in 2000), Elevated PSA, Follow up (7/30/2020), Hypertension, Myocardial infarction, and Sleep apnea. The patient's chief complaint is long, thick toenails. This patient has documented high risk feet requiring routine maintenance secondary to diabetes mellitis and those secondary complications of diabetes, as mentioned..    PCP: Cm Polanco MD    Date Last Seen by PCP: 12/07/2022    Current shoe gear:  Affected Foot: Rx diabetic extra depth shoes and custom accommodative insoles     Unaffected Foot: Rx diabetic extra depth shoes and custom accommodative insoles    Hemoglobin A1C   Date Value Ref Range Status   01/04/2023 6.3 (H) 4.0 - 5.6 % Final     Comment:     ADA Screening Guidelines:  5.7-6.4%  Consistent with prediabetes  >or=6.5%  Consistent with diabetes    High levels of fetal hemoglobin interfere with the HbA1C  assay. Heterozygous hemoglobin variants (HbS, HgC, etc)do  not significantly interfere with this assay.   However, presence of multiple variants may affect accuracy.     07/31/2022 6.0 (H) 4.0 - 5.6 % Final     Comment:     ADA Screening Guidelines:  5.7-6.4%  Consistent with prediabetes  >or=6.5%  Consistent with diabetes    High levels of fetal hemoglobin interfere with the HbA1C  assay. Heterozygous hemoglobin variants (HbS, HgC, etc)do  not significantly interfere with this assay.   However, presence of multiple variants  may affect accuracy.     06/10/2022 6.4 (H) 4.0 - 5.6 % Final     Comment:     ADA Screening Guidelines:  5.7-6.4%  Consistent with prediabetes  >or=6.5%  Consistent with diabetes    High levels of fetal hemoglobin interfere with the HbA1C  assay. Heterozygous hemoglobin variants (HbS, HgC, etc)do  not significantly interfere with this assay.   However, presence of multiple variants may affect accuracy.         Patient Active Problem List   Diagnosis    ED (erectile dysfunction)    Non-proliferative diabetic retinopathy, mild, both eyes    Essential hypertension    Diabetic polyneuropathy    Nonobstructive coronary artery disease of native artery of native heart    Chronic combined systolic and diastolic heart failure    Type 2 diabetes mellitus    SANDRITA (obstructive sleep apnea)    H/O Asthma    Psychophysiological insomnia    Nightmares    Sleep related gastroesophageal reflux disease    Inadequate sleep hygiene    Chest pain    PAF (paroxysmal atrial fibrillation)    At risk for medication noncompliance    Obesity (BMI 30.0-34.9)    Indeterminate stage chronic open angle glaucoma    Right hip pain    Gait instability    Chronic right shoulder pain    Arthritis    Left sided sciatica    Osteoarthritis of spine with radiculopathy, lumbar region    HNP (herniated nucleus pulposus), lumbar    Gastroesophageal reflux disease without esophagitis    Hypogonadism in male    Disorder of parathyroid gland    Hyperlipidemia associated with type 2 diabetes mellitus    Traumatic tear of left rotator cuff    Other chronic pain    Left shoulder pain    Complete tear of left rotator cuff    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 associated with type 2 diabetes mellitus    Lumbar radiculopathy    Diabetic ulcer of toe of left foot associated with type 2 diabetes mellitus, with necrosis of muscle    Ulcer of left foot    Elevated troponin    Hypotension due to medication    Dermatomyositis     Postprocedural hypotension    Advanced directives, counseling/discussion    Schizoaffective disorder, depressive type    Chronic obstructive pulmonary disease    MDD (major depressive disorder), recurrent episode, moderate    DOMINIK (generalized anxiety disorder)    Bilateral carpal tunnel syndrome    Rotator cuff tear arthropathy of left shoulder    Sacroiliac dysfunction    Atherosclerosis of native coronary artery of native heart with unstable angina pectoris    Coronary vasospasm    Cocaine abuse    Osteopenia    Atherosclerosis of aorta    Cardiac asthma    Chronic gout of multiple sites    Acquired absence of left great toe    Acute on chronic respiratory failure with hypoxia    Hypoxia    Cardiomyopathy    Bilateral carotid artery stenosis    Substance abuse(UDS + for cocaine )    Immunocompromised patient    Encounter for preventive health examination    Other reduced mobility    Abnormality of gait and mobility    Dependence on supplemental oxygen    Positive depression screening    Pulmonary hypertension- echo 7/2022       Medication List with Changes/Refills   New Medications    ALBUTEROL (PROVENTIL) 2.5 MG /3 ML (0.083 %) NEBULIZER SOLUTION    INHALE THE CONTENTS OF 1 VIAL VIA NEBULIZER EVERY 4 TO 6 HOURS AS NEEDED FOR WHEEZING OR FOR SHORTNESS OF BREATH    BRINZOLAMIDE-BRIMONIDINE (SIMBRINZA) 1-0.2 % DRPS    Place 1 drop into both eyes 3 (three) times daily.    FLUTICASONE-UMECLIDIN-VILANTER (TRELEGY ELLIPTA) 200-62.5-25 MCG INHALER    Inhale 1 puff into the lungs once daily.    LISINOPRIL (PRINIVIL,ZESTRIL) 30 MG TABLET    TAKE 1 TABLET EVERY DAY    LISINOPRIL 10 MG TABLET    TAKE 1 TABLET EVERY DAY   Current Medications    ALLOPURINOL (ZYLOPRIM) 100 MG TABLET    Take 1 tablet (100 mg total) by mouth once daily.    AMLODIPINE (NORVASC) 2.5 MG TABLET    Take 1 tablet (2.5 mg total) by mouth once daily.    ASPIRIN (ECOTRIN) 81 MG EC TABLET    Take 1 tablet (81 mg total) by mouth once daily.    ATORVASTATIN  "(LIPITOR) 40 MG TABLET    TAKE 1 TABLET BY MOUTH EVERY EVENING    AZATHIOPRINE (IMURAN) 50 MG TAB    Take 1 tablet (50 mg total) by mouth once daily.    BACLOFEN (LIORESAL) 10 MG TABLET    Take 1 tablet (10 mg total) by mouth daily as needed (back pain).    BLOOD SUGAR DIAGNOSTIC STRP    1 each by Misc.(Non-Drug; Combo Route) route 4 (four) times daily.    BLOOD-GLUCOSE METER (TRUE METRIX GLUCOSE METER) KIT    Use as instructed to test blood glucose meter daily.    FUROSEMIDE (LASIX) 40 MG TABLET    TAKE 2 TABLETS(80 MG) BY MOUTH TWICE DAILY    GABAPENTIN (NEURONTIN) 800 MG TABLET    Take 1 tablet (800 mg total) by mouth 3 (three) times daily.    INSULIN ASPART U-100 (NOVOLOG FLEXPEN U-100 INSULIN) 100 UNIT/ML (3 ML) INPN PEN    INJECT 10 UNITS SUBCUTANEOUSLY THREE TIMES DAILY WITH MEALS    JARDIANCE 25 MG TABLET    TAKE 1 TABLET EVERY DAY    MICRO THIN LANCETS 33 GAUGE MISC        PANTOPRAZOLE (PROTONIX) 40 MG TABLET    Take 1 tablet (40 mg total) by mouth once daily.    PEN NEEDLE, DIABETIC (BD ULTRA-FINE SHORT PEN NEEDLE) 31 GAUGE X 5/16" NDLE    1 each by Misc.(Non-Drug; Combo Route) route 3 (three) times daily.    RANOLAZINE (RANEXA) 1,000 MG TB12    Take 1 tablet (1,000 mg total) by mouth 2 (two) times daily.    SEMAGLUTIDE (OZEMPIC) 2 MG/DOSE (8 MG/3 ML) PNIJ    Inject 2 mg into the skin every 7 days.    TAMSULOSIN (FLOMAX) 0.4 MG CAP    Take 1 capsule (0.4 mg total) by mouth once daily.    TRAZODONE (DESYREL) 100 MG TABLET    TAKE 2 TABLETS EVERY EVENING    VIOS AEROSOL DELIVERY SYSTEM VIN    USE AS DIRESTED   Changed and/or Refilled Medications    Modified Medication Previous Medication    APIXABAN (ELIQUIS) 5 MG TAB ELIQUIS 5 mg Tab       TAKE 1 TABLET TWICE DAILY    Take 1 tablet (5 mg total) by mouth 2 (two) times daily.    PAPAVERINE-PHENTOLAMIN-ALPROST 150 MG-5 MG- 50 MCG SOLR papaverine-phentolamin-alprost 150 mg-5 mg- 50 mcg SolR       0.5 mLs by Intracavernosal route as needed (ED).    0.5 mLs by " Intracavernosal route as needed (ED).    SERTRALINE (ZOLOFT) 100 MG TABLET sertraline (ZOLOFT) 100 MG tablet       TAKE 1 TABLET EVERY EVENING    Take 1 tablet (100 mg total) by mouth every evening.   Discontinued Medications    IPRATROPIUM (ATROVENT) 0.02 % NEBULIZER SOLUTION    INHALE THE CONTENTS OF 1 VIAL VIA NEBULIZER FOUR TIMES DAILY    TIOTROPIUM (SPIRIVA WITH HANDIHALER) 18 MCG INHALATION CAPSULE    INHALE THE CONTENTS OF 1 CAPSULE ONE TIME DAILY (CONTROLLER)       Review of patient's allergies indicates:   Allergen Reactions    Protamine Hives     Urticaria, possible upper airway swelling       Past Surgical History:   Procedure Laterality Date    ABLATION OF ARRHYTHMOGENIC FOCUS FOR ATRIAL FIBRILLATION N/A 2/27/2020    Procedure: Ablation atrial fibrillation;  Surgeon: Gio Brown MD;  [facility]: [facility] EP LAB;  Service: Cardiology;  Laterality: N/A;  afib, DAMIEN (cx if SR), PVI, PITO, anes, MB, 3 Prep    CHOLECYSTECTOMY      CLOSURE OF WOUND Left 7/1/2020    Procedure: CLOSURE, WOUND;  Surgeon: Barb Veras DPM;  Location: [facility] OR;  Service: Podiatry;  Laterality: Left;    COLONOSCOPY N/A 3/4/2022    Procedure: COLONOSCOPY;  Surgeon: Yolis Freitas MD;  Location: [facility] ENDO;  Service: Endoscopy;  Laterality: N/A;    ESOPHAGOGASTRODUODENOSCOPY N/A 3/4/2022    Procedure: EGD (ESOPHAGOGASTRODUODENOSCOPY);  Surgeon: Yolis Freitas MD;  Location: [facility] ENDO;  Service: Endoscopy;  Laterality: N/A;    INJECTION OF ANESTHETIC AGENT INTO SACROILIAC JOINT Left 3/24/2021    Procedure: Left BLOCK, SACROILIAC JOINT and bilateral rhomboid TPI;  Surgeon: Dillan Tsai MD;  Location: [facility] PAIN MGT;  Service: Pain Management;  Laterality: Left;    INTRALUMINAL GASTROINTESTINAL TRACT IMAGING VIA CAPSULE N/A 3/22/2022    Procedure: IMAGING PROCEDURE, GI TRACT, INTRALUMINAL, VIA CAPSULE;  Surgeon: Justice Martinez RN;  Location: [facility] ENDO;  Service: Endoscopy;  Laterality: N/A;    REVERSE TOTAL SHOULDER ARTHROPLASTY  Left 1/10/2022    Procedure: ARTHROPLASTY, SHOULDER, TOTAL, REVERSE;  Surgeon: Anthony Alvarado MD;  Location: [facility] OR;  Service: Orthopedics;  Laterality: Left;  left reverse total shoulder arthroplasty     SELECTIVE INJECTION OF ANESTHETIC AGENT AROUND LUMBAR SPINAL NERVE ROOT BY TRANSFORAMINAL APPROACH Left 6/11/2020    Procedure: BLOCK, SPINAL NERVE ROOT, LUMBAR, SELECTIVE, TRANSFORAMINAL APPROACH Left L4-5, L5-S1 TFESI with RN IV sedation;  Surgeon: Dillan Tsai MD;  Location: [facility] PAIN MGT;  Service: Pain Management;  Laterality: Left;    TOE AMPUTATION Left 6/29/2020    Procedure: AMPUTATION, TOE;  Surgeon: Barb Veras DPM;  Location: [facility] OR;  Service: Podiatry;  Laterality: Left;  great toe       Family History   Problem Relation Age of Onset    Glaucoma Mother     Cataracts Mother     Diabetes Mother     Cataracts Father     Stroke Father     Glaucoma Sister         x3    Cataracts Sister         x3    Diabetes Sister         x1    Stroke Sister         x1    Glaucoma Maternal Aunt     Diabetes Maternal Aunt     No Known Problems Brother     No Known Problems Daughter     No Known Problems Son     Cancer Maternal Grandfather         lung (smoker)    Prostate cancer Neg Hx     Heart disease Neg Hx     Kidney disease Neg Hx        Social History     Socioeconomic History    Marital status: Legally     Number of children: 5   Occupational History    Occupation: disabled    Occupation: PT at [employer]    Tobacco Use    Smoking status: Never    Smokeless tobacco: Never   Substance and Sexual Activity    Alcohol use: Yes     Comment: rare, maybe holidays    Drug use: Not Currently     Types: Cocaine    Sexual activity: Not Currently     Partners: Female   Social History Narrative    Utilizing [email or web address] transportation which has been unreliable.      Social Determinants of Health     Financial Resource Strain: Low Risk     Difficulty of Paying Living Expenses: Not very hard  "  Food Insecurity: No Food Insecurity    Worried About Running Out of Food in the Last Year: Never true    Ran Out of Food in the Last Year: Never true   Transportation Needs: Unmet Transportation Needs    Lack of Transportation (Medical): Yes    Lack of Transportation (Non-Medical): Yes   Physical Activity: Inactive    Days of Exercise per Week: 0 days    Minutes of Exercise per Session: 0 min   Stress: Stress Concern Present    Feeling of Stress : Rather much   Social Connections: Moderately Isolated    Frequency of Communication with Friends and Family: Once a week    Frequency of Social Gatherings with Friends and Family: Once a week    Attends Christian Services: More than 4 times per year    Active Member of Clubs or Organizations: No    Attends Club or Organization Meetings: More than 4 times per year    Marital Status:    Housing Stability: Low Risk     Unable to Pay for Housing in the Last Year: No    Number of Places Lived in the Last Year: 1    Unstable Housing in the Last Year: No       Vitals:    01/11/23 1011   Weight: 84.8 kg (186 lb 15.2 oz)   Height: 5' 5" (1.651 m)   PainSc:   7       Hemoglobin A1C   Date Value Ref Range Status   01/04/2023 6.3 (H) 4.0 - 5.6 % Final     Comment:     ADA Screening Guidelines:  5.7-6.4%  Consistent with prediabetes  >or=6.5%  Consistent with diabetes    High levels of fetal hemoglobin interfere with the HbA1C  assay. Heterozygous hemoglobin variants (HbS, HgC, etc)do  not significantly interfere with this assay.   However, presence of multiple variants may affect accuracy.     07/31/2022 6.0 (H) 4.0 - 5.6 % Final     Comment:     ADA Screening Guidelines:  5.7-6.4%  Consistent with prediabetes  >or=6.5%  Consistent with diabetes    High levels of fetal hemoglobin interfere with the HbA1C  assay. Heterozygous hemoglobin variants (HbS, HgC, etc)do  not significantly interfere with this assay.   However, presence of multiple variants may affect accuracy.   "   06/10/2022 6.4 (H) 4.0 - 5.6 % Final     Comment:     ADA Screening Guidelines:  5.7-6.4%  Consistent with prediabetes  >or=6.5%  Consistent with diabetes    High levels of fetal hemoglobin interfere with the HbA1C  assay. Heterozygous hemoglobin variants (HbS, HgC, etc)do  not significantly interfere with this assay.   However, presence of multiple variants may affect accuracy.         Review of Systems   Constitutional:  Negative for chills and fever.   Respiratory:  Negative for shortness of breath.    Cardiovascular:  Negative for chest pain, palpitations, orthopnea, claudication and leg swelling.   Gastrointestinal:  Negative for diarrhea, nausea and vomiting.   Musculoskeletal:  Negative for joint pain.   Skin:  Negative for rash.   Neurological:  Positive for tingling and sensory change.   Psychiatric/Behavioral: Negative.             Objective:      PHYSICAL EXAM: Apperance: Alert and orient in no distress,well developed, and with good attention to grooming and body habits  Patient presents ambulating in diabetic shoes and inserts.   LOWER EXTREMITY EXAM:  VASCULAR: Dorsalis pedis pulses 2/4 bilateral and Posterior Tibial pulses 2/4 bilateral. Capillary fill time <4 seconds bilateral. No edema observed bilateral. Varicosities absent bilateral. Skin temperature of the lower extremities is warm to warm, proximal to distal. Hair growth dim bilateral.  DERMATOLOGICAL: No skin rashes, subcutaneous nodules, lesions, or ulcers observed bilateral. Nails 1,2,3,4,5 right and 2,3,4,5 left elongated, thickened, and discolored with subungual debris. Webspaces 1,2,3,4 bilateral clean, dry and without evidence of break in skin integrity. Mild hyperkeratotic tissue noted to left medial 1st MPJ and distal hallux amputation stump.   NEUROLOGICAL: Light touch, sharp-dull, proprioception all present and equal bilaterally.  Vibratory sensation diminished at bilateral hallux. Protective sensation absent sites as tested with a  [place] 5.07 monofilament.   MUSCULOSKELETAL: Muscle strength is 5/5 for foot inverters, everters, plantarflexors, and dorsiflexors. Muscle tone is normal. Left hallux amputation noted.           Assessment:       ICD-10-CM ICD-9-CM   1. Type II diabetes mellitus with neurological manifestations  E11.49 250.60   2. Pre-ulcerative calluses - Left Foot  L84 700   3. Dermatophytosis of nail  B35.1 110.1           Plan:   Type II diabetes mellitus with neurological manifestations    Pre-ulcerative calluses - Left Foot    Dermatophytosis of nail    I counseled the patient on his conditions, regarding findings of my examination, my impressions, and usual treatment plan.   Appointment spent on education about the diabetic foot, neuropathy, and prevention of limb loss.  Shoe inspection. Diabetic Foot Education. Patient reminded of the importance of good nutrition and blood sugar control to help prevent podiatric complications of diabetes. Patient instructed on proper foot hygeine. We discussed wearing proper shoe gear, daily foot inspections, never walking without protective shoe gear, never putting sharp instruments to feet.    With patient's permission, nails 1-5 bilateral were debrided/trimmed in length and thickness aggressively to their soft tissue attachment mechanically and with electric , removing all offending nail and debris. Patient relates relief following the procedure.  With patient's permission, left foot callus trimmed in thickness with #15 blade in thickness without incident.   Patient  will continue to monitor the areas daily, inspect feet, wear protective shoe gear when ambulatory, moisturizer to maintain skin integrity. Patient reminded of the importance of good nutrition and blood sugar control to help prevent podiatric complications of diabetes.  Patient to return 4 months or sooner if needed.          Barb Veras DPM  [facility]

## 2023-01-30 ENCOUNTER — OFFICE VISIT (OUTPATIENT)
Dept: ALLERGY | Facility: CLINIC | Age: 68
End: 2023-01-30
Payer: MEDICARE

## 2023-01-30 ENCOUNTER — DOCUMENT SCAN (OUTPATIENT)
Dept: HOME HEALTH SERVICES | Facility: HOSPITAL | Age: 68
End: 2023-01-30
Payer: MEDICARE

## 2023-01-30 VITALS
BODY MASS INDEX: 31.44 KG/M2 | HEART RATE: 79 BPM | WEIGHT: 188.69 LBS | HEIGHT: 65 IN | SYSTOLIC BLOOD PRESSURE: 117 MMHG | DIASTOLIC BLOOD PRESSURE: 69 MMHG

## 2023-01-30 DIAGNOSIS — R76.8 ELEVATED IGE LEVEL: ICD-10-CM

## 2023-01-30 DIAGNOSIS — J30.89 ALLERGIC RHINITIS DUE TO AMERICAN HOUSE DUST MITE: Primary | ICD-10-CM

## 2023-01-30 DIAGNOSIS — J45.40 MODERATE PERSISTENT ASTHMA WITHOUT COMPLICATION: ICD-10-CM

## 2023-01-30 PROCEDURE — 3008F BODY MASS INDEX DOCD: CPT | Mod: HCNC,CPTII,S$GLB, | Performed by: ALLERGY & IMMUNOLOGY

## 2023-01-30 PROCEDURE — 99999 PR PBB SHADOW E&M-EST. PATIENT-LVL III: CPT | Mod: PBBFAC,HCNC,, | Performed by: ALLERGY & IMMUNOLOGY

## 2023-01-30 PROCEDURE — 3074F SYST BP LT 130 MM HG: CPT | Mod: HCNC,CPTII,S$GLB, | Performed by: ALLERGY & IMMUNOLOGY

## 2023-01-30 PROCEDURE — 3078F DIAST BP <80 MM HG: CPT | Mod: HCNC,CPTII,S$GLB, | Performed by: ALLERGY & IMMUNOLOGY

## 2023-01-30 PROCEDURE — 1159F PR MEDICATION LIST DOCUMENTED IN MEDICAL RECORD: ICD-10-PCS | Mod: HCNC,CPTII,S$GLB, | Performed by: ALLERGY & IMMUNOLOGY

## 2023-01-30 PROCEDURE — 3074F PR MOST RECENT SYSTOLIC BLOOD PRESSURE < 130 MM HG: ICD-10-PCS | Mod: HCNC,CPTII,S$GLB, | Performed by: ALLERGY & IMMUNOLOGY

## 2023-01-30 PROCEDURE — 3288F PR FALLS RISK ASSESSMENT DOCUMENTED: ICD-10-PCS | Mod: HCNC,CPTII,S$GLB, | Performed by: ALLERGY & IMMUNOLOGY

## 2023-01-30 PROCEDURE — 3044F HG A1C LEVEL LT 7.0%: CPT | Mod: HCNC,CPTII,S$GLB, | Performed by: ALLERGY & IMMUNOLOGY

## 2023-01-30 PROCEDURE — 99999 PR PBB SHADOW E&M-EST. PATIENT-LVL III: ICD-10-PCS | Mod: PBBFAC,HCNC,, | Performed by: ALLERGY & IMMUNOLOGY

## 2023-01-30 PROCEDURE — 1159F MED LIST DOCD IN RCRD: CPT | Mod: HCNC,CPTII,S$GLB, | Performed by: ALLERGY & IMMUNOLOGY

## 2023-01-30 PROCEDURE — 99214 PR OFFICE/OUTPT VISIT, EST, LEVL IV, 30-39 MIN: ICD-10-PCS | Mod: HCNC,S$GLB,, | Performed by: ALLERGY & IMMUNOLOGY

## 2023-01-30 PROCEDURE — 1101F PT FALLS ASSESS-DOCD LE1/YR: CPT | Mod: HCNC,CPTII,S$GLB, | Performed by: ALLERGY & IMMUNOLOGY

## 2023-01-30 PROCEDURE — 3008F PR BODY MASS INDEX (BMI) DOCUMENTED: ICD-10-PCS | Mod: HCNC,CPTII,S$GLB, | Performed by: ALLERGY & IMMUNOLOGY

## 2023-01-30 PROCEDURE — 4010F ACE/ARB THERAPY RXD/TAKEN: CPT | Mod: HCNC,CPTII,S$GLB, | Performed by: ALLERGY & IMMUNOLOGY

## 2023-01-30 PROCEDURE — 99214 OFFICE O/P EST MOD 30 MIN: CPT | Mod: HCNC,S$GLB,, | Performed by: ALLERGY & IMMUNOLOGY

## 2023-01-30 PROCEDURE — 3044F PR MOST RECENT HEMOGLOBIN A1C LEVEL <7.0%: ICD-10-PCS | Mod: HCNC,CPTII,S$GLB, | Performed by: ALLERGY & IMMUNOLOGY

## 2023-01-30 PROCEDURE — 1125F AMNT PAIN NOTED PAIN PRSNT: CPT | Mod: HCNC,CPTII,S$GLB, | Performed by: ALLERGY & IMMUNOLOGY

## 2023-01-30 PROCEDURE — 3078F PR MOST RECENT DIASTOLIC BLOOD PRESSURE < 80 MM HG: ICD-10-PCS | Mod: HCNC,CPTII,S$GLB, | Performed by: ALLERGY & IMMUNOLOGY

## 2023-01-30 PROCEDURE — 4010F PR ACE/ARB THEARPY RXD/TAKEN: ICD-10-PCS | Mod: HCNC,CPTII,S$GLB, | Performed by: ALLERGY & IMMUNOLOGY

## 2023-01-30 PROCEDURE — 1125F PR PAIN SEVERITY QUANTIFIED, PAIN PRESENT: ICD-10-PCS | Mod: HCNC,CPTII,S$GLB, | Performed by: ALLERGY & IMMUNOLOGY

## 2023-01-30 PROCEDURE — 3288F FALL RISK ASSESSMENT DOCD: CPT | Mod: HCNC,CPTII,S$GLB, | Performed by: ALLERGY & IMMUNOLOGY

## 2023-01-30 PROCEDURE — 1101F PR PT FALLS ASSESS DOC 0-1 FALLS W/OUT INJ PAST YR: ICD-10-PCS | Mod: HCNC,CPTII,S$GLB, | Performed by: ALLERGY & IMMUNOLOGY

## 2023-01-30 RX ORDER — EPINEPHRINE 0.3 MG/.3ML
1 INJECTION SUBCUTANEOUS ONCE
Qty: 2 EACH | Refills: 3 | Status: SHIPPED | OUTPATIENT
Start: 2023-01-30 | End: 2023-12-22 | Stop reason: SDUPTHER

## 2023-01-30 RX ORDER — OMALIZUMAB 75 MG/.5ML
75 INJECTION, SOLUTION SUBCUTANEOUS
Qty: 2 EACH | Refills: 11 | Status: SHIPPED | OUTPATIENT
Start: 2023-01-30 | End: 2023-12-22 | Stop reason: SDUPTHER

## 2023-01-30 RX ORDER — OMALIZUMAB 150 MG/ML
300 INJECTION, SOLUTION SUBCUTANEOUS
Qty: 4 EACH | Refills: 11 | Status: SHIPPED | OUTPATIENT
Start: 2023-01-30 | End: 2023-12-22 | Stop reason: SDUPTHER

## 2023-01-30 NOTE — PROGRESS NOTES
"Subjective:   Patient: Crow Green [id number], :1955   Visit date:2023 2:39 PM    Chief Complaint:  Follow-up and Other (Elevated igE and poorly controlled asthma)      HPI:    Prior notes reviewed by myself.  Clinical documentation obtained by nursing staff reviewed.       67 year old male with asthma, COPD and dust mite allergy.  He is on O2 "all the time". He currently does not have O2 on today in the office- "I don't want to travel with that big tank on me."  Last required his rescue inhaler yesterday. He reports requiring it daily.  He reports that exertion exacerbates symptoms.  He reports coughing and wheezing at night.  He denies requiring oral steroids or a steroid injection.  He reports that he is taking Flonase, which helps his runny nose and nasal congestion.    HPI:     24 - 66 yo M referred by pulm for an evaluation of elevated IgE:    Hx of copd and asthma ; advair and albuterol  Hx of SANDRITA - CPAP  Hx of CHF - follows with cardiologist    Elevated IgE (1367)  Never tested for allergies, has not received AIT  Ssx - rhinorrhea, nasal congestion, sneezing, watery eyes; intermittent,, year round  Meds - otc allergy eye drops; no anti-histamines    No food allergies    Environmental History:  Environmental Hx: Pets in the home: none. Does not smoke.       Past Medical History:   Diagnosis Date    Arthritis     Asthma     Atrial fibrillation     Atrial fibrillation with RVR 2019    CHF (congestive heart failure)     Chronic obstructive pulmonary disease 2020    Depression     Dermatomyositis     Diabetes mellitus, type 2 Diagnosed in     Elevated PSA     Follow up 2020    Hypertension     Myocardial infarction     2017    Sleep apnea         Family History   Problem Relation Age of Onset    Glaucoma Mother     Cataracts Mother     Diabetes Mother     Cataracts Father     Stroke Father     Glaucoma Sister         x3    Cataracts Sister         x3    Diabetes Sister  "        x1    Stroke Sister         x1    Glaucoma Maternal Aunt     Diabetes Maternal Aunt     No Known Problems Brother     No Known Problems Daughter     No Known Problems Son     Cancer Maternal Grandfather         lung (smoker)    Prostate cancer Neg Hx     Heart disease Neg Hx     Kidney disease Neg Hx        Social History     Socioeconomic History    Marital status: Legally     Number of children: 5   Occupational History    Occupation: disabled    Occupation: PT at [employer]    Tobacco Use    Smoking status: Never    Smokeless tobacco: Never   Substance and Sexual Activity    Alcohol use: Yes     Comment: rare, maybe holidays    Drug use: Not Currently     Types: Cocaine    Sexual activity: Not Currently     Partners: Female   Social History Narrative    Utilizing [employer] transportation which has been unreliable.      Social Determinants of Health     Financial Resource Strain: Low Risk     Difficulty of Paying Living Expenses: Not very hard   Food Insecurity: No Food Insecurity    Worried About Running Out of Food in the Last Year: Never true    Ran Out of Food in the Last Year: Never true   Transportation Needs: Unmet Transportation Needs    Lack of Transportation (Medical): Yes    Lack of Transportation (Non-Medical): Yes   Physical Activity: Inactive    Days of Exercise per Week: 0 days    Minutes of Exercise per Session: 0 min   Stress: Stress Concern Present    Feeling of Stress : Rather much   Social Connections: Moderately Isolated    Frequency of Communication with Friends and Family: Once a week    Frequency of Social Gatherings with Friends and Family: Once a week    Attends [employer] Services: More than 4 times per year    Active Member of Clubs or Organizations: No    Attends Club or Organization Meetings: More than 4 times per year    Marital Status:    Housing Stability: Low Risk     Unable to Pay for Housing in the Last Year: No    Number of Places Lived in  the Last Year: 1    Unstable Housing in the Last Year: No       Review of patient's allergies indicates:   Allergen Reactions    Protamine Hives     Urticaria, possible upper airway swelling           Medication List with Changes/Refills   New Medications    EPINEPHRINE (EPIPEN) 0.3 MG/0.3 ML ATIN    Inject 0.3 mLs (0.3 mg total) into the muscle once. for 1 dose    OMALIZUMAB (XOLAIR) 150 MG/ML INJECTION    Inject 2 mLs (300 mg total) into the skin every 14 (fourteen) days.    OMALIZUMAB (XOLAIR) 75 MG/0.5 ML INJECTION    Inject 0.5 mLs (75 mg total) into the skin every 14 (fourteen) days.   Current Medications    ALBUTEROL (PROVENTIL) 2.5 MG /3 ML (0.083 %) NEBULIZER SOLUTION    INHALE THE CONTENTS OF 1 VIAL VIA NEBULIZER EVERY 4 TO 6 HOURS AS NEEDED FOR WHEEZING OR FOR SHORTNESS OF BREATH    ALLOPURINOL (ZYLOPRIM) 100 MG TABLET    Take 1 tablet (100 mg total) by mouth once daily.    AMLODIPINE (NORVASC) 2.5 MG TABLET    Take 1 tablet (2.5 mg total) by mouth once daily.    APIXABAN (ELIQUIS) 5 MG TAB    TAKE 1 TABLET TWICE DAILY    ASPIRIN (ECOTRIN) 81 MG EC TABLET    Take 1 tablet (81 mg total) by mouth once daily.    ATORVASTATIN (LIPITOR) 40 MG TABLET    TAKE 1 TABLET BY MOUTH EVERY EVENING    AZATHIOPRINE (IMURAN) 50 MG TAB    Take 1 tablet (50 mg total) by mouth once daily.    BACLOFEN (LIORESAL) 10 MG TABLET    Take 1 tablet (10 mg total) by mouth daily as needed (back pain).    BLOOD SUGAR DIAGNOSTIC STRP    1 each by Misc.(Non-Drug; Combo Route) route 4 (four) times daily.    BLOOD-GLUCOSE METER (TRUE METRIX GLUCOSE METER) KIT    Use as instructed to test blood glucose meter daily.    BRINZOLAMIDE-BRIMONIDINE (SIMBRINZA) 1-0.2 % DRPS    Place 1 drop into both eyes 3 (three) times daily.    FLUTICASONE-UMECLIDIN-VILANTER (TRELEGY ELLIPTA) 200-62.5-25 MCG INHALER    Inhale 1 puff into the lungs once daily.    FUROSEMIDE (LASIX) 40 MG TABLET    TAKE 2 TABLETS(80 MG) BY MOUTH TWICE DAILY    GABAPENTIN  "(NEURONTIN) 800 MG TABLET    Take 1 tablet (800 mg total) by mouth 3 (three) times daily.    INSULIN ASPART U-100 (NOVOLOG FLEXPEN U-100 INSULIN) 100 UNIT/ML (3 ML) INPN PEN    INJECT 10 UNITS SUBCUTANEOUSLY THREE TIMES DAILY WITH MEALS    JARDIANCE 25 MG TABLET    TAKE 1 TABLET EVERY DAY    LISINOPRIL (PRINIVIL,ZESTRIL) 30 MG TABLET    TAKE 1 TABLET EVERY DAY    LISINOPRIL 10 MG TABLET    TAKE 1 TABLET EVERY DAY    MICRO THIN LANCETS 33 GAUGE MISC        PANTOPRAZOLE (PROTONIX) 40 MG TABLET    Take 1 tablet (40 mg total) by mouth once daily.    PAPAVERINE-PHENTOLAMIN-ALPROST 150 MG-5 MG- 50 MCG SOLR    0.5 mLs by Intracavernosal route as needed (ED).    PEN NEEDLE, DIABETIC (BD ULTRA-FINE SHORT PEN NEEDLE) 31 GAUGE X 5/16" NDLE    1 each by Misc.(Non-Drug; Combo Route) route 3 (three) times daily.    RANOLAZINE (RANEXA) 1,000 MG TB12    Take 1 tablet (1,000 mg total) by mouth 2 (two) times daily.    SEMAGLUTIDE (OZEMPIC) 2 MG/DOSE (8 MG/3 ML) PNIJ    Inject 2 mg into the skin every 7 days.    SERTRALINE (ZOLOFT) 100 MG TABLET    TAKE 1 TABLET EVERY EVENING    TAMSULOSIN (FLOMAX) 0.4 MG CAP    Take 1 capsule (0.4 mg total) by mouth once daily.    TRAZODONE (DESYREL) 100 MG TABLET    TAKE 2 TABLETS EVERY EVENING    VIOS AEROSOL DELIVERY SYSTEM VIN    USE AS DIRESTED         Objective:     Physical Exam:  Vitals:  /69 (BP Location: Left arm, Patient Position: Sitting)   Pulse 79   Ht 5' 5" (1.651 m)   Wt 85.6 kg (188 lb 11.4 oz)   BMI 31.40 kg/m²   Constitutional:       General: No acute distress. Alert, well developed.   HENT:      Head: Normocephalic, no lesions.      Right Ear: Pinna and external ear appears normal. There is no impacted cerumen. TM clear, intact, wnl. EAC WNL.      Left Ear: Pinna and external ear appears normal. There is no impacted cerumen. TM clear, intact, wnl. EAC WNL.     Nose: No mass or lesion. Clear mucus. BIT's WNL.     Mouth/Throat: No oropharyngeal exudate or posterior " oropharyngeal erythema.   Eyes:      General: No scleral icterus.Sclera white, extraocular movements intact.        Right eye: No discharge.         Left eye: No discharge.   Neck: Supple, no palpable nodes, no masses, trachea midline, no thyromegaly.   Cardiovascular:      Rate and Rhythm: Normal rate and regular rhythm.      Heart sounds: Normal heart sounds. No murmur heard.   Pulmonary:      Effort: Pulmonary effort is normal. No respiratory distress.      Breath sounds: Normal breath sounds. No stridor. No rhonchi, or rales. No crackles or wheezing.   Musculoskeletal:         General: No swelling, tenderness, deformity or signs of injury.    Skin:     General: Skin is warm.      Coloration: Skin is not jaundiced or pale.      Findings: No bruising, erythema, or rash.   Neurological:      Alert. Moves all extremities spontaneously  Psychiatric:         Mood is normal. Behavior is normal.              Assessment & Plan:   Allergic rhinitis due to [place] house dust mite    Elevated IgE level  -     Ambulatory referral/consult to Allergy    Moderate persistent asthma without complication  -     EPINEPHrine (EPIPEN) 0.3 mg/0.3 mL AtIn; Inject 0.3 mLs (0.3 mg total) into the muscle once. for 1 dose  Dispense: 2 each; Refill: 3  -     omalizumab (XOLAIR) 150 mg/mL injection; Inject 2 mLs (300 mg total) into the skin every 14 (fourteen) days.  Dispense: 4 each; Refill: 11  -     omalizumab (XOLAIR) 75 mg/0.5 mL injection; Inject 0.5 mLs (75 mg total) into the skin every 14 (fourteen) days.  Dispense: 2 each; Refill: 11      Discussed Xolair. Risk associated with Xolair, including anaphylaxis were explained with witness present. Advised that he must wait 2 hours after the initial injection. He must have an Epipen 2 pack with him at all times.  Epipen ordered-reviewed use, care and administration.    Consent signed. No barriers to communication.  Xolair ordered.    RTC 3-4 months or sooner, if needed      ELISEO VERAS                  Problems Address                                                 Amount and/or Complexity                                                                      Risk       3           [] 2 or more self-limited or minor problems                      [] Limited                                                                        [] Low                  [] 1 stable chronic illness                                                  Any combination of the two                                               OTC drugs                  []Acute, uncomplicated illness or injury                            Review of prior external notes from unique source           Minor surgery with no risk factors                                                                                                               [] 1 []2  []3+                                                                                                              Review of results from each unique test                                                                                                               [] 1 []2  [] 3+                                                                                                              Order of each unique test                                                                                                               [] 1 []2  [] 3+                                                                                                              Or                                                                                                             [] Assessment requiring an independent historian      4            [] One or more chronic illness with exacerbation,              [] Moderate                                                                      [x] Moderate                 Progression, or side effects of treatment                            -test documents or independent  historians                        Prescription drug management                [x]  2 or more stable chronic illnesses                                    [] Independent interpretation of tests                              Minor surgery with identifiable risk                [] 1 undiagnosed new problem with uncertain prognosis    [] Discussion or management of test results                    elective major surgery                [] 1 acute illness with                systemic symptoms                                                                                                                                                              [] 1 acute complicated injury                                                                                                                                          Elective major surgery                                                                                                                                                                                                                                                                                                                                                                                                  5            [] 1 or more chronic illnesses with severe exacerbation,     [] Extensive(two from below)                                         [] High                                                                                                               [] Independent interpretation of results                         Drug therapy requiring intensive                                                                                                               []Discussion of management or test interpretation           monitoring                                                                                                                                                                                                        Decision to de-escalate care                 [] 1 acute or chronic illness or injury that poses a threat                                                                                               Decision regarding hospitalization                                                                                                                                                                                                                 CC: Dr. Kern

## 2023-01-31 ENCOUNTER — TELEPHONE (OUTPATIENT)
Dept: CARDIOLOGY | Facility: CLINIC | Age: 68
End: 2023-01-31
Payer: MEDICARE

## 2023-01-31 NOTE — TELEPHONE ENCOUNTER
----- Message from Gerogette May sent at 1/31/2023 11:48 AM CST -----  Please call this pt back about refills at [phone].

## 2023-02-01 ENCOUNTER — SPECIALTY PHARMACY (OUTPATIENT)
Dept: PHARMACY | Facility: CLINIC | Age: 68
End: 2023-02-01

## 2023-02-01 NOTE — TELEPHONE ENCOUNTER
Xolair 150mg & 75mg question request pending via CMM    Requested fax with clinical questions from plan

## 2023-02-03 ENCOUNTER — TELEPHONE (OUTPATIENT)
Dept: ALLERGY | Facility: CLINIC | Age: 68
End: 2023-02-03
Payer: MEDICARE

## 2023-02-03 ENCOUNTER — PATIENT MESSAGE (OUTPATIENT)
Dept: ALLERGY | Facility: CLINIC | Age: 68
End: 2023-02-03
Payer: MEDICARE

## 2023-02-03 DIAGNOSIS — R89.9 ABNORMAL LABORATORY TEST RESULT: Primary | ICD-10-CM

## 2023-02-03 NOTE — TELEPHONE ENCOUNTER
To meet the plan criteria for Xolair, the patient must have a IgE level between 30 IU/mL and 700 IU/mL. His last IgE on 9/7/22 was 1367 IU/mL. InBasket sent to see if patient can repeat the lab to assess if level has decreased to meet the criteria.

## 2023-02-07 DIAGNOSIS — Z00.00 ENCOUNTER FOR MEDICARE ANNUAL WELLNESS EXAM: ICD-10-CM

## 2023-02-09 DIAGNOSIS — Z00.00 ENCOUNTER FOR MEDICARE ANNUAL WELLNESS EXAM: ICD-10-CM

## 2023-02-13 ENCOUNTER — TELEPHONE (OUTPATIENT)
Dept: UROLOGY | Facility: CLINIC | Age: 68
End: 2023-02-13
Payer: MEDICARE

## 2023-02-13 NOTE — TELEPHONE ENCOUNTER
Attempted to contact pt at both numbers in Epic; no answer.  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

## 2023-02-14 ENCOUNTER — PATIENT MESSAGE (OUTPATIENT)
Dept: UROLOGY | Facility: CLINIC | Age: 68
End: 2023-02-14
Payer: MEDICARE

## 2023-02-21 ENCOUNTER — TELEPHONE (OUTPATIENT)
Dept: PULMONOLOGY | Facility: CLINIC | Age: 68
End: 2023-02-21
Payer: MEDICARE

## 2023-02-21 NOTE — TELEPHONE ENCOUNTER
Chronic Disease Management:   Called patient to confirm Pulmonary Disease Management appointment. No answer. No vm.

## 2023-02-22 ENCOUNTER — CLINICAL SUPPORT (OUTPATIENT)
Dept: PULMONOLOGY | Facility: CLINIC | Age: 68
End: 2023-02-22
Payer: MEDICARE

## 2023-02-22 VITALS
OXYGEN SATURATION: 96 % | HEART RATE: 90 BPM | RESPIRATION RATE: 16 BRPM | WEIGHT: 192.44 LBS | HEIGHT: 65 IN | BODY MASS INDEX: 32.06 KG/M2

## 2023-02-22 DIAGNOSIS — J45.998 UNCONTROLLED PERSISTENT ASTHMA: Primary | ICD-10-CM

## 2023-02-22 PROCEDURE — 99999 PR PBB SHADOW E&M-EST. PATIENT-LVL II: ICD-10-PCS | Mod: PBBFAC,,,

## 2023-02-22 PROCEDURE — 99999 PR PBB SHADOW E&M-EST. PATIENT-LVL II: CPT | Mod: PBBFAC,,,

## 2023-02-22 NOTE — PROGRESS NOTES
Pulmonary Disease Management  [facility]  Follow up Visit  Chronic Care Management    Crow Green  was seen 2/22/2023  9:20 AM in the Pulmonary Disease Management Clinic for evaluation, education, reinforcement of self management techniques and exacerbation action plan.    Sukh Sebastian    Past Medical History:   Diagnosis Date    Arthritis     Asthma     Atrial fibrillation     Atrial fibrillation with RVR 8/7/2019    CHF (congestive heart failure)     Chronic obstructive pulmonary disease 8/5/2020    Depression     Dermatomyositis     Diabetes mellitus, type 2 Diagnosed in 2000    Elevated PSA     Follow up 7/30/2020    Hypertension     Myocardial infarction     2017    Sleep apnea        Patient's Medications   New Prescriptions    No medications on file   Previous Medications    ALBUTEROL (PROVENTIL) 2.5 MG /3 ML (0.083 %) NEBULIZER SOLUTION    INHALE THE CONTENTS OF 1 VIAL VIA NEBULIZER EVERY 4 TO 6 HOURS AS NEEDED FOR WHEEZING OR FOR SHORTNESS OF BREATH    ALLOPURINOL (ZYLOPRIM) 100 MG TABLET    Take 1 tablet (100 mg total) by mouth once daily.    AMLODIPINE (NORVASC) 2.5 MG TABLET    Take 1 tablet (2.5 mg total) by mouth once daily.    APIXABAN (ELIQUIS) 5 MG TAB    TAKE 1 TABLET TWICE DAILY    ASPIRIN (ECOTRIN) 81 MG EC TABLET    Take 1 tablet (81 mg total) by mouth once daily.    ATORVASTATIN (LIPITOR) 40 MG TABLET    TAKE 1 TABLET BY MOUTH EVERY EVENING    AZATHIOPRINE (IMURAN) 50 MG TAB    Take 1 tablet (50 mg total) by mouth once daily.    BACLOFEN (LIORESAL) 10 MG TABLET    Take 1 tablet (10 mg total) by mouth daily as needed (back pain).    BLOOD SUGAR DIAGNOSTIC STRP    1 each by Misc.(Non-Drug; Combo Route) route 4 (four) times daily.    BLOOD-GLUCOSE METER (TRUE METRIX GLUCOSE METER) KIT    Use as instructed to test blood glucose meter daily.    BRINZOLAMIDE-BRIMONIDINE (SIMBRINZA) 1-0.2 % DRPS    Place 1 drop into both eyes 3 (three) times daily.    EPINEPHRINE (EPIPEN) 0.3  "MG/0.3 ML ATIN    Inject 0.3 mLs (0.3 mg total) into the muscle once. for 1 dose    FLUTICASONE-UMECLIDIN-VILANTER (TRELEGY ELLIPTA) 200-62.5-25 MCG INHALER    Inhale 1 puff into the lungs once daily.    FUROSEMIDE (LASIX) 40 MG TABLET    TAKE 2 TABLETS(80 MG) BY MOUTH TWICE DAILY    GABAPENTIN (NEURONTIN) 800 MG TABLET    Take 1 tablet (800 mg total) by mouth 3 (three) times daily.    INSULIN ASPART U-100 (NOVOLOG FLEXPEN U-100 INSULIN) 100 UNIT/ML (3 ML) INPN PEN    INJECT 10 UNITS SUBCUTANEOUSLY THREE TIMES DAILY WITH MEALS    JARDIANCE 25 MG TABLET    TAKE 1 TABLET EVERY DAY    LISINOPRIL (PRINIVIL,ZESTRIL) 30 MG TABLET    TAKE 1 TABLET EVERY DAY    LISINOPRIL 10 MG TABLET    TAKE 1 TABLET EVERY DAY    MICRO THIN LANCETS 33 GAUGE [facility] [place]        OMALIZUMAB (XOLAIR) 150 MG/ML INJECTION    Inject 2 mLs (300 mg total) into the skin every 14 (fourteen) days.    OMALIZUMAB (XOLAIR) 75 MG/0.5 ML INJECTION    Inject 0.5 mLs (75 mg total) into the skin every 14 (fourteen) days.    PANTOPRAZOLE (PROTONIX) 40 MG TABLET    Take 1 tablet (40 mg total) by mouth once daily.    PAPAVERINE-PHENTOLAMIN-ALPROST 150 MG-5 MG- 50 MCG SOLR    0.5 mLs by Intracavernosal route as needed (ED).    PEN NEEDLE, DIABETIC (BD ULTRA-FINE SHORT PEN NEEDLE) 31 GAUGE X 5/16" NDLE    1 each by Misc.(Non-Drug; Combo Route) route 3 (three) times daily.    RANOLAZINE (RANEXA) 1,000 MG TB12    Take 1 tablet (1,000 mg total) by mouth 2 (two) times daily.    SEMAGLUTIDE (OZEMPIC) 2 MG/DOSE (8 MG/3 ML) PNIJ    Inject 2 mg into the skin every 7 days.    SERTRALINE (ZOLOFT) 100 MG TABLET    TAKE 1 TABLET EVERY EVENING    TAMSULOSIN (FLOMAX) 0.4 MG CAP    Take 1 capsule (0.4 mg total) by mouth once daily.    TRAZODONE (DESYREL) 100 MG TABLET    TAKE 2 TABLETS EVERY EVENING    VIOS AEROSOL DELIVERY SYSTEM VIN    USE AS DIRESTED   Modified Medications    No medications on file   Discontinued Medications    No medications on file             Educational " assessment:   []            Good  [x]            Fair  []            Poor    Readiness to learn:   [x]            Good  []            Fair  []            Poor    Vision Status:   [x]            Good  []            Fair  []            Poor    Reading Ability:  [x]            Good  []            Fair  []            Poor    Knowledge of condition:   []            Good  [x]            Fair  []            Poor    Language Barriers:   []            Good  []            Fair  []            Poor  [x]            None    Cognitive/ Physical Barriers:   []            Good  []            Fair  []            Poor  [x]            None    Learning best by:                       [x]            Seeing  []            Hearing  []            Reading                         [x]            Doing    Describe any barrier /Limitation or financial implications of care choices identified     []            Financial  []            Emotional  []            Education  []            Vision/Hearing  []            Physical  [x]            None  []                TOPIC /CONTENT FOR TODAY:    [x]            MDI with or without spacer  [x]            Dry powder inhaler  []            Acapella   []           Peak Flow meter  [x]            COPD action plan  [x]            Nebulizer use  [x]            Oxygen use safety  [x]            Breathing and cough techniques  [x]            Energy conservation  [x]            Infection prevention  []           OTHER________________________        Learner:    [x]            Patient   []            Caregiver    Method:    [x]            Verbal explanation  [x]            Audio visual    [x]            Literature  [x]            Teach back      Evaluation:    [x]            Teach back  [x]            Demonstrate  [x]            Follow up phone call    []            2 weeks     [x]            4 weeks   [x]            PRN    Additional comments:   Patient was seen today to review respiratory medication purpose and  proper technique for use of inhalers. Patient practiced proper technique using MDI with valved holding chamber (spacer) and DPI inhalers. Patient demonstrated understanding. Literature was given to patient.    Patient is not taking a maintenance inhaler at this time. Provided education and instructions on use of Trelegy for maintenance therapy. Reminded patient to rinse mouth thoroughly after ICS use. Understanding was verbalized.      Asthma trigger checklist was verbally reviewed and literature given to patient.    Infection prevention was discussed. Patient's immunizations are current. Hand hygiene and cleaning of respiratory equipment was also discussed. Patient verbalized understanding.      COPD action plan was reviewed and literature was given to patient. Patient verbalized understanding.      Plan:  Monthly Pulmonary Disease Management Questionnaire  Follow-up PDM appointment scheduled for 5/23/23    Reinforce education  Meds: Trelegy, albuterol   DME Needs: [facility]   Action Plan: COPD, asthma   Immunization: Pneumococcal- current, Flu-current , Covid 19- current   Next Provider Visit: 7/25/23  Next Spirometry/CPFT: Not scheduled   Approximate time spent with patient: 35 mins

## 2023-02-22 NOTE — PATIENT INSTRUCTIONS
What is COPD?  COPD stands for chronic obstructive pulmonary disease. It means the airways in your lungs are blocked (obstructed). Because of this, it is hard to breathe. You may have trouble with daily activities because of shortness of breath. Over time the shortness of breath usually worsens making it more and more difficult to take care of yourself and take part in activities. Chronic bronchitis and emphysema are two common types of COPD.  What happens in chronic bronchitis?  The cells in the airways make more mucus than normal. The mucus builds up, narrowing the airways. This means less air travels into and out of the lungs. The lining of the airways may also become inflamed (swollen) and causes the airways to narrow even more.            What happens in emphysema?  The small airways are damaged and lose their stretchiness. The airways collapse when you exhale, causing air to get trapped in the air sacs. This means that less oxygen enters the blood vessels and less oxygen is delivered to all of the cells of your body. This makes it hard to breathe.         Damage to cilia  Cilia are small hairs that line and protect the airways. Smoking damages the cilia. Damaged cilia can't sweep mucus and particles away. Some of the cilia are destroyed. This damage worsens COPD.      How did I get COPD?  Most people get COPD from smoking. Cigarette smoke damages lungs, which can develop into COPD over many years.  How COPD affects you  COPD makes you work harder to breathe. Air may get trapped in the lungs, which prevents your lungs from filling completely with fresh oxygen-filled air when you inhale (breathe in). It's harder to take deep breaths especially when you are active and start breathing faster. Over time, your lungs may become enlarged filling the lung with air that does not transfer oxygen into the blood. These problems cause you to have shortness of breath (also called dyspnea). Wheezing (hoarse, whistling  breathing), chronic cough, and fatigue (feeling tired and worn out) are also common.   © 4198-8771 [employer] [address] [facility], [place] [address]. All rights reserved. This information is not intended as a substitute for professional medical care. Always follow your healthcare professional's instructions.           Understanding Asthma         Asthma is a long-term (chronic) lung condition. It involves the airways (bronchial tubes). It happens when a trigger causes your airways to swell and become narrow. The muscles around your airways start to tighten. When your airways start to narrow, air can't move in and out of your lungs very well. Mucus also builds up along the airways. This makes it even harder to move air in and out of your lungs.  Experts are not exactly sure what causes asthma. It may be caused by a mix of inherited and environmental factors. People with asthma may have no symptoms until they are exposed to an allergen or trigger.  Healthy lungs  Inside your lungs there are branching airways made of stretchy tissue. Each airway is wrapped with bands of muscle. The airways are smaller as they go deeper into the lungs. The smallest airways end in clusters of tiny balloon-like air sacs (alveoli). These clusters are surrounded by blood vessels. When you breathe in (inhale), air enters the lungs. It travels down through the airways until it reaches the air sacs. When you breathe out (exhale), air travels up through the airways and out of the lungs. The airways make mucus that traps particles you breathe in. Normally, the mucus is then swept out of the lungs by tiny hairs (cilia) that line the airways. The mucus is swallowed or coughed up during your day.    What the lungs do  The air you inhale contains oxygen. When oxygen reaches the air sacs, it passes into the blood vessels around the sacs. Your blood then sends oxygen to all of your cells. As you exhale, carbon dioxide is removed in  a similar way from the blood in the air sacs, and from your body.    When you have asthma  People with asthma have very sensitive airways. This means the airways react to certain things called triggers. Triggers can include pollen, dust, or smoke. Triggers cause inflammation. This makes the airways swell and become narrow. This is a long-lasting (chronic) problem. Your airways may not always be narrow enough so that you notice breathing problems.  Symptoms of chronic inflammation include:   Coughing (chronic)   A feeling of tightness in your chest   Feeling short of breath   Wheezing (a whistling noise, especially when breathing out)   Low energy or feeling tired   In some people, over time chronic mild inflammation can lead to lasting (permanent) scarring of airways and loss of lung function.    Asthma flare-ups  When sensitive airways are irritated by a trigger, the muscles around the airways tighten. The lining of the airways swells. Thick, sticky mucus increases and partly clogs the airways. All of this makes it harder to breathe.  Symptoms of flare-ups may include:  Coughing, especially at night. You may not be able to sleep because of coughing.   Getting tired or out of breath easily   Wheezing   Chest tightness   Faster breathing when at rest   Flare-ups can be life-threatening. In a severe flare-up, the muscle tightening, swelling, and mucus are worse. Its very hard to breathe. Your body can't get enough oxygen and can't remove carbon dioxide. Waste gas is trapped in the alveoli. Gas exchange cant occur. The body is not getting enough oxygen. Without oxygen, body tissues, especially brain tissue, begin to get damaged. If this goes on for long, it can lead to severe brain damage or death.  Call 911 (or have someone call for you) if you have any of these symptoms and they are not relieved right away by taking your quick-relief medicine as prescribed:  Trouble breathing   Feeling too short of breath to talk  or walk   Lips or fingers turning blue   Feeling lightheaded or dizzy, as though you are about to pass out   Peak flow less than 50% of your personal best, if you use a peak flow meter     Managing your asthma  Asthma is a long-term condition. So its important to work with your healthcare provider to manage it. If you have asthma, you can prevent flare-ups. Develop an asthma action plan with your healthcare provider. It can help control your asthma and manage your symptoms. An asthma action plan also tells you and your family or friends what to do if your asthma flares up or gets worse.  Take your medicine as prescribed. Also learn about your asthma triggers. Knowing what causes your asthma to flare up in the first place can help you prevent future breathing problems.  If you smoke, get help to quit.   Asthma Trigger Checklist  Allergens, irritants, and other things may trigger your asthma. Check the box next to each of your triggers. After each trigger is a list of ways to avoid it.   Dust mites. Dust mites live in mattresses, bedding, carpets, curtains, and indoor dust.  To kill dust mites, wash bedding in hot water (130°F) each week.  Cover mattress and pillows with special dust-mite-proof cases.  Don't use upholstered furniture like sofas or chairs in the bedroom.  Use allergy-proof filters for air conditioners and furnaces. Replace or clean them as instructed.  If you can, replace carpeting with wood or tile tre, especially in the bedroom.  Animals. Animals with fur or feathers shed dander (allergens).  It's best to choose a pet that doesn't have fur or feathers, such as a fish or a reptile.  If you have pets, keep them off your bed and out of your bedroom.  Wash your hands and clothes after handling pets.    Mold. Mold grows in damp places, such as bathrooms, basements, and closets.  Ask someone to clean damp areas in your home every week. Or try wearing a face mask while you clean.  Run an exhaust fan  while bathing. Or leave a window open in the bathroom.  Repair water leaks in or around your home.  Have someone else cut grass or rake leaves, if possible.  Don't use vaporizers or humidifiers. They encourage mold growth.    Pollen. Pollen from trees, grasses, and weeds is a common allergen. (Flower pollens are generally not a problem).  Try to learn what types of pollen affect you most. Pollen levels vary depending on the plant, the season, and the time of day.  If possible, use air conditioning instead of opening the windows in your home or car.  Have someone else do yard work, if possible.    Cockroaches. Roaches are found in many homes and produce allergens.  Keep your kitchen clean and dry. A leaky faucet or drain can attract roaches.  Remove garbage from your home daily.  Store food in tightly sealed containers. Wash dishes as soon as they are used.  Use bait stations or traps to control roaches. Avoid using chemical sprays.    Smoke. Smoke may be from cigarettes, cigars, pipes, incense or candles, barbecues or grills, and fireplaces.  Don't smoke. And don't let people smoke in your home or car.  When you travel, ask for nonsmoking rental cars and hotel rooms.  Avoid fireplaces and wood stoves. If you can't, sit away from them. Make sure the smoke is directed outside.  Don't burn incense or use candles.  Move away from smoky outdoor cooking grills.    Smog.  Smog is from car exhaust and other pollution.  Read or listen to local air-quality reports. These let you know when air quality is poor.  Stay indoors as much as you can on smoggy days. If possible, use air conditioning instead of opening the windows.  In your car, set air conditioning to recirculate air, so less pollution gets in.    Strong odors. These include air fresheners, deodorizers, and cleaning products; perfume, deodorant, and other beauty products; incense and candles; and insect sprays and other sprays.  Use scent-free products like deodorant  or body lotion.  Avoid using cleaning products with bleach and ammonia. Make your own cleaning solution with white vinegar, baking soda, or mild dish soap.  Use exhaust fans while cooking. Or open a window, if possible.   Avoid perfumes, air fresheners, potpourri, and other scented products.          Other irritants. These include dust, aerosol sprays, and powders.  Wear a face mask while doing tasks like sanding, dusting, sweeping, and yard work. Open doors and windows if working indoors.  Use pump spray bottles instead of aerosols.  Pour liquid  onto a rag or cloth instead of spraying them.    Weather. Weather conditions can trigger symptoms or make them worse.  Watch for very high or low temperatures, very humid conditions, or a lot of wind, as these conditions can make symptoms worse.  Limit outdoor activity during the type of weather that affects you.  Wear a scarf over your mouth and nose in cold weather.    Colds, flu, and sinus infections. Upper respiratory infections can trigger asthma.  Wash your hands often with soap and warm water or use a hand  containing alcohol.  Get a yearly flu shot. And ask if you should get a pneumonia vaccine.  Take care of your general health. Get plenty of sleep. And eat a variety of healthy foods.    Food additives. Food additives can trigger asthma flare-ups in some people.  Check food labels for sulfites or other similar ingredients. These are often found in foods such as wine, beer, and dried fruits.  Avoid foods that contain these additives.    Medicine. Aspirin, NSAIDS like ibuprofen and naproxen, and heart medicines like beta-blockers may be triggers.  Tell your health care provider if you think certain medicines trigger symptoms.   Be sure to read the labels on over-the-counter medicines. They may have ingredients that cause symptoms for you.   , Emotions. Laughing, crying, or feeling excited are triggers for some people.   To help you stay calm: Try  breathing in slowly through your nose for a count of 2 seconds. Close your lips and breathe out for 4 seconds. Repeat.  Try to focus on a soothing image in your mind. This will help relax you and calm your breathing.  Remember to take your daily controller medicines. When you're upset or under stress, it's easy to forget.    Exercise. For some people, exercise can trigger symptoms. Don't let this keep you from being active.   If you have not been exercising regularly, start slow and work up gradually.  Take all of your medicines as prescribed.  If you use quick-relief medicine, make sure you have it with you when you exercise.  Stop if you have any symptoms. Make sure you talk with your provider about these symptoms.  © 7174-9020 [employer]. [address] [place] [address], [place] [address]. All rights reserved. This information is not intended as a substitute for professional medical care. Always follow your healthcare professional's instructions.              Coronavirus Disease 2019 (COVID-19): Prevention  The best prevention is to have no contact with the SARS-CoV-2 virus. The FDA has approved vaccines to prevent COVID-19 in people older than 18 (one vaccine has been approved for people as young as 16). Pregnant or breastfeeding people may choose to be vaccinated. Expert groups, including ACOG and the CDC, advise pregnant or breastfeeding people to talk with their healthcare provider about the vaccine.   The vaccines are being rolled out to the public in phases. Check your local health department for your community's roll-out plans. The vaccines are given as a shot (injection) in the arm muscle. A1-dose or 2-dose vaccine may be given. If you get the 2-dose vaccine, the second dose is given several weeks after the first.   During a pandemic, it's especially important to keep up on recommended vaccines for other illnesses. This is more true if you're at higher risk for severe illness from COVID-19, the flu,  "or pneumonia. This includes older adults and those who have long-term (chronic) health conditions. Getting a yearly flu vaccine is advised for everyone 6 months old and older, with rare exceptions. Health experts advise the flu vaccine to protect you and others. The flu vaccine helps protect those at high-risk for serious illness, and lowers the strain on hospitals during the COVID-19 pandemic.   Canceling travel and other outings  Stay informed about COVID-19 in your area. Follow local instructions about being in public. Be aware of events in your community that may be postponed or canceled, such as school and sporting events. You may be advised not to attend public gatherings.    You will be advised to stay at least 6 feet from others as much as possible. This is called "social distancing." You may be advised to stay at home and isolate yourself as much as possible if COVID-19 is in your area. You may hear terms such as "self isolate, "quarantine," stay at home, and shelter in place.   The CDC advises that people should not travel to areas where there are COVID-19 outbreaks right now for any reason that is not urgent. For the most current CDC travel advisories, visit the CDC website at www.cdc.gov/coronavirus/2019-ncov/travelers. Dont go on cruises or do non-essential travel right now.      When you are at home  Wash your hands often. Use soap and clean, running water for at least 20 seconds.   If you don't have access to soap and water, use an alcohol-based hand  often. Make sure it has at least 60% alcohol.   Don't touch your eyes, nose, or mouth unless you have clean hands.   Dont kiss someone who is sick.   If you need to cough or sneeze, do it into a tissue. Then throw the tissue into the trash. If you don't have tissues, cough or sneeze into the bend of your elbow.   When possible, don't touch "high-touch" shared surfaces such as doorknobs and handles, cabinet handles, and light switches. " "  Clean frequently-touched home surfaces often with disinfectant. This includes desk surfaces, printers, phones, kitchen counters, tables, fridge door handle, bathroom surfaces, and any soiled surface. Closely follow disinfectant label instructions. Go to the CDCs detailed cleaning website at www.cdc.gov/coronavirus/2019-ncov/prepare/cleaning-disinfection.html.   Check your home supplies. Consider keeping a 2-week supply of medicines, food, and other needed household items.   Make a plan for childcare, work, and ways to stay in touch with others. Know who will help you if you get sick.   Don't be around people who are sick.   There is no evidence right now that animals spread SARS-CoV-2. But it's always a good idea to wash your hands after touching any animals. Don't touch animals that may be sick.   Dont share eating or drinking utensils with sick people.     If you leave home       Stay at least 6 feet away from all people. This is called "social distancing."   When possible, don't touch "high-touch" public surfaces such as doorknobs and handles, cabinet handles, and light switches. If you touch these surfaces, try to clean them first with a disinfecting wipe. Or touch them using a tissue or paper towel.   Use an alcohol-based hand  often. Make sure it has at least 60% alcohol.   Don't touch your eyes, nose, or mouth unless you have clean hands.   If you need to cough or sneeze, do it into a tissue. Then throw the tissue into the trash. If you don't have tissues, cough or sneeze into the bend of your elbow.   Don't attend public gatherings if possible. If you do attend public gatherings, follow social distancing rules. Don't share food or personal items such as water bottles.   The CDC advises wearing a cloth face mask with two or more layers of washable, breathable fabric while in public or when indoors with people who don't live with you. Or you can wear a disposable paper mask with a cloth mask over " it. You can make a cloth face mask of your own. . The CDC has instructions on how to make a mask. Wear the mask so that it covers both your nose and mouth.   The CDC advises all people over age 2 to wear cloth face masks in public settings when around people outside of their household, especially when it's hard to socially distance. For example, wear a mask in populated places such as public transit, public protests and marches, and crowded stores, bars, and restaurants. Cloth masks may help prevent people who have COVID-19 form spreading the virus to others. Cloth masks are most likely to reduce COVID-19 spread when masks are widely used by people who are out in the public.   Certain people should not wear a face mask. This includes:   Children younger than 2 years old   Anyone with a health, developmental, or mental health condition that can be made worse by wearing a mask   Anyone who is unconscious or unable to remove the face covering without help. See the CDC's guidance on who should not wear a face mask.     If you are at a work site  If you haven't been fully vaccinated against COVID-19 and are exposed , go home and stay home if you feel sick in any way.   Tell your supervisor if you are well but live with someone who has COVID-19.   Stay at least 6 feet away from all people.   Don't shake hands with anyone.   Don't attend in-person meetings, or limit how many you attend. Meet over phone or video if possible.   Don't use other people's desks, phones, equipment, or offices, if possible.   Wash your hands often. Use soap and clean, running water for at least 20 seconds.   If you don't have access to soap and water, use an alcohol-based hand  often. Make sure it has at least 60% alcohol.   Don't touch your eyes, nose, or mouth unless you have clean hands.   The CDC advises wearing a cloth face mask with two or more layers of washable, breathable fabric while in public or when indoors with people who  "don't live with you. You can make a cloth face mask of your own. . The CDC has instructions on how to make a mask. Wear the mask so that it covers both your nose and mouth. .   When possible, don't touch "high-touch" public surfaces such as doorknobs and handles, cabinet handles, and light switches. If you touch these surfaces, clean them first with a disinfecting wipe. Or touch them using a tissue or paper towel.   Use office og one person at a time.   Consider not having office coffee or tea, or group foods.   Dont have meals in groups.   Clean work surfaces often with disinfectant. This includes desk surfaces, photocopier, printer, phones, kitchen counters, fridge door handle, bathroom surfaces, and others.   Dont touch other peoples personal work tools, such as phones, keyboards, pens, and other items.   Dont touch other peoples eating or drinking utensils.   If you need to cough or sneeze, do it into a tissue. Then throw the tissue into the trash. If you don't have tissues, cough or sneeze into the bend of your elbow.     If you have been exposed to a person with COVID-19   If you've been fully vaccinated against COVID-19, you don't need to stay home away from others if you've been exposed.   If you haven't been fully vaccinated and d have been exposed to someone who is suspected of having COVID-19 or has tested positive for it:   Call your healthcare provider and follow all instructions. Stay home away from others and monitor your health. This is called quarantine. The CDC advises that you quarantine to help prevent the spread of COVID-19 once you've been exposed to someone with the infection. The CDC still supports quarantine for 14 days after exposure, but they recognize the hardship for many who need to work. The CDC now recommends two options for how long quarantine should last. If you have been exposed but don't have symptoms, you can stop quarantine:   10 days after exposure if you don't get a " diagnostic (viral) test, or   7 days after getting a negative viral test result.   Take your temperature every morning and evening for 14 days after exposure. This is to check for fever. Keep a record of the readings. If possible, stay away from others, especially those who are at higher risk of getting very sick from COVID-19.   Watch for symptoms of the virus such as cough or trouble breathing. If you develop any symptoms, isolate yourself right away. Call your provider first before going to any clinic or hospital. See the CDC's symptom .   Your limits are different if you've had COVID-19 in the last 3 months but are fully recovered without symptoms and you have been exposed to someone with COVID-19. If you are symptom-free, you don't need to quarantine or be retested. The CDC doesn't recommend retesting unless you have symptoms of COVID-19 and your new symptoms can't be linked to another illness. Contact your healthcare provider if you have any questions. If you develop symptoms, stay home. If you had COVID-19 more than 3 months ago and have been exposed again, treat it like you've never had COVID-19 and stay home, limit your contact with others, call your provider, and monitor for symptoms.     When to call your healthcare provider  Call your healthcare provider if you think you have COVID-19 symptoms. These can include fever, cough, and trouble breathing. They may also include body aches, headache, chills or repeated shaking with chills, sore throat, loss of taste or smell, or diarrhea. Dont go to a healthcare facility before speaking to a healthcare provider.  ACTION PLAN    GREEN ZONE  My sputum is clear/white/usual color and easily cleared.  My breathing is no harder than usual.  I can do my usual activities.  I can think clearly.   Take your usual medicines, including oxygen, as you are told to do so by your health care provider.   YELLOW ZONE  My sputum has change (color, thickness, amount).  I am  more short of breath than usual.  I cough or wheeze more.  I weigh more and my legs/feet swell.  I cannot do my usual activities without resting.   Call your health care provider. You will probably be told to begin taking an antibiotic and prednisone. Have your pharmacy phone number available.   RED ZONE  I cannot cough out my sputum.  I am much more short of breath than normal.  I need to sit up to breathe  I cannot do my usual activities.  I am unable to speak more than one or two words at a time.  I am confused.   Call your health care provider. You may be asked to come in to be seen, told to go to the emergency room, or told to call 9-1-1.

## 2023-03-01 NOTE — TELEPHONE ENCOUNTER
Outgoing call to pt regarding labs - both phones in [facility] had busy tone    Outgoing call to pt's sister in contact number ( Gretchen [phone]) - person stated wrong number    Outgoing call to pt's relative (Erica [phone]), spoke to , Crow's uncle, relayed, relayed message to Ms. Maldonado or Mr. Maria to contact Dr. Hernandez's office to schedule appt for labs.

## 2023-03-03 ENCOUNTER — OFFICE VISIT (OUTPATIENT)
Dept: OPHTHALMOLOGY | Facility: CLINIC | Age: 68
End: 2023-03-03
Payer: MEDICARE

## 2023-03-03 DIAGNOSIS — Z79.4 TYPE 2 DIABETES MELLITUS WITH BOTH EYES AFFECTED BY MILD NONPROLIFERATIVE RETINOPATHY WITHOUT MACULAR EDEMA, WITH LONG-TERM CURRENT USE OF INSULIN: ICD-10-CM

## 2023-03-03 DIAGNOSIS — E11.3293 TYPE 2 DIABETES MELLITUS WITH BOTH EYES AFFECTED BY MILD NONPROLIFERATIVE RETINOPATHY WITHOUT MACULAR EDEMA, WITH LONG-TERM CURRENT USE OF INSULIN: ICD-10-CM

## 2023-03-03 DIAGNOSIS — H40.1132 PRIMARY OPEN ANGLE GLAUCOMA (POAG) OF BOTH EYES, MODERATE STAGE: Primary | ICD-10-CM

## 2023-03-03 PROCEDURE — 99213 OFFICE O/P EST LOW 20 MIN: CPT | Mod: S$GLB,,, | Performed by: OPHTHALMOLOGY

## 2023-03-03 PROCEDURE — 4010F PR ACE/ARB THEARPY RXD/TAKEN: ICD-10-PCS | Mod: CPTII,S$GLB,, | Performed by: OPHTHALMOLOGY

## 2023-03-03 PROCEDURE — 1160F RVW MEDS BY RX/DR IN RCRD: CPT | Mod: CPTII,S$GLB,, | Performed by: OPHTHALMOLOGY

## 2023-03-03 PROCEDURE — 1160F PR REVIEW ALL MEDS BY PRESCRIBER/CLIN PHARMACIST DOCUMENTED: ICD-10-PCS | Mod: CPTII,S$GLB,, | Performed by: OPHTHALMOLOGY

## 2023-03-03 PROCEDURE — 99213 PR OFFICE/OUTPT VISIT, EST, LEVL III, 20-29 MIN: ICD-10-PCS | Mod: S$GLB,,, | Performed by: OPHTHALMOLOGY

## 2023-03-03 PROCEDURE — 1159F MED LIST DOCD IN RCRD: CPT | Mod: CPTII,S$GLB,, | Performed by: OPHTHALMOLOGY

## 2023-03-03 PROCEDURE — 3044F HG A1C LEVEL LT 7.0%: CPT | Mod: CPTII,S$GLB,, | Performed by: OPHTHALMOLOGY

## 2023-03-03 PROCEDURE — 99999 PR PBB SHADOW E&M-EST. PATIENT-LVL III: CPT | Mod: PBBFAC,,, | Performed by: OPHTHALMOLOGY

## 2023-03-03 PROCEDURE — 4010F ACE/ARB THERAPY RXD/TAKEN: CPT | Mod: CPTII,S$GLB,, | Performed by: OPHTHALMOLOGY

## 2023-03-03 PROCEDURE — 1159F PR MEDICATION LIST DOCUMENTED IN MEDICAL RECORD: ICD-10-PCS | Mod: CPTII,S$GLB,, | Performed by: OPHTHALMOLOGY

## 2023-03-03 PROCEDURE — 99999 PR PBB SHADOW E&M-EST. PATIENT-LVL III: ICD-10-PCS | Mod: PBBFAC,,, | Performed by: OPHTHALMOLOGY

## 2023-03-03 PROCEDURE — 3044F PR MOST RECENT HEMOGLOBIN A1C LEVEL <7.0%: ICD-10-PCS | Mod: CPTII,S$GLB,, | Performed by: OPHTHALMOLOGY

## 2023-03-03 NOTE — PROGRESS NOTES
HPI     Glaucoma            Comments: Patient reports for 3 month IOP check. Never started simbrinza.   Denies pain or irritation at this time. Patient reports of visual   stability since previous visit.             Comments    Last saw DNL 5/2020     1. DM with BDR and ME   2. Cataracts OU   3. COAG OU  Gcl: 10/2022 --- 28/33    Simbrinza BID OU (never started)  Latanoprost qd OU            Last edited by Gio Mitchell MD on 3/3/2023 10:16 AM.            Assessment /Plan     For exam results, see Encounter Report.      ICD-10-CM ICD-9-CM    1. Primary open angle glaucoma (POAG) of both eyes, moderate stage  H40.1132 365.11 See below      365.72       2. Type 2 diabetes mellitus with both eyes affected by mild nonproliferative retinopathy without macular edema, with long-term current use of insulin  E11.3293 250.50 Not due for dilation at this time     Z79.4 362.04      V58.67           Okay to follow without Simbrinza at this time based on IOP today and thick CCT   RETURN TO CLINIC 3 month IOP     Latanoprost QHS OU

## 2023-03-13 ENCOUNTER — TELEPHONE (OUTPATIENT)
Dept: NEUROSURGERY | Facility: CLINIC | Age: 68
End: 2023-03-13
Payer: MEDICARE

## 2023-03-13 ENCOUNTER — HOSPITAL ENCOUNTER (EMERGENCY)
Facility: HOSPITAL | Age: 68
Discharge: HOME | End: 2023-03-13
Attending: EMERGENCY MEDICINE
Payer: MEDICARE

## 2023-03-13 VITALS
WEIGHT: 184 LBS | OXYGEN SATURATION: 98 % | DIASTOLIC BLOOD PRESSURE: 92 MMHG | BODY MASS INDEX: 30.62 KG/M2 | HEART RATE: 86 BPM | TEMPERATURE: 98 F | RESPIRATION RATE: 18 BRPM | SYSTOLIC BLOOD PRESSURE: 173 MMHG

## 2023-03-13 DIAGNOSIS — G89.29 CHRONIC BILATERAL LOW BACK PAIN WITH BILATERAL SCIATICA: Primary | ICD-10-CM

## 2023-03-13 DIAGNOSIS — M54.41 CHRONIC BILATERAL LOW BACK PAIN WITH BILATERAL SCIATICA: Primary | ICD-10-CM

## 2023-03-13 DIAGNOSIS — M54.42 CHRONIC BILATERAL LOW BACK PAIN WITH BILATERAL SCIATICA: Primary | ICD-10-CM

## 2023-03-13 PROBLEM — Z00.00 ENCOUNTER FOR PREVENTIVE HEALTH EXAMINATION: Status: RESOLVED | Noted: 2022-12-07 | Resolved: 2023-03-13

## 2023-03-13 PROCEDURE — 25000003 PHARM REV CODE 250: Performed by: EMERGENCY MEDICINE

## 2023-03-13 PROCEDURE — 99284 EMERGENCY DEPT VISIT MOD MDM: CPT | Mod: 25

## 2023-03-13 RX ORDER — HYDROCODONE BITARTRATE AND ACETAMINOPHEN 5; 325 MG/1; MG/1
1 TABLET ORAL
Status: COMPLETED | OUTPATIENT
Start: 2023-03-13 | End: 2023-03-13

## 2023-03-13 RX ORDER — NAPROXEN 500 MG/1
500 TABLET ORAL 2 TIMES DAILY WITH MEALS
Qty: 20 TABLET | Refills: 0 | Status: SHIPPED | OUTPATIENT
Start: 2023-03-13

## 2023-03-13 RX ORDER — CYCLOBENZAPRINE HCL 10 MG
10 TABLET ORAL 3 TIMES DAILY PRN
Qty: 9 TABLET | Refills: 0 | Status: SHIPPED | OUTPATIENT
Start: 2023-03-13 | End: 2023-03-18

## 2023-03-13 RX ORDER — TIZANIDINE 4 MG/1
4 TABLET ORAL ONCE
Status: COMPLETED | OUTPATIENT
Start: 2023-03-13 | End: 2023-03-13

## 2023-03-13 RX ORDER — NAPROXEN 500 MG/1
500 TABLET ORAL
Status: COMPLETED | OUTPATIENT
Start: 2023-03-13 | End: 2023-03-13

## 2023-03-13 RX ORDER — HYDROCODONE BITARTRATE AND ACETAMINOPHEN 10; 325 MG/1; MG/1
1 TABLET ORAL EVERY 4 HOURS PRN
Qty: 12 TABLET | Refills: 0 | OUTPATIENT
Start: 2023-03-13 | End: 2024-01-18

## 2023-03-13 RX ORDER — HYDROCODONE BITARTRATE AND ACETAMINOPHEN 10; 325 MG/1; MG/1
1 TABLET ORAL
Status: DISCONTINUED | OUTPATIENT
Start: 2023-03-13 | End: 2023-03-13

## 2023-03-13 RX ORDER — GABAPENTIN 400 MG/1
800 CAPSULE ORAL ONCE
Status: COMPLETED | OUTPATIENT
Start: 2023-03-13 | End: 2023-03-13

## 2023-03-13 RX ADMIN — GABAPENTIN 800 MG: 400 CAPSULE ORAL at 09:03

## 2023-03-13 RX ADMIN — NAPROXEN 500 MG: 500 TABLET ORAL at 08:03

## 2023-03-13 RX ADMIN — HYDROCODONE BITARTRATE AND ACETAMINOPHEN 1 TABLET: 5; 325 TABLET ORAL at 08:03

## 2023-03-13 RX ADMIN — TIZANIDINE 4 MG: 4 TABLET ORAL at 08:03

## 2023-03-13 NOTE — TELEPHONE ENCOUNTER
Contacted patient regarding referral with Neurosurgery. Patient states no surgeries within last 2 years, nor was pain/injury caused by any accident that would be involved in any type of future litigation.    Anya Marcelino RN

## 2023-03-13 NOTE — ED PROVIDER NOTES
SCRIBE #1 NOTE: I, Carrie Collins, am scribing for, and in the presence of, Jorgito Zapata MD. I have scribed the entire note.      History      Chief Complaint   Patient presents with    Back Pain     Lower back pain x several weeks. Needs surgery but pt is refusing per [facility].        Review of patient's allergies indicates:   Allergen Reactions    Protamine Hives     Urticaria, possible upper airway swelling        HPI   HPI    3/13/2023, 9:13 AM   History obtained from the patient and EMS      History of Present Illness: Crow Green is a 67 y.o. male patient with a PMHx of asthma, artrial fibrillation, CHF, COPD, DM2, HTN, and MI who presents to the Emergency Department for chronic lower back pain that radiates down his BLE which onset gradually 3 weeks PTA. Per EMS, the pt needs surgery for his pain, but the pt does not want to have surgery. Symptoms are constant and moderate in severity. Pt's pain is worse with movement. Associated sxs include BLE pain radiating from his back. Patient denies any numbness, weakness, CP, SOB, fever, chills, and all other sxs at this time. No prior Tx reported. No further complaints or concerns at this time.         Arrival mode: EMS    PCP: Cm Polanco MD       Past Medical History:  Past Medical History:   Diagnosis Date    Arthritis     Asthma     Atrial fibrillation     Atrial fibrillation with RVR 8/7/2019    CHF (congestive heart failure)     Chronic obstructive pulmonary disease 8/5/2020    Depression     Dermatomyositis     Diabetes mellitus, type 2 Diagnosed in 2000    Elevated PSA     Follow up 7/30/2020    Hypertension     Myocardial infarction     2017    Sleep apnea        Past Surgical History:  Past Surgical History:   Procedure Laterality Date    ABLATION OF ARRHYTHMOGENIC FOCUS FOR ATRIAL FIBRILLATION N/A 2/27/2020    Procedure: Ablation atrial fibrillation;  Surgeon: Gio Brown MD;  [facility];  Service: Cardiology;  Laterality:  N/A;  afib, DAMIEN (cx if SR), PVI, PITO, anes, MB, 3 Prep    CHOLECYSTECTOMY      CLOSURE OF WOUND Left 7/1/2020    Procedure: CLOSURE, WOUND;  Surgeon: Barb Veras DPM;  Location: [facility];  Service: Podiatry;  Laterality: Left;    COLONOSCOPY N/A 3/4/2022    Procedure: COLONOSCOPY;  Surgeon: Yolis Freitas MD;  Location: [facility];  Service: Endoscopy;  Laterality: N/A;    ESOPHAGOGASTRODUODENOSCOPY N/A 3/4/2022    Procedure: EGD (ESOPHAGOGASTRODUODENOSCOPY);  Surgeon: Yolis Freitas MD;  Location: [facility] ENDO;  Service: Endoscopy;  Laterality: N/A;    INJECTION OF ANESTHETIC AGENT INTO SACROILIAC JOINT Left 3/24/2021    Procedure: Left BLOCK, SACROILIAC JOINT and bilateral rhomboid TPI;  Surgeon: Dillan Tsai MD;  Location: [facility]IN [facility]GT;  Service: Pain Management;  Laterality: Left;    INTRALUMINAL GASTROINTESTINAL TRACT IMAGING VIA CAPSULE N/A 3/22/2022    Procedure: IMAGING PROCEDURE, GI TRACT, INTRALUMINAL, VIA CAPSULE;  Surgeon: Justice Martinez RN;  Location: [facility];  Service: Endoscopy;  Laterality: N/A;    REVERSE TOTAL SHOULDER ARTHROPLASTY Left 1/10/2022    Procedure: ARTHROPLASTY, SHOULDER, TOTAL, REVERSE;  Surgeon: Anthony Alvarado MD;  Location: [facility];  Service: Orthopedics;  Laterality: Left;  left reverse total shoulder arthroplasty     SELECTIVE INJECTION OF ANESTHETIC AGENT AROUND LUMBAR SPINAL NERVE ROOT BY TRANSFORAMINAL APPROACH Left 6/11/2020    Procedure: BLOCK, SPINAL NERVE ROOT, LUMBAR, SELECTIVE, TRANSFORAMINAL APPROACH Left L4-5, L5-S1 TFESI with RN IV sedation;  Surgeon: Dillan Tsai MD;  Location: [facility]IN [facility];  Service: Pain Management;  Laterality: Left;    TOE AMPUTATION Left 6/29/2020    Procedure: AMPUTATION, TOE;  Surgeon: Barb Veras DPM;  Location: [facility];  Service: Podiatry;  Laterality: Left;  great toe         Family History:  Family History   Problem Relation Age of Onset    Hypertension Mother     Glaucoma Mother     Cataracts Mother     Diabetes  Mother     Cataracts Father     Stroke Father     Glaucoma Sister         x3    Cataracts Sister         x3    Diabetes Sister         x1    Stroke Sister         x1    No Known Problems Brother     No Known Problems Daughter     No Known Problems Son     Glaucoma Maternal Aunt     Diabetes Maternal Aunt     Cancer Maternal Grandfather         lung (smoker)    Prostate cancer Neg Hx     Heart disease Neg Hx     Kidney disease Neg Hx        Social History:  Social History     Tobacco Use    Smoking status: Never    Smokeless tobacco: Never   Substance and Sexual Activity    Alcohol use: Yes     Comment: rare, maybe holidays    Drug use: Not Currently     Types: Cocaine    Sexual activity: Not Currently     Partners: Female       ROS   Review of Systems   Constitutional:  Negative for chills and fever.   HENT:  Negative for sore throat.    Respiratory:  Negative for shortness of breath.    Cardiovascular:  Negative for chest pain.   Gastrointestinal:  Negative for nausea.   Genitourinary:  Negative for dysuria.   Musculoskeletal:  Positive for back pain (lower and radiating down the BLE) and myalgias (BLE pain radiating from the lower back).   Skin:  Negative for rash.   Neurological:  Negative for weakness and numbness.   Hematological:  Does not bruise/bleed easily.   All other systems reviewed and are negative.    Physical Exam      Initial Vitals [03/13/23 0616]   BP Pulse Resp Temp SpO2   (!) 162/98 80 16 97.8 °F (36.6 °C) 98 %      MAP       --          Physical Exam  Nursing Notes and Vital Signs Reviewed.  Constitutional: Patient is in no acute distress. Well-developed and well-nourished.  Head: Atraumatic. Normocephalic.  Eyes: PERRL. EOM intact. Conjunctivae are not pale. No scleral icterus.  ENT: Mucous membranes are moist. Oropharynx is clear and symmetric.    Neck: Supple. Full ROM. No lymphadenopathy.  Cardiovascular: Regular rate. Regular rhythm. No murmurs, rubs, or gallops. Distal pulses are 2+ and  symmetric.  Pulmonary/Chest: No respiratory distress. Clear to auscultation bilaterally. No wheezing or rales.  Abdominal: Soft and non-distended.  There is no tenderness.  No rebound, guarding, or rigidity.   Musculoskeletal: Moves all extremities. No obvious deformities. No edema.  Back: No midline bony tenderness, deformities, or step-offs of the T-spine or L-spine. Skin appears normal without abrasions or bruising. No erythema, induration, or fluctuance.   Skin: Warm and dry.  Neurological:  Alert, awake, and appropriate.  Normal speech.  No acute focal neurological deficits are appreciated.  Psychiatric: Normal affect. Good eye contact. Appropriate in content.    ED Course    Procedures  ED Vital Signs:  Vitals:    03/13/23 0616 03/13/23 0635 03/13/23 0639 03/13/23 0641   BP: (!) 162/98   (!) 169/84   Pulse: 80  79 80   Resp: 16   20   Temp: 97.8 °F (36.6 °C)      TempSrc: Oral      SpO2: 98%   98%   Weight:  83.5 kg (184 lb)      03/13/23 0800 03/13/23 0819 03/13/23 0903   BP: (!) 180/92  (!) 173/92   Pulse: 88  86   Resp: 18 20 18   Temp:      TempSrc:      SpO2: 96%  98%   Weight:          Abnormal Lab Results:  Labs Reviewed - No data to display       Imaging Results:  Imaging Results              CT Lumbar Spine Without Contrast (Final result)  Result time 03/13/23 08:43:38      Final result by Sebastian Rojas MD (03/13/23 08:43:38)                   Impression:      No acute fracture or subluxation.    Degenerative changes at L4-5 and L5-S1.  The broad-based disc bulge at L4-5 is slightly larger compared to the previous MRI.    All CT scans at this facility are performed  using dose modulation techniques as appropriate to performed exam including the following:  automated exposure control; adjustment of mA and/or kV according to the patients size (this includes techniques or standardized protocols for targeted exams where dose is matched to indication/reason for exam: i.e. extremities or head);  iterative  reconstruction technique.      Electronically signed by: Sebastian Rojas MD  Date:    03/13/2023  Time:    08:43               Narrative:    EXAMINATION:  CT LUMBAR SPINE WITHOUT CONTRAST    CLINICAL HISTORY:  Lumbar radiculopathy, symptoms persist with conservative treatment;    TECHNIQUE:  Axial CT imaging was performed through the lumbar spine without intravenous contrast. Multiplanar reformats were reviewed and interpreted.    COMPARISON:  MRI of the lumbar spine of 05/22/2020    FINDINGS:  Vertebral body heights are normal.Alignment is normal.  Degenerative disc disease at L4-5 and L5-S1 with disc protrusions and facet DJD.  The broad-based disc bulge at L4-5 appears slightly larger compared to the prior study resulting in mild spinal canal stenosis.  Moderate bilateral neural foraminal narrowing at L4-5 and severe neural foraminal narrowing at L5-S1.  Mild spinal canal stenosis at L5-S1. No soft tissue abnormality detected.                                              The Emergency Provider reviewed the vital signs and test results, which are outlined above.    ED Discussion     9:46 AM: Reassessed pt at this time.  Discussed with pt all pertinent ED information and results. Discussed pt dx and plan of tx. Gave pt all f/u and return to the ED instructions. All questions and concerns were addressed at this time. Pt expresses understanding of information and instructions, and is comfortable with plan to discharge. Pt is stable for discharge.    I discussed with patient and/or family/caretaker that evaluation in the ED does not suggest any emergent or life threatening medical conditions requiring immediate intervention beyond what was provided in the ED, and I believe patient is safe for discharge.  Regardless, an unremarkable evaluation in the ED does not preclude the development or presence of a serious of life threatening condition. As such, patient was instructed to return immediately for any worsening or change  in current symptoms.           ED Medication(s):  Medications   HYDROcodone-acetaminophen 5-325 mg per tablet 1 tablet (1 tablet Oral Given 3/13/23 0819)   naproxen tablet 500 mg (500 mg Oral Given 3/13/23 0819)   tiZANidine tablet 4 mg (4 mg Oral Given 3/13/23 0820)   gabapentin capsule 800 mg (800 mg Oral Given 3/13/23 0937)     Discharge Medication List as of 3/13/2023  9:56 AM        START taking these medications    Details   cyclobenzaprine (FLEXERIL) 10 MG tablet Take 1 tablet (10 mg total) by mouth 3 (three) times daily as needed., Starting Mon 3/13/2023, Until Sat 3/18/2023 at 2359, Normal      HYDROcodone-acetaminophen (NORCO)  mg per tablet Take 1 tablet by mouth every 4 (four) hours as needed for Pain., Starting Mon 3/13/2023, Normal      naproxen (NAPROSYN) 500 MG tablet Take 1 tablet (500 mg total) by mouth 2 (two) times daily with meals., Starting Mon 3/13/2023, Normal              Medication List        START taking these medications      cyclobenzaprine 10 MG tablet  Commonly known as: FLEXERIL  Take 1 tablet (10 mg total) by mouth 3 (three) times daily as needed.     HYDROcodone-acetaminophen  mg per tablet  Commonly known as: NORCO  Take 1 tablet by mouth every 4 (four) hours as needed for Pain.     naproxen 500 MG tablet  Commonly known as: NAPROSYN  Take 1 tablet (500 mg total) by mouth 2 (two) times daily with meals.            ASK your doctor about these medications      albuterol 2.5 mg /3 mL (0.083 %) nebulizer solution  Commonly known as: PROVENTIL  INHALE THE CONTENTS OF 1 VIAL VIA NEBULIZER EVERY 4 TO 6 HOURS AS NEEDED FOR WHEEZING OR FOR SHORTNESS OF BREATH     allopurinoL 100 MG tablet  Commonly known as: ZYLOPRIM  Take 1 tablet (100 mg total) by mouth once daily.     amLODIPine 2.5 MG tablet  Commonly known as: NORVASC  Take 1 tablet (2.5 mg total) by mouth once daily.     aspirin 81 MG EC tablet  Commonly known as: ECOTRIN  Take 1 tablet (81 mg total) by mouth once  "daily.     atorvastatin 40 MG tablet  Commonly known as: LIPITOR  TAKE 1 TABLET BY MOUTH EVERY EVENING     azaTHIOprine 50 mg Tab  Commonly known as: IMURAN  Take 1 tablet (50 mg total) by mouth once daily.     blood sugar diagnostic Strp  1 each by Misc.(Non-Drug; Combo Route) route 4 (four) times daily.     blood-glucose meter kit  Commonly known as: TRUE METRIX GLUCOSE METER  Use as instructed to test blood glucose meter daily.     ELIQUIS 5 mg Tab  Generic drug: apixaban  TAKE 1 TABLET TWICE DAILY     EPINEPHrine 0.3 mg/0.3 mL Atin  Commonly known as: EPIPEN  Inject 0.3 mLs (0.3 mg total) into the muscle once. for 1 dose     furosemide 40 MG tablet  Commonly known as: LASIX  TAKE 2 TABLETS(80 MG) BY MOUTH TWICE DAILY     gabapentin 800 MG tablet  Commonly known as: NEURONTIN  Take 1 tablet (800 mg total) by mouth 3 (three) times daily.     insulin aspart U-100 100 unit/mL (3 mL) Inpn pen  Commonly known as: NovoLOG FlexPen U-100 Insulin  INJECT 10 UNITS SUBCUTANEOUSLY THREE TIMES DAILY WITH MEALS     JARDIANCE 25 mg tablet  Generic drug: empagliflozin  TAKE 1 TABLET EVERY DAY     * lisinopriL 10 MG tablet  TAKE 1 TABLET EVERY DAY     * lisinopriL 30 MG tablet  Commonly known as: PRINIVIL,ZESTRIL  TAKE 1 TABLET EVERY DAY     MICRO THIN LANCETS 33 gauge Misc  Generic drug: lancets     OZEMPIC 2 mg/dose (8 mg/3 mL) Pnij  Generic drug: semaglutide  Inject 2 mg into the skin every 7 days.     pantoprazole 40 MG tablet  Commonly known as: PROTONIX  Take 1 tablet (40 mg total) by mouth once daily.     papaverine-phentolamin-alprost 150 mg-5 mg- 50 mcg Solr  0.5 mLs by Intracavernosal route as needed (ED).     pen needle, diabetic 31 gauge x 5/16" Ndle  Commonly known as: BD ULTRA-FINE SHORT PEN NEEDLE  1 each by Misc.(Non-Drug; Combo Route) route 3 (three) times daily.     ranolazine 1,000 mg Tb12  Commonly known as: RANEXA  Take 1 tablet (1,000 mg total) by mouth 2 (two) times daily.     sertraline 100 MG " tablet  Commonly known as: ZOLOFT  TAKE 1 TABLET EVERY EVENING     SIMBRINZA 1-0.2 % Drps  Generic drug: brinzolamide-brimonidine  Place 1 drop into both eyes 3 (three) times daily.     tamsulosin 0.4 mg Cap  Commonly known as: FLOMAX  Take 1 capsule (0.4 mg total) by mouth once daily.     traZODone 100 MG tablet  Commonly known as: DESYREL  TAKE 2 TABLETS EVERY EVENING     TRELEGY ELLIPTA 200-62.5-25 mcg inhaler  Generic drug: fluticasone-umeclidin-vilanter  Inhale 1 puff into the lungs once daily.     VIOS AEROSOL DELIVERY SYSTEM Shefali  Generic drug: nebulizer and compressor     * XOLAIR 150 mg/mL injection  Generic drug: omalizumab  Inject 2 mLs (300 mg total) into the skin every 14 (fourteen) days.     * XOLAIR 75 mg/0.5 mL injection  Generic drug: omalizumab  Inject 0.5 mLs (75 mg total) into the skin every 14 (fourteen) days.           * This list has 4 medication(s) that are the same as other medications prescribed for you. Read the directions carefully, and ask your doctor or other care provider to review them with you.                   Where to Get Your Medications        These medications were sent to [id number] - [facility] [place] - [address] AT [place] [facility]  [address] [place] [address]      Phone: [phone]   cyclobenzaprine 10 MG tablet  HYDROcodone-acetaminophen  mg per tablet  naproxen 500 MG tablet            Follow-up Information       Cm Polanco MD. Schedule an appointment as soon as possible for a visit in 3 days.    Specialty: Family Medicine  Contact information:  [address]  [place] [address] [phone]               Eamon Chandra MD. Schedule an appointment as soon as possible for a visit in 1 week.    Specialty: Neurosurgery  Contact information:  [address] [facility] [address] [phone]                               Medical Decision Making            Medical Decision Making  Problems  "Addressed:  Chronic bilateral low back pain with bilateral sciatica: chronic illness or injury with exacerbation, progression, or side effects of treatment    Amount and/or Complexity of Data Reviewed  Independent Historian: EMS     Details: They state the pt reported he was told to get surg on his back but has refused thus far  External Data Reviewed: notes.     Details: from pain management notes - "Patient previously referred out to Neurosurgery for back/leg pain,Dr Chandra recommended anterior lumbar interbody fusion at L5-S"  Radiology: ordered and independent interpretation performed. Decision-making details documented in ED Course.     Details: No fx/dx noted on CT.  I Considered MRI but no new focal neuro deficits.  No indication for emergent MRI at this time    Risk  Prescription drug management.          Scribe Attestation:   Scribe #1: I performed the above scribed service and the documentation accurately describes the services I performed. I attest to the accuracy of the note.    Attending:   Physician Attestation Statement for Scribe #1: I, Jorgito Zapata MD, personally performed the services described in this documentation, as scribed by Carrie Collins, in my presence, and it is both accurate and complete.          Clinical Impression       ICD-10-CM ICD-9-CM   1. Chronic bilateral low back pain with bilateral sciatica  M54.42 724.2    M54.41 724.3    G89.29 338.29       Disposition:   Disposition: Discharged  Condition: Stable      Jorgito Zapata MD  03/15/23 0839       Jorgito Zapata MD  03/15/23 0846    "

## 2023-03-23 ENCOUNTER — TELEPHONE (OUTPATIENT)
Dept: INTERNAL MEDICINE | Facility: CLINIC | Age: 68
End: 2023-03-23
Payer: MEDICARE

## 2023-03-23 NOTE — TELEPHONE ENCOUNTER
----- Message from Peggy Arteaga sent at 3/23/2023  3:58 PM CDT -----  Regarding: Patient Callback Request  Contact: Crow Maria is requesting a callback from the nurse in regards to his back pain and he need some pain meds.  Crow has just been scheduled with pain management and will start goigt there 04.04.2023.        [id number] - [place] - [address] AT [place] [facility]  [address] [place] [address]  Phone: [phone] Fax: [phone]         Crow can be reached at [phone] (home)      Thanks

## 2023-03-24 NOTE — TELEPHONE ENCOUNTER
Outgoing call to pt regarding labs - no answer / unable to leave voicemail    Closing out referral due to unable to contact

## 2023-03-25 ENCOUNTER — DOCUMENTATION ONLY (OUTPATIENT)
Dept: INTERNAL MEDICINE | Facility: CLINIC | Age: 68
End: 2023-03-25

## 2023-03-27 ENCOUNTER — SPECIALTY PHARMACY (OUTPATIENT)
Dept: PHARMACY | Facility: CLINIC | Age: 68
End: 2023-03-27

## 2023-03-27 ENCOUNTER — TELEPHONE (OUTPATIENT)
Dept: ALLERGY | Facility: CLINIC | Age: 68
End: 2023-03-27
Payer: MEDICARE

## 2023-03-27 NOTE — TELEPHONE ENCOUNTER
Incoming call from pt.  Pt was closed out due to unable to contact.  Informed pt that [facility] was trying to reach him regarding IgE lab.  Informed pt that IgE labs need to be repeated for PA request for Xolair.  Informed pt that [facility] already has labs ordered and pt can go to lab to have drawn anytime.  Pt voiced understanding.  Pt stated he has [facility] appt on 4/4 and will have labs drawn then.  Notifying assigned pharmacist.

## 2023-03-27 NOTE — TELEPHONE ENCOUNTER
Information from [facility] has been given to patient.        ----- Message from Josie Hernandez MD sent at 3/27/2023  7:07 AM CDT -----  Regarding: RE: Xolair  Okay. Thank you  ----- Message -----  From: Fawn Lockhart PharmD  Sent: 3/24/2023   3:36 PM CDT  To: Josie Hernandez MD, Fawn Lockhart PharmD, #  Subject: Xolair                                           Good afternoon Dr. Hernandez and Staff,    [facility] has been unsuccessfully reaching out to the patient on multiple call attempts regarding Xolair and labs. [facility] will close out the referral for Xolair at this time. If you are able to reach the patient, please inform him to obtain labs and give us a call.      Thank you,  Srikanth SternD  Clinical Pharmacist, Financial Assistance Team  [facility]  (P): [phone]  (F): [phone]

## 2023-03-31 ENCOUNTER — HOSPITAL ENCOUNTER (EMERGENCY)
Facility: HOSPITAL | Age: 68
Discharge: HOME | End: 2023-03-31
Attending: EMERGENCY MEDICINE
Payer: MEDICARE

## 2023-03-31 VITALS
HEART RATE: 77 BPM | TEMPERATURE: 98 F | SYSTOLIC BLOOD PRESSURE: 167 MMHG | RESPIRATION RATE: 15 BRPM | OXYGEN SATURATION: 98 % | DIASTOLIC BLOOD PRESSURE: 80 MMHG

## 2023-03-31 DIAGNOSIS — Z79.01 CHRONIC ANTICOAGULATION: ICD-10-CM

## 2023-03-31 DIAGNOSIS — I48.20 CHRONIC ATRIAL FIBRILLATION: ICD-10-CM

## 2023-03-31 DIAGNOSIS — R10.10 UPPER ABDOMINAL PAIN: Primary | ICD-10-CM

## 2023-03-31 DIAGNOSIS — G89.29 CHRONIC MIDLINE LOW BACK PAIN WITH SCIATICA, SCIATICA LATERALITY UNSPECIFIED: ICD-10-CM

## 2023-03-31 DIAGNOSIS — M54.40 CHRONIC MIDLINE LOW BACK PAIN WITH SCIATICA, SCIATICA LATERALITY UNSPECIFIED: ICD-10-CM

## 2023-03-31 DIAGNOSIS — I10 CHRONIC HYPERTENSION: ICD-10-CM

## 2023-03-31 DIAGNOSIS — R79.89 ELEVATED TROPONIN: ICD-10-CM

## 2023-03-31 LAB
ALBUMIN SERPL BCP-MCNC: 3 G/DL (ref 3.5–5.2)
ALP SERPL-CCNC: 69 U/L (ref 55–135)
ALT SERPL W/O P-5'-P-CCNC: 12 U/L (ref 10–44)
ANION GAP SERPL CALC-SCNC: 11 MMOL/L (ref 8–16)
AST SERPL-CCNC: 15 U/L (ref 10–40)
BACTERIA #/AREA URNS HPF: NORMAL /HPF
BASOPHILS # BLD AUTO: 0.01 K/UL (ref 0–0.2)
BASOPHILS NFR BLD: 0.1 % (ref 0–1.9)
BILIRUB SERPL-MCNC: 0.2 MG/DL (ref 0.1–1)
BILIRUB UR QL STRIP: NEGATIVE
BUN SERPL-MCNC: 17 MG/DL (ref 8–23)
CALCIUM SERPL-MCNC: 8.7 MG/DL (ref 8.7–10.5)
CHLORIDE SERPL-SCNC: 104 MMOL/L (ref 95–110)
CLARITY UR: CLEAR
CO2 SERPL-SCNC: 24 MMOL/L (ref 23–29)
COLOR UR: COLORLESS
CREAT SERPL-MCNC: 0.9 MG/DL (ref 0.5–1.4)
DIFFERENTIAL METHOD: ABNORMAL
EOSINOPHIL # BLD AUTO: 0.1 K/UL (ref 0–0.5)
EOSINOPHIL NFR BLD: 1.8 % (ref 0–8)
ERYTHROCYTE [DISTWIDTH] IN BLOOD BY AUTOMATED COUNT: 17.2 % (ref 11.5–14.5)
EST. GFR  (NO RACE VARIABLE): >60 ML/MIN/1.73 M^2
GLUCOSE SERPL-MCNC: 107 MG/DL (ref 70–110)
GLUCOSE UR QL STRIP: NEGATIVE
HCT VFR BLD AUTO: 35 % (ref 40–54)
HCV AB SERPL QL IA: NEGATIVE
HGB BLD-MCNC: 11.2 G/DL (ref 14–18)
HGB UR QL STRIP: NEGATIVE
HIV 1+2 AB+HIV1 P24 AG SERPL QL IA: NEGATIVE
HYALINE CASTS #/AREA URNS LPF: 0 /LPF
IMM GRANULOCYTES # BLD AUTO: 0.01 K/UL (ref 0–0.04)
IMM GRANULOCYTES NFR BLD AUTO: 0.1 % (ref 0–0.5)
KETONES UR QL STRIP: NEGATIVE
LEUKOCYTE ESTERASE UR QL STRIP: NEGATIVE
LIPASE SERPL-CCNC: 108 U/L (ref 4–60)
LYMPHOCYTES # BLD AUTO: 1.9 K/UL (ref 1–4.8)
LYMPHOCYTES NFR BLD: 24.3 % (ref 18–48)
MCH RBC QN AUTO: 25.7 PG (ref 27–31)
MCHC RBC AUTO-ENTMCNC: 32 G/DL (ref 32–36)
MCV RBC AUTO: 80 FL (ref 82–98)
MICROSCOPIC COMMENT: NORMAL
MONOCYTES # BLD AUTO: 0.7 K/UL (ref 0.3–1)
MONOCYTES NFR BLD: 8.9 % (ref 4–15)
NEUTROPHILS # BLD AUTO: 5.2 K/UL (ref 1.8–7.7)
NEUTROPHILS NFR BLD: 64.8 % (ref 38–73)
NITRITE UR QL STRIP: NEGATIVE
NRBC BLD-RTO: 0 /100 WBC
PH UR STRIP: 6 [PH] (ref 5–8)
PLATELET # BLD AUTO: 178 K/UL (ref 150–450)
PMV BLD AUTO: 10.1 FL (ref 9.2–12.9)
POTASSIUM SERPL-SCNC: 3.4 MMOL/L (ref 3.5–5.1)
PROT SERPL-MCNC: 7.1 G/DL (ref 6–8.4)
PROT UR QL STRIP: ABNORMAL
RBC # BLD AUTO: 4.36 M/UL (ref 4.6–6.2)
RBC #/AREA URNS HPF: 0 /HPF (ref 0–4)
SODIUM SERPL-SCNC: 139 MMOL/L (ref 136–145)
SP GR UR STRIP: 1.01 (ref 1–1.03)
TROPONIN I SERPL DL<=0.01 NG/ML-MCNC: 0.16 NG/ML (ref 0–0.03)
TROPONIN I SERPL DL<=0.01 NG/ML-MCNC: 0.18 NG/ML (ref 0–0.03)
URN SPEC COLLECT METH UR: ABNORMAL
UROBILINOGEN UR STRIP-ACNC: NEGATIVE EU/DL
WBC # BLD AUTO: 8 K/UL (ref 3.9–12.7)
WBC #/AREA URNS HPF: 0 /HPF (ref 0–5)

## 2023-03-31 PROCEDURE — 84484 ASSAY OF TROPONIN QUANT: CPT | Performed by: EMERGENCY MEDICINE

## 2023-03-31 PROCEDURE — 85025 COMPLETE CBC W/AUTO DIFF WBC: CPT | Performed by: EMERGENCY MEDICINE

## 2023-03-31 PROCEDURE — 93010 EKG 12-LEAD: ICD-10-PCS | Mod: ,,, | Performed by: INTERNAL MEDICINE

## 2023-03-31 PROCEDURE — 93010 ELECTROCARDIOGRAM REPORT: CPT | Mod: ,,, | Performed by: INTERNAL MEDICINE

## 2023-03-31 PROCEDURE — 81000 URINALYSIS NONAUTO W/SCOPE: CPT | Performed by: EMERGENCY MEDICINE

## 2023-03-31 PROCEDURE — 96361 HYDRATE IV INFUSION ADD-ON: CPT

## 2023-03-31 PROCEDURE — 25000003 PHARM REV CODE 250: Performed by: EMERGENCY MEDICINE

## 2023-03-31 PROCEDURE — 80053 COMPREHEN METABOLIC PANEL: CPT | Performed by: EMERGENCY MEDICINE

## 2023-03-31 PROCEDURE — 96374 THER/PROPH/DIAG INJ IV PUSH: CPT

## 2023-03-31 PROCEDURE — 99285 EMERGENCY DEPT VISIT HI MDM: CPT | Mod: 25

## 2023-03-31 PROCEDURE — 87389 HIV-1 AG W/HIV-1&-2 AB AG IA: CPT | Performed by: EMERGENCY MEDICINE

## 2023-03-31 PROCEDURE — 93005 ELECTROCARDIOGRAM TRACING: CPT

## 2023-03-31 PROCEDURE — 63600175 PHARM REV CODE 636 W HCPCS: Performed by: EMERGENCY MEDICINE

## 2023-03-31 PROCEDURE — 83690 ASSAY OF LIPASE: CPT | Performed by: EMERGENCY MEDICINE

## 2023-03-31 PROCEDURE — 86803 HEPATITIS C AB TEST: CPT | Performed by: EMERGENCY MEDICINE

## 2023-03-31 RX ORDER — MORPHINE SULFATE 4 MG/ML
6 INJECTION, SOLUTION INTRAMUSCULAR; INTRAVENOUS
Status: COMPLETED | OUTPATIENT
Start: 2023-03-31 | End: 2023-03-31

## 2023-03-31 RX ORDER — ASPIRIN 325 MG
325 TABLET ORAL
Status: COMPLETED | OUTPATIENT
Start: 2023-03-31 | End: 2023-03-31

## 2023-03-31 RX ADMIN — SODIUM CHLORIDE 500 ML: 9 INJECTION, SOLUTION INTRAVENOUS at 04:03

## 2023-03-31 RX ADMIN — MORPHINE SULFATE 6 MG: 4 INJECTION INTRAVENOUS at 03:03

## 2023-03-31 RX ADMIN — ASPIRIN 325 MG ORAL TABLET 325 MG: 325 PILL ORAL at 06:03

## 2023-03-31 NOTE — ED PROVIDER NOTES
SCRIBE #1 NOTE: I, Adrian Lemus, am scribing for, and in the presence of, Marina Mtz MD. I have scribed the HPI, ROS, and PEx.    SCRIBE #2 NOTE: I, Carrie Collins, am scribing for, and in the presence of,  Ewelina Kay MD. I have scribed the remaining portions of the note not scribed by Scribe #1.    History     Chief Complaint   Patient presents with    Abdominal Pain     Left upper abdominal pain with nausea x few days that worsened today.     Review of patient's allergies indicates:   Allergen Reactions    Protamine Hives     Urticaria, possible upper airway swelling         History of Present Illness     HPI    3/31/2023, 3:26 AM  History obtained from the patient      History of Present Illness: Crow Green is a 67 y.o. male patient with a PMHx of CHF, a-fib, T2DM, HTN who presents to the Emergency Department for evaluation of LUQ abdominal pain which onset gradually 2 weeks ago. Pt states the pain has worsened today prompting his visit. Symptoms are constant and moderate in severity. He notes his pain worsens with movement. Associated sxs include left flank pain. Patient denies any fever, chills, n/v/d, constipation, dysuria, weakness, CP, SOB, and all other sxs at this time. No further complaints or concerns at this time.       Arrival mode: EMS    PCP: Cm Polanco MD        Past Medical History:  Past Medical History:   Diagnosis Date    Arthritis     Asthma     Atrial fibrillation     Atrial fibrillation with RVR 8/7/2019    CHF (congestive heart failure)     Chronic obstructive pulmonary disease 8/5/2020    Depression     Dermatomyositis     Diabetes mellitus, type 2 Diagnosed in 2000    Elevated PSA     Follow up 7/30/2020    Hypertension     Myocardial infarction     2017    Sleep apnea        Past Surgical History:  Past Surgical History:   Procedure Laterality Date    ABLATION OF ARRHYTHMOGENIC FOCUS FOR ATRIAL FIBRILLATION N/A 2/27/2020    Procedure: Ablation atrial  fibrillation;  Surgeon: Gio Brown MD;  Location: [facility] EP LAB;  Service: Cardiology;  Laterality: N/A;  afib, DAMIEN (cx if SR), PVI, PITO, anes, MB, 3 Prep    CHOLECYSTECTOMY      CLOSURE OF WOUND Left 7/1/2020    Procedure: CLOSURE, WOUND;  Surgeon: Barb Veras DPM;  Location: [facility];  Service: Podiatry;  Laterality: Left;    COLONOSCOPY N/A 3/4/2022    Procedure: COLONOSCOPY;  Surgeon: Yolis Freitas MD;  Location: [facility];  Service: Endoscopy;  Laterality: N/A;    ESOPHAGOGASTRODUODENOSCOPY N/A 3/4/2022    Procedure: EGD (ESOPHAGOGASTRODUODENOSCOPY);  Surgeon: Yolis Freitas MD;  Location: [facility];  Service: Endoscopy;  Laterality: N/A;    INJECTION OF ANESTHETIC AGENT INTO SACROILIAC JOINT Left 3/24/2021    Procedure: Left BLOCK, SACROILIAC JOINT and bilateral rhomboid TPI;  Surgeon: Dillan Tsai MD;  Location: [facility] PAIN MGT;  Service: Pain Management;  Laterality: Left;    INTRALUMINAL GASTROINTESTINAL TRACT IMAGING VIA CAPSULE N/A 3/22/2022    Procedure: IMAGING PROCEDURE, GI TRACT, INTRALUMINAL, VIA CAPSULE;  Surgeon: Justice Martinez RN;  Location: [facility];  Service: Endoscopy;  Laterality: N/A;    REVERSE TOTAL SHOULDER ARTHROPLASTY Left 1/10/2022    Procedure: ARTHROPLASTY, SHOULDER, TOTAL, REVERSE;  Surgeon: Anthony Alvarado MD;  Location: [facility];  Service: Orthopedics;  Laterality: Left;  left reverse total shoulder arthroplasty     SELECTIVE INJECTION OF ANESTHETIC AGENT AROUND LUMBAR SPINAL NERVE ROOT BY TRANSFORAMINAL APPROACH Left 6/11/2020    Procedure: BLOCK, SPINAL NERVE ROOT, LUMBAR, SELECTIVE, TRANSFORAMINAL APPROACH Left L4-5, L5-S1 TFESI with RN IV sedation;  Surgeon: Dillan Tsai MD;  Location: [facility] PAIN MGT;  Service: Pain Management;  Laterality: Left;    TOE AMPUTATION Left 6/29/2020    Procedure: AMPUTATION, TOE;  Surgeon: Barb Veras DPM;  Location: [facility];  Service: Podiatry;  Laterality: Left;  great toe         Family History:  Family History    Problem Relation Age of Onset    Hypertension Mother     Glaucoma Mother     Cataracts Mother     Diabetes Mother     Cataracts Father     Stroke Father     Glaucoma Sister         x3    Cataracts Sister         x3    Diabetes Sister         x1    Stroke Sister         x1    No Known Problems Brother     No Known Problems Daughter     No Known Problems Son     Glaucoma Maternal Aunt     Diabetes Maternal Aunt     Cancer Maternal Grandfather         lung (smoker)    Prostate cancer Neg Hx     Heart disease Neg Hx     Kidney disease Neg Hx        Social History:  Social History     Tobacco Use    Smoking status: Never    Smokeless tobacco: Never   Substance and Sexual Activity    Alcohol use: Yes     Comment: rare, maybe holidays    Drug use: Not Currently     Types: Cocaine    Sexual activity: Not Currently     Partners: Female        Review of Systems     Review of Systems   Constitutional:  Negative for fever.   HENT:  Negative for sore throat.    Respiratory:  Negative for shortness of breath.    Cardiovascular:  Negative for chest pain.   Gastrointestinal:  Positive for abdominal pain (LUQ). Negative for nausea and vomiting.   Genitourinary:  Positive for flank pain (left). Negative for dysuria.   Musculoskeletal:  Negative for back pain.   Skin:  Negative for rash.   Neurological:  Negative for weakness.   Hematological:  Does not bruise/bleed easily.   All other systems reviewed and are negative.     Physical Exam     Initial Vitals [03/31/23 0300]   BP Pulse Resp Temp SpO2   (!) 158/95 85 18 97.9 °F (36.6 °C) 97 %      MAP       --          Physical Exam   Nursing Notes and Vital Signs Reviewed.  Constitutional: Patient is in no acute distress. Well-developed and well-nourished.  Head: Atraumatic. Normocephalic.  Eyes: PERRL. EOM intact. Conjunctivae are not pale. No scleral icterus.  ENT: Mucous membranes are moist. Oropharynx is clear and symmetric.    Neck: Supple. Full ROM. No  lymphadenopathy.  Cardiovascular: Regular rate. Regular rhythm. No murmurs, rubs, or gallops. Distal pulses are 2+ and symmetric.  Pulmonary/Chest: No respiratory distress. Clear to auscultation bilaterally. No wheezing or rales.  Abdominal: Soft and non-distended.  There is LUQ tenderness.  No rebound, guarding, or rigidity. Good bowel sounds.  Genitourinary: No CVA tenderness  Musculoskeletal: Moves all extremities. No obvious deformities. No edema. No calf tenderness.  Skin: Warm and dry.  Neurological:  Alert, awake, and appropriate.  Normal speech.  No acute focal neurological deficits are appreciated.  Psychiatric: Normal affect. Good eye contact. Appropriate in content.     ED Course   Procedures  ED Vital Signs:  Vitals:    03/31/23 0300 03/31/23 0322 03/31/23 0333 03/31/23 0400   BP: (!) 158/95 (!) 157/75  (!) 166/79   Pulse: 85 77  80   Resp: 18 16 16 16   Temp: 97.9 °F (36.6 °C)      TempSrc: Oral      SpO2: 97% 96%  96%    03/31/23 0408 03/31/23 0430 03/31/23 0600   BP:  (!) 155/84 (!) 160/81   Pulse: 80 73 77   Resp:  18 18   Temp:      TempSrc:      SpO2:  96% 96%       Abnormal Lab Results:  Labs Reviewed   CBC W/ AUTO DIFFERENTIAL - Abnormal; Notable for the following components:       Result Value    RBC 4.36 (*)     Hemoglobin 11.2 (*)     Hematocrit 35.0 (*)     MCV 80 (*)     MCH 25.7 (*)     RDW 17.2 (*)     All other components within normal limits   COMPREHENSIVE METABOLIC PANEL - Abnormal; Notable for the following components:    Potassium 3.4 (*)     Albumin 3.0 (*)     All other components within normal limits   LIPASE - Abnormal; Notable for the following components:    Lipase 108 (*)     All other components within normal limits   URINALYSIS, REFLEX TO URINE CULTURE - Abnormal; Notable for the following components:    Color, UA Colorless (*)     Protein, UA 2+ (*)     All other components within normal limits    Narrative:     Specimen Source->Urine   TROPONIN I - Abnormal; Notable for  the following components:    Troponin I 0.181 (*)     All other components within normal limits   TROPONIN I - Abnormal; Notable for the following components:    Troponin I 0.156 (*)     All other components within normal limits   HIV 1 / 2 ANTIBODY    Narrative:     Release to patient->Immediate   HEPATITIS C ANTIBODY    Narrative:     Release to patient->Immediate   URINALYSIS MICROSCOPIC    Narrative:     Specimen Source->Urine   HEP C VIRUS HOLD SPECIMEN        All Lab Results:  Results for orders placed or performed during the hospital encounter of 03/31/23   CBC W/ AUTO DIFFERENTIAL   Result Value Ref Range    WBC 8.00 3.90 - 12.70 K/uL    RBC 4.36 (L) 4.60 - 6.20 M/uL    Hemoglobin 11.2 (L) 14.0 - 18.0 g/dL    Hematocrit 35.0 (L) 40.0 - 54.0 %    MCV 80 (L) 82 - 98 fL    MCH 25.7 (L) 27.0 - 31.0 pg    MCHC 32.0 32.0 - 36.0 g/dL    RDW 17.2 (H) 11.5 - 14.5 %    Platelets 178 150 - 450 K/uL    MPV 10.1 9.2 - 12.9 fL    Immature Granulocytes 0.1 0.0 - 0.5 %    Gran # (ANC) 5.2 1.8 - 7.7 K/uL    Immature Grans (Abs) 0.01 0.00 - 0.04 K/uL    Lymph # 1.9 1.0 - 4.8 K/uL    Mono # 0.7 0.3 - 1.0 K/uL    Eos # 0.1 0.0 - 0.5 K/uL    Baso # 0.01 0.00 - 0.20 K/uL    nRBC 0 0 /100 WBC    Gran % 64.8 38.0 - 73.0 %    Lymph % 24.3 18.0 - 48.0 %    Mono % 8.9 4.0 - 15.0 %    Eosinophil % 1.8 0.0 - 8.0 %    Basophil % 0.1 0.0 - 1.9 %    Differential Method Automated    Comp. Metabolic Panel   Result Value Ref Range    Sodium 139 136 - 145 mmol/L    Potassium 3.4 (L) 3.5 - 5.1 mmol/L    Chloride 104 95 - 110 mmol/L    CO2 24 23 - 29 mmol/L    Glucose 107 70 - 110 mg/dL    BUN 17 8 - 23 mg/dL    Creatinine 0.9 0.5 - 1.4 mg/dL    Calcium 8.7 8.7 - 10.5 mg/dL    Total Protein 7.1 6.0 - 8.4 g/dL    Albumin 3.0 (L) 3.5 - 5.2 g/dL    Total Bilirubin 0.2 0.1 - 1.0 mg/dL    Alkaline Phosphatase 69 55 - 135 U/L    AST 15 10 - 40 U/L    ALT 12 10 - 44 U/L    Anion Gap 11 8 - 16 mmol/L    eGFR >60 >60 mL/min/1.73 m^2   Lipase   Result  Value Ref Range    Lipase 108 (H) 4 - 60 U/L   Urinalysis, Reflex to Urine Culture Urine, Clean Catch    Specimen: Urine   Result Value Ref Range    Specimen UA Urine, Clean Catch     Color, UA Colorless (A) Yellow, Straw, Angela    Appearance, UA Clear Clear    pH, UA 6.0 5.0 - 8.0    Specific Gravity, UA 1.015 1.005 - 1.030    Protein, UA 2+ (A) Negative    Glucose, UA Negative Negative    Ketones, UA Negative Negative    Bilirubin (UA) Negative Negative    Occult Blood UA Negative Negative    Nitrite, UA Negative Negative    Urobilinogen, UA Negative <2.0 EU/dL    Leukocytes, UA Negative Negative   Troponin I   Result Value Ref Range    Troponin I 0.181 (H) 0.000 - 0.026 ng/mL   HIV 1/2 Ag/Ab (4th Gen)   Result Value Ref Range    HIV 1/2 Ag/Ab Negative Negative   Hepatitis C Antibody   Result Value Ref Range    Hepatitis C Ab Negative Negative   Troponin I   Result Value Ref Range    Troponin I 0.156 (H) 0.000 - 0.026 ng/mL   Urinalysis Microscopic   Result Value Ref Range    RBC, UA 0 0 - 4 /hpf    WBC, UA 0 0 - 5 /hpf    Bacteria None None-Occ /hpf    Hyaline Casts, UA 0 0-1/lpf /lpf    Microscopic Comment SEE COMMENT      *Note: Due to a large number of results and/or encounters for the requested time period, some results have not been displayed. A complete set of results can be found in Results Review.         Imaging Results:  Imaging Results              CT Abdomen Pelvis  Without Contrast (In process)                    6:33 AM: Per STAT radiology, pt's CT ABDOMEN & PELVIS Without Contrast results: No acute findings in the abdomen or pelvis.      The EKG was ordered, reviewed, and independently interpreted by the ED provider.  Interpretation time: 03:53  Rate: 82 BPM  Rhythm:  Sinus rhythm with 1st degree AV block.  Interpretation: RBBB. Left anterior fascicular block. No STEMI.    The Emergency Provider reviewed the vital signs and test results, which are outlined above.     ED Discussion       6:00 AM:  Dr. Mtz  transfers care of patient to Dr. Kay pending imaging results.    6:43 AM: Reassessed pt at this time. Discussed with pt all pertinent ED information and results. Discussed pt dx and plan of tx. Gave pt all f/u and return to the ED instructions. All questions and concerns were addressed at this time. Pt expresses understanding of information and instructions, and is comfortable with plan to discharge. Pt is stable for discharge.    I discussed with patient and/or family/caretaker that evaluation in the ED does not suggest any emergent or life threatening medical conditions requiring immediate intervention beyond what was provided in the ED, and I believe patient is safe for discharge.  Regardless, an unremarkable evaluation in the ED does not preclude the development or presence of a serious of life threatening condition. As such, patient was instructed to return immediately for any worsening or change in current symptoms.         Medical Decision Making:   History:   Old Records Summarized: records from clinic visits and records from previous admission(s).       <> Summary of Records: 3rd visit to the ER this month for his pain.   Clinical Tests:   Lab Tests: Ordered and Reviewed  Radiological Study: Ordered and Reviewed  Medical Tests: Ordered and Reviewed  ED Management:  Patient re-evaluated, sleeping upon re-eval, reports feeling better, lab work reviewed and troponin chronically elevated, repeat troponin done and lower than initial result, ECG at baseline, symptoms don't seem cardiac, UA and CT unremarkable. He is stable for discharge in my opinion with close outpatient follow up with his PCP and cardiology Dr. Zhao      Patient demanding pain medication to be discharged home with for his chronic back pain.  Explained at length to patient this is his 3rd visit to the ER for his chronic back pain when he has access to his primary care doctor and can follow up with his primary care doctor.  He  "has been given 2 rx for his chronic back pain this month.  Upon discharge patient refusing to leave until he gets more pain medication stating " I'm not leaving here until you give me more pain medication now.  You aren't going to tell me what I can't have."  Patient had to be escorted out by security for being verbally agressive towards staff.            ED Medication(s):  Medications   morphine injection 6 mg (6 mg Intravenous Given 3/31/23 0333)   sodium chloride 0.9% bolus 500 mL 500 mL (0 mLs Intravenous Stopped 3/31/23 0520)   aspirin tablet 325 mg (325 mg Oral Given 3/31/23 0643)       New Prescriptions    No medications on file        Follow-up Information       Cm Polanco MD. Schedule an appointment as soon as possible for a visit in 2 days.    Specialty: Family Medicine  Contact information:  [id number]  [place] [address]  [phone]               Eduard Zhao MD. Schedule an appointment as soon as possible for a visit in 2 days.    Specialties: Cardiology, Cardiovascular Disease  Why: Return to the Emergency Room, If symptoms worsen  Contact information:  [address]  [place] [address]  [phone]                                 Scribe Attestation:   Scribe #1: I performed the above scribed service and the documentation accurately describes the services I performed. I attest to the accuracy of the note.     Attending:   Physician Attestation Statement for Scribe #1: I, Marina Mtz MD, personally performed the services described in this documentation, as scribed by Adrian Lemus, in my presence, and it is both accurate and complete.       Scribe Attestation:   Scribe #2: I performed the above scribed service and the documentation accurately describes the services I performed. I attest to the accuracy of the note.    Attending Attestation:           Physician Attestation for Scribe:    Physician Attestation Statement for Scribe #2: I, Ewelina Kay MD, reviewed " documentation, as scribed by Carrie Collins in my presence, and it is both accurate and complete. I also acknowledge and confirm the content of the note done by Scribe #1.         Clinical Impression       ICD-10-CM ICD-9-CM   1. Upper abdominal pain  R10.10 789.09   2. Chronic atrial fibrillation  I48.20 427.31   3. Chronic anticoagulation  Z79.01 V58.61   4. Elevated troponin  R77.8 790.6   5. Chronic hypertension  I10 401.9   6. Chronic midline low back pain with sciatica, sciatica laterality unspecified  M54.40 724.2    G89.29 724.3     338.29       Disposition:   Disposition: Discharged  Condition: Stable       Ewelina Kay MD  03/31/23 0654

## 2023-03-31 NOTE — ED NOTES
Upon discharge, pt requested pain medication. MD and RN educated pt to see his PCP in order to get pain medication prescription. Pt states, 'I am not leaving until I get pain medication.' Educated pt that he will not be receiving any pain medication and that he is discharged. Pt then began raising voice and cursing at MD and RN. Pt IV promptly removed by RN and security called to escort pt to lobby.

## 2023-04-03 ENCOUNTER — TELEPHONE (OUTPATIENT)
Dept: PAIN MEDICINE | Facility: CLINIC | Age: 68
End: 2023-04-03
Payer: MEDICARE

## 2023-04-04 ENCOUNTER — PATIENT MESSAGE (OUTPATIENT)
Dept: PULMONOLOGY | Facility: CLINIC | Age: 68
End: 2023-04-04
Payer: MEDICARE

## 2023-04-11 ENCOUNTER — OFFICE VISIT (OUTPATIENT)
Dept: PODIATRY | Facility: CLINIC | Age: 68
End: 2023-04-11
Payer: MEDICARE

## 2023-04-11 VITALS — HEIGHT: 65 IN | WEIGHT: 184 LBS | BODY MASS INDEX: 30.66 KG/M2

## 2023-04-11 DIAGNOSIS — L84 PRE-ULCERATIVE CALLUSES: ICD-10-CM

## 2023-04-11 DIAGNOSIS — B35.1 DERMATOPHYTOSIS OF NAIL: ICD-10-CM

## 2023-04-11 DIAGNOSIS — E11.9 ENCOUNTER FOR COMPREHENSIVE DIABETIC FOOT EXAMINATION, TYPE 2 DIABETES MELLITUS: Primary | ICD-10-CM

## 2023-04-11 DIAGNOSIS — E11.49 TYPE II DIABETES MELLITUS WITH NEUROLOGICAL MANIFESTATIONS: ICD-10-CM

## 2023-04-11 DIAGNOSIS — S98.112A AMPUTATED GREAT TOE, LEFT: ICD-10-CM

## 2023-04-11 PROCEDURE — 11055 PARING/CUTG B9 HYPRKER LES 1: CPT | Mod: Q7,S$GLB,, | Performed by: PODIATRIST

## 2023-04-11 PROCEDURE — 99999 PR PBB SHADOW E&M-EST. PATIENT-LVL III: ICD-10-PCS | Mod: PBBFAC,,, | Performed by: PODIATRIST

## 2023-04-11 PROCEDURE — 99499 UNLISTED E&M SERVICE: CPT | Mod: S$GLB,,, | Performed by: PODIATRIST

## 2023-04-11 PROCEDURE — 3044F PR MOST RECENT HEMOGLOBIN A1C LEVEL <7.0%: ICD-10-PCS | Mod: CPTII,S$GLB,, | Performed by: PODIATRIST

## 2023-04-11 PROCEDURE — 99999 PR PBB SHADOW E&M-EST. PATIENT-LVL III: CPT | Mod: PBBFAC,,, | Performed by: PODIATRIST

## 2023-04-11 PROCEDURE — 1159F MED LIST DOCD IN RCRD: CPT | Mod: CPTII,S$GLB,, | Performed by: PODIATRIST

## 2023-04-11 PROCEDURE — 3288F PR FALLS RISK ASSESSMENT DOCUMENTED: ICD-10-PCS | Mod: CPTII,S$GLB,, | Performed by: PODIATRIST

## 2023-04-11 PROCEDURE — 3044F HG A1C LEVEL LT 7.0%: CPT | Mod: CPTII,S$GLB,, | Performed by: PODIATRIST

## 2023-04-11 PROCEDURE — 3008F PR BODY MASS INDEX (BMI) DOCUMENTED: ICD-10-PCS | Mod: CPTII,S$GLB,, | Performed by: PODIATRIST

## 2023-04-11 PROCEDURE — 1126F AMNT PAIN NOTED NONE PRSNT: CPT | Mod: CPTII,S$GLB,, | Performed by: PODIATRIST

## 2023-04-11 PROCEDURE — 1126F PR PAIN SEVERITY QUANTIFIED, NO PAIN PRESENT: ICD-10-PCS | Mod: CPTII,S$GLB,, | Performed by: PODIATRIST

## 2023-04-11 PROCEDURE — 4010F PR ACE/ARB THEARPY RXD/TAKEN: ICD-10-PCS | Mod: CPTII,S$GLB,, | Performed by: PODIATRIST

## 2023-04-11 PROCEDURE — 3008F BODY MASS INDEX DOCD: CPT | Mod: CPTII,S$GLB,, | Performed by: PODIATRIST

## 2023-04-11 PROCEDURE — 3288F FALL RISK ASSESSMENT DOCD: CPT | Mod: CPTII,S$GLB,, | Performed by: PODIATRIST

## 2023-04-11 PROCEDURE — 11721 PR DEBRIDEMENT OF NAILS, 6 OR MORE: ICD-10-PCS | Mod: 59,Q7,S$GLB, | Performed by: PODIATRIST

## 2023-04-11 PROCEDURE — 1101F PT FALLS ASSESS-DOCD LE1/YR: CPT | Mod: CPTII,S$GLB,, | Performed by: PODIATRIST

## 2023-04-11 PROCEDURE — 1159F PR MEDICATION LIST DOCUMENTED IN MEDICAL RECORD: ICD-10-PCS | Mod: CPTII,S$GLB,, | Performed by: PODIATRIST

## 2023-04-11 PROCEDURE — 11055 PR TRIM HYPERKERATOTIC SKIN LESION, ONE: ICD-10-PCS | Mod: Q7,S$GLB,, | Performed by: PODIATRIST

## 2023-04-11 PROCEDURE — 99499 NO LOS: ICD-10-PCS | Mod: S$GLB,,, | Performed by: PODIATRIST

## 2023-04-11 PROCEDURE — 4010F ACE/ARB THERAPY RXD/TAKEN: CPT | Mod: CPTII,S$GLB,, | Performed by: PODIATRIST

## 2023-04-11 PROCEDURE — 1160F PR REVIEW ALL MEDS BY PRESCRIBER/CLIN PHARMACIST DOCUMENTED: ICD-10-PCS | Mod: CPTII,S$GLB,, | Performed by: PODIATRIST

## 2023-04-11 PROCEDURE — 1101F PR PT FALLS ASSESS DOC 0-1 FALLS W/OUT INJ PAST YR: ICD-10-PCS | Mod: CPTII,S$GLB,, | Performed by: PODIATRIST

## 2023-04-11 PROCEDURE — 1160F RVW MEDS BY RX/DR IN RCRD: CPT | Mod: CPTII,S$GLB,, | Performed by: PODIATRIST

## 2023-04-11 PROCEDURE — 11721 DEBRIDE NAIL 6 OR MORE: CPT | Mod: 59,Q7,S$GLB, | Performed by: PODIATRIST

## 2023-04-11 NOTE — PROGRESS NOTES
Subjective:     Patient ID: Crow Green is a 67 y.o. male.    Chief Complaint: Nail Care (Diabetic nail care, pt is not in any pain, pt is wearing tennis shoes and socks, pt was last seen on 12/7/22 by PCP ROMIE Ingram)      Crow is a 67 y.o. male who presents to the clinic for evaluation and treatment of high risk feet. Crow has a past medical history of Arthritis, Asthma, Atrial fibrillation, Atrial fibrillation with RVR (8/7/2019), CHF (congestive heart failure), Chronic obstructive pulmonary disease (8/5/2020), Depression, Dermatomyositis, Diabetes mellitus, type 2 (Diagnosed in 2000), Elevated PSA, Follow up (7/30/2020), Hypertension, Myocardial infarction, and Sleep apnea. The patient's chief complaint is long, thick toenails. This patient has documented high risk feet requiring routine maintenance secondary to diabetes mellitis and those secondary complications of diabetes, as mentioned..    PCP: Cm Polanco MD    Date Last Seen by PCP: 12/07/2022    Current shoe gear:  Affected Foot: Rx diabetic extra depth shoes and custom accommodative insoles     Unaffected Foot: Rx diabetic extra depth shoes and custom accommodative insoles    Hemoglobin A1C   Date Value Ref Range Status   01/04/2023 6.3 (H) 4.0 - 5.6 % Final     Comment:     ADA Screening Guidelines:  5.7-6.4%  Consistent with prediabetes  >or=6.5%  Consistent with diabetes    High levels of fetal hemoglobin interfere with the HbA1C  assay. Heterozygous hemoglobin variants (HbS, HgC, etc)do  not significantly interfere with this assay.   However, presence of multiple variants may affect accuracy.     07/31/2022 6.0 (H) 4.0 - 5.6 % Final     Comment:     ADA Screening Guidelines:  5.7-6.4%  Consistent with prediabetes  >or=6.5%  Consistent with diabetes    High levels of fetal hemoglobin interfere with the HbA1C  assay. Heterozygous hemoglobin variants (HbS, HgC, etc)do  not significantly interfere with this assay.    However, presence of multiple variants may affect accuracy.     06/10/2022 6.4 (H) 4.0 - 5.6 % Final     Comment:     ADA Screening Guidelines:  5.7-6.4%  Consistent with prediabetes  >or=6.5%  Consistent with diabetes    High levels of fetal hemoglobin interfere with the HbA1C  assay. Heterozygous hemoglobin variants (HbS, HgC, etc)do  not significantly interfere with this assay.   However, presence of multiple variants may affect accuracy.         Patient Active Problem List   Diagnosis    ED (erectile dysfunction)    Non-proliferative diabetic retinopathy, mild, both eyes    Essential hypertension    Diabetic polyneuropathy    Nonobstructive coronary artery disease of native artery of native heart    Chronic combined systolic and diastolic heart failure    Type 2 diabetes mellitus    SANDRITA (obstructive sleep apnea)    H/O Asthma    Psychophysiological insomnia    Nightmares    Sleep related gastroesophageal reflux disease    Inadequate sleep hygiene    Chest pain    PAF (paroxysmal atrial fibrillation)    At risk for medication noncompliance    Obesity (BMI 30.0-34.9)    Indeterminate stage chronic open angle glaucoma    Right hip pain    Gait instability    Chronic right shoulder pain    Arthritis    Left sided sciatica    Osteoarthritis of spine with radiculopathy, lumbar region    HNP (herniated nucleus pulposus), lumbar    Gastroesophageal reflux disease without esophagitis    Hypogonadism in male    Disorder of parathyroid gland    Hyperlipidemia associated with type 2 diabetes mellitus    Traumatic tear of left rotator cuff    Other chronic pain    Left shoulder pain    Complete tear of left rotator cuff    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 associated with type 2 diabetes mellitus    Lumbar radiculopathy    Diabetic ulcer of toe of left foot associated with type 2 diabetes mellitus, with necrosis of muscle    Ulcer of left foot    Elevated troponin    Hypotension  due to medication    Dermatomyositis    Postprocedural hypotension    Advanced directives, counseling/discussion    Schizoaffective disorder, depressive type    Chronic obstructive pulmonary disease    MDD (major depressive disorder), recurrent episode, moderate    DOMINIK (generalized anxiety disorder)    Bilateral carpal tunnel syndrome    Rotator cuff tear arthropathy of left shoulder    Sacroiliac dysfunction    Atherosclerosis of native coronary artery of native heart with unstable angina pectoris    Coronary vasospasm    Cocaine abuse    Osteopenia    Atherosclerosis of aorta    Cardiac asthma    Chronic gout of multiple sites    Acquired absence of left great toe    Acute on chronic respiratory failure with hypoxia    Hypoxia    Cardiomyopathy    Bilateral carotid artery stenosis    Substance abuse(UDS + for cocaine )    Immunocompromised patient    Other reduced mobility    Abnormality of gait and mobility    Dependence on supplemental oxygen    Positive depression screening    Pulmonary hypertension- echo 7/2022       Medication List with Changes/Refills   Current Medications    ALBUTEROL (PROVENTIL) 2.5 MG /3 ML (0.083 %) NEBULIZER SOLUTION    INHALE THE CONTENTS OF 1 VIAL VIA NEBULIZER EVERY 4 TO 6 HOURS AS NEEDED FOR WHEEZING OR FOR SHORTNESS OF BREATH    ALLOPURINOL (ZYLOPRIM) 100 MG TABLET    Take 1 tablet (100 mg total) by mouth once daily.    AMLODIPINE (NORVASC) 2.5 MG TABLET    Take 1 tablet (2.5 mg total) by mouth once daily.    APIXABAN (ELIQUIS) 5 MG TAB    TAKE 1 TABLET TWICE DAILY    ASPIRIN (ECOTRIN) 81 MG EC TABLET    Take 1 tablet (81 mg total) by mouth once daily.    ATORVASTATIN (LIPITOR) 40 MG TABLET    TAKE 1 TABLET BY MOUTH EVERY EVENING    AZATHIOPRINE (IMURAN) 50 MG TAB    Take 1 tablet (50 mg total) by mouth once daily.    BLOOD SUGAR DIAGNOSTIC STRP    1 each by Misc.(Non-Drug; Combo Route) route 4 (four) times daily.    BLOOD-GLUCOSE METER (TRUE METRIX GLUCOSE METER) KIT    Use as  "instructed to test blood glucose meter daily.    BRINZOLAMIDE-BRIMONIDINE (SIMBRINZA) 1-0.2 % DRPS    Place 1 drop into both eyes 3 (three) times daily.    EPINEPHRINE (EPIPEN) 0.3 MG/0.3 ML ATIN    Inject 0.3 mLs (0.3 mg total) into the muscle once. for 1 dose    FLUTICASONE-UMECLIDIN-VILANTER (TRELEGY ELLIPTA) 200-62.5-25 MCG INHALER    Inhale 1 puff into the lungs once daily.    FUROSEMIDE (LASIX) 40 MG TABLET    TAKE 2 TABLETS(80 MG) BY MOUTH TWICE DAILY    GABAPENTIN (NEURONTIN) 800 MG TABLET    Take 1 tablet (800 mg total) by mouth 3 (three) times daily.    HYDROCODONE-ACETAMINOPHEN (NORCO)  MG PER TABLET    Take 1 tablet by mouth every 4 (four) hours as needed for Pain.    INSULIN ASPART U-100 (NOVOLOG FLEXPEN U-100 INSULIN) 100 UNIT/ML (3 ML) INPN PEN    INJECT 10 UNITS SUBCUTANEOUSLY THREE TIMES DAILY WITH MEALS    JARDIANCE 25 MG TABLET    TAKE 1 TABLET EVERY DAY    LATANOPROST 0.005 % OPHTHALMIC SOLUTION    INSTILL 1 DROP INTO BOTH EYES ONE TIME DAILY BOTTLE EXPIRES 42 DAYS AFTER OPENING...    LISINOPRIL (PRINIVIL,ZESTRIL) 30 MG TABLET    TAKE 1 TABLET EVERY DAY    LISINOPRIL 10 MG TABLET    TAKE 1 TABLET EVERY DAY    MICRO THIN LANCETS 33 GAU[place] [facility] [place]        NAPROXEN (NAPROSYN) 500 MG TABLET    Take 1 tablet (500 mg total) by mouth 2 (two) times daily with meals.    OMALIZUMAB (XOLAIR) 150 MG/ML INJECTION    Inject 2 mLs (300 mg total) into the skin every 14 (fourteen) days.    OMALIZUMAB (XOLAIR) 75 MG/0.5 ML INJECTION    Inject 0.5 mLs (75 mg total) into the skin every 14 (fourteen) days.    PANTOPRAZOLE (PROTONIX) 40 MG TABLET    Take 1 tablet (40 mg total) by mouth once daily.    PAPAVERINE-PHENTOLAMIN-ALPROST 150 MG-5 MG- 50 MCG SOLR    0.5 mLs by Intracavernosal route as needed (ED).    PEN NEEDLE, DIABETIC (BD ULTRA-FINE SHORT PEN NEEDLE) 31 GAUGE X 5/16" NDLE    1 each by Misc.(Non-Drug; Combo Route) route 3 (three) times daily.    RANOLAZINE (RANEXA) 1,000 MG TB12    Take 1 tablet (1,000 mg " total) by mouth 2 (two) times daily.    SEMAGLUTIDE (OZEMPIC) 2 MG/DOSE (8 MG/3 ML) PNIJ    Inject 2 mg into the skin every 7 days.    SERTRALINE (ZOLOFT) 100 MG TABLET    TAKE 1 TABLET EVERY EVENING    TAMSULOSIN (FLOMAX) 0.4 MG CAP    Take 1 capsule (0.4 mg total) by mouth once daily.    TRAZODONE (DESYREL) 100 MG TABLET    TAKE 2 TABLETS EVERY EVENING    VIOS AEROSOL DELIVERY SYSTEM VIN    USE AS DIRESTED       Review of patient's allergies indicates:   Allergen Reactions    Protamine Hives     Urticaria, possible upper airway swelling       Past Surgical History:   Procedure Laterality Date    ABLATION OF ARRHYTHMOGENIC FOCUS FOR ATRIAL FIBRILLATION N/A 2/27/2020    Procedure: Ablation atrial fibrillation;  Surgeon: Gio Brown MD;  Location: [facility] EP LAB;  Service: Cardiology;  Laterality: N/A;  afib, DAMIEN (cx if SR), PVI, PITO, anes, MB, 3 Prep    CHOLECYSTECTOMY      CLOSURE OF WOUND Left 7/1/2020    Procedure: CLOSURE, WOUND;  Surgeon: Barb Veras DPM;  Location: [facility] OR;  Service: Podiatry;  Laterality: Left;    COLONOSCOPY N/A 3/4/2022    Procedure: COLONOSCOPY;  Surgeon: Yolis Freitas MD;  Location: [facility] ENDO;  Service: Endoscopy;  Laterality: N/A;    ESOPHAGOGASTRODUODENOSCOPY N/A 3/4/2022    Procedure: EGD (ESOPHAGOGASTRODUODENOSCOPY);  Surgeon: Yolis Freitas MD;  Location: [facility] ENDO;  Service: Endoscopy;  Laterality: N/A;    INJECTION OF ANESTHETIC AGENT INTO SACROILIAC JOINT Left 3/24/2021    Procedure: Left BLOCK, SACROILIAC JOINT and bilateral rhomboid TPI;  Surgeon: Dillan Tsai MD;  Location: [facility] PAIN MGT;  Service: Pain Management;  Laterality: Left;    INTRALUMINAL GASTROINTESTINAL TRACT IMAGING VIA CAPSULE N/A 3/22/2022    Procedure: IMAGING PROCEDURE, GI TRACT, INTRALUMINAL, VIA CAPSULE;  Surgeon: Justice Martinez RN;  Location: [facility] ENDO;  Service: Endoscopy;  Laterality: N/A;    REVERSE TOTAL SHOULDER ARTHROPLASTY Left 1/10/2022    Procedure: ARTHROPLASTY, SHOULDER,  TOTAL, REVERSE;  Surgeon: Anthony Alvarado MD;  Location: [facility] OR;  Service: Orthopedics;  Laterality: Left;  left reverse total shoulder arthroplasty     SELECTIVE INJECTION OF ANESTHETIC AGENT AROUND LUMBAR SPINAL NERVE ROOT BY TRANSFORAMINAL APPROACH Left 6/11/2020    Procedure: BLOCK, SPINAL NERVE ROOT, LUMBAR, SELECTIVE, TRANSFORAMINAL APPROACH Left L4-5, L5-S1 TFESI with RN IV sedation;  Surgeon: Dillan Tsai MD;  Location: [facility] PAIN MGT;  Service: Pain Management;  Laterality: Left;    TOE AMPUTATION Left 6/29/2020    Procedure: AMPUTATION, TOE;  Surgeon: Barb Veras DPM;  Location: [facility] OR;  Service: Podiatry;  Laterality: Left;  great toe       Family History   Problem Relation Age of Onset    Hypertension Mother     Glaucoma Mother     Cataracts Mother     Diabetes Mother     Cataracts Father     Stroke Father     Glaucoma Sister         x3    Cataracts Sister         x3    Diabetes Sister         x1    Stroke Sister         x1    No Known Problems Brother     No Known Problems Daughter     No Known Problems Son     Glaucoma Maternal Aunt     Diabetes Maternal Aunt     Cancer Maternal Grandfather         lung (smoker)    Prostate cancer Neg Hx     Heart disease Neg Hx     Kidney disease Neg Hx        Social History     Socioeconomic History    Marital status: Legally     Number of children: 5   Occupational History    Occupation: disabled    Occupation: PT at [employer]    Tobacco Use    Smoking status: Never    Smokeless tobacco: Never   Substance and Sexual Activity    Alcohol use: Yes     Comment: rare, maybe holidays    Drug use: Not Currently     Types: Cocaine    Sexual activity: Not Currently     Partners: Female   Social History Narrative    Utilizing [employer] transportation which has been unreliable.      Social Determinants of Health     Financial Resource Strain: Low Risk     Difficulty of Paying Living Expenses: Not very hard   Food Insecurity: No Food  "Insecurity    Worried About Running Out of Food in the Last Year: Never true    Ran Out of Food in the Last Year: Never true   Transportation Needs: Unmet Transportation Needs    Lack of Transportation (Medical): Yes    Lack of Transportation (Non-Medical): Yes   Physical Activity: Inactive    Days of Exercise per Week: 0 days    Minutes of Exercise per Session: 0 min   Stress: Stress Concern Present    Feeling of Stress : Rather much   Social Connections: Moderately Isolated    Frequency of Communication with Friends and Family: Once a week    Frequency of Social Gatherings with Friends and Family: Once a week    Attends Spiritism Services: More than 4 times per year    Active Member of Clubs or Organizations: No    Attends Club or Organization Meetings: More than 4 times per year    Marital Status:    Housing Stability: Low Risk     Unable to Pay for Housing in the Last Year: No    Number of Places Lived in the Last Year: 1    Unstable Housing in the Last Year: No       Vitals:    04/11/23 0943   Weight: 83.5 kg (184 lb)   Height: 5' 5" (1.651 m)   PainSc: 0-No pain       Hemoglobin A1C   Date Value Ref Range Status   01/04/2023 6.3 (H) 4.0 - 5.6 % Final     Comment:     ADA Screening Guidelines:  5.7-6.4%  Consistent with prediabetes  >or=6.5%  Consistent with diabetes    High levels of fetal hemoglobin interfere with the HbA1C  assay. Heterozygous hemoglobin variants (HbS, HgC, etc)do  not significantly interfere with this assay.   However, presence of multiple variants may affect accuracy.     07/31/2022 6.0 (H) 4.0 - 5.6 % Final     Comment:     ADA Screening Guidelines:  5.7-6.4%  Consistent with prediabetes  >or=6.5%  Consistent with diabetes    High levels of fetal hemoglobin interfere with the HbA1C  assay. Heterozygous hemoglobin variants (HbS, HgC, etc)do  not significantly interfere with this assay.   However, presence of multiple variants may affect accuracy.     06/10/2022 6.4 (H) 4.0 - 5.6 % " Final     Comment:     ADA Screening Guidelines:  5.7-6.4%  Consistent with prediabetes  >or=6.5%  Consistent with diabetes    High levels of fetal hemoglobin interfere with the HbA1C  assay. Heterozygous hemoglobin variants (HbS, HgC, etc)do  not significantly interfere with this assay.   However, presence of multiple variants may affect accuracy.         Review of Systems   Constitutional:  Negative for chills and fever.   Respiratory:  Negative for shortness of breath.    Cardiovascular:  Negative for chest pain, palpitations, orthopnea, claudication and leg swelling.   Gastrointestinal:  Negative for diarrhea, nausea and vomiting.   Musculoskeletal:  Negative for joint pain.   Skin:  Negative for rash.   Neurological:  Positive for tingling and sensory change.   Psychiatric/Behavioral: Negative.             Objective:      PHYSICAL EXAM: Apperance: Alert and orient in no distress,well developed, and with good attention to grooming and body habits  Patient presents ambulating in diabetic shoes and inserts.   LOWER EXTREMITY EXAM:  VASCULAR: Dorsalis pedis pulses 2/4 bilateral and Posterior Tibial pulses 2/4 bilateral. Capillary fill time <4 seconds bilateral. No edema observed bilateral. Varicosities absent bilateral. Skin temperature of the lower extremities is warm to warm, proximal to distal. Hair growth dim bilateral.  DERMATOLOGICAL: No skin rashes, subcutaneous nodules, lesions, or ulcers observed bilateral. Nails 1,2,3,4,5 right and 2,3,4,5 left elongated, thickened, and discolored with subungual debris. Webspaces 1,2,3,4 bilateral clean, dry and without evidence of break in skin integrity. Mild hyperkeratotic tissue noted to left medial 1st MPJ and distal hallux amputation stump.   NEUROLOGICAL: Light touch, sharp-dull, proprioception all present and equal bilaterally.  Vibratory sensation diminished at bilateral hallux. Protective sensation absent sites as tested with a Dundas-Kimmie 5.07  monofilament.   MUSCULOSKELETAL: Muscle strength is 5/5 for foot inverters, everters, plantarflexors, and dorsiflexors. Muscle tone is normal. Left hallux amputation noted.           Assessment:       ICD-10-CM ICD-9-CM   1. Encounter for comprehensive diabetic foot examination, type 2 diabetes mellitus  E11.9 250.00   2. Type II diabetes mellitus with neurological manifestations  E11.49 250.60   3. Pre-ulcerative calluses - Left Foot  L84 700   4. Dermatophytosis of nail  B35.1 110.1   5. Amputated great toe, left - Left Foot  S98.112A 895.0         Plan:   Encounter for comprehensive diabetic foot examination, type 2 diabetes mellitus    Type II diabetes mellitus with neurological manifestations  -     DIABETIC SHOES FOR HOME USE    Pre-ulcerative calluses - Left Foot  -     DIABETIC SHOES FOR HOME USE    Dermatophytosis of nail    Amputated great toe, left - Left Foot  -     DIABETIC SHOES FOR HOME USE      I counseled the patient on his conditions, regarding findings of my examination, my impressions, and usual treatment plan.   This visit spent on counseling and coordination of care.  Appointment spent on education about the diabetic foot, neuropathy, and prevention of limb loss.  Shoe inspection. Diabetic Foot Education. Patient reminded of the importance of good nutrition and blood sugar control to help prevent podiatric complications of diabetes. Patient instructed on proper foot hygeine. We discussed wearing proper shoe gear, daily foot inspections, never walking without protective shoe gear, never putting sharp instruments to feet.    With patient's permission, nails 1-5 bilateral were debrided/trimmed in length and thickness aggressively to their soft tissue attachment mechanically and with electric , removing all offending nail and debris. Patient relates relief following the procedure.  With patient's permission, left foot callus trimmed in thickness with #15 blade in thickness without incident.    Prescription written for Diabetic shoes and inserts.   Patient  will continue to monitor the areas daily, inspect feet, wear protective shoe gear when ambulatory, moisturizer to maintain skin integrity. Patient reminded of the importance of good nutrition and blood sugar control to help prevent podiatric complications of diabetes.  Patient to return 4 months or sooner if needed.          Barb Veras DPM  [facility]

## 2023-04-13 NOTE — TELEPHONE ENCOUNTER
Outgoing call to pt to inform pt office will schedule labs on same day as his other appt on 4/20. Reminded pt to obtain IgE after his appt is completed. Pt verbalized understanding. [facility] will continue to follow up once IgE is obtained

## 2023-04-17 ENCOUNTER — EXTERNAL HOME HEALTH (OUTPATIENT)
Dept: HOME HEALTH SERVICES | Facility: HOSPITAL | Age: 68
End: 2023-04-17
Payer: MEDICARE

## 2023-04-20 ENCOUNTER — LAB VISIT (OUTPATIENT)
Dept: LAB | Facility: HOSPITAL | Age: 68
End: 2023-04-20
Attending: FAMILY MEDICINE
Payer: MEDICARE

## 2023-04-20 ENCOUNTER — OFFICE VISIT (OUTPATIENT)
Dept: INTERNAL MEDICINE | Facility: CLINIC | Age: 68
End: 2023-04-20
Payer: MEDICARE

## 2023-04-20 VITALS
OXYGEN SATURATION: 95 % | DIASTOLIC BLOOD PRESSURE: 70 MMHG | WEIGHT: 188.25 LBS | HEIGHT: 65 IN | HEART RATE: 82 BPM | SYSTOLIC BLOOD PRESSURE: 110 MMHG | BODY MASS INDEX: 31.36 KG/M2 | TEMPERATURE: 97 F

## 2023-04-20 DIAGNOSIS — F25.1 SCHIZOAFFECTIVE DISORDER, DEPRESSIVE TYPE: ICD-10-CM

## 2023-04-20 DIAGNOSIS — E11.3312 MODERATE NONPROLIFERATIVE DIABETIC RETINOPATHY OF LEFT EYE WITH MACULAR EDEMA ASSOCIATED WITH TYPE 2 DIABETES MELLITUS: ICD-10-CM

## 2023-04-20 DIAGNOSIS — I48.0 PAF (PAROXYSMAL ATRIAL FIBRILLATION): Chronic | ICD-10-CM

## 2023-04-20 DIAGNOSIS — R89.9 ABNORMAL LABORATORY TEST RESULT: ICD-10-CM

## 2023-04-20 DIAGNOSIS — E11.3219 TYPE 2 DIABETES MELLITUS WITH MILD NONPROLIFERATIVE RETINOPATHY AND MACULAR EDEMA, WITH LONG-TERM CURRENT USE OF INSULIN, UNSPECIFIED LATERALITY: ICD-10-CM

## 2023-04-20 DIAGNOSIS — M1A.09X0 CHRONIC GOUT OF MULTIPLE SITES, UNSPECIFIED CAUSE: ICD-10-CM

## 2023-04-20 DIAGNOSIS — M85.80 OSTEOPENIA, UNSPECIFIED LOCATION: ICD-10-CM

## 2023-04-20 DIAGNOSIS — I65.23 BILATERAL CAROTID ARTERY STENOSIS: ICD-10-CM

## 2023-04-20 DIAGNOSIS — Z79.4 TYPE 2 DIABETES MELLITUS WITH MILD NONPROLIFERATIVE RETINOPATHY AND MACULAR EDEMA, WITH LONG-TERM CURRENT USE OF INSULIN, UNSPECIFIED LATERALITY: ICD-10-CM

## 2023-04-20 DIAGNOSIS — F41.1 GAD (GENERALIZED ANXIETY DISORDER): ICD-10-CM

## 2023-04-20 DIAGNOSIS — I10 ESSENTIAL HYPERTENSION: ICD-10-CM

## 2023-04-20 DIAGNOSIS — Z00.00 ROUTINE HEALTH MAINTENANCE: Primary | ICD-10-CM

## 2023-04-20 DIAGNOSIS — M85.89 OTHER SPECIFIED DISORDERS OF BONE DENSITY AND STRUCTURE, MULTIPLE SITES: ICD-10-CM

## 2023-04-20 LAB — IGE SERPL-ACNC: 1343 IU/ML (ref 0–100)

## 2023-04-20 PROCEDURE — 4010F ACE/ARB THERAPY RXD/TAKEN: CPT | Mod: CPTII,S$GLB,, | Performed by: FAMILY MEDICINE

## 2023-04-20 PROCEDURE — 1101F PT FALLS ASSESS-DOCD LE1/YR: CPT | Mod: CPTII,S$GLB,, | Performed by: FAMILY MEDICINE

## 2023-04-20 PROCEDURE — 1159F MED LIST DOCD IN RCRD: CPT | Mod: CPTII,S$GLB,, | Performed by: FAMILY MEDICINE

## 2023-04-20 PROCEDURE — 3288F FALL RISK ASSESSMENT DOCD: CPT | Mod: CPTII,S$GLB,, | Performed by: FAMILY MEDICINE

## 2023-04-20 PROCEDURE — 3074F PR MOST RECENT SYSTOLIC BLOOD PRESSURE < 130 MM HG: ICD-10-PCS | Mod: CPTII,S$GLB,, | Performed by: FAMILY MEDICINE

## 2023-04-20 PROCEDURE — 3044F PR MOST RECENT HEMOGLOBIN A1C LEVEL <7.0%: ICD-10-PCS | Mod: CPTII,S$GLB,, | Performed by: FAMILY MEDICINE

## 2023-04-20 PROCEDURE — 99499 RISK ADDL DX/OHS AUDIT: ICD-10-PCS | Mod: S$GLB,,, | Performed by: FAMILY MEDICINE

## 2023-04-20 PROCEDURE — 1101F PR PT FALLS ASSESS DOC 0-1 FALLS W/OUT INJ PAST YR: ICD-10-PCS | Mod: CPTII,S$GLB,, | Performed by: FAMILY MEDICINE

## 2023-04-20 PROCEDURE — 3288F PR FALLS RISK ASSESSMENT DOCUMENTED: ICD-10-PCS | Mod: CPTII,S$GLB,, | Performed by: FAMILY MEDICINE

## 2023-04-20 PROCEDURE — 99999 PR PBB SHADOW E&M-EST. PATIENT-LVL V: ICD-10-PCS | Mod: PBBFAC,,, | Performed by: FAMILY MEDICINE

## 2023-04-20 PROCEDURE — 1125F PR PAIN SEVERITY QUANTIFIED, PAIN PRESENT: ICD-10-PCS | Mod: CPTII,S$GLB,, | Performed by: FAMILY MEDICINE

## 2023-04-20 PROCEDURE — 99999 PR PBB SHADOW E&M-EST. PATIENT-LVL V: CPT | Mod: PBBFAC,,, | Performed by: FAMILY MEDICINE

## 2023-04-20 PROCEDURE — 1125F AMNT PAIN NOTED PAIN PRSNT: CPT | Mod: CPTII,S$GLB,, | Performed by: FAMILY MEDICINE

## 2023-04-20 PROCEDURE — 82785 ASSAY OF IGE: CPT | Performed by: ALLERGY & IMMUNOLOGY

## 2023-04-20 PROCEDURE — 3008F BODY MASS INDEX DOCD: CPT | Mod: CPTII,S$GLB,, | Performed by: FAMILY MEDICINE

## 2023-04-20 PROCEDURE — 3044F HG A1C LEVEL LT 7.0%: CPT | Mod: CPTII,S$GLB,, | Performed by: FAMILY MEDICINE

## 2023-04-20 PROCEDURE — 1159F PR MEDICATION LIST DOCUMENTED IN MEDICAL RECORD: ICD-10-PCS | Mod: CPTII,S$GLB,, | Performed by: FAMILY MEDICINE

## 2023-04-20 PROCEDURE — 99397 PR PREVENTIVE VISIT,EST,65 & OVER: ICD-10-PCS | Mod: S$GLB,,, | Performed by: FAMILY MEDICINE

## 2023-04-20 PROCEDURE — 99499 UNLISTED E&M SERVICE: CPT | Mod: S$GLB,,, | Performed by: FAMILY MEDICINE

## 2023-04-20 PROCEDURE — 4010F PR ACE/ARB THEARPY RXD/TAKEN: ICD-10-PCS | Mod: CPTII,S$GLB,, | Performed by: FAMILY MEDICINE

## 2023-04-20 PROCEDURE — 3074F SYST BP LT 130 MM HG: CPT | Mod: CPTII,S$GLB,, | Performed by: FAMILY MEDICINE

## 2023-04-20 PROCEDURE — 99397 PER PM REEVAL EST PAT 65+ YR: CPT | Mod: S$GLB,,, | Performed by: FAMILY MEDICINE

## 2023-04-20 PROCEDURE — 3078F PR MOST RECENT DIASTOLIC BLOOD PRESSURE < 80 MM HG: ICD-10-PCS | Mod: CPTII,S$GLB,, | Performed by: FAMILY MEDICINE

## 2023-04-20 PROCEDURE — 36415 COLL VENOUS BLD VENIPUNCTURE: CPT | Performed by: ALLERGY & IMMUNOLOGY

## 2023-04-20 PROCEDURE — 3008F PR BODY MASS INDEX (BMI) DOCUMENTED: ICD-10-PCS | Mod: CPTII,S$GLB,, | Performed by: FAMILY MEDICINE

## 2023-04-20 PROCEDURE — 3078F DIAST BP <80 MM HG: CPT | Mod: CPTII,S$GLB,, | Performed by: FAMILY MEDICINE

## 2023-04-20 NOTE — PROGRESS NOTES
Subjective:      Patient ID: Crow Green is a 67 y.o. male.    Chief Complaint: Annual Exam    HPI. here for physical examination and issues  below  And problist below  Chr sciatica and left mid back pain (two months)( few ed visits ANd ct abd late march ok);has pain mgt appt [place]onto eval  Patient Active Problem List   Diagnosis    ED (erectile dysfunction)    Non-proliferative diabetic retinopathy, mild, both eyes    Essential hypertension    Diabetic polyneuropathy    Nonobstructive coronary artery disease of native artery of native heart    Chronic combined systolic and diastolic heart failure    Type 2 diabetes mellitus    SANDRITA (obstructive sleep apnea)    H/O Asthma    Psychophysiological insomnia    Nightmares    Sleep related gastroesophageal reflux disease    Inadequate sleep hygiene    Chest pain    PAF (paroxysmal atrial fibrillation)    At risk for medication noncompliance    Obesity (BMI 30.0-34.9)    Indeterminate stage chronic open angle glaucoma    Right hip pain    Gait instability    Chronic right shoulder pain    Arthritis    Left sided sciatica    Osteoarthritis of spine with radiculopathy, lumbar region    HNP (herniated nucleus pulposus), lumbar    Gastroesophageal reflux disease without esophagitis    Hypogonadism in male    Disorder of parathyroid gland    Hyperlipidemia associated with type 2 diabetes mellitus    Traumatic tear of left rotator cuff    Other chronic pain    Left shoulder pain    Complete tear of left rotator cuff    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 associated with type 2 diabetes mellitus    Lumbar radiculopathy    Diabetic ulcer of toe of left foot associated with type 2 diabetes mellitus, with necrosis of muscle    Ulcer of left foot    Elevated troponin    Hypotension due to medication    Dermatomyositis    Postprocedural hypotension    Advanced directives, counseling/discussion     Schizoaffective disorder, depressive type    Chronic obstructive pulmonary disease    MDD (major depressive disorder), recurrent episode, moderate    DOMINIK (generalized anxiety disorder)    Bilateral carpal tunnel syndrome    Rotator cuff tear arthropathy of left shoulder    Sacroiliac dysfunction    Atherosclerosis of native coronary artery of native heart with unstable angina pectoris    Coronary vasospasm    Cocaine abuse    Osteopenia    Atherosclerosis of aorta    Cardiac asthma    Chronic gout of multiple sites    Acquired absence of left great toe    Acute on chronic respiratory failure with hypoxia    Hypoxia    Cardiomyopathy    Bilateral carotid artery stenosis    Substance abuse(UDS + for cocaine )    Immunocompromised patient    Other reduced mobility    Abnormality of gait and mobility    Dependence on supplemental oxygen    Positive depression screening    Pulmonary hypertension- echo 7/2022      Past Medical History:   Diagnosis Date    Arthritis     Asthma     Atrial fibrillation     Atrial fibrillation with RVR 08/07/2019    Carotid artery stenosis     CHF (congestive heart failure)     Chronic obstructive pulmonary disease 08/05/2020    Depression     Dermatomyositis     Diabetes mellitus, type 2 Diagnosed in 2000    Elevated PSA     Follow up 07/30/2020    Hypertension     Myocardial infarction     2017    Sleep apnea       Past Surgical History:   Procedure Laterality Date    ABLATION OF ARRHYTHMOGENIC FOCUS FOR ATRIAL FIBRILLATION N/A 2/27/2020    Procedure: Ablation atrial fibrillation;  Surgeon: Gio Brown MD;  Location: [facility] EP LAB;  Service: Cardiology;  Laterality: N/A;  afib, DAMIEN (cx if SR), PVI, PITO, anes, MB, 3 Prep    CHOLECYSTECTOMY      CLOSURE OF WOUND Left 7/1/2020    Procedure: CLOSURE, WOUND;  Surgeon: Barb Veras DPM;  Location: [facility] OR;  Service: Podiatry;  Laterality: Left;    COLONOSCOPY N/A 3/4/2022    Procedure:  COLONOSCOPY;  Surgeon: Yolis Freitas MD;  Location: [facility] ENDO;  Service: Endoscopy;  Laterality: N/A;    ESOPHAGOGASTRODUODENOSCOPY N/A 3/4/2022    Procedure: EGD (ESOPHAGOGASTRODUODENOSCOPY);  Surgeon: Yolis Freitas MD;  Location: [facility] ENDO;  Service: Endoscopy;  Laterality: N/A;    INJECTION OF ANESTHETIC AGENT INTO SACROILIAC JOINT Left 3/24/2021    Procedure: Left BLOCK, SACROILIAC JOINT and bilateral rhomboid TPI;  Surgeon: Dillan Tsai MD;  Location: [facility] PAIN MGT;  Service: Pain Management;  Laterality: Left;    INTRALUMINAL GASTROINTESTINAL TRACT IMAGING VIA CAPSULE N/A 3/22/2022    Procedure: IMAGING PROCEDURE, GI TRACT, INTRALUMINAL, VIA CAPSULE;  Surgeon: Justice Martinez RN;  Location: [facility] ENDO;  Service: Endoscopy;  Laterality: N/A;    REVERSE TOTAL SHOULDER ARTHROPLASTY Left 1/10/2022    Procedure: ARTHROPLASTY, SHOULDER, TOTAL, REVERSE;  Surgeon: Anthony Alvarado MD;  Location: [facility] OR;  Service: Orthopedics;  Laterality: Left;  left reverse total shoulder arthroplasty     SELECTIVE INJECTION OF ANESTHETIC AGENT AROUND LUMBAR SPINAL NERVE ROOT BY TRANSFORAMINAL APPROACH Left 6/11/2020    Procedure: BLOCK, SPINAL NERVE ROOT, LUMBAR, SELECTIVE, TRANSFORAMINAL APPROACH Left L4-5, L5-S1 TFESI with RN IV sedation;  Surgeon: Dillan Tsai MD;  Location: [facility] PAIN MGT;  Service: Pain Management;  Laterality: Left;    TOE AMPUTATION Left 6/29/2020    Procedure: AMPUTATION, TOE;  Surgeon: Barb Veras DPM;  Location: [facility] OR;  Service: Podiatry;  Laterality: Left;  great toe      Social History     Socioeconomic History    Marital status: Legally     Number of children: 5   Occupational History    Occupation: disabled    Occupation: PT at [employer]    Tobacco Use    Smoking status: Never    Smokeless tobacco: Never   Substance and Sexual Activity    Alcohol use: Yes     Comment: rare, maybe holidays    Drug use: Not Currently     Types: Cocaine     Sexual activity: Not Currently     Partners: Female   Social History Narrative    Utilizing [employer] transportation which has been unreliable.      Social Determinants of Health     Financial Resource Strain: Low Risk     Difficulty of Paying Living Expenses: Not very hard   Food Insecurity: No Food Insecurity    Worried About Running Out of Food in the Last Year: Never true    Ran Out of Food in the Last Year: Never true   Transportation Needs: Unmet Transportation Needs    Lack of Transportation (Medical): Yes    Lack of Transportation (Non-Medical): Yes   Physical Activity: Inactive    Days of Exercise per Week: 0 days    Minutes of Exercise per Session: 0 min   Stress: Stress Concern Present    Feeling of Stress : Rather much   Social Connections: Moderately Isolated    Frequency of Communication with Friends and Family: Once a week    Frequency of Social Gatherings with Friends and Family: Once a week    Attends Jewish Services: More than 4 times per year    Active Member of Clubs or Organizations: No    Attends Club or Organization Meetings: More than 4 times per year    Marital Status:    Housing Stability: Low Risk     Unable to Pay for Housing in the Last Year: No    Number of Places Lived in the Last Year: 1    Unstable Housing in the Last Year: No      Family History   Problem Relation Age of Onset    Hypertension Mother     Glaucoma Mother     Cataracts Mother     Diabetes Mother     Cataracts Father     Stroke Father     Glaucoma Sister         x3    Cataracts Sister         x3    Diabetes Sister         x1    Stroke Sister         x1    No Known Problems Brother     No Known Problems Daughter     No Known Problems Son     Glaucoma Maternal Aunt     Diabetes Maternal Aunt     Cancer Maternal Grandfather         lung (smoker)    Prostate cancer Neg Hx     Heart disease Neg Hx     Kidney disease Neg Hx       Review of Systems    Cardiovascular: no chest  pain  Chest: no shortness of breath  Abd: no abd pain  Remainder review of systems negative      Objective:     Physical Exam  Vitals and nursing note reviewed.   Constitutional:       Appearance: He is well-developed.   HENT:      Head: Normocephalic and atraumatic.      Right Ear: External ear normal.      Left Ear: External ear normal.      Nose: Nose normal.   Eyes:      General: No scleral icterus.     Conjunctiva/sclera: Conjunctivae normal.      Pupils: Pupils are equal, round, and reactive to light.   Neck:      Vascular: No carotid bruit.   Cardiovascular:      Rate and Rhythm: Normal rate and regular rhythm.      Heart sounds: Normal heart sounds. No murmur heard.    No friction rub. No gallop.   Pulmonary:      Effort: Pulmonary effort is normal.      Breath sounds: Normal breath sounds. No wheezing.   Abdominal:      General: Bowel sounds are normal. There is no distension.      Palpations: Abdomen is soft. There is no mass.      Tenderness: There is no abdominal tenderness. There is no guarding or rebound.      Comments: Ttp left upper quad both over and below costal margin. No mass   Musculoskeletal:         General: No tenderness. Normal range of motion.      Cervical back: Normal range of motion and neck supple.   Lymphadenopathy:      Cervical: No cervical adenopathy.   Skin:     General: Skin is warm and dry.      Findings: No erythema or rash.   Neurological:      Mental Status: He is alert and oriented to person, place, and time.      Cranial Nerves: No cranial nerve deficit.      Coordination: Coordination normal.   Psychiatric:         Behavior: Behavior normal.         Thought Content: Thought content normal.         Judgment: Judgment normal.     Assessment:         ICD-10-CM ICD-9-CM   1. Routine health maintenance  Z00.00 V70.0   2. PAF (paroxysmal atrial fibrillation)  I48.0 427.31   3. Essential hypertension  I10 401.9   4. Type 2 diabetes mellitus with mild nonproliferative retinopathy  and macular edema, with long-term current use of insulin, unspecified laterality  E11.3219 250.50    Z79.4 362.04     V58.67     362.07   5. 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 associated with type 2 diabetes mellitus  E11.3312 250.50     362.07     362.05   6. Schizoaffective disorder, depressive type  F25.1 295.70   7. DOMINIK (generalized anxiety disorder)  F41.1 300.02   8. Osteopenia, unspecified location  M85.80 733.90   9. Chronic gout of multiple sites, unspecified cause  M1A.09X0 274.02   10. Bilateral carotid artery stenosis  I65.23 433.10     433.30   11. Other specified disorders of bone density and structure, multiple sites  M85.89 733.99      Plan:    Msg to card forhis overdue f/u  Pain mgmt and other specialists as [facility] health tohelp manage meds as in past  F/ [place] 6 months    1. Routine health maintenance    2. PAF (paroxysmal atrial fibrillation)  -     Ambulatory referral/consult to Home Health; Future; Expected date: 04/21/2023    3. Essential hypertension    4. Type 2 diabetes mellitus with mild nonproliferative retinopathy and macular edema, with long-term current use of insulin, unspecified laterality  Overview:  Patient reports that he has been compliant with medications and diet, last HA1c was 6.1 on 3/3.     Orders:  -     Ambulatory referral/consult to Home Health; Future; Expected date: 04/21/2023    5. 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 Schizoaffective disorder, depressive type  -     Ambulatory referral/consult to Home Health; Future; Expected date: 04/21/2023    7. DOMINIK (generalized anxiety disorder)    8. Osteopenia, unspecified location  Overview:  DEXA 2/18    Orders:  -     DXA Bone Density Axial Skeleton 1 or more sites; Future; Expected date: 04/20/2023    9. Chronic gout of multiple sites, unspecified cause    10. Bilateral carotid artery  stenosis  -     US Carotid Bilateral; Future; Expected date: 04/20/2023    11. Other specified disorders of bone density and structure, multiple sites  -     DXA Bone Density Axial Skeleton 1 or more sites; Future; Expected date: 04/20/2023

## 2023-04-24 ENCOUNTER — TELEPHONE (OUTPATIENT)
Dept: PAIN MEDICINE | Facility: CLINIC | Age: 68
End: 2023-04-24
Payer: MEDICARE

## 2023-04-24 PROCEDURE — G0180 MD CERTIFICATION HHA PATIENT: HCPCS | Mod: ,,, | Performed by: FAMILY MEDICINE

## 2023-04-24 PROCEDURE — G0180 PR HOME HEALTH MD CERTIFICATION: ICD-10-PCS | Mod: ,,, | Performed by: FAMILY MEDICINE

## 2023-04-24 NOTE — TELEPHONE ENCOUNTER
Attempted to call Pt in regards to 4/27/23 apt- unable to get call through state    Code 19 the called has restrictions preventing the completions of this call.

## 2023-04-26 ENCOUNTER — TELEPHONE (OUTPATIENT)
Dept: PAIN MEDICINE | Facility: CLINIC | Age: 68
End: 2023-04-26
Payer: MEDICARE

## 2023-04-26 ENCOUNTER — TELEPHONE (OUTPATIENT)
Dept: NEUROSURGERY | Facility: CLINIC | Age: 68
End: 2023-04-26
Payer: MEDICARE

## 2023-04-26 NOTE — TELEPHONE ENCOUNTER
Called pt twice to inform pt that dr medley was booked in [place] on Friday and his appt was r/s pt phone has a block restriction wasn't able to leave vm. Called pt sister contact she stated I had the wrong # called relative relative did not answer.    Aster Gallagher also asked me to cancel pts appt and stated that the pt did not need to be seen d/t him being referred to Neurosurgery.

## 2023-04-27 ENCOUNTER — TELEPHONE (OUTPATIENT)
Dept: PAIN MEDICINE | Facility: CLINIC | Age: 68
End: 2023-04-27

## 2023-05-01 ENCOUNTER — TELEPHONE (OUTPATIENT)
Dept: ALLERGY | Facility: CLINIC | Age: 68
End: 2023-05-01
Payer: MEDICARE

## 2023-05-01 ENCOUNTER — PATIENT MESSAGE (OUTPATIENT)
Dept: ALLERGY | Facility: CLINIC | Age: 68
End: 2023-05-01
Payer: MEDICARE

## 2023-05-01 NOTE — TELEPHONE ENCOUNTER
Unable to leave message at patient's phone number due to restrictions. Appointment for Tezspire discussion made for tomorrow at night. Sent information to patient via My chart.

## 2023-05-01 NOTE — TELEPHONE ENCOUNTER
----- Message from Josie Hernandez MD sent at 5/1/2023  7:57 AM CDT -----  Regarding: RE: Xolair  Okay. Thank you.  Staff, please schedule an appointment so we can discuss Tezspire.  Dr. Hernandez  ----- Message -----  From: Fawn Lockhart PharmD  Sent: 4/28/2023  11:55 AM CDT  To: Josie Hernandez MD, Fawn Lockhart, PharmD, #  Subject: Xolair                                           Good morning Dr. Hernandez and Staff,    To meet the plan criteria for Xolair, the patient must have a IgE level between 30 IU / mL - 70 IU / mL for his diagnosis. The patient obtained labs on 4/20 and his IgE level is 1343 IU / mL. The patient does not meet criteria for Xolair therapy. One recommendation he may be eligible for is Tezspire. If appropriate, please follow up with the patient to discuss therapy. [facility] will await for your response.    Thank you,  Fawn Lockhart, PharmD  Clinical Pharmacist, Financial Assistance Team  [facility]  (P): [phone]  (F): [phone]

## 2023-05-05 ENCOUNTER — EXTERNAL HOME HEALTH (OUTPATIENT)
Dept: HOME HEALTH SERVICES | Facility: HOSPITAL | Age: 68
End: 2023-05-05
Payer: MEDICARE

## 2023-05-09 ENCOUNTER — TELEPHONE (OUTPATIENT)
Dept: PODIATRY | Facility: CLINIC | Age: 68
End: 2023-05-09
Payer: MEDICARE

## 2023-05-09 NOTE — TELEPHONE ENCOUNTER
Spoke with Trey Avlia to let him know to fax over the paperwork for Mr. Maria to [phone]            ----- Message from Megan Dean sent at 5/9/2023  2:30 PM CDT -----  Contact: Trey/[facility]  Trey with [facility] is calling to speak with the nurse regarding script. Reports needing new script but provider would like a call back so the provider could let the physician know what to write. Please give Trey a call back at [phone].

## 2023-05-16 ENCOUNTER — PATIENT OUTREACH (OUTPATIENT)
Dept: PULMONOLOGY | Facility: CLINIC | Age: 68
End: 2023-05-16
Payer: MEDICARE

## 2023-05-22 ENCOUNTER — TELEPHONE (OUTPATIENT)
Dept: PULMONOLOGY | Facility: CLINIC | Age: 68
End: 2023-05-22
Payer: MEDICARE

## 2023-05-22 NOTE — TELEPHONE ENCOUNTER
Chronic Disease Management:   Called patient to confirm Pulmonary Disease Management appointment.  Patient is currently hospitalized. Will reschedule visits.

## 2023-05-26 ENCOUNTER — DOCUMENT SCAN (OUTPATIENT)
Dept: HOME HEALTH SERVICES | Facility: HOSPITAL | Age: 68
End: 2023-05-26
Payer: MEDICARE

## 2023-05-30 ENCOUNTER — PATIENT OUTREACH (OUTPATIENT)
Dept: ADMINISTRATIVE | Facility: HOSPITAL | Age: 68
End: 2023-05-30
Payer: MEDICARE

## 2023-05-31 ENCOUNTER — OFFICE VISIT (OUTPATIENT)
Dept: INTERNAL MEDICINE | Facility: CLINIC | Age: 68
End: 2023-05-31
Payer: MEDICARE

## 2023-05-31 VITALS
SYSTOLIC BLOOD PRESSURE: 110 MMHG | WEIGHT: 185.19 LBS | BODY MASS INDEX: 30.82 KG/M2 | DIASTOLIC BLOOD PRESSURE: 72 MMHG | HEART RATE: 76 BPM | RESPIRATION RATE: 18 BRPM | TEMPERATURE: 98 F | OXYGEN SATURATION: 96 %

## 2023-05-31 DIAGNOSIS — I25.110 ATHEROSCLEROSIS OF NATIVE CORONARY ARTERY OF NATIVE HEART WITH UNSTABLE ANGINA PECTORIS: ICD-10-CM

## 2023-05-31 DIAGNOSIS — I25.118 CORONARY ARTERY DISEASE OF NATIVE ARTERY OF NATIVE HEART WITH STABLE ANGINA PECTORIS: Chronic | ICD-10-CM

## 2023-05-31 DIAGNOSIS — Z91.89 AT RISK FOR MEDICATION NONCOMPLIANCE: ICD-10-CM

## 2023-05-31 DIAGNOSIS — M53.3 SACROILIAC DYSFUNCTION: ICD-10-CM

## 2023-05-31 DIAGNOSIS — Z09 HOSPITAL DISCHARGE FOLLOW-UP: Primary | ICD-10-CM

## 2023-05-31 DIAGNOSIS — J96.21 ACUTE ON CHRONIC RESPIRATORY FAILURE WITH HYPOXIA: ICD-10-CM

## 2023-05-31 DIAGNOSIS — Z79.4 TYPE 2 DIABETES MELLITUS WITH MILD NONPROLIFERATIVE RETINOPATHY AND MACULAR EDEMA, WITH LONG-TERM CURRENT USE OF INSULIN, UNSPECIFIED LATERALITY: ICD-10-CM

## 2023-05-31 DIAGNOSIS — M51.26 HNP (HERNIATED NUCLEUS PULPOSUS), LUMBAR: ICD-10-CM

## 2023-05-31 DIAGNOSIS — I50.42 CHRONIC COMBINED SYSTOLIC AND DIASTOLIC HEART FAILURE: ICD-10-CM

## 2023-05-31 DIAGNOSIS — F19.10 SUBSTANCE ABUSE: ICD-10-CM

## 2023-05-31 DIAGNOSIS — I20.1 CORONARY VASOSPASM: ICD-10-CM

## 2023-05-31 DIAGNOSIS — I48.0 PAF (PAROXYSMAL ATRIAL FIBRILLATION): Chronic | ICD-10-CM

## 2023-05-31 DIAGNOSIS — I70.0 ATHEROSCLEROSIS OF AORTA: ICD-10-CM

## 2023-05-31 DIAGNOSIS — I10 ESSENTIAL HYPERTENSION: ICD-10-CM

## 2023-05-31 DIAGNOSIS — E11.3219 TYPE 2 DIABETES MELLITUS WITH MILD NONPROLIFERATIVE RETINOPATHY AND MACULAR EDEMA, WITH LONG-TERM CURRENT USE OF INSULIN, UNSPECIFIED LATERALITY: ICD-10-CM

## 2023-05-31 DIAGNOSIS — G89.29 CHRONIC RIGHT SHOULDER PAIN: ICD-10-CM

## 2023-05-31 DIAGNOSIS — F14.10 COCAINE ABUSE: ICD-10-CM

## 2023-05-31 DIAGNOSIS — M25.511 CHRONIC RIGHT SHOULDER PAIN: ICD-10-CM

## 2023-05-31 PROCEDURE — 1159F PR MEDICATION LIST DOCUMENTED IN MEDICAL RECORD: ICD-10-PCS | Mod: CPTII,S$GLB,, | Performed by: PHYSICIAN ASSISTANT

## 2023-05-31 PROCEDURE — 1126F PR PAIN SEVERITY QUANTIFIED, NO PAIN PRESENT: ICD-10-PCS | Mod: CPTII,S$GLB,, | Performed by: PHYSICIAN ASSISTANT

## 2023-05-31 PROCEDURE — 3044F HG A1C LEVEL LT 7.0%: CPT | Mod: CPTII,S$GLB,, | Performed by: PHYSICIAN ASSISTANT

## 2023-05-31 PROCEDURE — 4010F PR ACE/ARB THEARPY RXD/TAKEN: ICD-10-PCS | Mod: CPTII,S$GLB,, | Performed by: PHYSICIAN ASSISTANT

## 2023-05-31 PROCEDURE — 99999 PR PBB SHADOW E&M-EST. PATIENT-LVL V: CPT | Mod: PBBFAC,,, | Performed by: PHYSICIAN ASSISTANT

## 2023-05-31 PROCEDURE — 99499 UNLISTED E&M SERVICE: CPT | Mod: S$GLB,,, | Performed by: PHYSICIAN ASSISTANT

## 2023-05-31 PROCEDURE — 3074F PR MOST RECENT SYSTOLIC BLOOD PRESSURE < 130 MM HG: ICD-10-PCS | Mod: CPTII,S$GLB,, | Performed by: PHYSICIAN ASSISTANT

## 2023-05-31 PROCEDURE — 3008F BODY MASS INDEX DOCD: CPT | Mod: CPTII,S$GLB,, | Performed by: PHYSICIAN ASSISTANT

## 2023-05-31 PROCEDURE — 1126F AMNT PAIN NOTED NONE PRSNT: CPT | Mod: CPTII,S$GLB,, | Performed by: PHYSICIAN ASSISTANT

## 2023-05-31 PROCEDURE — 3074F SYST BP LT 130 MM HG: CPT | Mod: CPTII,S$GLB,, | Performed by: PHYSICIAN ASSISTANT

## 2023-05-31 PROCEDURE — 3008F PR BODY MASS INDEX (BMI) DOCUMENTED: ICD-10-PCS | Mod: CPTII,S$GLB,, | Performed by: PHYSICIAN ASSISTANT

## 2023-05-31 PROCEDURE — 1160F RVW MEDS BY RX/DR IN RCRD: CPT | Mod: CPTII,S$GLB,, | Performed by: PHYSICIAN ASSISTANT

## 2023-05-31 PROCEDURE — 99214 PR OFFICE/OUTPT VISIT, EST, LEVL IV, 30-39 MIN: ICD-10-PCS | Mod: S$GLB,,, | Performed by: PHYSICIAN ASSISTANT

## 2023-05-31 PROCEDURE — 1159F MED LIST DOCD IN RCRD: CPT | Mod: CPTII,S$GLB,, | Performed by: PHYSICIAN ASSISTANT

## 2023-05-31 PROCEDURE — 1160F PR REVIEW ALL MEDS BY PRESCRIBER/CLIN PHARMACIST DOCUMENTED: ICD-10-PCS | Mod: CPTII,S$GLB,, | Performed by: PHYSICIAN ASSISTANT

## 2023-05-31 PROCEDURE — 4010F ACE/ARB THERAPY RXD/TAKEN: CPT | Mod: CPTII,S$GLB,, | Performed by: PHYSICIAN ASSISTANT

## 2023-05-31 PROCEDURE — 3078F PR MOST RECENT DIASTOLIC BLOOD PRESSURE < 80 MM HG: ICD-10-PCS | Mod: CPTII,S$GLB,, | Performed by: PHYSICIAN ASSISTANT

## 2023-05-31 PROCEDURE — 99999 PR PBB SHADOW E&M-EST. PATIENT-LVL V: ICD-10-PCS | Mod: PBBFAC,,, | Performed by: PHYSICIAN ASSISTANT

## 2023-05-31 PROCEDURE — 99499 RISK ADDL DX/OHS AUDIT: ICD-10-PCS | Mod: S$GLB,,, | Performed by: PHYSICIAN ASSISTANT

## 2023-05-31 PROCEDURE — 3044F PR MOST RECENT HEMOGLOBIN A1C LEVEL <7.0%: ICD-10-PCS | Mod: CPTII,S$GLB,, | Performed by: PHYSICIAN ASSISTANT

## 2023-05-31 PROCEDURE — 3078F DIAST BP <80 MM HG: CPT | Mod: CPTII,S$GLB,, | Performed by: PHYSICIAN ASSISTANT

## 2023-05-31 PROCEDURE — 99214 OFFICE O/P EST MOD 30 MIN: CPT | Mod: S$GLB,,, | Performed by: PHYSICIAN ASSISTANT

## 2023-05-31 NOTE — PROGRESS NOTES
Subjective:      Patient ID: Crow Green is a 67 y.o. male.    Chief Complaint: No chief complaint on file.    HPI  Here today for a hospital follow up.  Found un[place] front of the food [facility]. He was hypotensive and given IV fluids and narcan. His consciousness improved. Admitted to the hospital and given IV fluids. D'C'd lisinopril in the ER. PT bp stable.  Pt denies using drugs (crack) that day. Pt states that he uses crack to control his chronic pain. When he has pain medications he does not use.   Pt was dismissed from pain management last year due to failing drug screen.       Transitional Care Note    Family and/or Caretaker present at visit?  No.  Diagnostic tests reviewed/disposition: I have reviewed all completed as well as pending diagnostic tests at the time of discharge.  Disease/illness education: completed  Home health/community services discussion/referrals: Pt has home health already established  Establishment or re-establishment of referral orders for community resources: No other necessary community resources.   Discussion with other health care providers: No discussion with other health care providers necessary.     In the ER he was prescribed brief course of Ultram, Percocet after fall, rib bruise. Lidoderm patch ordered  Scheduled to see neurosurg next month.     Patient Active Problem List   Diagnosis    ED (erectile dysfunction)    Non-proliferative diabetic retinopathy, mild, both eyes    Essential hypertension    Diabetic polyneuropathy    Nonobstructive coronary artery disease of native artery of native heart    Chronic combined systolic and diastolic heart failure    Type 2 diabetes mellitus    SANDRITA (obstructive sleep apnea)    H/O Asthma    Psychophysiological insomnia    Nightmares    Sleep related gastroesophageal reflux disease    Inadequate sleep hygiene    Chest pain    PAF (paroxysmal atrial fibrillation)    At risk for medication noncompliance    Obesity (BMI  30.0-34.9)    Indeterminate stage chronic open angle glaucoma    Right hip pain    Gait instability    Chronic right shoulder pain    Arthritis    Left sided sciatica    Osteoarthritis of spine with radiculopathy, lumbar region    HNP (herniated nucleus pulposus), lumbar    Gastroesophageal reflux disease without esophagitis    Hypogonadism in male    Disorder of parathyroid gland    Hyperlipidemia associated with type 2 diabetes mellitus    Traumatic tear of left rotator cuff    Other chronic pain    Left shoulder pain    Complete tear of left rotator cuff    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 associated with type 2 diabetes mellitus    Lumbar radiculopathy    Diabetic ulcer of toe of left foot associated with type 2 diabetes mellitus, with necrosis of muscle    Ulcer of left foot    Elevated troponin    Hypotension due to medication    Dermatomyositis    Postprocedural hypotension    Advanced directives, counseling/discussion    Schizoaffective disorder, depressive type    Chronic obstructive pulmonary disease    MDD (major depressive disorder), recurrent episode, moderate    DOMINIK (generalized anxiety disorder)    Bilateral carpal tunnel syndrome    Rotator cuff tear arthropathy of left shoulder    Sacroiliac dysfunction    Atherosclerosis of native coronary artery of native heart with unstable angina pectoris    Coronary vasospasm    Cocaine abuse    Osteopenia    Atherosclerosis of aorta    Cardiac asthma    Chronic gout of multiple sites    Acquired absence of left great toe    Acute on chronic respiratory failure with hypoxia    Hypoxia    Cardiomyopathy    Bilateral carotid artery stenosis    Substance abuse(UDS + for cocaine )    Immunocompromised patient    Other reduced mobility    Abnormality of gait and mobility    Dependence on supplemental oxygen    Positive depression screening    Pulmonary hypertension- echo 7/2022         Current Outpatient Medications:      albuterol (PROVENTIL) 2.5 mg /3 mL (0.083 %) nebulizer solution, INHALE THE CONTENTS OF 1 VIAL VIA NEBULIZER EVERY 4 TO 6 HOURS AS NEEDED FOR WHEEZING OR FOR SHORTNESS OF BREATH, Disp: 1080 mL, Rfl: 3    allopurinoL (ZYLOPRIM) 100 MG tablet, Take 1 tablet (100 mg total) by mouth once daily., Disp: 90 tablet, Rfl: 1    amLODIPine (NORVASC) 2.5 MG tablet, Take 1 tablet (2.5 mg total) by mouth once daily., Disp: 30 tablet, Rfl: 11    apixaban (ELIQUIS) 5 mg Tab, TAKE 1 TABLET TWICE DAILY, Disp: 180 tablet, Rfl: 0    aspirin (ECOTRIN) 81 MG EC tablet, Take 1 tablet (81 mg total) by mouth once daily., Disp: , Rfl:     atorvastatin (LIPITOR) 40 MG tablet, TAKE 1 TABLET BY MOUTH EVERY EVENING, Disp: 90 tablet, Rfl: 3    azaTHIOprine (IMURAN) 50 mg Tab, Take 1 tablet (50 mg total) by mouth once daily., Disp: 90 tablet, Rfl: 0    blood sugar diagnostic Strp, 1 each by Misc.(Non-Drug; Combo Route) route 4 (four) times daily., Disp: 300 each, Rfl: 3    blood-glucose meter (TRUE METRIX GLUCOSE METER) kit, Use as instructed to test blood glucose meter daily., Disp: 1 each, Rfl: 1    brinzolamide-brimonidine (SIMBRINZA) 1-0.2 % DrpS, Place 1 drop into both eyes 3 (three) times daily., Disp: 8 mL, Rfl: 6    fluticasone-umeclidin-vilanter (TRELEGY ELLIPTA) 200-62.5-25 mcg inhaler, Inhale 1 puff into the lungs once daily., Disp: 360 each, Rfl: 5    furosemide (LASIX) 40 MG tablet, TAKE 2 TABLETS(80 MG) BY MOUTH TWICE DAILY, Disp: 60 tablet, Rfl: 0    gabapentin (NEURONTIN) 800 MG tablet, Take 1 tablet (800 mg total) by mouth 3 (three) times daily., Disp: 270 tablet, Rfl: 4    HYDROcodone-acetaminophen (NORCO)  mg per tablet, Take 1 tablet by mouth every 4 (four) hours as needed for Pain., Disp: 12 tablet, Rfl: 0    insulin aspart U-100 (NOVOLOG FLEXPEN U-100 INSULIN) 100 unit/mL (3 mL) InPn pen, INJECT 10 UNITS SUBCUTANEOUSLY THREE TIMES DAILY WITH MEALS, Disp: 15 mL, Rfl: 0    JARDIANCE 25 mg tablet, TAKE 1 TABLET EVERY DAY,  "Disp: 90 tablet, Rfl: 1    latanoprost 0.005 % ophthalmic solution, INSTILL 1 DROP INTO BOTH EYES ONE TIME DAILY BOTTLE EXPIRES 42 DAYS AFTER OPENING..., Disp: 7.5 mL, Rfl: 3    lisinopriL (PRINIVIL,ZESTRIL) 30 MG tablet, TAKE 1 TABLET EVERY DAY, Disp: 90 tablet, Rfl: 2    lisinopriL 10 MG tablet, TAKE 1 TABLET EVERY DAY, Disp: 90 tablet, Rfl: 2    MICRO THIN LANCETS 33 gauge Misc, , Disp: , Rfl:     naproxen (NAPROSYN) 500 MG tablet, Take 1 tablet (500 mg total) by mouth 2 (two) times daily with meals., Disp: 20 tablet, Rfl: 0    omalizumab (XOLAIR) 150 mg/mL injection, Inject 2 mLs (300 mg total) into the skin every 14 (fourteen) days., Disp: 4 each, Rfl: 11    omalizumab (XOLAIR) 75 mg/0.5 mL injection, Inject 0.5 mLs (75 mg total) into the skin every 14 (fourteen) days., Disp: 2 each, Rfl: 11    pantoprazole (PROTONIX) 40 MG tablet, Take 1 tablet (40 mg total) by mouth once daily., Disp: 90 tablet, Rfl: 1    papaverine-phentolamin-alprost 150 mg-5 mg- 50 mcg SolR, 0.5 mLs by Intracavernosal route as needed (ED)., Disp: 10 each, Rfl: ml    pen needle, diabetic (BD ULTRA-FINE SHORT PEN NEEDLE) 31 gauge x 5/16" Ndle, 1 each by Misc.(Non-Drug; Combo Route) route 3 (three) times daily., Disp: 300 each, Rfl: 3    ranolazine (RANEXA) 1,000 mg Tb12, Take 1 tablet (1,000 mg total) by mouth 2 (two) times daily., Disp: 60 tablet, Rfl: 11    semaglutide (OZEMPIC) 2 mg/dose (8 mg/3 mL) PnIj, Inject 2 mg into the skin every 7 days., Disp: 9 mL, Rfl: 3    sertraline (ZOLOFT) 100 MG tablet, TAKE 1 TABLET EVERY EVENING, Disp: 90 tablet, Rfl: 4    tamsulosin (FLOMAX) 0.4 mg Cap, Take 1 capsule (0.4 mg total) by mouth once daily., Disp: 90 capsule, Rfl: 1    traZODone (DESYREL) 100 MG tablet, TAKE 2 TABLETS EVERY EVENING, Disp: 180 tablet, Rfl: 4    VIOS AEROSOL DELIVERY SYSTEM Shefali, USE AS DIRESTED, Disp: , Rfl: 0    EPINEPHrine (EPIPEN) 0.3 mg/0.3 mL AtIn, Inject 0.3 mLs (0.3 mg total) into the muscle once. for 1 dose, Disp: 2 " each, Rfl: 3  No current facility-administered medications for this visit.    Review of Systems   Constitutional:  Negative for activity change, appetite change, chills, diaphoresis, fatigue, fever and unexpected weight change.   HENT: Negative.  Negative for congestion, hearing loss, postnasal drip, rhinorrhea, sore throat, trouble swallowing and voice change.    Eyes: Negative.  Negative for visual disturbance.   Respiratory: Negative.  Negative for cough, choking, chest tightness and shortness of breath.    Cardiovascular:  Negative for chest pain, palpitations and leg swelling.   Gastrointestinal:  Negative for abdominal distention, abdominal pain, blood in stool, constipation, diarrhea, nausea and vomiting.   Endocrine: Negative for cold intolerance, heat intolerance, polydipsia and polyuria.   Genitourinary: Negative.  Negative for difficulty urinating and frequency.   Musculoskeletal:  Positive for arthralgias, back pain, gait problem and myalgias. Negative for joint swelling, neck pain and neck stiffness.   Skin:  Negative for color change, pallor, rash and wound.   Neurological:  Negative for dizziness, tremors, weakness, light-headedness, numbness and headaches.   Hematological:  Negative for adenopathy.   Psychiatric/Behavioral:  Negative for behavioral problems, confusion, self-injury, sleep disturbance and suicidal ideas. The patient is not nervous/anxious.    Objective:   /72 (BP Location: Right arm, Patient Position: Sitting, BP Method: Large (Manual))   Pulse 76   Temp 98 °F (36.7 °C)   Resp 18   Wt 84 kg (185 lb 3 oz)   SpO2 96%   BMI 30.82 kg/m²     Physical Exam  Vitals and nursing note reviewed.   Constitutional:       General: He is not in acute distress.     Appearance: Normal appearance. He is well-developed. He is not ill-appearing, toxic-appearing or diaphoretic.   HENT:      Head: Normocephalic and atraumatic.   Cardiovascular:      Rate and Rhythm: Normal rate and regular  rhythm.      Heart sounds: Normal heart sounds. No murmur heard.    No friction rub. No gallop.   Pulmonary:      Effort: Pulmonary effort is normal. No respiratory distress.      Breath sounds: Normal breath sounds. No wheezing or rales.   Skin:     General: Skin is warm.      Findings: No rash.   Neurological:      Mental Status: He is alert and oriented to person, place, and time.   Psychiatric:         Mood and Affect: Mood normal.         Behavior: Behavior normal.         Thought Content: Thought content normal.         Judgment: Judgment normal.     30+ minutes of total time spent on the encounter, which includes face to face time and non-face to face time preparing to see the patient (eg, review of tests), Obtaining and/or reviewing separately obtained history, documenting clinical information in the electronic or other health record, independently interpreting results (not separately reported) and communicating results to the patient/family/caregiver, or Care coordination (not separately reported).    Assessment:     1. Hospital discharge follow-up    2. Acute on chronic respiratory failure with hypoxia    3. Chronic combined systolic and diastolic heart failure    4. Atherosclerosis of native coronary artery of native heart with unstable angina pectoris    5. Nonobstructive coronary artery disease of native artery of native heart    6. Coronary vasospasm    7. Atherosclerosis of aorta    8. PAF (paroxysmal atrial fibrillation)    9. Essential hypertension    10. Type 2 diabetes mellitus with mild nonproliferative retinopathy and macular edema, with long-term current use of insulin, unspecified laterality    11. HNP (herniated nucleus pulposus), lumbar    12. Chronic right shoulder pain    13. At risk for medication noncompliance    14. Sacroiliac dysfunction    15. Cocaine abuse    16. Substance abuse(UDS + for cocaine )      Plan:   Hospital discharge follow-up  -     Ambulatory referral/consult to  Cardiology    Acute on chronic respiratory failure with hypoxia    Chronic combined systolic and diastolic heart failure  -     Ambulatory referral/consult to Cardiology    Atherosclerosis of native coronary artery of native heart with unstable angina pectoris    Nonobstructive coronary artery disease of native artery of native heart    Coronary vasospasm    Atherosclerosis of aorta    PAF (paroxysmal atrial fibrillation)    Essential hypertension    Type 2 diabetes mellitus with mild nonproliferative retinopathy and macular edema, with long-term current use of insulin, unspecified laterality    HNP (herniated nucleus pulposus), lumbar    Chronic right shoulder pain    At risk for medication noncompliance    Sacroiliac dysfunction    Cocaine abuse    Substance abuse(UDS + for cocaine )    -information on rehab printed out for him today on his after visit summary.   -see neurosurg as scheduled.   -bp stable and controlled. Normal exam today. No other complaints/issues from patient.   -follow up as scheduled.     Follow up if symptoms worsen or fail to improve.

## 2023-06-01 RX ORDER — RANOLAZINE 1000 MG/1
TABLET, EXTENDED RELEASE ORAL
Qty: 180 TABLET | Refills: 2 | Status: SHIPPED | OUTPATIENT
Start: 2023-06-01

## 2023-06-10 DIAGNOSIS — I25.118 CORONARY ARTERY DISEASE OF NATIVE ARTERY OF NATIVE HEART WITH STABLE ANGINA PECTORIS: ICD-10-CM

## 2023-06-12 ENCOUNTER — TELEPHONE (OUTPATIENT)
Dept: PULMONOLOGY | Facility: CLINIC | Age: 68
End: 2023-06-12
Payer: MEDICARE

## 2023-06-12 NOTE — TELEPHONE ENCOUNTER
Chronic Disease Management:   Called patient to confirm Pulmonary Disease Management appointment. No answer.

## 2023-06-13 RX ORDER — APIXABAN 5 MG/1
TABLET, FILM COATED ORAL
Qty: 180 TABLET | Refills: 2 | Status: SHIPPED | OUTPATIENT
Start: 2023-06-13 | End: 2024-04-03

## 2023-06-14 ENCOUNTER — PATIENT MESSAGE (OUTPATIENT)
Dept: PODIATRY | Facility: CLINIC | Age: 68
End: 2023-06-14
Payer: MEDICARE

## 2023-06-18 DIAGNOSIS — M51.26 HNP (HERNIATED NUCLEUS PULPOSUS), LUMBAR: ICD-10-CM

## 2023-06-18 DIAGNOSIS — M47.26 OSTEOARTHRITIS OF SPINE WITH RADICULOPATHY, LUMBAR REGION: ICD-10-CM

## 2023-06-20 ENCOUNTER — TELEPHONE (OUTPATIENT)
Dept: NEUROSURGERY | Facility: CLINIC | Age: 68
End: 2023-06-20
Payer: MEDICARE

## 2023-06-20 RX ORDER — GABAPENTIN 800 MG/1
TABLET ORAL
Qty: 270 TABLET | Refills: 4 | Status: SHIPPED | OUTPATIENT
Start: 2023-06-20 | End: 2024-01-10 | Stop reason: SDUPTHER

## 2023-06-20 NOTE — TELEPHONE ENCOUNTER
----- Message from Alba Birmingham sent at 6/20/2023  4:06 PM CDT -----  Contact: Crow Breen is calling to reschedule today's appointment due to not being aware it was scheduled, please call patient back at [phone] or [phone]

## 2023-06-20 NOTE — TELEPHONE ENCOUNTER
Returned pt call pt states that he forgot his appt was today I was able to r/s pts appt left  for pt with his new appt

## 2023-06-29 ENCOUNTER — TELEPHONE (OUTPATIENT)
Dept: UROLOGY | Facility: CLINIC | Age: 68
End: 2023-06-29
Payer: MEDICARE

## 2023-07-07 ENCOUNTER — OFFICE VISIT (OUTPATIENT)
Dept: CARDIOLOGY | Facility: CLINIC | Age: 68
End: 2023-07-07
Payer: MEDICARE

## 2023-07-07 ENCOUNTER — PATIENT MESSAGE (OUTPATIENT)
Dept: INFECTIOUS DISEASES | Facility: CLINIC | Age: 68
End: 2023-07-07
Payer: MEDICARE

## 2023-07-07 VITALS
BODY MASS INDEX: 31.74 KG/M2 | DIASTOLIC BLOOD PRESSURE: 78 MMHG | WEIGHT: 190.5 LBS | HEART RATE: 78 BPM | OXYGEN SATURATION: 96 % | SYSTOLIC BLOOD PRESSURE: 122 MMHG | HEIGHT: 65 IN

## 2023-07-07 DIAGNOSIS — I50.42 CHRONIC COMBINED SYSTOLIC AND DIASTOLIC CONGESTIVE HEART FAILURE: ICD-10-CM

## 2023-07-07 DIAGNOSIS — I70.0 ATHEROSCLEROSIS OF AORTA: ICD-10-CM

## 2023-07-07 DIAGNOSIS — E11.42 DIABETIC POLYNEUROPATHY ASSOCIATED WITH TYPE 2 DIABETES MELLITUS: ICD-10-CM

## 2023-07-07 DIAGNOSIS — I10 ESSENTIAL HYPERTENSION: ICD-10-CM

## 2023-07-07 DIAGNOSIS — I50.42 CHRONIC COMBINED SYSTOLIC AND DIASTOLIC HEART FAILURE: ICD-10-CM

## 2023-07-07 DIAGNOSIS — I25.110 ATHEROSCLEROSIS OF NATIVE CORONARY ARTERY OF NATIVE HEART WITH UNSTABLE ANGINA PECTORIS: ICD-10-CM

## 2023-07-07 DIAGNOSIS — I25.118 CORONARY ARTERY DISEASE OF NATIVE ARTERY OF NATIVE HEART WITH STABLE ANGINA PECTORIS: Primary | Chronic | ICD-10-CM

## 2023-07-07 PROCEDURE — 3044F PR MOST RECENT HEMOGLOBIN A1C LEVEL <7.0%: ICD-10-PCS | Mod: CPTII,S$GLB,, | Performed by: INTERNAL MEDICINE

## 2023-07-07 PROCEDURE — 3288F PR FALLS RISK ASSESSMENT DOCUMENTED: ICD-10-PCS | Mod: CPTII,S$GLB,, | Performed by: INTERNAL MEDICINE

## 2023-07-07 PROCEDURE — 3044F HG A1C LEVEL LT 7.0%: CPT | Mod: CPTII,S$GLB,, | Performed by: INTERNAL MEDICINE

## 2023-07-07 PROCEDURE — 99215 OFFICE O/P EST HI 40 MIN: CPT | Mod: S$GLB,,, | Performed by: INTERNAL MEDICINE

## 2023-07-07 PROCEDURE — 1160F RVW MEDS BY RX/DR IN RCRD: CPT | Mod: CPTII,S$GLB,, | Performed by: INTERNAL MEDICINE

## 2023-07-07 PROCEDURE — 1160F PR REVIEW ALL MEDS BY PRESCRIBER/CLIN PHARMACIST DOCUMENTED: ICD-10-PCS | Mod: CPTII,S$GLB,, | Performed by: INTERNAL MEDICINE

## 2023-07-07 PROCEDURE — 99215 PR OFFICE/OUTPT VISIT, EST, LEVL V, 40-54 MIN: ICD-10-PCS | Mod: S$GLB,,, | Performed by: INTERNAL MEDICINE

## 2023-07-07 PROCEDURE — 3008F BODY MASS INDEX DOCD: CPT | Mod: CPTII,S$GLB,, | Performed by: INTERNAL MEDICINE

## 2023-07-07 PROCEDURE — 1101F PT FALLS ASSESS-DOCD LE1/YR: CPT | Mod: CPTII,S$GLB,, | Performed by: INTERNAL MEDICINE

## 2023-07-07 PROCEDURE — 3074F PR MOST RECENT SYSTOLIC BLOOD PRESSURE < 130 MM HG: ICD-10-PCS | Mod: CPTII,S$GLB,, | Performed by: INTERNAL MEDICINE

## 2023-07-07 PROCEDURE — 4010F ACE/ARB THERAPY RXD/TAKEN: CPT | Mod: CPTII,S$GLB,, | Performed by: INTERNAL MEDICINE

## 2023-07-07 PROCEDURE — 3008F PR BODY MASS INDEX (BMI) DOCUMENTED: ICD-10-PCS | Mod: CPTII,S$GLB,, | Performed by: INTERNAL MEDICINE

## 2023-07-07 PROCEDURE — 99999 PR PBB SHADOW E&M-EST. PATIENT-LVL V: ICD-10-PCS | Mod: PBBFAC,,, | Performed by: INTERNAL MEDICINE

## 2023-07-07 PROCEDURE — 3074F SYST BP LT 130 MM HG: CPT | Mod: CPTII,S$GLB,, | Performed by: INTERNAL MEDICINE

## 2023-07-07 PROCEDURE — 3288F FALL RISK ASSESSMENT DOCD: CPT | Mod: CPTII,S$GLB,, | Performed by: INTERNAL MEDICINE

## 2023-07-07 PROCEDURE — 1126F AMNT PAIN NOTED NONE PRSNT: CPT | Mod: CPTII,S$GLB,, | Performed by: INTERNAL MEDICINE

## 2023-07-07 PROCEDURE — 1101F PR PT FALLS ASSESS DOC 0-1 FALLS W/OUT INJ PAST YR: ICD-10-PCS | Mod: CPTII,S$GLB,, | Performed by: INTERNAL MEDICINE

## 2023-07-07 PROCEDURE — 4010F PR ACE/ARB THEARPY RXD/TAKEN: ICD-10-PCS | Mod: CPTII,S$GLB,, | Performed by: INTERNAL MEDICINE

## 2023-07-07 PROCEDURE — 99999 PR PBB SHADOW E&M-EST. PATIENT-LVL V: CPT | Mod: PBBFAC,,, | Performed by: INTERNAL MEDICINE

## 2023-07-07 PROCEDURE — 1126F PR PAIN SEVERITY QUANTIFIED, NO PAIN PRESENT: ICD-10-PCS | Mod: CPTII,S$GLB,, | Performed by: INTERNAL MEDICINE

## 2023-07-07 PROCEDURE — 1159F PR MEDICATION LIST DOCUMENTED IN MEDICAL RECORD: ICD-10-PCS | Mod: CPTII,S$GLB,, | Performed by: INTERNAL MEDICINE

## 2023-07-07 PROCEDURE — 1159F MED LIST DOCD IN RCRD: CPT | Mod: CPTII,S$GLB,, | Performed by: INTERNAL MEDICINE

## 2023-07-07 PROCEDURE — 3078F DIAST BP <80 MM HG: CPT | Mod: CPTII,S$GLB,, | Performed by: INTERNAL MEDICINE

## 2023-07-07 PROCEDURE — 3078F PR MOST RECENT DIASTOLIC BLOOD PRESSURE < 80 MM HG: ICD-10-PCS | Mod: CPTII,S$GLB,, | Performed by: INTERNAL MEDICINE

## 2023-07-07 RX ORDER — FUROSEMIDE 40 MG/1
80 TABLET ORAL 2 TIMES DAILY
Qty: 120 TABLET | Refills: 10 | Status: SHIPPED | OUTPATIENT
Start: 2023-07-07

## 2023-07-07 NOTE — PROGRESS NOTES
Subjective:   Patient ID:  Crow Green is a 68 y.o. male who presents for follow up of Chest Pain (Experiencing some sharp chest pain since monday) and Shortness of Breath      67 yo AAM, came in for routine f/u  PMH CAD nonobstructive and small vessel Dz per LHC done in 11/16, PAF s/p PVI in  by Dr. Brown, CHFpEF 45%, DM, hTN, HLD and obesity. Asthma and SANDRITA on CPAP f/u with Dr. Alicea.    -11/2016, Pt was admitted to [facility] for cough with little sputum for 6 weeks. Had severe left chest pain only with cough and sitting up from the bed. No pain with exercise. Denied palpitation, dizziness, orthopnea, PND and syncope. Troponin up to 0.218 and trending down. Echo showed EF 45% and MPI showed anteroseptal positive.   Subsequent LHC showed d2 30% lesion and otherwise demi Dz.   -03/2017, he was admitted to [facility] for acute cholelithiasis, s/p lap aidan. Pt had PAF during the admisssion and has been on amiodarone and Eliquis. BNP was up to 144 from 44 done five months ago.  -05/2018 at [facility], MPI showed SPECT images at rest and stress studies showed a moderate-sized, moderate anteroseptal perfusion defect and a large, severe inferior wall perfusion defect that are fixed. The gated stress study demonstrated a left ventricular ejection fraction of 35%, and ECHo showed EF 50%. Chest CTA showed no PE.  -2018, had urine drug presumptive cocaine positive but pt declined to use cocaine.   08/2019 afib with rvr during PFT, and [facility] to [facility] and [facility]iness.TROPONIN 0.07 AND FLAT. Converted to sinus after cardizem and BB. F/u with Dr. Brown on 08/15 and advised PVI if recurrent Afib.   echo showed EF 45%.  S/p Afib ablation in  by Dr. Brown  Lives with his dad     visit  05 and  twice admitted for chest pain. EKG no acute stt change. troponin 0.199 and flat.    ECHO EF 40%, MPI no ischemia  C/o cp 3 times a week. Lasted for seconds at rest. With SOB,  dizziness.   Pt had quick chest pain for seconds when he was sitting in the office today.     08/2021 visit  Admitted again in  for chest pain  Drug screen cocaine positive. 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 No headache in PM.   NO recurrent rx. BP low. Visiting nurse helped to prepare the medications for 2 weeks and pt is medication compliant     visit  On scooter for few months due to hip pain and legs weakness  Breathing rx per pulm service.  No leg swelling, chest pain. Dizziness in AM. Med compliance     visit  Left shoulder pain improved after shoulder procedure and rehab  imbalance after the toe removal.   No chest angina pain. No NTG SL prn since nov 2021  SOB controlled by inhaler and breathing rx  No leg swelling. No smoking med compliance  EKG NSR and old anterior infarct    07-22 visit  Progressively more frequent angina, needs twice taking NTG SL. Occurred at rest.  No dyspnea palpitation,   Some dizziness and headachce. LDL 88 and A1c 6.4   echo EF 45% and mod LVH    Interval history  Run out of lasix for two weeks. Taking Lisinopril and forgot why not on entresto  BNP was 97 in 05/23 and was at 200 to 300 levels in 2022.   Had headache and blurred vision. Some sob.  Soul and back pain         Past Medical History:   Diagnosis Date    Arthritis     Asthma     Atrial fibrillation     Atrial fibrillation with RVR 08/07/2019    Carotid artery stenosis     CHF (congestive heart failure)     Chronic obstructive pulmonary disease 08/05/2020    Depression     Dermatomyositis     Diabetes mellitus, type 2 Diagnosed in 2000    Elevated PSA     Follow up 07/30/2020    Hypertension     Myocardial infarction     2017    Sleep apnea        Past Surgical History:   Procedure Laterality Date    ABLATION OF ARRHYTHMOGENIC FOCUS FOR ATRIAL FIBRILLATION N/A 2/27/2020    Procedure:  Ablation atrial fibrillation;  Surgeon: Gio Brown MD;  Location: [facility] EP LAB;  Service: Cardiology;  Laterality: N/A;  afib, DAMIEN (cx if SR), PVI, PITO, anes, MB, 3 Prep    CHOLECYSTECTOMY      CLOSURE OF WOUND Left 7/1/2020    Procedure: CLOSURE, WOUND;  Surgeon: Barb Veras DPM;  Location: [facility] OR;  Service: Podiatry;  Laterality: Left;    COLONOSCOPY N/A 3/4/2022    Procedure: COLONOSCOPY;  Surgeon: Yolis Freitas MD;  Location: [facility];  Service: Endoscopy;  Laterality: N/A;    ESOPHAGOGASTRODUODENOSCOPY N/A 3/4/2022    Procedure: EGD (ESOPHAGOGASTRODUODENOSCOPY);  Surgeon: Yolis Freitas MD;  Location: [facility];  Service: Endoscopy;  Laterality: N/A;    INJECTION OF ANESTHETIC AGENT INTO SACROILIAC JOINT Left 3/24/2021    Procedure: Left BLOCK, SACROILIAC JOINT and bilateral rhomboid TPI;  Surgeon: Dillan Tsai MD;  Location: [facility] PAIN MGT;  Service: Pain Management;  Laterality: Left;    INTRALUMINAL GASTROINTESTINAL TRACT IMAGING VIA CAPSULE N/A 3/22/2022    Procedure: IMAGING PROCEDURE, GI TRACT, INTRALUMINAL, VIA CAPSULE;  Surgeon: Justice Martinez RN;  Location: [facility];  Service: Endoscopy;  Laterality: N/A;    REVERSE TOTAL SHOULDER ARTHROPLASTY Left 1/10/2022    Procedure: ARTHROPLASTY, SHOULDER, TOTAL, REVERSE;  Surgeon: Anthony Alvarado MD;  Location: [facility];  Service: Orthopedics;  Laterality: Left;  left reverse total shoulder arthroplasty     SELECTIVE INJECTION OF ANESTHETIC AGENT AROUND LUMBAR SPINAL NERVE ROOT BY TRANSFORAMINAL APPROACH Left 6/11/2020    Procedure: BLOCK, SPINAL NERVE ROOT, LUMBAR, SELECTIVE, TRANSFORAMINAL APPROACH Left L4-5, L5-S1 TFESI with RN IV sedation;  Surgeon: Dillan Tsai MD;  Location: [facility] PAIN MGT;  Service: Pain Management;  Laterality: Left;    TOE AMPUTATION Left 6/29/2020    Procedure: AMPUTATION, TOE;  Surgeon: Barb Veras DPM;  Location: [facility] OR;  Service: Podiatry;  Laterality: Left;  great toe       Social History      Tobacco Use    Smoking status: Never    Smokeless tobacco: Never   Substance Use Topics    Alcohol use: Yes     Comment: rare, maybe holidays    Drug use: Not Currently     Types: Cocaine       Family History   Problem Relation Age of Onset    Hypertension Mother     Glaucoma Mother     Cataracts Mother     Diabetes Mother     Cataracts Father     Stroke Father     Glaucoma Sister         x3    Cataracts Sister         x3    Diabetes Sister         x1    Stroke Sister         x1    No Known Problems Brother     No Known Problems Daughter     No Known Problems Son     Glaucoma Maternal Aunt     Diabetes Maternal Aunt     Cancer Maternal Grandfather         lung (smoker)    Prostate cancer Neg Hx     Heart disease Neg Hx     Kidney disease Neg Hx          Review of Systems   Eyes:  Positive for blurred vision.   Neurological:  Positive for headaches.     Objective:   Physical Exam  HENT:      Head: Normocephalic.   Eyes:      Pupils: Pupils are equal, round, and reactive to light.   Neck:      Thyroid: No thyromegaly.      Vascular: Normal carotid pulses. No carotid bruit or JVD.   Cardiovascular:      Rate and Rhythm: Normal rate and regular rhythm. No extrasystoles are present.     Chest Wall: PMI is not displaced.      Pulses: Normal pulses.      Heart sounds: Normal heart sounds. No murmur heard.    No gallop. No S3 sounds.   Pulmonary:      Effort: No respiratory distress.      Breath sounds: Normal breath sounds. No stridor.   Abdominal:      General: Bowel sounds are normal.      Palpations: Abdomen is soft.      Tenderness: There is no abdominal tenderness. There is no rebound.   Musculoskeletal:      Comments:      Skin:     Findings: No rash.   Neurological:      Mental Status: He is alert and oriented to person, place, and time.   Psychiatric:         Behavior: Behavior normal.     Lab Results   Component Value Date    CHOL 103 05/23/2023    CHOL 152 06/10/2022    CHOL 116 (L) 05/25/2021     Lab Results    Component Value Date    HDL 41 05/23/2023    HDL 43 06/10/2022    HDL 31 (L) 05/25/2021     Lab Results   Component Value Date    LDLCALC 46 05/23/2023    LDLCALC 88.8 06/10/2022    LDLCALC 62.6 (L) 05/25/2021     Lab Results   Component Value Date    TRIG 80 05/23/2023    TRIG 101 06/10/2022    TRIG 112 05/25/2021     Lab Results   Component Value Date    CHOLHDL 28.3 06/10/2022    CHOLHDL 26.7 05/25/2021    CHOLHDL 27.1 05/24/2021       Chemistry        Component Value Date/Time     03/31/2023 0318    K 3.4 (L) 03/31/2023 0318     03/31/2023 0318    CO2 24 03/31/2023 0318    BUN 17 03/31/2023 0318    CREATININE 0.9 03/31/2023 0318     03/31/2023 0318        Component Value Date/Time    CALCIUM 8.7 03/31/2023 0318    ALKPHOS 69 03/31/2023 0318    AST 15 03/31/2023 0318    ALT 12 03/31/2023 0318    BILITOT 0.2 03/31/2023 0318    ESTGFRAFRICA >60 07/31/2022 0634    EGFRNONAA >60 07/31/2022 0634          Lab Results   Component Value Date    HGBA1C 6.2 05/23/2023     Lab Results   Component Value Date    TSH 0.881 05/23/2023     Lab Results   Component Value Date    INR 1.4 05/22/2023    INR 1.0 11/07/2022    INR 1.0 09/23/2022     Lab Results   Component Value Date    WBC 8.00 03/31/2023    HGB 11.2 (L) 03/31/2023    HCT 35.0 (L) 03/31/2023    MCV 80 (L) 03/31/2023     03/31/2023     BMP  Sodium   Date Value Ref Range Status   03/31/2023 139 136 - 145 mmol/L Final     Potassium   Date Value Ref Range Status   03/31/2023 3.4 (L) 3.5 - 5.1 mmol/L Final     Chloride   Date Value Ref Range Status   03/31/2023 104 95 - 110 mmol/L Final     CO2   Date Value Ref Range Status   03/31/2023 24 23 - 29 mmol/L Final     BUN   Date Value Ref Range Status   03/31/2023 17 8 - 23 mg/dL Final     Creatinine   Date Value Ref Range Status   03/31/2023 0.9 0.5 - 1.4 mg/dL Final     Calcium   Date Value Ref Range Status   03/31/2023 8.7 8.7 - 10.5 mg/dL Final     Anion Gap   Date Value Ref Range Status    03/31/2023 11 8 - 16 mmol/L Final     eGFR if    Date Value Ref Range Status   07/31/2022 >60 >60 mL/min/1.73 m^2 Final     eGFR if non    Date Value Ref Range Status   07/31/2022 >60 >60 mL/min/1.73 m^2 Final     Comment:     Calculation used to obtain the estimated glomerular filtration  rate (eGFR) is the CKD-EPI equation.        BNP  @LABRCNTIP(BNP,BNPTRIAGEBLO)@  @LABRCNTIP(troponini)@  CrCl cannot be calculated (Patient's most recent lab result is older than the maximum 7 days allowed.).  No results found in the last 24 hours.  No results found in the last 24 hours.  No results found in the last 24 hours.    Assessment:      1. Nonobstructive coronary artery disease of native artery of native heart    2. Chronic combined systolic and diastolic congestive heart failure    3. Essential hypertension    4. Chronic combined systolic and diastolic heart failure    5. Atherosclerosis of native coronary artery of native heart with unstable angina pectoris    6. Atherosclerosis of aorta    7. Diabetic polyneuropathy associated with type 2 diabetes mellitus        Plan:   Today c/o headache blurred vision and SOB  Send to ER  ER staff notified

## 2023-07-13 ENCOUNTER — OFFICE VISIT (OUTPATIENT)
Dept: INTERNAL MEDICINE | Facility: CLINIC | Age: 68
End: 2023-07-13
Payer: MEDICARE

## 2023-07-13 VITALS
SYSTOLIC BLOOD PRESSURE: 136 MMHG | BODY MASS INDEX: 33.06 KG/M2 | TEMPERATURE: 97 F | OXYGEN SATURATION: 92 % | DIASTOLIC BLOOD PRESSURE: 74 MMHG | HEART RATE: 83 BPM | HEIGHT: 65 IN | WEIGHT: 198.44 LBS

## 2023-07-13 DIAGNOSIS — I50.42 CHRONIC COMBINED SYSTOLIC AND DIASTOLIC HEART FAILURE: ICD-10-CM

## 2023-07-13 DIAGNOSIS — F33.1 MDD (MAJOR DEPRESSIVE DISORDER), RECURRENT EPISODE, MODERATE: ICD-10-CM

## 2023-07-13 DIAGNOSIS — L08.9 INFECTED SEBACEOUS CYST: Primary | ICD-10-CM

## 2023-07-13 DIAGNOSIS — G89.29 CHRONIC PAIN OF BOTH SHOULDERS: ICD-10-CM

## 2023-07-13 DIAGNOSIS — R06.02 SHORTNESS OF BREATH: ICD-10-CM

## 2023-07-13 DIAGNOSIS — Z91.89 AT RISK FOR MEDICATION NONCOMPLIANCE: ICD-10-CM

## 2023-07-13 DIAGNOSIS — M25.511 CHRONIC PAIN OF BOTH SHOULDERS: ICD-10-CM

## 2023-07-13 DIAGNOSIS — E11.65 UNCONTROLLED TYPE 2 DIABETES MELLITUS WITH HYPERGLYCEMIA: ICD-10-CM

## 2023-07-13 DIAGNOSIS — R29.898 SHOULDER WEAKNESS: ICD-10-CM

## 2023-07-13 DIAGNOSIS — K21.9 GASTROESOPHAGEAL REFLUX DISEASE WITHOUT ESOPHAGITIS: ICD-10-CM

## 2023-07-13 DIAGNOSIS — R26.81 GAIT INSTABILITY: ICD-10-CM

## 2023-07-13 DIAGNOSIS — F41.1 GAD (GENERALIZED ANXIETY DISORDER): ICD-10-CM

## 2023-07-13 DIAGNOSIS — L72.3 INFECTED SEBACEOUS CYST: Primary | ICD-10-CM

## 2023-07-13 DIAGNOSIS — M25.512 CHRONIC PAIN OF BOTH SHOULDERS: ICD-10-CM

## 2023-07-13 PROCEDURE — 3075F SYST BP GE 130 - 139MM HG: CPT | Mod: CPTII,S$GLB,, | Performed by: PHYSICIAN ASSISTANT

## 2023-07-13 PROCEDURE — 3078F DIAST BP <80 MM HG: CPT | Mod: CPTII,S$GLB,, | Performed by: PHYSICIAN ASSISTANT

## 2023-07-13 PROCEDURE — 4010F ACE/ARB THERAPY RXD/TAKEN: CPT | Mod: CPTII,S$GLB,, | Performed by: PHYSICIAN ASSISTANT

## 2023-07-13 PROCEDURE — 3075F PR MOST RECENT SYSTOLIC BLOOD PRESS GE 130-139MM HG: ICD-10-PCS | Mod: CPTII,S$GLB,, | Performed by: PHYSICIAN ASSISTANT

## 2023-07-13 PROCEDURE — 3044F PR MOST RECENT HEMOGLOBIN A1C LEVEL <7.0%: ICD-10-PCS | Mod: CPTII,S$GLB,, | Performed by: PHYSICIAN ASSISTANT

## 2023-07-13 PROCEDURE — 1159F PR MEDICATION LIST DOCUMENTED IN MEDICAL RECORD: ICD-10-PCS | Mod: CPTII,S$GLB,, | Performed by: PHYSICIAN ASSISTANT

## 2023-07-13 PROCEDURE — 1160F PR REVIEW ALL MEDS BY PRESCRIBER/CLIN PHARMACIST DOCUMENTED: ICD-10-PCS | Mod: CPTII,S$GLB,, | Performed by: PHYSICIAN ASSISTANT

## 2023-07-13 PROCEDURE — 1160F RVW MEDS BY RX/DR IN RCRD: CPT | Mod: CPTII,S$GLB,, | Performed by: PHYSICIAN ASSISTANT

## 2023-07-13 PROCEDURE — 99214 OFFICE O/P EST MOD 30 MIN: CPT | Mod: S$GLB,,, | Performed by: PHYSICIAN ASSISTANT

## 2023-07-13 PROCEDURE — 99214 PR OFFICE/OUTPT VISIT, EST, LEVL IV, 30-39 MIN: ICD-10-PCS | Mod: S$GLB,,, | Performed by: PHYSICIAN ASSISTANT

## 2023-07-13 PROCEDURE — 1125F PR PAIN SEVERITY QUANTIFIED, PAIN PRESENT: ICD-10-PCS | Mod: CPTII,S$GLB,, | Performed by: PHYSICIAN ASSISTANT

## 2023-07-13 PROCEDURE — 1159F MED LIST DOCD IN RCRD: CPT | Mod: CPTII,S$GLB,, | Performed by: PHYSICIAN ASSISTANT

## 2023-07-13 PROCEDURE — 1125F AMNT PAIN NOTED PAIN PRSNT: CPT | Mod: CPTII,S$GLB,, | Performed by: PHYSICIAN ASSISTANT

## 2023-07-13 PROCEDURE — 99999 PR PBB SHADOW E&M-EST. PATIENT-LVL V: CPT | Mod: PBBFAC,,, | Performed by: PHYSICIAN ASSISTANT

## 2023-07-13 PROCEDURE — 99999 PR PBB SHADOW E&M-EST. PATIENT-LVL V: ICD-10-PCS | Mod: PBBFAC,,, | Performed by: PHYSICIAN ASSISTANT

## 2023-07-13 PROCEDURE — 4010F PR ACE/ARB THEARPY RXD/TAKEN: ICD-10-PCS | Mod: CPTII,S$GLB,, | Performed by: PHYSICIAN ASSISTANT

## 2023-07-13 PROCEDURE — 3044F HG A1C LEVEL LT 7.0%: CPT | Mod: CPTII,S$GLB,, | Performed by: PHYSICIAN ASSISTANT

## 2023-07-13 PROCEDURE — 3078F PR MOST RECENT DIASTOLIC BLOOD PRESSURE < 80 MM HG: ICD-10-PCS | Mod: CPTII,S$GLB,, | Performed by: PHYSICIAN ASSISTANT

## 2023-07-13 PROCEDURE — 3008F BODY MASS INDEX DOCD: CPT | Mod: CPTII,S$GLB,, | Performed by: PHYSICIAN ASSISTANT

## 2023-07-13 PROCEDURE — 3008F PR BODY MASS INDEX (BMI) DOCUMENTED: ICD-10-PCS | Mod: CPTII,S$GLB,, | Performed by: PHYSICIAN ASSISTANT

## 2023-07-13 RX ORDER — LANCETS 33 GAUGE
EACH MISCELLANEOUS
Qty: 100 EACH | Refills: 6 | Status: SHIPPED | OUTPATIENT
Start: 2023-07-13

## 2023-07-13 RX ORDER — SERTRALINE HYDROCHLORIDE 100 MG/1
100 TABLET, FILM COATED ORAL NIGHTLY
Qty: 90 TABLET | Refills: 4 | Status: SHIPPED | OUTPATIENT
Start: 2023-07-13

## 2023-07-13 RX ORDER — PANTOPRAZOLE SODIUM 40 MG/1
40 TABLET, DELAYED RELEASE ORAL DAILY
Qty: 90 TABLET | Refills: 1 | Status: SHIPPED | OUTPATIENT
Start: 2023-07-13

## 2023-07-13 RX ORDER — DOXYCYCLINE 100 MG/1
100 CAPSULE ORAL EVERY 12 HOURS
Qty: 20 CAPSULE | Refills: 0 | Status: SHIPPED | OUTPATIENT
Start: 2023-07-13 | End: 2023-07-24

## 2023-07-13 RX ORDER — DEXTROSE 4 G
TABLET,CHEWABLE ORAL
Qty: 1 EACH | Refills: 1 | Status: SHIPPED | OUTPATIENT
Start: 2023-07-13 | End: 2024-01-18 | Stop reason: SDUPTHER

## 2023-07-13 NOTE — PROGRESS NOTES
Subjective:      Patient ID: Crow Green is a 68 y.o. male.    Chief Complaint: boil under arm    Cyst  This is a new problem. The current episode started 1 to 4 weeks ago. The problem has been gradually improving. Associated symptoms include arthralgias, myalgias and neck pain. Pertinent negatives include no abdominal pain, chest pain, chills, congestion, coughing, diaphoresis, fatigue, fever, headaches, joint swelling, nausea, numbness, rash, sore throat, vomiting or weakness.   Noticed hard bumps under his bilateral armpits. Drained on its own. Left resolved. Right has a residual knot under his arm, no pain.     Reduced ROM in bilateral shoulders. Chronic but getting worse. Has not done pt.ot.   Seeing neurosurg for chronic back pain. Dismissed from pain clinic due to positive drug screen.     Some confusion with taking his meds. He reports that he is out of most of his meds but he has refills on file with his mail order service. Will order him home health to help.     Also worsening shortness of breath. Unable to do anything at home but rest. Unable to cook for himself because he gets so short of breath. Recently saw cardiology but was sent to ER. Does not have a follow up.   He is scheduled to see pulm.     Patient Active Problem List   Diagnosis    ED (erectile dysfunction)    Non-proliferative diabetic retinopathy, mild, both eyes    Essential hypertension    Diabetic polyneuropathy    Nonobstructive coronary artery disease of native artery of native heart    Chronic combined systolic and diastolic heart failure    Type 2 diabetes mellitus    SANDRITA (obstructive sleep apnea)    H/O Asthma    Psychophysiological insomnia    Nightmares    Sleep related gastroesophageal reflux disease    Inadequate sleep hygiene    Chest pain    PAF (paroxysmal atrial fibrillation)    At risk for medication noncompliance    Obesity (BMI 30.0-34.9)    Indeterminate stage chronic open angle glaucoma    Right hip pain     Gait instability    Chronic right shoulder pain    Arthritis    Left sided sciatica    Osteoarthritis of spine with radiculopathy, lumbar region    HNP (herniated nucleus pulposus), lumbar    Gastroesophageal reflux disease without esophagitis    Hypogonadism in male    Disorder of parathyroid gland    Hyperlipidemia associated with type 2 diabetes mellitus    Traumatic tear of left rotator cuff    Other chronic pain    Left shoulder pain    Complete tear of left rotator cuff    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 associated with type 2 diabetes mellitus    Lumbar radiculopathy    Diabetic ulcer of toe of left foot associated with type 2 diabetes mellitus, with necrosis of muscle    Ulcer of left foot    Elevated troponin    Hypotension due to medication    Dermatomyositis    Postprocedural hypotension    Advanced directives, counseling/discussion    Schizoaffective disorder, depressive type    Chronic obstructive pulmonary disease    MDD (major depressive disorder), recurrent episode, moderate    DOMINIK (generalized anxiety disorder)    Bilateral carpal tunnel syndrome    Rotator cuff tear arthropathy of left shoulder    Sacroiliac dysfunction    Atherosclerosis of native coronary artery of native heart with unstable angina pectoris    Coronary vasospasm    Cocaine abuse    Osteopenia    Atherosclerosis of aorta    Cardiac asthma    Chronic gout of multiple sites    Acquired absence of left great toe    Acute on chronic respiratory failure with hypoxia    Hypoxia    Cardiomyopathy    Bilateral carotid artery stenosis    Substance abuse(UDS + for cocaine )    Immunocompromised patient    Other reduced mobility    Abnormality of gait and mobility    Dependence on supplemental oxygen    Positive depression screening    Pulmonary hypertension- echo 7/2022         Current Outpatient Medications:     albuterol (PROVENTIL) 2.5 mg /3 mL (0.083 %) nebulizer solution, INHALE THE  CONTENTS OF 1 VIAL VIA NEBULIZER EVERY 4 TO 6 HOURS AS NEEDED FOR WHEEZING OR FOR SHORTNESS OF BREATH, Disp: 1080 mL, Rfl: 3    allopurinoL (ZYLOPRIM) 100 MG tablet, Take 1 tablet (100 mg total) by mouth once daily., Disp: 90 tablet, Rfl: 1    amLODIPine (NORVASC) 2.5 MG tablet, Take 1 tablet (2.5 mg total) by mouth once daily., Disp: 30 tablet, Rfl: 11    apixaban (ELIQUIS) 5 mg Tab, TAKE 1 TABLET TWICE DAILY, Disp: 180 tablet, Rfl: 2    aspirin (ECOTRIN) 81 MG EC tablet, Take 1 tablet (81 mg total) by mouth once daily., Disp: , Rfl:     atorvastatin (LIPITOR) 40 MG tablet, TAKE 1 TABLET BY MOUTH EVERY EVENING, Disp: 90 tablet, Rfl: 3    azaTHIOprine (IMURAN) 50 mg Tab, Take 1 tablet (50 mg total) by mouth once daily., Disp: 90 tablet, Rfl: 0    blood sugar diagnostic Strp, 1 each by Misc.(Non-Drug; Combo Route) route 4 (four) times daily., Disp: 300 each, Rfl: 3    blood-glucose meter Misc, Use as instructed to test blood glucose meter daily., Disp: 1 each, Rfl: 1    brinzolamide-brimonidine (SIMBRINZA) 1-0.2 % DrpS, Place 1 drop into both eyes 3 (three) times daily., Disp: 8 mL, Rfl: 6    fluticasone-umeclidin-vilanter (TRELEGY ELLIPTA) 200-62.5-25 mcg inhaler, Inhale 1 puff into the lungs once daily., Disp: 360 each, Rfl: 5    furosemide (LASIX) 40 MG tablet, Take 2 tablets (80 mg total) by mouth 2 (two) times a day., Disp: 120 tablet, Rfl: 10    gabapentin (NEURONTIN) 800 MG tablet, TAKE 1 TABLET(800 MG) BY MOUTH THREE TIMES DAILY, Disp: 270 tablet, Rfl: 4    HYDROcodone-acetaminophen (NORCO)  mg per tablet, Take 1 tablet by mouth every 4 (four) hours as needed for Pain., Disp: 12 tablet, Rfl: 0    insulin aspart U-100 (NOVOLOG FLEXPEN U-100 INSULIN) 100 unit/mL (3 mL) InPn pen, INJECT 10 UNITS SUBCUTANEOUSLY THREE TIMES DAILY WITH MEALS, Disp: 15 mL, Rfl: 0    JARDIANCE 25 mg tablet, TAKE 1 TABLET EVERY DAY, Disp: 90 tablet, Rfl: 1    lancets (TRUEPLUS LANCETS) 33 gauge Misc, Use to check blood sugar  "twice a day, Disp: 100 each, Rfl: 6    latanoprost 0.005 % ophthalmic solution, INSTILL 1 DROP INTO BOTH EYES ONE TIME DAILY BOTTLE EXPIRES 42 DAYS AFTER OPENING..., Disp: 7.5 mL, Rfl: 3    lisinopriL (PRINIVIL,ZESTRIL) 30 MG tablet, TAKE 1 TABLET EVERY DAY, Disp: 90 tablet, Rfl: 2    lisinopriL 10 MG tablet, TAKE 1 TABLET EVERY DAY, Disp: 90 tablet, Rfl: 2    naproxen (NAPROSYN) 500 MG tablet, Take 1 tablet (500 mg total) by mouth 2 (two) times daily with meals., Disp: 20 tablet, Rfl: 0    omalizumab (XOLAIR) 150 mg/mL injection, Inject 2 mLs (300 mg total) into the skin every 14 (fourteen) days., Disp: 4 each, Rfl: 11    omalizumab (XOLAIR) 75 mg/0.5 mL injection, Inject 0.5 mLs (75 mg total) into the skin every 14 (fourteen) days., Disp: 2 each, Rfl: 11    papaverine-phentolamin-alprost 150 mg-5 mg- 50 mcg SolR, 0.5 mLs by Intracavernosal route as needed (ED)., Disp: 10 each, Rfl: ml    pen needle, diabetic (BD ULTRA-FINE SHORT PEN NEEDLE) 31 gauge x 5/16" Ndle, 1 each by Misc.(Non-Drug; Combo Route) route 3 (three) times daily., Disp: 300 each, Rfl: 3    ranolazine (RANEXA) 1,000 mg Tb12, TAKE 1 TABLET TWICE DAILY, Disp: 180 tablet, Rfl: 2    semaglutide (OZEMPIC) 2 mg/dose (8 mg/3 mL) PnIj, Inject 2 mg into the skin every 7 days., Disp: 9 mL, Rfl: 3    traZODone (DESYREL) 100 MG tablet, TAKE 2 TABLETS EVERY EVENING, Disp: 180 tablet, Rfl: 4    VIOS AEROSOL DELIVERY SYSTEM Shefali, USE AS DIRESTED, Disp: , Rfl: 0    doxycycline (VIBRAMYCIN) 100 MG Cap, Take 1 capsule (100 mg total) by mouth every 12 (twelve) hours. for 10 days, Disp: 20 capsule, Rfl: 0    EPINEPHrine (EPIPEN) 0.3 mg/0.3 mL AtIn, Inject 0.3 mLs (0.3 mg total) into the muscle once. for 1 dose, Disp: 2 each, Rfl: 3    pantoprazole (PROTONIX) 40 MG tablet, Take 1 tablet (40 mg total) by mouth once daily., Disp: 90 tablet, Rfl: 1    sertraline (ZOLOFT) 100 MG tablet, Take 1 tablet (100 mg total) by mouth every evening., Disp: 90 tablet, Rfl: 4    " "tamsulosin (FLOMAX) 0.4 mg Cap, Take 1 capsule (0.4 mg total) by mouth once daily., Disp: 90 capsule, Rfl: 1    Review of Systems   Constitutional:  Negative for activity change, appetite change, chills, diaphoresis, fatigue, fever and unexpected weight change.   HENT: Negative.  Negative for congestion, hearing loss, postnasal drip, rhinorrhea, sore throat, trouble swallowing and voice change.    Eyes: Negative.  Negative for visual disturbance.   Respiratory: Negative.  Negative for cough, choking, chest tightness and shortness of breath.    Cardiovascular:  Negative for chest pain, palpitations and leg swelling.   Gastrointestinal:  Negative for abdominal distention, abdominal pain, blood in stool, constipation, diarrhea, nausea and vomiting.   Endocrine: Negative for cold intolerance, heat intolerance, polydipsia and polyuria.   Genitourinary: Negative.  Negative for difficulty urinating and frequency.   Musculoskeletal:  Positive for arthralgias, back pain, myalgias, neck pain and neck stiffness. Negative for gait problem and joint swelling.   Skin:  Positive for wound. Negative for color change, pallor and rash.   Neurological:  Negative for dizziness, tremors, weakness, light-headedness, numbness and headaches.   Hematological:  Negative for adenopathy.   Psychiatric/Behavioral:  Negative for behavioral problems, confusion, self-injury, sleep disturbance and suicidal ideas. The patient is not nervous/anxious.    Objective:   /74 (BP Location: Left arm, Patient Position: Sitting, BP Method: Large (Manual))   Pulse 83   Temp 97.1 °F (36.2 °C) (Tympanic)   Ht 5' 5" (1.651 m)   Wt 90 kg (198 lb 6.6 oz)   SpO2 (!) 92%   BMI 33.02 kg/m²     Physical Exam  Vitals and nursing note reviewed.   Constitutional:       General: He is not in acute distress.     Appearance: Normal appearance. He is well-developed. He is not ill-appearing, toxic-appearing or diaphoretic.   HENT:      Head: Normocephalic and " atraumatic.   Cardiovascular:      Rate and Rhythm: Normal rate and regular rhythm.      Heart sounds: Normal heart sounds. No murmur heard.    No friction rub. No gallop.   Pulmonary:      Effort: Pulmonary effort is normal. No respiratory distress.      Breath sounds: Normal breath sounds. No wheezing or rales.   Musculoskeletal:      Right shoulder: Tenderness present. No swelling, deformity, effusion or laceration. Decreased range of motion. Decreased strength.      Left shoulder: Tenderness present. No swelling, deformity, effusion or laceration. Decreased range of motion. Decreased strength.   Skin:     General: Skin is warm.      Findings: No rash.          Neurological:      Mental Status: He is alert and oriented to person, place, and time.   Psychiatric:         Mood and Affect: Mood normal.         Behavior: Behavior normal.         Thought Content: Thought content normal.         Judgment: Judgment normal.     Lab Results   Component Value Date    CREATININE 0.9 03/31/2023    BUN 17 03/31/2023     03/31/2023    K 3.4 (L) 03/31/2023     03/31/2023    CO2 24 03/31/2023     30+ minutes of total time spent on the encounter, which includes face to face time and non-face to face time preparing to see the patient (eg, review of tests), Obtaining and/or reviewing separately obtained history, documenting clinical information in the electronic or other health record, independently interpreting results (not separately reported) and communicating results to the patient/family/caregiver, or Care coordination (not separately reported).    Assessment:     1. Infected sebaceous cyst    2. Shortness of breath    3. Gastroesophageal reflux disease without esophagitis    4. MDD (major depressive disorder), recurrent episode, moderate    5. DOMINIK (generalized anxiety disorder)    6. Uncontrolled type 2 diabetes mellitus with hyperglycemia    7. Chronic pain of both shoulders    8. Shoulder weakness    9. Chronic  combined systolic and diastolic heart failure    10. At risk for medication noncompliance    11. Gait instability      Plan:   Infected sebaceous cyst  -     doxycycline (VIBRAMYCIN) 100 MG Cap; Take 1 capsule (100 mg total) by mouth every 12 (twelve) hours. for 10 days  Dispense: 20 capsule; Refill: 0  -warm compresses.  -if no improvement, refer to gen surg for removal    Shortness of breath  -     Ambulatory referral/consult to Cardiology  -     Ambulatory referral/consult to Home Health; Future; Expected date: 07/14/2023    Gastroesophageal reflux disease without esophagitis  -     pantoprazole (PROTONIX) 40 MG tablet; Take 1 tablet (40 mg total) by mouth once daily.  Dispense: 90 tablet; Refill: 1    MDD (major depressive disorder), recurrent episode, moderate  -     sertraline (ZOLOFT) 100 MG tablet; Take 1 tablet (100 mg total) by mouth every evening.  Dispense: 90 tablet; Refill: 4    DOMINIK (generalized anxiety disorder)  -     sertraline (ZOLOFT) 100 MG tablet; Take 1 tablet (100 mg total) by mouth every evening.  Dispense: 90 tablet; Refill: 4    Uncontrolled type 2 diabetes mellitus with hyperglycemia  -     blood-glucose meter Misc; Use as instructed to test blood glucose meter daily.  Dispense: 1 each; Refill: 1  -     lancets (TRUEPLUS LANCETS) 33 gauge Misc; Use to check blood sugar twice a day  Dispense: 100 each; Refill: 6  -     blood sugar diagnostic Strp; 1 each by Misc.(Non-Drug; Combo Route) route 4 (four) times daily.  Dispense: 300 each; Refill: 3    Chronic pain of both shoulders  -     Ambulatory referral/consult to Physical/Occupational Therapy; Future; Expected date: 07/20/2023    Shoulder weakness  -     Ambulatory referral/consult to Physical/Occupational Therapy; Future; Expected date: 07/20/2023    Chronic combined systolic and diastolic heart failure  -     Ambulatory referral/consult to Home Health; Future; Expected date: 07/14/2023    At risk for medication noncompliance  -      Ambulatory referral/consult to Home Health; Future; Expected date: 07/14/2023    Gait instability    -home health with pt/ot to help with shoulder pain/weakness    Follow up if symptoms worsen or fail to improve.

## 2023-07-14 ENCOUNTER — OFFICE VISIT (OUTPATIENT)
Dept: DIABETES | Facility: CLINIC | Age: 68
End: 2023-07-14
Payer: MEDICARE

## 2023-07-14 ENCOUNTER — TELEPHONE (OUTPATIENT)
Dept: DIABETES | Facility: CLINIC | Age: 68
End: 2023-07-14

## 2023-07-14 DIAGNOSIS — E11.3312 MODERATE NONPROLIFERATIVE DIABETIC RETINOPATHY OF LEFT EYE WITH MACULAR EDEMA ASSOCIATED WITH TYPE 2 DIABETES MELLITUS: ICD-10-CM

## 2023-07-14 DIAGNOSIS — I25.118 CORONARY ARTERY DISEASE OF NATIVE ARTERY OF NATIVE HEART WITH STABLE ANGINA PECTORIS: ICD-10-CM

## 2023-07-14 DIAGNOSIS — E11.42 DIABETIC POLYNEUROPATHY ASSOCIATED WITH TYPE 2 DIABETES MELLITUS: ICD-10-CM

## 2023-07-14 DIAGNOSIS — E11.65 UNCONTROLLED TYPE 2 DIABETES MELLITUS WITH HYPERGLYCEMIA: Primary | ICD-10-CM

## 2023-07-14 DIAGNOSIS — E11.69 HYPERLIPIDEMIA ASSOCIATED WITH TYPE 2 DIABETES MELLITUS: ICD-10-CM

## 2023-07-14 DIAGNOSIS — E66.9 OBESITY (BMI 30.0-34.9): ICD-10-CM

## 2023-07-14 DIAGNOSIS — E11.59 HYPERTENSION ASSOCIATED WITH DIABETES: ICD-10-CM

## 2023-07-14 DIAGNOSIS — I15.2 HYPERTENSION ASSOCIATED WITH DIABETES: ICD-10-CM

## 2023-07-14 DIAGNOSIS — I50.32 CHRONIC DIASTOLIC HEART FAILURE: ICD-10-CM

## 2023-07-14 DIAGNOSIS — E78.5 HYPERLIPIDEMIA ASSOCIATED WITH TYPE 2 DIABETES MELLITUS: ICD-10-CM

## 2023-07-14 DIAGNOSIS — E21.5 DISORDER OF PARATHYROID GLAND: ICD-10-CM

## 2023-07-14 PROCEDURE — 3044F HG A1C LEVEL LT 7.0%: CPT | Mod: CPTII,95,, | Performed by: PHYSICIAN ASSISTANT

## 2023-07-14 PROCEDURE — 4010F PR ACE/ARB THEARPY RXD/TAKEN: ICD-10-PCS | Mod: CPTII,95,, | Performed by: PHYSICIAN ASSISTANT

## 2023-07-14 PROCEDURE — 4010F ACE/ARB THERAPY RXD/TAKEN: CPT | Mod: CPTII,95,, | Performed by: PHYSICIAN ASSISTANT

## 2023-07-14 PROCEDURE — 99442 PR PHYSICIAN TELEPHONE EVALUATION 11-20 MIN: CPT | Mod: 95,,, | Performed by: PHYSICIAN ASSISTANT

## 2023-07-14 PROCEDURE — 3044F PR MOST RECENT HEMOGLOBIN A1C LEVEL <7.0%: ICD-10-PCS | Mod: CPTII,95,, | Performed by: PHYSICIAN ASSISTANT

## 2023-07-14 PROCEDURE — 99442 PR PHYSICIAN TELEPHONE EVALUATION 11-20 MIN: ICD-10-PCS | Mod: 95,,, | Performed by: PHYSICIAN ASSISTANT

## 2023-07-14 NOTE — PATIENT INSTRUCTIONS
CURRENT DM MEDICATIONS:   Novolog correction dosing every 3 hours   Jardiance 25 mg daily  Ozempic 2 mg weekly    Novolog Correction Dosing every 3 hours as needed OUTSIDE OF EATING  If  - 250, may take 2 units of Novolog  If  - 300, may take 3 units of Novolog  If  - 350, may take 4 units of Novolog  If  - 400, may take 5 units of Novolog  If +, may take 6 units of Novolog

## 2023-07-14 NOTE — Clinical Note
Dana, this pt used to use dexcom and has gotten new phone so needs to get retraining. Matilda set him up with you next week - has to set up transportation. His sensors are all good despite having them for a long time. Did not have transmitter today - I am getting matilda to put one to the side in case he comes in without one on thurs.   Matilda, we also need to send reorder for G6 to [facility]

## 2023-07-14 NOTE — TELEPHONE ENCOUNTER
Mehama order to [facility] for Dexcom G6 sensors and transmitter

## 2023-07-14 NOTE — PROGRESS NOTES
PCP: Cm Polanco MD    Subjective:     Chief Complaint: Diabetes - Established Patient    Established Patient - Audio Only Telehealth Visit     The patient location is: Home  The chief complaint leading to consultation is: Diabetes follow up  Visit type: Virtual visit with audio only (telephone)  Total Time Spent with Patient: 18 minutes     The reason for the audio only service rather than synchronous audio and video virtual visit was related to technical difficulties or patient preference/necessity.     Each patient to whom I provide medical services by telemedicine is:  (1) informed of the relationship between the physician and patient and the respective role of any other health care provider with respect to management of the patient; and (2) notified that they may decline to receive medical services by telemedicine and may withdraw from such care at any time. Patient verbally consented to receive this service via voice-only telephone call.    This service was not originating from a related E/M service provided within the previous 7 days nor will  to an E/M service or procedure within the next 24 hours or my soonest available appointment.  Prevailing standard of care was able to be met in this audio-only visit.       HISTORY OF PRESENT ILLNESS: 68 y.o.   male presenting for diabetes management visit.   The patient's last visit with me was on 1/12/2023.  Patient has issues with transportation.   Patient has had Type II diabetes since 2000.  Pertinent to decision making is the following comorbidities: HTN, HLD, CAD, CHF, CKD III, Obesity by BMI, ED and Parathryoid disorder and Hypogonadism   Patient has the following Diabetes complications: with diabetic chronic kidney disease, with diabetic polyneuropathy and with diabetic retinopathy; mild non-proliferative; without macular edema  He has attended diabetes education in the past.     Patient's most recent A1c of 6.2% was completed 1  weeks ago.   Patient states since His last A1c His blood glucose levels have been within range throughout the day .   Patient monitors blood glucose 4 times per day and Continuously with personal CGM Dexcom. Has new phone - needs to set up Dexcom retraining.    Patient blood glucose monitoring device will not be uploaded into Media Section today.  Patient endorses the following diabetes related symptoms:  None .   Patient is due today for the following diabetes-related health maintenance standards: COVID-19 Vaccine .   He voices recent hospital admissions or emergency room visits over last few months for several different health issues.   He voices having hypoglycemia in the am when skipping meal.   Patient's concerns today include glycemic control.   Patient medication regimen is as below.     CURRENT DM MEDICATIONS:   Novolog correction dosing every 3 hours   Jardiance 25 mg daily  Ozempic 2 mg weekly    Novolog Correction Dosing every 3 hours as needed OUTSIDE OF EATING  If  - 250, may take 2 units of Novolog  If  - 300, may take 3 units of Novolog  If  - 350, may take 4 units of Novolog  If  - 400, may take 5 units of Novolog  If +, may take 6 units of Novolog      Patient has failed the following Diabetes medications:   Bydureon  Metformin - GI  Glimepiride      Labs Reviewed.       Lab Results   Component Value Date    CPEPTIDE 4.37 07/20/2017     Lab Results   Component Value Date    GLUTAMICACID 0.01 07/20/2017          //   , There is no height or weight on file to calculate BMI.  His blood sugar in clinic today is:        Review of Systems   Constitutional:  Negative for activity change, appetite change, chills and fever.   HENT:  Negative for dental problem, mouth sores, nosebleeds, sore throat and trouble swallowing.    Eyes:  Negative for pain and discharge.   Respiratory:  Negative for shortness of breath, wheezing and stridor.    Cardiovascular:  Negative for chest pain,  palpitations and leg swelling.   Gastrointestinal:  Negative for abdominal pain, diarrhea, nausea and vomiting.   Endocrine: Negative for polydipsia, polyphagia and polyuria.   Genitourinary:  Negative for dysuria, frequency and urgency.   Musculoskeletal:  Negative for joint swelling and myalgias.   Skin:  Negative for rash and wound.   Neurological:  Negative for dizziness, syncope, weakness and headaches.   Psychiatric/Behavioral:  Negative for behavioral problems and dysphoric mood.        Diabetes Management Status  Statin: Taking  ACE/ARB: Taking    Screening or Prevention Patient's value Goal Complete/Controlled?   HgA1C Testing and Control   Lab Results   Component Value Date    HGBA1C 6.2 05/23/2023      Annually/Less than 8% Yes   Lipid profile : 07/08/2023 Annually Yes   LDL control Lab Results   Component Value Date    LDLCALC 62 07/08/2023    Annually/Less than 100 mg/dl  Yes   Nephropathy screening Lab Results   Component Value Date    MICALBCREAT 1310.7 (H) 09/28/2022     Lab Results   Component Value Date    PROTEINUA 2+ (A) 03/31/2023    Annually No   Blood pressure BP Readings from Last 1 Encounters:   07/13/23 136/74    Less than 140/90 Yes   Dilated retinal exam : 01/18/2023 Annually Yes    Foot exam   : 04/11/2023 Annually Yes     Social History     Socioeconomic History    Marital status: Legally     Number of children: 5   Occupational History    Occupation: disabled    Occupation: PT at [employer]    Tobacco Use    Smoking status: Never    Smokeless tobacco: Never   Substance and Sexual Activity    Alcohol use: Yes     Comment: rare, maybe holidays    Drug use: Not Currently     Types: Cocaine    Sexual activity: Not Currently     Partners: Female   Social History Narrative    Utilizing [employer] transportation which has been unreliable.      Social Determinants of Health     Financial Resource Strain: Low Risk     Difficulty of Paying Living Expenses: Not very hard    Food Insecurity: No Food Insecurity    Worried About Running Out of Food in the Last Year: Never true    Ran Out of Food in the Last Year: Never true   Transportation Needs: Unmet Transportation Needs    Lack of Transportation (Medical): Yes    Lack of Transportation (Non-Medical): Yes   Physical Activity: Inactive    Days of Exercise per Week: 0 days    Minutes of Exercise per Session: 0 min   Stress: Stress Concern Present    Feeling of Stress : Rather much   Social Connections: Moderately Isolated    Frequency of Communication with Friends and Family: Once a week    Frequency of Social Gatherings with Friends and Family: Once a week    Attends Restoration Services: More than 4 times per year    Active Member of Clubs or Organizations: No    Attends Club or Organization Meetings: More than 4 times per year    Marital Status:    Housing Stability: Low Risk     Unable to Pay for Housing in the Last Year: No    Number of Places Lived in the Last Year: 1    Unstable Housing in the Last Year: No     Past Medical History:   Diagnosis Date    Arthritis     Asthma     Atrial fibrillation     Atrial fibrillation with RVR 08/07/2019    Carotid artery stenosis     CHF (congestive heart failure)     Chronic obstructive pulmonary disease 08/05/2020    Depression     Dermatomyositis     Diabetes mellitus, type 2 Diagnosed in 2000    Elevated PSA     Follow up 07/30/2020    Hypertension     Myocardial infarction     2017    Sleep apnea        Objective:        Physical Exam  Neurological:      Mental Status: He is alert and oriented to person, place, and time. Mental status is at baseline.   Psychiatric:         Mood and Affect: Mood normal.         Behavior: Behavior normal.         Thought Content: Thought content normal.         Judgment: Judgment normal.         Assessment / Plan:     Uncontrolled type 2 diabetes mellitus with hyperglycemia  -     Ambulatory referral/consult to Diabetes Education; Future; Expected  date: 07/21/2023  -     Hemoglobin A1C; Standing    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 associated with type 2 diabetes mellitus    Diabetic polyneuropathy associated with type 2 diabetes mellitus    Chronic diastolic heart failure    Coronary artery disease of native artery of native heart with stable angina pectoris    Hypertension associated with diabetes    Hyperlipidemia associated with type 2 diabetes mellitus    Disorder of parathyroid gland    Obesity (BMI 30.0-34.9)        Additional Plan Details:    - POCT Glucose  - Encouraged continuation of lifestyle changes including regular exercise and limiting carbohydrates to 30-45 grams per meal threes times daily and 15 grams per snack with a limit of two daily.   - Encouraged continued monitoring of blood glucose with maintenance of 4 times daily and Continuously with personal CGM Dexcom.    - Current DM Medication Regimen: Continue Novolog correction dosing every 3 hours as below.  Continue Ozempic 2 mg weekly. Continue Jardiance 25 mg daily.    - Training with CDE next week for Dexcom retraining  - Health Maintenance standards addressed today: COVID - 19 Vaccine - patient will schedule outside of [facility]   - Nursing Visit: Patient is below goal range for nursing visit for age group and will not need nursing visit at this time .   - Follow up in 3 months with A1c prior.    Novolog Correction Dosing every 3 hours as needed OUTSIDE OF EATING  If  - 250, may take 2 units of Novolog  If  - 300, may take 4 units of Novolog  If  - 350, may take 6 units of Novolog  If  - 400, may take 8 units of Novolog  If +, may take 10 units of Novolog - MAX Blakeney McKnight, PA-C [facility]

## 2023-07-17 ENCOUNTER — TELEPHONE (OUTPATIENT)
Dept: INTERNAL MEDICINE | Facility: CLINIC | Age: 68
End: 2023-07-17
Payer: MEDICARE

## 2023-07-17 NOTE — TELEPHONE ENCOUNTER
----- Message from Génesis Escobedo sent at 7/17/2023  3:09 PM CDT -----  Contact: pt  Type: Needs Medical Advice  Who Called:  pt     Pharmacy name and phone #:        [id number] - [place] - [address]  [address] [address]  Phone: [phone] Fax: [phone]      Best Call Back Number: [phone]    Additional Information: pt states he did not reicive his Medication #1 furosemide (LASIX) 40 MG tablet  Medication #2 pantoprazole (PROTONIX) 40 MG tablet [facility] would like you all to send it too [facility] today. Please advise.

## 2023-07-17 NOTE — TELEPHONE ENCOUNTER
----- Message from Abbey Jackson sent at 7/17/2023 11:04 AM CDT -----  Regarding: refill  Please refill the medication(s) listed below. Please call the patient when the prescription(s) is ready for  at this phone number             Medication #1 furosemide (LASIX) 40 MG tablet        Medication #2 pantoprazole (PROTONIX) 40 MG tablet          Preferred Pharmacy:     [id number] - [place] - [address] AT [facility]  [address] [address]  Phone: [phone] Fax: [phone]

## 2023-07-17 NOTE — TELEPHONE ENCOUNTER
Tried calling the patient no answer and voice mail not setup.    Protonix 40 mg disp 90 tabs rills 1  Refilled on 7/13/23      Lasix 40mg Disp 120 tabs refill 10   Refilled on 7/7/23 by Dr Zhao.

## 2023-07-19 PROCEDURE — G0180 PR HOME HEALTH MD CERTIFICATION: ICD-10-PCS | Mod: ,,, | Performed by: PHYSICIAN ASSISTANT

## 2023-07-19 PROCEDURE — G0180 MD CERTIFICATION HHA PATIENT: HCPCS | Mod: ,,, | Performed by: PHYSICIAN ASSISTANT

## 2023-07-20 ENCOUNTER — CLINICAL SUPPORT (OUTPATIENT)
Dept: DIABETES | Facility: CLINIC | Age: 68
End: 2023-07-20
Payer: MEDICARE

## 2023-07-20 DIAGNOSIS — E11.65 UNCONTROLLED TYPE 2 DIABETES MELLITUS WITH HYPERGLYCEMIA: ICD-10-CM

## 2023-07-20 PROCEDURE — 99999 PR PBB SHADOW E&M-EST. PATIENT-LVL IV: CPT | Mod: PBBFAC,,,

## 2023-07-20 PROCEDURE — 95249 CONT GLUC MNTR PT PROV EQP: CPT | Mod: S$GLB,,,

## 2023-07-20 PROCEDURE — G0108 PR DIAB MANAGE TRN  PER INDIV: ICD-10-PCS | Mod: S$GLB,,,

## 2023-07-20 PROCEDURE — 95249 PR GLUCOSE MONITORING, 72 HRS, SUB-Q SENSOR, PATIENT PROVIDED: ICD-10-PCS | Mod: S$GLB,,,

## 2023-07-20 PROCEDURE — 99999 PR PBB SHADOW E&M-EST. PATIENT-LVL IV: ICD-10-PCS | Mod: PBBFAC,,,

## 2023-07-20 PROCEDURE — G0108 DIAB MANAGE TRN  PER INDIV: HCPCS | Mod: S$GLB,,,

## 2023-07-20 NOTE — Clinical Note
Called Mr. Green to follow-up after placing Dexcom last Thursday. Daily episodes of hypoglycemia, but he states he doesn't remember anything, so he's not sure what could be causing the lows. Clarity report in media for review, if you have any recommendations! Thanks

## 2023-07-20 NOTE — PATIENT INSTRUCTIONS
[facility]:  [email or web address]  [email or web address]    Dexcom Account:  [email or web address]  [email or web address]

## 2023-07-20 NOTE — PROGRESS NOTES
Diabetes Care Specialist Progress Note  Author: Dana Humphrey RN  Date: 7/20/2023    Program Intake  Reason for Diabetes Program Visit:: Intervention  Type of Intervention:: Individual  Individual: Device Training  Device Training: Personal CGM  Current diabetes risk level:: high  Permission to speak with others about care:: no    Lab Results   Component Value Date    HGBA1C 6.2 05/23/2023     Addendum 7/25/2023: Called Mr. Green to review his Dexcom G6 Clarity report. Hypoglycemia noted daily; Mr. Green states he is symptomatic. I asked what he could remember about Sunday, July 23rd that could have led to the hypoglycemia, but he states he does not remember anything. He states he is compliant with his medications. He thinks the lows are related to not eating and not with the insulin he gives. Will forward to JEWELS Harris for review. Clarity report is in the media.        Diabetes Self-Management Skills Assessment    Home Blood Glucose Monitoring  Patient states that blood sugar is checked at home daily.: yes  Monitoring Method:: home glucometer  How often do you check your blood sugar?: Once a day  When do you check your blood sugar?: Before breakfast  When you check what is your typical blood sugar range? : Today's   Blood glucose logs:: no  Blood glucose logs reviewed today?: no  Home Blood Glucose Monitoring Skills Assessment Completed: : Yes  Assessment indicates:: Knowledge deficit, Instruction Needed  Area of need?: Yes    Assessment Summary and Plan    Based on today's diabetes care assessment, the following areas of need were identified:      Social 2/3/2021   Support Yes   Access to Mass Media/Tech No   Cognitive/Behavioral Health No   Culture/Caodaism No   Communication No   Health Literacy Yes        Clinical 2/3/2021   Nutritional Status No        Diabetes Self-Management Skills 7/20/2023   Home Blood Glucose Monitoring Yes-See care plan for Dexcom G6 training.             Today's  "interventions were provided through individual discussion, instruction, and written materials were provided.      Patient verbalized understanding of instruction and written materials.  Pt was able to return back demonstration of instructions today. Patient understood key points, needs reinforcement and further instruction.     Diabetes Self-Management Care Plan:    Today's Diabetes Self-Management Care Plan was developed with Crow's input. Crow has agreed to work toward the following goal(s) to improve his/her overall diabetes control.      Care Plan: Diabetes Management   Updates made since 6/20/2023 12:00 AM        Problem: Blood Glucose Self-Monitoring         Goal: Patient agrees to monitor blood glucose using personal Dexcom G6.    Note:    Assisted Mr. Green with setting up an email account, so he can use the Dexcom brittnee on his phone. iCloud and Dexcom info provided to him on AVS; will also keep copy in notes.     Method: demonstration and instruction  Level of Comprehension: demonstrates understanding/competency - verbalizes/demonstrates    DEXCOM G6 CGM TRAINING  Mr. Green has used Dexcom G6 before with use of . He needed assistance with setting it up on his phone. Re-reviewed training details.   Dexcom G6 & clarity mobile apps downloaded to phone. Education was provided using "Quick Start Guide" per Dexcom protocol. Clarity clinic data share set-up.      Overview:  5min glucose reading updates, trending arrows, BG graph screens, battery life indicator, Blue Tooth Symbol.  Menus: trend Graph, start sensor, enter BG, events, Alerts, Settings, Shutdown, Stop Sensor   Settings:                          * Urgent Low: 55 mg/dL                          * Urgent Low Soon: On                          * Low alert: 70 mg/dL                          * High alert: 200 mg/dL                          * Rise rate: Off                          * Fall Rate: On                          * Signal " Loss: On                          * No Reading: On      Reviewed additional setting options.   Pt paired sensor and transmitter with .    Reviewed where to find sensor insertion time and sensor expiration date.   Reviewed appropriate calibration techniques.  Reviewed sensor site selection. Pt selected and prepped site using aseptic technique, inserted sensor and transmitter and started session.   Practiced sensor pod/transmitter removal from site, and removal of transmitter from sensor pod.  Patient able to demonstrate without difficulty.  Encouraged to review manual prior to starting another sensor.   Reviewed problem solving aspects of sensor transmission/ variables that can disrupt RF transmission.  range 20 feet, but the first 3 hrs keep within 3 feet of transmitter.  Pt instructed on lag time of interstitial fluid from CBG and was advised to tx hypoglycemia and dose insulin based on SMBG values.  Dexcom technical support contact number given and examples of when to contact them discussed.           Task: Reviewed advantages of having a personal CGM monitor. Completed 7/20/2023        Task: Discussed misconceptions of CGM monitors. Completed 7/20/2023        Task: Instructed on how often to change sensor. Completed 7/20/2023   Note:    Change sensor every 10 days.       Task: Discussed proper rotation of sensor sites. Completed 7/20/2023        Task: Discussed need for calibration if necessary. Completed 7/20/2023        Task: Directed to use directional arrows to make more informed decisions on managing glucose. Completed 7/20/2023        Task: Encouraged patient to bring their device to clinic visits. Completed 7/20/2023        Task: Discussed guidelines for preventing, detecting and treating hypoglycemia and hyperglycemia and reviewed the importance of meal and medication timing with diabetes mediations for prevention of hypoglycemia and maximum drug benefit. Completed 7/20/2023           Follow Up Plan     Follow up in 1 week (on 7/27/2023) for Dexcom follow-up phone call.    Today's care plan and follow up schedule was discussed with patient.  Crow verbalized understanding of the care plan, goals, and agrees to follow up plan.        The patient was encouraged to communicate with his/her health care provider/physician and care team regarding his/her condition(s) and treatment.  I provided the patient with my contact information today and encouraged to contact me via phone or [facility] as needed.     Length of Visit   Total Time: 60 Minutes

## 2023-07-24 ENCOUNTER — TELEPHONE (OUTPATIENT)
Dept: INTERNAL MEDICINE | Facility: CLINIC | Age: 68
End: 2023-07-24
Payer: MEDICARE

## 2023-07-25 ENCOUNTER — TELEPHONE (OUTPATIENT)
Dept: INTERNAL MEDICINE | Facility: CLINIC | Age: 68
End: 2023-07-25
Payer: MEDICARE

## 2023-07-25 ENCOUNTER — TELEPHONE (OUTPATIENT)
Dept: DIABETES | Facility: CLINIC | Age: 68
End: 2023-07-25
Payer: MEDICARE

## 2023-07-25 NOTE — TELEPHONE ENCOUNTER
----- Message from Yojana Riggs sent at 7/25/2023 11:34 AM CDT -----  Pt request a call back states the migraines are causing other issues and he is suffering,Please call back at [phone].thanks

## 2023-07-25 NOTE — TELEPHONE ENCOUNTER
----- Message from Debi Coello sent at 7/25/2023  4:42 PM CDT -----  Contact: Crow  .Type:  Patient Returning Call    Who Called:Crow   Who Left Message for Patient:Serge   Does the patient know what this is regarding?:current health status (currently in [facility])   Would the patient rather a call back or a response via [facility]? call  Best Call Back Number:.[phone]  Additional Information:   Thanks  LR

## 2023-07-25 NOTE — TELEPHONE ENCOUNTER
Called Mr. Green to remind him to eat at least 3x/day and to give insulin before meals and not after. He states he will fix a bowl of oatmeal now and start giving insulin before meals. Successful call.

## 2023-07-25 NOTE — TELEPHONE ENCOUNTER
Tried returning  the patient call. I was informed that the patient  is at the hospital. The patient can not talk at the moment.

## 2023-07-26 ENCOUNTER — TELEPHONE (OUTPATIENT)
Dept: INTERNAL MEDICINE | Facility: CLINIC | Age: 68
End: 2023-07-26
Payer: MEDICARE

## 2023-07-26 NOTE — TELEPHONE ENCOUNTER
----- Message from Nhung Bashir sent at 7/26/2023  1:27 PM CDT -----  Contact: too Maria  .Type:  Patient Returning Call    Who Called: too Maria   Who Left Message for Patient:Teresa  Does the patient know what this is regarding?: No  Would the patient rather a call back or a response via [facility]?  Call  Best Call Back Number: .[phone]   Additional Information:

## 2023-07-26 NOTE — TELEPHONE ENCOUNTER
I tried returning the patient call to [phone] I was informed this is an incorrect number for the patient.The number listed is for a new channel.     Tried [phone] emergency contact Gretchen informed I have the wrong number.    Called [phone] Erica, informed the persons that answer the phone I was calling from Dr Polanco office trying to return Crow's call. I was informed that Crow was taken to the hospital on yesterday by ambulance. The gentleman that answer the phone on Ms Maldonado stated that he will go to the patient father's house to inform them that Dr Polanco office was trying to reach the patient.    [de-identified] : 10monF with h/o reflux and long h/o dysphonia.\par Mom stated that she is still dysphonic when she is crying. She is not babbling \par She had bilateral ear infection about three weeks ago. Required abx \par Occasionally she touches her ears but mom denied tugging or pulling. \par Was on famotidine for 4 weeks, then stopped.

## 2023-07-26 NOTE — TELEPHONE ENCOUNTER
----- Message from Margaret Ch sent at 7/26/2023 11:58 AM CDT -----  Contact: Crow Wright wanted to notify the dr that he is in the hospital at [facility] and hes having headaches, backaches low blood pressure and eye pain/vision issues. He has called for the dr a number of times, but they keep missing each other. Please call him back at [phone].    Thanks  TS

## 2023-07-27 ENCOUNTER — TELEPHONE (OUTPATIENT)
Dept: PODIATRY | Facility: CLINIC | Age: 68
End: 2023-07-27
Payer: MEDICARE

## 2023-07-27 ENCOUNTER — TELEPHONE (OUTPATIENT)
Dept: DIABETES | Facility: CLINIC | Age: 68
End: 2023-07-27
Payer: MEDICARE

## 2023-07-27 NOTE — TELEPHONE ENCOUNTER
-patient called there was no answer unable to leave msg---- Message from Ronit Olsen sent at 7/27/2023  1:43 PM CDT -----  Contact: SHAGGY JASON [[id number]]@ [phone]  Patient is returning a phone call.  Who left a message for the patient: Tara  Does patient know what this is regarding:  missed call  Would you like a call back, or a response through your [facility] portal?:   call back   Comments:  .

## 2023-07-27 NOTE — TELEPHONE ENCOUNTER
I tried to call the patient several times phone just goes straight to .         ----- Message from Megan Dean sent at 7/27/2023 10:39 AM CDT -----  Contact: Crow  Patient is calling to speak with the nurse regarding appt. Reports in the hospital and may not be able to make the appt. Please give patient a call back at .[phone] .

## 2023-07-31 ENCOUNTER — TELEPHONE (OUTPATIENT)
Dept: INTERNAL MEDICINE | Facility: CLINIC | Age: 68
End: 2023-07-31
Payer: MEDICARE

## 2023-07-31 NOTE — TELEPHONE ENCOUNTER
Tried calling pt no answer unable to leave VM.      ----- Message from Peggy Arteaga sent at 7/31/2023 10:52 AM CDT -----  Regarding: Appointment  Contact: Crow  .Type:  Needs Medical Advice    Who Called: Crow  Symptoms (please be specific):    How long has patient had these symptoms:    Pharmacy name and phone #:    Would the patient rather a call back or a response via [facility]? Call   Best Call Back Number: [phone] (home)    Additional Information:  Crow is requesting a printout of his appointments be sent to him via mail.

## 2023-08-02 ENCOUNTER — TELEPHONE (OUTPATIENT)
Dept: INTERNAL MEDICINE | Facility: CLINIC | Age: 68
End: 2023-08-02
Payer: MEDICARE

## 2023-08-02 NOTE — TELEPHONE ENCOUNTER
----- Message from Kimmie Sheikh sent at 8/2/2023  9:33 AM CDT -----  Contact: Patient, [phone]  Requesting an RX refill or new RX.  Is this a refill or new RX: Refill  RX name and strength (copy/paste from chart):  atorvastatin (LIPITOR) 40 MG tablet  Is this a 30 day or 90 day RX: 90  Pharmacy name and phone # (copy/paste from chart):    [id number] - [place] - [address]  [address] [address]  Phone: [phone] Fax: [phone]  The doctors have asked that we provide their patients with the following 2 reminders -- prescription refills can take up to 72 hours, and a friendly reminder that in the future you can use your [facility] account to request refills: Yes

## 2023-08-07 ENCOUNTER — EXTERNAL HOME HEALTH (OUTPATIENT)
Dept: HOME HEALTH SERVICES | Facility: HOSPITAL | Age: 68
End: 2023-08-07
Payer: MEDICARE

## 2023-08-09 ENCOUNTER — PATIENT MESSAGE (OUTPATIENT)
Dept: ALLERGY | Facility: CLINIC | Age: 68
End: 2023-08-09
Payer: MEDICARE

## 2023-08-09 ENCOUNTER — OFFICE VISIT (OUTPATIENT)
Dept: PULMONOLOGY | Facility: CLINIC | Age: 68
End: 2023-08-09
Payer: MEDICARE

## 2023-08-09 ENCOUNTER — DOCUMENT SCAN (OUTPATIENT)
Dept: HOME HEALTH SERVICES | Facility: HOSPITAL | Age: 68
End: 2023-08-09
Payer: MEDICARE

## 2023-08-09 ENCOUNTER — TELEPHONE (OUTPATIENT)
Dept: RHEUMATOLOGY | Facility: CLINIC | Age: 68
End: 2023-08-09
Payer: MEDICARE

## 2023-08-09 VITALS
OXYGEN SATURATION: 95 % | RESPIRATION RATE: 18 BRPM | WEIGHT: 189.13 LBS | HEART RATE: 77 BPM | DIASTOLIC BLOOD PRESSURE: 76 MMHG | BODY MASS INDEX: 31.51 KG/M2 | HEIGHT: 65 IN | SYSTOLIC BLOOD PRESSURE: 112 MMHG

## 2023-08-09 DIAGNOSIS — J45.998 UNCONTROLLED PERSISTENT ASTHMA: ICD-10-CM

## 2023-08-09 DIAGNOSIS — I50.22 CHRONIC SYSTOLIC CONGESTIVE HEART FAILURE: ICD-10-CM

## 2023-08-09 DIAGNOSIS — I50.1 CARDIAC ASTHMA: ICD-10-CM

## 2023-08-09 DIAGNOSIS — D84.9 IMMUNOCOMPROMISED: ICD-10-CM

## 2023-08-09 DIAGNOSIS — R76.8 ELEVATED IGE LEVEL: ICD-10-CM

## 2023-08-09 DIAGNOSIS — M33.13 DERMATOMYOSITIS: Primary | ICD-10-CM

## 2023-08-09 PROCEDURE — 1159F MED LIST DOCD IN RCRD: CPT | Mod: CPTII,S$GLB,, | Performed by: INTERNAL MEDICINE

## 2023-08-09 PROCEDURE — 3008F PR BODY MASS INDEX (BMI) DOCUMENTED: ICD-10-PCS | Mod: CPTII,S$GLB,, | Performed by: INTERNAL MEDICINE

## 2023-08-09 PROCEDURE — 99999 PR PBB SHADOW E&M-EST. PATIENT-LVL V: ICD-10-PCS | Mod: PBBFAC,,, | Performed by: INTERNAL MEDICINE

## 2023-08-09 PROCEDURE — 1159F PR MEDICATION LIST DOCUMENTED IN MEDICAL RECORD: ICD-10-PCS | Mod: CPTII,S$GLB,, | Performed by: INTERNAL MEDICINE

## 2023-08-09 PROCEDURE — 3074F PR MOST RECENT SYSTOLIC BLOOD PRESSURE < 130 MM HG: ICD-10-PCS | Mod: CPTII,S$GLB,, | Performed by: INTERNAL MEDICINE

## 2023-08-09 PROCEDURE — 3044F PR MOST RECENT HEMOGLOBIN A1C LEVEL <7.0%: ICD-10-PCS | Mod: CPTII,S$GLB,, | Performed by: INTERNAL MEDICINE

## 2023-08-09 PROCEDURE — 99999 PR PBB SHADOW E&M-EST. PATIENT-LVL V: CPT | Mod: PBBFAC,,, | Performed by: INTERNAL MEDICINE

## 2023-08-09 PROCEDURE — 3008F BODY MASS INDEX DOCD: CPT | Mod: CPTII,S$GLB,, | Performed by: INTERNAL MEDICINE

## 2023-08-09 PROCEDURE — 99214 PR OFFICE/OUTPT VISIT, EST, LEVL IV, 30-39 MIN: ICD-10-PCS | Mod: S$GLB,,, | Performed by: INTERNAL MEDICINE

## 2023-08-09 PROCEDURE — 3288F PR FALLS RISK ASSESSMENT DOCUMENTED: ICD-10-PCS | Mod: CPTII,S$GLB,, | Performed by: INTERNAL MEDICINE

## 2023-08-09 PROCEDURE — 3044F HG A1C LEVEL LT 7.0%: CPT | Mod: CPTII,S$GLB,, | Performed by: INTERNAL MEDICINE

## 2023-08-09 PROCEDURE — 99214 OFFICE O/P EST MOD 30 MIN: CPT | Mod: S$GLB,,, | Performed by: INTERNAL MEDICINE

## 2023-08-09 PROCEDURE — 3078F PR MOST RECENT DIASTOLIC BLOOD PRESSURE < 80 MM HG: ICD-10-PCS | Mod: CPTII,S$GLB,, | Performed by: INTERNAL MEDICINE

## 2023-08-09 PROCEDURE — 1160F PR REVIEW ALL MEDS BY PRESCRIBER/CLIN PHARMACIST DOCUMENTED: ICD-10-PCS | Mod: CPTII,S$GLB,, | Performed by: INTERNAL MEDICINE

## 2023-08-09 PROCEDURE — 4010F ACE/ARB THERAPY RXD/TAKEN: CPT | Mod: CPTII,S$GLB,, | Performed by: INTERNAL MEDICINE

## 2023-08-09 PROCEDURE — 1160F RVW MEDS BY RX/DR IN RCRD: CPT | Mod: CPTII,S$GLB,, | Performed by: INTERNAL MEDICINE

## 2023-08-09 PROCEDURE — 3074F SYST BP LT 130 MM HG: CPT | Mod: CPTII,S$GLB,, | Performed by: INTERNAL MEDICINE

## 2023-08-09 PROCEDURE — 1101F PT FALLS ASSESS-DOCD LE1/YR: CPT | Mod: CPTII,S$GLB,, | Performed by: INTERNAL MEDICINE

## 2023-08-09 PROCEDURE — 4010F PR ACE/ARB THEARPY RXD/TAKEN: ICD-10-PCS | Mod: CPTII,S$GLB,, | Performed by: INTERNAL MEDICINE

## 2023-08-09 PROCEDURE — 3288F FALL RISK ASSESSMENT DOCD: CPT | Mod: CPTII,S$GLB,, | Performed by: INTERNAL MEDICINE

## 2023-08-09 PROCEDURE — 1101F PR PT FALLS ASSESS DOC 0-1 FALLS W/OUT INJ PAST YR: ICD-10-PCS | Mod: CPTII,S$GLB,, | Performed by: INTERNAL MEDICINE

## 2023-08-09 PROCEDURE — 3078F DIAST BP <80 MM HG: CPT | Mod: CPTII,S$GLB,, | Performed by: INTERNAL MEDICINE

## 2023-08-09 RX ORDER — TRAMADOL HYDROCHLORIDE 50 MG/1
50 TABLET ORAL
COMMUNITY
Start: 2023-07-28

## 2023-08-09 RX ORDER — PREDNISONE 50 MG/1
50 TABLET ORAL
COMMUNITY
Start: 2023-07-28

## 2023-08-09 RX ORDER — OMEPRAZOLE 20 MG/1
20 CAPSULE, DELAYED RELEASE ORAL EVERY MORNING
COMMUNITY
Start: 2023-07-10

## 2023-08-09 NOTE — PROGRESS NOTES
Pulmonary Outpatient Follow Up Visit     Subjective:        Patient ID: Crow Green is a 68 y.o. male.    Chief Complaint: Asthma, COPD, and Sleep Apnea (/)        HPI        68-year-old male patient presenting for 6 months follow-up.      8/9/2023 [facility] admission for hypotension , JOSE MARIA , chest pain .  Currently using oxygen 2 L on exertion and during sleep.  Has not seen Rheumatology for dermatomyositis since 2020.  Not compliant with CPAP therapy intolerant to CPAP machine.      Did not start Xolair therapy.  Did not follow-up with Allergy and immunology.  On Trelegy 200 mcg 1 puff daily.  Never smoker.  Asthma not well-controlled asthma control test questionnaire 9.    01/25/2023 underwent CPAP titration.  Not using CPAP therapy intolerant and want to return the machine against medical advice.      Asthma not well-controlled asthma control test questionnaire score 11.      Initially evaluated September 2022 as a hospital follow-up after admission July 2022 for MRSA pneumonia and discharged home on oxygen.      Known with severe obstructive sleep apnea, lost his CPAP machine years ago, systolic heart failure.      Does complain of intermittent wheezing coughing SOB on exertion.      Does have a son with severe asthma who is not smoker.          Review of Systems   Constitutional:  Positive for weight gain, fatigue and weakness. Negative for fever and chills.   HENT:  Negative for nosebleeds.    Eyes:  Negative for redness.   Respiratory:  Positive for apnea, snoring, cough, shortness of breath, wheezing, previous hospitalization due to pulmonary problems, asthma nighttime symptoms, dyspnea on extertion, use of rescue inhaler and somnolence. Negative for choking.    Cardiovascular:  Positive for leg swelling. Negative for chest pain.   Genitourinary:  Negative for hematuria.   Endocrine:  Negative for cold intolerance.    Musculoskeletal:  Positive for arthralgias,  back pain and gait problem.   Skin:  Negative for rash.   Gastrointestinal:  Negative for vomiting.   Neurological:  Negative for syncope.   Hematological:  Negative for adenopathy.   Psychiatric/Behavioral:  Positive for sleep disturbance. Negative for confusion.        Outpatient Encounter Medications as of 8/9/2023   Medication Sig Dispense Refill    albuterol (PROVENTIL) 2.5 mg /3 mL (0.083 %) nebulizer solution INHALE THE CONTENTS OF 1 VIAL VIA NEBULIZER EVERY 4 TO 6 HOURS AS NEEDED FOR WHEEZING OR FOR SHORTNESS OF BREATH 1080 mL 3    allopurinoL (ZYLOPRIM) 100 MG tablet Take 1 tablet (100 mg total) by mouth once daily. 90 tablet 1    amLODIPine (NORVASC) 2.5 MG tablet Take 1 tablet (2.5 mg total) by mouth once daily. 30 tablet 11    apixaban (ELIQUIS) 5 mg Tab TAKE 1 TABLET TWICE DAILY 180 tablet 2    aspirin (ECOTRIN) 81 MG EC tablet Take 1 tablet (81 mg total) by mouth once daily.      atorvastatin (LIPITOR) 40 MG tablet Take 1 tablet (40 mg total) by mouth every evening. 90 tablet 3    azaTHIOprine (IMURAN) 50 mg Tab Take 1 tablet (50 mg total) by mouth once daily. 90 tablet 0    blood sugar diagnostic Strp 1 each by Misc.(Non-Drug; Combo Route) route 4 (four) times daily. 300 each 3    blood-glucose meter Misc Use as instructed to test blood glucose meter daily. 1 each 1    brinzolamide-brimonidine (SIMBRINZA) 1-0.2 % DrpS Place 1 drop into both eyes 3 (three) times daily. 8 mL 6    fluticasone-umeclidin-vilanter (TRELEGY ELLIPTA) 200-62.5-25 mcg inhaler Inhale 1 puff into the lungs once daily. 360 each 5    furosemide (LASIX) 40 MG tablet Take 2 tablets (80 mg total) by mouth 2 (two) times a day. 120 tablet 10    gabapentin (NEURONTIN) 800 MG tablet TAKE 1 TABLET(800 MG) BY MOUTH THREE TIMES DAILY 270 tablet 4    HYDROcodone-acetaminophen (NORCO)  mg per tablet Take 1 tablet by mouth every 4 (four) hours as needed for Pain. 12 tablet 0    insulin aspart U-100 (NOVOLOG FLEXPEN U-100 INSULIN) 100  "unit/mL (3 mL) InPn pen INJECT 10 UNITS SUBCUTANEOUSLY THREE TIMES DAILY WITH MEALS 15 mL 0    JARDIANCE 25 mg tablet TAKE 1 TABLET EVERY DAY 90 tablet 1    lancets (TRUEPLUS LANCETS) 33 gauge Misc Use to check blood sugar twice a day 100 each 6    latanoprost 0.005 % ophthalmic solution INSTILL 1 DROP INTO BOTH EYES ONE TIME DAILY BOTTLE EXPIRES 42 DAYS AFTER OPENING... 7.5 mL 3    lisinopriL (PRINIVIL,ZESTRIL) 30 MG tablet TAKE 1 TABLET EVERY DAY 90 tablet 2    lisinopriL 10 MG tablet TAKE 1 TABLET EVERY DAY 90 tablet 2    naproxen (NAPROSYN) 500 MG tablet Take 1 tablet (500 mg total) by mouth 2 (two) times daily with meals. 20 tablet 0    omalizumab (XOLAIR) 150 mg/mL injection Inject 2 mLs (300 mg total) into the skin every 14 (fourteen) days. 4 each 11    omalizumab (XOLAIR) 75 mg/0.5 mL injection Inject 0.5 mLs (75 mg total) into the skin every 14 (fourteen) days. 2 each 11    omeprazole (PRILOSEC) 20 MG capsule Take 20 mg by mouth every morning.      pantoprazole (PROTONIX) 40 MG tablet Take 1 tablet (40 mg total) by mouth once daily. 90 tablet 1    papaverine-phentolamin-alprost 150 mg-5 mg- 50 mcg SolR 0.5 mLs by Intracavernosal route as needed (ED). 10 each ml    pen needle, diabetic (BD ULTRA-FINE SHORT PEN NEEDLE) 31 gauge x 5/16" Ndle 1 each by Misc.(Non-Drug; Combo Route) route 3 (three) times daily. 300 each 3    predniSONE (DELTASONE) 50 MG Tab Take 50 mg by mouth.      ranolazine (RANEXA) 1,000 mg Tb12 TAKE 1 TABLET TWICE DAILY 180 tablet 2    semaglutide (OZEMPIC) 2 mg/dose (8 mg/3 mL) PnIj Inject 2 mg into the skin every 7 days. 9 mL 3    sertraline (ZOLOFT) 100 MG tablet Take 1 tablet (100 mg total) by mouth every evening. 90 tablet 4    traMADoL (ULTRAM) 50 mg tablet Take 50 mg by mouth.      traZODone (DESYREL) 100 MG tablet TAKE 2 TABLETS EVERY EVENING 180 tablet 4    VIOS AEROSOL DELIVERY SYSTEM Shefali USE AS DIRESTED  0    [] doxycycline (VIBRAMYCIN) 100 MG Cap Take 1 capsule (100 mg " "total) by mouth every 12 (twelve) hours. for 10 days 20 capsule 0    EPINEPHrine (EPIPEN) 0.3 mg/0.3 mL AtIn Inject 0.3 mLs (0.3 mg total) into the muscle once. for 1 dose 2 each 3    tamsulosin (FLOMAX) 0.4 mg Cap Take 1 capsule (0.4 mg total) by mouth once daily. 90 capsule 1    [DISCONTINUED] atorvastatin (LIPITOR) 40 MG tablet TAKE 1 TABLET BY MOUTH EVERY EVENING 90 tablet 3    [DISCONTINUED] blood sugar diagnostic Strp 1 each by Misc.(Non-Drug; Combo Route) route 4 (four) times daily. 300 each 3    [DISCONTINUED] blood-glucose meter (TRUE METRIX GLUCOSE METER) kit Use as instructed to test blood glucose meter daily. 1 each 1    [DISCONTINUED] diclofenac sodium (VOLTAREN) 1 % Gel APPLY 2 GRAMS TOPICALLY FOUR TIMES DAILY 600 g 2    [DISCONTINUED] MICRO THIN LANCETS 33 gauge Misc       [DISCONTINUED] pantoprazole (PROTONIX) 40 MG tablet Take 1 tablet (40 mg total) by mouth once daily. 90 tablet 1    [DISCONTINUED] sertraline (ZOLOFT) 100 MG tablet TAKE 1 TABLET EVERY EVENING 90 tablet 4     No facility-administered encounter medications on file as of 8/9/2023.       Objective:     Vital Signs (Most Recent)  Vital Signs  Pulse: 77  Resp: 18  SpO2: 95 %  BP: 112/76  Height and Weight  Height: 5' 5" (165.1 cm)  Weight: 85.8 kg (189 lb 2.5 oz)  BSA (Calculated - sq m): 1.98 sq meters  BMI (Calculated): 31.5  Weight in (lb) to have BMI = 25: 149.9]  Wt Readings from Last 2 Encounters:   08/09/23 85.8 kg (189 lb 2.5 oz)   07/13/23 90 kg (198 lb 6.6 oz)       Physical Exam   Constitutional: He is oriented to person, place, and time. He appears well-developed and well-nourished. No distress.   HENT:   Head: Normocephalic.   Cardiovascular: Normal rate and regular rhythm.   Pulmonary/Chest: Normal expansion and effort normal. No stridor. No respiratory distress. He has decreased breath sounds. He exhibits no tenderness.   Abdominal: He exhibits no distension.   Musculoskeletal:         General: No tenderness.      Cervical " "back: Neck supple.   Lymphadenopathy:     He has no cervical adenopathy.   Neurological: He is alert and oriented to person, place, and time. Gait abnormal.   Skin: Skin is warm. No cyanosis. Nails show no clubbing.   Psychiatric: He has a normal mood and affect. His behavior is normal. Judgment and thought content normal.   Nursing note and vitals reviewed.      Laboratory  Lab Results   Component Value Date    WBC 8.00 03/31/2023    RBC 4.36 (L) 03/31/2023    HGB 11.2 (L) 03/31/2023    HCT 35.0 (L) 03/31/2023    MCV 80 (L) 03/31/2023    MCH 25.7 (L) 03/31/2023    MCHC 32.0 03/31/2023    RDW 17.2 (H) 03/31/2023     03/31/2023    MPV 10.1 03/31/2023    GRAN 5.2 03/31/2023    GRAN 64.8 03/31/2023    LYMPH 1.9 03/31/2023    LYMPH 24.3 03/31/2023    MONO 0.7 03/31/2023    MONO 8.9 03/31/2023    EOS 0.1 03/31/2023    BASO 0.01 03/31/2023    EOSINOPHIL 1.8 03/31/2023    BASOPHIL 0.1 03/31/2023       BMP  Lab Results   Component Value Date     07/28/2023    K 4.8 07/28/2023     07/28/2023    CO2 22 07/28/2023    BUN 41 (H) 07/28/2023    CREATININE 1.73 (H) 07/28/2023    CALCIUM 8.6 (L) 07/28/2023    ANIONGAP 10 07/28/2023    ESTGFRAFRICA 43 07/28/2023    EGFRNONAA >60 07/31/2022    AST 15 03/31/2023    ALT 12 03/31/2023    PROT 7.1 03/31/2023       Lab Results   Component Value Date     (H) 07/09/2023     (H) 07/07/2023    BNP 96 05/22/2023     (H) 10/24/2022     (H) 09/23/2022     (H) 08/02/2022       Lab Results   Component Value Date    TSH 0.881 05/23/2023       Lab Results   Component Value Date    SEDRATE 92 (H) 09/11/2020       Lab Results   Component Value Date    CRP 97.4 (H) 09/11/2020     Lab Results   Component Value Date    IGE 1343 (H) 04/20/2023    IGE 1367 (H) 09/07/2022        No results found for: "ASPERGILLUS"  No results found for: "AFUMIGATUSCL"     No results found for: "ACE"     Nuclear stress test August 2022         Echo 5/2023       " INDICATIONS:   Heart failure, Hypertension, and Paroxysmal atrial fibrillation.     -----------------------      FINDINGS:   Left Ventricle: Normal left ventricular cavity size. Low normal left ventricular systolic   function. LVEF 50 - 55%. Moderate (Grade II) diastolic dysfunction (pseudonormal   filling). Mild concentric hypertrophy.   Right Ventricle: Borderline right ventricular cavity size. Mild right ventricular   hypokinesis.   Left Atrium: The left atrium is normal in size.   Right Atrium: Mildly dilated right atrium.   Mitral Valve: No or trivial mitral valve regurgitation. No mitral valve stenosis. Mild   mitral annular calcification.   Aortic Valve: The aortic valve has a tricuspid configuration. No or trivial aortic valve   regurgitation. No aortic valve stenosis. Aortic valve cusps appear mildly calcified. AV   peak PG 11 mmHg   Tricuspid Valve: No or trivial tricuspid valve regurgitation. No tricuspid valve   stenosis. The TR jet is insufficient to accurately estimate RVSP/PASP.   Pulmonic Valve: No or trivial pulmonic valve regurgitation.   Pericardium: Normal pericardium without evidence of pericardial effusion.   Aorta: Normal aortic root size.   IVC: Normal IVC size with <50% collapse with inspiration. Intermediate estimated RAP   around 5-10 mmHg.          2D echo July 2022       The left ventricle is normal in size with severe concentric hypertrophy and mildly decreased systolic function.  Mild left atrial enlargement.  The estimated ejection fraction is 40 - 45%.  Grade III left ventricular diastolic dysfunction.  Normal right ventricular size with mildly reduced right ventricular systolic function.  Mild tricuspid regurgitation.  Mild pulmonic regurgitation.  Elevated central venous pressure (15 mmHg).  The estimated PA systolic pressure is 45 mmHg.  There is pulmonary hypertension.      PFT 2017 personal reviewed mild restriction and mild DLCO reduction       Diagnostic Results:  I have  personally reviewed today the following studies:    Chest x-ray July 2022 left basilar opacity     Diagnostic polysomnography 2017     noted and discussed.  The diagnostic polysomnography revealed a probably severe obstructive sleep apnea / hypopnea syndrome  (A + H Index = 103.9 events / hr asleep with 87.7 arousals / hr asleep for the study, and a mean SpO2 value of 92.9 %,  moderate, minimum oxygen saturation during sleep of 80.0 %, and waking baseline SpO2 = 96 %. There were no RERAs  (respiratory effort - related arousals). Sporadic, moderately loud snoring was noted.          Titration October 2022         Assessment/Plan:   Dermatomyositis  -     Ambulatory referral/consult to Rheumatology; Future; Expected date: 08/16/2023    Uncontrolled persistent asthma  -     Ambulatory referral/consult to Pulmonary Disease Management w/ Respiratory Therapist; Future; Expected date: 08/16/2023    Immunocompromised    Cardiac asthma    Chronic systolic congestive heart failure    Elevated IgE level        Continue albuterol p.r.n.     Continue Trelegy Ellipta 200 mcg 1 puff daily.      I messaged Allergy and immunology staff to schedule patient for follow-up for Xolair therapy.      I messaged Rheumatology to schedule patient for follow-up for dermatomyositis.      He was on Imuran for dermatomyositis.          Follow up in about 6 months (around 2/9/2024).    This note was prepared using voice recognition system and is likely to have sound alike errors that may have been overlooked even after proof reading.  Please call me with any questions    Discussed diagnosis, its evaluation, treatment and usual course. All questions answered.      Osvaldo Kern MD

## 2023-08-09 NOTE — TELEPHONE ENCOUNTER
----- Message from Osvaldo Kern MD sent at 8/9/2023  9:26 AM CDT -----  Pt of dr Hills needs folo up for dermatomysosistis plsherman randolphange

## 2023-08-16 ENCOUNTER — TELEPHONE (OUTPATIENT)
Dept: INTERNAL MEDICINE | Facility: CLINIC | Age: 68
End: 2023-08-16
Payer: MEDICARE

## 2023-08-16 NOTE — TELEPHONE ENCOUNTER
----- Message from Cami Hull sent at 8/16/2023  3:41 PM CDT -----  Contact: self [phone]  Patient called in this afternoon requesting a call back to discuss his oxygen tank . Please call back [phone]. Thanks katheryn

## 2023-08-16 NOTE — TELEPHONE ENCOUNTER
Spoke with the patient stated that he has concerns with his oxygen tank. The patient was informed that he need to follow up with pulmonary. The patient was transferred to the appointment desk to assist him.

## 2023-08-17 ENCOUNTER — TELEPHONE (OUTPATIENT)
Dept: PODIATRY | Facility: CLINIC | Age: 68
End: 2023-08-17
Payer: MEDICARE

## 2023-08-17 ENCOUNTER — TELEPHONE (OUTPATIENT)
Dept: PULMONOLOGY | Facility: CLINIC | Age: 68
End: 2023-08-17
Payer: MEDICARE

## 2023-08-17 DIAGNOSIS — J96.11 CHRONIC RESPIRATORY FAILURE WITH HYPOXIA: Primary | ICD-10-CM

## 2023-08-17 NOTE — TELEPHONE ENCOUNTER
----- Message from Ron Vazquez MD sent at 8/17/2023  9:04 AM CDT -----  Contact: Crow  Sent new order for oxygen concentrator with portable concentrator  Ron  ----- Message -----  From: Doreen Kamara LPN  Sent: 8/16/2023   3:59 PM CDT  To: Ron Vazquez MD      ----- Message -----  From: Nina Dubon  Sent: 8/16/2023   3:57 PM CDT  To: George TIERNEY Staff    Pt is calling in regards to getting a light weight oxygen tank. Please call back at [phone]        Thanks  SW

## 2023-08-17 NOTE — TELEPHONE ENCOUNTER
Spoke with Mr Galvez to reschedule him for 8/25 @ 10:30.            ----- Message from Angélica Buck sent at 8/17/2023  8:28 AM CDT -----  Contact: Crow  Type:  Sooner Apoointment Request    Caller is requesting a sooner appointment. Caller will not accept being placed on the waitlist and is requesting a message be sent to doctor.  Name of Caller:Crow  When is the first available appointment?8/17/23  Symptoms:3 month nail care  Would the patient rather a call back or a response via [facility]? Call  Best Call Back Number:[phone]  Additional Information:Patient reports unable to arrive to visit due to transportation issues and request to reschedule for 8/22/23.  Thank you,  GH

## 2023-08-25 ENCOUNTER — OFFICE VISIT (OUTPATIENT)
Dept: PODIATRY | Facility: CLINIC | Age: 68
End: 2023-08-25
Payer: MEDICARE

## 2023-08-25 VITALS — BODY MASS INDEX: 31.51 KG/M2 | HEIGHT: 65 IN | WEIGHT: 189.13 LBS

## 2023-08-25 DIAGNOSIS — E11.49 TYPE II DIABETES MELLITUS WITH NEUROLOGICAL MANIFESTATIONS: Primary | ICD-10-CM

## 2023-08-25 DIAGNOSIS — L84 PRE-ULCERATIVE CALLUSES: ICD-10-CM

## 2023-08-25 DIAGNOSIS — B35.1 DERMATOPHYTOSIS OF NAIL: ICD-10-CM

## 2023-08-25 DIAGNOSIS — S98.112A AMPUTATED GREAT TOE, LEFT: ICD-10-CM

## 2023-08-25 PROCEDURE — 11055 PR TRIM HYPERKERATOTIC SKIN LESION, ONE: ICD-10-PCS | Mod: Q7,S$GLB,, | Performed by: PODIATRIST

## 2023-08-25 PROCEDURE — 99999 PR PBB SHADOW E&M-EST. PATIENT-LVL IV: CPT | Mod: PBBFAC,,, | Performed by: PODIATRIST

## 2023-08-25 PROCEDURE — 99499 UNLISTED E&M SERVICE: CPT | Mod: S$GLB,,, | Performed by: PODIATRIST

## 2023-08-25 PROCEDURE — 11721 DEBRIDE NAIL 6 OR MORE: CPT | Mod: 59,Q7,S$GLB, | Performed by: PODIATRIST

## 2023-08-25 PROCEDURE — 99499 NO LOS: ICD-10-PCS | Mod: S$GLB,,, | Performed by: PODIATRIST

## 2023-08-25 PROCEDURE — 99999 PR PBB SHADOW E&M-EST. PATIENT-LVL IV: ICD-10-PCS | Mod: PBBFAC,,, | Performed by: PODIATRIST

## 2023-08-25 PROCEDURE — 11055 PARING/CUTG B9 HYPRKER LES 1: CPT | Mod: Q7,S$GLB,, | Performed by: PODIATRIST

## 2023-08-25 PROCEDURE — 11721 PR DEBRIDEMENT OF NAILS, 6 OR MORE: ICD-10-PCS | Mod: 59,Q7,S$GLB, | Performed by: PODIATRIST

## 2023-09-06 NOTE — PROGRESS NOTES
Subjective:     Patient ID: Crow Green is a 68 y.o. male.    Chief Complaint: Nail Care (3 month nail care, tingling bilateral, pt is wearing tennis shoes and socks, pt was last seen on 07/13/23/ Lakeisha Pastor)      Crow is a 68 y.o. male who presents to the clinic for evaluation and treatment of high risk feet. Crow has a past medical history of Arthritis, Asthma, Atrial fibrillation, Atrial fibrillation with RVR (08/07/2019), Carotid artery stenosis, CHF (congestive heart failure), Chronic obstructive pulmonary disease (08/05/2020), Depression, Dermatomyositis, Diabetes mellitus, type 2 (Diagnosed in 2000), Elevated PSA, Follow up (07/30/2020), Hypertension, Myocardial infarction, and Sleep apnea. The patient's chief complaint is long, thick toenails. This patient has documented high risk feet requiring routine maintenance secondary to diabetes mellitis and those secondary complications of diabetes, as mentioned..    PCP: Cm Polanco MD    Date Last Seen by PCP: 07/13/2023    Current shoe gear:  Affected Foot: Rx diabetic extra depth shoes and custom accommodative insoles     Unaffected Foot: Rx diabetic extra depth shoes and custom accommodative insoles    Hemoglobin A1C   Date Value Ref Range Status   05/23/2023 6.2 <=6.5 % Final   01/04/2023 6.3 (H) 4.0 - 5.6 % Final     Comment:     ADA Screening Guidelines:  5.7-6.4%  Consistent with prediabetes  >or=6.5%  Consistent with diabetes    High levels of fetal hemoglobin interfere with the HbA1C  assay. Heterozygous hemoglobin variants (HbS, HgC, etc)do  not significantly interfere with this assay.   However, presence of multiple variants may affect accuracy.     07/31/2022 6.0 (H) 4.0 - 5.6 % Final     Comment:     ADA Screening Guidelines:  5.7-6.4%  Consistent with prediabetes  >or=6.5%  Consistent with diabetes    High levels of fetal hemoglobin interfere with the HbA1C  assay. Heterozygous hemoglobin variants (HbS, HgC, etc)do  not  significantly interfere with this assay.   However, presence of multiple variants may affect accuracy.     06/10/2022 6.4 (H) 4.0 - 5.6 % Final     Comment:     ADA Screening Guidelines:  5.7-6.4%  Consistent with prediabetes  >or=6.5%  Consistent with diabetes    High levels of fetal hemoglobin interfere with the HbA1C  assay. Heterozygous hemoglobin variants (HbS, HgC, etc)do  not significantly interfere with this assay.   However, presence of multiple variants may affect accuracy.         Patient Active Problem List   Diagnosis    ED (erectile dysfunction)    Non-proliferative diabetic retinopathy, mild, both eyes    Essential hypertension    Diabetic polyneuropathy    Nonobstructive coronary artery disease of native artery of native heart    Chronic combined systolic and diastolic heart failure    Type 2 diabetes mellitus    SANDRITA (obstructive sleep apnea)    H/O Asthma    Psychophysiological insomnia    Nightmares    Sleep related gastroesophageal reflux disease    Inadequate sleep hygiene    Chest pain    PAF (paroxysmal atrial fibrillation)    At risk for medication noncompliance    Obesity (BMI 30.0-34.9)    Indeterminate stage chronic open angle glaucoma    Right hip pain    Gait instability    Chronic right shoulder pain    Arthritis    Left sided sciatica    Osteoarthritis of spine with radiculopathy, lumbar region    HNP (herniated nucleus pulposus), lumbar    Gastroesophageal reflux disease without esophagitis    Hypogonadism in male    Disorder of parathyroid gland    Hyperlipidemia associated with type 2 diabetes mellitus    Traumatic tear of left rotator cuff    Other chronic pain    Left shoulder pain    Complete tear of left rotator cuff    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 associated with type 2 diabetes mellitus    Lumbar radiculopathy    Diabetic ulcer of toe of left foot associated with type 2 diabetes mellitus, with necrosis of muscle    Ulcer of left  foot    Elevated troponin    Hypotension due to medication    Dermatomyositis    Postprocedural hypotension    Advanced directives, counseling/discussion    Schizoaffective disorder, depressive type    Chronic obstructive pulmonary disease    MDD (major depressive disorder), recurrent episode, moderate    DOMINIK (generalized anxiety disorder)    Bilateral carpal tunnel syndrome    Rotator cuff tear arthropathy of left shoulder    Sacroiliac dysfunction    Atherosclerosis of native coronary artery of native heart with unstable angina pectoris    Coronary vasospasm    Cocaine abuse    Osteopenia    Atherosclerosis of aorta    Cardiac asthma    Chronic gout of multiple sites    Acquired absence of left great toe    Acute on chronic respiratory failure with hypoxia    Hypoxia    Cardiomyopathy    Bilateral carotid artery stenosis    Substance abuse(UDS + for cocaine )    Immunocompromised patient    Other reduced mobility    Abnormality of gait and mobility    Dependence on supplemental oxygen    Positive depression screening    Pulmonary hypertension- echo 7/2022       Medication List with Changes/Refills   Current Medications    ALBUTEROL (PROVENTIL) 2.5 MG /3 ML (0.083 %) NEBULIZER SOLUTION    INHALE THE CONTENTS OF 1 VIAL VIA NEBULIZER EVERY 4 TO 6 HOURS AS NEEDED FOR WHEEZING OR FOR SHORTNESS OF BREATH    ALLOPURINOL (ZYLOPRIM) 100 MG TABLET    Take 1 tablet (100 mg total) by mouth once daily.    AMLODIPINE (NORVASC) 2.5 MG TABLET    Take 1 tablet (2.5 mg total) by mouth once daily.    APIXABAN (ELIQUIS) 5 MG TAB    TAKE 1 TABLET TWICE DAILY    ASPIRIN (ECOTRIN) 81 MG EC TABLET    Take 1 tablet (81 mg total) by mouth once daily.    ATORVASTATIN (LIPITOR) 40 MG TABLET    Take 1 tablet (40 mg total) by mouth every evening.    AZATHIOPRINE (IMURAN) 50 MG TAB    Take 1 tablet (50 mg total) by mouth once daily.    BLOOD SUGAR DIAGNOSTIC STRP    1 each by Misc.(Non-Drug; Combo Route) route 4 (four) times daily.     BLOOD-GLUCOSE METER MISC    Use as instructed to test blood glucose meter daily.    BRINZOLAMIDE-BRIMONIDINE (SIMBRINZA) 1-0.2 % DRPS    Place 1 drop into both eyes 3 (three) times daily.    EPINEPHRINE (EPIPEN) 0.3 MG/0.3 ML ATIN    Inject 0.3 mLs (0.3 mg total) into the muscle once. for 1 dose    FLUTICASONE-UMECLIDIN-VILANTER (TRELEGY ELLIPTA) 200-62.5-25 MCG INHALER    Inhale 1 puff into the lungs once daily.    FUROSEMIDE (LASIX) 40 MG TABLET    Take 2 tablets (80 mg total) by mouth 2 (two) times a day.    GABAPENTIN (NEURONTIN) 800 MG TABLET    TAKE 1 TABLET(800 MG) BY MOUTH THREE TIMES DAILY    HYDROCODONE-ACETAMINOPHEN (NORCO)  MG PER TABLET    Take 1 tablet by mouth every 4 (four) hours as needed for Pain.    INSULIN ASPART U-100 (NOVOLOG FLEXPEN U-100 INSULIN) 100 UNIT/ML (3 ML) INPN PEN    INJECT 10 UNITS SUBCUTANEOUSLY THREE TIMES DAILY WITH MEALS    JARDIANCE 25 MG TABLET    TAKE 1 TABLET EVERY DAY    LANCETS (TRUEPLUS LANCETS) 33 GAUGE MISC    Use to check blood sugar twice a day    LATANOPROST 0.005 % OPHTHALMIC SOLUTION    INSTILL 1 DROP INTO BOTH EYES ONE TIME DAILY BOTTLE EXPIRES 42 DAYS AFTER OPENING...    LISINOPRIL (PRINIVIL,ZESTRIL) 30 MG TABLET    TAKE 1 TABLET EVERY DAY    LISINOPRIL 10 MG TABLET    TAKE 1 TABLET EVERY DAY    NAPROXEN (NAPROSYN) 500 MG TABLET    Take 1 tablet (500 mg total) by mouth 2 (two) times daily with meals.    OMALIZUMAB (XOLAIR) 150 MG/ML INJECTION    Inject 2 mLs (300 mg total) into the skin every 14 (fourteen) days.    OMALIZUMAB (XOLAIR) 75 MG/0.5 ML INJECTION    Inject 0.5 mLs (75 mg total) into the skin every 14 (fourteen) days.    OMEPRAZOLE (PRILOSEC) 20 MG CAPSULE    Take 20 mg by mouth every morning.    PANTOPRAZOLE (PROTONIX) 40 MG TABLET    Take 1 tablet (40 mg total) by mouth once daily.    PAPAVERINE-PHENTOLAMIN-ALPROST 150 MG-5 MG- 50 MCG SOLR    0.5 mLs by Intracavernosal route as needed (ED).    PEN NEEDLE, DIABETIC (BD ULTRA-FINE SHORT PEN  "NEEDLE) 31 GAUGE X 5/16" NDLE    1 each by Misc.(Non-Drug; Combo Route) route 3 (three) times daily.    PREDNISONE (DELTASONE) 50 MG TAB    Take 50 mg by mouth.    RANOLAZINE (RANEXA) 1,000 MG TB12    TAKE 1 TABLET TWICE DAILY    SEMAGLUTIDE (OZEMPIC) 2 MG/DOSE (8 MG/3 ML) PNIJ    Inject 2 mg into the skin every 7 days.    SERTRALINE (ZOLOFT) 100 MG TABLET    Take 1 tablet (100 mg total) by mouth every evening.    TAMSULOSIN (FLOMAX) 0.4 MG CAP    Take 1 capsule (0.4 mg total) by mouth once daily.    TRAMADOL (ULTRAM) 50 MG TABLET    Take 50 mg by mouth.    TRAZODONE (DESYREL) 100 MG TABLET    TAKE 2 TABLETS EVERY EVENING    VIOS AEROSOL DELIVERY SYSTEM VIN    USE AS DIRESTED       Review of patient's allergies indicates:   Allergen Reactions    Protamine Hives     Urticaria, possible upper airway swelling       Past Surgical History:   Procedure Laterality Date    ABLATION OF ARRHYTHMOGENIC FOCUS FOR ATRIAL FIBRILLATION N/A 2/27/2020    Procedure: Ablation atrial fibrillation;  Surgeon: Gio Brown MD;  Location: [facility] EP LAB;  Service: Cardiology;  Laterality: N/A;  afib, DAMIEN (cx if SR), PVI, PITO, anes, MB, 3 Prep    CHOLECYSTECTOMY      CLOSURE OF WOUND Left 7/1/2020    Procedure: CLOSURE, WOUND;  Surgeon: Barb Veras DPM;  Location: [facility] OR;  Service: Podiatry;  Laterality: Left;    COLONOSCOPY N/A 3/4/2022    Procedure: COLONOSCOPY;  Surgeon: Yoils Freitas MD;  Location: [facility] ENDO;  Service: Endoscopy;  Laterality: N/A;    ESOPHAGOGASTRODUODENOSCOPY N/A 3/4/2022    Procedure: EGD (ESOPHAGOGASTRODUODENOSCOPY);  Surgeon: Yolis Freitas MD;  Location: [facility] ENDO;  Service: Endoscopy;  Laterality: N/A;    INJECTION OF ANESTHETIC AGENT INTO SACROILIAC JOINT Left 3/24/2021    Procedure: Left BLOCK, SACROILIAC JOINT and bilateral rhomboid TPI;  Surgeon: Dillan Tsai MD;  Location: [facility] MGT;  Service: Pain Management;  Laterality: Left;    INTRALUMINAL GASTROINTESTINAL TRACT IMAGING VIA " CAPSULE N/A 3/22/2022    Procedure: IMAGING PROCEDURE, GI TRACT, INTRALUMINAL, VIA CAPSULE;  Surgeon: Justice Martinez RN;  Location: [facility] ENDO;  Service: Endoscopy;  Laterality: N/A;    REVERSE TOTAL SHOULDER ARTHROPLASTY Left 1/10/2022    Procedure: ARTHROPLASTY, SHOULDER, TOTAL, REVERSE;  Surgeon: Anthony Alvarado MD;  Location: [facility] OR;  Service: Orthopedics;  Laterality: Left;  left reverse total shoulder arthroplasty     SELECTIVE INJECTION OF ANESTHETIC AGENT AROUND LUMBAR SPINAL NERVE ROOT BY TRANSFORAMINAL APPROACH Left 6/11/2020    Procedure: BLOCK, SPINAL NERVE ROOT, LUMBAR, SELECTIVE, TRANSFORAMINAL APPROACH Left L4-5, L5-S1 TFESI with RN IV sedation;  Surgeon: Dillan Tsai MD;  Location: [facility] PAIN MGT;  Service: Pain Management;  Laterality: Left;    TOE AMPUTATION Left 6/29/2020    Procedure: AMPUTATION, TOE;  Surgeon: Barb Veras DPM;  Location: [facility] OR;  Service: Podiatry;  Laterality: Left;  great toe       Family History   Problem Relation Age of Onset    Hypertension Mother     Glaucoma Mother     Cataracts Mother     Diabetes Mother     Cataracts Father     Stroke Father     Glaucoma Sister         x3    Cataracts Sister         x3    Diabetes Sister         x1    Stroke Sister         x1    No Known Problems Brother     No Known Problems Daughter     No Known Problems Son     Glaucoma Maternal Aunt     Diabetes Maternal Aunt     Cancer Maternal Grandfather         lung (smoker)    Prostate cancer Neg Hx     Heart disease Neg Hx     Kidney disease Neg Hx        Social History     Socioeconomic History    Marital status: Legally     Number of children: 5   Occupational History    Occupation: disabled    Occupation: PT at [employer]    Tobacco Use    Smoking status: Never    Smokeless tobacco: Never   Substance and Sexual Activity    Alcohol use: Yes     Comment: rare, maybe holidays    Drug use: Not Currently     Types: Cocaine    Sexual activity: Not Currently  "    Partners: Female   Social History Narrative    Utilizing [employer] transportation which has been unreliable.      Social Determinants of Health     Financial Resource Strain: Low Risk  (12/7/2022)    Overall Financial Resource Strain (CARDIA)     Difficulty of Paying Living Expenses: Not very hard   Food Insecurity: No Food Insecurity (12/7/2022)    Hunger Vital Sign     Worried About Running Out of Food in the Last Year: Never true     Ran Out of Food in the Last Year: Never true   Transportation Needs: Unmet Transportation Needs (12/7/2022)    PRAPARE - Transportation     Lack of Transportation (Medical): Yes     Lack of Transportation (Non-Medical): Yes   Physical Activity: Inactive (12/7/2022)    Exercise Vital Sign     Days of Exercise per Week: 0 days     Minutes of Exercise per Session: 0 min   Stress: Stress Concern Present (12/7/2022)    [employer] [employer] - Occupational Stress Questionnaire     Feeling of Stress : Rather much   Social Connections: Moderately Isolated (12/7/2022)    Social Connection and Isolation Panel [NHANES]     Frequency of Communication with Friends and Family: Once a week     Frequency of Social Gatherings with Friends and Family: Once a week     Attends Congregation Services: More than 4 times per year     Active Member of Clubs or Organizations: No     Attends Club or Organization Meetings: More than 4 times per year     Marital Status:    Housing Stability: Low Risk  (12/7/2022)    Housing Stability Vital Sign     Unable to Pay for Housing in the Last Year: No     Number of Places Lived in the Last Year: 1     Unstable Housing in the Last Year: No       Vitals:    08/25/23 0952   Weight: 85.8 kg (189 lb 2.5 oz)   Height: 5' 5" (1.651 m)   PainSc: 0-No pain       Hemoglobin A1C   Date Value Ref Range Status   05/23/2023 6.2 <=6.5 % Final   01/04/2023 6.3 (H) 4.0 - 5.6 % Final     Comment:     ADA Screening Guidelines:  5.7-6.4%  Consistent with " prediabetes  >or=6.5%  Consistent with diabetes    High levels of fetal hemoglobin interfere with the HbA1C  assay. Heterozygous hemoglobin variants (HbS, HgC, etc)do  not significantly interfere with this assay.   However, presence of multiple variants may affect accuracy.     07/31/2022 6.0 (H) 4.0 - 5.6 % Final     Comment:     ADA Screening Guidelines:  5.7-6.4%  Consistent with prediabetes  >or=6.5%  Consistent with diabetes    High levels of fetal hemoglobin interfere with the HbA1C  assay. Heterozygous hemoglobin variants (HbS, HgC, etc)do  not significantly interfere with this assay.   However, presence of multiple variants may affect accuracy.     06/10/2022 6.4 (H) 4.0 - 5.6 % Final     Comment:     ADA Screening Guidelines:  5.7-6.4%  Consistent with prediabetes  >or=6.5%  Consistent with diabetes    High levels of fetal hemoglobin interfere with the HbA1C  assay. Heterozygous hemoglobin variants (HbS, HgC, etc)do  not significantly interfere with this assay.   However, presence of multiple variants may affect accuracy.         Review of Systems   Constitutional:  Negative for chills and fever.   Respiratory:  Negative for shortness of breath.    Cardiovascular:  Negative for chest pain, palpitations, orthopnea, claudication and leg swelling.   Gastrointestinal:  Negative for diarrhea, nausea and vomiting.   Musculoskeletal:  Negative for joint pain.   Skin:  Negative for rash.   Neurological:  Positive for tingling and sensory change.   Psychiatric/Behavioral: Negative.               Objective:      PHYSICAL EXAM: Apperance: Alert and orient in no distress,well developed, and with good attention to grooming and body habits  Patient presents ambulating in diabetic shoes and inserts.   LOWER EXTREMITY EXAM:  VASCULAR: Dorsalis pedis pulses 2/4 bilateral and Posterior Tibial pulses 2/4 bilateral. Capillary fill time <4 seconds bilateral. No edema observed bilateral. Varicosities absent bilateral. Skin  temperature of the lower extremities is warm to warm, proximal to distal. Hair growth dim bilateral.  DERMATOLOGICAL: No skin rashes, subcutaneous nodules, lesions, or ulcers observed bilateral. Nails 1,2,3,4,5 right and 2,3,4,5 left elongated, thickened, and discolored with subungual debris. Webspaces 1,2,3,4 bilateral clean, dry and without evidence of break in skin integrity. Mild hyperkeratotic tissue noted to left medial 1st MPJ and distal hallux amputation stump.   NEUROLOGICAL: Light touch, sharp-dull, proprioception all present and equal bilaterally.  Vibratory sensation diminished at bilateral hallux. Protective sensation absent sites as tested with a [place]-[employer] 5.07 monofilament.   MUSCULOSKELETAL: Muscle strength is 5/5 for foot inverters, everters, plantarflexors, and dorsiflexors. Muscle tone is normal. Left hallux amputation noted.           Assessment:       ICD-10-CM ICD-9-CM   1. Type II diabetes mellitus with neurological manifestations  E11.49 250.60   2. Pre-ulcerative calluses - Left Foot  L84 700   3. Dermatophytosis of nail  B35.1 110.1   4. Amputated great toe, left - Left Foot  S98.112A 895.0         Plan:   Type II diabetes mellitus with neurological manifestations  -     DIABETIC SHOES FOR HOME USE    Pre-ulcerative calluses - Left Foot  -     DIABETIC SHOES FOR HOME USE    Dermatophytosis of nail    Amputated great toe, left - Left Foot  -     DIABETIC SHOES FOR HOME USE      I counseled the patient on his conditions, regarding findings of my examination, my impressions, and usual treatment plan.   Appointment spent on education about the diabetic foot, neuropathy, and prevention of limb loss.  Shoe inspection. Diabetic Foot Education. Patient reminded of the importance of good nutrition and blood sugar control to help prevent podiatric complications of diabetes. Patient instructed on proper foot hygeine. We discussed wearing proper shoe gear, daily foot inspections, never walking  without protective shoe gear, never putting sharp instruments to feet.    With patient's permission, nails 1-5 bilateral were debrided/trimmed in length and thickness aggressively to their soft tissue attachment mechanically and with electric , removing all offending nail and debris. Patient relates relief following the procedure.  With patient's permission, left foot pre-ulcerative callus trimmed in thickness with #15 blade in thickness without incident.   Prescription written for Diabetic shoes and inserts.   Patient  will continue to monitor the areas daily, inspect feet, wear protective shoe gear when ambulatory, moisturizer to maintain skin integrity. Patient reminded of the importance of good nutrition and blood sugar control to help prevent podiatric complications of diabetes.  Patient to return 4 months or sooner if needed.       This is and addendum to note for the patient's left partial 1st ray amputation. When the patients are missing that particular extremity it causes abnormal pressure during gait and stance. The midfoot typically collapses medially causing deformities which also cause pressure areas which lead to callused areas which leads to ulcerations which could lead to further amputation if not assisted by a full carbon plate partial foot device. Patient is also at a fall risk without this device because it defiantly effects balance.        Barb Veras, MONTY  [facility]

## 2023-09-26 NOTE — ASSESSMENT & PLAN NOTE
Recommended that he increase the Lantus to 80 units nightly.  When he starts on the prednisone for rash he should increase the mealtime insulin by 5 units.   Fair

## 2023-09-29 ENCOUNTER — OFFICE VISIT (OUTPATIENT)
Dept: INTERNAL MEDICINE | Facility: CLINIC | Age: 68
End: 2023-09-29
Payer: MEDICARE

## 2023-09-29 ENCOUNTER — LAB VISIT (OUTPATIENT)
Dept: LAB | Facility: HOSPITAL | Age: 68
End: 2023-09-29
Attending: PHYSICIAN ASSISTANT
Payer: MEDICARE

## 2023-09-29 VITALS
SYSTOLIC BLOOD PRESSURE: 116 MMHG | DIASTOLIC BLOOD PRESSURE: 70 MMHG | OXYGEN SATURATION: 97 % | HEIGHT: 65 IN | HEART RATE: 76 BPM | WEIGHT: 198.63 LBS | RESPIRATION RATE: 12 BRPM | TEMPERATURE: 97 F | BODY MASS INDEX: 33.09 KG/M2

## 2023-09-29 DIAGNOSIS — M1A.09X0 CHRONIC GOUT OF MULTIPLE SITES, UNSPECIFIED CAUSE: ICD-10-CM

## 2023-09-29 DIAGNOSIS — E11.3299 TYPE 2 DIABETES MELLITUS WITH MILD NONPROLIFERATIVE RETINOPATHY, WITH LONG-TERM CURRENT USE OF INSULIN, MACULAR EDEMA PRESENCE UNSPECIFIED, UNSPECIFIED LATERALITY: ICD-10-CM

## 2023-09-29 DIAGNOSIS — Z91.89 AT RISK FOR NONCOMPLIANCE: ICD-10-CM

## 2023-09-29 DIAGNOSIS — E11.3293 MILD NONPROLIFERATIVE DIABETIC RETINOPATHY OF BOTH EYES ASSOCIATED WITH TYPE 2 DIABETES MELLITUS, MACULAR EDEMA PRESENCE UNSPECIFIED: ICD-10-CM

## 2023-09-29 DIAGNOSIS — R07.9 CHEST PAIN, UNSPECIFIED TYPE: ICD-10-CM

## 2023-09-29 DIAGNOSIS — E11.69 HYPERLIPIDEMIA ASSOCIATED WITH TYPE 2 DIABETES MELLITUS: ICD-10-CM

## 2023-09-29 DIAGNOSIS — E11.65 UNCONTROLLED TYPE 2 DIABETES MELLITUS WITH HYPERGLYCEMIA: ICD-10-CM

## 2023-09-29 DIAGNOSIS — I48.0 PAF (PAROXYSMAL ATRIAL FIBRILLATION): Chronic | ICD-10-CM

## 2023-09-29 DIAGNOSIS — I25.118 CORONARY ARTERY DISEASE OF NATIVE ARTERY OF NATIVE HEART WITH STABLE ANGINA PECTORIS: Chronic | ICD-10-CM

## 2023-09-29 DIAGNOSIS — E11.3219 TYPE 2 DIABETES MELLITUS WITH MILD NONPROLIFERATIVE RETINOPATHY AND MACULAR EDEMA, WITH LONG-TERM CURRENT USE OF INSULIN, UNSPECIFIED LATERALITY: Primary | ICD-10-CM

## 2023-09-29 DIAGNOSIS — E11.3312 MODERATE NONPROLIFERATIVE DIABETIC RETINOPATHY OF LEFT EYE WITH MACULAR EDEMA ASSOCIATED WITH TYPE 2 DIABETES MELLITUS: ICD-10-CM

## 2023-09-29 DIAGNOSIS — M53.3 SACROILIAC DYSFUNCTION: ICD-10-CM

## 2023-09-29 DIAGNOSIS — M33.13 DERMATOMYOSITIS: ICD-10-CM

## 2023-09-29 DIAGNOSIS — N52.9 ERECTILE DYSFUNCTION, UNSPECIFIED ERECTILE DYSFUNCTION TYPE: ICD-10-CM

## 2023-09-29 DIAGNOSIS — Z79.4 TYPE 2 DIABETES MELLITUS WITH MILD NONPROLIFERATIVE RETINOPATHY AND MACULAR EDEMA, WITH LONG-TERM CURRENT USE OF INSULIN, UNSPECIFIED LATERALITY: Primary | ICD-10-CM

## 2023-09-29 DIAGNOSIS — Z79.4 TYPE 2 DIABETES MELLITUS WITH MILD NONPROLIFERATIVE RETINOPATHY, WITH LONG-TERM CURRENT USE OF INSULIN, MACULAR EDEMA PRESENCE UNSPECIFIED, UNSPECIFIED LATERALITY: ICD-10-CM

## 2023-09-29 DIAGNOSIS — M47.26 OSTEOARTHRITIS OF SPINE WITH RADICULOPATHY, LUMBAR REGION: ICD-10-CM

## 2023-09-29 DIAGNOSIS — E78.5 HYPERLIPIDEMIA ASSOCIATED WITH TYPE 2 DIABETES MELLITUS: ICD-10-CM

## 2023-09-29 DIAGNOSIS — I25.110 ATHEROSCLEROSIS OF NATIVE CORONARY ARTERY OF NATIVE HEART WITH UNSTABLE ANGINA PECTORIS: ICD-10-CM

## 2023-09-29 LAB
ALBUMIN/CREAT UR: 367.2 UG/MG (ref 0–30)
CREAT UR-MCNC: 116 MG/DL (ref 23–375)
ESTIMATED AVG GLUCOSE: 137 MG/DL (ref 68–131)
HBA1C MFR BLD: 6.4 % (ref 4–5.6)
MICROALBUMIN UR DL<=1MG/L-MCNC: 426 UG/ML

## 2023-09-29 PROCEDURE — 1159F PR MEDICATION LIST DOCUMENTED IN MEDICAL RECORD: ICD-10-PCS | Mod: CPTII,S$GLB,, | Performed by: PHYSICIAN ASSISTANT

## 2023-09-29 PROCEDURE — 82570 ASSAY OF URINE CREATININE: CPT | Performed by: PHYSICIAN ASSISTANT

## 2023-09-29 PROCEDURE — 99214 PR OFFICE/OUTPT VISIT, EST, LEVL IV, 30-39 MIN: ICD-10-PCS | Mod: S$GLB,,, | Performed by: PHYSICIAN ASSISTANT

## 2023-09-29 PROCEDURE — 1101F PT FALLS ASSESS-DOCD LE1/YR: CPT | Mod: CPTII,S$GLB,, | Performed by: PHYSICIAN ASSISTANT

## 2023-09-29 PROCEDURE — 3044F PR MOST RECENT HEMOGLOBIN A1C LEVEL <7.0%: ICD-10-PCS | Mod: CPTII,S$GLB,, | Performed by: PHYSICIAN ASSISTANT

## 2023-09-29 PROCEDURE — 3288F PR FALLS RISK ASSESSMENT DOCUMENTED: ICD-10-PCS | Mod: CPTII,S$GLB,, | Performed by: PHYSICIAN ASSISTANT

## 2023-09-29 PROCEDURE — 1125F AMNT PAIN NOTED PAIN PRSNT: CPT | Mod: CPTII,S$GLB,, | Performed by: PHYSICIAN ASSISTANT

## 2023-09-29 PROCEDURE — 99999 PR PBB SHADOW E&M-EST. PATIENT-LVL V: ICD-10-PCS | Mod: PBBFAC,,, | Performed by: PHYSICIAN ASSISTANT

## 2023-09-29 PROCEDURE — 3078F DIAST BP <80 MM HG: CPT | Mod: CPTII,S$GLB,, | Performed by: PHYSICIAN ASSISTANT

## 2023-09-29 PROCEDURE — 3288F FALL RISK ASSESSMENT DOCD: CPT | Mod: CPTII,S$GLB,, | Performed by: PHYSICIAN ASSISTANT

## 2023-09-29 PROCEDURE — 3008F PR BODY MASS INDEX (BMI) DOCUMENTED: ICD-10-PCS | Mod: CPTII,S$GLB,, | Performed by: PHYSICIAN ASSISTANT

## 2023-09-29 PROCEDURE — 3074F PR MOST RECENT SYSTOLIC BLOOD PRESSURE < 130 MM HG: ICD-10-PCS | Mod: CPTII,S$GLB,, | Performed by: PHYSICIAN ASSISTANT

## 2023-09-29 PROCEDURE — 3074F SYST BP LT 130 MM HG: CPT | Mod: CPTII,S$GLB,, | Performed by: PHYSICIAN ASSISTANT

## 2023-09-29 PROCEDURE — 1159F MED LIST DOCD IN RCRD: CPT | Mod: CPTII,S$GLB,, | Performed by: PHYSICIAN ASSISTANT

## 2023-09-29 PROCEDURE — 4010F PR ACE/ARB THEARPY RXD/TAKEN: ICD-10-PCS | Mod: CPTII,S$GLB,, | Performed by: PHYSICIAN ASSISTANT

## 2023-09-29 PROCEDURE — 1125F PR PAIN SEVERITY QUANTIFIED, PAIN PRESENT: ICD-10-PCS | Mod: CPTII,S$GLB,, | Performed by: PHYSICIAN ASSISTANT

## 2023-09-29 PROCEDURE — 3044F HG A1C LEVEL LT 7.0%: CPT | Mod: CPTII,S$GLB,, | Performed by: PHYSICIAN ASSISTANT

## 2023-09-29 PROCEDURE — 1101F PR PT FALLS ASSESS DOC 0-1 FALLS W/OUT INJ PAST YR: ICD-10-PCS | Mod: CPTII,S$GLB,, | Performed by: PHYSICIAN ASSISTANT

## 2023-09-29 PROCEDURE — 99999 PR PBB SHADOW E&M-EST. PATIENT-LVL V: CPT | Mod: PBBFAC,,, | Performed by: PHYSICIAN ASSISTANT

## 2023-09-29 PROCEDURE — 99214 OFFICE O/P EST MOD 30 MIN: CPT | Mod: S$GLB,,, | Performed by: PHYSICIAN ASSISTANT

## 2023-09-29 PROCEDURE — 3078F PR MOST RECENT DIASTOLIC BLOOD PRESSURE < 80 MM HG: ICD-10-PCS | Mod: CPTII,S$GLB,, | Performed by: PHYSICIAN ASSISTANT

## 2023-09-29 PROCEDURE — 3008F BODY MASS INDEX DOCD: CPT | Mod: CPTII,S$GLB,, | Performed by: PHYSICIAN ASSISTANT

## 2023-09-29 PROCEDURE — 83036 HEMOGLOBIN GLYCOSYLATED A1C: CPT | Performed by: PHYSICIAN ASSISTANT

## 2023-09-29 PROCEDURE — 4010F ACE/ARB THERAPY RXD/TAKEN: CPT | Mod: CPTII,S$GLB,, | Performed by: PHYSICIAN ASSISTANT

## 2023-09-29 PROCEDURE — 36415 COLL VENOUS BLD VENIPUNCTURE: CPT | Performed by: PHYSICIAN ASSISTANT

## 2023-09-29 RX ORDER — AMLODIPINE BESYLATE 2.5 MG/1
2.5 TABLET ORAL DAILY
Qty: 90 TABLET | Refills: 3 | Status: SHIPPED | OUTPATIENT
Start: 2023-09-29 | End: 2024-09-28

## 2023-09-29 RX ORDER — ALLOPURINOL 100 MG/1
100 TABLET ORAL DAILY
Qty: 90 TABLET | Refills: 3 | Status: SHIPPED | OUTPATIENT
Start: 2023-09-29

## 2023-09-29 RX ORDER — INSULIN ASPART 100 [IU]/ML
INJECTION, SOLUTION INTRAVENOUS; SUBCUTANEOUS
Qty: 15 ML | Refills: 0 | Status: SHIPPED | OUTPATIENT
Start: 2023-09-29 | End: 2023-11-13

## 2023-09-29 RX ORDER — SEMAGLUTIDE 2.68 MG/ML
2 INJECTION, SOLUTION SUBCUTANEOUS
Qty: 9 ML | Refills: 3 | Status: SHIPPED | OUTPATIENT
Start: 2023-09-29

## 2023-09-29 RX ORDER — ATORVASTATIN CALCIUM 40 MG/1
40 TABLET, FILM COATED ORAL NIGHTLY
Qty: 90 TABLET | Refills: 3 | Status: SHIPPED | OUTPATIENT
Start: 2023-09-29

## 2023-09-29 NOTE — PROGRESS NOTES
Subjective:      Patient ID: Crow Green is a 68 y.o. male.    Chief Complaint: Follow-up    HPI  Here today for a routine follow up. Pt was scheduled to see me next month for a follow up. On appointment note it states that he is here for chest discomfort. Pt does not recall making this appointment.   Seeing diabetes. Has Dexcom.   BP stable and controlled.   Didn't take any of his meds today. Was in a rush to get out the door in time for transportation.    Reports shortness of breath and chest discomfort which started when he got here today.  A burning sensation in his chest. Pt reports it feels like his typical heart burn. Pain comes and goes.  Needs refills on all his medications. Pt unsure about what he is on and what he exactly needs refilled.   Scheduled for follow up with diabetes next week.     Overdue to see his specialist. Pt having trouble keeping track of his appointments.     Patient Active Problem List   Diagnosis    ED (erectile dysfunction)    Non-proliferative diabetic retinopathy, mild, both eyes    Essential hypertension    Diabetic polyneuropathy    Nonobstructive coronary artery disease of native artery of native heart    Chronic combined systolic and diastolic heart failure    Type 2 diabetes mellitus    SANDRITA (obstructive sleep apnea)    H/O Asthma    Psychophysiological insomnia    Nightmares    Sleep related gastroesophageal reflux disease    Inadequate sleep hygiene    Chest pain    PAF (paroxysmal atrial fibrillation)    At risk for medication noncompliance    Obesity (BMI 30.0-34.9)    Indeterminate stage chronic open angle glaucoma    Right hip pain    Gait instability    Chronic right shoulder pain    Arthritis    Left sided sciatica    Osteoarthritis of spine with radiculopathy, lumbar region    HNP (herniated nucleus pulposus), lumbar    Gastroesophageal reflux disease without esophagitis    Hypogonadism in male    Disorder of parathyroid gland    Hyperlipidemia associated  with type 2 diabetes mellitus    Traumatic tear of left rotator cuff    Other chronic pain    Left shoulder pain    Complete tear of left rotator cuff    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 associated with type 2 diabetes mellitus    Lumbar radiculopathy    Diabetic ulcer of toe of left foot associated with type 2 diabetes mellitus, with necrosis of muscle    Ulcer of left foot    Elevated troponin    Hypotension due to medication    Dermatomyositis    Postprocedural hypotension    Advanced directives, counseling/discussion    Schizoaffective disorder, depressive type    Chronic obstructive pulmonary disease    MDD (major depressive disorder), recurrent episode, moderate    DOMINIK (generalized anxiety disorder)    Bilateral carpal tunnel syndrome    Rotator cuff tear arthropathy of left shoulder    Sacroiliac dysfunction    Atherosclerosis of native coronary artery of native heart with unstable angina pectoris    Coronary vasospasm    Cocaine abuse    Osteopenia    Atherosclerosis of aorta    Cardiac asthma    Chronic gout of multiple sites    Acquired absence of left great toe    Acute on chronic respiratory failure with hypoxia    Hypoxia    Cardiomyopathy    Bilateral carotid artery stenosis    Substance abuse(UDS + for cocaine )    Immunocompromised patient    Other reduced mobility    Abnormality of gait and mobility    Dependence on supplemental oxygen    Positive depression screening    Pulmonary hypertension- echo 7/2022         Current Outpatient Medications:     albuterol (PROVENTIL) 2.5 mg /3 mL (0.083 %) nebulizer solution, INHALE THE CONTENTS OF 1 VIAL VIA NEBULIZER EVERY 4 TO 6 HOURS AS NEEDED FOR WHEEZING OR FOR SHORTNESS OF BREATH, Disp: 1080 mL, Rfl: 3    apixaban (ELIQUIS) 5 mg Tab, TAKE 1 TABLET TWICE DAILY, Disp: 180 tablet, Rfl: 2    aspirin (ECOTRIN) 81 MG EC tablet, Take 1 tablet (81 mg total) by mouth once daily., Disp: , Rfl:     azaTHIOprine (IMURAN)  50 mg Tab, Take 1 tablet (50 mg total) by mouth once daily., Disp: 90 tablet, Rfl: 0    blood sugar diagnostic Strp, 1 each by Misc.(Non-Drug; Combo Route) route 4 (four) times daily., Disp: 300 each, Rfl: 3    blood-glucose meter Misc, Use as instructed to test blood glucose meter daily., Disp: 1 each, Rfl: 1    brinzolamide-brimonidine (SIMBRINZA) 1-0.2 % DrpS, Place 1 drop into both eyes 3 (three) times daily., Disp: 8 mL, Rfl: 6    fluticasone-umeclidin-vilanter (TRELEGY ELLIPTA) 200-62.5-25 mcg inhaler, Inhale 1 puff into the lungs once daily., Disp: 360 each, Rfl: 5    furosemide (LASIX) 40 MG tablet, Take 2 tablets (80 mg total) by mouth 2 (two) times a day., Disp: 120 tablet, Rfl: 10    gabapentin (NEURONTIN) 800 MG tablet, TAKE 1 TABLET(800 MG) BY MOUTH THREE TIMES DAILY, Disp: 270 tablet, Rfl: 4    HYDROcodone-acetaminophen (NORCO)  mg per tablet, Take 1 tablet by mouth every 4 (four) hours as needed for Pain., Disp: 12 tablet, Rfl: 0    lancets (TRUEPLUS LANCETS) 33 gauge Misc, Use to check blood sugar twice a day, Disp: 100 each, Rfl: 6    latanoprost 0.005 % ophthalmic solution, INSTILL 1 DROP INTO BOTH EYES ONE TIME DAILY BOTTLE EXPIRES 42 DAYS AFTER OPENING..., Disp: 7.5 mL, Rfl: 3    lisinopriL (PRINIVIL,ZESTRIL) 30 MG tablet, TAKE 1 TABLET EVERY DAY, Disp: 90 tablet, Rfl: 2    lisinopriL 10 MG tablet, TAKE 1 TABLET EVERY DAY, Disp: 90 tablet, Rfl: 2    naproxen (NAPROSYN) 500 MG tablet, Take 1 tablet (500 mg total) by mouth 2 (two) times daily with meals., Disp: 20 tablet, Rfl: 0    omalizumab (XOLAIR) 150 mg/mL injection, Inject 2 mLs (300 mg total) into the skin every 14 (fourteen) days., Disp: 4 each, Rfl: 11    omalizumab (XOLAIR) 75 mg/0.5 mL injection, Inject 0.5 mLs (75 mg total) into the skin every 14 (fourteen) days., Disp: 2 each, Rfl: 11    omeprazole (PRILOSEC) 20 MG capsule, Take 20 mg by mouth every morning., Disp: , Rfl:     pantoprazole (PROTONIX) 40 MG tablet, Take 1 tablet  "(40 mg total) by mouth once daily., Disp: 90 tablet, Rfl: 1    papaverine-phentolamin-alprost 150 mg-5 mg- 50 mcg SolR, 0.5 mLs by Intracavernosal route as needed (ED)., Disp: 10 each, Rfl: ml    pen needle, diabetic (BD ULTRA-FINE SHORT PEN NEEDLE) 31 gauge x 5/16" Ndle, 1 each by Misc.(Non-Drug; Combo Route) route 3 (three) times daily., Disp: 300 each, Rfl: 3    predniSONE (DELTASONE) 50 MG Tab, Take 50 mg by mouth., Disp: , Rfl:     ranolazine (RANEXA) 1,000 mg Tb12, TAKE 1 TABLET TWICE DAILY, Disp: 180 tablet, Rfl: 2    sertraline (ZOLOFT) 100 MG tablet, Take 1 tablet (100 mg total) by mouth every evening., Disp: 90 tablet, Rfl: 4    traMADoL (ULTRAM) 50 mg tablet, Take 50 mg by mouth., Disp: , Rfl:     traZODone (DESYREL) 100 MG tablet, TAKE 2 TABLETS EVERY EVENING, Disp: 180 tablet, Rfl: 4    VIOS AEROSOL DELIVERY SYSTEM Shefali, USE AS DIRESTED, Disp: , Rfl: 0    allopurinoL (ZYLOPRIM) 100 MG tablet, Take 1 tablet (100 mg total) by mouth once daily., Disp: 90 tablet, Rfl: 3    amLODIPine (NORVASC) 2.5 MG tablet, Take 1 tablet (2.5 mg total) by mouth once daily., Disp: 90 tablet, Rfl: 3    atorvastatin (LIPITOR) 40 MG tablet, Take 1 tablet (40 mg total) by mouth every evening., Disp: 90 tablet, Rfl: 3    empagliflozin (JARDIANCE) 25 mg tablet, Take 1 tablet (25 mg total) by mouth once daily., Disp: 90 tablet, Rfl: 3    EPINEPHrine (EPIPEN) 0.3 mg/0.3 mL AtIn, Inject 0.3 mLs (0.3 mg total) into the muscle once. for 1 dose, Disp: 2 each, Rfl: 3    insulin aspart U-100 (NOVOLOG FLEXPEN U-100 INSULIN) 100 unit/mL (3 mL) InPn pen, INJECT 10 UNITS SUBCUTANEOUSLY THREE TIMES DAILY WITH MEALS, Disp: 15 mL, Rfl: 0    semaglutide (OZEMPIC) 2 mg/dose (8 mg/3 mL) PnIj, Inject 2 mg into the skin every 7 days., Disp: 9 mL, Rfl: 3    tamsulosin (FLOMAX) 0.4 mg Cap, Take 1 capsule (0.4 mg total) by mouth once daily., Disp: 90 capsule, Rfl: 1    Review of Systems   Constitutional:  Negative for activity change, appetite change, " "chills, diaphoresis, fatigue, fever and unexpected weight change.   HENT: Negative.  Negative for congestion, hearing loss, postnasal drip, rhinorrhea, sore throat, trouble swallowing and voice change.    Eyes: Negative.  Negative for visual disturbance.   Respiratory:  Positive for chest tightness, shortness of breath and wheezing. Negative for cough, choking and stridor.    Cardiovascular:  Negative for chest pain, palpitations and leg swelling.   Gastrointestinal:  Negative for abdominal distention, abdominal pain, blood in stool, constipation, diarrhea, nausea and vomiting.   Endocrine: Negative for cold intolerance, heat intolerance, polydipsia and polyuria.   Genitourinary: Negative.  Negative for difficulty urinating and frequency.   Musculoskeletal:  Positive for arthralgias and back pain. Negative for gait problem, joint swelling and myalgias.   Skin:  Negative for color change, pallor, rash and wound.   Neurological:  Negative for dizziness, tremors, weakness, light-headedness, numbness and headaches.   Hematological:  Negative for adenopathy.   Psychiatric/Behavioral:  Negative for behavioral problems, confusion, self-injury, sleep disturbance and suicidal ideas. The patient is not nervous/anxious.      Objective:   /70 (BP Location: Left arm, Patient Position: Sitting, BP Method: Large (Manual))   Pulse 76   Temp 97 °F (36.1 °C) (Tympanic)   Resp 12   Ht 5' 5" (1.651 m)   Wt 90.1 kg (198 lb 10.2 oz)   SpO2 97%   BMI 33.05 kg/m²     Physical Exam  Vitals reviewed.   Constitutional:       General: He is not in acute distress.     Appearance: Normal appearance. He is well-developed. He is not ill-appearing, toxic-appearing or diaphoretic.   HENT:      Head: Normocephalic and atraumatic.      Right Ear: External ear normal.      Left Ear: External ear normal.      Nose: Nose normal.   Eyes:      Conjunctiva/sclera: Conjunctivae normal.      Pupils: Pupils are equal, round, and reactive to light. "   Cardiovascular:      Rate and Rhythm: Normal rate and regular rhythm.      Heart sounds: Normal heart sounds. No murmur heard.     No friction rub. No gallop.   Pulmonary:      Effort: Pulmonary effort is normal. No respiratory distress.      Breath sounds: No stridor. Wheezing present. No rhonchi or rales.   Chest:      Chest wall: No tenderness.   Abdominal:      General: There is no distension.      Palpations: Abdomen is soft.      Tenderness: There is no abdominal tenderness.   Musculoskeletal:         General: Normal range of motion.      Cervical back: Normal range of motion and neck supple.   Lymphadenopathy:      Cervical: No cervical adenopathy.   Skin:     General: Skin is warm and dry.      Capillary Refill: Capillary refill takes less than 2 seconds.      Findings: No rash.   Neurological:      Mental Status: He is alert and oriented to person, place, and time.      Motor: No weakness.      Coordination: Coordination normal.      Gait: Gait normal.   Psychiatric:         Mood and Affect: Mood normal.         Behavior: Behavior normal.         Thought Content: Thought content normal.         Judgment: Judgment normal.         Assessment:     1. 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 Osteoarthritis of spine with radiculopathy, lumbar region    3. Sacroiliac dysfunction    4. Erectile dysfunction, unspecified erectile dysfunction type    5. Atherosclerosis of native coronary artery of native heart with unstable angina pectoris    6. Nonobstructive coronary artery disease of native artery of native heart    7. PAF (paroxysmal atrial fibrillation)    8. Mild nonproliferative diabetic retinopathy of both eyes associated with type 2 diabetes mellitus, macular edema presence unspecified    9. 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 associated with type 2  diabetes mellitus    10. Chest pain, unspecified type    11. Dermatomyositis    12. Uncontrolled type 2 diabetes mellitus with hyperglycemia    13. Chronic gout of multiple sites, unspecified cause    14. Hyperlipidemia associated with type 2 diabetes mellitus    15. Type 2 diabetes mellitus with mild nonproliferative retinopathy, with long-term current use of insulin, macular edema presence unspecified, unspecified laterality    16. At risk for noncompliance      Plan:   Type 2 diabetes mellitus with mild nonproliferative retinopathy and macular edema, with long-term current use of insulin, unspecified laterality  -     Microalbumin/Creatinine Ratio, Urine; Future; Expected date: 09/29/2023  -     semaglutide (OZEMPIC) 2 mg/dose (8 mg/3 mL) PnIj; Inject 2 mg into the skin every 7 days.  Dispense: 9 mL; Refill: 3  -     Microalbumin/creatinine urine ratio    Osteoarthritis of spine with radiculopathy, lumbar region  -     Ambulatory referral/consult to Pain Clinic; Future; Expected date: 10/06/2023    Sacroiliac dysfunction  -     Ambulatory referral/consult to Pain Clinic; Future; Expected date: 10/06/2023    Erectile dysfunction, unspecified erectile dysfunction type  -     Ambulatory referral/consult to Urology; Future; Expected date: 10/06/2023    Atherosclerosis of native coronary artery of native heart with unstable angina pectoris  -     Ambulatory referral/consult to Cardiology    Nonobstructive coronary artery disease of native artery of native heart  -     Ambulatory referral/consult to Cardiology    PAF (paroxysmal atrial fibrillation)  -     Ambulatory referral/consult to Cardiology    Mild nonproliferative diabetic retinopathy of both eyes associated with type 2 diabetes mellitus, macular edema presence unspecified  -     Ambulatory referral/consult to Ophthalmology; Future; Expected date: 10/06/2023    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 associated with type 2  diabetes mellitus  -     Ambulatory referral/consult to Ophthalmology; Future; Expected date: 10/06/2023    Chest pain, unspecified type  -     EKG 12-lead; Future  -     X-Ray Chest PA And Lateral; Future; Expected date: 09/29/2023    Dermatomyositis  -     Ambulatory referral/consult to Rheumatology; Future; Expected date: 10/06/2023    Uncontrolled type 2 diabetes mellitus with hyperglycemia  -     semaglutide (OZEMPIC) 2 mg/dose (8 mg/3 mL) PnIj; Inject 2 mg into the skin every 7 days.  Dispense: 9 mL; Refill: 3  -     empagliflozin (JARDIANCE) 25 mg tablet; Take 1 tablet (25 mg total) by mouth once daily.  Dispense: 90 tablet; Refill: 3    Chronic gout of multiple sites, unspecified cause  -     allopurinoL (ZYLOPRIM) 100 MG tablet; Take 1 tablet (100 mg total) by mouth once daily.  Dispense: 90 tablet; Refill: 3    Hyperlipidemia associated with type 2 diabetes mellitus  -     atorvastatin (LIPITOR) 40 MG tablet; Take 1 tablet (40 mg total) by mouth every evening.  Dispense: 90 tablet; Refill: 3    Type 2 diabetes mellitus with mild nonproliferative retinopathy, with long-term current use of insulin, macular edema presence unspecified, unspecified laterality  -     insulin aspart U-100 (NOVOLOG FLEXPEN U-100 INSULIN) 100 unit/mL (3 mL) InPn pen; INJECT 10 UNITS SUBCUTANEOUSLY THREE TIMES DAILY WITH MEALS  Dispense: 15 mL; Refill: 0    At risk for noncompliance  -     Ambulatory referral/consult to Outpatient Case Management    Other orders  -     amLODIPine (NORVASC) 2.5 MG tablet; Take 1 tablet (2.5 mg total) by mouth once daily.  Dispense: 90 tablet; Refill: 3      -BRING IN ALL MEDICATIONS TO FOLLOW UP APPOINTMENT IN 1 MONTH  -OVERDUE FOR FOLLOW UP WITH CARDIOLOGY, UROLOGY, OPHTHLAMOLOGY.   -pt was referred to rheum by his pulmonologist for follow up but apt was never scheduled.   -ER if symptoms worsen (shortness of breath/chest pain)    Follow up in about 4 weeks (around 10/27/2023), or if symptoms worsen  or fail to improve.

## 2023-10-02 ENCOUNTER — TELEPHONE (OUTPATIENT)
Dept: PAIN MEDICINE | Facility: CLINIC | Age: 68
End: 2023-10-02
Payer: MEDICARE

## 2023-10-03 ENCOUNTER — TELEPHONE (OUTPATIENT)
Dept: PAIN MEDICINE | Facility: CLINIC | Age: 68
End: 2023-10-03
Payer: MEDICARE

## 2023-10-04 ENCOUNTER — PATIENT OUTREACH (OUTPATIENT)
Dept: ADMINISTRATIVE | Facility: OTHER | Age: 68
End: 2023-10-04
Payer: MEDICARE

## 2023-10-04 ENCOUNTER — TELEPHONE (OUTPATIENT)
Dept: PAIN MEDICINE | Facility: CLINIC | Age: 68
End: 2023-10-04
Payer: MEDICARE

## 2023-10-04 NOTE — PROGRESS NOTES
CHW - Case Closure    This Community Health Worker spoke to patient via telephone today.     Pt/Caregiver reported: pt declined needing community resources this time    CHW did ask about transportation (per chart high risk) pt stated he doesn't need resources for that    Pt/Caregiver denied any additional needs at this time and agrees with episode closure at this time.  Provided patient with Community Health Worker's contact information and encouraged him/her to contact this Community Health Worker if additional needs arise.

## 2023-10-06 ENCOUNTER — OFFICE VISIT (OUTPATIENT)
Dept: DIABETES | Facility: CLINIC | Age: 68
End: 2023-10-06
Payer: MEDICARE

## 2023-10-06 ENCOUNTER — HOSPITAL ENCOUNTER (OUTPATIENT)
Dept: RADIOLOGY | Facility: HOSPITAL | Age: 68
Discharge: HOME | End: 2023-10-06
Attending: PHYSICIAN ASSISTANT
Payer: MEDICARE

## 2023-10-06 ENCOUNTER — HOSPITAL ENCOUNTER (OUTPATIENT)
Dept: CARDIOLOGY | Facility: HOSPITAL | Age: 68
Discharge: HOME | End: 2023-10-06
Payer: MEDICARE

## 2023-10-06 VITALS
DIASTOLIC BLOOD PRESSURE: 77 MMHG | HEIGHT: 65 IN | WEIGHT: 196.44 LBS | BODY MASS INDEX: 32.73 KG/M2 | SYSTOLIC BLOOD PRESSURE: 127 MMHG | HEART RATE: 78 BPM

## 2023-10-06 DIAGNOSIS — E11.59 HYPERTENSION ASSOCIATED WITH DIABETES: ICD-10-CM

## 2023-10-06 DIAGNOSIS — E11.22 CKD STAGE 3 DUE TO TYPE 2 DIABETES MELLITUS: ICD-10-CM

## 2023-10-06 DIAGNOSIS — E66.9 OBESITY (BMI 30.0-34.9): ICD-10-CM

## 2023-10-06 DIAGNOSIS — I15.2 HYPERTENSION ASSOCIATED WITH DIABETES: ICD-10-CM

## 2023-10-06 DIAGNOSIS — I25.118 CORONARY ARTERY DISEASE OF NATIVE ARTERY OF NATIVE HEART WITH STABLE ANGINA PECTORIS: ICD-10-CM

## 2023-10-06 DIAGNOSIS — I50.32 CHRONIC DIASTOLIC HEART FAILURE: ICD-10-CM

## 2023-10-06 DIAGNOSIS — F25.1 SCHIZOAFFECTIVE DISORDER, DEPRESSIVE TYPE: ICD-10-CM

## 2023-10-06 DIAGNOSIS — E78.5 HYPERLIPIDEMIA ASSOCIATED WITH TYPE 2 DIABETES MELLITUS: ICD-10-CM

## 2023-10-06 DIAGNOSIS — R07.9 CHEST PAIN, UNSPECIFIED TYPE: ICD-10-CM

## 2023-10-06 DIAGNOSIS — E11.69 HYPERLIPIDEMIA ASSOCIATED WITH TYPE 2 DIABETES MELLITUS: ICD-10-CM

## 2023-10-06 DIAGNOSIS — E11.3312 MODERATE NONPROLIFERATIVE DIABETIC RETINOPATHY OF LEFT EYE WITH MACULAR EDEMA ASSOCIATED WITH TYPE 2 DIABETES MELLITUS: ICD-10-CM

## 2023-10-06 DIAGNOSIS — E21.5 DISORDER OF PARATHYROID GLAND: ICD-10-CM

## 2023-10-06 DIAGNOSIS — E11.65 UNCONTROLLED TYPE 2 DIABETES MELLITUS WITH HYPERGLYCEMIA: Primary | ICD-10-CM

## 2023-10-06 DIAGNOSIS — N18.30 CKD STAGE 3 DUE TO TYPE 2 DIABETES MELLITUS: ICD-10-CM

## 2023-10-06 DIAGNOSIS — E11.42 DIABETIC POLYNEUROPATHY ASSOCIATED WITH TYPE 2 DIABETES MELLITUS: ICD-10-CM

## 2023-10-06 LAB — GLUCOSE SERPL-MCNC: 148 MG/DL (ref 70–110)

## 2023-10-06 PROCEDURE — 99999 PR PBB SHADOW E&M-EST. PATIENT-LVL V: ICD-10-PCS | Mod: PBBFAC,,, | Performed by: PHYSICIAN ASSISTANT

## 2023-10-06 PROCEDURE — 99999 PR PBB SHADOW E&M-EST. PATIENT-LVL V: CPT | Mod: PBBFAC,,, | Performed by: PHYSICIAN ASSISTANT

## 2023-10-06 PROCEDURE — 1125F AMNT PAIN NOTED PAIN PRSNT: CPT | Mod: CPTII,S$GLB,, | Performed by: PHYSICIAN ASSISTANT

## 2023-10-06 PROCEDURE — 3008F PR BODY MASS INDEX (BMI) DOCUMENTED: ICD-10-PCS | Mod: CPTII,S$GLB,, | Performed by: PHYSICIAN ASSISTANT

## 2023-10-06 PROCEDURE — 93005 ELECTROCARDIOGRAM TRACING: CPT

## 2023-10-06 PROCEDURE — 93010 EKG 12-LEAD: ICD-10-PCS | Mod: ,,, | Performed by: INTERNAL MEDICINE

## 2023-10-06 PROCEDURE — 1159F MED LIST DOCD IN RCRD: CPT | Mod: CPTII,S$GLB,, | Performed by: PHYSICIAN ASSISTANT

## 2023-10-06 PROCEDURE — 82962 POCT GLUCOSE, HAND-HELD DEVICE: ICD-10-PCS | Mod: S$GLB,,, | Performed by: PHYSICIAN ASSISTANT

## 2023-10-06 PROCEDURE — 4010F ACE/ARB THERAPY RXD/TAKEN: CPT | Mod: CPTII,S$GLB,, | Performed by: PHYSICIAN ASSISTANT

## 2023-10-06 PROCEDURE — 1125F PR PAIN SEVERITY QUANTIFIED, PAIN PRESENT: ICD-10-PCS | Mod: CPTII,S$GLB,, | Performed by: PHYSICIAN ASSISTANT

## 2023-10-06 PROCEDURE — 3044F HG A1C LEVEL LT 7.0%: CPT | Mod: CPTII,S$GLB,, | Performed by: PHYSICIAN ASSISTANT

## 2023-10-06 PROCEDURE — 82962 GLUCOSE BLOOD TEST: CPT | Mod: S$GLB,,, | Performed by: PHYSICIAN ASSISTANT

## 2023-10-06 PROCEDURE — 1101F PT FALLS ASSESS-DOCD LE1/YR: CPT | Mod: CPTII,S$GLB,, | Performed by: PHYSICIAN ASSISTANT

## 2023-10-06 PROCEDURE — 4010F PR ACE/ARB THEARPY RXD/TAKEN: ICD-10-PCS | Mod: CPTII,S$GLB,, | Performed by: PHYSICIAN ASSISTANT

## 2023-10-06 PROCEDURE — 3062F POS MACROALBUMINURIA REV: CPT | Mod: CPTII,S$GLB,, | Performed by: PHYSICIAN ASSISTANT

## 2023-10-06 PROCEDURE — 3008F BODY MASS INDEX DOCD: CPT | Mod: CPTII,S$GLB,, | Performed by: PHYSICIAN ASSISTANT

## 2023-10-06 PROCEDURE — 3066F PR DOCUMENTATION OF TREATMENT FOR NEPHROPATHY: ICD-10-PCS | Mod: CPTII,S$GLB,, | Performed by: PHYSICIAN ASSISTANT

## 2023-10-06 PROCEDURE — 3066F NEPHROPATHY DOC TX: CPT | Mod: CPTII,S$GLB,, | Performed by: PHYSICIAN ASSISTANT

## 2023-10-06 PROCEDURE — 3044F PR MOST RECENT HEMOGLOBIN A1C LEVEL <7.0%: ICD-10-PCS | Mod: CPTII,S$GLB,, | Performed by: PHYSICIAN ASSISTANT

## 2023-10-06 PROCEDURE — 1159F PR MEDICATION LIST DOCUMENTED IN MEDICAL RECORD: ICD-10-PCS | Mod: CPTII,S$GLB,, | Performed by: PHYSICIAN ASSISTANT

## 2023-10-06 PROCEDURE — 71046 X-RAY EXAM CHEST 2 VIEWS: CPT | Mod: TC

## 2023-10-06 PROCEDURE — 3074F PR MOST RECENT SYSTOLIC BLOOD PRESSURE < 130 MM HG: ICD-10-PCS | Mod: CPTII,S$GLB,, | Performed by: PHYSICIAN ASSISTANT

## 2023-10-06 PROCEDURE — 3078F DIAST BP <80 MM HG: CPT | Mod: CPTII,S$GLB,, | Performed by: PHYSICIAN ASSISTANT

## 2023-10-06 PROCEDURE — 3078F PR MOST RECENT DIASTOLIC BLOOD PRESSURE < 80 MM HG: ICD-10-PCS | Mod: CPTII,S$GLB,, | Performed by: PHYSICIAN ASSISTANT

## 2023-10-06 PROCEDURE — 71046 XR CHEST PA AND LATERAL: ICD-10-PCS | Mod: 26,,, | Performed by: RADIOLOGY

## 2023-10-06 PROCEDURE — 71046 X-RAY EXAM CHEST 2 VIEWS: CPT | Mod: 26,,, | Performed by: RADIOLOGY

## 2023-10-06 PROCEDURE — 99214 PR OFFICE/OUTPT VISIT, EST, LEVL IV, 30-39 MIN: ICD-10-PCS | Mod: S$GLB,,, | Performed by: PHYSICIAN ASSISTANT

## 2023-10-06 PROCEDURE — 99214 OFFICE O/P EST MOD 30 MIN: CPT | Mod: S$GLB,,, | Performed by: PHYSICIAN ASSISTANT

## 2023-10-06 PROCEDURE — 93010 ELECTROCARDIOGRAM REPORT: CPT | Mod: ,,, | Performed by: INTERNAL MEDICINE

## 2023-10-06 PROCEDURE — 1101F PR PT FALLS ASSESS DOC 0-1 FALLS W/OUT INJ PAST YR: ICD-10-PCS | Mod: CPTII,S$GLB,, | Performed by: PHYSICIAN ASSISTANT

## 2023-10-06 PROCEDURE — 3288F FALL RISK ASSESSMENT DOCD: CPT | Mod: CPTII,S$GLB,, | Performed by: PHYSICIAN ASSISTANT

## 2023-10-06 PROCEDURE — 3062F PR POS MACROALBUMINURIA RESULT DOCUMENTED/REVIEW: ICD-10-PCS | Mod: CPTII,S$GLB,, | Performed by: PHYSICIAN ASSISTANT

## 2023-10-06 PROCEDURE — 3074F SYST BP LT 130 MM HG: CPT | Mod: CPTII,S$GLB,, | Performed by: PHYSICIAN ASSISTANT

## 2023-10-06 PROCEDURE — 3288F PR FALLS RISK ASSESSMENT DOCUMENTED: ICD-10-PCS | Mod: CPTII,S$GLB,, | Performed by: PHYSICIAN ASSISTANT

## 2023-10-06 NOTE — PROGRESS NOTES
PCP: Cm Polanco MD    Subjective:     Chief Complaint: Diabetes - Established Patient    HISTORY OF PRESENT ILLNESS: 68 y.o.   male presenting for diabetes management visit.   The patient's last visit with me was on 7/14/2023.  Patient has issues with transportation.   Patient has had Type II diabetes since 2000.  Pertinent to decision making is the following comorbidities: HTN, HLD, CAD, CHF, CKD III, Obesity by BMI, ED and Parathryoid disorder and Hypogonadism   Patient has the following Diabetes complications: with diabetic chronic kidney disease, with diabetic polyneuropathy and with diabetic retinopathy; mild non-proliferative; without macular edema  He has attended diabetes education in the past.     Patient's most recent A1c of 6.4% was completed 2 weeks ago.   Patient states since His last A1c His blood glucose levels have been within range throughout the day .   Patient monitors blood glucose 4 times per day and Continuously with personal CGM Dexcom.    Patient blood glucose monitoring device will not be uploaded into [employer] today. Unable to download    Patient reports blood sugars in range most of the time with occasional hypoglycemia related to patient avoiding intake of food.  Patient endorses the following diabetes related symptoms:  None .   Patient is due today for the following diabetes-related health maintenance standards: COVID-19 Vaccine .   He denies recent hospital admissions or emergency room visits.  He voices having hypoglycemia in the am when skipping meal.   Patient's concerns today include glycemic control.   Patient medication regimen is as below.     CURRENT DM MEDICATIONS:   Novolog correction dosing every 3 hours   Jardiance 25 mg daily  Ozempic 2 mg weekly    Novolog Correction Dosing every 3 hours as needed OUTSIDE OF EATING  If  - 250, may take 2 units of Novolog  If  - 300, may take 3 units of Novolog  If  - 350, may take 4 units of  Novolog  If  - 400, may take 5 units of Novolog  If +, may take 6 units of Novolog      Patient has failed the following Diabetes medications:   Bydureon  Metformin - GI  Glimepiride      Labs Reviewed.       Lab Results   Component Value Date    CPEPTIDE 4.37 07/20/2017     Lab Results   Component Value Date    GLUTAMICACID 0.01 07/20/2017          //   , There is no height or weight on file to calculate BMI.  His blood sugar in clinic today is:        Review of Systems   Constitutional:  Negative for activity change, appetite change, chills and fever.   HENT:  Negative for dental problem, mouth sores, nosebleeds, sore throat and trouble swallowing.    Eyes:  Negative for pain and discharge.   Respiratory:  Negative for shortness of breath, wheezing and stridor.    Cardiovascular:  Negative for chest pain, palpitations and leg swelling.   Gastrointestinal:  Negative for abdominal pain, diarrhea, nausea and vomiting.   Endocrine: Negative for polydipsia, polyphagia and polyuria.   Genitourinary:  Negative for dysuria, frequency and urgency.   Musculoskeletal:  Negative for joint swelling and myalgias.   Skin:  Negative for rash and wound.   Neurological:  Negative for dizziness, syncope, weakness and headaches.   Psychiatric/Behavioral:  Negative for behavioral problems and dysphoric mood.          Diabetes Management Status  Statin: Taking  ACE/ARB: Taking    Screening or Prevention Patient's value Goal Complete/Controlled?   HgA1C Testing and Control   Lab Results   Component Value Date    HGBA1C 6.4 (H) 09/29/2023      Annually/Less than 8% Yes   Lipid profile : 07/08/2023 Annually Yes   LDL control Lab Results   Component Value Date    LDLCALC 62 07/08/2023    Annually/Less than 100 mg/dl  Yes   Nephropathy screening Lab Results   Component Value Date    MICALBCREAT 367.2 (H) 09/29/2023     Lab Results   Component Value Date    PROTEINUA 2+ (A) 03/31/2023    Annually No   Blood pressure BP Readings  from Last 1 Encounters:   09/29/23 116/70    Less than 140/90 Yes   Dilated retinal exam : 01/18/2023 Annually Yes    Foot exam   : 04/11/2023 Annually Yes     Social History     Socioeconomic History    Marital status: Legally     Number of children: 5   Occupational History    Occupation: disabled    Occupation: PT at [employer]    Tobacco Use    Smoking status: Never    Smokeless tobacco: Never   Substance and Sexual Activity    Alcohol use: Yes     Comment: rare, maybe holidays    Drug use: Not Currently     Types: Cocaine    Sexual activity: Not Currently     Partners: Female   Social History Narrative    Utilizing [employer] transportation which has been unreliable.      Social Determinants of Health     Financial Resource Strain: Low Risk  (12/7/2022)    Overall Financial Resource Strain (CARDIA)     Difficulty of Paying Living Expenses: Not very hard   Food Insecurity: No Food Insecurity (12/7/2022)    Hunger Vital Sign     Worried About Running Out of Food in the Last Year: Never true     Ran Out of Food in the Last Year: Never true   Transportation Needs: Unmet Transportation Needs (12/7/2022)    PRAPARE - Transportation     Lack of Transportation (Medical): Yes     Lack of Transportation (Non-Medical): Yes   Physical Activity: Inactive (12/7/2022)    Exercise Vital Sign     Days of Exercise per Week: 0 days     Minutes of Exercise per Session: 0 min   Stress: Stress Concern Present (12/7/2022)    [place] [place] [employer] - Occupational Stress Questionnaire     Feeling of Stress : Rather much   Social Connections: Moderately Isolated (12/7/2022)    Social Connection and Isolation Panel [NHANES]     Frequency of Communication with Friends and Family: Once a week     Frequency of Social Gatherings with Friends and Family: Once a week     Attends Islam Services: More than 4 times per year     Active Member of Clubs or Organizations: No     Attends Club or Organization  Meetings: More than 4 times per year     Marital Status:    Housing Stability: Low Risk  (12/7/2022)    Housing Stability Vital Sign     Unable to Pay for Housing in the Last Year: No     Number of Places Lived in the Last Year: 1     Unstable Housing in the Last Year: No     Past Medical History:   Diagnosis Date    Arthritis     Asthma     Atrial fibrillation     Atrial fibrillation with RVR 08/07/2019    Carotid artery stenosis     CHF (congestive heart failure)     Chronic obstructive pulmonary disease 08/05/2020    Depression     Dermatomyositis     Diabetes mellitus, type 2 Diagnosed in 2000    Elevated PSA     Follow up 07/30/2020    Hypertension     Myocardial infarction     2017    Sleep apnea        Objective:        Physical Exam  Constitutional:       General: He is not in acute distress.     Appearance: He is well-developed. He is not diaphoretic.   HENT:      Head: Normocephalic and atraumatic.      Right Ear: External ear normal.      Left Ear: External ear normal.      Nose: Nose normal.   Eyes:      General:         Right eye: No discharge.         Left eye: No discharge.      Pupils: Pupils are equal, round, and reactive to light.   Cardiovascular:      Rate and Rhythm: Normal rate and regular rhythm.      Heart sounds: Normal heart sounds.   Pulmonary:      Effort: Pulmonary effort is normal.      Breath sounds: Normal breath sounds.   Abdominal:      Palpations: Abdomen is soft.   Musculoskeletal:         General: Normal range of motion.      Cervical back: Normal range of motion and neck supple.   Skin:     General: Skin is warm and dry.      Capillary Refill: Capillary refill takes less than 2 seconds.   Neurological:      Mental Status: He is alert and oriented to person, place, and time. Mental status is at baseline.      Motor: No abnormal muscle tone.      Coordination: Coordination normal.   Psychiatric:         Mood and Affect: Mood normal.         Behavior: Behavior normal.          Thought Content: Thought content normal.         Judgment: Judgment normal.         Assessment / Plan:     Uncontrolled type 2 diabetes mellitus with hyperglycemia  -     POCT Glucose, Hand-Held Device  -     Comprehensive Metabolic Panel; Future; Expected date: 10/06/2023  -     Ambulatory referral/consult to Diabetes Education; Future; Expected date: 10/13/2023    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 associated with type 2 diabetes mellitus    Diabetic polyneuropathy associated with type 2 diabetes mellitus    Chronic diastolic heart failure    Coronary artery disease of native artery of native heart with stable angina pectoris    Hypertension associated with diabetes    Hyperlipidemia associated with type 2 diabetes mellitus    Disorder of parathyroid gland    Obesity (BMI 30.0-34.9)    CKD stage 3 due to type 2 diabetes mellitus  -     Comprehensive Metabolic Panel; Future; Expected date: 10/06/2023    Schizoaffective disorder, depressive type  -     Ambulatory referral/consult to Psychiatry; Future; Expected date: 10/15/2023        Additional Plan Details:    - POCT Glucose  - Encouraged continuation of lifestyle changes including regular exercise and limiting carbohydrates to 30-45 grams per meal threes times daily and 15 grams per snack with a limit of two daily.   - Encouraged continued monitoring of blood glucose with maintenance of 4 times daily and Continuously with personal CGM Dexcom. Dex G7 to [facility].    - Current DM Medication Regimen: Continue Novolog correction dosing every 3 hours as below.  Continue Ozempic 2 mg weekly. Continue Jardiance 25 mg daily.    - Positive Depression Screening: will place new referral to behavioral health for management   - Referral to CDE: nutrition focus; frequent hypoglycemia   - CMP today  - Health Maintenance standards addressed today: COVID - 19 Vaccine - patient will schedule outside of [facility]   - Nursing Visit: Patient is  below goal range for nursing visit for age group and will not need nursing visit at this time .   - Follow up in 6 months with A1c prior.    CURRENT DM MEDICATIONS:   Novolog correction dosing every 3 hours   Jardiance 25 mg daily  Ozempic 2 mg weekly    Novolog Correction Dosing every 3 hours as needed OUTSIDE OF EATING  If  - 250, may take 2 units of Novolog  If  - 300, may take 3 units of Novolog  If  - 350, may take 4 units of Novolog  If  - 400, may take 5 units of Novolog  If +, may take 6 units of Novolog    Blakeney McKnight, PA-C [facility]     A total of 30 minutes was spent in face to face time, of which over 50 % was spent in counseling patient on disease process, complications, treatment, and side effects of medications.

## 2023-10-06 NOTE — PATIENT INSTRUCTIONS
CURRENT DM MEDICATIONS:   Novolog correction dosing every 3 hours   Jardiance 25 mg daily  Ozempic 2 mg weekly    Novolog Correction Dosing every 3 hours as needed OUTSIDE OF EATING  If  - 250, may take 2 units of Novolog  If  - 300, may take 3 units of Novolog  If  - 350, may take 4 units of Novolog  If  - 400, may take 5 units of Novolog  If +, may take 6 units of Novolog      CALL ME WHEN YOU GET NEW DEXCOM SO WE CAN TRAIN YOU

## 2023-10-11 ENCOUNTER — TELEPHONE (OUTPATIENT)
Dept: INTERNAL MEDICINE | Facility: CLINIC | Age: 68
End: 2023-10-11
Payer: MEDICARE

## 2023-10-13 ENCOUNTER — PATIENT MESSAGE (OUTPATIENT)
Dept: PSYCHIATRY | Facility: CLINIC | Age: 68
End: 2023-10-13
Payer: MEDICARE

## 2023-10-17 ENCOUNTER — OFFICE VISIT (OUTPATIENT)
Dept: CARDIOLOGY | Facility: CLINIC | Age: 68
End: 2023-10-17
Payer: MEDICARE

## 2023-10-17 VITALS
HEART RATE: 86 BPM | HEIGHT: 65 IN | WEIGHT: 200.38 LBS | DIASTOLIC BLOOD PRESSURE: 70 MMHG | BODY MASS INDEX: 33.38 KG/M2 | SYSTOLIC BLOOD PRESSURE: 132 MMHG | OXYGEN SATURATION: 96 %

## 2023-10-17 DIAGNOSIS — Z79.4 TYPE 2 DIABETES MELLITUS WITH MILD NONPROLIFERATIVE RETINOPATHY AND MACULAR EDEMA, WITH LONG-TERM CURRENT USE OF INSULIN, UNSPECIFIED LATERALITY: ICD-10-CM

## 2023-10-17 DIAGNOSIS — I48.0 PAF (PAROXYSMAL ATRIAL FIBRILLATION): Chronic | ICD-10-CM

## 2023-10-17 DIAGNOSIS — I25.110 ATHEROSCLEROSIS OF NATIVE CORONARY ARTERY OF NATIVE HEART WITH UNSTABLE ANGINA PECTORIS: ICD-10-CM

## 2023-10-17 DIAGNOSIS — I50.42 CHRONIC COMBINED SYSTOLIC AND DIASTOLIC HEART FAILURE: ICD-10-CM

## 2023-10-17 DIAGNOSIS — E11.3219 TYPE 2 DIABETES MELLITUS WITH MILD NONPROLIFERATIVE RETINOPATHY AND MACULAR EDEMA, WITH LONG-TERM CURRENT USE OF INSULIN, UNSPECIFIED LATERALITY: ICD-10-CM

## 2023-10-17 DIAGNOSIS — I25.118 CORONARY ARTERY DISEASE OF NATIVE ARTERY OF NATIVE HEART WITH STABLE ANGINA PECTORIS: Chronic | ICD-10-CM

## 2023-10-17 DIAGNOSIS — I65.23 BILATERAL CAROTID ARTERY STENOSIS: ICD-10-CM

## 2023-10-17 DIAGNOSIS — E11.69 HYPERLIPIDEMIA ASSOCIATED WITH TYPE 2 DIABETES MELLITUS: ICD-10-CM

## 2023-10-17 DIAGNOSIS — I10 ESSENTIAL HYPERTENSION: Primary | ICD-10-CM

## 2023-10-17 DIAGNOSIS — E78.5 HYPERLIPIDEMIA ASSOCIATED WITH TYPE 2 DIABETES MELLITUS: ICD-10-CM

## 2023-10-17 DIAGNOSIS — I70.0 ATHEROSCLEROSIS OF AORTA: ICD-10-CM

## 2023-10-17 DIAGNOSIS — I27.20 PULMONARY HYPERTENSION: ICD-10-CM

## 2023-10-17 DIAGNOSIS — I42.8 NICM (NONISCHEMIC CARDIOMYOPATHY): ICD-10-CM

## 2023-10-17 PROCEDURE — 3075F PR MOST RECENT SYSTOLIC BLOOD PRESS GE 130-139MM HG: ICD-10-PCS | Mod: CPTII,S$GLB,, | Performed by: INTERNAL MEDICINE

## 2023-10-17 PROCEDURE — 3044F HG A1C LEVEL LT 7.0%: CPT | Mod: CPTII,S$GLB,, | Performed by: INTERNAL MEDICINE

## 2023-10-17 PROCEDURE — 3062F POS MACROALBUMINURIA REV: CPT | Mod: CPTII,S$GLB,, | Performed by: INTERNAL MEDICINE

## 2023-10-17 PROCEDURE — 99999 PR PBB SHADOW E&M-EST. PATIENT-LVL V: CPT | Mod: PBBFAC,,, | Performed by: INTERNAL MEDICINE

## 2023-10-17 PROCEDURE — 1101F PR PT FALLS ASSESS DOC 0-1 FALLS W/OUT INJ PAST YR: ICD-10-PCS | Mod: CPTII,S$GLB,, | Performed by: INTERNAL MEDICINE

## 2023-10-17 PROCEDURE — 1159F MED LIST DOCD IN RCRD: CPT | Mod: CPTII,S$GLB,, | Performed by: INTERNAL MEDICINE

## 2023-10-17 PROCEDURE — 4010F PR ACE/ARB THEARPY RXD/TAKEN: ICD-10-PCS | Mod: CPTII,S$GLB,, | Performed by: INTERNAL MEDICINE

## 2023-10-17 PROCEDURE — 3288F FALL RISK ASSESSMENT DOCD: CPT | Mod: CPTII,S$GLB,, | Performed by: INTERNAL MEDICINE

## 2023-10-17 PROCEDURE — 99214 OFFICE O/P EST MOD 30 MIN: CPT | Mod: S$GLB,,, | Performed by: INTERNAL MEDICINE

## 2023-10-17 PROCEDURE — 3044F PR MOST RECENT HEMOGLOBIN A1C LEVEL <7.0%: ICD-10-PCS | Mod: CPTII,S$GLB,, | Performed by: INTERNAL MEDICINE

## 2023-10-17 PROCEDURE — 1159F PR MEDICATION LIST DOCUMENTED IN MEDICAL RECORD: ICD-10-PCS | Mod: CPTII,S$GLB,, | Performed by: INTERNAL MEDICINE

## 2023-10-17 PROCEDURE — 3062F PR POS MACROALBUMINURIA RESULT DOCUMENTED/REVIEW: ICD-10-PCS | Mod: CPTII,S$GLB,, | Performed by: INTERNAL MEDICINE

## 2023-10-17 PROCEDURE — 99214 PR OFFICE/OUTPT VISIT, EST, LEVL IV, 30-39 MIN: ICD-10-PCS | Mod: S$GLB,,, | Performed by: INTERNAL MEDICINE

## 2023-10-17 PROCEDURE — 1160F PR REVIEW ALL MEDS BY PRESCRIBER/CLIN PHARMACIST DOCUMENTED: ICD-10-PCS | Mod: CPTII,S$GLB,, | Performed by: INTERNAL MEDICINE

## 2023-10-17 PROCEDURE — 3008F BODY MASS INDEX DOCD: CPT | Mod: CPTII,S$GLB,, | Performed by: INTERNAL MEDICINE

## 2023-10-17 PROCEDURE — 1126F AMNT PAIN NOTED NONE PRSNT: CPT | Mod: CPTII,S$GLB,, | Performed by: INTERNAL MEDICINE

## 2023-10-17 PROCEDURE — 3066F PR DOCUMENTATION OF TREATMENT FOR NEPHROPATHY: ICD-10-PCS | Mod: CPTII,S$GLB,, | Performed by: INTERNAL MEDICINE

## 2023-10-17 PROCEDURE — 3075F SYST BP GE 130 - 139MM HG: CPT | Mod: CPTII,S$GLB,, | Performed by: INTERNAL MEDICINE

## 2023-10-17 PROCEDURE — 1126F PR PAIN SEVERITY QUANTIFIED, NO PAIN PRESENT: ICD-10-PCS | Mod: CPTII,S$GLB,, | Performed by: INTERNAL MEDICINE

## 2023-10-17 PROCEDURE — 3078F PR MOST RECENT DIASTOLIC BLOOD PRESSURE < 80 MM HG: ICD-10-PCS | Mod: CPTII,S$GLB,, | Performed by: INTERNAL MEDICINE

## 2023-10-17 PROCEDURE — 1101F PT FALLS ASSESS-DOCD LE1/YR: CPT | Mod: CPTII,S$GLB,, | Performed by: INTERNAL MEDICINE

## 2023-10-17 PROCEDURE — 3078F DIAST BP <80 MM HG: CPT | Mod: CPTII,S$GLB,, | Performed by: INTERNAL MEDICINE

## 2023-10-17 PROCEDURE — 99999 PR PBB SHADOW E&M-EST. PATIENT-LVL V: ICD-10-PCS | Mod: PBBFAC,,, | Performed by: INTERNAL MEDICINE

## 2023-10-17 PROCEDURE — 3066F NEPHROPATHY DOC TX: CPT | Mod: CPTII,S$GLB,, | Performed by: INTERNAL MEDICINE

## 2023-10-17 PROCEDURE — 3288F PR FALLS RISK ASSESSMENT DOCUMENTED: ICD-10-PCS | Mod: CPTII,S$GLB,, | Performed by: INTERNAL MEDICINE

## 2023-10-17 PROCEDURE — 1160F RVW MEDS BY RX/DR IN RCRD: CPT | Mod: CPTII,S$GLB,, | Performed by: INTERNAL MEDICINE

## 2023-10-17 PROCEDURE — 4010F ACE/ARB THERAPY RXD/TAKEN: CPT | Mod: CPTII,S$GLB,, | Performed by: INTERNAL MEDICINE

## 2023-10-17 PROCEDURE — 3008F PR BODY MASS INDEX (BMI) DOCUMENTED: ICD-10-PCS | Mod: CPTII,S$GLB,, | Performed by: INTERNAL MEDICINE

## 2023-10-17 NOTE — PROGRESS NOTES
Subjective:   Patient ID:  Crow Green is a 68 y.o. male who presents for follow up of No chief complaint on file.      69 yo AAM, came in for 3 M routine f/u  PMH CAD nonobstructive and small vessel Dz per LHC done in 11/16, PAF s/p PVI in  by Dr. Brown, CHFpEF 45%, DM, hTN, HLD and obesity. Asthma and SANDRITA on CPAP f/u with Dr. Alicea.    -11/2016, Pt was admitted to [facility] for cough with little sputum for 6 weeks. Had severe left chest pain only with cough and sitting up from the bed. No pain with exercise. Denied palpitation, dizziness, orthopnea, PND and syncope. Troponin up to 0.218 and trending down. Echo showed EF 45% and MPI showed anteroseptal positive.   Subsequent LHC showed d2 30% lesion and otherwise demi Dz.   -03/2017, he was admitted to [facility] for acute cholelithiasis, s/p lap aidan. Pt had PAF during the admisssion and has been on amiodarone and Eliquis. BNP was up to 144 from 44 done five months ago.  -05/2018 at [facility], MPI showed SPECT images at rest and stress studies showed a moderate-sized, moderate anteroseptal perfusion defect and a large, severe inferior wall perfusion defect that are fixed. The gated stress study demonstrated a left ventricular ejection fraction of 35%, and ECHo showed EF 50%. Chest CTA showed no PE.  -2018, had urine drug presumptive cocaine positive but pt declined to use cocaine.   08/2019 afib with rvr during PFT, and admitted to [facility] due to weakness and dizziness.TROPONIN 0.07 AND FLAT. Converted to sinus after cardizem and BB. F/u with Dr. Brown on 08/15 and advised PVI if recurrent Afib.   echo showed EF 45%.  S/p Afib ablation in  by Dr. Brown  Lives with his dad     visit  05 and  twice admitted for chest pain. EKG no acute stt change. troponin 0.199 and flat.    ECHO EF 40%, MPI no ischemia  C/o cp 3 times a week. Lasted for seconds at rest. With SOB, dizziness.   Pt had quick chest pain for seconds when he was  sitting in the office today.     08/2021 visit  Admitted again in  for chest pain  Drug screen cocaine positive. 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 No headache in PM.   NO recurrent rx. BP low. Visiting nurse helped to prepare the medications for 2 weeks and pt is medication compliant     visit  On scooter for few months due to hip pain and legs weakness  Breathing rx per pulm service.  No leg swelling, chest pain. Dizziness in AM. Med compliance     visit  Left shoulder pain improved after shoulder procedure and rehab  imbalance after the toe removal.   No chest angina pain. No NTG SL prn since nov 2021  SOB controlled by inhaler and breathing rx  No leg swelling. No smoking med compliance  EKG NSR and old anterior infarct    07-22 visit  Progressively more frequent angina, needs twice taking NTG SL. Occurred at rest.  No dyspnea palpitation,   Some dizziness and headachce. LDL 88 and A1c 6.4   echo EF 45% and mod LVH    07/23 visit  Run out of lasix for two weeks. Taking Lisinopril and forgot why not on entresto  BNP was 97 in 05/23 and was at 200 to 300 levels in 2022.   Had headache and blurred vision. Some sob.  Soul and back pain     Interval history  Sent to ER after last visit.few ER visits at [facility]. Ct showed no aorta aneurysm in chest and abd. Cocaine +  05/23 echo showed EF 50% at [facility]   Remains intermittent chest pain. Stable SOB. No palpitation faint   F/u at pain clinic and now cocaine clean   in 07/23          Past Medical History:   Diagnosis Date    Arthritis     Asthma     Atrial fibrillation     Atrial fibrillation with RVR 08/07/2019    Carotid artery stenosis     CHF (congestive heart failure)     Chronic obstructive pulmonary disease 08/05/2020    Depression     Dermatomyositis     Diabetes mellitus, type 2 Diagnosed in 2000    Elevated PSA     Follow  up 07/30/2020    Hypertension     Myocardial infarction     2017    Sleep apnea        Past Surgical History:   Procedure Laterality Date    ABLATION OF ARRHYTHMOGENIC FOCUS FOR ATRIAL FIBRILLATION N/A 2/27/2020    Procedure: Ablation atrial fibrillation;  Surgeon: Gio Brown MD;  Location: [facility] EP LAB;  Service: Cardiology;  Laterality: N/A;  afib, DAMIEN (cx if SR), PVI, PITO, anes, MB, 3 Prep    CHOLECYSTECTOMY      CLOSURE OF WOUND Left 7/1/2020    Procedure: CLOSURE, WOUND;  Surgeon: Barb Veras DPM;  Location: [facility] OR;  Service: Podiatry;  Laterality: Left;    COLONOSCOPY N/A 3/4/2022    Procedure: COLONOSCOPY;  Surgeon: Yolis Freitas MD;  Location: [facility] ENDO;  Service: Endoscopy;  Laterality: N/A;    ESOPHAGOGASTRODUODENOSCOPY N/A 3/4/2022    Procedure: EGD (ESOPHAGOGASTRODUODENOSCOPY);  Surgeon: Yolis Freitas MD;  Location: [facility] ENDO;  Service: Endoscopy;  Laterality: N/A;    INJECTION OF ANESTHETIC AGENT INTO SACROILIAC JOINT Left 3/24/2021    Procedure: Left BLOCK, SACROILIAC JOINT and bilateral rhomboid TPI;  Surgeon: Dillan Tsai MD;  Location: [facility] PAIN MGT;  Service: Pain Management;  Laterality: Left;    INTRALUMINAL GASTROINTESTINAL TRACT IMAGING VIA CAPSULE N/A 3/22/2022    Procedure: IMAGING PROCEDURE, GI TRACT, INTRALUMINAL, VIA CAPSULE;  Surgeon: Justice Martinez RN;  Location: [facility] ENDO;  Service: Endoscopy;  Laterality: N/A;    REVERSE TOTAL SHOULDER ARTHROPLASTY Left 1/10/2022    Procedure: ARTHROPLASTY, SHOULDER, TOTAL, REVERSE;  Surgeon: Anthony Alvarado MD;  Location: [facility] OR;  Service: Orthopedics;  Laterality: Left;  left reverse total shoulder arthroplasty     SELECTIVE INJECTION OF ANESTHETIC AGENT AROUND LUMBAR SPINAL NERVE ROOT BY TRANSFORAMINAL APPROACH Left 6/11/2020    Procedure: BLOCK, SPINAL NERVE ROOT, LUMBAR, SELECTIVE, TRANSFORAMINAL APPROACH Left L4-5, L5-S1 TFESI with RN IV sedation;  Surgeon: Dillan Tsai MD;  Location: [facility] PAIN MGT;  Service:  Pain Management;  Laterality: Left;    TOE AMPUTATION Left 6/29/2020    Procedure: AMPUTATION, TOE;  Surgeon: Barb Veras DPM;  [facility];  Service: Podiatry;  Laterality: Left;  great toe       Social History     Tobacco Use    Smoking status: Never    Smokeless tobacco: Never   Substance Use Topics    Alcohol use: Yes     Comment: rare, maybe holidays    Drug use: Not Currently     Types: Cocaine       Family History   Problem Relation Age of Onset    Hypertension Mother     Glaucoma Mother     Cataracts Mother     Diabetes Mother     Cataracts Father     Stroke Father     Glaucoma Sister         x3    Cataracts Sister         x3    Diabetes Sister         x1    Stroke Sister         x1    No Known Problems Brother     No Known Problems Daughter     No Known Problems Son     Glaucoma Maternal Aunt     Diabetes Maternal Aunt     Cancer Maternal Grandfather         lung (smoker)    Prostate cancer Neg Hx     Heart disease Neg Hx     Kidney disease Neg Hx          ROS    Objective:   Physical Exam  HENT:      Head: Normocephalic.   Eyes:      Pupils: Pupils are equal, round, and reactive to light.   Neck:      Thyroid: No thyromegaly.      Vascular: Normal carotid pulses. No carotid bruit or JVD.   Cardiovascular:      Rate and Rhythm: Normal rate and regular rhythm. No extrasystoles are present.     Chest Wall: PMI is not displaced.      Pulses: Normal pulses.      Heart sounds: Normal heart sounds. No murmur heard.     No gallop. No S3 sounds.   Pulmonary:      Effort: No respiratory distress.      Breath sounds: Normal breath sounds. No stridor.   Abdominal:      General: Bowel sounds are normal.      Palpations: Abdomen is soft.      Tenderness: There is no abdominal tenderness. There is no rebound.   Musculoskeletal:      Comments:      Skin:     Findings: No rash.   Neurological:      Mental Status: He is alert and oriented to person, place, and time.   Psychiatric:         Behavior: Behavior  normal.         Lab Results   Component Value Date    CHOL 117 07/08/2023    CHOL 103 05/23/2023    CHOL 152 06/10/2022     Lab Results   Component Value Date    HDL 36 (L) 07/08/2023    HDL 41 05/23/2023    HDL 43 06/10/2022     Lab Results   Component Value Date    LDLCALC 62 07/08/2023    LDLCALC 46 05/23/2023    LDLCALC 88.8 06/10/2022     Lab Results   Component Value Date    TRIG 97 07/08/2023    TRIG 80 05/23/2023    TRIG 101 06/10/2022     Lab Results   Component Value Date    CHOLHDL 28.3 06/10/2022    CHOLHDL 26.7 05/25/2021    CHOLHDL 27.1 05/24/2021       Chemistry        Component Value Date/Time     07/28/2023 0438     03/31/2023 0318    K 4.8 07/28/2023 0438    K 3.4 (L) 03/31/2023 0318     07/28/2023 0438     03/31/2023 0318    CO2 22 07/28/2023 0438    CO2 24 03/31/2023 0318    BUN 41 (H) 07/28/2023 0438    BUN 17 03/31/2023 0318    CREATININE 1.73 (H) 07/28/2023 0438    CREATININE 0.9 03/31/2023 0318     03/31/2023 0318        Component Value Date/Time    CALCIUM 8.6 (L) 07/28/2023 0438    CALCIUM 8.7 03/31/2023 0318    ALKPHOS 69 03/31/2023 0318    AST 15 03/31/2023 0318    ALT 12 03/31/2023 0318    BILITOT 0.2 03/31/2023 0318    ESTGFRAFRICA 43 07/28/2023 0438    ESTGFRAFRICA >60 07/31/2022 0634    EGFRNONAA >60 07/31/2022 0634          Lab Results   Component Value Date    HGBA1C 6.4 (H) 09/29/2023     Lab Results   Component Value Date    TSH 0.881 05/23/2023     Lab Results   Component Value Date    INR 1.2 07/07/2023    INR 1.4 05/22/2023    INR 1.0 11/07/2022     Lab Results   Component Value Date    WBC 8.00 03/31/2023    HGB 11.2 (L) 03/31/2023    HCT 35.0 (L) 03/31/2023    MCV 80 (L) 03/31/2023     03/31/2023     BMP  Sodium   Date Value Ref Range Status   07/28/2023 140 136 - 145 mmol/L Final   03/31/2023 139 136 - 145 mmol/L Final     Potassium   Date Value Ref Range Status   07/28/2023 4.8 3.5 - 5.1 mmol/L Final   03/31/2023 3.4 (L) 3.5 - 5.1  mmol/L Final     Chloride   Date Value Ref Range Status   07/28/2023 108 100 - 109 mmol/L Final   03/31/2023 104 95 - 110 mmol/L Final     CO2   Date Value Ref Range Status   03/31/2023 24 23 - 29 mmol/L Final     Carbon Dioxide   Date Value Ref Range Status   07/28/2023 22 22 - 33 mmol/L Final     BUN   Date Value Ref Range Status   03/31/2023 17 8 - 23 mg/dL Final     Blood Urea Nitrogen   Date Value Ref Range Status   07/28/2023 41 (H) 5 - 25 mg/dL Final     Creatinine   Date Value Ref Range Status   07/28/2023 1.73 (H) 0.57 - 1.25 mg/dL Final   03/31/2023 0.9 0.5 - 1.4 mg/dL Final     Calcium   Date Value Ref Range Status   07/28/2023 8.6 (L) 8.8 - 10.6 mg/dL Final   03/31/2023 8.7 8.7 - 10.5 mg/dL Final     Anion Gap   Date Value Ref Range Status   07/28/2023 10 8 - 16 mmol/L Final   03/31/2023 11 8 - 16 mmol/L Final     eGFR if    Date Value Ref Range Status   07/31/2022 >60 >60 mL/min/1.73 m^2 Final     eGFR    Date Value Ref Range Status   07/28/2023 43 mL/min/1.73mSq Final     Comment:     In accordance with NKF-ASN Task Force recommendation, calculation based on the Chronic Kidney Disease Epidemiology Collaboration (CKD-EPI) equation without adjustment for race. eGFR adjusted for gender and age and calculated in ml/min/1.73mSquared. eGFR cannot be calculated if patient is under 18 years of age.     Reference Range:   >= 60 ml/min/1.73mSquared.     eGFR if non    Date Value Ref Range Status   07/31/2022 >60 >60 mL/min/1.73 m^2 Final     Comment:     Calculation used to obtain the estimated glomerular filtration  rate (eGFR) is the CKD-EPI equation.        BNP  @LABRCNTIP(BNP,BNPTRIAGEBLO)@  @LABRCNTIP(troponini)@  CrCl cannot be calculated (Patient's most recent lab result is older than the maximum 7 days allowed.).  No results found in the last 24 hours.  No results found in the last 24 hours.  No results found in the last 24 hours.    Assessment:      1.  Essential hypertension    2. Nonobstructive coronary artery disease of native artery of native heart    3. Chronic combined systolic and diastolic heart failure    4. PAF (paroxysmal atrial fibrillation)    5. Hyperlipidemia associated with type 2 diabetes mellitus    6. Atherosclerosis of native coronary artery of native heart with unstable angina pectoris    7. Atherosclerosis of aorta    8. Bilateral carotid artery stenosis    9. Pulmonary hypertension- echo 7/2022    10. NICM (nonischemic cardiomyopathy)    11. Type 2 diabetes mellitus with mild nonproliferative retinopathy and macular edema, with long-term current use of insulin, unspecified laterality      CHFrEF recovered EF 50% compensated FC II  NICM  Recent cocaine abuse h/o  PAF on SR    Plan:   Continue Lisinopirl lasix 80 mg bid jardiance  Avoid BB due to h/o cocaine  Contiune Eliquis 5mg bis amlodipine and statin  Ok to h/o ASA  DM Rx per PCP  Counseled DASH  Check Lipid profile with PCP in 6 months  Recommend heart-healthy diet, weight control and regular exercise.  Agustin. Risk modification.   I have reviewed all pertinent labs and cardiac studies independently. Plans and recommendations have been formulated under my direct supervision. All questions answered and patient voiced understanding.   If symptoms persist go to the ED  RTC in 6 months

## 2023-10-30 ENCOUNTER — OFFICE VISIT (OUTPATIENT)
Dept: INTERNAL MEDICINE | Facility: CLINIC | Age: 68
End: 2023-10-30
Payer: MEDICARE

## 2023-10-30 ENCOUNTER — LAB VISIT (OUTPATIENT)
Dept: LAB | Facility: HOSPITAL | Age: 68
End: 2023-10-30
Attending: PHYSICIAN ASSISTANT
Payer: MEDICARE

## 2023-10-30 ENCOUNTER — TELEPHONE (OUTPATIENT)
Dept: INTERNAL MEDICINE | Facility: CLINIC | Age: 68
End: 2023-10-30
Payer: MEDICARE

## 2023-10-30 VITALS
SYSTOLIC BLOOD PRESSURE: 130 MMHG | TEMPERATURE: 97 F | BODY MASS INDEX: 33.13 KG/M2 | OXYGEN SATURATION: 97 % | DIASTOLIC BLOOD PRESSURE: 80 MMHG | HEART RATE: 66 BPM | HEIGHT: 65 IN | WEIGHT: 198.88 LBS

## 2023-10-30 DIAGNOSIS — F41.1 GAD (GENERALIZED ANXIETY DISORDER): ICD-10-CM

## 2023-10-30 DIAGNOSIS — E11.65 UNCONTROLLED TYPE 2 DIABETES MELLITUS WITH HYPERGLYCEMIA: ICD-10-CM

## 2023-10-30 DIAGNOSIS — E11.22 CKD STAGE 3 DUE TO TYPE 2 DIABETES MELLITUS: ICD-10-CM

## 2023-10-30 DIAGNOSIS — M85.80 OSTEOPENIA, UNSPECIFIED LOCATION: ICD-10-CM

## 2023-10-30 DIAGNOSIS — N18.30 CKD STAGE 3 DUE TO TYPE 2 DIABETES MELLITUS: ICD-10-CM

## 2023-10-30 DIAGNOSIS — I65.23 BILATERAL CAROTID ARTERY STENOSIS: ICD-10-CM

## 2023-10-30 DIAGNOSIS — M47.26 OSTEOARTHRITIS OF SPINE WITH RADICULOPATHY, LUMBAR REGION: ICD-10-CM

## 2023-10-30 DIAGNOSIS — E11.3219 TYPE 2 DIABETES MELLITUS WITH MILD NONPROLIFERATIVE RETINOPATHY AND MACULAR EDEMA, WITH LONG-TERM CURRENT USE OF INSULIN, UNSPECIFIED LATERALITY: ICD-10-CM

## 2023-10-30 DIAGNOSIS — F25.1 SCHIZOAFFECTIVE DISORDER, DEPRESSIVE TYPE: ICD-10-CM

## 2023-10-30 DIAGNOSIS — Z12.5 SCREENING FOR PROSTATE CANCER: ICD-10-CM

## 2023-10-30 DIAGNOSIS — Z79.4 TYPE 2 DIABETES MELLITUS WITH MILD NONPROLIFERATIVE RETINOPATHY AND MACULAR EDEMA, WITH LONG-TERM CURRENT USE OF INSULIN, UNSPECIFIED LATERALITY: ICD-10-CM

## 2023-10-30 DIAGNOSIS — E11.3312 MODERATE NONPROLIFERATIVE DIABETIC RETINOPATHY OF LEFT EYE WITH MACULAR EDEMA ASSOCIATED WITH TYPE 2 DIABETES MELLITUS: ICD-10-CM

## 2023-10-30 DIAGNOSIS — I48.0 PAF (PAROXYSMAL ATRIAL FIBRILLATION): Chronic | ICD-10-CM

## 2023-10-30 DIAGNOSIS — I10 ESSENTIAL HYPERTENSION: ICD-10-CM

## 2023-10-30 DIAGNOSIS — E11.3219 TYPE 2 DIABETES MELLITUS WITH MILD NONPROLIFERATIVE RETINOPATHY AND MACULAR EDEMA, WITH LONG-TERM CURRENT USE OF INSULIN, UNSPECIFIED LATERALITY: Primary | ICD-10-CM

## 2023-10-30 DIAGNOSIS — Z79.4 TYPE 2 DIABETES MELLITUS WITH MILD NONPROLIFERATIVE RETINOPATHY AND MACULAR EDEMA, WITH LONG-TERM CURRENT USE OF INSULIN, UNSPECIFIED LATERALITY: Primary | ICD-10-CM

## 2023-10-30 LAB
ALBUMIN SERPL BCP-MCNC: 3.1 G/DL (ref 3.5–5.2)
ALP SERPL-CCNC: 79 U/L (ref 55–135)
ALT SERPL W/O P-5'-P-CCNC: 15 U/L (ref 10–44)
ANION GAP SERPL CALC-SCNC: 11 MMOL/L (ref 8–16)
ANION GAP SERPL CALC-SCNC: 11 MMOL/L (ref 8–16)
AST SERPL-CCNC: 21 U/L (ref 10–40)
BILIRUB SERPL-MCNC: 0.2 MG/DL (ref 0.1–1)
BUN SERPL-MCNC: 16 MG/DL (ref 8–23)
BUN SERPL-MCNC: 16 MG/DL (ref 8–23)
CALCIUM SERPL-MCNC: 9.5 MG/DL (ref 8.7–10.5)
CALCIUM SERPL-MCNC: 9.5 MG/DL (ref 8.7–10.5)
CHLORIDE SERPL-SCNC: 104 MMOL/L (ref 95–110)
CHLORIDE SERPL-SCNC: 104 MMOL/L (ref 95–110)
CO2 SERPL-SCNC: 28 MMOL/L (ref 23–29)
CO2 SERPL-SCNC: 28 MMOL/L (ref 23–29)
COMPLEXED PSA SERPL-MCNC: 2.9 NG/ML (ref 0–4)
CREAT SERPL-MCNC: 1.2 MG/DL (ref 0.5–1.4)
CREAT SERPL-MCNC: 1.2 MG/DL (ref 0.5–1.4)
EST. GFR  (NO RACE VARIABLE): >60 ML/MIN/1.73 M^2
EST. GFR  (NO RACE VARIABLE): >60 ML/MIN/1.73 M^2
GLUCOSE SERPL-MCNC: 157 MG/DL (ref 70–110)
GLUCOSE SERPL-MCNC: 157 MG/DL (ref 70–110)
POTASSIUM SERPL-SCNC: 3.9 MMOL/L (ref 3.5–5.1)
POTASSIUM SERPL-SCNC: 3.9 MMOL/L (ref 3.5–5.1)
PROT SERPL-MCNC: 7.8 G/DL (ref 6–8.4)
SODIUM SERPL-SCNC: 143 MMOL/L (ref 136–145)
SODIUM SERPL-SCNC: 143 MMOL/L (ref 136–145)

## 2023-10-30 PROCEDURE — 36415 COLL VENOUS BLD VENIPUNCTURE: CPT | Performed by: FAMILY MEDICINE

## 2023-10-30 PROCEDURE — 3079F PR MOST RECENT DIASTOLIC BLOOD PRESSURE 80-89 MM HG: ICD-10-PCS | Mod: CPTII,S$GLB,, | Performed by: FAMILY MEDICINE

## 2023-10-30 PROCEDURE — 1125F AMNT PAIN NOTED PAIN PRSNT: CPT | Mod: CPTII,S$GLB,, | Performed by: FAMILY MEDICINE

## 2023-10-30 PROCEDURE — 1159F MED LIST DOCD IN RCRD: CPT | Mod: CPTII,S$GLB,, | Performed by: FAMILY MEDICINE

## 2023-10-30 PROCEDURE — 3044F PR MOST RECENT HEMOGLOBIN A1C LEVEL <7.0%: ICD-10-PCS | Mod: CPTII,S$GLB,, | Performed by: FAMILY MEDICINE

## 2023-10-30 PROCEDURE — 3008F BODY MASS INDEX DOCD: CPT | Mod: CPTII,S$GLB,, | Performed by: FAMILY MEDICINE

## 2023-10-30 PROCEDURE — 99214 OFFICE O/P EST MOD 30 MIN: CPT | Mod: S$GLB,,, | Performed by: FAMILY MEDICINE

## 2023-10-30 PROCEDURE — 1101F PR PT FALLS ASSESS DOC 0-1 FALLS W/OUT INJ PAST YR: ICD-10-PCS | Mod: CPTII,S$GLB,, | Performed by: FAMILY MEDICINE

## 2023-10-30 PROCEDURE — 3066F NEPHROPATHY DOC TX: CPT | Mod: CPTII,S$GLB,, | Performed by: FAMILY MEDICINE

## 2023-10-30 PROCEDURE — 3066F PR DOCUMENTATION OF TREATMENT FOR NEPHROPATHY: ICD-10-PCS | Mod: CPTII,S$GLB,, | Performed by: FAMILY MEDICINE

## 2023-10-30 PROCEDURE — 3008F PR BODY MASS INDEX (BMI) DOCUMENTED: ICD-10-PCS | Mod: CPTII,S$GLB,, | Performed by: FAMILY MEDICINE

## 2023-10-30 PROCEDURE — 3075F SYST BP GE 130 - 139MM HG: CPT | Mod: CPTII,S$GLB,, | Performed by: FAMILY MEDICINE

## 2023-10-30 PROCEDURE — 1125F PR PAIN SEVERITY QUANTIFIED, PAIN PRESENT: ICD-10-PCS | Mod: CPTII,S$GLB,, | Performed by: FAMILY MEDICINE

## 2023-10-30 PROCEDURE — 3044F HG A1C LEVEL LT 7.0%: CPT | Mod: CPTII,S$GLB,, | Performed by: FAMILY MEDICINE

## 2023-10-30 PROCEDURE — 4010F PR ACE/ARB THEARPY RXD/TAKEN: ICD-10-PCS | Mod: CPTII,S$GLB,, | Performed by: FAMILY MEDICINE

## 2023-10-30 PROCEDURE — 3062F POS MACROALBUMINURIA REV: CPT | Mod: CPTII,S$GLB,, | Performed by: FAMILY MEDICINE

## 2023-10-30 PROCEDURE — 84153 ASSAY OF PSA TOTAL: CPT | Mod: GA | Performed by: FAMILY MEDICINE

## 2023-10-30 PROCEDURE — 3079F DIAST BP 80-89 MM HG: CPT | Mod: CPTII,S$GLB,, | Performed by: FAMILY MEDICINE

## 2023-10-30 PROCEDURE — 99999 PR PBB SHADOW E&M-EST. PATIENT-LVL V: CPT | Mod: PBBFAC,,, | Performed by: FAMILY MEDICINE

## 2023-10-30 PROCEDURE — 1101F PT FALLS ASSESS-DOCD LE1/YR: CPT | Mod: CPTII,S$GLB,, | Performed by: FAMILY MEDICINE

## 2023-10-30 PROCEDURE — 1159F PR MEDICATION LIST DOCUMENTED IN MEDICAL RECORD: ICD-10-PCS | Mod: CPTII,S$GLB,, | Performed by: FAMILY MEDICINE

## 2023-10-30 PROCEDURE — 3288F PR FALLS RISK ASSESSMENT DOCUMENTED: ICD-10-PCS | Mod: CPTII,S$GLB,, | Performed by: FAMILY MEDICINE

## 2023-10-30 PROCEDURE — 80053 COMPREHEN METABOLIC PANEL: CPT | Performed by: PHYSICIAN ASSISTANT

## 2023-10-30 PROCEDURE — 3062F PR POS MACROALBUMINURIA RESULT DOCUMENTED/REVIEW: ICD-10-PCS | Mod: CPTII,S$GLB,, | Performed by: FAMILY MEDICINE

## 2023-10-30 PROCEDURE — 3075F PR MOST RECENT SYSTOLIC BLOOD PRESS GE 130-139MM HG: ICD-10-PCS | Mod: CPTII,S$GLB,, | Performed by: FAMILY MEDICINE

## 2023-10-30 PROCEDURE — 99214 PR OFFICE/OUTPT VISIT, EST, LEVL IV, 30-39 MIN: ICD-10-PCS | Mod: S$GLB,,, | Performed by: FAMILY MEDICINE

## 2023-10-30 PROCEDURE — 99999 PR PBB SHADOW E&M-EST. PATIENT-LVL V: ICD-10-PCS | Mod: PBBFAC,,, | Performed by: FAMILY MEDICINE

## 2023-10-30 PROCEDURE — 4010F ACE/ARB THERAPY RXD/TAKEN: CPT | Mod: CPTII,S$GLB,, | Performed by: FAMILY MEDICINE

## 2023-10-30 PROCEDURE — 3288F FALL RISK ASSESSMENT DOCD: CPT | Mod: CPTII,S$GLB,, | Performed by: FAMILY MEDICINE

## 2023-10-30 NOTE — PROGRESS NOTES
Subjective:      Patient ID: Crow Green is a . male.    Chief Complaint: Recommend covid booster via pharmacy      HPI. Dm doing well up-to-date with diabetes clinic     Nonobstructive CAD atrial fibrillation chf history anticoagulation saw Cardiology recently aspirin discontinued    Chr sciatica and left mid back pain (two months)( few ed visits ANd ct abd late march ok);has pain mgt appt soonto eval    Robb:  Overdue recheck    Asthma up-to-date with pulmonary sleep apnea    Myositis concern referral Rheumatology done appointment scheduled;was on Imuran for dermatomyositis.   in the past    July [facility] hospitalization decreased GFR compared to prior had improved due for recheck      Patient Active Problem List   Diagnosis    ED (erectile dysfunction)    Non-proliferative diabetic retinopathy, mild, both eyes    Essential hypertension    Diabetic polyneuropathy    Nonobstructive coronary artery disease of native artery of native heart    Chronic combined systolic and diastolic heart failure    Type 2 diabetes mellitus    SANDRITA (obstructive sleep apnea)    H/O Asthma    Psychophysiological insomnia    Nightmares    Sleep related gastroesophageal reflux disease    Inadequate sleep hygiene    Chest pain    PAF (paroxysmal atrial fibrillation)    At risk for medication noncompliance    Obesity (BMI 30.0-34.9)    Indeterminate stage chronic open angle glaucoma    Right hip pain    Gait instability    Chronic right shoulder pain    Arthritis    Left sided sciatica    Osteoarthritis of spine with radiculopathy, lumbar region    HNP (herniated nucleus pulposus), lumbar    Gastroesophageal reflux disease without esophagitis    Hypogonadism in male    Disorder of parathyroid gland    Hyperlipidemia associated with type 2 diabetes mellitus    Traumatic tear of left rotator cuff    Other chronic pain    Left shoulder pain    Complete tear of left rotator cuff    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  with macular edema associated with type 2 diabetes mellitus    Lumbar radiculopathy    Diabetic ulcer of toe of left foot associated with type 2 diabetes mellitus, with necrosis of muscle    Ulcer of left foot    Elevated troponin    Hypotension due to medication    Dermatomyositis    Postprocedural hypotension    Advanced directives, counseling/discussion    Schizoaffective disorder, depressive type    Chronic obstructive pulmonary disease    MDD (major depressive disorder), recurrent episode, moderate    DOMINIK (generalized anxiety disorder)    Bilateral carpal tunnel syndrome    Rotator cuff tear arthropathy of left shoulder    Sacroiliac dysfunction    Atherosclerosis of native coronary artery of native heart with unstable angina pectoris    Coronary vasospasm    Cocaine abuse    Osteopenia    Atherosclerosis of aorta    Cardiac asthma    Chronic gout of multiple sites    Acquired absence of left great toe    Acute on chronic respiratory failure with hypoxia    Hypoxia    Cardiomyopathy    Bilateral carotid artery stenosis    Substance abuse(UDS + for cocaine )    Immunocompromised patient    Other reduced mobility    Abnormality of gait and mobility    Dependence on supplemental oxygen    Positive depression screening    Pulmonary hypertension- echo 7/2022      Past Medical History:   Diagnosis Date    Arthritis     Asthma     Atrial fibrillation     Atrial fibrillation with RVR 08/07/2019    Carotid artery stenosis     CHF (congestive heart failure)     Chronic obstructive pulmonary disease 08/05/2020    Depression     Dermatomyositis     Diabetes mellitus, type 2 Diagnosed in 2000    Elevated PSA     Follow up 07/30/2020    Hypertension     Myocardial infarction     2017    Sleep apnea       Past Surgical History:   Procedure Laterality Date    ABLATION OF ARRHYTHMOGENIC FOCUS FOR ATRIAL FIBRILLATION N/A 2/27/2020    Procedure: Ablation atrial fibrillation;  Surgeon: Gio Brown MD;  Location: [facility]  LAB;  Service: Cardiology;  Laterality: N/A;  afib, DAMIEN (cx if SR), PVI, PITO, anes, MB, 3 Prep    CHOLECYSTECTOMY      CLOSURE OF WOUND Left 7/1/2020    Procedure: CLOSURE, WOUND;  Surgeon: Barb Veras DPM;  Location: [facility] OR;  Service: Podiatry;  Laterality: Left;    COLONOSCOPY N/A 3/4/2022    Procedure: COLONOSCOPY;  Surgeon: Yolis Freitas MD;  Location: [facility] ENDO;  Service: Endoscopy;  Laterality: N/A;    ESOPHAGOGASTRODUODENOSCOPY N/A 3/4/2022    Procedure: EGD (ESOPHAGOGASTRODUODENOSCOPY);  Surgeon: Yolis Freitas MD;  Location: [facility] ENDO;  Service: Endoscopy;  Laterality: N/A;    INJECTION OF ANESTHETIC AGENT INTO SACROILIAC JOINT Left 3/24/2021    Procedure: Left BLOCK, SACROILIAC JOINT and bilateral rhomboid TPI;  Surgeon: Dillan Tsai MD;  Location: [facility] PAIN MGT;  Service: Pain Management;  Laterality: Left;    INTRALUMINAL GASTROINTESTINAL TRACT IMAGING VIA CAPSULE N/A 3/22/2022    Procedure: IMAGING PROCEDURE, GI TRACT, INTRALUMINAL, VIA CAPSULE;  Surgeon: Justice Martinez RN;  Location: [facility] ENDO;  Service: Endoscopy;  Laterality: N/A;    REVERSE TOTAL SHOULDER ARTHROPLASTY Left 1/10/2022    Procedure: ARTHROPLASTY, SHOULDER, TOTAL, REVERSE;  Surgeon: Anthony Alvarado MD;  Location: [facility];  Service: Orthopedics;  Laterality: Left;  left reverse total shoulder arthroplasty     SELECTIVE INJECTION OF ANESTHETIC AGENT AROUND LUMBAR SPINAL NERVE ROOT BY TRANSFORAMINAL APPROACH Left 6/11/2020    Procedure: BLOCK, SPINAL NERVE ROOT, LUMBAR, SELECTIVE, TRANSFORAMINAL APPROACH Left L4-5, L5-S1 TFESI with RN IV sedation;  Surgeon: Dillan Tsai MD;  Location: [facility] PAIN MGT;  Service: Pain Management;  Laterality: Left;    TOE AMPUTATION Left 6/29/2020    Procedure: AMPUTATION, TOE;  Surgeon: Barb Veras DPM;  Location: [facility] OR;  Service: Podiatry;  Laterality: Left;  great toe      Social History     Socioeconomic History    Marital status: Legally     Number of children: 5    Occupational History    Occupation: disabled    Occupation: PT at [employer]    Tobacco Use    Smoking status: Never    Smokeless tobacco: Never   Substance and Sexual Activity    Alcohol use: Yes     Comment: rare, maybe holidays    Drug use: Not Currently     Types: Cocaine    Sexual activity: Not Currently     Partners: Female   Social History Narrative    Utilizing [employer] transportation which has been unreliable.      Social Determinants of Health     Financial Resource Strain: Low Risk     Difficulty of Paying Living Expenses: Not very hard   Food Insecurity: No Food Insecurity    Worried About Running Out of Food in the Last Year: Never true    Ran Out of Food in the Last Year: Never true   Transportation Needs: Unmet Transportation Needs    Lack of Transportation (Medical): Yes    Lack of Transportation (Non-Medical): Yes   Physical Activity: Inactive    Days of Exercise per Week: 0 days    Minutes of Exercise per Session: 0 min   Stress: Stress Concern Present    Feeling of Stress : Rather much   Social Connections: Moderately Isolated    Frequency of Communication with Friends and Family: Once a week    Frequency of Social Gatherings with Friends and Family: Once a week    Attends Gnosticist Services: More than 4 times per year    Active Member of Clubs or Organizations: No    Attends Club or Organization Meetings: More than 4 times per year    Marital Status:    Housing Stability: Low Risk     Unable to Pay for Housing in the Last Year: No    Number of Places Lived in the Last Year: 1    Unstable Housing in the Last Year: No      Family History   Problem Relation Age of Onset    Hypertension Mother     Glaucoma Mother     Cataracts Mother     Diabetes Mother     Cataracts Father     Stroke Father     Glaucoma Sister         x3    Cataracts Sister         x3    Diabetes Sister         x1    Stroke Sister         x1    No Known Problems Brother     No Known Problems Daughter     No  Known Problems Son     Glaucoma Maternal Aunt     Diabetes Maternal Aunt     Cancer Maternal Grandfather         lung (smoker)    Prostate cancer Neg Hx     Heart disease Neg Hx     Kidney disease Neg Hx       Review of Systems    Cardiovascular: no chest pain  Chest: no shortness of breath  Abd: no abd pain  Remainder review of systems negative      Objective:     Physical Exam  Vitals and nursing note reviewed.   Constitutional:       Appearance: He is well-developed.   HENT:      Head: Normocephalic and atraumatic.      Right Ear: External ear normal.      Left Ear: External ear normal.      Nose: Nose normal.   Eyes:      General: No scleral icterus.     Conjunctiva/sclera: Conjunctivae normal.      Pupils: Pupils are equal, round, and reactive to light.   Neck:      Vascular: No carotid bruit.   Cardiovascular:      Rate and Rhythm: Normal rate and regular rhythm.      Heart sounds: Normal heart sounds. No murmur heard.    No friction rub. No gallop.   Pulmonary:      Effort: Pulmonary effort is normal.      Breath sounds: Normal breath sounds. No wheezing.     Musculoskeletal:         General: No tenderness. Normal range of motion.      Cervical back: Normal range of motion and neck supple.   Lymphadenopathy:      Cervical: No cervical adenopathy.   Skin:     General: Skin is warm and dry.      Findings: No erythema or rash.   Neurological:      Mental Status: He is alert and oriented to person, place, and time.      Cranial Nerves: No cranial nerve deficit.      Coordination: Coordination normal.   Psychiatric:         Behavior: Behavior normal.         Thought Content: Thought content normal.         Judgment: Judgment normal.     Assessment:         ICD-10-CM ICD-9-CM      V70.0   2. PAF (paroxysmal atrial fibrillation)  I48.0 427.31   3. Essential hypertension  I10 401.9   4. Type 2 diabetes mellitus with mild nonproliferative retinopathy and macular edema, with long-term current use of insulin,  unspecified laterality  E11.3219 250.50    Z79.4 362.04     V58.67     362.07   5. 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 associated with type 2 diabetes mellitus  E11.3312 250.50     362.07     362.05   6. Schizoaffective disorder, depressive type  F25.1 295.70   7. DOMINIK (generalized anxiety disorder)  F41.1 300.02   8. Osteopenia, unspecified location  M85.80 733.90   9. Chronic gout of multiple sites, unspecified cause  M1A.09X0 274.02   10. Bilateral carotid artery stenosis  I65.23 433.10     433.30   11. Other specified disorders of bone density and structure, multiple sites  M85.89 733.99    Elev creat  Plan:     Pain mgmt they have tried calling him but he states he is only available by phone 2012 and 1 message sent to pain clinic scheduling about this information    He requests home health referrals have been done in the past he was seen in the past multiple medical issues need for help with medication management (he has had difficulty keeping up with which meds he is taking ) he is also had a outpatient case management referral done    Follow up with Pulmonary Cardiology Urology as planned    Has rheumatology appointment for myositis concern    Follow-up diabetes clinic when due    Consider 65+ clinic message sent to 1 of the physicians there      F/ [place] 6 months    Has eye doctor appointment scheduled     Recheck creatinine if still elevated then nephrology referral make sure non any nephrotoxic medications      Lab today  F/ u [place] 6 months  Also due carotid u/s (missed prior ); can schedule with another upcoming appt    Type 2 diabetes mellitus with mild nonproliferative retinopathy and macular edema, with long-term current use of insulin, unspecified laterality  -     Basic Metabolic Panel; Future; Expected date: 10/30/2023  -     Ambulatory referral/consult to Home Health; Future; Expected date: 10/31/2023    Screening for prostate cancer  -     PSA,  Screening; Future; Expected date: 10/30/2023    Osteoarthritis of spine with radiculopathy, lumbar region  -     Ambulatory referral/consult to Home Health; Future; Expected date: 10/31/2023    Osteopenia, unspecified location    Bilateral carotid artery stenosis    PAF (paroxysmal atrial fibrillation)    DOMINIK (generalized anxiety disorder)    Schizoaffective disorder, depressive type    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 associated with type 2 diabetes mellitus    Essential hypertension

## 2023-10-30 NOTE — Clinical Note
Hello He is still interested in pain appt. I see where you called. He is only available at the number listed between 12-1. Please try to contact him again.   Thanks Dr Polanco

## 2023-10-30 NOTE — TELEPHONE ENCOUNTER
----- Message from Peggy Arteaga sent at 10/30/2023 10:06 AM CDT -----  Regarding: Same Day Appt  Contact: Crow  .Type:  Same Day Appointment Request    Caller is requesting a same day appointment.  Caller declined first available appointment listed below.    Name of Caller: Crow   When is the first available appointment?  Symptoms:  Best Call Back Number: [phone] (home)    Additional Information:  Crow need to come in today later due to the transportation company.

## 2023-11-02 PROCEDURE — G0180 PR HOME HEALTH MD CERTIFICATION: ICD-10-PCS | Mod: ,,, | Performed by: FAMILY MEDICINE

## 2023-11-02 PROCEDURE — G0180 MD CERTIFICATION HHA PATIENT: HCPCS | Mod: ,,, | Performed by: FAMILY MEDICINE

## 2023-11-13 DIAGNOSIS — E11.3299 TYPE 2 DIABETES MELLITUS WITH MILD NONPROLIFERATIVE RETINOPATHY, WITH LONG-TERM CURRENT USE OF INSULIN, MACULAR EDEMA PRESENCE UNSPECIFIED, UNSPECIFIED LATERALITY: ICD-10-CM

## 2023-11-13 DIAGNOSIS — Z79.4 TYPE 2 DIABETES MELLITUS WITH MILD NONPROLIFERATIVE RETINOPATHY, WITH LONG-TERM CURRENT USE OF INSULIN, MACULAR EDEMA PRESENCE UNSPECIFIED, UNSPECIFIED LATERALITY: ICD-10-CM

## 2023-11-13 RX ORDER — INSULIN ASPART 100 [IU]/ML
INJECTION, SOLUTION INTRAVENOUS; SUBCUTANEOUS
Qty: 15 ML | Refills: 10 | Status: SHIPPED | OUTPATIENT
Start: 2023-11-13

## 2023-11-15 ENCOUNTER — DOCUMENT SCAN (OUTPATIENT)
Dept: HOME HEALTH SERVICES | Facility: HOSPITAL | Age: 68
End: 2023-11-15
Payer: MEDICARE

## 2023-11-15 ENCOUNTER — TELEPHONE (OUTPATIENT)
Dept: DIABETES | Facility: CLINIC | Age: 68
End: 2023-11-15
Payer: MEDICARE

## 2023-11-15 RX ORDER — LISINOPRIL 10 MG/1
TABLET ORAL
Qty: 90 TABLET | Refills: 3 | Status: SHIPPED | OUTPATIENT
Start: 2023-11-15

## 2023-11-15 RX ORDER — LISINOPRIL 30 MG/1
TABLET ORAL
Qty: 90 TABLET | Refills: 3 | Status: SHIPPED | OUTPATIENT
Start: 2023-11-15

## 2023-11-20 NOTE — PATIENT INSTRUCTIONS
I reviewed chart and sibling chart . I noted Bipin Lopez missed neurosurgery appointment and [facility] missed audiology . I  submitted new C&Y referral for this family due to medical neglect . Ref # [id number]     I will continue to follow and offer assistance. Micky Monique KARON 4/11/23     Well care 5/17/23 seen      Audiology 9/6/23 no show  needs SAMARA  10/30/23 10 am  rescheduled for 10/30/23 miessed rescheduled for 11/20/23 11 am MISSED      Dental surgery multiple teeth pulled 5/22/23 follow up 10/5/23 [facility]      Developmental peds packet  Mailed out 7/7/23 mom states she completed. [facility] surgery fistula closure 5/4/23     Cardiology , GI , neurology follow up PRN      Need eye follow up     MCG completed 9/5/23      [facility] 5/29/09      Well visit missed 8/22/23 rescheduled for 10/10/23  seen need 6 month HPV and well in 1 year . Mental health missed multiple appointments trying to get preventative measures . [facility] neurosurgery  referral faxed on 11/14/23 2pm  MISSED       eye follow up      MRI 10/23/23 9:30 [facility] . cancelled due to insurance denial .   MCG 10/23/23     [facility] 5/5/12     Well visit last seen 8/6/18  10/9/23 seen follow up one year    needs labs       Mental health ? Missed multiple appointments trying to get services with preventative measures . Eye follow up      Dental seen 6/27/23 needs 6 month follow up . Shaheed Walker 2/26/14      Well visit 2/1/23 seen      Sleep study 9/6/23 missed  need to reschedule  MISSED       Eye follow up       Dental seen 6/27/23 needs 6 month follow up   C&Y closed   [facility] services closed . Metolazone 2.5 mg 30 minutes before taking Lasix  First week, daily  From second, ONLY takes on M, W, F.

## 2023-11-24 ENCOUNTER — EXTERNAL HOME HEALTH (OUTPATIENT)
Dept: HOME HEALTH SERVICES | Facility: HOSPITAL | Age: 68
End: 2023-11-24
Payer: MEDICARE

## 2023-11-28 ENCOUNTER — HOSPITAL ENCOUNTER (OUTPATIENT)
Dept: RADIOLOGY | Facility: HOSPITAL | Age: 68
Discharge: HOME | End: 2023-11-28
Attending: FAMILY MEDICINE
Payer: MEDICARE

## 2023-11-28 ENCOUNTER — CLINICAL SUPPORT (OUTPATIENT)
Dept: DIABETES | Facility: CLINIC | Age: 68
End: 2023-11-28
Payer: MEDICARE

## 2023-11-28 ENCOUNTER — OFFICE VISIT (OUTPATIENT)
Dept: OPHTHALMOLOGY | Facility: CLINIC | Age: 68
End: 2023-11-28
Payer: MEDICARE

## 2023-11-28 ENCOUNTER — TELEPHONE (OUTPATIENT)
Dept: PODIATRY | Facility: CLINIC | Age: 68
End: 2023-11-28
Payer: MEDICARE

## 2023-11-28 VITALS — BODY MASS INDEX: 32.76 KG/M2 | WEIGHT: 196.88 LBS

## 2023-11-28 DIAGNOSIS — H52.03 HYPEROPIA WITH ASTIGMATISM AND PRESBYOPIA, BILATERAL: ICD-10-CM

## 2023-11-28 DIAGNOSIS — H25.13 NUCLEAR SCLEROSIS, BILATERAL: ICD-10-CM

## 2023-11-28 DIAGNOSIS — E11.3312 MODERATE NONPROLIFERATIVE DIABETIC RETINOPATHY OF LEFT EYE WITH MACULAR EDEMA ASSOCIATED WITH TYPE 2 DIABETES MELLITUS: ICD-10-CM

## 2023-11-28 DIAGNOSIS — H52.4 HYPEROPIA WITH ASTIGMATISM AND PRESBYOPIA, BILATERAL: ICD-10-CM

## 2023-11-28 DIAGNOSIS — H40.1132 PRIMARY OPEN ANGLE GLAUCOMA (POAG) OF BOTH EYES, MODERATE STAGE: ICD-10-CM

## 2023-11-28 DIAGNOSIS — E11.65 UNCONTROLLED TYPE 2 DIABETES MELLITUS WITH HYPERGLYCEMIA: ICD-10-CM

## 2023-11-28 DIAGNOSIS — E11.3293 MILD NONPROLIFERATIVE DIABETIC RETINOPATHY OF BOTH EYES WITHOUT MACULAR EDEMA ASSOCIATED WITH TYPE 2 DIABETES MELLITUS: Primary | ICD-10-CM

## 2023-11-28 DIAGNOSIS — I65.23 BILATERAL CAROTID ARTERY STENOSIS: ICD-10-CM

## 2023-11-28 DIAGNOSIS — H25.013 CORTICAL AGE-RELATED CATARACT OF BOTH EYES: ICD-10-CM

## 2023-11-28 DIAGNOSIS — H52.203 HYPEROPIA WITH ASTIGMATISM AND PRESBYOPIA, BILATERAL: ICD-10-CM

## 2023-11-28 DIAGNOSIS — E11.36 DIABETIC CATARACT: ICD-10-CM

## 2023-11-28 PROCEDURE — 92015 DETERMINE REFRACTIVE STATE: CPT | Mod: S$GLB,,, | Performed by: OPTOMETRIST

## 2023-11-28 PROCEDURE — 93880 EXTRACRANIAL BILAT STUDY: CPT | Mod: TC

## 2023-11-28 PROCEDURE — 4010F PR ACE/ARB THEARPY RXD/TAKEN: ICD-10-PCS | Mod: CPTII,S$GLB,, | Performed by: OPTOMETRIST

## 2023-11-28 PROCEDURE — 2022F PR DILATED RETINAL EYE EXAM WITH INTERP/REVIEW: ICD-10-PCS | Mod: CPTII,S$GLB,, | Performed by: OPTOMETRIST

## 2023-11-28 PROCEDURE — 93880 EXTRACRANIAL BILAT STUDY: CPT | Mod: 26,,, | Performed by: RADIOLOGY

## 2023-11-28 PROCEDURE — 99999 PR PBB SHADOW E&M-EST. PATIENT-LVL IV: ICD-10-PCS | Mod: PBBFAC,,, | Performed by: OPTOMETRIST

## 2023-11-28 PROCEDURE — 99999 PR PBB SHADOW E&M-EST. PATIENT-LVL IV: CPT | Mod: PBBFAC,,, | Performed by: OPTOMETRIST

## 2023-11-28 PROCEDURE — 4010F ACE/ARB THERAPY RXD/TAKEN: CPT | Mod: CPTII,S$GLB,, | Performed by: OPTOMETRIST

## 2023-11-28 PROCEDURE — 3066F NEPHROPATHY DOC TX: CPT | Mod: CPTII,S$GLB,, | Performed by: OPTOMETRIST

## 2023-11-28 PROCEDURE — 99999 PR PBB SHADOW E&M-EST. PATIENT-LVL IV: ICD-10-PCS | Mod: PBBFAC,,, | Performed by: DIETITIAN, REGISTERED

## 2023-11-28 PROCEDURE — 1160F RVW MEDS BY RX/DR IN RCRD: CPT | Mod: CPTII,S$GLB,, | Performed by: OPTOMETRIST

## 2023-11-28 PROCEDURE — 3066F PR DOCUMENTATION OF TREATMENT FOR NEPHROPATHY: ICD-10-PCS | Mod: CPTII,S$GLB,, | Performed by: OPTOMETRIST

## 2023-11-28 PROCEDURE — 93880 US CAROTID BILATERAL: ICD-10-PCS | Mod: 26,,, | Performed by: RADIOLOGY

## 2023-11-28 PROCEDURE — 99214 OFFICE O/P EST MOD 30 MIN: CPT | Mod: S$GLB,,, | Performed by: OPTOMETRIST

## 2023-11-28 PROCEDURE — 3044F HG A1C LEVEL LT 7.0%: CPT | Mod: CPTII,S$GLB,, | Performed by: OPTOMETRIST

## 2023-11-28 PROCEDURE — 1160F PR REVIEW ALL MEDS BY PRESCRIBER/CLIN PHARMACIST DOCUMENTED: ICD-10-PCS | Mod: CPTII,S$GLB,, | Performed by: OPTOMETRIST

## 2023-11-28 PROCEDURE — G0108 DIAB MANAGE TRN  PER INDIV: HCPCS | Mod: S$GLB,,, | Performed by: DIETITIAN, REGISTERED

## 2023-11-28 PROCEDURE — 1159F PR MEDICATION LIST DOCUMENTED IN MEDICAL RECORD: ICD-10-PCS | Mod: CPTII,S$GLB,, | Performed by: OPTOMETRIST

## 2023-11-28 PROCEDURE — 1159F MED LIST DOCD IN RCRD: CPT | Mod: CPTII,S$GLB,, | Performed by: OPTOMETRIST

## 2023-11-28 PROCEDURE — 3062F PR POS MACROALBUMINURIA RESULT DOCUMENTED/REVIEW: ICD-10-PCS | Mod: CPTII,S$GLB,, | Performed by: OPTOMETRIST

## 2023-11-28 PROCEDURE — 3044F PR MOST RECENT HEMOGLOBIN A1C LEVEL <7.0%: ICD-10-PCS | Mod: CPTII,S$GLB,, | Performed by: OPTOMETRIST

## 2023-11-28 PROCEDURE — 3062F POS MACROALBUMINURIA REV: CPT | Mod: CPTII,S$GLB,, | Performed by: OPTOMETRIST

## 2023-11-28 PROCEDURE — 99214 PR OFFICE/OUTPT VISIT, EST, LEVL IV, 30-39 MIN: ICD-10-PCS | Mod: S$GLB,,, | Performed by: OPTOMETRIST

## 2023-11-28 PROCEDURE — 99999 PR PBB SHADOW E&M-EST. PATIENT-LVL IV: CPT | Mod: PBBFAC,,, | Performed by: DIETITIAN, REGISTERED

## 2023-11-28 PROCEDURE — G0108 PR DIAB MANAGE TRN  PER INDIV: ICD-10-PCS | Mod: S$GLB,,, | Performed by: DIETITIAN, REGISTERED

## 2023-11-28 PROCEDURE — 2022F DILAT RTA XM EVC RTNOPTHY: CPT | Mod: CPTII,S$GLB,, | Performed by: OPTOMETRIST

## 2023-11-28 PROCEDURE — 92015 PR REFRACTION: ICD-10-PCS | Mod: S$GLB,,, | Performed by: OPTOMETRIST

## 2023-11-28 NOTE — PROGRESS NOTES
HPI     Diabetic Eye Exam            Comments: Latanoprost QHS OU   Pt states for the past month he can see something like hair hanging in [facility]   of OS. Blocks [facility]  Diagnosed with diabetes about 15+ years  Blood sugar 152 currently  Lab Results       Component                Value               Date                       HGBA1C                   6.4 (H)             09/29/2023            Vision changes since last eye exam?: yes     Any eye pain today: no    Other ocular symptoms: floater    Interested in contact lens fitting today? no                              Comments    Last saw DNL 5/2020     1. DM with BDR and ME   2. Cataracts OU   3. COAG OU  Gcl: 10/2022 --- 28/33    Simbrinza BID OU (never started)  Latanoprost qd OU            Last edited by Sugar Fletcher on 11/28/2023 10:07 AM.            Assessment /Plan     For exam results, see Encounter Report.    Mild nonproliferative diabetic retinopathy of both eyes without macular edema associated with type 2 diabetes mellitus  PT lost to f/u  MGM poag pt  Primary care referred pt today for diabetic eye exam  Mild BDR OD, OS  No mac edema    Discussed importance of blood sugar control and regular dilated eye exams.   Continue close care with PCP regarding diabetes.    Primary open angle glaucoma (POAG) of both eyes, moderate stage  IOP stable today and within acceptable range  Overall stable GCL today OU  Continue current treatment  Monitor 3 months    Continue Latanoprost qhs OU    Nuclear sclerosis, bilateral  Cortical age-related cataract of both eyes  Diabetic cataract  Visually significant cataract.  Patient is at the option stage.   Cataract surgery will improve vision, but necessity is dependent on patient's symptoms.   Pt to discuss with Dr Mitchell at next visit  Will continue to monitor over the next 12 months. Pt to call or RTC with any significant change in vision prior to next visit.    Hyperopia with astigmatism and presbyopia, bilateral  Eyeglass Final  Rx       Eyeglass Final Rx         Sphere Cylinder Axis Add    Right +1.00 +0.25 025 +2.50    Left +1.00 +0.75 165 +2.50      Expiration Date: 11/28/2024                    RTC 3 months with MGM for 24-2VF, gOCT and IOP check or PRN  Discussed above and all questions were answered.

## 2023-11-28 NOTE — TELEPHONE ENCOUNTER
Called patient to reschedule Podiatry appointment on  11/28/23 due to book out. No answer, no vm to leave a message appointment will be canceled.

## 2023-11-28 NOTE — PROGRESS NOTES
Diabetes Care Specialist Progress Note  Author: Debbie Escobar RD, CDE  Date: 11/28/2023    Program Intake  Reason for Diabetes Program Visit:: Intervention (DM referral 7/14/23, 10/6/23 Eduardo Castillo, PAClementC)  Type of Intervention:: Individual  Education: Nutrition and Meal Planning  Device Training: Personal CGM (Dexcom G6 follow-up)  Current diabetes risk level:: high  Permission to speak with others about care:: no    Lab Results   Component Value Date    HGBA1C 6.4 (H) 09/29/2023       Clinical    Weight: 89.3 kg (196 lb 13.9 oz)       Body mass index is 32.76 kg/m².    Problem Review  Active comorbidities affecting diabetes self-care.: yes (HTN, HLD, CAD, CHF, Retinopathy, Neuropathy, COPD, Obesity by BMI, SANDRITA, Schizoaffective ds, Substance abuse.)    Clinical Assessment  Current Diabetes Treatment: Oral Medication, Injectable, Insulin (Ozempic 2mg weekly. Jardiance 25mg 1tab daily. Novolog correction per s/s (pt reports using ~2x/d).)  Have you ever experienced hypoglycemia (low blood sugar)?: yes  In the last month, how often have you experienced low blood sugar?: once a week (Pt reports low BG episodes in am related to missed meals and treats using sugary osbaldo.)  Are you able to tell when your blood sugar is low?: Yes (Dexcom alerts)  What symptoms do you experience?: Shaky (Poor vision)  Have you ever been hospitalized because your blood sugar was too low?: no  How do you treat hypoglycemia (low blood sugar)?: 1/2 can soda/fruit juice, 5-6 pieces of hard candy  Have you ever experienced hyperglycemia (high blood sugar)?: no    Medication Information  How many days a week do you miss your medications?: Never  Do you use a pill box or medication chart to help you manage your medications?: Pill box (Pt reports home health nurse assists organizing medications.)  Do you sometimes have difficulty refilling your medications?: No  Medication adherence impacting ability to self-manage diabetes?:  No    Labs  Do you have regular lab work to monitor your medications?: Yes  Type of Regular Lab Work: A1c, Cholesterol, Microalbumin, BMP  Where do you get your labs drawn?: [facility]  Lab Compliance Barriers: No    Nutritional Status  Meal Plan 24 Hour Recall - Breakfast: reg anita aid  Meal Plan 24 Hour Recall - Lunch: ham sandwich (white), strawberries  Meal Plan 24 Hour Recall - Dinner: ham sandwich (white) OR turkey/ham, dressings (holiday leftovers)  Meal Plan 24 Hour Recall - Snack: no snacks; osbaldo: reg. anita aid  Change in appetite?: No  Recent Changes in Weight: Weight Gain (~11 lbs since 5/23)  Current nutritional status an area of need that is impacting patient's ability to self-manage diabetes?: Yes    Additional Social History    Support  Does anyone support you with your diabetes care?: yes (Pt lives with 87yo father.)  Who supports you?: self  Who takes you to your medical appointments?:  (uses medical transportation)  Is Support an area impacting ability to self-manage diabetes?: No    Access to Mass Media & Technology  Does the patient have access to any of the following devices or technologies?: Smart phone  Media or technology needs impacting ability to self-manage diabetes?: No    Cognitive/Behavioral Health  Alert and Oriented: Yes  Difficulty Thinking: No  Requires Prompting: Yes  Requires assistance for routine expression?: No  Cognitive or behavioral barriers impacting ability to self-manage diabetes?: No    Culture/Jain  Culture or Faith beliefs that may impact ability to access healthcare: No    Communication  Language preference: English  Hearing Problems: No  Vision Problems: Yes  Vision problem type:: Decreased Vision  Vision Assistance: Glasses  Communication needs impacting ability to self-manage diabetes?: No    Health Literacy  Preferred Learning Method: Face to Face, Demonstration, Reading Materials  How often do you need to have someone help you read instructions, pamphlets, or  written material from your doctor or pharmacy?: Often (reading/spelling ~3rd-4th grade level)  Health literacy needs impacting ability to self-manage diabetes?: No      Diabetes Self-Management Skills Assessment    Diabetes Disease Process/Treatment Options  Diabetes Disease Process/Treatment Options: Skills Assessment Completed: No  Deferred due to:: Time  Area of need?: Deferred    Nutrition/Healthy Eating  Challenges to healthy eating:: portion control (Irregular meal patterns, sugary osbaldo. Pt reports using food bank, CO[employer] meals, commodity foods. He depends on transportaion assistance.)  Method of carbohydrate measurement:: no method  Patient can identify foods that impact blood sugar.: no (see comments)  Nutrition/Healthy Eating Skills Assessment Completed:: Yes  Assessment indicates:: Knowledge deficit, Instruction Needed  Area of need?: Yes    Physical Activity/Exercise  Patient's daily activity level::  (Pt in clinic today using walker.)  Physical Activity/Exercise Skills Assessment Completed: : No  Deffered due to:: Time  Area of need?: Deferred    Medications  Patient is able to describe current diabetes management routine.: yes  Diabetes management routine:: injectable medications, insulin, oral medications  Patient is able to identify current diabetes medications, dosages, and appropriate timing of medications.: yes  Patient understands the purpose of the medications taken for diabetes.: no  Patient reports problems or concerns with current medication regimen.: yes  Medication regimen problems/concerns:: concerned about side effects  Medication Skills Assessment Completed:: Yes  Assessment indicates:: Adequate understanding  Area of need?: Yes    Home Blood Glucose Monitoring  Patient states that blood sugar is checked at home daily.: yes  Monitoring Method:: personal continuous glucose monitor  Personal CGM type:: Dexcom G6  Patient is able to use personal CGM appropriately.: yes  CGM Report reviewed?: no  (Dexcom report isn't pulling correct dates. Pt plans to upgrade phone and notes Dexcom G7 supplies pending.)  Home Blood Glucose Monitoring Skills Assessment Completed: : Yes  Assessment indicates:: Adequate understanding  Area of need?: Yes    Acute Complications  Acute Complications Skills Assessment Completed: : No  Deffered due to:: Time  Area of need?: Deferred    Chronic Complications  Chronic Complications Skills Assessment Completed: : No  Deferred due to:: Time  Area of need?: Deferred    Psychosocial/Coping  Psychosocial/Coping Skills Assessment Completed: : No  Deffered due to:: Time  Area of need?: Deferred      Assessment Summary and Plan  Based on today's diabetes care assessment, the following areas of need were identified:          11/28/2023    12:07 AM   Social   Support No   Access to Mass Media/Tech No   Cognitive/Behavioral Health No   Culture/Restorationist No   Communication No   Health Literacy No          11/28/2023    12:07 AM   Clinical   Medication Adherence No   Lab Compliance No   Nutritional Status Yes - see care plan          11/28/2023    12:07 AM   Diabetes Self-Management Skills   Diabetes Disease Process/Treatment Options Deferred   Nutrition/Healthy Eating Yes - see care plan   Physical Activity/Exercise Deferred   Medication Yes - see care plan   Home Blood Glucose Monitoring Yes - see care plan   Acute Complications Deferred   Chronic Complications Deferred   Psychosocial/Coping Deferred      Today's interventions were provided through individual discussion, instruction, and written materials were provided.    Patient verbalized understanding of instruction and written materials.  Pt was able to return back demonstration of instructions today. Patient understood key points, needs reinforcement and further instruction.     Diabetes Self-Management Care Plan:  Today's Diabetes Self-Management Care Plan was developed with Crow's input. Crow has agreed to work toward the following  goal(s) to improve his/her overall diabetes control.      Care Plan: Diabetes Management   Updates made since 10/29/2023 12:00 AM        Problem: Blood Glucose Self-Monitoring         Goal: Patient agrees to monitor blood glucose using personal Dexcom G6.    Start Date: 7/20/2023   Expected End Date: 7/14/2024   This Visit's Progress: On track   Priority: High   Barriers: No Barriers Identified   Note:    Encouraged progress. Pt's Dexcom G6 brittnee is working well; however, Clarity isn't linking correct dates. Attempted to correct; however, original account made by diabetes care specialist no longer working and cannot verify pt's email today in clinic. He states home health nurses recently assisted with phone settings. Pt planning to obtain new phone and waiting on Dexcom G7 supplies. He deferred additional troubleshooting for follow-up visit and G7 training.     [employer] Account:  [email or web address]  [email or web address]     Dexcom Account:  [email or web address]  [email or web address]           Problem: Healthy Eating         Goal: Eat 3 meals daily - manage carb 30-45grams/meal, 5-15grams/snack.    Start Date: 11/28/2023   Expected End Date: 7/14/2024   Priority: Medium   Barriers: Financial; Transportation   Note:    Pt dislikes diet osbaldo but agrees to reduce/avoid sugary osbaldo and switch to water. Discussed role meal spacing, carb/pro balance on BG stability and strategies. Discussed budget meals and ways to increase fruits/veggies, lean proteins using healthy freezer and pantry supplies.        Task: Reviewed the sources and role of Carbohydrate, Protein, and Fat and how each nutrient impacts blood sugar. Completed 11/28/2023        Task: Recommended replacing beverages containing high sugar content with noncaloric/sugar free options and/or water. Completed 11/28/2023        Task: Review the importance of balancing carbohydrates with each meal using portion control techniques to count servings of carbohydrate and label  reading to identify serving size and amount of total carbs per serving. Completed 11/28/2023        Problem: Medications         Goal: Patient Agrees to take Diabetes Medication(s) as prescribed.    Start Date: 11/28/2023   Expected End Date: 7/14/2024   Priority: Low   Barriers: No Barriers Identified        Task: Reviewed with patient all current diabetes medications and provided basic review of the purpose, dosage, frequency, side effects, and storage of both oral and injectable diabetes medications. Completed 11/28/2023        Task: Discussed guidelines for preventing, detecting and treating hypoglycemia and hyperglycemia and reviewed the importance of meal and medication timing with diabetes mediations for prevention of hypoglycemia and maximum drug benefit. Completed 11/28/2023          Follow Up Plan   Follow up in about 2 weeks (around 12/12/2023).  -Dexcom G7 training  -eval goals    Today's care plan and follow up schedule was discussed with patient.  Crow verbalized understanding of the care plan, goals, and agrees to follow up plan.        The patient was encouraged to communicate with his/her health care provider/physician and care team regarding his/her condition(s) and treatment.  I provided the patient with my contact information today and encouraged to contact me via phone or [facility]'s Patient Portal as needed.     Length of Visit   Total Time: 60 Minutes

## 2023-12-02 ENCOUNTER — DOCUMENT SCAN (OUTPATIENT)
Dept: HOME HEALTH SERVICES | Facility: HOSPITAL | Age: 68
End: 2023-12-02
Payer: MEDICARE

## 2023-12-04 ENCOUNTER — DOCUMENT SCAN (OUTPATIENT)
Dept: HOME HEALTH SERVICES | Facility: HOSPITAL | Age: 68
End: 2023-12-04
Payer: MEDICARE

## 2023-12-05 ENCOUNTER — TELEPHONE (OUTPATIENT)
Dept: INTERNAL MEDICINE | Facility: CLINIC | Age: 68
End: 2023-12-05
Payer: MEDICARE

## 2023-12-05 NOTE — TELEPHONE ENCOUNTER
----- Message from Alba Birmingham sent at 12/5/2023 12:37 PM CST -----  Contact: Crow Breen is requesting a call back from nurse concerning falling twice,  please call patient back at [phone]           Thanks

## 2023-12-05 NOTE — TELEPHONE ENCOUNTER
"Unable to LM. Automated message states "the person you are trying to reach is not accepting calls at this time."//ddw  "

## 2023-12-06 RX ORDER — TRAMADOL HYDROCHLORIDE 50 MG/1
50 TABLET ORAL
OUTPATIENT
Start: 2023-12-06

## 2023-12-06 NOTE — TELEPHONE ENCOUNTER
----- Message from Irma Reynolds sent at 12/6/2023 11:26 AM CST -----  Contact: Malu- Home health nurse  Malu is requesting a refill on the pt traMADoL (ULTRAM) 50 mg tablet medication. The pt preferred pharmacy is listed below.  She can be reached @ [phone].  [id number] - [place] - [address] AT [facility]  [address] [place] [address]  Phone: [phone] Fax: [phone]

## 2023-12-06 NOTE — TELEPHONE ENCOUNTER
No care due was identified.  Health [facility] Embedded Care Due Messages. Reference number: [id number].   12/06/2023 11:40:07 AM CST

## 2023-12-12 RX ORDER — TRAZODONE HYDROCHLORIDE 100 MG/1
TABLET ORAL
Qty: 180 TABLET | Refills: 3 | Status: SHIPPED | OUTPATIENT
Start: 2023-12-12

## 2023-12-12 NOTE — TELEPHONE ENCOUNTER
Refill Decision Note   Crow Norma  is requesting a refill authorization.  Brief Assessment and Rationale for Refill:  Approve     Medication Therapy Plan:         Comments:     Note composed:2:00 PM 12/12/2023

## 2023-12-12 NOTE — TELEPHONE ENCOUNTER
No care due was identified.  [facility] Embedded Care Due Messages. Reference number: [id number].   12/12/2023 12:54:54 PM CST

## 2023-12-13 ENCOUNTER — OFFICE VISIT (OUTPATIENT)
Dept: RHEUMATOLOGY | Facility: CLINIC | Age: 68
End: 2023-12-13
Payer: MEDICARE

## 2023-12-13 ENCOUNTER — LAB VISIT (OUTPATIENT)
Dept: LAB | Facility: HOSPITAL | Age: 68
End: 2023-12-13
Attending: FAMILY MEDICINE
Payer: MEDICARE

## 2023-12-13 ENCOUNTER — CLINICAL SUPPORT (OUTPATIENT)
Dept: DIABETES | Facility: CLINIC | Age: 68
End: 2023-12-13
Payer: MEDICARE

## 2023-12-13 VITALS
BODY MASS INDEX: 32.83 KG/M2 | HEIGHT: 65 IN | SYSTOLIC BLOOD PRESSURE: 113 MMHG | WEIGHT: 197.06 LBS | DIASTOLIC BLOOD PRESSURE: 68 MMHG | HEART RATE: 80 BPM

## 2023-12-13 DIAGNOSIS — R76.8 ELEVATED IGE LEVEL: ICD-10-CM

## 2023-12-13 DIAGNOSIS — E11.65 UNCONTROLLED TYPE 2 DIABETES MELLITUS WITH HYPERGLYCEMIA: Primary | ICD-10-CM

## 2023-12-13 DIAGNOSIS — M33.13 DERMATOMYOSITIS: ICD-10-CM

## 2023-12-13 DIAGNOSIS — M33.13 DERMATOMYOSITIS: Primary | ICD-10-CM

## 2023-12-13 LAB
CCP AB SER IA-ACNC: 1 U/ML
CK SERPL-CCNC: 331 U/L (ref 20–200)
CRP SERPL-MCNC: 9.6 MG/L (ref 0–8.2)
ERYTHROCYTE [SEDIMENTATION RATE] IN BLOOD BY PHOTOMETRIC METHOD: 117 MM/HR (ref 0–23)
IGA SERPL-MCNC: 763 MG/DL (ref 40–350)
IGE SERPL-ACNC: 1717 IU/ML (ref 0–100)
IGG SERPL-MCNC: 1566 MG/DL (ref 650–1600)
IGM SERPL-MCNC: 93 MG/DL (ref 50–300)
RHEUMATOID FACT SERPL-ACNC: <13 IU/ML (ref 0–15)

## 2023-12-13 PROCEDURE — 3044F PR MOST RECENT HEMOGLOBIN A1C LEVEL <7.0%: ICD-10-PCS | Mod: CPTII,S$GLB,, | Performed by: INTERNAL MEDICINE

## 2023-12-13 PROCEDURE — 99999 PR PBB SHADOW E&M-EST. PATIENT-LVL III: CPT | Mod: PBBFAC,,, | Performed by: DIETITIAN, REGISTERED

## 2023-12-13 PROCEDURE — 3078F PR MOST RECENT DIASTOLIC BLOOD PRESSURE < 80 MM HG: ICD-10-PCS | Mod: CPTII,S$GLB,, | Performed by: INTERNAL MEDICINE

## 2023-12-13 PROCEDURE — 3008F BODY MASS INDEX DOCD: CPT | Mod: CPTII,S$GLB,, | Performed by: INTERNAL MEDICINE

## 2023-12-13 PROCEDURE — 85652 RBC SED RATE AUTOMATED: CPT | Performed by: INTERNAL MEDICINE

## 2023-12-13 PROCEDURE — 1125F PR PAIN SEVERITY QUANTIFIED, PAIN PRESENT: ICD-10-PCS | Mod: CPTII,S$GLB,, | Performed by: INTERNAL MEDICINE

## 2023-12-13 PROCEDURE — 86334 IMMUNOFIX E-PHORESIS SERUM: CPT | Mod: 26,,, | Performed by: PATHOLOGY

## 2023-12-13 PROCEDURE — G0108 DIAB MANAGE TRN  PER INDIV: HCPCS | Mod: S$GLB,,, | Performed by: DIETITIAN, REGISTERED

## 2023-12-13 PROCEDURE — 99999 PR PBB SHADOW E&M-EST. PATIENT-LVL V: ICD-10-PCS | Mod: PBBFAC,,, | Performed by: INTERNAL MEDICINE

## 2023-12-13 PROCEDURE — 3008F PR BODY MASS INDEX (BMI) DOCUMENTED: ICD-10-PCS | Mod: CPTII,S$GLB,, | Performed by: INTERNAL MEDICINE

## 2023-12-13 PROCEDURE — 4010F ACE/ARB THERAPY RXD/TAKEN: CPT | Mod: CPTII,S$GLB,, | Performed by: INTERNAL MEDICINE

## 2023-12-13 PROCEDURE — 86140 C-REACTIVE PROTEIN: CPT | Performed by: INTERNAL MEDICINE

## 2023-12-13 PROCEDURE — 86431 RHEUMATOID FACTOR QUANT: CPT | Performed by: INTERNAL MEDICINE

## 2023-12-13 PROCEDURE — 83516 IMMUNOASSAY NONANTIBODY: CPT | Performed by: INTERNAL MEDICINE

## 2023-12-13 PROCEDURE — 3078F DIAST BP <80 MM HG: CPT | Mod: CPTII,S$GLB,, | Performed by: INTERNAL MEDICINE

## 2023-12-13 PROCEDURE — 86334 PATHOLOGIST INTERPRETATION IFE: ICD-10-PCS | Mod: 26,,, | Performed by: PATHOLOGY

## 2023-12-13 PROCEDURE — 86200 CCP ANTIBODY: CPT | Performed by: INTERNAL MEDICINE

## 2023-12-13 PROCEDURE — 83529 ASAY OF INTERLEUKIN-6 (IL-6): CPT | Performed by: INTERNAL MEDICINE

## 2023-12-13 PROCEDURE — 99999 PR PBB SHADOW E&M-EST. PATIENT-LVL V: CPT | Mod: PBBFAC,,, | Performed by: INTERNAL MEDICINE

## 2023-12-13 PROCEDURE — G0108 PR DIAB MANAGE TRN  PER INDIV: ICD-10-PCS | Mod: S$GLB,,, | Performed by: DIETITIAN, REGISTERED

## 2023-12-13 PROCEDURE — 86334 IMMUNOFIX E-PHORESIS SERUM: CPT | Performed by: INTERNAL MEDICINE

## 2023-12-13 PROCEDURE — 84165 PATHOLOGIST INTERPRETATION SPE: ICD-10-PCS | Mod: 26,,, | Performed by: PATHOLOGY

## 2023-12-13 PROCEDURE — 3066F PR DOCUMENTATION OF TREATMENT FOR NEPHROPATHY: ICD-10-PCS | Mod: CPTII,S$GLB,, | Performed by: INTERNAL MEDICINE

## 2023-12-13 PROCEDURE — 36415 COLL VENOUS BLD VENIPUNCTURE: CPT | Performed by: INTERNAL MEDICINE

## 2023-12-13 PROCEDURE — 82550 ASSAY OF CK (CPK): CPT | Performed by: INTERNAL MEDICINE

## 2023-12-13 PROCEDURE — 3074F PR MOST RECENT SYSTOLIC BLOOD PRESSURE < 130 MM HG: ICD-10-PCS | Mod: CPTII,S$GLB,, | Performed by: INTERNAL MEDICINE

## 2023-12-13 PROCEDURE — 99205 OFFICE O/P NEW HI 60 MIN: CPT | Mod: S$GLB,,, | Performed by: INTERNAL MEDICINE

## 2023-12-13 PROCEDURE — 84165 PROTEIN E-PHORESIS SERUM: CPT | Mod: 26,,, | Performed by: PATHOLOGY

## 2023-12-13 PROCEDURE — 99999 PR PBB SHADOW E&M-EST. PATIENT-LVL III: ICD-10-PCS | Mod: PBBFAC,,, | Performed by: DIETITIAN, REGISTERED

## 2023-12-13 PROCEDURE — 84165 PROTEIN E-PHORESIS SERUM: CPT | Performed by: INTERNAL MEDICINE

## 2023-12-13 PROCEDURE — 86036 ANCA SCREEN EACH ANTIBODY: CPT | Performed by: INTERNAL MEDICINE

## 2023-12-13 PROCEDURE — 3062F PR POS MACROALBUMINURIA RESULT DOCUMENTED/REVIEW: ICD-10-PCS | Mod: CPTII,S$GLB,, | Performed by: INTERNAL MEDICINE

## 2023-12-13 PROCEDURE — 3044F HG A1C LEVEL LT 7.0%: CPT | Mod: CPTII,S$GLB,, | Performed by: INTERNAL MEDICINE

## 2023-12-13 PROCEDURE — 3074F SYST BP LT 130 MM HG: CPT | Mod: CPTII,S$GLB,, | Performed by: INTERNAL MEDICINE

## 2023-12-13 PROCEDURE — 82784 ASSAY IGA/IGD/IGG/IGM EACH: CPT | Mod: 59 | Performed by: INTERNAL MEDICINE

## 2023-12-13 PROCEDURE — 83516 IMMUNOASSAY NONANTIBODY: CPT | Mod: 59 | Performed by: INTERNAL MEDICINE

## 2023-12-13 PROCEDURE — 99205 PR OFFICE/OUTPT VISIT, NEW, LEVL V, 60-74 MIN: ICD-10-PCS | Mod: S$GLB,,, | Performed by: INTERNAL MEDICINE

## 2023-12-13 PROCEDURE — 82085 ASSAY OF ALDOLASE: CPT | Performed by: INTERNAL MEDICINE

## 2023-12-13 PROCEDURE — 4010F PR ACE/ARB THEARPY RXD/TAKEN: ICD-10-PCS | Mod: CPTII,S$GLB,, | Performed by: INTERNAL MEDICINE

## 2023-12-13 PROCEDURE — 3062F POS MACROALBUMINURIA REV: CPT | Mod: CPTII,S$GLB,, | Performed by: INTERNAL MEDICINE

## 2023-12-13 PROCEDURE — 82785 ASSAY OF IGE: CPT | Performed by: INTERNAL MEDICINE

## 2023-12-13 PROCEDURE — 3066F NEPHROPATHY DOC TX: CPT | Mod: CPTII,S$GLB,, | Performed by: INTERNAL MEDICINE

## 2023-12-13 PROCEDURE — 1125F AMNT PAIN NOTED PAIN PRSNT: CPT | Mod: CPTII,S$GLB,, | Performed by: INTERNAL MEDICINE

## 2023-12-13 NOTE — PROGRESS NOTES
RHEUMATOLOGY CLINIC INITIAL VISIT    Reason for consult:-  Dermatomyositis  Chief complaints, HPI, ROS, EXAM, Assessment & Plans:-  Crow Steel a 68 y.o. pleasant male comes in with dermatomyositis.  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  He also has longstanding history of unexplained elevated inflammatory markers.  He has not taken Imuran for the past 2 years.  He denies any flare of dermatomyositis in his skin.  Severe itching all over the body associated with dryness and severe allergies.  Was noted to have significantly elevated IgE level back in April by allergy specialist.  Message was sent to him via [employer] from allergies specialist to start anti IgE therapy.  He has never read the message and have not received the medication.  Severe cervical and lumbar degenerative disc disease associated with chronic pain syndrome.  Rheumatological review of system negative otherwise.  Exam shows no active skin rash to suspect recurrence of dermatomyositis on the skin.  Muscle strength examination confounded by joint pain and stiffness.    1. Dermatomyositis    2. Elevated IgE level      Problem List Items Addressed This Visit       Dermatomyositis - Primary    Relevant Orders    Rheumatoid Factor    Cyclic Citrullinated Peptide Antibody, IgG    CK    Aldolase    Immunoglobulins (IgG, IgA, IgM) Quantitative    Myeloperoxidase Antibody (MPO)    Proteinase 3 Autoantibodies    Anti-Neutrophilic Cytoplasmic Antibody    Elevated IgE level    Relevant Orders    IGE      Latest Reference Range & Units Most Recent   AMOL Screen Negative <1:160  Negative <1:160  6/25/19 10:50   IgG 650 - 1600 mg/dL 1202  8/29/17 12:40   IgM 50 - 300 mg/dL 55  8/29/17 12:40   IgA 40 - 350 mg/dL 643 (H)  8/29/17 12:40   IgE 0 - 100 IU/mL 1343 (H)  4/20/23 10:52   Protein, Serum 6.0 - 8.4 g/dL 7.5  6/27/19 15:04   Albumin grams/dl 3.35 - 5.55 g/dL  3.84  6/27/19 15:04   Alpha-1 grams/dl 0.17 - 0.41 g/dL 0.30  6/27/19 15:04   Alpha-2 0.43 - 0.99 g/dL 0.91  6/27/19 15:04   Beta 0.50 - 1.10 g/dL 1.03  6/27/19 15:04   Gamma 0.67 - 1.58 g/dL 1.43  6/27/19 15:04   Pathologist Interpretation SPE  REVIEWED  6/27/19 15:04   Pathologist Interpretation DARSHANA  REVIEWED  6/27/19 15:04   Immunofix Interp.  SEE COMMENT  6/27/19 15:04   Anti-Josie-1 Antibody <20 Units <20  6/27/19 15:04   Anti-PM/Scl Ab <20 Units <20  6/27/19 15:04   Anti-SS-A 52 kD Ab, IgG <20 Units <20  6/27/19 15:04   Anti-U1-RNP  Ab <20 Units <20  6/27/19 15:04   EJ Negative  Negative  6/27/19 15:04   Fibrillarin (U3 RNP) Negative  Negative  6/27/19 15:04   Ku Negative  Negative  6/27/19 15:04   MDA-5 (P140) (CADM-140) <20 Units <20  6/27/19 15:04   MI-2 Negative  Negative  6/27/19 15:04   NXP-2 (P140) <20 Units <20  6/27/19 15:04   OJ Negative  Negative  6/27/19 15:04   PL-12 Negative  Negative  6/27/19 15:04   PL-7 Negative  Negative  6/27/19 15:04   SRP Negative  Negative  6/27/19 15:04   TIF1 GAMMA (P155/140) <20 Units <20  6/27/19 15:04   U2 snRNP Negative  Negative  6/27/19 15:04   (H): Data is abnormally high      History of severe 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  Have not been on any therapy for the past 2 years.  No recurrence of dermatomyositis rash in the past 2 years.  No active rash today.  No indication to restart immunosuppressive therapy-Imuran at this time.  Check inflammatory markers and serologies.  Repeat IgE levels since his IgE level were significantly high in April.  Sending this note to our allergies specialist.  Patient would like us to contact him via phone instead of MyChart messages since he does not use [employer].  I have explained all of the above in detail and the patient understands all of the current recommendation(s). I have answered all questions to the best of my ability and to their complete satisfaction.     Total time spent 60  minutes including time needed to review the records, the   patient evaluation, documentation, face-to-face discussion with the patient,    coordination of care and counseling.    Level V visit.      # Follow up in about 6 months (around 6/13/2024).      Past Medical History:   Diagnosis Date    Arthritis     Asthma     Atrial fibrillation     Atrial fibrillation with RVR 08/07/2019    Carotid artery stenosis     CHF (congestive heart failure)     Chronic obstructive pulmonary disease 08/05/2020    Depression     Dermatomyositis     Diabetes mellitus, type 2 Diagnosed in 2000    Elevated PSA     Follow up 07/30/2020    Hypertension     Myocardial infarction     2017    Sleep apnea        Past Surgical History:   Procedure Laterality Date    ABLATION OF ARRHYTHMOGENIC FOCUS FOR ATRIAL FIBRILLATION N/A 2/27/2020    Procedure: Ablation atrial fibrillation;  Surgeon: Gio Brown MD;  Location: [facility] EP LAB;  Service: Cardiology;  Laterality: N/A;  afib, DAMIEN (cx if SR), PVI, PITO, anes, MB, 3 Prep    CHOLECYSTECTOMY      CLOSURE OF WOUND Left 7/1/2020    Procedure: CLOSURE, WOUND;  Surgeon: Barb Veras DPM;  Location: [facility] OR;  Service: Podiatry;  Laterality: Left;    COLONOSCOPY N/A 3/4/2022    Procedure: COLONOSCOPY;  Surgeon: Yolis Freitas MD;  Location: [facility] ENDO;  Service: Endoscopy;  Laterality: N/A;    ESOPHAGOGASTRODUODENOSCOPY N/A 3/4/2022    Procedure: EGD (ESOPHAGOGASTRODUODENOSCOPY);  Surgeon: Yolis Freitas MD;  Location: [facility] ENDO;  Service: Endoscopy;  Laterality: N/A;    INJECTION OF ANESTHETIC AGENT INTO SACROILIAC JOINT Left 3/24/2021    Procedure: Left BLOCK, SACROILIAC JOINT and bilateral rhomboid TPI;  Surgeon: Dillan Tsai MD;  Location: [facility] PAIN MGT;  Service: Pain Management;  Laterality: Left;    INTRALUMINAL GASTROINTESTINAL TRACT IMAGING VIA CAPSULE N/A 3/22/2022    Procedure: IMAGING PROCEDURE, GI TRACT, INTRALUMINAL, VIA CAPSULE;  Surgeon: Justice Martinez RN;  Location:  [facility] ENDO;  Service: Endoscopy;  Laterality: N/A;    REVERSE TOTAL SHOULDER ARTHROPLASTY Left 1/10/2022    Procedure: ARTHROPLASTY, SHOULDER, TOTAL, REVERSE;  Surgeon: Anthony Alvarado MD;  Location: [facility] OR;  Service: Orthopedics;  Laterality: Left;  left reverse total shoulder arthroplasty     SELECTIVE INJECTION OF ANESTHETIC AGENT AROUND LUMBAR SPINAL NERVE ROOT BY TRANSFORAMINAL APPROACH Left 6/11/2020    Procedure: BLOCK, SPINAL NERVE ROOT, LUMBAR, SELECTIVE, TRANSFORAMINAL APPROACH Left L4-5, L5-S1 TFESI with RN IV sedation;  Surgeon: Dillan Tsai MD;  Location: [facility] PAIN MGT;  Service: Pain Management;  Laterality: Left;    TOE AMPUTATION Left 6/29/2020    Procedure: AMPUTATION, TOE;  Surgeon: Barb Veras DPM;  Location: [facility] OR;  Service: Podiatry;  Laterality: Left;  great toe        Social History     Tobacco Use    Smoking status: Never    Smokeless tobacco: Never   Substance Use Topics    Alcohol use: Yes     Comment: rare, maybe holidays    Drug use: Not Currently     Types: Cocaine       Family History   Problem Relation Age of Onset    Hypertension Mother     Glaucoma Mother     Cataracts Mother     Diabetes Mother     Cataracts Father     Stroke Father     Glaucoma Sister         x3    Cataracts Sister         x3    Diabetes Sister         x1    Stroke Sister         x1    No Known Problems Brother     No Known Problems Daughter     No Known Problems Son     Glaucoma Maternal Aunt     Diabetes Maternal Aunt     Cancer Maternal Grandfather         lung (smoker)    Prostate cancer Neg Hx     Heart disease Neg Hx     Kidney disease Neg Hx        Review of patient's allergies indicates:   Allergen Reactions    Protamine Hives     Urticaria, possible upper airway swelling       Medication List with Changes/Refills   Current Medications    ALBUTEROL (PROVENTIL) 2.5 MG /3 ML (0.083 %) NEBULIZER SOLUTION    INHALE THE CONTENTS OF 1 VIAL VIA NEBULIZER EVERY 4 TO 6 HOURS AS NEEDED FOR WHEEZING OR  FOR SHORTNESS OF BREATH    ALLOPURINOL (ZYLOPRIM) 100 MG TABLET    Take 1 tablet (100 mg total) by mouth once daily.    AMLODIPINE (NORVASC) 2.5 MG TABLET    Take 1 tablet (2.5 mg total) by mouth once daily.    APIXABAN (ELIQUIS) 5 MG TAB    TAKE 1 TABLET TWICE DAILY    ATORVASTATIN (LIPITOR) 40 MG TABLET    Take 1 tablet (40 mg total) by mouth every evening.    BLOOD SUGAR DIAGNOSTIC STRP    1 each by Misc.(Non-Drug; Combo Route) route 4 (four) times daily.    BLOOD-GLUCOSE METER MISC    Use as instructed to test blood glucose meter daily.    BRINZOLAMIDE-BRIMONIDINE (SIMBRINZA) 1-0.2 % DRPS    Place 1 drop into both eyes 3 (three) times daily.    EMPAGLIFLOZIN (JARDIANCE) 25 MG TABLET    Take 1 tablet (25 mg total) by mouth once daily.    EPINEPHRINE (EPIPEN) 0.3 MG/0.3 ML ATIN    Inject 0.3 mLs (0.3 mg total) into the muscle once. for 1 dose    FLUTICASONE-UMECLIDIN-VILANTER (TRELEGY ELLIPTA) 200-62.5-25 MCG INHALER    Inhale 1 puff into the lungs once daily.    FUROSEMIDE (LASIX) 40 MG TABLET    Take 2 tablets (80 mg total) by mouth 2 (two) times a day.    GABAPENTIN (NEURONTIN) 800 MG TABLET    TAKE 1 TABLET(800 MG) BY MOUTH THREE TIMES DAILY    HYDROCODONE-ACETAMINOPHEN (NORCO)  MG PER TABLET    Take 1 tablet by mouth every 4 (four) hours as needed for Pain.    INSULIN ASPART U-100 (NOVOLOG FLEXPEN U-100 INSULIN) 100 UNIT/ML (3 ML) INPN PEN    INJECT 10 UNITS UNDER THE SKIN THREE TIMES DAILY WITH MEALS    LANCETS (TRUEPLUS LANCETS) 33 GAUGE MISC    Use to check blood sugar twice a day    LATANOPROST 0.005 % OPHTHALMIC SOLUTION    INSTILL 1 DROP INTO BOTH EYES ONE TIME DAILY BOTTLE EXPIRES 42 DAYS AFTER OPENING...    LISINOPRIL (PRINIVIL,ZESTRIL) 30 MG TABLET    TAKE 1 TABLET EVERY DAY.    LISINOPRIL 10 MG TABLET    TAKE 1 TABLET EVERY DAY    NAPROXEN (NAPROSYN) 500 MG TABLET    Take 1 tablet (500 mg total) by mouth 2 (two) times daily with meals.    OMALIZUMAB (XOLAIR) 150 MG/ML INJECTION    Inject 2 mLs  "(300 mg total) into the skin every 14 (fourteen) days.    OMALIZUMAB (XOLAIR) 75 MG/0.5 ML INJECTION    Inject 0.5 mLs (75 mg total) into the skin every 14 (fourteen) days.    OMEPRAZOLE (PRILOSEC) 20 MG CAPSULE    Take 20 mg by mouth every morning.    PANTOPRAZOLE (PROTONIX) 40 MG TABLET    Take 1 tablet (40 mg total) by mouth once daily.    PAPAVERINE-PHENTOLAMIN-ALPROST 150 MG-5 MG- 50 MCG SOLR    0.5 mLs by Intracavernosal route as needed (ED).    PEN NEEDLE, DIABETIC (BD ULTRA-FINE SHORT PEN NEEDLE) 31 GAUGE X 5/16" NDLE    1 each by Misc.(Non-Drug; Combo Route) route 3 (three) times daily.    PREDNISONE (DELTASONE) 50 MG TAB    Take 50 mg by mouth.    RANOLAZINE (RANEXA) 1,000 MG TB12    TAKE 1 TABLET TWICE DAILY    SEMAGLUTIDE (OZEMPIC) 2 MG/DOSE (8 MG/3 ML) PNIJ    Inject 2 mg into the skin every 7 days.    SERTRALINE (ZOLOFT) 100 MG TABLET    Take 1 tablet (100 mg total) by mouth every evening.    TAMSULOSIN (FLOMAX) 0.4 MG CAP    Take 1 capsule (0.4 mg total) by mouth once daily.    TRAMADOL (ULTRAM) 50 MG TABLET    Take 50 mg by mouth.    TRAZODONE (DESYREL) 100 MG TABLET    TAKE 2 TABLETS EVERY EVENING    VIOS AEROSOL DELIVERY SYSTEM VIN    USE AS DIRESTED         Thank you for allowing me to participate in the care ofCrow Green.    Disclaimer: This note was prepared using voice recognition system and is likely to have sound alike errors and is not proof read.  Please call me with any questions.        "

## 2023-12-14 ENCOUNTER — TELEPHONE (OUTPATIENT)
Dept: ALLERGY | Facility: CLINIC | Age: 68
End: 2023-12-14
Payer: MEDICARE

## 2023-12-14 ENCOUNTER — TELEPHONE (OUTPATIENT)
Dept: RHEUMATOLOGY | Facility: CLINIC | Age: 68
End: 2023-12-14
Payer: MEDICARE

## 2023-12-14 LAB
ALBUMIN SERPL ELPH-MCNC: 3.46 G/DL (ref 3.35–5.55)
ALPHA1 GLOB SERPL ELPH-MCNC: 0.34 G/DL (ref 0.17–0.41)
ALPHA2 GLOB SERPL ELPH-MCNC: 1.09 G/DL (ref 0.43–0.99)
B-GLOBULIN SERPL ELPH-MCNC: 1.23 G/DL (ref 0.5–1.1)
GAMMA GLOB SERPL ELPH-MCNC: 1.47 G/DL (ref 0.67–1.58)
INTERPRETATION SERPL IFE-IMP: NORMAL
PATHOLOGIST INTERPRETATION IFE: NORMAL
PATHOLOGIST INTERPRETATION SPE: NORMAL
PROT SERPL-MCNC: 7.6 G/DL (ref 6–8.4)

## 2023-12-14 NOTE — TELEPHONE ENCOUNTER
----- Message from Diamone Speed sent at 12/14/2023  1:46 PM CST -----  Regarding: self  Type:  Patient Returning Call         Who Called: self       Who Left Message for Patient: Karlie Lane MA      Does the patient know what this is regarding?: yes        Would the patient rather a call back or a response via My [facility]? Call back      Best Call Back Number: [phone]

## 2023-12-14 NOTE — TELEPHONE ENCOUNTER
----- Message from Josie Hernandez MD sent at 12/14/2023 11:08 AM CST -----  Hi,  Okay. Great. Thank you. We will get him in.  Josie

## 2023-12-14 NOTE — TELEPHONE ENCOUNTER
----- Message from Katy Dumont sent at 12/14/2023 10:08 AM CST -----  Patient is requesting a call back asap at [phone]

## 2023-12-15 LAB
ALDOLASE SERPL-CCNC: 4.6 U/L (ref 1.2–7.6)
IL6 SERPL-MCNC: 7.3 PG/ML
PROTEINASE3 IGG SER-ACNC: <0.2 U

## 2023-12-18 LAB
ANCA AB TITR SER IF: NORMAL TITER
MYELOPEROXIDASE AB SER-ACNC: <0.2 U/ML
P-ANCA TITR SER IF: NORMAL TITER

## 2023-12-19 DIAGNOSIS — M51.26 HNP (HERNIATED NUCLEUS PULPOSUS), LUMBAR: ICD-10-CM

## 2023-12-19 DIAGNOSIS — M47.26 OSTEOARTHRITIS OF SPINE WITH RADICULOPATHY, LUMBAR REGION: ICD-10-CM

## 2023-12-19 RX ORDER — GABAPENTIN 800 MG/1
TABLET ORAL
Qty: 270 TABLET | Refills: 0 | OUTPATIENT
Start: 2023-12-19

## 2023-12-19 NOTE — TELEPHONE ENCOUNTER
No care due was identified.  [facility] Embedded Care Due Messages. Reference number: [id number].   12/19/2023 1:47:30 PM CST

## 2023-12-20 ENCOUNTER — TELEPHONE (OUTPATIENT)
Dept: PODIATRY | Facility: CLINIC | Age: 68
End: 2023-12-20
Payer: MEDICARE

## 2023-12-20 NOTE — TELEPHONE ENCOUNTER
----- Message from Peggy Arteaga sent at 12/20/2023  8:49 AM CST -----  Regarding: Soon Appt  Contact: Thelma  Type:  Sooner Apoointment Request    Caller is requesting a sooner appointment.  Caller declined first available appointment listed below.  Caller will not accept being placed on the waitlist and is requesting a message be sent to doctor.  Name of Caller: thelma   When is the first available appointment? 01/2024  Symptoms: nail care   Would the patient rather a call back or a response via My [facility]? call  Best Call Back Number: [phone] (home)    Additional Information:  Thelma had to cancel 12/20/2023 and would like to be rescheduled before the next available.

## 2023-12-22 ENCOUNTER — OFFICE VISIT (OUTPATIENT)
Dept: ALLERGY | Facility: CLINIC | Age: 68
End: 2023-12-22
Payer: MEDICARE

## 2023-12-22 VITALS
WEIGHT: 199.31 LBS | HEIGHT: 65 IN | SYSTOLIC BLOOD PRESSURE: 133 MMHG | HEART RATE: 88 BPM | TEMPERATURE: 98 F | BODY MASS INDEX: 33.21 KG/M2 | DIASTOLIC BLOOD PRESSURE: 78 MMHG

## 2023-12-22 DIAGNOSIS — J30.89 ALLERGIC RHINITIS DUE TO AMERICAN HOUSE DUST MITE: Primary | ICD-10-CM

## 2023-12-22 DIAGNOSIS — J45.40 MODERATE PERSISTENT ASTHMA WITHOUT COMPLICATION: ICD-10-CM

## 2023-12-22 DIAGNOSIS — R76.8 ELEVATED IGE LEVEL: ICD-10-CM

## 2023-12-22 PROCEDURE — 3078F PR MOST RECENT DIASTOLIC BLOOD PRESSURE < 80 MM HG: ICD-10-PCS | Mod: CPTII,S$GLB,, | Performed by: ALLERGY & IMMUNOLOGY

## 2023-12-22 PROCEDURE — 3044F PR MOST RECENT HEMOGLOBIN A1C LEVEL <7.0%: ICD-10-PCS | Mod: CPTII,S$GLB,, | Performed by: ALLERGY & IMMUNOLOGY

## 2023-12-22 PROCEDURE — 1159F MED LIST DOCD IN RCRD: CPT | Mod: CPTII,S$GLB,, | Performed by: ALLERGY & IMMUNOLOGY

## 2023-12-22 PROCEDURE — 3008F BODY MASS INDEX DOCD: CPT | Mod: CPTII,S$GLB,, | Performed by: ALLERGY & IMMUNOLOGY

## 2023-12-22 PROCEDURE — 3062F POS MACROALBUMINURIA REV: CPT | Mod: CPTII,S$GLB,, | Performed by: ALLERGY & IMMUNOLOGY

## 2023-12-22 PROCEDURE — 3062F PR POS MACROALBUMINURIA RESULT DOCUMENTED/REVIEW: ICD-10-PCS | Mod: CPTII,S$GLB,, | Performed by: ALLERGY & IMMUNOLOGY

## 2023-12-22 PROCEDURE — 3066F NEPHROPATHY DOC TX: CPT | Mod: CPTII,S$GLB,, | Performed by: ALLERGY & IMMUNOLOGY

## 2023-12-22 PROCEDURE — 3008F PR BODY MASS INDEX (BMI) DOCUMENTED: ICD-10-PCS | Mod: CPTII,S$GLB,, | Performed by: ALLERGY & IMMUNOLOGY

## 2023-12-22 PROCEDURE — 99999 PR PBB SHADOW E&M-EST. PATIENT-LVL V: ICD-10-PCS | Mod: PBBFAC,,, | Performed by: ALLERGY & IMMUNOLOGY

## 2023-12-22 PROCEDURE — 99214 OFFICE O/P EST MOD 30 MIN: CPT | Mod: S$GLB,,, | Performed by: ALLERGY & IMMUNOLOGY

## 2023-12-22 PROCEDURE — 3066F PR DOCUMENTATION OF TREATMENT FOR NEPHROPATHY: ICD-10-PCS | Mod: CPTII,S$GLB,, | Performed by: ALLERGY & IMMUNOLOGY

## 2023-12-22 PROCEDURE — 1159F PR MEDICATION LIST DOCUMENTED IN MEDICAL RECORD: ICD-10-PCS | Mod: CPTII,S$GLB,, | Performed by: ALLERGY & IMMUNOLOGY

## 2023-12-22 PROCEDURE — 4010F PR ACE/ARB THEARPY RXD/TAKEN: ICD-10-PCS | Mod: CPTII,S$GLB,, | Performed by: ALLERGY & IMMUNOLOGY

## 2023-12-22 PROCEDURE — 99999 PR PBB SHADOW E&M-EST. PATIENT-LVL V: CPT | Mod: PBBFAC,,, | Performed by: ALLERGY & IMMUNOLOGY

## 2023-12-22 PROCEDURE — 4010F ACE/ARB THERAPY RXD/TAKEN: CPT | Mod: CPTII,S$GLB,, | Performed by: ALLERGY & IMMUNOLOGY

## 2023-12-22 PROCEDURE — 3075F PR MOST RECENT SYSTOLIC BLOOD PRESS GE 130-139MM HG: ICD-10-PCS | Mod: CPTII,S$GLB,, | Performed by: ALLERGY & IMMUNOLOGY

## 2023-12-22 PROCEDURE — 3075F SYST BP GE 130 - 139MM HG: CPT | Mod: CPTII,S$GLB,, | Performed by: ALLERGY & IMMUNOLOGY

## 2023-12-22 PROCEDURE — 1125F PR PAIN SEVERITY QUANTIFIED, PAIN PRESENT: ICD-10-PCS | Mod: CPTII,S$GLB,, | Performed by: ALLERGY & IMMUNOLOGY

## 2023-12-22 PROCEDURE — 1125F AMNT PAIN NOTED PAIN PRSNT: CPT | Mod: CPTII,S$GLB,, | Performed by: ALLERGY & IMMUNOLOGY

## 2023-12-22 PROCEDURE — 3078F DIAST BP <80 MM HG: CPT | Mod: CPTII,S$GLB,, | Performed by: ALLERGY & IMMUNOLOGY

## 2023-12-22 PROCEDURE — 99214 PR OFFICE/OUTPT VISIT, EST, LEVL IV, 30-39 MIN: ICD-10-PCS | Mod: S$GLB,,, | Performed by: ALLERGY & IMMUNOLOGY

## 2023-12-22 PROCEDURE — 3044F HG A1C LEVEL LT 7.0%: CPT | Mod: CPTII,S$GLB,, | Performed by: ALLERGY & IMMUNOLOGY

## 2023-12-22 RX ORDER — EPINEPHRINE 0.3 MG/.3ML
1 INJECTION SUBCUTANEOUS ONCE
Qty: 2 EACH | Refills: 3 | Status: ACTIVE | OUTPATIENT
Start: 2023-12-22 | End: 2024-02-28

## 2023-12-22 RX ORDER — OMALIZUMAB 150 MG/ML
300 INJECTION, SOLUTION SUBCUTANEOUS
Qty: 4 EACH | Refills: 11 | Status: ACTIVE | OUTPATIENT
Start: 2023-12-22

## 2023-12-22 RX ORDER — OMALIZUMAB 150 MG/ML
300 INJECTION, SOLUTION SUBCUTANEOUS
Qty: 4 EACH | Refills: 11 | Status: SHIPPED | OUTPATIENT
Start: 2023-12-22 | End: 2023-12-22 | Stop reason: SDUPTHER

## 2023-12-22 NOTE — PROGRESS NOTES
"Subjective:   Patient: Crow Green [id number], :1955   Visit date:2023 2:39 PM    Chief Complaint:  Follow-up      HPI:    Prior notes reviewed by myself.  Clinical documentation obtained by nursing staff reviewed.         2023: 68 year old male with a history of elevated igE, dust mite allergy, asthma, dermatomyositis , and COPD here for follow up.  Xolair was ordered previously, but he did not start it.  Signed Xolair consent in January.  He is on O2 intermittently.  Last required albuterol yesterday. Requires daily      Dry skin- arms, head and neck          2023: 67 year old male with asthma, COPD and dust mite allergy.  He is on O2 "all the time". He currently does not have O2 on today in the office- "I don't want to travel with that big tank on me."  Last required his rescue inhaler yesterday. He reports requiring it daily.  He reports that exertion exacerbates symptoms.  He reports coughing and wheezing at night.  He denies requiring oral steroids or a steroid injection.  He reports that he is taking Flonase, which helps his runny nose and nasal congestion.    HPI:     24 - 66 yo M referred by pulm for an evaluation of elevated IgE:    Hx of copd and asthma ; advair and albuterol  Hx of SANDRTIA - CPAP  Hx of CHF - follows with cardiologist    Elevated IgE (1367)  Never tested for allergies, has not received AIT  Ssx - rhinorrhea, nasal congestion, sneezing, watery eyes; intermittent,, year round  Meds - otc allergy eye drops; no anti-histamines    No food allergies    Environmental History:  Environmental Hx: Pets in the home: none. Does not smoke.       Past Medical History:   Diagnosis Date    Arthritis     Asthma     Atrial fibrillation     Atrial fibrillation with RVR 2019    Carotid artery stenosis     CHF (congestive heart failure)     Chronic obstructive pulmonary disease 2020    Depression     Dermatomyositis     Diabetes mellitus, type 2 Diagnosed in  "    Elevated PSA     Follow up 07/30/2020    Hypertension     Myocardial infarction     2017    Sleep apnea         Family History   Problem Relation Age of Onset    Hypertension Mother     Glaucoma Mother     Cataracts Mother     Diabetes Mother     Cataracts Father     Stroke Father     Glaucoma Sister         x3    Cataracts Sister         x3    Diabetes Sister         x1    Stroke Sister         x1    No Known Problems Brother     No Known Problems Daughter     No Known Problems Son     Glaucoma Maternal Aunt     Diabetes Maternal Aunt     Cancer Maternal Grandfather         lung (smoker)    Prostate cancer Neg Hx     Heart disease Neg Hx     Kidney disease Neg Hx        Social History     Socioeconomic History    Marital status: Legally     Number of children: 5   Occupational History    Occupation: disabled    Occupation: PT at [employer]    Tobacco Use    Smoking status: Never    Smokeless tobacco: Never   Substance and Sexual Activity    Alcohol use: Yes     Comment: rare, maybe holidays    Drug use: Not Currently     Types: Cocaine    Sexual activity: Not Currently     Partners: Female   Social History Narrative    Utilizing [employer] transportation which has been unreliable.      Social Determinants of Health     Financial Resource Strain: Low Risk  (12/7/2022)    Overall Financial Resource Strain (CARDIA)     Difficulty of Paying Living Expenses: Not very hard   Food Insecurity: No Food Insecurity (12/7/2022)    Hunger Vital Sign     Worried About Running Out of Food in the Last Year: Never true     Ran Out of Food in the Last Year: Never true   Transportation Needs: Unmet Transportation Needs (12/7/2022)    PRAPARE - Transportation     Lack of Transportation (Medical): Yes     Lack of Transportation (Non-Medical): Yes   Physical Activity: Inactive (12/7/2022)    Exercise Vital Sign     Days of Exercise per Week: 0 days     Minutes of Exercise per Session: 0 min   Stress: Stress  Concern Present (12/7/2022)    [employer] [place] [employer] - Occupational Stress Questionnaire     Feeling of Stress : Rather much   Social Connections: Moderately Isolated (12/7/2022)    Social Connection and Isolation Panel [NHANES]     Frequency of Communication with Friends and Family: Once a week     Frequency of Social Gatherings with Friends and Family: Once a week     Attends Synagogue Services: More than 4 times per year     Active Member of Clubs or Organizations: No     Attends Club or Organization Meetings: More than 4 times per year     Marital Status:    Housing Stability: Low Risk  (12/7/2022)    Housing Stability Vital Sign     Unable to Pay for Housing in the Last Year: No     Number of Places Lived in the Last Year: 1     Unstable Housing in the Last Year: No       Review of patient's allergies indicates:   Allergen Reactions    Protamine Hives     Urticaria, possible upper airway swelling           Medication List with Changes/Refills   Current Medications    ALBUTEROL (PROVENTIL) 2.5 MG /3 ML (0.083 %) NEBULIZER SOLUTION    INHALE THE CONTENTS OF 1 VIAL VIA NEBULIZER EVERY 4 TO 6 HOURS AS NEEDED FOR WHEEZING OR FOR SHORTNESS OF BREATH    ALLOPURINOL (ZYLOPRIM) 100 MG TABLET    Take 1 tablet (100 mg total) by mouth once daily.    AMLODIPINE (NORVASC) 2.5 MG TABLET    Take 1 tablet (2.5 mg total) by mouth once daily.    APIXABAN (ELIQUIS) 5 MG TAB    TAKE 1 TABLET TWICE DAILY    ATORVASTATIN (LIPITOR) 40 MG TABLET    Take 1 tablet (40 mg total) by mouth every evening.    BLOOD SUGAR DIAGNOSTIC STRP    1 each by Misc.(Non-Drug; Combo Route) route 4 (four) times daily.    BLOOD-GLUCOSE METER MISC    Use as instructed to test blood glucose meter daily.    BRINZOLAMIDE-BRIMONIDINE (SIMBRINZA) 1-0.2 % DRPS    Place 1 drop into both eyes 3 (three) times daily.    EMPAGLIFLOZIN (JARDIANCE) 25 MG TABLET    Take 1 tablet (25 mg total) by mouth once daily.     "FLUTICASONE-UMECLIDIN-VILANTER (TRELEGY ELLIPTA) 200-62.5-25 MCG INHALER    Inhale 1 puff into the lungs once daily.    FUROSEMIDE (LASIX) 40 MG TABLET    Take 2 tablets (80 mg total) by mouth 2 (two) times a day.    GABAPENTIN (NEURONTIN) 800 MG TABLET    TAKE 1 TABLET(800 MG) BY MOUTH THREE TIMES DAILY    HYDROCODONE-ACETAMINOPHEN (NORCO)  MG PER TABLET    Take 1 tablet by mouth every 4 (four) hours as needed for Pain.    INSULIN ASPART U-100 (NOVOLOG FLEXPEN U-100 INSULIN) 100 UNIT/ML (3 ML) INPN PEN    INJECT 10 UNITS UNDER THE SKIN THREE TIMES DAILY WITH MEALS    LANCETS (TRUEPLUS LANCETS) 33 GAUGE MISC    Use to check blood sugar twice a day    LATANOPROST 0.005 % OPHTHALMIC SOLUTION    INSTILL 1 DROP INTO BOTH EYES ONE TIME DAILY BOTTLE EXPIRES 42 DAYS AFTER OPENING...    LISINOPRIL (PRINIVIL,ZESTRIL) 30 MG TABLET    TAKE 1 TABLET EVERY DAY.    LISINOPRIL 10 MG TABLET    TAKE 1 TABLET EVERY DAY    NAPROXEN (NAPROSYN) 500 MG TABLET    Take 1 tablet (500 mg total) by mouth 2 (two) times daily with meals.    OMEPRAZOLE (PRILOSEC) 20 MG CAPSULE    Take 20 mg by mouth every morning.    PANTOPRAZOLE (PROTONIX) 40 MG TABLET    Take 1 tablet (40 mg total) by mouth once daily.    PAPAVERINE-PHENTOLAMIN-ALPROST 150 MG-5 MG- 50 MCG SOLR    0.5 mLs by Intracavernosal route as needed (ED).    PEN NEEDLE, DIABETIC (BD ULTRA-FINE SHORT PEN NEEDLE) 31 GAUGE X 5/16" NDLE    1 each by Misc.(Non-Drug; Combo Route) route 3 (three) times daily.    PREDNISONE (DELTASONE) 50 MG TAB    Take 50 mg by mouth.    RANOLAZINE (RANEXA) 1,000 MG TB12    TAKE 1 TABLET TWICE DAILY    SEMAGLUTIDE (OZEMPIC) 2 MG/DOSE (8 MG/3 ML) PNIJ    Inject 2 mg into the skin every 7 days.    SERTRALINE (ZOLOFT) 100 MG TABLET    Take 1 tablet (100 mg total) by mouth every evening.    TAMSULOSIN (FLOMAX) 0.4 MG CAP    Take 1 capsule (0.4 mg total) by mouth once daily.    TRAMADOL (ULTRAM) 50 MG TABLET    Take 50 mg by mouth.    TRAZODONE (DESYREL) 100 " "MG TABLET    TAKE 2 TABLETS EVERY EVENING    [employer] AEROSOL DELIVERY SYSTEM VIN    USE AS DIRESTED   Changed and/or Refilled Medications    Modified Medication Previous Medication    EPINEPHRINE (EPIPEN) 0.3 MG/0.3 ML ATIN EPINEPHrine (EPIPEN) 0.3 mg/0.3 mL AtIn       Inject 0.3 mLs (0.3 mg total) into the muscle once. for 1 dose    Inject 0.3 mLs (0.3 mg total) into the muscle once. for 1 dose    OMALIZUMAB (XOLAIR) 150 MG/ML INJECTION omalizumab (XOLAIR) 150 mg/mL injection       Inject 2 mLs (300 mg total) into the skin every 14 (fourteen) days.    Inject 2 mLs (300 mg total) into the skin every 14 (fourteen) days.    OMALIZUMAB (XOLAIR) 75 MG/0.5 ML INJECTION omalizumab (XOLAIR) 75 mg/0.5 mL injection       Inject 0.5 mLs (75 mg total) into the skin every 14 (fourteen) days.    Inject 0.5 mLs (75 mg total) into the skin every 14 (fourteen) days.         Objective:     Physical Exam:  Vitals:  /78 (BP Location: Left arm, Patient Position: Sitting, BP Method: Large (Automatic))   Pulse 88   Temp 97.7 °F (36.5 °C)   Ht 5' 5" (1.651 m)   Wt 90.4 kg (199 lb 4.7 oz)   BMI 33.16 kg/m²   Constitutional:       General: No acute distress. Alert, well developed.   HENT:      Head: Normocephalic, no lesions.      Right Ear: Pinna and external ear appears normal. There is no impacted cerumen. TM clear, intact, wnl. EAC WNL.      Left Ear: Pinna and external ear appears normal. There is no impacted cerumen. TM clear, intact, wnl. EAC WNL.     Nose: No mass or lesion. Clear mucus. BIT's WNL.     Mouth/Throat: No oropharyngeal exudate or posterior oropharyngeal erythema.   Eyes:      General: No scleral icterus.Sclera white, extraocular movements intact.        Right eye: No discharge.         Left eye: No discharge.   Neck: Supple, no palpable nodes, no masses, trachea midline, no thyromegaly.   Cardiovascular:      Rate and Rhythm: Normal rate and regular rhythm.      Heart sounds: Normal heart sounds. No murmur " heard.   Pulmonary:      Effort: Pulmonary effort is normal. No respiratory distress.      Breath sounds: Normal breath sounds. No stridor. No rhonchi, or rales. No crackles or wheezing.   Musculoskeletal:         General: No swelling, tenderness, deformity or signs of injury.    Skin:     General: Skin is warm.      Coloration: Skin is not jaundiced or pale.      Findings: No bruising, erythema, or rash.   Neurological:      Alert. Moves all extremities spontaneously  Psychiatric:         Mood is normal. Behavior is normal.              Assessment & Plan:   Allergic rhinitis due to [place] house dust mite    Moderate persistent asthma without complication  -     Discontinue: omalizumab (XOLAIR) 150 mg/mL injection; Inject 2 mLs (300 mg total) into the skin every 14 (fourteen) days.  Dispense: 4 each; Refill: 11  -     Discontinue: omalizumab (XOLAIR) 75 mg/0.5 mL injection; Inject 0.5 mLs (75 mg total) into the skin every 14 (fourteen) days.  Dispense: 2 each; Refill: 11  -     omalizumab (XOLAIR) 75 mg/0.5 mL injection; Inject 0.5 mLs (75 mg total) into the skin every 14 (fourteen) days.  Dispense: 2 each; Refill: 11  -     omalizumab (XOLAIR) 150 mg/mL injection; Inject 2 mLs (300 mg total) into the skin every 14 (fourteen) days.  Dispense: 4 each; Refill: 11  -     EPINEPHrine (EPIPEN) 0.3 mg/0.3 mL AtIn; Inject 0.3 mLs (0.3 mg total) into the muscle once. for 1 dose  Dispense: 2 each; Refill: 3    Elevated IgE level  -     Discontinue: omalizumab (XOLAIR) 150 mg/mL injection; Inject 2 mLs (300 mg total) into the skin every 14 (fourteen) days.  Dispense: 4 each; Refill: 11  -     Discontinue: omalizumab (XOLAIR) 75 mg/0.5 mL injection; Inject 0.5 mLs (75 mg total) into the skin every 14 (fourteen) days.  Dispense: 2 each; Refill: 11  -     omalizumab (XOLAIR) 75 mg/0.5 mL injection; Inject 0.5 mLs (75 mg total) into the skin every 14 (fourteen) days.  Dispense: 2 each; Refill: 11  -     omalizumab (XOLAIR) 150  mg/mL injection; Inject 2 mLs (300 mg total) into the skin every 14 (fourteen) days.  Dispense: 4 each; Refill: 11      Discussed Xolair. Consent was signed in January.  Epipen 2 pack refilled. Advised that he must wait 2 hours in office with the first infection. Subsequent visits- 30 minute wait. He must bring his Epipen 2 pack with him    RTC 3-4 months   ELISEO VERAS                  Problems Address                                                 Amount and/or Complexity                                                                      Risk       3           [] 2 or more self-limited or minor problems                      [] Limited                                                                        [] Low                  [] 1 stable chronic illness                                                  Any combination of the two                                               OTC drugs                  []Acute, uncomplicated illness or injury                            Review of prior external notes from unique source           Minor surgery with no risk factors                                                                                                               [] 1 []2  []3+                                                                                                              Review of results from each unique test                                                                                                               [] 1 []2  [] 3+                                                                                                              Order of each unique test                                                                                                               [] 1 []2  [] 3+                                                                                                              Or                                                                                                              [] Assessment requiring an independent historian      4            [] One or more chronic illness with exacerbation,              [] Moderate                                                                      [x] Moderate                 Progression, or side effects of treatment                            -test documents or independent historians                        Prescription drug management                [x]  2 or more stable chronic illnesses                                    [] Independent interpretation of tests                              Minor surgery with identifiable risk                [] 1 undiagnosed new problem with uncertain prognosis    [] Discussion or management of test results                    elective major surgery                [] 1 acute illness with                systemic symptoms                                                                                                                                                              [] 1 acute complicated injury                                                                                                                                          Elective major surgery                                                                                                                                                                                                                                                                                                                                                                                                  5            [] 1 or more chronic illnesses with severe exacerbation,     [] Extensive(two from below)                                         [] High                                                                                                               [] Independent interpretation of results                         Drug therapy requiring intensive                                                                                                                []Discussion of management or test interpretation           monitoring                                                                                                                                                                                                       Decision to de-escalate care                 [] 1 acute or chronic illness or injury that poses a threat                                                                                               Decision regarding hospitalization

## 2023-12-26 NOTE — ED PROVIDER NOTES
SCRIBE #1 NOTE: I, Gato Ramirez, am scribing for, and in the presence of, Jose David Kay MD. I have scribed the entire note.      History      Chief Complaint   Patient presents with    Tremors       Review of patient's allergies indicates:  No Known Allergies     HPI   HPI    9/18/2018, 8:34 PM   History obtained from the patient      History of Present Illness: Crow Green is a 63 y.o. male patient who presents to the Emergency Department for bilateral hand weakness which onset gradually yesterday. Pt reports that he has been having problems holding things in both hands. This is the primary reason he called the ambulance. Pt complains of chest pain that onsets immediately as we walk into the room to collect hx. It was not present before this. Symptoms are constant and moderate in severity. No mitigating or exacerbating factors reported. Associated sxs include dizziness, tremors in his hands, diarrhea and HA x2 days. Patient denies any fever, chills, abd pain, n/v, palpitations, leg swelling, SOB, numbness, paresthesia, facial asymmetry, dysuria, hematuria, frequency, hematochezia, and all other sxs at this time. He reports that the weakness in his hands occurred one other time. He was evaluated but never received a dx. No further complaints or concerns at this time.         Arrival mode: AASI    PCP: Mateo Lambert DO       Past Medical History:  Past Medical History:   Diagnosis Date    Acute kidney injury     Arthritis     Asthma     Atrial fibrillation     CHF (congestive heart failure)     Depression     Diabetes mellitus, type 2 Diagnosed in 2000    Elevated PSA     Hypertension     Sleep apnea        Past Surgical History:  Past Surgical History:   Procedure Laterality Date    BIOPSY-ARTERY-TEMPORAL Left 9/1/2017    Performed by Torin Bishop MD at [facility] OR    CHOLECYSTECTOMY      CHOLECYSTECTOMY-LAPAROSCOPIC N/A 3/17/2017    Performed by Louis O. Jeansonne IV, MD at [facility] OR          Family History:  Family History   Problem Relation Age of Onset    Glaucoma Mother     Cataracts Mother     Cataracts Father     Glaucoma Sister     Cataracts Sister     Glaucoma Maternal Aunt     Prostate cancer Neg Hx        Social History:  Social History     Tobacco Use    Smoking status: Never Smoker    Smokeless tobacco: Never Used   Substance and Sexual Activity    Alcohol use: No    Drug use: No    Sexual activity: Not Currently       ROS   Review of Systems   Constitutional: Negative for chills and fever.   HENT: Negative for sore throat.    Eyes: Negative for visual disturbance.   Respiratory: Negative for shortness of breath.    Cardiovascular: Positive for chest pain. Negative for palpitations and leg swelling.   Gastrointestinal: Positive for diarrhea. Negative for abdominal pain, blood in stool, nausea and vomiting.   Genitourinary: Negative for difficulty urinating, dysuria, flank pain, frequency and hematuria.   Musculoskeletal: Negative for back pain.   Skin: Negative for rash.   Neurological: Positive for dizziness, tremors (bilateral hands), weakness (bilateral hands) and headaches. Negative for facial asymmetry, speech difficulty and numbness.   Hematological: Does not bruise/bleed easily.   All other systems reviewed and are negative.      Physical Exam      Initial Vitals [09/18/18 2027]   BP Pulse Resp Temp SpO2   116/76 85 18 98.4 °F (36.9 °C) 98 %      MAP       --          Physical Exam  Nursing Notes and Vital Signs Reviewed.  Constitutional: Patient is in no acute distress. Well-developed and well-nourished.  Head: Atraumatic. Normocephalic.  Eyes: PERRL. EOM intact. Conjunctivae are not pale. No scleral icterus.  ENT: Mucous membranes are moist. Oropharynx is clear and symmetric.    Neck: Supple. Full ROM. No lymphadenopathy.  Cardiovascular: Regular rate. Regular rhythm. No murmurs, rubs, or gallops. Distal pulses are 2+ and symmetric.  Pulmonary/Chest: No  "respiratory distress. Clear to auscultation bilaterally. No wheezing or rales.  Abdominal: Soft and non-distended.  There is no tenderness.  No rebound, guarding, or rigidity.   Musculoskeletal: Moves all extremities. No obvious deformities. No edema. No calf tenderness.  Skin: Warm and dry.  Neurological:  Alert, awake, and appropriate.  Normal speech.  No acute focal neurological deficits are appreciated.  Psychiatric: Normal affect. Good eye contact. Appropriate in content.    ED Course    Procedures  ED Vital Signs:  Vitals:    09/18/18 2027 09/18/18 2106 09/18/18 2111 09/18/18 2346   BP: 116/76  115/74 119/62   Pulse: 85 86 85 90   Resp: 18  20 18   Temp: 98.4 °F (36.9 °C)      TempSrc: Oral      SpO2: 98%      Weight: 94.8 kg (209 lb)      Height: 5' 5" (1.651 m)       09/19/18 0146   BP: 127/69   Pulse: 86   Resp: 16   Temp:    TempSrc:    SpO2: 95%   Weight:    Height:        Abnormal Lab Results:  Labs Reviewed   CK - Abnormal; Notable for the following components:       Result Value     (*)     All other components within normal limits   CK-MB - Abnormal; Notable for the following components:     (*)     All other components within normal limits   TROPONIN I - Abnormal; Notable for the following components:    Troponin I 0.275 (*)     All other components within normal limits   B-TYPE NATRIURETIC PEPTIDE - Abnormal; Notable for the following components:     (*)     All other components within normal limits   CBC W/ AUTO DIFFERENTIAL - Abnormal; Notable for the following components:    Hemoglobin 13.0 (*)     RDW 15.3 (*)     All other components within normal limits   COMPREHENSIVE METABOLIC PANEL - Abnormal; Notable for the following components:    CO2 31 (*)     Glucose 141 (*)     BUN, Bld 30 (*)     Creatinine 1.7 (*)     Albumin 3.2 (*)     eGFR if  49 (*)     eGFR if non  43 (*)     All other components within normal limits   URINALYSIS - Abnormal; " Notable for the following components:    Occult Blood UA 1+ (*)     All other components within normal limits   URINALYSIS MICROSCOPIC - Abnormal; Notable for the following components:    RBC, UA 5 (*)     All other components within normal limits   MAGNESIUM   TSH   ALCOHOL,MEDICAL (ETHANOL)   DRUG SCREEN PANEL, URINE EMERGENCY   ALCOHOL,MEDICAL (ETHANOL)   DRUG SCREEN PANEL, URINE EMERGENCY        All Lab Results:  Results for orders placed or performed during the hospital encounter of 09/18/18   CK   Result Value Ref Range     (H) 20 - 200 U/L   CK-MB   Result Value Ref Range     (H) 20 - 200 U/L    CPK MB 4.3 0.1 - 6.5 ng/mL    MB% 1.8 0.0 - 5.0 %   Troponin I   Result Value Ref Range    Troponin I 0.275 (H) 0.000 - 0.026 ng/mL   Magnesium   Result Value Ref Range    Magnesium 1.7 1.6 - 2.6 mg/dL   Brain natriuretic peptide   Result Value Ref Range     (H) 0 - 99 pg/mL   TSH   Result Value Ref Range    TSH 0.848 0.400 - 4.000 uIU/mL   CBC auto differential   Result Value Ref Range    WBC 10.83 3.90 - 12.70 K/uL    RBC 4.70 4.60 - 6.20 M/uL    Hemoglobin 13.0 (L) 14.0 - 18.0 g/dL    Hematocrit 40.0 40.0 - 54.0 %    MCV 85 82 - 98 fL    MCH 27.7 27.0 - 31.0 pg    MCHC 32.5 32.0 - 36.0 g/dL    RDW 15.3 (H) 11.5 - 14.5 %    Platelets 186 150 - 350 K/uL    MPV 10.3 9.2 - 12.9 fL    Gran # (ANC) 7.1 1.8 - 7.7 K/uL    Lymph # 2.7 1.0 - 4.8 K/uL    Mono # 0.9 0.3 - 1.0 K/uL    Eos # 0.2 0.0 - 0.5 K/uL    Baso # 0.01 0.00 - 0.20 K/uL    Gran% 65.9 38.0 - 73.0 %    Lymph% 24.7 18.0 - 48.0 %    Mono% 7.9 4.0 - 15.0 %    Eosinophil% 1.4 0.0 - 8.0 %    Basophil% 0.1 0.0 - 1.9 %    Differential Method Automated    Comprehensive metabolic panel   Result Value Ref Range    Sodium 137 136 - 145 mmol/L    Potassium 4.3 3.5 - 5.1 mmol/L    Chloride 95 95 - 110 mmol/L    CO2 31 (H) 23 - 29 mmol/L    Glucose 141 (H) 70 - 110 mg/dL    BUN, Bld 30 (H) 8 - 23 mg/dL    Creatinine 1.7 (H) 0.5 - 1.4 mg/dL    Calcium  9.6 8.7 - 10.5 mg/dL    Total Protein 7.4 6.0 - 8.4 g/dL    Albumin 3.2 (L) 3.5 - 5.2 g/dL    Total Bilirubin 0.3 0.1 - 1.0 mg/dL    Alkaline Phosphatase 63 55 - 135 U/L    AST 21 10 - 40 U/L    ALT 22 10 - 44 U/L    Anion Gap 11 8 - 16 mmol/L    eGFR if African American 49 (A) >60 mL/min/1.73 m^2    eGFR if non African American 43 (A) >60 mL/min/1.73 m^2   Urinalysis   Result Value Ref Range    Specimen UA Urine, Clean Catch     Color, UA Yellow Yellow, Straw, Angela    Appearance, UA Clear Clear    pH, UA 8.0 5.0 - 8.0    Specific Gravity, UA 1.010 1.005 - 1.030    Protein, UA Negative Negative    Glucose, UA Negative Negative    Ketones, UA Negative Negative    Bilirubin (UA) Negative Negative    Occult Blood UA 1+ (A) Negative    Nitrite, UA Negative Negative    Urobilinogen, UA Negative <2.0 EU/dL    Leukocytes, UA Negative Negative   Ethanol   Result Value Ref Range    Alcohol, Medical, Serum <10 <10 mg/dL   Urinalysis Microscopic   Result Value Ref Range    RBC, UA 5 (H) 0 - 4 /hpf    Microscopic Comment SEE COMMENT    Drug screen panel, emergency   Result Value Ref Range    Benzodiazepines Negative     Methadone metabolites Negative     Cocaine (Metab.) Negative     Opiate Scrn, Ur Negative     Barbiturate Screen, Ur Negative     Amphetamine Screen, Ur Negative     THC Negative     Phencyclidine Negative     Creatinine, Random Ur 51.5 23.0 - 375.0 mg/dL    Toxicology Information SEE COMMENT          Imaging Results:  Imaging Results          CT Head Without Contrast (Final result)  Result time 09/18/18 21:33:02    Final result by Cm Bush MD (09/18/18 21:33:02)                 Impression:      1.  Negative for acute intracranial process. Negative for hemorrhage, or skull fracture.    2.  Atrophy, intracranial atherosclerotic disease and small vessel ischemic changes.    3.  Stable findings as noted above.    All CT scans at this facility are performed  using dose modulation techniques as appropriate to  performed exam including the following:  automated exposure control; adjustment of mA and/or kV according to the patients size (this includes techniques or standardized protocols for targeted exams where dose is matched to indication/reason for exam: i.e. extremities or head);  iterative reconstruction technique.      Electronically signed by: Cm Bush MD  Date:    09/18/2018  Time:    21:33             Narrative:    EXAMINATION:  CT HEAD WITHOUT CONTRAST    CLINICAL HISTORY:  Dizziness;    TECHNIQUE:  Axial images through the brain and posterior fossa were obtained without the use of IV contrast.  Sagittal and coronal reconstructions are provided for review.    COMPARISON:  Brain MRI from August 22, 2017    FINDINGS:  The ventricles are midline and the CSF spaces are prominent.  The gray-white matter junction is well preserved. Negative for intracranial vascular abnormalities. Negative for mass, mass effect, cerebral edema, hemorrhage or abnormal fluid collections.  Intracranial atherosclerotic disease.  Small vessel ischemic changes.  Falx and arachnoid granulation calcifications.  Bilateral basal ganglia calcifications.    The skull and scalp are intact.  Rightward nasal septal deviation.  Hypoplastic mastoid air cells.  The rest of the paranasal sinuses, mastoid air cells, middle ears and ear canals are clear. The globes are intact.                               X-Ray Chest PA And Lateral (Final result)  Result time 09/18/18 21:35:43    Final result by Cm Bush MD (09/18/18 21:35:43)                 Impression:      1.  Negative for acute process involving the chest.    2.  Stable findings as noted above.      Electronically signed by: Cm Bush MD  Date:    09/18/2018  Time:    21:35             Narrative:    EXAMINATION:  XR CHEST PA AND LATERAL    CLINICAL HISTORY:  Dizziness and giddiness    COMPARISON:  Studies dating back to October 13, 2016    FINDINGS:  EKG leads overlie the chest.  Low  "lung volumes with persistent scarring or chronic atelectasis in the right lung base.  The lungs are free of new pulmonary opacities.  The cardiac silhouette size is enlarged.  The trachea is midline and the mediastinal width is normal. Negative for effusion or pneumothorax.  Pulmonary vasculature is normal. Negative for osseous abnormalities. Ectatic and tortuous aorta.  Convex right curvature of the midthoracic spine.  Cardiophrenic fat pads.                               The EKG was ordered, reviewed, and independently interpreted by the ED provider.  Interpretation time: 2107  Rate: 90 BPM  Rhythm: Sinus rhythm with marked sinus arrythmia  Interpretation: Left anterior fascicular block. Septal infarct (cited on or before 06-Aug-2018). No STEMI.  When compared to EKG performed 13-Sep-2018, there are no significant changes.           The Emergency Provider reviewed the vital signs and test results, which are outlined above.    ED Discussion     10:25 PM: Reviewed pt's cardiology records from yesterday. Pt was seen for the exact same complaints. Dr. Zhao is already reviewing the pt's case and is treating sxs outpatient.    12:51 AM: A full work-up has been completed. There has been nothing appreciated as especially alarming throughout the pt's stay in the ED. Pt was seen in a clinic and sent to the ED on 09/13. He followed up with cardiology following this on 09/17. He is being followed by Dr. Zhao currently and he has presented here with no new sxs. Earlier this month, pt was contacting the pain management clinic and threatening to "fire them" if they didn't give him something for pain.     1:50 AM: Reassessed pt at this time.  Encouraged pt to follow through with his plan of treatment given by Dr. Zhao. Discussed with pt all pertinent ED information and results. Discussed pt dx and plan of tx. Gave pt all f/u and return to the ED instructions. All questions and concerns were addressed at this time. Pt expresses " understanding of information and instructions, and is comfortable with plan to discharge. Pt is stable for discharge.    I discussed with patient and/or family/caretaker that evaluation in the ED does not suggest any emergent or life threatening medical conditions requiring immediate intervention beyond what was provided in the ED, and I believe patient is safe for discharge.  Regardless, an unremarkable evaluation in the ED does not preclude the development or presence of a serious of life threatening condition. As such, patient was instructed to return immediately for any worsening or change in current symptoms.      ED Medication(s):  Medications   sodium chloride 0.9% bolus 1,000 mL (0 mLs Intravenous Stopped 9/19/18 0001)       Follow-up Information     Mateo Lambert DO. Schedule an appointment as soon as possible for a visit in 1 day.    Specialty:  Family Medicine  Why:  Can return to the ER, If symptoms worsen  Contact information:  [address] [address] [address]  [phone]                     Medical Decision Making    Medical Decision Making:   Clinical Tests:   Lab Tests: Ordered and Reviewed  Radiological Study: Ordered and Reviewed  Medical Tests: Ordered and Reviewed           Scribe Attestation:   Scribe #1: I performed the above scribed service and the documentation accurately describes the services I performed. I attest to the accuracy of the note.    Attending:   Physician Attestation Statement for Scribe #1: I, Jose David Kay MD, personally performed the services described in this documentation, as scribed by Gato Ramirez, in my presence, and it is both accurate and complete.          Clinical Impression       ICD-10-CM ICD-9-CM   1. Tremors of nervous system R25.1 781.0   2. Dizziness R42 780.4   3. Weakness R53.1 780.79   4. Chest pain, unspecified type R07.9 786.50       Disposition:   Disposition: Discharged  Condition: Stable         Jose David Kay MD  09/19/18  6081     stated

## 2023-12-27 ENCOUNTER — TELEPHONE (OUTPATIENT)
Dept: PODIATRY | Facility: CLINIC | Age: 68
End: 2023-12-27
Payer: MEDICARE

## 2023-12-27 ENCOUNTER — TELEPHONE (OUTPATIENT)
Dept: INTERNAL MEDICINE | Facility: CLINIC | Age: 68
End: 2023-12-27
Payer: MEDICARE

## 2023-12-27 NOTE — TELEPHONE ENCOUNTER
Called the pt and no answer, no vm is set up.             ----- Message from Jd Gutiérrez sent at 12/27/2023 10:52 AM CST -----  Contact: thelma  Thelma is calling requesting a call back in reference to his diabetic shoes.  Please give him a call back at [phone]

## 2023-12-27 NOTE — TELEPHONE ENCOUNTER
Spoke with the pt about his shoe RX, I let him know that we have already set all information over to [facility]           ----- Message from Katy Dumont sent at 12/27/2023  3:48 PM CST -----  Patient is requesting a call back concerning a call he missed. Call back at [phone]

## 2023-12-27 NOTE — TELEPHONE ENCOUNTER
----- Message from Jd Gutiérrez sent at 12/27/2023 10:53 AM CST -----  Contact: thelma  Type:  Sooner Apoointment Request  Name of Caller:Thelma  When is the first available appointment?02/2024  Symptoms:med refill  Would the patient rather a call back or a response via [facility]? Call backl  Best Call Back Number:[phone]  Additional Information:

## 2023-12-29 ENCOUNTER — PATIENT MESSAGE (OUTPATIENT)
Dept: INTERNAL MEDICINE | Facility: CLINIC | Age: 68
End: 2023-12-29
Payer: MEDICARE

## 2023-12-29 DIAGNOSIS — I65.23 BILATERAL CAROTID ARTERY STENOSIS: Primary | ICD-10-CM

## 2023-12-29 PROBLEM — J45.40 MODERATE PERSISTENT ASTHMA WITHOUT COMPLICATION: Status: ACTIVE | Noted: 2023-12-29

## 2024-01-01 PROCEDURE — G0179 MD RECERTIFICATION HHA PT: HCPCS | Mod: ,,, | Performed by: FAMILY MEDICINE

## 2024-01-02 ENCOUNTER — OFFICE VISIT (OUTPATIENT)
Dept: UROLOGY | Facility: CLINIC | Age: 69
End: 2024-01-02
Payer: MEDICARE

## 2024-01-02 VITALS
BODY MASS INDEX: 33.21 KG/M2 | SYSTOLIC BLOOD PRESSURE: 142 MMHG | DIASTOLIC BLOOD PRESSURE: 78 MMHG | WEIGHT: 199.31 LBS | HEIGHT: 65 IN

## 2024-01-02 DIAGNOSIS — N52.9 ERECTILE DYSFUNCTION, UNSPECIFIED ERECTILE DYSFUNCTION TYPE: ICD-10-CM

## 2024-01-02 PROCEDURE — 99213 OFFICE O/P EST LOW 20 MIN: CPT | Mod: S$GLB,,, | Performed by: UROLOGY

## 2024-01-02 PROCEDURE — 3077F SYST BP >= 140 MM HG: CPT | Mod: CPTII,S$GLB,, | Performed by: UROLOGY

## 2024-01-02 PROCEDURE — 99999 PR PBB SHADOW E&M-EST. PATIENT-LVL V: CPT | Mod: PBBFAC,,, | Performed by: UROLOGY

## 2024-01-02 PROCEDURE — 3288F FALL RISK ASSESSMENT DOCD: CPT | Mod: CPTII,S$GLB,, | Performed by: UROLOGY

## 2024-01-02 PROCEDURE — 1101F PT FALLS ASSESS-DOCD LE1/YR: CPT | Mod: CPTII,S$GLB,, | Performed by: UROLOGY

## 2024-01-02 PROCEDURE — 3008F BODY MASS INDEX DOCD: CPT | Mod: CPTII,S$GLB,, | Performed by: UROLOGY

## 2024-01-02 PROCEDURE — 1160F RVW MEDS BY RX/DR IN RCRD: CPT | Mod: CPTII,S$GLB,, | Performed by: UROLOGY

## 2024-01-02 PROCEDURE — 1159F MED LIST DOCD IN RCRD: CPT | Mod: CPTII,S$GLB,, | Performed by: UROLOGY

## 2024-01-02 PROCEDURE — 1125F AMNT PAIN NOTED PAIN PRSNT: CPT | Mod: CPTII,S$GLB,, | Performed by: UROLOGY

## 2024-01-02 PROCEDURE — 3078F DIAST BP <80 MM HG: CPT | Mod: CPTII,S$GLB,, | Performed by: UROLOGY

## 2024-01-02 NOTE — PROGRESS NOTES
Chief Complaint:   Erectile dysfunction    HPI:   01/02/2024 - returns today for follow-up, still wants to try the TriMix, never picked up the medication and never got teaching, no voiding issues or gross hematuria    Patient is a 67-year-old male that is presenting with complaints of erectile dysfunction.  Patient states that he is unable to maintain an erection and does not have morning erections.  Unfortunately, patient has uncontrolled diabetes hypertension, obesity and has a history of previous MI. Reports that he has not used any treatment for erectile dysfunction,in the past.    Allergies:  Protamine    Medications:  has a current medication list which includes the following prescription(s): albuterol, allopurinol, amlodipine, eliquis, atorvastatin, blood sugar diagnostic, blood-glucose meter, simbrinza, empagliflozin, epinephrine, trelegy ellipta, furosemide, gabapentin, hydrocodone-acetaminophen, insulin aspart u-100, lancets, latanoprost, lisinopril, lisinopril, naproxen, xolair, omalizumab, omeprazole, pantoprazole, papaverine-phentolamin-alprost, pen needle, diabetic, alprostadil, prednisone, ranolazine, ozempic, sertraline, tamsulosin, tramadol, trazodone, vios aerosol delivery system, and [DISCONTINUED] diclofenac sodium.    Review of Systems:  General: No fever, chills  Skin: No rashes  Chest:  Denies cough and sputum production  Heart: Denies chest pain  Resp: Denies dyspnea  Abdomen: Denies diarrhea, abdominal pain, hematemesis, or blood in stool.  Musculoskeletal: No joint stiffness or swelling. Denies back pain.  : see HPI  Neuro: no dizziness or weakness      PMH:   has a past medical history of Arthritis, Asthma, Atrial fibrillation, Atrial fibrillation with RVR (08/07/2019), Carotid artery stenosis, CHF (congestive heart failure), Chronic obstructive pulmonary disease (08/05/2020), Depression, Dermatomyositis, Diabetes mellitus, type 2 (Diagnosed in 2000), Elevated PSA, Follow up  (07/30/2020), Hypertension, Myocardial infarction, and Sleep apnea.    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    FamHx: family history includes Cancer in his maternal grandfather; Cataracts in his father, mother, and sister; Diabetes in his maternal aunt, mother, and sister; Glaucoma in his maternal aunt, mother, and sister; Hypertension in his mother; No Known Problems in his brother, daughter, and son; Stroke in his father and sister.    SocHx:  reports that he has never smoked. He has never used smokeless tobacco. He reports current alcohol use. He reports that he does not currently use drugs after having used the following drugs: Cocaine.      Physical Exam:  Vitals:    01/02/24 1100   BP: (!) 142/78   General: awake, alert, cooperative  Head: NC/AT  Ears: external ears normal  Eyes: sclera normal  Lungs: normal inspiration, NAD  Heart: well-perfused  Skin: The skin is warm and dry  Ext: No c/c/e.  Neuro: grossly intact, AOx3      Impression/Plan:   ED - Trimix Rx and f/u for teaching    Jose Dickson MD

## 2024-01-10 ENCOUNTER — OFFICE VISIT (OUTPATIENT)
Dept: PODIATRY | Facility: CLINIC | Age: 69
End: 2024-01-10
Payer: MEDICARE

## 2024-01-10 VITALS — BODY MASS INDEX: 33.15 KG/M2 | HEIGHT: 65 IN | WEIGHT: 199 LBS

## 2024-01-10 DIAGNOSIS — L84 PRE-ULCERATIVE CALLUSES: ICD-10-CM

## 2024-01-10 DIAGNOSIS — M51.26 HNP (HERNIATED NUCLEUS PULPOSUS), LUMBAR: ICD-10-CM

## 2024-01-10 DIAGNOSIS — B35.1 DERMATOPHYTOSIS OF NAIL: ICD-10-CM

## 2024-01-10 DIAGNOSIS — E11.49 TYPE II DIABETES MELLITUS WITH NEUROLOGICAL MANIFESTATIONS: Primary | ICD-10-CM

## 2024-01-10 PROCEDURE — 99999 PR PBB SHADOW E&M-EST. PATIENT-LVL IV: CPT | Mod: PBBFAC,,, | Performed by: PODIATRIST

## 2024-01-10 PROCEDURE — 11721 DEBRIDE NAIL 6 OR MORE: CPT | Mod: 59,Q9,S$GLB, | Performed by: PODIATRIST

## 2024-01-10 PROCEDURE — 99499 UNLISTED E&M SERVICE: CPT | Mod: S$GLB,,, | Performed by: PODIATRIST

## 2024-01-10 PROCEDURE — 11056 PARNG/CUTG B9 HYPRKR LES 2-4: CPT | Mod: Q9,S$GLB,, | Performed by: PODIATRIST

## 2024-01-10 RX ORDER — GABAPENTIN 800 MG/1
800 TABLET ORAL 3 TIMES DAILY
Qty: 270 TABLET | Refills: 4 | Status: SHIPPED | OUTPATIENT
Start: 2024-01-10 | End: 2024-01-12 | Stop reason: SDUPTHER

## 2024-01-10 NOTE — PROGRESS NOTES
Subjective:     Patient ID: Crow Green is a 68 y.o. male.    Chief Complaint: Routine Foot Care (3 month RFC, pt states he has pain at the bottom of his feet, pt rates pain today 9/10, pt is a diabetic, pt last seen pcp 10/30/23, pt wears tennis shoes)      Crow is a 68 y.o. male who presents to the clinic for evaluation and treatment of high risk feet. Crow has a past medical history of Arthritis, Asthma, Atrial fibrillation, Atrial fibrillation with RVR (08/07/2019), Carotid artery stenosis, CHF (congestive heart failure), Chronic obstructive pulmonary disease (08/05/2020), Depression, Dermatomyositis, Diabetes mellitus, type 2 (Diagnosed in 2000), Elevated PSA, Follow up (07/30/2020), Hypertension, Myocardial infarction, and Sleep apnea. The patient's chief complaint is long, thick toenails. This patient has documented high risk feet requiring routine maintenance secondary to diabetes mellitis and those secondary complications of diabetes, as mentioned.. Patient state she is running out of Gabapentin and his feet and hands are burning and tingling. Patient rates pain 9/10    PCP: Cm Polanco MD    Date Last Seen by PCP: 10/30/2023    Current shoe gear:  Affected Foot: Rx diabetic extra depth shoes and custom accommodative insoles     Unaffected Foot: Rx diabetic extra depth shoes and custom accommodative insoles    Hemoglobin A1C   Date Value Ref Range Status   09/29/2023 6.4 (H) 4.0 - 5.6 % Final     Comment:     ADA Screening Guidelines:  5.7-6.4%  Consistent with prediabetes  >or=6.5%  Consistent with diabetes    High levels of fetal hemoglobin interfere with the HbA1C  assay. Heterozygous hemoglobin variants (HbS, HgC, etc)do  not significantly interfere with this assay.   However, presence of multiple variants may affect accuracy.     05/23/2023 6.2 <=6.5 % Final   01/04/2023 6.3 (H) 4.0 - 5.6 % Final     Comment:     ADA Screening Guidelines:  5.7-6.4%  Consistent with  prediabetes  >or=6.5%  Consistent with diabetes    High levels of fetal hemoglobin interfere with the HbA1C  assay. Heterozygous hemoglobin variants (HbS, HgC, etc)do  not significantly interfere with this assay.   However, presence of multiple variants may affect accuracy.     07/31/2022 6.0 (H) 4.0 - 5.6 % Final     Comment:     ADA Screening Guidelines:  5.7-6.4%  Consistent with prediabetes  >or=6.5%  Consistent with diabetes    High levels of fetal hemoglobin interfere with the HbA1C  assay. Heterozygous hemoglobin variants (HbS, HgC, etc)do  not significantly interfere with this assay.   However, presence of multiple variants may affect accuracy.         Patient Active Problem List   Diagnosis    ED (erectile dysfunction)    Non-proliferative diabetic retinopathy, mild, both eyes    Essential hypertension    Diabetic polyneuropathy    Nonobstructive coronary artery disease of native artery of native heart    Chronic combined systolic and diastolic heart failure    Type 2 diabetes mellitus    SANDRITA (obstructive sleep apnea)    H/O Asthma    Psychophysiological insomnia    Nightmares    Sleep related gastroesophageal reflux disease    Inadequate sleep hygiene    Chest pain    PAF (paroxysmal atrial fibrillation)    At risk for medication noncompliance    Obesity (BMI 30.0-34.9)    Indeterminate stage chronic open angle glaucoma    Right hip pain    Gait instability    Chronic right shoulder pain    Arthritis    Left sided sciatica    Osteoarthritis of spine with radiculopathy, lumbar region    HNP (herniated nucleus pulposus), lumbar    Gastroesophageal reflux disease without esophagitis    Hypogonadism in male    Disorder of parathyroid gland    Hyperlipidemia associated with type 2 diabetes mellitus    Traumatic tear of left rotator cuff    Other chronic pain    Left shoulder pain    Complete tear of left rotator cuff    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 associated  with type 2 diabetes mellitus    Lumbar radiculopathy    Diabetic ulcer of toe of left foot associated with type 2 diabetes mellitus, with necrosis of muscle    Ulcer of left foot    Elevated troponin    Hypotension due to medication    Dermatomyositis    Postprocedural hypotension    Advanced directives, counseling/discussion    Schizoaffective disorder, depressive type    Chronic obstructive pulmonary disease    MDD (major depressive disorder), recurrent episode, moderate    DOMINIK (generalized anxiety disorder)    Bilateral carpal tunnel syndrome    Rotator cuff tear arthropathy of left shoulder    Sacroiliac dysfunction    Atherosclerosis of native coronary artery of native heart with unstable angina pectoris    Coronary vasospasm    Cocaine abuse    Osteopenia    Atherosclerosis of aorta    Cardiac asthma    Chronic gout of multiple sites    Acquired absence of left great toe    Acute on chronic respiratory failure with hypoxia    Hypoxia    Cardiomyopathy    Bilateral carotid artery stenosis    Substance abuse(UDS + for cocaine )    Immunocompromised patient    Other reduced mobility    Abnormality of gait and mobility    Dependence on supplemental oxygen    Positive depression screening    Pulmonary hypertension- echo 7/2022    NICM (nonischemic cardiomyopathy)    Elevated IgE level    Moderate persistent asthma without complication       Medication List with Changes/Refills   Current Medications    ALBUTEROL (PROVENTIL) 2.5 MG /3 ML (0.083 %) NEBULIZER SOLUTION    INHALE THE CONTENTS OF 1 VIAL VIA NEBULIZER EVERY 4 TO 6 HOURS AS NEEDED FOR WHEEZING OR FOR SHORTNESS OF BREATH    ALLOPURINOL (ZYLOPRIM) 100 MG TABLET    Take 1 tablet (100 mg total) by mouth once daily.    AMLODIPINE (NORVASC) 2.5 MG TABLET    Take 1 tablet (2.5 mg total) by mouth once daily.    APIXABAN (ELIQUIS) 5 MG TAB    TAKE 1 TABLET TWICE DAILY    ATORVASTATIN (LIPITOR) 40 MG TABLET    Take 1 tablet (40 mg total) by mouth every evening.     BLOOD SUGAR DIAGNOSTIC STRP    1 each by Misc.(Non-Drug; Combo Route) route 4 (four) times daily.    BLOOD-GLUCOSE METER MISC    Use as instructed to test blood glucose meter daily.    BRINZOLAMIDE-BRIMONIDINE (SIMBRINZA) 1-0.2 % DRPS    Place 1 drop into both eyes 3 (three) times daily.    EMPAGLIFLOZIN (JARDIANCE) 25 MG TABLET    Take 1 tablet (25 mg total) by mouth once daily.    EPINEPHRINE (EPIPEN) 0.3 MG/0.3 ML ATIN    Inject 0.3 mLs (0.3 mg total) into the muscle once. for 1 dose    FLUTICASONE-UMECLIDIN-VILANTER (TRELEGY ELLIPTA) 200-62.5-25 MCG INHALER    Inhale 1 puff into the lungs once daily.    FUROSEMIDE (LASIX) 40 MG TABLET    Take 2 tablets (80 mg total) by mouth 2 (two) times a day.    HYDROCODONE-ACETAMINOPHEN (NORCO)  MG PER TABLET    Take 1 tablet by mouth every 4 (four) hours as needed for Pain.    INSULIN ASPART U-100 (NOVOLOG FLEXPEN U-100 INSULIN) 100 UNIT/ML (3 ML) INPN PEN    INJECT 10 UNITS UNDER THE SKIN THREE TIMES DAILY WITH MEALS    LANCETS (TRUEPLUS LANCETS) 33 GAUGE MISC    Use to check blood sugar twice a day    LATANOPROST 0.005 % OPHTHALMIC SOLUTION    INSTILL 1 DROP INTO BOTH EYES ONE TIME DAILY BOTTLE EXPIRES 42 DAYS AFTER OPENING...    LISINOPRIL (PRINIVIL,ZESTRIL) 30 MG TABLET    TAKE 1 TABLET EVERY DAY.    LISINOPRIL 10 MG TABLET    TAKE 1 TABLET EVERY DAY    NAPROXEN (NAPROSYN) 500 MG TABLET    Take 1 tablet (500 mg total) by mouth 2 (two) times daily with meals.    OMALIZUMAB (XOLAIR) 150 MG/ML INJECTION    Inject 2 mLs (300 mg total) into the skin every 14 (fourteen) days.    OMALIZUMAB (XOLAIR) 75 MG/0.5 ML INJECTION    Inject 0.5 mLs (75 mg total) into the skin every 14 (fourteen) days.    OMEPRAZOLE (PRILOSEC) 20 MG CAPSULE    Take 20 mg by mouth every morning.    PANTOPRAZOLE (PROTONIX) 40 MG TABLET    Take 1 tablet (40 mg total) by mouth once daily.    PAPAVERINE-PHENTOLAMIN-ALPROST 150 MG-5 MG- 50 MCG SOLR    0.5 mLs by Intracavernosal route as needed (ED).     "PEN NEEDLE, DIABETIC (BD ULTRA-FINE SHORT PEN NEEDLE) 31 GAUGE X 5/16" NDLE    1 each by Misc.(Non-Drug; Combo Route) route 3 (three) times daily.    PEP INJECTION    Inject 0.15 ml as directed     For compounding pharmacy use:   Add PAPAVERINE 30 mg  Add PHENTOLAMINE 1 mg  Add ALPROSTADIL 20 mcg    PREDNISONE (DELTASONE) 50 MG TAB    Take 50 mg by mouth.    RANOLAZINE (RANEXA) 1,000 MG TB12    TAKE 1 TABLET TWICE DAILY    SEMAGLUTIDE (OZEMPIC) 2 MG/DOSE (8 MG/3 ML) PNIJ    Inject 2 mg into the skin every 7 days.    SERTRALINE (ZOLOFT) 100 MG TABLET    Take 1 tablet (100 mg total) by mouth every evening.    TAMSULOSIN (FLOMAX) 0.4 MG CAP    Take 1 capsule (0.4 mg total) by mouth once daily.    TRAMADOL (ULTRAM) 50 MG TABLET    Take 50 mg by mouth.    TRAZODONE (DESYREL) 100 MG TABLET    TAKE 2 TABLETS EVERY EVENING    VIOS AEROSOL DELIVERY SYSTEM VIN    USE AS DIRESTED   Changed and/or Refilled Medications    Modified Medication Previous Medication    GABAPENTIN (NEURONTIN) 800 MG TABLET gabapentin (NEURONTIN) 800 MG tablet       Take 1 tablet (800 mg total) by mouth 3 (three) times daily.    TAKE 1 TABLET(800 MG) BY MOUTH THREE TIMES DAILY       Review of patient's allergies indicates:   Allergen Reactions    Protamine Hives     Urticaria, possible upper airway swelling       Past Surgical History:   Procedure Laterality Date    ABLATION OF ARRHYTHMOGENIC FOCUS FOR ATRIAL FIBRILLATION N/A 2/27/2020    Procedure: Ablation atrial fibrillation;  Surgeon: Gio Brown MD;  Location: [facility] EP LAB;  Service: Cardiology;  Laterality: N/A;  afib, DAMIEN (cx if SR), PVI, PITO, anes, MB, 3 Prep    CHOLECYSTECTOMY      CLOSURE OF WOUND Left 7/1/2020    Procedure: CLOSURE, WOUND;  Surgeon: Barb Veras DPM;  Location: [facility] OR;  Service: Podiatry;  Laterality: Left;    COLONOSCOPY N/A 3/4/2022    Procedure: COLONOSCOPY;  Surgeon: Yolis Freitas MD;  Location: [facility] ENDO;  Service: Endoscopy;  Laterality: N/A;    " ESOPHAGOGASTRODUODENOSCOPY N/A 3/4/2022    Procedure: EGD (ESOPHAGOGASTRODUODENOSCOPY);  Surgeon: Yolis Freitas MD;  Location: [facility] ENDO;  Service: Endoscopy;  Laterality: N/A;    INJECTION OF ANESTHETIC AGENT INTO SACROILIAC JOINT Left 3/24/2021    Procedure: Left BLOCK, SACROILIAC JOINT and bilateral rhomboid TPI;  Surgeon: Dillan Tsai MD;  Location: [facility] PAIN MGT;  Service: Pain Management;  Laterality: Left;    INTRALUMINAL GASTROINTESTINAL TRACT IMAGING VIA CAPSULE N/A 3/22/2022    Procedure: IMAGING PROCEDURE, GI TRACT, INTRALUMINAL, VIA CAPSULE;  Surgeon: Justice Martinez RN;  Location: [facility] ENDO;  Service: Endoscopy;  Laterality: N/A;    REVERSE TOTAL SHOULDER ARTHROPLASTY Left 1/10/2022    Procedure: ARTHROPLASTY, SHOULDER, TOTAL, REVERSE;  Surgeon: Anthony Alvarado MD;  Location: [facility] OR;  Service: Orthopedics;  Laterality: Left;  left reverse total shoulder arthroplasty     SELECTIVE INJECTION OF ANESTHETIC AGENT AROUND LUMBAR SPINAL NERVE ROOT BY TRANSFORAMINAL APPROACH Left 6/11/2020    Procedure: BLOCK, SPINAL NERVE ROOT, LUMBAR, SELECTIVE, TRANSFORAMINAL APPROACH Left L4-5, L5-S1 TFESI with RN IV sedation;  Surgeon: Dillan Tsai MD;  Location: [facility] PAIN MGT;  Service: Pain Management;  Laterality: Left;    TOE AMPUTATION Left 6/29/2020    Procedure: AMPUTATION, TOE;  Surgeon: Barb Veras DPM;  Location: [facility] OR;  Service: Podiatry;  Laterality: Left;  great toe       Family History   Problem Relation Age of Onset    Hypertension Mother     Glaucoma Mother     Cataracts Mother     Diabetes Mother     Cataracts Father     Stroke Father     Glaucoma Sister         x3    Cataracts Sister         x3    Diabetes Sister         x1    Stroke Sister         x1    No Known Problems Brother     No Known Problems Daughter     No Known Problems Son     Glaucoma Maternal Aunt     Diabetes Maternal Aunt     Cancer Maternal Grandfather         lung (smoker)    Prostate cancer Neg Hx     Heart disease  Neg Hx     Kidney disease Neg Hx        Social History     Socioeconomic History    Marital status: Legally     Number of children: 5   Occupational History    Occupation: disabled    Occupation: PT at [employer]    Tobacco Use    Smoking status: Never    Smokeless tobacco: Never   Substance and Sexual Activity    Alcohol use: Yes     Comment: rare, maybe holidays    Drug use: Not Currently     Types: Cocaine    Sexual activity: Not Currently     Partners: Female   Social History Narrative    Utilizing [employer] transportation which has been unreliable.      Social Determinants of Health     Financial Resource Strain: Low Risk  (12/7/2022)    Overall Financial Resource Strain (CARDIA)     Difficulty of Paying Living Expenses: Not very hard   Food Insecurity: No Food Insecurity (12/7/2022)    Hunger Vital Sign     Worried About Running Out of Food in the Last Year: Never true     Ran Out of Food in the Last Year: Never true   Transportation Needs: Unmet Transportation Needs (12/7/2022)    PRAPARE - Transportation     Lack of Transportation (Medical): Yes     Lack of Transportation (Non-Medical): Yes   Physical Activity: Inactive (12/7/2022)    Exercise Vital Sign     Days of Exercise per Week: 0 days     Minutes of Exercise per Session: 0 min   Stress: Stress Concern Present (12/7/2022)    [employer] [place] [employer] - Occupational Stress Questionnaire     Feeling of Stress : Rather much   Social Connections: Moderately Isolated (12/7/2022)    Social Connection and Isolation Panel [NHANES]     Frequency of Communication with Friends and Family: Once a week     Frequency of Social Gatherings with Friends and Family: Once a week     Attends [employer] Services: More than 4 times per year     Active Member of Clubs or Organizations: No     Attends Club or Organization Meetings: More than 4 times per year     Marital Status:    Housing Stability: Low Risk  (12/7/2022)    Housing  "Stability Vital Sign     Unable to Pay for Housing in the Last Year: No     Number of Places Lived in the Last Year: 1     Unstable Housing in the Last Year: No       Vitals:    01/10/24 0826   Weight: 90.3 kg (199 lb)   Height: 5' 5" (1.651 m)   PainSc:   9       Hemoglobin A1C   Date Value Ref Range Status   09/29/2023 6.4 (H) 4.0 - 5.6 % Final     Comment:     ADA Screening Guidelines:  5.7-6.4%  Consistent with prediabetes  >or=6.5%  Consistent with diabetes    High levels of fetal hemoglobin interfere with the HbA1C  assay. Heterozygous hemoglobin variants (HbS, HgC, etc)do  not significantly interfere with this assay.   However, presence of multiple variants may affect accuracy.     05/23/2023 6.2 <=6.5 % Final   01/04/2023 6.3 (H) 4.0 - 5.6 % Final     Comment:     ADA Screening Guidelines:  5.7-6.4%  Consistent with prediabetes  >or=6.5%  Consistent with diabetes    High levels of fetal hemoglobin interfere with the HbA1C  assay. Heterozygous hemoglobin variants (HbS, HgC, etc)do  not significantly interfere with this assay.   However, presence of multiple variants may affect accuracy.     07/31/2022 6.0 (H) 4.0 - 5.6 % Final     Comment:     ADA Screening Guidelines:  5.7-6.4%  Consistent with prediabetes  >or=6.5%  Consistent with diabetes    High levels of fetal hemoglobin interfere with the HbA1C  assay. Heterozygous hemoglobin variants (HbS, HgC, etc)do  not significantly interfere with this assay.   However, presence of multiple variants may affect accuracy.         Review of Systems   Constitutional:  Negative for chills and fever.   Respiratory:  Negative for shortness of breath.    Cardiovascular:  Negative for chest pain, palpitations, orthopnea, claudication and leg swelling.   Gastrointestinal:  Negative for diarrhea, nausea and vomiting.   Musculoskeletal:  Negative for joint pain.   Skin:  Negative for rash.   Neurological:  Positive for tingling and sensory change.   Psychiatric/Behavioral: " Negative.               Objective:      PHYSICAL EXAM: Apperance: Alert and orient in no distress,well developed, and with good attention to grooming and body habits  Patient presents ambulating in diabetic shoes and inserts.   LOWER EXTREMITY EXAM:  VASCULAR: Dorsalis pedis pulses 2/4 bilateral and Posterior Tibial pulses 2/4 bilateral. Capillary fill time <4 seconds bilateral. No edema observed bilateral. Varicosities absent bilateral. Skin temperature of the lower extremities is warm to warm, proximal to distal. Hair growth dim bilateral.  DERMATOLOGICAL: No skin rashes, subcutaneous nodules, lesions, or ulcers observed bilateral. Nails 1,2,3,4,5 right and 2,3,4,5 left elongated, thickened, and discolored with subungual debris. Webspaces 1,2,3,4 bilateral clean, dry and without evidence of break in skin integrity. Mild hyperkeratotic tissue noted to left medial 1st MPJ and distal hallux amputation stump.   NEUROLOGICAL: Light touch, sharp-dull, proprioception all present and equal bilaterally.  Vibratory sensation diminished at bilateral hallux. Protective sensation absent sites as tested with a [place]-Kimmie 5.07 monofilament.   MUSCULOSKELETAL: Muscle strength is 5/5 for foot inverters, everters, plantarflexors, and dorsiflexors. Muscle tone is normal. Left hallux amputation noted.           Assessment:       ICD-10-CM ICD-9-CM   1. Type II diabetes mellitus with neurological manifestations  E11.49 250.60   2. HNP (herniated nucleus pulposus), lumbar  M51.26 722.10   3. Pre-ulcerative calluses - Left Foot  L84 700   4. Dermatophytosis of nail  B35.1 110.1         Plan:   Type II diabetes mellitus with neurological manifestations  -     gabapentin (NEURONTIN) 800 MG tablet; Take 1 tablet (800 mg total) by mouth 3 (three) times daily.  Dispense: 270 tablet; Refill: 4    HNP (herniated nucleus pulposus), lumbar  -     gabapentin (NEURONTIN) 800 MG tablet; Take 1 tablet (800 mg total) by mouth 3 (three) times  daily.  Dispense: 270 tablet; Refill: 4    Pre-ulcerative calluses - Left Foot    Dermatophytosis of nail      I counseled the patient on his conditions, regarding findings of my examination, my impressions, and usual treatment plan.   Appointment spent on education about the diabetic foot, neuropathy, and prevention of limb loss.  Shoe inspection. Diabetic Foot Education. Patient reminded of the importance of good nutrition and blood sugar control to help prevent podiatric complications of diabetes. Patient instructed on proper foot hygeine. We discussed wearing proper shoe gear, daily foot inspections, never walking without protective shoe gear, never putting sharp instruments to feet.    With patient's permission, nails 1-5 bilateral were debrided/trimmed in length and thickness aggressively to their soft tissue attachment mechanically and with electric , removing all offending nail and debris. Patient relates relief following the procedure.  With patient's permission, left foot pre-ulcerative callus trimmed in thickness with #15 blade in thickness without incident.   Refill completed for Gabapentin 800mg to be taken 3 times daily.   Patient  will continue to monitor the areas daily, inspect feet, wear protective shoe gear when ambulatory, moisturizer to maintain skin integrity. Patient reminded of the importance of good nutrition and blood sugar control to help prevent podiatric complications of diabetes.  Patient to return 4 months or sooner if needed.     .        Barb Veras DPM  [facility]

## 2024-01-10 NOTE — ED NOTES
Pt resting in bed. NAD, VSS, RR equal and unlabored. Will continue to monitor.   > c/w home pantoprazole

## 2024-01-12 DIAGNOSIS — E11.49 TYPE II DIABETES MELLITUS WITH NEUROLOGICAL MANIFESTATIONS: ICD-10-CM

## 2024-01-12 DIAGNOSIS — M51.26 HNP (HERNIATED NUCLEUS PULPOSUS), LUMBAR: ICD-10-CM

## 2024-01-12 RX ORDER — GABAPENTIN 800 MG/1
800 TABLET ORAL 3 TIMES DAILY
Qty: 270 TABLET | Refills: 4 | Status: SHIPPED | OUTPATIENT
Start: 2024-01-12

## 2024-01-16 ENCOUNTER — DOCUMENT SCAN (OUTPATIENT)
Dept: HOME HEALTH SERVICES | Facility: HOSPITAL | Age: 69
End: 2024-01-16
Payer: MEDICARE

## 2024-01-18 ENCOUNTER — HOSPITAL ENCOUNTER (EMERGENCY)
Facility: HOSPITAL | Age: 69
Discharge: HOME | End: 2024-01-18
Attending: EMERGENCY MEDICINE
Payer: MEDICARE

## 2024-01-18 ENCOUNTER — TELEPHONE (OUTPATIENT)
Dept: INTERNAL MEDICINE | Facility: CLINIC | Age: 69
End: 2024-01-18
Payer: MEDICARE

## 2024-01-18 VITALS
RESPIRATION RATE: 17 BRPM | DIASTOLIC BLOOD PRESSURE: 69 MMHG | HEART RATE: 84 BPM | TEMPERATURE: 98 F | SYSTOLIC BLOOD PRESSURE: 135 MMHG | OXYGEN SATURATION: 94 %

## 2024-01-18 DIAGNOSIS — E11.65 UNCONTROLLED TYPE 2 DIABETES MELLITUS WITH HYPERGLYCEMIA: ICD-10-CM

## 2024-01-18 DIAGNOSIS — M25.569 KNEE PAIN: ICD-10-CM

## 2024-01-18 DIAGNOSIS — M25.529 ELBOW PAIN: ICD-10-CM
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
## 2024-01-18 PROCEDURE — 99284 EMERGENCY DEPT VISIT MOD MDM: CPT

## 2024-01-18 RX ORDER — LANCETS
1 EACH MISCELLANEOUS 4 TIMES DAILY
Qty: 200 EACH | Refills: 0 | Status: SHIPPED | OUTPATIENT
Start: 2024-01-18 | End: 2024-03-15

## 2024-01-18 RX ORDER — DEXTROSE 4 G
TABLET,CHEWABLE ORAL
Qty: 1 EACH | Refills: 0 | Status: SHIPPED | OUTPATIENT
Start: 2024-01-18

## 2024-01-18 RX ORDER — ISOPROPYL ALCOHOL 70 ML/100ML
1 SWAB TOPICAL 4 TIMES DAILY
Qty: 120 EACH | Refills: 0 | Status: SHIPPED | OUTPATIENT
Start: 2024-01-18 | End: 2024-02-17

## 2024-01-18 RX ORDER — HYDROCODONE BITARTRATE AND ACETAMINOPHEN 7.5; 325 MG/1; MG/1
1 TABLET ORAL EVERY 6 HOURS PRN
Qty: 11 TABLET | Refills: 0 | Status: SHIPPED | OUTPATIENT
Start: 2024-01-18 | End: 2024-01-23

## 2024-01-18 NOTE — TELEPHONE ENCOUNTER
Spoke with [facility]. Advised that pt be evaluated in ER. Per pt was advised to go to ER.//ddw

## 2024-01-18 NOTE — TELEPHONE ENCOUNTER
----- Message from Cami Hull sent at 1/18/2024  1:05 PM CST -----  Melania with [facility] called in this afternoon stating the patient cannot check his blood sugar with his new phone. She would like to know can you call in a new blood sugar meter test strips alcohol pads and lancets. Please call back [phone]        [id number] - [place] - [address]  [address] [address]  Phone: [phone] Fax: [phone]

## 2024-01-18 NOTE — ED PROVIDER NOTES
SCRIBE #1 NOTE: I, Johny Zambranou, am scribing for, and in the presence of, Shantanu Norwood MD. I have scribed the entire note.      History      Chief Complaint   Patient presents with    Fall     Slipped and fell on ice two days ago per pt. Denies hitting head, LOC, + Blood thinners. Hx of chronic back pain, pt states he has not been able to get out of bed since the fall, but was ambulatory on scene per EMS.        Review of patient's allergies indicates:   Allergen Reactions    Protamine Hives     Urticaria, possible upper airway swelling        HPI   HPI    1/18/2024, 2:05 PM   History obtained from the patient      History of Present Illness: Crow Green is a 68 y.o. male patient who presents to the Emergency Department for evaluation following a fall 2 days PTA. Pt states that he slipped on ice and fell forward. He c/o L elbow pain, posterior neck pain, back pain, and R knee pain. Symptoms are constant and moderate in severity. No mitigating or exacerbating factors reported. Patient denies any LOC, fever, chills, n/v/d, SOB, CP, weakness, numbness, dizziness, and all other sxs at this time. No prior Tx reported. No further complaints or concerns at this time.     Arrival mode: [facility]    PCP: Cm Polanco MD       Past Medical History:  Past Medical History:   Diagnosis Date    Arthritis     Asthma     Atrial fibrillation     Atrial fibrillation with RVR 08/07/2019    Carotid artery stenosis     CHF (congestive heart failure)     Chronic obstructive pulmonary disease 08/05/2020    Depression     Dermatomyositis     Diabetes mellitus, type 2 Diagnosed in 2000    Elevated PSA     Follow up 07/30/2020    Hypertension     Myocardial infarction     2017    Sleep apnea        Past Surgical History:  Past Surgical History:   Procedure Laterality Date    ABLATION OF ARRHYTHMOGENIC FOCUS FOR ATRIAL FIBRILLATION N/A 2/27/2020    Procedure: Ablation atrial fibrillation;  Surgeon: Gio Brown MD;   Location: [facility] EP LAB;  Service: Cardiology;  Laterality: N/A;  afib, DAMIEN (cx if SR), PVI, PITO, anes, MB, 3 Prep    CHOLECYSTECTOMY      CLOSURE OF WOUND Left 7/1/2020    Procedure: CLOSURE, WOUND;  Surgeon: Barb Veras DPM;  Location: [facility];  Service: Podiatry;  Laterality: Left;    COLONOSCOPY N/A 3/4/2022    Procedure: COLONOSCOPY;  Surgeon: Yolis Freitas MD;  Location: [facility];  Service: Endoscopy;  Laterality: N/A;    ESOPHAGOGASTRODUODENOSCOPY N/A 3/4/2022    Procedure: EGD (ESOPHAGOGASTRODUODENOSCOPY);  Surgeon: Yolis Freitas MD;  Location: [facility];  Service: Endoscopy;  Laterality: N/A;    INJECTION OF ANESTHETIC AGENT INTO SACROILIAC JOINT Left 3/24/2021    Procedure: Left BLOCK, SACROILIAC JOINT and bilateral rhomboid TPI;  Surgeon: Dillan Tsai MD;  [facility];  Service: Pain Management;  Laterality: Left;    INTRALUMINAL GASTROINTESTINAL TRACT IMAGING VIA CAPSULE N/A 3/22/2022    Procedure: IMAGING PROCEDURE, GI TRACT, INTRALUMINAL, VIA CAPSULE;  Surgeon: Justice Martinez RN;  Location: [facility];  Service: Endoscopy;  Laterality: N/A;    REVERSE TOTAL SHOULDER ARTHROPLASTY Left 1/10/2022    Procedure: ARTHROPLASTY, SHOULDER, TOTAL, REVERSE;  Surgeon: Anthony Alvarado MD;  Location: [facility];  Service: Orthopedics;  Laterality: Left;  left reverse total shoulder arthroplasty     SELECTIVE INJECTION OF ANESTHETIC AGENT AROUND LUMBAR SPINAL NERVE ROOT BY TRANSFORAMINAL APPROACH Left 6/11/2020    Procedure: BLOCK, SPINAL NERVE ROOT, LUMBAR, SELECTIVE, TRANSFORAMINAL APPROACH Left L4-5, L5-S1 TFESI with RN IV sedation;  Surgeon: Dillan Tsai MD;  Location: [facility] PAIN MGT;  Service: Pain Management;  Laterality: Left;    TOE AMPUTATION Left 6/29/2020    Procedure: AMPUTATION, TOE;  Surgeon: Barb Veras DPM;  Location: [facility];  Service: Podiatry;  Laterality: Left;  great toe         Family History:  Family History   Problem Relation Age of Onset    Hypertension  Mother     Glaucoma Mother     Cataracts Mother     Diabetes Mother     Cataracts Father     Stroke Father     Glaucoma Sister         x3    Cataracts Sister         x3    Diabetes Sister         x1    Stroke Sister         x1    No Known Problems Brother     No Known Problems Daughter     No Known Problems Son     Glaucoma Maternal Aunt     Diabetes Maternal Aunt     Cancer Maternal Grandfather         lung (smoker)    Prostate cancer Neg Hx     Heart disease Neg Hx     Kidney disease Neg Hx        Social History:  Social History     Tobacco Use    Smoking status: Never    Smokeless tobacco: Never   Substance and Sexual Activity    Alcohol use: Yes     Comment: rare, maybe holidays    Drug use: Not Currently     Types: Cocaine    Sexual activity: Not Currently     Partners: Female       ROS   Review of Systems   Constitutional:  Negative for chills and fever.   HENT:  Negative for sore throat.    Respiratory:  Negative for shortness of breath.    Cardiovascular:  Negative for chest pain.   Gastrointestinal:  Negative for diarrhea, nausea and vomiting.   Genitourinary:  Negative for dysuria.   Musculoskeletal:  Positive for arthralgias (L elbow, R knee), back pain and neck pain (posterior).   Skin:  Negative for rash.   Neurological:  Negative for dizziness, syncope, weakness and numbness.   Hematological:  Does not bruise/bleed easily.   All other systems reviewed and are negative.    Physical Exam      Initial Vitals [01/18/24 1400]   BP Pulse Resp Temp SpO2   (!) 145/86 81 18 98.4 °F (36.9 °C) (!) 94 %      MAP       --          Physical Exam  Nursing Notes and Vital Signs Reviewed.  Constitutional: Patient is in no acute distress. Well-developed and well-nourished.  Head: Atraumatic. Normocephalic.  Eyes: PERRL. EOM intact. Conjunctivae are not pale. No scleral icterus.  ENT: Mucous membranes are moist. Oropharynx is clear and symmetric.    Neck: Supple. Full ROM. No cervical midline bony tenderness,  deformities, or step-offs.   Cardiovascular: Regular rate. Regular rhythm. No murmurs, rubs, or gallops. Distal pulses are 2+ and symmetric.  Pulmonary/Chest: No respiratory distress. Clear to auscultation bilaterally. No wheezing or rales.  Abdominal: Soft and non-distended.  There is no tenderness.  No rebound, guarding, or rigidity.   Musculoskeletal: Moves all extremities. No obvious deformities. No edema.  Back: No midline bony tenderness, deformities, or step-offs of the T-spine or L-spine. Skin appears normal without abrasions or bruising. No erythema, induration, or fluctuance.   Skin: Warm and dry.  Neurological:  Alert, awake, and appropriate.  Normal speech.  No acute focal neurological deficits are appreciated.  Psychiatric: Normal affect. Good eye contact. Appropriate in content.    ED Course    Procedures  ED Vital Signs:  Vitals:    01/18/24 1400 01/18/24 1450 01/18/24 1502   BP: (!) 145/86 118/69 135/69   Pulse: 81 79 84   Resp: 18 16 17   Temp: 98.4 °F (36.9 °C)     TempSrc: Oral     SpO2: (!) 94% (!) 92% (!) 94%       Abnormal Lab Results:  Labs Reviewed - No data to display     Imaging Results:  Imaging Results              X-Ray Elbow Complete Left (Final result)  Result time 01/18/24 14:45:53      Final result by Jose Mishra MD (01/18/24 14:45:53)                   Impression:      No acute findings identified.      Electronically signed by: Jose Mishra  Date:    01/18/2024  Time:    14:45               Narrative:    EXAMINATION:  XR ELBOW COMPLETE 3 VIEW LEFT    CLINICAL HISTORY:  Pain in unspecified elbow    TECHNIQUE:  Four view left elbow series.    COMPARISON:  None    FINDINGS:  Bone density and architecture are normal.  No acute findings.                                       X-Ray Cervical Spine AP And Lateral (Final result)  Result time 01/18/24 14:46:45      Final result by Jose iMshra MD (01/18/24 14:46:45)                   Impression:      No acute findings identified.  Limited  visualization of the C7-T1 level secondary to patient's shoulder prostheses.      Electronically signed by: Jose Mishra  Date:    01/18/2024  Time:    14:46               Narrative:    EXAMINATION:  XR CERVICAL SPINE AP LATERAL    CLINICAL HISTORY:  Pain cervical (723.1);    COMPARISON:  None    TECHNIQUE:  Four view cervical spine series    FINDINGS:  Disc space narrowing and arthritic change at C5-6 and C6-7.  No fracture is identified.  Multilevel osteoarthritis.  Extensive carotid atherosclerosis.                                       X-Ray Lumbar Spine Ap And Lateral (Final result)  Result time 01/18/24 14:47:33      Final result by Jose Mishra MD (01/18/24 14:47:33)                   Impression:      No acute findings identified.      Electronically signed by: Jose Mishra  Date:    01/18/2024  Time:    14:47               Narrative:    EXAMINATION:  XR LUMBAR SPINE AP AND LATERAL    CLINICAL HISTORY:  back pain;    TECHNIQUE:  Three-view lumbar spine x-ray series    COMPARISON:  12/22/2020    FINDINGS:  No fracture or subluxation is identified.  Marked disc space narrowing at L5-S1.  Multilevel osteophyte formation is appreciated.                                       X-Ray Knee Complete 4 Or More Views Right (Final result)  Result time 01/18/24 14:44:39      Final result by Joes Mishra MD (01/18/24 14:44:39)                   Impression:      No acute findings.      Electronically signed by: Jose Mishra  Date:    01/18/2024  Time:    14:44               Narrative:    EXAMINATION:  XR KNEE COMP 4 OR MORE VIEWS RIGHT    CLINICAL HISTORY:  Pain in unspecified knee    TECHNIQUE:  Four view left knee series    COMPARISON:  None    FINDINGS:  No fracture or dislocation.  Mild osteoarthritis.  Atherosclerotic calcifications.                                              The Emergency Provider reviewed the vital signs and test results, which are outlined above.    ED Discussion     2:57 PM: Reassessed pt at this  time. Discussed with pt all pertinent ED information and results. Discussed pt dx and plan of tx. Gave pt all f/u and return to the ED instructions. All questions and concerns were addressed at this time. Pt expresses understanding of information and instructions, and is comfortable with plan to discharge. Pt is stable for discharge.    I discussed with patient and/or family/caretaker that evaluation in the ED does not suggest any emergent or life threatening medical conditions requiring immediate intervention beyond what was provided in the ED, and I believe patient is safe for discharge.  Regardless, an unremarkable evaluation in the ED does not preclude the development or presence of a serious of life threatening condition. As such, patient was instructed to return immediately for any worsening or change in current symptoms.         ED Medication(s):  Medications - No data to display     Follow-up Information       Cm Polanco MD.    Specialty: Family Medicine  Contact information:  [address]  [place] [id number] [phone]                           New Prescriptions    ALCOHOL SWABS (ALCOHOL PADS) PADM    Apply 1 each topically 4 (four) times daily.    HYDROCODONE-ACETAMINOPHEN (NORCO) 7.5-325 MG PER TABLET    Take 1 tablet by mouth every 6 (six) hours as needed.    LANCETS MISC    1 each by Misc.(Non-Drug; Combo Route) route 4 (four) times daily. Insurance preferred         Medical Decision Making    Medical Decision Making  Slipped on Ice two days aog, complaining of continued back and knee pain'    Ddx: knee pain, back pain, fall    Amount and/or Complexity of Data Reviewed  Radiology: ordered. Decision-making details documented in ED Course.    Risk  Prescription drug management.                Scribe Attestation:   Scribe #1: I performed the above scribed service and the documentation accurately describes the services I performed. I attest to the accuracy of the note.    Attending:    Physician Attestation Statement for Scribe #1: I, Shantanu Norwood MD, personally performed the services described in this documentation, as scribed by Johny Case, in my presence, and it is both accurate and complete.          Clinical Impression       ICD-10-CM ICD-9-CM   1. Fall, initial encounter  W19.XXXA E888.9   2. Knee pain  M25.569 719.46   3. Elbow pain  M25.529 719.42       Disposition:   Disposition: Discharged  Condition: Stable         Shantanu Norwood MD  01/18/24 1524

## 2024-01-18 NOTE — TELEPHONE ENCOUNTER
----- Message from Cami Hull sent at 1/18/2024  1:02 PM CST -----  Melania COREY called in this afternoon stating that the patient fell on Tuesday and now is complaining of pain i both bilateral pain through the body from neck down. Please call back [phone] thanks tpw

## 2024-01-22 ENCOUNTER — TELEPHONE (OUTPATIENT)
Dept: INTERNAL MEDICINE | Facility: CLINIC | Age: 69
End: 2024-01-22
Payer: MEDICARE

## 2024-01-22 NOTE — TELEPHONE ENCOUNTER
----- Message from Beckyalfredoog Stefan sent at 1/22/2024 10:37 AM CST -----  Type: Patient Call Back         Who called: SHAGGY JASON [[id number]]         What is the request in detail: Pt states his back and neck have pain from a fall and he is requesting pain meds. Please advise         [id number] - [facility] [place] - [address] AT [place] [facility]  [address] [place] [address]  Phone: [phone] Fax: [phone]             Can the clinic reply by [facility]? No         Would the patient rather a call back or a response via [facility]? Call back         Best call back number: Telephone Information:  Mobile          [phone]             Additional Information:         Thank you.

## 2024-01-22 NOTE — TELEPHONE ENCOUNTER
----- Message from Yenifer Rea sent at 1/22/2024  2:31 PM CST -----  Contact: [phone]  Patient is returning a phone call.  Who left a message for the patient: Kimberly  Does patient know what this is regarding:  missed call  Would you like a call back, or a response through your [facility] portal?:   call  Comments:

## 2024-01-22 NOTE — TELEPHONE ENCOUNTER
----- Message from Ronaldo Marie sent at 1/22/2024  3:27 PM CST -----  Type:  Patient Returning Call    Who Called:PT   Who Left Message for Patient:IGGY  Does the patient know what this is regarding?:YES  Would the patient rather a call back or a response via [facility]? CALL  Best Call Back Number:.Telephone Information:  Mobile          [phone]      Additional Information: Pt returning missed call. Please advise thank you

## 2024-01-24 ENCOUNTER — EXTERNAL HOME HEALTH (OUTPATIENT)
Dept: HOME HEALTH SERVICES | Facility: HOSPITAL | Age: 69
End: 2024-01-24
Payer: MEDICARE

## 2024-01-25 DIAGNOSIS — E11.65 UNCONTROLLED TYPE 2 DIABETES MELLITUS WITH HYPERGLYCEMIA: ICD-10-CM

## 2024-01-25 RX ORDER — LANCETS 33 GAUGE
EACH MISCELLANEOUS
Qty: 300 EACH | OUTPATIENT
Start: 2024-01-25

## 2024-01-29 ENCOUNTER — HOSPITAL ENCOUNTER (OUTPATIENT)
Dept: RADIOLOGY | Facility: HOSPITAL | Age: 69
Discharge: HOME | End: 2024-01-29
Attending: PHYSICIAN ASSISTANT
Payer: MEDICARE

## 2024-01-29 ENCOUNTER — OFFICE VISIT (OUTPATIENT)
Dept: INTERNAL MEDICINE | Facility: CLINIC | Age: 69
End: 2024-01-29
Payer: MEDICARE

## 2024-01-29 VITALS
DIASTOLIC BLOOD PRESSURE: 70 MMHG | OXYGEN SATURATION: 98 % | HEART RATE: 92 BPM | WEIGHT: 194 LBS | SYSTOLIC BLOOD PRESSURE: 120 MMHG | TEMPERATURE: 98 F | BODY MASS INDEX: 32.32 KG/M2 | HEIGHT: 65 IN

## 2024-01-29 DIAGNOSIS — G89.29 CHRONIC PAIN OF BOTH SHOULDERS: Primary | ICD-10-CM

## 2024-01-29 DIAGNOSIS — G44.52 NEW DAILY PERSISTENT HEADACHE: ICD-10-CM

## 2024-01-29 DIAGNOSIS — M25.512 CHRONIC PAIN OF BOTH SHOULDERS: ICD-10-CM

## 2024-01-29 DIAGNOSIS — G89.29 CHRONIC PAIN OF BOTH SHOULDERS: ICD-10-CM

## 2024-01-29 DIAGNOSIS — M25.512 CHRONIC PAIN OF BOTH SHOULDERS: Primary | ICD-10-CM

## 2024-01-29 DIAGNOSIS — M25.511 CHRONIC PAIN OF BOTH SHOULDERS: ICD-10-CM

## 2024-01-29 DIAGNOSIS — M54.2 CERVICALGIA: ICD-10-CM

## 2024-01-29 DIAGNOSIS — G89.29 OTHER CHRONIC PAIN: ICD-10-CM

## 2024-01-29 DIAGNOSIS — M25.511 CHRONIC PAIN OF BOTH SHOULDERS: Primary | ICD-10-CM

## 2024-01-29 PROCEDURE — 73030 X-RAY EXAM OF SHOULDER: CPT | Mod: 26,50,, | Performed by: RADIOLOGY

## 2024-01-29 PROCEDURE — 3074F SYST BP LT 130 MM HG: CPT | Mod: CPTII,S$GLB,, | Performed by: PHYSICIAN ASSISTANT

## 2024-01-29 PROCEDURE — 99999 PR PBB SHADOW E&M-EST. PATIENT-LVL V: CPT | Mod: PBBFAC,,, | Performed by: PHYSICIAN ASSISTANT

## 2024-01-29 PROCEDURE — 1159F MED LIST DOCD IN RCRD: CPT | Mod: CPTII,S$GLB,, | Performed by: PHYSICIAN ASSISTANT

## 2024-01-29 PROCEDURE — 1125F AMNT PAIN NOTED PAIN PRSNT: CPT | Mod: CPTII,S$GLB,, | Performed by: PHYSICIAN ASSISTANT

## 2024-01-29 PROCEDURE — 1160F RVW MEDS BY RX/DR IN RCRD: CPT | Mod: CPTII,S$GLB,, | Performed by: PHYSICIAN ASSISTANT

## 2024-01-29 PROCEDURE — 73030 X-RAY EXAM OF SHOULDER: CPT | Mod: TC,50

## 2024-01-29 PROCEDURE — 99214 OFFICE O/P EST MOD 30 MIN: CPT | Mod: S$GLB,,, | Performed by: PHYSICIAN ASSISTANT

## 2024-01-29 PROCEDURE — 3008F BODY MASS INDEX DOCD: CPT | Mod: CPTII,S$GLB,, | Performed by: PHYSICIAN ASSISTANT

## 2024-01-29 PROCEDURE — 3078F DIAST BP <80 MM HG: CPT | Mod: CPTII,S$GLB,, | Performed by: PHYSICIAN ASSISTANT

## 2024-01-29 RX ORDER — LANOLIN ALCOHOL/MO/W.PET/CERES
1 CREAM (GRAM) TOPICAL DAILY
COMMUNITY
Start: 2024-01-01

## 2024-01-29 RX ORDER — GLUCOSA SU 2KCL/CHONDROITIN SU 500-400 MG
1 CAPSULE ORAL DAILY
COMMUNITY
Start: 2024-01-01

## 2024-01-29 NOTE — PROGRESS NOTES
Subjective:      Patient ID: Crow Green is a 68 y.o. male.    Chief Complaint: Pain    Pain  This is a chronic problem. The problem has been gradually worsening. Associated symptoms include arthralgias, headaches, myalgias, neck pain, numbness and weakness. Pertinent negatives include no abdominal pain, anorexia, change in bowel habit, chest pain, chills, congestion, coughing, diaphoresis, fatigue, fever, joint swelling, nausea, rash, sore throat, swollen glands, urinary symptoms, vertigo, visual change or vomiting. He has tried acetaminophen, ice, heat and oral narcotics for the symptoms. The treatment provided mild relief.     Here today for evaluation of severe pain in upper back and shoulders. Reports reduced ROM in bilateral shoulders. Pain worse after a fall earlier this month. Slipped on ice. Went to ER. Xrays negative for anything acute.   No improvement with norco or lidocaine patches at home. No improvement with tylenol.     Headaches on the left side of his head (temporal region). H/o temp artery biopsy in the past in 2017 which was negative. Pt reports the at the headaches start in the morning and improve throughout the day. They have been coming on every day. Not worst headache or his life or 10/10 pain.     Patient Active Problem List   Diagnosis    ED (erectile dysfunction)    Non-proliferative diabetic retinopathy, mild, both eyes    Essential hypertension    Diabetic polyneuropathy    Nonobstructive coronary artery disease of native artery of native heart    Chronic combined systolic and diastolic heart failure    Type 2 diabetes mellitus    SANDRITA (obstructive sleep apnea)    H/O Asthma    Psychophysiological insomnia    Nightmares    Sleep related gastroesophageal reflux disease    Inadequate sleep hygiene    Chest pain    PAF (paroxysmal atrial fibrillation)    At risk for medication noncompliance    Obesity (BMI 30.0-34.9)    Indeterminate stage chronic open angle glaucoma    Right hip  pain    Gait instability    Chronic right shoulder pain    Arthritis    Left sided sciatica    Osteoarthritis of spine with radiculopathy, lumbar region    HNP (herniated nucleus pulposus), lumbar    Gastroesophageal reflux disease without esophagitis    Hypogonadism in male    Disorder of parathyroid gland    Hyperlipidemia associated with type 2 diabetes mellitus    Traumatic tear of left rotator cuff    Other chronic pain    Left shoulder pain    Complete tear of left rotator cuff    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 associated with type 2 diabetes mellitus    Lumbar radiculopathy    Diabetic ulcer of toe of left foot associated with type 2 diabetes mellitus, with necrosis of muscle    Ulcer of left foot    Elevated troponin    Hypotension due to medication    Dermatomyositis    Postprocedural hypotension    Advanced directives, counseling/discussion    Schizoaffective disorder, depressive type    Chronic obstructive pulmonary disease    MDD (major depressive disorder), recurrent episode, moderate    DOMINIK (generalized anxiety disorder)    Bilateral carpal tunnel syndrome    Rotator cuff tear arthropathy of left shoulder    Sacroiliac dysfunction    Atherosclerosis of native coronary artery of native heart with unstable angina pectoris    Coronary vasospasm    Cocaine abuse    Osteopenia    Atherosclerosis of aorta    Cardiac asthma    Chronic gout of multiple sites    Acquired absence of left great toe    Acute on chronic respiratory failure with hypoxia    Hypoxia    Cardiomyopathy    Bilateral carotid artery stenosis    Substance abuse(UDS + for cocaine )    Immunocompromised patient    Other reduced mobility    Abnormality of gait and mobility    Dependence on supplemental oxygen    Positive depression screening    Pulmonary hypertension- echo 7/2022    NICM (nonischemic cardiomyopathy)    Elevated IgE level    Moderate persistent asthma without complication          Current Outpatient Medications:     albuterol (PROVENTIL) 2.5 mg /3 mL (0.083 %) nebulizer solution, INHALE THE CONTENTS OF 1 VIAL VIA NEBULIZER EVERY 4 TO 6 HOURS AS NEEDED FOR WHEEZING OR FOR SHORTNESS OF BREATH, Disp: 1080 mL, Rfl: 3    alcohol swabs (ALCOHOL PADS) PadM, Apply 1 each topically 4 (four) times daily., Disp: 120 each, Rfl: 0    allopurinoL (ZYLOPRIM) 100 MG tablet, Take 1 tablet (100 mg total) by mouth once daily., Disp: 90 tablet, Rfl: 3    amLODIPine (NORVASC) 2.5 MG tablet, Take 1 tablet (2.5 mg total) by mouth once daily., Disp: 90 tablet, Rfl: 3    apixaban (ELIQUIS) 5 mg Tab, TAKE 1 TABLET TWICE DAILY, Disp: 180 tablet, Rfl: 2    atorvastatin (LIPITOR) 40 MG tablet, Take 1 tablet (40 mg total) by mouth every evening., Disp: 90 tablet, Rfl: 3    blood sugar diagnostic Strp, 1 each by Misc.(Non-Drug; Combo Route) route 4 (four) times daily., Disp: 200 each, Rfl: 0    blood-glucose meter Misc, Use as instructed to test blood glucose meter daily., Disp: 1 each, Rfl: 0    brinzolamide-brimonidine (SIMBRINZA) 1-0.2 % DrpS, Place 1 drop into both eyes 3 (three) times daily., Disp: 8 mL, Rfl: 6    cyanocobalamin (VITAMIN B-12) 1000 MCG tablet, Take 1 tablet by mouth once daily., Disp: , Rfl:     empagliflozin (JARDIANCE) 25 mg tablet, Take 1 tablet (25 mg total) by mouth once daily., Disp: 90 tablet, Rfl: 3    fluticasone-umeclidin-vilanter (TRELEGY ELLIPTA) 200-62.5-25 mcg inhaler, Inhale 1 puff into the lungs once daily., Disp: 360 each, Rfl: 5    furosemide (LASIX) 40 MG tablet, Take 2 tablets (80 mg total) by mouth 2 (two) times a day., Disp: 120 tablet, Rfl: 10    gabapentin (NEURONTIN) 800 MG tablet, Take 1 tablet (800 mg total) by mouth 3 (three) times daily., Disp: 270 tablet, Rfl: 4    insulin aspart U-100 (NOVOLOG FLEXPEN U-100 INSULIN) 100 unit/mL (3 mL) InPn pen, INJECT 10 UNITS UNDER THE SKIN THREE TIMES DAILY WITH MEALS, Disp: 15 mL, Rfl: 10    INV allopurinol tablet, Take 1  "tablet by mouth once daily., Disp: , Rfl:     lancets (TRUEPLUS LANCETS) 33 gauge Misc, Use to check blood sugar twice a day, Disp: 100 each, Rfl: 6    lancets Misc, 1 each by Misc.(Non-Drug; Combo Route) route 4 (four) times daily. Insurance preferred, Disp: 200 each, Rfl: 0    latanoprost 0.005 % ophthalmic solution, INSTILL 1 DROP INTO BOTH EYES ONE TIME DAILY BOTTLE EXPIRES 42 DAYS AFTER OPENING..., Disp: 7.5 mL, Rfl: 3    lisinopriL (PRINIVIL,ZESTRIL) 30 MG tablet, TAKE 1 TABLET EVERY DAY., Disp: 90 tablet, Rfl: 3    lisinopriL 10 MG tablet, TAKE 1 TABLET EVERY DAY, Disp: 90 tablet, Rfl: 3    multivitamin-iron-folic acid (SENTRY) Tab, Take 1 tablet by mouth once daily., Disp: , Rfl:     naproxen (NAPROSYN) 500 MG tablet, Take 1 tablet (500 mg total) by mouth 2 (two) times daily with meals., Disp: 20 tablet, Rfl: 0    omalizumab (XOLAIR) 150 mg/mL injection, Inject 2 mLs (300 mg total) into the skin every 14 (fourteen) days., Disp: 4 each, Rfl: 11    omalizumab (XOLAIR) 75 mg/0.5 mL injection, Inject 0.5 mLs (75 mg total) into the skin every 14 (fourteen) days., Disp: 2 each, Rfl: 11    omeprazole (PRILOSEC) 20 MG capsule, Take 20 mg by mouth every morning., Disp: , Rfl:     pantoprazole (PROTONIX) 40 MG tablet, Take 1 tablet (40 mg total) by mouth once daily., Disp: 90 tablet, Rfl: 1    papaverine-phentolamin-alprost 150 mg-5 mg- 50 mcg SolR, 0.5 mLs by Intracavernosal route as needed (ED)., Disp: 10 each, Rfl: ml    pen needle, diabetic (BD ULTRA-FINE SHORT PEN NEEDLE) 31 gauge x 5/16" Ndle, 1 each by Misc.(Non-Drug; Combo Route) route 3 (three) times daily., Disp: 300 each, Rfl: 3    pep injection, Inject 0.15 ml as directed   For compounding pharmacy use:  Add PAPAVERINE 30 mg Add PHENTOLAMINE 1 mg Add ALPROSTADIL 20 mcg, Disp: 1 vial, Rfl: 5    predniSONE (DELTASONE) 50 MG Tab, Take 50 mg by mouth., Disp: , Rfl:     ranolazine (RANEXA) 1,000 mg Tb12, TAKE 1 TABLET TWICE DAILY, Disp: 180 tablet, Rfl: 2    " semaglutide (OZEMPIC) 2 mg/dose (8 mg/3 mL) PnIj, Inject 2 mg into the skin every 7 days., Disp: 9 mL, Rfl: 3    sertraline (ZOLOFT) 100 MG tablet, Take 1 tablet (100 mg total) by mouth every evening., Disp: 90 tablet, Rfl: 4    traMADoL (ULTRAM) 50 mg tablet, Take 50 mg by mouth., Disp: , Rfl:     traZODone (DESYREL) 100 MG tablet, TAKE 2 TABLETS EVERY EVENING, Disp: 180 tablet, Rfl: 3    VIOS AEROSOL DELIVERY SYSTEM Shefali, USE AS DIRESTED, Disp: , Rfl: 0    EPINEPHrine (EPIPEN) 0.3 mg/0.3 mL AtIn, Inject 0.3 mLs (0.3 mg total) into the muscle once. for 1 dose, Disp: 2 each, Rfl: 3    tamsulosin (FLOMAX) 0.4 mg Cap, Take 1 capsule (0.4 mg total) by mouth once daily., Disp: 90 capsule, Rfl: 1    Review of Systems   Constitutional:  Negative for activity change, appetite change, chills, diaphoresis, fatigue, fever and unexpected weight change.   HENT: Negative.  Negative for congestion, hearing loss, postnasal drip, rhinorrhea, sore throat, trouble swallowing and voice change.    Eyes: Negative.  Negative for visual disturbance.   Respiratory: Negative.  Negative for cough, choking, chest tightness and shortness of breath.    Cardiovascular:  Negative for chest pain, palpitations and leg swelling.   Gastrointestinal:  Negative for abdominal distention, abdominal pain, anorexia, blood in stool, change in bowel habit, constipation, diarrhea, nausea and vomiting.   Endocrine: Negative for cold intolerance, heat intolerance, polydipsia and polyuria.   Genitourinary: Negative.  Negative for difficulty urinating and frequency.   Musculoskeletal:  Positive for arthralgias, back pain, gait problem, myalgias, neck pain and neck stiffness. Negative for joint swelling.   Skin:  Negative for color change, pallor, rash and wound.   Neurological:  Positive for weakness, numbness and headaches. Negative for dizziness, vertigo, tremors and light-headedness.   Hematological:  Negative for adenopathy.   Psychiatric/Behavioral:   "Negative for behavioral problems, confusion, self-injury, sleep disturbance and suicidal ideas. The patient is not nervous/anxious.      Objective:   /70 (BP Location: Right arm, Patient Position: Sitting, BP Method: Large (Manual))   Pulse 92   Temp 97.6 °F (36.4 °C) (Tympanic)   Ht 5' 5" (1.651 m)   Wt 88 kg (194 lb 0.1 oz)   SpO2 98%   BMI 32.28 kg/m²     Physical Exam  Vitals reviewed.   Constitutional:       General: He is not in acute distress.     Appearance: Normal appearance. He is well-developed. He is not ill-appearing, toxic-appearing or diaphoretic.   HENT:      Head: Normocephalic and atraumatic.      Right Ear: External ear normal.      Left Ear: External ear normal.      Nose: Nose normal.   Eyes:      Conjunctiva/sclera: Conjunctivae normal.      Pupils: Pupils are equal, round, and reactive to light.   Cardiovascular:      Rate and Rhythm: Normal rate and regular rhythm.      Heart sounds: Normal heart sounds. No murmur heard.     No friction rub. No gallop.   Pulmonary:      Effort: Pulmonary effort is normal. No respiratory distress.      Breath sounds: Normal breath sounds. No wheezing or rales.   Chest:      Chest wall: No tenderness.   Abdominal:      General: There is no distension.      Palpations: Abdomen is soft.      Tenderness: There is no abdominal tenderness.   Musculoskeletal:         General: Normal range of motion.      Cervical back: Normal range of motion and neck supple.   Lymphadenopathy:      Cervical: No cervical adenopathy.   Skin:     General: Skin is warm and dry.      Capillary Refill: Capillary refill takes less than 2 seconds.      Findings: No rash.   Neurological:      Mental Status: He is alert and oriented to person, place, and time.      Motor: No weakness.      Coordination: Coordination normal.      Gait: Gait normal.   Psychiatric:         Mood and Affect: Mood normal.         Behavior: Behavior normal.         Thought Content: Thought content normal. "         Judgment: Judgment normal.         Assessment:     1. Chronic pain of both shoulders    2. New daily persistent headache    3. Other chronic pain    4. Cervicalgia      Plan:   Chronic pain of both shoulders  -     X-ray Shoulder 2 or More Views Right; Future; Expected date: 01/29/2024  -     Ambulatory referral/consult to Orthopedics; Future; Expected date: 02/05/2024  -     X-Ray Shoulder 2 or More Views Left; Future; Expected date: 01/29/2024  -     Ambulatory referral/consult to Pain Clinic; Future; Expected date: 02/05/2024    New daily persistent headache  -     Ambulatory referral/consult to Neurology; Future; Expected date: 02/05/2024  -     Sedimentation rate; Future; Expected date: 01/29/2024    Other chronic pain  -     Ambulatory referral/consult to Pain Clinic; Future; Expected date: 02/05/2024    Cervicalgia  -     Ambulatory referral/consult to Pain Clinic; Future; Expected date: 02/05/2024    -cont lidocaine patch, tylenol, alternating ice/heat  -needs follow up with ortho for shoulder pain and needs to get in with pain management. Pain not controlled.   -also see neuro for follow up on headaches.     Follow up if symptoms worsen or fail to improve.

## 2024-02-05 ENCOUNTER — TELEPHONE (OUTPATIENT)
Dept: SPORTS MEDICINE | Facility: CLINIC | Age: 69
End: 2024-02-05
Payer: MEDICARE

## 2024-02-12 NOTE — PROGRESS NOTES
Orthopaedic Follow-Up Visit    Last Appointment: 2/22/22  Diagnosis: S/P left Reverse Total Shoulder Arthroplasty, second post-operative visit - routine healing   Prior Procedure: None    Crow Green is a 68 y.o. male who is here for evaluation of his bilateral shoulders. The patient was last seen here by me on 2/22/22 s/p left reverse TSA (DOS: 1/10/22). The patient returns today reporting bilateral shoulder pain that has persisted since a fall on 1/16/24 after slipping on ice. He was seen in the ER of 1/18/24 and XR revealed nothing acute. Followed-up with PCP on 1/29/24 and noted no improvement with norco or lidocaine patches at home. No improvement with tylenol. Was referred to orthopedics for further evaluation.     To review his history, Crow Green is a 68 y.o. male who was previously seen s/p left reverse TSA (DOS: 1/10/22).     Patient's medications, allergies, past medical, surgical, social and family histories were reviewed and updated as appropriate.    Review of Systems   All systems reviewed were negative.  Specifically, the patient denies fever, chills, weight loss, chest pain, shortness of breath, or dyspnea on exertion.      Past Medical History:   Diagnosis Date    Arthritis     Asthma     Atrial fibrillation     Atrial fibrillation with RVR 08/07/2019    Carotid artery stenosis     CHF (congestive heart failure)     Chronic obstructive pulmonary disease 08/05/2020    Depression     Dermatomyositis     Diabetes mellitus, type 2 Diagnosed in 2000    Elevated PSA     Follow up 07/30/2020    Hypertension     Myocardial infarction     2017    Sleep apnea        Past Surgical History:   Procedure Laterality Date    ABLATION OF ARRHYTHMOGENIC FOCUS FOR ATRIAL FIBRILLATION N/A 2/27/2020    Procedure: Ablation atrial fibrillation;  Surgeon: Gio Brown MD;  Location: [facility] EP LAB;  Service: Cardiology;  Laterality: N/A;  afib, DAMIEN (cx if SR), PVI, PITO, anes, MB, 3 Prep     CHOLECYSTECTOMY      CLOSURE OF WOUND Left 7/1/2020    Procedure: CLOSURE, WOUND;  Surgeon: Barb Veras DPM;  Location: [facility] OR;  Service: Podiatry;  Laterality: Left;    COLONOSCOPY N/A 3/4/2022    Procedure: COLONOSCOPY;  Surgeon: Yolis Freitas MD;  Location: [facility] ENDO;  Service: Endoscopy;  Laterality: N/A;    ESOPHAGOGASTRODUODENOSCOPY N/A 3/4/2022    Procedure: EGD (ESOPHAGOGASTRODUODENOSCOPY);  Surgeon: Yolis Freitas MD;  Location: [facility];  Service: Endoscopy;  Laterality: N/A;    INJECTION OF ANESTHETIC AGENT INTO SACROILIAC JOINT Left 3/24/2021    Procedure: Left BLOCK, SACROILIAC JOINT and bilateral rhomboid TPI;  Surgeon: Dillan Tsai MD;  Location: [facility] PAIN MGT;  Service: Pain Management;  Laterality: Left;    INTRALUMINAL GASTROINTESTINAL TRACT IMAGING VIA CAPSULE N/A 3/22/2022    Procedure: IMAGING PROCEDURE, GI TRACT, INTRALUMINAL, VIA CAPSULE;  Surgeon: Justice Martinez RN;  Location: [facility];  Service: Endoscopy;  Laterality: N/A;    REVERSE TOTAL SHOULDER ARTHROPLASTY Left 1/10/2022    Procedure: ARTHROPLASTY, SHOULDER, TOTAL, REVERSE;  Surgeon: Anthony Alvarado MD;  Location: [facility] ER;  Service: Orthopedics;  Laterality: Left;  left reverse total shoulder arthroplasty     SELECTIVE INJECTION OF ANESTHETIC AGENT AROUND LUMBAR SPINAL NERVE ROOT BY TRANSFORAMINAL APPROACH Left 6/11/2020    Procedure: BLOCK, SPINAL NERVE ROOT, LUMBAR, SELECTIVE, TRANSFORAMINAL APPROACH Left L4-5, L5-S1 TFESI with RN IV sedation;  Surgeon: Dillan Tsai MD;  Location: [facility] PAIN MGT;  Service: Pain Management;  Laterality: Left;    TOE AMPUTATION Left 6/29/2020    Procedure: AMPUTATION, TOE;  Surgeon: Barb Veras DPM;  Location: [facility] OR;  Service: Podiatry;  Laterality: Left;  great toe       Patient's Medications   New Prescriptions    No medications on file   Previous Medications    ALBUTEROL (PROVENTIL) 2.5 MG /3 ML (0.083 %) NEBULIZER SOLUTION    INHALE THE CONTENTS OF 1 VIAL VIA  NEBULIZER EVERY 4 TO 6 HOURS AS NEEDED FOR WHEEZING OR FOR SHORTNESS OF BREATH    ALCOHOL SWABS (ALCOHOL PADS) PADM    Apply 1 each topically 4 (four) times daily.    ALLOPURINOL (ZYLOPRIM) 100 MG TABLET    Take 1 tablet (100 mg total) by mouth once daily.    AMLODIPINE (NORVASC) 2.5 MG TABLET    Take 1 tablet (2.5 mg total) by mouth once daily.    APIXABAN (ELIQUIS) 5 MG TAB    TAKE 1 TABLET TWICE DAILY    ATORVASTATIN (LIPITOR) 40 MG TABLET    Take 1 tablet (40 mg total) by mouth every evening.    BLOOD SUGAR DIAGNOSTIC STRP    1 each by Misc.(Non-Drug; Combo Route) route 4 (four) times daily.    BLOOD-GLUCOSE METER MISC    Use as instructed to test blood glucose meter daily.    BRINZOLAMIDE-BRIMONIDINE (SIMBRINZA) 1-0.2 % DRPS    Place 1 drop into both eyes 3 (three) times daily.    CYANOCOBALAMIN (VITAMIN B-12) 1000 MCG TABLET    Take 1 tablet by mouth once daily.    EMPAGLIFLOZIN (JARDIANCE) 25 MG TABLET    Take 1 tablet (25 mg total) by mouth once daily.    EPINEPHRINE (EPIPEN) 0.3 MG/0.3 ML ATIN    Inject 0.3 mLs (0.3 mg total) into the muscle once. for 1 dose    FLUTICASONE-UMECLIDIN-VILANTER (TRELEGY ELLIPTA) 200-62.5-25 MCG INHALER    Inhale 1 puff into the lungs once daily.    FUROSEMIDE (LASIX) 40 MG TABLET    Take 2 tablets (80 mg total) by mouth 2 (two) times a day.    GABAPENTIN (NEURONTIN) 800 MG TABLET    Take 1 tablet (800 mg total) by mouth 3 (three) times daily.    INSULIN ASPART U-100 (NOVOLOG FLEXPEN U-100 INSULIN) 100 UNIT/ML (3 ML) INPN PEN    INJECT 10 UNITS UNDER THE SKIN THREE TIMES DAILY WITH MEALS    INV ALLOPURINOL TABLET    Take 1 tablet by mouth once daily.    LANCETS (TRUEPLUS LANCETS) 33 GAUGE MISC    Use to check blood sugar twice a day    LANCETS MISC    1 each by Misc.(Non-Drug; Combo Route) route 4 (four) times daily. Insurance preferred    LATANOPROST 0.005 % OPHTHALMIC SOLUTION    INSTILL 1 DROP INTO BOTH EYES ONE TIME DAILY BOTTLE EXPIRES 42 DAYS AFTER OPENING...     "LISINOPRIL (PRINIVIL,ZESTRIL) 30 MG TABLET    TAKE 1 TABLET EVERY DAY.    LISINOPRIL 10 MG TABLET    TAKE 1 TABLET EVERY DAY    MULTIVITAMIN-IRON-FOLIC ACID (SENTRY) TAB    Take 1 tablet by mouth once daily.    NAPROXEN (NAPROSYN) 500 MG TABLET    Take 1 tablet (500 mg total) by mouth 2 (two) times daily with meals.    OMALIZUMAB (XOLAIR) 150 MG/ML INJECTION    Inject 2 mLs (300 mg total) into the skin every 14 (fourteen) days.    OMALIZUMAB (XOLAIR) 75 MG/0.5 ML INJECTION    Inject 0.5 mLs (75 mg total) into the skin every 14 (fourteen) days.    OMEPRAZOLE (PRILOSEC) 20 MG CAPSULE    Take 20 mg by mouth every morning.    PANTOPRAZOLE (PROTONIX) 40 MG TABLET    Take 1 tablet (40 mg total) by mouth once daily.    PAPAVERINE-PHENTOLAMIN-ALPROST 150 MG-5 MG- 50 MCG SOLR    0.5 mLs by Intracavernosal route as needed (ED).    PEN NEEDLE, DIABETIC (BD ULTRA-FINE SHORT PEN NEEDLE) 31 GAUGE X 5/16" NDLE    1 each by Misc.(Non-Drug; Combo Route) route 3 (three) times daily.    PEP INJECTION    Inject 0.15 ml as directed     For compounding pharmacy use:   Add PAPAVERINE 30 mg  Add PHENTOLAMINE 1 mg  Add ALPROSTADIL 20 mcg    PREDNISONE (DELTASONE) 50 MG TAB    Take 50 mg by mouth.    RANOLAZINE (RANEXA) 1,000 MG TB12    TAKE 1 TABLET TWICE DAILY    SEMAGLUTIDE (OZEMPIC) 2 MG/DOSE (8 MG/3 ML) PNIJ    Inject 2 mg into the skin every 7 days.    SERTRALINE (ZOLOFT) 100 MG TABLET    Take 1 tablet (100 mg total) by mouth every evening.    TAMSULOSIN (FLOMAX) 0.4 MG CAP    Take 1 capsule (0.4 mg total) by mouth once daily.    TRAMADOL (ULTRAM) 50 MG TABLET    Take 50 mg by mouth.    TRAZODONE (DESYREL) 100 MG TABLET    TAKE 2 TABLETS EVERY EVENING    VIOS AEROSOL DELIVERY SYSTEM VIN    USE AS DIRESTED   Modified Medications    No medications on file   Discontinued Medications    No medications on file       Family History   Problem Relation Age of Onset    Hypertension Mother     Glaucoma Mother     Cataracts Mother     Diabetes " Mother     Cataracts Father     Stroke Father     Glaucoma Sister         x3    Cataracts Sister         x3    Diabetes Sister         x1    Stroke Sister         x1    No Known Problems Brother     No Known Problems Daughter     No Known Problems Son     Glaucoma Maternal Aunt     Diabetes Maternal Aunt     Cancer Maternal Grandfather         lung (smoker)    Prostate cancer Neg Hx     Heart disease Neg Hx     Kidney disease Neg Hx        Review of patient's allergies indicates:   Allergen Reactions    Protamine Hives     Urticaria, possible upper airway swelling         Objective:      Physical Exam  Patient is alert and oriented, no distress. Skin is intact. Neuro is normal with no focal motor or sensory findings.    Cervical exam is unremarkable. Intact cervical ROM. Negative Spurling's test    Physical Exam:  RIGHT    LEFT    Scap Dyskinesis/Winging (-)    (-)    Tenderness:          Greater Tuberosity  (-)    (-)  Bicipital Groove  (-)    (-)  AC joint   (-)    (-)  Other:     ROM:  Forward Elevation 180***    180***  Abduction  120***    120***  ER (at side)  80***    80***  IR   T8***    T8***    Strength:   Supraspinatus  5/5    5/5  Infraspinatus  5/5    5/5  Subscap / IR  5/5    5/5     Special Tests:   Apprehension:   (-)    (-)   Jennifer Relocation:  (-)    (-)   Jerk / Posterior Load:  (-)    (-)   Neer:    (-)    (-)   Arevalo:   (-)    (-)   SS Stress:   (-)    (-)   Bear Hug:   (-)    (-)   Berlin's:   (-)    (-)   Resisted Thrower's:   (-)    (-)   Cross Arm Abduction:  (-)    (-)    Neurovascular examination  - Motor grossly intact bilaterally to shoulder abduction, elbow flexion and extension, wrist flexion and extension, and intrinsic hand musculature  - Sensation intact to light touch bilaterally in axillary, median, radial, and ulnar distributions  - Symmetrical radial pulses    Imaging:    XR Results:  Results for orders placed during the hospital encounter of 01/29/24    X-Ray Shoulder 2 or  More views Bilateral    Narrative  EXAMINATION:  XR SHOULDER COMPLETE 2 OR MORE VIEWS BILATERAL    CLINICAL HISTORY:  Pain in right shoulder    TECHNIQUE:  Four views were obtained of the bilateral shoulders.    COMPARISON:  03/29/2022    FINDINGS:  Postoperative findings from prior left shoulder arthroplasty are again noted with reversed prosthesis in place.  No findings to suggest hardware complication.  No acute fractures or dislocations visualized.  Mild AC joint arthrosis on the left is unchanged.  There is moderate glenohumeral osteoarthritis on the right as well as mild right-sided AC joint arthrosis.    Impression  Degenerative and postoperative findings as above      Electronically signed by: Jame Stephens MD  Date:    01/29/2024  Time:    12:25      MRI Results:  No results found for this or any previous visit.      CT Results:  No results found for this or any previous visit.      Physician read: I agree with the above impression.***    Assessment/Plan:   Crow Green is a 68 y.o. male with ***    Plan:    ***  Follow up ***          Anthony Alvarado MD    I, Femi Vann, acted as a scribe for Anthony Alvarado MD for the duration of this office visit.

## 2024-02-14 ENCOUNTER — OFFICE VISIT (OUTPATIENT)
Dept: SPORTS MEDICINE | Facility: CLINIC | Age: 69
End: 2024-02-14
Payer: MEDICARE

## 2024-02-14 ENCOUNTER — CLINICAL SUPPORT (OUTPATIENT)
Dept: DIABETES | Facility: CLINIC | Age: 69
End: 2024-02-14
Payer: MEDICARE

## 2024-02-14 ENCOUNTER — OFFICE VISIT (OUTPATIENT)
Dept: PULMONOLOGY | Facility: CLINIC | Age: 69
End: 2024-02-14
Payer: MEDICARE

## 2024-02-14 ENCOUNTER — TELEPHONE (OUTPATIENT)
Dept: DIABETES | Facility: CLINIC | Age: 69
End: 2024-02-14

## 2024-02-14 ENCOUNTER — OFFICE VISIT (OUTPATIENT)
Dept: CARDIOLOGY | Facility: CLINIC | Age: 69
End: 2024-02-14
Payer: MEDICARE

## 2024-02-14 VITALS
RESPIRATION RATE: 18 BRPM | HEART RATE: 86 BPM | OXYGEN SATURATION: 94 % | WEIGHT: 193.31 LBS | DIASTOLIC BLOOD PRESSURE: 80 MMHG | SYSTOLIC BLOOD PRESSURE: 132 MMHG | BODY MASS INDEX: 32.21 KG/M2 | HEIGHT: 65 IN

## 2024-02-14 VITALS — RESPIRATION RATE: 17 BRPM | BODY MASS INDEX: 32.03 KG/M2 | WEIGHT: 192.25 LBS | HEIGHT: 65 IN

## 2024-02-14 VITALS
HEIGHT: 65 IN | SYSTOLIC BLOOD PRESSURE: 90 MMHG | HEART RATE: 61 BPM | WEIGHT: 193.56 LBS | OXYGEN SATURATION: 93 % | BODY MASS INDEX: 32.25 KG/M2 | DIASTOLIC BLOOD PRESSURE: 66 MMHG

## 2024-02-14 DIAGNOSIS — I70.0 ATHEROSCLEROSIS OF AORTA: ICD-10-CM

## 2024-02-14 DIAGNOSIS — J44.9 CHRONIC OBSTRUCTIVE PULMONARY DISEASE, UNSPECIFIED COPD TYPE: ICD-10-CM

## 2024-02-14 DIAGNOSIS — G89.29 CHRONIC PAIN OF BOTH SHOULDERS: ICD-10-CM

## 2024-02-14 DIAGNOSIS — M25.511 CHRONIC PAIN OF BOTH SHOULDERS: ICD-10-CM

## 2024-02-14 DIAGNOSIS — I50.43 ACUTE ON CHRONIC COMBINED SYSTOLIC AND DIASTOLIC HEART FAILURE: ICD-10-CM

## 2024-02-14 DIAGNOSIS — I25.110 ATHEROSCLEROSIS OF NATIVE CORONARY ARTERY OF NATIVE HEART WITH UNSTABLE ANGINA PECTORIS: ICD-10-CM

## 2024-02-14 DIAGNOSIS — I25.118 CORONARY ARTERY DISEASE OF NATIVE ARTERY OF NATIVE HEART WITH STABLE ANGINA PECTORIS: ICD-10-CM

## 2024-02-14 DIAGNOSIS — E78.5 HYPERLIPIDEMIA ASSOCIATED WITH TYPE 2 DIABETES MELLITUS: ICD-10-CM

## 2024-02-14 DIAGNOSIS — K21.9 GASTROESOPHAGEAL REFLUX DISEASE WITHOUT ESOPHAGITIS: ICD-10-CM

## 2024-02-14 DIAGNOSIS — E11.65 UNCONTROLLED TYPE 2 DIABETES MELLITUS WITH HYPERGLYCEMIA: ICD-10-CM

## 2024-02-14 DIAGNOSIS — M12.811 ROTATOR CUFF TEAR ARTHROPATHY OF RIGHT SHOULDER: ICD-10-CM

## 2024-02-14 DIAGNOSIS — E11.65 UNCONTROLLED TYPE 2 DIABETES MELLITUS WITH HYPERGLYCEMIA: Primary | ICD-10-CM

## 2024-02-14 DIAGNOSIS — Z91.89 AT RISK FOR MEDICATION NONCOMPLIANCE: ICD-10-CM

## 2024-02-14 DIAGNOSIS — M25.512 CHRONIC PAIN OF BOTH SHOULDERS: ICD-10-CM

## 2024-02-14 DIAGNOSIS — J45.40 MODERATE PERSISTENT EXTRINSIC ASTHMA WITHOUT COMPLICATION: Primary | ICD-10-CM

## 2024-02-14 DIAGNOSIS — L97.523 DIABETIC ULCER OF TOE OF LEFT FOOT ASSOCIATED WITH TYPE 2 DIABETES MELLITUS, WITH NECROSIS OF MUSCLE: ICD-10-CM

## 2024-02-14 DIAGNOSIS — L97.528 ULCER OF LEFT FOOT WITH OTHER SEVERITY: ICD-10-CM

## 2024-02-14 DIAGNOSIS — E66.9 OBESITY (BMI 30.0-34.9): ICD-10-CM

## 2024-02-14 DIAGNOSIS — I50.42 CHRONIC COMBINED SYSTOLIC AND DIASTOLIC HEART FAILURE: ICD-10-CM

## 2024-02-14 DIAGNOSIS — D84.9 IMMUNOCOMPROMISED: ICD-10-CM

## 2024-02-14 DIAGNOSIS — I65.23 BILATERAL CAROTID ARTERY STENOSIS: Primary | ICD-10-CM

## 2024-02-14 DIAGNOSIS — M75.101 ROTATOR CUFF TEAR ARTHROPATHY OF RIGHT SHOULDER: ICD-10-CM

## 2024-02-14 DIAGNOSIS — F19.10 SUBSTANCE ABUSE: ICD-10-CM

## 2024-02-14 DIAGNOSIS — G47.33 OSA (OBSTRUCTIVE SLEEP APNEA): ICD-10-CM

## 2024-02-14 DIAGNOSIS — Z96.612 S/P REVERSE TOTAL SHOULDER ARTHROPLASTY, LEFT: ICD-10-CM

## 2024-02-14 DIAGNOSIS — I48.0 PAF (PAROXYSMAL ATRIAL FIBRILLATION): ICD-10-CM

## 2024-02-14 DIAGNOSIS — R93.1 ELEVATED CORONARY ARTERY CALCIUM SCORE: ICD-10-CM

## 2024-02-14 DIAGNOSIS — I42.8 NICM (NONISCHEMIC CARDIOMYOPATHY): ICD-10-CM

## 2024-02-14 DIAGNOSIS — E11.69 HYPERLIPIDEMIA ASSOCIATED WITH TYPE 2 DIABETES MELLITUS: ICD-10-CM

## 2024-02-14 DIAGNOSIS — E11.621 DIABETIC ULCER OF TOE OF LEFT FOOT ASSOCIATED WITH TYPE 2 DIABETES MELLITUS, WITH NECROSIS OF MUSCLE: ICD-10-CM

## 2024-02-14 DIAGNOSIS — E11.42 DIABETIC POLYNEUROPATHY ASSOCIATED WITH TYPE 2 DIABETES MELLITUS: ICD-10-CM

## 2024-02-14 DIAGNOSIS — I10 ESSENTIAL HYPERTENSION: ICD-10-CM

## 2024-02-14 DIAGNOSIS — M19.011 ARTHRITIS OF RIGHT GLENOHUMERAL JOINT: Primary | ICD-10-CM

## 2024-02-14 PROCEDURE — 3008F BODY MASS INDEX DOCD: CPT | Mod: CPTII,S$GLB,, | Performed by: STUDENT IN AN ORGANIZED HEALTH CARE EDUCATION/TRAINING PROGRAM

## 2024-02-14 PROCEDURE — 1100F PTFALLS ASSESS-DOCD GE2>/YR: CPT | Mod: CPTII,S$GLB,, | Performed by: INTERNAL MEDICINE

## 2024-02-14 PROCEDURE — 99999 PR PBB SHADOW E&M-EST. PATIENT-LVL V: CPT | Mod: PBBFAC,,, | Performed by: INTERNAL MEDICINE

## 2024-02-14 PROCEDURE — 1101F PT FALLS ASSESS-DOCD LE1/YR: CPT | Mod: CPTII,S$GLB,, | Performed by: INTERNAL MEDICINE

## 2024-02-14 PROCEDURE — 1125F AMNT PAIN NOTED PAIN PRSNT: CPT | Mod: CPTII,S$GLB,, | Performed by: INTERNAL MEDICINE

## 2024-02-14 PROCEDURE — 3074F SYST BP LT 130 MM HG: CPT | Mod: CPTII,S$GLB,, | Performed by: INTERNAL MEDICINE

## 2024-02-14 PROCEDURE — 3008F BODY MASS INDEX DOCD: CPT | Mod: CPTII,S$GLB,, | Performed by: INTERNAL MEDICINE

## 2024-02-14 PROCEDURE — 1159F MED LIST DOCD IN RCRD: CPT | Mod: CPTII,S$GLB,, | Performed by: INTERNAL MEDICINE

## 2024-02-14 PROCEDURE — 99214 OFFICE O/P EST MOD 30 MIN: CPT | Mod: S$GLB,,, | Performed by: INTERNAL MEDICINE

## 2024-02-14 PROCEDURE — 99999 PR PBB SHADOW E&M-EST. PATIENT-LVL III: CPT | Mod: PBBFAC,,, | Performed by: DIETITIAN, REGISTERED

## 2024-02-14 PROCEDURE — 3288F FALL RISK ASSESSMENT DOCD: CPT | Mod: CPTII,S$GLB,, | Performed by: STUDENT IN AN ORGANIZED HEALTH CARE EDUCATION/TRAINING PROGRAM

## 2024-02-14 PROCEDURE — 99999 PR PBB SHADOW E&M-EST. PATIENT-LVL V: CPT | Mod: PBBFAC,,, | Performed by: STUDENT IN AN ORGANIZED HEALTH CARE EDUCATION/TRAINING PROGRAM

## 2024-02-14 PROCEDURE — 3288F FALL RISK ASSESSMENT DOCD: CPT | Mod: CPTII,S$GLB,, | Performed by: INTERNAL MEDICINE

## 2024-02-14 PROCEDURE — 3078F DIAST BP <80 MM HG: CPT | Mod: CPTII,S$GLB,, | Performed by: INTERNAL MEDICINE

## 2024-02-14 PROCEDURE — 1101F PT FALLS ASSESS-DOCD LE1/YR: CPT | Mod: CPTII,S$GLB,, | Performed by: STUDENT IN AN ORGANIZED HEALTH CARE EDUCATION/TRAINING PROGRAM

## 2024-02-14 PROCEDURE — 1159F MED LIST DOCD IN RCRD: CPT | Mod: CPTII,S$GLB,, | Performed by: STUDENT IN AN ORGANIZED HEALTH CARE EDUCATION/TRAINING PROGRAM

## 2024-02-14 PROCEDURE — 1125F AMNT PAIN NOTED PAIN PRSNT: CPT | Mod: CPTII,S$GLB,, | Performed by: STUDENT IN AN ORGANIZED HEALTH CARE EDUCATION/TRAINING PROGRAM

## 2024-02-14 PROCEDURE — G0108 DIAB MANAGE TRN  PER INDIV: HCPCS | Mod: S$GLB,,, | Performed by: DIETITIAN, REGISTERED

## 2024-02-14 PROCEDURE — 1160F RVW MEDS BY RX/DR IN RCRD: CPT | Mod: CPTII,S$GLB,, | Performed by: STUDENT IN AN ORGANIZED HEALTH CARE EDUCATION/TRAINING PROGRAM

## 2024-02-14 PROCEDURE — 99214 OFFICE O/P EST MOD 30 MIN: CPT | Mod: S$GLB,,, | Performed by: STUDENT IN AN ORGANIZED HEALTH CARE EDUCATION/TRAINING PROGRAM

## 2024-02-14 PROCEDURE — 3075F SYST BP GE 130 - 139MM HG: CPT | Mod: CPTII,S$GLB,, | Performed by: INTERNAL MEDICINE

## 2024-02-14 PROCEDURE — 1160F RVW MEDS BY RX/DR IN RCRD: CPT | Mod: CPTII,S$GLB,, | Performed by: INTERNAL MEDICINE

## 2024-02-14 PROCEDURE — 99213 OFFICE O/P EST LOW 20 MIN: CPT | Mod: S$GLB,,, | Performed by: INTERNAL MEDICINE

## 2024-02-14 PROCEDURE — 3079F DIAST BP 80-89 MM HG: CPT | Mod: CPTII,S$GLB,, | Performed by: INTERNAL MEDICINE

## 2024-02-14 RX ORDER — CALCIUM CITRATE/VITAMIN D3 200MG-6.25
TABLET ORAL
Qty: 100 STRIP | Refills: 3 | Status: SHIPPED | OUTPATIENT
Start: 2024-02-14 | End: 2024-02-14 | Stop reason: SDUPTHER

## 2024-02-14 NOTE — PATIENT INSTRUCTIONS
In preparation for you upcoming surgery, here are some things to keep in mind leading up to and after your surgery:    PRE-ADMIT APPOINTMENT  We have a department that will review your chart for any health conditions or other issues to make sure that it is safe from an anesthesia standpoint to undergo surgery.   If they have any concerns they may schedule an appointment for you to be evaluated and have any further testing done. This may include but is not limited to bloodwork, EKG, chest X-ray, referral to cardiologist for additional testing/clearance, referral to pulmonologist for additional testing/clearance, or referral to any other needed specialties for additional testing/clearance.  If only basic testing is needed this appointment may be scheduled a few days before your actually surgery. Unless any new concerns or issues arise, this typically does not affect the date of your surgery.   If you are on any medications, at this appointment they will also review and discuss/provide instructions on when to stop or start taking these medications before and after surgery.   INSTRUCTIONS FOR SURGERY   The day before your surgery (usually between 1-3 PM), someone will call you to give you the scheduled time for you surgery, what time you need to arrive, what time to not eat/drink past, and any other final instructions  If your surgery is scheduled for Monday then they will call you on Friday afternoon.   If you do not receive this call please reach out to our office before the end of the day (4 PM) so that we may assist you.   HOME EXERCISES AFTER SURGERY        PHYSICAL THERAPY  A referral for physical therapy will be placed to the location we discussed today. If you would like to make changes to this, please give us a call or send us a IPX message as soon as possible so we may coordinate these changes.   We will send that referral to the desired location and also provide them with the rehabilitation protocol that  will be followed to make sure you are progressed appropriately after surgery.   They will also be provided with the start date of your PT after surgery. You will likely start PT prior to you first post-op appointment. Depending on the procedure you have performed this may be as soon as 3 days after surgery or up to 2 weeks after surgery. Below are a few examples of some common time frames for certain procedures:  Rotator cuff surgery- 10-11 days after surgery  Shoulder labrum surgery- 3-5 days after surgery   Shoulder replacement- 3-5 days after surgery  ACL Reconstruction- 3-5 days after surgery  Hip scope- 3-5 days after surgery  Distal biceps tendon repair- once post-op splint is removed/per Dr. Alvarado recommendation  Fracture- once post-op splint is removed/per Dr. Alvarado recommendation   If you ever have any problems or issues with your physical therapy or wish to change locations at any point, please let us know and we are happy to assist with that change.   POST-OP APPOINTMENTS  Post-op appointments will be scheduled at 2 weeks, 6 weeks, and 3 months from the date of your surgery. We will schedule these appointments prior to your surgery and they will be able to be viewed in [employer].  2 week post-op appointment  This appointment will be with Beba Desouza who is Dr. Alvarado's physician assistant (PA). At this appointment we will be removing your sutures, checking for any signs of infection or other concerning issues, and checking to make sure your range of motion is appropriate.   Dr. Alvarado will also be in clinic on the same day as this appointment and can step in to see you if there is anything of concern that needs to be addressed.   You may also have X-rays scheduled at this appointment, depending on the procedure you had performed (shoulder replacement, ACL surgery, surgery for a fracture, etc.)  6 week post-op appointment  This appointment will be with Dr. Alvarado. He will make sure  everything is progressing well and may also review your pictures from surgery if any were taken.   You may also have X-rays at this appointment, depending on the procedure you had performed (shoulder replacement, surgery for a fracture, etc.)  3 month post-op appointment  This appointment will be with Dr. Alvarado. He will make sure you continue to progress appropriately.  Any follow-ups after this visit will be at the discretion of Dr. Alvarado based upon your recovery/progress, procedure performed, etc.   FMLA OR SHORT TERM DISABILITY PAPERWORK  If you have any paperwork that needs to be filled out in regards to FMLA leave or short term disability leave, you may drop these forms off at the [place] or attachment them to a patient message via [employer] [place].   These forms will be filled out within a few days of your actual surgery being performed in case your surgery date is rescheduled or changed and the forms would need to potentially be filled out again.   Please try to provide these forms to our office in a timely manner, as we ask for 5-7 business days for them to be completed.         REVERSE TOTAL SHOULDER REPLACMENT    This information does not include all information related to shoulder instablity. For more information please visit the [employer] website using the following link: Reverse Total Shoulder Replacement    Every year, thousands of conventional total shoulder replacements are successfully done in the [place] for patients with shoulder arthritis.    However, this type of procedure is not as beneficial for patients with large rotator cuff tears who have developed a complex type of shoulder arthritis called cuff tear arthropathy. For these patients, conventional total shoulder replacement may result in pain and limited motion, and reverse total shoulder replacement is a better option.    Description  A conventional shoulder replacement device mimics the normal anatomy of the  shoulder: A plastic cup is fitted into the shoulder socket (glenoid), and a metal ball is attached to the top of the upper arm bone (humerus).    In a reverse total shoulder replacement, the socket and metal ball are switched. The metal ball is fixed to the socket, and the plastic cup is fixed to the upper end of the humerus. A reverse total shoulder replacement works better for people with cuff tear arthropathy because it relies on different muscles to move the arm.    In a healthy shoulder, the rotator cuff muscles help position and power the arm during range of motion. A conventional replacement device also uses the rotator cuff muscles to function properly.In a patient with a large rotator cuff tear and cuff tear arthropathy, these muscles no longer function. The reverse total shoulder replacement relies on the deltoid muscle, instead of the rotator cuff, to power and position the arm. It essentially re-creates the function of the torn rotator cuff.    This surgery was originally designed in the 1980s in [place]. The Food and Drug Administration (FDA) approved its use in the [place] in 2003.        Candidates for Surgery  Reverse total shoulder replacement may be recommended if you have:  A completely torn rotator cuff that cannot be repaired  Cuff tear arthropathy  A previous shoulder replacement that was unsuccessful  Severe shoulder pain and difficulty lifting your arm away from your side or over your head  A complex fracture of the shoulder joint  A chronic shoulder dislocation  A tumor of the shoulder joint    Your doctor may also recommend surgery if nonsurgical treatments, such as rest, medications, cortisone injections, and/or physical therapy, have not relieved your shoulder pain    Preparing for Surgery  Your orthopaedic surgeon will help you plan and prepare for your shoulder surgery.    Medical Evaluation  Most patients must have a complete physical by their primary care doctor or internist before  surgery. This is needed to make sure you are healthy enough to have the surgery and complete the recovery.    Many patients with chronic medical conditions, like heart disease, must also be evaluated by a specialist, such a cardiologist, before the surgery.    Medications  Be sure to talk to your orthopaedic surgeon about the medications you take. Some medications may need to be stopped before surgery. For example, the following over-the-counter medicines may cause excessive bleeding and should be stopped 2 weeks before surgery:    Non-steroidal anti-inflammatory medications, such as aspirin, ibuprofen, and naproxen  Some arthritis medications    If you take blood thinners, either your primary care doctor or cardiologist will advise you about stopping these medications before surgery. Additionally, certain types of rheumatoid arthritis medication may need to be stopped for a period of time.     Home Planning  Making simple changes in your home before surgery can make your recovery period easier.For the first several weeks after your surgery, it will be hard to reach high shelves and cupboards. Before your surgery, be sure to go through your home and place any items you may need afterwards on low shelves. When you come home from the hospital, you will need help for a few weeks with some daily tasks like dressing, bathing, cooking, and laundry. If you will not have any support at home immediately after surgery, you may need a short stay in a rehabilitation facility until you become more independent.    Learn more: Preparing for Joint Replacement Surgery    Your Surgery    Before Your Operation  Wear loose-fitting clothes and a button-front shirt when you go to the hospital for your surgery. After surgery, you will be wearing a sling and will have limited use of your arm.    Nerve Block  Will receive a nerve block for your surgery to help control your pain after surgery. This cab cause your arm (down to your hand) to  feel numb for up to 3 days after surgery. However, it may wear off sooner.      Surgical Procedure  The procedure to replace your shoulder joint with an artificial device usually takes about 2-2.5 hours. After surgery, you will be moved to the recovery room, where you will remain  while your recovery from anesthesia is monitored. Barring any complications you will go home the day of your surgery.   A video of what reverse total shoulder replacement looks like can be found at the following link: Reverse Total Shoulder Replacement Video        After Your Surgery    Immobilization  When you leave the hospital, your arm will be in a sling. You will need the sling to support and protect your shoulder for the first 2 to 6 weeks after surgery, depending on the complexity of your surgery and your surgeon's preference.    Dressing and Wound Care  Keep the dressing clean and dry. It is normal for there to be some drainage after surgery since the shoulder was irrigated with large amounts of fluid. Reinforce with additional gauze as necessary.  Remove the dressing the 7th day after surgery and begin changing daily with clean gauze or Band-Aids®. Keep your incisions covered until you follow up in clinic.  If you have Steri-Strips in place of stitches, allow them to stay in place as long as possible. Steri-Strips are made of a fabric material that can get wet in the shower and pat dry with a towel. They usually fall off on their own within 7 to 10 days. You may trim the edges as they begin to curl.    You may bathe or shower on the 7th day after surgery, but do not scrub or soak the incisions. Dry the area by gently blotting it with a gauze or towel. After it is completely dry, cover the wound with clean gauze or Band-Aids®. Do NOT submerge the incisions (bath/swim) until after the sutures are removed and the wound has completely healed.     Post-Op Medications    Pain Control  After surgery, you will feel pain. However, it is  important to stay ahead of pain as it becomes challenging to get under control if you fall behind. Ice and elevation can help and should be used as much as possible in the first few days.   Narcotic pain medications, such as tramadol and oxycodone, should be taken as prescribed. The Tramadol is intended to be taken first as the primary medicine and then oxycodone taken for breakthrough pain. Wean off as soon as possible. Take these with food to decrease the chances of nausea and vomiting. Do not drink alcohol, drive a vehicle, or use heavy machinery while taking narcotic pain medications.   NSAID medications are used for pain control and to decrease inflammation. You may be prescribed an NSAID such as celebrex. Take as instructed. Other NSAID medications such as ibuprofen, Motrin, Advil, naproxen, or Aleve can be used once you have finished the celebrex, or if a prescription for celebrex was not provided.   Acetaminophen (Tylenol) is an effective over-the-counter pain medication that can be used with NSAID medications and non-acetaminophen containing narcotics such as plain oxycodone.     Blood Clot Prevention  You should take one 81 mg baby aspirin twice daily for two weeks starting the evening of the day you have surgery unless instructed otherwise or taking a different blood thinner such as enoxaparin or warfarin. If you are aware that you are at high risk for a blood clot, notify your physician as soon as possible.   Take aspirin at least 30 minutes before taking ibuprofen or Toradol.    Constipation Prevention  Anesthesia and pain medications, changes in eating and drinking, and less activity can all lead to constipation after surgery. To prevent or reduce constipation, take an over-the-counter stool softener (brands include Colace and Miralax). Follow the directions on the bottle. Drink plenty of water and eat high fiber foods including whole grains, fresh fruits, vegetables, beans, prunes or prune  juice.    Physical Therapy  Exercise is a critical component of home care, particularly during the first few weeks after surgery and this will include physical therapy, which will play a vital role in getting you back to your daily activities by helping you regain shoulder strength and motion. Physical therapy will start 3-4 days after your surgery (it can start before your first post-op visit with your doctor). It will progress as follows  Passive exercise: Even though your tear has been repaired, the muscles around your arm remain weak. Once your surgeon decides it is safe for you to move your arm and shoulder, a therapist will help you with passive exercises to improve range of motion in your shoulder. With passive exercise, your therapist supports your arm and moves it in different positions. In most cases, passive exercise is begun within the first 4 to 6 weeks after surgery.  Active exercise: After 6 weeks, you will progress to doing active exercises without the help of your therapist. Moving your muscles on your own will gradually increase your strength and improve your arm control. At 8 to 12 weeks, your therapist will start you on a strengthening exercise program.    Driving  Your physician will provide recommendations on when it is safe to drive after surgery. At minimum you must NOT be taking any narcotic/opoid medications and feel you are capable of driving. It is NOT recommended that you drive while wearing a sling.     Expectations  Your surgeon will address what appropriate expectations following the surgery will be. You can expect to have functional range of motion which includes the ability to reach the back of your head to be able to wash or scratch it and to be able to pull up your pants. You WILL NOT be able to reach and scratch or wash your back following shoulder replacement surgery.     Reverse total shoulder replacement provides outstanding pain relief and patient satisfaction is typically  very high. Early and mid-term studies of the results of this surgery have been very promising.    Complications  Your orthopaedic surgeon will explain the potential risks and complications of shoulder joint replacement, including those related to the surgery itself and those that can occur over time after your surgery.When complications occur, most are successfully treatable. Possible complications include the following.    Infection: Infection is a complication of any surgery. In shoulder joint replacement, infection may occur in the wound or deep around the prosthesis. It may happen while in the hospital or after you go home. It may even occur years later. Minor infections in the wound area are generally treated with antibiotics. Major or deep infections may require more surgery and removal of the prosthesis. Any infection in your body can spread to your joint replacement.  Prosthesis Problems: Although prosthesis designs and materials, as well as surgical techniques, continue to advance, the prosthesis may wear down and the components may loosen. The components of a shoulder replacement may also dislocate. Excessive wear, loosening, or dislocation may require additional surgery (revision procedure).  Nerve damage: Nerves in the vicinity of the joint replacement may be damaged during surgery, although this type of injury is infrequent. Over time, these nerve injuries often improve and may completely recover.    Do's and Don'ts  The success of your surgery will depend largely on how well you follow your orthopaedic surgeon's instructions at home during the first few weeks after surgery. Here are some common do's and don'ts for when you return home:    DON'T use the arm to push yourself up in bed or from a chair because this requires forceful contraction of muscles.  Do follow the program of exercises prescribed for you by your surgeon and/or physical therapist.. You may need to do the exercises 2 to 3 times a day  for a month or more.  DON'T overdo it! If your shoulder pain was severe before the surgery, the experience of pain-free motion may lull you into thinking that you can do more than is prescribed. Early overuse of the shoulder may result in severe limitations in motion.  DON'T lift anything heavier than a glass of water for the first 2 to 4 weeks after surgery.  Do ask for assistance. Your physician may be able to recommend an agency or facility to help if you do not have home support.  DON'T participate in contact sports or do any repetitive heavy lifting after your shoulder replacement.  Do avoid placing your arm in any extreme position, such as straight out to the side or behind your body for the first 6 weeks after surgery.    Many thousands of patients have experienced an improved quality of life after shoulder joint replacement surgery. They experience less pain, improved motion and strength, and better function.    Links      Reverse Total Shoulder Replacement  Preparing for Joint Replacement Surgery  Reverse Total Shoulder Replacement Video  [employer] Clinical Practice Guideline on the Management of Glenohumeral Joint Arthritis

## 2024-02-14 NOTE — PROGRESS NOTES
Subjective:   Patient ID:  Crow Green is a 68 y.o. male who presents for evaluation of No chief complaint on file.      HPI  69 yo AAM, came in for 3 M routine f/u  PMH CAD nonobstructive and small vessel Dz per LHC done in 11/16, PAF s/p PVI in  by Dr. Brown, CHFpEF 45%, DM, hTN, HLD and obesity. Asthma and SANDRITA on CPAP f/u with Dr. Alicea.     -11/2016, Pt was admitted to [facility] for cough with little sputum for 6 weeks. Had severe left chest pain only with cough and sitting up from the bed. No pain with exercise. Denied palpitation, dizziness, orthopnea, PND and syncope. Troponin up to 0.218 and trending down. Echo showed EF 45% and MPI showed anteroseptal positive.   Subsequent LHC showed d2 30% lesion and otherwise demi Dz.   -03/2017, he was admitted to [facility] for acute cholelithiasis, s/p lap aidan. Pt had PAF during the admisssion and has been on amiodarone and Eliquis. BNP was up to 144 from 44 done five months ago.  -05/2018 at [facility], MPI showed SPECT images at rest and stress studies showed a moderate-sized, moderate anteroseptal perfusion defect and a large, severe inferior wall perfusion defect that are fixed. The gated stress study demonstrated a left ventricular ejection fraction of 35%, and ECHo showed EF 50%. Chest CTA showed no PE.  -2018, had urine drug presumptive cocaine positive but pt declined to use cocaine.   08/2019 afib with rvr during PFT, and admitted to [facility] due to weakness and dizziness.TROPONIN 0.07 AND FLAT. Converted to sinus after cardizem and BB. F/u with Dr. Brown on 08/15 and advised PVI if recurrent Afib.   echo showed EF 45%.  S/p Afib ablation in  by Dr. Brown  Lives with his dad      visit  05 and  twice admitted for chest pain. EKG no acute stt change. troponin 0.199 and flat.    ECHO EF 40%, MPI no ischemia  C/o cp 3 times a week. Lasted for seconds at rest. With SOB, dizziness.   Pt had quick chest pain for seconds when  he was sitting in the office today.      08/2021 visit  Admitted again in  for chest pain  Drug screen cocaine positive. Pt states that go ing to quit cocaine business  No recurrenbt chest pain       viit  Headache in the morning with dizziness., No syncope. No headache in PM.   NO recurrent rx. BP low. Visiting nurse helped to prepare the medications for 2 weeks and pt is medication compliant      visit  On scooter for few months due to hip pain and legs weakness  Breathing rx per pulm service.  No leg swelling, chest pain. Dizziness in AM. Med compliance      visit  Left shoulder pain improved after shoulder procedure and rehab  imbalance after the toe removal.   No chest angina pain. No NTG SL prn since nov 2021  SOB controlled by inhaler and breathing rx  No leg swelling. No smoking med compliance  EKG NSR and old anterior infarct     07-22 visit  Progressively more frequent angina, needs twice taking NTG SL. Occurred at rest.  No dyspnea palpitation,   Some dizziness and headachce. LDL 88 and A1c 6.4   echo EF 45% and mod LVH     07/23 visit  Run out of lasix for two weeks. Taking Lisinopril and forgot why not on entresto  BNP was 97 in 05/23 and was at 200 to 300 levels in 2022.   Had headache and blurred vision. Some sob.  Soul and back pain      Interval history  Sent to ER after last visit.few ER visits at [facility] ER. Ct showed no aorta aneurysm in chest and abd. Cocaine +  05/23 echo showed EF 50% at [facility]   Remains intermittent chest pain. Stable SOB. No palpitation faint   F/u at pain clinic and now cocaine clean   in 07/23 2/14/2024    Today he is referred for carotid disease.  He is complaining of bilateral neck shoulder arm pain has numbness in leg and feet and hands.  Has calf claudication when he walks long distance. Denies chest pain. No shortness of breath. He has no tia .   He is on the sedentary side.   He feels bad he did not eat or drink this  morning.      Past Surgical History:   Procedure Laterality Date    ABLATION OF ARRHYTHMOGENIC FOCUS FOR ATRIAL FIBRILLATION N/A 2/27/2020    Procedure: Ablation atrial fibrillation;  Surgeon: Gio Brown MD;  Location: [facility] EP LAB;  Service: Cardiology;  Laterality: N/A;  afib, DAMIEN (cx if SR), PVI, PITO, anes, MB, 3 Prep    CHOLECYSTECTOMY      CLOSURE OF WOUND Left 7/1/2020    Procedure: CLOSURE, WOUND;  Surgeon: Barb Veras DPM;  Location: [facility] OR;  Service: Podiatry;  Laterality: Left;    COLONOSCOPY N/A 3/4/2022    Procedure: COLONOSCOPY;  Surgeon: Yolis Freitas MD;  Location: [facility] ENDO;  Service: Endoscopy;  Laterality: N/A;    ESOPHAGOGASTRODUODENOSCOPY N/A 3/4/2022    Procedure: EGD (ESOPHAGOGASTRODUODENOSCOPY);  Surgeon: Yolis Freitas MD;  Location: [facility] ENDO;  Service: Endoscopy;  Laterality: N/A;    INJECTION OF ANESTHETIC AGENT INTO SACROILIAC JOINT Left 3/24/2021    Procedure: Left BLOCK, SACROILIAC JOINT and bilateral rhomboid TPI;  Surgeon: Dillan Tsai MD;  Location: [facility] PAIN MGT;  Service: Pain Management;  Laterality: Left;    INTRALUMINAL GASTROINTESTINAL TRACT IMAGING VIA CAPSULE N/A 3/22/2022    Procedure: IMAGING PROCEDURE, GI TRACT, INTRALUMINAL, VIA CAPSULE;  Surgeon: Justice Martinez RN;  Location: [facility] ENDO;  Service: Endoscopy;  Laterality: N/A;    REVERSE TOTAL SHOULDER ARTHROPLASTY Left 1/10/2022    Procedure: ARTHROPLASTY, SHOULDER, TOTAL, REVERSE;  Surgeon: Anthony Alvarado MD;  Location: [facility];  Service: Orthopedics;  Laterality: Left;  left reverse total shoulder arthroplasty     SELECTIVE INJECTION OF ANESTHETIC AGENT AROUND LUMBAR SPINAL NERVE ROOT BY TRANSFORAMINAL APPROACH Left 6/11/2020    Procedure: BLOCK, SPINAL NERVE ROOT, LUMBAR, SELECTIVE, TRANSFORAMINAL APPROACH Left L4-5, L5-S1 TFESI with RN IV sedation;  Surgeon: Dillan Tsai MD;  Location: [facility] PAIN MGT;  Service: Pain Management;  Laterality: Left;    TOE AMPUTATION Left 6/29/2020     Procedure: AMPUTATION, TOE;  Surgeon: Barb Veras DPM;  Location: [facility] OR;  Service: Podiatry;  Laterality: Left;  great toe       Social History     Tobacco Use    Smoking status: Never    Smokeless tobacco: Never   Substance Use Topics    Alcohol use: Yes     Comment: rare, maybe holidays    Drug use: Not Currently     Types: Cocaine       Family History   Problem Relation Age of Onset    Hypertension Mother     Glaucoma Mother     Cataracts Mother     Diabetes Mother     Cataracts Father     Stroke Father     Glaucoma Sister         x3    Cataracts Sister         x3    Diabetes Sister         x1    Stroke Sister         x1    No Known Problems Brother     No Known Problems Daughter     No Known Problems Son     Glaucoma Maternal Aunt     Diabetes Maternal Aunt     Cancer Maternal Grandfather         lung (smoker)    Prostate cancer Neg Hx     Heart disease Neg Hx     Kidney disease Neg Hx        Current Outpatient Medications   Medication Sig    albuterol (PROVENTIL) 2.5 mg /3 mL (0.083 %) nebulizer solution INHALE THE CONTENTS OF 1 VIAL VIA NEBULIZER EVERY 4 TO 6 HOURS AS NEEDED FOR WHEEZING OR FOR SHORTNESS OF BREATH    alcohol swabs (ALCOHOL PADS) PadM Apply 1 each topically 4 (four) times daily.    allopurinoL (ZYLOPRIM) 100 MG tablet Take 1 tablet (100 mg total) by mouth once daily.    amLODIPine (NORVASC) 2.5 MG tablet Take 1 tablet (2.5 mg total) by mouth once daily.    apixaban (ELIQUIS) 5 mg Tab TAKE 1 TABLET TWICE DAILY    atorvastatin (LIPITOR) 40 MG tablet Take 1 tablet (40 mg total) by mouth every evening.    brinzolamide-brimonidine (SIMBRINZA) 1-0.2 % DrpS Place 1 drop into both eyes 3 (three) times daily.    cyanocobalamin (VITAMIN B-12) 1000 MCG tablet Take 1 tablet by mouth once daily.    empagliflozin (JARDIANCE) 25 mg tablet Take 1 tablet (25 mg total) by mouth once daily.    fluticasone-umeclidin-vilanter (TRELEGY ELLIPTA) 200-62.5-25 mcg inhaler Inhale 1 puff into the lungs once daily.     furosemide (LASIX) 40 MG tablet Take 2 tablets (80 mg total) by mouth 2 (two) times a day.    gabapentin (NEURONTIN) 800 MG tablet Take 1 tablet (800 mg total) by mouth 3 (three) times daily.    insulin aspart U-100 (NOVOLOG FLEXPEN U-100 INSULIN) 100 unit/mL (3 mL) InPn pen INJECT 10 UNITS UNDER THE SKIN THREE TIMES DAILY WITH MEALS    INV allopurinol tablet Take 1 tablet by mouth once daily.    lancets (TRUEPLUS LANCETS) 33 gauge Misc Use to check blood sugar twice a day    latanoprost 0.005 % ophthalmic solution INSTILL 1 DROP INTO BOTH EYES ONE TIME DAILY BOTTLE EXPIRES 42 DAYS AFTER OPENING...    lisinopriL (PRINIVIL,ZESTRIL) 30 MG tablet TAKE 1 TABLET EVERY DAY.    lisinopriL 10 MG tablet TAKE 1 TABLET EVERY DAY    multivitamin-iron-folic acid (SENTRY) Tab Take 1 tablet by mouth once daily.    naproxen (NAPROSYN) 500 MG tablet Take 1 tablet (500 mg total) by mouth 2 (two) times daily with meals.    omalizumab (XOLAIR) 150 mg/mL injection Inject 2 mLs (300 mg total) into the skin every 14 (fourteen) days.    omalizumab (XOLAIR) 75 mg/0.5 mL injection Inject 0.5 mLs (75 mg total) into the skin every 14 (fourteen) days.    omeprazole (PRILOSEC) 20 MG capsule Take 20 mg by mouth every morning.    pantoprazole (PROTONIX) 40 MG tablet Take 1 tablet (40 mg total) by mouth once daily.    pep injection Inject 0.15 ml as directed     For compounding pharmacy use:   Add PAPAVERINE 30 mg  Add PHENTOLAMINE 1 mg  Add ALPROSTADIL 20 mcg    predniSONE (DELTASONE) 50 MG Tab Take 50 mg by mouth.    ranolazine (RANEXA) 1,000 mg Tb12 TAKE 1 TABLET TWICE DAILY    semaglutide (OZEMPIC) 2 mg/dose (8 mg/3 mL) PnIj Inject 2 mg into the skin every 7 days.    sertraline (ZOLOFT) 100 MG tablet Take 1 tablet (100 mg total) by mouth every evening.    tamsulosin (FLOMAX) 0.4 mg Cap Take 1 capsule (0.4 mg total) by mouth once daily.    traMADoL (ULTRAM) 50 mg tablet Take 50 mg by mouth.    traZODone (DESYREL) 100 MG tablet TAKE 2 TABLETS  "EVERY EVENING    VIOS AEROSOL DELIVERY SYSTEM Shefali USE AS DIRESTED    blood sugar diagnostic (TRUE METRIX GLUCOSE TEST STRIP) Strp USE A DIRECTED    blood-glucose meter Misc Use as instructed to test blood glucose meter daily.    EPINEPHrine (EPIPEN) 0.3 mg/0.3 mL AtIn Inject 0.3 mLs (0.3 mg total) into the muscle once. for 1 dose    lancets Misc 1 each by Misc.(Non-Drug; Combo Route) route 4 (four) times daily. Insurance preferred    papaverine-phentolamin-alprost 150 mg-5 mg- 50 mcg SolR 0.5 mLs by Intracavernosal route as needed (ED).    pen needle, diabetic (BD ULTRA-FINE SHORT PEN NEEDLE) 31 gauge x 5/16" Ndle 1 each by Misc.(Non-Drug; Combo Route) route 3 (three) times daily.     No current facility-administered medications for this visit.     Current Outpatient Medications on File Prior to Visit   Medication Sig    albuterol (PROVENTIL) 2.5 mg /3 mL (0.083 %) nebulizer solution INHALE THE CONTENTS OF 1 VIAL VIA NEBULIZER EVERY 4 TO 6 HOURS AS NEEDED FOR WHEEZING OR FOR SHORTNESS OF BREATH    alcohol swabs (ALCOHOL PADS) PadM Apply 1 each topically 4 (four) times daily.    allopurinoL (ZYLOPRIM) 100 MG tablet Take 1 tablet (100 mg total) by mouth once daily.    amLODIPine (NORVASC) 2.5 MG tablet Take 1 tablet (2.5 mg total) by mouth once daily.    apixaban (ELIQUIS) 5 mg Tab TAKE 1 TABLET TWICE DAILY    atorvastatin (LIPITOR) 40 MG tablet Take 1 tablet (40 mg total) by mouth every evening.    brinzolamide-brimonidine (SIMBRINZA) 1-0.2 % DrpS Place 1 drop into both eyes 3 (three) times daily.    cyanocobalamin (VITAMIN B-12) 1000 MCG tablet Take 1 tablet by mouth once daily.    empagliflozin (JARDIANCE) 25 mg tablet Take 1 tablet (25 mg total) by mouth once daily.    fluticasone-umeclidin-vilanter (TRELEGY ELLIPTA) 200-62.5-25 mcg inhaler Inhale 1 puff into the lungs once daily.    furosemide (LASIX) 40 MG tablet Take 2 tablets (80 mg total) by mouth 2 (two) times a day.    gabapentin (NEURONTIN) 800 MG tablet " Take 1 tablet (800 mg total) by mouth 3 (three) times daily.    insulin aspart U-100 (NOVOLOG FLEXPEN U-100 INSULIN) 100 unit/mL (3 mL) InPn pen INJECT 10 UNITS UNDER THE SKIN THREE TIMES DAILY WITH MEALS    INV allopurinol tablet Take 1 tablet by mouth once daily.    lancets (TRUEPLUS LANCETS) 33 gauge Misc Use to check blood sugar twice a day    latanoprost 0.005 % ophthalmic solution INSTILL 1 DROP INTO BOTH EYES ONE TIME DAILY BOTTLE EXPIRES 42 DAYS AFTER OPENING...    lisinopriL (PRINIVIL,ZESTRIL) 30 MG tablet TAKE 1 TABLET EVERY DAY.    lisinopriL 10 MG tablet TAKE 1 TABLET EVERY DAY    multivitamin-iron-folic acid (SENTRY) Tab Take 1 tablet by mouth once daily.    naproxen (NAPROSYN) 500 MG tablet Take 1 tablet (500 mg total) by mouth 2 (two) times daily with meals.    omalizumab (XOLAIR) 150 mg/mL injection Inject 2 mLs (300 mg total) into the skin every 14 (fourteen) days.    omalizumab (XOLAIR) 75 mg/0.5 mL injection Inject 0.5 mLs (75 mg total) into the skin every 14 (fourteen) days.    omeprazole (PRILOSEC) 20 MG capsule Take 20 mg by mouth every morning.    pantoprazole (PROTONIX) 40 MG tablet Take 1 tablet (40 mg total) by mouth once daily.    pep injection Inject 0.15 ml as directed     For compounding pharmacy use:   Add PAPAVERINE 30 mg  Add PHENTOLAMINE 1 mg  Add ALPROSTADIL 20 mcg    predniSONE (DELTASONE) 50 MG Tab Take 50 mg by mouth.    ranolazine (RANEXA) 1,000 mg Tb12 TAKE 1 TABLET TWICE DAILY    semaglutide (OZEMPIC) 2 mg/dose (8 mg/3 mL) PnIj Inject 2 mg into the skin every 7 days.    sertraline (ZOLOFT) 100 MG tablet Take 1 tablet (100 mg total) by mouth every evening.    tamsulosin (FLOMAX) 0.4 mg Cap Take 1 capsule (0.4 mg total) by mouth once daily.    traMADoL (ULTRAM) 50 mg tablet Take 50 mg by mouth.    traZODone (DESYREL) 100 MG tablet TAKE 2 TABLETS EVERY EVENING    VIOS AEROSOL DELIVERY SYSTEM Shefali USE AS DIRESTED    blood-glucose meter Misc Use as instructed to test blood glucose  "meter daily.    EPINEPHrine (EPIPEN) 0.3 mg/0.3 mL AtIn Inject 0.3 mLs (0.3 mg total) into the muscle once. for 1 dose    lancets Misc 1 each by Misc.(Non-Drug; Combo Route) route 4 (four) times daily. Insurance preferred    papaverine-phentolamin-alprost 150 mg-5 mg- 50 mcg SolR 0.5 mLs by Intracavernosal route as needed (ED).    pen needle, diabetic (BD ULTRA-FINE SHORT PEN NEEDLE) 31 gauge x 5/16" Ndle 1 each by Misc.(Non-Drug; Combo Route) route 3 (three) times daily.    [DISCONTINUED] blood sugar diagnostic Strp 1 each by Misc.(Non-Drug; Combo Route) route 4 (four) times daily.    [DISCONTINUED] diclofenac sodium (VOLTAREN) 1 % Gel APPLY 2 GRAMS TOPICALLY FOUR TIMES DAILY     No current facility-administered medications on file prior to visit.       Review of patient's allergies indicates:   Allergen Reactions    Protamine Hives     Urticaria, possible upper airway swelling       Review of Systems   Constitutional: Negative for diaphoresis, malaise/fatigue and weight gain.   HENT:  Negative for hoarse voice.    Eyes:  Negative for blurred vision, double vision and visual disturbance.   Cardiovascular:  Negative for chest pain, claudication, cyanosis, dyspnea on exertion, irregular heartbeat, leg swelling, near-syncope, orthopnea, palpitations, paroxysmal nocturnal dyspnea and syncope.   Respiratory:  Negative for cough, hemoptysis, shortness of breath and snoring.    Hematologic/Lymphatic: Negative for bleeding problem. Does not bruise/bleed easily.   Skin:  Negative for color change, dry skin, itching and poor wound healing.   Musculoskeletal:  Negative for falls, muscle cramps, muscle weakness and myalgias.   Gastrointestinal:  Negative for bloating, abdominal pain, change in bowel habit, diarrhea, heartburn, hematemesis, hematochezia, melena and nausea.   Genitourinary:  Negative for flank pain and hematuria.   Neurological:  Negative for excessive daytime sleepiness, dizziness, focal weakness, headaches, " light-headedness, loss of balance, numbness, paresthesias, seizures and weakness.   Psychiatric/Behavioral:  Negative for altered mental status and memory loss. The patient does not have insomnia and is not nervous/anxious.    Allergic/Immunologic: Negative for hives.       Objective:   Physical Exam  Vitals and nursing note reviewed.   Constitutional:       General: He is not in acute distress.     Appearance: He is well-developed. He is obese. He is not diaphoretic.   HENT:      Head: Normocephalic and atraumatic.   Eyes:      General:         Right eye: No discharge.         Left eye: No discharge.      Pupils: Pupils are equal, round, and reactive to light.   Neck:      Thyroid: No thyromegaly.      Vascular: Carotid bruit present. No JVD.   Cardiovascular:      Rate and Rhythm: Normal rate and regular rhythm.      Pulses: Intact distal pulses.           Carotid pulses are 2+ on the right side with bruit and 2+ on the left side with bruit.       Radial pulses are 1+ on the right side and 1+ on the left side.        Popliteal pulses are 1+ on the right side and 1+ on the left side.        Dorsalis pedis pulses are 0 on the right side and 0 on the left side.        Posterior tibial pulses are 0 on the right side and 0 on the left side.      Heart sounds: Normal heart sounds. No murmur heard.     No friction rub. No gallop.   Pulmonary:      Effort: Pulmonary effort is normal. No respiratory distress.      Breath sounds: Normal breath sounds. No wheezing or rales.   Chest:      Chest wall: No tenderness.   Abdominal:      General: Bowel sounds are normal. There is no distension.      Palpations: Abdomen is soft.      Tenderness: There is no abdominal tenderness.   Musculoskeletal:         General: Normal range of motion.      Cervical back: Neck supple.   Skin:     General: Skin is warm and dry.      Findings: No erythema or rash.   Neurological:      General: No focal deficit present.      Mental Status: He is  "alert and oriented to person, place, and time.      Cranial Nerves: No cranial nerve deficit.   Psychiatric:         Mood and Affect: Mood normal.         Behavior: Behavior normal.       Vitals:    02/14/24 1117 02/14/24 1123   BP: (!) 86/60 90/66   BP Location: Left arm Right arm   Patient Position: Sitting Sitting   BP Method: Medium (Manual) Medium (Manual)   Pulse: 61    SpO2: (!) 93%    Weight: 87.8 kg (193 lb 9 oz)    Height: 5' 5" (1.651 m)      Lab Results   Component Value Date    CHOL 117 07/08/2023    CHOL 103 05/23/2023    CHOL 152 06/10/2022     Body mass index is 32.21 kg/m².   Lab Results   Component Value Date    HGBA1C 6.4 (H) 09/29/2023      BMP  Lab Results   Component Value Date     10/30/2023     10/30/2023    K 3.9 10/30/2023    K 3.9 10/30/2023     10/30/2023     10/30/2023    CO2 28 10/30/2023    CO2 28 10/30/2023    BUN 16 10/30/2023    BUN 16 10/30/2023    CREATININE 1.2 10/30/2023    CREATININE 1.2 10/30/2023    CALCIUM 9.5 10/30/2023    CALCIUM 9.5 10/30/2023    ANIONGAP 11 10/30/2023    ANIONGAP 11 10/30/2023    EGFRNORACEVR >60.0 10/30/2023    EGFRNORACEVR >60.0 10/30/2023      Lab Results   Component Value Date    HDL 36 (L) 07/08/2023    HDL 41 05/23/2023    HDL 43 06/10/2022     Lab Results   Component Value Date    LDLCALC 62 07/08/2023    LDLCALC 46 05/23/2023    LDLCALC 88.8 06/10/2022     Lab Results   Component Value Date    TRIG 97 07/08/2023    TRIG 80 05/23/2023    TRIG 101 06/10/2022     Lab Results   Component Value Date    CHOLHDL 28.3 06/10/2022    CHOLHDL 26.7 05/25/2021    CHOLHDL 27.1 05/24/2021       Chemistry        Component Value Date/Time     10/30/2023 1250     10/30/2023 1250    K 3.9 10/30/2023 1250    K 3.9 10/30/2023 1250     10/30/2023 1250     10/30/2023 1250    CO2 28 10/30/2023 1250    CO2 28 10/30/2023 1250    BUN 16 10/30/2023 1250    BUN 16 10/30/2023 1250    CREATININE 1.2 10/30/2023 1250    CREATININE " 1.2 10/30/2023 1250     (H) 10/30/2023 1250     (H) 10/30/2023 1250        Component Value Date/Time    CALCIUM 9.5 10/30/2023 1250    CALCIUM 9.5 10/30/2023 1250    ALKPHOS 79 10/30/2023 1250    AST 21 10/30/2023 1250    ALT 15 10/30/2023 1250    BILITOT 0.2 10/30/2023 1250    ESTGFRAFRICA 43 07/28/2023 0438    ESTGFRAFRICA >60 07/31/2022 0634    EGFRNONAA >60 07/31/2022 0634          Lab Results   Component Value Date    TSH 0.881 05/23/2023     Lab Results   Component Value Date    INR 1.2 07/07/2023    INR 1.4 05/22/2023    INR 1.0 11/07/2022     Lab Results   Component Value Date    WBC 8.00 03/31/2023    HGB 11.2 (L) 03/31/2023    HCT 35.0 (L) 03/31/2023    MCV 80 (L) 03/31/2023     03/31/2023     BNP  @LABRCNTIP(BNP,BNPTRIAGEBLO)@  CrCl cannot be calculated (Patient's most recent lab result is older than the maximum 7 days allowed.).    Narrative & Impression  EXAMINATION:  US CAROTID BILATERAL     CLINICAL HISTORY:  Occlusion and stenosis of bilateral carotid arteries     TECHNIQUE:  Grayscale and color Doppler ultrasound examination of the carotid and vertebral artery systems bilaterally.  Stenosis estimates are per NASCET measurement criteria.     COMPARISON:  03/03/2022.     FINDINGS:  Right:     Peak systolic velocity ICA: 334 cm/sec     ICA/CCA velocity ratio: 4.9.     Plaque formation: Moderate to marked calcified plaque in CCA, carotid bulb, and ICA.     Vertebral artery: Antegrade flow and normal waveform.     Left:     Peak systolic velocity ICA: 100 cm/sec     ICA/CCA velocity ratio: 0.5.     Plaque formation: Mild-to-moderate plaque in CCA, carotid bulb, and ICA.     Vertebral artery: Antegrade flow and normal waveform.     Miscellaneous:     Elevated velocity up to 214 cm/sec in the proximal to mid left CCA.     Impression:     1. Findings consistent with greater than 70% stenosis right ICA.  2. Elevated left CCA velocity consistent with stenosis.  .        Electronically  signed by: KELSY Potts MD  Date:                                            11/28/2023  Time:                                           10:14  Assessment:     1. Bilateral carotid artery stenosis    2. PAF (paroxysmal atrial fibrillation)    3. Chronic combined systolic and diastolic heart failure    4. Coronary artery disease of native artery of native heart with stable angina pectoris    5. Atherosclerosis of aorta    6. Acute on chronic combined systolic and diastolic heart failure    7. Essential hypertension    8. Diabetic polyneuropathy associated with type 2 diabetes mellitus    9. SANDRITA (obstructive sleep apnea)    10. At risk for medication noncompliance    11. Obesity (BMI 30.0-34.9)    12. Gastroesophageal reflux disease without esophagitis    13. Hyperlipidemia associated with type 2 diabetes mellitus    14. Diabetic ulcer of toe of left foot associated with type 2 diabetes mellitus, with necrosis of muscle    15. Ulcer of left foot with other severity    16. Atherosclerosis of native coronary artery of native heart with unstable angina pectoris    17. Substance abuse(UDS + for cocaine )    18. NICM (nonischemic cardiomyopathy)      Paf s/p ablation non clinically on eliquis stable  Carotid stenosis asymptomatic will repeat carotid us.    Pvd s/p left big toe amputation with non limiting claudication will obtain jaida arterial duplex.  Diabetes with good control continue same drug abuse still occurring helps his pain counseled about abstinence  Cad non obstructive asymptomatic continue addressing risk factors.  Cardiomyopathy well compensated no chf clinically.  Plan:   Carotid us    Jaida   Bilateral lower extremity duplex.  Phone review  Labs per Dr Zhao

## 2024-02-14 NOTE — LETTER
February 14, 2024      Eduardo Castillo PA-C  [address]  [place] [address]         Patient: Crow Green   MR Number: [id number]   YOB: 1955   Date of Visit: 2/14/2024       Dear Dr. Castillo:    Thank you for referring Crow for diabetes self-management education and support services. He has completed all components of our Diabetes Management Program. Below is a summary of his clinical outcomes and goal progress.    Patient Outcomes:    A1c Status:   Lab Results   Component Value Date    HGBA1C 6.4 (H) 09/29/2023    HGBA1C 6.2 05/23/2023    HGBA1C 6.3 (H) 01/04/2023       Care Plan: Diabetes Management   Updates made since 1/15/2024 12:00 AM        Problem: Blood Glucose Self-Monitoring         Goal: Patient agrees to monitor blood glucose using personal Dexcom G6. Completed 2/14/2024   Start Date: 7/20/2023   Expected End Date: 7/14/2024   This Visit's Progress: No change   Recent Progress: No change   Priority: High   Barriers: Other (comments)   Note:    Encouraged consistency of BG testing using iHealth meter and to bring meter to clinic to assist with data syncing. Message to provider team to assist with medical mgmt appt for additional eval. Pt may need Rx Dexcom  since phone isn't compatible.     12/13/23: Unable to download Dexcom mobile apps onto new phone since it's not compatible. Pt agrees to resume Dexcom G6 on old phone. He has ~2mos G6 supplies and wishes to complete these before starting G7.       Instructed pt on how to check Dexcom mobile compatibility using online resources.   Of note, pt's original email is [email or web address]. Resent email invite since Clarity account wasn't pulling data correctly.     ___________  Last visit - history info:   iCloud Account:  [email or web address]  [email or web address]     Dexcom Account:  [email or web address]  [email or web address]           Problem: Healthy Eating         Goal: Eat 3 meals daily -  manage carb 30-45grams/meal, 5-15grams/snack. Completed 2/14/2024   Start Date: 11/28/2023   Expected End Date: 7/14/2024   This Visit's Progress: Not met   Recent Progress: No change   Priority: Medium   Barriers: Lack of Motivation to Change; Financial; Other (comments)   Note:    Again, discussed role meal spacing, carb/pro balance on BG stability & overall health with strategies to support including small meals and store brand MR shake. Encouraged fu w/ pcp to discuss excessive fatigue and sleep since pt reports this as barrier to progress. Suggested setting phone alerts to assist with diabetes care and meal plan schedule.             Problem: Medications         Goal: Patient Agrees to take Diabetes Medication(s) as prescribed. Completed 2/14/2024   Start Date: 11/28/2023   Expected End Date: 7/14/2024   This Visit's Progress: Not met   Recent Progress: On track   Priority: Low   Barriers: Lack of Motivation to Change   Note:    Message to provider team to assist with eval since pt feels he needs higher bolus dose correction and is using significantly more than recommended at provider Guillermina last visit encounter. Pt agrees to fu w/ provider team for medical mgmt.          Follow up:   Crow to attend medical appointments as scheduled  Crow to update you on his DM education progress as needed      If you have questions, please do not hesitate to call me. I look forward to providing additional education and support as needed.    Sincerely,    Debbie Escobar RD, CDE  Diabetes Care and

## 2024-02-14 NOTE — PROGRESS NOTES
"Subjective:      Patient ID: Crow Green is a 68 y.o. male.    Chief Complaint: Asthma    Asthma  His past medical history is significant for asthma.       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 Hernandez and was previously on Xolair.  Last seen by Dr. Kimbrough in August of last year.  Here for follow-up.  Complaints of chronic dyspnea.  He is on max medical therapy with Trelegy 200 and as needed albuterol.  He has not been on Xolair in a number of months.  He also has dermatomyositis in his followed by Rheumatology.  Most recent labs indicate elevated sed rate at 92.  Most recent IgE level was 1700 a few months ago.    Review of Systems as per history of present illness otherwise negative  Objective:     Physical Exam   Constitutional: He is oriented to person, place, and time. He appears well-developed. No distress. He is obese.   HENT:   Head: Normocephalic.   Cardiovascular: Normal rate and regular rhythm.   Pulmonary/Chest: Normal expansion, symmetric chest wall expansion and effort normal. He has no wheezes.   Abdominal: Soft.   Musculoskeletal:      Cervical back: Neck supple.   Neurological: He is alert and oriented to person, place, and time.   Psychiatric: He has a normal mood and affect.   Nursing note and vitals reviewed.          2/14/2024     9:18 AM 2/14/2024     8:29 AM 2/14/2024     8:06 AM 1/29/2024    10:25 AM 1/18/2024     3:02 PM 1/18/2024     2:50 PM 1/18/2024     2:00 PM   Pulmonary Function Tests   SpO2 94 %   98 % 94 % 92 % 94 %   Height 5' 5" (1.651 m) 5' 5" (1.651 m) 5' 5" (1.651 m) 5' 5" (1.651 m)      Weight 87.7 kg (193 lb 5.5 oz) 87.2 kg (192 lb 3.9 oz) 87.2 kg (192 lb 3.9 oz) 88 kg (194 lb 0.1 oz)      BMI (Calculated) 32.2 32 32 32.3           Assessment:     1. Moderate persistent extrinsic asthma without complication    2. Chronic obstructive pulmonary disease, unspecified COPD type    3. Immunocompromised        Plan:     Discussed need to get " back in with allergist to continue Xolair treatment given his markedly elevated IgE level  On maximal inhaled therapy for his asthma  No new pulmonary recommendations at this time  Discussed need to become more compliant as well as significant need for weight loss  We will see him back in 6 months, sooner if needed

## 2024-02-14 NOTE — PROGRESS NOTES
Diabetes Care Specialist Follow-up Note  Author: Debbie Escobar RD, CDE  Date: 2/14/2024    Program Intake  Reason for Diabetes Program Visit:: Post Program Follow-Up (DM referral 7/14/23, 10/6/23 Eduardo Castillo, PACATERINA)  Type of Intervention:: Individual  Device Training:  (Dexcom follow-up - pt hasn't resumed G6 supplies with old phone and hasnn't received G7.)  Current diabetes risk level:: high  Permission to speak with others about care:: no  Continuous Glucose Monitoring  Personal CGM type:: Pt hasn't resumed Dexcom G6 on old phone, and his new phone isn't compatible.    Lab Results   Component Value Date    HGBA1C 6.4 (H) 09/29/2023     Clinical    Problem Review  Active comorbidities affecting diabetes self-care.:  (HTN, HLD, CAD, CHF, Retinopathy, Neuropathy, COPD, Obesity by BMI, SANDRITA, Schizoaffective ds, Substance abuse.)    Clinical Assessment  Current Diabetes Treatment:  (Ozempic 2mg weekly. Jardiance 25mg 1tab daily. Novolog correction per s/s.)  Have you ever experienced hypoglycemia (low blood sugar)?: yes (irregular freq related to bolus overcorrection)  Are you able to tell when your blood sugar is low?: Yes  What symptoms do you experience?: Shaky  Have you ever been hospitalized because your blood sugar was too low?: no  How do you treat hypoglycemia (low blood sugar)?: 1/2 can soda/fruit juice  Have you ever experienced hyperglycemia (high blood sugar)?: yes  In the last month, how often have you experienced high blood sugar?: more than once a day  What are your symptoms?: fatigue  Have you ever been hospitalized because your blood sugar was high?: no    Medication Information  How many days a week do you miss your medications?: 3 or more (Pt reports using correction bolus ~2x/d and using more than directed (ex: 30-40units).)  Do you sometimes have difficulty refilling your medications?: No  Medication adherence impacting ability to self-manage diabetes?: Yes    Labs  Do you have  regular lab work to monitor your medications?: Yes  Type of Regular Lab Work: A1c  Where do you get your labs drawn?: [facility]  Lab Compliance Barriers: No    Nutritional Status  Diet:  (Irregular meal patterns. Not using MR shake/entree.)  Meal Plan 24 Hour Recall - Breakfast: none  Meal Plan 24 Hour Recall - Lunch: none OR shrimp & sausage w/ tomato gravy on rice  Meal Plan 24 Hour Recall - Dinner: cornflakes, 2% milk  Meal Plan 24 Hour Recall - Snack: pear/canned  Change in appetite?: Yes (decreased)  Recent Changes in Weight: Weight Loss  Was weight loss intentional or unintentional?: Unintentional (~6lbs since 1/24)  Current nutritional status an area of need that is impacting patient's ability to self-manage diabetes?: Yes    Physical activity/Exercise: None regular    SMBG: No data avail to review    Diabetes Self-Management Skills Assessment     Home Blood Glucose Monitoring  Patient states that blood sugar is checked at home daily.: yes  Monitoring Method:: home glucometer, personal continuous glucose monitor  Home glucometer meter type:: iHealth - meter unavail and readings are not synced  Personal CGM type:: Pt hasn't resumed Dexcom G6 on old phone, and his new phone isn't compatible.  Home Blood Glucose Monitoring Skills Assessment Completed: : Yes  Assessment indicates:: Adequate understanding  Area of need?: Yes    During today's follow-up visit,  the following areas required further assessment and content was provided/reviewed.  Based on today's diabetes care assessment, the following areas of need were identified:           2/14/2024    12:02 AM   Clinical   Medication Adherence Yes - see care plan   Lab Compliance No   Nutritional Status Yes - see care plan          2/14/2024    12:02 AM   Diabetes Self-Management Skills   Home Blood Glucose Monitoring Yes - see care plan      Today's interventions were provided through individual discussion, instruction, and written materials were provided.     Patient verbalized understanding of instruction and written materials.  Pt was able to return back demonstration of instructions today. Patient understood key points, needs reinforcement and further instruction.     Diabetes Self-Management Care Plan Review and Evaluation of Progress:During today's follow-up Crow's Diabetes Self-Management Care Plan progress was reviewed and progress was evaluated including his/her input. Crow has agreed to continue his/her journey to improve/maintain overall diabetes control by continuing to set health goals. See care plan progress below.      Care Plan: Diabetes Management   Updates made since 1/15/2024 12:00 AM        Problem: Blood Glucose Self-Monitoring         Goal: Patient agrees to monitor blood glucose using personal Dexcom G6. Completed 2/14/2024   Start Date: 7/20/2023   Expected End Date: 7/14/2024   This Visit's Progress: No change   Recent Progress: No change   Priority: High   Barriers: Other (comments)   Note:    Encouraged consistency of BG testing using iHealth meter and to bring meter to clinic to assist with data syncing. Message to provider team to assist with medical mgmt appt for additional eval. Pt may need Rx Dexcom  since phone isn't compatible.     12/13/23: Unable to download Dexcom mobile apps onto new phone since it's not compatible. Pt agrees to resume Dexcom G6 on old phone. He has ~2mos G6 supplies and wishes to complete these before starting G7.       Instructed pt on how to check Dexcom mobile compatibility using online resources.   Of note, pt's original email is [email or web address]. Resent email invite since Cl[employer] account wasn't pulling data correctly.     ___________  Last visit - history info:   iCloud Account:  [email or web address]  [email or web address]     Dexcom Account:  [email or web address]  [email or web address]           Problem: Healthy Eating         Goal: Eat 3 meals daily - manage carb 30-45grams/meal,  5-15grams/snack. Completed 2/14/2024   Start Date: 11/28/2023   Expected End Date: 7/14/2024   This Visit's Progress: Not met   Recent Progress: No change   Priority: Medium   Barriers: Lack of Motivation to Change; Financial; Other (comments)   Note:    Again, discussed role meal spacing, carb/pro balance on BG stability & overall health with strategies to support including small meals and store brand MR shake. Encouraged fu w/ pcp to discuss excessive fatigue and sleep since pt reports this as barrier to progress. Suggested setting phone alerts to assist with diabetes care and meal plan schedule.             Problem: Medications         Goal: Patient Agrees to take Diabetes Medication(s) as prescribed. Completed 2/14/2024   Start Date: 11/28/2023   Expected End Date: 7/14/2024   This Visit's Progress: Not met   Recent Progress: On track   Priority: Low   Barriers: Lack of Motivation to Change   Note:    Message to provider team to assist with eval since pt feels he needs higher bolus dose correction and is using significantly more than recommended at provider Guillermina last visit encounter. Pt agrees to fu w/ provider team for medical mgmt.          Follow Up Plan   Follow up if symptoms worsen or fail to improve.    Today's care plan and follow up schedule was discussed with patient.  Crow verbalized understanding of the care plan, goals, and agrees to follow up plan.        The patient was encouraged to communicate with his/her health care provider/physician and care team regarding his/her condition(s) and treatment.  I provided the patient with my contact information today and encouraged to contact me via phone or [facility]'s Patient Portal as needed.     Length of Visit   Total Time: 30 Minutes

## 2024-02-14 NOTE — PROGRESS NOTES
Orthopaedic Follow-Up Visit    Last Appointment: 2/22/22  Diagnosis: S/P left Reverse Total Shoulder Arthroplasty, second post-operative visit - routine healing   Prior Procedure: None    Crow Green is a 68 y.o. male who is here for evaluation of his bilateral shoulders. The patient was last seen here by me on 2/22/22 s/p left reverse TSA (DOS: 1/10/22). The patient returns today reporting bilateral shoulder pain that has persisted since a fall on 1/16/24 after slipping on ice. He was seen in the ER of 1/18/24 and XR revealed nothing acute. Followed-up with PCP on 1/29/24 and noted no improvement with norco or lidocaine patches at home. No improvement with tylenol. Was referred to orthopedics for further evaluation.  I have also treated him for his right shoulder rotator cuff tear arthropathy.  He has tried rest, activity modification, anti-inflammatories, corticosteroid injection, and physical therapy.    To review his history, Crow Green is a 68 y.o. male who was previously seen s/p left reverse TSA (DOS: 1/10/22). He has known right shoulder glenohumeral arthritis as well     Patient's medications, allergies, past medical, surgical, social and family histories were reviewed and updated as appropriate.    Review of Systems   All systems reviewed were negative.  Specifically, the patient denies fever, chills, weight loss, chest pain, shortness of breath, or dyspnea on exertion.      Past Medical History:   Diagnosis Date    Arthritis     Asthma     Atrial fibrillation     Atrial fibrillation with RVR 08/07/2019    Carotid artery stenosis     CHF (congestive heart failure)     Chronic obstructive pulmonary disease 08/05/2020    Depression     Dermatomyositis     Diabetes mellitus, type 2 Diagnosed in 2000    Elevated PSA     Follow up 07/30/2020    Hypertension     Myocardial infarction     2017    Sleep apnea        Past Surgical History:   Procedure Laterality Date    ABLATION OF ARRHYTHMOGENIC  FOCUS FOR ATRIAL FIBRILLATION N/A 2/27/2020    Procedure: Ablation atrial fibrillation;  Surgeon: Gio Brown MD;  Location: [facility] EP LAB;  Service: Cardiology;  Laterality: N/A;  afib, DAMIEN (cx if SR), PVI, PITO, anes, MB, 3 Prep    CHOLECYSTECTOMY      CLOSURE OF WOUND Left 7/1/2020    Procedure: CLOSURE, WOUND;  Surgeon: Barb Veras DPM;  Location: [facility] OR;  Service: Podiatry;  Laterality: Left;    COLONOSCOPY N/A 3/4/2022    Procedure: COLONOSCOPY;  Surgeon: Yolis Freitas MD;  Location: [facility] ENDO;  Service: Endoscopy;  Laterality: N/A;    ESOPHAGOGASTRODUODENOSCOPY N/A 3/4/2022    Procedure: EGD (ESOPHAGOGASTRODUODENOSCOPY);  Surgeon: Yolis Freitas MD;  Location: [facility] ENDO;  Service: Endoscopy;  Laterality: N/A;    INJECTION OF ANESTHETIC AGENT INTO SACROILIAC JOINT Left 3/24/2021    Procedure: Left BLOCK, SACROILIAC JOINT and bilateral rhomboid TPI;  Surgeon: Dillan Tsai MD;  Location: [facility] PAIN MGT;  Service: Pain Management;  Laterality: Left;    INTRALUMINAL GASTROINTESTINAL TRACT IMAGING VIA CAPSULE N/A 3/22/2022    Procedure: IMAGING PROCEDURE, GI TRACT, INTRALUMINAL, VIA CAPSULE;  Surgeon: Justice Martinez RN;  Location: [facility] ENDO;  Service: Endoscopy;  Laterality: N/A;    REVERSE TOTAL SHOULDER ARTHROPLASTY Left 1/10/2022    Procedure: ARTHROPLASTY, SHOULDER, TOTAL, REVERSE;  Surgeon: Anthony Alvarado MD;  Location: [facility];  Service: Orthopedics;  Laterality: Left;  left reverse total shoulder arthroplasty     SELECTIVE INJECTION OF ANESTHETIC AGENT AROUND LUMBAR SPINAL NERVE ROOT BY TRANSFORAMINAL APPROACH Left 6/11/2020    Procedure: BLOCK, SPINAL NERVE ROOT, LUMBAR, SELECTIVE, TRANSFORAMINAL APPROACH Left L4-5, L5-S1 TFESI with RN IV sedation;  Surgeon: Dillan Tsai MD;  Location: [facility] PAIN MGT;  Service: Pain Management;  Laterality: Left;    TOE AMPUTATION Left 6/29/2020    Procedure: AMPUTATION, TOE;  Surgeon: Barb Veras DPM;  Location: [facility];  Service: Podiatry;   Laterality: Left;  great toe       Patient's Medications   New Prescriptions    No medications on file   Previous Medications    ALBUTEROL (PROVENTIL) 2.5 MG /3 ML (0.083 %) NEBULIZER SOLUTION    INHALE THE CONTENTS OF 1 VIAL VIA NEBULIZER EVERY 4 TO 6 HOURS AS NEEDED FOR WHEEZING OR FOR SHORTNESS OF BREATH    ALCOHOL SWABS (ALCOHOL PADS) PADM    Apply 1 each topically 4 (four) times daily.    ALLOPURINOL (ZYLOPRIM) 100 MG TABLET    Take 1 tablet (100 mg total) by mouth once daily.    AMLODIPINE (NORVASC) 2.5 MG TABLET    Take 1 tablet (2.5 mg total) by mouth once daily.    APIXABAN (ELIQUIS) 5 MG TAB    TAKE 1 TABLET TWICE DAILY    ATORVASTATIN (LIPITOR) 40 MG TABLET    Take 1 tablet (40 mg total) by mouth every evening.    BLOOD SUGAR DIAGNOSTIC STRP    1 each by Misc.(Non-Drug; Combo Route) route 4 (four) times daily.    BLOOD-GLUCOSE METER MISC    Use as instructed to test blood glucose meter daily.    BRINZOLAMIDE-BRIMONIDINE (SIMBRINZA) 1-0.2 % DRPS    Place 1 drop into both eyes 3 (three) times daily.    CYANOCOBALAMIN (VITAMIN B-12) 1000 MCG TABLET    Take 1 tablet by mouth once daily.    EMPAGLIFLOZIN (JARDIANCE) 25 MG TABLET    Take 1 tablet (25 mg total) by mouth once daily.    EPINEPHRINE (EPIPEN) 0.3 MG/0.3 ML ATIN    Inject 0.3 mLs (0.3 mg total) into the muscle once. for 1 dose    FLUTICASONE-UMECLIDIN-VILANTER (TRELEGY ELLIPTA) 200-62.5-25 MCG INHALER    Inhale 1 puff into the lungs once daily.    FUROSEMIDE (LASIX) 40 MG TABLET    Take 2 tablets (80 mg total) by mouth 2 (two) times a day.    GABAPENTIN (NEURONTIN) 800 MG TABLET    Take 1 tablet (800 mg total) by mouth 3 (three) times daily.    INSULIN ASPART U-100 (NOVOLOG FLEXPEN U-100 INSULIN) 100 UNIT/ML (3 ML) INPN PEN    INJECT 10 UNITS UNDER THE SKIN THREE TIMES DAILY WITH MEALS    INV ALLOPURINOL TABLET    Take 1 tablet by mouth once daily.    LANCETS (TRUEPLUS LANCETS) 33 GAUGE MISC    Use to check blood sugar twice a day    LANCETS MISC     "1 each by Misc.(Non-Drug; Combo Route) route 4 (four) times daily. Insurance preferred    LATANOPROST 0.005 % OPHTHALMIC SOLUTION    INSTILL 1 DROP INTO BOTH EYES ONE TIME DAILY BOTTLE EXPIRES 42 DAYS AFTER OPENING...    LISINOPRIL (PRINIVIL,ZESTRIL) 30 MG TABLET    TAKE 1 TABLET EVERY DAY.    LISINOPRIL 10 MG TABLET    TAKE 1 TABLET EVERY DAY    MULTIVITAMIN-IRON-FOLIC ACID (SENTRY) TAB    Take 1 tablet by mouth once daily.    NAPROXEN (NAPROSYN) 500 MG TABLET    Take 1 tablet (500 mg total) by mouth 2 (two) times daily with meals.    OMALIZUMAB (XOLAIR) 150 MG/ML INJECTION    Inject 2 mLs (300 mg total) into the skin every 14 (fourteen) days.    OMALIZUMAB (XOLAIR) 75 MG/0.5 ML INJECTION    Inject 0.5 mLs (75 mg total) into the skin every 14 (fourteen) days.    OMEPRAZOLE (PRILOSEC) 20 MG CAPSULE    Take 20 mg by mouth every morning.    PANTOPRAZOLE (PROTONIX) 40 MG TABLET    Take 1 tablet (40 mg total) by mouth once daily.    PAPAVERINE-PHENTOLAMIN-ALPROST 150 MG-5 MG- 50 MCG SOLR    0.5 mLs by Intracavernosal route as needed (ED).    PEN NEEDLE, DIABETIC (BD ULTRA-FINE SHORT PEN NEEDLE) 31 GAUGE X 5/16" NDLE    1 each by Misc.(Non-Drug; Combo Route) route 3 (three) times daily.    PEP INJECTION    Inject 0.15 ml as directed     For compounding pharmacy use:   Add PAPAVERINE 30 mg  Add PHENTOLAMINE 1 mg  Add ALPROSTADIL 20 mcg    PREDNISONE (DELTASONE) 50 MG TAB    Take 50 mg by mouth.    RANOLAZINE (RANEXA) 1,000 MG TB12    TAKE 1 TABLET TWICE DAILY    SEMAGLUTIDE (OZEMPIC) 2 MG/DOSE (8 MG/3 ML) PNIJ    Inject 2 mg into the skin every 7 days.    SERTRALINE (ZOLOFT) 100 MG TABLET    Take 1 tablet (100 mg total) by mouth every evening.    TAMSULOSIN (FLOMAX) 0.4 MG CAP    Take 1 capsule (0.4 mg total) by mouth once daily.    TRAMADOL (ULTRAM) 50 MG TABLET    Take 50 mg by mouth.    TRAZODONE (DESYREL) 100 MG TABLET    TAKE 2 TABLETS EVERY EVENING    VIOS AEROSOL DELIVERY SYSTEM VIN    USE AS DIRESTED   Modified " Medications    No medications on file   Discontinued Medications    No medications on file       Family History   Problem Relation Age of Onset    Hypertension Mother     Glaucoma Mother     Cataracts Mother     Diabetes Mother     Cataracts Father     Stroke Father     Glaucoma Sister         x3    Cataracts Sister         x3    Diabetes Sister         x1    Stroke Sister         x1    No Known Problems Brother     No Known Problems Daughter     No Known Problems Son     Glaucoma Maternal Aunt     Diabetes Maternal Aunt     Cancer Maternal Grandfather         lung (smoker)    Prostate cancer Neg Hx     Heart disease Neg Hx     Kidney disease Neg Hx        Review of patient's allergies indicates:   Allergen Reactions    Protamine Hives     Urticaria, possible upper airway swelling         Objective:      Physical Exam  Patient is alert and oriented, no distress. Skin is intact. Neuro is normal with no focal motor or sensory findings.    Cervical exam is unremarkable. Intact cervical ROM. Negative Spurling's test    Physical Exam:  RIGHT    LEFT    Scap Dyskinesis/Winging (-)    (-)    Tenderness:          Greater Tuberosity  +    (-)  Bicipital Groove  (-)    (-)  AC joint   (-)    (-)  Other:     ROM:  Forward Elevation 150    160  Abduction  100    110  ER (at side)  40    50  IR   Back pocket   Back pocket    Strength:   Supraspinatus  4/5      Infraspinatus  4/5      Subscap / IR  5/5         Special Tests:   Neer:    +    (-)   Arevalo:   +    (-)   SS Stress:   +    (-)   Bear Hug:   (-)    (-)   Olney's:   +    (-)   Resisted Thrower's:   +    (-)   Cross Arm Abduction:  (-)    (-)    Neurovascular examination  - Motor grossly intact bilaterally to shoulder abduction, elbow flexion and extension, wrist flexion and extension, and intrinsic hand musculature  - Sensation intact to light touch bilaterally in axillary, median, radial, and ulnar distributions  - Symmetrical radial pulses    Imaging:    XR  Results:  Results for orders placed during the hospital encounter of 01/29/24    X-Ray Shoulder 2 or More views Bilateral    Narrative  EXAMINATION:  XR SHOULDER COMPLETE 2 OR MORE VIEWS BILATERAL    CLINICAL HISTORY:  Pain in right shoulder    TECHNIQUE:  Four views were obtained of the bilateral shoulders.    COMPARISON:  03/29/2022    FINDINGS:  Postoperative findings from prior left shoulder arthroplasty are again noted with reversed prosthesis in place.  No findings to suggest hardware complication.  No acute fractures or dislocations visualized.  Mild AC joint arthrosis on the left is unchanged.  There is moderate glenohumeral osteoarthritis on the right as well as mild right-sided AC joint arthrosis.    Impression  Degenerative and postoperative findings as above      Electronically signed by: Jame Stephens MD  Date:    01/29/2024  Time:    12:25      MRI Results:  No results found for this or any previous visit.      CT Results:  No results found for this or any previous visit.      Physician read: I agree with the above impression.    Assessment/Plan:   Crow Green is a 68 y.o. male with right shoulder advanced glenohumeral osteoarthritis, rotator cuff tear arthropathy and 2 years s/p left reverse TSA (DOS: 1/10/22) doing well     Plan:    Discussed diagnosis and treatment options with the patient today. His Xrays shows end stage glenohumeral arthritis of his right shoulder as result of rotator cuff tear arthropathy. His symptoms and physical is consistent with this as well. He is also 2 years s/p left reverse TSA and is doing well following this. Reassured him that his X-rays of his left shoulder show appropriate implant alignment with no evidence of loosening.   Discussed non-operative treatment options in the form of rest, activity modifications, oral anti-inflammatories, corticosteroid injections, and physical therapy/physician directed home exercise program versus potential operative  treatment in the form of reverse total shoulder arthroplasty.  We have tried extensive nonoperative management and despite these treatments he continues to be symptomatic.  I explained for this option he would need to complete more than 6 weeks of physical therapy like he did for the left shoulder.  I recommend proceeding with right shoulder reverse total shoulder arthroplasty.     We reviewed the proposed procedure in detail, which included discussion of risks and benefits, techniques, and possible complications of the procedure. Risks include infection, bleeding, damage to artery and nerves, continual pain and possible stiffness, and blood clots. We reviewed the post-operative restrictions, recovery period, and rehabilitation.  All patient questions were answered. Despite the risks, he elected to proceed with surgery and the consent was freely signed.  At least 10 minutes were spent instructing the patient in home care following surgery including, but not limited to: sling use, sleeping, hygiene, post-operative exercises, preventing post-operative complications, etc.  All questions were answered.  This service was performed under the direction of Anthony Alvarado MD.  CPT 51670-PC.  We will message PCP for surgical clearance before penciling in the surgical date.  Follow up with me 10-14 days after surgery        Anthony Alvarado MD    I, Lawrence Waters, acted as a scribe for Anthony Alvarado MD for the duration of this office visit.

## 2024-02-15 ENCOUNTER — TELEPHONE (OUTPATIENT)
Dept: INTERNAL MEDICINE | Facility: CLINIC | Age: 69
End: 2024-02-15
Payer: MEDICARE

## 2024-02-15 NOTE — TELEPHONE ENCOUNTER
----- Message from Femi Vann sent at 2/14/2024  9:31 AM CST -----  Regarding: Preop Clearance  Good Morning,    Dr. Alvarado saw this patient today for referral from his shoulder from Mrs. Pastor. We are planning to proceed with right reverse total shoulder arthroplasty. We have not picked a date yet as Dr. Alvarado wanted him to receive pre-op clearance from his PCP first. Can you please schedule patient for an appointment for preop clearance and let us know once he has been cleared so we can move forward with scheduling his surgery?    Thank you,    Femi Vann, MS, LAT, ATC, OTC  Sports Medicine Assistant to Dr. Anthony Alvarado  [facility]

## 2024-02-20 ENCOUNTER — TELEPHONE (OUTPATIENT)
Dept: INTERNAL MEDICINE | Facility: CLINIC | Age: 69
End: 2024-02-20
Payer: MEDICARE

## 2024-02-20 NOTE — TELEPHONE ENCOUNTER
Tried calling the patient concerning his pre-op clearance appointment. Patient Voicemail system stated your call can not be completed at this time try again later.

## 2024-02-20 NOTE — PROGRESS NOTES
Call [facility] for her labs. She had her labs in Jan, and I never received. (This is occurring frequently with [facility])  Call Dr. Levy FUENTES  for the last note    Diabetic supplies sent in   Swabbed 01/07/2022

## 2024-02-27 ENCOUNTER — TELEPHONE (OUTPATIENT)
Dept: PULMONOLOGY | Facility: CLINIC | Age: 69
End: 2024-02-27
Payer: MEDICARE

## 2024-02-27 ENCOUNTER — TELEPHONE (OUTPATIENT)
Dept: DIABETES | Facility: CLINIC | Age: 69
End: 2024-02-27
Payer: MEDICARE

## 2024-02-27 ENCOUNTER — OFFICE VISIT (OUTPATIENT)
Dept: INTERNAL MEDICINE | Facility: CLINIC | Age: 69
End: 2024-02-27
Payer: MEDICARE

## 2024-02-27 VITALS
BODY MASS INDEX: 32.03 KG/M2 | SYSTOLIC BLOOD PRESSURE: 120 MMHG | OXYGEN SATURATION: 97 % | WEIGHT: 192.44 LBS | RESPIRATION RATE: 18 BRPM | TEMPERATURE: 98 F | DIASTOLIC BLOOD PRESSURE: 76 MMHG | HEART RATE: 71 BPM

## 2024-02-27 DIAGNOSIS — I48.0 PAF (PAROXYSMAL ATRIAL FIBRILLATION): Chronic | ICD-10-CM

## 2024-02-27 DIAGNOSIS — I65.23 BILATERAL CAROTID ARTERY STENOSIS: ICD-10-CM

## 2024-02-27 DIAGNOSIS — I10 ESSENTIAL HYPERTENSION: ICD-10-CM

## 2024-02-27 DIAGNOSIS — J45.40 MODERATE PERSISTENT EXTRINSIC ASTHMA WITHOUT COMPLICATION: ICD-10-CM

## 2024-02-27 DIAGNOSIS — Z99.81 DEPENDENCE ON SUPPLEMENTAL OXYGEN: ICD-10-CM

## 2024-02-27 DIAGNOSIS — Z79.4 TYPE 2 DIABETES MELLITUS WITH MILD NONPROLIFERATIVE RETINOPATHY AND MACULAR EDEMA, WITH LONG-TERM CURRENT USE OF INSULIN, UNSPECIFIED LATERALITY: ICD-10-CM

## 2024-02-27 DIAGNOSIS — E11.3219 TYPE 2 DIABETES MELLITUS WITH MILD NONPROLIFERATIVE RETINOPATHY AND MACULAR EDEMA, WITH LONG-TERM CURRENT USE OF INSULIN, UNSPECIFIED LATERALITY: ICD-10-CM

## 2024-02-27 DIAGNOSIS — Z79.01 ON ANTICOAGULANT THERAPY: ICD-10-CM

## 2024-02-27 DIAGNOSIS — E11.69 HYPERLIPIDEMIA ASSOCIATED WITH TYPE 2 DIABETES MELLITUS: ICD-10-CM

## 2024-02-27 DIAGNOSIS — Z79.4 TYPE 2 DIABETES MELLITUS WITH HYPOGLYCEMIA WITHOUT COMA, WITH LONG-TERM CURRENT USE OF INSULIN: ICD-10-CM

## 2024-02-27 DIAGNOSIS — G47.33 OSA (OBSTRUCTIVE SLEEP APNEA): ICD-10-CM

## 2024-02-27 DIAGNOSIS — I50.42 CHRONIC COMBINED SYSTOLIC AND DIASTOLIC HEART FAILURE: ICD-10-CM

## 2024-02-27 DIAGNOSIS — E11.649 TYPE 2 DIABETES MELLITUS WITH HYPOGLYCEMIA WITHOUT COMA, WITH LONG-TERM CURRENT USE OF INSULIN: ICD-10-CM
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
## 2024-02-27 DIAGNOSIS — J45.40 MODERATE PERSISTENT ASTHMA WITHOUT COMPLICATION: ICD-10-CM

## 2024-02-27 DIAGNOSIS — Z01.818 PREOP EXAMINATION: Primary | ICD-10-CM

## 2024-02-27 DIAGNOSIS — R07.81 RIB PAIN ON RIGHT SIDE: ICD-10-CM

## 2024-02-27 DIAGNOSIS — J44.9 CHRONIC OBSTRUCTIVE PULMONARY DISEASE, UNSPECIFIED COPD TYPE: ICD-10-CM

## 2024-02-27 DIAGNOSIS — E78.5 HYPERLIPIDEMIA ASSOCIATED WITH TYPE 2 DIABETES MELLITUS: ICD-10-CM

## 2024-02-27 PROBLEM — J96.21 ACUTE ON CHRONIC RESPIRATORY FAILURE WITH HYPOXIA: Status: RESOLVED | Noted: 2022-07-31 | Resolved: 2024-02-27

## 2024-02-27 PROCEDURE — 99999 PR PBB SHADOW E&M-EST. PATIENT-LVL V: CPT | Mod: PBBFAC,,, | Performed by: PHYSICIAN ASSISTANT

## 2024-02-27 PROCEDURE — 1126F AMNT PAIN NOTED NONE PRSNT: CPT | Mod: CPTII,S$GLB,, | Performed by: PHYSICIAN ASSISTANT

## 2024-02-27 PROCEDURE — 99214 OFFICE O/P EST MOD 30 MIN: CPT | Mod: S$GLB,,, | Performed by: PHYSICIAN ASSISTANT

## 2024-02-27 PROCEDURE — 1101F PT FALLS ASSESS-DOCD LE1/YR: CPT | Mod: CPTII,S$GLB,, | Performed by: PHYSICIAN ASSISTANT

## 2024-02-27 PROCEDURE — 3074F SYST BP LT 130 MM HG: CPT | Mod: CPTII,S$GLB,, | Performed by: PHYSICIAN ASSISTANT

## 2024-02-27 PROCEDURE — 3288F FALL RISK ASSESSMENT DOCD: CPT | Mod: CPTII,S$GLB,, | Performed by: PHYSICIAN ASSISTANT

## 2024-02-27 PROCEDURE — 3008F BODY MASS INDEX DOCD: CPT | Mod: CPTII,S$GLB,, | Performed by: PHYSICIAN ASSISTANT

## 2024-02-27 PROCEDURE — 3078F DIAST BP <80 MM HG: CPT | Mod: CPTII,S$GLB,, | Performed by: PHYSICIAN ASSISTANT

## 2024-02-27 NOTE — TELEPHONE ENCOUNTER
----- Message from Val Infante MA sent at 2/27/2024 11:13 AM CST -----  Regarding: Pre-op Clearance  Please reach out to pt to schedule appointment. Per provider: Pt is having hypoglycemia and needs to be seen before surgery. Please contact pt.   Thanks in advance.

## 2024-02-27 NOTE — TELEPHONE ENCOUNTER
Called patient, scheduled a follow up with Lia on 2/28/24 for an audio visit. Patient is no longer using dexcom, only doing fingersticks. His low reading was 40 on Thursday, has been running 120-190 since then and even before that day. Has a surgery coming up, but not scheduled yet.

## 2024-02-27 NOTE — TELEPHONE ENCOUNTER
----- Message from Val Infante MA sent at 2/27/2024 11:16 AM CST -----  Regarding: Pre-op  Good morning,    Please reach out to pt to schedule pre-op clearance.

## 2024-02-27 NOTE — PROGRESS NOTES
Subjective:      Patient ID: Crow Green is a 68 y.o. male.    Chief Complaint: Pre-op Exam (Right shoulder)    HPI  The patient is here for a preop clearance to have right shoulder reverse total shoulder arthroplasty   The physician that is performing the surgery is Dr. Alvarado.     The surgery is not yet scheduled  I will send a copy of the referral to the surgeon   The patient states that there has not been previous problems with sedation.    Kidney disease, stroke, MI, arrhythmia, seizures, angina, asthma, diabetes, chest pain, thyroid disease, blood/bleeding disorders?    Recent fall. Fell forward onto the tub. His the right side of his ribs. No difficulty breathing but ribs hurt when he takes a deep breath. Pt states that his glucose was 40 at the time of the fall.   Usually hypoglycemia in the mornings. A few times a month. Admits to skipping meals.     Patient Active Problem List   Diagnosis    ED (erectile dysfunction)    Non-proliferative diabetic retinopathy, mild, both eyes    Essential hypertension    Diabetic polyneuropathy    Nonobstructive coronary artery disease of native artery of native heart    Chronic combined systolic and diastolic heart failure    Type 2 diabetes mellitus    SANDRITA (obstructive sleep apnea)    Allergic asthma    Psychophysiological insomnia    Nightmares    Sleep related gastroesophageal reflux disease    Inadequate sleep hygiene    Chest pain    PAF (paroxysmal atrial fibrillation)    At risk for medication noncompliance    Obesity (BMI 30.0-34.9)    Indeterminate stage chronic open angle glaucoma    Right hip pain    Gait instability    Chronic right shoulder pain    Arthritis    Left sided sciatica    Osteoarthritis of spine with radiculopathy, lumbar region    HNP (herniated nucleus pulposus), lumbar    Gastroesophageal reflux disease without esophagitis    Hypogonadism in male    Disorder of parathyroid gland    Hyperlipidemia associated with type 2 diabetes  mellitus    Traumatic tear of left rotator cuff    Other chronic pain    Left shoulder pain    Complete tear of left rotator cuff    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 associated with type 2 diabetes mellitus    Lumbar radiculopathy    Diabetic ulcer of toe of left foot associated with type 2 diabetes mellitus, with necrosis of muscle    Ulcer of left foot    Elevated troponin    Hypotension due to medication    Dermatomyositis    Postprocedural hypotension    Advanced directives, counseling/discussion    Schizoaffective disorder, depressive type    Chronic obstructive pulmonary disease    MDD (major depressive disorder), recurrent episode, moderate    DOMINIK (generalized anxiety disorder)    Bilateral carpal tunnel syndrome    Rotator cuff tear arthropathy of left shoulder    Sacroiliac dysfunction    Atherosclerosis of native coronary artery of native heart with unstable angina pectoris    Coronary vasospasm    Cocaine abuse    Osteopenia    Atherosclerosis of aorta    Cardiac asthma    Chronic gout of multiple sites    Acquired absence of left great toe    Hypoxia    Cardiomyopathy    Bilateral carotid artery stenosis    Substance abuse(UDS + for cocaine )    Immunocompromised patient    Other reduced mobility    Abnormality of gait and mobility    Dependence on supplemental oxygen    Positive depression screening    Pulmonary hypertension- echo 7/2022    NICM (nonischemic cardiomyopathy)    Elevated IgE level    Moderate persistent asthma without complication    Elevated coronary artery calcium score     Past Surgical History:   Procedure Laterality Date    ABLATION OF ARRHYTHMOGENIC FOCUS FOR ATRIAL FIBRILLATION N/A 2/27/2020    Procedure: Ablation atrial fibrillation;  Surgeon: Gio Brown MD;  Location: [facility] EP LAB;  Service: Cardiology;  Laterality: N/A;  afib, DAMIEN (cx if SR), PVI, PITO, anes, MB, 3 Prep    CHOLECYSTECTOMY      CLOSURE OF WOUND Left 7/1/2020     Procedure: CLOSURE, WOUND;  Surgeon: Barb Veras DPM;  Location: [facility] OR;  Service: Podiatry;  Laterality: Left;    COLONOSCOPY N/A 3/4/2022    Procedure: COLONOSCOPY;  Surgeon: Yolis Freitas MD;  Location: [facility] ENDO;  Service: Endoscopy;  Laterality: N/A;    ESOPHAGOGASTRODUODENOSCOPY N/A 3/4/2022    Procedure: EGD (ESOPHAGOGASTRODUODENOSCOPY);  Surgeon: Yolis Freitas MD;  Location: [facility] ENDO;  Service: Endoscopy;  Laterality: N/A;    INJECTION OF ANESTHETIC AGENT INTO SACROILIAC JOINT Left 3/24/2021    Procedure: Left BLOCK, SACROILIAC JOINT and bilateral rhomboid TPI;  Surgeon: Dillan Tsai MD;  Location: [facility] PAIN MGT;  Service: Pain Management;  Laterality: Left;    INTRALUMINAL GASTROINTESTINAL TRACT IMAGING VIA CAPSULE N/A 3/22/2022    Procedure: IMAGING PROCEDURE, GI TRACT, INTRALUMINAL, VIA CAPSULE;  Surgeon: Justice Martinez RN;  Location: [facility] ENDO;  Service: Endoscopy;  Laterality: N/A;    REVERSE TOTAL SHOULDER ARTHROPLASTY Left 1/10/2022    Procedure: ARTHROPLASTY, SHOULDER, TOTAL, REVERSE;  Surgeon: Anthony Alvarado MD;  Location: [facility] OR;  Service: Orthopedics;  Laterality: Left;  left reverse total shoulder arthroplasty     SELECTIVE INJECTION OF ANESTHETIC AGENT AROUND LUMBAR SPINAL NERVE ROOT BY TRANSFORAMINAL APPROACH Left 6/11/2020    Procedure: BLOCK, SPINAL NERVE ROOT, LUMBAR, SELECTIVE, TRANSFORAMINAL APPROACH Left L4-5, L5-S1 TFESI with RN IV sedation;  Surgeon: Dillan Tsai MD;  Location: [facility] PAIN MGT;  Service: Pain Management;  Laterality: Left;    TOE AMPUTATION Left 6/29/2020    Procedure: AMPUTATION, TOE;  Surgeon: Barb Veras DPM;  Location: [facility] OR;  Service: Podiatry;  Laterality: Left;  great toe       Patient Active Problem List   Diagnosis    ED (erectile dysfunction)    Non-proliferative diabetic retinopathy, mild, both eyes    Essential hypertension    Diabetic polyneuropathy    Nonobstructive coronary artery disease of native artery of native heart     Chronic combined systolic and diastolic heart failure    Type 2 diabetes mellitus    SANDRITA (obstructive sleep apnea)    Allergic asthma    Psychophysiological insomnia    Nightmares    Sleep related gastroesophageal reflux disease    Inadequate sleep hygiene    Chest pain    PAF (paroxysmal atrial fibrillation)    At risk for medication noncompliance    Obesity (BMI 30.0-34.9)    Indeterminate stage chronic open angle glaucoma    Right hip pain    Gait instability    Chronic right shoulder pain    Arthritis    Left sided sciatica    Osteoarthritis of spine with radiculopathy, lumbar region    HNP (herniated nucleus pulposus), lumbar    Gastroesophageal reflux disease without esophagitis    Hypogonadism in male    Disorder of parathyroid gland    Hyperlipidemia associated with type 2 diabetes mellitus    Traumatic tear of left rotator cuff    Other chronic pain    Left shoulder pain    Complete tear of left rotator cuff    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 associated with type 2 diabetes mellitus    Lumbar radiculopathy    Diabetic ulcer of toe of left foot associated with type 2 diabetes mellitus, with necrosis of muscle    Ulcer of left foot    Elevated troponin    Hypotension due to medication    Dermatomyositis    Postprocedural hypotension    Advanced directives, counseling/discussion    Schizoaffective disorder, depressive type    Chronic obstructive pulmonary disease    MDD (major depressive disorder), recurrent episode, moderate    DOMINIK (generalized anxiety disorder)    Bilateral carpal tunnel syndrome    Rotator cuff tear arthropathy of left shoulder    Sacroiliac dysfunction    Atherosclerosis of native coronary artery of native heart with unstable angina pectoris    Coronary vasospasm    Cocaine abuse    Osteopenia    Atherosclerosis of aorta    Cardiac asthma    Chronic gout of multiple sites    Acquired absence of left great toe    Hypoxia    Cardiomyopathy     Bilateral carotid artery stenosis    Substance abuse(UDS + for cocaine )    Immunocompromised patient    Other reduced mobility    Abnormality of gait and mobility    Dependence on supplemental oxygen    Positive depression screening    Pulmonary hypertension- echo 7/2022    NICM (nonischemic cardiomyopathy)    Elevated IgE level    Moderate persistent asthma without complication    Elevated coronary artery calcium score         Current Outpatient Medications:     albuterol (PROVENTIL) 2.5 mg /3 mL (0.083 %) nebulizer solution, INHALE THE CONTENTS OF 1 VIAL VIA NEBULIZER EVERY 4 TO 6 HOURS AS NEEDED FOR WHEEZING OR FOR SHORTNESS OF BREATH, Disp: 1080 mL, Rfl: 3    allopurinoL (ZYLOPRIM) 100 MG tablet, Take 1 tablet (100 mg total) by mouth once daily., Disp: 90 tablet, Rfl: 3    amLODIPine (NORVASC) 2.5 MG tablet, Take 1 tablet (2.5 mg total) by mouth once daily., Disp: 90 tablet, Rfl: 3    apixaban (ELIQUIS) 5 mg Tab, TAKE 1 TABLET TWICE DAILY, Disp: 180 tablet, Rfl: 2    atorvastatin (LIPITOR) 40 MG tablet, Take 1 tablet (40 mg total) by mouth every evening., Disp: 90 tablet, Rfl: 3    blood sugar diagnostic (TRUE METRIX GLUCOSE TEST STRIP) Strp, Inject 1 each into the skin 4 (four) times daily., Disp: 150 strip, Rfl: 1    blood-glucose meter Misc, Use as instructed to test blood glucose meter daily., Disp: 1 each, Rfl: 0    brinzolamide-brimonidine (SIMBRINZA) 1-0.2 % DrpS, Place 1 drop into both eyes 3 (three) times daily., Disp: 8 mL, Rfl: 6    cyanocobalamin (VITAMIN B-12) 1000 MCG tablet, Take 1 tablet by mouth once daily., Disp: , Rfl:     empagliflozin (JARDIANCE) 25 mg tablet, Take 1 tablet (25 mg total) by mouth once daily., Disp: 90 tablet, Rfl: 3    EPINEPHrine (EPIPEN) 0.3 mg/0.3 mL AtIn, Inject 0.3 mLs (0.3 mg total) into the muscle once. for 1 dose, Disp: 2 each, Rfl: 3    fluticasone-umeclidin-vilanter (TRELEGY ELLIPTA) 200-62.5-25 mcg inhaler, Inhale 1 puff into the lungs once daily., Disp: 360  each, Rfl: 5    furosemide (LASIX) 40 MG tablet, Take 2 tablets (80 mg total) by mouth 2 (two) times a day., Disp: 120 tablet, Rfl: 10    gabapentin (NEURONTIN) 800 MG tablet, Take 1 tablet (800 mg total) by mouth 3 (three) times daily., Disp: 270 tablet, Rfl: 4    insulin aspart U-100 (NOVOLOG FLEXPEN U-100 INSULIN) 100 unit/mL (3 mL) InPn pen, INJECT 10 UNITS UNDER THE SKIN THREE TIMES DAILY WITH MEALS, Disp: 15 mL, Rfl: 10    INV allopurinol tablet, Take 1 tablet by mouth once daily., Disp: , Rfl:     lancets (TRUEPLUS LANCETS) 33 gauge Misc, Use to check blood sugar twice a day, Disp: 100 each, Rfl: 6    lancets Misc, 1 each by Misc.(Non-Drug; Combo Route) route 4 (four) times daily. Insurance preferred, Disp: 200 each, Rfl: 0    latanoprost 0.005 % ophthalmic solution, INSTILL 1 DROP INTO BOTH EYES ONE TIME DAILY BOTTLE EXPIRES 42 DAYS AFTER OPENING..., Disp: 7.5 mL, Rfl: 3    lisinopriL (PRINIVIL,ZESTRIL) 30 MG tablet, TAKE 1 TABLET EVERY DAY., Disp: 90 tablet, Rfl: 3    lisinopriL 10 MG tablet, TAKE 1 TABLET EVERY DAY, Disp: 90 tablet, Rfl: 3    multivitamin-iron-folic acid (SENTRY) Tab, Take 1 tablet by mouth once daily., Disp: , Rfl:     naproxen (NAPROSYN) 500 MG tablet, Take 1 tablet (500 mg total) by mouth 2 (two) times daily with meals., Disp: 20 tablet, Rfl: 0    omalizumab (XOLAIR) 150 mg/mL injection, Inject 2 mLs (300 mg total) into the skin every 14 (fourteen) days., Disp: 4 each, Rfl: 11    omalizumab (XOLAIR) 75 mg/0.5 mL injection, Inject 0.5 mLs (75 mg total) into the skin every 14 (fourteen) days., Disp: 2 each, Rfl: 11    omeprazole (PRILOSEC) 20 MG capsule, Take 20 mg by mouth every morning., Disp: , Rfl:     pantoprazole (PROTONIX) 40 MG tablet, Take 1 tablet (40 mg total) by mouth once daily., Disp: 90 tablet, Rfl: 1    papaverine-phentolamin-alprost 150 mg-5 mg- 50 mcg SolR, 0.5 mLs by Intracavernosal route as needed (ED)., Disp: 10 each, Rfl: ml    pen needle, diabetic (BD ULTRA-FINE  "SHORT PEN NEEDLE) 31 gauge x 5/16" Ndle, 1 each by Misc.(Non-Drug; Combo Route) route 3 (three) times daily., Disp: 300 each, Rfl: 3    pep injection, Inject 0.15 ml as directed   For compounding pharmacy use:  Add PAPAVERINE 30 mg Add PHENTOLAMINE 1 mg Add ALPROSTADIL 20 mcg, Disp: 1 vial, Rfl: 5    predniSONE (DELTASONE) 50 MG Tab, Take 50 mg by mouth., Disp: , Rfl:     ranolazine (RANEXA) 1,000 mg Tb12, TAKE 1 TABLET TWICE DAILY, Disp: 180 tablet, Rfl: 2    semaglutide (OZEMPIC) 2 mg/dose (8 mg/3 mL) PnIj, Inject 2 mg into the skin every 7 days., Disp: 9 mL, Rfl: 3    sertraline (ZOLOFT) 100 MG tablet, Take 1 tablet (100 mg total) by mouth every evening., Disp: 90 tablet, Rfl: 4    tamsulosin (FLOMAX) 0.4 mg Cap, Take 1 capsule (0.4 mg total) by mouth once daily., Disp: 90 capsule, Rfl: 1    traMADoL (ULTRAM) 50 mg tablet, Take 50 mg by mouth., Disp: , Rfl:     traZODone (DESYREL) 100 MG tablet, TAKE 2 TABLETS EVERY EVENING, Disp: 180 tablet, Rfl: 3    VIOS AEROSOL DELIVERY SYSTEM Shefali, USE AS DIRESTED, Disp: , Rfl: 0    Review of Systems   Constitutional:  Negative for activity change, appetite change, chills, diaphoresis, fatigue, fever and unexpected weight change.   HENT: Negative.  Negative for congestion, hearing loss, postnasal drip, rhinorrhea, sore throat, trouble swallowing and voice change.    Eyes: Negative.  Negative for visual disturbance.   Respiratory: Negative.  Negative for cough, choking, chest tightness and shortness of breath.    Cardiovascular:  Positive for chest pain. Negative for palpitations and leg swelling.   Gastrointestinal:  Negative for abdominal distention, abdominal pain, blood in stool, constipation, diarrhea, nausea and vomiting.   Endocrine: Negative for cold intolerance, heat intolerance, polydipsia and polyuria.   Genitourinary: Negative.  Negative for difficulty urinating and frequency.   Musculoskeletal:  Positive for arthralgias. Negative for back pain, gait problem, " joint swelling and myalgias.   Skin:  Negative for color change, pallor, rash and wound.   Neurological:  Negative for dizziness, tremors, weakness, light-headedness, numbness and headaches.   Hematological:  Negative for adenopathy.   Psychiatric/Behavioral:  Negative for behavioral problems, confusion, self-injury, sleep disturbance and suicidal ideas. The patient is not nervous/anxious.      Objective:   /76 (BP Location: Right arm, Patient Position: Sitting, BP Method: Medium (Manual))   Pulse 71   Temp 97.5 °F (36.4 °C) (Tympanic)   Resp 18   Wt 87.3 kg (192 lb 7.4 oz)   SpO2 97%   BMI 32.03 kg/m²     Physical Exam  Vitals reviewed.   Constitutional:       General: He is not in acute distress.     Appearance: Normal appearance. He is well-developed. He is not ill-appearing, toxic-appearing or diaphoretic.   HENT:      Head: Normocephalic and atraumatic.      Right Ear: External ear normal.      Left Ear: External ear normal.      Nose: Nose normal.   Eyes:      Conjunctiva/sclera: Conjunctivae normal.      Pupils: Pupils are equal, round, and reactive to light.   Cardiovascular:      Rate and Rhythm: Normal rate and regular rhythm.      Heart sounds: Normal heart sounds. No murmur heard.     No friction rub. No gallop.   Pulmonary:      Effort: Pulmonary effort is normal. No respiratory distress.      Breath sounds: Normal breath sounds. No wheezing or rales.   Chest:      Chest wall: Tenderness present.       Abdominal:      General: There is no distension.      Palpations: Abdomen is soft.      Tenderness: There is no abdominal tenderness.   Musculoskeletal:         General: Normal range of motion.      Cervical back: Normal range of motion and neck supple.   Lymphadenopathy:      Cervical: No cervical adenopathy.   Skin:     General: Skin is warm and dry.      Capillary Refill: Capillary refill takes less than 2 seconds.      Findings: No rash.   Neurological:      Mental Status: He is alert and  oriented to person, place, and time.      Motor: No weakness.      Coordination: Coordination normal.      Gait: Gait normal.   Psychiatric:         Mood and Affect: Mood normal.         Behavior: Behavior normal.         Thought Content: Thought content normal.         Judgment: Judgment normal.       Lab Results   Component Value Date    HGBA1C 6.4 (H) 09/29/2023     No visits with results within 1 Month(s) from this visit.   Latest known visit with results is:   Lab Visit on 01/29/2024   Component Date Value Ref Range Status    Sed Rate 01/29/2024 92 (H)  0 - 23 mm/Hr Final     Results for orders placed or performed during the hospital encounter of 10/06/23   EKG 12-lead    Collection Time: 10/06/23 10:12 AM    Narrative    Test Reason : R07.9,    Vent. Rate : 083 BPM     Atrial Rate : 083 BPM     P-R Int : 238 ms          QRS Dur : 112 ms      QT Int : 398 ms       P-R-T Axes : 080 -73 112 degrees     QTc Int : 467 ms    Sinus rhythm with 1st degree A-V block with Premature atrial complexes  Left anterior fascicular block  Possible Anterior infarct ,age undetermined  ST and T wave abnormality, consider lateral ischemia  Abnormal ECG  When compared with ECG of 31-MAR-2023 03:53,  Premature atrial complexes are now Present  Right bundle branch block is no longer Present  Borderline criteria for Anterior infarct are now Present  Confirmed by MD ENRIQUE, MERA ([id number]) on 10/8/2023 9:48:40 PM    Referred By: KHADAR WALTON           Confirmed By:MERA NUNZE MD      Assessment:     1. Preop examination    2. Essential hypertension    3. PAF (paroxysmal atrial fibrillation)    4. Chronic combined systolic and diastolic heart failure    5. Type 2 diabetes mellitus with mild nonproliferative retinopathy and macular edema, with long-term current use of insulin, unspecified laterality    6. SANDRITA (obstructive sleep apnea)    7. Moderate persistent extrinsic asthma without complication    8. Hyperlipidemia associated with type 2  diabetes mellitus    9. Chronic obstructive pulmonary disease, unspecified COPD type    10. Dependence on supplemental oxygen    11. Moderate persistent asthma without complication    12. Bilateral carotid artery stenosis    13. On anticoagulant therapy    14. Rib pain on right side    15. Type 2 diabetes mellitus with hypoglycemia without coma, with long-term current use of insulin    16. Fall, initial encounter      Plan:   Preop examination  -     CBC Auto Differential; Future; Expected date: 02/27/2024  -     Comprehensive Metabolic Panel; Future; Expected date: 02/27/2024  -     EKG 12-lead; Future  -     X-Ray Chest PA And Lateral; Future; Expected date: 02/27/2024  -     APTT; Future; Expected date: 02/27/2024  -     Protime-INR; Future; Expected date: 02/27/2024  -     Urinalysis; Future; Expected date: 02/27/2024  -     Ambulatory referral/consult to Cardiology  -     Ambulatory referral/consult to Pulmonology; Future; Expected date: 03/05/2024  -     Ambulatory referral/consult to Diabetes Education; Future; Expected date: 03/05/2024  -     Ambulatory referral/consult to Diabetic Advanced Practice Providers (Medical Management); Future; Expected date: 03/05/2024    Essential hypertension    PAF (paroxysmal atrial fibrillation)  -     APTT; Future; Expected date: 02/27/2024  -     Protime-INR; Future; Expected date: 02/27/2024  -     Ambulatory referral/consult to Cardiology    Chronic combined systolic and diastolic heart failure    Type 2 diabetes mellitus with mild nonproliferative retinopathy and macular edema, with long-term current use of insulin, unspecified laterality    SANDRITA (obstructive sleep apnea)    Moderate persistent extrinsic asthma without complication    Hyperlipidemia associated with type 2 diabetes mellitus    Chronic obstructive pulmonary disease, unspecified COPD type  -     Ambulatory referral/consult to Pulmonology; Future; Expected date: 03/05/2024    Dependence on supplemental  oxygen  -     Ambulatory referral/consult to Pulmonology; Future; Expected date: 03/05/2024    Moderate persistent asthma without complication    Bilateral carotid artery stenosis    On anticoagulant therapy  -     APTT; Future; Expected date: 02/27/2024  -     Protime-INR; Future; Expected date: 02/27/2024  -     Ambulatory referral/consult to Cardiology    Rib pain on right side  -     X-Ray Chest PA And Lateral; Future; Expected date: 02/27/2024  -     X-Ray Ribs 2 View Right; Future; Expected date: 02/27/2024    Type 2 diabetes mellitus with hypoglycemia without coma, with long-term current use of insulin  -     Ambulatory referral/consult to Diabetes Education; Future; Expected date: 03/05/2024  -     Ambulatory referral/consult to Diabetic Advanced Practice Providers (Medical Management); Future; Expected date: 03/05/2024    Fall, initial encounter  -     X-Ray Ribs 2 View Right; Future; Expected date: 02/27/2024    -needs clearance from pulm and cardiology  -follow up with diabetes education and DARSHANA for hypoglycemia. Advised to check sugar before administering insulin. Always take with food. Avoid skipping meals.   -I find. Mr Green at moderate risk for surgery    Follow up if symptoms worsen or fail to improve.

## 2024-02-28 ENCOUNTER — OFFICE VISIT (OUTPATIENT)
Dept: DIABETES | Facility: CLINIC | Age: 69
End: 2024-02-28
Payer: MEDICARE

## 2024-02-28 DIAGNOSIS — N18.30 CKD STAGE 3 DUE TO TYPE 2 DIABETES MELLITUS: ICD-10-CM

## 2024-02-28 DIAGNOSIS — I25.118 CORONARY ARTERY DISEASE OF NATIVE ARTERY OF NATIVE HEART WITH STABLE ANGINA PECTORIS: ICD-10-CM

## 2024-02-28 DIAGNOSIS — E11.3312 MODERATE NONPROLIFERATIVE DIABETIC RETINOPATHY OF LEFT EYE WITH MACULAR EDEMA ASSOCIATED WITH TYPE 2 DIABETES MELLITUS: ICD-10-CM

## 2024-02-28 DIAGNOSIS — E11.59 HYPERTENSION ASSOCIATED WITH DIABETES: ICD-10-CM

## 2024-02-28 DIAGNOSIS — E11.69 HYPERLIPIDEMIA ASSOCIATED WITH TYPE 2 DIABETES MELLITUS: ICD-10-CM

## 2024-02-28 DIAGNOSIS — E11.65 UNCONTROLLED TYPE 2 DIABETES MELLITUS WITH HYPERGLYCEMIA, WITH LONG-TERM CURRENT USE OF INSULIN: Primary | ICD-10-CM

## 2024-02-28 DIAGNOSIS — I50.32 CHRONIC DIASTOLIC HEART FAILURE: ICD-10-CM

## 2024-02-28 DIAGNOSIS — E11.42 DIABETIC POLYNEUROPATHY ASSOCIATED WITH TYPE 2 DIABETES MELLITUS: ICD-10-CM

## 2024-02-28 DIAGNOSIS — Z79.4 UNCONTROLLED TYPE 2 DIABETES MELLITUS WITH HYPERGLYCEMIA, WITH LONG-TERM CURRENT USE OF INSULIN: Primary | ICD-10-CM

## 2024-02-28 DIAGNOSIS — E78.5 HYPERLIPIDEMIA ASSOCIATED WITH TYPE 2 DIABETES MELLITUS: ICD-10-CM

## 2024-02-28 DIAGNOSIS — E11.22 CKD STAGE 3 DUE TO TYPE 2 DIABETES MELLITUS: ICD-10-CM

## 2024-02-28 DIAGNOSIS — I15.2 HYPERTENSION ASSOCIATED WITH DIABETES: ICD-10-CM

## 2024-02-28 PROCEDURE — 1160F RVW MEDS BY RX/DR IN RCRD: CPT | Mod: CPTII,95,, | Performed by: NURSE PRACTITIONER

## 2024-02-28 PROCEDURE — 1159F MED LIST DOCD IN RCRD: CPT | Mod: CPTII,95,, | Performed by: NURSE PRACTITIONER

## 2024-02-28 PROCEDURE — 99442 PR PHYSICIAN TELEPHONE EVALUATION 11-20 MIN: CPT | Mod: 95,,, | Performed by: NURSE PRACTITIONER

## 2024-02-28 RX ORDER — BLOOD-GLUCOSE SENSOR
EACH MISCELLANEOUS
Qty: 3 EACH | Refills: 11 | Status: SHIPPED | OUTPATIENT
Start: 2024-02-28

## 2024-02-28 NOTE — PROGRESS NOTES
Established Patient - Audio Only Telehealth Visit     The patient location is: [place]  The chief complaint leading to consultation is: sooner visit to discuss hypoglycemia  Visit type: Virtual visit with audio only (telephone)  Total time spent with patient: 12 minutes       The reason for the audio only service rather than synchronous audio and video virtual visit was related to technical difficulties or patient preference/necessity.     Each patient to whom I provide medical services by telemedicine is:  (1) informed of the relationship between the physician and patient and the respective role of any other health care provider with respect to management of the patient; and (2) notified that they may decline to receive medical services by telemedicine and may withdraw from such care at any time. Patient verbally consented to receive this service via voice-only telephone call.       HPI:     Sooner appt with me due to hypoglycemia.   Last visit with Eduardo EspinozaKnight: 10/6/2023    Patient has issues with transportation.   Patient has had Type II diabetes since 2000.  Pertinent to decision making is the following comorbidities: HTN, HLD, CAD, CHF, CKD III, Obesity by BMI, ED and Parathryoid disorder and Hypogonadism   Patient has the following Diabetes complications: with diabetic chronic kidney disease, with diabetic polyneuropathy and with diabetic retinopathy; mild non-proliferative; without macular edema  He has attended diabetes education in the past.     Patient has failed the following Diabetes medications:   Bydureon  Metformin - GI  Glimepiride    No longer using Dexcom - states had a recent low blood sugar of 30 recently. He fell while trying to get out of the bathtub due to dizziness. He has been skipping meals, but adamant he has not used any Novolog for 4 days prior to this low blood sugar event. Ultimately, may need to lower Ozempic as this dose may be too strong if he continues to skip meals. Recommended  "consistent meals with protein + carb.     He has been off Dexcom for about 4 months- states his phone is not compatible. We discussed G7 and communication with a  and that I [facility]. We discussed with his current insurance, he can now use local pharmacy.     He already has labs and an appt set up with cardiology for Fri - will add A1c to this set of labs, and he will  a G7  sample then. He understands to bring a G7 sensor and his  to appt already scheduled with DM education next week.     Diabetes Medications               empagliflozin (JARDIANCE) 25 mg tablet Take 1 tablet (25 mg total) by mouth once daily.    insulin aspart U-100 (NOVOLOG FLEXPEN U-100 INSULIN) 100 unit/mL (3 mL) InPn pen INJECT 10 UNITS UNDER THE SKIN THREE TIMES DAILY WITH MEALS  Not taking set doses: Taking per SS but states he has not been taking recently.  If  - 250, may take 2 units of Novolog  If  - 300, may take 3 units of Novolog  If  - 350, may take 4 units of Novolog  If  - 400, may take 5 units of Novolog  If +, may take 6 units of Novolog    semaglutide (OZEMPIC) 2 mg/dose (8 mg/3 mL) PnIj Inject 2 mg into the skin every 7 days.           Diabetes Management Status    Statin: Taking  ACE/ARB: Taking    Screening or Prevention Patient's value Goal Complete/Controlled?   HgA1C Testing and Control   Lab Results   Component Value Date    HGBA1C 6.4 (H) 09/29/2023      Annually/Less than 8% Yes   Lipid profile : 07/08/2023 Annually No   LDL control Lab Results   Component Value Date    LDLCALC 62 07/08/2023    Annually/Less than 100 mg/dl  Yes   Nephropathy screening Lab Results   Component Value Date    LABMICR 426.0 09/29/2023     Lab Results   Component Value Date    PROTEINUA 2+ (A) 03/31/2023     No results found for: "UTPCR"   Annually Yes   Blood pressure BP Readings from Last 1 Encounters:   02/27/24 120/76    Less than 140/90 Yes "   Dilated retinal exam : 11/28/2023 Annually Yes   Foot exam   : 04/11/2023 Annually Yes         Assessment and plan:      Crow was seen today for diabetes mellitus.    Diagnoses and all orders for this visit:    Uncontrolled type 2 diabetes mellitus with hyperglycemia, with long-term current use of insulin  -     DEXCOM G7 SENSOR Shefali; Change sensor every 10 days  -     Hemoglobin A1C; Future    Chronic -    CURRENT DM MEDICATIONS:   Novolog correction dosing every 3 hours For now with recent low blood sugar, remain off of Novolog. Needs to return to Dexcom G7 ASAP with  since no longer able to use his phone (not compatible).  Jardiance 25 mg daily  Ozempic 2 mg weekly    Novolog Correction Dosing every 3 hours as needed OUTSIDE OF EATING  If  - 250, may take 2 units of Novolog  If  - 300, may take 3 units of Novolog  If  - 350, may take 4 units of Novolog  If  - 400, may take 5 units of Novolog  If +, may take 6 units of Novolog    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 associated with type 2 diabetes mellitus    Diabetic polyneuropathy associated with type 2 diabetes mellitus    Chronic diastolic heart failure    Coronary artery disease of native artery of native heart with stable angina pectoris    Hypertension associated with diabetes    Hyperlipidemia associated with type 2 diabetes mellitus    CKD stage 3 due to type 2 diabetes mellitus                              This service was not originating from a related E/M service provided within the previous 7 days nor will  to an E/M service or procedure within the next 24 hours or my soonest available appointment.  Prevailing standard of care was able to be met in this audio-only visit.

## 2024-02-28 NOTE — PATIENT INSTRUCTIONS
CURRENT DM MEDICATIONS:   Novolog correction dosing every 3 hours For now with recent low blood sugar, remain off of Novolog. Needs to return to [address] with  since no longer able to use his phone (not compatible).  Jardiance 25 mg daily  Ozempic 2 mg weekly    Novolog Correction Dosing every 3 hours as needed OUTSIDE OF EATING  If  - 250, may take 2 units of Novolog  If  - 300, may take 3 units of Novolog  If  - 350, may take 4 units of Novolog  If  - 400, may take 5 units of Novolog  If +, may take 6 units of Novolog

## 2024-02-28 NOTE — Clinical Note
"Please add A1c order to his labs already scheduled on Fri at the [facility]. Add to the note with Dana on 3/6 "help set up Dexcom G7 ""

## 2024-02-28 NOTE — Clinical Note
This is one of Eduardo's patients that I saw as an audio visit today (he has issues with transportation). Apparently he had a severe low and fell while trying to get out of the bathtub, even though he swears he had not taken any Novolog in 4 days before that. He doesn't eat regularly and has been off Dexcom as his phone is no longer compatible. We have a  sample for him and I saw he has an appt with you for next week - hoping we can get him back on Dex . Let me know if you get any further information out of him at your visit. Thanks! - Lia

## 2024-03-04 ENCOUNTER — TELEPHONE (OUTPATIENT)
Dept: CARDIOLOGY | Facility: CLINIC | Age: 69
End: 2024-03-04
Payer: MEDICARE

## 2024-03-04 NOTE — TELEPHONE ENCOUNTER
Spoke with pt in regards to rescheduling pre op appt.                     ----- Message from Angela Hogan sent at 3/4/2024  9:06 AM CST -----  Name of Who is Calling:pt           What is the request in detail:requesting a call to reschedule appts.           Can the clinic reply by [facility]:no           What Number [facility] Back [facility]: [phone]

## 2024-03-06 ENCOUNTER — CLINICAL SUPPORT (OUTPATIENT)
Dept: DIABETES | Facility: CLINIC | Age: 69
End: 2024-03-06
Payer: MEDICARE

## 2024-03-06 VITALS — BODY MASS INDEX: 31.84 KG/M2 | WEIGHT: 191.38 LBS

## 2024-03-06 DIAGNOSIS — E11.3219 TYPE 2 DIABETES MELLITUS WITH MILD NONPROLIFERATIVE RETINOPATHY AND MACULAR EDEMA, WITH LONG-TERM CURRENT USE OF INSULIN, UNSPECIFIED LATERALITY: Primary | ICD-10-CM

## 2024-03-06 DIAGNOSIS — E11.649 TYPE 2 DIABETES MELLITUS WITH HYPOGLYCEMIA WITHOUT COMA, WITH LONG-TERM CURRENT USE OF INSULIN: ICD-10-CM

## 2024-03-06 DIAGNOSIS — Z79.4 TYPE 2 DIABETES MELLITUS WITH MILD NONPROLIFERATIVE RETINOPATHY AND MACULAR EDEMA, WITH LONG-TERM CURRENT USE OF INSULIN, UNSPECIFIED LATERALITY: Primary | ICD-10-CM

## 2024-03-06 DIAGNOSIS — Z01.818 PREOP EXAMINATION: ICD-10-CM

## 2024-03-06 DIAGNOSIS — Z79.4 TYPE 2 DIABETES MELLITUS WITH HYPOGLYCEMIA WITHOUT COMA, WITH LONG-TERM CURRENT USE OF INSULIN: ICD-10-CM

## 2024-03-06 DIAGNOSIS — E11.65 UNCONTROLLED TYPE 2 DIABETES MELLITUS WITH HYPERGLYCEMIA: ICD-10-CM

## 2024-03-06 PROCEDURE — G0108 DIAB MANAGE TRN  PER INDIV: HCPCS | Mod: S$GLB,,, | Performed by: PEDIATRICS

## 2024-03-06 PROCEDURE — 99999 PR PBB SHADOW E&M-EST. PATIENT-LVL IV: CPT | Mod: PBBFAC,,,

## 2024-03-06 NOTE — PROGRESS NOTES
Diabetes Care Specialist Progress Note  Author: Dana Humphrey RN  Date: 3/6/2024    Program Intake  Reason for Diabetes Program Visit:: Intervention  Type of Intervention:: Individual  Individual: Device Training  Device Training: Personal CGM  Current diabetes risk level:: high  In the last 12 months, have you:: none  Permission to speak with others about care:: no  Continuous Glucose Monitoring  Patient has CGM: Yes  Personal CGM type:: Dexcom G7    Lab Results   Component Value Date    HGBA1C 6.4 (H) 09/29/2023     CURRENT MEDS  Jardiance 25 mg daily   Ozempic 2 mg weekly   Novolog (Not taking due to hypoglycemia)    Clinical    Weight: 86.8 kg (191 lb 5.8 oz)       Body mass index is 31.84 kg/m².    Patient Health Rating  Compared to other people your age, how would you rate your health?: Poor    Problem Review  Reviewed Problem List with Patient: yes  Active comorbidities affecting diabetes self-care.: yes  Comorbidities: Neuropathy, Hypertension, Cardiovascular Disease, Retinopathy, Heart Attack  Reviewed health maintenance: yes    Clinical Assessment  Current Diabetes Treatment: Oral Medication, Injectable, Insulin  Have you ever experienced hypoglycemia (low blood sugar)?: yes  In the last month, how often have you experienced low blood sugar?: once a week  Are you able to tell when your blood sugar is low?: No  Have you ever been hospitalized because your blood sugar was too low?:  (Ambulance called b/c severe low and fell in tub)  How do you treat hypoglycemia (low blood sugar)?: 1/2 can soda/fruit juice, 5-6 pieces of hard candy  Have you ever experienced hyperglycemia (high blood sugar)?: yes  In the last month, how often have you experienced high blood sugar?: once a day  Are you able to tell when your blood sugar is high?: Yes  What are your symptoms?: other (see comments), blurry vision (Headache)  Have you ever been hospitalized because your blood sugar was high?: no    Medication  Information  How do you obtain your medications?: Mail order  How many days a week do you miss your medications?: Never  Do you sometimes have difficulty refilling your medications?: No  Medication adherence impacting ability to self-manage diabetes?: No    Labs  Do you have regular lab work to monitor your medications?: Yes  Type of Regular Lab Work: A1c, Cholesterol, Microalbumin, CBC, BMP  Where do you get your labs drawn?: [facility]  Lab Compliance Barriers: No    Nutritional Status  Diet: Regular  Meal Plan 24 Hour Recall: Breakfast, Dinner, Lunch, Snack  Meal Plan 24 Hour Recall - Breakfast: None.l  Meal Plan 24 Hour Recall - Lunch: [place]; Water  Meal Plan 24 Hour Recall - Dinner: [place]; Water  Meal Plan 24 Hour Recall - Snack: None.  Change in appetite?: Yes (Decreased.)  Recent Changes in Weight: No Recent Weight Change  Current nutritional status an area of need that is impacting patient's ability to self-manage diabetes?: No    Additional Social History    Support  Does anyone support you with your diabetes care?: yes  Who supports you?: self  Who takes you to your medical appointments?: other (see comments) (Relies on transportation.)  Does the current support meet the patient's needs?: Yes  Is Support an area impacting ability to self-manage diabetes?: No    Access to Mass Media & Technology  Does the patient have access to any of the following devices or technologies?: Smart phone  Media or technology needs impacting ability to self-manage diabetes?: No    Cognitive/Behavioral Health  Alert and Oriented: Yes  Difficulty Thinking: No  Requires Prompting: No  Requires assistance for routine expression?: No  Cognitive or behavioral barriers impacting ability to self-manage diabetes?: No    Culture/Judaism  Culture or Islam beliefs that may impact ability to access healthcare: No    Communication  Language preference: English  Hearing Problems: No  Vision Problems: Yes  Vision problem type::  Decreased Vision  Vision Assistance: Glasses  Communication needs impacting ability to self-manage diabetes?: No    Health Literacy  Preferred Learning Method: Face to Face, Reading Materials, Demonstration, Hands On  How often do you need to have someone help you read instructions, pamphlets, or written material from your doctor or pharmacy?: Always (Home health was coming out to help)  Health literacy needs impacting ability to self-manage diabetes?: Yes      Diabetes Self-Management Skills Assessment    Diabetes Disease Process/Treatment Options  Area of need?: Deferred    Nutrition/Healthy Eating  Challenges to healthy eating:: other (see comments) (Not eating.)  Method of carbohydrate measurement:: no method  Patient can identify foods that impact blood sugar.: yes  Patient-identified foods:: starches (bread, pasta, rice, cereal)  Nutrition/Healthy Eating Skills Assessment Completed:: Yes  Assessment indicates:: Knowledge deficit  Area of need?: Yes    Physical Activity/Exercise  Area of need?: Deferred    Medications  Patient is able to describe current diabetes management routine.: yes  Diabetes management routine:: injectable medications, insulin, oral medications  Patient is able to identify current diabetes medications, dosages, and appropriate timing of medications.: yes  Patient understands the purpose of the medications taken for diabetes.: yes  Patient reports problems or concerns with current medication regimen.: no  Medication Skills Assessment Completed:: Yes  Assessment indicates:: Adequate understanding  Area of need?: No    Home Blood Glucose Monitoring  Patient states that blood sugar is checked at home daily.: no  Reasons for not monitoring:: needs training  Personal CGM type:: Dexcom G7  Home Blood Glucose Monitoring Skills Assessment Completed: : Yes  Assessment indicates:: Knowledge deficit, Instruction Needed  Area of need?: Yes    Acute Complications  Patient is able to identify types of  acute complications: No  Acute Complications Skills Assessment Completed: : Yes  Assessment indicates:: Knowledge deficit, Instruction Needed  Area of need?: Yes    Chronic Complications  Area of need?: Deferred    Psychosocial/Coping  Area of need?: Deferred      Assessment Summary and Plan    Based on today's diabetes care assessment, the following areas of need were identified:          3/6/2024    12:04 AM   Social   Support No   Access to Mass Media/Tech No   Cognitive/Behavioral Health No   Culture/Buddhist No   Communication No   Health Literacy Yes            3/6/2024    12:04 AM   Clinical   Medication Adherence No   Lab Compliance No   Nutritional Status No            3/6/2024    12:04 AM   Diabetes Self-Management Skills   Diabetes Disease Process/Treatment Options Deferred.     Nutrition/Healthy Eating Yes-Discussed eating more consistent meals so to prevent low blood sugars. He states he does not feel like cooking or he's just not hungry.      Physical Activity/Exercise Deferred.     Medication No-Taking medication as prescribed. Novolog is currently on hold due to hypoglycemia.      Home Blood Glucose Monitoring Yes-See care plan for Dexcom G7 training.      Acute Complications Yes-Had an episode of hypoglycemia which was discovered after falling in the bathtub. Firefighters arrived to find his blood sugar to be in the 40s. Discussed possible reasons why it could have dropped; he states he hadn't eaten in 2 days. Re-educated on the importance of consistent meal intake.     Chronic Complications Deferred.     Psychosocial/Coping Deferred.            Today's interventions were provided through individual discussion, instruction, and written materials were provided.      Patient verbalized understanding of instruction and written materials.  Pt was able to return back demonstration of instructions today. Patient understood key points, needs reinforcement and further instruction.     Diabetes  "Self-Management Care Plan:    Today's Diabetes Self-Management Care Plan was developed with Crow's input. Crow has agreed to work toward the following goal(s) to improve his/her overall diabetes control.      Care Plan: Diabetes Management   Updates made since 2/5/2024 12:00 AM        Problem: Blood Glucose Self-Monitoring         Goal: Patient agrees to monitor blood glucose using personal Dexcom G7.    Start Date: 3/6/2024   Expected End Date: 3/6/2025   Priority: High   Barriers: No Barriers Identified   Note:    Method: discussion, demonstration, and instruction  Level of Comprehension: demonstrates understanding/competency - verbalizes/demonstrates    DEXCOM G7 CGM TRAINING  Education was provided using "Quick Start Guide" and demo device per Dexcom protocol.     Patient will use Dexcom G7  to receive continuous glucose data.        Overview:  5 minute glucose reading updates, trending arrows, BG graph screens, reports screen,  connectivity and functions.   Menus: Trend graph, start sensor, enter BG, events, alerts, settings, replace sensor, stop sensor, and shutdown  Dexcom G7 mobile brittnee/ Settings:                          * Urgent Low: 55 mg/dL                          * Urgent Low Soon: On                          * Low Alert: 70 mg/dL                          * High Alert: 200 mg/dL                          * Falling Fast: On                          * Rising Fast: On                          * Signal Loss: On                          * Brief Sensor Issue: On     Reviewed additional setting options.  Patient paired sensor using 4-digit code listed on sensor cap..    Reviewed where to find sensor insertion time and expiration date.   Reviewed appropriate calibration techniques.  Reviewed sensor site selection. Patient selected and prepped site using aseptic technique, inserted sensor, applied overlay tape and started session.   Reviewed sensor removal from site. Discussed times " to remove sensor per  guidelines include MRI or diatherapy.   Patient able to demonstrate without difficulty. Encouraged to review manual and/or training videos prior to starting another sensor.   Reviewed problem solving aspects of sensor transmission/variables that can disrupt RF transmission.  range 20 feet, but the first 3 hrs keep within 3 feet of transmitter.  Patient instructed on lag time of interstitial fluid from CBG and was advised to treat hypoglycemia and dose insulin based on SMBG values.  Dexcom technical support contact number given and examples of when to contact them discussed.       Warm up completed: 81 & steady         Task: Reviewed advantages of having a personal CGM monitor. Completed 3/6/2024        Task: Discussed misconceptions of CGM monitors. Completed 3/6/2024        Task: Instructed on how often to change sensor. Completed 3/6/2024   Note:    Change sensor every 10 days.       Task: Discussed proper rotation of sensor sites. Completed 3/6/2024        Task: Discussed need for calibration if necessary. Completed 3/6/2024        Task: Directed to use directional arrows to make more informed decisions on managing glucose. Completed 3/6/2024        Task: Encouraged patient to bring their device to clinic visits. Completed 3/6/2024        Task: Discussed guidelines for preventing, detecting and treating hypoglycemia and hyperglycemia and reviewed the importance of meal and medication timing with diabetes mediations for prevention of hypoglycemia and maximum drug benefit. Completed 3/6/2024          Follow Up Plan     Follow up in about 2 weeks (around 3/20/2024) for completion of assessment and review of Dexcom.    Today's care plan and follow up schedule was discussed with patient.  Crow verbalized understanding of the care plan, goals, and agrees to follow up plan.        The patient was encouraged to communicate with his/her health care provider/physician and care  team regarding his/her condition(s) and treatment.  I provided the patient with my contact information today and encouraged to contact me via phone or [facility] Patient Portal as needed.     Length of Visit   Total Time: 60 Minutes

## 2024-03-07 ENCOUNTER — TELEPHONE (OUTPATIENT)
Dept: INTERNAL MEDICINE | Facility: CLINIC | Age: 69
End: 2024-03-07
Payer: MEDICARE

## 2024-03-07 ENCOUNTER — HOSPITAL ENCOUNTER (OUTPATIENT)
Dept: RADIOLOGY | Facility: HOSPITAL | Age: 69
Discharge: HOME | End: 2024-03-07
Attending: PHYSICIAN ASSISTANT
Payer: MEDICARE

## 2024-03-07 ENCOUNTER — HOSPITAL ENCOUNTER (OUTPATIENT)
Dept: CARDIOLOGY | Facility: HOSPITAL | Age: 69
Discharge: HOME | End: 2024-03-07
Attending: INTERNAL MEDICINE
Payer: MEDICARE

## 2024-03-07 VITALS
SYSTOLIC BLOOD PRESSURE: 149 MMHG | WEIGHT: 192 LBS | HEIGHT: 65 IN | BODY MASS INDEX: 31.99 KG/M2 | DIASTOLIC BLOOD PRESSURE: 69 MMHG

## 2024-03-07 VITALS
WEIGHT: 191 LBS | HEIGHT: 65 IN | DIASTOLIC BLOOD PRESSURE: 69 MMHG | SYSTOLIC BLOOD PRESSURE: 149 MMHG | BODY MASS INDEX: 31.82 KG/M2

## 2024-03-07 VITALS
DIASTOLIC BLOOD PRESSURE: 69 MMHG | BODY MASS INDEX: 31.99 KG/M2 | SYSTOLIC BLOOD PRESSURE: 149 MMHG | HEIGHT: 65 IN | WEIGHT: 192 LBS

## 2024-03-07 DIAGNOSIS — I65.23 BILATERAL CAROTID ARTERY STENOSIS: ICD-10-CM

## 2024-03-07 DIAGNOSIS — E11.65 UNCONTROLLED TYPE 2 DIABETES MELLITUS WITH HYPERGLYCEMIA: ICD-10-CM

## 2024-03-07 DIAGNOSIS — E11.621 DIABETIC ULCER OF TOE OF LEFT FOOT ASSOCIATED WITH TYPE 2 DIABETES MELLITUS, WITH NECROSIS OF MUSCLE: ICD-10-CM
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
## 2024-03-07 DIAGNOSIS — R93.1 ELEVATED CORONARY ARTERY CALCIUM SCORE: ICD-10-CM

## 2024-03-07 DIAGNOSIS — L97.523 DIABETIC ULCER OF TOE OF LEFT FOOT ASSOCIATED WITH TYPE 2 DIABETES MELLITUS, WITH NECROSIS OF MUSCLE: ICD-10-CM

## 2024-03-07 DIAGNOSIS — R07.81 RIB PAIN ON RIGHT SIDE: ICD-10-CM

## 2024-03-07 LAB
LEFT ABI: 1.71
LEFT ANT TIBIAL SYS PSV: 45 CM/S
LEFT ARM BP: 145 MMHG
LEFT ARM DIASTOLIC BLOOD PRESSURE: 72 MMHG
LEFT ARM SYSTOLIC BLOOD PRESSURE: 145 MMHG
LEFT CBA DIAS: 27 CM/S
LEFT CBA SYS: 144 CM/S
LEFT CCA DIST DIAS: 42 CM/S
LEFT CCA DIST SYS: 239 CM/S
LEFT CCA MID DIAS: 41 CM/S
LEFT CCA MID SYS: 181 CM/S
LEFT CCA PROX DIAS: 15 CM/S
LEFT CCA PROX SYS: 72 CM/S
LEFT CFA PSV: 98 CM/S
LEFT DORSALIS PEDIS: 255 MMHG
LEFT ECA DIAS: 9 CM/S
LEFT ECA SYS: 138 CM/S
LEFT ICA DIST DIAS: 51 CM/S
LEFT ICA DIST SYS: 149 CM/S
LEFT ICA MID DIAS: 39 CM/S
LEFT ICA MID SYS: 142 CM/S
LEFT ICA PROX DIAS: 32 CM/S
LEFT ICA PROX SYS: 173 CM/S
LEFT PERONEAL SYS PSV: 39 CM/S
LEFT POPLITEAL PSV: 83 CM/S
LEFT POST TIBIAL SYS PSV: 113 CM/S
LEFT POSTERIOR TIBIAL: 255 MMHG
LEFT PROFUNDA SYS PSV: 93 CM/S
LEFT SUPER FEMORAL DIST SYS PSV: 75 CM/S
LEFT SUPER FEMORAL MID SYS PSV: 121 CM/S
LEFT SUPER FEMORAL OSTIAL SYS PSV: 107 CM/S
LEFT SUPER FEMORAL PROX SYS PSV: 115 CM/S
LEFT TBI: 0.57
LEFT TIB/PER TRUNK SYS PSV: 61 CM/S
LEFT TOE PRESSURE: 85 MMHG
LEFT VERTEBRAL DIAS: 11 CM/S
LEFT VERTEBRAL SYS: 34 CM/S
OHS CV CAROTID RIGHT ICA EDV HIGHEST: 144
OHS CV CAROTID ULTRASOUND LEFT ICA/CCA RATIO: 0.72
OHS CV CAROTID ULTRASOUND RIGHT ICA/CCA RATIO: 6.06
OHS CV PV CAROTID LEFT HIGHEST CCA: 239
OHS CV PV CAROTID LEFT HIGHEST ICA: 173
OHS CV PV CAROTID RIGHT HIGHEST CCA: 80
OHS CV PV CAROTID RIGHT HIGHEST ICA: 430
OHS CV US CAROTID LEFT HIGHEST EDV: 51
RIGHT ABI: 1.71
RIGHT ANT TIBIAL SYS PSV: 44 CM/S
RIGHT ARM BP: 149 MMHG
RIGHT ARM DIASTOLIC BLOOD PRESSURE: 69 MMHG
RIGHT ARM SYSTOLIC BLOOD PRESSURE: 149 MMHG
RIGHT CBA DIAS: 18 CM/S
RIGHT CBA SYS: 76 CM/S
RIGHT CCA DIST DIAS: 21 CM/S
RIGHT CCA DIST SYS: 71 CM/S
RIGHT CCA MID DIAS: 15 CM/S
RIGHT CCA MID SYS: 80 CM/S
RIGHT CCA PROX DIAS: 11 CM/S
RIGHT CCA PROX SYS: 63 CM/S
RIGHT CFA PSV: 108 CM/S
RIGHT DORSALIS PEDIS: 255 MMHG
RIGHT ECA DIAS: 23 CM/S
RIGHT ECA SYS: 175 CM/S
RIGHT ICA DIST DIAS: 34 CM/S
RIGHT ICA DIST SYS: 137 CM/S
RIGHT ICA MID DIAS: 31 CM/S
RIGHT ICA MID SYS: 160 CM/S
RIGHT ICA PROX DIAS: 144 CM/S
RIGHT ICA PROX SYS: 430 CM/S
RIGHT PERONEAL SYS PSV: 68 CM/S
RIGHT POPLITEAL PSV: 80 CM/S
RIGHT POST TIBIAL SYS PSV: 70 CM/S
RIGHT POSTERIOR TIBIAL: 255 MMHG
RIGHT PROFUNDA SYS PSV: 84 CM/S
RIGHT SUPER FEMORAL DIST SYS PSV: 88 CM/S
RIGHT SUPER FEMORAL MID SYS PSV: 142 CM/S
RIGHT SUPER FEMORAL OSTIAL SYS PSV: 80 CM/S
RIGHT SUPER FEMORAL PROX SYS PSV: 83 CM/S
RIGHT TBI: 0.64
RIGHT TIB/PER TRUNK SYS PSV: 72 CM/S
RIGHT TOE PRESSURE: 95 MMHG
RIGHT VERTEBRAL DIAS: 9 CM/S
RIGHT VERTEBRAL SYS: 23 CM/S

## 2024-03-07 PROCEDURE — 93922 UPR/L XTREMITY ART 2 LEVELS: CPT | Mod: 26,,, | Performed by: INTERNAL MEDICINE

## 2024-03-07 PROCEDURE — 93880 EXTRACRANIAL BILAT STUDY: CPT | Mod: 26,,, | Performed by: INTERNAL MEDICINE

## 2024-03-07 PROCEDURE — 93922 UPR/L XTREMITY ART 2 LEVELS: CPT

## 2024-03-07 PROCEDURE — 93925 LOWER EXTREMITY STUDY: CPT | Mod: 26,,, | Performed by: INTERNAL MEDICINE

## 2024-03-07 PROCEDURE — 93880 EXTRACRANIAL BILAT STUDY: CPT

## 2024-03-07 PROCEDURE — 71100 X-RAY EXAM RIBS UNI 2 VIEWS: CPT | Mod: TC,RT

## 2024-03-07 PROCEDURE — 71100 X-RAY EXAM RIBS UNI 2 VIEWS: CPT | Mod: 26,RT,, | Performed by: RADIOLOGY

## 2024-03-07 PROCEDURE — 93925 LOWER EXTREMITY STUDY: CPT

## 2024-03-07 NOTE — TELEPHONE ENCOUNTER
Spoke with pt. Advised pt he would need to be seen prior to xray being ordered. Advised to be seen in urgent care. Pt states he will be seen in ER.//ddw

## 2024-03-07 NOTE — TELEPHONE ENCOUNTER
----- Message from Rosaura Byers sent at 3/7/2024 10:49 AM CST -----  Contact: Crow  Patient is over at [facility] and want to see if he can get a Xray while there. Patient fell in the  tub. Please callback [phone]

## 2024-03-08 ENCOUNTER — TELEPHONE (OUTPATIENT)
Dept: CARDIOLOGY | Facility: CLINIC | Age: 69
End: 2024-03-08
Payer: MEDICARE

## 2024-03-08 DIAGNOSIS — I65.23 BILATERAL CAROTID ARTERY STENOSIS: Primary | ICD-10-CM

## 2024-03-08 NOTE — TELEPHONE ENCOUNTER
Patient was notified of results. All questions were answered. Pt verbalized understanding. Pt will call back with any other questions or concerns.    Awaiting order for CTA to schedule  ----- Message from Jim Bolanos MD sent at 3/7/2024 11:49 PM CST -----  Has bad blockage in his  neck needs cta carotids .

## 2024-03-15 DIAGNOSIS — E11.65 UNCONTROLLED TYPE 2 DIABETES MELLITUS WITH HYPERGLYCEMIA: ICD-10-CM

## 2024-03-15 RX ORDER — LANCETS 33 GAUGE
EACH MISCELLANEOUS
Qty: 200 EACH | Refills: 5 | Status: SHIPPED | OUTPATIENT
Start: 2024-03-15

## 2024-03-19 ENCOUNTER — TELEPHONE (OUTPATIENT)
Dept: ALLERGY | Facility: CLINIC | Age: 69
End: 2024-03-19
Payer: MEDICARE

## 2024-03-20 ENCOUNTER — PATIENT MESSAGE (OUTPATIENT)
Dept: ALLERGY | Facility: CLINIC | Age: 69
End: 2024-03-20
Payer: MEDICARE

## 2024-04-02 ENCOUNTER — CLINICAL SUPPORT (OUTPATIENT)
Dept: DIABETES | Facility: CLINIC | Age: 69
End: 2024-04-02
Payer: MEDICARE

## 2024-04-02 VITALS — BODY MASS INDEX: 30.85 KG/M2 | WEIGHT: 185.44 LBS

## 2024-04-02 DIAGNOSIS — Z79.4 TYPE 2 DIABETES MELLITUS WITH HYPOGLYCEMIA WITHOUT COMA, WITH LONG-TERM CURRENT USE OF INSULIN: Primary | ICD-10-CM

## 2024-04-02 DIAGNOSIS — E11.649 TYPE 2 DIABETES MELLITUS WITH HYPOGLYCEMIA WITHOUT COMA, WITH LONG-TERM CURRENT USE OF INSULIN: Primary | ICD-10-CM

## 2024-04-02 PROCEDURE — G0108 DIAB MANAGE TRN  PER INDIV: HCPCS | Mod: S$GLB,,, | Performed by: DIETITIAN, REGISTERED

## 2024-04-02 PROCEDURE — 95249 CONT GLUC MNTR PT PROV EQP: CPT | Mod: S$GLB,,, | Performed by: DIETITIAN, REGISTERED

## 2024-04-02 PROCEDURE — 99999 PR PBB SHADOW E&M-EST. PATIENT-LVL IV: CPT | Mod: PBBFAC,,, | Performed by: DIETITIAN, REGISTERED

## 2024-04-02 NOTE — PROGRESS NOTES
Diabetes Care Specialist Progress Note  Author: Debbie Escobar RD, CDE  Date: 4/2/2024    Program Intake  Reason for Diabetes Program Visit:: Intervention (DM referral 2/27/24 Lakeisha Pastor PA-C)  Type of Intervention:: Individual  Device Training: Personal CGM (DexG7 using  - refresher training)  Current diabetes risk level:: high  In the last 12 months, have you:: used emergency room services  Was the ER or hospital admission related to diabetes?: No  Permission to speak with others about care:: no    Lab Results   Component Value Date    HGBA1C 6.4 (H) 09/29/2023     Clinical    Weight: 84.1 kg (185 lb 6.5 oz)       Body mass index is 30.85 kg/m².    Problem Review  Active comorbidities affecting diabetes self-care.: yes (HTN, HLD, CAD, CHF, Retinopathy, Neuropathy, COPD, Obesity by BMI, SANDRITA, Schizoaffective ds, Substance abuse.)    Clinical Assessment  Current Diabetes Treatment:  (Ozempic 2mg weekly. Jardiance 25mg 1tab daily. Novolog correction per s/s.)  Have you ever experienced hypoglycemia (low blood sugar)?: yes  In the last month, how often have you experienced low blood sugar?: once every other week  Are you able to tell when your blood sugar is low?: Yes  What symptoms do you experience?: Shaky, Sweaty, Dizzy/Light-headed, Anxious/nervous (blurred vision)  Have you ever been hospitalized because your blood sugar was too low?: no  How do you treat hypoglycemia (low blood sugar)?: 1/2 can soda/fruit juice, 5-6 pieces of hard candy  Have you ever experienced hyperglycemia (high blood sugar)?: yes  In the last month, how often have you experienced high blood sugar?: once every other week  Are you able to tell when your blood sugar is high?: Yes  What are your symptoms?:  (headaches)  Have you ever been hospitalized because your blood sugar was high?: no    Medication Information  How many days a week do you miss your medications?: 3 or more (Pt not recently dosing Jardiance.  Planning to contact Dr. Polanco for additional home health to assist with medication support; he reports unable to prepare pill organizer and doesn't have family support.)  Do you sometimes have difficulty refilling your medications?: No  Medication adherence impacting ability to self-manage diabetes?: Yes    Nutritional Status  Diet:  (Pt reports irregular meal patterns. Excess carb, sat fat from processed foods/snacks.)  Meal Plan 24 Hour Recall - Breakfast: none  Meal Plan 24 Hour Recall - Lunch: hamburger - water  Meal Plan 24 Hour Recall - Dinner: none  Meal Plan 24 Hour Recall - Snack: 2 cookies  Current nutritional status an area of need that is impacting patient's ability to self-manage diabetes?: Yes        Assessment Summary and Plan  Based on today's diabetes care assessment, the following areas of need were identified:          4/2/2024    12:01 AM   Clinical   Medication Adherence Yes - Pt agrees to contact Dr. Polanco's office to request additional home health support. Encouraged follow-up with provider Guillermina for diabetes medical mgmt.    Nutritional Status Yes - Reviewed role meal spacing, nutritional adequacy for overall diabetes health with tips to support including MR shake options.       Today's interventions were provided through individual discussion, instruction, and written materials were provided.    Patient verbalized understanding of instruction and written materials.  Pt was able to return back demonstration of instructions today. Patient understood key points, needs reinforcement and further instruction.     Diabetes Self-Management Care Plan  Today's Diabetes Self-Management Care Plan was developed with Crow's input. Crow has agreed to work toward the following goal(s) to improve his/her overall diabetes control.      Care Plan: Diabetes Management   Updates made since 3/3/2024 12:00 AM        Problem: Blood Glucose Self-Monitoring         Goal: Patient agrees to monitor blood  "glucose using personal Dexcom G7.    Start Date: 3/6/2024   Expected End Date: 2/27/2025   This Visit's Progress: No change   Priority: High   Barriers: No Barriers Identified   Note:    DEXCOM G7 CGM TRAINING  Refresher training provided using "Quick Start Guide" and demo device per Dexcom protocol. Patient will use Dexcom G7  to receive continuous glucose data.        Reviewed sensor site selection & rotation. Patient selected and prepped site using aseptic technique, inserted sensor, applied overlay tape and started session. Patient able to demonstrate without difficulty.   Instructed on how to use , change date/time, start new sensor, navigate menu screens.   Reviewed problem solving aspects of sensor transmission/variables that can disrupt RF transmission.  range 20 feet, but the first 3 hrs keep within 3 feet of transmitter.  Patient instructed on lag time of interstitial fluid from CBG and was advised to treat hypoglycemia and dose insulin based on SMBG values.  Dexcom technical support contact number given and examples of when to contact them discussed.          Follow Up Plan   Follow up in about 2 weeks (around 4/16/2024).  -download DexG7   -eval goals    Today's care plan and follow up schedule was discussed with patient.  Crow verbalized understanding of the care plan, goals, and agrees to follow up plan.        The patient was encouraged to communicate with his/her health care provider/physician and care team regarding his/her condition(s) and treatment.  I provided the patient with my contact information today and encouraged to contact me via phone or [facility] Patient Portal as needed.     Length of Visit   Total Time: 60 Minutes     "

## 2024-04-03 ENCOUNTER — TELEPHONE (OUTPATIENT)
Dept: INTERNAL MEDICINE | Facility: CLINIC | Age: 69
End: 2024-04-03
Payer: MEDICARE

## 2024-04-03 ENCOUNTER — TELEPHONE (OUTPATIENT)
Dept: ALLERGY | Facility: CLINIC | Age: 69
End: 2024-04-03
Payer: MEDICARE

## 2024-04-03 ENCOUNTER — PATIENT MESSAGE (OUTPATIENT)
Dept: ALLERGY | Facility: CLINIC | Age: 69
End: 2024-04-03
Payer: MEDICARE

## 2024-04-03 DIAGNOSIS — I25.118 CORONARY ARTERY DISEASE OF NATIVE ARTERY OF NATIVE HEART WITH STABLE ANGINA PECTORIS: ICD-10-CM

## 2024-04-03 RX ORDER — APIXABAN 5 MG/1
TABLET, FILM COATED ORAL
Qty: 180 TABLET | Refills: 3 | Status: SHIPPED | OUTPATIENT
Start: 2024-04-03

## 2024-04-03 NOTE — TELEPHONE ENCOUNTER
----- Message from Kaila Vaughan sent at 4/3/2024 11:18 AM CDT -----  Type:  Patient Returning Call    Who Called:pt  Who Left Message for Patient:nurse  Does the patient know what this is regarding?:return call  Would the patient rather a call back or a response via [facility]? [place]  Best Call Back Number: [phone]  Additional Information: .    Thank you

## 2024-04-03 NOTE — TELEPHONE ENCOUNTER
""Your call cannot be answered at this time.Please hang up and call again later." Message received when I called times 2.[facility] portal message sent to reschedule appointment for Xolair injection.  "

## 2024-04-09 ENCOUNTER — TELEPHONE (OUTPATIENT)
Dept: INTERNAL MEDICINE | Facility: CLINIC | Age: 69
End: 2024-04-09
Payer: MEDICARE

## 2024-04-09 DIAGNOSIS — I48.0 PAF (PAROXYSMAL ATRIAL FIBRILLATION): Chronic | ICD-10-CM

## 2024-04-09 DIAGNOSIS — I10 ESSENTIAL HYPERTENSION: Primary | ICD-10-CM

## 2024-04-09 NOTE — TELEPHONE ENCOUNTER
Tried calling pt no answer was unable to leave a VM.    ----- Message from Gabriel Yates sent at 4/8/2024  3:24 PM CDT -----  Contact: self  Pt is asking for an return call in reference to medication ,please call back at .[phone] Thx CJ

## 2024-04-10 ENCOUNTER — OFFICE VISIT (OUTPATIENT)
Dept: PODIATRY | Facility: CLINIC | Age: 69
End: 2024-04-10
Payer: MEDICARE

## 2024-04-10 ENCOUNTER — LAB VISIT (OUTPATIENT)
Dept: LAB | Facility: HOSPITAL | Age: 69
End: 2024-04-10
Attending: NURSE PRACTITIONER
Payer: MEDICARE

## 2024-04-10 VITALS — WEIGHT: 185.44 LBS | HEIGHT: 65 IN | BODY MASS INDEX: 30.89 KG/M2

## 2024-04-10 DIAGNOSIS — E11.65 UNCONTROLLED TYPE 2 DIABETES MELLITUS WITH HYPERGLYCEMIA, WITH LONG-TERM CURRENT USE OF INSULIN: ICD-10-CM

## 2024-04-10 DIAGNOSIS — B35.1 DERMATOPHYTOSIS OF NAIL: ICD-10-CM

## 2024-04-10 DIAGNOSIS — Z79.4 UNCONTROLLED TYPE 2 DIABETES MELLITUS WITH HYPERGLYCEMIA, WITH LONG-TERM CURRENT USE OF INSULIN: ICD-10-CM

## 2024-04-10 DIAGNOSIS — L84 PRE-ULCERATIVE CALLUSES: ICD-10-CM

## 2024-04-10 DIAGNOSIS — E11.49 TYPE II DIABETES MELLITUS WITH NEUROLOGICAL MANIFESTATIONS: ICD-10-CM

## 2024-04-10 DIAGNOSIS — E11.9 ENCOUNTER FOR COMPREHENSIVE DIABETIC FOOT EXAMINATION, TYPE 2 DIABETES MELLITUS: Primary | ICD-10-CM

## 2024-04-10 LAB
ESTIMATED AVG GLUCOSE: 117 MG/DL (ref 68–131)
HBA1C MFR BLD: 5.7 % (ref 4–5.6)

## 2024-04-10 PROCEDURE — 1100F PTFALLS ASSESS-DOCD GE2>/YR: CPT | Mod: CPTII,S$GLB,, | Performed by: PODIATRIST

## 2024-04-10 PROCEDURE — 1160F RVW MEDS BY RX/DR IN RCRD: CPT | Mod: CPTII,S$GLB,, | Performed by: PODIATRIST

## 2024-04-10 PROCEDURE — 11056 PARNG/CUTG B9 HYPRKR LES 2-4: CPT | Mod: Q7,S$GLB,, | Performed by: PODIATRIST

## 2024-04-10 PROCEDURE — 83036 HEMOGLOBIN GLYCOSYLATED A1C: CPT | Performed by: NURSE PRACTITIONER

## 2024-04-10 PROCEDURE — 99999 PR PBB SHADOW E&M-EST. PATIENT-LVL IV: CPT | Mod: PBBFAC,,, | Performed by: PODIATRIST

## 2024-04-10 PROCEDURE — 1159F MED LIST DOCD IN RCRD: CPT | Mod: CPTII,S$GLB,, | Performed by: PODIATRIST

## 2024-04-10 PROCEDURE — 3288F FALL RISK ASSESSMENT DOCD: CPT | Mod: CPTII,S$GLB,, | Performed by: PODIATRIST

## 2024-04-10 PROCEDURE — 36415 COLL VENOUS BLD VENIPUNCTURE: CPT | Performed by: NURSE PRACTITIONER

## 2024-04-10 PROCEDURE — 11721 DEBRIDE NAIL 6 OR MORE: CPT | Mod: 59,Q7,S$GLB, | Performed by: PODIATRIST

## 2024-04-10 PROCEDURE — 99213 OFFICE O/P EST LOW 20 MIN: CPT | Mod: 25,S$GLB,, | Performed by: PODIATRIST

## 2024-04-10 PROCEDURE — 3008F BODY MASS INDEX DOCD: CPT | Mod: CPTII,S$GLB,, | Performed by: PODIATRIST

## 2024-04-10 NOTE — PROGRESS NOTES
Subjective:     Patient ID: Crow Green is a 68 y.o. male.    Chief Complaint: Nail Care (Diabetic pt wears tennis shoes, PCP Dr. Polanco last seen 2-27-24)      Crow is a 68 y.o. male who presents to the clinic for evaluation and treatment of high risk feet. Crow has a past medical history of Arthritis, Asthma, Atrial fibrillation, Atrial fibrillation with RVR (08/07/2019), Carotid artery stenosis, CHF (congestive heart failure), Chronic obstructive pulmonary disease (08/05/2020), Depression, Dermatomyositis, Diabetes mellitus, type 2 (Diagnosed in 2000), Elevated coronary artery calcium score (02/14/2024), Elevated PSA, Follow up (07/30/2020), Hypertension, Myocardial infarction, and Sleep apnea. The patient's chief complaint is long, thick toenails. This patient has documented high risk feet requiring routine maintenance secondary to diabetes mellitis and those secondary complications of diabetes, as mentioned.. Patient state she is running out of Gabapentin and his feet and hands are burning and tingling.     PCP: Cm Polanco MD    Date Last Seen by PCP: 02/27/2024    Current shoe gear:  Affected Foot: Rx diabetic extra depth shoes and custom accommodative insoles     Unaffected Foot: Rx diabetic extra depth shoes and custom accommodative insoles    Hemoglobin A1C   Date Value Ref Range Status   09/29/2023 6.4 (H) 4.0 - 5.6 % Final     Comment:     ADA Screening Guidelines:  5.7-6.4%  Consistent with prediabetes  >or=6.5%  Consistent with diabetes    High levels of fetal hemoglobin interfere with the HbA1C  assay. Heterozygous hemoglobin variants (HbS, HgC, etc)do  not significantly interfere with this assay.   However, presence of multiple variants may affect accuracy.     05/23/2023 6.2 <=6.5 % Final   01/04/2023 6.3 (H) 4.0 - 5.6 % Final     Comment:     ADA Screening Guidelines:  5.7-6.4%  Consistent with prediabetes  >or=6.5%  Consistent with diabetes    High levels of fetal hemoglobin  interfere with the HbA1C  assay. Heterozygous hemoglobin variants (HbS, HgC, etc)do  not significantly interfere with this assay.   However, presence of multiple variants may affect accuracy.     07/31/2022 6.0 (H) 4.0 - 5.6 % Final     Comment:     ADA Screening Guidelines:  5.7-6.4%  Consistent with prediabetes  >or=6.5%  Consistent with diabetes    High levels of fetal hemoglobin interfere with the HbA1C  assay. Heterozygous hemoglobin variants (HbS, HgC, etc)do  not significantly interfere with this assay.   However, presence of multiple variants may affect accuracy.         Patient Active Problem List   Diagnosis    ED (erectile dysfunction)    Non-proliferative diabetic retinopathy, mild, both eyes    Essential hypertension    Diabetic polyneuropathy    Nonobstructive coronary artery disease of native artery of native heart    Chronic combined systolic and diastolic heart failure    Type 2 diabetes mellitus    SANDRITA (obstructive sleep apnea)    Allergic asthma    Psychophysiological insomnia    Nightmares    Sleep related gastroesophageal reflux disease    Inadequate sleep hygiene    Chest pain    PAF (paroxysmal atrial fibrillation)    At risk for medication noncompliance    Obesity (BMI 30.0-34.9)    Indeterminate stage chronic open angle glaucoma    Right hip pain    Gait instability    Chronic right shoulder pain    Arthritis    Left sided sciatica    Osteoarthritis of spine with radiculopathy, lumbar region    HNP (herniated nucleus pulposus), lumbar    Gastroesophageal reflux disease without esophagitis    Hypogonadism in male    Disorder of parathyroid gland    Hyperlipidemia associated with type 2 diabetes mellitus    Traumatic tear of left rotator cuff    Other chronic pain    Left shoulder pain    Complete tear of left rotator cuff    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 associated with type 2 diabetes mellitus    Lumbar radiculopathy    Diabetic ulcer of toe of  left foot associated with type 2 diabetes mellitus, with necrosis of muscle    Ulcer of left foot    Elevated troponin    Hypotension due to medication    Dermatomyositis    Postprocedural hypotension    Advanced directives, counseling/discussion    Schizoaffective disorder, depressive type    Chronic obstructive pulmonary disease    MDD (major depressive disorder), recurrent episode, moderate    DOMINIK (generalized anxiety disorder)    Bilateral carpal tunnel syndrome    Rotator cuff tear arthropathy of left shoulder    Sacroiliac dysfunction    Atherosclerosis of native coronary artery of native heart with unstable angina pectoris    Coronary vasospasm    Cocaine abuse    Osteopenia    Atherosclerosis of aorta    Cardiac asthma    Chronic gout of multiple sites    Acquired absence of left great toe    Hypoxia    Cardiomyopathy    Bilateral carotid artery stenosis    Substance abuse(UDS + for cocaine )    Immunocompromised patient    Other reduced mobility    Abnormality of gait and mobility    Dependence on supplemental oxygen    Positive depression screening    Pulmonary hypertension- echo 7/2022    NICM (nonischemic cardiomyopathy)    Elevated IgE level    Moderate persistent asthma without complication    Elevated coronary artery calcium score       Medication List with Changes/Refills   Current Medications    ALBUTEROL (PROVENTIL) 2.5 MG /3 ML (0.083 %) NEBULIZER SOLUTION    INHALE THE CONTENTS OF 1 VIAL VIA NEBULIZER EVERY 4 TO 6 HOURS AS NEEDED FOR WHEEZING OR FOR SHORTNESS OF BREATH    ALLOPURINOL (ZYLOPRIM) 100 MG TABLET    Take 1 tablet (100 mg total) by mouth once daily.    AMLODIPINE (NORVASC) 2.5 MG TABLET    Take 1 tablet (2.5 mg total) by mouth once daily.    ATORVASTATIN (LIPITOR) 40 MG TABLET    Take 1 tablet (40 mg total) by mouth every evening.    BLOOD SUGAR DIAGNOSTIC (TRUE METRIX GLUCOSE TEST STRIP) STRP    Inject 1 each into the skin 4 (four) times daily.    BLOOD-GLUCOSE METER MISC    Use as  instructed to test blood glucose meter daily.    BRINZOLAMIDE-BRIMONIDINE (SIMBRINZA) 1-0.2 % DRPS    Place 1 drop into both eyes 3 (three) times daily.    CYANOCOBALAMIN (VITAMIN B-12) 1000 MCG TABLET    Take 1 tablet by mouth once daily.    DEXCOM G7 SENSOR VIN    Change sensor every 10 days    ELIQUIS 5 MG TAB    TAKE 1 TABLET TWICE DAILY    EMPAGLIFLOZIN (JARDIANCE) 25 MG TABLET    Take 1 tablet (25 mg total) by mouth once daily.    EPINEPHRINE (EPIPEN) 0.3 MG/0.3 ML ATIN    Inject 0.3 mLs (0.3 mg total) into the muscle once. for 1 dose    FLUTICASONE-UMECLIDIN-VILANTER (TRELEGY ELLIPTA) 200-62.5-25 MCG INHALER    Inhale 1 puff into the lungs once daily.    FUROSEMIDE (LASIX) 40 MG TABLET    Take 2 tablets (80 mg total) by mouth 2 (two) times a day.    GABAPENTIN (NEURONTIN) 800 MG TABLET    Take 1 tablet (800 mg total) by mouth 3 (three) times daily.    INSULIN ASPART U-100 (NOVOLOG FLEXPEN U-100 INSULIN) 100 UNIT/ML (3 ML) INPN PEN    INJECT 10 UNITS UNDER THE SKIN THREE TIMES DAILY WITH MEALS    INV ALLOPURINOL TABLET    Take 1 tablet by mouth once daily.    LANCETS (TRUEPLUS LANCETS) 33 GAUGE MISC    Use to check blood sugar twice a day    LANCETS (TRUEPLUS LANCETS) 33 GAUGE MISC    USE AS DIRECTED TO TEST BLOOD SUGAR FOUR TIMES DAILY    LATANOPROST 0.005 % OPHTHALMIC SOLUTION    INSTILL 1 DROP INTO BOTH EYES ONE TIME DAILY BOTTLE EXPIRES 42 DAYS AFTER OPENING...    LISINOPRIL (PRINIVIL,ZESTRIL) 30 MG TABLET    TAKE 1 TABLET EVERY DAY.    LISINOPRIL 10 MG TABLET    TAKE 1 TABLET EVERY DAY    MULTIVITAMIN-IRON-FOLIC ACID (SENTRY) TAB    Take 1 tablet by mouth once daily.    NAPROXEN (NAPROSYN) 500 MG TABLET    Take 1 tablet (500 mg total) by mouth 2 (two) times daily with meals.    OMALIZUMAB (XOLAIR) 150 MG/ML INJECTION    Inject 2 mLs (300 mg total) into the skin every 14 (fourteen) days.    OMALIZUMAB (XOLAIR) 75 MG/0.5 ML INJECTION    Inject 0.5 mLs (75 mg total) into the skin every 14 (fourteen) days.  "   OMEPRAZOLE (PRILOSEC) 20 MG CAPSULE    Take 20 mg by mouth every morning.    PANTOPRAZOLE (PROTONIX) 40 MG TABLET    Take 1 tablet (40 mg total) by mouth once daily.    PAPAVERINE-PHENTOLAMIN-ALPROST 150 MG-5 MG- 50 MCG SOLR    0.5 mLs by Intracavernosal route as needed (ED).    PEN NEEDLE, DIABETIC (BD ULTRA-FINE SHORT PEN NEEDLE) 31 GAUGE X 5/16" NDLE    1 each by Misc.(Non-Drug; Combo Route) route 3 (three) times daily.    PEP INJECTION    Inject 0.15 ml as directed     For compounding pharmacy use:   Add PAPAVERINE 30 mg  Add PHENTOLAMINE 1 mg  Add ALPROSTADIL 20 mcg    PREDNISONE (DELTASONE) 50 MG TAB    Take 50 mg by mouth.    RANOLAZINE (RANEXA) 1,000 MG TB12    TAKE 1 TABLET TWICE DAILY    SEMAGLUTIDE (OZEMPIC) 2 MG/DOSE (8 MG/3 ML) PNIJ    Inject 2 mg into the skin every 7 days.    SERTRALINE (ZOLOFT) 100 MG TABLET    Take 1 tablet (100 mg total) by mouth every evening.    TAMSULOSIN (FLOMAX) 0.4 MG CAP    Take 1 capsule (0.4 mg total) by mouth once daily.    TRAMADOL (ULTRAM) 50 MG TABLET    Take 50 mg by mouth.    TRAZODONE (DESYREL) 100 MG TABLET    TAKE 2 TABLETS EVERY EVENING    VIOS AEROSOL DELIVERY SYSTEM VIN    USE AS DIRESTED       Review of patient's allergies indicates:   Allergen Reactions    Protamine Hives     Urticaria, possible upper airway swelling       Past Surgical History:   Procedure Laterality Date    ABLATION OF ARRHYTHMOGENIC FOCUS FOR ATRIAL FIBRILLATION N/A 2/27/2020    Procedure: Ablation atrial fibrillation;  Surgeon: Gio Brown MD;  Location: [facility] EP LAB;  Service: Cardiology;  Laterality: N/A;  afib, DAMIEN (cx if SR), PVI, PITO, anes, MB, 3 Prep    CHOLECYSTECTOMY      CLOSURE OF WOUND Left 7/1/2020    Procedure: CLOSURE, WOUND;  Surgeon: Barb Veras DPM;  Location: [facility] OR;  Service: Podiatry;  Laterality: Left;    COLONOSCOPY N/A 3/4/2022    Procedure: COLONOSCOPY;  Surgeon: Yolis Freitas MD;  Location: [facility] ENDO;  Service: Endoscopy;  Laterality: N/A; "    ESOPHAGOGASTRODUODENOSCOPY N/A 3/4/2022    Procedure: EGD (ESOPHAGOGASTRODUODENOSCOPY);  Surgeon: Yolis Freitas MD;  Location: [facility] ENDO;  Service: Endoscopy;  Laterality: N/A;    INJECTION OF ANESTHETIC AGENT INTO SACROILIAC JOINT Left 3/24/2021    Procedure: Left BLOCK, SACROILIAC JOINT and bilateral rhomboid TPI;  Surgeon: Dillan Tsai MD;  Location: [facility] PAIN MGT;  Service: Pain Management;  Laterality: Left;    INTRALUMINAL GASTROINTESTINAL TRACT IMAGING VIA CAPSULE N/A 3/22/2022    Procedure: IMAGING PROCEDURE, GI TRACT, INTRALUMINAL, VIA CAPSULE;  Surgeon: Justice Martinez RN;  Location: [facility] ENDO;  Service: Endoscopy;  Laterality: N/A;    REVERSE TOTAL SHOULDER ARTHROPLASTY Left 1/10/2022    Procedure: ARTHROPLASTY, SHOULDER, TOTAL, REVERSE;  Surgeon: Anthony Alvarado MD;  Location: [facility] OR;  Service: Orthopedics;  Laterality: Left;  left reverse total shoulder arthroplasty     SELECTIVE INJECTION OF ANESTHETIC AGENT AROUND LUMBAR SPINAL NERVE ROOT BY TRANSFORAMINAL APPROACH Left 6/11/2020    Procedure: BLOCK, SPINAL NERVE ROOT, LUMBAR, SELECTIVE, TRANSFORAMINAL APPROACH Left L4-5, L5-S1 TFESI with RN IV sedation;  Surgeon: Dillan Tsai MD;  Location: [facility] PAIN MGT;  Service: Pain Management;  Laterality: Left;    TOE AMPUTATION Left 6/29/2020    Procedure: AMPUTATION, TOE;  Surgeon: Barb Veras DPM;  Location: [facility] OR;  Service: Podiatry;  Laterality: Left;  great toe       Family History   Problem Relation Age of Onset    Hypertension Mother     Glaucoma Mother     Cataracts Mother     Diabetes Mother     Cataracts Father     Stroke Father     Glaucoma Sister         x3    Cataracts Sister         x3    Diabetes Sister         x1    Stroke Sister         x1    No Known Problems Brother     No Known Problems Daughter     No Known Problems Son     Glaucoma Maternal Aunt     Diabetes Maternal Aunt     Cancer Maternal Grandfather         lung (smoker)    Prostate cancer Neg Hx     Heart  disease Neg Hx     Kidney disease Neg Hx        Social History     Socioeconomic History    Marital status: Legally     Number of children: 5   Occupational History    Occupation: disabled    Occupation: PT at [employer]    Tobacco Use    Smoking status: Never    Smokeless tobacco: Never   Substance and Sexual Activity    Alcohol use: Yes     Comment: rare, maybe holidays    Drug use: Not Currently     Types: Cocaine    Sexual activity: Not Currently     Partners: Female   Social History Narrative    Utilizing [employer] transportation which has been unreliable.      Social Determinants of Health     Financial Resource Strain: Low Risk  (12/7/2022)    Overall Financial Resource Strain (CARDIA)     Difficulty of Paying Living Expenses: Not very hard   Food Insecurity: No Food Insecurity (12/7/2022)    Hunger Vital Sign     Worried About Running Out of Food in the Last Year: Never true     Ran Out of Food in the Last Year: Never true   Transportation Needs: Unmet Transportation Needs (12/7/2022)    PRAPARE - Transportation     Lack of Transportation (Medical): Yes     Lack of Transportation (Non-Medical): Yes   Physical Activity: Inactive (12/7/2022)    Exercise Vital Sign     Days of Exercise per Week: 0 days     Minutes of Exercise per Session: 0 min   Stress: Stress Concern Present (12/7/2022)    [place] [employer] - Occupational Stress Questionnaire     Feeling of Stress : Rather much   Social Connections: Moderately Isolated (12/7/2022)    Social Connection and Isolation Panel [NHANES]     Frequency of Communication with Friends and Family: Once a week     Frequency of Social Gatherings with Friends and Family: Once a week     Attends Jew Services: More than 4 times per year     Active Member of Clubs or Organizations: No     Attends Club or Organization Meetings: More than 4 times per year     Marital Status:    Housing Stability: Low Risk  (12/7/2022)     "Housing Stability Vital Sign     Unable to Pay for Housing in the Last Year: No     Number of Places Lived in the Last Year: 1     Unstable Housing in the Last Year: No       Vitals:    04/10/24 0832   Weight: 84.1 kg (185 lb 6.5 oz)   Height: 5' 5" (1.651 m)       Hemoglobin A1C   Date Value Ref Range Status   09/29/2023 6.4 (H) 4.0 - 5.6 % Final     Comment:     ADA Screening Guidelines:  5.7-6.4%  Consistent with prediabetes  >or=6.5%  Consistent with diabetes    High levels of fetal hemoglobin interfere with the HbA1C  assay. Heterozygous hemoglobin variants (HbS, HgC, etc)do  not significantly interfere with this assay.   However, presence of multiple variants may affect accuracy.     05/23/2023 6.2 <=6.5 % Final   01/04/2023 6.3 (H) 4.0 - 5.6 % Final     Comment:     ADA Screening Guidelines:  5.7-6.4%  Consistent with prediabetes  >or=6.5%  Consistent with diabetes    High levels of fetal hemoglobin interfere with the HbA1C  assay. Heterozygous hemoglobin variants (HbS, HgC, etc)do  not significantly interfere with this assay.   However, presence of multiple variants may affect accuracy.     07/31/2022 6.0 (H) 4.0 - 5.6 % Final     Comment:     ADA Screening Guidelines:  5.7-6.4%  Consistent with prediabetes  >or=6.5%  Consistent with diabetes    High levels of fetal hemoglobin interfere with the HbA1C  assay. Heterozygous hemoglobin variants (HbS, HgC, etc)do  not significantly interfere with this assay.   However, presence of multiple variants may affect accuracy.         Review of Systems   Constitutional:  Negative for chills and fever.   Respiratory:  Negative for shortness of breath.    Cardiovascular:  Negative for chest pain, palpitations, orthopnea, claudication and leg swelling.   Gastrointestinal:  Negative for diarrhea, nausea and vomiting.   Musculoskeletal:  Negative for joint pain.   Skin:  Negative for rash.   Neurological:  Positive for tingling and sensory change.   Psychiatric/Behavioral: " Negative.               Objective:      PHYSICAL EXAM: Apperance: Alert and orient in no distress,well developed, and with good attention to grooming and body habits  Patient presents ambulating in diabetic shoes and inserts.   LOWER EXTREMITY EXAM:  VASCULAR: Dorsalis pedis pulses 2/4 bilateral and Posterior Tibial pulses 2/4 bilateral. Capillary fill time <4 seconds bilateral. No edema observed bilateral. Varicosities absent bilateral. Skin temperature of the lower extremities is warm to warm, proximal to distal. Hair growth dim bilateral.  DERMATOLOGICAL: No skin rashes, subcutaneous nodules, lesions, or ulcers observed bilateral. Nails 1,2,3,4,5 right and 2,3,4,5 left elongated, thickened, and discolored with subungual debris. Webspaces 1,2,3,4 bilateral clean, dry and without evidence of break in skin integrity. Mild hyperkeratotic tissue noted to left medial 1st MPJ and distal hallux amputation stump.   NEUROLOGICAL: Light touch, sharp-dull, proprioception all present and equal bilaterally.  Vibratory sensation diminished at bilateral hallux. Protective sensation absent sites as tested with a Needham-Kimmie 5.07 monofilament.   MUSCULOSKELETAL: Muscle strength is 5/5 for foot inverters, everters, plantarflexors, and dorsiflexors. Muscle tone is normal. Left hallux amputation noted.           Assessment:       ICD-10-CM ICD-9-CM   1. Encounter for comprehensive diabetic foot examination, type 2 diabetes mellitus  E11.9 250.00   2. Type II diabetes mellitus with neurological manifestations  E11.49 250.60   3. Pre-ulcerative calluses - Left Foot  L84 700   4. Dermatophytosis of nail  B35.1 110.1           Plan:   Encounter for comprehensive diabetic foot examination, type 2 diabetes mellitus    Type II diabetes mellitus with neurological manifestations    Pre-ulcerative calluses - Left Foot    Dermatophytosis of nail    I counseled the patient on his conditions, regarding findings of my examination, my  impressions, and usual treatment plan.   Appointment spent on education about the diabetic foot, neuropathy, and prevention of limb loss.  Shoe inspection. Diabetic Foot Education. Patient reminded of the importance of good nutrition and blood sugar control to help prevent podiatric complications of diabetes. Patient instructed on proper foot hygeine. We discussed wearing proper shoe gear, daily foot inspections, never walking without protective shoe gear, never putting sharp instruments to feet.    With patient's permission, nails 1-5 bilateral were debrided/trimmed in length and thickness aggressively to their soft tissue attachment mechanically and with electric , removing all offending nail and debris. Patient relates relief following the procedure.  With patient's permission, left foot pre-ulcerative callus trimmed in thickness with #15 blade in thickness without incident.   Patient  will continue to monitor the areas daily, inspect feet, wear protective shoe gear when ambulatory, moisturizer to maintain skin integrity. Patient reminded of the importance of good nutrition and blood sugar control to help prevent podiatric complications of diabetes.  Patient to return 4 months or sooner if needed.            Barb Veras DPM  [facility]

## 2024-04-15 ENCOUNTER — OFFICE VISIT (OUTPATIENT)
Dept: ALLERGY | Facility: CLINIC | Age: 69
End: 2024-04-15
Payer: MEDICARE

## 2024-04-15 VITALS
HEART RATE: 84 BPM | TEMPERATURE: 98 F | DIASTOLIC BLOOD PRESSURE: 85 MMHG | WEIGHT: 193.31 LBS | BODY MASS INDEX: 32.17 KG/M2 | SYSTOLIC BLOOD PRESSURE: 119 MMHG

## 2024-04-15 DIAGNOSIS — J44.9 CHRONIC OBSTRUCTIVE PULMONARY DISEASE, UNSPECIFIED COPD TYPE: ICD-10-CM

## 2024-04-15 DIAGNOSIS — J30.89 ALLERGIC RHINITIS DUE TO AMERICAN HOUSE DUST MITE: Primary | ICD-10-CM

## 2024-04-15 DIAGNOSIS — J45.40 MODERATE PERSISTENT ASTHMA WITHOUT COMPLICATION: ICD-10-CM

## 2024-04-15 DIAGNOSIS — Z99.81 DEPENDENCE ON SUPPLEMENTAL OXYGEN: ICD-10-CM

## 2024-04-15 PROCEDURE — 3044F HG A1C LEVEL LT 7.0%: CPT | Mod: CPTII,S$GLB,, | Performed by: ALLERGY & IMMUNOLOGY

## 2024-04-15 PROCEDURE — 99215 OFFICE O/P EST HI 40 MIN: CPT | Mod: 25,S$GLB,, | Performed by: ALLERGY & IMMUNOLOGY

## 2024-04-15 PROCEDURE — 99999 PR PBB SHADOW E&M-EST. PATIENT-LVL V: CPT | Mod: PBBFAC,,, | Performed by: ALLERGY & IMMUNOLOGY

## 2024-04-15 PROCEDURE — 3079F DIAST BP 80-89 MM HG: CPT | Mod: CPTII,S$GLB,, | Performed by: ALLERGY & IMMUNOLOGY

## 2024-04-15 PROCEDURE — 94640 AIRWAY INHALATION TREATMENT: CPT | Mod: 76,S$GLB,, | Performed by: ALLERGY & IMMUNOLOGY

## 2024-04-15 PROCEDURE — 3008F BODY MASS INDEX DOCD: CPT | Mod: CPTII,S$GLB,, | Performed by: ALLERGY & IMMUNOLOGY

## 2024-04-15 PROCEDURE — 3074F SYST BP LT 130 MM HG: CPT | Mod: CPTII,S$GLB,, | Performed by: ALLERGY & IMMUNOLOGY

## 2024-04-15 PROCEDURE — 1159F MED LIST DOCD IN RCRD: CPT | Mod: CPTII,S$GLB,, | Performed by: ALLERGY & IMMUNOLOGY

## 2024-04-15 PROCEDURE — 1125F AMNT PAIN NOTED PAIN PRSNT: CPT | Mod: CPTII,S$GLB,, | Performed by: ALLERGY & IMMUNOLOGY

## 2024-04-15 RX ORDER — PREDNISONE 20 MG/1
20 TABLET ORAL 2 TIMES DAILY
Qty: 10 TABLET | Refills: 0 | Status: SHIPPED | OUTPATIENT
Start: 2024-04-15 | End: 2024-05-15

## 2024-04-15 RX ORDER — IPRATROPIUM BROMIDE AND ALBUTEROL SULFATE 2.5; .5 MG/3ML; MG/3ML
3 SOLUTION RESPIRATORY (INHALATION)
Status: COMPLETED | OUTPATIENT
Start: 2024-04-15 | End: 2024-04-15

## 2024-04-15 RX ADMIN — IPRATROPIUM BROMIDE AND ALBUTEROL SULFATE 3 ML: 2.5; .5 SOLUTION RESPIRATORY (INHALATION) at 10:04

## 2024-04-15 NOTE — PROGRESS NOTES
"Subjective:   Patient: Crow Green [id number], :1955   Visit date:4/15/2024 2:39 PM    Chief Complaint:  Shortness of Breath      HPI:    Prior notes reviewed by myself.  Clinical documentation obtained by nursing staff reviewed.       HPI 4/15/2024: 68 year old male with a history of elevated IgE, dust mite allergy, asthma, dermatomyositis and COPD here for follow up. Xolair has been ordered numerous times, but he has not started.  He reports significant shortness of breath that he relates to recent smoke exposure at his neighbor's home on yesterday.          2023: 68 year old male with a history of elevated igE, dust mite allergy, asthma, dermatomyositis , and COPD here for follow up.  Xolair was ordered previously, but he did not start it.  Signed Xolair consent in January.  He is on O2 intermittently.  Last required albuterol yesterday. Requires daily      Dry skin- arms, head and neck          2023: 67 year old male with asthma, COPD and dust mite allergy.  He is on O2 "all the time". He currently does not have O2 on today in the office- "I don't want to travel with that big tank on me."  Last required his rescue inhaler yesterday. He reports requiring it daily.  He reports that exertion exacerbates symptoms.  He reports coughing and wheezing at night.  He denies requiring oral steroids or a steroid injection.  He reports that he is taking Flonase, which helps his runny nose and nasal congestion.    HPI:     24 - 68 yo M referred by pulm for an evaluation of elevated IgE:    Hx of copd and asthma ; advair and albuterol  Hx of SANDRITA - CPAP  Hx of CHF - follows with cardiologist    Elevated IgE (1367)  Never tested for allergies, has not received AIT  Ssx - rhinorrhea, nasal congestion, sneezing, watery eyes; intermittent,, year round  Meds - otc allergy eye drops; no anti-histamines    No food allergies    Environmental History:  Environmental Hx: Pets in the home: none. Does not " smoke.       Past Medical History:   Diagnosis Date    Arthritis     Asthma     Atrial fibrillation     Atrial fibrillation with RVR 08/07/2019    Carotid artery stenosis     CHF (congestive heart failure)     Chronic obstructive pulmonary disease 08/05/2020    Depression     Dermatomyositis     Diabetes mellitus, type 2 Diagnosed in 2000    Elevated coronary artery calcium score 02/14/2024    Elevated PSA     Follow up 07/30/2020    Hypertension     Myocardial infarction     2017    Sleep apnea         Family History   Problem Relation Name Age of Onset    Hypertension Mother      Glaucoma Mother      Cataracts Mother      Diabetes Mother      Cataracts Father      Stroke Father      Glaucoma Sister 4         x3    Cataracts Sister 4         x3    Diabetes Sister 4         x1    Stroke Sister 4         x1    No Known Problems Brother 2     No Known Problems Daughter 2     No Known Problems Son 3     Glaucoma Maternal Aunt      Diabetes Maternal Aunt      Cancer Maternal Grandfather          lung (smoker)    Prostate cancer Neg Hx      Heart disease Neg Hx      Kidney disease Neg Hx         Social History     Socioeconomic History    Marital status: Legally     Number of children: 5   Occupational History    Occupation: disabled    Occupation: PT at [employer]    Tobacco Use    Smoking status: Never    Smokeless tobacco: Never   Substance and Sexual Activity    Alcohol use: Yes     Comment: rare, maybe holidays    Drug use: Not Currently     Types: Cocaine    Sexual activity: Not Currently     Partners: Female   Social History Narrative    Utilizing [employer] transportation which has been unreliable.      Social Determinants of Health     Financial Resource Strain: Low Risk  (7/25/2023)    Received from [facility] Subsidi[facility] and Affiliates    Overall Financial Resource Strain (CARDIA)     Difficulty of Paying Living Expenses: Not hard at all    Food Insecurity: No Food Insecurity (7/25/2023)    [facility]    Hunger Vital Sign     Worried About Running Out of Food in the Last Year: Never true     Ran Out of Food in the Last Year: Never true   Transportation Needs: No Transportation Needs (7/25/2023)    [facility]    PRAPARE - Transportation     Lack of Transportation (Medical): No     Lack of Transportation (Non-Medical): No   Physical Activity: Inactive (12/7/2022)    Exercise Vital Sign     Days of Exercise per Week: 0 days     Minutes of Exercise per Session: 0 min   Stress: No Stress Concern Present (7/25/2023)    Received from [facility] and [facility] and Affiliates    [employer] [place] [employer] - Occupational Stress Questionnaire     Feeling of Stress : Not at all   Social Connections: Moderately Isolated (7/25/2023)    Received from [facility] and [facility]    Social Connection and Isolation Panel [NHANES]     Frequency of Communication with Friends and Family: More than three times a week     Frequency of Social Gatherings with Friends and Family: More than three times a week     Attends [employer] Services: 1 to 4 times per year     Active Member of Clubs or Organizations: No     Attends Club or Organization Meetings: Never     Marital Status:    Housing Stability: Unknown (7/25/2023)    [facility]    Housing Stability Vital Sign     Unable to Pay for Housing in the Last Year: No       Review of patient's allergies indicates:   Allergen Reactions    Protamine Hives     Urticaria, possible upper airway swelling           Medication List with Changes/Refills    New Medications    PREDNISONE (DELTASONE) 20 MG TABLET    Take 1 tablet (20 mg total) by mouth 2 (two) times daily.   Current Medications    ALBUTEROL (PROVENTIL) 2.5 MG /3 ML (0.083 %) NEBULIZER SOLUTION    INHALE THE CONTENTS OF 1 VIAL VIA NEBULIZER EVERY 4 TO 6 HOURS AS NEEDED FOR WHEEZING OR FOR SHORTNESS OF BREATH    ALLOPURINOL (ZYLOPRIM) 100 MG TABLET    Take 1 tablet (100 mg total) by mouth once daily.    AMLODIPINE (NORVASC) 2.5 MG TABLET    Take 1 tablet (2.5 mg total) by mouth once daily.    ATORVASTATIN (LIPITOR) 40 MG TABLET    Take 1 tablet (40 mg total) by mouth every evening.    BLOOD SUGAR DIAGNOSTIC (TRUE METRIX GLUCOSE TEST STRIP) STRP    Inject 1 each into the skin 4 (four) times daily.    BLOOD-GLUCOSE METER MISC    Use as instructed to test blood glucose meter daily.    BRINZOLAMIDE-BRIMONIDINE (SIMBRINZA) 1-0.2 % DRPS    Place 1 drop into both eyes 3 (three) times daily.    CYANOCOBALAMIN (VITAMIN B-12) 1000 MCG TABLET    Take 1 tablet by mouth once daily.    DEXCOM G7 SENSOR VIN    Change sensor every 10 days    ELIQUIS 5 MG TAB    TAKE 1 TABLET TWICE DAILY    EMPAGLIFLOZIN (JARDIANCE) 25 MG TABLET    Take 1 tablet (25 mg total) by mouth once daily.    EPINEPHRINE (EPIPEN) 0.3 MG/0.3 ML ATIN    Inject 0.3 mLs (0.3 mg total) into the muscle once. for 1 dose    FLUTICASONE-UMECLIDIN-VILANTER (TRELEGY ELLIPTA) 200-62.5-25 MCG INHALER    Inhale 1 puff into the lungs once daily.    FUROSEMIDE (LASIX) 40 MG TABLET    Take 2 tablets (80 mg total) by mouth 2 (two) times a day.    GABAPENTIN (NEURONTIN) 800 MG TABLET    Take 1 tablet (800 mg total) by mouth 3 (three) times daily.    INSULIN ASPART U-100 (NOVOLOG FLEXPEN U-100 INSULIN) 100 UNIT/ML (3 ML) INPN PEN    INJECT 10 UNITS UNDER THE SKIN THREE TIMES DAILY WITH MEALS    INV ALLOPURINOL TABLET    Take 1 tablet by mouth once daily.    LANCETS (TRUEPLUS LANCETS) 33 GAUGE MISC    Use to check blood sugar twice a day    LANCETS (TRUEPLUS LANCETS)  "33 GAUGE MISC    USE AS DIRECTED TO TEST BLOOD SUGAR FOUR TIMES DAILY    LATANOPROST 0.005 % OPHTHALMIC SOLUTION    INSTILL 1 DROP INTO BOTH EYES ONE TIME DAILY BOTTLE EXPIRES 42 DAYS AFTER OPENING...    LISINOPRIL (PRINIVIL,ZESTRIL) 30 MG TABLET    TAKE 1 TABLET EVERY DAY.    LISINOPRIL 10 MG TABLET    TAKE 1 TABLET EVERY DAY    MULTIVITAMIN-IRON-FOLIC ACID (SENTRY) TAB    Take 1 tablet by mouth once daily.    NAPROXEN (NAPROSYN) 500 MG TABLET    Take 1 tablet (500 mg total) by mouth 2 (two) times daily with meals.    OMALIZUMAB (XOLAIR) 150 MG/ML INJECTION    Inject 2 mLs (300 mg total) into the skin every 14 (fourteen) days.    OMALIZUMAB (XOLAIR) 75 MG/0.5 ML INJECTION    Inject 0.5 mLs (75 mg total) into the skin every 14 (fourteen) days.    OMEPRAZOLE (PRILOSEC) 20 MG CAPSULE    Take 20 mg by mouth every morning.    PANTOPRAZOLE (PROTONIX) 40 MG TABLET    Take 1 tablet (40 mg total) by mouth once daily.    PAPAVERINE-PHENTOLAMIN-ALPROST 150 MG-5 MG- 50 MCG SOLR    0.5 mLs by Intracavernosal route as needed (ED).    PEN NEEDLE, DIABETIC (BD ULTRA-FINE SHORT PEN NEEDLE) 31 GAUGE X 5/16" NDLE    1 each by Misc.(Non-Drug; Combo Route) route 3 (three) times daily.    PEP INJECTION    Inject 0.15 ml as directed     For compounding pharmacy use:   Add PAPAVERINE 30 mg  Add PHENTOLAMINE 1 mg  Add ALPROSTADIL 20 mcg    PREDNISONE (DELTASONE) 50 MG TAB    Take 50 mg by mouth.    RANOLAZINE (RANEXA) 1,000 MG TB12    TAKE 1 TABLET TWICE DAILY    SEMAGLUTIDE (OZEMPIC) 2 MG/DOSE (8 MG/3 ML) PNIJ    Inject 2 mg into the skin every 7 days.    SERTRALINE (ZOLOFT) 100 MG TABLET    Take 1 tablet (100 mg total) by mouth every evening.    TAMSULOSIN (FLOMAX) 0.4 MG CAP    Take 1 capsule (0.4 mg total) by mouth once daily.    TRAMADOL (ULTRAM) 50 MG TABLET    Take 50 mg by mouth.    TRAZODONE (DESYREL) 100 MG TABLET    TAKE 2 TABLETS EVERY EVENING    VIOS AEROSOL DELIVERY SYSTEM VIN    USE AS DIRESTED         Objective: "     Physical Exam:  Vitals:  /85 (BP Location: Right arm, Patient Position: Sitting)   Pulse 84   Temp 98.2 °F (36.8 °C)   Wt 87.7 kg (193 lb 5.5 oz)   BMI 32.17 kg/m²   Constitutional:       General: No acute distress. Alert, well developed.   HENT:      Head: Normocephalic, no lesions.      Mouth/Throat: No oropharyngeal exudate or posterior oropharyngeal erythema.   Eyes:      General: No scleral icterus.Sclera white, extraocular movements intact.        Right eye: No discharge.         Left eye: No discharge.   Neck: Supple, no palpable nodes, no masses, trachea midline, no thyromegaly.   Cardiovascular:      Rate and Rhythm: Normal rate and regular rhythm.      Heart sounds: Normal heart sounds. No murmur heard.   Pulmonary:      Effort: Pulmonary effort is normal. No respiratory distress.      Breath sounds: Normal breath sounds. No stridor. No rhonchi, or rales. No crackles or wheezing.   Musculoskeletal:         General: No swelling, tenderness, deformity or signs of injury.    Skin:     General: Skin is warm.      Coloration: Skin is not jaundiced or pale.      Findings: No bruising, erythema, or rash.   Neurological:      Alert. Moves all extremities spontaneously  Psychiatric:         Mood is normal. Behavior is normal.        After first Duoneb- wheezing appreciated on lung exam  After second Duoneb- CTA bilaterally on lung exam      Assessment & Plan:   Allergic rhinitis due to [place] house dust mite    Moderate persistent asthma without complication  -     albuterol-ipratropium 2.5 mg-0.5 mg/3 mL nebulizer solution 3 mL  -     albuterol-ipratropium 2.5 mg-0.5 mg/3 mL nebulizer solution 3 mL  -     predniSONE (DELTASONE) 20 MG tablet; Take 1 tablet (20 mg total) by mouth 2 (two) times daily.  Dispense: 10 tablet; Refill: 0    Dependence on supplemental oxygen    Chronic obstructive pulmonary disease, unspecified COPD type    Duoneb given twice today in the office due to wheezing. After  second dose- clear lung exam.  Prednisone ordered    Discussed Xolair. Consent was signed in January 2023. Will schedule for first dose. Encouraged him to start Xolair. He was in agreement.  Continue other medications.      RTC 3-4 months   ELISEO VERAS spent a total of 55 minutes on the day of the visit.This includes face to face time and non-face to face time preparing to see the patient (eg, review of tests), obtaining and/or reviewing separately obtained history, documenting clinical information in the electronic or other health record, independently interpreting results and communicating results to the patient/family/caregiver, or care coordinator.

## 2024-04-19 ENCOUNTER — HOSPITAL ENCOUNTER (OUTPATIENT)
Dept: RADIOLOGY | Facility: HOSPITAL | Age: 69
Discharge: HOME | End: 2024-04-19
Attending: INTERNAL MEDICINE
Payer: MEDICARE

## 2024-04-19 ENCOUNTER — CLINICAL SUPPORT (OUTPATIENT)
Dept: ALLERGY | Facility: CLINIC | Age: 69
End: 2024-04-19
Payer: MEDICARE

## 2024-04-19 ENCOUNTER — HOSPITAL ENCOUNTER (OUTPATIENT)
Dept: CARDIOLOGY | Facility: HOSPITAL | Age: 69
Discharge: HOME | End: 2024-04-19
Attending: INTERNAL MEDICINE
Payer: MEDICARE

## 2024-04-19 ENCOUNTER — OFFICE VISIT (OUTPATIENT)
Dept: CARDIOLOGY | Facility: CLINIC | Age: 69
End: 2024-04-19
Payer: MEDICARE

## 2024-04-19 VITALS
DIASTOLIC BLOOD PRESSURE: 82 MMHG | BODY MASS INDEX: 32.36 KG/M2 | SYSTOLIC BLOOD PRESSURE: 148 MMHG | HEART RATE: 77 BPM | OXYGEN SATURATION: 95 % | HEIGHT: 65 IN | WEIGHT: 194.25 LBS

## 2024-04-19 DIAGNOSIS — I70.0 ATHEROSCLEROSIS OF AORTA: ICD-10-CM

## 2024-04-19 DIAGNOSIS — I48.0 PAF (PAROXYSMAL ATRIAL FIBRILLATION): Chronic | ICD-10-CM

## 2024-04-19 DIAGNOSIS — I50.42 CHRONIC COMBINED SYSTOLIC AND DIASTOLIC HEART FAILURE: Primary | ICD-10-CM

## 2024-04-19 DIAGNOSIS — E78.5 HYPERLIPIDEMIA ASSOCIATED WITH TYPE 2 DIABETES MELLITUS: ICD-10-CM

## 2024-04-19 DIAGNOSIS — E66.9 OBESITY (BMI 30.0-34.9): ICD-10-CM

## 2024-04-19 DIAGNOSIS — R79.89 ELEVATED TROPONIN: ICD-10-CM

## 2024-04-19 DIAGNOSIS — I42.9 CARDIOMYOPATHY, UNSPECIFIED TYPE: ICD-10-CM

## 2024-04-19 DIAGNOSIS — I50.1 CARDIAC ASTHMA: ICD-10-CM

## 2024-04-19 DIAGNOSIS — J45.909 SEVERE ASTHMA WITHOUT COMPLICATION, UNSPECIFIED WHETHER PERSISTENT: Primary | ICD-10-CM

## 2024-04-19 DIAGNOSIS — E11.69 HYPERLIPIDEMIA ASSOCIATED WITH TYPE 2 DIABETES MELLITUS: ICD-10-CM

## 2024-04-19 DIAGNOSIS — E11.3219 TYPE 2 DIABETES MELLITUS WITH MILD NONPROLIFERATIVE RETINOPATHY AND MACULAR EDEMA, WITH LONG-TERM CURRENT USE OF INSULIN, UNSPECIFIED LATERALITY: ICD-10-CM

## 2024-04-19 DIAGNOSIS — R93.1 ELEVATED CORONARY ARTERY CALCIUM SCORE: ICD-10-CM

## 2024-04-19 DIAGNOSIS — I25.118 CORONARY ARTERY DISEASE OF NATIVE ARTERY OF NATIVE HEART WITH STABLE ANGINA PECTORIS: Chronic | ICD-10-CM

## 2024-04-19 DIAGNOSIS — I42.8 NICM (NONISCHEMIC CARDIOMYOPATHY): ICD-10-CM

## 2024-04-19 DIAGNOSIS — I20.1 CORONARY VASOSPASM: ICD-10-CM

## 2024-04-19 DIAGNOSIS — I65.29 OCCLUSION AND STENOSIS OF UNSPECIFIED CAROTID ARTERY: ICD-10-CM

## 2024-04-19 DIAGNOSIS — I27.20 PULMONARY HYPERTENSION: ICD-10-CM

## 2024-04-19 DIAGNOSIS — R07.89 OTHER CHEST PAIN: ICD-10-CM

## 2024-04-19 DIAGNOSIS — I25.110 ATHEROSCLEROSIS OF NATIVE CORONARY ARTERY OF NATIVE HEART WITH UNSTABLE ANGINA PECTORIS: ICD-10-CM

## 2024-04-19 DIAGNOSIS — I65.23 BILATERAL CAROTID ARTERY STENOSIS: ICD-10-CM

## 2024-04-19 DIAGNOSIS — Z79.4 TYPE 2 DIABETES MELLITUS WITH MILD NONPROLIFERATIVE RETINOPATHY AND MACULAR EDEMA, WITH LONG-TERM CURRENT USE OF INSULIN, UNSPECIFIED LATERALITY: ICD-10-CM

## 2024-04-19 DIAGNOSIS — I10 ESSENTIAL HYPERTENSION: ICD-10-CM

## 2024-04-19 LAB
OHS QRS DURATION: 116 MS
OHS QTC CALCULATION: 466 MS

## 2024-04-19 PROCEDURE — 3044F HG A1C LEVEL LT 7.0%: CPT | Mod: CPTII,S$GLB,, | Performed by: INTERNAL MEDICINE

## 2024-04-19 PROCEDURE — 3079F DIAST BP 80-89 MM HG: CPT | Mod: CPTII,S$GLB,, | Performed by: INTERNAL MEDICINE

## 2024-04-19 PROCEDURE — 1101F PT FALLS ASSESS-DOCD LE1/YR: CPT | Mod: CPTII,S$GLB,, | Performed by: INTERNAL MEDICINE

## 2024-04-19 PROCEDURE — 71046 X-RAY EXAM CHEST 2 VIEWS: CPT | Mod: TC

## 2024-04-19 PROCEDURE — 93005 ELECTROCARDIOGRAM TRACING: CPT

## 2024-04-19 PROCEDURE — 3288F FALL RISK ASSESSMENT DOCD: CPT | Mod: CPTII,S$GLB,, | Performed by: INTERNAL MEDICINE

## 2024-04-19 PROCEDURE — 99999 PR PBB SHADOW E&M-EST. PATIENT-LVL V: CPT | Mod: PBBFAC,,, | Performed by: INTERNAL MEDICINE

## 2024-04-19 PROCEDURE — 99215 OFFICE O/P EST HI 40 MIN: CPT | Mod: S$GLB,,, | Performed by: INTERNAL MEDICINE

## 2024-04-19 PROCEDURE — 71046 X-RAY EXAM CHEST 2 VIEWS: CPT | Mod: 26,,, | Performed by: RADIOLOGY

## 2024-04-19 PROCEDURE — 93010 ELECTROCARDIOGRAM REPORT: CPT | Mod: ,,, | Performed by: INTERNAL MEDICINE

## 2024-04-19 PROCEDURE — 1126F AMNT PAIN NOTED NONE PRSNT: CPT | Mod: CPTII,S$GLB,, | Performed by: INTERNAL MEDICINE

## 2024-04-19 PROCEDURE — 3008F BODY MASS INDEX DOCD: CPT | Mod: CPTII,S$GLB,, | Performed by: INTERNAL MEDICINE

## 2024-04-19 PROCEDURE — 1160F RVW MEDS BY RX/DR IN RCRD: CPT | Mod: CPTII,S$GLB,, | Performed by: INTERNAL MEDICINE

## 2024-04-19 PROCEDURE — 99999 PR PBB SHADOW E&M-EST. PATIENT-LVL III: CPT | Mod: PBBFAC,,,

## 2024-04-19 PROCEDURE — 1159F MED LIST DOCD IN RCRD: CPT | Mod: CPTII,S$GLB,, | Performed by: INTERNAL MEDICINE

## 2024-04-19 PROCEDURE — 96372 THER/PROPH/DIAG INJ SC/IM: CPT | Mod: S$GLB,,, | Performed by: ALLERGY & IMMUNOLOGY

## 2024-04-19 PROCEDURE — 3077F SYST BP >= 140 MM HG: CPT | Mod: CPTII,S$GLB,, | Performed by: INTERNAL MEDICINE

## 2024-04-19 NOTE — PROGRESS NOTES
Xolair administered. Patient waited in clinic 120 min for observation. Pt had epi pen on hand.No reaction noted.

## 2024-04-19 NOTE — PROGRESS NOTES
Subjective:   Patient ID:  Crow Green is a 68 y.o. male who presents for follow up of Shortness of Breath and Chest Pain (Chest pain when breathing in )      69 yo AAM, came in for chest pain  PMH CAD nonobstructive and small vessel Dz per LHC done in 11/16, PAF s/p PVI in  by Dr. Brown, CHFpEF 45%, DM, hTN, HLD and obesity. Asthma and SANDRITA on CPAP f/u with Dr. Alicea.    -11/2016, Pt was admitted to [facility] for cough with little sputum for 6 weeks. Had severe left chest pain only with cough and sitting up from the bed. No pain with exercise. Denied palpitation, dizziness, orthopnea, PND and syncope. Troponin up to 0.218 and trending down. Echo showed EF 45% and MPI showed anteroseptal positive.   Subsequent LHC showed d2 30% lesion and otherwise demi Dz.   -03/2017, he was admitted to [facility] for acute cholelithiasis, s/p lap aidan. Pt had PAF during the admisssion and has been on amiodarone and Eliquis. BNP was up to 144 from 44 done five months ago.  -05/2018 at [facility], MPI showed SPECT images at rest and stress studies showed a moderate-sized, moderate anteroseptal perfusion defect and a large, severe inferior wall perfusion defect that are fixed. The gated stress study demonstrated a left ventricular ejection fraction of 35%, and ECHo showed EF 50%. Chest CTA showed no PE.  -2018, had urine drug presumptive cocaine positive but pt declined to use cocaine.   08/2019 afib with rvr during PFT, and [facility] to [facility] and [facility].TROPONIN 0.07 AND FLAT. Converted to sinus after cardizem and BB. F/u with Dr. Brown on 08/15 and advised PVI if recurrent Afib.   echo showed EF 45%.  S/p Afib ablation in  by Dr. Brown  Lives with his dad     visit  05 and  twice admitted for chest pain. EKG no acute stt change. troponin 0.199 and flat.    ECHO EF 40%, MPI no ischemia  C/o cp 3 times a week. Lasted for seconds at rest. With SOB, dizziness.   Pt had quick  chest pain for seconds when he was sitting in the office today.     08/2021 visit  Admitted again in  for chest pain  Drug screen cocaine positive. 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 No headache in PM.   NO recurrent rx. BP low. Visiting nurse helped to prepare the medications for 2 weeks and pt is medication compliant     visit  On scooter for few months due to hip pain and legs weakness  Breathing rx per pulm service.  No leg swelling, chest pain. Dizziness in AM. Med compliance     visit  Left shoulder pain improved after shoulder procedure and rehab  imbalance after the toe removal.   No chest angina pain. No NTG SL prn since nov 2021  SOB controlled by inhaler and breathing rx  No leg swelling. No smoking med compliance  EKG NSR and old anterior infarct    07-22 visit  Progressively more frequent angina, needs twice taking NTG SL. Occurred at rest.  No dyspnea palpitation,   Some dizziness and headachce. LDL 88 and A1c 6.4   echo EF 45% and mod LVH    07/23 visit  Run out of lasix for two weeks. Taking Lisinopril and forgot why not on entresto  BNP was 97 in 05/23 and was at 200 to 300 levels in 2022.   Had headache and blurred vision. Some sob.  Soul and back pain     12/23 visit  Sent to ER after last visit.few ER visits at [facility] ER. Ct showed no aorta aneurysm in chest and abd. Cocaine +  05/23 echo showed EF 50% at [facility]   Remains intermittent chest pain. Stable SOB. No palpitation faint   F/u at pain clinic and now cocaine clean   in 07/23    Interval history  C/o lower left chest pain for 1 week, worse with deep breathing. +tenderness.  Some chest congestion. on home O2 at night.  Caroid US showed right 80 to 90% lesions. Not receive neck cta and vascular surgery consult          Past Medical History:   Diagnosis Date    Arthritis     Asthma     Atrial fibrillation      Atrial fibrillation with RVR 08/07/2019    Carotid artery stenosis     CHF (congestive heart failure)     Chronic obstructive pulmonary disease 08/05/2020    Depression     Dermatomyositis     Diabetes mellitus, type 2 Diagnosed in 2000    Elevated coronary artery calcium score 02/14/2024    Elevated PSA     Follow up 07/30/2020    Hypertension     Myocardial infarction     2017    Sleep apnea        Past Surgical History:   Procedure Laterality Date    ABLATION OF ARRHYTHMOGENIC FOCUS FOR ATRIAL FIBRILLATION N/A 2/27/2020    Procedure: Ablation atrial fibrillation;  Surgeon: Gio Brown MD;  Location: [facility] EP LAB;  Service: Cardiology;  Laterality: N/A;  afib, DAMIEN (cx if SR), PVI, PITO, anes, MB, 3 Prep    CHOLECYSTECTOMY      CLOSURE OF WOUND Left 7/1/2020    Procedure: CLOSURE, WOUND;  Surgeon: Barb Veras DPM;  Location: [facility] OR;  Service: Podiatry;  Laterality: Left;    COLONOSCOPY N/A 3/4/2022    Procedure: COLONOSCOPY;  Surgeon: Yolis Freitas MD;  Location: [facility] ENDO;  Service: Endoscopy;  Laterality: N/A;    ESOPHAGOGASTRODUODENOSCOPY N/A 3/4/2022    Procedure: EGD (ESOPHAGOGASTRODUODENOSCOPY);  Surgeon: Yolis Freitas MD;  Location: [facility] ENDO;  Service: Endoscopy;  Laterality: N/A;    INJECTION OF ANESTHETIC AGENT INTO SACROILIAC JOINT Left 3/24/2021    Procedure: Left BLOCK, SACROILIAC JOINT and bilateral rhomboid TPI;  Surgeon: Dillan Tsai MD;  Location: [facility] PAIN MGT;  Service: Pain Management;  Laterality: Left;    INTRALUMINAL GASTROINTESTINAL TRACT IMAGING VIA CAPSULE N/A 3/22/2022    Procedure: IMAGING PROCEDURE, GI TRACT, INTRALUMINAL, VIA CAPSULE;  Surgeon: Justice Martinez RN;  Location: [facility] ENDO;  Service: Endoscopy;  Laterality: N/A;    REVERSE TOTAL SHOULDER ARTHROPLASTY Left 1/10/2022    Procedure: ARTHROPLASTY, SHOULDER, TOTAL, REVERSE;  Surgeon: Anthony Alvarado MD;  Location: [facility] OR;  Service: Orthopedics;  Laterality: Left;  left reverse total shoulder  arthroplasty     SELECTIVE INJECTION OF ANESTHETIC AGENT AROUND LUMBAR SPINAL NERVE ROOT BY TRANSFORAMINAL APPROACH Left 6/11/2020    Procedure: BLOCK, SPINAL NERVE ROOT, LUMBAR, SELECTIVE, TRANSFORAMINAL APPROACH Left L4-5, L5-S1 TFESI with RN IV sedation;  Surgeon: Dillan Tsai MD;  [facility];  Service: Pain Management;  Laterality: Left;    TOE AMPUTATION Left 6/29/2020    Procedure: AMPUTATION, TOE;  Surgeon: Barb Veras DPM;  Location: [facility] OR;  Service: Podiatry;  Laterality: Left;  great toe       Social History     Tobacco Use    Smoking status: Never    Smokeless tobacco: Never   Substance Use Topics    Alcohol use: Yes     Comment: rare, maybe holidays    Drug use: Not Currently     Types: Cocaine       Family History   Problem Relation Name Age of Onset    Hypertension Mother      Glaucoma Mother      Cataracts Mother      Diabetes Mother      Cataracts Father      Stroke Father      Glaucoma Sister 4         x3    Cataracts Sister 4         x3    Diabetes Sister 4         x1    Stroke Sister 4         x1    No Known Problems Brother 2     No Known Problems Daughter 2     No Known Problems Son 3     Glaucoma Maternal Aunt      Diabetes Maternal Aunt      Cancer Maternal Grandfather          lung (smoker)    Prostate cancer Neg Hx      Heart disease Neg Hx      Kidney disease Neg Hx           ROS    Objective:   Physical Exam  HENT:      Head: Normocephalic.   Eyes:      Pupils: Pupils are equal, round, and reactive to light.   Neck:      Thyroid: No thyromegaly.      Vascular: Normal carotid pulses. No carotid bruit or JVD.   Cardiovascular:      Rate and Rhythm: Normal rate and regular rhythm. No extrasystoles are present.     Chest Wall: PMI is not displaced.      Pulses: Normal pulses.      Heart sounds: Normal heart sounds. No murmur heard.     No gallop. No S3 sounds.   Pulmonary:      Effort: No respiratory distress.      Breath sounds: Normal breath sounds. No stridor.    Abdominal:      General: Bowel sounds are normal.      Palpations: Abdomen is soft.      Tenderness: There is no abdominal tenderness. There is no rebound.   Musculoskeletal:      Comments:      Skin:     Findings: No rash.   Neurological:      Mental Status: He is alert and oriented to person, place, and time.   Psychiatric:         Behavior: Behavior normal.         Lab Results   Component Value Date    CHOL 117 07/08/2023    CHOL 103 05/23/2023    CHOL 152 06/10/2022     Lab Results   Component Value Date    HDL 36 (L) 07/08/2023    HDL 41 05/23/2023    HDL 43 06/10/2022     Lab Results   Component Value Date    LDLCALC 62 07/08/2023    LDLCALC 46 05/23/2023    LDLCALC 88.8 06/10/2022     Lab Results   Component Value Date    TRIG 97 07/08/2023    TRIG 80 05/23/2023    TRIG 101 06/10/2022     Lab Results   Component Value Date    CHOLHDL 28.3 06/10/2022    CHOLHDL 26.7 05/25/2021    CHOLHDL 27.1 05/24/2021       Chemistry        Component Value Date/Time     10/30/2023 1250     10/30/2023 1250    K 3.9 10/30/2023 1250    K 3.9 10/30/2023 1250     10/30/2023 1250     10/30/2023 1250    CO2 28 10/30/2023 1250    CO2 28 10/30/2023 1250    BUN 16 10/30/2023 1250    BUN 16 10/30/2023 1250    CREATININE 1.2 10/30/2023 1250    CREATININE 1.2 10/30/2023 1250     (H) 10/30/2023 1250     (H) 10/30/2023 1250        Component Value Date/Time    CALCIUM 9.5 10/30/2023 1250    CALCIUM 9.5 10/30/2023 1250    ALKPHOS 79 10/30/2023 1250    AST 21 10/30/2023 1250    ALT 15 10/30/2023 1250    BILITOT 0.2 10/30/2023 1250    ESTGFRAFRICA 43 07/28/2023 0438    ESTGFRAFRICA >60 07/31/2022 0634    EGFRNONAA >60 07/31/2022 0634          Lab Results   Component Value Date    HGBA1C 5.7 (H) 04/10/2024     Lab Results   Component Value Date    TSH 0.881 05/23/2023     Lab Results   Component Value Date    INR 1.2 07/07/2023    INR 1.4 05/22/2023    INR 1.0 11/07/2022     Lab Results   Component Value  Date    WBC 8.00 03/31/2023    HGB 11.2 (L) 03/31/2023    HCT 35.0 (L) 03/31/2023    MCV 80 (L) 03/31/2023     03/31/2023     BMP  Sodium   Date Value Ref Range Status   10/30/2023 143 136 - 145 mmol/L Final   10/30/2023 143 136 - 145 mmol/L Final     Potassium   Date Value Ref Range Status   10/30/2023 3.9 3.5 - 5.1 mmol/L Final   10/30/2023 3.9 3.5 - 5.1 mmol/L Final     Chloride   Date Value Ref Range Status   10/30/2023 104 95 - 110 mmol/L Final   10/30/2023 104 95 - 110 mmol/L Final     CO2   Date Value Ref Range Status   10/30/2023 28 23 - 29 mmol/L Final   10/30/2023 28 23 - 29 mmol/L Final     BUN   Date Value Ref Range Status   10/30/2023 16 8 - 23 mg/dL Final   10/30/2023 16 8 - 23 mg/dL Final     Creatinine   Date Value Ref Range Status   10/30/2023 1.2 0.5 - 1.4 mg/dL Final   10/30/2023 1.2 0.5 - 1.4 mg/dL Final     Calcium   Date Value Ref Range Status   10/30/2023 9.5 8.7 - 10.5 mg/dL Final   10/30/2023 9.5 8.7 - 10.5 mg/dL Final     Anion Gap   Date Value Ref Range Status   10/30/2023 11 8 - 16 mmol/L Final   10/30/2023 11 8 - 16 mmol/L Final     eGFR if    Date Value Ref Range Status   07/31/2022 >60 >60 mL/min/1.73 m^2 Final     eGFR    Date Value Ref Range Status   07/28/2023 43 mL/min/1.73mSq Final     Comment:     In accordance with NKF-ASN Task Force recommendation, calculation based on the Chronic Kidney Disease Epidemiology Collaboration (CKD-EPI) equation without adjustment for race. eGFR adjusted for gender and age and calculated in ml/min/1.73mSquared. eGFR cannot be calculated if patient is under 18 years of age.     Reference Range:   >= 60 ml/min/1.73mSquared.     eGFR if non    Date Value Ref Range Status   07/31/2022 >60 >60 mL/min/1.73 m^2 Final     Comment:     Calculation used to obtain the estimated glomerular filtration  rate (eGFR) is the CKD-EPI equation.         BNP  @LABRCNTIP(BNP,BNPTRIAGEBLO)@  @LABRCNTIP(troponini)@  CrCl cannot be calculated (Patient's most recent lab result is older than the maximum 7 days allowed.).  No results found in the last 24 hours.  No results found in the last 24 hours.  No results found in the last 24 hours.    Assessment:      1. Chronic combined systolic and diastolic heart failure    2. Atherosclerosis of native coronary artery of native heart with unstable angina pectoris    3. Atherosclerosis of aorta    4. Bilateral carotid artery stenosis    5. Cardiac asthma    6. Cardiomyopathy, unspecified type    7. NICM (nonischemic cardiomyopathy)    8. Hyperlipidemia associated with type 2 diabetes mellitus    9. Elevated coronary artery calcium score    10. Coronary vasospasm    11. Elevated troponin    12. Essential hypertension    13. Nonobstructive coronary artery disease of native artery of native heart    14. PAF (paroxysmal atrial fibrillation)    15. Pulmonary hypertension- echo 7/2022    16. 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 Obesity (BMI 30.0-34.9)    18. Other chest pain    19. Occlusion and stenosis of unspecified carotid artery      LDL 69 and BP high chest pain     Plan:   CXR and BNP for chest pain and SOB  Re order neck CTA and refer to abilio kerns for carotid  Continue lipitor amlodipine eliquis jardiance lasix lisinopril ranexa   Rtc as scheduled

## 2024-04-21 ENCOUNTER — TELEPHONE (OUTPATIENT)
Dept: CARDIOLOGY | Facility: CLINIC | Age: 69
End: 2024-04-21
Payer: MEDICARE

## 2024-04-21 DIAGNOSIS — I50.42 CHRONIC COMBINED SYSTOLIC AND DIASTOLIC CONGESTIVE HEART FAILURE: ICD-10-CM

## 2024-04-21 RX ORDER — FUROSEMIDE 40 MG/1
100 TABLET ORAL 2 TIMES DAILY
Qty: 150 TABLET | Refills: 10 | Status: SHIPPED | OUTPATIENT
Start: 2024-04-21

## 2024-04-22 ENCOUNTER — TELEPHONE (OUTPATIENT)
Dept: CARDIOLOGY | Facility: CLINIC | Age: 69
End: 2024-04-22
Payer: MEDICARE

## 2024-04-26 ENCOUNTER — TELEPHONE (OUTPATIENT)
Dept: ALLERGY | Facility: CLINIC | Age: 69
End: 2024-04-26
Payer: MEDICARE

## 2024-04-26 ENCOUNTER — OFFICE VISIT (OUTPATIENT)
Dept: INTERNAL MEDICINE | Facility: CLINIC | Age: 69
End: 2024-04-26
Payer: MEDICARE

## 2024-04-26 VITALS
BODY MASS INDEX: 32.51 KG/M2 | OXYGEN SATURATION: 96 % | HEART RATE: 76 BPM | WEIGHT: 195.13 LBS | HEIGHT: 65 IN | SYSTOLIC BLOOD PRESSURE: 138 MMHG | DIASTOLIC BLOOD PRESSURE: 70 MMHG | TEMPERATURE: 97 F

## 2024-04-26 DIAGNOSIS — J44.9 CHRONIC OBSTRUCTIVE PULMONARY DISEASE, UNSPECIFIED COPD TYPE: ICD-10-CM

## 2024-04-26 DIAGNOSIS — M1A.09X0 CHRONIC GOUT OF MULTIPLE SITES, UNSPECIFIED CAUSE: ICD-10-CM

## 2024-04-26 DIAGNOSIS — J45.40 MODERATE PERSISTENT ASTHMA WITHOUT COMPLICATION: ICD-10-CM

## 2024-04-26 DIAGNOSIS — E11.621 DIABETIC ULCER OF TOE OF LEFT FOOT ASSOCIATED WITH TYPE 2 DIABETES MELLITUS, WITH NECROSIS OF MUSCLE: ICD-10-CM

## 2024-04-26 DIAGNOSIS — I50.42 CHRONIC COMBINED SYSTOLIC AND DIASTOLIC HEART FAILURE: ICD-10-CM

## 2024-04-26 DIAGNOSIS — L97.523 DIABETIC ULCER OF TOE OF LEFT FOOT ASSOCIATED WITH TYPE 2 DIABETES MELLITUS, WITH NECROSIS OF MUSCLE: ICD-10-CM

## 2024-04-26 DIAGNOSIS — I50.1 CARDIAC ASTHMA: ICD-10-CM

## 2024-04-26 DIAGNOSIS — M47.26 OSTEOARTHRITIS OF SPINE WITH RADICULOPATHY, LUMBAR REGION: Primary | ICD-10-CM

## 2024-04-26 DIAGNOSIS — J45.40 MODERATE PERSISTENT EXTRINSIC ASTHMA WITHOUT COMPLICATION: ICD-10-CM

## 2024-04-26 PROCEDURE — 99999 PR PBB SHADOW E&M-EST. PATIENT-LVL III: CPT | Mod: PBBFAC,,, | Performed by: NURSE PRACTITIONER

## 2024-04-26 PROCEDURE — 3288F FALL RISK ASSESSMENT DOCD: CPT | Mod: CPTII,S$GLB,, | Performed by: NURSE PRACTITIONER

## 2024-04-26 PROCEDURE — 3078F DIAST BP <80 MM HG: CPT | Mod: CPTII,S$GLB,, | Performed by: NURSE PRACTITIONER

## 2024-04-26 PROCEDURE — 1125F AMNT PAIN NOTED PAIN PRSNT: CPT | Mod: CPTII,S$GLB,, | Performed by: NURSE PRACTITIONER

## 2024-04-26 PROCEDURE — 3008F BODY MASS INDEX DOCD: CPT | Mod: CPTII,S$GLB,, | Performed by: NURSE PRACTITIONER

## 2024-04-26 PROCEDURE — 3044F HG A1C LEVEL LT 7.0%: CPT | Mod: CPTII,S$GLB,, | Performed by: NURSE PRACTITIONER

## 2024-04-26 PROCEDURE — 99213 OFFICE O/P EST LOW 20 MIN: CPT | Mod: S$GLB,,, | Performed by: NURSE PRACTITIONER

## 2024-04-26 PROCEDURE — 1100F PTFALLS ASSESS-DOCD GE2>/YR: CPT | Mod: CPTII,S$GLB,, | Performed by: NURSE PRACTITIONER

## 2024-04-26 PROCEDURE — 3075F SYST BP GE 130 - 139MM HG: CPT | Mod: CPTII,S$GLB,, | Performed by: NURSE PRACTITIONER

## 2024-04-26 NOTE — PROGRESS NOTES
Subjective:       Patient ID: Crow Green is a 68 y.o. male.    Chief Complaint: Pain (Foot pain) and Back Pain (For over 6 months)    HPI    Pt presents with chronic pain complaint-ongoing. Missed pain mgt appt this mo.has another appt scheduled 5/22  Foot pain- on gabapentin. Just saw podiatry  Chronic sob, has chf,asthma/copd, allergic asthma-this mo has seen pulm allergy and cards- max therapy reached per pulm, placed on steroids per allergy, lasix increased due to mild chf exacerbation per cards        Past Medical History:   Diagnosis Date    Arthritis     Asthma     Atrial fibrillation     Atrial fibrillation with RVR 08/07/2019    Carotid artery stenosis     CHF (congestive heart failure)     Chronic obstructive pulmonary disease 08/05/2020    Depression     Dermatomyositis     Diabetes mellitus, type 2 Diagnosed in 2000    Elevated coronary artery calcium score 02/14/2024    Elevated PSA     Follow up 07/30/2020    Hypertension     Myocardial infarction     2017    Sleep apnea      Past Surgical History:   Procedure Laterality Date    ABLATION OF ARRHYTHMOGENIC FOCUS FOR ATRIAL FIBRILLATION N/A 2/27/2020    Procedure: Ablation atrial fibrillation;  Surgeon: Gio Brown MD;  Location: [facility] EP LAB;  Service: Cardiology;  Laterality: N/A;  afib, DAMIEN (cx if SR), PVI, PITO, anes, MB, 3 Prep    CHOLECYSTECTOMY      CLOSURE OF WOUND Left 7/1/2020    Procedure: CLOSURE, WOUND;  Surgeon: Barb Veras DPM;  Location: [facility] OR;  Service: Podiatry;  Laterality: Left;    COLONOSCOPY N/A 3/4/2022    Procedure: COLONOSCOPY;  Surgeon: Yolis Freitas MD;  Location: [facility] ENDO;  Service: Endoscopy;  Laterality: N/A;    ESOPHAGOGASTRODUODENOSCOPY N/A 3/4/2022    Procedure: EGD (ESOPHAGOGASTRODUODENOSCOPY);  Surgeon: Yolis Freitas MD;  Location: [facility] ENDO;  Service: Endoscopy;  Laterality: N/A;    INJECTION OF ANESTHETIC AGENT INTO SACROILIAC JOINT Left 3/24/2021    Procedure: Left BLOCK,  SACROILIAC JOINT and bilateral rhomboid TPI;  Surgeon: Dillan Tsai MD;  Location: [facility] PAIN MGT;  Service: Pain Management;  Laterality: Left;    INTRALUMINAL GASTROINTESTINAL TRACT IMAGING VIA CAPSULE N/A 3/22/2022    Procedure: IMAGING PROCEDURE, GI TRACT, INTRALUMINAL, VIA CAPSULE;  Surgeon: Justice Martinez RN;  Location: [facility] ENDO;  Service: Endoscopy;  Laterality: N/A;    REVERSE TOTAL SHOULDER ARTHROPLASTY Left 1/10/2022    Procedure: ARTHROPLASTY, SHOULDER, TOTAL, REVERSE;  Surgeon: Anthony Alvarado MD;  Location: [facility] OR;  Service: Orthopedics;  Laterality: Left;  left reverse total shoulder arthroplasty     SELECTIVE INJECTION OF ANESTHETIC AGENT AROUND LUMBAR SPINAL NERVE ROOT BY TRANSFORAMINAL APPROACH Left 6/11/2020    Procedure: BLOCK, SPINAL NERVE ROOT, LUMBAR, SELECTIVE, TRANSFORAMINAL APPROACH Left L4-5, L5-S1 TFESI with RN IV sedation;  Surgeon: Dillan Tsai MD;  Location: [facility] PAIN MGT;  Service: Pain Management;  Laterality: Left;    TOE AMPUTATION Left 6/29/2020    Procedure: AMPUTATION, TOE;  Surgeon: Barb Veras DPM;  Location: [facility] OR;  Service: Podiatry;  Laterality: Left;  great toe     Social History     Socioeconomic History    Marital status: Legally     Number of children: 5   Occupational History    Occupation: disabled    Occupation: PT at [employer]    Tobacco Use    Smoking status: Never    Smokeless tobacco: Never   Substance and Sexual Activity    Alcohol use: Yes     Comment: rare, maybe holidays    Drug use: Not Currently     Types: Cocaine    Sexual activity: Not Currently     Partners: Female   Social History Narrative    Utilizing [employer] transportation which has been unreliable.      Social Determinants of Health     Financial Resource Strain: Low Risk  (7/25/2023)    Received from [facility] and Its Subsidiaries and Affiliates    Overall Financial Resource Strain (CARDIA)     Difficulty of Paying  Living Expenses: Not hard at all   Food Insecurity: No Food Insecurity (7/25/2023)    Received from [facility] Hospital[facility] and Affiliates    Hunger Vital Sign     Worried About Running Out of Food in the Last Year: Never true     Ran Out of Food in the Last Year: Never true   Transportation Needs: No Transportation Needs (7/25/2023)    Received from [facility] and Its SubsidPage Hospitalies and Affiliates    PRAPARE - Transportation     Lack of Transportation (Medical): No     Lack of Transportation (Non-Medical): No   Physical Activity: Inactive (12/7/2022)    Exercise Vital Sign     Days of Exercise per Week: 0 days     Minutes of Exercise per Session: 0 min   Stress: No Stress Concern Present (7/25/2023)    Received from [facility] and Its [facility]sid[facility] Hospitalies and Affiliates    [employer] [place] [employer] - Occupational Stress Questionnaire     Feeling of Stress : Not at all   Social Connections: Moderately Isolated (7/25/2023)    [facility]    Social Connection and Isolation Panel [NHANES]     Frequency of Communication with Friends and Family: More than three times a week     Frequency of Social Gatherings with Friends and Family: More than three times a week     Attends Protestant Services: 1 to 4 times per year     Active Member of Clubs or Organizations: No     Attends Club or Organization Meetings: Never     Marital Status:    Housing Stability: Unknown (7/25/2023)    Received from [facility] and [facility] and Affiliates    Housing Stability Vital Sign     Unable to Pay for Housing in the Last Year: No     Review of patient's allergies indicates:   Allergen Reactions    Protamine Hives     Urticaria, possible upper airway swelling     Current  Outpatient Medications   Medication Sig Dispense Refill    albuterol (PROVENTIL) 2.5 mg /3 mL (0.083 %) nebulizer solution INHALE THE CONTENTS OF 1 VIAL VIA NEBULIZER EVERY 4 TO 6 HOURS AS NEEDED FOR WHEEZING OR FOR SHORTNESS OF BREATH 1080 mL 3    allopurinoL (ZYLOPRIM) 100 MG tablet Take 1 tablet (100 mg total) by mouth once daily. 90 tablet 3    amLODIPine (NORVASC) 2.5 MG tablet Take 1 tablet (2.5 mg total) by mouth once daily. 90 tablet 3    atorvastatin (LIPITOR) 40 MG tablet Take 1 tablet (40 mg total) by mouth every evening. 90 tablet 3    blood sugar diagnostic (TRUE METRIX GLUCOSE TEST STRIP) Strp Inject 1 each into the skin 4 (four) times daily. 150 strip 1    blood-glucose meter Misc Use as instructed to test blood glucose meter daily. 1 each 0    brinzolamide-brimonidine (SIMBRINZA) 1-0.2 % DrpS Place 1 drop into both eyes 3 (three) times daily. 8 mL 6    cyanocobalamin (VITAMIN B-12) 1000 MCG tablet Take 1 tablet by mouth once daily.      DEXCOM G7 SENSOR Shefali Change sensor every 10 days 3 each 11    ELIQUIS 5 mg Tab TAKE 1 TABLET TWICE DAILY 180 tablet 3    empagliflozin (JARDIANCE) 25 mg tablet Take 1 tablet (25 mg total) by mouth once daily. 90 tablet 3    fluticasone-umeclidin-vilanter (TRELEGY ELLIPTA) 200-62.5-25 mcg inhaler Inhale 1 puff into the lungs once daily. 360 each 5    furosemide (LASIX) 40 MG tablet Take 2.5 tablets (100 mg total) by mouth 2 (two) times a day. (Patient taking differently: Take 120 mg by mouth 2 (two) times a day.) 150 tablet 10    gabapentin (NEURONTIN) 800 MG tablet Take 1 tablet (800 mg total) by mouth 3 (three) times daily. 270 tablet 4    insulin aspart U-100 (NOVOLOG FLEXPEN U-100 INSULIN) 100 unit/mL (3 mL) InPn pen INJECT 10 UNITS UNDER THE SKIN THREE TIMES DAILY WITH MEALS 15 mL 10    INV allopurinol tablet Take 1 tablet by mouth once daily.      lancets (TRUEPLUS LANCETS) 33 gauge Misc Use to check blood sugar twice a day 100 each 6    lancets (TRUEPLUS  "LANCETS) 33 gauge Misc USE AS DIRECTED TO TEST BLOOD SUGAR FOUR TIMES DAILY 200 each 5    latanoprost 0.005 % ophthalmic solution INSTILL 1 DROP INTO BOTH EYES ONE TIME DAILY BOTTLE EXPIRES 42 DAYS AFTER OPENING... 7.5 mL 3    lisinopriL (PRINIVIL,ZESTRIL) 30 MG tablet TAKE 1 TABLET EVERY DAY. 90 tablet 3    multivitamin-iron-folic acid (SENTRY) Tab Take 1 tablet by mouth once daily.      omalizumab (XOLAIR) 150 mg/mL injection Inject 2 mLs (300 mg total) into the skin every 14 (fourteen) days. 4 each 11    omalizumab (XOLAIR) 75 mg/0.5 mL injection Inject 0.5 mLs (75 mg total) into the skin every 14 (fourteen) days. 2 each 11    omeprazole (PRILOSEC) 20 MG capsule Take 20 mg by mouth every morning.      pantoprazole (PROTONIX) 40 MG tablet Take 1 tablet (40 mg total) by mouth once daily. 90 tablet 1    pen needle, diabetic (BD ULTRA-FINE SHORT PEN NEEDLE) 31 gauge x 5/16" Ndle 1 each by Misc.(Non-Drug; Combo Route) route 3 (three) times daily. 300 each 3    pep injection Inject 0.15 ml as directed     For compounding pharmacy use:   Add PAPAVERINE 30 mg  Add PHENTOLAMINE 1 mg  Add ALPROSTADIL 20 mcg 1 vial 5    ranolazine (RANEXA) 1,000 mg Tb12 TAKE 1 TABLET TWICE DAILY 180 tablet 2    semaglutide (OZEMPIC) 2 mg/dose (8 mg/3 mL) PnIj Inject 2 mg into the skin every 7 days. 9 mL 3    sertraline (ZOLOFT) 100 MG tablet Take 1 tablet (100 mg total) by mouth every evening. 90 tablet 4    traZODone (DESYREL) 100 MG tablet TAKE 2 TABLETS EVERY EVENING 180 tablet 3    VIOS AEROSOL DELIVERY SYSTEM Shefali USE AS DIRESTED  0    EPINEPHrine (EPIPEN) 0.3 mg/0.3 mL AtIn Inject 0.3 mLs (0.3 mg total) into the muscle once. for 1 dose 2 each 3    lisinopriL 10 MG tablet TAKE 1 TABLET EVERY DAY (Patient not taking: Reported on 4/26/2024) 90 tablet 3    naproxen (NAPROSYN) 500 MG tablet Take 1 tablet (500 mg total) by mouth 2 (two) times daily with meals. (Patient not taking: Reported on 4/26/2024) 20 tablet 0    " papaverine-phentolamin-alprost 150 mg-5 mg- 50 mcg SolR 0.5 mLs by Intracavernosal route as needed (ED). (Patient not taking: Reported on 4/26/2024) 10 each ml    predniSONE (DELTASONE) 20 MG tablet Take 1 tablet (20 mg total) by mouth 2 (two) times daily. (Patient not taking: Reported on 4/26/2024) 10 tablet 0    predniSONE (DELTASONE) 50 MG Tab Take 50 mg by mouth. (Patient not taking: Reported on 4/26/2024)      tamsulosin (FLOMAX) 0.4 mg Cap Take 1 capsule (0.4 mg total) by mouth once daily. 90 capsule 1    traMADoL (ULTRAM) 50 mg tablet Take 50 mg by mouth. (Patient not taking: Reported on 4/26/2024)       Current Facility-Administered Medications   Medication Dose Route Frequency Provider Last Rate Last Admin    omalizumab injection 300 mg  300 mg Subcutaneous Q28 Days    300 mg at 04/19/24 1045    omalizumab injection 75 mg  75 mg Subcutaneous Q28 Days    75 mg at 04/19/24 1049           Review of Systems   Constitutional:  Negative for activity change, appetite change, chills, diaphoresis, fatigue, fever and unexpected weight change.   HENT:  Negative for congestion, ear pain, postnasal drip, rhinorrhea, sinus pressure, sinus pain, sneezing, sore throat, tinnitus, trouble swallowing and voice change.    Eyes:  Negative for photophobia, pain and visual disturbance.   Respiratory:  Negative for cough, chest tightness, shortness of breath and wheezing.    Cardiovascular:  Negative for chest pain, palpitations and leg swelling.   Gastrointestinal:  Negative for abdominal distention, abdominal pain, constipation, diarrhea, nausea and vomiting.   Genitourinary:  Negative for decreased urine volume, difficulty urinating, dysuria, flank pain, frequency, hematuria and urgency.   Musculoskeletal:  Positive for arthralgias, back pain, gait problem, myalgias and neck pain. Negative for joint swelling and neck stiffness.   Allergic/Immunologic: Negative for immunocompromised state.   Neurological:  Negative for  dizziness, tremors, seizures, syncope, facial asymmetry, speech difficulty, weakness, light-headedness, numbness and headaches.   Hematological:  Negative for adenopathy. Does not bruise/bleed easily.   Psychiatric/Behavioral:  Negative for confusion and sleep disturbance.        Objective:      Physical Exam  Vitals reviewed.   Cardiovascular:      Rate and Rhythm: Normal rate and regular rhythm.      Heart sounds: Normal heart sounds.   Pulmonary:      Effort: Pulmonary effort is normal.      Breath sounds: Normal breath sounds.   Musculoskeletal:      Right shoulder: Tenderness present. Decreased range of motion.      Lumbar back: Spasms and tenderness present. Decreased range of motion.   Skin:     General: Skin is warm and dry.   Neurological:      Mental Status: He is alert and oriented to person, place, and time.         Assessment:     Vitals:    04/26/24 0937   BP: 138/70   Pulse: 76   Temp: 97 °F (36.1 °C)         1. Osteoarthritis of spine with radiculopathy, lumbar region    2. Diabetic ulcer of toe of left foot associated with type 2 diabetes mellitus, with necrosis of muscle    3. Chronic gout of multiple sites, unspecified cause    4. Cardiac asthma    5. Chronic obstructive pulmonary disease, unspecified COPD type    6. Moderate persistent extrinsic asthma without complication    7. Moderate persistent asthma without complication    8. Chronic combined systolic and diastolic heart failure        Plan:   Osteoarthritis of spine with radiculopathy, lumbar region  -     Ambulatory referral/consult to Home Health; Future; Expected date: 04/27/2024    Diabetic ulcer of toe of left foot associated with type 2 diabetes mellitus, with necrosis of muscle    Chronic gout of multiple sites, unspecified cause    Cardiac asthma    Chronic obstructive pulmonary disease, unspecified COPD type  -     Ambulatory referral/consult to Home Health; Future; Expected date: 04/27/2024    Moderate persistent extrinsic asthma  without complication    Moderate persistent asthma without complication    Chronic combined systolic and diastolic heart failure  -     Ambulatory referral/consult to Home Health; Future; Expected date: 04/27/2024      Discussed and reviewed upcoming appts, previous appts, medications  Keep appt next week for routine care and all specialty appts

## 2024-04-26 NOTE — TELEPHONE ENCOUNTER
----- Message from Megan Dean sent at 4/26/2024 12:11 PM CDT -----  Contact: Crow  .Patient is calling to speak with the nurse regarding questions and concerns  . Reports wanting to speak with someone about pharmacy  . Please give patient a call back at   [phone]

## 2024-04-30 ENCOUNTER — LAB VISIT (OUTPATIENT)
Dept: LAB | Facility: HOSPITAL | Age: 69
End: 2024-04-30
Attending: INTERNAL MEDICINE
Payer: MEDICARE

## 2024-04-30 ENCOUNTER — TELEPHONE (OUTPATIENT)
Dept: ALLERGY | Facility: CLINIC | Age: 69
End: 2024-04-30
Payer: MEDICARE

## 2024-04-30 ENCOUNTER — PATIENT MESSAGE (OUTPATIENT)
Dept: ALLERGY | Facility: CLINIC | Age: 69
End: 2024-04-30
Payer: MEDICARE

## 2024-04-30 ENCOUNTER — OFFICE VISIT (OUTPATIENT)
Dept: INTERNAL MEDICINE | Facility: CLINIC | Age: 69
End: 2024-04-30
Payer: MEDICARE

## 2024-04-30 VITALS
SYSTOLIC BLOOD PRESSURE: 138 MMHG | DIASTOLIC BLOOD PRESSURE: 74 MMHG | BODY MASS INDEX: 31.51 KG/M2 | HEART RATE: 83 BPM | OXYGEN SATURATION: 99 % | TEMPERATURE: 98 F | HEIGHT: 65 IN | WEIGHT: 189.13 LBS

## 2024-04-30 DIAGNOSIS — K21.9 GASTROESOPHAGEAL REFLUX DISEASE WITHOUT ESOPHAGITIS: ICD-10-CM

## 2024-04-30 DIAGNOSIS — Z89.412 ACQUIRED ABSENCE OF LEFT GREAT TOE: ICD-10-CM

## 2024-04-30 DIAGNOSIS — M85.89 OSTEOPENIA OF MULTIPLE SITES: ICD-10-CM

## 2024-04-30 DIAGNOSIS — F33.1 MDD (MAJOR DEPRESSIVE DISORDER), RECURRENT EPISODE, MODERATE: ICD-10-CM

## 2024-04-30 DIAGNOSIS — I65.23 BILATERAL CAROTID ARTERY STENOSIS: ICD-10-CM

## 2024-04-30 DIAGNOSIS — F41.1 GAD (GENERALIZED ANXIETY DISORDER): ICD-10-CM

## 2024-04-30 DIAGNOSIS — G47.33 OSA (OBSTRUCTIVE SLEEP APNEA): ICD-10-CM

## 2024-04-30 DIAGNOSIS — Z79.4 TYPE 2 DIABETES MELLITUS WITH MILD NONPROLIFERATIVE RETINOPATHY AND MACULAR EDEMA, WITH LONG-TERM CURRENT USE OF INSULIN, UNSPECIFIED LATERALITY: ICD-10-CM

## 2024-04-30 DIAGNOSIS — I50.42 CHRONIC COMBINED SYSTOLIC AND DIASTOLIC HEART FAILURE: ICD-10-CM

## 2024-04-30 DIAGNOSIS — E11.42 DIABETIC POLYNEUROPATHY ASSOCIATED WITH TYPE 2 DIABETES MELLITUS: ICD-10-CM

## 2024-04-30 DIAGNOSIS — I50.42 CHRONIC COMBINED SYSTOLIC AND DIASTOLIC CONGESTIVE HEART FAILURE: ICD-10-CM

## 2024-04-30 DIAGNOSIS — Z91.148 MEDICATION NONCOMPLIANCE DUE TO COGNITIVE IMPAIRMENT: ICD-10-CM

## 2024-04-30 DIAGNOSIS — R51.9 CHRONIC DAILY HEADACHE: Primary | ICD-10-CM

## 2024-04-30 DIAGNOSIS — Z12.5 SCREENING FOR PROSTATE CANCER: ICD-10-CM

## 2024-04-30 DIAGNOSIS — Z99.81 DEPENDENCE ON SUPPLEMENTAL OXYGEN: ICD-10-CM

## 2024-04-30 DIAGNOSIS — I48.0 PAF (PAROXYSMAL ATRIAL FIBRILLATION): Chronic | ICD-10-CM

## 2024-04-30 DIAGNOSIS — R13.10 DYSPHAGIA, UNSPECIFIED TYPE: ICD-10-CM

## 2024-04-30 DIAGNOSIS — R26.9 ABNORMALITY OF GAIT AND MOBILITY: ICD-10-CM

## 2024-04-30 DIAGNOSIS — M33.13 DERMATOMYOSITIS: ICD-10-CM

## 2024-04-30 DIAGNOSIS — R41.9 MEDICATION NONCOMPLIANCE DUE TO COGNITIVE IMPAIRMENT: ICD-10-CM

## 2024-04-30 DIAGNOSIS — E66.9 OBESITY (BMI 30.0-34.9): ICD-10-CM

## 2024-04-30 DIAGNOSIS — E78.5 HYPERLIPIDEMIA ASSOCIATED WITH TYPE 2 DIABETES MELLITUS: ICD-10-CM

## 2024-04-30 DIAGNOSIS — E11.3312 MODERATE NONPROLIFERATIVE DIABETIC RETINOPATHY OF LEFT EYE WITH MACULAR EDEMA ASSOCIATED WITH TYPE 2 DIABETES MELLITUS: ICD-10-CM

## 2024-04-30 DIAGNOSIS — E11.69 HYPERLIPIDEMIA ASSOCIATED WITH TYPE 2 DIABETES MELLITUS: ICD-10-CM

## 2024-04-30 DIAGNOSIS — I25.118 CORONARY ARTERY DISEASE OF NATIVE ARTERY OF NATIVE HEART WITH STABLE ANGINA PECTORIS: Chronic | ICD-10-CM

## 2024-04-30 DIAGNOSIS — D84.9 IMMUNOCOMPROMISED PATIENT: ICD-10-CM

## 2024-04-30 DIAGNOSIS — I70.0 ATHEROSCLEROSIS OF AORTA: ICD-10-CM

## 2024-04-30 DIAGNOSIS — R93.1 ELEVATED CORONARY ARTERY CALCIUM SCORE: ICD-10-CM

## 2024-04-30 DIAGNOSIS — F25.1 SCHIZOAFFECTIVE DISORDER, DEPRESSIVE TYPE: ICD-10-CM

## 2024-04-30 DIAGNOSIS — E11.3219 TYPE 2 DIABETES MELLITUS WITH MILD NONPROLIFERATIVE RETINOPATHY AND MACULAR EDEMA, WITH LONG-TERM CURRENT USE OF INSULIN, UNSPECIFIED LATERALITY: ICD-10-CM

## 2024-04-30 DIAGNOSIS — M1A.09X0 CHRONIC GOUT OF MULTIPLE SITES, UNSPECIFIED CAUSE: ICD-10-CM

## 2024-04-30 LAB
ANION GAP SERPL CALC-SCNC: 11 MMOL/L (ref 8–16)
BNP SERPL-MCNC: 263 PG/ML (ref 0–99)
BUN SERPL-MCNC: 18 MG/DL (ref 8–23)
CALCIUM SERPL-MCNC: 9.1 MG/DL (ref 8.7–10.5)
CHLORIDE SERPL-SCNC: 101 MMOL/L (ref 95–110)
CO2 SERPL-SCNC: 28 MMOL/L (ref 23–29)
CREAT SERPL-MCNC: 1 MG/DL (ref 0.5–1.4)
EST. GFR  (NO RACE VARIABLE): >60 ML/MIN/1.73 M^2
GLUCOSE SERPL-MCNC: 56 MG/DL (ref 70–110)
POTASSIUM SERPL-SCNC: 4.1 MMOL/L (ref 3.5–5.1)
SODIUM SERPL-SCNC: 140 MMOL/L (ref 136–145)

## 2024-04-30 PROCEDURE — 3075F SYST BP GE 130 - 139MM HG: CPT | Mod: CPTII,S$GLB,, | Performed by: PHYSICIAN ASSISTANT

## 2024-04-30 PROCEDURE — 99214 OFFICE O/P EST MOD 30 MIN: CPT | Mod: S$GLB,,, | Performed by: PHYSICIAN ASSISTANT

## 2024-04-30 PROCEDURE — G2211 COMPLEX E/M VISIT ADD ON: HCPCS | Mod: S$GLB,,, | Performed by: PHYSICIAN ASSISTANT

## 2024-04-30 PROCEDURE — 3008F BODY MASS INDEX DOCD: CPT | Mod: CPTII,S$GLB,, | Performed by: PHYSICIAN ASSISTANT

## 2024-04-30 PROCEDURE — 83880 ASSAY OF NATRIURETIC PEPTIDE: CPT | Performed by: INTERNAL MEDICINE

## 2024-04-30 PROCEDURE — 1101F PT FALLS ASSESS-DOCD LE1/YR: CPT | Mod: CPTII,S$GLB,, | Performed by: PHYSICIAN ASSISTANT

## 2024-04-30 PROCEDURE — 3044F HG A1C LEVEL LT 7.0%: CPT | Mod: CPTII,S$GLB,, | Performed by: PHYSICIAN ASSISTANT

## 2024-04-30 PROCEDURE — 99999 PR PBB SHADOW E&M-EST. PATIENT-LVL V: CPT | Mod: PBBFAC,,, | Performed by: PHYSICIAN ASSISTANT

## 2024-04-30 PROCEDURE — 3288F FALL RISK ASSESSMENT DOCD: CPT | Mod: CPTII,S$GLB,, | Performed by: PHYSICIAN ASSISTANT

## 2024-04-30 PROCEDURE — 3078F DIAST BP <80 MM HG: CPT | Mod: CPTII,S$GLB,, | Performed by: PHYSICIAN ASSISTANT

## 2024-04-30 PROCEDURE — 1159F MED LIST DOCD IN RCRD: CPT | Mod: CPTII,S$GLB,, | Performed by: PHYSICIAN ASSISTANT

## 2024-04-30 PROCEDURE — 80048 BASIC METABOLIC PNL TOTAL CA: CPT | Performed by: INTERNAL MEDICINE

## 2024-04-30 PROCEDURE — 1125F AMNT PAIN NOTED PAIN PRSNT: CPT | Mod: CPTII,S$GLB,, | Performed by: PHYSICIAN ASSISTANT

## 2024-04-30 PROCEDURE — 36415 COLL VENOUS BLD VENIPUNCTURE: CPT | Performed by: INTERNAL MEDICINE

## 2024-04-30 RX ORDER — AMITRIPTYLINE HYDROCHLORIDE 25 MG/1
25 TABLET, FILM COATED ORAL NIGHTLY
Qty: 90 TABLET | Refills: 3 | Status: SHIPPED | OUTPATIENT
Start: 2024-04-30 | End: 2025-04-30

## 2024-04-30 RX ORDER — PANTOPRAZOLE SODIUM 40 MG/1
40 TABLET, DELAYED RELEASE ORAL DAILY
Qty: 90 TABLET | Refills: 1 | Status: SHIPPED | OUTPATIENT
Start: 2024-04-30

## 2024-04-30 RX ORDER — SERTRALINE HYDROCHLORIDE 100 MG/1
100 TABLET, FILM COATED ORAL NIGHTLY
Qty: 90 TABLET | Refills: 4 | Status: SHIPPED | OUTPATIENT
Start: 2024-04-30

## 2024-04-30 NOTE — TELEPHONE ENCOUNTER
Left message for patient he has 2 more times to take Zolair at the clinic before using at home.      ----- Message from Mary Cardoza MA sent at 4/29/2024  9:50 AM CDT -----  Contact: [phone]  Will pt receive injection at clinic or home?  ----- Message -----  From: April Sol MA  Sent: 4/29/2024   9:47 AM CDT  To: Mary Cardoza MA    Looks like he got a Xolair injection in clinic on 4/19. Pt shouldn't  medicine from anywhere. I am not sure if it is delivered to clinic or pt. This would be a question for Divina.  ----- Message -----  From: Mary Cardoza MA  Sent: 4/29/2024   9:30 AM CDT  To: April Sol MA    Do you know where pt picks up omalizumab injection or is it delivered?  Thank you  ----- Message -----  From: Jovanny Kwok MA  Sent: 4/29/2024   9:10 AM CDT  To: David Galindo Staff    Pt called                Pt is requesting a call back to speak with staff or provider about a new medication that was prescribed. Pt wasn't sure of name to enclosed in message.

## 2024-04-30 NOTE — PROGRESS NOTES
Subjective:      Patient ID: Crow Green is a 68 y.o. male.    Chief Complaint: Annual Exam (Back pain)    HPI  Here today for a routine 6 month follow up for his medical problems.   Up to date with cardiologist, DR. Zhao.   DM controlled. Seeing diabetes team. See's podiatry as well. Up to date.     Still chronic daily headaches. No improvement with tylenol. Scheduled to see neuro in oct for headaches.   Will try to get him a sooner appointment with neuro.   Temporal biopsy done a few years ago was negative.     Lab Results   Component Value Date    SEDRATE 92 (H) 01/29/2024       Also seeing allergist and pulm. Up to date. Seen this year.   SANDRITA: on supplemental oxygen and cpap. Stable.     Needs home health to come back to help him organize his medications. Headaches causing confusion and visual changes making it hard for him to take his medications.     Trouble swallowing thick foods like meat and breads. Chokes him and takes a long time to swallow.     Patient Active Problem List   Diagnosis    ED (erectile dysfunction)    Non-proliferative diabetic retinopathy, mild, both eyes    Essential hypertension    Diabetic polyneuropathy    Nonobstructive coronary artery disease of native artery of native heart    Chronic combined systolic and diastolic heart failure    Type 2 diabetes mellitus with stage 3a chronic kidney disease, with long-term current use of insulin    SANDRITA (obstructive sleep apnea)    Allergic asthma    Psychophysiological insomnia    Nightmares    Sleep related gastroesophageal reflux disease    Inadequate sleep hygiene    Chest pain    PAF (paroxysmal atrial fibrillation)    At risk for medication noncompliance    Obesity (BMI 30.0-34.9)    Indeterminate stage chronic open angle glaucoma    Right hip pain    Gait instability    Chronic right shoulder pain    Arthritis    Left sided sciatica    Osteoarthritis of spine with radiculopathy, lumbar region    HNP (herniated nucleus pulposus),  lumbar    Gastroesophageal reflux disease without esophagitis    Hypogonadism in male    Disorder of parathyroid gland    Hyperlipidemia associated with type 2 diabetes mellitus    Traumatic tear of left rotator cuff    Other chronic pain    Left shoulder pain    Complete tear of left rotator cuff    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 associated with type 2 diabetes mellitus    Lumbar radiculopathy    Diabetic ulcer of toe of left foot associated with type 2 diabetes mellitus, with necrosis of muscle    Ulcer of left foot    Elevated troponin    Hypotension due to medication    Dermatomyositis    Postprocedural hypotension    Advanced directives, counseling/discussion    Schizoaffective disorder, depressive type    Chronic obstructive pulmonary disease    MDD (major depressive disorder), recurrent episode, moderate    DOMINIK (generalized anxiety disorder)    Bilateral carpal tunnel syndrome    Rotator cuff tear arthropathy of left shoulder    Sacroiliac dysfunction    Atherosclerosis of native coronary artery of native heart with unstable angina pectoris    Coronary vasospasm    Cocaine abuse    Osteopenia    Atherosclerosis of aorta    Cardiac asthma    Chronic gout of multiple sites    Acquired absence of left great toe    Hypoxia    Cardiomyopathy    Bilateral carotid artery stenosis    Substance abuse(UDS + for cocaine )    Immunocompromised patient    Other reduced mobility    Abnormality of gait and mobility    Dependence on supplemental oxygen    Positive depression screening    Pulmonary hypertension- echo 7/2022    NICM (nonischemic cardiomyopathy)    Elevated IgE level    Moderate persistent asthma without complication    Elevated coronary artery calcium score    Other chest pain         Current Outpatient Medications:     albuterol (PROVENTIL) 2.5 mg /3 mL (0.083 %) nebulizer solution, INHALE THE CONTENTS OF 1 VIAL VIA NEBULIZER EVERY 4 TO 6 HOURS AS NEEDED FOR  WHEEZING OR FOR SHORTNESS OF BREATH, Disp: 1080 mL, Rfl: 3    allopurinoL (ZYLOPRIM) 100 MG tablet, Take 1 tablet (100 mg total) by mouth once daily., Disp: 90 tablet, Rfl: 3    amLODIPine (NORVASC) 2.5 MG tablet, Take 1 tablet (2.5 mg total) by mouth once daily., Disp: 90 tablet, Rfl: 3    atorvastatin (LIPITOR) 40 MG tablet, Take 1 tablet (40 mg total) by mouth every evening., Disp: 90 tablet, Rfl: 3    blood sugar diagnostic (TRUE METRIX GLUCOSE TEST STRIP) Strp, Inject 1 each into the skin 4 (four) times daily., Disp: 150 strip, Rfl: 1    blood-glucose meter Misc, Use as instructed to test blood glucose meter daily., Disp: 1 each, Rfl: 0    brinzolamide-brimonidine (SIMBRINZA) 1-0.2 % DrpS, Place 1 drop into both eyes 3 (three) times daily., Disp: 8 mL, Rfl: 6    cyanocobalamin (VITAMIN B-12) 1000 MCG tablet, Take 1 tablet by mouth once daily., Disp: , Rfl:     DEXCOM G7 SENSOR Shefali, Change sensor every 10 days, Disp: 3 each, Rfl: 11    ELIQUIS 5 mg Tab, TAKE 1 TABLET TWICE DAILY, Disp: 180 tablet, Rfl: 3    empagliflozin (JARDIANCE) 25 mg tablet, Take 1 tablet (25 mg total) by mouth once daily., Disp: 90 tablet, Rfl: 3    fluticasone-umeclidin-vilanter (TRELEGY ELLIPTA) 200-62.5-25 mcg inhaler, Inhale 1 puff into the lungs once daily., Disp: 360 each, Rfl: 5    furosemide (LASIX) 40 MG tablet, Take 2.5 tablets (100 mg total) by mouth 2 (two) times a day. (Patient taking differently: Take 120 mg by mouth 2 (two) times a day.), Disp: 150 tablet, Rfl: 10    gabapentin (NEURONTIN) 800 MG tablet, Take 1 tablet (800 mg total) by mouth 3 (three) times daily., Disp: 270 tablet, Rfl: 4    insulin aspart U-100 (NOVOLOG FLEXPEN U-100 INSULIN) 100 unit/mL (3 mL) InPn pen, INJECT 10 UNITS UNDER THE SKIN THREE TIMES DAILY WITH MEALS, Disp: 15 mL, Rfl: 10    INV allopurinol tablet, Take 1 tablet by mouth once daily., Disp: , Rfl:     lancets (TRUEPLUS LANCETS) 33 gauge Misc, Use to check blood sugar twice a day, Disp: 100  "each, Rfl: 6    lancets (TRUEPLUS LANCETS) 33 gauge Misc, USE AS DIRECTED TO TEST BLOOD SUGAR FOUR TIMES DAILY, Disp: 200 each, Rfl: 5    latanoprost 0.005 % ophthalmic solution, INSTILL 1 DROP INTO BOTH EYES ONE TIME DAILY BOTTLE EXPIRES 42 DAYS AFTER OPENING..., Disp: 7.5 mL, Rfl: 3    lisinopriL (PRINIVIL,ZESTRIL) 30 MG tablet, TAKE 1 TABLET EVERY DAY., Disp: 90 tablet, Rfl: 3    multivitamin-iron-folic acid (SENTRY) Tab, Take 1 tablet by mouth once daily., Disp: , Rfl:     omalizumab (XOLAIR) 150 mg/mL injection, Inject 2 mLs (300 mg total) into the skin every 14 (fourteen) days., Disp: 4 each, Rfl: 11    omalizumab (XOLAIR) 75 mg/0.5 mL injection, Inject 0.5 mLs (75 mg total) into the skin every 14 (fourteen) days., Disp: 2 each, Rfl: 11    papaverine-phentolamin-alprost 150 mg-5 mg- 50 mcg SolR, 0.5 mLs by Intracavernosal route as needed (ED)., Disp: 10 each, Rfl: ml    pen needle, diabetic (BD ULTRA-FINE SHORT PEN NEEDLE) 31 gauge x 5/16" Ndle, 1 each by Misc.(Non-Drug; Combo Route) route 3 (three) times daily., Disp: 300 each, Rfl: 3    pep injection, Inject 0.15 ml as directed   For compounding pharmacy use:  Add PAPAVERINE 30 mg Add PHENTOLAMINE 1 mg Add ALPROSTADIL 20 mcg, Disp: 1 vial, Rfl: 5    predniSONE (DELTASONE) 20 MG tablet, Take 1 tablet (20 mg total) by mouth 2 (two) times daily., Disp: 10 tablet, Rfl: 0    predniSONE (DELTASONE) 50 MG Tab, Take 50 mg by mouth., Disp: , Rfl:     ranolazine (RANEXA) 1,000 mg Tb12, TAKE 1 TABLET TWICE DAILY, Disp: 180 tablet, Rfl: 2    semaglutide (OZEMPIC) 2 mg/dose (8 mg/3 mL) PnIj, Inject 2 mg into the skin every 7 days., Disp: 9 mL, Rfl: 3    traZODone (DESYREL) 100 MG tablet, TAKE 2 TABLETS EVERY EVENING, Disp: 180 tablet, Rfl: 3    VIOS AEROSOL DELIVERY SYSTEM Shefali, USE AS DIRESTED, Disp: , Rfl: 0    amitriptyline (ELAVIL) 25 MG tablet, Take 1 tablet (25 mg total) by mouth every evening., Disp: 90 tablet, Rfl: 3    EPINEPHrine (EPIPEN) 0.3 mg/0.3 mL AtIn, " Inject 0.3 mLs (0.3 mg total) into the muscle once. for 1 dose, Disp: 2 each, Rfl: 3    pantoprazole (PROTONIX) 40 MG tablet, Take 1 tablet (40 mg total) by mouth once daily., Disp: 90 tablet, Rfl: 1    sertraline (ZOLOFT) 100 MG tablet, Take 1 tablet (100 mg total) by mouth every evening., Disp: 90 tablet, Rfl: 4    Current Facility-Administered Medications:     omalizumab injection 300 mg, 300 mg, Subcutaneous, Q28 Days, , 300 mg at 04/19/24 1045    omalizumab injection 75 mg, 75 mg, Subcutaneous, Q28 Days, , 75 mg at 04/19/24 1049    Review of Systems   Constitutional:  Negative for activity change, appetite change, chills, diaphoresis, fatigue, fever and unexpected weight change.   HENT:  Positive for trouble swallowing. Negative for congestion, hearing loss, postnasal drip, rhinorrhea, sore throat and voice change.    Eyes: Negative.  Negative for visual disturbance.   Respiratory:  Positive for shortness of breath. Negative for cough, choking and chest tightness.    Cardiovascular:  Negative for chest pain, palpitations and leg swelling.   Gastrointestinal:  Negative for abdominal distention, abdominal pain, blood in stool, constipation, diarrhea, nausea and vomiting.   Endocrine: Negative for cold intolerance, heat intolerance, polydipsia and polyuria.   Genitourinary: Negative.  Negative for difficulty urinating and frequency.   Musculoskeletal:  Positive for arthralgias, back pain, gait problem and myalgias. Negative for joint swelling.   Skin:  Negative for color change, pallor, rash and wound.   Neurological:  Positive for headaches. Negative for dizziness, tremors, weakness, light-headedness and numbness.   Hematological:  Negative for adenopathy.   Psychiatric/Behavioral:  Negative for behavioral problems, confusion, self-injury, sleep disturbance and suicidal ideas. The patient is not nervous/anxious.      Objective:   /74 (BP Location: Left arm, Patient Position: Sitting, BP Method: Large  "(Manual))   Pulse 83   Temp 97.8 °F (36.6 °C) (Tympanic)   Ht 5' 5" (1.651 m)   Wt 85.8 kg (189 lb 2.5 oz)   SpO2 99%   BMI 31.48 kg/m²     Physical Exam  Vitals and nursing note reviewed.   Constitutional:       General: He is not in acute distress.     Appearance: Normal appearance. He is well-developed. He is not ill-appearing, toxic-appearing or diaphoretic.   HENT:      Head: Normocephalic and atraumatic.   Cardiovascular:      Rate and Rhythm: Normal rate and regular rhythm.      Heart sounds: Normal heart sounds. No murmur heard.     No friction rub. No gallop.   Pulmonary:      Effort: Pulmonary effort is normal. No respiratory distress.      Breath sounds: Normal breath sounds. No wheezing or rales.   Skin:     General: Skin is warm.      Findings: No rash.   Neurological:      Mental Status: He is alert and oriented to person, place, and time.   Psychiatric:         Mood and Affect: Mood normal.         Behavior: Behavior normal.         Thought Content: Thought content normal.         Judgment: Judgment normal.       Lab Results   Component Value Date    HGBA1C 5.7 (H) 04/10/2024     CMP  Sodium   Date Value Ref Range Status   10/30/2023 143 136 - 145 mmol/L Final   10/30/2023 143 136 - 145 mmol/L Final     Potassium   Date Value Ref Range Status   10/30/2023 3.9 3.5 - 5.1 mmol/L Final   10/30/2023 3.9 3.5 - 5.1 mmol/L Final     Chloride   Date Value Ref Range Status   10/30/2023 104 95 - 110 mmol/L Final   10/30/2023 104 95 - 110 mmol/L Final     CO2   Date Value Ref Range Status   10/30/2023 28 23 - 29 mmol/L Final   10/30/2023 28 23 - 29 mmol/L Final     Glucose   Date Value Ref Range Status   10/30/2023 157 (H) 70 - 110 mg/dL Final   10/30/2023 157 (H) 70 - 110 mg/dL Final     BUN   Date Value Ref Range Status   10/30/2023 16 8 - 23 mg/dL Final   10/30/2023 16 8 - 23 mg/dL Final     Creatinine   Date Value Ref Range Status   04/19/2024 1.1 0.5 - 1.4 mg/dL Final     Calcium   Date Value Ref Range " Status   10/30/2023 9.5 8.7 - 10.5 mg/dL Final   10/30/2023 9.5 8.7 - 10.5 mg/dL Final     Total Protein   Date Value Ref Range Status   10/30/2023 7.8 6.0 - 8.4 g/dL Final     Albumin   Date Value Ref Range Status   10/30/2023 3.1 (L) 3.5 - 5.2 g/dL Final   12/04/2014 4.0 3.6 - 5.1 g/dL Final     Comment:     @ Test Performed By:  [facility]  Gio De León M.D., FCAP.,   [address] [place] [id number]  [facility] #[id number]       Total Bilirubin   Date Value Ref Range Status   10/30/2023 0.2 0.1 - 1.0 mg/dL Final     Comment:     For infants and newborns, interpretation of results should be based  on gestational age, weight and in agreement with clinical  observations.    Premature Infant recommended reference ranges:  Up to 24 hours.............<8.0 mg/dL  Up to 48 hours............<12.0 mg/dL  3-5 days..................<15.0 mg/dL  6-29 days.................<15.0 mg/dL       Alkaline Phosphatase   Date Value Ref Range Status   10/30/2023 79 55 - 135 U/L Final     AST   Date Value Ref Range Status   10/30/2023 21 10 - 40 U/L Final     ALT   Date Value Ref Range Status   10/30/2023 15 10 - 44 U/L Final     Anion Gap   Date Value Ref Range Status   10/30/2023 11 8 - 16 mmol/L Final   10/30/2023 11 8 - 16 mmol/L Final     eGFR   Date Value Ref Range Status   04/19/2024 >60.0 >60 mL/min/1.73 m^2 Final     Lab Results   Component Value Date    WBC 8.00 03/31/2023    HGB 11.2 (L) 03/31/2023    HCT 35.0 (L) 03/31/2023    MCV 80 (L) 03/31/2023     03/31/2023       Lab Results   Component Value Date    TSH 0.881 05/23/2023     Lab Results   Component Value Date    CREATININE 1.1 04/19/2024    BUN 16 10/30/2023    BUN 16 10/30/2023     10/30/2023     10/30/2023    K 3.9 10/30/2023    K 3.9 10/30/2023     10/30/2023     10/30/2023    CO2 28 10/30/2023    CO2 28 10/30/2023     Lab Results   Component Value Date    PTH 49.0 02/27/2018     CALCIUM 9.5 10/30/2023    CALCIUM 9.5 10/30/2023    CAION 1.18 02/27/2018    PHOS 2.6 (L) 03/04/2022       Assessment:     1. Chronic daily headache    2. Type 2 diabetes mellitus with mild nonproliferative retinopathy and macular edema, with long-term current use of insulin, unspecified laterality    3. 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 associated with type 2 diabetes mellitus    4. Diabetic polyneuropathy associated with type 2 diabetes mellitus    5. Screening for prostate cancer    6. Hyperlipidemia associated with type 2 diabetes mellitus    7. Atherosclerosis of aorta    8. Bilateral carotid artery stenosis    9. PAF (paroxysmal atrial fibrillation)    10. Nonobstructive coronary artery disease of native artery of native heart    11. Chronic combined systolic and diastolic heart failure    12. SANDRITA (obstructive sleep apnea)    13. Elevated coronary artery calcium score    14. Dependence on supplemental oxygen    15. Abnormality of gait and mobility    16. Dermatomyositis    17. Immunocompromised patient    18. Acquired absence of left great toe    19. Osteopenia of multiple sites    20. Obesity (BMI 30.0-34.9)    21. Schizoaffective disorder, depressive type    22. Chronic gout of multiple sites, unspecified cause    23. Body mass index (BMI) 31.0-31.9, adult    24. Medication noncompliance due to cognitive impairment    25. Dysphagia, unspecified type    26. MDD (major depressive disorder), recurrent episode, moderate    27. DOMINIK (generalized anxiety disorder)    28. Gastroesophageal reflux disease without esophagitis      Plan:   Chronic daily headache  -     Ambulatory referral/consult to Neurology; Future; Expected date: 05/07/2024  -     amitriptyline (ELAVIL) 25 MG tablet; Take 1 tablet (25 mg total) by mouth every evening.  Dispense: 90 tablet; Refill: 3    Type 2 diabetes mellitus with mild nonproliferative retinopathy and macular edema, with long-term current  use of insulin, unspecified laterality  -     Hemoglobin A1C; Future; Expected date: 10/30/2024  -     CBC Auto Differential; Future; Expected date: 10/30/2024  -     Comprehensive Metabolic Panel; Future; Expected date: 10/30/2024  -     Microalbumin/Creatinine Ratio, Urine; Future; Expected date: 10/30/2024  -labs in 6 months    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 associated with type 2 diabetes mellitus  -up to date with ophthalmology    Diabetic polyneuropathy associated with type 2 diabetes mellitus  -stable. Seeing podiatry    Screening for prostate cancer  -     PSA, Screening; Future; Expected date: 10/30/2024    Hyperlipidemia associated with type 2 diabetes mellitus  -     Lipid Panel; Future; Expected date: 10/30/2024    Atherosclerosis of aorta  -stable on current meds    Bilateral carotid artery stenosis  -up to date with cardiovascular specialist    PAF (paroxysmal atrial fibrillation)  -     TSH; Future; Expected date: 10/30/2024    Nonobstructive coronary artery disease of native artery of native heart  -up to date with cardiology team    Chronic combined systolic and diastolic heart failure  -     Ambulatory referral/consult to Home Health; Future; Expected date: 05/01/2024    SANDRITA (obstructive sleep apnea)  -stable on oxygen/cpap    Elevated coronary artery calcium score    Dependence on supplemental oxygen    Abnormality of gait and mobility    Dermatomyositis  -stable. Seeing allergy currently    Immunocompromised patient    Acquired absence of left great toe  -stable. See's podiatry    Osteopenia of multiple sites  -     Vitamin D; Future; Expected date: 10/30/2024    Obesity (BMI 30.0-34.9)    Schizoaffective disorder, depressive type  -mood stable    Chronic gout of multiple sites, unspecified cause  -     Uric Acid; Future; Expected date: 10/30/2024    Body mass index (BMI) 31.0-31.9, adult  -     Vitamin D; Future; Expected date:  10/30/2024    Medication noncompliance due to cognitive impairment  -     Ambulatory referral/consult to Home Health; Future; Expected date: 05/01/2024    Dysphagia, unspecified type  -     Ambulatory referral/consult to Speech Therapy; Future; Expected date: 05/07/2024    MDD (major depressive disorder), recurrent episode, moderate  -     sertraline (ZOLOFT) 100 MG tablet; Take 1 tablet (100 mg total) by mouth every evening.  Dispense: 90 tablet; Refill: 4    DOMINIK (generalized anxiety disorder)  -     sertraline (ZOLOFT) 100 MG tablet; Take 1 tablet (100 mg total) by mouth every evening.  Dispense: 90 tablet; Refill: 4    Gastroesophageal reflux disease without esophagitis  -     pantoprazole (PROTONIX) 40 MG tablet; Take 1 tablet (40 mg total) by mouth once daily.  Dispense: 90 tablet; Refill: 1      -add elavil at night for headaches. If no improvement in 4 weeks discontinue elavil. Will see if he can get in sooner to see neurologist.   -home health to help him with medications  -up to date with specialist.     Follow up in about 4 weeks (around 5/28/2024), or if symptoms worsen or fail to improve.

## 2024-05-01 ENCOUNTER — PATIENT MESSAGE (OUTPATIENT)
Dept: CARDIOLOGY | Facility: CLINIC | Age: 69
End: 2024-05-01
Payer: MEDICARE

## 2024-05-03 PROCEDURE — G0180 MD CERTIFICATION HHA PATIENT: HCPCS | Mod: ,,, | Performed by: NURSE PRACTITIONER

## 2024-05-06 DIAGNOSIS — I65.23 BILATERAL CAROTID ARTERY STENOSIS: Primary | ICD-10-CM

## 2024-05-07 ENCOUNTER — TELEPHONE (OUTPATIENT)
Dept: VASCULAR SURGERY | Facility: CLINIC | Age: 69
End: 2024-05-07
Payer: MEDICARE

## 2024-05-08 ENCOUNTER — CLINICAL SUPPORT (OUTPATIENT)
Dept: DIABETES | Facility: CLINIC | Age: 69
End: 2024-05-08
Payer: MEDICARE

## 2024-05-08 ENCOUNTER — TELEPHONE (OUTPATIENT)
Dept: DIABETES | Facility: CLINIC | Age: 69
End: 2024-05-08
Payer: MEDICARE

## 2024-05-08 ENCOUNTER — CLINICAL SUPPORT (OUTPATIENT)
Dept: REHABILITATION | Facility: HOSPITAL | Age: 69
End: 2024-05-08
Payer: MEDICARE

## 2024-05-08 DIAGNOSIS — Z79.4 TYPE 2 DIABETES MELLITUS WITH STAGE 3A CHRONIC KIDNEY DISEASE, WITH LONG-TERM CURRENT USE OF INSULIN: Primary | ICD-10-CM

## 2024-05-08 DIAGNOSIS — N18.31 TYPE 2 DIABETES MELLITUS WITH STAGE 3A CHRONIC KIDNEY DISEASE, WITH LONG-TERM CURRENT USE OF INSULIN: Primary | ICD-10-CM

## 2024-05-08 DIAGNOSIS — R13.10 DYSPHAGIA, UNSPECIFIED TYPE: ICD-10-CM

## 2024-05-08 DIAGNOSIS — E11.22 TYPE 2 DIABETES MELLITUS WITH STAGE 3A CHRONIC KIDNEY DISEASE, WITH LONG-TERM CURRENT USE OF INSULIN: Primary | ICD-10-CM

## 2024-05-08 PROCEDURE — 92610 EVALUATE SWALLOWING FUNCTION: CPT | Performed by: SPEECH-LANGUAGE PATHOLOGIST

## 2024-05-08 NOTE — TELEPHONE ENCOUNTER
Called Mr. Green who states he's having trouble putting his sensor code in. He is currently at the [facility] and has asked to be seen today. Scheduled him for a NV at 10 am for Dexcom assistance. Successful call.     ----- Message from Manuel Hale sent at 5/8/2024  8:33 AM CDT -----  Contact: Crow Maria has a coming in today his dexcon 7 forgot use it. He states he can't key in the numbers. Please call at [phone].    Thank you manuel

## 2024-05-08 NOTE — PROGRESS NOTES
Pt in lobby having issues with his G7 sensor not connecting to his . After accessing issue pt didn't not end previous sensor and enter new sensor numbers to start new sensor. I ended old sensor and started new sensor that he had already place don his arm. Sensor warm up completed and sensor functioning properly.

## 2024-05-08 NOTE — PLAN OF CARE
"[facility]  Speech Therapy Evaluation -Dysphagia    Date: 5/8/2024     Name: Crow Green   MRN: [id number]    Therapy Diagnosis:   Encounter Diagnosis   Name Primary?    Dysphagia, unspecified type     Physician: Lakeisha Pastor  Physician Orders: Ambulatory Referral to Speech Therapy   Medical Diagnosis: Dysphagia, unspecified type [R13.10]     Visit # / Visits Authorized:  1 / 1   Date of Evaluation:  5/8/2024   Insurance Authorization Period: 4/30/2024 - 4/30/2025   Plan of Care Certification:    5/8/2024 to 7/17/24      Time In: 9:05 AM   Time Out: 9:40 AM   Total time: 35 mins     Procedure   Swallow and Oral Function Evaluation      Precautions: Standard and Fall  Subjective     History of Current Condition:  Crow Green is a 68 y.o. male who presents to [facility] Outpatient Speech Therapy for evaluation secondary to Dysphagia, unspecified type [R13.10]. Patient was referred to therapy by Lakeisha Pastor, which is the patient's primary care provider. Patient reports onset of dysphagia symptoms several months ago, which he describes as difficulty swallowing solid consistencies, specifically meats and breads that get "stuck" in his throat and require effort to clear. He has to use liquid wash to facilitate clearance. He denies difficulty swallowing liquids. He reports infrequent overt signs of aspiration. He has lost about 10-15 lbs in the past month or so, but denies recent pneumonia. He has a long history of GERD for which he takes Protonix "most days"; he has globus sensation, heartburn/indigestion, and frequently wakes up coughing during the night.      Past Medical History: Crow Green  has a past medical history of Arthritis, Asthma, Atrial fibrillation, Atrial fibrillation with RVR (08/07/2019), Carotid artery stenosis, CHF (congestive heart failure), Chronic obstructive pulmonary disease (08/05/2020), Depression, " Dermatomyositis, Diabetes mellitus, type 2 (Diagnosed in 2000), Elevated coronary artery calcium score (02/14/2024), Elevated PSA, Follow up (07/30/2020), Hypertension, Myocardial infarction, and Sleep apnea.  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  Medical Hx and Allergies: Crow has a current medication list which includes the following prescription(s): albuterol, allopurinol, amitriptyline, amlodipine, atorvastatin, blood sugar diagnostic, blood-glucose meter, simbrinza, cyanocobalamin, dexcom g7 sensor, eliquis, empagliflozin, epinephrine, trelegy ellipta, furosemide, gabapentin, insulin aspart u-100, inv allopurinol, lancets, trueplus lancets, latanoprost, lisinopril, sentry, xolair, omalizumab, pantoprazole, papaverine-phentolamin-alprost, pen needle, diabetic, alprostadil, prednisone, prednisone, ranolazine, ozempic, sertraline, trazodone, vios aerosol delivery system, and [DISCONTINUED] diclofenac sodium, and the following Facility-Administered Medications: omalizumab and omalizumab.   Review of patient's allergies indicates:   Allergen Reactions    Protamine Hives     Urticaria, possible upper airway swelling     Prior Therapy:  NA  Social History:  Patient lives in [place], Patient is currently driving  Occupation:  disability   Prior Level of Function: WFL   Current Level of Function: dysphagia to solids   Pain Scale: 8/10 on a  Visual Analog Scale currently.  Pain Location: shoulders and back  Patient's Therapy Goals:  evaluate swallow functioning   Objective   Formal Assessment:    Cranial Nerve Examination  Cranial Nerve 5: Trigeminal Nerve  Motor Jaw Posture at rest: Closed  Mandible Elevation/Depression: WFL  Mandible lateralization: WFL  Abnormal movement: absent Interpretation: Intact bilaterally    Sensory Forehead: WFL  Cheek: WFL  Jaw: WFL  Facial Pain: None noted Interpretation: Intact bilaterally      Cranial Nerve 7: Facial Nerve  Motor Facial Symmetry: WNL  Wrinkle Forehead: WFL  Close eyes tightly: WFL  Labial Protrusion: WFL  Labial Retraction: WFL  Buccal Strength with Labial Seal: WFL  Abnormal movement: absent Interpretation: Intact bilaterally    Sensory Formal testing not completed.       Cranial Nerves IX and X: Glossopharyngeal and Vagus Nerves  Motor Palatal Symmetry (Rest): WNL  Palatal Symmetry (Movement): WNL  Cough: Perceptually strong  Voice Prior to PO intake: Clear  Resonance: Normal  Abnormal movement: absent Interpretation: Intact bilaterally      Cranial Nerve XII: Hypoglossal Nerve  Motor Tongue at rest: WNL  Lingual Protrusion: WNL  Lingual Protrusion against Resistance: WNL  Lingual Lateralization: WNL  Abnormal movement: absent Interpretation: Intact bilaterally      Other information:  Volitional Swallow: Able to palpate laryngeal rise  Mucosal Quality: No abnormal findings  Secretion Management: no overt deficits noted/observed  Dentition: Good condition for speech and mastication      Clinical Swallow Evaluation:   Predisposing dysphagia risk factors: COPD, asthma, diabetes, afib, hypertension, hyperlipidemia, heart failure, cardiomyopathy, pulmonary hypertension, CKD, GERD, SANDRITA  Clinical signs of possible chronic dysphagia: reported unintentional weight loss and globus sensation with solid consistencies   Precipitating dysphagia risk factors: NA    EAT-10 Score:    Eating Assessment Tool (EAT-10) is  a questionnaire given to the patient to  his swallowing function and quality of life as it relates to patient's perceived swallowing deficits. A score that is greater than 3 may be indicative of swallowing problems. (Brendan et al., 2008); higher scores correlate with increased penetration-aspiration  scale scores, and scores greater than 15 results in the patient being 2.2 times more likely to aspirate (Santiago et al., 2015)    My swallowing problem has caused me to lose weight.  4   2.   My swallowing problem interferes with my ability to go out for meals.  4   3.   Swallowing liquids takes extra effort. 0   4.   Swallowing solids takes extra effort.  4   5.   Swallowing pills takes extra effort.  4   6.   Swallowing is painful.  4   7.   The pleasure of eating is affected by my swallowing.  4   8.   When I swallow, food sticks in my throat.  1   9.   I cough when I eat.  1   10. Swallowing is stressful. 4   TOTAL 30/40     References:   TOREI Cardona., RENETTA Grey., CLEOPATRA Christopher., CHRIS Alanis., Yoav, CONNER VAZQUEZ., CHRIS Martinez, & DARELL Tiwari (2008). Validity and reliability of the Eating Assessment Tool (EAT-10). Annals of Otology, Rhinology & Laryngology, 117(12), 919-924. https://doi.org/10.1177/642475980893650052  RENETTA Henderson., SOILA Teague., CHRIS Rubio., Mustapha, M. A., & TORIE Cardona. (2015). The ability of the 10-item eating assessment tool (EAT-10) to predict aspiration risk in persons with dysphagia. Annals of Otology, Rhinology & Laryngology, 124(5), 351-354. https://doi.org/10.1177/8442048928011877    Reflux Symptom Index (RSI) Score:   Reflux Symptom Index (RSI) is a questionnaire given to the patient to  the possible presence and/or severity of LPR symptoms within the last month and any relationship between this condition and the patient's dysphagia. A score that is greater than 15 may be indicative of LPR.    Hoarseness or problem with your voice  0   2.   Clearing your throat  4   3.    Excess throat mucus or post-nasal drip 3   4.   Difficulty swallowing food, liquid, or pills 5   5.   Coughing after you ate or after lying down  4   6.   Breathing difficulties or choking episodes 1   7.   Troublesome or annoying cough 5   8.   Sensations of something sticking in your throat or a lump in your throat 1   9.   Heartburn, chest pain, indigestion, or stomach acid coming up 5   TOTAL 28/45       Spiro Swallow Protocol:  Spiro Swallow Protocol dictates patient remain NPO if fail screener; (Shellier et al. 2014) however, as objective swallow assessment is not available for greater than a week, patient will remain on current diet until objective assessment is completed unless otherwise indicated.     The Spiro Swallow Protocol was administered. The patient was alert and provided the instructions prior to the beginning of the protocol. The patient consumed 3 oz before putting the cup down. Patient drank with consecutive swallows. Patient without overt signs or symptoms of penetration/aspiration.       Clinical Swallow Examination:   Of note, patient self-fed throughout evaluation. Patient presented with:     CONSISTENCY  NOTES   THIN (IDDSI 0) 3 oz water challenge    No overt signs of aspiration or pharyngeal dysphagia.    SOLID (IDDSI 7/Regular) Bite of tom cracker x 2   Adequate mastication time. No anterior loss or oral residue. Endorses globus sensation with swallow. 1-2 swallows required per bolus. No overt sings of aspiration or change in vocal quality.      MIXED CONSISTENCY (IDDSI 6/0 Soft and Bite Sized/Thin Liquid) Bite of fruit cocktail x 2 Immediate coughing after the swallow and delayed throat clear. Adequate mastication time. No anterior loss or oral residue. Endorses globus sensation with swallow. 1-2 swallows required per bolus.      *Thickened liquids were not used in this assessment. Sole (2018) reported that thickened liquids have no sound evidence as reducing risk of pneumonia in  patients with dysphagia and can cause harm by increasing risk of dehydration. It also presents an increased risk of UTI, electrolyte imbalance, constipation, fecal impaction, cognitive impairment, functional decline, and even death (Marianela, 2002; Awilda, 2016).  This supports the assertion that we should confirm a patient requires thickened liquids with an instrumental swallow study prior to recommending them.    INTERPRETATION:  Clinical swallow evaluation (CSE) revealed oral phase characterized by lingual, labial, and buccal strength and range of motion within functional limits for lip closure, bolus preparation and propulsion. The patient had no anterior loss of the bolus with complete closure of the lips around the cup rim/spoon. No residue remained in the oral cavity following the swallow. Patient with overt clinical sign/symptoms of aspiration on mixed consistencies only. No signs of pharyngeal inefficiency; however, patient reported globus sensation on trials of solid consistency. Contributing risk factors for dysphagia include deficits in respiratory-swallow coordination.       Treatment   Total Treatment Time Separate from Evaluation: not applicable   No treatment performed secondary to time to complete evaluation.     Education provided:   -role of Speech Therapy, goals/plan of care, scheduling/cancellations, insurance limitations with patient    Patient  expressed understanding.     Home Program: to be established as needed pending MBSS findings and recommendations   Assessment     Crow presents to [facility] status post medical diagnosis of Dysphagia, unspecified type [R13.10].     Interpretation of objective assessment:   He presents with clinical signs of pharyngeal-esophageal dysphagia, likely chronic and anticipated to be related to multiple medical comorbidities, specifically symptomatic GERD. Swallowing prognosis is Good secondary to patient motivation to improve.  Instrumental swallow study is indicated to assess the swallowing physiology and rule out aspiration of various consistencies. Given prolonged wait time for outpatient instrumental studies, patient should continue current diet prior to instrumental study. Will reach out to referring provider to request MBSS order.     Demonstrates impairments including limitations as described in the problem list.     Positive prognostic factors: patient motivation to improve  Negative prognostic factors: multiple medical comorbidities  Barriers to therapy: No barriers to therapy identified.     Patient's spiritual, cultural, and educational needs considered and patient agreeable to plan of care and goals.    Will determine prognosis and need for therapy pending MBSS findings and recommendations.     Short Term Goals: (5 weeks) Current Progress:   Patient will complete MBSS to evaluate oropharyngeal swallow safety and efficiency, determine least restrictive diet and determine plan of care.     Progressing/ Not Met 5/8/2024   Established this date       Long Term Goals: (10 weeks) Current Progress:   Patient will maintain adequate hydration/nutrition with optimum safety and efficiency of swallowing function on PO intake without overt signs and symptoms of aspiration for least restrictive diet level.    Established this date         Plan     Recommended Treatment Plan:  Will determine need for therapy pending MBSS findings and recommendations.     Other Recommendations:   Modified Barium Swallow Study    Therapist's Name:   Noelle Peters CCC-SLP   5/8/2024

## 2024-05-08 NOTE — Clinical Note
Abby Suazo, I evaluated this patient this morning and he would benefit from a Modified Barium Swallow Study to further evaluate his swallow functioning. Do you mind placing the MBSS order?  Thanks! Noelle

## 2024-05-08 NOTE — PROGRESS NOTES
See initial evaluation in plan of care.     Noelle Peters MA, CCC-SLP  Speech Language Pathologist  5/8/2024

## 2024-05-09 ENCOUNTER — TELEPHONE (OUTPATIENT)
Dept: PULMONOLOGY | Facility: CLINIC | Age: 69
End: 2024-05-09
Payer: MEDICARE

## 2024-05-09 NOTE — TELEPHONE ENCOUNTER
Appt made and confirmed with patient for 05/15/2024 @10:00   Unable to assess. Patient is awake but non-verbal

## 2024-05-09 NOTE — TELEPHONE ENCOUNTER
----- Message from Camilla Portillo LPN sent at 5/9/2024  3:30 PM CDT -----  Contact: Crow  Patient just saw Dr. Vazquez in Feb. He has not seen tess since 2021  ----- Message -----  From: Don Dodd  Sent: 5/9/2024   3:25 PM CDT  To: Tess WARNER Staff    Pt is calling in regard to needing another nebulizer due to the one he had is missing.  Please call back at .[phone]       Thanks

## 2024-05-15 ENCOUNTER — OFFICE VISIT (OUTPATIENT)
Dept: PULMONOLOGY | Facility: CLINIC | Age: 69
End: 2024-05-15
Payer: MEDICARE

## 2024-05-15 VITALS
OXYGEN SATURATION: 92 % | WEIGHT: 198.44 LBS | HEART RATE: 82 BPM | HEIGHT: 65 IN | RESPIRATION RATE: 20 BRPM | DIASTOLIC BLOOD PRESSURE: 66 MMHG | BODY MASS INDEX: 33.06 KG/M2 | SYSTOLIC BLOOD PRESSURE: 132 MMHG

## 2024-05-15 DIAGNOSIS — J45.40 MODERATE PERSISTENT EXTRINSIC ASTHMA WITHOUT COMPLICATION: ICD-10-CM

## 2024-05-15 DIAGNOSIS — J44.9 CHRONIC OBSTRUCTIVE PULMONARY DISEASE, UNSPECIFIED COPD TYPE: Primary | ICD-10-CM

## 2024-05-15 PROCEDURE — 3044F HG A1C LEVEL LT 7.0%: CPT | Mod: CPTII,S$GLB,, | Performed by: HOSPITALIST

## 2024-05-15 PROCEDURE — 99999 PR PBB SHADOW E&M-EST. PATIENT-LVL V: CPT | Mod: PBBFAC,,, | Performed by: HOSPITALIST

## 2024-05-15 PROCEDURE — 3008F BODY MASS INDEX DOCD: CPT | Mod: CPTII,S$GLB,, | Performed by: HOSPITALIST

## 2024-05-15 PROCEDURE — 1160F RVW MEDS BY RX/DR IN RCRD: CPT | Mod: CPTII,S$GLB,, | Performed by: HOSPITALIST

## 2024-05-15 PROCEDURE — 3075F SYST BP GE 130 - 139MM HG: CPT | Mod: CPTII,S$GLB,, | Performed by: HOSPITALIST

## 2024-05-15 PROCEDURE — 1100F PTFALLS ASSESS-DOCD GE2>/YR: CPT | Mod: CPTII,S$GLB,, | Performed by: HOSPITALIST

## 2024-05-15 PROCEDURE — 3288F FALL RISK ASSESSMENT DOCD: CPT | Mod: CPTII,S$GLB,, | Performed by: HOSPITALIST

## 2024-05-15 PROCEDURE — 1159F MED LIST DOCD IN RCRD: CPT | Mod: CPTII,S$GLB,, | Performed by: HOSPITALIST

## 2024-05-15 PROCEDURE — 3078F DIAST BP <80 MM HG: CPT | Mod: CPTII,S$GLB,, | Performed by: HOSPITALIST

## 2024-05-15 PROCEDURE — 99213 OFFICE O/P EST LOW 20 MIN: CPT | Mod: S$GLB,,, | Performed by: HOSPITALIST
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
## 2024-05-16 DIAGNOSIS — E11.3219 TYPE 2 DIABETES MELLITUS WITH MILD NONPROLIFERATIVE RETINOPATHY AND MACULAR EDEMA, WITH LONG-TERM CURRENT USE OF INSULIN, UNSPECIFIED LATERALITY: ICD-10-CM

## 2024-05-16 DIAGNOSIS — E11.3299 TYPE 2 DIABETES MELLITUS WITH MILD NONPROLIFERATIVE RETINOPATHY, WITH LONG-TERM CURRENT USE OF INSULIN, MACULAR EDEMA PRESENCE UNSPECIFIED, UNSPECIFIED LATERALITY: ICD-10-CM

## 2024-05-16 DIAGNOSIS — Z79.4 TYPE 2 DIABETES MELLITUS WITH MILD NONPROLIFERATIVE RETINOPATHY, WITH LONG-TERM CURRENT USE OF INSULIN, MACULAR EDEMA PRESENCE UNSPECIFIED, UNSPECIFIED LATERALITY: ICD-10-CM

## 2024-05-16 DIAGNOSIS — E11.65 UNCONTROLLED TYPE 2 DIABETES MELLITUS WITH HYPERGLYCEMIA: ICD-10-CM

## 2024-05-16 DIAGNOSIS — Z79.4 TYPE 2 DIABETES MELLITUS WITH MILD NONPROLIFERATIVE RETINOPATHY AND MACULAR EDEMA, WITH LONG-TERM CURRENT USE OF INSULIN, UNSPECIFIED LATERALITY: ICD-10-CM

## 2024-05-16 NOTE — TELEPHONE ENCOUNTER
----- Message from Megan Dean sent at 5/16/2024  1:40 PM CDT -----  Contact: Crow  .Type:  RX Refill Request    Who Called: Crow   Refill or New Rx:refil   RX Name and Strength:  1. semaglutide (OZEMPIC) 2 mg/dose (8 mg/3 mL) PnIj    2.insulin aspart U-100 (NOVOLOG FLEXPEN U-100 INSULIN) 100 unit/mL (3 mL) InPn pen  How is the patient currently taking it? (ex. 1XDay):a prescribed   Is this a 30 day or 90 day RX:30 dayd   Preferred Pharmacy with phone number:.  [id number] - [facility] [place] - [address]  [address] [address]  Phone: [phone] Fax: [phone]  Local or Mail Order:local   Ordering Provider:Dawit   Would the patient rather a call back or a response via [facility]? Call   Best Call Back Number:.[phone]   Additional Information: pt requesting a call back

## 2024-05-17 ENCOUNTER — PATIENT MESSAGE (OUTPATIENT)
Dept: ALLERGY | Facility: CLINIC | Age: 69
End: 2024-05-17
Payer: MEDICARE

## 2024-05-21 ENCOUNTER — TELEPHONE (OUTPATIENT)
Dept: PAIN MEDICINE | Facility: CLINIC | Age: 69
End: 2024-05-21
Payer: MEDICARE

## 2024-05-23 ENCOUNTER — EXTERNAL HOME HEALTH (OUTPATIENT)
Dept: HOME HEALTH SERVICES | Facility: HOSPITAL | Age: 69
End: 2024-05-23
Payer: MEDICARE

## 2024-05-23 RX ORDER — SEMAGLUTIDE 2.68 MG/ML
2 INJECTION, SOLUTION SUBCUTANEOUS
Qty: 9 ML | Refills: 3 | Status: SHIPPED | OUTPATIENT
Start: 2024-05-23

## 2024-05-23 RX ORDER — INSULIN ASPART 100 [IU]/ML
INJECTION, SOLUTION INTRAVENOUS; SUBCUTANEOUS
Qty: 15 ML | Refills: 10 | Status: SHIPPED | OUTPATIENT
Start: 2024-05-23

## 2024-05-23 NOTE — TELEPHONE ENCOUNTER
No care due was identified.  [facility] Embedded Care Due Messages. Reference number: [id number].   5/23/2024 3:45:43 PM CDT

## 2024-06-04 ENCOUNTER — PATIENT MESSAGE (OUTPATIENT)
Dept: ALLERGY | Facility: CLINIC | Age: 69
End: 2024-06-04
Payer: MEDICARE

## 2024-06-05 ENCOUNTER — TELEPHONE (OUTPATIENT)
Dept: CARDIOLOGY | Facility: CLINIC | Age: 69
End: 2024-06-05
Payer: MEDICARE

## 2024-06-05 DIAGNOSIS — I50.42 CHRONIC COMBINED SYSTOLIC AND DIASTOLIC HEART FAILURE: Primary | ICD-10-CM

## 2024-06-05 NOTE — TELEPHONE ENCOUNTER
----- Message from Ehtan Berrios sent at 6/5/2024  8:16 AM CDT -----  Regarding: FW: ct order  Please advise  ----- Message -----  From: April Hilton RT  Sent: 6/5/2024   8:11 AM CDT  To: Pavel Chapa Staff  Subject: ct order                                         Hi can we get a stat creatinine ordered and scheduled for this patient. He is scheduled for Fri 6/7. We need the order scheduled at least an hour before his ct exam.  Thank you  April Hilton

## 2024-06-05 NOTE — TELEPHONE ENCOUNTER
LVM for pt to arrive to get labs 1 hr prior to CTA t//WN. Office call back provided for any questions or concerns.

## 2024-06-13 ENCOUNTER — HOSPITAL ENCOUNTER (EMERGENCY)
Facility: HOSPITAL | Age: 69
Discharge: HOME | End: 2024-06-14
Attending: EMERGENCY MEDICINE
Payer: MEDICARE

## 2024-06-13 ENCOUNTER — TELEPHONE (OUTPATIENT)
Dept: INTERNAL MEDICINE | Facility: CLINIC | Age: 69
End: 2024-06-13
Payer: MEDICARE

## 2024-06-13 DIAGNOSIS — J44.1 COPD WITH ACUTE EXACERBATION: Primary | ICD-10-CM

## 2024-06-13 DIAGNOSIS — R06.02 SOB (SHORTNESS OF BREATH): ICD-10-CM

## 2024-06-13 LAB
ALBUMIN SERPL BCP-MCNC: 2.8 G/DL (ref 3.5–5.2)
ALP SERPL-CCNC: 63 U/L (ref 55–135)
ALT SERPL W/O P-5'-P-CCNC: 18 U/L (ref 10–44)
ANION GAP SERPL CALC-SCNC: 10 MMOL/L (ref 8–16)
AST SERPL-CCNC: 32 U/L (ref 10–40)
BASOPHILS # BLD AUTO: 0.01 K/UL (ref 0–0.2)
BASOPHILS NFR BLD: 0.2 % (ref 0–1.9)
BILIRUB SERPL-MCNC: 0.2 MG/DL (ref 0.1–1)
BNP SERPL-MCNC: 73 PG/ML (ref 0–99)
BUN SERPL-MCNC: 19 MG/DL (ref 8–23)
CALCIUM SERPL-MCNC: 8.8 MG/DL (ref 8.7–10.5)
CHLORIDE SERPL-SCNC: 106 MMOL/L (ref 95–110)
CO2 SERPL-SCNC: 24 MMOL/L (ref 23–29)
CREAT SERPL-MCNC: 1.5 MG/DL (ref 0.5–1.4)
DIFFERENTIAL METHOD BLD: ABNORMAL
EOSINOPHIL # BLD AUTO: 0.1 K/UL (ref 0–0.5)
EOSINOPHIL NFR BLD: 2.1 % (ref 0–8)
ERYTHROCYTE [DISTWIDTH] IN BLOOD BY AUTOMATED COUNT: 14.6 % (ref 11.5–14.5)
EST. GFR  (NO RACE VARIABLE): 51 ML/MIN/1.73 M^2
GLUCOSE SERPL-MCNC: 109 MG/DL (ref 70–110)
HCT VFR BLD AUTO: 37.9 % (ref 40–54)
HGB BLD-MCNC: 12.4 G/DL (ref 14–18)
IMM GRANULOCYTES # BLD AUTO: 0.02 K/UL (ref 0–0.04)
IMM GRANULOCYTES NFR BLD AUTO: 0.3 % (ref 0–0.5)
INFLUENZA A, MOLECULAR: NEGATIVE
INFLUENZA B, MOLECULAR: NEGATIVE
LYMPHOCYTES # BLD AUTO: 1 K/UL (ref 1–4.8)
LYMPHOCYTES NFR BLD: 15.5 % (ref 18–48)
MCH RBC QN AUTO: 28.4 PG (ref 27–31)
MCHC RBC AUTO-ENTMCNC: 32.7 G/DL (ref 32–36)
MCV RBC AUTO: 87 FL (ref 82–98)
MONOCYTES # BLD AUTO: 0.3 K/UL (ref 0.3–1)
MONOCYTES NFR BLD: 4.5 % (ref 4–15)
NEUTROPHILS # BLD AUTO: 5.1 K/UL (ref 1.8–7.7)
NEUTROPHILS NFR BLD: 77.4 % (ref 38–73)
NRBC BLD-RTO: 0 /100 WBC
PLATELET # BLD AUTO: 185 K/UL (ref 150–450)
PMV BLD AUTO: 10.7 FL (ref 9.2–12.9)
POCT GLUCOSE: 169 MG/DL (ref 70–110)
POTASSIUM SERPL-SCNC: 4.2 MMOL/L (ref 3.5–5.1)
PROT SERPL-MCNC: 7.6 G/DL (ref 6–8.4)
RBC # BLD AUTO: 4.37 M/UL (ref 4.6–6.2)
SARS-COV-2 RDRP RESP QL NAA+PROBE: NEGATIVE
SODIUM SERPL-SCNC: 140 MMOL/L (ref 136–145)
SPECIMEN SOURCE: NORMAL
TROPONIN I SERPL DL<=0.01 NG/ML-MCNC: 0.19 NG/ML (ref 0–0.03)
TROPONIN I SERPL DL<=0.01 NG/ML-MCNC: 0.2 NG/ML (ref 0–0.03)
WBC # BLD AUTO: 6.6 K/UL (ref 3.9–12.7)

## 2024-06-13 PROCEDURE — 93010 ELECTROCARDIOGRAM REPORT: CPT | Mod: ,,, | Performed by: INTERNAL MEDICINE

## 2024-06-13 PROCEDURE — 27000221 HC OXYGEN, UP TO 24 HOURS

## 2024-06-13 PROCEDURE — 25000242 PHARM REV CODE 250 ALT 637 W/ HCPCS: Performed by: EMERGENCY MEDICINE

## 2024-06-13 PROCEDURE — 94640 AIRWAY INHALATION TREATMENT: CPT | Mod: XB

## 2024-06-13 PROCEDURE — 83880 ASSAY OF NATRIURETIC PEPTIDE: CPT | Performed by: EMERGENCY MEDICINE

## 2024-06-13 PROCEDURE — 99285 EMERGENCY DEPT VISIT HI MDM: CPT | Mod: 25

## 2024-06-13 PROCEDURE — 82962 GLUCOSE BLOOD TEST: CPT

## 2024-06-13 PROCEDURE — 80053 COMPREHEN METABOLIC PANEL: CPT | Performed by: EMERGENCY MEDICINE

## 2024-06-13 PROCEDURE — U0002 COVID-19 LAB TEST NON-CDC: HCPCS | Performed by: EMERGENCY MEDICINE

## 2024-06-13 PROCEDURE — 87502 INFLUENZA DNA AMP PROBE: CPT | Performed by: EMERGENCY MEDICINE

## 2024-06-13 PROCEDURE — 84484 ASSAY OF TROPONIN QUANT: CPT | Mod: 91 | Performed by: EMERGENCY MEDICINE

## 2024-06-13 PROCEDURE — 85025 COMPLETE CBC W/AUTO DIFF WBC: CPT | Performed by: EMERGENCY MEDICINE

## 2024-06-13 PROCEDURE — 94761 N-INVAS EAR/PLS OXIMETRY MLT: CPT

## 2024-06-13 PROCEDURE — 93005 ELECTROCARDIOGRAM TRACING: CPT

## 2024-06-13 PROCEDURE — 84484 ASSAY OF TROPONIN QUANT: CPT | Performed by: EMERGENCY MEDICINE

## 2024-06-13 RX ORDER — IPRATROPIUM BROMIDE AND ALBUTEROL SULFATE 2.5; .5 MG/3ML; MG/3ML
3 SOLUTION RESPIRATORY (INHALATION)
Status: COMPLETED | OUTPATIENT
Start: 2024-06-13 | End: 2024-06-13

## 2024-06-13 RX ORDER — PREDNISONE 20 MG/1
40 TABLET ORAL DAILY
Qty: 10 TABLET | Refills: 0 | Status: SHIPPED | OUTPATIENT
Start: 2024-06-13 | End: 2024-06-18

## 2024-06-13 RX ORDER — GABAPENTIN 400 MG/1
800 CAPSULE ORAL
Status: COMPLETED | OUTPATIENT
Start: 2024-06-13 | End: 2024-06-14

## 2024-06-13 RX ADMIN — IPRATROPIUM BROMIDE AND ALBUTEROL SULFATE 3 ML: 2.5; .5 SOLUTION RESPIRATORY (INHALATION) at 08:06

## 2024-06-13 NOTE — TELEPHONE ENCOUNTER
Spoke with Johanna the home health nurse concerning the patient . Johanna stated that the patient lungs sound bad so the patient was informed to go to the ER.

## 2024-06-13 NOTE — TELEPHONE ENCOUNTER
----- Message from Balbina Kent sent at 6/13/2024  4:34 PM CDT -----  .Type: Patient Call Back        Who called:     KARYN WITH [facility]   What is the request in detail:    HOME HEALTH NURSE CALLING CONCERNING POSSIBLE BAD UPPER RESPIRATORY INFECTION L.   Can the clinic reply by [facility]?           Would the patient rather a call back or a response via [facility]?   CALL MAURIZIO      Best call back number:

## 2024-06-14 VITALS
RESPIRATION RATE: 19 BRPM | TEMPERATURE: 98 F | HEIGHT: 65 IN | OXYGEN SATURATION: 97 % | WEIGHT: 191.81 LBS | SYSTOLIC BLOOD PRESSURE: 170 MMHG | BODY MASS INDEX: 31.96 KG/M2 | DIASTOLIC BLOOD PRESSURE: 78 MMHG | HEART RATE: 88 BPM

## 2024-06-14 LAB
OHS QRS DURATION: 112 MS
OHS QTC CALCULATION: 486 MS

## 2024-06-14 PROCEDURE — 25000003 PHARM REV CODE 250: Performed by: EMERGENCY MEDICINE

## 2024-06-14 RX ADMIN — GABAPENTIN 800 MG: 400 CAPSULE ORAL at 12:06

## 2024-06-14 NOTE — ED NOTES
While assessing the pt, an insect that appeared to be a bedbug was seen crawling on the pt's left pant leg. The bug was then captured and placed in a specimen cup. MD and charge nurse were notified. Specimen was then given to MITCH.

## 2024-06-14 NOTE — ED PROVIDER NOTES
"SCRIBE #1 NOTE: I, Charmaine Duron, am scribing for, and in the presence of, Ewelina Kay MD. I have scribed the entire note.       History     Chief Complaint   Patient presents with    Shortness of Breath     Pt reports SOB and non productive cough since Sunday. Hx COPD ad Asthma. Received 125mg Solumedrol and 1 duoneb enroute      Review of patient's allergies indicates:   Allergen Reactions    Protamine Hives     Urticaria, possible upper airway swelling         History of Present Illness     HPI    6/13/2024, 7:** PM  History obtained from the {adult relatives:13382::"patient"}      History of Present Illness: Crow Green is a 68 y.o. male patient with a PMHx of HTN, depression, arthritis, asthma, DM II, Afib, CHF, COPD, MI, carotid artery stenosis, and elevated coronary artery calcium who presents to the Emergency Department for evaluation of SOB which onset gradually***. Symptoms are constant*** and moderate*** in severity. No mitigating or exacerbating factors reported***. Associated sxs include ***. Patient denies any ***, and all other sxs at this time. Prior Tx includes solumderol and 1 duoneb enroute. No further complaints or concerns at this time.       Arrival mode: Ambulance service    PCP: Cm Polanco MD        Past Medical History:  Past Medical History:   Diagnosis Date    Arthritis     Asthma     Atrial fibrillation     Atrial fibrillation with RVR 08/07/2019    Carotid artery stenosis     CHF (congestive heart failure)     Chronic obstructive pulmonary disease 08/05/2020    Depression     Dermatomyositis     Diabetes mellitus, type 2 Diagnosed in 2000    Elevated coronary artery calcium score 02/14/2024    Elevated PSA     Follow up 07/30/2020    Hypertension     Myocardial infarction     2017    Sleep apnea        Past Surgical History:  Past Surgical History:   Procedure Laterality Date    ABLATION OF ARRHYTHMOGENIC FOCUS FOR ATRIAL FIBRILLATION N/A 2/27/2020    Procedure: " Ablation atrial fibrillation;  Surgeon: Gio Brown MD;  Location: [facility] EP LAB;  Service: Cardiology;  Laterality: N/A;  afib, DAMIEN (cx if SR), PVI, PITO, anes, MB, 3 Prep    CHOLECYSTECTOMY      CLOSURE OF WOUND Left 7/1/2020    Procedure: CLOSURE, WOUND;  Surgeon: Barb Veras DPM;  Location: [facility] OR;  Service: Podiatry;  Laterality: Left;    COLONOSCOPY N/A 3/4/2022    Procedure: COLONOSCOPY;  Surgeon: Yolis Freitas MD;  Location: [facility];  Service: Endoscopy;  Laterality: N/A;    ESOPHAGOGASTRODUODENOSCOPY N/A 3/4/2022    Procedure: EGD (ESOPHAGOGASTRODUODENOSCOPY);  Surgeon: Yolis Freitas MD;  Location: [facility];  Service: Endoscopy;  Laterality: N/A;    INJECTION OF ANESTHETIC AGENT INTO SACROILIAC JOINT Left 3/24/2021    Procedure: Left BLOCK, SACROILIAC JOINT and bilateral rhomboid TPI;  Surgeon: Dillan Tsai MD;  Location: [facility] PAIN MGT;  Service: Pain Management;  Laterality: Left;    INTRALUMINAL GASTROINTESTINAL TRACT IMAGING VIA CAPSULE N/A 3/22/2022    Procedure: IMAGING PROCEDURE, GI TRACT, INTRALUMINAL, VIA CAPSULE;  Surgeon: Justice Martinez RN;  Location: [facility];  Service: Endoscopy;  Laterality: N/A;    REVERSE TOTAL SHOULDER ARTHROPLASTY Left 1/10/2022    Procedure: ARTHROPLASTY, SHOULDER, TOTAL, REVERSE;  Surgeon: Anthony Alvarado MD;  [facility];  Service: Orthopedics;  Laterality: Left;  left reverse total shoulder arthroplasty     SELECTIVE INJECTION OF ANESTHETIC AGENT AROUND LUMBAR SPINAL NERVE ROOT BY TRANSFORAMINAL APPROACH Left 6/11/2020    Procedure: BLOCK, SPINAL NERVE ROOT, LUMBAR, SELECTIVE, TRANSFORAMINAL APPROACH Left L4-5, L5-S1 TFESI with RN IV sedation;  Surgeon: Dillan Tsai MD;  Location: [facility] PAIN [facility]GT;  Service: Pain Management;  Laterality: Left;    TOE AMPUTATION Left 6/29/2020    Procedure: AMPUTATION, TOE;  Surgeon: Barb Veras DPM;  Location: [facility] OR;  Service: Podiatry;  Laterality: Left;  great toe         Family  History:  Family History   Problem Relation Name Age of Onset    Hypertension Mother      Glaucoma Mother      Cataracts Mother      Diabetes Mother      Cataracts Father      Stroke Father      Glaucoma Sister 4         x3    Cataracts Sister 4         x3    Diabetes Sister 4         x1    Stroke Sister 4         x1    No Known Problems Brother 2     No Known Problems Daughter 2     No Known Problems Son 3     Glaucoma Maternal Aunt      Diabetes Maternal Aunt      Cancer Maternal Grandfather          lung (smoker)    Prostate cancer Neg Hx      Heart disease Neg Hx      Kidney disease Neg Hx         Social History:  Social History     Tobacco Use    Smoking status: Never    Smokeless tobacco: Never   Substance and Sexual Activity    Alcohol use: Yes     Comment: rare, maybe holidays    Drug use: Not Currently     Types: Cocaine    Sexual activity: Not Currently     Partners: Female        Review of Systems     Review of Systems  ***   Physical Exam     Initial Vitals [06/13/24 1908]   BP Pulse Resp Temp SpO2   (!) 110/50 80 (!) 24 98.9 °F (37.2 °C) 98 %      MAP       --          Physical Exam  Nursing Notes and Vital Signs Reviewed.  Constitutional: Patient is in {DISTRESS LEVEL:29024}. Well-developed and well-nourished.  Head: Atraumatic. Normocephalic.  Eyes: PERRL. EOM intact. Conjunctivae are not pale. No scleral icterus.  ENT: Mucous membranes are moist. Oropharynx is clear and symmetric***.    Neck: Supple. Full ROM. No lymphadenopathy.  Cardiovascular: Regular rate. Regular rhythm***. No murmurs, rubs, or gallops. Distal pulses are 2+ and symmetric***.  Pulmonary/Chest: No respiratory distress. Clear to auscultation bilaterally***. No wheezing or rales.  Abdominal: Soft and non-distended.  There is no tenderness.  No rebound, guarding, or rigidity. Good bowel sounds***.  Genitourinary: No*** CVA tenderness  Musculoskeletal: Moves all extremities. No obvious deformities. No edema. No calf  "tenderness***.  Skin: Warm and dry.  Neurological:  Alert, awake, and appropriate.  Normal speech.  No acute focal neurological deficits are appreciated.  Psychiatric: Normal affect. Good eye contact. Appropriate in content.     ED Course   Procedures  ED Vital Signs:  Vitals:    06/13/24 1908 06/13/24 1913 06/13/24 1914 06/13/24 1926   BP: (!) 110/50 126/75     Pulse: 80  78    Resp: (!) 24      Temp: 98.9 °F (37.2 °C)      TempSrc: Oral      SpO2: 98%  98%    Weight:    87 kg (191 lb 12.8 oz)   Height: 5' 5" (1.651 m)          Abnormal Lab Results:  Labs Reviewed   CBC W/ AUTO DIFFERENTIAL - Abnormal; Notable for the following components:       Result Value    RBC 4.37 (*)     Hemoglobin 12.4 (*)     Hematocrit 37.9 (*)     RDW 14.6 (*)     Gran % 77.4 (*)     Lymph % 15.5 (*)     All other components within normal limits   COMPREHENSIVE METABOLIC PANEL   TROPONIN I   B-TYPE NATRIURETIC PEPTIDE        All Lab Results:  Results for orders placed or performed during the hospital encounter of 06/13/24   CBC auto differential   Result Value Ref Range    WBC 6.60 3.90 - 12.70 K/uL    RBC 4.37 (L) 4.60 - 6.20 M/uL    Hemoglobin 12.4 (L) 14.0 - 18.0 g/dL    Hematocrit 37.9 (L) 40.0 - 54.0 %    MCV 87 82 - 98 fL    MCH 28.4 27.0 - 31.0 pg    MCHC 32.7 32.0 - 36.0 g/dL    RDW 14.6 (H) 11.5 - 14.5 %    Platelets 185 150 - 450 K/uL    MPV 10.7 9.2 - 12.9 fL    Immature Granulocytes 0.3 0.0 - 0.5 %    Gran # (ANC) 5.1 1.8 - 7.7 K/uL    Immature Grans (Abs) 0.02 0.00 - 0.04 K/uL    Lymph # 1.0 1.0 - 4.8 K/uL    Mono # 0.3 0.3 - 1.0 K/uL    Eos # 0.1 0.0 - 0.5 K/uL    Baso # 0.01 0.00 - 0.20 K/uL    nRBC 0 0 /100 WBC    Gran % 77.4 (H) 38.0 - 73.0 %    Lymph % 15.5 (L) 18.0 - 48.0 %    Mono % 4.5 4.0 - 15.0 %    Eosinophil % 2.1 0.0 - 8.0 %    Basophil % 0.2 0.0 - 1.9 %    Differential Method Automated      *Note: Due to a large number of results and/or encounters for the requested time period, some results have not been " displayed. A complete set of results can be found in Results Review.         Imaging Results:  Imaging Results    None          The EKG was ordered, reviewed, and independently interpreted by the ED provider.  Interpretation time: 19:19  Rate: 80 BPM  Rhythm:  Sinus rhythm with 1st degree AV block  Interpretation: Left anterior fascicular block. No STEMI.           The Emergency Provider reviewed the vital signs and test results, which are outlined above.     ED Discussion       ***       Medical Decision Making  Amount and/or Complexity of Data Reviewed  Labs: ordered. Decision-making details documented in ED Course.  Radiology: ordered. Decision-making details documented in ED Course.  ECG/medicine tests: ordered and independent interpretation performed. Decision-making details documented in ED Course.                ED Medication(s):  Medications - No data to display    New Prescriptions    No medications on file               Scribe Attestation:   Scribe #1: I performed the above scribed service and the documentation accurately describes the services I performed. I attest to the accuracy of the note.     Attending:   Physician Attestation Statement for Scribe #1: I, Ewelina Kay MD, personally performed the services described in this documentation, as scribed by Charmaine Duron, in my presence, and it is both accurate and complete.           Clinical Impression       ICD-10-CM ICD-9-CM   1. SOB (shortness of breath)  R06.02 786.05

## 2024-06-14 NOTE — ED NOTES
AAOx4, respirations even and unlabored, skin warm and dry, no apparent distress noted. Patient denies any questions upon discharge

## 2024-06-14 NOTE — ED NOTES
Pt was completely undressed, bathed, and all belongings were double-bagged and labeled following the pest control protocol at this time. Pt was then moved to a clean room.

## 2024-06-14 NOTE — ED PROVIDER NOTES
SCRIBE #1 NOTE: I, Adrian Lemus, am scribing for, and in the presence of, Marina Mtz MD. I have scribed the entire note.       History     Chief Complaint   Patient presents with    Shortness of Breath     Pt reports SOB and non productive cough since Sunday. Hx COPD ad Asthma. Received 125mg Solumedrol and 1 duoneb enroute      Review of patient's allergies indicates:   Allergen Reactions    Protamine Hives     Urticaria, possible upper airway swelling         History of Present Illness     HPI    6/13/2024, 8:31 PM  History obtained from the patient      History of Present Illness: Crow Green is a 68 y.o. male patient with a PMHx of A-fib, asthma, COPD, CHF, T2DM, HTN who presents to the Emergency Department for evaluation of SOB which onset gradually 4 days ago. Symptoms are constant and moderate in severity. SOB is worsened with lying down and is unchanged with exertion. Associated sxs include non-productive cough. Patient denies any fever, N/V, any smoking hx or contact, and all other sxs at this time. Prior Tx includes breathing tx at home and 125 mg Solumedrol and 1 duoneb en route with no relief. No further complaints or concerns at this time.       Arrival mode: EMS      PCP: Cm Polanco MD        Past Medical History:  Past Medical History:   Diagnosis Date    Arthritis     Asthma     Atrial fibrillation     Atrial fibrillation with RVR 08/07/2019    Carotid artery stenosis     CHF (congestive heart failure)     Chronic obstructive pulmonary disease 08/05/2020    Depression     Dermatomyositis     Diabetes mellitus, type 2 Diagnosed in 2000    Elevated coronary artery calcium score 02/14/2024    Elevated PSA     Follow up 07/30/2020    Hypertension     Myocardial infarction     2017    Sleep apnea        Past Surgical History:  Past Surgical History:   Procedure Laterality Date    ABLATION OF ARRHYTHMOGENIC FOCUS FOR ATRIAL FIBRILLATION N/A 2/27/2020    Procedure: Ablation atrial  fibrillation;  Surgeon: Gio Bronw MD;  Location: [facility] EP LAB;  Service: Cardiology;  Laterality: N/A;  afib, DAMIEN (cx if SR), PVI, PITO, anes, MB, 3 Prep    CHOLECYSTECTOMY      CLOSURE OF WOUND Left 7/1/2020    Procedure: CLOSURE, WOUND;  Surgeon: Barb Veras DPM;  Location: [facility];  Service: Podiatry;  Laterality: Left;    COLONOSCOPY N/A 3/4/2022    Procedure: COLONOSCOPY;  Surgeon: Yolis Freitas MD;  Location: [facility];  Service: Endoscopy;  Laterality: N/A;    ESOPHAGOGASTRODUODENOSCOPY N/A 3/4/2022    Procedure: EGD (ESOPHAGOGASTRODUODENOSCOPY);  Surgeon: Yolis Freitas MD;  Location: [facility];  Service: Endoscopy;  Laterality: N/A;    INJECTION OF ANESTHETIC AGENT INTO SACROILIAC JOINT Left 3/24/2021    Procedure: Left BLOCK, SACROILIAC JOINT and bilateral rhomboid TPI;  Surgeon: Dillan Tsai MD;  Location: [facility] PAIN MGT;  Service: Pain Management;  Laterality: Left;    INTRALUMINAL GASTROINTESTINAL TRACT IMAGING VIA CAPSULE N/A 3/22/2022    Procedure: IMAGING PROCEDURE, GI TRACT, INTRALUMINAL, VIA CAPSULE;  Surgeon: Justice Martinez RN;  Location: [facility];  Service: Endoscopy;  Laterality: N/A;    REVERSE TOTAL SHOULDER ARTHROPLASTY Left 1/10/2022    Procedure: ARTHROPLASTY, SHOULDER, TOTAL, REVERSE;  Surgeon: Anthony Alvarado MD;  Location: [facility];  Service: Orthopedics;  Laterality: Left;  left reverse total shoulder arthroplasty     SELECTIVE INJECTION OF ANESTHETIC AGENT AROUND LUMBAR SPINAL NERVE ROOT BY TRANSFORAMINAL APPROACH Left 6/11/2020    Procedure: BLOCK, SPINAL NERVE ROOT, LUMBAR, SELECTIVE, TRANSFORAMINAL APPROACH Left L4-5, L5-S1 TFESI with RN IV sedation;  Surgeon: Dillan Tsai MD;  Location: [facility] PAIN [facility]GT;  Service: Pain Management;  Laterality: Left;    TOE AMPUTATION Left 6/29/2020    Procedure: AMPUTATION, TOE;  Surgeon: Barb Veras DPM;  Location: [facility];  Service: Podiatry;  Laterality: Left;  great toe         Family History:  Family History    Problem Relation Name Age of Onset    Hypertension Mother      Glaucoma Mother      Cataracts Mother      Diabetes Mother      Cataracts Father      Stroke Father      Glaucoma Sister 4         x3    Cataracts Sister 4         x3    Diabetes Sister 4         x1    Stroke Sister 4         x1    No Known Problems Brother 2     No Known Problems Daughter 2     No Known Problems Son 3     Glaucoma Maternal Aunt      Diabetes Maternal Aunt      Cancer Maternal Grandfather          lung (smoker)    Prostate cancer Neg Hx      Heart disease Neg Hx      Kidney disease Neg Hx         Social History:  Social History     Tobacco Use    Smoking status: Never    Smokeless tobacco: Never   Substance and Sexual Activity    Alcohol use: Yes     Comment: rare, maybe holidays    Drug use: Not Currently     Types: Cocaine    Sexual activity: Not Currently     Partners: Female        Review of Systems     Review of Systems   Constitutional:  Negative for fever.   HENT:  Negative for sore throat.    Respiratory:  Positive for cough and shortness of breath.    Cardiovascular:  Negative for chest pain.   Gastrointestinal:  Negative for nausea and vomiting.   Genitourinary:  Negative for dysuria.   Musculoskeletal:  Negative for back pain.   Skin:  Negative for rash.   Neurological:  Negative for weakness.   Hematological:  Does not bruise/bleed easily.   All other systems reviewed and are negative.       Physical Exam     Initial Vitals [06/13/24 1908]   BP Pulse Resp Temp SpO2   (!) 110/50 80 (!) 24 98.9 °F (37.2 °C) 98 %      MAP       --          Physical Exam   Nursing Notes and Vital Signs Reviewed.  Constitutional: Patient is in no acute distress. Well-developed and well-nourished.  Head: Atraumatic. Normocephalic.  Eyes: PERRL. EOM intact. Conjunctivae are not pale. No scleral icterus.  ENT: Mucous membranes are moist. Oropharynx is clear and symmetric.    Neck: Supple. Full ROM. No lymphadenopathy.  Cardiovascular: Regular rate.  "Regular rhythm. No murmurs, rubs, or gallops. Distal pulses are 2+ and symmetric.  Pulmonary/Chest: Tachypneic. Bilateral wheezes and rhonchi.  Abdominal: Soft and non-distended.  There is no tenderness.  No rebound, guarding, or rigidity. Good bowel sounds.  Genitourinary: No CVA tenderness  Musculoskeletal: Moves all extremities. No obvious deformities. No edema. No calf tenderness. Left great toe amputation.   Skin: Warm and dry.  Neurological:  Alert, awake, and appropriate.  Normal speech.  No acute focal neurological deficits are appreciated.  Psychiatric: Normal affect. Good eye contact. Appropriate in content.     ED Course   Procedures  ED Vital Signs:  Vitals:    06/13/24 1908 06/13/24 1913 06/13/24 1914 06/13/24 1926   BP: (!) 110/50 126/75     Pulse: 80  78    Resp: (!) 24      Temp: 98.9 °F (37.2 °C)      TempSrc: Oral      SpO2: 98%  98%    Weight:    87 kg (191 lb 12.8 oz)   Height: 5' 5" (1.651 m)       06/13/24 2033 06/13/24 2037 06/13/24 2041 06/13/24 2130   BP:    (!) 164/79   Pulse: 79 79 78 86   Resp: (!) 21 20 20 14   Temp:       TempSrc:       SpO2: 98% 100% 100% 100%   Weight:       Height:        06/13/24 2200 06/14/24 0000 06/14/24 0045   BP: (!) 175/93 (!) 168/77 (!) 170/78   Pulse: 87 84 88   Resp: 20 20 19   Temp:  98.1 °F (36.7 °C)    TempSrc:  Oral    SpO2: 99% 98% 97%   Weight:      Height:          Abnormal Lab Results:  Labs Reviewed   CBC W/ AUTO DIFFERENTIAL - Abnormal; Notable for the following components:       Result Value    RBC 4.37 (*)     Hemoglobin 12.4 (*)     Hematocrit 37.9 (*)     RDW 14.6 (*)     Gran % 77.4 (*)     Lymph % 15.5 (*)     All other components within normal limits   COMPREHENSIVE METABOLIC PANEL - Abnormal; Notable for the following components:    Creatinine 1.5 (*)     Albumin 2.8 (*)     eGFR 51 (*)     All other components within normal limits   TROPONIN I - Abnormal; Notable for the following components:    Troponin I 0.196 (*)     All other " components within normal limits   TROPONIN I - Abnormal; Notable for the following components:    Troponin I 0.194 (*)     All other components within normal limits   POCT GLUCOSE - Abnormal; Notable for the following components:    POCT Glucose 169 (*)     All other components within normal limits   INFLUENZA A & B BY MOLECULAR   B-TYPE NATRIURETIC PEPTIDE   SARS-COV-2 RNA AMPLIFICATION, QUAL        All Lab Results:  Results for orders placed or performed during the hospital encounter of 06/13/24   Influenza A & B by Molecular    Specimen: Nasopharyngeal Swab   Result Value Ref Range    Influenza A, Molecular Negative Negative    Influenza B, Molecular Negative Negative    Flu A & B Source Nasal swab    CBC auto differential   Result Value Ref Range    WBC 6.60 3.90 - 12.70 K/uL    RBC 4.37 (L) 4.60 - 6.20 M/uL    Hemoglobin 12.4 (L) 14.0 - 18.0 g/dL    Hematocrit 37.9 (L) 40.0 - 54.0 %    MCV 87 82 - 98 fL    MCH 28.4 27.0 - 31.0 pg    MCHC 32.7 32.0 - 36.0 g/dL    RDW 14.6 (H) 11.5 - 14.5 %    Platelets 185 150 - 450 K/uL    MPV 10.7 9.2 - 12.9 fL    Immature Granulocytes 0.3 0.0 - 0.5 %    Gran # (ANC) 5.1 1.8 - 7.7 K/uL    Immature Grans (Abs) 0.02 0.00 - 0.04 K/uL    Lymph # 1.0 1.0 - 4.8 K/uL    Mono # 0.3 0.3 - 1.0 K/uL    Eos # 0.1 0.0 - 0.5 K/uL    Baso # 0.01 0.00 - 0.20 K/uL    nRBC 0 0 /100 WBC    Gran % 77.4 (H) 38.0 - 73.0 %    Lymph % 15.5 (L) 18.0 - 48.0 %    Mono % 4.5 4.0 - 15.0 %    Eosinophil % 2.1 0.0 - 8.0 %    Basophil % 0.2 0.0 - 1.9 %    Differential Method Automated    Comprehensive metabolic panel   Result Value Ref Range    Sodium 140 136 - 145 mmol/L    Potassium 4.2 3.5 - 5.1 mmol/L    Chloride 106 95 - 110 mmol/L    CO2 24 23 - 29 mmol/L    Glucose 109 70 - 110 mg/dL    BUN 19 8 - 23 mg/dL    Creatinine 1.5 (H) 0.5 - 1.4 mg/dL    Calcium 8.8 8.7 - 10.5 mg/dL    Total Protein 7.6 6.0 - 8.4 g/dL    Albumin 2.8 (L) 3.5 - 5.2 g/dL    Total Bilirubin 0.2 0.1 - 1.0 mg/dL    Alkaline  Phosphatase 63 55 - 135 U/L    AST 32 10 - 40 U/L    ALT 18 10 - 44 U/L    eGFR 51 (A) >60 mL/min/1.73 m^2    Anion Gap 10 8 - 16 mmol/L   Troponin I #1   Result Value Ref Range    Troponin I 0.196 (H) 0.000 - 0.026 ng/mL   BNP   Result Value Ref Range    BNP 73 0 - 99 pg/mL   COVID-19 Rapid Screening   Result Value Ref Range    SARS-CoV-2 RNA, Amplification, Qual Negative Negative   Troponin I   Result Value Ref Range    Troponin I 0.194 (H) 0.000 - 0.026 ng/mL   EKG 12-lead   Result Value Ref Range    QRS Duration 112 ms    [facility] QTC Calculation 486 ms   POCT glucose   Result Value Ref Range    POCT Glucose 169 (H) 70 - 110 mg/dL     *Note: Due to a large number of results and/or encounters for the requested time period, some results have not been displayed. A complete set of results can be found in Results Review.         Imaging Results:  Imaging Results              X-Ray Chest AP Portable (Final result)  Result time 06/13/24 20:30:22      Final result by Jade Farrell MD (06/13/24 20:30:22)                   Impression:      Under inflation      Electronically signed by: Jade Farrell  Date:    06/13/2024  Time:    20:30               Narrative:    EXAMINATION:  XR CHEST AP PORTABLE    CLINICAL HISTORY:  shortness of breath;    TECHNIQUE:  Single frontal portable view of the chest was performed.    COMPARISON:  None    FINDINGS:  Lungs are underinflated without sizable consolidation or pleural effusion                                       The EKG was ordered, reviewed, and independently interpreted by the ED provider.  Interpretation time: 19:19  Rate: 80 BPM  Rhythm: Sinus rhythm with 1st degree AV block.   Interpretation: Left anterior fascicular block. Cannot rule out Inferior infarct (masked by fascicular block?). No STEMI.    The Emergency Provider reviewed the vital signs and test results, which are outlined above.     ED Discussion       11:10 PM: Reassessed pt at this time. Discussed with pt  all pertinent ED information and results. Discussed pt dx and plan of tx. Gave pt all f/u and return to the ED instructions. All questions and concerns were addressed at this time. Pt expresses understanding of information and instructions, and is comfortable with plan to discharge. Pt is stable for discharge.    I discussed with patient and/or family/caretaker that evaluation in the ED does not suggest any emergent or life threatening medical conditions requiring immediate intervention beyond what was provided in the ED, and I believe patient is safe for discharge.  Regardless, an unremarkable evaluation in the ED does not preclude the development or presence of a serious of life threatening condition. As such, patient was instructed to return immediately for any worsening or change in current symptoms.         Medical Decision Making  Problems Addressed:  COPD with acute exacerbation: chronic illness or injury with exacerbation, progression, or side effects of treatment    Amount and/or Complexity of Data Reviewed  Labs: ordered. Decision-making details documented in ED Course.  Radiology: ordered. Decision-making details documented in ED Course.  ECG/medicine tests: ordered and independent interpretation performed. Decision-making details documented in ED Course.    Risk  Prescription drug management.                 ED Medication(s):  Medications   albuterol-ipratropium 2.5 mg-0.5 mg/3 mL nebulizer solution 3 mL (3 mLs Nebulization Given 6/13/24 2041)   gabapentin capsule 800 mg (800 mg Oral Given 6/14/24 0005)       Discharge Medication List as of 6/13/2024 11:05 PM        START taking these medications    Details   predniSONE (DELTASONE) 20 MG tablet Take 2 tablets (40 mg total) by mouth once daily. for 5 days, Starting Thu 6/13/2024, Until Tue 6/18/2024, Print              Follow-up Information       Cm Polanco MD In 1 day.    Specialty: Family Medicine  Contact information:  [id number] [facility]  [place]  [place] [address]  [phone]               [facility]    Specialty: Emergency Medicine  Why: As needed, If symptoms worsen  Contact information:  [address]  [facility] [address] [phone]                               Scribe Attestation:   Scribe #1: I performed the above scribed service and the documentation accurately describes the services I performed. I attest to the accuracy of the note.     Attending:   Physician Attestation Statement for Scribe #1: I, Marina Mtz MD, personally performed the services described in this documentation, as scribed by Adrian Lemus, in my presence, and it is both accurate and complete.           Clinical Impression       ICD-10-CM ICD-9-CM   1. COPD with acute exacerbation  J44.1 491.21   2. SOB (shortness of breath)  R06.02 786.05       Disposition:   Disposition: Discharged  Condition: Stable         Marina Mtz MD  06/15/24 8458

## 2024-06-24 ENCOUNTER — TELEPHONE (OUTPATIENT)
Dept: INTERNAL MEDICINE | Facility: CLINIC | Age: 69
End: 2024-06-24
Payer: MEDICARE

## 2024-06-24 NOTE — TELEPHONE ENCOUNTER
----- Message from April Tirado sent at 6/24/2024 12:23 PM CDT -----  Regarding: concerns / same day  Name of who is calling:   Crow      What is the request in detail: Pt is requesting a call back in ref to being seen today or asap due to severe coughing and it wont stop for at 2 weeks / please call or call something in asap       Can the clinic reply by [facility]:no      What number to call back if not [facility]: [phone]

## 2024-06-27 ENCOUNTER — TELEPHONE (OUTPATIENT)
Dept: INTERNAL MEDICINE | Facility: CLINIC | Age: 69
End: 2024-06-27
Payer: MEDICARE

## 2024-06-27 NOTE — TELEPHONE ENCOUNTER
----- Message from Meri Velazquez sent at 6/27/2024  8:41 AM CDT -----  Patient called in regards to schedule an appointment. Patient not feeling welll will like a call back [phone]

## 2024-06-27 NOTE — TELEPHONE ENCOUNTER
Spoke with the patient stated that he is not feeling well. The patient stated that the cough has been 3 weeks. The patient was informed that the providers do not have any open appointment slots. The patient was informed that he can visit the [facility] urgent care clinic or ER. The patient stated understanding

## 2024-07-05 ENCOUNTER — TELEPHONE (OUTPATIENT)
Dept: INTERNAL MEDICINE | Facility: CLINIC | Age: 69
End: 2024-07-05
Payer: MEDICARE

## 2024-07-05 NOTE — TELEPHONE ENCOUNTER
----- Message from Simran Rodriguez sent at 7/5/2024 12:43 PM CDT -----  Contact: Crow Wright called in regards to he says the medication prescribed is not working and he has been coughing for a month. States that he can't wait for 8/20/24 which is the first availability appointment. Call back is  [phone]

## 2024-07-10 ENCOUNTER — OFFICE VISIT (OUTPATIENT)
Dept: PODIATRY | Facility: CLINIC | Age: 69
End: 2024-07-10
Payer: MEDICARE

## 2024-07-10 VITALS — BODY MASS INDEX: 31.96 KG/M2 | WEIGHT: 191.81 LBS | HEIGHT: 65 IN

## 2024-07-10 DIAGNOSIS — S98.112A AMPUTATED GREAT TOE, LEFT: ICD-10-CM

## 2024-07-10 DIAGNOSIS — E11.49 TYPE II DIABETES MELLITUS WITH NEUROLOGICAL MANIFESTATIONS: Primary | ICD-10-CM

## 2024-07-10 DIAGNOSIS — B35.1 DERMATOPHYTOSIS OF NAIL: ICD-10-CM

## 2024-07-10 DIAGNOSIS — L84 PRE-ULCERATIVE CALLUSES: ICD-10-CM

## 2024-07-10 PROCEDURE — 99999 PR PBB SHADOW E&M-EST. PATIENT-LVL IV: CPT | Mod: PBBFAC,,, | Performed by: PODIATRIST

## 2024-07-10 PROCEDURE — 99499 UNLISTED E&M SERVICE: CPT | Mod: S$GLB,,, | Performed by: PODIATRIST

## 2024-07-10 PROCEDURE — 11056 PARNG/CUTG B9 HYPRKR LES 2-4: CPT | Mod: Q7,S$GLB,, | Performed by: PODIATRIST

## 2024-07-10 PROCEDURE — 11721 DEBRIDE NAIL 6 OR MORE: CPT | Mod: 59,Q7,S$GLB, | Performed by: PODIATRIST

## 2024-07-12 ENCOUNTER — TELEPHONE (OUTPATIENT)
Dept: INTERNAL MEDICINE | Facility: CLINIC | Age: 69
End: 2024-07-12
Payer: MEDICARE

## 2024-07-12 NOTE — TELEPHONE ENCOUNTER
Spoke with the patient stated that he have a bad cough. The patient was scheduled an appointment with Ms Sebastian on 7/16/24 at 10:40 am. The patient stated understanding.

## 2024-07-12 NOTE — TELEPHONE ENCOUNTER
----- Message from Yenifer Rea sent at 7/12/2024  4:12 PM CDT -----  Contact: [phone]  Pt states he tried to call two months ago re a cough he has and no one has returned his call. Please call pt to discuss.

## 2024-07-15 ENCOUNTER — TELEPHONE (OUTPATIENT)
Dept: PAIN MEDICINE | Facility: CLINIC | Age: 69
End: 2024-07-15
Payer: MEDICARE

## 2024-07-15 NOTE — TELEPHONE ENCOUNTER
----- Message from Jovanny Kwok MA sent at 7/15/2024  9:32 AM CDT -----  Contact: [email or web address] [phone]  Pt called                  Pt is requesting a call back to get upcoming appt on July 17th to be rescheduled to July 18 morning time if possible.

## 2024-07-15 NOTE — TELEPHONE ENCOUNTER
Called patient in regards to a call abck. Patient best call back number is not working. Tried another phone number on file and could not leave voicemail.    Danita Chand MA

## 2024-07-15 NOTE — TELEPHONE ENCOUNTER
----- Message from Sophia Hale sent at 7/15/2024 10:41 AM CDT -----  Contact: Crow  Type:  Patient Returning Call    Who Called: Crow   Who Left Message for Patient:Danita Chand MA  Does the patient know what this is regarding?: Missed call   Would the patient rather a call back or a response via [facility]?  Call back   Best Call Back Number: [phone]  Additional Information:

## 2024-07-15 NOTE — TELEPHONE ENCOUNTER
Called patient  in regards to a call back. Patient stated he would like to reschedule upcoming appointment due to him not having transportation. Patient has been scheduled on 07/24/24 [facility] Patient verbalized understanding.    Danita Chand MA

## 2024-07-16 ENCOUNTER — TELEPHONE (OUTPATIENT)
Dept: DIABETES | Facility: CLINIC | Age: 69
End: 2024-07-16
Payer: MEDICARE

## 2024-07-16 NOTE — TELEPHONE ENCOUNTER
Spoke with patient he inform me that his dex com senor's are not lasting for 10 days so I inform pt that he needs to call dex com explain that his senor is going out before his time pt verbally understood instructions                 ----- Message from Neyda Bashir sent at 7/16/2024 10:36 AM CDT -----  Contact: Crow Maria is calling regards to his dexcom is only lasting for 7 days and instead the 10 days and wants to speak with the office about it, Please call him at [phone].    Thanks  Td

## 2024-07-17 ENCOUNTER — DOCUMENT SCAN (OUTPATIENT)
Dept: HOME HEALTH SERVICES | Facility: HOSPITAL | Age: 69
End: 2024-07-17
Payer: MEDICARE

## 2024-07-17 DIAGNOSIS — M1A.09X0 CHRONIC GOUT OF MULTIPLE SITES, UNSPECIFIED CAUSE: ICD-10-CM

## 2024-07-18 ENCOUNTER — HOSPITAL ENCOUNTER (OUTPATIENT)
Dept: RADIOLOGY | Facility: HOSPITAL | Age: 69
Discharge: HOME | End: 2024-07-18
Attending: NURSE PRACTITIONER
Payer: MEDICARE

## 2024-07-18 ENCOUNTER — OFFICE VISIT (OUTPATIENT)
Dept: INTERNAL MEDICINE | Facility: CLINIC | Age: 69
End: 2024-07-18
Payer: MEDICARE

## 2024-07-18 VITALS
BODY MASS INDEX: 30.42 KG/M2 | OXYGEN SATURATION: 96 % | SYSTOLIC BLOOD PRESSURE: 98 MMHG | TEMPERATURE: 97 F | HEART RATE: 76 BPM | WEIGHT: 182.56 LBS | HEIGHT: 65 IN | DIASTOLIC BLOOD PRESSURE: 72 MMHG

## 2024-07-18 DIAGNOSIS — J45.40 MODERATE PERSISTENT ASTHMA WITHOUT COMPLICATION: ICD-10-CM

## 2024-07-18 DIAGNOSIS — J45.40 MODERATE PERSISTENT ASTHMA WITHOUT COMPLICATION: Primary | ICD-10-CM

## 2024-07-18 DIAGNOSIS — E11.22 TYPE 2 DIABETES MELLITUS WITH STAGE 3A CHRONIC KIDNEY DISEASE, WITH LONG-TERM CURRENT USE OF INSULIN: ICD-10-CM

## 2024-07-18 DIAGNOSIS — J45.998 UNCONTROLLED PERSISTENT ASTHMA: ICD-10-CM

## 2024-07-18 DIAGNOSIS — Z79.4 TYPE 2 DIABETES MELLITUS WITH STAGE 3A CHRONIC KIDNEY DISEASE, WITH LONG-TERM CURRENT USE OF INSULIN: ICD-10-CM

## 2024-07-18 DIAGNOSIS — R05.9 COUGH, UNSPECIFIED TYPE: ICD-10-CM

## 2024-07-18 DIAGNOSIS — N18.31 TYPE 2 DIABETES MELLITUS WITH STAGE 3A CHRONIC KIDNEY DISEASE, WITH LONG-TERM CURRENT USE OF INSULIN: ICD-10-CM

## 2024-07-18 PROCEDURE — 3078F DIAST BP <80 MM HG: CPT | Mod: CPTII,S$GLB,, | Performed by: NURSE PRACTITIONER

## 2024-07-18 PROCEDURE — 1125F AMNT PAIN NOTED PAIN PRSNT: CPT | Mod: CPTII,S$GLB,, | Performed by: NURSE PRACTITIONER

## 2024-07-18 PROCEDURE — 3008F BODY MASS INDEX DOCD: CPT | Mod: CPTII,S$GLB,, | Performed by: NURSE PRACTITIONER

## 2024-07-18 PROCEDURE — 3288F FALL RISK ASSESSMENT DOCD: CPT | Mod: CPTII,S$GLB,, | Performed by: NURSE PRACTITIONER

## 2024-07-18 PROCEDURE — 71046 X-RAY EXAM CHEST 2 VIEWS: CPT | Mod: 26,,, | Performed by: RADIOLOGY

## 2024-07-18 PROCEDURE — 99999 PR PBB SHADOW E&M-EST. PATIENT-LVL V: CPT | Mod: PBBFAC,,, | Performed by: NURSE PRACTITIONER

## 2024-07-18 PROCEDURE — 99214 OFFICE O/P EST MOD 30 MIN: CPT | Mod: S$GLB,,, | Performed by: NURSE PRACTITIONER

## 2024-07-18 PROCEDURE — 71046 X-RAY EXAM CHEST 2 VIEWS: CPT | Mod: TC

## 2024-07-18 PROCEDURE — 1159F MED LIST DOCD IN RCRD: CPT | Mod: CPTII,S$GLB,, | Performed by: NURSE PRACTITIONER

## 2024-07-18 PROCEDURE — 3074F SYST BP LT 130 MM HG: CPT | Mod: CPTII,S$GLB,, | Performed by: NURSE PRACTITIONER

## 2024-07-18 PROCEDURE — 3044F HG A1C LEVEL LT 7.0%: CPT | Mod: CPTII,S$GLB,, | Performed by: NURSE PRACTITIONER

## 2024-07-18 PROCEDURE — 1100F PTFALLS ASSESS-DOCD GE2>/YR: CPT | Mod: CPTII,S$GLB,, | Performed by: NURSE PRACTITIONER

## 2024-07-18 RX ORDER — METHYLPREDNISOLONE 4 MG/1
TABLET ORAL
Qty: 1 EACH | Refills: 0 | Status: SHIPPED | OUTPATIENT
Start: 2024-07-18 | End: 2024-08-08

## 2024-07-18 RX ORDER — AZITHROMYCIN 250 MG/1
TABLET, FILM COATED ORAL
Qty: 6 TABLET | Refills: 0 | Status: SHIPPED | OUTPATIENT
Start: 2024-07-18

## 2024-07-18 RX ORDER — FLUTICASONE FUROATE, UMECLIDINIUM BROMIDE AND VILANTEROL TRIFENATATE 200; 62.5; 25 UG/1; UG/1; UG/1
1 POWDER RESPIRATORY (INHALATION) DAILY
Qty: 360 EACH | Refills: 5 | Status: SHIPPED | OUTPATIENT
Start: 2024-07-18

## 2024-07-18 RX ORDER — PROMETHAZINE HYDROCHLORIDE AND DEXTROMETHORPHAN HYDROBROMIDE 6.25; 15 MG/5ML; MG/5ML
5 SYRUP ORAL NIGHTLY PRN
Qty: 180 ML | Refills: 0 | Status: SHIPPED | OUTPATIENT
Start: 2024-07-18 | End: 2024-08-23

## 2024-07-18 RX ORDER — ALBUTEROL SULFATE 90 UG/1
2 AEROSOL, METERED RESPIRATORY (INHALATION) EVERY 4 HOURS PRN
Qty: 18 G | Refills: 1 | Status: SHIPPED | OUTPATIENT
Start: 2024-07-18 | End: 2025-07-18

## 2024-07-18 NOTE — PROGRESS NOTES
Subjective:       Patient ID: Crow Green is a 69 y.o. male.    Chief Complaint: Cough (Productive cough for over two months)    HPI    Patient complaining of a unrelenting cough   He was seen in the emergency room recently for this  He does have COPD he reports wheezing and nonproductive cough no fever intermittent shortness of breath but at usual range  He denies leg swelling or unintentional weight gain and there is no chest pain    Past Medical History:   Diagnosis Date    Arthritis     Asthma     Atrial fibrillation     Atrial fibrillation with RVR 08/07/2019    Carotid artery stenosis     CHF (congestive heart failure)     Chronic obstructive pulmonary disease 08/05/2020    Depression     Dermatomyositis     Diabetes mellitus, type 2 Diagnosed in 2000    Elevated coronary artery calcium score 02/14/2024    Elevated PSA     Follow up 07/30/2020    Hypertension     Myocardial infarction     2017    Sleep apnea      Past Surgical History:   Procedure Laterality Date    ABLATION OF ARRHYTHMOGENIC FOCUS FOR ATRIAL FIBRILLATION N/A 2/27/2020    Procedure: Ablation atrial fibrillation;  Surgeon: Gio Brown MD;  Location: [facility] EP LAB;  Service: Cardiology;  Laterality: N/A;  afib, DAMIEN (cx if SR), PVI, PITO, anes, MB, 3 Prep    CHOLECYSTECTOMY      CLOSURE OF WOUND Left 7/1/2020    Procedure: CLOSURE, WOUND;  Surgeon: Barb Veras DPM;  Location: [facility] OR;  Service: Podiatry;  Laterality: Left;    COLONOSCOPY N/A 3/4/2022    Procedure: COLONOSCOPY;  Surgeon: Yolis Freitas MD;  Location: [facility] ENDO;  Service: Endoscopy;  Laterality: N/A;    ESOPHAGOGASTRODUODENOSCOPY N/A 3/4/2022    Procedure: EGD (ESOPHAGOGASTRODUODENOSCOPY);  Surgeon: Yolis Freitas MD;  Location: [facility] ENDO;  Service: Endoscopy;  Laterality: N/A;    INJECTION OF ANESTHETIC AGENT INTO SACROILIAC JOINT Left 3/24/2021    Procedure: Left BLOCK, SACROILIAC JOINT and bilateral rhomboid TPI;  Surgeon: Dillan Tsai MD;   Location: [facility] PAIN MGT;  Service: Pain Management;  Laterality: Left;    INTRALUMINAL GASTROINTESTINAL TRACT IMAGING VIA CAPSULE N/A 3/22/2022    Procedure: IMAGING PROCEDURE, GI TRACT, INTRALUMINAL, VIA CAPSULE;  Surgeon: Justice Martinez RN;  Location: [facility] ENDO;  Service: Endoscopy;  Laterality: N/A;    REVERSE TOTAL SHOULDER ARTHROPLASTY Left 1/10/2022    Procedure: ARTHROPLASTY, SHOULDER, TOTAL, REVERSE;  Surgeon: Anthony Alvarado MD;  Location: [facility] OR;  Service: Orthopedics;  Laterality: Left;  left reverse total shoulder arthroplasty     SELECTIVE INJECTION OF ANESTHETIC AGENT AROUND LUMBAR SPINAL NERVE ROOT BY TRANSFORAMINAL APPROACH Left 6/11/2020    Procedure: BLOCK, SPINAL NERVE ROOT, LUMBAR, SELECTIVE, TRANSFORAMINAL APPROACH Left L4-5, L5-S1 TFESI with RN IV sedation;  Surgeon: Dillan Tsai MD;  Location: [facility] PAIN MGT;  Service: Pain Management;  Laterality: Left;    TOE AMPUTATION Left 6/29/2020    Procedure: AMPUTATION, TOE;  Surgeon: Barb Veras DPM;  Location: [facility] OR;  Service: Podiatry;  Laterality: Left;  great toe     Social History     Socioeconomic History    Marital status: Legally     Number of children: 5   Occupational History    Occupation: disabled    Occupation: PT at [employer]    Tobacco Use    Smoking status: Never    Smokeless tobacco: Never   Substance and Sexual Activity    Alcohol use: Yes     Comment: rare, maybe holidays    Drug use: Not Currently     Types: Cocaine    Sexual activity: Not Currently     Partners: Female   Social History Narrative    Utilizing [employer] transportation which has been unreliable.      Social Determinants of Health     Financial Resource Strain: Low Risk  (7/25/2023)    [facility] from [facility], [facility]    Overall Financial Resource Strain (CARDIA)     Difficulty  of Paying Living Expenses: Not hard at all   Food Insecurity: No Food Insecurity (7/25/2023)    Received from [facility], [facility]    Hunger Vital Sign     Worried About Running Out of Food in the Last Year: Never true     Ran Out of Food in the Last Year: Never true   Transportation Needs: No Transportation Needs (7/25/2023)    [facility]    PRAPARE - Transportation     Lack of Transportation (Medical): No     Lack of Transportation (Non-Medical): No   Physical Activity: Inactive (12/7/2022)    Exercise Vital Sign     Days of Exercise per Week: 0 days     Minutes of Exercise per Session: 0 min   Stress: No Stress Concern Present (7/25/2023)    Received from [facility], [facility]    [facility] [place] of Occupational Health - Occupational Stress Questionnaire     Feeling of Stress : Not at all   Housing Stability: Unknown (7/25/2023)    Received from [facility], [facility]    Housing Stability Vital Sign     Unable to Pay for Housing in the Last Year: No     Review of patient's allergies indicates:   Allergen Reactions    Protamine Hives     Urticaria, possible upper airway swelling     Current Outpatient Medications   Medication Sig    albuterol (PROVENTIL) 2.5 mg /3 mL (0.083 %) nebulizer solution INHALE THE CONTENTS OF 1 VIAL VIA NEBULIZER EVERY 4 TO 6 HOURS AS NEEDED FOR WHEEZING OR FOR SHORTNESS  OF BREATH    allopurinoL (ZYLOPRIM) 100 MG tablet Take 1 tablet (100 mg total) by mouth once daily.    amitriptyline (ELAVIL) 25 MG tablet Take 1 tablet (25 mg total) by mouth every evening.    amLODIPine (NORVASC) 2.5 MG tablet Take 1 tablet (2.5 mg total) by mouth once daily.    atorvastatin (LIPITOR) 40 MG tablet Take 1 tablet (40 mg total) by mouth every evening.    blood sugar diagnostic (TRUE METRIX GLUCOSE TEST STRIP) Strp Inject 1 each into the skin 4 (four) times daily.    blood-glucose meter Misc Use as instructed to test blood glucose meter daily.    brinzolamide-brimonidine (SIMBRINZA) 1-0.2 % DrpS Place 1 drop into both eyes 3 (three) times daily.    cyanocobalamin (VITAMIN B-12) 1000 MCG tablet Take 1 tablet by mouth once daily.    DEXCOM G7 SENSOR Shefali Change sensor every 10 days    ELIQUIS 5 mg Tab TAKE 1 TABLET TWICE DAILY    empagliflozin (JARDIANCE) 25 mg tablet Take 1 tablet (25 mg total) by mouth once daily.    furosemide (LASIX) 40 MG tablet Take 2.5 tablets (100 mg total) by mouth 2 (two) times a day. (Patient taking differently: Take 120 mg by mouth 2 (two) times a day.)    gabapentin (NEURONTIN) 800 MG tablet Take 1 tablet (800 mg total) by mouth 3 (three) times daily.    insulin aspart U-100 (NOVOLOG FLEXPEN U-100 INSULIN) 100 unit/mL (3 mL) InPn pen INJECT 10 UNITS UNDER THE SKIN THREE TIMES DAILY WITH MEALS    INV allopurinol tablet Take 1 tablet by mouth once daily.    lancets (TRUEPLUS LANCETS) 33 gauge Misc Use to check blood sugar twice a day    lancets (TRUEPLUS LANCETS) 33 gauge Misc USE AS DIRECTED TO TEST BLOOD SUGAR FOUR TIMES DAILY    latanoprost 0.005 % ophthalmic solution INSTILL 1 DROP INTO BOTH EYES ONE TIME DAILY BOTTLE EXPIRES 42 DAYS AFTER OPENING...    lisinopriL (PRINIVIL,ZESTRIL) 30 MG tablet TAKE 1 TABLET EVERY DAY.    multivitamin-iron-folic acid (SENTRY) Tab Take 1 tablet by mouth once daily.    omalizumab (XOLAIR) 150 mg/mL injection Inject 2 mLs (300 mg total)  "into the skin every 14 (fourteen) days.    omalizumab (XOLAIR) 75 mg/0.5 mL injection Inject 0.5 mLs (75 mg total) into the skin every 14 (fourteen) days.    pantoprazole (PROTONIX) 40 MG tablet Take 1 tablet (40 mg total) by mouth once daily.    papaverine-phentolamin-alprost 150 mg-5 mg- 50 mcg SolR 0.5 mLs by Intracavernosal route as needed (ED).    pen needle, diabetic (BD ULTRA-FINE SHORT PEN NEEDLE) 31 gauge x 5/16" Ndle 1 each by Misc.(Non-Drug; Combo Route) route 3 (three) times daily.    pep injection Inject 0.15 ml as directed     For compounding pharmacy use:   Add PAPAVERINE 30 mg  Add PHENTOLAMINE 1 mg  Add ALPROSTADIL 20 mcg    ranolazine (RANEXA) 1,000 mg Tb12 TAKE 1 TABLET TWICE DAILY    semaglutide (OZEMPIC) 2 mg/dose (8 mg/3 mL) PnIj Inject 2 mg into the skin every 7 days.    sertraline (ZOLOFT) 100 MG tablet Take 1 tablet (100 mg total) by mouth every evening.    traZODone (DESYREL) 100 MG tablet TAKE 2 TABLETS EVERY EVENING    [employer] AEROSOL DELIVERY SYSTEM Shefali USE AS DIRESTED    albuterol (PROVENTIL/VENTOLIN HFA) 90 mcg/actuation inhaler Inhale 2 puffs into the lungs every 4 (four) hours as needed for Wheezing or Shortness of Breath.    azithromycin (Z-HOLDEN) 250 MG tablet Take 2 tablets by mouth on day 1; Take 1 tablet by mouth on days 2-5    EPINEPHrine (EPIPEN) 0.3 mg/0.3 mL AtIn Inject 0.3 mLs (0.3 mg total) into the muscle once. for 1 dose    fluticasone-umeclidin-vilanter (TRELEGY ELLIPTA) 200-62.5-25 mcg inhaler Inhale 1 puff into the lungs once daily.    methylPREDNISolone (MEDROL DOSEPACK) 4 mg tablet use as directed    promethazine-dextromethorphan (PROMETHAZINE-DM) 6.25-15 mg/5 mL Syrp Take 5 mLs by mouth nightly as needed (cough).     Current Facility-Administered Medications   Medication    omalizumab injection 300 mg    omalizumab injection 75 mg           Review of Systems   Constitutional:  Negative for activity change, appetite change, chills, diaphoresis, fatigue, fever and " unexpected weight change.   HENT:  Negative for congestion, ear pain, postnasal drip, rhinorrhea, sinus pressure, sinus pain, sneezing, sore throat, tinnitus, trouble swallowing and voice change.    Eyes:  Negative for photophobia, pain and visual disturbance.   Respiratory:  Positive for cough, shortness of breath and wheezing. Negative for chest tightness.    Cardiovascular:  Negative for chest pain, palpitations and leg swelling.   Gastrointestinal:  Negative for abdominal distention, abdominal pain, constipation, diarrhea, nausea and vomiting.   Genitourinary:  Negative for decreased urine volume, difficulty urinating, dysuria, flank pain, frequency, hematuria and urgency.   Musculoskeletal:  Negative for arthralgias, back pain, joint swelling, neck pain and neck stiffness.   Allergic/Immunologic: Negative for immunocompromised state.   Neurological:  Negative for dizziness, tremors, seizures, syncope, facial asymmetry, speech difficulty, weakness, light-headedness, numbness and headaches.   Hematological:  Negative for adenopathy. Does not bruise/bleed easily.   Psychiatric/Behavioral:  Negative for confusion and sleep disturbance.        Objective:      Physical Exam  Vitals reviewed.   Cardiovascular:      Rate and Rhythm: Normal rate and regular rhythm.      Pulses: Normal pulses.      Heart sounds: Normal heart sounds.   Pulmonary:      Effort: Pulmonary effort is normal.      Breath sounds: Wheezing present.   Musculoskeletal:         General: Normal range of motion.   Skin:     General: Skin is warm and dry.   Neurological:      Mental Status: He is alert and oriented to person, place, and time.         Assessment:     Vitals:    07/18/24 0902   BP: 98/72   Pulse: 76   Temp: 97.4 °F (36.3 °C)         1. Moderate persistent asthma without complication    2. Cough, unspecified type    3. Uncontrolled persistent asthma    4. Type 2 diabetes mellitus with stage 3a chronic kidney disease, with long-term  current use of insulin        Plan:   Moderate persistent asthma without complication  -     X-Ray Chest PA And Lateral; Future; Expected date: 07/18/2024    Cough, unspecified type    Uncontrolled persistent asthma  -     fluticasone-umeclidin-vilanter (TRELEGY ELLIPTA) 200-62.5-25 mcg inhaler; Inhale 1 puff into the lungs once daily.  Dispense: 360 each; Refill: 5    Type 2 diabetes mellitus with stage 3a chronic kidney disease, with long-term current use of insulin    Other orders  -     albuterol (PROVENTIL/VENTOLIN HFA) 90 mcg/actuation inhaler; Inhale 2 puffs into the lungs every 4 (four) hours as needed for Wheezing or Shortness of Breath.  Dispense: 18 g; Refill: 1  -     promethazine-dextromethorphan (PROMETHAZINE-DM) 6.25-15 mg/5 mL Syrp; Take 5 mLs by mouth nightly as needed (cough).  Dispense: 180 mL; Refill: 0  -     azithromycin (Z-HOLDEN) 250 MG tablet; Take 2 tablets by mouth on day 1; Take 1 tablet by mouth on days 2-5  Dispense: 6 tablet; Refill: 0  -     methylPREDNISolone (MEDROL DOSEPACK) 4 mg tablet; use as directed  Dispense: 1 each; Refill: 0      Check chest x-ray now   Patient is out of his inhalers refills Trelegy and albuterol  Cough syrup Z-Holden and steroid pack as prescribed   Monitor blood sugars   Keep appointment next month with allergy and pulmonology  ER precautions discussed

## 2024-07-19 ENCOUNTER — TELEPHONE (OUTPATIENT)
Dept: INTERNAL MEDICINE | Facility: CLINIC | Age: 69
End: 2024-07-19
Payer: MEDICARE

## 2024-07-19 NOTE — TELEPHONE ENCOUNTER
Pt is requesting a call back in ref to cough syrup that was sent to pharmacy was the wrong kind and charging pt / need the one with codeine sent in

## 2024-07-22 RX ORDER — RANOLAZINE 1000 MG/1
TABLET, EXTENDED RELEASE ORAL
Qty: 180 TABLET | Refills: 3 | Status: SHIPPED | OUTPATIENT
Start: 2024-07-22

## 2024-07-22 RX ORDER — LATANOPROST 50 UG/ML
1 SOLUTION/ DROPS OPHTHALMIC NIGHTLY
Qty: 2.5 ML | Refills: 0 | Status: SHIPPED | OUTPATIENT
Start: 2024-07-22 | End: 2025-07-22

## 2024-07-23 ENCOUNTER — TELEPHONE (OUTPATIENT)
Dept: INTERNAL MEDICINE | Facility: CLINIC | Age: 69
End: 2024-07-23
Payer: MEDICARE

## 2024-07-23 ENCOUNTER — TELEPHONE (OUTPATIENT)
Dept: CARDIOLOGY | Facility: CLINIC | Age: 69
End: 2024-07-23
Payer: MEDICARE

## 2024-07-23 RX ORDER — ALLOPURINOL 100 MG/1
100 TABLET ORAL
Qty: 90 TABLET | Refills: 3 | Status: SHIPPED | OUTPATIENT
Start: 2024-07-23

## 2024-07-23 NOTE — PROGRESS NOTES
Subjective:     Patient ID: Crow Green is a 69 y.o. male.    Chief Complaint: Nail Care (3 month nail care, pt rates pain 8/10, like needles and numbness, pt is diabetic, pt last seen pcp Lakeisha Pastor PA-C 4/30/24, pt wears tennis shoes and socks)      Crow is a 69 y.o. male who presents to the clinic for evaluation and treatment of high risk feet. Crow has a past medical history of Arthritis, Asthma, Atrial fibrillation, Atrial fibrillation with RVR (08/07/2019), Carotid artery stenosis, CHF (congestive heart failure), Chronic obstructive pulmonary disease (08/05/2020), Depression, Dermatomyositis, Diabetes mellitus, type 2 (Diagnosed in 2000), Elevated coronary artery calcium score (02/14/2024), Elevated PSA, Follow up (07/30/2020), Hypertension, Myocardial infarction, and Sleep apnea. The patient's chief complaint is long, thick toenails. This patient has documented high risk feet requiring routine maintenance secondary to diabetes mellitis and those secondary complications of diabetes, as mentioned.. Patient state she is running out of Gabapentin and his feet and hands are burning and tingling.     PCP: Cm Polanco MD    Date Last Seen by PCP: 04/30/2024    Current shoe gear:  Affected Foot: Rx diabetic extra depth shoes and custom accommodative insoles     Unaffected Foot: Rx diabetic extra depth shoes and custom accommodative insoles    Hemoglobin A1C   Date Value Ref Range Status   04/10/2024 5.7 (H) 4.0 - 5.6 % Final     Comment:     ADA Screening Guidelines:  5.7-6.4%  Consistent with prediabetes  >or=6.5%  Consistent with diabetes    High levels of fetal hemoglobin interfere with the HbA1C  assay. Heterozygous hemoglobin variants (HbS, HgC, etc)do  not significantly interfere with this assay.   However, presence of multiple variants may affect accuracy.     09/29/2023 6.4 (H) 4.0 - 5.6 % Final     Comment:     ADA Screening Guidelines:  5.7-6.4%  Consistent with  prediabetes  >or=6.5%  Consistent with diabetes    High levels of fetal hemoglobin interfere with the HbA1C  assay. Heterozygous hemoglobin variants (HbS, HgC, etc)do  not significantly interfere with this assay.   However, presence of multiple variants may affect accuracy.     05/23/2023 6.2 <=6.5 % Final   01/04/2023 6.3 (H) 4.0 - 5.6 % Final     Comment:     ADA Screening Guidelines:  5.7-6.4%  Consistent with prediabetes  >or=6.5%  Consistent with diabetes    High levels of fetal hemoglobin interfere with the HbA1C  assay. Heterozygous hemoglobin variants (HbS, HgC, etc)do  not significantly interfere with this assay.   However, presence of multiple variants may affect accuracy.         Patient Active Problem List   Diagnosis    ED (erectile dysfunction)    Non-proliferative diabetic retinopathy, mild, both eyes    Essential hypertension    Diabetic polyneuropathy    Nonobstructive coronary artery disease of native artery of native heart    Chronic combined systolic and diastolic heart failure    Type 2 diabetes mellitus with stage 3a chronic kidney disease, with long-term current use of insulin    SANDRITA (obstructive sleep apnea)    Allergic asthma    Psychophysiological insomnia    Nightmares    Sleep related gastroesophageal reflux disease    Inadequate sleep hygiene    Chest pain    PAF (paroxysmal atrial fibrillation)    At risk for medication noncompliance    Obesity (BMI 30.0-34.9)    Indeterminate stage chronic open angle glaucoma    Right hip pain    Gait instability    Chronic right shoulder pain    Arthritis    Left sided sciatica    Osteoarthritis of spine with radiculopathy, lumbar region    HNP (herniated nucleus pulposus), lumbar    Gastroesophageal reflux disease without esophagitis    Hypogonadism in male    Disorder of parathyroid gland    Hyperlipidemia associated with type 2 diabetes mellitus    Traumatic tear of left rotator cuff    Other chronic pain    Left shoulder pain    Complete tear of  left rotator cuff    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 associated with type 2 diabetes mellitus    Lumbar radiculopathy    Diabetic ulcer of toe of left foot associated with type 2 diabetes mellitus, with necrosis of muscle    Ulcer of left foot    Elevated troponin    Hypotension due to medication    Dermatomyositis    Postprocedural hypotension    Advanced directives, counseling/discussion    Schizoaffective disorder, depressive type    MDD (major depressive disorder), recurrent episode, moderate    DOMINIK (generalized anxiety disorder)    Bilateral carpal tunnel syndrome    Rotator cuff tear arthropathy of left shoulder    Sacroiliac dysfunction    Atherosclerosis of native coronary artery of native heart with unstable angina pectoris    Coronary vasospasm    Cocaine abuse    Osteopenia    Atherosclerosis of aorta    Cardiac asthma    Chronic gout of multiple sites    Acquired absence of left great toe    Hypoxia    Cardiomyopathy    Bilateral carotid artery stenosis    Substance abuse(UDS + for cocaine )    Immunocompromised patient    Other reduced mobility    Abnormality of gait and mobility    Dependence on supplemental oxygen    Positive depression screening    Pulmonary hypertension- echo 7/2022    NICM (nonischemic cardiomyopathy)    Elevated IgE level    Moderate persistent asthma without complication    Elevated coronary artery calcium score    Other chest pain       Medication List with Changes/Refills   New Medications    AZITHROMYCIN (Z-HOLDEN) 250 MG TABLET    Take 2 tablets by mouth on day 1; Take 1 tablet by mouth on days 2-5    METHYLPREDNISOLONE (MEDROL DOSEPACK) 4 MG TABLET    use as directed    PROMETHAZINE-DEXTROMETHORPHAN (PROMETHAZINE-DM) 6.25-15 MG/5 ML SYRP    Take 5 mLs by mouth nightly as needed (cough).   Current Medications    ALBUTEROL (PROVENTIL) 2.5 MG /3 ML (0.083 %) NEBULIZER SOLUTION    INHALE THE CONTENTS OF 1 VIAL VIA NEBULIZER EVERY 4 TO 6  HOURS AS NEEDED FOR WHEEZING OR FOR SHORTNESS OF BREATH    AMITRIPTYLINE (ELAVIL) 25 MG TABLET    Take 1 tablet (25 mg total) by mouth every evening.    AMLODIPINE (NORVASC) 2.5 MG TABLET    Take 1 tablet (2.5 mg total) by mouth once daily.    ATORVASTATIN (LIPITOR) 40 MG TABLET    Take 1 tablet (40 mg total) by mouth every evening.    BLOOD SUGAR DIAGNOSTIC (TRUE METRIX GLUCOSE TEST STRIP) STRP    Inject 1 each into the skin 4 (four) times daily.    BLOOD-GLUCOSE METER MISC    Use as instructed to test blood glucose meter daily.    BRINZOLAMIDE-BRIMONIDINE (SIMBRINZA) 1-0.2 % DRPS    Place 1 drop into both eyes 3 (three) times daily.    CYANOCOBALAMIN (VITAMIN B-12) 1000 MCG TABLET    Take 1 tablet by mouth once daily.    DEXCOM G7 SENSOR VIN    Change sensor every 10 days    ELIQUIS 5 MG TAB    TAKE 1 TABLET TWICE DAILY    EMPAGLIFLOZIN (JARDIANCE) 25 MG TABLET    Take 1 tablet (25 mg total) by mouth once daily.    EPINEPHRINE (EPIPEN) 0.3 MG/0.3 ML ATIN    Inject 0.3 mLs (0.3 mg total) into the muscle once. for 1 dose    FUROSEMIDE (LASIX) 40 MG TABLET    Take 2.5 tablets (100 mg total) by mouth 2 (two) times a day.    GABAPENTIN (NEURONTIN) 800 MG TABLET    Take 1 tablet (800 mg total) by mouth 3 (three) times daily.    INSULIN ASPART U-100 (NOVOLOG FLEXPEN U-100 INSULIN) 100 UNIT/ML (3 ML) INPN PEN    INJECT 10 UNITS UNDER THE SKIN THREE TIMES DAILY WITH MEALS    INV ALLOPURINOL TABLET    Take 1 tablet by mouth once daily.    LANCETS (TRUEPLUS LANCETS) 33 GAUGE MISC    Use to check blood sugar twice a day    LANCETS (TRUEPLUS LANCETS) 33 GAUGE MISC    USE AS DIRECTED TO TEST BLOOD SUGAR FOUR TIMES DAILY    LISINOPRIL (PRINIVIL,ZESTRIL) 30 MG TABLET    TAKE 1 TABLET EVERY DAY.    MULTIVITAMIN-IRON-FOLIC ACID (SENTRY) TAB    Take 1 tablet by mouth once daily.    OMALIZUMAB (XOLAIR) 150 MG/ML INJECTION    Inject 2 mLs (300 mg total) into the skin every 14 (fourteen) days.    OMALIZUMAB (XOLAIR) 75 MG/0.5 ML  "INJECTION    Inject 0.5 mLs (75 mg total) into the skin every 14 (fourteen) days.    PANTOPRAZOLE (PROTONIX) 40 MG TABLET    Take 1 tablet (40 mg total) by mouth once daily.    PAPAVERINE-PHENTOLAMIN-ALPROST 150 MG-5 MG- 50 MCG SOLR    0.5 mLs by Intracavernosal route as needed (ED).    PEN NEEDLE, DIABETIC (BD ULTRA-FINE SHORT PEN NEEDLE) 31 GAUGE X 5/16" NDLE    1 each by Misc.(Non-Drug; Combo Route) route 3 (three) times daily.    PEP INJECTION    Inject 0.15 ml as directed     For compounding pharmacy use:   Add PAPAVERINE 30 mg  Add PHENTOLAMINE 1 mg  Add ALPROSTADIL 20 mcg    SEMAGLUTIDE (OZEMPIC) 2 MG/DOSE (8 MG/3 ML) PNIJ    Inject 2 mg into the skin every 7 days.    SERTRALINE (ZOLOFT) 100 MG TABLET    Take 1 tablet (100 mg total) by mouth every evening.    TRAZODONE (DESYREL) 100 MG TABLET    TAKE 2 TABLETS EVERY EVENING    [employer] AEROSOL DELIVERY SYSTEM VIN    USE AS DIRESTED   Changed and/or Refilled Medications    Modified Medication Previous Medication    ALBUTEROL (PROVENTIL/VENTOLIN HFA) 90 MCG/ACTUATION INHALER albuterol 90 mcg/actuation inhaler       Inhale 2 puffs into the lungs every 4 (four) hours as needed for Wheezing or Shortness of Breath.    Inhale 2 puffs into the lungs every 4 (four) hours as needed for Wheezing or Shortness of Breath.    ALLOPURINOL (ZYLOPRIM) 100 MG TABLET allopurinoL (ZYLOPRIM) 100 MG tablet       TAKE 1 TABLET ONE TIME DAILY    Take 1 tablet (100 mg total) by mouth once daily.    FLUTICASONE-UMECLIDIN-VILANTER (TRELEGY ELLIPTA) 200-62.5-25 MCG INHALER fluticasone-umeclidin-vilanter (TRELEGY ELLIPTA) 200-62.5-25 mcg inhaler       Inhale 1 puff into the lungs once daily.    Inhale 1 puff into the lungs once daily.    LATANOPROST 0.005 % OPHTHALMIC SOLUTION latanoprost 0.005 % ophthalmic solution       Place 1 drop into both eyes every evening.    INSTILL 1 DROP INTO BOTH EYES ONE TIME DAILY BOTTLE EXPIRES 42 DAYS AFTER OPENING...    RANOLAZINE (RANEXA) 1,000 MG TB12 " ranolazine (RANEXA) 1,000 mg Tb12       TAKE 1 TABLET TWICE DAILY    TAKE 1 TABLET TWICE DAILY       Review of patient's allergies indicates:   Allergen Reactions    Protamine Hives     Urticaria, possible upper airway swelling       Past Surgical History:   Procedure Laterality Date    ABLATION OF ARRHYTHMOGENIC FOCUS FOR ATRIAL FIBRILLATION N/A 2/27/2020    Procedure: Ablation atrial fibrillation;  Surgeon: Gio Brown MD;  Location: [facility] EP LAB;  Service: Cardiology;  Laterality: N/A;  afib, DAMIEN (cx if SR), PVI, PITO, anes, MB, 3 Prep    CHOLECYSTECTOMY      CLOSURE OF WOUND Left 7/1/2020    Procedure: CLOSURE, WOUND;  Surgeon: Barb Veras DPM;  Location: [facility] OR;  Service: Podiatry;  Laterality: Left;    COLONOSCOPY N/A 3/4/2022    Procedure: COLONOSCOPY;  Surgeon: Yolis Freitas MD;  Location: [facility] ENDO;  Service: Endoscopy;  Laterality: N/A;    ESOPHAGOGASTRODUODENOSCOPY N/A 3/4/2022    Procedure: EGD (ESOPHAGOGASTRODUODENOSCOPY);  Surgeon: Yolis Freitas MD;  Location: [facility] ENDO;  Service: Endoscopy;  Laterality: N/A;    INJECTION OF ANESTHETIC AGENT INTO SACROILIAC JOINT Left 3/24/2021    Procedure: Left BLOCK, SACROILIAC JOINT and bilateral rhomboid TPI;  Surgeon: Dillan Tsai MD;  Location: [facility] PAIN MGT;  Service: Pain Management;  Laterality: Left;    INTRALUMINAL GASTROINTESTINAL TRACT IMAGING VIA CAPSULE N/A 3/22/2022    Procedure: IMAGING PROCEDURE, GI TRACT, INTRALUMINAL, VIA CAPSULE;  Surgeon: Justice Martinez RN;  Location: [facility] ENDO;  Service: Endoscopy;  Laterality: N/A;    REVERSE TOTAL SHOULDER ARTHROPLASTY Left 1/10/2022    Procedure: ARTHROPLASTY, SHOULDER, TOTAL, REVERSE;  Surgeon: Anthony Alvarado MD;  Location: [facility] OR;  Service: Orthopedics;  Laterality: Left;  left reverse total shoulder arthroplasty     SELECTIVE INJECTION OF ANESTHETIC AGENT AROUND LUMBAR SPINAL NERVE ROOT BY TRANSFORAMINAL APPROACH Left 6/11/2020    Procedure: BLOCK, SPINAL NERVE ROOT,  LUMBAR, SELECTIVE, TRANSFORAMINAL APPROACH Left L4-5, L5-S1 TFESI with RN IV sedation;  Surgeon: Dillan Tsai MD;  [facility];  Service: Pain Management;  Laterality: Left;    TOE AMPUTATION Left 6/29/2020    Procedure: AMPUTATION, TOE;  Surgeon: Barb Veras DPM;  Location: [facility] OR;  Service: Podiatry;  Laterality: Left;  great toe       Family History   Problem Relation Name Age of Onset    Hypertension Mother      Glaucoma Mother      Cataracts Mother      Diabetes Mother      Cataracts Father      Stroke Father      Glaucoma Sister 4         x3    Cataracts Sister 4         x3    Diabetes Sister 4         x1    Stroke Sister 4         x1    No Known Problems Brother 2     No Known Problems Daughter 2     No Known Problems Son 3     Glaucoma Maternal Aunt      Diabetes Maternal Aunt      Cancer Maternal Grandfather          lung (smoker)    Prostate cancer Neg Hx      Heart disease Neg Hx      Kidney disease Neg Hx         Social History     Socioeconomic History    Marital status: Legally     Number of children: 5   Occupational History    Occupation: disabled    Occupation: PT at [employer]    Tobacco Use    Smoking status: Never    Smokeless tobacco: Never   Substance and Sexual Activity    Alcohol use: Yes     Comment: rare, maybe holidays    Drug use: Not Currently     Types: Cocaine    Sexual activity: Not Currently     Partners: Female   Social History Narrative    Utilizing [employer] transportation which has been unreliable.      Social Determinants of Health     Financial Resource Strain: Low Risk  (7/25/2023)    Received from [facility], [employer] [facility]    Overall Financial Resource Strain (CARDIA)     Difficulty of Paying Living Expenses: Not hard at all   Food Insecurity: No Food Insecurity (7/25/2023)    [facility]  [facility], [facility]    Hunger Vital Sign     Worried About Running Out of Food in the Last Year: Never true     Ran Out of Food in the Last Year: Never true   Transportation Needs: No Transportation Needs (7/25/2023)    [facility], [facility]    PRAPARE - Transportation     Lack of Transportation (Medical): No     Lack of Transportation (Non-Medical): No   Physical Activity: Inactive (12/7/2022)    Exercise Vital Sign     Days of Exercise per Week: 0 days     Minutes of Exercise per Session: 0 min   Stress: No Stress Concern Present (7/25/2023)    [facility], [facility]    [place] [employer] - Occupational Stress Questionnaire     Feeling of Stress : Not at all   Housing Stability: Unknown (7/25/2023)    [facility], [facility]    Housing Stability Vital Sign     Unable to Pay for Housing in the Last Year: No       Vitals:    07/10/24 0857   Weight: 87 kg (191 lb 12.8 oz)   Height: 5' 5" (1.651 m)   PainSc:   8       Hemoglobin A1C   Date Value Ref Range Status   04/10/2024 5.7 (H) 4.0 - 5.6 % Final     Comment:     ADA Screening Guidelines:  5.7-6.4%  Consistent with prediabetes  >or=6.5%  Consistent with diabetes    High levels of fetal hemoglobin interfere with the HbA1C  assay. Heterozygous hemoglobin variants (HbS, HgC, etc)do  not significantly interfere " with this assay.   However, presence of multiple variants may affect accuracy.     09/29/2023 6.4 (H) 4.0 - 5.6 % Final     Comment:     ADA Screening Guidelines:  5.7-6.4%  Consistent with prediabetes  >or=6.5%  Consistent with diabetes    High levels of fetal hemoglobin interfere with the HbA1C  assay. Heterozygous hemoglobin variants (HbS, HgC, etc)do  not significantly interfere with this assay.   However, presence of multiple variants may affect accuracy.     05/23/2023 6.2 <=6.5 % Final   01/04/2023 6.3 (H) 4.0 - 5.6 % Final     Comment:     ADA Screening Guidelines:  5.7-6.4%  Consistent with prediabetes  >or=6.5%  Consistent with diabetes    High levels of fetal hemoglobin interfere with the HbA1C  assay. Heterozygous hemoglobin variants (HbS, HgC, etc)do  not significantly interfere with this assay.   However, presence of multiple variants may affect accuracy.         Review of Systems   Constitutional:  Negative for chills and fever.   Respiratory:  Negative for shortness of breath.    Cardiovascular:  Negative for chest pain, palpitations, orthopnea, claudication and leg swelling.   Gastrointestinal:  Negative for diarrhea, nausea and vomiting.   Musculoskeletal:  Negative for joint pain.   Skin:  Negative for rash.   Neurological:  Positive for tingling and sensory change.   Psychiatric/Behavioral: Negative.               Objective:      PHYSICAL EXAM: Apperance: Alert and orient in no distress,well developed, and with good attention to grooming and body habits  Patient presents ambulating in diabetic shoes and inserts.   LOWER EXTREMITY EXAM:  VASCULAR: Dorsalis pedis pulses 2/4 bilateral and Posterior Tibial pulses 2/4 bilateral. Capillary fill time <4 seconds bilateral. No edema observed bilateral. Varicosities absent bilateral. Skin temperature of the lower extremities is warm to warm, proximal to distal. Hair growth dim bilateral.  DERMATOLOGICAL: No skin rashes, subcutaneous nodules, lesions, or  ulcers observed bilateral. Nails 1,2,3,4,5 right and 2,3,4,5 left elongated, thickened, and discolored with subungual debris. Webspaces 1,2,3,4 bilateral clean, dry and without evidence of break in skin integrity. Mild hyperkeratotic tissue noted to left medial 1st MPJ and distal hallux amputation stump.   NEUROLOGICAL: Light touch, sharp-dull, proprioception all present and equal bilaterally.  Vibratory sensation diminished at bilateral hallux. Protective sensation absent sites as tested with a [place] 5.07 monofilament.   MUSCULOSKELETAL: Muscle strength is 5/5 for foot inverters, everters, plantarflexors, and dorsiflexors. Muscle tone is normal. Left hallux amputation noted.           Assessment:       ICD-10-CM ICD-9-CM   1. Type II diabetes mellitus with neurological manifestations  E11.49 250.60   2. Pre-ulcerative calluses - Left Foot  L84 700   3. Dermatophytosis of nail  B35.1 110.1   4. Amputated great toe, left  S98.112A 895.0           Plan:   Type II diabetes mellitus with neurological manifestations  -     DIABETIC SHOES FOR HOME USE    Pre-ulcerative calluses - Left Foot  -     DIABETIC SHOES FOR HOME USE    Dermatophytosis of nail    Amputated great toe, left  -     DIABETIC SHOES FOR HOME USE    I counseled the patient on his conditions, regarding findings of my examination, my impressions, and usual treatment plan.   Appointment spent on education about the diabetic foot, neuropathy, and prevention of limb loss.  Shoe inspection. Diabetic Foot Education. Patient reminded of the importance of good nutrition and blood sugar control to help prevent podiatric complications of diabetes. Patient instructed on proper foot hygeine. We discussed wearing proper shoe gear, daily foot inspections, never walking without protective shoe gear, never putting sharp instruments to feet.    With patient's permission, nails 1-5 bilateral were debrided/trimmed in length and thickness aggressively to their soft  tissue attachment mechanically and with electric , removing all offending nail and debris. Patient relates relief following the procedure.  With patient's permission, left foot pre-ulcerative callus trimmed in thickness with #15 blade in thickness without incident.   Prescription written for Diabetic shoes and inserts.   Patient  will continue to monitor the areas daily, inspect feet, wear protective shoe gear when ambulatory, moisturizer to maintain skin integrity. Patient reminded of the importance of good nutrition and blood sugar control to help prevent podiatric complications of diabetes.  Patient to return 4 months or sooner if needed.            Barb Veras DPM  [facility]

## 2024-07-23 NOTE — TELEPHONE ENCOUNTER
Patient stated the wrong cough syrup promethazine-dextromethorphan (PROMETHAZINE-DM) 6.25-15 mg/5 mL Syrpwas ordered.

## 2024-07-23 NOTE — TELEPHONE ENCOUNTER
ER Physician from [facility] contacted the office in regards to pt Completing CTA at their Facility. Physician just wanted to keep Dr. Zhao within the loop of patient care and to ensure that pt does not repeat test again due to active order for CTA still present.

## 2024-07-24 DIAGNOSIS — Z79.4 TYPE 2 DIABETES MELLITUS WITH MILD NONPROLIFERATIVE RETINOPATHY, WITH LONG-TERM CURRENT USE OF INSULIN, MACULAR EDEMA PRESENCE UNSPECIFIED, UNSPECIFIED LATERALITY: ICD-10-CM

## 2024-07-24 DIAGNOSIS — E11.3299 TYPE 2 DIABETES MELLITUS WITH MILD NONPROLIFERATIVE RETINOPATHY, WITH LONG-TERM CURRENT USE OF INSULIN, MACULAR EDEMA PRESENCE UNSPECIFIED, UNSPECIFIED LATERALITY: ICD-10-CM

## 2024-07-24 RX ORDER — INSULIN ASPART 100 [IU]/ML
INJECTION, SOLUTION INTRAVENOUS; SUBCUTANEOUS
Refills: 0 | OUTPATIENT
Start: 2024-07-24

## 2024-07-24 NOTE — TELEPHONE ENCOUNTER
No care due was identified.  [facility] Embedded Care Due Messages. Reference number: [id number].   7/24/2024 11:06:37 AM CDT

## 2024-07-24 NOTE — TELEPHONE ENCOUNTER
Disp Refills Start End MEHUL   insulin aspart U-100 (NOVOLOG FLEXPEN U-100 INSULIN) 100 unit/mL (3 mL) InPn pen 15 mL 10 5/23/2024 -- No   Sig: INJECT 10 UNITS UNDER THE SKIN THREE TIMES DAILY WITH MEALS   Sent to pharmacy as: insulin aspart U-100 (NOVOLOG FLEXPEN U-100 INSULIN) 100 unit/mL (3 mL) InPn pen   Class: Normal   Order: [id number]   Date/Time Signed: 5/23/2024 15:56       E-Prescribing Status: Receipt confirmed by pharmacy (5/23/2024  5:01 PM CDT)   Prior authorization: Approved     Pharmacy    [id number] - [place], [place] - [address]
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
## 2024-07-29 ENCOUNTER — OFFICE VISIT (OUTPATIENT)
Dept: PAIN MEDICINE | Facility: CLINIC | Age: 69
End: 2024-07-29
Payer: MEDICARE

## 2024-07-29 VITALS
WEIGHT: 189.63 LBS | SYSTOLIC BLOOD PRESSURE: 122 MMHG | DIASTOLIC BLOOD PRESSURE: 72 MMHG | HEIGHT: 65 IN | RESPIRATION RATE: 17 BRPM | BODY MASS INDEX: 31.59 KG/M2

## 2024-07-29 DIAGNOSIS — G89.29 CHRONIC PAIN OF BOTH SHOULDERS: ICD-10-CM

## 2024-07-29 DIAGNOSIS — M25.512 CHRONIC PAIN OF BOTH SHOULDERS: ICD-10-CM

## 2024-07-29 DIAGNOSIS — Z96.612 HISTORY OF TOTAL REPLACEMENT OF LEFT SHOULDER JOINT: Primary | ICD-10-CM

## 2024-07-29 DIAGNOSIS — M19.011 ARTHRITIS OF RIGHT GLENOHUMERAL JOINT: ICD-10-CM

## 2024-07-29 DIAGNOSIS — M25.511 CHRONIC PAIN OF BOTH SHOULDERS: ICD-10-CM

## 2024-07-29 PROCEDURE — 99204 OFFICE O/P NEW MOD 45 MIN: CPT | Mod: S$GLB,,, | Performed by: ANESTHESIOLOGY

## 2024-07-29 PROCEDURE — 4010F ACE/ARB THERAPY RXD/TAKEN: CPT | Mod: CPTII,S$GLB,, | Performed by: ANESTHESIOLOGY

## 2024-07-29 PROCEDURE — 99999 PR PBB SHADOW E&M-EST. PATIENT-LVL III: CPT | Mod: PBBFAC,,, | Performed by: ANESTHESIOLOGY

## 2024-07-29 PROCEDURE — 1160F RVW MEDS BY RX/DR IN RCRD: CPT | Mod: CPTII,S$GLB,, | Performed by: ANESTHESIOLOGY

## 2024-07-29 PROCEDURE — 3008F BODY MASS INDEX DOCD: CPT | Mod: CPTII,S$GLB,, | Performed by: ANESTHESIOLOGY

## 2024-07-29 PROCEDURE — 1125F AMNT PAIN NOTED PAIN PRSNT: CPT | Mod: CPTII,S$GLB,, | Performed by: ANESTHESIOLOGY

## 2024-07-29 PROCEDURE — 3078F DIAST BP <80 MM HG: CPT | Mod: CPTII,S$GLB,, | Performed by: ANESTHESIOLOGY

## 2024-07-29 PROCEDURE — 3044F HG A1C LEVEL LT 7.0%: CPT | Mod: CPTII,S$GLB,, | Performed by: ANESTHESIOLOGY

## 2024-07-29 PROCEDURE — 1159F MED LIST DOCD IN RCRD: CPT | Mod: CPTII,S$GLB,, | Performed by: ANESTHESIOLOGY

## 2024-07-29 PROCEDURE — 3074F SYST BP LT 130 MM HG: CPT | Mod: CPTII,S$GLB,, | Performed by: ANESTHESIOLOGY

## 2024-07-29 RX ORDER — PREGABALIN 100 MG/1
100 CAPSULE ORAL 3 TIMES DAILY
Qty: 90 CAPSULE | Refills: 0 | Status: SHIPPED | OUTPATIENT
Start: 2024-07-29 | End: 2024-08-28

## 2024-07-29 RX ORDER — METHOCARBAMOL 500 MG/1
500 TABLET, FILM COATED ORAL 2 TIMES DAILY PRN
COMMUNITY

## 2024-08-01 DIAGNOSIS — M51.26 HNP (HERNIATED NUCLEUS PULPOSUS), LUMBAR: ICD-10-CM

## 2024-08-01 DIAGNOSIS — E11.49 TYPE II DIABETES MELLITUS WITH NEUROLOGICAL MANIFESTATIONS: ICD-10-CM

## 2024-08-01 NOTE — PRE-PROCEDURE INSTRUCTIONS
Patient stated he would have medical transportation bring him to procedure but would be alone and does not have anyone to be with him bc he has three other Dr. Dais that day. RN offered to reschedule procedure and patient became angry and said he would not be having the procedure and hung up the phone.

## 2024-08-01 NOTE — TELEPHONE ENCOUNTER
Spoke with the patient stated that he is in pain. Shoulder, thigh, leg. The patient was informed to visit an [facility] for evaluation or the ER. The patient stated understanding.

## 2024-08-01 NOTE — TELEPHONE ENCOUNTER
No care due was identified.  [facility] Embedded Care Due Messages. Reference number: [id number].   8/01/2024 9:01:20 AM CDT

## 2024-08-01 NOTE — TELEPHONE ENCOUNTER
Refill Routing Note   Medication(s) are not appropriate for processing by [facility] for the following reason(s):        Outside of protocol    [facility] action(s):  Route               Appointments  past 12m or future 3m with PCP    Date Provider   Last Visit   10/30/2023 Cm Polanco MD   Next Visit   11/12/2024 Cm Polanco MD   ED visits in past 90 days: 1        Note composed:10:00 AM 08/01/2024

## 2024-08-01 NOTE — TELEPHONE ENCOUNTER
----- Message from Frances Frazier sent at 8/1/2024  4:54 PM CDT -----  .Type:  Needs Medical Advice    Who Called: pt    Would the patient rather a call back or a response via [facility]? Call back  Best Call Back Number: [phone]  Additional Information:     Pt stated he would like a call back because he is in pain

## 2024-08-02 RX ORDER — GABAPENTIN 800 MG/1
800 TABLET ORAL 3 TIMES DAILY
Qty: 270 TABLET | Refills: 4 | Status: SHIPPED | OUTPATIENT
Start: 2024-08-02

## 2024-08-08 NOTE — PROGRESS NOTES
Orthopaedic Follow-Up Visit    Last Appointment: 2/14/24  Diagnosis: Right shoulder advanced glenohumeral osteoarthritis, rotator cuff tear arthropathy   Prior Procedure: None    Crow Green is a 69 y.o. male who is here for evaluation of his right shoulder. The patient was last seen here by me on 2/14/24 at which point he reported bilateral shoulder pain that had persisted since a fall on 1/16/24 after slipping on ice. He was seen in the ER of 1/18/24 and XR revealed nothing acute. Followed-up with PCP on 1/29/24 and noted no improvement with norco or lidocaine patches at home. No improvement with tylenol. Was referred to orthopedics for further evaluation.  I have also treated him for his right shoulder rotator cuff tear arthropathy.  He has tried rest, activity modification, anti-inflammatories, corticosteroid injection, and physical therapy. We discussed the possibility to move forward with reverse total shoulder arthroplasty but surgery was ultimately not preformed. Patient returns today reporting continued bilateral shoulder pain as well as chronic back pain. He presents today with use of walker to assist with ambulation.     To review his history, Crow Green is a 69 y.o. male who was previously seen s/p left reverse TSA (DOS: 1/10/22). He has known right shoulder glenohumeral arthritis as well.     Patient's medications, allergies, past medical, surgical, social and family histories were reviewed and updated as appropriate.    Review of Systems   All systems reviewed were negative.  Specifically, the patient denies fever, chills, weight loss, chest pain, shortness of breath, or dyspnea on exertion.      Past Medical History:   Diagnosis Date    Arthritis     Asthma     Atrial fibrillation     Atrial fibrillation with RVR 08/07/2019    Carotid artery stenosis     CHF (congestive heart failure)     Chronic obstructive pulmonary disease 08/05/2020    Depression     Dermatomyositis     Diabetes  mellitus, type 2 Diagnosed in 2000    Elevated coronary artery calcium score 02/14/2024    Elevated PSA     Follow up 07/30/2020    Hypertension     Myocardial infarction     2017    Sleep apnea        Past Surgical History:   Procedure Laterality Date    ABLATION OF ARRHYTHMOGENIC FOCUS FOR ATRIAL FIBRILLATION N/A 2/27/2020    Procedure: Ablation atrial fibrillation;  Surgeon: Gio Brown MD;  Location: [facility] EP LAB;  Service: Cardiology;  Laterality: N/A;  afib, DAMIEN (cx if SR), PVI, PITO, anes, MB, 3 Prep    CHOLECYSTECTOMY      CLOSURE OF WOUND Left 7/1/2020    Procedure: CLOSURE, WOUND;  Surgeon: Barb Veras DPM;  Location: [facility] OR;  Service: Podiatry;  Laterality: Left;    COLONOSCOPY N/A 3/4/2022    Procedure: COLONOSCOPY;  Surgeon: Yolis Freitas MD;  Location: [facility] ENDO;  Service: Endoscopy;  Laterality: N/A;    ESOPHAGOGASTRODUODENOSCOPY N/A 3/4/2022    Procedure: EGD (ESOPHAGOGASTRODUODENOSCOPY);  Surgeon: Yolis Freitas MD;  Location: [facility];  Service: Endoscopy;  Laterality: N/A;    INJECTION OF ANESTHETIC AGENT INTO SACROILIAC JOINT Left 3/24/2021    Procedure: Left BLOCK, SACROILIAC JOINT and bilateral rhomboid TPI;  Surgeon: Dillan Tsai MD;  Location: [facility] MGT;  Service: Pain Management;  Laterality: Left;    INTRALUMINAL GASTROINTESTINAL TRACT IMAGING VIA CAPSULE N/A 3/22/2022    Procedure: IMAGING PROCEDURE, GI TRACT, INTRALUMINAL, VIA CAPSULE;  Surgeon: Justice Martinez RN;  Location: [facility] ENDO;  Service: Endoscopy;  Laterality: N/A;    REVERSE TOTAL SHOULDER ARTHROPLASTY Left 1/10/2022    Procedure: ARTHROPLASTY, SHOULDER, TOTAL, REVERSE;  Surgeon: Anthony Alvarado MD;  Location: [facility] OR;  Service: Orthopedics;  Laterality: Left;  left reverse total shoulder arthroplasty     SELECTIVE INJECTION OF ANESTHETIC AGENT AROUND LUMBAR SPINAL NERVE ROOT BY TRANSFORAMINAL APPROACH Left 6/11/2020    Procedure: BLOCK, SPINAL NERVE ROOT, LUMBAR, SELECTIVE, TRANSFORAMINAL  APPROACH Left L4-5, L5-S1 TFESI with RN IV sedation;  Surgeon: Dillan Tsai MD;  [facility];  Service: Pain Management;  Laterality: Left;    TOE AMPUTATION Left 6/29/2020    Procedure: AMPUTATION, TOE;  Surgeon: Barb Veras DPM;  Location: [facility] OR;  Service: Podiatry;  Laterality: Left;  great toe       Patient's Medications   New Prescriptions    No medications on file   Previous Medications    ALBUTEROL (PROVENTIL) 2.5 MG /3 ML (0.083 %) NEBULIZER SOLUTION    INHALE THE CONTENTS OF 1 VIAL VIA NEBULIZER EVERY 4 TO 6 HOURS AS NEEDED FOR WHEEZING OR FOR SHORTNESS OF BREATH    ALBUTEROL (PROVENTIL/VENTOLIN HFA) 90 MCG/ACTUATION INHALER    Inhale 2 puffs into the lungs every 4 (four) hours as needed for Wheezing or Shortness of Breath.    ALLOPURINOL (ZYLOPRIM) 100 MG TABLET    TAKE 1 TABLET ONE TIME DAILY    AMITRIPTYLINE (ELAVIL) 25 MG TABLET    Take 1 tablet (25 mg total) by mouth every evening.    AMLODIPINE (NORVASC) 2.5 MG TABLET    Take 1 tablet (2.5 mg total) by mouth once daily.    ATORVASTATIN (LIPITOR) 40 MG TABLET    Take 1 tablet (40 mg total) by mouth every evening.    AZITHROMYCIN (Z-HODLEN) 250 MG TABLET    Take 2 tablets by mouth on day 1; Take 1 tablet by mouth on days 2-5    BLOOD SUGAR DIAGNOSTIC (TRUE METRIX GLUCOSE TEST STRIP) STRP    Inject 1 each into the skin 4 (four) times daily.    BLOOD-GLUCOSE METER MISC    Use as instructed to test blood glucose meter daily.    BRINZOLAMIDE-BRIMONIDINE (SIMBRINZA) 1-0.2 % DRPS    Place 1 drop into both eyes 3 (three) times daily.    CYANOCOBALAMIN (VITAMIN B-12) 1000 MCG TABLET    Take 1 tablet by mouth once daily.    DEXCOM G7 SENSOR VIN    Change sensor every 10 days    ELIQUIS 5 MG TAB    TAKE 1 TABLET TWICE DAILY    EMPAGLIFLOZIN (JARDIANCE) 25 MG TABLET    Take 1 tablet (25 mg total) by mouth once daily.    EPINEPHRINE (EPIPEN) 0.3 MG/0.3 ML ATIN    Inject 0.3 mLs (0.3 mg total) into the muscle once. for 1 dose     "FLUTICASONE-UMECLIDIN-VILANTER (TRELEGY ELLIPTA) 200-62.5-25 MCG INHALER    Inhale 1 puff into the lungs once daily.    FUROSEMIDE (LASIX) 40 MG TABLET    Take 2.5 tablets (100 mg total) by mouth 2 (two) times a day.    GABAPENTIN (NEURONTIN) 800 MG TABLET    Take 1 tablet (800 mg total) by mouth 3 (three) times daily.    INSULIN ASPART U-100 (NOVOLOG FLEXPEN U-100 INSULIN) 100 UNIT/ML (3 ML) INPN PEN    INJECT 10 UNITS UNDER THE SKIN THREE TIMES DAILY WITH MEALS    INV ALLOPURINOL TABLET    Take 1 tablet by mouth once daily.    LANCETS (TRUEPLUS LANCETS) 33 GAUGE MISC    Use to check blood sugar twice a day    LANCETS (TRUEPLUS LANCETS) 33 GAUGE MISC    USE AS DIRECTED TO TEST BLOOD SUGAR FOUR TIMES DAILY    LATANOPROST 0.005 % OPHTHALMIC SOLUTION    Place 1 drop into both eyes every evening.    LISINOPRIL (PRINIVIL,ZESTRIL) 30 MG TABLET    TAKE 1 TABLET EVERY DAY.    METHOCARBAMOL (ROBAXIN) 500 MG TAB    Take 500 mg by mouth 2 (two) times daily as needed.    MULTIVITAMIN-IRON-FOLIC ACID (SENTRY) TAB    Take 1 tablet by mouth once daily.    OMALIZUMAB (XOLAIR) 150 MG/ML INJECTION    Inject 2 mLs (300 mg total) into the skin every 14 (fourteen) days.    OMALIZUMAB (XOLAIR) 75 MG/0.5 ML INJECTION    Inject 0.5 mLs (75 mg total) into the skin every 14 (fourteen) days.    PANTOPRAZOLE (PROTONIX) 40 MG TABLET    Take 1 tablet (40 mg total) by mouth once daily.    PAPAVERINE-PHENTOLAMIN-ALPROST 150 MG-5 MG- 50 MCG SOLR    0.5 mLs by Intracavernosal route as needed (ED).    PEN NEEDLE, DIABETIC (BD ULTRA-FINE SHORT PEN NEEDLE) 31 GAUGE X 5/16" NDLE    1 each by Misc.(Non-Drug; Combo Route) route 3 (three) times daily.    PEP INJECTION    Inject 0.15 ml as directed     For compounding pharmacy use:   Add PAPAVERINE 30 mg  Add PHENTOLAMINE 1 mg  Add ALPROSTADIL 20 mcg    PREGABALIN (LYRICA) 100 MG CAPSULE    Take 1 capsule (100 mg total) by mouth 3 (three) times daily.    PROMETHAZINE-DEXTROMETHORPHAN (PROMETHAZINE-DM) " 6.25-15 MG/5 ML SYRP    Take 5 mLs by mouth nightly as needed (cough).    RANOLAZINE (RANEXA) 1,000 MG TB12    TAKE 1 TABLET TWICE DAILY    SEMAGLUTIDE (OZEMPIC) 2 MG/DOSE (8 MG/3 ML) PNIJ    Inject 2 mg into the skin every 7 days.    SERTRALINE (ZOLOFT) 100 MG TABLET    Take 1 tablet (100 mg total) by mouth every evening.    TRAZODONE (DESYREL) 100 MG TABLET    TAKE 2 TABLETS EVERY EVENING    VIOS AEROSOL DELIVERY SYSTEM VIN    USE AS DIRESTED   Modified Medications    No medications on file   Discontinued Medications    No medications on file       Family History   Problem Relation Name Age of Onset    Hypertension Mother      Glaucoma Mother      Cataracts Mother      Diabetes Mother      Cataracts Father      Stroke Father      Glaucoma Sister 4         x3    Cataracts Sister 4         x3    Diabetes Sister 4         x1    Stroke Sister 4         x1    No Known Problems Brother 2     No Known Problems Daughter 2     No Known Problems Son 3     Glaucoma Maternal Aunt      Diabetes Maternal Aunt      Cancer Maternal Grandfather          lung (smoker)    Prostate cancer Neg Hx      Heart disease Neg Hx      Kidney disease Neg Hx         Review of patient's allergies indicates:   Allergen Reactions    Protamine Hives     Urticaria, possible upper airway swelling         Objective:      Physical Exam  Patient is alert and oriented, no distress. Skin is intact. Neuro is normal with no focal motor or sensory findings.    Cervical exam is unremarkable. Intact cervical ROM. Negative Spurling's test    Physical Exam:  RIGHT    LEFT    Scap Dyskinesis/Winging (-)    (-)    Tenderness:          Greater Tuberosity  +    (-)  Bicipital Groove  (-)    (-)  AC joint   (-)    (-)  Other:     ROM:  Forward Elevation 120    160  Abduction  80    110  ER (at side)  20    50  IR   Back pocket   Back pocket    Strength:   Supraspinatus  4/5    4+/5  Infraspinatus  4/5    4+/5  Subscap / IR  5/5     5/5    Special  Tests:   Neer:    +    (-)   Arevalo:   +    (-)   SS Stress:   +    (-)   Bear Hug:   (-)    (-)   Woodward's:   +    (-)   Resisted Thrower's:   +    (-)   Cross Arm Abduction:  (-)    (-)    Neurovascular examination  - Motor grossly intact bilaterally to shoulder abduction, elbow flexion and extension, wrist flexion and extension, and intrinsic hand musculature  - Sensation intact to light touch bilaterally in axillary, median, radial, and ulnar distributions  - Symmetrical radial pulses    Imaging:    XR Results:  Results for orders placed during the hospital encounter of 01/29/24    X-Ray Shoulder 2 or More views Bilateral    Narrative  EXAMINATION:  XR SHOULDER COMPLETE 2 OR MORE VIEWS BILATERAL    CLINICAL HISTORY:  Pain in right shoulder    TECHNIQUE:  Four views were obtained of the bilateral shoulders.    COMPARISON:  03/29/2022    FINDINGS:  Postoperative findings from prior left shoulder arthroplasty are again noted with reversed prosthesis in place.  No findings to suggest hardware complication.  No acute fractures or dislocations visualized.  Mild AC joint arthrosis on the left is unchanged.  There is moderate glenohumeral osteoarthritis on the right as well as mild right-sided AC joint arthrosis.    Impression  Degenerative and postoperative findings as above      Electronically signed by: Jame Stephens MD  Date:    01/29/2024  Time:    12:25      MRI Results:  No results found for this or any previous visit.      CT Results:  No results found for this or any previous visit.      Physician read: I agree with the above impression.    Assessment/Plan:   Crow Green is a 69 y.o. male with right shoulder advanced glenohumeral osteoarthritis, rotator cuff tendinopathy, and 2 years s/p left reverse TSA (DOS: 1/10/22) with intermittent pain    Plan:    He has known end stage glenohumeral arthritis of his right shoulder as result of rotator cuff tendinopathy. We have previously discussed the  possibility of moving forward with shoulder replacement but he continues to have pain in his left shoulder following previous rTSA, despite no radiographic evidence of loosening. Due to his continued symptoms I am hesitant to proceed with shoulder replacement on the right at this time.   He has also seen Dr. Horner and they had discussed planned to move forward with suprascapular and axillary nerve block taht was scheduled for today but due to transportation requirements for the procedure it had to be rescheduled.   I recommend proceeding with intra-articular corticosteroid injection into the right glenohumeral joint. We will also send a message to Dr. Horner office about getting his procedure rescheduled as well. The patient is in agreement with this plan.   Procedure performed today and patient tolerated the procedure well with no immediate complications.    Follow up with me as needed.         Anthony Alvarado MD    I, Femi Vann, acted as a scribe for Anthony Alvarado MD for the duration of this office visit.

## 2024-08-09 ENCOUNTER — TELEPHONE (OUTPATIENT)
Dept: INTERNAL MEDICINE | Facility: CLINIC | Age: 69
End: 2024-08-09
Payer: MEDICARE

## 2024-08-13 ENCOUNTER — CLINICAL SUPPORT (OUTPATIENT)
Dept: ALLERGY | Facility: CLINIC | Age: 69
End: 2024-08-13
Payer: MEDICARE

## 2024-08-13 ENCOUNTER — TELEPHONE (OUTPATIENT)
Dept: INTERNAL MEDICINE | Facility: CLINIC | Age: 69
End: 2024-08-13
Payer: MEDICARE

## 2024-08-13 ENCOUNTER — OFFICE VISIT (OUTPATIENT)
Dept: SPORTS MEDICINE | Facility: CLINIC | Age: 69
End: 2024-08-13
Payer: MEDICARE

## 2024-08-13 ENCOUNTER — OFFICE VISIT (OUTPATIENT)
Dept: ALLERGY | Facility: CLINIC | Age: 69
End: 2024-08-13
Payer: MEDICARE

## 2024-08-13 VITALS
DIASTOLIC BLOOD PRESSURE: 60 MMHG | BODY MASS INDEX: 30.82 KG/M2 | HEART RATE: 82 BPM | TEMPERATURE: 98 F | SYSTOLIC BLOOD PRESSURE: 90 MMHG | WEIGHT: 185.19 LBS

## 2024-08-13 VITALS — BODY MASS INDEX: 31.59 KG/M2 | HEIGHT: 65 IN | WEIGHT: 189.63 LBS

## 2024-08-13 DIAGNOSIS — M75.101 ROTATOR CUFF TEAR ARTHROPATHY OF RIGHT SHOULDER: ICD-10-CM

## 2024-08-13 DIAGNOSIS — J30.89 ALLERGIC RHINITIS DUE TO AMERICAN HOUSE DUST MITE: Primary | ICD-10-CM

## 2024-08-13 DIAGNOSIS — J45.40 MODERATE PERSISTENT ASTHMA WITHOUT COMPLICATION: Primary | ICD-10-CM

## 2024-08-13 DIAGNOSIS — M19.011 GLENOHUMERAL ARTHRITIS, RIGHT: Primary | ICD-10-CM

## 2024-08-13 DIAGNOSIS — J45.50 SEVERE PERSISTENT EXTRINSIC ASTHMA WITHOUT COMPLICATION: ICD-10-CM

## 2024-08-13 DIAGNOSIS — M12.811 ROTATOR CUFF TEAR ARTHROPATHY OF RIGHT SHOULDER: ICD-10-CM

## 2024-08-13 PROCEDURE — 3078F DIAST BP <80 MM HG: CPT | Mod: CPTII,S$GLB,, | Performed by: ALLERGY & IMMUNOLOGY

## 2024-08-13 PROCEDURE — 4010F ACE/ARB THERAPY RXD/TAKEN: CPT | Mod: CPTII,S$GLB,, | Performed by: STUDENT IN AN ORGANIZED HEALTH CARE EDUCATION/TRAINING PROGRAM

## 2024-08-13 PROCEDURE — 3008F BODY MASS INDEX DOCD: CPT | Mod: CPTII,S$GLB,, | Performed by: STUDENT IN AN ORGANIZED HEALTH CARE EDUCATION/TRAINING PROGRAM

## 2024-08-13 PROCEDURE — 99214 OFFICE O/P EST MOD 30 MIN: CPT | Mod: 25,S$GLB,, | Performed by: STUDENT IN AN ORGANIZED HEALTH CARE EDUCATION/TRAINING PROGRAM

## 2024-08-13 PROCEDURE — 3074F SYST BP LT 130 MM HG: CPT | Mod: CPTII,S$GLB,, | Performed by: ALLERGY & IMMUNOLOGY

## 2024-08-13 PROCEDURE — 3288F FALL RISK ASSESSMENT DOCD: CPT | Mod: CPTII,S$GLB,, | Performed by: STUDENT IN AN ORGANIZED HEALTH CARE EDUCATION/TRAINING PROGRAM

## 2024-08-13 PROCEDURE — 1159F MED LIST DOCD IN RCRD: CPT | Mod: CPTII,S$GLB,, | Performed by: STUDENT IN AN ORGANIZED HEALTH CARE EDUCATION/TRAINING PROGRAM

## 2024-08-13 PROCEDURE — 1125F AMNT PAIN NOTED PAIN PRSNT: CPT | Mod: CPTII,S$GLB,, | Performed by: STUDENT IN AN ORGANIZED HEALTH CARE EDUCATION/TRAINING PROGRAM

## 2024-08-13 PROCEDURE — 1160F RVW MEDS BY RX/DR IN RCRD: CPT | Mod: CPTII,S$GLB,, | Performed by: STUDENT IN AN ORGANIZED HEALTH CARE EDUCATION/TRAINING PROGRAM

## 2024-08-13 PROCEDURE — 99999 PR PBB SHADOW E&M-EST. PATIENT-LVL III: CPT | Mod: PBBFAC,,,

## 2024-08-13 PROCEDURE — 3008F BODY MASS INDEX DOCD: CPT | Mod: CPTII,S$GLB,, | Performed by: ALLERGY & IMMUNOLOGY

## 2024-08-13 PROCEDURE — 1101F PT FALLS ASSESS-DOCD LE1/YR: CPT | Mod: CPTII,S$GLB,, | Performed by: ALLERGY & IMMUNOLOGY

## 2024-08-13 PROCEDURE — 99999 PR PBB SHADOW E&M-EST. PATIENT-LVL IV: CPT | Mod: PBBFAC,,, | Performed by: ALLERGY & IMMUNOLOGY

## 2024-08-13 PROCEDURE — 99999 PR PBB SHADOW E&M-EST. PATIENT-LVL IV: CPT | Mod: PBBFAC,,, | Performed by: STUDENT IN AN ORGANIZED HEALTH CARE EDUCATION/TRAINING PROGRAM

## 2024-08-13 PROCEDURE — 3044F HG A1C LEVEL LT 7.0%: CPT | Mod: CPTII,S$GLB,, | Performed by: STUDENT IN AN ORGANIZED HEALTH CARE EDUCATION/TRAINING PROGRAM

## 2024-08-13 PROCEDURE — 20610 DRAIN/INJ JOINT/BURSA W/O US: CPT | Mod: RT,S$GLB,, | Performed by: STUDENT IN AN ORGANIZED HEALTH CARE EDUCATION/TRAINING PROGRAM

## 2024-08-13 PROCEDURE — 1125F AMNT PAIN NOTED PAIN PRSNT: CPT | Mod: CPTII,S$GLB,, | Performed by: ALLERGY & IMMUNOLOGY

## 2024-08-13 PROCEDURE — 4010F ACE/ARB THERAPY RXD/TAKEN: CPT | Mod: CPTII,S$GLB,, | Performed by: ALLERGY & IMMUNOLOGY

## 2024-08-13 PROCEDURE — 3044F HG A1C LEVEL LT 7.0%: CPT | Mod: CPTII,S$GLB,, | Performed by: ALLERGY & IMMUNOLOGY

## 2024-08-13 PROCEDURE — 96372 THER/PROPH/DIAG INJ SC/IM: CPT | Mod: S$GLB,,, | Performed by: ALLERGY & IMMUNOLOGY

## 2024-08-13 PROCEDURE — 1101F PT FALLS ASSESS-DOCD LE1/YR: CPT | Mod: CPTII,S$GLB,, | Performed by: STUDENT IN AN ORGANIZED HEALTH CARE EDUCATION/TRAINING PROGRAM

## 2024-08-13 PROCEDURE — 94375 RESPIRATORY FLOW VOLUME LOOP: CPT | Mod: S$GLB,,, | Performed by: ALLERGY & IMMUNOLOGY

## 2024-08-13 PROCEDURE — 3288F FALL RISK ASSESSMENT DOCD: CPT | Mod: CPTII,S$GLB,, | Performed by: ALLERGY & IMMUNOLOGY

## 2024-08-13 PROCEDURE — 99215 OFFICE O/P EST HI 40 MIN: CPT | Mod: 25,S$GLB,, | Performed by: ALLERGY & IMMUNOLOGY

## 2024-08-13 RX ORDER — TRIAMCINOLONE ACETONIDE 40 MG/ML
40 INJECTION, SUSPENSION INTRA-ARTICULAR; INTRAMUSCULAR
Status: DISCONTINUED | OUTPATIENT
Start: 2024-08-13 | End: 2024-08-13 | Stop reason: HOSPADM

## 2024-08-13 RX ADMIN — TRIAMCINOLONE ACETONIDE 40 MG: 40 INJECTION, SUSPENSION INTRA-ARTICULAR; INTRAMUSCULAR at 08:08

## 2024-08-13 NOTE — PROGRESS NOTES
"Subjective:   Patient: Crow Green [id number], :1955   Visit date:2024 2:39 PM    Chief Complaint:  Follow-up      HPI:    Prior notes reviewed by myself.  Clinical documentation obtained by nursing staff reviewed.         HPI 2024: 69 year old male with a history of Allergic Rhinitis- dust mites, elevated IgE, Asthma- previously on Xolair.  He stopped Xolair.  He reports not feeling an improvement in his allergy symptoms with Xolair.  He reports using albuterol- 1-2 times a day.  Not required oral steroids.        HPI 4/15/2024: 68 year old male with a history of elevated IgE, dust mite allergy, asthma, dermatomyositis and COPD here for follow up. Xolair has been ordered numerous times, but he has not started.  He reports significant shortness of breath that he relates to recent smoke exposure at his neighbor's home on yesterday.          2023: 68 year old male with a history of elevated igE, dust mite allergy, asthma, dermatomyositis , and COPD here for follow up.  Xolair was ordered previously, but he did not start it.  Signed Xolair consent in January.  He is on O2 intermittently.  Last required albuterol yesterday. Requires daily      Dry skin- arms, head and neck          2023: 67 year old male with asthma, COPD and dust mite allergy.  He is on O2 "all the time". He currently does not have O2 on today in the office- "I don't want to travel with that big tank on me."  Last required his rescue inhaler yesterday. He reports requiring it daily.  He reports that exertion exacerbates symptoms.  He reports coughing and wheezing at night.  He denies requiring oral steroids or a steroid injection.  He reports that he is taking Flonase, which helps his runny nose and nasal congestion.    HPI:     24 - 66 yo M referred by pulm for an evaluation of elevated IgE:    Hx of copd and asthma ; advair and albuterol  Hx of SANDRITA - CPAP  Hx of CHF - follows with cardiologist    Elevated IgE " (8006)  Never tested for allergies, has not received AIT  Ssx - rhinorrhea, nasal congestion, sneezing, watery eyes; intermittent,, year round  Meds - otc allergy eye drops; no anti-histamines    No food allergies    Environmental History:  Environmental Hx: Pets in the home: none. Does not smoke.       Past Medical History:   Diagnosis Date    Arthritis     Asthma     Atrial fibrillation     Atrial fibrillation with RVR 08/07/2019    Carotid artery stenosis     CHF (congestive heart failure)     Chronic obstructive pulmonary disease 08/05/2020    Depression     Dermatomyositis     Diabetes mellitus, type 2 Diagnosed in 2000    Elevated coronary artery calcium score 02/14/2024    Elevated PSA     Follow up 07/30/2020    Hypertension     Myocardial infarction     2017    Sleep apnea         Family History   Problem Relation Name Age of Onset    Hypertension Mother      Glaucoma Mother      Cataracts Mother      Diabetes Mother      Cataracts Father      Stroke Father      Glaucoma Sister 4         x3    Cataracts Sister 4         x3    Diabetes Sister 4         x1    Stroke Sister 4         x1    No Known Problems Brother 2     No Known Problems Daughter 2     No Known Problems Son 3     Glaucoma Maternal Aunt      Diabetes Maternal Aunt      Cancer Maternal Grandfather          lung (smoker)    Prostate cancer Neg Hx      Heart disease Neg Hx      Kidney disease Neg Hx         Social History     Socioeconomic History    Marital status: Legally     Number of children: 5   Occupational History    Occupation: disabled    Occupation: PT at [employer]    Tobacco Use    Smoking status: Never    Smokeless tobacco: Never   Substance and Sexual Activity    Alcohol use: Yes     Comment: rare, maybe holidays    Drug use: Not Currently     Types: Cocaine    Sexual activity: Not Currently     Partners: Female   Social History Narrative    Utilizing [employer] transportation which has been unreliable.       Social Determinants of Health     Financial Resource Strain: Low Risk  (7/25/2023)    [facility]    Overall Financial Resource Strain (CARDIA)     Difficulty of Paying Living Expenses: Not hard at all   Food Insecurity: No Food Insecurity (7/25/2023)    [facility]    Hunger Vital Sign     Worried About Running Out of Food in the Last Year: Never true     Ran Out of Food in the Last Year: Never true   Transportation Needs: No Transportation Needs (7/25/2023)    [facility], [facility]    PRAPARE - Transportation     Lack of Transportation (Medical): No     Lack of Transportation (Non-Medical): No   Physical Activity: Inactive (12/7/2022)    Exercise Vital Sign     Days of Exercise per Week: 0 days     Minutes of Exercise per Session: 0 min   Stress: No Stress Concern Present (7/25/2023)    [facility], [facility]    [place] [facility] [employer] - Occupational Stress Questionnaire     Feeling of Stress : Not at all   Housing Stability: Unknown (7/25/2023)    [facility]    Housing Stability Vital Sign     Unable to Pay for Housing in the Last Year: No       Review  of patient's allergies indicates:   Allergen Reactions    Protamine Hives     Urticaria, possible upper airway swelling           Medication List with Changes/Refills   Current Medications    ALBUTEROL (PROVENTIL) 2.5 MG /3 ML (0.083 %) NEBULIZER SOLUTION    INHALE THE CONTENTS OF 1 VIAL VIA NEBULIZER EVERY 4 TO 6 HOURS AS NEEDED FOR WHEEZING OR FOR SHORTNESS OF BREATH    ALBUTEROL (PROVENTIL/VENTOLIN HFA) 90 MCG/ACTUATION INHALER    Inhale 2 puffs into the lungs every 4 (four) hours as needed for Wheezing or Shortness of Breath.    ALLOPURINOL (ZYLOPRIM) 100 MG TABLET    TAKE 1 TABLET ONE TIME DAILY    AMITRIPTYLINE (ELAVIL) 25 MG TABLET    Take 1 tablet (25 mg total) by mouth every evening.    AMLODIPINE (NORVASC) 2.5 MG TABLET    Take 1 tablet (2.5 mg total) by mouth once daily.    ATORVASTATIN (LIPITOR) 40 MG TABLET    Take 1 tablet (40 mg total) by mouth every evening.    AZITHROMYCIN (Z-HOLDEN) 250 MG TABLET    Take 2 tablets by mouth on day 1; Take 1 tablet by mouth on days 2-5    BLOOD SUGAR DIAGNOSTIC (TRUE METRIX GLUCOSE TEST STRIP) STRP    Inject 1 each into the skin 4 (four) times daily.    BLOOD-GLUCOSE METER MISC    Use as instructed to test blood glucose meter daily.    BRINZOLAMIDE-BRIMONIDINE (SIMBRINZA) 1-0.2 % DRPS    Place 1 drop into both eyes 3 (three) times daily.    CYANOCOBALAMIN (VITAMIN B-12) 1000 MCG TABLET    Take 1 tablet by mouth once daily.    DEXCOM G7 SENSOR VIN    Change sensor every 10 days    ELIQUIS 5 MG TAB    TAKE 1 TABLET TWICE DAILY    EMPAGLIFLOZIN (JARDIANCE) 25 MG TABLET    Take 1 tablet (25 mg total) by mouth once daily.    EPINEPHRINE (EPIPEN) 0.3 MG/0.3 ML ATIN    Inject 0.3 mLs (0.3 mg total) into the muscle once. for 1 dose    FLUTICASONE-UMECLIDIN-VILANTER (TRELEGY ELLIPTA) 200-62.5-25 MCG INHALER    Inhale 1 puff into the lungs once daily.    FUROSEMIDE (LASIX) 40 MG TABLET    Take 2.5 tablets (100 mg total) by mouth 2 (two) times a day.    GABAPENTIN (NEURONTIN) 800  "MG TABLET    Take 1 tablet (800 mg total) by mouth 3 (three) times daily.    INSULIN ASPART U-100 (NOVOLOG FLEXPEN U-100 INSULIN) 100 UNIT/ML (3 ML) INPN PEN    INJECT 10 UNITS UNDER THE SKIN THREE TIMES DAILY WITH MEALS    INV ALLOPURINOL TABLET    Take 1 tablet by mouth once daily.    LANCETS (TRUEPLUS LANCETS) 33 GAUGE MISC    Use to check blood sugar twice a day    LANCETS (TRUEPLUS LANCETS) 33 GAUGE MISC    USE AS DIRECTED TO TEST BLOOD SUGAR FOUR TIMES DAILY    LATANOPROST 0.005 % OPHTHALMIC SOLUTION    Place 1 drop into both eyes every evening.    LISINOPRIL (PRINIVIL,ZESTRIL) 30 MG TABLET    TAKE 1 TABLET EVERY DAY.    METHOCARBAMOL (ROBAXIN) 500 MG TAB    Take 500 mg by mouth 2 (two) times daily as needed.    MULTIVITAMIN-IRON-FOLIC ACID (SENTRY) TAB    Take 1 tablet by mouth once daily.    OMALIZUMAB (XOLAIR) 150 MG/ML INJECTION    Inject 2 mLs (300 mg total) into the skin every 14 (fourteen) days.    OMALIZUMAB (XOLAIR) 75 MG/0.5 ML INJECTION    Inject 0.5 mLs (75 mg total) into the skin every 14 (fourteen) days.    PANTOPRAZOLE (PROTONIX) 40 MG TABLET    Take 1 tablet (40 mg total) by mouth once daily.    PAPAVERINE-PHENTOLAMIN-ALPROST 150 MG-5 MG- 50 MCG SOLR    0.5 mLs by Intracavernosal route as needed (ED).    PEN NEEDLE, DIABETIC (BD ULTRA-FINE SHORT PEN NEEDLE) 31 GAUGE X 5/16" NDLE    1 each by Misc.(Non-Drug; Combo Route) route 3 (three) times daily.    PEP INJECTION    Inject 0.15 ml as directed     For compounding pharmacy use:   Add PAPAVERINE 30 mg  Add PHENTOLAMINE 1 mg  Add ALPROSTADIL 20 mcg    PREGABALIN (LYRICA) 100 MG CAPSULE    Take 1 capsule (100 mg total) by mouth 3 (three) times daily.    PROMETHAZINE-DEXTROMETHORPHAN (PROMETHAZINE-DM) 6.25-15 MG/5 ML SYRP    Take 5 mLs by mouth nightly as needed (cough).    RANOLAZINE (RANEXA) 1,000 MG TB12    TAKE 1 TABLET TWICE DAILY    SEMAGLUTIDE (OZEMPIC) 2 MG/DOSE (8 MG/3 ML) PNIJ    Inject 2 mg into the skin every 7 days.    SERTRALINE " (ZOLOFT) 100 MG TABLET    Take 1 tablet (100 mg total) by mouth every evening.    TRAZODONE (DESYREL) 100 MG TABLET    TAKE 2 TABLETS EVERY EVENING    VIOS AEROSOL DELIVERY SYSTEM VIN    USE AS DIRESTED         Objective:     Physical Exam:  Vitals:  BP 90/60 (BP Location: Right arm, Patient Position: Sitting)   Pulse 82   Temp 97.9 °F (36.6 °C)   Wt 84 kg (185 lb 3 oz)   BMI 30.82 kg/m²   Constitutional:       General: No acute distress. Alert, well developed.   HENT:      Head: Normocephalic, no lesions.      Mouth/Throat: No oropharyngeal exudate or posterior oropharyngeal erythema.   Eyes:      General: No scleral icterus.Sclera white, extraocular movements intact.        Right eye: No discharge.         Left eye: No discharge.   Neck: Supple, no palpable nodes, no masses, trachea midline, no thyromegaly.   Cardiovascular:      Rate and Rhythm: Normal rate and regular rhythm.      Heart sounds: Normal heart sounds. No murmur heard.   Pulmonary:      Effort: Pulmonary effort is normal. No respiratory distress.      Breath sounds: Normal breath sounds. No stridor. No rhonchi, or rales. No crackles or wheezing.   Musculoskeletal:         General: No swelling, tenderness, deformity or signs of injury.    Skin:     General: Skin is warm.      Coloration: Skin is not jaundiced or pale.      Findings: No bruising, erythema, or rash.   Neurological:      Alert. Moves all extremities spontaneously  Psychiatric:         Mood is normal. Behavior is normal.            Spirometry  FEV1  98%  FVC  85%  FEV1/FVC   112%  Normal          Assessment & Plan:   Allergic rhinitis due to [employer] house dust mite    Severe persistent extrinsic asthma without complication      Spirometry- normal today.      Will restart Xolair.  Continue other medications      Visit today included increased complexity associated with the care of the episodic problem Allergic rhinitis and asthma addressed and managing the longitudinal care of the  patient due to the serious and/or complex managed problem(s)  Allergic rhinitis and asthma.     RTC 3-4 months   ELISEO VERAS spent a total of 55 minutes on the day of the visit.This includes face to face time and non-face to face time preparing to see the patient (eg, review of tests), obtaining and/or reviewing separately obtained history, documenting clinical information in the electronic or other health record, independently interpreting results and communicating results to the patient/family/caregiver, or care coordinator.

## 2024-08-13 NOTE — PROCEDURES
Large Joint Aspiration/Injection: R glenohumeral    Date/Time: 8/13/2024 8:15 AM    Performed by: Anthony Alvarado MD  Authorized by: Anthony Alvarado MD    Consent Done?:  Yes (Verbal)  Indications:  Pain and arthritis  Site marked: the procedure site was marked    Timeout: prior to procedure the correct patient, procedure, and site was verified    Prep: patient was prepped and draped in usual sterile fashion      Local anesthesia used?: Yes    Anesthesia:  Local infiltration  Local anesthetic:  Lidocaine 1% without epinephrine    Details:  Needle Size:  22 G  Ultrasonic Guidance for needle placement?: No    Approach:  Posterior  Location:  Shoulder  Site:  R glenohumeral  Medications:  40 mg triamcinolone acetonide 40 mg/mL  Patient tolerance:  Patient tolerated the procedure well with no immediate complications

## 2024-08-13 NOTE — TELEPHONE ENCOUNTER
Tried calling the Celia with [employer]  with no answer. The patient see Debbie Escobar in diabetes.

## 2024-08-13 NOTE — PROGRESS NOTES
Xolair administered.  Patient waited in clinic for 30 mins for observation.  Patient had Epi Pen on hand.

## 2024-08-13 NOTE — TELEPHONE ENCOUNTER
----- Message from Aubrey Farrar sent at 8/13/2024  1:52 PM CDT -----  Celia with [employer] is calling in regards to see if diabetic shoe order was received. Please fax to [phone]. Celia can be reached [phone].        Thanks  MICHAEL

## 2024-08-15 ENCOUNTER — TELEPHONE (OUTPATIENT)
Dept: INTERNAL MEDICINE | Facility: CLINIC | Age: 69
End: 2024-08-15
Payer: MEDICARE

## 2024-08-15 ENCOUNTER — OFFICE VISIT (OUTPATIENT)
Dept: INTERNAL MEDICINE | Facility: CLINIC | Age: 69
End: 2024-08-15
Payer: MEDICARE

## 2024-08-15 VITALS
HEART RATE: 81 BPM | WEIGHT: 178.81 LBS | DIASTOLIC BLOOD PRESSURE: 70 MMHG | HEIGHT: 65 IN | TEMPERATURE: 96 F | SYSTOLIC BLOOD PRESSURE: 110 MMHG | BODY MASS INDEX: 29.79 KG/M2

## 2024-08-15 DIAGNOSIS — Z79.4 TYPE 2 DIABETES MELLITUS WITH STAGE 3A CHRONIC KIDNEY DISEASE, WITH LONG-TERM CURRENT USE OF INSULIN: ICD-10-CM

## 2024-08-15 DIAGNOSIS — M47.26 OSTEOARTHRITIS OF SPINE WITH RADICULOPATHY, LUMBAR REGION: ICD-10-CM

## 2024-08-15 DIAGNOSIS — I65.23 BILATERAL CAROTID ARTERY STENOSIS: Primary | ICD-10-CM

## 2024-08-15 DIAGNOSIS — E11.22 TYPE 2 DIABETES MELLITUS WITH STAGE 3A CHRONIC KIDNEY DISEASE, WITH LONG-TERM CURRENT USE OF INSULIN: ICD-10-CM

## 2024-08-15 DIAGNOSIS — N18.31 TYPE 2 DIABETES MELLITUS WITH STAGE 3A CHRONIC KIDNEY DISEASE, WITH LONG-TERM CURRENT USE OF INSULIN: ICD-10-CM

## 2024-08-15 PROCEDURE — 1159F MED LIST DOCD IN RCRD: CPT | Mod: CPTII,S$GLB,, | Performed by: FAMILY MEDICINE

## 2024-08-15 PROCEDURE — 99214 OFFICE O/P EST MOD 30 MIN: CPT | Mod: S$GLB,,, | Performed by: FAMILY MEDICINE

## 2024-08-15 PROCEDURE — 99999 PR PBB SHADOW E&M-EST. PATIENT-LVL V: CPT | Mod: PBBFAC,,, | Performed by: FAMILY MEDICINE

## 2024-08-15 PROCEDURE — 3074F SYST BP LT 130 MM HG: CPT | Mod: CPTII,S$GLB,, | Performed by: FAMILY MEDICINE

## 2024-08-15 PROCEDURE — 3288F FALL RISK ASSESSMENT DOCD: CPT | Mod: CPTII,S$GLB,, | Performed by: FAMILY MEDICINE

## 2024-08-15 PROCEDURE — 3044F HG A1C LEVEL LT 7.0%: CPT | Mod: CPTII,S$GLB,, | Performed by: FAMILY MEDICINE

## 2024-08-15 PROCEDURE — 1125F AMNT PAIN NOTED PAIN PRSNT: CPT | Mod: CPTII,S$GLB,, | Performed by: FAMILY MEDICINE

## 2024-08-15 PROCEDURE — G2211 COMPLEX E/M VISIT ADD ON: HCPCS | Mod: S$GLB,,, | Performed by: FAMILY MEDICINE

## 2024-08-15 PROCEDURE — 1101F PT FALLS ASSESS-DOCD LE1/YR: CPT | Mod: CPTII,S$GLB,, | Performed by: FAMILY MEDICINE

## 2024-08-15 PROCEDURE — 4010F ACE/ARB THERAPY RXD/TAKEN: CPT | Mod: CPTII,S$GLB,, | Performed by: FAMILY MEDICINE

## 2024-08-15 PROCEDURE — 3008F BODY MASS INDEX DOCD: CPT | Mod: CPTII,S$GLB,, | Performed by: FAMILY MEDICINE

## 2024-08-15 PROCEDURE — 3078F DIAST BP <80 MM HG: CPT | Mod: CPTII,S$GLB,, | Performed by: FAMILY MEDICINE
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
## 2024-08-15 NOTE — TELEPHONE ENCOUNTER
----- Message from Don Dodd sent at 8/15/2024  9:55 AM CDT -----  Contact: Crow  .Type:  Needs Medical Advice    Who Called:  Crow     Would the patient rather a call back or a response via [facility]?  Call back in needed   Best Call Back Number:  [phone]    Additional Information:  pt states he will be around 30 mins late due to transportation issues.  Pt states the  is on the way now to bring him to the appt.        Thanks

## 2024-08-16 DIAGNOSIS — I65.23 BILATERAL CAROTID ARTERY STENOSIS: Primary | ICD-10-CM

## 2024-08-16 NOTE — PRE-PROCEDURE INSTRUCTIONS
Spoke with patient regarding procedure scheduled on 8.28    Arrival time 1030     Has patient been sick with fever or on antibiotics within the last 7 days? No     Does the patient have any open wounds, sores or rashes? No     Does the patient have any recent fractures? no     Has patient received a vaccination within the last 7 days? No     Received the COVID vaccination? yes     Has the patient stopped all medications as directed? na     Does patient have a pacemaker, defibrillator, or implantable stimulator? No     Does the patient have a ride to and from procedure and someone reliable to remain with patient?  coordinating transportation. Aware of policy      Is the patient diabetic? yes     Does the patient have sleep apnea? Or use O2 at home? brijesh     Is the patient receiving sedation? Yes      Is the patient instructed to remain NPO beginning at midnight the night before their procedure? yes     Procedure location confirmed with patient? Yes     Covid- Denies signs/symptoms. Instructed to notify PAT/MD if any changes.

## 2024-08-20 ENCOUNTER — TELEPHONE (OUTPATIENT)
Dept: NEUROLOGY | Facility: CLINIC | Age: 69
End: 2024-08-20
Payer: MEDICARE

## 2024-08-20 NOTE — TELEPHONE ENCOUNTER
Attempted to leave the patient a voicemail to reschedule the appointment for 10/08 due to Dr. Hope being out. Patient's voicemail is not set up.

## 2024-08-26 ENCOUNTER — TELEPHONE (OUTPATIENT)
Dept: INTERNAL MEDICINE | Facility: CLINIC | Age: 69
End: 2024-08-26
Payer: MEDICARE

## 2024-08-26 DIAGNOSIS — E11.42 DIABETIC POLYNEUROPATHY ASSOCIATED WITH TYPE 2 DIABETES MELLITUS: ICD-10-CM

## 2024-08-26 DIAGNOSIS — F25.1 SCHIZOAFFECTIVE DISORDER, DEPRESSIVE TYPE: ICD-10-CM

## 2024-08-26 DIAGNOSIS — M47.26 OSTEOARTHRITIS OF SPINE WITH RADICULOPATHY, LUMBAR REGION: Primary | ICD-10-CM

## 2024-08-26 NOTE — TELEPHONE ENCOUNTER
----- Message from Simi Serrato sent at 8/26/2024 11:43 AM CDT -----  Contact: Crow  .Type:  Patient Returning Call    Who Called: Crow   Who Left Message for Patient: unknown   Does the patient know what this is regarding?: unknown, pt states getting a missed call from this office   Would the patient rather a call back or a response via [facility]?  Call   Best Call Back Number:.[phone]  Additional Information:

## 2024-08-26 NOTE — TELEPHONE ENCOUNTER
Spoke with the patient stated that he need two referrals   Home health and Psychiatry    The patient stated that the home health service stop a few month back.   The patient will bring back the sign forms for his diabetic shoes and orders for his wheel chair.

## 2024-08-28 ENCOUNTER — HOSPITAL ENCOUNTER (OUTPATIENT)
Facility: HOSPITAL | Age: 69
Discharge: HOME | End: 2024-08-28
Attending: ANESTHESIOLOGY | Admitting: ANESTHESIOLOGY
Payer: MEDICARE

## 2024-08-28 ENCOUNTER — OFFICE VISIT (OUTPATIENT)
Dept: PULMONOLOGY | Facility: CLINIC | Age: 69
End: 2024-08-28
Payer: MEDICARE

## 2024-08-28 VITALS
SYSTOLIC BLOOD PRESSURE: 116 MMHG | RESPIRATION RATE: 17 BRPM | OXYGEN SATURATION: 98 % | BODY MASS INDEX: 29.79 KG/M2 | WEIGHT: 178.81 LBS | HEIGHT: 65 IN | HEART RATE: 85 BPM | DIASTOLIC BLOOD PRESSURE: 80 MMHG

## 2024-08-28 VITALS
BODY MASS INDEX: 29.38 KG/M2 | SYSTOLIC BLOOD PRESSURE: 95 MMHG | WEIGHT: 176.38 LBS | DIASTOLIC BLOOD PRESSURE: 63 MMHG | RESPIRATION RATE: 20 BRPM | OXYGEN SATURATION: 100 % | HEIGHT: 65 IN | HEART RATE: 65 BPM | TEMPERATURE: 96 F

## 2024-08-28 DIAGNOSIS — M12.812 ROTATOR CUFF ARTHROPATHY OF LEFT SHOULDER: Primary | ICD-10-CM

## 2024-08-28 DIAGNOSIS — M12.812 ROTATOR CUFF ARTHROPATHY OF LEFT SHOULDER: ICD-10-CM

## 2024-08-28 DIAGNOSIS — J45.909 EXTRINSIC ASTHMA, UNSPECIFIED ASTHMA SEVERITY, UNSPECIFIED WHETHER COMPLICATED, UNSPECIFIED WHETHER PERSISTENT: Primary | ICD-10-CM

## 2024-08-28 LAB — POCT GLUCOSE: 120 MG/DL (ref 70–110)

## 2024-08-28 PROCEDURE — 4010F ACE/ARB THERAPY RXD/TAKEN: CPT | Mod: CPTII,S$GLB,, | Performed by: INTERNAL MEDICINE

## 2024-08-28 PROCEDURE — 1159F MED LIST DOCD IN RCRD: CPT | Mod: CPTII,S$GLB,, | Performed by: INTERNAL MEDICINE

## 2024-08-28 PROCEDURE — 1101F PT FALLS ASSESS-DOCD LE1/YR: CPT | Mod: CPTII,S$GLB,, | Performed by: INTERNAL MEDICINE

## 2024-08-28 PROCEDURE — 82962 GLUCOSE BLOOD TEST: CPT | Performed by: ANESTHESIOLOGY

## 2024-08-28 PROCEDURE — 3008F BODY MASS INDEX DOCD: CPT | Mod: CPTII,S$GLB,, | Performed by: INTERNAL MEDICINE

## 2024-08-28 PROCEDURE — 3044F HG A1C LEVEL LT 7.0%: CPT | Mod: CPTII,S$GLB,, | Performed by: INTERNAL MEDICINE

## 2024-08-28 PROCEDURE — 3079F DIAST BP 80-89 MM HG: CPT | Mod: CPTII,S$GLB,, | Performed by: INTERNAL MEDICINE

## 2024-08-28 PROCEDURE — 3074F SYST BP LT 130 MM HG: CPT | Mod: CPTII,S$GLB,, | Performed by: INTERNAL MEDICINE

## 2024-08-28 PROCEDURE — 3288F FALL RISK ASSESSMENT DOCD: CPT | Mod: CPTII,S$GLB,, | Performed by: INTERNAL MEDICINE

## 2024-08-28 PROCEDURE — 99999 PR PBB SHADOW E&M-EST. PATIENT-LVL V: CPT | Mod: PBBFAC,,, | Performed by: INTERNAL MEDICINE

## 2024-08-28 PROCEDURE — 99213 OFFICE O/P EST LOW 20 MIN: CPT | Mod: S$GLB,,, | Performed by: INTERNAL MEDICINE

## 2024-08-28 PROCEDURE — 1160F RVW MEDS BY RX/DR IN RCRD: CPT | Mod: CPTII,S$GLB,, | Performed by: INTERNAL MEDICINE

## 2024-08-28 NOTE — PLAN OF CARE
Pt instructed to take his BP first before taking his blood pressure pill at home. Pt instructed to go to the nearest Ed if he starts to feel weak, chest pain, more dizzy.

## 2024-08-28 NOTE — PLAN OF CARE
Pt reports dizziness and headache. Pt not sure what procedure he is having. When asked where is he hurting, pt stated his shoulder. RN informed pt that he is having L shoulder injection today. Pt stated that he did already the L shoulder.

## 2024-08-28 NOTE — DISCHARGE INSTRUCTIONS
Side effects may include: headache, restlessness at night, weakness to upper or lower extremities (arms or legs), flushing of the face and/ or neck. None are life threatening and will subside within 2-3 days.   If you have SEVERE headache with nausea and extreme pain, please call office ([phone]). Unless you usually have this type of migraine headache.   If you develop fever (greater than 101 F) or have any redness, swelling, or drainage at the injection site(s), please call off ([phone]). This may be a sign of infection.   If a rash or whelps (like hives) occur, please call the office ([phone]).  If you are a heart patient, please watch for symptoms such as swelling in your hands and feet and shortness of breath. If any of these symptoms occur, please notify your primary care/ heart doctor and go to the nearest emergency room.  If you have high blood pressure, you may experience an increase in your blood pressure over the next two weeks. Please continue to monitor your blood pressure and contact your primary care provider if your blood pressure does not return to baseline.    If you are a diabetic or you monitor your blood sugar, you may have an increase in your blood sugar over the next two weeks. If your blood sugar does not return to your baseline, please contact your primary care provider.  NO HEAT TO THE INJECTION SITE for the next 24 hours including: bath or shower, heating pad, moist heat, and / or hot tubs.   May use ice pack to injection site for any pain or discomfort. Apply ice pack for 20 minutes then remove for 20 minutes before re- applying to site. (recipe for homemade frozen gel pack below)  DO NOT DRIVE OR OPERATE HEAVY MACHINERY UNTIL TOMORROW MORNING.   OK to resume all medications unless otherwise directed by your doctor.  Injection(s) may take up to two weeks until full effects are noted.  You may return to normal activity/ work the following day.  Please do not receive a flu  or pneumonia vaccine until one week after your procedure.  .  Homemade Frozen Gel Ice Pack:  1 bottle of rubbing alcohol  3 bottles of water (use rubbing alcohol bottle to measure)  2 large zip lock bags    Instructions:  Pour alcohol and water into zip lock bag. Make sure bag is large enough to allow for expansion.  Try to get as much air out of the freezer bag before sealing it shut.   Place the bag and its contents inside a second freezer bag to contain any leakage.   Leave the bag in the freezer for at least one hour.  When it's ready, place a towel between the gel pack and bare skin to avoid burning the skin.
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
## 2024-08-30 ENCOUNTER — TELEPHONE (OUTPATIENT)
Dept: INTERNAL MEDICINE | Facility: CLINIC | Age: 69
End: 2024-08-30
Payer: MEDICARE

## 2024-08-30 NOTE — TELEPHONE ENCOUNTER
Dr Polanco the patient need a prescription for his power wheelchair repairs.    Crow Jamallion    1955      The patient is still using his power wheel chair, and still medically necessary for MRADL's in his home. Please make necessary repairs.     Diagnosis codes:

## 2024-09-03 ENCOUNTER — PATIENT MESSAGE (OUTPATIENT)
Dept: CARDIOLOGY | Facility: CLINIC | Age: 69
End: 2024-09-03
Payer: MEDICARE

## 2024-09-06 ENCOUNTER — PATIENT MESSAGE (OUTPATIENT)
Dept: PSYCHIATRY | Facility: CLINIC | Age: 69
End: 2024-09-06
Payer: MEDICARE

## 2024-09-10 ENCOUNTER — PATIENT MESSAGE (OUTPATIENT)
Dept: OPHTHALMOLOGY | Facility: CLINIC | Age: 69
End: 2024-09-10
Payer: MEDICARE

## 2024-09-10 ENCOUNTER — TELEPHONE (OUTPATIENT)
Dept: OPHTHALMOLOGY | Facility: CLINIC | Age: 69
End: 2024-09-10
Payer: MEDICARE

## 2024-09-10 NOTE — TELEPHONE ENCOUNTER
attempted to contact pt, number left on chart is not working. called secondary number, incorrect number// unable to LVM// called 9/10/24 at 2:08pm    also sent message via [facility]

## 2024-09-12 ENCOUNTER — TELEPHONE (OUTPATIENT)
Dept: PSYCHIATRY | Facility: CLINIC | Age: 69
End: 2024-09-12
Payer: MEDICARE

## 2024-09-12 ENCOUNTER — TELEPHONE (OUTPATIENT)
Dept: INTERNAL MEDICINE | Facility: CLINIC | Age: 69
End: 2024-09-12
Payer: MEDICARE

## 2024-09-12 NOTE — TELEPHONE ENCOUNTER
----- Message from Cesar Matthews MA sent at 9/12/2024 11:33 AM CDT -----  The patient calling to speak with staff about home health nurse not coming out. Please give him a call back at [phone].

## 2024-09-12 NOTE — TELEPHONE ENCOUNTER
Pt call was returned and he was informed that the psychiatry will be contacting him to scheduled his appointment.

## 2024-09-12 NOTE — TELEPHONE ENCOUNTER
----- Message from Cesar Matthews MA sent at 9/12/2024 11:38 AM CDT -----  The patient calling to get scheduled from referral. Please give him a call back at [phone].

## 2024-09-18 PROCEDURE — G0180 MD CERTIFICATION HHA PATIENT: HCPCS | Mod: ,,, | Performed by: FAMILY MEDICINE

## 2024-09-18 NOTE — TELEPHONE ENCOUNTER
Refill Routing Note   Medication(s) are not appropriate for processing by [facility] for the following reason(s):        Non-participating provider    [facility] action(s):  Route               Appointments  past 12m or future 3m with PCP    Date Provider   Last Visit   7/18/2024 Eunice Sebastian NP   Next Visit   Visit date not found Eunice Sebastian NP   ED visits in past 90 days: 0        Note composed:8:05 PM 09/17/2024

## 2024-09-18 NOTE — TELEPHONE ENCOUNTER
----- Message from Esther Cantu sent at 9/18/2024  2:46 PM CDT -----  Name of Who is Calling:SHAGGY JASON [[id number]]        What is the request in detail: Pt needs a referral slip to change out his oxygen tanks all 4 of them empty please advise thank you         [facility]        What Number to Call Back if not in [facility]: Telephone Information:  Mobile      [phone]

## 2024-09-19 RX ORDER — ALBUTEROL SULFATE 90 UG/1
INHALANT RESPIRATORY (INHALATION)
Qty: 8.5 G | Refills: 1 | Status: SHIPPED | OUTPATIENT
Start: 2024-09-19

## 2024-09-26 ENCOUNTER — TELEPHONE (OUTPATIENT)
Dept: INTERNAL MEDICINE | Facility: CLINIC | Age: 69
End: 2024-09-26
Payer: MEDICARE

## 2024-09-26 NOTE — TELEPHONE ENCOUNTER
----- Message from Megan Dean sent at 9/26/2024 11:07 AM CDT -----  Contact: Crow  .Patient is calling to speak with the nurse regarding orders . Reports needing referral for therapy . Please give patient a call back at   [phone].
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
## 2024-09-29 DIAGNOSIS — M54.9 BACK PAIN, UNSPECIFIED BACK LOCATION, UNSPECIFIED BACK PAIN LATERALITY, UNSPECIFIED CHRONICITY: Primary | ICD-10-CM

## 2024-09-30 ENCOUNTER — TELEPHONE (OUTPATIENT)
Dept: INTERNAL MEDICINE | Facility: CLINIC | Age: 69
End: 2024-09-30
Payer: MEDICARE

## 2024-09-30 ENCOUNTER — TELEPHONE (OUTPATIENT)
Dept: NEUROLOGY | Facility: CLINIC | Age: 69
End: 2024-09-30
Payer: MEDICARE

## 2024-09-30 NOTE — TELEPHONE ENCOUNTER
Tried calling the patient to inform him of the PT referral was placed in the system from Dr Polanco. No answer and unable to leave a message.
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
## 2024-09-30 NOTE — TELEPHONE ENCOUNTER
Called pt to reschedule appt. Person who answered phone said that he is still sleeping and will call back.

## 2024-10-01 ENCOUNTER — TELEPHONE (OUTPATIENT)
Dept: NEUROLOGY | Facility: CLINIC | Age: 69
End: 2024-10-01
Payer: MEDICARE

## 2024-10-01 NOTE — TELEPHONE ENCOUNTER
Called pt to reschedule due to provider being out of office day of appt. Pt verbalized understanding.

## 2024-10-02 ENCOUNTER — OFFICE VISIT (OUTPATIENT)
Dept: INTERNAL MEDICINE | Facility: CLINIC | Age: 69
End: 2024-10-02
Payer: MEDICARE

## 2024-10-02 VITALS
OXYGEN SATURATION: 97 % | BODY MASS INDEX: 30.52 KG/M2 | HEART RATE: 84 BPM | TEMPERATURE: 97 F | DIASTOLIC BLOOD PRESSURE: 78 MMHG | HEIGHT: 65 IN | SYSTOLIC BLOOD PRESSURE: 136 MMHG | WEIGHT: 183.19 LBS

## 2024-10-02 DIAGNOSIS — M19.011 OSTEOARTHRITIS OF GLENOHUMERAL JOINT, RIGHT: Primary | ICD-10-CM

## 2024-10-02 DIAGNOSIS — I65.21 CAROTID STENOSIS, ASYMPTOMATIC, RIGHT: ICD-10-CM
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
## 2024-10-02 DIAGNOSIS — M47.26 OSTEOARTHRITIS OF SPINE WITH RADICULOPATHY, LUMBAR REGION: ICD-10-CM

## 2024-10-02 PROCEDURE — 99999 PR PBB SHADOW E&M-EST. PATIENT-LVL V: CPT | Mod: PBBFAC,,,

## 2024-10-02 PROCEDURE — 3008F BODY MASS INDEX DOCD: CPT | Mod: CPTII,S$GLB,,

## 2024-10-02 PROCEDURE — 3288F FALL RISK ASSESSMENT DOCD: CPT | Mod: CPTII,S$GLB,,

## 2024-10-02 PROCEDURE — 3075F SYST BP GE 130 - 139MM HG: CPT | Mod: CPTII,S$GLB,,

## 2024-10-02 PROCEDURE — 1160F RVW MEDS BY RX/DR IN RCRD: CPT | Mod: CPTII,S$GLB,,

## 2024-10-02 PROCEDURE — 99212 OFFICE O/P EST SF 10 MIN: CPT | Mod: S$GLB,,,

## 2024-10-02 PROCEDURE — 3044F HG A1C LEVEL LT 7.0%: CPT | Mod: CPTII,S$GLB,,

## 2024-10-02 PROCEDURE — G2211 COMPLEX E/M VISIT ADD ON: HCPCS | Mod: S$GLB,,,

## 2024-10-02 PROCEDURE — 3078F DIAST BP <80 MM HG: CPT | Mod: CPTII,S$GLB,,

## 2024-10-02 PROCEDURE — 1159F MED LIST DOCD IN RCRD: CPT | Mod: CPTII,S$GLB,,

## 2024-10-02 PROCEDURE — 1101F PT FALLS ASSESS-DOCD LE1/YR: CPT | Mod: CPTII,S$GLB,,

## 2024-10-02 PROCEDURE — 1125F AMNT PAIN NOTED PAIN PRSNT: CPT | Mod: CPTII,S$GLB,,

## 2024-10-02 PROCEDURE — 4010F ACE/ARB THERAPY RXD/TAKEN: CPT | Mod: CPTII,S$GLB,,
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
## 2024-10-09 ENCOUNTER — EXTERNAL HOME HEALTH (OUTPATIENT)
Dept: HOME HEALTH SERVICES | Facility: HOSPITAL | Age: 69
End: 2024-10-09
Payer: MEDICARE

## 2024-10-10 ENCOUNTER — TELEPHONE (OUTPATIENT)
Dept: NEUROLOGY | Facility: CLINIC | Age: 69
End: 2024-10-10
Payer: MEDICARE

## 2024-10-10 NOTE — PROGRESS NOTES
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    [id number] 239 03/03/2022       Lab Results   Component Value Date    TSH 0.881 05/23/2023       No results found in the last 24 hours.    No results found in the last 24 hours.    Reviewed the neuroimaging independently       Assessment:   69 Years old Male with PMH as above came for an evaluation of "Headaches"    Intractable chronic migraine with aura and without status migrainosus     Nausea     Tinnitus of both ears     Dizziness and giddiness     Neck tightness     Balance problem     Blurry vision, bilateral     Non compliance w medication regimen     SANDRITA (obstructive sleep apnea)     Elevated sed rate     Elevated C-reactive protein (CRP)     Type 2 diabetes mellitus with stage 3a chronic kidney disease, with long-term " current use of insulin     Carotid stenosis, right     Poor short term memory       Plan:   The neurological assessment of the patient reveals bilateral lower extremity weakness, balance and coordination difficulties, and tenderness upon palpation in the left temporal and parietal regions, as well as bilateral neck tenderness.      Intractable chronic migraine with aura and without status migrainosus     -Start Topamax 25 mg PO BID for Headache Prevention Therapy. No HX of Kidney Stones. Side effects discussed (mental slowing, transient tingling, kidney stones, weight loss, cleft lip and palate and rarely glaucoma and visual field defects). The patient was encouraged to drink a lot of fluids. Patient verbalized understanding.     -Order Brain MRI WO Contrast to to rule out structual abnormalities contributing to symptoms and rule out CVA.     -Order PT for Migraine Therapy / Neck Tightness / Vestibular Therapy / Bilateral leg weakness /  Balance issues /     -Order RPR / HIV / AMOL / B-12 / FA /     -Continue with obtaining TSH as previously ordered with PCP    Nausea     Patient declines pharmacological therapy.    Tinnitus of both ears / Dizziness and giddiness     -Order ENT referral for further evbaluation and recommendation of bilateral tinnitus / dizziness     Neck tightness / Balance problem     -Order PT for Migraine Therapy / Neck Tightness / Vestibular Therapy / Bilateral leg weakness /  Balance issues /     Blurry vision, bilateral     -Continue Ophthalmology Evaluation for vision abnormalities, especially being on Topamax.     Non compliance w medication regimen     -The patient reports previously receiving home health services for medication assistance, which were discontinued. He indicates a need for another referral, as he has not taken his medications in the past week. The patient mentioned that the home health nurse had been organizing his medications weekly. Services will be reordered to provide the  necessary support. Patient instructed on importance of medication compliance. He verbalized understanding.     SANDRITA (obstructive sleep apnea)     -Order Referral to Sleep Disorders for management of SANDRITA and mask evaluation. Patient instructed on the importance of compliance with CPAP Machine for SANDRITA. He verbalized understanding.      Elevated sed rate / Elevated C-reactive protein (CRP)     -Order Referral to Rheumatologist for elevated ESR / CRP    Type 2 diabetes mellitus with stage 3a chronic kidney disease, with long-term current use of insulin     -DM- A1C elevated (5.7). Patient is instructed on lifestyle modifications such as heart healthy diet, limiting excess sugar, limiting excess fried and fatty foods, weight management, eating lean meats and vegetables, staying hydrated, avoiding alcohol and smoking, and exercising at least 30 minutes per day. Patient instructed to continue taking medications as prescribed and to continue to follow PCP for management. Patient verbalizes understanding.      Carotid stenosis, right     -Order Referral to Vascular Surgery for evaluation of Severe approximately 80-85% stenosis of the proximal right cervical internal carotid artery. Severe stenosis of the right V1 segment. Patient unsure if this was addressed.    Poor short term memory     -Will plan to address short-term memory concerns at next visit.               LABORATORY EVALUATION    Labs: (2024) Lactic Acid / CBC / CMP / Aldolase / PSA / Rheumatoid Factor      -personally reviewed -non-significant abnormalities except     -Creatinine (1.37) - Followed by PCP    -Toxicology Screen: Cocaine / Opiates - Followed by PCP    -A1C (5.7) - Followed by PCP - Patient taking Ozempic / Jardiance / NOVALOG     ESR (92)  / CRP (9.6) - elevated - Orderd Referral to Rheumatology      RADIOLOGY EVALUATION     Personally Reviewed CTA Neck - done 2024 - report only - No evidence of acute vascular injury within the neck. Severe  approximately 80-85% stenosis of the proximal right cervical internal carotid artery.   Severe stenosis of the right V1 segment. - Sent referral to Vascular Surgery - Patient unsure if this was addressed.     Personally Reviewed Head CT WO Contrast - done 07/2024  - report only - No acute intracranial abnormality. Findings of chronic small vessel ischemic disease.     Personally Reviewed CT Thoracic Spine WO Contrast -  done 07/2024  - report only - No acute fracture or traumatic malalignment.     Personally Reviewed CT Lumbar Spine WO Contrast -  done 07/2024  - report only - No acute fracture or traumatic malalignment. Degenerative changes as above.       NEUROPHYSIOLOGY EVALUATION       PATHOLOGY EVALUATION        NEUROCOGNITIVE AND NEUROPSYCHOLOGY EVALUATION                         MIGRAINE, COMMON, WITH AURA, EPISODIC, HIGH FREQUENCY       MANAGEMENT       HEADACHE DIARY     DISCUSSED THE THREE-FOLD MANAGEMENT OF MIGRAINE:      LIFESTYLE CHANGES:       Good sleep hygiene  Avoid general triggers like lack of sleep/too much sleep, prolonged sun exposure, excessive screen time and specific triggers based on you own diary   Minimize physical and emotional stress  Smoking avoidance and cessation  Limit caffeine drinks to 1-2 a day   Good hydration   Small frequent meals and avoid skipping meals   Moderate 30-minute-long aerobic exercises 3 times/week. Avoid strenuous exercise         ABORTIVE MEDICATIONS (ACUTE-RESCUE MEDICATIONS):     Should only be taken 2-3 times/week to avoid rebound and overuse headaches.    I-explained to the patient that pain meds especially triptans should NOT be taken daily to avoid Rebound Headache and Overuse Headache.    Take at the ONSET of the headache sumatriptan 100 mg PO  (or other Triptan) in combination with naproxen 500 mg PO or Ibuprofen 800 mg PO for headache without nausea or vomiting.  This regimen can be repeated only once in 24 hours after 2 hours.    Side effects of  triptans were discussed and include rare cardiac and cerebral ischemia and cannot be used with migraine associate with focal neurological deficits (complicated migraine) in addition to drowsiness and potential impairment of driving ability. The patient verbalized understanding.    NSAIDs can cause peptic ulcers, renal insufficiency and may increase the risk of cardiovascular diseases.  SEs were discussed with the patient. The patient verbalized understanding.    Triptans have shown to be more effective than Gepatns with more SE/AE.      AVOID NARCOTICS (OPIATES)      1. No randomized controlled study shows pain-free results with opioids in the treatment of migraine.     2. The physiologic consequences of opioid use are adverse, occur quickly, and can be permanent. Decreased gray matter, release of calcitonin gene-related peptide, dynorphin, and pro-inflammatory peptides, and activation of excitatory glutamate receptors are all associated with opioid exposure.     3. Opioids are pro-nociceptive, prevent reversal of migraine central sensitization, and interfere with triptan effectiveness.     4.Opioids precipitate bad clinical outcomes, especially transformation to daily headache.     5. They cause disease progression, comorbidity, and excessive health care consumption.           NEXT OPTIONS:    Triptans: Sumatriptan (Imitrex), Rizatriptan (Maxalt).    Gepants: Nuretc (rimegepant)75 mg >Ubrelvy (ubrogepant) 100 mg    Ditans: Reyvow (lasmiditan) 100 mg (No driving due to sedation)    Fioricet without codeine with Reglan.      Prednisone with Reglan.      LAST RESORT:     DHE NS Trudhesa (Max 2 a week)     C/I: concomitant use of vasoconstrictors like Triptans, strong CY inhibitors such as HAART PIs (eg, ritonavir, nelfinavir, or indinavir) and Macrolides (eg, erythromycin or clarithromycin), CAD, PVD, Stroke/TIA and Uncontrolled HTN.  Serious SEs include Vasospasm and Fibrosis (chronic use).       IMPENDING  STATUS: Prednisone and Vistaril.    STATUS MIGRAINOSUS: ED-Infusion for Status Protocol.        PREVENTATIVE (MORE ACCURATELY MIGRAINE REDUCTION) MEDICATIONS:           Since the patient's headache is very frequent a lengthy discussion about preventative medications was carried out.The patient understands that prevention means DECREASING frequency and severity and NOT elimination.The patient was made aware that any new medication can cause serious allergic reaction.The medication is considered failure only if a therapeutic dose reached and maintained for 6-8 weeks.        HELPFUL SUPPLEMENTS:     Helpful supplements include Co-Q 10, B2, Mg, Feverfew (Dolovent combination) and butterbur (Petadolex)        NEUROPHARMACOLOGY     NEXT OPTIONS:     Zonisamide/Zonegran (ZNS) 100-400 mg QHS is a good alternative to TPM in case of SE/AE.     Amitriptyline/Elavil (TCA) slow titration to 100-Age which can cause sleepiness, dry eyes, dry mouth, urinary retention, and rarely cardiac arrhythmias    Propranolol/Inderal  (BB)slow titration to 80 mg BID which can cause low blood pressure, slow heart rate, erectile dysfunction, depression, airway obstruction and heart failure exacerbations. Cannot be used with migraine associate with focal neurological deficits.    Lamotrigine/Lamictal  (LTG)slow titration to 100 mg BID which can cause serious skin rash and rare cardiac arrhythmias. LTG is superior to other therapies for specifically reducing migraine aura.     ANTI-CGRP AGENTS: Qulipta (alogepant) 60 mg QD, Erenumab (Aimovig) 140 mg SQ Pen monthly (Reported cases of Constipation and BP elevation) , Galcanezumab (Emgality) 120 mg SQ Pen monthly after a loading dose of 240 mg  and Fremnezumab (Ajovy) (Ligand Blocker): 225 mg SQ monthly or 675 mg every 3 months     Botox 200 units every 3 months.         LAST RESORT OPTIONS:      Namenda 10 mg BID     Valproic acid/ Depakote         NEUROMODULATION     Cefaly, Relivion, Nerivio and  GammaCore (VNS)           SCHOOL AND WORK ACCOMMODATIONS        Allow the patient to wear sunglasses or a cap and switch out fluorescent bulbs.    Allow the patient to arrive 5 minutes later and leave 5 minutes earlier to avoid noisy traffic.    Allow the patient to carry a water bottle and refill as needed.    Allow the patient to snack whenever is needed.    Allow the patient to decrease the computer brightness.    Allow the patient to take breaks as needed and extra time for assignments and deadlines.    Allow the patient to avoid strenuous activity as needed.         MEDICAL/SURGICAL COMORBIDITIES     All relevant medical comorbidities noted and managed by primary care physician and medical care team.          HEALTHY LIFESTYLE AND PREVENTATIVE CARE    The patient to adhere to the age-appropriate health maintenance guidelines including screening tests and vaccinations. The patient to adhere to  healthy lifestyle, optimal weight, exercise, healthy diet, good sleep hygiene and avoiding drugs including smoking, alcohol and recreational drugs.    I spent a total of 85 minutes on the day of the visit.This includes face to face time and non-face to face time preparing to see the patient (eg, review of tests), obtaining and/or reviewing separately obtained history, documenting clinical information in the electronic or other health record, independently interpreting results and communicating results to the patient/family/caregiver, or care coordinator.     Please do not hesitate to contact me with any updates, questions or concerns.    No follow-ups on file.    Rasheed Abreu, MSN, FNP-C    General Neurology

## 2024-10-11 ENCOUNTER — PATIENT MESSAGE (OUTPATIENT)
Dept: OTOLARYNGOLOGY | Facility: CLINIC | Age: 69
End: 2024-10-11
Payer: MEDICARE

## 2024-10-11 ENCOUNTER — OFFICE VISIT (OUTPATIENT)
Dept: PODIATRY | Facility: CLINIC | Age: 69
End: 2024-10-11
Payer: MEDICARE

## 2024-10-11 ENCOUNTER — OFFICE VISIT (OUTPATIENT)
Dept: NEUROLOGY | Facility: CLINIC | Age: 69
End: 2024-10-11
Payer: MEDICARE

## 2024-10-11 ENCOUNTER — LAB VISIT (OUTPATIENT)
Dept: LAB | Facility: HOSPITAL | Age: 69
End: 2024-10-11
Payer: MEDICARE

## 2024-10-11 VITALS — BODY MASS INDEX: 30.52 KG/M2 | HEIGHT: 65 IN | WEIGHT: 183.19 LBS

## 2024-10-11 VITALS
OXYGEN SATURATION: 99 % | BODY MASS INDEX: 30.78 KG/M2 | WEIGHT: 184.75 LBS | DIASTOLIC BLOOD PRESSURE: 93 MMHG | SYSTOLIC BLOOD PRESSURE: 161 MMHG | HEIGHT: 65 IN | RESPIRATION RATE: 16 BRPM | HEART RATE: 75 BPM

## 2024-10-11 DIAGNOSIS — Z79.01 ON ANTICOAGULANT THERAPY: ICD-10-CM

## 2024-10-11 DIAGNOSIS — E11.49 TYPE II DIABETES MELLITUS WITH NEUROLOGICAL MANIFESTATIONS: Primary | ICD-10-CM

## 2024-10-11 DIAGNOSIS — G47.33 OSA (OBSTRUCTIVE SLEEP APNEA): ICD-10-CM

## 2024-10-11 DIAGNOSIS — L84 PRE-ULCERATIVE CALLUSES: ICD-10-CM

## 2024-10-11 DIAGNOSIS — G43.E19 INTRACTABLE CHRONIC MIGRAINE WITH AURA AND WITHOUT STATUS MIGRAINOSUS: ICD-10-CM

## 2024-10-11 DIAGNOSIS — N18.31 TYPE 2 DIABETES MELLITUS WITH STAGE 3A CHRONIC KIDNEY DISEASE, WITH LONG-TERM CURRENT USE OF INSULIN: ICD-10-CM

## 2024-10-11 DIAGNOSIS — H53.8 BLURRY VISION, BILATERAL: ICD-10-CM

## 2024-10-11 DIAGNOSIS — E53.8 VITAMIN B12 DEFICIENCY: ICD-10-CM

## 2024-10-11 DIAGNOSIS — Z01.818 PREOP EXAMINATION: ICD-10-CM

## 2024-10-11 DIAGNOSIS — R29.898 NECK TIGHTNESS: ICD-10-CM

## 2024-10-11 DIAGNOSIS — B35.1 DERMATOPHYTOSIS OF NAIL: ICD-10-CM

## 2024-10-11 DIAGNOSIS — H93.13 TINNITUS OF BOTH EARS: ICD-10-CM

## 2024-10-11 DIAGNOSIS — R70.0 ELEVATED SED RATE: ICD-10-CM

## 2024-10-11 DIAGNOSIS — E53.8 FOLATE DEFICIENCY: ICD-10-CM

## 2024-10-11 DIAGNOSIS — R41.3 POOR SHORT TERM MEMORY: ICD-10-CM

## 2024-10-11 DIAGNOSIS — R42 DIZZINESS AND GIDDINESS: ICD-10-CM

## 2024-10-11 DIAGNOSIS — G44.52 NEW DAILY PERSISTENT HEADACHE: ICD-10-CM

## 2024-10-11 DIAGNOSIS — R11.0 NAUSEA: ICD-10-CM

## 2024-10-11 DIAGNOSIS — Z91.148 NON COMPLIANCE W MEDICATION REGIMEN: ICD-10-CM

## 2024-10-11 DIAGNOSIS — Z79.4 TYPE 2 DIABETES MELLITUS WITH STAGE 3A CHRONIC KIDNEY DISEASE, WITH LONG-TERM CURRENT USE OF INSULIN: ICD-10-CM

## 2024-10-11 DIAGNOSIS — R51.9 CHRONIC DAILY HEADACHE: ICD-10-CM

## 2024-10-11 DIAGNOSIS — R79.82 ELEVATED C-REACTIVE PROTEIN (CRP): ICD-10-CM

## 2024-10-11 DIAGNOSIS — I65.21 CAROTID STENOSIS, RIGHT: ICD-10-CM

## 2024-10-11 DIAGNOSIS — R26.89 BALANCE PROBLEM: ICD-10-CM

## 2024-10-11 DIAGNOSIS — I48.0 PAF (PAROXYSMAL ATRIAL FIBRILLATION): Chronic | ICD-10-CM

## 2024-10-11 DIAGNOSIS — E11.22 TYPE 2 DIABETES MELLITUS WITH STAGE 3A CHRONIC KIDNEY DISEASE, WITH LONG-TERM CURRENT USE OF INSULIN: ICD-10-CM

## 2024-10-11 DIAGNOSIS — G43.E19 INTRACTABLE CHRONIC MIGRAINE WITH AURA AND WITHOUT STATUS MIGRAINOSUS: Primary | ICD-10-CM

## 2024-10-11 LAB
ALBUMIN SERPL BCP-MCNC: 3.5 G/DL (ref 3.5–5.2)
ALP SERPL-CCNC: 85 U/L (ref 55–135)
ALT SERPL W/O P-5'-P-CCNC: 17 U/L (ref 10–44)
ANION GAP SERPL CALC-SCNC: 12 MMOL/L (ref 8–16)
APTT PPP: 26.3 SEC (ref 21–32)
AST SERPL-CCNC: 20 U/L (ref 10–40)
BASOPHILS # BLD AUTO: 0.01 K/UL (ref 0–0.2)
BASOPHILS NFR BLD: 0.1 % (ref 0–1.9)
BILIRUB SERPL-MCNC: 0.3 MG/DL (ref 0.1–1)
BUN SERPL-MCNC: 12 MG/DL (ref 8–23)
CALCIUM SERPL-MCNC: 9.8 MG/DL (ref 8.7–10.5)
CHLORIDE SERPL-SCNC: 104 MMOL/L (ref 95–110)
CO2 SERPL-SCNC: 26 MMOL/L (ref 23–29)
CREAT SERPL-MCNC: 1.1 MG/DL (ref 0.5–1.4)
DIFFERENTIAL METHOD BLD: ABNORMAL
EOSINOPHIL # BLD AUTO: 0.1 K/UL (ref 0–0.5)
EOSINOPHIL NFR BLD: 0.6 % (ref 0–8)
ERYTHROCYTE [DISTWIDTH] IN BLOOD BY AUTOMATED COUNT: 14.9 % (ref 11.5–14.5)
EST. GFR  (NO RACE VARIABLE): >60 ML/MIN/1.73 M^2
GLUCOSE SERPL-MCNC: 125 MG/DL (ref 70–110)
HCT VFR BLD AUTO: 46.4 % (ref 40–54)
HGB BLD-MCNC: 14.4 G/DL (ref 14–18)
IMM GRANULOCYTES # BLD AUTO: 0.01 K/UL (ref 0–0.04)
IMM GRANULOCYTES NFR BLD AUTO: 0.1 % (ref 0–0.5)
INR PPP: 1 (ref 0.8–1.2)
LYMPHOCYTES # BLD AUTO: 1.9 K/UL (ref 1–4.8)
LYMPHOCYTES NFR BLD: 25 % (ref 18–48)
MCH RBC QN AUTO: 28.2 PG (ref 27–31)
MCHC RBC AUTO-ENTMCNC: 31 G/DL (ref 32–36)
MCV RBC AUTO: 91 FL (ref 82–98)
MONOCYTES # BLD AUTO: 0.5 K/UL (ref 0.3–1)
MONOCYTES NFR BLD: 6.1 % (ref 4–15)
NEUTROPHILS # BLD AUTO: 5.3 K/UL (ref 1.8–7.7)
NEUTROPHILS NFR BLD: 68.1 % (ref 38–73)
NRBC BLD-RTO: 0 /100 WBC
PLATELET # BLD AUTO: 190 K/UL (ref 150–450)
PMV BLD AUTO: 12 FL (ref 9.2–12.9)
POTASSIUM SERPL-SCNC: 3.4 MMOL/L (ref 3.5–5.1)
PROT SERPL-MCNC: 8.2 G/DL (ref 6–8.4)
PROTHROMBIN TIME: 10.4 SEC (ref 9–12.5)
RBC # BLD AUTO: 5.11 M/UL (ref 4.6–6.2)
SODIUM SERPL-SCNC: 142 MMOL/L (ref 136–145)
TREPONEMA PALLIDUM IGG+IGM AB [PRESENCE] IN SERUM OR PLASMA BY IMMUNOASSAY: NONREACTIVE
WBC # BLD AUTO: 7.76 K/UL (ref 3.9–12.7)

## 2024-10-11 PROCEDURE — 82746 ASSAY OF FOLIC ACID SERUM: CPT

## 2024-10-11 PROCEDURE — 85025 COMPLETE CBC W/AUTO DIFF WBC: CPT | Performed by: PHYSICIAN ASSISTANT

## 2024-10-11 PROCEDURE — 11055 PARING/CUTG B9 HYPRKER LES 1: CPT | Mod: Q7,S$GLB,, | Performed by: PODIATRIST

## 2024-10-11 PROCEDURE — 80053 COMPREHEN METABOLIC PANEL: CPT | Performed by: PHYSICIAN ASSISTANT

## 2024-10-11 PROCEDURE — 99499 UNLISTED E&M SERVICE: CPT | Mod: S$GLB,,, | Performed by: PODIATRIST

## 2024-10-11 PROCEDURE — 86593 SYPHILIS TEST NON-TREP QUANT: CPT

## 2024-10-11 PROCEDURE — 82607 VITAMIN B-12: CPT

## 2024-10-11 PROCEDURE — 11721 DEBRIDE NAIL 6 OR MORE: CPT | Mod: Q7,59,S$GLB, | Performed by: PODIATRIST

## 2024-10-11 PROCEDURE — 86038 ANTINUCLEAR ANTIBODIES: CPT

## 2024-10-11 PROCEDURE — 87389 HIV-1 AG W/HIV-1&-2 AB AG IA: CPT

## 2024-10-11 PROCEDURE — 85730 THROMBOPLASTIN TIME PARTIAL: CPT | Performed by: PHYSICIAN ASSISTANT

## 2024-10-11 PROCEDURE — 99999 PR PBB SHADOW E&M-EST. PATIENT-LVL IV: CPT | Mod: PBBFAC,,, | Performed by: PODIATRIST

## 2024-10-11 PROCEDURE — 99999 PR PBB SHADOW E&M-EST. PATIENT-LVL V: CPT | Mod: PBBFAC,,,

## 2024-10-11 PROCEDURE — 36415 COLL VENOUS BLD VENIPUNCTURE: CPT | Performed by: PHYSICIAN ASSISTANT

## 2024-10-11 PROCEDURE — 85610 PROTHROMBIN TIME: CPT | Performed by: PHYSICIAN ASSISTANT

## 2024-10-11 RX ORDER — FUROSEMIDE 40 MG/1
40 TABLET ORAL 2 TIMES DAILY
COMMUNITY

## 2024-10-11 RX ORDER — TOPIRAMATE 25 MG/1
25 TABLET ORAL 2 TIMES DAILY
Qty: 60 TABLET | Refills: 0 | Status: SHIPPED | OUTPATIENT
Start: 2024-10-11 | End: 2024-11-10

## 2024-10-12 LAB
FOLATE SERPL-MCNC: 10.3 NG/ML (ref 4–24)
HIV 1+2 AB+HIV1 P24 AG SERPL QL IA: NORMAL
VIT B12 SERPL-MCNC: 623 PG/ML (ref 210–950)

## 2024-10-13 NOTE — PROGRESS NOTES
Subjective:     Patient ID: Crow Green is a 69 y.o. male.    Chief Complaint: Nail Care (Diabetic pt c/o BL foot pain, pt rates 10/10 pain, pt wears tennis shoes, PCP Cm Polanco last seen 8/15/2024) and Foot Pain      Crow is a 69 y.o. male who presents to the clinic for evaluation and treatment of high risk feet. Crow has a past medical history of Arthritis, Asthma, Atrial fibrillation, Atrial fibrillation with RVR (08/07/2019), Carotid artery stenosis, CHF (congestive heart failure), Chronic obstructive pulmonary disease (08/05/2020), Depression, Dermatomyositis, Diabetes mellitus, type 2 (Diagnosed in 2000), Elevated coronary artery calcium score (02/14/2024), Elevated PSA, Follow up (07/30/2020), Hypertension, Myocardial infarction, and Sleep apnea. The patient's chief complaint is long, thick toenails. This patient has documented high risk feet requiring routine maintenance secondary to diabetes mellitis and those secondary complications of diabetes, as mentioned.. Patient state she is running out of Gabapentin and his feet and hands are burning and tingling.     PCP: Cm Polanco MD    Date Last Seen by PCP: 08/15/2024    Current shoe gear:  Affected Foot: Rx diabetic extra depth shoes and custom accommodative insoles     Unaffected Foot: Rx diabetic extra depth shoes and custom accommodative insoles    Hemoglobin A1C   Date Value Ref Range Status   04/10/2024 5.7 (H) 4.0 - 5.6 % Final     Comment:     ADA Screening Guidelines:  5.7-6.4%  Consistent with prediabetes  >or=6.5%  Consistent with diabetes    High levels of fetal hemoglobin interfere with the HbA1C  assay. Heterozygous hemoglobin variants (HbS, HgC, etc)do  not significantly interfere with this assay.   However, presence of multiple variants may affect accuracy.     09/29/2023 6.4 (H) 4.0 - 5.6 % Final     Comment:     ADA Screening Guidelines:  5.7-6.4%  Consistent with prediabetes  >or=6.5%  Consistent with diabetes    High  levels of fetal hemoglobin interfere with the HbA1C  assay. Heterozygous hemoglobin variants (HbS, HgC, etc)do  not significantly interfere with this assay.   However, presence of multiple variants may affect accuracy.     05/23/2023 6.2 <=6.5 % Final   01/04/2023 6.3 (H) 4.0 - 5.6 % Final     Comment:     ADA Screening Guidelines:  5.7-6.4%  Consistent with prediabetes  >or=6.5%  Consistent with diabetes    High levels of fetal hemoglobin interfere with the HbA1C  assay. Heterozygous hemoglobin variants (HbS, HgC, etc)do  not significantly interfere with this assay.   However, presence of multiple variants may affect accuracy.         Patient Active Problem List   Diagnosis    ED (erectile dysfunction)    Non-proliferative diabetic retinopathy, mild, both eyes    Essential hypertension    Diabetic polyneuropathy    Nonobstructive coronary artery disease of native artery of native heart    Chronic combined systolic and diastolic heart failure    Type 2 diabetes mellitus with stage 3a chronic kidney disease, with long-term current use of insulin    SANDRITA (obstructive sleep apnea)    Allergic asthma    Psychophysiological insomnia    Nightmares    Sleep related gastroesophageal reflux disease    Inadequate sleep hygiene    Chest pain    PAF (paroxysmal atrial fibrillation)    At risk for medication noncompliance    Obesity (BMI 30.0-34.9)    Indeterminate stage chronic open angle glaucoma    Right hip pain    Gait instability    Chronic right shoulder pain    Arthritis    Left sided sciatica    Osteoarthritis of spine with radiculopathy, lumbar region    HNP (herniated nucleus pulposus), lumbar    Gastroesophageal reflux disease without esophagitis    Hypogonadism in male    Disorder of parathyroid gland    Hyperlipidemia associated with type 2 diabetes mellitus    Traumatic tear of left rotator cuff    Other chronic pain    Left shoulder pain    Complete tear of left rotator cuff    Moderate nonproliferative diabetic  retinopathy of left eye with macular edema associated with type 2 diabetes mellitus    Lumbar radiculopathy    Diabetic ulcer of toe of left foot associated with type 2 diabetes mellitus, with necrosis of muscle    Ulcer of left foot    Elevated troponin    Hypotension due to medication    Dermatomyositis    Postprocedural hypotension    Advanced directives, counseling/discussion    Schizoaffective disorder, depressive type    MDD (major depressive disorder), recurrent episode, moderate    DOMINIK (generalized anxiety disorder)    Bilateral carpal tunnel syndrome    Rotator cuff tear arthropathy of left shoulder    Sacroiliac dysfunction    Atherosclerosis of native coronary artery of native heart with unstable angina pectoris    Coronary vasospasm    Cocaine abuse    Osteopenia    Atherosclerosis of aorta    Cardiac asthma    Chronic gout of multiple sites    Acquired absence of left great toe    Hypoxia    Cardiomyopathy    Bilateral carotid artery stenosis    Substance abuse(UDS + for cocaine )    Immunocompromised patient    Other reduced mobility    Abnormality of gait and mobility    Dependence on supplemental oxygen    Positive depression screening    Pulmonary hypertension- echo 7/2022    NICM (nonischemic cardiomyopathy)    Elevated IgE level    Moderate persistent asthma without complication    Elevated coronary artery calcium score    Other chest pain       Medication List with Changes/Refills   New Medications    TOPIRAMATE (TOPAMAX) 25 MG TABLET    Take 1 tablet (25 mg total) by mouth 2 (two) times daily.   Current Medications    ALBUTEROL (PROVENTIL) 2.5 MG /3 ML (0.083 %) NEBULIZER SOLUTION    INHALE THE CONTENTS OF 1 VIAL VIA NEBULIZER EVERY 4 TO 6 HOURS AS NEEDED FOR WHEEZING OR FOR SHORTNESS OF BREATH    ALBUTEROL (PROVENTIL/VENTOLIN HFA) 90 MCG/ACTUATION INHALER    INHALE 2 PUFFS INTO LUNGS EVERY 4 HOURS AS NEEDED WHEEZING OT SHORTNESS OF BREATH    ALLOPURINOL (ZYLOPRIM) 100 MG TABLET    TAKE 1 TABLET  ONE TIME DAILY    AMLODIPINE (NORVASC) 2.5 MG TABLET    Take 1 tablet (2.5 mg total) by mouth once daily.    ATORVASTATIN (LIPITOR) 40 MG TABLET    Take 1 tablet (40 mg total) by mouth every evening.    BLOOD SUGAR DIAGNOSTIC (TRUE METRIX GLUCOSE TEST STRIP) STRP    Inject 1 each into the skin 4 (four) times daily.    BLOOD-GLUCOSE METER MISC    Use as instructed to test blood glucose meter daily.    BRINZOLAMIDE-BRIMONIDINE (SIMBRINZA) 1-0.2 % DRPS    Place 1 drop into both eyes 3 (three) times daily.    CYANOCOBALAMIN (VITAMIN B-12) 1000 MCG TABLET    Take 1 tablet by mouth once daily.    DEXCOM G7 SENSOR VIN    Change sensor every 10 days    ELIQUIS 5 MG TAB    TAKE 1 TABLET TWICE DAILY    EMPAGLIFLOZIN (JARDIANCE) 25 MG TABLET    Take 1 tablet (25 mg total) by mouth once daily.    EPINEPHRINE (EPIPEN) 0.3 MG/0.3 ML ATIN    Inject 0.3 mLs (0.3 mg total) into the muscle once. for 1 dose    FUROSEMIDE (LASIX) 40 MG TABLET    Take 40 mg by mouth 2 (two) times daily. 2.5 tablets twice daily    GABAPENTIN (NEURONTIN) 800 MG TABLET    Take 1 tablet (800 mg total) by mouth 3 (three) times daily.    INSULIN ASPART U-100 (NOVOLOG FLEXPEN U-100 INSULIN) 100 UNIT/ML (3 ML) INPN PEN    INJECT 10 UNITS UNDER THE SKIN THREE TIMES DAILY WITH MEALS    INV ALLOPURINOL TABLET    Take 1 tablet by mouth once daily.    LANCETS (TRUEPLUS LANCETS) 33 GAUGE MISC    Use to check blood sugar twice a day    LANCETS (TRUEPLUS LANCETS) 33 GAUGE MISC    USE AS DIRECTED TO TEST BLOOD SUGAR FOUR TIMES DAILY    LATANOPROST 0.005 % OPHTHALMIC SOLUTION    Place 1 drop into both eyes every evening.    LISINOPRIL (PRINIVIL,ZESTRIL) 30 MG TABLET    TAKE 1 TABLET EVERY DAY.    MULTIVITAMIN-IRON-FOLIC ACID (SENTRY) TAB    Take 1 tablet by mouth once daily.    OMALIZUMAB (XOLAIR) 150 MG/ML INJECTION    Inject 2 mLs (300 mg total) into the skin every 14 (fourteen) days.    OMALIZUMAB (XOLAIR) 75 MG/0.5 ML INJECTION    Inject 0.5 mLs (75 mg total) into  "the skin every 14 (fourteen) days.    PANTOPRAZOLE (PROTONIX) 40 MG TABLET    Take 1 tablet (40 mg total) by mouth once daily.    PEN NEEDLE, DIABETIC (BD ULTRA-FINE SHORT PEN NEEDLE) 31 GAUGE X 5/16" NDLE    1 each by Misc.(Non-Drug; Combo Route) route 3 (three) times daily.    PEP INJECTION    Inject 0.15 ml as directed     For compounding pharmacy use:   Add PAPAVERINE 30 mg  Add PHENTOLAMINE 1 mg  Add ALPROSTADIL 20 mcg    PREGABALIN (LYRICA) 100 MG CAPSULE    Take 1 capsule (100 mg total) by mouth 3 (three) times daily.    RANOLAZINE (RANEXA) 1,000 MG TB12    TAKE 1 TABLET TWICE DAILY    SEMAGLUTIDE (OZEMPIC) 2 MG/DOSE (8 MG/3 ML) PNIJ    Inject 2 mg into the skin every 7 days.    SERTRALINE (ZOLOFT) 100 MG TABLET    Take 1 tablet (100 mg total) by mouth every evening.    TRAZODONE (DESYREL) 100 MG TABLET    TAKE 2 TABLETS EVERY EVENING    VIOS AEROSOL DELIVERY SYSTEM VIN    USE AS DIRESTED       Review of patient's allergies indicates:   Allergen Reactions    Protamine Hives     Urticaria, possible upper airway swelling       Past Surgical History:   Procedure Laterality Date    ABLATION OF ARRHYTHMOGENIC FOCUS FOR ATRIAL FIBRILLATION N/A 2/27/2020    Procedure: Ablation atrial fibrillation;  Surgeon: Gio Brown MD;  Location: [facility] EP LAB;  Service: Cardiology;  Laterality: N/A;  afib, DAMIEN (cx if SR), PVI, PITO, anes, MB, 3 Prep    CHOLECYSTECTOMY      CLOSURE OF WOUND Left 7/1/2020    Procedure: CLOSURE, WOUND;  Surgeon: Barb Veras DPM;  Location: [facility] OR;  Service: Podiatry;  Laterality: Left;    COLONOSCOPY N/A 3/4/2022    Procedure: COLONOSCOPY;  Surgeon: Yolis Freitas MD;  Location: [facility] ENDO;  Service: Endoscopy;  Laterality: N/A;    ESOPHAGOGASTRODUODENOSCOPY N/A 3/4/2022    Procedure: EGD (ESOPHAGOGASTRODUODENOSCOPY);  Surgeon: Yolis Freitas MD;  Location: [facility] ENDO;  Service: Endoscopy;  Laterality: N/A;    INJECTION OF ANESTHETIC AGENT INTO SACROILIAC JOINT Left 3/24/2021 "    Procedure: Left BLOCK, SACROILIAC JOINT and bilateral rhomboid TPI;  Surgeon: Dillan Tsai MD;  Location: [facility] PAIN MGT;  Service: Pain Management;  Laterality: Left;    INTRALUMINAL GASTROINTESTINAL TRACT IMAGING VIA CAPSULE N/A 3/22/2022    Procedure: IMAGING PROCEDURE, GI TRACT, INTRALUMINAL, VIA CAPSULE;  Surgeon: Justice Martinez RN;  Location: [facility] ENDO;  Service: Endoscopy;  Laterality: N/A;    REVERSE TOTAL SHOULDER ARTHROPLASTY Left 1/10/2022    Procedure: ARTHROPLASTY, SHOULDER, TOTAL, REVERSE;  Surgeon: Anthony Alvarado MD;  Location: [facility] OR;  Service: Orthopedics;  Laterality: Left;  left reverse total shoulder arthroplasty     SELECTIVE INJECTION OF ANESTHETIC AGENT AROUND LUMBAR SPINAL NERVE ROOT BY TRANSFORAMINAL APPROACH Left 6/11/2020    Procedure: BLOCK, SPINAL NERVE ROOT, LUMBAR, SELECTIVE, TRANSFORAMINAL APPROACH Left L4-5, L5-S1 TFESI with RN IV sedation;  Surgeon: Dillan Tsai MD;  Location: [facility] PAIN MGT;  Service: Pain Management;  Laterality: Left;    TOE AMPUTATION Left 6/29/2020    Procedure: AMPUTATION, TOE;  Surgeon: Barb Veras DPM;  Location: [facility] OR;  Service: Podiatry;  Laterality: Left;  great toe       Family History   Problem Relation Name Age of Onset    Hypertension Mother      Glaucoma Mother      Cataracts Mother      Diabetes Mother      Cataracts Father      Stroke Father      Glaucoma Sister 4         x3    Cataracts Sister 4         x3    Diabetes Sister 4         x1    Stroke Sister 4         x1    No Known Problems Brother 2     No Known Problems Daughter 2     No Known Problems Son 3     Glaucoma Maternal Aunt      Diabetes Maternal Aunt      Cancer Maternal Grandfather          lung (smoker)    Prostate cancer Neg Hx      Heart disease Neg Hx      Kidney disease Neg Hx         Social History     Socioeconomic History    Marital status: Legally     Number of children: 5   Occupational History    Occupation: disabled    Occupation: PT at [place]  [employer]    Tobacco Use    Smoking status: Never    Smokeless tobacco: Never   Substance and Sexual Activity    Alcohol use: Yes     Comment: rare, maybe holidays    Drug use: Not Currently     Types: Cocaine    Sexual activity: Not Currently     Partners: Female   Social History Narrative    Utilizing [employer] transportation which has been unreliable.      Social Drivers of Health     Financial Resource Strain: Low Risk  (7/25/2023)    [facility]    Overall Financial Resource Strain (CARDIA)     Difficulty of Paying Living Expenses: Not hard at all   Food Insecurity: No Food Insecurity (7/25/2023)    [facility], [facility]    Hunger Vital Sign     Worried About Running Out of Food in the Last Year: Never true     Ran Out of Food in the Last Year: Never true   Transportation Needs: No Transportation Needs (7/25/2023)    [facility], [facility] Its Subs[facility] and Affiliates    PRAPARE - Transportation     Lack of Transportation (Medical): No     Lack of Transportation (Non-Medical): No   Physical Activity: Inactive (12/7/2022)    Exercise Vital Sign     Days of Exercise per Week: 0 days     Minutes of Exercise per Session: 0 min   Stress: No Stress Concern Present (7/25/2023)    [facility]    [place] [employer] - Occupational Stress Questionnaire      "Feeling of Stress : Not at all   Housing Stability: Unknown (7/25/2023)    Received from [facility] [place] [facility] and [facility], [facility] and Affiliates    Housing Stability Vital Sign     Unable to Pay for Housing in the Last Year: No       Vitals:    10/11/24 0910   Weight: 83.1 kg (183 lb 3.2 oz)   Height: 5' 5" (1.651 m)   PainSc: 10-Worst pain ever       Hemoglobin A1C   Date Value Ref Range Status   04/10/2024 5.7 (H) 4.0 - 5.6 % Final     Comment:     ADA Screening Guidelines:  5.7-6.4%  Consistent with prediabetes  >or=6.5%  Consistent with diabetes    High levels of fetal hemoglobin interfere with the HbA1C  assay. Heterozygous hemoglobin variants (HbS, HgC, etc)do  not significantly interfere with this assay.   However, presence of multiple variants may affect accuracy.     09/29/2023 6.4 (H) 4.0 - 5.6 % Final     Comment:     ADA Screening Guidelines:  5.7-6.4%  Consistent with prediabetes  >or=6.5%  Consistent with diabetes    High levels of fetal hemoglobin interfere with the HbA1C  assay. Heterozygous hemoglobin variants (HbS, HgC, etc)do  not significantly interfere with this assay.   However, presence of multiple variants may affect accuracy.     05/23/2023 6.2 <=6.5 % Final   01/04/2023 6.3 (H) 4.0 - 5.6 % Final     Comment:     ADA Screening Guidelines:  5.7-6.4%  Consistent with prediabetes  >or=6.5%  Consistent with diabetes    High levels of fetal hemoglobin interfere with the HbA1C  assay. Heterozygous hemoglobin variants (HbS, HgC, etc)do  not significantly interfere with this assay.   However, presence of multiple variants may affect accuracy.         Review of Systems   Constitutional:  Negative for chills and fever.   Respiratory:  Negative for shortness of breath.    Cardiovascular:  Negative for chest pain, palpitations, orthopnea, claudication and leg swelling.   Gastrointestinal: "  Negative for diarrhea, nausea and vomiting.   Musculoskeletal:  Negative for joint pain.   Skin:  Negative for rash.   Neurological:  Positive for tingling and sensory change.   Psychiatric/Behavioral: Negative.           Objective:      PHYSICAL EXAM: Apperance: Alert and orient in no distress,well developed, and with good attention to grooming and body habits  Patient presents ambulating in diabetic shoes and inserts.   LOWER EXTREMITY EXAM:  VASCULAR: Dorsalis pedis pulses 2/4 bilateral and Posterior Tibial pulses 2/4 bilateral. Capillary fill time <4 seconds bilateral. No edema observed bilateral. Varicosities absent bilateral. Skin temperature of the lower extremities is warm to warm, proximal to distal. Hair growth dim bilateral.  DERMATOLOGICAL: No skin rashes, subcutaneous nodules, lesions, or ulcers observed bilateral. Nails 1,2,3,4,5 right and 2,3,4,5 left elongated, thickened, and discolored with subungual debris. Webspaces 1,2,3,4 bilateral clean, dry and without evidence of break in skin integrity. Mild hyperkeratotic tissue noted to left medial 1st MPJ and distal hallux amputation stump.   NEUROLOGICAL: Light touch, sharp-dull, proprioception all present and equal bilaterally.  Vibratory sensation diminished at bilateral hallux. Protective sensation absent sites as tested with a [place]immie 5.07 monofilament.   MUSCULOSKELETAL: Muscle strength is 5/5 for foot inverters, everters, plantarflexors, and dorsiflexors. Muscle tone is normal. Left hallux amputation noted.           Assessment:       ICD-10-CM ICD-9-CM   1. Type II diabetes mellitus with neurological manifestations  E11.49 250.60   2. Pre-ulcerative calluses - Left Foot  L84 700   3. Dermatophytosis of nail  B35.1 110.1           Plan:   Type II diabetes mellitus with neurological manifestations    Pre-ulcerative calluses - Left Foot    Dermatophytosis of nail    I counseled the patient on his conditions, regarding findings of my  examination, my impressions, and usual treatment plan.   Appointment spent on education about the diabetic foot, neuropathy, and prevention of limb loss.  Shoe inspection. Diabetic Foot Education. Patient reminded of the importance of good nutrition and blood sugar control to help prevent podiatric complications of diabetes. Patient instructed on proper foot hygeine. We discussed wearing proper shoe gear, daily foot inspections, never walking without protective shoe gear, never putting sharp instruments to feet.    With patient's permission, nails 1-5 bilateral were debrided/trimmed in length and thickness aggressively to their soft tissue attachment mechanically and with electric , removing all offending nail and debris. Patient relates relief following the procedure.  With patient's permission, left foot pre-ulcerative callus trimmed in thickness with #15 blade in thickness without incident.   Patient  will continue to monitor the areas daily, inspect feet, wear protective shoe gear when ambulatory, moisturizer to maintain skin integrity. Patient reminded of the importance of good nutrition and blood sugar control to help prevent podiatric complications of diabetes.  Patient to return 4 months or sooner if needed.            Barb Veras DPM  [facility]

## 2024-10-14 LAB — ANA SER QL IF: NORMAL

## 2024-10-15 ENCOUNTER — TELEPHONE (OUTPATIENT)
Dept: OPHTHALMOLOGY | Facility: CLINIC | Age: 69
End: 2024-10-15
Payer: MEDICARE

## 2024-10-16 ENCOUNTER — TELEPHONE (OUTPATIENT)
Dept: OPHTHALMOLOGY | Facility: CLINIC | Age: 69
End: 2024-10-16
Payer: MEDICARE

## 2024-10-16 NOTE — TELEPHONE ENCOUNTER
Called pt mailbox full   Called 2nd number and family said they would tell them to call us back to reschedule Friday appt

## 2024-10-18 ENCOUNTER — TELEPHONE (OUTPATIENT)
Dept: NEUROLOGY | Facility: CLINIC | Age: 69
End: 2024-10-18
Payer: MEDICARE

## 2024-10-18 NOTE — TELEPHONE ENCOUNTER
----- Message from Donovanevertereza sent at 10/18/2024 12:12 PM CDT -----  Contact: Crow  Type:  Patient Requesting a call back     Who Called:Crow  What is the call back request regarding?:Requesting a call back in regards to having issues getting home health to come out and help with his medication. He has not taken any medicine in almost 2 weeks  Would the patient rather a call back or a response via [facility]?call  Best Call Back Number:[phone]   Additional Information:  
Called pt to advise that I spoke with home health. They sent a message to the intake dept regarding his pending status and call us back. I told him I would reach back out once they call us back. Pt verbalized understanding.         
23-May-2020

## 2024-10-21 ENCOUNTER — TELEPHONE (OUTPATIENT)
Dept: NEUROLOGY | Facility: CLINIC | Age: 69
End: 2024-10-21
Payer: MEDICARE

## 2024-10-21 NOTE — TELEPHONE ENCOUNTER
"Called pt back. Advised of    "Yes, he can receive both in home therapy and nursing for med management. He would need to cancel his outpatient therapy appts and wait on that until he is released from health. I can add a verbal order to add PT in the home if the patient is agreeable to this."    Patient agreed with canceling outpatient PT appointments, and doing them from home to receive both services. He confirmed that I can cancel his upcoming PT appointments and let home health know. I advised that someone would reach out regarding everything. He verbalized understanding.             "

## 2024-10-21 NOTE — TELEPHONE ENCOUNTER
"Called pt regarding message from home health     "Yes, he can receive both in home therapy and nursing for med management. He would need to cancel his outpatient therapy appts and wait on that until he is released from health. I can add a verbal order to add PT in the home if the patient is agreeable to this."    Pt did not answer. Unable to leave .   "

## 2024-10-21 NOTE — TELEPHONE ENCOUNTER
----- Message from Phyllis sent at 10/21/2024  9:54 AM CDT -----  Contact: [employer] [phone]  Patient is returning a phone call.    Who left a message for the patient: Allison Ba MA    Does patient know what this is regarding:  A message from Allison Ba MA    Would you like a call back, or a response through your [facility] portal?:   Call

## 2024-10-22 ENCOUNTER — OFFICE VISIT (OUTPATIENT)
Dept: OPHTHALMOLOGY | Facility: CLINIC | Age: 69
End: 2024-10-22
Payer: MEDICARE

## 2024-10-22 DIAGNOSIS — H40.1132 PRIMARY OPEN ANGLE GLAUCOMA (POAG) OF BOTH EYES, MODERATE STAGE: Primary | ICD-10-CM

## 2024-10-22 DIAGNOSIS — E11.3293 MILD NONPROLIFERATIVE DIABETIC RETINOPATHY OF BOTH EYES WITHOUT MACULAR EDEMA ASSOCIATED WITH TYPE 2 DIABETES MELLITUS: ICD-10-CM

## 2024-10-22 DIAGNOSIS — H25.13 NUCLEAR SCLEROSIS, BILATERAL: ICD-10-CM

## 2024-10-22 DIAGNOSIS — H35.372 EPIRETINAL MEMBRANE (ERM) OF LEFT EYE: ICD-10-CM

## 2024-10-22 PROCEDURE — 4010F ACE/ARB THERAPY RXD/TAKEN: CPT | Mod: CPTII,S$GLB,, | Performed by: OPHTHALMOLOGY

## 2024-10-22 PROCEDURE — 1160F RVW MEDS BY RX/DR IN RCRD: CPT | Mod: CPTII,S$GLB,, | Performed by: OPHTHALMOLOGY

## 2024-10-22 PROCEDURE — 1159F MED LIST DOCD IN RCRD: CPT | Mod: CPTII,S$GLB,, | Performed by: OPHTHALMOLOGY

## 2024-10-22 PROCEDURE — 2022F DILAT RTA XM EVC RTNOPTHY: CPT | Mod: CPTII,S$GLB,, | Performed by: OPHTHALMOLOGY

## 2024-10-22 PROCEDURE — G2211 COMPLEX E/M VISIT ADD ON: HCPCS | Mod: S$GLB,,, | Performed by: OPHTHALMOLOGY

## 2024-10-22 PROCEDURE — 99999 PR PBB SHADOW E&M-EST. PATIENT-LVL IV: CPT | Mod: PBBFAC,,, | Performed by: OPHTHALMOLOGY

## 2024-10-22 PROCEDURE — 92133 CPTRZD OPH DX IMG PST SGM ON: CPT | Mod: S$GLB,,, | Performed by: OPHTHALMOLOGY

## 2024-10-22 PROCEDURE — 99214 OFFICE O/P EST MOD 30 MIN: CPT | Mod: S$GLB,,, | Performed by: OPHTHALMOLOGY

## 2024-10-22 PROCEDURE — 3044F HG A1C LEVEL LT 7.0%: CPT | Mod: CPTII,S$GLB,, | Performed by: OPHTHALMOLOGY

## 2024-10-22 RX ORDER — KETOROLAC TROMETHAMINE 5 MG/ML
1 SOLUTION OPHTHALMIC 4 TIMES DAILY
Qty: 5 ML | Refills: 1 | Status: SHIPPED | OUTPATIENT
Start: 2024-10-22 | End: 2024-10-27

## 2024-10-22 RX ORDER — LATANOPROST 50 UG/ML
1 SOLUTION/ DROPS OPHTHALMIC NIGHTLY
Qty: 7.5 ML | Refills: 4 | Status: SHIPPED | OUTPATIENT
Start: 2024-10-22 | End: 2025-10-22

## 2024-10-22 NOTE — PROGRESS NOTES
HPI     Glaucoma            Comments: Pt reports for coag lost to f/u. Denies any pain or irritation.   Va blurry. Noted a big floater in OD that drift.           Comments    1. DM   BDR and ME   2. Cataracts OU   3. COAG OU  GCL 28/33-- 2022  4. Severe non compliance       Drop list:  Latanoprost 1x daily -- both eyes             Last edited by Gio Hauser on 10/22/2024  2:23 PM.            Assessment /Plan     For exam results, see Encounter Report.      ICD-10-CM ICD-9-CM    1. Primary open angle glaucoma (POAG) of both eyes, moderate stage  H40.1132 365.11 latanoprost 0.005 % ophthalmic solution     365.72 Posterior Segment OCT Optic Nerve- Both eyes  IOP not within acceptable range relative to target IOP with risk of irreversible visual loss. Additional treatment required.  Discussed options, risks, and benefits of additional medication, SLT laser, or incisional glaucoma surgery.     Recommend: SLT OU  Use keto after laser   Pt defers meeting with surgery scheduler today and will come back Friday to meet with them at [address] floor     Patient chooses the above     Reviewed importance of continued compliance with treatment and follow up.     Visit today included increased complexity associated with the care and management of glaucoma, cataracts and diabetes . Written instructions were placed in the portal for the patient upon closure of the encounter regarding specific use of the required medications.             2. Mild nonproliferative diabetic retinopathy of both eyes without macular edema associated with type 2 diabetes mellitus  E11.3293 250.50 Stable retinopathy with no evidence of new edema or neovascularization.  Reviewed diabetic eye precautions including avoiding tobacco use, glucose control, and importance of regular follow up.        362.04       3. Nuclear sclerosis, bilateral  H25.13 366.16 Follow       4. Epiretinal membrane (ERM) of left eye  H35.372 362.56 New onset, follow at this time            Please use your drops as follows:    Latanoprost once a day in both eyes(s)    Use this medication at the time of day that is easiest for you to remember. Please wipe the excess of this medication off the eyelid skin after instillation and place pressure on the lids near your nose for 1-2 minutes after using it.

## 2024-10-22 NOTE — Clinical Note
PT NEEDS SLT OU, PT RIDE WAS READY AND HE LEFT, WILL COME BACK FRIDAY AM TO SEE JOAQUIM TO BOOK LASER THEN, AUTH AND MEDS WERE PLACED TODAY

## 2024-10-24 ENCOUNTER — TELEPHONE (OUTPATIENT)
Dept: NEUROLOGY | Facility: CLINIC | Age: 69
End: 2024-10-24
Payer: MEDICARE

## 2024-10-24 RX ORDER — ALBUTEROL SULFATE 90 UG/1
INHALANT RESPIRATORY (INHALATION)
Qty: 25.5 G | Refills: 3 | Status: SHIPPED | OUTPATIENT
Start: 2024-10-24

## 2024-10-24 NOTE — TELEPHONE ENCOUNTER
Called pt regarding message. Pt stated that he feels like he is going crazy trying to keep track of his appts and asked if Ms. Iqbal can help him organize his schedule. I advised that I can try to reach out to home health, since they are working to get him scheduled. He said that they came in but no nurse was with her, and whoever came in couldn't do anything until the nurse got there. He expressed extreme frustration with everything and said he needs us to organize his schedule due to him having to call transportation 3 days ahead to schedule. I offered to reach out to home health, as they will be able to have someone come in for his PT, med management and other things. He eventually hung up on me.

## 2024-10-24 NOTE — TELEPHONE ENCOUNTER
Refill Decision Note   [facility]  is requesting a refill authorization.  Brief Assessment and Rationale for Refill:        Medication Therapy Plan:            Comments:     Note composed:3:34 PM 10/24/2024

## 2024-10-24 NOTE — TELEPHONE ENCOUNTER
----- Message from Kaila sent at 10/24/2024 12:20 PM CDT -----  States he would like to speak to the nurse. He wouldn't say what it was regarding. Please call pt [phone]. Thank you

## 2024-10-28 ENCOUNTER — TELEPHONE (OUTPATIENT)
Dept: PAIN MEDICINE | Facility: CLINIC | Age: 69
End: 2024-10-28
Payer: MEDICARE

## 2024-10-31 ENCOUNTER — TELEPHONE (OUTPATIENT)
Dept: NEUROLOGY | Facility: CLINIC | Age: 69
End: 2024-10-31
Payer: MEDICARE

## 2024-11-05 PROCEDURE — 99499 UNLISTED E&M SERVICE: CPT | Mod: ,,,

## 2024-11-08 ENCOUNTER — HOSPITAL ENCOUNTER (OUTPATIENT)
Dept: RADIOLOGY | Facility: HOSPITAL | Age: 69
Discharge: HOME | End: 2024-11-08
Payer: MEDICARE

## 2024-11-08 DIAGNOSIS — R42 DIZZINESS AND GIDDINESS: ICD-10-CM

## 2024-11-08 DIAGNOSIS — G43.E19 INTRACTABLE CHRONIC MIGRAINE WITH AURA AND WITHOUT STATUS MIGRAINOSUS: ICD-10-CM

## 2024-11-08 PROCEDURE — 70551 MRI BRAIN STEM W/O DYE: CPT | Mod: TC

## 2024-11-08 PROCEDURE — 70551 MRI BRAIN STEM W/O DYE: CPT | Mod: 26,,, | Performed by: RADIOLOGY

## 2024-11-11 ENCOUNTER — TELEPHONE (OUTPATIENT)
Dept: DIABETES | Facility: CLINIC | Age: 69
End: 2024-11-11
Payer: MEDICARE

## 2024-11-11 NOTE — TELEPHONE ENCOUNTER
----- Message from Nina sent at 11/11/2024 12:28 PM CST -----  Contact: Crow Wright is calling in regards to his sugar dropping.please call back at [phone]             Thanks  FOUZIA

## 2024-11-12 ENCOUNTER — OFFICE VISIT (OUTPATIENT)
Dept: INTERNAL MEDICINE | Facility: CLINIC | Age: 69
End: 2024-11-12
Payer: MEDICARE

## 2024-11-12 ENCOUNTER — LAB VISIT (OUTPATIENT)
Dept: LAB | Facility: HOSPITAL | Age: 69
End: 2024-11-12
Attending: FAMILY MEDICINE
Payer: MEDICARE

## 2024-11-12 ENCOUNTER — OFFICE VISIT (OUTPATIENT)
Dept: NEUROLOGY | Facility: CLINIC | Age: 69
End: 2024-11-12
Payer: MEDICARE

## 2024-11-12 VITALS
BODY MASS INDEX: 31.51 KG/M2 | OXYGEN SATURATION: 98 % | WEIGHT: 189.13 LBS | HEART RATE: 73 BPM | DIASTOLIC BLOOD PRESSURE: 64 MMHG | SYSTOLIC BLOOD PRESSURE: 116 MMHG | HEIGHT: 65 IN | TEMPERATURE: 98 F

## 2024-11-12 VITALS
WEIGHT: 189.13 LBS | DIASTOLIC BLOOD PRESSURE: 64 MMHG | RESPIRATION RATE: 16 BRPM | SYSTOLIC BLOOD PRESSURE: 116 MMHG | BODY MASS INDEX: 31.51 KG/M2 | OXYGEN SATURATION: 99 % | HEIGHT: 65 IN

## 2024-11-12 DIAGNOSIS — I65.23 BILATERAL CAROTID ARTERY STENOSIS: ICD-10-CM

## 2024-11-12 DIAGNOSIS — R11.0 NAUSEA: ICD-10-CM

## 2024-11-12 DIAGNOSIS — Z79.4 TYPE 2 DIABETES MELLITUS WITH STAGE 3A CHRONIC KIDNEY DISEASE, WITH LONG-TERM CURRENT USE OF INSULIN: ICD-10-CM

## 2024-11-12 DIAGNOSIS — R29.898 NECK TIGHTNESS: ICD-10-CM

## 2024-11-12 DIAGNOSIS — G43.E19 INTRACTABLE CHRONIC MIGRAINE WITH AURA AND WITHOUT STATUS MIGRAINOSUS: Primary | ICD-10-CM

## 2024-11-12 DIAGNOSIS — I50.42 CHRONIC COMBINED SYSTOLIC AND DIASTOLIC HEART FAILURE: ICD-10-CM

## 2024-11-12 DIAGNOSIS — H53.8 BLURRY VISION, BILATERAL: ICD-10-CM

## 2024-11-12 DIAGNOSIS — N18.31 TYPE 2 DIABETES MELLITUS WITH STAGE 3A CHRONIC KIDNEY DISEASE, WITH LONG-TERM CURRENT USE OF INSULIN: ICD-10-CM

## 2024-11-12 DIAGNOSIS — I48.0 PAF (PAROXYSMAL ATRIAL FIBRILLATION): Chronic | ICD-10-CM

## 2024-11-12 DIAGNOSIS — M1A.09X0 CHRONIC GOUT OF MULTIPLE SITES, UNSPECIFIED CAUSE: ICD-10-CM

## 2024-11-12 DIAGNOSIS — I65.21 CAROTID STENOSIS, RIGHT: ICD-10-CM

## 2024-11-12 DIAGNOSIS — N28.9 RENAL INSUFFICIENCY: ICD-10-CM

## 2024-11-12 DIAGNOSIS — R97.20 ELEVATED PSA: Primary | ICD-10-CM

## 2024-11-12 DIAGNOSIS — R79.82 ELEVATED C-REACTIVE PROTEIN (CRP): ICD-10-CM

## 2024-11-12 DIAGNOSIS — E11.3312 MODERATE NONPROLIFERATIVE DIABETIC RETINOPATHY OF LEFT EYE WITH MACULAR EDEMA ASSOCIATED WITH TYPE 2 DIABETES MELLITUS: ICD-10-CM

## 2024-11-12 DIAGNOSIS — E11.22 TYPE 2 DIABETES MELLITUS WITH STAGE 3A CHRONIC KIDNEY DISEASE, WITH LONG-TERM CURRENT USE OF INSULIN: ICD-10-CM

## 2024-11-12 DIAGNOSIS — I10 ESSENTIAL HYPERTENSION: ICD-10-CM

## 2024-11-12 DIAGNOSIS — H93.13 TINNITUS OF BOTH EARS: ICD-10-CM

## 2024-11-12 DIAGNOSIS — G47.33 OSA (OBSTRUCTIVE SLEEP APNEA): ICD-10-CM

## 2024-11-12 DIAGNOSIS — R26.89 BALANCE PROBLEM: ICD-10-CM

## 2024-11-12 DIAGNOSIS — Z91.148 NON COMPLIANCE W MEDICATION REGIMEN: ICD-10-CM

## 2024-11-12 DIAGNOSIS — F41.1 GAD (GENERALIZED ANXIETY DISORDER): ICD-10-CM

## 2024-11-12 DIAGNOSIS — R70.0 ELEVATED SED RATE: ICD-10-CM

## 2024-11-12 DIAGNOSIS — R42 DIZZINESS AND GIDDINESS: ICD-10-CM

## 2024-11-12 DIAGNOSIS — R41.3 POOR SHORT TERM MEMORY: ICD-10-CM

## 2024-11-12 LAB
ANION GAP SERPL CALC-SCNC: 9 MMOL/L (ref 8–16)
BUN SERPL-MCNC: 38 MG/DL (ref 8–23)
CALCIUM SERPL-MCNC: 8.6 MG/DL (ref 8.7–10.5)
CHLORIDE SERPL-SCNC: 101 MMOL/L (ref 95–110)
CO2 SERPL-SCNC: 28 MMOL/L (ref 23–29)
CREAT SERPL-MCNC: 2.7 MG/DL (ref 0.5–1.4)
EST. GFR  (NO RACE VARIABLE): 25 ML/MIN/1.73 M^2
GLUCOSE SERPL-MCNC: 119 MG/DL (ref 70–110)
POTASSIUM SERPL-SCNC: 3.9 MMOL/L (ref 3.5–5.1)
SODIUM SERPL-SCNC: 138 MMOL/L (ref 136–145)

## 2024-11-12 PROCEDURE — 3044F HG A1C LEVEL LT 7.0%: CPT | Mod: CPTII,S$GLB,,

## 2024-11-12 PROCEDURE — 3062F POS MACROALBUMINURIA REV: CPT | Mod: CPTII,S$GLB,,

## 2024-11-12 PROCEDURE — 99214 OFFICE O/P EST MOD 30 MIN: CPT | Mod: S$GLB,,, | Performed by: FAMILY MEDICINE

## 2024-11-12 PROCEDURE — 3074F SYST BP LT 130 MM HG: CPT | Mod: CPTII,S$GLB,, | Performed by: FAMILY MEDICINE

## 2024-11-12 PROCEDURE — 1125F AMNT PAIN NOTED PAIN PRSNT: CPT | Mod: CPTII,S$GLB,,

## 2024-11-12 PROCEDURE — 1159F MED LIST DOCD IN RCRD: CPT | Mod: CPTII,S$GLB,,

## 2024-11-12 PROCEDURE — 3066F NEPHROPATHY DOC TX: CPT | Mod: CPTII,S$GLB,,

## 2024-11-12 PROCEDURE — 1160F RVW MEDS BY RX/DR IN RCRD: CPT | Mod: CPTII,S$GLB,,

## 2024-11-12 PROCEDURE — 1125F AMNT PAIN NOTED PAIN PRSNT: CPT | Mod: CPTII,S$GLB,, | Performed by: FAMILY MEDICINE

## 2024-11-12 PROCEDURE — 3044F HG A1C LEVEL LT 7.0%: CPT | Mod: CPTII,S$GLB,, | Performed by: FAMILY MEDICINE

## 2024-11-12 PROCEDURE — 4010F ACE/ARB THERAPY RXD/TAKEN: CPT | Mod: CPTII,S$GLB,, | Performed by: FAMILY MEDICINE

## 2024-11-12 PROCEDURE — 3008F BODY MASS INDEX DOCD: CPT | Mod: CPTII,S$GLB,, | Performed by: FAMILY MEDICINE

## 2024-11-12 PROCEDURE — 3066F NEPHROPATHY DOC TX: CPT | Mod: CPTII,S$GLB,, | Performed by: FAMILY MEDICINE

## 2024-11-12 PROCEDURE — 3062F POS MACROALBUMINURIA REV: CPT | Mod: CPTII,S$GLB,, | Performed by: FAMILY MEDICINE

## 2024-11-12 PROCEDURE — 99999 PR PBB SHADOW E&M-EST. PATIENT-LVL V: CPT | Mod: PBBFAC,,,

## 2024-11-12 PROCEDURE — 1159F MED LIST DOCD IN RCRD: CPT | Mod: CPTII,S$GLB,, | Performed by: FAMILY MEDICINE

## 2024-11-12 PROCEDURE — 1101F PT FALLS ASSESS-DOCD LE1/YR: CPT | Mod: CPTII,S$GLB,, | Performed by: FAMILY MEDICINE

## 2024-11-12 PROCEDURE — 3074F SYST BP LT 130 MM HG: CPT | Mod: CPTII,S$GLB,,

## 2024-11-12 PROCEDURE — 3078F DIAST BP <80 MM HG: CPT | Mod: CPTII,S$GLB,,

## 2024-11-12 PROCEDURE — 36415 COLL VENOUS BLD VENIPUNCTURE: CPT | Performed by: FAMILY MEDICINE

## 2024-11-12 PROCEDURE — G2211 COMPLEX E/M VISIT ADD ON: HCPCS | Mod: S$GLB,,, | Performed by: FAMILY MEDICINE

## 2024-11-12 PROCEDURE — 4010F ACE/ARB THERAPY RXD/TAKEN: CPT | Mod: CPTII,S$GLB,,

## 2024-11-12 PROCEDURE — 3008F BODY MASS INDEX DOCD: CPT | Mod: CPTII,S$GLB,,

## 2024-11-12 PROCEDURE — 99213 OFFICE O/P EST LOW 20 MIN: CPT | Mod: S$GLB,,,

## 2024-11-12 PROCEDURE — 3078F DIAST BP <80 MM HG: CPT | Mod: CPTII,S$GLB,, | Performed by: FAMILY MEDICINE

## 2024-11-12 PROCEDURE — 80048 BASIC METABOLIC PNL TOTAL CA: CPT | Performed by: FAMILY MEDICINE

## 2024-11-12 PROCEDURE — 3288F FALL RISK ASSESSMENT DOCD: CPT | Mod: CPTII,S$GLB,, | Performed by: FAMILY MEDICINE

## 2024-11-12 PROCEDURE — 99999 PR PBB SHADOW E&M-EST. PATIENT-LVL V: CPT | Mod: PBBFAC,,, | Performed by: FAMILY MEDICINE

## 2024-11-12 RX ORDER — LISINOPRIL 10 MG/1
10 TABLET ORAL
COMMUNITY
Start: 2024-11-12

## 2024-11-12 RX ORDER — TOPIRAMATE 25 MG/1
25 TABLET ORAL 2 TIMES DAILY
Qty: 60 TABLET | Refills: 2 | Status: SHIPPED | OUTPATIENT
Start: 2024-11-12 | End: 2025-02-10

## 2024-11-12 RX ORDER — METHOCARBAMOL 750 MG/1
750 TABLET, FILM COATED ORAL 2 TIMES DAILY PRN
COMMUNITY
Start: 2024-11-06

## 2024-11-12 RX ORDER — TRAMADOL HYDROCHLORIDE 50 MG/1
50 TABLET ORAL EVERY 6 HOURS PRN
COMMUNITY
Start: 2024-10-16

## 2024-11-12 NOTE — PATIENT INSTRUCTIONS
Avoid all antiinflammatory meds like ibuprofen or aleve  Please obtain the RSV vaccine via your pharmacy  Dr Horner is your pain management doctor that you saw last july
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
## 2024-11-12 NOTE — PROGRESS NOTES
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 weight and in agreement with clinical  observations.    Premature Infant recommended reference ranges:  Up to 24 hours.............<8.0 mg/dL  Up to 48 hours............<12.0 mg/dL  3-5 days..................<15.0 mg/dL  6-29 days.................<15.0 mg/dL       Alkaline Phosphatase   Date Value Ref Range Status   11/08/2024 81 40 - 150 U/L Final     AST   Date Value Ref Range Status   11/08/2024 13 10 - 40 U/L Final     ALT   Date Value Ref Range Status  "  11/08/2024 13 10 - 44 U/L Final     Anion Gap   Date Value Ref Range Status   11/08/2024 15 8 - 16 mmol/L Final     eGFR if    Date Value Ref Range Status   07/31/2022 >60 >60 mL/min/1.73 m^2 Final     eGFR    Date Value Ref Range Status   07/28/2023 43 mL/min/1.73mSq Final     Comment:     In accordance with NKF-ASN Task Force recommendation, calculation based on the Chronic Kidney Disease Epidemiology Collaboration (CKD-EPI) equation without adjustment for race. eGFR adjusted for gender and age and calculated in ml/min/1.73mSquared. eGFR cannot be calculated if patient is under 18 years of age.     Reference Range:   >= 60 ml/min/1.73mSquared.     eGFR if non    Date Value Ref Range Status   07/31/2022 >60 >60 mL/min/1.73 m^2 Final     Comment:     Calculation used to obtain the estimated glomerular filtration  rate (eGFR) is the CKD-EPI equation.          Lab Results   Component Value Date    [id number] 623 10/11/2024       Lab Results   Component Value Date    TSH 0.846 11/08/2024       No results found in the last 24 hours.    No results found in the last 24 hours.    Reviewed the neuroimaging independently       Assessment:   69 Years old Male with PMH as above came for an evaluation of "Headaches"    Intractable chronic migraine with aura and without status migrainosus     Nausea     Tinnitus of both ears     Dizziness and giddiness     Neck tightness     Balance problem     Blurry vision, bilateral     Non compliance w medication regimen     SANDRITA (obstructive sleep apnea)     Elevated sed rate     Elevated C-reactive protein (CRP)     Type 2 diabetes mellitus with stage 3a chronic kidney disease, with long-term current use of insulin     Carotid stenosis, right     Poor short term memory       Plan:   The neurological assessment of the patient reveals bilateral lower extremity weakness, balance and coordination difficulties, and tenderness upon palpation in " the left temporal and parietal regions, as well as bilateral neck tenderness.      Intractable chronic migraine with aura and without status migrainosus     -Continue Topamax 25 mg PO BID for Headache Prevention Therapy. No HX of Kidney Stones. Side effects discussed (mental slowing, transient tingling, kidney stones, weight loss, cleft lip and palate and rarely glaucoma and visual field defects). The patient was encouraged to drink a lot of fluids. Patient verbalized understanding. Refills sent.     -The patient has a past medical history of primary open-angle glaucoma (POAG) in both eyes, moderate stage, as noted in the ophthalmology referral. He was informed about the potential side effects of Topamax - particularly glaucoma and the possibility of worsening his current condition. The patient verbalized his full understanding of these risks and expressed a desire to remain on his current medication. Although alternative options were discussed, the patient declined them and wishes to continue taking Topamax due to its effectiveness in managing his headaches. No side effects noted per patient.     -Reviewed results of Brain MRI WO Contrast / RPR / HIV / AMOL / B-12 / FA / Patient verbalized understanding.     -Continue PT for Migraine Therapy / Neck Tightness / Vestibular Therapy / Bilateral leg weakness /  Balance issues /     Nausea     Patient declines pharmacological therapy.     Tinnitus of both ears / Dizziness and giddiness     -Continue ENT referral for further evbaluation and recommendation of bilateral tinnitus / dizziness     Neck tightness / Balance problem     - Continue PT for Migraine Therapy / Neck Tightness / Vestibular Therapy / Bilateral leg weakness /  Balance issues /     Blurry vision, bilateral     -Continue Ophthalmology Evaluation for vision abnormalities, especially being on Topamax.     Non compliance w medication regimen     -The patient reports previously receiving home health services  for medication assistance, which were discontinued. He indicates a need for another referral, as he has not taken his medications in the past week. The patient mentioned that the home health nurse had been organizing his medications weekly. Services reordered to provide the necessary support. Patient instructed on importance of medication compliance. He verbalized understanding.     SANDRITA (obstructive sleep apnea)     -Continue Referral to Sleep Disorders for management of SANDRITA and mask evaluation. Patient instructed on the importance of compliance with CPAP Machine for SANDRITA. He verbalized understanding.      Elevated sed rate / Elevated C-reactive protein (CRP)     -Continue Referral to Rheumatologist for elevated ESR / CRP    Type 2 diabetes mellitus with stage 3a chronic kidney disease, with long-term current use of insulin     -DM- A1C elevated (5.7). Patient is instructed on lifestyle modifications such as heart healthy diet, limiting excess sugar, limiting excess fried and fatty foods, weight management, eating lean meats and vegetables, staying hydrated, avoiding alcohol and smoking, and exercising at least 30 minutes per day. Patient instructed to continue taking medications as prescribed and to continue to follow PCP for management. Patient verbalizes understanding.      Carotid stenosis, right     -Continue Referral to Vascular Surgery for evaluation of Severe approximately 80-85% stenosis of the proximal right cervical internal carotid artery. Severe stenosis of the right V1 segment. Patient unsure if this was addressed.    Poor short term memory     -Will plan to address short-term memory concerns at next visit. Patient declines to discuss today.               LABORATORY EVALUATION    Labs: (2024) Uric acid / PSA / TSH / Lipid Panel / RPR / HIV / AMOL / B-12 / FA /  Lactic Acid / CBC / CMP / Aldolase / PSA / Rheumatoid Factor  - personally reviewed -non-significant abnormalities except     -Vitamin D (18) -  followed by PCP - patient reports he is taking a vitamin-D supplement.    -BUN (32) / Creatinine (2.7) / GFR (25)  - Followed by PCP    -Toxicology Screen: Cocaine / Opiates - Followed by PCP    -A1C (5.7) - Followed by PCP - Patient taking Ozempic / Jardiance / NOVALOG     ESR (92)  / CRP (9.6) - elevated - Orderd Referral to Rheumatology      RADIOLOGY EVALUATION     Bilateral Cartoid US - ordered 08/2024 - pending     Personally Reviewed Brain MRI WO - done 11/2024 - No acute or focal abnormality. Mild cerebral atrophy and white matter degeneration     Personally Reviewed CTA Neck - done 2024 - report only - No evidence of acute vascular injury within the neck. Severe approximately 80-85% stenosis of the proximal right cervical internal carotid artery.   Severe stenosis of the right V1 segment. - Sent referral to Vascular Surgery - Patient unsure if this was addressed.     Personally Reviewed Head CT WO Contrast - done 07/2024  - report only - No acute intracranial abnormality. Findings of chronic small vessel ischemic disease.     Personally Reviewed CT Thoracic Spine WO Contrast -  done 07/2024  - report only - No acute fracture or traumatic malalignment.     Personally Reviewed CT Lumbar Spine WO Contrast -  done 07/2024  - report only - No acute fracture or traumatic malalignment. Degenerative changes as above.       NEUROPHYSIOLOGY EVALUATION       PATHOLOGY EVALUATION        NEUROCOGNITIVE AND NEUROPSYCHOLOGY EVALUATION                         MIGRAINE, COMMON, WITH AURA, EPISODIC, HIGH FREQUENCY       MANAGEMENT       HEADACHE DIARY     DISCUSSED THE THREE-FOLD MANAGEMENT OF MIGRAINE:      LIFESTYLE CHANGES:       Good sleep hygiene  Avoid general triggers like lack of sleep/too much sleep, prolonged sun exposure, excessive screen time and specific triggers based on you own diary   Minimize physical and emotional stress  Smoking avoidance and cessation  Limit caffeine drinks to 1-2 a day   Good hydration    Small frequent meals and avoid skipping meals   Moderate 30-minute-long aerobic exercises 3 times/week. Avoid strenuous exercise         ABORTIVE MEDICATIONS (ACUTE-RESCUE MEDICATIONS):     Should only be taken 2-3 times/week to avoid rebound and overuse headaches.    I-explained to the patient that pain meds especially triptans should NOT be taken daily to avoid Rebound Headache and Overuse Headache.    Take at the ONSET of the headache sumatriptan 100 mg PO  (or other Triptan) in combination with naproxen 500 mg PO or Ibuprofen 800 mg PO for headache without nausea or vomiting.  This regimen can be repeated only once in 24 hours after 2 hours.    Side effects of triptans were discussed and include rare cardiac and cerebral ischemia and cannot be used with migraine associate with focal neurological deficits (complicated migraine) in addition to drowsiness and potential impairment of driving ability. The patient verbalized understanding.    NSAIDs can cause peptic ulcers, renal insufficiency and may increase the risk of cardiovascular diseases.  SEs were discussed with the patient. The patient verbalized understanding.    Triptans have shown to be more effective than Gepatns with more SE/AE.      AVOID NARCOTICS (OPIATES)      1. No randomized controlled study shows pain-free results with opioids in the treatment of migraine.     2. The physiologic consequences of opioid use are adverse, occur quickly, and can be permanent. Decreased gray matter, release of calcitonin gene-related peptide, dynorphin, and pro-inflammatory peptides, and activation of excitatory glutamate receptors are all associated with opioid exposure.     3. Opioids are pro-nociceptive, prevent reversal of migraine central sensitization, and interfere with triptan effectiveness.     4.Opioids precipitate bad clinical outcomes, especially transformation to daily headache.     5. They cause disease progression, comorbidity, and excessive health  care consumption.           NEXT OPTIONS:    Triptans: Sumatriptan (Imitrex), Rizatriptan (Maxalt).    Gepants: Nuretc (rimegepant)75 mg >Ubrelvy (ubrogepant) 100 mg    Ditans: Reyvow (lasmiditan) 100 mg (No driving due to sedation)    Fioricet without codeine with Reglan.      Prednisone with Reglan.      LAST RESORT:     DHE NS Trudhesa (Max 2 a week)     C/I: concomitant use of vasoconstrictors like Triptans, strong CY inhibitors such as HAART PIs (eg, ritonavir, nelfinavir, or indinavir) and Macrolides (eg, erythromycin or clarithromycin), CAD, PVD, Stroke/TIA and Uncontrolled HTN.  Serious SEs include Vasospasm and Fibrosis (chronic use).       IMPENDING STATUS: Prednisone and Vistaril.    STATUS MIGRAINOSUS: ED-Infusion for Status Protocol.        PREVENTATIVE (MORE ACCURATELY MIGRAINE REDUCTION) MEDICATIONS:           Since the patient's headache is very frequent a lengthy discussion about preventative medications was carried out.The patient understands that prevention means DECREASING frequency and severity and NOT elimination.The patient was made aware that any new medication can cause serious allergic reaction.The medication is considered failure only if a therapeutic dose reached and maintained for 6-8 weeks.        HELPFUL SUPPLEMENTS:     Helpful supplements include Co-Q 10, B2, Mg, Feverfew (Dolovent combination) and butterbur (Petadolex)        NEUROPHARMACOLOGY     NEXT OPTIONS:     Zonisamide/Zonegran (ZNS) 100-400 mg QHS is a good alternative to TPM in case of SE/AE.     Amitriptyline/Elavil (TCA) slow titration to 100-Age which can cause sleepiness, dry eyes, dry mouth, urinary retention, and rarely cardiac arrhythmias    Propranolol/Inderal  (BB)slow titration to 80 mg BID which can cause low blood pressure, slow heart rate, erectile dysfunction, depression, airway obstruction and heart failure exacerbations. Cannot be used with migraine associate with focal neurological  deficits.    Lamotrigine/Lamictal  (LTG)slow titration to 100 mg BID which can cause serious skin rash and rare cardiac arrhythmias. LTG is superior to other therapies for specifically reducing migraine aura.     ANTI-CGRP AGENTS: Qulipta (alogepant) 60 mg QD, Erenumab (Aimovig) 140 mg SQ Pen monthly (Reported cases of Constipation and BP elevation) , Galcanezumab (Emgality) 120 mg SQ Pen monthly after a loading dose of 240 mg  and Fremnezumab (Ajovy) (Ligand Blocker): 225 mg SQ monthly or 675 mg every 3 months     Botox 200 units every 3 months.         LAST RESORT OPTIONS:      Namenda 10 mg BID     Valproic acid/ Depakote         NEUROMODULATION     Cefaly, Relivion, Nerivio and GammaCore (VNS)           SCHOOL AND WORK ACCOMMODATIONS        Allow the patient to wear sunglasses or a cap and switch out fluorescent bulbs.    Allow the patient to arrive 5 minutes later and leave 5 minutes earlier to avoid noisy traffic.    Allow the patient to carry a water bottle and refill as needed.    Allow the patient to snack whenever is needed.    Allow the patient to decrease the computer brightness.    Allow the patient to take breaks as needed and extra time for assignments and deadlines.    Allow the patient to avoid strenuous activity as needed.         MEDICAL/SURGICAL COMORBIDITIES     All relevant medical comorbidities noted and managed by primary care physician and medical care team.          HEALTHY LIFESTYLE AND PREVENTATIVE CARE    The patient to adhere to the age-appropriate health maintenance guidelines including screening tests and vaccinations. The patient to adhere to  healthy lifestyle, optimal weight, exercise, healthy diet, good sleep hygiene and avoiding drugs including smoking, alcohol and recreational drugs.    I spent a total of 26 minutes on the day of the visit.This includes face to face time and non-face to face time preparing to see the patient (eg, review of tests), obtaining and/or reviewing  separately obtained history, documenting clinical information in the electronic or other health record, independently interpreting results and communicating results to the patient/family/caregiver, or care coordinator.     Please do not hesitate to contact me with any updates, questions or concerns.    No follow-ups on file.    Rasheed Abreu, MSN, FNP-C    General Neurology

## 2024-11-13 ENCOUNTER — TELEPHONE (OUTPATIENT)
Dept: INTERNAL MEDICINE | Facility: CLINIC | Age: 69
End: 2024-11-13
Payer: MEDICARE

## 2024-11-13 ENCOUNTER — HOSPITAL ENCOUNTER (INPATIENT)
Facility: HOSPITAL | Age: 69
LOS: 2 days | Discharge: HOME HEALTH | DRG: 683 | End: 2024-11-15
Attending: EMERGENCY MEDICINE | Admitting: HOSPITALIST
Payer: MEDICARE

## 2024-11-13 ENCOUNTER — HOSPITAL ENCOUNTER (OUTPATIENT)
Dept: RADIOLOGY | Facility: HOSPITAL | Age: 69
Discharge: HOME | End: 2024-11-13
Attending: FAMILY MEDICINE
Payer: MEDICARE

## 2024-11-13 DIAGNOSIS — N28.9 RENAL INSUFFICIENCY: ICD-10-CM

## 2024-11-13 DIAGNOSIS — R79.89 ELEVATED TROPONIN I LEVEL: ICD-10-CM

## 2024-11-13 DIAGNOSIS — N17.9 ACUTE RENAL FAILURE, UNSPECIFIED ACUTE RENAL FAILURE TYPE: Primary | ICD-10-CM

## 2024-11-13 DIAGNOSIS — R07.9 CHEST PAIN: ICD-10-CM

## 2024-11-13 PROBLEM — E11.9 TYPE 2 DIABETES MELLITUS: Status: ACTIVE | Noted: 2024-11-13

## 2024-11-13 LAB
ALBUMIN SERPL BCP-MCNC: 3.3 G/DL (ref 3.5–5.2)
ALP SERPL-CCNC: 94 U/L (ref 40–150)
ALT SERPL W/O P-5'-P-CCNC: 15 U/L (ref 10–44)
ANION GAP SERPL CALC-SCNC: 10 MMOL/L (ref 8–16)
AST SERPL-CCNC: 16 U/L (ref 10–40)
BACTERIA #/AREA URNS HPF: NORMAL /HPF
BASOPHILS # BLD AUTO: 0.02 K/UL (ref 0–0.2)
BASOPHILS NFR BLD: 0.3 % (ref 0–1.9)
BILIRUB SERPL-MCNC: 0.2 MG/DL (ref 0.1–1)
BILIRUB UR QL STRIP: NEGATIVE
BUN SERPL-MCNC: 33 MG/DL (ref 8–23)
CALCIUM SERPL-MCNC: 8.7 MG/DL (ref 8.7–10.5)
CHLORIDE SERPL-SCNC: 107 MMOL/L (ref 95–110)
CLARITY UR: CLEAR
CO2 SERPL-SCNC: 27 MMOL/L (ref 23–29)
COLOR UR: YELLOW
CREAT SERPL-MCNC: 2.1 MG/DL (ref 0.5–1.4)
DIFFERENTIAL METHOD BLD: ABNORMAL
EOSINOPHIL # BLD AUTO: 0.2 K/UL (ref 0–0.5)
EOSINOPHIL NFR BLD: 2.7 % (ref 0–8)
ERYTHROCYTE [DISTWIDTH] IN BLOOD BY AUTOMATED COUNT: 14.6 % (ref 11.5–14.5)
EST. GFR  (NO RACE VARIABLE): 34 ML/MIN/1.73 M^2
GLUCOSE SERPL-MCNC: 98 MG/DL (ref 70–110)
GLUCOSE UR QL STRIP: ABNORMAL
HCT VFR BLD AUTO: 36.9 % (ref 40–54)
HGB BLD-MCNC: 11.9 G/DL (ref 14–18)
HGB UR QL STRIP: NEGATIVE
HYALINE CASTS #/AREA URNS LPF: 0 /LPF
IMM GRANULOCYTES # BLD AUTO: 0.03 K/UL (ref 0–0.04)
IMM GRANULOCYTES NFR BLD AUTO: 0.4 % (ref 0–0.5)
KETONES UR QL STRIP: NEGATIVE
LEUKOCYTE ESTERASE UR QL STRIP: NEGATIVE
LYMPHOCYTES # BLD AUTO: 2.1 K/UL (ref 1–4.8)
LYMPHOCYTES NFR BLD: 29.8 % (ref 18–48)
MCH RBC QN AUTO: 29 PG (ref 27–31)
MCHC RBC AUTO-ENTMCNC: 32.2 G/DL (ref 32–36)
MCV RBC AUTO: 90 FL (ref 82–98)
MICROSCOPIC COMMENT: NORMAL
MONOCYTES # BLD AUTO: 0.7 K/UL (ref 0.3–1)
MONOCYTES NFR BLD: 9.2 % (ref 4–15)
NEUTROPHILS # BLD AUTO: 4.1 K/UL (ref 1.8–7.7)
NEUTROPHILS NFR BLD: 57.6 % (ref 38–73)
NITRITE UR QL STRIP: NEGATIVE
NRBC BLD-RTO: 0 /100 WBC
PH UR STRIP: 6 [PH] (ref 5–8)
PLATELET # BLD AUTO: 191 K/UL (ref 150–450)
PLATELET BLD QL SMEAR: ABNORMAL
PMV BLD AUTO: 10 FL (ref 9.2–12.9)
POCT GLUCOSE: 114 MG/DL (ref 70–110)
POTASSIUM SERPL-SCNC: 4.1 MMOL/L (ref 3.5–5.1)
PROT SERPL-MCNC: 7.4 G/DL (ref 6–8.4)
PROT UR QL STRIP: ABNORMAL
RBC # BLD AUTO: 4.11 M/UL (ref 4.6–6.2)
RBC #/AREA URNS HPF: 0 /HPF (ref 0–4)
SODIUM SERPL-SCNC: 144 MMOL/L (ref 136–145)
SP GR UR STRIP: 1.01 (ref 1–1.03)
URN SPEC COLLECT METH UR: ABNORMAL
UROBILINOGEN UR STRIP-ACNC: NEGATIVE EU/DL
WBC # BLD AUTO: 7.09 K/UL (ref 3.9–12.7)
WBC #/AREA URNS HPF: 0 /HPF (ref 0–5)
YEAST URNS QL MICRO: NORMAL

## 2024-11-13 PROCEDURE — 25000003 PHARM REV CODE 250: Performed by: NURSE PRACTITIONER

## 2024-11-13 PROCEDURE — 11000001 HC ACUTE MED/SURG PRIVATE ROOM

## 2024-11-13 PROCEDURE — 76770 US EXAM ABDO BACK WALL COMP: CPT | Mod: 26,,, | Performed by: RADIOLOGY

## 2024-11-13 PROCEDURE — 81000 URINALYSIS NONAUTO W/SCOPE: CPT | Performed by: EMERGENCY MEDICINE

## 2024-11-13 PROCEDURE — 99285 EMERGENCY DEPT VISIT HI MDM: CPT | Mod: 25

## 2024-11-13 PROCEDURE — 80053 COMPREHEN METABOLIC PANEL: CPT | Performed by: EMERGENCY MEDICINE

## 2024-11-13 PROCEDURE — 21400001 HC TELEMETRY ROOM

## 2024-11-13 PROCEDURE — 85025 COMPLETE CBC W/AUTO DIFF WBC: CPT | Performed by: EMERGENCY MEDICINE

## 2024-11-13 PROCEDURE — 76770 US EXAM ABDO BACK WALL COMP: CPT | Mod: TC

## 2024-11-13 PROCEDURE — 25000003 PHARM REV CODE 250: Performed by: EMERGENCY MEDICINE

## 2024-11-13 RX ORDER — ONDANSETRON HYDROCHLORIDE 2 MG/ML
4 INJECTION, SOLUTION INTRAVENOUS EVERY 8 HOURS PRN
Status: DISCONTINUED | OUTPATIENT
Start: 2024-11-13 | End: 2024-11-15 | Stop reason: HOSPADM

## 2024-11-13 RX ORDER — PROMETHAZINE HYDROCHLORIDE 25 MG/1
25 TABLET ORAL EVERY 6 HOURS PRN
Status: DISCONTINUED | OUTPATIENT
Start: 2024-11-13 | End: 2024-11-15 | Stop reason: HOSPADM

## 2024-11-13 RX ORDER — TALC
6 POWDER (GRAM) TOPICAL NIGHTLY PRN
Status: DISCONTINUED | OUTPATIENT
Start: 2024-11-13 | End: 2024-11-15 | Stop reason: HOSPADM

## 2024-11-13 RX ORDER — SIMETHICONE 80 MG
1 TABLET,CHEWABLE ORAL 4 TIMES DAILY PRN
Status: DISCONTINUED | OUTPATIENT
Start: 2024-11-13 | End: 2024-11-15 | Stop reason: HOSPADM

## 2024-11-13 RX ORDER — ACETAMINOPHEN 650 MG/1
650 SUPPOSITORY RECTAL EVERY 6 HOURS PRN
Status: DISCONTINUED | OUTPATIENT
Start: 2024-11-13 | End: 2024-11-15 | Stop reason: HOSPADM

## 2024-11-13 RX ORDER — NALOXONE HCL 0.4 MG/ML
0.02 VIAL (ML) INJECTION
Status: DISCONTINUED | OUTPATIENT
Start: 2024-11-13 | End: 2024-11-15 | Stop reason: HOSPADM

## 2024-11-13 RX ORDER — POLYETHYLENE GLYCOL 3350 17 G/17G
17 POWDER, FOR SOLUTION ORAL DAILY PRN
Status: DISCONTINUED | OUTPATIENT
Start: 2024-11-13 | End: 2024-11-15 | Stop reason: HOSPADM

## 2024-11-13 RX ORDER — ALUMINUM HYDROXIDE, MAGNESIUM HYDROXIDE, AND SIMETHICONE 1200; 120; 1200 MG/30ML; MG/30ML; MG/30ML
30 SUSPENSION ORAL 4 TIMES DAILY PRN
Status: DISCONTINUED | OUTPATIENT
Start: 2024-11-13 | End: 2024-11-15 | Stop reason: HOSPADM

## 2024-11-13 RX ORDER — ACETAMINOPHEN 325 MG/1
650 TABLET ORAL EVERY 6 HOURS PRN
Status: DISCONTINUED | OUTPATIENT
Start: 2024-11-13 | End: 2024-11-15 | Stop reason: HOSPADM

## 2024-11-13 RX ORDER — SODIUM CHLORIDE 9 MG/ML
INJECTION, SOLUTION INTRAVENOUS CONTINUOUS
Status: DISCONTINUED | OUTPATIENT
Start: 2024-11-13 | End: 2024-11-14

## 2024-11-13 RX ORDER — IBUPROFEN 200 MG
16 TABLET ORAL
Status: DISCONTINUED | OUTPATIENT
Start: 2024-11-13 | End: 2024-11-15 | Stop reason: HOSPADM

## 2024-11-13 RX ORDER — GLUCAGON 1 MG
1 KIT INJECTION
Status: DISCONTINUED | OUTPATIENT
Start: 2024-11-13 | End: 2024-11-15 | Stop reason: HOSPADM

## 2024-11-13 RX ORDER — SODIUM CHLORIDE 0.9 % (FLUSH) 0.9 %
3 SYRINGE (ML) INJECTION EVERY 12 HOURS PRN
Status: DISCONTINUED | OUTPATIENT
Start: 2024-11-13 | End: 2024-11-15 | Stop reason: HOSPADM

## 2024-11-13 RX ORDER — ENOXAPARIN SODIUM 100 MG/ML
40 INJECTION SUBCUTANEOUS EVERY 24 HOURS
Status: DISCONTINUED | OUTPATIENT
Start: 2024-11-14 | End: 2024-11-13

## 2024-11-13 RX ORDER — IBUPROFEN 200 MG
24 TABLET ORAL
Status: DISCONTINUED | OUTPATIENT
Start: 2024-11-13 | End: 2024-11-15 | Stop reason: HOSPADM

## 2024-11-13 RX ADMIN — SODIUM CHLORIDE 1000 ML: 9 INJECTION, SOLUTION INTRAVENOUS at 09:11

## 2024-11-13 RX ADMIN — SODIUM CHLORIDE 1000 ML: 9 INJECTION, SOLUTION INTRAVENOUS at 07:11

## 2024-11-13 RX ADMIN — SODIUM CHLORIDE: 9 INJECTION, SOLUTION INTRAVENOUS at 10:11

## 2024-11-13 NOTE — ED PROVIDER NOTES
SCRIBE #1 NOTE: I, Jas Carolina, am scribing for, and in the presence of, Ashwin Benavidez Jr., MD. I have scribed the entire note.       History     Chief Complaint   Patient presents with    Abnormal Labs     Pt. Was called by his Dr and told to come to the ER for abnormal kidney function labs. He has no symptoms or complaints at this time.      Review of patient's allergies indicates:   Allergen Reactions    Protamine Hives     Urticaria, possible upper airway swelling         History of Present Illness     HPI    11/13/2024, 5:34 PM  History obtained from the patient      History of Present Illness: Crow Green is a 69 y.o. male patient with a PMHx of HTN, depression, asthma, AFIB, CHF, chronic obstructive pulmonary disease, MI, DM type II, carotid artery stenosis, and elevated coronary artery calcium who presents to the Emergency Department for evaluation of abnormal kidney labs from routine check-up. Pt denies any recent heat exposure. Pt denies usage of any medications besides Tylenol. Patient denies any abd pain, CP, SOB, dizziness, and all other sxs at this time. No prior Tx. No further complaints or concerns at this time.       Arrival mode: Ambulance Services    PCP: Cm Polanco MD        Past Medical History:  Past Medical History:   Diagnosis Date    Arthritis     Asthma     Atrial fibrillation     Atrial fibrillation with RVR 08/07/2019    Carotid artery stenosis     CHF (congestive heart failure)     Chronic obstructive pulmonary disease 08/05/2020    Depression     Dermatomyositis     Diabetes mellitus, type 2 Diagnosed in 2000    Elevated coronary artery calcium score 02/14/2024    Elevated PSA     Follow up 07/30/2020    Hypertension     Myocardial infarction     2017    Sleep apnea        Past Surgical History:  Past Surgical History:   Procedure Laterality Date    ABLATION OF ARRHYTHMOGENIC FOCUS FOR ATRIAL FIBRILLATION N/A 2/27/2020    Procedure: Ablation atrial fibrillation;   Surgeon: Gio Brown MD;  Location: [facility] EP LAB;  Service: Cardiology;  Laterality: N/A;  afib, DAMIEN (cx if SR), PVI, PITO, anes, MB, 3 Prep    CHOLECYSTECTOMY      CLOSURE OF WOUND Left 7/1/2020    Procedure: CLOSURE, WOUND;  Surgeon: Barb Veras DPM;  Location: [facility];  Service: Podiatry;  Laterality: Left;    COLONOSCOPY N/A 3/4/2022    Procedure: COLONOSCOPY;  Surgeon: Yolis Freitas MD;  Location: [facility];  Service: Endoscopy;  Laterality: N/A;    ESOPHAGOGASTRODUODENOSCOPY N/A 3/4/2022    Procedure: EGD (ESOPHAGOGASTRODUODENOSCOPY);  Surgeon: Yolis Freitas MD;  Location: [facility];  Service: Endoscopy;  Laterality: N/A;    INJECTION OF ANESTHETIC AGENT INTO SACROILIAC JOINT Left 3/24/2021    Procedure: Left BLOCK, SACROILIAC JOINT and bilateral rhomboid TPI;  Surgeon: Dillan Tsai MD;  Location: [facility];  Service: Pain Management;  Laterality: Left;    INTRALUMINAL GASTROINTESTINAL TRACT IMAGING VIA CAPSULE N/A 3/22/2022    Procedure: IMAGING PROCEDURE, GI TRACT, INTRALUMINAL, VIA CAPSULE;  Surgeon: Justice Martinez RN;  Location: [facility];  Service: Endoscopy;  Laterality: N/A;    REVERSE TOTAL SHOULDER ARTHROPLASTY Left 1/10/2022    Procedure: ARTHROPLASTY, SHOULDER, TOTAL, REVERSE;  Surgeon: Anthony Alvarado MD;  Location: [facility];  Service: Orthopedics;  Laterality: Left;  left reverse total shoulder arthroplasty     SELECTIVE INJECTION OF ANESTHETIC AGENT AROUND LUMBAR SPINAL NERVE ROOT BY TRANSFORAMINAL APPROACH Left 6/11/2020    Procedure: BLOCK, SPINAL NERVE ROOT, LUMBAR, SELECTIVE, TRANSFORAMINAL APPROACH Left L4-5, L5-S1 TFESI with RN IV sedation;  Surgeon: Dillan Tsai MD;  Location: [facility];  Service: Pain Management;  Laterality: Left;    TOE AMPUTATION Left 6/29/2020    Procedure: AMPUTATION, TOE;  Surgeon: Barb Veras DPM;  Location: [facility];  Service: Podiatry;  Laterality: Left;  great toe         Family History:  Family History   Problem Relation  Name Age of Onset    Hypertension Mother      Glaucoma Mother      Cataracts Mother      Diabetes Mother      Cataracts Father      Stroke Father      Glaucoma Sister 4         x3    Cataracts Sister 4         x3    Diabetes Sister 4         x1    Stroke Sister 4         x1    No Known Problems Brother 2     No Known Problems Daughter 2     No Known Problems Son 3     Glaucoma Maternal Aunt      Diabetes Maternal Aunt      Cancer Maternal Grandfather          lung (smoker)    Prostate cancer Neg Hx      Heart disease Neg Hx      Kidney disease Neg Hx         Social History:  Social History     Tobacco Use    Smoking status: Never    Smokeless tobacco: Never   Substance and Sexual Activity    Alcohol use: Yes     Comment: rare, maybe holidays    Drug use: Not Currently     Types: Cocaine    Sexual activity: Not Currently     Partners: Female        Review of Systems     Review of Systems   Constitutional:  Negative for diaphoresis and fever.   HENT:  Negative for congestion.    Respiratory:  Negative for cough and shortness of breath.    Cardiovascular:  Negative for chest pain.   Gastrointestinal:  Negative for abdominal pain, nausea and vomiting.   Genitourinary:  Negative for dysuria.   Musculoskeletal:  Negative for back pain.   Skin:  Negative for rash.   Neurological:  Negative for dizziness, weakness, light-headedness and headaches.   Hematological:  Does not bruise/bleed easily.   All other systems reviewed and are negative.       Physical Exam     Initial Vitals [11/13/24 1519]   BP Pulse Resp Temp SpO2   (!) 147/70 86 18 98.6 °F (37 °C) 98 %      MAP       --          Physical Exam  Nursing Notes and Vital Signs Reviewed.  Constitutional: Patient is in no acute distress. Well-developed and well-nourished.  Head: Atraumatic. Normocephalic.  Eyes:  EOM intact.  No scleral icterus.  ENT: Mucous membranes are moist.  Nares clear   Neck:  Full ROM. No JVD.  Cardiovascular: Regular rate. Regular rhythm No  murmurs, rubs, or gallops. Distal pulses are 2+ and symmetric  Pulmonary/Chest: No respiratory distress. Clear to auscultation bilaterally. No wheezing or rales.  Equal chest wall rise bilaterally  Abdominal: Soft and non-distended.  There is no tenderness.  No rebound, guarding, or rigidity. Good bowel sounds.  Genitourinary: No CVA tenderness.  No suprapubic tenderness  Musculoskeletal: Moves all extremities. No obvious deformities.  5 x 5 strength in all extremities   Skin: Warm and dry.  Neurological:  Alert, awake, and appropriate.  Normal speech.  No acute focal neurological deficits are appreciated.  Two through 12 intact bilaterally.  Psychiatric: Normal affect. Good eye contact. Appropriate in content.      ED Course   Critical Care    Date/Time: 11/13/2024 7:52 PM    Performed by: Ashwin Benavidez Jr., MD  Authorized by: Ashwin Benavidez Jr., MD  Direct patient critical care time: 8 minutes  Additional history critical care time: 6 minutes  Ordering / reviewing critical care time: 8 minutes  Documentation critical care time: 10 minutes  Consulting other physicians critical care time: 5 minutes  Consult with family critical care time: 5 minutes  Total critical care time (exclusive of procedural time) : 42 minutes  Critical care time was exclusive of teaching time and separately billable procedures and treating other patients.  Critical care was necessary to treat or prevent imminent or life-threatening deterioration of the following conditions: renal failure (Acute renal failure).  Critical care was time spent personally by me on the following activities: blood draw for specimens, development of treatment plan with patient or surrogate, discussions with consultants, interpretation of cardiac output measurements, examination of patient, evaluation of patient's response to treatment, obtaining history from patient or surrogate, ordering and performing treatments and interventions, ordering and review of  laboratory studies, ordering and review of radiographic studies, pulse oximetry, re-evaluation of patient's condition and review of old charts.        ED Vital Signs:  Vitals:    11/13/24 1519 11/13/24 1855 11/13/24 1902 11/13/24 1942   BP: (!) 147/70  (!) 155/76    Pulse: 86  68 70   Resp: 18      Temp: 98.6 °F (37 °C)      TempSrc: Oral      SpO2: 98%  97% 98%   Weight:  83.5 kg (184 lb)         Abnormal Lab Results:  Labs Reviewed   CBC W/ AUTO DIFFERENTIAL - Abnormal       Result Value    WBC 7.09      RBC 4.11 (*)     Hemoglobin 11.9 (*)     Hematocrit 36.9 (*)     MCV 90      MCH 29.0      MCHC 32.2      RDW 14.6 (*)     Platelets 191      MPV 10.0      Immature Granulocytes 0.4      Gran # (ANC) 4.1      Immature Grans (Abs) 0.03      Lymph # 2.1      Mono # 0.7      Eos # 0.2      Baso # 0.02      nRBC 0      Gran % 57.6      Lymph % 29.8      Mono % 9.2      Eosinophil % 2.7      Basophil % 0.3      Platelet Estimate Appears normal      Differential Method Automated     COMPREHENSIVE METABOLIC PANEL - Abnormal    Sodium 144      Potassium 4.1      Chloride 107      CO2 27      Glucose 98      BUN 33 (*)     Creatinine 2.1 (*)     Calcium 8.7      Total Protein 7.4      Albumin 3.3 (*)     Total Bilirubin 0.2      Alkaline Phosphatase 94      AST 16      ALT 15      eGFR 34 (*)     Anion Gap 10     URINALYSIS, REFLEX TO URINE CULTURE - Abnormal    Specimen UA Urine, Clean Catch      Color, UA Yellow      Appearance, UA Clear      pH, UA 6.0      Specific Gravity, UA 1.010      Protein, UA 1+ (*)     Glucose, UA 4+ (*)     Ketones, UA Negative      Bilirubin (UA) Negative      Occult Blood UA Negative      Nitrite, UA Negative      Urobilinogen, UA Negative      Leukocytes, UA Negative      Narrative:     Specimen Source->Urine   URINALYSIS MICROSCOPIC    RBC, UA 0      WBC, UA 0      Bacteria None      Yeast, UA None      Hyaline Casts, UA 0      Microscopic Comment SEE COMMENT      Narrative:     Specimen  Source->Urine        Imaging Results:  Imaging Results    None                 The Emergency Provider reviewed the vital signs and test results, which are outlined above.     ED Discussion       7:54 PM: Discussed case with Eduardo Vargas MD (Hospital Medicine). Dr. Vargas agrees with current care and management of pt and accepts admission.   Admitting Service:   Admitting Physician: Dr. Vargas  Admit to: Inpatient Med Tele    7:55 PM: Re-evaluated pt. I have discussed test results, shared treatment plan, and the need for admission with patient and family at bedside. Pt and family express understanding at this time and agree with all information. All questions answered. Pt and family have no further questions or concerns at this time. Pt is ready for admit.      ED Course as of 11/13/24 1959 Wed Nov 13, 2024 1850 Cardiac monitor interpretation  Independent interpretation  Indication:  Renal failure  Normal sinus rhythm.  Rate 68.  No STEMI [RT]      ED Course User Index  [RT] Ashwin Benavidez Jr., MD     Medical Decision Making  Differential diagnosis: Acute renal failure, chronic renal failure, medication side effect, dehydration    Patient was evaluated history physical examination along with chart review.  Patient was had renal failure as an outpatient prompting referral to the ED.  He was initial creatinine was 2.5.  Is 2.1 today.  He was receiving IV fluids being admitted to Hospital Medicine further evaluation and treatment    Amount and/or Complexity of Data Reviewed  External Data Reviewed: labs and notes.  Labs: ordered. Decision-making details documented in ED Course.     Details: UA shows 4+ glucose 1+ protein.  No infection.  Patient was CMP shows a glucose of only 98 however.  He was BUN is 33 and creatinine 2.1 and a GFR 34.  LFTs electrolytes are negative.  Has a normal white count and hemoglobin 11.9  Discussion of management or test interpretation with external provider(s): Discussed case  with hospital medicine graciously accepts for admission    Risk  OTC drugs.  Prescription drug management.  Decision regarding hospitalization.    Critical Care  Total time providing critical care: 42 minutes                ED Medication(s):  Medications   sodium chloride 0.9% bolus 1,000 mL 1,000 mL (1,000 mLs Intravenous New Bag 11/13/24 1935)       New Prescriptions    No medications on file               Scribe Attestation:   Scribe #1: I performed the above scribed service and the documentation accurately describes the services I performed. I attest to the accuracy of the note.     Attending:   Physician Attestation Statement for Scribe #1: I, Ashwin Benavidez Jr., MD, personally performed the services described in this documentation, as scribed by Jas Carolina, in my presence, and it is both accurate and complete.           Clinical Impression       ICD-10-CM ICD-9-CM   1. Acute renal failure, unspecified acute renal failure type  N17.9 584.9       Disposition:   Disposition: Admitted  Condition: Stable        Ashwin Benavidez Jr., MD  11/13/24 1959

## 2024-11-13 NOTE — TELEPHONE ENCOUNTER
Spoke with the patient concerning his call request. The patient stated that he is at the [facility] ER. The patient was informed that Dr Polanco stated his kidney function still decreased.  Recommend go to ED to have rechecked and evaluated. The patient was informed that Dr Polanco message is in his chart for review.

## 2024-11-13 NOTE — TELEPHONE ENCOUNTER
----- Message from Nina sent at 11/13/2024  2:51 PM CST -----  Contact: Crow Wright is calling in regards to him in the Ambulance on his way to the hospital on [address].please call back at .[phone]       Thanks  AALIYAH

## 2024-11-14 LAB
ALBUMIN SERPL BCP-MCNC: 2.8 G/DL (ref 3.5–5.2)
ALP SERPL-CCNC: 75 U/L (ref 40–150)
ALT SERPL W/O P-5'-P-CCNC: 13 U/L (ref 10–44)
ANION GAP SERPL CALC-SCNC: 7 MMOL/L (ref 8–16)
AORTIC ROOT ANNULUS: 3.46 CM
ASCENDING AORTA: 3.28 CM
AST SERPL-CCNC: 16 U/L (ref 10–40)
AV INDEX (PROSTH): 0.55
AV MEAN GRADIENT: 5.9 MMHG
AV PEAK GRADIENT: 10.2 MMHG
AV VALVE AREA BY VELOCITY RATIO: 1.9 CM²
AV VALVE AREA: 1.9 CM²
AV VELOCITY RATIO: 0.56
BASOPHILS # BLD AUTO: 0.02 K/UL (ref 0–0.2)
BASOPHILS NFR BLD: 0.3 % (ref 0–1.9)
BILIRUB SERPL-MCNC: 0.2 MG/DL (ref 0.1–1)
BSA FOR ECHO PROCEDURE: 1.99 M2
BUN SERPL-MCNC: 31 MG/DL (ref 8–23)
CALCIUM SERPL-MCNC: 7.8 MG/DL (ref 8.7–10.5)
CHLORIDE SERPL-SCNC: 112 MMOL/L (ref 95–110)
CO2 SERPL-SCNC: 23 MMOL/L (ref 23–29)
CREAT SERPL-MCNC: 1.8 MG/DL (ref 0.5–1.4)
CV ECHO LV RWT: 0.71 CM
DIFFERENTIAL METHOD BLD: ABNORMAL
DOP CALC AO PEAK VEL: 1.6 M/S
DOP CALC AO VTI: 33.9 CM
DOP CALC LVOT AREA: 3.5 CM2
DOP CALC LVOT DIAMETER: 2.1 CM
DOP CALC LVOT PEAK VEL: 0.9 M/S
DOP CALC LVOT STROKE VOLUME: 65.1 CM3
DOP CALC MV VTI: 33 CM
DOP CALC RVOT PEAK VEL: 1.07 M/S
DOP CALC RVOT VTI: 21.3 CM
DOP CALCLVOT PEAK VEL VTI: 18.8 CM
E WAVE DECELERATION TIME: 256.48 MSEC
E/A RATIO: 1.51
E/E' RATIO: 16.14 M/S
ECHO LV POSTERIOR WALL: 1.6 CM (ref 0.6–1.1)
EOSINOPHIL # BLD AUTO: 0.1 K/UL (ref 0–0.5)
EOSINOPHIL NFR BLD: 2.4 % (ref 0–8)
ERYTHROCYTE [DISTWIDTH] IN BLOOD BY AUTOMATED COUNT: 14.7 % (ref 11.5–14.5)
EST. GFR  (NO RACE VARIABLE): 41 ML/MIN/1.73 M^2
FRACTIONAL SHORTENING: 26.7 % (ref 28–44)
GLUCOSE SERPL-MCNC: 105 MG/DL (ref 70–110)
HCT VFR BLD AUTO: 35.1 % (ref 40–54)
HGB BLD-MCNC: 10.7 G/DL (ref 14–18)
IMM GRANULOCYTES # BLD AUTO: 0.02 K/UL (ref 0–0.04)
IMM GRANULOCYTES NFR BLD AUTO: 0.3 % (ref 0–0.5)
INTERVENTRICULAR SEPTUM: 1.6 CM (ref 0.6–1.1)
IVC DIAMETER: 1.77 CM
IVRT: 57.09 MSEC
LA MAJOR: 5.25 CM
LA MINOR: 5.22 CM
LA WIDTH: 3 CM
LEFT ATRIUM SIZE: 4.37 CM
LEFT ATRIUM VOLUME INDEX: 30.1 ML/M2
LEFT ATRIUM VOLUME: 58.34 CM3
LEFT INTERNAL DIMENSION IN SYSTOLE: 3.3 CM (ref 2.1–4)
LEFT VENTRICLE DIASTOLIC VOLUME INDEX: 46.98 ML/M2
LEFT VENTRICLE DIASTOLIC VOLUME: 91.14 ML
LEFT VENTRICLE MASS INDEX: 157 G/M2
LEFT VENTRICLE SYSTOLIC VOLUME INDEX: 22 ML/M2
LEFT VENTRICLE SYSTOLIC VOLUME: 42.67 ML
LEFT VENTRICULAR INTERNAL DIMENSION IN DIASTOLE: 4.5 CM (ref 3.5–6)
LEFT VENTRICULAR MASS: 304.6 G
LV LATERAL E/E' RATIO: 16.14 M/S
LV SEPTAL E/E' RATIO: 16.14 M/S
LVED V (TEICH): 91.14 ML
LVES V (TEICH): 42.67 ML
LVOT MG: 2.14 MMHG
LVOT MV: 0.73 CM/S
LYMPHOCYTES # BLD AUTO: 2 K/UL (ref 1–4.8)
LYMPHOCYTES NFR BLD: 33.4 % (ref 18–48)
MAGNESIUM SERPL-MCNC: 2.3 MG/DL (ref 1.6–2.6)
MCH RBC QN AUTO: 28.2 PG (ref 27–31)
MCHC RBC AUTO-ENTMCNC: 30.5 G/DL (ref 32–36)
MCV RBC AUTO: 93 FL (ref 82–98)
MONOCYTES # BLD AUTO: 0.5 K/UL (ref 0.3–1)
MONOCYTES NFR BLD: 8.5 % (ref 4–15)
MV MEAN GRADIENT: 2 MMHG
MV PEAK A VEL: 0.75 M/S
MV PEAK E VEL: 1.13 M/S
MV PEAK GRADIENT: 5 MMHG
MV STENOSIS PRESSURE HALF TIME: 74.38 MS
MV VALVE AREA BY CONTINUITY EQUATION: 1.97 CM2
MV VALVE AREA P 1/2 METHOD: 2.96 CM2
NEUTROPHILS # BLD AUTO: 3.3 K/UL (ref 1.8–7.7)
NEUTROPHILS NFR BLD: 55.1 % (ref 38–73)
NRBC BLD-RTO: 0 /100 WBC
OHS QRS DURATION: 126 MS
OHS QTC CALCULATION: 546 MS
PHOSPHATE SERPL-MCNC: 3 MG/DL (ref 2.7–4.5)
PLATELET # BLD AUTO: 172 K/UL (ref 150–450)
PMV BLD AUTO: 9.9 FL (ref 9.2–12.9)
POTASSIUM SERPL-SCNC: 4.1 MMOL/L (ref 3.5–5.1)
PROT SERPL-MCNC: 6.2 G/DL (ref 6–8.4)
PV MEAN GRADIENT: 3 MMHG
RA MAJOR: 3.57 CM
RA PRESSURE ESTIMATED: 3 MMHG
RA WIDTH: 2.6 CM
RBC # BLD AUTO: 3.79 M/UL (ref 4.6–6.2)
SODIUM SERPL-SCNC: 142 MMOL/L (ref 136–145)
STJ: 3.36 CM
TDI LATERAL: 0.07 M/S
TDI SEPTAL: 0.07 M/S
TDI: 0.07 M/S
TRICUSPID ANNULAR PLANE SYSTOLIC EXCURSION: 1.99 CM
TROPONIN I SERPL DL<=0.01 NG/ML-MCNC: 0.19 NG/ML (ref 0–0.03)
WBC # BLD AUTO: 5.9 K/UL (ref 3.9–12.7)
Z-SCORE OF LEFT VENTRICULAR DIMENSION IN END DIASTOLE: -2.03
Z-SCORE OF LEFT VENTRICULAR DIMENSION IN END SYSTOLE: -0.21

## 2024-11-14 PROCEDURE — 27000221 HC OXYGEN, UP TO 24 HOURS

## 2024-11-14 PROCEDURE — 36415 COLL VENOUS BLD VENIPUNCTURE: CPT | Performed by: NURSE PRACTITIONER

## 2024-11-14 PROCEDURE — 84100 ASSAY OF PHOSPHORUS: CPT

## 2024-11-14 PROCEDURE — 99900035 HC TECH TIME PER 15 MIN (STAT)

## 2024-11-14 PROCEDURE — 36415 COLL VENOUS BLD VENIPUNCTURE: CPT | Performed by: STUDENT IN AN ORGANIZED HEALTH CARE EDUCATION/TRAINING PROGRAM

## 2024-11-14 PROCEDURE — 21400001 HC TELEMETRY ROOM

## 2024-11-14 PROCEDURE — 93005 ELECTROCARDIOGRAM TRACING: CPT

## 2024-11-14 PROCEDURE — 80053 COMPREHEN METABOLIC PANEL: CPT | Performed by: NURSE PRACTITIONER

## 2024-11-14 PROCEDURE — 85025 COMPLETE CBC W/AUTO DIFF WBC: CPT | Performed by: NURSE PRACTITIONER

## 2024-11-14 PROCEDURE — 25000003 PHARM REV CODE 250: Performed by: NURSE PRACTITIONER

## 2024-11-14 PROCEDURE — 93010 ELECTROCARDIOGRAM REPORT: CPT | Mod: ,,, | Performed by: INTERNAL MEDICINE

## 2024-11-14 PROCEDURE — 63600175 PHARM REV CODE 636 W HCPCS: Performed by: STUDENT IN AN ORGANIZED HEALTH CARE EDUCATION/TRAINING PROGRAM

## 2024-11-14 PROCEDURE — 94761 N-INVAS EAR/PLS OXIMETRY MLT: CPT

## 2024-11-14 PROCEDURE — 83735 ASSAY OF MAGNESIUM: CPT

## 2024-11-14 PROCEDURE — 84484 ASSAY OF TROPONIN QUANT: CPT | Performed by: STUDENT IN AN ORGANIZED HEALTH CARE EDUCATION/TRAINING PROGRAM

## 2024-11-14 PROCEDURE — 94640 AIRWAY INHALATION TREATMENT: CPT

## 2024-11-14 PROCEDURE — 25000242 PHARM REV CODE 250 ALT 637 W/ HCPCS: Performed by: STUDENT IN AN ORGANIZED HEALTH CARE EDUCATION/TRAINING PROGRAM

## 2024-11-14 RX ORDER — ATORVASTATIN CALCIUM 40 MG/1
40 TABLET, FILM COATED ORAL NIGHTLY
Status: DISCONTINUED | OUTPATIENT
Start: 2024-11-14 | End: 2024-11-15 | Stop reason: HOSPADM

## 2024-11-14 RX ORDER — FUROSEMIDE 10 MG/ML
40 INJECTION INTRAMUSCULAR; INTRAVENOUS ONCE
Status: COMPLETED | OUTPATIENT
Start: 2024-11-14 | End: 2024-11-14

## 2024-11-14 RX ORDER — TRAZODONE HYDROCHLORIDE 100 MG/1
200 TABLET ORAL NIGHTLY
Status: DISCONTINUED | OUTPATIENT
Start: 2024-11-14 | End: 2024-11-15 | Stop reason: HOSPADM

## 2024-11-14 RX ORDER — SERTRALINE HYDROCHLORIDE 50 MG/1
100 TABLET, FILM COATED ORAL NIGHTLY
Status: DISCONTINUED | OUTPATIENT
Start: 2024-11-14 | End: 2024-11-15 | Stop reason: HOSPADM

## 2024-11-14 RX ORDER — MORPHINE SULFATE 2 MG/ML
2 INJECTION, SOLUTION INTRAMUSCULAR; INTRAVENOUS ONCE
Status: COMPLETED | OUTPATIENT
Start: 2024-11-14 | End: 2024-11-14

## 2024-11-14 RX ORDER — PANTOPRAZOLE SODIUM 40 MG/1
40 TABLET, DELAYED RELEASE ORAL DAILY
Status: DISCONTINUED | OUTPATIENT
Start: 2024-11-14 | End: 2024-11-15 | Stop reason: HOSPADM

## 2024-11-14 RX ORDER — IPRATROPIUM BROMIDE AND ALBUTEROL SULFATE 2.5; .5 MG/3ML; MG/3ML
3 SOLUTION RESPIRATORY (INHALATION) ONCE
Status: COMPLETED | OUTPATIENT
Start: 2024-11-14 | End: 2024-11-14

## 2024-11-14 RX ORDER — GABAPENTIN 300 MG/1
300 CAPSULE ORAL 3 TIMES DAILY
Status: DISCONTINUED | OUTPATIENT
Start: 2024-11-14 | End: 2024-11-15 | Stop reason: HOSPADM

## 2024-11-14 RX ADMIN — IPRATROPIUM BROMIDE AND ALBUTEROL SULFATE 3 ML: 2.5; .5 SOLUTION RESPIRATORY (INHALATION) at 12:11

## 2024-11-14 RX ADMIN — TRAZODONE HYDROCHLORIDE 200 MG: 100 TABLET ORAL at 08:11

## 2024-11-14 RX ADMIN — PANTOPRAZOLE SODIUM 40 MG: 40 TABLET, DELAYED RELEASE ORAL at 11:11

## 2024-11-14 RX ADMIN — APIXABAN 5 MG: 2.5 TABLET, FILM COATED ORAL at 08:11

## 2024-11-14 RX ADMIN — ACETAMINOPHEN 650 MG: 325 TABLET ORAL at 08:11

## 2024-11-14 RX ADMIN — SERTRALINE HYDROCHLORIDE 100 MG: 50 TABLET ORAL at 08:11

## 2024-11-14 RX ADMIN — ATORVASTATIN CALCIUM 40 MG: 40 TABLET, FILM COATED ORAL at 01:11

## 2024-11-14 RX ADMIN — MORPHINE SULFATE 2 MG: 2 INJECTION, SOLUTION INTRAMUSCULAR; INTRAVENOUS at 12:11

## 2024-11-14 RX ADMIN — SERTRALINE HYDROCHLORIDE 100 MG: 50 TABLET ORAL at 01:11

## 2024-11-14 RX ADMIN — FUROSEMIDE 40 MG: 10 INJECTION, SOLUTION INTRAMUSCULAR; INTRAVENOUS at 08:11

## 2024-11-14 RX ADMIN — ATORVASTATIN CALCIUM 40 MG: 40 TABLET, FILM COATED ORAL at 08:11

## 2024-11-14 RX ADMIN — GABAPENTIN 300 MG: 300 CAPSULE ORAL at 11:11

## 2024-11-14 RX ADMIN — GABAPENTIN 300 MG: 300 CAPSULE ORAL at 08:11

## 2024-11-14 RX ADMIN — APIXABAN 5 MG: 2.5 TABLET, FILM COATED ORAL at 11:11

## 2024-11-14 RX ADMIN — FUROSEMIDE 40 MG: 10 INJECTION, SOLUTION INTRAMUSCULAR; INTRAVENOUS at 12:11

## 2024-11-14 RX ADMIN — TRAZODONE HYDROCHLORIDE 200 MG: 100 TABLET ORAL at 01:11

## 2024-11-14 RX ADMIN — GABAPENTIN 300 MG: 300 CAPSULE ORAL at 02:11

## 2024-11-14 NOTE — HOSPITAL COURSE
69 y.o male with a PMH of arthritis, asthma, atrial fibrillation with RVR, carotid artery stenosis, CHF, COPD, depression, dermatomyositis, type 2 DM, elevated PSA, HTN, MI, and sleep apnea admitted for treatment of acute kidney injury. Patient was referred to the ED by his PCP after abnormal routine lab work. Afebrile overnight, hemodynamically stable. CBC unremarkable and BUN/Cr 28/1.7, which has improved since admissions. Patient denies dysuria, flank pain, hematuria, difficulty urinating, fever, chills, CP, nausea, vomiting, or lower abdominal pain. Discussed discharge planning with patient yesterday and he was agreeable, however, prior to discharge yesterday, patient reported crushing sternal chest pain and shortness of breath.  Oxygen saturations dropped high 80s while one room air, but increased to 92% on 4 L NC.  Patient received once dose IV morphine and IV lasix 40 mg . STAT chest x-ray showed mild to moderate vascular congestion, troponin 0.190 and EKG showed 1st degree AV block with elevated QTC and wide QRS. Patient was weaned down to 2 L at 98% saturation. Cardiology consulted. D-dimer within normal limits. V/Q scan low probability for PE.

## 2024-11-14 NOTE — SUBJECTIVE & OBJECTIVE
Past Medical History:   Diagnosis Date    Arthritis     Asthma     Atrial fibrillation     Atrial fibrillation with RVR 08/07/2019    Carotid artery stenosis     CHF (congestive heart failure)     Chronic obstructive pulmonary disease 08/05/2020    Depression     Dermatomyositis     Diabetes mellitus, type 2 Diagnosed in 2000    Elevated coronary artery calcium score 02/14/2024    Elevated PSA     Follow up 07/30/2020    Hypertension     Myocardial infarction     2017    Sleep apnea        Past Surgical History:   Procedure Laterality Date    ABLATION OF ARRHYTHMOGENIC FOCUS FOR ATRIAL FIBRILLATION N/A 2/27/2020    Procedure: Ablation atrial fibrillation;  Surgeon: Gio Brown MD;  Location: [facility] EP LAB;  Service: Cardiology;  Laterality: N/A;  afib, DAMIEN (cx if SR), PVI, PITO, anes, MB, 3 Prep    CHOLECYSTECTOMY      CLOSURE OF WOUND Left 7/1/2020    Procedure: CLOSURE, WOUND;  Surgeon: Barb Veras DPM;  Location: [facility] OR;  Service: Podiatry;  Laterality: Left;    COLONOSCOPY N/A 3/4/2022    Procedure: COLONOSCOPY;  Surgeon: Yolis Freitas MD;  Location: [facility] ENDO;  Service: Endoscopy;  Laterality: N/A;    ESOPHAGOGASTRODUODENOSCOPY N/A 3/4/2022    Procedure: EGD (ESOPHAGOGASTRODUODENOSCOPY);  Surgeon: Yolis Freitas MD;  Location: [facility] ENDO;  Service: Endoscopy;  Laterality: N/A;    INJECTION OF ANESTHETIC AGENT INTO SACROILIAC JOINT Left 3/24/2021    Procedure: Left BLOCK, SACROILIAC JOINT and bilateral rhomboid TPI;  Surgeon: Dillan Tsai MD;  Location: [facility] PAIN MGT;  Service: Pain Management;  Laterality: Left;    INTRALUMINAL GASTROINTESTINAL TRACT IMAGING VIA CAPSULE N/A 3/22/2022    Procedure: IMAGING PROCEDURE, GI TRACT, INTRALUMINAL, VIA CAPSULE;  Surgeon: Justice Martinez RN;  Location: [facility] ENDO;  Service: Endoscopy;  Laterality: N/A;    REVERSE TOTAL SHOULDER ARTHROPLASTY Left 1/10/2022    Procedure: ARTHROPLASTY, SHOULDER, TOTAL, REVERSE;  Surgeon: Anthony Alvarado MD;   Location: [facility] OR;  Service: Orthopedics;  Laterality: Left;  left reverse total shoulder arthroplasty     SELECTIVE INJECTION OF ANESTHETIC AGENT AROUND LUMBAR SPINAL NERVE ROOT BY TRANSFORAMINAL APPROACH Left 6/11/2020    Procedure: BLOCK, SPINAL NERVE ROOT, LUMBAR, SELECTIVE, TRANSFORAMINAL APPROACH Left L4-5, L5-S1 TFESI with RN IV sedation;  Surgeon: Dillan Tsai MD;  Location: [facility] PAIN T;  Service: Pain Management;  Laterality: Left;    TOE AMPUTATION Left 6/29/2020    Procedure: AMPUTATION, TOE;  Surgeon: Barb Veras DPM;  Location: [facility] OR;  Service: Podiatry;  Laterality: Left;  great toe       Review of patient's allergies indicates:   Allergen Reactions    Protamine Hives     Urticaria, possible upper airway swelling       Current Facility-Administered Medications on File Prior to Encounter   Medication    omalizumab injection 300 mg    omalizumab injection 75 mg     Current Outpatient Medications on File Prior to Encounter   Medication Sig    albuterol (PROVENTIL) 2.5 mg /3 mL (0.083 %) nebulizer solution INHALE THE CONTENTS OF 1 VIAL VIA NEBULIZER EVERY 4 TO 6 HOURS AS NEEDED FOR WHEEZING OR FOR SHORTNESS OF BREATH    albuterol sulfate 90 mcg/actuation aebs Inhale 2 puffs into the lungs every 4 (four) hours as needed (WHEEZING OR SHORTNESS OF BREATH).    allopurinoL (ZYLOPRIM) 100 MG tablet TAKE 1 TABLET ONE TIME DAILY    amLODIPine (NORVASC) 2.5 MG tablet Take 1 tablet (2.5 mg total) by mouth once daily.    atorvastatin (LIPITOR) 40 MG tablet Take 1 tablet (40 mg total) by mouth every evening.    blood sugar diagnostic (TRUE METRIX GLUCOSE TEST STRIP) Strp Inject 1 each into the skin 4 (four) times daily.    blood-glucose meter Misc Use as instructed to test blood glucose meter daily.    brinzolamide-brimonidine (SIMBRINZA) 1-0.2 % DrpS Place 1 drop into both eyes 3 (three) times daily.    cyanocobalamin (VITAMIN B-12) 1000 MCG tablet Take 1 tablet by mouth once daily.    DEXCOM G7 SENSOR Shefali  "Change sensor every 10 days    ELIQUIS 5 mg Tab TAKE 1 TABLET TWICE DAILY    empagliflozin (JARDIANCE) 25 mg tablet Take 1 tablet (25 mg total) by mouth once daily.    EPINEPHrine (EPIPEN) 0.3 mg/0.3 mL AtIn Inject 0.3 mLs (0.3 mg total) into the muscle once. for 1 dose    furosemide (LASIX) 40 MG tablet Take 40 mg by mouth 2 (two) times daily. 2.5 tablets twice daily    gabapentin (NEURONTIN) 800 MG tablet Take 1 tablet (800 mg total) by mouth 3 (three) times daily.    INV allopurinol tablet Take 1 tablet by mouth once daily.    lancets (TRUEPLUS LANCETS) 33 gauge Misc Use to check blood sugar twice a day    lancets (TRUEPLUS LANCETS) 33 gauge Misc USE AS DIRECTED TO TEST BLOOD SUGAR FOUR TIMES DAILY    latanoprost 0.005 % ophthalmic solution Place 1 drop into both eyes every evening.    lisinopriL (PRINIVIL,ZESTRIL) 30 MG tablet TAKE 1 TABLET EVERY DAY. (Patient not taking: Reported on 11/12/2024)    lisinopriL 10 MG tablet Take 10 mg by mouth.    methocarbamoL (ROBAXIN) 750 MG Tab Take 750 mg by mouth 2 (two) times daily as needed.    multivitamin-iron-folic acid (SENTRY) Tab Take 1 tablet by mouth once daily.    omalizumab (XOLAIR) 150 mg/mL injection Inject 2 mLs (300 mg total) into the skin every 14 (fourteen) days.    omalizumab (XOLAIR) 75 mg/0.5 mL injection Inject 0.5 mLs (75 mg total) into the skin every 14 (fourteen) days.    pantoprazole (PROTONIX) 40 MG tablet Take 1 tablet (40 mg total) by mouth once daily.    pen needle, diabetic (BD ULTRA-FINE SHORT PEN NEEDLE) 31 gauge x 5/16" Ndle 1 each by Misc.(Non-Drug; Combo Route) route 3 (three) times daily.    pep injection Inject 0.15 ml as directed     For compounding pharmacy use:   Add PAPAVERINE 30 mg  Add PHENTOLAMINE 1 mg  Add ALPROSTADIL 20 mcg    pregabalin (LYRICA) 100 MG capsule Take 1 capsule (100 mg total) by mouth 3 (three) times daily.    ranolazine (RANEXA) 1,000 mg Tb12 TAKE 1 TABLET TWICE DAILY    semaglutide (OZEMPIC) 2 mg/dose (8 mg/3 " mL) Brian Inject 2 mg into the skin every 7 days.    sertraline (ZOLOFT) 100 MG tablet Take 1 tablet (100 mg total) by mouth every evening.    topiramate (TOPAMAX) 25 MG tablet Take 1 tablet (25 mg total) by mouth 2 (two) times daily.    traMADoL (ULTRAM) 50 mg tablet Take 50 mg by mouth every 6 (six) hours as needed.    traZODone (DESYREL) 100 MG tablet TAKE 2 TABLETS EVERY EVENING    VIOS AEROSOL DELIVERY SYSTEM Shefali USE AS DIRESTED    [DISCONTINUED] diclofenac sodium (VOLTAREN) 1 % Gel APPLY 2 GRAMS TOPICALLY FOUR TIMES DAILY (Patient not taking: Reported on 10/11/2024)     Family History       Problem Relation (Age of Onset)    Cancer Maternal Grandfather    Cataracts Mother, Father, Sister    Diabetes Mother, Sister, Maternal Aunt    Glaucoma Mother, Sister, Maternal Aunt    Hypertension Mother    No Known Problems Brother, Daughter, Son    Stroke Father, Sister          Tobacco Use    Smoking status: Never    Smokeless tobacco: Never   Substance and Sexual Activity    Alcohol use: Yes     Comment: rare, maybe holidays    Drug use: Not Currently     Types: Cocaine    Sexual activity: Not Currently     Partners: Female     Review of Systems   Constitutional:  Negative for chills, diaphoresis, fatigue and fever.   Respiratory:  Negative for cough and shortness of breath.    Cardiovascular:  Negative for chest pain, palpitations and leg swelling.   Gastrointestinal:  Negative for diarrhea, nausea and vomiting.   Genitourinary:  Negative for dysuria, flank pain and hematuria.   Neurological:  Negative for dizziness, weakness and light-headedness.   All other systems reviewed and are negative.    Objective:     Vital Signs (Most Recent):  Temp: 98.6 °F (37 °C) (11/13/24 1519)  Pulse: 71 (11/13/24 2003)  Resp: 18 (11/13/24 1519)  BP: (!) 162/97 (11/13/24 2003)  SpO2: 100 % (11/13/24 2003) Vital Signs (24h Range):  Temp:  [98.6 °F (37 °C)] 98.6 °F (37 °C)  Pulse:  [68-86] 71  Resp:  [18] 18  SpO2:  [97 %-100 %] 100  %  BP: (147-162)/(70-97) 162/97     Weight: 83.5 kg (184 lb)  Body mass index is 30.62 kg/m².     Physical Exam  Vitals and nursing note reviewed.   Constitutional:       General: He is awake. He is not in acute distress.     Appearance: Normal appearance. He is well-developed and well-groomed. He is not ill-appearing, toxic-appearing or diaphoretic.   HENT:      Head: Normocephalic and atraumatic.   Eyes:      Extraocular Movements: Extraocular movements intact.      Conjunctiva/sclera: Conjunctivae normal.      Pupils: Pupils are equal, round, and reactive to light.   Cardiovascular:      Rate and Rhythm: Normal rate and regular rhythm.      Pulses: Normal pulses.      Heart sounds: Normal heart sounds. No murmur heard.  Pulmonary:      Effort: Pulmonary effort is normal.      Breath sounds: Normal breath sounds. No decreased breath sounds, wheezing, rhonchi or rales.   Abdominal:      General: Bowel sounds are normal.      Palpations: Abdomen is soft.      Tenderness: There is no abdominal tenderness.   Musculoskeletal:      Cervical back: Normal range of motion and neck supple.      Comments: 5/5 strength throughout   Skin:     General: Skin is warm and dry.      Capillary Refill: Capillary refill takes less than 2 seconds.   Neurological:      General: No focal deficit present.      Mental Status: He is alert and oriented to person, place, and time. Mental status is at baseline.      GCS: GCS eye subscore is 4. GCS verbal subscore is 5. GCS motor subscore is 6.      Cranial Nerves: Cranial nerves 2-12 are intact.      Sensory: Sensation is intact.      Motor: Motor function is intact.   Psychiatric:         Mood and Affect: Mood normal.         Behavior: Behavior normal. Behavior is cooperative.              CRANIAL NERVES     CN III, IV, VI   Pupils are equal, round, and reactive to light.     LABS:  Recent Results (from the past 24 hours)   CBC auto differential    Collection Time: 11/13/24  6:54 PM   Result  Value Ref Range    WBC 7.09 3.90 - 12.70 K/uL    RBC 4.11 (L) 4.60 - 6.20 M/uL    Hemoglobin 11.9 (L) 14.0 - 18.0 g/dL    Hematocrit 36.9 (L) 40.0 - 54.0 %    MCV 90 82 - 98 fL    MCH 29.0 27.0 - 31.0 pg    MCHC 32.2 32.0 - 36.0 g/dL    RDW 14.6 (H) 11.5 - 14.5 %    Platelets 191 150 - 450 K/uL    MPV 10.0 9.2 - 12.9 fL    Immature Granulocytes 0.4 0.0 - 0.5 %    Gran # (ANC) 4.1 1.8 - 7.7 K/uL    Immature Grans (Abs) 0.03 0.00 - 0.04 K/uL    Lymph # 2.1 1.0 - 4.8 K/uL    Mono # 0.7 0.3 - 1.0 K/uL    Eos # 0.2 0.0 - 0.5 K/uL    Baso # 0.02 0.00 - 0.20 K/uL    nRBC 0 0 /100 WBC    Gran % 57.6 38.0 - 73.0 %    Lymph % 29.8 18.0 - 48.0 %    Mono % 9.2 4.0 - 15.0 %    Eosinophil % 2.7 0.0 - 8.0 %    Basophil % 0.3 0.0 - 1.9 %    Platelet Estimate Appears normal     Differential Method Automated    Comprehensive metabolic panel    Collection Time: 11/13/24  6:54 PM   Result Value Ref Range    Sodium 144 136 - 145 mmol/L    Potassium 4.1 3.5 - 5.1 mmol/L    Chloride 107 95 - 110 mmol/L    CO2 27 23 - 29 mmol/L    Glucose 98 70 - 110 mg/dL    BUN 33 (H) 8 - 23 mg/dL    Creatinine 2.1 (H) 0.5 - 1.4 mg/dL    Calcium 8.7 8.7 - 10.5 mg/dL    Total Protein 7.4 6.0 - 8.4 g/dL    Albumin 3.3 (L) 3.5 - 5.2 g/dL    Total Bilirubin 0.2 0.1 - 1.0 mg/dL    Alkaline Phosphatase 94 40 - 150 U/L    AST 16 10 - 40 U/L    ALT 15 10 - 44 U/L    eGFR 34 (A) >60 mL/min/1.73 m^2    Anion Gap 10 8 - 16 mmol/L   Urinalysis, Reflex to Urine Culture Urine, Clean Catch    Collection Time: 11/13/24  6:55 PM    Specimen: Urine, Clean Catch   Result Value Ref Range    Specimen UA Urine, Clean Catch     Color, UA Yellow Yellow, Straw, Angela    Appearance, UA Clear Clear    pH, UA 6.0 5.0 - 8.0    Specific Gravity, UA 1.010 1.005 - 1.030    Protein, UA 1+ (A) Negative    Glucose, UA 4+ (A) Negative    Ketones, UA Negative Negative    Bilirubin (UA) Negative Negative    Occult Blood UA Negative Negative    Nitrite, UA Negative Negative    Urobilinogen,  UA Negative <2.0 EU/dL    Leukocytes, UA Negative Negative   Urinalysis Microscopic    Collection Time: 11/13/24  6:55 PM   Result Value Ref Range    RBC, UA 0 0 - 4 /hpf    WBC, UA 0 0 - 5 /hpf    Bacteria None None-Occ /hpf    Yeast, UA None None    Hyaline Casts, UA 0 0-1/lpf /lpf    Microscopic Comment SEE COMMENT        RADIOLOGY  US Retroperitoneal Complete    Result Date: 11/13/2024  EXAM: US RETROPERITONEAL COMPLETE CLINICAL HISTORY: Disorder kidneys and ureters FINDINGS: The right kidney measures 11.0 cm in length. The left kidney measures 11.0 cm in length.  4 mm nonobstructing stone lower pole right kidney.  1.2 x 1.3 cm cyst upper pole left kidney.  No contour deforming mass, hydronephrosis, or perinephric fluid collection. The urinary bladder is grossly normal.      No acute findings Finalized on: 11/13/2024 11:42 AM By:  Devon Singleton MD R# [id number]      2024-11-13 11:44:[id number]    [facility]    MRI Brain Without Contrast    Result Date: 11/8/2024  EXAMINATION: MRI BRAIN WITHOUT CONTRAST CLINICAL HISTORY: Chronic migraine with aura, intractable, without status migrainosusDizziness, persistent/recurrent, cardiac or vascular cause suspected;Headache, new or worsening (Age >= 50y); TECHNIQUE: Standard multiplanar non-contrast sequences of the brain. COMPARISON: 03/02/2022 CT of the brain FINDINGS: Intracranial compartment: Ventricles and sulci are mildly enlarged in size for age without evidence of hydrocephalus. No extra-axial blood or fluid collections. There is mild to moderate scattered periventricular and subcortical white matter hyperintensity consistent with age-related microvascular ischemic change.  No mass lesion, acute hemorrhage, edema or acute infarct. Normal vascular flow voids are preserved. The diffusion-weighted sequence is negative.  No evidence of acute infarct. Skull/extracranial contents (limited evaluation): Bone marrow signal intensity is normal.     No acute or focal abnormality.  Mild  cerebral atrophy and white matter degeneration Electronically signed by: Jeremias Ochoa MD Date:    11/08/2024 Time:    11:08    Posterior Segment OCT Optic Nerve- Both eyes    Result Date: 10/22/2024  Right Eye Quality was good. Scan locations included Retinal Nerve Fiber Layer (RNFL). Left Eye Quality was good. Scan locations included Retinal Nerve Fiber Layer (RNFL). Findings Right Eye Low. TI. Yes. Progression has worsened. Left Eye Borderline. Yes. Progression has been stable. Notes GCL  27/42 - ERM OS      EKG    MICROBIOLOGY    MDM     Amount and/or Complexity of Data Reviewed  Clinical lab tests: reviewed  Tests in the radiology section of CPT®: reviewed  Tests in the medicine section of CPT®: reviewed  Discussion of test results with the performing providers: yes  Decide to obtain previous medical records or to obtain history from someone other than the patient: yes  Obtain history from someone other than the patient: yes  Review and summarize past medical records: yes  Discuss the patient with other providers: yes  Independent visualization of images, tracings, or specimens: yes

## 2024-11-14 NOTE — ASSESSMENT & PLAN NOTE
Patient's FSGs are controlled on current medication regimen.  Last A1c reviewed-   Lab Results   Component Value Date    HGBA1C 5.7 (H) 11/08/2024     Most recent fingerstick glucose reviewed-   Recent Labs   Lab 11/13/24 2129   POCTGLUCOSE 114*     Current correctional scale  Low  Maintain anti-hyperglycemic dose as follows-   Antihyperglycemics (From admission, onward)      None          Hold Oral hypoglycemics while patient is in the hospital.  SSI  Accuchecks

## 2024-11-14 NOTE — H&P
[facility]  [facility]  History & Physical    Patient Name: Crow Green  MRN: [id number]  Patient Class: IP- Inpatient  Admission Date: 11/13/2024  Attending Physician: No att. providers found   Primary Care Provider: Cm Polanco MD         Patient information was obtained from patient, past medical records, and ER records.     Subjective:     Principal Problem:JOSE MARIA (acute kidney injury)    Chief Complaint:   Chief Complaint   Patient presents with    Abnormal Labs     Pt. Was called by his Dr and told to come to the ER for abnormal kidney function labs. He has no symptoms or complaints at this time.         HPI: Crow Green is a 69 y.o. male with a PMH  has a past medical history of Arthritis, Asthma, Atrial fibrillation, Atrial fibrillation with RVR (08/07/2019), Carotid artery stenosis, CHF (congestive heart failure), Chronic obstructive pulmonary disease (08/05/2020), Depression, Dermatomyositis, Diabetes mellitus, type 2 (Diagnosed in 2000), Elevated coronary artery calcium score (02/14/2024), Elevated PSA, Follow up (07/30/2020), Hypertension, Myocardial infarction, and Sleep apnea.presented to the Emergency Department for evaluation of abnormal kidney labs from routine check-up. Pt denies any recent heat exposure. Pt denies usage of any medications (ASA/NSAIDs) besides Tylenol. Patient denies any dysuria, hematuria, abd pain, CP, SOB, dizziness, and all other sxs at this time. No prior Tx. No further complaints or concerns at this time.       ER workup revealed BUN/creatinine of 2.1/74 (previously 2.7/25 on 11/05/2024).  Baseline creatinine level 1.1.  Ultrasound of retroperitoneal negative for acute findings.  Vital signs stable.  Patient received 1 L normal saline in ED.  Hospital Medicine consulted to admit patient for JOSE MARIA.  Patient in agreement with treatment plan.  Patient will be admitted under inpatient status.    PCP: Cm Polanco      Past Medical History:    Diagnosis Date    Arthritis     Asthma     Atrial fibrillation     Atrial fibrillation with RVR 08/07/2019    Carotid artery stenosis     CHF (congestive heart failure)     Chronic obstructive pulmonary disease 08/05/2020    Depression     Dermatomyositis     Diabetes mellitus, type 2 Diagnosed in 2000    Elevated coronary artery calcium score 02/14/2024    Elevated PSA     Follow up 07/30/2020    Hypertension     Myocardial infarction     2017    Sleep apnea        Past Surgical History:   Procedure Laterality Date    ABLATION OF ARRHYTHMOGENIC FOCUS FOR ATRIAL FIBRILLATION N/A 2/27/2020    Procedure: Ablation atrial fibrillation;  Surgeon: Gio Brown MD;  Location: [facility] EP LAB;  Service: Cardiology;  Laterality: N/A;  afib, DAMIEN (cx if SR), PVI, PITO, anes, MB, 3 Prep    CHOLECYSTECTOMY      CLOSURE OF WOUND Left 7/1/2020    Procedure: CLOSURE, WOUND;  Surgeon: Barb Veras DPM;  Location: [facility] OR;  Service: Podiatry;  Laterality: Left;    COLONOSCOPY N/A 3/4/2022    Procedure: COLONOSCOPY;  Surgeon: Ylois Freitas MD;  Location: [facility] ENDO;  Service: Endoscopy;  Laterality: N/A;    ESOPHAGOGASTRODUODENOSCOPY N/A 3/4/2022    Procedure: EGD (ESOPHAGOGASTRODUODENOSCOPY);  Surgeon: Yolis Freitas MD;  Location: [facility];  Service: Endoscopy;  Laterality: N/A;    INJECTION OF ANESTHETIC AGENT INTO SACROILIAC JOINT Left 3/24/2021    Procedure: Left BLOCK, SACROILIAC JOINT and bilateral rhomboid TPI;  Surgeon: Dillan Tsai MD;  Location: [facility] PAIN MGT;  Service: Pain Management;  Laterality: Left;    INTRALUMINAL GASTROINTESTINAL TRACT IMAGING VIA CAPSULE N/A 3/22/2022    Procedure: IMAGING PROCEDURE, GI TRACT, INTRALUMINAL, VIA CAPSULE;  Surgeon: Justice Martinez RN;  Location: [facility] ENDO;  Service: Endoscopy;  Laterality: N/A;    REVERSE TOTAL SHOULDER ARTHROPLASTY Left 1/10/2022    Procedure: ARTHROPLASTY, SHOULDER, TOTAL, REVERSE;  Surgeon: Anthony Alvarado MD;  Location: [facility] OR;  Service:  Orthopedics;  Laterality: Left;  left reverse total shoulder arthroplasty     SELECTIVE INJECTION OF ANESTHETIC AGENT AROUND LUMBAR SPINAL NERVE ROOT BY TRANSFORAMINAL APPROACH Left 6/11/2020    Procedure: BLOCK, SPINAL NERVE ROOT, LUMBAR, SELECTIVE, TRANSFORAMINAL APPROACH Left L4-5, L5-S1 TFESI with RN IV sedation;  Surgeon: Dillan Tsai MD;  [facility];  Service: Pain Management;  Laterality: Left;    TOE AMPUTATION Left 6/29/2020    Procedure: AMPUTATION, TOE;  Surgeon: Barb Veras DPM;  Location: [facility] OR;  Service: Podiatry;  Laterality: Left;  great toe       Review of patient's allergies indicates:   Allergen Reactions    Protamine Hives     Urticaria, possible upper airway swelling       Current Facility-Administered Medications on File Prior to Encounter   Medication    omalizumab injection 300 mg    omalizumab injection 75 mg     Current Outpatient Medications on File Prior to Encounter   Medication Sig    albuterol (PROVENTIL) 2.5 mg /3 mL (0.083 %) nebulizer solution INHALE THE CONTENTS OF 1 VIAL VIA NEBULIZER EVERY 4 TO 6 HOURS AS NEEDED FOR WHEEZING OR FOR SHORTNESS OF BREATH    albuterol sulfate 90 mcg/actuation aebs Inhale 2 puffs into the lungs every 4 (four) hours as needed (WHEEZING OR SHORTNESS OF BREATH).    allopurinoL (ZYLOPRIM) 100 MG tablet TAKE 1 TABLET ONE TIME DAILY    amLODIPine (NORVASC) 2.5 MG tablet Take 1 tablet (2.5 mg total) by mouth once daily.    atorvastatin (LIPITOR) 40 MG tablet Take 1 tablet (40 mg total) by mouth every evening.    blood sugar diagnostic (TRUE METRIX GLUCOSE TEST STRIP) Strp Inject 1 each into the skin 4 (four) times daily.    blood-glucose meter Misc Use as instructed to test blood glucose meter daily.    brinzolamide-brimonidine (SIMBRINZA) 1-0.2 % DrpS Place 1 drop into both eyes 3 (three) times daily.    cyanocobalamin (VITAMIN B-12) 1000 MCG tablet Take 1 tablet by mouth once daily.    DEXCOM G7 SENSOR Shefali Change sensor every 10 days  "   ELIQUIS 5 mg Tab TAKE 1 TABLET TWICE DAILY    empagliflozin (JARDIANCE) 25 mg tablet Take 1 tablet (25 mg total) by mouth once daily.    EPINEPHrine (EPIPEN) 0.3 mg/0.3 mL AtIn Inject 0.3 mLs (0.3 mg total) into the muscle once. for 1 dose    furosemide (LASIX) 40 MG tablet Take 40 mg by mouth 2 (two) times daily. 2.5 tablets twice daily    gabapentin (NEURONTIN) 800 MG tablet Take 1 tablet (800 mg total) by mouth 3 (three) times daily.    INV allopurinol tablet Take 1 tablet by mouth once daily.    lancets (TRUEPLUS LANCETS) 33 gauge Misc Use to check blood sugar twice a day    lancets (TRUEPLUS LANCETS) 33 gauge Misc USE AS DIRECTED TO TEST BLOOD SUGAR FOUR TIMES DAILY    latanoprost 0.005 % ophthalmic solution Place 1 drop into both eyes every evening.    lisinopriL (PRINIVIL,ZESTRIL) 30 MG tablet TAKE 1 TABLET EVERY DAY. (Patient not taking: Reported on 11/12/2024)    lisinopriL 10 MG tablet Take 10 mg by mouth.    methocarbamoL (ROBAXIN) 750 MG Tab Take 750 mg by mouth 2 (two) times daily as needed.    multivitamin-iron-folic acid (SENTRY) Tab Take 1 tablet by mouth once daily.    omalizumab (XOLAIR) 150 mg/mL injection Inject 2 mLs (300 mg total) into the skin every 14 (fourteen) days.    omalizumab (XOLAIR) 75 mg/0.5 mL injection Inject 0.5 mLs (75 mg total) into the skin every 14 (fourteen) days.    pantoprazole (PROTONIX) 40 MG tablet Take 1 tablet (40 mg total) by mouth once daily.    pen needle, diabetic (BD ULTRA-FINE SHORT PEN NEEDLE) 31 gauge x 5/16" Ndle 1 each by Misc.(Non-Drug; Combo Route) route 3 (three) times daily.    pep injection Inject 0.15 ml as directed     For compounding pharmacy use:   Add PAPAVERINE 30 mg  Add PHENTOLAMINE 1 mg  Add ALPROSTADIL 20 mcg    pregabalin (LYRICA) 100 MG capsule Take 1 capsule (100 mg total) by mouth 3 (three) times daily.    ranolazine (RANEXA) 1,000 mg Tb12 TAKE 1 TABLET TWICE DAILY    semaglutide (OZEMPIC) 2 mg/dose (8 mg/3 mL) PnIj Inject 2 mg into the " skin every 7 days.    sertraline (ZOLOFT) 100 MG tablet Take 1 tablet (100 mg total) by mouth every evening.    topiramate (TOPAMAX) 25 MG tablet Take 1 tablet (25 mg total) by mouth 2 (two) times daily.    traMADoL (ULTRAM) 50 mg tablet Take 50 mg by mouth every 6 (six) hours as needed.    traZODone (DESYREL) 100 MG tablet TAKE 2 TABLETS EVERY EVENING    VIOS AEROSOL DELIVERY SYSTEM Shefali USE AS DIRESTED    [DISCONTINUED] diclofenac sodium (VOLTAREN) 1 % Gel APPLY 2 GRAMS TOPICALLY FOUR TIMES DAILY (Patient not taking: Reported on 10/11/2024)     Family History       Problem Relation (Age of Onset)    Cancer Maternal Grandfather    Cataracts Mother, Father, Sister    Diabetes Mother, Sister, Maternal Aunt    Glaucoma Mother, Sister, Maternal Aunt    Hypertension Mother    No Known Problems Brother, Daughter, Son    Stroke Father, Sister          Tobacco Use    Smoking status: Never    Smokeless tobacco: Never   Substance and Sexual Activity    Alcohol use: Yes     Comment: rare, maybe holidays    Drug use: Not Currently     Types: Cocaine    Sexual activity: Not Currently     Partners: Female     Review of Systems   Constitutional:  Negative for chills, diaphoresis, fatigue and fever.   Respiratory:  Negative for cough and shortness of breath.    Cardiovascular:  Negative for chest pain, palpitations and leg swelling.   Gastrointestinal:  Negative for diarrhea, nausea and vomiting.   Genitourinary:  Negative for dysuria, flank pain and hematuria.   Neurological:  Negative for dizziness, weakness and light-headedness.   All other systems reviewed and are negative.    Objective:     Vital Signs (Most Recent):  Temp: 98.6 °F (37 °C) (11/13/24 1519)  Pulse: 71 (11/13/24 2003)  Resp: 18 (11/13/24 1519)  BP: (!) 162/97 (11/13/24 2003)  SpO2: 100 % (11/13/24 2003) Vital Signs (24h Range):  Temp:  [98.6 °F (37 °C)] 98.6 °F (37 °C)  Pulse:  [68-86] 71  Resp:  [18] 18  SpO2:  [97 %-100 %] 100 %  BP: (147-162)/(70-97) 162/97      Weight: 83.5 kg (184 lb)  Body mass index is 30.62 kg/m².     Physical Exam  Vitals and nursing note reviewed.   Constitutional:       General: He is awake. He is not in acute distress.     Appearance: Normal appearance. He is well-developed and well-groomed. He is not ill-appearing, toxic-appearing or diaphoretic.   HENT:      Head: Normocephalic and atraumatic.   Eyes:      Extraocular Movements: Extraocular movements intact.      Conjunctiva/sclera: Conjunctivae normal.      Pupils: Pupils are equal, round, and reactive to light.   Cardiovascular:      Rate and Rhythm: Normal rate and regular rhythm.      Pulses: Normal pulses.      Heart sounds: Normal heart sounds. No murmur heard.  Pulmonary:      Effort: Pulmonary effort is normal.      Breath sounds: Normal breath sounds. No decreased breath sounds, wheezing, rhonchi or rales.   Abdominal:      General: Bowel sounds are normal.      Palpations: Abdomen is soft.      Tenderness: There is no abdominal tenderness.   Musculoskeletal:      Cervical back: Normal range of motion and neck supple.      Comments: 5/5 strength throughout   Skin:     General: Skin is warm and dry.      Capillary Refill: Capillary refill takes less than 2 seconds.   Neurological:      General: No focal deficit present.      Mental Status: He is alert and oriented to person, place, and time. Mental status is at baseline.      GCS: GCS eye subscore is 4. GCS verbal subscore is 5. GCS motor subscore is 6.      Cranial Nerves: Cranial nerves 2-12 are intact.      Sensory: Sensation is intact.      Motor: Motor function is intact.   Psychiatric:         Mood and Affect: Mood normal.         Behavior: Behavior normal. Behavior is cooperative.              CRANIAL NERVES     CN III, IV, VI   Pupils are equal, round, and reactive to light.     LABS:  Recent Results (from the past 24 hours)   CBC auto differential    Collection Time: 11/13/24  6:54 PM   Result Value Ref Range    WBC 7.09 3.90  - 12.70 K/uL    RBC 4.11 (L) 4.60 - 6.20 M/uL    Hemoglobin 11.9 (L) 14.0 - 18.0 g/dL    Hematocrit 36.9 (L) 40.0 - 54.0 %    MCV 90 82 - 98 fL    MCH 29.0 27.0 - 31.0 pg    MCHC 32.2 32.0 - 36.0 g/dL    RDW 14.6 (H) 11.5 - 14.5 %    Platelets 191 150 - 450 K/uL    MPV 10.0 9.2 - 12.9 fL    Immature Granulocytes 0.4 0.0 - 0.5 %    Gran # (ANC) 4.1 1.8 - 7.7 K/uL    Immature Grans (Abs) 0.03 0.00 - 0.04 K/uL    Lymph # 2.1 1.0 - 4.8 K/uL    Mono # 0.7 0.3 - 1.0 K/uL    Eos # 0.2 0.0 - 0.5 K/uL    Baso # 0.02 0.00 - 0.20 K/uL    nRBC 0 0 /100 WBC    Gran % 57.6 38.0 - 73.0 %    Lymph % 29.8 18.0 - 48.0 %    Mono % 9.2 4.0 - 15.0 %    Eosinophil % 2.7 0.0 - 8.0 %    Basophil % 0.3 0.0 - 1.9 %    Platelet Estimate Appears normal     Differential Method Automated    Comprehensive metabolic panel    Collection Time: 11/13/24  6:54 PM   Result Value Ref Range    Sodium 144 136 - 145 mmol/L    Potassium 4.1 3.5 - 5.1 mmol/L    Chloride 107 95 - 110 mmol/L    CO2 27 23 - 29 mmol/L    Glucose 98 70 - 110 mg/dL    BUN 33 (H) 8 - 23 mg/dL    Creatinine 2.1 (H) 0.5 - 1.4 mg/dL    Calcium 8.7 8.7 - 10.5 mg/dL    Total Protein 7.4 6.0 - 8.4 g/dL    Albumin 3.3 (L) 3.5 - 5.2 g/dL    Total Bilirubin 0.2 0.1 - 1.0 mg/dL    Alkaline Phosphatase 94 40 - 150 U/L    AST 16 10 - 40 U/L    ALT 15 10 - 44 U/L    eGFR 34 (A) >60 mL/min/1.73 m^2    Anion Gap 10 8 - 16 mmol/L   Urinalysis, Reflex to Urine Culture Urine, Clean Catch    Collection Time: 11/13/24  6:55 PM    Specimen: Urine, Clean Catch   Result Value Ref Range    Specimen UA Urine, Clean Catch     Color, UA Yellow Yellow, Straw, Angela    Appearance, UA Clear Clear    pH, UA 6.0 5.0 - 8.0    Specific Gravity, UA 1.010 1.005 - 1.030    Protein, UA 1+ (A) Negative    Glucose, UA 4+ (A) Negative    Ketones, UA Negative Negative    Bilirubin (UA) Negative Negative    Occult Blood UA Negative Negative    Nitrite, UA Negative Negative    Urobilinogen, UA Negative <2.0 EU/dL     Leukocytes, UA Negative Negative   Urinalysis Microscopic    Collection Time: 11/13/24  6:55 PM   Result Value Ref Range    RBC, UA 0 0 - 4 /hpf    WBC, UA 0 0 - 5 /hpf    Bacteria None None-Occ /hpf    Yeast, UA None None    Hyaline Casts, UA 0 0-1/lpf /lpf    Microscopic Comment SEE COMMENT        RADIOLOGY  US Retroperitoneal Complete    Result Date: 11/13/2024  EXAM: US RETROPERITONEAL COMPLETE CLINICAL HISTORY: Disorder kidneys and ureters FINDINGS: The right kidney measures 11.0 cm in length. The left kidney measures 11.0 cm in length.  4 mm nonobstructing stone lower pole right kidney.  1.2 x 1.3 cm cyst upper pole left kidney.  No contour deforming mass, hydronephrosis, or perinephric fluid collection. The urinary bladder is grossly normal.      No acute findings Finalized on: 11/13/2024 11:42 AM By:  Devon Singleton MD [facility]# [id number]      2024-11-13 11:44:[id number]    [facility]    MRI Brain Without Contrast    Result Date: 11/8/2024  EXAMINATION: MRI BRAIN WITHOUT CONTRAST CLINICAL HISTORY: Chronic migraine with aura, intractable, without status migrainosusDizziness, persistent/recurrent, cardiac or vascular cause suspected;Headache, new or worsening (Age >= 50y); TECHNIQUE: Standard multiplanar non-contrast sequences of the brain. COMPARISON: 03/02/2022 CT of the brain FINDINGS: Intracranial compartment: Ventricles and sulci are mildly enlarged in size for age without evidence of hydrocephalus. No extra-axial blood or fluid collections. There is mild to moderate scattered periventricular and subcortical white matter hyperintensity consistent with age-related microvascular ischemic change.  No mass lesion, acute hemorrhage, edema or acute infarct. Normal vascular flow voids are preserved. The diffusion-weighted sequence is negative.  No evidence of acute infarct. Skull/extracranial contents (limited evaluation): Bone marrow signal intensity is normal.     No acute or focal abnormality.  Mild cerebral atrophy and  white matter degeneration Electronically signed by: Jeremias Ochoa MD Date:    11/08/2024 Time:    11:08    Posterior Segment OCT Optic Nerve- Both eyes    Result Date: 10/22/2024  Right Eye Quality was good. Scan locations included Retinal Nerve Fiber Layer (RNFL). Left Eye Quality was good. Scan locations included Retinal Nerve Fiber Layer (RNFL). Findings Right Eye Low. TI. Yes. Progression has worsened. Left Eye Borderline. Yes. Progression has been stable. Notes GCL  27/42 - ERM OS      EKG    MICROBIOLOGY    MDM     Amount and/or Complexity of Data Reviewed  Clinical lab tests: reviewed  Tests in the radiology section of CPT®: reviewed  Tests in the medicine section of CPT®: reviewed  Discussion of test results with the performing providers: yes  Decide to obtain previous medical records or to obtain history from someone other than the patient: yes  Obtain history from someone other than the patient: yes  Review and summarize past medical records: yes  Discuss the patient with other providers: yes  Independent visualization of images, tracings, or specimens: yes        Assessment/Plan:     * JOSE MARIA (acute kidney injury)  JOSE MARIA is likely due to pre-renal azotemia due to dehydration. Baseline creatinine is  1.1 . Most recent creatinine and eGFR are listed below.  Recent Labs     11/12/24  1111 11/13/24  1854   CREATININE 2.7* 2.1*   EGFRNORACEVR 25.0* 34*      Plan  - JOSE MARIA is  being treated  - Avoid nephrotoxins and renally dose meds for GFR listed above  - Monitor urine output, serial BMP, and adjust therapy as needed      Essential hypertension  Chronic, controlled.  Latest blood pressure and vitals reviewed-   Temp:  [97.6 °F (36.4 °C)-98.6 °F (37 °C)]   Pulse:  [68-86]   Resp:  [18]   BP: (147-162)/(70-97)   SpO2:  [97 %-100 %] .   Home meds for hypertension were reviewed and noted below.   Hypertension Medications               amLODIPine (NORVASC) 2.5 MG tablet Take 1 tablet (2.5 mg total) by mouth once  daily.    furosemide (LASIX) 40 MG tablet Take 40 mg by mouth 2 (two) times daily. 2.5 tablets twice daily    lisinopriL (PRINIVIL,ZESTRIL) 30 MG tablet TAKE 1 TABLET EVERY DAY.    lisinopriL 10 MG tablet Take 10 mg by mouth.    pep injection Inject 0.15 ml as directed     For compounding pharmacy use:   Add PAPAVERINE 30 mg  Add PHENTOLAMINE 1 mg  Add ALPROSTADIL 20 mcg     While in the hospital, will manage blood pressure as follows; Continue home antihypertensive regimen    Will utilize p.r.n. blood pressure medication only if patient's blood pressure greater than  180/110 and he develops symptoms such as worsening chest pain or shortness of breath.      Diabetic polyneuropathy  Compliant with gabapentin.  Denies any new or worsening symptoms.  Plan:   -continue gabapentin      Chronic combined systolic and diastolic heart failure  Patient is identified as having Combined Systolic and Diastolic heart failure that is Chronic. CHF is currently controlled. Latest ECHO performed and demonstrates- Results for orders placed during the hospital encounter of 07/30/22    Echo    Interpretation Summary  · The left ventricle is normal in size with severe concentric hypertrophy and mildly decreased systolic function.  · Mild left atrial enlargement.  · The estimated ejection fraction is 40 - 45%.  · Grade III left ventricular diastolic dysfunction.  · Normal right ventricular size with mildly reduced right ventricular systolic function.  · Mild tricuspid regurgitation.  · Mild pulmonic regurgitation.  · Elevated central venous pressure (15 mmHg).  · The estimated PA systolic pressure is 45 mmHg.  · There is pulmonary hypertension.  . Continue Beta Blocker ACE/ARB Furosemide and monitor clinical status closely. Monitor on telemetry. Patient is off CHF pathway.  Monitor strict Is&Os and daily weights.  Place on fluid restriction of 2 L. Continue to stress to patient importance of self efficacy and  on diet for CHF. Last  "BNP reviewed- and noted below No results for input(s): "BNP", "BNPTRIAGEBLO" in the last 168 hours..      Nonobstructive coronary artery disease of native artery of native heart  Patient with known CAD which is controlled Will continue Eliquis and Statin and monitor for S/Sx of angina/ACS. Continue to monitor on telemetry.       PAF (paroxysmal atrial fibrillation)  Patient has paroxysmal (<7 days) atrial fibrillation. Patient is currently in sinus rhythm. [id number] Score: 3. The patients heart rate in the last 24 hours is as follows:  Pulse  Min: 68  Max: 86     Antiarrhythmics       Anticoagulants  enoxaparin injection 40 mg, Every 24 hours, Subcutaneous    Plan  - Replete lytes with a goal of K>4, Mg >2  - Patient is anticoagulated, see medications listed above.  - Patient's afib is currently controlled      Hyperlipidemia associated with type 2 diabetes mellitus  Patient is chronically on statin.will continue for now. Last Lipid Panel:   Lab Results   Component Value Date    CHOL 144 11/08/2024    HDL 39 (L) 11/08/2024    LDLCALC 73.8 11/08/2024    TRIG 156 (H) 11/08/2024    CHOLHDL 27.1 11/08/2024     Plan:  -Continue home medication  -low fat/low calorie diet      Type 2 diabetes mellitus  Patient's FSGs are controlled on current medication regimen.  Last A1c reviewed-   Lab Results   Component Value Date    HGBA1C 5.7 (H) 11/08/2024     Most recent fingerstick glucose reviewed-   Recent Labs   Lab 11/13/24 2129   POCTGLUCOSE 114*     Current correctional scale  Low  Maintain anti-hyperglycemic dose as follows-   Antihyperglycemics (From admission, onward)     Hold Oral hypoglycemics while patient is in the hospital.  SSI  Accuchecks        VTE Risk Mitigation (From admission, onward)           Ordered     apixaban tablet 5 mg  2 times daily         11/14/24 0140     IP VTE HIGH RISK PATIENT  Once         11/13/24 2038     Place sequential compression device  Until discontinued         11/13/24 2038         "             //Core Measures   -DVT proph: SCDs,Eliquis  -Code status Full    -Surrogate:none present       Components of this note were documented using a voice recognition system and are subject to errors not corrected at the time the document was proof read. Please contact the author for any clarifications.       Bob Vargas NP  Department of Hospital Medicine  [facility]

## 2024-11-14 NOTE — CONSULTS
[facility]  Wound Care    Patient Name:  Crow Green   MRN:  [id number]  Date: 11/14/2024  Diagnosis: JOSE MARIA (acute kidney injury)    History:     Past Medical History:   Diagnosis Date    Arthritis     Asthma     Atrial fibrillation     Atrial fibrillation with RVR 08/07/2019    Carotid artery stenosis     CHF (congestive heart failure)     Chronic obstructive pulmonary disease 08/05/2020    Depression     Dermatomyositis     Diabetes mellitus, type 2 Diagnosed in 2000    Elevated coronary artery calcium score 02/14/2024    Elevated PSA     Follow up 07/30/2020    Hypertension     Myocardial infarction     2017    Sleep apnea        Social History     Socioeconomic History    Marital status: Legally     Number of children: 5   Occupational History    Occupation: disabled    Occupation: PT at [employer]    Tobacco Use    Smoking status: Never    Smokeless tobacco: Never   Substance and Sexual Activity    Alcohol use: Yes     Comment: rare, maybe holidays    Drug use: Not Currently     Types: Cocaine    Sexual activity: Not Currently     Partners: Female   Social History Narrative    Utilizing [employer] transportation which has been unreliable.      Social Drivers of Health     Financial Resource Strain: High Risk (11/13/2024)    Overall Financial Resource Strain (CARDIA)     Difficulty of Paying Living Expenses: Hard   Food Insecurity: Food Insecurity Present (11/13/2024)    Hunger Vital Sign     Worried About Running Out of Food in the Last Year: Often true     Ran Out of Food in the Last Year: Often true   Transportation Needs: Unmet Transportation Needs (11/13/2024)    TRANSPORTATION NEEDS     Transportation : Yes, it has kept me from medical appointments or from getting my medications.   Physical Activity: Inactive (12/7/2022)    Exercise Vital Sign     Days of Exercise per Week: 0 days     Minutes of Exercise per Session: 0 min   Stress: Stress Concern Present (11/13/2024)     [facility] - Occupational Stress Questionnaire     Feeling of Stress : Very much   Housing Stability: High Risk (11/13/2024)    Housing Stability Vital Sign     Unable to Pay for Housing in the Last Year: Yes     Homeless in the Last Year: No       Precautions:     Allergies as of 11/13/2024 - Reviewed 11/13/2024   Allergen Reaction Noted    Protamine Hives 02/27/2020       WOC Assessment Details/Treatment             Consulted to evaluate wound to L great toe.  Mr. Green is a 68 yo male patient admitted 11/13/24 with JOSE MARIA.  PMH includes:    Arthritis, Asthma, Atrial fibrillation, Atrial fibrillation with RVR (08/07/2019), Carotid artery stenosis, CHF (congestive heart failure), Chronic obstructive pulmonary disease (08/05/2020), Depression, Dermatomyositis, Diabetes mellitus, type 2 (Diagnosed in 2000), Elevated coronary artery calcium score (02/14/2024), Elevated PSA, Follow up (07/30/2020), Hypertension, Myocardial infarction, and Sleep apnea.    This afternoon, patient is sitting up @ side of his bed, dangling feet over edge of bed.  Noted that patient had bout of crushing sternal chest pain approx 1 hour ago; denies any complaints @ present.    ---Noted patient s/p L great toe amputation 2020.  States he usually sees Dr. THAI Veras w/ podiatry for nail trimming and treatment of callous to L foot every 3 months or so.  States he has new diabetic tennis shoes; noted custom insert to this L shoe in place.  Noted dry skin to entire foot and toes.  Noted small, thickened callous to distal aspect of MT head, as well as medial aspect of MT head (see photo).  No open wound noted.    Recommend:  application of moisturizer daily after bath and prn per nursing staff.  Patient states he plans f/u with podiatry as previously scheduled.  Will sign off for now.  Please re-consult as needed for any further wound care needs.       11/14/24 1415   WOCN Assessment   WOCN Total Time (mins) 15   Visit  Date 11/14/24   Visit Time 1415   Consult Type New   WOCN Speciality Wound   Intervention assessed;applied;chart review;coordination of care   Teaching on-going   Skin Interventions   Device Skin Pressure Protection absorbent pad utilized/changed   Pressure Reduction Devices pressure-redistributing mattress utilized   Pressure Reduction Techniques frequent weight shift encouraged   Skin Protection incontinence pads utilized   Positioning   Body Position sitting up in bed   Head of Bed (HOB) Positioning HOB elevated   Positioning/Transfer Devices pillows;in use   [REMOVED]      Wound 11/13/24 2110 Left anterior Foot   Final Assessment Date/Final Assessment Time: 11/14/24 1415  Date First Assessed/Time First Assessed: 11/13/24 2110   Side: Left  Orientation: anterior  Location: Foot  Wound Outcome: Healed   Wound Image           11/14/2024

## 2024-11-14 NOTE — PLAN OF CARE
[facility]  Initial Discharge Assessment       Primary Care Provider: Cm Polanco MD    Admission Diagnosis: Chest pain [R07.9]  Acute renal failure, unspecified acute renal failure type [N17.9]    Admission Date: 11/13/2024  Expected Discharge Date: 11/14/2024    Transition of Care Barriers: None    Payor: HUMANA MANAGED MEDICARE / Plan: HU[employer] Lending[employer] HMO PPO SPECIAL NEEDS / Product Type: Medicare Advantage /     Extended Emergency Contact Information  Primary Emergency Contact: Gretchen Green  Mobile Phone: [phone]  Relation: Sister  Secondary Emergency Contact: Erica Green  Address: [address] [place] [address]            [place] [address] [place]  Mobile Phone: [phone]  Relation: Relative    Discharge Plan A: [facility] - [place], [place] - [address]  [address] [address]  Phone: [phone] Fax: [phone]    [id number] - [place] - [address] [facility]  [address] [place] [address]  Phone: [phone] Fax: [phone]      Initial Assessment (most recent)       Adult Discharge Assessment - 11/14/24 1042          Discharge Assessment    Assessment Type Discharge Planning Assessment     Source of Information health record;other (see comments)   spoke with  agency    Reason For Admission JOSE MARIA     People in Home parent(s)     Facility Arrived From: home     Do you expect to return to your current living situation? Yes     Do you have help at home or someone to help you manage your care at home? Yes     Who are your caregiver(s) and [facility]     Prior to hospitilization cognitive status: Alert/Oriented     Current cognitive status: Alert/Oriented     Readmission within 30 days? No     Patient currently being followed by outpatient case management? No     Do you currently have service(s) that help you manage your care at  home? Yes     Name and Contact number of agency [facility]     Is the pt/caregiver preference to resume services with current agency Yes     Do you take prescription medications? Yes     Do you have prescription coverage? Yes     Do you have any problems affording any of your prescribed medications? TBD     Is the patient taking medications as prescribed? yes     How do you get to doctors appointments? family or friend will provide     Are you on dialysis? No     Discharge Plan A Home Health     DME Needed Upon Discharge  none     Transition of Care Barriers None                      Anticipated DC Dispo: home with resumption of [facility]  Prior Level of Function: independent per records  PCP: Cm Polanco MD  Comments:  CM attempted to meet with patient at bedside to introduce role and discuss d/c planning. Patient is currently sleeping, eyes closed, respirations even, unlabored. Does not arouse to verbal stimuli at this time. CM performed assessment primarily through chart review and speaking with  agency staff. CM will remain available for needs.

## 2024-11-14 NOTE — ASSESSMENT & PLAN NOTE
Patient with known CAD which is controlled Will continue Eliquis and Statin and monitor for S/Sx of angina/ACS. Continue to monitor on telemetry.

## 2024-11-14 NOTE — PLAN OF CARE
Problem: Adult Inpatient Plan of Care  Goal: Plan of Care Review  Outcome: Progressing  Goal: Patient-Specific Goal (Individualized)  Outcome: Progressing  Goal: Absence of Hospital-Acquired Illness or Injury  Outcome: Progressing  Goal: Optimal Comfort and Wellbeing  Outcome: Progressing  Goal: Readiness for Transition of Care  Outcome: Progressing     Problem: Diabetes Comorbidity  Goal: Blood Glucose Level Within Targeted Range  Outcome: Progressing     Problem: Wound  Goal: Optimal Coping  Outcome: Progressing  Goal: Optimal Functional Ability  Outcome: Progressing  Goal: Absence of Infection Signs and Symptoms  Outcome: Progressing  Goal: Improved Oral Intake  Outcome: Progressing  Goal: Optimal Pain Control and Function  Outcome: Progressing  Goal: Skin Health and Integrity  Outcome: Progressing  Goal: Optimal Wound Healing  Outcome: Progressing     Problem: Fall Injury Risk  Goal: Absence of Fall and Fall-Related Injury  Outcome: Progressing     Problem: Acute Kidney Injury/Impairment  Goal: Fluid and Electrolyte Balance  Outcome: Progressing  Goal: Improved Oral Intake  Outcome: Progressing  Goal: Effective Renal Function  Outcome: Progressing

## 2024-11-14 NOTE — DISCHARGE SUMMARY
[facility]  Hospital Medicine  Discharge Summary      Patient Name: Crow Green  MRN: [id number]  [facility]: [id number]  Patient Class: IP- Inpatient  Admission Date: 11/13/2024  Hospital Length of Stay: 1 days  Discharge Date and Time: 11/14/2024  Attending Physician: Eamon Olmos MD   Discharging Provider: SUKHJINDER Alejandro  Primary Care Provider: Cm Polanco MD    Primary Care Team: Networked reference to record PCT     HPI:   Crow Green is a 69 y.o. male with a PMH  has a past medical history of Arthritis, Asthma, Atrial fibrillation, Atrial fibrillation with RVR (08/07/2019), Carotid artery stenosis, CHF (congestive heart failure), Chronic obstructive pulmonary disease (08/05/2020), Depression, Dermatomyositis, Diabetes mellitus, type 2 (Diagnosed in 2000), Elevated coronary artery calcium score (02/14/2024), Elevated PSA, Follow up (07/30/2020), Hypertension, Myocardial infarction, and Sleep apnea.presented to the Emergency Department for evaluation of abnormal kidney labs from routine check-up. Pt denies any recent heat exposure. Pt denies usage of any medications (ASA/NSAIDs) besides Tylenol. Patient denies any dysuria, hematuria, abd pain, CP, SOB, dizziness, and all other sxs at this time. No prior Tx. No further complaints or concerns at this time.       ER workup revealed BUN/creatinine of 2.1/74 (previously 2.7/25 on 11/05/2024).  Baseline creatinine level 1.1.  Ultrasound of retroperitoneal negative for acute findings.  Vital signs stable.  Patient received 1 L normal saline in ED.  Hospital Medicine consulted to admit patient for JOSE MARIA.  Patient in agreement with treatment plan.  Patient will be admitted under inpatient status.    PCP: Cm Polanco.      * No surgery found *      Hospital Course:   69 y.o male with a PMH of arthritis, asthma, atrial fibrillation with RVR, carotid artery stenosis, CHF, COPD, depression, dermatomyositis, type 2 DM, elevated PSA, HTN, MI,  and sleep apnea admitted for treatment of acute kidney injury. Patient was referred to the ED by his PCP after abnormal routine lab work. Afebrile overnight, hemodynamically stable. CBC unremarkable and BUN/Cr 31/1.8, which has improved since admissions. Patient denies dysuria, flank pain, hematuria, difficulty urinating, fever, chills, CP, nausea, vomiting, or lower abdominal pain. Discussed discharge planning with patient and he is agreeable. Patient seen and examined, deemed stable for discharge to f/u with PCP.     Goals of Care Treatment Preferences:  Code Status: Full Code      SDOH Screening:  The patient was screened for food insecurity, housing instability, transportation needs, utility difficulties, and interpersonal safety. The social determinant(s) of health identified as a concern this admission are:  Housing instability  Food insecurity  Utility difficulties  Transportation difficulties    The plan to address these concerns is: resources to be provided by SW/CM.    Social Drivers of Health with Concerns     Food Insecurity: Food Insecurity Present (11/13/2024)   Housing Stability: High Risk (11/13/2024)   Transportation Needs: Unmet Transportation Needs (11/13/2024)   Utilities: At Risk (11/13/2024)        Consults:     No new Assessment & Plan notes have been filed under this hospital service since the last note was generated.  Service: Hospital Medicine    Final Active Diagnoses:    Diagnosis Date Noted POA    PRINCIPAL PROBLEM:  OJSE MARIA (acute kidney injury) [N17.9] 11/13/2024 Unknown    Type 2 diabetes mellitus [E11.9] 11/13/2024 Unknown    Hyperlipidemia associated with type 2 diabetes mellitus [E11.69, E78.5] 07/29/2019 Yes    PAF (paroxysmal atrial fibrillation) [I48.0] 03/17/2017 Yes     Chronic    Nonobstructive coronary artery disease of native artery of native heart [I25.118] 12/05/2016 Yes     Chronic    Chronic combined systolic and diastolic heart failure [I50.42] 12/05/2016 Yes    Diabetic  polyneuropathy [E11.42] 02/17/2016 Yes    Essential hypertension [I10] 02/27/2015 Yes      Problems Resolved During this Admission:       Discharged Condition: good    Disposition: Home or Self Care    Follow Up:   Follow-up Information       Cm Polanco MD. Schedule an appointment as soon as possible for a visit.    Specialty: Family Medicine  Contact information:  [address]  [place] [address]  [phone]               [facility] Follow up.    Specialties: Home Health Services, Home Therapy Services, Home Living Aide Services  Why: home health  Contact information:  [address]  [facility] [address]  [phone]                         Patient Instructions:   No discharge procedures on file.    Significant Diagnostic Studies: Labs: All labs within the past 24 hours have been reviewed    Pending Diagnostic Studies:       None           Medications:  Reconciled Home Medications:      Medication List        CONTINUE taking these medications      * albuterol sulfate 90 mcg/actuation Aebs  Inhale 2 puffs into the lungs every 4 (four) hours as needed (WHEEZING OR SHORTNESS OF BREATH).     * albuterol 2.5 mg /3 mL (0.083 %) nebulizer solution  Commonly known as: PROVENTIL  INHALE THE CONTENTS OF 1 VIAL VIA NEBULIZER EVERY 4 TO 6 HOURS AS NEEDED FOR WHEEZING OR FOR SHORTNESS OF BREATH     allopurinoL 100 MG tablet  Commonly known as: ZYLOPRIM  TAKE 1 TABLET ONE TIME DAILY     amLODIPine 2.5 MG tablet  Commonly known as: NORVASC  Take 1 tablet (2.5 mg total) by mouth once daily.     atorvastatin 40 MG tablet  Commonly known as: LIPITOR  Take 1 tablet (40 mg total) by mouth every evening.     blood sugar diagnostic Strp  Commonly known as: TRUE METRIX GLUCOSE TEST STRIP  Inject 1 each into the skin 4 (four) times daily.     blood-glucose meter Misc  Use as instructed to test blood glucose meter daily.     cyanocobalamin 1000 MCG tablet  Commonly  "known as: VITAMIN B-12  Take 1 tablet by mouth once daily.     DEXCOM G7 SENSOR Shefali  Generic drug: blood-glucose sensor  Change sensor every 10 days     ELIQUIS 5 mg Tab  Generic drug: apixaban  TAKE 1 TABLET TWICE DAILY     empagliflozin 25 mg tablet  Commonly known as: JARDIANCE  Take 1 tablet (25 mg total) by mouth once daily.     EPINEPHrine 0.3 mg/0.3 mL Atin  Commonly known as: EPIPEN  Inject 0.3 mLs (0.3 mg total) into the muscle once. for 1 dose     furosemide 40 MG tablet  Commonly known as: LASIX  Take 40 mg by mouth 2 (two) times daily. 2.5 tablets twice daily     gabapentin 800 MG tablet  Commonly known as: NEURONTIN  Take 1 tablet (800 mg total) by mouth 3 (three) times daily.     INV allopurinol tablet  Take 1 tablet by mouth once daily.     latanoprost 0.005 % ophthalmic solution  Place 1 drop into both eyes every evening.     * lisinopriL 10 MG tablet  Take 10 mg by mouth.     methocarbamoL 750 MG Tab  Commonly known as: ROBAXIN  Take 750 mg by mouth 2 (two) times daily as needed.     OZEMPIC 2 mg/dose (8 mg/3 mL) Pnij  Generic drug: semaglutide  Inject 2 mg into the skin every 7 days.     pantoprazole 40 MG tablet  Commonly known as: PROTONIX  Take 1 tablet (40 mg total) by mouth once daily.     pen needle, diabetic 31 gauge x 5/16" Ndle  Commonly known as: BD ULTRA-FINE SHORT PEN NEEDLE  1 each by Misc.(Non-Drug; Combo Route) route 3 (three) times daily.     PEP INJECTION (FOR RX USE)  Inject 0.15 ml as directed     For compounding pharmacy use:   Add PAPAVERINE 30 mg  Add PHENTOLAMINE 1 mg  Add ALPROSTADIL 20 mcg     pregabalin 100 MG capsule  Commonly known as: LYRICA  Take 1 capsule (100 mg total) by mouth 3 (three) times daily.     ranolazine 1,000 mg Tb12  Commonly known as: RANEXA  TAKE 1 TABLET TWICE DAILY     SENTRY Tab  Generic drug: multivitamin-iron-folic acid  Take 1 tablet by mouth once daily.     sertraline 100 MG tablet  Commonly known as: ZOLOFT  Take 1 tablet (100 mg total) by " mouth every evening.     SIMBRINZA 1-0.2 % Drps  Generic drug: brinzolamide-brimonidine  Place 1 drop into both eyes 3 (three) times daily.     topiramate 25 MG tablet  Commonly known as: TOPAMAX  Take 1 tablet (25 mg total) by mouth 2 (two) times daily.     traMADoL 50 mg tablet  Commonly known as: ULTRAM  Take 50 mg by mouth every 6 (six) hours as needed.     traZODone 100 MG tablet  Commonly known as: DESYREL  TAKE 2 TABLETS EVERY EVENING     * TRUEPLUS LANCETS 33 gauge Misc  Generic drug: lancets  Use to check blood sugar twice a day     * TRUEPLUS LANCETS 33 gauge Misc  Generic drug: lancets  USE AS DIRECTED TO TEST BLOOD SUGAR FOUR TIMES DAILY     VIOS AEROSOL DELIVERY SYSTEM Shefali  Generic drug: nebulizer and compressor  USE AS DIRESTED     * XOLAIR 75 mg/0.5 mL injection  Generic drug: omalizumab  Inject 0.5 mLs (75 mg total) into the skin every 14 (fourteen) days.     * XOLAIR 150 mg/mL injection  Generic drug: omalizumab  Inject 2 mLs (300 mg total) into the skin every 14 (fourteen) days.           * This list has 7 medication(s) that are the same as other medications prescribed for you. Read the directions carefully, and ask your doctor or other care provider to review them with you.                ASK your doctor about these medications      * lisinopriL 30 MG tablet  Commonly known as: PRINIVIL,ZESTRIL  TAKE 1 TABLET EVERY DAY.           * This list has 1 medication(s) that are the same as other medications prescribed for you. Read the directions carefully, and ask your doctor or other care provider to review them with you.                  Indwelling Lines/Drains at time of discharge:   Lines/Drains/Airways       None                   Time spent on the discharge of patient: 45 minutes         SUKHJINDER Alejandro  Department of Hospital Medicine  [facility]

## 2024-11-14 NOTE — ASSESSMENT & PLAN NOTE
"Patient is identified as having Combined Systolic and Diastolic heart failure that is Chronic. CHF is currently controlled. Latest ECHO performed and demonstrates- Results for orders placed during the hospital encounter of 07/30/22    Echo    Interpretation Summary  · The left ventricle is normal in size with severe concentric hypertrophy and mildly decreased systolic function.  · Mild left atrial enlargement.  · The estimated ejection fraction is 40 - 45%.  · Grade III left ventricular diastolic dysfunction.  · Normal right ventricular size with mildly reduced right ventricular systolic function.  · Mild tricuspid regurgitation.  · Mild pulmonic regurgitation.  · Elevated central venous pressure (15 mmHg).  · The estimated PA systolic pressure is 45 mmHg.  · There is pulmonary hypertension.  . Continue Beta Blocker ACE/ARB Furosemide and monitor clinical status closely. Monitor on telemetry. Patient is off CHF pathway.  Monitor strict Is&Os and daily weights.  Place on fluid restriction of 2 L. Continue to stress to patient importance of self efficacy and  on diet for CHF. Last BNP reviewed- and noted below No results for input(s): "BNP", "BNPTRIAGEBLO" in the last 168 hours..          "

## 2024-11-14 NOTE — ASSESSMENT & PLAN NOTE
Patient has paroxysmal (<7 days) atrial fibrillation. Patient is currently in sinus rhythm. [id number] Score: 3. The patients heart rate in the last 24 hours is as follows:  Pulse  Min: 68  Max: 86     Antiarrhythmics       Anticoagulants  enoxaparin injection 40 mg, Every 24 hours, Subcutaneous    Plan  - Replete lytes with a goal of K>4, Mg >2  - Patient is anticoagulated, see medications listed above.  - Patient's afib is currently controlled

## 2024-11-14 NOTE — PLAN OF CARE
Discussed poc with pt, pt verbalized understanding    Had episode of SOB and chest pain @ noon. Provider notified, orders placed. Meds given and breathing treatment given. Pt reported relief of symptoms    Pt to be d/tricia today however being kept to observe overnight    O2 sats wnl on 2L NC  Cardiac monitoring in use  Fall precautions in place, remains injury free  Pt denies c/o SOB and chest pain now    Accurate I&Os. Lasix given for fluid overload  Bed locked at lowest position  Call light within reach    Chart check complete  Will cont with POC

## 2024-11-14 NOTE — ASSESSMENT & PLAN NOTE
JOSE MARIA is likely due to pre-renal azotemia due to dehydration. Baseline creatinine is  1.1 . Most recent creatinine and eGFR are listed below.  Recent Labs     11/12/24  1111 11/13/24  1854   CREATININE 2.7* 2.1*   EGFRNORACEVR 25.0* 34*      Plan  - JOSE MARIA is  being treated  - Avoid nephrotoxins and renally dose meds for GFR listed above  - Monitor urine output, serial BMP, and adjust therapy as needed

## 2024-11-14 NOTE — SIGNIFICANT EVENT
Just prior to discharge, patient developed crushing sternal chest pain that was associated with hypoxia and shortness of breath.  Saturations on room air with drop down to the high 80s.  Required 4 L nasal cannula to maintain sats around 92%.  Stat IV morphine, chest x-ray, troponin, EKG ordered to reassess.  Chest x-ray on manual inspection showed some mild to moderate vascular congestion, but otherwise appeared to be clear stable compared to previous.  EKG on manual look shows an elevated QTC and wide QRS, but no arrhythmia and HR in the 70s. No overt signs of ischemia. Patient given IV morphine x 1 dose and IV lasix 40 mg x1. 800cc UOP thereafter. Symptoms rapidly improved. O2 saturations weaned back down to 2LNC, sats around 95%. Will continue to monitor throughout the afternoon. Postpone discharge until tomorrow morning.     Eamon Olmos MD  [facility] Medicine

## 2024-11-14 NOTE — HPI
Crow Green is a 69 y.o. male with a PMH  has a past medical history of Arthritis, Asthma, Atrial fibrillation, Atrial fibrillation with RVR (08/07/2019), Carotid artery stenosis, CHF (congestive heart failure), Chronic obstructive pulmonary disease (08/05/2020), Depression, Dermatomyositis, Diabetes mellitus, type 2 (Diagnosed in 2000), Elevated coronary artery calcium score (02/14/2024), Elevated PSA, Follow up (07/30/2020), Hypertension, Myocardial infarction, and Sleep apnea.presented to the Emergency Department for evaluation of abnormal kidney labs from routine check-up. Pt denies any recent heat exposure. Pt denies usage of any medications (ASA/NSAIDs) besides Tylenol. Patient denies any dysuria, hematuria, abd pain, CP, SOB, dizziness, and all other sxs at this time. No prior Tx. No further complaints or concerns at this time.       ER workup revealed BUN/creatinine of 2.1/74 (previously 2.7/25 on 11/05/2024).  Baseline creatinine level 1.1.  Ultrasound of retroperitoneal negative for acute findings.  Vital signs stable.  Patient received 1 L normal saline in ED.  Hospital Medicine consulted to admit patient for JOSE MARIA.  Patient in agreement with treatment plan.  Patient will be admitted under inpatient status.    PCP: Cm Polanco

## 2024-11-14 NOTE — ASSESSMENT & PLAN NOTE
Chronic, controlled.  Latest blood pressure and vitals reviewed-   Temp:  [97.6 °F (36.4 °C)-98.6 °F (37 °C)]   Pulse:  [68-86]   Resp:  [18]   BP: (147-162)/(70-97)   SpO2:  [97 %-100 %] .   Home meds for hypertension were reviewed and noted below.   Hypertension Medications               amLODIPine (NORVASC) 2.5 MG tablet Take 1 tablet (2.5 mg total) by mouth once daily.    furosemide (LASIX) 40 MG tablet Take 40 mg by mouth 2 (two) times daily. 2.5 tablets twice daily    lisinopriL (PRINIVIL,ZESTRIL) 30 MG tablet TAKE 1 TABLET EVERY DAY.    lisinopriL 10 MG tablet Take 10 mg by mouth.    pep injection Inject 0.15 ml as directed     For compounding pharmacy use:   Add PAPAVERINE 30 mg  Add PHENTOLAMINE 1 mg  Add ALPROSTADIL 20 mcg            While in the hospital, will manage blood pressure as follows; Continue home antihypertensive regimen    Will utilize p.r.n. blood pressure medication only if patient's blood pressure greater than  180/110 and he develops symptoms such as worsening chest pain or shortness of breath.

## 2024-11-14 NOTE — ASSESSMENT & PLAN NOTE
Patient is chronically on statin.will continue for now. Last Lipid Panel:   Lab Results   Component Value Date    CHOL 144 11/08/2024    HDL 39 (L) 11/08/2024    LDLCALC 73.8 11/08/2024    TRIG 156 (H) 11/08/2024    CHOLHDL 27.1 11/08/2024     Plan:  -Continue home medication  -low fat/low calorie diet

## 2024-11-15 VITALS
TEMPERATURE: 98 F | SYSTOLIC BLOOD PRESSURE: 151 MMHG | HEIGHT: 65 IN | HEART RATE: 76 BPM | OXYGEN SATURATION: 94 % | RESPIRATION RATE: 18 BRPM | WEIGHT: 190 LBS | BODY MASS INDEX: 31.65 KG/M2 | DIASTOLIC BLOOD PRESSURE: 60 MMHG

## 2024-11-15 LAB
ALBUMIN SERPL BCP-MCNC: 3.3 G/DL (ref 3.5–5.2)
ALP SERPL-CCNC: 87 U/L (ref 40–150)
ALT SERPL W/O P-5'-P-CCNC: 17 U/L (ref 10–44)
AMPHET+METHAMPHET UR QL: NEGATIVE
ANION GAP SERPL CALC-SCNC: 12 MMOL/L (ref 8–16)
AST SERPL-CCNC: 26 U/L (ref 10–40)
BARBITURATES UR QL SCN>200 NG/ML: NEGATIVE
BASOPHILS # BLD AUTO: 0.01 K/UL (ref 0–0.2)
BASOPHILS NFR BLD: 0.1 % (ref 0–1.9)
BENZODIAZ UR QL SCN>200 NG/ML: NEGATIVE
BILIRUB SERPL-MCNC: 0.4 MG/DL (ref 0.1–1)
BUN SERPL-MCNC: 28 MG/DL (ref 8–23)
BZE UR QL SCN: NEGATIVE
CALCIUM SERPL-MCNC: 8.8 MG/DL (ref 8.7–10.5)
CANNABINOIDS UR QL SCN: NEGATIVE
CHLORIDE SERPL-SCNC: 107 MMOL/L (ref 95–110)
CO2 SERPL-SCNC: 20 MMOL/L (ref 23–29)
CREAT SERPL-MCNC: 1.7 MG/DL (ref 0.5–1.4)
CREAT UR-MCNC: 77.9 MG/DL (ref 23–375)
D DIMER PPP IA.FEU-MCNC: 0.3 MG/L FEU
DIFFERENTIAL METHOD BLD: ABNORMAL
EOSINOPHIL # BLD AUTO: 0.2 K/UL (ref 0–0.5)
EOSINOPHIL NFR BLD: 2.5 % (ref 0–8)
ERYTHROCYTE [DISTWIDTH] IN BLOOD BY AUTOMATED COUNT: 14.6 % (ref 11.5–14.5)
EST. GFR  (NO RACE VARIABLE): 44 ML/MIN/1.73 M^2
GLUCOSE SERPL-MCNC: 118 MG/DL (ref 70–110)
HCT VFR BLD AUTO: 38.2 % (ref 40–54)
HGB BLD-MCNC: 11.7 G/DL (ref 14–18)
IMM GRANULOCYTES # BLD AUTO: 0.02 K/UL (ref 0–0.04)
IMM GRANULOCYTES NFR BLD AUTO: 0.3 % (ref 0–0.5)
LYMPHOCYTES # BLD AUTO: 1.3 K/UL (ref 1–4.8)
LYMPHOCYTES NFR BLD: 18.6 % (ref 18–48)
MAGNESIUM SERPL-MCNC: 2.3 MG/DL (ref 1.6–2.6)
MCH RBC QN AUTO: 27.8 PG (ref 27–31)
MCHC RBC AUTO-ENTMCNC: 30.6 G/DL (ref 32–36)
MCV RBC AUTO: 91 FL (ref 82–98)
METHADONE UR QL SCN>300 NG/ML: NEGATIVE
MONOCYTES # BLD AUTO: 0.6 K/UL (ref 0.3–1)
MONOCYTES NFR BLD: 7.6 % (ref 4–15)
NEUTROPHILS # BLD AUTO: 5.1 K/UL (ref 1.8–7.7)
NEUTROPHILS NFR BLD: 70.9 % (ref 38–73)
NRBC BLD-RTO: 0 /100 WBC
OPIATES UR QL SCN: ABNORMAL
PCP UR QL SCN>25 NG/ML: NEGATIVE
PHOSPHATE SERPL-MCNC: 2.6 MG/DL (ref 2.7–4.5)
PLATELET # BLD AUTO: 185 K/UL (ref 150–450)
PMV BLD AUTO: 10.4 FL (ref 9.2–12.9)
POTASSIUM SERPL-SCNC: 5.1 MMOL/L (ref 3.5–5.1)
PROT SERPL-MCNC: 7.9 G/DL (ref 6–8.4)
RBC # BLD AUTO: 4.21 M/UL (ref 4.6–6.2)
SODIUM SERPL-SCNC: 139 MMOL/L (ref 136–145)
TOXICOLOGY INFORMATION: ABNORMAL
TROPONIN I SERPL DL<=0.01 NG/ML-MCNC: 0.16 NG/ML (ref 0–0.03)
TROPONIN I SERPL DL<=0.01 NG/ML-MCNC: 0.17 NG/ML (ref 0–0.03)
WBC # BLD AUTO: 7.2 K/UL (ref 3.9–12.7)

## 2024-11-15 PROCEDURE — 80307 DRUG TEST PRSMV CHEM ANLYZR: CPT | Performed by: PHYSICIAN ASSISTANT

## 2024-11-15 PROCEDURE — 99223 1ST HOSP IP/OBS HIGH 75: CPT | Mod: ,,, | Performed by: INTERNAL MEDICINE

## 2024-11-15 PROCEDURE — 80053 COMPREHEN METABOLIC PANEL: CPT

## 2024-11-15 PROCEDURE — 83735 ASSAY OF MAGNESIUM: CPT

## 2024-11-15 PROCEDURE — 84484 ASSAY OF TROPONIN QUANT: CPT | Performed by: STUDENT IN AN ORGANIZED HEALTH CARE EDUCATION/TRAINING PROGRAM

## 2024-11-15 PROCEDURE — 85379 FIBRIN DEGRADATION QUANT: CPT

## 2024-11-15 PROCEDURE — 84100 ASSAY OF PHOSPHORUS: CPT

## 2024-11-15 PROCEDURE — 36415 COLL VENOUS BLD VENIPUNCTURE: CPT | Mod: XB | Performed by: STUDENT IN AN ORGANIZED HEALTH CARE EDUCATION/TRAINING PROGRAM

## 2024-11-15 PROCEDURE — 25000003 PHARM REV CODE 250: Performed by: NURSE PRACTITIONER

## 2024-11-15 PROCEDURE — 36415 COLL VENOUS BLD VENIPUNCTURE: CPT

## 2024-11-15 PROCEDURE — 25000003 PHARM REV CODE 250: Performed by: INTERNAL MEDICINE

## 2024-11-15 PROCEDURE — 85025 COMPLETE CBC W/AUTO DIFF WBC: CPT

## 2024-11-15 PROCEDURE — A9540 TC99M MAA: HCPCS | Performed by: STUDENT IN AN ORGANIZED HEALTH CARE EDUCATION/TRAINING PROGRAM

## 2024-11-15 RX ORDER — ISOSORBIDE MONONITRATE 30 MG/1
30 TABLET, EXTENDED RELEASE ORAL DAILY
Status: DISCONTINUED | OUTPATIENT
Start: 2024-11-15 | End: 2024-11-15 | Stop reason: HOSPADM

## 2024-11-15 RX ORDER — ISOSORBIDE MONONITRATE 30 MG/1
30 TABLET, EXTENDED RELEASE ORAL DAILY
Qty: 30 TABLET | Refills: 11 | Status: SHIPPED | OUTPATIENT
Start: 2024-11-16 | End: 2025-11-16

## 2024-11-15 RX ADMIN — APIXABAN 5 MG: 2.5 TABLET, FILM COATED ORAL at 09:11

## 2024-11-15 RX ADMIN — GABAPENTIN 300 MG: 300 CAPSULE ORAL at 09:11

## 2024-11-15 RX ADMIN — PANTOPRAZOLE SODIUM 40 MG: 40 TABLET, DELAYED RELEASE ORAL at 09:11

## 2024-11-15 RX ADMIN — KIT FOR THE PREPARATION OF TECHNETIUM TC 99M ALBUMIN AGGREGATED 6 MILLICURIE: 2 INJECTION, POWDER, LYOPHILIZED, FOR SUSPENSION INTRAPERITONEAL; INTRAVENOUS at 11:11

## 2024-11-15 RX ADMIN — ISOSORBIDE MONONITRATE 30 MG: 30 TABLET, EXTENDED RELEASE ORAL at 09:11

## 2024-11-15 NOTE — ASSESSMENT & PLAN NOTE
"Patient's FSGs are controlled on current medication regimen.  Last A1c reviewed-   Lab Results   Component Value Date    HGBA1C 5.7 (H) 11/08/2024     Most recent fingerstick glucose reviewed-   No results for input(s): "POCTGLUCOSE" in the last 24 hours.    Current correctional scale  Low  Maintain anti-hyperglycemic dose as follows-   Antihyperglycemics (From admission, onward)    None        Hold Oral hypoglycemics while patient is in the hospital.  SSI  Accuchecks    " Conjuntivae and eyelids appear normal, Sclerae : White without injection

## 2024-11-15 NOTE — HPI
Mr. Green is a 69 year old male patient whose current medical conditions include PAF s/p PVI (on Eliquis), COPD, depression, DM type II, carotid artery disease, HTN, SANDRITA, prior cocaine abuse, and CAD (non-obs/small vessel disease per cath in 2016), and CHF who presented to [facility] ED on 11/13/24 at the recommendation of his PCP due to JOSE MARIA. Labs revealed creatinine of 2.1 and patient was subsequently treated with IV fluids and admitted for further observation. He was initially being d/c'd yesterday but had an episode of SOB along with substernal chest pain/heaviness. CXR, troponin, and EKG were ordered. CXR showed findings concerning for edema and he was given a dose of IV Lasix. Troponin came back at 0.190 and cardiology was consulted to assist with management. Patient seen and examined today, sitting up in bed. Feels ok. Complains of cough with dark-tinged sputum. Reports CP/neck pain with coughing and also notices chest pain intensifies if he changes positions. He denies any recent exertional CP symptoms. He claims compliance with his medications. Followed routinely in clinic. Repeat troponin flat/trending down. TTE this admission with EF of 50-55%, DD, mild AS. EKG unchanged.

## 2024-11-15 NOTE — ASSESSMENT & PLAN NOTE
-Troponin 0.190>0.164, chronically elevated due to demand ischemia  -CP more related to position/coughing--? Bronchitis---mgmt per primary team  -TTE with EF of 50-55%  -Continue OMT, Imdur added to maximize regimen

## 2024-11-15 NOTE — ASSESSMENT & PLAN NOTE
JOSE MARIA is likely due to pre-renal azotemia due to dehydration. Baseline creatinine is  1.1 . Most recent creatinine and eGFR are listed below.  Recent Labs     11/13/24  1854 11/14/24  0732 11/15/24  0652   CREATININE 2.1* 1.8* 1.7*   EGFRNORACEVR 34* 41* 44*        Plan  - JOSE MARIA is  being treated  - Avoid nephrotoxins and renally dose meds for GFR listed above  - Monitor urine output, serial BMP, and adjust therapy as needed

## 2024-11-15 NOTE — PLAN OF CARE
[facility] - Med Surg 3  Discharge Final Note    Primary Care Provider: Cm Polanco MD    Expected Discharge Date: 11/15/2024    Final Discharge Note (most recent)       Final Note - 11/15/24 1537          Final Note    Assessment Type Final Discharge Note     Anticipated Discharge Disposition Home-Health Care Svc        Post-Acute Status    Post-Acute Authorization Home Health     Home Health Status Referrals Sent     Discharge Delays None known at this time                     Important Message from Medicare  Important Message from Medicare regarding Discharge Appeal Rights: Given to patient/caregiver, Explained to patient/caregiver, Signed/date by patient/caregiver     Date IMM was signed: 11/15/24  Time IMM was signed: 1115    Contact Info       Cm Polanco MD   Specialty: Family Medicine   Relationship: PCP - General    [address] [facility]  [place] [address]   Phone: [phone]       Next Steps: Schedule an appointment as soon as possible for a visit    *[facility]   Specialty: Home Health Services, Home Therapy Services, Home Living Aide Services    [address] [facility] [address]  [place] [address]   Phone: [phone]       Next Steps: Follow up    Instructions: home health          Discharge home with [facility].

## 2024-11-15 NOTE — SUBJECTIVE & OBJECTIVE
Past Medical History:   Diagnosis Date    Arthritis     Asthma     Atrial fibrillation     Atrial fibrillation with RVR 08/07/2019    Carotid artery stenosis     CHF (congestive heart failure)     Chronic obstructive pulmonary disease 08/05/2020    Depression     Dermatomyositis     Diabetes mellitus, type 2 Diagnosed in 2000    Elevated coronary artery calcium score 02/14/2024    Elevated PSA     Follow up 07/30/2020    Hypertension     Myocardial infarction     2017    Sleep apnea        Past Surgical History:   Procedure Laterality Date    ABLATION OF ARRHYTHMOGENIC FOCUS FOR ATRIAL FIBRILLATION N/A 2/27/2020    Procedure: Ablation atrial fibrillation;  Surgeon: Gio Brown MD;  Location: [facility] EP LAB;  Service: Cardiology;  Laterality: N/A;  afib, DAMIEN (cx if SR), PVI, PITO, anes, MB, 3 Prep    CHOLECYSTECTOMY      CLOSURE OF WOUND Left 7/1/2020    Procedure: CLOSURE, WOUND;  Surgeon: Barb Veras DPM;  Location: [facility] OR;  Service: Podiatry;  Laterality: Left;    COLONOSCOPY N/A 3/4/2022    Procedure: COLONOSCOPY;  Surgeon: Yolis Freitas MD;  Location: [facility] ENDO;  Service: Endoscopy;  Laterality: N/A;    ESOPHAGOGASTRODUODENOSCOPY N/A 3/4/2022    Procedure: EGD (ESOPHAGOGASTRODUODENOSCOPY);  Surgeon: Yolis Freitas MD;  Location: [facility] ENDO;  Service: Endoscopy;  Laterality: N/A;    INJECTION OF ANESTHETIC AGENT INTO SACROILIAC JOINT Left 3/24/2021    Procedure: Left BLOCK, SACROILIAC JOINT and bilateral rhomboid TPI;  Surgeon: Dillan Tsai MD;  Location: [facility] PAIN MGT;  Service: Pain Management;  Laterality: Left;    INTRALUMINAL GASTROINTESTINAL TRACT IMAGING VIA CAPSULE N/A 3/22/2022    Procedure: IMAGING PROCEDURE, GI TRACT, INTRALUMINAL, VIA CAPSULE;  Surgeon: Justice Martinez RN;  Location: [facility] ENDO;  Service: Endoscopy;  Laterality: N/A;    REVERSE TOTAL SHOULDER ARTHROPLASTY Left 1/10/2022    Procedure: ARTHROPLASTY, SHOULDER, TOTAL, REVERSE;  Surgeon: Anthony Alvarado MD;   Location: [facility] OR;  Service: Orthopedics;  Laterality: Left;  left reverse total shoulder arthroplasty     SELECTIVE INJECTION OF ANESTHETIC AGENT AROUND LUMBAR SPINAL NERVE ROOT BY TRANSFORAMINAL APPROACH Left 6/11/2020    Procedure: BLOCK, SPINAL NERVE ROOT, LUMBAR, SELECTIVE, TRANSFORAMINAL APPROACH Left L4-5, L5-S1 TFESI with RN IV sedation;  Surgeon: Dillan Tsai MD;  Location: [facility];  Service: Pain Management;  Laterality: Left;    TOE AMPUTATION Left 6/29/2020    Procedure: AMPUTATION, TOE;  Surgeon: Barb Veras DPM;  Location: [facility] OR;  Service: Podiatry;  Laterality: Left;  great toe       Review of patient's allergies indicates:   Allergen Reactions    Protamine Hives     Urticaria, possible upper airway swelling       No current facility-administered medications on file prior to encounter.     Current Outpatient Medications on File Prior to Encounter   Medication Sig    allopurinoL (ZYLOPRIM) 100 MG tablet TAKE 1 TABLET ONE TIME DAILY    atorvastatin (LIPITOR) 40 MG tablet Take 1 tablet (40 mg total) by mouth every evening.    ELIQUIS 5 mg Tab TAKE 1 TABLET TWICE DAILY    empagliflozin (JARDIANCE) 25 mg tablet Take 1 tablet (25 mg total) by mouth once daily.    furosemide (LASIX) 40 MG tablet Take 40 mg by mouth 2 (two) times daily. 2.5 tablets twice daily    gabapentin (NEURONTIN) 800 MG tablet Take 1 tablet (800 mg total) by mouth 3 (three) times daily.    pantoprazole (PROTONIX) 40 MG tablet Take 1 tablet (40 mg total) by mouth once daily.    sertraline (ZOLOFT) 100 MG tablet Take 1 tablet (100 mg total) by mouth every evening.    traMADoL (ULTRAM) 50 mg tablet Take 50 mg by mouth every 6 (six) hours as needed.    traZODone (DESYREL) 100 MG tablet TAKE 2 TABLETS EVERY EVENING    albuterol (PROVENTIL) 2.5 mg /3 mL (0.083 %) nebulizer solution INHALE THE CONTENTS OF 1 VIAL VIA NEBULIZER EVERY 4 TO 6 HOURS AS NEEDED FOR WHEEZING OR FOR SHORTNESS OF BREATH    albuterol sulfate 90 mcg/actuation  "aebs Inhale 2 puffs into the lungs every 4 (four) hours as needed (WHEEZING OR SHORTNESS OF BREATH).    amLODIPine (NORVASC) 2.5 MG tablet Take 1 tablet (2.5 mg total) by mouth once daily.    blood sugar diagnostic (TRUE METRIX GLUCOSE TEST STRIP) Strp Inject 1 each into the skin 4 (four) times daily.    blood-glucose meter Misc Use as instructed to test blood glucose meter daily.    brinzolamide-brimonidine (SIMBRINZA) 1-0.2 % DrpS Place 1 drop into both eyes 3 (three) times daily.    cyanocobalamin (VITAMIN B-12) 1000 MCG tablet Take 1 tablet by mouth once daily.    DEXCOM G7 SENSOR Shefali Change sensor every 10 days    EPINEPHrine (EPIPEN) 0.3 mg/0.3 mL AtIn Inject 0.3 mLs (0.3 mg total) into the muscle once. for 1 dose    INV allopurinol tablet Take 1 tablet by mouth once daily.    lancets (TRUEPLUS LANCETS) 33 gauge Misc Use to check blood sugar twice a day    lancets (TRUEPLUS LANCETS) 33 gauge Misc USE AS DIRECTED TO TEST BLOOD SUGAR FOUR TIMES DAILY    latanoprost 0.005 % ophthalmic solution Place 1 drop into both eyes every evening.    lisinopriL (PRINIVIL,ZESTRIL) 30 MG tablet TAKE 1 TABLET EVERY DAY. (Patient not taking: Reported on 11/12/2024)    lisinopriL 10 MG tablet Take 10 mg by mouth.    methocarbamoL (ROBAXIN) 750 MG Tab Take 750 mg by mouth 2 (two) times daily as needed.    multivitamin-iron-folic acid (SENTRY) Tab Take 1 tablet by mouth once daily.    omalizumab (XOLAIR) 150 mg/mL injection Inject 2 mLs (300 mg total) into the skin every 14 (fourteen) days.    omalizumab (XOLAIR) 75 mg/0.5 mL injection Inject 0.5 mLs (75 mg total) into the skin every 14 (fourteen) days.    pen needle, diabetic (BD ULTRA-FINE SHORT PEN NEEDLE) 31 gauge x 5/16" Ndle 1 each by Misc.(Non-Drug; Combo Route) route 3 (three) times daily.    pep injection Inject 0.15 ml as directed     For compounding pharmacy use:   Add PAPAVERINE 30 mg  Add PHENTOLAMINE 1 mg  Add ALPROSTADIL 20 mcg    pregabalin (LYRICA) 100 MG capsule " Take 1 capsule (100 mg total) by mouth 3 (three) times daily.    ranolazine (RANEXA) 1,000 mg Tb12 TAKE 1 TABLET TWICE DAILY    semaglutide (OZEMPIC) 2 mg/dose (8 mg/3 mL) PnIj Inject 2 mg into the skin every 7 days.    topiramate (TOPAMAX) 25 MG tablet Take 1 tablet (25 mg total) by mouth 2 (two) times daily.    VIOS AEROSOL DELIVERY SYSTEM Shefali USE AS DIRESTED    [DISCONTINUED] diclofenac sodium (VOLTAREN) 1 % Gel APPLY 2 GRAMS TOPICALLY FOUR TIMES DAILY (Patient not taking: Reported on 10/11/2024)     Family History       Problem Relation (Age of Onset)    Cancer Maternal Grandfather    Cataracts Mother, Father, Sister    Diabetes Mother, Sister, Maternal Aunt    Glaucoma Mother, Sister, Maternal Aunt    Hypertension Mother    No Known Problems Brother, Daughter, Son    Stroke Father, Sister          Tobacco Use    Smoking status: Never    Smokeless tobacco: Never   Substance and Sexual Activity    Alcohol use: Yes     Comment: rare, maybe holidays    Drug use: Not Currently     Types: Cocaine    Sexual activity: Not Currently     Partners: Female     Review of Systems   Constitutional: Positive for malaise/fatigue.   HENT: Negative.     Eyes: Negative.    Cardiovascular:  Positive for chest pain and dyspnea on exertion.   Respiratory:  Positive for cough, shortness of breath, sputum production and wheezing.    Endocrine: Negative.    Hematologic/Lymphatic: Negative.    Skin: Negative.    Musculoskeletal:  Positive for arthritis and joint pain.   Gastrointestinal: Negative.    Genitourinary: Negative.    Neurological: Negative.    Psychiatric/Behavioral: Negative.     Allergic/Immunologic: Negative.      Objective:     Vital Signs (Most Recent):  Temp: 97.8 °F (36.6 °C) (11/15/24 0737)  Pulse: 78 (11/15/24 0750)  Resp: 18 (11/15/24 0737)  BP: 130/65 (11/15/24 0737)  SpO2: (!) 92 % (11/15/24 0737) Vital Signs (24h Range):  Temp:  [97.4 °F (36.3 °C)-98.7 °F (37.1 °C)] 97.8 °F (36.6 °C)  Pulse:  [66-81] 78  Resp:   [16-22] 18  SpO2:  [89 %-98 %] 92 %  BP: (107-183)/(55-88) 130/65     Weight: 86.2 kg (190 lb)  Body mass index is 31.62 kg/m².    SpO2: (!) 92 %         Intake/Output Summary (Last 24 hours) at 11/15/2024 1142  Last data filed at 11/15/2024 0438  Gross per 24 hour   Intake 700 ml   Output 4400 ml   Net -3700 ml       Lines/Drains/Airways       Peripheral Intravenous Line  Duration                  Peripheral IV - Single Lumen 11/13/24 20 G Anterior;Distal;Right Upper Arm 2 days                     Physical Exam  Vitals and nursing note reviewed.   Constitutional:       Appearance: Normal appearance.   HENT:      Head: Normocephalic and atraumatic.   Eyes:      Pupils: Pupils are equal, round, and reactive to light.   Cardiovascular:      Rate and Rhythm: Normal rate and regular rhythm.      Heart sounds: S1 normal and S2 normal. No murmur heard.  Pulmonary:      Comments: Diminished at bases, upper airway wheezing  Musculoskeletal:      Right lower leg: No edema.      Left lower leg: No edema.   Skin:     General: Skin is warm and dry.   Neurological:      General: No focal deficit present.      Mental Status: He is oriented to person, place, and time.   Psychiatric:         Mood and Affect: Mood normal.         Behavior: Behavior normal.          Significant Labs: CMP   Recent Labs   Lab 11/13/24 1854 11/14/24  0732 11/15/24  0652    142 139   K 4.1 4.1 5.1    112* 107   CO2 27 23 20*   GLU 98 105 118*   BUN 33* 31* 28*   CREATININE 2.1* 1.8* 1.7*   CALCIUM 8.7 7.8* 8.8   PROT 7.4 6.2 7.9   ALBUMIN 3.3* 2.8* 3.3*   BILITOT 0.2 0.2 0.4   ALKPHOS 94 75 87   AST 16 16 26   ALT 15 13 17   ANIONGAP 10 7* 12   , CBC   Recent Labs   Lab 11/13/24 1854 11/14/24  0733 11/15/24  0702   WBC 7.09 5.90 7.20   HGB 11.9* 10.7* 11.7*   HCT 36.9* 35.1* 38.2*    172 185   , Troponin   Recent Labs   Lab 11/14/24  1233 11/15/24  0815   TROPONINI 0.190* 0.164*   , and All pertinent lab results from the last 24  hours have been reviewed.    Significant Imaging: Echocardiogram: Transthoracic echo (TTE) complete (Cupid Only):   Results for orders placed or performed during the hospital encounter of 11/13/24   Echo   Result Value Ref Range    BSA 1.99 m2    LVOT stroke volume 65.1 cm3    LVIDd 4.5 3.5 - 6.0 cm    LV Systolic Volume 42.67 mL    LV Systolic Volume Index 22.0 mL/m2    LVIDs 3.3 2.1 - 4.0 cm    LV Diastolic Volume 91.14 mL    LV Diastolic Volume Index 46.98 mL/m2    Left Ventricular End Systolic Volume by Teichholz Method 42.67 mL    Left Ventricular End Diastolic Volume by Teichholz Method 91.14 mL    IVS 1.6 (A) 0.6 - 1.1 cm    LVOT diameter 2.1 cm    LVOT area 3.5 cm2    FS 26.7 (A) 28 - 44 %    Left Ventricle Relative Wall Thickness 0.71 cm    PW 1.6 (A) 0.6 - 1.1 cm    LV mass 304.6 g    LV Mass Index 157.0 g/m2    MV Peak E Mathew 1.13 m/s    TDI LATERAL 0.07 m/s    TDI SEPTAL 0.07 m/s    E/E' ratio 16.14 m/s    MV Peak A Mathew 0.75 m/s    E/A ratio 1.51     IVRT 57.09 msec    E wave deceleration time 256.48 msec    LV SEPTAL E/E' RATIO 16.14 m/s    LV LATERAL E/E' RATIO 16.14 m/s    LVOT peak mathew 0.9 m/s    Left Ventricular Outflow Tract Mean Velocity 0.73 cm/s    Left Ventricular Outflow Tract Mean Gradient 2.14 mmHg    RVOT peak VTI 21.3 cm    TAPSE 1.99 cm    LA size 4.37 cm    Left Atrium Minor Axis 5.22 cm    Left Atrium Major Axis 5.25 cm    RA Major Axis 3.57 cm    AV mean gradient 5.9 mmHg    AV peak gradient 10.2 mmHg    Ao peak mathew 1.6 m/s    Ao VTI 33.9 cm    LVOT peak VTI 18.8 cm    AV valve area 1.9 cm²    AV Velocity Ratio 0.56     AV index (prosthetic) 0.55     PRAMOD by Velocity Ratio 1.9 cm²    MV mean gradient 2 mmHg    MV peak gradient 5 mmHg    MV stenosis pressure 1/2 time 74.38 ms    MV valve area p 1/2 method 2.96 cm2    MV valve area by continuity eq 1.97 cm2    MV VTI 33.0 cm    PV mean gradient 3 mmHg    RVOT peak mathew 1.07 m/s    Ao root annulus 3.46 cm    STJ 3.36 cm    Ascending aorta  3.28 cm    IVC diameter 1.77 cm    Mean e' 0.07 m/s    ZLVIDS -0.21     ZLVIDD -2.03     MAYDA 30.1 mL/m2    LA Vol 58.34 cm3    LA WIDTH 3.0 cm    RA Width 2.6 cm    Est. RA pres 3 mmHg    Narrative      Left Ventricle: The left ventricle is normal in size. There is moderate   concentric hypertrophy. There is low normal systolic function with a   visually estimated ejection fraction of 50 - 55%. There is normal   diastolic function.    Right Ventricle: Normal right ventricular cavity size. Systolic   function is normal.    Left Atrium: Left atrium is mildly dilated.    Aortic Valve: There is mild stenosis. Aortic valve area by VTI is 1.9   cm². Aortic valve peak velocity is 1.6 m/s. Mean gradient is 5.9 mmHg. The   dimensionless index is 0.55.    Mitral Valve: There is mild bileaflet sclerosis. There is mild   posterior mitral annular calcification present. There is mild   regurgitation.    IVC/SVC: Normal venous pressure at 3 mmHg.      and EKG: Reviewed

## 2024-11-15 NOTE — SUBJECTIVE & OBJECTIVE
Interval History: No acute events overnight. Patient reporting midsternal chest pain with deep breaths. He is currently on room air at 93%. Afebrile, hemodynamically stable. Cardiology consulted. V/Q scan pending.     Review of Systems   Constitutional:  Positive for activity change.   HENT:  Negative for sore throat and trouble swallowing.    Eyes:  Negative for visual disturbance.   Respiratory:  Positive for shortness of breath.    Cardiovascular:  Positive for chest pain.   Gastrointestinal:  Negative for nausea and vomiting.   Endocrine: Negative for cold intolerance and heat intolerance.   Genitourinary:  Negative for difficulty urinating and urgency.   Musculoskeletal: Negative.  Negative for gait problem and myalgias.   Skin:  Negative for color change.   Allergic/Immunologic: Negative for immunocompromised state.   Neurological:  Negative for dizziness, syncope and headaches.   Hematological:  Does not bruise/bleed easily.     Objective:     Vital Signs (Most Recent):  Temp: 97.8 °F (36.6 °C) (11/15/24 0737)  Pulse: 78 (11/15/24 0750)  Resp: 18 (11/15/24 0737)  BP: 130/65 (11/15/24 0737)  SpO2: (!) 92 % (11/15/24 0737) Vital Signs (24h Range):  Temp:  [97.4 °F (36.3 °C)-98.7 °F (37.1 °C)] 97.8 °F (36.6 °C)  Pulse:  [66-81] 78  Resp:  [16-22] 18  SpO2:  [89 %-98 %] 92 %  BP: (107-183)/(55-88) 130/65     Weight: 86.2 kg (190 lb)  Body mass index is 31.62 kg/m².    Intake/Output Summary (Last 24 hours) at 11/15/2024 1118  Last data filed at 11/15/2024 0438  Gross per 24 hour   Intake 700 ml   Output 4400 ml   Net -3700 ml         Physical Exam  Vitals and nursing note reviewed.   Constitutional:       Appearance: He is ill-appearing.   HENT:      Head: Normocephalic and atraumatic.      Right Ear: External ear normal.      Left Ear: External ear normal.      Nose: Nose normal.      Mouth/Throat:      Mouth: Mucous membranes are moist.      Pharynx: Oropharynx is clear.   Eyes:      Conjunctiva/sclera:  Conjunctivae normal.      Pupils: Pupils are equal, round, and reactive to light.   Cardiovascular:      Rate and Rhythm: Normal rate and regular rhythm.      Pulses: Normal pulses.      Heart sounds: Normal heart sounds.   Pulmonary:      Effort: Pulmonary effort is normal. No respiratory distress.      Breath sounds: Normal breath sounds. No wheezing or rales.   Abdominal:      General: Bowel sounds are normal.      Palpations: Abdomen is soft.   Musculoskeletal:         General: Normal range of motion.      Cervical back: Normal range of motion and neck supple.      Right lower leg: No edema.      Left lower leg: No edema.   Skin:     General: Skin is warm and dry.      Capillary Refill: Capillary refill takes less than 2 seconds.   Neurological:      General: No focal deficit present.      Mental Status: He is alert and oriented to person, place, and time.      Motor: No weakness.      Gait: Gait normal.   Psychiatric:         Mood and Affect: Mood normal.         Behavior: Behavior normal.             Significant Labs: All pertinent labs within the past 24 hours have been reviewed.    Significant Imaging: I have reviewed all pertinent imaging results/findings within the past 24 hours.

## 2024-11-15 NOTE — PROGRESS NOTES
[facility]  [facility]  Progress Note    Patient Name: Crow Green  MRN: [id number]  Patient Class: IP- Inpatient   Admission Date: 11/13/2024  Length of Stay: 2 days  Attending Physician: Eamon Olmos MD  Primary Care Provider: Cm Polanco MD        Subjective:     Principal Problem:JOSE MARIA (acute kidney injury)        HPI:  Crow Green is a 69 y.o. male with a PMH  has a past medical history of Arthritis, Asthma, Atrial fibrillation, Atrial fibrillation with RVR (08/07/2019), Carotid artery stenosis, CHF (congestive heart failure), Chronic obstructive pulmonary disease (08/05/2020), Depression, Dermatomyositis, Diabetes mellitus, type 2 (Diagnosed in 2000), Elevated coronary artery calcium score (02/14/2024), Elevated PSA, Follow up (07/30/2020), Hypertension, Myocardial infarction, and Sleep apnea.presented to the Emergency Department for evaluation of abnormal kidney labs from routine check-up. Pt denies any recent heat exposure. Pt denies usage of any medications (ASA/NSAIDs) besides Tylenol. Patient denies any dysuria, hematuria, abd pain, CP, SOB, dizziness, and all other sxs at this time. No prior Tx. No further complaints or concerns at this time.       ER workup revealed BUN/creatinine of 2.1/74 (previously 2.7/25 on 11/05/2024).  Baseline creatinine level 1.1.  Ultrasound of retroperitoneal negative for acute findings.  Vital signs stable.  Patient received 1 L normal saline in ED.  Hospital Medicine consulted to admit patient for JOSE MARIA.  Patient in agreement with treatment plan.  Patient will be admitted under inpatient status.    PCP: Cm Polanco      Overview/Hospital Course:  69 y.o male with a PMH of arthritis, asthma, atrial fibrillation with RVR, carotid artery stenosis, CHF, COPD, depression, dermatomyositis, type 2 DM, elevated PSA, HTN, MI, and sleep apnea admitted for treatment of acute kidney injury. Patient was referred to the ED by his PCP after  abnormal routine lab work. Afebrile overnight, hemodynamically stable. CBC unremarkable and BUN/Cr 28/1.7, which has improved since admissions. Patient denies dysuria, flank pain, hematuria, difficulty urinating, fever, chills, CP, nausea, vomiting, or lower abdominal pain. Discussed discharge planning with patient yesterday and he was agreeable, however, prior to discharge yesterday, patient reported crushing sternal chest pain and shortness of breath.  Oxygen saturations dropped high 80s while one room air, but increased to 92% on 4 L NC.  Patient received once dose IV morphine and IV lasix 40 mg . STAT chest x-ray showed mild to moderate vascular congestion, troponin 0.190 and EKG showed 1st degree AV block with elevated QTC and wide QRS. Patient was weaned down to 2 L at 98% saturation. Cardiology consulted. D-dimer within normal limits. V/Q scan low probability for PE.    Interval History: No acute events overnight. Patient reporting midsternal chest pain with deep breaths. He is currently on room air at 93%. Afebrile, hemodynamically stable. Cardiology consulted. V/Q scan pending.     Review of Systems   Constitutional:  Positive for activity change.   HENT:  Negative for sore throat and trouble swallowing.    Eyes:  Negative for visual disturbance.   Respiratory:  Positive for shortness of breath.    Cardiovascular:  Positive for chest pain.   Gastrointestinal:  Negative for nausea and vomiting.   Endocrine: Negative for cold intolerance and heat intolerance.   Genitourinary:  Negative for difficulty urinating and urgency.   Musculoskeletal: Negative.  Negative for gait problem and myalgias.   Skin:  Negative for color change.   Allergic/Immunologic: Negative for immunocompromised state.   Neurological:  Negative for dizziness, syncope and headaches.   Hematological:  Does not bruise/bleed easily.     Objective:     Vital Signs (Most Recent):  Temp: 97.8 °F (36.6 °C) (11/15/24 0737)  Pulse: 78 (11/15/24  0750)  Resp: 18 (11/15/24 0737)  BP: 130/65 (11/15/24 0737)  SpO2: (!) 92 % (11/15/24 0737) Vital Signs (24h Range):  Temp:  [97.4 °F (36.3 °C)-98.7 °F (37.1 °C)] 97.8 °F (36.6 °C)  Pulse:  [66-81] 78  Resp:  [16-22] 18  SpO2:  [89 %-98 %] 92 %  BP: (107-183)/(55-88) 130/65     Weight: 86.2 kg (190 lb)  Body mass index is 31.62 kg/m².    Intake/Output Summary (Last 24 hours) at 11/15/2024 1118  Last data filed at 11/15/2024 0438  Gross per 24 hour   Intake 700 ml   Output 4400 ml   Net -3700 ml         Physical Exam  Vitals and nursing note reviewed.   Constitutional:       Appearance: He is ill-appearing.   HENT:      Head: Normocephalic and atraumatic.      Right Ear: External ear normal.      Left Ear: External ear normal.      Nose: Nose normal.      Mouth/Throat:      Mouth: Mucous membranes are moist.      Pharynx: Oropharynx is clear.   Eyes:      Conjunctiva/sclera: Conjunctivae normal.      Pupils: Pupils are equal, round, and reactive to light.   Cardiovascular:      Rate and Rhythm: Normal rate and regular rhythm.      Pulses: Normal pulses.      Heart sounds: Normal heart sounds.   Pulmonary:      Effort: Pulmonary effort is normal. No respiratory distress.      Breath sounds: Normal breath sounds. No wheezing or rales.   Abdominal:      General: Bowel sounds are normal.      Palpations: Abdomen is soft.   Musculoskeletal:         General: Normal range of motion.      Cervical back: Normal range of motion and neck supple.      Right lower leg: No edema.      Left lower leg: No edema.   Skin:     General: Skin is warm and dry.      Capillary Refill: Capillary refill takes less than 2 seconds.   Neurological:      General: No focal deficit present.      Mental Status: He is alert and oriented to person, place, and time.      Motor: No weakness.      Gait: Gait normal.   Psychiatric:         Mood and Affect: Mood normal.         Behavior: Behavior normal.             Significant Labs: All pertinent labs  "within the past 24 hours have been reviewed.    Significant Imaging: I have reviewed all pertinent imaging results/findings within the past 24 hours.    Assessment/Plan:      * JOSE MARIA (acute kidney injury)  JOSE MARIA is likely due to pre-renal azotemia due to dehydration. Baseline creatinine is  1.1 . Most recent creatinine and eGFR are listed below.  Recent Labs     11/13/24  1854 11/14/24  0732 11/15/24  0652   CREATININE 2.1* 1.8* 1.7*   EGFRNORACEVR 34* 41* 44*        Plan  - JOSE MARIA is  being treated  - Avoid nephrotoxins and renally dose meds for GFR listed above  - Monitor urine output, serial BMP, and adjust therapy as needed      Chronic combined systolic and diastolic heart failure  Patient is identified as having Combined Systolic and Diastolic heart failure that is Chronic. CHF is currently controlled. Latest ECHO performed and demonstrates- Results for orders placed during the hospital encounter of 07/30/22    Echo    Interpretation Summary  · The left ventricle is normal in size with severe concentric hypertrophy and mildly decreased systolic function.  · Mild left atrial enlargement.  · The estimated ejection fraction is 40 - 45%.  · Grade III left ventricular diastolic dysfunction.  · Normal right ventricular size with mildly reduced right ventricular systolic function.  · Mild tricuspid regurgitation.  · Mild pulmonic regurgitation.  · Elevated central venous pressure (15 mmHg).  · The estimated PA systolic pressure is 45 mmHg.  · There is pulmonary hypertension.  . Continue Beta Blocker ACE/ARB Furosemide and monitor clinical status closely. Monitor on telemetry. Patient is off CHF pathway.  Monitor strict Is&Os and daily weights.  Place on fluid restriction of 2 L. Continue to stress to patient importance of self efficacy and  on diet for CHF. Last BNP reviewed- and noted below No results for input(s): "BNP", "BNPTRIAGEBLO" in the last 168 hours..            Chest pain  Prior to discharge yesterday, " "patient reported crushing sternal chest pain and shortness of breath.  Oxygen saturations dropped high 80s while one room air, but increased to 92% on 4 L NC.  Patient received once dose IV morphine and IV lasix 40 mg .    - Troponin 0.190>0.164>0.169  - CXR: mild vascular congestion  - EKst degree AV block with elevated QTC and wide QRS  - Echo: 50-55%  - D-dimer 0.30, V/Q scan : low probability for PE  - Cardiology following          PAF (paroxysmal atrial fibrillation)  Patient has paroxysmal (<7 days) atrial fibrillation. Patient is currently in sinus rhythm. [id number] Score: 3. The patients heart rate in the last 24 hours is as follows:  Pulse  Min: 68  Max: 86     Antiarrhythmics       Anticoagulants  enoxaparin injection 40 mg, Every 24 hours, Subcutaneous    Plan  - Replete lytes with a goal of K>4, Mg >2  - Patient is anticoagulated, see medications listed above.  - Patient's afib is currently controlled      Type 2 diabetes mellitus  Patient's FSGs are controlled on current medication regimen.  Last A1c reviewed-   Lab Results   Component Value Date    HGBA1C 5.7 (H) 2024     Most recent fingerstick glucose reviewed-   No results for input(s): "POCTGLUCOSE" in the last 24 hours.    Current correctional scale  Low  Maintain anti-hyperglycemic dose as follows-   Antihyperglycemics (From admission, onward)      None          Hold Oral hypoglycemics while patient is in the hospital.  SSI  Accuchecks      Elevated troponin  - Cardiology following      Hyperlipidemia associated with type 2 diabetes mellitus  Patient is chronically on statin.will continue for now. Last Lipid Panel:   Lab Results   Component Value Date    CHOL 144 2024    HDL 39 (L) 2024    LDLCALC 73.8 2024    TRIG 156 (H) 2024    CHOLHDL 27.1 2024     Plan:  -Continue home medication  -low fat/low calorie diet        Nonobstructive coronary artery disease of native artery of native heart  Patient with " known CAD which is controlled Will continue Eliquis and Statin and monitor for S/Sx of angina/ACS. Continue to monitor on telemetry.     Diabetic polyneuropathy  Compliant with gabapentin.  Denies any new or worsening symptoms.  Plan:   -continue gabapentin      Essential hypertension  Chronic, controlled.  Latest blood pressure and vitals reviewed-   Temp:  [97.6 °F (36.4 °C)-98.6 °F (37 °C)]   Pulse:  [68-86]   Resp:  [18]   BP: (147-162)/(70-97)   SpO2:  [97 %-100 %] .   Home meds for hypertension were reviewed and noted below.   Hypertension Medications               amLODIPine (NORVASC) 2.5 MG tablet Take 1 tablet (2.5 mg total) by mouth once daily.    furosemide (LASIX) 40 MG tablet Take 40 mg by mouth 2 (two) times daily. 2.5 tablets twice daily    lisinopriL (PRINIVIL,ZESTRIL) 30 MG tablet TAKE 1 TABLET EVERY DAY.    lisinopriL 10 MG tablet Take 10 mg by mouth.    pep injection Inject 0.15 ml as directed     For compounding pharmacy use:   Add PAPAVERINE 30 mg  Add PHENTOLAMINE 1 mg  Add ALPROSTADIL 20 mcg            While in the hospital, will manage blood pressure as follows; Continue home antihypertensive regimen    Will utilize p.r.n. blood pressure medication only if patient's blood pressure greater than  180/110 and he develops symptoms such as worsening chest pain or shortness of breath.        VTE Risk Mitigation (From admission, onward)           Ordered     apixaban tablet 5 mg  2 times daily         11/14/24 0140     IP VTE HIGH RISK PATIENT  Once         11/13/24 2038     Place sequential compression device  Until discontinued         11/13/24 2038                    Discharge Planning   REJI: 11/14/2024     Code Status: Full Code   Is the patient medically ready for discharge?:     Reason for patient still in hospital (select all that apply): Patient trending condition  Discharge Plan A: Home Health                  SUKHJINDER Alejandro  Department of Hospital Medicine   [facility]

## 2024-11-15 NOTE — ASSESSMENT & PLAN NOTE
Prior to discharge yesterday, patient reported crushing sternal chest pain and shortness of breath.  Oxygen saturations dropped high 80s while one room air, but increased to 92% on 4 L NC.  Patient received once dose IV morphine and IV lasix 40 mg .    - Troponin 0.190>0.164>0.169  - CXR: mild vascular congestion  - EKst degree AV block with elevated QTC and wide QRS  - Echo: 50-55%  - D-dimer 0.30, V/Q scan : low probability for PE  - Cardiology following

## 2024-11-15 NOTE — CONSULTS
[facility]  Cardiology  Consult Note    Patient Name: Crow Green  MRN: [id number]  Admission Date: 11/13/2024  Hospital Length of Stay: 2 days  Code Status: Full Code   Attending Provider: Eamon Olmos MD   Consulting Provider: Evelyn Bird PA-C  Primary Care Physician: Cm Polanco MD  Principal Problem:JOSE MARIA (acute kidney injury)    Patient information was obtained from patient, past medical records, and ER records.     Inpatient consult to Cardiology  Consult performed by: Evelyn Bird PA-C  Consult ordered by: Eamon Olmos MD        Subjective:     Chief Complaint:  Abnormal labs    HPI:   Mr. Green is a 69 year old male patient whose current medical conditions include PAF s/p PVI (on Eliquis), COPD, depression, DM type II, carotid artery disease, HTN, SANDRITA, prior cocaine abuse, and CAD (non-obs/small vessel disease per cath in 2016), and CHF who presented to [facility] ED on 11/13/24 at the recommendation of his PCP due to JOSE MARIA. Labs revealed creatinine of 2.1 and patient was subsequently treated with IV fluids and admitted for further observation. He was initially being d/c'd yesterday but had an episode of SOB along with substernal chest pain/heaviness. CXR, troponin, and EKG were ordered. CXR showed findings concerning for edema and he was given a dose of IV Lasix. Troponin came back at 0.190 and cardiology was consulted to assist with management. Patient seen and examined today, sitting up in bed. Feels ok. Complains of cough with dark-tinged sputum. Reports CP/neck pain with coughing and also notices chest pain intensifies if he changes positions. He denies any recent exertional CP symptoms. He claims compliance with his medications. Followed routinely in clinic. Repeat troponin flat/trending down. TTE this admission with EF of 50-55%, DD, mild AS. EKG unchanged.        Past Medical History:   Diagnosis Date    Arthritis     Asthma     Atrial fibrillation     Atrial  fibrillation with RVR 08/07/2019    Carotid artery stenosis     CHF (congestive heart failure)     Chronic obstructive pulmonary disease 08/05/2020    Depression     Dermatomyositis     Diabetes mellitus, type 2 Diagnosed in 2000    Elevated coronary artery calcium score 02/14/2024    Elevated PSA     Follow up 07/30/2020    Hypertension     Myocardial infarction     2017    Sleep apnea        Past Surgical History:   Procedure Laterality Date    ABLATION OF ARRHYTHMOGENIC FOCUS FOR ATRIAL FIBRILLATION N/A 2/27/2020    Procedure: Ablation atrial fibrillation;  Surgeon: Gio Brown MD;  Location: [facility] EP LAB;  Service: Cardiology;  Laterality: N/A;  afib, DAMIEN (cx if SR), PVI, PITO, anes, MB, 3 Prep    CHOLECYSTECTOMY      CLOSURE OF WOUND Left 7/1/2020    Procedure: CLOSURE, WOUND;  Surgeon: Barb Veras DPM;  Location: [facility] OR;  Service: Podiatry;  Laterality: Left;    COLONOSCOPY N/A 3/4/2022    Procedure: COLONOSCOPY;  Surgeon: Yolis Freitas MD;  Location: [facility] ENDO;  Service: Endoscopy;  Laterality: N/A;    ESOPHAGOGASTRODUODENOSCOPY N/A 3/4/2022    Procedure: EGD (ESOPHAGOGASTRODUODENOSCOPY);  Surgeon: Yolis Freitas MD;  Location: [facility] ENDO;  Service: Endoscopy;  Laterality: N/A;    INJECTION OF ANESTHETIC AGENT INTO SACROILIAC JOINT Left 3/24/2021    Procedure: Left BLOCK, SACROILIAC JOINT and bilateral rhomboid TPI;  Surgeon: Dillan Tsai MD;  Location: [facility] PAIN MGT;  Service: Pain Management;  Laterality: Left;    INTRALUMINAL GASTROINTESTINAL TRACT IMAGING VIA CAPSULE N/A 3/22/2022    Procedure: IMAGING PROCEDURE, GI TRACT, INTRALUMINAL, VIA CAPSULE;  Surgeon: Justice Martinez RN;  Location: [facility] ENDO;  Service: Endoscopy;  Laterality: N/A;    REVERSE TOTAL SHOULDER ARTHROPLASTY Left 1/10/2022    Procedure: ARTHROPLASTY, SHOULDER, TOTAL, REVERSE;  Surgeon: Anthony Alvarado MD;  Location: [facility] OR;  Service: Orthopedics;  Laterality: Left;  left reverse total shoulder arthroplasty      SELECTIVE INJECTION OF ANESTHETIC AGENT AROUND LUMBAR SPINAL NERVE ROOT BY TRANSFORAMINAL APPROACH Left 6/11/2020    Procedure: BLOCK, SPINAL NERVE ROOT, LUMBAR, SELECTIVE, TRANSFORAMINAL APPROACH Left L4-5, L5-S1 TFESI with RN IV sedation;  Surgeon: Dillan Tsai MD;  Location: [facility];  Service: Pain Management;  Laterality: Left;    TOE AMPUTATION Left 6/29/2020    Procedure: AMPUTATION, TOE;  Surgeon: Barb Veras DPM;  Location: [facility] OR;  Service: Podiatry;  Laterality: Left;  great toe       Review of patient's allergies indicates:   Allergen Reactions    Protamine Hives     Urticaria, possible upper airway swelling       No current facility-administered medications on file prior to encounter.     Current Outpatient Medications on File Prior to Encounter   Medication Sig    allopurinoL (ZYLOPRIM) 100 MG tablet TAKE 1 TABLET ONE TIME DAILY    atorvastatin (LIPITOR) 40 MG tablet Take 1 tablet (40 mg total) by mouth every evening.    ELIQUIS 5 mg Tab TAKE 1 TABLET TWICE DAILY    empagliflozin (JARDIANCE) 25 mg tablet Take 1 tablet (25 mg total) by mouth once daily.    furosemide (LASIX) 40 MG tablet Take 40 mg by mouth 2 (two) times daily. 2.5 tablets twice daily    gabapentin (NEURONTIN) 800 MG tablet Take 1 tablet (800 mg total) by mouth 3 (three) times daily.    pantoprazole (PROTONIX) 40 MG tablet Take 1 tablet (40 mg total) by mouth once daily.    sertraline (ZOLOFT) 100 MG tablet Take 1 tablet (100 mg total) by mouth every evening.    traMADoL (ULTRAM) 50 mg tablet Take 50 mg by mouth every 6 (six) hours as needed.    traZODone (DESYREL) 100 MG tablet TAKE 2 TABLETS EVERY EVENING    albuterol (PROVENTIL) 2.5 mg /3 mL (0.083 %) nebulizer solution INHALE THE CONTENTS OF 1 VIAL VIA NEBULIZER EVERY 4 TO 6 HOURS AS NEEDED FOR WHEEZING OR FOR SHORTNESS OF BREATH    albuterol sulfate 90 mcg/actuation aebs Inhale 2 puffs into the lungs every 4 (four) hours as needed (WHEEZING OR SHORTNESS OF BREATH).     "amLODIPine (NORVASC) 2.5 MG tablet Take 1 tablet (2.5 mg total) by mouth once daily.    blood sugar diagnostic (TRUE METRIX GLUCOSE TEST STRIP) Strp Inject 1 each into the skin 4 (four) times daily.    blood-glucose meter Misc Use as instructed to test blood glucose meter daily.    brinzolamide-brimonidine (SIMBRINZA) 1-0.2 % DrpS Place 1 drop into both eyes 3 (three) times daily.    cyanocobalamin (VITAMIN B-12) 1000 MCG tablet Take 1 tablet by mouth once daily.    DEXCOM G7 SENSOR Shefali Change sensor every 10 days    EPINEPHrine (EPIPEN) 0.3 mg/0.3 mL AtIn Inject 0.3 mLs (0.3 mg total) into the muscle once. for 1 dose    INV allopurinol tablet Take 1 tablet by mouth once daily.    lancets (TRUEPLUS LANCETS) 33 gauge Misc Use to check blood sugar twice a day    lancets (TRUEPLUS LANCETS) 33 gauge Misc USE AS DIRECTED TO TEST BLOOD SUGAR FOUR TIMES DAILY    latanoprost 0.005 % ophthalmic solution Place 1 drop into both eyes every evening.    lisinopriL (PRINIVIL,ZESTRIL) 30 MG tablet TAKE 1 TABLET EVERY DAY. (Patient not taking: Reported on 11/12/2024)    lisinopriL 10 MG tablet Take 10 mg by mouth.    methocarbamoL (ROBAXIN) 750 MG Tab Take 750 mg by mouth 2 (two) times daily as needed.    multivitamin-iron-folic acid (SENTRY) Tab Take 1 tablet by mouth once daily.    omalizumab (XOLAIR) 150 mg/mL injection Inject 2 mLs (300 mg total) into the skin every 14 (fourteen) days.    omalizumab (XOLAIR) 75 mg/0.5 mL injection Inject 0.5 mLs (75 mg total) into the skin every 14 (fourteen) days.    pen needle, diabetic (BD ULTRA-FINE SHORT PEN NEEDLE) 31 gauge x 5/16" Ndle 1 each by Misc.(Non-Drug; Combo Route) route 3 (three) times daily.    pep injection Inject 0.15 ml as directed     For compounding pharmacy use:   Add PAPAVERINE 30 mg  Add PHENTOLAMINE 1 mg  Add ALPROSTADIL 20 mcg    pregabalin (LYRICA) 100 MG capsule Take 1 capsule (100 mg total) by mouth 3 (three) times daily.    ranolazine (RANEXA) 1,000 mg Tb12 TAKE " 1 TABLET TWICE DAILY    semaglutide (OZEMPIC) 2 mg/dose (8 mg/3 mL) PnIj Inject 2 mg into the skin every 7 days.    topiramate (TOPAMAX) 25 MG tablet Take 1 tablet (25 mg total) by mouth 2 (two) times daily.    VIOS AEROSOL DELIVERY SYSTEM Shefali USE AS DIRESTED    [DISCONTINUED] diclofenac sodium (VOLTAREN) 1 % Gel APPLY 2 GRAMS TOPICALLY FOUR TIMES DAILY (Patient not taking: Reported on 10/11/2024)     Family History       Problem Relation (Age of Onset)    Cancer Maternal Grandfather    Cataracts Mother, Father, Sister    Diabetes Mother, Sister, Maternal Aunt    Glaucoma Mother, Sister, Maternal Aunt    Hypertension Mother    No Known Problems Brother, Daughter, Son    Stroke Father, Sister          Tobacco Use    Smoking status: Never    Smokeless tobacco: Never   Substance and Sexual Activity    Alcohol use: Yes     Comment: rare, maybe holidays    Drug use: Not Currently     Types: Cocaine    Sexual activity: Not Currently     Partners: Female     Review of Systems   Constitutional: Positive for malaise/fatigue.   HENT: Negative.     Eyes: Negative.    Cardiovascular:  Positive for chest pain and dyspnea on exertion.   Respiratory:  Positive for cough, shortness of breath, sputum production and wheezing.    Endocrine: Negative.    Hematologic/Lymphatic: Negative.    Skin: Negative.    Musculoskeletal:  Positive for arthritis and joint pain.   Gastrointestinal: Negative.    Genitourinary: Negative.    Neurological: Negative.    Psychiatric/Behavioral: Negative.     Allergic/Immunologic: Negative.      Objective:     Vital Signs (Most Recent):  Temp: 97.8 °F (36.6 °C) (11/15/24 0737)  Pulse: 78 (11/15/24 0750)  Resp: 18 (11/15/24 0737)  BP: 130/65 (11/15/24 0737)  SpO2: (!) 92 % (11/15/24 0737) Vital Signs (24h Range):  Temp:  [97.4 °F (36.3 °C)-98.7 °F (37.1 °C)] 97.8 °F (36.6 °C)  Pulse:  [66-81] 78  Resp:  [16-22] 18  SpO2:  [89 %-98 %] 92 %  BP: (107-183)/(55-88) 130/65     Weight: 86.2 kg (190 lb)  Body mass  index is 31.62 kg/m².    SpO2: (!) 92 %         Intake/Output Summary (Last 24 hours) at 11/15/2024 1142  Last data filed at 11/15/2024 0438  Gross per 24 hour   Intake 700 ml   Output 4400 ml   Net -3700 ml       Lines/Drains/Airways       Peripheral Intravenous Line  Duration                  Peripheral IV - Single Lumen 11/13/24 20 G Anterior;Distal;Right Upper Arm 2 days                     Physical Exam  Vitals and nursing note reviewed.   Constitutional:       Appearance: Normal appearance.   HENT:      Head: Normocephalic and atraumatic.   Eyes:      Pupils: Pupils are equal, round, and reactive to light.   Cardiovascular:      Rate and Rhythm: Normal rate and regular rhythm.      Heart sounds: S1 normal and S2 normal. No murmur heard.  Pulmonary:      Comments: Diminished at bases, upper airway wheezing  Musculoskeletal:      Right lower leg: No edema.      Left lower leg: No edema.   Skin:     General: Skin is warm and dry.   Neurological:      General: No focal deficit present.      Mental Status: He is oriented to person, place, and time.   Psychiatric:         Mood and Affect: Mood normal.         Behavior: Behavior normal.          Significant Labs: CMP   Recent Labs   Lab 11/13/24  1854 11/14/24  0732 11/15/24  0652    142 139   K 4.1 4.1 5.1    112* 107   CO2 27 23 20*   GLU 98 105 118*   BUN 33* 31* 28*   CREATININE 2.1* 1.8* 1.7*   CALCIUM 8.7 7.8* 8.8   PROT 7.4 6.2 7.9   ALBUMIN 3.3* 2.8* 3.3*   BILITOT 0.2 0.2 0.4   ALKPHOS 94 75 87   AST 16 16 26   ALT 15 13 17   ANIONGAP 10 7* 12   , CBC   Recent Labs   Lab 11/13/24  1854 11/14/24  0733 11/15/24  0702   WBC 7.09 5.90 7.20   HGB 11.9* 10.7* 11.7*   HCT 36.9* 35.1* 38.2*    172 185   , Troponin   Recent Labs   Lab 11/14/24  1233 11/15/24  0815   TROPONINI 0.190* 0.164*   , and All pertinent lab results from the last 24 hours have been reviewed.    Significant Imaging: Echocardiogram: Transthoracic echo (TTE) complete ([facility]  Only):   Results for orders placed or performed during the hospital encounter of 11/13/24   Echo   Result Value Ref Range    BSA 1.99 m2    LVOT stroke volume 65.1 cm3    LVIDd 4.5 3.5 - 6.0 cm    LV Systolic Volume 42.67 mL    LV Systolic Volume Index 22.0 mL/m2    LVIDs 3.3 2.1 - 4.0 cm    LV Diastolic Volume 91.14 mL    LV Diastolic Volume Index 46.98 mL/m2    Left Ventricular End Systolic Volume by Teichholz Method 42.67 mL    Left Ventricular End Diastolic Volume by Teichholz Method 91.14 mL    IVS 1.6 (A) 0.6 - 1.1 cm    LVOT diameter 2.1 cm    LVOT area 3.5 cm2    FS 26.7 (A) 28 - 44 %    Left Ventricle Relative Wall Thickness 0.71 cm    PW 1.6 (A) 0.6 - 1.1 cm    LV mass 304.6 g    LV Mass Index 157.0 g/m2    MV Peak E Mathew 1.13 m/s    TDI LATERAL 0.07 m/s    TDI SEPTAL 0.07 m/s    E/E' ratio 16.14 m/s    MV Peak A Mathew 0.75 m/s    E/A ratio 1.51     IVRT 57.09 msec    E wave deceleration time 256.48 msec    LV SEPTAL E/E' RATIO 16.14 m/s    LV LATERAL E/E' RATIO 16.14 m/s    LVOT peak mathew 0.9 m/s    Left Ventricular Outflow Tract Mean Velocity 0.73 cm/s    Left Ventricular Outflow Tract Mean Gradient 2.14 mmHg    RVOT peak VTI 21.3 cm    TAPSE 1.99 cm    LA size 4.37 cm    Left Atrium Minor Axis 5.22 cm    Left Atrium Major Axis 5.25 cm    RA Major Axis 3.57 cm    AV mean gradient 5.9 mmHg    AV peak gradient 10.2 mmHg    Ao peak mathew 1.6 m/s    Ao VTI 33.9 cm    LVOT peak VTI 18.8 cm    AV valve area 1.9 cm²    AV Velocity Ratio 0.56     AV index (prosthetic) 0.55     PRAMOD by Velocity Ratio 1.9 cm²    MV mean gradient 2 mmHg    MV peak gradient 5 mmHg    MV stenosis pressure 1/2 time 74.38 ms    MV valve area p 1/2 method 2.96 cm2    MV valve area by continuity eq 1.97 cm2    MV VTI 33.0 cm    PV mean gradient 3 mmHg    RVOT peak mathew 1.07 m/s    Ao root annulus 3.46 cm    STJ 3.36 cm    Ascending aorta 3.28 cm    IVC diameter 1.77 cm    Mean e' 0.07 m/s    ZLVIDS -0.21     ZLVIDD -2.03     MAYDA 30.1 mL/m2     LA Vol 58.34 cm3    LA WIDTH 3.0 cm    RA Width 2.6 cm    Est. RA pres 3 mmHg    Narrative      Left Ventricle: The left ventricle is normal in size. There is moderate   concentric hypertrophy. There is low normal systolic function with a   visually estimated ejection fraction of 50 - 55%. There is normal   diastolic function.    Right Ventricle: Normal right ventricular cavity size. Systolic   function is normal.    Left Atrium: Left atrium is mildly dilated.    Aortic Valve: There is mild stenosis. Aortic valve area by VTI is 1.9   cm². Aortic valve peak velocity is 1.6 m/s. Mean gradient is 5.9 mmHg. The   dimensionless index is 0.55.    Mitral Valve: There is mild bileaflet sclerosis. There is mild   posterior mitral annular calcification present. There is mild   regurgitation.    IVC/SVC: Normal venous pressure at 3 mmHg.      and EKG: Reviewed  Assessment and Plan:   Patient who presents with JOSE MARIA. Episode of CP/SOB yesterday. Atypical, ? Bronchitis. Diurese prn. Troponin chronically elevated/flat. Imdur added to optimize regimen. Continue other OMT. Can f/u in clinic.    * JOSE MARIA (acute kidney injury)  -Per primary team    Type 2 diabetes mellitus  -Per primary team    Elevated troponin  -Troponin 0.190>0.164, chronically elevated due to demand ischemia  -CP more related to position/coughing--? Bronchitis---mgmt per primary team  -TTE with EF of 50-55%  -Continue OMT, Imdur added to maximize regimen      Hyperlipidemia associated with type 2 diabetes mellitus  -Statin    PAF (paroxysmal atrial fibrillation)  -Currently in SR  -Continue Eliquis    Chest pain  -See plan under elevated troponin    Chronic combined systolic and diastolic heart failure  -TTE this admission with EF of 50-55%, DD, mild AS  -Diurese prn  -Continue OMT    Nonobstructive coronary artery disease of native artery of native heart  -Continue OMT    Essential hypertension  -Continue home meds as tolerated  -Imdur added         VTE Risk  Mitigation (From admission, onward)           Ordered     apixaban tablet 5 mg  2 times daily         11/14/24 0140     IP VTE HIGH RISK PATIENT  Once         11/13/24 2038     Place sequential compression device  Until discontinued         11/13/24 2038                    Thank you for your consult. I will sign off. Please contact us if you have any additional questions.    Evelyn Bird PA-C  Cardiology   [facility]

## 2024-11-20 ENCOUNTER — OFFICE VISIT (OUTPATIENT)
Dept: INTERNAL MEDICINE | Facility: CLINIC | Age: 69
End: 2024-11-20
Payer: MEDICARE

## 2024-11-20 VITALS
OXYGEN SATURATION: 98 % | WEIGHT: 183.44 LBS | DIASTOLIC BLOOD PRESSURE: 64 MMHG | TEMPERATURE: 97 F | BODY MASS INDEX: 30.56 KG/M2 | HEART RATE: 70 BPM | SYSTOLIC BLOOD PRESSURE: 135 MMHG | HEIGHT: 65 IN

## 2024-11-20 DIAGNOSIS — G47.33 OSA (OBSTRUCTIVE SLEEP APNEA): ICD-10-CM

## 2024-11-20 DIAGNOSIS — J96.11 CHRONIC RESPIRATORY FAILURE WITH HYPOXIA, ON HOME O2 THERAPY: ICD-10-CM

## 2024-11-20 DIAGNOSIS — D68.69 OTHER THROMBOPHILIA: ICD-10-CM

## 2024-11-20 DIAGNOSIS — E11.22 TYPE 2 DIABETES MELLITUS WITH STAGE 3A CHRONIC KIDNEY DISEASE, WITH LONG-TERM CURRENT USE OF INSULIN: ICD-10-CM

## 2024-11-20 DIAGNOSIS — I50.42 CHRONIC COMBINED SYSTOLIC AND DIASTOLIC HEART FAILURE: ICD-10-CM

## 2024-11-20 DIAGNOSIS — Z79.4 TYPE 2 DIABETES MELLITUS WITH STAGE 3A CHRONIC KIDNEY DISEASE, WITH LONG-TERM CURRENT USE OF INSULIN: ICD-10-CM

## 2024-11-20 DIAGNOSIS — M51.26 HNP (HERNIATED NUCLEUS PULPOSUS), LUMBAR: ICD-10-CM

## 2024-11-20 DIAGNOSIS — M47.26 OSTEOARTHRITIS OF SPINE WITH RADICULOPATHY, LUMBAR REGION: ICD-10-CM

## 2024-11-20 DIAGNOSIS — E66.811 OBESITY (BMI 30.0-34.9): ICD-10-CM

## 2024-11-20 DIAGNOSIS — I10 ESSENTIAL HYPERTENSION: ICD-10-CM

## 2024-11-20 DIAGNOSIS — Z99.81 CHRONIC RESPIRATORY FAILURE WITH HYPOXIA, ON HOME O2 THERAPY: ICD-10-CM

## 2024-11-20 DIAGNOSIS — R26.81 GAIT INSTABILITY: ICD-10-CM

## 2024-11-20 DIAGNOSIS — Z74.09 OTHER REDUCED MOBILITY: ICD-10-CM

## 2024-11-20 DIAGNOSIS — N18.31 TYPE 2 DIABETES MELLITUS WITH STAGE 3A CHRONIC KIDNEY DISEASE, WITH LONG-TERM CURRENT USE OF INSULIN: ICD-10-CM

## 2024-11-20 DIAGNOSIS — N17.9 ACUTE KIDNEY INJURY: Primary | ICD-10-CM

## 2024-11-20 DIAGNOSIS — G89.29 OTHER CHRONIC PAIN: ICD-10-CM

## 2024-11-20 PROCEDURE — 99999 PR PBB SHADOW E&M-EST. PATIENT-LVL V: CPT | Mod: PBBFAC,,, | Performed by: PHYSICIAN ASSISTANT

## 2024-11-20 PROCEDURE — G2211 COMPLEX E/M VISIT ADD ON: HCPCS | Mod: S$GLB,,, | Performed by: PHYSICIAN ASSISTANT

## 2024-11-20 PROCEDURE — 3078F DIAST BP <80 MM HG: CPT | Mod: CPTII,S$GLB,, | Performed by: PHYSICIAN ASSISTANT

## 2024-11-20 PROCEDURE — 3044F HG A1C LEVEL LT 7.0%: CPT | Mod: CPTII,S$GLB,, | Performed by: PHYSICIAN ASSISTANT

## 2024-11-20 PROCEDURE — 3062F POS MACROALBUMINURIA REV: CPT | Mod: CPTII,S$GLB,, | Performed by: PHYSICIAN ASSISTANT

## 2024-11-20 PROCEDURE — 1159F MED LIST DOCD IN RCRD: CPT | Mod: CPTII,S$GLB,, | Performed by: PHYSICIAN ASSISTANT

## 2024-11-20 PROCEDURE — 99214 OFFICE O/P EST MOD 30 MIN: CPT | Mod: S$GLB,,, | Performed by: PHYSICIAN ASSISTANT

## 2024-11-20 PROCEDURE — 1101F PT FALLS ASSESS-DOCD LE1/YR: CPT | Mod: CPTII,S$GLB,, | Performed by: PHYSICIAN ASSISTANT

## 2024-11-20 PROCEDURE — 3075F SYST BP GE 130 - 139MM HG: CPT | Mod: CPTII,S$GLB,, | Performed by: PHYSICIAN ASSISTANT

## 2024-11-20 PROCEDURE — 1111F DSCHRG MED/CURRENT MED MERGE: CPT | Mod: CPTII,S$GLB,, | Performed by: PHYSICIAN ASSISTANT

## 2024-11-20 PROCEDURE — 3008F BODY MASS INDEX DOCD: CPT | Mod: CPTII,S$GLB,, | Performed by: PHYSICIAN ASSISTANT

## 2024-11-20 PROCEDURE — 1160F RVW MEDS BY RX/DR IN RCRD: CPT | Mod: CPTII,S$GLB,, | Performed by: PHYSICIAN ASSISTANT

## 2024-11-20 PROCEDURE — 4010F ACE/ARB THERAPY RXD/TAKEN: CPT | Mod: CPTII,S$GLB,, | Performed by: PHYSICIAN ASSISTANT

## 2024-11-20 PROCEDURE — 3066F NEPHROPATHY DOC TX: CPT | Mod: CPTII,S$GLB,, | Performed by: PHYSICIAN ASSISTANT

## 2024-11-20 PROCEDURE — 3288F FALL RISK ASSESSMENT DOCD: CPT | Mod: CPTII,S$GLB,, | Performed by: PHYSICIAN ASSISTANT

## 2024-11-20 NOTE — PROGRESS NOTES
Subjective:      Patient ID: Crow Green is a 69 y.o. male.    Chief Complaint: Follow-up    HPI  Here today for a hospital follow up for acute kidney injury noted on routine labs. Pt was asymptomatic at the time. Pt denies any recent heat exposure. Pt denies usage of any medications (ASA/NSAIDs) besides Tylenol. Patient denies any dysuria, hematuria, abd pain, CP, SOB, dizziness, and all other sxs.    ER workup revealed BUN/creatinine of 2.1/74 (previously 2.7/25 on 11/05/2024).  Baseline creatinine level 1.1.  Ultrasound of retroperitoneal negative for acute findings.  Hospital Medicine consulted to admit patient for JOSE MARIA.    History of Present Illness      PAIN MANAGEMENT:  He reports experiencing pain in his neck, shoulders, and hip, which may be related to weather changes, specifically colder temperatures. He notes no improvement in these symptoms.    HEADACHES:  Seeing neurology for management.     HYPERTENSION:  He reports previously taking amlodipine 2.5 mg for hypertension. He expresses confusion about his current blood pressure medication regimen and uncertainty about whether he should still be taking amlodipine. He acknowledges his blood pressure is currently elevated but demonstrates limited understanding of his prescribed antihypertensive therapy.    DIABETES MANAGEMENT:  He reports previously being on Ozempic, which has been discontinued. Also off insulin. He is currently taking Jardiance for diabetes management.      MOBILITY AND OXYGEN THERAPY:  He reports needing a rollator for mobility assistance and indicates his current wheelchair requires repair. He has portable oxygen therapy, a stationary unit in his room, and a portable one. He mentions issues with empty oxygen tanks but appears to have some confusion about his oxygen management and equipment.    Scheduled to see pulmonology in 2 days.       Also needs a new rollator. Seat is broken on his current rollator.     Medications were  reviewed        Patient Active Problem List   Diagnosis    ED (erectile dysfunction)    Non-proliferative diabetic retinopathy, mild, both eyes    Essential hypertension    Diabetic polyneuropathy    Nonobstructive coronary artery disease of native artery of native heart    Chronic combined systolic and diastolic heart failure    Type 2 diabetes mellitus with stage 3a chronic kidney disease, with long-term current use of insulin    SANDRITA (obstructive sleep apnea)    Allergic asthma    Psychophysiological insomnia    Nightmares    Sleep related gastroesophageal reflux disease    Inadequate sleep hygiene    Chest pain    PAF (paroxysmal atrial fibrillation)    At risk for medication noncompliance    Obesity (BMI 30.0-34.9)    Indeterminate stage chronic open angle glaucoma    Right hip pain    Gait instability    Chronic right shoulder pain    Arthritis    Left sided sciatica    Osteoarthritis of spine with radiculopathy, lumbar region    HNP (herniated nucleus pulposus), lumbar    Gastroesophageal reflux disease without esophagitis    Hypogonadism in male    Disorder of parathyroid gland    Hyperlipidemia associated with type 2 diabetes mellitus    Traumatic tear of left rotator cuff    Other chronic pain    Left shoulder pain    Complete tear of left rotator cuff    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 associated with type 2 diabetes mellitus    Lumbar radiculopathy    Diabetic ulcer of toe of left foot associated with type 2 diabetes mellitus, with necrosis of muscle    Ulcer of left foot    Elevated troponin    Hypotension due to medication    Dermatomyositis    Postprocedural hypotension    Advanced directives, counseling/discussion    Schizoaffective disorder, depressive type    MDD (major depressive disorder), recurrent episode, moderate    DOMINIK (generalized anxiety disorder)    Bilateral carpal tunnel syndrome    Rotator cuff tear arthropathy of left shoulder    Sacroiliac  dysfunction    Atherosclerosis of native coronary artery of native heart with unstable angina pectoris    Coronary vasospasm    Cocaine abuse    Osteopenia    Atherosclerosis of aorta    Cardiac asthma    Chronic gout of multiple sites    Acquired absence of left great toe    Hypoxia    Cardiomyopathy    Bilateral carotid artery stenosis    Substance abuse(UDS + for cocaine )    Immunocompromised patient    Other reduced mobility    Abnormality of gait and mobility    Dependence on supplemental oxygen    Positive depression screening    Pulmonary hypertension- echo 7/2022    NICM (nonischemic cardiomyopathy)    Elevated IgE level    Moderate persistent asthma without complication    Elevated coronary artery calcium score    Other chest pain    JOSE MARIA (acute kidney injury)    Type 2 diabetes mellitus    Chronic respiratory failure with hypoxia, on home O2 therapy    Other thrombophilia         Current Outpatient Medications:     albuterol (PROVENTIL) 2.5 mg /3 mL (0.083 %) nebulizer solution, INHALE THE CONTENTS OF 1 VIAL VIA NEBULIZER EVERY 4 TO 6 HOURS AS NEEDED FOR WHEEZING OR FOR SHORTNESS OF BREATH, Disp: 1080 mL, Rfl: 3    albuterol sulfate 90 mcg/actuation aebs, Inhale 2 puffs into the lungs every 4 (four) hours as needed (WHEEZING OR SHORTNESS OF BREATH)., Disp: , Rfl:     allopurinoL (ZYLOPRIM) 100 MG tablet, TAKE 1 TABLET ONE TIME DAILY, Disp: 90 tablet, Rfl: 3    atorvastatin (LIPITOR) 40 MG tablet, Take 1 tablet (40 mg total) by mouth every evening., Disp: 90 tablet, Rfl: 3    blood sugar diagnostic (TRUE METRIX GLUCOSE TEST STRIP) Strp, Inject 1 each into the skin 4 (four) times daily., Disp: 150 strip, Rfl: 1    blood-glucose meter Misc, Use as instructed to test blood glucose meter daily., Disp: 1 each, Rfl: 0    brinzolamide-brimonidine (SIMBRINZA) 1-0.2 % DrpS, Place 1 drop into both eyes 3 (three) times daily., Disp: 8 mL, Rfl: 6    cyanocobalamin (VITAMIN B-12) 1000 MCG tablet, Take 1 tablet by mouth  "once daily., Disp: , Rfl:     DEXCOM G7 SENSOR Shefali, Change sensor every 10 days, Disp: 3 each, Rfl: 11    ELIQUIS 5 mg Tab, TAKE 1 TABLET TWICE DAILY, Disp: 180 tablet, Rfl: 3    empagliflozin (JARDIANCE) 25 mg tablet, Take 1 tablet (25 mg total) by mouth once daily., Disp: 90 tablet, Rfl: 3    furosemide (LASIX) 40 MG tablet, Take 40 mg by mouth 2 (two) times daily. 2.5 tablets twice daily, Disp: , Rfl:     gabapentin (NEURONTIN) 800 MG tablet, Take 1 tablet (800 mg total) by mouth 3 (three) times daily., Disp: 270 tablet, Rfl: 4    INV allopurinol tablet, Take 1 tablet by mouth once daily., Disp: , Rfl:     isosorbide mononitrate (IMDUR) 30 MG 24 hr tablet, Take 1 tablet (30 mg total) by mouth once daily., Disp: 30 tablet, Rfl: 11    lancets (TRUEPLUS LANCETS) 33 gauge Misc, Use to check blood sugar twice a day, Disp: 100 each, Rfl: 6    lancets (TRUEPLUS LANCETS) 33 gauge Misc, USE AS DIRECTED TO TEST BLOOD SUGAR FOUR TIMES DAILY, Disp: 200 each, Rfl: 5    latanoprost 0.005 % ophthalmic solution, Place 1 drop into both eyes every evening., Disp: 7.5 mL, Rfl: 4    lisinopriL (PRINIVIL,ZESTRIL) 30 MG tablet, TAKE 1 TABLET EVERY DAY., Disp: 90 tablet, Rfl: 3    lisinopriL 10 MG tablet, Take 10 mg by mouth., Disp: , Rfl:     methocarbamoL (ROBAXIN) 750 MG Tab, Take 750 mg by mouth 2 (two) times daily as needed., Disp: , Rfl:     multivitamin-iron-folic acid (SENTRY) Tab, Take 1 tablet by mouth once daily., Disp: , Rfl:     omalizumab (XOLAIR) 150 mg/mL injection, Inject 2 mLs (300 mg total) into the skin every 14 (fourteen) days., Disp: 4 each, Rfl: 11    omalizumab (XOLAIR) 75 mg/0.5 mL injection, Inject 0.5 mLs (75 mg total) into the skin every 14 (fourteen) days., Disp: 2 each, Rfl: 11    pantoprazole (PROTONIX) 40 MG tablet, Take 1 tablet (40 mg total) by mouth once daily., Disp: 90 tablet, Rfl: 1    pen needle, diabetic (BD ULTRA-FINE SHORT PEN NEEDLE) 31 gauge x 5/16" Ndle, 1 each by Misc.(Non-Drug; Combo " Route) route 3 (three) times daily., Disp: 300 each, Rfl: 3    pep injection, Inject 0.15 ml as directed   For compounding pharmacy use:  Add PAPAVERINE 30 mg Add PHENTOLAMINE 1 mg Add ALPROSTADIL 20 mcg, Disp: 1 vial, Rfl: 5    ranolazine (RANEXA) 1,000 mg Tb12, TAKE 1 TABLET TWICE DAILY, Disp: 180 tablet, Rfl: 3    sertraline (ZOLOFT) 100 MG tablet, Take 1 tablet (100 mg total) by mouth every evening., Disp: 90 tablet, Rfl: 4    topiramate (TOPAMAX) 25 MG tablet, Take 1 tablet (25 mg total) by mouth 2 (two) times daily., Disp: 60 tablet, Rfl: 2    traMADoL (ULTRAM) 50 mg tablet, Take 50 mg by mouth every 6 (six) hours as needed., Disp: , Rfl:     traZODone (DESYREL) 100 MG tablet, TAKE 2 TABLETS EVERY EVENING, Disp: 180 tablet, Rfl: 3    VIOS AEROSOL DELIVERY SYSTEM Shefali, USE AS DIRESTED, Disp: , Rfl: 0    amLODIPine (NORVASC) 2.5 MG tablet, Take 1 tablet (2.5 mg total) by mouth once daily., Disp: 90 tablet, Rfl: 3    EPINEPHrine (EPIPEN) 0.3 mg/0.3 mL AtIn, Inject 0.3 mLs (0.3 mg total) into the muscle once. for 1 dose, Disp: 2 each, Rfl: 3    pregabalin (LYRICA) 100 MG capsule, Take 1 capsule (100 mg total) by mouth 3 (three) times daily., Disp: 90 capsule, Rfl: 0    Current Facility-Administered Medications:     omalizumab injection 300 mg, 300 mg, Subcutaneous, Q28 Days, , 300 mg at 08/13/24 1025    omalizumab injection 75 mg, 75 mg, Subcutaneous, Q28 Days, , 75 mg at 08/13/24 1026    Review of Systems   Constitutional:  Negative for activity change, appetite change, chills, diaphoresis, fatigue, fever and unexpected weight change.   HENT: Negative.  Negative for congestion, hearing loss, postnasal drip, rhinorrhea, sore throat, trouble swallowing and voice change.    Eyes: Negative.  Negative for visual disturbance.   Respiratory: Negative.  Negative for cough, choking, chest tightness and shortness of breath.    Cardiovascular:  Negative for chest pain, palpitations and leg swelling.   Gastrointestinal:   "Negative for abdominal distention, abdominal pain, blood in stool, constipation, diarrhea, nausea and vomiting.   Endocrine: Negative for cold intolerance, heat intolerance, polydipsia and polyuria.   Genitourinary: Negative.  Negative for difficulty urinating and frequency.   Musculoskeletal:  Positive for arthralgias, back pain, gait problem, myalgias, neck pain and neck stiffness. Negative for joint swelling.   Skin:  Negative for color change, pallor, rash and wound.   Neurological:  Negative for dizziness, tremors, weakness, light-headedness, numbness and headaches.   Hematological:  Negative for adenopathy.   Psychiatric/Behavioral:  Negative for behavioral problems, confusion, self-injury, sleep disturbance and suicidal ideas. The patient is not nervous/anxious.      Objective:   /64   Pulse 70   Temp 96.9 °F (36.1 °C) (Tympanic)   Ht 5' 5" (1.651 m)   Wt 83.2 kg (183 lb 6.8 oz)   SpO2 98%   BMI 30.52 kg/m²     Physical Exam  Vitals and nursing note reviewed.   Constitutional:       General: He is not in acute distress.     Appearance: Normal appearance. He is well-developed. He is not ill-appearing, toxic-appearing or diaphoretic.   HENT:      Head: Normocephalic and atraumatic.   Cardiovascular:      Rate and Rhythm: Normal rate and regular rhythm.      Heart sounds: Normal heart sounds. No murmur heard.     No friction rub. No gallop.   Pulmonary:      Effort: Pulmonary effort is normal. No respiratory distress.      Breath sounds: Normal breath sounds. No wheezing or rales.   Skin:     General: Skin is warm.      Findings: No rash.   Neurological:      Mental Status: He is alert and oriented to person, place, and time.   Psychiatric:         Mood and Affect: Mood normal.         Behavior: Behavior normal.         Thought Content: Thought content normal.         Judgment: Judgment normal.       Lab Results   Component Value Date    PTH 49.0 02/27/2018    CALCIUM 8.8 11/15/2024    CAION 1.18 " 02/27/2018    PHOS 2.6 (L) 11/15/2024     Lab Results   Component Value Date    WBC 7.20 11/15/2024    HGB 11.7 (L) 11/15/2024    HCT 38.2 (L) 11/15/2024    MCV 91 11/15/2024     11/15/2024         Assessment:     1. Acute kidney injury    2. Chronic respiratory failure with hypoxia, on home O2 therapy    3. Other thrombophilia    4. Other reduced mobility    5. Chronic combined systolic and diastolic heart failure    6. Essential hypertension    7. SANDRITA (obstructive sleep apnea)    8. Obesity (BMI 30.0-34.9)    9. Gait instability    10. Osteoarthritis of spine with radiculopathy, lumbar region    11. HNP (herniated nucleus pulposus), lumbar    12. Other chronic pain    13. Type 2 diabetes mellitus with stage 3a chronic kidney disease, with long-term current use of insulin      Plan:   Reviewed recent hospital stay due to kidney issues.   Assessed ongoing pain complaints in neck, shoulders, and hip.  Cont seeing pain management as scheduled.   Considered neurologist's recommendation to start Topamax  Assessed current diabetes management, including use of Jardiance and discontinuation of insulin and ozempic.  Reviewed recent labs that prompted ER visit  Noted recent breathing difficulties during hospital stay, resulting in pulmonologist consultation. Pt scheduled to see pulm in 2 days. Pt states that his breathing is currently stable.   Will recheck kidney function    Acute kidney injury  -     RENAL FUNCTION PANEL; Future; Expected date: 11/20/2024  - Kidney function test ordered to be completed before next appointment.  - Mr. Green to complete kidney function test on the day of the follow-up visit before seeing the provider.  -will recheck labs and determine if nephrology referral appropriate.     Chronic respiratory failure with hypoxia, on home O2 therapy  -     WALKER FOR HOME USE    Other thrombophilia  -stable on eliquis    Gait instability  Other reduced mobility  -     WALKER FOR HOME  USE    Chronic combined systolic and diastolic heart failure  -     WALKER FOR HOME USE    Essential hypertension  -stable today  -bring in meds to follow up    SANDRITA (obstructive sleep apnea)  -see pulm as scheduled    Obesity (BMI 30.0-34.9)  Advise to maintain lifestyle changes with low carbohydrate, low sugar diet and exercise 30 minutes daily\      Osteoarthritis of spine with radiculopathy, lumbar region  HNP (herniated nucleus pulposus), lumbar  Other chronic pain  -con seeing pain management      TYPE 2 DIABETES MELLITUS:  - Continued Jardiance.      FOLLOW-UP AND MEDICATION REVIEW:  - Follow up on the 3rd as scheduled.  - Mr. Green to bring all current medications to next appointment for review.  - Mr. Green to bring all current medications to the follow-up visit for review.         Follow up if symptoms worsen or fail to improve.

## 2024-11-22 ENCOUNTER — OFFICE VISIT (OUTPATIENT)
Dept: PULMONOLOGY | Facility: CLINIC | Age: 69
End: 2024-11-22
Payer: MEDICARE

## 2024-11-22 VITALS
BODY MASS INDEX: 30.93 KG/M2 | WEIGHT: 185.63 LBS | HEART RATE: 75 BPM | OXYGEN SATURATION: 96 % | HEIGHT: 65 IN | RESPIRATION RATE: 17 BRPM

## 2024-11-22 DIAGNOSIS — I48.0 PAROXYSMAL A-FIB: ICD-10-CM

## 2024-11-22 DIAGNOSIS — J98.4 RESTRICTIVE LUNG MECHANICS DUE TO NEUROMUSCULAR DISEASE: ICD-10-CM

## 2024-11-22 DIAGNOSIS — N18.31 STAGE 3A CHRONIC KIDNEY DISEASE: ICD-10-CM

## 2024-11-22 DIAGNOSIS — M33.13 DERMATOMYOSITIS: ICD-10-CM

## 2024-11-22 DIAGNOSIS — J45.909 EXTRINSIC ASTHMA WITHOUT COMPLICATION, UNSPECIFIED ASTHMA SEVERITY, UNSPECIFIED WHETHER PERSISTENT: ICD-10-CM

## 2024-11-22 DIAGNOSIS — G70.9 RESTRICTIVE LUNG MECHANICS DUE TO NEUROMUSCULAR DISEASE: ICD-10-CM

## 2024-11-22 DIAGNOSIS — G47.34 NOCTURNAL HYPOXEMIA: ICD-10-CM

## 2024-11-22 DIAGNOSIS — J45.909 EXTRINSIC ASTHMA, UNSPECIFIED ASTHMA SEVERITY, UNSPECIFIED WHETHER COMPLICATED, UNSPECIFIED WHETHER PERSISTENT: Primary | ICD-10-CM

## 2024-11-22 DIAGNOSIS — G47.33 OSA (OBSTRUCTIVE SLEEP APNEA): ICD-10-CM

## 2024-11-22 DIAGNOSIS — I50.32 CHRONIC HEART FAILURE WITH PRESERVED EJECTION FRACTION: ICD-10-CM

## 2024-11-22 PROCEDURE — 99999 PR PBB SHADOW E&M-EST. PATIENT-LVL V: CPT | Mod: PBBFAC,,, | Performed by: STUDENT IN AN ORGANIZED HEALTH CARE EDUCATION/TRAINING PROGRAM

## 2024-11-22 NOTE — PROGRESS NOTES
Chief complaint:  Chief Complaint   Patient presents with    Sleep Apnea      Active problems   Restrictive lung disease likely secondary to neuromuscular disease (dermatomyositis)  Allergic T2 high asthma with elevated IgE on Xolair therapy  CKD  Paroxysmal Afib  Heart failure with preserved ejection fraction  Hypertension  Nocturnal hypoxemia    History of present illness -  HPI   Crow is an established clinic patient is since 2021 for allergic asthma on biologic therapy with Xolair by Allergy, who has seen almost all the providers in the clinic lastly Dr. Vazquez.  He also has a history of dermatomyositis for which he has not seen Rheumatology in awhile  He was recently admitted to the hospital for JOSE MARIA common diagnostics were unremarkable therefore this is likely progression of his CKD      Interval history   11/22/2024  Patient is here mostly for hospital follow-up, feeling better, no complaints, stable from a respiratory standpoint.  Tells me that his CPAP machine is not working.    08/28/2024  Saw Dr. Vazquez for asthma continued inhaler use with Trelegy and was supposed to get repeat IgE..    05/15/2024  Came to clinic for nebulizer orders and medication refill    Physical examination -   Physical Exam  Vitals and nursing note reviewed.   Constitutional:       Appearance: Normal appearance.   HENT:      Head: Normocephalic and atraumatic.      Nose: Nose normal.      Mouth/Throat:      Mouth: Mucous membranes are moist.   Eyes:      Extraocular Movements: Extraocular movements intact.      Pupils: Pupils are equal, round, and reactive to light.   Cardiovascular:      Rate and Rhythm: Normal rate and regular rhythm.   Pulmonary:      Effort: Pulmonary effort is normal.      Comments: Diminished breath sounds bilaterally  Abdominal:      General: Abdomen is flat. Bowel sounds are normal.      Palpations: Abdomen is soft.   Musculoskeletal:         General: No swelling.   Skin:     General: Skin is warm.       "Capillary Refill: Capillary refill takes less than 2 seconds.   Neurological:      General: No focal deficit present.      Mental Status: He is alert.        Vitals:    11/22/24 0803   Pulse: 75   Resp: 17   SpO2: 96%   Weight: 84.2 kg (185 lb 10 oz)   Height: 5' 5" (1.651 m)      Review of Systems   Constitutional: Negative.    HENT: Negative.     Eyes: Negative.    Respiratory: Negative.     Cardiovascular: Negative.    Gastrointestinal: Negative.    Musculoskeletal: Negative.    Skin: Negative.    Neurological: Negative.        Relevant data (Independently reviewed by me) -    Prior notes -   Hospital notes, pulmonary, primary care    Labs -   Recent labs remarkable for creatinine elevation    Spirometry -  Spirometry from 01/25/2023 consistent with moderate restriction with a ratio of 80, FVC of 55% of predicted, FEV1 of 56% of predicted, vital capacity of 60% of predicted and a TLC of 68% of predicted, his DLCO is low but corrects for alveolar ventilation    Imaging -   CT chest performed in [facility] reads unremarkable lungs.  Although I do see evidence of mosaicism on a prior CT scan of the chest.    Assessment & Plan    Extrinsic asthma, unspecified asthma severity, unspecified whether complicated, unspecified whether persistent  -     Ambulatory referral/consult to Pulmonary Disease Management w/ Respiratory Therapist; Future; Expected date: 11/29/2024  -     Complete PFT with bronchodilator; Future; Expected date: 11/22/2024  -     MAXIMAL INSPIRATORY/EXPIRATORY PRESSURE; Future    SANDRITA (obstructive sleep apnea)  -     Ambulatory referral/consult to Sleep Disorders  -     CPAP/BIPAP SUPPLIES  -     CPAP FOR HOME USE    Dermatomyositis  -     CT Chest Without Contrast; Future; Expected date: 11/22/2024    Restrictive lung mechanics due to neuromuscular disease    Extrinsic asthma without complication, unspecified asthma severity, unspecified whether persistent    Paroxysmal A-fib    Stage 3a " chronic kidney disease    Chronic heart failure with preserved ejection fraction    Nocturnal hypoxemia      Patient's CPAP supplies were reordered.  He is to continue to take Trelegy as well as biologic therapy for his allergic asthma.      We will repeat his spirometry with maximal inspiratory and expiratory pressure to prove that his restrictive lung physiology is secondary to his dermatomyositis.  He does have somewhat elevated right hemidiaphragm and mosaicism pattern on the CT, we will repeat a CT with expiratory phase to prove this as well.  Somewhat asymptomatic from a pulmonary standpoint however I suspect unawareness of breathlessness due to chronic nature of the disease.    He needs to see Rheumatology to address his dermatomyositis he is scheduled to see them early next year  He will continue using his oxygen at night for his nocturnal hypoxemia, repeat an overnight oximetry study while on therapy to prove that is fully controlling his disease.  He will see pulmonary disease management for this    He needs to see nephrology for CKD     RTC in 3 months    Max Landin   11/22/2024

## 2024-11-29 ENCOUNTER — OFFICE VISIT (OUTPATIENT)
Dept: CARDIOLOGY | Facility: CLINIC | Age: 69
End: 2024-11-29
Payer: MEDICARE

## 2024-11-29 ENCOUNTER — EXTERNAL HOME HEALTH (OUTPATIENT)
Dept: HOME HEALTH SERVICES | Facility: HOSPITAL | Age: 69
End: 2024-11-29
Payer: MEDICARE

## 2024-11-29 VITALS
RESPIRATION RATE: 16 BRPM | DIASTOLIC BLOOD PRESSURE: 84 MMHG | HEART RATE: 83 BPM | BODY MASS INDEX: 30.19 KG/M2 | WEIGHT: 181.19 LBS | SYSTOLIC BLOOD PRESSURE: 139 MMHG | HEIGHT: 65 IN | OXYGEN SATURATION: 99 %

## 2024-11-29 DIAGNOSIS — I25.118 CORONARY ARTERY DISEASE OF NATIVE ARTERY OF NATIVE HEART WITH STABLE ANGINA PECTORIS: Chronic | ICD-10-CM

## 2024-11-29 DIAGNOSIS — I70.0 ATHEROSCLEROSIS OF AORTA: Primary | ICD-10-CM

## 2024-11-29 DIAGNOSIS — I20.1 CORONARY VASOSPASM: ICD-10-CM

## 2024-11-29 DIAGNOSIS — I27.20 PULMONARY HYPERTENSION: ICD-10-CM

## 2024-11-29 DIAGNOSIS — E11.69 HYPERLIPIDEMIA ASSOCIATED WITH TYPE 2 DIABETES MELLITUS: ICD-10-CM

## 2024-11-29 DIAGNOSIS — I42.8 NICM (NONISCHEMIC CARDIOMYOPATHY): ICD-10-CM

## 2024-11-29 DIAGNOSIS — I10 ESSENTIAL HYPERTENSION: ICD-10-CM

## 2024-11-29 DIAGNOSIS — I25.110 ATHEROSCLEROSIS OF NATIVE CORONARY ARTERY OF NATIVE HEART WITH UNSTABLE ANGINA PECTORIS: ICD-10-CM

## 2024-11-29 DIAGNOSIS — I42.8 OTHER CARDIOMYOPATHY: ICD-10-CM

## 2024-11-29 DIAGNOSIS — I65.23 BILATERAL CAROTID ARTERY STENOSIS: ICD-10-CM

## 2024-11-29 DIAGNOSIS — I50.42 CHRONIC COMBINED SYSTOLIC AND DIASTOLIC HEART FAILURE: ICD-10-CM

## 2024-11-29 DIAGNOSIS — E78.5 HYPERLIPIDEMIA ASSOCIATED WITH TYPE 2 DIABETES MELLITUS: ICD-10-CM

## 2024-11-29 PROCEDURE — 99999 PR PBB SHADOW E&M-EST. PATIENT-LVL V: CPT | Mod: PBBFAC,,, | Performed by: INTERNAL MEDICINE

## 2024-11-29 NOTE — PROGRESS NOTES
Subjective:   Patient ID:  Crow Green is a 69 y.o. male who presents for follow up of No chief complaint on file.      67 yo AAM, came in for 6 mf/u  PMH CAD nonobstructive and small vessel Dz per LHC done in 11/16, PAF s/p PVI in  by Dr. Brown, CHFpEF 45%, DM, hTN, HLD and obesity. Asthma and SANDRITA on CPAP f/u with Dr. Alicea.    -11/2016, Pt was admitted to [facility] for cough with little sputum for 6 weeks. Had severe left chest pain only with cough and sitting up from the bed. No pain with exercise. Denied palpitation, dizziness, orthopnea, PND and syncope. Troponin up to 0.218 and trending down. Echo showed EF 45% and MPI showed anteroseptal positive.   Subsequent LHC showed d2 30% lesion and otherwise demi Dz.   -03/2017, he was admitted to [facility] for acute cholelithiasis, s/p lap aidan. Pt had PAF during the admisssion and has been on amiodarone and Eliquis. BNP was up to 144 from 44 done five months ago.  -05/2018 at [facility], MPI showed SPECT images at rest and stress studies showed a moderate-sized, moderate anteroseptal perfusion defect and a large, severe inferior wall perfusion defect that are fixed. The gated stress study demonstrated a left ventricular ejection fraction of 35%, and ECHo showed EF 50%. Chest CTA showed no PE.  -2018, had urine drug presumptive cocaine positive but pt declined to use cocaine.   08/2019 afib with rvr during PFT, and admitted to [facility] due to weakness and dizziness.TROPONIN 0.07 AND FLAT. Converted to sinus after cardizem and BB. F/u with Dr. Brown on 08/15 and advised PVI if recurrent Afib.   echo showed EF 45%.  S/p Afib ablation in  by Dr. Brown  Lives with his dad     visit  05 and  twice admitted for chest pain. EKG no acute stt change. troponin 0.199 and flat.    ECHO EF 40%, MPI no ischemia  C/o cp 3 times a week. Lasted for seconds at rest. With SOB, dizziness.   Pt had quick chest pain for seconds when he was sitting  in the office today.     08/2021 visit  Admitted again in  for chest pain  Drug screen cocaine positive. 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 No headache in PM.   NO recurrent rx. BP low. Visiting nurse helped to prepare the medications for 2 weeks and pt is medication compliant     visit  On scooter for few months due to hip pain and legs weakness  Breathing rx per pulm service.  No leg swelling, chest pain. Dizziness in AM. Med compliance     visit  Left shoulder pain improved after shoulder procedure and rehab  imbalance after the toe removal.   No chest angina pain. No NTG SL prn since nov 2021  SOB controlled by inhaler and breathing rx  No leg swelling. No smoking med compliance  EKG NSR and old anterior infarct    07-22 visit  Progressively more frequent angina, needs twice taking NTG SL. Occurred at rest.  No dyspnea palpitation,   Some dizziness and headachce. LDL 88 and A1c 6.4   echo EF 45% and mod LVH    07/23 visit  Run out of lasix for two weeks. Taking Lisinopril and forgot why not on entresto  BNP was 97 in 05/23 and was at 200 to 300 levels in 2022.   Had headache and blurred vision. Some sob.  Soul and back pain     12/23 visit  Sent to ER after last visit.few ER visits at [facility] ER. Ct showed no aorta aneurysm in chest and abd. Cocaine +  05/23 echo showed EF 50% at [facility]   Remains intermittent chest pain. Stable SOB. No palpitation faint   F/u at pain clinic and now cocaine clean   in 07/23 04/24 visit  C/o lower left chest pain for 1 week, worse with deep breathing. +tenderness.  Some chest congestion. on home O2 at night.  Caroid US showed right 80 to 90% lesions. Not receive neck cta and vascular surgery consult    Interval history  Some fatigue and headache. Vision change and f/u at ophthalmology for glaucoma  Left chest pain. Blurred vision headache.  Chronic lower back pain    11/24 echo EF 50% mod LVh and AV calcification  EKG NSR IVCD                Past Medical History:   Diagnosis Date    Arthritis     Asthma     Atrial fibrillation     Atrial fibrillation with RVR 08/07/2019    Carotid artery stenosis     CHF (congestive heart failure)     Chronic obstructive pulmonary disease 08/05/2020    Depression     Dermatomyositis     Diabetes mellitus, type 2 Diagnosed in 2000    Elevated coronary artery calcium score 02/14/2024    Elevated PSA     Follow up 07/30/2020    Hypertension     Myocardial infarction     2017    Sleep apnea        Past Surgical History:   Procedure Laterality Date    ABLATION OF ARRHYTHMOGENIC FOCUS FOR ATRIAL FIBRILLATION N/A 2/27/2020    Procedure: Ablation atrial fibrillation;  Surgeon: Gio Brown MD;  Location: [facility] EP LAB;  Service: Cardiology;  Laterality: N/A;  afib, DAMIEN (cx if SR), PVI, PITO, anes, MB, 3 Prep    CHOLECYSTECTOMY      CLOSURE OF WOUND Left 7/1/2020    Procedure: CLOSURE, WOUND;  Surgeon: Barb Veras DPM;  Location: [facility] OR;  Service: Podiatry;  Laterality: Left;    COLONOSCOPY N/A 3/4/2022    Procedure: COLONOSCOPY;  Surgeon: Yolis Freitas MD;  Location: [facility] ENDO;  Service: Endoscopy;  Laterality: N/A;    ESOPHAGOGASTRODUODENOSCOPY N/A 3/4/2022    Procedure: EGD (ESOPHAGOGASTRODUODENOSCOPY);  Surgeon: Yolis Freitas MD;  Location: [facility] ENDO;  Service: Endoscopy;  Laterality: N/A;    INJECTION OF ANESTHETIC AGENT INTO SACROILIAC JOINT Left 3/24/2021    Procedure: Left BLOCK, SACROILIAC JOINT and bilateral rhomboid TPI;  Surgeon: Dillan Tsai MD;  Location: [facility] PAIN MGT;  Service: Pain Management;  Laterality: Left;    INTRALUMINAL GASTROINTESTINAL TRACT IMAGING VIA CAPSULE N/A 3/22/2022    Procedure: IMAGING PROCEDURE, GI TRACT, INTRALUMINAL, VIA CAPSULE;  Surgeon: Justice Martinez RN;  Location: [facility] ENDO;  Service: Endoscopy;  Laterality: N/A;    REVERSE TOTAL SHOULDER ARTHROPLASTY Left  1/10/2022    Procedure: ARTHROPLASTY, SHOULDER, TOTAL, REVERSE;  Surgeon: Anthony Alvarado MD;  Location: [facility] OR;  Service: Orthopedics;  Laterality: Left;  left reverse total shoulder arthroplasty     SELECTIVE INJECTION OF ANESTHETIC AGENT AROUND LUMBAR SPINAL NERVE ROOT BY TRANSFORAMINAL APPROACH Left 6/11/2020    Procedure: BLOCK, SPINAL NERVE ROOT, LUMBAR, SELECTIVE, TRANSFORAMINAL APPROACH Left L4-5, L5-S1 TFESI with RN IV sedation;  Surgeon: Dillan Tsai MD;  Location: [facility] PAIN MGT;  Service: Pain Management;  Laterality: Left;    TOE AMPUTATION Left 6/29/2020    Procedure: AMPUTATION, TOE;  Surgeon: Barb Veras DPM;  Location: [facility] OR;  Service: Podiatry;  Laterality: Left;  great toe       Social History     Tobacco Use    Smoking status: Never    Smokeless tobacco: Never   Substance Use Topics    Alcohol use: Yes     Comment: rare, maybe holidays    Drug use: Not Currently     Types: Cocaine       Family History   Problem Relation Name Age of Onset    Hypertension Mother      Glaucoma Mother      Cataracts Mother      Diabetes Mother      Cataracts Father      Stroke Father      Glaucoma Sister 4         x3    Cataracts Sister 4         x3    Diabetes Sister 4         x1    Stroke Sister 4         x1    No Known Problems Brother 2     No Known Problems Daughter 2     No Known Problems Son 3     Glaucoma Maternal Aunt      Diabetes Maternal Aunt      Cancer Maternal Grandfather          lung (smoker)    Prostate cancer Neg Hx      Heart disease Neg Hx      Kidney disease Neg Hx           ROS    Objective:   Physical Exam  HENT:      Head: Normocephalic.   Eyes:      Pupils: Pupils are equal, round, and reactive to light.   Neck:      Thyroid: No thyromegaly.      Vascular: Normal carotid pulses. No carotid bruit or JVD.   Cardiovascular:      Rate and Rhythm: Normal rate and regular rhythm. No extrasystoles are present.     Chest Wall: PMI is not displaced.      Pulses: Normal pulses.       Heart sounds: Normal heart sounds. No murmur heard.     No gallop. No S3 sounds.   Pulmonary:      Effort: No respiratory distress.      Breath sounds: Normal breath sounds. No stridor.   Abdominal:      General: Bowel sounds are normal.      Palpations: Abdomen is soft.      Tenderness: There is no abdominal tenderness. There is no rebound.   Musculoskeletal:      Comments:      Skin:     Findings: No rash.   Neurological:      Mental Status: He is alert and oriented to person, place, and time.   Psychiatric:         Behavior: Behavior normal.         Lab Results   Component Value Date    CHOL 144 11/08/2024    CHOL 117 07/08/2023    CHOL 103 05/23/2023     Lab Results   Component Value Date    HDL 39 (L) 11/08/2024    HDL 36 (L) 07/08/2023    HDL 41 05/23/2023     Lab Results   Component Value Date    LDLCALC 73.8 11/08/2024    LDLCALC 62 07/08/2023    LDLCALC 46 05/23/2023     Lab Results   Component Value Date    TRIG 156 (H) 11/08/2024    TRIG 97 07/08/2023    TRIG 80 05/23/2023     Lab Results   Component Value Date    CHOLHDL 27.1 11/08/2024    CHOLHDL 28.3 06/10/2022    CHOLHDL 26.7 05/25/2021       Chemistry        Component Value Date/Time     11/15/2024 0652    K 5.1 11/15/2024 0652     11/15/2024 0652    CO2 20 (L) 11/15/2024 0652    BUN 28 (H) 11/15/2024 0652    CREATININE 1.7 (H) 11/15/2024 0652     (H) 11/15/2024 0652        Component Value Date/Time    CALCIUM 8.8 11/15/2024 0652    ALKPHOS 87 11/15/2024 0652    AST 26 11/15/2024 0652    ALT 17 11/15/2024 0652    BILITOT 0.4 11/15/2024 0652    ESTGFRAFRICA 43 07/28/2023 0438    ESTGFRAFRICA >60 07/31/2022 0634    EGFRNONAA >60 07/31/2022 0634          Lab Results   Component Value Date    HGBA1C 5.7 (H) 11/08/2024     Lab Results   Component Value Date    TSH 0.846 11/08/2024     Lab Results   Component Value Date    INR 1.0 10/11/2024    INR 1.1 07/22/2024    INR 1.2 07/07/2023     Lab Results   Component Value Date    WBC 7.20  11/15/2024    HGB 11.7 (L) 11/15/2024    HCT 38.2 (L) 11/15/2024    MCV 91 11/15/2024     11/15/2024     BMP  Sodium   Date Value Ref Range Status   11/15/2024 139 136 - 145 mmol/L Final     Potassium   Date Value Ref Range Status   11/15/2024 5.1 3.5 - 5.1 mmol/L Final     Chloride   Date Value Ref Range Status   11/15/2024 107 95 - 110 mmol/L Final     CO2   Date Value Ref Range Status   11/15/2024 20 (L) 23 - 29 mmol/L Final     BUN   Date Value Ref Range Status   11/15/2024 28 (H) 8 - 23 mg/dL Final     Creatinine   Date Value Ref Range Status   11/15/2024 1.7 (H) 0.5 - 1.4 mg/dL Final     Calcium   Date Value Ref Range Status   11/15/2024 8.8 8.7 - 10.5 mg/dL Final     Anion Gap   Date Value Ref Range Status   11/15/2024 12 8 - 16 mmol/L Final     eGFR if    Date Value Ref Range Status   07/31/2022 >60 >60 mL/min/1.73 m^2 Final     eGFR    Date Value Ref Range Status   07/28/2023 43 mL/min/1.73mSq Final     Comment:     In accordance with NKF-ASN Task Force recommendation, calculation based on the Chronic Kidney Disease Epidemiology Collaboration (CKD-EPI) equation without adjustment for race. eGFR adjusted for gender and age and calculated in ml/min/1.73mSquared. eGFR cannot be calculated if patient is under 18 years of age.     Reference Range:   >= 60 ml/min/1.73mSquared.     eGFR if non    Date Value Ref Range Status   07/31/2022 >60 >60 mL/min/1.73 m^2 Final     Comment:     Calculation used to obtain the estimated glomerular filtration  rate (eGFR) is the CKD-EPI equation.        BNP  @LABRCNTIP(BNP,BNPTRIAGEBLO)@  @LABRCNTIP(troponini)@  CrCl cannot be calculated (Patient's most recent lab result is older than the maximum 7 days allowed.).  No results found in the last 24 hours.  No results found in the last 24 hours.  No results found in the last 24 hours.    Assessment:      1. Atherosclerosis of aorta    2. Atherosclerosis of native coronary  artery of native heart with unstable angina pectoris    3. Bilateral carotid artery stenosis    4. Other cardiomyopathy    5. Chronic combined systolic and diastolic heart failure    6. Coronary vasospasm    7. Essential hypertension    8. Hyperlipidemia associated with type 2 diabetes mellitus    9. NICM (nonischemic cardiomyopathy)    10. Nonobstructive coronary artery disease of native artery of native heart    11. Pulmonary hypertension- echo 7/2022        Plan:   Rtc in 1 yr    Continue lipitor amlodipine eliquis jardiance lasix lisinopril ranexa   DASH

## 2024-12-02 ENCOUNTER — INITIAL CONSULT (OUTPATIENT)
Dept: VASCULAR SURGERY | Facility: CLINIC | Age: 69
End: 2024-12-02
Payer: MEDICARE

## 2024-12-02 ENCOUNTER — HOSPITAL ENCOUNTER (OUTPATIENT)
Dept: VASCULAR SURGERY | Facility: HOSPITAL | Age: 69
Discharge: HOME | End: 2024-12-02
Payer: MEDICARE

## 2024-12-02 ENCOUNTER — TELEPHONE (OUTPATIENT)
Dept: INTERNAL MEDICINE | Facility: CLINIC | Age: 69
End: 2024-12-02
Payer: MEDICARE

## 2024-12-02 ENCOUNTER — OFFICE VISIT (OUTPATIENT)
Dept: OTOLARYNGOLOGY | Facility: CLINIC | Age: 69
End: 2024-12-02
Payer: MEDICARE

## 2024-12-02 VITALS — WEIGHT: 180.56 LBS | BODY MASS INDEX: 30.05 KG/M2

## 2024-12-02 DIAGNOSIS — E11.69 HYPERLIPIDEMIA ASSOCIATED WITH TYPE 2 DIABETES MELLITUS: ICD-10-CM

## 2024-12-02 DIAGNOSIS — I65.23 BILATERAL CAROTID ARTERY STENOSIS: ICD-10-CM

## 2024-12-02 DIAGNOSIS — H93.13 TINNITUS OF BOTH EARS: ICD-10-CM

## 2024-12-02 DIAGNOSIS — M26.629 TMJPDS (TEMPOROMANDIBULAR JOINT PAIN DYSFUNCTION SYNDROME): ICD-10-CM

## 2024-12-02 DIAGNOSIS — E78.5 HYPERLIPIDEMIA ASSOCIATED WITH TYPE 2 DIABETES MELLITUS: ICD-10-CM

## 2024-12-02 DIAGNOSIS — I65.23 BILATERAL CAROTID ARTERY STENOSIS: Primary | ICD-10-CM

## 2024-12-02 DIAGNOSIS — I48.0 PAF (PAROXYSMAL ATRIAL FIBRILLATION): Chronic | ICD-10-CM

## 2024-12-02 DIAGNOSIS — I10 ESSENTIAL HYPERTENSION: ICD-10-CM

## 2024-12-02 DIAGNOSIS — I65.21 CAROTID STENOSIS, RIGHT: ICD-10-CM

## 2024-12-02 LAB
LEFT ARM DIASTOLIC BLOOD PRESSURE: 64 MMHG
LEFT ARM SYSTOLIC BLOOD PRESSURE: 122 MMHG
LEFT CCA DIST DIAS: 53 CM/S
LEFT CCA DIST SYS: 227 CM/S
LEFT CCA MID DIAS: 26 CM/S
LEFT CCA MID SYS: 119 CM/S
LEFT CCA PROX DIAS: 23 CM/S
LEFT CCA PROX SYS: 94 CM/S
LEFT ECA DIAS: 9 CM/S
LEFT ECA SYS: 51 CM/S
LEFT ICA DIST DIAS: 25 CM/S
LEFT ICA DIST SYS: 70 CM/S
LEFT ICA MID DIAS: 30 CM/S
LEFT ICA MID SYS: 84 CM/S
LEFT ICA PROX DIAS: 39 CM/S
LEFT ICA PROX SYS: 154 CM/S
LEFT VERTEBRAL DIAS: 12 CM/S
LEFT VERTEBRAL SYS: 42 CM/S
OHS CV CAROTID RIGHT ICA EDV HIGHEST: 137
OHS CV CAROTID ULTRASOUND LEFT ICA/CCA RATIO: 0.68
OHS CV CAROTID ULTRASOUND RIGHT ICA/CCA RATIO: 5.46
OHS CV PV CAROTID LEFT HIGHEST CCA: 227
OHS CV PV CAROTID LEFT HIGHEST ICA: 154
OHS CV PV CAROTID RIGHT HIGHEST CCA: 81
OHS CV PV CAROTID RIGHT HIGHEST ICA: 377
OHS CV US CAROTID LEFT HIGHEST EDV: 39
RIGHT ARM DIASTOLIC BLOOD PRESSURE: 60 MMHG
RIGHT ARM SYSTOLIC BLOOD PRESSURE: 126 MMHG
RIGHT CCA DIST DIAS: 21 CM/S
RIGHT CCA DIST SYS: 69 CM/S
RIGHT CCA MID DIAS: 16 CM/S
RIGHT CCA MID SYS: 81 CM/S
RIGHT CCA PROX DIAS: 11 CM/S
RIGHT CCA PROX SYS: 68 CM/S
RIGHT ECA DIAS: 12 CM/S
RIGHT ECA SYS: 96 CM/S
RIGHT ICA DIST DIAS: 30 CM/S
RIGHT ICA DIST SYS: 91 CM/S
RIGHT ICA MID DIAS: 30 CM/S
RIGHT ICA MID SYS: 96 CM/S
RIGHT ICA PROX DIAS: 137 CM/S
RIGHT ICA PROX SYS: 377 CM/S
RIGHT VERTEBRAL DIAS: 16 CM/S
RIGHT VERTEBRAL SYS: 39 CM/S

## 2024-12-02 PROCEDURE — 1111F DSCHRG MED/CURRENT MED MERGE: CPT | Mod: CPTII,S$GLB,, | Performed by: STUDENT IN AN ORGANIZED HEALTH CARE EDUCATION/TRAINING PROGRAM

## 2024-12-02 PROCEDURE — 93880 EXTRACRANIAL BILAT STUDY: CPT

## 2024-12-02 PROCEDURE — 99205 OFFICE O/P NEW HI 60 MIN: CPT | Mod: S$GLB,,, | Performed by: STUDENT IN AN ORGANIZED HEALTH CARE EDUCATION/TRAINING PROGRAM

## 2024-12-02 PROCEDURE — 93880 EXTRACRANIAL BILAT STUDY: CPT | Mod: 26,,, | Performed by: STUDENT IN AN ORGANIZED HEALTH CARE EDUCATION/TRAINING PROGRAM

## 2024-12-02 PROCEDURE — 4010F ACE/ARB THERAPY RXD/TAKEN: CPT | Mod: CPTII,S$GLB,, | Performed by: OTOLARYNGOLOGY

## 2024-12-02 PROCEDURE — 99999 PR PBB SHADOW E&M-EST. PATIENT-LVL IV: CPT | Mod: PBBFAC,,, | Performed by: STUDENT IN AN ORGANIZED HEALTH CARE EDUCATION/TRAINING PROGRAM

## 2024-12-02 PROCEDURE — 3008F BODY MASS INDEX DOCD: CPT | Mod: CPTII,S$GLB,, | Performed by: OTOLARYNGOLOGY

## 2024-12-02 PROCEDURE — 3062F POS MACROALBUMINURIA REV: CPT | Mod: CPTII,S$GLB,, | Performed by: OTOLARYNGOLOGY

## 2024-12-02 PROCEDURE — 3044F HG A1C LEVEL LT 7.0%: CPT | Mod: CPTII,S$GLB,, | Performed by: OTOLARYNGOLOGY

## 2024-12-02 PROCEDURE — 3066F NEPHROPATHY DOC TX: CPT | Mod: CPTII,S$GLB,, | Performed by: OTOLARYNGOLOGY

## 2024-12-02 PROCEDURE — 1101F PT FALLS ASSESS-DOCD LE1/YR: CPT | Mod: CPTII,S$GLB,, | Performed by: OTOLARYNGOLOGY

## 2024-12-02 PROCEDURE — 1126F AMNT PAIN NOTED NONE PRSNT: CPT | Mod: CPTII,S$GLB,, | Performed by: OTOLARYNGOLOGY

## 2024-12-02 PROCEDURE — 3288F FALL RISK ASSESSMENT DOCD: CPT | Mod: CPTII,S$GLB,, | Performed by: OTOLARYNGOLOGY

## 2024-12-02 PROCEDURE — 1111F DSCHRG MED/CURRENT MED MERGE: CPT | Mod: CPTII,S$GLB,, | Performed by: OTOLARYNGOLOGY

## 2024-12-02 PROCEDURE — 99203 OFFICE O/P NEW LOW 30 MIN: CPT | Mod: S$GLB,,, | Performed by: OTOLARYNGOLOGY

## 2024-12-02 PROCEDURE — 99999 PR PBB SHADOW E&M-EST. PATIENT-LVL IV: CPT | Mod: PBBFAC,,, | Performed by: OTOLARYNGOLOGY

## 2024-12-02 NOTE — TELEPHONE ENCOUNTER
----- Message from Phyllis sent at 12/2/2024  9:43 AM CST -----  Contact: Mobile  [phone]  Patient said that the traMADoL (ULTRAM) 50 mg tablet that you have prescribe for him is not working.

## 2024-12-02 NOTE — PROGRESS NOTES
[facility]    OFFICE NOTE    DATE OF VISIT: 2024  PATIENT NAME: Crow Green  : 1955  MRN: [id number]  PRIMARY CARE PHYSICIAN: Cm Polanco MD  CARDIOLOGIST: eduarda  REFERRING PROVIDER: Rasheed Abreu NP    CHIEF COMPLAINT   Chief Complaint   Patient presents with    Consult     Patient in for carotid consult. Patient denies any symptoms at this time.       HISTORY OF PRESENT ILLNESS:  Crow Green is a 69 y.o. male who presents to clinic today for consultation regarding his carotid disease. He denies focal neurological deficits. Has critical stenosis of the right ica. Has afib on eliquis, home O2 at night.    ALLERGIES:  Review of patient's allergies indicates:   Allergen Reactions    Protamine Hives     Urticaria, possible upper airway swelling       PAST MEDICAL HISTORY:  Past Medical History:   Diagnosis Date    Arthritis     Asthma     Atrial fibrillation     Atrial fibrillation with RVR 2019    Carotid artery stenosis     CHF (congestive heart failure)     Chronic obstructive pulmonary disease 2020    Depression     Dermatomyositis     Diabetes mellitus, type 2 Diagnosed in     Elevated coronary artery calcium score 2024    Elevated PSA     Follow up 2020    Hypertension     Myocardial infarction     2017    Sleep apnea        PAST SURGICAL HISTORY:  Past Surgical History:   Procedure Laterality Date    ABLATION OF ARRHYTHMOGENIC FOCUS FOR ATRIAL FIBRILLATION N/A 2020    Procedure: Ablation atrial fibrillation;  Surgeon: Gio Brown MD;  Location: [facility] EP LAB;  Service: Cardiology;  Laterality: N/A;  afib, DAMIEN (cx if SR), PVI, PITO, anes, MB, 3 Prep    CHOLECYSTECTOMY      CLOSURE OF WOUND Left 2020    Procedure: CLOSURE, WOUND;  Surgeon: Barb Veras DPM;  Location: [facility] OR;  Service: Podiatry;  Laterality: Left;    COLONOSCOPY N/A 3/4/2022    Procedure: COLONOSCOPY;  Surgeon: Yolis Freitas MD;  Location: [facility] ENDO;   Service: Endoscopy;  Laterality: N/A;    ESOPHAGOGASTRODUODENOSCOPY N/A 3/4/2022    Procedure: EGD (ESOPHAGOGASTRODUODENOSCOPY);  Surgeon: Yolis Freitas MD;  Location: [facility] ENDO;  Service: Endoscopy;  Laterality: N/A;    INJECTION OF ANESTHETIC AGENT INTO SACROILIAC JOINT Left 3/24/2021    Procedure: Left BLOCK, SACROILIAC JOINT and bilateral rhomboid TPI;  Surgeon: Dillan Tsai MD;  Location: [facility] PAIN MGT;  Service: Pain Management;  Laterality: Left;    INTRALUMINAL GASTROINTESTINAL TRACT IMAGING VIA CAPSULE N/A 3/22/2022    Procedure: IMAGING PROCEDURE, GI TRACT, INTRALUMINAL, VIA CAPSULE;  Surgeon: Justice Martinez RN;  Location: [facility] ENDO;  Service: Endoscopy;  Laterality: N/A;    REVERSE TOTAL SHOULDER ARTHROPLASTY Left 1/10/2022    Procedure: ARTHROPLASTY, SHOULDER, TOTAL, REVERSE;  Surgeon: Anthony Alvarado MD;  Location: [facility] OR;  Service: Orthopedics;  Laterality: Left;  left reverse total shoulder arthroplasty     SELECTIVE INJECTION OF ANESTHETIC AGENT AROUND LUMBAR SPINAL NERVE ROOT BY TRANSFORAMINAL APPROACH Left 6/11/2020    Procedure: BLOCK, SPINAL NERVE ROOT, LUMBAR, SELECTIVE, TRANSFORAMINAL APPROACH Left L4-5, L5-S1 TFESI with RN IV sedation;  Surgeon: Dillan Tsai MD;  Location: [facility] PAIN MGT;  Service: Pain Management;  Laterality: Left;    TOE AMPUTATION Left 6/29/2020    Procedure: AMPUTATION, TOE;  Surgeon: Barb Veras DPM;  Location: [facility] OR;  Service: Podiatry;  Laterality: Left;  great toe       SOCIAL HISTORY:   Social History     Tobacco Use    Smoking status: Never    Smokeless tobacco: Never   Substance Use Topics    Alcohol use: Yes     Comment: rare, maybe holidays    Drug use: Not Currently     Types: Cocaine       FAMILY HISTORY:  Family History   Problem Relation Name Age of Onset    Hypertension Mother      Glaucoma Mother      Cataracts Mother      Diabetes Mother      Cataracts Father      Stroke Father      Glaucoma Sister 4         x3    Cataracts Sister 4          x3    Diabetes Sister 4         x1    Stroke Sister 4         x1    No Known Problems Brother 2     No Known Problems Daughter 2     No Known Problems Son 3     Glaucoma Maternal Aunt      Diabetes Maternal Aunt      Cancer Maternal Grandfather          lung (smoker)    Prostate cancer Neg Hx      Heart disease Neg Hx      Kidney disease Neg Hx         REVIEW OF SYSTEMS:  ROS  10 pt ros otherwise negative    PHYSICAL EXAM:  There were no vitals filed for this visit.   Physical Exam      Vss  Gen nad alert oriented  Cv rrr  Lung ctab  Affect Is flat  No focal neurological deficits    VASCULAR LAB STUDIES:  80-99% stenosis right ica  60-79% left ica stenosis  Mild to moderate vessel calcification right cca,  Distance between clavicle and bifurcation 6.8 cm, depth of cca near calvicle 24.4mm    ASSESSMENT AND PLAN:  PAF (paroxysmal atrial fibrillation)  He is on eliquis for this continue anticoagulation    Hyperlipidemia associated with type 2 diabetes mellitus  Continue medical therapy    Essential hypertension  Medical therapy    Bilateral carotid artery stenosis  Critical stenosis right ICA. I have recommended we proceed with right CEA with EEG monitoring. Will discuss with dr. Zhao if he needs any additional workup from cardiac standpoint bifurcation of ica is accessible located in the lower mid neck. He has CKD so we will forego getting cta head and neck . We discussed risk of cva, bleeding, infection.    Crow was seen today for consult.    Diagnoses and all orders for this visit:    Bilateral carotid artery stenosis    Carotid stenosis, right  -     Ambulatory referral/consult to Vascular Surgery    PAF (paroxysmal atrial fibrillation)    Hyperlipidemia associated with type 2 diabetes mellitus    Essential hypertension        No follow-ups on file.        [facility]  Vascular Surgery  [phone] (Clinic Number)

## 2024-12-02 NOTE — PROGRESS NOTES
Referring Provider:    Self, Aaareferral  No address on file  Subjective:   Patient: Crow Green [id number], :1955   Visit date:2024 12:55 PM    Chief Complaint:  Ear Problem (Pt is coming in today for ear pain right ear pain is causing  him to have headaches )    HPI:    Prior notes reviewed by myself.  Clinical documentation obtained by nursing staff reviewed.     69-year-old gentleman presents for evaluation of right greater than left ear pain.  He has not been able to identify any exacerbating or alleviating circumstances for this.  The pain fluctuates but it does not follow a particular pattern.  He takes Tylenol and OTC pain medication with partial relief.  He has not noticed any acute changes in his hearing or tinnitus.  He does have bilateral nonpulsatile tinnitus.        Objective:     Physical Exam:  Vitals:  Wt 81.9 kg (180 lb 8.9 oz)   BMI 30.05 kg/m²   General appearance:  Well developed, well nourished    Ears:  Otoscopy of external auditory canals and tympanic membranes was normal, clinical speech reception thresholds grossly intact, no mass/lesion of auricle.    Nose:  No masses/lesions of external nose, nasal mucosa, septum, and turbinates were within normal limits.    Mouth:  No mass/lesion of lips, mixed dentition - multiple dental caries, gums, hard/soft palate, tongue, tonsils, or oropharynx.    Neck & Lymphatics:  No cervical lymphadenopathy, no neck mass/crepitus/ asymmetry, trachea is midline, no thyroid enlargement/tenderness/mass. TTP over TMJ bilaterally        [x]  Data Reviewed:    Lab Results   Component Value Date    WBC 7.20 11/15/2024    HGB 11.7 (L) 11/15/2024    HCT 38.2 (L) 11/15/2024    MCV 91 11/15/2024    EOSINOPHIL 2.5 11/15/2024             Assessment & Plan:   Tinnitus of both ears  -     Ambulatory referral/consult to ENT    TMJPDS (temporomandibular joint pain dysfunction syndrome)  -     Ambulatory referral/consult to ENT      He has point tenderness  over both TMJs and his ear exam is normal.  We discussed conservative management strategies for this and I recommended a dental evaluation.  He understands that he can have an audiogram to evaluate his tinnitus whenever he is ready.      We had a long discussion regarding the underlying pathology of temporomandibular joint dysfunction (TMD) as the cause of ear pain.  We further discussed conservative measures to treat TMD including avoiding gum and other foods that require lots of chewing, warm compresses, and scheduled antinflammatories. We also discussed bruxism (grinding of the teeth) and the role that often plays in TMJ related pain.  I explained that for patients with evidence of wear consistent with bruxism, a mouth guard to be worn while sleeping is often necessary.  If the pain persists with conservative treatment, the patient will then schedule an appointment with a dentist for further evaluation.    Doni Meléndez M.D.  Department of Otolaryngology - Head & Neck Surgery  [address] [address]  P: [phone]  F: [phone]        DISCLAIMER: This note was prepared with [employer] voice recognition transcription software. Garbled syntax, mangled pronouns, and other bizarre constructions may be attributed to that software system. While efforts were made to correct any mistakes made by this voice recognition program, some errors and/or omissions may remain in the note that were missed when the note was originally created.

## 2024-12-02 NOTE — ASSESSMENT & PLAN NOTE
Critical stenosis right ICA. I have recommended we proceed with right CEA with EEG monitoring. Will discuss with dr. Zhao if he needs any additional workup from cardiac standpoint bifurcation of ica is accessible located in the lower mid neck. He has CKD so we will forego getting cta head and neck . We discussed risk of cva, bleeding, infection.

## 2024-12-03 ENCOUNTER — LAB VISIT (OUTPATIENT)
Dept: LAB | Facility: HOSPITAL | Age: 69
End: 2024-12-03
Attending: PHYSICIAN ASSISTANT
Payer: MEDICARE

## 2024-12-03 ENCOUNTER — OFFICE VISIT (OUTPATIENT)
Dept: INTERNAL MEDICINE | Facility: CLINIC | Age: 69
End: 2024-12-03
Payer: MEDICARE

## 2024-12-03 VITALS
RESPIRATION RATE: 18 BRPM | OXYGEN SATURATION: 98 % | BODY MASS INDEX: 30.08 KG/M2 | TEMPERATURE: 97 F | HEIGHT: 65 IN | SYSTOLIC BLOOD PRESSURE: 120 MMHG | WEIGHT: 180.56 LBS | HEART RATE: 66 BPM | DIASTOLIC BLOOD PRESSURE: 80 MMHG

## 2024-12-03 DIAGNOSIS — R26.81 GAIT INSTABILITY: ICD-10-CM

## 2024-12-03 DIAGNOSIS — G47.33 OSA (OBSTRUCTIVE SLEEP APNEA): ICD-10-CM

## 2024-12-03 DIAGNOSIS — E11.3293 MILD NONPROLIFERATIVE DIABETIC RETINOPATHY OF BOTH EYES ASSOCIATED WITH TYPE 2 DIABETES MELLITUS, MACULAR EDEMA PRESENCE UNSPECIFIED: ICD-10-CM

## 2024-12-03 DIAGNOSIS — E11.3312 MODERATE NONPROLIFERATIVE DIABETIC RETINOPATHY OF LEFT EYE WITH MACULAR EDEMA ASSOCIATED WITH TYPE 2 DIABETES MELLITUS: ICD-10-CM

## 2024-12-03 DIAGNOSIS — N17.9 AKI (ACUTE KIDNEY INJURY): ICD-10-CM

## 2024-12-03 DIAGNOSIS — J96.11 CHRONIC RESPIRATORY FAILURE WITH HYPOXIA, ON HOME O2 THERAPY: ICD-10-CM

## 2024-12-03 DIAGNOSIS — M54.16 LUMBAR RADICULOPATHY: ICD-10-CM

## 2024-12-03 DIAGNOSIS — N18.32 TYPE 2 DIABETES MELLITUS WITH STAGE 3B CHRONIC KIDNEY DISEASE, WITHOUT LONG-TERM CURRENT USE OF INSULIN: Primary | ICD-10-CM

## 2024-12-03 DIAGNOSIS — R97.20 INCREASED PROSTATE SPECIFIC ANTIGEN (PSA) VELOCITY: ICD-10-CM

## 2024-12-03 DIAGNOSIS — M1A.09X0 CHRONIC GOUT OF MULTIPLE SITES, UNSPECIFIED CAUSE: ICD-10-CM

## 2024-12-03 DIAGNOSIS — E11.22 TYPE 2 DIABETES MELLITUS WITH STAGE 3A CHRONIC KIDNEY DISEASE, WITH LONG-TERM CURRENT USE OF INSULIN: ICD-10-CM

## 2024-12-03 DIAGNOSIS — E11.42 DIABETIC POLYNEUROPATHY ASSOCIATED WITH TYPE 2 DIABETES MELLITUS: ICD-10-CM

## 2024-12-03 DIAGNOSIS — E11.59 HYPERTENSION ASSOCIATED WITH DIABETES: ICD-10-CM

## 2024-12-03 DIAGNOSIS — I25.118 CORONARY ARTERY DISEASE OF NATIVE ARTERY OF NATIVE HEART WITH STABLE ANGINA PECTORIS: Chronic | ICD-10-CM

## 2024-12-03 DIAGNOSIS — N18.31 TYPE 2 DIABETES MELLITUS WITH STAGE 3A CHRONIC KIDNEY DISEASE, WITH LONG-TERM CURRENT USE OF INSULIN: ICD-10-CM

## 2024-12-03 DIAGNOSIS — I50.42 CHRONIC COMBINED SYSTOLIC AND DIASTOLIC HEART FAILURE: ICD-10-CM

## 2024-12-03 DIAGNOSIS — N52.9 ERECTILE DYSFUNCTION, UNSPECIFIED ERECTILE DYSFUNCTION TYPE: ICD-10-CM

## 2024-12-03 DIAGNOSIS — M47.26 OSTEOARTHRITIS OF SPINE WITH RADICULOPATHY, LUMBAR REGION: ICD-10-CM

## 2024-12-03 DIAGNOSIS — E29.1 HYPOGONADISM IN MALE: ICD-10-CM

## 2024-12-03 DIAGNOSIS — I15.2 HYPERTENSION ASSOCIATED WITH DIABETES: ICD-10-CM

## 2024-12-03 DIAGNOSIS — Z99.81 CHRONIC RESPIRATORY FAILURE WITH HYPOXIA, ON HOME O2 THERAPY: ICD-10-CM

## 2024-12-03 DIAGNOSIS — E78.5 HYPERLIPIDEMIA ASSOCIATED WITH TYPE 2 DIABETES MELLITUS: ICD-10-CM

## 2024-12-03 DIAGNOSIS — I48.0 PAF (PAROXYSMAL ATRIAL FIBRILLATION): Chronic | ICD-10-CM

## 2024-12-03 DIAGNOSIS — N17.9 ACUTE KIDNEY INJURY: ICD-10-CM

## 2024-12-03 DIAGNOSIS — E11.22 TYPE 2 DIABETES MELLITUS WITH STAGE 3B CHRONIC KIDNEY DISEASE, WITHOUT LONG-TERM CURRENT USE OF INSULIN: Primary | ICD-10-CM

## 2024-12-03 DIAGNOSIS — E11.69 HYPERLIPIDEMIA ASSOCIATED WITH TYPE 2 DIABETES MELLITUS: ICD-10-CM

## 2024-12-03 DIAGNOSIS — H40.1194 INDETERMINATE STAGE CHRONIC OPEN ANGLE GLAUCOMA, UNSPECIFIED LATERALITY: ICD-10-CM

## 2024-12-03 DIAGNOSIS — F51.04 PSYCHOPHYSIOLOGICAL INSOMNIA: ICD-10-CM

## 2024-12-03 DIAGNOSIS — Z79.4 TYPE 2 DIABETES MELLITUS WITH STAGE 3A CHRONIC KIDNEY DISEASE, WITH LONG-TERM CURRENT USE OF INSULIN: ICD-10-CM

## 2024-12-03 DIAGNOSIS — Z89.412 ACQUIRED ABSENCE OF LEFT GREAT TOE: ICD-10-CM

## 2024-12-03 LAB
ALBUMIN SERPL BCP-MCNC: 3.4 G/DL (ref 3.5–5.2)
ANION GAP SERPL CALC-SCNC: 10 MMOL/L (ref 8–16)
BUN SERPL-MCNC: 43 MG/DL (ref 8–23)
CALCIUM SERPL-MCNC: 9.7 MG/DL (ref 8.7–10.5)
CHLORIDE SERPL-SCNC: 109 MMOL/L (ref 95–110)
CO2 SERPL-SCNC: 21 MMOL/L (ref 23–29)
CREAT SERPL-MCNC: 2.3 MG/DL (ref 0.5–1.4)
EST. GFR  (NO RACE VARIABLE): 30 ML/MIN/1.73 M^2
GLUCOSE SERPL-MCNC: 94 MG/DL (ref 70–110)
PHOSPHATE SERPL-MCNC: 3.2 MG/DL (ref 2.7–4.5)
POTASSIUM SERPL-SCNC: 4.3 MMOL/L (ref 3.5–5.1)
SODIUM SERPL-SCNC: 140 MMOL/L (ref 136–145)

## 2024-12-03 PROCEDURE — 80069 RENAL FUNCTION PANEL: CPT | Performed by: PHYSICIAN ASSISTANT

## 2024-12-03 PROCEDURE — 99999 PR PBB SHADOW E&M-EST. PATIENT-LVL V: CPT | Mod: PBBFAC,,, | Performed by: PHYSICIAN ASSISTANT

## 2024-12-03 PROCEDURE — 36415 COLL VENOUS BLD VENIPUNCTURE: CPT | Performed by: PHYSICIAN ASSISTANT

## 2024-12-03 RX ORDER — KETOROLAC TROMETHAMINE 5 MG/ML
1 SOLUTION OPHTHALMIC 4 TIMES DAILY
Qty: 5 ML | Refills: 0 | Status: SHIPPED | OUTPATIENT
Start: 2024-12-03 | End: 2024-12-08

## 2024-12-03 RX ORDER — AMLODIPINE BESYLATE 2.5 MG/1
2.5 TABLET ORAL DAILY
Qty: 90 TABLET | Refills: 3 | Status: SHIPPED | OUTPATIENT
Start: 2024-12-03 | End: 2025-12-03

## 2024-12-03 NOTE — PROGRESS NOTES
Subjective:      Patient ID: Crow Green is a 69 y.o. male.    Chief Complaint: Follow-up (Pt states pain in left hip pain is a 10 out of 10)    HPI      History of Present Illness    CHIEF COMPLAINT:  Mr. Green presents today for medication review and follow-up on kidney function.  Scheduled to see pain management in 2 days.   Scheduled to see nephrology next month.   Up to date with vascular, cardiology, and pulmonology.   Elevated psa velocity on recent labs. He has seen urology but missed his follow up. Will make sure he gets scheduled.     Lab and follow up scheduled for May already.       KIDNEY FUNCTION:  He is scheduled to see a nephrologist due to suboptimal kidney function as indicated by recent lab results.    MUSCULOSKELETAL:  He reports experiencing severe pain in the left hip. He is currently taking Methocarbamol 750 mg as a muscle relaxer.    CARDIOVASCULAR:  He has been taking Lisinopril at both 10 mg and 30 mg doses, but is now instructed to discontinue the 10 mg dose and continue with the 30 mg dose only. He requires a refill of Amlodipine.      UROLOGY:  His PSA levels have increased faster than usual, indicating an elevated PSA velocity. He missed a follow-up visit with Dr. Dickson.    Medications were reviewed        Patient Active Problem List   Diagnosis    ED (erectile dysfunction)    Non-proliferative diabetic retinopathy, mild, both eyes    Essential hypertension    Diabetic polyneuropathy    Nonobstructive coronary artery disease of native artery of native heart    Chronic combined systolic and diastolic heart failure    Type 2 diabetes mellitus with stage 3a chronic kidney disease, with long-term current use of insulin    SANDRITA (obstructive sleep apnea)    Allergic asthma    Psychophysiological insomnia    Nightmares    Sleep related gastroesophageal reflux disease    Inadequate sleep hygiene    Chest pain    PAF (paroxysmal atrial fibrillation)    At risk for medication  noncompliance    Obesity (BMI 30.0-34.9)    Indeterminate stage chronic open angle glaucoma    Right hip pain    Gait instability    Chronic right shoulder pain    Arthritis    Left sided sciatica    Osteoarthritis of spine with radiculopathy, lumbar region    HNP (herniated nucleus pulposus), lumbar    Gastroesophageal reflux disease without esophagitis    Hypogonadism in male    Disorder of parathyroid gland    Hyperlipidemia associated with type 2 diabetes mellitus    Traumatic tear of left rotator cuff    Other chronic pain    Left shoulder pain    Complete tear of left rotator cuff    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 associated with type 2 diabetes mellitus    Lumbar radiculopathy    Diabetic ulcer of toe of left foot associated with type 2 diabetes mellitus, with necrosis of muscle    Ulcer of left foot    Elevated troponin    Hypotension due to medication    Dermatomyositis    Postprocedural hypotension    Advanced directives, counseling/discussion    Schizoaffective disorder, depressive type    MDD (major depressive disorder), recurrent episode, moderate    DOMINIK (generalized anxiety disorder)    Bilateral carpal tunnel syndrome    Rotator cuff tear arthropathy of left shoulder    Sacroiliac dysfunction    Atherosclerosis of native coronary artery of native heart with unstable angina pectoris    Coronary vasospasm    Cocaine abuse    Osteopenia    Atherosclerosis of aorta    Cardiac asthma    Chronic gout of multiple sites    Acquired absence of left great toe    Hypoxia    Cardiomyopathy    Bilateral carotid artery stenosis    Substance abuse(UDS + for cocaine )    Immunocompromised patient    Other reduced mobility    Abnormality of gait and mobility    Dependence on supplemental oxygen    Positive depression screening    Pulmonary hypertension- echo 7/2022    NICM (nonischemic cardiomyopathy)    Elevated IgE level    Moderate persistent asthma without  complication    Elevated coronary artery calcium score    Other chest pain    JOSE MARIA (acute kidney injury)    Type 2 diabetes mellitus    Chronic respiratory failure with hypoxia, on home O2 therapy    Other thrombophilia         Current Outpatient Medications:     albuterol (PROVENTIL) 2.5 mg /3 mL (0.083 %) nebulizer solution, INHALE THE CONTENTS OF 1 VIAL VIA NEBULIZER EVERY 4 TO 6 HOURS AS NEEDED FOR WHEEZING OR FOR SHORTNESS OF BREATH, Disp: 1080 mL, Rfl: 3    albuterol sulfate 90 mcg/actuation aebs, Inhale 2 puffs into the lungs every 4 (four) hours as needed (WHEEZING OR SHORTNESS OF BREATH)., Disp: , Rfl:     allopurinoL (ZYLOPRIM) 100 MG tablet, TAKE 1 TABLET ONE TIME DAILY, Disp: 90 tablet, Rfl: 3    atorvastatin (LIPITOR) 40 MG tablet, Take 1 tablet (40 mg total) by mouth every evening., Disp: 90 tablet, Rfl: 3    blood sugar diagnostic (TRUE METRIX GLUCOSE TEST STRIP) Strp, Inject 1 each into the skin 4 (four) times daily., Disp: 150 strip, Rfl: 1    blood-glucose meter Misc, Use as instructed to test blood glucose meter daily., Disp: 1 each, Rfl: 0    brinzolamide-brimonidine (SIMBRINZA) 1-0.2 % DrpS, Place 1 drop into both eyes 3 (three) times daily., Disp: 8 mL, Rfl: 6    cyanocobalamin (VITAMIN B-12) 1000 MCG tablet, Take 1 tablet by mouth once daily., Disp: , Rfl:     DEXCOM G7 SENSOR Shefali, Change sensor every 10 days, Disp: 3 each, Rfl: 11    ELIQUIS 5 mg Tab, TAKE 1 TABLET TWICE DAILY, Disp: 180 tablet, Rfl: 3    empagliflozin (JARDIANCE) 25 mg tablet, Take 1 tablet (25 mg total) by mouth once daily., Disp: 90 tablet, Rfl: 3    EPINEPHrine (EPIPEN) 0.3 mg/0.3 mL AtIn, Inject 0.3 mLs (0.3 mg total) into the muscle once. for 1 dose, Disp: 2 each, Rfl: 3    furosemide (LASIX) 40 MG tablet, Take 40 mg by mouth 2 (two) times daily. 2.5 tablets twice daily, Disp: , Rfl:     gabapentin (NEURONTIN) 800 MG tablet, Take 1 tablet (800 mg total) by mouth 3 (three) times daily., Disp: 270 tablet, Rfl: 4    INV  "allopurinol tablet, Take 1 tablet by mouth once daily., Disp: , Rfl:     isosorbide mononitrate (IMDUR) 30 MG 24 hr tablet, Take 1 tablet (30 mg total) by mouth once daily., Disp: 30 tablet, Rfl: 11    lancets (TRUEPLUS LANCETS) 33 gauge Misc, Use to check blood sugar twice a day, Disp: 100 each, Rfl: 6    lancets (TRUEPLUS LANCETS) 33 gauge Misc, USE AS DIRECTED TO TEST BLOOD SUGAR FOUR TIMES DAILY, Disp: 200 each, Rfl: 5    latanoprost 0.005 % ophthalmic solution, Place 1 drop into both eyes every evening., Disp: 7.5 mL, Rfl: 4    lisinopriL (PRINIVIL,ZESTRIL) 30 MG tablet, TAKE 1 TABLET EVERY DAY., Disp: 90 tablet, Rfl: 3    methocarbamoL (ROBAXIN) 750 MG Tab, Take 750 mg by mouth 2 (two) times daily as needed., Disp: , Rfl:     multivitamin-iron-folic acid (SENTRY) Tab, Take 1 tablet by mouth once daily., Disp: , Rfl:     omalizumab (XOLAIR) 150 mg/mL injection, Inject 2 mLs (300 mg total) into the skin every 14 (fourteen) days., Disp: 4 each, Rfl: 11    omalizumab (XOLAIR) 75 mg/0.5 mL injection, Inject 0.5 mLs (75 mg total) into the skin every 14 (fourteen) days., Disp: 2 each, Rfl: 11    pantoprazole (PROTONIX) 40 MG tablet, Take 1 tablet (40 mg total) by mouth once daily., Disp: 90 tablet, Rfl: 1    pen needle, diabetic (BD ULTRA-FINE SHORT PEN NEEDLE) 31 gauge x 5/16" Ndle, 1 each by Misc.(Non-Drug; Combo Route) route 3 (three) times daily., Disp: 300 each, Rfl: 3    pep injection, Inject 0.15 ml as directed   For compounding pharmacy use:  Add PAPAVERINE 30 mg Add PHENTOLAMINE 1 mg Add ALPROSTADIL 20 mcg, Disp: 1 vial, Rfl: 5    ranolazine (RANEXA) 1,000 mg Tb12, TAKE 1 TABLET TWICE DAILY, Disp: 180 tablet, Rfl: 3    sertraline (ZOLOFT) 100 MG tablet, Take 1 tablet (100 mg total) by mouth every evening., Disp: 90 tablet, Rfl: 4    topiramate (TOPAMAX) 25 MG tablet, Take 1 tablet (25 mg total) by mouth 2 (two) times daily., Disp: 60 tablet, Rfl: 2    traMADoL (ULTRAM) 50 mg tablet, Take 50 mg by mouth every " 6 (six) hours as needed., Disp: , Rfl:     traZODone (DESYREL) 100 MG tablet, TAKE 2 TABLETS EVERY EVENING, Disp: 180 tablet, Rfl: 3    VIOS AEROSOL DELIVERY SYSTEM Shefali, USE AS DIRESTED, Disp: , Rfl: 0    amLODIPine (NORVASC) 2.5 MG tablet, Take 1 tablet (2.5 mg total) by mouth once daily., Disp: 90 tablet, Rfl: 3    Current Facility-Administered Medications:     omalizumab injection 300 mg, 300 mg, Subcutaneous, Q28 Days, , 300 mg at 08/13/24 1025    omalizumab injection 75 mg, 75 mg, Subcutaneous, Q28 Days, , 75 mg at 08/13/24 1026    Review of Systems   Constitutional:  Negative for activity change, appetite change, chills, diaphoresis, fatigue, fever and unexpected weight change.   HENT: Negative.  Negative for congestion, hearing loss, postnasal drip, rhinorrhea, sore throat, trouble swallowing and voice change.    Eyes: Negative.  Negative for visual disturbance.   Respiratory: Negative.  Negative for cough, choking, chest tightness and shortness of breath.    Cardiovascular:  Negative for chest pain, palpitations and leg swelling.   Gastrointestinal:  Negative for abdominal distention, abdominal pain, blood in stool, constipation, diarrhea, nausea and vomiting.   Endocrine: Negative for cold intolerance, heat intolerance, polydipsia and polyuria.   Genitourinary: Negative.  Negative for difficulty urinating and frequency.   Musculoskeletal:  Positive for arthralgias, back pain, gait problem, myalgias, neck pain and neck stiffness. Negative for joint swelling.   Skin:  Negative for color change, pallor, rash and wound.   Neurological:  Negative for dizziness, tremors, weakness, light-headedness, numbness and headaches.   Hematological:  Negative for adenopathy.   Psychiatric/Behavioral:  Negative for behavioral problems, confusion, self-injury, sleep disturbance and suicidal ideas. The patient is not nervous/anxious.      Objective:   /80 (BP Location: Left arm, Patient Position: Sitting)   Pulse 66    "Temp 96.5 °F (35.8 °C) (Tympanic)   Resp 18   Ht 5' 5" (1.651 m)   Wt 81.9 kg (180 lb 8.9 oz)   SpO2 98%   BMI 30.05 kg/m²     Physical Exam  Vitals and nursing note reviewed.   Constitutional:       General: He is not in acute distress.     Appearance: Normal appearance. He is well-developed. He is not ill-appearing, toxic-appearing or diaphoretic.   HENT:      Head: Normocephalic and atraumatic.   Cardiovascular:      Rate and Rhythm: Normal rate and regular rhythm.      Heart sounds: Normal heart sounds. No murmur heard.     No friction rub. No gallop.   Pulmonary:      Effort: Pulmonary effort is normal. No respiratory distress.      Breath sounds: Normal breath sounds. No wheezing or rales.   Skin:     General: Skin is warm.      Findings: No rash.   Neurological:      Mental Status: He is alert and oriented to person, place, and time.   Psychiatric:         Mood and Affect: Mood normal.         Behavior: Behavior normal.         Thought Content: Thought content normal.         Judgment: Judgment normal.       Lab Visit on 12/03/2024   Component Date Value Ref Range Status    Glucose 12/03/2024 94  70 - 110 mg/dL Final    Sodium 12/03/2024 140  136 - 145 mmol/L Final    Potassium 12/03/2024 4.3  3.5 - 5.1 mmol/L Final    Chloride 12/03/2024 109  95 - 110 mmol/L Final    CO2 12/03/2024 21 (L)  23 - 29 mmol/L Final    BUN 12/03/2024 43 (H)  8 - 23 mg/dL Final    Calcium 12/03/2024 9.7  8.7 - 10.5 mg/dL Final    Creatinine 12/03/2024 2.3 (H)  0.5 - 1.4 mg/dL Final    Albumin 12/03/2024 3.4 (L)  3.5 - 5.2 g/dL Final    Phosphorus 12/03/2024 3.2  2.7 - 4.5 mg/dL Final    eGFR 12/03/2024 30 (A)  >60 mL/min/1.73 m^2 Final    Anion Gap 12/03/2024 10  8 - 16 mmol/L Final   Hospital Outpatient Visit on 12/02/2024   Component Date Value Ref Range Status    Right CCA prox sys 12/02/2024 68  cm/s Final    Right CCA prox romo 12/02/2024 11  cm/s Final    Right CCA mid sys 12/02/2024 81  cm/s Final    Right CCA mid romo " 12/02/2024 16  cm/s Final    Right CCA dist sys 12/02/2024 69  cm/s Final    Right CCA dist romo 12/02/2024 21  cm/s Final    Right ICA prox sys 12/02/2024 377  cm/s Final    Right ICA prox romo 12/02/2024 137  cm/s Final    Right ICA mid sys 12/02/2024 96  cm/s Final    Right ICA mid romo 12/02/2024 30  cm/s Final    Right ICA dist sys 12/02/2024 91  cm/s Final    Right ICA dist romo 12/02/2024 30  cm/s Final    Right ECA sys 12/02/2024 96  cm/s Final    Right ECA romo 12/02/2024 12  cm/s Final    Right vertebral sys 12/02/2024 39  cm/s Final    Right vertebral romo 12/02/2024 16  cm/s Final    Right ICA/CCA ratio 12/02/2024 5.46   Final    Right Highest ICA 12/02/2024 377.00   Final    Right Highest EDV 12/02/2024 137.00   Final    Right Highest CCA 12/02/2024 81   Final    Left CCA prox sys 12/02/2024 94  cm/s Final    Left CCA prox romo 12/02/2024 23  cm/s Final    Left CCA mid sys 12/02/2024 119  cm/s Final    Left CCA mid romo 12/02/2024 26  cm/s Final    Left CCA dist sys 12/02/2024 227  cm/s Final    Left CCA dist romo 12/02/2024 53  cm/s Final    Left ICA prox sys 12/02/2024 154  cm/s Final    Left ICA prox romo 12/02/2024 39  cm/s Final    Left ICA mid sys 12/02/2024 84  cm/s Final    Left ICA mid romo 12/02/2024 30  cm/s Final    Left ICA dist sys 12/02/2024 70  cm/s Final    Left ICA dist romo 12/02/2024 25  cm/s Final    Left ECA sys 12/02/2024 51  cm/s Final    Left ECA romo 12/02/2024 9  cm/s Final    Left vertebral sys 12/02/2024 42  cm/s Final    Left vertebral romo 12/02/2024 12  cm/s Final    Left ICA/CCA ratio 12/02/2024 0.68   Final    Left Highest ICA 12/02/2024 154.00   Final    LT Highest EDV 12/02/2024 39.00   Final    Left Highest CCA 12/02/2024 227   Final    Right arm systolic blood pressure 12/02/2024 126.00  mmHg Final    Right arm diastolic blood pressure 12/02/2024 60.00  mmHg Final    Left arm systolic blood pressure 12/02/2024 122.00  mmHg Final    Left arm diastolic blood pressure  12/02/2024 64.00  mmHg Final   No results displayed because visit has over 200 results.      Lab Visit on 11/12/2024   Component Date Value Ref Range Status    Sodium 11/12/2024 138  136 - 145 mmol/L Final    Potassium 11/12/2024 3.9  3.5 - 5.1 mmol/L Final    Chloride 11/12/2024 101  95 - 110 mmol/L Final    CO2 11/12/2024 28  23 - 29 mmol/L Final    Glucose 11/12/2024 119 (H)  70 - 110 mg/dL Final    BUN 11/12/2024 38 (H)  8 - 23 mg/dL Final    Creatinine 11/12/2024 2.7 (H)  0.5 - 1.4 mg/dL Final    Calcium 11/12/2024 8.6 (L)  8.7 - 10.5 mg/dL Final    Anion Gap 11/12/2024 9  8 - 16 mmol/L Final    eGFR 11/12/2024 25.0 (A)  >60 mL/min/1.73 m^2 Final   Lab Visit on 11/08/2024   Component Date Value Ref Range Status    Specimen UA 11/08/2024 Urine, Clean Catch   Final    Color, UA 11/08/2024 Yellow  Yellow, Straw, Angela Final    Appearance, UA 11/08/2024 Clear  Clear Final    pH, UA 11/08/2024 6.0  5.0 - 8.0 Final    Specific Gravity, UA 11/08/2024 1.020  1.005 - 1.030 Final    Protein, UA 11/08/2024 2+ (A)  Negative Final    Comment: Recommend a 24 hour urine protein or a urine   protein/creatinine ratio if globulin induced proteinuria is  clinically suspected.      Glucose, UA 11/08/2024 3+ (A)  Negative Final    Ketones, UA 11/08/2024 Negative  Negative Final    Bilirubin (UA) 11/08/2024 Negative  Negative Final    Occult Blood UA 11/08/2024 Negative  Negative Final    Nitrite, UA 11/08/2024 Negative  Negative Final    Leukocytes, UA 11/08/2024 Negative  Negative Final    Microalbumin, Urine 11/08/2024 743.0  ug/mL Final    Creatinine, Urine 11/08/2024 158.0  23.0 - 375.0 mg/dL Final    Microalb/Creat Ratio 11/08/2024 470.3 (H)  0.0 - 30.0 ug/mg Final    RBC, UA 11/08/2024 1  0 - 4 /hpf Final    WBC, UA 11/08/2024 2  0 - 5 /hpf Final    Bacteria 11/08/2024 Occasional  None-Occ /hpf Final    Yeast, UA 11/08/2024 None  None Final    Hyaline Casts, UA 11/08/2024 0  0-1/lpf /lpf Final    Microscopic Comment  11/08/2024 SEE COMMENT   Final    Comment: Other formed elements not mentioned in the report are not   present in the microscopic examination.      Lab Visit on 11/08/2024   Component Date Value Ref Range Status    Hemoglobin A1C 11/08/2024 5.7 (H)  4.0 - 5.6 % Final    Comment: ADA Screening Guidelines:  5.7-6.4%  Consistent with prediabetes  >or=6.5%  Consistent with diabetes    High levels of fetal hemoglobin interfere with the HbA1C  assay. Heterozygous hemoglobin variants (HbS, HgC, etc)do  not significantly interfere with this assay.   However, presence of multiple variants may affect accuracy.      Estimated Avg Glucose 11/08/2024 117  68 - 131 mg/dL Final    WBC 11/08/2024 8.14  3.90 - 12.70 K/uL Final    RBC 11/08/2024 4.50 (L)  4.60 - 6.20 M/uL Final    Hemoglobin 11/08/2024 12.7 (L)  14.0 - 18.0 g/dL Final    Hematocrit 11/08/2024 41.0  40.0 - 54.0 % Final    MCV 11/08/2024 91  82 - 98 fL Final    MCH 11/08/2024 28.2  27.0 - 31.0 pg Final    MCHC 11/08/2024 31.0 (L)  32.0 - 36.0 g/dL Final    RDW 11/08/2024 14.8 (H)  11.5 - 14.5 % Final    Platelets 11/08/2024 130 (L)  150 - 450 K/uL Final    MPV 11/08/2024 12.7  9.2 - 12.9 fL Final    Immature Granulocytes 11/08/2024 0.4  0.0 - 0.5 % Final    Gran # (ANC) 11/08/2024 5.3  1.8 - 7.7 K/uL Final    Immature Grans (Abs) 11/08/2024 0.03  0.00 - 0.04 K/uL Final    Comment: Mild elevation in immature granulocytes is non specific and   can be seen in a variety of conditions including stress response,   acute inflammation, trauma and pregnancy. Correlation with other   laboratory and clinical findings is essential.      Lymph # 11/08/2024 2.1  1.0 - 4.8 K/uL Final    Mono # 11/08/2024 0.6  0.3 - 1.0 K/uL Final    Eos # 11/08/2024 0.1  0.0 - 0.5 K/uL Final    Baso # 11/08/2024 0.01  0.00 - 0.20 K/uL Final    nRBC 11/08/2024 0  0 /100 WBC Final    Gran % 11/08/2024 65.0  38.0 - 73.0 % Final    Lymph % 11/08/2024 26.0  18.0 - 48.0 % Final    Mono % 11/08/2024 7.1   4.0 - 15.0 % Final    Eosinophil % 11/08/2024 1.4  0.0 - 8.0 % Final    Basophil % 11/08/2024 0.1  0.0 - 1.9 % Final    Differential Method 11/08/2024 Automated   Final    Sodium 11/08/2024 141  136 - 145 mmol/L Final    Potassium 11/08/2024 4.1  3.5 - 5.1 mmol/L Final    Chloride 11/08/2024 106  95 - 110 mmol/L Final    CO2 11/08/2024 20 (L)  23 - 29 mmol/L Final    Glucose 11/08/2024 107  70 - 110 mg/dL Final    BUN 11/08/2024 32 (H)  8 - 23 mg/dL Final    Creatinine 11/08/2024 2.7 (H)  0.5 - 1.4 mg/dL Final    Calcium 11/08/2024 9.2  8.7 - 10.5 mg/dL Final    Total Protein 11/08/2024 7.0  6.0 - 8.4 g/dL Final    Albumin 11/08/2024 3.0 (L)  3.5 - 5.2 g/dL Final    Total Bilirubin 11/08/2024 0.2  0.1 - 1.0 mg/dL Final    Comment: For infants and newborns, interpretation of results should be based  on gestational age, weight and in agreement with clinical  observations.    Premature Infant recommended reference ranges:  Up to 24 hours.............<8.0 mg/dL  Up to 48 hours............<12.0 mg/dL  3-5 days..................<15.0 mg/dL  6-29 days.................<15.0 mg/dL      Alkaline Phosphatase 11/08/2024 81  40 - 150 U/L Final    AST 11/08/2024 13  10 - 40 U/L Final    ALT 11/08/2024 13  10 - 44 U/L Final    eGFR 11/08/2024 25.0 (A)  >60 mL/min/1.73 m^2 Final    Anion Gap 11/08/2024 15  8 - 16 mmol/L Final    Cholesterol 11/08/2024 144  120 - 199 mg/dL Final    Comment: The National Cholesterol Education Program (NCEP) has set the  following guidelines (reference ranges) for Cholesterol:  Optimal.....................<200 mg/dL  Borderline High.............200-239 mg/dL  High........................> or = 240 mg/dL      Triglycerides 11/08/2024 156 (H)  30 - 150 mg/dL Final    Comment: The National Cholesterol Education Program (NCEP) has set the  following guidelines (reference values) for triglycerides:  Normal......................<150 mg/dL  Borderline High.............150-199  mg/dL  High........................200-499 mg/dL      HDL 11/08/2024 39 (L)  40 - 75 mg/dL Final    Comment: The National Cholesterol Education Program (NCEP) has set the  following guidelines (reference values) for HDL Cholesterol:  Low...............<40 mg/dL  Optimal...........>60 mg/dL      LDL Cholesterol 11/08/2024 73.8  63.0 - 159.0 mg/dL Final    Comment: The National Cholesterol Education Program (NCEP) has set the  following guidelines (reference values) for LDL Cholesterol:  Optimal.......................<130 mg/dL  Borderline High...............130-159 mg/dL  High..........................160-189 mg/dL  Very High.....................>190 mg/dL      HDL/Cholesterol Ratio 11/08/2024 27.1  20.0 - 50.0 % Final    Total Cholesterol/HDL Ratio 11/08/2024 3.7  2.0 - 5.0 Final    Non-HDL Cholesterol 11/08/2024 105  mg/dL Final    Comment: Risk category and Non-HDL cholesterol goals:  Coronary heart disease (CHD)or equivalent (10-year risk of CHD >20%):  Non-HDL cholesterol goal     <130 mg/dL  Two or more CHD risk factors and 10-year risk of CHD <= 20%:  Non-HDL cholesterol goal     <160 mg/dL  0 to 1 CHD risk factor:  Non-HDL cholesterol goal     <190 mg/dL      TSH 11/08/2024 0.846  0.400 - 4.000 uIU/mL Final    PSA, Screen 11/08/2024 3.7  0.00 - 4.00 ng/mL Final    Comment: The testing method is a chemiluminescent microparticle immunoassay   manufactured by [employer] and performed on the    or   [employer] system. Values obtained with different assay manufacturers   for   methods may be different and cannot be used interchangeably.  PSA Expected levels:  Hormonal Therapy: <0.05 ng/ml  Prostatectomy: <0.01 ng/ml  Radiation Therapy: <1.00 ng/ml      Uric Acid 11/08/2024 6.7  3.4 - 7.0 mg/dL Final    Vit D, 25-Hydroxy 11/08/2024 18 (L)  30 - 96 ng/mL Final    Comment: Vitamin D deficiency.........<10 ng/mL                              Vitamin D insufficiency......10-29 ng/mL       Vitamin D  sufficiency........> or equal to 30 ng/mL  Vitamin D toxicity............>100 ng/mL       Lab Results   Component Value Date    CREATININE 2.3 (H) 12/03/2024    BUN 43 (H) 12/03/2024     12/03/2024    K 4.3 12/03/2024     12/03/2024    CO2 21 (L) 12/03/2024         Assessment:     1. Type 2 diabetes mellitus with stage 3b chronic kidney disease, without long-term current use of insulin    2. JOSE MARIA (acute kidney injury)    3. Hypertension associated with diabetes    4. Increased prostate specific antigen (PSA) velocity    5. Hypogonadism in male    6. Erectile dysfunction, unspecified erectile dysfunction type    7. Nonobstructive coronary artery disease of native artery of native heart    8. PAF (paroxysmal atrial fibrillation)    9. Mild nonproliferative diabetic retinopathy of both eyes associated with type 2 diabetes mellitus, macular edema presence unspecified    10. Diabetic polyneuropathy associated with type 2 diabetes mellitus    11. Type 2 diabetes mellitus with stage 3a chronic kidney disease, with long-term current use of insulin    12. Chronic combined systolic and diastolic heart failure    13. SANDRITA (obstructive sleep apnea)    14. Psychophysiological insomnia    15. Indeterminate stage chronic open angle glaucoma, unspecified laterality    16. Gait instability    17. Osteoarthritis of spine with radiculopathy, lumbar region    18. Hyperlipidemia associated with type 2 diabetes mellitus    19. 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 associated with type 2 diabetes mellitus    20. Lumbar radiculopathy    21. Acquired absence of left great toe    22. Chronic gout of multiple sites, unspecified cause    23. Chronic respiratory failure with hypoxia, on home O2 therapy      Plan:   Type 2 diabetes mellitus with stage 3b chronic kidney disease, without long-term current use of insulin    JOSE MARIA (acute kidney injury)    Hypertension associated with diabetes  -      amLODIPine (NORVASC) 2.5 MG tablet; Take 1 tablet (2.5 mg total) by mouth once daily.  Dispense: 90 tablet; Refill: 3    Increased prostate specific antigen (PSA) velocity  -     Ambulatory referral/consult to Urology; Future; Expected date: 12/10/2024    Hypogonadism in male    Erectile dysfunction, unspecified erectile dysfunction type    Nonobstructive coronary artery disease of native artery of native heart    PAF (paroxysmal atrial fibrillation)    Mild nonproliferative diabetic retinopathy of both eyes associated with type 2 diabetes mellitus, macular edema presence unspecified    Diabetic polyneuropathy associated with type 2 diabetes mellitus    Type 2 diabetes mellitus with stage 3a chronic kidney disease, with long-term current use of insulin    Chronic combined systolic and diastolic heart failure    SANDRITA (obstructive sleep apnea)    Psychophysiological insomnia    Indeterminate stage chronic open angle glaucoma, unspecified laterality    Gait instability    Osteoarthritis of spine with radiculopathy, lumbar region    Hyperlipidemia associated with type 2 diabetes mellitus    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 Scheduled for appropriate follow ups.   -lisinopril 10mg and 30 was in his meds today. Advised to stop lisinopril 10mg.   Also had omeprazole 20mg and pantoprazole 40. Advised to stop the omeprazole and continue the pantoprazole.     Assessment & Plan    -reviewed all meds in detail with patient today    CHRONIC KIDNEY DISEASE:  - Explained importance of avoiding NSAIDs (ibuprofen, Aleve, Motrin) due to kidney function concerns.  - Referred to nephrology for follow-up on impaired kidney  function.    HYPERTENSION:  - Continued lisinopril 30 mg daily and amlodipine.  - Discontinued lisinopril 10 mg.    GASTROESOPHAGEAL REFLUX DISEASE:  - Started pantoprazole for acid reflux management.  - Discontinued omeprazole 20 mg daily.    PROSTATE CONCERNS:  - Discussed significance of elevated PSA velocity and need for prostate check.  - Referred to urology (Dr. Dickson) for prostate evaluation due to elevated PSA velocity.  - Follow up with urology (Dr. Dickson) for prostate evaluation.    OXYGEN DEPENDENCE:  - Contact pulmonology department regarding oxygen tank management and potential referral.    OTHER:  - Refilled methocarbamol 750 mg.  - Discontinued Lyrica. Pt on gabapentin.       Follow up if symptoms worsen or fail to improve.

## 2024-12-04 ENCOUNTER — TELEPHONE (OUTPATIENT)
Dept: PAIN MEDICINE | Facility: CLINIC | Age: 69
End: 2024-12-04
Payer: MEDICARE

## 2024-12-09 ENCOUNTER — TELEPHONE (OUTPATIENT)
Dept: OPHTHALMOLOGY | Facility: CLINIC | Age: 69
End: 2024-12-09
Payer: MEDICARE

## 2024-12-09 ENCOUNTER — DOCUMENT SCAN (OUTPATIENT)
Dept: HOME HEALTH SERVICES | Facility: HOSPITAL | Age: 69
End: 2024-12-09
Payer: MEDICARE

## 2024-12-09 ENCOUNTER — TELEPHONE (OUTPATIENT)
Dept: INTERNAL MEDICINE | Facility: CLINIC | Age: 69
End: 2024-12-09
Payer: MEDICARE

## 2024-12-09 NOTE — TELEPHONE ENCOUNTER
----- Message from Nhung sent at 12/9/2024  2:41 PM CST -----  Contact: SHAGGY JASON [[id number]]  ..Type:  Patient Requesting Call    Who Called: SHAGGY JASON [[id number]]  Does the patient know what this is regarding?: electric wheelchair not working properly, was told to contact PCP regarding sending referral to insurance company  Would the patient rather a call back or a response via [facility]?  call  Best Call Back Number: [phone]   Additional Information:

## 2024-12-09 NOTE — TELEPHONE ENCOUNTER
Spoke with the patient stated that his wheelchair is not working correctly the speed on the chair is not working. The patient need a letter from Dr Polanco stating the the patient wheel chair need repairs. The patient will call back with the fax number where the letter should be faxed.     Letter stating the patient wheelchair need repairs.

## 2024-12-10 NOTE — TELEPHONE ENCOUNTER
Spoke with the patient stated that he would like the bed removed from his house. The patient stated that he has had the bed for a few years and it do not work. The patient was informed to reach out to the company that brought the bed into his home. The patient stated that he do not remember the company. The patient was informed that I can reach out to [facility] and see if they brought a bed to his home.

## 2024-12-10 NOTE — TELEPHONE ENCOUNTER
----- Message from Dalton sent at 12/10/2024  3:28 PM CST -----  Contact: [phone]  Type:  Patient Returning Call    Who Called:SHAGGY JASON [[id number]]  Who Left Message for Patient:  Does the patient know what this is regarding?:need the nurse to call him about a hospital bed  Would the patient rather a call back or a response via [facility]? Call back  Best Call Back Number: [phone]  Additional Information: n [phone]

## 2024-12-12 ENCOUNTER — OUTSIDE PLACE OF SERVICE (OUTPATIENT)
Dept: OPHTHALMOLOGY | Facility: CLINIC | Age: 69
End: 2024-12-12
Payer: MEDICARE

## 2024-12-12 PROCEDURE — 65855 TRABECULOPLASTY LASER SURG: CPT | Mod: 50,,, | Performed by: OPHTHALMOLOGY

## 2024-12-16 ENCOUNTER — OFFICE VISIT (OUTPATIENT)
Dept: DIABETES | Facility: CLINIC | Age: 69
End: 2024-12-16
Payer: MEDICARE

## 2024-12-16 ENCOUNTER — TELEPHONE (OUTPATIENT)
Dept: INTERNAL MEDICINE | Facility: CLINIC | Age: 69
End: 2024-12-16
Payer: MEDICARE

## 2024-12-16 VITALS
BODY MASS INDEX: 30.67 KG/M2 | WEIGHT: 184.31 LBS | SYSTOLIC BLOOD PRESSURE: 145 MMHG | DIASTOLIC BLOOD PRESSURE: 87 MMHG | HEART RATE: 80 BPM

## 2024-12-16 DIAGNOSIS — E11.22 CKD STAGE 3 DUE TO TYPE 2 DIABETES MELLITUS: ICD-10-CM

## 2024-12-16 DIAGNOSIS — E78.5 HYPERLIPIDEMIA ASSOCIATED WITH TYPE 2 DIABETES MELLITUS: ICD-10-CM

## 2024-12-16 DIAGNOSIS — I25.118 CORONARY ARTERY DISEASE OF NATIVE ARTERY OF NATIVE HEART WITH STABLE ANGINA PECTORIS: ICD-10-CM

## 2024-12-16 DIAGNOSIS — E11.69 HYPERLIPIDEMIA ASSOCIATED WITH TYPE 2 DIABETES MELLITUS: ICD-10-CM

## 2024-12-16 DIAGNOSIS — Z79.4 TYPE 2 DIABETES MELLITUS WITH STAGE 3A CHRONIC KIDNEY DISEASE, WITH LONG-TERM CURRENT USE OF INSULIN: Primary | ICD-10-CM

## 2024-12-16 DIAGNOSIS — I15.2 HYPERTENSION ASSOCIATED WITH DIABETES: ICD-10-CM

## 2024-12-16 DIAGNOSIS — E11.42 DIABETIC POLYNEUROPATHY ASSOCIATED WITH TYPE 2 DIABETES MELLITUS: ICD-10-CM

## 2024-12-16 DIAGNOSIS — N18.30 CKD STAGE 3 DUE TO TYPE 2 DIABETES MELLITUS: ICD-10-CM

## 2024-12-16 DIAGNOSIS — E66.811 OBESITY (BMI 30.0-34.9): ICD-10-CM

## 2024-12-16 DIAGNOSIS — I50.32 CHRONIC DIASTOLIC HEART FAILURE: ICD-10-CM

## 2024-12-16 DIAGNOSIS — E11.3312 MODERATE NONPROLIFERATIVE DIABETIC RETINOPATHY OF LEFT EYE WITH MACULAR EDEMA ASSOCIATED WITH TYPE 2 DIABETES MELLITUS: ICD-10-CM

## 2024-12-16 DIAGNOSIS — E11.59 HYPERTENSION ASSOCIATED WITH DIABETES: ICD-10-CM

## 2024-12-16 DIAGNOSIS — E11.22 TYPE 2 DIABETES MELLITUS WITH STAGE 3A CHRONIC KIDNEY DISEASE, WITH LONG-TERM CURRENT USE OF INSULIN: Primary | ICD-10-CM

## 2024-12-16 DIAGNOSIS — N18.31 TYPE 2 DIABETES MELLITUS WITH STAGE 3A CHRONIC KIDNEY DISEASE, WITH LONG-TERM CURRENT USE OF INSULIN: Primary | ICD-10-CM

## 2024-12-16 LAB — GLUCOSE SERPL-MCNC: 96 MG/DL (ref 70–110)

## 2024-12-16 PROCEDURE — 3008F BODY MASS INDEX DOCD: CPT | Mod: CPTII,S$GLB,, | Performed by: PHYSICIAN ASSISTANT

## 2024-12-16 PROCEDURE — 1125F AMNT PAIN NOTED PAIN PRSNT: CPT | Mod: CPTII,S$GLB,, | Performed by: PHYSICIAN ASSISTANT

## 2024-12-16 PROCEDURE — 99214 OFFICE O/P EST MOD 30 MIN: CPT | Mod: S$GLB,,, | Performed by: PHYSICIAN ASSISTANT

## 2024-12-16 PROCEDURE — 3079F DIAST BP 80-89 MM HG: CPT | Mod: CPTII,S$GLB,, | Performed by: PHYSICIAN ASSISTANT

## 2024-12-16 PROCEDURE — 82962 GLUCOSE BLOOD TEST: CPT | Mod: S$GLB,,, | Performed by: PHYSICIAN ASSISTANT

## 2024-12-16 PROCEDURE — 1159F MED LIST DOCD IN RCRD: CPT | Mod: CPTII,S$GLB,, | Performed by: PHYSICIAN ASSISTANT

## 2024-12-16 PROCEDURE — 4010F ACE/ARB THERAPY RXD/TAKEN: CPT | Mod: CPTII,S$GLB,, | Performed by: PHYSICIAN ASSISTANT

## 2024-12-16 PROCEDURE — 99999 PR PBB SHADOW E&M-EST. PATIENT-LVL V: CPT | Mod: PBBFAC,,, | Performed by: PHYSICIAN ASSISTANT

## 2024-12-16 PROCEDURE — G2211 COMPLEX E/M VISIT ADD ON: HCPCS | Mod: S$GLB,,, | Performed by: PHYSICIAN ASSISTANT

## 2024-12-16 PROCEDURE — 3066F NEPHROPATHY DOC TX: CPT | Mod: CPTII,S$GLB,, | Performed by: PHYSICIAN ASSISTANT

## 2024-12-16 PROCEDURE — 3077F SYST BP >= 140 MM HG: CPT | Mod: CPTII,S$GLB,, | Performed by: PHYSICIAN ASSISTANT

## 2024-12-16 PROCEDURE — 3062F POS MACROALBUMINURIA REV: CPT | Mod: CPTII,S$GLB,, | Performed by: PHYSICIAN ASSISTANT

## 2024-12-16 PROCEDURE — 3288F FALL RISK ASSESSMENT DOCD: CPT | Mod: CPTII,S$GLB,, | Performed by: PHYSICIAN ASSISTANT

## 2024-12-16 PROCEDURE — 1101F PT FALLS ASSESS-DOCD LE1/YR: CPT | Mod: CPTII,S$GLB,, | Performed by: PHYSICIAN ASSISTANT

## 2024-12-16 PROCEDURE — 3044F HG A1C LEVEL LT 7.0%: CPT | Mod: CPTII,S$GLB,, | Performed by: PHYSICIAN ASSISTANT

## 2024-12-16 RX ORDER — SEMAGLUTIDE 2.68 MG/ML
2 INJECTION, SOLUTION SUBCUTANEOUS
Qty: 9 ML | Refills: 3 | Status: SHIPPED | OUTPATIENT
Start: 2024-12-16

## 2024-12-16 RX ORDER — BLOOD-GLUCOSE SENSOR
EACH MISCELLANEOUS
Qty: 3 EACH | Refills: 11 | Status: SHIPPED | OUTPATIENT
Start: 2024-12-16

## 2024-12-16 NOTE — PROGRESS NOTES
PCP: Cm Polanco MD    Subjective:     Chief Complaint: Diabetes - Established Patient    HISTORY OF PRESENT ILLNESS: 69 y.o.   male presenting for diabetes management visit.   The patient's last visit with me was on 10/6/2023.   Patient has issues with transportation.   Patient has had Type II diabetes since 2000.  Pertinent to decision making is the following comorbidities: HTN, HLD, CAD, CHF, CKD III, Obesity by BMI, ED and Parathryoid disorder and Hypogonadism   Patient has the following Diabetes complications: with diabetic chronic kidney disease, with diabetic polyneuropathy and with diabetic retinopathy; mild non-proliferative; without macular edema  He has attended diabetes education in the past.     Patient's most recent A1c of 5.7% was completed 1 months ago.   Patient states since His last A1c His blood glucose levels have been within range throughout the day .   Patient monitors blood glucose 4 times per day and Continuously with personal CGM Dexcom.    Patient blood glucose monitoring device will not be uploaded into [facility] today.   BG in clinic 96.   Patient reports all blood sugars at goal on Dexcom.    Patient endorses the following diabetes related symptoms:  None .   Patient is due today for the following diabetes-related health maintenance standards: COVID-19 Vaccine  and RSV vaccine .   He denies recent hospital admissions or emergency room visits.  He voices having hypoglycemia in the am when skipping meal.   Patient's concerns today include glycemic control.   Patient medication regimen is as below.     CURRENT DM MEDICATIONS:   Novolog correction dosing every 3 hours   Jardiance 25 mg daily  Ozempic 2 mg weekly    Novolog Correction Dosing every 3 hours as needed OUTSIDE OF EATING  If  - 250, may take 2 units of Novolog  If  - 300, may take 3 units of Novolog  If  - 350, may take 4 units of Novolog  If  - 400, may take 5 units of  Novolog  If +, may take 6 units of Novolog      Patient has failed the following Diabetes medications:   Bydureon  Metformin - GI  Glimepiride      Labs Reviewed.       Lab Results   Component Value Date    CPEPTIDE 4.37 07/20/2017     Lab Results   Component Value Date    GLUTAMICACID 0.01 07/20/2017          //  Weight: 83.6 kg (184 lb 4.9 oz), Body mass index is 30.67 kg/m².  His blood sugar in clinic today is:        Review of Systems   Constitutional:  Negative for activity change, appetite change, chills and fever.   HENT:  Negative for dental problem, mouth sores, nosebleeds, sore throat and trouble swallowing.    Eyes:  Negative for pain and discharge.   Respiratory:  Negative for shortness of breath, wheezing and stridor.    Cardiovascular:  Negative for chest pain, palpitations and leg swelling.   Gastrointestinal:  Negative for abdominal pain, diarrhea, nausea and vomiting.   Endocrine: Negative for polydipsia, polyphagia and polyuria.   Genitourinary:  Negative for dysuria, frequency and urgency.   Musculoskeletal:  Negative for joint swelling and myalgias.   Skin:  Negative for rash and wound.   Neurological:  Negative for dizziness, syncope, weakness and headaches.   Psychiatric/Behavioral:  Negative for behavioral problems and dysphoric mood.          Diabetes Management Status  Statin: Taking  ACE/ARB: Taking    Screening or Prevention Patient's value Goal Complete/Controlled?   HgA1C Testing and Control   Lab Results   Component Value Date    HGBA1C 5.7 (H) 11/08/2024      Annually/Less than 8% Yes   Lipid profile : 11/08/2024 Annually Yes   LDL control Lab Results   Component Value Date    LDLCALC 73.8 11/08/2024    Annually/Less than 100 mg/dl  Yes   Nephropathy screening Lab Results   Component Value Date    MICALBCREAT 470.3 (H) 11/08/2024     Lab Results   Component Value Date    PROTEINUA 1+ (A) 11/13/2024    Annually No   Blood pressure BP Readings from Last 1 Encounters:   12/16/24 (!)  150/81    Less than 140/90 Yes   Dilated retinal exam : 10/22/2024 Annually Yes    Foot exam   : 04/10/2024 Annually Yes     Social History     Socioeconomic History    Marital status: Legally     Number of children: 5   Occupational History    Occupation: disabled    Occupation: PT at [employer]    Tobacco Use    Smoking status: Never    Smokeless tobacco: Never   Substance and Sexual Activity    Alcohol use: Yes     Comment: rare, maybe holidays    Drug use: Not Currently     Types: Cocaine    Sexual activity: Not Currently     Partners: Female   Social History Narrative    Utilizing [employer] transportation which has been unreliable.      Social Drivers of Health     Financial Resource Strain: High Risk (11/13/2024)    Overall Financial Resource Strain (CARDIA)     Difficulty of Paying Living Expenses: Hard   Food Insecurity: Food Insecurity Present (11/13/2024)    Hunger Vital Sign     Worried About Running Out of Food in the Last Year: Often true     Ran Out of Food in the Last Year: Often true   Transportation Needs: Unmet Transportation Needs (11/13/2024)    TRANSPORTATION NEEDS     Transportation : Yes, it has kept me from medical appointments or from getting my medications.   Physical Activity: Inactive (12/7/2022)    Exercise Vital Sign     Days of Exercise per Week: 0 days     Minutes of Exercise per Session: 0 min   Stress: Stress Concern Present (11/13/2024)    [employer] - Occupational Stress Questionnaire     Feeling of Stress : Very much   Housing Stability: High Risk (11/13/2024)    Housing Stability Vital Sign     Unable to Pay for Housing in the Last Year: Yes     Homeless in the Last Year: No     Past Medical History:   Diagnosis Date    Arthritis     Asthma     Atrial fibrillation     Atrial fibrillation with RVR 08/07/2019    Carotid artery stenosis     CHF (congestive heart failure)     Chronic obstructive pulmonary disease 08/05/2020     Depression     Dermatomyositis     Diabetes mellitus, type 2 Diagnosed in 2000    Elevated coronary artery calcium score 02/14/2024    Elevated PSA     Follow up 07/30/2020    Hypertension     Myocardial infarction     2017    Sleep apnea        Objective:        Physical Exam  Constitutional:       General: He is not in acute distress.     Appearance: He is well-developed. He is not diaphoretic.   HENT:      Head: Normocephalic and atraumatic.      Right Ear: External ear normal.      Left Ear: External ear normal.      Nose: Nose normal.   Eyes:      General:         Right eye: No discharge.         Left eye: No discharge.      Pupils: Pupils are equal, round, and reactive to light.   Cardiovascular:      Rate and Rhythm: Normal rate and regular rhythm.      Heart sounds: Normal heart sounds.   Pulmonary:      Effort: Pulmonary effort is normal.      Breath sounds: Normal breath sounds.   Abdominal:      Palpations: Abdomen is soft.   Musculoskeletal:         General: Normal range of motion.      Cervical back: Normal range of motion and neck supple.   Skin:     General: Skin is warm and dry.      Capillary Refill: Capillary refill takes less than 2 seconds.   Neurological:      Mental Status: He is alert and oriented to person, place, and time. Mental status is at baseline.      Motor: No abnormal muscle tone.      Coordination: Coordination normal.   Psychiatric:         Mood and Affect: Mood normal.         Behavior: Behavior normal.         Thought Content: Thought content normal.         Judgment: Judgment normal.           Assessment / Plan:     Type 2 diabetes mellitus with stage 3a chronic kidney disease, with long-term current use of insulin  -     POCT Glucose, Hand-Held Device  -     DEXCOM G7 SENSOR Shefali; Change sensor every 10 days  Dispense: 3 each; Refill: 11  -     empagliflozin (JARDIANCE) 25 mg tablet; Take 1 tablet (25 mg total) by mouth once daily.  Dispense: 90 tablet; Refill: 3  -      semaglutide (OZEMPIC) 2 mg/dose (8 mg/3 mL) PnIj; Inject 2 mg into the skin every 7 days.  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 associated with type 2 diabetes mellitus    Diabetic polyneuropathy associated with type 2 diabetes mellitus    Chronic diastolic heart failure    Coronary artery disease of native artery of native heart with stable angina pectoris    Hypertension associated with diabetes    Hyperlipidemia associated with type 2 diabetes mellitus    CKD stage 3 due to type 2 diabetes mellitus    Obesity (BMI 30.0-34.9)        Additional Plan Details:    - POCT Glucose  - Encouraged continuation of lifestyle changes including regular exercise and limiting carbohydrates to 30-45 grams per meal threes times daily and 15 grams per snack with a limit of two daily.   - Encouraged continued monitoring of blood glucose with maintenance of 4 times daily and Continuously with personal CGM Dexcom.    - Current DM Medication Regimen: Continue Novolog correction dosing every 3 hours as below.  Continue Ozempic 2 mg weekly. Continue Jardiance 25 mg daily.   - Health Maintenance standards addressed today: COVID - 19 Vaccine - patient will schedule outside of [facility]  and RSV Vaccine  - Nursing Visit: Patient is below goal range for nursing visit for age group and will not need nursing visit at this time .   - Follow up in 2 months with A1c prior.    CURRENT DM MEDICATIONS:   Novolog correction dosing every 3 hours   Jardiance 25 mg daily  Ozempic 2 mg weekly    Novolog Correction Dosing every 3 hours as needed OUTSIDE OF EATING  If  - 250, may take 2 units of Novolog  If  - 300, may take 3 units of Novolog  If  - 350, may take 4 units of Novolog  If  - 400, may take 5 units of Novolog  If +, may take 6 units of Novolog    Blakeney McKnight, PA-C [facility]     A total of 30 minutes was spent in face to  face time, of which over 50 % was spent in counseling patient on disease process, complications, treatment, and side effects of medications.

## 2024-12-16 NOTE — TELEPHONE ENCOUNTER
----- Message from Dang sent at 12/16/2024  2:49 PM CST -----  Contact: Linda/Luisa Luna  Type:   Request a reply    Name of Caller: Linda Ingram Call Back Number:   Additional Information:  Linda is calling in rgd to the requested for a script to repair of the pt's power chair. Faxed in on the 12/10.  Please fax script back [phone]  if any questions call [phone].

## 2024-12-19 ENCOUNTER — HOSPITAL ENCOUNTER (INPATIENT)
Facility: HOSPITAL | Age: 69
LOS: 1 days | Discharge: HOME HEALTH | DRG: 313 | End: 2024-12-20
Attending: EMERGENCY MEDICINE | Admitting: FAMILY MEDICINE
Payer: MEDICARE

## 2024-12-19 DIAGNOSIS — R65.21 SEPSIS WITH ACUTE RENAL FAILURE AND SEPTIC SHOCK, DUE TO UNSPECIFIED ORGANISM, UNSPECIFIED ACUTE RENAL FAILURE TYPE: Primary | ICD-10-CM

## 2024-12-19 DIAGNOSIS — E11.49 TYPE II DIABETES MELLITUS WITH NEUROLOGICAL MANIFESTATIONS: ICD-10-CM

## 2024-12-19 DIAGNOSIS — I50.32 CHRONIC DIASTOLIC CONGESTIVE HEART FAILURE: ICD-10-CM

## 2024-12-19 DIAGNOSIS — R07.9 CHEST PAIN: ICD-10-CM

## 2024-12-19 DIAGNOSIS — A41.9 SEPSIS WITH ACUTE RENAL FAILURE AND SEPTIC SHOCK, DUE TO UNSPECIFIED ORGANISM, UNSPECIFIED ACUTE RENAL FAILURE TYPE: Primary | ICD-10-CM

## 2024-12-19 DIAGNOSIS — M51.26 HNP (HERNIATED NUCLEUS PULPOSUS), LUMBAR: ICD-10-CM

## 2024-12-19 DIAGNOSIS — J18.9 PNEUMONIA OF LOWER LOBE DUE TO INFECTIOUS ORGANISM, UNSPECIFIED LATERALITY: ICD-10-CM

## 2024-12-19 DIAGNOSIS — N17.9 SEPSIS WITH ACUTE RENAL FAILURE AND SEPTIC SHOCK, DUE TO UNSPECIFIED ORGANISM, UNSPECIFIED ACUTE RENAL FAILURE TYPE: Primary | ICD-10-CM

## 2024-12-19 PROBLEM — N18.9 CKD (CHRONIC KIDNEY DISEASE): Status: ACTIVE | Noted: 2020-04-29

## 2024-12-19 LAB
ALBUMIN SERPL BCP-MCNC: 3.2 G/DL (ref 3.5–5.2)
ALP SERPL-CCNC: 74 U/L (ref 40–150)
ALT SERPL W/O P-5'-P-CCNC: 22 U/L (ref 10–44)
ANION GAP SERPL CALC-SCNC: 13 MMOL/L (ref 8–16)
AST SERPL-CCNC: 28 U/L (ref 10–40)
BASOPHILS # BLD AUTO: 0.01 K/UL (ref 0–0.2)
BASOPHILS NFR BLD: 0.1 % (ref 0–1.9)
BILIRUB SERPL-MCNC: 0.3 MG/DL (ref 0.1–1)
BNP SERPL-MCNC: 101 PG/ML (ref 0–99)
BUN SERPL-MCNC: 24 MG/DL (ref 8–23)
CALCIUM SERPL-MCNC: 8.7 MG/DL (ref 8.7–10.5)
CHLORIDE SERPL-SCNC: 108 MMOL/L (ref 95–110)
CO2 SERPL-SCNC: 19 MMOL/L (ref 23–29)
CREAT SERPL-MCNC: 2.2 MG/DL (ref 0.5–1.4)
DIFFERENTIAL METHOD BLD: ABNORMAL
EOSINOPHIL # BLD AUTO: 0.1 K/UL (ref 0–0.5)
EOSINOPHIL NFR BLD: 0.7 % (ref 0–8)
ERYTHROCYTE [DISTWIDTH] IN BLOOD BY AUTOMATED COUNT: 15.8 % (ref 11.5–14.5)
EST. GFR  (NO RACE VARIABLE): 32 ML/MIN/1.73 M^2
GLUCOSE SERPL-MCNC: 118 MG/DL (ref 70–110)
HCT VFR BLD AUTO: 39.7 % (ref 40–54)
HGB BLD-MCNC: 12.4 G/DL (ref 14–18)
IMM GRANULOCYTES # BLD AUTO: 0.05 K/UL (ref 0–0.04)
IMM GRANULOCYTES NFR BLD AUTO: 0.6 % (ref 0–0.5)
INFLUENZA A, MOLECULAR: NEGATIVE
INFLUENZA B, MOLECULAR: NEGATIVE
INR PPP: 1 (ref 0.8–1.2)
LACTATE SERPL-SCNC: 1.2 MMOL/L (ref 0.5–2.2)
LACTATE SERPL-SCNC: 1.4 MMOL/L (ref 0.5–2.2)
LYMPHOCYTES # BLD AUTO: 2.4 K/UL (ref 1–4.8)
LYMPHOCYTES NFR BLD: 27.4 % (ref 18–48)
MCH RBC QN AUTO: 29 PG (ref 27–31)
MCHC RBC AUTO-ENTMCNC: 31.2 G/DL (ref 32–36)
MCV RBC AUTO: 93 FL (ref 82–98)
MONOCYTES # BLD AUTO: 0.7 K/UL (ref 0.3–1)
MONOCYTES NFR BLD: 8.4 % (ref 4–15)
NEUTROPHILS # BLD AUTO: 5.6 K/UL (ref 1.8–7.7)
NEUTROPHILS NFR BLD: 62.8 % (ref 38–73)
NRBC BLD-RTO: 0 /100 WBC
PLATELET # BLD AUTO: 193 K/UL (ref 150–450)
PMV BLD AUTO: 10.3 FL (ref 9.2–12.9)
POCT GLUCOSE: 106 MG/DL (ref 70–110)
POTASSIUM SERPL-SCNC: 4.5 MMOL/L (ref 3.5–5.1)
PROCALCITONIN SERPL IA-MCNC: 0.1 NG/ML
PROT SERPL-MCNC: 7.6 G/DL (ref 6–8.4)
PROTHROMBIN TIME: 11.1 SEC (ref 9–12.5)
RBC # BLD AUTO: 4.28 M/UL (ref 4.6–6.2)
SARS-COV-2 RDRP RESP QL NAA+PROBE: NEGATIVE
SODIUM SERPL-SCNC: 140 MMOL/L (ref 136–145)
SPECIMEN SOURCE: NORMAL
TROPONIN I SERPL DL<=0.01 NG/ML-MCNC: 0.1 NG/ML (ref 0–0.03)
TROPONIN I SERPL DL<=0.01 NG/ML-MCNC: 0.11 NG/ML (ref 0–0.03)
TROPONIN I SERPL DL<=0.01 NG/ML-MCNC: 0.11 NG/ML (ref 0–0.03)
TROPONIN I SERPL DL<=0.01 NG/ML-MCNC: 0.12 NG/ML (ref 0–0.03)
WBC # BLD AUTO: 8.84 K/UL (ref 3.9–12.7)

## 2024-12-19 PROCEDURE — 25000003 PHARM REV CODE 250: Performed by: EMERGENCY MEDICINE

## 2024-12-19 PROCEDURE — 63600175 PHARM REV CODE 636 W HCPCS: Performed by: EMERGENCY MEDICINE

## 2024-12-19 PROCEDURE — 99285 EMERGENCY DEPT VISIT HI MDM: CPT | Mod: 25

## 2024-12-19 PROCEDURE — 84484 ASSAY OF TROPONIN QUANT: CPT | Mod: 91 | Performed by: NURSE PRACTITIONER

## 2024-12-19 PROCEDURE — 87502 INFLUENZA DNA AMP PROBE: CPT | Performed by: EMERGENCY MEDICINE

## 2024-12-19 PROCEDURE — 85025 COMPLETE CBC W/AUTO DIFF WBC: CPT | Performed by: EMERGENCY MEDICINE

## 2024-12-19 PROCEDURE — 21400001 HC TELEMETRY ROOM

## 2024-12-19 PROCEDURE — 87040 BLOOD CULTURE FOR BACTERIA: CPT | Mod: 59 | Performed by: EMERGENCY MEDICINE

## 2024-12-19 PROCEDURE — 84145 PROCALCITONIN (PCT): CPT | Performed by: EMERGENCY MEDICINE

## 2024-12-19 PROCEDURE — 82962 GLUCOSE BLOOD TEST: CPT

## 2024-12-19 PROCEDURE — 36415 COLL VENOUS BLD VENIPUNCTURE: CPT | Performed by: NURSE PRACTITIONER

## 2024-12-19 PROCEDURE — 96361 HYDRATE IV INFUSION ADD-ON: CPT

## 2024-12-19 PROCEDURE — 84484 ASSAY OF TROPONIN QUANT: CPT | Mod: 91 | Performed by: EMERGENCY MEDICINE

## 2024-12-19 PROCEDURE — 83605 ASSAY OF LACTIC ACID: CPT | Performed by: EMERGENCY MEDICINE

## 2024-12-19 PROCEDURE — 93005 ELECTROCARDIOGRAM TRACING: CPT

## 2024-12-19 PROCEDURE — 87635 SARS-COV-2 COVID-19 AMP PRB: CPT | Performed by: EMERGENCY MEDICINE

## 2024-12-19 PROCEDURE — 96374 THER/PROPH/DIAG INJ IV PUSH: CPT

## 2024-12-19 PROCEDURE — 83880 ASSAY OF NATRIURETIC PEPTIDE: CPT | Performed by: EMERGENCY MEDICINE

## 2024-12-19 PROCEDURE — 84484 ASSAY OF TROPONIN QUANT: CPT | Performed by: EMERGENCY MEDICINE

## 2024-12-19 PROCEDURE — 25000003 PHARM REV CODE 250: Performed by: NURSE PRACTITIONER

## 2024-12-19 PROCEDURE — 99223 1ST HOSP IP/OBS HIGH 75: CPT | Mod: ,,, | Performed by: INTERNAL MEDICINE

## 2024-12-19 PROCEDURE — 80053 COMPREHEN METABOLIC PANEL: CPT | Performed by: EMERGENCY MEDICINE

## 2024-12-19 PROCEDURE — 93010 ELECTROCARDIOGRAM REPORT: CPT | Mod: ,,, | Performed by: INTERNAL MEDICINE

## 2024-12-19 PROCEDURE — 85610 PROTHROMBIN TIME: CPT | Performed by: EMERGENCY MEDICINE

## 2024-12-19 RX ORDER — ISOSORBIDE MONONITRATE 30 MG/1
30 TABLET, EXTENDED RELEASE ORAL DAILY
Status: DISCONTINUED | OUTPATIENT
Start: 2024-12-19 | End: 2024-12-20 | Stop reason: HOSPADM

## 2024-12-19 RX ORDER — SODIUM CHLORIDE 0.9 % (FLUSH) 0.9 %
10 SYRINGE (ML) INJECTION
Status: DISCONTINUED | OUTPATIENT
Start: 2024-12-19 | End: 2024-12-20 | Stop reason: HOSPADM

## 2024-12-19 RX ORDER — SODIUM CHLORIDE 9 MG/ML
500 INJECTION, SOLUTION INTRAVENOUS
Status: COMPLETED | OUTPATIENT
Start: 2024-12-19 | End: 2024-12-19

## 2024-12-19 RX ORDER — ACETAMINOPHEN 325 MG/1
650 TABLET ORAL EVERY 8 HOURS PRN
Status: DISCONTINUED | OUTPATIENT
Start: 2024-12-19 | End: 2024-12-20 | Stop reason: HOSPADM

## 2024-12-19 RX ORDER — CEFEPIME HYDROCHLORIDE 2 G/1
2 INJECTION, POWDER, FOR SOLUTION INTRAVENOUS
Status: COMPLETED | OUTPATIENT
Start: 2024-12-19 | End: 2024-12-19

## 2024-12-19 RX ORDER — ATORVASTATIN CALCIUM 40 MG/1
40 TABLET, FILM COATED ORAL NIGHTLY
Status: DISCONTINUED | OUTPATIENT
Start: 2024-12-19 | End: 2024-12-20 | Stop reason: HOSPADM

## 2024-12-19 RX ORDER — ALLOPURINOL 100 MG/1
100 TABLET ORAL DAILY
Status: DISCONTINUED | OUTPATIENT
Start: 2024-12-19 | End: 2024-12-20 | Stop reason: HOSPADM

## 2024-12-19 RX ORDER — GABAPENTIN 400 MG/1
800 CAPSULE ORAL 3 TIMES DAILY
Status: DISCONTINUED | OUTPATIENT
Start: 2024-12-19 | End: 2024-12-20

## 2024-12-19 RX ORDER — RANOLAZINE 500 MG/1
1000 TABLET, EXTENDED RELEASE ORAL 2 TIMES DAILY
Status: DISCONTINUED | OUTPATIENT
Start: 2024-12-19 | End: 2024-12-20 | Stop reason: HOSPADM

## 2024-12-19 RX ORDER — TOPIRAMATE 25 MG/1
25 TABLET ORAL 2 TIMES DAILY
Status: DISCONTINUED | OUTPATIENT
Start: 2024-12-19 | End: 2024-12-20 | Stop reason: HOSPADM

## 2024-12-19 RX ORDER — SERTRALINE HYDROCHLORIDE 50 MG/1
100 TABLET, FILM COATED ORAL NIGHTLY
Status: DISCONTINUED | OUTPATIENT
Start: 2024-12-19 | End: 2024-12-20 | Stop reason: HOSPADM

## 2024-12-19 RX ORDER — ONDANSETRON HYDROCHLORIDE 2 MG/ML
4 INJECTION, SOLUTION INTRAVENOUS EVERY 8 HOURS PRN
Status: DISCONTINUED | OUTPATIENT
Start: 2024-12-19 | End: 2024-12-20 | Stop reason: HOSPADM

## 2024-12-19 RX ORDER — FUROSEMIDE 40 MG/1
40 TABLET ORAL 2 TIMES DAILY
Status: DISCONTINUED | OUTPATIENT
Start: 2024-12-19 | End: 2024-12-20 | Stop reason: HOSPADM

## 2024-12-19 RX ORDER — LATANOPROST 50 UG/ML
1 SOLUTION/ DROPS OPHTHALMIC NIGHTLY
Status: DISCONTINUED | OUTPATIENT
Start: 2024-12-19 | End: 2024-12-20 | Stop reason: HOSPADM

## 2024-12-19 RX ORDER — PANTOPRAZOLE SODIUM 40 MG/1
40 TABLET, DELAYED RELEASE ORAL DAILY
Status: DISCONTINUED | OUTPATIENT
Start: 2024-12-19 | End: 2024-12-20 | Stop reason: HOSPADM

## 2024-12-19 RX ORDER — AMLODIPINE BESYLATE 2.5 MG/1
2.5 TABLET ORAL DAILY
Status: DISCONTINUED | OUTPATIENT
Start: 2024-12-19 | End: 2024-12-20 | Stop reason: HOSPADM

## 2024-12-19 RX ORDER — TRAZODONE HYDROCHLORIDE 100 MG/1
200 TABLET ORAL NIGHTLY
Status: DISCONTINUED | OUTPATIENT
Start: 2024-12-19 | End: 2024-12-20 | Stop reason: HOSPADM

## 2024-12-19 RX ORDER — METHOCARBAMOL 750 MG/1
750 TABLET, FILM COATED ORAL 2 TIMES DAILY PRN
Status: DISCONTINUED | OUTPATIENT
Start: 2024-12-19 | End: 2024-12-20 | Stop reason: HOSPADM

## 2024-12-19 RX ADMIN — SODIUM CHLORIDE 345 ML: 9 INJECTION, SOLUTION INTRAVENOUS at 07:12

## 2024-12-19 RX ADMIN — ISOSORBIDE MONONITRATE 30 MG: 30 TABLET, EXTENDED RELEASE ORAL at 05:12

## 2024-12-19 RX ADMIN — SODIUM CHLORIDE 500 ML: 0.9 INJECTION, SOLUTION INTRAVENOUS at 06:12

## 2024-12-19 RX ADMIN — TRAZODONE HYDROCHLORIDE 200 MG: 100 TABLET ORAL at 08:12

## 2024-12-19 RX ADMIN — ALLOPURINOL 100 MG: 100 TABLET ORAL at 05:12

## 2024-12-19 RX ADMIN — PANTOPRAZOLE SODIUM 40 MG: 40 TABLET, DELAYED RELEASE ORAL at 05:12

## 2024-12-19 RX ADMIN — AMLODIPINE BESYLATE 2.5 MG: 2.5 TABLET ORAL at 05:12

## 2024-12-19 RX ADMIN — GABAPENTIN 800 MG: 400 CAPSULE ORAL at 08:12

## 2024-12-19 RX ADMIN — DOXYCYCLINE 100 MG: 100 INJECTION, POWDER, LYOPHILIZED, FOR SOLUTION INTRAVENOUS at 11:12

## 2024-12-19 RX ADMIN — APIXABAN 5 MG: 2.5 TABLET, FILM COATED ORAL at 08:12

## 2024-12-19 RX ADMIN — FUROSEMIDE 40 MG: 40 TABLET ORAL at 08:12

## 2024-12-19 RX ADMIN — ATORVASTATIN CALCIUM 40 MG: 40 TABLET, FILM COATED ORAL at 08:12

## 2024-12-19 RX ADMIN — CEFEPIME 2 G: 2 INJECTION, POWDER, FOR SOLUTION INTRAVENOUS at 09:12

## 2024-12-19 RX ADMIN — SODIUM CHLORIDE 500 ML: 9 INJECTION, SOLUTION INTRAVENOUS at 05:12

## 2024-12-19 RX ADMIN — SERTRALINE HYDROCHLORIDE 100 MG: 50 TABLET ORAL at 08:12

## 2024-12-19 RX ADMIN — TOPIRAMATE 25 MG: 25 TABLET, FILM COATED ORAL at 08:12

## 2024-12-19 RX ADMIN — LATANOPROST 1 DROP: 50 SOLUTION OPHTHALMIC at 08:12

## 2024-12-19 RX ADMIN — RANOLAZINE 1000 MG: 500 TABLET, FILM COATED, EXTENDED RELEASE ORAL at 08:12

## 2024-12-19 RX ADMIN — GABAPENTIN 800 MG: 400 CAPSULE ORAL at 05:12

## 2024-12-19 NOTE — HPI
"Crow Green is a 69 year old male who has  has a past medical history of Arthritis, Asthma, Atrial fibrillation, Atrial fibrillation with RVR, Carotid artery stenosis, CHF (congestive heart failure), Chronic obstructive pulmonary disease, Depression, Dermatomyositis, Diabetes mellitus, type 2, Elevated coronary artery calcium score, Elevated PSA, Follow up, Hypertension, Myocardial infarction, and Sleep apnea who presented to the Emergency Department for evaluation of chest pain. Onset at 4AM this morning. Pain located to left chest without radiation. Associated symptoms include SOB. He denies abdominal pain, N/V. Described pain as "ache." He recently had cataract surgery with Dr. Mtichell and states has not had any of his medications for 2 days due to "migraines." He currently denies migraine and/or headache. He is followed by Dr. Zhao outpatient. ED workup showed creatinine 2.2, , troponin 0.118, CXR stable chest. Possible low-grade infiltrates or atelectasis at the lung bases. Patient treated per protocol in ED for bed bugs which he was not aware he had. BP low upon arrival to ED. His blood pressure was as low as 71/48 but responded to sepsis fluids. Blood cultures obtained. Treated with a dose IV cefepime. He had an abnormal stress test in 08/2022. Previous TTE on 11/14/24 showed an EF of 50-55%, normal diastolic function. Pt admitted for chest pain.  "

## 2024-12-19 NOTE — SUBJECTIVE & OBJECTIVE
Past Medical History:   Diagnosis Date    Arthritis     Asthma     Atrial fibrillation     Atrial fibrillation with RVR 08/07/2019    Carotid artery stenosis     CHF (congestive heart failure)     Chronic obstructive pulmonary disease 08/05/2020    Depression     Dermatomyositis     Diabetes mellitus, type 2 Diagnosed in 2000    Elevated coronary artery calcium score 02/14/2024    Elevated PSA     Follow up 07/30/2020    Hypertension     Myocardial infarction     2017    Sleep apnea        Past Surgical History:   Procedure Laterality Date    ABLATION OF ARRHYTHMOGENIC FOCUS FOR ATRIAL FIBRILLATION N/A 2/27/2020    Procedure: Ablation atrial fibrillation;  Surgeon: Gio Brown MD;  Location: [facility] EP LAB;  Service: Cardiology;  Laterality: N/A;  afib, DAMIEN (cx if SR), PVI, PITO, anes, MB, 3 Prep    CHOLECYSTECTOMY      CLOSURE OF WOUND Left 7/1/2020    Procedure: CLOSURE, WOUND;  Surgeon: Barb Veras DPM;  Location: [facility] OR;  Service: Podiatry;  Laterality: Left;    COLONOSCOPY N/A 3/4/2022    Procedure: COLONOSCOPY;  Surgeon: Yolis Freitas MD;  Location: [facility] ENDO;  Service: Endoscopy;  Laterality: N/A;    ESOPHAGOGASTRODUODENOSCOPY N/A 3/4/2022    Procedure: EGD (ESOPHAGOGASTRODUODENOSCOPY);  Surgeon: Yolis Freitas MD;  Location: [facility] ENDO;  Service: Endoscopy;  Laterality: N/A;    INJECTION OF ANESTHETIC AGENT INTO SACROILIAC JOINT Left 3/24/2021    Procedure: Left BLOCK, SACROILIAC JOINT and bilateral rhomboid TPI;  Surgeon: Dillan Tsai MD;  Location: [facility] PAIN MGT;  Service: Pain Management;  Laterality: Left;    INTRALUMINAL GASTROINTESTINAL TRACT IMAGING VIA CAPSULE N/A 3/22/2022    Procedure: IMAGING PROCEDURE, GI TRACT, INTRALUMINAL, VIA CAPSULE;  Surgeon: Justice Martinez RN;  Location: [facility] ENDO;  Service: Endoscopy;  Laterality: N/A;    REVERSE TOTAL SHOULDER ARTHROPLASTY Left 1/10/2022    Procedure: ARTHROPLASTY, SHOULDER, TOTAL, REVERSE;  Surgeon: Anthony Alvarado MD;   Location: [facility] OR;  Service: Orthopedics;  Laterality: Left;  left reverse total shoulder arthroplasty     SELECTIVE INJECTION OF ANESTHETIC AGENT AROUND LUMBAR SPINAL NERVE ROOT BY TRANSFORAMINAL APPROACH Left 6/11/2020    Procedure: BLOCK, SPINAL NERVE ROOT, LUMBAR, SELECTIVE, TRANSFORAMINAL APPROACH Left L4-5, L5-S1 TFESI with RN IV sedation;  Surgeon: Dillan Tsai MD;  [facility];  Service: Pain Management;  Laterality: Left;    TOE AMPUTATION Left 6/29/2020    Procedure: AMPUTATION, TOE;  Surgeon: Barb Veras DPM;  Location: [facility] OR;  Service: Podiatry;  Laterality: Left;  great toe       Review of patient's allergies indicates:   Allergen Reactions    Protamine Hives     Urticaria, possible upper airway swelling       No current facility-administered medications on file prior to encounter.     Current Outpatient Medications on File Prior to Encounter   Medication Sig    albuterol (PROVENTIL) 2.5 mg /3 mL (0.083 %) nebulizer solution INHALE THE CONTENTS OF 1 VIAL VIA NEBULIZER EVERY 4 TO 6 HOURS AS NEEDED FOR WHEEZING OR FOR SHORTNESS OF BREATH    albuterol sulfate 90 mcg/actuation aebs Inhale 2 puffs into the lungs every 4 (four) hours as needed (WHEEZING OR SHORTNESS OF BREATH).    allopurinoL (ZYLOPRIM) 100 MG tablet TAKE 1 TABLET ONE TIME DAILY    amLODIPine (NORVASC) 2.5 MG tablet Take 1 tablet (2.5 mg total) by mouth once daily.    atorvastatin (LIPITOR) 40 MG tablet Take 1 tablet (40 mg total) by mouth every evening.    blood sugar diagnostic (TRUE METRIX GLUCOSE TEST STRIP) Strp Inject 1 each into the skin 4 (four) times daily.    blood-glucose meter Misc Use as instructed to test blood glucose meter daily.    brinzolamide-brimonidine (SIMBRINZA) 1-0.2 % DrpS Place 1 drop into both eyes 3 (three) times daily.    cyanocobalamin (VITAMIN B-12) 1000 MCG tablet Take 1 tablet by mouth once daily.    DEXCOM G7 SENSOR Shefali Change sensor every 10 days    ELIQUIS 5 mg Tab TAKE 1 TABLET TWICE  "DAILY    empagliflozin (JARDIANCE) 25 mg tablet Take 1 tablet (25 mg total) by mouth once daily.    furosemide (LASIX) 40 MG tablet Take 40 mg by mouth 2 (two) times daily. 2.5 tablets twice daily    gabapentin (NEURONTIN) 800 MG tablet Take 1 tablet (800 mg total) by mouth 3 (three) times daily.    INV allopurinol tablet Take 1 tablet by mouth once daily.    isosorbide mononitrate (IMDUR) 30 MG 24 hr tablet Take 1 tablet (30 mg total) by mouth once daily.    lancets (TRUEPLUS LANCETS) 33 gauge Misc Use to check blood sugar twice a day    lancets (TRUEPLUS LANCETS) 33 gauge Misc USE AS DIRECTED TO TEST BLOOD SUGAR FOUR TIMES DAILY    latanoprost 0.005 % ophthalmic solution Place 1 drop into both eyes every evening.    lisinopriL (PRINIVIL,ZESTRIL) 30 MG tablet TAKE 1 TABLET EVERY DAY.    methocarbamoL (ROBAXIN) 750 MG Tab Take 750 mg by mouth 2 (two) times daily as needed.    multivitamin-iron-folic acid (SENTRY) Tab Take 1 tablet by mouth once daily.    omalizumab (XOLAIR) 150 mg/mL injection Inject 2 mLs (300 mg total) into the skin every 14 (fourteen) days.    omalizumab (XOLAIR) 75 mg/0.5 mL injection Inject 0.5 mLs (75 mg total) into the skin every 14 (fourteen) days.    pantoprazole (PROTONIX) 40 MG tablet Take 1 tablet (40 mg total) by mouth once daily.    pen needle, diabetic (BD ULTRA-FINE SHORT PEN NEEDLE) 31 gauge x 5/16" Ndle 1 each by Misc.(Non-Drug; Combo Route) route 3 (three) times daily.    pep injection Inject 0.15 ml as directed     For compounding pharmacy use:   Add PAPAVERINE 30 mg  Add PHENTOLAMINE 1 mg  Add ALPROSTADIL 20 mcg    ranolazine (RANEXA) 1,000 mg Tb12 TAKE 1 TABLET TWICE DAILY    semaglutide (OZEMPIC) 2 mg/dose (8 mg/3 mL) PnIj Inject 2 mg into the skin every 7 days.    sertraline (ZOLOFT) 100 MG tablet Take 1 tablet (100 mg total) by mouth every evening.    topiramate (TOPAMAX) 25 MG tablet Take 1 tablet (25 mg total) by mouth 2 (two) times daily.    traMADoL (ULTRAM) 50 mg tablet " Take 50 mg by mouth every 6 (six) hours as needed.    traZODone (DESYREL) 100 MG tablet TAKE 2 TABLETS EVERY EVENING    [employer] AEROSOL DELIVERY SYSTEM Shefali USE AS DIRESTED    EPINEPHrine (EPIPEN) 0.3 mg/0.3 mL AtIn Inject 0.3 mLs (0.3 mg total) into the muscle once. for 1 dose    [DISCONTINUED] diclofenac sodium (VOLTAREN) 1 % Gel APPLY 2 GRAMS TOPICALLY FOUR TIMES DAILY (Patient not taking: Reported on 10/11/2024)     Family History       Problem Relation (Age of Onset)    Cancer Maternal Grandfather    Cataracts Mother, Father, Sister    Diabetes Mother, Sister, Maternal Aunt    Glaucoma Mother, Sister, Maternal Aunt    Hypertension Mother    No Known Problems Brother, Daughter, Son    Stroke Father, Sister          Tobacco Use    Smoking status: Never    Smokeless tobacco: Never   Substance and Sexual Activity    Alcohol use: Yes     Comment: rare, maybe holidays    Drug use: Not Currently     Types: Cocaine    Sexual activity: Not Currently     Partners: Female     Review of Systems   Constitutional:  Positive for activity change. Negative for chills and fever.   HENT:  Negative for trouble swallowing.    Eyes:  Negative for visual disturbance.   Respiratory:  Positive for shortness of breath. Negative for cough, choking, chest tightness and wheezing.    Cardiovascular:  Positive for chest pain.   Gastrointestinal:  Negative for abdominal pain, constipation, diarrhea, nausea and vomiting.   Genitourinary:  Negative for difficulty urinating.   Musculoskeletal:  Negative for arthralgias.   Neurological:  Negative for tremors, seizures, syncope, facial asymmetry, speech difficulty, light-headedness, numbness and headaches.   Psychiatric/Behavioral:  Negative for agitation, behavioral problems and confusion.      Objective:     Vital Signs (Most Recent):  Temp: 98 °F (36.7 °C) (12/19/24 1641)  Pulse: 78 (12/19/24 1641)  Resp: 20 (12/19/24 1641)  BP: 118/68 (12/19/24 1641)  SpO2: 96 % (12/19/24 1641) Vital Signs (24h  Range):  Temp:  [98 °F (36.7 °C)-98.5 °F (36.9 °C)] 98 °F (36.7 °C)  Pulse:  [74-86] 78  Resp:  [15-34] 20  SpO2:  [95 %-100 %] 96 %  BP: ()/(46-79) 118/68     Weight: 81.6 kg (180 lb)  Body mass index is 29.95 kg/m².     Physical Exam  Vitals reviewed.   Constitutional:       General: He is not in acute distress.     Appearance: He is ill-appearing.   HENT:      Head: Normocephalic.   Eyes:      Extraocular Movements: Extraocular movements intact.   Cardiovascular:      Rate and Rhythm: Normal rate.      Pulses: Normal pulses.   Pulmonary:      Effort: Pulmonary effort is normal. No respiratory distress.   Abdominal:      General: Bowel sounds are normal. There is no distension.      Palpations: Abdomen is soft.      Tenderness: There is no abdominal tenderness.   Genitourinary:     Comments: deferrred  Musculoskeletal:         General: Normal range of motion.      Cervical back: Neck supple.   Skin:     General: Skin is warm and dry.   Neurological:      Mental Status: He is alert and oriented to person, place, and time.   Psychiatric:         Mood and Affect: Mood normal.         Behavior: Behavior normal.         Thought Content: Thought content normal.                Significant Labs: All pertinent labs within the past 24 hours have been reviewed.  CBC:   Recent Labs   Lab 12/19/24  0533   WBC 8.84   HGB 12.4*   HCT 39.7*        CMP:   Recent Labs   Lab 12/19/24  0533      K 4.5      CO2 19*   *   BUN 24*   CREATININE 2.2*   CALCIUM 8.7   PROT 7.6   ALBUMIN 3.2*   BILITOT 0.3   ALKPHOS 74   AST 28   ALT 22   ANIONGAP 13     Lactic Acid:   Recent Labs   Lab 12/19/24  0810 12/19/24  1055   LACTATE 1.4 1.2     Troponin:   Recent Labs   Lab 12/19/24  0533 12/19/24  1055 12/19/24  1522   TROPONINI 0.118* 0.103* 0.112*       Significant Imaging:   Imaging Results              X-Ray Chest AP Portable (Final result)  Result time 12/19/24 07:12:40      Final result by Pedro Luis Liao  MD (Timothy) (12/19/24 07:12:40)                   Impression:      Stable chest.  Possible low-grade infiltrates or atelectasis at the lung bases.      Electronically signed by: Pedro Luis Liao MD  Date:    12/19/2024  Time:    07:12               Narrative:    EXAMINATION:  XR CHEST AP PORTABLE    CLINICAL HISTORY:  Chest pain, cp;    COMPARISON:  Chest, 11/14/2024.    FINDINGS:  One view.  Mild cardiomegaly.  Stable mild increased markings at the lung bases could represent low-grade infiltrate or atelectasis.

## 2024-12-19 NOTE — PHARMACY MED REC
"Admission Medication History     The home medication history was taken by Bogdan Hull.    You may go to "Admission" then "Reconcile Home Medications" tabs to review and/or act upon these items.     The home medication list has been updated by the Pharmacy department.   Please read ALL comments highlighted in yellow.   Please address this information as you see fit.    Feel free to contact us if you have any questions or require assistance.      Medications listed below were obtained from: Analytic software- [employer], Medical records, and Patient's pharmacy  Facility-Administered Medications Prior to Admission   Medication    omalizumab injection 300 mg    omalizumab injection 75 mg     PTA Medications   Medication Sig    albuterol (PROVENTIL) 2.5 mg /3 mL (0.083 %) nebulizer solution INHALE THE CONTENTS OF 1 VIAL VIA NEBULIZER EVERY 4 TO 6 HOURS AS NEEDED FOR WHEEZING OR FOR SHORTNESS OF BREATH    albuterol sulfate 90 mcg/actuation aebs Inhale 2 puffs into the lungs every 4 (four) hours as needed (WHEEZING OR SHORTNESS OF BREATH).    allopurinoL (ZYLOPRIM) 100 MG tablet TAKE 1 TABLET ONE TIME DAILY    amLODIPine (NORVASC) 2.5 MG tablet Take 1 tablet (2.5 mg total) by mouth once daily.    atorvastatin (LIPITOR) 40 MG tablet Take 1 tablet (40 mg total) by mouth every evening.    blood sugar diagnostic (TRUE METRIX GLUCOSE TEST STRIP) Strp Inject 1 each into the skin 4 (four) times daily.    blood-glucose meter Misc Use as instructed to test blood glucose meter daily.    brinzolamide-brimonidine (SIMBRINZA) 1-0.2 % DrpS Place 1 drop into both eyes 3 (three) times daily.    cyanocobalamin (VITAMIN B-12) 1000 MCG tablet Take 1 tablet by mouth once daily.    DEXCOM G7 SENSOR Shefali Change sensor every 10 days    ELIQUIS 5 mg Tab TAKE 1 TABLET TWICE DAILY    empagliflozin (JARDIANCE) 25 mg tablet Take 1 tablet (25 mg total) by mouth once daily.    furosemide (LASIX) 40 MG tablet Take 40 mg by mouth 2 (two) times daily. " "2.5 tablets twice daily    gabapentin (NEURONTIN) 800 MG tablet Take 1 tablet (800 mg total) by mouth 3 (three) times daily.    INV allopurinol tablet Take 1 tablet by mouth once daily.    isosorbide mononitrate (IMDUR) 30 MG 24 hr tablet Take 1 tablet (30 mg total) by mouth once daily.    lancets (TRUEPLUS LANCETS) 33 gauge Misc Use to check blood sugar twice a day    lancets (TRUEPLUS LANCETS) 33 gauge Misc USE AS DIRECTED TO TEST BLOOD SUGAR FOUR TIMES DAILY    latanoprost 0.005 % ophthalmic solution Place 1 drop into both eyes every evening.    lisinopriL (PRINIVIL,ZESTRIL) 30 MG tablet TAKE 1 TABLET EVERY DAY.    methocarbamoL (ROBAXIN) 750 MG Tab Take 750 mg by mouth 2 (two) times daily as needed.    multivitamin-iron-folic acid (SENTRY) Tab Take 1 tablet by mouth once daily.    omalizumab (XOLAIR) 150 mg/mL injection Inject 2 mLs (300 mg total) into the skin every 14 (fourteen) days.    omalizumab (XOLAIR) 75 mg/0.5 mL injection Inject 0.5 mLs (75 mg total) into the skin every 14 (fourteen) days.    pantoprazole (PROTONIX) 40 MG tablet Take 1 tablet (40 mg total) by mouth once daily.    pen needle, diabetic (BD ULTRA-FINE SHORT PEN NEEDLE) 31 gauge x 5/16" Ndle 1 each by Misc.(Non-Drug; Combo Route) route 3 (three) times daily.    pep injection Inject 0.15 ml as directed     For compounding pharmacy use:   Add PAPAVERINE 30 mg  Add PHENTOLAMINE 1 mg  Add ALPROSTADIL 20 mcg    ranolazine (RANEXA) 1,000 mg Tb12 TAKE 1 TABLET TWICE DAILY    semaglutide (OZEMPIC) 2 mg/dose (8 mg/3 mL) PnIj Inject 2 mg into the skin every 7 days.    sertraline (ZOLOFT) 100 MG tablet Take 1 tablet (100 mg total) by mouth every evening.    topiramate (TOPAMAX) 25 MG tablet Take 1 tablet (25 mg total) by mouth 2 (two) times daily.    traMADoL (ULTRAM) 50 mg tablet Take 50 mg by mouth every 6 (six) hours as needed.    traZODone (DESYREL) 100 MG tablet TAKE 2 TABLETS EVERY EVENING    VIOS AEROSOL DELIVERY SYSTEM Shefali USE AS DIRESTED    " EPINEPHrine (EPIPEN) 0.3 mg/0.3 mL AtIn Inject 0.3 mLs (0.3 mg total) into the muscle once. for 1 dose       Bogdan Hull  [id number]            .

## 2024-12-19 NOTE — Clinical Note
Diagnosis: Chest pain [718403]   Reason for IP Medical Treatment  (Clinical interventions that can only be accomplished in the IP setting? ) :: sepsis

## 2024-12-19 NOTE — HPI
presents to the Emergency Department for evaluation of L sided chest pain which onset around 4:00-5:00 pm upon waking up this morning. Pt states his pain has slightly relieved since arrival. Symptoms are now intermittent and 5/10 in severity. Pt now c/o of pain around his eyes. No mitigating or exacerbating factors reported. Associated sxs include dizziness. Patient denies any wounds, active bleeding, and all other sxs at this time. Pt is compliant with his medication. Prior Tx includes Aspirin via EMS en route. No further complaints or concerns at this time       CARDIOLOGY CONSULT FOR CHEST PAIN  PMH CAD nonobstructive and small vessel Dz per [place] done in 11/16, PAF s/p PVI in  by Dr. Brown, CHFpEF 45%, DM, hTN, HLD and obesity. Asthma and SANDRITA on CPAP f/u with Dr. Alicea.   Bnp 101  TROPONIN 0.103, CHRONICALLY ELEVATED  cR 2.2 hgb 12.4  POSITIVE TENDERNESS ON LOWER STERNAL AREA

## 2024-12-19 NOTE — ED NOTES
Pt resting in ER stretcher, aaox4, rr e/u, NAD noted. Pt remains on cardiac monitor with vss noted. Bed low and locked, call light in reach, side rails up x2. IV fluids infusing.  Urinal at the bedside for specimen collection.  Pt verbalized understanding of status and POC.  Pt requested blanket which was provided; denies further needs. Lights off at patient's request.  Will continue to monitor.

## 2024-12-19 NOTE — ED PROVIDER NOTES
SCRIBE #1 NOTE: IDewey, am scribing for, and in the presence of, Marina Mtz MD. I have scribed the HPI/ROS/PEx.    SCRIBE #2 NOTE: I, Rito Villeda, am scribing for, and in the presence of,  Shawna Armenta MD. I have scribed the remaining portions of the note not scribed by Scribe #1.      History     Chief Complaint   Patient presents with    Chest Pain     Patient complaining of chest burning/pain that woke him up from his sleep. Patient started complaining of dizziness while in ambulance.     Review of patient's allergies indicates:   Allergen Reactions    Protamine Hives     Urticaria, possible upper airway swelling         History of Present Illness     HPI    12/19/2024, 5:55 AM  History obtained from the patient      History of Present Illness: Crow Green is a 69 y.o. male patient with a PMHx of MI in 2017, HTN, T2DM, depression, CHF, COPD, carotid artery stenosis, A-fib with RVR, asthma, and arthritis who presents to the Emergency Department for evaluation of L sided chest pain which onset around 4:00-5:00 pm upon waking up this morning. Pt states his pain has slightly relieved since arrival. Symptoms are now intermittent and 5/10 in severity. Pt now c/o of pain around his eyes. No mitigating or exacerbating factors reported. Associated sxs include dizziness. Patient denies any wounds, active bleeding, and all other sxs at this time. Pt is compliant with his medication. Prior Tx includes Aspirin via EMS en route. No further complaints or concerns at this time.       Arrival mode: EMS    PCP: Cm Polanco MD        Past Medical History:  Past Medical History:   Diagnosis Date    Arthritis     Asthma     Atrial fibrillation     Atrial fibrillation with RVR 08/07/2019    Carotid artery stenosis     CHF (congestive heart failure)     Chronic obstructive pulmonary disease 08/05/2020    Depression     Dermatomyositis     Diabetes mellitus, type 2 Diagnosed in 2000    Elevated  coronary artery calcium score 02/14/2024    Elevated PSA     Follow up 07/30/2020    Hypertension     Myocardial infarction     2017    Sleep apnea        Past Surgical History:  Past Surgical History:   Procedure Laterality Date    ABLATION OF ARRHYTHMOGENIC FOCUS FOR ATRIAL FIBRILLATION N/A 2/27/2020    Procedure: Ablation atrial fibrillation;  Surgeon: Gio Brown MD;  Location: [facility] EP LAB;  Service: Cardiology;  Laterality: N/A;  afib, DAMIEN (cx if SR), PVI, PITO, anes, MB, 3 Prep    CHOLECYSTECTOMY      CLOSURE OF WOUND Left 7/1/2020    Procedure: CLOSURE, WOUND;  Surgeon: Barb Veras DPM;  Location: [facility] OR;  Service: Podiatry;  Laterality: Left;    COLONOSCOPY N/A 3/4/2022    Procedure: COLONOSCOPY;  Surgeon: Yolis Freitas MD;  Location: [facility] ENDO;  Service: Endoscopy;  Laterality: N/A;    ESOPHAGOGASTRODUODENOSCOPY N/A 3/4/2022    Procedure: EGD (ESOPHAGOGASTRODUODENOSCOPY);  Surgeon: Yolis Freitas MD;  Location: [facility] ENDO;  Service: Endoscopy;  Laterality: N/A;    INJECTION OF ANESTHETIC AGENT INTO SACROILIAC JOINT Left 3/24/2021    Procedure: Left BLOCK, SACROILIAC JOINT and bilateral rhomboid TPI;  Surgeon: Dillan Tsai MD;  Location: [facility] PAIN MGT;  Service: Pain Management;  Laterality: Left;    INTRALUMINAL GASTROINTESTINAL TRACT IMAGING VIA CAPSULE N/A 3/22/2022    Procedure: IMAGING PROCEDURE, GI TRACT, INTRALUMINAL, VIA CAPSULE;  Surgeon: Justice Martinez RN;  Location: [facility] ENDO;  Service: Endoscopy;  Laterality: N/A;    REVERSE TOTAL SHOULDER ARTHROPLASTY Left 1/10/2022    Procedure: ARTHROPLASTY, SHOULDER, TOTAL, REVERSE;  Surgeon: Anthony Alvarado MD;  Location: [facility] OR;  Service: Orthopedics;  Laterality: Left;  left reverse total shoulder arthroplasty     SELECTIVE INJECTION OF ANESTHETIC AGENT AROUND LUMBAR SPINAL NERVE ROOT BY TRANSFORAMINAL APPROACH Left 6/11/2020    Procedure: BLOCK, SPINAL NERVE ROOT, LUMBAR, SELECTIVE, TRANSFORAMINAL APPROACH Left L4-5, L5-S1  TFESI with RN IV sedation;  Surgeon: Dillan Tsai MD;  Location: [facility];  Service: Pain Management;  Laterality: Left;    TOE AMPUTATION Left 6/29/2020    Procedure: AMPUTATION, TOE;  Surgeon: Barb Veras DPM;  Location: [facility];  Service: Podiatry;  Laterality: Left;  great toe         Family History:  Family History   Problem Relation Name Age of Onset    Hypertension Mother      Glaucoma Mother      Cataracts Mother      Diabetes Mother      Cataracts Father      Stroke Father      Glaucoma Sister 4         x3    Cataracts Sister 4         x3    Diabetes Sister 4         x1    Stroke Sister 4         x1    No Known Problems Brother 2     No Known Problems Daughter 2     No Known Problems Son 3     Glaucoma Maternal Aunt      Diabetes Maternal Aunt      Cancer Maternal Grandfather          lung (smoker)    Prostate cancer Neg Hx      Heart disease Neg Hx      Kidney disease Neg Hx         Social History:  Social History     Tobacco Use    Smoking status: Never    Smokeless tobacco: Never   Substance and Sexual Activity    Alcohol use: Yes     Comment: rare, maybe holidays    Drug use: Not Currently     Types: Cocaine    Sexual activity: Not Currently     Partners: Female        Review of Systems     Review of Systems   Constitutional:  Negative for fever.   HENT:  Negative for sore throat.    Respiratory:  Negative for shortness of breath.    Cardiovascular:  Positive for chest pain (L sided).   Gastrointestinal:  Negative for nausea.   Genitourinary:  Negative for dysuria.   Musculoskeletal:  Negative for back pain.   Skin:  Negative for rash and wound.        (-) active bleeding   Neurological:  Positive for dizziness. Negative for weakness.   Hematological:  Does not bruise/bleed easily.   All other systems reviewed and are negative.       Physical Exam     Initial Vitals [12/19/24 0516]   BP Pulse Resp Temp SpO2   (!) 80/50 86 16 98 °F (36.7 °C) 96 %      MAP       --          Physical  Exam  Nursing Notes and Vital Signs Reviewed.  Constitutional: Patient is in no apparent distress. Well-developed and well-nourished.  Head: Atraumatic. Normocephalic.  Eyes: PERRL. EOM intact. Conjunctivae are not pale. No scleral icterus.  ENT: Mucous membranes are moist. Oropharynx is clear and symmetric.    Neck: Supple. Full ROM. No lymphadenopathy.  Cardiovascular: Regular rate. Regular rhythm. No murmurs, rubs, or gallops. Distal pulses are 2+ and symmetric.  Pulmonary/Chest: No respiratory distress. Clear to auscultation bilaterally. No wheezing or rales.  Abdominal: Soft and non-distended.  There is no tenderness.  No rebound, guarding, or rigidity. Good bowel sounds.  Genitourinary: No CVA tenderness  Musculoskeletal: Moves all extremities. No obvious deformities. No edema. No calf tenderness.  Skin: Warm and dry.  Neurological:  Alert, awake, and appropriate.  Normal speech.  No acute focal neurological deficits are appreciated.  Psychiatric: Normal affect. Good eye contact. Appropriate in content.     ED Course   Critical Care    Date/Time: 12/19/2024 9:48 AM    Performed by: Shawna Armenta DO  Authorized by: Shawna Armenta DO  Direct patient critical care time: 25 minutes  Additional history critical care time: 5 minutes  Ordering / reviewing critical care time: 5 minutes  Documentation critical care time: 5 minutes  Consulting other physicians critical care time: 5 minutes  Total critical care time (exclusive of procedural time) : 45 minutes  Critical care time was exclusive of separately billable procedures and treating other patients and teaching time.  Critical care was necessary to treat or prevent imminent or life-threatening deterioration of the following conditions: sepsis (Hypotension).  Critical care was time spent personally by me on the following activities: development of treatment plan with patient or surrogate, discussions with consultants, evaluation of patient's response to  treatment, examination of patient, obtaining history from patient or surrogate, ordering and performing treatments and interventions, ordering and review of laboratory studies, ordering and review of radiographic studies, pulse oximetry and re-evaluation of patient's condition.        ED Vital Signs:  Vitals:    12/19/24 1202 12/19/24 1402 12/19/24 1533 12/19/24 1547   BP: (!) 102/55 (!) 112/54 133/77    Pulse: 80 75 74 78   Resp: (!) 21 15 16    Temp:   98.5 °F (36.9 °C)    TempSrc:   Oral    SpO2: 98% 98% 95%    Weight:        12/19/24 1641 12/19/24 1938 12/19/24 2135 12/19/24 2338   BP: 118/68  122/64    Pulse: 78 78 78 72   Resp: 20  18    Temp: 98 °F (36.7 °C)  98 °F (36.7 °C)    TempSrc: Oral  Oral    SpO2: 96%  97%    Weight:        12/20/24 0036 12/20/24 0138 12/20/24 0338 12/20/24 0442   BP: 127/66   (!) 113/58   Pulse: 76 74 72 78   Resp: 18   18   Temp: 97.9 °F (36.6 °C)   98.1 °F (36.7 °C)   TempSrc: Oral   Oral   SpO2: (!) 94%   95%   Weight:        12/20/24 0538 12/20/24 0724 12/20/24 1158   BP:  122/60 (!) 116/59   Pulse: 84 76 80   Resp:  18 18   Temp:  97.9 °F (36.6 °C) 97.4 °F (36.3 °C)   TempSrc:  Oral Oral   SpO2:  (!) 93% (!) 92%   Weight:          Abnormal Lab Results:  Labs Reviewed   CBC W/ AUTO DIFFERENTIAL - Abnormal       Result Value    WBC 8.84      RBC 4.28 (*)     Hemoglobin 12.4 (*)     Hematocrit 39.7 (*)     MCV 93      MCH 29.0      MCHC 31.2 (*)     RDW 15.8 (*)     Platelets 193      MPV 10.3      Immature Granulocytes 0.6 (*)     Gran # (ANC) 5.6      Immature Grans (Abs) 0.05 (*)     Lymph # 2.4      Mono # 0.7      Eos # 0.1      Baso # 0.01      nRBC 0      Gran % 62.8      Lymph % 27.4      Mono % 8.4      Eosinophil % 0.7      Basophil % 0.1      Differential Method Automated     COMPREHENSIVE METABOLIC PANEL - Abnormal    Sodium 140      Potassium 4.5      Chloride 108      CO2 19 (*)     Glucose 118 (*)     BUN 24 (*)     Creatinine 2.2 (*)     Calcium 8.7      Total  Protein 7.6      Albumin 3.2 (*)     Total Bilirubin 0.3      Alkaline Phosphatase 74      AST 28      ALT 22      eGFR 32 (*)     Anion Gap 13     B-TYPE NATRIURETIC PEPTIDE - Abnormal     (*)    TROPONIN I - Abnormal    Troponin I 0.118 (*)    TROPONIN I - Abnormal    Troponin I 0.103 (*)    INFLUENZA A & B BY MOLECULAR    Influenza A, Molecular Negative      Influenza B, Molecular Negative      Flu A & B Source Nasal swab     PROTIME-INR    Prothrombin Time 11.1      INR 1.0     LACTIC ACID, PLASMA    Lactate (Lactic Acid) 1.4     PROCALCITONIN    Procalcitonin 0.10     SARS-COV-2 RNA AMPLIFICATION, QUAL    SARS-CoV-2 RNA, Amplification, Qual Negative     LACTIC ACID, PLASMA    Lactate (Lactic Acid) 1.2     DRUG SCREEN PANEL, URINE EMERGENCY   URINALYSIS, REFLEX TO URINE CULTURE   POCT GLUCOSE    POCT Glucose 106          All Lab Results:  Results for orders placed or performed during the hospital encounter of 12/19/24   EKG 12-lead    Collection Time: 12/19/24  5:23 AM   Result Value Ref Range    QRS Duration 142 ms    OHS QTC Calculation 500 ms   CBC auto differential    Collection Time: 12/19/24  5:33 AM   Result Value Ref Range    WBC 8.84 3.90 - 12.70 K/uL    RBC 4.28 (L) 4.60 - 6.20 M/uL    Hemoglobin 12.4 (L) 14.0 - 18.0 g/dL    Hematocrit 39.7 (L) 40.0 - 54.0 %    MCV 93 82 - 98 fL    MCH 29.0 27.0 - 31.0 pg    MCHC 31.2 (L) 32.0 - 36.0 g/dL    RDW 15.8 (H) 11.5 - 14.5 %    Platelets 193 150 - 450 K/uL    MPV 10.3 9.2 - 12.9 fL    Immature Granulocytes 0.6 (H) 0.0 - 0.5 %    Gran # (ANC) 5.6 1.8 - 7.7 K/uL    Immature Grans (Abs) 0.05 (H) 0.00 - 0.04 K/uL    Lymph # 2.4 1.0 - 4.8 K/uL    Mono # 0.7 0.3 - 1.0 K/uL    Eos # 0.1 0.0 - 0.5 K/uL    Baso # 0.01 0.00 - 0.20 K/uL    nRBC 0 0 /100 WBC    Gran % 62.8 38.0 - 73.0 %    Lymph % 27.4 18.0 - 48.0 %    Mono % 8.4 4.0 - 15.0 %    Eosinophil % 0.7 0.0 - 8.0 %    Basophil % 0.1 0.0 - 1.9 %    Differential Method Automated    Comprehensive metabolic  panel    Collection Time: 12/19/24  5:33 AM   Result Value Ref Range    Sodium 140 136 - 145 mmol/L    Potassium 4.5 3.5 - 5.1 mmol/L    Chloride 108 95 - 110 mmol/L    CO2 19 (L) 23 - 29 mmol/L    Glucose 118 (H) 70 - 110 mg/dL    BUN 24 (H) 8 - 23 mg/dL    Creatinine 2.2 (H) 0.5 - 1.4 mg/dL    Calcium 8.7 8.7 - 10.5 mg/dL    Total Protein 7.6 6.0 - 8.4 g/dL    Albumin 3.2 (L) 3.5 - 5.2 g/dL    Total Bilirubin 0.3 0.1 - 1.0 mg/dL    Alkaline Phosphatase 74 40 - 150 U/L    AST 28 10 - 40 U/L    ALT 22 10 - 44 U/L    eGFR 32 (A) >60 mL/min/1.73 m^2    Anion Gap 13 8 - 16 mmol/L   Brain natriuretic peptide    Collection Time: 12/19/24  5:33 AM   Result Value Ref Range     (H) 0 - 99 pg/mL   Troponin I    Collection Time: 12/19/24  5:33 AM   Result Value Ref Range    Troponin I 0.118 (H) 0.000 - 0.026 ng/mL   Protime-INR    Collection Time: 12/19/24  5:33 AM   Result Value Ref Range    Prothrombin Time 11.1 9.0 - 12.5 sec    INR 1.0 0.8 - 1.2   Influenza A & B by Molecular    Collection Time: 12/19/24  6:38 AM    Specimen: Nasopharyngeal Swab   Result Value Ref Range    Influenza A, Molecular Negative Negative    Influenza B, Molecular Negative Negative    Flu A & B Source Nasal swab    COVID-19 Rapid Screening    Collection Time: 12/19/24  6:38 AM   Result Value Ref Range    SARS-CoV-2 RNA, Amplification, Qual Negative Negative   Blood culture x two cultures. Draw prior to antibiotics.    Collection Time: 12/19/24  7:50 AM    Specimen: Peripheral, Antecubital, Right; Blood   Result Value Ref Range    Blood Culture, Routine No Growth to date    Blood culture x two cultures. Draw prior to antibiotics.    Collection Time: 12/19/24  8:10 AM    Specimen: Peripheral, Antecubital, Right; Blood   Result Value Ref Range    Blood Culture, Routine No Growth to date    Lactic acid, plasma #1    Collection Time: 12/19/24  8:10 AM   Result Value Ref Range    Lactate (Lactic Acid) 1.4 0.5 - 2.2 mmol/L   Procalcitonin     Collection Time: 12/19/24  8:10 AM   Result Value Ref Range    Procalcitonin 0.10 <0.25 ng/mL   POCT glucose    Collection Time: 12/19/24 10:06 AM   Result Value Ref Range    POCT Glucose 106 70 - 110 mg/dL   Lactic acid, plasma #2    Collection Time: 12/19/24 10:55 AM   Result Value Ref Range    Lactate (Lactic Acid) 1.2 0.5 - 2.2 mmol/L   Troponin I    Collection Time: 12/19/24 10:55 AM   Result Value Ref Range    Troponin I 0.103 (H) 0.000 - 0.026 ng/mL   Troponin I    Collection Time: 12/19/24  3:22 PM   Result Value Ref Range    Troponin I 0.112 (H) 0.000 - 0.026 ng/mL   Troponin I    Collection Time: 12/19/24  6:09 PM   Result Value Ref Range    Troponin I 0.112 (H) 0.000 - 0.026 ng/mL   Comprehensive metabolic panel    Collection Time: 12/20/24  4:27 AM   Result Value Ref Range    Sodium 141 136 - 145 mmol/L    Potassium 4.3 3.5 - 5.1 mmol/L    Chloride 111 (H) 95 - 110 mmol/L    CO2 20 (L) 23 - 29 mmol/L    Glucose 78 70 - 110 mg/dL    BUN 23 8 - 23 mg/dL    Creatinine 1.9 (H) 0.5 - 1.4 mg/dL    Calcium 7.9 (L) 8.7 - 10.5 mg/dL    Total Protein 6.2 6.0 - 8.4 g/dL    Albumin 2.8 (L) 3.5 - 5.2 g/dL    Total Bilirubin 0.3 0.1 - 1.0 mg/dL    Alkaline Phosphatase 61 40 - 150 U/L    AST 14 10 - 40 U/L    ALT 14 10 - 44 U/L    eGFR 38 (A) >60 mL/min/1.73 m^2    Anion Gap 10 8 - 16 mmol/L   CBC auto differential    Collection Time: 12/20/24  4:27 AM   Result Value Ref Range    WBC 7.63 3.90 - 12.70 K/uL    RBC 3.58 (L) 4.60 - 6.20 M/uL    Hemoglobin 10.4 (L) 14.0 - 18.0 g/dL    Hematocrit 33.9 (L) 40.0 - 54.0 %    MCV 95 82 - 98 fL    MCH 29.1 27.0 - 31.0 pg    MCHC 30.7 (L) 32.0 - 36.0 g/dL    RDW 15.5 (H) 11.5 - 14.5 %    Platelets 161 150 - 450 K/uL    MPV 10.5 9.2 - 12.9 fL    Immature Granulocytes 0.4 0.0 - 0.5 %    Gran # (ANC) 4.2 1.8 - 7.7 K/uL    Immature Grans (Abs) 0.03 0.00 - 0.04 K/uL    Lymph # 2.6 1.0 - 4.8 K/uL    Mono # 0.7 0.3 - 1.0 K/uL    Eos # 0.1 0.0 - 0.5 K/uL    Baso # 0.02 0.00 - 0.20 K/uL     nRBC 0 0 /100 WBC    Gran % 54.9 38.0 - 73.0 %    Lymph % 33.8 18.0 - 48.0 %    Mono % 9.0 4.0 - 15.0 %    Eosinophil % 1.6 0.0 - 8.0 %    Basophil % 0.3 0.0 - 1.9 %    Differential Method Automated      *Note: Due to a large number of results and/or encounters for the requested time period, some results have not been displayed. A complete set of results can be found in Results Review.        Imaging Results:  Imaging Results              X-Ray Chest AP Portable (Final result)  Result time 12/19/24 07:12:40      Final result by Pedro Luis Liao ([facility]), MD (12/19/24 07:12:40)                   Impression:      Stable chest.  Possible low-grade infiltrates or atelectasis at the lung bases.      Electronically signed by: Pedro Luis Liao MD  Date:    12/19/2024  Time:    07:12               Narrative:    EXAMINATION:  XR CHEST AP PORTABLE    CLINICAL HISTORY:  Chest pain, cp;    COMPARISON:  Chest, 11/14/2024.    FINDINGS:  One view.  Mild cardiomegaly.  Stable mild increased markings at the lung bases could represent low-grade infiltrate or atelectasis.                                       The EKG was ordered, reviewed, and independently interpreted by the ED provider.  Interpretation time: 5:23 AM  Rate: 86 BPM  Rhythm: Sinus rhythm with 1st degree A-V block with premature atrial complexes in a pattern of bigeminy  Interpretation: Right bundle branch block. Left anterior fascicular block. Bifascicular block. No STEMI.             The Emergency Provider reviewed the vital signs and test results, which are outlined above.     ED Discussion       6:00 AM: Dr. Mtz transfers care of patient to Dr. Armenta pending radiology and lab results.     10:17 AM: Re-evaluated pt.     10:56 AM: Discussed case with Dr. Denis Lockhart ([facility]). Dr. Denis Lockhart agrees with current care and management of pt and accepts admission.   Admitting Service: Hospital Medicine  Admitting Physician: Dr. Denis Lockhart  Admit to: Med/tele  inpt    10:57 AM: Re-evaluated pt. I have discussed test results, shared treatment plan, and the need for admission with patient and family at bedside. Pt and family express understanding at this time and agree with all information. All questions answered. Pt and family have no further questions or concerns at this time. Pt is ready for admit.      ED Course as of 12/20/24 1505   Thu Dec 19, 2024   0613 TTE 11/14/24    ·  Left Ventricle: The left ventricle is normal in size. There is moderate concentric hypertrophy. There is low normal systolic function with a visually estimated ejection fraction of 50 - 55%. There is normal diastolic function.  ·  Right Ventricle: Normal right ventricular cavity size. Systolic function is normal.  ·  Left Atrium: Left atrium is mildly dilated.  ·  Aortic Valve: There is mild stenosis. Aortic valve area by VTI is 1.9 cm². Aortic valve peak velocity is 1.6 m/s. Mean gradient is 5.9 mmHg. The dimensionless index is 0.55.  ·  Mitral Valve: There is mild bileaflet sclerosis. There is mild posterior mitral annular calcification present. There is mild regurgitation.  ·  IVC/SVC: Normal venous pressure at 3 mmHg.   [NF]   1026 Stress test 8/2022  ·  Abnormal myocardial perfusion scan.  ·  There is a moderate intensity, small to moderate sized, fixed perfusion abnormality consistent with scar in the apical, anteroapical and inferoapical wall(s).  ·  There are no other significant perfusion abnormalities.  ·  The gated perfusion images showed an ejection fraction of 57% at rest. The gated perfusion images showed an ejection fraction of 57% post stress.  ·  There is normal wall motion at rest and post stress.  ·  The EKG portion of this study is negative for ischemia.  ·  The patient reported no chest pain during the stress test.  ·  There were no arrhythmias during stress.   [NF]   1034 78-year-old male who presents with chest pain.  His blood pressure was as low as 71/48 but responded to  sepsis fluids.  Blood cultures obtained.  Treated with IV cefepime.  He had an abnormal stress test in 2022.  I do not see evidence of an left heart catheterization.  TTE last month showed an EF of 50-55%.  EKG shows first-degree AV block with PACs and bigeminy as well as right bundle-branch block, left anterior fascicular block, and left axis deviation.  Troponin chronically elevated.  Chest x-ray shows bilateral low-grade infiltrates in the lower lobes, possibly sepsis from pneumonia.  COVID and flu were negative.  .  Patient also has an JOSEM ARIA with a baseline creatinine of 1.1.  No fever or leukocytosis. [NF]      ED Course User Index  [NF] Shawna Armenta, DO     Medical Decision Making  Amount and/or Complexity of Data Reviewed  Labs: ordered. Decision-making details documented in ED Course.  Radiology: ordered. Decision-making details documented in ED Course.  ECG/medicine tests: ordered and independent interpretation performed. Decision-making details documented in ED Course.    Risk  Prescription drug management.  Decision regarding hospitalization.    Critical Care  Total time providing critical care: 45 minutes       Additional MDM:   Differential Diagnosis:   Sepsis, ACS, JOSE MARIA             ED Medication(s):  Medications   methocarbamoL tablet 750 mg (has no administration in time range)   traZODone tablet 200 mg (200 mg Oral Given 12/19/24 2046)   topiramate tablet 25 mg (25 mg Oral Given 12/20/24 0948)   sertraline tablet 100 mg (100 mg Oral Given 12/19/24 2046)   ranolazine 12 hr tablet 1,000 mg (1,000 mg Oral Given 12/20/24 0949)   pantoprazole EC tablet 40 mg (40 mg Oral Given 12/20/24 0949)   lisinopriL tablet 30 mg (30 mg Oral Given 12/20/24 0948)   latanoprost 0.005 % ophthalmic solution 1 drop (1 drop Both Eyes Given 12/19/24 2047)   isosorbide mononitrate 24 hr tablet 30 mg (30 mg Oral Given 12/20/24 0949)   apixaban tablet 5 mg (5 mg Oral Given 12/20/24 0948)   atorvastatin tablet 40 mg (40  mg Oral Given 12/19/24 2046)   amLODIPine tablet 2.5 mg (2.5 mg Oral Given 12/20/24 0949)   allopurinoL tablet 100 mg (100 mg Oral Given 12/20/24 0948)   furosemide tablet 40 mg (40 mg Oral Given 12/20/24 0948)   sodium chloride 0.9% flush 10 mL (has no administration in time range)   ondansetron injection 4 mg (has no administration in time range)   acetaminophen tablet 650 mg (has no administration in time range)   miconazole NITRATE 2 % top powder ( Topical (Top) Given 12/20/24 1023)   tamsulosin 24 hr capsule 0.4 mg (0.4 mg Oral Given 12/20/24 1023)   gabapentin capsule 300 mg (has no administration in time range)   0.9% NaCl infusion (0 mLs Intravenous Stopped 12/19/24 0745)   sodium chloride 0.9% bolus 500 mL 500 mL (0 mLs Intravenous Stopped 12/19/24 0745)   sodium chloride 0.9% bolus 1,345 mL 1,345 mL (0 mLs Intravenous Stopped 12/19/24 0936)   ceFEPIme injection 2 g (2 g Intravenous Given 12/19/24 0922)   doxycycline 100 mg in D5W 100 mL IVPB (MB+) (0 mg Intravenous Stopped 12/19/24 1212)       Current Discharge Medication List        START taking these medications    Details   gabapentin (NEURONTIN) 300 MG capsule Take 1 capsule (300 mg total) by mouth 3 (three) times daily.  Qty: 90 capsule, Refills: 11      tamsulosin (FLOMAX) 0.4 mg Cap Take 1 capsule (0.4 mg total) by mouth once daily.  Qty: 30 capsule, Refills: 11              Follow-up Information       Cm Polanco MD Follow up in 1 week(s).    Specialty: Family Medicine  Contact information:  [address]  [place] [address] [phone]                                 Scribe Attestation:   Scribe #1: I performed the above scribed service and the documentation accurately describes the services I performed. I attest to the accuracy of the note.     Attending:   Physician Attestation Statement for Scribe #1: I, Marina Mtz MD, personally performed the services described in this documentation, as scribed by Dewey Herr, in my  presence, and it is both accurate and complete.       Scribe Attestation:   Scribe #2: I performed the above scribed service and the documentation accurately describes the services I performed. I attest to the accuracy of the note.    Attending Attestation:           Physician Attestation for Scribe:    Physician Attestation Statement for Scribe #2: I, Shawna Armenta MD, reviewed documentation, as scribed by Rito Villeda in my presence, and it is both accurate and complete. I also acknowledge and confirm the content of the note done by Scribe #1.           Clinical Impression       ICD-10-CM ICD-9-CM   1. Sepsis with acute renal failure and septic shock, due to unspecified organism, unspecified acute renal failure type  A41.9 038.9    R65.21 995.92    N17.9 785.52     584.9   2. Chest pain  R07.9 786.50   3. HNP (herniated nucleus pulposus), lumbar  M51.26 722.10   4. Type II diabetes mellitus with neurological manifestations  E11.49 250.60   5. Chronic diastolic congestive heart failure  I50.32 428.32     428.0   6. Pneumonia of lower lobe due to infectious organism, unspecified laterality  J18.9 486       Disposition:   Disposition: Admitted  Condition: Shawna Hernandez,   12/20/24 1509

## 2024-12-20 ENCOUNTER — DOCUMENT SCAN (OUTPATIENT)
Dept: HOME HEALTH SERVICES | Facility: HOSPITAL | Age: 69
End: 2024-12-20
Payer: MEDICARE

## 2024-12-20 ENCOUNTER — TELEPHONE (OUTPATIENT)
Dept: CARDIOLOGY | Facility: CLINIC | Age: 69
End: 2024-12-20
Payer: MEDICARE

## 2024-12-20 VITALS
HEART RATE: 74 BPM | SYSTOLIC BLOOD PRESSURE: 133 MMHG | HEIGHT: 65 IN | WEIGHT: 179.88 LBS | RESPIRATION RATE: 16 BRPM | BODY MASS INDEX: 29.97 KG/M2 | OXYGEN SATURATION: 95 % | TEMPERATURE: 99 F | DIASTOLIC BLOOD PRESSURE: 77 MMHG

## 2024-12-20 LAB
ALBUMIN SERPL BCP-MCNC: 2.8 G/DL (ref 3.5–5.2)
ALP SERPL-CCNC: 61 U/L (ref 40–150)
ALT SERPL W/O P-5'-P-CCNC: 14 U/L (ref 10–44)
ANION GAP SERPL CALC-SCNC: 10 MMOL/L (ref 8–16)
AST SERPL-CCNC: 14 U/L (ref 10–40)
BASOPHILS # BLD AUTO: 0.02 K/UL (ref 0–0.2)
BASOPHILS NFR BLD: 0.3 % (ref 0–1.9)
BILIRUB SERPL-MCNC: 0.3 MG/DL (ref 0.1–1)
BUN SERPL-MCNC: 23 MG/DL (ref 8–23)
CALCIUM SERPL-MCNC: 7.9 MG/DL (ref 8.7–10.5)
CHLORIDE SERPL-SCNC: 111 MMOL/L (ref 95–110)
CO2 SERPL-SCNC: 20 MMOL/L (ref 23–29)
CREAT SERPL-MCNC: 1.9 MG/DL (ref 0.5–1.4)
DIFFERENTIAL METHOD BLD: ABNORMAL
EOSINOPHIL # BLD AUTO: 0.1 K/UL (ref 0–0.5)
EOSINOPHIL NFR BLD: 1.6 % (ref 0–8)
ERYTHROCYTE [DISTWIDTH] IN BLOOD BY AUTOMATED COUNT: 15.5 % (ref 11.5–14.5)
EST. GFR  (NO RACE VARIABLE): 38 ML/MIN/1.73 M^2
GLUCOSE SERPL-MCNC: 78 MG/DL (ref 70–110)
HCT VFR BLD AUTO: 33.9 % (ref 40–54)
HGB BLD-MCNC: 10.4 G/DL (ref 14–18)
IMM GRANULOCYTES # BLD AUTO: 0.03 K/UL (ref 0–0.04)
IMM GRANULOCYTES NFR BLD AUTO: 0.4 % (ref 0–0.5)
LYMPHOCYTES # BLD AUTO: 2.6 K/UL (ref 1–4.8)
LYMPHOCYTES NFR BLD: 33.8 % (ref 18–48)
MCH RBC QN AUTO: 29.1 PG (ref 27–31)
MCHC RBC AUTO-ENTMCNC: 30.7 G/DL (ref 32–36)
MCV RBC AUTO: 95 FL (ref 82–98)
MONOCYTES # BLD AUTO: 0.7 K/UL (ref 0.3–1)
MONOCYTES NFR BLD: 9 % (ref 4–15)
NEUTROPHILS # BLD AUTO: 4.2 K/UL (ref 1.8–7.7)
NEUTROPHILS NFR BLD: 54.9 % (ref 38–73)
NRBC BLD-RTO: 0 /100 WBC
PLATELET # BLD AUTO: 161 K/UL (ref 150–450)
PMV BLD AUTO: 10.5 FL (ref 9.2–12.9)
POTASSIUM SERPL-SCNC: 4.3 MMOL/L (ref 3.5–5.1)
PROT SERPL-MCNC: 6.2 G/DL (ref 6–8.4)
RBC # BLD AUTO: 3.58 M/UL (ref 4.6–6.2)
SODIUM SERPL-SCNC: 141 MMOL/L (ref 136–145)
WBC # BLD AUTO: 7.63 K/UL (ref 3.9–12.7)

## 2024-12-20 PROCEDURE — 25000003 PHARM REV CODE 250: Performed by: NURSE PRACTITIONER

## 2024-12-20 PROCEDURE — 85025 COMPLETE CBC W/AUTO DIFF WBC: CPT | Performed by: NURSE PRACTITIONER

## 2024-12-20 PROCEDURE — 80053 COMPREHEN METABOLIC PANEL: CPT | Performed by: NURSE PRACTITIONER

## 2024-12-20 PROCEDURE — 99232 SBSQ HOSP IP/OBS MODERATE 35: CPT | Mod: ,,, | Performed by: INTERNAL MEDICINE

## 2024-12-20 PROCEDURE — 51798 US URINE CAPACITY MEASURE: CPT

## 2024-12-20 PROCEDURE — 36415 COLL VENOUS BLD VENIPUNCTURE: CPT | Performed by: NURSE PRACTITIONER

## 2024-12-20 PROCEDURE — 25000003 PHARM REV CODE 250: Performed by: FAMILY MEDICINE

## 2024-12-20 RX ORDER — MICONAZOLE NITRATE 2 G/100G
POWDER TOPICAL 2 TIMES DAILY
Status: DISCONTINUED | OUTPATIENT
Start: 2024-12-20 | End: 2024-12-20 | Stop reason: HOSPADM

## 2024-12-20 RX ORDER — GABAPENTIN 300 MG/1
300 CAPSULE ORAL 3 TIMES DAILY
Qty: 90 CAPSULE | Refills: 11 | Status: SHIPPED | OUTPATIENT
Start: 2024-12-20 | End: 2025-12-20

## 2024-12-20 RX ORDER — TAMSULOSIN HYDROCHLORIDE 0.4 MG/1
0.4 CAPSULE ORAL DAILY
Status: DISCONTINUED | OUTPATIENT
Start: 2024-12-20 | End: 2024-12-20 | Stop reason: HOSPADM

## 2024-12-20 RX ORDER — TAMSULOSIN HYDROCHLORIDE 0.4 MG/1
0.4 CAPSULE ORAL DAILY
Qty: 30 CAPSULE | Refills: 11 | Status: SHIPPED | OUTPATIENT
Start: 2024-12-21 | End: 2025-12-21

## 2024-12-20 RX ORDER — GABAPENTIN 300 MG/1
300 CAPSULE ORAL 3 TIMES DAILY
Status: DISCONTINUED | OUTPATIENT
Start: 2024-12-20 | End: 2024-12-20 | Stop reason: HOSPADM

## 2024-12-20 RX ADMIN — FUROSEMIDE 40 MG: 40 TABLET ORAL at 09:12

## 2024-12-20 RX ADMIN — APIXABAN 5 MG: 2.5 TABLET, FILM COATED ORAL at 09:12

## 2024-12-20 RX ADMIN — RANOLAZINE 1000 MG: 500 TABLET, FILM COATED, EXTENDED RELEASE ORAL at 09:12

## 2024-12-20 RX ADMIN — TOPIRAMATE 25 MG: 25 TABLET, FILM COATED ORAL at 09:12

## 2024-12-20 RX ADMIN — ALLOPURINOL 100 MG: 100 TABLET ORAL at 09:12

## 2024-12-20 RX ADMIN — PANTOPRAZOLE SODIUM 40 MG: 40 TABLET, DELAYED RELEASE ORAL at 09:12

## 2024-12-20 RX ADMIN — ISOSORBIDE MONONITRATE 30 MG: 30 TABLET, EXTENDED RELEASE ORAL at 09:12

## 2024-12-20 RX ADMIN — TAMSULOSIN HYDROCHLORIDE 0.4 MG: 0.4 CAPSULE ORAL at 10:12

## 2024-12-20 RX ADMIN — AMLODIPINE BESYLATE 2.5 MG: 2.5 TABLET ORAL at 09:12

## 2024-12-20 RX ADMIN — LISINOPRIL 30 MG: 20 TABLET ORAL at 09:12

## 2024-12-20 RX ADMIN — GABAPENTIN 300 MG: 300 CAPSULE ORAL at 04:12

## 2024-12-20 RX ADMIN — MICONAZOLE NITRATE: 20 POWDER TOPICAL at 10:12

## 2024-12-20 RX ADMIN — GABAPENTIN 800 MG: 400 CAPSULE ORAL at 09:12

## 2024-12-20 NOTE — CONSULTS
[facility] ([facility]  Cardiology  Consult Note    Patient Name: Crow Green  MRN: [id number]  Admission Date: 12/19/2024  Hospital Length of Stay: 0 days  Code Status: Full Code   Attending Provider: Denis Lockhart MD   Consulting Provider: Eduard Zhao MD  Primary Care Physician: Cm Polanco MD  Principal Problem:Chest pain    Patient information was obtained from patient and ER records.     Inpatient consult to Cardiology  Consult performed by: Eduard Zhao MD  Consult ordered by: Shawna Veras FNP        Subjective:       HPI:          presents to the Emergency Department for evaluation of L sided chest pain which onset around 4:00-5:00 pm upon waking up this morning. Pt states his pain has slightly relieved since arrival. Symptoms are now intermittent and 5/10 in severity. Pt now c/o of pain around his eyes. No mitigating or exacerbating factors reported. Associated sxs include dizziness. Patient denies any wounds, active bleeding, and all other sxs at this time. Pt is compliant with his medication. Prior Tx includes Aspirin via EMS en route. No further complaints or concerns at this time       CARDIOLOGY CONSULT FOR CHEST PAIN  PMH CAD nonobstructive and small vessel Dz per [facility] done in 11/16, PAF s/p PVI in  by Dr. Brown, CHFpEF 45%, DM, hTN, HLD and obesity. Asthma and SANDRITA on CPAP f/u with Dr. Alicea.   Bnp 101  TROPONIN 0.103, CHRONICALLY ELEVATED  cR 2.2 hgb 12.4  POSITIVE TENDERNESS ON LOWER STERNAL AREA    Past Medical History:   Diagnosis Date    Arthritis     Asthma     Atrial fibrillation     Atrial fibrillation with RVR 08/07/2019    Carotid artery stenosis     CHF (congestive heart failure)     Chronic obstructive pulmonary disease 08/05/2020    Depression     Dermatomyositis     Diabetes mellitus, type 2 Diagnosed in 2000    Elevated coronary artery calcium score 02/14/2024    Elevated PSA     Follow up 07/30/2020    Hypertension     Myocardial infarction     2017    Sleep  apnea        Past Surgical History:   Procedure Laterality Date    ABLATION OF ARRHYTHMOGENIC FOCUS FOR ATRIAL FIBRILLATION N/A 2/27/2020    Procedure: Ablation atrial fibrillation;  Surgeon: Gio Brown MD;  Location: [facility];  Service: Cardiology;  Laterality: N/A;  afib, DAMIEN (cx if SR), PVI, PITO, anes, MB, 3 Prep    CHOLECYSTECTOMY      CLOSURE OF WOUND Left 7/1/2020    Procedure: CLOSURE, WOUND;  Surgeon: Barb Veras DPM;  Location: [facility] OR;  Service: Podiatry;  Laterality: Left;    COLONOSCOPY N/A 3/4/2022    Procedure: COLONOSCOPY;  Surgeon: Yolis Freitas MD;  Location: [facility] ENDO;  Service: Endoscopy;  Laterality: N/A;    ESOPHAGOGASTRODUODENOSCOPY N/A 3/4/2022    Procedure: EGD (ESOPHAGOGASTRODUODENOSCOPY);  Surgeon: Yolis Freitas MD;  Location: [facility] ENDO;  Service: Endoscopy;  Laterality: N/A;    INJECTION OF ANESTHETIC AGENT INTO SACROILIAC JOINT Left 3/24/2021    Procedure: Left BLOCK, SACROILIAC JOINT and bilateral rhomboid TPI;  Surgeon: Dillan Tsai MD;  Location: [facility] PAIN MGT;  Service: Pain Management;  Laterality: Left;    INTRALUMINAL GASTROINTESTINAL TRACT IMAGING VIA CAPSULE N/A 3/22/2022    Procedure: IMAGING PROCEDURE, GI TRACT, INTRALUMINAL, VIA CAPSULE;  Surgeon: Justice Martinez RN;  Location: [facility] ENDO;  Service: Endoscopy;  Laterality: N/A;    REVERSE TOTAL SHOULDER ARTHROPLASTY Left 1/10/2022    Procedure: ARTHROPLASTY, SHOULDER, TOTAL, REVERSE;  Surgeon: Anthony Alvarado MD;  Location: [facility];  Service: Orthopedics;  Laterality: Left;  left reverse total shoulder arthroplasty     SELECTIVE INJECTION OF ANESTHETIC AGENT AROUND LUMBAR SPINAL NERVE ROOT BY TRANSFORAMINAL APPROACH Left 6/11/2020    Procedure: BLOCK, SPINAL NERVE ROOT, LUMBAR, SELECTIVE, TRANSFORAMINAL APPROACH Left L4-5, L5-S1 TFESI with RN IV sedation;  Surgeon: Dillan Tsai MD;  Location: [facility]IN [facility]GT;  Service: Pain Management;  Laterality: Left;    TOE AMPUTATION Left 6/29/2020     Procedure: AMPUTATION, TOE;  Surgeon: Barb Veras DPM;  Location: [facility];  Service: Podiatry;  Laterality: Left;  great toe       Review of patient's allergies indicates:   Allergen Reactions    Protamine Hives     Urticaria, possible upper airway swelling       No current facility-administered medications on file prior to encounter.     Current Outpatient Medications on File Prior to Encounter   Medication Sig    albuterol (PROVENTIL) 2.5 mg /3 mL (0.083 %) nebulizer solution INHALE THE CONTENTS OF 1 VIAL VIA NEBULIZER EVERY 4 TO 6 HOURS AS NEEDED FOR WHEEZING OR FOR SHORTNESS OF BREATH    albuterol sulfate 90 mcg/actuation aebs Inhale 2 puffs into the lungs every 4 (four) hours as needed (WHEEZING OR SHORTNESS OF BREATH).    allopurinoL (ZYLOPRIM) 100 MG tablet TAKE 1 TABLET ONE TIME DAILY    amLODIPine (NORVASC) 2.5 MG tablet Take 1 tablet (2.5 mg total) by mouth once daily.    atorvastatin (LIPITOR) 40 MG tablet Take 1 tablet (40 mg total) by mouth every evening.    blood sugar diagnostic (TRUE METRIX GLUCOSE TEST STRIP) Strp Inject 1 each into the skin 4 (four) times daily.    blood-glucose meter Misc Use as instructed to test blood glucose meter daily.    brinzolamide-brimonidine (SIMBRINZA) 1-0.2 % DrpS Place 1 drop into both eyes 3 (three) times daily.    cyanocobalamin (VITAMIN B-12) 1000 MCG tablet Take 1 tablet by mouth once daily.    DEXCOM G7 SENSOR Shefali Change sensor every 10 days    ELIQUIS 5 mg Tab TAKE 1 TABLET TWICE DAILY    empagliflozin (JARDIANCE) 25 mg tablet Take 1 tablet (25 mg total) by mouth once daily.    furosemide (LASIX) 40 MG tablet Take 40 mg by mouth 2 (two) times daily. 2.5 tablets twice daily    gabapentin (NEURONTIN) 800 MG tablet Take 1 tablet (800 mg total) by mouth 3 (three) times daily.    INV allopurinol tablet Take 1 tablet by mouth once daily.    isosorbide mononitrate (IMDUR) 30 MG 24 hr tablet Take 1 tablet (30 mg total) by mouth once daily.    lancets (TRUEPLUS  "LANCETS) 33 gauge Misc Use to check blood sugar twice a day    lancets (TRUEPLUS LANCETS) 33 gauge Misc USE AS DIRECTED TO TEST BLOOD SUGAR FOUR TIMES DAILY    latanoprost 0.005 % ophthalmic solution Place 1 drop into both eyes every evening.    lisinopriL (PRINIVIL,ZESTRIL) 30 MG tablet TAKE 1 TABLET EVERY DAY.    methocarbamoL (ROBAXIN) 750 MG Tab Take 750 mg by mouth 2 (two) times daily as needed.    multivitamin-iron-folic acid (SENTRY) Tab Take 1 tablet by mouth once daily.    omalizumab (XOLAIR) 150 mg/mL injection Inject 2 mLs (300 mg total) into the skin every 14 (fourteen) days.    omalizumab (XOLAIR) 75 mg/0.5 mL injection Inject 0.5 mLs (75 mg total) into the skin every 14 (fourteen) days.    pantoprazole (PROTONIX) 40 MG tablet Take 1 tablet (40 mg total) by mouth once daily.    pen needle, diabetic (BD ULTRA-FINE SHORT PEN NEEDLE) 31 gauge x 5/16" Ndle 1 each by Misc.(Non-Drug; Combo Route) route 3 (three) times daily.    pep injection Inject 0.15 ml as directed     For compounding pharmacy use:   Add PAPAVERINE 30 mg  Add PHENTOLAMINE 1 mg  Add ALPROSTADIL 20 mcg    ranolazine (RANEXA) 1,000 mg Tb12 TAKE 1 TABLET TWICE DAILY    semaglutide (OZEMPIC) 2 mg/dose (8 mg/3 mL) PnIj Inject 2 mg into the skin every 7 days.    sertraline (ZOLOFT) 100 MG tablet Take 1 tablet (100 mg total) by mouth every evening.    topiramate (TOPAMAX) 25 MG tablet Take 1 tablet (25 mg total) by mouth 2 (two) times daily.    traMADoL (ULTRAM) 50 mg tablet Take 50 mg by mouth every 6 (six) hours as needed.    traZODone (DESYREL) 100 MG tablet TAKE 2 TABLETS EVERY EVENING    VIOS AEROSOL DELIVERY SYSTEM Shefali USE AS DIRESTED    EPINEPHrine (EPIPEN) 0.3 mg/0.3 mL AtIn Inject 0.3 mLs (0.3 mg total) into the muscle once. for 1 dose    [DISCONTINUED] diclofenac sodium (VOLTAREN) 1 % Gel APPLY 2 GRAMS TOPICALLY FOUR TIMES DAILY (Patient not taking: Reported on 10/11/2024)     Family History       Problem Relation (Age of Onset)    " Cancer Maternal Grandfather    Cataracts Mother, Father, Sister    Diabetes Mother, Sister, Maternal Aunt    Glaucoma Mother, Sister, Maternal Aunt    Hypertension Mother    No Known Problems Brother, Daughter, Son    Stroke Father, Sister          Tobacco Use    Smoking status: Never    Smokeless tobacco: Never   Substance and Sexual Activity    Alcohol use: Yes     Comment: rare, maybe holidays    Drug use: Not Currently     Types: Cocaine    Sexual activity: Not Currently     Partners: Female     ROS  Objective:     Vital Signs (Most Recent):  Temp: 98 °F (36.7 °C) (12/19/24 1641)  Pulse: 78 (12/19/24 1641)  Resp: 20 (12/19/24 1641)  BP: 118/68 (12/19/24 1641)  SpO2: 96 % (12/19/24 1641) Vital Signs (24h Range):  Temp:  [98 °F (36.7 °C)-98.5 °F (36.9 °C)] 98 °F (36.7 °C)  Pulse:  [74-86] 78  Resp:  [15-34] 20  SpO2:  [95 %-100 %] 96 %  BP: ()/(46-79) 118/68     Weight: 81.6 kg (180 lb)  Body mass index is 29.95 kg/m².    SpO2: 96 %         Intake/Output Summary (Last 24 hours) at 12/19/2024 1827  Last data filed at 12/19/2024 1212  Gross per 24 hour   Intake 1445 ml   Output --   Net 1445 ml       Lines/Drains/Airways       Peripheral Intravenous Line  Duration                  Peripheral IV - Single Lumen 12/19/24 0531 20 G Right Antecubital <1 day         Peripheral IV - Single Lumen 12/19/24 0532 18 G Left Antecubital <1 day         Peripheral IV - Single Lumen 12/19/24 0810 22 G Right;Medial Antecubital <1 day                     Physical Exam  HENT:      Head: Normocephalic.   Eyes:      Pupils: Pupils are equal, round, and reactive to light.   Neck:      Thyroid: No thyromegaly.      Vascular: Normal carotid pulses. No carotid bruit or JVD.   Cardiovascular:      Rate and Rhythm: Normal rate and regular rhythm. No extrasystoles are present.     Chest Wall: PMI is not displaced.      Pulses: Normal pulses.      Heart sounds: Normal heart sounds. No murmur heard.     No gallop. No S3 sounds.  "  Pulmonary:      Effort: No respiratory distress.      Breath sounds: Normal breath sounds. No stridor.   Chest:      Chest wall: Tenderness (ON LOWER STERNAL AREA) present.   Abdominal:      General: Bowel sounds are normal.      Palpations: Abdomen is soft.      Tenderness: There is no abdominal tenderness. There is no rebound.   Musculoskeletal:         General: Normal range of motion.   Skin:     Findings: No rash.   Neurological:      Mental Status: He is alert and oriented to person, place, and time.   Psychiatric:         Behavior: Behavior normal.          Significant Labs: ABG: No results for input(s): "PH", "PCO2", "HCO3", "POCSATURATED", "BE" in the last 48 hours., Blood Culture: No results for input(s): "LABBLOO" in the last 48 hours., BMP:   Recent Labs   Lab 12/19/24  0533   *      K 4.5      CO2 19*   BUN 24*   CREATININE 2.2*   CALCIUM 8.7   , CMP   Recent Labs   Lab 12/19/24  0533      K 4.5      CO2 19*   *   BUN 24*   CREATININE 2.2*   CALCIUM 8.7   PROT 7.6   ALBUMIN 3.2*   BILITOT 0.3   ALKPHOS 74   AST 28   ALT 22   ANIONGAP 13   , CBC   Recent Labs   Lab 12/19/24  0533   WBC 8.84   HGB 12.4*   HCT 39.7*      , INR   Recent Labs   Lab 12/19/24  0533   INR 1.0   , Lipid Panel No results for input(s): "CHOL", "HDL", "LDLCALC", "TRIG", "CHOLHDL" in the last 48 hours., and Troponin   Recent Labs   Lab 12/19/24  0533 12/19/24  1055 12/19/24  1522   TROPONINI 0.118* 0.103* 0.112*       Significant Imaging: EKG:    Assessment and Plan:     * Chest pain  CHEST PAIN WITH POSITIVE TENDERNESS ON LOWER STERNAL AREA. NON ISCHEMIA PAIN   CHRONIC ELEVATION OF TROPONIN FLAT  CAD   H/O COCAINE ABUSE    CONTINUE HOME MED  TRENDING TROPONIN     Elevated troponin  CHRONIC ELEVATION      PAF (paroxysmal atrial fibrillation)  CONTINUE ELIQUIS     Chronic combined systolic and diastolic heart failure  COMPENSATED    CONTINUE LASIX ACEI  ADD BB DUE TO H/O COCAINE " ABUSE    Essential hypertension  RESUME HOME MED        VTE Risk Mitigation (From admission, onward)           Ordered     apixaban tablet 5 mg  2 times daily         12/19/24 1451     IP VTE HIGH RISK PATIENT  Once         12/19/24 1451     Place sequential compression device  Until discontinued         12/19/24 1451                    Thank you for your consult. I will follow-up with patient. Please contact us if you have any additional questions.    Eduard Zhao MD  Cardiology   [facility] - Telemetry ([facility])

## 2024-12-20 NOTE — DISCHARGE SUMMARY
"[facility] Medicine  Discharge Summary      Patient Name: Crow Green  MRN: [id number]  TONY: [id number]  Patient Class: IP- Inpatient  Admission Date: 12/19/2024  Hospital Length of Stay: 1 days  Discharge Date and Time:  12/20/2024 2:35 PM  Attending Physician: Denis Lockhart MD   Discharging Provider: Denis Lockhart MD  Primary Care Provider: Cm Polanco MD    Primary Care Team: Networked reference to record PCT     HPI:   Crow Green is a 69 year old male who has  has a past medical history of Arthritis, Asthma, Atrial fibrillation, Atrial fibrillation with RVR, Carotid artery stenosis, CHF (congestive heart failure), Chronic obstructive pulmonary disease, Depression, Dermatomyositis, Diabetes mellitus, type 2, Elevated coronary artery calcium score, Elevated PSA, Follow up, Hypertension, Myocardial infarction, and Sleep apnea who presented to the Emergency Department for evaluation of chest pain. Onset at 4AM this morning. Pain located to left chest without radiation. Associated symptoms include SOB. He denies abdominal pain, N/V. Described pain as "ache." He recently had cataract surgery with Dr. Mitchell and states has not had any of his medications for 2 days due to "migraines." He currently denies migraine and/or headache. He is followed by Dr. Zhao outpatient. ED workup showed creatinine 2.2, , troponin 0.118, CXR stable chest. Possible low-grade infiltrates or atelectasis at the lung bases. Patient treated per protocol in ED for bed bugs which he was not aware he had. BP low upon arrival to ED. His blood pressure was as low as 71/48 but responded to sepsis fluids. Blood cultures obtained. Treated with a dose IV cefepime. He had an abnormal stress test in 08/2022. Previous TTE on 11/14/24 showed an EF of 50-55%, normal diastolic function. Pt admitted for chest pain.    * No surgery found *      Hospital Course:   Patient was admitted for chest pain.  Cardiology " consult on case.  He was started on empiric cefepime due to initial concerns with sepsis.  However patient was afebrile and WBC was in normal limits.  No source of infection suspected.  Cefepime was discontinued.  Patient does have a history of medication noncompliance along with cocaine abuse.  Counseled on cessation and medication compliance.  He was discharged home.     Goals of Care Treatment Preferences:  Code Status: Full Code         Consults:   Consults (From admission, onward)          Status Ordering Provider     Inpatient consult to Cardiology  Once        Provider:  Eduard Zhao MD    Completed TRINA ANDERSON            * Chest pain  -Initial troponin 0.118  -Troponin chronically elevated but appears lower than baseline  -Will consult Cardiology  -Serial troponin pending  -CXR showed stable chest but possible low-grade infiltrates or atelectasis at the lung bases  -WBC count normal, procalcitonin and lactic acid both normal  -He did receive a one time dose of Cefepime in the ED  -Will hold off on resuming abx at this time as does not appear to be PNA or infectious process  -He does have hx of drug abuse, cocaine. UDS pending  -Also patient reports being off all medications for the past 2 days due to recent cataract surgery      Type 2 diabetes mellitus  Hemoglobin A1C   Date Value Ref Range Status   11/08/2024 5.7 (H) 4.0 - 5.6 % Final     Comment:     ADA Screening Guidelines:  5.7-6.4%  Consistent with prediabetes  >or=6.5%  Consistent with diabetes    High levels of fetal hemoglobin interfere with the HbA1C  assay. Heterozygous hemoglobin variants (HbS, HgC, etc)do  not significantly interfere with this assay.   However, presence of multiple variants may affect accuracy.     04/10/2024 5.7 (H) 4.0 - 5.6 % Final     Comment:     ADA Screening Guidelines:  5.7-6.4%  Consistent with prediabetes  >or=6.5%  Consistent with diabetes    High levels of fetal hemoglobin interfere with the HbA1C  assay.  Heterozygous hemoglobin variants (HbS, HgC, etc)do  not significantly interfere with this assay.   However, presence of multiple variants may affect accuracy.     09/29/2023 6.4 (H) 4.0 - 5.6 % Final     Comment:     ADA Screening Guidelines:  5.7-6.4%  Consistent with prediabetes  >or=6.5%  Consistent with diabetes    High levels of fetal hemoglobin interfere with the HbA1C  assay. Heterozygous hemoglobin variants (HbS, HgC, etc)do  not significantly interfere with this assay.   However, presence of multiple variants may affect accuracy.        -DM appears controlled  -Monitor glucose with AM lab draws    Elevated troponin  -Initial troponin 0.118  -Appears to have chronically elevated troponin  -Serial troponin pending  -Will consult Cardiology  -Continue Ranexa, Imdur, Statin, and CCB      CKD (chronic kidney disease)  Creatine stable for now. BMP reviewed- noted Estimated Creatinine Clearance: 32 mL/min (A) (based on SCr of 2.2 mg/dL (H)). according to latest data. Based on current GFR, CKD stage is stage 3 - GFR 30-59.  Monitor UOP and serial BMP and adjust therapy as needed. Renally dose meds. Avoid nephrotoxic medications and procedures.  -Baseline creatinine appears to be around 2    PAF (paroxysmal atrial fibrillation)  Patient has paroxysmal (<7 days) atrial fibrillation. Patient is currently in sinus rhythm. [id number] Score: 3. The patients heart rate in the last 24 hours is as follows:  Pulse  Min: 74  Max: 86     Antiarrhythmics       Anticoagulants  apixaban tablet 5 mg, 2 times daily, Oral    Plan  - Replete lytes with a goal of K>4, Mg >2  - Patient is anticoagulated, see medications listed above.  - Patient's afib is currently controlled        Essential hypertension  Patient's blood pressure range in the last 24 hours was: BP  Min: 71/48  Max: 136/79.The patient's inpatient anti-hypertensive regimen is listed below:  Current Antihypertensives  ranolazine 12 hr tablet 1,000 mg, 2 times daily,  Oral  lisinopriL tablet 30 mg, Daily, Oral  isosorbide mononitrate 24 hr tablet 30 mg, Daily, Oral  amLODIPine tablet 2.5 mg, Daily, Oral  furosemide tablet 40 mg, 2 times daily, Oral    Plan  - BP is controlled, no changes needed to their regimen      Final Active Diagnoses:    Diagnosis Date Noted POA    PRINCIPAL PROBLEM:  Chest pain [R07.9] 03/15/2017 Yes    Chronic diastolic congestive heart failure [I50.32] 12/19/2024 Yes    Type 2 diabetes mellitus [E11.9] 11/13/2024 Yes    Elevated troponin [R79.89] 06/26/2020 Yes    CKD (chronic kidney disease) [N18.9] 04/29/2020 Yes    PAF (paroxysmal atrial fibrillation) [I48.0] 03/17/2017 Yes     Chronic    Chronic combined systolic and diastolic heart failure [I50.42] 12/05/2016 Yes    Essential hypertension [I10] 02/27/2015 Yes      Problems Resolved During this Admission:       Discharged Condition: good    Disposition: Home or Self Care    Follow Up:   Follow-up Information       Cm Polanco MD Follow up in 1 week(s).    Specialty: Family Medicine  Contact information:  [address]  [place] [address] [phone]                           Patient Instructions:      SUBSEQUENT HOME HEALTH ORDERS     Order Specific Question Answer Comments   What Home Health Agency is the patient currently using? [facility]        Significant Diagnostic Studies: N/A    Pending Diagnostic Studies:       None           Medications:  Reconciled Home Medications:      Medication List        START taking these medications      gabapentin 300 MG capsule  Commonly known as: NEURONTIN  Take 1 capsule (300 mg total) by mouth 3 (three) times daily.  Replaces: gabapentin 800 MG tablet     tamsulosin 0.4 mg Cap  Commonly known as: FLOMAX  Take 1 capsule (0.4 mg total) by mouth once daily.  Start taking on: December 21, 2024            CONTINUE taking these medications      * albuterol sulfate 90 mcg/actuation Aebs  Inhale 2 puffs into the lungs every 4 (four) hours as  needed (WHEEZING OR SHORTNESS OF BREATH).     * albuterol 2.5 mg /3 mL (0.083 %) nebulizer solution  Commonly known as: PROVENTIL  INHALE THE CONTENTS OF 1 VIAL VIA NEBULIZER EVERY 4 TO 6 HOURS AS NEEDED FOR WHEEZING OR FOR SHORTNESS OF BREATH     allopurinoL 100 MG tablet  Commonly known as: ZYLOPRIM  TAKE 1 TABLET ONE TIME DAILY     amLODIPine 2.5 MG tablet  Commonly known as: NORVASC  Take 1 tablet (2.5 mg total) by mouth once daily.     atorvastatin 40 MG tablet  Commonly known as: LIPITOR  Take 1 tablet (40 mg total) by mouth every evening.     blood sugar diagnostic Strp  Commonly known as: TRUE METRIX GLUCOSE TEST STRIP  Inject 1 each into the skin 4 (four) times daily.     blood-glucose meter Misc  Use as instructed to test blood glucose meter daily.     cyanocobalamin 1000 MCG tablet  Commonly known as: VITAMIN B-12  Take 1 tablet by mouth once daily.     DEXCOM G7 SENSOR Shefali  Generic drug: blood-glucose sensor  Change sensor every 10 days     ELIQUIS 5 mg Tab  Generic drug: apixaban  TAKE 1 TABLET TWICE DAILY     empagliflozin 25 mg tablet  Commonly known as: JARDIANCE  Take 1 tablet (25 mg total) by mouth once daily.     EPINEPHrine 0.3 mg/0.3 mL Atin  Commonly known as: EPIPEN  Inject 0.3 mLs (0.3 mg total) into the muscle once. for 1 dose     furosemide 40 MG tablet  Commonly known as: LASIX  Take 40 mg by mouth 2 (two) times daily. 2.5 tablets twice daily     INV allopurinol tablet  Take 1 tablet by mouth once daily.     isosorbide mononitrate 30 MG 24 hr tablet  Commonly known as: IMDUR  Take 1 tablet (30 mg total) by mouth once daily.     latanoprost 0.005 % ophthalmic solution  Place 1 drop into both eyes every evening.     lisinopriL 30 MG tablet  Commonly known as: PRINIVIL,ZESTRIL  TAKE 1 TABLET EVERY DAY.     methocarbamoL 750 MG Tab  Commonly known as: ROBAXIN  Take 750 mg by mouth 2 (two) times daily as needed.     OZEMPIC 2 mg/dose (8 mg/3 mL) Pnij  Generic drug: semaglutide  Inject 2 mg  "into the skin every 7 days.     pantoprazole 40 MG tablet  Commonly known as: PROTONIX  Take 1 tablet (40 mg total) by mouth once daily.     pen needle, diabetic 31 gauge x 5/16" Ndle  Commonly known as: BD ULTRA-FINE SHORT PEN NEEDLE  1 each by Misc.(Non-Drug; Combo Route) route 3 (three) times daily.     PEP INJECTION (FOR RX USE)  Inject 0.15 ml as directed     For compounding pharmacy use:   Add PAPAVERINE 30 mg  Add PHENTOLAMINE 1 mg  Add ALPROSTADIL 20 mcg     ranolazine 1,000 mg Tb12  Commonly known as: RANEXA  TAKE 1 TABLET TWICE DAILY     SENTRY Tab  Generic drug: multivitamin-iron-folic acid  Take 1 tablet by mouth once daily.     sertraline 100 MG tablet  Commonly known as: ZOLOFT  Take 1 tablet (100 mg total) by mouth every evening.     SIMBRINZA 1-0.2 % Drps  Generic drug: brinzolamide-brimonidine  Place 1 drop into both eyes 3 (three) times daily.     topiramate 25 MG tablet  Commonly known as: TOPAMAX  Take 1 tablet (25 mg total) by mouth 2 (two) times daily.     traMADoL 50 mg tablet  Commonly known as: ULTRAM  Take 50 mg by mouth every 6 (six) hours as needed.     traZODone 100 MG tablet  Commonly known as: DESYREL  TAKE 2 TABLETS EVERY EVENING     * TRUEPLUS LANCETS 33 gauge Misc  Generic drug: lancets  Use to check blood sugar twice a day     * TRUEPLUS LANCETS 33 gauge Misc  Generic drug: lancets  USE AS DIRECTED TO TEST BLOOD SUGAR FOUR TIMES DAILY     VIOS AEROSOL DELIVERY SYSTEM Shefali  Generic drug: nebulizer and compressor  USE AS DIRESTED     * XOLAIR 75 mg/0.5 mL injection  Generic drug: omalizumab  Inject 0.5 mLs (75 mg total) into the skin every 14 (fourteen) days.     * XOLAIR 150 mg/mL injection  Generic drug: omalizumab  Inject 2 mLs (300 mg total) into the skin every 14 (fourteen) days.           * This list has 6 medication(s) that are the same as other medications prescribed for you. Read the directions carefully, and ask your doctor or other care provider to review them with you.    "             STOP taking these medications      gabapentin 800 MG tablet  Commonly known as: NEURONTIN  Replaced by: gabapentin 300 MG capsule              Indwelling Lines/Drains at time of discharge:   Lines/Drains/Airways       None                   Time spent on the discharge of patient: 36 minutes         Denis Lockhart MD  Department of Hospital Medicine  [facility] Tele[facility] ([facility])

## 2024-12-20 NOTE — HOSPITAL COURSE
Patient was admitted for chest pain.  Cardiology consult on case.  He was started on empiric cefepime due to initial concerns with sepsis.  However patient was afebrile and WBC was in normal limits.  No source of infection suspected.  Cefepime was discontinued.  Patient does have a history of medication noncompliance along with cocaine abuse.  Counseled on cessation and medication compliance.  He was discharged home.

## 2024-12-20 NOTE — ASSESSMENT & PLAN NOTE
-Initial troponin 0.118  -Troponin chronically elevated but appears lower than baseline  -Will consult Cardiology  -Serial troponin pending  -CXR showed stable chest but possible low-grade infiltrates or atelectasis at the lung bases  -WBC count normal, procalcitonin and lactic acid both normal  -He did receive a one time dose of Cefepime in the ED  -Will hold off on resuming abx at this time as does not appear to be PNA or infectious process  -He does have hx of drug abuse, cocaine. UDS pending  -Also patient reports being off all medications for the past 2 days due to recent cataract surgery

## 2024-12-20 NOTE — PLAN OF CARE
[facility] - Tele[facility] (Hospital)  Initial Discharge Assessment       Primary Care Provider: Cm Polanco MD    Admission Diagnosis: Chest pain [R07.9]    Admission Date: 12/19/2024  Expected Discharge Date: 12/20/2024    Transition of Care Barriers: None    Payor: HU[employer] MANAGED MEDICARE / Plan: HU[employer] SNP HMO PPO SPECIAL NEEDS / Product Type: Medicare Advantage /     Extended Emergency Contact Information  Primary Emergency Contact: Gretchen Green  Mobile Phone: [phone]  Relation: Sister  Secondary Emergency Contact: Erica Green  Address: [address]            [place] [address] [place]  Mobile Phone: [phone]  Relation: Relative    Discharge Plan A: [facility] - [facility] [address]  [address] [facility] [address]  Phone: [phone] Fax: [phone]    [id number] - [place] - [address]  [address] [place] [address]  Phone: [phone] Fax: [phone]      Initial Assessment (most recent)       Adult Discharge Assessment - 12/20/24 1310          Discharge Assessment    Assessment Type Discharge Planning Assessment     Confirmed/corrected address, phone number and insurance Yes     Confirmed Demographics Correct on Facesheet     Source of Information patient     Communicated REJI with patient/caregiver Date not available/Unable to determine     Reason For Admission chest pain     People in Home parent(s)     Facility Arrived From: home     Do you expect to return to your current living situation? Yes     Do you have help at home or someone to help you manage your care at home? Yes     Prior to hospitilization cognitive status: Alert/Oriented     Current cognitive status: Alert/Oriented     Readmission within 30 days? No     Patient currently being followed by outpatient case management? No     Do you currently have service(s) that  help you manage your care at home? Yes     Name and Contact number of agency [facility]     Is the pt/caregiver preference to resume services with current agency Yes     Do you take prescription medications? Yes     Do you have prescription coverage? Yes     Do you have any problems affording any of your prescribed medications? TBD     Is the patient taking medications as prescribed? yes     Who is going to help you get home at discharge? family     How do you get to doctors appointments? family or friend will provide     Are you on dialysis? No     Discharge Plan A Home Health     DME Needed Upon Discharge  none     Discharge Plan discussed with: Patient     Transition of Care Barriers None                   Anticipated DC Dispo: home with resumption of [facility]  Prior Level of Function: independent per patient  PCP: Cm Polanco MD  Comments:  CM met with patient at bedside to introduce role and discuss d/c planning. Assessment limited due to drowsiness. Patient confirmed he is current with [facility] and would like to resume care. CM following.

## 2024-12-20 NOTE — H&P
"[facility] Medicine  History & Physical    Patient Name: Crow Green  MRN: [id number]  Patient Class: IP- Inpatient  Admission Date: 12/19/2024  Attending Physician: Denis Lockhart MD   Primary Care Provider: Cm Polanco MD         Patient information was obtained from patient and ER records.     Subjective:     Principal Problem:Chest pain    Chief Complaint:   Chief Complaint   Patient presents with    Chest Pain     Patient complaining of chest burning/pain that woke him up from his sleep. Patient started complaining of dizziness while in ambulance.        HPI: Crow Green is a 69 year old male who has  has a past medical history of Arthritis, Asthma, Atrial fibrillation, Atrial fibrillation with RVR, Carotid artery stenosis, CHF (congestive heart failure), Chronic obstructive pulmonary disease, Depression, Dermatomyositis, Diabetes mellitus, type 2, Elevated coronary artery calcium score, Elevated PSA, Follow up, Hypertension, Myocardial infarction, and Sleep apnea who presented to the Emergency Department for evaluation of chest pain. Onset at 4AM this morning. Pain located to left chest without radiation. Associated symptoms include SOB. He denies abdominal pain, N/V. Described pain as "ache." He recently had cataract surgery with Dr. Mitchell and states has not had any of his medications for 2 days due to "migraines." He currently denies migraine and/or headache. He is followed by Dr. Zhao outpatient. ED workup showed creatinine 2.2, , troponin 0.118, CXR stable chest. Possible low-grade infiltrates or atelectasis at the lung bases. Patient treated per protocol in ED for bed bugs which he was not aware he had. BP low upon arrival to ED. His blood pressure was as low as 71/48 but responded to sepsis fluids. Blood cultures obtained. Treated with a dose IV cefepime. He had an abnormal stress test in 08/2022. Previous TTE on 11/14/24 showed an EF of 50-55%, " normal diastolic function. Pt admitted for chest pain.    Past Medical History:   Diagnosis Date    Arthritis     Asthma     Atrial fibrillation     Atrial fibrillation with RVR 08/07/2019    Carotid artery stenosis     CHF (congestive heart failure)     Chronic obstructive pulmonary disease 08/05/2020    Depression     Dermatomyositis     Diabetes mellitus, type 2 Diagnosed in 2000    Elevated coronary artery calcium score 02/14/2024    Elevated PSA     Follow up 07/30/2020    Hypertension     Myocardial infarction     2017    Sleep apnea        Past Surgical History:   Procedure Laterality Date    ABLATION OF ARRHYTHMOGENIC FOCUS FOR ATRIAL FIBRILLATION N/A 2/27/2020    Procedure: Ablation atrial fibrillation;  Surgeon: Gio Brown MD;  Location: [facility] EP LAB;  Service: Cardiology;  Laterality: N/A;  afib, DAMIEN (cx if SR), PVI, PITO, anes, MB, 3 Prep    CHOLECYSTECTOMY      CLOSURE OF WOUND Left 7/1/2020    Procedure: CLOSURE, WOUND;  Surgeon: Barb Veras DPM;  Location: [facility] OR;  Service: Podiatry;  Laterality: Left;    COLONOSCOPY N/A 3/4/2022    Procedure: COLONOSCOPY;  Surgeon: Yolis Freitas MD;  Location: [facility] ENDO;  Service: Endoscopy;  Laterality: N/A;    ESOPHAGOGASTRODUODENOSCOPY N/A 3/4/2022    Procedure: EGD (ESOPHAGOGASTRODUODENOSCOPY);  Surgeon: Yolis Freitas MD;  Location: [facility] ENDO;  Service: Endoscopy;  Laterality: N/A;    INJECTION OF ANESTHETIC AGENT INTO SACROILIAC JOINT Left 3/24/2021    Procedure: Left BLOCK, SACROILIAC JOINT and bilateral rhomboid TPI;  Surgeon: Dillan Tsai MD;  Location: [facility] PAIN MGT;  Service: Pain Management;  Laterality: Left;    INTRALUMINAL GASTROINTESTINAL TRACT IMAGING VIA CAPSULE N/A 3/22/2022    Procedure: IMAGING PROCEDURE, GI TRACT, INTRALUMINAL, VIA CAPSULE;  Surgeon: Justice Martinez RN;  Location: [facility] ENDO;  Service: Endoscopy;  Laterality: N/A;    REVERSE TOTAL SHOULDER ARTHROPLASTY Left 1/10/2022    Procedure: ARTHROPLASTY, SHOULDER,  TOTAL, REVERSE;  Surgeon: Anthony Alvarado MD;  Location: [facility] OR;  Service: Orthopedics;  Laterality: Left;  left reverse total shoulder arthroplasty     SELECTIVE INJECTION OF ANESTHETIC AGENT AROUND LUMBAR SPINAL NERVE ROOT BY TRANSFORAMINAL APPROACH Left 6/11/2020    Procedure: BLOCK, SPINAL NERVE ROOT, LUMBAR, SELECTIVE, TRANSFORAMINAL APPROACH Left L4-5, L5-S1 TFESI with RN IV sedation;  Surgeon: Dillan Tsai MD;  [facility];  Service: Pain Management;  Laterality: Left;    TOE AMPUTATION Left 6/29/2020    Procedure: AMPUTATION, TOE;  Surgeon: Barb Veras DPM;  Location: [facility] OR;  Service: Podiatry;  Laterality: Left;  great toe       Review of patient's allergies indicates:   Allergen Reactions    Protamine Hives     Urticaria, possible upper airway swelling       No current facility-administered medications on file prior to encounter.     Current Outpatient Medications on File Prior to Encounter   Medication Sig    albuterol (PROVENTIL) 2.5 mg /3 mL (0.083 %) nebulizer solution INHALE THE CONTENTS OF 1 VIAL VIA NEBULIZER EVERY 4 TO 6 HOURS AS NEEDED FOR WHEEZING OR FOR SHORTNESS OF BREATH    albuterol sulfate 90 mcg/actuation aebs Inhale 2 puffs into the lungs every 4 (four) hours as needed (WHEEZING OR SHORTNESS OF BREATH).    allopurinoL (ZYLOPRIM) 100 MG tablet TAKE 1 TABLET ONE TIME DAILY    amLODIPine (NORVASC) 2.5 MG tablet Take 1 tablet (2.5 mg total) by mouth once daily.    atorvastatin (LIPITOR) 40 MG tablet Take 1 tablet (40 mg total) by mouth every evening.    blood sugar diagnostic (TRUE METRIX GLUCOSE TEST STRIP) Strp Inject 1 each into the skin 4 (four) times daily.    blood-glucose meter Misc Use as instructed to test blood glucose meter daily.    brinzolamide-brimonidine (SIMBRINZA) 1-0.2 % DrpS Place 1 drop into both eyes 3 (three) times daily.    cyanocobalamin (VITAMIN B-12) 1000 MCG tablet Take 1 tablet by mouth once daily.    DEXCOM G7 SENSOR Shefali Change sensor  "every 10 days    ELIQUIS 5 mg Tab TAKE 1 TABLET TWICE DAILY    empagliflozin (JARDIANCE) 25 mg tablet Take 1 tablet (25 mg total) by mouth once daily.    furosemide (LASIX) 40 MG tablet Take 40 mg by mouth 2 (two) times daily. 2.5 tablets twice daily    gabapentin (NEURONTIN) 800 MG tablet Take 1 tablet (800 mg total) by mouth 3 (three) times daily.    INV allopurinol tablet Take 1 tablet by mouth once daily.    isosorbide mononitrate (IMDUR) 30 MG 24 hr tablet Take 1 tablet (30 mg total) by mouth once daily.    lancets (TRUEPLUS LANCETS) 33 gauge Misc Use to check blood sugar twice a day    lancets (TRUEPLUS LANCETS) 33 gauge Misc USE AS DIRECTED TO TEST BLOOD SUGAR FOUR TIMES DAILY    latanoprost 0.005 % ophthalmic solution Place 1 drop into both eyes every evening.    lisinopriL (PRINIVIL,ZESTRIL) 30 MG tablet TAKE 1 TABLET EVERY DAY.    methocarbamoL (ROBAXIN) 750 MG Tab Take 750 mg by mouth 2 (two) times daily as needed.    multivitamin-iron-folic acid (SENTRY) Tab Take 1 tablet by mouth once daily.    omalizumab (XOLAIR) 150 mg/mL injection Inject 2 mLs (300 mg total) into the skin every 14 (fourteen) days.    omalizumab (XOLAIR) 75 mg/0.5 mL injection Inject 0.5 mLs (75 mg total) into the skin every 14 (fourteen) days.    pantoprazole (PROTONIX) 40 MG tablet Take 1 tablet (40 mg total) by mouth once daily.    pen needle, diabetic (BD ULTRA-FINE SHORT PEN NEEDLE) 31 gauge x 5/16" Ndle 1 each by Misc.(Non-Drug; Combo Route) route 3 (three) times daily.    pep injection Inject 0.15 ml as directed     For compounding pharmacy use:   Add PAPAVERINE 30 mg  Add PHENTOLAMINE 1 mg  Add ALPROSTADIL 20 mcg    ranolazine (RANEXA) 1,000 mg Tb12 TAKE 1 TABLET TWICE DAILY    semaglutide (OZEMPIC) 2 mg/dose (8 mg/3 mL) PnIj Inject 2 mg into the skin every 7 days.    sertraline (ZOLOFT) 100 MG tablet Take 1 tablet (100 mg total) by mouth every evening.    topiramate (TOPAMAX) 25 MG tablet Take 1 tablet (25 mg total) by mouth " 2 (two) times daily.    traMADoL (ULTRAM) 50 mg tablet Take 50 mg by mouth every 6 (six) hours as needed.    traZODone (DESYREL) 100 MG tablet TAKE 2 TABLETS EVERY EVENING    VIOS AEROSOL DELIVERY SYSTEM Shefali USE AS DIRESTED    EPINEPHrine (EPIPEN) 0.3 mg/0.3 mL AtIn Inject 0.3 mLs (0.3 mg total) into the muscle once. for 1 dose    [DISCONTINUED] diclofenac sodium (VOLTAREN) 1 % Gel APPLY 2 GRAMS TOPICALLY FOUR TIMES DAILY (Patient not taking: Reported on 10/11/2024)     Family History       Problem Relation (Age of Onset)    Cancer Maternal Grandfather    Cataracts Mother, Father, Sister    Diabetes Mother, Sister, Maternal Aunt    Glaucoma Mother, Sister, Maternal Aunt    Hypertension Mother    No Known Problems Brother, Daughter, Son    Stroke Father, Sister          Tobacco Use    Smoking status: Never    Smokeless tobacco: Never   Substance and Sexual Activity    Alcohol use: Yes     Comment: rare, maybe holidays    Drug use: Not Currently     Types: Cocaine    Sexual activity: Not Currently     Partners: Female     Review of Systems   Constitutional:  Positive for activity change. Negative for chills and fever.   HENT:  Negative for trouble swallowing.    Eyes:  Negative for visual disturbance.   Respiratory:  Positive for shortness of breath. Negative for cough, choking, chest tightness and wheezing.    Cardiovascular:  Positive for chest pain.   Gastrointestinal:  Negative for abdominal pain, constipation, diarrhea, nausea and vomiting.   Genitourinary:  Negative for difficulty urinating.   Musculoskeletal:  Negative for arthralgias.   Neurological:  Negative for tremors, seizures, syncope, facial asymmetry, speech difficulty, light-headedness, numbness and headaches.   Psychiatric/Behavioral:  Negative for agitation, behavioral problems and confusion.      Objective:     Vital Signs (Most Recent):  Temp: 98 °F (36.7 °C) (12/19/24 1641)  Pulse: 78 (12/19/24 1641)  Resp: 20 (12/19/24 1641)  BP: 118/68  (12/19/24 1641)  SpO2: 96 % (12/19/24 1641) Vital Signs (24h Range):  Temp:  [98 °F (36.7 °C)-98.5 °F (36.9 °C)] 98 °F (36.7 °C)  Pulse:  [74-86] 78  Resp:  [15-34] 20  SpO2:  [95 %-100 %] 96 %  BP: ()/(46-79) 118/68     Weight: 81.6 kg (180 lb)  Body mass index is 29.95 kg/m².     Physical Exam  Vitals reviewed.   Constitutional:       General: He is not in acute distress.     Appearance: He is ill-appearing.   HENT:      Head: Normocephalic.   Eyes:      Extraocular Movements: Extraocular movements intact.   Cardiovascular:      Rate and Rhythm: Normal rate.      Pulses: Normal pulses.   Pulmonary:      Effort: Pulmonary effort is normal. No respiratory distress.   Abdominal:      General: Bowel sounds are normal. There is no distension.      Palpations: Abdomen is soft.      Tenderness: There is no abdominal tenderness.   Genitourinary:     Comments: deferrred  Musculoskeletal:         General: Normal range of motion.      Cervical back: Neck supple.   Skin:     General: Skin is warm and dry.   Neurological:      Mental Status: He is alert and oriented to person, place, and time.   Psychiatric:         Mood and Affect: Mood normal.         Behavior: Behavior normal.         Thought Content: Thought content normal.                Significant Labs: All pertinent labs within the past 24 hours have been reviewed.  CBC:   Recent Labs   Lab 12/19/24  0533   WBC 8.84   HGB 12.4*   HCT 39.7*        CMP:   Recent Labs   Lab 12/19/24  0533      K 4.5      CO2 19*   *   BUN 24*   CREATININE 2.2*   CALCIUM 8.7   PROT 7.6   ALBUMIN 3.2*   BILITOT 0.3   ALKPHOS 74   AST 28   ALT 22   ANIONGAP 13     Lactic Acid:   Recent Labs   Lab 12/19/24  0810 12/19/24  1055   LACTATE 1.4 1.2     Troponin:   Recent Labs   Lab 12/19/24  0533 12/19/24  1055 12/19/24  1522   TROPONINI 0.118* 0.103* 0.112*       Significant Imaging:   Imaging Results              X-Ray Chest AP Portable (Final result)  Result  time 12/19/24 07:12:40      Final result by Pedro Luis Liao MD (Timothy) (12/19/24 07:12:40)                   Impression:      Stable chest.  Possible low-grade infiltrates or atelectasis at the lung bases.      Electronically signed by: Pedro Luis Liao MD  Date:    12/19/2024  Time:    07:12               Narrative:    EXAMINATION:  XR CHEST AP PORTABLE    CLINICAL HISTORY:  Chest pain, cp;    COMPARISON:  Chest, 11/14/2024.    FINDINGS:  One view.  Mild cardiomegaly.  Stable mild increased markings at the lung bases could represent low-grade infiltrate or atelectasis.                                     Assessment/Plan:     * Chest pain  -Initial troponin 0.118  -Troponin chronically elevated but appears lower than baseline  -Will consult Cardiology  -Serial troponin pending  -CXR showed stable chest but possible low-grade infiltrates or atelectasis at the lung bases  -WBC count normal, procalcitonin and lactic acid both normal  -He did receive a one time dose of Cefepime in the ED  -Will hold off on resuming abx at this time as does not appear to be PNA or infectious process  -He does have hx of drug abuse, cocaine. UDS pending  -Also patient reports being off all medications for the past 2 days due to recent cataract surgery      Type 2 diabetes mellitus  Hemoglobin A1C   Date Value Ref Range Status   11/08/2024 5.7 (H) 4.0 - 5.6 % Final     Comment:     ADA Screening Guidelines:  5.7-6.4%  Consistent with prediabetes  >or=6.5%  Consistent with diabetes    High levels of fetal hemoglobin interfere with the HbA1C  assay. Heterozygous hemoglobin variants (HbS, HgC, etc)do  not significantly interfere with this assay.   However, presence of multiple variants may affect accuracy.     04/10/2024 5.7 (H) 4.0 - 5.6 % Final     Comment:     ADA Screening Guidelines:  5.7-6.4%  Consistent with prediabetes  >or=6.5%  Consistent with diabetes    High levels of fetal hemoglobin interfere with the HbA1C  assay.  Heterozygous hemoglobin variants (HbS, HgC, etc)do  not significantly interfere with this assay.   However, presence of multiple variants may affect accuracy.     09/29/2023 6.4 (H) 4.0 - 5.6 % Final     Comment:     ADA Screening Guidelines:  5.7-6.4%  Consistent with prediabetes  >or=6.5%  Consistent with diabetes    High levels of fetal hemoglobin interfere with the HbA1C  assay. Heterozygous hemoglobin variants (HbS, HgC, etc)do  not significantly interfere with this assay.   However, presence of multiple variants may affect accuracy.        -DM appears controlled  -Monitor glucose with AM lab draws    Elevated troponin  -Initial troponin 0.118  -Appears to have chronically elevated troponin  -Serial troponin pending  -Will consult Cardiology  -Continue Ranexa, Imdur, Statin, and CCB      CKD (chronic kidney disease)  Creatine stable for now. BMP reviewed- noted Estimated Creatinine Clearance: 32 mL/min (A) (based on SCr of 2.2 mg/dL (H)). according to latest data. Based on current GFR, CKD stage is stage 3 - GFR 30-59.  Monitor UOP and serial BMP and adjust therapy as needed. Renally dose meds. Avoid nephrotoxic medications and procedures.  -Baseline creatinine appears to be around 2    PAF (paroxysmal atrial fibrillation)  Patient has paroxysmal (<7 days) atrial fibrillation. Patient is currently in sinus rhythm. [id number] Score: 3. The patients heart rate in the last 24 hours is as follows:  Pulse  Min: 74  Max: 86     Antiarrhythmics       Anticoagulants  apixaban tablet 5 mg, 2 times daily, Oral    Plan  - Replete lytes with a goal of K>4, Mg >2  - Patient is anticoagulated, see medications listed above.  - Patient's afib is currently controlled        Essential hypertension  Patient's blood pressure range in the last 24 hours was: BP  Min: 71/48  Max: 136/79.The patient's inpatient anti-hypertensive regimen is listed below:  Current Antihypertensives  ranolazine 12 hr tablet 1,000 mg, 2 times daily,  Oral  lisinopriL tablet 30 mg, Daily, Oral  isosorbide mononitrate 24 hr tablet 30 mg, Daily, Oral  amLODIPine tablet 2.5 mg, Daily, Oral  furosemide tablet 40 mg, 2 times daily, Oral    Plan  - BP is controlled, no changes needed to their regimen      VTE Risk Mitigation (From admission, onward)           Ordered     apixaban tablet 5 mg  2 times daily         12/19/24 1451     IP VTE HIGH RISK PATIENT  Once         12/19/24 1451     Place sequential compression device  Until discontinued         12/19/24 1451                                  ROMIE Rosenbaum  Department of Hospital Medicine  [facility] - Telemetry ([facility])

## 2024-12-20 NOTE — PLAN OF CARE
Problem: Adult Inpatient Plan of Care  Goal: Plan of Care Review  Outcome: Adequate for Care Transition  Goal: Patient-Specific Goal (Individualized)  Outcome: Adequate for Care Transition  Goal: Absence of Hospital-Acquired Illness or Injury  Outcome: Adequate for Care Transition  Goal: Optimal Comfort and Wellbeing  Outcome: Adequate for Care Transition  Goal: Readiness for Transition of Care  Outcome: Adequate for Care Transition     Problem: Diabetes Comorbidity  Goal: Blood Glucose Level Within Targeted Range  Outcome: Adequate for Care Transition     Problem: Acute Kidney Injury/Impairment  Goal: Fluid and Electrolyte Balance  Outcome: Adequate for Care Transition  Goal: Improved Oral Intake  Outcome: Adequate for Care Transition  Goal: Effective Renal Function  Outcome: Adequate for Care Transition

## 2024-12-20 NOTE — ASSESSMENT & PLAN NOTE
CHEST PAIN WITH POSITIVE TENDERNESS ON LOWER STERNAL AREA. NON ISCHEMIA PAIN   CHRONIC ELEVATION OF TROPONIN FLAT  CAD   H/O COCAINE ABUSE    CONTINUE HOME MED  TRENDING TROPONIN     12/20/24  -Troponin flat, 0.118>0.103>0.112  -CP atypical with tenderness on exam  -Can f/u as OP

## 2024-12-20 NOTE — ASSESSMENT & PLAN NOTE
Patient's blood pressure range in the last 24 hours was: BP  Min: 71/48  Max: 136/79.The patient's inpatient anti-hypertensive regimen is listed below:  Current Antihypertensives  ranolazine 12 hr tablet 1,000 mg, 2 times daily, Oral  lisinopriL tablet 30 mg, Daily, Oral  isosorbide mononitrate 24 hr tablet 30 mg, Daily, Oral  amLODIPine tablet 2.5 mg, Daily, Oral  furosemide tablet 40 mg, 2 times daily, Oral    Plan  - BP is controlled, no changes needed to their regimen

## 2024-12-20 NOTE — SUBJECTIVE & OBJECTIVE
Past Medical History:   Diagnosis Date    Arthritis     Asthma     Atrial fibrillation     Atrial fibrillation with RVR 08/07/2019    Carotid artery stenosis     CHF (congestive heart failure)     Chronic obstructive pulmonary disease 08/05/2020    Depression     Dermatomyositis     Diabetes mellitus, type 2 Diagnosed in 2000    Elevated coronary artery calcium score 02/14/2024    Elevated PSA     Follow up 07/30/2020    Hypertension     Myocardial infarction     2017    Sleep apnea        Past Surgical History:   Procedure Laterality Date    ABLATION OF ARRHYTHMOGENIC FOCUS FOR ATRIAL FIBRILLATION N/A 2/27/2020    Procedure: Ablation atrial fibrillation;  Surgeon: Gio Brown MD;  Location: [facility] EP LAB;  Service: Cardiology;  Laterality: N/A;  afib, DAMIEN (cx if SR), PVI, PITO, anes, MB, 3 Prep    CHOLECYSTECTOMY      CLOSURE OF WOUND Left 7/1/2020    Procedure: CLOSURE, WOUND;  Surgeon: Barb Veras DPM;  Location: [facility] OR;  Service: Podiatry;  Laterality: Left;    COLONOSCOPY N/A 3/4/2022    Procedure: COLONOSCOPY;  Surgeon: Yolis Freitas MD;  Location: [facility] ENDO;  Service: Endoscopy;  Laterality: N/A;    ESOPHAGOGASTRODUODENOSCOPY N/A 3/4/2022    Procedure: EGD (ESOPHAGOGASTRODUODENOSCOPY);  Surgeon: Yolis Freitas MD;  Location: [facility] ENDO;  Service: Endoscopy;  Laterality: N/A;    INJECTION OF ANESTHETIC AGENT INTO SACROILIAC JOINT Left 3/24/2021    Procedure: Left BLOCK, SACROILIAC JOINT and bilateral rhomboid TPI;  Surgeon: Dillan Tsai MD;  Location: [facility] PAIN MGT;  Service: Pain Management;  Laterality: Left;    INTRALUMINAL GASTROINTESTINAL TRACT IMAGING VIA CAPSULE N/A 3/22/2022    Procedure: IMAGING PROCEDURE, GI TRACT, INTRALUMINAL, VIA CAPSULE;  Surgeon: Justice Martinez RN;  Location: [facility] ENDO;  Service: Endoscopy;  Laterality: N/A;    REVERSE TOTAL SHOULDER ARTHROPLASTY Left 1/10/2022    Procedure: ARTHROPLASTY, SHOULDER, TOTAL, REVERSE;  Surgeon: Anthony Alvarado MD;   Location: [facility] OR;  Service: Orthopedics;  Laterality: Left;  left reverse total shoulder arthroplasty     SELECTIVE INJECTION OF ANESTHETIC AGENT AROUND LUMBAR SPINAL NERVE ROOT BY TRANSFORAMINAL APPROACH Left 6/11/2020    Procedure: BLOCK, SPINAL NERVE ROOT, LUMBAR, SELECTIVE, TRANSFORAMINAL APPROACH Left L4-5, L5-S1 TFESI with RN IV sedation;  Surgeon: Dillan Tsai MD;  [facility];  Service: Pain Management;  Laterality: Left;    TOE AMPUTATION Left 6/29/2020    Procedure: AMPUTATION, TOE;  Surgeon: Barb Veras DPM;  Location: [facility] OR;  Service: Podiatry;  Laterality: Left;  great toe       Review of patient's allergies indicates:   Allergen Reactions    Protamine Hives     Urticaria, possible upper airway swelling       No current facility-administered medications on file prior to encounter.     Current Outpatient Medications on File Prior to Encounter   Medication Sig    albuterol (PROVENTIL) 2.5 mg /3 mL (0.083 %) nebulizer solution INHALE THE CONTENTS OF 1 VIAL VIA NEBULIZER EVERY 4 TO 6 HOURS AS NEEDED FOR WHEEZING OR FOR SHORTNESS OF BREATH    albuterol sulfate 90 mcg/actuation aebs Inhale 2 puffs into the lungs every 4 (four) hours as needed (WHEEZING OR SHORTNESS OF BREATH).    allopurinoL (ZYLOPRIM) 100 MG tablet TAKE 1 TABLET ONE TIME DAILY    amLODIPine (NORVASC) 2.5 MG tablet Take 1 tablet (2.5 mg total) by mouth once daily.    atorvastatin (LIPITOR) 40 MG tablet Take 1 tablet (40 mg total) by mouth every evening.    blood sugar diagnostic (TRUE METRIX GLUCOSE TEST STRIP) Strp Inject 1 each into the skin 4 (four) times daily.    blood-glucose meter Misc Use as instructed to test blood glucose meter daily.    brinzolamide-brimonidine (SIMBRINZA) 1-0.2 % DrpS Place 1 drop into both eyes 3 (three) times daily.    cyanocobalamin (VITAMIN B-12) 1000 MCG tablet Take 1 tablet by mouth once daily.    DEXCOM G7 SENSOR Shefali Change sensor every 10 days    ELIQUIS 5 mg Tab TAKE 1 TABLET TWICE  "DAILY    empagliflozin (JARDIANCE) 25 mg tablet Take 1 tablet (25 mg total) by mouth once daily.    furosemide (LASIX) 40 MG tablet Take 40 mg by mouth 2 (two) times daily. 2.5 tablets twice daily    gabapentin (NEURONTIN) 800 MG tablet Take 1 tablet (800 mg total) by mouth 3 (three) times daily.    INV allopurinol tablet Take 1 tablet by mouth once daily.    isosorbide mononitrate (IMDUR) 30 MG 24 hr tablet Take 1 tablet (30 mg total) by mouth once daily.    lancets (TRUEPLUS LANCETS) 33 gauge Misc Use to check blood sugar twice a day    lancets (TRUEPLUS LANCETS) 33 gauge Misc USE AS DIRECTED TO TEST BLOOD SUGAR FOUR TIMES DAILY    latanoprost 0.005 % ophthalmic solution Place 1 drop into both eyes every evening.    lisinopriL (PRINIVIL,ZESTRIL) 30 MG tablet TAKE 1 TABLET EVERY DAY.    methocarbamoL (ROBAXIN) 750 MG Tab Take 750 mg by mouth 2 (two) times daily as needed.    multivitamin-iron-folic acid (SENTRY) Tab Take 1 tablet by mouth once daily.    omalizumab (XOLAIR) 150 mg/mL injection Inject 2 mLs (300 mg total) into the skin every 14 (fourteen) days.    omalizumab (XOLAIR) 75 mg/0.5 mL injection Inject 0.5 mLs (75 mg total) into the skin every 14 (fourteen) days.    pantoprazole (PROTONIX) 40 MG tablet Take 1 tablet (40 mg total) by mouth once daily.    pen needle, diabetic (BD ULTRA-FINE SHORT PEN NEEDLE) 31 gauge x 5/16" Ndle 1 each by Misc.(Non-Drug; Combo Route) route 3 (three) times daily.    pep injection Inject 0.15 ml as directed     For compounding pharmacy use:   Add PAPAVERINE 30 mg  Add PHENTOLAMINE 1 mg  Add ALPROSTADIL 20 mcg    ranolazine (RANEXA) 1,000 mg Tb12 TAKE 1 TABLET TWICE DAILY    semaglutide (OZEMPIC) 2 mg/dose (8 mg/3 mL) PnIj Inject 2 mg into the skin every 7 days.    sertraline (ZOLOFT) 100 MG tablet Take 1 tablet (100 mg total) by mouth every evening.    topiramate (TOPAMAX) 25 MG tablet Take 1 tablet (25 mg total) by mouth 2 (two) times daily.    traMADoL (ULTRAM) 50 mg tablet " Take 50 mg by mouth every 6 (six) hours as needed.    traZODone (DESYREL) 100 MG tablet TAKE 2 TABLETS EVERY EVENING    VIOS AEROSOL DELIVERY SYSTEM Shefali USE AS DIRESTED    EPINEPHrine (EPIPEN) 0.3 mg/0.3 mL AtIn Inject 0.3 mLs (0.3 mg total) into the muscle once. for 1 dose    [DISCONTINUED] diclofenac sodium (VOLTAREN) 1 % Gel APPLY 2 GRAMS TOPICALLY FOUR TIMES DAILY (Patient not taking: Reported on 10/11/2024)     Family History       Problem Relation (Age of Onset)    Cancer Maternal Grandfather    Cataracts Mother, Father, Sister    Diabetes Mother, Sister, Maternal Aunt    Glaucoma Mother, Sister, Maternal Aunt    Hypertension Mother    No Known Problems Brother, Daughter, Son    Stroke Father, Sister          Tobacco Use    Smoking status: Never    Smokeless tobacco: Never   Substance and Sexual Activity    Alcohol use: Yes     Comment: rare, maybe holidays    Drug use: Not Currently     Types: Cocaine    Sexual activity: Not Currently     Partners: Female     ROS  Objective:     Vital Signs (Most Recent):  Temp: 98 °F (36.7 °C) (12/19/24 1641)  Pulse: 78 (12/19/24 1641)  Resp: 20 (12/19/24 1641)  BP: 118/68 (12/19/24 1641)  SpO2: 96 % (12/19/24 1641) Vital Signs (24h Range):  Temp:  [98 °F (36.7 °C)-98.5 °F (36.9 °C)] 98 °F (36.7 °C)  Pulse:  [74-86] 78  Resp:  [15-34] 20  SpO2:  [95 %-100 %] 96 %  BP: ()/(46-79) 118/68     Weight: 81.6 kg (180 lb)  Body mass index is 29.95 kg/m².    SpO2: 96 %         Intake/Output Summary (Last 24 hours) at 12/19/2024 1827  Last data filed at 12/19/2024 1212  Gross per 24 hour   Intake 1445 ml   Output --   Net 1445 ml       Lines/Drains/Airways       Peripheral Intravenous Line  Duration                  Peripheral IV - Single Lumen 12/19/24 0531 20 G Right Antecubital <1 day         Peripheral IV - Single Lumen 12/19/24 0532 18 G Left Antecubital <1 day         Peripheral IV - Single Lumen 12/19/24 0810 22 G Right;Medial Antecubital <1 day                    "  Physical Exam  HENT:      Head: Normocephalic.   Eyes:      Pupils: Pupils are equal, round, and reactive to light.   Neck:      Thyroid: No thyromegaly.      Vascular: Normal carotid pulses. No carotid bruit or JVD.   Cardiovascular:      Rate and Rhythm: Normal rate and regular rhythm. No extrasystoles are present.     Chest Wall: PMI is not displaced.      Pulses: Normal pulses.      Heart sounds: Normal heart sounds. No murmur heard.     No gallop. No S3 sounds.   Pulmonary:      Effort: No respiratory distress.      Breath sounds: Normal breath sounds. No stridor.   Chest:      Chest wall: Tenderness (ON LOWER STERNAL AREA) present.   Abdominal:      General: Bowel sounds are normal.      Palpations: Abdomen is soft.      Tenderness: There is no abdominal tenderness. There is no rebound.   Musculoskeletal:         General: Normal range of motion.   Skin:     Findings: No rash.   Neurological:      Mental Status: He is alert and oriented to person, place, and time.   Psychiatric:         Behavior: Behavior normal.          Significant Labs: ABG: No results for input(s): "PH", "PCO2", "HCO3", "POCSATURATED", "BE" in the last 48 hours., Blood Culture: No results for input(s): "LABBLOO" in the last 48 hours., BMP:   Recent Labs   Lab 12/19/24  0533   *      K 4.5      CO2 19*   BUN 24*   CREATININE 2.2*   CALCIUM 8.7   , CMP   Recent Labs   Lab 12/19/24  0533      K 4.5      CO2 19*   *   BUN 24*   CREATININE 2.2*   CALCIUM 8.7   PROT 7.6   ALBUMIN 3.2*   BILITOT 0.3   ALKPHOS 74   AST 28   ALT 22   ANIONGAP 13   , CBC   Recent Labs   Lab 12/19/24  0533   WBC 8.84   HGB 12.4*   HCT 39.7*      , INR   Recent Labs   Lab 12/19/24  0533   INR 1.0   , Lipid Panel No results for input(s): "CHOL", "HDL", "LDLCALC", "TRIG", "CHOLHDL" in the last 48 hours., and Troponin   Recent Labs   Lab 12/19/24  0533 12/19/24  1055 12/19/24  1522   TROPONINI 0.118* 0.103* 0.112* "       Significant Imaging: EKG:

## 2024-12-20 NOTE — SUBJECTIVE & OBJECTIVE
Review of Systems   Constitutional: Positive for malaise/fatigue.   HENT: Negative.     Eyes: Negative.    Cardiovascular:  Positive for chest pain (atypical).   Respiratory: Negative.     Endocrine: Negative.    Hematologic/Lymphatic: Negative.    Skin: Negative.    Musculoskeletal: Negative.    Gastrointestinal: Negative.    Genitourinary: Negative.    Neurological: Negative.    Psychiatric/Behavioral: Negative.     Allergic/Immunologic: Negative.      Objective:     Vital Signs (Most Recent):  Temp: 97.4 °F (36.3 °C) (12/20/24 1158)  Pulse: 80 (12/20/24 1158)  Resp: 18 (12/20/24 1158)  BP: (!) 116/59 (12/20/24 1158)  SpO2: (!) 92 % (12/20/24 1158) Vital Signs (24h Range):  Temp:  [97.4 °F (36.3 °C)-98.5 °F (36.9 °C)] 97.4 °F (36.3 °C)  Pulse:  [72-84] 80  Resp:  [16-20] 18  SpO2:  [92 %-97 %] 92 %  BP: (113-133)/(58-77) 116/59     Weight: 81.6 kg (180 lb)  Body mass index is 29.95 kg/m².     SpO2: (!) 92 %         Intake/Output Summary (Last 24 hours) at 12/20/2024 1426  Last data filed at 12/20/2024 0813  Gross per 24 hour   Intake 360 ml   Output 550 ml   Net -190 ml       Lines/Drains/Airways       Peripheral Intravenous Line  Duration                  Peripheral IV - Single Lumen 12/19/24 0531 20 G Right Antecubital 1 day         Peripheral IV - Single Lumen 12/19/24 0532 18 G Left Antecubital 1 day         Peripheral IV - Single Lumen 12/19/24 0810 22 G Right;Medial Antecubital 1 day                       Physical Exam  Vitals and nursing note reviewed.   Constitutional:       General: He is not in acute distress.     Appearance: Normal appearance. He is well-developed. He is not diaphoretic.   HENT:      Head: Normocephalic and atraumatic.   Eyes:      General:         Right eye: No discharge.         Left eye: No discharge.      Pupils: Pupils are equal, round, and reactive to light.   Cardiovascular:      Rate and Rhythm: Normal rate and regular rhythm.      Heart sounds: Normal heart sounds, S1  normal and S2 normal. No murmur heard.     Comments: +chest soreness  Pulmonary:      Effort: Pulmonary effort is normal.   Musculoskeletal:      Right lower leg: No edema.      Left lower leg: No edema.   Skin:     General: Skin is warm and dry.      Findings: No erythema.   Neurological:      Mental Status: He is alert and oriented to person, place, and time.   Psychiatric:         Mood and Affect: Mood normal.         Behavior: Behavior normal.         Thought Content: Thought content normal.            Significant Labs: CMP   Recent Labs   Lab 12/19/24  0533 12/20/24  0427    141   K 4.5 4.3    111*   CO2 19* 20*   * 78   BUN 24* 23   CREATININE 2.2* 1.9*   CALCIUM 8.7 7.9*   PROT 7.6 6.2   ALBUMIN 3.2* 2.8*   BILITOT 0.3 0.3   ALKPHOS 74 61   AST 28 14   ALT 22 14   ANIONGAP 13 10   , CBC   Recent Labs   Lab 12/19/24  0533 12/20/24  0427   WBC 8.84 7.63   HGB 12.4* 10.4*   HCT 39.7* 33.9*    161   , Troponin   Recent Labs   Lab 12/19/24  1055 12/19/24  1522 12/19/24  1809   TROPONINI 0.103* 0.112* 0.112*   , and All pertinent lab results from the last 24 hours have been reviewed.    Significant Imaging: Echocardiogram: Transthoracic echo (TTE) complete (Cupid Only):   Results for orders placed or performed during the hospital encounter of 11/13/24   Echo   Result Value Ref Range    BSA 1.99 m2    LVOT stroke volume 65.1 cm3    LVIDd 4.5 3.5 - 6.0 cm    LV Systolic Volume 42.67 mL    LV Systolic Volume Index 22.0 mL/m2    LVIDs 3.3 2.1 - 4.0 cm    LV Diastolic Volume 91.14 mL    LV Diastolic Volume Index 46.98 mL/m2    Left Ventricular End Systolic Volume by Teichholz Method 42.67 mL    Left Ventricular End Diastolic Volume by Teichholz Method 91.14 mL    IVS 1.6 (A) 0.6 - 1.1 cm    LVOT diameter 2.1 cm    LVOT area 3.5 cm2    FS 26.7 (A) 28 - 44 %    Left Ventricle Relative Wall Thickness 0.71 cm    PW 1.6 (A) 0.6 - 1.1 cm    LV mass 304.6 g    LV Mass Index 157.0 g/m2    MV Peak E Mathew  1.13 m/s    TDI LATERAL 0.07 m/s    TDI SEPTAL 0.07 m/s    E/E' ratio 16.14 m/s    MV Peak A Mathew 0.75 m/s    E/A ratio 1.51     IVRT 57.09 msec    E wave deceleration time 256.48 msec    LV SEPTAL E/E' RATIO 16.14 m/s    LV LATERAL E/E' RATIO 16.14 m/s    LVOT peak mathew 0.9 m/s    Left Ventricular Outflow Tract Mean Velocity 0.73 cm/s    Left Ventricular Outflow Tract Mean Gradient 2.14 mmHg    RVOT peak VTI 21.3 cm    TAPSE 1.99 cm    LA size 4.37 cm    Left Atrium Minor Axis 5.22 cm    Left Atrium Major Axis 5.25 cm    RA Major Axis 3.57 cm    AV mean gradient 5.9 mmHg    AV peak gradient 10.2 mmHg    Ao peak mathew 1.6 m/s    Ao VTI 33.9 cm    LVOT peak VTI 18.8 cm    AV valve area 1.9 cm²    AV Velocity Ratio 0.56     AV index (prosthetic) 0.55     PRAMOD by Velocity Ratio 1.9 cm²    MV mean gradient 2 mmHg    MV peak gradient 5 mmHg    MV stenosis pressure 1/2 time 74.38 ms    MV valve area p 1/2 method 2.96 cm2    MV valve area by continuity eq 1.97 cm2    MV VTI 33.0 cm    PV mean gradient 3 mmHg    RVOT peak mathew 1.07 m/s    Ao root annulus 3.46 cm    STJ 3.36 cm    Ascending aorta 3.28 cm    IVC diameter 1.77 cm    Mean e' 0.07 m/s    ZLVIDS -0.21     ZLVIDD -2.03     MAYDA 30.1 mL/m2    LA Vol 58.34 cm3    LA WIDTH 3.0 cm    RA Width 2.6 cm    Est. RA pres 3 mmHg    Narrative      Left Ventricle: The left ventricle is normal in size. There is moderate   concentric hypertrophy. There is low normal systolic function with a   visually estimated ejection fraction of 50 - 55%. There is normal   diastolic function.    Right Ventricle: Normal right ventricular cavity size. Systolic   function is normal.    Left Atrium: Left atrium is mildly dilated.    Aortic Valve: There is mild stenosis. Aortic valve area by VTI is 1.9   cm². Aortic valve peak velocity is 1.6 m/s. Mean gradient is 5.9 mmHg. The   dimensionless index is 0.55.    Mitral Valve: There is mild bileaflet sclerosis. There is mild   posterior mitral annular  calcification present. There is mild   regurgitation.    IVC/SVC: Normal venous pressure at 3 mmHg.      and EKG: Reviewed

## 2024-12-20 NOTE — ASSESSMENT & PLAN NOTE
CHEST PAIN WITH POSITIVE TENDERNESS ON LOWER STERNAL AREA. NON ISCHEMIA PAIN   CHRONIC ELEVATION OF TROPONIN FLAT  CAD   H/O COCAINE ABUSE    CONTINUE HOME MED  TRENDING TROPONIN

## 2024-12-20 NOTE — ASSESSMENT & PLAN NOTE
Creatine stable for now. BMP reviewed- noted Estimated Creatinine Clearance: 32 mL/min (A) (based on SCr of 2.2 mg/dL (H)). according to latest data. Based on current GFR, CKD stage is stage 3 - GFR 30-59.  Monitor UOP and serial BMP and adjust therapy as needed. Renally dose meds. Avoid nephrotoxic medications and procedures.  -Baseline creatinine appears to be around 2

## 2024-12-20 NOTE — ASSESSMENT & PLAN NOTE
-Initial troponin 0.118  -Appears to have chronically elevated troponin  -Serial troponin pending  -Will consult Cardiology  -Continue Ranexa, Imdur, Statin, and CCB

## 2024-12-20 NOTE — NURSING TRANSFER
Nursing Transfer Note      12/19/2024   1533 PM    Nurse giving handoff:Isi  Nurse receiving handoff:Peggy MCGUIRE    Reason patient is being transferred: Admission    Transfer From: ED    Transfer via stretcher    Transfer with cardiac monitoring    Transported by Transporter    Transfer Vital Signs:  Blood Pressure:133/77  Heart Rate:74  O2:95  Temperature:98.5  Respirations:16    Telemetry: Box Number [id number], Rate 70, and Rhythm nsr  Order for Tele Monitor? Yes    Additional Lines: N/A    Medicines sent: N/A    Any special needs or follow-up needed: N/A    Patient belongings transferred with patient: Yes    Chart send with patient: Yes    Notified: Family    Patient reassessed at: 12/19/2024 1533  1  Upon arrival to floor: cardiac monitor applied, patient oriented to room, call bell in reach, and bed in lowest position

## 2024-12-20 NOTE — PROGRESS NOTES
[facility] ([facility])  Cardiology  Progress Note    Patient Name: Crow Green  MRN: [id number]  Admission Date: 12/19/2024  Hospital Length of Stay: 1 days  Code Status: Full Code   Attending Physician: Denis Lockhart MD   Primary Care Physician: Cm Polanco MD  Expected Discharge Date: 12/20/2024  Principal Problem:Chest pain    Subjective:   HPI:  presents to the Emergency Department for evaluation of L sided chest pain which onset around 4:00-5:00 pm upon waking up this morning. Pt states his pain has slightly relieved since arrival. Symptoms are now intermittent and 5/10 in severity. Pt now c/o of pain around his eyes. No mitigating or exacerbating factors reported. Associated sxs include dizziness. Patient denies any wounds, active bleeding, and all other sxs at this time. Pt is compliant with his medication. Prior Tx includes Aspirin via EMS en route. No further complaints or concerns at this time         CARDIOLOGY CONSULT FOR CHEST PAIN  PMH CAD nonobstructive and small vessel Dz per C done in 11/16, PAF s/p PVI in  by Dr. Brown, CHFpEF 45%, DM, hTN, HLD and obesity. Asthma and SANDRITA on CPAP f/u with Dr. Alicea.   Bnp 101  TROPONIN 0.103, CHRONICALLY ELEVATED  cR 2.2 hgb 12.4  POSITIVE TENDERNESS ON LOWER STERNAL AREA       Hospital Course:   12/20/24-Patient seen and examined today, resting in bed. Still has CP precipitated by certain movements. Troponin remains flat.        Review of Systems   Constitutional: Positive for malaise/fatigue.   HENT: Negative.     Eyes: Negative.    Cardiovascular:  Positive for chest pain (atypical).   Respiratory: Negative.     Endocrine: Negative.    Hematologic/Lymphatic: Negative.    Skin: Negative.    Musculoskeletal: Negative.    Gastrointestinal: Negative.    Genitourinary: Negative.    Neurological: Negative.    Psychiatric/Behavioral: Negative.     Allergic/Immunologic: Negative.      Objective:     Vital Signs (Most Recent):  Temp: 97.4 °F (36.3  °C) (12/20/24 1158)  Pulse: 80 (12/20/24 1158)  Resp: 18 (12/20/24 1158)  BP: (!) 116/59 (12/20/24 1158)  SpO2: (!) 92 % (12/20/24 1158) Vital Signs (24h Range):  Temp:  [97.4 °F (36.3 °C)-98.5 °F (36.9 °C)] 97.4 °F (36.3 °C)  Pulse:  [72-84] 80  Resp:  [16-20] 18  SpO2:  [92 %-97 %] 92 %  BP: (113-133)/(58-77) 116/59     Weight: 81.6 kg (180 lb)  Body mass index is 29.95 kg/m².     SpO2: (!) 92 %         Intake/Output Summary (Last 24 hours) at 12/20/2024 1426  Last data filed at 12/20/2024 0813  Gross per 24 hour   Intake 360 ml   Output 550 ml   Net -190 ml       Lines/Drains/Airways       Peripheral Intravenous Line  Duration                  Peripheral IV - Single Lumen 12/19/24 0531 20 G Right Antecubital 1 day         Peripheral IV - Single Lumen 12/19/24 0532 18 G Left Antecubital 1 day         Peripheral IV - Single Lumen 12/19/24 0810 22 G Right;Medial Antecubital 1 day                       Physical Exam  Vitals and nursing note reviewed.   Constitutional:       General: He is not in acute distress.     Appearance: Normal appearance. He is well-developed. He is not diaphoretic.   HENT:      Head: Normocephalic and atraumatic.   Eyes:      General:         Right eye: No discharge.         Left eye: No discharge.      Pupils: Pupils are equal, round, and reactive to light.   Cardiovascular:      Rate and Rhythm: Normal rate and regular rhythm.      Heart sounds: Normal heart sounds, S1 normal and S2 normal. No murmur heard.     Comments: +chest soreness  Pulmonary:      Effort: Pulmonary effort is normal.   Musculoskeletal:      Right lower leg: No edema.      Left lower leg: No edema.   Skin:     General: Skin is warm and dry.      Findings: No erythema.   Neurological:      Mental Status: He is alert and oriented to person, place, and time.   Psychiatric:         Mood and Affect: Mood normal.         Behavior: Behavior normal.         Thought Content: Thought content normal.            Significant Labs:  CMP   Recent Labs   Lab 12/19/24  0533 12/20/24  0427    141   K 4.5 4.3    111*   CO2 19* 20*   * 78   BUN 24* 23   CREATININE 2.2* 1.9*   CALCIUM 8.7 7.9*   PROT 7.6 6.2   ALBUMIN 3.2* 2.8*   BILITOT 0.3 0.3   ALKPHOS 74 61   AST 28 14   ALT 22 14   ANIONGAP 13 10   , CBC   Recent Labs   Lab 12/19/24  0533 12/20/24  0427   WBC 8.84 7.63   HGB 12.4* 10.4*   HCT 39.7* 33.9*    161   , Troponin   Recent Labs   Lab 12/19/24  1055 12/19/24  1522 12/19/24  1809   TROPONINI 0.103* 0.112* 0.112*   , and All pertinent lab results from the last 24 hours have been reviewed.    Significant Imaging: Echocardiogram: Transthoracic echo (TTE) complete ([facility] Only):   Results for orders placed or performed during the hospital encounter of 11/13/24   Echo   Result Value Ref Range    BSA 1.99 m2    LVOT stroke volume 65.1 cm3    LVIDd 4.5 3.5 - 6.0 cm    LV Systolic Volume 42.67 mL    LV Systolic Volume Index 22.0 mL/m2    LVIDs 3.3 2.1 - 4.0 cm    LV Diastolic Volume 91.14 mL    LV Diastolic Volume Index 46.98 mL/m2    Left Ventricular End Systolic Volume by Teichholz Method 42.67 mL    Left Ventricular End Diastolic Volume by Teichholz Method 91.14 mL    IVS 1.6 (A) 0.6 - 1.1 cm    LVOT diameter 2.1 cm    LVOT area 3.5 cm2    FS 26.7 (A) 28 - 44 %    Left Ventricle Relative Wall Thickness 0.71 cm    PW 1.6 (A) 0.6 - 1.1 cm    LV mass 304.6 g    LV Mass Index 157.0 g/m2    MV Peak E Mathew 1.13 m/s    TDI LATERAL 0.07 m/s    TDI SEPTAL 0.07 m/s    E/E' ratio 16.14 m/s    MV Peak A Mathew 0.75 m/s    E/A ratio 1.51     IVRT 57.09 msec    E wave deceleration time 256.48 msec    LV SEPTAL E/E' RATIO 16.14 m/s    LV LATERAL E/E' RATIO 16.14 m/s    LVOT peak mathew 0.9 m/s    Left Ventricular Outflow Tract Mean Velocity 0.73 cm/s    Left Ventricular Outflow Tract Mean Gradient 2.14 mmHg    RVOT peak VTI 21.3 cm    TAPSE 1.99 cm    LA size 4.37 cm    Left Atrium Minor Axis 5.22 cm    Left Atrium Major Axis 5.25 cm    RA  Major Axis 3.57 cm    AV mean gradient 5.9 mmHg    AV peak gradient 10.2 mmHg    Ao peak myrna 1.6 m/s    Ao VTI 33.9 cm    LVOT peak VTI 18.8 cm    AV valve area 1.9 cm²    AV Velocity Ratio 0.56     AV index (prosthetic) 0.55     PRAMOD by Velocity Ratio 1.9 cm²    MV mean gradient 2 mmHg    MV peak gradient 5 mmHg    MV stenosis pressure 1/2 time 74.38 ms    MV valve area p 1/2 method 2.96 cm2    MV valve area by continuity eq 1.97 cm2    MV VTI 33.0 cm    PV mean gradient 3 mmHg    RVOT peak myrna 1.07 m/s    Ao root annulus 3.46 cm    STJ 3.36 cm    Ascending aorta 3.28 cm    IVC diameter 1.77 cm    Mean e' 0.07 m/s    ZLVIDS -0.21     ZLVIDD -2.03     MAYDA 30.1 mL/m2    LA Vol 58.34 cm3    LA WIDTH 3.0 cm    RA Width 2.6 cm    Est. RA pres 3 mmHg    Narrative      Left Ventricle: The left ventricle is normal in size. There is moderate   concentric hypertrophy. There is low normal systolic function with a   visually estimated ejection fraction of 50 - 55%. There is normal   diastolic function.    Right Ventricle: Normal right ventricular cavity size. Systolic   function is normal.    Left Atrium: Left atrium is mildly dilated.    Aortic Valve: There is mild stenosis. Aortic valve area by VTI is 1.9   cm². Aortic valve peak velocity is 1.6 m/s. Mean gradient is 5.9 mmHg. The   dimensionless index is 0.55.    Mitral Valve: There is mild bileaflet sclerosis. There is mild   posterior mitral annular calcification present. There is mild   regurgitation.    IVC/SVC: Normal venous pressure at 3 mmHg.      and EKG: Reviewed  Assessment and Plan:   Patient who presents with atypical CP/chronic troponin elevation. Stable CV wise. Can f/u in clinic.    * Chest pain  CHEST PAIN WITH POSITIVE TENDERNESS ON LOWER STERNAL AREA. NON ISCHEMIA PAIN   CHRONIC ELEVATION OF TROPONIN FLAT  CAD   H/O COCAINE ABUSE    CONTINUE HOME MED  TRENDING TROPONIN     12/20/24  -Troponin flat, 0.118>0.103>0.112  -CP atypical with tenderness on  exam  -Can f/u as OP    Elevated troponin  CHRONIC ELEVATION      PAF (paroxysmal atrial fibrillation)  CONTINUE ELIQUIS     Chronic combined systolic and diastolic heart failure  COMPENSATED    CONTINUE LASIX ACEI  Avoid BB DUE TO H/O COCAINE ABUSE    Essential hypertension  RESUME HOME MED        VTE Risk Mitigation (From admission, onward)           Ordered     apixaban tablet 5 mg  2 times daily         12/19/24 1451     IP VTE HIGH RISK PATIENT  Once         12/19/24 1451     Place sequential compression device  Until discontinued         12/19/24 1451                    Evelyn Bird PA-C  Cardiology  [facility] ([facility])

## 2024-12-20 NOTE — ASSESSMENT & PLAN NOTE
Hemoglobin A1C   Date Value Ref Range Status   11/08/2024 5.7 (H) 4.0 - 5.6 % Final     Comment:     ADA Screening Guidelines:  5.7-6.4%  Consistent with prediabetes  >or=6.5%  Consistent with diabetes    High levels of fetal hemoglobin interfere with the HbA1C  assay. Heterozygous hemoglobin variants (HbS, HgC, etc)do  not significantly interfere with this assay.   However, presence of multiple variants may affect accuracy.     04/10/2024 5.7 (H) 4.0 - 5.6 % Final     Comment:     ADA Screening Guidelines:  5.7-6.4%  Consistent with prediabetes  >or=6.5%  Consistent with diabetes    High levels of fetal hemoglobin interfere with the HbA1C  assay. Heterozygous hemoglobin variants (HbS, HgC, etc)do  not significantly interfere with this assay.   However, presence of multiple variants may affect accuracy.     09/29/2023 6.4 (H) 4.0 - 5.6 % Final     Comment:     ADA Screening Guidelines:  5.7-6.4%  Consistent with prediabetes  >or=6.5%  Consistent with diabetes    High levels of fetal hemoglobin interfere with the HbA1C  assay. Heterozygous hemoglobin variants (HbS, HgC, etc)do  not significantly interfere with this assay.   However, presence of multiple variants may affect accuracy.        -DM appears controlled  -Monitor glucose with AM lab draws

## 2024-12-20 NOTE — DISCHARGE INSTRUCTIONS
Our goal at [facility] is to always give you outstanding care and exceptional service. You may receive a survey by mail, text or e-mail in the next 7-10 days from Haily Avila and our leadership team asking about the care you received with us. The survey should only take 5-10 minutes to complete and is very important to us.     Your feedback provides us with a way to recognize our staff who work tirelessly to provide the best care! Also, your responses help us learn how to improve when your experience was below our aspiration of excellence. We WILL use your feedback to continue making improvements to help us provide the highest quality care. We keep your personal information and feedback confidential. We appreciate your time completing this survey and can't wait to hear from you!!!     We want you to leave us today feeling like you can DEFINITELY recommend us to others! We look forward to your continued care with us! Thanks so much for choosing [facility] for your healthcare needs!

## 2024-12-20 NOTE — TELEPHONE ENCOUNTER
See below:  ----- Message from Eduard Zhao MD sent at 12/20/2024  2:01 PM CST -----      Elevated periop risk of CV events for non-high risk procedure.  Good functional and exercise capacity.  No chest pain, active arrhythmia and CHF symptoms.  Ok to proceed the scheduled surgery without further cardiac study.  OK to hold Eliquis 2 days before the procedure and resume ASAP postop.  ----- Message -----  From: Dang Tyler MA  Sent: 12/18/2024   3:47 PM CST  To: Eduard Zhao MD; Maria Fernanda Montes De Oca MD    Good afternoon!   Dr. Montes De Oca would like for Mr. Crow Green to have a Right CEA done. Is he cleared from a cardiology standpoint? You last saw him 11/29/24!  Thanks in advance.

## 2024-12-20 NOTE — HOSPITAL COURSE
12/20/24-Patient seen and examined today, resting in bed. Still has CP precipitated by certain movements. Troponin remains flat.

## 2024-12-20 NOTE — PLAN OF CARE
[facility] (H[facility])  Discharge Final Note    Primary Care Provider: Cm Polanco MD    Expected Discharge Date: 12/20/2024    Final Discharge Note (most recent)       Final Note - 12/20/24 1312          Final Note    Assessment Type Final Discharge Note     Anticipated Discharge Disposition Home-Health Care Svc        Post-Acute Status    Post-Acute Authorization Home Health     Home Health Status Set-up Complete/Auth obtained                     Important Message from Medicare             Contact Info       Cm Polanco MD   Specialty: Family Medicine   Relationship: PCP - General    [address] [place] [id number]   Phone: [phone]       Next Steps: Follow up in 1 week(s)          DC Dispo: home with resumption of [facility]  PCP f/u: CM unable to schedule within 1 week, message left with clinic staff.  Neurology f/u: Dec 31

## 2024-12-20 NOTE — ASSESSMENT & PLAN NOTE
Patient has paroxysmal (<7 days) atrial fibrillation. Patient is currently in sinus rhythm. [id number] Score: 3. The patients heart rate in the last 24 hours is as follows:  Pulse  Min: 74  Max: 86     Antiarrhythmics       Anticoagulants  apixaban tablet 5 mg, 2 times daily, Oral    Plan  - Replete lytes with a goal of K>4, Mg >2  - Patient is anticoagulated, see medications listed above.  - Patient's afib is currently controlled

## 2024-12-22 LAB
OHS QRS DURATION: 142 MS
OHS QTC CALCULATION: 500 MS

## 2024-12-23 NOTE — DISCHARGE INSTRUCTIONS
Follow-up with your primary care doctor the next 2-3 days for recheck.  Return to the emergency department for any worsening signs or symptoms.    Preop Update       Alvin Lee Goldberg was seen in the preop holding area.    The patient was unaccompanied.    I have reviewed the patient's H&P which was performed by Nata Segura NP on 12/5/2024 and there have been no significant changes since that visit.    I have examined the patient and there is no significant change in the physical exam since his last visit with me.    I again reviewed the nature of the procedure proposed (robotic-assisted right thoracoscopy with wedge excisional biopsy of upper and lower lobe consolidations). I reviewed the risks, expected benefits, and alternatives. Questions were invited and answered. After discussion, the patient expressed understanding and reiterated the desire to proceed as outlined. He has provided written informed consent.

## 2024-12-24 LAB
BACTERIA BLD CULT: NORMAL
BACTERIA BLD CULT: NORMAL

## 2024-12-30 NOTE — PROGRESS NOTES
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 reflexes are 2+ and symmetric in all four extremities.    Coordination  Right: Finger-to-nose normal. Rapid alternating movement normal. Assessment was unable to be completed due to the patient's balance difficulties and fall risk.  .Left: Finger-to-nose normal. Rapid alternating movement normal. Assessment was unable to be completed due to the patient's balance difficulties and fall risk.  .    Gait  Casual gait: Wide stance. Reduced stride length. Hesitant gait. Reduced right arm swing. Reduced left arm swing. Assessment was unable to be completed due to the patient's balance difficulties and fall risk.  . Assessment was unable to be completed due to the patient's balance difficulties and fall risk.  . Assessment was unable to be completed due to the patient's balance difficulties and fall risk.  . Romberg is absent. Normal pull test. Able to rise from chair without using arms.  The patient uses a rollator walker to assist with ambulation..        Lab Results   Component Value Date    WBC 7.63 12/20/2024    HGB 10.4 (L) 12/20/2024    HCT 33.9 (L) 12/20/2024    MCV 95 12/20/2024     12/20/2024       Sodium   Date Value Ref Range Status   12/20/2024 141 136 - 145 mmol/L Final     Potassium   Date Value Ref Range Status   12/20/2024 4.3 3.5 - 5.1 mmol/L Final     Chloride   Date Value Ref Range  Status   12/20/2024 111 (H) 95 - 110 mmol/L Final     CO2   Date Value Ref Range Status   12/20/2024 20 (L) 23 - 29 mmol/L Final     Glucose   Date Value Ref Range Status   12/20/2024 78 70 - 110 mg/dL Final     BUN   Date Value Ref Range Status   12/20/2024 23 8 - 23 mg/dL Final     Creatinine   Date Value Ref Range Status   12/20/2024 1.9 (H) 0.5 - 1.4 mg/dL Final     Calcium   Date Value Ref Range Status   12/20/2024 7.9 (L) 8.7 - 10.5 mg/dL Final     Total Protein   Date Value Ref Range Status   12/20/2024 6.2 6.0 - 8.4 g/dL Final     Albumin   Date Value Ref Range Status   12/20/2024 2.8 (L) 3.5 - 5.2 g/dL Final   12/04/2014 4.0 3.6 - 5.1 g/dL Final     Comment:     @ Test Performed By:  [facility]  Gio De León M.D., FCAP.,   [address] [place] [id number]  [facility] #[id number]       Total Bilirubin   Date Value Ref Range Status   12/20/2024 0.3 0.1 - 1.0 mg/dL Final     Comment:     For infants and newborns, interpretation of results should be based  on gestational age, weight and in agreement with clinical  observations.    Premature Infant recommended reference ranges:  Up to 24 hours.............<8.0 mg/dL  Up to 48 hours............<12.0 mg/dL  3-5 days..................<15.0 mg/dL  6-29 days.................<15.0 mg/dL       Alkaline Phosphatase   Date Value Ref Range Status   12/20/2024 61 40 - 150 U/L Final     AST   Date Value Ref Range Status   12/20/2024 14 10 - 40 U/L Final     ALT   Date Value Ref Range Status   12/20/2024 14 10 - 44 U/L Final     Anion Gap   Date Value Ref Range Status   12/20/2024 10 8 - 16 mmol/L Final     eGFR if    Date Value Ref Range Status   07/31/2022 >60 >60 mL/min/1.73 m^2 Final     eGFR    Date Value Ref Range Status   07/28/2023 43 mL/min/1.73mSq Final     Comment:     In accordance with NKF-ASN Task Force recommendation, calculation based on the Chronic Kidney Disease  "Epidemiology Collaboration (CKD-EPI) equation without adjustment for race. eGFR adjusted for gender and age and calculated in ml/min/1.73mSquared. eGFR cannot be calculated if patient is under 18 years of age.     Reference Range:   >= 60 ml/min/1.73mSquared.     eGFR if non    Date Value Ref Range Status   07/31/2022 >60 >60 mL/min/1.73 m^2 Final     Comment:     Calculation used to obtain the estimated glomerular filtration  rate (eGFR) is the CKD-EPI equation.          Lab Results   Component Value Date    DMBAFGUT93 623 10/11/2024       Lab Results   Component Value Date    TSH 0.846 11/08/2024       No results found in the last 24 hours.    No results found in the last 24 hours.    Reviewed the neuroimaging independently       Assessment:   69 Years old Male with PMH as above came for an evaluation of "Headaches"    1. Intractable chronic migraine with aura and without status migrainosus        2. Nausea        3. Dizziness and giddiness        4. Neck tightness        5. Balance problem        6. Blurry vision, bilateral        7. Non compliance w medication regimen        8. SANDRITA (obstructive sleep apnea)        9. Elevated sed rate        10. Elevated C-reactive protein (CRP)        11. Type 2 diabetes mellitus with stage 3a chronic kidney disease, with long-term current use of insulin        12. Bilateral carotid artery stenosis        13. Poor short term memory           Plan:   The neurological assessment of the patient reveals bilateral lower extremity weakness, balance and coordination difficulties, and tenderness upon palpation in the left temporal and parietal regions, as well as bilateral neck tenderness.      Intractable chronic migraine with aura and without status migrainosus     -Continue Topamax 25 mg PO BID for Headache Prevention Therapy. No HX of Kidney Stones. Side effects discussed (mental slowing, transient tingling, kidney stones, weight loss, cleft lip and palate and " rarely glaucoma and visual field defects). The patient was encouraged to drink a lot of fluids. Patient verbalized understanding.     -The patient has a past medical history of primary open-angle glaucoma (POAG) in both eyes, moderate stage, as noted in the ophthalmology referral. He was informed about the potential side effects of Topamax - particularly glaucoma and the possibility of worsening his current condition. The patient verbalized his full understanding of these risks and expressed a desire to remain on his current medication. Although alternative options were discussed, the patient declined them and wishes to continue taking Topamax due to its effectiveness in managing his headaches. No side effects noted per patient.     -Reviewed results of Brain MRI WO Contrast / RPR / HIV / AMOL / B-12 / FA / Patient verbalized understanding.     -Continue PT for Migraine Therapy / Neck Tightness / Vestibular Therapy / Bilateral leg weakness /  Balance issues /     Nausea     Patient declines pharmacological therapy.      Dizziness and giddiness     -Continue ENT referral for further evaluation and recommendation of bilateral tinnitus (resolved) / dizziness     Neck tightness / Balance problem     - Continue PT for Migraine Therapy / Neck Tightness / Vestibular Therapy / Bilateral leg weakness /  Balance issues /     Blurry vision, bilateral     -Continue Ophthalmology Evaluation for vision abnormalities, especially being on Topamax.     Non compliance w medication regimen     -The patient reports active home health services for medication assistance. The patient mentioned that the home health nurse had been organizing his medications weekly. Services continued to provide the necessary support. Patient instructed on importance of medication compliance. He verbalized understanding.     SANDRITA (obstructive sleep apnea)     -Continue Referral to Sleep Disorders for management of SANDRITA and mask evaluation. Patient instructed on  the importance of compliance with CPAP Machine for SANDRITA. He verbalized understanding.      Elevated sed rate / Elevated C-reactive protein (CRP)     -Continue Referral to Rheumatologist for elevated ESR / CRP    Type 2 diabetes mellitus with stage 3a chronic kidney disease, with long-term current use of insulin     -DM- A1C elevated (5.7). Patient is instructed on lifestyle modifications such as heart healthy diet, limiting excess sugar, limiting excess fried and fatty foods, weight management, eating lean meats and vegetables, staying hydrated, avoiding alcohol and smoking, and exercising at least 30 minutes per day. Patient instructed to continue taking medications as prescribed and to continue to follow PCP for management. Patient verbalizes understanding.      Carotid stenosis, right     -Continue Referral to Vascular Surgery for evaluation of Severe approximately 80-85% stenosis of the proximal right cervical internal carotid artery. Severe stenosis of the right V1 segment. Patient unsure if this was addressed.    Poor short term memory     -Will plan to address short-term memory concerns at next visit. Patient declines to discuss today.               LABORATORY EVALUATION    Labs: (2024) Procalcitonin / Lactic Acid / Uric acid / PSA / TSH / Lipid Panel / RPR / HIV / AMOL / B-12 / FA /  Lactic Acid / CBC / CMP / Aldolase / PSA / Rheumatoid Factor  - personally reviewed -non-significant abnormalities except     -Vitamin D (18) - followed by PCP - patient reports he is taking a vitamin-D supplement.    -BUN (23) / Creatinine (1.9) / GFR (38)  - Followed by PCP    -Toxicology Screen: Cocaine / Opiates - Followed by PCP    -A1C (5.7) - Followed by PCP - Patient taking Ozempic / Jardiance /    ESR (92)  / CRP (9.6) - elevated - Orderd Referral to Rheumatology      RADIOLOGY EVALUATION     Bilateral Cartoid US - ordered 08/2024 -  Continue Referral to Vascular Surgery  Right carotid velocities are consistent with an  80-99% stenosis.   The right carotid bifurcation is located in the lower mid neck.   Left carotid velocities are consistent with a 60-79% stenosis.     Personally Reviewed Brain MRI WO - done 11/2024 - No acute or focal abnormality. Mild cerebral atrophy and white matter degeneration     Personally Reviewed CTA Neck - done 2024 - report only - No evidence of acute vascular injury within the neck. Severe approximately 80-85% stenosis of the proximal right cervical internal carotid artery.   Severe stenosis of the right V1 segment. - Sent referral to Vascular Surgery - Patient unsure if this was addressed.     Personally Reviewed Head CT WO Contrast - done 07/2024  - report only - No acute intracranial abnormality. Findings of chronic small vessel ischemic disease.     Personally Reviewed CT Thoracic Spine WO Contrast -  done 07/2024  - report only - No acute fracture or traumatic malalignment.     Personally Reviewed CT Lumbar Spine WO Contrast -  done 07/2024  - report only - No acute fracture or traumatic malalignment. Degenerative changes as above.       NEUROPHYSIOLOGY EVALUATION       PATHOLOGY EVALUATION        NEUROCOGNITIVE AND NEUROPSYCHOLOGY EVALUATION                         MIGRAINE, COMMON, WITH AURA, EPISODIC, HIGH FREQUENCY       MANAGEMENT       HEADACHE DIARY     DISCUSSED THE THREE-FOLD MANAGEMENT OF MIGRAINE:      LIFESTYLE CHANGES:       Good sleep hygiene  Avoid general triggers like lack of sleep/too much sleep, prolonged sun exposure, excessive screen time and specific triggers based on you own diary   Minimize physical and emotional stress  Smoking avoidance and cessation  Limit caffeine drinks to 1-2 a day   Good hydration   Small frequent meals and avoid skipping meals   Moderate 30-minute-long aerobic exercises 3 times/week. Avoid strenuous exercise         ABORTIVE MEDICATIONS (ACUTE-RESCUE MEDICATIONS):     Should only be taken 2-3 times/week to avoid rebound and overuse  headaches.    I-explained to the patient that pain meds especially triptans should NOT be taken daily to avoid Rebound Headache and Overuse Headache.    Take at the ONSET of the headache sumatriptan 100 mg PO  (or other Triptan) in combination with naproxen 500 mg PO or Ibuprofen 800 mg PO for headache without nausea or vomiting.  This regimen can be repeated only once in 24 hours after 2 hours.    Side effects of triptans were discussed and include rare cardiac and cerebral ischemia and cannot be used with migraine associate with focal neurological deficits (complicated migraine) in addition to drowsiness and potential impairment of driving ability. The patient verbalized understanding.    NSAIDs can cause peptic ulcers, renal insufficiency and may increase the risk of cardiovascular diseases.  SEs were discussed with the patient. The patient verbalized understanding.    Triptans have shown to be more effective than Gepatns with more SE/AE.      AVOID NARCOTICS (OPIATES)      1. No randomized controlled study shows pain-free results with opioids in the treatment of migraine.     2. The physiologic consequences of opioid use are adverse, occur quickly, and can be permanent. Decreased gray matter, release of calcitonin gene-related peptide, dynorphin, and pro-inflammatory peptides, and activation of excitatory glutamate receptors are all associated with opioid exposure.     3. Opioids are pro-nociceptive, prevent reversal of migraine central sensitization, and interfere with triptan effectiveness.     4.Opioids precipitate bad clinical outcomes, especially transformation to daily headache.     5. They cause disease progression, comorbidity, and excessive health care consumption.           NEXT OPTIONS:    Triptans: Sumatriptan (Imitrex), Rizatriptan (Maxalt).    Gepants: Nuretc (rimegepant)75 mg >Ubrelvy (ubrogepant) 100 mg    Ditans: Reyvow (lasmiditan) 100 mg (No driving due to sedation)    Fioricet without codeine  with Reglan.      Prednisone with Reglan.      LAST RESORT:     DHE NS Trudhesa (Max 2 a week)     C/I: concomitant use of vasoconstrictors like Triptans, strong CY inhibitors such as HAART PIs (eg, ritonavir, nelfinavir, or indinavir) and Macrolides (eg, erythromycin or clarithromycin), CAD, PVD, Stroke/TIA and Uncontrolled HTN.  Serious SEs include Vasospasm and Fibrosis (chronic use).       IMPENDING STATUS: Prednisone and Vistaril.    STATUS MIGRAINOSUS: ED-Infusion for Status Protocol.        PREVENTATIVE (MORE ACCURATELY MIGRAINE REDUCTION) MEDICATIONS:           Since the patient's headache is very frequent a lengthy discussion about preventative medications was carried out.The patient understands that prevention means DECREASING frequency and severity and NOT elimination.The patient was made aware that any new medication can cause serious allergic reaction.The medication is considered failure only if a therapeutic dose reached and maintained for 6-8 weeks.        HELPFUL SUPPLEMENTS:     Helpful supplements include Co-Q 10, B2, Mg, Feverfew (Dolovent combination) and butterbur (Petadolex)        NEUROPHARMACOLOGY     NEXT OPTIONS:     Zonisamide/Zonegran (ZNS) 100-400 mg QHS is a good alternative to TPM in case of SE/AE.     Amitriptyline/Elavil (TCA) slow titration to 100-Age which can cause sleepiness, dry eyes, dry mouth, urinary retention, and rarely cardiac arrhythmias    Propranolol/Inderal  (BB)slow titration to 80 mg BID which can cause low blood pressure, slow heart rate, erectile dysfunction, depression, airway obstruction and heart failure exacerbations. Cannot be used with migraine associate with focal neurological deficits.    Lamotrigine/Lamictal  (LTG)slow titration to 100 mg BID which can cause serious skin rash and rare cardiac arrhythmias. LTG is superior to other therapies for specifically reducing migraine aura.     ANTI-CGRP AGENTS: Qulipta (alogepant) 60 mg QD, Erenumab (Aimovig)  140 mg SQ Pen monthly (Reported cases of Constipation and BP elevation) , Galcanezumab (Emgality) 120 mg SQ Pen monthly after a loading dose of 240 mg  and Fremnezumab (Ajovy) (Ligand Blocker): 225 mg SQ monthly or 675 mg every 3 months     Botox 200 units every 3 months.         LAST RESORT OPTIONS:      Namenda 10 mg BID     Valproic acid/ Depakote         NEUROMODULATION     Cefaly, Relivion, Nerivio and GammaCore (VNS)           SCHOOL AND WORK ACCOMMODATIONS        Allow the patient to wear sunglasses or a cap and switch out fluorescent bulbs.    Allow the patient to arrive 5 minutes later and leave 5 minutes earlier to avoid noisy traffic.    Allow the patient to carry a water bottle and refill as needed.    Allow the patient to snack whenever is needed.    Allow the patient to decrease the computer brightness.    Allow the patient to take breaks as needed and extra time for assignments and deadlines.    Allow the patient to avoid strenuous activity as needed.         MEDICAL/SURGICAL COMORBIDITIES     All relevant medical comorbidities noted and managed by primary care physician and medical care team.          HEALTHY LIFESTYLE AND PREVENTATIVE CARE    The patient to adhere to the age-appropriate health maintenance guidelines including screening tests and vaccinations. The patient to adhere to  healthy lifestyle, optimal weight, exercise, healthy diet, good sleep hygiene and avoiding drugs including smoking, alcohol and recreational drugs.    I spent a total of 44 minutes on the day of the visit.This includes face to face time and non-face to face time preparing to see the patient (eg, review of tests), obtaining and/or reviewing separately obtained history, documenting clinical information in the electronic or other health record, independently interpreting results and communicating results to the patient/family/caregiver, or care coordinator.     Please do not hesitate to contact me with any updates,  questions or concerns.    No follow-ups on file.    Rasheed Abreu, MSN, FNP-C    General Neurology

## 2024-12-31 ENCOUNTER — OFFICE VISIT (OUTPATIENT)
Dept: NEUROLOGY | Facility: CLINIC | Age: 69
End: 2024-12-31
Payer: MEDICARE

## 2024-12-31 VITALS
HEART RATE: 71 BPM | WEIGHT: 179.88 LBS | DIASTOLIC BLOOD PRESSURE: 64 MMHG | SYSTOLIC BLOOD PRESSURE: 104 MMHG | HEIGHT: 65 IN | BODY MASS INDEX: 29.97 KG/M2

## 2024-12-31 DIAGNOSIS — G43.E19 INTRACTABLE CHRONIC MIGRAINE WITH AURA AND WITHOUT STATUS MIGRAINOSUS: Primary | ICD-10-CM

## 2024-12-31 DIAGNOSIS — R70.0 ELEVATED SED RATE: ICD-10-CM

## 2024-12-31 DIAGNOSIS — Z79.4 TYPE 2 DIABETES MELLITUS WITH STAGE 3A CHRONIC KIDNEY DISEASE, WITH LONG-TERM CURRENT USE OF INSULIN: ICD-10-CM

## 2024-12-31 DIAGNOSIS — R42 DIZZINESS AND GIDDINESS: ICD-10-CM

## 2024-12-31 DIAGNOSIS — Z91.148 NON COMPLIANCE W MEDICATION REGIMEN: ICD-10-CM

## 2024-12-31 DIAGNOSIS — R29.898 NECK TIGHTNESS: ICD-10-CM

## 2024-12-31 DIAGNOSIS — I65.23 BILATERAL CAROTID ARTERY STENOSIS: ICD-10-CM

## 2024-12-31 DIAGNOSIS — R26.89 BALANCE PROBLEM: ICD-10-CM

## 2024-12-31 DIAGNOSIS — N18.31 TYPE 2 DIABETES MELLITUS WITH STAGE 3A CHRONIC KIDNEY DISEASE, WITH LONG-TERM CURRENT USE OF INSULIN: ICD-10-CM

## 2024-12-31 DIAGNOSIS — H53.8 BLURRY VISION, BILATERAL: ICD-10-CM

## 2024-12-31 DIAGNOSIS — R79.82 ELEVATED C-REACTIVE PROTEIN (CRP): ICD-10-CM

## 2024-12-31 DIAGNOSIS — R11.0 NAUSEA: ICD-10-CM

## 2024-12-31 DIAGNOSIS — G47.33 OSA (OBSTRUCTIVE SLEEP APNEA): ICD-10-CM

## 2024-12-31 DIAGNOSIS — E11.22 TYPE 2 DIABETES MELLITUS WITH STAGE 3A CHRONIC KIDNEY DISEASE, WITH LONG-TERM CURRENT USE OF INSULIN: ICD-10-CM

## 2024-12-31 DIAGNOSIS — R41.3 POOR SHORT TERM MEMORY: ICD-10-CM

## 2024-12-31 PROCEDURE — 99215 OFFICE O/P EST HI 40 MIN: CPT | Mod: S$GLB,,,

## 2024-12-31 PROCEDURE — 99999 PR PBB SHADOW E&M-EST. PATIENT-LVL V: CPT | Mod: PBBFAC,,,

## 2024-12-31 PROCEDURE — 3074F SYST BP LT 130 MM HG: CPT | Mod: CPTII,S$GLB,,

## 2024-12-31 PROCEDURE — 1101F PT FALLS ASSESS-DOCD LE1/YR: CPT | Mod: CPTII,S$GLB,,

## 2024-12-31 PROCEDURE — 3078F DIAST BP <80 MM HG: CPT | Mod: CPTII,S$GLB,,

## 2024-12-31 PROCEDURE — 3044F HG A1C LEVEL LT 7.0%: CPT | Mod: CPTII,S$GLB,,

## 2024-12-31 PROCEDURE — 3066F NEPHROPATHY DOC TX: CPT | Mod: CPTII,S$GLB,,

## 2024-12-31 PROCEDURE — 1160F RVW MEDS BY RX/DR IN RCRD: CPT | Mod: CPTII,S$GLB,,

## 2024-12-31 PROCEDURE — 4010F ACE/ARB THERAPY RXD/TAKEN: CPT | Mod: CPTII,S$GLB,,

## 2024-12-31 PROCEDURE — 1111F DSCHRG MED/CURRENT MED MERGE: CPT | Mod: CPTII,S$GLB,,

## 2024-12-31 PROCEDURE — 3062F POS MACROALBUMINURIA REV: CPT | Mod: CPTII,S$GLB,,

## 2024-12-31 PROCEDURE — 1125F AMNT PAIN NOTED PAIN PRSNT: CPT | Mod: CPTII,S$GLB,,

## 2024-12-31 PROCEDURE — 3008F BODY MASS INDEX DOCD: CPT | Mod: CPTII,S$GLB,,

## 2024-12-31 PROCEDURE — 1159F MED LIST DOCD IN RCRD: CPT | Mod: CPTII,S$GLB,,

## 2024-12-31 PROCEDURE — 3288F FALL RISK ASSESSMENT DOCD: CPT | Mod: CPTII,S$GLB,,

## 2025-01-04 PROCEDURE — G0179 MD RECERTIFICATION HHA PT: HCPCS | Mod: ,,, | Performed by: FAMILY MEDICINE

## 2025-01-15 ENCOUNTER — TELEPHONE (OUTPATIENT)
Dept: PODIATRY | Facility: CLINIC | Age: 70
End: 2025-01-15
Payer: MEDICARE

## 2025-01-15 ENCOUNTER — TELEPHONE (OUTPATIENT)
Dept: INTERNAL MEDICINE | Facility: CLINIC | Age: 70
End: 2025-01-15
Payer: MEDICARE

## 2025-01-15 NOTE — TELEPHONE ENCOUNTER
Tried calling the patient with no answer at [phone] and unable to leave a message. Tried the mobile number [phone] with the number your trying to call is not reach able.

## 2025-01-15 NOTE — TELEPHONE ENCOUNTER
Spoke with the pt and scheduled him for 2/13/25 at 10:30              ----- Message from Katy sent at 1/15/2025  9:33 AM CST -----  Contact: SHAGGY JASON [[id number]]  .Type:  Sooner Apoointment Request    Caller is requesting a sooner appointment.  Caller declined first available appointment listed below.  Caller will not accept being placed on the waitlist and is requesting a message be sent to doctor.  Name of Caller:SHAGGY JASON [[id number]]  When is the first available appointment?Feb 13  Symptoms:3 month nail care  Would the patient rather a call back or a response via [facility]? Call back  Best Call Back Number:.[phone] (home)    Additional Information:

## 2025-01-15 NOTE — TELEPHONE ENCOUNTER
----- Message from Katy sent at 1/15/2025  9:34 AM CST -----  Contact: SHAGGY JASON [[id number]]  .Type:  Patient Requesting Call    Who Called:SHAGGY JASON [[id number]]  Does the patient know what this is regarding?:Patient needs to schedule a hospital follow up  Would the patient rather a call back or a response via [facility]? Call back  Best Call Back Number:.[phone] ([place])    Additional Information:

## 2025-01-19 ENCOUNTER — TELEPHONE (OUTPATIENT)
Dept: UROLOGY | Facility: CLINIC | Age: 70
End: 2025-01-19
Payer: MEDICARE

## 2025-01-21 ENCOUNTER — PATIENT OUTREACH (OUTPATIENT)
Dept: ADMINISTRATIVE | Facility: HOSPITAL | Age: 70
End: 2025-01-21
Payer: MEDICARE

## 2025-01-21 NOTE — PROGRESS NOTES
RESCHEDULED APPT FROM CLINIC CLOSURE SNOW DAYS- spoke to pt rescheduled his follow up, next avail was Feb 6 2025 with EDDI DONIS.

## 2025-01-22 DIAGNOSIS — I25.118 CORONARY ARTERY DISEASE OF NATIVE ARTERY OF NATIVE HEART WITH STABLE ANGINA PECTORIS: ICD-10-CM

## 2025-01-24 DIAGNOSIS — N18.2 STAGE 2 CHRONIC KIDNEY DISEASE: Primary | ICD-10-CM

## 2025-01-24 RX ORDER — APIXABAN 5 MG/1
TABLET, FILM COATED ORAL
Qty: 180 TABLET | Refills: 3 | Status: SHIPPED | OUTPATIENT
Start: 2025-01-24

## 2025-01-27 DIAGNOSIS — Z79.4 TYPE 2 DIABETES MELLITUS WITH STAGE 3A CHRONIC KIDNEY DISEASE, WITH LONG-TERM CURRENT USE OF INSULIN: Primary | ICD-10-CM

## 2025-01-27 DIAGNOSIS — N18.31 TYPE 2 DIABETES MELLITUS WITH STAGE 3A CHRONIC KIDNEY DISEASE, WITH LONG-TERM CURRENT USE OF INSULIN: Primary | ICD-10-CM

## 2025-01-27 DIAGNOSIS — E11.22 TYPE 2 DIABETES MELLITUS WITH STAGE 3A CHRONIC KIDNEY DISEASE, WITH LONG-TERM CURRENT USE OF INSULIN: Primary | ICD-10-CM

## 2025-01-28 ENCOUNTER — LAB VISIT (OUTPATIENT)
Dept: LAB | Facility: HOSPITAL | Age: 70
End: 2025-01-28
Attending: INTERNAL MEDICINE
Payer: MEDICARE

## 2025-01-28 ENCOUNTER — OFFICE VISIT (OUTPATIENT)
Dept: NEPHROLOGY | Facility: CLINIC | Age: 70
End: 2025-01-28
Payer: MEDICARE

## 2025-01-28 VITALS
DIASTOLIC BLOOD PRESSURE: 62 MMHG | BODY MASS INDEX: 29.49 KG/M2 | WEIGHT: 177 LBS | HEART RATE: 72 BPM | SYSTOLIC BLOOD PRESSURE: 120 MMHG | HEIGHT: 65 IN | RESPIRATION RATE: 18 BRPM

## 2025-01-28 DIAGNOSIS — N18.31 TYPE 2 DIABETES MELLITUS WITH STAGE 3A CHRONIC KIDNEY DISEASE, WITH LONG-TERM CURRENT USE OF INSULIN: ICD-10-CM

## 2025-01-28 DIAGNOSIS — E11.22 TYPE 2 DIABETES MELLITUS WITH STAGE 3A CHRONIC KIDNEY DISEASE, WITH LONG-TERM CURRENT USE OF INSULIN: ICD-10-CM

## 2025-01-28 DIAGNOSIS — N28.9 RENAL INSUFFICIENCY: ICD-10-CM

## 2025-01-28 DIAGNOSIS — Z79.4 TYPE 2 DIABETES MELLITUS WITH STAGE 3A CHRONIC KIDNEY DISEASE, WITH LONG-TERM CURRENT USE OF INSULIN: ICD-10-CM

## 2025-01-28 DIAGNOSIS — N18.30 STAGE 3 CHRONIC KIDNEY DISEASE, UNSPECIFIED WHETHER STAGE 3A OR 3B CKD: Primary | ICD-10-CM

## 2025-01-28 PROCEDURE — 3078F DIAST BP <80 MM HG: CPT | Mod: CPTII,S$GLB,, | Performed by: INTERNAL MEDICINE

## 2025-01-28 PROCEDURE — 3288F FALL RISK ASSESSMENT DOCD: CPT | Mod: CPTII,S$GLB,, | Performed by: INTERNAL MEDICINE

## 2025-01-28 PROCEDURE — 3008F BODY MASS INDEX DOCD: CPT | Mod: CPTII,S$GLB,, | Performed by: INTERNAL MEDICINE

## 2025-01-28 PROCEDURE — 1125F AMNT PAIN NOTED PAIN PRSNT: CPT | Mod: CPTII,S$GLB,, | Performed by: INTERNAL MEDICINE

## 2025-01-28 PROCEDURE — 1159F MED LIST DOCD IN RCRD: CPT | Mod: CPTII,S$GLB,, | Performed by: INTERNAL MEDICINE

## 2025-01-28 PROCEDURE — 99999 PR PBB SHADOW E&M-EST. PATIENT-LVL V: CPT | Mod: PBBFAC,,, | Performed by: INTERNAL MEDICINE

## 2025-01-28 PROCEDURE — 3074F SYST BP LT 130 MM HG: CPT | Mod: CPTII,S$GLB,, | Performed by: INTERNAL MEDICINE

## 2025-01-28 PROCEDURE — 3066F NEPHROPATHY DOC TX: CPT | Mod: CPTII,S$GLB,, | Performed by: INTERNAL MEDICINE

## 2025-01-28 PROCEDURE — 99205 OFFICE O/P NEW HI 60 MIN: CPT | Mod: S$GLB,,, | Performed by: INTERNAL MEDICINE

## 2025-01-28 PROCEDURE — 1101F PT FALLS ASSESS-DOCD LE1/YR: CPT | Mod: CPTII,S$GLB,, | Performed by: INTERNAL MEDICINE

## 2025-01-28 NOTE — PROGRESS NOTES
Crow Green is a 69 y.o. male for whom nephrology consult has been requested to evaluate and give opinion.   Renal clinic consult note:  Date of consult: 1/28/25  Reason for consult: kidney failure. Abnormal s Cr  Referring physician: Dr. Cm Polanco    HPI: Thank you for referring the pt to us. H/o and chart were reviewed. Pt was seen and examined. Pt is a 70 y/o male with h/o of HTN, DM-2, diastolic CHF, and past drug use who was referred to renal clinic for elevated s Cr. Labs over many years were reviewed. S Cr was normal 1.0 about 9 months ago. Noted Cr suddenly worsened to 2.7 between Oct to Nov 2024. Since then, Cr has remained stable close to 2.0. Multiple episodes of JOSE MARIA also took place in the past alternating wit normal or near normal values. For example, Cr was 3.6 in July 2020. Per h/o provided, noted these s Cr elevations have coincided with pt's use of cocaine.  No other pertinent h/o, noted though dependence on pain killers (narcotics), though not NSAIds. Also, noted pt's report of diarrhea every time he uses cocaine. Noted reports of MI in the past.    Meds reviewed, poor historian. It is not clear what prescribed meds pt is actually taking. Noted ozempic and jardiance on pt's med list.    PAST MEDICAL HISTORY: JOSE MARIA, CKD stage 3, HTN, DM-2 (2000), cocaine use, narcotic use, Arthritis, Asthma, Atrial fibrillation, Carotid artery stenosis, CHF, Chronic obstructive pulmonary disease, Depression, Dermatomyositis, Elevated coronary artery calcium score (02/14/2024), Elevated PSA, Follow up (07/30/2020), Myocardial infarction, and Sleep apnea.    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    SOCIAL HISTORY:  He  reports that he has never smoked. He has never used smokeless tobacco. He reports current alcohol use. He reports that he does not currently use drugs after having used the following drugs: Cocaine.    FAMILY MEDICAL HISTORY:  His family history includes Cancer in his maternal grandfather; Cataracts in his father, mother, and sister; Diabetes in his maternal aunt, mother, and sister; Glaucoma in his maternal aunt, mother, and sister; Hypertension in his mother; No Known Problems in his brother, daughter, and son; Stroke in his father and sister.    Review of patient's allergies indicates:   Allergen Reactions    Protamine Hives     Urticaria, possible upper airway swelling        omalizumab  300 mg Subcutaneous Q28 Days    omalizumab  75 mg Subcutaneous Q28 Days       Prior to Admission medications    Medication Sig Start Date End Date Taking? Authorizing Provider   albuterol (PROVENTIL) 2.5 mg /3 mL (0.083 %) nebulizer solution INHALE THE CONTENTS OF 1 VIAL VIA NEBULIZER EVERY 4 TO 6 HOURS AS NEEDED FOR WHEEZING OR FOR SHORTNESS OF BREATH 1/12/23  Yes Juancho Alicea MD   albuterol sulfate 90 mcg/actuation aebs Inhale 2 puffs into the lungs every 4 (four) hours as needed (WHEEZING OR SHORTNESS OF BREATH).   Yes Provider, Historical   allopurinoL (ZYLOPRIM) 100 MG tablet TAKE 1 TABLET ONE TIME DAILY 7/23/24  Yes Lakeisha Pastor PA-C   amLODIPine (NORVASC) 2.5 MG tablet Take 1 tablet (2.5 mg total) by mouth once daily. 12/3/24 12/3/25 Yes Lakeisha Pastor PA-C   atorvastatin (LIPITOR) 40 MG tablet Take 1 tablet (40 mg total) by mouth every evening. 9/29/23  Yes Lakeisha Pastor PA-C   blood sugar  diagnostic (TRUE METRIX GLUCOSE TEST STRIP) Strp Inject 1 each into the skin 4 (four) times daily. 3/8/24  Yes Lia Henriquez NP   blood-glucose meter Misc Use as instructed to test blood glucose meter daily. 1/18/24  Yes Tricia Estes NP   brinzolamide-brimonidine (SIMBRINZA) 1-0.2 % DrpS Place 1 drop into both eyes 3 (three) times daily. 1/18/23  Yes Gio Mitchell MD   cyanocobalamin (VITAMIN B-12) 1000 MCG tablet Take 1 tablet by mouth once daily. 1/1/24  Yes Provider, Historical   DEXCOM G7 SENSOR Shefali Change sensor every 10 days 12/16/24  Yes Eduardo Castillo PA-C   ELIQUIS 5 mg Tab TAKE 1 TABLET TWICE DAILY 1/24/25  Yes Eduard Zhao MD   empagliflozin (JARDIANCE) 25 mg tablet Take 1 tablet (25 mg total) by mouth once daily. 12/16/24  Yes Eduardo Castillo PA-C   furosemide (LASIX) 40 MG tablet Take 40 mg by mouth 2 (two) times daily. 2.5 tablets twice daily   Yes Provider, Historical   gabapentin (NEURONTIN) 300 MG capsule Take 1 capsule (300 mg total) by mouth 3 (three) times daily. 12/20/24 12/20/25 Yes Denis Lockhart MD   isosorbide mononitrate (IMDUR) 30 MG 24 hr tablet Take 1 tablet (30 mg total) by mouth once daily. 11/16/24 11/16/25 Yes Comfort Mtz FNP-C   lancets (TRUEPLUS LANCETS) 33 gauge Misc Use to check blood sugar twice a day 7/13/23  Yes Lakeisha Pastor PA-C   lancets (TRUEPLUS LANCETS) 33 gauge Misc USE AS DIRECTED TO TEST BLOOD SUGAR FOUR TIMES DAILY 3/15/24  Yes Lia Henriquez NP   latanoprost 0.005 % ophthalmic solution Place 1 drop into both eyes every evening. 10/22/24 10/22/25 Yes Gio Mitchell MD   lisinopriL (PRINIVIL,ZESTRIL) 30 MG tablet TAKE 1 TABLET EVERY DAY. 11/15/23  Yes Eduard Zhao MD   methocarbamoL (ROBAXIN) 750 MG Tab Take 750 mg by mouth 2 (two) times daily as needed. 11/6/24  Yes Provider, Historical   multivitamin-iron-folic acid (SENTRY) Tab Take 1 tablet by mouth once daily. 1/1/24  Yes Provider, Historical   omalizumab (XOLAIR) 150  "mg/mL injection Inject 2 mLs (300 mg total) into the skin every 14 (fourteen) days. 12/22/23  Yes Josie Hernandez MD   pantoprazole (PROTONIX) 40 MG tablet Take 1 tablet (40 mg total) by mouth once daily. 4/30/24  Yes Lakeisha Pastor PA-C   pen needle, diabetic (BD ULTRA-FINE SHORT PEN NEEDLE) 31 gauge x 5/16" Ndle 1 each by Misc.(Non-Drug; Combo Route) route 3 (three) times daily. 10/24/22  Yes Eduardo Castillo PA-C   pep injection Inject 0.15 ml as directed     For compounding pharmacy use:   Add PAPAVERINE 30 mg  Add PHENTOLAMINE 1 mg  Add ALPROSTADIL 20 mcg 1/2/24  Yes Jose Dickson MD   ranolazine (RANEXA) 1,000 mg Tb12 TAKE 1 TABLET TWICE DAILY 7/22/24  Yes Eduard Zhao MD   semaglutide (OZEMPIC) 2 mg/dose (8 mg/3 mL) PnIj Inject 2 mg into the skin every 7 days. 12/16/24  Yes Eduardo Castillo PA-C   sertraline (ZOLOFT) 100 MG tablet Take 1 tablet (100 mg total) by mouth every evening. 4/30/24  Yes Lakeisha Pastor PA-C   tamsulosin (FLOMAX) 0.4 mg Cap Take 1 capsule (0.4 mg total) by mouth once daily. 12/21/24 12/21/25 Yes Denis Lockhart MD   topiramate (TOPAMAX) 25 MG tablet Take 1 tablet (25 mg total) by mouth 2 (two) times daily. 11/12/24 2/10/25 Yes Rasheed Abreu NP   traMADoL (ULTRAM) 50 mg tablet Take 50 mg by mouth every 6 (six) hours as needed. 10/16/24  Yes Provider, Historical   traZODone (DESYREL) 100 MG tablet TAKE 2 TABLETS EVERY EVENING 12/12/23  Yes Cm Polanco MD   VIOS AEROSOL DELIVERY SYSTEM Shefali USE AS DIRESTED 11/4/19  Yes Provider, Historical   EPINEPHrine (EPIPEN) 0.3 mg/0.3 mL AtIn Inject 0.3 mLs (0.3 mg total) into the muscle once. for 1 dose 12/22/23 12/31/24  Josie Hernandez MD   INV allopurinol tablet Take 1 tablet by mouth once daily.  Patient not taking: Reported on 1/28/2025 7/12/23   Provider, Historical   omalizumab (XOLAIR) 75 mg/0.5 mL injection Inject 0.5 mLs (75 mg total) into the skin every 14 (fourteen) days.  Patient not taking: Reported " "on 1/28/2025 12/22/23   Josie Hernandez MD   diclofenac sodium (VOLTAREN) 1 % Gel APPLY 2 GRAMS TOPICALLY FOUR TIMES DAILY  Patient not taking: Reported on 10/11/2024 8/25/21 3/5/22  Cisco Luciano MD        REVIEW OF SYSTEMS:  Patient has no fever, fatigue, visual changes, chest pain, edema, cough, dyspnea, nausea, vomiting, constipation, diarrhea, arthralgias, pruritis, dizziness, weakness, depression, confusion.      PHYSICAL EXAM:   height is 5' 5" (1.651 m) and weight is 80.3 kg (177 lb 0.5 oz). His blood pressure is 120/62 and his pulse is 72. His respiration is 18.   Gen: WDWN male in no apparent distress  Psych: Normal mood and affect  Skin: No rashes or ulcers  Neck: No JVD  Chest: Clear with no rales, rhonchi, wheezing with normal effort  CV: Regular with no murmurs, gallops or rubs  Abd: Soft, nontender, no distension, positive bowel sounds  Ext: No edema    Labs reviewed  BMP  Lab Results   Component Value Date     01/28/2025    K 4.4 01/28/2025     01/28/2025    CO2 20 (L) 01/28/2025    BUN 41 (H) 01/28/2025    CREATININE 2.2 (H) 01/28/2025    CALCIUM 8.8 01/28/2025    ANIONGAP 12 01/28/2025    EGFRNORACEVR 32 (A) 01/28/2025     Lab Results   Component Value Date    WBC 7.61 01/28/2025    HGB 12.6 (L) 01/28/2025    HCT 39.6 (L) 01/28/2025    MCV 91 01/28/2025     01/28/2025     Lab Results   Component Value Date    URICACID 6.7 11/08/2024     U/a: 1+ protein, neg blood  Urine microalb/Cr 470  PSA 3.7      IMPRESSION AND RECOMMENDATIONS: 68 y/o male with past s Cr fluctuations and more recent steady and stable elevation in Cr presented for evaluation:    Renal: s Cr rise in the past was more episodic correlating with cocaine use  More recently, s Cr is no longer improving back to normal level and has stayed elevated  Cocaine is a vasoconstrictor with ability to cause hypoxia to the end organ, and its chronic use can lead to irreversible, chronic disease  No change in Cr since Nov " 2024.  Has CKD stage 3. Stable renal function at present  H/o of DM and HTN  Proteinuria   Diabetic nephropathy and HTn also likely contributing to CKD    K normal  Na normal  Metabolic acidosis, mild  Ca normal    Pt was advised that further use of cocaine will lead to greater loss of kidney function    2. HTN: BP controlled at present  Meds reviewed  Continue lisinopril    3. DM-2: reviewed the chart  Noted on jardiance and ozempic  Agree with the use of these meds at present, but if pt continues to do self-harm by using cocaine, then the use of these cardiac and renal protective agents should be questioned,    4. Cardiac: MI in the past most likely from coronary constrictive effect of cocaine  CHF h/o.    5. Drug use: cocaine and narcotics  Risks of dying discussed with pt.  Noted diarrhea with cocaine. GI losses liekly exacerbating JOSE MARIA.    Plans and recommendations:  As discussed above  Total time spent 60 minutes including time needed to review the records, the   patient evaluation, documentation, face-to-face discussion with the patient,   more than 50% of the time was spent on coordination of care and counseling.    Level V visit.  RTC 3 months    Jamel Eller MD

## 2025-01-31 ENCOUNTER — TELEPHONE (OUTPATIENT)
Dept: INTERNAL MEDICINE | Facility: CLINIC | Age: 70
End: 2025-01-31
Payer: MEDICARE

## 2025-01-31 NOTE — TELEPHONE ENCOUNTER
Tried calling the patient at the following number [phone] I was informed that this is not the patient number. I spoke with Jessica at [facility] and was informed that the patient received the oxygen in 2023 ordered by Dr Ron Vazquez. The patient will need a letter from the provider stating the patient can be discontinue from oxygen.

## 2025-01-31 NOTE — TELEPHONE ENCOUNTER
----- Message from Alejandra sent at 1/31/2025  1:52 PM CST -----  Contact: Crow Maria is needing someone to come and  his oxygen equipment. The doctor need to sign off on the paper work in for the . Please call [phone] / [phone]. The patient would like a call back one the authorization is done. [phone] or as of Monday the patient can be reached at [phone].

## 2025-02-04 ENCOUNTER — OFFICE VISIT (OUTPATIENT)
Dept: PSYCHIATRY | Facility: CLINIC | Age: 70
End: 2025-02-04
Payer: MEDICARE

## 2025-02-04 VITALS
WEIGHT: 181.69 LBS | DIASTOLIC BLOOD PRESSURE: 76 MMHG | BODY MASS INDEX: 30.23 KG/M2 | SYSTOLIC BLOOD PRESSURE: 133 MMHG | HEART RATE: 84 BPM

## 2025-02-04 DIAGNOSIS — F32.A CHRONIC DEPRESSION: ICD-10-CM

## 2025-02-04 PROCEDURE — 90792 PSYCH DIAG EVAL W/MED SRVCS: CPT | Mod: S$GLB,,, | Performed by: PSYCHIATRY & NEUROLOGY

## 2025-02-04 PROCEDURE — 3066F NEPHROPATHY DOC TX: CPT | Mod: CPTII,S$GLB,, | Performed by: PSYCHIATRY & NEUROLOGY

## 2025-02-04 PROCEDURE — 3078F DIAST BP <80 MM HG: CPT | Mod: CPTII,S$GLB,, | Performed by: PSYCHIATRY & NEUROLOGY

## 2025-02-04 PROCEDURE — 99999 PR PBB SHADOW E&M-EST. PATIENT-LVL II: CPT | Mod: PBBFAC,,, | Performed by: PSYCHIATRY & NEUROLOGY

## 2025-02-04 PROCEDURE — 3075F SYST BP GE 130 - 139MM HG: CPT | Mod: CPTII,S$GLB,, | Performed by: PSYCHIATRY & NEUROLOGY

## 2025-02-04 RX ORDER — TRAZODONE HYDROCHLORIDE 150 MG/1
TABLET ORAL
Qty: 180 TABLET | Refills: 1 | Status: SHIPPED | OUTPATIENT
Start: 2025-02-04
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
## 2025-02-18 ENCOUNTER — TELEPHONE (OUTPATIENT)
Facility: CLINIC | Age: 70
End: 2025-02-18
Payer: MEDICARE

## 2025-02-18 ENCOUNTER — TELEPHONE (OUTPATIENT)
Dept: PULMONOLOGY | Facility: CLINIC | Age: 70
End: 2025-02-18
Payer: MEDICARE

## 2025-02-18 ENCOUNTER — DOCUMENT SCAN (OUTPATIENT)
Dept: HOME HEALTH SERVICES | Facility: HOSPITAL | Age: 70
End: 2025-02-18
Payer: MEDICARE

## 2025-02-18 DIAGNOSIS — G43.E19 INTRACTABLE CHRONIC MIGRAINE WITH AURA AND WITHOUT STATUS MIGRAINOSUS: ICD-10-CM

## 2025-02-18 NOTE — TELEPHONE ENCOUNTER
Called pt to confirm appt. Pt asked to reschedule due to transportation. I verbalized understanding and rescheduled. Told the pt that I would message the [facility] dept staff to call him to reschedule since he is due to see them tomorrow. He verbalized understanding.     Sent message to dept.

## 2025-02-18 NOTE — TELEPHONE ENCOUNTER
----- Message from Nurse Sampson sent at 2/18/2025 12:12 PM CST -----  Regarding: FW: Reschedule    ----- Message -----  From: Allison Ba MA  Sent: 2/18/2025  10:28 AM CST  To: [facility] Clinical Staff  Subject: Reschedule                                       Good morning, I spoke with the patient to confirm his visit with us tomorrow and reminded him of his visit with y'all as well. The patient is needing to reschedule his visits due to transportation. Please reach out to patient to reschedule. Have a great day!Thanks, MARY GRACE Cooper

## 2025-02-18 NOTE — TELEPHONE ENCOUNTER
Chronic Disease Management:   Called patient to confirm Pulmonary Disease Management appointment.   Patient requests to reschedule due to changing insurances and transportation planning. Rescheduled.

## 2025-02-18 NOTE — TELEPHONE ENCOUNTER
No care due was identified.  [facility] Embedded Care Due Messages. Reference number: [id number].   2/18/2025 3:17:33 PM CST

## 2025-02-19 RX ORDER — METHOCARBAMOL 750 MG/1
TABLET, FILM COATED ORAL
Qty: 270 TABLET | Refills: 0 | OUTPATIENT
Start: 2025-02-19

## 2025-02-19 RX ORDER — ISOSORBIDE MONONITRATE 30 MG/1
TABLET, EXTENDED RELEASE ORAL
Qty: 90 TABLET | Refills: 0 | OUTPATIENT
Start: 2025-02-19

## 2025-02-19 RX ORDER — CHOLECALCIFEROL (VITAMIN D3) 25 MCG
TABLET,CHEWABLE ORAL
Qty: 90 CAPSULE | Refills: 0 | OUTPATIENT
Start: 2025-02-19

## 2025-02-19 RX ORDER — TAMSULOSIN HYDROCHLORIDE 0.4 MG/1
CAPSULE ORAL
Qty: 90 CAPSULE | Refills: 0 | OUTPATIENT
Start: 2025-02-19

## 2025-02-19 RX ORDER — TOPIRAMATE 25 MG/1
TABLET ORAL
Qty: 180 TABLET | Refills: 0 | OUTPATIENT
Start: 2025-02-19

## 2025-02-20 ENCOUNTER — EXTERNAL HOME HEALTH (OUTPATIENT)
Dept: HOME HEALTH SERVICES | Facility: HOSPITAL | Age: 70
End: 2025-02-20
Payer: MEDICARE

## 2025-03-13 ENCOUNTER — PATIENT MESSAGE (OUTPATIENT)
Dept: RHEUMATOLOGY | Facility: CLINIC | Age: 70
End: 2025-03-13
Payer: MEDICARE

## 2025-03-18 ENCOUNTER — TELEPHONE (OUTPATIENT)
Dept: INTERNAL MEDICINE | Facility: CLINIC | Age: 70
End: 2025-03-18
Payer: MEDICARE

## 2025-03-18 NOTE — TELEPHONE ENCOUNTER
Spoke with the patient stated that he needs to have Dr Polanco's office fill out some paperwork to receive a spending card from his insurance company. The patient was informed that Dr Polanco's office has not receive any paperwork. The patient stated that he will call the office back due to him being out of town.

## 2025-03-18 NOTE — TELEPHONE ENCOUNTER
Spoke with the patient stated that he was calling to have [facility]  an oxygen tank. The patient stated that he no longer use the tanks. The patient stated that he is out of town and will give Dr Polanco's office a call back.

## 2025-03-18 NOTE — TELEPHONE ENCOUNTER
----- Message from Kimmie sent at 3/18/2025 10:03 AM CDT -----  Contact: Patient, [phone]  .1MEDICALADVICE Patient is calling for Medical Advice regarding: Calling to speak with the nurse regarding restarting Home Health. Also needs orders for the oxygen tanks to be picked up.How long has patient had these symptoms: N/APharmacy name and phone#: N/APatient wants a call back or thru [facility]: Call backComments: Please call him. Thanks.Please advise patient replies from provider may take up to 48 hours.

## 2025-03-18 NOTE — TELEPHONE ENCOUNTER
----- Message from Rosaura sent at 3/17/2025 11:00 AM CDT -----  Type:  Patient Returning CallWhchristopher Called:Jane Left Message for Patient:Does the patient know what this is regarding?:PaperworkWould the patient rather a call back or a response via [facility]? [facility] Back Number:[phone] Information: patient need paperwork faxed over to( [employer] @  [phone]) stating that he has multiple (daignosis)  sent  to be able to get his Health Spending Card. Please call patient once it is faxed.

## 2025-03-24 DIAGNOSIS — Z00.00 ENCOUNTER FOR MEDICARE ANNUAL WELLNESS EXAM: ICD-10-CM

## 2025-04-08 ENCOUNTER — TELEPHONE (OUTPATIENT)
Dept: INTERNAL MEDICINE | Facility: CLINIC | Age: 70
End: 2025-04-08
Payer: MEDICARE

## 2025-04-08 NOTE — TELEPHONE ENCOUNTER
----- Message from Sophia Lund sent at 4/8/2025  3:29 PM CDT -----  Contact: Gemma flores/[facility]  .Type:  Patient Requesting ReferralWho Called: [facility]ma flores/[facility]Rosie the patient already have the specialty appointment scheduled?: Yes He's been seenIf yes, what is the date of that appointment?: Beginning 3/24Referral to What Specialty: Wound CareReason for Referral: Left FootDoes the patient want the referral with a specific physician?: Laci the specialist [facility] or [facility]?: [facility] Requesting a Response?: Provider is requesting responseWould the patient rather a call back or a response via [facility]? Call BackBest Call Back Number:..[phone] Information: Please Contact Gemma with [facility] [phone]

## 2025-04-08 NOTE — TELEPHONE ENCOUNTER
Spoke with Gemma at [facility] stated that the patient is receiving wound care from their organization and would like to continue treatment to the patient. Gemma will try and fax over information concerning patient care. Dr. Polanco's fax# was given [phone].

## 2025-04-29 ENCOUNTER — TELEPHONE (OUTPATIENT)
Dept: NEPHROLOGY | Facility: CLINIC | Age: 70
End: 2025-04-29
Payer: MEDICARE

## 2025-04-29 NOTE — TELEPHONE ENCOUNTER
"Called both listed numbers. No vm. The second number said they dont know where he is." L/m 4/29/25/sf   "

## 2025-05-10 DIAGNOSIS — M1A.09X0 CHRONIC GOUT OF MULTIPLE SITES, UNSPECIFIED CAUSE: ICD-10-CM

## 2025-05-12 RX ORDER — ALLOPURINOL 100 MG/1
100 TABLET ORAL
Qty: 90 TABLET | Refills: 3 | Status: SHIPPED | OUTPATIENT
Start: 2025-05-12

## 2025-05-21 ENCOUNTER — OFFICE VISIT (OUTPATIENT)
Dept: INTERNAL MEDICINE | Facility: CLINIC | Age: 70
End: 2025-05-21
Payer: MEDICARE

## 2025-05-21 ENCOUNTER — LAB VISIT (OUTPATIENT)
Dept: LAB | Facility: HOSPITAL | Age: 70
End: 2025-05-21
Attending: INTERNAL MEDICINE
Payer: MEDICARE

## 2025-05-21 VITALS
HEART RATE: 82 BPM | WEIGHT: 188.5 LBS | HEIGHT: 65 IN | SYSTOLIC BLOOD PRESSURE: 132 MMHG | OXYGEN SATURATION: 96 % | TEMPERATURE: 97 F | BODY MASS INDEX: 31.4 KG/M2 | DIASTOLIC BLOOD PRESSURE: 66 MMHG

## 2025-05-21 DIAGNOSIS — E11.22 TYPE 2 DIABETES MELLITUS WITH STAGE 3A CHRONIC KIDNEY DISEASE, WITH LONG-TERM CURRENT USE OF INSULIN: ICD-10-CM

## 2025-05-21 DIAGNOSIS — N18.31 TYPE 2 DIABETES MELLITUS WITH STAGE 3A CHRONIC KIDNEY DISEASE, WITH LONG-TERM CURRENT USE OF INSULIN: ICD-10-CM

## 2025-05-21 DIAGNOSIS — I50.42 CHRONIC COMBINED SYSTOLIC AND DIASTOLIC HEART FAILURE: ICD-10-CM

## 2025-05-21 DIAGNOSIS — E11.69 HYPERLIPIDEMIA ASSOCIATED WITH TYPE 2 DIABETES MELLITUS: ICD-10-CM

## 2025-05-21 DIAGNOSIS — I10 ESSENTIAL HYPERTENSION: ICD-10-CM

## 2025-05-21 DIAGNOSIS — F19.11 DRUG ABUSE IN REMISSION: Primary | ICD-10-CM

## 2025-05-21 DIAGNOSIS — E78.5 HYPERLIPIDEMIA ASSOCIATED WITH TYPE 2 DIABETES MELLITUS: ICD-10-CM

## 2025-05-21 DIAGNOSIS — K21.9 GASTROESOPHAGEAL REFLUX DISEASE WITHOUT ESOPHAGITIS: ICD-10-CM

## 2025-05-21 DIAGNOSIS — N18.30 STAGE 3 CHRONIC KIDNEY DISEASE, UNSPECIFIED WHETHER STAGE 3A OR 3B CKD: Primary | ICD-10-CM

## 2025-05-21 DIAGNOSIS — Z79.4 TYPE 2 DIABETES MELLITUS WITH STAGE 3A CHRONIC KIDNEY DISEASE, WITH LONG-TERM CURRENT USE OF INSULIN: ICD-10-CM

## 2025-05-21 DIAGNOSIS — Z99.81 CHRONIC RESPIRATORY FAILURE WITH HYPOXIA, ON HOME O2 THERAPY: ICD-10-CM

## 2025-05-21 DIAGNOSIS — M47.26 OSTEOARTHRITIS OF SPINE WITH RADICULOPATHY, LUMBAR REGION: ICD-10-CM

## 2025-05-21 DIAGNOSIS — N18.30 STAGE 3 CHRONIC KIDNEY DISEASE, UNSPECIFIED WHETHER STAGE 3A OR 3B CKD: ICD-10-CM

## 2025-05-21 DIAGNOSIS — M54.16 LUMBAR RADICULOPATHY: ICD-10-CM

## 2025-05-21 DIAGNOSIS — F41.1 GAD (GENERALIZED ANXIETY DISORDER): ICD-10-CM

## 2025-05-21 DIAGNOSIS — J96.11 CHRONIC RESPIRATORY FAILURE WITH HYPOXIA, ON HOME O2 THERAPY: ICD-10-CM

## 2025-05-21 DIAGNOSIS — E11.65 UNCONTROLLED TYPE 2 DIABETES MELLITUS WITH HYPERGLYCEMIA: ICD-10-CM

## 2025-05-21 DIAGNOSIS — F33.1 MDD (MAJOR DEPRESSIVE DISORDER), RECURRENT EPISODE, MODERATE: ICD-10-CM

## 2025-05-21 DIAGNOSIS — I48.0 PAF (PAROXYSMAL ATRIAL FIBRILLATION): Chronic | ICD-10-CM

## 2025-05-21 DIAGNOSIS — J45.40 MODERATE PERSISTENT EXTRINSIC ASTHMA WITHOUT COMPLICATION: ICD-10-CM

## 2025-05-21 DIAGNOSIS — G43.E19 INTRACTABLE CHRONIC MIGRAINE WITH AURA AND WITHOUT STATUS MIGRAINOSUS: ICD-10-CM

## 2025-05-21 LAB
ABSOLUTE EOSINOPHIL (OHS): 0.17 K/UL
ABSOLUTE MONOCYTE (OHS): 0.51 K/UL (ref 0.3–1)
ABSOLUTE NEUTROPHIL COUNT (OHS): 4.21 K/UL (ref 1.8–7.7)
ALBUMIN SERPL BCP-MCNC: 2.7 G/DL (ref 3.5–5.2)
ANION GAP (OHS): 10 MMOL/L (ref 8–16)
BACTERIA #/AREA URNS HPF: NORMAL /HPF
BASOPHILS # BLD AUTO: 0 K/UL
BASOPHILS NFR BLD AUTO: 0 %
BILIRUB UR QL STRIP.AUTO: NEGATIVE
BNP SERPL-MCNC: 136 PG/ML (ref 0–99)
BUN SERPL-MCNC: 27 MG/DL (ref 8–23)
CALCIUM SERPL-MCNC: 8.2 MG/DL (ref 8.7–10.5)
CHLORIDE SERPL-SCNC: 105 MMOL/L (ref 95–110)
CHOLEST SERPL-MCNC: 172 MG/DL (ref 120–199)
CHOLEST/HDLC SERPL: 3.2 {RATIO} (ref 2–5)
CLARITY UR: CLEAR
CO2 SERPL-SCNC: 26 MMOL/L (ref 23–29)
COLOR UR AUTO: YELLOW
CREAT SERPL-MCNC: 1.6 MG/DL (ref 0.5–1.4)
CREAT UR-MCNC: 44 MG/DL (ref 23–375)
EAG (OHS): 140 MG/DL (ref 68–131)
ERYTHROCYTE [DISTWIDTH] IN BLOOD BY AUTOMATED COUNT: 17.3 % (ref 11.5–14.5)
GFR SERPLBLD CREATININE-BSD FMLA CKD-EPI: 47 ML/MIN/1.73/M2
GLUCOSE SERPL-MCNC: 125 MG/DL (ref 70–110)
GLUCOSE UR QL STRIP: ABNORMAL
HBA1C MFR BLD: 6.5 % (ref 4–5.6)
HCT VFR BLD AUTO: 33.2 % (ref 40–54)
HDLC SERPL-MCNC: 53 MG/DL (ref 40–75)
HDLC SERPL: 30.8 % (ref 20–50)
HGB BLD-MCNC: 10 GM/DL (ref 14–18)
HGB UR QL STRIP: NEGATIVE
HYALINE CASTS #/AREA URNS LPF: 0 /LPF (ref 0–1)
IMM GRANULOCYTES # BLD AUTO: 0.01 K/UL (ref 0–0.04)
IMM GRANULOCYTES NFR BLD AUTO: 0.1 % (ref 0–0.5)
KETONES UR QL STRIP: NEGATIVE
LDLC SERPL CALC-MCNC: 102.4 MG/DL (ref 63–159)
LEUKOCYTE ESTERASE UR QL STRIP: NEGATIVE
LYMPHOCYTES # BLD AUTO: 1.8 K/UL (ref 1–4.8)
MCH RBC QN AUTO: 26 PG (ref 27–31)
MCHC RBC AUTO-ENTMCNC: 30.1 G/DL (ref 32–36)
MCV RBC AUTO: 86 FL (ref 82–98)
MICROSCOPIC COMMENT: NORMAL
NITRITE UR QL STRIP: NEGATIVE
NONHDLC SERPL-MCNC: 119 MG/DL
NUCLEATED RBC (/100WBC) (OHS): 0 /100 WBC
PH UR STRIP: 6 [PH]
PHOSPHATE SERPL-MCNC: 3.9 MG/DL (ref 2.7–4.5)
PLATELET # BLD AUTO: 201 K/UL (ref 150–450)
PMV BLD AUTO: ABNORMAL FL
POTASSIUM SERPL-SCNC: 3.8 MMOL/L (ref 3.5–5.1)
PROT UR QL STRIP: ABNORMAL
PROT UR-MCNC: 111 MG/DL
PROT/CREAT UR: 2.52 MG/G{CREAT}
PTH-INTACT SERPL-MCNC: 144.7 PG/ML (ref 9–77)
RBC # BLD AUTO: 3.85 M/UL (ref 4.6–6.2)
RBC #/AREA URNS HPF: 1 /HPF (ref 0–4)
RELATIVE EOSINOPHIL (OHS): 2.5 %
RELATIVE LYMPHOCYTE (OHS): 26.9 % (ref 18–48)
RELATIVE MONOCYTE (OHS): 7.6 % (ref 4–15)
RELATIVE NEUTROPHIL (OHS): 62.9 % (ref 38–73)
SODIUM SERPL-SCNC: 141 MMOL/L (ref 136–145)
SP GR UR STRIP: 1.02
SQUAMOUS #/AREA URNS HPF: 1 /HPF
TRIGL SERPL-MCNC: 83 MG/DL (ref 30–150)
TSH SERPL-ACNC: 1.47 UIU/ML (ref 0.4–4)
WBC # BLD AUTO: 6.7 K/UL (ref 3.9–12.7)
WBC #/AREA URNS HPF: 1 /HPF (ref 0–5)
YEAST URNS QL MICRO: NORMAL /HPF

## 2025-05-21 PROCEDURE — 3008F BODY MASS INDEX DOCD: CPT | Mod: CPTII,HCNC,S$GLB, | Performed by: NURSE PRACTITIONER

## 2025-05-21 PROCEDURE — 3075F SYST BP GE 130 - 139MM HG: CPT | Mod: CPTII,HCNC,S$GLB, | Performed by: NURSE PRACTITIONER

## 2025-05-21 PROCEDURE — 83880 ASSAY OF NATRIURETIC PEPTIDE: CPT | Mod: HCNC

## 2025-05-21 PROCEDURE — 83036 HEMOGLOBIN GLYCOSYLATED A1C: CPT | Mod: HCNC

## 2025-05-21 PROCEDURE — 83970 ASSAY OF PARATHORMONE: CPT | Mod: HCNC

## 2025-05-21 PROCEDURE — 99215 OFFICE O/P EST HI 40 MIN: CPT | Mod: HCNC,S$GLB,, | Performed by: NURSE PRACTITIONER

## 2025-05-21 PROCEDURE — 82570 ASSAY OF URINE CREATININE: CPT | Mod: HCNC

## 2025-05-21 PROCEDURE — 1159F MED LIST DOCD IN RCRD: CPT | Mod: CPTII,HCNC,S$GLB, | Performed by: NURSE PRACTITIONER

## 2025-05-21 PROCEDURE — 3078F DIAST BP <80 MM HG: CPT | Mod: CPTII,HCNC,S$GLB, | Performed by: NURSE PRACTITIONER

## 2025-05-21 PROCEDURE — G2211 COMPLEX E/M VISIT ADD ON: HCPCS | Mod: HCNC,S$GLB,, | Performed by: NURSE PRACTITIONER

## 2025-05-21 PROCEDURE — 81001 URINALYSIS AUTO W/SCOPE: CPT | Mod: HCNC

## 2025-05-21 PROCEDURE — 85025 COMPLETE CBC W/AUTO DIFF WBC: CPT | Mod: HCNC

## 2025-05-21 PROCEDURE — 80069 RENAL FUNCTION PANEL: CPT | Mod: HCNC

## 2025-05-21 PROCEDURE — 84443 ASSAY THYROID STIM HORMONE: CPT | Mod: HCNC

## 2025-05-21 PROCEDURE — 99999 PR PBB SHADOW E&M-EST. PATIENT-LVL V: CPT | Mod: PBBFAC,HCNC,, | Performed by: NURSE PRACTITIONER

## 2025-05-21 PROCEDURE — 3066F NEPHROPATHY DOC TX: CPT | Mod: CPTII,HCNC,S$GLB, | Performed by: NURSE PRACTITIONER

## 2025-05-21 PROCEDURE — 36415 COLL VENOUS BLD VENIPUNCTURE: CPT | Mod: HCNC

## 2025-05-21 PROCEDURE — 1125F AMNT PAIN NOTED PAIN PRSNT: CPT | Mod: CPTII,HCNC,S$GLB, | Performed by: NURSE PRACTITIONER

## 2025-05-21 PROCEDURE — 80061 LIPID PANEL: CPT | Mod: HCNC

## 2025-05-21 RX ORDER — LISINOPRIL 30 MG/1
30 TABLET ORAL DAILY
Qty: 90 TABLET | Refills: 3 | Status: SHIPPED | OUTPATIENT
Start: 2025-05-21

## 2025-05-21 RX ORDER — FUROSEMIDE 20 MG/1
20 TABLET ORAL DAILY
Qty: 5 TABLET | Refills: 0 | Status: SHIPPED | OUTPATIENT
Start: 2025-05-21 | End: 2025-05-26

## 2025-05-21 RX ORDER — SERTRALINE HYDROCHLORIDE 100 MG/1
100 TABLET, FILM COATED ORAL NIGHTLY
Qty: 90 TABLET | Refills: 4 | Status: SHIPPED | OUTPATIENT
Start: 2025-05-21

## 2025-05-21 RX ORDER — PANTOPRAZOLE SODIUM 40 MG/1
40 TABLET, DELAYED RELEASE ORAL DAILY
Qty: 90 TABLET | Refills: 1 | Status: SHIPPED | OUTPATIENT
Start: 2025-05-21

## 2025-05-21 RX ORDER — ATORVASTATIN CALCIUM 40 MG/1
40 TABLET, FILM COATED ORAL NIGHTLY
Qty: 90 TABLET | Refills: 3 | Status: SHIPPED | OUTPATIENT
Start: 2025-05-21

## 2025-05-21 RX ORDER — QUETIAPINE FUMARATE 50 MG/1
50 TABLET, FILM COATED ORAL NIGHTLY
COMMUNITY
Start: 2025-04-15

## 2025-05-21 RX ORDER — LANCETS
EACH MISCELLANEOUS
Qty: 200 EACH | Refills: 11 | Status: SHIPPED | OUTPATIENT
Start: 2025-05-21

## 2025-05-21 RX ORDER — INSULIN PUMP SYRINGE, 3 ML
EACH MISCELLANEOUS
Qty: 1 EACH | Refills: 0 | Status: SHIPPED | OUTPATIENT
Start: 2025-05-21 | End: 2026-05-21

## 2025-05-21 RX ORDER — RANOLAZINE 1000 MG/1
1000 TABLET, EXTENDED RELEASE ORAL 2 TIMES DAILY
Qty: 180 TABLET | Refills: 3 | Status: SHIPPED | OUTPATIENT
Start: 2025-05-21

## 2025-05-21 RX ORDER — ESCITALOPRAM OXALATE 5 MG/1
5 TABLET ORAL
COMMUNITY
Start: 2025-04-15 | End: 2025-05-21

## 2025-05-21 RX ORDER — TOPIRAMATE 25 MG/1
25 TABLET, FILM COATED ORAL 2 TIMES DAILY
Qty: 180 TABLET | Refills: 2 | Status: SHIPPED | OUTPATIENT
Start: 2025-05-21 | End: 2026-02-15

## 2025-05-21 NOTE — PROGRESS NOTES
Subjective:       Patient ID: Crow Green is a 69 y.o. male.    Chief Complaint: Foot Swelling    HPI    Pt has missed several follow ups due to being in drug rehab for illicit drug use which contained cocaine amongst others x 6 + months. Reports remission  Requesting med refills/specialty follow ups/ home health nurse  Acute -reports foot swelling bilaterally. Is sedentary. No pain associated.   Not monitoring BP or DM #s. Sometimes follows diet  Denies asthma flares, due for follow up  Chf/paf- taking eliquis, denies cp. Sob  Depression- states stable on zoloft no HI/SI. Needs psych follow up    Past Medical History:   Diagnosis Date    Arthritis     Asthma     Atrial fibrillation     Atrial fibrillation with RVR 08/07/2019    Carotid artery stenosis     CHF (congestive heart failure)     Chronic obstructive pulmonary disease 08/05/2020    Depression     Dermatomyositis     Diabetes mellitus, type 2 Diagnosed in 2000    Elevated coronary artery calcium score 02/14/2024    Elevated PSA     Follow up 07/30/2020    Hypertension     Myocardial infarction     2017    Sleep apnea      Past Surgical History:   Procedure Laterality Date    ABLATION OF ARRHYTHMOGENIC FOCUS FOR ATRIAL FIBRILLATION N/A 2/27/2020    Procedure: Ablation atrial fibrillation;  Surgeon: Gio Brown MD;  [facility] [facility];  Service: Cardiology;  Laterality: N/A;  afib, DAMIEN (cx if SR), PVI, PITO, anes, MB, 3 Prep    CHOLECYSTECTOMY      CLOSURE OF WOUND Left 7/1/2020    Procedure: CLOSURE, WOUND;  Surgeon: Barb Veras DPM;  Location: [facility] OR;  Service: Podiatry;  Laterality: Left;    COLONOSCOPY N/A 3/4/2022    Procedure: COLONOSCOPY;  Surgeon: Yolis Freitas MD;  Location: [facility] ENDO;  Service: Endoscopy;  Laterality: N/A;    ESOPHAGOGASTRODUODENOSCOPY N/A 3/4/2022    Procedure: EGD (ESOPHAGOGASTRODUODENOSCOPY);  Surgeon: Yolis Freitas MD;  Location: [facility] ENDO;  Service: Endoscopy;  Laterality: N/A;     INJECTION OF ANESTHETIC AGENT INTO SACROILIAC JOINT Left 3/24/2021    Procedure: Left BLOCK, SACROILIAC JOINT and bilateral rhomboid TPI;  Surgeon: Dillan Tsai MD;  Location: [facility] PAIN MGT;  Service: Pain Management;  Laterality: Left;    INTRALUMINAL GASTROINTESTINAL TRACT IMAGING VIA CAPSULE N/A 3/22/2022    Procedure: IMAGING PROCEDURE, GI TRACT, INTRALUMINAL, VIA CAPSULE;  Surgeon: Justice Martinez RN;  Location: [facility] ENDO;  Service: Endoscopy;  Laterality: N/A;    REVERSE TOTAL SHOULDER ARTHROPLASTY Left 1/10/2022    Procedure: ARTHROPLASTY, SHOULDER, TOTAL, REVERSE;  Surgeon: Anthony Alvarado MD;  Location: [facility] OR;  Service: Orthopedics;  Laterality: Left;  left reverse total shoulder arthroplasty     SELECTIVE INJECTION OF ANESTHETIC AGENT AROUND LUMBAR SPINAL NERVE ROOT BY TRANSFORAMINAL APPROACH Left 6/11/2020    Procedure: BLOCK, SPINAL NERVE ROOT, LUMBAR, SELECTIVE, TRANSFORAMINAL APPROACH Left L4-5, L5-S1 TFESI with RN IV sedation;  Surgeon: Dillan Tsai MD;  Location: [facility] PAIN MGT;  Service: Pain Management;  Laterality: Left;    TOE AMPUTATION Left 6/29/2020    Procedure: AMPUTATION, TOE;  Surgeon: Barb Veras DPM;  Location: [facility] OR;  Service: Podiatry;  Laterality: Left;  great toe     Social History[1]  Review of patient's allergies indicates:   Allergen Reactions    Protamine Hives     Urticaria, possible upper airway swelling     Current Outpatient Medications   Medication Sig    albuterol (PROVENTIL) 2.5 mg /3 mL (0.083 %) nebulizer solution INHALE THE CONTENTS OF 1 VIAL VIA NEBULIZER EVERY 4 TO 6 HOURS AS NEEDED FOR WHEEZING OR FOR SHORTNESS OF BREATH    albuterol sulfate 90 mcg/actuation aebs Inhale 2 puffs into the lungs every 4 (four) hours as needed (WHEEZING OR SHORTNESS OF BREATH).    allopurinoL (ZYLOPRIM) 100 MG tablet TAKE 1 TABLET EVERY DAY    amLODIPine (NORVASC) 2.5 MG tablet Take 1 tablet (2.5 mg total) by mouth once daily.    blood-glucose meter Misc Use as instructed  "to test blood glucose meter daily.    brinzolamide-brimonidine (SIMBRINZA) 1-0.2 % DrpS Place 1 drop into both eyes 3 (three) times daily.    cyanocobalamin (VITAMIN B-12) 1000 MCG tablet Take 1 tablet by mouth once daily.    DEXCOM G7 SENSOR Shefali Change sensor every 10 days    ELIQUIS 5 mg Tab TAKE 1 TABLET TWICE DAILY    empagliflozin (JARDIANCE) 25 mg tablet Take 1 tablet (25 mg total) by mouth once daily.    gabapentin (NEURONTIN) 300 MG capsule Take 1 capsule (300 mg total) by mouth 3 (three) times daily.    INV allopurinol tablet Take 1 tablet by mouth once daily.    isosorbide mononitrate (IMDUR) 30 MG 24 hr tablet Take 1 tablet (30 mg total) by mouth once daily.    latanoprost 0.005 % ophthalmic solution Place 1 drop into both eyes every evening.    methocarbamoL (ROBAXIN) 750 MG Tab Take 750 mg by mouth 2 (two) times daily as needed.    multivitamin-iron-folic acid (SENTRY) Tab Take 1 tablet by mouth once daily.    omalizumab (XOLAIR) 150 mg/mL injection Inject 2 mLs (300 mg total) into the skin every 14 (fourteen) days.    pen needle, diabetic (BD ULTRA-FINE SHORT PEN NEEDLE) 31 gauge x 5/16" Ndle 1 each by Misc.(Non-Drug; Combo Route) route 3 (three) times daily.    pep injection Inject 0.15 ml as directed     For compounding pharmacy use:   Add PAPAVERINE 30 mg  Add PHENTOLAMINE 1 mg  Add ALPROSTADIL 20 mcg    QUEtiapine (SEROQUEL) 50 MG tablet Take 50 mg by mouth every evening.    semaglutide (OZEMPIC) 2 mg/dose (8 mg/3 mL) PnIj Inject 2 mg into the skin every 7 days.    tamsulosin (FLOMAX) 0.4 mg Cap Take 1 capsule (0.4 mg total) by mouth once daily.    traMADoL (ULTRAM) 50 mg tablet Take 50 mg by mouth every 6 (six) hours as needed.    traZODone (DESYREL) 150 MG tablet Take 1 to 2 tablets at bedtime as needed for sleep    VIOS AEROSOL DELIVERY SYSTEM Shefali USE AS DIRESTED    atorvastatin (LIPITOR) 40 MG tablet Take 1 tablet (40 mg total) by mouth every evening.    blood sugar diagnostic Strp To check " BG 3 times daily, to use with insurance preferred meter    blood-glucose meter kit To check BG 3 times daily, to use with insurance preferred meter    EPINEPHrine (EPIPEN) 0.3 mg/0.3 mL AtIn Inject 0.3 mLs (0.3 mg total) into the muscle once. for 1 dose    furosemide (LASIX) 20 MG tablet Take 1 tablet (20 mg total) by mouth once daily. for 5 days    lancets Misc To check BG 3 times daily, to use with insurance preferred meter    lisinopriL (PRINIVIL,ZESTRIL) 30 MG tablet Take 1 tablet (30 mg total) by mouth once daily.    omalizumab (XOLAIR) 75 mg/0.5 mL injection Inject 0.5 mLs (75 mg total) into the skin every 14 (fourteen) days. (Patient not taking: Reported on 5/21/2025)    pantoprazole (PROTONIX) 40 MG tablet Take 1 tablet (40 mg total) by mouth once daily.    ranolazine (RANEXA) 1,000 mg Tb12 Take 1 tablet (1,000 mg total) by mouth 2 (two) times daily.    sertraline (ZOLOFT) 100 MG tablet Take 1 tablet (100 mg total) by mouth every evening.    topiramate (TOPAMAX) 25 MG tablet Take 1 tablet (25 mg total) by mouth 2 (two) times daily.     Current Facility-Administered Medications   Medication    omalizumab injection 300 mg    omalizumab injection 75 mg           Review of Systems   Constitutional:  Negative for activity change, appetite change, chills, diaphoresis, fatigue, fever and unexpected weight change.   HENT:  Negative for congestion, ear pain, postnasal drip, rhinorrhea, sinus pressure, sinus pain, sneezing, sore throat, tinnitus, trouble swallowing and voice change.    Eyes:  Negative for photophobia, pain and visual disturbance.   Respiratory:  Negative for cough, chest tightness, shortness of breath and wheezing.    Cardiovascular:  Positive for leg swelling. Negative for chest pain and palpitations.   Gastrointestinal:  Negative for abdominal distention, abdominal pain, constipation, diarrhea, nausea and vomiting.   Genitourinary:  Negative for decreased urine volume, difficulty urinating, dysuria,  flank pain, frequency, hematuria and urgency.   Musculoskeletal:  Positive for arthralgias and back pain. Negative for joint swelling, neck pain and neck stiffness.   Allergic/Immunologic: Negative for immunocompromised state.   Neurological:  Negative for dizziness, tremors, seizures, syncope, facial asymmetry, speech difficulty, weakness, light-headedness, numbness and headaches.   Hematological:  Negative for adenopathy. Does not bruise/bleed easily.   Psychiatric/Behavioral:  Negative for confusion and sleep disturbance.        Objective:      Physical Exam  Vitals reviewed.   Cardiovascular:      Rate and Rhythm: Normal rate and regular rhythm.      Heart sounds: Normal heart sounds.   Pulmonary:      Effort: Pulmonary effort is normal.      Breath sounds: Normal breath sounds.   Abdominal:      General: Bowel sounds are normal.      Palpations: Abdomen is soft.   Musculoskeletal:      Lumbar back: Tenderness present. Decreased range of motion.      Right lower leg: Edema present.      Left lower leg: Edema present.   Skin:     General: Skin is warm and dry.   Neurological:      Mental Status: He is alert and oriented to person, place, and time.         Assessment:     Vitals:    05/21/25 1012   BP: 132/66   Pulse:    Temp:          1. Drug abuse in remission    2. Type 2 diabetes mellitus with stage 3a chronic kidney disease, with long-term current use of insulin    3. Stage 3 chronic kidney disease, unspecified whether stage 3a or 3b CKD    4. Essential hypertension    5. PAF (paroxysmal atrial fibrillation)    6. Chronic combined systolic and diastolic heart failure    7. DOMINIK (generalized anxiety disorder)    8. MDD (major depressive disorder), recurrent episode, moderate    9. Uncontrolled type 2 diabetes mellitus with hyperglycemia    10. Hyperlipidemia associated with type 2 diabetes mellitus    11. Gastroesophageal reflux disease without esophagitis    12. Moderate persistent extrinsic asthma without  complication    13. Chronic respiratory failure with hypoxia, on home O2 therapy    14. Osteoarthritis of spine with radiculopathy, lumbar region    15. Lumbar radiculopathy    16. Intractable chronic migraine with aura and without status migrainosus        Plan:   Drug abuse in remission    Type 2 diabetes mellitus with stage 3a chronic kidney disease, with long-term current use of insulin  -     Lipid Panel; Future; Expected date: 05/21/2025  -     Hemoglobin A1C; Future; Expected date: 05/21/2025  -     TSH; Future; Expected date: 05/21/2025    Stage 3 chronic kidney disease, unspecified whether stage 3a or 3b CKD    Essential hypertension    PAF (paroxysmal atrial fibrillation)    Chronic combined systolic and diastolic heart failure  -     Ambulatory referral/consult to Home Health; Future; Expected date: 05/22/2025  -     BNP; Future; Expected date: 05/21/2025    DOMINIK (generalized anxiety disorder)  -     sertraline (ZOLOFT) 100 MG tablet; Take 1 tablet (100 mg total) by mouth every evening.  Dispense: 90 tablet; Refill: 4    MDD (major depressive disorder), recurrent episode, moderate  -     sertraline (ZOLOFT) 100 MG tablet; Take 1 tablet (100 mg total) by mouth every evening.  Dispense: 90 tablet; Refill: 4    Uncontrolled type 2 diabetes mellitus with hyperglycemia  -     Ambulatory referral/consult to Home Health; Future; Expected date: 05/22/2025    Hyperlipidemia associated with type 2 diabetes mellitus  -     atorvastatin (LIPITOR) 40 MG tablet; Take 1 tablet (40 mg total) by mouth every evening.  Dispense: 90 tablet; Refill: 3    Gastroesophageal reflux disease without esophagitis  -     pantoprazole (PROTONIX) 40 MG tablet; Take 1 tablet (40 mg total) by mouth once daily.  Dispense: 90 tablet; Refill: 1    Moderate persistent extrinsic asthma without complication    Chronic respiratory failure with hypoxia, on home O2 therapy    Osteoarthritis of spine with radiculopathy, lumbar region    Lumbar  radiculopathy  -     Ambulatory referral/consult to Home Health; Future; Expected date: 05/22/2025    Intractable chronic migraine with aura and without status migrainosus  -     topiramate (TOPAMAX) 25 MG tablet; Take 1 tablet (25 mg total) by mouth 2 (two) times daily.  Dispense: 180 tablet; Refill: 2    Other orders  -     blood-glucose meter kit; To check BG 3 times daily, to use with insurance preferred meter  Dispense: 1 each; Refill: 0  -     lancets Misc; To check BG 3 times daily, to use with insurance preferred meter  Dispense: 200 each; Refill: 11  -     blood sugar diagnostic Strp; To check BG 3 times daily, to use with insurance preferred meter  Dispense: 200 strip; Refill: 0  -     lisinopriL (PRINIVIL,ZESTRIL) 30 MG tablet; Take 1 tablet (30 mg total) by mouth once daily.  Dispense: 90 tablet; Refill: 3  -     ranolazine (RANEXA) 1,000 mg Tb12; Take 1 tablet (1,000 mg total) by mouth 2 (two) times daily.  Dispense: 180 tablet; Refill: 3  -     furosemide (LASIX) 20 MG tablet; Take 1 tablet (20 mg total) by mouth once daily. for 5 days  Dispense: 5 tablet; Refill: 0      Lasix x 3 days, has upcoming kidney function tests today/ has bilateral foot swelling present. Check BNP.  Message sent to neuro, pulm, psych, and dm mgt about missed appts and needing reschedule, upcoming podiatry, rheum, and nephro  Check tsh, lipid, A1c now   Chronic complex care done  Pcp 3 mo sooner if warranted       [1]   Social History  Socioeconomic History    Marital status: Legally     Number of children: 5   Occupational History    Occupation: disabled    Occupation: PT at [employer]    Tobacco Use    Smoking status: Never    Smokeless tobacco: Never   Substance and Sexual Activity    Alcohol use: Yes     Comment: rare, maybe holidays    Drug use: Not Currently     Types: Cocaine    Sexual activity: Not Currently     Partners: Female   Social History Narrative    Utilizing [employer] transportation  which has been unreliable.      Social Drivers of Health     Financial Resource Strain: Low Risk  (12/20/2024)    Overall Financial Resource Strain (CARDIA)     Difficulty of Paying Living Expenses: Not hard at all   Recent Concern: Financial Resource Strain - High Risk (11/13/2024)    Overall Financial Resource Strain (CARDIA)     Difficulty of Paying Living Expenses: Hard   Food Insecurity: No Food Insecurity (12/20/2024)    Hunger Vital Sign     Worried About Running Out of Food in the Last Year: Never true     Ran Out of Food in the Last Year: Never true   Recent Concern: Food Insecurity - Food Insecurity Present (11/13/2024)    Hunger Vital Sign     Worried About Running Out of Food in the Last Year: Often true     Ran Out of Food in the Last Year: Often true   Transportation Needs: No Transportation Needs (12/20/2024)    TRANSPORTATION NEEDS     Transportation : No   Recent Concern: Transportation Needs - Unmet Transportation Needs (11/13/2024)    TRANSPORTATION NEEDS     Transportation : Yes, it has kept me from medical appointments or from getting my medications.   Physical Activity: Inactive (12/7/2022)    Exercise Vital Sign     Days of Exercise per Week: 0 days     Minutes of Exercise per Session: 0 min   Stress: No Stress Concern Present (12/20/2024)    [employer] [place] [employer] - Occupational Stress Questionnaire     Feeling of Stress : Not at all   Recent Concern: Stress - Stress Concern Present (11/13/2024)    [employer] - Occupational Stress Questionnaire     Feeling of Stress : Very much   Housing Stability: Unknown (12/20/2024)    Housing Stability Vital Sign     Unable to Pay for Housing in the Last Year: No     Homeless in the Last Year: No   Recent Concern: Housing Stability - High Risk (11/13/2024)    Housing Stability Vital Sign     Unable to Pay for Housing in the Last Year: Yes     Homeless in the Last Year: No

## 2025-05-22 ENCOUNTER — RESULTS FOLLOW-UP (OUTPATIENT)
Dept: INTERNAL MEDICINE | Facility: CLINIC | Age: 70
End: 2025-05-22

## 2025-05-27 RX ORDER — FUROSEMIDE 20 MG/1
TABLET ORAL
Qty: 5 TABLET | Refills: 0 | OUTPATIENT
Start: 2025-05-27

## 2025-05-27 NOTE — TELEPHONE ENCOUNTER
Quick DC. Duplicate Request-  med pended in previous encounter, awaiting assessment    Refill Authorization Note   Crow Green  is requesting a refill authorization.  Brief Assessment and Rationale for Refill:  Quick Discontinue  Medication Therapy Plan:  DUPLICATE    Medication Reconciliation Completed:  No      Comments:   Pended Medication(s)       Requested Prescriptions     Pending Prescriptions Disp Refills    furosemide (LASIX) 20 MG tablet [Pharmacy Med Name: FUROSEMIDE 20MG TABLETS] 5 tablet 0     Sig: TAKE 1 TABLET(20 MG) BY MOUTH DAILY FOR 5 DAYS        Duplicate Pended Encounter(s)/ Last Prescribed Details: (includes pharmacy & prescriber details)              Note composed:10:03 AM 05/27/2025

## 2025-05-27 NOTE — TELEPHONE ENCOUNTER
Quick DC. Duplicate Request-  med pended in previous encounter, awaiting assessment    Refill Authorization Note   Crow Green  is requesting a refill authorization.  Brief Assessment and Rationale for Refill:  Quick Discontinue  Medication Therapy Plan:  DUPLICATE    Medication Reconciliation Completed:  No      Comments:   Pended Medication(s)       Requested Prescriptions     Pending Prescriptions Disp Refills    furosemide (LASIX) 20 MG tablet [Pharmacy Med Name: FUROSEMIDE 20MG TABLETS] 5 tablet 0     Sig: TAKE 1 TABLET(20 MG) BY MOUTH DAILY FOR 5 DAYS        Duplicate Pended Encounter(s)/ Last Prescribed Details: (includes pharmacy & prescriber details)              Note composed:10:01 AM 05/27/2025

## 2025-05-29 ENCOUNTER — TELEPHONE (OUTPATIENT)
Dept: RHEUMATOLOGY | Facility: CLINIC | Age: 70
End: 2025-05-29
Payer: MEDICARE

## 2025-05-29 NOTE — TELEPHONE ENCOUNTER
Copied from CRM #[id number]. Topic: Appointments - Appointment Access  >> May 29, 2025  3:35 PM Layne wrote:  .Type:  Sooner Apoointment Request    Caller is requesting a sooner appointment.  Caller declined first available appointment listed below.  Caller will not accept being placed on the waitlist and is requesting a message be sent to doctor.  Name of Caller:...Crow Green  When is the first available appointment?  Symptoms:  Would the patient rather a call back or a response via [facility]? Call back  Best Call Back Number:.[phone] or [phone]  Additional Information: pt is calling to reschedule appt he missed.

## 2025-05-29 NOTE — TELEPHONE ENCOUNTER
"[phone] number is "unreachable" . [phone] busy X 3      Patient needs to seek care elsewhere due to no appt access.  He can call MANUEL, CJ or FAIZA to schedule  "

## 2025-06-03 ENCOUNTER — TELEPHONE (OUTPATIENT)
Dept: INTERNAL MEDICINE | Facility: CLINIC | Age: 70
End: 2025-06-03
Payer: MEDICARE

## 2025-06-11 ENCOUNTER — TELEPHONE (OUTPATIENT)
Dept: INTERNAL MEDICINE | Facility: CLINIC | Age: 70
End: 2025-06-11
Payer: MEDICARE

## 2025-06-11 ENCOUNTER — PATIENT OUTREACH (OUTPATIENT)
Dept: ADMINISTRATIVE | Facility: CLINIC | Age: 70
End: 2025-06-11
Payer: MEDICARE

## 2025-06-11 DIAGNOSIS — I50.32 CHRONIC DIASTOLIC (CONGESTIVE) HEART FAILURE: Primary | ICD-10-CM

## 2025-06-11 NOTE — TELEPHONE ENCOUNTER
Copied from CRM #[id number]. Topic: General Inquiry - Return Call  >> Jun 11, 2025  2:47 PM Arianna wrote:  Type:  Patient Returning Call    Who Called:Crow Green  Who Left Message for Patient:GLORIA   Does the patient know what this is regarding?:no  Would the patient rather a call back or a response via [email or web address]? Call back  Best Call Back Number:[phone]  Additional Information: the patient missed a call.

## 2025-06-11 NOTE — PROGRESS NOTES
C3 nurse spoke with Crow Green for a TCC post hospital discharge follow up call. The patient has a scheduled HOS appointment with Eunice Sebastian NP (Internal Medicine) on 6/16/25 at 9:20 AM .

## 2025-06-11 NOTE — TELEPHONE ENCOUNTER
Copied from CRM #[id number]. Topic: Appointments - Appointment Access  >> Jun 11, 2025  1:58 PM Dang wrote:  Type:  Sooner Apoointment Request    Caller is requesting a sooner appointment.  Caller declined first available appointment listed below.  Caller will not accept being placed on the waitlist and is requesting a message be sent to doctor.  Name of Caller:m pt  When is the first available appointment?   Symptoms: Enhanced Wellness Visit  Would the patient rather a call back or a response via [facility]? phone  Best Call Back Number: [phone]   Additional Information:

## 2025-06-11 NOTE — TELEPHONE ENCOUNTER
Spoke with the patient stated that he received a call from him insurance carrier [employer] informing him that he need a follow up with Dr Polanco. The patient stated that he is unsure the reason for the appointment. The patient was informed that he have an appointment with Dr Polanco on 9/23/25 and an office visit with Ms Sebastian on 6/16/25. The patient stated okay.

## 2025-06-16 ENCOUNTER — OUTPATIENT CASE MANAGEMENT (OUTPATIENT)
Dept: ADMINISTRATIVE | Facility: OTHER | Age: 70
End: 2025-06-16
Payer: MEDICARE

## 2025-06-16 VITALS — SYSTOLIC BLOOD PRESSURE: 140 MMHG | DIASTOLIC BLOOD PRESSURE: 63 MMHG

## 2025-06-16 NOTE — LETTER
Crow Green  [address]  [place] [place] [facility] [address]      Dear Crow Green,     Welcome to [facility] Out[facility] Management Program. We are here to assist patients with multiple long-term (chronic) conditions who often need more personalized healthcare.    It was a pleasure talking with you today. My name is Janette Mena RN. I look forward to working with you as your Care Manager. I will be contacting you by telephone routinely to help coordinate care and resolve issues.    My goal is to help you function at the healthiest and highest level possible. You can contact me directly at [phone].    As an [facility] patient with [employer], some of the services we provide, at no cost to you, include:     Development of an individualized care plan with a Registered Nurse   Connection with a   Assistance from a Community Health Worker  Connection with available resources and services    Coordinate communication among your care team members   Provide coaching and education  Help you understand your doctor's treatment plan  Help you obtain information about your insurance coverage.    All services provided by [facility]s Outpatient Care Managers and other care team members are coordinated with and communicated to your primary care team.      As part of your enrollment, you will be receiving education materials and more information about these services in your [facility] account, by phone, or through the mail. If you do not wish to participate or receive information, you can Opt Out by contacting our office at [phone].     [facility] Patient Rights and Responsibilities available upon request.    Sincerely,      Janette Mena RN  [facility]   Outpatient Care Management

## 2025-06-17 ENCOUNTER — TELEPHONE (OUTPATIENT)
Dept: DIABETES | Facility: CLINIC | Age: 70
End: 2025-06-17
Payer: MEDICARE

## 2025-06-17 DIAGNOSIS — J41.8 MIXED SIMPLE AND MUCOPURULENT CHRONIC BRONCHITIS: Primary | ICD-10-CM

## 2025-06-17 RX ORDER — BLOOD-GLUCOSE METER
EACH MISCELLANEOUS
Qty: 1 EACH | Refills: 0 | Status: SHIPPED | OUTPATIENT
Start: 2025-06-17

## 2025-06-17 NOTE — TELEPHONE ENCOUNTER
Copied from CRM #[id number]. Topic: General Inquiry - Patient Advice  >> Jun 17, 2025  2:51 PM Megan wrote:  Pt is calling to speak with the nurse regarding dexcom. Reports the have the dexcom and not the meter. Please give pt a call at .[phone]

## 2025-06-17 NOTE — PROGRESS NOTES
Outpatient Care Management  Initial Patient Assessment    Patient: Crow Green  MRN: [id number]  Date of Service: 06/16/2025  Completed by: Janette Mena RN  Referral Date: 06/11/2025  Date of Eligibility: 6/12/2025  Program:   High Risk  Status: Ongoing  Effective Dates: 6/16/2025 - present  Responsible Staff: Janette Mena RN        Reason for Visit   Patient presents with    OPCM Enrollment Call       Brief Summary:  Crow Green was referred by Nikita Small MD for Chronic diastolic (congestive) heart failure. Patient qualifies for program based on high risk score of 96.6%.   Active problem list, medical, surgical and social history reviewed. Active comorbidities include Non-proliferative diabetic retinopathy both eyes, Diabetic polyneuropathy, HTN, CAD, CHF, HLD, SANDRITA, Allergic asthma, Psychophysiological insomnia, Paroxysmal atrial fibrillation, Indeterminate stage chronic open angle glaucoma, Gait instability, Arthritis, Osteoarthritis of spine with radiculopathy, GERD, Traumatic tear of left rotator cuff, Chronic shoulder pain, CKD, Schizoaffective disorder (depressive type), Major depressive disorder, Generalized anxiety disorder, Cocaine abuse, Osteopenia, Asthma, Acquired absence of left great toe, Cardiomyopathy, Abnormality of gait and mobility, Type 2 diabetes mellitus, and Chronic respiratory failure with hypoxia on home O2 therapy. Areas of need identified by patient include resources, chronic pain, and CHF.   Next steps: OPCM RN follow up on 6/23/25.    Disability Status  Is the patient alert and oriented (person, place, time, and situation)?: Alert and oriented x 4  Hearing Difficulty or Deaf: yes  Visual Difficulty or Blind: yes  Visual and Hearing Conclusion Statement: He his hard of hearing but doesnt wear hearing aids and he wears glasses to assist with vision.  Difficulty Concentrating, Remembering or Making Decisions: yes  Communication Difficulty: no  Eating/Swallowing  Difficulty: no  Walking or Climbing Stairs Difficulty: yes  Walking or Climbing Stairs: ambulation difficulty, requires equipment  Dressing/Bathing Difficulty: no  Grooming: independent  Transferring (e.g., getting in and out of chairs): assistive equipment  Toileting : Independent  Continence : Continence - Not a problem  Difficulty Managing Errands Independently: yes  Equipment Currently Used at Home: blood pressure machine; glucometer; nebulizer; oxygen; wheelchair; rollator; cane, straight  ADL Conclusion Statement: He is independent with ADLs, but he struggles with errand management due to transportation issues. He currently ambulates with a cane, rollator, or wheelchair depending on where he is and what he's doing.  Change in Functional Status Since Onset of Current Illness/Injury: no        Spiritual Beliefs  Spiritual, Cultural Beliefs, Religion Practices, Values that Affect Care: no      Social History     Socioeconomic History    Marital status: Legally     Number of children: 5   Occupational History    Occupation: disabled    Occupation: PT at [employer]    Tobacco Use    Smoking status: Never    Smokeless tobacco: Never   Substance and Sexual Activity    Alcohol use: Yes     Comment: rare, maybe holidays    Drug use: Not Currently     Types: Cocaine    Sexual activity: Not Currently     Partners: Female   Social History Narrative    Utilizing [employer] transportation which has been unreliable.      Social Drivers of Health     Financial Resource Strain: Low Risk  (6/6/2025)    [facility] from [facility]    Overall Financial Resource Strain (CARDIA)     Difficulty of Paying Living Expenses: Not very hard   Food Insecurity: No Food Insecurity (6/6/2025)    Received from [facility]    Hunger Vital Sign     Worried About Running Out of  Food in the Last Year: Never true     Ran Out of Food in the Last Year: Never true   Transportation Needs: Unmet Transportation Needs (6/6/2025)    [facility]    PRAPARE - Transportation     Lack of Transportation (Medical): Yes     Lack of Transportation (Non-Medical): Yes   Physical Activity: Inactive (12/7/2022)    Exercise Vital Sign     Days of Exercise per Week: 0 days     Minutes of Exercise per Session: 0 min   Stress: No Stress Concern Present (12/20/2024)    [employer] - Occupational Stress Questionnaire     Feeling of Stress : Not at all   Recent Concern: Stress - Stress Concern Present (11/13/2024)    [employer] - Occupational Stress Questionnaire     Feeling of Stress : Very much   Housing Stability: Low Risk  (6/6/2025)    [facility]    Housing Stability Vital Sign     Unable to Pay for Housing in the Last Year: No     Number of Times Moved in the Last Year: 1     Homeless in the Last Year: No       Roles and Relationships  Primary Source of Support/Comfort: no one  Name of Support/Comfort Primary Source: none      Advance Directives (For Healthcare)  Advance Directive  (If Adv Dir status is received, view document under Adv Dir in header or Chart Review Media tab): Patient does not have Advance Directive, declines information.        Patient Reported Insurance  Verified current insurance plan:: [employer] Advantage; Medicaid  [employer] benefits discussed:: [employer]; Transportation; Silver Sneakers            6/16/2025     3:28 PM 5/21/2025     9:31 AM 12/3/2024     9:25 AM 2/27/2024    10:29 AM 1/29/2024    10:26 AM 10/6/2023     9:17 AM 1/12/2023     1:27 PM   Depression Patient Health Questionnaire   Over the last two weeks how often have you been bothered by  little interest or pleasure in doing things Several days Nearly every day Not at all Not at all Nearly every day Nearly every day Not at all    Over the last two weeks how often have you been bothered by feeling down, depressed or hopeless Several days Nearly every day Not at all Not at all Nearly every day Nearly every day Not at all   PHQ-2 Total Score 2 6 0 0 6 6 0   Over the last two weeks how often have you been bothered by trouble falling or staying asleep, or sleeping too much      Nearly every day    Over the last two weeks how often have you been bothered by feeling tired or having little energy      Nearly every day    Over the last two weeks how often have you been bothered by a poor appetite or overeating      Not at all    Over the last two weeks how often have you been bothered by feeling bad about yourself - or that you are a failure or have let yourself or your family down      More than half the days    Over the last two weeks how often have you been bothered by trouble concentrating on things, such as reading the newspaper or watching television      Nearly every day    Over the last two weeks how often have you been bothered by moving or speaking so slowly that other people could have noticed. Or the opposite - being so fidgety or restless that you have been moving around a lot more than usual.      Not at all    Over the last two weeks how often have you been bothered by thoughts that you would be better off dead, or of hurting yourself      Not at all    If you checked off any problems, how difficult have these problems made it for you to do your work, take care of things at home or get along with other people?      Extremely difficult    PHQ-9 Score      17    PHQ-9 Interpretation      Moderately Severe        Data saved with a previous flowsheet row definition       Learning Assessment       06/16/2025 [facility] (6/16/2025 - Present)   Created by Janette Mena, RN - RN  (Nurse) Status: Complete                 PRIMARY LEARNER     Primary Learner Name:  Crow Green  - 06/16/2025 1625    Relationship:  Patient BW - 06/16/2025 1625    Does the primary learner have any barriers to learning?:  Hearing, Reading BW - 06/16/2025 1625    What is the preferred language of the primary learner?:  English BW - 06/16/2025 1625    Is an  required?:  No BW - 06/16/2025 1625    How does the primary learner prefer to learn new concepts?:  Listening, Demonstration BW - 06/16/2025 1625    How often do you need to have someone help you read instructions, pamphlets, or written material from your doctor or pharmacy?:  Always BW - 06/16/2025 1625        CO-LEARNER #1     No question answered        CO-LEARNER #2     No question answered        SPECIAL TOPICS     No question answered        ANSWERED BY:     No question answered        Comments         Edit History       Janette Mena, RN - RN (Nurse)   06/16/2025 1625

## 2025-06-18 ENCOUNTER — TELEPHONE (OUTPATIENT)
Dept: INTERNAL MEDICINE | Facility: CLINIC | Age: 70
End: 2025-06-18
Payer: MEDICARE

## 2025-06-18 ENCOUNTER — TELEPHONE (OUTPATIENT)
Dept: PSYCHIATRY | Facility: CLINIC | Age: 70
End: 2025-06-18
Payer: MEDICARE

## 2025-06-18 ENCOUNTER — OUTPATIENT CASE MANAGEMENT (OUTPATIENT)
Dept: ADMINISTRATIVE | Facility: OTHER | Age: 70
End: 2025-06-18
Payer: MEDICARE

## 2025-06-18 NOTE — TELEPHONE ENCOUNTER
Copied from CRM #[id number]. Topic: Medications - Medication Refill  >> Jun 18, 2025  2:05 PM Gabriel wrote:  ..Type:  RX Refill Request    Who Called: .Gian Green  Refill or New Rx:Refill   RX Name and Strength: albuterol (PROVENTIL) 2.5 mg /3 mL (0.083 %) nebulizer solution  How is the patient currently taking it? (ex. 1XDay):every 4-6 hours as needed   Is this a 30 day or 90 day RX:  Preferred Pharmacy with phone number:.  [id number] - [facility] [place] - [address] AT [place] & [facility]  [address] [place] [address]  Phone: [phone] Fax: [phone]  Local or Mail Order:local   Ordering Provider:Collin   Would the patient rather a call back or a response via [facility]? Call back   Best Call Back Number:.[phone]  Additional Information:

## 2025-06-18 NOTE — TELEPHONE ENCOUNTER
Spoke with the patient informing him that he will need to follow up with his pulmonary provider. The patient stated understanding.          Orders Only  6/17/2025  [facility]       Ron Vazquez MD  Pulmonary Disease Mixed simple and mucopurulent chronic bronchitis  Dx     Orders Placed    NEBULIZER FOR HOME USE  NEBULIZER KIT (SUPPLIES) FOR HOME USE Medication Changes      None  Medication List   Visit Diagnoses      Mixed simple and mucopurulent chronic bronchitis  Problem List

## 2025-06-18 NOTE — TELEPHONE ENCOUNTER
Copied from CRM #[id number]. Topic: Appointments - Appointment Access  >> Jun 18, 2025  2:04 PM Gabriel wrote:  ...Type:  Sooner Apoointment Request    Caller is requesting a sooner appointment.  Caller declined first available appointment listed below.  Caller will not accept being placed on the waitlist and is requesting a message be sent to doctor.  Name of Caller:.Crow Green  When is the first available appointment?  Symptoms:  Would the patient rather a call back or a response via [facility]? Call back   Best Call Back Number:.[phone]  Additional Information: pt states he is needing to schedule an apt

## 2025-06-19 ENCOUNTER — OFFICE VISIT (OUTPATIENT)
Dept: HOME HEALTH SERVICES | Facility: CLINIC | Age: 70
End: 2025-06-19
Payer: MEDICARE

## 2025-06-19 ENCOUNTER — TELEPHONE (OUTPATIENT)
Dept: DIABETES | Facility: CLINIC | Age: 70
End: 2025-06-19
Payer: MEDICARE

## 2025-06-19 ENCOUNTER — TELEPHONE (OUTPATIENT)
Dept: CARDIOLOGY | Facility: CLINIC | Age: 70
End: 2025-06-19
Payer: MEDICARE

## 2025-06-19 VITALS
DIASTOLIC BLOOD PRESSURE: 84 MMHG | HEART RATE: 73 BPM | WEIGHT: 188 LBS | HEIGHT: 65 IN | BODY MASS INDEX: 31.32 KG/M2 | OXYGEN SATURATION: 98 % | TEMPERATURE: 98 F | SYSTOLIC BLOOD PRESSURE: 143 MMHG

## 2025-06-19 DIAGNOSIS — R26.9 ABNORMALITY OF GAIT AND MOBILITY: ICD-10-CM

## 2025-06-19 DIAGNOSIS — M33.13 DERMATOMYOSITIS: ICD-10-CM

## 2025-06-19 DIAGNOSIS — I10 ESSENTIAL HYPERTENSION: ICD-10-CM

## 2025-06-19 DIAGNOSIS — G89.29 OTHER CHRONIC PAIN: ICD-10-CM

## 2025-06-19 DIAGNOSIS — E78.5 HYPERLIPIDEMIA ASSOCIATED WITH TYPE 2 DIABETES MELLITUS: ICD-10-CM

## 2025-06-19 DIAGNOSIS — Z99.81 DEPENDENCE ON SUPPLEMENTAL OXYGEN: ICD-10-CM

## 2025-06-19 DIAGNOSIS — J96.11 CHRONIC RESPIRATORY FAILURE WITH HYPOXIA, ON HOME O2 THERAPY: ICD-10-CM

## 2025-06-19 DIAGNOSIS — J98.4 RESTRICTIVE LUNG MECHANICS DUE TO NEUROMUSCULAR DISEASE: ICD-10-CM

## 2025-06-19 DIAGNOSIS — E11.3312 MODERATE NONPROLIFERATIVE DIABETIC RETINOPATHY OF LEFT EYE WITH MACULAR EDEMA ASSOCIATED WITH TYPE 2 DIABETES MELLITUS: ICD-10-CM

## 2025-06-19 DIAGNOSIS — M54.16 LUMBAR RADICULOPATHY: ICD-10-CM

## 2025-06-19 DIAGNOSIS — M47.26 OSTEOARTHRITIS OF SPINE WITH RADICULOPATHY, LUMBAR REGION: ICD-10-CM

## 2025-06-19 DIAGNOSIS — Z89.412 ACQUIRED ABSENCE OF LEFT GREAT TOE: ICD-10-CM

## 2025-06-19 DIAGNOSIS — Z79.4 TYPE 2 DIABETES MELLITUS WITH STAGE 3A CHRONIC KIDNEY DISEASE, WITH LONG-TERM CURRENT USE OF INSULIN: Primary | ICD-10-CM

## 2025-06-19 DIAGNOSIS — F33.1 MDD (MAJOR DEPRESSIVE DISORDER), RECURRENT EPISODE, MODERATE: ICD-10-CM

## 2025-06-19 DIAGNOSIS — I25.110 ATHEROSCLEROSIS OF NATIVE CORONARY ARTERY OF NATIVE HEART WITH UNSTABLE ANGINA PECTORIS: ICD-10-CM

## 2025-06-19 DIAGNOSIS — H40.1132 PRIMARY OPEN ANGLE GLAUCOMA (POAG) OF BOTH EYES, MODERATE STAGE: ICD-10-CM

## 2025-06-19 DIAGNOSIS — M85.89 OSTEOPENIA OF MULTIPLE SITES: ICD-10-CM

## 2025-06-19 DIAGNOSIS — K21.9 GASTROESOPHAGEAL REFLUX DISEASE WITHOUT ESOPHAGITIS: ICD-10-CM

## 2025-06-19 DIAGNOSIS — G47.33 OSA (OBSTRUCTIVE SLEEP APNEA): ICD-10-CM

## 2025-06-19 DIAGNOSIS — J45.50 SEVERE PERSISTENT EXTRINSIC ASTHMA WITHOUT COMPLICATION: ICD-10-CM

## 2025-06-19 DIAGNOSIS — J45.909 EXTRINSIC ASTHMA, UNSPECIFIED ASTHMA SEVERITY, UNCOMPLICATED: ICD-10-CM

## 2025-06-19 DIAGNOSIS — I48.0 PAF (PAROXYSMAL ATRIAL FIBRILLATION): Chronic | ICD-10-CM

## 2025-06-19 DIAGNOSIS — N18.31 TYPE 2 DIABETES MELLITUS WITH STAGE 3A CHRONIC KIDNEY DISEASE, WITH LONG-TERM CURRENT USE OF INSULIN: ICD-10-CM

## 2025-06-19 DIAGNOSIS — Z79.4 TYPE 2 DIABETES MELLITUS WITH STAGE 3A CHRONIC KIDNEY DISEASE, WITH LONG-TERM CURRENT USE OF INSULIN: ICD-10-CM

## 2025-06-19 DIAGNOSIS — Z99.81 CHRONIC RESPIRATORY FAILURE WITH HYPOXIA, ON HOME O2 THERAPY: ICD-10-CM

## 2025-06-19 DIAGNOSIS — G70.9 RESTRICTIVE LUNG MECHANICS DUE TO NEUROMUSCULAR DISEASE: ICD-10-CM

## 2025-06-19 DIAGNOSIS — I50.42 CHRONIC COMBINED SYSTOLIC AND DIASTOLIC HEART FAILURE: ICD-10-CM

## 2025-06-19 DIAGNOSIS — E11.22 TYPE 2 DIABETES MELLITUS WITH STAGE 3A CHRONIC KIDNEY DISEASE, WITH LONG-TERM CURRENT USE OF INSULIN: Primary | ICD-10-CM

## 2025-06-19 DIAGNOSIS — Z00.00 ENCOUNTER FOR MEDICARE ANNUAL WELLNESS EXAM: Primary | ICD-10-CM

## 2025-06-19 DIAGNOSIS — I65.23 BILATERAL CAROTID ARTERY STENOSIS: ICD-10-CM

## 2025-06-19 DIAGNOSIS — E66.811 OBESITY (BMI 30.0-34.9): ICD-10-CM

## 2025-06-19 DIAGNOSIS — E11.22 TYPE 2 DIABETES MELLITUS WITH STAGE 3A CHRONIC KIDNEY DISEASE, WITH LONG-TERM CURRENT USE OF INSULIN: ICD-10-CM

## 2025-06-19 DIAGNOSIS — F41.1 GAD (GENERALIZED ANXIETY DISORDER): ICD-10-CM

## 2025-06-19 DIAGNOSIS — E11.69 HYPERLIPIDEMIA ASSOCIATED WITH TYPE 2 DIABETES MELLITUS: ICD-10-CM

## 2025-06-19 DIAGNOSIS — I27.20 PULMONARY HYPERTENSION: ICD-10-CM

## 2025-06-19 DIAGNOSIS — I25.118 CORONARY ARTERY DISEASE OF NATIVE ARTERY OF NATIVE HEART WITH STABLE ANGINA PECTORIS: Chronic | ICD-10-CM

## 2025-06-19 DIAGNOSIS — E11.42 DIABETIC POLYNEUROPATHY ASSOCIATED WITH TYPE 2 DIABETES MELLITUS: ICD-10-CM

## 2025-06-19 DIAGNOSIS — M53.3 SACROILIAC DYSFUNCTION: ICD-10-CM

## 2025-06-19 DIAGNOSIS — F25.1 SCHIZOAFFECTIVE DISORDER, DEPRESSIVE TYPE: ICD-10-CM

## 2025-06-19 DIAGNOSIS — N18.31 TYPE 2 DIABETES MELLITUS WITH STAGE 3A CHRONIC KIDNEY DISEASE, WITH LONG-TERM CURRENT USE OF INSULIN: Primary | ICD-10-CM

## 2025-06-19 RX ORDER — LANCETS
EACH MISCELLANEOUS
Qty: 200 EACH | Refills: 0 | Status: SHIPPED | OUTPATIENT
Start: 2025-06-19

## 2025-06-19 RX ORDER — BLOOD-GLUCOSE,RECEIVER,CONT
EACH MISCELLANEOUS
Qty: 1 EACH | Refills: 0 | Status: SHIPPED | OUTPATIENT
Start: 2025-06-19

## 2025-06-19 RX ORDER — FLUTICASONE FUROATE, UMECLIDINIUM BROMIDE AND VILANTEROL TRIFENATATE 200; 62.5; 25 UG/1; UG/1; UG/1
1 POWDER RESPIRATORY (INHALATION) DAILY
COMMUNITY
Start: 2025-06-09

## 2025-06-19 RX ORDER — INSULIN ASPART 100 [IU]/ML
10 INJECTION, SOLUTION INTRAVENOUS; SUBCUTANEOUS
COMMUNITY
Start: 2025-01-04

## 2025-06-19 RX ORDER — BRINZOLAMIDE/BRIMONIDINE TARTRATE 10; 2 MG/ML; MG/ML
1 SUSPENSION/ DROPS OPHTHALMIC 3 TIMES DAILY
Qty: 8 ML | Refills: 3 | Status: SHIPPED | OUTPATIENT
Start: 2025-06-19

## 2025-06-19 RX ORDER — INSULIN ASPART 100 [IU]/ML
20 INJECTION, SOLUTION INTRAVENOUS; SUBCUTANEOUS
COMMUNITY
End: 2025-06-19 | Stop reason: SDUPTHER

## 2025-06-19 RX ORDER — FUROSEMIDE 40 MG/1
40 TABLET ORAL EVERY MORNING
COMMUNITY
Start: 2025-06-09

## 2025-06-19 RX ORDER — CARVEDILOL 12.5 MG/1
12.5 TABLET ORAL 2 TIMES DAILY WITH MEALS
COMMUNITY
Start: 2025-06-09 | End: 2026-06-09

## 2025-06-19 NOTE — TELEPHONE ENCOUNTER
Appt made Friday with Evelyn          ----- Message from ROMIE Bazan sent at 6/19/2025  9:09 AM CDT -----  Hi  Patient recently went to the er on 6/6/25 for Acute exacerbation of CHF (congestive heart failure). Now he is on lasix 40 2.5 tab twice a day which is his regular dose. He states he has difficulty limiting his fluids. He states he drinks about 6 L fluid per day.   Please call pt to schedule an appointment.   He is not isosorbide. Needs sent to [facility].   [facility] started him on aspirin. They started him on coreg. He would need that sent to [facility] also .   Decreased pulses in feet. Consider ABIs?

## 2025-06-19 NOTE — TELEPHONE ENCOUNTER
Pt states he has Dexcom sensors but not . Pt states he needs  . [facility] [place]

## 2025-06-19 NOTE — TELEPHONE ENCOUNTER
Copied from CRM #[id number]. Topic: General Inquiry - Patient Advice  >> Jun 19, 2025  1:32 PM Briana wrote:  Name of Who is Calling:SHAGGY JASON [[id number]]        What is the request in detail:Pt requesting a call back regarding his Dexcon.        Can the clinic reply by [facility]:Call        What Number to Call Back if not in [employer]:Telephone Information:  [employer]          [phone]

## 2025-06-19 NOTE — PROGRESS NOTES
"  Crow Green presented for a follow-up Medicare AWV today. The following components were reviewed and updated:    Medical history  Family History  Social history  Allergies and Current Medications  Health Risk Assessment  Health Maintenance  Care Team    **See Completed Assessments for Annual Wellness visit with in the encounter summary    The following assessments were completed:  Depression Screening  Cognitive function Screening    Timed Get Up Test  Whisper Test      Opioid documentation:      Patient does have a current opioid prescription.      Patient accepted further discussion regarding opioid medication use.      Patient is currently taking tramadol narcotic for back pain.        Pain level today is 10/10.       In addition to narcotic pain medications, patient is also using acetaminophen and topicals for pain control.       Patient is not followed by a specialist currently for their pain and will not be referred today.       Patient's opioid risk potential based on ORT-OUD tool:       Renato each box that applies   No   Yes     Family history of substance abuse   Alcohol [x] []   Illegal drugs [x] []   Rx drugs [x] []     Personal history of substance abuse   Alcohol [x] []   Illegal drugs [] [x]   Rx drugs [x] []     Age between 16-45 years   [x]   []     Patient with ADD, OCD, Bipolar disorder, schizoprenia   []   [x]     Patient with depression   []   [x]                         Scoring total                                             3                    Non-opioid treatment options have been discussed today and added to the patient's after visit summary.          Vitals:    06/19/25 0905   BP: (!) 143/84   Pulse: 73   Temp: 97.7 °F (36.5 °C)   TempSrc: Temporal   SpO2: 98%   Weight: 85.3 kg (188 lb)   Height: 5' 5" (1.651 m)     Body mass index is 31.28 kg/m².       Physical Exam  Constitutional:       Appearance: He is obese.   HENT:      Mouth/Throat:      Mouth: Mucous membranes are moist. "   Cardiovascular:      Rate and Rhythm: Normal rate and regular rhythm.      Pulses:           Dorsalis pedis pulses are 1+ on the right side and 1+ on the left side.        Posterior tibial pulses are 1+ on the right side and 1+ on the left side.      Heart sounds: Normal heart sounds.   Pulmonary:      Effort: Pulmonary effort is normal.      Breath sounds: Normal breath sounds.   Feet:      Right foot:      Protective Sensation: 10 sites tested.  2 sites sensed.      Toenail Condition: Right toenails are normal.      Left foot:      Protective Sensation: 10 sites tested.  0 sites sensed.      Skin integrity: Callus (2 left amputation site and medial right distal foot) present.      Toenail Condition: Left toenails are normal.   Skin:     General: Skin is warm and dry.   Neurological:      General: No focal deficit present.      Mental Status: He is alert and oriented to person, place, and time.      Gait: Gait abnormal.   Psychiatric:         Mood and Affect: Mood normal.         Behavior: Behavior normal.           Diagnoses and health risks identified today and associated recommendations/orders:  1. Encounter for Medicare annual wellness exam  Medicare awv complete. Health maintenance:  rsv vaccines due-encouraged pt to obtain at a pharmacy.    Message sent to PCP team that the patient is not managing his medicines well by himself.  He would like to have home health to help him manage medications.  Patient is not taking Imdur and coreg-message sent to Cardiology, Xolair-patient states he ran out of shots, for Zoloft, Flomax, Topamax-patient has these prescriptions  and will resume, not taking B12-should  OTC medication, not taking Seroquel-recommend seeing Psychiatry/pcp since he does not have a prescription for this but it is on med list.   - Referral to Enhanced Annual Wellness Visit (eAWV) W+1  - Ambulatory referral/consult to Outpatient Case Management    2. Restrictive lung mechanics due to  neuromuscular disease   8. Chronic respiratory failure with hypoxia, on home O2 [id number]. Extrinsic asthma, moderate severity, uncomplicated 17. Dependence on supplemental oxygen  Symptoms stable. Continue  albuterol nebulizer, Trelegy . On o2 2L NC continuous.  Follow up with pulmonology      4. Schizoaffective disorder, depressive type  Not taking seroquel. Symptoms stable. Needs to see psychiatry.    5. Type 2 diabetes mellitus with stage 3a chronic kidney disease, with long-term current use of insulin  15. Diabetic polyneuropathy associated with type 2 diabetes mellitus   Latest Reference Range & Units 12/04/14 10:29 11/15/16 21:35 11/16/16 05:19 12/07/16 12:05 03/16/17 00:21 06/16/17 09:26 07/20/17 10:46 09/28/17 10:39 01/03/18 11:27 02/27/18 09:11 09/13/18 10:28 12/10/18 10:25 03/20/19 10:17 06/27/19 08:41 08/08/19 03:17 12/13/19 16:22 05/19/20 13:25 10/01/20 15:06 04/30/21 08:22 05/25/21 04:32 07/08/21 09:13 12/02/21 09:46 03/03/22 05:03 06/10/22 09:26 07/31/22 17:27 01/04/23 09:45 05/23/23 05:11 09/29/23 09:40 04/10/24 09:29 11/08/24 08:37 05/21/25 10:44   Hemoglobin A1C External 4.0 - 5.6 % 10.6 (H) 13.0 (H) 12.9 (H) 11.7 (H) 8.8 (H) 9.6 (H) 9.6 (H) 9.5 (H) 10.3 (H) 10.2 (H) 8.0 (H) 8.7 (H) 10.8 (H) 9.8 (H) 9.7 (H) 7.5 (H) 7.7 (H) 6.6 (H) 8.9 (H) 9.1 (H) 6.7 (H) 6.7 (H) 6.1 (H) 6.4 (H) 6.0 (H) 6.3 (H) 6.2 (E) 6.4 (H) 5.7 (H) 5.7 (H) 6.5 (H)   (H): Data is abnormally high  (E): External lab result   Latest Reference Range & Units 08/01/22 03:16 08/02/22 05:27 08/03/22 03:13 08/04/22 13:43 08/09/22 16:27 09/23/22 20:54 09/24/22 04:46 09/25/22 04:42 10/24/22 09:33 11/07/22 10:35 03/31/23 03:18 10/30/23 12:50 04/19/24 09:37 04/30/24 11:24 06/13/24 19:25 10/11/24 12:06 11/08/24 08:37 11/12/24 11:11 11/13/24 18:54 11/14/24 07:32 11/15/24 06:52 12/03/24 09:08 12/19/24 05:33 12/20/24 04:27 01/28/25 08:06 05/21/25 10:44   eGFR >60 mL/min/1.73/m2 56 !  56 ! >60  >60 56 ! >60 >60.0 36 ! 48 ! >60 >60 >60.0 >60  >60.0  >60.0 >60.0 >60.0 51 ! >60.0 25.0 ! 25.0 ! 34 ! 41 ! 44 ! 30 ! 32 ! 38 ! 32 ! 47 (L)   !: Data is abnormal  (L): Data is abnormally low  Lab stable.  Continue routine monitoring with labs.  Recommend avoiding nsaids and other nephrotoxic medications. Follow up with PCP/nephrologist.     Controlled. Continue Jardiance and novolog, ozempic. Patient states he does not check his bs. Will go  dexcom today. Reviewed diabetic diet, preferred BS levels, and diabetic foot care. Follow up with PCP as instructed. Follow up with ophthalmology yearly for diabetic eye exam.     6. 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 associated with type 2 diabetes mellitus  Symptoms stable. Continue current management. Follow up with ophthalmology.      7. Dermatomyositis  Toenails. Symptoms stable.  Follow up with pcp/podiatry.     9. DOMINIK (generalized anxiety disorder) 11. MDD (major depressive disorder), recurrent episode, moderate  Moderate depression phq-9 score of 14. Symptoms stable.  Patient will resume taking Zoloft and trazodone.  Patient has had thoughts that he would be better off dead but has not had a plan .message sent to Dr. Reyes.   - Ambulatory referral/consult to Outpatient Case Management    12. Other chronic pain13. Osteoarthritis of spine with radiculopathy, lumbar [id number]. Lumbar radiculopathy 28. Sacroiliac dysfunction  Pain controlled. Continue tramadol . Follow up with pcp.      18. Pulmonary hypertension- echo 7/2022  Symptoms stable. Continue lasix . Follow up with pcp.      19. PAF (paroxysmal atrial fibrillation)  Symptoms and bp stable. Continue  eliquis and will resume coreg. Follow up with cardiology.      20. Nonobstructive coronary artery disease of native artery of native heart 25. Atherosclerosis of native coronary artery of native heart with unstable angina pectoris  Symptoms and bp stable. Will resume coreg and imdur. Continue ranexa. . Follow up  with cardiology.      21. Hyperlipidemia associated with type 2 diabetes mellitus  Lab Results   Component Value Date    CHOL 172 05/21/2025    CHOL 144 11/08/2024    CHOL 117 07/08/2023      Lab Results   Component Value Date    HDL 53 05/21/2025    HDL 39 (L) 11/08/2024    HDL 36 (L) 07/08/2023     Lab Results   Component Value Date    LDLCALC 102.4 05/21/2025    LDLCALC 73.8 11/08/2024    LDLCALC 62 07/08/2023      Lab Results   Component Value Date    TRIG 83 05/21/2025    TRIG 156 (H) 11/08/2024    TRIG 97 07/08/2023     Lab Results   Component Value Date    TOTALCHOLEST 3.2 05/21/2025    TOTALCHOLEST 3.7 11/08/2024    TOTALCHOLEST 3.5 06/10/2022     Lab Results   Component Value Date    NONHDLCHOL 119 05/21/2025    NONHDLCHOL 105 11/08/2024    NONHDLCHOL 81 07/08/2023     Lab Results   Component Value Date    CHOLHDL 30.8 05/21/2025    CHOLHDL 27.1 11/08/2024    CHOLHDL 28.3 06/10/2022    Labs stable. Continue  lipitor. Continue routine monitoring. Managed by PCP.       22. Essential hypertension  BP stable. Continue Norvasc, lisinopril, Lasix . Managed by PCP.       23. Chronic combined systolic and diastolic heart failure  BP stable. Continue Norvasc, lisinopril, Lasix . Managed by Cardiology.      24. Bilateral carotid artery stenosis  Symptoms stable. Continue Lipitor . Follow up with pcp.      26. Obesity (BMI 30.0-34.9)  Recommend diet and exercise to lose weight. Follow up with your PCP as planned to discuss adjustments to your treatment plan.      27. Gastroesophageal reflux disease without esophagitis  Symptoms stable. Continue  Protonix. Follow up with pcp.      29. Osteopenia of multiple sites  stable on current management. Recommend vitamin d, calcium in the diet, and weight bearing exercise. Avoid heavy etoh use and smoking.  Follow up with PCP.       30. Acquired absence of left great toe  Fall precautions. Has callus to stump. Has podiatry appmt.     31. SANDRITA (obstructive sleep apnea)  Symptoms  "stable. Continue CPAP . Follow up with pulmonology.            Message sent to cardiology,"Patient recently went to the er on 6/6/25 for Acute exacerbation of CHF (congestive heart failure). Now he is on lasix 40 2.5 tab twice a day which is his regular dose. He states he has difficulty limiting his fluids. He states he drinks about 6 L fluid per day.   Please call pt to schedule an appointment.   He is not isosorbide. Needs sent to [facility].   [facility] started him on aspirin. They started him on coreg. [facility]   Decreased pulses in feet. Consider ABIs?"    Message sent to pcp team, "Recent hospital admission chf on 6/6. Patient states he is not taking his medications as prescribed and needs home health. I know you ordered it in may. They called on 6/3 no answer no voicemail. He states he will get voicemail set up on sat. He wants to use [facility]. He also said he can receive texts.   Pain now is 10/10 to his back.   Has decreased pulses in feet.   Moderate depression phq-9 score of 14   He is not taking xolair. "Ran out of shots."     Message sent to ophthalmology, "Please call pt to schedule follow up diabetic eye exam.   He is not taking simbrinza since he needs it sent locally [facility]."             Provided Crow with a 5-10 year written screening schedule and personal prevention plan. Recommendations were developed using the USPSTF age appropriate recommendations. Education, counseling, and referrals were provided as needed.  After Visit Summary printed and given to patient which includes a list of additional screenings\tests needed.    Follow up in about 1 year (around 6/19/2026) for annual wellness visit.      ROMIE Oconnor      I offered to discuss advanced care planning, including how to pick a person who would make decisions for you if you were unable to make them for yourself, called a health care power of " , and what kind of decisions you might make such as use of life sustaining treatments such as ventilators and tube feeding when faced with a life limiting illness recorded on a living will that they will need to know. (How you want to be cared for as you near the end of your natural life)     X Patient is interested in learning more about how to make advanced directives.  I provided them paperwork and offered to discuss this with them.

## 2025-06-19 NOTE — Clinical Note
Medicare awv complete. Health maintenance:  rsv vaccines due-encouraged pt to obtain at a pharmacy.   Message sent to PCP team that the patient is not managing his medicines well by himself.  He would like to have home health to help him manage medications.  Patient is not taking Imdur and coreg-message sent to Cardiology, Xolair-patient states he ran out of shots, for Zoloft, Flomax, Topamax-patient has these prescriptions  and will resume, not taking B12-should  OTC medication, not taking Seroquel-recommend seeing Psychiatry/pcp since he does not have a prescription for this but it is on med list.   Moderate depression phq-9 score of 14.   Patient has had thoughts that he would be better off dead but has not had a plan .message sent to Dr. Reyes.   Patient recently went to the er on 6/6/25 for Acute exacerbation of CHF (congestive heart failure). Now he is on lasix 40 2.5 tab twice a day which is his regular dose. Decreased pulses in feet.Message sent to cardiology to schedule an appmt.

## 2025-06-19 NOTE — PROGRESS NOTES
Outpatient Care Management   - Patient Assessment    Patient: Crow Green  MRN:  [id number]  Date of Service:  6/19/2025  Completed by:  Krista Ely LCSW  Referral Date: 06/11/2025    Reason for Visit   Patient presents with    Social Work Assessment     6/18/2025       Brief Summary:  received a referral from OPCM RN for the following HR SW psychosocial needs: transportation, lack of support, and SDOH. Care plan was created in collaboration with patient/caregiver input.  completed the SDOH questionnaire. Patient was initially meeting with a representative from [facility] when SW called; patient asked SW to hold for a few minutes; the representative could be heard ensuring patient has their contact information and informing that he will receive a letter in the mail in about 10 days about the decision. Spoke with patient who reports he lives at home with his elderly father who has a history of stroke; states his father is physically independent, but his speech can be difficult to understand to other people while patient is able to understand him fine. Patient reports he applied for [facility] for more in-home help and hopes his application is approved. Patient reports his aunt helps his father handle his finances and pay bills; patient reports he does not personally require help with his finances. Patient reports a good relationship with his aunt and 3 sisters, but described an estranged relationship with other extended family members and a few siblings. Patient reports he does not drive and his aunt is unable to provide transportation now due to her eye sight. Patient reports he has been using his [employer] transportation benefits and [employer] transportation; patient states he has missed appts in the past due to his insurance's transportation, but indicated [employer] is a bit more reliable. Patient reports he and his father receive a food box every month from a food pantry and  has an application for another food pantry he was thinking about completing. Patient states he ambulates with a rollator and has a W/C for outside/long distance use. Patient did report anxiety and states he does not think his psychiatric medications are working well; patient requested the ph# to [facility] and SW provided the #. Patient denies further needs or questions at this time. SW will follow up in 1 week.

## 2025-06-19 NOTE — PATIENT INSTRUCTIONS
Counseling and Referral of Other Preventative  (Italic type indicates deductible and co-insurance are waived)    Patient Name: Crow Green  Today's Date: 6/19/2025    Health Maintenance       Date Due Completion Date    RSV Vaccine (Age 60+ and Pregnant patients) (1 - Risk 60-74 years 1-dose series) Never done ---    Foot Exam 04/10/2025 4/10/2024 (Done)    Override on 4/10/2024: Done    Override on 4/11/2023: Done    Override on 6/9/2022: Done    Override on 6/3/2021: Done    Override on 2/21/2020: Done    Diabetic Eye Exam 10/22/2025 10/22/2024    Override on 1/18/2023: Done    PROSTATE-SPECIFIC ANTIGEN 11/08/2025 11/8/2024    Diabetes Urine Screening 11/08/2025 11/8/2024    Hemoglobin A1c 11/21/2025 5/21/2025    Lipid Panel 05/21/2026 5/21/2025    TETANUS VACCINE 07/23/2030 7/23/2020    Colorectal Cancer Screening 03/04/2032 3/4/2022        No orders of the defined types were placed in this encounter.      The following information is provided to all patients.  This information is to help you find resources for any of the problems found today that may be affecting your health:                  Living healthy guide: www.[facility].[place].gov      Understanding Diabetes: www.diabetes.org      Eating healthy: www.cdc.gov/healthyweight      CDC home safety checklist: www.cdc.gov/steadi/patient.html      Agency on Aging: www.goea.[place].[facility]      Alcoholics anonymous (AA): www.aa.org      Physical Activity: www.fabrice.nih.gov/jt3gqgg      Tobacco use: www.quitwithusla.org

## 2025-06-20 PROBLEM — L97.523 DIABETIC ULCER OF TOE OF LEFT FOOT ASSOCIATED WITH TYPE 2 DIABETES MELLITUS, WITH NECROSIS OF MUSCLE: Status: RESOLVED | Noted: 2020-06-26 | Resolved: 2025-06-20

## 2025-06-20 PROBLEM — J98.4 RESTRICTIVE LUNG MECHANICS DUE TO NEUROMUSCULAR DISEASE: Status: ACTIVE | Noted: 2025-06-20

## 2025-06-20 PROBLEM — G70.9 RESTRICTIVE LUNG MECHANICS DUE TO NEUROMUSCULAR DISEASE: Status: ACTIVE | Noted: 2025-06-20

## 2025-06-20 PROBLEM — J45.50: Status: ACTIVE | Noted: 2025-06-20

## 2025-06-20 PROBLEM — E11.621 DIABETIC ULCER OF TOE OF LEFT FOOT ASSOCIATED WITH TYPE 2 DIABETES MELLITUS, WITH NECROSIS OF MUSCLE: Status: RESOLVED | Noted: 2020-06-26 | Resolved: 2025-06-20

## 2025-06-20 RX ORDER — ASPIRIN 81 MG/1
81 TABLET ORAL DAILY
COMMUNITY

## 2025-06-23 ENCOUNTER — CLINICAL SUPPORT (OUTPATIENT)
Dept: DIABETES | Facility: CLINIC | Age: 70
End: 2025-06-23
Payer: MEDICARE

## 2025-06-23 ENCOUNTER — LAB VISIT (OUTPATIENT)
Dept: LAB | Facility: HOSPITAL | Age: 70
End: 2025-06-23
Attending: NURSE PRACTITIONER
Payer: MEDICARE

## 2025-06-23 ENCOUNTER — OFFICE VISIT (OUTPATIENT)
Dept: INTERNAL MEDICINE | Facility: CLINIC | Age: 70
End: 2025-06-23
Payer: MEDICARE

## 2025-06-23 ENCOUNTER — TELEPHONE (OUTPATIENT)
Dept: DIABETES | Facility: CLINIC | Age: 70
End: 2025-06-23
Payer: MEDICARE

## 2025-06-23 ENCOUNTER — OUTPATIENT CASE MANAGEMENT (OUTPATIENT)
Dept: ADMINISTRATIVE | Facility: OTHER | Age: 70
End: 2025-06-23
Payer: MEDICARE

## 2025-06-23 VITALS
DIASTOLIC BLOOD PRESSURE: 90 MMHG | RESPIRATION RATE: 18 BRPM | WEIGHT: 204.38 LBS | SYSTOLIC BLOOD PRESSURE: 158 MMHG | TEMPERATURE: 99 F | HEART RATE: 102 BPM | BODY MASS INDEX: 34.01 KG/M2 | OXYGEN SATURATION: 99 %

## 2025-06-23 DIAGNOSIS — N18.31 TYPE 2 DIABETES MELLITUS WITH STAGE 3A CHRONIC KIDNEY DISEASE, WITH LONG-TERM CURRENT USE OF INSULIN: Primary | ICD-10-CM

## 2025-06-23 DIAGNOSIS — N18.31 TYPE 2 DIABETES MELLITUS WITH STAGE 3A CHRONIC KIDNEY DISEASE, WITH LONG-TERM CURRENT USE OF INSULIN: ICD-10-CM

## 2025-06-23 DIAGNOSIS — E11.22 TYPE 2 DIABETES MELLITUS WITH STAGE 3A CHRONIC KIDNEY DISEASE, WITH LONG-TERM CURRENT USE OF INSULIN: ICD-10-CM

## 2025-06-23 DIAGNOSIS — J96.11 CHRONIC RESPIRATORY FAILURE WITH HYPOXIA, ON HOME O2 THERAPY: ICD-10-CM

## 2025-06-23 DIAGNOSIS — M25.511 CHRONIC RIGHT SHOULDER PAIN: ICD-10-CM

## 2025-06-23 DIAGNOSIS — I50.42 CHRONIC COMBINED SYSTOLIC AND DIASTOLIC HEART FAILURE: ICD-10-CM

## 2025-06-23 DIAGNOSIS — Z99.81 CHRONIC RESPIRATORY FAILURE WITH HYPOXIA, ON HOME O2 THERAPY: ICD-10-CM

## 2025-06-23 DIAGNOSIS — J01.00 ACUTE MAXILLARY SINUSITIS, RECURRENCE NOT SPECIFIED: ICD-10-CM

## 2025-06-23 DIAGNOSIS — E11.22 TYPE 2 DIABETES MELLITUS WITH STAGE 3A CHRONIC KIDNEY DISEASE, WITH LONG-TERM CURRENT USE OF INSULIN: Primary | ICD-10-CM

## 2025-06-23 DIAGNOSIS — R09.81 SINUS CONGESTION: ICD-10-CM

## 2025-06-23 DIAGNOSIS — M47.26 OSTEOARTHRITIS OF SPINE WITH RADICULOPATHY, LUMBAR REGION: ICD-10-CM

## 2025-06-23 DIAGNOSIS — F33.1 MDD (MAJOR DEPRESSIVE DISORDER), RECURRENT EPISODE, MODERATE: ICD-10-CM

## 2025-06-23 DIAGNOSIS — Z79.4 TYPE 2 DIABETES MELLITUS WITH STAGE 3A CHRONIC KIDNEY DISEASE, WITH LONG-TERM CURRENT USE OF INSULIN: ICD-10-CM

## 2025-06-23 DIAGNOSIS — G89.29 CHRONIC RIGHT SHOULDER PAIN: ICD-10-CM

## 2025-06-23 DIAGNOSIS — F19.10 SUBSTANCE ABUSE: ICD-10-CM

## 2025-06-23 DIAGNOSIS — G89.29 OTHER CHRONIC PAIN: Primary | ICD-10-CM

## 2025-06-23 DIAGNOSIS — Z79.4 TYPE 2 DIABETES MELLITUS WITH STAGE 3A CHRONIC KIDNEY DISEASE, WITH LONG-TERM CURRENT USE OF INSULIN: Primary | ICD-10-CM

## 2025-06-23 LAB
BNP SERPL-MCNC: 326 PG/ML (ref 0–99)
EAG (OHS): 140 MG/DL (ref 68–131)
HBA1C MFR BLD: 6.5 % (ref 4–5.6)

## 2025-06-23 PROCEDURE — 3077F SYST BP >= 140 MM HG: CPT | Mod: CPTII,HCNC,S$GLB, | Performed by: NURSE PRACTITIONER

## 2025-06-23 PROCEDURE — 99215 OFFICE O/P EST HI 40 MIN: CPT | Mod: HCNC,25,S$GLB, | Performed by: NURSE PRACTITIONER

## 2025-06-23 PROCEDURE — 3288F FALL RISK ASSESSMENT DOCD: CPT | Mod: CPTII,HCNC,S$GLB, | Performed by: NURSE PRACTITIONER

## 2025-06-23 PROCEDURE — 36415 COLL VENOUS BLD VENIPUNCTURE: CPT | Mod: HCNC

## 2025-06-23 PROCEDURE — 1159F MED LIST DOCD IN RCRD: CPT | Mod: CPTII,HCNC,S$GLB, | Performed by: NURSE PRACTITIONER

## 2025-06-23 PROCEDURE — 1101F PT FALLS ASSESS-DOCD LE1/YR: CPT | Mod: CPTII,HCNC,S$GLB, | Performed by: NURSE PRACTITIONER

## 2025-06-23 PROCEDURE — 3008F BODY MASS INDEX DOCD: CPT | Mod: CPTII,HCNC,S$GLB, | Performed by: NURSE PRACTITIONER

## 2025-06-23 PROCEDURE — 3044F HG A1C LEVEL LT 7.0%: CPT | Mod: CPTII,HCNC,S$GLB, | Performed by: NURSE PRACTITIONER

## 2025-06-23 PROCEDURE — 83036 HEMOGLOBIN GLYCOSYLATED A1C: CPT | Mod: HCNC

## 2025-06-23 PROCEDURE — 83880 ASSAY OF NATRIURETIC PEPTIDE: CPT | Mod: HCNC

## 2025-06-23 PROCEDURE — 96372 THER/PROPH/DIAG INJ SC/IM: CPT | Mod: HCNC,S$GLB,, | Performed by: NURSE PRACTITIONER

## 2025-06-23 PROCEDURE — 3080F DIAST BP >= 90 MM HG: CPT | Mod: CPTII,HCNC,S$GLB, | Performed by: NURSE PRACTITIONER

## 2025-06-23 PROCEDURE — 3066F NEPHROPATHY DOC TX: CPT | Mod: CPTII,HCNC,S$GLB, | Performed by: NURSE PRACTITIONER

## 2025-06-23 PROCEDURE — 4010F ACE/ARB THERAPY RXD/TAKEN: CPT | Mod: CPTII,HCNC,S$GLB, | Performed by: NURSE PRACTITIONER

## 2025-06-23 PROCEDURE — 1125F AMNT PAIN NOTED PAIN PRSNT: CPT | Mod: CPTII,HCNC,S$GLB, | Performed by: NURSE PRACTITIONER

## 2025-06-23 PROCEDURE — 99999 PR PBB SHADOW E&M-EST. PATIENT-LVL V: CPT | Mod: PBBFAC,HCNC,, | Performed by: NURSE PRACTITIONER

## 2025-06-23 RX ORDER — KETOROLAC TROMETHAMINE 15 MG/ML
30 INJECTION, SOLUTION INTRAMUSCULAR; INTRAVENOUS
Status: COMPLETED | OUTPATIENT
Start: 2025-06-23 | End: 2025-06-23

## 2025-06-23 RX ORDER — AMOXICILLIN 875 MG/1
875 TABLET, COATED ORAL 2 TIMES DAILY
Qty: 14 TABLET | Refills: 0 | Status: SHIPPED | OUTPATIENT
Start: 2025-06-23 | End: 2025-06-30

## 2025-06-23 RX ADMIN — KETOROLAC TROMETHAMINE 30 MG: 15 INJECTION, SOLUTION INTRAMUSCULAR; INTRAVENOUS at 10:06

## 2025-06-23 NOTE — PROGRESS NOTES
Subjective:       Patient ID: Crow Green is a 70 y.o. male.    Chief Complaint: Follow-up (Pt presents to clinic for sinus meds, back pain, blood in stool and breathing issues.)    HPI        Past Medical History:   Diagnosis Date    Arthritis     Asthma     Atrial fibrillation     Atrial fibrillation with RVR 08/07/2019    Carotid artery stenosis     CHF (congestive heart failure)     Chronic obstructive pulmonary disease 08/05/2020    Depression     Dermatomyositis     Diabetes mellitus, type 2 Diagnosed in 2000    Elevated coronary artery calcium score 02/14/2024    Elevated PSA     Follow up 07/30/2020    Hypertension     Myocardial infarction     2017    Sleep apnea      Past Surgical History:   Procedure Laterality Date    ABLATION OF ARRHYTHMOGENIC FOCUS FOR ATRIAL FIBRILLATION N/A 2/27/2020    Procedure: Ablation atrial fibrillation;  Surgeon: Gio Brown MD;  [facility] [facility];  Service: Cardiology;  Laterality: N/A;  afib, DAMIEN (cx if SR), PVI, PITO, anes, MB, 3 Prep    CHOLECYSTECTOMY      CLOSURE OF WOUND Left 7/1/2020    Procedure: CLOSURE, WOUND;  Surgeon: Barb Veras DPM;  Location: [facility] OR;  Service: Podiatry;  Laterality: Left;    COLONOSCOPY N/A 3/4/2022    Procedure: COLONOSCOPY;  Surgeon: Yolis Freitas MD;  Location: [facility] ENDO;  Service: Endoscopy;  Laterality: N/A;    ESOPHAGOGASTRODUODENOSCOPY N/A 3/4/2022    Procedure: EGD (ESOPHAGOGASTRODUODENOSCOPY);  Surgeon: Yolis Freitas MD;  Location: [facility] ENDO;  Service: Endoscopy;  Laterality: N/A;    INJECTION OF ANESTHETIC AGENT INTO SACROILIAC JOINT Left 3/24/2021    Procedure: Left BLOCK, SACROILIAC JOINT and bilateral rhomboid TPI;  Surgeon: Dillan Tsai MD;  Location: [facility] PAIN MGT;  Service: Pain Management;  Laterality: Left;    INTRALUMINAL GASTROINTESTINAL TRACT IMAGING VIA CAPSULE N/A 3/22/2022    Procedure: IMAGING PROCEDURE, GI TRACT, INTRALUMINAL, VIA CAPSULE;  Surgeon: Justice Martinez RN;  Location:  [facility] ENDO;  Service: Endoscopy;  Laterality: N/A;    REVERSE TOTAL SHOULDER ARTHROPLASTY Left 1/10/2022    Procedure: ARTHROPLASTY, SHOULDER, TOTAL, REVERSE;  Surgeon: Anthony Alvarado MD;  Location: [facility] [place] OR;  Service: Orthopedics;  Laterality: Left;  left reverse total shoulder arthroplasty     SELECTIVE INJECTION OF ANESTHETIC AGENT AROUND LUMBAR SPINAL NERVE ROOT BY TRANSFORAMINAL APPROACH Left 6/11/2020    Procedure: BLOCK, SPINAL NERVE ROOT, LUMBAR, SELECTIVE, TRANSFORAMINAL APPROACH Left L4-5, L5-S1 TFESI with RN IV sedation;  Surgeon: Dillan Tsai MD;  Location: [facility] PAIN MGT;  Service: Pain Management;  Laterality: Left;    TOE AMPUTATION Left 6/29/2020    Procedure: AMPUTATION, TOE;  Surgeon: Barb Veras DPM;  Location: [facility] [place] OR;  Service: Podiatry;  Laterality: Left;  great toe     Social History[1]  Review of patient's allergies indicates:   Allergen Reactions    Protamine Hives     Urticaria, possible upper airway swelling     Current Outpatient Medications   Medication Sig    albuterol (PROVENTIL) 2.5 mg /3 mL (0.083 %) nebulizer solution INHALE THE CONTENTS OF 1 VIAL VIA NEBULIZER EVERY 4 TO 6 HOURS AS NEEDED FOR WHEEZING OR FOR SHORTNESS OF BREATH    albuterol sulfate 90 mcg/actuation aebs Inhale 2 puffs into the lungs every 4 (four) hours as needed (WHEEZING OR SHORTNESS OF BREATH).    allopurinoL (ZYLOPRIM) 100 MG tablet TAKE 1 TABLET EVERY DAY    amLODIPine (NORVASC) 2.5 MG tablet Take 1 tablet (2.5 mg total) by mouth once daily.    aspirin (ECOTRIN) 81 MG EC tablet Take 81 mg by mouth once daily.    atorvastatin (LIPITOR) 40 MG tablet Take 1 tablet (40 mg total) by mouth every evening.    blood-glucose meter (TRUE METRIX GLUCOSE METER) Misc TO CHECK BLOOD GLUCOSE THREE TIMES DAILY    blood-glucose meter Misc Use as instructed to test blood glucose meter daily.    blood-glucose,,cont (DEXCOM G7 ) Misc Use to check BG    brinzolamide-brimonidine (SIMBRINZA) 1-0.2 % DrpS  "Place 1 drop into both eyes 3 (three) times daily.    carvediloL (COREG) 12.5 MG tablet Take 12.5 mg by mouth 2 (two) times daily with meals.    cyanocobalamin (VITAMIN B-12) 1000 MCG tablet Take 1 tablet by mouth once daily.    DEXCOM G7 SENSOR Shefali Change sensor every 10 days    ELIQUIS 5 mg Tab TAKE 1 TABLET TWICE DAILY    empagliflozin (JARDIANCE) 25 mg tablet Take 1 tablet (25 mg total) by mouth once daily.    furosemide (LASIX) 40 MG tablet Take 40 mg by mouth every morning.    gabapentin (NEURONTIN) 300 MG capsule Take 1 capsule (300 mg total) by mouth 3 (three) times daily.    insulin aspart U-100 (NOVOLOG U-100 INSULIN ASPART) 100 unit/mL injection Inject 10 Units into the skin as needed for High Blood Sugar.    isosorbide mononitrate (IMDUR) 30 MG 24 hr tablet Take 1 tablet (30 mg total) by mouth once daily.    lancets [facility] To check BG 3 times daily, to use with insurance preferred meter    latanoprost 0.005 % ophthalmic solution Place 1 drop into both eyes every evening.    lisinopriL (PRINIVIL,ZESTRIL) 30 MG tablet Take 1 tablet (30 mg total) by mouth once daily.    multivitamin-iron-folic acid (SENTRY) Tab Take 1 tablet by mouth once daily.    omalizumab (XOLAIR) 150 mg/mL injection Inject 2 mLs (300 mg total) into the skin every 14 (fourteen) days.    omalizumab (XOLAIR) 75 mg/0.5 mL injection Inject 0.5 mLs (75 mg total) into the skin every 14 (fourteen) days.    pantoprazole (PROTONIX) 40 MG tablet Take 1 tablet (40 mg total) by mouth once daily.    pen needle, diabetic (BD ULTRA-FINE SHORT PEN NEEDLE) 31 gauge x 5/16" Ndle 1 each by Misc.(Non-Drug; Combo Route) route 3 (three) times daily.    pep injection Inject 0.15 ml as directed     For compounding pharmacy use:   Add PAPAVERINE 30 mg  Add PHENTOLAMINE 1 mg  Add ALPROSTADIL 20 mcg    QUEtiapine (SEROQUEL) 50 MG tablet Take 50 mg by mouth every evening.    ranolazine (RANEXA) 1,000 mg Tb12 Take 1 tablet (1,000 mg total) by mouth 2 (two) times " daily.    semaglutide (OZEMPIC) 2 mg/dose (8 mg/3 mL) PnIj Inject 2 mg into the skin every 7 days.    sertraline (ZOLOFT) 100 MG tablet Take 1 tablet (100 mg total) by mouth every evening.    tamsulosin (FLOMAX) 0.4 mg Cap Take 1 capsule (0.4 mg total) by mouth once daily.    topiramate (TOPAMAX) 25 MG tablet Take 1 tablet (25 mg total) by mouth 2 (two) times daily.    traMADoL (ULTRAM) 50 mg tablet Take 50 mg by mouth every 6 (six) hours as needed.    traZODone (DESYREL) 150 MG tablet Take 1 to 2 tablets at bedtime as needed for sleep    TRELEGY ELLIPTA 200-62.5-25 mcg inhaler Inhale 1 puff into the lungs once daily.    VIOS AEROSOL DELIVERY SYSTEM Shefali USE AS DIRESTED    blood sugar diagnostic Strp To check BG 3 times daily, to use with insurance preferred meter (Patient not taking: Reported on 6/23/2025)    EPINEPHrine (EPIPEN) 0.3 mg/0.3 mL AtIn Inject 0.3 mLs (0.3 mg total) into the muscle once. for 1 dose     Current Facility-Administered Medications   Medication    omalizumab injection 300 mg    omalizumab injection 75 mg           Review of Systems    Objective:      Physical Exam    Assessment:     Vitals:    06/23/25 0940   BP: (!) 158/90   Pulse: 102   Resp: 18   Temp: 98.9 °F (37.2 °C)         1. Other chronic pain    2. Osteoarthritis of spine with radiculopathy, lumbar region    3. Chronic right shoulder pain    4. Chronic combined systolic and diastolic heart failure    5. MDD (major depressive disorder), recurrent episode, moderate    6. Substance abuse(UDS + for cocaine )    7. Chronic respiratory failure with hypoxia, on home O2 therapy    8. Type 2 diabetes mellitus with stage 3a chronic kidney disease, with long-term current use of insulin        Plan:   Other chronic pain  -     Ambulatory referral/consult to Spine Surgery; Future; Expected date: 06/30/2025    Osteoarthritis of spine with radiculopathy, lumbar region  -     Ambulatory referral/consult to Spine Surgery; Future; Expected date:  06/30/2025  -     HOSPITAL BED FOR HOME USE    Chronic right shoulder pain  -     Ambulatory referral/consult to Orthopedics; Future; Expected date: 06/30/2025  -     HOSPITAL BED FOR HOME USE    Chronic combined systolic and diastolic heart failure    MDD (major depressive disorder), recurrent episode, moderate  -     Ambulatory referral/consult to Home Health; Future; Expected date: 06/24/2025  -     Ambulatory referral/consult to Outpatient Case Management    Substance abuse(UDS + for cocaine )  -     Ambulatory referral/consult to Home Health; Future; Expected date: 06/24/2025  -     Ambulatory referral/consult to Outpatient Case Management    Chronic respiratory failure with hypoxia, on home O2 therapy  -     Ambulatory referral/consult to Home Health; Future; Expected date: 06/24/2025  -     Ambulatory referral/consult to Outpatient Case Management    Type 2 diabetes mellitus with stage 3a chronic kidney disease, with long-term current use of insulin  -     Comprehensive Metabolic Panel; Future; Expected date: 12/20/2025  -     Hemoglobin A1C; Future; Expected date: 06/23/2025          New medications:  Change in chronic medications:  Stop medications  Referrals  Labs/radiology-as above  Monitor   Interventions (non-medicinal)  Follow up  Time spent with patient          [1]   Social History  Socioeconomic History    Marital status: Legally     Number of children: 5   Occupational History    Occupation: disabled    Occupation: PT at [employer]    Tobacco Use    Smoking status: Never    Smokeless tobacco: Never   Substance and Sexual Activity    Alcohol use: Yes     Comment: rare, maybe holidays    Drug use: Not Currently     Types: Cocaine    Sexual activity: Not Currently     Partners: Female   Social History Narrative    Utilizing [employer] transportation which has been unreliable.      Social Drivers of Health     Financial Resource Strain: Low Risk  (6/19/2025)    Overall Financial  Resource Strain (CARDIA)     Difficulty of Paying Living Expenses: Not very hard   Food Insecurity: Food Insecurity Present (6/19/2025)    Hunger Vital Sign     Worried About Running Out of Food in the Last Year: Often true     Ran Out of Food in the Last Year: Often true   Transportation Needs: Unmet Transportation Needs (6/19/2025)    PRAPARE - Transportation     Lack of Transportation (Medical): Yes     Lack of Transportation (Non-Medical): Yes   Physical Activity: Inactive (6/19/2025)    Exercise Vital Sign     Days of Exercise per Week: 0 days     Minutes of Exercise per Session: 0 min   Stress: Stress Concern Present (6/19/2025)    [place] [employer] [place] [employer] - Occupational Stress Questionnaire     Feeling of Stress : Very much   Housing Stability: Low Risk  (6/19/2025)    Housing Stability Vital Sign     Unable to Pay for Housing in the Last Year: No     Number of Times Moved in the Last Year: 0     Homeless in the Last Year: No

## 2025-06-23 NOTE — PROGRESS NOTES
Outpatient Care Management  Plan of Care Follow Up Visit    Patient: Crow Green  MRN: [id number]  Date of Service: 06/23/2025  Completed by: Janette Mena RN  Referral Date: 06/23/2025    Reason for Visit   Patient presents with    OPCM RN Follow Up Call       Brief Summary: OPCM RN followed up with Mr. Maria today for care plan review.    Next Steps:   Mr. Maria agreed to OPCM RN follow up on or around 7/14/25.  Follow up regarding cardiac symptoms.  Follow up regarding monitoring of fluids and sodium.  Follow up regarding BP.  Mr. Maria agreed to try to eat meals more regularly daily and he agreed to eat something when we hang up.

## 2025-06-23 NOTE — PROGRESS NOTES
"Patient ID: Crow Green is a 70 y.o. male.    Chief Complaint: Follow-up (Pt presents to clinic for sinus meds, back pain, blood in stool and breathing issues.)    History of Present Illness    CHIEF COMPLAINT:  Patient presents today with back and shoulder pain.    HISTORY OF PRESENT ILLNESS:  He reports chronic pain managed with multiple medications including Tramadol and Gabapentin. He notes Gabapentin causes drowsiness but continues daily use. Previous treatments included hydrocodone prescriptions from Dr. Polanco and Dr. Valerio, with last prescription from ER in January of previous year. Back injections by Dr. Marino were ineffective. He expresses frustration with current pain management and suggests potential need for surgical intervention on back and shoulder. He is not currently seeing a dedicated pain management specialist.    RECENT HOSPITALIZATION:  He was hospitalized 2-3 weeks ago for 3 days due to breathing problems and heart failure exacerbation. During admission, he tested positive for cocaine. He had stopped his medications for approximately one week prior to admission due to lack of assistance with medication management.    MEDICATION MANAGEMENT:  He reports having approximately 50 pill bottles at home and expresses significant difficulty managing multiple medications, stating he is unable to take all medications simultaneously. He did not take his blood pressure medication today, reporting he intentionally avoids taking medications before appointments. He acknowledges challenges with medication adherence, particularly when feeling overwhelmed by the number of prescribed medications. He has new eye drops that appear milky in color, which he finds unusual.    DIABETES:  He received a new glucose testing device but is unfamiliar with its operation and has not been actively checking blood sugar. He acknowledges not following a diabetic diet, stating he "just does not eat really." He reports " "weight changes significant enough that his clothing no longer fits properly.    SLEEP APNEA:  He reports significant sleep disturbances and has not been using his CPAP machine, having previously returned it because he was unable to sleep with the device. He endorses complete lack of sleep for approximately three weeks and indicates interest in obtaining a new sleep apnea machine.    SOCIAL HISTORY:  His sister recently  from cancer on Saturday, which he describes as having a "double or triple bad phase." He reports limited family support, stating he only has himself and his brother. He appears emotionally impacted by his sister's death and expresses difficulty coping with the loss. He affirms complete cessation of drug use, specifically stating he no longer smokes, snorts, or injects drugs.    SINUS ISSUES:  He reports recurrent sinus symptoms that consistently occur when going outside, describing nasal dripping triggered by outdoor exposure. He uses prescribed nasal spray as needed, applying it every time he goes outside and potentially again upon returning home.      ROS:  General: -fever, -chills, -fatigue, -weight gain, -weight loss  Eyes: -vision changes, -redness, -discharge  ENT: -ear pain, +nasal congestion, -sore throat, +nasal discharge, +runny nose  Cardiovascular: -chest pain, -palpitations, -lower extremity edema  Respiratory: -cough, -shortness of breath, +difficulty breathing  Gastrointestinal: -abdominal pain, -nausea, -vomiting, -diarrhea, -constipation, -blood in stool, +loss of appetite  Genitourinary: -dysuria, -hematuria, -frequency  Musculoskeletal: -joint pain, -muscle pain, +back pain, +pain with movement  Skin: -rash, -lesion  Neurological: -headache, -dizziness, -numbness, -tingling, +difficulty staying asleep, +difficulty falling asleep, +sleep disturbances  Psychiatric: -anxiety, -depression, +sleep difficulty  Lymphatics: +swollen lymph nodes  Neck: +shooting pain sensation, +neck " pain         Physical Exam    General: No acute distress. obese  Eyes: EOMI. Sclerae anicteric.  HENT: Normocephalic. Atraumatic. Nares patent. Inflamed sinuses/swollen erythematous nasal mucosa. Reactive lymphadenopathy R tonsilar   Ears: Bilateral TMs clear. Bilateral EACs clear.  Cardiovascular: Regular rate. Regular rhythm. No murmurs. No rubs. No gallops. Normal S1, S2.  Respiratory: Normal respiratory effort. Clear to auscultation bilaterally. No rales. No rhonchi. No wheezing.  Abdomen: Soft. Non-tender. Non-distended. Normoactive bowel sounds.  Musculoskeletal:decreased ROM right shoulder/tenderness. Decreased ROM lower back/tenderness bilateral radiates into both legs. Ambulating with rolator.  Extremities: No lower extremity edema.  Neurological: Alert & oriented x3. No slurred speech.  Psychiatric: dysphoric mood. No HI/SI  Skin: Warm. Dry. No rash.  Neck: Swollen and reactive lymph nodes.         Assessment & Plan    I50.33 Acute on chronic diastolic (congestive) heart failure  G89.21 Chronic pain due to trauma  M54.50 Low back pain, unspecified  M25.511 Pain in right shoulder  F14.90 Cocaine use, unspecified, uncomplicated  G47.33 Obstructive sleep apnea (adult) (pediatric)  E11.65 Type 2 diabetes mellitus with hyperglycemia  J32.9 Chronic sinusitis, unspecified  R59.1 Generalized enlarged lymph nodes  Z91.198 Patient's noncompliance with other medical treatment and regimen for other reason  Z91.120 Patient's intentional underdosing of medication regimen due to financial hardship  Z63.4 Disappearance and death of family member    ACUTE ON CHRONIC DIASTOLIC HEART FAILURE:  - Patient was recently hospitalized for 3 days due to heart failure exacerbation, likely caused by stopping heart failure medication for a week.  - Scheduled cardiology appointment for medication adjustment and management of heart failure.  - Ordered nasal cannula for oxygen administration.    CHRONIC PAIN (BACK AND SHOULDER):  -  Previous pain management with hydrocodone and tramadol was ineffective.  - Will continue Gabapentin and Tramadol for pain management.  - Administered Toradol injection today for pain relief.  - Referred to spine and orthopedic specialists for surgical evaluation due to chronic pain.    LOW BACK PAIN:  - Patient's chronic low back pain persists despite previous injections.  - Recommend using heat or ice for pain management.  - Referred to spine specialist for evaluation and potential surgical intervention.  - Referred to home health for physical therapy.    RIGHT SHOULDER PAIN:  - Chronic right shoulder pain persists despite previous injections.  - Referred to orthopedic specialist for evaluation and potential surgical intervention.    COCAINE USE:  - Documented positive cocaine test during hospital admission.  - Patient acknowledges past cocaine use but claims cessation.  - Discussed risks associated with illicit drug use, including presence of fentanyl in street drugs.  - Advised patient to avoid environments with cocaine use due to risk of second-hand exposure and to focus on recovery.    OBSTRUCTIVE SLEEP APNEA:  - Patient reports inability to sleep and returned CPAP machine due to discomfort.  - Currently awaiting a new one.  - Advised to discuss CPAP issues with pulmonologist and consider alternative treatments.  - Scheduled follow-up with Dr. Mushtaq Stephenson to discuss sleep apnea and CPAP usage.    TYPE 2 DIABETES WITH HYPERGLYCEMIA:  - Patient reports not checking blood sugar and not following diabetic diet.  - Having difficulty managing diabetes and requires assistance with Dexcom setup.  - Instructed to contact the office for assistance with setting up Dexcom device.  - Ordered consults for diabetes management and case management to improve control and reduce hospitalizations.    CHRONIC SINUSITIS:  - Patient reports sinus flare-ups despite consistent use of nasal spray.  - Evaluated inflamed sinuses causing  swollen lymph nodes.  - Prescribed antibiotic for treatment.  - Advised to continue using nasal spray consistently, especially when going outside.    GENERALIZED ENLARGED LYMPH NODES:  - Patient has pain and swelling in lymph nodes due to inflamed sinuses.  - Evaluated and explained that lymph nodes are reactive and connected to sinusitis.  - Advised to continue using nasal spray to manage sinus inflammation, which should help reduce lymph node swelling.  - Prescribed medication for sinusitis to address the underlying cause.    MEDICATION NONCOMPLIANCE:  - Patient has difficulty managing medications resulting in frequent hospitalizations, as evidenced by recen  t heart failure exacerbation.  - Ordered case management consult to address noncompliance and improve medication management.    MEDICATION UNDERDOSING DUE TO FINANCIAL HARDSHIP:  - Patient has not been taking medication regularly due to financial hardship and lack of assistance.  - Unable to manage medication regimen effectively due to financial and logistical challenges.  - Initiated case management consult to provide more permanent help with medication management.    DEATH OF FAMILY MEMBERS:  - Patient is experiencing emotional distress due to the recent death of sister and brother.  - Reports not having good family support following these losses.  -depression/insomnia managed by psychiatry . Upcoming appt next month.  -advised to continue medications as prescribed until seen by psych    FOLLOW-UP:  - Ordered labs.  - Noted that frequent hospitalizations and difficulty managing chronic conditions indicate need for long-term care assistance. Consults placed to  for PT/OT nurse to assess needs/case mgt.             F/u pcp 1 mo  Specialists as scheduled  Chronic complex care done    This note was generated with the assistance of ambient listening technology. Verbal consent was obtained by the patient and accompanying visitor(s) for the recording of patient  appointment to facilitate this note. I attest to having reviewed and edited the generated note for accuracy, though some syntax or spelling errors may persist. Please contact the author of this note for any clarification.

## 2025-06-23 NOTE — TELEPHONE ENCOUNTER
Staff from internal med informed writer that pt was in clinic and needed assist with applying his dexcom . Pt scheduled for nurse visit today at [phone]

## 2025-06-23 NOTE — PROGRESS NOTES
Pt presents in clinic needing assistance with setting up his new dexcom . He has already applied a sensor, but since he just picked up the  yesterday, it is not set up. Writer entered all required info and paired the sensor. Appt already scheduled for 8/1/25 at 0930. He has been instructed to bring his  with him to his appt so that [facility] office will have access to its data, He verbalized understanding.

## 2025-06-24 ENCOUNTER — RESULTS FOLLOW-UP (OUTPATIENT)
Dept: INTERNAL MEDICINE | Facility: CLINIC | Age: 70
End: 2025-06-24

## 2025-06-25 ENCOUNTER — OUTPATIENT CASE MANAGEMENT (OUTPATIENT)
Dept: ADMINISTRATIVE | Facility: OTHER | Age: 70
End: 2025-06-25
Payer: MEDICARE

## 2025-06-25 NOTE — PROGRESS NOTES
6/25/2025  1st attempt to complete Follow-Up for OPCM, unable to leave message. Attempt letter sent. Collaborating with OPCM RN for successful patient contact.

## 2025-06-25 NOTE — LETTER
Crow Green  [address]  [facility] [place] [address]      Dear Crow Green,     I am writing from the Outpatient Care Management Department at [facility]. I have been unsuccessful at reaching you since we spoke on Wednesday 6/18/2025. I hope you found the assistance previously provided to you helpful.     Please contact me at [phone] from 8:00AM to 4:30 PM on Monday thru Friday.     As you know, [facility] also has a program with a nurse available 24/7 to answer questions or provide medical advice. [facility] can be reached at [phone].    Sincerely,       BALTA Velasco  Outpatient Care Management

## 2025-06-27 ENCOUNTER — TELEPHONE (OUTPATIENT)
Dept: INTERNAL MEDICINE | Facility: CLINIC | Age: 70
End: 2025-06-27
Payer: MEDICARE

## 2025-06-27 ENCOUNTER — PATIENT MESSAGE (OUTPATIENT)
Dept: ADMINISTRATIVE | Facility: CLINIC | Age: 70
End: 2025-06-27
Payer: MEDICARE

## 2025-06-27 NOTE — TELEPHONE ENCOUNTER
Contacted Mague from [facility] to schedule Pt's Hospital F/u. Ms. Bowser advised that the pt would not be discharged until tomorrow. I advised that Dr. Polanco didn't have anything sooner and I offered a follow up appt wit AILEEN Sebastian for July 3rd @9:20 am. Ms. Bowser expressed understanding and agreed on appt on July 3rd

## 2025-07-01 ENCOUNTER — TELEPHONE (OUTPATIENT)
Dept: INTERNAL MEDICINE | Facility: CLINIC | Age: 70
End: 2025-07-01
Payer: MEDICARE

## 2025-07-01 NOTE — TELEPHONE ENCOUNTER
Spoke to Jignesh from [facility] home health regarding message from pt regarding receiving help at home as he has just been discharged from hospital on yesterday, Jignesh transferred me to [facility] agency [facility], Abi advised me that a nurse reached out to pt on yesterday and advised pt that they would be out to his home on tomorrow.

## 2025-07-02 ENCOUNTER — NURSE TRIAGE (OUTPATIENT)
Dept: ADMINISTRATIVE | Facility: CLINIC | Age: 70
End: 2025-07-02
Payer: MEDICARE

## 2025-07-02 ENCOUNTER — OUTPATIENT CASE MANAGEMENT (OUTPATIENT)
Dept: ADMINISTRATIVE | Facility: OTHER | Age: 70
End: 2025-07-02
Payer: MEDICARE

## 2025-07-02 ENCOUNTER — TELEPHONE (OUTPATIENT)
Dept: INTERNAL MEDICINE | Facility: CLINIC | Age: 70
End: 2025-07-02
Payer: MEDICARE

## 2025-07-02 NOTE — TELEPHONE ENCOUNTER
Patient calling stating he received a letter from Krista and he needs to speak with her. Denies any symptoms at this time. He says he is needing help with daily activities. He says he needs help with preparing food, house chores, and fixing his meds. Please contact caller back as soon as possible to discuss concerns.     Reason for Disposition   Follow-up information-only call to recent contact, no triage required    Protocols used: Information Only Call - No Triage-A-OH

## 2025-07-02 NOTE — TELEPHONE ENCOUNTER
I tried calling the patient with no answer.   The patient is currently enrolled in [facility]. We spoke with the home health department on 7/1/25 concerning the patient's request for an aide to help assist him with his daily hygiene. Dr. Polanco's office was informed that the NP is scheduled to see the patient on 7/3/25 and will discuss his concerns.

## 2025-07-02 NOTE — PROGRESS NOTES
Outpatient Care Management   - Care Plan Follow Up    Patient: Crow Green  MRN:  [id number]  Date of Service:  7/2/2025  Completed by:  Krista Ely LCSW  Referral Date: 06/23/2025    Reason for Visit   Patient presents with    Other     6/25/2025  1st attempt to complete Follow-Up for [facility], unable to leave message. Attempt letter sent. Collaborating with OPCM RN for successful patient contact.    OPCM Post Discharge     7/2/2025    OPCM SW Follow Up Call     7/2/2025       Brief Summary: Spoke with patient and completed post DC assessment. Patient reports he felt as if he could not breathe and called 911. Patient reports he is feeling better post discharge, but does feel fatigued. Patient reports he needs someone ASAP to help around the home, including housework. Inquired if patient has received any mail from [facility] regarding their decision on his application for the state waiver program, patient states he is unsure and has not checked his mail yet. SW encouraged patient to check his mail when he is feeling well enough to do so. Reviewed patient's [employer] benefits and it appears patient has [place] Personal Home Care benefits. Informed patient and provided ph# to [employer] member services to request activation of that benefit. Ordered post DC meals per patient request. Patient reports he has made an appt with [facility]. Patient denies further questions or needs at this time. SW will follow up in 3 weeks.    Complex Care Plan     Care plan was discussed and completed today with input from patient and/or caregiver.    Patient Instructions     No follow-ups on file.

## 2025-07-02 NOTE — TELEPHONE ENCOUNTER
Copied from CRM #[id number]. Topic: Appointments - Amb Referral  >> Jul 2, 2025  2:55 PM Min wrote:  Type:  Patient Requesting Referral    Who Called:Pt   Does the patient already have the specialty appointment scheduled?:no   If yes, what is the date of that appointment?:none   Referral to What Specialty:Home Health Services   Reason for Referral: Discharge from hospital, Need help with daily task  Does the patient want the referral with a specific physician?:none   [facility]   Patient Requesting a Response?:Yes   Would the patient rather a call back or a response via [facility]? Call Back   Best Call Back Number:[phone]    Additional Information:

## 2025-07-02 NOTE — PROGRESS NOTES
Outpatient Care Management  Plan of Care Follow Up Visit    Patient: Crow Green  MRN: [id number]  Date of Service: 07/02/2025  Completed by: Janette Mena RN  Referral Date: 06/23/2025    Reason for Visit   Patient presents with    OPCM RN Follow Up Call    OPCM Post Discharge       Brief Summary: OPCM RN followed up with Mr. Maria today for care plan review.    Next Steps:   Mr. Maria agreed to [facility] RN follow up on or around 7/16/25.  Follow up regarding cardiac/CHF symptoms.  Follow up regarding fluid intake.  Follow up regarding fatigue.  Follow up regarding O2.  Mr. Maria agreed to start monitoring his fluid intake daily.

## 2025-07-03 ENCOUNTER — PATIENT OUTREACH (OUTPATIENT)
Dept: ADMINISTRATIVE | Facility: CLINIC | Age: 70
End: 2025-07-03
Payer: MEDICARE

## 2025-07-03 NOTE — PROGRESS NOTES
C3 nurse spoke with Crow Green for a TCC post hospital discharge follow up call. The patient has a scheduled HOSFU appointment with Lakeisha Pastor PA-C 7/14/25 @9:20am.

## 2025-07-08 ENCOUNTER — TELEPHONE (OUTPATIENT)
Dept: OPHTHALMOLOGY | Facility: CLINIC | Age: 70
End: 2025-07-08
Payer: MEDICARE

## 2025-07-08 NOTE — TELEPHONE ENCOUNTER
Pt wants a sooner appointment.      Copied from CRM #[id number]. Topic: Appointments - Appointment Access  >> Jul 8, 2025  1:38 PM Nhung wrote:  ..Type:  Patient Requesting Call    Who Called: pt   Does the patient know what this is regarding?: scheduling eye appt   Would the patient rather a call back or a response via [facility]? Call   Best Call Back Number: .[phone] (home)  Additional Information:

## 2025-07-09 ENCOUNTER — TELEPHONE (OUTPATIENT)
Dept: CARDIOLOGY | Facility: CLINIC | Age: 70
End: 2025-07-09
Payer: MEDICARE

## 2025-07-09 NOTE — TELEPHONE ENCOUNTER
Patient stated he needs a different fluid pill or a higher mg of Lasix because the 40mg is not working. Patient also stated that he recently took 6 lasix to start using the bathroom.

## 2025-07-09 NOTE — TELEPHONE ENCOUNTER
Spoke with the patient and he sees you on 07/25, is that ok or do you want something sooner?            Can you please get him an appt in clinic?     He missed his appt--needs to be seen to adjust his medicatio

## 2025-07-10 NOTE — TELEPHONE ENCOUNTER
Pt would only go to [facility] and in AM would not go to any other location or time frame    Scheduled 7/15 with DR Zhao for 9:20am    Pt agreed

## 2025-07-14 ENCOUNTER — OFFICE VISIT (OUTPATIENT)
Dept: INTERNAL MEDICINE | Facility: CLINIC | Age: 70
End: 2025-07-14
Payer: MEDICARE

## 2025-07-14 ENCOUNTER — TELEPHONE (OUTPATIENT)
Dept: CARDIOLOGY | Facility: CLINIC | Age: 70
End: 2025-07-14
Payer: MEDICARE

## 2025-07-14 ENCOUNTER — HOSPITAL ENCOUNTER (OUTPATIENT)
Dept: RADIOLOGY | Facility: HOSPITAL | Age: 70
Discharge: HOME | End: 2025-07-14
Attending: PHYSICIAN ASSISTANT
Payer: MEDICARE

## 2025-07-14 VITALS
HEIGHT: 65 IN | DIASTOLIC BLOOD PRESSURE: 62 MMHG | OXYGEN SATURATION: 98 % | WEIGHT: 191.38 LBS | SYSTOLIC BLOOD PRESSURE: 100 MMHG | TEMPERATURE: 97 F | BODY MASS INDEX: 31.89 KG/M2 | HEART RATE: 64 BPM

## 2025-07-14 DIAGNOSIS — Z09 HOSPITAL DISCHARGE FOLLOW-UP: Primary | ICD-10-CM

## 2025-07-14 DIAGNOSIS — F41.1 GAD (GENERALIZED ANXIETY DISORDER): ICD-10-CM

## 2025-07-14 DIAGNOSIS — I25.110 ATHEROSCLEROSIS OF NATIVE CORONARY ARTERY OF NATIVE HEART WITH UNSTABLE ANGINA PECTORIS: ICD-10-CM

## 2025-07-14 DIAGNOSIS — J98.4 RESTRICTIVE LUNG MECHANICS DUE TO NEUROMUSCULAR DISEASE: ICD-10-CM

## 2025-07-14 DIAGNOSIS — Z99.81 DEPENDENCE ON SUPPLEMENTAL OXYGEN: ICD-10-CM

## 2025-07-14 DIAGNOSIS — I50.42 CHRONIC COMBINED SYSTOLIC AND DIASTOLIC HEART FAILURE: ICD-10-CM

## 2025-07-14 DIAGNOSIS — F33.2 SEVERE EPISODE OF RECURRENT MAJOR DEPRESSIVE DISORDER, WITHOUT PSYCHOTIC FEATURES: ICD-10-CM

## 2025-07-14 DIAGNOSIS — G47.33 OSA (OBSTRUCTIVE SLEEP APNEA): ICD-10-CM

## 2025-07-14 DIAGNOSIS — Z74.09 OTHER REDUCED MOBILITY: ICD-10-CM

## 2025-07-14 DIAGNOSIS — M54.2 CHRONIC NECK PAIN: ICD-10-CM

## 2025-07-14 DIAGNOSIS — I50.33 ACUTE ON CHRONIC DIASTOLIC CHF (CONGESTIVE HEART FAILURE): ICD-10-CM

## 2025-07-14 DIAGNOSIS — F25.1 SCHIZOAFFECTIVE DISORDER, DEPRESSIVE TYPE: ICD-10-CM

## 2025-07-14 DIAGNOSIS — G89.29 CHRONIC NECK PAIN: ICD-10-CM

## 2025-07-14 DIAGNOSIS — R13.10 DYSPHAGIA, UNSPECIFIED TYPE: ICD-10-CM

## 2025-07-14 DIAGNOSIS — R26.81 GAIT INSTABILITY: ICD-10-CM

## 2025-07-14 DIAGNOSIS — G70.9 RESTRICTIVE LUNG MECHANICS DUE TO NEUROMUSCULAR DISEASE: ICD-10-CM

## 2025-07-14 DIAGNOSIS — F19.10 SUBSTANCE ABUSE: ICD-10-CM

## 2025-07-14 PROBLEM — Z13.31 POSITIVE DEPRESSION SCREENING: Status: RESOLVED | Noted: 2022-12-07 | Resolved: 2025-07-14

## 2025-07-14 PROCEDURE — 3066F NEPHROPATHY DOC TX: CPT | Mod: CPTII,HCNC,S$GLB, | Performed by: PHYSICIAN ASSISTANT

## 2025-07-14 PROCEDURE — 1159F MED LIST DOCD IN RCRD: CPT | Mod: CPTII,HCNC,S$GLB, | Performed by: PHYSICIAN ASSISTANT

## 2025-07-14 PROCEDURE — 3008F BODY MASS INDEX DOCD: CPT | Mod: CPTII,HCNC,S$GLB, | Performed by: PHYSICIAN ASSISTANT

## 2025-07-14 PROCEDURE — 99999 PR PBB SHADOW E&M-EST. PATIENT-LVL V: CPT | Mod: PBBFAC,HCNC,, | Performed by: PHYSICIAN ASSISTANT

## 2025-07-14 PROCEDURE — 1101F PT FALLS ASSESS-DOCD LE1/YR: CPT | Mod: CPTII,HCNC,S$GLB, | Performed by: PHYSICIAN ASSISTANT

## 2025-07-14 PROCEDURE — 99214 OFFICE O/P EST MOD 30 MIN: CPT | Mod: HCNC,S$GLB,, | Performed by: PHYSICIAN ASSISTANT

## 2025-07-14 PROCEDURE — 71046 X-RAY EXAM CHEST 2 VIEWS: CPT | Mod: TC,HCNC

## 2025-07-14 PROCEDURE — 3288F FALL RISK ASSESSMENT DOCD: CPT | Mod: CPTII,HCNC,S$GLB, | Performed by: PHYSICIAN ASSISTANT

## 2025-07-14 PROCEDURE — 3074F SYST BP LT 130 MM HG: CPT | Mod: CPTII,HCNC,S$GLB, | Performed by: PHYSICIAN ASSISTANT

## 2025-07-14 PROCEDURE — 1126F AMNT PAIN NOTED NONE PRSNT: CPT | Mod: CPTII,HCNC,S$GLB, | Performed by: PHYSICIAN ASSISTANT

## 2025-07-14 PROCEDURE — G2211 COMPLEX E/M VISIT ADD ON: HCPCS | Mod: HCNC,S$GLB,, | Performed by: PHYSICIAN ASSISTANT

## 2025-07-14 PROCEDURE — 3044F HG A1C LEVEL LT 7.0%: CPT | Mod: CPTII,HCNC,S$GLB, | Performed by: PHYSICIAN ASSISTANT

## 2025-07-14 PROCEDURE — 3078F DIAST BP <80 MM HG: CPT | Mod: CPTII,HCNC,S$GLB, | Performed by: PHYSICIAN ASSISTANT

## 2025-07-14 PROCEDURE — 71046 X-RAY EXAM CHEST 2 VIEWS: CPT | Mod: 26,HCNC,, | Performed by: RADIOLOGY

## 2025-07-14 PROCEDURE — 1160F RVW MEDS BY RX/DR IN RCRD: CPT | Mod: CPTII,HCNC,S$GLB, | Performed by: PHYSICIAN ASSISTANT

## 2025-07-14 PROCEDURE — 4010F ACE/ARB THERAPY RXD/TAKEN: CPT | Mod: CPTII,HCNC,S$GLB, | Performed by: PHYSICIAN ASSISTANT

## 2025-07-14 RX ORDER — DICLOFENAC SODIUM 10 MG/G
2 GEL TOPICAL 3 TIMES DAILY
Qty: 100 G | Refills: 0 | Status: SHIPPED | OUTPATIENT
Start: 2025-07-14 | End: 2025-07-24

## 2025-07-14 RX ORDER — METHOCARBAMOL 750 MG/1
750 TABLET, FILM COATED ORAL NIGHTLY
Qty: 10 TABLET | Refills: 0 | Status: SHIPPED | OUTPATIENT
Start: 2025-07-14 | End: 2025-07-24

## 2025-07-14 NOTE — TELEPHONE ENCOUNTER
Spoke with pt in regards to him needing to reschedule his appt. Pt decided to keep original appt.               Copied from CRM #[id number]. Topic: Appointments - Appointment Access  >> Jul 14, 2025 11:48 AM Katy wrote:  .Type:  Patient Requesting Call    Who Called:Crow Green  Does the patient know what this is regarding?:Patient requesting a call back to reschedule appointment  Would the patient rather a call back or a response via [facility]? Call back  Best Call Back Number:.[phone] (home)   Additional Information:

## 2025-07-14 NOTE — PROGRESS NOTES
"  Subjective:      Patient ID: Crow Green is a 70 y.o. male.    Chief Complaint: Hospital Follow Up    HPI  History of Present Illness    CHIEF COMPLAINT:  Here today for a hospital follow up. Admitted for acute on chronic chf 6/24/2025-6/30.     HFpEF (grade 2 DD on echo earlier this month), DM2 (A1c 6.5% in May), questionable COPD/asthma (on 2 L/min NC chronically at home), atrial fibrillation on Eliquis, SANDRTIA (not on CPAP recently), GERD, and cocaine use. Admitted 6/6 - 6/9 for worsening dyspnea/volume overload that developed after patient was reportedly off all medications for 1 week leading up to presentation. UDS was also positive for cocaine at that admission.      Discontinued Lasix. Started patient on Bumex 2 mg daily. Patient instructed to weigh himself every day and take extra dose of Bumex in case of his weight increase by 2 to 3 pound    Scheduled to see cardiology tomorrow for his hospital follow up.     He reports multiple hospitalizations this year, approximately 3-4 times. He appears uncertain about the specific reasons for his recent medical encounters and expresses some confusion about his current medical status and recent hospital visits. He has been discharged with home health services.    MUSCULOSKELETAL PAIN:  He reports ongoing pain involving back, neck, and shoulder regions. Neck pain has been present for the past two days, described as severe and exacerbated by pillow positioning. He attributes potential pain onset to a recent "popping" incident and acknowledges a history of neck problems. The persistent pain is impacting his comfort and mobility. He has been lost to follow up from pain management.     SLEEP DISORDERS:  He reports ongoing sleep difficulties for the past two months, characterized by waking up at 3:00 AM nightly and experiencing interrupted sleep. He has a history of sleep apnea and previously used a CPAP machine that worked well and allowed him to sleep more " "soundly. He reports difficulty tolerating a previous CPAP device and is currently not using any nighttime breathing assistance equipment. He currently takes Tramadol 2 tablets for sleep. Scheduled to see pulm for brijesh follow up next month.     HEART FAILURE:  He reports ongoing swelling in his lower extremities that has been worsening recently. He is currently taking multiple fluid pills a day, approximately 4-5 tablets daily.  He expresses concern about potential need to return to the hospital due to swelling and appears aware of fluid retention issues but inconsistent with medication management.  Stopped all his meds before his hospitalization because home health stopped coming. Pt go frustrated and was unable to manage his meds on his own so he just stopped taking them all.     DYSPHAGIA:  He reports experiencing swallowing difficulties with both solids and liquids, describing pain when swallowing food and saliva. He notes producing white, clear sputum when spitting up and reports sensation of food or saliva feeling "stuck" in throat during swallowing attempts. Symptoms are currently active and causing significant discomfort.        Medications were reviewed          Wt Readings from Last 3 Encounters:   07/14/25 0905 86.8 kg (191 lb 5.8 oz)   06/23/25 0940 92.7 kg (204 lb 5.9 oz)   06/19/25 0905 85.3 kg (188 lb)     Problem List[1]    Current Medications[2]    Review of Systems  ROS:  General: -fever, -chills, +fatigue, -weight gain, -weight loss  Eyes: -vision changes, -redness, -discharge  ENT: -ear pain, -nasal congestion, -sore throat  Cardiovascular: -chest pain, -palpitations, +lower extremity edema  Respiratory: +cough, -shortness of breath, +sputum production  Gastrointestinal: -abdominal pain, -nausea, -vomiting, -diarrhea, -constipation, -blood in stool, +difficulty swallowing  Genitourinary: -dysuria, -hematuria, -frequency  Musculoskeletal: -joint pain, -muscle pain, +back pain, +neck pain, +pain with " "movement  Skin: -rash, -lesion  Neurological: -headache, -dizziness, -numbness, -tingling, +difficulty falling asleep, +difficulty staying asleep, +involuntary movements, +tremors  Psychiatric: -anxiety, -depression, +sleep difficulty   Objective:   /62 (BP Location: Left arm, Patient Position: Sitting)   Pulse 64   Temp 96.5 °F (35.8 °C) (Tympanic)   Ht 5' 5" (1.651 m)   Wt 86.8 kg (191 lb 5.8 oz)   SpO2 98%   BMI 31.84 kg/m²     Physical Exam  Vitals and nursing note reviewed.   Constitutional:       General: He is not in acute distress.     Appearance: Normal appearance. He is well-developed. He is not ill-appearing, toxic-appearing or diaphoretic.   HENT:      Head: Normocephalic and atraumatic.   Cardiovascular:      Rate and Rhythm: Normal rate and regular rhythm.      Heart sounds: Normal heart sounds. No murmur heard.     No friction rub. No gallop.   Pulmonary:      Effort: Pulmonary effort is normal. No respiratory distress.      Breath sounds: Normal breath sounds. No wheezing or rales.   Skin:     General: Skin is warm.      Findings: No rash.   Neurological:      Mental Status: He is alert and oriented to person, place, and time.   Psychiatric:         Mood and Affect: Mood normal.         Behavior: Behavior normal.         Thought Content: Thought content normal.         Judgment: Judgment normal.       Lab Results   Component Value Date    HGBA1C 6.5 (H) 06/23/2025       Assessment:     1. Hospital discharge follow-up    2. Acute on chronic diastolic CHF (congestive heart failure)    3. Chronic combined systolic and diastolic heart failure    4. SANDRITA (obstructive sleep apnea)    5. Chronic neck pain    6. Dysphagia, unspecified type    7. Substance abuse(UDS + for cocaine )    8. DOMINIK (generalized anxiety disorder)    9. Severe episode of recurrent major depressive disorder, without psychotic features    10. Schizoaffective disorder, depressive type    11. Other reduced mobility    12. " Restrictive lung mechanics due to neuromuscular disease    13. Gait instability    14. Dependence on supplemental oxygen    15. Atherosclerosis of native coronary artery of native heart with unstable angina pectoris      Plan:   Hospital discharge follow-up  Assessed heart failure management, noting concerns about fluid retention and medication adherence.  Evaluated sleep issues, considering untreated sleep apnea as potential cause for frequent nighttime awakenings.  Considered need for pain management, particularly for neck and back discomfort.  Assessed swallowing difficulties and cough, determining need for further diagnostic evaluation.    Acute on chronic diastolic CHF (congestive heart failure)  -     CBC Auto Differential; Future; Expected date: 07/14/2025  -     Comprehensive Metabolic Panel; Future  -     TSH; Future  -     BNP; Future; Expected date: 07/14/2025  -     X-Ray Chest PA And Lateral; Future; Expected date: 07/14/2025  - Ordered BNP, electrolytes, and chest XR to assess heart failure, renal function, and check for fluid buildup in lungs.  - Mr. Green reports swelling in feet and difficulty breathing since hospital discharge, with 3-4 hospitalizations this year.  - Weight is stable.  - Will recheck labs today to monitor electrolytes and kidney function due to diuretics.  - Prescribed Bumex for heart failure management.  - Instructed patient to follow up with cardiologist (Dr. Clement Rubio) tomorrow for medication adjustment based on lab results.  - Discussed risks of self-adjusting diuretic dosage without medical supervision.    Chronic combined systolic and diastolic heart failure  - see cardiology tomorrow to discuss treatment  -advised to take bumex as prescribed an not multiple pills during the day.    -Bumex 2 mg daily. Take extra dose of Bumex if weight increase by 2 to 3 pound  - Recommend wearing compression socks to help with fluid retention.    SANDRITA (obstructive sleep apnea)  -      Ambulatory referral/consult to Pulmonology; Future; Expected date: 07/21/2025  - Mr. Green reports waking up at 3:00 AM for the last 2 months and difficulty sleeping.  - Does not use CPAP at night due to discomfort with the provided device.  - Explained importance of CPAP use for managing sleep apnea and improving sleep quality.  - Instructed patient to use CPAP machine nightly once obtained.  - Referred to sleep specialist for follow-up and to discuss obtaining a more suitable CPAP machine.  - Advised patient to consult with Dr. Landin about getting the CPAP mask that was effective during sleep study.    Chronic neck pain  -     Ambulatory referral/consult to Pain Clinic; Future; Expected date: 07/21/2025  -     methocarbamoL (ROBAXIN) 750 MG Tab; Take 1 tablet (750 mg total) by mouth nightly. for 10 days  Dispense: 10 tablet; Refill: 0  -     diclofenac sodium (VOLTAREN) 1 % Gel; Apply 2 g topically 3 (three) times daily. for 10 days  Dispense: 100 g; Refill: 0    Dysphagia, unspecified type  -     Fl Modified Barium Swallow Speech; Future; Expected date: 07/14/2025  -     SLP video swallow; Future; Expected date: 07/14/2025  - Mr. Green reports difficulty swallowing, choking on food, and spitting up white, clear substance when trying to swallow.  - Ordered swallow study to evaluate reported swallowing difficulties and chest XR to check for fluid in lungs.    Substance abuse(UDS + for cocaine )  DOMINIK (generalized anxiety disorder)  Severe episode of recurrent major depressive disorder, without psychotic features  Schizoaffective disorder, depressive type  -scheduled for follow up with his psychiatrist in 2 weeks.     Gait instability  Other reduced mobility  Dependence on supplemental oxygen  Restrictive lung mechanics due to neuromuscular disease  -follow up with pulm as scheduled.     Atherosclerosis of native coronary artery of native heart with unstable angina pectoris  -cont eliquis, lipitor,  asa  -follow up with cardiology tomorrow as scheduled.     CERVICALGIA:  - Mr. Green reports neck pain for 2 days, worsened by pillow use.  - Prescribed muscle relaxer to be taken at night for neck pain and as a sleep aid.  - Sent medication prescription to pharmacy.    BACK PAIN:  - Mr. Green reports back pain and difficulty sleeping due to pain.  - Referred back to pain management for ongoing pain issues.  - Sent medication prescription to pharmacy for pain management.    SHOULDER PAIN:  - Mr. Green reports shoulder pain contributing to sleep difficulties.  - Referred to pain management for ongoing issues.  - Sent medication prescription to pharmacy.      FOLLOW-UP:  - Follow up in 1 month to reassess overall condition and review specialist appointments.  - Mr. Green to contact the office if symptoms worsen or new concerns arise.       This note was generated with the assistance of ambient listening technology. Verbal consent was obtained by the patient and accompanying visitor(s) for the recording of patient appointment to facilitate this note. I attest to having reviewed and edited the generated note for accuracy, though some syntax or spelling errors may persist. Please contact the author of this note for any clarification.    Follow up if symptoms worsen or fail to improve.           [1]   Patient Active Problem List  Diagnosis    ED (erectile dysfunction)    Non-proliferative diabetic retinopathy, mild, both eyes    Essential hypertension    Diabetic polyneuropathy    Nonobstructive coronary artery disease of native artery of native heart    Chronic combined systolic and diastolic heart failure    Type 2 diabetes mellitus with stage 3a chronic kidney disease, with long-term current use of insulin    SANDRITA (obstructive sleep apnea)    Extrinsic asthma, unspecified asthma severity, uncomplicated    Psychophysiological insomnia    Nightmares    Sleep related gastroesophageal reflux disease     Inadequate sleep hygiene    Chest pain    PAF (paroxysmal atrial fibrillation)    At risk for medication noncompliance    Obesity (BMI 30.0-34.9)    Indeterminate stage chronic open angle glaucoma    Right hip pain    Gait instability    Chronic right shoulder pain    Arthritis    Left sided sciatica    Osteoarthritis of spine with radiculopathy, lumbar region    HNP (herniated nucleus pulposus), lumbar    Gastroesophageal reflux disease without esophagitis    Hypogonadism in male    Disorder of parathyroid gland    Hyperlipidemia associated with type 2 diabetes mellitus    Traumatic tear of left rotator cuff    Other chronic pain    Left shoulder pain    Complete tear of left rotator cuff    CKD (chronic kidney disease)    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 associated with type 2 diabetes mellitus    Lumbar radiculopathy    Ulcer of left foot    Elevated troponin    Hypotension due to medication    Dermatomyositis    Postprocedural hypotension    Advanced directives, counseling/discussion    Schizoaffective disorder, depressive type    MDD (major depressive disorder), recurrent episode, moderate    DOMINIK (generalized anxiety disorder)    Bilateral carpal tunnel syndrome    Rotator cuff tear arthropathy of left shoulder    Sacroiliac dysfunction    Atherosclerosis of native coronary artery of native heart with unstable angina pectoris    Coronary vasospasm    Cocaine abuse    Osteopenia    Atherosclerosis of aorta    Cardiac asthma    Chronic gout of multiple sites    Acquired absence of left great toe    Hypoxia    Cardiomyopathy    Bilateral carotid artery stenosis    Substance abuse(UDS + for cocaine )    Immunocompromised patient    Other reduced mobility    Abnormality of gait and mobility    Dependence on supplemental oxygen    Pulmonary hypertension- echo 7/2022    NICM (nonischemic cardiomyopathy)    Elevated IgE level    Moderate persistent asthma without  complication    Elevated coronary artery calcium score    Other chest pain    JOSE MARIA (acute kidney injury)    Type 2 diabetes mellitus    Chronic respiratory failure with hypoxia, on home O2 therapy    Other thrombophilia    Chronic diastolic congestive heart failure    Restrictive lung mechanics due to neuromuscular disease    Severe persistent extrinsic asthma without complication    Severe episode of recurrent major depressive disorder, without psychotic features   [2]   Current Outpatient Medications:     albuterol (PROVENTIL) 2.5 mg /3 mL (0.083 %) nebulizer solution, INHALE THE CONTENTS OF 1 VIAL VIA NEBULIZER EVERY 4 TO 6 HOURS AS NEEDED FOR WHEEZING OR FOR SHORTNESS OF BREATH, Disp: 1080 mL, Rfl: 3    albuterol sulfate 90 mcg/actuation aebs, Inhale 2 puffs into the lungs every 4 (four) hours as needed (WHEEZING OR SHORTNESS OF BREATH)., Disp: , Rfl:     allopurinoL (ZYLOPRIM) 100 MG tablet, TAKE 1 TABLET EVERY DAY, Disp: 90 tablet, Rfl: 3    amLODIPine (NORVASC) 2.5 MG tablet, Take 1 tablet (2.5 mg total) by mouth once daily., Disp: 90 tablet, Rfl: 3    aspirin (ECOTRIN) 81 MG EC tablet, Take 81 mg by mouth once daily., Disp: , Rfl:     atorvastatin (LIPITOR) 40 MG tablet, Take 1 tablet (40 mg total) by mouth every evening., Disp: 90 tablet, Rfl: 3    blood sugar diagnostic Strp, To check BG 3 times daily, to use with insurance preferred meter, Disp: 200 strip, Rfl: 0    blood-glucose meter (TRUE METRIX GLUCOSE METER) [facility], TO CHECK BLOOD GLUCOSE THREE TIMES DAILY, Disp: 1 each, Rfl: 0    blood-glucose meter Misc, Use as instructed to test blood glucose meter daily., Disp: 1 each, Rfl: 0    blood-glucose,,cont (DEXCOM G7 ) Misc, Use to check BG, Disp: 1 each, Rfl: 0    brinzolamide-brimonidine (SIMBRINZA) 1-0.2 % DrpS, Place 1 drop into both eyes 3 (three) times daily., Disp: 8 mL, Rfl: 3    carvediloL (COREG) 12.5 MG tablet, Take 12.5 mg by mouth 2 (two) times daily with meals., Disp: , Rfl:     " cyanocobalamin (VITAMIN B-12) 1000 MCG tablet, Take 1 tablet by mouth once daily., Disp: , Rfl:     DEXCOM G7 SENSOR Shefali, Change sensor every 10 days, Disp: 3 each, Rfl: 11    ELIQUIS 5 mg Tab, TAKE 1 TABLET TWICE DAILY, Disp: 180 tablet, Rfl: 3    empagliflozin (JARDIANCE) 25 mg tablet, Take 1 tablet (25 mg total) by mouth once daily., Disp: 90 tablet, Rfl: 3    furosemide (LASIX) 40 MG tablet, Take 40 mg by mouth every morning., Disp: , Rfl:     gabapentin (NEURONTIN) 300 MG capsule, Take 1 capsule (300 mg total) by mouth 3 (three) times daily., Disp: 90 capsule, Rfl: 11    insulin aspart U-100 (NOVOLOG U-100 INSULIN ASPART) 100 unit/mL injection, Inject 10 Units into the skin as needed for High Blood Sugar., Disp: , Rfl:     isosorbide mononitrate (IMDUR) 30 MG 24 hr tablet, Take 1 tablet (30 mg total) by mouth once daily., Disp: 30 tablet, Rfl: 11    lancets Misc, To check BG 3 times daily, to use with insurance preferred meter, Disp: 200 each, Rfl: 0    latanoprost 0.005 % ophthalmic solution, Place 1 drop into both eyes every evening., Disp: 7.5 mL, Rfl: 4    lisinopriL (PRINIVIL,ZESTRIL) 30 MG tablet, Take 1 tablet (30 mg total) by mouth once daily., Disp: 90 tablet, Rfl: 3    multivitamin-iron-folic acid (SENTRY) Tab, Take 1 tablet by mouth once daily., Disp: , Rfl:     omalizumab (XOLAIR) 150 mg/mL injection, Inject 2 mLs (300 mg total) into the skin every 14 (fourteen) days., Disp: 4 each, Rfl: 11    omalizumab (XOLAIR) 75 mg/0.5 mL injection, Inject 0.5 mLs (75 mg total) into the skin every 14 (fourteen) days., Disp: 2 each, Rfl: 11    pantoprazole (PROTONIX) 40 MG tablet, Take 1 tablet (40 mg total) by mouth once daily., Disp: 90 tablet, Rfl: 1    pen needle, diabetic (BD ULTRA-FINE SHORT PEN NEEDLE) 31 gauge x 5/16" Ndle, 1 each by Misc.(Non-Drug; Combo Route) route 3 (three) times daily., Disp: 300 each, Rfl: 3    pep injection, Inject 0.15 ml as directed   For compounding pharmacy use:  Add " PAPAVERINE 30 mg Add PHENTOLAMINE 1 mg Add ALPROSTADIL 20 mcg, Disp: 1 vial, Rfl: 5    QUEtiapine (SEROQUEL) 50 MG tablet, Take 50 mg by mouth every evening., Disp: , Rfl:     ranolazine (RANEXA) 1,000 mg Tb12, Take 1 tablet (1,000 mg total) by mouth 2 (two) times daily., Disp: 180 tablet, Rfl: 3    semaglutide (OZEMPIC) 2 mg/dose (8 mg/3 mL) PnIj, Inject 2 mg into the skin every 7 days., Disp: 9 mL, Rfl: 3    sertraline (ZOLOFT) 100 MG tablet, Take 1 tablet (100 mg total) by mouth every evening., Disp: 90 tablet, Rfl: 4    tamsulosin (FLOMAX) 0.4 mg Cap, Take 1 capsule (0.4 mg total) by mouth once daily., Disp: 30 capsule, Rfl: 11    topiramate (TOPAMAX) 25 MG tablet, Take 1 tablet (25 mg total) by mouth 2 (two) times daily., Disp: 180 tablet, Rfl: 2    traMADoL (ULTRAM) 50 mg tablet, Take 50 mg by mouth every 6 (six) hours as needed., Disp: , Rfl:     traZODone (DESYREL) 150 MG tablet, Take 1 to 2 tablets at bedtime as needed for sleep, Disp: 180 tablet, Rfl: 1    TRELEGY ELLIPTA 200-62.5-25 mcg inhaler, Inhale 1 puff into the lungs once daily., Disp: , Rfl:     VIOS AEROSOL DELIVERY SYSTEM Shefali, USE AS DIRESTED, Disp: , Rfl: 0    diclofenac sodium (VOLTAREN) 1 % Gel, Apply 2 g topically 3 (three) times daily. for 10 days, Disp: 100 g, Rfl: 0    EPINEPHrine (EPIPEN) 0.3 mg/0.3 mL AtIn, Inject 0.3 mLs (0.3 mg total) into the muscle once. for 1 dose, Disp: 2 each, Rfl: 3    methocarbamoL (ROBAXIN) 750 MG Tab, Take 1 tablet (750 mg total) by mouth nightly. for 10 days, Disp: 10 tablet, Rfl: 0    Current Facility-Administered Medications:     omalizumab injection 300 mg, 300 mg, Subcutaneous, Q28 Days, , 300 mg at 08/13/24 1025    omalizumab injection 75 mg, 75 mg, Subcutaneous, Q28 Days, , 75 mg at 08/13/24 1026

## 2025-07-14 NOTE — TELEPHONE ENCOUNTER
Noted.     Copied from CRM #[id number]. Topic: Appointments - Appointment Access  >> Jul 14, 2025 11:58 AM [place] wrote:  .Type: Appt Access       Who called:PATIENT         What is the request in detail:  PT SENT A MESSAGE EARLIER TO [facility]EL [facility] APPT EARLIER BUT WOULD LIKE TO DISREGARD THAT MESSAGE . PATIENT WILL BE ABLE TO MAKE APPT     Can the clinic reply by [facility]?           Would the patient rather a call back or a response via [facility]?        Best call back number:. .[phone]

## 2025-07-15 ENCOUNTER — OFFICE VISIT (OUTPATIENT)
Dept: CARDIOLOGY | Facility: CLINIC | Age: 70
End: 2025-07-15
Payer: MEDICARE

## 2025-07-15 VITALS — OXYGEN SATURATION: 96 % | BODY MASS INDEX: 32.1 KG/M2 | HEART RATE: 72 BPM | WEIGHT: 192.69 LBS | HEIGHT: 65 IN

## 2025-07-15 DIAGNOSIS — I20.1 CORONARY VASOSPASM: ICD-10-CM

## 2025-07-15 DIAGNOSIS — R07.89 OTHER CHEST PAIN: ICD-10-CM

## 2025-07-15 DIAGNOSIS — I27.20 PULMONARY HYPERTENSION: Primary | ICD-10-CM

## 2025-07-15 DIAGNOSIS — E11.69 HYPERLIPIDEMIA ASSOCIATED WITH TYPE 2 DIABETES MELLITUS: ICD-10-CM

## 2025-07-15 DIAGNOSIS — I65.23 BILATERAL CAROTID ARTERY STENOSIS: ICD-10-CM

## 2025-07-15 DIAGNOSIS — E78.5 HYPERLIPIDEMIA ASSOCIATED WITH TYPE 2 DIABETES MELLITUS: ICD-10-CM

## 2025-07-15 DIAGNOSIS — I48.0 PAF (PAROXYSMAL ATRIAL FIBRILLATION): Chronic | ICD-10-CM

## 2025-07-15 DIAGNOSIS — I25.118 CORONARY ARTERY DISEASE OF NATIVE ARTERY OF NATIVE HEART WITH STABLE ANGINA PECTORIS: Chronic | ICD-10-CM

## 2025-07-15 DIAGNOSIS — I50.1 CARDIAC ASTHMA: ICD-10-CM

## 2025-07-15 DIAGNOSIS — I70.0 ATHEROSCLEROSIS OF AORTA: ICD-10-CM

## 2025-07-15 DIAGNOSIS — I42.7 CARDIOMYOPATHY SECONDARY TO DRUG: ICD-10-CM

## 2025-07-15 DIAGNOSIS — I25.110 ATHEROSCLEROSIS OF NATIVE CORONARY ARTERY OF NATIVE HEART WITH UNSTABLE ANGINA PECTORIS: ICD-10-CM

## 2025-07-15 DIAGNOSIS — R79.89 ELEVATED TROPONIN: ICD-10-CM

## 2025-07-15 PROCEDURE — 3066F NEPHROPATHY DOC TX: CPT | Mod: CPTII,HCNC,S$GLB, | Performed by: INTERNAL MEDICINE

## 2025-07-15 PROCEDURE — 1160F RVW MEDS BY RX/DR IN RCRD: CPT | Mod: CPTII,HCNC,S$GLB, | Performed by: INTERNAL MEDICINE

## 2025-07-15 PROCEDURE — 4010F ACE/ARB THERAPY RXD/TAKEN: CPT | Mod: CPTII,HCNC,S$GLB, | Performed by: INTERNAL MEDICINE

## 2025-07-15 PROCEDURE — 1101F PT FALLS ASSESS-DOCD LE1/YR: CPT | Mod: CPTII,HCNC,S$GLB, | Performed by: INTERNAL MEDICINE

## 2025-07-15 PROCEDURE — 99215 OFFICE O/P EST HI 40 MIN: CPT | Mod: HCNC,S$GLB,, | Performed by: INTERNAL MEDICINE

## 2025-07-15 PROCEDURE — 1125F AMNT PAIN NOTED PAIN PRSNT: CPT | Mod: CPTII,HCNC,S$GLB, | Performed by: INTERNAL MEDICINE

## 2025-07-15 PROCEDURE — 3288F FALL RISK ASSESSMENT DOCD: CPT | Mod: CPTII,HCNC,S$GLB, | Performed by: INTERNAL MEDICINE

## 2025-07-15 PROCEDURE — 3008F BODY MASS INDEX DOCD: CPT | Mod: CPTII,HCNC,S$GLB, | Performed by: INTERNAL MEDICINE

## 2025-07-15 PROCEDURE — 99999 PR PBB SHADOW E&M-EST. PATIENT-LVL V: CPT | Mod: PBBFAC,HCNC,, | Performed by: INTERNAL MEDICINE

## 2025-07-15 PROCEDURE — 1159F MED LIST DOCD IN RCRD: CPT | Mod: CPTII,HCNC,S$GLB, | Performed by: INTERNAL MEDICINE

## 2025-07-15 PROCEDURE — 3044F HG A1C LEVEL LT 7.0%: CPT | Mod: CPTII,HCNC,S$GLB, | Performed by: INTERNAL MEDICINE

## 2025-07-15 RX ORDER — ASPIRIN 81 MG/1
81 TABLET ORAL DAILY
COMMUNITY
Start: 2025-07-15

## 2025-07-15 RX ORDER — ISOSORBIDE MONONITRATE 60 MG/1
60 TABLET, EXTENDED RELEASE ORAL DAILY
Qty: 90 TABLET | Refills: 3 | Status: SHIPPED | OUTPATIENT
Start: 2025-07-15 | End: 2026-07-15

## 2025-07-15 RX ORDER — LISINOPRIL 5 MG/1
5 TABLET ORAL DAILY
Qty: 90 TABLET | Refills: 2 | Status: SHIPPED | OUTPATIENT
Start: 2025-07-15

## 2025-07-15 NOTE — PROGRESS NOTES
Subjective:   Patient ID:  Crow Green is a 70 y.o. male who presents for follow up of Dizziness (Typically occurs in the morning )      69 yo AAM, came in for HFU  PMH CAD nonobstructive and small vessel Dz per LHC done in 11/16, PAF s/p PVI in  by Dr. Brown, CHFpEF 45%, DM, hTN, HLD and obesity. Asthma and SANDRITA on CPAP f/u with Dr. Alicea.    -11/2016, Pt was admitted to [facility] for cough with little sputum for 6 weeks. Had severe left chest pain only with cough and sitting up from the bed. No pain with exercise. Denied palpitation, dizziness, orthopnea, PND and syncope. Troponin up to 0.218 and trending down. Echo showed EF 45% and MPI showed anteroseptal positive.   Subsequent LHC showed d2 30% lesion and otherwise demi Dz.   -03/2017, he was admitted to [facility] for acute cholelithiasis, s/p lap aidan. Pt had PAF during the admisssion and has been on amiodarone and Eliquis. BNP was up to 144 from 44 done five months ago.  -05/2018 at [facility], MPI showed SPECT images at rest and stress studies showed a moderate-sized, moderate anteroseptal perfusion defect and a large, severe inferior wall perfusion defect that are fixed. The gated stress study demonstrated a left ventricular ejection fraction of 35%, and ECHo showed EF 50%. Chest CTA showed no PE.  -2018, had urine drug presumptive cocaine positive but pt declined to use cocaine.   08/2019 afib with rvr during PFT, and admitted to [facility] due to weakness and dizziness.TROPONIN 0.07 AND FLAT. Converted to sinus after cardizem and BB. F/u with Dr. Brown on 08/15 and advised PVI if recurrent Afib.   echo showed EF 45%.  S/p Afib ablation in  by Dr. Brown  Lives with his dad     visit  05 and  twice admitted for chest pain. EKG no acute stt change. troponin 0.199 and flat.    ECHO EF 40%, MPI no ischemia  C/o cp 3 times a week. Lasted for seconds at rest. With SOB, dizziness.   Pt had quick chest pain for seconds when he  was sitting in the office today.     08/2021 visit  Admitted again in  for chest pain  Drug screen cocaine positive. 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 No headache in PM.   NO recurrent rx. BP low. Visiting nurse helped to prepare the medications for 2 weeks and pt is medication compliant     visit  On scooter for few months due to hip pain and legs weakness  Breathing rx per pulm service.  No leg swelling, chest pain. Dizziness in AM. Med compliance     visit  Left shoulder pain improved after shoulder procedure and rehab  imbalance after the toe removal.   No chest angina pain. No NTG SL prn since nov 2021  SOB controlled by inhaler and breathing rx  No leg swelling. No smoking med compliance  EKG NSR and old anterior infarct    07-22 visit  Progressively more frequent angina, needs twice taking NTG SL. Occurred at rest.  No dyspnea palpitation,   Some dizziness and headachce. LDL 88 and A1c 6.4   echo EF 45% and mod LVH    07/23 visit  Run out of lasix for two weeks. Taking Lisinopril and forgot why not on entresto  BNP was 97 in 05/23 and was at 200 to 300 levels in 2022.   Had headache and blurred vision. Some sob.  Soul and back pain     12/23 visit  Sent to ER after last visit.few ER visits at [facility]. Ct showed no aorta aneurysm in chest and abd. Cocaine +  05/23 echo showed EF 50% at [facility]   Remains intermittent chest pain. Stable SOB. No palpitation faint   F/u at pain clinic and now cocaine clean   in 07/23 04/24 visit  C/o lower left chest pain for 1 week, worse with deep breathing. +tenderness.  Some chest congestion. on home O2 at night.  Caroid US showed right 80 to 90% lesions. Not receive neck cta and vascular surgery consult    11/24 visit  Some fatigue and headache. Vision change and f/u at ophthalmology for glaucoma  Left chest pain. Blurred vision  headache. Chronic lower back pain    11/24 echo EF 50% mod LVh and AV calcification  EKG NSR IVCD    Interval history  06/25 admitted to [facility] for CHF and + cocciane for worsening dyspnea/volume overload that developed after patient was reportedly off all medications for 1 week leading up to presentation. UDS was also positive for cocaine at that admission.  Patient admitted to hospital for acute on chronic HFpEF .   Patient had a stress test that was low probability for ischemia, treated with IV diuretic, and breathing treatment.  During this admission patient condition continued to improve and was safe to discharge patient home to follow-up with his PCP and other specialist.  BNP at 500   Cr 1.7  Troponin 0.2 flat and chronic  Echo EF 50% and mild Valva disorder  Yesterday  decreased  Cr 2.3 elevated             History of Present Illness    CHIEF COMPLAINT:  - Crow presents with ongoing chest pain and urinary symptoms.    HPI:  - Reports chest pain occurring at least daily and difficulty urinating  - Confirms having diarrhea  - Admitted to the hospital in 06/2025 due to dyspnea  - Uses home oxygen, though frequency or circumstances of use are not specified  - For the last 3 months, has been waking up at 3:00 AM every morning  - Sleeps with 3 pillows at night, indicating possible orthopnea  - Confirms dizziness, which may be due to low BP  - PMH includes DM, for which he takes Jardiance  - Has a history of cardiac issues, though specifics are not clearly stated  - Denies taking aspirin 81 mg    CARDIAC HISTORY:  - Echo: EF 50-55%, mild regurgitation  - Nuclear stress test: normal studies, no significant block in heart    MEDICATIONS:  - Lisinopril 30 mg daily  - Furosemide 40 mg daily  - Isosorbide 30 mg daily  - Ranexa 1000 mg 2x daily  - Atorvastatin 40 mg daily  - Jardiance for diabetes  - Home oxygen for dyspnea    TEST RESULTS:  - BMP: 600  - Cardiac troponin: ~0.2, no acute heart attack issues  -  Creatinine: 1.5, 1.2, 1.8 (due to diuretics in hospital)  - BNP: 500  - Creatinine: 1.7  - A1C: 6.5, good control of diabetes  - Creatinine: 2.3, indicating some kidney issues  - LDL cholesterol: 102, considered high    MEDICAL HISTORY:  - HTN  - Diabetes  - Kidney issues          Past Medical History:   Diagnosis Date    Arthritis     Asthma     Atrial fibrillation     Atrial fibrillation with RVR 08/07/2019    Carotid artery stenosis     CHF (congestive heart failure)     Chronic obstructive pulmonary disease 08/05/2020    Depression     Dermatomyositis     Diabetes mellitus, type 2 Diagnosed in 2000    Elevated coronary artery calcium score 02/14/2024    Elevated PSA     Follow up 07/30/2020    Hypertension     Myocardial infarction     2017    Sleep apnea        Past Surgical History:   Procedure Laterality Date    ABLATION OF ARRHYTHMOGENIC FOCUS FOR ATRIAL FIBRILLATION N/A 2/27/2020    Procedure: Ablation atrial fibrillation;  Surgeon: Gio Brown MD;  Location: [facility] EP LAB;  Service: Cardiology;  Laterality: N/A;  afib, DAMIEN (cx if SR), PVI, PITO, anes, MB, 3 Prep    CHOLECYSTECTOMY      CLOSURE OF WOUND Left 7/1/2020    Procedure: CLOSURE, WOUND;  Surgeon: Barb Veras DPM;  Location: [facility] OR;  Service: Podiatry;  Laterality: Left;    COLONOSCOPY N/A 3/4/2022    Procedure: COLONOSCOPY;  Surgeon: Yolis Freitas MD;  Location: [facility] ENDO;  Service: Endoscopy;  Laterality: N/A;    ESOPHAGOGASTRODUODENOSCOPY N/A 3/4/2022    Procedure: EGD (ESOPHAGOGASTRODUODENOSCOPY);  Surgeon: Yolis Freitas MD;  Location: [facility] ENDO;  Service: Endoscopy;  Laterality: N/A;    INJECTION OF ANESTHETIC AGENT INTO SACROILIAC JOINT Left 3/24/2021    Procedure: Left BLOCK, SACROILIAC JOINT and bilateral rhomboid TPI;  Surgeon: Dillan Tsai MD;  Location: [facility] PAIN MGT;  Service: Pain Management;  Laterality: Left;    INTRALUMINAL GASTROINTESTINAL TRACT IMAGING VIA CAPSULE N/A 3/22/2022    Procedure: IMAGING  PROCEDURE, GI TRACT, INTRALUMINAL, VIA CAPSULE;  Surgeon: Justice Martinez RN;  Location: [facility] ENDO;  Service: Endoscopy;  Laterality: N/A;    REVERSE TOTAL SHOULDER ARTHROPLASTY Left 1/10/2022    Procedure: ARTHROPLASTY, SHOULDER, TOTAL, REVERSE;  Surgeon: Anthony Alvarado MD;  Location: [facility] OR;  Service: Orthopedics;  Laterality: Left;  left reverse total shoulder arthroplasty     SELECTIVE INJECTION OF ANESTHETIC AGENT AROUND LUMBAR SPINAL NERVE ROOT BY TRANSFORAMINAL APPROACH Left 6/11/2020    Procedure: BLOCK, SPINAL NERVE ROOT, LUMBAR, SELECTIVE, TRANSFORAMINAL APPROACH Left L4-5, L5-S1 TFESI with RN IV sedation;  Surgeon: Dillan Tsai MD;  Location: [facility] PAIN MGT;  Service: Pain Management;  Laterality: Left;    TOE AMPUTATION Left 6/29/2020    Procedure: AMPUTATION, TOE;  Surgeon: Barb Veras DPM;  Location: [facility] OR;  Service: Podiatry;  Laterality: Left;  great toe       Social History[1]    Family History   Problem Relation Name Age of Onset    Hypertension Mother      Glaucoma Mother      Cataracts Mother      Diabetes Mother      Cataracts Father      Stroke Father      Glaucoma Sister 4         x3    Cataracts Sister 4         x3    Diabetes Sister 4         x1    Stroke Sister 4         x1    No Known Problems Brother 2     No Known Problems Daughter 2     No Known Problems Son 3     Glaucoma Maternal Aunt      Diabetes Maternal Aunt      Cancer Maternal Grandfather          lung (smoker)    Prostate cancer Neg Hx      Heart disease Neg Hx      Kidney disease Neg Hx           ROS    Objective:   Physical Exam  HENT:      Head: Normocephalic.   Eyes:      Pupils: Pupils are equal, round, and reactive to light.   Neck:      Thyroid: No thyromegaly.      Vascular: Normal carotid pulses. No carotid bruit or JVD.   Cardiovascular:      Rate and Rhythm: Normal rate and regular rhythm. No extrasystoles are present.     Chest Wall: PMI is not displaced.      Pulses: Normal pulses.      Heart sounds:  Normal heart sounds. No murmur heard.     No gallop. No S3 sounds.   Pulmonary:      Effort: No respiratory distress.      Breath sounds: Normal breath sounds. No stridor.   Abdominal:      General: Bowel sounds are normal.      Palpations: Abdomen is soft.      Tenderness: There is no abdominal tenderness. There is no rebound.   Musculoskeletal:      Comments:      Skin:     Findings: No rash.   Neurological:      Mental Status: He is alert and oriented to person, place, and time.   Psychiatric:         Behavior: Behavior normal.         Lab Results   Component Value Date    CHOL 172 05/21/2025    CHOL 144 11/08/2024    CHOL 117 07/08/2023     Lab Results   Component Value Date    HDL 53 05/21/2025    HDL 39 (L) 11/08/2024    HDL 36 (L) 07/08/2023     Lab Results   Component Value Date    LDLCALC 102.4 05/21/2025    LDLCALC 73.8 11/08/2024    LDLCALC 62 07/08/2023     Lab Results   Component Value Date    TRIG 83 05/21/2025    TRIG 156 (H) 11/08/2024    TRIG 97 07/08/2023     Lab Results   Component Value Date    CHOLHDL 30.8 05/21/2025    CHOLHDL 27.1 11/08/2024    CHOLHDL 28.3 06/10/2022       Chemistry        Component Value Date/Time     07/14/2025 1039     06/30/2025 0322     01/28/2025 0806    K 4.4 07/14/2025 1039    K 4.8 06/30/2025 0322    K 4.4 01/28/2025 0806     07/14/2025 1039     06/30/2025 0322     01/28/2025 0806    CO2 28 07/14/2025 1039    CO2 27 06/30/2025 0322    CO2 20 (L) 01/28/2025 0806    BUN 38 (H) 07/14/2025 1039    CREATININE 2.3 (H) 07/14/2025 1039     (H) 07/14/2025 1039    GLU 95 01/28/2025 0806        Component Value Date/Time    CALCIUM 8.5 (L) 07/14/2025 1039    CALCIUM 8.3 (L) 06/30/2025 0322    CALCIUM 8.8 01/28/2025 0806    ALKPHOS 76 07/14/2025 1039    ALKPHOS 61 12/20/2024 0427    AST 12 07/14/2025 1039    AST 14 12/20/2024 0427    ALT 12 07/14/2025 1039    ALT 14 12/20/2024 0427    BILITOT 0.2 07/14/2025 1039    BILITOT 0.3 12/20/2024  0427    ESTGFRAFRICA 48 06/30/2025 0322    ESTGFRAFRICA >60 07/31/2022 0634    EGFRNONAA >60 07/31/2022 0634          Lab Results   Component Value Date    HGBA1C 6.5 (H) 06/23/2025     Lab Results   Component Value Date    TSH 1.902 07/14/2025     Lab Results   Component Value Date    INR 1.2 06/07/2025    INR 1.0 12/19/2024    INR 1.0 10/11/2024     Lab Results   Component Value Date    WBC 7.58 07/14/2025    HGB 10.0 (L) 07/14/2025    HCT 33.3 (L) 07/14/2025    MCV 86 07/14/2025     07/14/2025     BMP  Sodium   Date Value Ref Range Status   07/14/2025 140 136 - 145 mmol/L Final   06/30/2025 140 136 - 145 mmol/L Final   01/28/2025 139 136 - 145 mmol/L Final     Potassium   Date Value Ref Range Status   07/14/2025 4.4 3.5 - 5.1 mmol/L Final   06/30/2025 4.8 3.5 - 5.1 mmol/L Final   01/28/2025 4.4 3.5 - 5.1 mmol/L Final     Chloride   Date Value Ref Range Status   07/14/2025 104 95 - 110 mmol/L Final   06/30/2025 105 100 - 109 mmol/L Final   01/28/2025 107 95 - 110 mmol/L Final     CO2   Date Value Ref Range Status   07/14/2025 28 23 - 29 mmol/L Final   01/28/2025 20 (L) 23 - 29 mmol/L Final     Carbon Dioxide   Date Value Ref Range Status   06/30/2025 27 22 - 33 mmol/L Final     BUN   Date Value Ref Range Status   07/14/2025 38 (H) 8 - 23 mg/dL Final     Creatinine   Date Value Ref Range Status   07/14/2025 2.3 (H) 0.5 - 1.4 mg/dL Final     Calcium   Date Value Ref Range Status   07/14/2025 8.5 (L) 8.7 - 10.5 mg/dL Final   06/30/2025 8.3 (L) 8.8 - 10.6 mg/dL Final   01/28/2025 8.8 8.7 - 10.5 mg/dL Final     Anion Gap   Date Value Ref Range Status   07/14/2025 8 8 - 16 mmol/L Final     eGFR if    Date Value Ref Range Status   07/31/2022 >60 >60 mL/min/1.73 m^2 Final     eGFR    Date Value Ref Range Status   06/30/2025 48 mL/min/1.73mSq Final     Comment:     In accordance with NKF-ASN Task Force recommendation, calculation based on the Chronic Kidney Disease Epidemiology  Collaboration (CKD-EPI) equation without adjustment for race. eGFR adjusted for gender and age and calculated in ml/min/1.73mSquared. eGFR cannot be calculated if patient is under 18 years of age.     Reference Range:   >= 60 ml/min/1.73mSquared.     eGFR if non    Date Value Ref Range Status   07/31/2022 >60 >60 mL/min/1.73 m^2 Final     Comment:     Calculation used to obtain the estimated glomerular filtration  rate (eGFR) is the CKD-EPI equation.        BNP  @LABRCNTIP(BNP,BNPTRIAGEBLO)@  @LABRCNTIP(troponini)@  Estimated Creatinine Clearance: 31.3 mL/min (A) (based on SCr of 2.3 mg/dL (H)).  No results found in the last 24 hours.  X-Ray Chest PA And Lateral  Result Date: 7/14/2025  Acute abnormality Electronically signed by: Shamar Kovacs MD Date:    07/14/2025 Time:    11:04    No results found in the last 24 hours.    Assessment:      1. Pulmonary hypertension- echo 7/2022    2. PAF (paroxysmal atrial fibrillation)    3. Other chest pain    4. Nonobstructive coronary artery disease of native artery of native heart    5. Hyperlipidemia associated with type 2 diabetes mellitus    6. Elevated troponin    7. Coronary vasospasm    8. Cardiomyopathy secondary to drug    9. Cardiac asthma    10. Atherosclerosis of native coronary artery of native heart with unstable angina pectoris    11. Bilateral carotid artery stenosis    12. Atherosclerosis of aorta        Plan:   Pulmonary hypertension- echo 7/2022    PAF (paroxysmal atrial fibrillation)    Other chest pain    Nonobstructive coronary artery disease of native artery of native heart    Hyperlipidemia associated with type 2 diabetes mellitus    Elevated troponin    Coronary vasospasm    Cardiomyopathy secondary to drug    Cardiac asthma    Atherosclerosis of native coronary artery of native heart with unstable angina pectoris    Bilateral carotid artery stenosis    Atherosclerosis of aorta    Other orders  -     lisinopriL (PRINIVIL,ZESTRIL) 5  MG tablet; Take 1 tablet (5 mg total) by mouth once daily.  Dispense: 90 tablet; Refill: 2  -     isosorbide mononitrate (IMDUR) 60 MG 24 hr tablet; Take 1 tablet (60 mg total) by mouth once daily.  Dispense: 90 tablet; Refill: 3  -     aspirin (ECOTRIN) 81 MG EC tablet; Take 1 tablet (81 mg total) by mouth once daily.      Assessment & Plan    IMPRESSION:  - Chest pain likely skeletal, not cardiac in origin.  - BP has improved with current medication regimen.  - Adjusted medications for better blood pressure management and pain control.    HEART FAILURE AND CARDIOVASCULAR MANAGEMENT:  - Discussed importance of aspirin for heart protection and reducing heart attack risk.  - Decreased Lisinopril from 30 mg to 5 mg daily.  - Increased Isosorbide from 30 mg to 60 mg daily.  - Continued Lasix 40 mg daily, with option to take twice daily as needed.  - Restarted aspirin 81 mg daily (OTC).  - Continued Ranexa 1000 mg twice daily.  - Continued atorvastatin 40 mg daily.    CHEST PAIN:  - Referred to pain doctor for chest pain management.    DIABETES MANAGEMENT:  - Continued Jardiance.    KIDNEY DISEASE:  - Referred to nephrologist for management of kidney issues.  - Ordered lab work to assess fluid levels.    FOLLOW-UP:  - Follow up in 3 months.           Increase Imdur to 60 mg daily for chest pain  Decrease Lisinopril from 30 mg to 5 mg to avoid low BP  Continue lasix 40 mg daily  BNP and BMP today    Resume ASA 81 mg    Rtc in 3m   This note was generated with the assistance of ambient listening technology. Verbal consent was obtained by the patient and accompanying visitor(s) for the recording of patient appointment to facilitate this note. I attest to having reviewed and edited the generated note for accuracy, though some syntax or spelling errors may persist. Please contact the author of this note for any clarification.            [1]   Social History  Tobacco Use    Smoking status: Never    Smokeless tobacco: Never    Substance Use Topics    Alcohol use: Yes     Comment: rare, maybe holidays    Drug use: Not Currently     Types: Cocaine

## 2025-07-16 ENCOUNTER — OUTPATIENT CASE MANAGEMENT (OUTPATIENT)
Dept: ADMINISTRATIVE | Facility: OTHER | Age: 70
End: 2025-07-16
Payer: MEDICARE

## 2025-07-16 NOTE — PROGRESS NOTES
Outpatient Care Management  Plan of Care Follow Up Visit    Patient: Crow Green  MRN: [id number]  Date of Service: 07/16/2025  Completed by: Janette Mena RN  Referral Date: 06/23/2025    Reason for Visit   Patient presents with    OPCM RN Follow Up Call       Brief Summary: OPCM RN followed up with Mr. Maria today for care plan review.    Next Steps:   Mr. Maria agreed to OPCM RN follow up on or around 8/6/25.  Reassess pain.  Reassess headaches.  Reassess falls.  Follow up regarding use of alternative pain relief methods.  Mr. Maria agreed to try alternative pain relief methods (such as applying heat) before our next follow up.

## 2025-07-18 RX ORDER — CALCIUM CITRATE/VITAMIN D3 200MG-6.25
TABLET ORAL
Qty: 200 STRIP | Refills: 0 | OUTPATIENT
Start: 2025-07-18

## 2025-07-18 NOTE — TELEPHONE ENCOUNTER
Refill Routing Note   Medication(s) are not appropriate for processing by [facility] for the following reason(s):        ED/Hospital Visit since last OV with provider  No active prescription written by provider    [facility] action(s):  Defer           Extended chart review required: Yes     Appointments  past 12m or future 3m with PCP    Date Provider   Last Visit   11/12/2024 Cm Polanco MD   Next Visit   9/23/2025 Cm Polanco MD   ED visits in past 90 days: 0        Note composed:2:44 PM 07/18/2025

## 2025-07-18 NOTE — TELEPHONE ENCOUNTER
No care due was identified.  [facility] Embedded Care Due Messages. Reference number: [id number].   7/18/2025 2:30:41 PM CDT

## 2025-07-18 NOTE — TELEPHONE ENCOUNTER
Copied from CRM #[id number]. Topic: General Inquiry - Patient Advice  >> Jul 18, 2025 12:05 PM Arianna wrote:  Type:  Needs Medical Advice    Who Called: Crow Green  Would the patient rather a call back or a response via [facility]? Call back  Best Call Back Number: [phone]  Additional Information: the patient would like call back. He's dexcom isn't working.

## 2025-07-22 ENCOUNTER — TELEPHONE (OUTPATIENT)
Dept: PODIATRY | Facility: CLINIC | Age: 70
End: 2025-07-22
Payer: MEDICARE

## 2025-07-22 ENCOUNTER — TELEPHONE (OUTPATIENT)
Dept: INTERNAL MEDICINE | Facility: CLINIC | Age: 70
End: 2025-07-22
Payer: MEDICARE

## 2025-07-22 NOTE — TELEPHONE ENCOUNTER
Contacted pt to return call. Pt advised that he was calling about shoes he was supposed to receive from podiatry. I advised pt that he would need to call podiatry about the order as we hadn't received anything from podiatry. Pt advised he would call them and disconnected the call.

## 2025-07-22 NOTE — TELEPHONE ENCOUNTER
Spoke with the patient. I let him know I would forward the message to Dr. Veras staff.     Copied from CRM #[id number]. Topic: General Inquiry - Patient Advice  >> Jul 22, 2025 10:54 AM Balbina wrote:  .Type: Patient Call Back        Who called:      PATIENT  What is the request in detail:    CALLED IN CONCERNING DIABETIC SHOES  Can the clinic reply by [facility]?           Would the patient rather a call back or a response via [facility]?   CALL      Best call back number:  [phone]

## 2025-07-22 NOTE — TELEPHONE ENCOUNTER
Copied from CRM #[id number]. Topic: General Inquiry - Return Call  >> Jul 22, 2025  9:13 AM Jaquelin wrote:  .Type:  Patient  Call    Who Called:Crow  Does the patient know what this is regarding?:pt needs a nurse to reach back out to him  Would the patient rather a call back or a response via [facility]? Call back  Best Call Back Number:.[phone]   Additional Information:

## 2025-07-23 ENCOUNTER — OUTPATIENT CASE MANAGEMENT (OUTPATIENT)
Dept: ADMINISTRATIVE | Facility: OTHER | Age: 70
End: 2025-07-23
Payer: MEDICARE

## 2025-07-23 ENCOUNTER — TELEPHONE (OUTPATIENT)
Dept: PODIATRY | Facility: CLINIC | Age: 70
End: 2025-07-23
Payer: MEDICARE

## 2025-07-23 NOTE — TELEPHONE ENCOUNTER
I spoke to pt to inform him that I put a note on Dr. Veras desjaclyn to send in the shoe prescription and I will fax the shoe prescription over to [facility] and they will contact him.      Copied from CRM #[id number]. Topic: General Inquiry - Return Call  >> Jul 22, 2025  9:15 AM Jaquelin wrote:  .Type:  Patient  Call    Who Called:Crow  Does the patient know what this is regarding?:pt called to speak with the nurse about his shoes he mentioned that he have spoke with someone several of times to get the referral sent over for his shoes but haven't anything about it   Would the patient rather a call back or a response via [facility]? Call back  Best Call Back Number:[phone]  Additional Information:

## 2025-07-23 NOTE — PROGRESS NOTES
Outpatient Care Management   - Care Plan Follow Up    Patient: Crow Green  MRN:  [id number]  Date of Service:  7/23/2025  Completed by:  Krista Ely LCSW  Referral Date: 06/23/2025    Reason for Visit   Patient presents with    OPCM SW Follow Up Call     7/23/2025    Goals Complete     7/23/2025       Brief Summary: Spoke with patient who reports he is still waiting on the state waiver program decision. [facility] his [facility] [place] personal home care benefit, patient requested the pH# again. SW explained the program in detail per the insurance benefits summary. Patient denies any further needs at this time. Goals complete and updated OPCM RN.    Complex Care Plan     Care plan was discussed and completed today with input from patient and/or caregiver.    Patient Instructions     No follow-ups on file.

## 2025-07-25 ENCOUNTER — OFFICE VISIT (OUTPATIENT)
Dept: PSYCHIATRY | Facility: CLINIC | Age: 70
End: 2025-07-25
Payer: MEDICARE

## 2025-07-25 ENCOUNTER — TELEPHONE (OUTPATIENT)
Dept: INTERNAL MEDICINE | Facility: CLINIC | Age: 70
End: 2025-07-25
Payer: MEDICARE

## 2025-07-25 DIAGNOSIS — F32.A CHRONIC DEPRESSION: Primary | ICD-10-CM

## 2025-07-25 DIAGNOSIS — L84 PRE-ULCERATIVE CALLUSES: ICD-10-CM

## 2025-07-25 DIAGNOSIS — S98.112A AMPUTATED GREAT TOE, LEFT: ICD-10-CM

## 2025-07-25 DIAGNOSIS — E11.49 TYPE II DIABETES MELLITUS WITH NEUROLOGICAL MANIFESTATIONS: Primary | ICD-10-CM

## 2025-07-25 PROCEDURE — 3044F HG A1C LEVEL LT 7.0%: CPT | Mod: CPTII,HCNC,S$GLB, | Performed by: PSYCHIATRY & NEUROLOGY

## 2025-07-25 PROCEDURE — 3066F NEPHROPATHY DOC TX: CPT | Mod: CPTII,HCNC,S$GLB, | Performed by: PSYCHIATRY & NEUROLOGY

## 2025-07-25 PROCEDURE — 99211 OFF/OP EST MAY X REQ PHY/QHP: CPT | Mod: PBBFAC | Performed by: PSYCHIATRY & NEUROLOGY

## 2025-07-25 PROCEDURE — 4010F ACE/ARB THERAPY RXD/TAKEN: CPT | Mod: CPTII,HCNC,S$GLB, | Performed by: PSYCHIATRY & NEUROLOGY

## 2025-07-25 PROCEDURE — 99999 PR PBB SHADOW E&M-EST. PATIENT-LVL I: CPT | Mod: PBBFAC,HCNC,, | Performed by: PSYCHIATRY & NEUROLOGY

## 2025-07-25 PROCEDURE — 99214 OFFICE O/P EST MOD 30 MIN: CPT | Mod: HCNC,S$GLB,, | Performed by: PSYCHIATRY & NEUROLOGY

## 2025-07-25 RX ORDER — TRAZODONE HYDROCHLORIDE 150 MG/1
TABLET ORAL
Qty: 180 TABLET | Refills: 1 | Status: SHIPPED | OUTPATIENT
Start: 2025-07-25

## 2025-07-25 NOTE — PROGRESS NOTES
"Outpatient Psychiatry Initial Visit (MD/NP)    7/25/2025    Crow Green, a 70 y.o. male, presenting for follow-up visit. Met with patient.    Reason for Encounter: follow-up, depression    History of Present Illness:     Patient presents for follow-up of major depression, insomnia, and reports recent hospitalizations for congestive heart failure, expressing concerns about sleep issues and medication management. Patient reports ongoing sleep problems, including difficulty falling asleep and maintaining sleep. He wakes up at 3:00 AM and 6:00 AM regularly, getting about three hours of initial sleep, followed by another four hours after waking up at 3:00 AM. He typically does not return to sleep until around 8:00 AM. Patient expresses frustration with his sleep pattern, stating, "I don't have any get up and go at all."    Patient reports being hospitalized twice since his last visit for congestive heart failure, experiencing difficulty breathing and urinary symptoms. Both hospitalizations occurred at "[facility]. Patient mentions current chest pain, stating, "I've been having pain right up in here."    Patient is experiencing significant issues with medication management. He reports not taking his medications since Tuesday (it's currently Friday) due to the absence of the nurse who usually arranges his pills. Patient takes over 40 pills daily and relies on the nurse to organize them weekly. This disruption in medication routine is causing distress and potentially impacting his overall health management.    Patient describes ongoing depression symptoms, characterized by social isolation and lack of interest in activities. He states, "I don't go anywhere. Just sit at home day after day." Patient only leaves home for necessary errands like picking up medicine or doctor appointments. He expresses frustration with his living situation, particularly his roommate's habits.    Patient acknowledges not eating " "regularly or following dietary recommendations. He states, "I might not eat until about 4 or 5 o'clock," and admits to not drinking water as advised. This irregular eating pattern may be impacting his overall health and potentially exacerbating his heart condition.    While the patient admits to sometimes feeling like he does not want to be alive, he firmly denies any thoughts of taking his own life.    MEDICATIONS:  Patient is on Sertraline orally for depression and Gabapentin orally for pain in his hands and feet.    SUBSTANCE USE:  Patient has a cocaine use disorder, which is currently in remission.    LIFESTYLE:  Patient lives at home and recently had the whole house renovated, including repairs to walls, ceiling, and floors. He is planning to move back into his room once he can purchase a bed. For hobbies and interests, he watches television, though he mentions that it's "getting on my nerves". Regarding social activities, the patient rarely leaves the house except for doctor appointments or to  medicine. He expresses feeling isolated, stating "I don't go anywhere. Just sit at home day after day." Patient also mentions not liking to ask others for help.         Review Of Systems:     GENERAL:  No weight gain or loss  SKIN:  No rashes or lacerations  HEAD:  No headaches  EYES:  No exophthalmos, jaundice or blindness  EARS:  No dizziness, tinnitus or hearing loss  NOSE:  No changes in smell  MOUTH & THROAT:  No dyskinetic movements or obvious goiter  CHEST:  No shortness of breath, hyperventilation or cough  CARDIOVASCULAR:  No tachycardia or chest pain  ABDOMEN:  No nausea, vomiting, pain, constipation or diarrhea  URINARY:  No frequency, dysuria or sexual dysfunction  ENDOCRINE:  No polydipsia, polyuria  MUSCULOSKELETAL:  No pain or stiffness of the joints  NEUROLOGIC:  No weakness, sensory changes, seizures, confusion, memory loss, tremor or other abnormal movements    Current Evaluation: "     Nutritional Screening: Considering the patient's height and weight, medications, medical history and preferences, should a referral be made to the dietitian? no    Constitutional  Vitals:  Most recent vital signs, dated less than 90 days prior to this appointment, were reviewed.    There were no vitals filed for this visit.     General:  unremarkable, age appropriate     Musculoskeletal  Muscle Strength/Tone:  no tremor, no tic   Gait & Station:  non-ataxic     Psychiatric  Appearance: casually dressed & groomed;   Behavior: calm,   Cooperation: cooperative with assessment  Speech: normal rate, volume, tone  Thought Process: linear, goal-directed  Thought Content: No suicidal or homicidal ideation; no delusions  Affect: normal range  Mood: euthymic  Perceptions: No auditory or visual hallucinations  Level of Consciousness: alert throughout interview  Insight: fair  Cognition: Oriented to person, place, time, & situation  Memory: no apparent deficits to general clinical interview; not formally assessed  Attention/Concentration: no apparent deficits to general clinical interview; not formally assessed  Fund of Knowledge: average by vocabulary/education    Laboratory Data  Lab Visit on 07/14/2025   Component Date Value Ref Range Status    Sodium 07/14/2025 140  136 - 145 mmol/L Final    Potassium 07/14/2025 4.4  3.5 - 5.1 mmol/L Final    Chloride 07/14/2025 104  95 - 110 mmol/L Final    CO2 07/14/2025 28  23 - 29 mmol/L Final    Glucose 07/14/2025 135 (H)  70 - 110 mg/dL Final    BUN 07/14/2025 38 (H)  8 - 23 mg/dL Final    Creatinine 07/14/2025 2.3 (H)  0.5 - 1.4 mg/dL Final    Calcium 07/14/2025 8.5 (L)  8.7 - 10.5 mg/dL Final    Protein Total 07/14/2025 7.2  6.0 - 8.4 gm/dL Final    Albumin 07/14/2025 3.3 (L)  3.5 - 5.2 g/dL Final    Bilirubin Total 07/14/2025 0.2  0.1 - 1.0 mg/dL Final    ALP 07/14/2025 76  40 - 150 unit/L Final    AST 07/14/2025 12  11 - 45 unit/L Final    ALT 07/14/2025 12  10 - 44 unit/L  Final    Anion Gap 07/14/2025 8  8 - 16 mmol/L Final    eGFR 07/14/2025 30 (L)  >60 mL/min/1.73/m2 Final    TSH 07/14/2025 1.902  0.400 - 4.000 uIU/mL Final    BNP 07/14/2025 222 (H)  0 - 99 pg/mL Final    WBC 07/14/2025 7.58  3.90 - 12.70 K/uL Final    RBC 07/14/2025 3.88 (L)  4.60 - 6.20 M/uL Final    HGB 07/14/2025 10.0 (L)  14.0 - 18.0 gm/dL Final    HCT 07/14/2025 33.3 (L)  40.0 - 54.0 % Final    MCV 07/14/2025 86  82 - 98 fL Final    MCH 07/14/2025 25.8 (L)  27.0 - 31.0 pg Final    MCHC 07/14/2025 30.0 (L)  32.0 - 36.0 g/dL Final    RDW 07/14/2025 18.9 (H)  11.5 - 14.5 % Final    Platelet Count 07/14/2025 165  150 - 450 K/uL Final    MPV 07/14/2025    Final    Nucleated RBC 07/14/2025 0  <=0 /100 WBC Final    Neut % 07/14/2025 66.8  38 - 73 % Final    Lymph % 07/14/2025 21.5  18 - 48 % Final    Mono % 07/14/2025 9.6  4 - 15 % Final    Eos % 07/14/2025 1.7  <=8 % Final    Basophil % 07/14/2025 0.1  <=1.9 % Final    Imm Grans % 07/14/2025 0.3  0.0 - 0.5 % Final    Neut # 07/14/2025 5.06  1.8 - 7.7 K/uL Final    Lymph # 07/14/2025 1.63  1 - 4.8 K/uL Final    Mono # 07/14/2025 0.73  0.3 - 1 K/uL Final    Eos # 07/14/2025 0.13  <=0.5 K/uL Final    Baso # 07/14/2025 0.01  <=0.2 K/uL Final    Imm Grans # 07/14/2025 0.02  0.00 - 0.04 K/uL Final       Medications  Encounter Medications[1]    Assessment - Diagnosis - Goals:     Impression: 70 year old man with history of depression, cocaine use disorder in remission, social isolation, cluster b personality traits.     Treatment Plan/Recommendations:    Depression  Insomnia  Cocaine use disorder, in remission    Acute on chronic diastolic (congestive) heart failure  Chest pain, unspecified  Cocaine dependence, in remission  Pain in left upper arm  Pain in right lower leg  Social exclusion and rejection    Assessed ongoing issues with insomnia and depression, noting potential exacerbation due to recent hospitalizations for congestive heart failure.  Evaluated medication  adherence concerns, particularly related to inconsistent nurse visits for medication management.  Considered impact of multiple health issues, including pain and heart failure, on overall well-being and sleep quality.  Determined need for continued antidepressant therapy and sleep medication.    DEPRESSION, UNSPECIFIED:  1. Continue sertraline 50 mg daily for depression.    LIFESTYLE CHANGES:  1. Patient to contact [facility] to address issues with home health nurse visits and medication management.  2. Patient to communicate with the nurse during today's visit to understand what went wrong with missed visits and how to prevent future occurrences.  3. Patient to use the provided internal medicine department number to reach out to Ms. Twyla Sebastian regarding Dexcom (glucose monitor) activation issues.  4. Follow up at the end of November.  5. Contact the office if needing to reschedule the appointment.          Return to Clinic: 2 months    CLEOPATRA Bustillos MD  Psychiatry  [facility]   [id number] [place] [address].  [phone]    This note was generated with the assistance of ambient listening technology. Verbal consent was obtained by the patient and accompanying visitor(s) for the recording of patient appointment to facilitate this note. I attest to having reviewed and edited the generated note for accuracy, though some syntax or spelling errors may persist. Please contact the author of this note for any clarification.              [1]   Outpatient Encounter Medications as of 7/25/2025   Medication Sig Dispense Refill    albuterol (PROVENTIL) 2.5 mg /3 mL (0.083 %) nebulizer solution INHALE THE CONTENTS OF 1 VIAL VIA NEBULIZER EVERY 4 TO 6 HOURS AS NEEDED FOR WHEEZING OR FOR SHORTNESS OF BREATH 1080 mL 3    albuterol sulfate 90 mcg/actuation aebs Inhale 2 puffs into the lungs every 4 (four) hours as needed (WHEEZING OR SHORTNESS OF BREATH).      allopurinoL (ZYLOPRIM) 100 MG tablet TAKE 1 TABLET EVERY  DAY 90 tablet 3    amLODIPine (NORVASC) 2.5 MG tablet Take 1 tablet (2.5 mg total) by mouth once daily. 90 tablet 3    [] amoxicillin (AMOXIL) 875 MG tablet Take 1 tablet (875 mg total) by mouth 2 (two) times daily. for 7 days 14 tablet 0    aspirin (ECOTRIN) 81 MG EC tablet Take 1 tablet (81 mg total) by mouth once daily.      atorvastatin (LIPITOR) 40 MG tablet Take 1 tablet (40 mg total) by mouth every evening. 90 tablet 3    blood sugar diagnostic Strp To check BG 3 times daily, to use with insurance preferred meter 200 strip 0    blood-glucose meter (TRUE METRIX GLUCOSE METER) Misc TO CHECK BLOOD GLUCOSE THREE TIMES DAILY 1 each 0    blood-glucose meter Misc Use as instructed to test blood glucose meter daily. 1 each 0    blood-glucose,,cont (DEXCOM G7 ) Misc Use to check BG 1 each 0    brinzolamide-brimonidine (SIMBRINZA) 1-0.2 % DrpS Place 1 drop into both eyes 3 (three) times daily. 8 mL 3    carvediloL (COREG) 12.5 MG tablet Take 12.5 mg by mouth 2 (two) times daily with meals.      cyanocobalamin (VITAMIN B-12) 1000 MCG tablet Take 1 tablet by mouth once daily. (Patient not taking: Reported on 7/15/2025)      DEXCOM G7 SENSOR Shefali Change sensor every 10 days 3 each 11    diclofenac sodium (VOLTAREN) 1 % Gel Apply 2 g topically 3 (three) times daily. for 10 days 100 g 0    ELIQUIS 5 mg Tab TAKE 1 TABLET TWICE DAILY 180 tablet 3    empagliflozin (JARDIANCE) 25 mg tablet Take 1 tablet (25 mg total) by mouth once daily. 90 tablet 3    EPINEPHrine (EPIPEN) 0.3 mg/0.3 mL AtIn Inject 0.3 mLs (0.3 mg total) into the muscle once. for 1 dose 2 each 3    furosemide (LASIX) 40 MG tablet Take 40 mg by mouth every morning.      gabapentin (NEURONTIN) 300 MG capsule Take 1 capsule (300 mg total) by mouth 3 (three) times daily. 90 capsule 11    insulin aspart U-100 (NOVOLOG U-100 INSULIN ASPART) 100 unit/mL injection Inject 10 Units into the skin as needed for High Blood Sugar.      isosorbide  "mononitrate (IMDUR) 60 MG 24 hr tablet Take 1 tablet (60 mg total) by mouth once daily. 90 tablet 3    lancets Misc To check BG 3 times daily, to use with insurance preferred meter 200 each 0    latanoprost 0.005 % ophthalmic solution Place 1 drop into both eyes every evening. 7.5 mL 4    lisinopriL (PRINIVIL,ZESTRIL) 5 MG tablet Take 1 tablet (5 mg total) by mouth once daily. 90 tablet 2    [] methocarbamoL (ROBAXIN) 750 MG Tab Take 1 tablet (750 mg total) by mouth nightly. for 10 days 10 tablet 0    multivitamin-iron-folic acid (SENTRY) Tab Take 1 tablet by mouth once daily.      omalizumab (XOLAIR) 150 mg/mL injection Inject 2 mLs (300 mg total) into the skin every 14 (fourteen) days. 4 each 11    omalizumab (XOLAIR) 75 mg/0.5 mL injection Inject 0.5 mLs (75 mg total) into the skin every 14 (fourteen) days. 2 each 11    pantoprazole (PROTONIX) 40 MG tablet Take 1 tablet (40 mg total) by mouth once daily. 90 tablet 1    pen needle, diabetic (BD ULTRA-FINE SHORT PEN NEEDLE) 31 gauge x 5/16" Ndle 1 each by Misc.(Non-Drug; Combo Route) route 3 (three) times daily. 300 each 3    pep injection Inject 0.15 ml as directed     For compounding pharmacy use:   Add PAPAVERINE 30 mg  Add PHENTOLAMINE 1 mg  Add ALPROSTADIL 20 mcg 1 vial 5    QUEtiapine (SEROQUEL) 50 MG tablet Take 50 mg by mouth every evening.      ranolazine (RANEXA) 1,000 mg Tb12 Take 1 tablet (1,000 mg total) by mouth 2 (two) times daily. 180 tablet 3    semaglutide (OZEMPIC) 2 mg/dose (8 mg/3 mL) PnIj Inject 2 mg into the skin every 7 days. 9 mL 3    sertraline (ZOLOFT) 100 MG tablet Take 1 tablet (100 mg total) by mouth every evening. 90 tablet 4    tamsulosin (FLOMAX) 0.4 mg Cap Take 1 capsule (0.4 mg total) by mouth once daily. 30 capsule 11    topiramate (TOPAMAX) 25 MG tablet Take 1 tablet (25 mg total) by mouth 2 (two) times daily. 180 tablet 2    traMADoL (ULTRAM) 50 mg tablet Take 50 mg by mouth every 6 (six) hours as needed. (Patient not " taking: Reported on 7/15/2025)      traZODone (DESYREL) 150 MG tablet Take 1 to 2 tablets at bedtime as needed for sleep 180 tablet 1    TRELEGY ELLIPTA 200-62.5-25 mcg inhaler Inhale 1 puff into the lungs once daily.      VIOS AEROSOL DELIVERY SYSTEM Shefali USE AS DIRESTED  0    [DISCONTINUED] aspirin (ECOTRIN) 81 MG EC tablet Take 81 mg by mouth once daily. (Patient not taking: Reported on 7/15/2025)      [DISCONTINUED] diclofenac sodium (VOLTAREN) 1 % Gel APPLY 2 GRAMS TOPICALLY FOUR TIMES DAILY (Patient not taking: Reported on 10/11/2024) 600 g 2    [DISCONTINUED] isosorbide mononitrate (IMDUR) 30 MG 24 hr tablet Take 1 tablet (30 mg total) by mouth once daily. 30 tablet 11    [DISCONTINUED] lisinopriL (PRINIVIL,ZESTRIL) 30 MG tablet Take 1 tablet (30 mg total) by mouth once daily. 90 tablet 3    [DISCONTINUED] traZODone (DESYREL) 150 MG tablet Take 1 to 2 tablets at bedtime as needed for sleep 180 tablet 1     Facility-Administered Encounter Medications as of 7/25/2025   Medication Dose Route Frequency Provider Last Rate Last Admin    omalizumab injection 300 mg  300 mg Subcutaneous Q28 Days    300 mg at 08/13/24 1025    omalizumab injection 75 mg  75 mg Subcutaneous Q28 Days    75 mg at 08/13/24 1026    [DISCONTINUED] GENERIC EXTERNAL MEDICATION     Provider, Generic External Data

## 2025-08-01 ENCOUNTER — OFFICE VISIT (OUTPATIENT)
Dept: DIABETES | Facility: CLINIC | Age: 70
End: 2025-08-01
Payer: MEDICARE

## 2025-08-01 ENCOUNTER — OFFICE VISIT (OUTPATIENT)
Dept: PULMONOLOGY | Facility: CLINIC | Age: 70
End: 2025-08-01
Payer: MEDICARE

## 2025-08-01 VITALS
HEART RATE: 78 BPM | RESPIRATION RATE: 18 BRPM | WEIGHT: 176.13 LBS | SYSTOLIC BLOOD PRESSURE: 150 MMHG | SYSTOLIC BLOOD PRESSURE: 150 MMHG | BODY MASS INDEX: 29.46 KG/M2 | DIASTOLIC BLOOD PRESSURE: 80 MMHG | HEART RATE: 92 BPM | BODY MASS INDEX: 29.31 KG/M2 | HEIGHT: 65 IN | WEIGHT: 176.81 LBS | OXYGEN SATURATION: 95 % | DIASTOLIC BLOOD PRESSURE: 80 MMHG | OXYGEN SATURATION: 99 %

## 2025-08-01 DIAGNOSIS — E78.5 HYPERLIPIDEMIA ASSOCIATED WITH TYPE 2 DIABETES MELLITUS: ICD-10-CM

## 2025-08-01 DIAGNOSIS — E11.3312 MODERATE NONPROLIFERATIVE DIABETIC RETINOPATHY OF LEFT EYE WITH MACULAR EDEMA ASSOCIATED WITH TYPE 2 DIABETES MELLITUS: ICD-10-CM

## 2025-08-01 DIAGNOSIS — J45.40 MODERATE PERSISTENT ASTHMA WITHOUT COMPLICATION: ICD-10-CM

## 2025-08-01 DIAGNOSIS — E11.22 CKD STAGE 3 DUE TO TYPE 2 DIABETES MELLITUS: ICD-10-CM

## 2025-08-01 DIAGNOSIS — G47.33 OSA (OBSTRUCTIVE SLEEP APNEA): ICD-10-CM

## 2025-08-01 DIAGNOSIS — G70.9 RESTRICTIVE LUNG MECHANICS DUE TO NEUROMUSCULAR DISEASE: Primary | ICD-10-CM

## 2025-08-01 DIAGNOSIS — E66.811 OBESITY (BMI 30.0-34.9): ICD-10-CM

## 2025-08-01 DIAGNOSIS — I15.2 HYPERTENSION ASSOCIATED WITH DIABETES: ICD-10-CM

## 2025-08-01 DIAGNOSIS — E11.69 HYPERLIPIDEMIA ASSOCIATED WITH TYPE 2 DIABETES MELLITUS: ICD-10-CM

## 2025-08-01 DIAGNOSIS — J43.2 CENTRILOBULAR EMPHYSEMA: ICD-10-CM

## 2025-08-01 DIAGNOSIS — N18.30 CKD STAGE 3 DUE TO TYPE 2 DIABETES MELLITUS: ICD-10-CM

## 2025-08-01 DIAGNOSIS — J98.4 RESTRICTIVE LUNG MECHANICS DUE TO NEUROMUSCULAR DISEASE: Primary | ICD-10-CM

## 2025-08-01 DIAGNOSIS — E11.59 HYPERTENSION ASSOCIATED WITH DIABETES: ICD-10-CM

## 2025-08-01 DIAGNOSIS — Z79.4 TYPE 2 DIABETES MELLITUS WITH STAGE 3A CHRONIC KIDNEY DISEASE, WITH LONG-TERM CURRENT USE OF INSULIN: Primary | ICD-10-CM

## 2025-08-01 DIAGNOSIS — I25.118 CORONARY ARTERY DISEASE OF NATIVE ARTERY OF NATIVE HEART WITH STABLE ANGINA PECTORIS: ICD-10-CM

## 2025-08-01 DIAGNOSIS — E11.42 DIABETIC POLYNEUROPATHY ASSOCIATED WITH TYPE 2 DIABETES MELLITUS: ICD-10-CM

## 2025-08-01 DIAGNOSIS — E11.22 TYPE 2 DIABETES MELLITUS WITH STAGE 3A CHRONIC KIDNEY DISEASE, WITH LONG-TERM CURRENT USE OF INSULIN: Primary | ICD-10-CM

## 2025-08-01 DIAGNOSIS — N18.31 TYPE 2 DIABETES MELLITUS WITH STAGE 3A CHRONIC KIDNEY DISEASE, WITH LONG-TERM CURRENT USE OF INSULIN: Primary | ICD-10-CM

## 2025-08-01 DIAGNOSIS — I50.32 CHRONIC DIASTOLIC HEART FAILURE: ICD-10-CM

## 2025-08-01 LAB — GLUCOSE SERPL-MCNC: 87 MG/DL (ref 70–110)

## 2025-08-01 PROCEDURE — 99999 PR PBB SHADOW E&M-EST. PATIENT-LVL V: CPT | Mod: PBBFAC,HCNC,, | Performed by: STUDENT IN AN ORGANIZED HEALTH CARE EDUCATION/TRAINING PROGRAM

## 2025-08-01 PROCEDURE — 99999 PR PBB SHADOW E&M-EST. PATIENT-LVL V: CPT | Mod: PBBFAC,HCNC,, | Performed by: PHYSICIAN ASSISTANT

## 2025-08-01 NOTE — PROGRESS NOTES
Chief complaint:  Chief Complaint   Patient presents with    Follow-up      Active problems   Restrictive lung disease likely secondary to neuromuscular disease (dermatomyositis)  Allergic T2 high asthma with elevated IgE on Xolair therapy  CKD  Paroxysmal Afib  Heart failure with preserved ejection fraction  Hypertension  Nocturnal hypoxemia    History of present illness -  HPI   Crow is an established clinic patient is since 2021 for allergic asthma on biologic therapy with Xolair by Allergy, who has seen almost all the providers in the clinic lastly Dr. Vazquez.  He also has a history of dermatomyositis for which he has not seen Rheumatology in awhile  He was recently admitted to the hospital for [facility] common diagnostics were unremarkable therefore this is likely progression of his CKD      Interval history   08/01/2025   Comes back after hospital follow-up admitted at [facility] for acute decompensated heart failure, requiring IV diuretics and a short hospital stay.    Patient's breathing is still not back to baseline, still recovering from his heart failure exacerbation.  Did not complete spirometry and 6 minute walk test that we ordered during the last visit neither the CT scan of the chest.  Reportedly compliant with Trelegy and rescue inhaler as well as Xolair injections    11/22/2024  Patient is here mostly for hospital follow-up, feeling better, no complaints, stable from a respiratory standpoint.  Tells me that his CPAP machine is not working.    08/28/2024  Saw Dr. Vazquez for asthma continued inhaler use with Trelegy and was supposed to get repeat IgE..    05/15/2024  Came to clinic for nebulizer orders and medication refill    Physical examination -   Physical Exam  Vitals and nursing note reviewed.   Constitutional:       Appearance: Normal appearance.   HENT:      Head: Normocephalic and atraumatic.      Nose: Nose normal.      Mouth/Throat:      Mouth: Mucous membranes are moist.   Eyes:       "Extraocular Movements: Extraocular movements intact.      Pupils: Pupils are equal, round, and reactive to light.   Cardiovascular:      Rate and Rhythm: Normal rate and regular rhythm.   Pulmonary:      Effort: Pulmonary effort is normal.      Comments: Diminished breath sounds bilaterally  Abdominal:      General: Abdomen is flat. Bowel sounds are normal.      Palpations: Abdomen is soft.   Musculoskeletal:         General: No swelling.   Skin:     General: Skin is warm.      Capillary Refill: Capillary refill takes less than 2 seconds.   Neurological:      General: No focal deficit present.      Mental Status: He is alert.        Vitals:    08/01/25 0906   BP: (!) 150/80   Pulse: 92   Resp: 18   SpO2: 95%   Weight: 80.2 kg (176 lb 12.9 oz)   Height: 5' 5" (1.651 m)      Review of Systems   Constitutional: Negative.    HENT: Negative.     Eyes: Negative.    Respiratory:  Positive for shortness of breath.    Cardiovascular: Negative.    Gastrointestinal: Negative.    Musculoskeletal: Negative.    Skin: Negative.    Neurological: Negative.        Relevant data (Independently reviewed by me) -    Prior notes -   Hospital notes from June admission    Spirometry -  Spirometry from 01/25/2023 consistent with moderate restriction with a ratio of 80, FVC of 55% of predicted, FEV1 of 56% of predicted, vital capacity of 60% of predicted and a TLC of 68% of predicted, his DLCO is low but corrects for alveolar ventilation    Imaging -   06/25/2025 CT scan of the chest performed at [facility] shows interstitial thickening consistent with a pulmonary edema and small pleural effusions.    Assessment & Plan    Restrictive lung mechanics due to neuromuscular disease  -     Complete PFT with bronchodilator; Future; Expected date: 02/01/2026  -     MAXIMAL INSPIRATORY/EXPIRATORY PRESSURE; Future; Expected date: 02/01/2026  -     PULSE OXIMETRY OVERNIGHT; Future    Moderate persistent asthma without " complication    Centrilobular emphysema  -     PULSE OXIMETRY OVERNIGHT; Future    SANDRITA (obstructive sleep apnea)      Patient is still recovering from an exacerbation of heart failure.    Continue with his Trelegy as needed Barbara and Xolair for his asthma.    Repeat spirometry and maximal inspiratory and expiratory pressure to determine the etiology of restrictive lung disease.  Overnight oximetry study to ensure CPAP is controlling hypoxia     RTC in 6 months    Max Landin   08/01/2025

## 2025-08-01 NOTE — PROGRESS NOTES
PCP: Cm Polanco MD    Subjective:     Chief Complaint: Diabetes - Established Patient    HISTORY OF PRESENT ILLNESS: 70 y.o.   male presenting for diabetes management visit.   The patient's last visit with me was on 12/16/2024.   Patient has issues with transportation.   Patient has had Type II diabetes since 2000.  Pertinent to decision making is the following comorbidities: HTN, HLD, CAD, CHF, CKD III, Obesity by BMI, ED and Parathryoid disorder and Hypogonadism   Patient has the following Diabetes complications: with diabetic chronic kidney disease, with diabetic polyneuropathy and with diabetic retinopathy; mild non-proliferative; without macular edema  He has attended diabetes education in the past.     Patient's most recent A1c of 6.5% was completed 6 weeks ago.   Patient states since His last A1c His blood glucose levels have been within range throughout the day .   Patient monitors blood glucose 4 times per day and Continuously with personal CGM Dexcom.    Patient blood glucose monitoring device will be uploaded into [employer] [facility] today.        Interpretation of CGM as following: off CGM x 2 wks secondary to sensor issues. Baseline euglycemia with some postprandial hyperglycemia throughout the day secondary to pt missing ozempic dosing at times.   Patient endorses the following diabetes related symptoms: None.   Patient is due today for the following diabetes-related health maintenance standards: Eye Exam.   He denies recent hospital admissions or emergency room visits.  He voices having hypoglycemia in the am when skipping meal.   Patient's concerns today include glycemic control.   Patient medication regimen is as below.     CURRENT DM MEDICATIONS:   Novolog correction dosing every 3 hours   Jardiance 25 mg daily  Ozempic 2 mg weekly    Novolog Correction Dosing every 3 hours as needed OUTSIDE OF EATING  If  - 250, may take 2 units of Novolog  If  - 300, may take 3 units  of Novolog  If  - 350, may take 4 units of Novolog  If  - 400, may take 5 units of Novolog  If +, may take 6 units of Novolog      Patient has failed the following Diabetes medications:   Bydureon  Metformin - GI  Glimepiride      Labs Reviewed.       Lab Results   Component Value Date    CPEPTIDE 4.37 07/20/2017     Lab Results   Component Value Date    GLUTAMICACID 0.01 07/20/2017          //   , There is no height or weight on file to calculate BMI.  His blood sugar in clinic today is:        Review of Systems   Constitutional:  Negative for activity change, appetite change, chills and fever.   HENT:  Negative for dental problem, mouth sores, nosebleeds, sore throat and trouble swallowing.    Eyes:  Negative for pain and discharge.   Respiratory:  Negative for shortness of breath, wheezing and stridor.    Cardiovascular:  Negative for chest pain, palpitations and leg swelling.   Gastrointestinal:  Negative for abdominal pain, diarrhea, nausea and vomiting.   Endocrine: Negative for polydipsia, polyphagia and polyuria.   Genitourinary:  Negative for dysuria, frequency and urgency.   Musculoskeletal:  Negative for joint swelling and myalgias.   Skin:  Negative for rash and wound.   Neurological:  Negative for dizziness, syncope, weakness and headaches.   Psychiatric/Behavioral:  Negative for behavioral problems and dysphoric mood.          Diabetes Management Status  Statin: Taking  ACE/ARB: Taking    Screening or Prevention Patient's value Goal Complete/Controlled?   HgA1C Testing and Control   Lab Results   Component Value Date    HGBA1C 6.5 (H) 06/23/2025      Annually/Less than 8% Yes   Lipid profile : 05/21/2025 Annually Yes   LDL control Lab Results   Component Value Date    LDLCALC 102.4 05/21/2025    Annually/Less than 100 mg/dl  Yes   Nephropathy screening Lab Results   Component Value Date    MICALBCREAT 470.3 (H) 11/08/2024     Lab Results   Component Value Date    PROTEINUA 2+ (A)  05/21/2025    Annually No   Blood pressure BP Readings from Last 1 Encounters:   08/01/25 (!) 150/80    Less than 140/90 Yes   Dilated retinal exam : 10/22/2024 Annually Yes    Foot exam   : 06/19/2025 Annually Yes     Social History     Socioeconomic History    Marital status: Legally     Number of children: 5   Occupational History    Occupation: disabled    Occupation: PT at [employer]    Tobacco Use    Smoking status: Never    Smokeless tobacco: Never   Substance and Sexual Activity    Alcohol use: Yes     Comment: rare, maybe holidays    Drug use: Not Currently     Types: Cocaine    Sexual activity: Not Currently     Partners: Female   Social History Narrative    Utilizing [employer] transportation which has been unreliable.      Social Drivers of Health     Financial Resource Strain: Low Risk  (6/26/2025)    Received from [employer] [facility] Subsidiaries and Affiliates    Overall Financial Resource Strain (CARDIA)     Difficulty of Paying Living Expenses: Not very hard   Food Insecurity: Food Insecurity Present (6/26/2025)    Received from [facility] and [facility]    Hunger Vital Sign     Worried About Running Out of Food in the Last Year: Sometimes true     Ran Out of Food in the Last Year: Sometimes true   Transportation Needs: Unmet Transportation Needs (6/26/2025)    Received from [employer] [facility] Subsidiaries and Affiliates    PRAPARE - Transportation     Lack of Transportation (Medical): Yes     Lack of Transportation (Non-Medical): Yes   Physical Activity: Inactive (6/19/2025)    Exercise Vital Sign     Days of Exercise per Week: 0 days     Minutes of Exercise per Session: 0 min   Stress: Stress Concern Present (6/19/2025)    [employer] - Occupational Stress Questionnaire     Feeling of Stress : Very much   Housing  Stability: Low Risk  (6/26/2025)    Received from [facility]sidiaries and Affiliates    Housing Stability Vital Sign     Unable to Pay for Housing in the Last Year: No     Number of Times Moved in the Last Year: 1     Homeless in the Last Year: No     Past Medical History:   Diagnosis Date    Arthritis     Asthma     Atrial fibrillation     Atrial fibrillation with RVR 08/07/2019    Carotid artery stenosis     CHF (congestive heart failure)     Chronic obstructive pulmonary disease 08/05/2020    Depression     Dermatomyositis     Diabetes mellitus, type 2 Diagnosed in 2000    Elevated coronary artery calcium score 02/14/2024    Elevated PSA     Follow up 07/30/2020    Hypertension     Myocardial infarction     2017    Sleep apnea        Objective:        Physical Exam  Constitutional:       General: He is not in acute distress.     Appearance: He is well-developed. He is not diaphoretic.   HENT:      Head: Normocephalic and atraumatic.      Right Ear: External ear normal.      Left Ear: External ear normal.      Nose: Nose normal.   Eyes:      General:         Right eye: No discharge.         Left eye: No discharge.      Pupils: Pupils are equal, round, and reactive to light.   Cardiovascular:      Rate and Rhythm: Normal rate and regular rhythm.      Heart sounds: Normal heart sounds.   Pulmonary:      Effort: Pulmonary effort is normal.      Breath sounds: Normal breath sounds.   Abdominal:      Palpations: Abdomen is soft.   Musculoskeletal:         General: Normal range of motion.      Cervical back: Normal range of motion and neck supple.   Skin:     General: Skin is warm and dry.      Capillary Refill: Capillary refill takes less than 2 seconds.   Neurological:      Mental Status: He is alert and oriented to person, place, and time. Mental status is at baseline.      Motor: No abnormal muscle tone.      Coordination: Coordination normal.   Psychiatric:          Mood and Affect: Mood normal.         Behavior: Behavior normal.         Thought Content: Thought content normal.         Judgment: Judgment normal.           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 associated with type 2 diabetes mellitus    Diabetic polyneuropathy associated with type 2 diabetes mellitus    Chronic diastolic heart failure    Coronary artery disease of native artery of native heart with stable angina pectoris    Hypertension associated with diabetes    Hyperlipidemia associated with type 2 diabetes mellitus    CKD stage 3 due to type 2 diabetes mellitus    Obesity (BMI 30.0-34.9)        Additional Plan Details:    - POCT Glucose  - Encouraged continuation of lifestyle changes including regular exercise and limiting carbohydrates to 30-45 grams per meal threes times daily and 15 grams per snack with a limit of two daily.   - Encouraged continued monitoring of blood glucose with maintenance of 4 times daily and Continuously with personal CGM Dexcom. Provided tech support number. New sensor placed today.    - Current DM Medication Regimen: Continue Novolog correction dosing every 3 hours as below.  Continue Ozempic 2 mg weekly - set a weekly alarm. Continue Jardiance 25 mg daily.    - A1c in Sept  - Health Maintenance standards addressed today: Eye Exam - will be completed within [facility] system and scheduled today  - Nursing Visit: Patient is below goal range for nursing visit for age group and will not need nursing visit at this time .   - Follow up in 2 months with A1c prior.    CURRENT DM MEDICATIONS:   Novolog correction dosing every 3 hours   Jardiance 25 mg daily  Ozempic 2 mg weekly    Novolog Correction Dosing every 3 hours as needed  OUTSIDE OF EATING  If  - 250, may take 2 units of Novolog  If  - 300, may take 3 units of Novolog  If  - 350, may take 4 units of Novolog  If  - 400, may take 5 units of Novolog  If +, may take 6 units of Novolog    Blakeney McKnight, PA-C [facility]     A total of 30 minutes was spent in face to face time, of which over 50 % was spent in counseling patient on disease process, complications, treatment, and side effects of medications.

## 2025-08-05 ENCOUNTER — TELEPHONE (OUTPATIENT)
Dept: INTERNAL MEDICINE | Facility: CLINIC | Age: 70
End: 2025-08-05
Payer: MEDICARE

## 2025-08-05 NOTE — TELEPHONE ENCOUNTER
Copied from CRM #[id number]. Topic: General Inquiry - Status Check  >> Aug 5, 2025  3:07 PM Summer wrote:  Type:  Call Back Request     Who Called: [facility]   Message for Patient: nurse   What this is regarding?:Diabetic shoes order   Would the patient rather a call back or a response via [facility]? Callback  Best Call Back Number: [phone] fax [phone]  Additional Information:  She state the form was incomplete and she refax it to you to complete on 8/5. Call the pt as well.

## 2025-08-10 ENCOUNTER — DOCUMENT SCAN (OUTPATIENT)
Dept: HOME HEALTH SERVICES | Facility: HOSPITAL | Age: 70
End: 2025-08-10
Payer: MEDICARE

## 2025-08-13 ENCOUNTER — OUTPATIENT CASE MANAGEMENT (OUTPATIENT)
Dept: ADMINISTRATIVE | Facility: OTHER | Age: 70
End: 2025-08-13
Payer: MEDICARE

## 2025-08-21 ENCOUNTER — TELEPHONE (OUTPATIENT)
Dept: INTERNAL MEDICINE | Facility: CLINIC | Age: 70
End: 2025-08-21
Payer: MEDICARE

## 2025-08-26 ENCOUNTER — DOCUMENT SCAN (OUTPATIENT)
Dept: HOME HEALTH SERVICES | Facility: HOSPITAL | Age: 70
End: 2025-08-26
Payer: MEDICARE

## 2025-08-27 ENCOUNTER — OUTPATIENT CASE MANAGEMENT (OUTPATIENT)
Dept: ADMINISTRATIVE | Facility: OTHER | Age: 70
End: 2025-08-27
Payer: MEDICARE

## 2025-08-28 ENCOUNTER — OFFICE VISIT (OUTPATIENT)
Dept: PODIATRY | Facility: CLINIC | Age: 70
End: 2025-08-28
Payer: MEDICARE

## 2025-08-28 ENCOUNTER — TELEPHONE (OUTPATIENT)
Dept: INTERNAL MEDICINE | Facility: CLINIC | Age: 70
End: 2025-08-28
Payer: MEDICARE

## 2025-08-28 VITALS — HEIGHT: 65 IN | WEIGHT: 176.13 LBS | BODY MASS INDEX: 29.34 KG/M2

## 2025-08-28 DIAGNOSIS — E11.9 ENCOUNTER FOR COMPREHENSIVE DIABETIC FOOT EXAMINATION, TYPE 2 DIABETES MELLITUS: Primary | ICD-10-CM

## 2025-08-28 DIAGNOSIS — E11.49 TYPE II DIABETES MELLITUS WITH NEUROLOGICAL MANIFESTATIONS: ICD-10-CM

## 2025-08-28 DIAGNOSIS — B35.1 DERMATOPHYTOSIS OF NAIL: ICD-10-CM

## 2025-08-28 DIAGNOSIS — L84 PRE-ULCERATIVE CALLUSES: ICD-10-CM

## 2025-08-28 DIAGNOSIS — S98.112A AMPUTATED GREAT TOE, LEFT: ICD-10-CM

## 2025-08-28 PROCEDURE — 99999 PR PBB SHADOW E&M-EST. PATIENT-LVL IV: CPT | Mod: PBBFAC,HCNC,, | Performed by: PODIATRIST

## 2025-08-29 ENCOUNTER — TELEPHONE (OUTPATIENT)
Dept: INTERNAL MEDICINE | Facility: CLINIC | Age: 70
End: 2025-08-29
Payer: MEDICARE

## 2025-08-30 ENCOUNTER — EXTERNAL HOME HEALTH (OUTPATIENT)
Dept: HOME HEALTH SERVICES | Facility: HOSPITAL | Age: 70
End: 2025-08-30
Payer: MEDICARE

## 2025-09-03 ENCOUNTER — TELEPHONE (OUTPATIENT)
Dept: PAIN MEDICINE | Facility: CLINIC | Age: 70
End: 2025-09-03
Payer: MEDICARE

## (undated) DEVICE — STOCKINET TUBULAR 1 PLY 6X60IN

## (undated) DEVICE — GEL AQUASONIC 100 STERILE20GM

## (undated) DEVICE — TROCAR ENDOPATH XCEL 5X100MM

## (undated) DEVICE — UNDERGLOVE BIOGEL PI SZ 6.5 LF

## (undated) DEVICE — ELECTRODE REM PLYHSV RETURN 9

## (undated) DEVICE — DRAPE INCISE IOBAN 2 23X17IN

## (undated) DEVICE — MANIFOLD 4 PORT

## (undated) DEVICE — SUT VICRYL PLUS 0 CT1 36IN

## (undated) DEVICE — SUPPORT ULNA NERVE PROTECTOR

## (undated) DEVICE — DRAPE STERI U-SHAPED 47X51IN

## (undated) DEVICE — COVER LIGHT HANDLE 80/CA

## (undated) DEVICE — STOPCOCK 3-WAY

## (undated) DEVICE — DRAPE ABDOMINAL TIBURON 14X11

## (undated) DEVICE — NDL SAFETY 25G X 1.5 ECLIPSE

## (undated) DEVICE — GLOVE BIOGEL PI ORTHO PRO 7.5

## (undated) DEVICE — SEE MEDLINE ITEM 157117

## (undated) DEVICE — STRIP WOUND PK BIGUANIDE 36X.5

## (undated) DEVICE — COVER OVERHEAD SURG LT BLUE

## (undated) DEVICE — SUT #2 TI-CRON HGS-21 30IN

## (undated) DEVICE — APPLICATOR CHLORAPREP ORN 26ML

## (undated) DEVICE — INTRO AGILIS MED CRL 8.5F 71CM

## (undated) DEVICE — APPLIER CLIP ENDO LIGAMAX 5MM

## (undated) DEVICE — SCRUB 10% POVIDONE IODINE 4OZ

## (undated) DEVICE — HANDLE PISTOL GRIP HAND CNTRL

## (undated) DEVICE — LINE PRESSURE MONITORING 96IN

## (undated) DEVICE — BAG TISS RETRV MONARCH 10MM

## (undated) DEVICE — SEE MEDLINE ITEM 157131

## (undated) DEVICE — INTRODUCER HEMOSTASIS 7.5F

## (undated) DEVICE — SUT PROLENE 3-0 PS-2 BL 18IN

## (undated) DEVICE — TOWEL OR DISP STRL BLUE 4/PK

## (undated) DEVICE — SOL 9P NACL IRR PIC IL

## (undated) DEVICE — BANDAGE DERMACEA STRETCH 4X1IN

## (undated) DEVICE — UNDERGLOVES BIOGEL PI SIZE 8

## (undated) DEVICE — GAUZE SPONGE 4X4 12PLY

## (undated) DEVICE — BLADE SURG #15 CARBON STEEL

## (undated) DEVICE — SEE MEDLINE ITEM 157027

## (undated) DEVICE — SPONGE DERMACEA GAUZE 4X4

## (undated) DEVICE — SUT 4-0 ETHILON 18 PS-2

## (undated) DEVICE — CATH TACTICATH ABLAT BIDIR F-J

## (undated) DEVICE — SEE MEDLINE ITEM 157125

## (undated) DEVICE — PAD DEFIB CADENCE ADULT R2

## (undated) DEVICE — SYR 10CC LUER LOCK

## (undated) DEVICE — PAD ABD 8X10 STERILE

## (undated) DEVICE — SEE MEDLINE ITEM 152622

## (undated) DEVICE — SEE MEDLINE ITEM 154981

## (undated) DEVICE — LINER GLOVE POWDERFREE SZ 7

## (undated) DEVICE — CORD LAP 10 DISP

## (undated) DEVICE — TUBING HEATED INSUFFLATOR

## (undated) DEVICE — CONTAINER SPECIMEN STRL 4OZ

## (undated) DEVICE — KIT ANTIFOG

## (undated) DEVICE — SUT MONOCRYL 4.0 PS2 CP496G

## (undated) DEVICE — SEE MEDLINE ITEM 152739

## (undated) DEVICE — TROCAR ENDOPATH XCEL 12X100MM

## (undated) DEVICE — GLOVE SURGICAL LATEX SZ 7

## (undated) DEVICE — POSITIONER HEAD DONUT 9IN FOAM

## (undated) DEVICE — PATCH ENSITE PRECISION NAVX SE

## (undated) DEVICE — SUT ETHILON 3-0 PS2 18 BLK

## (undated) DEVICE — PAD CAST SPECIALIST STRL 4

## (undated) DEVICE — Device

## (undated) DEVICE — SET TUBING COOL POINT IRR

## (undated) DEVICE — ELECTRODE ENDOPATH + II 5X34

## (undated) DEVICE — SUT VICRYL 2-0 27 CT-1

## (undated) DEVICE — KIT PROBE COVER WITH GEL

## (undated) DEVICE — SEE MEDLINE ITEM 146308

## (undated) DEVICE — PACK EP DRAPE

## (undated) DEVICE — STRIP PACKING ANTIMIC 1/4X1

## (undated) DEVICE — CLOSURE SKIN STERI STRIP 1/2X4

## (undated) DEVICE — UNDERGLOVES BIOGEL PI SZ 7 LF

## (undated) DEVICE — SOL NS 1000CC

## (undated) DEVICE — SEE MEDLINE ITEM 107746

## (undated) DEVICE — SCISSOR TIPS METAENBAUM LAP

## (undated) DEVICE — SEE MEDLINE ITEM 146292

## (undated) DEVICE — BOWL FLUID - BACK STOP

## (undated) DEVICE — SHEATH HEMOSTASIS 8.5FR

## (undated) DEVICE — KIT TRIMANO

## (undated) DEVICE — INTRO SWARTZ SL2 8.5FR 63CM

## (undated) DEVICE — SEE MEDLINE ITEM 152522

## (undated) DEVICE — DRESSING N ADH OIL EMUL 3X3

## (undated) DEVICE — BLADE SCALP OPHTL RND TIP

## (undated) DEVICE — COVER DRAPE ACUSON STERILE

## (undated) DEVICE — SUCTION FRAZIER TIP SURG 12FR

## (undated) DEVICE — SHOE POST-OP VEL CLOS M/LG

## (undated) DEVICE — CAUTERY TIP 2 3/4

## (undated) DEVICE — HOOD FLYTE PEELWY STERISHIELD

## (undated) DEVICE — GLOVE BIOGEL ECLIPSE SZ 6.5

## (undated) DEVICE — CATH BIDIRECTIONAL DF CRV 7FR

## (undated) DEVICE — CANNULA ENDOPATH XCEL 5X100MM

## (undated) DEVICE — DECANTER VIAL ASEPTIC TRANSFER

## (undated) DEVICE — SEE MEDLINE ITEM 157216

## (undated) DEVICE — DEVICE CLOSURE DISP 14G

## (undated) DEVICE — TAPE SURGICAL MICROFOAM 4IN

## (undated) DEVICE — DRAPE STERI INSTRUMENT 1018

## (undated) DEVICE — NDL BROCKENBROUGH ADULT

## (undated) DEVICE — BLADE SURG CARBON STEEL SZ11

## (undated) DEVICE — DRESSING WND HELIX ADH 5X6IN

## (undated) DEVICE — SUT X425H ETHIBOND 1-0

## (undated) DEVICE — KIT TRIMANO CHIN

## (undated) DEVICE — SEE MEDLINE ITEM 146298

## (undated) DEVICE — SUT VICRYL 4-0 ANTIBACT

## (undated) DEVICE — DRAPE INCISE IOBAN 2 13X13IN

## (undated) DEVICE — ADHESIVE DERMABOND ADVANCED

## (undated) DEVICE — SOL IRR NACL .9% 3000ML

## (undated) DEVICE — SYR 3CC LUER LOC

## (undated) DEVICE — SUT TI-CRON BLU 2 30 HOS-10

## (undated) DEVICE — DRAPE PLASTIC U 60X72

## (undated) DEVICE — NDL PNEUMO INSUFFLATI 120MM

## (undated) DEVICE — SUT VICRYL PLUS 3-0 PS2 18

## (undated) DEVICE — DRAPE SURG W/TWL 17 5/8X23

## (undated) DEVICE — SUT BLU BR 2 TAPERD NDL 1/2

## (undated) DEVICE — SUT PROLENE 3-0 PS-1 BL 18

## (undated) DEVICE — SYS IRRIG PRESSURIZED SPIKE

## (undated) DEVICE — SEE MEDLINE ITEM 146372

## (undated) DEVICE — SHEET THYROID W/ISO-BAC

## (undated) DEVICE — TOURNIQUET SB QC DP 18X4IN

## (undated) DEVICE — REPROCESSED CATH ACUNAV 8FR

## (undated) DEVICE — BLADE SAG 18.0X1.27X100

## (undated) DEVICE — SPONGE LAP 18X18 PREWASHED

## (undated) DEVICE — ELECTRODE POLYHESIVEPRE-ATTACH